# Patient Record
Sex: MALE | Race: WHITE | NOT HISPANIC OR LATINO | Employment: UNEMPLOYED | URBAN - METROPOLITAN AREA
[De-identification: names, ages, dates, MRNs, and addresses within clinical notes are randomized per-mention and may not be internally consistent; named-entity substitution may affect disease eponyms.]

---

## 2017-08-23 ENCOUNTER — HOSPITAL ENCOUNTER (EMERGENCY)
Facility: MEDICAL CENTER | Age: 42
End: 2017-08-23
Attending: EMERGENCY MEDICINE
Payer: COMMERCIAL

## 2017-08-23 ENCOUNTER — APPOINTMENT (OUTPATIENT)
Dept: RADIOLOGY | Facility: MEDICAL CENTER | Age: 42
End: 2017-08-23
Attending: EMERGENCY MEDICINE
Payer: COMMERCIAL

## 2017-08-23 VITALS
OXYGEN SATURATION: 97 % | HEIGHT: 71 IN | WEIGHT: 315 LBS | SYSTOLIC BLOOD PRESSURE: 125 MMHG | HEART RATE: 62 BPM | RESPIRATION RATE: 17 BRPM | DIASTOLIC BLOOD PRESSURE: 77 MMHG | BODY MASS INDEX: 44.1 KG/M2 | TEMPERATURE: 97.5 F

## 2017-08-23 DIAGNOSIS — R05.9 COUGH: ICD-10-CM

## 2017-08-23 DIAGNOSIS — D84.9 IMMUNOCOMPROMISED (HCC): ICD-10-CM

## 2017-08-23 DIAGNOSIS — R19.7 DIARRHEA OF PRESUMED INFECTIOUS ORIGIN: ICD-10-CM

## 2017-08-23 PROCEDURE — 71010 DX-CHEST-LIMITED (1 VIEW): CPT

## 2017-08-23 PROCEDURE — 99284 EMERGENCY DEPT VISIT MOD MDM: CPT

## 2017-08-23 PROCEDURE — 700101 HCHG RX REV CODE 250: Performed by: EMERGENCY MEDICINE

## 2017-08-23 PROCEDURE — 94640 AIRWAY INHALATION TREATMENT: CPT

## 2017-08-23 RX ORDER — ALBUTEROL SULFATE 90 UG/1
2 AEROSOL, METERED RESPIRATORY (INHALATION) EVERY 6 HOURS PRN
Qty: 1 INHALER | Refills: 0 | Status: SHIPPED | OUTPATIENT
Start: 2017-08-23 | End: 2017-11-13

## 2017-08-23 RX ORDER — ALBUTEROL SULFATE 2.5 MG/3ML
2.5 SOLUTION RESPIRATORY (INHALATION) EVERY 6 HOURS PRN
Qty: 75 ML | Refills: 1 | Status: SHIPPED | OUTPATIENT
Start: 2017-08-23 | End: 2017-11-13

## 2017-08-23 RX ADMIN — IPRATROPIUM BROMIDE 0.5 MG: 0.5 SOLUTION RESPIRATORY (INHALATION) at 09:11

## 2017-08-23 ASSESSMENT — PAIN SCALES - GENERAL: PAINLEVEL_OUTOF10: 0

## 2017-08-23 NOTE — ED PROVIDER NOTES
ED Provider Note    Scribed for Antonio Sagastume M.D. by Navin Mast. 8/23/2017  8:45 AM    Primary care provider: MARTÍN Cm  Means of arrival: Ambulance  History obtained from: Patient  History limited by: None    CHIEF COMPLAINT  Chief Complaint   Patient presents with   • Cold Symptoms     Pt bib ems. Pt reports cold-like symtoms, phelm in throat he cannot cough up, feeling sob with exertion, cough. pt reports recent exposure to sick family members. Pt arrives pink/warm/dry. Given 2 breathing tx per EMS with mild relief. Ambulatory to bathroom upon arrival with steady gait. No RT distress observed.   • Anxiety     Pt becomes very anxious/hyperventilating when he's unable to cough up phelm. Pt encouraged to slow his breathing down. Pt appears very anxious. Speaking in full sentences     HPI  Richard Hubbard is a 42 y.o. male who presents to the Emergency Department after being brought in by ambulance for cold-like symptoms including shortness of breath and diarrhea onset several days ago. The patient reports recent sick exposure to his family, who was experiencing diarrhea and other cold symptoms, and states he initially began experiencing diarrhea 3 days ago with 6 episodes daily. He describes it as watery that is occasionally large in quantity without any blood present. He states he took a several anti-diarrhea medications within the last 24 hours. The patient reports developing shortness of breath as well that exacerbates with exertion. He began developing cough and difficulty producing phlegm within the last 12 hours and has associated anxiety due to the shortness of breath. He received 2 breathing treatments by EMS with minimal relief. He denies any abdominal pain, nausea, vomiting, fever, headache,  rash, sore throat, or rhinorrhea. He states he has not taken any antibiotics within the last few weeks. The patient has past history of immunocompromiseDue to Humira and methotrexate use for  rheumatoid arthritis, but no history of diabetes. He denies history of asthma, does not smoke cigarettes, and has never used an inhaler.     REVIEW OF SYSTEMS  Pertinent positives include: shortness of breath, diarrhea, cold-like symptoms, anxiety, cough, and production of phlegm.  Pertinent negatives include: abdominal pain, nausea, vomiting, fever, headache, fever, rash, sore throat, rhinorrhea, or blood in diarrhea.  E.     PAST MEDICAL HISTORY  Past Medical History   Diagnosis Date   • Bleeding ulcer 5/21/2009   • Obesity, morbid (CMS-HCC) 5/21/2009   • Depression 5/21/2009   • Anxiety 5/21/2009   • Insomnia 5/21/2009   • Back pain 5/21/2009   • Sleep apnea 5/21/2009   • Allergic rhinitis 5/21/2009   • Migraine 5/21/2009   • Eczema 5/21/2009   • Rheumatoid arteritis    • ADD (attention deficit disorder)    • Alcohol abuse    • Bipolar disorder ([facility])        FAMILY HISTORY  Family History   Problem Relation Age of Onset   • Hypertension Mother    • Arthritis Mother    • Depression Mother    • Cancer Father      bladder   • Schizophrenia Father    • Cancer Maternal Grandmother      breast   • Heart Disease Maternal Grandmother    • Psychiatry Other    • Stroke Maternal Aunt        SOCIAL HISTORY  Social History   Substance Use Topics   • Smoking status: Former Smoker -- 0.25 packs/day for 3 years     Types: Cigarettes     Quit date: 01/01/2001   • Smokeless tobacco: Never Used      Comment: occasional cigar   • Alcohol Use: No      Comment: quit 2.5 yrs ago     History   Drug Use No       SURGICAL HISTORY  Past Surgical History   Procedure Laterality Date   • Gastric bypass laparoscopic         CURRENT MEDICATIONS  No current facility-administered medications for this encounter.     Current Outpatient Prescriptions   Medication Sig Dispense Refill   • lamotrigine (LAMICTAL) 150 MG tablet Take 1 Tab by mouth every day. 30 Tab 3   • Adalimumab (HUMIRA PEN SC) Inject  as instructed.     • Methotrexate Sodium  "(METHOTREXATE PO) Take  by mouth.     • oxycodone immediate release (ROXICODONE) 10 MG immediate release tablet Take 1 Tab by mouth 3 times a day as needed for Severe Pain. 90 Tab 0   • tramadol (ULTRAM) 50 MG Tab Take 1 Tab by mouth every 8 hours as needed. 90 Tab 1   • predniSONE (DELTASONE) 20 MG Tab 1 tab PO daily x 5 days 5 Tab 0   • predniSONE (DELTASONE) 10 MG TABS 1/2 tab PO daily 10 Tab 0   • esomeprazole magnesium (NEXIUM) 40 MG PACK Take 40 mg by mouth every morning before breakfast.     • Acetaminophen (TYLENOL PO) Take  by mouth.     • Pantoprazole Sodium (PROTONIX PO) Take  by mouth.         ALLERGIES  Allergies   Allergen Reactions   • Nsaids    • Penicillins    • Phenergan [Promethazine Hcl]        PHYSICAL EXAM  VITAL SIGNS: /54 mmHg  Pulse 71  Temp(Src) 36.4 °C (97.5 °F)  Resp 18  Ht 1.803 m (5' 10.98\")  Wt 147.419 kg (325 lb)  BMI 45.35 kg/m2  Reviewed andNormal  Constitutional: Well developed, Well nourished.  HENT: Normocephalic, atraumatic, bilateral external ears normal, oropharynx slightly dry, No exudates or erythema.   Eyes: PERRLA 3mm, conjunctiva pink, no scleral icterus.   Cardiovascular: Regular rate and rhythm. No murmurs, rubs or gallops.   Respiratory: Lungs clear to auscultation bilaterally. Slightly tachypneic. No wheezes, rales, or rhonchi.   Gastrointestinal:  Abdomen soft, non-tender, non distended.   Skin: Varicose veins.  Genitourinary: No costovertebral angle tenderness.   Neurologic: Alert & oriented x 3, cranial nerves 2-12 intact by passive exam.  No focal deficit noted.  Psychiatric: Affect normal, Judgment normal, Mood normal.     DIFFERENTIAL DIAGNOSIS:  PNA, viral syndrome, bronchospasms, Cdiff diarrhea, bacterial gastroenteritis, immunocompromise.    RADIOLOGY/PROCEDURES  DX-CHEST-LIMITED (1 VIEW)   Final Result      No evidence of acute cardiopulmonary process.        LABORATORY:  Unable to obtain stool sample.     INTERVENTIONS:  Medications "   ipratropium (ATROVENT) 0.02 % nebulizer solution 0.5 mg (0.5 mg Nebulization Given 8/23/17 0911)   Response: temporarily improved, but unable to hear improvement upon reassessment of lungs.    COURSE & MEDICAL DECISION MAKING    8:45 AM - Patient seen and examined at bedside. Patient will be treated with Atrovent nebulizer for his symptoms. Ordered DX-chest, Cdiff, and culture stool to evaluate.     8:51 AM Nursing staff informed me the patient admitted to taking 112 tablets of anti-diarrhea within the last 24 hours.     12:52 PM Reviewed the patient's imaging results, which are shown above.     1:06 PM Patient was reevaluated at bedside. Discussed radiology results with the patient and informed them of the results shown above. Patient was unable to provide stool sample here in the ED, so I recommended that he freeze one upon having a bowel movement at home and submit it for sampling in lab. The patient will be discharged with Albuterol and Atrovent and should return if symptoms worsen or if new symptoms arise. The patient understands and agrees to plan.      This patient with immunocompromise due to Humira and methotrexate and prednisone for rheumatoid arthritis presents with cough and diarrhea. He has an ill contact with similar symptoms so this is likely a viral syndrome. There is no pneumonia. Patient could not provide a sample for stool studies so these will be obtained as an outpatient for C. diff and stool culture given his immunocompromise. There is no evidence of sepsis.    PLAN:  New Prescriptions    ALBUTEROL (PROVENTIL) 2.5MG/3ML NEBU SOLN SOLUTION FOR NEBULIZATION    3 mL by Nebulization route every 6 hours as needed (shortness of breath, cough or wheeze).    IPRATROPIUM (ATROVENT HFA) 17 MCG/ACT AERO SOLN    Inhale 2 Puffs by mouth every 6 hours as needed (cough, shortness of breath, wheeze, sputum production).     Return for shortness of breath, dizziness, abdominal pain, fever, uncontrolled  vomiting, ill appearance  Outpatient stool sample  [facility]    CONDITION: Good.    FINAL IMPRESSION  1. Cough    2. Diarrhea of presumed infectious origin    3. Immunocompromised (CMS-HCC)       INavin (Scribe), am scribing for, and in the presence of, Antonio Sagastume M.D..    Electronically signed by: Navin Mast (Marcelinaibaba), 8/23/2017    IAntonio M.D. personally performed the services described in this documentation, as scribed by Navin Mast in my presence, and it is both accurate and complete.     The note accurately reflects work and decisions made by me.  Antonio Sagastume  8/23/2017  2:06 PM

## 2017-08-23 NOTE — ED NOTES
Chief Complaint   Patient presents with   • Cold Symptoms     Pt bib ems. Pt reports cold-like symtoms, phelm in throat he cannot cough up, feeling sob with exertion, cough. pt reports recent exposure to sick family members. Pt arrives pink/warm/dry. Given 2 breathing tx per EMS with mild relief. Ambulatory to bathroom upon arrival with steady gait. No RT distress observed.   • Anxiety     Pt becomes very anxious/hyperventilating when he's unable to cough up phelm. Pt encouraged to slow his breathing down. Pt appears very anxious. Speaking in full sentences

## 2017-08-23 NOTE — ED NOTES
Pt given spacer for inhalers, given instruct on usage. Pt given prescription instruct as well as discharge instruct to follow up with doctor. Pt given supplies to collect stool sample and how to follow up with pcp lab. Pt alert/orineted x4, steady gait. Ambulatory to bathroom prior to discharge. Denies questions needs at this time. No RT distress noted.

## 2017-08-23 NOTE — ED AVS SNAPSHOT
[email or web address] Access Code: Activation code not generated  Current [email or web address] Status: Active    [employer]  A secure, online tool to manage your health information     [employer]’s [email or web address]® is a secure, online tool that connects you to your personalized health information from the privacy of your home -- day or night - making it very easy for you to manage your healthcare. Once the activation process is completed, you can even access your medical information using the [email or web address] cj, which is available for free in the Apple Cj store or Google Play store.     [email or web address] provides the following levels of access (as shown below):   My Chart Features   [facility] Primary Care Doctor [facility]  Specialists [facility]  Urgent  Care Non-[facility]  Primary Care  Doctor   Email your healthcare team securely and privately 24/7 X X X X   Manage appointments: schedule your next appointment; view details of past/upcoming appointments X      Request prescription refills. X      View recent personal medical records, including lab and immunizations X X X X   View health record, including health history, allergies, medications X X X X   Read reports about your outpatient visits, procedures, consult and ER notes X X X X   See your discharge summary, which is a recap of your hospital and/or ER visit that includes your diagnosis, lab results, and care plan. X X       How to register for [email or web address]:  1. Go to  [email or web address].  2. Click on the Sign Up Now box, which takes you to the New Member Sign Up page. You will need to provide the following information:  a. Enter your [email or web address] Access Code exactly as it appears at the top of this page. (You will not need to use this code after you’ve completed the sign-up process. If you do not sign up before the expiration date, you must request a new code.)   b. Enter your date of birth.   c. Enter your home email address.   d. Click Submit, and follow the next screen’s instructions.  3. Create a [email or web address] ID. This will  be your Phone2Action login ID and cannot be changed, so think of one that is secure and easy to remember.  4. Create a [employer]2Action password. You can change your password at any time.  5. Enter your Password Reset Question and Answer. This can be used at a later time if you forget your password.   6. Enter your e-mail address. This allows you to receive e-mail notifications when new information is available in Phone2Action.  7. Click Sign Up. You can now view your health information.    For assistance activating your Phone2Action account, call [phone]

## 2017-08-23 NOTE — FLOWSHEET NOTE
08/23/17 0911   Interdisciplinary Plan of Care-Goals (Indications)   Obstructive Ventilatory Defect or Pulmonary Disease without Obvious Obstruction Strong Subjective / Objective Improvement   Interdisciplinary Plan of Care-Outcomes    Bronchodilator Outcome Patient at Stable Baseline   Education   Education Yes - Pt. / Family has been Instructed in use of Respiratory Equipment;Yes - Pt. / Family has been Instructed in use of Respiratory Medications and Adverse Reactions   RT Assessment of Delivered Medications   Evaluation of Medication Delivery Daily Yes-- Pt /Family has been Instructed in use of Respiratory Medications and Adverse Reactions   SVN Group   #SVN Performed Yes   Given By: Mouthpiece   Date SVN Last Changed 08/23/17   Date SVN Next Change Due (Q 7 Days) 08/30/17   Respiratory WDL   Respiratory (WDL) X   Chest Exam   Work Of Breathing / Effort Mild   Respiration 20   Pulse 74   Heart Rate (Monitored) 74   Breath Sounds   Pre/Post Intervention Pre Intervention Assessment   RML Breath Sounds Diminished   RLL Breath Sounds Diminished   LLL Breath Sounds Diminished   Secretions   Cough Non Productive   Oximetry   Continuous Oximetry Yes   O2 Alarms Set & Reviewed Yes   Oxygen   Pulse Oximetry 95 %   O2 (LPM) 0   O2 (FiO2) 21   O2 Daily Delivery Respiratory  Room Air with O2 Available

## 2017-08-23 NOTE — DISCHARGE INSTRUCTIONS
Take albuterol and Atrovent for cough and congestion. Minimize antidiarrheal use. Submit a stool sample to the lab. Follow-up at [facility] urgent care for stool study results in 2 days. Return for bloody diarrhea, abdominal pain, high fever, shortness of breath, dizziness or ill appearance.

## 2017-08-23 NOTE — ED NOTES
Chief Complaint   Patient presents with   • Cold Symptoms     Pt bib ems. Pt reports cold-like symtoms, phelm in throat he cannot cough up, feeling sob with exertion, cough. pt reports recent exposure to sick family members. Pt arrives pink/warm/dry. Given 2 breathing tx per EMS with mild relief. Ambulatory to bathroom upon arrival with steady gait. No RT distress observed.

## 2017-08-23 NOTE — ED AVS SNAPSHOT
8/23/2017    Richard Hubbard  [address] [place] [address]    Dear :    [facility] wants to ensure your discharge home is safe and you or your loved ones have had all of your questions answered regarding your care after you leave the hospital.    Below is a list of resources and contact information should you have any questions regarding your hospital stay, follow-up instructions, or active medical symptoms.    Questions or Concerns Regarding… Contact   Medical Questions Related to Your Discharge  (7 days a week, 8am-5pm) Contact a Nurse Care Coordinator   [phone]   Medical Questions Not Related to Your Discharge  (24 hours a day / 7 days a week)  Contact the Nurse Health Line   [phone]    Medications or Discharge Instructions Refer to your discharge packet   or contact your [facility] Primary Care Provider   [phone]   Follow-up Appointment(s) Schedule your appointment via [employer]   or contact Scheduling [phone]   Billing Review your statement via [employer]  or contact Billing [phone]   Medical Records Review your records via [employer]   or contact Medical Records [phone]     You may receive a telephone call within two days of discharge. This call is to make certain you understand your discharge instructions and have the opportunity to have any questions answered. You can also easily access your medical information, test results and upcoming appointments via the [employer] free online health management tool. You can learn more and sign up at [email or web address]. For assistance setting up your [employer] account, please call [phone].    Once again, we want to ensure your discharge home is safe and that you have a clear understanding of any next steps in your care. If you have any questions or concerns, please do not hesitate to contact us, we are here for you. Thank you for choosing [facility] for your healthcare needs.    Sincerely,    Your [facility] Healthcare Team

## 2017-08-23 NOTE — ED AVS SNAPSHOT
Home Care Instructions                                                                                                                Richard Hubbard   MRN: [id number]    Department:  [facility], Emergency Dept   Date of Visit:  8/23/2017            [facility], Emergency Dept    [address]    [place] [address]    Phone:  [phone]      You were seen by     Antonio Sagastume M.D.      Your Diagnosis Was     Cough     R05       These are the medications you received during your hospitalization from 08/23/2017 0748 to 08/23/2017 1325     Date/Time Order Dose Route Action    08/23/2017 0911 ipratropium (ATROVENT) 0.02 % nebulizer solution 0.5 mg 0.5 mg Nebulization Given      Medication Information     Review all of your home medications and newly ordered medications with your primary doctor and/or pharmacist as soon as possible. Follow medication instructions as directed by your doctor and/or pharmacist.     Please keep your complete medication list with you and share with your physician. Update the information when medications are discontinued, doses are changed, or new medications (including over-the-counter products) are added; and carry medication information at all times in the event of emergency situations.               Medication List      START taking these medications        Instructions    Morning Afternoon Evening Bedtime    * albuterol 2.5mg/3ml Nebu solution for nebulization   Commonly known as:  PROVENTIL        3 mL by Nebulization route every 6 hours as needed (shortness of breath, cough or wheeze).   Dose:  2.5 mg                        * albuterol 108 (90 Base) MCG/ACT Aers inhalation aerosol        Inhale 2 Puffs by mouth every 6 hours as needed for Shortness of Breath. You may use it up to every 4 hours but should see a physician if you need it more often than every 4 hours.   Dose:  2 Puff                        * ipratropium 17 MCG/ACT Aers   Commonly  known as:  ATROVENT HFA        Inhale 2 Puffs by mouth every 6 hours as needed (cough, shortness of breath, wheeze, sputum production).   Dose:  2 Puff                        * ipratropium 17 MCG/ACT Aers   Commonly known as:  ATROVENT HFA        Inhale 2 Puffs by mouth every 6 hours as needed (cough, shortness of breath, wheeze, sputum production).   Dose:  2 Puff                        * Notice:  This list has 4 medication(s) that are the same as other medications prescribed for you. Read the directions carefully, and ask your doctor or other care provider to review them with you.      ASK your doctor about these medications        Instructions    Morning Afternoon Evening Bedtime    HUMIRA PEN SC        Inject  as instructed.                        lamotrigine 150 MG tablet   Commonly known as:  LAMICTAL        Take 1 Tab by mouth every day.   Dose:  150 mg                        METHOTREXATE PO        Take  by mouth.                        NEXIUM 40 MG Pack   Generic drug:  esomeprazole magnesium        Take 40 mg by mouth every morning before breakfast.   Dose:  40 mg                        oxycodone immediate release 10 MG immediate release tablet   Commonly known as:  ROXICODONE        Doctor's comments:  Fill after 11/12/15   Take 1 Tab by mouth 3 times a day as needed for Severe Pain.   Dose:  10 mg                        * predniSONE 10 MG Tabs   Commonly known as:  DELTASONE        1/2 tab PO daily                        * predniSONE 20 MG Tabs   Commonly known as:  DELTASONE        1 tab PO daily x 5 days                        PROTONIX PO        Take  by mouth.                        tramadol 50 MG Tabs   Commonly known as:  ULTRAM        Take 1 Tab by mouth every 8 hours as needed.   Dose:  50 mg                        TYLENOL PO        Take  by mouth.                        * Notice:  This list has 2 medication(s) that are the same as other medications prescribed for you. Read the directions  carefully, and ask your doctor or other care provider to review them with you.         Where to Get Your Medications      These medications were sent to [id number] - [place], [place] - [address] [facility] [address]  [address], [place] [address]     Phone:  [phone]    - albuterol 2.5mg/3ml Nebu solution for nebulization  - ipratropium 17 MCG/ACT Aers      You can get these medications from any pharmacy     Bring a paper prescription for each of these medications    - albuterol 108 (90 Base) MCG/ACT Aers inhalation aerosol  - ipratropium 17 MCG/ACT Aers            Procedures and tests performed during your visit     DX-CHEST-LIMITED (1 VIEW)    SMALL VOLUME NEBULIZER        Discharge Instructions       Take albuterol and Atrovent for cough and congestion. Minimize antidiarrheal use. Submit a stool sample to the lab. Follow-up at [facility] urgent care for stool study results in 2 days. Return for bloody diarrhea, abdominal pain, high fever, shortness of breath, dizziness or ill appearance.          Patient Information     Patient Information    Following emergency treatment: all patient requiring follow-up care must return either to a private physician or a clinic if your condition worsens before you are able to obtain further medical attention, please return to the emergency room.     Billing Information    At [facility], we work to make the billing process streamlined for our patients.  Our Representatives are here to answer any questions you may have regarding your hospital bill.  If you have insurance coverage and have supplied your insurance information to us, we will submit a claim to your insurer on your behalf.  Should you have any questions regarding your bill, we can be reached online or by phone as follows:  Online: You are able pay your bills online or live chat with our representatives about any billing questions you may have. We are here to help Monday  - Friday from 8:00am to 7:30pm and 9:00am - 12:00pm on Saturdays.  Please visit [email or web address]  for more information.   Phone:  [phone] or [phone]    Please note that your emergency physician, surgeon, pathologist, radiologist, anesthesiologist, and other specialists are not employed by [facility] and will therefore bill separately for their services.  Please contact them directly for any questions concerning their bills at the numbers below:     Emergency Physician Services:  [phone]  [facility]:  [phone]  [facility]:  [phone]  [facility]:  [phone]    1. Your final bill may vary from the amount quoted upon discharge if all procedures are not complete at that time, or if your doctor has additional procedures of which we are not aware. You will receive an additional bill if you return to the Emergency Department at [facility] for suture removal regardless of the facility of which the sutures were placed.     2. Please arrange for settlement of this account at the emergency registration.    3. All self-pay accounts are due in full at the time of treatment.  If you are unable to meet this obligation then payment is expected within 4-5 days.     4. If you have had radiology studies (CT, X-ray, Ultrasound, MRI), you have received a preliminary result during your emergency department visit. Please contact the radiology department [phone] to receive a copy of your final result. Please discuss the Final result with your primary physician or with the follow up physician provided.     Crisis Hotline:  [place] Crisis Hotline:  5-167-LIAAMEP or 1-613.909.1681  [place] Crisis Hotline:    1-690.995.8656 or 518-146-4909         ED Discharge Follow Up Questions    1. In order to provide you with very good care, we would like to follow up with a phone call in the next few days.  May we have your permission  to contact you?     YES /  NO    2. What is the best phone number to call you? (       )_____-__________    3. What is the best time to call you?      Morning  /  Afternoon  /  Evening                   Patient Signature:  ____________________________________________________________    Date:  ____________________________________________________________

## 2017-08-23 NOTE — ED NOTES
PT informed this RN that he took approx 112 anti-diarrhea pills in the last 24 hours. Pt reports dry mouth and that his diarrhea has resolved.

## 2017-11-13 ENCOUNTER — OFFICE VISIT (OUTPATIENT)
Dept: MEDICAL GROUP | Facility: MEDICAL CENTER | Age: 42
End: 2017-11-13
Payer: COMMERCIAL

## 2017-11-13 VITALS
SYSTOLIC BLOOD PRESSURE: 140 MMHG | BODY MASS INDEX: 44.1 KG/M2 | HEIGHT: 71 IN | HEART RATE: 68 BPM | DIASTOLIC BLOOD PRESSURE: 90 MMHG | OXYGEN SATURATION: 97 % | WEIGHT: 315 LBS | TEMPERATURE: 97.9 F

## 2017-11-13 DIAGNOSIS — R35.89 POLYURIA: ICD-10-CM

## 2017-11-13 DIAGNOSIS — E66.01 MORBID OBESITY WITH BMI OF 40.0-44.9, ADULT (HCC): ICD-10-CM

## 2017-11-13 DIAGNOSIS — Z23 NEED FOR VACCINATION: ICD-10-CM

## 2017-11-13 DIAGNOSIS — R60.0 BILATERAL LEG EDEMA: ICD-10-CM

## 2017-11-13 DIAGNOSIS — M06.9 RHEUMATOID ARTHRITIS INVOLVING MULTIPLE SITES, UNSPECIFIED RHEUMATOID FACTOR PRESENCE: ICD-10-CM

## 2017-11-13 DIAGNOSIS — F50.89 PICA IN ADULTS: ICD-10-CM

## 2017-11-13 DIAGNOSIS — G47.33 OSA (OBSTRUCTIVE SLEEP APNEA): ICD-10-CM

## 2017-11-13 DIAGNOSIS — F32.A DEPRESSION, UNSPECIFIED DEPRESSION TYPE: ICD-10-CM

## 2017-11-13 PROCEDURE — 90686 IIV4 VACC NO PRSV 0.5 ML IM: CPT | Performed by: NURSE PRACTITIONER

## 2017-11-13 PROCEDURE — 90471 IMMUNIZATION ADMIN: CPT | Performed by: NURSE PRACTITIONER

## 2017-11-13 PROCEDURE — 99214 OFFICE O/P EST MOD 30 MIN: CPT | Mod: 25 | Performed by: NURSE PRACTITIONER

## 2017-11-13 ASSESSMENT — PAIN SCALES - GENERAL: PAINLEVEL: 4=SLIGHT-MODERATE PAIN

## 2017-11-13 ASSESSMENT — PATIENT HEALTH QUESTIONNAIRE - PHQ9: CLINICAL INTERPRETATION OF PHQ2 SCORE: 0

## 2017-11-14 NOTE — PROGRESS NOTES
CC: swelling in legs      Richard Hubbard is a 42 y.o. male here to establish care and to discuss the evaluation and management of:    1. Rheumatoid arthritis involving multiple sites, unspecified rheumatoid factor presence (CMS-HCC)  Patient suffers from rheumatoid arthritis in his knees, ankles and his hands. He sees a rheumatologist and takes Humira and occasional prednisone. Needs a another referral to rheumatology    2. MONICA (obstructive sleep apnea)  Patient states that he was seen by pulmonology and he was told that he has sleep apnea and uses CPAP machine however states that is not correctly calibrated and he needs a referral back to pulmonology.    3. Depression, unspecified depression type  Patient does suffer from depression does take his Lamictal daily. Denies any suicidal thoughts or ideations.    4. Polyuria  Patient states that he has noticed an increase in his urination, and thinks it may be related to the swelling in his legs. States his wife is concerned for diabetes however denies any polyphagia and/or polydipsia.    5. Bilateral leg edema  Patient states for approximately one month he has had some swelling in his legs, pain around his ankles, no pain in his calves. No cuts or wounds, no shortness of breath, chest pain. Does not wear compression stockings states is on his feet as he takes care of his 2-year-old daughter. Denies any numbness or tingling in his feet, states that after resting throughout the night his flank and a little bit decreased but not significantly.    6. Pica in adults  States that he could have approximate 32 ounces of ice per day. States his wife is concerned for anemia as he has had it before. Denies any acute blood loss.    7. Morbid obesity with BMI of 40.0-44.9, adult (CMS-HCC)  He states that he did have a history in 1999 of a Lucila-en-Y procedure. Does not routinely exercise.    8. Need for vaccination  Requesting flu vaccine      ROS:  Denies any Headache, Blurred  Vision, Confusion Chest pain,  Shortness of breath,  Abdominal pain, Changes of bowel or bladder, Fevers, Nights sweats, Weight Changes, Focal weakness or numbness.  All other systems are negative.      Current Outpatient Prescriptions:   •  lamotrigine (LAMICTAL) 150 MG tablet, Take 1 Tab by mouth every day., Disp: 30 Tab, Rfl: 3  •  Adalimumab (HUMIRA PEN SC), Inject  as instructed., Disp: , Rfl:   •  Methotrexate Sodium (METHOTREXATE PO), Take  by mouth., Disp: , Rfl:   •  esomeprazole magnesium (NEXIUM) 40 MG PACK, Take 40 mg by mouth every morning before breakfast., Disp: , Rfl:   •  Acetaminophen (TYLENOL PO), Take  by mouth., Disp: , Rfl:   •  predniSONE (DELTASONE) 20 MG Tab, 1 tab PO daily x 5 days, Disp: 5 Tab, Rfl: 0    Allergies   Allergen Reactions   • Nsaids    • Penicillins    • Phenergan [Promethazine Hcl]        Past Medical History:   Diagnosis Date   • ADD (attention deficit disorder)    • Alcohol abuse    • Allergic rhinitis 5/21/2009   • Anxiety 5/21/2009   • Back pain 5/21/2009   • Bipolar disorder (CMS-HCC)    • Bleeding ulcer 5/21/2009   • Depression 5/21/2009   • Eczema 5/21/2009   • History of bleeding ulcers 2/12/2015   • Insomnia 5/21/2009   • Migraine 5/21/2009   • Obesity, morbid (CMS-HCC) 5/21/2009   • Rheumatoid arteritis    • Sleep apnea 5/21/2009     Past Surgical History:   Procedure Laterality Date   • GASTRIC BYPASS LAPAROSCOPIC       Family History   Problem Relation Age of Onset   • Hypertension Mother    • Arthritis Mother    • Depression Mother    • Cancer Father      bladder   • Schizophrenia Father    • Cancer Maternal Grandmother      breast   • Heart Disease Maternal Grandmother    • Psychiatry Other    • Stroke Maternal Aunt      Social History     Social History   • Marital status:      Spouse name: N/A   • Number of children: N/A   • Years of education: N/A     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      Social History Main Topics   • Smoking status: Former  "Smoker     Packs/day: 0.25     Years: 3.00     Types: Cigarettes     Quit date: 1/1/2001   • Smokeless tobacco: Never Used      Comment: occasional cigar   • Alcohol use No      Comment: quit 2.5 yrs ago   • Drug use: No   • Sexual activity: Yes     Partners: Female      Comment: ;      Other Topics Concern   • Not on file     Social History Narrative    , lives in [place]       Objective:     Vitals: /90   Pulse 68   Temp 36.6 °C (97.9 °F)   Ht 1.803 m (5' 11\")   Wt (!) 145.9 kg (321 lb 10.4 oz)   SpO2 97%   BMI 44.86 kg/m²      General: Alert, pleasant, NAD  HEENT:  Normocephalic. No icterus or pallor.  Conjunctivae and lids normal.  External ears normal.   Neck supple.  No thyromegaly or masses palpated. No cervical or supraclavicular lymphadenopathy.  Heart:  Regular rate and rhythm.  S1 and S2 normal.  +murmur 2/6  Respiratory:  Normal respiratory effort.  Clear to auscultation bilaterally.    Skin:  Warm, dry, no rashes  Musculoskeletal:  Gait is normal.  Moves all extremities well.  Extremities:  Pedal pulses 1+ symmetric. Bilateral 3+ leg edema with approximately 2+ pitting edema, no ulcers or discoloration in LE.  Neurological: No tremors, sensation grossly intact,gait is normal  Psych:  Affect/mood is normal, judgement is good, memory is intact, grooming is appropriate.      Assessment and Plan.   42 y.o. male here to establish and discuss the following    1. Rheumatoid arthritis involving multiple sites, unspecified rheumatoid factor presence (CMS-HCC)  Chronic. Takes Humira and prednisone as needed for flareups. Managed by rheumatology.  - REFERRAL TO RHEUMATOLOGY    2. MONICA (obstructive sleep apnea  Chronic. CPAP machine not calibrated. Last pulmonary sleep Center visit was in 2016.  - REFERRAL TO PULMONOLOGY    3. Depression, unspecified depression type  Chronic. Continue to take Lamictal. Denies any suicidal ideations at this time.  - REFERRAL TO BEHAVIORAL HEALTH  - TSH WITH " REFLEX TO FT4; Future    4. Polyuria  New.Possibly related to underlying diagnosis of diabetes, or increased fluid retention. We will see if fasting glucose is elevated and move forward ruling out diabetes.  - COMP METABOLIC PANEL; Future    5. Bilateral leg edema  Stable.Discussed elevating legs, wearing compression stockings, decreasing salt intake. If not improved will recommend cardiac workup.    6. Pica in adults  Not controlled. Patient states that he has roughly has 32 ounces of ice daily. Has a history of anemia will check a CBC.  - CBC WITHOUT DIFFERENTIAL; Future    7. Morbid obesity with BMI of 40.0-44.9, adult (CMS-HCC)  Chronic. Encourage heart healthy diet and exercise.    8. Need for vaccination    - INFLUENZA VACCINE QUAD INJ >3Y(PF)      Return in about 2 weeks (around 11/27/2017).    I have placed the above orders and discussed them with an approved delegating provider.  The MA is performing the below orders under the direction of Dr. Morales RESENDIZ

## 2017-12-29 ENCOUNTER — TELEPHONE (OUTPATIENT)
Dept: MEDICAL GROUP | Facility: MEDICAL CENTER | Age: 42
End: 2017-12-29

## 2017-12-29 NOTE — TELEPHONE ENCOUNTER
1. Caller Name: Dr. Man Farah                                         Call Back Number: [phone]      Patient approves a detailed voicemail message: no    Received request for stat authorization for pt to be seen by Dr. Conway, rheumatology, on Tuesday 01/02

## 2018-02-02 ENCOUNTER — OFFICE VISIT (OUTPATIENT)
Dept: URGENT CARE | Facility: PHYSICIAN GROUP | Age: 43
End: 2018-02-02
Payer: COMMERCIAL

## 2018-02-02 VITALS
RESPIRATION RATE: 24 BRPM | WEIGHT: 299 LBS | HEART RATE: 114 BPM | HEIGHT: 71 IN | OXYGEN SATURATION: 93 % | DIASTOLIC BLOOD PRESSURE: 90 MMHG | BODY MASS INDEX: 41.86 KG/M2 | TEMPERATURE: 100.1 F | SYSTOLIC BLOOD PRESSURE: 140 MMHG

## 2018-02-02 DIAGNOSIS — J01.90 ACUTE BACTERIAL SINUSITIS: ICD-10-CM

## 2018-02-02 DIAGNOSIS — H66.91 ACUTE RIGHT OTITIS MEDIA: ICD-10-CM

## 2018-02-02 DIAGNOSIS — R50.9 FEVER, UNSPECIFIED FEVER CAUSE: ICD-10-CM

## 2018-02-02 DIAGNOSIS — B96.89 ACUTE BACTERIAL SINUSITIS: ICD-10-CM

## 2018-02-02 DIAGNOSIS — R06.02 SHORTNESS OF BREATH: ICD-10-CM

## 2018-02-02 DIAGNOSIS — H10.33 ACUTE BACTERIAL CONJUNCTIVITIS OF BOTH EYES: ICD-10-CM

## 2018-02-02 LAB
FLUAV+FLUBV AG SPEC QL IA: NORMAL
INT CON NEG: NORMAL
INT CON POS: NORMAL

## 2018-02-02 PROCEDURE — 87804 INFLUENZA ASSAY W/OPTIC: CPT | Performed by: FAMILY MEDICINE

## 2018-02-02 PROCEDURE — 99214 OFFICE O/P EST MOD 30 MIN: CPT | Mod: 25 | Performed by: FAMILY MEDICINE

## 2018-02-02 RX ORDER — BENZONATATE 200 MG/1
CAPSULE ORAL
Qty: 30 CAP | Refills: 0 | Status: SHIPPED | OUTPATIENT
Start: 2018-02-02 | End: 2018-08-09

## 2018-02-02 RX ORDER — LEVOFLOXACIN 500 MG/1
TABLET, FILM COATED ORAL
Qty: 10 TAB | Refills: 0 | Status: SHIPPED | OUTPATIENT
Start: 2018-02-02 | End: 2018-08-09

## 2018-02-02 RX ORDER — CEFTRIAXONE 1 G/1
1 INJECTION, POWDER, FOR SOLUTION INTRAMUSCULAR; INTRAVENOUS ONCE
Status: COMPLETED | OUTPATIENT
Start: 2018-02-02 | End: 2018-02-02

## 2018-02-02 RX ORDER — ALBUTEROL SULFATE 90 UG/1
AEROSOL, METERED RESPIRATORY (INHALATION)
Qty: 1 INHALER | Refills: 3 | Status: SHIPPED | OUTPATIENT
Start: 2018-02-02 | End: 2019-08-15

## 2018-02-02 RX ORDER — OFLOXACIN 3 MG/ML
SOLUTION/ DROPS OPHTHALMIC
Qty: 5 ML | Refills: 1 | Status: SHIPPED | OUTPATIENT
Start: 2018-02-02 | End: 2019-08-15

## 2018-02-02 RX ADMIN — CEFTRIAXONE 1 G: 1 INJECTION, POWDER, FOR SOLUTION INTRAMUSCULAR; INTRAVENOUS at 13:46

## 2018-02-02 NOTE — PROGRESS NOTES
Chief Complaint:    Chief Complaint   Patient presents with   • Cough     productive cough, runny nose, SOB, fever       History of Present Illness:    This is a new problem. He has been sick x 4 days. Has had fever over 100 F, purulent discharge from both eyes, right ear discomfort, nasal symptoms with purulent mucus from nose, sore throat, cough productive of purulent mucus, and occl shortness of breath. Needs refill of Albuterol MDI. Had non-life threatening reaction to PCN as child but as had PCN since without problems.      Review of Systems:    Constitutional: See HPI.  Eyes: See HPI.  ENT: See HPI.   Respiratory: See HPI.  Cardiovascular: Negative for chest pain, palpitations, orthopnea, claudication, leg swelling, and PND.   Gastrointestinal: Negative for abdominal pain, nausea, vomiting, diarrhea, constipation, blood in stool, and melena.   Genitourinary: Negative for dysuria, urinary urgency, urinary frequency, hematuria, and flank pain.   Musculoskeletal: No new symptoms.  Skin: Negative for rash and itching.   Neurological: Negative for dizziness, tingling, tremors, sensory change, speech change, focal weakness, seizures, loss of consciousness, and headaches.   Endo: Negative for polydipsia.   Heme: Does not bruise/bleed easily.   Psychiatric/Behavioral: No new symptoms.    Past Medical History:    Past Medical History:   Diagnosis Date   • ADD (attention deficit disorder)    • Alcohol abuse    • Allergic rhinitis 5/21/2009   • Anxiety 5/21/2009   • Back pain 5/21/2009   • Bipolar disorder (CMS-HCC)    • Bleeding ulcer 5/21/2009   • Depression 5/21/2009   • Eczema 5/21/2009   • History of bleeding ulcers 2/12/2015   • Insomnia 5/21/2009   • Migraine 5/21/2009   • Obesity, morbid (CMS-HCC) 5/21/2009   • Rheumatoid arteritis    • Sleep apnea 5/21/2009       Past Surgical History:    Past Surgical History:   Procedure Laterality Date   • GASTRIC BYPASS LAPAROSCOPIC         Social History:    Social History  "    Social History   • Marital status:      Spouse name: N/A   • Number of children: N/A   • Years of education: N/A     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      Social History Main Topics   • Smoking status: Former Smoker     Packs/day: 0.25     Years: 3.00     Types: Cigarettes     Quit date: 1/1/2001   • Smokeless tobacco: Never Used      Comment: occasional cigar   • Alcohol use No      Comment: quit 2.5 yrs ago   • Drug use: No   • Sexual activity: Yes     Partners: Female      Comment: ;      Other Topics Concern   • Not on file     Social History Narrative    , lives in [place]       Family History:    Family History   Problem Relation Age of Onset   • Hypertension Mother    • Arthritis Mother    • Depression Mother    • Cancer Father      bladder   • Schizophrenia Father    • Cancer Maternal Grandmother      breast   • Heart Disease Maternal Grandmother    • Psychiatry Other    • Stroke Maternal Aunt        Medications:    Current Outpatient Prescriptions on File Prior to Visit   Medication Sig Dispense Refill   • lamotrigine (LAMICTAL) 150 MG tablet Take 1 Tab by mouth every day. 30 Tab 3   • Adalimumab (HUMIRA PEN SC) Inject  as instructed.     • Methotrexate Sodium (METHOTREXATE PO) Take  by mouth.     • esomeprazole magnesium (NEXIUM) 40 MG PACK Take 40 mg by mouth every morning before breakfast.     • Acetaminophen (TYLENOL PO) Take  by mouth.       No current facility-administered medications on file prior to visit.        Allergies:    Allergies   Allergen Reactions   • Nsaids    • Penicillins    • Phenergan [Promethazine Hcl]        Vitals:    Vitals:    02/02/18 1204   BP: 140/90   Pulse: (!) 114   Resp: (!) 24   Temp: 37.8 °C (100.1 °F)   SpO2: 93%   Weight: (!) 135.6 kg (299 lb)   Height: 1.803 m (5' 11\")       Physical Exam:    Constitutional: Vital signs reviewed. Appears well-developed and well-nourished. Occl cough. Fatigued.  Eyes: Purulent discharge at bilateral " medial canthi. Conjunctivae mildly injected bilaterally. PERRLA.  ENT: Right TM is markedly erythematous. External ears normal. External auditory canals normal without discharge. Left TM translucent and non-bulging. Hearing normal. Nasal mucosa erythematous. Lips/teeth are normal. Oral mucosa pink and moist. Posterior pharynx: mild-moderately erythematous. No exudate.  Neck: Neck supple.   Cardiovascular: Regular rate and rhythm with 2/6 SE murmur. He is aware of murmur, has been evaluated, and has been told is benign.  Pulmonary/Chest: Respirations non-labored. Clear to auscultation bilaterally.  Lymph: Cervical nodes without tenderness or enlargement.  Musculoskeletal: Normal gait. Normal range of motion. No muscular atrophy or weakness.  Neurological: Alert and oriented to person, place, and time. Muscle tone normal. Coordination normal.   Skin: No rashes or lesions. Warm, dry, normal turgor.  Psychiatric: Normal mood and affect. Behavior is normal. Judgment and thought content normal.     Diagnostics:    POCT INFLUENZA A/B (Order #[id number]) on 2/2/18   Component Results     Component   Rapid Influenza A-B   NEG    Internal Control Positive   Valid    Internal Control Negative   Valid    Last Resulted Time   Fri Feb 2, 2018  1:45 PM       Assessment / Plan:    1. Fever, unspecified fever cause  - POCT Influenza A/B    2. Acute right otitis media  - cefTRIAXone (ROCEPHIN) injection 1 g; 1,000 mg by Intramuscular route Once.  - levoFLOXacin (LEVAQUIN) 500 MG tablet; 1 TAB ONCE A DAY X 10 DAYS.  Dispense: 10 Tab; Refill: 0    3. Acute bacterial conjunctivitis of both eyes  - ofloxacin (OCUFLOX) 0.3 % Solution; 1-2 DROPS TO BOTH EYES EVERY 2-4 HOURS WHILE AWAKE. USE FOR ONE EXTRA DAY AFTER BETTER.  Dispense: 5 mL; Refill: 1    4. Acute bacterial sinusitis  - benzonatate (TESSALON) 200 MG capsule; 1 CAP UP TO 3 TIMES A DAY ONLY IF NEEDED FOR COUGH.  Dispense: 30 Cap; Refill: 0    5. Shortness of breath  - albuterol  108 (90 Base) MCG/ACT Aero Soln inhalation aerosol; 2 PUFFS EVERY 4 HOURS ONLY IF NEEDED FOR COUGH, WHEEZING, OR SHORTNESS OF BREATH.  Dispense: 1 Inhaler; Refill: 3      Discussed with him DDX and management options.    Due to severity of symptoms and exam findings, offered him aggressive antibiotic treatment. He would like.    Agreeable to medications given and prescribed.     Follow-up with PCP or urgent care if getting worse or not better with above.

## 2018-07-11 ENCOUNTER — OFFICE VISIT (OUTPATIENT)
Dept: URGENT CARE | Facility: PHYSICIAN GROUP | Age: 43
End: 2018-07-11
Payer: COMMERCIAL

## 2018-07-11 VITALS
HEART RATE: 84 BPM | RESPIRATION RATE: 18 BRPM | DIASTOLIC BLOOD PRESSURE: 84 MMHG | HEIGHT: 71 IN | OXYGEN SATURATION: 97 % | WEIGHT: 288.6 LBS | BODY MASS INDEX: 40.4 KG/M2 | TEMPERATURE: 98.6 F | SYSTOLIC BLOOD PRESSURE: 128 MMHG

## 2018-07-11 DIAGNOSIS — D17.23 LIPOMA OF RIGHT LOWER EXTREMITY: ICD-10-CM

## 2018-07-11 PROCEDURE — 99214 OFFICE O/P EST MOD 30 MIN: CPT | Performed by: PHYSICIAN ASSISTANT

## 2018-07-12 NOTE — PROGRESS NOTES
Chief Complaint   Patient presents with   • Foot Problem     needs a ref       HISTORY OF PRESENT ILLNESS: Patient is a 43 y.o. male who presents today for the following:    Patient comes in for evaluation of a lipoma on his right foot. He apparently has had this for years but has started become more mobile and causing worsening pain. He has worsening pain with shoes due to the extra pressure along with the mobility of the lesion. Walking is becoming more and more uncomfortable. He has a history of lipomas but nothing in his foot    Patient Active Problem List    Diagnosis Date Noted   • Morbid obesity with BMI of 50.0-59.9, adult (Formerly [facility]) 11/13/2017   • Pagophagia 11/13/2017   • Morbid obesity with BMI of 40.0-44.9, adult (Formerly [facility]) 11/13/2017   • Rheumatoid arthritis (Formerly [facility]) 09/08/2015   • Chronic pain of right knee 09/08/2015   • Adult ADHD 09/08/2010   • MONICA (obstructive sleep apnea) 03/09/2010   • Bleeding ulcer 05/21/2009   • Depression 05/21/2009   • Eczema 05/21/2009       Allergies:Nsaids; Penicillins; and Phenergan [promethazine hcl]    Current Outpatient Prescriptions Ordered in [facility]   Medication Sig Dispense Refill   • Adalimumab (HUMIRA PEN SC) Inject  as instructed.     • Methotrexate Sodium (METHOTREXATE PO) Take  by mouth.     • levoFLOXacin (LEVAQUIN) 500 MG tablet 1 TAB ONCE A DAY X 10 DAYS. 10 Tab 0   • benzonatate (TESSALON) 200 MG capsule 1 CAP UP TO 3 TIMES A DAY ONLY IF NEEDED FOR COUGH. 30 Cap 0   • ofloxacin (OCUFLOX) 0.3 % Solution 1-2 DROPS TO BOTH EYES EVERY 2-4 HOURS WHILE AWAKE. USE FOR ONE EXTRA DAY AFTER BETTER. 5 mL 1   • albuterol 108 (90 Base) MCG/ACT Aero Soln inhalation aerosol 2 PUFFS EVERY 4 HOURS ONLY IF NEEDED FOR COUGH, WHEEZING, OR SHORTNESS OF BREATH. 1 Inhaler 3   • lamotrigine (LAMICTAL) 150 MG tablet Take 1 Tab by mouth every day. 30 Tab 3   • esomeprazole magnesium (NEXIUM) 40 MG PACK Take 40 mg by mouth every morning before breakfast.     • Acetaminophen (TYLENOL PO) Take  by  "mouth.       No current Epic-ordered facility-administered medications on file.        Past Medical History:   Diagnosis Date   • ADD (attention deficit disorder)    • Alcohol abuse    • Allergic rhinitis 2009   • Anxiety 2009   • Back pain 2009   • Bipolar disorder ([facility]) ([facility])    • Bleeding ulcer 2009   • Depression 2009   • Eczema 2009   • History of bleeding ulcers 2015   • Insomnia 2009   • Migraine 2009   • Obesity, morbid ([facility]) ([facility]) 2009   • Rheumatoid arteritis    • Sleep apnea 2009       Social History   Substance Use Topics   • Smoking status: Former Smoker     Packs/day: 0.25     Years: 3.00     Types: Cigarettes     Quit date: 2001   • Smokeless tobacco: Never Used      Comment: occasional cigar   • Alcohol use No      Comment: quit 2.5 yrs ago       Family Status   Relation Status   • Mother Alive   • Father    • Maternal Grandmother    • Other    • Maternal Aunt      Family History   Problem Relation Age of Onset   • Hypertension Mother    • Arthritis Mother    • Depression Mother    • Cancer Father      bladder   • Schizophrenia Father    • Cancer Maternal Grandmother      breast   • Heart Disease Maternal Grandmother    • Psychiatry Other    • Stroke Maternal Aunt        Review of Systems:   Constitutional ROS: No unexpected change in weight, No weakness, No fatigue  Hematologic/Lymphatic ROS: No chills, No night sweats, No weight loss  Skin/Integumentary ROS: No edema, No evidence of rash, No itching      Exam:  Blood pressure 128/84, pulse 84, temperature 37 °C (98.6 °F), resp. rate 18, height 1.803 m (5' 11\"), weight (!) 130.9 kg (288 lb 9.6 oz), SpO2 97 %.  General: Well developed, well nourished. No distress.  Pulmonary: Unlabored respiratory effort.    Cardiovascular: Brisk capillary refill, right foot.  Extremities: Mobile firm lesion noted on the medial aspect of the right foot, almost at the arch that is approximately " 5 x 3 cm in diameter. The area itself is nontender to palpation without erythema but does have tenderness when pushed into the foot.  Psych: Normal mood. Alert and oriented x3. Judgment and insight is normal.    Assessment/Plan:  Referring to podiatry for further evaluation and possible removal.  1. Lipoma of right lower extremity

## 2018-08-09 ENCOUNTER — OFFICE VISIT (OUTPATIENT)
Dept: MEDICAL GROUP | Facility: MEDICAL CENTER | Age: 43
End: 2018-08-09
Payer: COMMERCIAL

## 2018-08-09 VITALS
SYSTOLIC BLOOD PRESSURE: 124 MMHG | HEART RATE: 69 BPM | WEIGHT: 279.6 LBS | DIASTOLIC BLOOD PRESSURE: 82 MMHG | TEMPERATURE: 97.8 F | HEIGHT: 71 IN | RESPIRATION RATE: 14 BRPM | BODY MASS INDEX: 39.14 KG/M2 | OXYGEN SATURATION: 98 %

## 2018-08-09 DIAGNOSIS — R01.1 MURMUR: ICD-10-CM

## 2018-08-09 DIAGNOSIS — M25.561 CHRONIC PAIN OF RIGHT KNEE: ICD-10-CM

## 2018-08-09 DIAGNOSIS — F32.A DEPRESSION, UNSPECIFIED DEPRESSION TYPE: ICD-10-CM

## 2018-08-09 DIAGNOSIS — Z13.6 SCREENING FOR CARDIOVASCULAR CONDITION: ICD-10-CM

## 2018-08-09 DIAGNOSIS — G89.29 CHRONIC PAIN OF RIGHT KNEE: ICD-10-CM

## 2018-08-09 DIAGNOSIS — E66.9 OBESITY (BMI 35.0-39.9 WITHOUT COMORBIDITY): ICD-10-CM

## 2018-08-09 DIAGNOSIS — M06.9 RHEUMATOID ARTHRITIS INVOLVING MULTIPLE SITES, UNSPECIFIED RHEUMATOID FACTOR PRESENCE: ICD-10-CM

## 2018-08-09 DIAGNOSIS — G47.33 OSA (OBSTRUCTIVE SLEEP APNEA): ICD-10-CM

## 2018-08-09 PROBLEM — E66.01 MORBID OBESITY WITH BMI OF 40.0-44.9, ADULT (HCC): Status: RESOLVED | Noted: 2017-11-13 | Resolved: 2018-08-09

## 2018-08-09 PROBLEM — E66.01 MORBID OBESITY WITH BMI OF 50.0-59.9, ADULT (HCC): Status: RESOLVED | Noted: 2017-11-13 | Resolved: 2018-08-09

## 2018-08-09 PROCEDURE — 99213 OFFICE O/P EST LOW 20 MIN: CPT | Performed by: NURSE PRACTITIONER

## 2018-08-09 ASSESSMENT — PAIN SCALES - GENERAL: PAINLEVEL: 4=SLIGHT-MODERATE PAIN

## 2018-08-09 NOTE — PROGRESS NOTES
cc:  Knee pain, referrals needed      Subjective:     HPI:     Richard Hubbard is a 43 y.o. male here to discuss the evaluation and management of:    Pain right knee  Patient states that he has had pain in his right knee for a long time.  States he thought he tore to long time ago in him 1 of his ligaments.  Patient denies any surgery.  States that it feels unstable and lucid.  States that about 6 months ago he tripped and slipped and fell on it but started hurting prior to then but it seemed to make it worse.  States that his pain feels very deep and is on the right outer side.   Now it cracks, no full movement, every morning very stiff and painful. Going up and down stairs hurts.     Sleep apnea  Not wearing his cpap. Needs referral again to sleep studies to get established because he let referral .    Rheumatoid arthritis  Patient states that he saw his rheumatologist and he was told that his murmur sounded little bit larger and recommends a echocardiogram.  States he does have some leg swelling.       Murmur  States has murmur.      ROS:  Denies any Headache, Blurred Vision, Confusion, Chest pain,  Shortness of breath,  Abdominal pain, Changes of bowel or bladder, Lower ext edema, Fevers, Nights sweats, Weight Changes, Focal weakness or numbness.  All other systems are negative.  Right knee pain, leg swelling        Current Outpatient Prescriptions:   •  ofloxacin (OCUFLOX) 0.3 % Solution, 1-2 DROPS TO BOTH EYES EVERY 2-4 HOURS WHILE AWAKE. USE FOR ONE EXTRA DAY AFTER BETTER., Disp: 5 mL, Rfl: 1  •  albuterol 108 (90 Base) MCG/ACT Aero Soln inhalation aerosol, 2 PUFFS EVERY 4 HOURS ONLY IF NEEDED FOR COUGH, WHEEZING, OR SHORTNESS OF BREATH., Disp: 1 Inhaler, Rfl: 3  •  Adalimumab (HUMIRA PEN SC), Inject  as instructed., Disp: , Rfl:   •  Methotrexate Sodium (METHOTREXATE PO), Take  by mouth., Disp: , Rfl:   •  esomeprazole magnesium (NEXIUM) 40 MG PACK, Take 40 mg by mouth every morning before breakfast.,  "Disp: , Rfl:   •  Acetaminophen (TYLENOL PO), Take  by mouth., Disp: , Rfl:     Allergies   Allergen Reactions   • Nsaids    • Penicillins    • Phenergan [Promethazine Hcl]        Past Medical History:   Diagnosis Date   • ADD (attention deficit disorder)    • Alcohol abuse    • Allergic rhinitis 5/21/2009   • Anxiety 5/21/2009   • Back pain 5/21/2009   • Bipolar disorder (HCC)    • Bleeding ulcer 5/21/2009   • Depression 5/21/2009   • Eczema 5/21/2009   • History of bleeding ulcers 2/12/2015   • Insomnia 5/21/2009   • Migraine 5/21/2009   • Obesity, morbid (HCC) 5/21/2009   • Rheumatoid arteritis    • Sleep apnea 5/21/2009     Past Surgical History:   Procedure Laterality Date   • GASTRIC BYPASS LAPAROSCOPIC       Family History   Problem Relation Age of Onset   • Hypertension Mother    • Arthritis Mother    • Depression Mother    • Cancer Father         bladder   • Schizophrenia Father    • Cancer Maternal Grandmother         breast   • Heart Disease Maternal Grandmother    • Psychiatry Other    • Stroke Maternal Aunt      Social History     Social History   • Marital status:      Spouse name: N/A   • Number of children: N/A   • Years of education: N/A     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      Social History Main Topics   • Smoking status: Former Smoker     Packs/day: 0.25     Years: 3.00     Types: Cigarettes     Quit date: 1/1/2001   • Smokeless tobacco: Never Used      Comment: occasional cigar   • Alcohol use No      Comment: quit 2.5 yrs ago   • Drug use: No   • Sexual activity: Yes     Partners: Female      Comment: ;      Other Topics Concern   • Not on file     Social History Narrative    , lives in [place]       Objective:     Vitals: /82   Pulse 69   Temp 36.6 °C (97.8 °F)   Resp 14   Ht 1.803 m (5' 11\")   Wt (!) 126.8 kg (279 lb 9.6 oz)   SpO2 98%   BMI 39.00 kg/m²    General: Alert, pleasant, NAD  HEENT: Normocephalic.    Heart: Regular rate and rhythm.  S1 and " S2 normal. grade 3 murmur  Respiratory: Normal respiratory effort.  Clear to auscultation bilaterally.  Skin: Warm, dry, no rashes.  Musculoskeletal: Gait is normal.  Moves all extremities well.  Extremities: No leg edema.  Pain on right lateral aspect with palpation of his knee.  Neurological: No tremors, sensation grossly intact, gait is normal,   Psych:  Affect/mood is normal, judgement is good, memory is intact, grooming is appropriate.    Assessment/Plan:      was seen today for knee pain.    Diagnoses and all orders for this visit:    Chronic pain of right knee  Patient does have an old MRI from 2010 of his knee however on the imaging report it says his left knee however he states that is incorrect as he has never had troubles with his left knee.  The imaging report states there is a tear in the ACL  Will refer to orthopedics.    -     REFERRAL TO ORTHOPEDICS    MONICA (obstructive sleep apnea)  Patient has not been using his CPAP as he does not have the correct pressures.  Refer back over to sleep studies.  -     REFERRAL TO SLEEP STUDIES  -     CBC WITHOUT DIFFERENTIAL; Future    Rheumatoid arthritis involving multiple sites, unspecified rheumatoid factor presence (HCC)  Follows up with rheumatology.  -     ECHOCARDIOGRAM COMP W/O CONT; Future  -     CBC WITHOUT DIFFERENTIAL; Future    Murmur  -     ECHOCARDIOGRAM COMP W/O CONT; Future    Obesity (BMI 35.0-39.9 without comorbidity)  Patient has actually lost some weight since his last visit here.  Congratulated him on his weight loss.  -     Patient identified as having weight management issue.  Appropriate orders and counseling given.    Depression, unspecified depression type  Has not been on his Lamictal for more than a few months.  States he did not mesh well with his previous psychiatrist.  Will refer him back over to psychiatry.  -     REFERRAL TO PSYCHIATRY    Screening for cardiovascular condition    -     LIPID PROFILE; Future       Health care  Maintenance:     Return in about 6 months (around 2/9/2019).          Brit JOSHUA.

## 2018-09-07 ENCOUNTER — HOSPITAL ENCOUNTER (OUTPATIENT)
Dept: LAB | Facility: MEDICAL CENTER | Age: 43
End: 2018-09-07
Attending: NURSE PRACTITIONER
Payer: COMMERCIAL

## 2018-09-07 DIAGNOSIS — Z13.6 SCREENING FOR CARDIOVASCULAR CONDITION: ICD-10-CM

## 2018-09-07 DIAGNOSIS — G47.33 OSA (OBSTRUCTIVE SLEEP APNEA): ICD-10-CM

## 2018-09-07 DIAGNOSIS — M06.9 RHEUMATOID ARTHRITIS INVOLVING MULTIPLE SITES, UNSPECIFIED RHEUMATOID FACTOR PRESENCE: ICD-10-CM

## 2018-09-07 LAB
CHOLEST SERPL-MCNC: 108 MG/DL (ref 100–199)
ERYTHROCYTE [DISTWIDTH] IN BLOOD BY AUTOMATED COUNT: 46.2 FL (ref 35.9–50)
HCT VFR BLD AUTO: 33.1 % (ref 42–52)
HDLC SERPL-MCNC: 66 MG/DL
HGB BLD-MCNC: 10.2 G/DL (ref 14–18)
LDLC SERPL CALC-MCNC: 35 MG/DL
MCH RBC QN AUTO: 26.2 PG (ref 27–33)
MCHC RBC AUTO-ENTMCNC: 30.8 G/DL (ref 33.7–35.3)
MCV RBC AUTO: 85.1 FL (ref 81.4–97.8)
PLATELET # BLD AUTO: 235 K/UL (ref 164–446)
PMV BLD AUTO: 9.2 FL (ref 9–12.9)
RBC # BLD AUTO: 3.89 M/UL (ref 4.7–6.1)
TRIGL SERPL-MCNC: 35 MG/DL (ref 0–149)
WBC # BLD AUTO: 4.9 K/UL (ref 4.8–10.8)

## 2018-09-07 PROCEDURE — 85027 COMPLETE CBC AUTOMATED: CPT

## 2018-09-07 PROCEDURE — 80061 LIPID PANEL: CPT

## 2018-09-07 PROCEDURE — 36415 COLL VENOUS BLD VENIPUNCTURE: CPT

## 2018-09-25 ENCOUNTER — NON-PROVIDER VISIT (OUTPATIENT)
Dept: MEDICAL GROUP | Facility: PHYSICIAN GROUP | Age: 43
End: 2018-09-25
Payer: COMMERCIAL

## 2018-09-25 DIAGNOSIS — Z23 NEED FOR INFLUENZA VACCINATION: ICD-10-CM

## 2018-09-25 PROCEDURE — 90471 IMMUNIZATION ADMIN: CPT | Performed by: NURSE PRACTITIONER

## 2018-09-25 PROCEDURE — 90686 IIV4 VACC NO PRSV 0.5 ML IM: CPT | Performed by: NURSE PRACTITIONER

## 2018-09-25 NOTE — NON-PROVIDER
"Richard Hubbard is a 43 y.o. male here for a non-provider visit for:   FLU    Reason for immunization: Annual Flu Vaccine  Immunization records indicate need for vaccine: Yes, confirmed with Epic  Minimum interval has been met for this vaccine: Yes  ABN completed: Not Indicated    Order and dose verified by: Katiana ROBERTS Dated   was given to patient: No  All IAC Questionnaire questions were answered \"No.\"    Patient tolerated injection and no adverse effects were observed or reported: Yes    Pt scheduled for next dose in series: Not Indicated    "

## 2018-10-09 ENCOUNTER — OFFICE VISIT (OUTPATIENT)
Dept: MEDICAL GROUP | Facility: MEDICAL CENTER | Age: 43
End: 2018-10-09
Payer: COMMERCIAL

## 2018-10-09 VITALS
HEIGHT: 71 IN | WEIGHT: 268 LBS | BODY MASS INDEX: 37.52 KG/M2 | RESPIRATION RATE: 16 BRPM | DIASTOLIC BLOOD PRESSURE: 80 MMHG | TEMPERATURE: 97.3 F | OXYGEN SATURATION: 94 % | SYSTOLIC BLOOD PRESSURE: 122 MMHG | HEART RATE: 78 BPM

## 2018-10-09 DIAGNOSIS — M25.531 RIGHT WRIST PAIN: ICD-10-CM

## 2018-10-09 DIAGNOSIS — G47.33 OSA (OBSTRUCTIVE SLEEP APNEA): Chronic | ICD-10-CM

## 2018-10-09 DIAGNOSIS — D64.9 ANEMIA, UNSPECIFIED TYPE: ICD-10-CM

## 2018-10-09 PROCEDURE — 99213 OFFICE O/P EST LOW 20 MIN: CPT | Performed by: NURSE PRACTITIONER

## 2018-10-09 NOTE — PROGRESS NOTES
cc:  Right wrist pain      Subjective:     HPI:     Richard Hubbard is a 43 y.o. male here to discuss the evaluation and management of:    Anemia  Reviewing labs he is anemic-. He has a history of anemia since Lucila en y back in 2000.    Knee pain  States he has had cortisol injections in both of his knees and it really helps for about 2 weeks.  States that there is some other sort of injections that he can do however they are about $250 each per his insurance.  Not sure if he would like to pursue this or not.    Sleep studies  Has not been able to get them scheduled yet.     Right Wrist pain  Patient states that he has been having some right wrist pain.  States that he noticed some swelling and he thought it was his rheumatoid arthritis flare.  Denies any pain to the touch however he is having pain and clicking when he is rotating and moving around his wrist.   No trauma or injury.  No numbness or tingling in fingers. Patient states that it is red on his middle finger and his ring finger on the top of his hand and goes up his wrist.  Feels like it might be a strained ligament.  He is requesting to go to orthopedics.      ROS:  Denies any Headache, Blurred Vision, Confusion, Chest pain,  Shortness of breath,  Abdominal pain, Changes of bowel or bladder, Lower ext edema, Fevers, Nights sweats, Weight Changes, Focal weakness or numbness.  And all other systems are negative.        Current Outpatient Prescriptions:   •  albuterol 108 (90 Base) MCG/ACT Aero Soln inhalation aerosol, 2 PUFFS EVERY 4 HOURS ONLY IF NEEDED FOR COUGH, WHEEZING, OR SHORTNESS OF BREATH., Disp: 1 Inhaler, Rfl: 3  •  ofloxacin (OCUFLOX) 0.3 % Solution, 1-2 DROPS TO BOTH EYES EVERY 2-4 HOURS WHILE AWAKE. USE FOR ONE EXTRA DAY AFTER BETTER., Disp: 5 mL, Rfl: 1  •  Adalimumab (HUMIRA PEN SC), Inject  as instructed., Disp: , Rfl:   •  Methotrexate Sodium (METHOTREXATE PO), Take  by mouth., Disp: , Rfl:   •  esomeprazole magnesium (NEXIUM) 40 MG PACK,  "Take 40 mg by mouth every morning before breakfast., Disp: , Rfl:   •  Acetaminophen (TYLENOL PO), Take  by mouth., Disp: , Rfl:     Allergies   Allergen Reactions   • Nsaids    • Penicillins    • Phenergan [Promethazine Hcl]        Past Medical History:   Diagnosis Date   • ADD (attention deficit disorder)    • Alcohol abuse    • Allergic rhinitis 5/21/2009   • Anxiety 5/21/2009   • Back pain 5/21/2009   • Bipolar disorder (HCC)    • Bleeding ulcer 5/21/2009   • Depression 5/21/2009   • Eczema 5/21/2009   • History of bleeding ulcers 2/12/2015   • Insomnia 5/21/2009   • Migraine 5/21/2009   • Obesity, morbid (HCC) 5/21/2009   • Rheumatoid arteritis    • Sleep apnea 5/21/2009     Past Surgical History:   Procedure Laterality Date   • GASTRIC BYPASS LAPAROSCOPIC  2000    Lucila en y     Family History   Problem Relation Age of Onset   • Hypertension Mother    • Arthritis Mother    • Depression Mother    • Cancer Father         bladder   • Schizophrenia Father    • Cancer Maternal Grandmother         breast   • Heart Disease Maternal Grandmother    • Psychiatry Other    • Stroke Maternal Aunt      Social History     Social History   • Marital status:      Spouse name: N/A   • Number of children: N/A   • Years of education: N/A     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      Social History Main Topics   • Smoking status: Former Smoker     Packs/day: 0.25     Years: 3.00     Types: Cigarettes     Quit date: 1/1/2001   • Smokeless tobacco: Never Used      Comment: occasional cigar   • Alcohol use No      Comment: quit 2.5 yrs ago   • Drug use: No   • Sexual activity: Yes     Partners: Female      Comment: ;      Other Topics Concern   • Not on file     Social History Narrative    , lives in [place]       Objective:     Vitals: /80 (BP Location: Right arm, Patient Position: Sitting)   Pulse 78   Temp 36.3 °C (97.3 °F)   Resp 16   Ht 1.803 m (5' 11\")   Wt 121.6 kg (268 lb)   SpO2 94%   BMI " 37.38 kg/m²    General: Alert, pleasant, NAD  HEENT: Normocephalic.    Skin: Warm, dry, no rashes.  Musculoskeletal: Gait is normal.  Moves all extremities well.  Extremities: No leg edema. No discoloration  Neurological: No tremors, sensation grossly intact  Psych:  Affect/mood is normal, judgement is good, memory is intact, grooming is appropriate.    Assessment/Plan:      was seen today for wrist pain.    Diagnoses and all orders for this visit:    Right wrist pain  Right wrist without any swelling, erythema or pain to the touch.  Patient states when rotating his wrist it causes him some discomfort.  Have discussed with patient that he can try using a wrist brace, ice and rest to see if this can help him.  He is requesting to go to orthopedics.  -     REFERRAL TO ORTHOPEDICS    Anemia, unspecified type  Chronic ever since his Lucila-en-Y procedure.  Last CBC was September 2018 H&H was 10.2/33.1.  Continue to follow-up with labs.    MONICA (obstructive sleep apnea)  Has not followed up with sleep studies yet.        Return in about 3 months (around 1/9/2019).        Brit RESENDIZ

## 2018-12-21 ENCOUNTER — OFFICE VISIT (OUTPATIENT)
Dept: URGENT CARE | Facility: MEDICAL CENTER | Age: 43
End: 2018-12-21
Payer: COMMERCIAL

## 2018-12-21 ENCOUNTER — HOSPITAL ENCOUNTER (OUTPATIENT)
Dept: CARDIOLOGY | Facility: MEDICAL CENTER | Age: 43
End: 2018-12-21
Attending: NURSE PRACTITIONER
Payer: COMMERCIAL

## 2018-12-21 VITALS
WEIGHT: 252.4 LBS | TEMPERATURE: 97.6 F | HEART RATE: 62 BPM | HEIGHT: 71 IN | DIASTOLIC BLOOD PRESSURE: 90 MMHG | BODY MASS INDEX: 35.34 KG/M2 | OXYGEN SATURATION: 95 % | SYSTOLIC BLOOD PRESSURE: 132 MMHG

## 2018-12-21 DIAGNOSIS — R01.1 MURMUR: ICD-10-CM

## 2018-12-21 DIAGNOSIS — M06.9 RHEUMATOID ARTHRITIS INVOLVING MULTIPLE SITES, UNSPECIFIED RHEUMATOID FACTOR PRESENCE: ICD-10-CM

## 2018-12-21 DIAGNOSIS — H66.001 ACUTE SUPPURATIVE OTITIS MEDIA OF RIGHT EAR WITHOUT SPONTANEOUS RUPTURE OF TYMPANIC MEMBRANE, RECURRENCE NOT SPECIFIED: ICD-10-CM

## 2018-12-21 LAB
LV EJECT FRACT  99904: 60
LV EJECT FRACT MOD 2C 99903: 64.26
LV EJECT FRACT MOD 4C 99902: 64.39
LV EJECT FRACT MOD BP 99901: 64.31

## 2018-12-21 PROCEDURE — 93306 TTE W/DOPPLER COMPLETE: CPT

## 2018-12-21 PROCEDURE — 93306 TTE W/DOPPLER COMPLETE: CPT | Mod: 26 | Performed by: INTERNAL MEDICINE

## 2018-12-21 PROCEDURE — 99214 OFFICE O/P EST MOD 30 MIN: CPT | Performed by: FAMILY MEDICINE

## 2018-12-21 RX ORDER — PREDNISONE 20 MG/1
40 TABLET ORAL EVERY MORNING
Qty: 12 TAB | Refills: 0 | Status: SHIPPED | OUTPATIENT
Start: 2018-12-21 | End: 2018-12-27

## 2018-12-21 RX ORDER — CEFDINIR 300 MG/1
300 CAPSULE ORAL EVERY 12 HOURS
Qty: 14 CAP | Refills: 0 | Status: SHIPPED | OUTPATIENT
Start: 2018-12-21 | End: 2018-12-28

## 2018-12-21 ASSESSMENT — ENCOUNTER SYMPTOMS
FOCAL WEAKNESS: 0
CHILLS: 0
FEVER: 0
COUGH: 0
DIZZINESS: 0

## 2018-12-21 NOTE — PROGRESS NOTES
Subjective:      Richard Hubbard is a 43 y.o. male who presents with Ear Fullness (ear pressure/ coughing/ drainage/ chills/ bodyaches X2 days )      - This is a very pleasant, well and non-toxic appearing 43 y.o. male with complaints of Rt ear pressure x 2 days, no trauma or NVFC          ALLERGIES:  Nsaids; Penicillins; and Phenergan [promethazine hcl]     PMH:  Past Medical History:   Diagnosis Date   • ADD (attention deficit disorder)    • Alcohol abuse    • Allergic rhinitis 5/21/2009   • Anxiety 5/21/2009   • Back pain 5/21/2009   • Bipolar disorder (HCC)    • Bleeding ulcer 5/21/2009   • Depression 5/21/2009   • Eczema 5/21/2009   • History of bleeding ulcers 2/12/2015   • Insomnia 5/21/2009   • Migraine 5/21/2009   • Obesity, morbid (HCC) 5/21/2009   • Rheumatoid arteritis    • Sleep apnea 5/21/2009        MEDS:    Current Outpatient Prescriptions:   •  cefdinir (OMNICEF) 300 MG Cap, Take 1 Cap by mouth every 12 hours for 7 days., Disp: 14 Cap, Rfl: 0  •  predniSONE (DELTASONE) 20 MG Tab, Take 2 Tabs by mouth every morning for 6 days., Disp: 12 Tab, Rfl: 0  •  Adalimumab (HUMIRA PEN SC), Inject  as instructed., Disp: , Rfl:   •  Acetaminophen (TYLENOL PO), Take  by mouth., Disp: , Rfl:   •  ofloxacin (OCUFLOX) 0.3 % Solution, 1-2 DROPS TO BOTH EYES EVERY 2-4 HOURS WHILE AWAKE. USE FOR ONE EXTRA DAY AFTER BETTER. (Patient not taking: Reported on 12/21/2018), Disp: 5 mL, Rfl: 1  •  albuterol 108 (90 Base) MCG/ACT Aero Soln inhalation aerosol, 2 PUFFS EVERY 4 HOURS ONLY IF NEEDED FOR COUGH, WHEEZING, OR SHORTNESS OF BREATH., Disp: 1 Inhaler, Rfl: 3  •  Methotrexate Sodium (METHOTREXATE PO), Take  by mouth., Disp: , Rfl:   •  esomeprazole magnesium (NEXIUM) 40 MG PACK, Take 40 mg by mouth every morning before breakfast., Disp: , Rfl:     ** I have documented what I find to be significant in regards to past medical, social, family and surgical history  in my HPI or under PMH/PSH/FH review section, otherwise it  "is contributory **         HPI    Review of Systems   Constitutional: Negative for chills and fever.   HENT: Positive for congestion and ear pain.    Respiratory: Negative for cough.    Neurological: Negative for dizziness and focal weakness.          Objective:     /90   Pulse 62   Temp 36.4 °C (97.6 °F) (Temporal)   Ht 1.803 m (5' 11\")   Wt 114.5 kg (252 lb 6.4 oz)   SpO2 95%   BMI 35.20 kg/m²      Physical Exam   Constitutional: He appears well-developed. No distress.   HENT:   Head: Normocephalic and atraumatic.   Mouth/Throat: Oropharynx is clear and moist.   Cardiovascular: Regular rhythm.    No murmur heard.  Pulmonary/Chest: Effort normal and breath sounds normal. No respiratory distress.   Neurological: He is alert. He exhibits normal muscle tone.   Skin: Skin is warm and dry.   Psychiatric: He has a normal mood and affect. Judgment normal.   Nursing note and vitals reviewed.  Rt TM red/dull/full             Assessment/Plan:         1. Acute suppurative otitis media of right ear without spontaneous rupture of tympanic membrane, recurrence not specified  cefdinir (OMNICEF) 300 MG Cap    predniSONE (DELTASONE) 20 MG Tab             Dx & d/c instructions discussed w/ patient and/or family members.     ER precautions (worsening signs symptoms and when to go to ER) discussed.    Follow up w/ PCP in 2-3 days to make sure symptoms improving and no further intervention/treatment and/or work-up needed was advised, ER if feeling worse or not improving in 2 days.    Possible side effects (i.e. Rash, GI upset/constipation, sedation, elevation of BP or sugars) of any medications given discussed.     Patient left in stable condition              "

## 2018-12-27 DIAGNOSIS — I35.0 AORTIC VALVE STENOSIS, ETIOLOGY OF CARDIAC VALVE DISEASE UNSPECIFIED: ICD-10-CM

## 2019-03-08 ENCOUNTER — OFFICE VISIT (OUTPATIENT)
Dept: VASCULAR LAB | Facility: MEDICAL CENTER | Age: 44
End: 2019-03-08
Attending: FAMILY MEDICINE
Payer: COMMERCIAL

## 2019-03-08 VITALS
BODY MASS INDEX: 33.64 KG/M2 | WEIGHT: 240.3 LBS | HEART RATE: 66 BPM | SYSTOLIC BLOOD PRESSURE: 116 MMHG | DIASTOLIC BLOOD PRESSURE: 68 MMHG | HEIGHT: 71 IN

## 2019-03-08 DIAGNOSIS — I35.0 AORTIC VALVE STENOSIS, ETIOLOGY OF CARDIAC VALVE DISEASE UNSPECIFIED: ICD-10-CM

## 2019-03-08 DIAGNOSIS — M06.9 RHEUMATOID ARTHRITIS INVOLVING MULTIPLE SITES, UNSPECIFIED RHEUMATOID FACTOR PRESENCE: Chronic | ICD-10-CM

## 2019-03-08 DIAGNOSIS — I77.810 AORTIC ROOT DILATATION (HCC): ICD-10-CM

## 2019-03-08 DIAGNOSIS — G47.33 OSA (OBSTRUCTIVE SLEEP APNEA): Chronic | ICD-10-CM

## 2019-03-08 DIAGNOSIS — R00.2 INTERMITTENT PALPITATIONS: ICD-10-CM

## 2019-03-08 DIAGNOSIS — I35.1 AORTIC EJECTION MURMUR: ICD-10-CM

## 2019-03-08 PROCEDURE — 99214 OFFICE O/P EST MOD 30 MIN: CPT | Performed by: FAMILY MEDICINE

## 2019-03-08 PROCEDURE — 99212 OFFICE O/P EST SF 10 MIN: CPT | Performed by: FAMILY MEDICINE

## 2019-03-08 RX ORDER — LOSARTAN POTASSIUM 25 MG/1
25 TABLET ORAL DAILY
Qty: 90 TAB | Refills: 3 | Status: ON HOLD | OUTPATIENT
Start: 2019-03-08 | End: 2019-10-17

## 2019-03-08 ASSESSMENT — ENCOUNTER SYMPTOMS
TREMORS: 0
FEVER: 0
NERVOUS/ANXIOUS: 0
BLURRED VISION: 0
SORE THROAT: 0
BLOOD IN STOOL: 0
PALPITATIONS: 0
COUGH: 0
HEADACHES: 0
ORTHOPNEA: 0
VOMITING: 0
SEIZURES: 0
BRUISES/BLEEDS EASILY: 0
INSOMNIA: 0
MYALGIAS: 0
DOUBLE VISION: 0
ABDOMINAL PAIN: 0
WEAKNESS: 0
DIARRHEA: 0
FOCAL WEAKNESS: 0
HEMOPTYSIS: 0
WHEEZING: 0
DEPRESSION: 0
NAUSEA: 0
DIZZINESS: 0
CHILLS: 0
SHORTNESS OF BREATH: 0

## 2019-03-08 NOTE — PATIENT INSTRUCTIONS
1) keep BP log   2) check labs   3) check zio patch   4) see sleep doctor   5) repeat echocardiogram in 1 year   6) start losartan 25mg at bedtime     - consume diet that emphasizes intake of vegetables, fruits, and whole grains,  - use low fat diary products, poultry, fish, legumes, nontropical vegetable oils, nuts  - limit intake of sweets, sugar-sweetned beverages, and red meats   - reduce saturated and trans fats to <6% of your daily calories   - consume no more than 2,400mg of sodium daily (look at food labels)  - healthy diet plans include:   - DASH diet (dashdiet.org), the [employer] food pattern,    - plate method (https://www.KnowledgeVisionmyplate.gov/)  - AHA diet  (http://www.heart.org/en/healthy-living/healthy-eating/eat-smart/nutrition-basics/aha-diet-and-lifestyle-recommendations)   - Mediterranean diet (http://www.heart.org/en/healthy-living/healthy-eating/eat-smart/nutrition-basics/mediterranean-diet)     - engage in moderate to vigorous physical activity such as brisk walking, swimming, cycling, >150 minutes per week at 30-40 minutes per session, 3 to 5 times weekly

## 2019-03-08 NOTE — PROGRESS NOTES
INITIAL VASCULAR MED VISIT  Subjective:   Richard Hubbard is a 43 y.o. male who presents today 3/8/2019 for   Chief Complaint   Patient presents with   • New Patient     Aortic Valve Stenosis   referred by PCP for eval for vascular disease assessment in context of Ao stenosis.     HPI:    Ao root dilatation with moderate Ao stenosis:  Had echo 12/2018 due to worsening murmur per rheumatologist.  Had had murmur for many years since his teens.  Reports no hx of recurrent strep or rheum fever.  No hx of syphlis or connective tissue disorders.  No fhx of Aortic disease.  Also noted to have mild Ao dilatation = 3.9cm.  Notes intermittent palpitations, no CP, SOB, dizz or syncope.  No postural syncopal episodes.     RA:  Currently stable, using humira and MTX but not using MTX.  Seeing Dr. Conway.  More swelling w/o overt joint degeneration. Has nodules on feet.      HTN:  No prior hx of HTN or hx of use of antiHTN meds.     MONICA:  Supposed on to be CPAP, machine needs to be titrated.  Has not seen sleep MD for 2 years.   Reports snoring and lack of restful sleep.      Antiplatelet/anticoagulation: No    Hyperlipidemia: No, last lipid panel 9/2018 with normal findings.     Clinical evidence of ASCVD:     1) hx of MI/ACS:  No   2) coronary or other revascularization procedure: No   3) TIA/ischemic CVA: No   4) PAD (including SUSHMA <0.9): No   5) documented (CAD, Renal athero, AA due to athero, carotid plaque >50% stenosis: No    Major RFs:     1) Age (Men>44, women>54):  no   2) Fhx early CAD (<55 men, <65 women):  no   3) cigarette smoking:  No   4) high BP >139/89 or on tx: no   5) Low HDL-C (<40 men, <50 women):  no    Other ASCVD risk factors:     T1/T2D: No   CKD:  No   Sleeping disorder/MONICA: No   Hypothyroidism: No    Past Medical History:   Diagnosis Date   • ADD (attention deficit disorder)    • Alcohol abuse    • Allergic rhinitis 5/21/2009   • Anxiety 5/21/2009   • Back pain 5/21/2009   • Bipolar disorder (HCC)     • Bleeding ulcer 5/21/2009   • Depression 5/21/2009   • Eczema 5/21/2009   • History of bleeding ulcers 2/12/2015   • Insomnia 5/21/2009   • Migraine 5/21/2009   • Obesity, morbid (HCC) 5/21/2009   • Rheumatoid arteritis    • Sleep apnea 5/21/2009     Past Surgical History:   Procedure Laterality Date   • GASTRIC BYPASS LAPAROSCOPIC  2000    Lucila en y     Family History   Problem Relation Age of Onset   • Hypertension Mother    • Arthritis Mother    • Depression Mother    • Cancer Father         bladder   • Schizophrenia Father    • Cancer Maternal Grandmother         breast   • Heart Disease Maternal Grandmother    • Psychiatry Other    • Stroke Maternal Aunt    • Other Neg Hx         no connective tissue disorders or known aortic disease     History   Smoking Status   • Former Smoker   • Packs/day: 0.25   • Years: 3.00   • Types: Cigarettes, Cigars   • Quit date: 1/1/2001   Smokeless Tobacco   • Never Used     Comment: occasional cigar     Social History   Substance Use Topics   • Smoking status: Former Smoker     Packs/day: 0.25     Years: 3.00     Types: Cigarettes, Cigars     Quit date: 1/1/2001   • Smokeless tobacco: Never Used      Comment: occasional cigar   • Alcohol use No      Comment: quit 2.5 yrs ago     Outpatient Encounter Prescriptions as of 3/8/2019   Medication Sig Dispense Refill   • losartan (COZAAR) 25 MG Tab Take 1 Tab by mouth every day for 360 days. 90 Tab 3   • albuterol 108 (90 Base) MCG/ACT Aero Soln inhalation aerosol 2 PUFFS EVERY 4 HOURS ONLY IF NEEDED FOR COUGH, WHEEZING, OR SHORTNESS OF BREATH. 1 Inhaler 3   • Adalimumab (HUMIRA PEN SC) Inject  as instructed.     • esomeprazole magnesium (NEXIUM) 40 MG PACK Take 40 mg by mouth every morning before breakfast.     • Acetaminophen (TYLENOL PO) Take  by mouth.     • ofloxacin (OCUFLOX) 0.3 % Solution 1-2 DROPS TO BOTH EYES EVERY 2-4 HOURS WHILE AWAKE. USE FOR ONE EXTRA DAY AFTER BETTER. (Patient not taking: Reported on 12/21/2018) 5 mL  "1   • Methotrexate Sodium (METHOTREXATE PO) Take  by mouth.       No facility-administered encounter medications on file as of 3/8/2019.      Allergies   Allergen Reactions   • Nsaids    • Penicillins    • Phenergan [Promethazine Hcl]      DIET AND EXERCISE:  Weight Change: lost 100lb over 3 years   BMI Readings from Last 4 Encounters:   03/08/19 33.52 kg/m²   12/21/18 35.20 kg/m²   10/09/18 37.38 kg/m²   08/09/18 39.00 kg/m²      Diet: common adult with decrease portion sizes   Exercise: moderate regular exercise program     Review of Systems   Constitutional: Negative for chills, fever and malaise/fatigue.   HENT: Negative for nosebleeds, sore throat and tinnitus.    Eyes: Negative for blurred vision and double vision.   Respiratory: Negative for cough, hemoptysis, shortness of breath and wheezing.    Cardiovascular: Negative for chest pain, palpitations, orthopnea and leg swelling.   Gastrointestinal: Negative for abdominal pain, blood in stool, diarrhea, melena, nausea and vomiting.   Genitourinary: Negative for hematuria.   Musculoskeletal: Negative for joint pain and myalgias.   Skin: Negative for itching and rash.   Neurological: Negative for dizziness, tremors, focal weakness, seizures, weakness and headaches.   Endo/Heme/Allergies: Does not bruise/bleed easily.   Psychiatric/Behavioral: Negative for depression. The patient is not nervous/anxious and does not have insomnia.       Objective:     Vitals:    03/08/19 1110   BP: 116/68   BP Location: Left arm   Patient Position: Sitting   BP Cuff Size: Large adult   Pulse: 66   Weight: 109 kg (240 lb 4.8 oz)   Height: 1.803 m (5' 11\")      BP Readings from Last 4 Encounters:   03/08/19 116/68   12/21/18 132/90   10/09/18 122/80   08/09/18 124/82      Body mass index is 33.52 kg/m².     Physical Exam   Constitutional: He is oriented to person, place, and time. He appears well-developed and well-nourished. He is cooperative. No distress.   HENT:   Head: " Normocephalic and atraumatic.   Mouth/Throat: Oropharynx is clear and moist and mucous membranes are normal.   Eyes: Pupils are equal, round, and reactive to light. Conjunctivae are normal.   Neck: Trachea normal and normal range of motion. Neck supple. Normal carotid pulses and no JVD present. Carotid bruit is not present. No thyromegaly present.   Cardiovascular: Normal rate, regular rhythm and intact distal pulses.  PMI is not displaced.  Exam reveals no gallop and no friction rub.    Murmur heard.   Crescendo decrescendo systolic (harsh) murmur is present with a grade of 3/6    No diastolic murmur is present   Pulses:       Carotid pulses are 2+ on the right side, and 2+ on the left side.       Radial pulses are 2+ on the right side, and 2+ on the left side.        Dorsalis pedis pulses are 2+ on the right side, and 2+ on the left side.        Posterior tibial pulses are 2+ on the right side, and 2+ on the left side.   Edema:    RLE: none     LLE: none   Spider telangectasia:       RLE:  None      LLE: none   Varicosities:         RLE: none      LLE: none   Corona phlebectatica:      RLE:  None        LLE:  None   Cording:         RLE:  None     LLE: None    Pulmonary/Chest: Effort normal and breath sounds normal. No respiratory distress.   Abdominal: Soft. Bowel sounds are normal. He exhibits no distension and no mass. There is no hepatosplenomegaly. There is no tenderness. There is no rebound and no guarding.   Musculoskeletal: He exhibits no edema.   Lymphadenopathy:     He has no cervical adenopathy.   Neurological: He is alert and oriented to person, place, and time. No cranial nerve deficit. Coordination and gait normal.   Skin: Skin is warm and dry. He is not diaphoretic. No cyanosis. No pallor. Nails show no clubbing.   Psychiatric: He has a normal mood and affect.     Lab Results   Component Value Date    CHOLSTRLTOT 108 09/07/2018    LDL 35 09/07/2018    HDL 66 09/07/2018    TRIGLYCERIDE 35 09/07/2018                  Lab Results   Component Value Date    WBC 4.9 09/07/2018    RBC 3.89 (L) 09/07/2018    HEMOGLOBIN 10.2 (L) 09/07/2018    HEMATOCRIT 33.1 (L) 09/07/2018    MCV 85.1 09/07/2018    MCH 26.2 (L) 09/07/2018    MCHC 30.8 (L) 09/07/2018    MPV 9.2 09/07/2018      VASCULAR IMAGING:     Echo 12/21/18  No prior study is available for comparison.   Normal left ventricular systolic function.  Left ventricular ejection fraction is visually estimated to be 60%.  Normal diastolic function.  Mildly dilated right ventricle. Normal right ventricular systolic   function.  Normal inferior vena cava size without inspiratory collapse.  Aortic valve is not well visualized. probably fused right and left   cusps. Moderate aortic stenosis. Transvalvular gradients are - Peak: 54   mmHg, Mean: 38 mmHg.   Estimated right ventricular systolic pressure  is 40 mmHg.  Dilated aortic root measuring 3.9 cm    Medical Decision Making:  Today's Assessment / Status / Plan:     1. Aortic root dilatation (HCC)  RPR (SYPHILIS)    TSH   2. Aortic valve stenosis, etiology of cardiac valve disease unspecified  CBC WITH DIFFERENTIAL    Comp Metabolic Panel    RPR (SYPHILIS)    TSH    Lipid Profile    moderate, possibly fused cusps     3. MONICA (obstructive sleep apnea)  REFERRAL TO SLEEP STUDIES   4. Rheumatoid arthritis involving multiple sites, unspecified rheumatoid factor presence (HCC)  Lipid Profile   5. Adult BMI 33.0-33.9 kg/sq m  TSH   6. Aortic ejection murmur     7. Intermittent palpitations  ZIO PATCH MONITOR    Lipid Profile     Patient Type: Primary Prevention and RA     Etiology of Established CVD if Present: n/a   RA is consider risk equivalent     Lipid Management: Qualifies for Statin Therapy Based on 2013 ACC/AHA Guidelines: no  Calculated 10-Year Risk of ASCVD: <7.5%   Currently on Statin: No  NLA Risk Category:   Moderate: RA as risk equivalent   Tx threshold:  non-HDL-C >159, LDL-C >129  Tx goal:  non-HDL <130, LDL-C <100  (optional: apoB<90)   At goal:  yes  Tx plan:  - consider additional testing:  CAC>299, hsCRP>1.9, Lp(a)>49, ACR>29  - continue TLC measures, update labs     Blood Pressure Management:ACC/AHA (2017) goal <130/80  Home BP at goal:  yes  Office BP at goal:  Yes  Afterload reduction with ARB and/or BB appropriate to reduce Ao root dilatation progression.    Plan:   - continue home BP monitoring, reviewed correct technique:  Yes   - order 24h ABPM:  NO  - monitor lytes/gfr routinely   - start losartan 25mg QHS     Glycemic Status: Normal  - update labs, continue TLC     Anti-Platelet/Anti-Coagulant Tx: no  - could consider plavix in the future     Smoking:  Former, continued complete avoidance of all tobacco products      Physical Activity: continue healthy activity to improve CV fitness, see care instructions     Weight Management and Nutrition: Dietary plan was discussed with patient at this visit including DASH, low sodium as outlined in care instructions     Other:   1) Aortic root dilatation, 3.9cm, asymptomatic.  Appears to be sporadic.  No body habitus of note for marfans or EDS.  No other in family affected.    - update labs  - start losartan 25mg   - repeat echo annually in December     2) Moderate Ao Stenosis with murmur. No symptoms.   - reviewed anatomy, monitor  - consider cardiology involvement if worsening over time     3) palpitations - check Zio patch for 2 weeks     4) MONICA - ref back to sleep medicine to re-establish and start CPAP to reduce heart strain     5) RA - on biologics, defer mgmt to rheumatology.  Will consider as CV risk equivalent.     Instructed to follow-up with PCP for remainder of adult medical needs: yes  We will partner with other providers in the management of established vascular disease and cardiometabolic risk factors.    Studies to Be Obtained: zio patch now, Repeat echo 12/2019  Labs to Be Obtained:  Rpr, tsh, lipid, CMP    Follow up in: 6 weeks with me     Wojciech Silva,  M.D.     Agree with above.  Based on echocardiographic findings, my suspicion is that this may be a bicuspid aortic valve.  We will continue to follow carefully    Michael Bloch, MD  Vascular Medicine

## 2019-04-26 ENCOUNTER — APPOINTMENT (OUTPATIENT)
Dept: VASCULAR LAB | Facility: MEDICAL CENTER | Age: 44
End: 2019-04-26
Payer: COMMERCIAL

## 2019-05-01 ENCOUNTER — HOSPITAL ENCOUNTER (OUTPATIENT)
Dept: LAB | Facility: MEDICAL CENTER | Age: 44
End: 2019-05-01
Attending: FAMILY MEDICINE
Payer: COMMERCIAL

## 2019-05-01 ENCOUNTER — HOSPITAL ENCOUNTER (OUTPATIENT)
Dept: LAB | Facility: MEDICAL CENTER | Age: 44
End: 2019-05-01
Attending: INTERNAL MEDICINE
Payer: COMMERCIAL

## 2019-05-01 DIAGNOSIS — M06.9 RHEUMATOID ARTHRITIS INVOLVING MULTIPLE SITES, UNSPECIFIED RHEUMATOID FACTOR PRESENCE: Chronic | ICD-10-CM

## 2019-05-01 DIAGNOSIS — I35.0 AORTIC VALVE STENOSIS, ETIOLOGY OF CARDIAC VALVE DISEASE UNSPECIFIED: ICD-10-CM

## 2019-05-01 DIAGNOSIS — R00.2 INTERMITTENT PALPITATIONS: ICD-10-CM

## 2019-05-01 DIAGNOSIS — I77.810 AORTIC ROOT DILATATION (HCC): ICD-10-CM

## 2019-05-01 LAB
ALBUMIN SERPL BCP-MCNC: 3 G/DL (ref 3.2–4.9)
ALBUMIN SERPL BCP-MCNC: 3.1 G/DL (ref 3.2–4.9)
ALBUMIN/GLOB SERPL: 1.3 G/DL
ALP SERPL-CCNC: 105 U/L (ref 30–99)
ALT SERPL-CCNC: 16 U/L (ref 2–50)
ALT SERPL-CCNC: 18 U/L (ref 2–50)
ANION GAP SERPL CALC-SCNC: 6 MMOL/L (ref 0–11.9)
AST SERPL-CCNC: 17 U/L (ref 12–45)
AST SERPL-CCNC: 19 U/L (ref 12–45)
BASOPHILS # BLD AUTO: 0.9 % (ref 0–1.8)
BASOPHILS # BLD AUTO: 1.1 % (ref 0–1.8)
BASOPHILS # BLD: 0.06 K/UL (ref 0–0.12)
BASOPHILS # BLD: 0.07 K/UL (ref 0–0.12)
BILIRUB SERPL-MCNC: 0.3 MG/DL (ref 0.1–1.5)
BUN SERPL-MCNC: 15 MG/DL (ref 8–22)
CALCIUM SERPL-MCNC: 8 MG/DL (ref 8.5–10.5)
CHLORIDE SERPL-SCNC: 106 MMOL/L (ref 96–112)
CHOLEST SERPL-MCNC: 114 MG/DL (ref 100–199)
CO2 SERPL-SCNC: 27 MMOL/L (ref 20–33)
COMMENT 1642: NORMAL
COMMENT 1642: NORMAL
CREAT SERPL-MCNC: 1.01 MG/DL (ref 0.5–1.4)
CREAT SERPL-MCNC: 1.02 MG/DL (ref 0.5–1.4)
CRP SERPL HS-MCNC: 0.16 MG/DL (ref 0–0.75)
EOSINOPHIL # BLD AUTO: 0.04 K/UL (ref 0–0.51)
EOSINOPHIL # BLD AUTO: 0.05 K/UL (ref 0–0.51)
EOSINOPHIL NFR BLD: 0.6 % (ref 0–6.9)
EOSINOPHIL NFR BLD: 0.8 % (ref 0–6.9)
ERYTHROCYTE [DISTWIDTH] IN BLOOD BY AUTOMATED COUNT: 47.9 FL (ref 35.9–50)
ERYTHROCYTE [DISTWIDTH] IN BLOOD BY AUTOMATED COUNT: 48.2 FL (ref 35.9–50)
ERYTHROCYTE [SEDIMENTATION RATE] IN BLOOD BY WESTERGREN METHOD: 7 MM/HOUR (ref 0–15)
GLOBULIN SER CALC-MCNC: 2.3 G/DL (ref 1.9–3.5)
GLUCOSE SERPL-MCNC: 75 MG/DL (ref 65–99)
HCT VFR BLD AUTO: 30.5 % (ref 42–52)
HCT VFR BLD AUTO: 30.7 % (ref 42–52)
HDLC SERPL-MCNC: 64 MG/DL
HGB BLD-MCNC: 9 G/DL (ref 14–18)
HGB BLD-MCNC: 9.1 G/DL (ref 14–18)
IMM GRANULOCYTES # BLD AUTO: 0.01 K/UL (ref 0–0.11)
IMM GRANULOCYTES # BLD AUTO: 0.02 K/UL (ref 0–0.11)
IMM GRANULOCYTES NFR BLD AUTO: 0.2 % (ref 0–0.9)
IMM GRANULOCYTES NFR BLD AUTO: 0.3 % (ref 0–0.9)
LDLC SERPL CALC-MCNC: 37 MG/DL
LYMPHOCYTES # BLD AUTO: 2.67 K/UL (ref 1–4.8)
LYMPHOCYTES # BLD AUTO: 2.73 K/UL (ref 1–4.8)
LYMPHOCYTES NFR BLD: 41.9 % (ref 22–41)
LYMPHOCYTES NFR BLD: 42.3 % (ref 22–41)
MCH RBC QN AUTO: 24.6 PG (ref 27–33)
MCH RBC QN AUTO: 24.7 PG (ref 27–33)
MCHC RBC AUTO-ENTMCNC: 29.5 G/DL (ref 33.7–35.3)
MCHC RBC AUTO-ENTMCNC: 29.6 G/DL (ref 33.7–35.3)
MCV RBC AUTO: 83.3 FL (ref 81.4–97.8)
MCV RBC AUTO: 83.4 FL (ref 81.4–97.8)
MONOCYTES # BLD AUTO: 0.56 K/UL (ref 0–0.85)
MONOCYTES # BLD AUTO: 0.57 K/UL (ref 0–0.85)
MONOCYTES NFR BLD AUTO: 8.8 % (ref 0–13.4)
MONOCYTES NFR BLD AUTO: 8.8 % (ref 0–13.4)
MORPHOLOGY BLD-IMP: NORMAL
MORPHOLOGY BLD-IMP: NORMAL
NEUTROPHILS # BLD AUTO: 3.01 K/UL (ref 1.82–7.42)
NEUTROPHILS # BLD AUTO: 3.03 K/UL (ref 1.82–7.42)
NEUTROPHILS NFR BLD: 47 % (ref 44–72)
NEUTROPHILS NFR BLD: 47.3 % (ref 44–72)
NRBC # BLD AUTO: 0 K/UL
NRBC # BLD AUTO: 0 K/UL
NRBC BLD-RTO: 0 /100 WBC
NRBC BLD-RTO: 0 /100 WBC
OVALOCYTES BLD QL SMEAR: NORMAL
OVALOCYTES BLD QL SMEAR: NORMAL
PLATELET # BLD AUTO: 249 K/UL (ref 164–446)
PLATELET # BLD AUTO: 250 K/UL (ref 164–446)
PLATELET BLD QL SMEAR: NORMAL
PLATELET BLD QL SMEAR: NORMAL
PMV BLD AUTO: 8.9 FL (ref 9–12.9)
PMV BLD AUTO: 9 FL (ref 9–12.9)
POIKILOCYTOSIS BLD QL SMEAR: NORMAL
POIKILOCYTOSIS BLD QL SMEAR: NORMAL
POTASSIUM SERPL-SCNC: 3.9 MMOL/L (ref 3.6–5.5)
PROT SERPL-MCNC: 5.4 G/DL (ref 6–8.2)
RBC # BLD AUTO: 3.66 M/UL (ref 4.7–6.1)
RBC # BLD AUTO: 3.68 M/UL (ref 4.7–6.1)
RBC BLD AUTO: PRESENT
RBC BLD AUTO: PRESENT
SODIUM SERPL-SCNC: 139 MMOL/L (ref 135–145)
TRIGL SERPL-MCNC: 64 MG/DL (ref 0–149)
WBC # BLD AUTO: 6.4 K/UL (ref 4.8–10.8)
WBC # BLD AUTO: 6.5 K/UL (ref 4.8–10.8)

## 2019-05-01 PROCEDURE — 86780 TREPONEMA PALLIDUM: CPT

## 2019-05-01 PROCEDURE — 36415 COLL VENOUS BLD VENIPUNCTURE: CPT

## 2019-05-01 PROCEDURE — 85652 RBC SED RATE AUTOMATED: CPT

## 2019-05-01 PROCEDURE — 80061 LIPID PANEL: CPT

## 2019-05-01 PROCEDURE — 80053 COMPREHEN METABOLIC PANEL: CPT

## 2019-05-01 PROCEDURE — 82040 ASSAY OF SERUM ALBUMIN: CPT

## 2019-05-01 PROCEDURE — 85025 COMPLETE CBC W/AUTO DIFF WBC: CPT

## 2019-05-01 PROCEDURE — 82565 ASSAY OF CREATININE: CPT

## 2019-05-01 PROCEDURE — 85025 COMPLETE CBC W/AUTO DIFF WBC: CPT | Mod: 91

## 2019-05-01 PROCEDURE — 84450 TRANSFERASE (AST) (SGOT): CPT

## 2019-05-01 PROCEDURE — 84443 ASSAY THYROID STIM HORMONE: CPT

## 2019-05-01 PROCEDURE — 86140 C-REACTIVE PROTEIN: CPT

## 2019-05-01 PROCEDURE — 84460 ALANINE AMINO (ALT) (SGPT): CPT

## 2019-05-02 LAB
TREPONEMA PALLIDUM IGG+IGM AB [PRESENCE] IN SERUM OR PLASMA BY IMMUNOASSAY: NON REACTIVE
TSH SERPL DL<=0.005 MIU/L-ACNC: 3.62 UIU/ML (ref 0.38–5.33)

## 2019-07-30 ENCOUNTER — OFFICE VISIT (OUTPATIENT)
Dept: MEDICAL GROUP | Facility: MEDICAL CENTER | Age: 44
End: 2019-07-30
Payer: COMMERCIAL

## 2019-07-30 VITALS
OXYGEN SATURATION: 95 % | SYSTOLIC BLOOD PRESSURE: 120 MMHG | TEMPERATURE: 97.7 F | DIASTOLIC BLOOD PRESSURE: 70 MMHG | BODY MASS INDEX: 35.42 KG/M2 | HEART RATE: 76 BPM | RESPIRATION RATE: 16 BRPM | WEIGHT: 253 LBS | HEIGHT: 71 IN

## 2019-07-30 DIAGNOSIS — F31.9 BIPOLAR DEPRESSION (HCC): ICD-10-CM

## 2019-07-30 DIAGNOSIS — R60.0 LOCALIZED EDEMA: ICD-10-CM

## 2019-07-30 DIAGNOSIS — R21 RASH: ICD-10-CM

## 2019-07-30 DIAGNOSIS — D50.9 IRON DEFICIENCY ANEMIA, UNSPECIFIED IRON DEFICIENCY ANEMIA TYPE: ICD-10-CM

## 2019-07-30 DIAGNOSIS — F41.1 GAD (GENERALIZED ANXIETY DISORDER): ICD-10-CM

## 2019-07-30 DIAGNOSIS — B37.49 YEAST DERMATITIS OF PENIS: ICD-10-CM

## 2019-07-30 PROCEDURE — 99214 OFFICE O/P EST MOD 30 MIN: CPT | Performed by: NURSE PRACTITIONER

## 2019-07-30 RX ORDER — CLOTRIMAZOLE AND BETAMETHASONE DIPROPIONATE 10; .64 MG/G; MG/G
1 CREAM TOPICAL 2 TIMES DAILY
Qty: 1 TUBE | Refills: 0 | Status: SHIPPED | OUTPATIENT
Start: 2019-07-30 | End: 2019-09-13

## 2019-07-30 RX ORDER — FLUCONAZOLE 150 MG/1
150 TABLET ORAL DAILY
Qty: 2 TAB | Refills: 0 | Status: SHIPPED | OUTPATIENT
Start: 2019-07-30 | End: 2019-09-11

## 2019-07-30 RX ORDER — FUROSEMIDE 20 MG/1
40 TABLET ORAL 2 TIMES DAILY
Qty: 12 TAB | Refills: 0 | Status: SHIPPED | OUTPATIENT
Start: 2019-07-30 | End: 2019-08-02

## 2019-07-30 ASSESSMENT — PATIENT HEALTH QUESTIONNAIRE - PHQ9
3. TROUBLE FALLING OR STAYING ASLEEP OR SLEEPING TOO MUCH: NOT AT ALL
4. FEELING TIRED OR HAVING LITTLE ENERGY: NOT AT ALL
9. THOUGHTS THAT YOU WOULD BE BETTER OFF DEAD, OR OF HURTING YOURSELF: NOT AT ALL
SUM OF ALL RESPONSES TO PHQ QUESTIONS 1-9: 0
6. FEELING BAD ABOUT YOURSELF - OR THAT YOU ARE A FAILURE OR HAVE LET YOURSELF OR YOUR FAMILY DOWN: NOT AL ALL
8. MOVING OR SPEAKING SO SLOWLY THAT OTHER PEOPLE COULD HAVE NOTICED. OR THE OPPOSITE, BEING SO FIGETY OR RESTLESS THAT YOU HAVE BEEN MOVING AROUND A LOT MORE THAN USUAL: NOT AT ALL
SUM OF ALL RESPONSES TO PHQ9 QUESTIONS 1 AND 2: 0
7. TROUBLE CONCENTRATING ON THINGS, SUCH AS READING THE NEWSPAPER OR WATCHING TELEVISION: NOT AT ALL
1. LITTLE INTEREST OR PLEASURE IN DOING THINGS: NOT AT ALL
2. FEELING DOWN, DEPRESSED, IRRITABLE, OR HOPELESS: NOT AT ALL
5. POOR APPETITE OR OVEREATING: NOT AT ALL

## 2019-07-30 NOTE — PROGRESS NOTES
"cc: Rash, leg swelling, anxiety      Subjective:     HPI:     Richard Hubbard II is a 44 y.o. male here to discuss the evaluation and management of:    Patient is here today to talk about several issues that have been going on.  He has not been seen in the clinic since October 2018.      Anxiety/bipolar  Patient states that about 2 weeks ago he got back from vacation and he had extreme anxiety, sore throat.  Having reduced appetite.  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  It states that that is since come down however he still continues to have a decent amount of anxiety.  He feels that if it is time to have a referral to psychiatry for his bipolar.    Rash  He also states that he has had a \"rash \"all over his body.  States it is more on his feet and buttock and arms.  It is somewhat dispersed on his body without a specific pattern.  It is approximately 1 mm sized erythematous papules.      Itching/irritation on genitals  He states that he also had what he thought was a fungal infection on his pubic area/penis.  He was having itching.  States he is tried putting some nystatin powder on it.No pain, no discharge.  No dysuria.  He is been also trying to put cornstarch on it.    Leg swelling  Patient states that he is been having worsening leg swelling.  States that his legs are \"just blown up\" he also states that he had some scrotal swelling as well.  No pain, no difficulties with urination.  States when he lays down he does get some relief.    Anemia.   His last hemoglobin was 9.1 and hematocrit 30.7 on his labs from May.  He has not been taking any of his vitamins.  Feels tired.        ROS:  Denies any Headache, Blurred Vision, Confusion, Chest pain,  Shortness of breath,  Abdominal pain, Changes of bowel or bladder, Lower ext edema, Fevers, Nights sweats, Weight Changes, Focal weakness or numbness.  And all other systems are negative.  Rash, anxiety, " knee pain, weight loss, leg swelling        Current Outpatient Medications:   •  fluconazole (DIFLUCAN) 150 MG tablet, Take 1 Tab by mouth every day., Disp: 2 Tab, Rfl: 0  •  clotrimazole-betamethasone (LOTRISONE) 1-0.05 % Cream, Apply 1 g to affected area(s) 2 times a day., Disp: 1 Tube, Rfl: 0  •  losartan (COZAAR) 25 MG Tab, Take 1 Tab by mouth every day for 360 days., Disp: 90 Tab, Rfl: 3  •  Adalimumab (HUMIRA PEN SC), Inject  as instructed., Disp: , Rfl:   •  Acetaminophen (TYLENOL PO), Take  by mouth., Disp: , Rfl:   •  furosemide (LASIX) 20 MG Tab, Take 1 Tab by mouth 2 times a day., Disp: 60 Tab, Rfl: 6  •  omeprazole (PRILOSEC) 20 MG delayed-release capsule, Take 20 mg by mouth every day., Disp: , Rfl:   •  ferrous sulfate 325 (65 Fe) MG tablet, Take 1 Tab by mouth 2 Times a Day., Disp: , Rfl:     Allergies   Allergen Reactions   • Nsaids    • Penicillins    • Phenergan [Promethazine Hcl]        Past Medical History:   Diagnosis Date   • ADD (attention deficit disorder)    • Alcohol abuse    • Allergic rhinitis 5/21/2009   • Anxiety 5/21/2009   • Back pain 5/21/2009   • Bipolar disorder (HCC)    • Bleeding ulcer 5/21/2009   • Depression 5/21/2009   • Eczema 5/21/2009   • History of bleeding ulcers 2/12/2015   • Insomnia 5/21/2009   • Migraine 5/21/2009   • Obesity, morbid (HCC) 5/21/2009   • Rheumatoid arteritis    • Sleep apnea 5/21/2009     Past Surgical History:   Procedure Laterality Date   • GASTRIC BYPASS LAPAROSCOPIC  2000    Lucila en y     Family History   Problem Relation Age of Onset   • Hypertension Mother    • Arthritis Mother    • Depression Mother    • Cancer Father         bladder   • Schizophrenia Father    • Cancer Maternal Grandmother         breast   • Heart Disease Maternal Grandmother    • Psychiatric Illness Other    • Stroke Maternal Aunt    • Other Neg Hx         no connective tissue disorders or known aortic disease     Social History     Socioeconomic History   • Marital status:  "     Spouse name: Not on file   • Number of children: Not on file   • Years of education: Not on file   • Highest education level: Not on file   Occupational History   • 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   Social Needs   • Financial resource strain: Not on file   • Food insecurity:     Worry: Not on file     Inability: Not on file   • Transportation needs:     Medical: Not on file     Non-medical: Not on file   Tobacco Use   • Smoking status: Former Smoker     Packs/day: 0.25     Years: 3.00     Pack years: 0.75     Types: Cigarettes, Cigars     Last attempt to quit: 2001     Years since quittin.6   • Smokeless tobacco: Never Used   • Tobacco comment: occasional cigar   Substance and Sexual Activity   • Alcohol use: No     Alcohol/week: 0.0 oz     Comment: quit 2.5 yrs ago   • Drug use: No   • Sexual activity: Yes     Partners: Female     Comment: ;    Lifestyle   • Physical activity:     Days per week: Not on file     Minutes per session: Not on file   • Stress: Not on file   Relationships   • Social connections:     Talks on phone: Not on file     Gets together: Not on file     Attends Mandaen service: Not on file     Active member of club or organization: Not on file     Attends meetings of clubs or organizations: Not on file     Relationship status: Not on file   • Intimate partner violence:     Fear of current or ex partner: Not on file     Emotionally abused: Not on file     Physically abused: Not on file     Forced sexual activity: Not on file   Other Topics Concern   • Not on file   Social History Narrative    , lives in [place]       Objective:     Vitals: /70   Pulse 76   Temp 36.5 °C (97.7 °F)   Resp 16   Ht 1.803 m (5' 11\")   Wt 114.8 kg (253 lb)   SpO2 95%   BMI 35.29 kg/m²    General: Alert, pleasant, anxious, appears overwhelmed  HEENT: Normocephalic.    Heart: Regular rate and rhythm.  +Murmur   Respiratory: Normal respiratory effort.  Clear to auscultation " bilaterally. No wheezing  Skin: Warm, dry, generalized fine papular rash on arms back legs no specific pattern.  Extremities bilateral lower extremity edema.  Neurological: No tremors  Psych:  Affect/mood is anxious, slightly disorganized judgement is good, memory is intact, grooming is appropriate.    Assessment/Plan:      was seen today for rash and leg swelling.    Diagnoses and all orders for this visit:    Localized edema  -     furosemide (LASIX) 20 MG Tab; Take 2 Tabs by mouth 2 times a day for 3 days.    Rash  Benign-appearing, appears to be slightly resolving.  He is on Humira.  Recommend follow-up with rheumatology.    Bipolar depression (HCC)  Not well controlled.  Denies any suicidal thoughts or ideations.  Referral placed to psychiatry.  Patient states he is ready to possibly start treatment.  -     REFERRAL TO PSYCHIATRY    ELISEO (generalized anxiety disorder)  -     REFERRAL TO PSYCHIATRY    Iron deficiency anemia, unspecified iron deficiency anemia type  Recommend continue taking iron supplementation.    Yeast dermatitis of penis  -     fluconazole (DIFLUCAN) 150 MG tablet; Take 1 Tab by mouth every day.  -     clotrimazole-betamethasone (LOTRISONE) 1-0.05 % Cream; Apply 1 g to affected area(s) 2 times a day.      Return in about 4 weeks (around 8/27/2019).          Brit RESENDIZ

## 2019-08-02 ENCOUNTER — HOSPITAL ENCOUNTER (EMERGENCY)
Facility: MEDICAL CENTER | Age: 44
End: 2019-08-02
Attending: EMERGENCY MEDICINE
Payer: COMMERCIAL

## 2019-08-02 ENCOUNTER — APPOINTMENT (OUTPATIENT)
Dept: RADIOLOGY | Facility: MEDICAL CENTER | Age: 44
End: 2019-08-02
Attending: EMERGENCY MEDICINE
Payer: COMMERCIAL

## 2019-08-02 VITALS
RESPIRATION RATE: 18 BRPM | SYSTOLIC BLOOD PRESSURE: 113 MMHG | TEMPERATURE: 98.6 F | HEIGHT: 71 IN | DIASTOLIC BLOOD PRESSURE: 72 MMHG | BODY MASS INDEX: 33.46 KG/M2 | WEIGHT: 238.98 LBS | OXYGEN SATURATION: 97 % | HEART RATE: 63 BPM

## 2019-08-02 DIAGNOSIS — T88.7XXA MEDICATION SIDE EFFECT: ICD-10-CM

## 2019-08-02 DIAGNOSIS — K92.0 HEMATEMESIS WITH NAUSEA: ICD-10-CM

## 2019-08-02 DIAGNOSIS — M06.9 RHEUMATOID ARTHRITIS, INVOLVING UNSPECIFIED SITE, UNSPECIFIED RHEUMATOID FACTOR PRESENCE: ICD-10-CM

## 2019-08-02 LAB
ABO GROUP BLD: NORMAL
ALBUMIN SERPL BCP-MCNC: 2.5 G/DL (ref 3.2–4.9)
ALBUMIN/GLOB SERPL: 1 G/DL
ALP SERPL-CCNC: 114 U/L (ref 30–99)
ALT SERPL-CCNC: 19 U/L (ref 2–50)
ANION GAP SERPL CALC-SCNC: 7 MMOL/L (ref 0–11.9)
APTT PPP: 23.7 SEC (ref 24.7–36)
AST SERPL-CCNC: 17 U/L (ref 12–45)
BASOPHILS # BLD AUTO: 0.4 % (ref 0–1.8)
BASOPHILS # BLD: 0.04 K/UL (ref 0–0.12)
BILIRUB SERPL-MCNC: 0.4 MG/DL (ref 0.1–1.5)
BLD GP AB SCN SERPL QL: NORMAL
BUN SERPL-MCNC: 18 MG/DL (ref 8–22)
CALCIUM SERPL-MCNC: 7.5 MG/DL (ref 8.5–10.5)
CHLORIDE SERPL-SCNC: 102 MMOL/L (ref 96–112)
CO2 SERPL-SCNC: 29 MMOL/L (ref 20–33)
CREAT SERPL-MCNC: 1.08 MG/DL (ref 0.5–1.4)
EOSINOPHIL # BLD AUTO: 0.09 K/UL (ref 0–0.51)
EOSINOPHIL NFR BLD: 0.8 % (ref 0–6.9)
ERYTHROCYTE [DISTWIDTH] IN BLOOD BY AUTOMATED COUNT: 58.2 FL (ref 35.9–50)
GLOBULIN SER CALC-MCNC: 2.6 G/DL (ref 1.9–3.5)
GLUCOSE SERPL-MCNC: 86 MG/DL (ref 65–99)
HCT VFR BLD AUTO: 29.8 % (ref 42–52)
HGB BLD-MCNC: 9.1 G/DL (ref 14–18)
IMM GRANULOCYTES # BLD AUTO: 0.05 K/UL (ref 0–0.11)
IMM GRANULOCYTES NFR BLD AUTO: 0.5 % (ref 0–0.9)
INR PPP: 0.93 (ref 0.87–1.13)
LIPASE SERPL-CCNC: 27 U/L (ref 11–82)
LYMPHOCYTES # BLD AUTO: 3.61 K/UL (ref 1–4.8)
LYMPHOCYTES NFR BLD: 32.6 % (ref 22–41)
MCH RBC QN AUTO: 25.6 PG (ref 27–33)
MCHC RBC AUTO-ENTMCNC: 30.5 G/DL (ref 33.7–35.3)
MCV RBC AUTO: 83.9 FL (ref 81.4–97.8)
MONOCYTES # BLD AUTO: 0.9 K/UL (ref 0–0.85)
MONOCYTES NFR BLD AUTO: 8.1 % (ref 0–13.4)
NEUTROPHILS # BLD AUTO: 6.39 K/UL (ref 1.82–7.42)
NEUTROPHILS NFR BLD: 57.6 % (ref 44–72)
NRBC # BLD AUTO: 0 K/UL
NRBC BLD-RTO: 0 /100 WBC
PLATELET # BLD AUTO: 318 K/UL (ref 164–446)
PMV BLD AUTO: 8.4 FL (ref 9–12.9)
POTASSIUM SERPL-SCNC: 4.3 MMOL/L (ref 3.6–5.5)
PROT SERPL-MCNC: 5.1 G/DL (ref 6–8.2)
PROTHROMBIN TIME: 12.6 SEC (ref 12–14.6)
RBC # BLD AUTO: 3.55 M/UL (ref 4.7–6.1)
RH BLD: NORMAL
SODIUM SERPL-SCNC: 138 MMOL/L (ref 135–145)
WBC # BLD AUTO: 11.1 K/UL (ref 4.8–10.8)

## 2019-08-02 PROCEDURE — C9113 INJ PANTOPRAZOLE SODIUM, VIA: HCPCS | Performed by: EMERGENCY MEDICINE

## 2019-08-02 PROCEDURE — 700111 HCHG RX REV CODE 636 W/ 250 OVERRIDE (IP): Performed by: EMERGENCY MEDICINE

## 2019-08-02 PROCEDURE — 700102 HCHG RX REV CODE 250 W/ 637 OVERRIDE(OP): Performed by: INTERNAL MEDICINE

## 2019-08-02 PROCEDURE — 86900 BLOOD TYPING SEROLOGIC ABO: CPT

## 2019-08-02 PROCEDURE — 86850 RBC ANTIBODY SCREEN: CPT

## 2019-08-02 PROCEDURE — 83690 ASSAY OF LIPASE: CPT

## 2019-08-02 PROCEDURE — 80053 COMPREHEN METABOLIC PANEL: CPT

## 2019-08-02 PROCEDURE — 71045 X-RAY EXAM CHEST 1 VIEW: CPT

## 2019-08-02 PROCEDURE — 86901 BLOOD TYPING SEROLOGIC RH(D): CPT

## 2019-08-02 PROCEDURE — A9270 NON-COVERED ITEM OR SERVICE: HCPCS | Performed by: INTERNAL MEDICINE

## 2019-08-02 PROCEDURE — 85610 PROTHROMBIN TIME: CPT

## 2019-08-02 PROCEDURE — 85025 COMPLETE CBC W/AUTO DIFF WBC: CPT

## 2019-08-02 PROCEDURE — 96374 THER/PROPH/DIAG INJ IV PUSH: CPT

## 2019-08-02 PROCEDURE — 85730 THROMBOPLASTIN TIME PARTIAL: CPT

## 2019-08-02 PROCEDURE — 99285 EMERGENCY DEPT VISIT HI MDM: CPT

## 2019-08-02 PROCEDURE — 36415 COLL VENOUS BLD VENIPUNCTURE: CPT

## 2019-08-02 RX ORDER — PANTOPRAZOLE SODIUM 40 MG/10ML
80 INJECTION, POWDER, LYOPHILIZED, FOR SOLUTION INTRAVENOUS ONCE
Status: COMPLETED | OUTPATIENT
Start: 2019-08-02 | End: 2019-08-02

## 2019-08-02 RX ORDER — ESOMEPRAZOLE MAGNESIUM 40 MG/1
40 GRANULE, DELAYED RELEASE ORAL
Qty: 60 EACH | Refills: 0 | Status: SHIPPED | OUTPATIENT
Start: 2019-08-02 | End: 2019-08-15

## 2019-08-02 RX ADMIN — LIDOCAINE HYDROCHLORIDE 15 ML: 20 SOLUTION OROPHARYNGEAL at 11:44

## 2019-08-02 RX ADMIN — PANTOPRAZOLE SODIUM 80 MG: 40 INJECTION, POWDER, LYOPHILIZED, FOR SOLUTION INTRAVENOUS at 11:45

## 2019-08-02 ASSESSMENT — LIFESTYLE VARIABLES
EVER HAD A DRINK FIRST THING IN THE MORNING TO STEADY YOUR NERVES TO GET RID OF A HANGOVER: NO
DO YOU DRINK ALCOHOL: YES
HAVE PEOPLE ANNOYED YOU BY CRITICIZING YOUR DRINKING: NO
TOTAL SCORE: 0
TOTAL SCORE: 0
HAVE YOU EVER FELT YOU SHOULD CUT DOWN ON YOUR DRINKING: NO
EVER FELT BAD OR GUILTY ABOUT YOUR DRINKING: NO
DOES PATIENT WANT TO STOP DRINKING: NO
CONSUMPTION TOTAL: INCOMPLETE
TOTAL SCORE: 0

## 2019-08-02 NOTE — ED NOTES
Pt given dc instructions/ no blood in vomit noted while in ER , Pt instructed to follow up with GI. Verbalizes understanding

## 2019-08-02 NOTE — ED TRIAGE NOTES
Chief Complaint   Patient presents with   • Vomiting Blood     Had 2 episodes of vomiting bright red blood this morning.  History of bleeding ulcers.  Using NSAIDS due to recent knee pain.  Triage process explained to patient.  Pt back to waiting room.  Pt instructed to inform RN if any changes or questions arise.

## 2019-08-02 NOTE — CONSULTS
DATE OF SERVICE:  08/02/2019    GASTROENTEROLOGY CONSULTATION    REFERRING PHYSICIAN:  Jaron Bailey MD    REASON FOR CONSULTATION:  GI bleeding.    HISTORY OF PRESENT ILLNESS:  The patient is 44-year-old gentleman with a   history of Lucila-en-Y for weight loss over 15 years ago, and history of   complicated peptic ulcer disease years ago, treated in [place].  The patient has   also history of rheumatoid arthritis, and has been using anti-inflammatory   medications, ibuprofen up to 400-600 mg daily now for the past several weeks   to months.  He also uses Nexium -40 mg, though does not use this necessarily   on a daily basis, only when he feels as if he has heartburn type symptoms.    Earlier today, the patient had abrupt onset of feeling of nausea, and vomited   up a very small amount of fairly fresh appearing blood.  He also reports some   increased burping and heartburn.  His last bowel movement was earlier today,   which was brown and normal.  His laboratories demonstrate some anemia with a   hemoglobin of 9.1; however, this is his baseline values compared with previous   values from a month ago.  His BUN is 18 with a creatinine of 1.08.  Liver   tests were normal.  Patient's INR is 0.93.    PAST MEDICAL HISTORY:  Significant for obesity, history of rheumatoid   arthritis, history of ADD, history of anxiety, history of migraine headaches,   history of sleep apnea, history of peptic ulcer disease.    PAST SURGICAL HISTORY:  Includes Lucila-en-Y back in 2000.    MEDICATIONS:  At home include Lasix, Diflucan, Cozaar, ibuprofen, Nexium,   methotrexate.    ALLERGIES:  TO PENICILLINS AND PHENERGAN.    SOCIAL HISTORY:  The patient is a previous smoker, total of 3 pack year   history.  Patient does not use alcohol.    FAMILY HISTORY:  Negative for any GI disease.    REVIEW OF SYSTEMS:  Significant only for the recent hematemesis, burping and   heartburn.  He denies any lightheadedness, chest pain, shortness of breath,    fevers, chills, abdominal pains, dysuria or hematuria.  Denies any blood in   stools.  Denies any constipation or diarrhea.  He does have longstanding lower   extremity edema bilaterally.    LABORATORY DATA:  Hemoglobin 9.1, platelet count 318,000.  INR is 1.    Chemistry panel is normal.    ASSESSMENT AND PLAN:  1.  Hematemesis.  2.  Chronic anemia, stable.  3.  History of peptic ulcer disease, remote past.  4.  History of Lucila-en-Y for obesity,  5.  History of obesity.  6.  History of rheumatoid arthritis.  7.  History of obstructive sleep apnea.  8.  History of depression.  9.  History of anxiety.    DISCUSSION:  The patient is a 44-year-old gentleman who appears to have had   some minor upper GI bleeding.  This is associated with his recent use of   ibuprofen for his rheumatoid arthritis, and right knee pain.  It is reassuring   that the patient's hemoglobin has been unchanged, from his previous baseline.    His vital signs have been stable in the emergency room, and he has not been   having any signs of melena.  Additionally, he has not had azotemia, suggesting   that his bleeding was relatively minor.  I think he would be alright for the   patient to be discharged home from the emergency room, with increasing his   Nexium to b.i.d.  He also is very agreeable to not taking any NSAIDs, and   tells me he is able to get by without them for the time being.  We will get   him scheduled in the outpatient setting for an elective upper endoscopy, as he   does not require inpatient stay or workup.    Thank you for allowing me to participate in the care of this patient.       ____________________________________     EFREN VALIENTE MD    WP / NTS    DD:  08/02/2019 11:56:35  DT:  08/02/2019 13:19:16    D#:  [id number]  Job#:  [id number]

## 2019-08-02 NOTE — ED PROVIDER NOTES
ED Provider Note    Scribed for Jaron Bailey M.D. by Austen Cervantes. 2019  10:26 AM    Primary care provider: MARTÍN Turner  Means of arrival: walk in  History obtained from: patient  History limited by: none    CHIEF COMPLAINT  Chief Complaint   Patient presents with   • Vomiting Blood       HPI  Richard Hubbard II is a 44 y.o. male who presents to the Emergency Department complaining of sudden hematemesis starting this morning. Patient reports a history of peptic ulcer disease which was repaired with cauterization previously. He had blood work performed in May that showed normal hemoglobin. Patient reports that he has had swelling in his knee recently and has been taking NSAIDs for pain control. This morning, the patient reports that he vomited approximately 1 ounce of bright red blood, prompting him to come to the ED for evaluation. Patient denies melena, abdominal pain.      REVIEW OF SYSTEMS  Pertinent positives include hematemesis.   Pertinent negatives include no melena, abdominal pain..    All other systems reviewed and negative. See HPI for further details.     PAST MEDICAL HISTORY   has a past medical history of ADD (attention deficit disorder), Alcohol abuse, Allergic rhinitis (2009), Anxiety (2009), Back pain (2009), Bipolar disorder (HCC), Bleeding ulcer (2009), Depression (2009), Eczema (2009), History of bleeding ulcers (2015), Insomnia (2009), Migraine (2009), Obesity, morbid (HCC) (2009), Rheumatoid arteritis, and Sleep apnea (2009).    SURGICAL HISTORY   has a past surgical history that includes gastric bypass laparoscopic ().    SOCIAL HISTORY  Social History     Tobacco Use   • Smoking status: Former Smoker     Packs/day: 0.25     Years: 3.00     Pack years: 0.75     Types: Cigarettes, Cigars     Last attempt to quit: 2001     Years since quittin.5   • Smokeless tobacco: Never Used   • Tobacco  comment: occasional cigar   Substance Use Topics   • Alcohol use: No     Alcohol/week: 0.0 oz     Comment: quit 2.5 yrs ago   • Drug use: No      Social History     Substance and Sexual Activity   Drug Use No       FAMILY HISTORY  Family History   Problem Relation Age of Onset   • Hypertension Mother    • Arthritis Mother    • Depression Mother    • Cancer Father         bladder   • Schizophrenia Father    • Cancer Maternal Grandmother         breast   • Heart Disease Maternal Grandmother    • Psychiatric Illness Other    • Stroke Maternal Aunt    • Other Neg Hx         no connective tissue disorders or known aortic disease       CURRENT MEDICATIONS  Current Outpatient Medications:   •  furosemide (LASIX) 20 MG Tab, Take 2 Tabs by mouth 2 times a day for 3 days., Disp: 12 Tab, Rfl: 0  •  fluconazole (DIFLUCAN) 150 MG tablet, Take 1 Tab by mouth every day., Disp: 2 Tab, Rfl: 0  •  clotrimazole-betamethasone (LOTRISONE) 1-0.05 % Cream, Apply 1 g to affected area(s) 2 times a day., Disp: 1 Tube, Rfl: 0  •  losartan (COZAAR) 25 MG Tab, Take 1 Tab by mouth every day for 360 days., Disp: 90 Tab, Rfl: 3  •  ofloxacin (OCUFLOX) 0.3 % Solution, 1-2 DROPS TO BOTH EYES EVERY 2-4 HOURS WHILE AWAKE. USE FOR ONE EXTRA DAY AFTER BETTER., Disp: 5 mL, Rfl: 1  •  albuterol 108 (90 Base) MCG/ACT Aero Soln inhalation aerosol, 2 PUFFS EVERY 4 HOURS ONLY IF NEEDED FOR COUGH, WHEEZING, OR SHORTNESS OF BREATH., Disp: 1 Inhaler, Rfl: 3  •  Adalimumab (HUMIRA PEN SC), Inject  as instructed., Disp: , Rfl:   •  Methotrexate Sodium (METHOTREXATE PO), Take  by mouth., Disp: , Rfl:   •  esomeprazole magnesium (NEXIUM) 40 MG PACK, Take 40 mg by mouth every morning before breakfast., Disp: , Rfl:   •  Acetaminophen (TYLENOL PO), Take  by mouth., Disp: , Rfl:     ALLERGIES  Allergies   Allergen Reactions   • Nsaids    • Penicillins    • Phenergan [Promethazine Hcl]        PHYSICAL EXAM  VITAL SIGNS: /89   Pulse 91   Temp 37 °C (98.6 °F)  "(Temporal)   Resp 18   Ht 1.803 m (5' 11\")   Wt 108.4 kg (238 lb 15.7 oz)   SpO2 99%   BMI 33.33 kg/m²      Nursing note and vitals reviewed.  Constitutional: Well-developed and well-nourished.  HENT: Head is normocephalic and atraumatic. Oropharynx is clear and moist without exudate or erythema.   Eyes: Pupils are equal, round, and reactive to light. Conjunctiva are normal.   Cardiovascular: Normal rate and regular rhythm. No murmur heard. Normal radial pulses.  Pulmonary/Chest: Breath sounds normal. No wheezes or rales.   Abdominal: Soft and non-tender. No distention   Mild tenderness in the epigastrium. Well healed surgical scar.   Musculoskeletal: Extremities exhibit normal range of motion without edema or tenderness.   Neurological: Awake, alert and oriented to person, place, and time. No focal deficits noted.  Skin: Skin is warm and dry. No rash. Pale appearing.   Psychiatric: Normal mood and affect. Appropriate for clinical situation.    DIAGNOSTIC STUDIES / PROCEDURES    LABS  Results for orders placed or performed during the hospital encounter of 08/02/19   CBC WITH DIFFERENTIAL   Result Value Ref Range    WBC 11.1 (H) 4.8 - 10.8 K/uL    RBC 3.55 (L) 4.70 - 6.10 M/uL    Hemoglobin 9.1 (L) 14.0 - 18.0 g/dL    Hematocrit 29.8 (L) 42.0 - 52.0 %    MCV 83.9 81.4 - 97.8 fL    MCH 25.6 (L) 27.0 - 33.0 pg    MCHC 30.5 (L) 33.7 - 35.3 g/dL    RDW 58.2 (H) 35.9 - 50.0 fL    Platelet Count 318 164 - 446 K/uL    MPV 8.4 (L) 9.0 - 12.9 fL    Neutrophils-Polys 57.60 44.00 - 72.00 %    Lymphocytes 32.60 22.00 - 41.00 %    Monocytes 8.10 0.00 - 13.40 %    Eosinophils 0.80 0.00 - 6.90 %    Basophils 0.40 0.00 - 1.80 %    Immature Granulocytes 0.50 0.00 - 0.90 %    Nucleated RBC 0.00 /100 WBC    Neutrophils (Absolute) 6.39 1.82 - 7.42 K/uL    Lymphs (Absolute) 3.61 1.00 - 4.80 K/uL    Monos (Absolute) 0.90 (H) 0.00 - 0.85 K/uL    Eos (Absolute) 0.09 0.00 - 0.51 K/uL    Baso (Absolute) 0.04 0.00 - 0.12 K/uL    Immature " Granulocytes (abs) 0.05 0.00 - 0.11 K/uL    NRBC (Absolute) 0.00 K/uL   All labs reviewed by me.    RADIOLOGY  DX-CHEST-PORTABLE (1 VIEW)    (Results Pending)   The radiologist's interpretation of all radiological studies have been reviewed by me.    COURSE & MEDICAL DECISION MAKING  Nursing notes, VS, PMSFHx reviewed in chart.     Review of past medical records shows the patient has a history of peptic ulcer disease.      10:26 AM - Patient seen and examined at bedside. Discussed his condition and evaluation in the ED. I will consult with GI and the patient will be likely admitted for more definitive evaluation and treatment. Patient verbalizes understanding and agreement to this plan of care. Patient will be treated with Protonix 80 mg. Ordered DX chest, COD, CBC with differential, CMP, Lipase, PT/INR, APTT to evaluate his symptoms. The differential diagnoses include but are not limited to: peptic ulcer     11:01 AM - Paged GI.     11:04 AM  - I discussed the patient's case and the above findings with Dr. Mast (GI) who agrees to consult.     11:31 AM Patient was seen in the emergency department by Dr. Malhotra.  He would like to increase his Nexium to twice daily.  He told the patient to discontinue NSAID use.  Patient will do so.  Agrees to discontinue NSAIDs.  He will have the patient follow-up in clinic for upper endoscopy.  Patient's hemoglobin is stable.  BUN is not elevated.    The patient will return for new or worsening symptoms and is stable at the time of discharge.    The patient is referred to a primary physician for blood pressure management, diabetic screening, and for all other preventative health concerns.    DISPOSITION:  Patient will be discharged home in stable condition.    FOLLOW UP:  [facility], Emergency Dept  [address] [id number] [phone]    If symptoms worsen    Umair Malhotra M.D.  [place] [id number]  [phone]    Schedule an  appointment as soon as possible for a visit   GI specialist      OUTPATIENT MEDICATIONS:  Current Discharge Medication List            FINAL IMPRESSION  1. Hematemesis with nausea    2. Rheumatoid arthritis, involving unspecified site, unspecified rheumatoid factor presence (HCC)    3. Medication side effect          IAusten (Scribe), am scribing for, and in the presence of, Jaron Bailey M.D..    Electronically signed by: Austen Cervantes (Scribe), 8/2/2019    IJaron M.D. personally performed the services described in this documentation, as scribed by Austen Cervantes in my presence, and it is both accurate and complete. C    The note accurately reflects work and decisions made by me.  Jaron Bailey  8/2/2019  11:31 AM

## 2019-08-08 ENCOUNTER — HOSPITAL ENCOUNTER (OUTPATIENT)
Facility: MEDICAL CENTER | Age: 44
End: 2019-08-08
Attending: ORTHOPAEDIC SURGERY | Admitting: ORTHOPAEDIC SURGERY
Payer: COMMERCIAL

## 2019-08-15 ENCOUNTER — OFFICE VISIT (OUTPATIENT)
Dept: VASCULAR LAB | Facility: MEDICAL CENTER | Age: 44
End: 2019-08-15
Attending: FAMILY MEDICINE
Payer: COMMERCIAL

## 2019-08-15 VITALS
SYSTOLIC BLOOD PRESSURE: 108 MMHG | HEART RATE: 69 BPM | WEIGHT: 234.3 LBS | DIASTOLIC BLOOD PRESSURE: 61 MMHG | BODY MASS INDEX: 32.8 KG/M2 | HEIGHT: 71 IN

## 2019-08-15 DIAGNOSIS — I35.0 AORTIC VALVE STENOSIS, ETIOLOGY OF CARDIAC VALVE DISEASE UNSPECIFIED: ICD-10-CM

## 2019-08-15 DIAGNOSIS — G47.33 OSA (OBSTRUCTIVE SLEEP APNEA): ICD-10-CM

## 2019-08-15 DIAGNOSIS — M06.9 RHEUMATOID ARTHRITIS INVOLVING MULTIPLE SITES, UNSPECIFIED RHEUMATOID FACTOR PRESENCE: ICD-10-CM

## 2019-08-15 DIAGNOSIS — D50.9 IRON DEFICIENCY ANEMIA, UNSPECIFIED IRON DEFICIENCY ANEMIA TYPE: ICD-10-CM

## 2019-08-15 DIAGNOSIS — I77.810 AORTIC ROOT DILATION (HCC): ICD-10-CM

## 2019-08-15 DIAGNOSIS — R60.0 LOCALIZED EDEMA: ICD-10-CM

## 2019-08-15 PROCEDURE — 99214 OFFICE O/P EST MOD 30 MIN: CPT | Performed by: FAMILY MEDICINE

## 2019-08-15 PROCEDURE — 99212 OFFICE O/P EST SF 10 MIN: CPT | Performed by: FAMILY MEDICINE

## 2019-08-15 RX ORDER — TORSEMIDE 5 MG/1
5 TABLET ORAL EVERY MORNING
Qty: 30 TAB | Refills: 0 | Status: SHIPPED | OUTPATIENT
Start: 2019-08-15 | End: 2019-08-20

## 2019-08-15 RX ORDER — FERROUS SULFATE 325(65) MG
325 TABLET ORAL DAILY
Status: ON HOLD | COMMUNITY
Start: 2019-08-15 | End: 2019-10-17

## 2019-08-15 RX ORDER — OMEPRAZOLE 20 MG/1
20 CAPSULE, DELAYED RELEASE ORAL 2 TIMES DAILY
Status: ON HOLD | COMMUNITY
End: 2019-09-15 | Stop reason: SDUPTHER

## 2019-08-15 ASSESSMENT — ENCOUNTER SYMPTOMS
ORTHOPNEA: 0
BLOOD IN STOOL: 0
CHILLS: 0
WEAKNESS: 0
NERVOUS/ANXIOUS: 0
FOCAL WEAKNESS: 0
INSOMNIA: 0
DIARRHEA: 0
DEPRESSION: 0
SORE THROAT: 0
VOMITING: 0
MYALGIAS: 0
BRUISES/BLEEDS EASILY: 0
TREMORS: 0
HEMOPTYSIS: 0
BLURRED VISION: 0
DOUBLE VISION: 0
HEADACHES: 0
WHEEZING: 0
SHORTNESS OF BREATH: 0
NAUSEA: 0
DIZZINESS: 0
PALPITATIONS: 0
FEVER: 0
COUGH: 0
ABDOMINAL PAIN: 0
SEIZURES: 0

## 2019-08-15 NOTE — PROGRESS NOTES
FOLLOW-UP VASCULAR MED VISIT  Subjective:   Richard Hubbard is a 44 y.o. male who presents today 8/15/2019 for   Chief Complaint   Patient presents with   • Follow-Up     Aortic root dilatation   intially referred by PCP for eval for vascular disease assessment in context of Ao stenosis.     HPI:    Ao root dilatation with moderate Ao stenosis:  No current issues noted except for edema.   Pertinent pmhx:   Had echo 12/2018 due to worsening murmur per rheumatologist.  Had had murmur for many years since his teens.  Reports no hx of recurrent strep or rheum fever.  No hx of syphlis or connective tissue disorders.  No fhx of Aortic disease.  Also noted to have mild Ao dilatation = 3.9cm.  Notes intermittent palpitations, no CP, SOB, dizz or syncope.  No postural syncopal episodes.     RA:    Worsening arthritis, pending surgery 9/16 of right knee  Using humira, no MTX.  Pending with Dr. Conway (rheum)    Edema:   Reports new rashes and worsening swelling.  Had increased BLE swelling and scrotal swelling.   Has arthritis flare-up, finished steroids.   Only humira, pending with Dr. Conway.   Swelling better with lasix from PCP.  Noted to have low albumin.  No prior CHF on echo.  No recent BNP.  Kidney function and liver function stable except low alb and prot    Anemia:   Chronic, taking iron.  Has been stable.  Pending upper endoscopy soon.   No other labs or fecal testing to date.  Believes related to gastric surgery and absorption.  No prior infusions of iron.     HTN:    No prior hx of HTN or hx of use of antiHTN meds.     MONICA:    Supposed on to be CPAP, machine needs to be titrated.  Has not seen sleep MD for 2 years.   Reports snoring and lack of restful sleep.      Antiplatelet/anticoagulation: No    Hyperlipidemia: No, last lipid panel 9/2018 with normal findings.     Clinical evidence of ASCVD:     1) hx of MI/ACS:  No   2) coronary or other revascularization procedure: No   3) TIA/ischemic CVA: No   4) PAD  (including SUSHMA <0.9): No   5) documented (CAD, Renal athero, AA due to athero, carotid plaque >50% stenosis: No    Major RFs:     1) Age (Men>44, women>54):  no   2) Fhx early CAD (<55 men, <65 women):  no   3) cigarette smoking:  No   4) high BP >139/89 or on tx: no   5) Low HDL-C (<40 men, <50 women):  no    Other ASCVD risk factors:     T1/T2D: No   CKD:  No   Sleeping disorder/MONICA: No   Hypothyroidism: No    Social History     Tobacco Use   • Smoking status: Former Smoker     Packs/day: 0.25     Years: 3.00     Pack years: 0.75     Types: Cigarettes, Cigars     Last attempt to quit: 2001     Years since quittin.6   • Smokeless tobacco: Never Used   • Tobacco comment: occasional cigar   Substance Use Topics   • Alcohol use: No     Alcohol/week: 0.0 oz     Comment: quit 2.5 yrs ago   • Drug use: No     Outpatient Encounter Medications as of 8/15/2019   Medication Sig Dispense Refill   • omeprazole (PRILOSEC) 20 MG delayed-release capsule Take 20 mg by mouth every day.     • torsemide (DEMADEX) 5 MG Tab Take 1 Tab by mouth every morning for 30 days. 30 Tab 0   • ferrous sulfate 325 (65 Fe) MG tablet Take 1 Tab by mouth 2 Times a Day.     • fluconazole (DIFLUCAN) 150 MG tablet Take 1 Tab by mouth every day. 2 Tab 0   • clotrimazole-betamethasone (LOTRISONE) 1-0.05 % Cream Apply 1 g to affected area(s) 2 times a day. 1 Tube 0   • losartan (COZAAR) 25 MG Tab Take 1 Tab by mouth every day for 360 days. 90 Tab 3   • Adalimumab (HUMIRA PEN SC) Inject  as instructed.     • Acetaminophen (TYLENOL PO) Take  by mouth.     • [DISCONTINUED] esomeprazole magnesium (NEXIUM) 40 MG Pack Take 40 mg by mouth 2 times daily, before breakfast and dinner. (Patient not taking: Reported on 8/15/2019) 60 Each 0   • [DISCONTINUED] ofloxacin (OCUFLOX) 0.3 % Solution 1-2 DROPS TO BOTH EYES EVERY 2-4 HOURS WHILE AWAKE. USE FOR ONE EXTRA DAY AFTER BETTER. (Patient not taking: Reported on 8/15/2019) 5 mL 1   • [DISCONTINUED] albuterol  "108 (90 Base) MCG/ACT Aero Soln inhalation aerosol 2 PUFFS EVERY 4 HOURS ONLY IF NEEDED FOR COUGH, WHEEZING, OR SHORTNESS OF BREATH. (Patient not taking: Reported on 8/15/2019) 1 Inhaler 3   • [DISCONTINUED] Methotrexate Sodium (METHOTREXATE PO) Take  by mouth.       No facility-administered encounter medications on file as of 8/15/2019.      Allergies   Allergen Reactions   • Nsaids    • Penicillins    • Phenergan [Promethazine Hcl]      DIET AND EXERCISE:  Weight Change: lost 100lb over 3 years   BMI Readings from Last 4 Encounters:   08/15/19 32.68 kg/m²   08/02/19 33.33 kg/m²   07/30/19 35.29 kg/m²   03/08/19 33.52 kg/m²      Diet: common adult with decrease portion sizes   Exercise: moderate regular exercise program     Review of Systems   Constitutional: Negative for chills, fever and malaise/fatigue.   HENT: Negative for nosebleeds, sore throat and tinnitus.    Eyes: Negative for blurred vision and double vision.   Respiratory: Negative for cough, hemoptysis, shortness of breath and wheezing.    Cardiovascular: Negative for chest pain, palpitations, orthopnea and leg swelling.   Gastrointestinal: Negative for abdominal pain, blood in stool, diarrhea, melena, nausea and vomiting.   Genitourinary: Negative for hematuria.   Musculoskeletal: Negative for joint pain and myalgias.   Skin: Negative for itching and rash.   Neurological: Negative for dizziness, tremors, focal weakness, seizures, weakness and headaches.   Endo/Heme/Allergies: Does not bruise/bleed easily.   Psychiatric/Behavioral: Negative for depression. The patient is not nervous/anxious and does not have insomnia.       Objective:     Vitals:    08/15/19 1034   BP: 108/61   BP Location: Left arm   Patient Position: Sitting   BP Cuff Size: Adult   Pulse: 69   Weight: 106.3 kg (234 lb 4.8 oz)   Height: 1.803 m (5' 11\")      BP Readings from Last 4 Encounters:   08/15/19 108/61   08/02/19 113/72   07/30/19 120/70   03/08/19 116/68      Body mass index " is 32.68 kg/m².     Physical Exam   Constitutional: He is oriented to person, place, and time. He appears well-developed and well-nourished. He is cooperative. No distress.   HENT:   Head: Normocephalic and atraumatic.   Mouth/Throat: Oropharynx is clear and moist and mucous membranes are normal.   Eyes: Pupils are equal, round, and reactive to light. Conjunctivae are normal.   Neck: Trachea normal and normal range of motion. Neck supple. Normal carotid pulses and no JVD present. Carotid bruit is not present. No thyromegaly present.   Cardiovascular: Normal rate, regular rhythm and intact distal pulses. PMI is not displaced. Exam reveals no gallop and no friction rub.   Murmur heard.   Crescendo decrescendo systolic (harsh) murmur is present with a grade of 3/6.   No diastolic murmur is present.  Pulses:       Carotid pulses are 2+ on the right side, and 2+ on the left side.       Radial pulses are 2+ on the right side, and 2+ on the left side.        Dorsalis pedis pulses are 2+ on the right side, and 2+ on the left side.        Posterior tibial pulses are 2+ on the right side, and 2+ on the left side.   Edema:    RLE: none     LLE: none   Spider telangectasia:       RLE:  None      LLE: none   Varicosities:         RLE: none      LLE: none   Corona phlebectatica:      RLE:  None        LLE:  None   Cording:         RLE:  None     LLE: None    Pulmonary/Chest: Effort normal and breath sounds normal. No respiratory distress.   Abdominal: Soft. Bowel sounds are normal. He exhibits no distension and no mass. There is no hepatosplenomegaly. There is no tenderness. There is no rebound and no guarding.   Musculoskeletal: He exhibits no edema.   Lymphadenopathy:     He has no cervical adenopathy.   Neurological: He is alert and oriented to person, place, and time. No cranial nerve deficit. Coordination and gait normal.   Skin: Skin is warm and dry. He is not diaphoretic. No cyanosis. No pallor. Nails show no clubbing.    Psychiatric: He has a normal mood and affect.     Lab Results   Component Value Date    CHOLSTRLTOT 114 05/01/2019    LDL 37 05/01/2019    HDL 64 05/01/2019    TRIGLYCERIDE 64 05/01/2019      Lab Results   Component Value Date    PROTHROMBTM 12.6 08/02/2019    INR 0.93 08/02/2019         Lab Results   Component Value Date    SODIUM 138 08/02/2019    POTASSIUM 4.3 08/02/2019    CHLORIDE 102 08/02/2019    CO2 29 08/02/2019    GLUCOSE 86 08/02/2019    BUN 18 08/02/2019    CREATININE 1.08 08/02/2019    IFAFRICA >60 08/02/2019    IFNOTAFR >60 08/02/2019        Lab Results   Component Value Date    WBC 11.1 (H) 08/02/2019    RBC 3.55 (L) 08/02/2019    HEMOGLOBIN 9.1 (L) 08/02/2019    HEMATOCRIT 29.8 (L) 08/02/2019    MCV 83.9 08/02/2019    MCH 25.6 (L) 08/02/2019    MCHC 30.5 (L) 08/02/2019    MPV 8.4 (L) 08/02/2019      VASCULAR IMAGING:     Echo 12/21/18  No prior study is available for comparison.   Normal left ventricular systolic function.  Left ventricular ejection fraction is visually estimated to be 60%.  Normal diastolic function.  Mildly dilated right ventricle. Normal right ventricular systolic   function.  Normal inferior vena cava size without inspiratory collapse.  Aortic valve is not well visualized. probably fused right and left   cusps. Moderate aortic stenosis. Transvalvular gradients are - Peak: 54   mmHg, Mean: 38 mmHg.   Estimated right ventricular systolic pressure  is 40 mmHg.  Dilated aortic root measuring 3.9 cm    Medical Decision Making:  Today's Assessment / Status / Plan:     1. Aortic root dilation (HCC)  EC-ECHOCARDIOGRAM COMPLETE W/O CONT   2. Aortic valve stenosis, etiology of cardiac valve disease unspecified  EC-ECHOCARDIOGRAM COMPLETE W/O CONT   3. MONICA (obstructive sleep apnea)     4. Rheumatoid arthritis involving multiple sites, unspecified rheumatoid factor presence (HCC)     5. Localized edema  CORTISOL    proBrain Natriuretic Peptide, NT   6. Iron deficiency anemia, unspecified  iron deficiency anemia type  IRON/TOTAL IRON BIND (FEIBC)    FERRITIN    OCCULT BLOOD FECES IMMUNOASSAY    RETICULOCYTES COUNT    CBC WITHOUT DIFFERENTIAL     Patient Type: Primary Prevention and RA     Etiology of Established CVD if Present: n/a   1) RA is consider risk equivalent   2) Ao stenosis, moderate  3) Ao root dilatation    Lipid Management: Qualifies for Statin Therapy Based on 2013 ACC/AHA Guidelines: no  Calculated 10-Year Risk of ASCVD: <7.5%   Currently on Statin: No  NLA Risk Category:   Moderate: RA as risk equivalent   Tx threshold:  non-HDL-C >159, LDL-C >129  Tx goal:  non-HDL <130, LDL-C <100 (optional: apoB<90)   At goal:  yes  Tx plan:  - consider additional testing:  CAC>299, hsCRP>1.9, Lp(a)>49, ACR>29  - continue TLC measures, update labs     Blood Pressure Management:ACC/AHA (2017) goal <130/80  Home BP at goal:  yes  Office BP at goal:  Yes  Afterload reduction with ARB and/or BB appropriate to reduce Ao root dilatation progression.    Plan:   - continue home BP monitoring, reviewed correct technique:  Yes   - order 24h ABPM:  NO  - monitor lytes/gfr routinely   - start losartan 25mg QHS     Glycemic Status: Normal    Anti-Platelet/Anti-Coagulant Tx: no    Smoking: continued complete avoidance of all tobacco products     Physical Activity: continue healthy activity to improve CV fitness, see care instructions for additional details     Weight Management and Nutrition: Dietary plan was discussed with patient at this visit including DASH, low sodium and/or as outlined in care instructions       Other:   1) Aortic root dilatation, 3.9cm, asymptomatic.  Appears to be sporadic but echo findings my indicate bicuspid AoV.  Will need to repeat study.  No body habitus of note for marfans or EDS.  No other in family affected.    - continue losartan 25mg   - repeat echo now    2) Moderate Ao Stenosis with murmur. No symptoms.   - check echo  - consider cardiology involvement if worsening over time      3) palpitations - check Zio patch for 2 weeks     4) MONICA   - ref back to sleep medicine to re-establish and start CPAP to reduce heart strain     5) RA - on biologics, consider CVD risk equivalent and major risk factor. Continue humira  - defer mgmt to rheumatology.      6) BLE, left > right edema, pitting.  Unclear etiology.  Reports steroids helped to reduce swelling and not worsen.    - labs and echo    7) anemia, microcytic.  mentzer index = 23, indicating likely iron-def  - will order labs but defer mgmt to PCP for ongoing treatment and monitoring   - recommend further iron eval, consider GI blood loss eval with FIT  - consider iron transfusion due to possible malabsorption   - recommend increase ferrous sulfate 325mg BID with Vit C 500mg daily for now     8) hypoalbuminuria, low Ca2+ related to this.  Could be associated to edema development. May be absorption issues.   - increase protein intake     Instructed to follow-up with PCP for remainder of adult medical needs: yes  We will partner with other providers in the management of established vascular disease and cardiometabolic risk factors.    Studies to Be Obtained: check echo now  Labs to Be Obtained:  Iron studies, FIT, retic, CBC, AM cortisol, BNP    Follow up in: 1 mo with me, f/u with PCP for iron studies review and further testing and tx     Wojciech Silva M.D.

## 2019-08-15 NOTE — PATIENT INSTRUCTIONS
- recommend using ferrous sulfate 325mg 2 times daily with Vit C 500mg daily for now   - see PCP to discuss anemia evaluation, consider iron transfusion   - increase protein in diet, even a supplement   - complete echocardiogram and additional labs   - check labs around 8a in the morning

## 2019-08-20 ENCOUNTER — TELEPHONE (OUTPATIENT)
Dept: VASCULAR LAB | Facility: MEDICAL CENTER | Age: 44
End: 2019-08-20

## 2019-08-20 RX ORDER — FUROSEMIDE 20 MG/1
20 TABLET ORAL 2 TIMES DAILY
Qty: 60 TAB | Refills: 6 | Status: ON HOLD | OUTPATIENT
Start: 2019-08-20 | End: 2019-10-17

## 2019-08-20 NOTE — TELEPHONE ENCOUNTER
Spoke with pt on the phone.  He states that his swelling is worse on torsemide than with furosemide and would like to switch back.  Pt will stop torsemide and restart furosemide 20mg BID.  He will have his echo done as well as labwork prior to his appt in 3wks with Dr. Silva.      Shaista Vásquez, [facility]

## 2019-08-29 ENCOUNTER — HOSPITAL ENCOUNTER (OUTPATIENT)
Dept: CARDIOLOGY | Facility: MEDICAL CENTER | Age: 44
End: 2019-08-29
Attending: FAMILY MEDICINE
Payer: COMMERCIAL

## 2019-08-29 DIAGNOSIS — I35.0 AORTIC VALVE STENOSIS, ETIOLOGY OF CARDIAC VALVE DISEASE UNSPECIFIED: ICD-10-CM

## 2019-08-29 DIAGNOSIS — I77.810 AORTIC ROOT DILATION (HCC): ICD-10-CM

## 2019-08-29 LAB
LV EJECT FRACT  99904: 65
LV EJECT FRACT MOD 2C 99903: 60.59
LV EJECT FRACT MOD 4C 99902: 63.58
LV EJECT FRACT MOD BP 99901: 63.01

## 2019-08-29 PROCEDURE — 93306 TTE W/DOPPLER COMPLETE: CPT | Mod: 26 | Performed by: INTERNAL MEDICINE

## 2019-08-29 PROCEDURE — 93306 TTE W/DOPPLER COMPLETE: CPT

## 2019-09-02 ENCOUNTER — OFFICE VISIT (OUTPATIENT)
Dept: URGENT CARE | Facility: PHYSICIAN GROUP | Age: 44
End: 2019-09-02
Payer: COMMERCIAL

## 2019-09-02 VITALS
BODY MASS INDEX: 38.77 KG/M2 | DIASTOLIC BLOOD PRESSURE: 80 MMHG | RESPIRATION RATE: 18 BRPM | TEMPERATURE: 98.2 F | WEIGHT: 278 LBS | HEART RATE: 86 BPM | OXYGEN SATURATION: 100 % | SYSTOLIC BLOOD PRESSURE: 126 MMHG

## 2019-09-02 DIAGNOSIS — S76.212A GROIN STRAIN, LEFT, INITIAL ENCOUNTER: ICD-10-CM

## 2019-09-02 PROCEDURE — 99214 OFFICE O/P EST MOD 30 MIN: CPT | Mod: 25 | Performed by: PHYSICIAN ASSISTANT

## 2019-09-02 RX ORDER — METHYLPREDNISOLONE SODIUM SUCCINATE 125 MG/2ML
125 INJECTION, POWDER, LYOPHILIZED, FOR SOLUTION INTRAMUSCULAR; INTRAVENOUS ONCE
Status: COMPLETED | OUTPATIENT
Start: 2019-09-02 | End: 2019-09-02

## 2019-09-02 RX ORDER — METHOCARBAMOL 750 MG/1
750 TABLET, FILM COATED ORAL 4 TIMES DAILY PRN
Qty: 30 TAB | Refills: 0 | Status: SHIPPED | OUTPATIENT
Start: 2019-09-02 | End: 2019-09-02 | Stop reason: SDUPTHER

## 2019-09-02 RX ORDER — OXYCODONE HYDROCHLORIDE AND ACETAMINOPHEN 5; 325 MG/1; MG/1
1 TABLET ORAL EVERY 6 HOURS PRN
Qty: 20 TAB | Refills: 0 | Status: SHIPPED | OUTPATIENT
Start: 2019-09-02 | End: 2019-09-09 | Stop reason: SDUPTHER

## 2019-09-02 RX ORDER — METHOCARBAMOL 750 MG/1
750 TABLET, FILM COATED ORAL 4 TIMES DAILY PRN
Qty: 30 TAB | Refills: 0 | Status: SHIPPED | OUTPATIENT
Start: 2019-09-02 | End: 2019-09-09 | Stop reason: SDUPTHER

## 2019-09-02 RX ADMIN — METHYLPREDNISOLONE SODIUM SUCCINATE 125 MG: 125 INJECTION, POWDER, LYOPHILIZED, FOR SOLUTION INTRAMUSCULAR; INTRAVENOUS at 12:28

## 2019-09-02 NOTE — PROGRESS NOTES
Chief Complaint   Patient presents with   • Groin Pain     after falling at [employer]        HISTORY OF PRESENT ILLNESS: Patient is a 44 y.o. male who presents today for the following:    Patient comes in for evaluation of severe left groin pain.  4 days prior to arrival patient tripped on a rug at a local gas station, causing him to almost fall.  He caught himself with his left leg, his good leg, but has had worsening pain in the left groin since.  He states starting yesterday it has been excruciating, making even the simplest movements painful.  He has a bad right knee and uses a cane on a regular basis but has been using crutches recently.  He is currently on 20 mg of prednisone daily on a 3-week taper from his rheumatologist.  He states he has not noticed any difference in his pain level since being on the steroids and he started at 80 mg.  He was recently hospitalized for GI bleed.  He has a total right knee replacement scheduled in 2 weeks.  Patient is tearful in the clinic because he is in so much pain.  He has been unable to get any relief with acetaminophen and is unable to take anti-inflammatories given his recent GI bleed.  Ice, heat, muscle rubs have not been helping either.    Patient Active Problem List    Diagnosis Date Noted   • Iron deficiency anemia, unspecified iron deficiency anemia type 08/15/2019   • Localized edema 08/15/2019   • Aortic ejection murmur 03/08/2019   • Obesity (BMI 35.0-39.9 without comorbidity) ([facility]) 08/09/2018   • Pagophagia 11/13/2017   • Rheumatoid arthritis ([facility]) 09/08/2015   • Chronic pain of right knee 09/08/2015   • Adult ADHD 09/08/2010   • MONICA (obstructive sleep apnea) 03/09/2010   • Bleeding ulcer 05/21/2009   • Depression 05/21/2009   • Eczema 05/21/2009       Allergies:Nsaids; Penicillins; and Phenergan [promethazine hcl]    Current Outpatient Medications Ordered in Epic   Medication Sig Dispense Refill   • oxyCODONE-acetaminophen (PERCOCET) 5-325 MG Tab Take 1 Tab by  mouth every 6 hours as needed for Moderate Pain or Severe Pain for up to 5 days. 20 Tab 0   • methocarbamol (ROBAXIN) 750 MG Tab Take 1 Tab by mouth 4 times a day as needed (pain). 30 Tab 0   • furosemide (LASIX) 20 MG Tab Take 1 Tab by mouth 2 times a day. 60 Tab 6   • omeprazole (PRILOSEC) 20 MG delayed-release capsule Take 40 mg by mouth every day.     • ferrous sulfate 325 (65 Fe) MG tablet Take 1 Tab by mouth 2 Times a Day.     • clotrimazole-betamethasone (LOTRISONE) 1-0.05 % Cream Apply 1 g to affected area(s) 2 times a day. 1 Tube 0   • losartan (COZAAR) 25 MG Tab Take 1 Tab by mouth every day for 360 days. 90 Tab 3   • Adalimumab (HUMIRA PEN SC) Inject  as instructed.     • Acetaminophen (TYLENOL PO) Take  by mouth.     • fluconazole (DIFLUCAN) 150 MG tablet Take 1 Tab by mouth every day. (Patient not taking: Reported on 2019) 2 Tab 0     No current Epic-ordered facility-administered medications on file.        Past Medical History:   Diagnosis Date   • ADD (attention deficit disorder)    • Alcohol abuse    • Allergic rhinitis 2009   • Anxiety 2009   • Back pain 2009   • Bipolar disorder (HCC)    • Bleeding ulcer 2009   • Depression 2009   • Eczema 2009   • History of bleeding ulcers 2015   • Insomnia 2009   • Migraine 2009   • Obesity, morbid (HCC) 2009   • Rheumatoid arteritis    • Sleep apnea 2009       Social History     Tobacco Use   • Smoking status: Former Smoker     Packs/day: 0.25     Years: 3.00     Pack years: 0.75     Types: Cigarettes, Cigars     Last attempt to quit: 2001     Years since quittin.6   • Smokeless tobacco: Never Used   • Tobacco comment: occasional cigar   Substance Use Topics   • Alcohol use: No     Alcohol/week: 0.0 oz     Comment: quit 2.5 yrs ago   • Drug use: No       Family Status   Relation Name Status   • Mo  Alive   • Fa     • MGMo  (Not Specified)   • OTHER  (Not Specified)   • MAunt  (Not  Specified)   • Neg Hx  (Not Specified)     Family History   Problem Relation Age of Onset   • Hypertension Mother    • Arthritis Mother    • Depression Mother    • Cancer Father         bladder   • Schizophrenia Father    • Cancer Maternal Grandmother         breast   • Heart Disease Maternal Grandmother    • Psychiatric Illness Other    • Stroke Maternal Aunt    • Other Neg Hx         no connective tissue disorders or known aortic disease       Review of Systems:   Constitutional ROS: No unexpected change in weight, No weakness, No fatigue  Eye ROS: No recent significant change in vision, No eye pain, redness, discharge  Ear ROS: No drainage, No tinnitus or vertigo, No recent change in hearing  Mouth/Throat ROS: No teeth or gum problems, No bleeding gums, No tongue complaints  Neck ROS: No swollen glands, No significant pain in neck  Pulmonary ROS: No chronic cough, sputum, or hemoptysis, No dyspnea on exertion, No wheezing  Cardiovascular ROS: No diaphoresis, No edema, No palpitations  Gastrointestinal ROS: No change in bowel habits, No significant change in appetite, No nausea, vomiting, diarrhea, or constipation  Musculoskeletal/Extremities ROS: Severe left groin pain.  Hematologic/Lymphatic ROS: No chills, No night sweats, No weight loss  Skin/Integumentary ROS: No edema, No evidence of rash, No itching      Exam:  /80   Pulse 86   Temp 36.8 °C (98.2 °F) (Temporal)   Resp 18   Wt (!) 126.1 kg (278 lb)   SpO2 100%   General: Well developed, well nourished.  Distress due to pain.  Moderate distress due to pain.  Pulmonary: Unlabored respiratory effort.   Extremities: Left upper leg is wrapped in Ace bandage but patient denies any ecchymosis or swelling.  He is exquisitely tender to touch in the groin and on the medial aspect of the left upper leg under the Ace bandage, even with the Ace bandage in place.  Patient is using crutches and is very slow to move/ambulate.  Neurologic: Grossly nonfocal. No  facial asymmetry noted.  Skin: Warm, dry, good turgor. No rashes in visible areas.   Psych: Normal mood. Alert and oriented x3. Judgment and insight is normal.    SoluMedrol 25 mg IM given in clinic    Assessment/Plan:  Given patient's severity of pain, will prescribe Percocet in addition to the muscle relaxer.  Patient states his wife will keep his Percocet and dose to him as needed.  SoluMedrol given in clinic to help with some of the inflammation.  Continue ice, heat, and over-the-counter muscle rubs as needed for additional pain relief.  Discussed patient's care with his wife over the telephone who agrees.  Follow-up for worsening or persistent symptoms.    Prescription Monitoring Program report was reviewed. No evidence of medication abuse or misuse. Discussed the Controlled Substance Use Informed Consent which includes risks and benefits, proper use, storage and disposal, refills, minors, opioids and narcotics, and alternatives. I have assessed the patient’s risk for abuse, dependency, and addiction using the validated Opioid Risk Tool available at https://www.mdcalc.com/fshxua-iwcc-qqqc-ort-narcotic-abuse. All questions answered.   1. Groin strain, left, initial encounter  oxyCODONE-acetaminophen (PERCOCET) 5-325 MG Tab    methylPREDNISolone sod succ (SOLU-MEDROL) 125 MG injection 125 mg    methocarbamol (ROBAXIN) 750 MG Tab    DISCONTINUED: methocarbamol (ROBAXIN) 750 MG Tab

## 2019-09-03 ENCOUNTER — TELEPHONE (OUTPATIENT)
Dept: VASCULAR LAB | Facility: MEDICAL CENTER | Age: 44
End: 2019-09-03

## 2019-09-03 NOTE — TELEPHONE ENCOUNTER
Echo with stable aortic diameter (3.9 cm) and moderate AS - not noted as to whether native valve is tri or bi leaflet  Will d/w patient at f/u visit.  Repeat in one year.    Michael Bloch, MD  [facility]

## 2019-09-04 ENCOUNTER — APPOINTMENT (OUTPATIENT)
Dept: ADMISSIONS | Facility: MEDICAL CENTER | Age: 44
End: 2019-09-04
Payer: COMMERCIAL

## 2019-09-09 ENCOUNTER — OFFICE VISIT (OUTPATIENT)
Dept: URGENT CARE | Facility: PHYSICIAN GROUP | Age: 44
End: 2019-09-09
Payer: COMMERCIAL

## 2019-09-09 VITALS
WEIGHT: 236 LBS | DIASTOLIC BLOOD PRESSURE: 82 MMHG | BODY MASS INDEX: 32.92 KG/M2 | RESPIRATION RATE: 14 BRPM | TEMPERATURE: 98.1 F | OXYGEN SATURATION: 100 % | SYSTOLIC BLOOD PRESSURE: 124 MMHG | HEART RATE: 76 BPM

## 2019-09-09 DIAGNOSIS — M79.605 LEFT LEG PAIN: ICD-10-CM

## 2019-09-09 DIAGNOSIS — S76.212A GROIN STRAIN, LEFT, INITIAL ENCOUNTER: ICD-10-CM

## 2019-09-09 PROCEDURE — 99214 OFFICE O/P EST MOD 30 MIN: CPT | Performed by: PHYSICIAN ASSISTANT

## 2019-09-09 RX ORDER — METHOCARBAMOL 750 MG/1
750 TABLET, FILM COATED ORAL 4 TIMES DAILY PRN
Qty: 60 TAB | Refills: 0 | Status: SHIPPED | OUTPATIENT
Start: 2019-09-09 | End: 2019-09-19

## 2019-09-09 RX ORDER — OXYCODONE HYDROCHLORIDE AND ACETAMINOPHEN 5; 325 MG/1; MG/1
1 TABLET ORAL EVERY 6 HOURS PRN
Qty: 20 TAB | Refills: 0 | Status: ON HOLD | OUTPATIENT
Start: 2019-09-09 | End: 2019-09-15

## 2019-09-09 NOTE — PROGRESS NOTES
Chief Complaint   Patient presents with   • Groin Pain     Follow up from last visit        HISTORY OF PRESENT ILLNESS: Patient is a 44 y.o. male who presents today for the following:    Patient comes in for reevaluation of left leg pain.  He was seen 9/2/2019, a week ago, for the same.  4 days prior to his initial visit he tripped on a rug at a local gas station, causing him to almost fall.  He caught himself with his left leg, has good leg, but has had worsening pain since.  He states the muscle relaxer has helped to take the edge off but still continues to have moderate to severe pain constantly.  He has difficulty sleeping and states it woke him from sleep this morning.  The Percocet has been taking the edge off, helping him fall asleep, but the pain relief does not last very long.  He states that he is in so much pain he is not sure if the medication is helping or not.  He is scheduled for a knee replacement on 9/16/2019 and is still uncertain if this is something they are going to move forward with given his level of pain in his left leg.  The pain is primarily on the medial aspect of the left upper leg, mid leg but radiates up the posterior aspect of the left buttock and left lower back.  Over the last few years patient has been bearing most of his weight on his left leg due to his bad right knee and has had fairly consistent right low back pain.  Over the last week or so the right low back pain has improved as he has shifted his weight back to his right leg.  Patient does not have any radiating pain past the knee.  He is using a front wheeled walker.    Patient Active Problem List    Diagnosis Date Noted   • Iron deficiency anemia, unspecified iron deficiency anemia type 08/15/2019   • Localized edema 08/15/2019   • Aortic ejection murmur 03/08/2019   • Obesity (BMI 35.0-39.9 without comorbidity) ([facility]) 08/09/2018   • Pagophagia 11/13/2017   • Rheumatoid arthritis ([facility]) 09/08/2015   • Chronic pain of right  knee 09/08/2015   • Adult ADHD 09/08/2010   • MONICA (obstructive sleep apnea) 03/09/2010   • Bleeding ulcer 05/21/2009   • Depression 05/21/2009   • Eczema 05/21/2009       Allergies:Nsaids; Penicillins; and Phenergan [promethazine hcl]    Current Outpatient Medications Ordered in Epic   Medication Sig Dispense Refill   • methocarbamol (ROBAXIN) 750 MG Tab Take 1 Tab by mouth 4 times a day as needed (pain). 60 Tab 0   • oxyCODONE-acetaminophen (PERCOCET) 5-325 MG Tab Take 1 Tab by mouth every 6 hours as needed for Moderate Pain or Severe Pain for up to 5 days. 20 Tab 0   • furosemide (LASIX) 20 MG Tab Take 1 Tab by mouth 2 times a day. 60 Tab 6   • omeprazole (PRILOSEC) 20 MG delayed-release capsule Take 40 mg by mouth every day.     • ferrous sulfate 325 (65 Fe) MG tablet Take 1 Tab by mouth 2 Times a Day.     • fluconazole (DIFLUCAN) 150 MG tablet Take 1 Tab by mouth every day. (Patient not taking: Reported on 9/2/2019) 2 Tab 0   • clotrimazole-betamethasone (LOTRISONE) 1-0.05 % Cream Apply 1 g to affected area(s) 2 times a day. 1 Tube 0   • losartan (COZAAR) 25 MG Tab Take 1 Tab by mouth every day for 360 days. 90 Tab 3   • Adalimumab (HUMIRA PEN SC) Inject  as instructed.     • Acetaminophen (TYLENOL PO) Take  by mouth.       No current Epic-ordered facility-administered medications on file.        Past Medical History:   Diagnosis Date   • ADD (attention deficit disorder)    • Alcohol abuse    • Allergic rhinitis 5/21/2009   • Anxiety 5/21/2009   • Back pain 5/21/2009   • Bipolar disorder (HCC)    • Bleeding ulcer 5/21/2009   • Depression 5/21/2009   • Eczema 5/21/2009   • History of bleeding ulcers 2/12/2015   • Insomnia 5/21/2009   • Migraine 5/21/2009   • Obesity, morbid (HCC) 5/21/2009   • Rheumatoid arteritis    • Sleep apnea 5/21/2009       Social History     Tobacco Use   • Smoking status: Former Smoker     Packs/day: 0.25     Years: 3.00     Pack years: 0.75     Types: Cigarettes, Cigars     Last attempt  to quit: 2001     Years since quittin.6   • Smokeless tobacco: Never Used   • Tobacco comment: occasional cigar   Substance Use Topics   • Alcohol use: No     Alcohol/week: 0.0 oz     Comment: quit 2.5 yrs ago   • Drug use: No       Family Status   Relation Name Status   • Mo  Alive   • Fa     • MGMo  (Not Specified)   • OTHER  (Not Specified)   • MAunt  (Not Specified)   • Neg Hx  (Not Specified)     Family History   Problem Relation Age of Onset   • Hypertension Mother    • Arthritis Mother    • Depression Mother    • Cancer Father         bladder   • Schizophrenia Father    • Cancer Maternal Grandmother         breast   • Heart Disease Maternal Grandmother    • Psychiatric Illness Other    • Stroke Maternal Aunt    • Other Neg Hx         no connective tissue disorders or known aortic disease       Review of Systems:    Constitutional ROS: No unexpected change in weight, No weakness, No fatigue  Eye ROS: No recent significant change in vision, No eye pain, redness, discharge  Ear ROS: No drainage, No tinnitus or vertigo, No recent change in hearing  Mouth/Throat ROS: No teeth or gum problems, No bleeding gums, No tongue complaints  Neck ROS: No swollen glands, No significant pain in neck  Pulmonary ROS: No chronic cough, sputum, or hemoptysis, No dyspnea on exertion, No wheezing  Cardiovascular ROS: No diaphoresis, No edema, No palpitations  Gastrointestinal ROS: No change in bowel habits, No significant change in appetite, No nausea, vomiting, diarrhea, or constipation  Musculoskeletal/Extremities ROS: Left leg pain.  Hematologic/Lymphatic ROS: No chills, No night sweats, No weight loss  Skin/Integumentary ROS: No edema, No evidence of rash, No itching      Exam:  /82   Pulse 76   Temp 36.7 °C (98.1 °F) (Temporal)   Resp 14   Wt 107 kg (236 lb)   SpO2 100%   General: Well developed, well nourished.  Moderate distress due to pain.  Pulmonary: Unlabored respiratory effort.   Extremities:  Leg pain is not reproducible with palpation.  No palpable abnormalities noted.  Patient has a very slow gait with a front wheeled walker, due to pain.  Neurologic: Grossly nonfocal. No facial asymmetry noted.  Skin: Warm, dry, good turgor. No rashes in visible areas.   Psych: Normal mood. Alert and oriented x3. Judgment and insight is normal.    Assessment/Plan:  Refilling patient's muscle relaxer and Percocet as it seems to help take the edge off.  Patient's wife has been dosing his Percocet as needed.  Continue using a front wheeled walker.  Placing an urgent referral to physiatry.  1. Groin strain, left, initial encounter  methocarbamol (ROBAXIN) 750 MG Tab    oxyCODONE-acetaminophen (PERCOCET) 5-325 MG Tab    REFERRAL TO PHYSIATRY (PMR)   2. Left leg pain  REFERRAL TO PHYSIATRY (PMR)

## 2019-09-11 VITALS — WEIGHT: 240.74 LBS | BODY MASS INDEX: 33.7 KG/M2 | HEIGHT: 71 IN

## 2019-09-11 DIAGNOSIS — Z01.812 PRE-OPERATIVE LABORATORY EXAMINATION: ICD-10-CM

## 2019-09-11 DIAGNOSIS — Z01.810 PRE-OPERATIVE CARDIOVASCULAR EXAMINATION: ICD-10-CM

## 2019-09-11 LAB
ANION GAP SERPL CALC-SCNC: 5 MMOL/L (ref 0–11.9)
BUN SERPL-MCNC: 11 MG/DL (ref 8–22)
CALCIUM SERPL-MCNC: 8 MG/DL (ref 8.5–10.5)
CHLORIDE SERPL-SCNC: 106 MMOL/L (ref 96–112)
CO2 SERPL-SCNC: 25 MMOL/L (ref 20–33)
CREAT SERPL-MCNC: 0.88 MG/DL (ref 0.5–1.4)
EKG IMPRESSION: NORMAL
ERYTHROCYTE [DISTWIDTH] IN BLOOD BY AUTOMATED COUNT: 49.2 FL (ref 35.9–50)
GLUCOSE SERPL-MCNC: 71 MG/DL (ref 65–99)
HCT VFR BLD AUTO: 23.6 % (ref 42–52)
HGB BLD-MCNC: 6.9 G/DL (ref 14–18)
MCH RBC QN AUTO: 24.2 PG (ref 27–33)
MCHC RBC AUTO-ENTMCNC: 29.2 G/DL (ref 33.7–35.3)
MCV RBC AUTO: 82.8 FL (ref 81.4–97.8)
PLATELET # BLD AUTO: 304 K/UL (ref 164–446)
PMV BLD AUTO: 9.3 FL (ref 9–12.9)
POTASSIUM SERPL-SCNC: 4.3 MMOL/L (ref 3.6–5.5)
RBC # BLD AUTO: 2.85 M/UL (ref 4.7–6.1)
SODIUM SERPL-SCNC: 136 MMOL/L (ref 135–145)
WBC # BLD AUTO: 5.5 K/UL (ref 4.8–10.8)

## 2019-09-11 PROCEDURE — 80048 BASIC METABOLIC PNL TOTAL CA: CPT

## 2019-09-11 PROCEDURE — 85027 COMPLETE CBC AUTOMATED: CPT

## 2019-09-11 PROCEDURE — 36415 COLL VENOUS BLD VENIPUNCTURE: CPT

## 2019-09-11 PROCEDURE — 87641 MR-STAPH DNA AMP PROBE: CPT

## 2019-09-11 PROCEDURE — 93010 ELECTROCARDIOGRAM REPORT: CPT | Performed by: INTERNAL MEDICINE

## 2019-09-11 PROCEDURE — 93005 ELECTROCARDIOGRAM TRACING: CPT | Performed by: ORTHOPAEDIC SURGERY

## 2019-09-11 PROCEDURE — 87640 STAPH A DNA AMP PROBE: CPT

## 2019-09-11 NOTE — DISCHARGE PLANNING
DISCHARGE PLANNING NOTE - TOTAL JOINT     Procedure: Procedure(s):  ARTHROPLASTY, KNEE, TOTAL  Procedure Date: 9/16/2019  Insurance:  Payor: [facility] / Plan: [facility] / Product Type: *No Product type* /   Equipment currently available at home? front-wheel walker  Steps into the home? 2 from front and from garage  Steps within the home? none  Toilet height? Standard suggested a riser  Type of shower?Small walk-in shower with hose  Who will be with you during your recovery? Spouse-Katiana NP  Is Outpatient Physical Therapy set up after surgery? No -has script  Did you take the Total Joint Class and where? No-Not aware of, given and reviewed     Plan: Met with patient during preadmission appointment.  Reviewed Equipment Resource list and provided a copy to the patient.  Provided and reviewed Home Safety Checklist.  Quiet Hours information given and reviewed.  There are no identified discharge needs at this time.  Anticipate discharge home with family.  No Dme needed at this time.

## 2019-09-11 NOTE — OR NURSING
Pre admit apt: Pt. Instructed to continue regularly prescribed medications through day before surgery.  Instructed to take the following medications, the day of surgery, with a sip of water per anesthesia protocol:robaxin, prilosec, oxycodone.    Pt was instructed, per endocrinologist to not take Humira dose, which would have been scheduled on 9/16.

## 2019-09-12 ENCOUNTER — OFFICE VISIT (OUTPATIENT)
Dept: VASCULAR LAB | Facility: MEDICAL CENTER | Age: 44
End: 2019-09-12
Attending: FAMILY MEDICINE
Payer: COMMERCIAL

## 2019-09-12 VITALS
SYSTOLIC BLOOD PRESSURE: 112 MMHG | BODY MASS INDEX: 33.6 KG/M2 | WEIGHT: 240 LBS | HEART RATE: 62 BPM | HEIGHT: 71 IN | DIASTOLIC BLOOD PRESSURE: 59 MMHG

## 2019-09-12 DIAGNOSIS — G47.33 OSA (OBSTRUCTIVE SLEEP APNEA): ICD-10-CM

## 2019-09-12 DIAGNOSIS — I77.810 AORTIC ROOT DILATION (HCC): ICD-10-CM

## 2019-09-12 DIAGNOSIS — R60.0 LOCALIZED EDEMA: ICD-10-CM

## 2019-09-12 DIAGNOSIS — D50.9 IRON DEFICIENCY ANEMIA, UNSPECIFIED IRON DEFICIENCY ANEMIA TYPE: ICD-10-CM

## 2019-09-12 DIAGNOSIS — I35.0 AORTIC STENOSIS, MODERATE: ICD-10-CM

## 2019-09-12 LAB
SCCMEC + MECA PNL NOSE NAA+PROBE: POSITIVE
SCCMEC + MECA PNL NOSE NAA+PROBE: POSITIVE

## 2019-09-12 PROCEDURE — 99215 OFFICE O/P EST HI 40 MIN: CPT | Performed by: FAMILY MEDICINE

## 2019-09-12 PROCEDURE — 99212 OFFICE O/P EST SF 10 MIN: CPT | Performed by: FAMILY MEDICINE

## 2019-09-12 ASSESSMENT — ENCOUNTER SYMPTOMS
ORTHOPNEA: 0
SEIZURES: 0
DIAPHORESIS: 0
DIZZINESS: 0
COUGH: 0
BLURRED VISION: 0
ABDOMINAL PAIN: 0
FEVER: 0
SHORTNESS OF BREATH: 0
BLOOD IN STOOL: 0
INSOMNIA: 0
HEMOPTYSIS: 0
BRUISES/BLEEDS EASILY: 0
DEPRESSION: 0
NERVOUS/ANXIOUS: 1
TREMORS: 0
PALPITATIONS: 0
DIARRHEA: 0
HEADACHES: 0
MYALGIAS: 0
DOUBLE VISION: 0
FOCAL WEAKNESS: 0
SORE THROAT: 0
WEAKNESS: 0
CHILLS: 0
WHEEZING: 0
VOMITING: 0
NAUSEA: 0

## 2019-09-12 NOTE — PATIENT INSTRUCTIONS
1) recommend evaluation in the ED for further prompt evaluation for acute blood loss or ongoing bleeding ulcerations  2) you may need RBC transfusions   3) we will repeat echo in 1 year.

## 2019-09-12 NOTE — PROGRESS NOTES
FOLLOW-UP VASCULAR MED VISIT  Subjective:   Richard Hubbard is a 44 y.o. male who presents today 9/12/19 for   Chief Complaint   Patient presents with   • Follow-Up     Aortic root dilation/edema; labs/echo   intially referred by PCP for eval for vascular disease assessment in context of Ao stenosis.     HPI:    Ao root dilatation with moderate Ao stenosis:  No current issues noted except for edema.  Had repeat echo with stable 3.9cm dilation.   Pertinent pmhx:   Had echo 12/2018 due to worsening murmur per rheumatologist.  Had had murmur for many years since his teens.  Reports no hx of recurrent strep or rheum fever.  No hx of syphlis or connective tissue disorders.  No fhx of Aortic disease.  Also noted to have mild Ao dilatation = 3.9cm.  Notes intermittent palpitations, no CP, SOB, dizz or syncope.  No postural syncopal episodes.     RA:    Worsening arthritis, pending surgery 9/16 of right knee  Using humira, no MTX.  Seeing Dr. Conway     Edema:   After switch to torsemide, felt edema worsened and called after 5 days of tx and aprn switched back to furosemide 20mg BID. Reports improvement.     Reports new rashes and worsening swelling.  Had increased BLE swelling and scrotal swelling.   Has arthritis flare-up, finished steroids.   Only humira, pending with Dr. Conway.   Swelling better with lasix from PCP.  Noted to have low albumin.  No prior CHF on echo.  No recent BNP.  Kidney function and liver function stable except low alb and prot    Anemia:   Had preop labs yesterday for planned ortho surgery on Monday.  Hgb = 6.9.  pt has increasing pallor, denies dizziness, CP, SOB.  Denies active bleeding from upper or lower GI tract. EGD on 8/27, had bleeding ulcer that was apparently cauterized.  Did not get colonoscopy. Reports chronic anemia, taking iron and has absorption issues due to prior surgery.   Did not complete labs I ordered from 8/15 labs or fecal testing to date.  No prior transfusions or infusions  of iron.     HTN:    No prior hx of HTN or hx of use of antiHTN meds.     MONICA:    Supposed on to be CPAP, machine needs to be titrated.  Has not seen sleep MD for 2 years.   Reports snoring and lack of restful sleep.      Antiplatelet/anticoagulation:   No    Hyperlipidemia:   No, last lipid panel 9/2018 with normal findings.     Clinical evidence of ASCVD:     1) hx of MI/ACS:  No   2) coronary or other revascularization procedure: No   3) TIA/ischemic CVA: No   4) PAD (including SUSHMA <0.9): No   5) documented (CAD, Renal athero, AA due to athero, carotid plaque >50% stenosis: No    Major RFs:     1) Age (Men>44, women>54):  no   2) Fhx early CAD (<55 men, <65 women):  no   3) cigarette smoking:  No   4) high BP >139/89 or on tx: no   5) Low HDL-C (<40 men, <50 women):  no    Other ASCVD risk factors:     T1/T2D: No   CKD:  No   Sleeping disorder/MONICA: No   Hypothyroidism: No    Social History     Tobacco Use   • Smoking status: Current Every Day Smoker     Packs/day: 0.25     Years: 3.00     Pack years: 0.75     Types: Cigarettes, Cigars   • Smokeless tobacco: Never Used   • Tobacco comment: occasional cigar   Substance Use Topics   • Alcohol use: No     Alcohol/week: 0.0 oz     Comment: quit 2.5 yrs ago   • Drug use: No     Outpatient Encounter Medications as of 9/12/2019   Medication Sig Dispense Refill   • methocarbamol (ROBAXIN) 750 MG Tab Take 1 Tab by mouth 4 times a day as needed (pain). 60 Tab 0   • oxyCODONE-acetaminophen (PERCOCET) 5-325 MG Tab Take 1 Tab by mouth every 6 hours as needed for Moderate Pain or Severe Pain for up to 5 days. 20 Tab 0   • furosemide (LASIX) 20 MG Tab Take 1 Tab by mouth 2 times a day. 60 Tab 6   • omeprazole (PRILOSEC) 20 MG delayed-release capsule Take 40 mg by mouth every day.     • ferrous sulfate 325 (65 Fe) MG tablet Take 1 Tab by mouth 2 Times a Day.     • clotrimazole-betamethasone (LOTRISONE) 1-0.05 % Cream Apply 1 g to affected area(s) 2 times a day. 1 Tube 0   •  "losartan (COZAAR) 25 MG Tab Take 1 Tab by mouth every day for 360 days. 90 Tab 3   • Adalimumab (HUMIRA PEN SC) Inject 40 mg as instructed every 14 days.     • Acetaminophen (TYLENOL PO) Take 325 mg by mouth.       No facility-administered encounter medications on file as of 9/12/2019.      Allergies   Allergen Reactions   • Benadryl [Altaryl]      \"I get agitated\"   • Nsaids      Bleeding, \"I had a bleeding ulcer\"   • Phenergan [Promethazine Hcl]      \"I get very agitated\"   • Penicillins      \"Had some dizziness\"     DIET AND EXERCISE:  Weight Change: lost 100lb over 3 years   BMI Readings from Last 4 Encounters:   09/12/19 33.47 kg/m²   09/09/19 32.92 kg/m²   09/02/19 38.77 kg/m²   08/15/19 32.68 kg/m²      Diet: common adult with decrease portion sizes   Exercise: moderate regular exercise program     Review of Systems   Constitutional: Negative for chills, diaphoresis, fever and malaise/fatigue.   HENT: Negative for nosebleeds, sore throat and tinnitus.    Eyes: Negative for blurred vision and double vision.   Respiratory: Negative for cough, hemoptysis, shortness of breath and wheezing.    Cardiovascular: Negative for chest pain, palpitations, orthopnea and leg swelling.   Gastrointestinal: Negative for abdominal pain, blood in stool, diarrhea, melena, nausea and vomiting.   Genitourinary: Negative for hematuria.   Musculoskeletal: Negative for joint pain and myalgias.   Skin: Negative for itching and rash.   Neurological: Negative for dizziness, tremors, focal weakness, seizures, weakness and headaches.   Endo/Heme/Allergies: Does not bruise/bleed easily.   Psychiatric/Behavioral: Negative for depression. The patient is nervous/anxious. The patient does not have insomnia.       Objective:     Vitals:    09/12/19 1408   BP: 112/59   BP Location: Left arm   Patient Position: Sitting   BP Cuff Size: Adult   Pulse: 62   Weight: 108.9 kg (240 lb)   Height: 1.803 m (5' 11\")      BP Readings from Last 4 Encounters: "   09/12/19 112/59   09/09/19 124/82   09/02/19 126/80   08/15/19 108/61      Body mass index is 33.47 kg/m².     Physical Exam   Constitutional: He is oriented to person, place, and time. He appears well-developed and well-nourished. He is cooperative. No distress.   HENT:   Head: Normocephalic and atraumatic.   Mouth/Throat: Oropharynx is clear and moist and mucous membranes are normal.   Eyes: Pupils are equal, round, and reactive to light. Conjunctivae are normal.   Neck: Trachea normal and normal range of motion. Neck supple. Normal carotid pulses and no JVD present. Carotid bruit is not present. No thyromegaly present.   Cardiovascular: Normal rate, regular rhythm and intact distal pulses. PMI is not displaced. Exam reveals no gallop and no friction rub.   Murmur heard.   Crescendo decrescendo systolic (harsh) murmur is present with a grade of 3/6.   No diastolic murmur is present.  Pulses:       Carotid pulses are 2+ on the right side, and 2+ on the left side.       Radial pulses are 2+ on the right side, and 2+ on the left side.        Dorsalis pedis pulses are 2+ on the right side, and 2+ on the left side.        Posterior tibial pulses are 2+ on the right side, and 2+ on the left side.   Edema:    RLE: none     LLE: none   Spider telangectasia:       RLE:  None      LLE: none   Varicosities:         RLE: none      LLE: none   Corona phlebectatica:      RLE:  None        LLE:  None   Cording:         RLE:  None     LLE: None    Pulmonary/Chest: Effort normal and breath sounds normal. No respiratory distress.   Abdominal: Soft. Bowel sounds are normal. He exhibits no distension and no mass. There is no hepatosplenomegaly. There is no tenderness. There is no rebound and no guarding.   Musculoskeletal: He exhibits no edema.   Lymphadenopathy:     He has no cervical adenopathy.   Neurological: He is alert and oriented to person, place, and time. No cranial nerve deficit. Coordination and gait normal.   Skin:  Skin is warm and dry. He is not diaphoretic. No cyanosis. No pallor. Nails show no clubbing.   Psychiatric: He has a normal mood and affect.     Lab Results   Component Value Date    CHOLSTRLTOT 114 05/01/2019    LDL 37 05/01/2019    HDL 64 05/01/2019    TRIGLYCERIDE 64 05/01/2019      Lab Results   Component Value Date    PROTHROMBTM 12.6 08/02/2019    INR 0.93 08/02/2019         Lab Results   Component Value Date    SODIUM 136 09/11/2019    POTASSIUM 4.3 09/11/2019    CHLORIDE 106 09/11/2019    CO2 25 09/11/2019    GLUCOSE 71 09/11/2019    BUN 11 09/11/2019    CREATININE 0.88 09/11/2019    IFAFRICA >60 09/11/2019    IFNOTAFR >60 09/11/2019        Lab Results   Component Value Date    WBC 5.5 09/11/2019    RBC 2.85 (L) 09/11/2019    HEMOGLOBIN 6.9 (L) 09/11/2019    HEMATOCRIT 23.6 (L) 09/11/2019    MCV 82.8 09/11/2019    MCH 24.2 (L) 09/11/2019    MCHC 29.2 (L) 09/11/2019    MPV 9.3 09/11/2019      VASCULAR IMAGING:     Echo 12/21/18  No prior study is available for comparison.   Normal left ventricular systolic function.  Left ventricular ejection fraction is visually estimated to be 60%.  Normal diastolic function.  Mildly dilated right ventricle. Normal right ventricular systolic   function.  Normal inferior vena cava size without inspiratory collapse.  Aortic valve is not well visualized. probably fused right and left   cusps. Moderate aortic stenosis. Transvalvular gradients are - Peak: 54   mmHg, Mean: 38 mmHg.   Estimated right ventricular systolic pressure  is 40 mmHg.  Dilated aortic root measuring 3.9 cm    Echo 8/29/19  Prior echo 12/21/18, no significant changes are noted.  Left ventricular ejection fraction is visually estimated to be 65%.  Moderate aortic stenosis.  Unable to estimate pulmonary artery pressure due to an inadequate   tricuspid regurgitant jet.  Aorta  Ascending aorta diameter is 3.9 cm.    Medical Decision Making:  Today's Assessment / Status / Plan:     1. Aortic root dilation (HCC)       3.9cm   2. MONICA (obstructive sleep apnea)     3. Localized edema     4. Iron deficiency anemia, unspecified iron deficiency anemia type     5. Adult BMI 33.0-33.9 kg/sq m     6. Aortic stenosis, moderate       Patient Type: Primary Prevention and RA     Etiology of Established CVD if Present: n/a   1) RA is consider risk equivalent   2) Ao stenosis, moderate  3) Ao root dilatation    Lipid Management: Qualifies for Statin Therapy Based on 2013 ACC/AHA Guidelines: no  Calculated 10-Year Risk of ASCVD: <7.5%   Currently on Statin: No  NLA Risk Category:   Moderate: RA as risk equivalent   Tx threshold:  non-HDL-C >159, LDL-C >129  Tx goal:  non-HDL <130, LDL-C <100 (optional: apoB<90)   At goal:  yes  Tx plan:  - consider additional testing:  CAC>299, hsCRP>1.9, Lp(a)>49, ACR>29  - continue TLC measures, update labs     Blood Pressure Management:ACC/AHA (2017) goal <130/80  Home BP at goal:  yes  Office BP at goal:  Yes  Afterload reduction with ARB and/or BB appropriate to reduce Ao root dilatation progression.    Plan:   - continue home BP monitoring, reviewed correct technique:  Yes   - order 24h ABPM:  NO  - monitor lytes/gfr routinely   - start losartan 25mg QHS     Glycemic Status: Normal    Anti-Platelet/Anti-Coagulant Tx: no    Smoking: continued complete avoidance of all tobacco products     Physical Activity: continue healthy activity to improve CV fitness, see care instructions for additional details     Weight Management and Nutrition: Dietary plan was discussed with patient at this visit including DASH, low sodium and/or as outlined in care instructions       Other:   1) Aortic root dilatation, 3.9cm, asymptomatic.  Appears to be sporadic but echo findings my indicate bicuspid AoV.  Will need to repeat study.  No body habitus of note for marfans or EDS.  No other in family affected.    - continue losartan 25mg   - repeat echo in 1 year, then Q2yr if stable     2) Moderate Ao Stenosis with murmur. No  symptoms.   - consider cardiology involvement if worsening over time   - concern for possible Heyde's syndrome due to ongoing anemia and Ao stenosis.  May have angiodysplasias and may need further endoscopy.    3) palpitations - check Zio patch for 2 weeks     4) MONICA   - ref back to sleep medicine to re-establish and start CPAP to reduce heart strain     5) RA - on biologics, consider CVD risk equivalent and major risk factor. Continue humira  - defer mgmt to rheumatology.      6) BLE, left > right edema, pitting.  Unclear etiology.  Reports steroids helped to reduce swelling and not worsen.    - labs and echo    7) anemia, microcytic.  mentzer index = 23, indicating likely iron-def.  Has lowered to 6.9 from 9.1 over last 1 month.  Currently hemodynamically stable, but may have active bleeding that needs further assessment.   Would consider further GI assessment for angiodysplasias possible Heyde's syndrome due to recurrent anemia, Ao stensosis.  Would require vWF assessment and further labs w/u.     - recommend transfer to ED for further assessment for pRBC transfusions, consider iron infusions, pt declines and left AMA reporting that he has the only car and his family needs his assistance, he will return to ED tomorrow.  He is deemed hemodynamically stable for discharge and the chronicity of the anemia w/o signs of active, acute blood loss.   - defer ongoing mgmt to ED, GI, PCP    8) hypoalbuminuria, low Ca2+ related to this.  Could be associated to edema development. May be absorption issues.   - increase protein intake     Instructed to follow-up with PCP for remainder of adult medical needs: yes  We will partner with other providers in the management of established vascular disease and cardiometabolic risk factors.    Studies to Be Obtained: Zio patch - still pending  Labs to Be Obtained:  Cbc, retic, ferritin, iron/tibc - still pending     Follow up in:  ED then PCP,  With vasc med in 6-12mo     Wojciech ESCAMILLA  CRISTELA Silva.

## 2019-09-12 NOTE — OR NURSING
Since the patient’s cardiologist has seen him so recently, maybe he/she could accommodate usby giving us a note that will quantify the aortic stenosis and state whether he is optimized or not. Otherwise a primary care note for clearance is required given this history. Thank you.  Bonny Gonzalez M.D.  [facility]    On Sep 12, 2019, at 16:21, [email or web address] wrote:   Claudia Gonzalez, this pt is having a total knee with Dr. Torrez, on 9/16.  He has aortic stenosis, has had a recent echo,(8/15/19)  his last cardiology notes are in [facility] under chart review (correspondence). Patient told me he had a cardiac clearance, but apparently he doesn’t, pt did see Dr. Silva today, so maybe he would be willing to give him a clearance.  He does have sleep apnea, with no cpap, couldn’t tolerate it.  Anesthesia Summary Report            Patient Name: Richard Hubbard II MRN: [id number] Admission Date: Patient not admitted   Allergies: Benadryl [Altaryl], Nsaids, Phenergan [Promethazine Hcl], Penicillins

## 2019-09-13 ENCOUNTER — HOSPITAL ENCOUNTER (OUTPATIENT)
Facility: MEDICAL CENTER | Age: 44
End: 2019-09-15
Attending: EMERGENCY MEDICINE | Admitting: HOSPITALIST
Payer: COMMERCIAL

## 2019-09-13 DIAGNOSIS — D64.9 ANEMIA, UNSPECIFIED TYPE: ICD-10-CM

## 2019-09-13 PROBLEM — I10 ESSENTIAL HYPERTENSION: Status: ACTIVE | Noted: 2019-09-13

## 2019-09-13 PROBLEM — R74.8 ELEVATED ALKALINE PHOSPHATASE LEVEL: Status: ACTIVE | Noted: 2019-09-13

## 2019-09-13 PROBLEM — I77.810 AORTIC ROOT DILATATION (HCC): Status: ACTIVE | Noted: 2019-09-13

## 2019-09-13 LAB
ABO GROUP BLD: NORMAL
ALBUMIN SERPL BCP-MCNC: 2.8 G/DL (ref 3.2–4.9)
ALBUMIN/GLOB SERPL: 1.1 G/DL
ALP SERPL-CCNC: 188 U/L (ref 30–99)
ALT SERPL-CCNC: 11 U/L (ref 2–50)
ANION GAP SERPL CALC-SCNC: 10 MMOL/L (ref 0–11.9)
APTT PPP: 27 SEC (ref 24.7–36)
AST SERPL-CCNC: 14 U/L (ref 12–45)
BARCODED ABORH UBTYP: 5100
BARCODED ABORH UBTYP: 5100
BARCODED PRD CODE UBPRD: NORMAL
BARCODED PRD CODE UBPRD: NORMAL
BARCODED UNIT NUM UBUNT: NORMAL
BARCODED UNIT NUM UBUNT: NORMAL
BASOPHILS # BLD AUTO: 0.8 % (ref 0–1.8)
BASOPHILS # BLD: 0.05 K/UL (ref 0–0.12)
BILIRUB SERPL-MCNC: 0.1 MG/DL (ref 0.1–1.5)
BLD GP AB SCN SERPL QL: NORMAL
BUN SERPL-MCNC: 10 MG/DL (ref 8–22)
CALCIUM SERPL-MCNC: 7.8 MG/DL (ref 8.4–10.2)
CHLORIDE SERPL-SCNC: 107 MMOL/L (ref 96–112)
CO2 SERPL-SCNC: 23 MMOL/L (ref 20–33)
COMPONENT R 8504R: NORMAL
COMPONENT R 8504R: NORMAL
CREAT SERPL-MCNC: 0.78 MG/DL (ref 0.5–1.4)
EOSINOPHIL # BLD AUTO: 0.1 K/UL (ref 0–0.51)
EOSINOPHIL NFR BLD: 1.6 % (ref 0–6.9)
ERYTHROCYTE [DISTWIDTH] IN BLOOD BY AUTOMATED COUNT: 47.4 FL (ref 35.9–50)
ERYTHROCYTE [DISTWIDTH] IN BLOOD BY AUTOMATED COUNT: 48.5 FL (ref 35.9–50)
GLOBULIN SER CALC-MCNC: 2.6 G/DL (ref 1.9–3.5)
GLUCOSE SERPL-MCNC: 73 MG/DL (ref 65–99)
HCT VFR BLD AUTO: 24.2 % (ref 42–52)
HCT VFR BLD AUTO: 25.4 % (ref 42–52)
HGB BLD-MCNC: 7 G/DL (ref 14–18)
HGB BLD-MCNC: 7.6 G/DL (ref 14–18)
HGB RETIC QN AUTO: 24.5 PG/CELL (ref 29–35)
HYPOCHROMIA BLD QL SMEAR: NORMAL
IMM GRANULOCYTES # BLD AUTO: 0.03 K/UL (ref 0–0.11)
IMM GRANULOCYTES NFR BLD AUTO: 0.5 % (ref 0–0.9)
IMM RETICS NFR: 15.9 % (ref 9.3–17.4)
INR PPP: 1.01 (ref 0.87–1.13)
IRON SATN MFR SERPL: 4 % (ref 15–55)
IRON SERPL-MCNC: 13 UG/DL (ref 50–180)
LYMPHOCYTES # BLD AUTO: 1.46 K/UL (ref 1–4.8)
LYMPHOCYTES NFR BLD: 23.1 % (ref 22–41)
MCH RBC QN AUTO: 23.7 PG (ref 27–33)
MCH RBC QN AUTO: 24.6 PG (ref 27–33)
MCHC RBC AUTO-ENTMCNC: 28.9 G/DL (ref 33.7–35.3)
MCHC RBC AUTO-ENTMCNC: 29.9 G/DL (ref 33.7–35.3)
MCV RBC AUTO: 82 FL (ref 81.4–97.8)
MCV RBC AUTO: 82.2 FL (ref 81.4–97.8)
MICROCYTES BLD QL SMEAR: NORMAL
MONOCYTES # BLD AUTO: 0.56 K/UL (ref 0–0.85)
MONOCYTES NFR BLD AUTO: 8.8 % (ref 0–13.4)
NEUTROPHILS # BLD AUTO: 4.13 K/UL (ref 1.82–7.42)
NEUTROPHILS NFR BLD: 65.2 % (ref 44–72)
NRBC # BLD AUTO: 0 K/UL
NRBC BLD-RTO: 0 /100 WBC
OVALOCYTES BLD QL SMEAR: NORMAL
PLATELET # BLD AUTO: 270 K/UL (ref 164–446)
PLATELET # BLD AUTO: 274 K/UL (ref 164–446)
PLATELET BLD QL SMEAR: NORMAL
PMV BLD AUTO: 8.4 FL (ref 9–12.9)
PMV BLD AUTO: 8.6 FL (ref 9–12.9)
POIKILOCYTOSIS BLD QL SMEAR: NORMAL
POLYCHROMASIA BLD QL SMEAR: NORMAL
POTASSIUM SERPL-SCNC: 3.5 MMOL/L (ref 3.6–5.5)
PRODUCT TYPE UPROD: NORMAL
PRODUCT TYPE UPROD: NORMAL
PROT SERPL-MCNC: 5.4 G/DL (ref 6–8.2)
PROTHROMBIN TIME: 13.4 SEC (ref 12–14.6)
RBC # BLD AUTO: 2.95 M/UL (ref 4.7–6.1)
RBC # BLD AUTO: 3.09 M/UL (ref 4.7–6.1)
RBC BLD AUTO: PRESENT
RETICS # AUTO: 0.05 M/UL (ref 0.04–0.06)
RETICS/RBC NFR: 1.8 % (ref 0.8–2.1)
RH BLD: NORMAL
SCHISTOCYTES BLD QL SMEAR: NORMAL
SODIUM SERPL-SCNC: 140 MMOL/L (ref 135–145)
TIBC SERPL-MCNC: 358 UG/DL (ref 250–450)
UNIT STATUS USTAT: NORMAL
UNIT STATUS USTAT: NORMAL
WBC # BLD AUTO: 6.3 K/UL (ref 4.8–10.8)
WBC # BLD AUTO: 6.8 K/UL (ref 4.8–10.8)

## 2019-09-13 PROCEDURE — 85027 COMPLETE CBC AUTOMATED: CPT

## 2019-09-13 PROCEDURE — 86901 BLOOD TYPING SEROLOGIC RH(D): CPT

## 2019-09-13 PROCEDURE — A9270 NON-COVERED ITEM OR SERVICE: HCPCS

## 2019-09-13 PROCEDURE — 86850 RBC ANTIBODY SCREEN: CPT

## 2019-09-13 PROCEDURE — 85025 COMPLETE CBC W/AUTO DIFF WBC: CPT

## 2019-09-13 PROCEDURE — 700102 HCHG RX REV CODE 250 W/ 637 OVERRIDE(OP): Performed by: EMERGENCY MEDICINE

## 2019-09-13 PROCEDURE — A9270 NON-COVERED ITEM OR SERVICE: HCPCS | Performed by: HOSPITALIST

## 2019-09-13 PROCEDURE — 85610 PROTHROMBIN TIME: CPT

## 2019-09-13 PROCEDURE — P9016 RBC LEUKOCYTES REDUCED: HCPCS

## 2019-09-13 PROCEDURE — 700102 HCHG RX REV CODE 250 W/ 637 OVERRIDE(OP)

## 2019-09-13 PROCEDURE — 86900 BLOOD TYPING SEROLOGIC ABO: CPT

## 2019-09-13 PROCEDURE — 83540 ASSAY OF IRON: CPT

## 2019-09-13 PROCEDURE — 700102 HCHG RX REV CODE 250 W/ 637 OVERRIDE(OP): Performed by: HOSPITALIST

## 2019-09-13 PROCEDURE — G0378 HOSPITAL OBSERVATION PER HR: HCPCS

## 2019-09-13 PROCEDURE — 83550 IRON BINDING TEST: CPT

## 2019-09-13 PROCEDURE — 36430 TRANSFUSION BLD/BLD COMPNT: CPT

## 2019-09-13 PROCEDURE — 99220 PR INITIAL OBSERVATION CARE,LEVL III: CPT | Performed by: HOSPITALIST

## 2019-09-13 PROCEDURE — 85730 THROMBOPLASTIN TIME PARTIAL: CPT

## 2019-09-13 PROCEDURE — 80053 COMPREHEN METABOLIC PANEL: CPT

## 2019-09-13 PROCEDURE — 85046 RETICYTE/HGB CONCENTRATE: CPT

## 2019-09-13 PROCEDURE — 86923 COMPATIBILITY TEST ELECTRIC: CPT

## 2019-09-13 PROCEDURE — 99285 EMERGENCY DEPT VISIT HI MDM: CPT

## 2019-09-13 PROCEDURE — 36415 COLL VENOUS BLD VENIPUNCTURE: CPT

## 2019-09-13 PROCEDURE — A9270 NON-COVERED ITEM OR SERVICE: HCPCS | Performed by: EMERGENCY MEDICINE

## 2019-09-13 RX ORDER — FUROSEMIDE 20 MG/1
20 TABLET ORAL
Status: DISCONTINUED | OUTPATIENT
Start: 2019-09-13 | End: 2019-09-15 | Stop reason: HOSPADM

## 2019-09-13 RX ORDER — ACETAMINOPHEN 500 MG
TABLET ORAL
Status: COMPLETED
Start: 2019-09-13 | End: 2019-09-13

## 2019-09-13 RX ORDER — METHOCARBAMOL 500 MG/1
750 TABLET, FILM COATED ORAL 4 TIMES DAILY PRN
Status: DISCONTINUED | OUTPATIENT
Start: 2019-09-13 | End: 2019-09-15 | Stop reason: HOSPADM

## 2019-09-13 RX ORDER — LOSARTAN POTASSIUM 25 MG/1
25 TABLET ORAL DAILY
Status: DISCONTINUED | OUTPATIENT
Start: 2019-09-14 | End: 2019-09-15 | Stop reason: HOSPADM

## 2019-09-13 RX ORDER — OXYCODONE HYDROCHLORIDE AND ACETAMINOPHEN 5; 325 MG/1; MG/1
1 TABLET ORAL EVERY 6 HOURS PRN
Status: DISCONTINUED | OUTPATIENT
Start: 2019-09-13 | End: 2019-09-14

## 2019-09-13 RX ORDER — ACETAMINOPHEN 500 MG
1000 TABLET ORAL EVERY 6 HOURS PRN
Status: DISCONTINUED | OUTPATIENT
Start: 2019-09-13 | End: 2019-09-15 | Stop reason: HOSPADM

## 2019-09-13 RX ORDER — ACETAMINOPHEN 500 MG
1000 TABLET ORAL EVERY 6 HOURS PRN
COMMUNITY
End: 2019-09-19

## 2019-09-13 RX ORDER — METHOCARBAMOL 500 MG/1
TABLET, FILM COATED ORAL
Status: COMPLETED
Start: 2019-09-13 | End: 2019-09-13

## 2019-09-13 RX ORDER — FERROUS SULFATE 325(65) MG
325 TABLET ORAL DAILY
Status: DISCONTINUED | OUTPATIENT
Start: 2019-09-14 | End: 2019-09-15 | Stop reason: HOSPADM

## 2019-09-13 RX ORDER — OMEPRAZOLE 20 MG/1
20 CAPSULE, DELAYED RELEASE ORAL 2 TIMES DAILY
Status: DISCONTINUED | OUTPATIENT
Start: 2019-09-13 | End: 2019-09-14

## 2019-09-13 RX ORDER — OXYCODONE HYDROCHLORIDE 5 MG/1
5 TABLET ORAL ONCE
Status: COMPLETED | OUTPATIENT
Start: 2019-09-13 | End: 2019-09-13

## 2019-09-13 RX ORDER — PREDNISONE 10 MG/1
5-30 TABLET ORAL DAILY
Status: ON HOLD | COMMUNITY
Start: 2019-08-17 | End: 2019-10-17

## 2019-09-13 RX ADMIN — ACETAMINOPHEN 1000 MG: 500 TABLET, FILM COATED ORAL at 17:45

## 2019-09-13 RX ADMIN — OXYCODONE HYDROCHLORIDE 5 MG: 5 TABLET ORAL at 17:44

## 2019-09-13 RX ADMIN — OXYCODONE HYDROCHLORIDE AND ACETAMINOPHEN 1 TABLET: 5; 325 TABLET ORAL at 21:56

## 2019-09-13 RX ADMIN — METHOCARBAMOL 750 MG: 500 TABLET, FILM COATED ORAL at 17:44

## 2019-09-13 ASSESSMENT — ENCOUNTER SYMPTOMS
CONSTIPATION: 0
SORE THROAT: 0
MEMORY LOSS: 0
MYALGIAS: 0
HEADACHES: 0
DOUBLE VISION: 0
ORTHOPNEA: 0
SENSORY CHANGE: 0
EYE PAIN: 0
BLOOD IN STOOL: 0
FEVER: 0
BLURRED VISION: 0
CLAUDICATION: 0
HEMOPTYSIS: 0
PND: 0
NAUSEA: 0
STRIDOR: 0
PHOTOPHOBIA: 0
DIZZINESS: 0
COUGH: 0
SHORTNESS OF BREATH: 0
DEPRESSION: 0
TREMORS: 0
SPUTUM PRODUCTION: 0
TINGLING: 0
CHILLS: 0
HEARTBURN: 0
WEAKNESS: 0
NECK PAIN: 0
SPEECH CHANGE: 0
PALPITATIONS: 0
VOMITING: 0
BACK PAIN: 0
NERVOUS/ANXIOUS: 0

## 2019-09-13 ASSESSMENT — PATIENT HEALTH QUESTIONNAIRE - PHQ9
2. FEELING DOWN, DEPRESSED, IRRITABLE, OR HOPELESS: NOT AT ALL
1. LITTLE INTEREST OR PLEASURE IN DOING THINGS: NOT AT ALL
SUM OF ALL RESPONSES TO PHQ9 QUESTIONS 1 AND 2: 0

## 2019-09-13 NOTE — OR NURSING
Merissa from Dr. Torrez's office called and said cardiology office -Dr. Silva was not going to give cardiac clearance, and will be faxing note to Dr. Torrez's

## 2019-09-13 NOTE — ED NOTES
Med rec complete per patient's medication bottles (returned)  Allergies reviewed  No PO antibiotics in last 14 days    Patient was on a Prednisone taper  But did not complete course    Patient did have an rx for Potassium but didn't get from pharmacy

## 2019-09-13 NOTE — ED PROVIDER NOTES
ED Provider Note    CHIEF COMPLAINT  Chief Complaint   Patient presents with   • Abnormal Labs     sent here by doctor for low hemoglobin         HPI  Richard Hubbard II is a 44 y.o. male who presents to the ED secondary to anemia.  Patient has a history of aortic root dilatation and moderate aortic stenosis, he also has a history of bleeding ulcers, last scoped on August 27 by [facility] that showed a bleeding ulcer that was apparently cauterized.  The patient has a history of rheumatoid arthritis, scheduled for a total knee replacement on September 16.  Patient had blood tests 2 days ago that showed a hemoglobin of 6.9, saw his vascular doctor yesterday, who recommended the patient come to the emergency department.  Patient did not want to come at that time, he is here now.  The patient states he feels slightly short of breath and slightly dizzy.  The patient denies any abdominal pains, denies any black or bloody bowel movements.  Denies any chest pains.    REVIEW OF SYSTEMS  See HPI for further details. All other systems are negative.     PAST MEDICAL HISTORY  Past Medical History:   Diagnosis Date   • ADD (attention deficit disorder)    • Alcohol abuse    • Allergic rhinitis 5/21/2009   • Anemia 09/2019   • Anxiety 5/21/2009   • Back pain 5/21/2009   • Bipolar disorder (HCC)    • Bleeding ulcer 5/21/2009   • Depression 5/21/2009   • Eczema 5/21/2009   • History of bleeding ulcers 2/12/2015   • Hypertension    • Insomnia 5/21/2009   • Migraine 5/21/2009   • Obesity, morbid (HCC) 5/21/2009   • Pain 09/2019    Chronic knee and back pain   • Rheumatoid arteritis 09/2019    knee, feet   • Sleep apnea 5/21/2009    Did not tolerate cpap, does not use it   • Snoring        FAMILY HISTORY  Family History   Problem Relation Age of Onset   • Hypertension Mother    • Arthritis Mother    • Depression Mother    • Cancer Father         bladder   • Schizophrenia Father    • Cancer Maternal Grandmother         breast    • Heart Disease Maternal Grandmother    • Psychiatric Illness Other    • Stroke Maternal Aunt    • Other Neg Hx         no connective tissue disorders or known aortic disease       SOCIAL HISTORY  Social History     Socioeconomic History   • Marital status:      Spouse name: Not on file   • Number of children: Not on file   • Years of education: Not on file   • Highest education level: Not on file   Occupational History   • 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   Social Needs   • Financial resource strain: Not on file   • Food insecurity:     Worry: Not on file     Inability: Not on file   • Transportation needs:     Medical: Not on file     Non-medical: Not on file   Tobacco Use   • Smoking status: Current Every Day Smoker     Packs/day: 0.25     Years: 3.00     Pack years: 0.75     Types: Cigarettes, Cigars   • Smokeless tobacco: Never Used   • Tobacco comment: occasional cigar   Substance and Sexual Activity   • Alcohol use: No     Alcohol/week: 0.0 oz     Comment: quit 2.5 yrs ago   • Drug use: No   • Sexual activity: Yes     Partners: Female     Comment: ;    Lifestyle   • Physical activity:     Days per week: Not on file     Minutes per session: Not on file   • Stress: Not on file   Relationships   • Social connections:     Talks on phone: Not on file     Gets together: Not on file     Attends Alevism service: Not on file     Active member of club or organization: Not on file     Attends meetings of clubs or organizations: Not on file     Relationship status: Not on file   • Intimate partner violence:     Fear of current or ex partner: Not on file     Emotionally abused: Not on file     Physically abused: Not on file     Forced sexual activity: Not on file   Other Topics Concern   • Not on file   Social History Narrative    , lives in [place]       SURGICAL HISTORY  Past Surgical History:   Procedure Laterality Date   • GASTRIC BYPASS LAPAROSCOPIC  2000    Lucila en y   • CHOLECYSTECTOMY  2000  "      CURRENT MEDICATIONS  Home Medications     Reviewed by Gael Resendiz (Pharmacy Tech) on 09/13/19 at 1616  Med List Status: Complete   Medication Last Dose Status   acetaminophen (TYLENOL) 500 MG Tab 9/12/2019 Active   Adalimumab (HUMIRA) 40 MG/0.4ML Prefilled Syringe Kit 9/2/2019 Active   ferrous sulfate 325 (65 Fe) MG tablet 9/13/2019 Active   furosemide (LASIX) 20 MG Tab 4 DAYS Active   losartan (COZAAR) 25 MG Tab 9/13/2019 Active   methocarbamol (ROBAXIN) 750 MG Tab 9/13/2019 Active   omeprazole (PRILOSEC) 20 MG delayed-release capsule 9/13/2019 Active   oxyCODONE-acetaminophen (PERCOCET) 5-325 MG Tab 9/13/2019 Active   predniSONE (DELTASONE) 10 MG Tab 2 WEEKS Active                ALLERGIES  Allergies   Allergen Reactions   • Benadryl [Altaryl]      \"I get agitated\"   • Nsaids      Bleeding, \"I had a bleeding ulcer\"   • Phenergan [Promethazine Hcl]      \"I get very agitated\"   • Penicillins      \"Had some dizziness\"       PHYSICAL EXAM  VITAL SIGNS: /60   Pulse 78   Temp 36.9 °C (98.4 °F) (Temporal)   Resp 18   Ht 1.803 m (5' 11\")   Wt 106.2 kg (234 lb 2.1 oz)   SpO2 97%   BMI 32.65 kg/m²   Constitutional: Well developed, Well nourished, mild distress, Non-toxic appearance.   HENT: Normocephalic, Atraumatic, Bilateral external ears normal, Oropharynx there, No oral exudates, Nose normal.   Eyes: PERRLA, EOMI, Conjunctiva normal, No discharge.   Neck: Normal range of motion, No tenderness, Supple, No stridor.   Lymphatic: No lymphadenopathy noted.   Cardiovascular: Normal heart rate, Normal rhythm.   Thorax & Lungs: Normal breath sounds, No respiratory distress, No wheezing, No chest tenderness.   Abdomen: Soft, nontender, no rebound, no peritoneal signs  Skin: Pale in appearance back: No tenderness, No CVA tenderness.   Extremities: Intact distal pulses, No edema, No tenderness.   Neurologic: Alert & oriented x 3, moves all extremities spontaneously.     COURSE & MEDICAL DECISION " MAKING  Pertinent Labs & Imaging studies reviewed. (See chart for details)  Patient with anemia, likely iron deficiency versus GI source, I did not perform a rectal examination because the patient is in the hallway.  I have typed and crossed the patient, will transfuse the patient due to the patient's symptoms and anemia.  Discussed the case with the hospitalist for admission the hospital.      FINAL IMPRESSION  1. Anemia, unspecified type            This dictation was created using voice recognition software. The accuracy of the dictation is limited to the abilities of the software. I expect there may be some errors of grammar and possibly content. The nursing notes were reviewed and certain aspects of this information were incorporated into this note.    Electronically signed by: Mario Alberto Mcallister, 9/13/2019 4:51 PM

## 2019-09-14 ENCOUNTER — PATIENT OUTREACH (OUTPATIENT)
Dept: HEALTH INFORMATION MANAGEMENT | Facility: OTHER | Age: 44
End: 2019-09-14

## 2019-09-14 LAB
HCT VFR BLD AUTO: 24.3 % (ref 42–52)
HGB BLD-MCNC: 7.3 G/DL (ref 14–18)

## 2019-09-14 PROCEDURE — C9113 INJ PANTOPRAZOLE SODIUM, VIA: HCPCS | Performed by: INTERNAL MEDICINE

## 2019-09-14 PROCEDURE — A9270 NON-COVERED ITEM OR SERVICE: HCPCS | Performed by: HOSPITALIST

## 2019-09-14 PROCEDURE — 96366 THER/PROPH/DIAG IV INF ADDON: CPT

## 2019-09-14 PROCEDURE — 700105 HCHG RX REV CODE 258: Performed by: INTERNAL MEDICINE

## 2019-09-14 PROCEDURE — 86923 COMPATIBILITY TEST ELECTRIC: CPT

## 2019-09-14 PROCEDURE — 96365 THER/PROPH/DIAG IV INF INIT: CPT

## 2019-09-14 PROCEDURE — 85014 HEMATOCRIT: CPT

## 2019-09-14 PROCEDURE — A9270 NON-COVERED ITEM OR SERVICE: HCPCS | Performed by: INTERNAL MEDICINE

## 2019-09-14 PROCEDURE — 36415 COLL VENOUS BLD VENIPUNCTURE: CPT

## 2019-09-14 PROCEDURE — 96368 THER/DIAG CONCURRENT INF: CPT

## 2019-09-14 PROCEDURE — 99226 PR SUBSEQUENT OBSERVATION CARE,LEVEL III: CPT | Performed by: INTERNAL MEDICINE

## 2019-09-14 PROCEDURE — 36430 TRANSFUSION BLD/BLD COMPNT: CPT

## 2019-09-14 PROCEDURE — 700102 HCHG RX REV CODE 250 W/ 637 OVERRIDE(OP): Performed by: HOSPITALIST

## 2019-09-14 PROCEDURE — P9016 RBC LEUKOCYTES REDUCED: HCPCS

## 2019-09-14 PROCEDURE — 700111 HCHG RX REV CODE 636 W/ 250 OVERRIDE (IP): Performed by: INTERNAL MEDICINE

## 2019-09-14 PROCEDURE — 82270 OCCULT BLOOD FECES: CPT

## 2019-09-14 PROCEDURE — 700102 HCHG RX REV CODE 250 W/ 637 OVERRIDE(OP): Performed by: INTERNAL MEDICINE

## 2019-09-14 PROCEDURE — 85018 HEMOGLOBIN: CPT

## 2019-09-14 PROCEDURE — G0378 HOSPITAL OBSERVATION PER HR: HCPCS

## 2019-09-14 RX ORDER — OXYCODONE HCL 10 MG/1
10 TABLET, FILM COATED, EXTENDED RELEASE ORAL EVERY 12 HOURS
Status: DISCONTINUED | OUTPATIENT
Start: 2019-09-14 | End: 2019-09-15 | Stop reason: HOSPADM

## 2019-09-14 RX ORDER — ACETAMINOPHEN 325 MG/1
650 TABLET ORAL ONCE
Status: COMPLETED | OUTPATIENT
Start: 2019-09-14 | End: 2019-09-14

## 2019-09-14 RX ORDER — DIPHENHYDRAMINE HYDROCHLORIDE 50 MG/ML
25 INJECTION INTRAMUSCULAR; INTRAVENOUS ONCE
Status: DISCONTINUED | OUTPATIENT
Start: 2019-09-14 | End: 2019-09-15 | Stop reason: HOSPADM

## 2019-09-14 RX ORDER — POTASSIUM CHLORIDE 20 MEQ/1
40 TABLET, EXTENDED RELEASE ORAL ONCE
Status: COMPLETED | OUTPATIENT
Start: 2019-09-14 | End: 2019-09-14

## 2019-09-14 RX ORDER — DIPHENHYDRAMINE HCL 25 MG
25 TABLET ORAL ONCE
Status: DISCONTINUED | OUTPATIENT
Start: 2019-09-14 | End: 2019-09-15 | Stop reason: HOSPADM

## 2019-09-14 RX ORDER — OXYCODONE HYDROCHLORIDE 10 MG/1
10 TABLET ORAL EVERY 4 HOURS PRN
Status: DISCONTINUED | OUTPATIENT
Start: 2019-09-14 | End: 2019-09-15 | Stop reason: HOSPADM

## 2019-09-14 RX ORDER — TRAMADOL HYDROCHLORIDE 50 MG/1
100 TABLET ORAL EVERY 4 HOURS PRN
Status: DISCONTINUED | OUTPATIENT
Start: 2019-09-14 | End: 2019-09-15 | Stop reason: HOSPADM

## 2019-09-14 RX ADMIN — SODIUM CHLORIDE 25 MG: 9 INJECTION, SOLUTION INTRAVENOUS at 22:59

## 2019-09-14 RX ADMIN — FUROSEMIDE 20 MG: 20 TABLET ORAL at 18:07

## 2019-09-14 RX ADMIN — NICOTINE POLACRILEX 2 MG: 2 GUM, CHEWING BUCCAL at 16:14

## 2019-09-14 RX ADMIN — LOSARTAN POTASSIUM 25 MG: 25 TABLET, FILM COATED ORAL at 05:29

## 2019-09-14 RX ADMIN — OXYCODONE HYDROCHLORIDE AND ACETAMINOPHEN 1 TABLET: 5; 325 TABLET ORAL at 03:56

## 2019-09-14 RX ADMIN — NICOTINE POLACRILEX 2 MG: 2 GUM, CHEWING BUCCAL at 22:56

## 2019-09-14 RX ADMIN — SODIUM CHLORIDE 8 MG/HR: 9 INJECTION, SOLUTION INTRAVENOUS at 15:49

## 2019-09-14 RX ADMIN — OXYCODONE HYDROCHLORIDE 10 MG: 10 TABLET, FILM COATED, EXTENDED RELEASE ORAL at 18:07

## 2019-09-14 RX ADMIN — POTASSIUM CHLORIDE 40 MEQ: 1500 TABLET, EXTENDED RELEASE ORAL at 11:35

## 2019-09-14 RX ADMIN — OXYCODONE HYDROCHLORIDE AND ACETAMINOPHEN 1 TABLET: 5; 325 TABLET ORAL at 10:00

## 2019-09-14 RX ADMIN — METHOCARBAMOL 750 MG: 500 TABLET, FILM COATED ORAL at 10:00

## 2019-09-14 RX ADMIN — OMEPRAZOLE 20 MG: 20 CAPSULE, DELAYED RELEASE ORAL at 05:28

## 2019-09-14 RX ADMIN — OXYCODONE HYDROCHLORIDE 10 MG: 10 TABLET ORAL at 22:56

## 2019-09-14 RX ADMIN — OXYCODONE HYDROCHLORIDE 10 MG: 10 TABLET ORAL at 14:48

## 2019-09-14 RX ADMIN — FUROSEMIDE 20 MG: 20 TABLET ORAL at 05:30

## 2019-09-14 RX ADMIN — FERROUS SULFATE TAB 325 MG (65 MG ELEMENTAL FE) 325 MG: 325 (65 FE) TAB at 05:29

## 2019-09-14 RX ADMIN — ACETAMINOPHEN 1000 MG: 500 TABLET, FILM COATED ORAL at 13:47

## 2019-09-14 RX ADMIN — ACETAMINOPHEN 650 MG: 325 TABLET, FILM COATED ORAL at 21:52

## 2019-09-14 RX ADMIN — TRAMADOL HYDROCHLORIDE 100 MG: 50 TABLET, FILM COATED ORAL at 20:20

## 2019-09-14 ASSESSMENT — COGNITIVE AND FUNCTIONAL STATUS - GENERAL
HELP NEEDED FOR BATHING: A LOT
MOBILITY SCORE: 12
MOVING FROM LYING ON BACK TO SITTING ON SIDE OF FLAT BED: A LOT
PERSONAL GROOMING: A LOT
DRESSING REGULAR UPPER BODY CLOTHING: A LOT
SUGGESTED CMS G CODE MODIFIER DAILY ACTIVITY: CL
MOVING TO AND FROM BED TO CHAIR: A LOT
DAILY ACTIVITIY SCORE: 12
TOILETING: A LOT
STANDING UP FROM CHAIR USING ARMS: A LOT
WALKING IN HOSPITAL ROOM: A LOT
TURNING FROM BACK TO SIDE WHILE IN FLAT BAD: A LOT
DRESSING REGULAR LOWER BODY CLOTHING: A LOT
CLIMB 3 TO 5 STEPS WITH RAILING: A LOT
SUGGESTED CMS G CODE MODIFIER MOBILITY: CL
EATING MEALS: A LOT

## 2019-09-14 ASSESSMENT — ENCOUNTER SYMPTOMS
VOMITING: 0
NERVOUS/ANXIOUS: 1
DIZZINESS: 0
HEADACHES: 0
CHILLS: 0
SHORTNESS OF BREATH: 0
EYE DISCHARGE: 0
NAUSEA: 0
FEVER: 0
SORE THROAT: 0
EYE REDNESS: 0
ABDOMINAL PAIN: 0

## 2019-09-14 NOTE — CARE PLAN
Problem: Communication  Goal: The ability to communicate needs accurately and effectively will improve  Outcome: PROGRESSING AS EXPECTED     Problem: Safety  Goal: Will remain free from falls  Outcome: PROGRESSING AS EXPECTED     Problem: Knowledge Deficit  Goal: Knowledge of disease process/condition, treatment plan, diagnostic tests, and medications will improve  Outcome: PROGRESSING AS EXPECTED

## 2019-09-14 NOTE — H&P
Hospital Medicine History & Physical Note    Date of Service  9/13/2019    Primary Care Physician  JOSIANE Turner.    Consultants  None    Code Status  Full Code    Chief Complaint  Chief Complaint   Patient presents with   • Abnormal Labs     sent here by doctor for low hemoglobin       History of Presenting Illness  Stevie streeter a very pleasant 44 y.o. male with a past medical history of anemia, RA,  presented to the emergency room on 9/13/2019 for anemia and low hemoglobin.  Patient apparently recently underwent an EGD after having an upper GI bleed, and per wife who was in APRN said that they did not cauterize anything as the bleeding had stopped.  Patient does have a history of Lucila-en-Y bypass.  As a result of his GI bleeding he was started on a PPI.  Since then patient was in usual state of health, however his hemoglobin was noted to be in 6.9.  She is concerned that he has a right TKA scheduled for next week.  He is also been feeling weak and fatigued overall.  At this point he is here for a blood transfusion.  Otherwise denies chest pain shortness of breath or nausea vomiting.      Review of Systems  Review of Systems   Constitutional: Positive for malaise/fatigue. Negative for chills and fever.   HENT: Negative for congestion, hearing loss, sore throat and tinnitus.    Eyes: Negative for blurred vision, double vision, photophobia and pain.   Respiratory: Negative for cough, hemoptysis, sputum production, shortness of breath and stridor.    Cardiovascular: Negative for chest pain, palpitations, orthopnea, claudication and PND.   Gastrointestinal: Negative for blood in stool, constipation, heartburn, melena, nausea and vomiting.   Genitourinary: Negative for dysuria, frequency and urgency.   Musculoskeletal: Negative for back pain, myalgias and neck pain.   Neurological: Negative for dizziness, tingling, tremors, sensory change, speech change, weakness and headaches.  "  Psychiatric/Behavioral: Negative for depression, memory loss and suicidal ideas. The patient is not nervous/anxious.    All other systems reviewed and are negative.      Past Medical History  Past Medical History:   Diagnosis Date   • Rheumatoid arteritis 09/2019    knee, feet   • Anemia 09/2019   • Pain 09/2019    Chronic knee and back pain   • History of bleeding ulcers 2/12/2015   • Bleeding ulcer 5/21/2009   • Obesity, morbid (HCC) 5/21/2009   • Anxiety 5/21/2009   • Insomnia 5/21/2009   • Allergic rhinitis 5/21/2009   • Eczema 5/21/2009   • Depression 5/21/2009   • Back pain 5/21/2009   • Migraine 5/21/2009   • Sleep apnea 5/21/2009    Did not tolerate cpap, does not use it   • ADD (attention deficit disorder)    • Alcohol abuse    • Bipolar disorder (HCC)    • Hypertension    • Snoring        Surgical History   has a past surgical history that includes gastric bypass laparoscopic (2000) and cholecystectomy (2000).    Family History  family history includes Arthritis in his mother; Cancer in his father and maternal grandmother; Depression in his mother; Heart Disease in his maternal grandmother; Hypertension in his mother; Psychiatric Illness in an other family member; Schizophrenia in his father; Stroke in his maternal aunt.  Reviewed with patient and is non contributory    Social History   reports that he has been smoking cigarettes and cigars. He has a 0.75 pack-year smoking history. He has never used smokeless tobacco. He reports that he does not drink alcohol or use drugs.    Allergies  Allergies   Allergen Reactions   • Benadryl [Altaryl]      \"I get agitated\"   • Nsaids      Bleeding, \"I had a bleeding ulcer\"   • Phenergan [Promethazine Hcl]      \"I get very agitated\"   • Penicillins      \"Had some dizziness\"       Medications  Prior to Admission medications    Medication Sig Start Date End Date Taking? Authorizing Provider   predniSONE (DELTASONE) 10 MG Tab Take 5-30 mg by mouth every day. Take 30 mg " daily for 5 days, 20 mg daily for 5 days, 10 mg daily for 5 days, 5 mg daily 14 days 8/17/19  Yes Physician Outpatient   Adalimumab (HUMIRA) 40 MG/0.4ML Prefilled Syringe Kit Inject 40 mg as instructed every 14 days.   Yes Physician Outpatient   acetaminophen (TYLENOL) 500 MG Tab Take 1,000 mg by mouth every 6 hours as needed.   Yes Physician Outpatient   methocarbamol (ROBAXIN) 750 MG Tab Take 1 Tab by mouth 4 times a day as needed (pain). 9/9/19   Nani Resendiz P.A.-C.   oxyCODONE-acetaminophen (PERCOCET) 5-325 MG Tab Take 1 Tab by mouth every 6 hours as needed for Moderate Pain or Severe Pain for up to 5 days. 9/9/19 9/14/19  Nani Resendiz P.A.-C.   furosemide (LASIX) 20 MG Tab Take 1 Tab by mouth 2 times a day. 8/20/19   ARASH Garcia   omeprazole (PRILOSEC) 20 MG delayed-release capsule Take 20 mg by mouth 2 times a day.    Physician Outpatient   ferrous sulfate 325 (65 Fe) MG tablet Take 325 mg by mouth every day. 8/15/19   Wojciech Silva M.D.   losartan (COZAAR) 25 MG Tab Take 1 Tab by mouth every day for 360 days. 3/8/19 3/2/20  Wojciech Silva M.D.       Physical Exam  Temp:  [36.9 °C (98.4 °F)] 36.9 °C (98.4 °F)  Pulse:  [78] 78  Resp:  [18] 18  BP: (111)/(60) 111/60  SpO2:  [97 %] 97 %  Physical Exam   Constitutional: He is oriented to person, place, and time. He appears well-developed and well-nourished. No distress.   HENT:   Head: Normocephalic and atraumatic.   Mouth/Throat: No oropharyngeal exudate.   Eyes: Pupils are equal, round, and reactive to light. Conjunctivae are normal. Right eye exhibits no discharge. No scleral icterus.   Neck: Neck supple. No JVD present. No thyromegaly present.   Cardiovascular: Normal rate, regular rhythm and intact distal pulses.   No murmur heard.  Pulses:       Dorsalis pedis pulses are 2+ on the right side, and 2+ on the left side.   Cap refill < 3 s   Pulmonary/Chest: Effort normal and breath sounds normal. No stridor. No respiratory  distress. He has no wheezes. He has no rales.   Abdominal: Soft. Bowel sounds are normal. He exhibits no distension. There is no tenderness. There is no rebound.   Musculoskeletal: Normal range of motion. He exhibits no edema.   Neurological: He is alert and oriented to person, place, and time.   Skin: Skin is warm and dry. He is not diaphoretic. No erythema. There is pallor.   Psychiatric: He has a normal mood and affect. His behavior is normal. Thought content normal.   Nursing note and vitals reviewed.      Laboratory:  Recent Labs     09/11/19  1603 09/13/19  1600   WBC 5.5 6.3   RBC 2.85* 2.95*   HEMOGLOBIN 6.9* 7.0*   HEMATOCRIT 23.6* 24.2*   MCV 82.8 82.0   MCH 24.2* 23.7*   MCHC 29.2* 28.9*   RDW 49.2 48.5   PLATELETCT 304 274   MPV 9.3 8.4*     Recent Labs     09/11/19  1603 09/13/19  1600   SODIUM 136 140   POTASSIUM 4.3 3.5*   CHLORIDE 106 107   CO2 25 23   GLUCOSE 71 73   BUN 11 10   CREATININE 0.88 0.78   CALCIUM 8.0* 7.8*     Recent Labs     09/11/19  1603 09/13/19  1600   ALTSGPT  --  11   ASTSGOT  --  14   ALKPHOSPHAT  --  188*   TBILIRUBIN  --  0.1   GLUCOSE 71 73     Recent Labs     09/13/19  1600   APTT 27.0   INR 1.01           Urinalysis:          Imaging:  No orders to display       Assessment/Plan:  I anticipate this patient is appropriate for observation status at this time.    Elevated alkaline phosphatase level  Assessment & Plan  Unknown significance, denies abdominal pain  Repeat CMP in the am or as outpatient     Essential hypertension  Assessment & Plan  Stable,  We will resume home dose of losartan and Lasix    Aortic root dilatation (HCC)  Assessment & Plan  Hx of recent echocardiogram which showed root diameter 3.9cm, stable resume follow up.     Iron deficiency anemia, unspecified iron deficiency anemia type- (present on admission)  Assessment & Plan  Recently had EGD on 8/27 with GI constualnts, patient apparently had a bleeding ulcer which was cauterized.  Continue with ferrous  sulfate supplementation.  Currently hemoglobin is borderline 6.9-7.0  1 unit PRBC will be transfused    Obesity (BMI 35.0-39.9 without comorbidity) (HCC)- (present on admission)  Assessment & Plan  Body mass index is 32.65 kg/m².      Rheumatoid arthritis (HCC)- (present on admission)  Assessment & Plan  Worsening per patient,  Patient is on Humira, follows  Rheumatology      MONICA (obstructive sleep apnea)- (present on admission)  Assessment & Plan  Resume home nightly CPAP      VTE prophylaxis: Prophylaxis: SCDs

## 2019-09-14 NOTE — PROGRESS NOTES
Pt A&Ox4. Vss. Blood transfusion completed, no s/s of reaction. Pt c/o 6/10 pain in joints from arthritis at this time. Declines pain interventions at this time. Labs drawn awaiting results.     2115: CBC results received. Iron lab unable to be run at this time due to need to transport to [facility]. Paged MD for orders. Pt asking if he can discharge.      2130: Paged MD after no answer from first page. RN educated pt and family that discharge tonight was unlikely due to current labs and need to transport iron sample.     2200: Still no response from MD. Third page placed.     2230: MD called back. Orders are to continue monitoring. No discharge tonight.

## 2019-09-14 NOTE — ASSESSMENT & PLAN NOTE
Iron profoundly low  Iron dextran replacement ordered  He has acute blood loss anemia superimposed on chronic anemia  Hemoglobin did not come up as expected after transfusion and suspect he is continuing to bleed as he describes dark dark brown stools  He also admits to taking anti-inflammatories.  Not daily but not infrequently after his last EGD  Discussed with GI  Recommend transfuse additional unit, agree with PPI, no further NSAIDs.  No indication for acute endoscopy at this time

## 2019-09-14 NOTE — PROGRESS NOTES
Pt arrived to floor from ER. Assumed care of patient. Discussed daily plan of care, oriented patient to floor. VSS. Pt reports tolerable pain rated at 7/10, denies nausea. Hourly rounding in place. Blood currently infusing.    Pt awake and cooperative, A/Ox4, lungs clear, bowel sounds normoactive. No significant muscle weakness noted. Pt reports generalized pain r/t hx of arthritis, and reports difficulty walking. Pt able to void w/ urinal.    Pt denies any other needs at this time.

## 2019-09-15 VITALS
RESPIRATION RATE: 17 BRPM | DIASTOLIC BLOOD PRESSURE: 78 MMHG | OXYGEN SATURATION: 97 % | SYSTOLIC BLOOD PRESSURE: 123 MMHG | HEIGHT: 71 IN | BODY MASS INDEX: 32.78 KG/M2 | TEMPERATURE: 97.4 F | WEIGHT: 234.13 LBS | HEART RATE: 58 BPM

## 2019-09-15 LAB
BASOPHILS # BLD AUTO: 0.8 % (ref 0–1.8)
BASOPHILS # BLD: 0.05 K/UL (ref 0–0.12)
EOSINOPHIL # BLD AUTO: 0.11 K/UL (ref 0–0.51)
EOSINOPHIL NFR BLD: 1.7 % (ref 0–6.9)
ERYTHROCYTE [DISTWIDTH] IN BLOOD BY AUTOMATED COUNT: 45.3 FL (ref 35.9–50)
HCT VFR BLD AUTO: 27.7 % (ref 42–52)
HEMOCCULT SP1 STL QL: POSITIVE
HGB BLD-MCNC: 8.4 G/DL (ref 14–18)
IMM GRANULOCYTES # BLD AUTO: 0.02 K/UL (ref 0–0.11)
IMM GRANULOCYTES NFR BLD AUTO: 0.3 % (ref 0–0.9)
LYMPHOCYTES # BLD AUTO: 1.99 K/UL (ref 1–4.8)
LYMPHOCYTES NFR BLD: 31.2 % (ref 22–41)
MCH RBC QN AUTO: 24.5 PG (ref 27–33)
MCHC RBC AUTO-ENTMCNC: 30.3 G/DL (ref 33.7–35.3)
MCV RBC AUTO: 80.8 FL (ref 81.4–97.8)
MONOCYTES # BLD AUTO: 0.6 K/UL (ref 0–0.85)
MONOCYTES NFR BLD AUTO: 9.4 % (ref 0–13.4)
NEUTROPHILS # BLD AUTO: 3.61 K/UL (ref 1.82–7.42)
NEUTROPHILS NFR BLD: 56.6 % (ref 44–72)
NRBC # BLD AUTO: 0 K/UL
NRBC BLD-RTO: 0 /100 WBC
PLATELET # BLD AUTO: 257 K/UL (ref 164–446)
PMV BLD AUTO: 9.1 FL (ref 9–12.9)
RBC # BLD AUTO: 3.43 M/UL (ref 4.7–6.1)
WBC # BLD AUTO: 6.4 K/UL (ref 4.8–10.8)

## 2019-09-15 PROCEDURE — 36415 COLL VENOUS BLD VENIPUNCTURE: CPT

## 2019-09-15 PROCEDURE — C9113 INJ PANTOPRAZOLE SODIUM, VIA: HCPCS | Performed by: INTERNAL MEDICINE

## 2019-09-15 PROCEDURE — 700102 HCHG RX REV CODE 250 W/ 637 OVERRIDE(OP): Performed by: INTERNAL MEDICINE

## 2019-09-15 PROCEDURE — 700102 HCHG RX REV CODE 250 W/ 637 OVERRIDE(OP): Performed by: HOSPITALIST

## 2019-09-15 PROCEDURE — 96366 THER/PROPH/DIAG IV INF ADDON: CPT

## 2019-09-15 PROCEDURE — 700111 HCHG RX REV CODE 636 W/ 250 OVERRIDE (IP): Performed by: INTERNAL MEDICINE

## 2019-09-15 PROCEDURE — 700105 HCHG RX REV CODE 258: Performed by: INTERNAL MEDICINE

## 2019-09-15 PROCEDURE — A9270 NON-COVERED ITEM OR SERVICE: HCPCS | Performed by: INTERNAL MEDICINE

## 2019-09-15 PROCEDURE — 99217 PR OBSERVATION CARE DISCHARGE: CPT | Performed by: INTERNAL MEDICINE

## 2019-09-15 PROCEDURE — G0378 HOSPITAL OBSERVATION PER HR: HCPCS

## 2019-09-15 PROCEDURE — 85025 COMPLETE CBC W/AUTO DIFF WBC: CPT

## 2019-09-15 PROCEDURE — A9270 NON-COVERED ITEM OR SERVICE: HCPCS | Performed by: HOSPITALIST

## 2019-09-15 RX ORDER — SUCRALFATE 1 G/1
1 TABLET ORAL
Qty: 120 TAB | Refills: 0 | Status: ON HOLD | OUTPATIENT
Start: 2019-09-15 | End: 2019-10-17

## 2019-09-15 RX ORDER — FERROUS SULFATE 325(65) MG
325 TABLET ORAL DAILY
Qty: 30 TAB | Refills: 3 | Status: ON HOLD | OUTPATIENT
Start: 2019-09-15 | End: 2019-10-17

## 2019-09-15 RX ORDER — OMEPRAZOLE 20 MG/1
40 CAPSULE, DELAYED RELEASE ORAL 2 TIMES DAILY
Qty: 60 CAP | Refills: 3 | Status: ON HOLD | OUTPATIENT
Start: 2019-09-15 | End: 2019-10-17

## 2019-09-15 RX ADMIN — FERROUS SULFATE TAB 325 MG (65 MG ELEMENTAL FE) 325 MG: 325 (65 FE) TAB at 05:10

## 2019-09-15 RX ADMIN — NICOTINE POLACRILEX 2 MG: 2 GUM, CHEWING BUCCAL at 07:50

## 2019-09-15 RX ADMIN — OXYCODONE HYDROCHLORIDE 10 MG: 10 TABLET, FILM COATED, EXTENDED RELEASE ORAL at 05:10

## 2019-09-15 RX ADMIN — SODIUM CHLORIDE 2225 MG: 9 INJECTION, SOLUTION INTRAVENOUS at 00:25

## 2019-09-15 RX ADMIN — OXYCODONE HYDROCHLORIDE 10 MG: 10 TABLET ORAL at 07:50

## 2019-09-15 RX ADMIN — TRAMADOL HYDROCHLORIDE 100 MG: 50 TABLET, FILM COATED ORAL at 00:23

## 2019-09-15 RX ADMIN — LOSARTAN POTASSIUM 25 MG: 25 TABLET, FILM COATED ORAL at 05:10

## 2019-09-15 RX ADMIN — FUROSEMIDE 20 MG: 20 TABLET ORAL at 05:10

## 2019-09-15 RX ADMIN — OXYCODONE HYDROCHLORIDE 10 MG: 10 TABLET ORAL at 13:54

## 2019-09-15 RX ADMIN — SODIUM CHLORIDE 8 MG/HR: 9 INJECTION, SOLUTION INTRAVENOUS at 02:22

## 2019-09-15 RX ADMIN — TRAMADOL HYDROCHLORIDE 100 MG: 50 TABLET, FILM COATED ORAL at 10:50

## 2019-09-15 NOTE — PROGRESS NOTES
Pt is A&Ox4. Vss. Pt c/o 7/10 pain in knee. Medicated per MAR. Started PRBC insfusion. No issues noted at this time.     2015: Vitals remain stable. No s/s of reaction from infusion.    2155: PRBC infusion completed. VSS. No s/s of reaction.    2300: Pt is tolerating Iron dextran test dose. Pt states he feels a little jittery. VSS. q 10 min monitoring in place. Pt refused benadryl.    0030: 1 hr post test dose. No s/s of reaction. Started iron infusion.

## 2019-09-15 NOTE — PROGRESS NOTES
Hospital Medicine Daily Progress Note    Date of Service  9/14/2019    Chief Complaint  Anemia    Hospital Course    *44-year-old obese male with history of rheumatoid arthritis, chronic anemia with new superimposed acute blood loss anemia.  Previous baseline hemoglobin was 9-10, he was recently in the ER with coffee-ground emesis and melena.  Had upper endoscopy, shallow ulcers were noted with no active bleeding.  He was instructed that he could use anti-inflammatories on a very sparing and rare occasion, unfortunately he has had increased pain in his knees and has been taking it some more frequently than that although denies daily use.  He denies melena but his stools sound like they are very dark brown in color.  He is planning for knee replacement surgery on Monday and preop blood work showed hemoglobin of 6.9.  He came in for transfusion of 1 unit but after 1 unit was transfused he only came up to 7.6 which is now drifted down to 7.3.*      Interval Problem Update  Patient appears pale, discussed that hemoglobin did not come up as planned and I suspect he is continuing to have GI bleeding  He disclosed his NSAID use  Discussed with GI  Advised transfuse additional unit and monitor.  If comes up as expected no endoscopy is indicated.  Agrees with PPI.    Consultants/Specialty  Discussed with Dr. Malhotra who is familiar with the patient    Code Status  Full    Disposition  Anticipate home    Review of Systems  Review of Systems   Constitutional: Negative for chills and fever.   HENT: Negative for congestion and sore throat.    Eyes: Negative for discharge and redness.   Respiratory: Negative for shortness of breath.    Cardiovascular: Negative for chest pain.   Gastrointestinal: Negative for abdominal pain, nausea and vomiting.   Genitourinary: Negative for dysuria.   Musculoskeletal: Positive for joint pain.   Skin: Negative for itching and rash.   Neurological: Negative for dizziness and headaches.    Psychiatric/Behavioral: The patient is nervous/anxious (overly anxious to leave).         Physical Exam  Temp:  [36.4 °C (97.6 °F)-36.8 °C (98.2 °F)] 36.6 °C (97.9 °F)  Pulse:  [55-69] 55  Resp:  [16-17] 17  BP: (103-130)/(62-86) 119/70  SpO2:  [94 %-100 %] 99 %    Physical Exam   Constitutional: He is oriented to person, place, and time. He appears well-developed and well-nourished. No distress.   HENT:   Head: Normocephalic and atraumatic.   Pale oral mucosa   Eyes: Pupils are equal, round, and reactive to light. Conjunctivae and EOM are normal. Right eye exhibits no discharge. Left eye exhibits no discharge. No scleral icterus.   Neck: Neck supple.   Cardiovascular: Normal rate and regular rhythm.   Murmur (Loud, rumbling, 5 out of 6 URSB) heard.  Pulmonary/Chest: Effort normal and breath sounds normal. No respiratory distress.   Abdominal: Soft. He exhibits no distension. There is no tenderness.   Musculoskeletal: He exhibits edema and tenderness.   Neurological: He is alert and oriented to person, place, and time. No cranial nerve deficit.   Skin: Skin is warm and dry. He is not diaphoretic. There is pallor.   Psychiatric:   anxious   Nursing note and vitals reviewed.      Fluids    Intake/Output Summary (Last 24 hours) at 9/14/2019 1754  Last data filed at 9/14/2019 1649  Gross per 24 hour   Intake 1151.67 ml   Output 2150 ml   Net -998.33 ml       Laboratory  Recent Labs     09/13/19  1600 09/13/19  1929 09/14/19  1002   WBC 6.3 6.8  --    RBC 2.95* 3.09*  --    HEMOGLOBIN 7.0* 7.6* 7.3*   HEMATOCRIT 24.2* 25.4* 24.3*   MCV 82.0 82.2  --    MCH 23.7* 24.6*  --    MCHC 28.9* 29.9*  --    RDW 48.5 47.4  --    PLATELETCT 274 270  --    MPV 8.4* 8.6*  --      Recent Labs     09/13/19  1600   SODIUM 140   POTASSIUM 3.5*   CHLORIDE 107   CO2 23   GLUCOSE 73   BUN 10   CREATININE 0.78   CALCIUM 7.8*     Recent Labs     09/13/19  1600   APTT 27.0   INR 1.01               Imaging  No orders to display         Assessment/Plan  Elevated alkaline phosphatase level  Assessment & Plan  Suspect due to bone-on-bone destruction due to arthritis.    Essential hypertension  Assessment & Plan  Stable,  We will resume home dose of losartan and Lasix    Aortic root dilatation (HCC)  Assessment & Plan  Hx of recent echocardiogram which showed root diameter 3.9cm, stable resume follow up.     Iron deficiency anemia, unspecified iron deficiency anemia type- (present on admission)  Assessment & Plan  Iron profoundly low  Iron dextran replacement ordered  He has acute blood loss anemia superimposed on chronic anemia  Hemoglobin did not come up as expected after transfusion and suspect he is continuing to bleed as he describes dark dark brown stools  He also admits to taking anti-inflammatories.  Not daily but not infrequently after his last EGD  Discussed with GI  Recommend transfuse additional unit, agree with PPI, no further NSAIDs.  No indication for acute endoscopy at this time    Obesity (BMI 35.0-39.9 without comorbidity) (HCC)- (present on admission)  Assessment & Plan  Body mass index is 32.65 kg/m².      Rheumatoid arthritis (HCC)- (present on admission)  Assessment & Plan  Worsening per patient,  Patient is on Humira, follows  Rheumatology      MONICA (obstructive sleep apnea)- (present on admission)  Assessment & Plan  Resume home nightly CPAP  has machine         VTE prophylaxis: SCDs

## 2019-09-15 NOTE — PROGRESS NOTES
Pt resting in bed. Up to chair for breakfast. Both IV sites look intact. Protonix infusing. Pt a/o x 4, vss, appears to run bradycardic. Discussed POC for the day. Questions encouraged and answered.

## 2019-09-15 NOTE — PROGRESS NOTES
Pt discharged into care of mother. Discussed discharge meds, activity and follow up. Pt states understanding and asks no further questions. escorted to private transportation by staff.

## 2019-09-15 NOTE — DISCHARGE INSTRUCTIONS
Discharge Instructions per Mariaelena Tinajero M.D.    Follow up with Primary Care/ Orthopaedics  NO NOT TAKE ANY NSAIDS or ASPIRIN    DIET: regular    ACTIVITY: As tolerated    DIAGNOSIS: Anemia due to chronic GI blood loss 2/2 anastamotic ulcers 2/2 Gastric bypass + NSAID use  DJD Knees w/ Chronic pain    Return to ER if weakness, black loose stools    Discharge Instructions    Discharged to home by car with relative. Discharged via wheelchair, hospital escort: Yes.  Special equipment needed: Not Applicable    Be sure to schedule a follow-up appointment with your primary care doctor or any specialists as instructed.     Discharge Plan:   Diet Plan: Discussed  Activity Level: Discussed  Confirmed Follow up Appointment: Appointment Scheduled  Confirmed Symptoms Management: Discussed  Medication Reconciliation Updated: Yes  Influenza Vaccine Indication: Patient Refuses    I understand that a diet low in cholesterol, fat, and sodium is recommended for good health. Unless I have been given specific instructions below for another diet, I accept this instruction as my diet prescription.   Other diet: 2 gram sodium    Special Instructions:   Gastrointestinal Bleeding  Gastrointestinal (GI) bleeding is bleeding somewhere along the digestive tract, between the mouth and anus. This can be caused by various problems. The severity of these problems can range from mild to serious or even life-threatening. If you have GI bleeding, you may find blood in your stools (feces), you may have black stools, or you may vomit blood. If there is a lot of bleeding, you may need to stay in the hospital.  What are the causes?  This condition may be caused by:  · Esophagitis. This is inflammation, irritation, or swelling of the esophagus.  · Hemorrhoids. These are swollen veins in the rectum.  · Anal fissures. These are areas of painful tearing that are often caused by passing hard stool.  · Diverticulosis. These are pouches that form on  the colon over time, with age, and may bleed a lot.  · Diverticulitis. This is inflammation in areas with diverticulosis. It can cause pain, fever, and bloody stools, although bleeding may be mild.  · Polyps and cancer. Colon cancer often starts out as precancerous polyps.  · Gastritis and ulcers. With these, bleeding may come from the upper GI tract, near the stomach.  What are the signs or symptoms?  Symptoms of this condition may include:  · Bright red blood in your vomit, or vomit that looks like coffee grounds.  · Bloody, black, or tarry stools.  ¨ Bleeding from the lower GI tract will usually cause red or maroon blood in the stools.  ¨ Bleeding from the upper GI tract may cause black, tarry, often bad-smelling stools.  ¨ In certain cases, if the bleeding is fast enough, the stools may be red.  · Pain or cramping in the abdomen.  How is this diagnosed?  This condition may be diagnosed based on:  · Medical history and physical exam.  · Various tests, such as:  ¨ Blood tests.  ¨ X-rays and other imaging tests.  ¨ Esophagogastroduodenoscopy (EGD). In this test, a flexible, lighted tube is used to look at your esophagus, stomach, and small intestine.  ¨ Colonoscopy. In this test, a flexible, lighted tube is used to look at your colon.  How is this treated?  Treatment for this condition depends on the cause of the bleeding. For example:  · For bleeding from the esophagus, stomach, small intestine, or colon, the health care provider doing your EGD or colonoscopy may be able to stop the bleeding as part of the procedure.  · Inflammation or infection of the colon can be treated with medicines.  · Certain rectal problems can be treated with creams, suppositories, or warm baths.  · Surgery is sometimes needed.  · Blood transfusions are sometimes needed if a lot of blood has been lost.  If bleeding is slow, you may be allowed to go home. If there is a lot of bleeding, you will need to stay in the hospital for  observation.  Follow these instructions at home:  · Take over-the-counter and prescription medicines only as told by your health care provider.  · Eat foods that are high in fiber. This will help to keep your stools soft. These foods include whole grains, legumes, fruits, and vegetables. Eating 1-3 prunes each day works well for many people.  · Drink enough fluid to keep your urine clear or pale yellow.  · Keep all follow-up visits as told by your health care provider. This is important.  Contact a health care provider if:  · Your symptoms do not improve.  Get help right away if:  · Your bleeding increases.  · You feel light-headed or you faint.  · You feel weak.  · You have severe cramps in your back or abdomen.  · You pass large blood clots in your stool.  · Your symptoms are getting worse.  This information is not intended to replace advice given to you by your health care provider. Make sure you discuss any questions you have with your health care provider.  Document Released: 12/15/2001 Document Revised: 05/17/2017 Document Reviewed: 06/06/2016  [employer] Interactive Patient Education © 2017 [employer]      · Is patient discharged on Warfarin / Coumadin?   No     Depression / Suicide Risk    As you are discharged from this [facility] facility, it is important to learn how to keep safe from harming yourself.    Recognize the warning signs:  · Abrupt changes in personality, positive or negative- including increase in energy   · Giving away possessions  · Change in eating patterns- significant weight changes-  positive or negative  · Change in sleeping patterns- unable to sleep or sleeping all the time   · Unwillingness or inability to communicate  · Depression  · Unusual sadness, discouragement and loneliness  · Talk of wanting to die  · Neglect of personal appearance   · Rebelliousness- reckless behavior  · Withdrawal from people/activities they love  · Confusion- inability to concentrate     If you or a  loved one observes any of these behaviors or has concerns about self-harm, here's what you can do:  · Talk about it- your feelings and reasons for harming yourself  · Remove any means that you might use to hurt yourself (examples: pills, rope, extension cords, firearm)  · Get professional help from the community (Mental Health, Substance Abuse, psychological counseling)  · Do not be alone:Call your Safe Contact- someone whom you trust who will be there for you.  · Call your local CRISIS HOTLINE 036-1815 or 044-480-9354  · Call your local Children's Mobile Crisis Response Team [place] (223) 640-0528 or www.[employer]  · Call the toll free National Suicide Prevention Hotlines   · National Suicide Prevention Lifeline 326-738-ZLKB (6369)  · National Hope Line Network 800-SUICIDE (100-2152)

## 2019-09-16 ENCOUNTER — HOSPITAL ENCOUNTER (OUTPATIENT)
Dept: RADIOLOGY | Facility: MEDICAL CENTER | Age: 44
End: 2019-09-16
Attending: NURSE PRACTITIONER | Admitting: ORTHOPAEDIC SURGERY
Payer: COMMERCIAL

## 2019-09-16 ENCOUNTER — TELEPHONE (OUTPATIENT)
Dept: VASCULAR LAB | Facility: MEDICAL CENTER | Age: 44
End: 2019-09-16

## 2019-09-16 ENCOUNTER — OFFICE VISIT (OUTPATIENT)
Dept: MEDICAL GROUP | Facility: MEDICAL CENTER | Age: 44
End: 2019-09-16
Payer: COMMERCIAL

## 2019-09-16 VITALS
SYSTOLIC BLOOD PRESSURE: 100 MMHG | DIASTOLIC BLOOD PRESSURE: 68 MMHG | TEMPERATURE: 97.9 F | RESPIRATION RATE: 16 BRPM | OXYGEN SATURATION: 97 % | WEIGHT: 221 LBS | BODY MASS INDEX: 30.94 KG/M2 | HEIGHT: 71 IN | HEART RATE: 78 BPM

## 2019-09-16 DIAGNOSIS — G89.29 CHRONIC PAIN OF RIGHT KNEE: ICD-10-CM

## 2019-09-16 DIAGNOSIS — Z91.81 HISTORY OF FALL: ICD-10-CM

## 2019-09-16 DIAGNOSIS — D50.9 IRON DEFICIENCY ANEMIA, UNSPECIFIED IRON DEFICIENCY ANEMIA TYPE: ICD-10-CM

## 2019-09-16 DIAGNOSIS — M25.561 CHRONIC PAIN OF RIGHT KNEE: Chronic | ICD-10-CM

## 2019-09-16 DIAGNOSIS — M25.561 CHRONIC PAIN OF RIGHT KNEE: ICD-10-CM

## 2019-09-16 DIAGNOSIS — M54.9 UPPER BACK PAIN: ICD-10-CM

## 2019-09-16 DIAGNOSIS — G89.29 CHRONIC PAIN OF RIGHT KNEE: Chronic | ICD-10-CM

## 2019-09-16 DIAGNOSIS — Z74.09 IMPAIRED MOBILITY: ICD-10-CM

## 2019-09-16 DIAGNOSIS — M54.50 ACUTE LEFT-SIDED LOW BACK PAIN WITHOUT SCIATICA: ICD-10-CM

## 2019-09-16 DIAGNOSIS — R19.5 POSITIVE OCCULT STOOL BLOOD TEST: ICD-10-CM

## 2019-09-16 DIAGNOSIS — R10.32 LEFT GROIN PAIN: ICD-10-CM

## 2019-09-16 PROCEDURE — 72100 X-RAY EXAM L-S SPINE 2/3 VWS: CPT

## 2019-09-16 PROCEDURE — 72072 X-RAY EXAM THORAC SPINE 3VWS: CPT

## 2019-09-16 PROCEDURE — 99215 OFFICE O/P EST HI 40 MIN: CPT | Performed by: NURSE PRACTITIONER

## 2019-09-16 RX ORDER — HYDROMORPHONE HYDROCHLORIDE 2 MG/ML
0.4 INJECTION, SOLUTION INTRAMUSCULAR; INTRAVENOUS; SUBCUTANEOUS
Status: CANCELLED | OUTPATIENT
Start: 2019-09-16

## 2019-09-16 RX ORDER — OXYCODONE HCL 5 MG/5 ML
5 SOLUTION, ORAL ORAL
Status: CANCELLED | OUTPATIENT
Start: 2019-09-16

## 2019-09-16 RX ORDER — HALOPERIDOL 5 MG/ML
1 INJECTION INTRAMUSCULAR
Status: CANCELLED | OUTPATIENT
Start: 2019-09-16

## 2019-09-16 RX ORDER — HYDROMORPHONE HYDROCHLORIDE 2 MG/ML
0.1 INJECTION, SOLUTION INTRAMUSCULAR; INTRAVENOUS; SUBCUTANEOUS
Status: CANCELLED | OUTPATIENT
Start: 2019-09-16

## 2019-09-16 RX ORDER — MEPERIDINE HYDROCHLORIDE 25 MG/ML
6.25 INJECTION INTRAMUSCULAR; INTRAVENOUS; SUBCUTANEOUS
Status: CANCELLED | OUTPATIENT
Start: 2019-09-16

## 2019-09-16 RX ORDER — SODIUM CHLORIDE, SODIUM LACTATE, POTASSIUM CHLORIDE, CALCIUM CHLORIDE 600; 310; 30; 20 MG/100ML; MG/100ML; MG/100ML; MG/100ML
INJECTION, SOLUTION INTRAVENOUS CONTINUOUS
Status: CANCELLED | OUTPATIENT
Start: 2019-09-16

## 2019-09-16 RX ORDER — HYDROMORPHONE HYDROCHLORIDE 2 MG/ML
0.2 INJECTION, SOLUTION INTRAMUSCULAR; INTRAVENOUS; SUBCUTANEOUS
Status: CANCELLED | OUTPATIENT
Start: 2019-09-16

## 2019-09-16 RX ORDER — ONDANSETRON 2 MG/ML
4 INJECTION INTRAMUSCULAR; INTRAVENOUS
Status: CANCELLED | OUTPATIENT
Start: 2019-09-16

## 2019-09-16 RX ORDER — GABAPENTIN 300 MG/1
300 CAPSULE ORAL ONCE
Status: CANCELLED | OUTPATIENT
Start: 2019-09-16 | End: 2019-09-16

## 2019-09-16 RX ORDER — OXYCODONE HYDROCHLORIDE 10 MG/1
10 TABLET ORAL EVERY 4 HOURS PRN
Qty: 60 TAB | Refills: 0 | Status: SHIPPED | OUTPATIENT
Start: 2019-09-16 | End: 2019-09-19

## 2019-09-16 RX ORDER — HYDRALAZINE HYDROCHLORIDE 20 MG/ML
5 INJECTION INTRAMUSCULAR; INTRAVENOUS
Status: CANCELLED | OUTPATIENT
Start: 2019-09-16

## 2019-09-16 RX ORDER — LABETALOL HYDROCHLORIDE 5 MG/ML
5 INJECTION, SOLUTION INTRAVENOUS
Status: CANCELLED | OUTPATIENT
Start: 2019-09-16

## 2019-09-16 RX ORDER — OXYCODONE HCL 5 MG/5 ML
10 SOLUTION, ORAL ORAL
Status: CANCELLED | OUTPATIENT
Start: 2019-09-16

## 2019-09-16 RX ORDER — ACETAMINOPHEN 500 MG
1000 TABLET ORAL ONCE
Status: CANCELLED | OUTPATIENT
Start: 2019-09-16 | End: 2019-09-16

## 2019-09-16 NOTE — DISCHARGE SUMMARY
Discharge Summary    CHIEF COMPLAINT ON ADMISSION  Chief Complaint   Patient presents with   • Abnormal Labs     sent here by doctor for low hemoglobin       Reason for Admission  Abnormal Labs     Admission Date  9/13/2019    CODE STATUS  No Order    HPI & HOSPITAL COURSE  *44-year-old obese male with history of rheumatoid arthritis, chronic anemia with new superimposed acute blood loss anemia.  Previous baseline hemoglobin was 9-10, he was recently in the ER with coffee-ground emesis and melena.  Had upper endoscopy, shallow ulcers were noted with no active bleeding.  He was instructed that he could use anti-inflammatories on a very sparing and rare occasion, unfortunately he has had increased pain in his knees and has been taking it some more frequently than that although denies daily use.  He denies melena but his stools sound like they are very dark brown in color.  He is planning for knee replacement surgery on Monday and preop blood work showed hemoglobin of 6.9.  He came in for transfusion of 1 unit but after 1 unit was transfused he only came up to 7.6 which is now drifted down to 7.3.*      Patient was anxious to leave the hospital I explained that this indicated that he is still losing blood, he at this point confessed that he is using anti-inflammatories not and daily basis but he has been using them more often than before and was apparently told by gastroenterology that it was okay for occasional use despite the fact that he has had recurrent significant GI bleeding due to NSAID use.  He was placed on Protonix drip, occult blood confirmed that he has ongoing blood loss, he was given iron replacement per pharmacy, transfused additional unit of PRBCs.  At this point his hemoglobin increased as expected.  In this scenario GI did not feel he required repeat endoscopy, patient was reassured and discharged home.  I made it very clear that he is to under no circumstances resume aspirin or NSAID use at all.   Patient verbalized understanding of the above.    I am not able to transfuse him further as he is no longer actively bleeding, he may still have surgery on Monday and they may give him perioperative blood as indicated but I am unable to ordered at this point.    Given his chronic osteoarthritis pain any prescriptions for opiates will need to come from the doctors who are treating him as an outpatient for this, therefore I did not prescribe opiates at discharge.    Therefore, he is discharged in fair and stable condition to home with close outpatient follow-up.      Discharge Date  9/15/2019    FOLLOW UP ITEMS POST DISCHARGE  Ortho  PCP he was weird super weird like a guys not to do anything    DISCHARGE DIAGNOSES  Active Problems:    MONICA (obstructive sleep apnea) (Chronic) POA: Yes      Overview: AHI 22, minimum saturation 77%, on CPAP 10 cm    Rheumatoid arthritis (HCC) (Chronic) POA: Yes    Obesity (BMI 35.0-39.9 without comorbidity) (HCC) POA: Yes    Iron deficiency anemia, unspecified iron deficiency anemia type POA: Yes    Aortic root dilatation (HCC) POA: Unknown    Essential hypertension POA: Unknown    Elevated alkaline phosphatase level POA: Unknown  Resolved Problems:    * No resolved hospital problems. *      FOLLOW UP  Future Appointments   Date Time Provider Department Center   9/16/2019  2:40 PM SMITH TurnerPMaryRMaryN. [address]   9/19/2019  7:00 AM Gerardo Gutierrez M.D. [facility] None   9/19/2019 11:40 AM SMITH TurnerP.R.N. [address]   11/6/2019  4:20 PM Trudi Urbina, Ph.D. [address] BEBETO Torrez M.D.  [address]  [place] [address]  [phone]    On 9/16/2019      JESSICA Turner.P.R.N.  [address]  [facility] [address]  [place] [address]  [phone]    In 1 week        MEDICATIONS ON DISCHARGE     Medication List      START taking these medications      Instructions   sucralfate 1 GM Tabs  Commonly known as:  CARAFATE   Take 1 Tab by mouth  4 Times a Day,Before Meals and at Bedtime.  Dose:  1 g        CHANGE how you take these medications      Instructions   * ferrous sulfate 325 (65 Fe) MG tablet  What changed:  Another medication with the same name was added. Make sure you understand how and when to take each.   Take 325 mg by mouth every day.  Dose:  325 mg     * ferrous sulfate 325 (65 Fe) MG tablet  What changed:  You were already taking a medication with the same name, and this prescription was added. Make sure you understand how and when to take each.   Take 1 Tab by mouth every day.  Dose:  325 mg     omeprazole 20 MG delayed-release capsule  What changed:  how much to take  Commonly known as:  PRILOSEC   Take 2 Caps by mouth 2 times a day.  Dose:  40 mg         * This list has 2 medication(s) that are the same as other medications prescribed for you. Read the directions carefully, and ask your doctor or other care provider to review them with you.            CONTINUE taking these medications      Instructions   acetaminophen 500 MG Tabs  Commonly known as:  TYLENOL   Take 1,000 mg by mouth every 6 hours as needed.  Dose:  1,000 mg     furosemide 20 MG Tabs  Commonly known as:  LASIX   Take 1 Tab by mouth 2 times a day.  Dose:  20 mg     HUMIRA 40 MG/0.4ML Pskt  Generic drug:  Adalimumab   Inject 40 mg as instructed every 14 days.  Dose:  40 mg     losartan 25 MG Tabs  Commonly known as:  COZAAR   Take 1 Tab by mouth every day for 360 days.  Dose:  25 mg     methocarbamol 750 MG Tabs  Commonly known as:  ROBAXIN   Take 1 Tab by mouth 4 times a day as needed (pain).  Dose:  750 mg     predniSONE 10 MG Tabs  Commonly known as:  DELTASONE   Take 5-30 mg by mouth every day. Take 30 mg daily for 5 days, 20 mg daily for 5 days, 10 mg daily for 5 days, 5 mg daily 14 days  Dose:  5-30 mg        STOP taking these medications    oxyCODONE-acetaminophen 5-325 MG Tabs  Commonly known as:  PERCOCET            Allergies  Allergies   Allergen Reactions   •  "Benadryl [Altaryl]      \"I get agitated\"   • Nsaids      Bleeding, \"I had a bleeding ulcer\"   • Phenergan [Promethazine Hcl]      \"I get very agitated\"   • Penicillins      \"Had some dizziness\"       DIET  Low carbohydrate    ACTIVITY  As toelrated    CONSULTATIONS  Discussed w/ GI over phone    PROCEDURES  none    LABORATORY  Lab Results   Component Value Date    SODIUM 140 09/13/2019    POTASSIUM 3.5 (L) 09/13/2019    CHLORIDE 107 09/13/2019    CO2 23 09/13/2019    GLUCOSE 73 09/13/2019    BUN 10 09/13/2019    CREATININE 0.78 09/13/2019    CREATININE 0.95 10/19/2010    GLOMRATE >59 10/19/2010        Lab Results   Component Value Date    WBC 6.4 09/15/2019    WBC 8.4 10/19/2010    HEMOGLOBIN 8.4 (L) 09/15/2019    HEMATOCRIT 27.7 (L) 09/15/2019    PLATELETCT 257 09/15/2019          "

## 2019-09-16 NOTE — TELEPHONE ENCOUNTER
MANJU Daniels received call from patient regarding preop clearance for knee arthroplasty today.      Chart reviewed since my encounter with him on 9/12 at which time his hgb was 6.9 and was referred to ED for further eval.  Pt seen at ED on 9/13 and admitted.  Per H&P had 1 unit PRBC and hgb raised to 8.4.  Case reviewed with Dr. Torrez (ortho) directly and unable to complete operation based upon crit <30 and high risk for perioperative infection should he need post-op transfusions.  Too high risk to proceed, and procedure was canceled.  Dr. ORTA to contact pt regarding rescheduling.  Patient to f/u with PCP and GI for ongoing care of anemia prior to future surgery.  Vascular med clinic available as needed in the future, but defer to PCP and GI for ongoing anemia mgmt.  Wojciech Silva M.D.

## 2019-09-16 NOTE — LETTER
[facility]  Brit Fitch A.P.R.N.  [address] [facility] [address]  [place] [address]  Fax: [phone]   Authorization for Release/Disclosure of   Protected Health Information   Name: LESLIE HUBBARD II : 1975 SSN: [id number]   Address: [facility] [address] [facility]  [place] [address] Phone:    [phone] (home)    I authorize the entity listed below to release/disclose the PHI below to:   [employer]/Brit Fitch, A.P.R.N. and Brit Fitch A.P.R.N.   Provider or Entity Name:  Dr Malhotra   Address   City, State, [facility]   Phone:      Fax:     Reason for request: continuity of care   Information to be released:    [  ] LAST COLONOSCOPY,  including any PATH REPORT and follow-up  [  ] LAST FIT/COLOGUARD RESULT [  ] LAST DEXA  [  ] LAST MAMMOGRAM  [  ] LAST PAP  [  ] LAST LABS [  ] RETINA EXAM REPORT  [  ] IMMUNIZATION RECORDS  [ x ] Release all info      [  ] Check here and initial the line next to each item to release ALL health information INCLUDING  _____ Care and treatment for drug and / or alcohol abuse  _____ HIV testing, infection status, or AIDS  _____ Genetic Testing    DATES OF SERVICE OR TIME PERIOD TO BE DISCLOSED: _____________  I understand and acknowledge that:  * This Authorization may be revoked at any time by you in writing, except if your health information has already been used or disclosed.  * Your health information that will be used or disclosed as a result of you signing this authorization could be re-disclosed by the recipient. If this occurs, your re-disclosed health information may no longer be protected by State or Federal laws.  * You may refuse to sign this Authorization. Your refusal will not affect your ability to obtain treatment.  * This Authorization becomes effective upon signing and will  on (date) __________.      If no date is indicated, this Authorization will  one (1) year from the signature date.    Name: Leslie Hubbard  II    Signature:   Date:     9/17/2019       PLEASE FAX REQUESTED RECORDS BACK TO: [phone]

## 2019-09-16 NOTE — TELEPHONE ENCOUNTER
Patient called in this morning stating that he did have a transfusion done over the weekend due to low A1c levels. Patient would like to know if Dr. Silva will clear him for his surgery today. Message sent to both Dr. Bloch and Dr. Silva.

## 2019-09-16 NOTE — PROGRESS NOTES
cc:  Follow up recent ER admission/anemia/chronic knee pain      Subjective:     HPI:     Richard Hubbard II is a 44 y.o. male here to discuss the evaluation and management of:    Patient is here today with his wife to follow-up after his recent visit to the emergency room for anemia.  Patient was scheduled to have a total right knee replacement today however during his preoperative cardiovascular exam did reveal that his hemoglobin was trending down. His hemoglobin was 6.9-he was urged to go to the emergency room for further work-up and blood transfusion by his vascular medicine provider. While the emergency room he did receive 1 unit of packed red blood cells as well as an iron infusion.  Patient's hemoglobin on discharge was 8.4.  He also had a positive occult stool while in the emergency room.  Patient does not currently have a follow-up with a gastroenterologist at this time.  Of note: patient was seen in the emergency room on August 2 for hematemesis.  He was seen by GI and his Nexium was increased to twice a day and he was discharged and had followed up outpatient with an EGD.  He reports that the EGD did not reveal any bleeding ulcers at that time.  He does mention that he does have a history of having bleeding ulcers in the past.  He currently denies any hematemesis.    Chronic pain in right knee  Patient states that he has been suffering for quite some time with his right knee pain.  States that for several years he has been told that he would not be a candidate to have any surgery.  In the past he does report that he had a problem with narcotics.  States that he had been trying to avoid taking any narcotics for his pain so he had been taking NSAIDs. unfortunately he is unable to take them anymore due to anemia and +occult stool.     He reports that he recently was walking with a cane and had tripped on a rug and fell.  He was trying to protect his right knee however he ended up hurting his left  groin as well as his back from this fall.  He states that he has been applying heat and that has been somewhat helpful.  He has been using a walker at this time.  He is having instability.  He is concerned about his back.  His wife states that he is unable to even care for the kids at home.  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  The youngest is 17-months-old.  At this time since the patient is strongly advised to not take anymore NSAIDs he is requesting to have pain medication.  Wife states that she does have a lock box and will be controlling the pain medication.  She states that this is why she came to the appointment today-because she would like it to be known that she will be ensuring that there is no potential for abuse or addiction.  He is unable to tolerate the pain in his knee and is become very debilitating for him.  Patient would benefit from a shower chair to help reduce his risk of falls in the shower.  Of note he does have an appointment with physiatry this coming Thursday to see if there are other treatment possibilities since he is unable to have a right knee replacement at this time.      ROS:  Denies any Headache, Blurred Vision, Confusion, Chest pain,  Shortness of breath,  Abdominal pain, Changes of bowel or bladder, Lower ext edema, Fevers, Nights sweats, Weight Changes, Focal weakness or numbness.  And all other systems are negative.  Right knee pain, left groin pain, left lower back pain, fatigue, depression        Current Outpatient Medications:   •  Misc. Devices Misc, One shower chair Dx:Z74.09, Z91.81, Disp: 1 Device, Rfl: 0  •  oxyCODONE immediate-release (ROXICODONE) 10 MG immediate release tablet, Take 1 Tab by mouth every four hours as needed for Severe Pain for up to 30 days., Disp: 60 Tab, Rfl: 0  •  omeprazole (PRILOSEC) 20 MG delayed-release capsule, Take 2 Caps by mouth 2 times a day., Disp: 60 Cap, Rfl: 3  •  sucralfate (CARAFATE) 1 GM Tab, Take 1 Tab  "by mouth 4 Times a Day,Before Meals and at Bedtime., Disp: 120 Tab, Rfl: 0  •  ferrous sulfate 325 (65 Fe) MG tablet, Take 1 Tab by mouth every day., Disp: 30 Tab, Rfl: 3  •  predniSONE (DELTASONE) 10 MG Tab, Take 5-30 mg by mouth every day. Take 30 mg daily for 5 days, 20 mg daily for 5 days, 10 mg daily for 5 days, 5 mg daily 14 days, Disp: , Rfl:   •  Adalimumab (HUMIRA) 40 MG/0.4ML Prefilled Syringe Kit, Inject 40 mg as instructed every 14 days., Disp: , Rfl:   •  acetaminophen (TYLENOL) 500 MG Tab, Take 1,000 mg by mouth every 6 hours as needed., Disp: , Rfl:   •  methocarbamol (ROBAXIN) 750 MG Tab, Take 1 Tab by mouth 4 times a day as needed (pain)., Disp: 60 Tab, Rfl: 0  •  furosemide (LASIX) 20 MG Tab, Take 1 Tab by mouth 2 times a day., Disp: 60 Tab, Rfl: 6  •  ferrous sulfate 325 (65 Fe) MG tablet, Take 325 mg by mouth every day., Disp: , Rfl:   •  losartan (COZAAR) 25 MG Tab, Take 1 Tab by mouth every day for 360 days., Disp: 90 Tab, Rfl: 3    Allergies   Allergen Reactions   • Benadryl [Altaryl]      \"I get agitated\"   • Nsaids      Bleeding, \"I had a bleeding ulcer\"   • Phenergan [Promethazine Hcl]      \"I get very agitated\"   • Penicillins      \"Had some dizziness\"       Past Medical History:   Diagnosis Date   • ADD (attention deficit disorder)    • Alcohol abuse    • Allergic rhinitis 5/21/2009   • Anemia 09/2019   • Anxiety 5/21/2009   • Back pain 5/21/2009   • Bipolar disorder (HCC)    • Bleeding ulcer 5/21/2009   • Depression 5/21/2009   • Eczema 5/21/2009   • History of bleeding ulcers 2/12/2015   • Hypertension    • Insomnia 5/21/2009   • Migraine 5/21/2009   • Obesity, morbid (HCC) 5/21/2009   • Pain 09/2019    Chronic knee and back pain   • Rheumatoid arteritis 09/2019    knee, feet   • Sleep apnea 5/21/2009    Did not tolerate cpap, does not use it   • Snoring      Past Surgical History:   Procedure Laterality Date   • GASTRIC BYPASS LAPAROSCOPIC  2000    Lucila en y   • CHOLECYSTECTOMY  2000 "     Family History   Problem Relation Age of Onset   • Hypertension Mother    • Arthritis Mother    • Depression Mother    • Cancer Father         bladder   • Schizophrenia Father    • Cancer Maternal Grandmother         breast   • Heart Disease Maternal Grandmother    • Psychiatric Illness Other    • Stroke Maternal Aunt    • Other Neg Hx         no connective tissue disorders or known aortic disease     Social History     Socioeconomic History   • Marital status:      Spouse name: Not on file   • Number of children: Not on file   • Years of education: Not on file   • Highest education level: Not on file   Occupational History   • 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   Social Needs   • Financial resource strain: Not on file   • Food insecurity:     Worry: Not on file     Inability: Not on file   • Transportation needs:     Medical: Not on file     Non-medical: Not on file   Tobacco Use   • Smoking status: Current Every Day Smoker     Packs/day: 0.25     Years: 3.00     Pack years: 0.75     Types: Cigarettes, Cigars   • Smokeless tobacco: Never Used   • Tobacco comment: occasional cigar   Substance and Sexual Activity   • Alcohol use: No     Alcohol/week: 0.0 oz     Comment: quit 2.5 yrs ago   • Drug use: No   • Sexual activity: Yes     Partners: Female     Comment: ;    Lifestyle   • Physical activity:     Days per week: Not on file     Minutes per session: Not on file   • Stress: Not on file   Relationships   • Social connections:     Talks on phone: Not on file     Gets together: Not on file     Attends Yarsanism service: Not on file     Active member of club or organization: Not on file     Attends meetings of clubs or organizations: Not on file     Relationship status: Not on file   • Intimate partner violence:     Fear of current or ex partner: Not on file     Emotionally abused: Not on file     Physically abused: Not on file     Forced sexual activity: Not on file   Other Topics Concern   • Not on file  "  Social History Narrative    , lives in [place]       Objective:     Vitals: /68   Pulse 78   Temp 36.6 °C (97.9 °F)   Resp 16   Ht 1.803 m (5' 11\")   Wt 100.2 kg (221 lb)   SpO2 97%   BMI 30.82 kg/m²    General: Alert, pleasant, NAD, pale  HEENT: Normocephalic.  Neck supple.  No thyromegaly or masses palpated. No cervical or supraclavicular lymphadenopathy.  Heart: Regular rate and rhythm.  S1 and S2 normal.  No murmurs appreciated.  Respiratory: Normal respiratory effort.  Clear to auscultation bilaterally. No wheezing  Musculoskeletal: Wearing a knee brace on the right knee.  Impaired balance  Skin: Warm, dry, no rashes.pale  Extremities: No leg edema. No discoloration  Neurological: No tremors, using a front rolled walker  Psych:  Affect/mood is normal, depressed, tearful judgement is good, memory is intact, grooming is appropriate.    Assessment/Plan:     Diagnoses and all orders for this visit:    Iron deficiency anemia, unspecified iron deficiency anemia type  Worsening.  Patient is pale and fatigued.  Have placed an urgent referral over to gastroenterology.  Patient will likely need an EGD as well as a colonoscopy.  Continue with iron supplementation at this time.  Have discussed possible IV infusion as well.   -     REFERRAL TO GASTROENTEROLOGY    Positive occult stool blood test  Patient has a positive occult stool recently while in the emergency room being worked up for his anemia.  Denies any tiffanie blood or black tarry stools.  Last NSAID use was over a week ago.  Again have reminded him not to use any more NSAIDs.  -     REFERRAL TO GASTROENTEROLOGY    Chronic pain of right knee  Acute left-sided low back pain without sciatica  Have discussed with the patient and the wife regarding taking pain medications.  There is a concern as of over 8 years ago patient did have a difficult time with weaning off of pain medication.  He has been taking NSAIDs every since then for his rheumatoid " arthritis as well as his chronic right knee pain.  He has been informed multiple times not to use any NSAIDs however, his pain has become unbearable.  Had a long discussion with the patient and the wife and feel that it is appropriate to write a prescription for 60 tablets and patient's wife states that she will be monitoring the pain medications to make sure that he is taking appropriately.  He does have an appointment physiatry coming up this week to discuss other possible options for him as again he does not want to be on chronic pain medications like he was in the past.  -     DX-LUMBAR SPINE-2 OR 3 VIEWS; Future  -     DX-THORACIC SPINE-WITH SWIMMERS VIEW; Future  -     REFERRAL TO PHYSICAL THERAPY Reason for Therapy: Eval/Treat/Report  -     oxyCODONE immediate-release (ROXICODONE) 10 MG immediate release tablet; Take 1 Tab by mouth every four hours as needed for Severe Pain for up to 30 days.  May fill on September 16, 2018    Upper back pain  Have placed an order for imaging.    Left groin pain  Have discussed possible muscle strain.  Continue to use ice or heat.  -     REFERRAL TO PHYSICAL THERAPY Reason for Therapy: Eval/Treat/Report    History of fall  Continue to use the walker as well as a knee brace.  Continue to be mindful of environmental hazards..    Impaired mobility  Using a walker and a knee brace on his right knee.  Have placed an order for a shower chair for patient.    Other orders  - Misc. Devices Misc; One shower chair Dx:Z74.09, Z91.81  Dispense: 1 Device; Refill: 0      Patient was seen for 40 minutes face to face of which > 50% of appointment time was spent on counseling and coordination of care regarding the above.      Return in about 4 weeks (around 10/14/2019).          Face to Face Note  -  Durable Medical Equipment    MARTÍN Turner - NPI: [id number]  I certify that this patient is under my care and that they had a durable medical equipment(DME)face to face  encounter by myself that meets the physician DME face-to-face encounter requirements with this patient on:    Date of encounter:   Patient:                    MRN:                       YOB: 2018  Richard Hubbard II  [id number]  1975     The encounter with the patient was in whole, or in part, for the following medical condition, which is the primary reason for durable medical equipment:  Other - Chronic right knee pain, impaired mobility, recent fall.    I certify that, based on my findings, the following durable medical equipment is medically necessary:  Other DME Equipment - Portable shower chair.    HOME O2 Saturation Measurements:(Values must be present for Home Oxygen orders)      n/a   ,     ,         My Clinical findings support the need for the above equipment due to:  Abnormal Gait, Frequent Falls    Supporting Symptoms:  patient is in need of having a total knee replacement, recent fall, impaired mobility      Brit AGUDELO

## 2019-09-17 PROBLEM — R19.5 POSITIVE OCCULT STOOL BLOOD TEST: Status: ACTIVE | Noted: 2019-09-17

## 2019-09-19 ENCOUNTER — OFFICE VISIT (OUTPATIENT)
Dept: PHYSICAL MEDICINE AND REHAB | Facility: MEDICAL CENTER | Age: 44
End: 2019-09-19
Payer: COMMERCIAL

## 2019-09-19 ENCOUNTER — APPOINTMENT (OUTPATIENT)
Dept: MEDICAL GROUP | Facility: MEDICAL CENTER | Age: 44
End: 2019-09-19
Payer: COMMERCIAL

## 2019-09-19 VITALS
TEMPERATURE: 98.2 F | HEART RATE: 88 BPM | OXYGEN SATURATION: 97 % | SYSTOLIC BLOOD PRESSURE: 118 MMHG | BODY MASS INDEX: 30.94 KG/M2 | WEIGHT: 221 LBS | HEIGHT: 71 IN | DIASTOLIC BLOOD PRESSURE: 68 MMHG

## 2019-09-19 DIAGNOSIS — G89.29 CHRONIC KNEE PAIN, UNSPECIFIED LATERALITY: ICD-10-CM

## 2019-09-19 DIAGNOSIS — M25.561 CHRONIC PAIN OF RIGHT KNEE: Chronic | ICD-10-CM

## 2019-09-19 DIAGNOSIS — Z98.84 HISTORY OF GASTRIC BYPASS: ICD-10-CM

## 2019-09-19 DIAGNOSIS — M17.10 ARTHRITIS OF KNEE: ICD-10-CM

## 2019-09-19 DIAGNOSIS — F11.90 CHRONIC, CONTINUOUS USE OF OPIOIDS: ICD-10-CM

## 2019-09-19 DIAGNOSIS — Z87.11 HISTORY OF GASTRIC ULCER: ICD-10-CM

## 2019-09-19 DIAGNOSIS — E66.9 OBESITY (BMI 35.0-39.9 WITHOUT COMORBIDITY): ICD-10-CM

## 2019-09-19 DIAGNOSIS — M06.9 RHEUMATOID ARTHRITIS INVOLVING MULTIPLE SITES, UNSPECIFIED RHEUMATOID FACTOR PRESENCE: Chronic | ICD-10-CM

## 2019-09-19 DIAGNOSIS — M25.569 CHRONIC KNEE PAIN, UNSPECIFIED LATERALITY: ICD-10-CM

## 2019-09-19 DIAGNOSIS — G47.33 OSA (OBSTRUCTIVE SLEEP APNEA): Chronic | ICD-10-CM

## 2019-09-19 DIAGNOSIS — M54.50 CHRONIC MIDLINE LOW BACK PAIN WITHOUT SCIATICA: ICD-10-CM

## 2019-09-19 DIAGNOSIS — G89.29 CHRONIC PAIN OF RIGHT KNEE: Chronic | ICD-10-CM

## 2019-09-19 DIAGNOSIS — F10.10 ALCOHOL ABUSE: ICD-10-CM

## 2019-09-19 DIAGNOSIS — G89.29 CHRONIC MIDLINE LOW BACK PAIN WITHOUT SCIATICA: ICD-10-CM

## 2019-09-19 DIAGNOSIS — D50.9 IRON DEFICIENCY ANEMIA, UNSPECIFIED IRON DEFICIENCY ANEMIA TYPE: ICD-10-CM

## 2019-09-19 PROCEDURE — 99205 OFFICE O/P NEW HI 60 MIN: CPT | Performed by: PHYSICAL MEDICINE & REHABILITATION

## 2019-09-19 RX ORDER — NALOXONE HYDROCHLORIDE 4 MG/.1ML
1 SPRAY NASAL PRN
Qty: 1 PACKAGE | Refills: 1 | Status: ON HOLD | OUTPATIENT
Start: 2019-09-19 | End: 2019-10-17

## 2019-09-19 RX ORDER — PREGABALIN 25 MG/1
25 CAPSULE ORAL 3 TIMES DAILY
Qty: 90 CAP | Refills: 0 | Status: ON HOLD | OUTPATIENT
Start: 2019-09-19 | End: 2019-11-01

## 2019-09-19 RX ORDER — OXYCODONE HYDROCHLORIDE 10 MG/1
10 TABLET ORAL 3 TIMES DAILY PRN
Qty: 90 TAB | Refills: 0 | Status: ON HOLD | OUTPATIENT
Start: 2019-10-16 | End: 2019-11-01

## 2019-09-19 RX ORDER — LIDOCAINE 50 MG/G
2 PATCH TOPICAL EVERY 24 HOURS
Qty: 30 PATCH | Refills: 1 | Status: ON HOLD | OUTPATIENT
Start: 2019-09-19 | End: 2019-10-17

## 2019-09-19 RX ORDER — PREGABALIN 25 MG/1
25 CAPSULE ORAL 3 TIMES DAILY
Qty: 90 CAP | Refills: 0 | Status: ON HOLD | OUTPATIENT
Start: 2019-10-19 | End: 2019-10-17

## 2019-09-19 RX ORDER — ACETAMINOPHEN 500 MG
1000 TABLET ORAL 3 TIMES DAILY PRN
Qty: 180 TAB | Refills: 6 | Status: ON HOLD | OUTPATIENT
Start: 2019-09-19 | End: 2019-10-17

## 2019-09-19 ASSESSMENT — ENCOUNTER SYMPTOMS
BACK PAIN: 1
NUMBNESS: 0
LEG PAIN: 0

## 2019-09-19 ASSESSMENT — PATIENT HEALTH QUESTIONNAIRE - PHQ9
SUM OF ALL RESPONSES TO PHQ QUESTIONS 1-9: 11
5. POOR APPETITE OR OVEREATING: 2 - MORE THAN HALF THE DAYS
CLINICAL INTERPRETATION OF PHQ2 SCORE: 3

## 2019-09-19 NOTE — PROGRESS NOTES
New patient note    Physiatry (physical medicine and  Rehabilitation), interventional spine and sports medicine    Date of Service: 9/19/2019    Chief complaint:   Chief Complaint   Patient presents with   • New Patient     Bilater Leg pain and Low back pain        HISTORY    HPI: Richard Hubbard II 44 y.o. male who presents today with Diagnoses of Rheumatoid arthritis involving multiple sites, unspecified rheumatoid factor presence (HCC), Obesity (BMI 35.0-39.9 without comorbidity) (HCC), MONICA (obstructive sleep apnea), Iron deficiency anemia, unspecified iron deficiency anemia type, Chronic midline low back pain without sciatica, Chronic knee pain, unspecified laterality, Arthritis of knee, Chronic, continuous use of opioids, Alcohol abuse, History of gastric bypass, avoid NSAIDs, History of gastric ulcer, avoid NSAIDs, and Chronic pain of right knee were pertinent to this visit.    Back Pain   This is a new problem. Episode onset: late august fall. The problem occurs constantly. The problem is unchanged. The pain is present in the gluteal, lumbar spine and sacro-iliac (left). The quality of the pain is described as aching. The pain is at a severity of 8/10. The symptoms are aggravated by sitting, standing and position. Pertinent negatives include no leg pain or numbness. He has tried heat, ice, muscle relaxant, NSAIDs, analgesics, home exercises and walking for the symptoms. The treatment provided no relief.   Knee Pain   This is a chronic (in the process of getting a knee replacement but was postponed. ) problem. The current episode started more than 1 year ago (severe 10/10). The problem occurs constantly. The problem has been gradually worsening. Pertinent negatives include no numbness. The symptoms are aggravated by walking and twisting. He has tried oral narcotics, position changes, NSAIDs, heat, ice, acetaminophen and walking for the symptoms. The treatment provided no relief.     Reports clean and  sober for 3 and a half years.  Previously had a psychiatrist, but wants to avoid adderal so he has a new psychiatrist. Not seeing a psychologist currently.     Takes robaxin but not helping.     Reports his wife who is an NP, locks his medication and doses enough per day for him.     Tried gabapentin but he was unable to tolerate this because of psych. It did help with the pain but he had cognitive fog.      Psychological testing for pain as depression and pain commonly coexist and need to both be treated.     Opioid Risk Score: 16    Interpretation of Opioid Risk Score   Score 0-3 = Low risk of abuse. Do UDS at least once per year.  Score 4-7 = Moderate risk of abuse. Do UDS 1-4 times per year.  Score 8+ = High risk of abuse. Refer to specialist.    PHQ  Depression Screen (PHQ-2/PHQ-9) 7/30/2019 9/13/2019 9/19/2019   PHQ-2 Total Score 0 0 -   PHQ-2 Total Score - - -   PHQ-2 Total Score - - 3   PHQ-9 Total Score 0 - -   PHQ-9 Total Score - - 11       Interpretation of PHQ-9 Total Score   Score Severity   1-4 No Depression   5-9 Mild Depression   10-14 Moderate Depression   15-19 Moderately Severe Depression   20-27 Severe Depression     Medical records review:  I reviewed the notes from Mariaelena Tinajero MD from the discharge summary from 9/15/2019 from the patient's emergency visit and hospital stay where he had ulcers, melena, coffee-ground emesis, a drop in hemoglobin with a hemoglobin of 6.9 and had 1 unit transfused.  I also reviewed the notes from patient's PCP Brit Fitch.  The patient reportedly has a history of rheumatoid arthritis, has been sent to physical therapy and given oxycodone immediately release, x-rays of the lumbar and thoracic spine were done.  Patient also has significant knee pain.    I also reviewed the note from Nani Resendiz PA-C.  The patient had a diagnosis of the left groin strain, given Robaxin and Percocet as well as referred to physiatry.      ROS:   Red Flags ROS:    Fever, Chills, Sweats: Denies  Involuntary Weight Loss: Denies  Bladder Incontinence: Denies  Bowel Incontinence: denies  Saddle Anesthesia: Denies    All other systems reviewed and negative.       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 60 Cap 3   • sucralfate (CARAFATE) 1 GM Tab Take 1 Tab by mouth 4 Times a Day,Before Meals and at Bedtime. 120 Tab 0   • Adalimumab (HUMIRA) 40 MG/0.4ML Prefilled Syringe Kit Inject 40 mg as instructed every 14 days.     • furosemide (LASIX) 20 MG Tab Take 1 Tab by mouth 2 times a day. 60 Tab 6   • ferrous sulfate 325 (65 Fe) MG tablet Take 325 mg by mouth every day.     • losartan (COZAAR) 25 MG Tab Take 1 Tab by mouth every day for 360 days. 90 Tab 3   • Misc. Devices Misc One shower chair Dx:Z74.09, Z91.81 1 Device 0   • ferrous sulfate 325 (65 Fe) MG tablet Take 1 Tab by mouth every day. 30 Tab 3   • predniSONE (DELTASONE) 10 MG Tab Take 5-30 mg by mouth every day. Take 30 mg daily for 5 days, 20 mg daily for 5 days, 10 mg daily for 5 days, 5 mg daily 14 days       No current facility-administered medications on file prior to visit.         PSHx:   Past Surgical History:   Procedure Laterality Date   • GASTRIC BYPASS  LAPAROSCOPIC  2000    Lucila en y   • CHOLECYSTECTOMY  2000       Family history   Family History   Problem Relation Age of Onset   • Hypertension Mother    • Arthritis Mother    • Depression Mother    • Cancer Father         bladder   • Schizophrenia Father    • Cancer Maternal Grandmother         breast   • Heart Disease Maternal Grandmother    • Psychiatric Illness Other    • Stroke Maternal Aunt    • Other Neg Hx         no connective tissue disorders or known aortic disease         Medications: reviewed on epic.   Outpatient Medications Marked as Taking for the 9/19/19 encounter (Office Visit) with Gerardo Gutierrez M.D.   Medication Sig Dispense Refill   • acetaminophen (TYLENOL) 500 MG Tab Take 2 Tabs by mouth 3 times a day as needed (pain.  Do not exceed 3000 mg of total acetaminophen per day). 180 Tab 6   • [START ON 10/16/2019] oxyCODONE immediate release (ROXICODONE) 10 MG immediate release tablet Take 1 Tab by mouth 3 times a day as needed for Severe Pain for up to 30 days. 90 Tab 0   • pregabalin (LYRICA) 25 MG Cap Take 1 Cap by mouth 3 times a day for 30 days. 90 Cap 0   • [START ON 10/19/2019] pregabalin (LYRICA) 25 MG Cap Take 1 Cap by mouth 3 times a day for 30 days. 90 Cap 0   • Naloxone (NARCAN) 4 MG/0.1ML Liquid Spray 4 mg in nose as needed (For severe sleepiness or difficulty breathing from possible overdose. Call 911 after administration.). 1 Package 1   • omeprazole (PRILOSEC) 20 MG delayed-release capsule Take 2 Caps by mouth 2 times a day. 60 Cap 3   • sucralfate (CARAFATE) 1 GM Tab Take 1 Tab by mouth 4 Times a Day,Before Meals and at Bedtime. 120 Tab 0   • Adalimumab (HUMIRA) 40 MG/0.4ML Prefilled Syringe Kit Inject 40 mg as instructed every 14 days.     • furosemide (LASIX) 20 MG Tab Take 1 Tab by mouth 2 times a day. 60 Tab 6   • ferrous sulfate 325 (65 Fe) MG tablet Take 325 mg by mouth every day.     • losartan (COZAAR) 25 MG Tab Take 1 Tab by mouth every day for 360 days. 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   Social Needs   • Financial resource strain: Not on file   • Food insecurity:     Worry: Not on file     Inability: Not on file   • Transportation needs:     Medical: Not on file     Non-medical: Not on file   Tobacco Use   • Smoking status: Current Every Day Smoker     Packs/day: 0.25     Years: 3.00     Pack years: 0.75     Types: Cigarettes, Cigars   • Smokeless tobacco: Never Used   • Tobacco comment: occasional cigar   Substance and Sexual Activity   • Alcohol use: No     Alcohol/week: 0.0 oz     Comment: quit 2.5 yrs ago   • Drug use: No   • Sexual activity: Yes     Partners: Female     Comment: ;    Lifestyle   • Physical activity:     Days per week: Not on file     Minutes per session: Not on file   • Stress: Not on file   Relationships   • Social connections:     Talks on phone: Not on file     Gets together: Not on file     Attends Oriental orthodox service: Not on file     Active member of club or organization: Not on file     Attends meetings of clubs or organizations: Not on file     Relationship status: Not on file   • Intimate partner violence:     Fear of current or ex partner: Not on file     Emotionally abused: Not on file     Physically abused: Not on file     Forced sexual activity: Not on file   Other Topics Concern   •  Service No   • Blood Transfusions Yes   • Caffeine Concern No   • Occupational Exposure No   • Hobby Hazards No   • Sleep Concern No   • Stress " "Concern Yes   • Weight Concern Yes   • Special Diet No   • Back Care No   • Exercise No   • Bike Helmet No   • Seat Belt Yes   • Self-Exams Yes   Social History Narrative    , lives in [place]         EXAMINATION     Physical Exam:   Vitals: /68 (BP Location: Right arm, Patient Position: Sitting, BP Cuff Size: Adult long)   Pulse 88   Temp 36.8 °C (98.2 °F) (Temporal)   Ht 1.803 m (5' 11\")   Wt 100.2 kg (221 lb)   SpO2 97%     Constitutional:   Body Habitus: Body mass index is 30.82 kg/m².  Cooperation: Fully cooperates with exam  Appearance: Well-groomed, well-nourished, not disheveled     Eyes: No scleral icterus to suggest severe liver disease, no proptosis to suggest severe hyperthyroid    ENT -no obvious auditory deficits, no obvious tongue lesions, tongue midline, no facial droop     Skin -no rashes or lesions noted     Respiratory-  breathing comfortable on room air, no audible wheezing    Cardiovascular- capillary refills less than 2 seconds. No lower extremity edema is noted.     Gastrointestinal - no obvious abdominal masses, No tenderness to palpation in the abdomen    Psychiatric- alert and oriented ×3. Normal affect.       Musculoskeletal -   Deferred exam of the right knee is currently in a brace and he has an upcoming knee replacement.    Thoracic/Lumbar Spine/Sacral Spine/Hips   Inspection: No evidence of atrophy in bilateral lower extremities throughout     ROM: decreased active range of motion with flexion, lateral flexion, and rotation bilaterally.   There is decreased active range of motion with lumbar extension.    There is pain with lumbar extension.   There is pain with facet loading maneuver (extension rotation) with axial low back pain on the BILATERAL side(s)    Palpation:   No tenderness to palpation in midline at T1-T12 levels. No tenderness to palpation in the left and right of the midline T1-L5, NEGATIVE for tenderness to palpation to the para-midline region in the " lower lumbar levels.  palpation over SI joint: negative bilaterally    palpation over buttock: negative bilaterally    palpation in hip or over the greater trochanters: negative bilaterally      Lumbar spine Special tests  Neuro tension  Straight leg test negative bilaterally    Slump test negative bilaterally      HIP  FAIR test negative bilaterally    Range of motion in the hips is within normal limits in flexion, extension, abduction, internal rotation, external rotation.    SI joint tests  Observation patient sits on one buttocks: Negative  SI joint compression negative bilaterally    SI joint distraction negative bilaterally    Thigh thrust test negative left    SHANA test negative left       Neuro       Key points for the international standards for neurological classification of spinal cord injury (ISNCSCI) to light touch.     Dermatome R L                                      L2 2 2   L3 2 2   L4 2 2   L5 2 2   S1 2 2   S2 2 2     Motor Exam Lower Extremities    ? Myotome R L   Hip flexion L2 def 5   Knee extension L3 def 5   Ankle dorsiflexion L4 5 5   Toe extension L5 5 5   Ankle plantarflexion S1 5 5       Clonus of the ankle negative bilaterally           MEDICAL DECISION MAKING    Medical records review: see under HPI section.     DATA    Labs:   Lab Results   Component Value Date/Time    SODIUM 140 09/13/2019 04:00 PM    POTASSIUM 3.5 (L) 09/13/2019 04:00 PM    CHLORIDE 107 09/13/2019 04:00 PM    CO2 23 09/13/2019 04:00 PM    ANION 10.0 09/13/2019 04:00 PM    GLUCOSE 73 09/13/2019 04:00 PM    BUN 10 09/13/2019 04:00 PM    CREATININE 0.78 09/13/2019 04:00 PM    CREATININE 0.95 10/19/2010 12:05 PM    CALCIUM 7.8 (L) 09/13/2019 04:00 PM    ASTSGOT 14 09/13/2019 04:00 PM    ALTSGPT 11 09/13/2019 04:00 PM    TBILIRUBIN 0.1 09/13/2019 04:00 PM    ALBUMIN 2.8 (L) 09/13/2019 04:00 PM    TOTPROTEIN 5.4 (L) 09/13/2019 04:00 PM    GLOBULIN 2.6 09/13/2019 04:00 PM    AGRATIO 1.1 09/13/2019 04:00 PM   ]    Lab  Results   Component Value Date/Time    PROTHROMBTM 13.4 09/13/2019 04:00 PM    INR 1.01 09/13/2019 04:00 PM        Lab Results   Component Value Date/Time    WBC 6.4 09/15/2019 04:24 AM    WBC 8.4 10/19/2010 12:05 PM    RBC 3.43 (L) 09/15/2019 04:24 AM    RBC 4.96 10/19/2010 12:05 PM    HEMOGLOBIN 8.4 (L) 09/15/2019 04:24 AM    HEMATOCRIT 27.7 (L) 09/15/2019 04:24 AM    MCV 80.8 (L) 09/15/2019 04:24 AM    MCV 85 10/19/2010 12:05 PM    MCH 24.5 (L) 09/15/2019 04:24 AM    MCH 28.4 10/19/2010 12:05 PM    MCHC 30.3 (L) 09/15/2019 04:24 AM    MPV 9.1 09/15/2019 04:24 AM    NEUTSPOLYS 56.60 09/15/2019 04:24 AM    LYMPHOCYTES 31.20 09/15/2019 04:24 AM    MONOCYTES 9.40 09/15/2019 04:24 AM    EOSINOPHILS 1.70 09/15/2019 04:24 AM    BASOPHILS 0.80 09/15/2019 04:24 AM    HYPOCHROMIA 1+ 09/13/2019 07:29 PM        No results found for: HBA1C     Imaging:   I personally reviewed following images, these are my reads  X-ray lumbar spine 9/16/2019.  Lumbar scoliosis.  Degenerative changes in the lumbar spine.  Possible transitional anatomy as there is suboptimal views of the lumbosacral junction secondary to bowel gas.    IMAGING radiology reads. I reviewed the following radiology reads         Results for orders placed during the hospital encounter of 06/07/11   MR-BRAIN-WITH & W/O    Impression 1.  Normal MRI scan of the brain with and without contrast enhancement.                                     Results for orders placed during the hospital encounter of 12/30/09   MR-LUMBAR SPINE-W/O    Impression IMPRESSION:     1. MILD DISCAL DEGENERATIVE CHANGE AT THE L3-4 AND L4-5 LEVELS.    2. NO EVIDENCE OF DISC EXTRUSION AND NO EVIDENCE OF CENTRAL CANAL OR   NEURAL FORAMINAL STENOSIS.                    Results for orders placed during the hospital encounter of 12/30/09   MR-LUMBAR SPINE-W/O    Impression IMPRESSION:     1. MILD DISCAL DEGENERATIVE CHANGE AT THE L3-4 AND L4-5 LEVELS.    2. NO EVIDENCE OF DISC EXTRUSION AND NO EVIDENCE  OF CENTRAL CANAL OR   NEURAL FORAMINAL STENOSIS.                                                                     Results for orders placed during the hospital encounter of 08/07/08   DX-ANKLE 3+ VIEWS    Impression IMPRESSION:    NORMAL LEFT ANKLE SERIES.      TRB/sbh    Read By ASHLEY HUSAIN MD on Aug  7 2008  6:52PM  : SHARRI Transcription Date: Aug  8 2008 10:10AM  THIS DOCUMENT HAS BEEN ELECTRONICALLY SIGNED BY: ASHLEY HUSAIN MD on   Aug  8 2008  4:42PM               Results for orders placed during the hospital encounter of 05/07/09   DX-CHEST-2 VIEWS    Impression IMPRESSION:    NORMAL CHEST.     JHW/danie          Read By TABITHA SUERO MD on May  7 2009  3:54PM  : FRANKLIN Transcription Date: May  7 2009  4:28PM  THIS DOCUMENT HAS BEEN ELECTRONICALLY SIGNED BY: TABITHA SUERO MD on May    7 2009  4:37PM                     Results for orders placed in visit on 02/09/16   DX-FOOT-COMPLETE 3+ RIGHT    Impression 1.  Flatfoot deformity.    2.  Mild DJD.        Results for orders placed in visit on 02/09/16   DX-HAND 2- RIGHT    Impression Normal exam.                          Results for orders placed during the hospital encounter of 09/16/19   DX-LUMBAR SPINE-2 OR 3 VIEWS    Impression Dextroconvex lumbar curvature.  No acute osseous abnormality.  No malalignment.                            Results for orders placed during the hospital encounter of 09/16/19   DX-THORACIC SPINE-WITH SWIMMERS VIEW    Impression Mild chronic height loss of the mid thoracic spine from T9 to T11.                   Diagnosis   Visit Diagnoses     ICD-10-CM   1. Rheumatoid arthritis involving multiple sites, unspecified rheumatoid factor presence ([facility]) M06.9   2. Obesity (BMI 35.0-39.9 without comorbidity) ([facility]) E66.9   3. MONICA (obstructive sleep apnea) G47.33   4. Iron deficiency anemia, unspecified iron deficiency anemia type D50.9   5. Chronic midline low back pain without sciatica M54.5    G89.29   6.  Chronic knee pain, unspecified laterality M25.569    G89.29   7. Arthritis of knee M17.10   8. Chronic, continuous use of opioids F11.90   9. Alcohol abuse F10.10   10. History of gastric bypass, avoid NSAIDs Z98.84   11. History of gastric ulcer, avoid NSAIDs Z87.19   12. Chronic pain of right knee M25.561    G89.29           ASSESSMENT AND PLAN:  Richard Hubbard II 44 y.o. male       was seen today for new patient.    Diagnoses and all orders for this visit:    Rheumatoid arthritis involving multiple sites, unspecified rheumatoid factor presence (HCC)  -     REFERRAL TO BEHAVIORAL HEALTH  -     Pain Management Screen  -     oxyCODONE immediate release (ROXICODONE) 10 MG immediate release tablet; Take 1 Tab by mouth 3 times a day as needed for Severe Pain for up to 30 days.  -     pregabalin (LYRICA) 25 MG Cap; Take 1 Cap by mouth 3 times a day for 30 days.  -     pregabalin (LYRICA) 25 MG Cap; Take 1 Cap by mouth 3 times a day for 30 days.  -     Consent for Opiate Prescription  -     Controlled Substance Treatment Agreement  -     Naloxone (NARCAN) 4 MG/0.1ML Liquid; Spray 4 mg in nose as needed (For severe sleepiness or difficulty breathing from possible overdose. Call 911 after administration.).    Obesity (BMI 35.0-39.9 without comorbidity) ([facility])    MONICA (obstructive sleep apnea)    Iron deficiency anemia, unspecified iron deficiency anemia type    Chronic midline low back pain without sciatica  -     REFERRAL TO BEHAVIORAL HEALTH  -     Pain Management Screen  -     oxyCODONE immediate release (ROXICODONE) 10 MG immediate release tablet; Take 1 Tab by mouth 3 times a day as needed for Severe Pain for up to 30 days.  -     pregabalin (LYRICA) 25 MG Cap; Take 1 Cap by mouth 3 times a day for 30 days.  -     pregabalin (LYRICA) 25 MG Cap; Take 1 Cap by mouth 3 times a day for 30 days.  -     Consent for Opiate Prescription  -     Controlled Substance Treatment Agreement  -     Naloxone (NARCAN) 4  MG/0.1ML Liquid; Spray 4 mg in nose as needed (For severe sleepiness or difficulty breathing from possible overdose. Call 911 after administration.).    Chronic knee pain, unspecified laterality  -     REFERRAL TO BEHAVIORAL HEALTH  -     Pain Management Screen  -     oxyCODONE immediate release (ROXICODONE) 10 MG immediate release tablet; Take 1 Tab by mouth 3 times a day as needed for Severe Pain for up to 30 days.  -     pregabalin (LYRICA) 25 MG Cap; Take 1 Cap by mouth 3 times a day for 30 days.  -     pregabalin (LYRICA) 25 MG Cap; Take 1 Cap by mouth 3 times a day for 30 days.  -     Consent for Opiate Prescription  -     Controlled Substance Treatment Agreement  -     Naloxone (NARCAN) 4 MG/0.1ML Liquid; Spray 4 mg in nose as needed (For severe sleepiness or difficulty breathing from possible overdose. Call 911 after administration.).    Arthritis of knee  -     REFERRAL TO BEHAVIORAL HEALTH  -     Pain Management Screen  -     oxyCODONE immediate release (ROXICODONE) 10 MG immediate release tablet; Take 1 Tab by mouth 3 times a day as needed for Severe Pain for up to 30 days.  -     pregabalin (LYRICA) 25 MG Cap; Take 1 Cap by mouth 3 times a day for 30 days.  -     pregabalin (LYRICA) 25 MG Cap; Take 1 Cap by mouth 3 times a day for 30 days.  -     Consent for Opiate Prescription  -     Controlled Substance Treatment Agreement  -     Naloxone (NARCAN) 4 MG/0.1ML Liquid; Spray 4 mg in nose as needed (For severe sleepiness or difficulty breathing from possible overdose. Call 911 after administration.).    Chronic, continuous use of opioids  -     REFERRAL TO BEHAVIORAL HEALTH  -     oxyCODONE immediate release (ROXICODONE) 10 MG immediate release tablet; Take 1 Tab by mouth 3 times a day as needed for Severe Pain for up to 30 days.  -     Consent for Opiate Prescription  -     Controlled Substance Treatment Agreement  -     Naloxone (NARCAN) 4 MG/0.1ML Liquid; Spray 4 mg in nose as needed (For severe  sleepiness or difficulty breathing from possible overdose. Call 911 after administration.).    Alcohol abuse  -     REFERRAL TO BEHAVIORAL HEALTH    History of gastric bypass, avoid NSAIDs    History of gastric ulcer, avoid NSAIDs    Chronic pain of right knee  -     oxyCODONE immediate release (ROXICODONE) 10 MG immediate release tablet; Take 1 Tab by mouth 3 times a day as needed for Severe Pain for up to 30 days.  -     pregabalin (LYRICA) 25 MG Cap; Take 1 Cap by mouth 3 times a day for 30 days.  -     pregabalin (LYRICA) 25 MG Cap; Take 1 Cap by mouth 3 times a day for 30 days.  -     Consent for Opiate Prescription  -     Controlled Substance Treatment Agreement  -     Naloxone (NARCAN) 4 MG/0.1ML Liquid; Spray 4 mg in nose as needed (For severe sleepiness or difficulty breathing from possible overdose. Call 911 after administration.).    Other orders  -     acetaminophen (TYLENOL) 500 MG Tab; Take 2 Tabs by mouth 3 times a day as needed (pain.  Do not exceed 3000 mg of total acetaminophen per day).      We discussed avoiding NSAIDs given his history.  Okay to take Tylenol 1000 mg 3 times daily.  The patient does have a history of alcoholism but reports being clean and sober for 3-1/2 years.  His wife manages his medications in a locked box.  Given the severity of the patient's knee pain and upcoming surgery which is required it is reasonable to have him on pain medications with oxycodone 10 mg 3 times daily.  Robaxin was not helping so I discontinued this.  Tried gabapentin in the past but was not able to tolerate this medication.  Started on low-dose Lyrica 25 mg 3 times daily.  Given the history of alcoholism and bipolar disorder, the patient does have upcoming visits with psychiatry.  I also placed a referral to psychology.  He is amenable to these.  He also has physical therapy upcoming.      The patient's postoperative pain medications will be handled by his surgeon.    We also discussed the plan will be  to titrate off of pain medications as soon as possible.    Last opioid risk scale: 9/19/2019   Last urine drug screen: 9/19/2019, pending  Last controlled substance agreement: 9/19/2019        Opioid Risk Score: 16    Interpretation of Opioid Risk Score   Score 0-3 = Low risk of abuse. Do UDS at least once per year.  Score 4-7 = Moderate risk of abuse. Do UDS 1-4 times per year.  Score 8+ = High risk of abuse. Refer to specialist.     I reviewed the     In prescribing controlled substances to this patient, I certify that I have obtained and reviewed the medical history of Richard Hubbard II. I have also made a good gabe effort to obtain applicable records from other providers who have treated the patient and records did not demonstrate any increased risk of substance abuse that would prevent me from prescribing controlled substances.     I have conducted a physical exam and documented it. I have reviewed Mr. Hubbard’s prescription history as maintained by the [place] Prescription Monitoring Program.     I have assessed the patient’s risk for abuse, dependency, and addiction using the validated Opioid Risk Tool available at https://www.mdcalc.com/qeqryf-muye-ynbo-ort-narcotic-abuse.     Given the above, I believe the benefits of controlled substance therapy outweigh the risks. The reasons for prescribing controlled substances include non-narcotic, oral analgesic alternatives have been inadequate for pain control. Accordingly, I have discussed the risk and benefits, treatment plan, and alternative therapies with the patient.               Outside records requested:  The patient signed outside records request form for his outside records including outside images. [facility].       Follow-up: 1 month        Please note that this dictation was created using voice recognition software. I have made every reasonable attempt to correct obvious errors but there may be errors of grammar and content that I  may have overlooked prior to finalization of this note.      Gerardo Gutierrez MD  Physical Medicine and Rehabilitation  Interventional Spine and Sports Physiatry  [facility]               CC Nani Resendiz P.A.*   CC JOSIANE Turner.

## 2019-09-24 ENCOUNTER — TELEPHONE (OUTPATIENT)
Dept: VASCULAR LAB | Facility: MEDICAL CENTER | Age: 44
End: 2019-09-24

## 2019-09-24 RX ORDER — POTASSIUM CHLORIDE 20 MEQ/1
20 TABLET, EXTENDED RELEASE ORAL 2 TIMES DAILY
Qty: 60 TAB | Refills: 11 | Status: ON HOLD | OUTPATIENT
Start: 2019-09-24 | End: 2019-10-17

## 2019-09-27 ENCOUNTER — HOSPITAL ENCOUNTER (INPATIENT)
Facility: MEDICAL CENTER | Age: 44
LOS: 14 days | DRG: 515 | End: 2019-10-17
Attending: EMERGENCY MEDICINE | Admitting: HOSPITALIST
Payer: COMMERCIAL

## 2019-09-27 DIAGNOSIS — M05.741 RHEUMATOID ARTHRITIS INVOLVING BOTH HANDS WITH POSITIVE RHEUMATOID FACTOR (HCC): Chronic | ICD-10-CM

## 2019-09-27 DIAGNOSIS — S30.1XXA HEMATOMA OF GROIN, INITIAL ENCOUNTER: ICD-10-CM

## 2019-09-27 DIAGNOSIS — L02.416 ABSCESS OF LEFT THIGH: ICD-10-CM

## 2019-09-27 DIAGNOSIS — S32.512A CLOSED FRACTURE OF LEFT SUPERIOR PUBIC RAMUS, INITIAL ENCOUNTER: ICD-10-CM

## 2019-09-27 DIAGNOSIS — M86.10 OTHER ACUTE OSTEOMYELITIS, UNSPECIFIED SITE (HCC): ICD-10-CM

## 2019-09-27 DIAGNOSIS — M05.742 RHEUMATOID ARTHRITIS INVOLVING BOTH HANDS WITH POSITIVE RHEUMATOID FACTOR (HCC): Chronic | ICD-10-CM

## 2019-09-27 PROCEDURE — 99285 EMERGENCY DEPT VISIT HI MDM: CPT

## 2019-09-28 ENCOUNTER — APPOINTMENT (OUTPATIENT)
Dept: RADIOLOGY | Facility: MEDICAL CENTER | Age: 44
DRG: 515 | End: 2019-09-28
Attending: EMERGENCY MEDICINE
Payer: COMMERCIAL

## 2019-09-28 ENCOUNTER — APPOINTMENT (OUTPATIENT)
Dept: RADIOLOGY | Facility: MEDICAL CENTER | Age: 44
DRG: 515 | End: 2019-09-28
Attending: HOSPITALIST
Payer: COMMERCIAL

## 2019-09-28 PROBLEM — S32.599A PUBIC RAMUS FRACTURE (HCC): Status: ACTIVE | Noted: 2019-09-28

## 2019-09-28 LAB
ALBUMIN SERPL BCP-MCNC: 2.3 G/DL (ref 3.2–4.9)
ALBUMIN/GLOB SERPL: 0.7 G/DL
ALP SERPL-CCNC: 175 U/L (ref 30–99)
ALT SERPL-CCNC: 12 U/L (ref 2–50)
ANION GAP SERPL CALC-SCNC: 10 MMOL/L (ref 0–11.9)
ANION GAP SERPL CALC-SCNC: 9 MMOL/L (ref 0–11.9)
ANISOCYTOSIS BLD QL SMEAR: ABNORMAL
APPEARANCE UR: CLEAR
AST SERPL-CCNC: 15 U/L (ref 12–45)
BASOPHILS # BLD AUTO: 0.3 % (ref 0–1.8)
BASOPHILS # BLD: 0.03 K/UL (ref 0–0.12)
BILIRUB SERPL-MCNC: 0.4 MG/DL (ref 0.1–1.5)
BILIRUB UR QL STRIP.AUTO: NEGATIVE
BUN SERPL-MCNC: 14 MG/DL (ref 8–22)
BUN SERPL-MCNC: 16 MG/DL (ref 8–22)
CALCIUM SERPL-MCNC: 7.9 MG/DL (ref 8.4–10.2)
CALCIUM SERPL-MCNC: 8.1 MG/DL (ref 8.4–10.2)
CHLORIDE SERPL-SCNC: 104 MMOL/L (ref 96–112)
CHLORIDE SERPL-SCNC: 105 MMOL/L (ref 96–112)
CO2 SERPL-SCNC: 23 MMOL/L (ref 20–33)
CO2 SERPL-SCNC: 24 MMOL/L (ref 20–33)
COLOR UR: YELLOW
COMMENT 1642: NORMAL
CREAT SERPL-MCNC: 0.67 MG/DL (ref 0.5–1.4)
CREAT SERPL-MCNC: 0.74 MG/DL (ref 0.5–1.4)
CRP SERPL HS-MCNC: 18.9 MG/DL (ref 0–0.75)
EOSINOPHIL # BLD AUTO: 0.02 K/UL (ref 0–0.51)
EOSINOPHIL NFR BLD: 0.2 % (ref 0–6.9)
ERYTHROCYTE [DISTWIDTH] IN BLOOD BY AUTOMATED COUNT: 65.9 FL (ref 35.9–50)
GLOBULIN SER CALC-MCNC: 3.5 G/DL (ref 1.9–3.5)
GLUCOSE SERPL-MCNC: 104 MG/DL (ref 65–99)
GLUCOSE SERPL-MCNC: 81 MG/DL (ref 65–99)
GLUCOSE UR STRIP.AUTO-MCNC: NEGATIVE MG/DL
HCT VFR BLD AUTO: 35.5 % (ref 42–52)
HGB BLD-MCNC: 10.8 G/DL (ref 14–18)
IMM GRANULOCYTES # BLD AUTO: 0.07 K/UL (ref 0–0.11)
IMM GRANULOCYTES NFR BLD AUTO: 0.7 % (ref 0–0.9)
KETONES UR STRIP.AUTO-MCNC: NEGATIVE MG/DL
LEUKOCYTE ESTERASE UR QL STRIP.AUTO: NEGATIVE
LYMPHOCYTES # BLD AUTO: 1.08 K/UL (ref 1–4.8)
LYMPHOCYTES NFR BLD: 10.1 % (ref 22–41)
MACROCYTES BLD QL SMEAR: ABNORMAL
MCH RBC QN AUTO: 26.3 PG (ref 27–33)
MCHC RBC AUTO-ENTMCNC: 30.4 G/DL (ref 33.7–35.3)
MCV RBC AUTO: 86.6 FL (ref 81.4–97.8)
MICRO URNS: NORMAL
MICROCYTES BLD QL SMEAR: ABNORMAL
MONOCYTES # BLD AUTO: 0.72 K/UL (ref 0–0.85)
MONOCYTES NFR BLD AUTO: 6.8 % (ref 0–13.4)
NEUTROPHILS # BLD AUTO: 8.73 K/UL (ref 1.82–7.42)
NEUTROPHILS NFR BLD: 81.9 % (ref 44–72)
NITRITE UR QL STRIP.AUTO: NEGATIVE
NRBC # BLD AUTO: 0 K/UL
NRBC BLD-RTO: 0 /100 WBC
OVALOCYTES BLD QL SMEAR: NORMAL
PH UR STRIP.AUTO: 8 [PH] (ref 5–8)
PLATELET # BLD AUTO: 266 K/UL (ref 164–446)
PLATELET BLD QL SMEAR: NORMAL
PMV BLD AUTO: 8.7 FL (ref 9–12.9)
POIKILOCYTOSIS BLD QL SMEAR: NORMAL
POLYCHROMASIA BLD QL SMEAR: NORMAL
POTASSIUM SERPL-SCNC: 3.6 MMOL/L (ref 3.6–5.5)
POTASSIUM SERPL-SCNC: 3.8 MMOL/L (ref 3.6–5.5)
PROT SERPL-MCNC: 5.8 G/DL (ref 6–8.2)
PROT UR QL STRIP: NEGATIVE MG/DL
RBC # BLD AUTO: 4.1 M/UL (ref 4.7–6.1)
RBC BLD AUTO: PRESENT
RBC UR QL AUTO: NEGATIVE
SODIUM SERPL-SCNC: 136 MMOL/L (ref 135–145)
SODIUM SERPL-SCNC: 139 MMOL/L (ref 135–145)
SP GR UR STRIP.AUTO: 1.01
WBC # BLD AUTO: 10.7 K/UL (ref 4.8–10.8)

## 2019-09-28 PROCEDURE — 700111 HCHG RX REV CODE 636 W/ 250 OVERRIDE (IP): Performed by: HOSPITALIST

## 2019-09-28 PROCEDURE — 93971 EXTREMITY STUDY: CPT | Mod: LT

## 2019-09-28 PROCEDURE — 72193 CT PELVIS W/DYE: CPT

## 2019-09-28 PROCEDURE — 700111 HCHG RX REV CODE 636 W/ 250 OVERRIDE (IP)

## 2019-09-28 PROCEDURE — 99219 PR INITIAL OBSERVATION CARE,LEVL II: CPT | Performed by: HOSPITALIST

## 2019-09-28 PROCEDURE — 96376 TX/PRO/DX INJ SAME DRUG ADON: CPT

## 2019-09-28 PROCEDURE — A9270 NON-COVERED ITEM OR SERVICE: HCPCS | Performed by: HOSPITALIST

## 2019-09-28 PROCEDURE — 96375 TX/PRO/DX INJ NEW DRUG ADDON: CPT

## 2019-09-28 PROCEDURE — 72146 MRI CHEST SPINE W/O DYE: CPT

## 2019-09-28 PROCEDURE — 72131 CT LUMBAR SPINE W/O DYE: CPT

## 2019-09-28 PROCEDURE — G0378 HOSPITAL OBSERVATION PER HR: HCPCS

## 2019-09-28 PROCEDURE — 700102 HCHG RX REV CODE 250 W/ 637 OVERRIDE(OP): Performed by: HOSPITALIST

## 2019-09-28 PROCEDURE — 86140 C-REACTIVE PROTEIN: CPT

## 2019-09-28 PROCEDURE — 80048 BASIC METABOLIC PNL TOTAL CA: CPT

## 2019-09-28 PROCEDURE — 85025 COMPLETE CBC W/AUTO DIFF WBC: CPT

## 2019-09-28 PROCEDURE — 700111 HCHG RX REV CODE 636 W/ 250 OVERRIDE (IP): Performed by: EMERGENCY MEDICINE

## 2019-09-28 PROCEDURE — 700117 HCHG RX CONTRAST REV CODE 255: Performed by: EMERGENCY MEDICINE

## 2019-09-28 PROCEDURE — 81003 URINALYSIS AUTO W/O SCOPE: CPT

## 2019-09-28 PROCEDURE — 80053 COMPREHEN METABOLIC PANEL: CPT

## 2019-09-28 PROCEDURE — 72148 MRI LUMBAR SPINE W/O DYE: CPT

## 2019-09-28 PROCEDURE — 96374 THER/PROPH/DIAG INJ IV PUSH: CPT

## 2019-09-28 PROCEDURE — 36415 COLL VENOUS BLD VENIPUNCTURE: CPT

## 2019-09-28 RX ORDER — MORPHINE SULFATE 4 MG/ML
4 INJECTION, SOLUTION INTRAMUSCULAR; INTRAVENOUS EVERY 4 HOURS PRN
Status: DISCONTINUED | OUTPATIENT
Start: 2019-09-28 | End: 2019-10-16

## 2019-09-28 RX ORDER — POLYETHYLENE GLYCOL 3350 17 G/17G
1 POWDER, FOR SOLUTION ORAL
Status: DISCONTINUED | OUTPATIENT
Start: 2019-09-28 | End: 2019-10-17 | Stop reason: HOSPADM

## 2019-09-28 RX ORDER — PREGABALIN 25 MG/1
25 CAPSULE ORAL 3 TIMES DAILY
Status: DISCONTINUED | OUTPATIENT
Start: 2019-09-28 | End: 2019-10-17 | Stop reason: HOSPADM

## 2019-09-28 RX ORDER — AMOXICILLIN 250 MG
2 CAPSULE ORAL 2 TIMES DAILY
Status: DISCONTINUED | OUTPATIENT
Start: 2019-09-28 | End: 2019-10-17 | Stop reason: HOSPADM

## 2019-09-28 RX ORDER — ACETAMINOPHEN 325 MG/1
650 TABLET ORAL EVERY 6 HOURS PRN
Status: DISCONTINUED | OUTPATIENT
Start: 2019-09-28 | End: 2019-10-16

## 2019-09-28 RX ORDER — HYDROMORPHONE HYDROCHLORIDE 1 MG/ML
1 INJECTION, SOLUTION INTRAMUSCULAR; INTRAVENOUS; SUBCUTANEOUS ONCE
Status: DISCONTINUED | OUTPATIENT
Start: 2019-09-28 | End: 2019-09-28

## 2019-09-28 RX ORDER — HYDROMORPHONE HYDROCHLORIDE 1 MG/ML
0.5 INJECTION, SOLUTION INTRAMUSCULAR; INTRAVENOUS; SUBCUTANEOUS ONCE
Status: COMPLETED | OUTPATIENT
Start: 2019-09-28 | End: 2019-09-28

## 2019-09-28 RX ORDER — HYDROMORPHONE HYDROCHLORIDE 1 MG/ML
INJECTION, SOLUTION INTRAMUSCULAR; INTRAVENOUS; SUBCUTANEOUS
Status: DISPENSED
Start: 2019-09-28 | End: 2019-09-28

## 2019-09-28 RX ORDER — LOSARTAN POTASSIUM 25 MG/1
25 TABLET ORAL DAILY
Status: DISCONTINUED | OUTPATIENT
Start: 2019-09-28 | End: 2019-10-17 | Stop reason: HOSPADM

## 2019-09-28 RX ORDER — HYDROMORPHONE HYDROCHLORIDE 1 MG/ML
0.5 INJECTION, SOLUTION INTRAMUSCULAR; INTRAVENOUS; SUBCUTANEOUS
Status: DISCONTINUED | OUTPATIENT
Start: 2019-09-28 | End: 2019-10-03

## 2019-09-28 RX ORDER — NICOTINE 21 MG/24HR
14 PATCH, TRANSDERMAL 24 HOURS TRANSDERMAL
Status: DISCONTINUED | OUTPATIENT
Start: 2019-09-28 | End: 2019-10-17 | Stop reason: HOSPADM

## 2019-09-28 RX ORDER — BISACODYL 10 MG
10 SUPPOSITORY, RECTAL RECTAL
Status: DISCONTINUED | OUTPATIENT
Start: 2019-09-28 | End: 2019-10-17 | Stop reason: HOSPADM

## 2019-09-28 RX ORDER — MORPHINE SULFATE 15 MG/1
15 TABLET, FILM COATED, EXTENDED RELEASE ORAL EVERY 12 HOURS
Status: DISCONTINUED | OUTPATIENT
Start: 2019-09-28 | End: 2019-09-30

## 2019-09-28 RX ORDER — FERROUS SULFATE 325(65) MG
325 TABLET ORAL DAILY
Status: DISCONTINUED | OUTPATIENT
Start: 2019-09-28 | End: 2019-10-17 | Stop reason: HOSPADM

## 2019-09-28 RX ORDER — OMEPRAZOLE 20 MG/1
40 CAPSULE, DELAYED RELEASE ORAL 2 TIMES DAILY
Status: DISCONTINUED | OUTPATIENT
Start: 2019-09-28 | End: 2019-10-17 | Stop reason: HOSPADM

## 2019-09-28 RX ORDER — FUROSEMIDE 20 MG/1
20 TABLET ORAL
Status: DISCONTINUED | OUTPATIENT
Start: 2019-09-28 | End: 2019-10-17 | Stop reason: HOSPADM

## 2019-09-28 RX ADMIN — MORPHINE SULFATE 4 MG: 4 INJECTION INTRAVENOUS at 21:53

## 2019-09-28 RX ADMIN — HYDROMORPHONE HYDROCHLORIDE 0.5 MG: 1 INJECTION, SOLUTION INTRAMUSCULAR; INTRAVENOUS; SUBCUTANEOUS at 16:18

## 2019-09-28 RX ADMIN — HYDROMORPHONE HYDROCHLORIDE 0.5 MG: 1 INJECTION, SOLUTION INTRAMUSCULAR; INTRAVENOUS; SUBCUTANEOUS at 05:27

## 2019-09-28 RX ADMIN — MORPHINE SULFATE 15 MG: 15 TABLET, EXTENDED RELEASE ORAL at 10:51

## 2019-09-28 RX ADMIN — OMEPRAZOLE 40 MG: 20 CAPSULE, DELAYED RELEASE ORAL at 07:32

## 2019-09-28 RX ADMIN — SENNOSIDES, DOCUSATE SODIUM 2 TABLET: 50; 8.6 TABLET, FILM COATED ORAL at 16:18

## 2019-09-28 RX ADMIN — OMEPRAZOLE 40 MG: 20 CAPSULE, DELAYED RELEASE ORAL at 16:18

## 2019-09-28 RX ADMIN — FUROSEMIDE 20 MG: 40 TABLET ORAL at 16:18

## 2019-09-28 RX ADMIN — HYDROMORPHONE HYDROCHLORIDE 0.5 MG: 1 INJECTION, SOLUTION INTRAMUSCULAR; INTRAVENOUS; SUBCUTANEOUS at 01:47

## 2019-09-28 RX ADMIN — HYDROMORPHONE HYDROCHLORIDE 0.5 MG: 1 INJECTION, SOLUTION INTRAMUSCULAR; INTRAVENOUS; SUBCUTANEOUS at 20:47

## 2019-09-28 RX ADMIN — PREGABALIN 25 MG: 25 CAPSULE ORAL at 07:32

## 2019-09-28 RX ADMIN — FERROUS SULFATE TAB 325 MG (65 MG ELEMENTAL FE) 325 MG: 325 (65 FE) TAB at 07:32

## 2019-09-28 RX ADMIN — LOSARTAN POTASSIUM 25 MG: 25 TABLET, FILM COATED ORAL at 07:32

## 2019-09-28 RX ADMIN — FUROSEMIDE 20 MG: 40 TABLET ORAL at 07:32

## 2019-09-28 RX ADMIN — IOHEXOL 100 ML: 350 INJECTION, SOLUTION INTRAVENOUS at 03:48

## 2019-09-28 RX ADMIN — NICOTINE 14 MG: 14 PATCH, EXTENDED RELEASE TRANSDERMAL at 10:51

## 2019-09-28 RX ADMIN — PREGABALIN 25 MG: 25 CAPSULE ORAL at 16:18

## 2019-09-28 RX ADMIN — HYDROMORPHONE HYDROCHLORIDE 0.5 MG: 1 INJECTION, SOLUTION INTRAMUSCULAR; INTRAVENOUS; SUBCUTANEOUS at 04:15

## 2019-09-28 RX ADMIN — HYDROMORPHONE HYDROCHLORIDE 0.5 MG: 1 INJECTION, SOLUTION INTRAMUSCULAR; INTRAVENOUS; SUBCUTANEOUS at 10:51

## 2019-09-28 ASSESSMENT — ENCOUNTER SYMPTOMS
CHILLS: 0
HEADACHES: 0
SHORTNESS OF BREATH: 0
DEPRESSION: 0
NECK PAIN: 0
MYALGIAS: 1
BACK PAIN: 1
FEVER: 0
FOCAL WEAKNESS: 0
SEIZURES: 0
ABDOMINAL PAIN: 0
VOMITING: 0
EYE REDNESS: 0
WEAKNESS: 0
DOUBLE VISION: 0
BRUISES/BLEEDS EASILY: 0
DIZZINESS: 0
NAUSEA: 0
COUGH: 0
SORE THROAT: 0
BLURRED VISION: 0
PALPITATIONS: 0
FALLS: 1
INSOMNIA: 0

## 2019-09-28 ASSESSMENT — LIFESTYLE VARIABLES
TOTAL SCORE: 0
AVERAGE NUMBER OF DAYS PER WEEK YOU HAVE A DRINK CONTAINING ALCOHOL: 0
EVER HAD A DRINK FIRST THING IN THE MORNING TO STEADY YOUR NERVES TO GET RID OF A HANGOVER: NO
ON A TYPICAL DAY WHEN YOU DRINK ALCOHOL HOW MANY DRINKS DO YOU HAVE: 0
EVER FELT BAD OR GUILTY ABOUT YOUR DRINKING: NO
HOW MANY TIMES IN THE PAST YEAR HAVE YOU HAD 5 OR MORE DRINKS IN A DAY: 0
HAVE YOU EVER FELT YOU SHOULD CUT DOWN ON YOUR DRINKING: NO
CONSUMPTION TOTAL: NEGATIVE
TOTAL SCORE: 0
ALCOHOL_USE: NO
HAVE PEOPLE ANNOYED YOU BY CRITICIZING YOUR DRINKING: NO
DOES PATIENT WANT TO STOP DRINKING: NO
TOTAL SCORE: 0

## 2019-09-28 ASSESSMENT — COGNITIVE AND FUNCTIONAL STATUS - GENERAL
MOVING FROM LYING ON BACK TO SITTING ON SIDE OF FLAT BED: A LOT
EATING MEALS: A LITTLE
PERSONAL GROOMING: A LITTLE
TURNING FROM BACK TO SIDE WHILE IN FLAT BAD: A LOT
TOILETING: A LOT
DRESSING REGULAR LOWER BODY CLOTHING: A LOT
SUGGESTED CMS G CODE MODIFIER MOBILITY: CL
DAILY ACTIVITIY SCORE: 15
WALKING IN HOSPITAL ROOM: A LOT
MOVING TO AND FROM BED TO CHAIR: A LOT
STANDING UP FROM CHAIR USING ARMS: A LOT
SUGGESTED CMS G CODE MODIFIER DAILY ACTIVITY: CK
DRESSING REGULAR UPPER BODY CLOTHING: A LITTLE
CLIMB 3 TO 5 STEPS WITH RAILING: A LOT
HELP NEEDED FOR BATHING: A LOT
MOBILITY SCORE: 12

## 2019-09-28 NOTE — CARE PLAN
Problem: Communication  Goal: The ability to communicate needs accurately and effectively will improve  Outcome: PROGRESSING AS EXPECTED     Problem: Safety  Goal: Will remain free from falls  Outcome: PROGRESSING AS EXPECTED     Problem: Knowledge Deficit  Goal: Knowledge of the prescribed therapeutic regimen will improve  Outcome: PROGRESSING AS EXPECTED     Problem: Pain Management  Goal: Pain level will decrease to patient's comfort goal  Outcome: PROGRESSING AS EXPECTED  Intervention: Follow pain managment plan developed in collaboration with patient and Interdisciplinary Team  Note:   IV dilaudid per MAR.

## 2019-09-28 NOTE — PROGRESS NOTES
2 RN skin assessment completed.  Rough reddened areas noted to bilateral elbows.  Red rash noted throughout body and groin.  Patient reported that he was supposed to go back to the doctor when this rash reappeared.  Edema noted to bilateral lower extremities.  Edematous area noted on left inner thigh.  All other skin intact.

## 2019-09-28 NOTE — CONSULTS
Date of Service: 09/28/19    CC: Left groin pain and immobility    HPI: Richard Hubbard II is a 44 y.o. male who presents with complaints of pain to left groin and pelvis.  This started 1 month ago after ground-level fall, he did the splits and felt like his left leg went out sideways.  The pain is 9/10 and is described as sharp.  The pain is made worse by palpation of the area and made better by rest and immobilization.    He was hospitalized recently for gastric ulcer that led to anemia a month and a half ago.    After his fall 1 month ago he was seeing some improvement a few days back and the pain acutely worsened last night requiring him to present to the emergency department.    PMH:   Past Medical History:   Diagnosis Date   • ADD (attention deficit disorder)    • Alcohol abuse    • Allergic rhinitis 5/21/2009   • Anemia 09/2019   • Anxiety 5/21/2009   • Back pain 5/21/2009   • Bipolar disorder (HCC)    • Bleeding ulcer 5/21/2009   • Depression 5/21/2009   • Eczema 5/21/2009   • History of bleeding ulcers 2/12/2015   • Hypertension    • Insomnia 5/21/2009   • Migraine 5/21/2009   • Obesity, morbid (HCC) 5/21/2009   • Pain 09/2019    Chronic knee and back pain   • Rheumatoid arteritis 09/2019    knee, feet   • Sleep apnea 5/21/2009    Did not tolerate cpap, does not use it   • Snoring        PSH:   Past Surgical History:   Procedure Laterality Date   • GASTRIC BYPASS LAPAROSCOPIC  2000    Lucila en y   • CHOLECYSTECTOMY  2000       FH:   Family History   Problem Relation Age of Onset   • Hypertension Mother    • Arthritis Mother    • Depression Mother    • Cancer Father         bladder   • Schizophrenia Father    • Cancer Maternal Grandmother         breast   • Heart Disease Maternal Grandmother    • Psychiatric Illness Other    • Stroke Maternal Aunt    • Other Neg Hx         no connective tissue disorders or known aortic disease       SH:   Social History     Socioeconomic History   • Marital status:       Spouse name: Not on file   • Number of children: Not on file   • Years of education: Not on file   • Highest education level: Not on file   Occupational History   • 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   Social Needs   • Financial resource strain: Not on file   • Food insecurity:     Worry: Not on file     Inability: Not on file   • Transportation needs:     Medical: Not on file     Non-medical: Not on file   Tobacco Use   • Smoking status: Current Every Day Smoker     Packs/day: 0.25     Years: 3.00     Pack years: 0.75     Types: Cigarettes, Cigars   • Smokeless tobacco: Never Used   • Tobacco comment: occasional cigar   Substance and Sexual Activity   • Alcohol use: No     Alcohol/week: 0.0 oz     Comment: quit 2.5 yrs ago   • Drug use: No   • Sexual activity: Yes     Partners: Female     Comment: ;    Lifestyle   • Physical activity:     Days per week: Not on file     Minutes per session: Not on file   • Stress: Not on file   Relationships   • Social connections:     Talks on phone: Not on file     Gets together: Not on file     Attends Mandaen service: Not on file     Active member of club or organization: Not on file     Attends meetings of clubs or organizations: Not on file     Relationship status: Not on file   • Intimate partner violence:     Fear of current or ex partner: Not on file     Emotionally abused: Not on file     Physically abused: Not on file     Forced sexual activity: Not on file   Other Topics Concern   •  Service No   • Blood Transfusions Yes   • Caffeine Concern No   • Occupational Exposure No   • Hobby Hazards No   • Sleep Concern No   • Stress Concern Yes   • Weight Concern Yes   • Special Diet No   • Back Care No   • Exercise No   • Bike Helmet No   • Seat Belt Yes   • Self-Exams Yes   Social History Narrative    , lives in [place]       ROS: A 10 system review of systems was negative outside what was listed in the HPI    /74   Pulse 92   Temp 36.6 °C  "(97.8 °F) (Temporal)   Resp 18   Ht 1.803 m (5' 11\")   Wt 100.2 kg (221 lb)   SpO2 95%     Physical Exam:  General: AAOx3, NAD  HEENT: normocephalic, atraumatic  Psych: Normal mood and affect  Neck: supple, no pain to motion  Chest/Pulmonary: breathing unlabored, no audible wheezing  Cardio: regular heart rate and rhythm  Neuro: sensation grossly intact to BUE and BLE, moving all four extremities  Skin: intact with no full thickness abrasions or lacerations  MSK: Tenderness to extremes of abduction to left hip.  No tenderness to logrolling of said hip.  No swelling or erythema to the leg.  He has some mild groin tenderness to compression of iliac wings.  Denies any numbness or tingling to medial or anterior thigh.    Imaging and labs: CT scan of pelvis: There is likely a subacute left pubis fracture from his fall 1 month ago.  There are 2 fluid collections near his obturator foramen likely representing hematomas.    Assessment:   1. Closed fracture of left superior pubic ramus, initial encounter ([facility])     2. Hematoma of groin, initial encounter       Appears he has a subacute pelvic fracture.  He does have nearby hematomas that could have been from a strain of his abductor muscles.    We discussed the diagnosis and findings with the patient at length.  We reviewed possible non operative and operative interventions and the risks and benefits of these.  he had a chance to ask questions and all of these were answered to his satisfaction.        Plan:  Nonoperative treatment of pubis fracture  Weightbearing as tolerated left lower extremity  PT/OT  If drainage of the hematoma is needed to be accomplished this would need to be done through interventional radiology.  He can follow-up in 3 to 4 weeks to check on progress with mobility.    "

## 2019-09-28 NOTE — PROGRESS NOTES
Report received from NOC RN, assumed care of pt.   POC and medications reviewed with pt. Pt verbalized understanding.   AOx4  Denies SOB, or dizziness at this time.   Safety measures in place.  Hourly rounding in place.

## 2019-09-28 NOTE — H&P
"Hospital Medicine History & Physical Note    Date of Service  9/28/2019    Primary Care Physician  JOSIANE Turner.    Consultants  Orthopedic surgery: [facility]    Code Status  Full code    Chief Complaint  Left leg pain    History of Presenting Illness  44 y.o. male who presented to the emergency department on 9/27/2019 for evaluation of worsening left leg pain.  Patient had a fall about a month ago.  Patient has a problem with his right knee, on schedule for total knee replacement and ever since walking with a cane or walker.  He states he was walking at a store when he tripped over something and fell, on \"splits \".  Patient had severe pain at that time it ever since had somewhat difficulty walking.  His pain now, is mostly on the medial aspect of his inner thigh, close to the inguinal area.  He rates his pain as 10/10 in intensity with ambulation.  He has associated difficulty walking due to this pain.    He was hospitalized here recently, on 9/13/2019 for upper GI bleed.  Patient had blood transfusion and since his hemoglobin remain low, his total knee replacement, scheduled for 2 weeks ago was postponed.    He was having severe pain here in the ER, so far received 3 mg of IV Dilaudid.  CT scan of the pelvis showed \"subacute fracture of the left superior pubic ramus near the symphysis\", and additionally, patient has 2 subjacent hematomas, one measuring 8.6 cm and the other one 7.38 cm.      Review of Systems  Review of Systems   Constitutional: Negative for fever.   HENT: Negative for congestion and sore throat.    Eyes: Negative for blurred vision and double vision.   Respiratory: Negative for cough and shortness of breath.    Cardiovascular: Negative for chest pain and palpitations.   Gastrointestinal: Negative for nausea and vomiting.   Genitourinary: Negative for dysuria and urgency.   Musculoskeletal: Positive for falls, joint pain and myalgias. Negative for neck pain.   Skin: Negative for " "itching and rash.   Neurological: Negative for dizziness, weakness and headaches.   Endo/Heme/Allergies: Does not bruise/bleed easily.   Psychiatric/Behavioral: Negative for depression. The patient does not have insomnia.        Past Medical History   has a past medical history of ADD (attention deficit disorder), Alcohol abuse, Allergic rhinitis (5/21/2009), Anemia (09/2019), Anxiety (5/21/2009), Back pain (5/21/2009), Bipolar disorder ([facility]), Bleeding ulcer (5/21/2009), Depression (5/21/2009), Eczema (5/21/2009), History of bleeding ulcers (2/12/2015), Hypertension, Insomnia (5/21/2009), Migraine (5/21/2009), Obesity, morbid (HCC) (5/21/2009), Pain (09/2019), Rheumatoid arteritis (09/2019), Sleep apnea (5/21/2009), and Snoring. He also has no past medical history of Hyperlipidemia.    Surgical History   has a past surgical history that includes gastric bypass laparoscopic (2000) and cholecystectomy (2000).     Family History  family history includes Arthritis in his mother; Cancer in his father and maternal grandmother; Depression in his mother; Heart Disease in his maternal grandmother; Hypertension in his mother; Psychiatric Illness in an other family member; Schizophrenia in his father; Stroke in his maternal aunt.     Social History   reports that he has been smoking cigarettes and cigars. He has a 0.75 pack-year smoking history. He has never used smokeless tobacco. He reports that he does not drink alcohol or use drugs.    Allergies  Allergies   Allergen Reactions   • Benadryl [Altaryl]      \"I get agitated\"   • Nsaids      Bleeding, \"I had a bleeding ulcer\"   • Phenergan [Promethazine Hcl]      \"I get very agitated\"   • Penicillins      \"Had some dizziness\"       Medications  Prior to Admission Medications   Prescriptions Last Dose Informant Patient Reported? Taking?   Adalimumab (HUMIRA) 40 MG/0.4ML Prefilled Syringe Kit  Patient's Home Pharmacy Yes No   Sig: Inject 40 mg as instructed every 14 days.   Misc. " Devices Misc   No No   Sig: One shower chair Dx:Z74.09, Z91.81   Naloxone (NARCAN) 4 MG/0.1ML Liquid   No No   Sig: Spray 4 mg in nose as needed (For severe sleepiness or difficulty breathing from possible overdose. Call 911 after administration.).   acetaminophen (TYLENOL) 500 MG Tab   No No   Sig: Take 2 Tabs by mouth 3 times a day as needed (pain.  Do not exceed 3000 mg of total acetaminophen per day).   ferrous sulfate 325 (65 Fe) MG tablet  Patient's Home Pharmacy Yes No   Sig: Take 325 mg by mouth every day.   ferrous sulfate 325 (65 Fe) MG tablet   No No   Sig: Take 1 Tab by mouth every day.   furosemide (LASIX) 20 MG Tab  Patient's Home Pharmacy No No   Sig: Take 1 Tab by mouth 2 times a day.   lidocaine (LIDODERM) 5 % Patch   No No   Sig: Apply 2 Patches to skin as directed every 24 hours.   losartan (COZAAR) 25 MG Tab  Patient's Home Pharmacy No No   Sig: Take 1 Tab by mouth every day for 360 days.   omeprazole (PRILOSEC) 20 MG delayed-release capsule   No No   Sig: Take 2 Caps by mouth 2 times a day.   oxyCODONE immediate release (ROXICODONE) 10 MG immediate release tablet   No No   Sig: Take 1 Tab by mouth 3 times a day as needed for Severe Pain for up to 30 days.   potassium chloride SA (KDUR) 20 MEQ Tab CR   No No   Sig: Take 1 Tab by mouth 2 times a day.   predniSONE (DELTASONE) 10 MG Tab  Patient's Home Pharmacy Yes No   Sig: Take 5-30 mg by mouth every day. Take 30 mg daily for 5 days, 20 mg daily for 5 days, 10 mg daily for 5 days, 5 mg daily 14 days   pregabalin (LYRICA) 25 MG Cap   No No   Sig: Take 1 Cap by mouth 3 times a day for 30 days.   pregabalin (LYRICA) 25 MG Cap   No No   Sig: Take 1 Cap by mouth 3 times a day for 30 days.   sucralfate (CARAFATE) 1 GM Tab   No No   Sig: Take 1 Tab by mouth 4 Times a Day,Before Meals and at Bedtime.      Facility-Administered Medications: None       Physical Exam  Temp:  [36.6 °C (97.8 °F)] 36.6 °C (97.8 °F)  Pulse:  [72-90] 87  Resp:  [18] 18  BP:  (102-131)/(68-88) 131/78  SpO2:  [98 %-100 %] 98 %    Physical Exam   Constitutional: He is oriented to person, place, and time. He appears well-developed and well-nourished.   HENT:   Head: Normocephalic and atraumatic.   Eyes: Pupils are equal, round, and reactive to light. EOM are normal.   Neck: Normal range of motion. Neck supple.   Cardiovascular: Normal rate and regular rhythm.   Pulmonary/Chest: Effort normal and breath sounds normal.   Abdominal: Soft. Bowel sounds are normal.   Musculoskeletal: Normal range of motion. He exhibits no edema.   Neurological: He is alert and oriented to person, place, and time.   Skin: Skin is warm and dry.   Psychiatric: He has a normal mood and affect. His behavior is normal.       Laboratory:  Recent Labs     09/28/19  0155   WBC 10.7   RBC 4.10*   HEMOGLOBIN 10.8*   HEMATOCRIT 35.5*   MCV 86.6   MCH 26.3*   MCHC 30.4*   RDW 65.9*   PLATELETCT 266   MPV 8.7*     Recent Labs     09/28/19  0155   SODIUM 139   POTASSIUM 3.8   CHLORIDE 105   CO2 24   GLUCOSE 81   BUN 16   CREATININE 0.74   CALCIUM 8.1*     Recent Labs     09/28/19  0155   ALTSGPT 12   ASTSGOT 15   ALKPHOSPHAT 175*   TBILIRUBIN 0.4   GLUCOSE 81         No results for input(s): NTPROBNP in the last 72 hours.      No results for input(s): TROPONINT in the last 72 hours.    Urinalysis:    Recent Labs     09/28/19  0201   SPECGRAVITY 1.010   GLUCOSEUR Negative   KETONES Negative   NITRITE Negative   LEUKESTERAS Negative        Imaging:  CT-LSPINE W/O PLUS RECONS   Final Result      No acute fracture or traumatic malalignment.      CT-PELVIS WITH   Final Result      1.  Recent but probably subacute fracture of the left superior pubic ramus near the symphysis. Adjacent and just lateral to this are two complex masslike structures, the largest and most medial of which contains a fluid-filled cavity. These are    consistent with hematomata, possibly intramuscular.      US-EXTREMITY VENOUS LOWER UNILAT LEFT   Final Result       1.  No evidence of Left  lower extremity deep venous thrombosis.   2.  Two complex areas within the left groin, consistent with hematomata.            INTERPRETING LOCATION: [address], [place], [address]            Assessment/Plan:  I anticipate this patient is appropriate for observation status at this time.    * Pubic ramus fracture (HCC)  Assessment & Plan  -Status post mechanical fall a little over a month ago.  -Observation status.  -He is now requiring IV pain control.  -Appreciate orthopedic surgery recommendations  -PT/OT evaluation.  Appreciate recommendations.  -Patient also has 2 adjacent hematomas, likely extending to the muscle.    Iron deficiency anemia, unspecified iron deficiency anemia type- (present on admission)  Assessment & Plan  -Hemoglobin on admission is 10.8, this is better than prior hemoglobin when he had a GI bleed.  -We will continue to monitor morning labs.    Essential hypertension- (present on admission)  Assessment & Plan  -Continue losartan and Lasix    Chronic pain of right knee- (present on admission)  Assessment & Plan  -Patient is awaiting for total knee replacement.    Rheumatoid arthritis (HCC)- (present on admission)  Assessment & Plan  -Stable on Humira        VTE prophylaxis: SCD's

## 2019-09-28 NOTE — ED PROVIDER NOTES
ED Provider Note    CHIEF COMPLAINT  Chief Complaint   Patient presents with   • Groin Pain   • Low Back Pain       HPI  Richard Hubbard II is a 44 y.o. male with history of rheumatoid arthritis on Humira, recent GI bleed, hypertension, chronic back pain who presents with lower back and groin pain.  Per the patient he states that he had a fall approximately 1 month prior he tripped on a floor mat.  He has had intermittent worsening of his chronic back pain as well as left groin pain since that time.  He has been seen by his primary care physician in physiatry who have not done any testing and prescribed him oxycodone and Lyrica which he has been taking.  He states that the pain in the left groin changed and became much more severe 2 days prior.  It is to the point where he is unable to walk with a walker when he was ambulatory with a cane prior to his fall.  He describes a sharp shooting pain that is worse with movement.  It does not radiate down the leg.  There is no associated numbness or weakness to the lower extremities.  No associated bowel or bladder incontinence or saddle anesthesias.    REVIEW OF SYSTEMS  See HPI for further details.   Review of Systems   Constitutional: Negative for chills and fever.   HENT: Negative for sore throat.    Eyes: Negative for blurred vision and redness.   Respiratory: Negative for cough and shortness of breath.    Cardiovascular: Negative for chest pain and leg swelling.   Gastrointestinal: Negative for abdominal pain and vomiting.   Genitourinary: Negative for dysuria and urgency.   Musculoskeletal: Positive for back pain, falls and joint pain. Negative for neck pain.   Skin: Negative for rash.   Neurological: Negative for focal weakness, seizures and headaches.   Psychiatric/Behavioral: Negative for suicidal ideas.         PAST MEDICAL HISTORY   has a past medical history of ADD (attention deficit disorder), Alcohol abuse, Allergic rhinitis (5/21/2009), Anemia  "(09/2019), Anxiety (5/21/2009), Back pain (5/21/2009), Bipolar disorder (HCC), Bleeding ulcer (5/21/2009), Depression (5/21/2009), Eczema (5/21/2009), History of bleeding ulcers (2/12/2015), Hypertension, Insomnia (5/21/2009), Migraine (5/21/2009), Obesity, morbid (HCC) (5/21/2009), Pain (09/2019), Rheumatoid arteritis (09/2019), Sleep apnea (5/21/2009), and Snoring.    SOCIAL HISTORY  Social History     Tobacco Use   • Smoking status: Current Every Day Smoker     Packs/day: 0.25     Years: 3.00     Pack years: 0.75     Types: Cigarettes, Cigars   • Smokeless tobacco: Never Used   • Tobacco comment: occasional cigar   Substance and Sexual Activity   • Alcohol use: No     Alcohol/week: 0.0 oz     Comment: quit 2.5 yrs ago   • Drug use: No   • Sexual activity: Yes     Partners: Female     Comment: ;        SURGICAL HISTORY   has a past surgical history that includes gastric bypass laparoscopic (2000) and cholecystectomy (2000).    CURRENT MEDICATIONS  Home Medications    **Home medications have not yet been reviewed for this encounter**         ALLERGIES  Allergies   Allergen Reactions   • Benadryl [Altaryl]      \"I get agitated\"   • Nsaids      Bleeding, \"I had a bleeding ulcer\"   • Phenergan [Promethazine Hcl]      \"I get very agitated\"   • Penicillins      \"Had some dizziness\"       PHYSICAL EXAM   VITAL SIGNS: /72   Pulse 72   Temp 36.6 °C (97.8 °F) (Temporal)   Resp 18   Ht 1.803 m (5' 11\")   Wt 100.2 kg (221 lb)   SpO2 98%   BMI 30.82 kg/m²      Physical Exam   Constitutional: He is oriented to person, place, and time and well-developed, well-nourished, and in no distress. No distress.   Uncomfortable appearing obese male   HENT:   Head: Normocephalic and atraumatic.   Eyes: Pupils are equal, round, and reactive to light. Conjunctivae are normal.   Neck: Normal range of motion. Neck supple.   Cardiovascular: Normal rate, regular rhythm and normal heart sounds.   2+ DP pulses bilaterally "   Pulmonary/Chest: Effort normal and breath sounds normal. No respiratory distress.   Abdominal: Soft. He exhibits no distension. There is no tenderness.   Musculoskeletal: He exhibits edema and tenderness.   Significant tenderness to palpation over the left groin area with some underlying induration.  There is no overlying erythema or warmth.  Pain with range of motion of the hip although it is localized to the groin and not the hip joint itself.   Neurological: He is alert and oriented to person, place, and time.   Moving all extremities spontaneously.  Strength and sensation intact in the bilateral lower extremities.   Skin: Skin is warm and dry.   Psychiatric: Affect normal.         DIAGNOSTIC STUDIES      LABS  Personally reviewed by me  Labs Reviewed   CBC WITH DIFFERENTIAL - Abnormal; Notable for the following components:       Result Value    RBC 4.10 (*)     Hemoglobin 10.8 (*)     Hematocrit 35.5 (*)     MCH 26.3 (*)     MCHC 30.4 (*)     RDW 65.9 (*)     MPV 8.7 (*)     Neutrophils-Polys 81.90 (*)     Lymphocytes 10.10 (*)     Neutrophils (Absolute) 8.73 (*)     All other components within normal limits   COMP METABOLIC PANEL - Abnormal; Notable for the following components:    Calcium 8.1 (*)     Alkaline Phosphatase 175 (*)     Albumin 2.3 (*)     Total Protein 5.8 (*)     All other components within normal limits   CRP QUANTITIVE (NON-CARDIAC) - Abnormal; Notable for the following components:    Stat C-Reactive Protein 18.90 (*)     All other components within normal limits   URINALYSIS,CULTURE IF INDICATED   ESTIMATED GFR   PLATELET ESTIMATE   MORPHOLOGY   DIFFERENTIAL COMMENT           RADIOLOGY  Personally reviewed by me  US-EXTREMITY VENOUS LOWER UNILAT LEFT   Final Result      1.  No evidence of Left  lower extremity deep venous thrombosis.   2.  Two complex areas within the left groin, consistent with hematomata.            INTERPRETING LOCATION: [address] [facility], [address]      CT-PELVIS WITH     (Results Pending)           ED COURSE  Vitals:    09/27/19 2250 09/28/19 0147 09/28/19 0247 09/28/19 0317   BP: 126/88 123/79 102/68 114/72   Pulse: 90 72     Resp: 18      Temp: 36.6 °C (97.8 °F)      TempSrc: Temporal      SpO2: 100% 98%     Weight:       Height:             Medications administered:  Medications   HYDROmorphone pf (DILAUDID) injection 0.5 mg (0.5 mg Intravenous Given 9/28/19 0147)         Old records personally reviewed:  Patient with recent visit to physiatry however no imaging was performed.  He has been prescribed oxycodone and Lyrica    MEDICAL DECISION MAKING  Patient with history of HTN and recent GI bleed who presents with persistent left groin pain and back pain after a fall one month ago.  He has reassuring vitals on arrival however very uncomfortable.  No evidence of neurovascular compromise on exam.  No signs of septic arthritis or infectious process. US is negative for DVT however shows large hematoma in left groin therefore CT scan was completed showing a subacute pubic ramus fracture along with redemonstration of large hematoma.  CT lumbar spine without traumatic injury however patient may need MRI to further evaluate ongoing back pain.    I discussed the case with Dr. Olivares, orthopedic doctor on call who recommends weight bearing as tolerated and PT.      Patient will need to be admitted for PT and pain control.  I discussed the case with Dr. Hurtado, hospitalist on call who accepts admission of the patient.  Patient and family were updated on plan of care and agreeable.        IMPRESSION  1. Closed fracture of left superior pubic ramus, initial encounter ([facility])    2. Hematoma of groin, initial encounter        Disposition: Admit medicine, stable condition  Results, diagnoses, and treatment options were discussed with the patient and/or family. Patient verbalized understanding of plan of care.    Patient referred to primary care provider for monitoring and treatment of blood  pressure.      New Prescriptions    No medications on file           Electronically signed by: Angelita Alvarado, 9/28/2019 1:52 AM

## 2019-09-28 NOTE — ED NOTES
Presents to ED c/o worsening lower back pain and L groin pain s/p fall 1 month ago. Reports has been seen multiple times regarding complaint w/ no improvement. Denies recent falls/trauma.

## 2019-09-28 NOTE — DISCHARGE PLANNING
Care Transition Team Assessment    Patient lives at home with his spouse and current discharge plan is for this patient to return home when medically able. No current SS needs noted.  SW will continue to monitor and assist as needed.     Information Source  Orientation : Oriented x 4  Information Given By: Patient  Informant's Name:   Who is responsible for making decisions for patient? : Patient    Elopement Risk  Legal Hold: No  Ambulatory or Self Mobile in Wheelchair: No-Not an Elopement Risk  Elopement Risk: Not at Risk for Elopement    Interdisciplinary Discharge Planning  Lives with - Patient's Self Care Capacity: Spouse  Patient or legal guardian wants to designate a caregiver (see row info): No    Discharge Preparedness  What is your plan after discharge?: Home with help  What are your discharge supports?: Spouse  Prior Functional Level: Independent with Activities of Daily Living    Functional Assesment  Prior Functional Level: Independent with Activities of Daily Living    Finances  Financial Barriers to Discharge: No  Prescription Coverage: Yes     Advance Directive  Advance Directive?: None    Domestic Abuse  Have you ever been the victim of abuse or violence?: No  Physical Abuse or Sexual Abuse: No  Verbal Abuse or Emotional Abuse: No  Possible Abuse Reported to:: Not Applicable    Psychological Assessment  History of Substance Abuse: None    Discharge Risks or Barriers  Discharge risks or barriers?: No    Anticipated Discharge Information  Anticipated discharge disposition: Home

## 2019-09-28 NOTE — ED TRIAGE NOTES
"Patient BiB wife for groin and back pain. He had a fall 1 month ago and injured his groin but it has just steadily gotten worse. Patient has seen multiple people but states \"They just thrown medicine at it but they havent done a work up yet.\"  "

## 2019-09-28 NOTE — PROGRESS NOTES
No acute issues overnight, patient still having lower back pain and left leg discomfort especially when he moves, describes it as a 7 out of 10 in severity.    At this present time patient will undergo physical and occupational therapy evaluation.  I have started the patient on MS Contin 50 mg twice daily on top of his oral oxycodone

## 2019-09-28 NOTE — ASSESSMENT & PLAN NOTE
Ms contin added this hospital stay, will taper dose  Pain is worse with rheumatoid arthritis flair currently, I discussed this with rheumatology Dr. Conway  who recommends starting prednisone at 40mg daily with a 10mg taper every 3 days down to 5mg daily for 3 days then stop.   Discussed with ID again today and ok to start prednisone therapy

## 2019-09-29 LAB
ANION GAP SERPL CALC-SCNC: 8 MMOL/L (ref 0–11.9)
BASOPHILS # BLD AUTO: 0.4 % (ref 0–1.8)
BASOPHILS # BLD: 0.04 K/UL (ref 0–0.12)
BUN SERPL-MCNC: 11 MG/DL (ref 8–22)
CALCIUM SERPL-MCNC: 7.8 MG/DL (ref 8.4–10.2)
CHLORIDE SERPL-SCNC: 101 MMOL/L (ref 96–112)
CO2 SERPL-SCNC: 27 MMOL/L (ref 20–33)
CREAT SERPL-MCNC: 0.69 MG/DL (ref 0.5–1.4)
EOSINOPHIL # BLD AUTO: 0.02 K/UL (ref 0–0.51)
EOSINOPHIL NFR BLD: 0.2 % (ref 0–6.9)
ERYTHROCYTE [DISTWIDTH] IN BLOOD BY AUTOMATED COUNT: 64.1 FL (ref 35.9–50)
GLUCOSE SERPL-MCNC: 87 MG/DL (ref 65–99)
HCT VFR BLD AUTO: 30.3 % (ref 42–52)
HGB BLD-MCNC: 9.5 G/DL (ref 14–18)
IMM GRANULOCYTES # BLD AUTO: 0.13 K/UL (ref 0–0.11)
IMM GRANULOCYTES NFR BLD AUTO: 1.2 % (ref 0–0.9)
LYMPHOCYTES # BLD AUTO: 1.73 K/UL (ref 1–4.8)
LYMPHOCYTES NFR BLD: 16.6 % (ref 22–41)
MCH RBC QN AUTO: 26.5 PG (ref 27–33)
MCHC RBC AUTO-ENTMCNC: 31.4 G/DL (ref 33.7–35.3)
MCV RBC AUTO: 84.4 FL (ref 81.4–97.8)
MONOCYTES # BLD AUTO: 0.96 K/UL (ref 0–0.85)
MONOCYTES NFR BLD AUTO: 9.2 % (ref 0–13.4)
NEUTROPHILS # BLD AUTO: 7.55 K/UL (ref 1.82–7.42)
NEUTROPHILS NFR BLD: 72.4 % (ref 44–72)
NRBC # BLD AUTO: 0 K/UL
NRBC BLD-RTO: 0 /100 WBC
PLATELET # BLD AUTO: 260 K/UL (ref 164–446)
PMV BLD AUTO: 9.1 FL (ref 9–12.9)
POTASSIUM SERPL-SCNC: 3.8 MMOL/L (ref 3.6–5.5)
RBC # BLD AUTO: 3.59 M/UL (ref 4.7–6.1)
SODIUM SERPL-SCNC: 136 MMOL/L (ref 135–145)
WBC # BLD AUTO: 10.4 K/UL (ref 4.8–10.8)

## 2019-09-29 PROCEDURE — G0378 HOSPITAL OBSERVATION PER HR: HCPCS

## 2019-09-29 PROCEDURE — 700102 HCHG RX REV CODE 250 W/ 637 OVERRIDE(OP): Performed by: HOSPITALIST

## 2019-09-29 PROCEDURE — A9270 NON-COVERED ITEM OR SERVICE: HCPCS | Performed by: HOSPITALIST

## 2019-09-29 PROCEDURE — 36415 COLL VENOUS BLD VENIPUNCTURE: CPT

## 2019-09-29 PROCEDURE — 700111 HCHG RX REV CODE 636 W/ 250 OVERRIDE (IP): Performed by: HOSPITALIST

## 2019-09-29 PROCEDURE — 97162 PT EVAL MOD COMPLEX 30 MIN: CPT

## 2019-09-29 PROCEDURE — 96366 THER/PROPH/DIAG IV INF ADDON: CPT

## 2019-09-29 PROCEDURE — 85025 COMPLETE CBC W/AUTO DIFF WBC: CPT

## 2019-09-29 PROCEDURE — 99225 PR SUBSEQUENT OBSERVATION CARE,LEVEL II: CPT | Performed by: HOSPITALIST

## 2019-09-29 PROCEDURE — 80048 BASIC METABOLIC PNL TOTAL CA: CPT

## 2019-09-29 PROCEDURE — 96376 TX/PRO/DX INJ SAME DRUG ADON: CPT

## 2019-09-29 RX ADMIN — HYDROMORPHONE HYDROCHLORIDE 0.5 MG: 1 INJECTION, SOLUTION INTRAMUSCULAR; INTRAVENOUS; SUBCUTANEOUS at 09:03

## 2019-09-29 RX ADMIN — SENNOSIDES, DOCUSATE SODIUM 2 TABLET: 50; 8.6 TABLET, FILM COATED ORAL at 05:03

## 2019-09-29 RX ADMIN — PREGABALIN 25 MG: 25 CAPSULE ORAL at 11:27

## 2019-09-29 RX ADMIN — PREGABALIN 25 MG: 25 CAPSULE ORAL at 05:04

## 2019-09-29 RX ADMIN — FUROSEMIDE 20 MG: 40 TABLET ORAL at 16:58

## 2019-09-29 RX ADMIN — MORPHINE SULFATE 15 MG: 15 TABLET, EXTENDED RELEASE ORAL at 16:58

## 2019-09-29 RX ADMIN — HYDROMORPHONE HYDROCHLORIDE 0.5 MG: 1 INJECTION, SOLUTION INTRAMUSCULAR; INTRAVENOUS; SUBCUTANEOUS at 14:40

## 2019-09-29 RX ADMIN — HYDROMORPHONE HYDROCHLORIDE 0.5 MG: 1 INJECTION, SOLUTION INTRAMUSCULAR; INTRAVENOUS; SUBCUTANEOUS at 20:56

## 2019-09-29 RX ADMIN — LOSARTAN POTASSIUM 25 MG: 25 TABLET, FILM COATED ORAL at 05:04

## 2019-09-29 RX ADMIN — MORPHINE SULFATE 15 MG: 15 TABLET, EXTENDED RELEASE ORAL at 05:04

## 2019-09-29 RX ADMIN — OMEPRAZOLE 40 MG: 20 CAPSULE, DELAYED RELEASE ORAL at 05:03

## 2019-09-29 RX ADMIN — FERROUS SULFATE TAB 325 MG (65 MG ELEMENTAL FE) 325 MG: 325 (65 FE) TAB at 05:04

## 2019-09-29 RX ADMIN — SENNOSIDES, DOCUSATE SODIUM 2 TABLET: 50; 8.6 TABLET, FILM COATED ORAL at 16:59

## 2019-09-29 RX ADMIN — NICOTINE 14 MG: 14 PATCH, EXTENDED RELEASE TRANSDERMAL at 05:04

## 2019-09-29 RX ADMIN — MORPHINE SULFATE 4 MG: 4 INJECTION INTRAVENOUS at 09:56

## 2019-09-29 RX ADMIN — FUROSEMIDE 20 MG: 40 TABLET ORAL at 05:04

## 2019-09-29 RX ADMIN — OMEPRAZOLE 40 MG: 20 CAPSULE, DELAYED RELEASE ORAL at 16:58

## 2019-09-29 RX ADMIN — PREGABALIN 25 MG: 25 CAPSULE ORAL at 16:58

## 2019-09-29 RX ADMIN — HYDROMORPHONE HYDROCHLORIDE 0.5 MG: 1 INJECTION, SOLUTION INTRAMUSCULAR; INTRAVENOUS; SUBCUTANEOUS at 03:23

## 2019-09-29 ASSESSMENT — ENCOUNTER SYMPTOMS
TINGLING: 0
SENSORY CHANGE: 0
SHORTNESS OF BREATH: 0
NERVOUS/ANXIOUS: 0
NECK PAIN: 0
DEPRESSION: 0
NAUSEA: 0
CONSTIPATION: 0
CHILLS: 0
STRIDOR: 0
HEARTBURN: 0
ORTHOPNEA: 0
BLOOD IN STOOL: 0
CLAUDICATION: 0
DOUBLE VISION: 0
MYALGIAS: 1
PND: 0
FALLS: 1
MEMORY LOSS: 0
EYE PAIN: 0
SORE THROAT: 0
SPUTUM PRODUCTION: 0
TREMORS: 0
HEMOPTYSIS: 0
BLURRED VISION: 0
DIZZINESS: 0
PALPITATIONS: 0
VOMITING: 0
COUGH: 0
SPEECH CHANGE: 0
FEVER: 0
BACK PAIN: 1
WEAKNESS: 0
PHOTOPHOBIA: 0
HEADACHES: 0

## 2019-09-29 ASSESSMENT — COGNITIVE AND FUNCTIONAL STATUS - GENERAL
WALKING IN HOSPITAL ROOM: A LITTLE
TURNING FROM BACK TO SIDE WHILE IN FLAT BAD: A LITTLE
MOBILITY SCORE: 16
SUGGESTED CMS G CODE MODIFIER MOBILITY: CK
MOVING FROM LYING ON BACK TO SITTING ON SIDE OF FLAT BED: A LITTLE
MOVING TO AND FROM BED TO CHAIR: A LITTLE
CLIMB 3 TO 5 STEPS WITH RAILING: TOTAL
STANDING UP FROM CHAIR USING ARMS: A LITTLE

## 2019-09-29 ASSESSMENT — GAIT ASSESSMENTS
ASSISTIVE DEVICE: FRONT WHEEL WALKER
GAIT LEVEL OF ASSIST: SUPERVISED
DISTANCE (FEET): 20
DEVIATION: ANTALGIC;BRADYKINETIC

## 2019-09-29 ASSESSMENT — PATIENT HEALTH QUESTIONNAIRE - PHQ9
1. LITTLE INTEREST OR PLEASURE IN DOING THINGS: NOT AT ALL
2. FEELING DOWN, DEPRESSED, IRRITABLE, OR HOPELESS: NOT AT ALL
SUM OF ALL RESPONSES TO PHQ9 QUESTIONS 1 AND 2: 0

## 2019-09-29 NOTE — PROGRESS NOTES
Report received from NOC RN, assumed care of pt.   POC and medications reviewed with pt. Pt verbalized understanding.   AOx4  C/o pain in leg at this time. Will medicate per MAR when patient is ready.   Denies SOB, or dizziness at this time.   Safety measures in place.  Hourly rounding in place.

## 2019-09-29 NOTE — CARE PLAN
Problem: Safety  Goal: Will remain free from falls  Outcome: PROGRESSING AS EXPECTED  Intervention: Assess risk factors for falls  Flowsheets  Taken 9/29/2019 0900  Pt Calls for Assistance: Yes  Taken 9/29/2019 0925  History of fall: 2  Mobility Status Assessment: 1-1 Healthcare Provider Required for Assistance with Ambulation & Transfer  Note:   Pt to call before getting out of bed. Pt verbalized understanding.     Problem: Pain Management  Goal: Pain level will decrease to patient's comfort goal  Outcome: PROGRESSING AS EXPECTED  Intervention: Follow pain managment plan developed in collaboration with patient and Interdisciplinary Team  Note:   IV dilaudid as needed for pain per MAR. IV morphine available when PT/OT works with pt.

## 2019-09-29 NOTE — THERAPY
"Physical Therapy Evaluation completed.   Bed Mobility:  Supine to Sit: Minimal Assist  Transfers: Sit to Stand: Minimal Assist  Gait: Level Of Assist: Supervised with Front-Wheel Walker   X 20 feet    Plan of Care: Will benefit from Physical Therapy 7 times per week  Discharge Recommendations: Equipment: No Equipment Needed. Post-acute therapy Discharge to home with outpatient or home health for additional skilled therapy services.  44 Year old male admitted with L pubic Fx,Pt has a history of RA  and needs a R TKR.He lives at home with wife and has been ambulating with a fww x 1 month since fall.Acute PT needed to improve bed mob,transfers and ambulation   See \"Rehab Therapy-Acute\" Patient Summary Report for complete documentation.     "

## 2019-09-29 NOTE — PROGRESS NOTES
[facility] Medicine Daily Progress Note    Date of Service  9/29/2019    Chief Complaint  44 y.o. male admitted 9/27/2019 with hip pain.    Hospital Course    per HPI      Interval Problem Update  Overall patient says his hip and back pain have improved, still 5-7/10 depending on movement. Says MS mclaughlin is doing a better job controlling his pain. He is eager to get better.    Consultants/Specialty  None    Code Status  Full Code    Disposition  TBD    Review of Systems  Review of Systems   Constitutional: Positive for malaise/fatigue. Negative for chills and fever.   HENT: Negative for congestion, hearing loss, sore throat and tinnitus.    Eyes: Negative for blurred vision, double vision, photophobia and pain.   Respiratory: Negative for cough, hemoptysis, sputum production, shortness of breath and stridor.    Cardiovascular: Negative for chest pain, palpitations, orthopnea, claudication and PND.   Gastrointestinal: Negative for blood in stool, constipation, heartburn, melena, nausea and vomiting.   Genitourinary: Negative for dysuria, frequency and urgency.   Musculoskeletal: Positive for back pain, falls and myalgias. Negative for neck pain.   Neurological: Negative for dizziness, tingling, tremors, sensory change, speech change, weakness and headaches.   Psychiatric/Behavioral: Negative for depression, memory loss and suicidal ideas. The patient is not nervous/anxious.    All other systems reviewed and are negative.       Physical Exam  Temp:  [36.9 °C (98.5 °F)-37.4 °C (99.3 °F)] 37 °C (98.6 °F)  Pulse:  [73-94] 94  Resp:  [16-18] 18  BP: (103-120)/(67-82) 103/67  SpO2:  [97 %-100 %] 97 %    Physical Exam   Constitutional: He is oriented to person, place, and time. He appears well-developed and well-nourished. No distress.   HENT:   Head: Normocephalic and atraumatic.   Mouth/Throat: No oropharyngeal exudate.   Eyes: Pupils are equal, round, and reactive to light. Conjunctivae are normal. Right eye exhibits no  discharge. No scleral icterus.   Neck: Neck supple. No JVD present. No thyromegaly present.   Cardiovascular: Normal rate and intact distal pulses.   No murmur heard.  Pulses:       Dorsalis pedis pulses are 2+ on the right side, and 2+ on the left side.   Cap refill < 3 s   Pulmonary/Chest: Effort normal and breath sounds normal. No stridor. No respiratory distress. He has no wheezes. He has no rales.   Abdominal: Soft. Bowel sounds are normal. He exhibits no distension. There is no tenderness. There is no rebound.   Musculoskeletal: Normal range of motion. He exhibits edema.   Mild left thigh with ecchymosis small area.   Neurological: He is alert and oriented to person, place, and time. No cranial nerve deficit.   Skin: Skin is warm and dry. He is not diaphoretic. No erythema.   Psychiatric: He has a normal mood and affect. His behavior is normal. Thought content normal.   Nursing note and vitals reviewed.      Fluids    Intake/Output Summary (Last 24 hours) at 9/29/2019 1128  Last data filed at 9/29/2019 0830  Gross per 24 hour   Intake --   Output 4425 ml   Net -4425 ml       Laboratory  Recent Labs     09/28/19  0155 09/29/19  0314   WBC 10.7 10.4   RBC 4.10* 3.59*   HEMOGLOBIN 10.8* 9.5*   HEMATOCRIT 35.5* 30.3*   MCV 86.6 84.4   MCH 26.3* 26.5*   MCHC 30.4* 31.4*   RDW 65.9* 64.1*   PLATELETCT 266 260   MPV 8.7* 9.1     Recent Labs     09/28/19  0155 09/28/19  0846 09/29/19  0314   SODIUM 139 136 136   POTASSIUM 3.8 3.6 3.8   CHLORIDE 105 104 101   CO2 24 23 27   GLUCOSE 81 104* 87   BUN 16 14 11   CREATININE 0.74 0.67 0.69   CALCIUM 8.1* 7.9* 7.8*                   Imaging  MR-THORACIC SPINE-W/O   Final Result      1.  No acute fracture in the thoracic spine.   2.  Mild chronic compression deformity at T11.      MR-LUMBAR SPINE-W/O   Final Result      1.  Acute bilateral sacral alae fractures.   2.  There is edema in the lower posterior paraspinal soft tissue likely representing soft tissue contusion.   3.   There is no lumbar spine fractures.         CT-LSPINE W/O PLUS RECONS   Final Result      No acute fracture or traumatic malalignment.      CT-PELVIS WITH   Final Result      1.  Recent but probably subacute fracture of the left superior pubic ramus near the symphysis. Adjacent and just lateral to this are two complex masslike structures, the largest and most medial of which contains a fluid-filled cavity. These are    consistent with hematomata, possibly intramuscular.      US-EXTREMITY VENOUS LOWER UNILAT LEFT   Final Result      1.  No evidence of Left  lower extremity deep venous thrombosis.   2.  Two complex areas within the left groin, consistent with hematomata.            INTERPRETING LOCATION: [address], [place], [address]           Assessment/Plan  * Pubic ramus fracture (HCC)  Assessment & Plan  S/p mechanical GLF  PT working with patient recommending Acute PT  MS Continue 15mg BID continue with short acting for breakthrough.      Iron deficiency anemia, unspecified iron deficiency anemia type- (present on admission)  Assessment & Plan  H/H overall improved compared to prior admission.  Resume iron supplemetation      Essential hypertension- (present on admission)  Assessment & Plan  Controlled  Resume home dose of Coreg, Losartan and lasix.     Chronic pain of right knee- (present on admission)  Assessment & Plan  As above, MS contin + short acting oxycodone for breakthrough, patient is already on Lyrica.    Rheumatoid arthritis (HCC)- (present on admission)  Assessment & Plan  Continue Humira as outpatient       Patient plan of care discussed at multidisplinary team rounds and with patient and R.N at beside.    VTE prophylaxis: SCDs

## 2019-09-30 LAB
ANION GAP SERPL CALC-SCNC: 9 MMOL/L (ref 0–11.9)
BASOPHILS # BLD AUTO: 0.6 % (ref 0–1.8)
BASOPHILS # BLD: 0.07 K/UL (ref 0–0.12)
BUN SERPL-MCNC: 11 MG/DL (ref 8–22)
CALCIUM SERPL-MCNC: 8.1 MG/DL (ref 8.4–10.2)
CHLORIDE SERPL-SCNC: 102 MMOL/L (ref 96–112)
CO2 SERPL-SCNC: 26 MMOL/L (ref 20–33)
CREAT SERPL-MCNC: 0.63 MG/DL (ref 0.5–1.4)
EOSINOPHIL # BLD AUTO: 0.03 K/UL (ref 0–0.51)
EOSINOPHIL NFR BLD: 0.3 % (ref 0–6.9)
ERYTHROCYTE [DISTWIDTH] IN BLOOD BY AUTOMATED COUNT: 64.7 FL (ref 35.9–50)
GLUCOSE SERPL-MCNC: 86 MG/DL (ref 65–99)
HCT VFR BLD AUTO: 32.7 % (ref 42–52)
HGB BLD-MCNC: 9.9 G/DL (ref 14–18)
IMM GRANULOCYTES # BLD AUTO: 0.29 K/UL (ref 0–0.11)
IMM GRANULOCYTES NFR BLD AUTO: 2.6 % (ref 0–0.9)
LYMPHOCYTES # BLD AUTO: 1.78 K/UL (ref 1–4.8)
LYMPHOCYTES NFR BLD: 16 % (ref 22–41)
MCH RBC QN AUTO: 26.1 PG (ref 27–33)
MCHC RBC AUTO-ENTMCNC: 30.3 G/DL (ref 33.7–35.3)
MCV RBC AUTO: 86.1 FL (ref 81.4–97.8)
MONOCYTES # BLD AUTO: 0.98 K/UL (ref 0–0.85)
MONOCYTES NFR BLD AUTO: 8.8 % (ref 0–13.4)
NEUTROPHILS # BLD AUTO: 7.98 K/UL (ref 1.82–7.42)
NEUTROPHILS NFR BLD: 71.7 % (ref 44–72)
NRBC # BLD AUTO: 0 K/UL
NRBC BLD-RTO: 0 /100 WBC
PLATELET # BLD AUTO: 288 K/UL (ref 164–446)
PMV BLD AUTO: 9.4 FL (ref 9–12.9)
POTASSIUM SERPL-SCNC: 4.1 MMOL/L (ref 3.6–5.5)
RBC # BLD AUTO: 3.8 M/UL (ref 4.7–6.1)
SODIUM SERPL-SCNC: 137 MMOL/L (ref 135–145)
WBC # BLD AUTO: 11.1 K/UL (ref 4.8–10.8)

## 2019-09-30 PROCEDURE — G0378 HOSPITAL OBSERVATION PER HR: HCPCS

## 2019-09-30 PROCEDURE — 97116 GAIT TRAINING THERAPY: CPT

## 2019-09-30 PROCEDURE — 97165 OT EVAL LOW COMPLEX 30 MIN: CPT

## 2019-09-30 PROCEDURE — A9270 NON-COVERED ITEM OR SERVICE: HCPCS | Performed by: HOSPITALIST

## 2019-09-30 PROCEDURE — 700102 HCHG RX REV CODE 250 W/ 637 OVERRIDE(OP): Performed by: HOSPITALIST

## 2019-09-30 PROCEDURE — 700111 HCHG RX REV CODE 636 W/ 250 OVERRIDE (IP): Performed by: HOSPITALIST

## 2019-09-30 PROCEDURE — 99225 PR SUBSEQUENT OBSERVATION CARE,LEVEL II: CPT | Performed by: HOSPITALIST

## 2019-09-30 PROCEDURE — 80048 BASIC METABOLIC PNL TOTAL CA: CPT

## 2019-09-30 PROCEDURE — 97530 THERAPEUTIC ACTIVITIES: CPT

## 2019-09-30 PROCEDURE — 97535 SELF CARE MNGMENT TRAINING: CPT

## 2019-09-30 PROCEDURE — 36415 COLL VENOUS BLD VENIPUNCTURE: CPT

## 2019-09-30 PROCEDURE — 85025 COMPLETE CBC W/AUTO DIFF WBC: CPT

## 2019-09-30 PROCEDURE — 96376 TX/PRO/DX INJ SAME DRUG ADON: CPT

## 2019-09-30 RX ORDER — OXYCODONE HYDROCHLORIDE 5 MG/1
5 TABLET ORAL EVERY 4 HOURS PRN
Status: DISCONTINUED | OUTPATIENT
Start: 2019-09-30 | End: 2019-10-07

## 2019-09-30 RX ORDER — MORPHINE SULFATE 15 MG/1
15 TABLET, FILM COATED, EXTENDED RELEASE ORAL ONCE
Status: COMPLETED | OUTPATIENT
Start: 2019-09-30 | End: 2019-09-30

## 2019-09-30 RX ORDER — MORPHINE SULFATE 30 MG/1
30 TABLET, FILM COATED, EXTENDED RELEASE ORAL EVERY 12 HOURS
Status: DISCONTINUED | OUTPATIENT
Start: 2019-10-01 | End: 2019-10-13

## 2019-09-30 RX ADMIN — OXYCODONE HYDROCHLORIDE 5 MG: 5 TABLET ORAL at 23:10

## 2019-09-30 RX ADMIN — OXYCODONE HYDROCHLORIDE 5 MG: 5 TABLET ORAL at 10:50

## 2019-09-30 RX ADMIN — LOSARTAN POTASSIUM 25 MG: 25 TABLET, FILM COATED ORAL at 05:23

## 2019-09-30 RX ADMIN — SENNOSIDES, DOCUSATE SODIUM 2 TABLET: 50; 8.6 TABLET, FILM COATED ORAL at 05:22

## 2019-09-30 RX ADMIN — PREGABALIN 25 MG: 25 CAPSULE ORAL at 10:04

## 2019-09-30 RX ADMIN — OMEPRAZOLE 40 MG: 20 CAPSULE, DELAYED RELEASE ORAL at 18:12

## 2019-09-30 RX ADMIN — NICOTINE 14 MG: 14 PATCH, EXTENDED RELEASE TRANSDERMAL at 05:22

## 2019-09-30 RX ADMIN — MORPHINE SULFATE 15 MG: 15 TABLET, EXTENDED RELEASE ORAL at 05:23

## 2019-09-30 RX ADMIN — PREGABALIN 25 MG: 25 CAPSULE ORAL at 18:12

## 2019-09-30 RX ADMIN — OXYCODONE HYDROCHLORIDE 5 MG: 5 TABLET ORAL at 14:45

## 2019-09-30 RX ADMIN — MORPHINE SULFATE 15 MG: 15 TABLET, EXTENDED RELEASE ORAL at 19:23

## 2019-09-30 RX ADMIN — FUROSEMIDE 20 MG: 40 TABLET ORAL at 14:45

## 2019-09-30 RX ADMIN — OMEPRAZOLE 40 MG: 20 CAPSULE, DELAYED RELEASE ORAL at 05:22

## 2019-09-30 RX ADMIN — HYDROMORPHONE HYDROCHLORIDE 0.5 MG: 1 INJECTION, SOLUTION INTRAMUSCULAR; INTRAVENOUS; SUBCUTANEOUS at 02:58

## 2019-09-30 RX ADMIN — FERROUS SULFATE TAB 325 MG (65 MG ELEMENTAL FE) 325 MG: 325 (65 FE) TAB at 05:23

## 2019-09-30 RX ADMIN — MORPHINE SULFATE 15 MG: 15 TABLET, EXTENDED RELEASE ORAL at 18:12

## 2019-09-30 RX ADMIN — SENNOSIDES, DOCUSATE SODIUM 2 TABLET: 50; 8.6 TABLET, FILM COATED ORAL at 18:12

## 2019-09-30 RX ADMIN — PREGABALIN 25 MG: 25 CAPSULE ORAL at 12:38

## 2019-09-30 ASSESSMENT — ENCOUNTER SYMPTOMS
HEARTBURN: 0
CONSTIPATION: 0
DEPRESSION: 0
CLAUDICATION: 0
BLOOD IN STOOL: 0
FEVER: 0
PHOTOPHOBIA: 0
TREMORS: 0
BACK PAIN: 1
STRIDOR: 0
NECK PAIN: 0
VOMITING: 0
DOUBLE VISION: 0
WEAKNESS: 0
CHILLS: 0
MYALGIAS: 1
ORTHOPNEA: 0
MEMORY LOSS: 0
SPUTUM PRODUCTION: 0
FALLS: 1
NERVOUS/ANXIOUS: 0
DIZZINESS: 0
SHORTNESS OF BREATH: 0
PALPITATIONS: 0
HEMOPTYSIS: 0
PND: 0
TINGLING: 0
SPEECH CHANGE: 0
EYE PAIN: 0
NAUSEA: 0
HEADACHES: 0
COUGH: 0
BLURRED VISION: 0
SORE THROAT: 0
SENSORY CHANGE: 0

## 2019-09-30 ASSESSMENT — GAIT ASSESSMENTS
DEVIATION: ANTALGIC;STEP TO
DISTANCE (FEET): 10
GAIT LEVEL OF ASSIST: MINIMAL ASSIST
ASSISTIVE DEVICE: FRONT WHEEL WALKER

## 2019-09-30 ASSESSMENT — COGNITIVE AND FUNCTIONAL STATUS - GENERAL
HELP NEEDED FOR BATHING: A LOT
DRESSING REGULAR LOWER BODY CLOTHING: A LOT
SUGGESTED CMS G CODE MODIFIER DAILY ACTIVITY: CK
TOILETING: A LOT
PERSONAL GROOMING: A LITTLE
EATING MEALS: A LITTLE
DRESSING REGULAR UPPER BODY CLOTHING: A LITTLE
DAILY ACTIVITIY SCORE: 15

## 2019-09-30 ASSESSMENT — ACTIVITIES OF DAILY LIVING (ADL): TOILETING: INDEPENDENT

## 2019-09-30 NOTE — THERAPY
"Occupational Therapy Evaluation completed.   Functional Status:  45 yo male admit with inability to walk, found to have pelvic fx, non-operable, WBAT.  Hemaomas in groin also causing considerable pain when pt trying to move LLE.  Pt has bone on bone OA on R knee and due to have TKA but had to postpone surgery due to recent GI bleed and continuing anemia.  Pt was limited in mobility prior to admit due to pain, is considerably more painful now.  Pt required Adore for supine to sit with HOB elevated.  Adore (for close CGA) sit to stand, Adore LB dressing using reacher, LH shoehorn and sockaide.  Adore to pivot from EOB to bedside chair, requiring extra time.  Pt not able to fully clear LLE from the ground when pivoting to chair.  Currently pt is not efficient or functional with mobility.  He reports it took 20 min to get to bathroom yesterday.  Pt will need a 3 in 1 commode for home, and a wheelchair for access into the home and out into the community as managing steps into home does not appear realistic for pt at this time.  Py may also need a tub transfer bench for bathing if 3-1 commode will not fit in his shower.  Pt will also benefit from Home Health therapy followup at home to help progress his tolerance for and safety with mobility. Pt advised to purchase a Hip Kit for LB dressing and self care, and given information on [employer] for whatever large DME that insurance will not cover. OT will follow while in house.    Plan of Care: Will benefit from Occupational Therapy 3 times per week  Discharge Recommendations:  Equipment: Wheelchair, Bedside Commode and Will Continue to Assess for Equipment Needs. Recommend Discharge to home with home health for additional skilled therapy services.    See \"Rehab Therapy-Acute\" Patient Summary Report for complete documentation.    "

## 2019-09-30 NOTE — THERAPY
"Physical Therapy treatment completed.   Bed Mobility:  Supine to Sit: (NT, pt sitting up in chair)  Transfers: Sit to Stand: Minimal Assist  Gait: Level Of Assist: Minimal Assist with Front-Wheel Walker x 10ft      Plan of Care: Will benefit from Physical Therapy 7 times per week  Discharge Recommendations: Equipment: Wheelchair and Bedside Commode. Post-acute therapy Discharge to home with home health for additional skilled therapy services.    Pt with increased pain today R groin, appears to be muscular in nature and related to hematomas, pt only able to ambulate 10 ft with FWW. Pt will need better pain control in order to DC home safely so can ambualte household distances and manage 2 step entry into home.    See \"Rehab Therapy-Acute\" Patient Summary Report for complete documentation.     "

## 2019-09-30 NOTE — CARE PLAN
Problem: Safety  Goal: Will remain free from injury  Outcome: PROGRESSING AS EXPECTED     Problem: Pain Management  Goal: Pain level will decrease to patient's comfort goal  Outcome: PROGRESSING AS EXPECTED     Pt is using IV dilaudid, discussion regarding wanted to go home and not able to use IV pain medication. Has MS contin on q 12 hours. Will attempt to see if that will cover his pain.     Calls when he needs assistance.

## 2019-09-30 NOTE — PROGRESS NOTES
Report form FAITH Lovell NOC. PT awake and alert, no needs at this time.     Pt using walker to get up and ambulate, currently in bed.

## 2019-09-30 NOTE — PROGRESS NOTES
Hospital Medicine Daily Progress Note    Date of Service  9/30/2019    Chief Complaint  44 y.o. male admitted 9/27/2019 with hip pain.    Hospital Course    per HPI      Interval Problem Update  Overall patient says his hip and back pain have improved, still 5-7/10 depending on movement. Says MS mclaughlin is doing a better job controlling his pain. He is eager to get better.    9/30  Patient says that his overall pain is better controlled, although still having intermittent pain between his MS Contin, also says he has thigh pain from hematoma.  Patient is worried and wants to work with physical therapy today, make sure that his oral opioids are well controlling his symptoms and not having to rely on IV.     Consultants/Specialty  None    Code Status  Full Code    Disposition  TBD    Review of Systems  Review of Systems   Constitutional: Negative for chills, fever and malaise/fatigue.   HENT: Negative for congestion, hearing loss, sore throat and tinnitus.    Eyes: Negative for blurred vision, double vision, photophobia and pain.   Respiratory: Negative for cough, hemoptysis, sputum production, shortness of breath and stridor.    Cardiovascular: Negative for chest pain, palpitations, orthopnea, claudication and PND.   Gastrointestinal: Negative for blood in stool, constipation, heartburn, melena, nausea and vomiting.   Genitourinary: Negative for dysuria, frequency and urgency.   Musculoskeletal: Positive for back pain (improved), falls and myalgias (improved ). Negative for neck pain.   Neurological: Negative for dizziness, tingling, tremors, sensory change, speech change, weakness and headaches.   Psychiatric/Behavioral: Negative for depression, memory loss and suicidal ideas. The patient is not nervous/anxious.    All other systems reviewed and are negative.       Physical Exam  Temp:  [36.8 °C (98.2 °F)-37.5 °C (99.5 °F)] 36.8 °C (98.2 °F)  Pulse:  [80-98] 98  Resp:  [14-18] 16  BP: (104-118)/(58-82) 111/58  SpO2:   [94 %-100 %] 98 %    Physical Exam   Constitutional: He is oriented to person, place, and time. He appears well-developed and well-nourished. No distress.   HENT:   Head: Normocephalic and atraumatic.   Mouth/Throat: No oropharyngeal exudate.   Eyes: Pupils are equal, round, and reactive to light. Conjunctivae are normal. Right eye exhibits no discharge. No scleral icterus.   Neck: Neck supple. No JVD present. No thyromegaly present.   Cardiovascular: Normal rate and intact distal pulses.   No murmur heard.  Pulses:       Dorsalis pedis pulses are 2+ on the right side, and 2+ on the left side.   Cap refill < 3 s   Pulmonary/Chest: Effort normal and breath sounds normal. No stridor. No respiratory distress. He has no wheezes. He has no rales.   Abdominal: Soft. Bowel sounds are normal. He exhibits no distension. There is no tenderness. There is no rebound.   Musculoskeletal: Normal range of motion. He exhibits edema.   Mild left thigh with ecchymosis improving.    Neurological: He is alert and oriented to person, place, and time. No cranial nerve deficit.   Skin: Skin is warm and dry. He is not diaphoretic. No erythema.   Psychiatric: He has a normal mood and affect. His behavior is normal. Thought content normal.   Nursing note and vitals reviewed.      Fluids    Intake/Output Summary (Last 24 hours) at 9/30/2019 1237  Last data filed at 9/30/2019 0500  Gross per 24 hour   Intake 1240 ml   Output 3250 ml   Net -2010 ml       Laboratory  Recent Labs     09/28/19  0155 09/29/19  0314 09/30/19  0425   WBC 10.7 10.4 11.1*   RBC 4.10* 3.59* 3.80*   HEMOGLOBIN 10.8* 9.5* 9.9*   HEMATOCRIT 35.5* 30.3* 32.7*   MCV 86.6 84.4 86.1   MCH 26.3* 26.5* 26.1*   MCHC 30.4* 31.4* 30.3*   RDW 65.9* 64.1* 64.7*   PLATELETCT 266 260 288   MPV 8.7* 9.1 9.4     Recent Labs     09/28/19  0846 09/29/19  0314 09/30/19  0425   SODIUM 136 136 137   POTASSIUM 3.6 3.8 4.1   CHLORIDE 104 101 102   CO2 23 27 26   GLUCOSE 104* 87 86   BUN 14 11 11    CREATININE 0.67 0.69 0.63   CALCIUM 7.9* 7.8* 8.1*                   Imaging  MR-THORACIC SPINE-W/O   Final Result      1.  No acute fracture in the thoracic spine.   2.  Mild chronic compression deformity at T11.      MR-LUMBAR SPINE-W/O   Final Result      1.  Acute bilateral sacral alae fractures.   2.  There is edema in the lower posterior paraspinal soft tissue likely representing soft tissue contusion.   3.  There is no lumbar spine fractures.         CT-LSPINE W/O PLUS RECONS   Final Result      No acute fracture or traumatic malalignment.      CT-PELVIS WITH   Final Result      1.  Recent but probably subacute fracture of the left superior pubic ramus near the symphysis. Adjacent and just lateral to this are two complex masslike structures, the largest and most medial of which contains a fluid-filled cavity. These are    consistent with hematomata, possibly intramuscular.      US-EXTREMITY VENOUS LOWER UNILAT LEFT   Final Result      1.  No evidence of Left  lower extremity deep venous thrombosis.   2.  Two complex areas within the left groin, consistent with hematomata.            INTERPRETING LOCATION: [address] [facility], [address]           Assessment/Plan  * Pubic ramus fracture (HCC)  Assessment & Plan  S/p mechanical GLF  MS Continue 15mg BID continue with oxycodone,   Limit IV pain medications  PT today    Iron deficiency anemia, unspecified iron deficiency anemia type- (present on admission)  Assessment & Plan  H/H overall improved compared to prior admission.  Resume iron supplemetation      Essential hypertension- (present on admission)  Assessment & Plan  Well Controlled  Resume home dose of Coreg, Losartan and lasix.     Chronic pain of right knee- (present on admission)  Assessment & Plan  As above, MS contin + short acting oxycodone for breakthrough, patient is already on Lyrica.      Rheumatoid arthritis (HCC)- (present on admission)  Assessment & Plan  Continue Humira as outpatient        Patient plan of care discussed at multidisplinary team rounds and with patient and R.N at beside.    VTE prophylaxis: SCDs

## 2019-10-01 ENCOUNTER — APPOINTMENT (OUTPATIENT)
Dept: RADIOLOGY | Facility: MEDICAL CENTER | Age: 44
DRG: 515 | End: 2019-10-01
Attending: INTERNAL MEDICINE
Payer: COMMERCIAL

## 2019-10-01 PROBLEM — R60.0 BILATERAL LEG EDEMA: Status: ACTIVE | Noted: 2019-10-01

## 2019-10-01 PROBLEM — S70.12XA HEMATOMA OF LEFT THIGH: Status: ACTIVE | Noted: 2019-10-01

## 2019-10-01 LAB
ANION GAP SERPL CALC-SCNC: 9 MMOL/L (ref 0–11.9)
BASOPHILS # BLD AUTO: 0.7 % (ref 0–1.8)
BASOPHILS # BLD: 0.09 K/UL (ref 0–0.12)
BUN SERPL-MCNC: 10 MG/DL (ref 8–22)
CALCIUM SERPL-MCNC: 8 MG/DL (ref 8.4–10.2)
CHLORIDE SERPL-SCNC: 100 MMOL/L (ref 96–112)
CK SERPL-CCNC: 26 U/L (ref 0–154)
CO2 SERPL-SCNC: 27 MMOL/L (ref 20–33)
CREAT SERPL-MCNC: 0.6 MG/DL (ref 0.5–1.4)
EOSINOPHIL # BLD AUTO: 0.07 K/UL (ref 0–0.51)
EOSINOPHIL NFR BLD: 0.6 % (ref 0–6.9)
ERYTHROCYTE [DISTWIDTH] IN BLOOD BY AUTOMATED COUNT: 63.3 FL (ref 35.9–50)
GLUCOSE SERPL-MCNC: 73 MG/DL (ref 65–99)
HCT VFR BLD AUTO: 33.3 % (ref 42–52)
HGB BLD-MCNC: 10.1 G/DL (ref 14–18)
IMM GRANULOCYTES # BLD AUTO: 0.56 K/UL (ref 0–0.11)
IMM GRANULOCYTES NFR BLD AUTO: 4.5 % (ref 0–0.9)
LYMPHOCYTES # BLD AUTO: 2.42 K/UL (ref 1–4.8)
LYMPHOCYTES NFR BLD: 19.6 % (ref 22–41)
MCH RBC QN AUTO: 26.3 PG (ref 27–33)
MCHC RBC AUTO-ENTMCNC: 30.3 G/DL (ref 33.7–35.3)
MCV RBC AUTO: 86.7 FL (ref 81.4–97.8)
MONOCYTES # BLD AUTO: 0.99 K/UL (ref 0–0.85)
MONOCYTES NFR BLD AUTO: 8 % (ref 0–13.4)
NEUTROPHILS # BLD AUTO: 8.23 K/UL (ref 1.82–7.42)
NEUTROPHILS NFR BLD: 66.6 % (ref 44–72)
NRBC # BLD AUTO: 0 K/UL
NRBC BLD-RTO: 0 /100 WBC
PLATELET # BLD AUTO: 319 K/UL (ref 164–446)
PMV BLD AUTO: 8.9 FL (ref 9–12.9)
POTASSIUM SERPL-SCNC: 5 MMOL/L (ref 3.6–5.5)
RBC # BLD AUTO: 3.84 M/UL (ref 4.7–6.1)
SODIUM SERPL-SCNC: 136 MMOL/L (ref 135–145)
WBC # BLD AUTO: 12.4 K/UL (ref 4.8–10.8)

## 2019-10-01 PROCEDURE — 0Y963ZZ DRAINAGE OF LEFT INGUINAL REGION, PERCUTANEOUS APPROACH: ICD-10-PCS | Performed by: RADIOLOGY

## 2019-10-01 PROCEDURE — A9270 NON-COVERED ITEM OR SERVICE: HCPCS | Performed by: HOSPITALIST

## 2019-10-01 PROCEDURE — 99226 PR SUBSEQUENT OBSERVATION CARE,LEVEL III: CPT | Performed by: INTERNAL MEDICINE

## 2019-10-01 PROCEDURE — 97116 GAIT TRAINING THERAPY: CPT

## 2019-10-01 PROCEDURE — 700102 HCHG RX REV CODE 250 W/ 637 OVERRIDE(OP): Performed by: INTERNAL MEDICINE

## 2019-10-01 PROCEDURE — A9270 NON-COVERED ITEM OR SERVICE: HCPCS | Performed by: INTERNAL MEDICINE

## 2019-10-01 PROCEDURE — 36415 COLL VENOUS BLD VENIPUNCTURE: CPT

## 2019-10-01 PROCEDURE — G0378 HOSPITAL OBSERVATION PER HR: HCPCS

## 2019-10-01 PROCEDURE — 700111 HCHG RX REV CODE 636 W/ 250 OVERRIDE (IP): Performed by: HOSPITALIST

## 2019-10-01 PROCEDURE — 82550 ASSAY OF CK (CPK): CPT

## 2019-10-01 PROCEDURE — 80048 BASIC METABOLIC PNL TOTAL CA: CPT

## 2019-10-01 PROCEDURE — 96376 TX/PRO/DX INJ SAME DRUG ADON: CPT

## 2019-10-01 PROCEDURE — 700102 HCHG RX REV CODE 250 W/ 637 OVERRIDE(OP): Performed by: HOSPITALIST

## 2019-10-01 PROCEDURE — 85025 COMPLETE CBC W/AUTO DIFF WBC: CPT

## 2019-10-01 PROCEDURE — 97530 THERAPEUTIC ACTIVITIES: CPT

## 2019-10-01 PROCEDURE — 76705 ECHO EXAM OF ABDOMEN: CPT

## 2019-10-01 RX ORDER — IBUPROFEN 200 MG
950 CAPSULE ORAL DAILY
Status: DISCONTINUED | OUTPATIENT
Start: 2019-10-01 | End: 2019-10-17 | Stop reason: HOSPADM

## 2019-10-01 RX ADMIN — OXYCODONE HYDROCHLORIDE 5 MG: 5 TABLET ORAL at 09:03

## 2019-10-01 RX ADMIN — HYDROMORPHONE HYDROCHLORIDE 0.5 MG: 1 INJECTION, SOLUTION INTRAMUSCULAR; INTRAVENOUS; SUBCUTANEOUS at 18:40

## 2019-10-01 RX ADMIN — PREGABALIN 25 MG: 25 CAPSULE ORAL at 05:07

## 2019-10-01 RX ADMIN — NICOTINE 14 MG: 14 PATCH, EXTENDED RELEASE TRANSDERMAL at 05:07

## 2019-10-01 RX ADMIN — VITAMIN D, TAB 1000IU (100/BT) 1000 UNITS: 25 TAB at 10:19

## 2019-10-01 RX ADMIN — OMEPRAZOLE 40 MG: 20 CAPSULE, DELAYED RELEASE ORAL at 18:40

## 2019-10-01 RX ADMIN — PREGABALIN 25 MG: 25 CAPSULE ORAL at 18:41

## 2019-10-01 RX ADMIN — FERROUS SULFATE TAB 325 MG (65 MG ELEMENTAL FE) 325 MG: 325 (65 FE) TAB at 05:07

## 2019-10-01 RX ADMIN — MORPHINE SULFATE 30 MG: 30 TABLET, EXTENDED RELEASE ORAL at 05:07

## 2019-10-01 RX ADMIN — PREGABALIN 25 MG: 25 CAPSULE ORAL at 11:35

## 2019-10-01 RX ADMIN — SENNOSIDES, DOCUSATE SODIUM 2 TABLET: 50; 8.6 TABLET, FILM COATED ORAL at 18:40

## 2019-10-01 RX ADMIN — CALCIUM CITRATE 200 MG (950 MG) TABLET 950 MG: at 10:18

## 2019-10-01 RX ADMIN — MORPHINE SULFATE 30 MG: 30 TABLET, EXTENDED RELEASE ORAL at 18:41

## 2019-10-01 RX ADMIN — OXYCODONE HYDROCHLORIDE 5 MG: 5 TABLET ORAL at 02:39

## 2019-10-01 RX ADMIN — OXYCODONE HYDROCHLORIDE 5 MG: 5 TABLET ORAL at 13:37

## 2019-10-01 RX ADMIN — SENNOSIDES, DOCUSATE SODIUM 2 TABLET: 50; 8.6 TABLET, FILM COATED ORAL at 05:07

## 2019-10-01 RX ADMIN — OMEPRAZOLE 40 MG: 20 CAPSULE, DELAYED RELEASE ORAL at 05:07

## 2019-10-01 RX ADMIN — LOSARTAN POTASSIUM 25 MG: 25 TABLET, FILM COATED ORAL at 05:07

## 2019-10-01 ASSESSMENT — GAIT ASSESSMENTS
DEVIATION: ANTALGIC
GAIT LEVEL OF ASSIST: SUPERVISED
DISTANCE (FEET): 15
ASSISTIVE DEVICE: FRONT WHEEL WALKER

## 2019-10-01 ASSESSMENT — ENCOUNTER SYMPTOMS
FOCAL WEAKNESS: 0
BACK PAIN: 0
CHILLS: 0
COUGH: 0
DIAPHORESIS: 0
FEVER: 0
PALPITATIONS: 0
ABDOMINAL PAIN: 0
HEADACHES: 0
DIZZINESS: 0
WHEEZING: 0
BRUISES/BLEEDS EASILY: 0
DIARRHEA: 0
VOMITING: 0
CONSTIPATION: 0
MYALGIAS: 1
TREMORS: 0
WEAKNESS: 0
NAUSEA: 0
STRIDOR: 0
SHORTNESS OF BREATH: 0

## 2019-10-01 NOTE — THERAPY
"Physical Therapy Treatment completed.   Bed Mobility:  Supine to Sit: Minimal Assist  Transfers: Sit to Stand: Supervised   Gait: Level Of Assist: Supervised with Front-Wheel Walker    X 15 feet  Plan of Care: Will benefit from Physical Therapy 7 times per week  Discharge Recommendations: Equipment: Will Continue to Assess for Equipment Needs.   Pt still struggling with pain control when ambulating or transfering  See \"Rehab Therapy-Acute\" Patient Summary Report for complete documentation.       "

## 2019-10-01 NOTE — FACE TO FACE
Face to Face Supporting Documentation - Home Health    The encounter with this patient was in whole or in part the primary reason for home health admission.    Date of encounter:   Patient:                    MRN:                       YOB: 2018  Richard Hubbard II  [id number]  1975     Home health to see patient for:  Skilled Nursing care for assessment, interventions & education, Physical Therapy evaluation and treatment and Occupational therapy evaluation and treatment    Skilled need for:  New Onset Medical Diagnosis pubic ramus fracture    Skilled nursing interventions to include:  Comment: mobility adaptation at home and pain control    Homebound status evidenced by:  Need the aid of supportive devices such as crutches, canes, wheelchairs or walkers. Leaving home requires a considerable and taxing effort. There is a normal inability to leave the home.    Community Physician to provide follow up care: MARTÍN Turner     Optional Interventions? No      I certify the face to face encounter for this home health care referral meets the CMS requirements and the encounter/clinical assessment with the patient was, in whole, or in part, for the medical condition(s) listed above, which is the primary reason for home health care. Based on my clinical findings: the service(s) are medically necessary, support the need for home health care, and the homebound criteria are met.  I certify that this patient has had a face to face encounter by myself.  Rhina Varma M.D. - NPI: [id number]

## 2019-10-01 NOTE — CARE PLAN
Problem: Venous Thromboembolism (VTW)/Deep Vein Thrombosis (DVT) Prevention:  Goal: Patient will participate in Venous Thrombosis (VTE)/Deep Vein Thrombosis (DVT)Prevention Measures  Note:   Pt with SCD's on, skin check performed.     Problem: Pain Management  Goal: Pain level will decrease to patient's comfort goal  Note:   Pt with 7/10 throbbing, shooting pain in left upper leg. Pt given oxycodone per MAR. Pt also with headaches for the last 2 days, but refusing Tylenol at this time. Pt encouraged to use call light if additional pain interventions needed.

## 2019-10-01 NOTE — DISCHARGE PLANNING
Transitional Care coordination  [facility]F is able to accept pt 10/2 Wednesday pending Medical clearance.     Tc to Cheri @  ext [phone] informing her of acceptance; she will confirm medical clearance w/ plan of admitting pt to IRF tomorrow.

## 2019-10-01 NOTE — FACE TO FACE
Face to Face Note  -  Durable Medical Equipment    Rhina Varma M.D. - NPI: [id number]  I certify that this patient is under my care and that they had a durable medical equipment(DME)face to face encounter by myself that meets the physician DME face-to-face encounter requirements with this patient on:    Date of encounter:   Patient:                    MRN:                       YOB: 2018  Richard Hubbard II  [id number]  1975     The encounter with the patient was in whole, or in part, for the following medical condition, which is the primary reason for durable medical equipment:  Post-Op Surgery    I certify that, based on my findings, the following durable medical equipment is medically necessary:  Walkers, Wheel Chair and 3 in 1 Bedside [place].    HOME O2 Saturation Measurements:(Values must be present for Home Oxygen orders)         ,     ,         My Clinical findings support the need for the above equipment due to:  Abnormal Gait    Supporting Symptoms: the patient cannot ambulate steadily

## 2019-10-01 NOTE — DISCHARGE PLANNING
Received a call from Bee with [facility].  She will craig [facility] Acute rehab auth if Physiatry agrees with an admission.

## 2019-10-01 NOTE — DISCHARGE PLANNING
[facility] Transitional Care Coordination     Referral from:  Dr. Maria Guadalupe Varma  Facesheet indicates:   [facility]  Potential Rehab Diagnosis:  Subacute fracture of the left superior pubic ramus/    Chart review indicates patient does have on going medical management and does have therapy needs to possibly meet inpatient rehab facility criteria with the goal of returning to community.    D/C support:  Supportive spouse / mother.       Physiatry consultation forwarded per protocol.      Thank you for referral.

## 2019-10-02 ENCOUNTER — APPOINTMENT (OUTPATIENT)
Dept: PHYSICAL MEDICINE AND REHAB | Facility: MEDICAL CENTER | Age: 44
End: 2019-10-02
Payer: COMMERCIAL

## 2019-10-02 ENCOUNTER — APPOINTMENT (OUTPATIENT)
Dept: RADIOLOGY | Facility: MEDICAL CENTER | Age: 44
DRG: 515 | End: 2019-10-02
Attending: INTERNAL MEDICINE
Payer: COMMERCIAL

## 2019-10-02 PROBLEM — L02.416 ABSCESS OF LEFT THIGH: Status: ACTIVE | Noted: 2019-10-02

## 2019-10-02 PROBLEM — A41.9 SEPSIS (HCC): Status: ACTIVE | Noted: 2019-10-02

## 2019-10-02 LAB
ANION GAP SERPL CALC-SCNC: 11 MMOL/L (ref 0–11.9)
ANISOCYTOSIS BLD QL SMEAR: ABNORMAL
APPEARANCE FLD: NORMAL
BASOPHILS # BLD AUTO: 1 % (ref 0–1.8)
BASOPHILS # BLD: 0.14 K/UL (ref 0–0.12)
BODY FLD TYPE: NORMAL
BUN SERPL-MCNC: 12 MG/DL (ref 8–22)
CALCIUM SERPL-MCNC: 8 MG/DL (ref 8.4–10.2)
CHLORIDE SERPL-SCNC: 99 MMOL/L (ref 96–112)
CO2 SERPL-SCNC: 23 MMOL/L (ref 20–33)
COLOR FLD: NORMAL
CREAT SERPL-MCNC: 0.62 MG/DL (ref 0.5–1.4)
CSF COMMENTS 1658: NORMAL
EOSINOPHIL # BLD AUTO: 0.14 K/UL (ref 0–0.51)
EOSINOPHIL NFR BLD: 1 % (ref 0–6.9)
ERYTHROCYTE [DISTWIDTH] IN BLOOD BY AUTOMATED COUNT: 63.4 FL (ref 35.9–50)
GLUCOSE SERPL-MCNC: 88 MG/DL (ref 65–99)
HCT VFR BLD AUTO: 32 % (ref 42–52)
HGB BLD-MCNC: 9.8 G/DL (ref 14–18)
INR PPP: 1.03 (ref 0.87–1.13)
LACTATE BLD-SCNC: 1.4 MMOL/L (ref 0.5–2)
LACTATE BLD-SCNC: 1.6 MMOL/L (ref 0.5–2)
LYMPHOCYTES # BLD AUTO: 2.84 K/UL (ref 1–4.8)
LYMPHOCYTES NFR BLD: 20 % (ref 22–41)
MANUAL DIFF BLD: NORMAL
MCH RBC QN AUTO: 26.4 PG (ref 27–33)
MCHC RBC AUTO-ENTMCNC: 30.6 G/DL (ref 33.7–35.3)
MCV RBC AUTO: 86.3 FL (ref 81.4–97.8)
MICROCYTES BLD QL SMEAR: ABNORMAL
MONOCYTES # BLD AUTO: 1.7 K/UL (ref 0–0.85)
MONOCYTES NFR BLD AUTO: 12 % (ref 0–13.4)
NEUTROPHILS # BLD AUTO: 9.37 K/UL (ref 1.82–7.42)
NEUTROPHILS NFR BLD: 64 % (ref 44–72)
NEUTS BAND NFR BLD MANUAL: 2 % (ref 0–10)
NRBC # BLD AUTO: 0 K/UL
NRBC BLD-RTO: 0 /100 WBC
OVALOCYTES BLD QL SMEAR: NORMAL
PLATELET # BLD AUTO: 352 K/UL (ref 164–446)
PLATELET BLD QL SMEAR: NORMAL
PMV BLD AUTO: 8.8 FL (ref 9–12.9)
POIKILOCYTOSIS BLD QL SMEAR: NORMAL
POLYCHROMASIA BLD QL SMEAR: NORMAL
POTASSIUM SERPL-SCNC: 4.6 MMOL/L (ref 3.6–5.5)
PROTHROMBIN TIME: 13.6 SEC (ref 12–14.6)
RBC # BLD AUTO: 3.71 M/UL (ref 4.7–6.1)
RBC # FLD: NORMAL CELLS/UL
RBC BLD AUTO: PRESENT
SODIUM SERPL-SCNC: 133 MMOL/L (ref 135–145)
WBC # BLD AUTO: 14.2 K/UL (ref 4.8–10.8)
WBC # FLD: NORMAL CELLS/UL

## 2019-10-02 PROCEDURE — 96367 TX/PROPH/DG ADDL SEQ IV INF: CPT

## 2019-10-02 PROCEDURE — A9270 NON-COVERED ITEM OR SERVICE: HCPCS | Performed by: HOSPITALIST

## 2019-10-02 PROCEDURE — 87186 SC STD MICRODIL/AGAR DIL: CPT

## 2019-10-02 PROCEDURE — 96365 THER/PROPH/DIAG IV INF INIT: CPT

## 2019-10-02 PROCEDURE — A9270 NON-COVERED ITEM OR SERVICE: HCPCS | Performed by: INTERNAL MEDICINE

## 2019-10-02 PROCEDURE — 96376 TX/PRO/DX INJ SAME DRUG ADON: CPT

## 2019-10-02 PROCEDURE — 89051 BODY FLUID CELL COUNT: CPT

## 2019-10-02 PROCEDURE — 85610 PROTHROMBIN TIME: CPT

## 2019-10-02 PROCEDURE — 83605 ASSAY OF LACTIC ACID: CPT | Mod: 91

## 2019-10-02 PROCEDURE — G0378 HOSPITAL OBSERVATION PER HR: HCPCS

## 2019-10-02 PROCEDURE — 700102 HCHG RX REV CODE 250 W/ 637 OVERRIDE(OP): Performed by: INTERNAL MEDICINE

## 2019-10-02 PROCEDURE — 87205 SMEAR GRAM STAIN: CPT

## 2019-10-02 PROCEDURE — 99226 PR SUBSEQUENT OBSERVATION CARE,LEVEL III: CPT | Performed by: INTERNAL MEDICINE

## 2019-10-02 PROCEDURE — 85027 COMPLETE CBC AUTOMATED: CPT

## 2019-10-02 PROCEDURE — 700102 HCHG RX REV CODE 250 W/ 637 OVERRIDE(OP): Performed by: HOSPITALIST

## 2019-10-02 PROCEDURE — 87070 CULTURE OTHR SPECIMN AEROBIC: CPT

## 2019-10-02 PROCEDURE — 10030 IMG GID FLU COLL DRG SFT TIS: CPT

## 2019-10-02 PROCEDURE — 700111 HCHG RX REV CODE 636 W/ 250 OVERRIDE (IP): Performed by: INTERNAL MEDICINE

## 2019-10-02 PROCEDURE — 87075 CULTR BACTERIA EXCEPT BLOOD: CPT

## 2019-10-02 PROCEDURE — 96366 THER/PROPH/DIAG IV INF ADDON: CPT

## 2019-10-02 PROCEDURE — 36415 COLL VENOUS BLD VENIPUNCTURE: CPT

## 2019-10-02 PROCEDURE — 87040 BLOOD CULTURE FOR BACTERIA: CPT | Mod: 91

## 2019-10-02 PROCEDURE — 700105 HCHG RX REV CODE 258: Performed by: INTERNAL MEDICINE

## 2019-10-02 PROCEDURE — 85007 BL SMEAR W/DIFF WBC COUNT: CPT

## 2019-10-02 PROCEDURE — 700111 HCHG RX REV CODE 636 W/ 250 OVERRIDE (IP)

## 2019-10-02 PROCEDURE — 87077 CULTURE AEROBIC IDENTIFY: CPT

## 2019-10-02 PROCEDURE — 700111 HCHG RX REV CODE 636 W/ 250 OVERRIDE (IP): Performed by: HOSPITALIST

## 2019-10-02 PROCEDURE — 80048 BASIC METABOLIC PNL TOTAL CA: CPT

## 2019-10-02 PROCEDURE — 10140 I&D HMTMA SEROMA/FLUID COLLJ: CPT

## 2019-10-02 RX ORDER — NALOXONE HYDROCHLORIDE 1 MG/ML
INJECTION INTRAMUSCULAR; INTRAVENOUS; SUBCUTANEOUS
Status: DISPENSED
Start: 2019-10-02 | End: 2019-10-03

## 2019-10-02 RX ORDER — MICONAZOLE NITRATE 20 MG/G
CREAM TOPICAL EVERY 6 HOURS
Status: DISCONTINUED | OUTPATIENT
Start: 2019-10-02 | End: 2019-10-02

## 2019-10-02 RX ORDER — MIDAZOLAM HYDROCHLORIDE 1 MG/ML
.5-2 INJECTION INTRAMUSCULAR; INTRAVENOUS PRN
Status: ACTIVE | OUTPATIENT
Start: 2019-10-02 | End: 2019-10-02

## 2019-10-02 RX ORDER — LIDOCAINE HYDROCHLORIDE 10 MG/ML
INJECTION, SOLUTION INFILTRATION; PERINEURAL
Status: DISPENSED
Start: 2019-10-02 | End: 2019-10-03

## 2019-10-02 RX ORDER — FLUMAZENIL 0.1 MG/ML
INJECTION INTRAVENOUS
Status: DISPENSED
Start: 2019-10-02 | End: 2019-10-03

## 2019-10-02 RX ORDER — ONDANSETRON 2 MG/ML
4 INJECTION INTRAMUSCULAR; INTRAVENOUS PRN
Status: ACTIVE | OUTPATIENT
Start: 2019-10-02 | End: 2019-10-02

## 2019-10-02 RX ORDER — MIDAZOLAM HYDROCHLORIDE 1 MG/ML
INJECTION INTRAMUSCULAR; INTRAVENOUS
Status: COMPLETED
Start: 2019-10-02 | End: 2019-10-02

## 2019-10-02 RX ORDER — SODIUM CHLORIDE 9 MG/ML
500 INJECTION, SOLUTION INTRAVENOUS
Status: ACTIVE | OUTPATIENT
Start: 2019-10-02 | End: 2019-10-02

## 2019-10-02 RX ORDER — FLUCONAZOLE 200 MG/1
200 TABLET ORAL DAILY
Status: DISCONTINUED | OUTPATIENT
Start: 2019-10-02 | End: 2019-10-03

## 2019-10-02 RX ADMIN — SENNOSIDES, DOCUSATE SODIUM 2 TABLET: 50; 8.6 TABLET, FILM COATED ORAL at 06:31

## 2019-10-02 RX ADMIN — CALCIUM CITRATE 200 MG (950 MG) TABLET 950 MG: at 06:32

## 2019-10-02 RX ADMIN — HYDROMORPHONE HYDROCHLORIDE 0.5 MG: 1 INJECTION, SOLUTION INTRAMUSCULAR; INTRAVENOUS; SUBCUTANEOUS at 08:14

## 2019-10-02 RX ADMIN — OMEPRAZOLE 40 MG: 20 CAPSULE, DELAYED RELEASE ORAL at 17:48

## 2019-10-02 RX ADMIN — MORPHINE SULFATE 30 MG: 30 TABLET, EXTENDED RELEASE ORAL at 06:33

## 2019-10-02 RX ADMIN — AMPICILLIN AND SULBACTAM 3 G: 1; 2 INJECTION, POWDER, FOR SOLUTION INTRAMUSCULAR; INTRAVENOUS at 21:04

## 2019-10-02 RX ADMIN — HYDROMORPHONE HYDROCHLORIDE 0.5 MG: 1 INJECTION, SOLUTION INTRAMUSCULAR; INTRAVENOUS; SUBCUTANEOUS at 11:43

## 2019-10-02 RX ADMIN — AMPICILLIN AND SULBACTAM 3 G: 1; 2 INJECTION, POWDER, FOR SOLUTION INTRAMUSCULAR; INTRAVENOUS at 15:21

## 2019-10-02 RX ADMIN — FENTANYL CITRATE 100 MCG: 50 INJECTION, SOLUTION INTRAMUSCULAR; INTRAVENOUS at 13:03

## 2019-10-02 RX ADMIN — FERROUS SULFATE TAB 325 MG (65 MG ELEMENTAL FE) 325 MG: 325 (65 FE) TAB at 06:32

## 2019-10-02 RX ADMIN — HYDROMORPHONE HYDROCHLORIDE 0.5 MG: 1 INJECTION, SOLUTION INTRAMUSCULAR; INTRAVENOUS; SUBCUTANEOUS at 03:15

## 2019-10-02 RX ADMIN — NICOTINE 14 MG: 14 PATCH, EXTENDED RELEASE TRANSDERMAL at 06:35

## 2019-10-02 RX ADMIN — PREGABALIN 25 MG: 25 CAPSULE ORAL at 06:32

## 2019-10-02 RX ADMIN — OXYCODONE HYDROCHLORIDE 5 MG: 5 TABLET ORAL at 14:04

## 2019-10-02 RX ADMIN — OXYCODONE HYDROCHLORIDE 5 MG: 5 TABLET ORAL at 23:51

## 2019-10-02 RX ADMIN — FLUCONAZOLE 200 MG: 200 TABLET ORAL at 11:44

## 2019-10-02 RX ADMIN — OMEPRAZOLE 40 MG: 20 CAPSULE, DELAYED RELEASE ORAL at 06:30

## 2019-10-02 RX ADMIN — MIDAZOLAM 2 MG: 1 INJECTION INTRAMUSCULAR; INTRAVENOUS at 12:30

## 2019-10-02 RX ADMIN — PREGABALIN 25 MG: 25 CAPSULE ORAL at 17:47

## 2019-10-02 RX ADMIN — MORPHINE SULFATE 4 MG: 4 INJECTION INTRAVENOUS at 21:48

## 2019-10-02 RX ADMIN — MICONAZOLE NITRATE: 20 CREAM TOPICAL at 18:00

## 2019-10-02 RX ADMIN — VITAMIN D, TAB 1000IU (100/BT) 1000 UNITS: 25 TAB at 06:31

## 2019-10-02 RX ADMIN — MORPHINE SULFATE 30 MG: 30 TABLET, EXTENDED RELEASE ORAL at 17:48

## 2019-10-02 RX ADMIN — VANCOMYCIN HYDROCHLORIDE 2500 MG: 500 INJECTION, POWDER, LYOPHILIZED, FOR SOLUTION INTRAVENOUS at 16:38

## 2019-10-02 RX ADMIN — PREGABALIN 25 MG: 25 CAPSULE ORAL at 11:44

## 2019-10-02 RX ADMIN — MICONAZOLE NITRATE: 20 CREAM TOPICAL at 11:44

## 2019-10-02 RX ADMIN — HYDROMORPHONE HYDROCHLORIDE 0.5 MG: 1 INJECTION, SOLUTION INTRAMUSCULAR; INTRAVENOUS; SUBCUTANEOUS at 02:22

## 2019-10-02 RX ADMIN — AMPICILLIN AND SULBACTAM 3 G: 1; 2 INJECTION, POWDER, FOR SOLUTION INTRAMUSCULAR; INTRAVENOUS at 23:12

## 2019-10-02 ASSESSMENT — ENCOUNTER SYMPTOMS
ABDOMINAL PAIN: 0
WHEEZING: 0
STRIDOR: 0
SHORTNESS OF BREATH: 0
DIAPHORESIS: 0
CONSTIPATION: 0
DIZZINESS: 0
NAUSEA: 0
BRUISES/BLEEDS EASILY: 0
MYALGIAS: 1
FEVER: 0
BACK PAIN: 0
COUGH: 0
TREMORS: 0
WEAKNESS: 0
DIARRHEA: 0
FOCAL WEAKNESS: 0

## 2019-10-02 NOTE — PROGRESS NOTES
Called Westley from CT to inquire about pt's possible schedule for CT d/t pt being anxious of knowing his exact schedule and hesitant about being NPO. Westley couldn't give a definite sched yet at the moment. Pt was informed.

## 2019-10-02 NOTE — PROGRESS NOTES
[facility] Medicine Daily Progress Note    Date of Service  10/2/2019    Chief Complaint  44 y.o. male admitted 9/27/2019 with left thigh pain    Hospital Course    44 y.o. male who presented to the emergency department on 9/27/2019 for evaluation of worsening left leg pain.  Patient had a fall about a month ago.  Patient has a problem with his right knee, on schedule for total knee replacement and ever since walking with a cane or walker. He had increasing pain in the left thigh and CT scan showed a left pubic ramus fracture with two large hematomas. Orthopedic surgery did see the patient and recommends no surgical intervention.      Interval Problem Update  The patient has a new red rash similar to a rash in the past when his rheumatoid arthritis flaired  He continues to have severe pain trying to ambulate to the toilet with a walker  Heart rate was 105 last night and today white cell count is over 14    Consultants/Specialty  Orthopedic surgery Dr. Olivares  Radiology Dr. Olguin    Code Status  full    Disposition  rehab    Review of Systems  Review of Systems   Constitutional: Positive for malaise/fatigue. Negative for diaphoresis and fever.   HENT: Negative for congestion.    Respiratory: Negative for cough, shortness of breath, wheezing and stridor.    Cardiovascular: Negative for chest pain and leg swelling.   Gastrointestinal: Negative for abdominal pain, constipation, diarrhea and nausea.   Genitourinary: Negative for dysuria and frequency.   Musculoskeletal: Positive for myalgias. Negative for back pain.        Left thigh pain   Skin: Positive for rash. Negative for itching.        New red rash over thighs   Neurological: Negative for dizziness, tremors, focal weakness and weakness.   Endo/Heme/Allergies: Does not bruise/bleed easily.        Physical Exam  Temp:  [36.3 °C (97.4 °F)-36.7 °C (98.1 °F)] 36.7 °C (98.1 °F)  Pulse:  [] 82  Resp:  [8-20] 14  BP: ()/(61-73) 110/72  SpO2:  [97 %-100 %]  99 %    Physical Exam   Constitutional: No distress.   HENT:   Head: Normocephalic.   Mouth/Throat: Oropharynx is clear and moist.   Eyes: Pupils are equal, round, and reactive to light. No scleral icterus.   Neck: Neck supple. No tracheal deviation present.   Cardiovascular: Normal rate, regular rhythm and intact distal pulses.   No murmur heard.  Pulmonary/Chest: Effort normal and breath sounds normal. No stridor. No respiratory distress. He has no wheezes.   Abdominal: Soft. Bowel sounds are normal. There is no tenderness.   Musculoskeletal: He exhibits tenderness. He exhibits no edema.   Tender to touch over left medial thigh   Neurological: He is alert.   Skin: Skin is warm. Rash noted. He is not diaphoretic. There is erythema.   Dry skin over hands and feet  Erythema over anterior thighs   Psychiatric: His behavior is normal. Thought content normal.   Nursing note and vitals reviewed.      Fluids    Intake/Output Summary (Last 24 hours) at 10/2/2019 1344  Last data filed at 10/2/2019 1300  Gross per 24 hour   Intake --   Output 1910 ml   Net -1910 ml       Laboratory  Recent Labs     09/30/19  0425 10/01/19  0423 10/02/19  0411   WBC 11.1* 12.4* 14.2*   RBC 3.80* 3.84* 3.71*   HEMOGLOBIN 9.9* 10.1* 9.8*   HEMATOCRIT 32.7* 33.3* 32.0*   MCV 86.1 86.7 86.3   MCH 26.1* 26.3* 26.4*   MCHC 30.3* 30.3* 30.6*   RDW 64.7* 63.3* 63.4*   PLATELETCT 288 319 352   MPV 9.4 8.9* 8.8*     Recent Labs     09/30/19  0425 10/01/19  0423 10/02/19  0411   SODIUM 137 136 133*   POTASSIUM 4.1 5.0 4.6   CHLORIDE 102 100 99   CO2 26 27 23   GLUCOSE 86 73 88   BUN 11 10 12   CREATININE 0.63 0.60 0.62   CALCIUM 8.1* 8.0* 8.0*     Recent Labs     10/02/19  0859   INR 1.03               Imaging  US-RUQ   Final Result      Hepatomegaly.      Status post cholecystectomy.      Dilated portal vein.      Status post cholecystectomy.      No biliary ductal dilatation.      Limited evaluation of the pancreas which is obscured.      MR-THORACIC  SPINE-W/O   Final Result      1.  No acute fracture in the thoracic spine.   2.  Mild chronic compression deformity at T11.      MR-LUMBAR SPINE-W/O   Final Result      1.  Acute bilateral sacral alae fractures.   2.  There is edema in the lower posterior paraspinal soft tissue likely representing soft tissue contusion.   3.  There is no lumbar spine fractures.         CT-LSPINE W/O PLUS RECONS   Final Result      No acute fracture or traumatic malalignment.      CT-PELVIS WITH   Final Result      1.  Recent but probably subacute fracture of the left superior pubic ramus near the symphysis. Adjacent and just lateral to this are two complex masslike structures, the largest and most medial of which contains a fluid-filled cavity. These are    consistent with hematomata, possibly intramuscular.      US-EXTREMITY VENOUS LOWER UNILAT LEFT   Final Result      1.  No evidence of Left  lower extremity deep venous thrombosis.   2.  Two complex areas within the left groin, consistent with hematomata.            INTERPRETING LOCATION: [address], [place], [address]      CT-DRAIN-HEMATOMA - SEROMA    (Results Pending)        Assessment/Plan  * Pubic ramus fracture (HCC)  Assessment & Plan  S/p mechanical GLF  MS Continue 15mg BID continue with oxycodone,   Rehab referral placed, will monitor mobility improvement after abscess drainage and antibiotic therapy  Continue PT/OT  calcium and vit D for thin bones due to chronic prednisone use for rheumatoid arthritis    Sepsis (HCC)  Assessment & Plan  This is Sepsis Not present on admission  SIRS criteria identified on my evaluation include: Tachycardia, with heart rate greater than 90 BPM and Leukocyosis, with WBC greater than 12,000  Source is left thigh abscess  Sepsis protocol initiated  Fluid resuscitation ordered per protocol  IV antibiotics as appropriate for source of sepsis, zosyn and vancomycin, cultures of fluid sent today  While organ dysfunction may be noted elsewhere in  this problem list or in the chart, degree of organ dysfunction does not meet CMS criteria for severe sepsis          Abscess of left thigh  Assessment & Plan  Culture sent will monitor results  Will start zosyn and vancomycin as confirmed no true allergy to penicillins with patient's wife and mother  Drain in place, will monitor output  Other fluid collection is small not needing drainage per Dr. Olguin    Hematoma of left thigh  Assessment & Plan  Due to ground level fall,   Now with abscess, drain in place      Bilateral leg edema  Assessment & Plan  Intermittently, with low albumin  Patient has normal liver contour, concern for poor absorption enterally, discussed nutrition needs with wife and patient    Iron deficiency anemia, unspecified iron deficiency anemia type- (present on admission)  Assessment & Plan  Anemia is stable, iv iron given last admission  Resume iron supplemetation      Essential hypertension- (present on admission)  Assessment & Plan  Well Controlled on Coreg, Losartan and lasix.     Chronic pain of right knee- (present on admission)  Assessment & Plan   Pain is much better controlled on MS contin + short acting oxycodone for breakthrough,   Continue lyrica  Iv pain medication after drain placement as needed    Rheumatoid arthritis (HCC)- (present on admission)  Assessment & Plan  Continue Humira as outpatient      VTE prophylaxis: none due to hematoma

## 2019-10-02 NOTE — DISCHARGE PLANNING
F/U for Rehab. IV Dilaudid/IV pain medications are barrier to IRF. WBC 14.2 today. Will monitor for pain management on oral regimen and medical readiness for IRF. VM update to d/c planner [phone].

## 2019-10-02 NOTE — THERAPY
Attempted PT treatment session this afternoon. Pt had just come back from CT guided drainage of superficial pelvic abscess. Pt declined OOB mobility or bed level therapeutic exercises at this time due to pain. Pt reports he would be willing to try tomorrow. Will complete PT treatment session as able.

## 2019-10-02 NOTE — PROGRESS NOTES
"Pharmacy Kinetics 44 y.o. male on vancomycin day # 1 10/2/2019    Currently on Vancomycin 2500 mg IV x 1 (loading dose)  Provider specified end date: 10/16/19    Indication for Treatment: Abscess left thigh    Pertinent history per medical record: Admitted on 2019 for left thigh pain s/p GLF one month ago.   CT scan revealed a subacute fracture of left superior pubic ramis and two hematomas.  Today IR performed CT guided drainage of superficial pelvic abscess. Starting empiric coverage with vancomycin and Unasyn pending culture results.       Other antibiotics: Unasyn 3 g IV q6h    Allergies: Benadryl [altaryl]; Nsaids; Phenergan [promethazine hcl]; and Penicillins     List concerns for renal function: none at this time  Pertinent cultures to date:   10/2 WCx (abscess) in process    MRSA nares swab if pneumonia is a concern (ordered/positive/negative/n-a): n/a    Recent Labs     19  0425 10/01/19  0423 10/02/19  0411   WBC 11.1* 12.4* 14.2*   NEUTSPOLYS 71.70 66.60 64.00   BANDSSTABS  --   --  2.00     Recent Labs     19  0425 10/01/19  0423 10/02/19  0411   BUN 11 10 12   CREATININE 0.63 0.60 0.62     No results for input(s): VANCOTROUGH, VANCOPEAK, VANCORANDOM in the last 72 hours.    Intake/Output Summary (Last 24 hours) at 10/2/2019 1431  Last data filed at 10/2/2019 1300  Gross per 24 hour   Intake no documentation   Output 1910 ml   Net -1910 ml      Blood Pressure 106/77   Pulse 76   Temperature 36.7 °C (98 °F) (Oral)   Respiration 16   Height 1.803 m (5' 10.98\")   Weight 99.6 kg (219 lb 9.3 oz)   Oxygen Saturation 93%  Temp (24hrs), Av.6 °C (97.8 °F), Min:36.3 °C (97.4 °F), Max:36.7 °C (98.1 °F)      A/P   1. Vancomycin dose change: 1750 mg mg IV q12h (0300,1500)  2. Next vancomycin level: 10/3 at 1430  3. Goal trough: 12-16 mcg/ml  4. Comments: Will follow levels and adjust regimen as needed    Sunil Rowell, PharmD    "

## 2019-10-02 NOTE — PROGRESS NOTES
"Pt AAOx4. Pain is controlled at this time. Denies nausea, SOB, dizziness, chills. Reports rash in groin area, redness on bilateral groin seen by this RN, but pt stated \"It is not itchy\" POC discussed w/ pt including being NPO at [place] to prepare for CT. Questions and concerns answered  Safety and comfort measures in place.  No additional needs at this time.  "

## 2019-10-02 NOTE — ASSESSMENT & PLAN NOTE
Multiple abscesses found on imaging CT/MRI  Cultures from abscess and bone have grown MSSA  IV ancef through 11/19/2019 per ID

## 2019-10-02 NOTE — OR SURGEON
Immediate Post- Operative Note        PostOp Diagnosis: superficial pelvic Abscess      Procedure(s):CT guided drainage of abscess and 8 Fr drain placement      Estimated Blood Loss: Less than 5 ml        Complications: None            10/2/2019     1:13 PM     Tom Olguin

## 2019-10-02 NOTE — CARE PLAN
Problem: Safety  Goal: Will remain free from injury  Outcome: PROGRESSING AS EXPECTED   Pt educated to call when in need. Call light and personal belongings w/n reach. Room free of clutters. Bed locked and in lowest position. Answer call light immediately.     Problem: Pain Management  Goal: Pain level will decrease to patient's comfort goal  Outcome: PROGRESSING AS EXPECTED   Patient medicated per MD orders. Encouraged the use of non-pharm measures of pain relief and taught to inform RN if pain is greater than comfort level.

## 2019-10-02 NOTE — PROGRESS NOTES
[facility] Medicine Daily Progress Note    Date of Service  10/1/2019    Chief Complaint  44 y.o. male admitted 9/27/2019 with left thigh pain    Hospital Course    44 y.o. male who presented to the emergency department on 9/27/2019 for evaluation of worsening left leg pain.  Patient had a fall about a month ago.  Patient has a problem with his right knee, on schedule for total knee replacement and ever since walking with a cane or walker. He had increasing pain in the left thigh and CT scan showed a left pubic ramus fracture with two large hematomas. Orthopedic surgery did see the patient and recommends no surgical intervention.      Interval Problem Update  Pain is better controlled but the patient is not ambulating well    Consultants/Specialty  Orthopedic surgery Dr. Olivares  Radiology Dr. Olguin    Code Status  full    Disposition  rehab    Review of Systems  Review of Systems   Constitutional: Negative for chills, diaphoresis, fever and malaise/fatigue.   HENT: Negative for congestion.    Respiratory: Negative for cough, shortness of breath, wheezing and stridor.    Cardiovascular: Negative for chest pain, palpitations and leg swelling.   Gastrointestinal: Negative for abdominal pain, constipation, diarrhea, nausea and vomiting.   Genitourinary: Negative for dysuria, frequency and urgency.   Musculoskeletal: Positive for myalgias. Negative for back pain.        Left thigh pain   Skin: Negative for itching and rash.   Neurological: Negative for dizziness, tremors, focal weakness, weakness and headaches.   Endo/Heme/Allergies: Does not bruise/bleed easily.        Physical Exam  Temp:  [36.6 °C (97.9 °F)-36.9 °C (98.4 °F)] 36.6 °C (97.9 °F)  Pulse:  [75-88] 86  Resp:  [15-18] 15  BP: ()/(61-68) 99/61  SpO2:  [97 %-100 %] 98 %    Physical Exam   Constitutional: He is oriented to person, place, and time. No distress.   HENT:   Mouth/Throat: Oropharynx is clear and moist. No oropharyngeal exudate.   Eyes:  Conjunctivae are normal. No scleral icterus.   Neck: Neck supple. No tracheal deviation present.   Cardiovascular: Normal rate, regular rhythm and intact distal pulses.   No murmur heard.  Pulmonary/Chest: Effort normal and breath sounds normal. No stridor. No respiratory distress. He has no wheezes.   Abdominal: Soft. Bowel sounds are normal. He exhibits no distension. There is no tenderness.   Musculoskeletal: He exhibits tenderness. He exhibits no edema.   Tender to touch over left medial thigh   Neurological: He is alert and oriented to person, place, and time.   Skin: Skin is warm and dry. Rash noted. He is not diaphoretic. No erythema.   Dry skin over elbows    Psychiatric: His behavior is normal. Thought content normal.   Nursing note and vitals reviewed.      Fluids    Intake/Output Summary (Last 24 hours) at 10/1/2019 1822  Last data filed at 10/1/2019 1340  Gross per 24 hour   Intake 120 ml   Output 2675 ml   Net -2555 ml       Laboratory  Recent Labs     09/29/19  0314 09/30/19  0425 10/01/19  0423   WBC 10.4 11.1* 12.4*   RBC 3.59* 3.80* 3.84*   HEMOGLOBIN 9.5* 9.9* 10.1*   HEMATOCRIT 30.3* 32.7* 33.3*   MCV 84.4 86.1 86.7   MCH 26.5* 26.1* 26.3*   MCHC 31.4* 30.3* 30.3*   RDW 64.1* 64.7* 63.3*   PLATELETCT 260 288 319   MPV 9.1 9.4 8.9*     Recent Labs     09/29/19 0314 09/30/19 0425 10/01/19  0423   SODIUM 136 137 136   POTASSIUM 3.8 4.1 5.0   CHLORIDE 101 102 100   CO2 27 26 27   GLUCOSE 87 86 73   BUN 11 11 10   CREATININE 0.69 0.63 0.60   CALCIUM 7.8* 8.1* 8.0*                   Imaging  US-RUQ   Final Result      Hepatomegaly.      Status post cholecystectomy.      Dilated portal vein.      Status post cholecystectomy.      No biliary ductal dilatation.      Limited evaluation of the pancreas which is obscured.      MR-THORACIC SPINE-W/O   Final Result      1.  No acute fracture in the thoracic spine.   2.  Mild chronic compression deformity at T11.      MR-LUMBAR SPINE-W/O   Final Result      1.   Acute bilateral sacral alae fractures.   2.  There is edema in the lower posterior paraspinal soft tissue likely representing soft tissue contusion.   3.  There is no lumbar spine fractures.         CT-LSPINE W/O PLUS RECONS   Final Result      No acute fracture or traumatic malalignment.      CT-PELVIS WITH   Final Result      1.  Recent but probably subacute fracture of the left superior pubic ramus near the symphysis. Adjacent and just lateral to this are two complex masslike structures, the largest and most medial of which contains a fluid-filled cavity. These are    consistent with hematomata, possibly intramuscular.      US-EXTREMITY VENOUS LOWER UNILAT LEFT   Final Result      1.  No evidence of Left  lower extremity deep venous thrombosis.   2.  Two complex areas within the left groin, consistent with hematomata.            INTERPRETING LOCATION: [address], [place], [address]           Assessment/Plan  * Pubic ramus fracture (HCC)  Assessment & Plan  S/p mechanical GLF  MS Continue 15mg BID continue with oxycodone,   Rehab referral placed  Continue PT/OT  Start calcium and vit D for thin bones due to chronic prednisone use for rheumatoid arthritis    Hematoma of left thigh  Assessment & Plan  Due to ground level fall,   Patient has good pulses in the left leg and skin is warm with intact sensation  Leukocytosis of unclear etiology, will recheck am labs    Bilateral leg edema  Assessment & Plan  Intermittently, with low albumin and patient has history of alcohol use and obesity remotely  Will check liver ultrasound to evaluate for fatty liver disease causing his low albumin  Edema is currently controlled    Iron deficiency anemia, unspecified iron deficiency anemia type- (present on admission)  Assessment & Plan  Anemia is stable, iv iron given last admission  Resume iron supplemetation      Essential hypertension- (present on admission)  Assessment & Plan  Well Controlled on Coreg, Losartan and lasix.      Chronic pain of right knee- (present on admission)  Assessment & Plan   Pain is much better controlled on MS contin + short acting oxycodone for breakthrough,   Continue lyrica    Rheumatoid arthritis (HCC)- (present on admission)  Assessment & Plan  Continue Humira as outpatient     discussed hematoma with radiology Dr. Sánchez and will plan for CT guided drain of hematoma tomorrow, will order npo status    VTE prophylaxis: none due to hematoma

## 2019-10-03 PROBLEM — B37.89 CANDIDA RASH OF GROIN: Status: ACTIVE | Noted: 2019-10-03

## 2019-10-03 LAB
ANION GAP SERPL CALC-SCNC: 12 MMOL/L (ref 0–11.9)
ANISOCYTOSIS BLD QL SMEAR: ABNORMAL
BASOPHILS # BLD AUTO: 0 % (ref 0–1.8)
BASOPHILS # BLD: 0 K/UL (ref 0–0.12)
BUN SERPL-MCNC: 10 MG/DL (ref 8–22)
CALCIUM SERPL-MCNC: 8 MG/DL (ref 8.4–10.2)
CHLORIDE SERPL-SCNC: 102 MMOL/L (ref 96–112)
CO2 SERPL-SCNC: 23 MMOL/L (ref 20–33)
CREAT SERPL-MCNC: 0.91 MG/DL (ref 0.5–1.4)
EOSINOPHIL # BLD AUTO: 0.37 K/UL (ref 0–0.51)
EOSINOPHIL NFR BLD: 3 % (ref 0–6.9)
ERYTHROCYTE [DISTWIDTH] IN BLOOD BY AUTOMATED COUNT: 65.6 FL (ref 35.9–50)
GLUCOSE SERPL-MCNC: 122 MG/DL (ref 65–99)
HCT VFR BLD AUTO: 32.3 % (ref 42–52)
HGB BLD-MCNC: 9.7 G/DL (ref 14–18)
LACTATE BLD-SCNC: 1.1 MMOL/L (ref 0.5–2)
LACTATE BLD-SCNC: 1.4 MMOL/L (ref 0.5–2)
LYMPHOCYTES # BLD AUTO: 2.83 K/UL (ref 1–4.8)
LYMPHOCYTES NFR BLD: 23 % (ref 22–41)
MANUAL DIFF BLD: NORMAL
MCH RBC QN AUTO: 26.5 PG (ref 27–33)
MCHC RBC AUTO-ENTMCNC: 30 G/DL (ref 33.7–35.3)
MCV RBC AUTO: 88.3 FL (ref 81.4–97.8)
MICROCYTES BLD QL SMEAR: ABNORMAL
MONOCYTES # BLD AUTO: 0.86 K/UL (ref 0–0.85)
MONOCYTES NFR BLD AUTO: 7 % (ref 0–13.4)
MYELOCYTES NFR BLD MANUAL: 1 %
NEUTROPHILS # BLD AUTO: 8.12 K/UL (ref 1.82–7.42)
NEUTROPHILS NFR BLD: 64 % (ref 44–72)
NEUTS BAND NFR BLD MANUAL: 2 % (ref 0–10)
NRBC # BLD AUTO: 0 K/UL
NRBC BLD-RTO: 0 /100 WBC
PLATELET # BLD AUTO: 345 K/UL (ref 164–446)
PLATELET BLD QL SMEAR: NORMAL
PMV BLD AUTO: 8.7 FL (ref 9–12.9)
POLYCHROMASIA BLD QL SMEAR: NORMAL
POTASSIUM SERPL-SCNC: 4.1 MMOL/L (ref 3.6–5.5)
RBC # BLD AUTO: 3.66 M/UL (ref 4.7–6.1)
RBC BLD AUTO: PRESENT
SODIUM SERPL-SCNC: 137 MMOL/L (ref 135–145)
WBC # BLD AUTO: 12.3 K/UL (ref 4.8–10.8)

## 2019-10-03 PROCEDURE — A9270 NON-COVERED ITEM OR SERVICE: HCPCS | Performed by: HOSPITALIST

## 2019-10-03 PROCEDURE — 85007 BL SMEAR W/DIFF WBC COUNT: CPT

## 2019-10-03 PROCEDURE — 770001 HCHG ROOM/CARE - MED/SURG/GYN PRIV*

## 2019-10-03 PROCEDURE — 99233 SBSQ HOSP IP/OBS HIGH 50: CPT | Performed by: INTERNAL MEDICINE

## 2019-10-03 PROCEDURE — 700102 HCHG RX REV CODE 250 W/ 637 OVERRIDE(OP): Performed by: HOSPITALIST

## 2019-10-03 PROCEDURE — 97535 SELF CARE MNGMENT TRAINING: CPT

## 2019-10-03 PROCEDURE — 96366 THER/PROPH/DIAG IV INF ADDON: CPT

## 2019-10-03 PROCEDURE — 97530 THERAPEUTIC ACTIVITIES: CPT

## 2019-10-03 PROCEDURE — 700102 HCHG RX REV CODE 250 W/ 637 OVERRIDE(OP): Performed by: INTERNAL MEDICINE

## 2019-10-03 PROCEDURE — A9270 NON-COVERED ITEM OR SERVICE: HCPCS | Performed by: INTERNAL MEDICINE

## 2019-10-03 PROCEDURE — 97116 GAIT TRAINING THERAPY: CPT

## 2019-10-03 PROCEDURE — 85027 COMPLETE CBC AUTOMATED: CPT

## 2019-10-03 PROCEDURE — 83605 ASSAY OF LACTIC ACID: CPT | Mod: 91

## 2019-10-03 PROCEDURE — 80048 BASIC METABOLIC PNL TOTAL CA: CPT

## 2019-10-03 PROCEDURE — 700111 HCHG RX REV CODE 636 W/ 250 OVERRIDE (IP): Performed by: INTERNAL MEDICINE

## 2019-10-03 PROCEDURE — 700105 HCHG RX REV CODE 258: Performed by: INTERNAL MEDICINE

## 2019-10-03 PROCEDURE — 97110 THERAPEUTIC EXERCISES: CPT

## 2019-10-03 PROCEDURE — 36415 COLL VENOUS BLD VENIPUNCTURE: CPT

## 2019-10-03 PROCEDURE — 96376 TX/PRO/DX INJ SAME DRUG ADON: CPT

## 2019-10-03 PROCEDURE — 700111 HCHG RX REV CODE 636 W/ 250 OVERRIDE (IP): Performed by: HOSPITALIST

## 2019-10-03 RX ORDER — FLUCONAZOLE 200 MG/1
200 TABLET ORAL ONCE
Status: COMPLETED | OUTPATIENT
Start: 2019-10-03 | End: 2019-10-03

## 2019-10-03 RX ORDER — FLUCONAZOLE 200 MG/1
400 TABLET ORAL DAILY
Status: COMPLETED | OUTPATIENT
Start: 2019-10-04 | End: 2019-10-10

## 2019-10-03 RX ADMIN — MORPHINE SULFATE 4 MG: 4 INJECTION INTRAVENOUS at 18:22

## 2019-10-03 RX ADMIN — FUROSEMIDE 20 MG: 40 TABLET ORAL at 17:25

## 2019-10-03 RX ADMIN — NICOTINE 14 MG: 14 PATCH, EXTENDED RELEASE TRANSDERMAL at 05:42

## 2019-10-03 RX ADMIN — FUROSEMIDE 20 MG: 40 TABLET ORAL at 05:41

## 2019-10-03 RX ADMIN — MORPHINE SULFATE 4 MG: 4 INJECTION INTRAVENOUS at 04:28

## 2019-10-03 RX ADMIN — AMPICILLIN AND SULBACTAM 3 G: 1; 2 INJECTION, POWDER, FOR SOLUTION INTRAMUSCULAR; INTRAVENOUS at 23:08

## 2019-10-03 RX ADMIN — FERROUS SULFATE TAB 325 MG (65 MG ELEMENTAL FE) 325 MG: 325 (65 FE) TAB at 05:42

## 2019-10-03 RX ADMIN — FLUCONAZOLE 200 MG: 200 TABLET ORAL at 14:07

## 2019-10-03 RX ADMIN — AMPICILLIN AND SULBACTAM 3 G: 1; 2 INJECTION, POWDER, FOR SOLUTION INTRAMUSCULAR; INTRAVENOUS at 11:06

## 2019-10-03 RX ADMIN — PREGABALIN 25 MG: 25 CAPSULE ORAL at 17:12

## 2019-10-03 RX ADMIN — AMPICILLIN AND SULBACTAM 3 G: 1; 2 INJECTION, POWDER, FOR SOLUTION INTRAMUSCULAR; INTRAVENOUS at 06:23

## 2019-10-03 RX ADMIN — OMEPRAZOLE 40 MG: 20 CAPSULE, DELAYED RELEASE ORAL at 05:42

## 2019-10-03 RX ADMIN — MORPHINE SULFATE 4 MG: 4 INJECTION INTRAVENOUS at 14:07

## 2019-10-03 RX ADMIN — AMPICILLIN AND SULBACTAM 3 G: 1; 2 INJECTION, POWDER, FOR SOLUTION INTRAMUSCULAR; INTRAVENOUS at 17:11

## 2019-10-03 RX ADMIN — MORPHINE SULFATE 4 MG: 4 INJECTION INTRAVENOUS at 23:15

## 2019-10-03 RX ADMIN — OMEPRAZOLE 40 MG: 20 CAPSULE, DELAYED RELEASE ORAL at 17:12

## 2019-10-03 RX ADMIN — MICONAZOLE NITRATE: 20 CREAM TOPICAL at 17:11

## 2019-10-03 RX ADMIN — FLUCONAZOLE 200 MG: 200 TABLET ORAL at 05:41

## 2019-10-03 RX ADMIN — MORPHINE SULFATE 30 MG: 30 TABLET, EXTENDED RELEASE ORAL at 05:41

## 2019-10-03 RX ADMIN — MORPHINE SULFATE 30 MG: 30 TABLET, EXTENDED RELEASE ORAL at 17:12

## 2019-10-03 RX ADMIN — PREGABALIN 25 MG: 25 CAPSULE ORAL at 11:06

## 2019-10-03 RX ADMIN — MORPHINE SULFATE 4 MG: 4 INJECTION INTRAVENOUS at 10:05

## 2019-10-03 RX ADMIN — MICONAZOLE NITRATE: 20 CREAM TOPICAL at 05:44

## 2019-10-03 RX ADMIN — VANCOMYCIN HYDROCHLORIDE 1750 MG: 500 INJECTION, POWDER, LYOPHILIZED, FOR SOLUTION INTRAVENOUS at 03:49

## 2019-10-03 RX ADMIN — VITAMIN D, TAB 1000IU (100/BT) 1000 UNITS: 25 TAB at 05:41

## 2019-10-03 RX ADMIN — CALCIUM CITRATE 200 MG (950 MG) TABLET 950 MG: at 05:41

## 2019-10-03 RX ADMIN — PREGABALIN 25 MG: 25 CAPSULE ORAL at 05:42

## 2019-10-03 ASSESSMENT — ENCOUNTER SYMPTOMS
HEADACHES: 0
HEARTBURN: 0
DIZZINESS: 0
MYALGIAS: 1
SORE THROAT: 0
CHILLS: 0
FEVER: 0
DIARRHEA: 0
STRIDOR: 0
SHORTNESS OF BREATH: 0
WEAKNESS: 0
COUGH: 0
ORTHOPNEA: 0
NAUSEA: 0
ABDOMINAL PAIN: 0
DIAPHORESIS: 1
BRUISES/BLEEDS EASILY: 0
CONSTIPATION: 0
BACK PAIN: 0

## 2019-10-03 ASSESSMENT — COGNITIVE AND FUNCTIONAL STATUS - GENERAL
HELP NEEDED FOR BATHING: A LOT
TURNING FROM BACK TO SIDE WHILE IN FLAT BAD: A LITTLE
TOILETING: A LOT
DRESSING REGULAR LOWER BODY CLOTHING: A LOT
STANDING UP FROM CHAIR USING ARMS: A LITTLE
DRESSING REGULAR UPPER BODY CLOTHING: A LITTLE
EATING MEALS: A LITTLE
MOVING TO AND FROM BED TO CHAIR: A LITTLE
SUGGESTED CMS G CODE MODIFIER DAILY ACTIVITY: CK
MOBILITY SCORE: 16
SUGGESTED CMS G CODE MODIFIER MOBILITY: CK
WALKING IN HOSPITAL ROOM: A LITTLE
MOVING FROM LYING ON BACK TO SITTING ON SIDE OF FLAT BED: A LITTLE
CLIMB 3 TO 5 STEPS WITH RAILING: TOTAL
DAILY ACTIVITIY SCORE: 15
PERSONAL GROOMING: A LITTLE

## 2019-10-03 ASSESSMENT — PATIENT HEALTH QUESTIONNAIRE - PHQ9
2. FEELING DOWN, DEPRESSED, IRRITABLE, OR HOPELESS: NOT AT ALL
SUM OF ALL RESPONSES TO PHQ9 QUESTIONS 1 AND 2: 0
1. LITTLE INTEREST OR PLEASURE IN DOING THINGS: NOT AT ALL

## 2019-10-03 ASSESSMENT — GAIT ASSESSMENTS
ASSISTIVE DEVICE: FRONT WHEEL WALKER
DISTANCE (FEET): 18
GAIT LEVEL OF ASSIST: MINIMAL ASSIST
DEVIATION: ANTALGIC

## 2019-10-03 NOTE — THERAPY
"Physical Therapy Treatment completed.   Bed Mobility:  Supine to Sit: Supervised  Transfers: Sit to Stand: Minimal Assist  Gait: Level Of Assist: Minimal Assist with Front-Wheel Walker       Plan of Care: Will benefit from Physical Therapy 7 times per week  Discharge Recommendations: Equipment: Front-Wheel Walker. Recommend post-acute placement for continued physical therapy services prior to discharge home. Patient can tolerate post-acute therapies at a 5x/week frequency.       See \"Rehab Therapy-Acute\" Patient Summary Report for complete documentation.     Pt is progressing slower than expected and continues to demonstrate impaired gait, pain, weakness, and dec ROM in LLE. Pt provided with supine ther-ex to improve ROM and strength in LLE. Pt educated on avoiding to keep pillows under knee and promote extension of LLE. Pt was able to demonstrate Min A for all functional mobility and tolerated 18ft of ambulation within his room. Pt demonstrates heavy reliance on FWW and is unable to demonstrate functional gait mechanics at this time. Pt will continue to benefit from skilled PT while in house, with recommendation for post acute therpay prior to d/c home given current objective findings, age, IPLOF, and limtied social support.   "

## 2019-10-03 NOTE — DISCHARGE PLANNING
Anticipate an admission once medically cleared. TCC remains monitoring. IV analgesics may be a barrier to [facility].  Please utilize PO analgesics if appropriate.

## 2019-10-03 NOTE — THERAPY
"Occupational Therapy Treatment completed with focus on ADLs and ADL transfers.  Functional Status:  Pt agreeable to therapy today- had groin abscess drained yesterday with a gian drain in currently.  Pt able to actively move LLE today in bed, and able to get up to EOB sitting with supervision (with HOB up).   EOB, Miina to change gown, complete partial sponge bath seated.  Lisa sit to stand from EOB, Lisa and slow pace with FWW to walk to sink.  Pt able to stand for 6-7 min at sink for grooming, but needed to prop arms on sink for stability due to pain WB thru LLE. Walked from sink back to bedside chair with Lisa and FWW, needed several standing rest breaks due to pain.  Seated up in chair at conclusion of therapy. At this time, pt moving slightly better, but still significantly impaired in his activity tolerance due to pain.  Highly motivated, wants to be indep and safe with ADL's as soon as possible.  Would greatly benefit from further inpt post acute therapy prior to home.  Will follow while in house.  Plan of Care: Will benefit from Occupational Therapy 3 times per week  Discharge Recommendations:  Equipment Will Continue to Assess for Equipment Needs. Post-acute therapy Discharge to a transitional care facility for continued skilled therapy services.    See \"Rehab Therapy-Acute\" Patient Summary Report for complete documentation.   "

## 2019-10-03 NOTE — CARE PLAN
Problem: Communication  Goal: The ability to communicate needs accurately and effectively will improve  Outcome: PROGRESSING AS EXPECTED  Note:   Pt is a/ox4 and can express needs, concerns and pain/comfort level to staff when prompted.  Pt demonstrated proper call bell use and agrees to call for assistance when needed.      Problem: Safety  Goal: Will remain free from injury  Outcome: PROGRESSING AS EXPECTED  Note:   Frequent rounding performed to ensure safety. During rounds skin, IV sites and wounds assessed for breakdown or complications.  Call bell and personal items within reach.  Room is well lit and clutter free.    Goal: Will remain free from falls  Outcome: PROGRESSING AS EXPECTED  Note:   Fall precautions in place.     Problem: Infection  Goal: Will remain free from infection  Outcome: PROGRESSING AS EXPECTED  Note:   Pt afebrile and treating with IV anbx.  Labs progressing with treatment.  Drain site shows no s/s of infection upon exam.     Problem: Knowledge Deficit  Goal: Knowledge of disease process/condition, treatment plan, diagnostic tests, and medications will improve  Outcome: PROGRESSING AS EXPECTED  Note:   Educated pt on the importance of finishing prescribed anbx even if s/s improve, diminish or resolve.  Education is made easier with pt's spouse being a nurse practitioner.  RN answered all questions to pt's satisfaction.  RN available for any questions or further education during shift.     Problem: Pain Management  Goal: Pain level will decrease to patient's comfort goal  Outcome: PROGRESSING AS EXPECTED  Note:   Pain being managed with prescribed prn medications

## 2019-10-04 LAB
ANION GAP SERPL CALC-SCNC: 9 MMOL/L (ref 0–11.9)
ANISOCYTOSIS BLD QL SMEAR: ABNORMAL
BACTERIA WND AEROBE CULT: ABNORMAL
BACTERIA WND AEROBE CULT: ABNORMAL
BASOPHILS # BLD AUTO: 1 % (ref 0–1.8)
BASOPHILS # BLD: 0.13 K/UL (ref 0–0.12)
BUN SERPL-MCNC: 11 MG/DL (ref 8–22)
CALCIUM SERPL-MCNC: 8.5 MG/DL (ref 8.4–10.2)
CHLORIDE SERPL-SCNC: 99 MMOL/L (ref 96–112)
CO2 SERPL-SCNC: 28 MMOL/L (ref 20–33)
CREAT SERPL-MCNC: 0.79 MG/DL (ref 0.5–1.4)
EOSINOPHIL # BLD AUTO: 0 K/UL (ref 0–0.51)
EOSINOPHIL NFR BLD: 0 % (ref 0–6.9)
ERYTHROCYTE [DISTWIDTH] IN BLOOD BY AUTOMATED COUNT: 63.9 FL (ref 35.9–50)
GLUCOSE SERPL-MCNC: 90 MG/DL (ref 65–99)
GRAM STN SPEC: ABNORMAL
HCT VFR BLD AUTO: 32.8 % (ref 42–52)
HGB BLD-MCNC: 9.9 G/DL (ref 14–18)
LYMPHOCYTES # BLD AUTO: 3.35 K/UL (ref 1–4.8)
LYMPHOCYTES NFR BLD: 26 % (ref 22–41)
MANUAL DIFF BLD: NORMAL
MCH RBC QN AUTO: 26.1 PG (ref 27–33)
MCHC RBC AUTO-ENTMCNC: 30.2 G/DL (ref 33.7–35.3)
MCV RBC AUTO: 86.5 FL (ref 81.4–97.8)
METAMYELOCYTES NFR BLD MANUAL: 2 %
MICROCYTES BLD QL SMEAR: ABNORMAL
MONOCYTES # BLD AUTO: 1.03 K/UL (ref 0–0.85)
MONOCYTES NFR BLD AUTO: 8 % (ref 0–13.4)
NEUTROPHILS # BLD AUTO: 8.13 K/UL (ref 1.82–7.42)
NEUTROPHILS NFR BLD: 63 % (ref 44–72)
NRBC # BLD AUTO: 0 K/UL
NRBC BLD-RTO: 0 /100 WBC
PLATELET # BLD AUTO: 337 K/UL (ref 164–446)
PLATELET BLD QL SMEAR: NORMAL
PMV BLD AUTO: 8.8 FL (ref 9–12.9)
POLYCHROMASIA BLD QL SMEAR: NORMAL
POTASSIUM SERPL-SCNC: 4.5 MMOL/L (ref 3.6–5.5)
RBC # BLD AUTO: 3.79 M/UL (ref 4.7–6.1)
RBC BLD AUTO: PRESENT
SIGNIFICANT IND 70042: ABNORMAL
SITE SITE: ABNORMAL
SODIUM SERPL-SCNC: 136 MMOL/L (ref 135–145)
SOURCE SOURCE: ABNORMAL
WBC # BLD AUTO: 12.9 K/UL (ref 4.8–10.8)

## 2019-10-04 PROCEDURE — 36415 COLL VENOUS BLD VENIPUNCTURE: CPT

## 2019-10-04 PROCEDURE — 97110 THERAPEUTIC EXERCISES: CPT

## 2019-10-04 PROCEDURE — 80048 BASIC METABOLIC PNL TOTAL CA: CPT

## 2019-10-04 PROCEDURE — 700102 HCHG RX REV CODE 250 W/ 637 OVERRIDE(OP): Performed by: INTERNAL MEDICINE

## 2019-10-04 PROCEDURE — 770001 HCHG ROOM/CARE - MED/SURG/GYN PRIV*

## 2019-10-04 PROCEDURE — A9270 NON-COVERED ITEM OR SERVICE: HCPCS | Performed by: HOSPITALIST

## 2019-10-04 PROCEDURE — A9270 NON-COVERED ITEM OR SERVICE: HCPCS | Performed by: INTERNAL MEDICINE

## 2019-10-04 PROCEDURE — 700111 HCHG RX REV CODE 636 W/ 250 OVERRIDE (IP): Performed by: INTERNAL MEDICINE

## 2019-10-04 PROCEDURE — 85027 COMPLETE CBC AUTOMATED: CPT

## 2019-10-04 PROCEDURE — 700102 HCHG RX REV CODE 250 W/ 637 OVERRIDE(OP): Performed by: HOSPITALIST

## 2019-10-04 PROCEDURE — 700111 HCHG RX REV CODE 636 W/ 250 OVERRIDE (IP): Performed by: HOSPITALIST

## 2019-10-04 PROCEDURE — 85007 BL SMEAR W/DIFF WBC COUNT: CPT

## 2019-10-04 PROCEDURE — 700105 HCHG RX REV CODE 258: Performed by: INTERNAL MEDICINE

## 2019-10-04 PROCEDURE — 99232 SBSQ HOSP IP/OBS MODERATE 35: CPT | Performed by: INTERNAL MEDICINE

## 2019-10-04 PROCEDURE — 97116 GAIT TRAINING THERAPY: CPT

## 2019-10-04 RX ADMIN — MORPHINE SULFATE 4 MG: 4 INJECTION INTRAVENOUS at 05:28

## 2019-10-04 RX ADMIN — OMEPRAZOLE 40 MG: 20 CAPSULE, DELAYED RELEASE ORAL at 18:17

## 2019-10-04 RX ADMIN — VITAMIN D, TAB 1000IU (100/BT) 1000 UNITS: 25 TAB at 05:17

## 2019-10-04 RX ADMIN — OMEPRAZOLE 40 MG: 20 CAPSULE, DELAYED RELEASE ORAL at 05:05

## 2019-10-04 RX ADMIN — AMPICILLIN AND SULBACTAM 3 G: 1; 2 INJECTION, POWDER, FOR SOLUTION INTRAMUSCULAR; INTRAVENOUS at 05:07

## 2019-10-04 RX ADMIN — NICOTINE 14 MG: 14 PATCH, EXTENDED RELEASE TRANSDERMAL at 05:09

## 2019-10-04 RX ADMIN — MICONAZOLE NITRATE: 20 CREAM TOPICAL at 18:16

## 2019-10-04 RX ADMIN — SENNOSIDES, DOCUSATE SODIUM 2 TABLET: 50; 8.6 TABLET, FILM COATED ORAL at 18:26

## 2019-10-04 RX ADMIN — PREGABALIN 25 MG: 25 CAPSULE ORAL at 18:18

## 2019-10-04 RX ADMIN — CALCIUM CITRATE 200 MG (950 MG) TABLET 950 MG: at 05:07

## 2019-10-04 RX ADMIN — MORPHINE SULFATE 30 MG: 30 TABLET, EXTENDED RELEASE ORAL at 05:06

## 2019-10-04 RX ADMIN — LOSARTAN POTASSIUM 25 MG: 25 TABLET, FILM COATED ORAL at 05:07

## 2019-10-04 RX ADMIN — AMPICILLIN AND SULBACTAM 3 G: 1; 2 INJECTION, POWDER, FOR SOLUTION INTRAMUSCULAR; INTRAVENOUS at 18:17

## 2019-10-04 RX ADMIN — PREGABALIN 25 MG: 25 CAPSULE ORAL at 11:01

## 2019-10-04 RX ADMIN — MICONAZOLE NITRATE: 20 CREAM TOPICAL at 05:18

## 2019-10-04 RX ADMIN — FUROSEMIDE 20 MG: 40 TABLET ORAL at 05:10

## 2019-10-04 RX ADMIN — MORPHINE SULFATE 30 MG: 30 TABLET, EXTENDED RELEASE ORAL at 18:18

## 2019-10-04 RX ADMIN — FERROUS SULFATE TAB 325 MG (65 MG ELEMENTAL FE) 325 MG: 325 (65 FE) TAB at 05:07

## 2019-10-04 RX ADMIN — MORPHINE SULFATE 4 MG: 4 INJECTION INTRAVENOUS at 20:17

## 2019-10-04 RX ADMIN — SENNOSIDES, DOCUSATE SODIUM 2 TABLET: 50; 8.6 TABLET, FILM COATED ORAL at 05:17

## 2019-10-04 RX ADMIN — MORPHINE SULFATE 4 MG: 4 INJECTION INTRAVENOUS at 10:57

## 2019-10-04 RX ADMIN — AMPICILLIN AND SULBACTAM 3 G: 1; 2 INJECTION, POWDER, FOR SOLUTION INTRAMUSCULAR; INTRAVENOUS at 11:00

## 2019-10-04 RX ADMIN — ENOXAPARIN SODIUM 40 MG: 100 INJECTION SUBCUTANEOUS at 05:11

## 2019-10-04 RX ADMIN — PREGABALIN 25 MG: 25 CAPSULE ORAL at 05:06

## 2019-10-04 RX ADMIN — FLUCONAZOLE 400 MG: 200 TABLET ORAL at 05:06

## 2019-10-04 RX ADMIN — MORPHINE SULFATE 4 MG: 4 INJECTION INTRAVENOUS at 14:45

## 2019-10-04 ASSESSMENT — ENCOUNTER SYMPTOMS
SHORTNESS OF BREATH: 0
COUGH: 0
NAUSEA: 0
ORTHOPNEA: 0
BACK PAIN: 0
WEAKNESS: 0
SPUTUM PRODUCTION: 0
CONSTIPATION: 0
DIARRHEA: 0
HEARTBURN: 0
STRIDOR: 0
MYALGIAS: 1
FEVER: 0
DIZZINESS: 0
ABDOMINAL PAIN: 0
DIAPHORESIS: 0

## 2019-10-04 ASSESSMENT — COGNITIVE AND FUNCTIONAL STATUS - GENERAL
STANDING UP FROM CHAIR USING ARMS: A LITTLE
TURNING FROM BACK TO SIDE WHILE IN FLAT BAD: A LITTLE
MOBILITY SCORE: 16
CLIMB 3 TO 5 STEPS WITH RAILING: TOTAL
MOVING FROM LYING ON BACK TO SITTING ON SIDE OF FLAT BED: A LITTLE
SUGGESTED CMS G CODE MODIFIER MOBILITY: CK
WALKING IN HOSPITAL ROOM: A LITTLE
MOVING TO AND FROM BED TO CHAIR: A LITTLE

## 2019-10-04 ASSESSMENT — GAIT ASSESSMENTS
ASSISTIVE DEVICE: FRONT WHEEL WALKER
DEVIATION: ANTALGIC
DISTANCE (FEET): 25
GAIT LEVEL OF ASSIST: MINIMAL ASSIST

## 2019-10-04 NOTE — PROGRESS NOTES
[facility] Medicine Daily Progress Note    Date of Service  10/4/2019    Chief Complaint  44 y.o. male admitted 9/27/2019 with left thigh pain    Hospital Course    44 y.o. male who presented to the emergency department on 9/27/2019 for evaluation of worsening left leg pain.  Patient had a fall about a month ago.  Patient has a problem with his right knee, on schedule for total knee replacement and ever since walking with a cane or walker. He had increasing pain in the left thigh and CT scan showed a left pubic ramus fracture with two large hematomas. Orthopedic surgery did see the patient and recommends no surgical intervention.  The patient had a rising white cell count and increasing pain so hematoma drainage was done by interventional radiology and tiffanie pus was drained consistent with an abscess. Cultures grew MSSA and drain was left in place.      Interval Problem Update  The patient has slow improvement in his pain and mobility with therapy    Consultants/Specialty  Orthopedic surgery Dr. Olivares  Radiology Dr. Olguin    Code Status  full    Disposition  rehab    Review of Systems  Review of Systems   Constitutional: Positive for malaise/fatigue. Negative for diaphoresis and fever.        Night sweats   HENT: Negative for congestion.    Respiratory: Negative for cough, sputum production, shortness of breath and stridor.    Cardiovascular: Negative for chest pain, orthopnea and leg swelling.   Gastrointestinal: Negative for abdominal pain, constipation, diarrhea, heartburn and nausea.   Genitourinary: Negative for dysuria, frequency and urgency.   Musculoskeletal: Positive for myalgias. Negative for back pain.        Left thigh pain improving but slowly   Skin: Positive for rash. Negative for itching.        Rash over thighs improving today   Neurological: Negative for dizziness and weakness.        Physical Exam  Temp:  [36.4 °C (97.5 °F)-36.8 °C (98.2 °F)] 36.4 °C (97.5 °F)  Pulse:  [76-99] 99  Resp:   [16-18] 18  BP: (100-116)/(60-76) 100/60  SpO2:  [97 %-100 %] 98 %    Physical Exam   Constitutional: He is oriented to person, place, and time. No distress.   HENT:   Mouth/Throat: Oropharynx is clear and moist. No oropharyngeal exudate.   Eyes: Pupils are equal, round, and reactive to light. EOM are normal.   Neck: Neck supple. No tracheal deviation present.   Cardiovascular: Normal rate, regular rhythm and intact distal pulses.   No murmur heard.  Pulmonary/Chest: Effort normal. No stridor. No respiratory distress. He has no wheezes. He has no rales.   Abdominal: Soft. Bowel sounds are normal. He exhibits no distension. There is no tenderness.   Musculoskeletal: He exhibits tenderness. He exhibits no edema.   Tender to touch over left medial thigh  Drain in left thigh   Neurological: He is alert and oriented to person, place, and time. Coordination abnormal.   Skin: Skin is warm and dry. Rash noted. There is erythema.   Candida rash less red and smaller in size   Psychiatric: His behavior is normal. Thought content normal.   Nursing note and vitals reviewed.      Fluids    Intake/Output Summary (Last 24 hours) at 10/4/2019 1609  Last data filed at 10/4/2019 1500  Gross per 24 hour   Intake 3205 ml   Output 4715 ml   Net -1510 ml       Laboratory  Recent Labs     10/02/19  0411 10/03/19  0408 10/04/19  0417   WBC 14.2* 12.3* 12.9*   RBC 3.71* 3.66* 3.79*   HEMOGLOBIN 9.8* 9.7* 9.9*   HEMATOCRIT 32.0* 32.3* 32.8*   MCV 86.3 88.3 86.5   MCH 26.4* 26.5* 26.1*   MCHC 30.6* 30.0* 30.2*   RDW 63.4* 65.6* 63.9*   PLATELETCT 352 345 337   MPV 8.8* 8.7* 8.8*     Recent Labs     10/02/19  0411 10/03/19  0408 10/04/19  0417   SODIUM 133* 137 136   POTASSIUM 4.6 4.1 4.5   CHLORIDE 99 102 99   CO2 23 23 28   GLUCOSE 88 122* 90   BUN 12 10 11   CREATININE 0.62 0.91 0.79   CALCIUM 8.0* 8.0* 8.5     Recent Labs     10/02/19  0859   INR 1.03               Imaging  CT-DRAIN-HEMATOMA - SEROMA   Final Result      1.  Left inguinal  region muscular abscess.   2.  CT-guided drainage and catheter placement of large collection in the left obturator externus.   3.  CT-guided aspiration of smaller collection in the left pectineus muscle.      US-RUQ   Final Result      Hepatomegaly.      Status post cholecystectomy.      Dilated portal vein.      Status post cholecystectomy.      No biliary ductal dilatation.      Limited evaluation of the pancreas which is obscured.      MR-THORACIC SPINE-W/O   Final Result      1.  No acute fracture in the thoracic spine.   2.  Mild chronic compression deformity at T11.      MR-LUMBAR SPINE-W/O   Final Result      1.  Acute bilateral sacral alae fractures.   2.  There is edema in the lower posterior paraspinal soft tissue likely representing soft tissue contusion.   3.  There is no lumbar spine fractures.         CT-LSPINE W/O PLUS RECONS   Final Result      No acute fracture or traumatic malalignment.      CT-PELVIS WITH   Final Result      1.  Recent but probably subacute fracture of the left superior pubic ramus near the symphysis. Adjacent and just lateral to this are two complex masslike structures, the largest and most medial of which contains a fluid-filled cavity. These are    consistent with hematomata, possibly intramuscular.      US-EXTREMITY VENOUS LOWER UNILAT LEFT   Final Result      1.  No evidence of Left  lower extremity deep venous thrombosis.   2.  Two complex areas within the left groin, consistent with hematomata.            INTERPRETING LOCATION: [address] [facility] [address] [facility] [address] [address] [facility], [place], [address]           Assessment/Plan  * Pubic ramus fracture (HCC)  Assessment & Plan  S/p mechanical GLF  MS Continue 15mg BID continue with oxycodone, patient  Still needing iv morphine for thigh pain from infection  Rehab referral placed, will plan for transfer once pain controlled with only oral medication  Continue PT/OT  calcium and vit D for thin bones due to chronic prednisone use for rheumatoid arthritis    Candida rash of  groin  Assessment & Plan  Inproving, continue diflucan dose at 400mg daily and topical antifungal treatment    Sepsis (HCC)  Assessment & Plan  This is Sepsis Not present on admission  SIRS criteria identified on my evaluation include: Tachycardia, with heart rate greater than 90 BPM and Leukocyosis, with WBC greater than 12,000  Source is left thigh abscess  Monitor leukocytosis          Abscess of left thigh  Assessment & Plan  Culture with MSSA and gram negative growth, will stop vancomycin and continue unasyn  Drain in place will monitor output, 45mL out yesterday  Other fluid collection is small not needing drainage per Dr. Olguin, will treat with antibiotics    Hematoma of left thigh  Assessment & Plan  Due to ground level fall,   Now with abscess, drain in place      Bilateral leg edema  Assessment & Plan  Intermittently, with low albumin  Patient has normal liver contour, concern for poor absorption enterally, discussed nutrition needs with wife and patient    Iron deficiency anemia, unspecified iron deficiency anemia type- (present on admission)  Assessment & Plan  Anemia is stable, iv iron given last admission  No acute bleed      Essential hypertension- (present on admission)  Assessment & Plan  Well Controlled on Coreg, Losartan and lasix.     Chronic pain of right knee- (present on admission)  Assessment & Plan   Pain is much better controlled on MS contin + short acting oxycodone for breakthrough,   Continue lyrica  Iv pain medication needed for pain control from left leg abscess, will continue and taper as tolerated    Rheumatoid arthritis (HCC)- (present on admission)  Assessment & Plan  Continue Humira as outpatient once infection is cleared      VTE prophylaxis: lovenox

## 2019-10-04 NOTE — CARE PLAN
"RE: INFECTION  +SEPSIS R/T LEFT GROIN ABSCESS, BRAXTON X 1 PATENT AND INTACT; LABS/V/S CLOSELY MONITORED  RE: D/C PLANNING  CONSULT PLACED FOR REHAB, PT/OT  RE: PAIN MANAGEMENT  PAIN 6/10 WITH PRN MORPHINE  CONT. MONITOR AND MEDICATE AS NEEDED  RE: MOBILITY  PER PATIENT'S REPORT, \"I NEED KNEE REPLACEMENTS\" BOTH KNEES SWOLLEN WITH LIMITED MOBILITY.  PATIENT POSITIONED WITH EXTRA PILLOWS AND ASSISTED AS NEEDED WITH ADLS  "

## 2019-10-04 NOTE — CARE PLAN
Pinpoint tenderness to Lower Left to supra pubic area no distention nor hardness palpated,denies fever/chills.BRAXTON putting out scant milky drainage, abd folds and groin cleanses with anti fungal paste and interdry placed, enc socks off pt while in chair or bed to allow to aerate, anti fugal also applied to both soles of feet.

## 2019-10-04 NOTE — DISCHARGE PLANNING
Per Dr. Correa: At this time the patient is not a good candidate for an acute inpatient rehabilitation program as he continues on IV morphine for pain control and  would not be able to tolerate 3 hours of daily therapy.  Recommend discharge to skilled nursing facility for his rehabilitation.

## 2019-10-04 NOTE — THERAPY
"Physical Therapy Treatment Note    Transfers: Sit to Stand: Minimal Assist  Gait: Level Of Assist: Minimal Assist with Front-Wheel Walker x25ft      Plan of Care: Will benefit from Physical Therapy 5 times per week  Discharge Recommendations: Recommend post-acute placement for continued physical therapy services prior to discharge home. Patient can tolerate post-acute therapies at a 5x/week frequency.       pt in chair on presentation and requesting to stay there; very pleasant and agreeable to PT. Highly motivated with a good spirit considering the circumstances. He has a significant leg length descrepancy with the RLE being shorter than the L creating L knee flexion and R knee extension in standing. Pt relies heavily on his arms during gait 2/2 pain in his legs but as a result requires rest breaks due to c/o his arms becoming fatigued. He did not have any LOB and was able to negotiate obstacles, but requires extra time for a slow gait and rests. Concluded session with LAQ which was painful and pt then began to compensate. Provided assist and pt's form improved with a more efficient use of his quadricep muscle. He reports he has been compliant with the bed ex's that were shown to him yesterday.     Dede Low, PT    See \"Rehab Therapy-Acute\" Patient Summary Report for complete documentation.       "

## 2019-10-04 NOTE — CONSULTS
Medical chart review completed.     Patient is a 44 y.o. male  with a past medical history of aortic root dilation and moderate aortic stenosis, PUD, rheumatoid arthritis, admitted to [facility] on 9/13, with anemia. He was scheduled for a total knee replacement on the right prior to this admission. He had CT here which showed left pubic ramus fracture and two large hematomas. 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 MRI spine with mild chronic compression fracture of T11 and paraspinal soft tissue contusions on the lumbar spine. He is WBAT for his pelvic fracture, managed non-operatively. He has a candidal rash on his groin/trunk.     Patient with multiple co-morbidities(including but not limited to leukocytosis, anemia, rheumatoid arthritis, hypertension); with functional deficits in mobility/self-cares, and Severe de-conditioning.     Pre-morbidly, this patient lived in a single level home with One steps to enter, with spouse. The patient was evaluated by acute care Physical Therapy and Occupational Therapy.    Therapy notes indicate his tolerance to therapy is pain limited. He continues on IV Morphine for pain control.    6 clicks score 15 mobility, 16 ADLs    At this time the patient is not a good candidate for an acute inpatient rehabilitation program as he continues on IV morphine for pain control and  would not be able to tolerate 3 hours of daily therapy.    Recommend discharge to skilled nursing facility for his rehabilitation.    Thank you for allowing us to participate in him care.    Zoey Correa M.D.  Physical Medicine and Rehabilitation

## 2019-10-04 NOTE — PROGRESS NOTES
"RE: BEDSIDE REPORT  REPORT ACCEPTED AND CARE ASSUMED    PLAN OF CARE REVIEWED WITH PATIENT; PATIENT VERBALIZES UNDERSTANDING AND RATES PAIN 7/10 STATING, \"A 7 IS GOOD FOR ME, IT WA 8 OR 9.\"  "

## 2019-10-04 NOTE — PROGRESS NOTES
[facility] Medicine Daily Progress Note    Date of Service  10/3/2019    Chief Complaint  44 y.o. male admitted 9/27/2019 with left thigh pain    Hospital Course    44 y.o. male who presented to the emergency department on 9/27/2019 for evaluation of worsening left leg pain.  Patient had a fall about a month ago.  Patient has a problem with his right knee, on schedule for total knee replacement and ever since walking with a cane or walker. He had increasing pain in the left thigh and CT scan showed a left pubic ramus fracture with two large hematomas. Orthopedic surgery did see the patient and recommends no surgical intervention.      Interval Problem Update  The patient has worsening fungal rash now spreading to his trunk, he is having night sweats and skin is wet when he awakens     Consultants/Specialty  Orthopedic surgery Dr. Olivares  Radiology Dr. Olguin    Code Status  full    Disposition  rehab    Review of Systems  Review of Systems   Constitutional: Positive for diaphoresis and malaise/fatigue. Negative for chills and fever.        Night sweats   HENT: Negative for sore throat.    Respiratory: Negative for cough, shortness of breath and stridor.    Cardiovascular: Negative for chest pain, orthopnea and leg swelling.   Gastrointestinal: Negative for abdominal pain, constipation, diarrhea, heartburn and nausea.   Genitourinary: Negative for dysuria and frequency.   Musculoskeletal: Positive for myalgias. Negative for back pain.        Left thigh pain better today, he can bend his leg farther today with drain in place   Skin: Positive for itching and rash.        Rash over thighs worse and now spreading over his scrotum and lower abdomen and itching   Neurological: Negative for dizziness, weakness and headaches.   Endo/Heme/Allergies: Does not bruise/bleed easily.        Physical Exam  Temp:  [36.5 °C (97.7 °F)-36.9 °C (98.4 °F)] 36.5 °C (97.7 °F)  Pulse:  [76-97] 83  Resp:  [16-18] 18  BP: (101-110)/(61-75)  108/74  SpO2:  [90 %-96 %] 95 %    Physical Exam   Constitutional: He is oriented to person, place, and time. No distress.   HENT:   Mouth/Throat: Oropharynx is clear and moist. No oropharyngeal exudate.   Eyes: Pupils are equal, round, and reactive to light. Conjunctivae are normal.   Neck: Neck supple. No tracheal deviation present.   Cardiovascular: Normal rate, regular rhythm and intact distal pulses.   No murmur heard.  Pulmonary/Chest: Effort normal and breath sounds normal. No stridor. No respiratory distress. He has no wheezes.   Abdominal: Soft. He exhibits no distension. There is no tenderness.   Genitourinary:   Genitourinary Comments: Rash over scrotum consistent with candida   Musculoskeletal: He exhibits tenderness. He exhibits no edema.   Tender to touch over left medial thigh  Drain in left thigh   Neurological: He is alert and oriented to person, place, and time. Coordination abnormal.   Skin: Skin is warm. Rash noted. He is not diaphoretic. There is erythema.   Tenia pedis over feet improving with topical cream  Erythema over anterior thighs now spreading and worsening to lower trunk   Psychiatric: His behavior is normal. Thought content normal.   Nursing note and vitals reviewed.      Fluids    Intake/Output Summary (Last 24 hours) at 10/3/2019 1703  Last data filed at 10/3/2019 1200  Gross per 24 hour   Intake 1280 ml   Output 1925 ml   Net -645 ml       Laboratory  Recent Labs     10/01/19  0423 10/02/19  0411 10/03/19  0408   WBC 12.4* 14.2* 12.3*   RBC 3.84* 3.71* 3.66*   HEMOGLOBIN 10.1* 9.8* 9.7*   HEMATOCRIT 33.3* 32.0* 32.3*   MCV 86.7 86.3 88.3   MCH 26.3* 26.4* 26.5*   MCHC 30.3* 30.6* 30.0*   RDW 63.3* 63.4* 65.6*   PLATELETCT 319 352 345   MPV 8.9* 8.8* 8.7*     Recent Labs     10/01/19  0423 10/02/19  0411 10/03/19  0408   SODIUM 136 133* 137   POTASSIUM 5.0 4.6 4.1   CHLORIDE 100 99 102   CO2 27 23 23   GLUCOSE 73 88 122*   BUN 10 12 10   CREATININE 0.60 0.62 0.91   CALCIUM 8.0* 8.0*  8.0*     Recent Labs     10/02/19  0859   INR 1.03               Imaging  CT-DRAIN-HEMATOMA - SEROMA   Final Result      1.  Left inguinal region muscular abscess.   2.  CT-guided drainage and catheter placement of large collection in the left obturator externus.   3.  CT-guided aspiration of smaller collection in the left pectineus muscle.      US-RUQ   Final Result      Hepatomegaly.      Status post cholecystectomy.      Dilated portal vein.      Status post cholecystectomy.      No biliary ductal dilatation.      Limited evaluation of the pancreas which is obscured.      MR-THORACIC SPINE-W/O   Final Result      1.  No acute fracture in the thoracic spine.   2.  Mild chronic compression deformity at T11.      MR-LUMBAR SPINE-W/O   Final Result      1.  Acute bilateral sacral alae fractures.   2.  There is edema in the lower posterior paraspinal soft tissue likely representing soft tissue contusion.   3.  There is no lumbar spine fractures.         CT-LSPINE W/O PLUS RECONS   Final Result      No acute fracture or traumatic malalignment.      CT-PELVIS WITH   Final Result      1.  Recent but probably subacute fracture of the left superior pubic ramus near the symphysis. Adjacent and just lateral to this are two complex masslike structures, the largest and most medial of which contains a fluid-filled cavity. These are    consistent with hematomata, possibly intramuscular.      US-EXTREMITY VENOUS LOWER UNILAT LEFT   Final Result      1.  No evidence of Left  lower extremity deep venous thrombosis.   2.  Two complex areas within the left groin, consistent with hematomata.            INTERPRETING LOCATION: [address] [place] [address], [id number]           Assessment/Plan  * Pubic ramus fracture (HCC)  Assessment & Plan  S/p mechanical GLF  MS Continue 15mg BID continue with oxycodone, patient  Still needing iv morphine for thigh pain from infection  Rehab referral placed, will plan for transfer once pain controlled with only  oral medication  Continue PT/OT  calcium and vit D for thin bones due to chronic prednisone use for rheumatoid arthritis    Candida rash of groin  Assessment & Plan  Worse today with rash now over his scrotum and spreading over his lower abdomen, will increase diflucan dose to 400mg daily    Sepsis (HCC)  Assessment & Plan  This is Sepsis Not present on admission  SIRS criteria identified on my evaluation include: Tachycardia, with heart rate greater than 90 BPM and Leukocyosis, with WBC greater than 12,000  Source is left thigh abscess  Sepsis protocol initiated  Continue antibiotics, leukocytosis improving          Abscess of left thigh  Assessment & Plan  Culture with MSSA and gram negative growth, will stop vancomycin and continue unasyn  Drain in place will monitor output  Other fluid collection is small not needing drainage per Dr. Olguin, will treat with antibiotics    Hematoma of left thigh  Assessment & Plan  Due to ground level fall,   Now with abscess, drain in place      Bilateral leg edema  Assessment & Plan  Intermittently, with low albumin  Patient has normal liver contour, concern for poor absorption enterally, discussed nutrition needs with wife and patient    Iron deficiency anemia, unspecified iron deficiency anemia type- (present on admission)  Assessment & Plan  Anemia is stable, iv iron given last admission  Will monitor labs      Essential hypertension- (present on admission)  Assessment & Plan  Well Controlled on Coreg, Losartan and lasix.     Chronic pain of right knee- (present on admission)  Assessment & Plan   Pain is much better controlled on MS contin + short acting oxycodone for breakthrough,   Continue lyrica  Iv pain medication needed for pain control from left leg abscess, will continue    Rheumatoid arthritis (HCC)- (present on admission)  Assessment & Plan  Continue Humira as outpatient once infection is cleared      VTE prophylaxis: lovenox

## 2019-10-05 LAB
ANION GAP SERPL CALC-SCNC: 9 MMOL/L (ref 0–11.9)
ANISOCYTOSIS BLD QL SMEAR: ABNORMAL
BACTERIA SPEC ANAEROBE CULT: NORMAL
BASOPHILS # BLD AUTO: 1 % (ref 0–1.8)
BASOPHILS # BLD: 0.14 K/UL (ref 0–0.12)
BUN SERPL-MCNC: 12 MG/DL (ref 8–22)
CALCIUM SERPL-MCNC: 8.3 MG/DL (ref 8.4–10.2)
CHLORIDE SERPL-SCNC: 99 MMOL/L (ref 96–112)
CO2 SERPL-SCNC: 27 MMOL/L (ref 20–33)
CREAT SERPL-MCNC: 0.89 MG/DL (ref 0.5–1.4)
EOSINOPHIL # BLD AUTO: 0.14 K/UL (ref 0–0.51)
EOSINOPHIL NFR BLD: 1 % (ref 0–6.9)
ERYTHROCYTE [DISTWIDTH] IN BLOOD BY AUTOMATED COUNT: 64.8 FL (ref 35.9–50)
GLUCOSE SERPL-MCNC: 88 MG/DL (ref 65–99)
HCT VFR BLD AUTO: 32.4 % (ref 42–52)
HGB BLD-MCNC: 9.9 G/DL (ref 14–18)
LYMPHOCYTES # BLD AUTO: 2.47 K/UL (ref 1–4.8)
LYMPHOCYTES NFR BLD: 18 % (ref 22–41)
MANUAL DIFF BLD: NORMAL
MCH RBC QN AUTO: 26.8 PG (ref 27–33)
MCHC RBC AUTO-ENTMCNC: 30.6 G/DL (ref 33.7–35.3)
MCV RBC AUTO: 87.8 FL (ref 81.4–97.8)
METAMYELOCYTES NFR BLD MANUAL: 3 %
MONOCYTES # BLD AUTO: 1.23 K/UL (ref 0–0.85)
MONOCYTES NFR BLD AUTO: 9 % (ref 0–13.4)
MYELOCYTES NFR BLD MANUAL: 1 %
NEUTROPHILS # BLD AUTO: 9.18 K/UL (ref 1.82–7.42)
NEUTROPHILS NFR BLD: 67 % (ref 44–72)
NRBC # BLD AUTO: 0 K/UL
NRBC BLD-RTO: 0 /100 WBC
PLATELET # BLD AUTO: 351 K/UL (ref 164–446)
PLATELET BLD QL SMEAR: NORMAL
PMV BLD AUTO: 8.3 FL (ref 9–12.9)
POLYCHROMASIA BLD QL SMEAR: NORMAL
POTASSIUM SERPL-SCNC: 4.5 MMOL/L (ref 3.6–5.5)
RBC # BLD AUTO: 3.69 M/UL (ref 4.7–6.1)
RBC BLD AUTO: PRESENT
SIGNIFICANT IND 70042: NORMAL
SITE SITE: NORMAL
SODIUM SERPL-SCNC: 135 MMOL/L (ref 135–145)
SOURCE SOURCE: NORMAL
WBC # BLD AUTO: 13.7 K/UL (ref 4.8–10.8)

## 2019-10-05 PROCEDURE — 700111 HCHG RX REV CODE 636 W/ 250 OVERRIDE (IP): Performed by: HOSPITALIST

## 2019-10-05 PROCEDURE — A9270 NON-COVERED ITEM OR SERVICE: HCPCS | Performed by: HOSPITALIST

## 2019-10-05 PROCEDURE — 80048 BASIC METABOLIC PNL TOTAL CA: CPT

## 2019-10-05 PROCEDURE — 700105 HCHG RX REV CODE 258: Performed by: INTERNAL MEDICINE

## 2019-10-05 PROCEDURE — 97530 THERAPEUTIC ACTIVITIES: CPT

## 2019-10-05 PROCEDURE — 85027 COMPLETE CBC AUTOMATED: CPT

## 2019-10-05 PROCEDURE — 700102 HCHG RX REV CODE 250 W/ 637 OVERRIDE(OP): Performed by: HOSPITALIST

## 2019-10-05 PROCEDURE — 85007 BL SMEAR W/DIFF WBC COUNT: CPT

## 2019-10-05 PROCEDURE — 36415 COLL VENOUS BLD VENIPUNCTURE: CPT

## 2019-10-05 PROCEDURE — A9270 NON-COVERED ITEM OR SERVICE: HCPCS | Performed by: INTERNAL MEDICINE

## 2019-10-05 PROCEDURE — 97110 THERAPEUTIC EXERCISES: CPT

## 2019-10-05 PROCEDURE — 770001 HCHG ROOM/CARE - MED/SURG/GYN PRIV*

## 2019-10-05 PROCEDURE — 99255 IP/OBS CONSLTJ NEW/EST HI 80: CPT | Performed by: INTERNAL MEDICINE

## 2019-10-05 PROCEDURE — 700102 HCHG RX REV CODE 250 W/ 637 OVERRIDE(OP): Performed by: INTERNAL MEDICINE

## 2019-10-05 PROCEDURE — 99233 SBSQ HOSP IP/OBS HIGH 50: CPT | Performed by: INTERNAL MEDICINE

## 2019-10-05 PROCEDURE — 700111 HCHG RX REV CODE 636 W/ 250 OVERRIDE (IP): Performed by: INTERNAL MEDICINE

## 2019-10-05 PROCEDURE — 97116 GAIT TRAINING THERAPY: CPT

## 2019-10-05 RX ADMIN — MORPHINE SULFATE 4 MG: 4 INJECTION INTRAVENOUS at 07:58

## 2019-10-05 RX ADMIN — AMPICILLIN AND SULBACTAM 3 G: 1; 2 INJECTION, POWDER, FOR SOLUTION INTRAMUSCULAR; INTRAVENOUS at 00:15

## 2019-10-05 RX ADMIN — FERROUS SULFATE TAB 325 MG (65 MG ELEMENTAL FE) 325 MG: 325 (65 FE) TAB at 06:40

## 2019-10-05 RX ADMIN — MORPHINE SULFATE 30 MG: 30 TABLET, EXTENDED RELEASE ORAL at 17:42

## 2019-10-05 RX ADMIN — MORPHINE SULFATE 4 MG: 4 INJECTION INTRAVENOUS at 17:42

## 2019-10-05 RX ADMIN — OXYCODONE HYDROCHLORIDE 5 MG: 5 TABLET ORAL at 00:15

## 2019-10-05 RX ADMIN — AMPICILLIN AND SULBACTAM 3 G: 1; 2 INJECTION, POWDER, FOR SOLUTION INTRAMUSCULAR; INTRAVENOUS at 11:58

## 2019-10-05 RX ADMIN — ACETAMINOPHEN 650 MG: 325 TABLET, FILM COATED ORAL at 20:49

## 2019-10-05 RX ADMIN — MICONAZOLE NITRATE: 20 CREAM TOPICAL at 17:51

## 2019-10-05 RX ADMIN — PREGABALIN 25 MG: 25 CAPSULE ORAL at 11:58

## 2019-10-05 RX ADMIN — MORPHINE SULFATE 4 MG: 4 INJECTION INTRAVENOUS at 12:59

## 2019-10-05 RX ADMIN — AMPICILLIN AND SULBACTAM 3 G: 1; 2 INJECTION, POWDER, FOR SOLUTION INTRAMUSCULAR; INTRAVENOUS at 06:44

## 2019-10-05 RX ADMIN — ENOXAPARIN SODIUM 40 MG: 100 INJECTION SUBCUTANEOUS at 06:41

## 2019-10-05 RX ADMIN — SENNOSIDES, DOCUSATE SODIUM 2 TABLET: 50; 8.6 TABLET, FILM COATED ORAL at 17:41

## 2019-10-05 RX ADMIN — CALCIUM CITRATE 200 MG (950 MG) TABLET 950 MG: at 06:40

## 2019-10-05 RX ADMIN — MORPHINE SULFATE 4 MG: 4 INJECTION INTRAVENOUS at 01:27

## 2019-10-05 RX ADMIN — PREGABALIN 25 MG: 25 CAPSULE ORAL at 06:38

## 2019-10-05 RX ADMIN — OMEPRAZOLE 40 MG: 20 CAPSULE, DELAYED RELEASE ORAL at 17:41

## 2019-10-05 RX ADMIN — OXYCODONE HYDROCHLORIDE 5 MG: 5 TABLET ORAL at 16:05

## 2019-10-05 RX ADMIN — NICOTINE 14 MG: 14 PATCH, EXTENDED RELEASE TRANSDERMAL at 06:41

## 2019-10-05 RX ADMIN — AMPICILLIN AND SULBACTAM 3 G: 1; 2 INJECTION, POWDER, FOR SOLUTION INTRAMUSCULAR; INTRAVENOUS at 23:52

## 2019-10-05 RX ADMIN — SENNOSIDES, DOCUSATE SODIUM 2 TABLET: 50; 8.6 TABLET, FILM COATED ORAL at 06:38

## 2019-10-05 RX ADMIN — FLUCONAZOLE 400 MG: 200 TABLET ORAL at 06:36

## 2019-10-05 RX ADMIN — AMPICILLIN AND SULBACTAM 3 G: 1; 2 INJECTION, POWDER, FOR SOLUTION INTRAMUSCULAR; INTRAVENOUS at 17:42

## 2019-10-05 RX ADMIN — OXYCODONE HYDROCHLORIDE 5 MG: 5 TABLET ORAL at 11:58

## 2019-10-05 RX ADMIN — OMEPRAZOLE 40 MG: 20 CAPSULE, DELAYED RELEASE ORAL at 06:37

## 2019-10-05 RX ADMIN — MORPHINE SULFATE 4 MG: 4 INJECTION INTRAVENOUS at 22:36

## 2019-10-05 RX ADMIN — OXYCODONE HYDROCHLORIDE 5 MG: 5 TABLET ORAL at 20:49

## 2019-10-05 RX ADMIN — MICONAZOLE NITRATE: 20 CREAM TOPICAL at 06:44

## 2019-10-05 RX ADMIN — MORPHINE SULFATE 30 MG: 30 TABLET, EXTENDED RELEASE ORAL at 06:38

## 2019-10-05 RX ADMIN — PREGABALIN 25 MG: 25 CAPSULE ORAL at 17:41

## 2019-10-05 ASSESSMENT — ENCOUNTER SYMPTOMS
STRIDOR: 0
NAUSEA: 1
SPUTUM PRODUCTION: 0
TINGLING: 0
BACK PAIN: 0
DIAPHORESIS: 0
VOMITING: 0
FEVER: 0
SHORTNESS OF BREATH: 0
CONSTIPATION: 0
CHILLS: 1
MYALGIAS: 1
HEADACHES: 0
WEAKNESS: 0
DIARRHEA: 0
CHILLS: 0
DIZZINESS: 0
ABDOMINAL PAIN: 0
HEARTBURN: 0
NAUSEA: 0
COUGH: 0
ORTHOPNEA: 0

## 2019-10-05 ASSESSMENT — COGNITIVE AND FUNCTIONAL STATUS - GENERAL
SUGGESTED CMS G CODE MODIFIER MOBILITY: CK
MOBILITY SCORE: 16
TURNING FROM BACK TO SIDE WHILE IN FLAT BAD: A LITTLE
MOVING TO AND FROM BED TO CHAIR: A LITTLE
WALKING IN HOSPITAL ROOM: A LITTLE
CLIMB 3 TO 5 STEPS WITH RAILING: TOTAL
MOVING FROM LYING ON BACK TO SITTING ON SIDE OF FLAT BED: A LITTLE
STANDING UP FROM CHAIR USING ARMS: A LITTLE

## 2019-10-05 ASSESSMENT — GAIT ASSESSMENTS
ASSISTIVE DEVICE: FRONT WHEEL WALKER
GAIT LEVEL OF ASSIST: MINIMAL ASSIST
DISTANCE (FEET): 10

## 2019-10-05 NOTE — PROGRESS NOTES
Bedside report received from night RN. Assumed care of patient. Daily plan of care discussed. Pt awake and alert, reports 8/10 pain and requesting prn morphine. Will medicate per MAR. Hourly rounding in place.

## 2019-10-05 NOTE — CARE PLAN
Problem: Venous Thromboembolism (VTW)/Deep Vein Thrombosis (DVT) Prevention:  Goal: Patient will participate in Venous Thrombosis (VTE)/Deep Vein Thrombosis (DVT)Prevention Measures  Outcome: PROGRESSING AS EXPECTED     Problem: Knowledge Deficit  Goal: Knowledge of disease process/condition, treatment plan, diagnostic tests, and medications will improve  Outcome: PROGRESSING AS EXPECTED

## 2019-10-05 NOTE — THERAPY
"Physical Therapy Treatment completed.   Bed Mobility:  Supine to Sit: Supervised  Transfers: Sit to Stand: Minimal Assist  Gait: Level Of Assist: Minimal Assist with Front-Wheel Walker       Plan of Care: Will benefit from Physical Therapy 5 times per week  Discharge Recommendations: Equipment: Will Continue to Assess for Equipment Needs. Post-acute therapy Discharge to a transitional care facility for continued skilled therapy services. and Discharge to home with outpatient or home health for additional skilled therapy services.     Pt remains highly motivated despite not feeling well today. Pt reports increased pain in L ant hip/groin and increased swelling and pain in R knee. Pt feeling feverish today and lacking energy. Pt encouraged to do short bouts of activity to tolerance. Pt is eager to get stronger and participate in therapy. Will continue to follow pt during acute stay. Hopefully home with  vs rehab. LK     See \"Rehab Therapy-Acute\" Patient Summary Report for complete documentation.       "

## 2019-10-05 NOTE — CONSULTS
INFECTIOUS DISEASES INPATIENT CONSULT NOTE     Date of Service: 10/5/2019    Consult Requested By: Rhina Varma M.D.    Reason for Consultation: Left thigh abscess    History of Present Illness:   Richard Hubbard II is a 44 y.o. man with a history of rheumatoid arthritis on Humira, obesity, chronic right knee pain awaiting right total knee arthroplasty with recent admission for acute blood loss anemia secondary to GI bleed status post blood transfusion admitted 9/27/2019 for worsening left thigh pain.  Patient sustained a fall at a store approximately 1 month prior to admission.  He was walking with a cane and his cane got caught on the floor mat resulting in a fall.  He did not hit his head, however he landed in a position where he sustained left groin and thigh pain.  He initially presented to urgent care center in [place] and was given pain medications.  At that time no imaging was performed.  Over the next several weeks, he noted worsening left thigh and leg pain for which he had to rely more on his cane.  He was taking NSAIDs for his pain and he was scheduled to have a right total knee replacement however routine blood work revealed acute blood loss anemia.  He was hospitalized on 9/13/2019 for an upper GI bleed secondary to NSAIDs and received blood transfusions.  His surgical procedure however was then postponed.  Over the next week or so, his left thigh inguinal pain became much more severe, 10 out of 10 in severity associated with subjective fevers and chills.  His pain was so severe he was unable to get out of bed.  Pain exacerbated with any movement or exertion.  As such, he presented to the ED for further evaluation and management.  On presentation, he was afebrile without any leukocytosis.  CT of the pelvis revealed a subacute fracture of the left superior pubic ramus near the symphysis and a complex masslike structure concerning for fluid-filled cavity consistent with hematoma.  His course was  complicated by increasing leukocytosis.  Repeat imaging revealed 2 complex areas within the left groin.  He was started on antibiotics due to concerns for possible underlying abscess.  CT-guided drainage of the abscess was performed on 10/2/2019 with cultures growing MSSA.  Patient is currently on Unasyn and fluconazole for intertrigo.  At this time, he denies any cough, shortness of breath, chest pain, abdominal pain, diarrhea or dysuria.  Blood cultures on 10/2 have been negative.  Infectious disease service consulted for further antibiotic recommendations and management.      Review of Systems   Constitutional: Negative for chills and fever.   Respiratory: Negative for cough and shortness of breath.    Cardiovascular: Negative for chest pain.   Gastrointestinal: Negative for abdominal pain, diarrhea, nausea and vomiting.   Genitourinary: Negative for dysuria.   Musculoskeletal: Positive for joint pain and myalgias.        Right knee  Left thigh   Neurological: Negative for dizziness and headaches.       PMH:   Past Medical History:   Diagnosis Date   • ADD (attention deficit disorder)    • Alcohol abuse    • Allergic rhinitis 5/21/2009   • Anemia 09/2019   • Anxiety 5/21/2009   • Back pain 5/21/2009   • Bipolar disorder (HCC)    • Bleeding ulcer 5/21/2009   • Depression 5/21/2009   • Eczema 5/21/2009   • History of bleeding ulcers 2/12/2015   • Hypertension    • Insomnia 5/21/2009   • Migraine 5/21/2009   • Obesity, morbid (HCC) 5/21/2009   • Pain 09/2019    Chronic knee and back pain   • Rheumatoid arteritis 09/2019    knee, feet   • Sleep apnea 5/21/2009    Did not tolerate cpap, does not use it   • Snoring        PSH:  Past Surgical History:   Procedure Laterality Date   • GASTRIC BYPASS LAPAROSCOPIC  2000    Lucila en y   • CHOLECYSTECTOMY  2000       FAMILY HX:  Family History   Problem Relation Age of Onset   • Hypertension Mother    • Arthritis Mother    • Depression Mother    • Cancer Father         bladder    • Schizophrenia Father    • Cancer Maternal Grandmother         breast   • Heart Disease Maternal Grandmother    • Psychiatric Illness Other    • Stroke Maternal Aunt    • Other Neg Hx         no connective tissue disorders or known aortic disease       SOCIAL HX:  Social History     Socioeconomic History   • Marital status:      Spouse name: Not on file   • Number of children: Not on file   • Years of education: Not on file   • Highest education level: Not on file   Occupational History   • 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   Social Needs   • Financial resource strain: Not on file   • Food insecurity:     Worry: Not on file     Inability: Not on file   • Transportation needs:     Medical: Not on file     Non-medical: Not on file   Tobacco Use   • Smoking status: Current Every Day Smoker     Packs/day: 0.25     Years: 3.00     Pack years: 0.75     Types: Cigarettes, Cigars   • Smokeless tobacco: Never Used   • Tobacco comment: occasional cigar   Substance and Sexual Activity   • Alcohol use: No     Alcohol/week: 0.0 oz     Comment: quit 2.5 yrs ago   • Drug use: No   • Sexual activity: Yes     Partners: Female     Comment: ;    Lifestyle   • Physical activity:     Days per week: Not on file     Minutes per session: Not on file   • Stress: Not on file   Relationships   • Social connections:     Talks on phone: Not on file     Gets together: Not on file     Attends Sikhism service: Not on file     Active member of club or organization: Not on file     Attends meetings of clubs or organizations: Not on file     Relationship status: Not on file   • Intimate partner violence:     Fear of current or ex partner: Not on file     Emotionally abused: Not on file     Physically abused: Not on file     Forced sexual activity: Not on file   Other Topics Concern   •  Service No   • Blood Transfusions Yes   • Caffeine Concern No   • Occupational Exposure No   • Hobby Hazards No   • Sleep Concern No   • Stress  "Concern Yes   • Weight Concern Yes   • Special Diet No   • Back Care No   • Exercise No   • Bike Helmet No   • Seat Belt Yes   • Self-Exams Yes   Social History Narrative    , lives in [place]     Social History     Tobacco Use   Smoking Status Current Every Day Smoker   • Packs/day: 0.25   • Years: 3.00   • Pack years: 0.75   • Types: Cigarettes, Cigars   Smokeless Tobacco Never Used   Tobacco Comment    occasional cigar     Social History     Substance and Sexual Activity   Alcohol Use No   • Alcohol/week: 0.0 oz    Comment: quit 2.5 yrs ago       Allergies/Intolerances:  Allergies   Allergen Reactions   • Benadryl [Altaryl]      \"I get agitated\"   • Nsaids      Bleeding, \"I had a bleeding ulcer\"   • Phenergan [Promethazine Hcl]      \"I get very agitated\"   • Penicillins      \"Had some dizziness\"       History reviewed with the patient     Other Current Medications:    Current Facility-Administered Medications:   •  fluconazole (DIFLUCAN) tablet 400 mg, 400 mg, Oral, DAILY, Rhina Varma M.D., 400 mg at 10/05/19 0636  •  enoxaparin (LOVENOX) inj 40 mg, 40 mg, Subcutaneous, DAILY, Rhina Varma M.D., 40 mg at 10/05/19 0641  •  miconazole (MICOTIN) 2 % cream, , Topical, BID, Rhina Varma M.D.  •  ampicillin/sulbactam (UNASYN) 3 g in  mL IVPB, 3 g, Intravenous, Q6HRS, Rhina Varma M.D., Stopped at 10/05/19 0714  •  vitamin D (cholecalciferol) tablet 1,000 Units, 1,000 Units, Oral, DAILY, Rhina Varma M.D., 1,000 Units at 10/04/19 0517  •  calcium citrate (CALCITRATE) tablet 950 mg, 950 mg, Oral, DAILY, Rhina Varma M.D., 950 mg at 10/05/19 0640  •  oxyCODONE immediate-release (ROXICODONE) tablet 5 mg, 5 mg, Oral, Q4HRS PRN, Israel Pena M.D., 5 mg at 10/05/19 0015  •  morphine ER (MS CONTIN) tablet 30 mg, 30 mg, Oral, Q12HRS, Israel Pena M.D., 30 mg at 10/05/19 0638  •  ferrous sulfate tablet 325 mg, 325 mg, Oral, DAILY, Jessica Mccray M.D., 325 mg at 10/05/19 0640  • " " furosemide (LASIX) tablet 20 mg, 20 mg, Oral, BID DIURETIC, Jessica Mccray M.D., Stopped at 10/04/19 1600  •  losartan (COZAAR) tablet 25 mg, 25 mg, Oral, DAILY, Jessica Mccray M.D., Stopped at 10/05/19 0600  •  omeprazole (PRILOSEC) capsule 40 mg, 40 mg, Oral, BID, Jsesica Mccray M.D., 40 mg at 10/05/19 0637  •  pregabalin (LYRICA) capsule 25 mg, 25 mg, Oral, TID, Jessica Mccray M.D., 25 mg at 10/05/19 0638  •  senna-docusate (PERICOLACE or SENOKOT S) 8.6-50 MG per tablet 2 Tab, 2 Tab, Oral, BID, 2 Tab at 10/05/19 0638 **AND** polyethylene glycol/lytes (MIRALAX) PACKET 1 Packet, 1 Packet, Oral, QDAY PRN **AND** magnesium hydroxide (MILK OF MAGNESIA) suspension 30 mL, 30 mL, Oral, QDAY PRN **AND** bisacodyl (DULCOLAX) suppository 10 mg, 10 mg, Rectal, QDAY PRN, Jessica Mccray M.D.  •  acetaminophen (TYLENOL) tablet 650 mg, 650 mg, Oral, Q6HRS PRN, Jessica Mccray M.D.  •  nicotine (NICODERM) 14 MG/24HR 14 mg, 14 mg, Transdermal, Daily-0600, 14 mg at 10/05/19 0641 **AND** Nicotine Replacement Patient Education Materials, , , Once **AND** nicotine polacrilex (NICORETTE) 2 MG piece 2 mg, 2 mg, Oral, Q HOUR PRN, Israel Pena M.D.  •  morphine (pf) 4 mg/ml injection 4 mg, 4 mg, Intravenous, Q4HRS PRN, Israel Pena M.D., 4 mg at 10/05/19 6118  [unfilled]    Most Recent Vital Signs:  BP (!) 95/55   Pulse 68   Temp 36.6 °C (97.9 °F) (Temporal)   Resp 18   Ht 1.803 m (5' 10.98\")   Wt 99.6 kg (219 lb 9.3 oz)   SpO2 94%   BMI 30.64 kg/m²   Temp  Av.7 °C (98.1 °F)  Min: 36.3 °C (97.4 °F)  Max: 37.5 °C (99.5 °F)    Physical Exam:  General: well nourished, no diaphoresis, well-appearing, no acute distress, nontoxic  HEENT: sclera anicteric, PERRL, extraocular muscles intact, mucous membranes moist, oropharynx clear and moist, no oral lesions or exudate  Neck: supple, no lymphadenopathy  Chest: CTAB, no rales, rhonchi or wheezes, normal work of " breathing.  Cardiac: regular rate, normal S1 S2, + holosystolic murmur but no rubs or gallops  Abdomen: + bowel sounds, soft, non-tender, non-distended, no hepatosplenomegaly.  Left groin/inner thigh BRAXTON drain in place.  There is no surrounding erythema or edema  Extremities: WWP, no edema, 2+ pedal pulses  Skin: Evidence of intertrigo under pannus extending to bilateral groin and thighs  Neuro: Alert and oriented times 3, non-focal exam, speech fluent, full range of motion to bilateral upper and lower extremities  Psych: normal mood and behavior, pleasant; memory intact, normal judgement    Pertinent Lab Results:  Recent Labs     10/03/19  0408 10/04/19  0417 10/05/19  0428   WBC 12.3* 12.9* 13.7*      Recent Labs     10/03/19  0408 10/04/19  0417 10/05/19  0428   HEMOGLOBIN 9.7* 9.9* 9.9*   HEMATOCRIT 32.3* 32.8* 32.4*   MCV 88.3 86.5 87.8   MCH 26.5* 26.1* 26.8*   ANISOCYTOSIS 1+ 1+ 1+   PLATELETCT 345 337 351         Recent Labs     10/03/19  0408 10/04/19  0417 10/05/19  0428   SODIUM 137 136 135   POTASSIUM 4.1 4.5 4.5   CHLORIDE 102 99 99   CO2 23 28 27   CREATININE 0.91 0.79 0.89        No results for input(s): ALBUMIN in the last 72 hours.    Invalid input(s): AST, ALT, ALKPHOS, BILITOT, TOTALBILIRUB, BILIRUBINTOT, BILIRUBINDIR, BILIRUBININD, ALKALINEPHOS     Pertinent Micro:  Results     Procedure Component Value Units Date/Time    Anaerobic Culture [[id number]] Collected:  10/02/19 1330    Order Status:  Completed Specimen:  Wound Updated:  10/04/19 0820     Significant Indicator NEG     Source WND     Site Left Thigh Abscess     Culture Result Culture in progress.    Narrative:       Radiology specimen    CULTURE WOUND W/ GRAM STAIN [[id number]]  (Abnormal)  (Susceptibility) Collected:  10/02/19 1330    Order Status:  Completed Specimen:  Wound Updated:  10/04/19 0820     Significant Indicator POS     Source WND     Site Left Thigh Abscess     Culture Result -     Gram Stain Result Many WBCs.  Moderate Gram  "positive cocci.  Rare Gram negative rods.       Culture Result Staphylococcus aureus  Moderate growth      Narrative:       Radiology specimen    Susceptibility     Staphylococcus aureus (1)     Antibiotic Interpretation Microscan Method Status    Clindamycin Sensitive <=0.5 mcg/mL MARÍA Final    Daptomycin Sensitive <=0.5 mcg/mL MARÍA Final    Erythromycin Sensitive <=0.5 mcg/mL MARÍA Final    Ampicillin/sulbactam Sensitive <=8/4 mcg/mL MARÍA Final    Moxifloxacin Intermediate 4 mcg/mL MARÍA Final    Vancomycin Sensitive 1 mcg/mL MARÍA Final    Oxacillin Sensitive <=0.25 mcg/mL MARÍA Final    Trimeth/Sulfa Sensitive <=0.5/9.5 mcg/mL MARÍA Final    Tetracycline Sensitive <=4 mcg/mL MARÍA Final                   Blood Culture [[id number]] Collected:  10/02/19 1541    Order Status:  Completed Specimen:  Blood from Peripheral Updated:  10/03/19 0730     Significant Indicator NEG     Source BLD     Site Peripheral     Culture Result No Growth  Note: Blood cultures are incubated for 5 days and  are monitored continuously.Positive blood cultures  are called to the RN and reported as soon as  they are identified.  Blood culture testing and Gram stain, if indicated, are  performed at [facility], [address] [place] [address].  Positive blood cultures are  sent to [facility], [address] [place] [address], for organism identification and  susceptibility testing.      Narrative:       From different peripheral sites, if not done within the last  24 hours (Per Hospital Policy: Only change specimen source to  \"Line\" if specified by physician order)  Left Hand    Blood Culture [[id number]] Collected:  10/02/19 1540    Order Status:  Completed Specimen:  Blood from Peripheral Updated:  10/03/19 0730     Significant Indicator NEG     Source BLD     Site Peripheral     Culture Result No Growth  Note: Blood cultures are incubated for 5 days and  are monitored continuously.Positive blood " "cultures  are called to the RN and reported as soon as  they are identified.  Blood culture testing and Gram stain, if indicated, are  performed at [facility], [address] [place] [address].  Positive blood cultures are  sent to [facility], [address] [place] [address], for organism identification and  susceptibility testing.      Narrative:       From different peripheral sites, if not done within the last  24 hours (Per Hospital Policy: Only change specimen source to  \"Line\" if specified by physician order)  Right AC    GRAM STAIN [[id number]] Collected:  10/02/19 1330    Order Status:  Completed Specimen:  Wound Updated:  10/03/19 0107     Significant Indicator .     Source WND     Site Left Thigh Abscess     Gram Stain Result Many WBCs.  Moderate Gram positive cocci.  Rare Gram negative rods.      Narrative:       Radiology specimen    FLUID CULTURE W/GRAM STAIN [[id number]] Collected:  10/02/19 1330    Order Status:  Canceled Specimen:  Other     FLUID CULTURE [[id number]]     Order Status:  No result Specimen:  Other Body Fluid         No results found for: BLOODCULTU, BLDCULT, BCHOLD     Studies:  Ct-drain-hematoma - Seroma    Result Date: 10/2/2019  10/2/2019 12:14 PM HISTORY/REASON FOR EXAM:  Left inguinal fluid collection. Moderate sedation was administered with continuous monitoring of the patient under the direct supervision of  Total sedation time: 30 minutes The biopsy/drainage was done utilizing low dose optimization CT techniques including auto modulation for  imaging and low mA CT/fluoroscopy mode for the procedure. PROCEDURE: After the informed consent the patient was placed on the CT table on supine position. The skin over the left groin was prepped. Localization CT scan demonstrates fluid collection in the left obturator externus and pectineus muscles. Subsequently after injecting lidocaine needle was advanced into the larger " fluid collection. Pus was aspirated. Subsequently a wire was introduced. After dilating the tract an 8 English drain was introduced and placed with its loop in the fluid collection. An approximately 100 mL of fluid was aspirated. Subsequently under the needle was advanced into the superficial/pectineus muscle fluid collection. An approximately 10 mL of fluid was aspirated. No drain was placed in the superficial fluid collection. A sample of the material was sent to the microbe allergy. The patient tolerated the procedure without any immediate complication.     1.  Left inguinal region muscular abscess. 2.  CT-guided drainage and catheter placement of large collection in the left obturator externus. 3.  CT-guided aspiration of smaller collection in the left pectineus muscle.    Ct-lspine W/o Plus Recons    Result Date: 9/28/2019 9/28/2019 4:58 AM HISTORY/REASON FOR EXAM: Back pain after recent fall TECHNIQUE/EXAM DESCRIPTION: CT lumbar spine without contrast, with reconstructions. Noncontrast helical scanning was obtained from T12 through the sacrum.  Sagittal reconstructions were generated from the axial images. Low dose optimization technique was utilized for this CT exam including automated exposure control and adjustment of the mA and/or kV according to patient size. COMPARISON:  None. FINDINGS: The bones are markedly osteopenic. There is a minimal compression deformity of the superior endplate of the L2 body, which is old. There is no evidence of acute fracture. There is multilevel interspace and facet arthropathy.     No acute fracture or traumatic malalignment.    Ct-pelvis With    Result Date: 9/28/2019 9/28/2019 3:21 AM HISTORY/REASON FOR EXAM:  significant left groin pain, possible hematoma on us. TECHNIQUE/EXAM DESCRIPTION AND NUMBER OF VIEWS: CT scan of the pelvis with contrast. Contrast-enhanced helical scanning of the pelvis was obtained from the iliac crests through the pubic symphysis following the  bolus administration of 100 mL of Omnipaque 350 nonionic contrast without complication. Low dose optimization technique was utilized for this CT exam including automated exposure control and adjustment of the mA and/or kV according to patient size. COMPARISON:  None. FINDINGS: In the deep left inguinal region, there is a masslike structure containing irregular fluid-filled cavity, which measures overall about 8.6 cm in longest dimension. Within the cavity or focal areas of increased attenuation consistent with calcification. This lesion is immediately adjacent to a recent no probably subacute fracture of the left superior pubic ramus near the symphysis. Lateral to this lesion is a smaller complex mass measuring about 7.3 cm maximum dimension. There is no abnormality within the visualized pelvic cavity and there is no free intraperitoneal fluid. The bladder is intact.     1.  Recent but probably subacute fracture of the left superior pubic ramus near the symphysis. Adjacent and just lateral to this are two complex masslike structures, the largest and most medial of which contains a fluid-filled cavity. These are consistent with hematomata, possibly intramuscular.    Dx-lumbar Spine-2 Or 3 Views    Result Date: 9/16/2019 9/16/2019 4:15 PM HISTORY/REASON FOR EXAM:  Atraumatic Pain TECHNIQUE/ EXAM DESCRIPTION AND NUMBER OF VIEWS:  3 views of the lumbar spine. COMPARISON: None. FINDINGS: Dextroconvex lumbar curvature. Evaluation of the sacrum is limited due to overlying bowel content. No acute fracture. No malalignment. No compression deformity. Mild to moderate degenerative change of the lumbar spine.     Dextroconvex lumbar curvature. No acute osseous abnormality. No malalignment.    Dx-thoracic Spine-with Swimmers View    Result Date: 9/16/2019 9/16/2019 4:15 PM HISTORY/REASON FOR EXAM:  Atraumatic Pain; ?scoliotic MId back pain and low back pain after GLF x 2 weeks TECHNIQUE/EXAM DESCRIPTION AND NUMBER OF VIEWS:   Thoracic spine, 3 views. COMPARISON:  None. FINDINGS: Diffuse osseous demineralization, limiting evaluation for nondisplaced fracture. No acute fracture. No malalignment. Mild chronic height loss of the mid thoracic spine from T9 to T11. Mild degenerative change of the thoracic spine.     Mild chronic height loss of the mid thoracic spine from T9 to T11.    Mr-lumbar Spine-w/o    Result Date: 9/28/2019 9/28/2019 6:21 PM HISTORY/REASON FOR EXAM:  back pain. TECHNIQUE/EXAM DESCRIPTION: MRI of the lumbar spine without contrast. The study was performed on a [employer] 1.5 Mae MRI scanner. T1 sagittal, T2 fast spin-echo sagittal, and T2 axial images were obtained of the lumbar spine. COMPARISON:  None. FINDINGS: There are bilateral sacral alar fractures. The lumbar spine maintains normal height and alignment. At the level of L5-S1, there is no spinal or neural foraminal stenosis. At the level of L4-5, there is no spinal or neural foraminal stenosis. At the level of L3-4, there is no spinal or neural foraminal stenosis. At the level of the L2-3, there is no spinal or neural foraminal stenosis. At the level of L1-2, there is no spinal or neural foraminal stenosis. The conus terminates at the level of L1. The visualized lower thoracic spinal cord appear normal. There is edema in the posterior paraspinal soft tissue likely representing soft tissue contusion.     1.  Acute bilateral sacral alae fractures. 2.  There is edema in the lower posterior paraspinal soft tissue likely representing soft tissue contusion. 3.  There is no lumbar spine fractures.     Mr-thoracic Spine-w/o    Result Date: 9/28/2019 9/28/2019 6:21 PM HISTORY/REASON FOR EXAM:  back pain TECHNIQUE/EXAM DESCRIPTION: MRI of the thoracic spine without contrast. The study was performed on a [employer] 1.5 Mae MRI scanner. T1 sagittal, T2 fast spin-echo sagittal, and T2 axial images were obtained of the thoracic spine. COMPARISON: None. FINDINGS: There is  mild chronic compression deformity at T11. There is no fracture or dislocation. There is no pathologic marrow infiltration. There is no intradural extramedullary lesion. There is no abnormal intramedullary T2 signal intensity in the thoracic spinal cord.     1.  No acute fracture in the thoracic spine. 2.  Mild chronic compression deformity at T11.    Us-extremity Venous Lower Unilat Left    Result Date: 9/28/2019  HISTORY/REASON FOR EXAM:  Leg swelling after fall TECHNIQUE/EXAM DESCRIPTION: Left lower extremity doppler venous ultrasound was performed. Using both color and pulse-wave Doppler imaging, multiple images were obtained from the common femoral vein origins distally through the popliteal trifurcations.  9/28/2019 12:54 AM COMPARISON:  None. FINDINGS: REAL-TIME GRAY-SCALE IMAGING: Real-time gray-scale imaging reveals no evidence of focal wall thickening. COLOR AND DUPLEX DOPPLER IMAGING: Graded compression was used to demonstrate patent lumens.  There is no evidence for luminal thrombus.  There is normal response to augmentation and respiratory variation. There are two rounded complex areas in the left groin region, the largest measuring about 10.9 cm diameter and the smaller, about 8.3 cm in diameter, both of which are consistent with hematomata.     1.  No evidence of Left  lower extremity deep venous thrombosis. 2.  Two complex areas within the left groin, consistent with hematomata. INTERPRETING LOCATION: [address] [facility], [address]    Us-ruq    Result Date: 10/1/2019  10/1/2019 5:49 PM HISTORY/REASON FOR EXAM:  Edema Abdominal pain TECHNIQUE/EXAM DESCRIPTION AND NUMBER OF VIEWS:  Real-time sonography of the liver and biliary tree. COMPARISON: None FINDINGS: The liver is normal in contour. There is no evidence of solid mass lesion. The liver measures 20.15 cm. The gallbladder is surgically absent. The common duct measures 0.44 cm. The pancreas is obscured by bowel gas. The visualized aorta is normal in  caliber. Intrahepatic IVC is patent. The portal vein is patent with hepatopetal flow. The MPV measures 1.57 cm. The right kidney measures 11.06 cm. There is no hydronephrosis. There is no ascites.     Hepatomegaly. Status post cholecystectomy. Dilated portal vein. Status post cholecystectomy. No biliary ductal dilatation. Limited evaluation of the pancreas which is obscured.      IMPRESSION:   1.  Left thigh abscess    2.  MSSA infection  3.  Intertrigo  4.  Leukocytosis  5.  Rheumatoid arthritis  6.  Chronic immunosuppression      PLAN:   Richard Hubbard II is a 44 y.o. man with a history of rheumatoid arthritis on Humira, chronic right knee pain with plans for total right knee arthroplasty, and recent fall sustained 1 month prior to admission admitted for worsening left thigh/groin pain.  Patient found to have multiple fluid collections on imaging which were concerning for an abscess.  He underwent CT-guided drainage on 10/2 with cultures growing MSSA.  Patient also has evidence of intertrigo for which he is on fluconazole.  Blood cultures have been negative.  Agree with continuing Unasyn for now as it also has some gram-negative coverage since Gram stain showed GNR's.  He does have persistent leukocytosis which is stable.  If his white count continues to worsen and increase tomorrow, recommend repeat imaging to assess if there is any interval development of any new abscesses.  Further recommendations per clinical course.      Plan of care discussed with IM Rhina Varma M.D.. Will continue to follow    Jessica Goldman M.D.      Please note that this dictation was created using voice recognition software. I have worked with technical experts from [facility] to optimize the interface.  I have made every reasonable attempt to correct obvious errors, but there may be errors of grammar and possibly content that I did not discover before finalizing the note.

## 2019-10-05 NOTE — PROGRESS NOTES
[facility] Medicine Daily Progress Note    Date of Service  10/5/2019    Chief Complaint  44 y.o. male admitted 9/27/2019 with left thigh pain    Hospital Course    44 y.o. male who presented to the emergency department on 9/27/2019 for evaluation of worsening left leg pain.  Patient had a fall about a month ago.  Patient has a problem with his right knee, on schedule for total knee replacement and ever since walking with a cane or walker. He had increasing pain in the left thigh and CT scan showed a left pubic ramus fracture with two large hematomas. Orthopedic surgery did see the patient and recommends no surgical intervention.  The patient had a rising white cell count and increasing pain so hematoma drainage was done by interventional radiology and tiffanie pus was drained consistent with an abscess. Cultures grew MSSA and drain was left in place.      Interval Problem Update  The patient has increased left leg pain medially today with chills and nausea    Consultants/Specialty  Orthopedic surgery Dr. Olivares  Radiology Dr. Olguin    Code Status  full    Disposition  rehab    Review of Systems  Review of Systems   Constitutional: Positive for chills and malaise/fatigue. Negative for diaphoresis and fever.        Night sweats   HENT: Negative for congestion.    Respiratory: Negative for cough, sputum production, shortness of breath and stridor.    Cardiovascular: Negative for chest pain, orthopnea and leg swelling.   Gastrointestinal: Positive for nausea. Negative for abdominal pain, constipation, diarrhea and heartburn.   Genitourinary: Negative for dysuria, frequency and urgency.   Musculoskeletal: Positive for joint pain and myalgias. Negative for back pain.        Left thigh pain now more medially  Right knee pain   Skin: Positive for rash. Negative for itching.        Rash over thighs improving   Neurological: Negative for dizziness, tingling and weakness.        Physical Exam  Temp:  [36.6 °C (97.9 °F)-36.8  °C (98.2 °F)] 36.8 °C (98.2 °F)  Pulse:  [68-90] 90  Resp:  [18] 18  BP: ()/(55-71) 105/71  SpO2:  [94 %-99 %] 99 %    Physical Exam   Constitutional: He is oriented to person, place, and time. No distress.   HENT:   Mouth/Throat: No oropharyngeal exudate.   Eyes: Pupils are equal, round, and reactive to light. Conjunctivae and EOM are normal. No scleral icterus.   Neck: No tracheal deviation present.   Cardiovascular: Normal rate, regular rhythm and intact distal pulses.   Pulmonary/Chest: Effort normal and breath sounds normal. No stridor. No respiratory distress.   Abdominal: Soft. He exhibits no distension. There is no tenderness.   Musculoskeletal: He exhibits tenderness. He exhibits no edema.   Tender to touch over left medial thigh  Drain in left thigh   Neurological: He is alert and oriented to person, place, and time. Coordination abnormal.   Skin: Skin is warm and dry. Rash noted. He is not diaphoretic. There is erythema.   Candida rash less red and smaller in size   Psychiatric: His behavior is normal. Thought content normal.   Nursing note and vitals reviewed.      Fluids    Intake/Output Summary (Last 24 hours) at 10/5/2019 1549  Last data filed at 10/5/2019 1530  Gross per 24 hour   Intake 920 ml   Output 2700 ml   Net -1780 ml       Laboratory  Recent Labs     10/03/19  0408 10/04/19  0417 10/05/19  0428   WBC 12.3* 12.9* 13.7*   RBC 3.66* 3.79* 3.69*   HEMOGLOBIN 9.7* 9.9* 9.9*   HEMATOCRIT 32.3* 32.8* 32.4*   MCV 88.3 86.5 87.8   MCH 26.5* 26.1* 26.8*   MCHC 30.0* 30.2* 30.6*   RDW 65.6* 63.9* 64.8*   PLATELETCT 345 337 351   MPV 8.7* 8.8* 8.3*     Recent Labs     10/03/19  0408 10/04/19  0417 10/05/19  0428   SODIUM 137 136 135   POTASSIUM 4.1 4.5 4.5   CHLORIDE 102 99 99   CO2 23 28 27   GLUCOSE 122* 90 88   BUN 10 11 12   CREATININE 0.91 0.79 0.89   CALCIUM 8.0* 8.5 8.3*                   Imaging  CT-DRAIN-HEMATOMA - SEROMA   Final Result      1.  Left inguinal region muscular abscess.   2.   CT-guided drainage and catheter placement of large collection in the left obturator externus.   3.  CT-guided aspiration of smaller collection in the left pectineus muscle.      US-RUQ   Final Result      Hepatomegaly.      Status post cholecystectomy.      Dilated portal vein.      Status post cholecystectomy.      No biliary ductal dilatation.      Limited evaluation of the pancreas which is obscured.      MR-THORACIC SPINE-W/O   Final Result      1.  No acute fracture in the thoracic spine.   2.  Mild chronic compression deformity at T11.      MR-LUMBAR SPINE-W/O   Final Result      1.  Acute bilateral sacral alae fractures.   2.  There is edema in the lower posterior paraspinal soft tissue likely representing soft tissue contusion.   3.  There is no lumbar spine fractures.         CT-LSPINE W/O PLUS RECONS   Final Result      No acute fracture or traumatic malalignment.      CT-PELVIS WITH   Final Result      1.  Recent but probably subacute fracture of the left superior pubic ramus near the symphysis. Adjacent and just lateral to this are two complex masslike structures, the largest and most medial of which contains a fluid-filled cavity. These are    consistent with hematomata, possibly intramuscular.      US-EXTREMITY VENOUS LOWER UNILAT LEFT   Final Result      1.  No evidence of Left  lower extremity deep venous thrombosis.   2.  Two complex areas within the left groin, consistent with hematomata.            INTERPRETING LOCATION: [address], [place], [address]           Assessment/Plan  * Pubic ramus fracture (HCC)  Assessment & Plan  S/p mechanical GLF  MS Continue 15mg BID continue with oxycodone, patient  Still needing iv morphine for thigh pain from infection  Rehab referral placed, will plan for transfer once pain controlled with only oral medication  Continue PT/OT  calcium and vit D for thin bones due to chronic prednisone use for rheumatoid arthritis    Candida rash of groin  Assessment &  Plan  Inproving, continue diflucan dose at 400mg daily and topical antifungal treatment  Monitor clinically    Sepsis (HCC)  Assessment & Plan  This is Sepsis Not present on admission  SIRS criteria identified on my evaluation include: Tachycardia, with heart rate greater than 90 BPM and Leukocyosis, with WBC greater than 12,000  Source is left thigh abscess  Leukocytosis worse, will monitor labs and ID is consulted          Abscess of left thigh  Assessment & Plan  Culture with MSSA and gram negative growth, continue unasyn  Drain in place will monitor output, 20mL out yesterday  Other fluid collection is small not needing drainage per Dr. Olguin, will treat with antibiotics  Will consult ID today due to patient having chills, new tenderness in left thigh and worsened leukocytosis  Will order MRI of pelvis and left thigh tomorrow to evaluate    Hematoma of left thigh  Assessment & Plan  Due to ground level fall,   Now with infected with abscess, drain in place      Bilateral leg edema  Assessment & Plan  Intermittently, with low albumin  Patient has normal liver contour, concern for poor absorption enterally, discussed nutrition needs with wife and patient    Iron deficiency anemia, unspecified iron deficiency anemia type- (present on admission)  Assessment & Plan  Anemia is stable, iv iron given last admission  No acute bleed      Essential hypertension- (present on admission)  Assessment & Plan  Well Controlled on Coreg, Losartan and lasix.     Chronic pain of right knee- (present on admission)  Assessment & Plan   Pain is much better controlled on MS contin + short acting oxycodone for breakthrough,   Continue lyrica  Patient continues to require iv morphine, will monitor closely for any respiratory depression or hypotension and taper as tolerated    Rheumatoid arthritis (HCC)- (present on admission)  Assessment & Plan  Continue Humira as outpatient once infection is cleared  Patient feels worse today with  chills and white cell count is higher despite antibiotics    VTE prophylaxis: lovenox

## 2019-10-05 NOTE — CARE PLAN
Problem: Communication  Goal: The ability to communicate needs accurately and effectively will improve  Outcome: PROGRESSING AS EXPECTED  Intervention: [place] patient and significant other/support system to call light to alert staff of needs  Flowsheets (Taken 10/5/2019 1220)  Oriented to:: All of the Following : Location of Bathroom, Visiting Policy, Unit Routine, Call Light and Bedside Controls, Bedside Rail Policy, Smoking Policy, Rights and Responsibilities, Bedside Report, and Patient Education Notebook  Note:   Pt reoriented to unit. Pt instructed to utilize call light to call for assistance as needed. Pt verbalized understanding, calls appropriately. Hourly rounding in place to ensure needs are met.       Problem: Knowledge Deficit  Goal: Knowledge of the prescribed therapeutic regimen will improve  Outcome: PROGRESSING AS EXPECTED  Intervention: Discuss information regarding therpeutic regimen and document in education  Note:   Pt education given re: prescribed antibiotics and prn pain medications. Pt verbalized understanding, education documented appropriately.

## 2019-10-06 ENCOUNTER — APPOINTMENT (OUTPATIENT)
Dept: RADIOLOGY | Facility: MEDICAL CENTER | Age: 44
DRG: 515 | End: 2019-10-06
Attending: INTERNAL MEDICINE
Payer: COMMERCIAL

## 2019-10-06 LAB
ANION GAP SERPL CALC-SCNC: 12 MMOL/L (ref 0–11.9)
BASOPHILS # BLD AUTO: 0.9 % (ref 0–1.8)
BASOPHILS # BLD: 0.11 K/UL (ref 0–0.12)
BUN SERPL-MCNC: 13 MG/DL (ref 8–22)
CALCIUM SERPL-MCNC: 8.1 MG/DL (ref 8.4–10.2)
CHLORIDE SERPL-SCNC: 101 MMOL/L (ref 96–112)
CO2 SERPL-SCNC: 23 MMOL/L (ref 20–33)
COMMENT 1642: NORMAL
CREAT SERPL-MCNC: 0.92 MG/DL (ref 0.5–1.4)
EOSINOPHIL # BLD AUTO: 0.11 K/UL (ref 0–0.51)
EOSINOPHIL NFR BLD: 0.9 % (ref 0–6.9)
ERYTHROCYTE [DISTWIDTH] IN BLOOD BY AUTOMATED COUNT: 65.6 FL (ref 35.9–50)
GLUCOSE SERPL-MCNC: 82 MG/DL (ref 65–99)
HCT VFR BLD AUTO: 29.2 % (ref 42–52)
HGB BLD-MCNC: 8.8 G/DL (ref 14–18)
IMM GRANULOCYTES # BLD AUTO: 1.12 K/UL (ref 0–0.11)
IMM GRANULOCYTES NFR BLD AUTO: 8.8 % (ref 0–0.9)
LYMPHOCYTES # BLD AUTO: 2.77 K/UL (ref 1–4.8)
LYMPHOCYTES NFR BLD: 21.9 % (ref 22–41)
MCH RBC QN AUTO: 26.5 PG (ref 27–33)
MCHC RBC AUTO-ENTMCNC: 30.1 G/DL (ref 33.7–35.3)
MCV RBC AUTO: 88 FL (ref 81.4–97.8)
MONOCYTES # BLD AUTO: 1.03 K/UL (ref 0–0.85)
MONOCYTES NFR BLD AUTO: 8.1 % (ref 0–13.4)
NEUTROPHILS # BLD AUTO: 7.52 K/UL (ref 1.82–7.42)
NEUTROPHILS NFR BLD: 59.4 % (ref 44–72)
NRBC # BLD AUTO: 0 K/UL
NRBC BLD-RTO: 0 /100 WBC
PLATELET # BLD AUTO: 348 K/UL (ref 164–446)
PMV BLD AUTO: 8.6 FL (ref 9–12.9)
POTASSIUM SERPL-SCNC: 4.2 MMOL/L (ref 3.6–5.5)
RBC # BLD AUTO: 3.32 M/UL (ref 4.7–6.1)
SODIUM SERPL-SCNC: 136 MMOL/L (ref 135–145)
WBC # BLD AUTO: 12.7 K/UL (ref 4.8–10.8)

## 2019-10-06 PROCEDURE — A9270 NON-COVERED ITEM OR SERVICE: HCPCS | Performed by: INTERNAL MEDICINE

## 2019-10-06 PROCEDURE — 99232 SBSQ HOSP IP/OBS MODERATE 35: CPT | Performed by: INTERNAL MEDICINE

## 2019-10-06 PROCEDURE — 700111 HCHG RX REV CODE 636 W/ 250 OVERRIDE (IP): Performed by: INTERNAL MEDICINE

## 2019-10-06 PROCEDURE — 73718 MRI LOWER EXTREMITY W/O DYE: CPT | Mod: LT

## 2019-10-06 PROCEDURE — 36415 COLL VENOUS BLD VENIPUNCTURE: CPT

## 2019-10-06 PROCEDURE — 770001 HCHG ROOM/CARE - MED/SURG/GYN PRIV*

## 2019-10-06 PROCEDURE — 700111 HCHG RX REV CODE 636 W/ 250 OVERRIDE (IP): Performed by: HOSPITALIST

## 2019-10-06 PROCEDURE — A9270 NON-COVERED ITEM OR SERVICE: HCPCS | Performed by: HOSPITALIST

## 2019-10-06 PROCEDURE — 700102 HCHG RX REV CODE 250 W/ 637 OVERRIDE(OP): Performed by: INTERNAL MEDICINE

## 2019-10-06 PROCEDURE — 72195 MRI PELVIS W/O DYE: CPT

## 2019-10-06 PROCEDURE — 99233 SBSQ HOSP IP/OBS HIGH 50: CPT | Performed by: INTERNAL MEDICINE

## 2019-10-06 PROCEDURE — 85025 COMPLETE CBC W/AUTO DIFF WBC: CPT

## 2019-10-06 PROCEDURE — 700105 HCHG RX REV CODE 258: Performed by: INTERNAL MEDICINE

## 2019-10-06 PROCEDURE — 80048 BASIC METABOLIC PNL TOTAL CA: CPT

## 2019-10-06 PROCEDURE — 700102 HCHG RX REV CODE 250 W/ 637 OVERRIDE(OP): Performed by: HOSPITALIST

## 2019-10-06 RX ADMIN — OXYCODONE HYDROCHLORIDE 5 MG: 5 TABLET ORAL at 10:48

## 2019-10-06 RX ADMIN — SENNOSIDES, DOCUSATE SODIUM 2 TABLET: 50; 8.6 TABLET, FILM COATED ORAL at 06:11

## 2019-10-06 RX ADMIN — AMPICILLIN AND SULBACTAM 3 G: 1; 2 INJECTION, POWDER, FOR SOLUTION INTRAMUSCULAR; INTRAVENOUS at 13:16

## 2019-10-06 RX ADMIN — MORPHINE SULFATE 4 MG: 4 INJECTION INTRAVENOUS at 17:38

## 2019-10-06 RX ADMIN — MORPHINE SULFATE 30 MG: 30 TABLET, EXTENDED RELEASE ORAL at 18:47

## 2019-10-06 RX ADMIN — OXYCODONE HYDROCHLORIDE 5 MG: 5 TABLET ORAL at 06:26

## 2019-10-06 RX ADMIN — NICOTINE 14 MG: 14 PATCH, EXTENDED RELEASE TRANSDERMAL at 06:12

## 2019-10-06 RX ADMIN — AMPICILLIN AND SULBACTAM 3 G: 1; 2 INJECTION, POWDER, FOR SOLUTION INTRAMUSCULAR; INTRAVENOUS at 23:47

## 2019-10-06 RX ADMIN — OMEPRAZOLE 40 MG: 20 CAPSULE, DELAYED RELEASE ORAL at 17:29

## 2019-10-06 RX ADMIN — SENNOSIDES, DOCUSATE SODIUM 2 TABLET: 50; 8.6 TABLET, FILM COATED ORAL at 17:29

## 2019-10-06 RX ADMIN — PREGABALIN 25 MG: 25 CAPSULE ORAL at 13:12

## 2019-10-06 RX ADMIN — OMEPRAZOLE 40 MG: 20 CAPSULE, DELAYED RELEASE ORAL at 06:09

## 2019-10-06 RX ADMIN — FERROUS SULFATE TAB 325 MG (65 MG ELEMENTAL FE) 325 MG: 325 (65 FE) TAB at 06:10

## 2019-10-06 RX ADMIN — FUROSEMIDE 20 MG: 40 TABLET ORAL at 06:12

## 2019-10-06 RX ADMIN — CALCIUM CITRATE 200 MG (950 MG) TABLET 950 MG: at 06:10

## 2019-10-06 RX ADMIN — MORPHINE SULFATE 4 MG: 4 INJECTION INTRAVENOUS at 03:40

## 2019-10-06 RX ADMIN — AMPICILLIN AND SULBACTAM 3 G: 1; 2 INJECTION, POWDER, FOR SOLUTION INTRAMUSCULAR; INTRAVENOUS at 17:29

## 2019-10-06 RX ADMIN — MORPHINE SULFATE 30 MG: 30 TABLET, EXTENDED RELEASE ORAL at 06:10

## 2019-10-06 RX ADMIN — POLYETHYLENE GLYCOL 3350 1 PACKET: 17 POWDER, FOR SOLUTION ORAL at 22:52

## 2019-10-06 RX ADMIN — PREGABALIN 25 MG: 25 CAPSULE ORAL at 06:10

## 2019-10-06 RX ADMIN — ENOXAPARIN SODIUM 40 MG: 100 INJECTION SUBCUTANEOUS at 06:14

## 2019-10-06 RX ADMIN — FUROSEMIDE 20 MG: 40 TABLET ORAL at 16:03

## 2019-10-06 RX ADMIN — MICONAZOLE NITRATE: 20 CREAM TOPICAL at 19:03

## 2019-10-06 RX ADMIN — FLUCONAZOLE 400 MG: 200 TABLET ORAL at 06:09

## 2019-10-06 RX ADMIN — MORPHINE SULFATE 4 MG: 4 INJECTION INTRAVENOUS at 08:01

## 2019-10-06 RX ADMIN — OXYCODONE HYDROCHLORIDE 5 MG: 5 TABLET ORAL at 20:11

## 2019-10-06 RX ADMIN — MORPHINE SULFATE 4 MG: 4 INJECTION INTRAVENOUS at 21:47

## 2019-10-06 RX ADMIN — MORPHINE SULFATE 4 MG: 4 INJECTION INTRAVENOUS at 13:12

## 2019-10-06 RX ADMIN — LOSARTAN POTASSIUM 25 MG: 25 TABLET, FILM COATED ORAL at 06:12

## 2019-10-06 RX ADMIN — PREGABALIN 25 MG: 25 CAPSULE ORAL at 17:29

## 2019-10-06 RX ADMIN — MICONAZOLE NITRATE: 20 CREAM TOPICAL at 06:16

## 2019-10-06 RX ADMIN — OXYCODONE HYDROCHLORIDE 5 MG: 5 TABLET ORAL at 14:54

## 2019-10-06 RX ADMIN — AMPICILLIN AND SULBACTAM 3 G: 1; 2 INJECTION, POWDER, FOR SOLUTION INTRAMUSCULAR; INTRAVENOUS at 06:06

## 2019-10-06 RX ADMIN — VITAMIN D, TAB 1000IU (100/BT) 1000 UNITS: 25 TAB at 06:11

## 2019-10-06 ASSESSMENT — ENCOUNTER SYMPTOMS
BACK PAIN: 0
NAUSEA: 0
SPUTUM PRODUCTION: 0
VOMITING: 0
SHORTNESS OF BREATH: 0
HEARTBURN: 0
ORTHOPNEA: 0
HEADACHES: 0
ABDOMINAL PAIN: 0
FEVER: 0
COUGH: 0
MYALGIAS: 1
DIARRHEA: 0
STRIDOR: 0
WEAKNESS: 0
CHILLS: 0
DIZZINESS: 0
CONSTIPATION: 0

## 2019-10-06 NOTE — PROGRESS NOTES
[facility] Medicine Daily Progress Note    Date of Service  10/6/2019    Chief Complaint  44 y.o. male admitted 9/27/2019 with left thigh pain    Hospital Course    44 y.o. male who presented to the emergency department on 9/27/2019 for evaluation of worsening left leg pain.  Patient had a fall about a month ago.  Patient has a problem with his right knee, on schedule for total knee replacement and ever since walking with a cane or walker. He had increasing pain in the left thigh and CT scan showed a left pubic ramus fracture with two large hematomas. Orthopedic surgery did see the patient and recommends no surgical intervention.  The patient had a rising white cell count and increasing pain so hematoma drainage was done by interventional radiology and tiffanie pus was drained consistent with an abscess. Cultures grew MSSA and drain was left in place.      Interval Problem Update  The patient continues to have left thigh pain, chills have resolved    Consultants/Specialty  Orthopedic surgery Dr. Olivares  Radiology Dr. Olguin    Code Status  full    Disposition  rehab    Review of Systems  Review of Systems   Constitutional: Positive for malaise/fatigue. Negative for chills and fever.        Night sweats   HENT: Negative for congestion.    Respiratory: Negative for cough, sputum production and stridor.    Cardiovascular: Negative for chest pain, orthopnea and leg swelling.   Gastrointestinal: Negative for abdominal pain, constipation, heartburn and nausea.   Genitourinary: Negative for dysuria, frequency and urgency.   Musculoskeletal: Positive for joint pain and myalgias. Negative for back pain.        Left thigh pain now more medially     Skin: Positive for rash. Negative for itching.        Rash over thighs improving   Neurological: Negative for dizziness, weakness and headaches.        Physical Exam  Temp:  [36.7 °C (98 °F)-36.9 °C (98.4 °F)] 36.9 °C (98.4 °F)  Pulse:  [71-90] 71  Resp:  [14-18] 16  BP:  (104-111)/(67-75) 111/73  SpO2:  [97 %-99 %] 97 %    Physical Exam   Constitutional: He is oriented to person, place, and time. No distress.   HENT:   Mouth/Throat: Oropharynx is clear and moist. No oropharyngeal exudate.   Eyes: Pupils are equal, round, and reactive to light. EOM are normal. No scleral icterus.   Neck: Neck supple. No tracheal deviation present.   Cardiovascular: Normal rate, regular rhythm and intact distal pulses.   Pulmonary/Chest: Effort normal and breath sounds normal. No stridor. No respiratory distress.   Abdominal: Soft. Bowel sounds are normal. He exhibits no distension. There is no tenderness.   Musculoskeletal: He exhibits tenderness. He exhibits no edema.   Tender to touch over left thigh  Drain in left thigh   Neurological: He is alert and oriented to person, place, and time. Coordination abnormal.   Difficulty bearing weight on left leg due to pain   Skin: Skin is dry. Rash noted. No erythema.   Candida rash resolving in groin   Psychiatric: His behavior is normal. Thought content normal.   Nursing note and vitals reviewed.      Fluids    Intake/Output Summary (Last 24 hours) at 10/6/2019 0739  Last data filed at 10/6/2019 0636  Gross per 24 hour   Intake 1260 ml   Output 3030 ml   Net -1770 ml       Laboratory  Recent Labs     10/04/19  0417 10/05/19  0428 10/06/19  0333   WBC 12.9* 13.7* 12.7*   RBC 3.79* 3.69* 3.32*   HEMOGLOBIN 9.9* 9.9* 8.8*   HEMATOCRIT 32.8* 32.4* 29.2*   MCV 86.5 87.8 88.0   MCH 26.1* 26.8* 26.5*   MCHC 30.2* 30.6* 30.1*   RDW 63.9* 64.8* 65.6*   PLATELETCT 337 351 348   MPV 8.8* 8.3* 8.6*     Recent Labs     10/04/19  0417 10/05/19  0428 10/06/19  0333   SODIUM 136 135 136   POTASSIUM 4.5 4.5 4.2   CHLORIDE 99 99 101   CO2 28 27 23   GLUCOSE 90 88 82   BUN 11 12 13   CREATININE 0.79 0.89 0.92   CALCIUM 8.5 8.3* 8.1*                   Imaging  CT-DRAIN-HEMATOMA - SEROMA   Final Result      1.  Left inguinal region muscular abscess.   2.  CT-guided drainage and  catheter placement of large collection in the left obturator externus.   3.  CT-guided aspiration of smaller collection in the left pectineus muscle.      US-RUQ   Final Result      Hepatomegaly.      Status post cholecystectomy.      Dilated portal vein.      Status post cholecystectomy.      No biliary ductal dilatation.      Limited evaluation of the pancreas which is obscured.      MR-THORACIC SPINE-W/O   Final Result      1.  No acute fracture in the thoracic spine.   2.  Mild chronic compression deformity at T11.      MR-LUMBAR SPINE-W/O   Final Result      1.  Acute bilateral sacral alae fractures.   2.  There is edema in the lower posterior paraspinal soft tissue likely representing soft tissue contusion.   3.  There is no lumbar spine fractures.         CT-LSPINE W/O PLUS RECONS   Final Result      No acute fracture or traumatic malalignment.      CT-PELVIS WITH   Final Result      1.  Recent but probably subacute fracture of the left superior pubic ramus near the symphysis. Adjacent and just lateral to this are two complex masslike structures, the largest and most medial of which contains a fluid-filled cavity. These are    consistent with hematomata, possibly intramuscular.      US-EXTREMITY VENOUS LOWER UNILAT LEFT   Final Result      1.  No evidence of Left  lower extremity deep venous thrombosis.   2.  Two complex areas within the left groin, consistent with hematomata.            INTERPRETING LOCATION: [address] [facility], [address]      MR-PELVIS W/O    (Results Pending)   MR-FEMUR-W/O LEFT    (Results Pending)        Assessment/Plan  * Pubic ramus fracture (HCC)  Assessment & Plan  S/p mechanical GLF  MS Continue 15mg BID continue with oxycodone, patient  Still needing iv morphine for thigh pain from infection  Rehab referral placed, will plan for transfer once pain controlled with only oral medication  Continue PT/OT  calcium and vit D for thin bones due to chronic prednisone use for rheumatoid  arthritis, patient has a pelvic fracture and lumbar spine compression fracture on imaging    Candida rash of groin  Assessment & Plan  Inproving, continue diflucan dose at 400mg daily and topical antifungal    Sepsis (HCC)  Assessment & Plan  This is Sepsis Not present on admission  SIRS criteria identified on my evaluation include: Tachycardia, with heart rate greater than 90 BPM and Leukocyosis, with WBC greater than 12,000  Source is left thigh abscess  Leukocytosis is stable, pain fluctuates, will obtain MRI to evaluate, discussed with ID          Abscess of left thigh  Assessment & Plan  Culture with MSSA and gram negative growth, continue unasyn  Drain in place will monitor output, 30mL out yesterday  Pain still severe  Other fluid collection is small not needing drainage per Dr. Olguin, will treat with antibiotics  ID consulted and agrees with MRI of pelvis and left femur to evaluate infection/abscess depth    Hematoma of left thigh  Assessment & Plan  Due to ground level fall,   Now with infected with abscess, drain in place      Bilateral leg edema  Assessment & Plan  Intermittently, with low albumin  Patient has normal liver contour, concern for poor absorption enterally, discussed nutrition needs with wife and patient    Iron deficiency anemia, unspecified iron deficiency anemia type- (present on admission)  Assessment & Plan  Anemia is stable, iv iron given last admission  No acute bleed      Essential hypertension- (present on admission)  Assessment & Plan  Well Controlled on Coreg, Losartan and lasix.     Chronic pain of right knee- (present on admission)  Assessment & Plan   Pain is much better controlled on MS contin + short acting oxycodone for breakthrough,   Continue lyrica  Patient continues to require iv morphine, will monitor closely for any respiratory depression or hypotension and taper    Rheumatoid arthritis (HCC)- (present on admission)  Assessment & Plan  Continue Humira as outpatient  once infection is cleared      VTE prophylaxis: lovenox

## 2019-10-06 NOTE — PROGRESS NOTES
Received report and assumed car of patient.  He is alert and oriented.  He reported pain worsening at that time.  Patient requested his oxy.  This was given.  Later requested IV morphine as oral pain medication did not help with the pain.  IV is patent and infusing.  BRAXTON in lt groin draining serosanguinous drainage.  Reviewed plan of care, answered questions, and hourly rounding in place.

## 2019-10-06 NOTE — PROGRESS NOTES
Report from JENNIFER Norris RN.     MRI Pending. Pt medicated per mar for Pain.     Pt to MRI, linens changed. Noted from Rehab stating concerns for morphine. Will discuss with pt.

## 2019-10-06 NOTE — CARE PLAN
Problem: Safety  Goal: Will remain free from falls  Outcome: PROGRESSING AS EXPECTED   Patient call light appropriate.  He is encouraged to call before getting out of bed.    Problem: Pain Management  Goal: Pain level will decrease to patient's comfort goal  Outcome: PROGRESSING SLOWER THAN EXPECTED   Patient still requiring IV pain medications.  Alternating oral with IV pain medications for relief.

## 2019-10-06 NOTE — PROGRESS NOTES
Infectious Disease Progress Note    Author: Jessica Goldman M.D. Date & Time of service: 10/6/2019  8:22 AM    Chief Complaint:  Follow-up for left thigh abscess    Interval History:  44 y.o. man with a history of rheumatoid arthritis on Humira, chronic right knee pain with plans for total right knee arthroplasty, and recent fall sustained 1 month prior to admission admitted for worsening left thigh/groin pain.  Patient found to have multiple fluid collections on imaging which were concerning for an abscess.  He underwent CT-guided drainage on 10/2 with cultures growing MSSA.    10/6 afebrile WBC 12.7 ongoing left groin pain however the crampy pain is somewhat decreased and he is able to ambulate better.  Plan for MRI of the pelvis and femur today    Labs Reviewed.    Review of Systems:  Review of Systems   Constitutional: Negative for chills and fever.   Respiratory: Negative for cough and shortness of breath.    Gastrointestinal: Negative for abdominal pain, diarrhea, nausea and vomiting.   Musculoskeletal: Positive for myalgias.   Skin: Positive for rash.   Neurological: Negative for dizziness and headaches.   All other systems reviewed and are negative.      Hemodynamics:  Temp (24hrs), Av.8 °C (98.2 °F), Min:36.7 °C (98 °F), Max:36.9 °C (98.4 °F)  Temperature: 36.9 °C (98.4 °F)  Pulse  Av.2  Min: 50  Max: 105   Blood Pressure: 111/73       Physical Exam:  Physical Exam   Constitutional: He is oriented to person, place, and time. He appears well-developed.   HENT:   Mouth/Throat: No oropharyngeal exudate.   Eyes: Pupils are equal, round, and reactive to light. Conjunctivae and EOM are normal.   Neck: Neck supple.   Cardiovascular: Normal rate, regular rhythm and normal heart sounds.   Pulmonary/Chest: Effort normal and breath sounds normal.   Abdominal: Soft.   Intertrigo in skin folds   Musculoskeletal: He exhibits tenderness. He exhibits no edema.   Left groin/thigh drain  + Tenderness to palpation  in left groin.  No edema   Neurological: He is alert and oriented to person, place, and time. No cranial nerve deficit.   Skin: Skin is warm and dry.   Psychiatric: He has a normal mood and affect. His behavior is normal.   Pleasant       Meds:    Current Facility-Administered Medications:   •  fluconazole  •  enoxaparin  •  miconazole  •  ampicillin-sulbactam (UNASYN) IV  •  vitamin D  •  calcium citrate  •  oxyCODONE immediate-release  •  morphine ER  •  ferrous sulfate  •  furosemide  •  losartan  •  omeprazole  •  pregabalin  •  senna-docusate **AND** polyethylene glycol/lytes **AND** magnesium hydroxide **AND** bisacodyl  •  acetaminophen  •  nicotine **AND** Nicotine Replacement Patient Education Materials **AND** nicotine polacrilex  •  morphine injection    Labs:  Recent Labs     10/04/19  0417 10/05/19  0428 10/06/19  0333   WBC 12.9* 13.7* 12.7*   RBC 3.79* 3.69* 3.32*   HEMOGLOBIN 9.9* 9.9* 8.8*   HEMATOCRIT 32.8* 32.4* 29.2*   MCV 86.5 87.8 88.0   MCH 26.1* 26.8* 26.5*   RDW 63.9* 64.8* 65.6*   PLATELETCT 337 351 348   MPV 8.8* 8.3* 8.6*   NEUTSPOLYS 63.00 67.00 59.40   LYMPHOCYTES 26.00 18.00* 21.90*   MONOCYTES 8.00 9.00 8.10   EOSINOPHILS 0.00 1.00 0.90   BASOPHILS 1.00 1.00 0.90   RBCMORPHOLO Present Present  --      Recent Labs     10/04/19  0417 10/05/19  0428 10/06/19  0333   SODIUM 136 135 136   POTASSIUM 4.5 4.5 4.2   CHLORIDE 99 99 101   CO2 28 27 23   GLUCOSE 90 88 82   BUN 11 12 13     Recent Labs     10/04/19  0417 10/05/19  0428 10/06/19  0333   CREATININE 0.79 0.89 0.92       Imaging:  Ct-drain-hematoma - Seroma    Result Date: 10/2/2019  10/2/2019 12:14 PM HISTORY/REASON FOR EXAM:  Left inguinal fluid collection. Moderate sedation was administered with continuous monitoring of the patient under the direct supervision of  Total sedation time: 30 minutes The biopsy/drainage was done utilizing low dose optimization CT techniques including auto modulation for  imaging and  low mA CT/fluoroscopy mode for the procedure. PROCEDURE: After the informed consent the patient was placed on the CT table on supine position. The skin over the left groin was prepped. Localization CT scan demonstrates fluid collection in the left obturator externus and pectineus muscles. Subsequently after injecting lidocaine needle was advanced into the larger fluid collection. Pus was aspirated. Subsequently a wire was introduced. After dilating the tract an 8 [place] drain was introduced and placed with its loop in the fluid collection. An approximately 100 mL of fluid was aspirated. Subsequently under the needle was advanced into the superficial/pectineus muscle fluid collection. An approximately 10 mL of fluid was aspirated. No drain was placed in the superficial fluid collection. A sample of the material was sent to the microbe allergy. The patient tolerated the procedure without any immediate complication.     1.  Left inguinal region muscular abscess. 2.  CT-guided drainage and catheter placement of large collection in the left obturator externus. 3.  CT-guided aspiration of smaller collection in the left pectineus muscle.    Ct-lspine W/o Plus Recons    Result Date: 9/28/2019 9/28/2019 4:58 AM HISTORY/REASON FOR EXAM: Back pain after recent fall TECHNIQUE/EXAM DESCRIPTION: CT lumbar spine without contrast, with reconstructions. Noncontrast helical scanning was obtained from T12 through the sacrum.  Sagittal reconstructions were generated from the axial images. Low dose optimization technique was utilized for this CT exam including automated exposure control and adjustment of the mA and/or kV according to patient size. COMPARISON:  None. FINDINGS: The bones are markedly osteopenic. There is a minimal compression deformity of the superior endplate of the L2 body, which is old. There is no evidence of acute fracture. There is multilevel interspace and facet arthropathy.     No acute fracture or traumatic  malalignment.    Ct-pelvis With    Result Date: 9/28/2019 9/28/2019 3:21 AM HISTORY/REASON FOR EXAM:  significant left groin pain, possible hematoma on us. TECHNIQUE/EXAM DESCRIPTION AND NUMBER OF VIEWS: CT scan of the pelvis with contrast. Contrast-enhanced helical scanning of the pelvis was obtained from the iliac crests through the pubic symphysis following the bolus administration of 100 mL of Omnipaque 350 nonionic contrast without complication. Low dose optimization technique was utilized for this CT exam including automated exposure control and adjustment of the mA and/or kV according to patient size. COMPARISON:  None. FINDINGS: In the deep left inguinal region, there is a masslike structure containing irregular fluid-filled cavity, which measures overall about 8.6 cm in longest dimension. Within the cavity or focal areas of increased attenuation consistent with calcification. This lesion is immediately adjacent to a recent no probably subacute fracture of the left superior pubic ramus near the symphysis. Lateral to this lesion is a smaller complex mass measuring about 7.3 cm maximum dimension. There is no abnormality within the visualized pelvic cavity and there is no free intraperitoneal fluid. The bladder is intact.     1.  Recent but probably subacute fracture of the left superior pubic ramus near the symphysis. Adjacent and just lateral to this are two complex masslike structures, the largest and most medial of which contains a fluid-filled cavity. These are consistent with hematomata, possibly intramuscular.    Dx-lumbar Spine-2 Or 3 Views    Result Date: 9/16/2019 9/16/2019 4:15 PM HISTORY/REASON FOR EXAM:  Atraumatic Pain TECHNIQUE/ EXAM DESCRIPTION AND NUMBER OF VIEWS:  3 views of the lumbar spine. COMPARISON: None. FINDINGS: Dextroconvex lumbar curvature. Evaluation of the sacrum is limited due to overlying bowel content. No acute fracture. No malalignment. No compression deformity. Mild to  moderate degenerative change of the lumbar spine.     Dextroconvex lumbar curvature. No acute osseous abnormality. No malalignment.    Dx-thoracic Spine-with Swimmers View    Result Date: 9/16/2019 9/16/2019 4:15 PM HISTORY/REASON FOR EXAM:  Atraumatic Pain; ?scoliotic MId back pain and low back pain after GLF x 2 weeks TECHNIQUE/EXAM DESCRIPTION AND NUMBER OF VIEWS:  Thoracic spine, 3 views. COMPARISON:  None. FINDINGS: Diffuse osseous demineralization, limiting evaluation for nondisplaced fracture. No acute fracture. No malalignment. Mild chronic height loss of the mid thoracic spine from T9 to T11. Mild degenerative change of the thoracic spine.     Mild chronic height loss of the mid thoracic spine from T9 to T11.    Mr-lumbar Spine-w/o    Result Date: 9/28/2019 9/28/2019 6:21 PM HISTORY/REASON FOR EXAM:  back pain. TECHNIQUE/EXAM DESCRIPTION: MRI of the lumbar spine without contrast. The study was performed on a [employer] Signa 1.5 Mae MRI scanner. T1 sagittal, T2 fast spin-echo sagittal, and T2 axial images were obtained of the lumbar spine. COMPARISON:  None. FINDINGS: There are bilateral sacral alar fractures. The lumbar spine maintains normal height and alignment. At the level of L5-S1, there is no spinal or neural foraminal stenosis. At the level of L4-5, there is no spinal or neural foraminal stenosis. At the level of L3-4, there is no spinal or neural foraminal stenosis. At the level of the L2-3, there is no spinal or neural foraminal stenosis. At the level of L1-2, there is no spinal or neural foraminal stenosis. The conus terminates at the level of L1. The visualized lower thoracic spinal cord appear normal. There is edema in the posterior paraspinal soft tissue likely representing soft tissue contusion.     1.  Acute bilateral sacral alae fractures. 2.  There is edema in the lower posterior paraspinal soft tissue likely representing soft tissue contusion. 3.  There is no lumbar spine fractures.      Mr-thoracic Spine-w/o    Result Date: 9/28/2019 9/28/2019 6:21 PM HISTORY/REASON FOR EXAM:  back pain TECHNIQUE/EXAM DESCRIPTION: MRI of the thoracic spine without contrast. The study was performed on a [employer] Signa 1.5 Mae MRI scanner. T1 sagittal, T2 fast spin-echo sagittal, and T2 axial images were obtained of the thoracic spine. COMPARISON: None. FINDINGS: There is mild chronic compression deformity at T11. There is no fracture or dislocation. There is no pathologic marrow infiltration. There is no intradural extramedullary lesion. There is no abnormal intramedullary T2 signal intensity in the thoracic spinal cord.     1.  No acute fracture in the thoracic spine. 2.  Mild chronic compression deformity at T11.    Us-extremity Venous Lower Unilat Left    Result Date: 9/28/2019  HISTORY/REASON FOR EXAM:  Leg swelling after fall TECHNIQUE/EXAM DESCRIPTION: Left lower extremity doppler venous ultrasound was performed. Using both color and pulse-wave Doppler imaging, multiple images were obtained from the common femoral vein origins distally through the popliteal trifurcations.  9/28/2019 12:54 AM COMPARISON:  None. FINDINGS: REAL-TIME GRAY-SCALE IMAGING: Real-time gray-scale imaging reveals no evidence of focal wall thickening. COLOR AND DUPLEX DOPPLER IMAGING: Graded compression was used to demonstrate patent lumens.  There is no evidence for luminal thrombus.  There is normal response to augmentation and respiratory variation. There are two rounded complex areas in the left groin region, the largest measuring about 10.9 cm diameter and the smaller, about 8.3 cm in diameter, both of which are consistent with hematomata.     1.  No evidence of Left  lower extremity deep venous thrombosis. 2.  Two complex areas within the left groin, consistent with hematomata. INTERPRETING LOCATION: [address] [facility] [place], [address]    Us-ruq    Result Date: 10/1/2019  10/1/2019 5:49 PM HISTORY/REASON FOR EXAM:  Edema Abdominal pain  TECHNIQUE/EXAM DESCRIPTION AND NUMBER OF VIEWS:  Real-time sonography of the liver and biliary tree. COMPARISON: None FINDINGS: The liver is normal in contour. There is no evidence of solid mass lesion. The liver measures 20.15 cm. The gallbladder is surgically absent. The common duct measures 0.44 cm. The pancreas is obscured by bowel gas. The visualized aorta is normal in caliber. Intrahepatic IVC is patent. The portal vein is patent with hepatopetal flow. The MPV measures 1.57 cm. The right kidney measures 11.06 cm. There is no hydronephrosis. There is no ascites.     Hepatomegaly. Status post cholecystectomy. Dilated portal vein. Status post cholecystectomy. No biliary ductal dilatation. Limited evaluation of the pancreas which is obscured.      Micro:  Results     Procedure Component Value Units Date/Time    Anaerobic Culture [[id number]] Collected:  10/02/19 1330    Order Status:  Completed Specimen:  Wound Updated:  10/05/19 1019     Significant Indicator NEG     Source WND     Site Left Thigh Abscess     Culture Result No Anaerobes isolated.    Narrative:       Radiology specimen    CULTURE WOUND W/ GRAM STAIN [[id number]]  (Abnormal)  (Susceptibility) Collected:  10/02/19 1330    Order Status:  Completed Specimen:  Wound Updated:  10/05/19 1019     Significant Indicator POS     Source WND     Site Left Thigh Abscess     Culture Result -     Gram Stain Result Many WBCs.  Moderate Gram positive cocci.  Rare Gram negative rods.       Culture Result Staphylococcus aureus  Moderate growth      Narrative:       Radiology specimen    Susceptibility     Staphylococcus aureus (1)     Antibiotic Interpretation Microscan Method Status    Clindamycin Sensitive <=0.5 mcg/mL MARÍA Final    Daptomycin Sensitive <=0.5 mcg/mL MARÍA Final    Erythromycin Sensitive <=0.5 mcg/mL MARÍA Final    Ampicillin/sulbactam Sensitive <=8/4 mcg/mL MARÍA Final    Moxifloxacin Intermediate 4 mcg/mL MARÍA Final    Vancomycin Sensitive 1 mcg/mL MARÍA  "Final    Oxacillin Sensitive <=0.25 mcg/mL MARÍA Final    Trimeth/Sulfa Sensitive <=0.5/9.5 mcg/mL MARÍA Final    Tetracycline Sensitive <=4 mcg/mL MARÍA Final                   Blood Culture [[id number]] Collected:  10/02/19 1541    Order Status:  Completed Specimen:  Blood from Peripheral Updated:  10/03/19 0730     Significant Indicator NEG     Source BLD     Site Peripheral     Culture Result No Growth  Note: Blood cultures are incubated for 5 days and  are monitored continuously.Positive blood cultures  are called to the RN and reported as soon as  they are identified.  Blood culture testing and Gram stain, if indicated, are  performed at [facility], [address]  [address], [place].  Positive blood cultures are  sent to [facility], [address] [place] [address], for organism identification and  susceptibility testing.      Narrative:       From different peripheral sites, if not done within the last  24 hours (Per Hospital Policy: Only change specimen source to  \"Line\" if specified by physician order)  Left Hand    Blood Culture [[id number]] Collected:  10/02/19 1540    Order Status:  Completed Specimen:  Blood from Peripheral Updated:  10/03/19 0730     Significant Indicator NEG     Source BLD     Site Peripheral     Culture Result No Growth  Note: Blood cultures are incubated for 5 days and  are monitored continuously.Positive blood cultures  are called to the RN and reported as soon as  they are identified.  Blood culture testing and Gram stain, if indicated, are  performed at [facility], [address]  [address], [place].  Positive blood cultures are  sent to [facility], [address] [place] [address], for organism identification and  susceptibility testing.      Narrative:       From different peripheral sites, if not done within the last  24 hours (Per Hospital Policy: Only change specimen source to  \"Line\" if " specified by physician order)  Right AC    GRAM STAIN [[id number]] Collected:  10/02/19 1330    Order Status:  Completed Specimen:  Wound Updated:  10/03/19 0107     Significant Indicator .     Source WND     Site Left Thigh Abscess     Gram Stain Result Many WBCs.  Moderate Gram positive cocci.  Rare Gram negative rods.      Narrative:       Radiology specimen    FLUID CULTURE W/GRAM STAIN [[id number]] Collected:  10/02/19 1330    Order Status:  Canceled Specimen:  Other     FLUID CULTURE [[id number]]     Order Status:  No result Specimen:  Other Body Fluid           Assessment:  Active Hospital Problems    Diagnosis   • *Pubic ramus fracture ([facility]) [S32.599A]   • Essential hypertension [I10]   • Rheumatoid arthritis ([facility]) [M06.9]   • Chronic pain of right knee [M25.561, G89.29]   • Candida rash of groin [B37.89]   • Abscess of left thigh [L02.416]   • Sepsis ([facility]) [A41.9]   • Bilateral leg edema [R60.0]   • Hematoma of left thigh [S70.12XA]   • Iron deficiency anemia, unspecified iron deficiency anemia type [D50.9]       Plan:  Abscess of left thigh-ongoing work-up  No fevers  Persistent leukocytosis  Sustained recent fall 1 month prior to admission  Blood cultures-negative to date  Status post CT-guided drainage on 10/2  Cultures+ MSSA.  Gram stain+ GNR, GPC  Continue IV Unasyn  Plan for MRI of the pelvis and femur today given persistent leukocytosis  Duration of antibiotics will depend on results of repeat imaging and clinical improvement  However anticipate a 2-week course of antibiotics    Intertrigo  Continue p.o. fluconazole 400 mg daily    Rheumatoid arthritis  On Humira prior to admission  Chronically immunosuppressed    Chronic right knee pain  Future plan for total right knee arthroplasty    Plan of care discussed with internal medicine/Dr. Varma

## 2019-10-07 ENCOUNTER — APPOINTMENT (OUTPATIENT)
Dept: RADIOLOGY | Facility: MEDICAL CENTER | Age: 44
DRG: 515 | End: 2019-10-07
Attending: INTERNAL MEDICINE
Payer: COMMERCIAL

## 2019-10-07 PROBLEM — M86.9 OSTEOMYELITIS (HCC): Status: ACTIVE | Noted: 2019-10-07

## 2019-10-07 LAB
ANION GAP SERPL CALC-SCNC: 10 MMOL/L (ref 0–11.9)
BACTERIA BLD CULT: NORMAL
BACTERIA BLD CULT: NORMAL
BASOPHILS # BLD AUTO: 1.2 % (ref 0–1.8)
BASOPHILS # BLD: 0.14 K/UL (ref 0–0.12)
BUN SERPL-MCNC: 13 MG/DL (ref 8–22)
CALCIUM SERPL-MCNC: 8.2 MG/DL (ref 8.4–10.2)
CHLORIDE SERPL-SCNC: 97 MMOL/L (ref 96–112)
CO2 SERPL-SCNC: 25 MMOL/L (ref 20–33)
CREAT SERPL-MCNC: 0.69 MG/DL (ref 0.5–1.4)
EOSINOPHIL # BLD AUTO: 0.13 K/UL (ref 0–0.51)
EOSINOPHIL NFR BLD: 1.1 % (ref 0–6.9)
ERYTHROCYTE [DISTWIDTH] IN BLOOD BY AUTOMATED COUNT: 64.7 FL (ref 35.9–50)
GLUCOSE SERPL-MCNC: 77 MG/DL (ref 65–99)
HCT VFR BLD AUTO: 30.5 % (ref 42–52)
HGB BLD-MCNC: 9 G/DL (ref 14–18)
IMM GRANULOCYTES # BLD AUTO: 0.91 K/UL (ref 0–0.11)
IMM GRANULOCYTES NFR BLD AUTO: 8 % (ref 0–0.9)
LYMPHOCYTES # BLD AUTO: 2.9 K/UL (ref 1–4.8)
LYMPHOCYTES NFR BLD: 25.3 % (ref 22–41)
MCH RBC QN AUTO: 26 PG (ref 27–33)
MCHC RBC AUTO-ENTMCNC: 29.5 G/DL (ref 33.7–35.3)
MCV RBC AUTO: 88.2 FL (ref 81.4–97.8)
MONOCYTES # BLD AUTO: 1.06 K/UL (ref 0–0.85)
MONOCYTES NFR BLD AUTO: 9.3 % (ref 0–13.4)
NEUTROPHILS # BLD AUTO: 6.3 K/UL (ref 1.82–7.42)
NEUTROPHILS NFR BLD: 55.1 % (ref 44–72)
NRBC # BLD AUTO: 0 K/UL
NRBC BLD-RTO: 0 /100 WBC
PLATELET # BLD AUTO: 351 K/UL (ref 164–446)
PMV BLD AUTO: 8.3 FL (ref 9–12.9)
POTASSIUM SERPL-SCNC: 4.1 MMOL/L (ref 3.6–5.5)
RBC # BLD AUTO: 3.46 M/UL (ref 4.7–6.1)
SIGNIFICANT IND 70042: NORMAL
SIGNIFICANT IND 70042: NORMAL
SITE SITE: NORMAL
SITE SITE: NORMAL
SODIUM SERPL-SCNC: 132 MMOL/L (ref 135–145)
SOURCE SOURCE: NORMAL
SOURCE SOURCE: NORMAL
WBC # BLD AUTO: 11.4 K/UL (ref 4.8–10.8)

## 2019-10-07 PROCEDURE — 700105 HCHG RX REV CODE 258: Performed by: INTERNAL MEDICINE

## 2019-10-07 PROCEDURE — 99233 SBSQ HOSP IP/OBS HIGH 50: CPT | Performed by: INTERNAL MEDICINE

## 2019-10-07 PROCEDURE — 97535 SELF CARE MNGMENT TRAINING: CPT

## 2019-10-07 PROCEDURE — A9270 NON-COVERED ITEM OR SERVICE: HCPCS | Performed by: INTERNAL MEDICINE

## 2019-10-07 PROCEDURE — 700102 HCHG RX REV CODE 250 W/ 637 OVERRIDE(OP): Performed by: HOSPITALIST

## 2019-10-07 PROCEDURE — 76882 US LMTD JT/FCL EVL NVASC XTR: CPT | Mod: RT

## 2019-10-07 PROCEDURE — 700111 HCHG RX REV CODE 636 W/ 250 OVERRIDE (IP): Performed by: INTERNAL MEDICINE

## 2019-10-07 PROCEDURE — 700117 HCHG RX CONTRAST REV CODE 255: Performed by: INTERNAL MEDICINE

## 2019-10-07 PROCEDURE — 80048 BASIC METABOLIC PNL TOTAL CA: CPT

## 2019-10-07 PROCEDURE — A9270 NON-COVERED ITEM OR SERVICE: HCPCS | Performed by: HOSPITALIST

## 2019-10-07 PROCEDURE — 85025 COMPLETE CBC W/AUTO DIFF WBC: CPT

## 2019-10-07 PROCEDURE — 74177 CT ABD & PELVIS W/CONTRAST: CPT

## 2019-10-07 PROCEDURE — 770001 HCHG ROOM/CARE - MED/SURG/GYN PRIV*

## 2019-10-07 PROCEDURE — 36415 COLL VENOUS BLD VENIPUNCTURE: CPT

## 2019-10-07 PROCEDURE — 97116 GAIT TRAINING THERAPY: CPT

## 2019-10-07 PROCEDURE — 700102 HCHG RX REV CODE 250 W/ 637 OVERRIDE(OP): Performed by: INTERNAL MEDICINE

## 2019-10-07 PROCEDURE — 97530 THERAPEUTIC ACTIVITIES: CPT

## 2019-10-07 PROCEDURE — 700111 HCHG RX REV CODE 636 W/ 250 OVERRIDE (IP): Performed by: HOSPITALIST

## 2019-10-07 RX ORDER — OXYCODONE HYDROCHLORIDE 10 MG/1
10 TABLET ORAL EVERY 4 HOURS PRN
Status: DISCONTINUED | OUTPATIENT
Start: 2019-10-07 | End: 2019-10-17 | Stop reason: HOSPADM

## 2019-10-07 RX ADMIN — OMEPRAZOLE 40 MG: 20 CAPSULE, DELAYED RELEASE ORAL at 17:53

## 2019-10-07 RX ADMIN — FUROSEMIDE 20 MG: 40 TABLET ORAL at 17:54

## 2019-10-07 RX ADMIN — ENOXAPARIN SODIUM 40 MG: 100 INJECTION SUBCUTANEOUS at 05:10

## 2019-10-07 RX ADMIN — MORPHINE SULFATE 4 MG: 4 INJECTION INTRAVENOUS at 14:42

## 2019-10-07 RX ADMIN — MORPHINE SULFATE 30 MG: 30 TABLET, EXTENDED RELEASE ORAL at 05:15

## 2019-10-07 RX ADMIN — AMPICILLIN AND SULBACTAM 3 G: 1; 2 INJECTION, POWDER, FOR SOLUTION INTRAMUSCULAR; INTRAVENOUS at 17:53

## 2019-10-07 RX ADMIN — PREGABALIN 25 MG: 25 CAPSULE ORAL at 17:53

## 2019-10-07 RX ADMIN — FERROUS SULFATE TAB 325 MG (65 MG ELEMENTAL FE) 325 MG: 325 (65 FE) TAB at 05:14

## 2019-10-07 RX ADMIN — VITAMIN D, TAB 1000IU (100/BT) 1000 UNITS: 25 TAB at 05:13

## 2019-10-07 RX ADMIN — NICOTINE 14 MG: 14 PATCH, EXTENDED RELEASE TRANSDERMAL at 05:11

## 2019-10-07 RX ADMIN — SENNOSIDES, DOCUSATE SODIUM 2 TABLET: 50; 8.6 TABLET, FILM COATED ORAL at 17:53

## 2019-10-07 RX ADMIN — AMPICILLIN AND SULBACTAM 3 G: 1; 2 INJECTION, POWDER, FOR SOLUTION INTRAMUSCULAR; INTRAVENOUS at 12:18

## 2019-10-07 RX ADMIN — CALCIUM CITRATE 200 MG (950 MG) TABLET 950 MG: at 05:13

## 2019-10-07 RX ADMIN — OXYCODONE HYDROCHLORIDE 10 MG: 10 TABLET ORAL at 21:01

## 2019-10-07 RX ADMIN — AMPICILLIN AND SULBACTAM 3 G: 1; 2 INJECTION, POWDER, FOR SOLUTION INTRAMUSCULAR; INTRAVENOUS at 05:09

## 2019-10-07 RX ADMIN — OXYCODONE HYDROCHLORIDE 5 MG: 5 TABLET ORAL at 01:19

## 2019-10-07 RX ADMIN — OMEPRAZOLE 40 MG: 20 CAPSULE, DELAYED RELEASE ORAL at 05:15

## 2019-10-07 RX ADMIN — OXYCODONE HYDROCHLORIDE 10 MG: 10 TABLET ORAL at 12:18

## 2019-10-07 RX ADMIN — MORPHINE SULFATE 4 MG: 4 INJECTION INTRAVENOUS at 23:22

## 2019-10-07 RX ADMIN — MORPHINE SULFATE 4 MG: 4 INJECTION INTRAVENOUS at 02:36

## 2019-10-07 RX ADMIN — IOHEXOL 100 ML: 350 INJECTION, SOLUTION INTRAVENOUS at 17:03

## 2019-10-07 RX ADMIN — PREGABALIN 25 MG: 25 CAPSULE ORAL at 12:18

## 2019-10-07 RX ADMIN — MORPHINE SULFATE 30 MG: 30 TABLET, EXTENDED RELEASE ORAL at 17:53

## 2019-10-07 RX ADMIN — SENNOSIDES, DOCUSATE SODIUM 2 TABLET: 50; 8.6 TABLET, FILM COATED ORAL at 05:14

## 2019-10-07 RX ADMIN — PREGABALIN 25 MG: 25 CAPSULE ORAL at 05:13

## 2019-10-07 RX ADMIN — MICONAZOLE NITRATE: 20 CREAM TOPICAL at 17:54

## 2019-10-07 RX ADMIN — MORPHINE SULFATE 4 MG: 4 INJECTION INTRAVENOUS at 09:20

## 2019-10-07 RX ADMIN — MORPHINE SULFATE 4 MG: 4 INJECTION INTRAVENOUS at 19:19

## 2019-10-07 RX ADMIN — MICONAZOLE NITRATE: 20 CREAM TOPICAL at 05:18

## 2019-10-07 RX ADMIN — OXYCODONE HYDROCHLORIDE 10 MG: 10 TABLET ORAL at 08:19

## 2019-10-07 RX ADMIN — MAGNESIUM HYDROXIDE 30 ML: 400 SUSPENSION ORAL at 09:18

## 2019-10-07 RX ADMIN — FLUCONAZOLE 400 MG: 200 TABLET ORAL at 05:12

## 2019-10-07 RX ADMIN — OXYCODONE HYDROCHLORIDE 10 MG: 10 TABLET ORAL at 16:38

## 2019-10-07 ASSESSMENT — ENCOUNTER SYMPTOMS
SORE THROAT: 0
NAUSEA: 0
CHILLS: 0
ORTHOPNEA: 0
ABDOMINAL PAIN: 0
HEARTBURN: 0
COUGH: 0
DIARRHEA: 0
BACK PAIN: 0
SHORTNESS OF BREATH: 0
HEADACHES: 0
NERVOUS/ANXIOUS: 1
VOMITING: 0
SPUTUM PRODUCTION: 0
STRIDOR: 0
WEAKNESS: 0
MYALGIAS: 1
CONSTIPATION: 0
DIZZINESS: 0
FEVER: 0

## 2019-10-07 ASSESSMENT — PAIN SCALES - WONG BAKER
WONGBAKER_NUMERICALRESPONSE: HURTS A LITTLE MORE
WONGBAKER_NUMERICALRESPONSE: HURTS EVEN MORE
WONGBAKER_NUMERICALRESPONSE: HURTS A LITTLE MORE

## 2019-10-07 ASSESSMENT — GAIT ASSESSMENTS
ASSISTIVE DEVICE: FRONT WHEEL WALKER
GAIT LEVEL OF ASSIST: MINIMAL ASSIST
DEVIATION: ANTALGIC;SHUFFLED GAIT;STEP TO
DISTANCE (FEET): 30

## 2019-10-07 NOTE — THERAPY
"Occupational Therapy Treatment completed with focus on ADLs and upper extremity function.  Functional Status:  Pt UIC, c/o fatigue and 6/10 left thigh pain. Seated grooming with Sup. Gown change with Sup. Urinal use- Sup. UB strength- WFL. Tolerates UIC post session.  Plan of Care: Will benefit from Occupational Therapy 3 times per week  Discharge Recommendations:  Equipment No Equipment Needed. Post-acute therapy- TBD.  See \"Rehab Therapy-Acute\" Patient Summary Report for complete documentation.   "

## 2019-10-07 NOTE — PROGRESS NOTES
Report received from Day RN and assumed care of patient.  He is alert and oriented.  Family at the bedside.  IV morphine and oxy alternated this shift.  IV patent and infusing antibiotics when due.  BRAXTON draining serosanguinous drainage.  Patient has a low appetite.  Reviewed plan of care, questions answered, and hourly rounding in place.

## 2019-10-07 NOTE — THERAPY
"Physical Therapy  treatment completed.   Bed Mobility:  Supine to Sit: Supervised  Transfers: Sit to Stand: Minimal Assist(close guarding for safety)  Gait: Level Of Assist: Minimal Assist(close guarding for safety) with Front-Wheel Walker x 30 ft     Plan of Care: Will benefit from Physical Therapy 5 times per week  Discharge Recommendations: Equipment: Will Continue to Assess for Equipment Needs. Post-acute therapy Discharge to home with home health for additional skilled therapy services vs transitional care facility pending progress.     Pt is very motivated to recover however is experiencing slow progress, pt continues to be limited by L groin/LE pain movement and WB, highly reliant on B UE for support when ambulating. Pt reports R knee RA is worsening with increased pain.    See \"Rehab Therapy-Acute\" Patient Summary Report for complete documentation.     "

## 2019-10-07 NOTE — PROGRESS NOTES
Report from JENNIFER Norris. Pt awake and alert.     Pt with 5.5/10 pain, states he would like to try MOM today due to still feeling bloated after miralax.     Pt medicated with 10 oxy.

## 2019-10-07 NOTE — PROGRESS NOTES
[facility] Medicine Daily Progress Note    Date of Service  10/7/2019    Chief Complaint  44 y.o. male admitted 9/27/2019 with left thigh pain    Hospital Course    44 y.o. male who presented to the emergency department on 9/27/2019 for evaluation of worsening left leg pain.  Patient had a fall about a month ago.  Patient has a problem with his right knee, on schedule for total knee replacement and ever since walking with a cane or walker. He had increasing pain in the left thigh and CT scan showed a left pubic ramus fracture with two large hematomas. Orthopedic surgery did see the patient and recommends no surgical intervention.  The patient had a rising white cell count and increasing pain so hematoma drainage was done by interventional radiology and tiffanie pus was drained consistent with an abscess. Cultures grew MSSA and drain was left in place.      Interval Problem Update  The patient continues to have left thigh pain mostly in the medial aspect  He continues to require iv morphine    Consultants/Specialty  Orthopedic surgery Dr. Olivares  Radiology Dr. Olguin    Code Status  full    Disposition  rehab    Review of Systems  Review of Systems   Constitutional: Positive for malaise/fatigue. Negative for fever.   HENT: Negative for sore throat.    Respiratory: Negative for cough, sputum production and stridor.    Cardiovascular: Negative for chest pain, orthopnea and leg swelling.   Gastrointestinal: Negative for abdominal pain, constipation and heartburn.   Genitourinary: Negative for dysuria, frequency and urgency.   Musculoskeletal: Positive for joint pain and myalgias. Negative for back pain.        Left thigh pain medially over adductor muscle  Chronic right knee pain   Skin: Positive for rash. Negative for itching.        Rash over thighs improving, skin is pealing   Neurological: Negative for dizziness, weakness and headaches.        Physical Exam  Temp:  [36.6 °C (97.8 °F)-36.8 °C (98.3 °F)] 36.6 °C (97.8  °F)  Pulse:  [74-91] 87  Resp:  [14-18] 14  BP: ()/(55-74) 99/55  SpO2:  [96 %-100 %] 97 %    Physical Exam   Constitutional: He is oriented to person, place, and time. No distress.   HENT:   Mouth/Throat: No oropharyngeal exudate.   Eyes: Pupils are equal, round, and reactive to light. EOM are normal. No scleral icterus.   Neck: Neck supple. No tracheal deviation present.   Cardiovascular: Normal rate, regular rhythm and intact distal pulses.   Pulmonary/Chest: Effort normal. No stridor. No respiratory distress. He has no wheezes.   Abdominal: Soft. Bowel sounds are normal. There is no tenderness.   Musculoskeletal: He exhibits tenderness. He exhibits no edema.   Tender to touch over left thigh  Drain in left thigh   Neurological: He is alert and oriented to person, place, and time. Coordination abnormal.   Difficulty bearing weight on left leg due to pain   Skin: Skin is warm and dry. Rash noted. He is not diaphoretic. There is pallor.   Candida rash resolving in groin   Psychiatric: His behavior is normal. Thought content normal.   Patient is tearful about his infection   Nursing note and vitals reviewed.      Fluids    Intake/Output Summary (Last 24 hours) at 10/7/2019 0750  Last data filed at 10/7/2019 0539  Gross per 24 hour   Intake 2280 ml   Output 4710 ml   Net -2430 ml       Laboratory  Recent Labs     10/05/19  0428 10/06/19  0333 10/07/19  0331   WBC 13.7* 12.7* 11.4*   RBC 3.69* 3.32* 3.46*   HEMOGLOBIN 9.9* 8.8* 9.0*   HEMATOCRIT 32.4* 29.2* 30.5*   MCV 87.8 88.0 88.2   MCH 26.8* 26.5* 26.0*   MCHC 30.6* 30.1* 29.5*   RDW 64.8* 65.6* 64.7*   PLATELETCT 351 348 351   MPV 8.3* 8.6* 8.3*     Recent Labs     10/05/19  0428 10/06/19  0333 10/07/19  0331   SODIUM 135 136 132*   POTASSIUM 4.5 4.2 4.1   CHLORIDE 99 101 97   CO2 27 23 25   GLUCOSE 88 82 77   BUN 12 13 13   CREATININE 0.89 0.92 0.69   CALCIUM 8.3* 8.1* 8.2*                   Imaging  MR-FEMUR-W/O LEFT   Final Result      1.  Limited exam  secondary to significant patient motion.   2.  Subacute fracture involving the left pubic bone with osteomyelitis and probable intraosseous abscess.   3.  Extensive edema and enlargement of the muscles of the medial compartment, pectineus muscle, and pubococcygeus muscle is consistent with known intramuscular abscesses. Fluid collection is most extensive in the adductor longus muscle.   4.  Subacute fractures of the sacrum               Comment: Results given to Dr. Varma through [place] text at 3:56 PM      MR-PELVIS W/O   Final Result      1.  Limited exam secondary to significant patient motion.   2.  Subacute fracture involving the left pubic bone with osteomyelitis and probable intraosseous abscess.   3.  Extensive edema and enlargement of the muscles of the medial compartment, pectineus muscle, and pubococcygeus muscle is consistent with known intramuscular abscesses. Fluid collection is most extensive in the adductor longus muscle.   4.  Subacute fractures of the sacrum               Comment: Results given to Dr. Varma through [place] text at 3:56 PM      CT-DRAIN-HEMATOMA - SEROMA   Final Result      1.  Left inguinal region muscular abscess.   2.  CT-guided drainage and catheter placement of large collection in the left obturator externus.   3.  CT-guided aspiration of smaller collection in the left pectineus muscle.      US-RUQ   Final Result      Hepatomegaly.      Status post cholecystectomy.      Dilated portal vein.      Status post cholecystectomy.      No biliary ductal dilatation.      Limited evaluation of the pancreas which is obscured.      MR-THORACIC SPINE-W/O   Final Result      1.  No acute fracture in the thoracic spine.   2.  Mild chronic compression deformity at T11.      MR-LUMBAR SPINE-W/O   Final Result      1.  Acute bilateral sacral alae fractures.   2.  There is edema in the lower posterior paraspinal soft tissue likely representing soft tissue contusion.   3.  There is no lumbar spine  fractures.         CT-LSPINE W/O PLUS RECONS   Final Result      No acute fracture or traumatic malalignment.      CT-PELVIS WITH   Final Result      1.  Recent but probably subacute fracture of the left superior pubic ramus near the symphysis. Adjacent and just lateral to this are two complex masslike structures, the largest and most medial of which contains a fluid-filled cavity. These are    consistent with hematomata, possibly intramuscular.      US-EXTREMITY VENOUS LOWER UNILAT LEFT   Final Result      1.  No evidence of Left  lower extremity deep venous thrombosis.   2.  Two complex areas within the left groin, consistent with hematomata.            INTERPRETING LOCATION: [address], [place], [address]           Assessment/Plan  * Pubic ramus fracture (HCC)  Assessment & Plan  S/p mechanical GLF  MS Continue 15mg BID continue with oxycodone, patient  Still needing iv morphine for thigh pain from infection  Rehab referral placed, will plan for transfer once pain controlled with only oral medication  Continue PT/OT  calcium and vit D for thin bones due to chronic prednisone use for rheumatoid arthritis, patient has a pelvic fracture and lumbar spine compression fracture on imaging    Osteomyelitis (HCC)  Assessment & Plan  Of the left pubic bone with abscess in the adductor muscle and possible intraosseous abscess  Discussed with ID, Dr. Goldman is aware  I called orthopedic  Surgery Dr. Olivares is reviewing the imaging and will make recommendations  Patient with severe pain needing iv morphine for breakthrough, will continue and mobilize as tolerated    Candida rash of groin  Assessment & Plan  Inproving, continue diflucan dose at 400mg daily and topical antifungal    Sepsis (HCC)  Assessment & Plan  This is Sepsis Not present on admission  SIRS criteria identified on my evaluation include: Tachycardia, with heart rate greater than 90 BPM and Leukocyosis, with WBC greater than 12,000  Source is left thigh  abscess  Leukocytosis is improving, will monitor  On antibiotics          Abscess of left thigh  Assessment & Plan  Multiple abscesses found on imaging  Culture with MSSA and gram negative growth, continue unasyn  Drain in place with output much less, 10mL out yesterday  Other fluid collection was small not needing drainage per Dr. Olguin, continue antibiotics  ID consulted and due to depth of infection on MRI will need several weeks of antibiotics    Hematoma of left thigh  Assessment & Plan  Due to ground level fall,   Now with infected with abscess, drain in place, continue antibiotics for MSSA in culture  Continue pain control      Bilateral leg edema  Assessment & Plan  Intermittently, with low albumin  Patient has normal liver contour, concern for poor absorption enterally, discussed nutrition needs with wife and patient    Iron deficiency anemia, unspecified iron deficiency anemia type- (present on admission)  Assessment & Plan  Anemia is stable, iv iron given last admission  No acute bleed      Essential hypertension- (present on admission)  Assessment & Plan  Well Controlled on Coreg, Losartan and lasix.     Chronic pain of right knee- (present on admission)  Assessment & Plan  Chronic pain is controlled on MS contin + short acting oxycodone for breakthrough,   Continue lyrica  Patient continues to require iv morphine for new pain due to osteomyelitis, just detected, and abscesses,  will monitor closely for any respiratory depression or hypotension and taper as tolerated    Rheumatoid arthritis (HCC)- (present on admission)  Assessment & Plan  Continue Humira as outpatient once infection is cleared  plan and imaging results were discussed with the patient's wife in detail    Oxy 5mg not holding the patient's pain as he is using morphine after an hour of taking oxy 5mg, will increase to oxy 10mg and see if patient's pain is improved to decrease morphine    VTE prophylaxis: lovenox

## 2019-10-07 NOTE — PROGRESS NOTES
Infectious Disease Progress Note    Author: Jessica Goldman M.D. Date & Time of service: 10/7/2019  9:07 AM    Chief Complaint:  Follow-up for left thigh abscess    Interval History:  44 y.o. man with a history of rheumatoid arthritis on Humira, chronic right knee pain with plans for total right knee arthroplasty, and recent fall sustained 1 month prior to admission admitted for worsening left thigh/groin pain.  Patient found to have multiple fluid collections on imaging which were concerning for an abscess.  He underwent CT-guided drainage on 10/2 with cultures growing MSSA.    10/6 afebrile WBC 12.7 ongoing left groin pain however the crampy pain is somewhat decreased and he is able to ambulate better.  Plan for MRI of the pelvis and femur today  10/7 afebrile WBC 11.4 patient is very anxious after hearing about results of MRI.  MRI of the pelvis reveals osteomyelitis.  Awaiting surgical evaluation for possible surgical debridement.  Patient states left thigh pain remains unchanged and requiring multiple pain medications.  Pain worse with standing    Labs Reviewed.    Review of Systems:  Review of Systems   Constitutional: Negative for chills and fever.   Respiratory: Negative for cough and shortness of breath.    Gastrointestinal: Negative for abdominal pain, diarrhea, nausea and vomiting.   Musculoskeletal: Positive for myalgias.   Skin: Positive for rash.   Neurological: Negative for dizziness and headaches.   Psychiatric/Behavioral: The patient is nervous/anxious.    All other systems reviewed and are negative.      Hemodynamics:  Temp (24hrs), Av.7 °C (98 °F), Min:36.6 °C (97.8 °F), Max:36.8 °C (98.3 °F)  Temperature: 36.6 °C (97.8 °F)  Pulse  Av.7  Min: 50  Max: 105   Blood Pressure: (!) 99/55       Physical Exam:  Physical Exam   Constitutional: He is oriented to person, place, and time. He appears well-developed.   Pallor   HENT:   Mouth/Throat: No oropharyngeal exudate.   Eyes: Pupils are  equal, round, and reactive to light. Conjunctivae and EOM are normal.   Neck: Neck supple.   Cardiovascular: Normal rate, regular rhythm and normal heart sounds.   Pulmonary/Chest: Effort normal and breath sounds normal.   Abdominal: Soft.   Intertrigo in skin folds   Musculoskeletal: He exhibits tenderness. He exhibits no edema.   Left groin/thigh drain  + Tenderness to palpation in left groin.  No edema    Candidiasis on plantar aspect of both feet   Neurological: He is alert and oriented to person, place, and time. No cranial nerve deficit.   Skin: Skin is warm and dry.   Psychiatric:   Pleasant  Anxious       Meds:    Current Facility-Administered Medications:   •  oxyCODONE immediate-release  •  fluconazole  •  enoxaparin  •  miconazole  •  ampicillin-sulbactam (UNASYN) IV  •  vitamin D  •  calcium citrate  •  morphine ER  •  ferrous sulfate  •  furosemide  •  losartan  •  omeprazole  •  pregabalin  •  senna-docusate **AND** polyethylene glycol/lytes **AND** magnesium hydroxide **AND** bisacodyl  •  acetaminophen  •  nicotine **AND** Nicotine Replacement Patient Education Materials **AND** nicotine polacrilex  •  morphine injection    Labs:  Recent Labs     10/05/19  0428 10/06/19  0333 10/07/19  0331   WBC 13.7* 12.7* 11.4*   RBC 3.69* 3.32* 3.46*   HEMOGLOBIN 9.9* 8.8* 9.0*   HEMATOCRIT 32.4* 29.2* 30.5*   MCV 87.8 88.0 88.2   MCH 26.8* 26.5* 26.0*   RDW 64.8* 65.6* 64.7*   PLATELETCT 351 348 351   MPV 8.3* 8.6* 8.3*   NEUTSPOLYS 67.00 59.40 55.10   LYMPHOCYTES 18.00* 21.90* 25.30   MONOCYTES 9.00 8.10 9.30   EOSINOPHILS 1.00 0.90 1.10   BASOPHILS 1.00 0.90 1.20   RBCMORPHOLO Present  --   --      Recent Labs     10/05/19  0428 10/06/19  0333 10/07/19  0331   SODIUM 135 136 132*   POTASSIUM 4.5 4.2 4.1   CHLORIDE 99 101 97   CO2 27 23 25   GLUCOSE 88 82 77   BUN 12 13 13     Recent Labs     10/05/19  0428 10/06/19  0333 10/07/19  0331   CREATININE 0.89 0.92 0.69       Imaging:  Ct-drain-hematoma -  Seroma    Result Date: 10/2/2019  10/2/2019 12:14 PM HISTORY/REASON FOR EXAM:  Left inguinal fluid collection. Moderate sedation was administered with continuous monitoring of the patient under the direct supervision of  Total sedation time: 30 minutes The biopsy/drainage was done utilizing low dose optimization CT techniques including auto modulation for  imaging and low mA CT/fluoroscopy mode for the procedure. PROCEDURE: After the informed consent the patient was placed on the CT table on supine position. The skin over the left groin was prepped. Localization CT scan demonstrates fluid collection in the left obturator externus and pectineus muscles. Subsequently after injecting lidocaine needle was advanced into the larger fluid collection. Pus was aspirated. Subsequently a wire was introduced. After dilating the tract an 8 Turkmen drain was introduced and placed with its loop in the fluid collection. An approximately 100 mL of fluid was aspirated. Subsequently under the needle was advanced into the superficial/pectineus muscle fluid collection. An approximately 10 mL of fluid was aspirated. No drain was placed in the superficial fluid collection. A sample of the material was sent to the microbe allergy. The patient tolerated the procedure without any immediate complication.     1.  Left inguinal region muscular abscess. 2.  CT-guided drainage and catheter placement of large collection in the left obturator externus. 3.  CT-guided aspiration of smaller collection in the left pectineus muscle.    Ct-lspine W/o Plus Recons    Result Date: 9/28/2019 9/28/2019 4:58 AM HISTORY/REASON FOR EXAM: Back pain after recent fall TECHNIQUE/EXAM DESCRIPTION: CT lumbar spine without contrast, with reconstructions. Noncontrast helical scanning was obtained from T12 through the sacrum.  Sagittal reconstructions were generated from the axial images. Low dose optimization technique was utilized for this CT exam  including automated exposure control and adjustment of the mA and/or kV according to patient size. COMPARISON:  None. FINDINGS: The bones are markedly osteopenic. There is a minimal compression deformity of the superior endplate of the L2 body, which is old. There is no evidence of acute fracture. There is multilevel interspace and facet arthropathy.     No acute fracture or traumatic malalignment.    Ct-pelvis With    Result Date: 9/28/2019 9/28/2019 3:21 AM HISTORY/REASON FOR EXAM:  significant left groin pain, possible hematoma on us. TECHNIQUE/EXAM DESCRIPTION AND NUMBER OF VIEWS: CT scan of the pelvis with contrast. Contrast-enhanced helical scanning of the pelvis was obtained from the iliac crests through the pubic symphysis following the bolus administration of 100 mL of Omnipaque 350 nonionic contrast without complication. Low dose optimization technique was utilized for this CT exam including automated exposure control and adjustment of the mA and/or kV according to patient size. COMPARISON:  None. FINDINGS: In the deep left inguinal region, there is a masslike structure containing irregular fluid-filled cavity, which measures overall about 8.6 cm in longest dimension. Within the cavity or focal areas of increased attenuation consistent with calcification. This lesion is immediately adjacent to a recent no probably subacute fracture of the left superior pubic ramus near the symphysis. Lateral to this lesion is a smaller complex mass measuring about 7.3 cm maximum dimension. There is no abnormality within the visualized pelvic cavity and there is no free intraperitoneal fluid. The bladder is intact.     1.  Recent but probably subacute fracture of the left superior pubic ramus near the symphysis. Adjacent and just lateral to this are two complex masslike structures, the largest and most medial of which contains a fluid-filled cavity. These are consistent with hematomata, possibly intramuscular.    Dx-lumbar  Spine-2 Or 3 Views    Result Date: 9/16/2019 9/16/2019 4:15 PM HISTORY/REASON FOR EXAM:  Atraumatic Pain TECHNIQUE/ EXAM DESCRIPTION AND NUMBER OF VIEWS:  3 views of the lumbar spine. COMPARISON: None. FINDINGS: Dextroconvex lumbar curvature. Evaluation of the sacrum is limited due to overlying bowel content. No acute fracture. No malalignment. No compression deformity. Mild to moderate degenerative change of the lumbar spine.     Dextroconvex lumbar curvature. No acute osseous abnormality. No malalignment.    Dx-thoracic Spine-with Swimmers View    Result Date: 9/16/2019 9/16/2019 4:15 PM HISTORY/REASON FOR EXAM:  Atraumatic Pain; ?scoliotic MId back pain and low back pain after GLF x 2 weeks TECHNIQUE/EXAM DESCRIPTION AND NUMBER OF VIEWS:  Thoracic spine, 3 views. COMPARISON:  None. FINDINGS: Diffuse osseous demineralization, limiting evaluation for nondisplaced fracture. No acute fracture. No malalignment. Mild chronic height loss of the mid thoracic spine from T9 to T11. Mild degenerative change of the thoracic spine.     Mild chronic height loss of the mid thoracic spine from T9 to T11.    Mr-lumbar Spine-w/o    Result Date: 9/28/2019 9/28/2019 6:21 PM HISTORY/REASON FOR EXAM:  back pain. TECHNIQUE/EXAM DESCRIPTION: MRI of the lumbar spine without contrast. The study was performed on a [employer] 1.5 Mae MRI scanner. T1 sagittal, T2 fast spin-echo sagittal, and T2 axial images were obtained of the lumbar spine. COMPARISON:  None. FINDINGS: There are bilateral sacral alar fractures. The lumbar spine maintains normal height and alignment. At the level of L5-S1, there is no spinal or neural foraminal stenosis. At the level of L4-5, there is no spinal or neural foraminal stenosis. At the level of L3-4, there is no spinal or neural foraminal stenosis. At the level of the L2-3, there is no spinal or neural foraminal stenosis. At the level of L1-2, there is no spinal or neural foraminal stenosis. The conus  terminates at the level of L1. The visualized lower thoracic spinal cord appear normal. There is edema in the posterior paraspinal soft tissue likely representing soft tissue contusion.     1.  Acute bilateral sacral alae fractures. 2.  There is edema in the lower posterior paraspinal soft tissue likely representing soft tissue contusion. 3.  There is no lumbar spine fractures.     Mr-thoracic Spine-w/o    Result Date: 9/28/2019 9/28/2019 6:21 PM HISTORY/REASON FOR EXAM:  back pain TECHNIQUE/EXAM DESCRIPTION: MRI of the thoracic spine without contrast. The study was performed on a [employer] 1.5 Mae MRI scanner. T1 sagittal, T2 fast spin-echo sagittal, and T2 axial images were obtained of the thoracic spine. COMPARISON: None. FINDINGS: There is mild chronic compression deformity at T11. There is no fracture or dislocation. There is no pathologic marrow infiltration. There is no intradural extramedullary lesion. There is no abnormal intramedullary T2 signal intensity in the thoracic spinal cord.     1.  No acute fracture in the thoracic spine. 2.  Mild chronic compression deformity at T11.    Us-extremity Venous Lower Unilat Left    Result Date: 9/28/2019  HISTORY/REASON FOR EXAM:  Leg swelling after fall TECHNIQUE/EXAM DESCRIPTION: Left lower extremity doppler venous ultrasound was performed. Using both color and pulse-wave Doppler imaging, multiple images were obtained from the common femoral vein origins distally through the popliteal trifurcations.  9/28/2019 12:54 AM COMPARISON:  None. FINDINGS: REAL-TIME GRAY-SCALE IMAGING: Real-time gray-scale imaging reveals no evidence of focal wall thickening. COLOR AND DUPLEX DOPPLER IMAGING: Graded compression was used to demonstrate patent lumens.  There is no evidence for luminal thrombus.  There is normal response to augmentation and respiratory variation. There are two rounded complex areas in the left groin region, the largest measuring about 10.9 cm diameter and  the smaller, about 8.3 cm in diameter, both of which are consistent with hematomata.     1.  No evidence of Left  lower extremity deep venous thrombosis. 2.  Two complex areas within the left groin, consistent with hematomata. INTERPRETING LOCATION: [address], [place], [address]    Us-ruq    Result Date: 10/1/2019  10/1/2019 5:49 PM HISTORY/REASON FOR EXAM:  Edema Abdominal pain TECHNIQUE/EXAM DESCRIPTION AND NUMBER OF VIEWS:  Real-time sonography of the liver and biliary tree. COMPARISON: None FINDINGS: The liver is normal in contour. There is no evidence of solid mass lesion. The liver measures 20.15 cm. The gallbladder is surgically absent. The common duct measures 0.44 cm. The pancreas is obscured by bowel gas. The visualized aorta is normal in caliber. Intrahepatic IVC is patent. The portal vein is patent with hepatopetal flow. The MPV measures 1.57 cm. The right kidney measures 11.06 cm. There is no hydronephrosis. There is no ascites.     Hepatomegaly. Status post cholecystectomy. Dilated portal vein. Status post cholecystectomy. No biliary ductal dilatation. Limited evaluation of the pancreas which is obscured.      Micro:  Results     Procedure Component Value Units Date/Time    Anaerobic Culture [[id number]] Collected:  10/02/19 1330    Order Status:  Completed Specimen:  Wound Updated:  10/05/19 1019     Significant Indicator NEG     Source WND     Site Left Thigh Abscess     Culture Result No Anaerobes isolated.    Narrative:       Radiology specimen    CULTURE WOUND W/ GRAM STAIN [[id number]]  (Abnormal)  (Susceptibility) Collected:  10/02/19 1330    Order Status:  Completed Specimen:  Wound Updated:  10/05/19 1019     Significant Indicator POS     Source WND     Site Left Thigh Abscess     Culture Result -     Gram Stain Result Many WBCs.  Moderate Gram positive cocci.  Rare Gram negative rods.       Culture Result Staphylococcus aureus  Moderate growth      Narrative:       Radiology specimen     "Susceptibility     Staphylococcus aureus (1)     Antibiotic Interpretation Microscan Method Status    Clindamycin Sensitive <=0.5 mcg/mL MARÍA Final    Daptomycin Sensitive <=0.5 mcg/mL MARÍA Final    Erythromycin Sensitive <=0.5 mcg/mL MARÍA Final    Ampicillin/sulbactam Sensitive <=8/4 mcg/mL MARÍA Final    Moxifloxacin Intermediate 4 mcg/mL MARÍA Final    Vancomycin Sensitive 1 mcg/mL MARÍA Final    Oxacillin Sensitive <=0.25 mcg/mL MARÍA Final    Trimeth/Sulfa Sensitive <=0.5/9.5 mcg/mL MARÍA Final    Tetracycline Sensitive <=4 mcg/mL MARÍA Final                   Blood Culture [[id number]] Collected:  10/02/19 1541    Order Status:  Completed Specimen:  Blood from Peripheral Updated:  10/03/19 0730     Significant Indicator NEG     Source BLD     Site Peripheral     Culture Result No Growth  Note: Blood cultures are incubated for 5 days and  are monitored continuously.Positive blood cultures  are called to the RN and reported as soon as  they are identified.  Blood culture testing and Gram stain, if indicated, are  performed at [address].  Positive blood cultures are  sent to [facility], [address] [place] [address], for organism identification and  susceptibility testing.      Narrative:       From different peripheral sites, if not done within the last  24 hours (Per Hospital Policy: Only change specimen source to  \"Line\" if specified by physician order)  Left Hand    Blood Culture [[id number]] Collected:  10/02/19 1540    Order Status:  Completed Specimen:  Blood from Peripheral Updated:  10/03/19 0730     Significant Indicator NEG     Source BLD     Site Peripheral     Culture Result No Growth  Note: Blood cultures are incubated for 5 days and  are monitored continuously.Positive blood cultures  are called to the RN and reported as soon as  they are identified.  Blood culture testing and Gram stain, if indicated, are  performed at [facility]" "[facility] [place] [facility], [address]  [facility], [place], [place].  Positive blood cultures are  sent to [facility], [address] [place] [address], for organism identification and  susceptibility testing.      Narrative:       From different peripheral sites, if not done within the last  24 hours (Per Hospital Policy: Only change specimen source to  \"Line\" if specified by physician order)  Right AC    GRAM STAIN [[id number]] Collected:  10/02/19 1330    Order Status:  Completed Specimen:  Wound Updated:  10/03/19 0107     Significant Indicator .     Source WND     Site Left Thigh Abscess     Gram Stain Result Many WBCs.  Moderate Gram positive cocci.  Rare Gram negative rods.      Narrative:       Radiology specimen    FLUID CULTURE W/GRAM STAIN [[id number]] Collected:  10/02/19 1330    Order Status:  Canceled Specimen:  Other     FLUID CULTURE [[id number]]     Order Status:  No result Specimen:  Other Body Fluid           Assessment:  Active Hospital Problems    Diagnosis   • *Pubic ramus fracture ([facility]) [S32.599A]   • Essential hypertension [I10]   • Rheumatoid arthritis ([facility]) [M06.9]   • Chronic pain of right knee [M25.561, G89.29]   • Candida rash of groin [B37.89]   • Abscess of left thigh [L02.416]   • Sepsis ([facility]) [A41.9]   • Hematoma of left thigh [S70.12XA]       Plan:  Osteomyelitis of the left pubic bone, new (noted on MRI)  Status post recent fall found to have subacute fracture on presentation  Awaiting orthopedic surgery evaluation  Check ESR-ordered  Anticipate a PICC line and 6 weeks of IV antibiotics  Continue antibiotics per below    Abscess of left thigh  No fevers  Persistent leukocytosis  Sustained recent fall 1 month prior to admission  Blood cultures-negative to date  Status post CT-guided drainage on 10/2  Cultures+ MSSA.  Gram stain+ GNR, GPC  Continue IV Unasyn    Leukocytosis, persistent  Secondary to above  On antibiotics above  Improved " overall  Monitor    Candidiasis  Continue p.o. fluconazole 400 mg daily    Rheumatoid arthritis  On Humira prior to admission  Chronically immunosuppressed    Chronic right knee pain  Plan for future total right knee arthroplasty    Recent fall  Approximately 1 month prior to admission  Imaging on presentation revealed subacute fracture of the left pubic bone    I have performed a physical exam and reviewed and updated ROS and plan today 10/7/2019.  In review of yesterday's note 10/6/2019, there are no changes except as documented above.    Disposition: Hospitalist recommending SNF    Plan of care discussed with internal medicine/Dr. Varma and FAITH Harman

## 2019-10-07 NOTE — CARE PLAN
Problem: Safety  Goal: Will remain free from falls  Outcome: PROGRESSING AS EXPECTED   Patient encouraged to call before getting out of bed or chair.  Patient is call light appropriate.     Problem: Mobility  Goal: Risk for activity intolerance will decrease  Outcome: PROGRESSING SLOWER THAN EXPECTED   Patient encouraged to get up and walk to prevent blood clots.

## 2019-10-07 NOTE — ASSESSMENT & PLAN NOTE
MRI femus shows Subacute fracture involving the left pubic bone with osteomyelitis surgical debridement was done 10/9  Ancef to continue for MSSA

## 2019-10-08 ENCOUNTER — APPOINTMENT (OUTPATIENT)
Dept: RADIOLOGY | Facility: MEDICAL CENTER | Age: 44
DRG: 515 | End: 2019-10-08
Attending: INTERNAL MEDICINE
Payer: COMMERCIAL

## 2019-10-08 LAB
ANION GAP SERPL CALC-SCNC: 11 MMOL/L (ref 0–11.9)
BASOPHILS # BLD AUTO: 1.1 % (ref 0–1.8)
BASOPHILS # BLD: 0.09 K/UL (ref 0–0.12)
BUN SERPL-MCNC: 12 MG/DL (ref 8–22)
CALCIUM SERPL-MCNC: 8.8 MG/DL (ref 8.4–10.2)
CHLORIDE SERPL-SCNC: 99 MMOL/L (ref 96–112)
CO2 SERPL-SCNC: 28 MMOL/L (ref 20–33)
CREAT SERPL-MCNC: 0.68 MG/DL (ref 0.5–1.4)
EOSINOPHIL # BLD AUTO: 0.06 K/UL (ref 0–0.51)
EOSINOPHIL NFR BLD: 0.8 % (ref 0–6.9)
ERYTHROCYTE [DISTWIDTH] IN BLOOD BY AUTOMATED COUNT: 65.7 FL (ref 35.9–50)
ERYTHROCYTE [SEDIMENTATION RATE] IN BLOOD BY WESTERGREN METHOD: 55 MM/HOUR (ref 0–15)
GLUCOSE SERPL-MCNC: 91 MG/DL (ref 65–99)
HCT VFR BLD AUTO: 30.3 % (ref 42–52)
HGB BLD-MCNC: 9 G/DL (ref 14–18)
IMM GRANULOCYTES # BLD AUTO: 0.41 K/UL (ref 0–0.11)
IMM GRANULOCYTES NFR BLD AUTO: 5.2 % (ref 0–0.9)
LYMPHOCYTES # BLD AUTO: 2.34 K/UL (ref 1–4.8)
LYMPHOCYTES NFR BLD: 29.4 % (ref 22–41)
MCH RBC QN AUTO: 26.1 PG (ref 27–33)
MCHC RBC AUTO-ENTMCNC: 29.7 G/DL (ref 33.7–35.3)
MCV RBC AUTO: 87.8 FL (ref 81.4–97.8)
MONOCYTES # BLD AUTO: 0.75 K/UL (ref 0–0.85)
MONOCYTES NFR BLD AUTO: 9.4 % (ref 0–13.4)
NEUTROPHILS # BLD AUTO: 4.3 K/UL (ref 1.82–7.42)
NEUTROPHILS NFR BLD: 54.1 % (ref 44–72)
NRBC # BLD AUTO: 0 K/UL
NRBC BLD-RTO: 0 /100 WBC
PLATELET # BLD AUTO: 352 K/UL (ref 164–446)
PMV BLD AUTO: 8.4 FL (ref 9–12.9)
POTASSIUM SERPL-SCNC: 4.3 MMOL/L (ref 3.6–5.5)
RBC # BLD AUTO: 3.45 M/UL (ref 4.7–6.1)
SODIUM SERPL-SCNC: 138 MMOL/L (ref 135–145)
WBC # BLD AUTO: 8 K/UL (ref 4.8–10.8)

## 2019-10-08 PROCEDURE — C1751 CATH, INF, PER/CENT/MIDLINE: HCPCS

## 2019-10-08 PROCEDURE — 700111 HCHG RX REV CODE 636 W/ 250 OVERRIDE (IP): Performed by: INTERNAL MEDICINE

## 2019-10-08 PROCEDURE — 700102 HCHG RX REV CODE 250 W/ 637 OVERRIDE(OP): Performed by: HOSPITALIST

## 2019-10-08 PROCEDURE — 85652 RBC SED RATE AUTOMATED: CPT

## 2019-10-08 PROCEDURE — 05HY33Z INSERTION OF INFUSION DEVICE INTO UPPER VEIN, PERCUTANEOUS APPROACH: ICD-10-PCS | Performed by: INTERNAL MEDICINE

## 2019-10-08 PROCEDURE — A6250 SKIN SEAL PROTECT MOISTURIZR: HCPCS | Performed by: HOSPITALIST

## 2019-10-08 PROCEDURE — 700111 HCHG RX REV CODE 636 W/ 250 OVERRIDE (IP): Performed by: HOSPITALIST

## 2019-10-08 PROCEDURE — A9270 NON-COVERED ITEM OR SERVICE: HCPCS | Performed by: HOSPITALIST

## 2019-10-08 PROCEDURE — 99233 SBSQ HOSP IP/OBS HIGH 50: CPT | Performed by: HOSPITALIST

## 2019-10-08 PROCEDURE — 85025 COMPLETE CBC W/AUTO DIFF WBC: CPT

## 2019-10-08 PROCEDURE — A9270 NON-COVERED ITEM OR SERVICE: HCPCS | Performed by: INTERNAL MEDICINE

## 2019-10-08 PROCEDURE — 99233 SBSQ HOSP IP/OBS HIGH 50: CPT | Performed by: INTERNAL MEDICINE

## 2019-10-08 PROCEDURE — 700102 HCHG RX REV CODE 250 W/ 637 OVERRIDE(OP): Performed by: INTERNAL MEDICINE

## 2019-10-08 PROCEDURE — 770001 HCHG ROOM/CARE - MED/SURG/GYN PRIV*

## 2019-10-08 PROCEDURE — 80048 BASIC METABOLIC PNL TOTAL CA: CPT

## 2019-10-08 PROCEDURE — 36415 COLL VENOUS BLD VENIPUNCTURE: CPT

## 2019-10-08 PROCEDURE — 700105 HCHG RX REV CODE 258: Performed by: INTERNAL MEDICINE

## 2019-10-08 RX ORDER — LORATADINE 10 MG/1
10 TABLET ORAL ONCE
Status: COMPLETED | OUTPATIENT
Start: 2019-10-08 | End: 2019-10-08

## 2019-10-08 RX ADMIN — PREGABALIN 25 MG: 25 CAPSULE ORAL at 04:37

## 2019-10-08 RX ADMIN — PREGABALIN 25 MG: 25 CAPSULE ORAL at 17:40

## 2019-10-08 RX ADMIN — MORPHINE SULFATE 30 MG: 30 TABLET, EXTENDED RELEASE ORAL at 04:37

## 2019-10-08 RX ADMIN — OMEPRAZOLE 40 MG: 20 CAPSULE, DELAYED RELEASE ORAL at 17:40

## 2019-10-08 RX ADMIN — MORPHINE SULFATE 4 MG: 4 INJECTION INTRAVENOUS at 10:13

## 2019-10-08 RX ADMIN — NICOTINE 14 MG: 14 PATCH, EXTENDED RELEASE TRANSDERMAL at 04:36

## 2019-10-08 RX ADMIN — AMPICILLIN AND SULBACTAM 3 G: 1; 2 INJECTION, POWDER, FOR SOLUTION INTRAMUSCULAR; INTRAVENOUS at 12:41

## 2019-10-08 RX ADMIN — AMPICILLIN AND SULBACTAM 3 G: 1; 2 INJECTION, POWDER, FOR SOLUTION INTRAMUSCULAR; INTRAVENOUS at 17:37

## 2019-10-08 RX ADMIN — LORATADINE 10 MG: 10 TABLET ORAL at 15:02

## 2019-10-08 RX ADMIN — PREGABALIN 25 MG: 25 CAPSULE ORAL at 12:41

## 2019-10-08 RX ADMIN — FERROUS SULFATE TAB 325 MG (65 MG ELEMENTAL FE) 325 MG: 325 (65 FE) TAB at 04:37

## 2019-10-08 RX ADMIN — OMEPRAZOLE 40 MG: 20 CAPSULE, DELAYED RELEASE ORAL at 04:37

## 2019-10-08 RX ADMIN — MORPHINE SULFATE 30 MG: 30 TABLET, EXTENDED RELEASE ORAL at 17:39

## 2019-10-08 RX ADMIN — OXYCODONE HYDROCHLORIDE 10 MG: 10 TABLET ORAL at 21:46

## 2019-10-08 RX ADMIN — MICONAZOLE NITRATE: 20 CREAM TOPICAL at 17:45

## 2019-10-08 RX ADMIN — VITAMIN D, TAB 1000IU (100/BT) 1000 UNITS: 25 TAB at 04:37

## 2019-10-08 RX ADMIN — FUROSEMIDE 20 MG: 40 TABLET ORAL at 17:40

## 2019-10-08 RX ADMIN — MORPHINE SULFATE 4 MG: 4 INJECTION INTRAVENOUS at 04:44

## 2019-10-08 RX ADMIN — ENOXAPARIN SODIUM 40 MG: 100 INJECTION SUBCUTANEOUS at 04:36

## 2019-10-08 RX ADMIN — SENNOSIDES, DOCUSATE SODIUM 2 TABLET: 50; 8.6 TABLET, FILM COATED ORAL at 04:37

## 2019-10-08 RX ADMIN — FLUCONAZOLE 400 MG: 200 TABLET ORAL at 04:37

## 2019-10-08 RX ADMIN — AMPICILLIN AND SULBACTAM 3 G: 1; 2 INJECTION, POWDER, FOR SOLUTION INTRAMUSCULAR; INTRAVENOUS at 00:00

## 2019-10-08 RX ADMIN — MICONAZOLE NITRATE: 20 CREAM TOPICAL at 06:00

## 2019-10-08 RX ADMIN — OXYCODONE HYDROCHLORIDE 10 MG: 10 TABLET ORAL at 08:13

## 2019-10-08 RX ADMIN — AMPICILLIN AND SULBACTAM 3 G: 1; 2 INJECTION, POWDER, FOR SOLUTION INTRAMUSCULAR; INTRAVENOUS at 04:36

## 2019-10-08 RX ADMIN — MORPHINE SULFATE 4 MG: 4 INJECTION INTRAVENOUS at 15:06

## 2019-10-08 RX ADMIN — LOSARTAN POTASSIUM 25 MG: 25 TABLET, FILM COATED ORAL at 04:37

## 2019-10-08 RX ADMIN — MORPHINE SULFATE 4 MG: 4 INJECTION INTRAVENOUS at 23:52

## 2019-10-08 RX ADMIN — MORPHINE SULFATE 4 MG: 4 INJECTION INTRAVENOUS at 19:22

## 2019-10-08 RX ADMIN — CALCIUM CITRATE 200 MG (950 MG) TABLET 950 MG: at 04:37

## 2019-10-08 RX ADMIN — SENNOSIDES, DOCUSATE SODIUM 2 TABLET: 50; 8.6 TABLET, FILM COATED ORAL at 17:40

## 2019-10-08 RX ADMIN — FUROSEMIDE 20 MG: 40 TABLET ORAL at 04:36

## 2019-10-08 RX ADMIN — OXYCODONE HYDROCHLORIDE 10 MG: 10 TABLET ORAL at 12:41

## 2019-10-08 ASSESSMENT — ENCOUNTER SYMPTOMS
PALPITATIONS: 0
WEAKNESS: 0
DIZZINESS: 0
BACK PAIN: 0
CONSTIPATION: 0
NERVOUS/ANXIOUS: 1
ORTHOPNEA: 0
BLURRED VISION: 0
MYALGIAS: 1
HEARTBURN: 0
CLAUDICATION: 0
FEVER: 0
PHOTOPHOBIA: 0
HEADACHES: 0
ABDOMINAL PAIN: 0
CHILLS: 0
BLOOD IN STOOL: 0
VOMITING: 0
PND: 0
EYE PAIN: 0
DEPRESSION: 0
SPEECH CHANGE: 0
MEMORY LOSS: 0
NAUSEA: 0
STRIDOR: 0
COUGH: 0
DIARRHEA: 0
DOUBLE VISION: 0
TREMORS: 0
SPUTUM PRODUCTION: 0
NECK PAIN: 0
TINGLING: 0
SORE THROAT: 0
SHORTNESS OF BREATH: 0
HEMOPTYSIS: 0
SENSORY CHANGE: 0

## 2019-10-08 NOTE — PROGRESS NOTES
Pt slept aprox 6 hours intermittently. Medicated numerous times with morphine. And po pain meds. Tolerated all am meds. Antibiotic infusing.

## 2019-10-08 NOTE — PROGRESS NOTES
[facility] Medicine Daily Progress Note    Date of Service  10/8/2019    Chief Complaint  44 y.o. male admitted 9/27/2019 with left thigh pain    Hospital Course    44 y.o. male who presented to the emergency department on 9/27/2019 for evaluation of worsening left leg pain.  Patient had a fall about a month ago.  Patient has a problem with his right knee, on schedule for total knee replacement and ever since walking with a cane or walker. He had increasing pain in the left thigh and CT scan showed a left pubic ramus fracture with two large hematomas. Orthopedic surgery did see the patient and recommends no surgical intervention.  The patient had a rising white cell count and increasing pain so hematoma drainage was done by interventional radiology and tiffanie pus was drained consistent with an abscess. Cultures grew MSSA and drain was left in place.      Interval Problem Update  No acute issues overnight, patient status he still has 5 out of 10 pain in his left thigh area, however pain is well controlled with his current regimen.  Patient is followed by ID, continue IV antibiotics  Waiting for surgical intervention by orthopedics    Consultants/Specialty  Orthopedic surgery Dr. Olivares  Radiology Dr. Olguin    Code Status  Full Code    Disposition  TBD    Review of Systems  Review of Systems   Constitutional: Positive for malaise/fatigue. Negative for chills and fever.   HENT: Negative for congestion, hearing loss, sore throat and tinnitus.    Eyes: Negative for blurred vision, double vision, photophobia and pain.   Respiratory: Negative for cough, hemoptysis, sputum production, shortness of breath and stridor.    Cardiovascular: Negative for chest pain, palpitations, orthopnea, claudication, leg swelling and PND.   Gastrointestinal: Negative for abdominal pain, blood in stool, constipation, heartburn, melena, nausea and vomiting.   Genitourinary: Negative for dysuria, frequency and urgency.   Musculoskeletal:  Positive for joint pain and myalgias. Negative for back pain and neck pain.        Left thigh pain   Skin: Positive for rash. Negative for itching.        Mild skin peeling over his left thigh area   Neurological: Negative for dizziness, tingling, tremors, sensory change, speech change, weakness and headaches.   Psychiatric/Behavioral: Negative for depression, memory loss and suicidal ideas. The patient is nervous/anxious.    All other systems reviewed and are negative.       Physical Exam  Temp:  [36.7 °C (98 °F)-36.8 °C (98.3 °F)] 36.7 °C (98 °F)  Pulse:  [] 99  Resp:  [18-20] 18  BP: (107-115)/(61-81) 115/81  SpO2:  [96 %-99 %] 96 %    Physical Exam   Constitutional: He is oriented to person, place, and time. He appears well-developed and well-nourished. No distress.   HENT:   Head: Normocephalic and atraumatic.   Mouth/Throat: No oropharyngeal exudate.   Eyes: Pupils are equal, round, and reactive to light. Conjunctivae and EOM are normal. Right eye exhibits no discharge. No scleral icterus.   Neck: Neck supple. No JVD present. No tracheal deviation present. No thyromegaly present.   Cardiovascular: Normal rate, regular rhythm and intact distal pulses.   No murmur heard.  Pulses:       Dorsalis pedis pulses are 2+ on the right side, and 2+ on the left side.   Cap refill < 3 s   Pulmonary/Chest: Effort normal and breath sounds normal. No stridor. No respiratory distress. He has no wheezes. He has no rales.   Abdominal: Soft. Bowel sounds are normal. He exhibits no distension. There is no tenderness. There is no rebound.   Musculoskeletal: Normal range of motion. He exhibits tenderness (Left diarrhea). He exhibits no edema.   Left thigh tenderness, with noted drain in left thigh, serosanguineouss   Neurological: He is alert and oriented to person, place, and time. Coordination abnormal.   Difficulty bearing weight on left leg due to pain   Skin: Skin is warm and dry. Rash noted. He is not diaphoretic. No  erythema. There is pallor.   Macular rash over trunk.      Psychiatric: He has a normal mood and affect. His behavior is normal. Thought content normal.   Nursing note and vitals reviewed.      Fluids    Intake/Output Summary (Last 24 hours) at 10/8/2019 1506  Last data filed at 10/8/2019 1400  Gross per 24 hour   Intake 840 ml   Output 5605 ml   Net -4765 ml       Laboratory  Recent Labs     10/06/19  0333 10/07/19  0331 10/08/19  0421   WBC 12.7* 11.4* 8.0   RBC 3.32* 3.46* 3.45*   HEMOGLOBIN 8.8* 9.0* 9.0*   HEMATOCRIT 29.2* 30.5* 30.3*   MCV 88.0 88.2 87.8   MCH 26.5* 26.0* 26.1*   MCHC 30.1* 29.5* 29.7*   RDW 65.6* 64.7* 65.7*   PLATELETCT 348 351 352   MPV 8.6* 8.3* 8.4*     Recent Labs     10/06/19  0333 10/07/19  0331 10/08/19  0421   SODIUM 136 132* 138   POTASSIUM 4.2 4.1 4.3   CHLORIDE 101 97 99   CO2 23 25 28   GLUCOSE 82 77 91   BUN 13 13 12   CREATININE 0.92 0.69 0.68   CALCIUM 8.1* 8.2* 8.8                   Imaging  IR-PICC LINE PLACEMENT W/ GUIDANCE > AGE 5   Final Result                  Ultrasound-guided PICC placement performed by qualified nursing staff as    above.          CT-ABDOMEN-PELVIS WITH   Final Result      1.  Interval decrease in the intramuscular abscesses involving the left pelvis and thigh following drain placement. Collection in the pubococcygeus muscle is similar in size to the prior exam.      2.  Ventral hernia containing anterior bowel wall and fat. No secondary obstruction.      US-EXTREMITY NON VASCULAR UNILATERAL RIGHT   Final Result      Loculated collection in the posteromedial left knee likely represents a Baker's cyst. No significant knee effusion is identified in the upper knee joint on recent MRI.      MR-FEMUR-W/O LEFT   Final Result      1.  Limited exam secondary to significant patient motion.   2.  Subacute fracture involving the left pubic bone with osteomyelitis and probable intraosseous abscess.   3.  Extensive edema and enlargement of the muscles of the  medial compartment, pectineus muscle, and pubococcygeus muscle is consistent with known intramuscular abscesses. Fluid collection is most extensive in the adductor longus muscle.   4.  Subacute fractures of the sacrum               Comment: Results given to Dr. Varma through [place] text at 3:56 PM      MR-PELVIS W/O   Final Result      1.  Limited exam secondary to significant patient motion.   2.  Subacute fracture involving the left pubic bone with osteomyelitis and probable intraosseous abscess.   3.  Extensive edema and enlargement of the muscles of the medial compartment, pectineus muscle, and pubococcygeus muscle is consistent with known intramuscular abscesses. Fluid collection is most extensive in the adductor longus muscle.   4.  Subacute fractures of the sacrum               Comment: Results given to Dr. Varma through [place] text at 3:56 PM      CT-DRAIN-HEMATOMA - SEROMA   Final Result      1.  Left inguinal region muscular abscess.   2.  CT-guided drainage and catheter placement of large collection in the left obturator externus.   3.  CT-guided aspiration of smaller collection in the left pectineus muscle.      US-RUQ   Final Result      Hepatomegaly.      Status post cholecystectomy.      Dilated portal vein.      Status post cholecystectomy.      No biliary ductal dilatation.      Limited evaluation of the pancreas which is obscured.      MR-THORACIC SPINE-W/O   Final Result      1.  No acute fracture in the thoracic spine.   2.  Mild chronic compression deformity at T11.      MR-LUMBAR SPINE-W/O   Final Result      1.  Acute bilateral sacral alae fractures.   2.  There is edema in the lower posterior paraspinal soft tissue likely representing soft tissue contusion.   3.  There is no lumbar spine fractures.         CT-LSPINE W/O PLUS RECONS   Final Result      No acute fracture or traumatic malalignment.      CT-PELVIS WITH   Final Result      1.  Recent but probably subacute fracture of the left  superior pubic ramus near the symphysis. Adjacent and just lateral to this are two complex masslike structures, the largest and most medial of which contains a fluid-filled cavity. These are    consistent with hematomata, possibly intramuscular.      US-EXTREMITY VENOUS LOWER UNILAT LEFT   Final Result      1.  No evidence of Left  lower extremity deep venous thrombosis.   2.  Two complex areas within the left groin, consistent with hematomata.            INTERPRETING LOCATION: [address], [place], [address]           Assessment/Plan  * Pubic ramus fracture (HCC)  Assessment & Plan  S/p mechanical GLF  MS Continue 15mg BID continue with oxycodone,  PRN IV morphine   PT/OT when able    Osteomyelitis ([facility])  Assessment & Plan  MRI femus shows Subacute fracture involving the left pubic bone with osteomyelitis and probable intraosseous abscess. Extensive edema and enlargement of the muscles of the medial compartment, pectineus muscle, and pubococcygeus muscle is consistent with known intramuscular abscesses. Fluid collection is most extensive in the adductor longus muscle.  ID following, resume IV antibiotics  Ortho pending surgery.      Candida rash of groin  Assessment & Plan  Improved.  Resume Diflucan    Sepsis (HCC)  Assessment & Plan  This is Sepsis Not present on admission  SIRS criteria identified on my evaluation include: Tachycardia, with heart rate greater than 90 BPM and Leukocyosis, with WBC greater than 12,000  Source is left thigh abscess  Leukocytosis resolved  CTM    Abscess of left thigh  Assessment & Plan  Multiple abscesses found on imaging CT/MRI  Cultures +MSSA  Resume IV unasyn  ID following and surgery    Hematoma of left thigh  Assessment & Plan  2/2 to GLF  Now has infected hematoma  Resume IV abx as above      Bilateral leg edema  Assessment & Plan  Stable,   Check serum albumin    Iron deficiency anemia, unspecified iron deficiency anemia type- (present on admission)  Assessment & Plan  Stable,  last admit he received IV iron.  Resume nutritional support       Essential hypertension- (present on admission)  Assessment & Plan  Well Controlled   Resume  Coreg, Losartan and lasix.     Chronic pain of right knee- (present on admission)  Assessment & Plan  Chronically on MS contin plus short acting oxycodone for breakthrough pain  Resume IV prn ,     Rheumatoid arthritis (HCC)- (present on admission)  Assessment & Plan  Continue Humira as outpatient       The total time spent face to face with this patient was about 34 mins of which 60% of time was spent on counseling, review of records including pertinent lab data and studies, as well as discussing diagnostic evaluation and work up, planned therapeutic interventions and future disposition of care. Where indicated, the assessment and plan reflect discussion of patient with consultants, other healthcare providers, family members, and additional research needed to obtain further information in formulating the plan of care of this patient.       VTE prophylaxis: lovenox

## 2019-10-08 NOTE — PROGRESS NOTES
Medicated numerous times with pain meds. respers 18, sitting up in bed watching tv. Cream applied to abdominal folds and cloth placed in between.

## 2019-10-08 NOTE — PROCEDURES
PICC Insertion Final 3CG    Consents confirmed, vessel patency confirmed with ultrasound. Risks and benefits of procedure explained to patient/family and education regarding central line associated bloodstream infections provided. Questions answered.     PICC placed in LUE per MD order with ultrasound guidance. 4 Fr, single lumen PICC placed in basilic vein after one attempt(s). 4 cc's of 1% lidocaine injected intradermally, 21 gauge microintroducer needle and modified Seldinger technique used. 55 cm catheter inserted with good blood return. Secured at 2 cm marker. Each lumen flushed without resistance with 10 mL 0.9% normal saline. PICC line secured with Biopatch and Tegaderm.    PICC placement is confirmed by nurse using 3CG technology. PICC line is appropriate for use at this time. Pt tolerated procedure well.  Patient condition relayed to unit RN or ordering physician via this post procedure note in the EMR.     [employer] Power PICC ref # [id number], Lot # [id number]

## 2019-10-08 NOTE — CARE PLAN
Problem: Communication  Goal: The ability to communicate needs accurately and effectively will improve  Outcome: PROGRESSING AS EXPECTED     Problem: Safety  Goal: Will remain free from injury  Outcome: PROGRESSING AS EXPECTED  Goal: Will remain free from falls  Outcome: PROGRESSING AS EXPECTED    Pt uses call light for assistance. Pt able to communicate needs and questions effectively and states understanding of plans.

## 2019-10-08 NOTE — PROGRESS NOTES
Infectious Disease Progress Note    Author: Jessica Goldman M.D. Date & Time of service: 10/8/2019  10:26 AM    Chief Complaint:  Follow-up for left thigh abscess    Interval History:  44 y.o. man with a history of rheumatoid arthritis on Humira, chronic right knee pain with plans for total right knee arthroplasty, and recent fall sustained 1 month prior to admission admitted for worsening left thigh/groin pain.  Patient found to have multiple fluid collections on imaging which were concerning for an abscess.  He underwent CT-guided drainage on 10/2 with cultures growing MSSA.    10/6 afebrile WBC 12.7 ongoing left groin pain however the crampy pain is somewhat decreased and he is able to ambulate better.  Plan for MRI of the pelvis and femur today  10/7 afebrile WBC 11.4 patient is very anxious after hearing about results of MRI.  MRI of the pelvis reveals osteomyelitis.  Awaiting surgical evaluation for possible surgical debridement.  Patient states left thigh pain remains unchanged and requiring multiple pain medications.  Pain worse with standing  10/8 afebrile WBC 8 patient is agreeable to surgery.  Repeat CT scan shows interval decrease and left thigh abscess.  Minimal output in drain.  Waiting for PICC line    Labs Reviewed.    Review of Systems:  Review of Systems   Constitutional: Negative for chills and fever.   Respiratory: Negative for cough and shortness of breath.    Gastrointestinal: Negative for abdominal pain, diarrhea, nausea and vomiting.   Musculoskeletal: Positive for myalgias.   Skin: Positive for rash.   Neurological: Negative for dizziness and headaches.   All other systems reviewed and are negative.      Hemodynamics:  Temp (24hrs), Av.7 °C (98.1 °F), Min:36.6 °C (97.9 °F), Max:36.8 °C (98.3 °F)  Temperature: 36.7 °C (98 °F)  Pulse  Av.5  Min: 50  Max: 105   Blood Pressure: 115/71       Physical Exam:  Physical Exam   Constitutional: He is oriented to person, place, and time. He  appears well-developed.   Pallor   HENT:   Mouth/Throat: No oropharyngeal exudate.   Eyes: Pupils are equal, round, and reactive to light. Conjunctivae and EOM are normal.   Neck: Neck supple.   Cardiovascular: Normal rate, regular rhythm and normal heart sounds.   Pulmonary/Chest: Effort normal and breath sounds normal.   Abdominal: Soft.   Intertrigo in skin folds   Musculoskeletal: He exhibits tenderness. He exhibits no edema.   Left groin/thigh drain  + Tenderness to palpation in left groin.  No edema    Candidiasis on plantar aspect of both feet   Neurological: He is alert and oriented to person, place, and time. No cranial nerve deficit.   Skin: Skin is warm and dry.   Psychiatric:   Pleasant  Anxious       Meds:    Current Facility-Administered Medications:   •  oxyCODONE immediate-release  •  fluconazole  •  enoxaparin  •  miconazole  •  ampicillin-sulbactam (UNASYN) IV  •  vitamin D  •  calcium citrate  •  morphine ER  •  ferrous sulfate  •  furosemide  •  losartan  •  omeprazole  •  pregabalin  •  senna-docusate **AND** polyethylene glycol/lytes **AND** magnesium hydroxide **AND** bisacodyl  •  acetaminophen  •  nicotine **AND** Nicotine Replacement Patient Education Materials **AND** nicotine polacrilex  •  morphine injection    Labs:  Recent Labs     10/06/19  0333 10/07/19  0331 10/08/19  0421   WBC 12.7* 11.4* 8.0   RBC 3.32* 3.46* 3.45*   HEMOGLOBIN 8.8* 9.0* 9.0*   HEMATOCRIT 29.2* 30.5* 30.3*   MCV 88.0 88.2 87.8   MCH 26.5* 26.0* 26.1*   RDW 65.6* 64.7* 65.7*   PLATELETCT 348 351 352   MPV 8.6* 8.3* 8.4*   NEUTSPOLYS 59.40 55.10 54.10   LYMPHOCYTES 21.90* 25.30 29.40   MONOCYTES 8.10 9.30 9.40   EOSINOPHILS 0.90 1.10 0.80   BASOPHILS 0.90 1.20 1.10     Recent Labs     10/06/19  0333 10/07/19  0331 10/08/19  0421   SODIUM 136 132* 138   POTASSIUM 4.2 4.1 4.3   CHLORIDE 101 97 99   CO2 23 25 28   GLUCOSE 82 77 91   BUN 13 13 12     Recent Labs     10/06/19  0333 10/07/19  0331 10/08/19  0426    CREATININE 0.92 0.69 0.68       Imaging:  Ct-drain-hematoma - Seroma    Result Date: 10/2/2019  10/2/2019 12:14 PM HISTORY/REASON FOR EXAM:  Left inguinal fluid collection. Moderate sedation was administered with continuous monitoring of the patient under the direct supervision of  Total sedation time: 30 minutes The biopsy/drainage was done utilizing low dose optimization CT techniques including auto modulation for  imaging and low mA CT/fluoroscopy mode for the procedure. PROCEDURE: After the informed consent the patient was placed on the CT table on supine position. The skin over the left groin was prepped. Localization CT scan demonstrates fluid collection in the left obturator externus and pectineus muscles. Subsequently after injecting lidocaine needle was advanced into the larger fluid collection. Pus was aspirated. Subsequently a wire was introduced. After dilating the tract an 8 Arabic drain was introduced and placed with its loop in the fluid collection. An approximately 100 mL of fluid was aspirated. Subsequently under the needle was advanced into the superficial/pectineus muscle fluid collection. An approximately 10 mL of fluid was aspirated. No drain was placed in the superficial fluid collection. A sample of the material was sent to the microbe allergy. The patient tolerated the procedure without any immediate complication.     1.  Left inguinal region muscular abscess. 2.  CT-guided drainage and catheter placement of large collection in the left obturator externus. 3.  CT-guided aspiration of smaller collection in the left pectineus muscle.    Ct-lspine W/o Plus Recons    Result Date: 9/28/2019 9/28/2019 4:58 AM HISTORY/REASON FOR EXAM: Back pain after recent fall TECHNIQUE/EXAM DESCRIPTION: CT lumbar spine without contrast, with reconstructions. Noncontrast helical scanning was obtained from T12 through the sacrum.  Sagittal reconstructions were generated from the axial images.  Low dose optimization technique was utilized for this CT exam including automated exposure control and adjustment of the mA and/or kV according to patient size. COMPARISON:  None. FINDINGS: The bones are markedly osteopenic. There is a minimal compression deformity of the superior endplate of the L2 body, which is old. There is no evidence of acute fracture. There is multilevel interspace and facet arthropathy.     No acute fracture or traumatic malalignment.    Ct-pelvis With    Result Date: 9/28/2019 9/28/2019 3:21 AM HISTORY/REASON FOR EXAM:  significant left groin pain, possible hematoma on us. TECHNIQUE/EXAM DESCRIPTION AND NUMBER OF VIEWS: CT scan of the pelvis with contrast. Contrast-enhanced helical scanning of the pelvis was obtained from the iliac crests through the pubic symphysis following the bolus administration of 100 mL of Omnipaque 350 nonionic contrast without complication. Low dose optimization technique was utilized for this CT exam including automated exposure control and adjustment of the mA and/or kV according to patient size. COMPARISON:  None. FINDINGS: In the deep left inguinal region, there is a masslike structure containing irregular fluid-filled cavity, which measures overall about 8.6 cm in longest dimension. Within the cavity or focal areas of increased attenuation consistent with calcification. This lesion is immediately adjacent to a recent no probably subacute fracture of the left superior pubic ramus near the symphysis. Lateral to this lesion is a smaller complex mass measuring about 7.3 cm maximum dimension. There is no abnormality within the visualized pelvic cavity and there is no free intraperitoneal fluid. The bladder is intact.     1.  Recent but probably subacute fracture of the left superior pubic ramus near the symphysis. Adjacent and just lateral to this are two complex masslike structures, the largest and most medial of which contains a fluid-filled cavity. These are  consistent with hematomata, possibly intramuscular.    Dx-lumbar Spine-2 Or 3 Views    Result Date: 9/16/2019 9/16/2019 4:15 PM HISTORY/REASON FOR EXAM:  Atraumatic Pain TECHNIQUE/ EXAM DESCRIPTION AND NUMBER OF VIEWS:  3 views of the lumbar spine. COMPARISON: None. FINDINGS: Dextroconvex lumbar curvature. Evaluation of the sacrum is limited due to overlying bowel content. No acute fracture. No malalignment. No compression deformity. Mild to moderate degenerative change of the lumbar spine.     Dextroconvex lumbar curvature. No acute osseous abnormality. No malalignment.    Dx-thoracic Spine-with Swimmers View    Result Date: 9/16/2019 9/16/2019 4:15 PM HISTORY/REASON FOR EXAM:  Atraumatic Pain; ?scoliotic MId back pain and low back pain after GLF x 2 weeks TECHNIQUE/EXAM DESCRIPTION AND NUMBER OF VIEWS:  Thoracic spine, 3 views. COMPARISON:  None. FINDINGS: Diffuse osseous demineralization, limiting evaluation for nondisplaced fracture. No acute fracture. No malalignment. Mild chronic height loss of the mid thoracic spine from T9 to T11. Mild degenerative change of the thoracic spine.     Mild chronic height loss of the mid thoracic spine from T9 to T11.    Mr-lumbar Spine-w/o    Result Date: 9/28/2019 9/28/2019 6:21 PM HISTORY/REASON FOR EXAM:  back pain. TECHNIQUE/EXAM DESCRIPTION: MRI of the lumbar spine without contrast. The study was performed on a [employer] Signa 1.5 Mae MRI scanner. T1 sagittal, T2 fast spin-echo sagittal, and T2 axial images were obtained of the lumbar spine. COMPARISON:  None. FINDINGS: There are bilateral sacral alar fractures. The lumbar spine maintains normal height and alignment. At the level of L5-S1, there is no spinal or neural foraminal stenosis. At the level of L4-5, there is no spinal or neural foraminal stenosis. At the level of L3-4, there is no spinal or neural foraminal stenosis. At the level of the L2-3, there is no spinal or neural foraminal stenosis. At the level of L1-2,  there is no spinal or neural foraminal stenosis. The conus terminates at the level of L1. The visualized lower thoracic spinal cord appear normal. There is edema in the posterior paraspinal soft tissue likely representing soft tissue contusion.     1.  Acute bilateral sacral alae fractures. 2.  There is edema in the lower posterior paraspinal soft tissue likely representing soft tissue contusion. 3.  There is no lumbar spine fractures.     Mr-thoracic Spine-w/o    Result Date: 9/28/2019 9/28/2019 6:21 PM HISTORY/REASON FOR EXAM:  back pain TECHNIQUE/EXAM DESCRIPTION: MRI of the thoracic spine without contrast. The study was performed on a [employer] Sign[employer] 1.5 Mae MRI scanner. T1 sagittal, T2 fast spin-echo sagittal, and T2 axial images were obtained of the thoracic spine. COMPARISON: None. FINDINGS: There is mild chronic compression deformity at T11. There is no fracture or dislocation. There is no pathologic marrow infiltration. There is no intradural extramedullary lesion. There is no abnormal intramedullary T2 signal intensity in the thoracic spinal cord.     1.  No acute fracture in the thoracic spine. 2.  Mild chronic compression deformity at T11.    Us-extremity Venous Lower Unilat Left    Result Date: 9/28/2019  HISTORY/REASON FOR EXAM:  Leg swelling after fall TECHNIQUE/EXAM DESCRIPTION: Left lower extremity doppler venous ultrasound was performed. Using both color and pulse-wave Doppler imaging, multiple images were obtained from the common femoral vein origins distally through the popliteal trifurcations.  9/28/2019 12:54 AM COMPARISON:  None. FINDINGS: REAL-TIME GRAY-SCALE IMAGING: Real-time gray-scale imaging reveals no evidence of focal wall thickening. COLOR AND DUPLEX DOPPLER IMAGING: Graded compression was used to demonstrate patent lumens.  There is no evidence for luminal thrombus.  There is normal response to augmentation and respiratory variation. There are two rounded complex areas in the left  groin region, the largest measuring about 10.9 cm diameter and the smaller, about 8.3 cm in diameter, both of which are consistent with hematomata.     1.  No evidence of Left  lower extremity deep venous thrombosis. 2.  Two complex areas within the left groin, consistent with hematomata. INTERPRETING LOCATION: [address] [place] [address], [id number]    Us-ruq    Result Date: 10/1/2019  10/1/2019 5:49 PM HISTORY/REASON FOR EXAM:  Edema Abdominal pain TECHNIQUE/EXAM DESCRIPTION AND NUMBER OF VIEWS:  Real-time sonography of the liver and biliary tree. COMPARISON: None FINDINGS: The liver is normal in contour. There is no evidence of solid mass lesion. The liver measures 20.15 cm. The gallbladder is surgically absent. The common duct measures 0.44 cm. The pancreas is obscured by bowel gas. The visualized aorta is normal in caliber. Intrahepatic IVC is patent. The portal vein is patent with hepatopetal flow. The MPV measures 1.57 cm. The right kidney measures 11.06 cm. There is no hydronephrosis. There is no ascites.     Hepatomegaly. Status post cholecystectomy. Dilated portal vein. Status post cholecystectomy. No biliary ductal dilatation. Limited evaluation of the pancreas which is obscured.      Micro:  Results     Procedure Component Value Units Date/Time    Blood Culture [[id number]] Collected:  10/02/19 1540    Order Status:  Completed Specimen:  Blood from Peripheral Updated:  10/07/19 1817     Significant Indicator NEG     Source BLD     Site Peripheral     Culture Result No growth after 5 days of incubation.  Blood culture testing and Gram stain, if indicated, are  performed at [facility], [address] [place] [address].  Positive blood cultures are  sent to [facility], [address] [place] [address], for organism identification and  susceptibility testing.      Narrative:       From different peripheral sites, if not done within the last  24 hours (Per Hospital Policy:  "Only change specimen source to  \"Line\" if specified by physician order)  Right AC    Blood Culture [[id number]] Collected:  10/02/19 1541    Order Status:  Completed Specimen:  Blood from Peripheral Updated:  10/07/19 1817     Significant Indicator NEG     Source BLD     Site Peripheral     Culture Result No growth after 5 days of incubation.  Blood culture testing and Gram stain, if indicated, are  performed at [facility], [address] [place] [address].  Positive blood cultures are  sent to [facility], [address] [place] [address], for organism identification and  susceptibility testing.      Narrative:       From different peripheral sites, if not done within the last  24 hours (Per Hospital Policy: Only change specimen source to  \"Line\" if specified by physician order)  Left Hand    Anaerobic Culture [[id number]] Collected:  10/02/19 1330    Order Status:  Completed Specimen:  Wound Updated:  10/05/19 1019     Significant Indicator NEG     Source WND     Site Left Thigh Abscess     Culture Result No Anaerobes isolated.    Narrative:       Radiology specimen    CULTURE WOUND W/ GRAM STAIN [[id number]]  (Abnormal)  (Susceptibility) Collected:  10/02/19 1330    Order Status:  Completed Specimen:  Wound Updated:  10/05/19 1019     Significant Indicator POS     Source WND     Site Left Thigh Abscess     Culture Result -     Gram Stain Result Many WBCs.  Moderate Gram positive cocci.  Rare Gram negative rods.       Culture Result Staphylococcus aureus  Moderate growth      Narrative:       Radiology specimen    Susceptibility     Staphylococcus aureus (1)     Antibiotic Interpretation Microscan Method Status    Clindamycin Sensitive <=0.5 mcg/mL MARÍA Final    Daptomycin Sensitive <=0.5 mcg/mL MARÍA Final    Erythromycin Sensitive <=0.5 mcg/mL MARÍA Final    Ampicillin/sulbactam Sensitive <=8/4 mcg/mL MARÍA Final    Moxifloxacin Intermediate 4 mcg/mL MARÍA Final    Vancomycin " Sensitive 1 mcg/mL MARÍA Final    Oxacillin Sensitive <=0.25 mcg/mL MARÍA Final    Trimeth/Sulfa Sensitive <=0.5/9.5 mcg/mL MARÍA Final    Tetracycline Sensitive <=4 mcg/mL MARÍA Final                   GRAM STAIN [[id number]] Collected:  10/02/19 1330    Order Status:  Completed Specimen:  Wound Updated:  10/03/19 0107     Significant Indicator .     Source WND     Site Left Thigh Abscess     Gram Stain Result Many WBCs.  Moderate Gram positive cocci.  Rare Gram negative rods.      Narrative:       Radiology specimen    FLUID CULTURE W/GRAM STAIN [[id number]] Collected:  10/02/19 1330    Order Status:  Canceled Specimen:  Other     FLUID CULTURE [[id number]]     Order Status:  No result Specimen:  Other Body Fluid           Assessment:  Active Hospital Problems    Diagnosis   • *Pubic ramus fracture ([facility]) [S32.599A]   • Essential hypertension [I10]   • Rheumatoid arthritis ([facility]) [M06.9]   • Chronic pain of right knee [M25.561, G89.29]   • Candida rash of groin [B37.89]   • Abscess of left thigh [L02.416]   • Sepsis ([facility]) [A41.9]   • Hematoma of left thigh [S70.12XA]       Plan:  Osteomyelitis of the left pubic bone, new (noted on MRI)  Status post recent fall found to have subacute fracture on presentation  Appreciate Ortho evaluation  ESR - 55  Anticipate a PICC line and 6 weeks of IV antibiotics  Continue antibiotics per below  Pt agreeable to surgery  Please obtain bone biopsy and send for pathology    Abscess of left thigh, improved  No fevers  Persistent leukocytosis, now resolved  Sustained recent fall 1 month prior to admission  Blood cultures-negative to date  Status post CT-guided drainage on 10/2  Cultures+ MSSA.  Gram stain+ GNR, GPC  Continue IV Unasyn  Repeat CT on 10/7-interval decrease in the left pelvis and thigh intramuscular abscess    Leukocytosis, persistent.  Resolved    Candidiasis.  On treatment  Continue p.o. fluconazole 400 mg daily  Plan 7-day course total.  Stop date 10/11/2019    Rheumatoid  arthritis  On Humira prior to admission  Chronically immunosuppressed    Chronic right knee pain  Plan for future total right knee arthroplasty    Recent fall  Approximately 1 month prior to admission  Imaging on presentation revealed subacute fracture of the left pubic bone    I have performed a physical exam and reviewed and updated ROS and plan today 10/8/2019.  In review of yesterday's note 10/7/2019, there are no changes except as documented above.    Disposition: Hospitalist recommending SNF    Plan of care discussed with internal medicine/Dr. Perry and FAITH Haramn

## 2019-10-08 NOTE — PROGRESS NOTES
Report from NOC RN. Pt awake and alert. No needs, states pain will medicate.    PICC RN called pt to get PICC at 1115: Pt up to take shower. ID rounded, pending hospitalist to round. Pending possible bone biopsy from pubic bone for osteo.

## 2019-10-09 ENCOUNTER — ANESTHESIA EVENT (OUTPATIENT)
Dept: SURGERY | Facility: MEDICAL CENTER | Age: 44
DRG: 515 | End: 2019-10-09
Payer: COMMERCIAL

## 2019-10-09 ENCOUNTER — ANESTHESIA (OUTPATIENT)
Dept: SURGERY | Facility: MEDICAL CENTER | Age: 44
DRG: 515 | End: 2019-10-09
Payer: COMMERCIAL

## 2019-10-09 LAB
ANION GAP SERPL CALC-SCNC: 11 MMOL/L (ref 0–11.9)
BASOPHILS # BLD AUTO: 1.1 % (ref 0–1.8)
BASOPHILS # BLD: 0.07 K/UL (ref 0–0.12)
BUN SERPL-MCNC: 11 MG/DL (ref 8–22)
CALCIUM SERPL-MCNC: 8.5 MG/DL (ref 8.4–10.2)
CHLORIDE SERPL-SCNC: 97 MMOL/L (ref 96–112)
CO2 SERPL-SCNC: 27 MMOL/L (ref 20–33)
CREAT SERPL-MCNC: 0.6 MG/DL (ref 0.5–1.4)
EOSINOPHIL # BLD AUTO: 0.09 K/UL (ref 0–0.51)
EOSINOPHIL NFR BLD: 1.4 % (ref 0–6.9)
ERYTHROCYTE [DISTWIDTH] IN BLOOD BY AUTOMATED COUNT: 66 FL (ref 35.9–50)
GLUCOSE SERPL-MCNC: 100 MG/DL (ref 65–99)
GRAM STN SPEC: NORMAL
HCT VFR BLD AUTO: 29.3 % (ref 42–52)
HGB BLD-MCNC: 8.8 G/DL (ref 14–18)
IMM GRANULOCYTES # BLD AUTO: 0.32 K/UL (ref 0–0.11)
IMM GRANULOCYTES NFR BLD AUTO: 4.8 % (ref 0–0.9)
LYMPHOCYTES # BLD AUTO: 1.99 K/UL (ref 1–4.8)
LYMPHOCYTES NFR BLD: 30.2 % (ref 22–41)
MCH RBC QN AUTO: 26.3 PG (ref 27–33)
MCHC RBC AUTO-ENTMCNC: 30 G/DL (ref 33.7–35.3)
MCV RBC AUTO: 87.7 FL (ref 81.4–97.8)
MONOCYTES # BLD AUTO: 0.71 K/UL (ref 0–0.85)
MONOCYTES NFR BLD AUTO: 10.8 % (ref 0–13.4)
NEUTROPHILS # BLD AUTO: 3.42 K/UL (ref 1.82–7.42)
NEUTROPHILS NFR BLD: 51.7 % (ref 44–72)
NRBC # BLD AUTO: 0 K/UL
NRBC BLD-RTO: 0 /100 WBC
PLATELET # BLD AUTO: 344 K/UL (ref 164–446)
PMV BLD AUTO: 8.4 FL (ref 9–12.9)
POTASSIUM SERPL-SCNC: 4.4 MMOL/L (ref 3.6–5.5)
RBC # BLD AUTO: 3.34 M/UL (ref 4.7–6.1)
SIGNIFICANT IND 70042: NORMAL
SITE SITE: NORMAL
SODIUM SERPL-SCNC: 135 MMOL/L (ref 135–145)
SOURCE SOURCE: NORMAL
WBC # BLD AUTO: 6.6 K/UL (ref 4.8–10.8)

## 2019-10-09 PROCEDURE — A9270 NON-COVERED ITEM OR SERVICE: HCPCS | Performed by: HOSPITALIST

## 2019-10-09 PROCEDURE — A9270 NON-COVERED ITEM OR SERVICE: HCPCS | Performed by: ANESTHESIOLOGY

## 2019-10-09 PROCEDURE — 87077 CULTURE AEROBIC IDENTIFY: CPT

## 2019-10-09 PROCEDURE — 700111 HCHG RX REV CODE 636 W/ 250 OVERRIDE (IP)

## 2019-10-09 PROCEDURE — 99233 SBSQ HOSP IP/OBS HIGH 50: CPT | Performed by: INTERNAL MEDICINE

## 2019-10-09 PROCEDURE — 99233 SBSQ HOSP IP/OBS HIGH 50: CPT | Performed by: HOSPITALIST

## 2019-10-09 PROCEDURE — 160009 HCHG ANES TIME/MIN: Performed by: ORTHOPAEDIC SURGERY

## 2019-10-09 PROCEDURE — 770001 HCHG ROOM/CARE - MED/SURG/GYN PRIV*

## 2019-10-09 PROCEDURE — 87015 SPECIMEN INFECT AGNT CONCNTJ: CPT

## 2019-10-09 PROCEDURE — 160036 HCHG PACU - EA ADDL 30 MINS PHASE I: Performed by: ORTHOPAEDIC SURGERY

## 2019-10-09 PROCEDURE — 700102 HCHG RX REV CODE 250 W/ 637 OVERRIDE(OP): Performed by: ANESTHESIOLOGY

## 2019-10-09 PROCEDURE — 88304 TISSUE EXAM BY PATHOLOGIST: CPT

## 2019-10-09 PROCEDURE — 700105 HCHG RX REV CODE 258: Performed by: INTERNAL MEDICINE

## 2019-10-09 PROCEDURE — 85025 COMPLETE CBC W/AUTO DIFF WBC: CPT

## 2019-10-09 PROCEDURE — A6402 STERILE GAUZE <= 16 SQ IN: HCPCS | Performed by: ORTHOPAEDIC SURGERY

## 2019-10-09 PROCEDURE — 501838 HCHG SUTURE GENERAL: Performed by: ORTHOPAEDIC SURGERY

## 2019-10-09 PROCEDURE — 88311 DECALCIFY TISSUE: CPT

## 2019-10-09 PROCEDURE — 700111 HCHG RX REV CODE 636 W/ 250 OVERRIDE (IP): Performed by: ANESTHESIOLOGY

## 2019-10-09 PROCEDURE — 160035 HCHG PACU - 1ST 60 MINS PHASE I: Performed by: ORTHOPAEDIC SURGERY

## 2019-10-09 PROCEDURE — 700102 HCHG RX REV CODE 250 W/ 637 OVERRIDE(OP): Performed by: HOSPITALIST

## 2019-10-09 PROCEDURE — 700111 HCHG RX REV CODE 636 W/ 250 OVERRIDE (IP): Performed by: HOSPITALIST

## 2019-10-09 PROCEDURE — 160048 HCHG OR STATISTICAL LEVEL 1-5: Performed by: ORTHOPAEDIC SURGERY

## 2019-10-09 PROCEDURE — 700105 HCHG RX REV CODE 258: Performed by: ORTHOPAEDIC SURGERY

## 2019-10-09 PROCEDURE — 87186 SC STD MICRODIL/AGAR DIL: CPT

## 2019-10-09 PROCEDURE — 160038 HCHG SURGERY MINUTES - EA ADDL 1 MIN LEVEL 2: Performed by: ORTHOPAEDIC SURGERY

## 2019-10-09 PROCEDURE — A9270 NON-COVERED ITEM OR SERVICE: HCPCS | Performed by: INTERNAL MEDICINE

## 2019-10-09 PROCEDURE — 500881 HCHG PACK, EXTREMITY: Performed by: ORTHOPAEDIC SURGERY

## 2019-10-09 PROCEDURE — 700101 HCHG RX REV CODE 250: Performed by: ANESTHESIOLOGY

## 2019-10-09 PROCEDURE — 0QB30ZZ EXCISION OF LEFT PELVIC BONE, OPEN APPROACH: ICD-10-PCS | Performed by: ORTHOPAEDIC SURGERY

## 2019-10-09 PROCEDURE — 80048 BASIC METABOLIC PNL TOTAL CA: CPT

## 2019-10-09 PROCEDURE — 87070 CULTURE OTHR SPECIMN AEROBIC: CPT

## 2019-10-09 PROCEDURE — 700105 HCHG RX REV CODE 258: Performed by: ANESTHESIOLOGY

## 2019-10-09 PROCEDURE — 160027 HCHG SURGERY MINUTES - 1ST 30 MINS LEVEL 2: Performed by: ORTHOPAEDIC SURGERY

## 2019-10-09 PROCEDURE — 87205 SMEAR GRAM STAIN: CPT

## 2019-10-09 PROCEDURE — A6222 GAUZE <=16 IN NO W/SAL W/O B: HCPCS | Performed by: ORTHOPAEDIC SURGERY

## 2019-10-09 PROCEDURE — 87075 CULTR BACTERIA EXCEPT BLOOD: CPT

## 2019-10-09 PROCEDURE — 160002 HCHG RECOVERY MINUTES (STAT): Performed by: ORTHOPAEDIC SURGERY

## 2019-10-09 PROCEDURE — 700111 HCHG RX REV CODE 636 W/ 250 OVERRIDE (IP): Performed by: ORTHOPAEDIC SURGERY

## 2019-10-09 PROCEDURE — 700111 HCHG RX REV CODE 636 W/ 250 OVERRIDE (IP): Performed by: INTERNAL MEDICINE

## 2019-10-09 PROCEDURE — 700102 HCHG RX REV CODE 250 W/ 637 OVERRIDE(OP): Performed by: INTERNAL MEDICINE

## 2019-10-09 RX ORDER — HYDROMORPHONE HYDROCHLORIDE 2 MG/ML
0.1 INJECTION, SOLUTION INTRAMUSCULAR; INTRAVENOUS; SUBCUTANEOUS
Status: DISCONTINUED | OUTPATIENT
Start: 2019-10-09 | End: 2019-10-10 | Stop reason: HOSPADM

## 2019-10-09 RX ORDER — LABETALOL HYDROCHLORIDE 5 MG/ML
5 INJECTION, SOLUTION INTRAVENOUS
Status: DISCONTINUED | OUTPATIENT
Start: 2019-10-09 | End: 2019-10-10 | Stop reason: HOSPADM

## 2019-10-09 RX ORDER — SODIUM CHLORIDE, SODIUM LACTATE, POTASSIUM CHLORIDE, CALCIUM CHLORIDE 600; 310; 30; 20 MG/100ML; MG/100ML; MG/100ML; MG/100ML
INJECTION, SOLUTION INTRAVENOUS CONTINUOUS
Status: DISCONTINUED | OUTPATIENT
Start: 2019-10-09 | End: 2019-10-10 | Stop reason: HOSPADM

## 2019-10-09 RX ORDER — OXYCODONE HCL 5 MG/5 ML
10 SOLUTION, ORAL ORAL
Status: DISCONTINUED | OUTPATIENT
Start: 2019-10-09 | End: 2019-10-10 | Stop reason: HOSPADM

## 2019-10-09 RX ORDER — OXYCODONE HCL 5 MG/5 ML
5 SOLUTION, ORAL ORAL EVERY 4 HOURS PRN
Status: DISCONTINUED | OUTPATIENT
Start: 2019-10-09 | End: 2019-10-09 | Stop reason: HOSPADM

## 2019-10-09 RX ORDER — MEPERIDINE HYDROCHLORIDE 25 MG/ML
6.25 INJECTION INTRAMUSCULAR; INTRAVENOUS; SUBCUTANEOUS
Status: DISCONTINUED | OUTPATIENT
Start: 2019-10-09 | End: 2019-10-10 | Stop reason: HOSPADM

## 2019-10-09 RX ORDER — OXYCODONE HCL 5 MG/5 ML
10 SOLUTION, ORAL ORAL
Status: DISCONTINUED | OUTPATIENT
Start: 2019-10-09 | End: 2019-10-09 | Stop reason: HOSPADM

## 2019-10-09 RX ORDER — HALOPERIDOL 5 MG/ML
1 INJECTION INTRAMUSCULAR
Status: DISCONTINUED | OUTPATIENT
Start: 2019-10-09 | End: 2019-10-09 | Stop reason: HOSPADM

## 2019-10-09 RX ORDER — HYDRALAZINE HYDROCHLORIDE 20 MG/ML
5 INJECTION INTRAMUSCULAR; INTRAVENOUS
Status: DISCONTINUED | OUTPATIENT
Start: 2019-10-09 | End: 2019-10-10 | Stop reason: HOSPADM

## 2019-10-09 RX ORDER — HYDROMORPHONE HYDROCHLORIDE 2 MG/ML
0.4 INJECTION, SOLUTION INTRAMUSCULAR; INTRAVENOUS; SUBCUTANEOUS
Status: DISCONTINUED | OUTPATIENT
Start: 2019-10-09 | End: 2019-10-10 | Stop reason: HOSPADM

## 2019-10-09 RX ORDER — HYDROMORPHONE HYDROCHLORIDE 2 MG/ML
0.2 INJECTION, SOLUTION INTRAMUSCULAR; INTRAVENOUS; SUBCUTANEOUS
Status: DISCONTINUED | OUTPATIENT
Start: 2019-10-09 | End: 2019-10-10 | Stop reason: HOSPADM

## 2019-10-09 RX ORDER — DIAZEPAM 5 MG/ML
5 INJECTION, SOLUTION INTRAMUSCULAR; INTRAVENOUS PRN
Status: COMPLETED | OUTPATIENT
Start: 2019-10-09 | End: 2019-10-09

## 2019-10-09 RX ORDER — OXYCODONE HCL 5 MG/5 ML
5 SOLUTION, ORAL ORAL
Status: DISCONTINUED | OUTPATIENT
Start: 2019-10-09 | End: 2019-10-10 | Stop reason: HOSPADM

## 2019-10-09 RX ORDER — LIDOCAINE HYDROCHLORIDE 20 MG/ML
INJECTION, SOLUTION EPIDURAL; INFILTRATION; INTRACAUDAL; PERINEURAL PRN
Status: DISCONTINUED | OUTPATIENT
Start: 2019-10-09 | End: 2019-10-09 | Stop reason: SURG

## 2019-10-09 RX ORDER — LABETALOL HYDROCHLORIDE 5 MG/ML
5 INJECTION, SOLUTION INTRAVENOUS
Status: DISCONTINUED | OUTPATIENT
Start: 2019-10-09 | End: 2019-10-09 | Stop reason: HOSPADM

## 2019-10-09 RX ORDER — SODIUM CHLORIDE, SODIUM LACTATE, POTASSIUM CHLORIDE, AND CALCIUM CHLORIDE .6; .31; .03; .02 G/100ML; G/100ML; G/100ML; G/100ML
500 INJECTION, SOLUTION INTRAVENOUS
Status: DISCONTINUED | OUTPATIENT
Start: 2019-10-09 | End: 2019-10-09 | Stop reason: HOSPADM

## 2019-10-09 RX ORDER — MEPERIDINE HYDROCHLORIDE 25 MG/ML
6.25 INJECTION INTRAMUSCULAR; INTRAVENOUS; SUBCUTANEOUS
Status: DISCONTINUED | OUTPATIENT
Start: 2019-10-09 | End: 2019-10-09 | Stop reason: HOSPADM

## 2019-10-09 RX ORDER — HALOPERIDOL 5 MG/ML
1 INJECTION INTRAMUSCULAR
Status: DISCONTINUED | OUTPATIENT
Start: 2019-10-09 | End: 2019-10-10 | Stop reason: HOSPADM

## 2019-10-09 RX ORDER — ONDANSETRON 2 MG/ML
4 INJECTION INTRAMUSCULAR; INTRAVENOUS
Status: DISCONTINUED | OUTPATIENT
Start: 2019-10-09 | End: 2019-10-10 | Stop reason: HOSPADM

## 2019-10-09 RX ORDER — HYDROMORPHONE HYDROCHLORIDE 1 MG/ML
0.4 INJECTION, SOLUTION INTRAMUSCULAR; INTRAVENOUS; SUBCUTANEOUS
Status: DISCONTINUED | OUTPATIENT
Start: 2019-10-09 | End: 2019-10-09 | Stop reason: HOSPADM

## 2019-10-09 RX ORDER — HYDROMORPHONE HYDROCHLORIDE 1 MG/ML
0.2 INJECTION, SOLUTION INTRAMUSCULAR; INTRAVENOUS; SUBCUTANEOUS
Status: DISCONTINUED | OUTPATIENT
Start: 2019-10-09 | End: 2019-10-09 | Stop reason: HOSPADM

## 2019-10-09 RX ORDER — HYDROMORPHONE HYDROCHLORIDE 1 MG/ML
0.1 INJECTION, SOLUTION INTRAMUSCULAR; INTRAVENOUS; SUBCUTANEOUS
Status: DISCONTINUED | OUTPATIENT
Start: 2019-10-09 | End: 2019-10-09 | Stop reason: HOSPADM

## 2019-10-09 RX ORDER — ONDANSETRON 2 MG/ML
4 INJECTION INTRAMUSCULAR; INTRAVENOUS
Status: DISCONTINUED | OUTPATIENT
Start: 2019-10-09 | End: 2019-10-09 | Stop reason: HOSPADM

## 2019-10-09 RX ORDER — ACETAMINOPHEN 500 MG
1000 TABLET ORAL ONCE
Status: COMPLETED | OUTPATIENT
Start: 2019-10-09 | End: 2019-10-09

## 2019-10-09 RX ORDER — SODIUM CHLORIDE, SODIUM LACTATE, POTASSIUM CHLORIDE, CALCIUM CHLORIDE 600; 310; 30; 20 MG/100ML; MG/100ML; MG/100ML; MG/100ML
INJECTION, SOLUTION INTRAVENOUS CONTINUOUS
Status: DISCONTINUED | OUTPATIENT
Start: 2019-10-09 | End: 2019-10-09 | Stop reason: HOSPADM

## 2019-10-09 RX ORDER — ONDANSETRON 2 MG/ML
INJECTION INTRAMUSCULAR; INTRAVENOUS PRN
Status: DISCONTINUED | OUTPATIENT
Start: 2019-10-09 | End: 2019-10-09 | Stop reason: SURG

## 2019-10-09 RX ORDER — DIPHENHYDRAMINE HYDROCHLORIDE 50 MG/ML
12.5 INJECTION INTRAMUSCULAR; INTRAVENOUS
Status: DISCONTINUED | OUTPATIENT
Start: 2019-10-09 | End: 2019-10-09 | Stop reason: HOSPADM

## 2019-10-09 RX ORDER — DIAZEPAM 5 MG/ML
INJECTION, SOLUTION INTRAMUSCULAR; INTRAVENOUS
Status: COMPLETED
Start: 2019-10-09 | End: 2019-10-09

## 2019-10-09 RX ORDER — GABAPENTIN 300 MG/1
300 CAPSULE ORAL ONCE
Status: COMPLETED | OUTPATIENT
Start: 2019-10-09 | End: 2019-10-09

## 2019-10-09 RX ADMIN — DIAZEPAM 5 MG: 5 INJECTION, SOLUTION INTRAMUSCULAR; INTRAVENOUS at 16:46

## 2019-10-09 RX ADMIN — AMPICILLIN AND SULBACTAM 3 G: 1; 2 INJECTION, POWDER, FOR SOLUTION INTRAMUSCULAR; INTRAVENOUS at 05:53

## 2019-10-09 RX ADMIN — HYDROMORPHONE HYDROCHLORIDE 0.4 MG: 1 INJECTION, SOLUTION INTRAMUSCULAR; INTRAVENOUS; SUBCUTANEOUS at 15:34

## 2019-10-09 RX ADMIN — SODIUM CHLORIDE, POTASSIUM CHLORIDE, SODIUM LACTATE AND CALCIUM CHLORIDE: 600; 310; 30; 20 INJECTION, SOLUTION INTRAVENOUS at 13:39

## 2019-10-09 RX ADMIN — AMPICILLIN AND SULBACTAM 3 G: 1; 2 INJECTION, POWDER, FOR SOLUTION INTRAMUSCULAR; INTRAVENOUS at 00:00

## 2019-10-09 RX ADMIN — SENNOSIDES, DOCUSATE SODIUM 2 TABLET: 50; 8.6 TABLET, FILM COATED ORAL at 18:28

## 2019-10-09 RX ADMIN — FENTANYL CITRATE 50 MCG: 0.05 INJECTION, SOLUTION INTRAMUSCULAR; INTRAVENOUS at 15:15

## 2019-10-09 RX ADMIN — FENTANYL CITRATE 50 MCG: 50 INJECTION, SOLUTION INTRAMUSCULAR; INTRAVENOUS at 14:29

## 2019-10-09 RX ADMIN — ACETAMINOPHEN 1000 MG: 500 TABLET, FILM COATED ORAL at 18:24

## 2019-10-09 RX ADMIN — NICOTINE 14 MG: 14 PATCH, EXTENDED RELEASE TRANSDERMAL at 05:53

## 2019-10-09 RX ADMIN — FENTANYL CITRATE 50 MCG: 0.05 INJECTION, SOLUTION INTRAMUSCULAR; INTRAVENOUS at 15:05

## 2019-10-09 RX ADMIN — SODIUM CHLORIDE, POTASSIUM CHLORIDE, SODIUM LACTATE AND CALCIUM CHLORIDE 1000 ML: 600; 310; 30; 20 INJECTION, SOLUTION INTRAVENOUS at 15:23

## 2019-10-09 RX ADMIN — OMEPRAZOLE 40 MG: 20 CAPSULE, DELAYED RELEASE ORAL at 17:40

## 2019-10-09 RX ADMIN — OXYCODONE HYDROCHLORIDE 10 MG: 5 SOLUTION ORAL at 15:16

## 2019-10-09 RX ADMIN — GABAPENTIN 300 MG: 300 CAPSULE ORAL at 18:24

## 2019-10-09 RX ADMIN — MORPHINE SULFATE 30 MG: 30 TABLET, EXTENDED RELEASE ORAL at 05:53

## 2019-10-09 RX ADMIN — AMPICILLIN AND SULBACTAM 3 G: 1; 2 INJECTION, POWDER, FOR SOLUTION INTRAMUSCULAR; INTRAVENOUS at 17:40

## 2019-10-09 RX ADMIN — FENTANYL CITRATE 50 MCG: 50 INJECTION, SOLUTION INTRAMUSCULAR; INTRAVENOUS at 14:18

## 2019-10-09 RX ADMIN — CEFAZOLIN 2 G: 10 INJECTION, POWDER, FOR SOLUTION INTRAVENOUS at 15:30

## 2019-10-09 RX ADMIN — LIDOCAINE HYDROCHLORIDE 100 MG: 20 INJECTION, SOLUTION EPIDURAL; INFILTRATION; INTRACAUDAL; PERINEURAL at 13:53

## 2019-10-09 RX ADMIN — AMPICILLIN AND SULBACTAM 3 G: 1; 2 INJECTION, POWDER, FOR SOLUTION INTRAMUSCULAR; INTRAVENOUS at 23:10

## 2019-10-09 RX ADMIN — AMPICILLIN AND SULBACTAM 3 G: 1; 2 INJECTION, POWDER, FOR SOLUTION INTRAMUSCULAR; INTRAVENOUS at 11:49

## 2019-10-09 RX ADMIN — MICONAZOLE NITRATE: 20 CREAM TOPICAL at 18:29

## 2019-10-09 RX ADMIN — FERROUS SULFATE TAB 325 MG (65 MG ELEMENTAL FE) 325 MG: 325 (65 FE) TAB at 05:49

## 2019-10-09 RX ADMIN — HYDROMORPHONE HYDROCHLORIDE 0.2 MG: 1 INJECTION, SOLUTION INTRAMUSCULAR; INTRAVENOUS; SUBCUTANEOUS at 16:00

## 2019-10-09 RX ADMIN — MORPHINE SULFATE 4 MG: 4 INJECTION INTRAVENOUS at 17:34

## 2019-10-09 RX ADMIN — VANCOMYCIN HYDROCHLORIDE 1500 MG: 1 INJECTION, POWDER, LYOPHILIZED, FOR SOLUTION INTRAVENOUS at 15:31

## 2019-10-09 RX ADMIN — HYDROMORPHONE HYDROCHLORIDE 0.4 MG: 1 INJECTION, SOLUTION INTRAMUSCULAR; INTRAVENOUS; SUBCUTANEOUS at 15:43

## 2019-10-09 RX ADMIN — OMEPRAZOLE 40 MG: 20 CAPSULE, DELAYED RELEASE ORAL at 05:47

## 2019-10-09 RX ADMIN — MORPHINE SULFATE 4 MG: 4 INJECTION INTRAVENOUS at 04:22

## 2019-10-09 RX ADMIN — PREGABALIN 25 MG: 25 CAPSULE ORAL at 05:47

## 2019-10-09 RX ADMIN — MORPHINE SULFATE 4 MG: 4 INJECTION INTRAVENOUS at 10:58

## 2019-10-09 RX ADMIN — MORPHINE SULFATE 4 MG: 4 INJECTION INTRAVENOUS at 23:11

## 2019-10-09 RX ADMIN — FLUCONAZOLE 400 MG: 200 TABLET ORAL at 05:47

## 2019-10-09 RX ADMIN — VITAMIN D, TAB 1000IU (100/BT) 1000 UNITS: 25 TAB at 05:49

## 2019-10-09 RX ADMIN — PREGABALIN 25 MG: 25 CAPSULE ORAL at 11:49

## 2019-10-09 RX ADMIN — MORPHINE SULFATE 30 MG: 30 TABLET, EXTENDED RELEASE ORAL at 17:35

## 2019-10-09 RX ADMIN — SENNOSIDES, DOCUSATE SODIUM 2 TABLET: 50; 8.6 TABLET, FILM COATED ORAL at 05:48

## 2019-10-09 RX ADMIN — MIDAZOLAM HYDROCHLORIDE 4 MG: 1 INJECTION, SOLUTION INTRAMUSCULAR; INTRAVENOUS at 13:50

## 2019-10-09 RX ADMIN — OXYCODONE HYDROCHLORIDE 10 MG: 10 TABLET ORAL at 19:07

## 2019-10-09 RX ADMIN — ONDANSETRON 4 MG: 2 INJECTION INTRAMUSCULAR; INTRAVENOUS at 14:37

## 2019-10-09 RX ADMIN — PREGABALIN 25 MG: 25 CAPSULE ORAL at 17:40

## 2019-10-09 RX ADMIN — CALCIUM CITRATE 200 MG (950 MG) TABLET 950 MG: at 05:47

## 2019-10-09 RX ADMIN — OXYCODONE HYDROCHLORIDE 10 MG: 10 TABLET ORAL at 08:20

## 2019-10-09 RX ADMIN — FENTANYL CITRATE 50 MCG: 50 INJECTION, SOLUTION INTRAMUSCULAR; INTRAVENOUS at 13:53

## 2019-10-09 RX ADMIN — PROPOFOL 250 MG: 10 INJECTION, EMULSION INTRAVENOUS at 13:53

## 2019-10-09 ASSESSMENT — ENCOUNTER SYMPTOMS
MEMORY LOSS: 0
DIARRHEA: 0
DIZZINESS: 0
DEPRESSION: 0
BLOOD IN STOOL: 0
ABDOMINAL PAIN: 0
NECK PAIN: 0
BLURRED VISION: 0
WEAKNESS: 0
TREMORS: 0
BACK PAIN: 0
COUGH: 0
MYALGIAS: 1
CLAUDICATION: 0
HEARTBURN: 0
ORTHOPNEA: 0
CONSTIPATION: 0
NAUSEA: 0
NERVOUS/ANXIOUS: 0
STRIDOR: 0
VOMITING: 0
CHILLS: 0
PHOTOPHOBIA: 0
PALPITATIONS: 0
SENSORY CHANGE: 0
DOUBLE VISION: 0
PND: 0
EYE PAIN: 0
SPUTUM PRODUCTION: 0
SHORTNESS OF BREATH: 0
SPEECH CHANGE: 0
TINGLING: 0
SORE THROAT: 0
FEVER: 0
HEADACHES: 0
HEMOPTYSIS: 0

## 2019-10-09 ASSESSMENT — PAIN SCALES - GENERAL: PAIN_LEVEL: 4

## 2019-10-09 NOTE — PROGRESS NOTES
MRI it shows pelvic osteomyelitis to the left pubis.  Had a thorough discussion with him regarding recommended treatment of debridement of osteomyelitis.  Reviewed the risks and benefits of surgery.    Plan:  To the OR today for debridement of pelvic osteomyelitis.  Mobilize and weight-bear as tolerated postoperatively  Follow cultures and pathology

## 2019-10-09 NOTE — CARE PLAN
Problem: Infection  Goal: Will remain free from infection  Outcome: PROGRESSING AS EXPECTED  Note:   Vital signs monitored every 6 hours. Antibiotics and antifungal administered per MAR. PICC line in place - dressing clean, dry, and in tact. Educated pt on s/s to monitor for. Pt believes he is allergic to chlorhexidine - refusing daily CHG bath.      Problem: Pain Management  Goal: Pain level will decrease to patient's comfort goal  Outcome: PROGRESSING AS EXPECTED  Intervention: Follow pain managment plan developed in collaboration with patient and Interdisciplinary Team  Note:   Pt medicated with Oxycodone and Morphine per MAR with improvement in pain level.   Intervention: Educate and implement non-pharmacologic comfort measures. Examples: relaxation, distration, play therapy, activity therapy, massage, etc.  Note:   Heating pad in place

## 2019-10-09 NOTE — ANESTHESIA TIME REPORT
Anesthesia Start and Stop Event Times     Date Time Event    10/9/2019 1339 Ready for Procedure     1350 Anesthesia Start     1456 Anesthesia Stop        Responsible Staff  10/09/19    Name Role Begin End    You Chun M.D. Anesth 1350 1456        Preop Diagnosis (Free Text):  Pre-op Diagnosis     pelvic osteomyelitis to the left pubis        Preop Diagnosis (Codes):    Post op Diagnosis  Osteomyelitis      Premium Reason  Non-Premium    Comments:

## 2019-10-09 NOTE — PROGRESS NOTES
Bedside report received from Ghazal CUEVAS, care of pt assumed. Pt sitting up in cardiac chair, BRAXTON drain in place with 20 cc serous drainage out throughout day shift. PICC in place L upper arm. Slight rash distal to the PICC which pt states he believes is from the chlorhexidine. Pt also has a large rash noted to the trunk, groin, and pannus. Interdry in place. Discussed plan of care, including that he is on the surgery schedule for 1:30 PM tomorrow and plan on NPO at midnight. Pt agrees with the plan and denies further needs at this time.

## 2019-10-09 NOTE — ANESTHESIA POSTPROCEDURE EVALUATION
Patient: Richard Hubbard II    Procedure Summary     Date:  10/09/19 Room / Location:   OR 03 / SURGERY [facility]    Anesthesia Start:  1350 Anesthesia Stop:  1456    Procedure:  IRRIGATION AND DEBRIDEMENT, WOUND - PELVIC OSTEOMYELITIS (Left Pelvis) Diagnosis:  (pelvic osteomyelitis to the left pubis)    Surgeon:  You Olivares M.D. Responsible Provider:  You Chun M.D.    Anesthesia Type:  general ASA Status:  2          Final Anesthesia Type: general  Last vitals  BP   Blood Pressure: (!) 96/64    Temp   36.6 °C (97.9 °F)    Pulse   Pulse: 87, Heart Rate (Monitored): 98   Resp   16    SpO2   92 %      Anesthesia Post Evaluation    Patient location during evaluation: PACU  Patient participation: complete - patient participated  Level of consciousness: awake and alert and sleepy but conscious  Pain score: 4    Airway patency: patent  Anesthetic complications: no  Cardiovascular status: hemodynamically stable  Respiratory status: acceptable  Hydration status: euvolemic    PONV: none           Nurse Pain Score: 6 (NPRS)

## 2019-10-09 NOTE — OR SURGEON
Immediate Post OP Note    PreOp Diagnosis: left pubic fracture, left pubic osteomyelitis    PostOp Diagnosis: same    Procedure(s):  IRRIGATION AND DEBRIDEMENT, WOUND - PELVIC OSTEOMYELITIS - Wound Class: Dirty or Infected    Surgeon(s):  You Olivares M.D.    Anesthesiologist/Type of Anesthesia:  Anesthesiologist: You Chun M.D./General    Surgical Staff:  Circulator: Washington Munoz R.N.  Scrub Person: Néstor Michael    Specimens removed if any:  ID Type Source Tests Collected by Time Destination   1 : left pubis Other Other AEROBIC/ANAEROBIC CULTURE (SURGERY) You Olivares M.D. 10/9/2019  2:27 PM    A : left pubis Other Other PATHOLOGY SPECIMEN You Olivares M.D. 10/9/2019  2:27 PM        Estimated Blood Loss: 25cc    Findings: same as dx     Complications: none      PLAN  WBAT  Mobilize  Follow path and cultures  PT/OT  Abx        10/9/2019 2:52 PM You Olivares M.D.

## 2019-10-09 NOTE — OR NURSING
1243: Transopt brought pt to pre-op holding via bed. Wife, mother and children at bedside. Pt tolerated trip well. Pain is tolerable, no nausea, awake and alert, on room air.  1348: Patient allergies and NPO status verified, home medication reconciliation completed and belongings secured. Patient verbalizes understanding of pain scale, expected course of stay and plan of care. Surgical site verified with patient. IV access established. Sequentials placed on legs.

## 2019-10-09 NOTE — PROGRESS NOTES
Infectious Disease Progress Note    Author: Jessica Goldman M.D. Date & Time of service: 10/9/2019  10:40 AM    Chief Complaint:  Follow-up for left thigh abscess/pelvic OM    Interval History:  44 y.o. man with a history of rheumatoid arthritis on Humira, chronic right knee pain with plans for total right knee arthroplasty, and recent fall sustained 1 month prior to admission admitted for worsening left thigh/groin pain.  Patient found to have multiple fluid collections on imaging which were concerning for an abscess.  He underwent CT-guided drainage on 10/2 with cultures growing MSSA.    10/6 afebrile WBC 12.7 ongoing left groin pain however the crampy pain is somewhat decreased and he is able to ambulate better.  Plan for MRI of the pelvis and femur today  10/7 afebrile WBC 11.4 patient is very anxious after hearing about results of MRI.  MRI of the pelvis reveals osteomyelitis.  Awaiting surgical evaluation for possible surgical debridement.  Patient states left thigh pain remains unchanged and requiring multiple pain medications.  Pain worse with standing  10/8 afebrile WBC 8 patient is agreeable to surgery.  Repeat CT scan shows interval decrease and left thigh abscess.  Minimal output in drain.  Waiting for PICC line  10/9 AF WBC 6.6 plan for surgery today, also has flare up of rash on chest associated with itching    Labs Reviewed.    Review of Systems:  Review of Systems   Constitutional: Negative for chills and fever.   Respiratory: Negative for cough and shortness of breath.    Gastrointestinal: Negative for abdominal pain, diarrhea, nausea and vomiting.   Musculoskeletal: Positive for myalgias.   Skin: Positive for itching and rash.   Neurological: Negative for dizziness and headaches.   All other systems reviewed and are negative.      Hemodynamics:  Temp (24hrs), Av.8 °C (98.3 °F), Min:36.7 °C (98.1 °F), Max:37 °C (98.6 °F)  Temperature: 36.7 °C (98.1 °F)  Pulse  Av  Min: 50  Max: 105    Blood Pressure: (!) 99/72       Physical Exam:  Physical Exam   Constitutional: He is oriented to person, place, and time. He appears well-developed.   Pallor   HENT:   Mouth/Throat: No oropharyngeal exudate.   Eyes: Pupils are equal, round, and reactive to light. Conjunctivae and EOM are normal.   Neck: Neck supple.   Cardiovascular: Normal rate, regular rhythm and normal heart sounds.   Pulmonary/Chest: Effort normal and breath sounds normal.   Abdominal: Soft.   Intertrigo in skin folds   Musculoskeletal: He exhibits tenderness. He exhibits no edema.   Left groin/thigh drain  + Tenderness to palpation in left groin.  No edema    Candidiasis on plantar aspect of both feet    LUE PICC line - nontender, no surrounding erythema   Neurological: He is alert and oriented to person, place, and time. No cranial nerve deficit.   Skin: Skin is warm and dry. Rash noted.   Spotty macular rash on chest associated with overlying dry scales   Psychiatric:   Pleasant  Anxious       Meds:    Current Facility-Administered Medications:   •  oxyCODONE immediate-release  •  fluconazole  •  enoxaparin  •  miconazole  •  ampicillin-sulbactam (UNASYN) IV  •  vitamin D  •  calcium citrate  •  morphine ER  •  ferrous sulfate  •  furosemide  •  losartan  •  omeprazole  •  pregabalin  •  senna-docusate **AND** polyethylene glycol/lytes **AND** magnesium hydroxide **AND** bisacodyl  •  acetaminophen  •  nicotine **AND** Nicotine Replacement Patient Education Materials **AND** nicotine polacrilex  •  morphine injection    Labs:  Recent Labs     10/07/19  0331 10/08/19  0421 10/09/19  0610   WBC 11.4* 8.0 6.6   RBC 3.46* 3.45* 3.34*   HEMOGLOBIN 9.0* 9.0* 8.8*   HEMATOCRIT 30.5* 30.3* 29.3*   MCV 88.2 87.8 87.7   MCH 26.0* 26.1* 26.3*   RDW 64.7* 65.7* 66.0*   PLATELETCT 351 352 344   MPV 8.3* 8.4* 8.4*   NEUTSPOLYS 55.10 54.10 51.70   LYMPHOCYTES 25.30 29.40 30.20   MONOCYTES 9.30 9.40 10.80   EOSINOPHILS 1.10 0.80 1.40   BASOPHILS 1.20 1.10  1.10     Recent Labs     10/07/19  0331 10/08/19  0421 10/09/19  0427   SODIUM 132* 138 135   POTASSIUM 4.1 4.3 4.4   CHLORIDE 97 99 97   CO2 25 28 27   GLUCOSE 77 91 100*   BUN 13 12 11     Recent Labs     10/07/19  0331 10/08/19  0421 10/09/19  0427   CREATININE 0.69 0.68 0.60       Imaging:  Ct-drain-hematoma - Seroma    Result Date: 10/2/2019  10/2/2019 12:14 PM HISTORY/REASON FOR EXAM:  Left inguinal fluid collection. Moderate sedation was administered with continuous monitoring of the patient under the direct supervision of  Total sedation time: 30 minutes The biopsy/drainage was done utilizing low dose optimization CT techniques including auto modulation for  imaging and low mA CT/fluoroscopy mode for the procedure. PROCEDURE: After the informed consent the patient was placed on the CT table on supine position. The skin over the left groin was prepped. Localization CT scan demonstrates fluid collection in the left obturator externus and pectineus muscles. Subsequently after injecting lidocaine needle was advanced into the larger fluid collection. Pus was aspirated. Subsequently a wire was introduced. After dilating the tract an 8 [place] Islander drain was introduced and placed with its loop in the fluid collection. An approximately 100 mL of fluid was aspirated. Subsequently under the needle was advanced into the superficial/pectineus muscle fluid collection. An approximately 10 mL of fluid was aspirated. No drain was placed in the superficial fluid collection. A sample of the material was sent to the microbe allergy. The patient tolerated the procedure without any immediate complication.     1.  Left inguinal region muscular abscess. 2.  CT-guided drainage and catheter placement of large collection in the left obturator externus. 3.  CT-guided aspiration of smaller collection in the left pectineus muscle.    Ct-lspine W/o Plus Recons    Result Date: 9/28/2019 9/28/2019 4:58 AM HISTORY/REASON FOR  EXAM: Back pain after recent fall TECHNIQUE/EXAM DESCRIPTION: CT lumbar spine without contrast, with reconstructions. Noncontrast helical scanning was obtained from T12 through the sacrum.  Sagittal reconstructions were generated from the axial images. Low dose optimization technique was utilized for this CT exam including automated exposure control and adjustment of the mA and/or kV according to patient size. COMPARISON:  None. FINDINGS: The bones are markedly osteopenic. There is a minimal compression deformity of the superior endplate of the L2 body, which is old. There is no evidence of acute fracture. There is multilevel interspace and facet arthropathy.     No acute fracture or traumatic malalignment.    Ct-pelvis With    Result Date: 9/28/2019 9/28/2019 3:21 AM HISTORY/REASON FOR EXAM:  significant left groin pain, possible hematoma on us. TECHNIQUE/EXAM DESCRIPTION AND NUMBER OF VIEWS: CT scan of the pelvis with contrast. Contrast-enhanced helical scanning of the pelvis was obtained from the iliac crests through the pubic symphysis following the bolus administration of 100 mL of Omnipaque 350 nonionic contrast without complication. Low dose optimization technique was utilized for this CT exam including automated exposure control and adjustment of the mA and/or kV according to patient size. COMPARISON:  None. FINDINGS: In the deep left inguinal region, there is a masslike structure containing irregular fluid-filled cavity, which measures overall about 8.6 cm in longest dimension. Within the cavity or focal areas of increased attenuation consistent with calcification. This lesion is immediately adjacent to a recent no probably subacute fracture of the left superior pubic ramus near the symphysis. Lateral to this lesion is a smaller complex mass measuring about 7.3 cm maximum dimension. There is no abnormality within the visualized pelvic cavity and there is no free intraperitoneal fluid. The bladder is  intact.     1.  Recent but probably subacute fracture of the left superior pubic ramus near the symphysis. Adjacent and just lateral to this are two complex masslike structures, the largest and most medial of which contains a fluid-filled cavity. These are consistent with hematomata, possibly intramuscular.    Dx-lumbar Spine-2 Or 3 Views    Result Date: 9/16/2019 9/16/2019 4:15 PM HISTORY/REASON FOR EXAM:  Atraumatic Pain TECHNIQUE/ EXAM DESCRIPTION AND NUMBER OF VIEWS:  3 views of the lumbar spine. COMPARISON: None. FINDINGS: Dextroconvex lumbar curvature. Evaluation of the sacrum is limited due to overlying bowel content. No acute fracture. No malalignment. No compression deformity. Mild to moderate degenerative change of the lumbar spine.     Dextroconvex lumbar curvature. No acute osseous abnormality. No malalignment.    Dx-thoracic Spine-with Swimmers View    Result Date: 9/16/2019 9/16/2019 4:15 PM HISTORY/REASON FOR EXAM:  Atraumatic Pain; ?scoliotic MId back pain and low back pain after GLF x 2 weeks TECHNIQUE/EXAM DESCRIPTION AND NUMBER OF VIEWS:  Thoracic spine, 3 views. COMPARISON:  None. FINDINGS: Diffuse osseous demineralization, limiting evaluation for nondisplaced fracture. No acute fracture. No malalignment. Mild chronic height loss of the mid thoracic spine from T9 to T11. Mild degenerative change of the thoracic spine.     Mild chronic height loss of the mid thoracic spine from T9 to T11.    Mr-lumbar Spine-w/o    Result Date: 9/28/2019 9/28/2019 6:21 PM HISTORY/REASON FOR EXAM:  back pain. TECHNIQUE/EXAM DESCRIPTION: MRI of the lumbar spine without contrast. The study was performed on a [employer] 1.5 Mae MRI scanner. T1 sagittal, T2 fast spin-echo sagittal, and T2 axial images were obtained of the lumbar spine. COMPARISON:  None. FINDINGS: There are bilateral sacral alar fractures. The lumbar spine maintains normal height and alignment. At the level of L5-S1, there is no spinal or neural  foraminal stenosis. At the level of L4-5, there is no spinal or neural foraminal stenosis. At the level of L3-4, there is no spinal or neural foraminal stenosis. At the level of the L2-3, there is no spinal or neural foraminal stenosis. At the level of L1-2, there is no spinal or neural foraminal stenosis. The conus terminates at the level of L1. The visualized lower thoracic spinal cord appear normal. There is edema in the posterior paraspinal soft tissue likely representing soft tissue contusion.     1.  Acute bilateral sacral alae fractures. 2.  There is edema in the lower posterior paraspinal soft tissue likely representing soft tissue contusion. 3.  There is no lumbar spine fractures.     Mr-thoracic Spine-w/o    Result Date: 9/28/2019 9/28/2019 6:21 PM HISTORY/REASON FOR EXAM:  back pain TECHNIQUE/EXAM DESCRIPTION: MRI of the thoracic spine without contrast. The study was performed on a [employer] 1.5 Mae MRI scanner. T1 sagittal, T2 fast spin-echo sagittal, and T2 axial images were obtained of the thoracic spine. COMPARISON: None. FINDINGS: There is mild chronic compression deformity at T11. There is no fracture or dislocation. There is no pathologic marrow infiltration. There is no intradural extramedullary lesion. There is no abnormal intramedullary T2 signal intensity in the thoracic spinal cord.     1.  No acute fracture in the thoracic spine. 2.  Mild chronic compression deformity at T11.    Us-extremity Venous Lower Unilat Left    Result Date: 9/28/2019  HISTORY/REASON FOR EXAM:  Leg swelling after fall TECHNIQUE/EXAM DESCRIPTION: Left lower extremity doppler venous ultrasound was performed. Using both color and pulse-wave Doppler imaging, multiple images were obtained from the common femoral vein origins distally through the popliteal trifurcations.  9/28/2019 12:54 AM COMPARISON:  None. FINDINGS: REAL-TIME GRAY-SCALE IMAGING: Real-time gray-scale imaging reveals no evidence of focal wall  thickening. COLOR AND DUPLEX DOPPLER IMAGING: Graded compression was used to demonstrate patent lumens.  There is no evidence for luminal thrombus.  There is normal response to augmentation and respiratory variation. There are two rounded complex areas in the left groin region, the largest measuring about 10.9 cm diameter and the smaller, about 8.3 cm in diameter, both of which are consistent with hematomata.     1.  No evidence of Left  lower extremity deep venous thrombosis. 2.  Two complex areas within the left groin, consistent with hematomata. INTERPRETING LOCATION: [address] [facility], [place], [address]    Us-ruq    Result Date: 10/1/2019  10/1/2019 5:49 PM HISTORY/REASON FOR EXAM:  Edema Abdominal pain TECHNIQUE/EXAM DESCRIPTION AND NUMBER OF VIEWS:  Real-time sonography of the liver and biliary tree. COMPARISON: None FINDINGS: The liver is normal in contour. There is no evidence of solid mass lesion. The liver measures 20.15 cm. The gallbladder is surgically absent. The common duct measures 0.44 cm. The pancreas is obscured by bowel gas. The visualized aorta is normal in caliber. Intrahepatic IVC is patent. The portal vein is patent with hepatopetal flow. The MPV measures 1.57 cm. The right kidney measures 11.06 cm. There is no hydronephrosis. There is no ascites.     Hepatomegaly. Status post cholecystectomy. Dilated portal vein. Status post cholecystectomy. No biliary ductal dilatation. Limited evaluation of the pancreas which is obscured.      Micro:  Results     Procedure Component Value Units Date/Time    Blood Culture [[id number]] Collected:  10/02/19 1540    Order Status:  Completed Specimen:  Blood from Peripheral Updated:  10/07/19 1817     Significant Indicator NEG     Source BLD     Site Peripheral     Culture Result No growth after 5 days of incubation.  Blood culture testing and Gram stain, if indicated, are  performed at [facility], [address]  [place], [place].   "Positive blood cultures are  sent to [facility], [address] [place] [address], for organism identification and  susceptibility testing.      Narrative:       From different peripheral sites, if not done within the last  24 hours (Per Hospital Policy: Only change specimen source to  \"Line\" if specified by physician order)  Right AC    Blood Culture [[id number]] Collected:  10/02/19 1541    Order Status:  Completed Specimen:  Blood from Peripheral Updated:  10/07/19 1817     Significant Indicator NEG     Source BLD     Site Peripheral     Culture Result No growth after 5 days of incubation.  Blood culture testing and Gram stain, if indicated, are  performed at [facility], [address] [place] [address].  Positive blood cultures are  sent to [facility], [address] [place] [address], for organism identification and  susceptibility testing.      Narrative:       From different peripheral sites, if not done within the last  24 hours (Per Hospital Policy: Only change specimen source to  \"Line\" if specified by physician order)  Left Hand    Anaerobic Culture [[id number]] Collected:  10/02/19 1330    Order Status:  Completed Specimen:  Wound Updated:  10/05/19 1019     Significant Indicator NEG     Source WND     Site Left Thigh Abscess     Culture Result No Anaerobes isolated.    Narrative:       Radiology specimen    CULTURE WOUND W/ GRAM STAIN [[id number]]  (Abnormal)  (Susceptibility) Collected:  10/02/19 1330    Order Status:  Completed Specimen:  Wound Updated:  10/05/19 1019     Significant Indicator POS     Source WND     Site Left Thigh Abscess     Culture Result -     Gram Stain Result Many WBCs.  Moderate Gram positive cocci.  Rare Gram negative rods.       Culture Result Staphylococcus aureus  Moderate growth      Narrative:       Radiology specimen    Susceptibility     Staphylococcus aureus (1)     Antibiotic Interpretation Microscan Method " Status    Clindamycin Sensitive <=0.5 mcg/mL MARÍA Final    Daptomycin Sensitive <=0.5 mcg/mL MARÍA Final    Erythromycin Sensitive <=0.5 mcg/mL MARÍA Final    Ampicillin/sulbactam Sensitive <=8/4 mcg/mL MARÍA Final    Moxifloxacin Intermediate 4 mcg/mL MARÍA Final    Vancomycin Sensitive 1 mcg/mL MARÍA Final    Oxacillin Sensitive <=0.25 mcg/mL MARÍA Final    Trimeth/Sulfa Sensitive <=0.5/9.5 mcg/mL MARÍA Final    Tetracycline Sensitive <=4 mcg/mL MARÍA Final                   GRAM STAIN [[id number]] Collected:  10/02/19 1330    Order Status:  Completed Specimen:  Wound Updated:  10/03/19 0107     Significant Indicator .     Source WND     Site Left Thigh Abscess     Gram Stain Result Many WBCs.  Moderate Gram positive cocci.  Rare Gram negative rods.      Narrative:       Radiology specimen    FLUID CULTURE W/GRAM STAIN [[id number]] Collected:  10/02/19 1330    Order Status:  Canceled Specimen:  Other           Assessment:  Active Hospital Problems    Diagnosis   • *Pubic ramus fracture ([facility]) [S32.599A]   • Essential hypertension [I10]   • Rheumatoid arthritis ([facility]) [M06.9]   • Chronic pain of right knee [M25.561, G89.29]   • Candida rash of groin [B37.89]   • Abscess of left thigh [L02.416]   • Sepsis ([facility]) [A41.9]   • Hematoma of left thigh [S70.12XA]       Plan:  Osteomyelitis of the left pubic bone(noted on MRI)  Status post recent fall found to have subacute fracture on presentation  Appreciate Ortho evaluation  ESR - 55  Anticipate a PICC line and 6 weeks of IV antibiotics  Continue antibiotics per below  Pt agreeable to surgery - planned for today  Please obtain bone biopsy and send for pathology    Abscess of left thigh, improved  No fevers  Persistent leukocytosis, now resolved  Sustained recent fall 1 month prior to admission  Blood cultures-negative to date  Status post CT-guided drainage on 10/2  Cultures+ MSSA.  Gram stain+ GNR, GPC  Continue IV Unasyn  Repeat CT on 10/7-interval decrease in the left pelvis and thigh  intramuscular abscess    Chest rash, new  Does not appear to be consistent with drug rash 2/2 abx as pt had flare prior to admission  May be 2/2 below   Monitor    Candidiasis.  On treatment  Continue p.o. fluconazole 400 mg daily  Plan 7-day course total.  Stop date 10/11/2019    Rheumatoid arthritis  On Humira prior to admission  Chronically immunosuppressed    Chronic right knee pain  Plan for future total right knee arthroplasty    Recent fall  Approximately 1 month prior to admission  Imaging on presentation revealed subacute fracture of the left pubic bone    I have performed a physical exam and reviewed and updated ROS and plan today 10/9/2019.  In review of yesterday's note 10/8/2019, there are no changes except as documented above.    Disposition: TBD    Plan of care discussed with internal medicine/Dr. Pena

## 2019-10-09 NOTE — PROGRESS NOTES
[facility] Medicine Daily Progress Note    Date of Service  10/9/2019    Chief Complaint  44 y.o. male admitted 9/27/2019 with left thigh pain    Hospital Course    44 y.o. male who presented to the emergency department on 9/27/2019 for evaluation of worsening left leg pain.  Patient had a fall about a month ago.  Patient has a problem with his right knee, on schedule for total knee replacement and ever since walking with a cane or walker. He had increasing pain in the left thigh and CT scan showed a left pubic ramus fracture with two large hematomas. Orthopedic surgery did see the patient and recommends no surgical intervention.  The patient had a rising white cell count and increasing pain so hematoma drainage was done by interventional radiology and tiffanie pus was drained consistent with an abscess. Cultures grew MSSA and drain was left in place.      Interval Problem Update  No acute issues overnight, patient status he still has 5 out of 10 pain in his left thigh area, however pain is well controlled with his current regimen.  Patient is followed by ID, continue IV antibiotics  Waiting for surgical intervention by orthopedics    10/9  Patient says he feels the same, pain is better controlled but still has bouts of pain 5-7/10 left thigh/groin area.   Awaiting for surgery today. Denies itchiness around skin. Just also complaining of rheumatoid arthritis joint discomfort in his right wrist    Consultants/Specialty  Orthopedic surgery Dr. Olivares  Radiology Dr. Olguin    Code Status  Full Code    Disposition  TBD    Review of Systems  Review of Systems   Constitutional: Positive for malaise/fatigue. Negative for chills and fever.   HENT: Negative for congestion, hearing loss, sore throat and tinnitus.    Eyes: Negative for blurred vision, double vision, photophobia and pain.   Respiratory: Negative for cough, hemoptysis, sputum production, shortness of breath and stridor.    Cardiovascular: Negative for chest  pain, palpitations, orthopnea, claudication, leg swelling and PND.   Gastrointestinal: Negative for abdominal pain, blood in stool, constipation, heartburn, melena, nausea and vomiting.   Genitourinary: Negative for dysuria, frequency and urgency.   Musculoskeletal: Positive for joint pain (hands, leg) and myalgias. Negative for back pain and neck pain.        Left thigh pain   Skin: Positive for rash (improving). Negative for itching.        Mild skin peeling over his left thigh area   Neurological: Negative for dizziness, tingling, tremors, sensory change, speech change, weakness and headaches.   Psychiatric/Behavioral: Negative for depression, memory loss and suicidal ideas. The patient is not nervous/anxious.    All other systems reviewed and are negative.       Physical Exam  Temp:  [36.7 °C (98.1 °F)-37 °C (98.6 °F)] 36.7 °C (98.1 °F)  Pulse:  [79-99] 79  Resp:  [18] 18  BP: ()/(67-81) 99/72  SpO2:  [96 %-100 %] 96 %    Physical Exam   Constitutional: He is oriented to person, place, and time. He appears well-developed and well-nourished. No distress.   HENT:   Head: Normocephalic and atraumatic.   Mouth/Throat: No oropharyngeal exudate.   Eyes: Pupils are equal, round, and reactive to light. Conjunctivae and EOM are normal. Right eye exhibits no discharge. No scleral icterus.   Neck: Neck supple. No JVD present. No tracheal deviation present. No thyromegaly present.   Cardiovascular: Normal rate, regular rhythm and intact distal pulses.   No murmur heard.  Pulses:       Dorsalis pedis pulses are 2+ on the right side, and 2+ on the left side.   Cap refill < 3 s   Pulmonary/Chest: Effort normal and breath sounds normal. No stridor. No respiratory distress. He has no wheezes. He has no rales.   Abdominal: Soft. Bowel sounds are normal. He exhibits no distension. There is no tenderness. There is no rebound.   Musculoskeletal: Normal range of motion. He exhibits tenderness (Left diarrhea). He exhibits no  edema.   Left thigh tenderness, with noted drain in left thigh, serosanguineous discharge,    Neurological: He is alert and oriented to person, place, and time. Coordination normal.   Difficulty bearing weight on left leg due to pain   Skin: Skin is warm and dry. Rash (improving.) noted. He is not diaphoretic. No erythema. No pallor.   Macular rash over trunk. improving     Psychiatric: He has a normal mood and affect. His behavior is normal. Thought content normal.   Nursing note and vitals reviewed.      Fluids    Intake/Output Summary (Last 24 hours) at 10/9/2019 0706  Last data filed at 10/9/2019 0400  Gross per 24 hour   Intake 1960 ml   Output 4805 ml   Net -2845 ml       Laboratory  Recent Labs     10/07/19  0331 10/08/19  0421 10/09/19  0610   WBC 11.4* 8.0 6.6   RBC 3.46* 3.45* 3.34*   HEMOGLOBIN 9.0* 9.0* 8.8*   HEMATOCRIT 30.5* 30.3* 29.3*   MCV 88.2 87.8 87.7   MCH 26.0* 26.1* 26.3*   MCHC 29.5* 29.7* 30.0*   RDW 64.7* 65.7* 66.0*   PLATELETCT 351 352 344   MPV 8.3* 8.4* 8.4*     Recent Labs     10/07/19  0331 10/08/19  0421 10/09/19  0427   SODIUM 132* 138 135   POTASSIUM 4.1 4.3 4.4   CHLORIDE 97 99 97   CO2 25 28 27   GLUCOSE 77 91 100*   BUN 13 12 11   CREATININE 0.69 0.68 0.60   CALCIUM 8.2* 8.8 8.5                   Imaging  IR-PICC LINE PLACEMENT W/ GUIDANCE > AGE 5   Final Result                  Ultrasound-guided PICC placement performed by qualified nursing staff as    above.          CT-ABDOMEN-PELVIS WITH   Final Result      1.  Interval decrease in the intramuscular abscesses involving the left pelvis and thigh following drain placement. Collection in the pubococcygeus muscle is similar in size to the prior exam.      2.  Ventral hernia containing anterior bowel wall and fat. No secondary obstruction.      US-EXTREMITY NON VASCULAR UNILATERAL RIGHT   Final Result      Loculated collection in the posteromedial left knee likely represents a Baker's cyst. No significant knee effusion is  identified in the upper knee joint on recent MRI.      MR-FEMUR-W/O LEFT   Final Result      1.  Limited exam secondary to significant patient motion.   2.  Subacute fracture involving the left pubic bone with osteomyelitis and probable intraosseous abscess.   3.  Extensive edema and enlargement of the muscles of the medial compartment, pectineus muscle, and pubococcygeus muscle is consistent with known intramuscular abscesses. Fluid collection is most extensive in the adductor longus muscle.   4.  Subacute fractures of the sacrum               Comment: Results given to Dr. Varma through [place] text at 3:56 PM      MR-PELVIS W/O   Final Result      1.  Limited exam secondary to significant patient motion.   2.  Subacute fracture involving the left pubic bone with osteomyelitis and probable intraosseous abscess.   3.  Extensive edema and enlargement of the muscles of the medial compartment, pectineus muscle, and pubococcygeus muscle is consistent with known intramuscular abscesses. Fluid collection is most extensive in the adductor longus muscle.   4.  Subacute fractures of the sacrum               Comment: Results given to Dr. Varma through [place] text at 3:56 PM      CT-DRAIN-HEMATOMA - SEROMA   Final Result      1.  Left inguinal region muscular abscess.   2.  CT-guided drainage and catheter placement of large collection in the left obturator externus.   3.  CT-guided aspiration of smaller collection in the left pectineus muscle.      US-RUQ   Final Result      Hepatomegaly.      Status post cholecystectomy.      Dilated portal vein.      Status post cholecystectomy.      No biliary ductal dilatation.      Limited evaluation of the pancreas which is obscured.      MR-THORACIC SPINE-W/O   Final Result      1.  No acute fracture in the thoracic spine.   2.  Mild chronic compression deformity at T11.      MR-LUMBAR SPINE-W/O   Final Result      1.  Acute bilateral sacral alae fractures.   2.  There is edema in the  lower posterior paraspinal soft tissue likely representing soft tissue contusion.   3.  There is no lumbar spine fractures.         CT-LSPINE W/O PLUS RECONS   Final Result      No acute fracture or traumatic malalignment.      CT-PELVIS WITH   Final Result      1.  Recent but probably subacute fracture of the left superior pubic ramus near the symphysis. Adjacent and just lateral to this are two complex masslike structures, the largest and most medial of which contains a fluid-filled cavity. These are    consistent with hematomata, possibly intramuscular.      US-EXTREMITY VENOUS LOWER UNILAT LEFT   Final Result      1.  No evidence of Left  lower extremity deep venous thrombosis.   2.  Two complex areas within the left groin, consistent with hematomata.            INTERPRETING LOCATION: [address] [facility], [place], [address]           Assessment/Plan  * Pubic ramus fracture (HCC)  Assessment & Plan  S/p mechanical GLF  MS Continue 15mg BID continue with oxycodone for breakthrough  PRN IV morphine   PT/OT when able after surgery    Osteomyelitis ([facility])  Assessment & Plan  MRI femus shows Subacute fracture involving the left pubic bone with osteomyelitis and probable intraosseous abscess. Extensive edema and enlargement of the muscles of the medial compartment, pectineus muscle, and pubococcygeus muscle is consistent with known intramuscular abscesses. Fluid collection is most extensive in the adductor longus muscle.  ID following  Resume IV unasyn  Pending surgery today around 1:30pm.      Candida rash of groin  Assessment & Plan  Improved overall.   Resume Diflucan    Sepsis (HCC)  Assessment & Plan  This is Sepsis Not present on admission  SIRS criteria identified on my evaluation include: Tachycardia, with heart rate greater than 90 BPM and Leukocyosis, with WBC greater than 12,000  Source is left thigh abscess  Leukocytosis resolved  CTM    Abscess of left thigh  Assessment & Plan  Multiple abscesses found on imaging  CT/MRI  Cultures +MSSA  Resume IV unasyn  ID following and surgery    Hematoma of left thigh  Assessment & Plan  2/2 to GLF  Now has infected hematoma  Resume IV abx as above      Bilateral leg edema  Assessment & Plan  Stable,   Check serum albumin    Iron deficiency anemia, unspecified iron deficiency anemia type- (present on admission)  Assessment & Plan  Stable, last admit he received IV iron.  Hgb 8.8<9.0  Resume nutritional support       Essential hypertension- (present on admission)  Assessment & Plan  Well Controlled   Resume  Coreg, Losartan and lasix.     Chronic pain of right knee- (present on admission)  Assessment & Plan  Chronically on MS contin plus short acting oxycodone for breakthrough pain  Resume IV prn ,     Rheumatoid arthritis (HCC)- (present on admission)  Assessment & Plan  Continue Humira as outpatient       The total time spent face to face with this patient was about 33 mins of which 60% of time was spent on counseling, review of records including pertinent lab data and studies, as well as discussing diagnostic evaluation and work up, planned therapeutic interventions and future disposition of care. Where indicated, the assessment and plan reflect discussion of patient with consultants, other healthcare providers, family members, and additional research needed to obtain further information in formulating the plan of care of this patient.       VTE prophylaxis: lovenox

## 2019-10-09 NOTE — ANESTHESIA PROCEDURE NOTES
Airway  Date/Time: 10/9/2019 1:53 PM  Performed by: You Chun M.D.  Authorized by: You Chun M.D.     Location:  OR  Urgency:  Elective  Difficult Airway: No    Indications for Airway Management:  Anesthesia  Spontaneous Ventilation: absent    Sedation Level:  Deep  Preoxygenated: Yes    Patient Position:  Sniffing  Mask Difficulty Assessment:  1 - vent by mask  Final Airway Type:  Supraglottic airway  Final Supraglottic Airway:  Standard LMA  SGA Size:  4  Number of Attempts at Approach:  1

## 2019-10-09 NOTE — OR NURSING
1450- To PACU from OR, Report Received, Pt sleeping, respirations spontaneous and non-labored via LMA.  Monitors connected VSS.  Abdomen soft, incision with opsite and gauze dressing CD/I.  Previous BRAXTON drain inplace with no drainage.     1500- C/O severe burning and cramping pain, will medicate.    1515- Call to Dr. Cuevas for update, con't to medicate, Vanco started.    1530- Ice packi given, con't to medicate. VSS Taking sips of water.  Skin prep washed away with warm cloth.    1545- Pt appears more calm and resting.  Occasional spasms jolt him awake.  PICC line single lumen in left  infusing well.    1600- Apple juice given, pt resting, VSS.    1620- Meets D/C criteria.    1635- Pt having spasms in pelvic area, call placed to Dr. Olivares.    1645- Valium given as ordered.  Pt resting.    1655- Report to Anthony CUEVAS

## 2019-10-09 NOTE — ANESTHESIA PREPROCEDURE EVALUATION
Relevant Problems   ANESTHESIA   (+) MONICA (obstructive sleep apnea)      CARDIAC   (+) Aortic ejection murmur   (+) Aortic root dilatation (HCC)   (+) Aortic stenosis, moderate   (+) Essential hypertension      GI   (+) Bleeding ulcer      Other   (+) Osteomyelitis (HCC)   (+) Rheumatoid arthritis (HCC)       Physical Exam    Airway   Mallampati: II  TM distance: >3 FB  Neck ROM: full       Cardiovascular - normal exam  Rhythm: regular  Rate: normal  (-) murmur     Dental - normal exam         Pulmonary - normal exam  Breath sounds clear to auscultation     Abdominal    Neurological - normal exam                 Anesthesia Plan    ASA 2       Plan - general       Airway plan will be LMA        Induction: intravenous    Postoperative Plan: Postoperative administration of opioids is intended.    Pertinent diagnostic labs and testing reviewed    Informed Consent:    Anesthetic plan and risks discussed with patient.    Use of blood products discussed with: patient whom consented to blood products.

## 2019-10-09 NOTE — ANESTHESIA QCDR
2019 [facility] Clinical Data Registry (for Quality Improvement)     Postoperative nausea/vomiting risk protocol (Adult = 18 yrs and Pediatric 3-17 yrs)- (430 and 463)  General inhalation anesthetic (NOT TIVA) with PONV risk factors: Yes  Provision of anti-emetic therapy with at least 2 different classes of agents: Yes   Patient DID NOT receive anti-emetic therapy and reason is documented in Medical Record:  N/A    Multimodal Pain Management- (AQI59)  Patient undergoing Elective Surgery (i.e. Outpatient, or ASC, or Prescheduled Surgery prior to Hospital Admission): Yes  Use of Multimodal Pain Management, two or more drugs and/or interventions, NOT including systemic opioids: Yes   Exception: Documented allergy to multiple classes of analgesics:  N/A    PACU assessment of acute postoperative pain prior to Anesthesia Care End- Applies to Patients Age = 18- (ABG7)  Initial PACU pain score is which of the following: < 7/10  Patient unable to report pain score: N/A    Post-anesthetic transfer of care checklist/protocol to PACU/ICU- (426 and 427)  Upon conclusion of case, patient transferred to which of the following locations: PACU/Non-ICU  Use of transfer checklist/protocol: Yes  Exclusion: Service Performed in Patient Hospital Room (and thus did not require transfer): N/A    PACU Reintubation- (AQI31)  General anesthesia requiring endotracheal intubation (ETT) along with subsequent extubation in OR or PACU: No  Required reintubation in the PACU: N/A  Extubation was a planned trial documented in the medical record prior to removal of the original airway device: N/A    Unplanned admission to ICU related to anesthesia service up through end of PACU care- (MD51)  Unplanned admission to ICU (not initially anticipated at anesthesia start time): No

## 2019-10-10 LAB
ALBUMIN SERPL BCP-MCNC: 2.5 G/DL (ref 3.2–4.9)
ALBUMIN/GLOB SERPL: 0.7 G/DL
ALP SERPL-CCNC: 233 U/L (ref 30–99)
ALT SERPL-CCNC: 69 U/L (ref 2–50)
ANION GAP SERPL CALC-SCNC: 11 MMOL/L (ref 0–11.9)
AST SERPL-CCNC: 70 U/L (ref 12–45)
BASOPHILS # BLD AUTO: 0.8 % (ref 0–1.8)
BASOPHILS # BLD: 0.05 K/UL (ref 0–0.12)
BILIRUB SERPL-MCNC: 0.2 MG/DL (ref 0.1–1.5)
BUN SERPL-MCNC: 10 MG/DL (ref 8–22)
CALCIUM SERPL-MCNC: 8.3 MG/DL (ref 8.4–10.2)
CHLORIDE SERPL-SCNC: 101 MMOL/L (ref 96–112)
CO2 SERPL-SCNC: 27 MMOL/L (ref 20–33)
CREAT SERPL-MCNC: 0.67 MG/DL (ref 0.5–1.4)
EOSINOPHIL # BLD AUTO: 0.09 K/UL (ref 0–0.51)
EOSINOPHIL NFR BLD: 1.4 % (ref 0–6.9)
ERYTHROCYTE [DISTWIDTH] IN BLOOD BY AUTOMATED COUNT: 66.9 FL (ref 35.9–50)
GLOBULIN SER CALC-MCNC: 3.7 G/DL (ref 1.9–3.5)
GLUCOSE SERPL-MCNC: 109 MG/DL (ref 65–99)
HCT VFR BLD AUTO: 28.6 % (ref 42–52)
HGB BLD-MCNC: 8.6 G/DL (ref 14–18)
IMM GRANULOCYTES # BLD AUTO: 0.15 K/UL (ref 0–0.11)
IMM GRANULOCYTES NFR BLD AUTO: 2.4 % (ref 0–0.9)
LYMPHOCYTES # BLD AUTO: 1.35 K/UL (ref 1–4.8)
LYMPHOCYTES NFR BLD: 21.2 % (ref 22–41)
MCH RBC QN AUTO: 26.5 PG (ref 27–33)
MCHC RBC AUTO-ENTMCNC: 30.1 G/DL (ref 33.7–35.3)
MCV RBC AUTO: 88 FL (ref 81.4–97.8)
MONOCYTES # BLD AUTO: 0.61 K/UL (ref 0–0.85)
MONOCYTES NFR BLD AUTO: 9.6 % (ref 0–13.4)
NEUTROPHILS # BLD AUTO: 4.12 K/UL (ref 1.82–7.42)
NEUTROPHILS NFR BLD: 64.6 % (ref 44–72)
NRBC # BLD AUTO: 0 K/UL
NRBC BLD-RTO: 0 /100 WBC
PATHOLOGY CONSULT NOTE: NORMAL
PLATELET # BLD AUTO: 336 K/UL (ref 164–446)
PMV BLD AUTO: 8.5 FL (ref 9–12.9)
POTASSIUM SERPL-SCNC: 4.3 MMOL/L (ref 3.6–5.5)
PROT SERPL-MCNC: 6.2 G/DL (ref 6–8.2)
RBC # BLD AUTO: 3.25 M/UL (ref 4.7–6.1)
SODIUM SERPL-SCNC: 139 MMOL/L (ref 135–145)
WBC # BLD AUTO: 6.4 K/UL (ref 4.8–10.8)

## 2019-10-10 PROCEDURE — A9270 NON-COVERED ITEM OR SERVICE: HCPCS | Performed by: HOSPITALIST

## 2019-10-10 PROCEDURE — 97530 THERAPEUTIC ACTIVITIES: CPT

## 2019-10-10 PROCEDURE — 85025 COMPLETE CBC W/AUTO DIFF WBC: CPT

## 2019-10-10 PROCEDURE — 700102 HCHG RX REV CODE 250 W/ 637 OVERRIDE(OP): Performed by: HOSPITALIST

## 2019-10-10 PROCEDURE — 97535 SELF CARE MNGMENT TRAINING: CPT

## 2019-10-10 PROCEDURE — 97116 GAIT TRAINING THERAPY: CPT

## 2019-10-10 PROCEDURE — 700102 HCHG RX REV CODE 250 W/ 637 OVERRIDE(OP): Performed by: INTERNAL MEDICINE

## 2019-10-10 PROCEDURE — 700105 HCHG RX REV CODE 258: Performed by: INTERNAL MEDICINE

## 2019-10-10 PROCEDURE — 770001 HCHG ROOM/CARE - MED/SURG/GYN PRIV*

## 2019-10-10 PROCEDURE — 99233 SBSQ HOSP IP/OBS HIGH 50: CPT | Performed by: HOSPITALIST

## 2019-10-10 PROCEDURE — 700111 HCHG RX REV CODE 636 W/ 250 OVERRIDE (IP): Performed by: HOSPITALIST

## 2019-10-10 PROCEDURE — A9270 NON-COVERED ITEM OR SERVICE: HCPCS | Performed by: INTERNAL MEDICINE

## 2019-10-10 PROCEDURE — 99233 SBSQ HOSP IP/OBS HIGH 50: CPT | Performed by: INTERNAL MEDICINE

## 2019-10-10 PROCEDURE — 80053 COMPREHEN METABOLIC PANEL: CPT

## 2019-10-10 PROCEDURE — 700111 HCHG RX REV CODE 636 W/ 250 OVERRIDE (IP): Performed by: INTERNAL MEDICINE

## 2019-10-10 RX ORDER — MORPHINE SULFATE 15 MG/1
15 TABLET, FILM COATED, EXTENDED RELEASE ORAL DAILY
Status: DISCONTINUED | OUTPATIENT
Start: 2019-10-10 | End: 2019-10-13

## 2019-10-10 RX ADMIN — FUROSEMIDE 20 MG: 40 TABLET ORAL at 15:27

## 2019-10-10 RX ADMIN — ACETAMINOPHEN 650 MG: 325 TABLET, FILM COATED ORAL at 17:43

## 2019-10-10 RX ADMIN — MORPHINE SULFATE 4 MG: 4 INJECTION INTRAVENOUS at 17:36

## 2019-10-10 RX ADMIN — MICONAZOLE NITRATE: 20 CREAM TOPICAL at 06:18

## 2019-10-10 RX ADMIN — FLUCONAZOLE 400 MG: 200 TABLET ORAL at 06:14

## 2019-10-10 RX ADMIN — PREGABALIN 25 MG: 25 CAPSULE ORAL at 17:36

## 2019-10-10 RX ADMIN — SENNOSIDES, DOCUSATE SODIUM 2 TABLET: 50; 8.6 TABLET, FILM COATED ORAL at 17:36

## 2019-10-10 RX ADMIN — OXYCODONE HYDROCHLORIDE 10 MG: 10 TABLET ORAL at 22:42

## 2019-10-10 RX ADMIN — OXYCODONE HYDROCHLORIDE 10 MG: 10 TABLET ORAL at 00:47

## 2019-10-10 RX ADMIN — OXYCODONE HYDROCHLORIDE 10 MG: 10 TABLET ORAL at 10:34

## 2019-10-10 RX ADMIN — VITAMIN D, TAB 1000IU (100/BT) 1000 UNITS: 25 TAB at 06:30

## 2019-10-10 RX ADMIN — MORPHINE SULFATE 15 MG: 15 TABLET, EXTENDED RELEASE ORAL at 12:47

## 2019-10-10 RX ADMIN — ENOXAPARIN SODIUM 40 MG: 100 INJECTION SUBCUTANEOUS at 06:15

## 2019-10-10 RX ADMIN — OMEPRAZOLE 40 MG: 20 CAPSULE, DELAYED RELEASE ORAL at 06:12

## 2019-10-10 RX ADMIN — MORPHINE SULFATE 4 MG: 4 INJECTION INTRAVENOUS at 04:05

## 2019-10-10 RX ADMIN — PREGABALIN 25 MG: 25 CAPSULE ORAL at 06:12

## 2019-10-10 RX ADMIN — MICONAZOLE NITRATE: 20 CREAM TOPICAL at 17:44

## 2019-10-10 RX ADMIN — OXYCODONE HYDROCHLORIDE 10 MG: 10 TABLET ORAL at 06:23

## 2019-10-10 RX ADMIN — AMPICILLIN AND SULBACTAM 3 G: 1; 2 INJECTION, POWDER, FOR SOLUTION INTRAMUSCULAR; INTRAVENOUS at 17:37

## 2019-10-10 RX ADMIN — MORPHINE SULFATE 4 MG: 4 INJECTION INTRAVENOUS at 13:19

## 2019-10-10 RX ADMIN — FERROUS SULFATE TAB 325 MG (65 MG ELEMENTAL FE) 325 MG: 325 (65 FE) TAB at 06:13

## 2019-10-10 RX ADMIN — MORPHINE SULFATE 30 MG: 30 TABLET, EXTENDED RELEASE ORAL at 06:12

## 2019-10-10 RX ADMIN — MORPHINE SULFATE 4 MG: 4 INJECTION INTRAVENOUS at 08:31

## 2019-10-10 RX ADMIN — AMPICILLIN AND SULBACTAM 3 G: 1; 2 INJECTION, POWDER, FOR SOLUTION INTRAMUSCULAR; INTRAVENOUS at 06:10

## 2019-10-10 RX ADMIN — MORPHINE SULFATE 30 MG: 30 TABLET, EXTENDED RELEASE ORAL at 17:35

## 2019-10-10 RX ADMIN — PREGABALIN 25 MG: 25 CAPSULE ORAL at 12:47

## 2019-10-10 RX ADMIN — OMEPRAZOLE 40 MG: 20 CAPSULE, DELAYED RELEASE ORAL at 17:43

## 2019-10-10 RX ADMIN — AMPICILLIN AND SULBACTAM 3 G: 1; 2 INJECTION, POWDER, FOR SOLUTION INTRAMUSCULAR; INTRAVENOUS at 12:47

## 2019-10-10 RX ADMIN — CALCIUM CITRATE 200 MG (950 MG) TABLET 950 MG: at 06:13

## 2019-10-10 RX ADMIN — MORPHINE SULFATE 4 MG: 4 INJECTION INTRAVENOUS at 21:47

## 2019-10-10 RX ADMIN — NICOTINE 14 MG: 14 PATCH, EXTENDED RELEASE TRANSDERMAL at 06:15

## 2019-10-10 RX ADMIN — MAGNESIUM HYDROXIDE 30 ML: 400 SUSPENSION ORAL at 09:07

## 2019-10-10 RX ADMIN — OXYCODONE HYDROCHLORIDE 10 MG: 10 TABLET ORAL at 15:27

## 2019-10-10 RX ADMIN — SENNOSIDES, DOCUSATE SODIUM 2 TABLET: 50; 8.6 TABLET, FILM COATED ORAL at 06:14

## 2019-10-10 ASSESSMENT — COGNITIVE AND FUNCTIONAL STATUS - GENERAL
STANDING UP FROM CHAIR USING ARMS: A LITTLE
TOILETING: A LOT
MOBILITY SCORE: 16
PERSONAL GROOMING: A LITTLE
DRESSING REGULAR UPPER BODY CLOTHING: A LOT
DRESSING REGULAR LOWER BODY CLOTHING: A LOT
MOVING TO AND FROM BED TO CHAIR: A LITTLE
WALKING IN HOSPITAL ROOM: A LITTLE
SUGGESTED CMS G CODE MODIFIER DAILY ACTIVITY: CK
TURNING FROM BACK TO SIDE WHILE IN FLAT BAD: A LITTLE
EATING MEALS: A LITTLE
HELP NEEDED FOR BATHING: A LOT
CLIMB 3 TO 5 STEPS WITH RAILING: TOTAL
DAILY ACTIVITIY SCORE: 14
MOVING FROM LYING ON BACK TO SITTING ON SIDE OF FLAT BED: A LITTLE
SUGGESTED CMS G CODE MODIFIER MOBILITY: CK

## 2019-10-10 ASSESSMENT — ENCOUNTER SYMPTOMS
DIZZINESS: 0
ROS SKIN COMMENTS: STABLE
BACK PAIN: 0
COUGH: 0
CHILLS: 0
STRIDOR: 0
TINGLING: 0
MYALGIAS: 1
ABDOMINAL PAIN: 0
VOMITING: 0
CONSTIPATION: 1
TREMORS: 0
DIARRHEA: 0
PHOTOPHOBIA: 0
NERVOUS/ANXIOUS: 0
SPUTUM PRODUCTION: 0
SORE THROAT: 0
HEARTBURN: 0
SPEECH CHANGE: 0
HEMOPTYSIS: 0
NAUSEA: 0
NECK PAIN: 0
PALPITATIONS: 0
HEADACHES: 0
ORTHOPNEA: 0
BLURRED VISION: 0
MEMORY LOSS: 0
DOUBLE VISION: 0
CONSTIPATION: 0
BLOOD IN STOOL: 0
EYE PAIN: 0
FEVER: 0
SENSORY CHANGE: 0
CLAUDICATION: 0
PND: 0
SHORTNESS OF BREATH: 0
WEAKNESS: 0
DEPRESSION: 0

## 2019-10-10 ASSESSMENT — GAIT ASSESSMENTS
ASSISTIVE DEVICE: FRONT WHEEL WALKER
DEVIATION: ANTALGIC;SHUFFLED GAIT;STEP TO
GAIT LEVEL OF ASSIST: MINIMAL ASSIST
DISTANCE (FEET): 8

## 2019-10-10 NOTE — CARE PLAN
Problem: Knowledge Deficit  Goal: Knowledge of disease process/condition, treatment plan, diagnostic tests, and medications will improve  Outcome: PROGRESSING AS EXPECTED     Problem: Pain Management  Goal: Pain level will decrease to patient's comfort goal  Outcome: PROGRESSING AS EXPECTED

## 2019-10-10 NOTE — THERAPY
"Physical Therapy Treatment completed.   Bed Mobility:  Supine to Sit: Minimal Assist  Transfers: Sit to Stand: Minimal Assist  Gait: Level Of Assist: Minimal Assist with Front-Wheel Walker       Plan of Care: Will benefit from Physical Therapy 5 times per week  Discharge Recommendations: Equipment: Will Continue to Assess for Equipment Needs. Recommend post-acute placement for continued physical therapy services prior to discharge home. Patient can tolerate post-acute therapies at a 5x/week frequency.       See \"Rehab Therapy-Acute\" Patient Summary Report for complete documentation.     Pt is now s/p I&D of pelvic wound and is WBAT for BLE. Pt is progressing slower than expected and continues to be limited by pain. Pt was able to tolerate standing staticly on BLE for about 5 mins and participate in ambulation for about 8 ft. Pt required Min to Mod A for all functional mobility. Pt educated on continuing to perform supine ther-ex and avoid keeping knees in bent postion with pillows underneath for long periods of time. Pt receptive to edcuation. Pt will continue to benefit from skilled PT while in house with recommendation for post acute therapy prior to d/c home given current objective findings.     "

## 2019-10-10 NOTE — PROGRESS NOTES
Hospital Medicine Daily Progress Note    Date of Service  10/10/2019    Chief Complaint  44 y.o. male admitted 9/27/2019 with left thigh pain    Hospital Course    44 y.o. male who presented to the emergency department on 9/27/2019 for evaluation of worsening left leg pain.  Patient had a fall about a month ago.  Patient has a problem with his right knee, on schedule for total knee replacement and ever since walking with a cane or walker. He had increasing pain in the left thigh and CT scan showed a left pubic ramus fracture with two large hematomas. Orthopedic surgery did see the patient and recommends no surgical intervention.  The patient had a rising white cell count and increasing pain so hematoma drainage was done by interventional radiology and tiffanie pus was drained consistent with an abscess. Cultures grew MSSA and drain was left in place.      Interval Problem Update  No acute issues overnight, patient status he still has 5 out of 10 pain in his left thigh area, however pain is well controlled with his current regimen.  Patient is followed by ID, continue IV antibiotics  Waiting for surgical intervention by orthopedics    10/9  Patient says he feels the same, pain is better controlled but still has bouts of pain 5-7/10 left thigh/groin area.   Awaiting for surgery today. Denies itchiness around skin. Just also complaining of rheumatoid arthritis joint discomfort in his right wrist    10/10  Patient is complaining of 8 out of 10 pain located in his left hip and groin area.  He says that the current pain medications that we have intermittently are controlling his symptoms.  Patient underwent a irrigation of debridement of his pelvic wound area, as well as a bone biopsy to rule out osteomyelitis.    Consultants/Specialty  Orthopedic surgery Dr. Olivares  Radiology Dr. Olguin    Code Status  Full Code    Disposition  TBD    Review of Systems  Review of Systems   Constitutional: Positive for malaise/fatigue.  Negative for chills and fever.   HENT: Negative for congestion, hearing loss, sore throat and tinnitus.    Eyes: Negative for blurred vision, double vision, photophobia and pain.   Respiratory: Negative for cough, hemoptysis, sputum production, shortness of breath and stridor.    Cardiovascular: Negative for chest pain, palpitations, orthopnea, claudication, leg swelling and PND.   Gastrointestinal: Negative for abdominal pain, blood in stool, constipation, heartburn, melena, nausea and vomiting.   Genitourinary: Negative for dysuria, frequency and urgency.   Musculoskeletal: Positive for joint pain (hands, and fingers) and myalgias (thigh pain). Negative for back pain and neck pain.        Left thigh pain   Skin: Positive for rash (improving overall). Negative for itching.        Mild skin peeling over his left thigh area   Neurological: Negative for dizziness, tingling, tremors, sensory change, speech change, weakness and headaches.   Psychiatric/Behavioral: Negative for depression, memory loss and suicidal ideas. The patient is not nervous/anxious.    All other systems reviewed and are negative.       Physical Exam  Temp:  [36.6 °C (97.9 °F)-37.2 °C (99 °F)] 37.1 °C (98.7 °F)  Pulse:  [67-93] 93  Resp:  [14-18] 14  BP: ()/(48-80) 102/48  SpO2:  [92 %-100 %] 96 %    Physical Exam   Constitutional: He is oriented to person, place, and time. He appears well-developed and well-nourished. No distress.   HENT:   Head: Normocephalic and atraumatic.   Mouth/Throat: No oropharyngeal exudate.   Eyes: Pupils are equal, round, and reactive to light. Conjunctivae and EOM are normal. Right eye exhibits no discharge. No scleral icterus.   Neck: Neck supple. No JVD present. No tracheal deviation present. No thyromegaly present.   Cardiovascular: Normal rate, regular rhythm and intact distal pulses.   No murmur heard.  Pulses:       Dorsalis pedis pulses are 2+ on the right side, and 2+ on the left side.   Cap refill < 3 s    Pulmonary/Chest: Effort normal and breath sounds normal. No stridor. No respiratory distress. He has no wheezes. He has no rales.   Abdominal: Soft. Bowel sounds are normal. He exhibits no distension. There is no tenderness. There is no rebound.   Musculoskeletal: Normal range of motion. He exhibits tenderness. He exhibits no edema.   Left thigh tenderness, drain in place, no drainage otherwise, no surrounding erythema     Neurological: He is alert and oriented to person, place, and time. Coordination normal.   Difficulty bearing weight on left leg due to pain   Skin: Skin is warm and dry. Rash (improving.) noted. He is not diaphoretic. No erythema. No pallor.   Macular rash over trunk fading, improving compared to yesterday     Psychiatric: He has a normal mood and affect. His behavior is normal. Thought content normal.   Nursing note and vitals reviewed.      Fluids    Intake/Output Summary (Last 24 hours) at 10/10/2019 0711  Last data filed at 10/10/2019 0623  Gross per 24 hour   Intake 1000 ml   Output 2935 ml   Net -1935 ml       Laboratory  Recent Labs     10/08/19  0421 10/09/19  0610 10/10/19  0405   WBC 8.0 6.6 6.4   RBC 3.45* 3.34* 3.25*   HEMOGLOBIN 9.0* 8.8* 8.6*   HEMATOCRIT 30.3* 29.3* 28.6*   MCV 87.8 87.7 88.0   MCH 26.1* 26.3* 26.5*   MCHC 29.7* 30.0* 30.1*   RDW 65.7* 66.0* 66.9*   PLATELETCT 352 344 336   MPV 8.4* 8.4* 8.5*     Recent Labs     10/08/19  0421 10/09/19  0427 10/10/19  0405   SODIUM 138 135 139   POTASSIUM 4.3 4.4 4.3   CHLORIDE 99 97 101   CO2 28 27 27   GLUCOSE 91 100* 109*   BUN 12 11 10   CREATININE 0.68 0.60 0.67   CALCIUM 8.8 8.5 8.3*                   Imaging  IR-PICC LINE PLACEMENT W/ GUIDANCE > AGE 5   Final Result                  Ultrasound-guided PICC placement performed by qualified nursing staff as    above.          CT-ABDOMEN-PELVIS WITH   Final Result      1.  Interval decrease in the intramuscular abscesses involving the left pelvis and thigh following drain  placement. Collection in the pubococcygeus muscle is similar in size to the prior exam.      2.  Ventral hernia containing anterior bowel wall and fat. No secondary obstruction.      US-EXTREMITY NON VASCULAR UNILATERAL RIGHT   Final Result      Loculated collection in the posteromedial left knee likely represents a Baker's cyst. No significant knee effusion is identified in the upper knee joint on recent MRI.      MR-FEMUR-W/O LEFT   Final Result      1.  Limited exam secondary to significant patient motion.   2.  Subacute fracture involving the left pubic bone with osteomyelitis and probable intraosseous abscess.   3.  Extensive edema and enlargement of the muscles of the medial compartment, pectineus muscle, and pubococcygeus muscle is consistent with known intramuscular abscesses. Fluid collection is most extensive in the adductor longus muscle.   4.  Subacute fractures of the sacrum               Comment: Results given to Dr. Varma through [place] text at 3:56 PM      MR-PELVIS W/O   Final Result      1.  Limited exam secondary to significant patient motion.   2.  Subacute fracture involving the left pubic bone with osteomyelitis and probable intraosseous abscess.   3.  Extensive edema and enlargement of the muscles of the medial compartment, pectineus muscle, and pubococcygeus muscle is consistent with known intramuscular abscesses. Fluid collection is most extensive in the adductor longus muscle.   4.  Subacute fractures of the sacrum               Comment: Results given to Dr. Varma through [place] text at 3:56 PM      CT-DRAIN-HEMATOMA - SEROMA   Final Result      1.  Left inguinal region muscular abscess.   2.  CT-guided drainage and catheter placement of large collection in the left obturator externus.   3.  CT-guided aspiration of smaller collection in the left pectineus muscle.      US-RUQ   Final Result      Hepatomegaly.      Status post cholecystectomy.      Dilated portal vein.      Status post  cholecystectomy.      No biliary ductal dilatation.      Limited evaluation of the pancreas which is obscured.      MR-THORACIC SPINE-W/O   Final Result      1.  No acute fracture in the thoracic spine.   2.  Mild chronic compression deformity at T11.      MR-LUMBAR SPINE-W/O   Final Result      1.  Acute bilateral sacral alae fractures.   2.  There is edema in the lower posterior paraspinal soft tissue likely representing soft tissue contusion.   3.  There is no lumbar spine fractures.         CT-LSPINE W/O PLUS RECONS   Final Result      No acute fracture or traumatic malalignment.      CT-PELVIS WITH   Final Result      1.  Recent but probably subacute fracture of the left superior pubic ramus near the symphysis. Adjacent and just lateral to this are two complex masslike structures, the largest and most medial of which contains a fluid-filled cavity. These are    consistent with hematomata, possibly intramuscular.      US-EXTREMITY VENOUS LOWER UNILAT LEFT   Final Result      1.  No evidence of Left  lower extremity deep venous thrombosis.   2.  Two complex areas within the left groin, consistent with hematomata.            INTERPRETING LOCATION: [address] [facility], [facility], [facility]           Assessment/Plan  * Pubic ramus fracture (HCC)  Assessment & Plan  S/p mechanical GLF  MS Continue 30mg BID continue with oxycodone for breakthrough  Added 15mg of MS contin in between his 30mg BID  PRN IV morphine  PT/OT today or carol, recommend giving pain medications before PT for pain reduction and increased participation.     Osteomyelitis (HCC)  Assessment & Plan  MRI femus shows Subacute fracture involving the left pubic bone with osteomyelitis and probable intraosseous abscess. Extensive edema and enlargement of the muscles of the medial compartment, pectineus muscle, and pubococcygeus muscle is consistent with known intramuscular abscesses. Fluid collection is most extensive in the adductor longus muscle.  ID  following  Resume IV unasyn for now   S/p I&D and bone biopsy 10/9      Candida rash of groin  Assessment & Plan  Slowly improving.   Resume Diflucan or nyastatin powder.    Sepsis (HCC)  Assessment & Plan  This is Sepsis Not present on admission  SIRS criteria identified on my evaluation include: Tachycardia, with heart rate greater than 90 BPM and Leukocyosis, with WBC greater than 12,000  Source is left thigh abscess  Leukocytosis resolved  CTM    Abscess of left thigh  Assessment & Plan  Multiple abscesses found on imaging CT/MRI  Cultures +MSSA  Resume IV unasyn  ID following and surgery, as above, I&D 10/9, awaiting on cultures.     Hematoma of left thigh  Assessment & Plan  2/2 to GLF  Now has infected hematoma  Resume IV abx as above      Bilateral leg edema  Assessment & Plan  Stable,   Check serum albumin    Iron deficiency anemia, unspecified iron deficiency anemia type- (present on admission)  Assessment & Plan  Stable, last admit he received IV iron.  No signs of gross bleeding.   Hgb 8.6<8.8<9.0, monitor closely.   Resume nutritional support       Essential hypertension- (present on admission)  Assessment & Plan  Well Controlled   Resume  Coreg, Losartan and lasix.     Chronic pain of right knee- (present on admission)  Assessment & Plan  Chronically on MS contin plus short acting oxycodone for breakthrough pain  Resume IV prn ,     Rheumatoid arthritis (HCC)- (present on admission)  Assessment & Plan  Continue Humira as outpatient       The total time spent face to face with this patient was about 34 mins of which 60% of time was spent on counseling, review of records including pertinent lab data and studies, as well as discussing diagnostic evaluation and work up, planned therapeutic interventions and future disposition of care. Where indicated, the assessment and plan reflect discussion of patient with consultants, other healthcare providers, family members, and additional research needed to obtain  further information in formulating the plan of care of this patient.       VTE prophylaxis: lovenox

## 2019-10-10 NOTE — PROGRESS NOTES
Received report from Day RN and assumed care of patient.   He was fatigued and in pain but alert and oriented.  Pain medications given by previous nurse at change of shift.  IV is patent and infusing.  BRAXTON in place and bandages CD&I.  Bowel sounds hypoactive.  Plan of care reviewed and questions answered.  Hourly rounding in place.

## 2019-10-10 NOTE — PROGRESS NOTES
Infectious Disease Progress Note    Author: Jessica Goldman M.D. Date & Time of service: 10/10/2019  10:45 AM    Chief Complaint:  Follow-up for left thigh abscess/pelvic OM    Interval History:  44 y.o. man with a history of rheumatoid arthritis on Humira, chronic right knee pain with plans for total right knee arthroplasty, and recent fall sustained 1 month prior to admission admitted for worsening left thigh/groin pain.  Patient found to have multiple fluid collections on imaging which were concerning for an abscess.  He underwent CT-guided drainage on 10/2 with cultures growing MSSA.    10/6 afebrile WBC 12.7 ongoing left groin pain however the crampy pain is somewhat decreased and he is able to ambulate better.  Plan for MRI of the pelvis and femur today  10/7 afebrile WBC 11.4 patient is very anxious after hearing about results of MRI.  MRI of the pelvis reveals osteomyelitis.  Awaiting surgical evaluation for possible surgical debridement.  Patient states left thigh pain remains unchanged and requiring multiple pain medications.  Pain worse with standing  10/8 afebrile WBC 8 patient is agreeable to surgery.  Repeat CT scan shows interval decrease and left thigh abscess.  Minimal output in drain.  Waiting for PICC line  10/9 AF WBC 6.6 plan for surgery today, also has flare up of rash on chest associated with itching  10/10 afebrile WBC 6.4 patient is complaining of significant postop pain.  He underwent debridement yesterday.  He is also complaining of constipation and has not had any bowel movement since Monday.  Wife at the bedside states that surgeon mentioned bone looked infected    Labs Reviewed.    Review of Systems:  Review of Systems   Constitutional: Negative for chills and fever.   Respiratory: Negative for cough and shortness of breath.    Gastrointestinal: Positive for constipation. Negative for abdominal pain, diarrhea, nausea and vomiting.   Musculoskeletal: Positive for myalgias.   Skin:  Positive for itching and rash.        Stable   Neurological: Negative for dizziness and headaches.   All other systems reviewed and are negative.      Hemodynamics:  Temp (24hrs), Av.7 °C (98.1 °F), Min:36.6 °C (97.9 °F), Max:37.2 °C (99 °F)  Temperature: 37.1 °C (98.7 °F)  Pulse  Av.6  Min: 50  Max: 105Heart Rate (Monitored): 79  Blood Pressure: 110/73       Physical Exam:  Physical Exam   Constitutional: He is oriented to person, place, and time. He appears well-developed.   Pallor  Looks tired   HENT:   Mouth/Throat: No oropharyngeal exudate.   Eyes: Pupils are equal, round, and reactive to light. Conjunctivae and EOM are normal.   Neck: Neck supple.   Cardiovascular: Normal rate, regular rhythm and normal heart sounds.   Pulmonary/Chest: Effort normal and breath sounds normal.   Abdominal: Soft.   Intertrigo in skin folds   Musculoskeletal: He exhibits tenderness. He exhibits no edema.   Left groin/thigh drain  + Tenderness to palpation in left groin.  No edema    Candidiasis on plantar aspect of both feet    LUE PICC line - nontender, no surrounding erythema   Neurological: He is alert and oriented to person, place, and time. No cranial nerve deficit.   Skin: Skin is warm and dry. Rash noted.   Faint spot macular rash on chest associated with overlying dry patches   Psychiatric:   Pleasant         Meds:    Current Facility-Administered Medications:   •  morphine ER  •  oxyCODONE immediate-release  •  enoxaparin  •  miconazole  •  ampicillin-sulbactam (UNASYN) IV  •  vitamin D  •  calcium citrate  •  morphine ER  •  ferrous sulfate  •  furosemide  •  losartan  •  omeprazole  •  pregabalin  •  senna-docusate **AND** polyethylene glycol/lytes **AND** magnesium hydroxide **AND** bisacodyl  •  acetaminophen  •  nicotine **AND** Nicotine Replacement Patient Education Materials **AND** nicotine polacrilex  •  morphine injection    Labs:  Recent Labs     10/08/19  0421 10/09/19  0610 10/10/19  0405   WBC 8.0  6.6 6.4   RBC 3.45* 3.34* 3.25*   HEMOGLOBIN 9.0* 8.8* 8.6*   HEMATOCRIT 30.3* 29.3* 28.6*   MCV 87.8 87.7 88.0   MCH 26.1* 26.3* 26.5*   RDW 65.7* 66.0* 66.9*   PLATELETCT 352 344 336   MPV 8.4* 8.4* 8.5*   NEUTSPOLYS 54.10 51.70 64.60   LYMPHOCYTES 29.40 30.20 21.20*   MONOCYTES 9.40 10.80 9.60   EOSINOPHILS 0.80 1.40 1.40   BASOPHILS 1.10 1.10 0.80     Recent Labs     10/08/19  0421 10/09/19  0427 10/10/19  0405   SODIUM 138 135 139   POTASSIUM 4.3 4.4 4.3   CHLORIDE 99 97 101   CO2 28 27 27   GLUCOSE 91 100* 109*   BUN 12 11 10     Recent Labs     10/08/19  0421 10/09/19  0427 10/10/19  0405   ALBUMIN  --   --  2.5*   TBILIRUBIN  --   --  0.2   ALKPHOSPHAT  --   --  233*   TOTPROTEIN  --   --  6.2   ALTSGPT  --   --  69*   ASTSGOT  --   --  70*   CREATININE 0.68 0.60 0.67       Imaging:  Ct-drain-hematoma - Seroma    Result Date: 10/2/2019  10/2/2019 12:14 PM HISTORY/REASON FOR EXAM:  Left inguinal fluid collection. Moderate sedation was administered with continuous monitoring of the patient under the direct supervision of  Total sedation time: 30 minutes The biopsy/drainage was done utilizing low dose optimization CT techniques including auto modulation for  imaging and low mA CT/fluoroscopy mode for the procedure. PROCEDURE: After the informed consent the patient was placed on the CT table on supine position. The skin over the left groin was prepped. Localization CT scan demonstrates fluid collection in the left obturator externus and pectineus muscles. Subsequently after injecting lidocaine needle was advanced into the larger fluid collection. Pus was aspirated. Subsequently a wire was introduced. After dilating the tract an 8 Yoruba drain was introduced and placed with its loop in the fluid collection. An approximately 100 mL of fluid was aspirated. Subsequently under the needle was advanced into the superficial/pectineus muscle fluid collection. An approximately 10 mL of fluid was  aspirated. No drain was placed in the superficial fluid collection. A sample of the material was sent to the microbe allergy. The patient tolerated the procedure without any immediate complication.     1.  Left inguinal region muscular abscess. 2.  CT-guided drainage and catheter placement of large collection in the left obturator externus. 3.  CT-guided aspiration of smaller collection in the left pectineus muscle.    Ct-lspine W/o Plus Recons    Result Date: 9/28/2019 9/28/2019 4:58 AM HISTORY/REASON FOR EXAM: Back pain after recent fall TECHNIQUE/EXAM DESCRIPTION: CT lumbar spine without contrast, with reconstructions. Noncontrast helical scanning was obtained from T12 through the sacrum.  Sagittal reconstructions were generated from the axial images. Low dose optimization technique was utilized for this CT exam including automated exposure control and adjustment of the mA and/or kV according to patient size. COMPARISON:  None. FINDINGS: The bones are markedly osteopenic. There is a minimal compression deformity of the superior endplate of the L2 body, which is old. There is no evidence of acute fracture. There is multilevel interspace and facet arthropathy.     No acute fracture or traumatic malalignment.    Ct-pelvis With    Result Date: 9/28/2019 9/28/2019 3:21 AM HISTORY/REASON FOR EXAM:  significant left groin pain, possible hematoma on us. TECHNIQUE/EXAM DESCRIPTION AND NUMBER OF VIEWS: CT scan of the pelvis with contrast. Contrast-enhanced helical scanning of the pelvis was obtained from the iliac crests through the pubic symphysis following the bolus administration of 100 mL of Omnipaque 350 nonionic contrast without complication. Low dose optimization technique was utilized for this CT exam including automated exposure control and adjustment of the mA and/or kV according to patient size. COMPARISON:  None. FINDINGS: In the deep left inguinal region, there is a masslike structure containing irregular  fluid-filled cavity, which measures overall about 8.6 cm in longest dimension. Within the cavity or focal areas of increased attenuation consistent with calcification. This lesion is immediately adjacent to a recent no probably subacute fracture of the left superior pubic ramus near the symphysis. Lateral to this lesion is a smaller complex mass measuring about 7.3 cm maximum dimension. There is no abnormality within the visualized pelvic cavity and there is no free intraperitoneal fluid. The bladder is intact.     1.  Recent but probably subacute fracture of the left superior pubic ramus near the symphysis. Adjacent and just lateral to this are two complex masslike structures, the largest and most medial of which contains a fluid-filled cavity. These are consistent with hematomata, possibly intramuscular.    Dx-lumbar Spine-2 Or 3 Views    Result Date: 9/16/2019 9/16/2019 4:15 PM HISTORY/REASON FOR EXAM:  Atraumatic Pain TECHNIQUE/ EXAM DESCRIPTION AND NUMBER OF VIEWS:  3 views of the lumbar spine. COMPARISON: None. FINDINGS: Dextroconvex lumbar curvature. Evaluation of the sacrum is limited due to overlying bowel content. No acute fracture. No malalignment. No compression deformity. Mild to moderate degenerative change of the lumbar spine.     Dextroconvex lumbar curvature. No acute osseous abnormality. No malalignment.    Dx-thoracic Spine-with Swimmers View    Result Date: 9/16/2019 9/16/2019 4:15 PM HISTORY/REASON FOR EXAM:  Atraumatic Pain; ?scoliotic MId back pain and low back pain after GLF x 2 weeks TECHNIQUE/EXAM DESCRIPTION AND NUMBER OF VIEWS:  Thoracic spine, 3 views. COMPARISON:  None. FINDINGS: Diffuse osseous demineralization, limiting evaluation for nondisplaced fracture. No acute fracture. No malalignment. Mild chronic height loss of the mid thoracic spine from T9 to T11. Mild degenerative change of the thoracic spine.     Mild chronic height loss of the mid thoracic spine from T9 to  T11.    Mr-lumbar Spine-w/o    Result Date: 9/28/2019 9/28/2019 6:21 PM HISTORY/REASON FOR EXAM:  back pain. TECHNIQUE/EXAM DESCRIPTION: MRI of the lumbar spine without contrast. The study was performed on a [employer] Signa 1.5 Mae MRI scanner. T1 sagittal, T2 fast spin-echo sagittal, and T2 axial images were obtained of the lumbar spine. COMPARISON:  None. FINDINGS: There are bilateral sacral alar fractures. The lumbar spine maintains normal height and alignment. At the level of L5-S1, there is no spinal or neural foraminal stenosis. At the level of L4-5, there is no spinal or neural foraminal stenosis. At the level of L3-4, there is no spinal or neural foraminal stenosis. At the level of the L2-3, there is no spinal or neural foraminal stenosis. At the level of L1-2, there is no spinal or neural foraminal stenosis. The conus terminates at the level of L1. The visualized lower thoracic spinal cord appear normal. There is edema in the posterior paraspinal soft tissue likely representing soft tissue contusion.     1.  Acute bilateral sacral alae fractures. 2.  There is edema in the lower posterior paraspinal soft tissue likely representing soft tissue contusion. 3.  There is no lumbar spine fractures.     Mr-thoracic Spine-w/o    Result Date: 9/28/2019 9/28/2019 6:21 PM HISTORY/REASON FOR EXAM:  back pain TECHNIQUE/EXAM DESCRIPTION: MRI of the thoracic spine without contrast. The study was performed on a [employer] Signa 1.5 Mae MRI scanner. T1 sagittal, T2 fast spin-echo sagittal, and T2 axial images were obtained of the thoracic spine. COMPARISON: None. FINDINGS: There is mild chronic compression deformity at T11. There is no fracture or dislocation. There is no pathologic marrow infiltration. There is no intradural extramedullary lesion. There is no abnormal intramedullary T2 signal intensity in the thoracic spinal cord.     1.  No acute fracture in the thoracic spine. 2.  Mild chronic compression deformity at  T11.    Us-extremity Venous Lower Unilat Left    Result Date: 9/28/2019  HISTORY/REASON FOR EXAM:  Leg swelling after fall TECHNIQUE/EXAM DESCRIPTION: Left lower extremity doppler venous ultrasound was performed. Using both color and pulse-wave Doppler imaging, multiple images were obtained from the common femoral vein origins distally through the popliteal trifurcations.  9/28/2019 12:54 AM COMPARISON:  None. FINDINGS: REAL-TIME GRAY-SCALE IMAGING: Real-time gray-scale imaging reveals no evidence of focal wall thickening. COLOR AND DUPLEX DOPPLER IMAGING: Graded compression was used to demonstrate patent lumens.  There is no evidence for luminal thrombus.  There is normal response to augmentation and respiratory variation. There are two rounded complex areas in the left groin region, the largest measuring about 10.9 cm diameter and the smaller, about 8.3 cm in diameter, both of which are consistent with hematomata.     1.  No evidence of Left  lower extremity deep venous thrombosis. 2.  Two complex areas within the left groin, consistent with hematomata. INTERPRETING LOCATION: [facility], [place], [address]    Us-ruq    Result Date: 10/1/2019  10/1/2019 5:49 PM HISTORY/REASON FOR EXAM:  Edema Abdominal pain TECHNIQUE/EXAM DESCRIPTION AND NUMBER OF VIEWS:  Real-time sonography of the liver and biliary tree. COMPARISON: None FINDINGS: The liver is normal in contour. There is no evidence of solid mass lesion. The liver measures 20.15 cm. The gallbladder is surgically absent. The common duct measures 0.44 cm. The pancreas is obscured by bowel gas. The visualized aorta is normal in caliber. Intrahepatic IVC is patent. The portal vein is patent with hepatopetal flow. The MPV measures 1.57 cm. The right kidney measures 11.06 cm. There is no hydronephrosis. There is no ascites.     Hepatomegaly. Status post cholecystectomy. Dilated portal vein. Status post cholecystectomy. No biliary ductal dilatation. Limited evaluation of  "the pancreas which is obscured.      Micro:  Results     Procedure Component Value Units Date/Time    Anaerobic Culture [[id number]] Collected:  10/09/19 1427    Order Status:  Completed Specimen:  Tissue Updated:  10/10/19 1017     Significant Indicator NEG     Source TISS     Site Left Pubis     Culture Result Culture in progress.    Narrative:       IRRIGATION AND DEBRIDEMENT  WOUND - PELVIC OSTEOMYELITIS  Culture Aerobic/ Anaerobic  Surgery Specimen    GRAM STAIN [[id number]] Collected:  10/09/19 1427    Order Status:  Completed Specimen:  Tissue Updated:  10/09/19 2314     Significant Indicator .     Source TISS     Site Left Pubis     Gram Stain Result Few WBCs.  No organisms seen.      Narrative:       IRRIGATION AND DEBRIDEMENT  WOUND - PELVIC OSTEOMYELITIS  Culture Aerobic/ Anaerobic  Surgery Specimen    CULTURE TISSUE W/ GRM STAIN [[id number]] Collected:  10/09/19 1427    Order Status:  Completed Specimen:  Other Updated:  10/09/19 1828    Narrative:       IRRIGATION AND DEBRIDEMENT  WOUND - PELVIC OSTEOMYELITIS  Culture Aerobic/ Anaerobic    CULTURE WOUND W/ GRAM STAIN [[id number]] Collected:  10/09/19 1427    Order Status:  Canceled Specimen:  Other     Blood Culture [[id number]] Collected:  10/02/19 1540    Order Status:  Completed Specimen:  Blood from Peripheral Updated:  10/07/19 1817     Significant Indicator NEG     Source BLD     Site Peripheral     Culture Result No growth after 5 days of incubation.  Blood culture testing and Gram stain, if indicated, are  performed at [facility], [address] [place] [address].  Positive blood cultures are  sent to [facility], [address] [place] [address], for organism identification and  susceptibility testing.      Narrative:       From different peripheral sites, if not done within the last  24 hours (Per Hospital Policy: Only change specimen source to  \"Line\" if specified by physician order)  Right AC    " "Blood Culture [[id number]] Collected:  10/02/19 1541    Order Status:  Completed Specimen:  Blood from Peripheral Updated:  10/07/19 1817     Significant Indicator NEG     Source BLD     Site Peripheral     Culture Result No growth after 5 days of incubation.  Blood culture testing and Gram stain, if indicated, are  performed at [facility], [address] [place] [address].  Positive blood cultures are  sent to [facility], [address] [place] [address], for organism identification and  susceptibility testing.      Narrative:       From different peripheral sites, if not done within the last  24 hours (Per Hospital Policy: Only change specimen source to  \"Line\" if specified by physician order)  Left Hand    Anaerobic Culture [[id number]] Collected:  10/02/19 1330    Order Status:  Completed Specimen:  Wound Updated:  10/05/19 1019     Significant Indicator NEG     Source WND     Site Left Thigh Abscess     Culture Result No Anaerobes isolated.    Narrative:       Radiology specimen    CULTURE WOUND W/ GRAM STAIN [[id number]]  (Abnormal)  (Susceptibility) Collected:  10/02/19 1330    Order Status:  Completed Specimen:  Wound Updated:  10/05/19 1019     Significant Indicator POS     Source WND     Site Left Thigh Abscess     Culture Result -     Gram Stain Result Many WBCs.  Moderate Gram positive cocci.  Rare Gram negative rods.       Culture Result Staphylococcus aureus  Moderate growth      Narrative:       Radiology specimen    Susceptibility     Staphylococcus aureus (1)     Antibiotic Interpretation Microscan Method Status    Clindamycin Sensitive <=0.5 mcg/mL MARÍA Final    Daptomycin Sensitive <=0.5 mcg/mL MARÍA Final    Erythromycin Sensitive <=0.5 mcg/mL MARÍA Final    Ampicillin/sulbactam Sensitive <=8/4 mcg/mL MARÍA Final    Moxifloxacin Intermediate 4 mcg/mL MAÍRA Final    Vancomycin Sensitive 1 mcg/mL MARÍA Final    Oxacillin Sensitive <=0.25 mcg/mL MARÍA Final    " Trimeth/Sulfa Sensitive <=0.5/9.5 mcg/mL MARÍA Final    Tetracycline Sensitive <=4 mcg/mL MARÍA Final                         Assessment:  Active Hospital Problems    Diagnosis   • *Pubic ramus fracture ([facility]) [S32.599A]   • Essential hypertension [I10]   • Rheumatoid arthritis ([facility]) [M06.9]   • Chronic pain of right knee [M25.561, G89.29]   • Candida rash of groin [B37.89]   • Abscess of left thigh [L02.416]   • Sepsis ([facility]) [A41.9]   • Hematoma of left thigh [S70.12XA]       Plan:  Osteomyelitis of the left pubic bone(noted on MRI)  Status post recent fall found to have subacute fracture on presentation  ESR - 55  Anticipate a PICC line and 6 weeks of IV antibiotics  Continue antibiotics per below  Status post I&D on 10/9/2019 by Dr. Olivares.  Follow OR cultures and pathology    Abscess of left thigh, improved  No fevers  Persistent leukocytosis, now resolved  Sustained recent fall 1 month prior to admission  Blood cultures-negative to date  Status post CT-guided drainage on 10/2  Cultures+ MSSA.  Gram stain+ GNR, GPC  Continue IV Unasyn  Repeat CT on 10/7-interval decrease in the left pelvis and thigh intramuscular abscess  DC BRAXTON drain    Chest rash  Does not appear to be consistent with drug rash 2/2 abx as pt had flare prior to admission  May be 2/2 below   On fluconazole  Monitor    Candidiasis.  On treatment  Continue p.o. fluconazole 400 mg daily  Plan 7-day course total.  Stop date 10/11/2019    Rheumatoid arthritis  On Humira prior to admission  Chronically immunosuppressed    Chronic right knee pain  Plan for future total right knee arthroplasty    Recent fall  Approximately 1 month prior to admission  Imaging on presentation revealed subacute fracture of the left pubic bone    I have performed a physical exam and reviewed and updated ROS and plan today 10/10/2019.  In review of yesterday's note 10/9/2019, there are no changes except as documented above.    Disposition: TBD    Plan of care discussed with  internal medicine/Dr. Pena and wife at bedside who is a nurse practitioner

## 2019-10-10 NOTE — THERAPY
"Occupational Therapy Treatment completed with focus on ADLs, ADL transfers, patient education and upper extremity function.  Functional Status: S/p I&D pelvic wound. Pt lethargic, A&Ox4. Spouse in room. Pt agreeable to activity. Sup to sit with ModA. Grooming EOB with Sup, set-up. STS with Lisa, FWW. 4 side-steps with Lisa, FWW. Limited activity tolerance due to abd/groin pain. BTB with Maxa.        Plan of Care: Will benefit from Occupational Therapy 3 times per week  Discharge Recommendations:  Equipment Will Continue to Assess for Equipment Needs. Post-acute therapy Discharge to a transitional care facility for continued skilled therapy services.   See \"Rehab Therapy-Acute\" Patient Summary Report for complete documentation.   "

## 2019-10-10 NOTE — CARE PLAN
Problem: Venous Thromboembolism (VTW)/Deep Vein Thrombosis (DVT) Prevention:  Goal: Patient will participate in Venous Thrombosis (VTE)/Deep Vein Thrombosis (DVT)Prevention Measures  Outcome: PROGRESSING AS EXPECTED   Patient wearing his SCD's and on the lovenox inj starting again in the morning.      Problem: Pain Management  Goal: Pain level will decrease to patient's comfort goal  Outcome: PROGRESSING SLOWER THAN EXPECTED   Patient requiring IV and oral pain medication for pain relief.

## 2019-10-10 NOTE — PROGRESS NOTES
"Quite sore near her incision and to abdominal muscles.  No new complaints other than surgical site soreness    /73   Pulse (!) 101   Temp 37.1 °C (98.7 °F) (Oral)   Resp 18   Ht 1.803 m (5' 11\")   Wt 99.6 kg (219 lb 9.3 oz)   SpO2 99%     Abdominal incision is clean dry and intact.  Stable neurovascular exam to the feet.    Plan:  Weightbearing as tolerated bilateral lower extremities.  Follow-up pathology and cultures  DVT prophylaxis with Lovenox until mobilizing independently  Follow-up in 2 weeks for wound check and suture removal  Discharge planning likely to rehab  "

## 2019-10-10 NOTE — OP REPORT
DATE OF SERVICE:  10/09/2019    PREOPERATIVE DIAGNOSIS:  Left pubic osteomyelitis.    POSTOPERATIVE DIAGNOSIS:  Left pubic osteomyelitis.    PROCEDURE PERFORMED:  Incision and drainage of pelvic osteomyelitis and   abscess, left pubis.    SURGEON:  You Olivares MD    ASSISTANT:  Gerard Banegas MD    ANESTHESIA:  General.    ESTIMATED BLOOD LOSS:  25 mL    COMPLICATIONS:  None.    OPERATIVE INDICATIONS:  The patient is a 44-year-old gentleman who has a   history of a ground level fall resulting in pelvic fractures involving the   left-sided rami and left sacral ala.  A month into these, he was developing   increasing pain and difficulty mobilizing.  His CT scan showed him to have   hematomas near the fracture site.  As he started to develop additional signs   including elevated white blood cell count, these were aspirated and were shown   to be purulent.  An MRI was done also showed a possible intraosseous abscess   to the left pubis.  We discussed diagnoses and talked about treatment options.    Treatment options include continued antibiotic therapy and serial x-rays to   observe for resolution of symptoms.  Other options would be operative   debridement to decompress the intraosseous abscess and allow better antibiotic   penetrance and remove osteomyelitic bone.  Risks of surgery to include wound   healing complications, infection, injury to nearby structures such as bladder   or neurovascular structures.  Also, risk of continued infection, possible need   for additional procedures.  He understood these risks and agreed; he wished   to proceed.    PROCEDURE IN DETAIL:  The patient was seen in the preoperative holding area on   date of surgery.  The surgical plan was discussed with the patient.  All   questions were answered.  He was taken to the operating room and placed supine   on operative table.  Anesthesia was induced and then the abdomen was prepped   and draped in normal sterile fashion.  Operative  pause was conducted.  Correct   patient, site, side, and procedure were identified as well as surgeon's   initial on operative extremity.  The patient was on scheduled antibiotics at   this point.  A Pfannenstiel incision was made across the lower abdomen just   above the pubis.  This was dissected down with Bovie cautery to reduce   bleeding from the subcutaneous tissues.  Once we reached the deep fascia, this   was split vertically in line with the linea alba.  The 2 rectus muscles were   retracted bluntly.  The bladder underneath was swept free of any adhesions to   this area.  This was also swept off the anterior aspect of the pubic bones.    Once the soft tissues were mobilized, we placed a Hohmann retractor to the   left pubis to retract the rectus muscle.  A malleable retractor with a   moistened sponge was used to retract the bladder.  There was thickened   periosteum over the left pubis.  This was consistent with his previous history   of fracture as well as an inflammatory response from infection.  We incised   this with Bovie cautery.  The periosteum was then retracted and elevated with   a Stokes elevator.  The dorsal bone was soft.  This was easily removed with a   rongeur.  We used a rongeur and removed the dorsal cortex to the point that   the bone felt more normal and showed no signs of infection.  Samples of this   cortical bone as well as the cancellous bone were sent for both cultures and   pathology.  Using a rongeur, we continued to debride any osteomyelitic   appearing bone.  By decorticating the top of the pubis and removing this   cancellous bone allowed for further drainage of any intraosseous infection.    There was some mucopurulent tissue located within the inner cavity of the   bone.  After debriding this, we then thoroughly irrigated this with normal   saline.  Once we thoroughly irrigated this, no further purulent tissue was   noted.  At this point, we turned our attention to the wound  closure.  We   ensured that all deep tissues were coagulated prior to closure.  We then   reapproximated the rectus muscles and their fascia with a #1 PDS suture.  The   adipose tissue, the Pfannenstiel incision was closed with 0 PDS to reduce dead   space and spacer for fluid collection.  We then closed the subcutaneous   tissues with a running 2-0 PDS and then reapproximated the skin with staples.    Sterile dressings were placed.  The patient was allowed to awaken in the   operating room and taken to PACU in stable condition.    POSTOPERATIVE PLAN:  He will follow cultures and pathology results.  Continue   antibiotics.  He will be weightbearing as tolerated to bilateral lower   extremities.  Work with physical therapy and mobilization.       ____________________________________     MD FILIBERTO Hewitt / ZACKERY    DD:  10/10/2019 08:12:22  DT:  10/10/2019 11:17:43    D#:  [id number]  Job#:  [id number]

## 2019-10-10 NOTE — CARE PLAN
Problem: Pain Management  Goal: Pain level will decrease to patient's comfort goal  Outcome: PROGRESSING SLOWER THAN EXPECTED     Problem: Fluid Volume:  Goal: Will maintain balanced intake and output  Outcome: PROGRESSING AS EXPECTED     Problem: Mobility  Goal: Risk for activity intolerance will decrease  Outcome: PROGRESSING AS EXPECTED     Pt POD #1 after I and D. Pt resting in bed due to pain, hospitalist increased MS contin to 15 mg BID at 1200 and 0000. Pt offered Ice to help with pain and OT worked with pt. Pt states pain has not decreased below a 5 for a long time. Even with medication increases. Family at bedside.

## 2019-10-10 NOTE — PROGRESS NOTES
Report form JENNIFER Norris RN.  Pt awake and alert. PICC is patent and infusing.  BRAXTON in place and bandages CD&I.    Plan of care reviewed and questions answered.  Hourly rounding in place

## 2019-10-11 LAB
ANION GAP SERPL CALC-SCNC: 10 MMOL/L (ref 0–11.9)
ANISOCYTOSIS BLD QL SMEAR: ABNORMAL
BASOPHILS # BLD AUTO: 0.7 % (ref 0–1.8)
BASOPHILS # BLD: 0.05 K/UL (ref 0–0.12)
BUN SERPL-MCNC: 11 MG/DL (ref 8–22)
CALCIUM SERPL-MCNC: 8.4 MG/DL (ref 8.4–10.2)
CHLORIDE SERPL-SCNC: 100 MMOL/L (ref 96–112)
CO2 SERPL-SCNC: 27 MMOL/L (ref 20–33)
COMMENT 1642: NORMAL
CREAT SERPL-MCNC: 0.55 MG/DL (ref 0.5–1.4)
EOSINOPHIL # BLD AUTO: 0.1 K/UL (ref 0–0.51)
EOSINOPHIL NFR BLD: 1.5 % (ref 0–6.9)
ERYTHROCYTE [DISTWIDTH] IN BLOOD BY AUTOMATED COUNT: 65.2 FL (ref 35.9–50)
GLUCOSE SERPL-MCNC: 100 MG/DL (ref 65–99)
GRAM STN SPEC: NORMAL
HCT VFR BLD AUTO: 28.5 % (ref 42–52)
HGB BLD-MCNC: 8.6 G/DL (ref 14–18)
IMM GRANULOCYTES # BLD AUTO: 0.1 K/UL (ref 0–0.11)
IMM GRANULOCYTES NFR BLD AUTO: 1.5 % (ref 0–0.9)
LYMPHOCYTES # BLD AUTO: 1.68 K/UL (ref 1–4.8)
LYMPHOCYTES NFR BLD: 25.1 % (ref 22–41)
MACROCYTES BLD QL SMEAR: ABNORMAL
MCH RBC QN AUTO: 26.3 PG (ref 27–33)
MCHC RBC AUTO-ENTMCNC: 30.2 G/DL (ref 33.7–35.3)
MCV RBC AUTO: 87.2 FL (ref 81.4–97.8)
MICROCYTES BLD QL SMEAR: ABNORMAL
MONOCYTES # BLD AUTO: 0.83 K/UL (ref 0–0.85)
MONOCYTES NFR BLD AUTO: 12.4 % (ref 0–13.4)
NEUTROPHILS # BLD AUTO: 3.93 K/UL (ref 1.82–7.42)
NEUTROPHILS NFR BLD: 58.8 % (ref 44–72)
NRBC # BLD AUTO: 0 K/UL
NRBC BLD-RTO: 0 /100 WBC
OVALOCYTES BLD QL SMEAR: NORMAL
PLATELET # BLD AUTO: 342 K/UL (ref 164–446)
PLATELET BLD QL SMEAR: NORMAL
PMV BLD AUTO: 8.1 FL (ref 9–12.9)
POIKILOCYTOSIS BLD QL SMEAR: NORMAL
POLYCHROMASIA BLD QL SMEAR: NORMAL
POTASSIUM SERPL-SCNC: 4.1 MMOL/L (ref 3.6–5.5)
RBC # BLD AUTO: 3.27 M/UL (ref 4.7–6.1)
RBC BLD AUTO: PRESENT
SIGNIFICANT IND 70042: NORMAL
SITE SITE: NORMAL
SODIUM SERPL-SCNC: 137 MMOL/L (ref 135–145)
SOURCE SOURCE: NORMAL
WBC # BLD AUTO: 6.7 K/UL (ref 4.8–10.8)

## 2019-10-11 PROCEDURE — 700111 HCHG RX REV CODE 636 W/ 250 OVERRIDE (IP): Performed by: INTERNAL MEDICINE

## 2019-10-11 PROCEDURE — 99233 SBSQ HOSP IP/OBS HIGH 50: CPT | Performed by: HOSPITALIST

## 2019-10-11 PROCEDURE — A9270 NON-COVERED ITEM OR SERVICE: HCPCS | Performed by: INTERNAL MEDICINE

## 2019-10-11 PROCEDURE — 700102 HCHG RX REV CODE 250 W/ 637 OVERRIDE(OP): Performed by: HOSPITALIST

## 2019-10-11 PROCEDURE — 85025 COMPLETE CBC W/AUTO DIFF WBC: CPT

## 2019-10-11 PROCEDURE — 99232 SBSQ HOSP IP/OBS MODERATE 35: CPT | Performed by: INTERNAL MEDICINE

## 2019-10-11 PROCEDURE — A9270 NON-COVERED ITEM OR SERVICE: HCPCS | Performed by: HOSPITALIST

## 2019-10-11 PROCEDURE — 700102 HCHG RX REV CODE 250 W/ 637 OVERRIDE(OP): Performed by: INTERNAL MEDICINE

## 2019-10-11 PROCEDURE — 770001 HCHG ROOM/CARE - MED/SURG/GYN PRIV*

## 2019-10-11 PROCEDURE — 700111 HCHG RX REV CODE 636 W/ 250 OVERRIDE (IP): Performed by: HOSPITALIST

## 2019-10-11 PROCEDURE — 700105 HCHG RX REV CODE 258: Performed by: INTERNAL MEDICINE

## 2019-10-11 PROCEDURE — 80048 BASIC METABOLIC PNL TOTAL CA: CPT

## 2019-10-11 RX ADMIN — MORPHINE SULFATE 30 MG: 30 TABLET, EXTENDED RELEASE ORAL at 17:56

## 2019-10-11 RX ADMIN — CALCIUM CITRATE 200 MG (950 MG) TABLET 950 MG: at 05:41

## 2019-10-11 RX ADMIN — AMPICILLIN AND SULBACTAM 3 G: 1; 2 INJECTION, POWDER, FOR SOLUTION INTRAMUSCULAR; INTRAVENOUS at 05:44

## 2019-10-11 RX ADMIN — SENNOSIDES, DOCUSATE SODIUM 2 TABLET: 50; 8.6 TABLET, FILM COATED ORAL at 05:41

## 2019-10-11 RX ADMIN — MORPHINE SULFATE 4 MG: 4 INJECTION INTRAVENOUS at 18:45

## 2019-10-11 RX ADMIN — ACETAMINOPHEN 650 MG: 325 TABLET, FILM COATED ORAL at 18:45

## 2019-10-11 RX ADMIN — OMEPRAZOLE 40 MG: 20 CAPSULE, DELAYED RELEASE ORAL at 05:40

## 2019-10-11 RX ADMIN — ENOXAPARIN SODIUM 40 MG: 100 INJECTION SUBCUTANEOUS at 05:44

## 2019-10-11 RX ADMIN — AMPICILLIN AND SULBACTAM 3 G: 1; 2 INJECTION, POWDER, FOR SOLUTION INTRAMUSCULAR; INTRAVENOUS at 00:46

## 2019-10-11 RX ADMIN — MORPHINE SULFATE 30 MG: 30 TABLET, EXTENDED RELEASE ORAL at 05:41

## 2019-10-11 RX ADMIN — OMEPRAZOLE 40 MG: 20 CAPSULE, DELAYED RELEASE ORAL at 17:57

## 2019-10-11 RX ADMIN — MORPHINE SULFATE 4 MG: 4 INJECTION INTRAVENOUS at 08:43

## 2019-10-11 RX ADMIN — MAGNESIUM HYDROXIDE 30 ML: 400 SUSPENSION ORAL at 05:48

## 2019-10-11 RX ADMIN — VITAMIN D, TAB 1000IU (100/BT) 1000 UNITS: 25 TAB at 05:40

## 2019-10-11 RX ADMIN — OXYCODONE HYDROCHLORIDE 10 MG: 10 TABLET ORAL at 11:10

## 2019-10-11 RX ADMIN — OXYCODONE HYDROCHLORIDE 10 MG: 10 TABLET ORAL at 05:41

## 2019-10-11 RX ADMIN — BISACODYL 10 MG: 10 SUPPOSITORY RECTAL at 14:36

## 2019-10-11 RX ADMIN — MORPHINE SULFATE 15 MG: 15 TABLET, EXTENDED RELEASE ORAL at 12:16

## 2019-10-11 RX ADMIN — PREGABALIN 25 MG: 25 CAPSULE ORAL at 11:10

## 2019-10-11 RX ADMIN — MICONAZOLE NITRATE: 20 CREAM TOPICAL at 18:45

## 2019-10-11 RX ADMIN — MICONAZOLE NITRATE: 20 CREAM TOPICAL at 08:50

## 2019-10-11 RX ADMIN — PREGABALIN 25 MG: 25 CAPSULE ORAL at 05:40

## 2019-10-11 RX ADMIN — NICOTINE 14 MG: 14 PATCH, EXTENDED RELEASE TRANSDERMAL at 05:41

## 2019-10-11 RX ADMIN — AMPICILLIN AND SULBACTAM 3 G: 1; 2 INJECTION, POWDER, FOR SOLUTION INTRAMUSCULAR; INTRAVENOUS at 11:12

## 2019-10-11 RX ADMIN — FUROSEMIDE 20 MG: 40 TABLET ORAL at 16:08

## 2019-10-11 RX ADMIN — OXYCODONE HYDROCHLORIDE 10 MG: 10 TABLET ORAL at 16:08

## 2019-10-11 RX ADMIN — MORPHINE SULFATE 4 MG: 4 INJECTION INTRAVENOUS at 03:05

## 2019-10-11 RX ADMIN — OXYCODONE HYDROCHLORIDE 10 MG: 10 TABLET ORAL at 21:54

## 2019-10-11 RX ADMIN — LOSARTAN POTASSIUM 25 MG: 25 TABLET, FILM COATED ORAL at 05:41

## 2019-10-11 RX ADMIN — AMPICILLIN AND SULBACTAM 3 G: 1; 2 INJECTION, POWDER, FOR SOLUTION INTRAMUSCULAR; INTRAVENOUS at 17:56

## 2019-10-11 RX ADMIN — SENNOSIDES, DOCUSATE SODIUM 2 TABLET: 50; 8.6 TABLET, FILM COATED ORAL at 17:57

## 2019-10-11 RX ADMIN — FERROUS SULFATE TAB 325 MG (65 MG ELEMENTAL FE) 325 MG: 325 (65 FE) TAB at 05:41

## 2019-10-11 RX ADMIN — PREGABALIN 25 MG: 25 CAPSULE ORAL at 17:57

## 2019-10-11 RX ADMIN — FUROSEMIDE 20 MG: 40 TABLET ORAL at 05:40

## 2019-10-11 RX ADMIN — MORPHINE SULFATE 4 MG: 4 INJECTION INTRAVENOUS at 14:21

## 2019-10-11 ASSESSMENT — ENCOUNTER SYMPTOMS
NERVOUS/ANXIOUS: 0
SPEECH CHANGE: 0
PHOTOPHOBIA: 0
STRIDOR: 0
SPUTUM PRODUCTION: 0
ROS SKIN COMMENTS: STABLE
CHILLS: 0
CONSTIPATION: 1
FEVER: 0
HEADACHES: 0
SORE THROAT: 0
PALPITATIONS: 0
CLAUDICATION: 0
DIZZINESS: 0
EYE PAIN: 0
TREMORS: 0
ORTHOPNEA: 0
VOMITING: 0
SHORTNESS OF BREATH: 0
SENSORY CHANGE: 0
MEMORY LOSS: 0
DIARRHEA: 0
DEPRESSION: 0
MYALGIAS: 1
BLURRED VISION: 0
ABDOMINAL PAIN: 0
WEAKNESS: 0
ABDOMINAL PAIN: 1
CONSTIPATION: 0
NAUSEA: 0
PND: 0
BACK PAIN: 0
HEMOPTYSIS: 0
COUGH: 0
BLOOD IN STOOL: 0
NECK PAIN: 0
DOUBLE VISION: 0
HEARTBURN: 0
TINGLING: 0

## 2019-10-11 NOTE — PROGRESS NOTES
Infectious Disease Progress Note    Author: Romie Galdamez M.D. Date & Time of service: 10/11/2019  7:42 AM    Chief Complaint:  Follow-up for left thigh abscess/pelvic OM    Interval History:  44 y.o. man with a history of rheumatoid arthritis on Humira, chronic right knee pain with plans for total right knee arthroplasty, and recent fall sustained 1 month prior to admission admitted for worsening left thigh/groin pain.  Patient found to have multiple fluid collections on imaging which were concerning for an abscess.  He underwent CT-guided drainage on 10/2 with cultures growing MSSA.    10/6 afebrile WBC 12.7 ongoing left groin pain however the crampy pain is somewhat decreased and he is able to ambulate better.  Plan for MRI of the pelvis and femur today  10/7 afebrile WBC 11.4 patient is very anxious after hearing about results of MRI.  MRI of the pelvis reveals osteomyelitis.  Awaiting surgical evaluation for possible surgical debridement.  Patient states left thigh pain remains unchanged and requiring multiple pain medications.  Pain worse with standing  10/8 afebrile WBC 8 patient is agreeable to surgery.  Repeat CT scan shows interval decrease and left thigh abscess.  Minimal output in drain.  Waiting for PICC line  10/9 AF WBC 6.6 plan for surgery today, also has flare up of rash on chest associated with itching  10/10 afebrile WBC 6.4 patient is complaining of significant postop pain.  He underwent debridement yesterday.  He is also complaining of constipation and has not had any bowel movement since Monday.  Wife at the bedside states that surgeon mentioned bone looked infected  10/11 T max 99.1. White count 6.7. Tolerating antibiotics. Pain at surgical site    Labs Reviewed.    Review of Systems:  Review of Systems   Constitutional: Negative for chills and fever.   Respiratory: Negative for cough and shortness of breath.    Gastrointestinal: Positive for abdominal pain and constipation. Negative for  diarrhea, nausea and vomiting.   Musculoskeletal: Positive for myalgias.   Skin: Positive for rash. Negative for itching.        Stable   Neurological: Negative for dizziness and headaches.   All other systems reviewed and are negative.      Hemodynamics:  Temp (24hrs), Av.9 °C (98.5 °F), Min:36.8 °C (98.2 °F), Max:37.3 °C (99.1 °F)  Temperature: 36.8 °C (98.2 °F)  Pulse  Av.9  Min: 50  Max: 105   Blood Pressure: (!) 99/65       Physical Exam:  Physical Exam   Constitutional: He is oriented to person, place, and time. He appears well-developed and well-nourished. No distress.   Pallor  Looks tired   HENT:   Mouth/Throat: No oropharyngeal exudate.   Eyes: Pupils are equal, round, and reactive to light. Conjunctivae and EOM are normal.   Neck: Neck supple.   Cardiovascular: Normal rate, regular rhythm and normal heart sounds.   Pulmonary/Chest: Effort normal and breath sounds normal.   Abdominal: Soft. He exhibits no distension.   Intertrigo in groin skin folds  Lower midline abdomen horizontal incision dressing is clean and dry   Musculoskeletal: He exhibits tenderness. He exhibits no edema.   Left groin/thigh drain  + Tenderness to palpation in left groin.  No edema    Candidiasis on plantar aspect of both feet    LUE PICC line - nontender, no surrounding erythema   Neurological: He is alert and oriented to person, place, and time. No cranial nerve deficit.   Skin: Skin is warm and dry. Rash noted. He is not diaphoretic.   Light brown macular rash on chest with some scaling with pressure, some coalescing into patches, non-pruritic  B/L soles with thick flaky skin  Scaling of skin over knuckles B/L hands   Psychiatric: He has a normal mood and affect. His behavior is normal.   Pleasant         Meds:    Current Facility-Administered Medications:   •  morphine ER  •  oxyCODONE immediate-release  •  enoxaparin  •  miconazole  •  ampicillin-sulbactam (UNASYN) IV  •  vitamin D  •  calcium citrate  •  morphine  ER  •  ferrous sulfate  •  furosemide  •  losartan  •  omeprazole  •  pregabalin  •  senna-docusate **AND** polyethylene glycol/lytes **AND** magnesium hydroxide **AND** bisacodyl  •  acetaminophen  •  nicotine **AND** Nicotine Replacement Patient Education Materials **AND** nicotine polacrilex  •  morphine injection    Labs:  Recent Labs     10/09/19  0610 10/10/19  0405 10/11/19  0308   WBC 6.6 6.4 6.7   RBC 3.34* 3.25* 3.27*   HEMOGLOBIN 8.8* 8.6* 8.6*   HEMATOCRIT 29.3* 28.6* 28.5*   MCV 87.7 88.0 87.2   MCH 26.3* 26.5* 26.3*   RDW 66.0* 66.9* 65.2*   PLATELETCT 344 336 342   MPV 8.4* 8.5* 8.1*   NEUTSPOLYS 51.70 64.60 58.80   LYMPHOCYTES 30.20 21.20* 25.10   MONOCYTES 10.80 9.60 12.40   EOSINOPHILS 1.40 1.40 1.50   BASOPHILS 1.10 0.80 0.70   RBCMORPHOLO  --   --  Present     Recent Labs     10/09/19  0427 10/10/19  0405 10/11/19  0528   SODIUM 135 139 137   POTASSIUM 4.4 4.3 4.1   CHLORIDE 97 101 100   CO2 27 27 27   GLUCOSE 100* 109* 100*   BUN 11 10 11     Recent Labs     10/09/19  0427 10/10/19  0405 10/11/19  0528   ALBUMIN  --  2.5*  --    TBILIRUBIN  --  0.2  --    ALKPHOSPHAT  --  233*  --    TOTPROTEIN  --  6.2  --    ALTSGPT  --  69*  --    ASTSGOT  --  70*  --    CREATININE 0.60 0.67 0.55       Imaging:  Ct-drain-hematoma - Seroma    Result Date: 10/2/2019  10/2/2019 12:14 PM HISTORY/REASON FOR EXAM:  Left inguinal fluid collection. Moderate sedation was administered with continuous monitoring of the patient under the direct supervision of  Total sedation time: 30 minutes The biopsy/drainage was done utilizing low dose optimization CT techniques including auto modulation for  imaging and low mA CT/fluoroscopy mode for the procedure. PROCEDURE: After the informed consent the patient was placed on the CT table on supine position. The skin over the left groin was prepped. Localization CT scan demonstrates fluid collection in the left obturator externus and pectineus muscles.  Subsequently after injecting lidocaine needle was advanced into the larger fluid collection. Pus was aspirated. Subsequently a wire was introduced. After dilating the tract an 8 [place]ian drain was introduced and placed with its loop in the fluid collection. An approximately 100 mL of fluid was aspirated. Subsequently under the needle was advanced into the superficial/pectineus muscle fluid collection. An approximately 10 mL of fluid was aspirated. No drain was placed in the superficial fluid collection. A sample of the material was sent to the microbe allergy. The patient tolerated the procedure without any immediate complication.     1.  Left inguinal region muscular abscess. 2.  CT-guided drainage and catheter placement of large collection in the left obturator externus. 3.  CT-guided aspiration of smaller collection in the left pectineus muscle.    Ct-lspine W/o Plus Recons    Result Date: 9/28/2019 9/28/2019 4:58 AM HISTORY/REASON FOR EXAM: Back pain after recent fall TECHNIQUE/EXAM DESCRIPTION: CT lumbar spine without contrast, with reconstructions. Noncontrast helical scanning was obtained from T12 through the sacrum.  Sagittal reconstructions were generated from the axial images. Low dose optimization technique was utilized for this CT exam including automated exposure control and adjustment of the mA and/or kV according to patient size. COMPARISON:  None. FINDINGS: The bones are markedly osteopenic. There is a minimal compression deformity of the superior endplate of the L2 body, which is old. There is no evidence of acute fracture. There is multilevel interspace and facet arthropathy.     No acute fracture or traumatic malalignment.    Ct-pelvis With    Result Date: 9/28/2019 9/28/2019 3:21 AM HISTORY/REASON FOR EXAM:  significant left groin pain, possible hematoma on us. TECHNIQUE/EXAM DESCRIPTION AND NUMBER OF VIEWS: CT scan of the pelvis with contrast. Contrast-enhanced helical scanning of the pelvis was  obtained from the iliac crests through the pubic symphysis following the bolus administration of 100 mL of Omnipaque 350 nonionic contrast without complication. Low dose optimization technique was utilized for this CT exam including automated exposure control and adjustment of the mA and/or kV according to patient size. COMPARISON:  None. FINDINGS: In the deep left inguinal region, there is a masslike structure containing irregular fluid-filled cavity, which measures overall about 8.6 cm in longest dimension. Within the cavity or focal areas of increased attenuation consistent with calcification. This lesion is immediately adjacent to a recent no probably subacute fracture of the left superior pubic ramus near the symphysis. Lateral to this lesion is a smaller complex mass measuring about 7.3 cm maximum dimension. There is no abnormality within the visualized pelvic cavity and there is no free intraperitoneal fluid. The bladder is intact.     1.  Recent but probably subacute fracture of the left superior pubic ramus near the symphysis. Adjacent and just lateral to this are two complex masslike structures, the largest and most medial of which contains a fluid-filled cavity. These are consistent with hematomata, possibly intramuscular.    Dx-lumbar Spine-2 Or 3 Views    Result Date: 9/16/2019 9/16/2019 4:15 PM HISTORY/REASON FOR EXAM:  Atraumatic Pain TECHNIQUE/ EXAM DESCRIPTION AND NUMBER OF VIEWS:  3 views of the lumbar spine. COMPARISON: None. FINDINGS: Dextroconvex lumbar curvature. Evaluation of the sacrum is limited due to overlying bowel content. No acute fracture. No malalignment. No compression deformity. Mild to moderate degenerative change of the lumbar spine.     Dextroconvex lumbar curvature. No acute osseous abnormality. No malalignment.    Dx-thoracic Spine-with Swimmers View    Result Date: 9/16/2019 9/16/2019 4:15 PM HISTORY/REASON FOR EXAM:  Atraumatic Pain; ?scoliotic MId back pain and low back  pain after GLF x 2 weeks TECHNIQUE/EXAM DESCRIPTION AND NUMBER OF VIEWS:  Thoracic spine, 3 views. COMPARISON:  None. FINDINGS: Diffuse osseous demineralization, limiting evaluation for nondisplaced fracture. No acute fracture. No malalignment. Mild chronic height loss of the mid thoracic spine from T9 to T11. Mild degenerative change of the thoracic spine.     Mild chronic height loss of the mid thoracic spine from T9 to T11.    Mr-lumbar Spine-w/o    Result Date: 9/28/2019 9/28/2019 6:21 PM HISTORY/REASON FOR EXAM:  back pain. TECHNIQUE/EXAM DESCRIPTION: MRI of the lumbar spine without contrast. The study was performed on a [employer] 1.5 Mae MRI scanner. T1 sagittal, T2 fast spin-echo sagittal, and T2 axial images were obtained of the lumbar spine. COMPARISON:  None. FINDINGS: There are bilateral sacral alar fractures. The lumbar spine maintains normal height and alignment. At the level of L5-S1, there is no spinal or neural foraminal stenosis. At the level of L4-5, there is no spinal or neural foraminal stenosis. At the level of L3-4, there is no spinal or neural foraminal stenosis. At the level of the L2-3, there is no spinal or neural foraminal stenosis. At the level of L1-2, there is no spinal or neural foraminal stenosis. The conus terminates at the level of L1. The visualized lower thoracic spinal cord appear normal. There is edema in the posterior paraspinal soft tissue likely representing soft tissue contusion.     1.  Acute bilateral sacral alae fractures. 2.  There is edema in the lower posterior paraspinal soft tissue likely representing soft tissue contusion. 3.  There is no lumbar spine fractures.     Mr-thoracic Spine-w/o    Result Date: 9/28/2019 9/28/2019 6:21 PM HISTORY/REASON FOR EXAM:  back pain TECHNIQUE/EXAM DESCRIPTION: MRI of the thoracic spine without contrast. The study was performed on a [employer] 1.5 Mae MRI scanner. T1 sagittal, T2 fast spin-echo sagittal, and T2 axial images  were obtained of the thoracic spine. COMPARISON: None. FINDINGS: There is mild chronic compression deformity at T11. There is no fracture or dislocation. There is no pathologic marrow infiltration. There is no intradural extramedullary lesion. There is no abnormal intramedullary T2 signal intensity in the thoracic spinal cord.     1.  No acute fracture in the thoracic spine. 2.  Mild chronic compression deformity at T11.    Us-extremity Venous Lower Unilat Left    Result Date: 9/28/2019  HISTORY/REASON FOR EXAM:  Leg swelling after fall TECHNIQUE/EXAM DESCRIPTION: Left lower extremity doppler venous ultrasound was performed. Using both color and pulse-wave Doppler imaging, multiple images were obtained from the common femoral vein origins distally through the popliteal trifurcations.  9/28/2019 12:54 AM COMPARISON:  None. FINDINGS: REAL-TIME GRAY-SCALE IMAGING: Real-time gray-scale imaging reveals no evidence of focal wall thickening. COLOR AND DUPLEX DOPPLER IMAGING: Graded compression was used to demonstrate patent lumens.  There is no evidence for luminal thrombus.  There is normal response to augmentation and respiratory variation. There are two rounded complex areas in the left groin region, the largest measuring about 10.9 cm diameter and the smaller, about 8.3 cm in diameter, both of which are consistent with hematomata.     1.  No evidence of Left  lower extremity deep venous thrombosis. 2.  Two complex areas within the left groin, consistent with hematomata. INTERPRETING LOCATION: [facility], [address]    Us-ruq    Result Date: 10/1/2019  10/1/2019 5:49 PM HISTORY/REASON FOR EXAM:  Edema Abdominal pain TECHNIQUE/EXAM DESCRIPTION AND NUMBER OF VIEWS:  Real-time sonography of the liver and biliary tree. COMPARISON: None FINDINGS: The liver is normal in contour. There is no evidence of solid mass lesion. The liver measures 20.15 cm. The gallbladder is surgically absent. The common duct measures 0.44 cm.  The pancreas is obscured by bowel gas. The visualized aorta is normal in caliber. Intrahepatic IVC is patent. The portal vein is patent with hepatopetal flow. The MPV measures 1.57 cm. The right kidney measures 11.06 cm. There is no hydronephrosis. There is no ascites.     Hepatomegaly. Status post cholecystectomy. Dilated portal vein. Status post cholecystectomy. No biliary ductal dilatation. Limited evaluation of the pancreas which is obscured.      Micro:  Results     Procedure Component Value Units Date/Time    Anaerobic Culture [[id number]] Collected:  10/09/19 1427    Order Status:  Completed Specimen:  Tissue Updated:  10/10/19 1143     Significant Indicator NEG     Source TISS     Site Left Pubis     Culture Result Culture in progress.    Narrative:       IRRIGATION AND DEBRIDEMENT  WOUND - PELVIC OSTEOMYELITIS  Culture Aerobic/ Anaerobic  Surgery Specimen    CULTURE TISSUE W/ GRM STAIN [[id number]] Collected:  10/09/19 1427    Order Status:  Completed Specimen:  Tissue Updated:  10/10/19 1143     Significant Indicator NEG     Source TISS     Site Left Pubis     Culture Result No growth at 24 hours.     Gram Stain Result Few WBCs.  No organisms seen.      Narrative:       IRRIGATION AND DEBRIDEMENT  WOUND - PELVIC OSTEOMYELITIS  Culture Aerobic/ Anaerobic  Surgery Specimen    GRAM STAIN [[id number]] Collected:  10/09/19 1427    Order Status:  Completed Specimen:  Tissue Updated:  10/09/19 2314     Significant Indicator .     Source TISS     Site Left Pubis     Gram Stain Result Few WBCs.  No organisms seen.      Narrative:       IRRIGATION AND DEBRIDEMENT  WOUND - PELVIC OSTEOMYELITIS  Culture Aerobic/ Anaerobic  Surgery Specimen    CULTURE WOUND W/ GRAM STAIN [[id number]] Collected:  10/09/19 1427    Order Status:  Canceled Specimen:  Other     Blood Culture [[id number]] Collected:  10/02/19 1540    Order Status:  Completed Specimen:  Blood from Peripheral Updated:  10/07/19 1817     Significant Indicator NEG     " Source BLD     Site Peripheral     Culture Result No growth after 5 days of incubation.  Blood culture testing and Gram stain, if indicated, are  performed at [facility], [facility]  [address] [facility].[place], [place].  Positive blood cultures are  sent to [facility], [address] [place] [address], for organism identification and  susceptibility testing.      Narrative:       From different peripheral sites, if not done within the last  24 hours (Per Hospital Policy: Only change specimen source to  \"Line\" if specified by physician order)  Right AC    Blood Culture [[id number]] Collected:  10/02/19 1541    Order Status:  Completed Specimen:  Blood from Peripheral Updated:  10/07/19 1817     Significant Indicator NEG     Source BLD     Site Peripheral     Culture Result No growth after 5 days of incubation.  Blood culture testing and Gram stain, if indicated, are  performed at [facility], [address] [place] [address].[place], [place].  Positive blood cultures are  sent to [facility], [address] [place] [address], for organism identification and  susceptibility testing.      Narrative:       From different peripheral sites, if not done within the last  24 hours (Per Hospital Policy: Only change specimen source to  \"Line\" if specified by physician order)  Left Hand    Anaerobic Culture [[id number]] Collected:  10/02/19 1330    Order Status:  Completed Specimen:  Wound Updated:  10/05/19 1019     Significant Indicator NEG     Source WND     Site Left Thigh Abscess     Culture Result No Anaerobes isolated.    Narrative:       Radiology specimen    CULTURE WOUND W/ GRAM STAIN [[id number]]  (Abnormal)  (Susceptibility) Collected:  10/02/19 1330    Order Status:  Completed Specimen:  Wound Updated:  10/05/19 1019     Significant Indicator POS     Source WND     Site Left Thigh Abscess     Culture Result -     Gram Stain Result Many WBCs.  Moderate Gram " positive cocci.  Rare Gram negative rods.       Culture Result Staphylococcus aureus  Moderate growth      Narrative:       Radiology specimen    Susceptibility     Staphylococcus aureus (1)     Antibiotic Interpretation Microscan Method Status    Clindamycin Sensitive <=0.5 mcg/mL MARÍA Final    Daptomycin Sensitive <=0.5 mcg/mL MARÍA Final    Erythromycin Sensitive <=0.5 mcg/mL MARÍA Final    Ampicillin/sulbactam Sensitive <=8/4 mcg/mL MARÍA Final    Moxifloxacin Intermediate 4 mcg/mL MARÍA Final    Vancomycin Sensitive 1 mcg/mL MARÍA Final    Oxacillin Sensitive <=0.25 mcg/mL MARÍA Final    Trimeth/Sulfa Sensitive <=0.5/9.5 mcg/mL MARÍA Final    Tetracycline Sensitive <=4 mcg/mL MARÍA Final                         Assessment:  Active Hospital Problems    Diagnosis       •    • Rheumatoid arthritis (HCC) [M06.9]   • Immunosuppressed state    Pityriasis versicolor   • Abscess of left thigh [L02.416]    Pelvic osteomyelitis    MSSA infection       Plan:  Osteomyelitis of the left pubic bone(noted on MRI)  Status post recent fall found to have subacute fracture on presentation  ESR - 55  Anticipate 6 weeks of IV antibiotics  Continue antibiotics per below  Status post I&D on 10/9/2019 by Dr. Olivares. Thickened periosteum and soft bone was encountered intra-op  Follow OR cultures and pathology - pending    Abscess of left thigh, improved  No fevers  Persistent leukocytosis, now resolved  Sustained recent fall 1 month prior to admission  Blood cultures-negative to date  Images reviewed. Initial CT with 2 complex abscesses measuring 8.6cm and 7.2cm respectively in the longest dimension  Status post CT-guided drainage on 10/2  Cultures+ MSSA.  Gram stain+ GNR, GPC - requested Micro lab to review this gram stain  Continue IV Unasyn 3g q6h for now while gram stain is being reviewed  Repeat CT on 10/7-interval decrease in the left pelvis and thigh intramuscular abscess - difficult to measure given complex nature    Pityriasis versicolor,  intertrigo  Light brown macular rash on chest and trunk with some scaling with pressure, some coalescing into patches, non-pruritic  Has been ongoing for about 2 weeks, similar presentation a month ago  Received 10 days of fluconazole  Patient is on miconazole cream for intertrigo. Recommend application to chest as well. Will require 2-4 weeks of treatment    Rheumatoid arthritis  On Humira prior to admission  Chronically immunosuppressed    Chronic right knee pain  Plan for future total right knee arthroplasty    Recent fall  Approximately 1 month prior to admission  Imaging on presentation revealed subacute fracture of the left pubic bone    Disposition: TBD, likely rehab    Plan of care discussed with internal medicine/Dr. Pena

## 2019-10-11 NOTE — CARE PLAN
Problem: Knowledge Deficit  Goal: Knowledge of disease process/condition, treatment plan, diagnostic tests, and medications will improve  Outcome: PROGRESSING AS EXPECTED     Problem: Discharge Barriers/Planning  Goal: Patient's continuum of care needs will be met  Outcome: PROGRESSING AS EXPECTED     Problem: Fluid Volume:  Goal: Will maintain balanced intake and output  Outcome: PROGRESSING AS EXPECTED

## 2019-10-11 NOTE — PROGRESS NOTES
"Has noticed some improvement pain since yesterday.  Notes constipation.  Still sore near incision site and to left groin    /81   Pulse 96   Temp 36.9 °C (98.4 °F) (Oral)   Resp 18   Ht 1.803 m (5' 11\")   Wt 99.6 kg (219 lb 9.3 oz)   SpO2 98%       Dressing is clean dry and intact.    Pathology consistent with osteomyelitis and osteonecrosis    Cultures from last surgery are still without growth    Plan:  Weight-bear as tolerated  Mobilize with PT  Antibiotics per previous cultures  Follow-up with Ortho in 2 weeks for repeat x-rays and suture removal  "

## 2019-10-11 NOTE — PROGRESS NOTES
Pt is A&Ox4. Vss. Pt c/o 7/10 pain at this time. Medicated per MAR. Dressing is clean, dry and intact. Fall precautions in place.

## 2019-10-11 NOTE — PROGRESS NOTES
[facility] Medicine Daily Progress Note    Date of Service  10/11/2019    Chief Complaint  44 y.o. male admitted 9/27/2019 with left thigh pain    Hospital Course    44 y.o. male who presented to the emergency department on 9/27/2019 for evaluation of worsening left leg pain.  Patient had a fall about a month ago.  Patient has a problem with his right knee, on schedule for total knee replacement and ever since walking with a cane or walker. He had increasing pain in the left thigh and CT scan showed a left pubic ramus fracture with two large hematomas. Orthopedic surgery did see the patient and recommends no surgical intervention.  The patient had a rising white cell count and increasing pain so hematoma drainage was done by interventional radiology and tiffanie pus was drained consistent with an abscess. Cultures grew MSSA and drain was left in place.      Interval Problem Update  No acute issues overnight, patient status he still has 5 out of 10 pain in his left thigh area, however pain is well controlled with his current regimen.  Patient is followed by ID, continue IV antibiotics  Waiting for surgical intervention by orthopedics    10/9  Patient says he feels the same, pain is better controlled but still has bouts of pain 5-7/10 left thigh/groin area.   Awaiting for surgery today. Denies itchiness around skin. Just also complaining of rheumatoid arthritis joint discomfort in his right wrist    10/10  Patient is complaining of 8 out of 10 pain located in his left hip and groin area.  He says that the current pain medications that we have intermittently are controlling his symptoms.  Patient underwent a irrigation of debridement of his pelvic wound area, as well as a bone biopsy to rule out osteomyelitis.    10/11  No acute issues overnight, patient says overall his pain has improved, now currently a 5 out of 10 at its peak, and overall in good spirits.    Consultants/Specialty  Orthopedic surgery   [place]  Radiology Dr. Olguin    Code Status  Full Code    Disposition  TBD    Review of Systems  Review of Systems   Constitutional: Positive for malaise/fatigue. Negative for chills and fever.   HENT: Negative for congestion, hearing loss, sore throat and tinnitus.    Eyes: Negative for blurred vision, double vision, photophobia and pain.   Respiratory: Negative for cough, hemoptysis, sputum production, shortness of breath and stridor.    Cardiovascular: Negative for chest pain, palpitations, orthopnea, claudication, leg swelling and PND.   Gastrointestinal: Negative for abdominal pain, blood in stool, constipation, heartburn, melena, nausea and vomiting.   Genitourinary: Negative for dysuria, frequency and urgency.   Musculoskeletal: Positive for joint pain (hands, and fingers unchanged) and myalgias (thigh pain, mild improving). Negative for back pain and neck pain.        Left thigh pain   Skin: Positive for rash (improving overall ). Negative for itching.        Mild skin peeling over his left thigh area   Neurological: Negative for dizziness, tingling, tremors, sensory change, speech change, weakness and headaches.   Psychiatric/Behavioral: Negative for depression, memory loss and suicidal ideas. The patient is not nervous/anxious.    All other systems reviewed and are negative.       Physical Exam  Temp:  [36.8 °C (98.2 °F)-37.3 °C (99.1 °F)] 36.8 °C (98.2 °F)  Pulse:  [] 75  Resp:  [16-18] 16  BP: ()/(65-91) 105/91  SpO2:  [97 %-100 %] 97 %    Physical Exam   Constitutional: He is oriented to person, place, and time. He appears well-developed and well-nourished. No distress.   HENT:   Head: Normocephalic and atraumatic.   Mouth/Throat: No oropharyngeal exudate.   Eyes: Pupils are equal, round, and reactive to light. Conjunctivae and EOM are normal. Right eye exhibits no discharge. No scleral icterus.   Neck: Neck supple. No JVD present. No tracheal deviation present. No thyromegaly present.    Cardiovascular: Normal rate, regular rhythm and intact distal pulses.   No murmur heard.  Pulses:       Dorsalis pedis pulses are 2+ on the right side, and 2+ on the left side.   Cap refill < 3 s   Pulmonary/Chest: Effort normal and breath sounds normal. No stridor. No respiratory distress. He has no wheezes. He has no rales.   Abdominal: Soft. Bowel sounds are normal. He exhibits no distension. There is no tenderness. There is no rebound.   Musculoskeletal: Normal range of motion. He exhibits no edema or tenderness.   Left thigh tenderness has improved, still patient has mildly flaky skin around that area however no erythema or swelling noted     Neurological: He is alert and oriented to person, place, and time. Coordination normal.   Difficulty bearing weight on left leg due to pain   Skin: Skin is warm and dry. Rash (improving.) noted. He is not diaphoretic. No erythema. No pallor.   Macular rash over trunk fading, improved from yesterday   Psychiatric: He has a normal mood and affect. His behavior is normal. Thought content normal.   Nursing note and vitals reviewed.      Fluids    Intake/Output Summary (Last 24 hours) at 10/11/2019 1027  Last data filed at 10/11/2019 0400  Gross per 24 hour   Intake 480 ml   Output 2975 ml   Net -2495 ml       Laboratory  Recent Labs     10/09/19  0610 10/10/19  0405 10/11/19  0308   WBC 6.6 6.4 6.7   RBC 3.34* 3.25* 3.27*   HEMOGLOBIN 8.8* 8.6* 8.6*   HEMATOCRIT 29.3* 28.6* 28.5*   MCV 87.7 88.0 87.2   MCH 26.3* 26.5* 26.3*   MCHC 30.0* 30.1* 30.2*   RDW 66.0* 66.9* 65.2*   PLATELETCT 344 336 342   MPV 8.4* 8.5* 8.1*     Recent Labs     10/09/19  0427 10/10/19  0405 10/11/19  0528   SODIUM 135 139 137   POTASSIUM 4.4 4.3 4.1   CHLORIDE 97 101 100   CO2 27 27 27   GLUCOSE 100* 109* 100*   BUN 11 10 11   CREATININE 0.60 0.67 0.55   CALCIUM 8.5 8.3* 8.4                   Imaging  IR-PICC LINE PLACEMENT W/ GUIDANCE > AGE 5   Final Result                  Ultrasound-guided PICC  placement performed by qualified nursing staff as    above.          CT-ABDOMEN-PELVIS WITH   Final Result      1.  Interval decrease in the intramuscular abscesses involving the left pelvis and thigh following drain placement. Collection in the pubococcygeus muscle is similar in size to the prior exam.      2.  Ventral hernia containing anterior bowel wall and fat. No secondary obstruction.      US-EXTREMITY NON VASCULAR UNILATERAL RIGHT   Final Result      Loculated collection in the posteromedial left knee likely represents a Baker's cyst. No significant knee effusion is identified in the upper knee joint on recent MRI.      MR-FEMUR-W/O LEFT   Final Result      1.  Limited exam secondary to significant patient motion.   2.  Subacute fracture involving the left pubic bone with osteomyelitis and probable intraosseous abscess.   3.  Extensive edema and enlargement of the muscles of the medial compartment, pectineus muscle, and pubococcygeus muscle is consistent with known intramuscular abscesses. Fluid collection is most extensive in the adductor longus muscle.   4.  Subacute fractures of the sacrum               Comment: Results given to Dr. Varma through [place] text at 3:56 PM      MR-PELVIS W/O   Final Result      1.  Limited exam secondary to significant patient motion.   2.  Subacute fracture involving the left pubic bone with osteomyelitis and probable intraosseous abscess.   3.  Extensive edema and enlargement of the muscles of the medial compartment, pectineus muscle, and pubococcygeus muscle is consistent with known intramuscular abscesses. Fluid collection is most extensive in the adductor longus muscle.   4.  Subacute fractures of the sacrum               Comment: Results given to Dr. Varma through [place] text at 3:56 PM      CT-DRAIN-HEMATOMA - SEROMA   Final Result      1.  Left inguinal region muscular abscess.   2.  CT-guided drainage and catheter placement of large collection in the left obturator  externus.   3.  CT-guided aspiration of smaller collection in the left pectineus muscle.      US-RUQ   Final Result      Hepatomegaly.      Status post cholecystectomy.      Dilated portal vein.      Status post cholecystectomy.      No biliary ductal dilatation.      Limited evaluation of the pancreas which is obscured.      MR-THORACIC SPINE-W/O   Final Result      1.  No acute fracture in the thoracic spine.   2.  Mild chronic compression deformity at T11.      MR-LUMBAR SPINE-W/O   Final Result      1.  Acute bilateral sacral alae fractures.   2.  There is edema in the lower posterior paraspinal soft tissue likely representing soft tissue contusion.   3.  There is no lumbar spine fractures.         CT-LSPINE W/O PLUS RECONS   Final Result      No acute fracture or traumatic malalignment.      CT-PELVIS WITH   Final Result      1.  Recent but probably subacute fracture of the left superior pubic ramus near the symphysis. Adjacent and just lateral to this are two complex masslike structures, the largest and most medial of which contains a fluid-filled cavity. These are    consistent with hematomata, possibly intramuscular.      US-EXTREMITY VENOUS LOWER UNILAT LEFT   Final Result      1.  No evidence of Left  lower extremity deep venous thrombosis.   2.  Two complex areas within the left groin, consistent with hematomata.            INTERPRETING LOCATION: [address] [facility], [place], [address]           Assessment/Plan  * Pubic ramus fracture (HCC)  Assessment & Plan  S/p mechanical GLF  MS Continue 30mg BID + 15mg in between continue with oxycodone for breakthrough  PRN IV morphine, overall doing well on just orals.  PT/OT when able    Osteomyelitis (HCC)  Assessment & Plan  MRI femus shows Subacute fracture involving the left pubic bone with osteomyelitis and probable intraosseous abscess. Extensive edema and enlargement of the muscles of the medial compartment, pectineus muscle, and pubococcygeus muscle is consistent  with known intramuscular abscesses. Fluid collection is most extensive in the adductor longus muscle.  ID following  Resume IV unasyn for now   S/p I&D and bone biopsy 10/9      Candida rash of groin  Assessment & Plan  Slowly improving.   Resume Diflucan     Sepsis (HCC)  Assessment & Plan  This is Sepsis Not present on admission  SIRS criteria identified on my evaluation include: Tachycardia, with heart rate greater than 90 BPM and Leukocyosis, with WBC greater than 12,000  Source is left thigh abscess  Leukocytosis resolved  CTM    Abscess of left thigh  Assessment & Plan  Multiple abscesses found on imaging CT/MRI  Cultures +MSSA showing GNR, GPC  Resume IV unasyn for now per ID    Hematoma of left thigh  Assessment & Plan  2/2 to GLF  S/o I&D  Resume IV abx as above  Improving overall clinically    Bilateral leg edema  Assessment & Plan  Stable,   Albumin ~ 2.5  Encouraged boot TID    Iron deficiency anemia, unspecified iron deficiency anemia type- (present on admission)  Assessment & Plan  Stable, last admit he received IV iron.  No signs of gross bleeding.   Resume nutritional support, oral iron.       Essential hypertension- (present on admission)  Assessment & Plan  Well Controlled   Resume Coreg, Losartan and lasix.     Chronic pain of right knee- (present on admission)  Assessment & Plan  Chronically on MS contin plus short acting oxycodone for breakthrough pain  Resume IV prn ,   Current combination appears to be working per patient     Rheumatoid arthritis (HCC)- (present on admission)  Assessment & Plan  Continue Humira as outpatient once infection has resolved.  Unfortunately at the time of acute infection, further immunosuppression would be contraindicated.      The total time spent face to face with this patient was about 33 mins of which 60% of time was spent on counseling, review of records including pertinent lab data and studies, as well as discussing diagnostic evaluation and work up, planned  therapeutic interventions and future disposition of care. Where indicated, the assessment and plan reflect discussion of patient with consultants, other healthcare providers, family members, and additional research needed to obtain further information in formulating the plan of care of this patient.       VTE prophylaxis: lovenox

## 2019-10-11 NOTE — PROGRESS NOTES
Assumed patient care at approximately 0700 Pt. Is A+Ox4, states his pain is a 6 out of 10 and states he has numbness and tingling in his right hand. Surgical dressing CDI with scant drainage observed in BRAXTON drain. CMS is intact. Pt has redness and flaking rash covering his chest, abdomen, sacrum, bilateral thighs, and bilateral feet. Pt is lying in bed with call light in reach, bed is locked and in the lowest position, fall precautions are in place. Will continue to monitor this patient.

## 2019-10-11 NOTE — CARE PLAN
Problem: Safety  Goal: Will remain free from injury  Outcome: PROGRESSING AS EXPECTED  Note:   The patient is lying in bed comfortably, the bed is locked and in the lowest position. The call light is within reach and the patient has been educated on using the call light when he needs assistance.      Problem: Infection  Goal: Will remain free from infection  Outcome: PROGRESSING AS EXPECTED  Note:   The patient is continuing with his prescribed regimen of antibiotics. The dressing on the patient's surgical incision is clean dry and intact and there is scant drainage coming from the BRAXTON drain. Will continue to monitor this patient.

## 2019-10-12 LAB
ALBUMIN SERPL BCP-MCNC: 2.7 G/DL (ref 3.2–4.9)
ALBUMIN/GLOB SERPL: 0.7 G/DL
ALP SERPL-CCNC: 153 U/L (ref 30–99)
ALT SERPL-CCNC: 26 U/L (ref 2–50)
ANION GAP SERPL CALC-SCNC: 10 MMOL/L (ref 0–11.9)
AST SERPL-CCNC: 14 U/L (ref 12–45)
BASOPHILS # BLD AUTO: 0.6 % (ref 0–1.8)
BASOPHILS # BLD: 0.04 K/UL (ref 0–0.12)
BILIRUB SERPL-MCNC: 0.2 MG/DL (ref 0.1–1.5)
BUN SERPL-MCNC: 13 MG/DL (ref 8–22)
CALCIUM SERPL-MCNC: 8.5 MG/DL (ref 8.4–10.2)
CHLORIDE SERPL-SCNC: 97 MMOL/L (ref 96–112)
CO2 SERPL-SCNC: 27 MMOL/L (ref 20–33)
CREAT SERPL-MCNC: 0.58 MG/DL (ref 0.5–1.4)
EOSINOPHIL # BLD AUTO: 0.15 K/UL (ref 0–0.51)
EOSINOPHIL NFR BLD: 2.4 % (ref 0–6.9)
ERYTHROCYTE [DISTWIDTH] IN BLOOD BY AUTOMATED COUNT: 65.3 FL (ref 35.9–50)
GLOBULIN SER CALC-MCNC: 3.8 G/DL (ref 1.9–3.5)
GLUCOSE SERPL-MCNC: 99 MG/DL (ref 65–99)
HCT VFR BLD AUTO: 27.1 % (ref 42–52)
HGB BLD-MCNC: 8.3 G/DL (ref 14–18)
IMM GRANULOCYTES # BLD AUTO: 0.12 K/UL (ref 0–0.11)
IMM GRANULOCYTES NFR BLD AUTO: 1.9 % (ref 0–0.9)
LACTATE BLD-SCNC: 1.4 MMOL/L (ref 0.5–2)
LYMPHOCYTES # BLD AUTO: 1.59 K/UL (ref 1–4.8)
LYMPHOCYTES NFR BLD: 25.8 % (ref 22–41)
MCH RBC QN AUTO: 27.1 PG (ref 27–33)
MCHC RBC AUTO-ENTMCNC: 30.6 G/DL (ref 33.7–35.3)
MCV RBC AUTO: 88.6 FL (ref 81.4–97.8)
MONOCYTES # BLD AUTO: 0.8 K/UL (ref 0–0.85)
MONOCYTES NFR BLD AUTO: 13 % (ref 0–13.4)
NEUTROPHILS # BLD AUTO: 3.46 K/UL (ref 1.82–7.42)
NEUTROPHILS NFR BLD: 56.3 % (ref 44–72)
NRBC # BLD AUTO: 0 K/UL
NRBC BLD-RTO: 0 /100 WBC
PLATELET # BLD AUTO: 338 K/UL (ref 164–446)
PMV BLD AUTO: 8.6 FL (ref 9–12.9)
POTASSIUM SERPL-SCNC: 4.1 MMOL/L (ref 3.6–5.5)
PROT SERPL-MCNC: 6.5 G/DL (ref 6–8.2)
RBC # BLD AUTO: 3.06 M/UL (ref 4.7–6.1)
SODIUM SERPL-SCNC: 134 MMOL/L (ref 135–145)
WBC # BLD AUTO: 6.2 K/UL (ref 4.8–10.8)

## 2019-10-12 PROCEDURE — 99233 SBSQ HOSP IP/OBS HIGH 50: CPT | Performed by: HOSPITALIST

## 2019-10-12 PROCEDURE — 700102 HCHG RX REV CODE 250 W/ 637 OVERRIDE(OP): Performed by: HOSPITALIST

## 2019-10-12 PROCEDURE — 700105 HCHG RX REV CODE 258: Performed by: INTERNAL MEDICINE

## 2019-10-12 PROCEDURE — A9270 NON-COVERED ITEM OR SERVICE: HCPCS | Performed by: HOSPITALIST

## 2019-10-12 PROCEDURE — 80053 COMPREHEN METABOLIC PANEL: CPT

## 2019-10-12 PROCEDURE — A9270 NON-COVERED ITEM OR SERVICE: HCPCS | Performed by: INTERNAL MEDICINE

## 2019-10-12 PROCEDURE — 700111 HCHG RX REV CODE 636 W/ 250 OVERRIDE (IP): Performed by: INTERNAL MEDICINE

## 2019-10-12 PROCEDURE — 700111 HCHG RX REV CODE 636 W/ 250 OVERRIDE (IP): Performed by: HOSPITALIST

## 2019-10-12 PROCEDURE — 83605 ASSAY OF LACTIC ACID: CPT

## 2019-10-12 PROCEDURE — 770001 HCHG ROOM/CARE - MED/SURG/GYN PRIV*

## 2019-10-12 PROCEDURE — 700102 HCHG RX REV CODE 250 W/ 637 OVERRIDE(OP): Performed by: INTERNAL MEDICINE

## 2019-10-12 PROCEDURE — 99232 SBSQ HOSP IP/OBS MODERATE 35: CPT | Performed by: INTERNAL MEDICINE

## 2019-10-12 PROCEDURE — 85025 COMPLETE CBC W/AUTO DIFF WBC: CPT

## 2019-10-12 RX ORDER — SIMETHICONE 80 MG
80 TABLET,CHEWABLE ORAL 3 TIMES DAILY PRN
Status: DISCONTINUED | OUTPATIENT
Start: 2019-10-12 | End: 2019-10-17 | Stop reason: HOSPADM

## 2019-10-12 RX ADMIN — VITAMIN D, TAB 1000IU (100/BT) 1000 UNITS: 25 TAB at 05:36

## 2019-10-12 RX ADMIN — LOSARTAN POTASSIUM 25 MG: 25 TABLET, FILM COATED ORAL at 05:37

## 2019-10-12 RX ADMIN — MORPHINE SULFATE 4 MG: 4 INJECTION INTRAVENOUS at 00:12

## 2019-10-12 RX ADMIN — CALCIUM CITRATE 200 MG (950 MG) TABLET 950 MG: at 05:36

## 2019-10-12 RX ADMIN — MICONAZOLE NITRATE: 20 CREAM TOPICAL at 05:40

## 2019-10-12 RX ADMIN — OXYCODONE HYDROCHLORIDE 10 MG: 10 TABLET ORAL at 03:03

## 2019-10-12 RX ADMIN — OXYCODONE HYDROCHLORIDE 10 MG: 10 TABLET ORAL at 13:39

## 2019-10-12 RX ADMIN — MAGNESIUM HYDROXIDE 30 ML: 400 SUSPENSION ORAL at 08:37

## 2019-10-12 RX ADMIN — ENOXAPARIN SODIUM 40 MG: 100 INJECTION SUBCUTANEOUS at 05:38

## 2019-10-12 RX ADMIN — NICOTINE 14 MG: 14 PATCH, EXTENDED RELEASE TRANSDERMAL at 05:39

## 2019-10-12 RX ADMIN — OMEPRAZOLE 40 MG: 20 CAPSULE, DELAYED RELEASE ORAL at 17:55

## 2019-10-12 RX ADMIN — OXYCODONE HYDROCHLORIDE 10 MG: 10 TABLET ORAL at 17:55

## 2019-10-12 RX ADMIN — CEFAZOLIN 2 G: 10 INJECTION, POWDER, FOR SOLUTION INTRAVENOUS at 15:29

## 2019-10-12 RX ADMIN — FERROUS SULFATE TAB 325 MG (65 MG ELEMENTAL FE) 325 MG: 325 (65 FE) TAB at 05:36

## 2019-10-12 RX ADMIN — FUROSEMIDE 20 MG: 40 TABLET ORAL at 05:36

## 2019-10-12 RX ADMIN — MORPHINE SULFATE 4 MG: 4 INJECTION INTRAVENOUS at 11:19

## 2019-10-12 RX ADMIN — OXYCODONE HYDROCHLORIDE 10 MG: 10 TABLET ORAL at 08:33

## 2019-10-12 RX ADMIN — AMPICILLIN AND SULBACTAM 3 G: 1; 2 INJECTION, POWDER, FOR SOLUTION INTRAMUSCULAR; INTRAVENOUS at 05:37

## 2019-10-12 RX ADMIN — SENNOSIDES, DOCUSATE SODIUM 2 TABLET: 50; 8.6 TABLET, FILM COATED ORAL at 17:55

## 2019-10-12 RX ADMIN — OMEPRAZOLE 40 MG: 20 CAPSULE, DELAYED RELEASE ORAL at 05:37

## 2019-10-12 RX ADMIN — AMPICILLIN AND SULBACTAM 3 G: 1; 2 INJECTION, POWDER, FOR SOLUTION INTRAMUSCULAR; INTRAVENOUS at 00:04

## 2019-10-12 RX ADMIN — PREGABALIN 25 MG: 25 CAPSULE ORAL at 05:36

## 2019-10-12 RX ADMIN — CEFAZOLIN 2 G: 10 INJECTION, POWDER, FOR SOLUTION INTRAVENOUS at 22:00

## 2019-10-12 RX ADMIN — CEFAZOLIN 2 G: 10 INJECTION, POWDER, FOR SOLUTION INTRAVENOUS at 08:54

## 2019-10-12 RX ADMIN — MICONAZOLE NITRATE: 20 CREAM TOPICAL at 17:56

## 2019-10-12 RX ADMIN — MORPHINE SULFATE 4 MG: 4 INJECTION INTRAVENOUS at 15:16

## 2019-10-12 RX ADMIN — OXYCODONE HYDROCHLORIDE 10 MG: 10 TABLET ORAL at 22:24

## 2019-10-12 RX ADMIN — PREGABALIN 25 MG: 25 CAPSULE ORAL at 11:19

## 2019-10-12 RX ADMIN — SENNOSIDES, DOCUSATE SODIUM 2 TABLET: 50; 8.6 TABLET, FILM COATED ORAL at 05:36

## 2019-10-12 RX ADMIN — BISACODYL 10 MG: 10 SUPPOSITORY RECTAL at 15:29

## 2019-10-12 RX ADMIN — MORPHINE SULFATE 30 MG: 30 TABLET, EXTENDED RELEASE ORAL at 05:37

## 2019-10-12 RX ADMIN — PREGABALIN 25 MG: 25 CAPSULE ORAL at 17:56

## 2019-10-12 RX ADMIN — MORPHINE SULFATE 15 MG: 15 TABLET, EXTENDED RELEASE ORAL at 11:20

## 2019-10-12 RX ADMIN — ACETAMINOPHEN 650 MG: 325 TABLET, FILM COATED ORAL at 05:48

## 2019-10-12 RX ADMIN — MORPHINE SULFATE 30 MG: 30 TABLET, EXTENDED RELEASE ORAL at 17:55

## 2019-10-12 ASSESSMENT — ENCOUNTER SYMPTOMS
CONSTIPATION: 0
NERVOUS/ANXIOUS: 0
SORE THROAT: 0
MEMORY LOSS: 0
SHORTNESS OF BREATH: 0
TREMORS: 0
DIZZINESS: 0
SENSORY CHANGE: 0
VOMITING: 0
CLAUDICATION: 0
CHILLS: 0
DEPRESSION: 0
BACK PAIN: 0
ABDOMINAL PAIN: 1
NAUSEA: 0
DOUBLE VISION: 0
SPUTUM PRODUCTION: 0
PHOTOPHOBIA: 0
FEVER: 0
WEAKNESS: 0
SPEECH CHANGE: 0
ABDOMINAL PAIN: 0
MYALGIAS: 1
NECK PAIN: 0
ORTHOPNEA: 0
EYE PAIN: 0
STRIDOR: 0
COUGH: 0
TINGLING: 0
HEARTBURN: 0
CONSTIPATION: 1
HEADACHES: 0
BLURRED VISION: 0
HEMOPTYSIS: 0
DIARRHEA: 0
PALPITATIONS: 0
BLOOD IN STOOL: 0
PND: 0
ROS SKIN COMMENTS: STABLE

## 2019-10-12 ASSESSMENT — PAIN SCALES - WONG BAKER: WONGBAKER_NUMERICALRESPONSE: HURTS JUST A LITTLE BIT

## 2019-10-12 NOTE — CARE PLAN
Problem: Safety  Goal: Will remain free from injury  Outcome: PROGRESSING SLOWER THAN EXPECTED     Problem: Pain Management  Goal: Pain level will decrease to patient's comfort goal  Outcome: PROGRESSING SLOWER THAN EXPECTED

## 2019-10-12 NOTE — PROGRESS NOTES
-[phone]- Paged Dr. Mims office     [phone]- Repaged Dr. Mims office, no response, notified nightshift RN to follow-up about BRAXTON drain placement.

## 2019-10-12 NOTE — PROGRESS NOTES
Infectious Disease Progress Note    Author: Romie Galdamez M.D. Date & Time of service: 10/12/2019  8:23 AM    Chief Complaint:  Follow-up for left thigh abscess/pelvic OM    Interval History:  44 y.o. man with a history of rheumatoid arthritis on Humira, chronic right knee pain with plans for total right knee arthroplasty, and recent fall sustained 1 month prior to admission admitted for worsening left thigh/groin pain.  Patient found to have multiple fluid collections on imaging which were concerning for an abscess.  He underwent CT-guided drainage on 10/2 with cultures growing MSSA.    10/6 afebrile WBC 12.7 ongoing left groin pain however the crampy pain is somewhat decreased and he is able to ambulate better.  Plan for MRI of the pelvis and femur today  10/7 afebrile WBC 11.4 patient is very anxious after hearing about results of MRI.  MRI of the pelvis reveals osteomyelitis.  Awaiting surgical evaluation for possible surgical debridement.  Patient states left thigh pain remains unchanged and requiring multiple pain medications.  Pain worse with standing  10/8 afebrile WBC 8 patient is agreeable to surgery.  Repeat CT scan shows interval decrease and left thigh abscess.  Minimal output in drain.  Waiting for PICC line  10/9 AF WBC 6.6 plan for surgery today, also has flare up of rash on chest associated with itching  10/10 afebrile WBC 6.4 patient is complaining of significant postop pain.  He underwent debridement yesterday.  He is also complaining of constipation and has not had any bowel movement since Monday.  Wife at the bedside states that surgeon mentioned bone looked infected  10/11 T max 99.1. White count 6.7. Tolerating antibiotics. Pain at surgical site   10/12 afebrile, white count 6.2.  Tolerating antibiotics.  Pathology consistent with osteomyelitis.  OR cultures no growth till date.  Trying to wean off IV pain meds so he can go to rehab.    Labs Reviewed.    Review of Systems:  Review of  Systems   Constitutional: Negative for chills and fever.   Respiratory: Negative for cough and shortness of breath.    Gastrointestinal: Positive for abdominal pain and constipation. Negative for diarrhea, nausea and vomiting.   Musculoskeletal: Positive for myalgias.   Skin: Positive for rash. Negative for itching.        Stable   Neurological: Negative for dizziness and headaches.   All other systems reviewed and are negative.      Hemodynamics:  Temp (24hrs), Av °C (98.6 °F), Min:36.9 °C (98.4 °F), Max:37 °C (98.6 °F)  Temperature: 37 °C (98.6 °F)  Pulse  Av.2  Min: 50  Max: 105   Blood Pressure: 110/66       Physical Exam:  Physical Exam   Constitutional: He is oriented to person, place, and time. He appears well-developed and well-nourished. No distress.   Pallor  Looks tired   HENT:   Mouth/Throat: No oropharyngeal exudate.   Eyes: Pupils are equal, round, and reactive to light. Conjunctivae and EOM are normal.   Neck: Neck supple.   Cardiovascular: Normal rate, regular rhythm and normal heart sounds.   Pulmonary/Chest: Effort normal and breath sounds normal.   Abdominal: Soft. He exhibits no distension.   Intertrigo in groin skin folds  Lower midline abdomen horizontal incision dressing is clean and dry   Musculoskeletal: He exhibits tenderness. He exhibits no edema.   Left groin/thigh drain  + Tenderness to palpation in left groin.  No edema  LUE PICC line - nontender, no surrounding erythema   Neurological: He is alert and oriented to person, place, and time. No cranial nerve deficit.   Skin: Skin is warm and dry. Rash noted. He is not diaphoretic.   Light brown macular rash on chest with some scaling with pressure, some coalescing into patches, non-pruritic  B/L soles with thick flaky skin  Scaling of skin over knuckles B/L hands   Psychiatric: He has a normal mood and affect. His behavior is normal.   Pleasant     Nursing note and vitals reviewed.      Meds:    Current Facility-Administered  Medications:   •  ceFAZolin  •  morphine ER  •  oxyCODONE immediate-release  •  enoxaparin  •  miconazole  •  vitamin D  •  calcium citrate  •  morphine ER  •  ferrous sulfate  •  furosemide  •  losartan  •  omeprazole  •  pregabalin  •  senna-docusate **AND** polyethylene glycol/lytes **AND** magnesium hydroxide **AND** bisacodyl  •  acetaminophen  •  nicotine **AND** Nicotine Replacement Patient Education Materials **AND** nicotine polacrilex  •  morphine injection    Labs:  Recent Labs     10/10/19  0405 10/11/19  0308 10/12/19  0545   WBC 6.4 6.7 6.2   RBC 3.25* 3.27* 3.06*   HEMOGLOBIN 8.6* 8.6* 8.3*   HEMATOCRIT 28.6* 28.5* 27.1*   MCV 88.0 87.2 88.6   MCH 26.5* 26.3* 27.1   RDW 66.9* 65.2* 65.3*   PLATELETCT 336 342 338   MPV 8.5* 8.1* 8.6*   NEUTSPOLYS 64.60 58.80 56.30   LYMPHOCYTES 21.20* 25.10 25.80   MONOCYTES 9.60 12.40 13.00   EOSINOPHILS 1.40 1.50 2.40   BASOPHILS 0.80 0.70 0.60   RBCMORPHOLO  --  Present  --      Recent Labs     10/10/19  0405 10/11/19  0528 10/12/19  0545   SODIUM 139 137 134*   POTASSIUM 4.3 4.1 4.1   CHLORIDE 101 100 97   CO2 27 27 27   GLUCOSE 109* 100* 99   BUN 10 11 13     Recent Labs     10/10/19  0405 10/11/19  0528 10/12/19  0545   ALBUMIN 2.5*  --  2.7*   TBILIRUBIN 0.2  --  0.2   ALKPHOSPHAT 233*  --  153*   TOTPROTEIN 6.2  --  6.5   ALTSGPT 69*  --  26   ASTSGOT 70*  --  14   CREATININE 0.67 0.55 0.58       Imaging:  Ct-drain-hematoma - Seroma    Result Date: 10/2/2019  10/2/2019 12:14 PM HISTORY/REASON FOR EXAM:  Left inguinal fluid collection. Moderate sedation was administered with continuous monitoring of the patient under the direct supervision of  Total sedation time: 30 minutes The biopsy/drainage was done utilizing low dose optimization CT techniques including auto modulation for  imaging and low mA CT/fluoroscopy mode for the procedure. PROCEDURE: After the informed consent the patient was placed on the CT table on supine position. The skin over  the left groin was prepped. Localization CT scan demonstrates fluid collection in the left obturator externus and pectineus muscles. Subsequently after injecting lidocaine needle was advanced into the larger fluid collection. Pus was aspirated. Subsequently a wire was introduced. After dilating the tract an 8 Bulgarian drain was introduced and placed with its loop in the fluid collection. An approximately 100 mL of fluid was aspirated. Subsequently under the needle was advanced into the superficial/pectineus muscle fluid collection. An approximately 10 mL of fluid was aspirated. No drain was placed in the superficial fluid collection. A sample of the material was sent to the microbe allergy. The patient tolerated the procedure without any immediate complication.     1.  Left inguinal region muscular abscess. 2.  CT-guided drainage and catheter placement of large collection in the left obturator externus. 3.  CT-guided aspiration of smaller collection in the left pectineus muscle.    Ct-lspine W/o Plus Recons    Result Date: 9/28/2019 9/28/2019 4:58 AM HISTORY/REASON FOR EXAM: Back pain after recent fall TECHNIQUE/EXAM DESCRIPTION: CT lumbar spine without contrast, with reconstructions. Noncontrast helical scanning was obtained from T12 through the sacrum.  Sagittal reconstructions were generated from the axial images. Low dose optimization technique was utilized for this CT exam including automated exposure control and adjustment of the mA and/or kV according to patient size. COMPARISON:  None. FINDINGS: The bones are markedly osteopenic. There is a minimal compression deformity of the superior endplate of the L2 body, which is old. There is no evidence of acute fracture. There is multilevel interspace and facet arthropathy.     No acute fracture or traumatic malalignment.    Ct-pelvis With    Result Date: 9/28/2019 9/28/2019 3:21 AM HISTORY/REASON FOR EXAM:  significant left groin pain, possible hematoma on us.  TECHNIQUE/EXAM DESCRIPTION AND NUMBER OF VIEWS: CT scan of the pelvis with contrast. Contrast-enhanced helical scanning of the pelvis was obtained from the iliac crests through the pubic symphysis following the bolus administration of 100 mL of Omnipaque 350 nonionic contrast without complication. Low dose optimization technique was utilized for this CT exam including automated exposure control and adjustment of the mA and/or kV according to patient size. COMPARISON:  None. FINDINGS: In the deep left inguinal region, there is a masslike structure containing irregular fluid-filled cavity, which measures overall about 8.6 cm in longest dimension. Within the cavity or focal areas of increased attenuation consistent with calcification. This lesion is immediately adjacent to a recent no probably subacute fracture of the left superior pubic ramus near the symphysis. Lateral to this lesion is a smaller complex mass measuring about 7.3 cm maximum dimension. There is no abnormality within the visualized pelvic cavity and there is no free intraperitoneal fluid. The bladder is intact.     1.  Recent but probably subacute fracture of the left superior pubic ramus near the symphysis. Adjacent and just lateral to this are two complex masslike structures, the largest and most medial of which contains a fluid-filled cavity. These are consistent with hematomata, possibly intramuscular.    Dx-lumbar Spine-2 Or 3 Views    Result Date: 9/16/2019 9/16/2019 4:15 PM HISTORY/REASON FOR EXAM:  Atraumatic Pain TECHNIQUE/ EXAM DESCRIPTION AND NUMBER OF VIEWS:  3 views of the lumbar spine. COMPARISON: None. FINDINGS: Dextroconvex lumbar curvature. Evaluation of the sacrum is limited due to overlying bowel content. No acute fracture. No malalignment. No compression deformity. Mild to moderate degenerative change of the lumbar spine.     Dextroconvex lumbar curvature. No acute osseous abnormality. No malalignment.    Dx-thoracic Spine-with  Swimmers View    Result Date: 9/16/2019 9/16/2019 4:15 PM HISTORY/REASON FOR EXAM:  Atraumatic Pain; ?scoliotic MId back pain and low back pain after GLF x 2 weeks TECHNIQUE/EXAM DESCRIPTION AND NUMBER OF VIEWS:  Thoracic spine, 3 views. COMPARISON:  None. FINDINGS: Diffuse osseous demineralization, limiting evaluation for nondisplaced fracture. No acute fracture. No malalignment. Mild chronic height loss of the mid thoracic spine from T9 to T11. Mild degenerative change of the thoracic spine.     Mild chronic height loss of the mid thoracic spine from T9 to T11.    Mr-lumbar Spine-w/o    Result Date: 9/28/2019 9/28/2019 6:21 PM HISTORY/REASON FOR EXAM:  back pain. TECHNIQUE/EXAM DESCRIPTION: MRI of the lumbar spine without contrast. The study was performed on a [employer] 1.5 Mae MRI scanner. T1 sagittal, T2 fast spin-echo sagittal, and T2 axial images were obtained of the lumbar spine. COMPARISON:  None. FINDINGS: There are bilateral sacral alar fractures. The lumbar spine maintains normal height and alignment. At the level of L5-S1, there is no spinal or neural foraminal stenosis. At the level of L4-5, there is no spinal or neural foraminal stenosis. At the level of L3-4, there is no spinal or neural foraminal stenosis. At the level of the L2-3, there is no spinal or neural foraminal stenosis. At the level of L1-2, there is no spinal or neural foraminal stenosis. The conus terminates at the level of L1. The visualized lower thoracic spinal cord appear normal. There is edema in the posterior paraspinal soft tissue likely representing soft tissue contusion.     1.  Acute bilateral sacral alae fractures. 2.  There is edema in the lower posterior paraspinal soft tissue likely representing soft tissue contusion. 3.  There is no lumbar spine fractures.     Mr-thoracic Spine-w/o    Result Date: 9/28/2019 9/28/2019 6:21 PM HISTORY/REASON FOR EXAM:  back pain TECHNIQUE/EXAM DESCRIPTION: MRI of the thoracic spine  without contrast. The study was performed on a [employer] 1.5 Mae MRI scanner. T1 sagittal, T2 fast spin-echo sagittal, and T2 axial images were obtained of the thoracic spine. COMPARISON: None. FINDINGS: There is mild chronic compression deformity at T11. There is no fracture or dislocation. There is no pathologic marrow infiltration. There is no intradural extramedullary lesion. There is no abnormal intramedullary T2 signal intensity in the thoracic spinal cord.     1.  No acute fracture in the thoracic spine. 2.  Mild chronic compression deformity at T11.    Us-extremity Venous Lower Unilat Left    Result Date: 9/28/2019  HISTORY/REASON FOR EXAM:  Leg swelling after fall TECHNIQUE/EXAM DESCRIPTION: Left lower extremity doppler venous ultrasound was performed. Using both color and pulse-wave Doppler imaging, multiple images were obtained from the common femoral vein origins distally through the popliteal trifurcations.  9/28/2019 12:54 AM COMPARISON:  None. FINDINGS: REAL-TIME GRAY-SCALE IMAGING: Real-time gray-scale imaging reveals no evidence of focal wall thickening. COLOR AND DUPLEX DOPPLER IMAGING: Graded compression was used to demonstrate patent lumens.  There is no evidence for luminal thrombus.  There is normal response to augmentation and respiratory variation. There are two rounded complex areas in the left groin region, the largest measuring about 10.9 cm diameter and the smaller, about 8.3 cm in diameter, both of which are consistent with hematomata.     1.  No evidence of Left  lower extremity deep venous thrombosis. 2.  Two complex areas within the left groin, consistent with hematomata. INTERPRETING LOCATION: [address] [place] [address], [id number]    Us-ruq    Result Date: 10/1/2019  10/1/2019 5:49 PM HISTORY/REASON FOR EXAM:  Edema Abdominal pain TECHNIQUE/EXAM DESCRIPTION AND NUMBER OF VIEWS:  Real-time sonography of the liver and biliary tree. COMPARISON: None FINDINGS: The liver is normal in contour.  There is no evidence of solid mass lesion. The liver measures 20.15 cm. The gallbladder is surgically absent. The common duct measures 0.44 cm. The pancreas is obscured by bowel gas. The visualized aorta is normal in caliber. Intrahepatic IVC is patent. The portal vein is patent with hepatopetal flow. The MPV measures 1.57 cm. The right kidney measures 11.06 cm. There is no hydronephrosis. There is no ascites.     Hepatomegaly. Status post cholecystectomy. Dilated portal vein. Status post cholecystectomy. No biliary ductal dilatation. Limited evaluation of the pancreas which is obscured.      Micro:  See below    Assessment:  Active Hospital Problems    Diagnosis       •    • Rheumatoid arthritis (HCC) [M06.9]   • Immunosuppressed state    Pityriasis versicolor   • Abscess of left thigh [L02.416]    Pelvic osteomyelitis    MSSA infection       Plan:  Osteomyelitis of the left pubic bone(noted on MRI)  Status post recent fall found to have subacute fracture on presentation  ESR - 55  Anticipate 6 weeks of IV antibiotics with a stop date of 11/19/2019  Continue antibiotics per below  Status post I&D on 10/9/2019 by Dr. Olivares. Thickened periosteum and soft bone was encountered intra-op  OR pathology consistent with osteomyelitis, cultures no growth to date    Abscess of left thigh, improved  No fevers  Persistent leukocytosis, now resolved  Sustained recent fall 1 month prior to admission  Blood cultures-negative to date  Images reviewed. Initial CT with 2 complex abscesses measuring 8.6cm and 7.2cm respectively in the longest dimension  Status post CT-guided drainage on 10/2  Repeat CT on 10/7-interval decrease in the left pelvis and thigh intramuscular abscess - difficult to measure given complex nature  Cultures+ MSSA.  Gram stain after discussion with micro lab was corrected to GPCs alone  Will change IV Unasyn to IV Ancef 2 g every 8 hours  Stop date 11/19/2019 as above  Weekly CBC with differential, CMP while  on IV antibiotics  Further surgical management performed on 10/9 as above    Pityriasis versicolor, intertrigo  Light brown macular rash on chest and trunk with some scaling with pressure, some coalescing into patches, non-pruritic  Has been ongoing for about 2 weeks, similar presentation a month ago  Received 10 days of fluconazole  Patient is on miconazole cream for intertrigo. Recommend application to chest as well -  started 10/11. Will require 2-4 weeks of treatment    Rheumatoid arthritis  On Humira prior to admission  Chronically immunosuppressed    Chronic right knee pain  Plan for future total right knee arthroplasty    Recent fall  Approximately 1 month prior to admission  Imaging on presentation revealed subacute fracture of the left pubic bone    Disposition: TBD, likely rehab    Plan of care discussed with internal medicine/Dr. Pena     ID will follow.  Please call with questions.

## 2019-10-12 NOTE — PROGRESS NOTES
[facility] Medicine Daily Progress Note    Date of Service  10/12/2019    Chief Complaint  44 y.o. male admitted 9/27/2019 with left thigh pain    Hospital Course    44 y.o. male who presented to the emergency department on 9/27/2019 for evaluation of worsening left leg pain.  Patient had a fall about a month ago.  Patient has a problem with his right knee, on schedule for total knee replacement and ever since walking with a cane or walker. He had increasing pain in the left thigh and CT scan showed a left pubic ramus fracture with two large hematomas. Orthopedic surgery did see the patient and recommends no surgical intervention.  The patient had a rising white cell count and increasing pain so hematoma drainage was done by interventional radiology and tiffanie pus was drained consistent with an abscess. Cultures grew MSSA and drain was left in place.      Interval Problem Update  No acute issues overnight, patient status he still has 5 out of 10 pain in his left thigh area, however pain is well controlled with his current regimen.  Patient is followed by ID, continue IV antibiotics  Waiting for surgical intervention by orthopedics    10/9  Patient says he feels the same, pain is better controlled but still has bouts of pain 5-7/10 left thigh/groin area.   Awaiting for surgery today. Denies itchiness around skin. Just also complaining of rheumatoid arthritis joint discomfort in his right wrist    10/10  Patient is complaining of 8 out of 10 pain located in his left hip and groin area.  He says that the current pain medications that we have intermittently are controlling his symptoms.  Patient underwent a irrigation of debridement of his pelvic wound area, as well as a bone biopsy to rule out osteomyelitis.    10/11  No acute issues overnight, patient says overall his pain has improved, now currently a 5 out of 10 at its peak, and overall in good spirits.    10/12  Overnight patient was having BRAXTON drainage leakage, no  acute issues currently,  Still having 5/10 pain in left thigh area especially during movement, but overall says his pain is better controlled than prior days. Denies fevers/chills   PT/OT today.    Consultants/Specialty  Orthopedic surgery Dr. Olivares  Radiology Dr. Olguin    Code Status  Full Code    Disposition  TBD    Review of Systems  Review of Systems   Constitutional: Positive for malaise/fatigue. Negative for chills and fever.   HENT: Negative for congestion, hearing loss, sore throat and tinnitus.    Eyes: Negative for blurred vision, double vision, photophobia and pain.   Respiratory: Negative for cough, hemoptysis, sputum production, shortness of breath and stridor.    Cardiovascular: Negative for chest pain, palpitations, orthopnea, claudication, leg swelling and PND.   Gastrointestinal: Negative for abdominal pain, blood in stool, constipation, heartburn, melena, nausea and vomiting.   Genitourinary: Negative for dysuria, frequency and urgency.   Musculoskeletal: Positive for joint pain (all his joints from RA) and myalgias (thigh pain, mild improving). Negative for back pain and neck pain.        Left thigh pain   Skin: Positive for rash (near resolved). Negative for itching.        Mild skin peeling over his left thigh area   Neurological: Negative for dizziness, tingling, tremors, sensory change, speech change, weakness and headaches.   Psychiatric/Behavioral: Negative for depression, memory loss and suicidal ideas. The patient is not nervous/anxious.    All other systems reviewed and are negative.       Physical Exam  Temp:  [36.9 °C (98.4 °F)-37 °C (98.6 °F)] 37 °C (98.6 °F)  Pulse:  [] 93  Resp:  [16-18] 18  BP: (105-116)/(66-91) 110/66  SpO2:  [95 %-100 %] 95 %    Physical Exam   Constitutional: He is oriented to person, place, and time. He appears well-developed and well-nourished. No distress.   HENT:   Head: Normocephalic and atraumatic.   Mouth/Throat: No oropharyngeal exudate.    Eyes: Pupils are equal, round, and reactive to light. Conjunctivae and EOM are normal. Right eye exhibits no discharge. No scleral icterus.   Neck: Neck supple. No JVD present. No tracheal deviation present. No thyromegaly present.   Cardiovascular: Normal rate, regular rhythm and intact distal pulses.   No murmur heard.  Pulses:       Dorsalis pedis pulses are 2+ on the right side, and 2+ on the left side.   Cap refill < 3 s   Pulmonary/Chest: Effort normal and breath sounds normal. No stridor. No respiratory distress. He has no wheezes. He has no rales.   Abdominal: Soft. Bowel sounds are normal. He exhibits no distension. There is no tenderness. There is no rebound.   Musculoskeletal: Normal range of motion. He exhibits no edema or tenderness.   Left thigh tenderness has improved, still patient has mildly flaky skin around that area however no erythema or swelling noted, BRAXTON drain loose, minimal output.      Neurological: He is alert and oriented to person, place, and time. No cranial nerve deficit. Coordination normal.   Difficulty bearing weight on left leg due to pain   Skin: Skin is warm and dry. Rash (regressing) noted. He is not diaphoretic. No erythema. No pallor.   Macular rash over trunk fading, improved from yesterday   Psychiatric: He has a normal mood and affect. His behavior is normal. Thought content normal.   Nursing note and vitals reviewed.      Fluids    Intake/Output Summary (Last 24 hours) at 10/12/2019 0643  Last data filed at 10/12/2019 0500  Gross per 24 hour   Intake --   Output 1350 ml   Net -1350 ml       Laboratory  Recent Labs     10/10/19  0405 10/11/19  0308 10/12/19  0545   WBC 6.4 6.7 6.2   RBC 3.25* 3.27* 3.06*   HEMOGLOBIN 8.6* 8.6* 8.3*   HEMATOCRIT 28.6* 28.5* 27.1*   MCV 88.0 87.2 88.6   MCH 26.5* 26.3* 27.1   MCHC 30.1* 30.2* 30.6*   RDW 66.9* 65.2* 65.3*   PLATELETCT 336 342 338   MPV 8.5* 8.1* 8.6*     Recent Labs     10/10/19  0405 10/11/19  0528 10/12/19  0545   SODIUM  139 137 134*   POTASSIUM 4.3 4.1 4.1   CHLORIDE 101 100 97   CO2 27 27 27   GLUCOSE 109* 100* 99   BUN 10 11 13   CREATININE 0.67 0.55 0.58   CALCIUM 8.3* 8.4 8.5                   Imaging  IR-PICC LINE PLACEMENT W/ GUIDANCE > AGE 5   Final Result                  Ultrasound-guided PICC placement performed by qualified nursing staff as    above.          CT-ABDOMEN-PELVIS WITH   Final Result      1.  Interval decrease in the intramuscular abscesses involving the left pelvis and thigh following drain placement. Collection in the pubococcygeus muscle is similar in size to the prior exam.      2.  Ventral hernia containing anterior bowel wall and fat. No secondary obstruction.      US-EXTREMITY NON VASCULAR UNILATERAL RIGHT   Final Result      Loculated collection in the posteromedial left knee likely represents a Baker's cyst. No significant knee effusion is identified in the upper knee joint on recent MRI.      MR-FEMUR-W/O LEFT   Final Result      1.  Limited exam secondary to significant patient motion.   2.  Subacute fracture involving the left pubic bone with osteomyelitis and probable intraosseous abscess.   3.  Extensive edema and enlargement of the muscles of the medial compartment, pectineus muscle, and pubococcygeus muscle is consistent with known intramuscular abscesses. Fluid collection is most extensive in the adductor longus muscle.   4.  Subacute fractures of the sacrum               Comment: Results given to Dr. Varma through [place] text at 3:56 PM      MR-PELVIS W/O   Final Result      1.  Limited exam secondary to significant patient motion.   2.  Subacute fracture involving the left pubic bone with osteomyelitis and probable intraosseous abscess.   3.  Extensive edema and enlargement of the muscles of the medial compartment, pectineus muscle, and pubococcygeus muscle is consistent with known intramuscular abscesses. Fluid collection is most extensive in the adductor longus muscle.   4.  Subacute  fractures of the sacrum               Comment: Results given to Dr. Varma through [place] text at 3:56 PM      CT-DRAIN-HEMATOMA - SEROMA   Final Result      1.  Left inguinal region muscular abscess.   2.  CT-guided drainage and catheter placement of large collection in the left obturator externus.   3.  CT-guided aspiration of smaller collection in the left pectineus muscle.      US-RUQ   Final Result      Hepatomegaly.      Status post cholecystectomy.      Dilated portal vein.      Status post cholecystectomy.      No biliary ductal dilatation.      Limited evaluation of the pancreas which is obscured.      MR-THORACIC SPINE-W/O   Final Result      1.  No acute fracture in the thoracic spine.   2.  Mild chronic compression deformity at T11.      MR-LUMBAR SPINE-W/O   Final Result      1.  Acute bilateral sacral alae fractures.   2.  There is edema in the lower posterior paraspinal soft tissue likely representing soft tissue contusion.   3.  There is no lumbar spine fractures.         CT-LSPINE W/O PLUS RECONS   Final Result      No acute fracture or traumatic malalignment.      CT-PELVIS WITH   Final Result      1.  Recent but probably subacute fracture of the left superior pubic ramus near the symphysis. Adjacent and just lateral to this are two complex masslike structures, the largest and most medial of which contains a fluid-filled cavity. These are    consistent with hematomata, possibly intramuscular.      US-EXTREMITY VENOUS LOWER UNILAT LEFT   Final Result      1.  No evidence of Left  lower extremity deep venous thrombosis.   2.  Two complex areas within the left groin, consistent with hematomata.            INTERPRETING LOCATION: [address] [facility], [place], [address]           Assessment/Plan  * Pubic ramus fracture (HCC)  Assessment & Plan  S/p mechanical GLF  MS Continue 30mg BID + 15mg in between continue with oxycodone for breakthrough, this regimen appears to be working better.   PRN IV morphine, would  recommended weaning IV though.  PT/OT, consideration for rehab pending.    Osteomyelitis ([facility])  Assessment & Plan  MRI femus shows Subacute fracture involving the left pubic bone with osteomyelitis and probable intraosseous abscess. Extensive edema and enlargement of the muscles of the medial compartment, pectineus muscle, and pubococcygeus muscle is consistent with known intramuscular abscesses. Fluid collection is most extensive in the adductor longus muscle.  ID following  Continue IV Unasyn   S/p I&D and bone biopsy 10/9, cultures neg, awaiting for final.       Candida rash of groin  Assessment & Plan  Improving overall,   Resume Diflucan cream.    Sepsis ([facility])  Assessment & Plan  This is Sepsis Not present on admission  SIRS criteria identified on my evaluation include: Tachycardia, with heart rate greater than 90 BPM and Leukocyosis, with WBC greater than 12,000  Source is left thigh abscess  Leukocytosis resolved  CTM    Abscess of left thigh  Assessment & Plan  Multiple abscesses found on imaging CT/MRI  Cultures +MSSA showing GNR, GPC  Continue with IV unasyn  As above ID following.     Hematoma of left thigh  Assessment & Plan  2/2 to GLF  S/o I&D  Resume IV abx as above  Improving overall clinically    Bilateral leg edema  Assessment & Plan  Stable,   Albumin ~ 2.7  Encouraged boot TID    Iron deficiency anemia, unspecified iron deficiency anemia type- (present on admission)  Assessment & Plan  Stable, last admit he received IV iron.  No signs of gross bleeding.   Resume nutritional support, oral iron.     Essential hypertension- (present on admission)  Assessment & Plan  Well Controlled overall.   Resume Coreg, Losartan and lasix.     Chronic pain of right knee- (present on admission)  Assessment & Plan  Chronically on MS contin plus short acting oxycodone for breakthrough pain  As above.   Current combination appears to be working per patient     Rheumatoid arthritis (HCC)- (present on  admission)  Assessment & Plan        Continue Humira as outpatient once infection has resolved.  Unfortunately at the       time of acute infection, further immunosuppression would be contraindicated.      The total time spent face to face with this patient was about 33 mins of which 60% of time was spent on counseling, review of records including pertinent lab data and studies, as well as discussing diagnostic evaluation and work up, planned therapeutic interventions and future disposition of care. Where indicated, the assessment and plan reflect discussion of patient with consultants, other healthcare providers, family members, and additional research needed to obtain further information in formulating the plan of care of this patient.       VTE prophylaxis: lovenox

## 2019-10-12 NOTE — PROGRESS NOTES
Pt A&Ox4. C/o pain in the groin and abd area 7/10. Pain med given per MAR. Surgical dressing clean, dry, intact. CMS intact. BRAXTON drain x1 observed w/ scant drainage. No return call from Dr. Olivares's office yet. Will try to follow up. Pt's has redness to bilat groin. Skin to bilat feet, thighs, flaky and fragile. Safety precautions in place. Pt voided in the urinal. Fresh water and orange juice given per request. No additional needs at this time.

## 2019-10-12 NOTE — PROGRESS NOTES
Assumed patient care at this time. Patient alert and oriented, got a little aggressive with staff about his pain medication order which has a note to be given as needed before PT/OT. The chart reflects patient has been getting this as needed.   Patient reports 6/10 pain. Will medicate per MAR.

## 2019-10-12 NOTE — PROGRESS NOTES
Pt up to restroom, when he came back to bed, he told RN that he felt a pull on his BRAXTON drain. RN to assess site, looks like BRAXTON drain had been pulled out slightly, paged ortho, unable to reach . Per I/O very minimal output coming from BRAXTON drain, pt is not distended. Will notify nightshift RN, charge RN notified.

## 2019-10-13 LAB
ANION GAP SERPL CALC-SCNC: 11 MMOL/L (ref 0–11.9)
BACTERIA TISS AEROBE CULT: ABNORMAL
BACTERIA TISS AEROBE CULT: ABNORMAL
BASOPHILS # BLD AUTO: 1.4 % (ref 0–1.8)
BASOPHILS # BLD: 0.07 K/UL (ref 0–0.12)
BUN SERPL-MCNC: 8 MG/DL (ref 8–22)
CALCIUM SERPL-MCNC: 8.7 MG/DL (ref 8.4–10.2)
CHLORIDE SERPL-SCNC: 101 MMOL/L (ref 96–112)
CO2 SERPL-SCNC: 27 MMOL/L (ref 20–33)
CREAT SERPL-MCNC: 0.56 MG/DL (ref 0.5–1.4)
EOSINOPHIL # BLD AUTO: 0.14 K/UL (ref 0–0.51)
EOSINOPHIL NFR BLD: 2.8 % (ref 0–6.9)
ERYTHROCYTE [DISTWIDTH] IN BLOOD BY AUTOMATED COUNT: 63.7 FL (ref 35.9–50)
GLUCOSE SERPL-MCNC: 92 MG/DL (ref 65–99)
GRAM STN SPEC: ABNORMAL
HCT VFR BLD AUTO: 28 % (ref 42–52)
HGB BLD-MCNC: 8.3 G/DL (ref 14–18)
IMM GRANULOCYTES # BLD AUTO: 0.08 K/UL (ref 0–0.11)
IMM GRANULOCYTES NFR BLD AUTO: 1.6 % (ref 0–0.9)
LYMPHOCYTES # BLD AUTO: 1.6 K/UL (ref 1–4.8)
LYMPHOCYTES NFR BLD: 31.6 % (ref 22–41)
MCH RBC QN AUTO: 26.1 PG (ref 27–33)
MCHC RBC AUTO-ENTMCNC: 29.6 G/DL (ref 33.7–35.3)
MCV RBC AUTO: 88.1 FL (ref 81.4–97.8)
MONOCYTES # BLD AUTO: 0.68 K/UL (ref 0–0.85)
MONOCYTES NFR BLD AUTO: 13.4 % (ref 0–13.4)
NEUTROPHILS # BLD AUTO: 2.49 K/UL (ref 1.82–7.42)
NEUTROPHILS NFR BLD: 49.2 % (ref 44–72)
NRBC # BLD AUTO: 0 K/UL
NRBC BLD-RTO: 0 /100 WBC
PLATELET # BLD AUTO: 356 K/UL (ref 164–446)
PMV BLD AUTO: 8.6 FL (ref 9–12.9)
POTASSIUM SERPL-SCNC: 4.3 MMOL/L (ref 3.6–5.5)
RBC # BLD AUTO: 3.18 M/UL (ref 4.7–6.1)
SIGNIFICANT IND 70042: ABNORMAL
SITE SITE: ABNORMAL
SODIUM SERPL-SCNC: 139 MMOL/L (ref 135–145)
SOURCE SOURCE: ABNORMAL
WBC # BLD AUTO: 5.1 K/UL (ref 4.8–10.8)

## 2019-10-13 PROCEDURE — 700102 HCHG RX REV CODE 250 W/ 637 OVERRIDE(OP): Performed by: HOSPITALIST

## 2019-10-13 PROCEDURE — 770001 HCHG ROOM/CARE - MED/SURG/GYN PRIV*

## 2019-10-13 PROCEDURE — 700111 HCHG RX REV CODE 636 W/ 250 OVERRIDE (IP): Performed by: INTERNAL MEDICINE

## 2019-10-13 PROCEDURE — 700105 HCHG RX REV CODE 258: Performed by: INTERNAL MEDICINE

## 2019-10-13 PROCEDURE — 99233 SBSQ HOSP IP/OBS HIGH 50: CPT | Performed by: HOSPITALIST

## 2019-10-13 PROCEDURE — 700102 HCHG RX REV CODE 250 W/ 637 OVERRIDE(OP): Performed by: INTERNAL MEDICINE

## 2019-10-13 PROCEDURE — A9270 NON-COVERED ITEM OR SERVICE: HCPCS | Performed by: INTERNAL MEDICINE

## 2019-10-13 PROCEDURE — 85025 COMPLETE CBC W/AUTO DIFF WBC: CPT

## 2019-10-13 PROCEDURE — 99232 SBSQ HOSP IP/OBS MODERATE 35: CPT | Performed by: INTERNAL MEDICINE

## 2019-10-13 PROCEDURE — 700111 HCHG RX REV CODE 636 W/ 250 OVERRIDE (IP): Performed by: HOSPITALIST

## 2019-10-13 PROCEDURE — A9270 NON-COVERED ITEM OR SERVICE: HCPCS | Performed by: HOSPITALIST

## 2019-10-13 PROCEDURE — 80048 BASIC METABOLIC PNL TOTAL CA: CPT

## 2019-10-13 RX ORDER — MORPHINE SULFATE 30 MG/1
30 TABLET, FILM COATED, EXTENDED RELEASE ORAL 3 TIMES DAILY
Status: DISCONTINUED | OUTPATIENT
Start: 2019-10-13 | End: 2019-10-16

## 2019-10-13 RX ADMIN — FERROUS SULFATE TAB 325 MG (65 MG ELEMENTAL FE) 325 MG: 325 (65 FE) TAB at 05:12

## 2019-10-13 RX ADMIN — MORPHINE SULFATE 30 MG: 30 TABLET, EXTENDED RELEASE ORAL at 05:12

## 2019-10-13 RX ADMIN — OMEPRAZOLE 40 MG: 20 CAPSULE, DELAYED RELEASE ORAL at 17:32

## 2019-10-13 RX ADMIN — PREGABALIN 25 MG: 25 CAPSULE ORAL at 05:12

## 2019-10-13 RX ADMIN — CEFAZOLIN 2 G: 10 INJECTION, POWDER, FOR SOLUTION INTRAVENOUS at 13:21

## 2019-10-13 RX ADMIN — PREGABALIN 25 MG: 25 CAPSULE ORAL at 12:10

## 2019-10-13 RX ADMIN — CALCIUM CITRATE 200 MG (950 MG) TABLET 950 MG: at 05:13

## 2019-10-13 RX ADMIN — ENOXAPARIN SODIUM 40 MG: 100 INJECTION SUBCUTANEOUS at 05:13

## 2019-10-13 RX ADMIN — FUROSEMIDE 20 MG: 40 TABLET ORAL at 05:12

## 2019-10-13 RX ADMIN — LOSARTAN POTASSIUM 25 MG: 25 TABLET, FILM COATED ORAL at 05:12

## 2019-10-13 RX ADMIN — MORPHINE SULFATE 30 MG: 30 TABLET, EXTENDED RELEASE ORAL at 17:32

## 2019-10-13 RX ADMIN — OXYCODONE HYDROCHLORIDE 10 MG: 10 TABLET ORAL at 20:30

## 2019-10-13 RX ADMIN — FUROSEMIDE 20 MG: 40 TABLET ORAL at 17:32

## 2019-10-13 RX ADMIN — MICONAZOLE NITRATE: 20 CREAM TOPICAL at 17:34

## 2019-10-13 RX ADMIN — CEFAZOLIN 2 G: 10 INJECTION, POWDER, FOR SOLUTION INTRAVENOUS at 06:00

## 2019-10-13 RX ADMIN — OXYCODONE HYDROCHLORIDE 10 MG: 10 TABLET ORAL at 02:24

## 2019-10-13 RX ADMIN — SENNOSIDES, DOCUSATE SODIUM 2 TABLET: 50; 8.6 TABLET, FILM COATED ORAL at 05:12

## 2019-10-13 RX ADMIN — OMEPRAZOLE 40 MG: 20 CAPSULE, DELAYED RELEASE ORAL at 05:12

## 2019-10-13 RX ADMIN — PREGABALIN 25 MG: 25 CAPSULE ORAL at 17:31

## 2019-10-13 RX ADMIN — NICOTINE 14 MG: 14 PATCH, EXTENDED RELEASE TRANSDERMAL at 05:12

## 2019-10-13 RX ADMIN — VITAMIN D, TAB 1000IU (100/BT) 1000 UNITS: 25 TAB at 05:12

## 2019-10-13 RX ADMIN — OXYCODONE HYDROCHLORIDE 10 MG: 10 TABLET ORAL at 08:14

## 2019-10-13 RX ADMIN — MORPHINE SULFATE 30 MG: 30 TABLET, EXTENDED RELEASE ORAL at 12:10

## 2019-10-13 RX ADMIN — CEFAZOLIN 2 G: 10 INJECTION, POWDER, FOR SOLUTION INTRAVENOUS at 21:55

## 2019-10-13 RX ADMIN — OXYCODONE HYDROCHLORIDE 10 MG: 10 TABLET ORAL at 13:21

## 2019-10-13 RX ADMIN — MORPHINE SULFATE 4 MG: 4 INJECTION INTRAVENOUS at 16:50

## 2019-10-13 RX ADMIN — SENNOSIDES, DOCUSATE SODIUM 2 TABLET: 50; 8.6 TABLET, FILM COATED ORAL at 17:31

## 2019-10-13 ASSESSMENT — ENCOUNTER SYMPTOMS
PHOTOPHOBIA: 0
DIZZINESS: 0
EYE PAIN: 0
NERVOUS/ANXIOUS: 0
BLURRED VISION: 0
ROS SKIN COMMENTS: STABLE
PALPITATIONS: 0
WEAKNESS: 0
BLOOD IN STOOL: 0
DOUBLE VISION: 0
MYALGIAS: 1
HEARTBURN: 0
FEVER: 0
DEPRESSION: 0
SHORTNESS OF BREATH: 0
STRIDOR: 0
TREMORS: 0
BACK PAIN: 0
PND: 0
TINGLING: 0
CONSTIPATION: 0
NAUSEA: 0
CHILLS: 0
CLAUDICATION: 0
SPUTUM PRODUCTION: 0
MEMORY LOSS: 0
ORTHOPNEA: 0
HEMOPTYSIS: 0
ABDOMINAL PAIN: 0
SENSORY CHANGE: 0
SORE THROAT: 0
DIARRHEA: 0
CONSTIPATION: 1
HEADACHES: 0
COUGH: 0
VOMITING: 0
SPEECH CHANGE: 0
ABDOMINAL PAIN: 1
NECK PAIN: 0

## 2019-10-13 NOTE — CARE PLAN
Problem: Infection  Goal: Will remain free from infection  Outcome: PROGRESSING AS EXPECTED    Pt is on IV abt, no adverse reaction noted. Pt is afebrile and VSS.      Problem: Pain Management  Goal: Pain level will decrease to patient's comfort goal  Outcome: PROGRESSING AS EXPECTED    Pt is on MS contin 30 mg TID and prn oxycodone as needed for pain.      Problem: Skin Integrity  Goal: Risk for impaired skin integrity will decrease  Outcome: PROGRESSING AS EXPECTED     Encouraged pt to turn and reposition in bed for skin integrity.

## 2019-10-13 NOTE — PROGRESS NOTES
[facility] Medicine Daily Progress Note    Date of Service  10/13/2019    Chief Complaint  44 y.o. male admitted 9/27/2019 with left thigh pain    Hospital Course    44 y.o. male who presented to the emergency department on 9/27/2019 for evaluation of worsening left leg pain.  Patient had a fall about a month ago.  Patient has a problem with his right knee, on schedule for total knee replacement and ever since walking with a cane or walker. He had increasing pain in the left thigh and CT scan showed a left pubic ramus fracture with two large hematomas. Orthopedic surgery did see the patient and recommends no surgical intervention.  The patient had a rising white cell count and increasing pain so hematoma drainage was done by interventional radiology and tiffanie pus was drained consistent with an abscess. Cultures grew MSSA and drain was left in place.      Interval Problem Update    10/12  Overnight patient was having BRAXTON drainage leakage, no acute issues currently,  Still having 5/10 pain in left thigh area especially during movement, but overall says his pain is better controlled than prior days. Denies fevers/chills   PT/OT today.    10/13  Patient overall says pain is manageable, usually 4-6/10 at times, now what bothers him more are his joint due to RA. But says his joint pains are restricting his mobility. Otherwise in good spirits. Patient denies fevers/chills, chest pain, shortness of breath or nausea/vommiting.     Consultants/Specialty  Orthopedic surgery Dr. Olivares  Radiology Dr. Olguin    Code Status  Full Code    Disposition  TBD    Review of Systems  Review of Systems   Constitutional: Negative for chills, fever and malaise/fatigue.   HENT: Negative for congestion, hearing loss, sore throat and tinnitus.    Eyes: Negative for blurred vision, double vision, photophobia and pain.   Respiratory: Negative for cough, hemoptysis, sputum production, shortness of breath and stridor.    Cardiovascular: Negative  for chest pain, palpitations, orthopnea, claudication, leg swelling and PND.   Gastrointestinal: Negative for abdominal pain, blood in stool, constipation, heartburn, melena, nausea and vomiting.   Genitourinary: Negative for dysuria, frequency and urgency.   Musculoskeletal: Positive for joint pain (all his joints from RA, unchanged) and myalgias (generzlied). Negative for back pain and neck pain.        Left thigh pain   Skin: Positive for rash (minima). Negative for itching.        Mild skin peeling over his left thigh area   Neurological: Negative for dizziness, tingling, tremors, sensory change, speech change, weakness and headaches.   Psychiatric/Behavioral: Negative for depression, memory loss and suicidal ideas. The patient is not nervous/anxious.    All other systems reviewed and are negative.       Physical Exam  Temp:  [36.4 °C (97.5 °F)-36.8 °C (98.2 °F)] 36.4 °C (97.5 °F)  Pulse:  [90-95] 95  Resp:  [18] 18  BP: (103-124)/(60-79) 124/79  SpO2:  [96 %-100 %] 97 %    Physical Exam   Constitutional: He is oriented to person, place, and time. He appears well-developed and well-nourished. No distress.   Tired appearing   HENT:   Head: Normocephalic and atraumatic.   Mouth/Throat: No oropharyngeal exudate.   Eyes: Pupils are equal, round, and reactive to light. Conjunctivae and EOM are normal. Right eye exhibits no discharge. No scleral icterus.   Neck: Neck supple. No JVD present. No tracheal deviation present. No thyromegaly present.   Cardiovascular: Normal rate, regular rhythm and intact distal pulses.   No murmur heard.  Pulses:       Dorsalis pedis pulses are 2+ on the right side, and 2+ on the left side.   Cap refill < 3 s   Pulmonary/Chest: Effort normal and breath sounds normal. No stridor. No respiratory distress. He has no wheezes. He has no rales.   Abdominal: Soft. Bowel sounds are normal. He exhibits no distension. There is no tenderness. There is no rebound.   Musculoskeletal: Normal range of  motion. He exhibits tenderness (PIP/DIP joints.). He exhibits no edema.   Left thigh tenderness unchanged. No erythema noted, there is still serous drainage from BRAXTON drain.       Neurological: He is alert and oriented to person, place, and time. No cranial nerve deficit. Coordination normal.   Difficulty bearing weight on left leg due to pain   Skin: Skin is warm and dry. Rash noted. He is not diaphoretic. No erythema. No pallor.   Macular rash over trunk near resolved     Psychiatric: He has a normal mood and affect. His behavior is normal. Thought content normal.   Nursing note and vitals reviewed.      Fluids    Intake/Output Summary (Last 24 hours) at 10/13/2019 0701  Last data filed at 10/13/2019 0500  Gross per 24 hour   Intake 1120 ml   Output 2475 ml   Net -1355 ml       Laboratory  Recent Labs     10/11/19  0308 10/12/19  0545 10/13/19  0400   WBC 6.7 6.2 5.1   RBC 3.27* 3.06* 3.18*   HEMOGLOBIN 8.6* 8.3* 8.3*   HEMATOCRIT 28.5* 27.1* 28.0*   MCV 87.2 88.6 88.1   MCH 26.3* 27.1 26.1*   MCHC 30.2* 30.6* 29.6*   RDW 65.2* 65.3* 63.7*   PLATELETCT 342 338 356   MPV 8.1* 8.6* 8.6*     Recent Labs     10/11/19  0528 10/12/19  0545 10/13/19  0400   SODIUM 137 134* 139   POTASSIUM 4.1 4.1 4.3   CHLORIDE 100 97 101   CO2 27 27 27   GLUCOSE 100* 99 92   BUN 11 13 8   CREATININE 0.55 0.58 0.56   CALCIUM 8.4 8.5 8.7                   Imaging  IR-PICC LINE PLACEMENT W/ GUIDANCE > AGE 5   Final Result                  Ultrasound-guided PICC placement performed by qualified nursing staff as    above.          CT-ABDOMEN-PELVIS WITH   Final Result      1.  Interval decrease in the intramuscular abscesses involving the left pelvis and thigh following drain placement. Collection in the pubococcygeus muscle is similar in size to the prior exam.      2.  Ventral hernia containing anterior bowel wall and fat. No secondary obstruction.      US-EXTREMITY NON VASCULAR UNILATERAL RIGHT   Final Result      Loculated collection in  the posteromedial left knee likely represents a Baker's cyst. No significant knee effusion is identified in the upper knee joint on recent MRI.      MR-FEMUR-W/O LEFT   Final Result      1.  Limited exam secondary to significant patient motion.   2.  Subacute fracture involving the left pubic bone with osteomyelitis and probable intraosseous abscess.   3.  Extensive edema and enlargement of the muscles of the medial compartment, pectineus muscle, and pubococcygeus muscle is consistent with known intramuscular abscesses. Fluid collection is most extensive in the adductor longus muscle.   4.  Subacute fractures of the sacrum               Comment: Results given to Dr. Varma through [place] text at 3:56 PM      MR-PELVIS W/O   Final Result      1.  Limited exam secondary to significant patient motion.   2.  Subacute fracture involving the left pubic bone with osteomyelitis and probable intraosseous abscess.   3.  Extensive edema and enlargement of the muscles of the medial compartment, pectineus muscle, and pubococcygeus muscle is consistent with known intramuscular abscesses. Fluid collection is most extensive in the adductor longus muscle.   4.  Subacute fractures of the sacrum               Comment: Results given to Dr. Varma through [place] text at 3:56 PM      CT-DRAIN-HEMATOMA - SEROMA   Final Result      1.  Left inguinal region muscular abscess.   2.  CT-guided drainage and catheter placement of large collection in the left obturator externus.   3.  CT-guided aspiration of smaller collection in the left pectineus muscle.      US-RUQ   Final Result      Hepatomegaly.      Status post cholecystectomy.      Dilated portal vein.      Status post cholecystectomy.      No biliary ductal dilatation.      Limited evaluation of the pancreas which is obscured.      MR-THORACIC SPINE-W/O   Final Result      1.  No acute fracture in the thoracic spine.   2.  Mild chronic compression deformity at T11.      MR-LUMBAR SPINE-W/O    Final Result      1.  Acute bilateral sacral alae fractures.   2.  There is edema in the lower posterior paraspinal soft tissue likely representing soft tissue contusion.   3.  There is no lumbar spine fractures.         CT-LSPINE W/O PLUS RECONS   Final Result      No acute fracture or traumatic malalignment.      CT-PELVIS WITH   Final Result      1.  Recent but probably subacute fracture of the left superior pubic ramus near the symphysis. Adjacent and just lateral to this are two complex masslike structures, the largest and most medial of which contains a fluid-filled cavity. These are    consistent with hematomata, possibly intramuscular.      US-EXTREMITY VENOUS LOWER UNILAT LEFT   Final Result      1.  No evidence of Left  lower extremity deep venous thrombosis.   2.  Two complex areas within the left groin, consistent with hematomata.            INTERPRETING LOCATION: [address] [facility], [place], [address]           Assessment/Plan  * Pubic ramus fracture (HCC)  Assessment & Plan  S/p mechanical GLF  MS Continue 30mg BID + 15mg in between continue with oxycodone for breakthrough, this regimen appears to be working better.   Trying to not use IV in prep for discharge/jakob.   Resume in house PT    Osteomyelitis (HCC)  Assessment & Plan  MRI femus shows Subacute fracture involving the left pubic bone with osteomyelitis and probable intraosseous abscess. Extensive edema and enlargement of the muscles of the medial compartment, pectineus muscle, and pubococcygeus muscle is consistent with known intramuscular abscesses. Fluid collection is most extensive in the adductor longus muscle.  ID following  Continue IV Ancef 11/19/19 as stop date.   S/p I&D and bone biopsy 10/9, cultures neg, awaiting for final.       Candida rash of groin  Assessment & Plan  Improving overall,   Resume Diflucan cream.    Sepsis (HCC)  Assessment & Plan  This is Sepsis Not present on admission  SIRS criteria identified on my evaluation include:  Tachycardia, with heart rate greater than 90 BPM and Leukocyosis, with WBC greater than 12,000  Source is left thigh abscess  Leukocytosis resolved  Sepsis has resolved.    Abscess of left thigh  Assessment & Plan  Multiple abscesses found on imaging CT/MRI  Cultures +MSSA showing GPC  Continue with IV Ancef Stop Date 11/19/2019  As above ID following.     Hematoma of left thigh  Assessment & Plan  2/2 to GLF  S/o I&D  Resume IV abx as above  Improving overall clinically    Bilateral leg edema  Assessment & Plan  Stable,   Albumin ~ 2.7  Encouraged boot TID    Iron deficiency anemia, unspecified iron deficiency anemia type- (present on admission)  Assessment & Plan  Stable, last admit he received IV iron.  No signs of gross bleeding.   Resume nutritional support      Essential hypertension- (present on admission)  Assessment & Plan  Well Controlled overall.   Resume Coreg, Losartan and lasix.     Chronic pain of right knee- (present on admission)  Assessment & Plan  Chronically on MS contin plus short acting oxycodone for breakthrough pain  As above.   Current combination appears to be working per patient     Rheumatoid arthritis (HCC)- (present on admission)  Assessment & Plan        Continue Humira as outpatient once infection has resolved.  Unfortunately at the time of acute infection, further immunosuppression would be contraindicated. Patient also reached out to  and response was the same.      The total time spent face to face with this patient was about 33 mins of which 60% of time was spent on counseling, review of records including pertinent lab data and studies, as well as discussing diagnostic evaluation and work up, planned therapeutic interventions and future disposition of care. Where indicated, the assessment and plan reflect discussion of patient with consultants, other healthcare providers, family members, and additional research needed to obtain further information in formulating the plan  of care of this patient.       VTE prophylaxis: lovenox

## 2019-10-13 NOTE — CARE PLAN
Problem: Mobility  Goal: Risk for activity intolerance will decrease  Outcome: PROGRESSING AS EXPECTED     Problem: Pain Management  Goal: Pain level will decrease to patient's comfort goal  Outcome: PROGRESSING SLOWER THAN EXPECTED

## 2019-10-13 NOTE — DISCHARGE PLANNING
Care Transition Team Discharge Planning    Anticipated Discharge Disposition: rehab    Action: Lsw spoke to MD and pt will have IV morphine d/shae today. Possibly can d/c to Rehab tomorrow or later.    Lsw contacted [facility] to update admissions as above.    Lsw spoke to RN station at Rehab. Lsw was directed to leave VM w/ admissions LINSEY Anthony.     LSw left VM explaining as above, and further reporting request for updated PT/OT assessment was placed as well.      Barriers to Discharge: rehab acceptance, open bed    Plan: f/u w/ medical team, rehab admissions

## 2019-10-13 NOTE — PROGRESS NOTES
Infectious Disease Progress Note    Author: Romie Galdamez M.D. Date & Time of service: 10/13/2019  9:36 AM    Chief Complaint:  Follow-up for left thigh abscess/pelvic OM    Interval History:  44 y.o. man with a history of rheumatoid arthritis on Humira, chronic right knee pain with plans for total right knee arthroplasty, and recent fall sustained 1 month prior to admission admitted for worsening left thigh/groin pain.  Patient found to have multiple fluid collections on imaging which were concerning for an abscess.  He underwent CT-guided drainage on 10/2 with cultures growing MSSA.    10/6 afebrile WBC 12.7 ongoing left groin pain however the crampy pain is somewhat decreased and he is able to ambulate better.  Plan for MRI of the pelvis and femur today  10/7 afebrile WBC 11.4 patient is very anxious after hearing about results of MRI.  MRI of the pelvis reveals osteomyelitis.  Awaiting surgical evaluation for possible surgical debridement.  Patient states left thigh pain remains unchanged and requiring multiple pain medications.  Pain worse with standing  10/8 afebrile WBC 8 patient is agreeable to surgery.  Repeat CT scan shows interval decrease and left thigh abscess.  Minimal output in drain.  Waiting for PICC line  10/9 AF WBC 6.6 plan for surgery today, also has flare up of rash on chest associated with itching  10/10 afebrile WBC 6.4 patient is complaining of significant postop pain.  He underwent debridement yesterday.  He is also complaining of constipation and has not had any bowel movement since Monday.  Wife at the bedside states that surgeon mentioned bone looked infected  10/11 T max 99.1. White count 6.7. Tolerating antibiotics. Pain at surgical site   10/12 afebrile, white count 6.2.  Tolerating antibiotics.  Pathology consistent with osteomyelitis.  OR cultures no growth till date.  Trying to wean off IV pain meds so he can go to rehab.  10/13 afebrile, white count 5.1.  Notes that there is  leakage around his IR drain site, feels that it may have been pulled out some, and the dressing is wet.  Continues to have pain.    Labs Reviewed.    Review of Systems:  Review of Systems   Constitutional: Negative for chills and fever.   Respiratory: Negative for cough and shortness of breath.    Gastrointestinal: Positive for abdominal pain and constipation. Negative for diarrhea, nausea and vomiting.   Musculoskeletal: Positive for myalgias.   Skin: Positive for rash. Negative for itching.        Stable   Neurological: Negative for dizziness and headaches.   All other systems reviewed and are negative.      Hemodynamics:  Temp (24hrs), Av.7 °C (98 °F), Min:36.4 °C (97.5 °F), Max:36.8 °C (98.2 °F)  Temperature: 36.4 °C (97.5 °F)  Pulse  Av.7  Min: 50  Max: 105   Blood Pressure: 124/79       Physical Exam:  Physical Exam   Constitutional: He is oriented to person, place, and time. He appears well-developed and well-nourished. No distress.   Pallor  Looks tired   HENT:   Mouth/Throat: No oropharyngeal exudate.   Eyes: Pupils are equal, round, and reactive to light. Conjunctivae and EOM are normal.   Neck: Neck supple.   Cardiovascular: Normal rate, regular rhythm and normal heart sounds.   Pulmonary/Chest: Effort normal and breath sounds normal.   Abdominal: Soft. He exhibits no distension.   Intertrigo in groin skin folds  Lower midline abdomen horizontal incision dressing is clean and dry   Musculoskeletal: He exhibits tenderness. He exhibits no edema.   Left groin/thigh drain -damp dressing, no active drainage around the drain site noted at this time  + Tenderness to palpation in left groin.  No edema  LUE PICC line - nontender, no surrounding erythema   Neurological: He is alert and oriented to person, place, and time. No cranial nerve deficit.   Skin: Skin is warm and dry. Rash noted. He is not diaphoretic.   Light brown macular rash on chest with some scaling with pressure, some coalescing into  patches, non-pruritic  B/L soles with thick flaky skin  Scaling of skin over knuckles B/L hands   Psychiatric: His behavior is normal.   Somewhat flat affect   Nursing note and vitals reviewed.      Meds:    Current Facility-Administered Medications:   •  ceFAZolin  •  simethicone  •  morphine ER  •  oxyCODONE immediate-release  •  enoxaparin  •  miconazole  •  vitamin D  •  calcium citrate  •  morphine ER  •  ferrous sulfate  •  furosemide  •  losartan  •  omeprazole  •  pregabalin  •  senna-docusate **AND** polyethylene glycol/lytes **AND** magnesium hydroxide **AND** bisacodyl  •  acetaminophen  •  nicotine **AND** Nicotine Replacement Patient Education Materials **AND** nicotine polacrilex  •  morphine injection    Labs:  Recent Labs     10/11/19  0308 10/12/19  0545 10/13/19  0400   WBC 6.7 6.2 5.1   RBC 3.27* 3.06* 3.18*   HEMOGLOBIN 8.6* 8.3* 8.3*   HEMATOCRIT 28.5* 27.1* 28.0*   MCV 87.2 88.6 88.1   MCH 26.3* 27.1 26.1*   RDW 65.2* 65.3* 63.7*   PLATELETCT 342 338 356   MPV 8.1* 8.6* 8.6*   NEUTSPOLYS 58.80 56.30 49.20   LYMPHOCYTES 25.10 25.80 31.60   MONOCYTES 12.40 13.00 13.40   EOSINOPHILS 1.50 2.40 2.80   BASOPHILS 0.70 0.60 1.40   RBCMORPHOLO Present  --   --      Recent Labs     10/11/19  0528 10/12/19  0545 10/13/19  0400   SODIUM 137 134* 139   POTASSIUM 4.1 4.1 4.3   CHLORIDE 100 97 101   CO2 27 27 27   GLUCOSE 100* 99 92   BUN 11 13 8     Recent Labs     10/11/19  0528 10/12/19  0545 10/13/19  0400   ALBUMIN  --  2.7*  --    TBILIRUBIN  --  0.2  --    ALKPHOSPHAT  --  153*  --    TOTPROTEIN  --  6.5  --    ALTSGPT  --  26  --    ASTSGOT  --  14  --    CREATININE 0.55 0.58 0.56       Imaging:  Ct-drain-hematoma - Seroma    Result Date: 10/2/2019  10/2/2019 12:14 PM HISTORY/REASON FOR EXAM:  Left inguinal fluid collection. Moderate sedation was administered with continuous monitoring of the patient under the direct supervision of  Total sedation time: 30 minutes The biopsy/drainage was  done utilizing low dose optimization CT techniques including auto modulation for  imaging and low mA CT/fluoroscopy mode for the procedure. PROCEDURE: After the informed consent the patient was placed on the CT table on supine position. The skin over the left groin was prepped. Localization CT scan demonstrates fluid collection in the left obturator externus and pectineus muscles. Subsequently after injecting lidocaine needle was advanced into the larger fluid collection. Pus was aspirated. Subsequently a wire was introduced. After dilating the tract an 8 Occitan drain was introduced and placed with its loop in the fluid collection. An approximately 100 mL of fluid was aspirated. Subsequently under the needle was advanced into the superficial/pectineus muscle fluid collection. An approximately 10 mL of fluid was aspirated. No drain was placed in the superficial fluid collection. A sample of the material was sent to the microbe allergy. The patient tolerated the procedure without any immediate complication.     1.  Left inguinal region muscular abscess. 2.  CT-guided drainage and catheter placement of large collection in the left obturator externus. 3.  CT-guided aspiration of smaller collection in the left pectineus muscle.    Ct-lspine W/o Plus Recons    Result Date: 9/28/2019 9/28/2019 4:58 AM HISTORY/REASON FOR EXAM: Back pain after recent fall TECHNIQUE/EXAM DESCRIPTION: CT lumbar spine without contrast, with reconstructions. Noncontrast helical scanning was obtained from T12 through the sacrum.  Sagittal reconstructions were generated from the axial images. Low dose optimization technique was utilized for this CT exam including automated exposure control and adjustment of the mA and/or kV according to patient size. COMPARISON:  None. FINDINGS: The bones are markedly osteopenic. There is a minimal compression deformity of the superior endplate of the L2 body, which is old. There is no evidence of acute  fracture. There is multilevel interspace and facet arthropathy.     No acute fracture or traumatic malalignment.    Ct-pelvis With    Result Date: 9/28/2019 9/28/2019 3:21 AM HISTORY/REASON FOR EXAM:  significant left groin pain, possible hematoma on us. TECHNIQUE/EXAM DESCRIPTION AND NUMBER OF VIEWS: CT scan of the pelvis with contrast. Contrast-enhanced helical scanning of the pelvis was obtained from the iliac crests through the pubic symphysis following the bolus administration of 100 mL of Omnipaque 350 nonionic contrast without complication. Low dose optimization technique was utilized for this CT exam including automated exposure control and adjustment of the mA and/or kV according to patient size. COMPARISON:  None. FINDINGS: In the deep left inguinal region, there is a masslike structure containing irregular fluid-filled cavity, which measures overall about 8.6 cm in longest dimension. Within the cavity or focal areas of increased attenuation consistent with calcification. This lesion is immediately adjacent to a recent no probably subacute fracture of the left superior pubic ramus near the symphysis. Lateral to this lesion is a smaller complex mass measuring about 7.3 cm maximum dimension. There is no abnormality within the visualized pelvic cavity and there is no free intraperitoneal fluid. The bladder is intact.     1.  Recent but probably subacute fracture of the left superior pubic ramus near the symphysis. Adjacent and just lateral to this are two complex masslike structures, the largest and most medial of which contains a fluid-filled cavity. These are consistent with hematomata, possibly intramuscular.    Dx-lumbar Spine-2 Or 3 Views    Result Date: 9/16/2019 9/16/2019 4:15 PM HISTORY/REASON FOR EXAM:  Atraumatic Pain TECHNIQUE/ EXAM DESCRIPTION AND NUMBER OF VIEWS:  3 views of the lumbar spine. COMPARISON: None. FINDINGS: Dextroconvex lumbar curvature. Evaluation of the sacrum is limited due to  overlying bowel content. No acute fracture. No malalignment. No compression deformity. Mild to moderate degenerative change of the lumbar spine.     Dextroconvex lumbar curvature. No acute osseous abnormality. No malalignment.    Dx-thoracic Spine-with Swimmers View    Result Date: 9/16/2019 9/16/2019 4:15 PM HISTORY/REASON FOR EXAM:  Atraumatic Pain; ?scoliotic MId back pain and low back pain after GLF x 2 weeks TECHNIQUE/EXAM DESCRIPTION AND NUMBER OF VIEWS:  Thoracic spine, 3 views. COMPARISON:  None. FINDINGS: Diffuse osseous demineralization, limiting evaluation for nondisplaced fracture. No acute fracture. No malalignment. Mild chronic height loss of the mid thoracic spine from T9 to T11. Mild degenerative change of the thoracic spine.     Mild chronic height loss of the mid thoracic spine from T9 to T11.    Mr-lumbar Spine-w/o    Result Date: 9/28/2019 9/28/2019 6:21 PM HISTORY/REASON FOR EXAM:  back pain. TECHNIQUE/EXAM DESCRIPTION: MRI of the lumbar spine without contrast. The study was performed on a [employer] Signa 1.5 Mae MRI scanner. T1 sagittal, T2 fast spin-echo sagittal, and T2 axial images were obtained of the lumbar spine. COMPARISON:  None. FINDINGS: There are bilateral sacral alar fractures. The lumbar spine maintains normal height and alignment. At the level of L5-S1, there is no spinal or neural foraminal stenosis. At the level of L4-5, there is no spinal or neural foraminal stenosis. At the level of L3-4, there is no spinal or neural foraminal stenosis. At the level of the L2-3, there is no spinal or neural foraminal stenosis. At the level of L1-2, there is no spinal or neural foraminal stenosis. The conus terminates at the level of L1. The visualized lower thoracic spinal cord appear normal. There is edema in the posterior paraspinal soft tissue likely representing soft tissue contusion.     1.  Acute bilateral sacral alae fractures. 2.  There is edema in the lower posterior paraspinal soft  tissue likely representing soft tissue contusion. 3.  There is no lumbar spine fractures.     Mr-thoracic Spine-w/o    Result Date: 9/28/2019 9/28/2019 6:21 PM HISTORY/REASON FOR EXAM:  back pain TECHNIQUE/EXAM DESCRIPTION: MRI of the thoracic spine without contrast. The study was performed on a [employer] Signa 1.5 Mae MRI scanner. T1 sagittal, T2 fast spin-echo sagittal, and T2 axial images were obtained of the thoracic spine. COMPARISON: None. FINDINGS: There is mild chronic compression deformity at T11. There is no fracture or dislocation. There is no pathologic marrow infiltration. There is no intradural extramedullary lesion. There is no abnormal intramedullary T2 signal intensity in the thoracic spinal cord.     1.  No acute fracture in the thoracic spine. 2.  Mild chronic compression deformity at T11.    Us-extremity Venous Lower Unilat Left    Result Date: 9/28/2019  HISTORY/REASON FOR EXAM:  Leg swelling after fall TECHNIQUE/EXAM DESCRIPTION: Left lower extremity doppler venous ultrasound was performed. Using both color and pulse-wave Doppler imaging, multiple images were obtained from the common femoral vein origins distally through the popliteal trifurcations.  9/28/2019 12:54 AM COMPARISON:  None. FINDINGS: REAL-TIME GRAY-SCALE IMAGING: Real-time gray-scale imaging reveals no evidence of focal wall thickening. COLOR AND DUPLEX DOPPLER IMAGING: Graded compression was used to demonstrate patent lumens.  There is no evidence for luminal thrombus.  There is normal response to augmentation and respiratory variation. There are two rounded complex areas in the left groin region, the largest measuring about 10.9 cm diameter and the smaller, about 8.3 cm in diameter, both of which are consistent with hematomata.     1.  No evidence of Left  lower extremity deep venous thrombosis. 2.  Two complex areas within the left groin, consistent with hematomata. INTERPRETING LOCATION: [address] [place] [address],  [id number]    Us-ruq    Result Date: 10/1/2019  10/1/2019 5:49 PM HISTORY/REASON FOR EXAM:  Edema Abdominal pain TECHNIQUE/EXAM DESCRIPTION AND NUMBER OF VIEWS:  Real-time sonography of the liver and biliary tree. COMPARISON: None FINDINGS: The liver is normal in contour. There is no evidence of solid mass lesion. The liver measures 20.15 cm. The gallbladder is surgically absent. The common duct measures 0.44 cm. The pancreas is obscured by bowel gas. The visualized aorta is normal in caliber. Intrahepatic IVC is patent. The portal vein is patent with hepatopetal flow. The MPV measures 1.57 cm. The right kidney measures 11.06 cm. There is no hydronephrosis. There is no ascites.     Hepatomegaly. Status post cholecystectomy. Dilated portal vein. Status post cholecystectomy. No biliary ductal dilatation. Limited evaluation of the pancreas which is obscured.      Micro:  See below    Assessment:  Active Hospital Problems    Diagnosis       •    • Rheumatoid arthritis (HCC) [M06.9]   • Immunosuppressed state    Pityriasis versicolor   • Abscess of left thigh [L02.416]    Pelvic osteomyelitis    MSSA infection       Plan:  Osteomyelitis of the left pubic bone(noted on MRI)  Status post recent fall found to have subacute fracture on presentation  ESR - 55  Anticipate 6 weeks of IV antibiotics with a stop date of 11/19/2019  Continue antibiotics per below  Status post I&D on 10/9/2019 by Dr. Olivares. Thickened periosteum and soft bone was encountered intra-op  Some leakage around the IR drain site per patient and dressing is damp. Recommend IR re-evaluation of the drain  OR pathology consistent with osteomyelitis, cultures no growth to date    Abscess of left thigh, improved  No fevers  Persistent leukocytosis, now resolved  Sustained recent fall 1 month prior to admission  Blood cultures-negative to date  Images reviewed. Initial CT with 2 complex abscesses measuring 8.6cm and 7.2cm respectively in the longest  dimension  Status post CT-guided drainage on 10/2  Repeat CT on 10/7-interval decrease in the left pelvis and thigh intramuscular abscess - difficult to measure given complex nature  Cultures+ MSSA.  Gram stain after discussion with micro lab was corrected to GPCs alone  Chenged IV Unasyn to IV Ancef 2 g every 8 hours on 10/12  Stop date 11/19/2019 as above  Weekly CBC with differential, CMP while on IV antibiotics  Further surgical management performed on 10/9 as above    Pityriasis versicolor, intertrigo  Light brown macular rash on chest and trunk with some scaling with pressure, some coalescing into patches, non-pruritic  Has been ongoing for about 2 weeks, similar presentation a month ago  Received 10 days of fluconazole  Patient is on miconazole cream for intertrigo. Recommend application to chest as well -  started 10/11. Will require 2-4 weeks of treatment    Rheumatoid arthritis  On Humira prior to admission  Chronically immunosuppressed    Chronic right knee pain  Plan for future total right knee arthroplasty    Recent fall  Approximately 1 month prior to admission  Imaging on presentation revealed subacute fracture of the left pubic bone    Disposition: TBD, likely rehab    Plan of care discussed with internal medicine/Dr. Pena     ID will follow.  Please call with questions.

## 2019-10-13 NOTE — PROGRESS NOTES
Received bedside shift report regarding patient and assumed care. Patient is sleeping, calm and stable, currently positioned in bed for comfort and safety; call light within reach. No s/s of pain or discomfort at this time.  Will continue to monitor.

## 2019-10-13 NOTE — PROGRESS NOTES
Assumed care. A&o x4. Pt resting quietly in bed. Iv infusing in left upper arm picc. Safety maintained. Bed in low position.

## 2019-10-14 LAB
ANION GAP SERPL CALC-SCNC: 11 MMOL/L (ref 0–11.9)
BACTERIA SPEC ANAEROBE CULT: NORMAL
BASOPHILS # BLD AUTO: 1.1 % (ref 0–1.8)
BASOPHILS # BLD: 0.06 K/UL (ref 0–0.12)
BUN SERPL-MCNC: 10 MG/DL (ref 8–22)
CALCIUM SERPL-MCNC: 8.8 MG/DL (ref 8.4–10.2)
CHLORIDE SERPL-SCNC: 103 MMOL/L (ref 96–112)
CO2 SERPL-SCNC: 25 MMOL/L (ref 20–33)
CREAT SERPL-MCNC: 0.78 MG/DL (ref 0.5–1.4)
EOSINOPHIL # BLD AUTO: 0.14 K/UL (ref 0–0.51)
EOSINOPHIL NFR BLD: 2.6 % (ref 0–6.9)
ERYTHROCYTE [DISTWIDTH] IN BLOOD BY AUTOMATED COUNT: 64.4 FL (ref 35.9–50)
GLUCOSE SERPL-MCNC: 102 MG/DL (ref 65–99)
HCT VFR BLD AUTO: 29.4 % (ref 42–52)
HGB BLD-MCNC: 8.9 G/DL (ref 14–18)
IMM GRANULOCYTES # BLD AUTO: 0.1 K/UL (ref 0–0.11)
IMM GRANULOCYTES NFR BLD AUTO: 1.8 % (ref 0–0.9)
LYMPHOCYTES # BLD AUTO: 1.95 K/UL (ref 1–4.8)
LYMPHOCYTES NFR BLD: 35.8 % (ref 22–41)
MCH RBC QN AUTO: 26.9 PG (ref 27–33)
MCHC RBC AUTO-ENTMCNC: 30.3 G/DL (ref 33.7–35.3)
MCV RBC AUTO: 88.8 FL (ref 81.4–97.8)
MONOCYTES # BLD AUTO: 0.62 K/UL (ref 0–0.85)
MONOCYTES NFR BLD AUTO: 11.4 % (ref 0–13.4)
NEUTROPHILS # BLD AUTO: 2.57 K/UL (ref 1.82–7.42)
NEUTROPHILS NFR BLD: 47.3 % (ref 44–72)
NRBC # BLD AUTO: 0 K/UL
NRBC BLD-RTO: 0 /100 WBC
PLATELET # BLD AUTO: 377 K/UL (ref 164–446)
PMV BLD AUTO: 8.7 FL (ref 9–12.9)
POTASSIUM SERPL-SCNC: 4.3 MMOL/L (ref 3.6–5.5)
RBC # BLD AUTO: 3.31 M/UL (ref 4.7–6.1)
SIGNIFICANT IND 70042: NORMAL
SITE SITE: NORMAL
SODIUM SERPL-SCNC: 139 MMOL/L (ref 135–145)
SOURCE SOURCE: NORMAL
WBC # BLD AUTO: 5.4 K/UL (ref 4.8–10.8)

## 2019-10-14 PROCEDURE — 700111 HCHG RX REV CODE 636 W/ 250 OVERRIDE (IP): Performed by: INTERNAL MEDICINE

## 2019-10-14 PROCEDURE — 700105 HCHG RX REV CODE 258: Performed by: INTERNAL MEDICINE

## 2019-10-14 PROCEDURE — 99232 SBSQ HOSP IP/OBS MODERATE 35: CPT | Performed by: INTERNAL MEDICINE

## 2019-10-14 PROCEDURE — A9270 NON-COVERED ITEM OR SERVICE: HCPCS | Performed by: HOSPITALIST

## 2019-10-14 PROCEDURE — 700102 HCHG RX REV CODE 250 W/ 637 OVERRIDE(OP): Performed by: HOSPITALIST

## 2019-10-14 PROCEDURE — 85025 COMPLETE CBC W/AUTO DIFF WBC: CPT

## 2019-10-14 PROCEDURE — 97116 GAIT TRAINING THERAPY: CPT

## 2019-10-14 PROCEDURE — A9270 NON-COVERED ITEM OR SERVICE: HCPCS | Performed by: INTERNAL MEDICINE

## 2019-10-14 PROCEDURE — 700102 HCHG RX REV CODE 250 W/ 637 OVERRIDE(OP): Performed by: INTERNAL MEDICINE

## 2019-10-14 PROCEDURE — 97535 SELF CARE MNGMENT TRAINING: CPT

## 2019-10-14 PROCEDURE — 80048 BASIC METABOLIC PNL TOTAL CA: CPT

## 2019-10-14 PROCEDURE — 99232 SBSQ HOSP IP/OBS MODERATE 35: CPT | Performed by: HOSPITALIST

## 2019-10-14 PROCEDURE — 770001 HCHG ROOM/CARE - MED/SURG/GYN PRIV*

## 2019-10-14 RX ADMIN — CEFAZOLIN 2 G: 10 INJECTION, POWDER, FOR SOLUTION INTRAVENOUS at 05:07

## 2019-10-14 RX ADMIN — OXYCODONE HYDROCHLORIDE 10 MG: 10 TABLET ORAL at 04:03

## 2019-10-14 RX ADMIN — OMEPRAZOLE 40 MG: 20 CAPSULE, DELAYED RELEASE ORAL at 05:09

## 2019-10-14 RX ADMIN — OXYCODONE HYDROCHLORIDE 10 MG: 10 TABLET ORAL at 09:14

## 2019-10-14 RX ADMIN — SENNOSIDES, DOCUSATE SODIUM 2 TABLET: 50; 8.6 TABLET, FILM COATED ORAL at 05:09

## 2019-10-14 RX ADMIN — OXYCODONE HYDROCHLORIDE 10 MG: 10 TABLET ORAL at 22:36

## 2019-10-14 RX ADMIN — PREGABALIN 25 MG: 25 CAPSULE ORAL at 17:20

## 2019-10-14 RX ADMIN — OXYCODONE HYDROCHLORIDE 10 MG: 10 TABLET ORAL at 13:43

## 2019-10-14 RX ADMIN — NICOTINE 14 MG: 14 PATCH, EXTENDED RELEASE TRANSDERMAL at 05:07

## 2019-10-14 RX ADMIN — MICONAZOLE NITRATE: 20 CREAM TOPICAL at 17:22

## 2019-10-14 RX ADMIN — MORPHINE SULFATE 30 MG: 30 TABLET, EXTENDED RELEASE ORAL at 17:20

## 2019-10-14 RX ADMIN — CEFAZOLIN 2 G: 10 INJECTION, POWDER, FOR SOLUTION INTRAVENOUS at 21:20

## 2019-10-14 RX ADMIN — PREGABALIN 25 MG: 25 CAPSULE ORAL at 12:31

## 2019-10-14 RX ADMIN — MORPHINE SULFATE 30 MG: 30 TABLET, EXTENDED RELEASE ORAL at 05:08

## 2019-10-14 RX ADMIN — ENOXAPARIN SODIUM 40 MG: 100 INJECTION SUBCUTANEOUS at 05:07

## 2019-10-14 RX ADMIN — PREGABALIN 25 MG: 25 CAPSULE ORAL at 05:09

## 2019-10-14 RX ADMIN — SENNOSIDES, DOCUSATE SODIUM 2 TABLET: 50; 8.6 TABLET, FILM COATED ORAL at 17:20

## 2019-10-14 RX ADMIN — MORPHINE SULFATE 30 MG: 30 TABLET, EXTENDED RELEASE ORAL at 12:31

## 2019-10-14 RX ADMIN — CEFAZOLIN 2 G: 10 INJECTION, POWDER, FOR SOLUTION INTRAVENOUS at 14:06

## 2019-10-14 RX ADMIN — OXYCODONE HYDROCHLORIDE 10 MG: 10 TABLET ORAL at 18:13

## 2019-10-14 RX ADMIN — VITAMIN D, TAB 1000IU (100/BT) 1000 UNITS: 25 TAB at 05:07

## 2019-10-14 RX ADMIN — FERROUS SULFATE TAB 325 MG (65 MG ELEMENTAL FE) 325 MG: 325 (65 FE) TAB at 05:09

## 2019-10-14 RX ADMIN — CALCIUM CITRATE 200 MG (950 MG) TABLET 950 MG: at 05:08

## 2019-10-14 RX ADMIN — OMEPRAZOLE 40 MG: 20 CAPSULE, DELAYED RELEASE ORAL at 17:20

## 2019-10-14 RX ADMIN — MICONAZOLE NITRATE: 20 CREAM TOPICAL at 05:10

## 2019-10-14 ASSESSMENT — ENCOUNTER SYMPTOMS
PND: 0
SPUTUM PRODUCTION: 0
HEARTBURN: 0
PHOTOPHOBIA: 0
DIARRHEA: 0
CONSTIPATION: 0
STRIDOR: 0
SENSORY CHANGE: 0
HEADACHES: 0
SPEECH CHANGE: 0
CHILLS: 0
ORTHOPNEA: 0
BACK PAIN: 0
BLURRED VISION: 0
MYALGIAS: 1
SORE THROAT: 0
ABDOMINAL PAIN: 0
PALPITATIONS: 0
CLAUDICATION: 0
DOUBLE VISION: 0
WEAKNESS: 0
NAUSEA: 0
COUGH: 0
MEMORY LOSS: 0
HEMOPTYSIS: 0
VOMITING: 0
BLOOD IN STOOL: 0
NERVOUS/ANXIOUS: 0
DIZZINESS: 0
CONSTIPATION: 1
FEVER: 0
SHORTNESS OF BREATH: 0
DEPRESSION: 0
TREMORS: 0
EYE PAIN: 0
NECK PAIN: 0
TINGLING: 0
ABDOMINAL PAIN: 1

## 2019-10-14 ASSESSMENT — COGNITIVE AND FUNCTIONAL STATUS - GENERAL
TURNING FROM BACK TO SIDE WHILE IN FLAT BAD: A LITTLE
STANDING UP FROM CHAIR USING ARMS: A LITTLE
MOBILITY SCORE: 15
TOILETING: A LOT
DRESSING REGULAR UPPER BODY CLOTHING: A LITTLE
DRESSING REGULAR LOWER BODY CLOTHING: A LOT
DAILY ACTIVITIY SCORE: 15
WALKING IN HOSPITAL ROOM: A LOT
SUGGESTED CMS G CODE MODIFIER DAILY ACTIVITY: CK
EATING MEALS: A LITTLE
PERSONAL GROOMING: A LITTLE
SUGGESTED CMS G CODE MODIFIER MOBILITY: CK
CLIMB 3 TO 5 STEPS WITH RAILING: TOTAL
MOVING FROM LYING ON BACK TO SITTING ON SIDE OF FLAT BED: A LITTLE
HELP NEEDED FOR BATHING: A LOT
MOVING TO AND FROM BED TO CHAIR: A LITTLE

## 2019-10-14 ASSESSMENT — GAIT ASSESSMENTS
GAIT LEVEL OF ASSIST: MINIMAL ASSIST
DEVIATION: ANTALGIC;STEP TO;SHUFFLED GAIT
DISTANCE (FEET): 20
ASSISTIVE DEVICE: FRONT WHEEL WALKER

## 2019-10-14 NOTE — PROGRESS NOTES
Infectious Disease Progress Note    Author: Aury Silva M.D. Date & Time of service: 10/14/2019  8:28 AM    Chief Complaint:  Follow-up for left thigh abscess/pelvic OM     Interval History:  44 y.o. man with a history of rheumatoid arthritis on Humira, chronic right knee pain with plans for total right knee arthroplasty, and recent fall sustained 1 month prior to admission admitted for worsening left thigh/groin pain.  Patient found to have multiple fluid collections on imaging which were concerning for an abscess.  He underwent CT-guided drainage on 10/2 with cultures growing MSSA.     10/6 afebrile WBC 12.7 ongoing left groin pain however the crampy pain is somewhat decreased and he is able to ambulate better.  Plan for MRI of the pelvis and femur today  10/7 afebrile WBC 11.4 patient is very anxious after hearing about results of MRI.  MRI of the pelvis reveals osteomyelitis.  Awaiting surgical evaluation for possible surgical debridement.  Patient states left thigh pain remains unchanged and requiring multiple pain medications.  Pain worse with standing  10/8 afebrile WBC 8 patient is agreeable to surgery.  Repeat CT scan shows interval decrease and left thigh abscess.  Minimal output in drain.  Waiting for PICC line  10/9 AF WBC 6.6 plan for surgery today, also has flare up of rash on chest associated with itching  10/10 afebrile WBC 6.4 patient is complaining of significant postop pain.  He underwent debridement yesterday.  He is also complaining of constipation and has not had any bowel movement since Monday.  Wife at the bedside states that surgeon mentioned bone looked infected  10/11 T max 99.1. White count 6.7. Tolerating antibiotics. Pain at surgical site   10/12 afebrile, white count 6.2.  Tolerating antibiotics.  Pathology consistent with osteomyelitis.  OR cultures no growth till date.  Trying to wean off IV pain meds so he can go to rehab.  10/13 afebrile, white count 5.1.  Notes that there  is leakage around his IR drain site, feels that it may have been pulled out some, and the dressing is wet.  Continues to have pain.   10/14     Review of Systems:  Review of Systems   Constitutional: Negative for fever and malaise/fatigue.   Gastrointestinal: Positive for abdominal pain and constipation. Negative for diarrhea, nausea and vomiting.   Musculoskeletal: Positive for joint pain and myalgias.       Hemodynamics:  Temp (24hrs), Av.7 °C (98.1 °F), Min:36.6 °C (97.9 °F), Max:37 °C (98.6 °F)  Temperature: 36.6 °C (97.9 °F)  Pulse  Av.2  Min: 50  Max: 105   Blood Pressure: 103/75       Physical Exam:  Physical Exam   Constitutional: He is oriented to person, place, and time. He appears well-developed and well-nourished.   HENT:   Head: Normocephalic and atraumatic.   Eyes: Pupils are equal, round, and reactive to light. Conjunctivae and EOM are normal.   Cardiovascular: Normal rate, regular rhythm and normal heart sounds.   Pulmonary/Chest: Effort normal and breath sounds normal.   Abdominal: Soft. Bowel sounds are normal. He exhibits no distension. There is no tenderness. There is no rebound and no guarding.   Musculoskeletal: He exhibits no edema.   Right knee edema.     Neurological: He is alert and oriented to person, place, and time.   Skin: Rash noted.   Mild erythematous rash in groin, patchy, scaling macular rash on abdomen chest   Psychiatric: He has a normal mood and affect. His behavior is normal.       Meds:    Current Facility-Administered Medications:   •  morphine ER  •  ceFAZolin  •  simethicone  •  oxyCODONE immediate-release  •  enoxaparin  •  miconazole  •  vitamin D  •  calcium citrate  •  ferrous sulfate  •  furosemide  •  losartan  •  omeprazole  •  pregabalin  •  senna-docusate **AND** polyethylene glycol/lytes **AND** magnesium hydroxide **AND** bisacodyl  •  acetaminophen  •  nicotine **AND** Nicotine Replacement Patient Education Materials **AND** nicotine polacrilex  •   morphine injection    Labs:  Recent Labs     10/12/19  0545 10/13/19  0400 10/14/19  0411   WBC 6.2 5.1 5.4   RBC 3.06* 3.18* 3.31*   HEMOGLOBIN 8.3* 8.3* 8.9*   HEMATOCRIT 27.1* 28.0* 29.4*   MCV 88.6 88.1 88.8   MCH 27.1 26.1* 26.9*   RDW 65.3* 63.7* 64.4*   PLATELETCT 338 356 377   MPV 8.6* 8.6* 8.7*   NEUTSPOLYS 56.30 49.20 47.30   LYMPHOCYTES 25.80 31.60 35.80   MONOCYTES 13.00 13.40 11.40   EOSINOPHILS 2.40 2.80 2.60   BASOPHILS 0.60 1.40 1.10     Recent Labs     10/12/19  0545 10/13/19  0400 10/14/19  0411   SODIUM 134* 139 139   POTASSIUM 4.1 4.3 4.3   CHLORIDE 97 101 103   CO2 27 27 25   GLUCOSE 99 92 102*   BUN 13 8 10     Recent Labs     10/12/19  0545 10/13/19  0400 10/14/19  0411   ALBUMIN 2.7*  --   --    TBILIRUBIN 0.2  --   --    ALKPHOSPHAT 153*  --   --    TOTPROTEIN 6.5  --   --    ALTSGPT 26  --   --    ASTSGOT 14  --   --    CREATININE 0.58 0.56 0.78       Imaging:  Ct-abdomen-pelvis With    Result Date: 10/7/2019  10/7/2019 4:57 PM HISTORY/REASON FOR EXAM:  Left groin pain. History of CT drain placement on 10/2/2018. TECHNIQUE/EXAM DESCRIPTION:   CT scan of the abdomen and pelvis with contrast. Contrast-enhanced helical scanning was obtained from the diaphragmatic domes through the pubic symphysis following the bolus administration of nonionic contrast without complication. 100 mL of Omnipaque 350 nonionic contrast was administered without complication. Low dose optimization technique was utilized for this CT exam including automated exposure control and adjustment of the mA and/or kV according to patient size. COMPARISON: 9/28/2019 FINDINGS: The visualized lung bases are unremarkable. CT Abdomen: The liver, spleen, adrenal glands, pancreas, and kidneys are unremarkable. The gallbladder has been removed. There are postoperative changes in the bowel scattered air-fluid levels. A small ventral hernia superior to the umbilicus contains the anterior wall of the colon without secondary obstruction.  No transition point to suggest obstruction appreciated. There  is a moderate amount of stool in the rectosigmoid colon. CT Pelvis: The bladder is unremarkable. A drain has been placed in the fluid collection within the left abductor muscles. The collection has decreased in size from the prior exam and is difficult to measure. Fluid collection in the anterior aspect of the adductor longus muscle measures approximately 5.3 x 2.8 x 2 cm, previously approximately 6.5 x 3.5 x 1.7 cm. Fluid collection within the left pubococcygeus muscle measures approximately 2.5 x 1.6 x 2.1 cm, previously 2.4 x 2.3 x 1.1 cm. There is edema in the anterior subcutaneous tissues. Fracture of the left pubic bone is again noted.     1.  Interval decrease in the intramuscular abscesses involving the left pelvis and thigh following drain placement. Collection in the pubococcygeus muscle is similar in size to the prior exam. 2.  Ventral hernia containing anterior bowel wall and fat. No secondary obstruction.    Ct-drain-hematoma - Seroma    Result Date: 10/2/2019  10/2/2019 12:14 PM HISTORY/REASON FOR EXAM:  Left inguinal fluid collection. Moderate sedation was administered with continuous monitoring of the patient under the direct supervision of  Total sedation time: 30 minutes The biopsy/drainage was done utilizing low dose optimization CT techniques including auto modulation for  imaging and low mA CT/fluoroscopy mode for the procedure. PROCEDURE: After the informed consent the patient was placed on the CT table on supine position. The skin over the left groin was prepped. Localization CT scan demonstrates fluid collection in the left obturator externus and pectineus muscles. Subsequently after injecting lidocaine needle was advanced into the larger fluid collection. Pus was aspirated. Subsequently a wire was introduced. After dilating the tract an 8 Bengali drain was introduced and placed with its loop in the fluid  collection. An approximately 100 mL of fluid was aspirated. Subsequently under the needle was advanced into the superficial/pectineus muscle fluid collection. An approximately 10 mL of fluid was aspirated. No drain was placed in the superficial fluid collection. A sample of the material was sent to the microbe allergy. The patient tolerated the procedure without any immediate complication.     1.  Left inguinal region muscular abscess. 2.  CT-guided drainage and catheter placement of large collection in the left obturator externus. 3.  CT-guided aspiration of smaller collection in the left pectineus muscle.    Ct-lspine W/o Plus Recons    Result Date: 9/28/2019 9/28/2019 4:58 AM HISTORY/REASON FOR EXAM: Back pain after recent fall TECHNIQUE/EXAM DESCRIPTION: CT lumbar spine without contrast, with reconstructions. Noncontrast helical scanning was obtained from T12 through the sacrum.  Sagittal reconstructions were generated from the axial images. Low dose optimization technique was utilized for this CT exam including automated exposure control and adjustment of the mA and/or kV according to patient size. COMPARISON:  None. FINDINGS: The bones are markedly osteopenic. There is a minimal compression deformity of the superior endplate of the L2 body, which is old. There is no evidence of acute fracture. There is multilevel interspace and facet arthropathy.     No acute fracture or traumatic malalignment.    Ct-pelvis With    Result Date: 9/28/2019 9/28/2019 3:21 AM HISTORY/REASON FOR EXAM:  significant left groin pain, possible hematoma on us. TECHNIQUE/EXAM DESCRIPTION AND NUMBER OF VIEWS: CT scan of the pelvis with contrast. Contrast-enhanced helical scanning of the pelvis was obtained from the iliac crests through the pubic symphysis following the bolus administration of 100 mL of Omnipaque 350 nonionic contrast without complication. Low dose optimization technique was utilized for this CT exam including automated  exposure control and adjustment of the mA and/or kV according to patient size. COMPARISON:  None. FINDINGS: In the deep left inguinal region, there is a masslike structure containing irregular fluid-filled cavity, which measures overall about 8.6 cm in longest dimension. Within the cavity or focal areas of increased attenuation consistent with calcification. This lesion is immediately adjacent to a recent no probably subacute fracture of the left superior pubic ramus near the symphysis. Lateral to this lesion is a smaller complex mass measuring about 7.3 cm maximum dimension. There is no abnormality within the visualized pelvic cavity and there is no free intraperitoneal fluid. The bladder is intact.     1.  Recent but probably subacute fracture of the left superior pubic ramus near the symphysis. Adjacent and just lateral to this are two complex masslike structures, the largest and most medial of which contains a fluid-filled cavity. These are consistent with hematomata, possibly intramuscular.    Dx-lumbar Spine-2 Or 3 Views    Result Date: 9/16/2019 9/16/2019 4:15 PM HISTORY/REASON FOR EXAM:  Atraumatic Pain TECHNIQUE/ EXAM DESCRIPTION AND NUMBER OF VIEWS:  3 views of the lumbar spine. COMPARISON: None. FINDINGS: Dextroconvex lumbar curvature. Evaluation of the sacrum is limited due to overlying bowel content. No acute fracture. No malalignment. No compression deformity. Mild to moderate degenerative change of the lumbar spine.     Dextroconvex lumbar curvature. No acute osseous abnormality. No malalignment.    Dx-thoracic Spine-with Swimmers View    Result Date: 9/16/2019 9/16/2019 4:15 PM HISTORY/REASON FOR EXAM:  Atraumatic Pain; ?scoliotic MId back pain and low back pain after GLF x 2 weeks TECHNIQUE/EXAM DESCRIPTION AND NUMBER OF VIEWS:  Thoracic spine, 3 views. COMPARISON:  None. FINDINGS: Diffuse osseous demineralization, limiting evaluation for nondisplaced fracture. No acute fracture. No malalignment.  Mild chronic height loss of the mid thoracic spine from T9 to T11. Mild degenerative change of the thoracic spine.     Mild chronic height loss of the mid thoracic spine from T9 to T11.    Mr-femur-w/o Left    Result Date: 10/6/2019  10/6/2019 11:08 AM HISTORY/REASON FOR EXAM:  Left upper leg edema and redness TECHNIQUE/EXAM DESCRIPTION: MRI of the LEFT femur and pelvis without. The study was performed on a [employer] Signa 1.5 Mae MRI scanner. T1 sagittal, T1 coronal, T1 axial, T2 fat-suppressed axial, T1 postcontrast fat-suppressed axial, and fast spin-echo inversion recovery sagittal images were obtained of the femur. Additional axial and coronal T1 and T2 fat-sat or STIR images obtained of the pelvis. COMPARISON: CT pelvis 9/28/2018 FINDINGS: Exam is limited secondary to significant patient motion. There is edema in the sacrum bilaterally, likely related to subacute stress fracture. There is subtle sclerosis visualized on CT. Additionally, there is a comminuted fracture of the left pubic bone which appears subacute to chronic. There is associated increased T2 signal and confluent low T1 signal. Low T1 signal extends towards the acetabulum. The fluid within the abductor minimus muscle appears to communicate with high T2 signal extending into the left inferior pubic ramus (image 28, series 7 of the  pelvic MRI) and superior pubic ramus (image 21, series 4 of the pelvic MRI). There is osseous expansion and probable cortical destruction, best seen on the T1 coronal sequence of the pelvis. No pubic symphysis or SI joint widening is identified. No abnormal marrow signal is appreciated in the visualized femur. There is edema and enlargement of the muscles of the medial compartment and pectineus muscle. Low signal within the abductor muscles is consistent with calcification seen on recent CT. There is fluid within the fibers of the adductor longus muscle. There  is significant edema in the subcutaneous tissues. There is an  additional fluid collection within the pubococcygeus muscle on the left. No significant hip effusion is identified. Limited visualization of the visceral pelvis is grossly unremarkable.     1.  Limited exam secondary to significant patient motion. 2.  Subacute fracture involving the left pubic bone with osteomyelitis and probable intraosseous abscess. 3.  Extensive edema and enlargement of the muscles of the medial compartment, pectineus muscle, and pubococcygeus muscle is consistent with known intramuscular abscesses. Fluid collection is most extensive in the adductor longus muscle. 4.  Subacute fractures of the sacrum Comment: Results given to Dr. Varma through [place] text at 3:56 PM    Mr-lumbar Spine-w/o    Result Date: 9/28/2019 9/28/2019 6:21 PM HISTORY/REASON FOR EXAM:  back pain. TECHNIQUE/EXAM DESCRIPTION: MRI of the lumbar spine without contrast. The study was performed on a [employer] Signa 1.5 Mae MRI scanner. T1 sagittal, T2 fast spin-echo sagittal, and T2 axial images were obtained of the lumbar spine. COMPARISON:  None. FINDINGS: There are bilateral sacral alar fractures. The lumbar spine maintains normal height and alignment. At the level of L5-S1, there is no spinal or neural foraminal stenosis. At the level of L4-5, there is no spinal or neural foraminal stenosis. At the level of L3-4, there is no spinal or neural foraminal stenosis. At the level of the L2-3, there is no spinal or neural foraminal stenosis. At the level of L1-2, there is no spinal or neural foraminal stenosis. The conus terminates at the level of L1. The visualized lower thoracic spinal cord appear normal. There is edema in the posterior paraspinal soft tissue likely representing soft tissue contusion.     1.  Acute bilateral sacral alae fractures. 2.  There is edema in the lower posterior paraspinal soft tissue likely representing soft tissue contusion. 3.  There is no lumbar spine fractures.     Mr-thoracic Spine-w/o    Result Date:  9/28/2019 9/28/2019 6:21 PM HISTORY/REASON FOR EXAM:  back pain TECHNIQUE/EXAM DESCRIPTION: MRI of the thoracic spine without contrast. The study was performed on a [employer] Signa 1.5 Mae MRI scanner. T1 sagittal, T2 fast spin-echo sagittal, and T2 axial images were obtained of the thoracic spine. COMPARISON: None. FINDINGS: There is mild chronic compression deformity at T11. There is no fracture or dislocation. There is no pathologic marrow infiltration. There is no intradural extramedullary lesion. There is no abnormal intramedullary T2 signal intensity in the thoracic spinal cord.     1.  No acute fracture in the thoracic spine. 2.  Mild chronic compression deformity at T11.    Us-extremity Venous Lower Unilat Left    Result Date: 9/28/2019  HISTORY/REASON FOR EXAM:  Leg swelling after fall TECHNIQUE/EXAM DESCRIPTION: Left lower extremity doppler venous ultrasound was performed. Using both color and pulse-wave Doppler imaging, multiple images were obtained from the common femoral vein origins distally through the popliteal trifurcations.  9/28/2019 12:54 AM COMPARISON:  None. FINDINGS: REAL-TIME GRAY-SCALE IMAGING: Real-time gray-scale imaging reveals no evidence of focal wall thickening. COLOR AND DUPLEX DOPPLER IMAGING: Graded compression was used to demonstrate patent lumens.  There is no evidence for luminal thrombus.  There is normal response to augmentation and respiratory variation. There are two rounded complex areas in the left groin region, the largest measuring about 10.9 cm diameter and the smaller, about 8.3 cm in diameter, both of which are consistent with hematomata.     1.  No evidence of Left  lower extremity deep venous thrombosis. 2.  Two complex areas within the left groin, consistent with hematomata. [facility] [address] [facility] [address]    Us-ruq    Result Date: 10/1/2019  10/1/2019 5:49 PM HISTORY/REASON FOR EXAM:  Edema Abdominal pain TECHNIQUE/EXAM DESCRIPTION AND NUMBER OF  VIEWS:  Real-time sonography of the liver and biliary tree. COMPARISON: None FINDINGS: The liver is normal in contour. There is no evidence of solid mass lesion. The liver measures 20.15 cm. The gallbladder is surgically absent. The common duct measures 0.44 cm. The pancreas is obscured by bowel gas. The visualized aorta is normal in caliber. Intrahepatic IVC is patent. The portal vein is patent with hepatopetal flow. The MPV measures 1.57 cm. The right kidney measures 11.06 cm. There is no hydronephrosis. There is no ascites.     Hepatomegaly. Status post cholecystectomy. Dilated portal vein. Status post cholecystectomy. No biliary ductal dilatation. Limited evaluation of the pancreas which is obscured.    Mr-pelvis W/o    Result Date: 10/6/2019  10/6/2019 11:08 AM HISTORY/REASON FOR EXAM:  Left upper leg edema and redness TECHNIQUE/EXAM DESCRIPTION: MRI of the LEFT femur and pelvis without. The study was performed on a [employer] Signa 1.5 Mae MRI scanner. T1 sagittal, T1 coronal, T1 axial, T2 fat-suppressed axial, T1 postcontrast fat-suppressed axial, and fast spin-echo inversion recovery sagittal images were obtained of the femur. Additional axial and coronal T1 and T2 fat-sat or STIR images obtained of the pelvis. COMPARISON: CT pelvis 9/28/2018 FINDINGS: Exam is limited secondary to significant patient motion. There is edema in the sacrum bilaterally, likely related to subacute stress fracture. There is subtle sclerosis visualized on CT. Additionally, there is a comminuted fracture of the left pubic bone which appears subacute to chronic. There is associated increased T2 signal and confluent low T1 signal. Low T1 signal extends towards the acetabulum. The fluid within the abductor minimus muscle appears to communicate with high T2 signal extending into the left inferior pubic ramus (image 28, series 7 of the  pelvic MRI) and superior pubic ramus (image 21, series 4 of the pelvic MRI). There is osseous expansion  and probable cortical destruction, best seen on the T1 coronal sequence of the pelvis. No pubic symphysis or SI joint widening is identified. No abnormal marrow signal is appreciated in the visualized femur. There is edema and enlargement of the muscles of the medial compartment and pectineus muscle. Low signal within the abductor muscles is consistent with calcification seen on recent CT. There is fluid within the fibers of the adductor longus muscle. There  is significant edema in the subcutaneous tissues. There is an additional fluid collection within the pubococcygeus muscle on the left. No significant hip effusion is identified. Limited visualization of the visceral pelvis is grossly unremarkable.     1.  Limited exam secondary to significant patient motion. 2.  Subacute fracture involving the left pubic bone with osteomyelitis and probable intraosseous abscess. 3.  Extensive edema and enlargement of the muscles of the medial compartment, pectineus muscle, and pubococcygeus muscle is consistent with known intramuscular abscesses. Fluid collection is most extensive in the adductor longus muscle. 4.  Subacute fractures of the sacrum Comment: Results given to Dr. Varma through [place] text at 3:56 PM    Us-extremity Non Vascular Unilateral Right    Result Date: 10/7/2019  10/7/2019 2:30 PM HISTORY/REASON FOR EXAM:  Evaluate right knee for effusion. Intramuscular abscesses TECHNIQUE/EXAM DESCRIPTION AND NUMBER OF VIEWS: Sonographic images of the left knee. COMPARISON: MRI femur 10/6/2019 FINDINGS: There is a complex hypoechoic collection in the medial posterior left knee measuring approximately 3.6 x 3.1 x 2 cm.     Loculated collection in the posteromedial left knee likely represents a Baker's cyst. No significant knee effusion is identified in the upper knee joint on recent MRI.    Ir-picc Line Placement W/ Guidance > Age 5    Result Date: 10/8/2019  HISTORY/REASON FOR EXAM:   PICC placement. TECHNIQUE/EXAM  DESCRIPTION AND NUMBER OF VIEWS:   PICC line insertion with ultrasound guidance.  The procedure was performed using maximal sterile barrier technique including sterile gown, mask, cap, and donning of sterile gloves following appropriate hand hygiene and/or sterile scrub. Patient skin site was prepped with 2% Chlorhexidine solution. FINDINGS:  PICC line insertion with Ultrasound Guidance was performed by qualified nursing staff without the assistance of a Radiologist. PICC positioning appropriateness confirmed by 3CG technology; chest xray only needed in the instance 3CG unable to confirm placement.              Ultrasound-guided PICC placement performed by qualified nursing staff as above.       Micro:      Assessment:  Active Hospital Problems    Diagnosis   • *Pubic ramus fracture ([facility]) [S32.599A]   • Essential hypertension [I10]   • Rheumatoid arthritis ([facility]) [M06.9]   • Chronic pain of right knee [M25.561, G89.29]   • Osteomyelitis ([facility]) [M86.9]   • Candida rash of groin [B37.89]   • Abscess of left thigh [L02.416]   • Sepsis ([facility]) [A41.9]   • Bilateral leg edema [R60.0]   • Hematoma of left thigh [S70.12XA]   • Iron deficiency anemia, unspecified iron deficiency anemia type [D50.9]     Interval 24 hour assessment:      AF  Labs reviewed  Micro reviewed    Pt continued on cefazolin 2 g every 8  Hours.  He has some ongoing left abdominal and hip pain.  Patient with constipation as well.  Reports rash in groin and on chest is markedly improved.    Plan:  Osteomyelitis of the left pubic bone(noted on MRI)  Status post recent fall found to have subacute fracture on presentation  ESR - 55  Anticipate 6 weeks of IV antibiotics with a stop date of 11/19/2019  Continue antibiotics per below  Status post I&D on 10/9/2019 by Dr. Olivares. Thickened periosteum and soft bone was encountered intra-op  Some leakage around the IR drain site per patient and dressing is damp. Recommend IR re-evaluation of the drain  OR pathology  consistent with osteomyelitis, left pubis tissue culture from 10/9 with staph aureus     Abscess of left thigh, improved  No fevers  Persistent leukocytosis, now resolved  Sustained recent fall 1 month prior to admission  Blood cultures-negative to date  Images reviewed. Initial CT with 2 complex abscesses measuring 8.6cm and 7.2cm respectively in the longest dimension  Status post CT-guided drainage on 10/2  Repeat CT on 10/7-interval decrease in the left pelvis and thigh intramuscular abscess - difficult to measure given complex nature  Cultures+ MSSA.  Gram stain after discussion with micro lab was corrected to GPCs alone  Changed IV Unasyn to IV Ancef 2 g every 8 hours on 10/12  Stop date 11/19/2019 as above  Weekly CBC with differential, CMP while on IV antibiotics  Further surgical management performed on 10/9 as above     Pityriasis versicolor, intertrigo  Light brown macular rash on chest and trunk with some scaling with pressure, some coalescing into patches, non-pruritic  Has been ongoing for about 2 weeks, similar presentation a month ago  Received 10 days of fluconazole  Patient is on miconazole cream for intertrigo. Recommend application to chest as well -  started 10/11. Will require 2-4 weeks of treatment-improving with topical therapy     Rheumatoid arthritis  On Humira prior to admission  Chronically immunosuppressed     Chronic right knee pain  Plan for future total right knee arthroplasty     Recent fall  Approximately 1 month prior to admission  Imaging on presentation revealed subacute fracture of the left pubic bone     Disposition: TBD, likely rehab soon        ID will follow.  Please call with questions.

## 2019-10-14 NOTE — PROGRESS NOTES
Hospital Medicine Daily Progress Note    Date of Service  10/14/2019    Chief Complaint  44 y.o. male admitted 9/27/2019 with left thigh pain    Hospital Course    44 y.o. male who presented to the emergency department on 9/27/2019 for evaluation of worsening left leg pain.  Patient had a fall about a month ago.  Patient has a problem with his right knee, on schedule for total knee replacement and ever since walking with a cane or walker. He had increasing pain in the left thigh and CT scan showed a left pubic ramus fracture with two large hematomas. Orthopedic surgery did see the patient and recommends no surgical intervention.  The patient had a rising white cell count and increasing pain so hematoma drainage was done by interventional radiology and tiffanie pus was drained consistent with an abscess. Cultures grew MSSA and drain was left in place.      Interval Problem Update    10/12  Overnight patient was having BRAXTON drainage leakage, no acute issues currently,  Still having 5/10 pain in left thigh area especially during movement, but overall says his pain is better controlled than prior days. Denies fevers/chills   PT/OT today.    10/13  Patient overall says pain is manageable, usually 4-6/10 at times, now what bothers him more are his joint due to RA. But says his joint pains are restricting his mobility. Otherwise in good spirits. Patient denies fevers/chills, chest pain, shortness of breath or nausea/vommiting.     10/14  Patient says overall his pain is well controlled on MS contin and oxycodone for breakthrough, we are limiting IV morphine as patient is being prepared for discharged. His BRAXTON drain also came out accidentally, having small yellow drainage. Denies fevers/chills.    Consultants/Specialty  Orthopedic surgery Dr. Olivares  Radiology Dr. Olguin    Code Status  Full Code    Disposition  TBD    Review of Systems  Review of Systems   Constitutional: Negative for chills, fever and malaise/fatigue.    HENT: Negative for congestion, hearing loss, sore throat and tinnitus.    Eyes: Negative for blurred vision, double vision, photophobia and pain.   Respiratory: Negative for cough, hemoptysis, sputum production, shortness of breath and stridor.    Cardiovascular: Negative for chest pain, palpitations, orthopnea, claudication, leg swelling and PND.   Gastrointestinal: Negative for abdominal pain, blood in stool, constipation, heartburn, melena, nausea and vomiting.   Genitourinary: Negative for dysuria, frequency and urgency.   Musculoskeletal: Positive for joint pain (mildly improved) and myalgias (less overall). Negative for back pain and neck pain.        Left thigh pain   Skin: Negative for itching and rash (minima).        Mild skin peeling over his left thigh area   Neurological: Negative for dizziness, tingling, tremors, sensory change, speech change, weakness and headaches.   Psychiatric/Behavioral: Negative for depression, memory loss and suicidal ideas. The patient is not nervous/anxious.    All other systems reviewed and are negative.       Physical Exam  Temp:  [36.6 °C (97.9 °F)-37 °C (98.6 °F)] 36.6 °C (97.9 °F)  Pulse:  [] 100  Resp:  [16-18] 18  BP: (103-118)/(73-79) 103/75  SpO2:  [95 %-100 %] 99 %    Physical Exam   Constitutional: He is oriented to person, place, and time. He appears well-developed and well-nourished. No distress.   Tired appearing   HENT:   Head: Normocephalic and atraumatic.   Mouth/Throat: No oropharyngeal exudate.   Eyes: Pupils are equal, round, and reactive to light. Conjunctivae and EOM are normal. Right eye exhibits no discharge. No scleral icterus.   Neck: Neck supple. No JVD present. No tracheal deviation present. No thyromegaly present.   Cardiovascular: Normal rate, regular rhythm and intact distal pulses.   No murmur heard.  Pulses:       Dorsalis pedis pulses are 2+ on the right side, and 2+ on the left side.   Cap refill < 3 s   Pulmonary/Chest: Effort normal  and breath sounds normal. No stridor. No respiratory distress. He has no wheezes. He has no rales.   Abdominal: Soft. Bowel sounds are normal. He exhibits no distension. There is no tenderness. There is no rebound.   Musculoskeletal: Normal range of motion. He exhibits tenderness. He exhibits no edema.   Left thigh tenderness unchanged. No erythema noted, there is still serous drainage from BRAXTON drain.       Neurological: He is alert and oriented to person, place, and time. No cranial nerve deficit. Coordination normal.   Difficulty bearing weight on left leg due to pain   Skin: Skin is warm and dry. No rash noted. He is not diaphoretic. No erythema. No pallor.   Macular rash over trunk near resolved     Psychiatric: He has a normal mood and affect. His behavior is normal. Thought content normal.   Nursing note and vitals reviewed.      Fluids    Intake/Output Summary (Last 24 hours) at 10/14/2019 0709  Last data filed at 10/14/2019 0500  Gross per 24 hour   Intake 1680 ml   Output 2800 ml   Net -1120 ml       Laboratory  Recent Labs     10/12/19  0545 10/13/19  0400 10/14/19  0411   WBC 6.2 5.1 5.4   RBC 3.06* 3.18* 3.31*   HEMOGLOBIN 8.3* 8.3* 8.9*   HEMATOCRIT 27.1* 28.0* 29.4*   MCV 88.6 88.1 88.8   MCH 27.1 26.1* 26.9*   MCHC 30.6* 29.6* 30.3*   RDW 65.3* 63.7* 64.4*   PLATELETCT 338 356 377   MPV 8.6* 8.6* 8.7*     Recent Labs     10/12/19  0545 10/13/19  0400 10/14/19  0411   SODIUM 134* 139 139   POTASSIUM 4.1 4.3 4.3   CHLORIDE 97 101 103   CO2 27 27 25   GLUCOSE 99 92 102*   BUN 13 8 10   CREATININE 0.58 0.56 0.78   CALCIUM 8.5 8.7 8.8                   Imaging  IR-PICC LINE PLACEMENT W/ GUIDANCE > AGE 5   Final Result                  Ultrasound-guided PICC placement performed by qualified nursing staff as    above.          CT-ABDOMEN-PELVIS WITH   Final Result      1.  Interval decrease in the intramuscular abscesses involving the left pelvis and thigh following drain placement. Collection in the  pubococcygeus muscle is similar in size to the prior exam.      2.  Ventral hernia containing anterior bowel wall and fat. No secondary obstruction.      US-EXTREMITY NON VASCULAR UNILATERAL RIGHT   Final Result      Loculated collection in the posteromedial left knee likely represents a Baker's cyst. No significant knee effusion is identified in the upper knee joint on recent MRI.      MR-FEMUR-W/O LEFT   Final Result      1.  Limited exam secondary to significant patient motion.   2.  Subacute fracture involving the left pubic bone with osteomyelitis and probable intraosseous abscess.   3.  Extensive edema and enlargement of the muscles of the medial compartment, pectineus muscle, and pubococcygeus muscle is consistent with known intramuscular abscesses. Fluid collection is most extensive in the adductor longus muscle.   4.  Subacute fractures of the sacrum               Comment: Results given to Dr. Varma through [place] text at 3:56 PM      MR-PELVIS W/O   Final Result      1.  Limited exam secondary to significant patient motion.   2.  Subacute fracture involving the left pubic bone with osteomyelitis and probable intraosseous abscess.   3.  Extensive edema and enlargement of the muscles of the medial compartment, pectineus muscle, and pubococcygeus muscle is consistent with known intramuscular abscesses. Fluid collection is most extensive in the adductor longus muscle.   4.  Subacute fractures of the sacrum               Comment: Results given to Dr. Varma through [place] text at 3:56 PM      CT-DRAIN-HEMATOMA - SEROMA   Final Result      1.  Left inguinal region muscular abscess.   2.  CT-guided drainage and catheter placement of large collection in the left obturator externus.   3.  CT-guided aspiration of smaller collection in the left pectineus muscle.      US-RUQ   Final Result      Hepatomegaly.      Status post cholecystectomy.      Dilated portal vein.      Status post cholecystectomy.      No biliary  ductal dilatation.      Limited evaluation of the pancreas which is obscured.      MR-THORACIC SPINE-W/O   Final Result      1.  No acute fracture in the thoracic spine.   2.  Mild chronic compression deformity at T11.      MR-LUMBAR SPINE-W/O   Final Result      1.  Acute bilateral sacral alae fractures.   2.  There is edema in the lower posterior paraspinal soft tissue likely representing soft tissue contusion.   3.  There is no lumbar spine fractures.         CT-LSPINE W/O PLUS RECONS   Final Result      No acute fracture or traumatic malalignment.      CT-PELVIS WITH   Final Result      1.  Recent but probably subacute fracture of the left superior pubic ramus near the symphysis. Adjacent and just lateral to this are two complex masslike structures, the largest and most medial of which contains a fluid-filled cavity. These are    consistent with hematomata, possibly intramuscular.      US-EXTREMITY VENOUS LOWER UNILAT LEFT   Final Result      1.  No evidence of Left  lower extremity deep venous thrombosis.   2.  Two complex areas within the left groin, consistent with hematomata.            INTERPRETING LOCATION: [address] [facility], [place], [facility]           Assessment/Plan  * Pubic ramus fracture (HCC)  Assessment & Plan  S/p mechanical GLF  MS Continue 30mg TID now, with oxycodone for breakthrough  D/c IV pain meds  PT/OT    Osteomyelitis (HCC)  Assessment & Plan  MRI femus shows Subacute fracture involving the left pubic bone with osteomyelitis and probable intraosseous abscess. Extensive edema and enlargement of the muscles of the medial compartment, pectineus muscle, and pubococcygeus muscle is consistent with known intramuscular abscesses. Fluid collection is most extensive in the adductor longus muscle.  ID following  Continue IV Ancef 11/19/19 as stop date.   S/p I&D and bone biopsy 10/9, cultures show MSSA      Candida rash of groin  Assessment & Plan  Resolved.  Resume Diflucan cream.    Sepsis  (HCC)  Assessment & Plan  This is Sepsis Not present on admission  SIRS criteria identified on my evaluation include: Tachycardia, with heart rate greater than 90 BPM and Leukocyosis, with WBC greater than 12,000  Source is left thigh abscess  Leukocytosis resolved  Sepsis has resolved.      Abscess of left thigh  Assessment & Plan  Multiple abscesses found on imaging CT/MRI  Cultures +MSSA ,GPC.  Continue with IV Ancef Stop Date 11/19/2019  As above ID following.     Hematoma of left thigh  Assessment & Plan  2/2 to GLF  S/o I&D  Resolving.       Bilateral leg edema  Assessment & Plan  Stable overall    Iron deficiency anemia, unspecified iron deficiency anemia type- (present on admission)  Assessment & Plan  Stable, last admit he received IV iron.  No signs of gross bleeding.   Resume nutritional support        Essential hypertension- (present on admission)  Assessment & Plan  Well Controlled overall.   Resume Coreg, Losartan and lasix.     Chronic pain of right knee- (present on admission)  Assessment & Plan  Chronically on MS contin plus short acting oxycodone for breakthrough pain  As above.   Current combination appears to be working per patient       Rheumatoid arthritis (HCC)- (present on admission)  Assessment & Plan  Continue Humira as outpatient once infection has resolved.  Unfortunately at the time of acute infection, further immunosuppression would be contraindicated. Patient also reached out to  and response was the same.      Patient plan of care discussed at multidisplinary team rounds and with patient and R.N at beside.      VTE prophylaxis: lovenox

## 2019-10-14 NOTE — DISCHARGE PLANNING
Anticipated Discharge Disposition: Acute Rehab      Action: LSW received Rehab choice from the Pt 1. [facility]     Barriers to Discharge: Rehab acceptance and medical clearance     Plan: Follow up with medical team and Rehab referral

## 2019-10-14 NOTE — PROGRESS NOTES
Report from JENNIFER Mcgrath RN. Pt states did not want to be bothered during report.    PT/OT in to see pt. Pt up and ambulating in room with walker. Pt medicated for pain- see MAR for administration.     Pending acceptance to Rehab. Last dose morphine IV was 10/13 0764

## 2019-10-14 NOTE — DISCHARGE PLANNING
Follow up for rehab continues to require IV morphine for pain control. Will follow for tolerance to therapy intervention and pain management.

## 2019-10-14 NOTE — DISCHARGE PLANNING
Received Choice form at 1235  Agency/Facility Name: [facility]   Referral sent per Choice form at 1247

## 2019-10-14 NOTE — CARE PLAN
Problem: Pain Management  Goal: Pain level will decrease to patient's comfort goal  Outcome: PROGRESSING AS EXPECTED     Problem: Mobility  Goal: Risk for activity intolerance will decrease  Outcome: PROGRESSING AS EXPECTED     Pt continues to work with PT/OT. Sitting up in chair most of the day. Pt no longer using morphine IV.

## 2019-10-14 NOTE — THERAPY
"Physical Therapy treatment completed.   Bed Mobility:  Supine to Sit: (NT, pt standing upon entry)  Transfers: Sit to Stand: Minimal Assist(close guarding for safety)  Gait: Level Of Assist: Minimal Assist with Front-Wheel Walker x 20 ft, increased use of B UE for support due to R knee pain > L groin pain      Plan of Care: Will benefit from Physical Therapy 5 times per week  Discharge Recommendations: Equipment: Will Continue to Assess for Equipment Needs. Post-acute therapy: Recommend post-acute placement for continued physical therapy services prior to discharge home. Patient can tolerate post-acute therapies at a 5x/week frequency.       Pt motivated to participate and states feels \"100 times\" better than when he was admitted.  Given prolonged hospital stay pt has overall gotten weaker with increased effort to ambulate short distances, now also limited by R knee RA pain (unable to take meds to control RA) and is awaiting a R TKA with Dr. Simmons.  Pt reports R knee pain is more limiting than L groin pain at this time. Will continue with acute PT to improve strength and mobility. Pt is young and determined to go home to supportive family and therefore feel will be a good rehab candidate.    See \"Rehab Therapy-Acute\" Patient Summary Report for complete documentation.     "

## 2019-10-14 NOTE — CARE PLAN
Problem: Communication  Goal: The ability to communicate needs accurately and effectively will improve  Outcome: PROGRESSING AS EXPECTED  Intervention: [place] patient and significant other/support system to call light to alert staff of needs  Note:   Pt calls appropriately, makes needs known     Problem: Safety  Goal: Will remain free from injury  Outcome: PROGRESSING AS EXPECTED  Goal: Will remain free from falls  Outcome: PROGRESSING AS EXPECTED     Problem: Infection  Goal: Will remain free from infection  Outcome: PROGRESSING AS EXPECTED  Intervention: Assess signs and symptoms of infection  Note:   Abx therapy continued     Problem: Venous Thromboembolism (VTW)/Deep Vein Thrombosis (DVT) Prevention:  Goal: Patient will participate in Venous Thrombosis (VTE)/Deep Vein Thrombosis (DVT)Prevention Measures  Outcome: PROGRESSING AS EXPECTED  Intervention: Ensure patient wears graduated elastic stockings (MIMI hose) and/or SCDs, if ordered, when in bed or chair (Remove at least once per shift for skin check)  Flowsheets (Taken 10/14/2019 0307)  Mechanical Prophylaxis : SCDs, Sequential Compression Device  SCDs, Sequential Compression Device: On     Problem: Discharge Barriers/Planning  Goal: Patient's continuum of care needs will be met  Outcome: PROGRESSING AS EXPECTED  Intervention: Assess potential discharge barriers on admission and throughout hospital stay  Note:   Poss d/c today to [facility]     Problem: Pain Management  Goal: Pain level will decrease to patient's comfort goal  Outcome: PROGRESSING AS EXPECTED  Intervention: Follow pain managment plan developed in collaboration with patient and Interdisciplinary Team  Note:   Pain managed with current scheduled and PRN options

## 2019-10-14 NOTE — PROGRESS NOTES
Pt reports BRAXTON drain accidentally d/shae at this time. Dressing remains intact with small amount yellow serous drainage.

## 2019-10-14 NOTE — PROGRESS NOTES
"Report received from Lori CUEVAS. Pt awake, alert, appropriate and pleasant.  No drainage to BRAXTON drain. Dressing to I+D site CDI. Dressing to BRAXTON drain has small amount yellow drainage. Pt denies needs at this time. VSS. /79   Pulse 100   Temp 36.6 °C (97.9 °F) (Oral)   Resp 16   Ht 1.803 m (5' 11\")   Wt 99.6 kg (219 lb 9.3 oz)   SpO2 100%   BMI 30.62 kg/m²  Pain controlled. Call light in reach. Will continue to monitor.   "

## 2019-10-14 NOTE — THERAPY
"Occupational Therapy Treatment completed with focus on ADLs, ADL transfers, patient education and upper extremity function.  Functional Status:  Pt agreeable to therapy.  MaxA socks in bed (does better with a sockaide).  Supine to sit supervised with HOB elevated to approx 60 degrees.  Pt needed to use BUE support to move LLE out of bed.  Seated EOB., Lisa gown change.  Sit to stand from EOB with Lisa (close CGA).  Lisa to walk to sink with FWW, slow magdalena, difficulty clearing feet 2/2 pain in LLE (groin) and R knee (needing knee replacement).  Able to tolerate standing supervised 5 min at sink for grooming, leaning over sink with arms propped on countertop.  Pt heavily reliant on BUE for support with mobility.  Pt able to walk 4-5 feet beyond the sink before needing to turn around and head back towards bedside chair.  Overall, pt tolerating activity better than he has in the past, still remains limited by pain and generalized weakness.  NOT safe to return home at this time- does not have adequate social support as wife works full time and there are 2 small children in the home. Pt is highly motivated, and will greatly benefit from further inpt intensive therapy intervention (Rehab level) prior to home. OT will continue while in house.      Plan of Care: Will benefit from Occupational Therapy 3 times per week  Discharge Recommendations:  Equipment Will Continue to Assess for Equipment Needs and LB dressing equipment. Post-acute therapy Discharge to a transitional care facility for continued skilled therapy services.    See \"Rehab Therapy-Acute\" Patient Summary Report for complete documentation.   "

## 2019-10-15 LAB
ANION GAP SERPL CALC-SCNC: 11 MMOL/L (ref 0–11.9)
BASOPHILS # BLD AUTO: 1.3 % (ref 0–1.8)
BASOPHILS # BLD: 0.07 K/UL (ref 0–0.12)
BUN SERPL-MCNC: 9 MG/DL (ref 8–22)
CALCIUM SERPL-MCNC: 8.9 MG/DL (ref 8.4–10.2)
CHLORIDE SERPL-SCNC: 102 MMOL/L (ref 96–112)
CO2 SERPL-SCNC: 26 MMOL/L (ref 20–33)
CREAT SERPL-MCNC: 0.55 MG/DL (ref 0.5–1.4)
EOSINOPHIL # BLD AUTO: 0.1 K/UL (ref 0–0.51)
EOSINOPHIL NFR BLD: 1.9 % (ref 0–6.9)
ERYTHROCYTE [DISTWIDTH] IN BLOOD BY AUTOMATED COUNT: 62.2 FL (ref 35.9–50)
GLUCOSE SERPL-MCNC: 89 MG/DL (ref 65–99)
HCT VFR BLD AUTO: 29 % (ref 42–52)
HGB BLD-MCNC: 8.8 G/DL (ref 14–18)
IMM GRANULOCYTES # BLD AUTO: 0.05 K/UL (ref 0–0.11)
IMM GRANULOCYTES NFR BLD AUTO: 0.9 % (ref 0–0.9)
LYMPHOCYTES # BLD AUTO: 1.54 K/UL (ref 1–4.8)
LYMPHOCYTES NFR BLD: 29.1 % (ref 22–41)
MCH RBC QN AUTO: 26.6 PG (ref 27–33)
MCHC RBC AUTO-ENTMCNC: 30.3 G/DL (ref 33.7–35.3)
MCV RBC AUTO: 87.6 FL (ref 81.4–97.8)
MONOCYTES # BLD AUTO: 0.49 K/UL (ref 0–0.85)
MONOCYTES NFR BLD AUTO: 9.3 % (ref 0–13.4)
NEUTROPHILS # BLD AUTO: 3.04 K/UL (ref 1.82–7.42)
NEUTROPHILS NFR BLD: 57.5 % (ref 44–72)
NRBC # BLD AUTO: 0 K/UL
NRBC BLD-RTO: 0 /100 WBC
PLATELET # BLD AUTO: 339 K/UL (ref 164–446)
PMV BLD AUTO: 8.6 FL (ref 9–12.9)
POTASSIUM SERPL-SCNC: 4.4 MMOL/L (ref 3.6–5.5)
RBC # BLD AUTO: 3.31 M/UL (ref 4.7–6.1)
SODIUM SERPL-SCNC: 139 MMOL/L (ref 135–145)
WBC # BLD AUTO: 5.3 K/UL (ref 4.8–10.8)

## 2019-10-15 PROCEDURE — 36415 COLL VENOUS BLD VENIPUNCTURE: CPT

## 2019-10-15 PROCEDURE — 700102 HCHG RX REV CODE 250 W/ 637 OVERRIDE(OP): Performed by: INTERNAL MEDICINE

## 2019-10-15 PROCEDURE — 99254 IP/OBS CNSLTJ NEW/EST MOD 60: CPT | Performed by: PHYSICAL MEDICINE & REHABILITATION

## 2019-10-15 PROCEDURE — A9270 NON-COVERED ITEM OR SERVICE: HCPCS | Performed by: HOSPITALIST

## 2019-10-15 PROCEDURE — A9270 NON-COVERED ITEM OR SERVICE: HCPCS | Performed by: INTERNAL MEDICINE

## 2019-10-15 PROCEDURE — 700102 HCHG RX REV CODE 250 W/ 637 OVERRIDE(OP): Performed by: HOSPITALIST

## 2019-10-15 PROCEDURE — 97110 THERAPEUTIC EXERCISES: CPT

## 2019-10-15 PROCEDURE — 770001 HCHG ROOM/CARE - MED/SURG/GYN PRIV*

## 2019-10-15 PROCEDURE — 700105 HCHG RX REV CODE 258: Performed by: INTERNAL MEDICINE

## 2019-10-15 PROCEDURE — 97530 THERAPEUTIC ACTIVITIES: CPT

## 2019-10-15 PROCEDURE — 700111 HCHG RX REV CODE 636 W/ 250 OVERRIDE (IP): Performed by: INTERNAL MEDICINE

## 2019-10-15 PROCEDURE — 99232 SBSQ HOSP IP/OBS MODERATE 35: CPT | Performed by: INTERNAL MEDICINE

## 2019-10-15 PROCEDURE — 85025 COMPLETE CBC W/AUTO DIFF WBC: CPT

## 2019-10-15 PROCEDURE — 700111 HCHG RX REV CODE 636 W/ 250 OVERRIDE (IP): Performed by: HOSPITALIST

## 2019-10-15 PROCEDURE — 80048 BASIC METABOLIC PNL TOTAL CA: CPT

## 2019-10-15 RX ORDER — ONDANSETRON 2 MG/ML
4 INJECTION INTRAMUSCULAR; INTRAVENOUS EVERY 6 HOURS PRN
Status: DISCONTINUED | OUTPATIENT
Start: 2019-10-15 | End: 2019-10-17 | Stop reason: HOSPADM

## 2019-10-15 RX ADMIN — MORPHINE SULFATE 30 MG: 30 TABLET, EXTENDED RELEASE ORAL at 12:09

## 2019-10-15 RX ADMIN — OMEPRAZOLE 40 MG: 20 CAPSULE, DELAYED RELEASE ORAL at 05:34

## 2019-10-15 RX ADMIN — OXYCODONE HYDROCHLORIDE 10 MG: 10 TABLET ORAL at 07:50

## 2019-10-15 RX ADMIN — OXYCODONE HYDROCHLORIDE 10 MG: 10 TABLET ORAL at 18:16

## 2019-10-15 RX ADMIN — VITAMIN D, TAB 1000IU (100/BT) 1000 UNITS: 25 TAB at 05:35

## 2019-10-15 RX ADMIN — FERROUS SULFATE TAB 325 MG (65 MG ELEMENTAL FE) 325 MG: 325 (65 FE) TAB at 05:35

## 2019-10-15 RX ADMIN — CALCIUM CITRATE 200 MG (950 MG) TABLET 950 MG: at 05:35

## 2019-10-15 RX ADMIN — PREGABALIN 25 MG: 25 CAPSULE ORAL at 12:09

## 2019-10-15 RX ADMIN — ONDANSETRON 4 MG: 2 INJECTION INTRAMUSCULAR; INTRAVENOUS at 22:51

## 2019-10-15 RX ADMIN — SENNOSIDES, DOCUSATE SODIUM 2 TABLET: 50; 8.6 TABLET, FILM COATED ORAL at 18:08

## 2019-10-15 RX ADMIN — OMEPRAZOLE 40 MG: 20 CAPSULE, DELAYED RELEASE ORAL at 18:08

## 2019-10-15 RX ADMIN — NICOTINE 14 MG: 14 PATCH, EXTENDED RELEASE TRANSDERMAL at 05:34

## 2019-10-15 RX ADMIN — CEFAZOLIN 2 G: 10 INJECTION, POWDER, FOR SOLUTION INTRAVENOUS at 22:06

## 2019-10-15 RX ADMIN — MORPHINE SULFATE 30 MG: 30 TABLET, EXTENDED RELEASE ORAL at 18:09

## 2019-10-15 RX ADMIN — PREGABALIN 25 MG: 25 CAPSULE ORAL at 05:34

## 2019-10-15 RX ADMIN — MORPHINE SULFATE 30 MG: 30 TABLET, EXTENDED RELEASE ORAL at 05:34

## 2019-10-15 RX ADMIN — OXYCODONE HYDROCHLORIDE 10 MG: 10 TABLET ORAL at 22:51

## 2019-10-15 RX ADMIN — OXYCODONE HYDROCHLORIDE 10 MG: 10 TABLET ORAL at 13:25

## 2019-10-15 RX ADMIN — CEFAZOLIN 2 G: 10 INJECTION, POWDER, FOR SOLUTION INTRAVENOUS at 16:14

## 2019-10-15 RX ADMIN — PREGABALIN 25 MG: 25 CAPSULE ORAL at 18:09

## 2019-10-15 RX ADMIN — ENOXAPARIN SODIUM 40 MG: 100 INJECTION SUBCUTANEOUS at 05:35

## 2019-10-15 RX ADMIN — LOSARTAN POTASSIUM 25 MG: 25 TABLET, FILM COATED ORAL at 05:35

## 2019-10-15 RX ADMIN — CEFAZOLIN 2 G: 10 INJECTION, POWDER, FOR SOLUTION INTRAVENOUS at 07:50

## 2019-10-15 RX ADMIN — MICONAZOLE NITRATE: 20 CREAM TOPICAL at 05:35

## 2019-10-15 RX ADMIN — MICONAZOLE NITRATE: 20 CREAM TOPICAL at 18:08

## 2019-10-15 RX ADMIN — SENNOSIDES, DOCUSATE SODIUM 2 TABLET: 50; 8.6 TABLET, FILM COATED ORAL at 05:35

## 2019-10-15 ASSESSMENT — ENCOUNTER SYMPTOMS
WHEEZING: 0
CONSTIPATION: 1
SORE THROAT: 0
ABDOMINAL PAIN: 1
DIZZINESS: 0
SINUS PAIN: 0
DIARRHEA: 0
FEVER: 0
MYALGIAS: 1
ABDOMINAL PAIN: 0
DIAPHORESIS: 0
CONSTIPATION: 0
CHILLS: 0
HEADACHES: 0
BACK PAIN: 0
NAUSEA: 0
SHORTNESS OF BREATH: 0
HEADACHES: 1
TREMORS: 0
COUGH: 0
VOMITING: 0
PALPITATIONS: 0

## 2019-10-15 ASSESSMENT — GAIT ASSESSMENTS: GAIT LEVEL OF ASSIST: REFUSED

## 2019-10-15 NOTE — THERAPY
"Physical Therapy treatment completed.   Bed Mobility:  Supine to Sit: Minimal Assist  Transfers: Sit to Stand: Minimal Assist(close guarding for safety)  Gait: Level Of Assist: Refused(just returned from taking a shower and is too fatigued) with Front-Wheel Walker       Plan of Care: Will benefit from Physical Therapy 5 times per week  Discharge Recommendations: Equipment: Will Continue to Assess for Equipment Needs. Post-acute therapy Discharge to a transitional care facility for continued skilled therapy services.    Pt fatigued after shower and walk to/from bathroom ~30 min ago but willing to try working with PT. Pt fatigued quickly with standing, not up for sitting up in chair or further ambulation. R knee pain limiting standing tolerance today as well.    See \"Rehab Therapy-Acute\" Patient Summary Report for complete documentation.     "

## 2019-10-15 NOTE — DISCHARGE PLANNING
"LS[facility]/ [facility] who said that IV ABX were a barrier to pt's acceptance and Malachi asked if he could be switched to oral.  LSW followed up w/ ID Dr. Goldman who responded via tiger text that \"oral abx are an option\".  LSW left a msg with Malachi with the response.    LSW to follow [facility] and ID Dr. Goldman.  "

## 2019-10-15 NOTE — DISCHARGE PLANNING
Spoke with CM about potential transition to IRF level of care discussed potential barriers Discussed with admission team Dr. Khan will try to do a face to face with the patient today. CM notified.

## 2019-10-15 NOTE — PROGRESS NOTES
Infectious Disease Progress Note    Author: Jessica Goldman M.D. Date & Time of service: 10/15/2019  8:17 AM    Chief Complaint:  Follow-up for left thigh abscess/pelvic OM     Interval History:  44 y.o. man with a history of rheumatoid arthritis on Humira, chronic right knee pain with plans for total right knee arthroplasty, and recent fall sustained 1 month prior to admission admitted for worsening left thigh/groin pain.  Patient found to have multiple fluid collections on imaging which were concerning for an abscess.  He underwent CT-guided drainage on 10/2 with cultures growing MSSA.     10/6 afebrile WBC 12.7 ongoing left groin pain however the crampy pain is somewhat decreased and he is able to ambulate better.  Plan for MRI of the pelvis and femur today  10/7 afebrile WBC 11.4 patient is very anxious after hearing about results of MRI.  MRI of the pelvis reveals osteomyelitis.  Awaiting surgical evaluation for possible surgical debridement.  Patient states left thigh pain remains unchanged and requiring multiple pain medications.  Pain worse with standing  10/8 afebrile WBC 8 patient is agreeable to surgery.  Repeat CT scan shows interval decrease and left thigh abscess.  Minimal output in drain.  Waiting for PICC line  10/9 AF WBC 6.6 plan for surgery today, also has flare up of rash on chest associated with itching  10/10 afebrile WBC 6.4 patient is complaining of significant postop pain.  He underwent debridement yesterday.  He is also complaining of constipation and has not had any bowel movement since Monday.  Wife at the bedside states that surgeon mentioned bone looked infected  10/11 T max 99.1. White count 6.7. Tolerating antibiotics. Pain at surgical site   10/12 afebrile, white count 6.2.  Tolerating antibiotics.  Pathology consistent with osteomyelitis.  OR cultures no growth till date.  Trying to wean off IV pain meds so he can go to rehab.  10/13 afebrile, white count 5.1.  Notes that there  is leakage around his IR drain site, feels that it may have been pulled out some, and the dressing is wet.  Continues to have pain.   10/14   10/15 afebrile.  Patient feels poorly but overall improved since admission.  He also notes a tender area along his incision    Review of Systems:  Review of Systems   Constitutional: Negative for chills, fever and malaise/fatigue.   Gastrointestinal: Positive for abdominal pain and constipation. Negative for diarrhea, nausea and vomiting.   Musculoskeletal: Positive for joint pain and myalgias.   Neurological: Negative for dizziness and headaches.   All other systems reviewed and are negative.      Hemodynamics:  Temp (24hrs), Av.6 °C (97.8 °F), Min:36.3 °C (97.4 °F), Max:36.8 °C (98.2 °F)  Temperature: 36.8 °C (98.2 °F)  Pulse  Av.5  Min: 50  Max: 105   Blood Pressure: 109/67       Physical Exam:  Physical Exam   Constitutional: He is oriented to person, place, and time. He appears well-developed and well-nourished.   HENT:   Head: Normocephalic and atraumatic.   Eyes: Pupils are equal, round, and reactive to light. Conjunctivae and EOM are normal.   Neck: Neck supple.   Cardiovascular: Normal rate, regular rhythm and normal heart sounds.   Pulmonary/Chest: Effort normal and breath sounds normal.   Abdominal: Soft. Bowel sounds are normal. He exhibits no distension. There is no tenderness. There is no rebound and no guarding.   Prior drain site without any drainage.    Lower pelvic surgical site with area of induration. +TTP   Musculoskeletal: He exhibits edema.   Right knee.      Left upper extremity PICC line-nontender, no surrounding erythema   Neurological: He is alert and oriented to person, place, and time.   Skin: Rash noted.   Mild erythematous rash in groin, patchy, scaling macular rash on abdomen chest   Psychiatric: He has a normal mood and affect. His behavior is normal.       Meds:    Current Facility-Administered Medications:   •  morphine ER  •   ceFAZolin  •  simethicone  •  oxyCODONE immediate-release  •  enoxaparin  •  miconazole  •  vitamin D  •  calcium citrate  •  ferrous sulfate  •  furosemide  •  losartan  •  omeprazole  •  pregabalin  •  senna-docusate **AND** polyethylene glycol/lytes **AND** magnesium hydroxide **AND** bisacodyl  •  acetaminophen  •  nicotine **AND** Nicotine Replacement Patient Education Materials **AND** nicotine polacrilex  •  morphine injection    Labs:  Recent Labs     10/13/19  0400 10/14/19  0411   WBC 5.1 5.4   RBC 3.18* 3.31*   HEMOGLOBIN 8.3* 8.9*   HEMATOCRIT 28.0* 29.4*   MCV 88.1 88.8   MCH 26.1* 26.9*   RDW 63.7* 64.4*   PLATELETCT 356 377   MPV 8.6* 8.7*   NEUTSPOLYS 49.20 47.30   LYMPHOCYTES 31.60 35.80   MONOCYTES 13.40 11.40   EOSINOPHILS 2.80 2.60   BASOPHILS 1.40 1.10     Recent Labs     10/13/19  0400 10/14/19  0411   SODIUM 139 139   POTASSIUM 4.3 4.3   CHLORIDE 101 103   CO2 27 25   GLUCOSE 92 102*   BUN 8 10     Recent Labs     10/13/19  0400 10/14/19  0411   CREATININE 0.56 0.78       Imaging:  Ct-abdomen-pelvis With    Result Date: 10/7/2019  10/7/2019 4:57 PM HISTORY/REASON FOR EXAM:  Left groin pain. History of CT drain placement on 10/2/2018. TECHNIQUE/EXAM DESCRIPTION:   CT scan of the abdomen and pelvis with contrast. Contrast-enhanced helical scanning was obtained from the diaphragmatic domes through the pubic symphysis following the bolus administration of nonionic contrast without complication. 100 mL of Omnipaque 350 nonionic contrast was administered without complication. Low dose optimization technique was utilized for this CT exam including automated exposure control and adjustment of the mA and/or kV according to patient size. COMPARISON: 9/28/2019 FINDINGS: The visualized lung bases are unremarkable. CT Abdomen: The liver, spleen, adrenal glands, pancreas, and kidneys are unremarkable. The gallbladder has been removed. There are postoperative changes in the bowel scattered air-fluid levels.  A small ventral hernia superior to the umbilicus contains the anterior wall of the colon without secondary obstruction. No transition point to suggest obstruction appreciated. There  is a moderate amount of stool in the rectosigmoid colon. CT Pelvis: The bladder is unremarkable. A drain has been placed in the fluid collection within the left abductor muscles. The collection has decreased in size from the prior exam and is difficult to measure. Fluid collection in the anterior aspect of the adductor longus muscle measures approximately 5.3 x 2.8 x 2 cm, previously approximately 6.5 x 3.5 x 1.7 cm. Fluid collection within the left pubococcygeus muscle measures approximately 2.5 x 1.6 x 2.1 cm, previously 2.4 x 2.3 x 1.1 cm. There is edema in the anterior subcutaneous tissues. Fracture of the left pubic bone is again noted.     1.  Interval decrease in the intramuscular abscesses involving the left pelvis and thigh following drain placement. Collection in the pubococcygeus muscle is similar in size to the prior exam. 2.  Ventral hernia containing anterior bowel wall and fat. No secondary obstruction.    Ct-drain-hematoma - Seroma    Result Date: 10/2/2019  10/2/2019 12:14 PM HISTORY/REASON FOR EXAM:  Left inguinal fluid collection. Moderate sedation was administered with continuous monitoring of the patient under the direct supervision of  Total sedation time: 30 minutes The biopsy/drainage was done utilizing low dose optimization CT techniques including auto modulation for  imaging and low mA CT/fluoroscopy mode for the procedure. PROCEDURE: After the informed consent the patient was placed on the CT table on supine position. The skin over the left groin was prepped. Localization CT scan demonstrates fluid collection in the left obturator externus and pectineus muscles. Subsequently after injecting lidocaine needle was advanced into the larger fluid collection. Pus was aspirated. Subsequently a wire  was introduced. After dilating the tract an 8 [place] drain was introduced and placed with its loop in the fluid collection. An approximately 100 mL of fluid was aspirated. Subsequently under the needle was advanced into the superficial/pectineus muscle fluid collection. An approximately 10 mL of fluid was aspirated. No drain was placed in the superficial fluid collection. A sample of the material was sent to the microbe allergy. The patient tolerated the procedure without any immediate complication.     1.  Left inguinal region muscular abscess. 2.  CT-guided drainage and catheter placement of large collection in the left obturator externus. 3.  CT-guided aspiration of smaller collection in the left pectineus muscle.    Ct-lspine W/o Plus Recons    Result Date: 9/28/2019 9/28/2019 4:58 AM HISTORY/REASON FOR EXAM: Back pain after recent fall TECHNIQUE/EXAM DESCRIPTION: CT lumbar spine without contrast, with reconstructions. Noncontrast helical scanning was obtained from T12 through the sacrum.  Sagittal reconstructions were generated from the axial images. Low dose optimization technique was utilized for this CT exam including automated exposure control and adjustment of the mA and/or kV according to patient size. COMPARISON:  None. FINDINGS: The bones are markedly osteopenic. There is a minimal compression deformity of the superior endplate of the L2 body, which is old. There is no evidence of acute fracture. There is multilevel interspace and facet arthropathy.     No acute fracture or traumatic malalignment.    Ct-pelvis With    Result Date: 9/28/2019 9/28/2019 3:21 AM HISTORY/REASON FOR EXAM:  significant left groin pain, possible hematoma on us. TECHNIQUE/EXAM DESCRIPTION AND NUMBER OF VIEWS: CT scan of the pelvis with contrast. Contrast-enhanced helical scanning of the pelvis was obtained from the iliac crests through the pubic symphysis following the bolus administration of 100 mL of Omnipaque 350 nonionic  contrast without complication. Low dose optimization technique was utilized for this CT exam including automated exposure control and adjustment of the mA and/or kV according to patient size. COMPARISON:  None. FINDINGS: In the deep left inguinal region, there is a masslike structure containing irregular fluid-filled cavity, which measures overall about 8.6 cm in longest dimension. Within the cavity or focal areas of increased attenuation consistent with calcification. This lesion is immediately adjacent to a recent no probably subacute fracture of the left superior pubic ramus near the symphysis. Lateral to this lesion is a smaller complex mass measuring about 7.3 cm maximum dimension. There is no abnormality within the visualized pelvic cavity and there is no free intraperitoneal fluid. The bladder is intact.     1.  Recent but probably subacute fracture of the left superior pubic ramus near the symphysis. Adjacent and just lateral to this are two complex masslike structures, the largest and most medial of which contains a fluid-filled cavity. These are consistent with hematomata, possibly intramuscular.    Dx-lumbar Spine-2 Or 3 Views    Result Date: 9/16/2019 9/16/2019 4:15 PM HISTORY/REASON FOR EXAM:  Atraumatic Pain TECHNIQUE/ EXAM DESCRIPTION AND NUMBER OF VIEWS:  3 views of the lumbar spine. COMPARISON: None. FINDINGS: Dextroconvex lumbar curvature. Evaluation of the sacrum is limited due to overlying bowel content. No acute fracture. No malalignment. No compression deformity. Mild to moderate degenerative change of the lumbar spine.     Dextroconvex lumbar curvature. No acute osseous abnormality. No malalignment.    Dx-thoracic Spine-with Swimmers View    Result Date: 9/16/2019 9/16/2019 4:15 PM HISTORY/REASON FOR EXAM:  Atraumatic Pain; ?scoliotic MId back pain and low back pain after GLF x 2 weeks TECHNIQUE/EXAM DESCRIPTION AND NUMBER OF VIEWS:  Thoracic spine, 3 views. COMPARISON:  None. FINDINGS:  Diffuse osseous demineralization, limiting evaluation for nondisplaced fracture. No acute fracture. No malalignment. Mild chronic height loss of the mid thoracic spine from T9 to T11. Mild degenerative change of the thoracic spine.     Mild chronic height loss of the mid thoracic spine from T9 to T11.    Mr-femur-w/o Left    Result Date: 10/6/2019  10/6/2019 11:08 AM HISTORY/REASON FOR EXAM:  Left upper leg edema and redness TECHNIQUE/EXAM DESCRIPTION: MRI of the LEFT femur and pelvis without. The study was performed on a [employer] Signa 1.5 Mae MRI scanner. T1 sagittal, T1 coronal, T1 axial, T2 fat-suppressed axial, T1 postcontrast fat-suppressed axial, and fast spin-echo inversion recovery sagittal images were obtained of the femur. Additional axial and coronal T1 and T2 fat-sat or STIR images obtained of the pelvis. COMPARISON: CT pelvis 9/28/2018 FINDINGS: Exam is limited secondary to significant patient motion. There is edema in the sacrum bilaterally, likely related to subacute stress fracture. There is subtle sclerosis visualized on CT. Additionally, there is a comminuted fracture of the left pubic bone which appears subacute to chronic. There is associated increased T2 signal and confluent low T1 signal. Low T1 signal extends towards the acetabulum. The fluid within the abductor minimus muscle appears to communicate with high T2 signal extending into the left inferior pubic ramus (image 28, series 7 of the  pelvic MRI) and superior pubic ramus (image 21, series 4 of the pelvic MRI). There is osseous expansion and probable cortical destruction, best seen on the T1 coronal sequence of the pelvis. No pubic symphysis or SI joint widening is identified. No abnormal marrow signal is appreciated in the visualized femur. There is edema and enlargement of the muscles of the medial compartment and pectineus muscle. Low signal within the abductor muscles is consistent with calcification seen on recent CT. There is fluid  within the fibers of the adductor longus muscle. There  is significant edema in the subcutaneous tissues. There is an additional fluid collection within the pubococcygeus muscle on the left. No significant hip effusion is identified. Limited visualization of the visceral pelvis is grossly unremarkable.     1.  Limited exam secondary to significant patient motion. 2.  Subacute fracture involving the left pubic bone with osteomyelitis and probable intraosseous abscess. 3.  Extensive edema and enlargement of the muscles of the medial compartment, pectineus muscle, and pubococcygeus muscle is consistent with known intramuscular abscesses. Fluid collection is most extensive in the adductor longus muscle. 4.  Subacute fractures of the sacrum Comment: Results given to Dr. Varma through [employer] text at 3:56 PM    Mr-lumbar Spine-w/o    Result Date: 9/28/2019 9/28/2019 6:21 PM HISTORY/REASON FOR EXAM:  back pain. TECHNIQUE/EXAM DESCRIPTION: MRI of the lumbar spine without contrast. The study was performed on a [employer] Signa 1.5 Mae MRI scanner. T1 sagittal, T2 fast spin-echo sagittal, and T2 axial images were obtained of the lumbar spine. COMPARISON:  None. FINDINGS: There are bilateral sacral alar fractures. The lumbar spine maintains normal height and alignment. At the level of L5-S1, there is no spinal or neural foraminal stenosis. At the level of L4-5, there is no spinal or neural foraminal stenosis. At the level of L3-4, there is no spinal or neural foraminal stenosis. At the level of the L2-3, there is no spinal or neural foraminal stenosis. At the level of L1-2, there is no spinal or neural foraminal stenosis. The conus terminates at the level of L1. The visualized lower thoracic spinal cord appear normal. There is edema in the posterior paraspinal soft tissue likely representing soft tissue contusion.     1.  Acute bilateral sacral alae fractures. 2.  There is edema in the lower posterior paraspinal soft tissue likely  representing soft tissue contusion. 3.  There is no lumbar spine fractures.     Mr-thoracic Spine-w/o    Result Date: 9/28/2019 9/28/2019 6:21 PM HISTORY/REASON FOR EXAM:  back pain TECHNIQUE/EXAM DESCRIPTION: MRI of the thoracic spine without contrast. The study was performed on a [employer] 1.5 Mae MRI scanner. T1 sagittal, T2 fast spin-echo sagittal, and T2 axial images were obtained of the thoracic spine. COMPARISON: None. FINDINGS: There is mild chronic compression deformity at T11. There is no fracture or dislocation. There is no pathologic marrow infiltration. There is no intradural extramedullary lesion. There is no abnormal intramedullary T2 signal intensity in the thoracic spinal cord.     1.  No acute fracture in the thoracic spine. 2.  Mild chronic compression deformity at T11.    Us-extremity Venous Lower Unilat Left    Result Date: 9/28/2019  HISTORY/REASON FOR EXAM:  Leg swelling after fall TECHNIQUE/EXAM DESCRIPTION: Left lower extremity doppler venous ultrasound was performed. Using both color and pulse-wave Doppler imaging, multiple images were obtained from the common femoral vein origins distally through the popliteal trifurcations.  9/28/2019 12:54 AM COMPARISON:  None. FINDINGS: REAL-TIME GRAY-SCALE IMAGING: Real-time gray-scale imaging reveals no evidence of focal wall thickening. COLOR AND DUPLEX DOPPLER IMAGING: Graded compression was used to demonstrate patent lumens.  There is no evidence for luminal thrombus.  There is normal response to augmentation and respiratory variation. There are two rounded complex areas in the left groin region, the largest measuring about 10.9 cm diameter and the smaller, about 8.3 cm in diameter, both of which are consistent with hematomata.     1.  No evidence of Left  lower extremity deep venous thrombosis. 2.  Two complex areas within the left groin, consistent with hematomata. INTERPRETING LOCATION: [address] [facility], [id number]    Us-ruq    Result Date:  10/1/2019  10/1/2019 5:49 PM HISTORY/REASON FOR EXAM:  Edema Abdominal pain TECHNIQUE/EXAM DESCRIPTION AND NUMBER OF VIEWS:  Real-time sonography of the liver and biliary tree. COMPARISON: None FINDINGS: The liver is normal in contour. There is no evidence of solid mass lesion. The liver measures 20.15 cm. The gallbladder is surgically absent. The common duct measures 0.44 cm. The pancreas is obscured by bowel gas. The visualized aorta is normal in caliber. Intrahepatic IVC is patent. The portal vein is patent with hepatopetal flow. The MPV measures 1.57 cm. The right kidney measures 11.06 cm. There is no hydronephrosis. There is no ascites.     Hepatomegaly. Status post cholecystectomy. Dilated portal vein. Status post cholecystectomy. No biliary ductal dilatation. Limited evaluation of the pancreas which is obscured.    Mr-pelvis W/o    Result Date: 10/6/2019  10/6/2019 11:08 AM HISTORY/REASON FOR EXAM:  Left upper leg edema and redness TECHNIQUE/EXAM DESCRIPTION: MRI of the LEFT femur and pelvis without. The study was performed on a [employer] 1.5 Mae MRI scanner. T1 sagittal, T1 coronal, T1 axial, T2 fat-suppressed axial, T1 postcontrast fat-suppressed axial, and fast spin-echo inversion recovery sagittal images were obtained of the femur. Additional axial and coronal T1 and T2 fat-sat or STIR images obtained of the pelvis. COMPARISON: CT pelvis 9/28/2018 FINDINGS: Exam is limited secondary to significant patient motion. There is edema in the sacrum bilaterally, likely related to subacute stress fracture. There is subtle sclerosis visualized on CT. Additionally, there is a comminuted fracture of the left pubic bone which appears subacute to chronic. There is associated increased T2 signal and confluent low T1 signal. Low T1 signal extends towards the acetabulum. The fluid within the abductor minimus muscle appears to communicate with high T2 signal extending into the left inferior pubic ramus (image 28, series  7 of the  pelvic MRI) and superior pubic ramus (image 21, series 4 of the pelvic MRI). There is osseous expansion and probable cortical destruction, best seen on the T1 coronal sequence of the pelvis. No pubic symphysis or SI joint widening is identified. No abnormal marrow signal is appreciated in the visualized femur. There is edema and enlargement of the muscles of the medial compartment and pectineus muscle. Low signal within the abductor muscles is consistent with calcification seen on recent CT. There is fluid within the fibers of the adductor longus muscle. There  is significant edema in the subcutaneous tissues. There is an additional fluid collection within the pubococcygeus muscle on the left. No significant hip effusion is identified. Limited visualization of the visceral pelvis is grossly unremarkable.     1.  Limited exam secondary to significant patient motion. 2.  Subacute fracture involving the left pubic bone with osteomyelitis and probable intraosseous abscess. 3.  Extensive edema and enlargement of the muscles of the medial compartment, pectineus muscle, and pubococcygeus muscle is consistent with known intramuscular abscesses. Fluid collection is most extensive in the adductor longus muscle. 4.  Subacute fractures of the sacrum Comment: Results given to Dr. Varma through [place] text at 3:56 PM    Us-extremity Non Vascular Unilateral Right    Result Date: 10/7/2019  10/7/2019 2:30 PM HISTORY/REASON FOR EXAM:  Evaluate right knee for effusion. Intramuscular abscesses TECHNIQUE/EXAM DESCRIPTION AND NUMBER OF VIEWS: Sonographic images of the left knee. COMPARISON: MRI femur 10/6/2019 FINDINGS: There is a complex hypoechoic collection in the medial posterior left knee measuring approximately 3.6 x 3.1 x 2 cm.     Loculated collection in the posteromedial left knee likely represents a Baker's cyst. No significant knee effusion is identified in the upper knee joint on recent MRI.    Ir-picc Line  Placement W/ Guidance > Age 5    Result Date: 10/8/2019  HISTORY/REASON FOR EXAM:   PICC placement. TECHNIQUE/EXAM DESCRIPTION AND NUMBER OF VIEWS:   PICC line insertion with ultrasound guidance.  The procedure was performed using maximal sterile barrier technique including sterile gown, mask, cap, and donning of sterile gloves following appropriate hand hygiene and/or sterile scrub. Patient skin site was prepped with 2% Chlorhexidine solution. FINDINGS:  PICC line insertion with Ultrasound Guidance was performed by qualified nursing staff without the assistance of a Radiologist. PICC positioning appropriateness confirmed by 3CG technology; chest xray only needed in the instance 3[employer] unable to confirm placement.              Ultrasound-guided PICC placement performed by qualified nursing staff as above.       Micro:  No results found for: BLOODCULTU, BLDCULT, BCHOLD       Assessment:  Active Hospital Problems    Diagnosis   • *Pubic ramus fracture ([facility]) [S32.599A]   • Rheumatoid arthritis ([facility]) [M06.9]   • Chronic pain of right knee [M25.561, G89.29]   • Osteomyelitis ([facility]) [M86.9]   • Candida rash of groin [B37.89]   • Abscess of left thigh [L02.416]   • Sepsis ([facility]) [A41.9]   • Bilateral leg edema [R60.0]   • Hematoma of left thigh [S70.12XA]     Plan:  Osteomyelitis of the left pubic bone (noted on MRI)  Status post recent fall found to have subacute fracture on presentation  ESR - 55  Anticipate 6 weeks of IV antibiotics with a stop date of 11/19/2019  Continue antibiotics per below  Status post I&D on 10/9/2019 by Dr. Olivares. Thickened periosteum and soft bone was encountered intra-op  Some leakage around the IR drain site per patient and dressing is damp. Recommend IR re-evaluation of the drain  OR pathology consistent with osteomyelitis, left pubis tissue culture from 10/9 with MSSA     Abscess of left thigh, improved  No fevers  Leukocytosis resolved  Sustained recent fall 1 month prior to admission  Blood  cultures-negative to date  Images reviewed. Initial CT with 2 complex abscesses measuring 8.6cm and 7.2cm respectively in the longest dimension  Status post CT-guided drainage on 10/2  Repeat CT on 10/7-interval decrease in the left pelvis and thigh intramuscular abscess - difficult to measure given complex nature  Cultures+ MSSA.  Gram stain after discussion with micro lab was corrected to GPCs alone  Changed IV Unasyn to IV Ancef 2 g every 8 hours on 10/12  Stop date 11/19/2019 as above  Weekly CBC with differential, CMP while on IV antibiotics  Further surgical management performed on 10/9 as above     Pityriasis versicolor, intertrigo.  On treatment, improved  Light brown macular rash on chest and trunk with some scaling with pressure, some coalescing into patches, non-pruritic  Has been ongoing for about 2 weeks, similar presentation a month ago  Received 10 days of fluconazole  Patient is on miconazole cream for intertrigo. Recommend application to chest as well -  started 10/11. Will require 2-4 weeks of treatment-improving with topical therapy     Rheumatoid arthritis  On Humira prior to admission  Chronically immunosuppressed     Chronic right knee pain  Plan for future total right knee arthroplasty     Recent fall  Approximately 1 month prior to admission  Imaging on presentation revealed subacute fracture of the left pubic bone     Disposition: TBD, likely [facility] soon     Plan of care discussed with bedside RN

## 2019-10-15 NOTE — PROGRESS NOTES
Report from Nas RODRIGUEZ RN. Pt awake and alert, no needs.     Pt requesting pain medication.    Pt up for shower with CNA assist.     Pending placement

## 2019-10-16 ENCOUNTER — APPOINTMENT (OUTPATIENT)
Dept: PHYSICAL MEDICINE AND REHAB | Facility: MEDICAL CENTER | Age: 44
End: 2019-10-16
Payer: COMMERCIAL

## 2019-10-16 PROBLEM — R11.2 NAUSEA AND VOMITING: Status: ACTIVE | Noted: 2019-10-16

## 2019-10-16 PROCEDURE — 700111 HCHG RX REV CODE 636 W/ 250 OVERRIDE (IP): Performed by: HOSPITALIST

## 2019-10-16 PROCEDURE — 700102 HCHG RX REV CODE 250 W/ 637 OVERRIDE(OP): Performed by: HOSPITALIST

## 2019-10-16 PROCEDURE — A9270 NON-COVERED ITEM OR SERVICE: HCPCS | Performed by: HOSPITALIST

## 2019-10-16 PROCEDURE — 770001 HCHG ROOM/CARE - MED/SURG/GYN PRIV*

## 2019-10-16 PROCEDURE — 700105 HCHG RX REV CODE 258: Performed by: INTERNAL MEDICINE

## 2019-10-16 PROCEDURE — 700111 HCHG RX REV CODE 636 W/ 250 OVERRIDE (IP): Performed by: INTERNAL MEDICINE

## 2019-10-16 PROCEDURE — 700102 HCHG RX REV CODE 250 W/ 637 OVERRIDE(OP): Performed by: INTERNAL MEDICINE

## 2019-10-16 PROCEDURE — A9270 NON-COVERED ITEM OR SERVICE: HCPCS | Performed by: INTERNAL MEDICINE

## 2019-10-16 PROCEDURE — 99232 SBSQ HOSP IP/OBS MODERATE 35: CPT | Performed by: INTERNAL MEDICINE

## 2019-10-16 RX ORDER — PREDNISONE 20 MG/1
40 TABLET ORAL DAILY
Status: DISCONTINUED | OUTPATIENT
Start: 2019-10-16 | End: 2019-10-17 | Stop reason: HOSPADM

## 2019-10-16 RX ORDER — PREDNISONE 20 MG/1
40 TABLET ORAL ONCE
Status: COMPLETED | OUTPATIENT
Start: 2019-10-16 | End: 2019-10-16

## 2019-10-16 RX ORDER — ONDANSETRON 4 MG/1
4 TABLET, ORALLY DISINTEGRATING ORAL
Status: DISCONTINUED | OUTPATIENT
Start: 2019-10-16 | End: 2019-10-17 | Stop reason: HOSPADM

## 2019-10-16 RX ORDER — MORPHINE SULFATE 15 MG/1
15 TABLET, FILM COATED, EXTENDED RELEASE ORAL 3 TIMES DAILY
Status: DISCONTINUED | OUTPATIENT
Start: 2019-10-16 | End: 2019-10-17 | Stop reason: HOSPADM

## 2019-10-16 RX ORDER — ACETAMINOPHEN 500 MG
1000 TABLET ORAL 3 TIMES DAILY
Status: DISCONTINUED | OUTPATIENT
Start: 2019-10-16 | End: 2019-10-17 | Stop reason: HOSPADM

## 2019-10-16 RX ADMIN — CEFAZOLIN 2 G: 10 INJECTION, POWDER, FOR SOLUTION INTRAVENOUS at 05:28

## 2019-10-16 RX ADMIN — MORPHINE SULFATE 30 MG: 30 TABLET, EXTENDED RELEASE ORAL at 05:31

## 2019-10-16 RX ADMIN — ONDANSETRON 4 MG: 4 TABLET, ORALLY DISINTEGRATING ORAL at 16:59

## 2019-10-16 RX ADMIN — MORPHINE SULFATE 15 MG: 15 TABLET, EXTENDED RELEASE ORAL at 11:50

## 2019-10-16 RX ADMIN — PREGABALIN 25 MG: 25 CAPSULE ORAL at 05:30

## 2019-10-16 RX ADMIN — NICOTINE 14 MG: 14 PATCH, EXTENDED RELEASE TRANSDERMAL at 05:29

## 2019-10-16 RX ADMIN — ENOXAPARIN SODIUM 40 MG: 100 INJECTION SUBCUTANEOUS at 05:32

## 2019-10-16 RX ADMIN — OMEPRAZOLE 40 MG: 20 CAPSULE, DELAYED RELEASE ORAL at 17:03

## 2019-10-16 RX ADMIN — PREGABALIN 25 MG: 25 CAPSULE ORAL at 11:50

## 2019-10-16 RX ADMIN — ACETAMINOPHEN 1000 MG: 500 TABLET, FILM COATED ORAL at 11:50

## 2019-10-16 RX ADMIN — ONDANSETRON 4 MG: 2 INJECTION INTRAMUSCULAR; INTRAVENOUS at 22:10

## 2019-10-16 RX ADMIN — OXYCODONE HYDROCHLORIDE 10 MG: 10 TABLET ORAL at 19:47

## 2019-10-16 RX ADMIN — ONDANSETRON 4 MG: 2 INJECTION INTRAMUSCULAR; INTRAVENOUS at 10:16

## 2019-10-16 RX ADMIN — MICONAZOLE NITRATE: 20 CREAM TOPICAL at 17:00

## 2019-10-16 RX ADMIN — OXYCODONE HYDROCHLORIDE 10 MG: 10 TABLET ORAL at 15:03

## 2019-10-16 RX ADMIN — OXYCODONE HYDROCHLORIDE 10 MG: 10 TABLET ORAL at 10:38

## 2019-10-16 RX ADMIN — SENNOSIDES, DOCUSATE SODIUM 2 TABLET: 50; 8.6 TABLET, FILM COATED ORAL at 17:03

## 2019-10-16 RX ADMIN — OMEPRAZOLE 40 MG: 20 CAPSULE, DELAYED RELEASE ORAL at 05:31

## 2019-10-16 RX ADMIN — LOSARTAN POTASSIUM 25 MG: 25 TABLET, FILM COATED ORAL at 05:41

## 2019-10-16 RX ADMIN — CEFAZOLIN 2 G: 10 INJECTION, POWDER, FOR SOLUTION INTRAVENOUS at 13:24

## 2019-10-16 RX ADMIN — ACETAMINOPHEN 1000 MG: 500 TABLET, FILM COATED ORAL at 17:03

## 2019-10-16 RX ADMIN — CEFAZOLIN 2 G: 10 INJECTION, POWDER, FOR SOLUTION INTRAVENOUS at 22:16

## 2019-10-16 RX ADMIN — PREDNISONE 40 MG: 20 TABLET ORAL at 10:38

## 2019-10-16 RX ADMIN — VITAMIN D, TAB 1000IU (100/BT) 1000 UNITS: 25 TAB at 05:42

## 2019-10-16 RX ADMIN — PREGABALIN 25 MG: 25 CAPSULE ORAL at 17:03

## 2019-10-16 RX ADMIN — FERROUS SULFATE TAB 325 MG (65 MG ELEMENTAL FE) 325 MG: 325 (65 FE) TAB at 05:31

## 2019-10-16 RX ADMIN — MORPHINE SULFATE 15 MG: 15 TABLET, EXTENDED RELEASE ORAL at 17:03

## 2019-10-16 RX ADMIN — CALCIUM CITRATE 200 MG (950 MG) TABLET 950 MG: at 05:31

## 2019-10-16 RX ADMIN — SENNOSIDES, DOCUSATE SODIUM 2 TABLET: 50; 8.6 TABLET, FILM COATED ORAL at 05:32

## 2019-10-16 RX ADMIN — MICONAZOLE NITRATE: 20 CREAM TOPICAL at 05:42

## 2019-10-16 RX ADMIN — OXYCODONE HYDROCHLORIDE 10 MG: 10 TABLET ORAL at 06:28

## 2019-10-16 ASSESSMENT — ENCOUNTER SYMPTOMS
ABDOMINAL PAIN: 0
NAUSEA: 0
TREMORS: 0
HEADACHES: 0
SHORTNESS OF BREATH: 0
CONSTIPATION: 0
VOMITING: 1
ABDOMINAL PAIN: 1
BACK PAIN: 0
COUGH: 0
STRIDOR: 0
SORE THROAT: 0
MYALGIAS: 1
VOMITING: 0
CONSTIPATION: 1
NAUSEA: 1
DIARRHEA: 0
FEVER: 0
DIZZINESS: 0
CHILLS: 0

## 2019-10-16 NOTE — CARE PLAN
Problem: Communication  Goal: The ability to communicate needs accurately and effectively will improve  Outcome: PROGRESSING AS EXPECTED  Note:   Plan of care discussed, pt verbalizes understanding, Dr. Varma notified pt would like to speak with her before taking new prescribed prednisone     Problem: Venous Thromboembolism (VTW)/Deep Vein Thrombosis (DVT) Prevention:  Goal: Patient will participate in Venous Thrombosis (VTE)/Deep Vein Thrombosis (DVT)Prevention Measures  Outcome: PROGRESSING AS EXPECTED  Flowsheets  Taken 10/16/2019 0838  Mechanical Prophylaxis : SCDs, Sequential Compression Device  Taken 10/16/2019 0956  SCDs, Sequential Compression Device: On     Problem: Safety  Goal: Will remain free from injury  Outcome: PROGRESSING AS EXPECTED  Note:   Call light within reach, hourly rounding, treaded socks on, bed locked in low position

## 2019-10-16 NOTE — CARE PLAN
Problem: Pain Management  Goal: Pain level will decrease to patient's comfort goal  Outcome: PROGRESSING SLOWER THAN EXPECTED   Patient still requiring both long acting and short acting pain medications.   Patient encouraged to use other methods of pain relief .    Problem: Mobility  Goal: Risk for activity intolerance will decrease  Outcome: PROGRESSING SLOWER THAN EXPECTED   Patient is not up ambulating due to pain.  He is encouraged to call for pain medications before ambulating to allow for increased mobility.

## 2019-10-16 NOTE — DISCHARGE PLANNING
Rehab follow up:   [facility] is able to accept pt tomorrow  Tc to spouse/ Katiana Hubbard @ [phone] and she is in agreement w/ transfers.   TC to  Ann Ramirez advising her of acceptance to rehab.    Plan:   Arrange transport from Thurs 10/17  Contact Jo SPEAR @  Ext [phone] advising of  time.

## 2019-10-16 NOTE — PROGRESS NOTES
Infectious Disease Progress Note    Author: Jessica Goldman M.D. Date & Time of service: 10/16/2019  8:40 AM    Chief Complaint:  Follow-up for left thigh abscess/pelvic OM     Interval History:  44 y.o. man with a history of rheumatoid arthritis on Humira, chronic right knee pain with plans for total right knee arthroplasty, and recent fall sustained 1 month prior to admission admitted for worsening left thigh/groin pain.  Patient found to have multiple fluid collections on imaging which were concerning for an abscess.  He underwent CT-guided drainage on 10/2 with cultures growing MSSA.     10/6 afebrile WBC 12.7 ongoing left groin pain however the crampy pain is somewhat decreased and he is able to ambulate better.  Plan for MRI of the pelvis and femur today  10/7 afebrile WBC 11.4 patient is very anxious after hearing about results of MRI.  MRI of the pelvis reveals osteomyelitis.  Awaiting surgical evaluation for possible surgical debridement.  Patient states left thigh pain remains unchanged and requiring multiple pain medications.  Pain worse with standing  10/8 afebrile WBC 8 patient is agreeable to surgery.  Repeat CT scan shows interval decrease and left thigh abscess.  Minimal output in drain.  Waiting for PICC line  10/9 AF WBC 6.6 plan for surgery today, also has flare up of rash on chest associated with itching  10/10 afebrile WBC 6.4 patient is complaining of significant postop pain.  He underwent debridement yesterday.  He is also complaining of constipation and has not had any bowel movement since Monday.  Wife at the bedside states that surgeon mentioned bone looked infected  10/11 T max 99.1. White count 6.7. Tolerating antibiotics. Pain at surgical site   10/12 afebrile, white count 6.2.  Tolerating antibiotics.  Pathology consistent with osteomyelitis.  OR cultures no growth till date.  Trying to wean off IV pain meds so he can go to rehab.  10/13 afebrile, white count 5.1.  Notes that there  is leakage around his IR drain site, feels that it may have been pulled out some, and the dressing is wet.  Continues to have pain.   10/14   10/15 afebrile.  Patient feels poorly but overall improved since admission.  He also notes a tender area along his incision  10/16 AF no CBC done today. Pt concerned about IV abx being a barrier to transfer to rehab and is reluctant to transition to PO abx    Review of Systems:  Review of Systems   Constitutional: Negative for chills, fever and malaise/fatigue.   Gastrointestinal: Positive for abdominal pain and constipation. Negative for diarrhea, nausea and vomiting.   Musculoskeletal: Positive for joint pain and myalgias.   Neurological: Negative for dizziness and headaches.   All other systems reviewed and are negative.      Hemodynamics:  Temp (24hrs), Av.7 °C (98.1 °F), Min:36.5 °C (97.7 °F), Max:36.9 °C (98.4 °F)  Temperature: 36.5 °C (97.7 °F)  Pulse  Av.1  Min: 50  Max: 109   Blood Pressure: 117/72       Physical Exam:  Physical Exam   Constitutional: He is oriented to person, place, and time. He appears well-developed and well-nourished.   HENT:   Head: Normocephalic and atraumatic.   Eyes: Pupils are equal, round, and reactive to light. Conjunctivae and EOM are normal.   Neck: Neck supple.   Cardiovascular: Normal rate, regular rhythm and normal heart sounds.   Pulmonary/Chest: Effort normal and breath sounds normal.   Abdominal: Soft. Bowel sounds are normal. He exhibits no distension. There is no tenderness. There is no rebound and no guarding.   Prior drain site without any drainage.    Lower pelvic surgical site with area of induration. +TTP   Musculoskeletal: He exhibits edema.   Right knee.      Left upper extremity PICC line-nontender, no surrounding erythema   Neurological: He is alert and oriented to person, place, and time.   Skin: Rash noted.   Mild erythematous rash in groin, patchy, scaling macular rash on abdomen chest, improving    Psychiatric: He has a normal mood and affect. His behavior is normal.       Meds:    Current Facility-Administered Medications:   •  ondansetron  •  morphine ER  •  ceFAZolin  •  simethicone  •  oxyCODONE immediate-release  •  enoxaparin  •  miconazole  •  vitamin D  •  calcium citrate  •  ferrous sulfate  •  furosemide  •  losartan  •  omeprazole  •  pregabalin  •  senna-docusate **AND** polyethylene glycol/lytes **AND** magnesium hydroxide **AND** bisacodyl  •  acetaminophen  •  nicotine **AND** Nicotine Replacement Patient Education Materials **AND** nicotine polacrilex  •  morphine injection    Labs:  Recent Labs     10/14/19  0411 10/15/19  0958   WBC 5.4 5.3   RBC 3.31* 3.31*   HEMOGLOBIN 8.9* 8.8*   HEMATOCRIT 29.4* 29.0*   MCV 88.8 87.6   MCH 26.9* 26.6*   RDW 64.4* 62.2*   PLATELETCT 377 339   MPV 8.7* 8.6*   NEUTSPOLYS 47.30 57.50   LYMPHOCYTES 35.80 29.10   MONOCYTES 11.40 9.30   EOSINOPHILS 2.60 1.90   BASOPHILS 1.10 1.30     Recent Labs     10/14/19  0411 10/15/19  0927   SODIUM 139 139   POTASSIUM 4.3 4.4   CHLORIDE 103 102   CO2 25 26   GLUCOSE 102* 89   BUN 10 9     Recent Labs     10/14/19  0411 10/15/19  0927   CREATININE 0.78 0.55       Imaging:  Ct-abdomen-pelvis With    Result Date: 10/7/2019  10/7/2019 4:57 PM HISTORY/REASON FOR EXAM:  Left groin pain. History of CT drain placement on 10/2/2018. TECHNIQUE/EXAM DESCRIPTION:   CT scan of the abdomen and pelvis with contrast. Contrast-enhanced helical scanning was obtained from the diaphragmatic domes through the pubic symphysis following the bolus administration of nonionic contrast without complication. 100 mL of Omnipaque 350 nonionic contrast was administered without complication. Low dose optimization technique was utilized for this CT exam including automated exposure control and adjustment of the mA and/or kV according to patient size. COMPARISON: 9/28/2019 FINDINGS: The visualized lung bases are unremarkable. CT Abdomen: The liver,  spleen, adrenal glands, pancreas, and kidneys are unremarkable. The gallbladder has been removed. There are postoperative changes in the bowel scattered air-fluid levels. A small ventral hernia superior to the umbilicus contains the anterior wall of the colon without secondary obstruction. No transition point to suggest obstruction appreciated. There  is a moderate amount of stool in the rectosigmoid colon. CT Pelvis: The bladder is unremarkable. A drain has been placed in the fluid collection within the left abductor muscles. The collection has decreased in size from the prior exam and is difficult to measure. Fluid collection in the anterior aspect of the adductor longus muscle measures approximately 5.3 x 2.8 x 2 cm, previously approximately 6.5 x 3.5 x 1.7 cm. Fluid collection within the left pubococcygeus muscle measures approximately 2.5 x 1.6 x 2.1 cm, previously 2.4 x 2.3 x 1.1 cm. There is edema in the anterior subcutaneous tissues. Fracture of the left pubic bone is again noted.     1.  Interval decrease in the intramuscular abscesses involving the left pelvis and thigh following drain placement. Collection in the pubococcygeus muscle is similar in size to the prior exam. 2.  Ventral hernia containing anterior bowel wall and fat. No secondary obstruction.    Ct-drain-hematoma - Seroma    Result Date: 10/2/2019  10/2/2019 12:14 PM HISTORY/REASON FOR EXAM:  Left inguinal fluid collection. Moderate sedation was administered with continuous monitoring of the patient under the direct supervision of  Total sedation time: 30 minutes The biopsy/drainage was done utilizing low dose optimization CT techniques including auto modulation for  imaging and low mA CT/fluoroscopy mode for the procedure. PROCEDURE: After the informed consent the patient was placed on the CT table on supine position. The skin over the left groin was prepped. Localization CT scan demonstrates fluid collection in the left  obturator externus and pectineus muscles. Subsequently after injecting lidocaine needle was advanced into the larger fluid collection. Pus was aspirated. Subsequently a wire was introduced. After dilating the tract an 8 Maori drain was introduced and placed with its loop in the fluid collection. An approximately 100 mL of fluid was aspirated. Subsequently under the needle was advanced into the superficial/pectineus muscle fluid collection. An approximately 10 mL of fluid was aspirated. No drain was placed in the superficial fluid collection. A sample of the material was sent to the microbe allergy. The patient tolerated the procedure without any immediate complication.     1.  Left inguinal region muscular abscess. 2.  CT-guided drainage and catheter placement of large collection in the left obturator externus. 3.  CT-guided aspiration of smaller collection in the left pectineus muscle.    Ct-lspine W/o Plus Recons    Result Date: 9/28/2019 9/28/2019 4:58 AM HISTORY/REASON FOR EXAM: Back pain after recent fall TECHNIQUE/EXAM DESCRIPTION: CT lumbar spine without contrast, with reconstructions. Noncontrast helical scanning was obtained from T12 through the sacrum.  Sagittal reconstructions were generated from the axial images. Low dose optimization technique was utilized for this CT exam including automated exposure control and adjustment of the mA and/or kV according to patient size. COMPARISON:  None. FINDINGS: The bones are markedly osteopenic. There is a minimal compression deformity of the superior endplate of the L2 body, which is old. There is no evidence of acute fracture. There is multilevel interspace and facet arthropathy.     No acute fracture or traumatic malalignment.    Ct-pelvis With    Result Date: 9/28/2019 9/28/2019 3:21 AM HISTORY/REASON FOR EXAM:  significant left groin pain, possible hematoma on us. TECHNIQUE/EXAM DESCRIPTION AND NUMBER OF VIEWS: CT scan of the pelvis with contrast.  Contrast-enhanced helical scanning of the pelvis was obtained from the iliac crests through the pubic symphysis following the bolus administration of 100 mL of Omnipaque 350 nonionic contrast without complication. Low dose optimization technique was utilized for this CT exam including automated exposure control and adjustment of the mA and/or kV according to patient size. COMPARISON:  None. FINDINGS: In the deep left inguinal region, there is a masslike structure containing irregular fluid-filled cavity, which measures overall about 8.6 cm in longest dimension. Within the cavity or focal areas of increased attenuation consistent with calcification. This lesion is immediately adjacent to a recent no probably subacute fracture of the left superior pubic ramus near the symphysis. Lateral to this lesion is a smaller complex mass measuring about 7.3 cm maximum dimension. There is no abnormality within the visualized pelvic cavity and there is no free intraperitoneal fluid. The bladder is intact.     1.  Recent but probably subacute fracture of the left superior pubic ramus near the symphysis. Adjacent and just lateral to this are two complex masslike structures, the largest and most medial of which contains a fluid-filled cavity. These are consistent with hematomata, possibly intramuscular.    Dx-lumbar Spine-2 Or 3 Views    Result Date: 9/16/2019 9/16/2019 4:15 PM HISTORY/REASON FOR EXAM:  Atraumatic Pain TECHNIQUE/ EXAM DESCRIPTION AND NUMBER OF VIEWS:  3 views of the lumbar spine. COMPARISON: None. FINDINGS: Dextroconvex lumbar curvature. Evaluation of the sacrum is limited due to overlying bowel content. No acute fracture. No malalignment. No compression deformity. Mild to moderate degenerative change of the lumbar spine.     Dextroconvex lumbar curvature. No acute osseous abnormality. No malalignment.    Dx-thoracic Spine-with Swimmers View    Result Date: 9/16/2019 9/16/2019 4:15 PM HISTORY/REASON FOR EXAM:   Atraumatic Pain; ?scoliotic MId back pain and low back pain after GLF x 2 weeks TECHNIQUE/EXAM DESCRIPTION AND NUMBER OF VIEWS:  Thoracic spine, 3 views. COMPARISON:  None. FINDINGS: Diffuse osseous demineralization, limiting evaluation for nondisplaced fracture. No acute fracture. No malalignment. Mild chronic height loss of the mid thoracic spine from T9 to T11. Mild degenerative change of the thoracic spine.     Mild chronic height loss of the mid thoracic spine from T9 to T11.    Mr-femur-w/o Left    Result Date: 10/6/2019  10/6/2019 11:08 AM HISTORY/REASON FOR EXAM:  Left upper leg edema and redness TECHNIQUE/EXAM DESCRIPTION: MRI of the LEFT femur and pelvis without. The study was performed on a [employer] 1.5 Mae MRI scanner. T1 sagittal, T1 coronal, T1 axial, T2 fat-suppressed axial, T1 postcontrast fat-suppressed axial, and fast spin-echo inversion recovery sagittal images were obtained of the femur. Additional axial and coronal T1 and T2 fat-sat or STIR images obtained of the pelvis. COMPARISON: CT pelvis 9/28/2018 FINDINGS: Exam is limited secondary to significant patient motion. There is edema in the sacrum bilaterally, likely related to subacute stress fracture. There is subtle sclerosis visualized on CT. Additionally, there is a comminuted fracture of the left pubic bone which appears subacute to chronic. There is associated increased T2 signal and confluent low T1 signal. Low T1 signal extends towards the acetabulum. The fluid within the abductor minimus muscle appears to communicate with high T2 signal extending into the left inferior pubic ramus (image 28, series 7 of the  pelvic MRI) and superior pubic ramus (image 21, series 4 of the pelvic MRI). There is osseous expansion and probable cortical destruction, best seen on the T1 coronal sequence of the pelvis. No pubic symphysis or SI joint widening is identified. No abnormal marrow signal is appreciated in the visualized femur. There is edema and  enlargement of the muscles of the medial compartment and pectineus muscle. Low signal within the abductor muscles is consistent with calcification seen on recent CT. There is fluid within the fibers of the adductor longus muscle. There  is significant edema in the subcutaneous tissues. There is an additional fluid collection within the pubococcygeus muscle on the left. No significant hip effusion is identified. Limited visualization of the visceral pelvis is grossly unremarkable.     1.  Limited exam secondary to significant patient motion. 2.  Subacute fracture involving the left pubic bone with osteomyelitis and probable intraosseous abscess. 3.  Extensive edema and enlargement of the muscles of the medial compartment, pectineus muscle, and pubococcygeus muscle is consistent with known intramuscular abscesses. Fluid collection is most extensive in the adductor longus muscle. 4.  Subacute fractures of the sacrum Comment: Results given to Dr. Varma through [place] text at 3:56 PM    Mr-lumbar Spine-w/o    Result Date: 9/28/2019 9/28/2019 6:21 PM HISTORY/REASON FOR EXAM:  back pain. TECHNIQUE/EXAM DESCRIPTION: MRI of the lumbar spine without contrast. The study was performed on a [employer] Signa 1.5 Mae MRI scanner. T1 sagittal, T2 fast spin-echo sagittal, and T2 axial images were obtained of the lumbar spine. COMPARISON:  None. FINDINGS: There are bilateral sacral alar fractures. The lumbar spine maintains normal height and alignment. At the level of L5-S1, there is no spinal or neural foraminal stenosis. At the level of L4-5, there is no spinal or neural foraminal stenosis. At the level of L3-4, there is no spinal or neural foraminal stenosis. At the level of the L2-3, there is no spinal or neural foraminal stenosis. At the level of L1-2, there is no spinal or neural foraminal stenosis. The conus terminates at the level of L1. The visualized lower thoracic spinal cord appear normal. There is edema in the posterior  paraspinal soft tissue likely representing soft tissue contusion.     1.  Acute bilateral sacral alae fractures. 2.  There is edema in the lower posterior paraspinal soft tissue likely representing soft tissue contusion. 3.  There is no lumbar spine fractures.     Mr-thoracic Spine-w/o    Result Date: 9/28/2019 9/28/2019 6:21 PM HISTORY/REASON FOR EXAM:  back pain TECHNIQUE/EXAM DESCRIPTION: MRI of the thoracic spine without contrast. The study was performed on a [employer] Signa 1.5 Mae MRI scanner. T1 sagittal, T2 fast spin-echo sagittal, and T2 axial images were obtained of the thoracic spine. COMPARISON: None. FINDINGS: There is mild chronic compression deformity at T11. There is no fracture or dislocation. There is no pathologic marrow infiltration. There is no intradural extramedullary lesion. There is no abnormal intramedullary T2 signal intensity in the thoracic spinal cord.     1.  No acute fracture in the thoracic spine. 2.  Mild chronic compression deformity at T11.    Us-extremity Venous Lower Unilat Left    Result Date: 9/28/2019  HISTORY/REASON FOR EXAM:  Leg swelling after fall TECHNIQUE/EXAM DESCRIPTION: Left lower extremity doppler venous ultrasound was performed. Using both color and pulse-wave Doppler imaging, multiple images were obtained from the common femoral vein origins distally through the popliteal trifurcations.  9/28/2019 12:54 AM COMPARISON:  None. FINDINGS: REAL-TIME GRAY-SCALE IMAGING: Real-time gray-scale imaging reveals no evidence of focal wall thickening. COLOR AND DUPLEX DOPPLER IMAGING: Graded compression was used to demonstrate patent lumens.  There is no evidence for luminal thrombus.  There is normal response to augmentation and respiratory variation. There are two rounded complex areas in the left groin region, the largest measuring about 10.9 cm diameter and the smaller, about 8.3 cm in diameter, both of which are consistent with hematomata.     1.  No evidence of Left  lower  extremity deep venous thrombosis. 2.  Two complex areas within the left groin, consistent with hematomata. INTERPRETING LOCATION: [address] [facility] , [place], [address]    Us-ruq    Result Date: 10/1/2019  10/1/2019 5:49 PM HISTORY/REASON FOR EXAM:  Edema Abdominal pain TECHNIQUE/EXAM DESCRIPTION AND NUMBER OF VIEWS:  Real-time sonography of the liver and biliary tree. COMPARISON: None FINDINGS: The liver is normal in contour. There is no evidence of solid mass lesion. The liver measures 20.15 cm. The gallbladder is surgically absent. The common duct measures 0.44 cm. The pancreas is obscured by bowel gas. The visualized aorta is normal in caliber. Intrahepatic IVC is patent. The portal vein is patent with hepatopetal flow. The MPV measures 1.57 cm. The right kidney measures 11.06 cm. There is no hydronephrosis. There is no ascites.     Hepatomegaly. Status post cholecystectomy. Dilated portal vein. Status post cholecystectomy. No biliary ductal dilatation. Limited evaluation of the pancreas which is obscured.    Mr-pelvis W/o    Result Date: 10/6/2019  10/6/2019 11:08 AM HISTORY/REASON FOR EXAM:  Left upper leg edema and redness TECHNIQUE/EXAM DESCRIPTION: MRI of the LEFT femur and pelvis without. The study was performed on a [employer] Signa 1.5 Mae MRI scanner. T1 sagittal, T1 coronal, T1 axial, T2 fat-suppressed axial, T1 postcontrast fat-suppressed axial, and fast spin-echo inversion recovery sagittal images were obtained of the femur. Additional axial and coronal T1 and T2 fat-sat or STIR images obtained of the pelvis. COMPARISON: CT pelvis 9/28/2018 FINDINGS: Exam is limited secondary to significant patient motion. There is edema in the sacrum bilaterally, likely related to subacute stress fracture. There is subtle sclerosis visualized on CT. Additionally, there is a comminuted fracture of the left pubic bone which appears subacute to chronic. There is associated increased T2 signal and confluent low T1 signal. Low T1  signal extends towards the acetabulum. The fluid within the abductor minimus muscle appears to communicate with high T2 signal extending into the left inferior pubic ramus (image 28, series 7 of the  pelvic MRI) and superior pubic ramus (image 21, series 4 of the pelvic MRI). There is osseous expansion and probable cortical destruction, best seen on the T1 coronal sequence of the pelvis. No pubic symphysis or SI joint widening is identified. No abnormal marrow signal is appreciated in the visualized femur. There is edema and enlargement of the muscles of the medial compartment and pectineus muscle. Low signal within the abductor muscles is consistent with calcification seen on recent CT. There is fluid within the fibers of the adductor longus muscle. There  is significant edema in the subcutaneous tissues. There is an additional fluid collection within the pubococcygeus muscle on the left. No significant hip effusion is identified. Limited visualization of the visceral pelvis is grossly unremarkable.     1.  Limited exam secondary to significant patient motion. 2.  Subacute fracture involving the left pubic bone with osteomyelitis and probable intraosseous abscess. 3.  Extensive edema and enlargement of the muscles of the medial compartment, pectineus muscle, and pubococcygeus muscle is consistent with known intramuscular abscesses. Fluid collection is most extensive in the adductor longus muscle. 4.  Subacute fractures of the sacrum Comment: Results given to Dr. Varma through [place] text at 3:56 PM    Us-extremity Non Vascular Unilateral Right    Result Date: 10/7/2019  10/7/2019 2:30 PM HISTORY/REASON FOR EXAM:  Evaluate right knee for effusion. Intramuscular abscesses TECHNIQUE/EXAM DESCRIPTION AND NUMBER OF VIEWS: Sonographic images of the left knee. COMPARISON: MRI femur 10/6/2019 FINDINGS: There is a complex hypoechoic collection in the medial posterior left knee measuring approximately 3.6 x 3.1 x 2 cm.      Loculated collection in the posteromedial left knee likely represents a Baker's cyst. No significant knee effusion is identified in the upper knee joint on recent MRI.    Ir-picc Line Placement W/ Guidance > Age 5    Result Date: 10/8/2019  HISTORY/REASON FOR EXAM:   PICC placement. TECHNIQUE/EXAM DESCRIPTION AND NUMBER OF VIEWS:   PICC line insertion with ultrasound guidance.  The procedure was performed using maximal sterile barrier technique including sterile gown, mask, cap, and donning of sterile gloves following appropriate hand hygiene and/or sterile scrub. Patient skin site was prepped with 2% Chlorhexidine solution. FINDINGS:  PICC line insertion with Ultrasound Guidance was performed by qualified nursing staff without the assistance of a Radiologist. PICC positioning appropriateness confirmed by 3CG technology; chest xray only needed in the instance 3CG unable to confirm placement.              Ultrasound-guided PICC placement performed by qualified nursing staff as above.       Micro:  No results found for: BLOODCULTU, BLDCULT, BCHOLD       Assessment:  Active Hospital Problems    Diagnosis   • *Pubic ramus fracture ([facility]) [S32.599A]   • Rheumatoid arthritis (HCC) [M06.9]   • Chronic pain of right knee [M25.561, G89.29]   • Osteomyelitis (HCC) [M86.9]   • Candida rash of groin [B37.89]   • Abscess of left thigh [L02.416]   • Sepsis (HCC) [A41.9]   • Bilateral leg edema [R60.0]   • Hematoma of left thigh [S70.12XA]     Plan:  Osteomyelitis of the left pubic bone (noted on MRI), on treatment  Status post recent fall found to have subacute fracture on presentation  ESR - 55  Anticipate 6 weeks of IV antibiotics with a stop date of 11/19/2019  Continue antibiotics per below  Status post I&D on 10/9/2019 by Dr. Olivares. Thickened periosteum and soft bone was encountered intra-op  Some leakage around the IR drain site per patient and dressing is damp. Recommend IR re-evaluation of the drain  OR pathology  consistent with osteomyelitis, left pubis tissue culture from 10/9 with MSSA     Abscess of left thigh, improved  No fevers  Leukocytosis resolved  Sustained recent fall 1 month prior to admission  Blood cultures-negative to date  Images reviewed. Initial CT with 2 complex abscesses measuring 8.6cm and 7.2cm respectively in the longest dimension  Status post CT-guided drainage on 10/2  Repeat CT on 10/7-interval decrease in the left pelvis and thigh intramuscular abscess - difficult to measure given complex nature  Cultures+ MSSA.  Gram stain after discussion with micro lab was corrected to GPCs alone  Changed IV Unasyn to IV Ancef 2 g every 8 hours on 10/12  Stop date 11/19/2019 as above  Weekly CBC with differential, CMP while on IV antibiotics  Further surgical management performed on 10/9 as above     Pityriasis versicolor, intertrigo.  On treatment, improved  Light brown macular rash on chest and trunk with some scaling with pressure, some coalescing into patches, non-pruritic  Has been ongoing for about 2 weeks, similar presentation a month ago  Received 10 days of fluconazole  Patient is on miconazole cream for intertrigo. Recommend application to chest as well -  started 10/11. Will require 2-4 weeks of treatment-improving with topical therapy     Rheumatoid arthritis  On Humira prior to admission  Chronically immunosuppressed     Chronic right knee pain  Plan for future total right knee arthroplasty     Recent fall  Approximately 1 month prior to admission  Imaging on presentation revealed subacute fracture of the left pubic bone     Disposition: [facility]. Recommend pt stay on IV abx for treatment of osteomyelitis. If frequency of abx is a barrier, can switch to IV daptomycin 8mg/kg daily. Will need to monitor CPK weekly. Pt also reluctant to switch to PO abx.    I have performed a physical exam and reviewed and updated ROS and plan today 10/16/2019.  In review of yesterday's note 10/15/2019, there are  no changes except as documented above.      Plan of care discussed with  and BEATRIS Juarez

## 2019-10-16 NOTE — PROGRESS NOTES
Patient alert and oriented. C/o 5/10 pain to L groin and R knee, denies intervention at this time. Rash to chest, groin, feet, legs pale, dry and flaky. Dressing to lower abdomen with small firm spot beneath, per pt md aware. Abscess to L inner groin palpable, soft. Single lumen PICC in place to LUE. Pt denies up to chair at this time, states he will later. Voiding in urinal without difficulty. Safety precautions in place, hourly rounding.

## 2019-10-16 NOTE — DISCHARGE PLANNING
Transitional Care Coordination-Rehab follow up.   [facility] is able to administer IVAB q 8 hrs.    Confirmed follow up / dc plan after potential admission to rehab:   Tc to [facility] [phone]; spoke with Swati who confirms they are able to provide 2gm IV Ancef q 8 hours (IV push)  at home.      Tc to pt's spouse, Katiana Hubbard [phone].  She is in agreement transfer to Acute Rehab.   She is able/ willing to assist w/ IVAB @ home.   PT's mother is currently staying w/ pt's spouse and two young children ages  4 years old and 17 months old.   Single story home w/ 2 small steps in [place]  Walk in shower.     PT was seen by Dr. Gutierrez 1 week before hospitalization.     Plan: Follow up regarding acceptance to Rehab pending bed availability and insurance authorization.

## 2019-10-16 NOTE — PROGRESS NOTES
Spiritual Care Note    Patient Information     Patient's Name: Richard Hubbard II   MRN: [id number]    YOB: 1975   Age and Gender: 44 y.o. male   Service Area: GEN SURGERY [facility]   Room (and Bed): 2207/   Ethnicity or Nationality:     Primary Language: English   Baptist/Spiritual preference: Lutheran   Place of Residence: [place], [place]   Family/Friends/Others Present: no   Clinical Team Present: no   Medical Diagnosis(-es)/Procedure(s): Pelvic fracture   Code Status: Full Code    Date of Admission: 9/27/2019   Length of Stay: 12 days        Spiritual Care Provider Information:  Name of Spiritual Care Provider: Aleena Tavares  Title of Spiritual Care Provider: Associate   Phone Number: [phone]  E-mail: [email or web address]  Total time : 25 minutes    Spiritual Screen Results:    Gen Nursing  Spiritual Screen  Is your spiritual health or inner well-being important to you as you cope with your medical condition?: Yes  Would you like to receive a visit from our Spiritual Care team or your own Mandaeism or spiritual leader?: No  Was spiritual care education provided to the patient?: Declined     Palliative Care  PC Baptist/Spiritual Screening  Was spiritual care education provided to the patient?: Declined      Encounter/Request Information  Encounter/Request Type   Visited With: Patient  Nature of the Visit: Initial, On shift  Continue Visiting: Yes  Next Follow-up Date: 10/16/19  General Visit: Yes  Referral From/ Origin of Request: Epic nursing    Religous Needs/Values       Spiritual Assessment   Spiritual Care Encounters    Interacton/Conversation:  first went to visit pt, but PT was also waiting for pt.  came back at 5:30, then again at 5:50. Pt was on phone both times communicating with wife re:pt's placement after d/c. Pt stated wife had talked with ESTEFANY but pt had not so pt was distressed over plans that seemed to have changed for him.   offered to visit tomorrow. Pt was grateful and stated afternoon would be great.    Assessment: Distress  Distress: Anxiety about the Future    Interventions: Reflective Listening; Rescheduled visit    Outcomes: Value/Dignity/Respect, Ability to Communicate with Truth and Honesty    Plan: Other (Comment)(will f/u tomorrow)    Notes:

## 2019-10-16 NOTE — DISCHARGE PLANNING
Dayan from [facility] called to confirm that they will accept the pt tomorrow, she will call back in the morning with a transport time.  She said that she will be contacting the wife to notify her of the transfer.    The accepting MD will be Loi Pisano and the report line is [phone].

## 2019-10-16 NOTE — CONSULTS
Physical Medicine and Rehabilitation Consultation         Initial Consult      Date of Consultation: 10/15/2019  Consulting provider: Alexx Khan D.O.  Reason for consultation: pain control, assess for acute inpatient rehab appropriateness  Consulting physician: Dr Pena    Chief complaint: pain in the pelvis and right knee    HPI: Patient is a 44 y.o. male  with a past medical history of aortic root dilation and moderate aortic stenosis, PUD, rheumatoid arthritis on Humira and chronically immunosuppressed, admitted to [facility] [place] 2[place] on 9/13, with anemia and pelvic pain. He was scheduled for a total knee replacement on the right prior to this admission. He had CT here which showed left pubic ramus fracture and two large hematomas. 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 MRI spine with mild chronic compression fracture of T11 and paraspinal soft tissue contusions on the lumbar spine. He is WBAT for his pelvic fracture, managed non-operatively.  Patient found to have multiple fluid collections on imaging which were concerning for an abscess, and underwent CT-guided drainage on 10/2 with cultures growing MSSA.  Patient underwent surgical debridement on 10/9, pathology consistent with osteomyelitis, on IV antibiotics with stop date of 11/19/2019     He also reports systemic fungal infection over his chest as well as his groin.    He reports pain in the pelvis as aching progressing to sharp with increased sensitivity, 5-6/10, constant, worse with motion, no radiation, no lower extremity weakness, no other associated symptoms.  He was started on MS Contin and increase to 30 mg 3 times a day, with improvement in pain.  He reports pain previously was at a 9/10.  Pain is also helped by oxycodone as needed    He also reports pain from the rheumatoid arthritis in bilateral feet as well as right knee.  He describes this pain as dull and achy, increased with  weightbearing.    ROS:  Gen: No fatigue, confusion, significant weight loss  Eyes: no blurry vision, double vision or pain in eyes  ENT: no changes in hearing, runny nose, nose bleeds, sinus pain  Endo: no episodes of low blood sugar  CV: No CP, palpitations,   Lungs: no shortness of breath, changes in secretions, changes in cough, pain with coughing  Abd: No abd pain, nausea, vomiting, pain with eating  : no blood in urine, suprapubic pain  Ext/MSK: No swelling in legs, asymmetric atrophy  Neuro: no changes in strength or sensation  Skin: no new ulcers/skin breakdown appreciated by patient  Mood: No changes in mood today, increase in depression or anxiety  Heme: no bruising, or bleeding  Allergy: No recent allergies  Rest of 14 point review of systems is negative    PMH:  Past Medical History:   Diagnosis Date   • ADD (attention deficit disorder)    • Alcohol abuse    • Allergic rhinitis 5/21/2009   • Anemia 09/2019   • Anxiety 5/21/2009   • Back pain 5/21/2009   • Bipolar disorder (HCC)    • Bleeding ulcer 5/21/2009   • Depression 5/21/2009   • Eczema 5/21/2009   • History of bleeding ulcers 2/12/2015   • Hypertension    • Insomnia 5/21/2009   • Migraine 5/21/2009   • Obesity, morbid ([facility]) 5/21/2009   • Pain 09/2019    Chronic knee and back pain   • Rheumatoid arteritis (HCC) 09/2019    knee, feet   • Sleep apnea 5/21/2009    Did not tolerate cpap, does not use it   • Snoring        PSH:  Past Surgical History:   Procedure Laterality Date   • IRRIGATION & DEBRIDEMENT ORTHO Left 10/9/2019    Procedure: IRRIGATION AND DEBRIDEMENT, WOUND - PELVIC OSTEOMYELITIS;  Surgeon: You Olivares M.D.;  Location: SURGERY [facility];  Service: Orthopedics   • GASTRIC BYPASS LAPAROSCOPIC  2000    Lucila en y   • CHOLECYSTECTOMY  2000       FHX:  Family History   Problem Relation Age of Onset   • Hypertension Mother    • Arthritis Mother    • Depression Mother    • Cancer Father         bladder   • Schizophrenia Father   "  • Cancer Maternal Grandmother         breast   • Heart Disease Maternal Grandmother    • Psychiatric Illness Other    • Stroke Maternal Aunt    • Other Neg Hx         no connective tissue disorders or known aortic disease       Medications:  Current Facility-Administered Medications   Medication Dose   • morphine ER (MS CONTIN) tablet 30 mg  30 mg   • ceFAZolin (ANCEF) 2 g in  mL IVPB  2 g   • simethicone (MYLICON) chewable tab 80 mg  80 mg   • oxyCODONE immediate release (ROXICODONE) tablet 10 mg  10 mg   • enoxaparin (LOVENOX) inj 40 mg  40 mg   • miconazole (MICOTIN) 2 % cream     • vitamin D (cholecalciferol) tablet 1,000 Units  1,000 Units   • calcium citrate (CALCITRATE) tablet 950 mg  950 mg   • ferrous sulfate tablet 325 mg  325 mg   • furosemide (LASIX) tablet 20 mg  20 mg   • losartan (COZAAR) tablet 25 mg  25 mg   • omeprazole (PRILOSEC) capsule 40 mg  40 mg   • pregabalin (LYRICA) capsule 25 mg  25 mg   • senna-docusate (PERICOLACE or SENOKOT S) 8.6-50 MG per tablet 2 Tab  2 Tab    And   • polyethylene glycol/lytes (MIRALAX) PACKET 1 Packet  1 Packet    And   • magnesium hydroxide (MILK OF MAGNESIA) suspension 30 mL  30 mL    And   • bisacodyl (DULCOLAX) suppository 10 mg  10 mg   • acetaminophen (TYLENOL) tablet 650 mg  650 mg   • nicotine (NICODERM) 14 MG/24HR 14 mg  14 mg    And   • nicotine polacrilex (NICORETTE) 2 MG piece 2 mg  2 mg   • morphine (pf) 4 mg/ml injection 4 mg  4 mg       Allergies:  Allergies   Allergen Reactions   • Benadryl [Altaryl]      \"I get agitated\"   • Nsaids      Bleeding, \"I had a bleeding ulcer\"   • Phenergan [Promethazine Hcl]      \"I get very agitated\"   • Penicillins      \"Had some dizziness\"  Tolerated Unasyn 10/2019       Social Hx:  Pre-morbidly, this patient lived in a single level home with One steps to enter, with spouse.   Tobacco: +  Alcohol:none  Drugs: none    Prior level of function:   Independent    Current level of function:  Occupational Therapy " "Treatment completed with focus on ADLs, ADL transfers, patient education and upper extremity function.  Functional Status:  Pt agreeable to therapy.  MaxA socks in bed (does better with a sockaide).  Supine to sit supervised with HOB elevated to approx 60 degrees.  Pt needed to use BUE support to move LLE out of bed.  Seated EOB., Lisa gown change.  Sit to stand from EOB with Lisa (close CGA).  Lisa to walk to sink with FWW, slow magdalena, difficulty clearing feet 2/2 pain in LLE (groin) and R knee (needing knee replacement).  Able to tolerate standing supervised 5 min at sink for grooming, leaning over sink with arms propped on countertop.  Pt heavily reliant on BUE for support with mobility.  Pt able to walk 4-5 feet beyond the sink before needing to turn around and head back towards bedside chair.  Overall, pt tolerating activity better than he has in the past, still remains limited by pain and generalized weakness.     Physical Therapy treatment completed.   Bed Mobility:  Supine to Sit: Minimal Assist  Transfers: Sit to Stand: Minimal Assist(close guarding for safety)  Gait: Level Of Assist: Refused(just returned from taking a shower and is too fatigued) with Front-Wheel Walker         Physical Exam:  Vitals: /78   Pulse (!) 102   Temp 36.9 °C (98.4 °F) (Oral)   Resp 18   Ht 1.803 m (5' 11\")   Wt 99.6 kg (219 lb 9.3 oz)   SpO2 99%   Gen: NAD  HEENT:  NC/AT, PERRLA, moist mucous membranes  Cardio: RRR, no mumurs  Pulm: CTAB, with normal respiratory effort  Abd: Soft NTND, active bowel sounds,   Ext: No peripheral edema. No calf tenderness. No clubbing/cyanosis. +dorsal pedalis pulses bilaterally.  Tender to palpation over the left groin, anticipatory pain    Skin: Multiple areas of drying satellite lesions over the chest and bilateral groin, fungal infection    Mental status:  A&Ox4 (person, place, date, situation) answers questions appropriately follows commands  Speech: fluent, no aphasia or " dysarthria    CRANIAL NERVES:  2,3: visual acuity grossly intact, PERRL  3,4,6: EOMI bilaterally, no nystagmus or diplopia  5: sensation intact to light touch bilaterally and symmetric  7: no facial asymmetry  8: hearing grossly intact  9,10: symmetric palate elevation  11: SCM/Trapezius strength 5/5 bilaterally  12: tongue protrudes midline    Motor:  Upper extremities 5 out of 5  Lower extremities is limited secondary to pain    Sensory:   intact to light touch through out    DTRs: 2+ in bilateral biceps, triceps, brachioradialis, 2+ in bilateral patellar and achilles tendons  No clonus at bilateral ankles  Negative babinski b/l  Negative Vaz b/l       Labs:  Recent Labs     10/13/19  0400 10/14/19  0411 10/15/19  0958   RBC 3.18* 3.31* 3.31*   HEMOGLOBIN 8.3* 8.9* 8.8*   HEMATOCRIT 28.0* 29.4* 29.0*   PLATELETCT 356 377 339     Recent Labs     10/13/19  0400 10/14/19  0411 10/15/19  0927   SODIUM 139 139 139   POTASSIUM 4.3 4.3 4.4   CHLORIDE 101 103 102   CO2 27 25 26   GLUCOSE 92 102* 89   BUN 8 10 9   CREATININE 0.56 0.78 0.55   CALCIUM 8.7 8.8 8.9     Recent Results (from the past 24 hour(s))   Basic Metabolic Panel (BMP)    Collection Time: 10/15/19  9:27 AM   Result Value Ref Range    Sodium 139 135 - 145 mmol/L    Potassium 4.4 3.6 - 5.5 mmol/L    Chloride 102 96 - 112 mmol/L    Co2 26 20 - 33 mmol/L    Glucose 89 65 - 99 mg/dL    Bun 9 8 - 22 mg/dL    Creatinine 0.55 0.50 - 1.40 mg/dL    Calcium 8.9 8.4 - 10.2 mg/dL    Anion Gap 11.0 0.0 - 11.9   ESTIMATED GFR    Collection Time: 10/15/19  9:27 AM   Result Value Ref Range    GFR If African American >60 >60 mL/min/1.73 m 2    GFR If Non African American >60 >60 mL/min/1.73 m 2   CBC with Differential    Collection Time: 10/15/19  9:58 AM   Result Value Ref Range    WBC 5.3 4.8 - 10.8 K/uL    RBC 3.31 (L) 4.70 - 6.10 M/uL    Hemoglobin 8.8 (L) 14.0 - 18.0 g/dL    Hematocrit 29.0 (L) 42.0 - 52.0 %    MCV 87.6 81.4 - 97.8 fL    MCH 26.6 (L) 27.0 - 33.0 pg     MCHC 30.3 (L) 33.7 - 35.3 g/dL    RDW 62.2 (H) 35.9 - 50.0 fL    Platelet Count 339 164 - 446 K/uL    MPV 8.6 (L) 9.0 - 12.9 fL    Neutrophils-Polys 57.50 44.00 - 72.00 %    Lymphocytes 29.10 22.00 - 41.00 %    Monocytes 9.30 0.00 - 13.40 %    Eosinophils 1.90 0.00 - 6.90 %    Basophils 1.30 0.00 - 1.80 %    Immature Granulocytes 0.90 0.00 - 0.90 %    Nucleated RBC 0.00 /100 WBC    Neutrophils (Absolute) 3.04 1.82 - 7.42 K/uL    Lymphs (Absolute) 1.54 1.00 - 4.80 K/uL    Monos (Absolute) 0.49 0.00 - 0.85 K/uL    Eos (Absolute) 0.10 0.00 - 0.51 K/uL    Baso (Absolute) 0.07 0.00 - 0.12 K/uL    Immature Granulocytes (abs) 0.05 0.00 - 0.11 K/uL    NRBC (Absolute) 0.00 K/uL       ASSESSMENT:    Pelvic osteomyelitis: With pelvic fracture, managed nonoperatively, MSSA infection, patient underwent surgical debridement on 10/9  -Weightbearing as tolerated  - Ancef 2 g every 8 hours  -Would recommend discussing with infectious disease on transitioning to IV antibiotics which can be administered at home, daily/every 12 hour dosing  -Stop date for antibiotics of 11/19    Rheumatoid arthritis: Mainly affecting right knee bilateral feet and left wrist  -Unable to continue with Humira given above osteomyelitis  -Unable to start steroid as per infectious disease    Fungal infection:  - Miconazole 2% cream twice daily    Pain: Combination of osteomyelitis and pelvis right knee and rheumatoid arthritis pain present and bilateral feet and hands.  -Patient is on too high of a dose of opioids for discharge home, greater than 90 morphine Eq/day  -Currently on MS Contin 30 mg 3 times a day, oxycodone 10 mg every 4 hours as needed pain, only required 2 tablets today  -Recommend decreasing dose of MS Contin to 15 mg 3 times a day,   -Recommend starting Tylenol 1000 mg 3 times daily scheduled, LFTs within normal limits on 10/12   -Discontinue IV morphine  -Procedures limited by presence of osteomyelitis    -Consider increasing dose of  Lyrica to 50 mg 3 times a day    History of upper GI bleed: Limits ability to utilize anti-inflammatory medication, pending full work up by GI, HBG 8.8  - Omeprazole 40 mg twice daily    Rehabilitation: Impaired ADLs and mobility  -I do think this patient would benefit from acute rehabilitation, this would be the quickest way to transition this patient from the acute hospitalization to home.  - Barriers to acute rehab include alteration of IV antibiotics, vs recommendation to be done at time of discharge from acute rehabilitation, and ability to tolerate therapy with lower dose of opioids as above.  -We will closely monitor patient over next 24 hours    Discussed with pt, summarized hospitalization and care, options for next step of care    Discussed with team about recommendations      Discussed with patient about recommendations for and plan for rehabilitation as follows. Patient with multiple co-morbidities(including but not limited to pelvic osteomyelitis, abscess of left thigh, rheumatoid arthritis, systemic fungal infection, anemia secondary to blood loss, peptic ulcer disease, hypertension, lower extremity edema, chronic right knee pain); with functional deficits in mobility/self-care, and Severe de-conditioning.     Pre-morbidly, this patient lived in a single level home with One steps to enter, with spouse. The patient was evaluated by acute care Physical Therapy and Occupational Therapy; currently requiring moderate assistance for mobility and minimal assistance for ADLs deficits.     The patient is a(n) Very Good candidate for an acute inpatient rehabilitation program with a coordinated program of care at an intensity and frequency not available at a lower level of care.     Note: if patient continues to progress while waiting for medical clearance, and no longer requires 2 of of 3 therapy services (PT/OT/SLP) at a CGA/Minimal assistance level, patient will no longer need acute inpatient  rehabilitation.    This recommendation is substantiated by the patient's current medical condition with intervention and assessment of medical issues requiring an acute level of care for patient's safety and maximum outcome. A coordinated program of care will be provided by an interdisciplinary team including physical therapy, occupational therapy, hospitalist, physiatry, rehab nursing and rehab psychology. Rehab goals include improved , mobility, self-care management, strength and conditioning/endurance, pain management, bowel and bladder management, mood and affect, and safety with independent home management including caregiver training.     Estimated length of stay is approximately 10-14 days. Rehab potential: Very Good. Disposition: to pre-morbid independent living setting with supportive care of patient's spouse. We will continue to follow with you in anticipation of discharge to acute inpatient rehabilitation when medically stable to do so at the discretion of his  attending physician. Thank you for allowing us to participate in his care. Please call with any questions regarding this recommendation.    Patient was seen for 80 minutes on unit/floor of which > 50% of time was spent on counseling and coordination of care regarding the above, including prognosis, risk reduction, benefits of treatment, and options for next stage of care.      Alexx Khan D.O.  Physical Medicine and Rehabilitation

## 2019-10-16 NOTE — PROGRESS NOTES
[facility] Medicine Daily Progress Note    Date of Service  10/15/2019    Chief Complaint  44 y.o. male admitted 9/27/2019 with left thigh pain    Hospital Course    44 y.o. male who presented to the emergency department on 9/27/2019 for evaluation of worsening left leg pain.  Patient had a fall about a month ago.  Patient has a problem with his right knee, on schedule for total knee replacement and ever since walking with a cane or walker. He had increasing pain in the left thigh and CT scan showed a left pubic ramus fracture with two large hematomas. Orthopedic surgery did see the patient and recommended no surgical intervention.  The patient had a rising white cell count and increasing pain so hematoma drainage was done by interventional radiology and tiffanie pus was drained consistent with an abscess. MRI of the pelvis showed osteomyelitis and surgical debridement of the pelvic bone was done 10/9 with abscess culture and bone culture both growing MSSA. The patient had a flair of his rheumatoid arthritis but due to the infection and needing antibiotic therapy, immunosuppressive therapy was contraindicated.        Interval Problem Update  The patient has joint pain diffusely consistent with RA flair    Consultants/Specialty  Orthopedic surgery  ID    Code Status  full    Disposition  rehab    Review of Systems  Review of Systems   Constitutional: Negative for chills, diaphoresis, fever and malaise/fatigue.   HENT: Negative for congestion, sinus pain and sore throat.    Respiratory: Negative for cough, shortness of breath and wheezing.    Cardiovascular: Negative for chest pain, palpitations and leg swelling.   Gastrointestinal: Negative for abdominal pain, constipation, diarrhea and nausea.   Genitourinary: Negative for dysuria and urgency.   Musculoskeletal: Positive for joint pain and myalgias. Negative for back pain.   Skin: Negative for itching and rash.   Neurological: Positive for headaches. Negative for  dizziness and tremors.        Physical Exam  Temp:  [36.3 °C (97.4 °F)-36.9 °C (98.4 °F)] 36.9 °C (98.4 °F)  Pulse:  [] 102  Resp:  [18] 18  BP: (109-115)/(67-81) 115/78  SpO2:  [96 %-99 %] 99 %    Physical Exam   Constitutional: He is oriented to person, place, and time. No distress.   HENT:   Head: Normocephalic and atraumatic.   Mouth/Throat: Oropharynx is clear and moist.   Eyes: Conjunctivae are normal.   Neck: No JVD present.   Cardiovascular: Normal rate, regular rhythm and intact distal pulses.   No murmur heard.  Pulmonary/Chest: Effort normal and breath sounds normal. No stridor.   Abdominal: Soft. He exhibits no distension. There is no tenderness.   Abdominal incision dressing is clean, mild induration just under incision site   Musculoskeletal: He exhibits no edema.   Neurological: He is alert and oriented to person, place, and time.   Skin: Skin is dry. No rash noted. He is not diaphoretic. No erythema.   Psychiatric: Thought content normal.   Nursing note and vitals reviewed.      Fluids    Intake/Output Summary (Last 24 hours) at 10/15/2019 1846  Last data filed at 10/15/2019 1614  Gross per 24 hour   Intake 1040 ml   Output 2700 ml   Net -1660 ml       Laboratory  Recent Labs     10/13/19  0400 10/14/19  0411 10/15/19  0958   WBC 5.1 5.4 5.3   RBC 3.18* 3.31* 3.31*   HEMOGLOBIN 8.3* 8.9* 8.8*   HEMATOCRIT 28.0* 29.4* 29.0*   MCV 88.1 88.8 87.6   MCH 26.1* 26.9* 26.6*   MCHC 29.6* 30.3* 30.3*   RDW 63.7* 64.4* 62.2*   PLATELETCT 356 377 339   MPV 8.6* 8.7* 8.6*     Recent Labs     10/13/19  0400 10/14/19  0411 10/15/19  0927   SODIUM 139 139 139   POTASSIUM 4.3 4.3 4.4   CHLORIDE 101 103 102   CO2 27 25 26   GLUCOSE 92 102* 89   BUN 8 10 9   CREATININE 0.56 0.78 0.55   CALCIUM 8.7 8.8 8.9                   Imaging  IR-PICC LINE PLACEMENT W/ GUIDANCE > AGE 5   Final Result                  Ultrasound-guided PICC placement performed by qualified nursing staff as    above.           CT-ABDOMEN-PELVIS WITH   Final Result      1.  Interval decrease in the intramuscular abscesses involving the left pelvis and thigh following drain placement. Collection in the pubococcygeus muscle is similar in size to the prior exam.      2.  Ventral hernia containing anterior bowel wall and fat. No secondary obstruction.      US-EXTREMITY NON VASCULAR UNILATERAL RIGHT   Final Result      Loculated collection in the posteromedial left knee likely represents a Baker's cyst. No significant knee effusion is identified in the upper knee joint on recent MRI.      MR-FEMUR-W/O LEFT   Final Result      1.  Limited exam secondary to significant patient motion.   2.  Subacute fracture involving the left pubic bone with osteomyelitis and probable intraosseous abscess.   3.  Extensive edema and enlargement of the muscles of the medial compartment, pectineus muscle, and pubococcygeus muscle is consistent with known intramuscular abscesses. Fluid collection is most extensive in the adductor longus muscle.   4.  Subacute fractures of the sacrum               Comment: Results given to Dr. Varma through [place] text at 3:56 PM      MR-PELVIS W/O   Final Result      1.  Limited exam secondary to significant patient motion.   2.  Subacute fracture involving the left pubic bone with osteomyelitis and probable intraosseous abscess.   3.  Extensive edema and enlargement of the muscles of the medial compartment, pectineus muscle, and pubococcygeus muscle is consistent with known intramuscular abscesses. Fluid collection is most extensive in the adductor longus muscle.   4.  Subacute fractures of the sacrum               Comment: Results given to Dr. Varma through [place] text at 3:56 PM      CT-DRAIN-HEMATOMA - SEROMA   Final Result      1.  Left inguinal region muscular abscess.   2.  CT-guided drainage and catheter placement of large collection in the left obturator externus.   3.  CT-guided aspiration of smaller collection in the left  pectineus muscle.      US-RUQ   Final Result      Hepatomegaly.      Status post cholecystectomy.      Dilated portal vein.      Status post cholecystectomy.      No biliary ductal dilatation.      Limited evaluation of the pancreas which is obscured.      MR-THORACIC SPINE-W/O   Final Result      1.  No acute fracture in the thoracic spine.   2.  Mild chronic compression deformity at T11.      MR-LUMBAR SPINE-W/O   Final Result      1.  Acute bilateral sacral alae fractures.   2.  There is edema in the lower posterior paraspinal soft tissue likely representing soft tissue contusion.   3.  There is no lumbar spine fractures.         CT-LSPINE W/O PLUS RECONS   Final Result      No acute fracture or traumatic malalignment.      CT-PELVIS WITH   Final Result      1.  Recent but probably subacute fracture of the left superior pubic ramus near the symphysis. Adjacent and just lateral to this are two complex masslike structures, the largest and most medial of which contains a fluid-filled cavity. These are    consistent with hematomata, possibly intramuscular.      US-EXTREMITY VENOUS LOWER UNILAT LEFT   Final Result      1.  No evidence of Left  lower extremity deep venous thrombosis.   2.  Two complex areas within the left groin, consistent with hematomata.            INTERPRETING LOCATION: [address] [facility], [facility], [address]           Assessment/Plan  * Pubic ramus fracture (HCC)  Assessment & Plan  S/p mechanical GLF  MS Continue 30mg TID now, with oxycodone for breakthrough  Continue PT/OT rehab placement pending    Osteomyelitis (HCC)  Assessment & Plan  MRI femus shows Subacute fracture involving the left pubic bone with osteomyelitis surgical debridement was done 10/9  Ancef to continue for MSSA inculture        Candida rash of groin  Assessment & Plan  Resolved.      Sepsis (HCC)  Assessment & Plan  Resolved, not present on admission      Abscess of left thigh  Assessment & Plan  Multiple abscesses found on imaging  CT/MRI  Cultures from abscess and bone have grown MSSA  Continue with IV Ancef Stop Date 11/19/2019  ID is consulting    Hematoma of left thigh  Assessment & Plan        Bilateral leg edema  Assessment & Plan  Improved currently, likely recurrences related to low albumin    Iron deficiency anemia, unspecified iron deficiency anemia type- (present on admission)  Assessment & Plan  Stable s/p iv iron        Essential hypertension- (present on admission)  Assessment & Plan  Controlled on Coreg, Losartan and lasix.     Chronic pain of right knee- (present on admission)  Assessment & Plan  Chronically on MS contin plus short acting oxycodone for breakthrough pain  Pain is worse with rheumatoid arthritis flair currently, I discussed this with rheumatology Dr. Conway today who recommends starting prednisone at 40mg daily with a 10mg taper every 3 days down to 5mg daily for 3 days then stop.   Due to acute infection, after discussion with ID, will hold on prednisone for now due to active infection      Rheumatoid arthritis (HCC)- (present on admission)  Assessment & Plan  Will use prednisone as suggested by Dr. Conway once cleared by ID         VTE prophylaxis: lovenox

## 2019-10-17 ENCOUNTER — HOSPITAL ENCOUNTER (INPATIENT)
Facility: REHABILITATION | Age: 44
LOS: 16 days | DRG: 560 | End: 2019-11-02
Attending: PHYSICAL MEDICINE & REHABILITATION | Admitting: PHYSICAL MEDICINE & REHABILITATION
Payer: COMMERCIAL

## 2019-10-17 VITALS
OXYGEN SATURATION: 98 % | WEIGHT: 219.58 LBS | BODY MASS INDEX: 30.74 KG/M2 | HEART RATE: 75 BPM | HEIGHT: 71 IN | TEMPERATURE: 97.9 F | SYSTOLIC BLOOD PRESSURE: 117 MMHG | RESPIRATION RATE: 16 BRPM | DIASTOLIC BLOOD PRESSURE: 86 MMHG

## 2019-10-17 DIAGNOSIS — G89.29 CHRONIC KNEE PAIN, UNSPECIFIED LATERALITY: ICD-10-CM

## 2019-10-17 DIAGNOSIS — L02.416 ABSCESS OF LEFT THIGH: ICD-10-CM

## 2019-10-17 DIAGNOSIS — G89.29 CHRONIC PAIN OF RIGHT KNEE: Chronic | ICD-10-CM

## 2019-10-17 DIAGNOSIS — M05.741 RHEUMATOID ARTHRITIS INVOLVING BOTH HANDS WITH POSITIVE RHEUMATOID FACTOR (HCC): Chronic | ICD-10-CM

## 2019-10-17 DIAGNOSIS — M05.742 RHEUMATOID ARTHRITIS INVOLVING BOTH HANDS WITH POSITIVE RHEUMATOID FACTOR (HCC): Chronic | ICD-10-CM

## 2019-10-17 DIAGNOSIS — G89.29 CHRONIC MIDLINE LOW BACK PAIN WITHOUT SCIATICA: ICD-10-CM

## 2019-10-17 DIAGNOSIS — M06.9 RHEUMATOID ARTHRITIS INVOLVING MULTIPLE SITES, UNSPECIFIED RHEUMATOID FACTOR PRESENCE: Chronic | ICD-10-CM

## 2019-10-17 DIAGNOSIS — S32.512A CLOSED FRACTURE OF LEFT SUPERIOR PUBIC RAMUS, INITIAL ENCOUNTER: ICD-10-CM

## 2019-10-17 DIAGNOSIS — M17.10 ARTHRITIS OF KNEE: ICD-10-CM

## 2019-10-17 DIAGNOSIS — M86.10 OTHER ACUTE OSTEOMYELITIS, UNSPECIFIED SITE (HCC): ICD-10-CM

## 2019-10-17 DIAGNOSIS — S32.592A CLOSED FRACTURE OF RAMUS OF LEFT PUBIS, INITIAL ENCOUNTER (HCC): ICD-10-CM

## 2019-10-17 DIAGNOSIS — M54.50 CHRONIC MIDLINE LOW BACK PAIN WITHOUT SCIATICA: ICD-10-CM

## 2019-10-17 DIAGNOSIS — M25.569 CHRONIC KNEE PAIN, UNSPECIFIED LATERALITY: ICD-10-CM

## 2019-10-17 DIAGNOSIS — M25.561 CHRONIC PAIN OF RIGHT KNEE: Chronic | ICD-10-CM

## 2019-10-17 PROBLEM — S70.12XA HEMATOMA OF LEFT THIGH: Status: RESOLVED | Noted: 2019-10-01 | Resolved: 2019-10-17

## 2019-10-17 PROBLEM — R11.2 NAUSEA AND VOMITING: Status: RESOLVED | Noted: 2019-10-16 | Resolved: 2019-10-17

## 2019-10-17 PROBLEM — B37.89 CANDIDA RASH OF GROIN: Status: RESOLVED | Noted: 2019-10-03 | Resolved: 2019-10-17

## 2019-10-17 PROBLEM — A41.9 SEPSIS (HCC): Status: RESOLVED | Noted: 2019-10-02 | Resolved: 2019-10-17

## 2019-10-17 PROBLEM — R60.0 BILATERAL LEG EDEMA: Status: RESOLVED | Noted: 2019-10-01 | Resolved: 2019-10-17

## 2019-10-17 PROCEDURE — 700105 HCHG RX REV CODE 258: Performed by: INTERNAL MEDICINE

## 2019-10-17 PROCEDURE — 700102 HCHG RX REV CODE 250 W/ 637 OVERRIDE(OP): Performed by: INTERNAL MEDICINE

## 2019-10-17 PROCEDURE — 770010 HCHG ROOM/CARE - REHAB SEMI PRIVAT*

## 2019-10-17 PROCEDURE — 3E02340 INTRODUCTION OF INFLUENZA VACCINE INTO MUSCLE, PERCUTANEOUS APPROACH: ICD-10-PCS | Performed by: PHYSICAL MEDICINE & REHABILITATION

## 2019-10-17 PROCEDURE — 700102 HCHG RX REV CODE 250 W/ 637 OVERRIDE(OP): Performed by: PHYSICAL MEDICINE & REHABILITATION

## 2019-10-17 PROCEDURE — A9270 NON-COVERED ITEM OR SERVICE: HCPCS | Performed by: PHYSICAL MEDICINE & REHABILITATION

## 2019-10-17 PROCEDURE — 99239 HOSP IP/OBS DSCHRG MGMT >30: CPT | Performed by: INTERNAL MEDICINE

## 2019-10-17 PROCEDURE — 700111 HCHG RX REV CODE 636 W/ 250 OVERRIDE (IP): Performed by: INTERNAL MEDICINE

## 2019-10-17 PROCEDURE — 99223 1ST HOSP IP/OBS HIGH 75: CPT | Performed by: PHYSICAL MEDICINE & REHABILITATION

## 2019-10-17 PROCEDURE — 700102 HCHG RX REV CODE 250 W/ 637 OVERRIDE(OP): Performed by: HOSPITALIST

## 2019-10-17 PROCEDURE — 94760 N-INVAS EAR/PLS OXIMETRY 1: CPT

## 2019-10-17 PROCEDURE — A9270 NON-COVERED ITEM OR SERVICE: HCPCS | Performed by: HOSPITALIST

## 2019-10-17 PROCEDURE — 90686 IIV4 VACC NO PRSV 0.5 ML IM: CPT | Performed by: PHYSICAL MEDICINE & REHABILITATION

## 2019-10-17 PROCEDURE — A9270 NON-COVERED ITEM OR SERVICE: HCPCS | Performed by: INTERNAL MEDICINE

## 2019-10-17 PROCEDURE — 700111 HCHG RX REV CODE 636 W/ 250 OVERRIDE (IP): Performed by: PHYSICAL MEDICINE & REHABILITATION

## 2019-10-17 PROCEDURE — 99232 SBSQ HOSP IP/OBS MODERATE 35: CPT | Performed by: INTERNAL MEDICINE

## 2019-10-17 PROCEDURE — 700105 HCHG RX REV CODE 258: Performed by: PHYSICAL MEDICINE & REHABILITATION

## 2019-10-17 PROCEDURE — 306589 SLEEVE,VASO CALF LARGE: Performed by: PHYSICAL MEDICINE & REHABILITATION

## 2019-10-17 RX ORDER — IBUPROFEN 200 MG
950 CAPSULE ORAL DAILY
Status: DISCONTINUED | OUTPATIENT
Start: 2019-10-18 | End: 2019-11-02 | Stop reason: HOSPADM

## 2019-10-17 RX ORDER — NICOTINE 21 MG/24HR
14 PATCH, TRANSDERMAL 24 HOURS TRANSDERMAL
Status: CANCELLED | OUTPATIENT
Start: 2019-10-18

## 2019-10-17 RX ORDER — ALUMINA, MAGNESIA, AND SIMETHICONE 2400; 2400; 240 MG/30ML; MG/30ML; MG/30ML
20 SUSPENSION ORAL
Status: DISCONTINUED | OUTPATIENT
Start: 2019-10-17 | End: 2019-11-02 | Stop reason: HOSPADM

## 2019-10-17 RX ORDER — ONDANSETRON 2 MG/ML
4 INJECTION INTRAMUSCULAR; INTRAVENOUS 4 TIMES DAILY PRN
Status: DISCONTINUED | OUTPATIENT
Start: 2019-10-17 | End: 2019-11-02 | Stop reason: HOSPADM

## 2019-10-17 RX ORDER — IBUPROFEN 200 MG
950 CAPSULE ORAL DAILY
Qty: 30 TAB | Status: ON HOLD
Start: 2019-10-18 | End: 2019-11-01

## 2019-10-17 RX ORDER — OMEPRAZOLE 20 MG/1
40 CAPSULE, DELAYED RELEASE ORAL 2 TIMES DAILY
Status: DISCONTINUED | OUTPATIENT
Start: 2019-10-17 | End: 2019-11-02 | Stop reason: HOSPADM

## 2019-10-17 RX ORDER — OXYCODONE HYDROCHLORIDE 10 MG/1
10 TABLET ORAL
Status: DISCONTINUED | OUTPATIENT
Start: 2019-10-17 | End: 2019-10-21

## 2019-10-17 RX ORDER — MORPHINE SULFATE 15 MG/1
15 TABLET, FILM COATED, EXTENDED RELEASE ORAL 3 TIMES DAILY
Qty: 60 TAB | Status: ON HOLD
Start: 2019-10-17 | End: 2019-11-01

## 2019-10-17 RX ORDER — OMEPRAZOLE 20 MG/1
40 CAPSULE, DELAYED RELEASE ORAL 2 TIMES DAILY
Status: CANCELLED | OUTPATIENT
Start: 2019-10-17

## 2019-10-17 RX ORDER — OMEPRAZOLE 40 MG/1
40 CAPSULE, DELAYED RELEASE ORAL 2 TIMES DAILY
Qty: 30 CAP | Status: ON HOLD
Start: 2019-10-17 | End: 2019-11-01 | Stop reason: SDUPTHER

## 2019-10-17 RX ORDER — LOSARTAN POTASSIUM 25 MG/1
25 TABLET ORAL DAILY
Status: DISCONTINUED | OUTPATIENT
Start: 2019-10-18 | End: 2019-11-02 | Stop reason: HOSPADM

## 2019-10-17 RX ORDER — POLYVINYL ALCOHOL 14 MG/ML
1 SOLUTION/ DROPS OPHTHALMIC PRN
Status: DISCONTINUED | OUTPATIENT
Start: 2019-10-17 | End: 2019-11-02 | Stop reason: HOSPADM

## 2019-10-17 RX ORDER — FERROUS SULFATE 325(65) MG
325 TABLET ORAL DAILY
Status: CANCELLED | OUTPATIENT
Start: 2019-10-18

## 2019-10-17 RX ORDER — PREGABALIN 25 MG/1
25 CAPSULE ORAL 3 TIMES DAILY
Status: CANCELLED | OUTPATIENT
Start: 2019-10-17

## 2019-10-17 RX ORDER — FUROSEMIDE 20 MG/1
20 TABLET ORAL
Status: CANCELLED | OUTPATIENT
Start: 2019-10-17

## 2019-10-17 RX ORDER — PREGABALIN 25 MG/1
25 CAPSULE ORAL 3 TIMES DAILY
Status: DISCONTINUED | OUTPATIENT
Start: 2019-10-17 | End: 2019-10-18

## 2019-10-17 RX ORDER — TRAMADOL HYDROCHLORIDE 50 MG/1
50 TABLET ORAL EVERY 4 HOURS PRN
Status: DISCONTINUED | OUTPATIENT
Start: 2019-10-17 | End: 2019-11-02 | Stop reason: HOSPADM

## 2019-10-17 RX ORDER — POLYETHYLENE GLYCOL 3350 17 G/17G
1 POWDER, FOR SOLUTION ORAL
Status: DISCONTINUED | OUTPATIENT
Start: 2019-10-17 | End: 2019-10-29

## 2019-10-17 RX ORDER — ACETAMINOPHEN 500 MG
1000 TABLET ORAL 3 TIMES DAILY
Status: CANCELLED | OUTPATIENT
Start: 2019-10-17

## 2019-10-17 RX ORDER — SIMETHICONE 80 MG
80 TABLET,CHEWABLE ORAL 3 TIMES DAILY PRN
Status: CANCELLED | OUTPATIENT
Start: 2019-10-17

## 2019-10-17 RX ORDER — LOSARTAN POTASSIUM 25 MG/1
25 TABLET ORAL DAILY
Qty: 30 TAB | Status: ON HOLD
Start: 2019-10-18 | End: 2019-11-01 | Stop reason: SDUPTHER

## 2019-10-17 RX ORDER — ONDANSETRON 4 MG/1
4 TABLET, ORALLY DISINTEGRATING ORAL EVERY 8 HOURS PRN
Status: ON HOLD
Start: 2019-10-17 | End: 2019-11-01

## 2019-10-17 RX ORDER — NICOTINE 21 MG/24HR
1 PATCH, TRANSDERMAL 24 HOURS TRANSDERMAL EVERY 24 HOURS
Qty: 30 PATCH | Status: ON HOLD
Start: 2019-10-17 | End: 2019-11-01

## 2019-10-17 RX ORDER — ACETAMINOPHEN 325 MG/1
650 TABLET ORAL EVERY 4 HOURS PRN
Status: DISCONTINUED | OUTPATIENT
Start: 2019-10-17 | End: 2019-11-02 | Stop reason: HOSPADM

## 2019-10-17 RX ORDER — AMOXICILLIN 250 MG
2 CAPSULE ORAL 2 TIMES DAILY
Qty: 30 TAB | Refills: 0 | Status: ON HOLD
Start: 2019-10-17 | End: 2019-11-01

## 2019-10-17 RX ORDER — FUROSEMIDE 20 MG/1
20 TABLET ORAL 2 TIMES DAILY
Qty: 60 TAB | Status: ON HOLD
Start: 2019-10-17 | End: 2019-11-01

## 2019-10-17 RX ORDER — SIMETHICONE 80 MG
80 TABLET,CHEWABLE ORAL 3 TIMES DAILY PRN
Status: DISCONTINUED | OUTPATIENT
Start: 2019-10-17 | End: 2019-11-02 | Stop reason: HOSPADM

## 2019-10-17 RX ORDER — FUROSEMIDE 20 MG/1
20 TABLET ORAL
Status: DISCONTINUED | OUTPATIENT
Start: 2019-10-17 | End: 2019-11-02 | Stop reason: HOSPADM

## 2019-10-17 RX ORDER — MORPHINE SULFATE 15 MG/1
15 TABLET, FILM COATED, EXTENDED RELEASE ORAL 3 TIMES DAILY
Status: DISCONTINUED | OUTPATIENT
Start: 2019-10-17 | End: 2019-11-02 | Stop reason: HOSPADM

## 2019-10-17 RX ORDER — TRAZODONE HYDROCHLORIDE 50 MG/1
50 TABLET ORAL
Status: DISCONTINUED | OUTPATIENT
Start: 2019-10-17 | End: 2019-11-02 | Stop reason: HOSPADM

## 2019-10-17 RX ORDER — PREDNISONE 20 MG/1
40 TABLET ORAL DAILY
Status: CANCELLED | OUTPATIENT
Start: 2019-10-18

## 2019-10-17 RX ORDER — BISACODYL 10 MG
10 SUPPOSITORY, RECTAL RECTAL
Status: DISCONTINUED | OUTPATIENT
Start: 2019-10-17 | End: 2019-10-29

## 2019-10-17 RX ORDER — MORPHINE SULFATE 15 MG/1
15 TABLET, FILM COATED, EXTENDED RELEASE ORAL 3 TIMES DAILY
Status: CANCELLED | OUTPATIENT
Start: 2019-10-17

## 2019-10-17 RX ORDER — HYDRALAZINE HYDROCHLORIDE 25 MG/1
25 TABLET, FILM COATED ORAL EVERY 8 HOURS PRN
Status: DISCONTINUED | OUTPATIENT
Start: 2019-10-17 | End: 2019-11-02 | Stop reason: HOSPADM

## 2019-10-17 RX ORDER — PREDNISONE 20 MG/1
40 TABLET ORAL DAILY
Status: DISCONTINUED | OUTPATIENT
Start: 2019-10-18 | End: 2019-10-25

## 2019-10-17 RX ORDER — OXYCODONE HYDROCHLORIDE 5 MG/1
5 TABLET ORAL
Status: DISCONTINUED | OUTPATIENT
Start: 2019-10-17 | End: 2019-10-21

## 2019-10-17 RX ORDER — ECHINACEA PURPUREA EXTRACT 125 MG
2 TABLET ORAL PRN
Status: DISCONTINUED | OUTPATIENT
Start: 2019-10-17 | End: 2019-11-02 | Stop reason: HOSPADM

## 2019-10-17 RX ORDER — ACETAMINOPHEN 500 MG
1000 TABLET ORAL 3 TIMES DAILY
Qty: 30 TAB | Refills: 0 | Status: ON HOLD
Start: 2019-10-17 | End: 2020-01-22

## 2019-10-17 RX ORDER — CLOTRIMAZOLE AND BETAMETHASONE DIPROPIONATE 10; .64 MG/G; MG/G
CREAM TOPICAL 2 TIMES DAILY
Status: DISCONTINUED | OUTPATIENT
Start: 2019-10-17 | End: 2019-10-22

## 2019-10-17 RX ORDER — SIMETHICONE 80 MG
80 TABLET,CHEWABLE ORAL 3 TIMES DAILY PRN
Qty: 30 TAB | Refills: 3 | Status: ON HOLD
Start: 2019-10-17 | End: 2019-11-01

## 2019-10-17 RX ORDER — ACETAMINOPHEN 500 MG
1000 TABLET ORAL 3 TIMES DAILY
Status: DISCONTINUED | OUTPATIENT
Start: 2019-10-17 | End: 2019-10-21

## 2019-10-17 RX ORDER — AMOXICILLIN 250 MG
2 CAPSULE ORAL 2 TIMES DAILY
Status: DISCONTINUED | OUTPATIENT
Start: 2019-10-17 | End: 2019-10-29

## 2019-10-17 RX ORDER — FERROUS SULFATE 325(65) MG
325 TABLET ORAL DAILY
Status: DISCONTINUED | OUTPATIENT
Start: 2019-10-18 | End: 2019-11-02 | Stop reason: HOSPADM

## 2019-10-17 RX ORDER — ONDANSETRON 4 MG/1
4 TABLET, ORALLY DISINTEGRATING ORAL 4 TIMES DAILY PRN
Status: DISCONTINUED | OUTPATIENT
Start: 2019-10-17 | End: 2019-11-02 | Stop reason: HOSPADM

## 2019-10-17 RX ORDER — PREDNISONE 20 MG/1
TABLET ORAL
Qty: 20 TAB | Refills: 0 | Status: ON HOLD
Start: 2019-10-18 | End: 2019-11-01 | Stop reason: SDUPTHER

## 2019-10-17 RX ORDER — LOSARTAN POTASSIUM 25 MG/1
25 TABLET ORAL DAILY
Status: CANCELLED | OUTPATIENT
Start: 2019-10-18

## 2019-10-17 RX ORDER — NICOTINE 21 MG/24HR
14 PATCH, TRANSDERMAL 24 HOURS TRANSDERMAL
Status: DISCONTINUED | OUTPATIENT
Start: 2019-10-18 | End: 2019-10-25

## 2019-10-17 RX ORDER — IBUPROFEN 200 MG
950 CAPSULE ORAL DAILY
Status: CANCELLED | OUTPATIENT
Start: 2019-10-18

## 2019-10-17 RX ADMIN — ONDANSETRON 4 MG: 4 TABLET, ORALLY DISINTEGRATING ORAL at 08:45

## 2019-10-17 RX ADMIN — OMEPRAZOLE 40 MG: 20 CAPSULE, DELAYED RELEASE ORAL at 05:13

## 2019-10-17 RX ADMIN — OXYCODONE HYDROCHLORIDE 10 MG: 10 TABLET ORAL at 10:52

## 2019-10-17 RX ADMIN — PREGABALIN 25 MG: 25 CAPSULE ORAL at 21:07

## 2019-10-17 RX ADMIN — OMEPRAZOLE 40 MG: 20 CAPSULE, DELAYED RELEASE ORAL at 21:06

## 2019-10-17 RX ADMIN — SENNOSIDES AND DOCUSATE SODIUM 2 TABLET: 8.6; 5 TABLET ORAL at 21:07

## 2019-10-17 RX ADMIN — MORPHINE SULFATE 15 MG: 15 TABLET, FILM COATED, EXTENDED RELEASE ORAL at 21:07

## 2019-10-17 RX ADMIN — MICONAZOLE NITRATE: 20 CREAM TOPICAL at 05:14

## 2019-10-17 RX ADMIN — CEFAZOLIN 2 G: 10 INJECTION, POWDER, FOR SOLUTION INTRAVENOUS at 05:15

## 2019-10-17 RX ADMIN — INFLUENZA A VIRUS A/BRISBANE/02/2018 IVR-190 (H1N1) ANTIGEN (FORMALDEHYDE INACTIVATED), INFLUENZA A VIRUS A/KANSAS/14/2017 X-327 (H3N2) ANTIGEN (FORMALDEHYDE INACTIVATED), INFLUENZA B VIRUS B/PHUKET/3073/2013 ANTIGEN (FORMALDEHYDE INACTIVATED), AND INFLUENZA B VIRUS B/MARYLAND/15/2016 BX-69A ANTIGEN (FORMALDEHYDE INACTIVATED) 0.5 ML: 15; 15; 15; 15 INJECTION, SUSPENSION INTRAMUSCULAR at 17:53

## 2019-10-17 RX ADMIN — CALCIUM CITRATE 200 MG (950 MG) TABLET 950 MG: at 05:13

## 2019-10-17 RX ADMIN — FERROUS SULFATE TAB 325 MG (65 MG ELEMENTAL FE) 325 MG: 325 (65 FE) TAB at 05:14

## 2019-10-17 RX ADMIN — VITAMIN D, TAB 1000IU (100/BT) 1000 UNITS: 25 TAB at 05:13

## 2019-10-17 RX ADMIN — PREGABALIN 25 MG: 25 CAPSULE ORAL at 05:13

## 2019-10-17 RX ADMIN — CEFAZOLIN 2 G: 10 INJECTION, POWDER, FOR SOLUTION INTRAVENOUS at 22:25

## 2019-10-17 RX ADMIN — NICOTINE 14 MG: 14 PATCH, EXTENDED RELEASE TRANSDERMAL at 05:14

## 2019-10-17 RX ADMIN — MAGNESIUM HYDROXIDE 30 ML: 400 SUSPENSION ORAL at 08:58

## 2019-10-17 RX ADMIN — OXYCODONE HYDROCHLORIDE 10 MG: 10 TABLET ORAL at 00:34

## 2019-10-17 RX ADMIN — PREGABALIN 25 MG: 25 CAPSULE ORAL at 14:22

## 2019-10-17 RX ADMIN — ENOXAPARIN SODIUM 40 MG: 100 INJECTION SUBCUTANEOUS at 05:15

## 2019-10-17 RX ADMIN — CEFAZOLIN 2 G: 10 INJECTION, POWDER, FOR SOLUTION INTRAVENOUS at 14:05

## 2019-10-17 RX ADMIN — ACETAMINOPHEN 1000 MG: 500 TABLET, FILM COATED ORAL at 05:14

## 2019-10-17 RX ADMIN — OXYCODONE HYDROCHLORIDE 10 MG: 10 TABLET ORAL at 20:55

## 2019-10-17 RX ADMIN — MORPHINE SULFATE 15 MG: 15 TABLET, FILM COATED, EXTENDED RELEASE ORAL at 14:23

## 2019-10-17 RX ADMIN — MORPHINE SULFATE 15 MG: 15 TABLET, EXTENDED RELEASE ORAL at 05:13

## 2019-10-17 RX ADMIN — ACETAMINOPHEN 1000 MG: 500 TABLET, FILM COATED ORAL at 14:22

## 2019-10-17 RX ADMIN — CLOTRIMAZOLE AND BETAMETHASONE DIPROPIONATE: 10; .5 CREAM TOPICAL at 22:16

## 2019-10-17 RX ADMIN — OXYCODONE HYDROCHLORIDE 10 MG: 10 TABLET ORAL at 06:46

## 2019-10-17 RX ADMIN — LOSARTAN POTASSIUM 25 MG: 25 TABLET, FILM COATED ORAL at 05:14

## 2019-10-17 RX ADMIN — ACETAMINOPHEN 1000 MG: 500 TABLET, FILM COATED ORAL at 21:05

## 2019-10-17 RX ADMIN — ONDANSETRON 4 MG: 4 TABLET, ORALLY DISINTEGRATING ORAL at 22:15

## 2019-10-17 RX ADMIN — SENNOSIDES, DOCUSATE SODIUM 2 TABLET: 50; 8.6 TABLET, FILM COATED ORAL at 05:13

## 2019-10-17 RX ADMIN — ONDANSETRON 4 MG: 4 TABLET, ORALLY DISINTEGRATING ORAL at 15:30

## 2019-10-17 RX ADMIN — OXYCODONE HYDROCHLORIDE 10 MG: 10 TABLET ORAL at 15:22

## 2019-10-17 RX ADMIN — PREDNISONE 40 MG: 20 TABLET ORAL at 05:13

## 2019-10-17 ASSESSMENT — LIFESTYLE VARIABLES
TOTAL SCORE: 0
ALCOHOL_USE: NO
EVER_SMOKED: YES
DOES PATIENT WANT TO STOP DRINKING: NO
EVER FELT BAD OR GUILTY ABOUT YOUR DRINKING: NO
AVERAGE NUMBER OF DAYS PER WEEK YOU HAVE A DRINK CONTAINING ALCOHOL: 0
ON A TYPICAL DAY WHEN YOU DRINK ALCOHOL HOW MANY DRINKS DO YOU HAVE: 0
HAVE YOU EVER FELT YOU SHOULD CUT DOWN ON YOUR DRINKING: NO
HOW MANY TIMES IN THE PAST YEAR HAVE YOU HAD 5 OR MORE DRINKS IN A DAY: 0
EVER HAD A DRINK FIRST THING IN THE MORNING TO STEADY YOUR NERVES TO GET RID OF A HANGOVER: NO
CONSUMPTION TOTAL: NEGATIVE
HAVE PEOPLE ANNOYED YOU BY CRITICIZING YOUR DRINKING: NO
TOTAL SCORE: 0
TOTAL SCORE: 0

## 2019-10-17 ASSESSMENT — ENCOUNTER SYMPTOMS
DIZZINESS: 0
HEADACHES: 0
FEVER: 0
ABDOMINAL PAIN: 1
DIARRHEA: 0
NAUSEA: 0
VOMITING: 0
CHILLS: 0
ROS GI COMMENTS: IMPROVED
CONSTIPATION: 1
MYALGIAS: 1

## 2019-10-17 ASSESSMENT — PATIENT HEALTH QUESTIONNAIRE - PHQ9
1. LITTLE INTEREST OR PLEASURE IN DOING THINGS: NOT AT ALL
2. FEELING DOWN, DEPRESSED, IRRITABLE, OR HOPELESS: NOT AT ALL
SUM OF ALL RESPONSES TO PHQ9 QUESTIONS 1 AND 2: 0
SUM OF ALL RESPONSES TO PHQ9 QUESTIONS 1 AND 2: 0
2. FEELING DOWN, DEPRESSED, IRRITABLE, OR HOPELESS: NOT AT ALL

## 2019-10-17 ASSESSMENT — COPD QUESTIONNAIRES
COPD SCREENING SCORE: 2
HAVE YOU SMOKED AT LEAST 100 CIGARETTES IN YOUR ENTIRE LIFE: YES
DURING THE PAST 4 WEEKS HOW MUCH DID YOU FEEL SHORT OF BREATH: NONE/LITTLE OF THE TIME
DO YOU EVER COUGH UP ANY MUCUS OR PHLEGM?: NO/ONLY WITH OCCASIONAL COLDS OR INFECTIONS

## 2019-10-17 NOTE — ASSESSMENT & PLAN NOTE
After eating meals, started last night after dinner   Will order scheduled zofran, continue prilosec, taper ms contin  Last BM was 10/14, continue bowel regimen

## 2019-10-17 NOTE — PREADMISSION SCREENING NOTE
"  Pre-Admission Screening Form    Patient Information:   Name: Richard Hubbard II     MRN: [id number]       : 1975      Age: 44 y.o.   Gender: male      Race: White [7]       Marital Status:  [2]  Family Contact: Katiana Hubbard Linda        Relationship: Spouse [17]  Mother [8]  Home Phone: [phone]           Cell Phone: [phone]    Advanced Directives: None  Code Status:  FULL  Current Attending Provider: Rhina Varma M.D.  Referring Physician:       Physiatrist Consult: Dr. Correa/Bill        Referral Date: 10/04/2019  Primary Payor Source:  [facility]  Secondary Payor Source:      Medical Information:   Date of Admission to Acute Care [place]2019  Room Number: 2207/00  Rehabilitation Diagnosis: 08.3 Pelvic Fracture  Immunization History   Administered Date(s) Administered   • Influenza Vaccine Quad Inj (Pf) 2017, 2018   • Influenza Vaccine Quad Inj (Preserved) 2015, 2016     Allergies   Allergen Reactions   • Benadryl [Altaryl]      \"I get agitated\"   • Nsaids      Bleeding, \"I had a bleeding ulcer\"   • Phenergan [Promethazine Hcl]      \"I get very agitated\"   • Penicillins      \"Had some dizziness\"  Tolerated Unasyn 10/2019     Past Medical History:   Diagnosis Date   • ADD (attention deficit disorder)    • Alcohol abuse    • Allergic rhinitis 2009   • Anemia 2019   • Anxiety 2009   • Back pain 2009   • Bipolar disorder (HCC)    • Bleeding ulcer 2009   • Depression 2009   • Eczema 2009   • History of bleeding ulcers 2015   • Hypertension    • Insomnia 2009   • Migraine 2009   • Obesity, morbid (HCC) 2009   • Pain 2019    Chronic knee and back pain   • Rheumatoid arteritis (HCC) 2019    knee, feet   • Sleep apnea 2009    Did not tolerate cpap, does not use it   • Snoring      Past Surgical History:   Procedure Laterality Date   • IRRIGATION & DEBRIDEMENT " ORTHO Left 10/9/2019    Procedure: IRRIGATION AND DEBRIDEMENT, WOUND - PELVIC OSTEOMYELITIS;  Surgeon: You Olivares M.D.;  Location: SURGERY [facility];  Service: Orthopedics   • GASTRIC BYPASS LAPAROSCOPIC  2000    Lucila en y   • CHOLECYSTECTOMY  2000       History Leading to Admission, Conditions that Caused the Need for Rehab (CMS):     Israel Pena M.D.   Physician   [facility] Medicine   H&P   Signed   Date of Service:  9/13/2019  5:06 PM          Related encounter: ED to Hosp-Admission (Discharged) from 9/13/2019 in GEN SURGERY [facility] Medicine History & Physical Note     Date of Service  9/13/2019     Primary Care Physician  MARTÍN Turner     Consultants  None     Code Status  Full Code     Chief Complaint       Chief Complaint   Patient presents with   • Abnormal Labs       sent here by doctor for low hemoglobin         History of Presenting Illness  Stevie is a very pleasant 44 y.o. male with a past medical history of anemia, RA,  presented to the emergency room on 9/13/2019 for anemia and low hemoglobin.  Patient apparently recently underwent an EGD after having an upper GI bleed, and per wife who was in APRN said that they did not cauterize anything as the bleeding had stopped.  Patient does have a history of Lucila-en-Y bypass.  As a result of his GI bleeding he was started on a PPI.  Since then patient was in usual state of health, however his hemoglobin was noted to be in 6.9.  She is concerned that he has a right TKA scheduled for next week.  He is also been feeling weak and fatigued overall.  At this point he is here for a blood transfusion.  Otherwise denies chest pain shortness of breath or nausea vomiting.           Assessment/Plan:  I anticipate this patient is appropriate for observation status at this time.     Elevated alkaline phosphatase level  Assessment & Plan  Unknown significance, denies abdominal  pain  Repeat CMP in the am or as outpatient      Essential hypertension  Assessment & Plan  Stable,  We will resume home dose of losartan and Lasix     Aortic root dilatation (HCC)  Assessment & Plan  Hx of recent echocardiogram which showed root diameter 3.9cm, stable resume follow up.      Iron deficiency anemia, unspecified iron deficiency anemia type- (present on admission)  Assessment & Plan  Recently had EGD on 8/27 with GI constualnts, patient apparently had a bleeding ulcer which was cauterized.  Continue with ferrous sulfate supplementation.  Currently hemoglobin is borderline 6.9-7.0  1 unit PRBC will be transfused     Obesity (BMI 35.0-39.9 without comorbidity) ([facility])- (present on admission)  Assessment & Plan  Body mass index is 32.65 kg/m².        Rheumatoid arthritis ([facility])- (present on admission)  Assessment & Plan  Worsening per patient,  Patient is on Humira, follows  Rheumatology        MONICA (obstructive sleep apnea)- (present on admission)  Assessment & Plan  Resume home nightly CPAP        VTE prophylaxis: Prophylaxis: SCDs              You Olivares M.D.   Physician   Surgery Orthopedic   Consults   Signed   Date of Service:  9/28/2019  6:54 AM               Expand All Collapse All            Date of Service: 09/28/19     CC: Left groin pain and immobility        Assessment:   1. Closed fracture of left superior pubic ramus, initial encounter ([facility])      2. Hematoma of groin, initial encounter         Appears he has a subacute pelvic fracture.  He does have nearby hematomas that could have been from a strain of his abductor muscles.     We discussed the diagnosis and findings with the patient at length.  We reviewed possible non operative and operative interventions and the risks and benefits of these.  he had a chance to ask questions and all of these were answered to his satisfaction.           Plan:  Nonoperative treatment of pubis fracture  Weightbearing as tolerated left lower  extremity  PT/OT  If drainage of the hematoma is needed to be accomplished this would need to be done through interventional radiology.  He can follow-up in 3 to 4 weeks to check on progress with mobility.     Tom Olguin M.D.   Physician   Radiology   OR Surgeon   Signed   Date of Service:  10/2/2019  1:13 PM          You Olivares M.D.   Physician   Surgery Orthopedic   OP Report   Signed   Date of Service:  10/9/2019 12:00 AM                          DATE OF SERVICE:  10/09/2019     PREOPERATIVE DIAGNOSIS:  Left pubic osteomyelitis.     POSTOPERATIVE DIAGNOSIS:  Left pubic osteomyelitis.     PROCEDURE PERFORMED:  Incision and drainage of pelvic osteomyelitis and   abscess, left pubis.        Jessica Goldman M.D.   Physician   Infectious Disease   Consults   Signed   Date of Service:  10/5/2019  8:14 AM               Expand All Collapse All          INFECTIOUS DISEASES INPATIENT CONSULT NOTE      Date of Service: 10/5/2019     Consult Requested By: Rhina Varma M.D.     Reason for Consultation: Left thigh abscess           IMPRESSION:   1.  Left thigh abscess                2.  MSSA infection  3.  Intertrigo  4.  Leukocytosis  5.  Rheumatoid arthritis  6.  Chronic immunosuppression        PLAN:   Richard Hubbard II is a 44 y.o. man with a history of rheumatoid arthritis on Humira, chronic right knee pain with plans for total right knee arthroplasty, and recent fall sustained 1 month prior to admission admitted for worsening left thigh/groin pain.  Patient found to have multiple fluid collections on imaging which were concerning for an abscess.  He underwent CT-guided drainage on 10/2 with cultures growing MSSA.  Patient also has evidence of intertrigo for which he is on fluconazole.  Blood cultures have been negative.  Agree with continuing Unasyn for now as it also has some gram-negative coverage since Gram stain showed GNR's.  He does have persistent leukocytosis which is stable.  If his  white count continues to worsen and increase tomorrow, recommend repeat imaging to assess if there is any interval development of any new abscesses.  Further recommendations per clinical course.        Plan of care discussed with IM Rhina Varma M.D.. Will continue to follow     Jessica Goldman M.D.        Please note that this dictation was created using voice recognition software. I have worked with technical experts from [facility] to optimize the interface.  I have made every reasonable attempt to correct obvious errors, but there may be errors of grammar and possibly content that I did not discover before finalizing the note.     Alexx Khan D.O.   Physician   Physical Medicine & Rehab   Consults   Signed   Date of Service:  10/15/2019  5:25 PM                    []Hide copied text    []Jose Mver for details                                                  Physical Medicine and Rehabilitation Consultation                                                                                      Initial Consult        Date of Consultation: 10/15/2019  Consulting provider: Alexx Khan D.O.  Reason for consultation: pain control, assess for acute inpatient rehab appropriateness  Consulting physician: Dr Pena     Chief complaint: pain in the pelvis and right knee     HPI: Patient is a 44 y.o. male  with a past medical history of aortic root dilation and moderate aortic stenosis, PUD, rheumatoid arthritis on Humira and chronically immunosuppressed, admitted to [facility] on 9/13, with anemia and pelvic pain. He was scheduled for a total knee replacement on the right prior to this admission. He had CT here which showed left pubic ramus fracture and two large hematomas. 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 MRI spine with mild chronic compression fracture of T11 and paraspinal soft tissue contusions on the lumbar spine. He is WBAT for his  "pelvic fracture, managed non-operatively.  Patient found to have multiple fluid collections on imaging which were concerning for an abscess, and underwent CT-guided drainage on 10/2 with cultures growing MSSA.  Patient underwent surgical debridement on 10/9, pathology consistent with osteomyelitis, on IV antibiotics with stop date of 11/19/2019     He also reports systemic fungal infection over his chest as well as his groin.     He reports pain in the pelvis as aching progressing to sharp with increased sensitivity, 5-6/10, constant, worse with motion, no radiation, no lower extremity weakness, no other associated symptoms.  He was started on MS Contin and increase to 30 mg 3 times a day, with improvement in pain.  He reports pain previously was at a 9/10.  Pain is also helped by oxycodone as needed     He also reports pain from the rheumatoid arthritis in bilateral feet as well as right knee.  He describes this pain as dull and achy, increased with weightbearing.     ROS:  Gen: No fatigue, confusion, significant weight loss  Eyes: no blurry vision, double vision or pain in eyes  ENT: no changes in hearing, runny nose, nose bleeds, sinus pain  Endo: no episodes of low blood sugar  CV: No CP, palpitations,   Lungs: no shortness of breath, changes in secretions, changes in cough, pain with coughing  Abd: No abd pain, nausea, vomiting, pain with eating  : no blood in urine, suprapubic pain  Ext/MSK: No swelling in legs, asymmetric atrophy  Neuro: no changes in strength or sensation  Skin: no new ulcers/skin breakdown appreciated by patient  Mood: No changes in mood today, increase in depression or anxiety  Heme: no bruising, or bleeding  Allergy: No recent allergies  Rest of 14 point review of systems is negative              Physical Exam:  Vitals: /78   Pulse (!) 102   Temp 36.9 °C (98.4 °F) (Oral)   Resp 18   Ht 1.803 m (5' 11\")   Wt 99.6 kg (219 lb 9.3 oz)   SpO2 99%   Gen: NAD  HEENT:  NC/AT, " PERRLA, moist mucous membranes  Cardio: RRR, no mumurs  Pulm: CTAB, with normal respiratory effort  Abd: Soft NTND, active bowel sounds,   Ext: No peripheral edema. No calf tenderness. No clubbing/cyanosis. +dorsal pedalis pulses bilaterally.  Tender to palpation over the left groin, anticipatory pain     Skin: Multiple areas of drying satellite lesions over the chest and bilateral groin, fungal infection     Mental status:  A&Ox4 (person, place, date, situation) answers questions appropriately follows commands  Speech: fluent, no aphasia or dysarthria     CRANIAL NERVES:  2,3: visual acuity grossly intact, PERRL  3,4,6: EOMI bilaterally, no nystagmus or diplopia  5: sensation intact to light touch bilaterally and symmetric  7: no facial asymmetry  8: hearing grossly intact  9,10: symmetric palate elevation  11: SCM/Trapezius strength 5/5 bilaterally  12: tongue protrudes midline     Motor:  Upper extremities 5 out of 5  Lower extremities is limited secondary to pain     Sensory:   intact to light touch through out     DTRs: 2+ in bilateral biceps, triceps, brachioradialis, 2+ in bilateral patellar and achilles tendons  No clonus at bilateral ankles  Negative babinski b/l  Negative Vaz b/l         ASSESSMENT:     Pelvic osteomyelitis: With pelvic fracture, managed nonoperatively, MSSA infection, patient underwent surgical debridement on 10/9  -Weightbearing as tolerated  - Ancef 2 g every 8 hours  -Would recommend discussing with infectious disease on transitioning to IV antibiotics which can be administered at home, daily/every 12 hour dosing  -Stop date for antibiotics of 11/19     Rheumatoid arthritis: Mainly affecting right knee bilateral feet and left wrist  -Unable to continue with Humira given above osteomyelitis  -Unable to start steroid as per infectious disease     Fungal infection:  - Miconazole 2% cream twice daily     Pain: Combination of osteomyelitis and pelvis right knee and rheumatoid arthritis  pain present and bilateral feet and hands.  -Patient is on too high of a dose of opioids for discharge home, greater than 90 morphine Eq/day  -Currently on MS Contin 30 mg 3 times a day, oxycodone 10 mg every 4 hours as needed pain, only required 2 tablets today  -Recommend decreasing dose of MS Contin to 15 mg 3 times a day,   -Recommend starting Tylenol 1000 mg 3 times daily scheduled, LFTs within normal limits on 10/12   -Discontinue IV morphine  -Procedures limited by presence of osteomyelitis     -Consider increasing dose of Lyrica to 50 mg 3 times a day     History of upper GI bleed: Limits ability to utilize anti-inflammatory medication, pending full work up by GI, HBG 8.8  - Omeprazole 40 mg twice daily     Rehabilitation: Impaired ADLs and mobility  -I do think this patient would benefit from acute rehabilitation, this would be the quickest way to transition this patient from the acute hospitalization to home.  - Barriers to acute rehab include alteration of IV antibiotics, vs recommendation to be done at time of discharge from acute rehabilitation, and ability to tolerate therapy with lower dose of opioids as above.  -We will closely monitor patient over next 24 hours     Discussed with pt, summarized hospitalization and care, options for next step of care     Discussed with team about recommendations        Discussed with patient about recommendations for and plan for rehabilitation as follows. Patient with multiple co-morbidities(including but not limited to pelvic osteomyelitis, abscess of left thigh, rheumatoid arthritis, systemic fungal infection, anemia secondary to blood loss, peptic ulcer disease, hypertension, lower extremity edema, chronic right knee pain); with functional deficits in mobility/self-care, and Severe de-conditioning.      Pre-morbidly, this patient lived in a single level home with One steps to enter, with spouse. The patient was evaluated by acute care Physical Therapy and  Occupational Therapy; currently requiring moderate assistance for mobility and minimal assistance for ADLs deficits.      The patient is a(n) Very Good candidate for an acute inpatient rehabilitation program with a coordinated program of care at an intensity and frequency not available at a lower level of care.      Note: if patient continues to progress while waiting for medical clearance, and no longer requires 2 of of 3 therapy services (PT/OT/SLP) at a CGA/Minimal assistance level, patient will no longer need acute inpatient rehabilitation.     This recommendation is substantiated by the patient's current medical condition with intervention and assessment of medical issues requiring an acute level of care for patient's safety and maximum outcome. A coordinated program of care will be provided by an interdisciplinary team including physical therapy, occupational therapy, hospitalist, physiatry, rehab nursing and rehab psychology. Rehab goals include improved , mobility, self-care management, strength and conditioning/endurance, pain management, bowel and bladder management, mood and affect, and safety with independent home management including caregiver training.      Estimated length of stay is approximately 10-14 days. Rehab potential: Very Good. Disposition: to pre-morbid independent living setting with supportive care of patient's spouse. We will continue to follow with you in anticipation of discharge to acute inpatient rehabilitation when medically stable to do so at the discretion of his  attending physician. Thank you for allowing us to participate in his care. Please call with any questions regarding this recommendation.     Patient was seen for 80 minutes on unit/floor of which > 50% of time was spent on counseling and coordination of care regarding the above, including prognosis, risk reduction, benefits of treatment, and options for next stage of care.        Alexx Khan D.O.  Physical Medicine  "and Rehabilitation           Co-morbidities: as listed above and below   Potential Risk - Complications: Contractures, Deep Vein Thrombosis, Malnutrition, Pain, Perceptual Impairment and Pressure Ulcer  Level of Risk: High    Ongoing Medical Management Needed (Medical/Nursing Needs):   Patient Active Problem List    Diagnosis Date Noted   • Pubic ramus fracture ([facility]) 09/28/2019     Priority: Low   • Essential hypertension 09/13/2019     Priority: Low   • Rheumatoid arthritis ([facility]) 09/08/2015     Priority: Low   • Chronic pain of right knee 09/08/2015     Priority: Low   • Nausea and vomiting 10/16/2019   • Osteomyelitis ([facility]) 10/07/2019   • Candida rash of groin 10/03/2019   • Abscess of left thigh 10/02/2019   • Sepsis ([facility]) 10/02/2019   • Bilateral leg edema 10/01/2019   • Hematoma of left thigh 10/01/2019   • Positive occult stool blood test 09/17/2019   • Aortic root dilatation ([facility]) 09/13/2019   • Elevated alkaline phosphatase level 09/13/2019   • Aortic stenosis, moderate 09/12/2019   • Iron deficiency anemia, unspecified iron deficiency anemia type 08/15/2019   • Localized edema 08/15/2019   • Aortic ejection murmur 03/08/2019   • Obesity (BMI 35.0-39.9 without comorbidity) ([facility]) 08/09/2018   • Pagophagia 11/13/2017   • Adult ADHD 09/08/2010   • MONICA (obstructive sleep apnea) 03/09/2010   • Bleeding ulcer 05/21/2009   • Depression 05/21/2009   • Eczema 05/21/2009     Ghazal Henry R.N.   Registered Nurse      Progress Notes   Signed   Date of Service:  10/17/2019  8:59 AM                        Milk of magnesia given over miralax/out of order pt pt request           Current Vital Signs:   Temperature: 36.6 °C (97.9 °F) Pulse: 75 Respiration: 16 Blood Pressure: 117/86  Weight: 99.6 kg (219 lb 9.3 oz) Height: 180.3 cm (5' 11\")  Pulse Oximetry: 98 % O2 (LPM): 0      Completed Laboratory Reports:  Recent Labs     10/15/19  0927 10/15/19  0958   WBC  --  5.3   HEMOGLOBIN  --  8.8*   HEMATOCRIT  --  29.0* "   PLATELETCT  --  339   SODIUM 139  --    POTASSIUM 4.4  --    BUN 9  --    CREATININE 0.55  --    GLUCOSE 89  --        Results for LESLIE BADILLO II (MRN [id number]) as of 10/17/2019 09:04   Ref. Range 10/2/2019 15:40 10/2/2019 15:41 10/9/2019 14:27 10/9/2019 14:27 10/9/2019 14:27   Significant Indicator Unknown NEG NEG . POS (POS) NEG   Site Unknown Peripheral Peripheral Left Pubis Left Pubis Left Pubis   Source Unknown BLD BLD TISS TISS TISS       Additional Labs: Not Applicable    Prior Living Situation:   Housing / Facility: 1 Story House  Steps Into Home: 2  Steps In Home: 0  Lives with - Patient's Self Care Capacity: Spouse  Equipment Owned: Front-Wheel Walker, Hand Held Shower    Prior Level of Function / Living Situation:   Physical Therapy: Prior Services: None  Housing / Facility: 1 Story House  Steps Into Home: 2  Steps In Home: 0  Rail: None  Bathroom Set up: Walk In Shower(has access to tub shower combo with HHS if needed)  Equipment Owned: Front-Wheel Walker, Hand Held Shower  Lives with - Patient's Self Care Capacity: Spouse  Bed Mobility: Independent  Transfer Status: Independent  Ambulation: Independent  Distance Ambulation (Feet): (limited community)  Assistive Devices Used: Front-Wheel Walker  Current Level of Function:   Level Of Assist: Refused(just returned from taking a shower and is too fatigued)  Assistive Device: Front Wheel Walker  Distance (Feet): 20  Deviation: Antalgic, Step To, Shuffled Gait  # of Stairs Climbed: 0  Weight Bearing Status: WBAT B LE  Skilled Intervention: Verbal Cuing, Postural Facilitation, Facilitation  Comments: shuffling steps, limitedb y pain R knee> L hip/groin  Supine to Sit: Minimal Assist  Sit to Supine: Moderate Assist  Scooting: Supervised  Skilled Intervention: Verbal Cuing  Comments: HOB up to approx 60 degrees prior to supine to sit  Sit to Stand: Minimal Assist(close guarding for safety)  Bed, Chair, Wheelchair Transfer: Refused  Transfer Method:  Stand Pivot  Skilled Intervention: Verbal Cuing, Postural Facilitation  Comments: Fatigued today, overall appears to have declined endurance  Sitting in Chair: up in chair at end of session  Sitting Edge of Bed: 8 min  Standing: mod SOB with activity, fatgiued due to effort to ambulate related to pain  Occupational Therapy:   Self Feeding: Independent  Grooming / Hygiene: Independent  Bathing: Independent  Dressing: Independent  Toileting: Independent  Medication Management: Independent  Laundry: Requires Assist  Kitchen Mobility: Requires Assist  Finances: Requires Assist  Home Management: Requires Assist  Shopping: Requires Assist  Prior Level Of Mobility: Independent With Device in Home(minimal- to BR only, used borrowed w/c to exit home)  Driving / Transportation: Relatives / Others Provide Transportation  Prior Services: None  Housing / Facility: [address]  Occupation (Pre-Hospital Vocational): Other (Comments)(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)  Leisure Interests: Unable To Determine At This Time  Current Level of Function:   Eating: Not Tested  Bathing: Not Tested  Upper Body Dressing: Minimal Assist  Lower Body Dressing: Maximal Assist  Toileting: Not Tested  Skilled Intervention: Verbal Cuing, Compensatory Strategies  Comments: pt leaning on BUE at sink during grooming for support  Speech Language Pathology:      Rehabilitation Prognosis/Potential: Good  Estimated Length of Stay: 14- days    Nursing:   Orientation : Oriented x 4  Continent    Scope/Intensity of Services Recommended:  Physical Therapy: 1.5 hr / day  5 days / week. Therapeutic Interventions Required: Maximize Endurance, Mobility, Strength and Safety  Occupational Therapy: 1.5 hr / day 5 days / week. Therapeutic Interventions Required: Maximize Self Care, ADLs, IADLs and Energy Conservation  Rehabilitation Nursin/7. Therapeutic Interventions Required: Monitor Pain, Skin, Wound(s), Vital Signs, Intake and Output,  Labs, Safety, Family Training and antibiotic management   Rehabilitation Physician: 3 - 5 days / week. Therapeutic Interventions Required: Medical Management  Respiratory Care: evaluate . Therapeutic Interventions Required: Pulmonary Toileting  Dietician: consult . Therapeutic Interventions Required: nutritional need     Rehabilitation Goals and Plan (Expected frequency & duration of treatment in the IRF):   Return to the Community and Modified Independent Level of Care  Anticipated Date of Rehabilitation Admission: 10/17/2019  Patient/Family oriented IRF level of care/facility/plan: Yes  Patient/Family willing to participate in IRF care/facility/plan: Yes  Patient able to tolerate IRF level of care proposed: Yes  Patient has potential to benefit IRF level of care proposed: Yes  Comments: Not Applicable    Special Needs or Precautions - Medical Necessity:  Safety Concerns/Precautions:  Fall Risk / High Risk for Falls and Balance  Complex Wound Care: post surgical   Pain Management  IV Site: PICC  Current Medications:    Current Facility-Administered Medications Ordered in Epic   Medication Dose Route Frequency Provider Last Rate Last Dose   • predniSONE (DELTASONE) tablet 40 mg  40 mg Oral DAILY Rhina Varma M.D.   40 mg at 10/17/19 0513   • morphine ER (MS CONTIN) tablet 15 mg  15 mg Oral TID Rhina Varma M.D.   15 mg at 10/17/19 0513   • ondansetron (ZOFRAN ODT) dispertab 4 mg  4 mg Oral TID AC Rhina Varma M.D.   4 mg at 10/17/19 0845   • acetaminophen (TYLENOL) tablet 1,000 mg  1,000 mg Oral TID Rhina Varma M.D.   1,000 mg at 10/17/19 0514   • ondansetron (ZOFRAN) syringe/vial injection 4 mg  4 mg Intravenous Q6HRS PRN Rachel Mcclendon M.D.   4 mg at 10/16/19 2210   • ceFAZolin (ANCEF) 2 g in  mL IVPB  2 g Intravenous Q8HRS Romie Galdamez M.D.   Stopped at 10/17/19 0545   • simethicone (MYLICON) chewable tab 80 mg  80 mg Oral TID PRN Israel Pena M.D.       • oxyCODONE immediate release  (ROXICODONE) tablet 10 mg  10 mg Oral Q4HRS PRN Rhina Varma M.D.   10 mg at 10/17/19 0646   • enoxaparin (LOVENOX) inj 40 mg  40 mg Subcutaneous DAILY Rhina Varma M.D.   40 mg at 10/17/19 0515   • miconazole (MICOTIN) 2 % cream   Topical BID Romie Galdamez M.D.       • vitamin D (cholecalciferol) tablet 1,000 Units  1,000 Units Oral DAILY Rhina Varma M.D.   1,000 Units at 10/17/19 0513   • calcium citrate (CALCITRATE) tablet 950 mg  950 mg Oral DAILY Rhina Varma M.D.   950 mg at 10/17/19 0513   • ferrous sulfate tablet 325 mg  325 mg Oral DAILY Jessica Mccray M.D.   325 mg at 10/17/19 0514   • furosemide (LASIX) tablet 20 mg  20 mg Oral BID DIURETIC Jessica Mccray M.D.   20 mg at 10/13/19 1732   • losartan (COZAAR) tablet 25 mg  25 mg Oral DAILY Jessica Mccray M.D.   25 mg at 10/17/19 0514   • omeprazole (PRILOSEC) capsule 40 mg  40 mg Oral BID Jessica Mccray M.D.   40 mg at 10/17/19 0513   • pregabalin (LYRICA) capsule 25 mg  25 mg Oral TID Jessica Mccray M.D.   25 mg at 10/17/19 0513   • senna-docusate (PERICOLACE or SENOKOT S) 8.6-50 MG per tablet 2 Tab  2 Tab Oral BID Jessica Mccray M.D.   2 Tab at 10/17/19 0513    And   • polyethylene glycol/lytes (MIRALAX) PACKET 1 Packet  1 Packet Oral QDAY PRN Jessica Mccray M.D.   1 Packet at 10/06/19 2252    And   • magnesium hydroxide (MILK OF MAGNESIA) suspension 30 mL  30 mL Oral QDAY PRN Jessica Mccray M.D.   30 mL at 10/17/19 0858    And   • bisacodyl (DULCOLAX) suppository 10 mg  10 mg Rectal QDAY PRN Jessica Mccray M.D.   10 mg at 10/12/19 1529   • nicotine (NICODERM) 14 MG/24HR 14 mg  14 mg Transdermal Daily-0600 Israel Pena M.D.   14 mg at 10/17/19 0514    And   • nicotine polacrilex (NICORETTE) 2 MG piece 2 mg  2 mg Oral Q HOUR PRN Israel Pena M.D.         No current [facility]-ordered outpatient medications on file.     Diet:   DIET ORDERS (From admission  to next 24h)     Start     Ordered    10/09/19 1755  Diet Order Regular  ALL MEALS     Question:  Diet:  Answer:  Regular    10/09/19 1754                Anticipated Discharge Destination / Patient/Family Goal:  Destination: Home with Assistance Support System: Spouse and Family   Anticipated home health services: OT, PT, Nursing and Aide  Previously used HH service/ provider: Not Applicable  Anticipated DME Needs: Walker  Outpatient Services: PT  Alternative resources to address additional identified needs: Initial appointment with Dr. Gerardo Gutierrez to establish pain management follow up     Pre-Screen Completed: 10/17/2019 8:52 AM Malachi Pantoja

## 2019-10-17 NOTE — PROGRESS NOTES
Patient notified at 0915 of transfer time at 1100 to rehab today. Very concerned that this will not be enough time to eat and shower. RN reassured patient that CNA and RN can assist patient in getting things done on time. SW attempted to reschedule transfer time but unable to. Pt made aware, continues to be hesitant despite encouragement.

## 2019-10-17 NOTE — DISCHARGE PLANNING
LSW informed pt that transport is set for 11:00 today. Pt upset that he does not have enough time to shower and eat. Pt requested later transport.     LSW spoke with Malachi who stated that is their only transport time today.    LSW informed pt no other time is available. Pt upset but agreeable to going.

## 2019-10-17 NOTE — CARE PLAN
Problem: Infection  Goal: Will remain free from infection  Outcome: PROGRESSING AS EXPECTED  Intervention: Implement standard precautions and perform hand washing before and after patient contact  Note:   Patient does not exhibit signs or symptoms of infection. Hand hygiene performed before and after patient contact.     Problem: Pain Management  Goal: Pain level will decrease to patient's comfort goal  Outcome: PROGRESSING AS EXPECTED  Intervention: Follow pain managment plan developed in collaboration with patient and Interdisciplinary Team  Note:   Administered analgesics per MAR.

## 2019-10-17 NOTE — DISCHARGE PLANNING
MARISELAW received call that acute rehab will be picking up pt at 11:00am today. MARISELAW updated MD and RN.

## 2019-10-17 NOTE — PROGRESS NOTES
Pt alert and oriented. C/o 5/10 L groin and R knee pain, denies intervention at this time. Medicated per mar for nausea before breakfast. Single lumen PICC to [facility] [place], saline locked. Pale skin with flaky feet, rash to chest and groin. Dressing to lower abdomen CDI, small firm area underneath. SCDs in place. Hourly rounding. Call light within reach.

## 2019-10-17 NOTE — PROGRESS NOTES
Infectious Disease Progress Note    Author: Jessica Goldman M.D. Date & Time of service: 10/17/2019  9:28 AM    Chief Complaint:  Follow-up for left thigh abscess/pelvic OM     Interval History:  44 y.o. man with a history of rheumatoid arthritis on Humira, chronic right knee pain with plans for total right knee arthroplasty, and recent fall sustained 1 month prior to admission admitted for worsening left thigh/groin pain.  Patient found to have multiple fluid collections on imaging which were concerning for an abscess.  He underwent CT-guided drainage on 10/2 with cultures growing MSSA.     10/6 afebrile WBC 12.7 ongoing left groin pain however the crampy pain is somewhat decreased and he is able to ambulate better.  Plan for MRI of the pelvis and femur today  10/7 afebrile WBC 11.4 patient is very anxious after hearing about results of MRI.  MRI of the pelvis reveals osteomyelitis.  Awaiting surgical evaluation for possible surgical debridement.  Patient states left thigh pain remains unchanged and requiring multiple pain medications.  Pain worse with standing  10/8 afebrile WBC 8 patient is agreeable to surgery.  Repeat CT scan shows interval decrease and left thigh abscess.  Minimal output in drain.  Waiting for PICC line  10/9 AF WBC 6.6 plan for surgery today, also has flare up of rash on chest associated with itching  10/10 afebrile WBC 6.4 patient is complaining of significant postop pain.  He underwent debridement yesterday.  He is also complaining of constipation and has not had any bowel movement since Monday.  Wife at the bedside states that surgeon mentioned bone looked infected  10/11 T max 99.1. White count 6.7. Tolerating antibiotics. Pain at surgical site   10/12 afebrile, white count 6.2.  Tolerating antibiotics.  Pathology consistent with osteomyelitis.  OR cultures no growth till date.  Trying to wean off IV pain meds so he can go to rehab.  10/13 afebrile, white count 5.1.  Notes that there  is leakage around his IR drain site, feels that it may have been pulled out some, and the dressing is wet.  Continues to have pain.   10/14   10/15 afebrile.  Patient feels poorly but overall improved since admission.  He also notes a tender area along his incision  10/16 AF no CBC done today. Pt concerned about IV abx being a barrier to transfer to rehab and is reluctant to transition to PO abx  10/17 AF plan to transfer to rehab today. No new complaints other than wanting to shower before transfer  Review of Systems:  Review of Systems   Constitutional: Negative for chills, fever and malaise/fatigue.   Gastrointestinal: Positive for abdominal pain and constipation. Negative for diarrhea, nausea and vomiting.        Improved   Musculoskeletal: Positive for joint pain and myalgias.   Skin: Positive for rash.   Neurological: Negative for dizziness and headaches.   All other systems reviewed and are negative.      Hemodynamics:  Temp (24hrs), Av.7 °C (98 °F), Min:36.6 °C (97.8 °F), Max:36.8 °C (98.2 °F)  Temperature: 36.6 °C (97.9 °F)  Pulse  Av.2  Min: 50  Max: 109   Blood Pressure: 117/86       Physical Exam:  Physical Exam   Constitutional: He is oriented to person, place, and time. He appears well-developed and well-nourished.   HENT:   Head: Normocephalic and atraumatic.   Eyes: Pupils are equal, round, and reactive to light. Conjunctivae and EOM are normal.   Neck: Neck supple.   Cardiovascular: Normal rate, regular rhythm and normal heart sounds.   Pulmonary/Chest: Effort normal and breath sounds normal.   Abdominal: Soft. Bowel sounds are normal. He exhibits no distension. There is no tenderness. There is no rebound and no guarding.   Prior drain site without any drainage.    Lower pelvic surgical site with area of induration. +TTP   Musculoskeletal: He exhibits edema.   Right knee.      Left upper extremity PICC line-nontender, no surrounding erythema   Neurological: He is alert and oriented to  person, place, and time.   Skin: Rash noted.   Mild erythematous rash in groin, patchy, scaling macular rash on abdomen chest, improving   Psychiatric: He has a normal mood and affect. His behavior is normal.       Meds:    Current Facility-Administered Medications:   •  predniSONE  •  morphine ER  •  ondansetron  •  acetaminophen  •  ondansetron  •  ceFAZolin  •  simethicone  •  oxyCODONE immediate-release  •  enoxaparin  •  miconazole  •  vitamin D  •  calcium citrate  •  ferrous sulfate  •  furosemide  •  losartan  •  omeprazole  •  pregabalin  •  senna-docusate **AND** polyethylene glycol/lytes **AND** magnesium hydroxide **AND** bisacodyl  •  nicotine **AND** Nicotine Replacement Patient Education Materials **AND** nicotine polacrilex    Labs:  Recent Labs     10/15/19  0958   WBC 5.3   RBC 3.31*   HEMOGLOBIN 8.8*   HEMATOCRIT 29.0*   MCV 87.6   MCH 26.6*   RDW 62.2*   PLATELETCT 339   MPV 8.6*   NEUTSPOLYS 57.50   LYMPHOCYTES 29.10   MONOCYTES 9.30   EOSINOPHILS 1.90   BASOPHILS 1.30     Recent Labs     10/15/19  0927   SODIUM 139   POTASSIUM 4.4   CHLORIDE 102   CO2 26   GLUCOSE 89   BUN 9     Recent Labs     10/15/19  0927   CREATININE 0.55       Imaging:  Ct-abdomen-pelvis With    Result Date: 10/7/2019  10/7/2019 4:57 PM HISTORY/REASON FOR EXAM:  Left groin pain. History of CT drain placement on 10/2/2018. TECHNIQUE/EXAM DESCRIPTION:   CT scan of the abdomen and pelvis with contrast. Contrast-enhanced helical scanning was obtained from the diaphragmatic domes through the pubic symphysis following the bolus administration of nonionic contrast without complication. 100 mL of Omnipaque 350 nonionic contrast was administered without complication. Low dose optimization technique was utilized for this CT exam including automated exposure control and adjustment of the mA and/or kV according to patient size. COMPARISON: 9/28/2019 FINDINGS: The visualized lung bases are unremarkable. CT Abdomen: The liver, spleen,  adrenal glands, pancreas, and kidneys are unremarkable. The gallbladder has been removed. There are postoperative changes in the bowel scattered air-fluid levels. A small ventral hernia superior to the umbilicus contains the anterior wall of the colon without secondary obstruction. No transition point to suggest obstruction appreciated. There  is a moderate amount of stool in the rectosigmoid colon. CT Pelvis: The bladder is unremarkable. A drain has been placed in the fluid collection within the left abductor muscles. The collection has decreased in size from the prior exam and is difficult to measure. Fluid collection in the anterior aspect of the adductor longus muscle measures approximately 5.3 x 2.8 x 2 cm, previously approximately 6.5 x 3.5 x 1.7 cm. Fluid collection within the left pubococcygeus muscle measures approximately 2.5 x 1.6 x 2.1 cm, previously 2.4 x 2.3 x 1.1 cm. There is edema in the anterior subcutaneous tissues. Fracture of the left pubic bone is again noted.     1.  Interval decrease in the intramuscular abscesses involving the left pelvis and thigh following drain placement. Collection in the pubococcygeus muscle is similar in size to the prior exam. 2.  Ventral hernia containing anterior bowel wall and fat. No secondary obstruction.    Ct-drain-hematoma - Seroma    Result Date: 10/2/2019  10/2/2019 12:14 PM HISTORY/REASON FOR EXAM:  Left inguinal fluid collection. Moderate sedation was administered with continuous monitoring of the patient under the direct supervision of  Total sedation time: 30 minutes The biopsy/drainage was done utilizing low dose optimization CT techniques including auto modulation for  imaging and low mA CT/fluoroscopy mode for the procedure. PROCEDURE: After the informed consent the patient was placed on the CT table on supine position. The skin over the left groin was prepped. Localization CT scan demonstrates fluid collection in the left obturator  externus and pectineus muscles. Subsequently after injecting lidocaine needle was advanced into the larger fluid collection. Pus was aspirated. Subsequently a wire was introduced. After dilating the tract an 8 Wallisian drain was introduced and placed with its loop in the fluid collection. An approximately 100 mL of fluid was aspirated. Subsequently under the needle was advanced into the superficial/pectineus muscle fluid collection. An approximately 10 mL of fluid was aspirated. No drain was placed in the superficial fluid collection. A sample of the material was sent to the microbe allergy. The patient tolerated the procedure without any immediate complication.     1.  Left inguinal region muscular abscess. 2.  CT-guided drainage and catheter placement of large collection in the left obturator externus. 3.  CT-guided aspiration of smaller collection in the left pectineus muscle.    Ct-lspine W/o Plus Recons    Result Date: 9/28/2019 9/28/2019 4:58 AM HISTORY/REASON FOR EXAM: Back pain after recent fall TECHNIQUE/EXAM DESCRIPTION: CT lumbar spine without contrast, with reconstructions. Noncontrast helical scanning was obtained from T12 through the sacrum.  Sagittal reconstructions were generated from the axial images. Low dose optimization technique was utilized for this CT exam including automated exposure control and adjustment of the mA and/or kV according to patient size. COMPARISON:  None. FINDINGS: The bones are markedly osteopenic. There is a minimal compression deformity of the superior endplate of the L2 body, which is old. There is no evidence of acute fracture. There is multilevel interspace and facet arthropathy.     No acute fracture or traumatic malalignment.    Ct-pelvis With    Result Date: 9/28/2019 9/28/2019 3:21 AM HISTORY/REASON FOR EXAM:  significant left groin pain, possible hematoma on us. TECHNIQUE/EXAM DESCRIPTION AND NUMBER OF VIEWS: CT scan of the pelvis with contrast. Contrast-enhanced  helical scanning of the pelvis was obtained from the iliac crests through the pubic symphysis following the bolus administration of 100 mL of Omnipaque 350 nonionic contrast without complication. Low dose optimization technique was utilized for this CT exam including automated exposure control and adjustment of the mA and/or kV according to patient size. COMPARISON:  None. FINDINGS: In the deep left inguinal region, there is a masslike structure containing irregular fluid-filled cavity, which measures overall about 8.6 cm in longest dimension. Within the cavity or focal areas of increased attenuation consistent with calcification. This lesion is immediately adjacent to a recent no probably subacute fracture of the left superior pubic ramus near the symphysis. Lateral to this lesion is a smaller complex mass measuring about 7.3 cm maximum dimension. There is no abnormality within the visualized pelvic cavity and there is no free intraperitoneal fluid. The bladder is intact.     1.  Recent but probably subacute fracture of the left superior pubic ramus near the symphysis. Adjacent and just lateral to this are two complex masslike structures, the largest and most medial of which contains a fluid-filled cavity. These are consistent with hematomata, possibly intramuscular.    Dx-lumbar Spine-2 Or 3 Views    Result Date: 9/16/2019 9/16/2019 4:15 PM HISTORY/REASON FOR EXAM:  Atraumatic Pain TECHNIQUE/ EXAM DESCRIPTION AND NUMBER OF VIEWS:  3 views of the lumbar spine. COMPARISON: None. FINDINGS: Dextroconvex lumbar curvature. Evaluation of the sacrum is limited due to overlying bowel content. No acute fracture. No malalignment. No compression deformity. Mild to moderate degenerative change of the lumbar spine.     Dextroconvex lumbar curvature. No acute osseous abnormality. No malalignment.    Dx-thoracic Spine-with Swimmers View    Result Date: 9/16/2019 9/16/2019 4:15 PM HISTORY/REASON FOR EXAM:  Atraumatic Pain;  ?scoliotic MId back pain and low back pain after GLF x 2 weeks TECHNIQUE/EXAM DESCRIPTION AND NUMBER OF VIEWS:  Thoracic spine, 3 views. COMPARISON:  None. FINDINGS: Diffuse osseous demineralization, limiting evaluation for nondisplaced fracture. No acute fracture. No malalignment. Mild chronic height loss of the mid thoracic spine from T9 to T11. Mild degenerative change of the thoracic spine.     Mild chronic height loss of the mid thoracic spine from T9 to T11.    Mr-femur-w/o Left    Result Date: 10/6/2019  10/6/2019 11:08 AM HISTORY/REASON FOR EXAM:  Left upper leg edema and redness TECHNIQUE/EXAM DESCRIPTION: MRI of the LEFT femur and pelvis without. The study was performed on a [employer] Sign[employer] 1.5 Mae MRI scanner. T1 sagittal, T1 coronal, T1 axial, T2 fat-suppressed axial, T1 postcontrast fat-suppressed axial, and fast spin-echo inversion recovery sagittal images were obtained of the femur. Additional axial and coronal T1 and T2 fat-sat or STIR images obtained of the pelvis. COMPARISON: CT pelvis 9/28/2018 FINDINGS: Exam is limited secondary to significant patient motion. There is edema in the sacrum bilaterally, likely related to subacute stress fracture. There is subtle sclerosis visualized on CT. Additionally, there is a comminuted fracture of the left pubic bone which appears subacute to chronic. There is associated increased T2 signal and confluent low T1 signal. Low T1 signal extends towards the acetabulum. The fluid within the abductor minimus muscle appears to communicate with high T2 signal extending into the left inferior pubic ramus (image 28, series 7 of the  pelvic MRI) and superior pubic ramus (image 21, series 4 of the pelvic MRI). There is osseous expansion and probable cortical destruction, best seen on the T1 coronal sequence of the pelvis. No pubic symphysis or SI joint widening is identified. No abnormal marrow signal is appreciated in the visualized femur. There is edema and enlargement of  the muscles of the medial compartment and pectineus muscle. Low signal within the abductor muscles is consistent with calcification seen on recent CT. There is fluid within the fibers of the adductor longus muscle. There  is significant edema in the subcutaneous tissues. There is an additional fluid collection within the pubococcygeus muscle on the left. No significant hip effusion is identified. Limited visualization of the visceral pelvis is grossly unremarkable.     1.  Limited exam secondary to significant patient motion. 2.  Subacute fracture involving the left pubic bone with osteomyelitis and probable intraosseous abscess. 3.  Extensive edema and enlargement of the muscles of the medial compartment, pectineus muscle, and pubococcygeus muscle is consistent with known intramuscular abscesses. Fluid collection is most extensive in the adductor longus muscle. 4.  Subacute fractures of the sacrum Comment: Results given to Dr. Varma through [facility] text at 3:56 PM    Mr-lumbar Spine-w/o    Result Date: 9/28/2019 9/28/2019 6:21 PM HISTORY/REASON FOR EXAM:  back pain. TECHNIQUE/EXAM DESCRIPTION: MRI of the lumbar spine without contrast. The study was performed on a [employer] Signa 1.5 Mae MRI scanner. T1 sagittal, T2 fast spin-echo sagittal, and T2 axial images were obtained of the lumbar spine. COMPARISON:  None. FINDINGS: There are bilateral sacral alar fractures. The lumbar spine maintains normal height and alignment. At the level of L5-S1, there is no spinal or neural foraminal stenosis. At the level of L4-5, there is no spinal or neural foraminal stenosis. At the level of L3-4, there is no spinal or neural foraminal stenosis. At the level of the L2-3, there is no spinal or neural foraminal stenosis. At the level of L1-2, there is no spinal or neural foraminal stenosis. The conus terminates at the level of L1. The visualized lower thoracic spinal cord appear normal. There is edema in the posterior paraspinal soft  tissue likely representing soft tissue contusion.     1.  Acute bilateral sacral alae fractures. 2.  There is edema in the lower posterior paraspinal soft tissue likely representing soft tissue contusion. 3.  There is no lumbar spine fractures.     Mr-thoracic Spine-w/o    Result Date: 9/28/2019 9/28/2019 6:21 PM HISTORY/REASON FOR EXAM:  back pain TECHNIQUE/EXAM DESCRIPTION: MRI of the thoracic spine without contrast. The study was performed on a [employer] Signa 1.5 Mae MRI scanner. T1 sagittal, T2 fast spin-echo sagittal, and T2 axial images were obtained of the thoracic spine. COMPARISON: None. FINDINGS: There is mild chronic compression deformity at T11. There is no fracture or dislocation. There is no pathologic marrow infiltration. There is no intradural extramedullary lesion. There is no abnormal intramedullary T2 signal intensity in the thoracic spinal cord.     1.  No acute fracture in the thoracic spine. 2.  Mild chronic compression deformity at T11.    Us-extremity Venous Lower Unilat Left    Result Date: 9/28/2019  HISTORY/REASON FOR EXAM:  Leg swelling after fall TECHNIQUE/EXAM DESCRIPTION: Left lower extremity doppler venous ultrasound was performed. Using both color and pulse-wave Doppler imaging, multiple images were obtained from the common femoral vein origins distally through the popliteal trifurcations.  9/28/2019 12:54 AM COMPARISON:  None. FINDINGS: REAL-TIME GRAY-SCALE IMAGING: Real-time gray-scale imaging reveals no evidence of focal wall thickening. COLOR AND DUPLEX DOPPLER IMAGING: Graded compression was used to demonstrate patent lumens.  There is no evidence for luminal thrombus.  There is normal response to augmentation and respiratory variation. There are two rounded complex areas in the left groin region, the largest measuring about 10.9 cm diameter and the smaller, about 8.3 cm in diameter, both of which are consistent with hematomata.     1.  No evidence of Left  lower extremity deep  venous thrombosis. 2.  Two complex areas within the left groin, consistent with hematomata. INTERPRETING LOCATION: [address], [place], [id number]    Us-ruq    Result Date: 10/1/2019  10/1/2019 5:49 PM HISTORY/REASON FOR EXAM:  Edema Abdominal pain TECHNIQUE/EXAM DESCRIPTION AND NUMBER OF VIEWS:  Real-time sonography of the liver and biliary tree. COMPARISON: None FINDINGS: The liver is normal in contour. There is no evidence of solid mass lesion. The liver measures 20.15 cm. The gallbladder is surgically absent. The common duct measures 0.44 cm. The pancreas is obscured by bowel gas. The visualized aorta is normal in caliber. Intrahepatic IVC is patent. The portal vein is patent with hepatopetal flow. The MPV measures 1.57 cm. The right kidney measures 11.06 cm. There is no hydronephrosis. There is no ascites.     Hepatomegaly. Status post cholecystectomy. Dilated portal vein. Status post cholecystectomy. No biliary ductal dilatation. Limited evaluation of the pancreas which is obscured.    Mr-pelvis W/o    Result Date: 10/6/2019  10/6/2019 11:08 AM HISTORY/REASON FOR EXAM:  Left upper leg edema and redness TECHNIQUE/EXAM DESCRIPTION: MRI of the LEFT femur and pelvis without. The study was performed on a [employer] Sign[employer] 1.5 Mae MRI scanner. T1 sagittal, T1 coronal, T1 axial, T2 fat-suppressed axial, T1 postcontrast fat-suppressed axial, and fast spin-echo inversion recovery sagittal images were obtained of the femur. Additional axial and coronal T1 and T2 fat-sat or STIR images obtained of the pelvis. COMPARISON: CT pelvis 9/28/2018 FINDINGS: Exam is limited secondary to significant patient motion. There is edema in the sacrum bilaterally, likely related to subacute stress fracture. There is subtle sclerosis visualized on CT. Additionally, there is a comminuted fracture of the left pubic bone which appears subacute to chronic. There is associated increased T2 signal and confluent low T1 signal. Low T1 signal extends  towards the acetabulum. The fluid within the abductor minimus muscle appears to communicate with high T2 signal extending into the left inferior pubic ramus (image 28, series 7 of the  pelvic MRI) and superior pubic ramus (image 21, series 4 of the pelvic MRI). There is osseous expansion and probable cortical destruction, best seen on the T1 coronal sequence of the pelvis. No pubic symphysis or SI joint widening is identified. No abnormal marrow signal is appreciated in the visualized femur. There is edema and enlargement of the muscles of the medial compartment and pectineus muscle. Low signal within the abductor muscles is consistent with calcification seen on recent CT. There is fluid within the fibers of the adductor longus muscle. There  is significant edema in the subcutaneous tissues. There is an additional fluid collection within the pubococcygeus muscle on the left. No significant hip effusion is identified. Limited visualization of the visceral pelvis is grossly unremarkable.     1.  Limited exam secondary to significant patient motion. 2.  Subacute fracture involving the left pubic bone with osteomyelitis and probable intraosseous abscess. 3.  Extensive edema and enlargement of the muscles of the medial compartment, pectineus muscle, and pubococcygeus muscle is consistent with known intramuscular abscesses. Fluid collection is most extensive in the adductor longus muscle. 4.  Subacute fractures of the sacrum Comment: Results given to Dr. Varma through [facility] text at 3:56 PM    Us-extremity Non Vascular Unilateral Right    Result Date: 10/7/2019  10/7/2019 2:30 PM HISTORY/REASON FOR EXAM:  Evaluate right knee for effusion. Intramuscular abscesses TECHNIQUE/EXAM DESCRIPTION AND NUMBER OF VIEWS: Sonographic images of the left knee. COMPARISON: MRI femur 10/6/2019 FINDINGS: There is a complex hypoechoic collection in the medial posterior left knee measuring approximately 3.6 x 3.1 x 2 cm.     Loculated  collection in the posteromedial left knee likely represents a Baker's cyst. No significant knee effusion is identified in the upper knee joint on recent MRI.    Ir-picc Line Placement W/ Guidance > Age 5    Result Date: 10/8/2019  HISTORY/REASON FOR EXAM:   PICC placement. TECHNIQUE/EXAM DESCRIPTION AND NUMBER OF VIEWS:   PICC line insertion with ultrasound guidance.  The procedure was performed using maximal sterile barrier technique including sterile gown, mask, cap, and donning of sterile gloves following appropriate hand hygiene and/or sterile scrub. Patient skin site was prepped with 2% Chlorhexidine solution. FINDINGS:  PICC line insertion with Ultrasound Guidance was performed by qualified nursing staff without the assistance of a Radiologist. PICC positioning appropriateness confirmed by 3CG technology; chest xray only needed in the instance 3CG unable to confirm placement.              Ultrasound-guided PICC placement performed by qualified nursing staff as above.       Micro:  No results found for: BLOODCULTU, BLDCULT, BCHOLD       Assessment:  Active Hospital Problems    Diagnosis   • *Pubic ramus fracture ([facility]) [S32.599A]   • Rheumatoid arthritis (HCC) [M06.9]   • Chronic pain of right knee [M25.561, G89.29]   • Osteomyelitis (HCC) [M86.9]   • Candida rash of groin [B37.89]   • Abscess of left thigh [L02.416]   • Sepsis (HCC) [A41.9]   • Bilateral leg edema [R60.0]   • Hematoma of left thigh [S70.12XA]     Plan:  Osteomyelitis of the left pubic bone (noted on MRI), on treatment  Status post recent fall found to have subacute fracture on presentation  ESR - 55  Anticipate 6 weeks of IV antibiotics with a stop date of 11/19/2019  Continue antibiotics per below  Status post I&D on 10/9/2019 by Dr. Olivares. Thickened periosteum and soft bone was encountered intra-op  Some leakage around the IR drain site per patient and dressing is damp. Recommend IR re-evaluation of the drain  OR pathology consistent with  osteomyelitis, left pubis tissue culture from 10/9 with MSSA     Abscess of left thigh, improved  No fevers  Leukocytosis resolved  Sustained recent fall 1 month prior to admission  Blood cultures-negative to date  Images reviewed. Initial CT with 2 complex abscesses measuring 8.6cm and 7.2cm respectively in the longest dimension  Status post CT-guided drainage on 10/2  Repeat CT on 10/7-interval decrease in the left pelvis and thigh intramuscular abscess - difficult to measure given complex nature  Cultures+ MSSA.  Gram stain after discussion with micro lab was corrected to GPCs alone  Changed IV Unasyn to IV Ancef 2 g every 8 hours on 10/12  Stop date 11/19/2019 as above  Weekly CBC with differential, CMP while on IV antibiotics  Further surgical management performed on 10/9 as above     Pityriasis versicolor, intertrigo.  On treatment, improved  Light brown macular rash on chest and trunk with some scaling with pressure, some coalescing into patches, non-pruritic  Has been ongoing for about 2 weeks, similar presentation a month ago  Received 10 days of fluconazole  Patient is on miconazole cream for intertrigo. Recommend application to chest as well -  started 10/11. Will require 2-4 weeks of treatment-improving with topical therapy     Rheumatoid arthritis  On Humira prior to admission  Chronically immunosuppressed  On steroids     Chronic right knee pain  Plan for future total right knee arthroplasty     Recent fall  Approximately 1 month prior to admission  Imaging on presentation revealed subacute fracture of the left pubic bone     Disposition: [facility] today    I have performed a physical exam and reviewed and updated ROS and plan today 10/17/2019.  In review of yesterday's note 10/16/2019, there are no changes except as documented above.      Plan of care discussed with  and bedside RN. Will sign off

## 2019-10-17 NOTE — CARE PLAN
Problem: Communication  Goal: The ability to communicate needs accurately and effectively will improve  Outcome: PROGRESSING AS EXPECTED  Very able to communicate needs wants etc. Conversational. Speech is clear. Pt is AAOx4. Understands and asks appropriate questions.     Problem: Safety  Goal: Will remain free from injury  Outcome: PROGRESSING AS EXPECTED  Goal: Will remain free from falls  Outcome: PROGRESSING AS EXPECTED  No falls no injury. Low risk. Walker at bedside therapies to see tomorrow. Call light at bedside pt uses it and waits for assistance. Oriented to call light, routine. Continue same.     Problem: Infection  Goal: Will remain free from infection  Outcome: PROGRESSING AS EXPECTED  Pt on Ancef IV long term for osteo and wound abcess.  MEWS is 0. Assess for infections q shift and prn encourage food and fluid and oob activities as pt tolerates and therapy schedules     Problem: Bowel/Gastric:  Goal: Normal bowel function is maintained or improved  Outcome: PROGRESSING AS EXPECTED  With recent hospitalization/surgery and immobility has been constipated last recorded BM 10/14 MOM given this am prior to transfer. Is on scheduled senakot.     Problem: Knowledge Deficit  Goal: Knowledge of disease process/condition, treatment plan, diagnostic tests, and medications will improve  Outcome: PROGRESSING AS EXPECTED  Pt knows what is going on and why he is here but not sure if he understands the reasoning why meds ordered  refused lasix this afternoon.

## 2019-10-17 NOTE — DISCHARGE SUMMARY
Discharge Summary    CHIEF COMPLAINT ON ADMISSION  Chief Complaint   Patient presents with   • Groin Pain   • Low Back Pain       Reason for Admission  Groin Pain, Back Pain      Admission Date  9/27/2019    CODE STATUS  Full Code    HPI & HOSPITAL COURSE    44 y.o. male who presented to the emergency department on 9/27/2019 for evaluation of worsening left leg pain.  Patient had a fall about a month ago.  Patient has a problem with his right knee, on schedule for total knee replacement and ever since walking with a cane or walker. He had increasing pain in the left thigh and CT scan showed a left pubic ramus fracture with two large hematomas. Orthopedic surgery did see the patient and recommended no surgical intervention.  The patient had a rising white cell count and increasing pain so hematoma drainage was done by interventional radiology and tiffanie pus was drained consistent with an abscess. MRI of the pelvis showed osteomyelitis and surgical debridement of the pelvic bone was done 10/9 with abscess culture and bone culture both growing MSSA. The patient had a flair of his rheumatoid arthritis but due to the infection and needing antibiotic therapy, immunosuppressive therapy was contraindicated.   After several days/weeks of antibiotics, infectious disease did ok the patient to start steroid therapy. Dr. Conway is his rheumatologist and recommended prednisone to start at 40mg and taper by 10mg every 3 days. The patient did have transient nausea and vomiting that improved with decreasing his MS contin and starting zofran.    Therefore, he is discharged in fair and stable condition to an inpatient rehabilitation hospital.    The patient met 2-midnight criteria for an inpatient stay at the time of discharge.    Discharge Date  10/17/2019    FOLLOW UP ITEMS POST DISCHARGE  Follow up with orthopedic surgery Dr. Olivares to monitor healing of pelvic fracture  Follow up with ID in 4 weeks to repeat MRI to evaluate  osteomyelitis  Monitor cbc with diff and cmp every week while on iv antibiotic therapy    DISCHARGE DIAGNOSES  Principal Problem:    Pubic ramus fracture (HCC) POA: Yes  Active Problems:    Iron deficiency anemia, unspecified iron deficiency anemia type POA: Yes    Osteomyelitis (HCC) POA: Yes    Rheumatoid arthritis (HCC) (Chronic) POA: Yes    Chronic pain of right knee (Chronic) POA: Yes    Essential hypertension POA: Yes  Resolved Problems:    Bilateral leg edema POA: Yes    Hematoma of left thigh POA: Yes    Abscess of left thigh POA: Yes    Sepsis (HCC) POA: No    Candida rash of groin POA: No    Nausea and vomiting POA: No      FOLLOW UP  Future Appointments   Date Time Provider Department Center   10/22/2019  8:30 AM Gerardo Gutierrez M.D. [facility] None   12/10/2019  1:00 PM Pat Valdez M.D. [facility]     Brit Fitch, A.P.R.N.  [address] [address]  [phone]            MEDICATIONS ON DISCHARGE     Medication List      START taking these medications      Instructions   calcium citrate 950 MG Tabs  Start taking on:  10/18/2019  Commonly known as:  CALCITRATE   Take 1 Tab by mouth every day.  Dose:  950 mg     Cholecalciferol 1000 UNIT Tabs  Start taking on:  10/18/2019  Commonly known as:  VITAMIND D3   Take 1 Tab by mouth every day.  Dose:  1,000 Units     enoxaparin 40 MG/0.4ML Soln inj  Start taking on:  10/18/2019  Commonly known as:  LOVENOX   Inject 40 mg as instructed every day.  Dose:  40 mg     miconazole 2 % Crea  Commonly known as:  MICOTIN   Apply 1 g to affected area(s) 2 Times a Day.  Dose:  1 g     morphine ER 15 MG Tbcr tablet  Commonly known as:  MS CONTIN   Take 1 Tab by mouth 3 times a day for 2 days.  Dose:  15 mg     nicotine 14 MG/24HR Pt24  Commonly known as:  NICODERM   Apply 1 Patch to skin as directed every 24 hours.  Dose:  1 Patch     nicotine polacrilex 2 MG Gum  Commonly known as:  NICORETTE   Take 1 Each by mouth every 1 hour as needed for  Smoking Cessation (For nicotine urge).  Dose:  2 mg     NS SOLN 100 mL with ceFAZolin 10 GM SOLR 2 g   2 g by Intravenous route every 8 hours for 33 days.  Dose:  2 g     ondansetron 4 MG Tbdp  Commonly known as:  ZOFRAN ODT   Take 1 Tab by mouth every 8 hours as needed for Nausea.  Dose:  4 mg     senna-docusate 8.6-50 MG Tabs  Commonly known as:  PERICOLACE or SENOKOT S   Take 2 Tabs by mouth 2 Times a Day.  Dose:  2 Tab     simethicone 80 MG Chew  Commonly known as:  MYLICON   Take 1 Tab by mouth 3 times a day as needed (bloating).  Dose:  80 mg        CHANGE how you take these medications      Instructions   acetaminophen 500 MG Tabs  What changed:    · when to take this  · reasons to take this  Commonly known as:  TYLENOL   Take 2 Tabs by mouth 3 times a day.  Dose:  1,000 mg     furosemide 20 MG Tabs  What changed:  when to take this  Commonly known as:  LASIX   Take 1 Tab by mouth 2 Times a Day.  Dose:  20 mg     omeprazole 40 MG delayed-release capsule  What changed:    · medication strength  · when to take this  Commonly known as:  PRILOSEC   Take 1 Cap by mouth 2 Times a Day.  Dose:  40 mg     predniSONE 20 MG Tabs  Start taking on:  10/18/2019  What changed:    · medication strength  · how much to take  · how to take this  · when to take this  · additional instructions  Commonly known as:  DELTASONE   Take 40mg daily for 3 days, then 30mg daily for 3 days, then 20mg daily for 3 days, then 10mg daily for 3 days, then 5mg daily for 3 days. Then stop.     pregabalin 25 MG Caps  What changed:  Another medication with the same name was removed. Continue taking this medication, and follow the directions you see here.  Commonly known as:  LYRICA   Take 1 Cap by mouth 3 times a day for 30 days.  Dose:  25 mg        CONTINUE taking these medications      Instructions   losartan 25 MG Tabs  Start taking on:  10/18/2019  Commonly known as:  COZAAR   Take 1 Tab by mouth every day.  Dose:  25 mg     Misc. Devices  "Misc   One shower chair Dx:Z74.09, Z91.81     oxyCODONE immediate release 10 MG immediate release tablet  Commonly known as:  ROXICODONE   Take 1 Tab by mouth 3 times a day as needed for Severe Pain for up to 30 days.  Dose:  10 mg        STOP taking these medications    ferrous sulfate 325 (65 Fe) MG tablet     HUMIRA 40 MG/0.4ML Pskt  Generic drug:  Adalimumab     lidocaine 5 % Ptch  Commonly known as:  LIDODERM     Naloxone 4 MG/0.1ML Liqd  Commonly known as:  NARCAN     potassium chloride SA 20 MEQ Tbcr  Commonly known as:  Kdur     sucralfate 1 GM Tabs  Commonly known as:  CARAFATE            Allergies  Allergies   Allergen Reactions   • Benadryl [Altaryl]      \"I get agitated\"   • Nsaids      Bleeding, \"I had a bleeding ulcer\"   • Phenergan [Promethazine Hcl]      \"I get very agitated\"   • Penicillins      \"Had some dizziness\"  Tolerated Unasyn 10/2019       DIET  Orders Placed This Encounter   Procedures   • Diet Order Regular     Standing Status:   Standing     Number of Occurrences:   1     Order Specific Question:   Diet:     Answer:   Regular [1]       ACTIVITY  As tolerated and directed by rehab.  Weight bearing as tolerated    CONSULTATIONS  Orthopedic surgery Dr. Olivares  ID Dr. Goldman  Radiology Dr. Olguin    PROCEDURES  Left thigh abscess drain placement 10/2, removed  Left anterior pelvic bone debridement 10/9    LABORATORY  Lab Results   Component Value Date    SODIUM 139 10/15/2019    POTASSIUM 4.4 10/15/2019    CHLORIDE 102 10/15/2019    CO2 26 10/15/2019    GLUCOSE 89 10/15/2019    BUN 9 10/15/2019    CREATININE 0.55 10/15/2019    CREATININE 0.95 10/19/2010    GLOMRATE >59 10/19/2010        Lab Results   Component Value Date    WBC 5.3 10/15/2019    WBC 8.4 10/19/2010    HEMOGLOBIN 8.8 (L) 10/15/2019    HEMATOCRIT 29.0 (L) 10/15/2019    PLATELETCT 339 10/15/2019        Total time of the discharge process exceeds 67 minutes.  "

## 2019-10-17 NOTE — PROGRESS NOTES
Report called to Ade at [facility]. Pt discharging with transport via WC with PICC, all belongings and COBRA.

## 2019-10-17 NOTE — DISCHARGE INSTRUCTIONS
Discharge Instructions per Rhina Varma M.D.    Follow up with primary care provider upon discharge from rehab    DIET: regular    ACTIVITY: as tolerated with therapy    DIAGNOSIS: left thigh abscess and pelvic osteomyelitis with MSSA    Return to ER if fever or new infection occur          Discharge Instructions    Discharged to [facility] by medical transportation with escort. Discharged via wheelchair, hospital escort: Yes.  Special equipment needed: PICC line in place    Be sure to schedule a follow-up appointment with your primary care doctor or any specialists as instructed.     Discharge Plan:   Diet Plan: Discussed  Activity Level: Discussed  Confirmed Follow up Appointment: No (Comments)(transfer to [facility])  Confirmed Symptoms Management: Discussed  Medication Reconciliation Updated: Yes  Influenza Vaccine Indication: Patient Refuses    I understand that a diet low in cholesterol, fat, and sodium is recommended for good health. Unless I have been given specific instructions below for another diet, I accept this instruction as my diet prescription.   Other diet: Regular    Special Instructions: None    · Is patient discharged on Warfarin / Coumadin?   No     Depression / Suicide Risk    As you are discharged from this [facility], it is important to learn how to keep safe from harming yourself.    Recognize the warning signs:  · Abrupt changes in personality, positive or negative- including increase in energy   · Giving away possessions  · Change in eating patterns- significant weight changes-  positive or negative  · Change in sleeping patterns- unable to sleep or sleeping all the time   · Unwillingness or inability to communicate  · Depression  · Unusual sadness, discouragement and loneliness  · Talk of wanting to die  · Neglect of personal appearance   · Rebelliousness- reckless behavior  · Withdrawal from people/activities they love  · Confusion- inability to concentrate     If  you or a loved one observes any of these behaviors or has concerns about self-harm, here's what you can do:  · Talk about it- your feelings and reasons for harming yourself  · Remove any means that you might use to hurt yourself (examples: pills, rope, extension cords, firearm)  · Get professional help from the community (Mental Health, Substance Abuse, psychological counseling)  · Do not be alone:Call your Safe Contact- someone whom you trust who will be there for you.  · Call your local CRISIS HOTLINE 938-9327 or 385-760-4765  · Call your local Children's Mobile Crisis Response Team [place] (700) 405-0171 or www.Orate  · Call the toll free National Suicide Prevention Hotlines   · National Suicide Prevention Lifeline 643-208-BHXK (9434)  · National Hope Line Network 800-SUICIDE (147-0205)

## 2019-10-17 NOTE — PROGRESS NOTES
[facility] Medicine Daily Progress Note    Date of Service  10/16/2019    Chief Complaint  44 y.o. male admitted 9/27/2019 with left thigh pain    Hospital Course    44 y.o. male who presented to the emergency department on 9/27/2019 for evaluation of worsening left leg pain.  Patient had a fall about a month ago.  Patient has a problem with his right knee, on schedule for total knee replacement and ever since walking with a cane or walker. He had increasing pain in the left thigh and CT scan showed a left pubic ramus fracture with two large hematomas. Orthopedic surgery did see the patient and recommended no surgical intervention.  The patient had a rising white cell count and increasing pain so hematoma drainage was done by interventional radiology and tiffanie pus was drained consistent with an abscess. MRI of the pelvis showed osteomyelitis and surgical debridement of the pelvic bone was done 10/9 with abscess culture and bone culture both growing MSSA. The patient had a flair of his rheumatoid arthritis but due to the infection and needing antibiotic therapy, immunosuppressive therapy was contraindicated.        Interval Problem Update  Today the patient is vomiting after he eats with nausea     Consultants/Specialty  Orthopedic surgery  ID    Code Status  full    Disposition  rehab    Review of Systems  Review of Systems   Constitutional: Negative for fever and malaise/fatigue.   HENT: Negative for congestion and sore throat.    Respiratory: Negative for cough, shortness of breath and stridor.    Cardiovascular: Negative for chest pain and leg swelling.   Gastrointestinal: Positive for nausea and vomiting. Negative for abdominal pain, constipation and diarrhea.   Genitourinary: Negative for dysuria and urgency.   Musculoskeletal: Positive for joint pain and myalgias. Negative for back pain.   Skin: Negative for rash.   Neurological: Negative for dizziness, tremors and headaches.        Physical Exam  Temp:  [36.5  °C (97.7 °F)-36.8 °C (98.2 °F)] 36.8 °C (98.2 °F)  Pulse:  [84-99] 89  Resp:  [14-18] 16  BP: ()/(59-75) 118/75  SpO2:  [95 %-96 %] 96 %    Physical Exam   Constitutional: He is oriented to person, place, and time. No distress.   HENT:   Mouth/Throat: Oropharynx is clear and moist. No oropharyngeal exudate.   Eyes: Conjunctivae and EOM are normal.   Neck: Neck supple. No tracheal deviation present.   Cardiovascular: Normal rate and regular rhythm.   No murmur heard.  Pulmonary/Chest: Effort normal and breath sounds normal. No stridor. No respiratory distress. He has no wheezes.   Abdominal: Soft. Bowel sounds are normal. He exhibits no distension.   Musculoskeletal: He exhibits no edema.   Lymphadenopathy:     He has no cervical adenopathy.   Neurological: He is alert and oriented to person, place, and time. Coordination abnormal.   Skin: No rash noted. He is not diaphoretic. No erythema. There is pallor.   Psychiatric: His behavior is normal. Thought content normal.   Nursing note and vitals reviewed.      Fluids    Intake/Output Summary (Last 24 hours) at 10/16/2019 1706  Last data filed at 10/16/2019 1354  Gross per 24 hour   Intake 2460 ml   Output 4250 ml   Net -1790 ml       Laboratory  Recent Labs     10/14/19  0411 10/15/19  0958   WBC 5.4 5.3   RBC 3.31* 3.31*   HEMOGLOBIN 8.9* 8.8*   HEMATOCRIT 29.4* 29.0*   MCV 88.8 87.6   MCH 26.9* 26.6*   MCHC 30.3* 30.3*   RDW 64.4* 62.2*   PLATELETCT 377 339   MPV 8.7* 8.6*     Recent Labs     10/14/19  0411 10/15/19  0927   SODIUM 139 139   POTASSIUM 4.3 4.4   CHLORIDE 103 102   CO2 25 26   GLUCOSE 102* 89   BUN 10 9   CREATININE 0.78 0.55   CALCIUM 8.8 8.9                   Imaging  IR-PICC LINE PLACEMENT W/ GUIDANCE > AGE 5   Final Result                  Ultrasound-guided PICC placement performed by qualified nursing staff as    above.          CT-ABDOMEN-PELVIS WITH   Final Result      1.  Interval decrease in the intramuscular abscesses involving the left  pelvis and thigh following drain placement. Collection in the pubococcygeus muscle is similar in size to the prior exam.      2.  Ventral hernia containing anterior bowel wall and fat. No secondary obstruction.      US-EXTREMITY NON VASCULAR UNILATERAL RIGHT   Final Result      Loculated collection in the posteromedial left knee likely represents a Baker's cyst. No significant knee effusion is identified in the upper knee joint on recent MRI.      MR-FEMUR-W/O LEFT   Final Result      1.  Limited exam secondary to significant patient motion.   2.  Subacute fracture involving the left pubic bone with osteomyelitis and probable intraosseous abscess.   3.  Extensive edema and enlargement of the muscles of the medial compartment, pectineus muscle, and pubococcygeus muscle is consistent with known intramuscular abscesses. Fluid collection is most extensive in the adductor longus muscle.   4.  Subacute fractures of the sacrum               Comment: Results given to Dr. Varma through [facility] text at 3:56 PM      MR-PELVIS W/O   Final Result      1.  Limited exam secondary to significant patient motion.   2.  Subacute fracture involving the left pubic bone with osteomyelitis and probable intraosseous abscess.   3.  Extensive edema and enlargement of the muscles of the medial compartment, pectineus muscle, and pubococcygeus muscle is consistent with known intramuscular abscesses. Fluid collection is most extensive in the adductor longus muscle.   4.  Subacute fractures of the sacrum               Comment: Results given to Dr. Varma through [facility] text at 3:56 PM      CT-DRAIN-HEMATOMA - SEROMA   Final Result      1.  Left inguinal region muscular abscess.   2.  CT-guided drainage and catheter placement of large collection in the left obturator externus.   3.  CT-guided aspiration of smaller collection in the left pectineus muscle.      US-RUQ   Final Result      Hepatomegaly.      Status post cholecystectomy.      Dilated  portal vein.      Status post cholecystectomy.      No biliary ductal dilatation.      Limited evaluation of the pancreas which is obscured.      MR-THORACIC SPINE-W/O   Final Result      1.  No acute fracture in the thoracic spine.   2.  Mild chronic compression deformity at T11.      MR-LUMBAR SPINE-W/O   Final Result      1.  Acute bilateral sacral alae fractures.   2.  There is edema in the lower posterior paraspinal soft tissue likely representing soft tissue contusion.   3.  There is no lumbar spine fractures.         CT-LSPINE W/O PLUS RECONS   Final Result      No acute fracture or traumatic malalignment.      CT-PELVIS WITH   Final Result      1.  Recent but probably subacute fracture of the left superior pubic ramus near the symphysis. Adjacent and just lateral to this are two complex masslike structures, the largest and most medial of which contains a fluid-filled cavity. These are    consistent with hematomata, possibly intramuscular.      US-EXTREMITY VENOUS LOWER UNILAT LEFT   Final Result      1.  No evidence of Left  lower extremity deep venous thrombosis.   2.  Two complex areas within the left groin, consistent with hematomata.            INTERPRETING LOCATION: [address] [facility], [facility], [address]           Assessment/Plan  * Pubic ramus fracture (HCC)  Assessment & Plan  S/p mechanical GLF  Taper ms contin, start scheduled tylenol  Rehab transfer tomorrow    Nausea and vomiting  Assessment & Plan  After eating meals, started last night after dinner   Will order scheduled zofran, continue prilosec, taper ms contin  Last BM was 10/14, continue bowel regimen    Osteomyelitis (HCC)  Assessment & Plan  MRI femus shows Subacute fracture involving the left pubic bone with osteomyelitis surgical debridement was done 10/9  Ancef to continue for MSSA         Candida rash of groin  Assessment & Plan  Resolved.      Sepsis (HCC)  Assessment & Plan  Resolved, not present on admission      Abscess of left  thigh  Assessment & Plan  Multiple abscesses found on imaging CT/MRI  Cultures from abscess and bone have grown MSSA  IV ancef through 11/19/2019 per ID      Hematoma of left thigh  Assessment & Plan        Bilateral leg edema  Assessment & Plan  Improved currently, likely recurrences related to low albumin    Iron deficiency anemia, unspecified iron deficiency anemia type- (present on admission)  Assessment & Plan  Stable s/p iv iron        Essential hypertension- (present on admission)  Assessment & Plan  Controlled on Coreg, Losartan and lasix.     Chronic pain of right knee- (present on admission)  Assessment & Plan  Ms contin added this hospital stay, will taper dose  Pain is worse with rheumatoid arthritis flair currently, I discussed this with rheumatology Dr. Conway  who recommends starting prednisone at 40mg daily with a 10mg taper every 3 days down to 5mg daily for 3 days then stop.   Discussed with ID again today and ok to start prednisone therapy      Rheumatoid arthritis (HCC)- (present on admission)  Assessment & Plan  Will use prednisone as suggested by Dr. Conway   Ok to use per ID Dr. Goldman       VTE prophylaxis: lovenox

## 2019-10-17 NOTE — CARE PLAN
Problem: Communication  Goal: The ability to communicate needs accurately and effectively will improve  Outcome: PROGRESSING AS EXPECTED  Note:   Plan of care discussed, pt verbalizes understanding      Problem: Safety  Goal: Will remain free from injury  Outcome: PROGRESSING AS EXPECTED  Note:   Call light within reach, hourly rounding      Problem: Venous Thromboembolism (VTW)/Deep Vein Thrombosis (DVT) Prevention:  Goal: Patient will participate in Venous Thrombosis (VTE)/Deep Vein Thrombosis (DVT)Prevention Measures  Outcome: PROGRESSING AS EXPECTED  Flowsheets (Taken 10/17/2019 0911)  SCDs, Sequential Compression Device: On  Pharmacologic Prophylaxis Used: LMWH: Enoxaparin(Lovenox)     Problem: Bowel/Gastric:  Goal: Will not experience complications related to bowel motility  Outcome: PROGRESSING SLOWER THAN EXPECTED  Note:   Milk of magnesia given for constipation

## 2019-10-17 NOTE — PROGRESS NOTES
Report received from day shift RN, POC and meds reviewed with pt, pt verbalized understanding, denies SOB, pain tolerable at 5/10, abdominal dressing C/D/I. Safety measures and hourly rounding in place.

## 2019-10-18 LAB
25(OH)D3 SERPL-MCNC: 20 NG/ML (ref 30–100)
ALBUMIN SERPL BCP-MCNC: 2.7 G/DL (ref 3.2–4.9)
ALBUMIN/GLOB SERPL: 0.8 G/DL
ALP SERPL-CCNC: 80 U/L (ref 30–99)
ALT SERPL-CCNC: <5 U/L (ref 2–50)
ANION GAP SERPL CALC-SCNC: 5 MMOL/L (ref 0–11.9)
ANISOCYTOSIS BLD QL SMEAR: ABNORMAL
AST SERPL-CCNC: 10 U/L (ref 12–45)
BASOPHILS # BLD AUTO: 1 % (ref 0–1.8)
BASOPHILS # BLD: 0.08 K/UL (ref 0–0.12)
BILIRUB SERPL-MCNC: 0.2 MG/DL (ref 0.1–1.5)
BUN SERPL-MCNC: 13 MG/DL (ref 8–22)
CALCIUM SERPL-MCNC: 8.6 MG/DL (ref 8.5–10.5)
CHLORIDE SERPL-SCNC: 106 MMOL/L (ref 96–112)
CO2 SERPL-SCNC: 27 MMOL/L (ref 20–33)
COMMENT 1642: NORMAL
CREAT SERPL-MCNC: 0.51 MG/DL (ref 0.5–1.4)
EOSINOPHIL # BLD AUTO: 0.04 K/UL (ref 0–0.51)
EOSINOPHIL NFR BLD: 0.5 % (ref 0–6.9)
ERYTHROCYTE [DISTWIDTH] IN BLOOD BY AUTOMATED COUNT: 68 FL (ref 35.9–50)
EST. AVERAGE GLUCOSE BLD GHB EST-MCNC: 91 MG/DL
GLOBULIN SER CALC-MCNC: 3.4 G/DL (ref 1.9–3.5)
GLUCOSE SERPL-MCNC: 78 MG/DL (ref 65–99)
HBA1C MFR BLD: 4.8 % (ref 0–5.6)
HCT VFR BLD AUTO: 27.7 % (ref 42–52)
HGB BLD-MCNC: 8.3 G/DL (ref 14–18)
IMM GRANULOCYTES # BLD AUTO: 0.11 K/UL (ref 0–0.11)
IMM GRANULOCYTES NFR BLD AUTO: 1.4 % (ref 0–0.9)
LYMPHOCYTES # BLD AUTO: 2.89 K/UL (ref 1–4.8)
LYMPHOCYTES NFR BLD: 35.9 % (ref 22–41)
MACROCYTES BLD QL SMEAR: ABNORMAL
MCH RBC QN AUTO: 27.5 PG (ref 27–33)
MCHC RBC AUTO-ENTMCNC: 30 G/DL (ref 33.7–35.3)
MCV RBC AUTO: 91.7 FL (ref 81.4–97.8)
MICROCYTES BLD QL SMEAR: ABNORMAL
MONOCYTES # BLD AUTO: 0.63 K/UL (ref 0–0.85)
MONOCYTES NFR BLD AUTO: 7.8 % (ref 0–13.4)
MORPHOLOGY BLD-IMP: NORMAL
NEUTROPHILS # BLD AUTO: 4.31 K/UL (ref 1.82–7.42)
NEUTROPHILS NFR BLD: 53.4 % (ref 44–72)
NRBC # BLD AUTO: 0 K/UL
NRBC BLD-RTO: 0 /100 WBC
PLATELET # BLD AUTO: 336 K/UL (ref 164–446)
PLATELET BLD QL SMEAR: NORMAL
PMV BLD AUTO: 8.7 FL (ref 9–12.9)
POTASSIUM SERPL-SCNC: 3.8 MMOL/L (ref 3.6–5.5)
PROT SERPL-MCNC: 6.1 G/DL (ref 6–8.2)
RBC # BLD AUTO: 3.02 M/UL (ref 4.7–6.1)
RBC BLD AUTO: PRESENT
SODIUM SERPL-SCNC: 138 MMOL/L (ref 135–145)
TSH SERPL DL<=0.005 MIU/L-ACNC: 4.77 UIU/ML (ref 0.38–5.33)
WBC # BLD AUTO: 8.1 K/UL (ref 4.8–10.8)

## 2019-10-18 PROCEDURE — A9270 NON-COVERED ITEM OR SERVICE: HCPCS | Performed by: PHYSICAL MEDICINE & REHABILITATION

## 2019-10-18 PROCEDURE — 97530 THERAPEUTIC ACTIVITIES: CPT

## 2019-10-18 PROCEDURE — 97116 GAIT TRAINING THERAPY: CPT

## 2019-10-18 PROCEDURE — 97162 PT EVAL MOD COMPLEX 30 MIN: CPT

## 2019-10-18 PROCEDURE — 770010 HCHG ROOM/CARE - REHAB SEMI PRIVAT*

## 2019-10-18 PROCEDURE — 97166 OT EVAL MOD COMPLEX 45 MIN: CPT

## 2019-10-18 PROCEDURE — 85025 COMPLETE CBC W/AUTO DIFF WBC: CPT

## 2019-10-18 PROCEDURE — 83036 HEMOGLOBIN GLYCOSYLATED A1C: CPT

## 2019-10-18 PROCEDURE — 80053 COMPREHEN METABOLIC PANEL: CPT

## 2019-10-18 PROCEDURE — 82306 VITAMIN D 25 HYDROXY: CPT

## 2019-10-18 PROCEDURE — 97535 SELF CARE MNGMENT TRAINING: CPT

## 2019-10-18 PROCEDURE — 700111 HCHG RX REV CODE 636 W/ 250 OVERRIDE (IP): Performed by: PHYSICAL MEDICINE & REHABILITATION

## 2019-10-18 PROCEDURE — 99233 SBSQ HOSP IP/OBS HIGH 50: CPT | Performed by: PHYSICAL MEDICINE & REHABILITATION

## 2019-10-18 PROCEDURE — 700105 HCHG RX REV CODE 258: Performed by: PHYSICAL MEDICINE & REHABILITATION

## 2019-10-18 PROCEDURE — 84443 ASSAY THYROID STIM HORMONE: CPT

## 2019-10-18 PROCEDURE — 700101 HCHG RX REV CODE 250: Performed by: PHYSICAL MEDICINE & REHABILITATION

## 2019-10-18 PROCEDURE — 700102 HCHG RX REV CODE 250 W/ 637 OVERRIDE(OP): Performed by: PHYSICAL MEDICINE & REHABILITATION

## 2019-10-18 RX ORDER — FAMCICLOVIR 500 MG/1
1500 TABLET ORAL
Status: COMPLETED | OUTPATIENT
Start: 2019-10-18 | End: 2019-10-18

## 2019-10-18 RX ORDER — PREGABALIN 75 MG/1
75 CAPSULE ORAL 3 TIMES DAILY
Status: DISCONTINUED | OUTPATIENT
Start: 2019-10-18 | End: 2019-10-28

## 2019-10-18 RX ORDER — ACYCLOVIR 50 MG/G
CREAM TOPICAL
Status: DISCONTINUED | OUTPATIENT
Start: 2019-10-18 | End: 2019-10-18

## 2019-10-18 RX ADMIN — ACETAMINOPHEN 1000 MG: 500 TABLET, FILM COATED ORAL at 14:18

## 2019-10-18 RX ADMIN — OMEPRAZOLE 40 MG: 20 CAPSULE, DELAYED RELEASE ORAL at 21:14

## 2019-10-18 RX ADMIN — OXYCODONE HYDROCHLORIDE 10 MG: 10 TABLET ORAL at 01:44

## 2019-10-18 RX ADMIN — ENOXAPARIN SODIUM 40 MG: 100 INJECTION SUBCUTANEOUS at 08:15

## 2019-10-18 RX ADMIN — CALCIUM CITRATE 200 MG (950 MG) TABLET 950 MG: at 08:21

## 2019-10-18 RX ADMIN — PREGABALIN 75 MG: 75 CAPSULE ORAL at 21:14

## 2019-10-18 RX ADMIN — PREDNISONE 40 MG: 20 TABLET ORAL at 08:14

## 2019-10-18 RX ADMIN — TRAZODONE HYDROCHLORIDE 50 MG: 50 TABLET ORAL at 01:50

## 2019-10-18 RX ADMIN — OXYCODONE HYDROCHLORIDE 10 MG: 10 TABLET ORAL at 06:23

## 2019-10-18 RX ADMIN — SENNOSIDES AND DOCUSATE SODIUM 2 TABLET: 8.6; 5 TABLET ORAL at 21:14

## 2019-10-18 RX ADMIN — PREGABALIN 75 MG: 75 CAPSULE ORAL at 14:19

## 2019-10-18 RX ADMIN — FERROUS SULFATE TAB 325 MG (65 MG ELEMENTAL FE) 325 MG: 325 (65 FE) TAB at 08:14

## 2019-10-18 RX ADMIN — ACETAMINOPHEN 1000 MG: 500 TABLET, FILM COATED ORAL at 08:14

## 2019-10-18 RX ADMIN — MORPHINE SULFATE 15 MG: 15 TABLET, FILM COATED, EXTENDED RELEASE ORAL at 08:14

## 2019-10-18 RX ADMIN — LOSARTAN POTASSIUM 25 MG: 25 TABLET ORAL at 08:13

## 2019-10-18 RX ADMIN — CEFAZOLIN 2 G: 10 INJECTION, POWDER, FOR SOLUTION INTRAVENOUS at 05:11

## 2019-10-18 RX ADMIN — PREGABALIN 25 MG: 25 CAPSULE ORAL at 08:21

## 2019-10-18 RX ADMIN — Medication 20 ML: at 22:17

## 2019-10-18 RX ADMIN — ONDANSETRON 4 MG: 4 TABLET, ORALLY DISINTEGRATING ORAL at 22:07

## 2019-10-18 RX ADMIN — NICOTINE 14 MG: 14 PATCH, EXTENDED RELEASE TRANSDERMAL at 05:11

## 2019-10-18 RX ADMIN — OXYCODONE HYDROCHLORIDE 10 MG: 10 TABLET ORAL at 14:18

## 2019-10-18 RX ADMIN — ACETAMINOPHEN 1000 MG: 500 TABLET, FILM COATED ORAL at 21:12

## 2019-10-18 RX ADMIN — CEFAZOLIN 2 G: 10 INJECTION, POWDER, FOR SOLUTION INTRAVENOUS at 15:01

## 2019-10-18 RX ADMIN — VITAMIN D, TAB 1000IU (100/BT) 1000 UNITS: 25 TAB at 08:13

## 2019-10-18 RX ADMIN — CLOTRIMAZOLE AND BETAMETHASONE DIPROPIONATE: 10; .5 CREAM TOPICAL at 22:09

## 2019-10-18 RX ADMIN — CEFAZOLIN 2 G: 10 INJECTION, POWDER, FOR SOLUTION INTRAVENOUS at 21:23

## 2019-10-18 RX ADMIN — SENNOSIDES AND DOCUSATE SODIUM 2 TABLET: 8.6; 5 TABLET ORAL at 08:15

## 2019-10-18 RX ADMIN — OMEPRAZOLE 40 MG: 20 CAPSULE, DELAYED RELEASE ORAL at 08:13

## 2019-10-18 RX ADMIN — CLOTRIMAZOLE AND BETAMETHASONE DIPROPIONATE: 10; .5 CREAM TOPICAL at 08:21

## 2019-10-18 RX ADMIN — FAMCICLOVIR 1500 MG: 500 TABLET, FILM COATED ORAL at 18:46

## 2019-10-18 RX ADMIN — OXYCODONE HYDROCHLORIDE 10 MG: 10 TABLET ORAL at 18:46

## 2019-10-18 RX ADMIN — OXYCODONE HYDROCHLORIDE 10 MG: 10 TABLET ORAL at 10:19

## 2019-10-18 RX ADMIN — MORPHINE SULFATE 15 MG: 15 TABLET, FILM COATED, EXTENDED RELEASE ORAL at 21:12

## 2019-10-18 RX ADMIN — MORPHINE SULFATE 15 MG: 15 TABLET, FILM COATED, EXTENDED RELEASE ORAL at 14:19

## 2019-10-18 RX ADMIN — OXYCODONE HYDROCHLORIDE 10 MG: 10 TABLET ORAL at 22:08

## 2019-10-18 ASSESSMENT — BRIEF INTERVIEW FOR MENTAL STATUS (BIMS)
BIMS SUMMARY SCORE: 12
INITIAL REPETITION OF BED BLUE SOCK - FIRST ATTEMPT: 3
ASKED TO RECALL SOCK: YES, AFTER CUEING (SOMETHING TO WEAR")"
WHAT DAY OF THE WEEK IS IT: CORRECT
ASKED TO RECALL BED: NO, COULD NOT RECALL
WHAT MONTH IS IT: ACCURATE WITHIN 5 DAYS
WHAT YEAR IS IT: CORRECT
ASKED TO RECALL BLUE: YES, NO CUE REQUIRED

## 2019-10-18 ASSESSMENT — PAIN SCALES - WONG BAKER
WONGBAKER_NUMERICALRESPONSE: HURTS A LITTLE MORE
WONGBAKER_NUMERICALRESPONSE: HURTS A LITTLE MORE

## 2019-10-18 ASSESSMENT — ACTIVITIES OF DAILY LIVING (ADL): TOILETING: INDEPENDENT

## 2019-10-18 NOTE — THERAPY
Physical Therapy   Daily Treatment     Patient Name: Richard Hubbard II  Age:  44 y.o., Sex:  male  Medical Record #: [id number]  Today's Date: 10/18/2019     Precautions  Precautions: Weight Bearing As Tolerated Right Lower Extremity, Weight Bearing As Tolerated Left Lower Extremity, Fall Risk  Comments: R knee brace    Subjective    Pt resting in bed, reports being sore from his am therapies but willing to participate     Objective       10/18/19 1431   PT Total Time Spent   PT Individual Total Time Spent (Mins) 30   PT Charge Group   PT Gait Training 1   PT Therapeutic Activities 1       Gait endurance with // bars and SBA/ CGA 15 ft x 3. Pt attempted to increase LE WB with gait but experienced increased R knee pain and L groin pain.  Transfer training wc<>bed with FWW.  Orthotist consult / collaboration for arch support/ orthotics for foot/joint protection    Assessment    Pt pleasant and cooperative but with significant debility/ pain and debilitating RA to limit activity and mobility at this time    Plan    OOB activity tolerance/ gait with // bars vs FWW, joint protection education

## 2019-10-18 NOTE — PROGRESS NOTES
"Rehab Progress Note     Encounter Date: 10/18/2019    CC: left pelvic fracture, pain, RA    Interval Events (Subjective)  Patient sitting up in bed. He reports he is doing well. Denies NVD. Reviewed admission labs including stable anemia and low vitamin D. He has questions about why he is not on prednisone. Verified in the computer that he is on prednisone and that he received this AM.  Otherwise discussed with patient about pain control. Discussed will increase Lyrica.     Objective:  VITAL SIGNS: /81   Pulse 72   Temp 36.7 °C (98.1 °F) (Temporal)   Resp 18   Ht 1.803 m (5' 11\") Comment: per patient  Wt 91.6 kg (201 lb 15.1 oz)   SpO2 96%   BMI 28.17 kg/m²   Gen: NAD  Psych: Mood and affect appropriate  CV: RRR, no edema  Resp: CTAB, no upper airway sounds  Abd: NTND  Neuro: AOx4, 5/5 BUE    Recent Results (from the past 72 hour(s))   CBC with Differential    Collection Time: 10/18/19  5:18 AM   Result Value Ref Range    WBC 8.1 4.8 - 10.8 K/uL    RBC 3.02 (L) 4.70 - 6.10 M/uL    Hemoglobin 8.3 (L) 14.0 - 18.0 g/dL    Hematocrit 27.7 (L) 42.0 - 52.0 %    MCV 91.7 81.4 - 97.8 fL    MCH 27.5 27.0 - 33.0 pg    MCHC 30.0 (L) 33.7 - 35.3 g/dL    RDW 68.0 (H) 35.9 - 50.0 fL    Platelet Count 336 164 - 446 K/uL    MPV 8.7 (L) 9.0 - 12.9 fL    Neutrophils-Polys 53.40 44.00 - 72.00 %    Lymphocytes 35.90 22.00 - 41.00 %    Monocytes 7.80 0.00 - 13.40 %    Eosinophils 0.50 0.00 - 6.90 %    Basophils 1.00 0.00 - 1.80 %    Immature Granulocytes 1.40 (H) 0.00 - 0.90 %    Nucleated RBC 0.00 /100 WBC    Neutrophils (Absolute) 4.31 1.82 - 7.42 K/uL    Lymphs (Absolute) 2.89 1.00 - 4.80 K/uL    Monos (Absolute) 0.63 0.00 - 0.85 K/uL    Eos (Absolute) 0.04 0.00 - 0.51 K/uL    Baso (Absolute) 0.08 0.00 - 0.12 K/uL    Immature Granulocytes (abs) 0.11 0.00 - 0.11 K/uL    NRBC (Absolute) 0.00 K/uL    Anisocytosis 2+     Macrocytosis 1+     Microcytosis 1+    Comp Metabolic Panel (CMP)    Collection Time: 10/18/19  5:18 AM "   Result Value Ref Range    Sodium 138 135 - 145 mmol/L    Potassium 3.8 3.6 - 5.5 mmol/L    Chloride 106 96 - 112 mmol/L    Co2 27 20 - 33 mmol/L    Anion Gap 5.0 0.0 - 11.9    Glucose 78 65 - 99 mg/dL    Bun 13 8 - 22 mg/dL    Creatinine 0.51 0.50 - 1.40 mg/dL    Calcium 8.6 8.5 - 10.5 mg/dL    AST(SGOT) 10 (L) 12 - 45 U/L    ALT(SGPT) <5 2 - 50 U/L    Alkaline Phosphatase 80 30 - 99 U/L    Total Bilirubin 0.2 0.1 - 1.5 mg/dL    Albumin 2.7 (L) 3.2 - 4.9 g/dL    Total Protein 6.1 6.0 - 8.2 g/dL    Globulin 3.4 1.9 - 3.5 g/dL    A-G Ratio 0.8 g/dL   TSH with Reflex to FT4    Collection Time: 10/18/19  5:18 AM   Result Value Ref Range    TSH 4.770 0.380 - 5.330 uIU/mL   Vitamin D, 25-hydroxy (blood)    Collection Time: 10/18/19  5:18 AM   Result Value Ref Range    25-Hydroxy   Vitamin D 25 20 (L) 30 - 100 ng/mL   ESTIMATED GFR    Collection Time: 10/18/19  5:18 AM   Result Value Ref Range    GFR If African American >60 >60 mL/min/1.73 m 2    GFR If Non African American >60 >60 mL/min/1.73 m 2   PERIPHERAL SMEAR REVIEW    Collection Time: 10/18/19  5:18 AM   Result Value Ref Range    Peripheral Smear Review see below    PLATELET ESTIMATE    Collection Time: 10/18/19  5:18 AM   Result Value Ref Range    Plt Estimation Normal    MORPHOLOGY    Collection Time: 10/18/19  5:18 AM   Result Value Ref Range    RBC Morphology Present    DIFFERENTIAL COMMENT    Collection Time: 10/18/19  5:18 AM   Result Value Ref Range    Comments-Diff see below        Current Facility-Administered Medications   Medication Frequency   • pregabalin (LYRICA) capsule 75 mg TID   • Respiratory Care per Protocol Continuous RT   • Pharmacy Consult Request ...Pain Management Review 1 Each PHARMACY TO DOSE   • oxyCODONE immediate-release (ROXICODONE) tablet 5 mg Q3HRS PRN   • oxyCODONE immediate release (ROXICODONE) tablet 10 mg Q3HRS PRN   • tramadol (ULTRAM) 50 MG tablet 50 mg Q4HRS PRN   • hydrALAZINE (APRESOLINE) tablet 25 mg Q8HRS PRN   •  acetaminophen (TYLENOL) tablet 650 mg Q4HRS PRN   • senna-docusate (PERICOLACE or SENOKOT S) 8.6-50 MG per tablet 2 Tab BID    And   • polyethylene glycol/lytes (MIRALAX) PACKET 1 Packet QDAY PRN    And   • magnesium hydroxide (MILK OF MAGNESIA) suspension 30 mL QDAY PRN    And   • bisacodyl (DULCOLAX) suppository 10 mg QDAY PRN   • artificial tears ophthalmic solution 1 Drop PRN   • benzocaine-menthol (CEPACOL) lozenge 1 Lozenge Q2HRS PRN   • mag hydrox-al hydrox-simeth (MAALOX PLUS ES or MYLANTA DS) suspension 20 mL Q2HRS PRN   • ondansetron (ZOFRAN ODT) dispertab 4 mg 4X/DAY PRN    Or   • ondansetron (ZOFRAN) syringe/vial injection 4 mg 4X/DAY PRN   • traZODone (DESYREL) tablet 50 mg QHS PRN   • sodium chloride (OCEAN) 0.65 % nasal spray 2 Spray PRN   • nicotine (NICODERM) 14 MG/24HR 14 mg Daily-0600    And   • nicotine polacrilex (NICORETTE) 2 MG piece 2 mg Q HOUR PRN   • acetaminophen (TYLENOL) tablet 1,000 mg TID   • calcium citrate (CALCITRATE) tablet 950 mg DAILY   • ceFAZolin (ANCEF) 2 g in  mL IVPB Q8HRS   • enoxaparin (LOVENOX) inj 40 mg DAILY   • ferrous sulfate tablet 325 mg DAILY   • furosemide (LASIX) tablet 20 mg BID DIURETIC   • losartan (COZAAR) tablet 25 mg DAILY   • morphine ER (MS CONTIN) tablet 15 mg TID   • omeprazole (PRILOSEC) capsule 40 mg BID   • predniSONE (DELTASONE) tablet 40 mg DAILY   • simethicone (MYLICON) chewable tab 80 mg TID PRN   • vitamin D (cholecalciferol) tablet 1,000 Units DAILY   • clotrimazole-betamethasone (LOTRISONE) 1-0.05 % cream BID       Orders Placed This Encounter   Procedures   • Diet Order Regular     Standing Status:   Standing     Number of Occurrences:   1     Order Specific Question:   Diet:     Answer:   Regular [1]       Assessment:  Active Hospital Problems    Diagnosis   • *Pubic ramus fracture (HCC)   • Rheumatoid arthritis (HCC)   • Iron deficiency anemia, unspecified iron deficiency anemia type   • MONICA (obstructive sleep apnea)   • Depression        Medical Decision Making and Plan:  Left pelvic fracture - managed conservatively, found to have abscess in thigh status post drainage.   -PT and OT for mobility and ADLs  -Follow-up with Orthopedics     Pain - Patient with left hip and right knee. Was supposed to get right knee replacement prior to falls and hospitalization.  -On Morphine 15 mg BID, Lyrica 25 mg TID, and scheduled Tylenol. With PRN Oxycodone.   -Increase Lyrica to 75 mg TID     Left thigh mass/osteomyelitis - s/p drainage with MSSA growing from cultures.    -Continue Ancef 2 g q8h until 11/22/19.  Recommending weekly CBC and CMP     Fungal rash - Patient with diffuse fungal rash. Patient s/p fluconazole for 10 days. Per pharmacy no miconazole cream currently. Will switch to clotrimazole cream BID     HTN/AS - Patient on Losartan 25 mg daily, Lasix 20 mg BID      Anemia - Patient on Fe supplement on transfer. Check AM CBC - 8.3, stable. Consider Iron panel check      RA - Patient previously on DMARD, now on hold while has active osteomyelitis. On Predinsone 40 mg daily     Nicotine dependence - On patch on transfer. Will need teaching.      GERD - History of PUD. Continue Omeprazole.     Vitamin D deficiency - 1000 U on transfer. Check level - 20.  Increase to 2000 U daily     DVT ppx - Patient on Lovenox on transfer.      Total time:  35 minutes.  I spent greater than 50% of the time for patient care and coordination on this date, including unit/floor time, and face-to-face time with the patient as per assessment and plan above.  Discussed pain management, increase Lyrica, admission labs and discussed wound care.     Keaton Pisano M.D.

## 2019-10-18 NOTE — FLOWSHEET NOTE
10/17/19 1927   Type of Assessment   Assessment Yes   Patient History   Pulmonary Diagnosis None   Surgical Procedures CT guided drainage of lower abdominal abscesses   Home O2 No   Home Treatments/Frequency No   COPD Risk Screening   Do you have a history of COPD? No   COPD Population Screener   During the past 4 weeks, how much did you feel short of breath? 0   Do you ever cough up any mucus or phlegm? 0   In the past 12 months, you do less than you used to because of your breathing problems 0   Have you smoked at least 100 cigarettes in your entire life? 2   How old are you? 0   COPD Screening Score 2   Smoking History   Have you ever smoked Yes   Have you smoked in the last 12 months Yes   Have you quit smoking No   Smoking Cessation Offered Patient Counseled   Level Of Consciousness   Level of Consciousness Alert   Respiratory WDL   Respiratory (WDL) WDL   Chest Exam   Respiration 16   Pulse 96   Oximetry   #Pulse Oximetry (Single Determination) Yes   Oxygen   Home O2 Use Prior To Admission? No   Pulse Oximetry 96 %   O2 Daily Delivery Respiratory  Room Air with O2 Available

## 2019-10-18 NOTE — THERAPY
Physical Therapy   Initial Evaluation     Patient Name: Richard Hubbard II  Age:  44 y.o., Sex:  male  Medical Record #: [id number]  Today's Date: 10/18/2019     Subjective    Pt resting in bed, willing to participate but reports pain and fatigue     Objective       10/18/19 0831   Prior Living Situation   Prior Services Home-Independent   Housing / Facility 1 Story House   Steps Into Home 2   Steps In Home 0   Rail None   Equipment Owned Front-Wheel Walker;Single Point Cane   Lives with - Patient's Self Care Capacity Spouse;Child Less than 18 Years of Age   Prior Level of Functional Mobility   Bed Mobility Independent   Transfer Status Independent   Ambulation Independent   Distance Ambulation (Feet)   (household/ minimal community)   Assistive Devices Used Front-Wheel Walker   Stairs Independent   Comments pt reports using SPC unitl sustaining a fall, then using FWW for the past month+   IRF-ARTURO:  Prior Functioning: Everyday Activities   Self Care Independent   Indoor Mobility (Ambulation) Independent   Stairs Independent   Functional Cognition Independent   Prior Device Use Walker   Pain 0 - 10 Group   Location Hip;Pelvis   Location Orientation Left   Pain Rating Scale (NPRS) 4   Therapist Pain Assessment Prior to Activity;During Activity;Post Activity  (Pt with R knee pain with mobility)   Passive ROM Lower Body   Passive ROM Lower Body X   Gross Passive ROM Gross Passive Range of Motion Impaired,  but Appears Adequate for Functional Mobility.   Comments limited by pain and RA instability   Active ROM Lower Body    Active ROM Lower Body  X   Gross Active ROM Gross Active Range of Motion Impaired,  but Appears Adequate for Functional Mobility.   Comments limited by pain and RA instability   Strength Lower Body   Lower Body Strength  X   Gross Strength Generalized Weakness, Equal Bilaterally   Comments limited tolerance for MMT due to pain/ RA but at least 3 to 3+ throughout   Bed Mobility    Supine to Sit  Stand by Assist   Sit to Supine Moderate Assist   Sit to Stand Minimal Assist   Scooting Stand by Assist   Rolling Supervised   Neurological Concerns   Neurological Concerns No   IRF-ARTURO:  Roll Left and Right   Assistance Needed Adaptive equipment;Supervision   CARE Score 4   Discharge Goal:  Assistance Needed Independent   Discharge Goal:  Score 6   IRF-ARTURO:  Sit to Lying   Assistance Needed Adaptive equipment;Physical assistance   Physical Assistance Level 50%-74%   CARE Score 2   Discharge Goal:  Assistance Needed Independent   Discharge Goal:  Score 6   IRF-ARTURO:  Lying to Sitting on Side of Bed   Assistance Needed Adaptive equipment;Supervision   CARE Score 4   Discharge Goal:  Assistance Needed Independent   Discharge Goal:  Score 6   IRF-ARTURO:  Sit to Stand   Assistance Needed Adaptive equipment;Physical assistance   Physical Assistance Level 25%-49%   CARE Score 3   Discharge Goal:  Assistance Needed Independent;Adaptive equipment   Discahrge Goal:  Score 6   IRF-ARTURO:  Chair/Bed-to-Chair Transfer   Assistance Needed Physical assistance;Adaptive equipment   Physical Assistance Level Less than 25%   CARE Score 3   Discharge Goal:  Assistance Needed Independent;Adaptive equipment   Discharge Goal:  Score 6   IRF-ARTURO:  Car Transfer   Reason if not Attempted Safety concerns   CARE Score 88   Discharge Goal:  Assistance Needed Independent;Adaptive equipment   Discharge Goal:  Score 6   IRF ARTURO:  Walking   Does the Patient Walk? Yes   IRF ARTURO:  Walk 10 Feet   Assistance Needed Adaptive equipment;Physical assistance   Physical Assistance Level Less than 25%   CARE Score 3   Discharge Goal:  Assistance Needed Independent;Adaptive equipment   Discharge Goal:  Score 6   IRF-ARTURO:  Walk 50 Feet with Two Turns   Reason if not Attempted Medical concerns  (RA instability /weakness/ pain)   CARE Score 88   Discharge Goal:  Assistance Needed Independent;Adaptive equipment   Discharge Goal:  Score 6   IRF-ARTURO:  Walk 150 Feet    Reason if not Attempted Medical concerns  (RA instability /weakness/ pain)   CARE Score 88   Discharge Goal:  Assistance Needed Adaptive equipment;Supervision   Discharge Goal:  Score 4   IRF ARTURO:  Walking 10 Feet on Uneven Surfaces   Reason if not Attempted Medical concerns  (RA instability /weakness/ pain)   CARE Score 88   Discharge Goal:  Assistance Needed Adaptive equipment;Supervision   Discharge Goal:  Score 4   IRF ARTURO:  1 Step (Curb)   Reason if not Attempted Medical concerns  (RA instability /weakness/ pain)   CARE Score 88   Discharge Goal:  Assistance Needed Adaptive equipment;Supervision   Discharge Goal:  Score 4   IRF-ARTURO:  4 Steps   Reason if not Attempted Medical concerns  (RA instability /weakness/ pain)   CARE Score 88   Discharge Goal:  Assistance Needed Adaptive equipment;Supervision   Discharge Goal:  Score 4   IRF ARTURO:  12 Steps   Reason if not Attempted Activity not applicable  (RA instability /weakness/ pain/ 2 step access only to home)   CARE Score 9   Discharge Goal:  Assistance Needed Physical assistance   Discharge Goal:  Physical Assistance Level Total assistance   Discharge Goal:  Score 1   IRF ARTURO:  Picking Up Object   Reason if not Attempted Safety concerns   CARE Score 88   Discharge Goal:  Assistance Needed Adaptive equipment;Supervision   Discharge Goal:  Score 4   IRF-ARTURO:  Wheel 50 Feet with Two Turns   Indicate the Type of Wheelchair or Scooter Used Manual   Reason if not Attempted Medical concerns  (RA instability /weakness/ pain)   CARE Score 88   Discharge Goal:  Assistance Needed Independent   Discharge Goal:  Score 6   IRF-ARTURO:  Wheel 150 Feet   Indicate the Type of Wheelchair or Scooter Used Manual   Reason if not Attempted Medical concerns  (RA instability /weakness/ pain)   CARE Score 88   Discharge Goal:  Assistance Needed Incidental touching   Discharge Goal:  Score 4   Problem List    Problems Pain;Impaired Bed Mobility;Impaired Transfers;Impaired  Ambulation;Functional ROM Deficit;Functional Strength Deficit;Impaired Balance;Decreased Activity Tolerance   Precautions   Precautions Weight Bearing As Tolerated Right Lower Extremity;Weight Bearing As Tolerated Left Lower Extremity;Fall Risk   Comments R knee brace   Current Discharge Plan   Current Discharge Plan Return to Prior Living Situation   Interdisciplinary Plan of Care Collaboration   IDT Collaboration with  Certified O.T. Assistant  (DUBON);Occupational Therapist   Patient Position at End of Therapy In Bed;Call Light within Reach;Tray Table within Reach;Phone within Reach   Benefit   Therapy Benefit Patient Would Benefit from Inpatient Rehabilitation Physical Therapy to Maximize Functional [place] with ADLs, IADLs and Mobility.   PT Total Time Spent   PT Individual Total Time Spent (Mins) 60   PT Charge Group   Charges Yes   PT Therapeutic Activities 1   PT Evaluation PT Evaluation Mod       FIM Bed/Chair/Wheelchair Transfers Score: 3 - Moderate Assistance  Bed/Chair/Wheelchair Transfers Description:  Adaptive equipment, Increased time, Assist with two limbs(min A sit<>stand/ mod A sit>supine)    FIM Walking Score:  1 - Total Assistance  Walking Description:  (min A with R knee brace/ FWW x 25 ft)    FIM Wheelchair Score:  1 - Total Assistance  Wheelchair Description:  (min A to initiate forward momentum/ maintain steering x 15 ft. )    FIM Stairs Score:  0 - Not tested,unsafe activity  Stairs Description:       Assessment  Patient is 44 y.o. male with a diagnosis of L pubic rami fx/ pelvic hematoma/ abscess and osteomyelitis s/p surgical debridement.  Additional factors influencing patient status / progress : include GI bleed/ anemia, aortic root dilitation 2* stenosis, RA and severe degenerative R knee RA.      Plan  Recommend Physical Therapy  minutes per day 5-6 days per week for 2-3 weeks for the following treatments:  PT Group Therapy, PT Orthotics Training, PT Gait Training, PT Self  Care/Home Eval, PT Therapeutic Exercises, PT TENS Application, PT Neuro Re-Ed/Balance, PT Aquatic Therapy, PT Therapeutic Activity, PT Manual Therapy and PT Evaluation.    Goals:  Long term and short term goals have been discussed with patient and they are in agreement.    Physical Therapy Problems     Problem: Mobility     Dates: Start: 10/18/19       Description:     Goal: STG-Within one week, patient will propel wheelchair household distances     Dates: Start: 10/18/19       Description: 1) Individualized goal:  CGA to maintain forward momentum x 50 ft  2) Interventions:  PT Group Therapy, PT E Stim Attended, PT Gait Training, PT Self Care/Home Eval, PT Therapeutic Exercises, PT TENS Application, PT Neuro Re-Ed/Balance, PT Aquatic Therapy, PT Therapeutic Activity, PT Manual Therapy and PT Evaluation             Goal: STG-Within one week, patient will ambulate household distance     Dates: Start: 10/18/19       Description: 1) Individualized goal:  CGA with FWW and R knee brace x 50 ft  2) Interventions:  PT Group Therapy, PT E Stim Attended, PT Gait Training, PT Self Care/Home Eval, PT Therapeutic Exercises, PT TENS Application, PT Neuro Re-Ed/Balance, PT Aquatic Therapy, PT Therapeutic Activity, PT Manual Therapy and PT Evaluation                   Problem: Mobility Transfers     Dates: Start: 10/18/19       Description:     Goal: STG-Within one week, patient will perform bed mobility     Dates: Start: 10/18/19       Description: 1) Individualized goal:  Min A for body mechanics/ pain control   2) Interventions:  PT Group Therapy, PT E Stim Attended, PT Gait Training, PT Self Care/Home Eval, PT Therapeutic Exercises, PT TENS Application, PT Neuro Re-Ed/Balance, PT Aquatic Therapy, PT Therapeutic Activity, PT Manual Therapy and PT Evaluation           Goal: STG-Within one week, patient will sit to stand     Dates: Start: 10/18/19       Description: 1) Individualized goal:  SPV/ set-up with FWW  2) Interventions:   PT Group Therapy, PT E Stim Attended, PT Gait Training, PT Self Care/Home Eval, PT Therapeutic Exercises, PT TENS Application, PT Neuro Re-Ed/Balance, PT Aquatic Therapy, PT Therapeutic Activity, PT Manual Therapy and PT Evaluation                   Problem: PT-Long Term Goals     Dates: Start: 10/18/19       Description:     Goal: LTG-By discharge, patient will propel wheelchair     Dates: Start: 10/18/19       Description: 1) Individualized goal:  Modified independent for household mobility 50 ft x 2 to/from therapies  2) Interventions:  PT Group Therapy, PT E Stim Attended, PT Gait Training, PT Self Care/Home Eval, PT Therapeutic Exercises, PT TENS Application, PT Neuro Re-Ed/Balance, PT Aquatic Therapy, PT Therapeutic Activity, PT Manual Therapy and PT Evaluation             Goal: LTG-By discharge, patient will ambulate     Dates: Start: 10/18/19       Description: 1) Individualized goal:  Modified independent with FWW for household mobility 50 ft x 3  2) Interventions:  PT Group Therapy, PT E Stim Attended, PT Gait Training, PT Self Care/Home Eval, PT Therapeutic Exercises, PT TENS Application, PT Neuro Re-Ed/Balance, PT Aquatic Therapy, PT Therapeutic Activity, PT Manual Therapy and PT Evaluation             Goal: LTG-By discharge, patient will transfer one surface to another     Dates: Start: 10/18/19       Description: 1) Individualized goal:  Modified independent with FWW  2) Interventions:  PT Group Therapy, PT E Stim Attended, PT Gait Training, PT Self Care/Home Eval, PT Therapeutic Exercises, PT TENS Application, PT Neuro Re-Ed/Balance, PT Aquatic Therapy, PT Therapeutic Activity, PT Manual Therapy and PT Evaluation             Goal: LTG-By discharge, patient will ambulate up/down 4-6 stairs     Dates: Start: 10/18/19       Description: 1) Individualized goal:  SBA with hand rail vs SPC  2) Interventions:  PT Group Therapy, PT E Stim Attended, PT Gait Training, PT Self Care/Home Eval, PT Therapeutic  Exercises, PT TENS Application, PT Neuro Re-Ed/Balance, PT Aquatic Therapy, PT Therapeutic Activity, PT Manual Therapy and PT Evaluation             Goal: LTG-By discharge, patient will transfer in/out of a car     Dates: Start: 10/18/19       Description: 1) Individualized goal:  Modified independent with FWW  2) Interventions:  PT Group Therapy, PT E Stim Attended, PT Gait Training, PT Self Care/Home Eval, PT Therapeutic Exercises, PT TENS Application, PT Neuro Re-Ed/Balance, PT Aquatic Therapy, PT Therapeutic Activity, PT Manual Therapy and PT Evaluation

## 2019-10-18 NOTE — H&P
REHABILITATION HISTORY AND PHYSICAL/POST ADMISSION PHYSICAL EVALUATION    Date of Admission: 10/17/2019  5:15 PM  Richard Hubbard II  [id number]    [facility] Code / Diagnosis to Support: 08.3 Pelvic Fracture  Etiologic Diagnosis: Pubic ramus fracture (HCC)    CC: hip pain, thigh abscess    HPI:  Adapted from Dr. Khan's consult note on 10/15/19:  Patient is a 44 y.o. male  with a past medical history of aortic root dilation and moderate aortic stenosis, PUD, rheumatoid arthritis on Humira and chronically immunosuppressed, admitted to [facility] on 9/13, with anemia and pelvic pain. He was scheduled for a total knee replacement on the right prior to this admission. He had CT here which showed left pubic ramus fracture and two large hematomas. 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 MRI spine with mild chronic compression fracture of T11 and paraspinal soft tissue contusions on the lumbar spine. He is WBAT for his pelvic fracture, managed non-operatively.  Patient found to have multiple fluid collections on imaging which were concerning for an abscess, and underwent CT-guided drainage on 10/2 with cultures growing MSSA.  Patient underwent surgical debridement on 10/9, pathology consistent with osteomyelitis, on IV antibiotics with stop date of 11/19/2019. Patient has since been switched to IV Ancef q8h on 10/12/19. He also reports systemic fungal infection over his chest as well as his groin.  Per ID recommending weekly CBC and CMPs while on antibiotics.     Patient tolerated transfer over. He reports it was a rough day as they decreased his long acting morphine and some of the timings of the PRN medications have been off. He reports his left hip and right knee are his primary complaints. He also complaints of fungal rash around groin up to bottom of chest and also on bilateral feet. He reports it was initially very erythematous but has improved and now has lots of  flaking dead skin.  Otherwise denies SOB. Denies NVD. Reports occasional constipation.      REVIEW OF SYSTEMS:     Comprehensive 14 point ROS was reviewed and all were negative except as noted elsewhere in this document.     PMH:  Past Medical History:   Diagnosis Date   • ADD (attention deficit disorder)    • Alcohol abuse    • Allergic rhinitis 5/21/2009   • Anemia 09/2019   • Anxiety 5/21/2009   • Back pain 5/21/2009   • Bipolar disorder (HCC)    • Bleeding ulcer 5/21/2009   • Depression 5/21/2009   • Eczema 5/21/2009   • History of bleeding ulcers 2/12/2015   • Hypertension    • Insomnia 5/21/2009   • Migraine 5/21/2009   • Obesity, morbid (HCC) 5/21/2009   • Pain 09/2019    Chronic knee and back pain   • Rheumatoid arteritis (HCC) 09/2019    knee, feet   • Sleep apnea 5/21/2009    Did not tolerate cpap, does not use it   • Snoring        PSH:  Past Surgical History:   Procedure Laterality Date   • IRRIGATION & DEBRIDEMENT ORTHO Left 10/9/2019    Procedure: IRRIGATION AND DEBRIDEMENT, WOUND - PELVIC OSTEOMYELITIS;  Surgeon: You Olivares M.D.;  Location: SURGERY [facility];  Service: Orthopedics   • GASTRIC BYPASS LAPAROSCOPIC  2000    Lucila en y   • CHOLECYSTECTOMY  2000       FAMILY HISTORY:  Family History   Problem Relation Age of Onset   • Hypertension Mother    • Arthritis Mother    • Depression Mother    • Cancer Father         bladder   • Schizophrenia Father    • Cancer Maternal Grandmother         breast   • Heart Disease Maternal Grandmother    • Psychiatric Illness Other    • Stroke Maternal Aunt    • Other Neg Hx         no connective tissue disorders or known aortic disease       MEDICATIONS:  Current Facility-Administered Medications   Medication Dose   • Respiratory Care per Protocol     • Pharmacy Consult Request ...Pain Management Review 1 Each  1 Each   • oxyCODONE immediate-release (ROXICODONE) tablet 5 mg  5 mg   • oxyCODONE immediate release (ROXICODONE) tablet 10 mg  10 mg   •  tramadol (ULTRAM) 50 MG tablet 50 mg  50 mg   • hydrALAZINE (APRESOLINE) tablet 25 mg  25 mg   • acetaminophen (TYLENOL) tablet 650 mg  650 mg   • senna-docusate (PERICOLACE or SENOKOT S) 8.6-50 MG per tablet 2 Tab  2 Tab    And   • polyethylene glycol/lytes (MIRALAX) PACKET 1 Packet  1 Packet    And   • magnesium hydroxide (MILK OF MAGNESIA) suspension 30 mL  30 mL    And   • bisacodyl (DULCOLAX) suppository 10 mg  10 mg   • artificial tears ophthalmic solution 1 Drop  1 Drop   • benzocaine-menthol (CEPACOL) lozenge 1 Lozenge  1 Lozenge   • mag hydrox-al hydrox-simeth (MAALOX PLUS ES or MYLANTA DS) suspension 20 mL  20 mL   • ondansetron (ZOFRAN ODT) dispertab 4 mg  4 mg    Or   • ondansetron (ZOFRAN) syringe/vial injection 4 mg  4 mg   • traZODone (DESYREL) tablet 50 mg  50 mg   • sodium chloride (OCEAN) 0.65 % nasal spray 2 Spray  2 Spray   • [START ON 10/18/2019] nicotine (NICODERM) 14 MG/24HR 14 mg  14 mg    And   • nicotine polacrilex (NICORETTE) 2 MG piece 2 mg  2 mg   • acetaminophen (TYLENOL) tablet 1,000 mg  1,000 mg   • [START ON 10/18/2019] calcium citrate (CALCITRATE) tablet 950 mg  950 mg   • ceFAZolin (ANCEF) 2 g in  mL IVPB  2 g   • [START ON 10/18/2019] enoxaparin (LOVENOX) inj 40 mg  40 mg   • [START ON 10/18/2019] ferrous sulfate tablet 325 mg  325 mg   • furosemide (LASIX) tablet 20 mg  20 mg   • [START ON 10/18/2019] losartan (COZAAR) tablet 25 mg  25 mg   • morphine ER (MS CONTIN) tablet 15 mg  15 mg   • omeprazole (PRILOSEC) capsule 40 mg  40 mg   • [START ON 10/18/2019] predniSONE (DELTASONE) tablet 40 mg  40 mg   • pregabalin (LYRICA) capsule 25 mg  25 mg   • simethicone (MYLICON) chewable tab 80 mg  80 mg   • [START ON 10/18/2019] vitamin D (cholecalciferol) tablet 1,000 Units  1,000 Units   • clotrimazole-betamethasone (LOTRISONE) 1-0.05 % cream     • Influenza Vaccine Quad pf injection 0.5 mL  0.5 mL       ALLERGIES:  Benadryl [altaryl]; Nsaids; Phenergan [promethazine hcl]; and  Penicillins    PSYCHOSOCIAL HISTORY:  Housing / Facility: 1 Story House  Steps Into Home: 2  Steps In Home: 0  Lives with - Patient's Self Care Capacity: Spouse  Equipment Owned: Front-Wheel Walker, Hand Held Shower     Prior Level of Function / Living Situation:   Physical Therapy: Prior Services: None  Housing / Facility: 1 Story House  Steps Into Home: 2  Steps In Home: 0  Rail: None  Bathroom Set up: Walk In Shower(has access to tub shower combo with HHS if needed)  Equipment Owned: Front-Wheel Walker, Hand Held Shower  Lives with - Patient's Self Care Capacity: Spouse  Bed Mobility: Independent  Transfer Status: Independent  Ambulation: Independent  Distance Ambulation (Feet): (limited community)  Assistive Devices Used: Front-Wheel Walker  Current Level of Function:   Level Of Assist: Refused(just returned from taking a shower and is too fatigued)  Assistive Device: Front Wheel Walker  Distance (Feet): 20  Deviation: Antalgic, Step To, Shuffled Gait  # of Stairs Climbed: 0  Weight Bearing Status: WBAT B LE  Skilled Intervention: Verbal Cuing, Postural Facilitation, Facilitation  Comments: shuffling steps, limitedb y pain R knee> L hip/groin  Supine to Sit: Minimal Assist  Sit to Supine: Moderate Assist  Scooting: Supervised  Skilled Intervention: Verbal Cuing  Comments: HOB up to approx 60 degrees prior to supine to sit  Sit to Stand: Minimal Assist(close guarding for safety)  Bed, Chair, Wheelchair Transfer: Refused  Transfer Method: Stand Pivot  Skilled Intervention: Verbal Cuing, Postural Facilitation  Comments: Fatigued today, overall appears to have declined endurance  Sitting in Chair: up in chair at end of session  Sitting Edge of Bed: 8 min  Standing: mod SOB with activity, fatgiued due to effort to ambulate related to pain  Occupational Therapy:   Self Feeding: Independent  Grooming / Hygiene: Independent  Bathing: Independent  Dressing: Independent  Toileting: Independent  Medication Management:  "Independent  Laundry: Requires Assist  Kitchen Mobility: Requires Assist  Finances: Requires Assist  Home Management: Requires Assist  Shopping: Requires Assist  Prior Level Of Mobility: Independent With Device in Home(minimal- to BR only, used borrowed w/c to exit home)  Driving / Transportation: Relatives / Others Provide Transportation  Prior Services: None  Housing / Facility: [address]  Occupation (Pre-Hospital Vocational): Other (Comments)(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)  Leisure Interests: Unable To Determine At This Time  Current Level of Function:   Eating: Not Tested  Bathing: Not Tested  Upper Body Dressing: Minimal Assist  Lower Body Dressing: Maximal Assist  Toileting: Not Tested  Skilled Intervention: Verbal Cuing, Compensatory Strategies  Comments: pt leaning on BUE at sink during grooming for support  Speech Language Pathology:      Rehabilitation Prognosis/Potential: Good  Estimated Length of Stay: 14- 17 days     CURRENT LEVEL OF FUNCTION:   Same as level of function prior to admission to [facility]    PHYSICAL EXAM:     VITAL SIGNS:   height is 1.803 m (5' 11\") and weight is 91.6 kg (201 lb 15.1 oz). His temporal temperature is 36.7 °C (98.1 °F). His blood pressure is 104/74 and his pulse is 87. His respiration is 17 and oxygen saturation is 91%.     GENERAL: No apparent distress  HEENT: Normocephalic/atraumatic, EOMI and PERRL  CARDIAC: Regular rate and rhythm, normal S1, S2   LUNGS: Clear to auscultation   ABDOMINAL: bowel sounds present, soft and nontender    EXTREMITIES: no contractures, spasticity, or edema.  Scaly papular rash with flaking dry skin from groin up to upper abdomen as well as on bilateral feet.     NEURO:  Mental status:  A&Ox4 (person, place, date, situation) answers questions appropriately  Speech: fluent, no aphasia or dysarthria  Motor:   5/5 BUE  Limited by r knee and left pelvis on transfer  Moving bilateral LE at least " antigravity at bed level   Sensory: intact to light touch through out  DTRs: 2+ in bilateral biceps    RADIOLOGY:                                                                Results for orders placed during the hospital encounter of 09/27/19   CT-ABDOMEN-PELVIS WITH    Impression 1.  Interval decrease in the intramuscular abscesses involving the left pelvis and thigh following drain placement. Collection in the pubococcygeus muscle is similar in size to the prior exam.    2.  Ventral hernia containing anterior bowel wall and fat. No secondary obstruction.                 Results for orders placed during the hospital encounter of 09/27/19   CT-PELVIS WITH    Impression 1.  Recent but probably subacute fracture of the left superior pubic ramus near the symphysis. Adjacent and just lateral to this are two complex masslike structures, the largest and most medial of which contains a fluid-filled cavity. These are   consistent with hematomata, possibly intramuscular.                LABS:  Recent Labs     10/15/19  0927   SODIUM 139   POTASSIUM 4.4   CHLORIDE 102   CO2 26   GLUCOSE 89   BUN 9   CREATININE 0.55   CALCIUM 8.9     Recent Labs     10/15/19  0958   WBC 5.3   RBC 3.31*   HEMOGLOBIN 8.8*   HEMATOCRIT 29.0*   MCV 87.6   MCH 26.6*   MCHC 30.3*   RDW 62.2*   PLATELETCT 339   MPV 8.6*             PRIMARY REHAB DIAGNOSIS:    This patient is a 44 y.o. male admitted for acute inpatient rehabilitation with Pubic ramus fracture (HCC).    IMPAIRMENTS:   ADLs/IADLs  Mobility    SECONDARY DIAGNOSIS/MEDICAL CO-MORBIDITIES AFFECTING FUNCTION:  RA  MONICA  Fungal infection  Left thigh abscess  HTN    RELEVANT CHANGES SINCE PREADMISSION EVALUATION:    Status unchanged    The patient's rehabilitation potential is Very Good  The patient's medical prognosis is good    PLAN:   Discussion and Recommendations:   1. The patient requires an acute inpatient rehabilitation program with a coordinated program of care at an intensity and  frequency not available at a lower level of care. This recommendation is substantiated by the patient's medical physicians who recommend that the patient's intervention and assessment of medical issues needs to be done at an acute level of care for patient's safety and maximum outcome.   2. A coordinated program of care will be supplied by an interdisciplinary team of physical therapy, occupational therapy, rehab physician, rehab nursing, and, if needed, speech therapy and rehab psychology. Rehab team presents a patient-specific rehabilitation and education program concentrating on prevention of future problems related to accessibility, mobility, skin, bowel, bladder, sexuality, and psychosocial and medical/surgical problems.   3. Need for Rehabilitation Physician: The rehab physician will be evaluating the patient on a multi-weekly basis to help coordinate the program of care. The rehab physician communicates between medical physicians, therapists, and nurses to maximize the patient's potential outcome. Specific areas in which the rehab physician will be providing daily assessment include the following:   A. Assessing the patient's heart rate and blood pressure response (vitals monitoring) to activity and making adjustments in medications or conservative measures as needed.   B. The rehab physician will be assessing the frequency at which the program can be increased to allow the patient to reach optimal functional outcome.   C. The rehab physician will also provide assessments in daily skin care, especially in light of patient's impairments in mobility.   D. The rehab physician will provide special expertise in understanding how to work with functional impairment and recommend appropriate interventions, compensatory techniques, and education that will facilitate the patient's outcome.   4. Rehab R.N.   The rehab RN will be working with patient to carry over in room mobility and activities of daily living when the  patient is not in 3 hours of skilled therapy. Rehab nursing will be working in conjunction with rehab physician to address all the medical issues above and continue to assess laboratory work and discuss abnormalities with the treating physicians, assess vitals, and response to activity, and discuss and report abnormalities with the rehab physician. Rehab RN will also continue daily skin care, supervise bladder/bowel program, instruct in medication administration, and ensure patient safety.   5. Rehab Therapy: Therapies to treat at intensity and frequency of (may change after completion of evaluation by all therapeutic disciplines):       PT:  Physical therapy to address mobility, transfer, gait training and evaluation for adaptive equipment needs 1 and 1/2 hour/day at least 5 days/week for the duration of the ELOS (see below)       OT:  Occupational therapy to address ADLs, self-care, home management training, functional mobility/transfers and assistive device evaluation, and community re-integration 1 and 1/2 hour/day at least 5 days/week for the duration of the ELOS (see below).   Medical management / Rehabilitation Issues/ Adverse Potential as part of rehabilitation plan     REHABILITATION ISSUES/ADVERSE POTENTIAL:  1. Left pelvic fracture - managed conservatively, found to have abscess in thigh status post drainage.  Patient demonstrates functional deficits in strength, balance, coordination, and ADL's. Patient is admitted to [facility] for comprehensive rehabilitation therapy as described below.   Rehabilitation nursing monitors bowel and bladder control, educates on medication administration, co-morbidities and monitors patient safety.    2.  Neurostimulants: None at this time but continue to assess daily for need to initiate should status change.    3.  DVT prophylaxis:  Patient is on Lovenox for anticoagulation upon transfer. Encourage OOB. Monitor daily for signs and symptoms of DVT  including but not limited to swelling and pain to prevent the development of DVT that may interfere with therapies.    4.  GI prophylaxis:  On prilosec to prevent gastritis/dyspepsia which may interfere with therapies.    5.  Pain: No issues with pain currently / Controlled with APAP/Oxycodone/Morphine    6.  Nutrition/Dysphagia: Dietician monitors nutrient intake, recommend supplements prn and provide nutrition education to pt/family to promote optimal nutrition for wound healing/recovery.     7.  Bladder/bowel:  Start bowel and bladder program as described below, to prevent constipation, urinary retention (which may lead to UTI), and urinary incontinence (which will impact upon pt's functional independence).   - Post void bladder scans, I&O cath for PVRs >400  - up to commode after meal     8.  Skin/dermal ulcer prophylaxis: Monitor for new skin conditions with q.2 h. turns as required to prevent the development of skin breakdown.     9.  Cognition/Behavior:  Psychologist Dr. Rich provides adjustment counseling to illness and psychosocial barriers that may be potential barriers to rehabilitation.     10. Respiratory therapy: RT performs O2 management prn, breathing retraining, pulmonary hygiene and bronchospasm management prn to optimize participation in therapies.     MEDICAL CO-MORBIDITIES/ADVERSE POTENTIAL AFFECTING FUNCTION:  Left pelvic fracture - managed conservatively, found to have abscess in thigh status post drainage.   -PT and OT for mobility and ADLs  -Follow-up with Orthopedics    Pain - Patient with left hip and right knee. Was supposed to get right knee replacement prior to falls and hospitalization.  -On Morphine 15 mg BID, Lyrica 25 mg TID, and scheduled Tylenol. With PRN Oxycodone.     Left thigh mass/osteomyelitis - s/p drainage with MSSA growing from cultures.    -Continue Ancef 2 g q8h until 11/22/19.  Recommending weekly CBC and CMP    Fungal rash - Patient with diffuse fungal rash.  Patient s/p fluconazole for 10 days. Per pharmacy no miconazole cream currently. Will switch to clotrimazole cream BID    HTN/AS - Patient on Losartan 25 mg daily, Lasix 20 mg BID     Anemia - Patient on Fe supplement on transfer. Check AM CBC    RA - Patient previously on DMARD, now on hold while has active osteomyelitis. On Predinsone 40 mg daily    Nicotine dependence - On patch on transfer. Will need teaching.     GERD - History of PUD. Continue Omeprazole.    Vitamin D deficiency - 1000 U on transfer. Check level    DVT ppx - Patient on Lovenox on transfer.     I personally performed a complete drug regimen review and no potential clinically significant medication issues were identified.     Pt was seen today for 80 min, and entire time spent in face-to-face contact was >50% in counseling and coordination of care as detailed in A/P above.        GOALS/EXPECTED LEVEL OF FUNCTION BASED ON CURRENT MEDICAL AND FUNCTIONAL STATUS (may change based on patient's medical status and rate of impairment recovery):  Transfers:   Modified Independent  Mobility/Gait:   Modified Independent  ADL's:   Modified Independent    DISPOSITION: Discharge to pre-morbid independent living setting with the supportive care of patient's spouse.    ELOS: 14 - 17 days

## 2019-10-18 NOTE — THERAPY
Occupational Therapy   Initial Evaluation     Patient Name: Richard Hubbard II  Age:  44 y.o., Sex:  male  Medical Record #: [id number]  Today's Date: 10/18/2019     Subjective    Patient in bed with lights off upon OT arrival to room.  Patient anxious, but agreeable to OT evaluation.     Objective       10/18/19 0701   Prior Living Situation   Prior Services Home-Independent   Housing / Facility 1 Story House   Steps Into Home 2   Steps In Home 0   Rail None   Elevator No   Bathroom Set up Walk In Shower   Equipment Owned Front-Wheel Walker;Single Point Cane   Lives with - Patient's Self Care Capacity Spouse;Child Less than 18 Years of Age  (17 month old and 4 year old)   Comments Patient stays home and takes care of the kids; wife works full time as a nurse practitioner   Prior Level of ADL Function   Self Feeding Independent   Grooming / Hygiene Independent   Bathing Independent   Dressing Independent   Toileting Independent   Prior Level of IADL Function   Medication Management Independent   Laundry Independent   Kitchen Mobility Independent   Finances Independent   Home Management Independent   Shopping Independent   Prior Level Of Mobility Independent With Device in Community   Driving / Transportation Driving Independent   Occupation (Pre-Hospital Vocational) Homemaker   Leisure Interests Hobbies;Movies / Theatre  (enjoys woodworking, iron/metal working, going to movies)   Comments doesn't have much time for leisure interests due to taking care of kids most of the time   IRF-ARTURO:  Prior Functioning: Everyday Activities   Self Care Independent   Indoor Mobility (Ambulation) Independent   Stairs Independent   Functional Cognition Independent   Prior Device Use Walker   Vitals   O2 Delivery None (Room Air)   Pain 0 - 10 Group   Location Pelvis   Location Orientation Left   Therapist Pain Assessment 6;Prior to Activity   Cognition    Cognition / Consciousness WDL   Orientation Level Oriented x 4   Level of  "Consciousness Alert   Comments pt self reports impaired memory due to ADHD   IRF-ARTURO:  Cognitive Pattern Assessment   Cognitive Pattern Assessment Used BIMS   IRF-ARTURO:  Brief Interview for Mental Status (BIMS)   Repetition of Three Words (First Attempt) 3   Temporal Orientation: Able to Report the Correct Year Correct   Temporal Orientation: Able to Report the Correct Month Accurate within 5 days   Temporal Orientation: Able to Report the Correct Day of the Week Correct   Able to Recall \"Sock\" Yes, after cueing (\"something to wear\")   Able to Recall \"Blue\" Yes, no cue required   Able to Recall \"Bed\" No, could not recall   BIMS Summary Score 12   Vision Screen   Vision Not tested   Passive ROM Upper Body   Passive ROM Upper Body WDL   Active ROM Upper Body   Active ROM Upper Body  WDL   Strength Upper Body   Upper Body Strength  X   Comments Generally 4-/5 throughout B UE muscle groups   Sensation Upper Body   Upper Extremity Sensation  Not Tested   Comments though pt self reports numbness/tingling in left hand due to R.A. impacting nerves   Upper Body Muscle Tone   Upper Body Muscle Tone  Not Tested   Balance Assessment   Sitting Balance (Static) Fair +   Sitting Balance (Dynamic) Fair +   Standing Balance (Static) Poor +  (with no UE support)   Standing Balance (Dynamic) Trace +  (with no UE support)   Weight Shift Sitting Fair   Weight Shift Standing Fair   Bed Mobility    Supine to Sit Stand by Assist   Sit to Supine Moderate Assist  (LEs)   Sit to Stand Minimal Assist  (from elevated bed)   Scooting Stand by Assist   Coordination Upper Body   Coordination X   Fine Motor Coordination mildly impaired left UE due to RA    IRF-ARTURO:  Eating   Assistance Needed Independent   CARE Score 6   Discharge Goal:  Assistance Needed Independent   Discharge Goal:  Score 6   IRF-ARTURO:  Oral Hygiene   Assistance Needed Set-up / clean-up   Physical Assistance Level No physical assistance   CARE Score 5   Discharge Goal:  Assistance " Needed Independent   Discharge Goal:  Physical Assistance Level No physical assistance or only touching/steadying assist   Discharge Goal:  Score 6   IRF-ARTURO:  Shower/Bathe Self   Reason if not Attempted Refused to perform  (showered yesterday)   CARE Score 7   Discharge Goal:  Assistance Needed Independent;Adaptive equipment   Discharge Goal:  Physical Assistance Level No physical assistance or only touching/steadying assist   Discharge Goal Score 6   IRF-ARTURO:  Upper Body Dressing   Assistance Needed Set-up / clean-up   Physical Assistance Level No physical assistance or only touching/steadying assist   CARE Score 5   Discharge Goal:  Assistance Needed Independent   Discharge Goal:  Physical Assistance Level No physical assistance or only touching/steadying assist   Dischage Goal:  Score 6   IRF-ARTURO:  Lower Body Dressing   Assistance Needed Physical assistance;Verbal cues;Supervision;Set-up / clean-up   Physical Assistance Level 50%-74%   CARE Score 2   Discharge Goal:  Assistance Needed Independent;Adaptive equipment   Discharge Goal:  Physical Assistance Level No physical assistance or only touching/steadying assist   Discharge Goal:  Score 6   IRF ARTURO:  Putting On/Taking Off Footwear   Assistance Needed Physical assistance   Physical Assistance Level Total assistance   CARE Score 1   Discharge Goal:  Assistance Needed Independent;Adaptive equipment   Discharge Goal:  Physical Assistance Level No physical assistance or only touching/steadying assist   Discharge Goal:  Score 6   IRF-ARTURO:  Toileting Hygiene   Assistance Needed Physical assistance;Supervision;Set-up / clean-up;Verbal cues   Physical Assistance Level 75% or more   CARE Score 2   Discharge Goal:  Assistance Needed Independent;Adaptive equipment   Discharge Goal:  Physical Assistance Level No physical assistance or only touching/steadying assist   Discharge Goal:  Score 6   IRF-ARTURO:  Toilet Transfer   Assistance Needed Physical assistance;Verbal  cues;Supervision;Set-up / clean-up   Physical Assistance Level 75% or more   CARE Score 2   Discharge Goal:  Assistance Needed Independent;Adaptive equipment   Discharge Goal:  Physical Assistance Level No physical assistance or only touching/steadying assist   Discahrge Goal:  Score 6   IRF-ARTURO:  Hearing, Speech, and Vision   Expression of Ideas and Wants 4   Understanding Verbal and Non-Verbal Content 4   Problem List   Problem List Decreased Active Daily Living Skills;Decreased Homemaking Skills;Decreased Upper Extremity Strength Right;Decreased Upper Extremity Strength Left;Decreased Functional Mobility;Decreased Activity Tolerance;Impaired Coordination Left Upper Extremity;Impaired Sensation Left Upper Extremity;Impaired Postural Control / Balance   Precautions   Precautions Weight Bearing As Tolerated Left Lower Extremity;Fall Risk   Comments has a L knee brace to walk with   Current Discharge Plan   Current Discharge Plan Return to Prior Living Situation   Benefit    Therapy Benefit Patient Would Benefit from Inpatient Rehab Occupational Therapy to Maximize [place] with ADLs, IADLs and Functional Mobility.   Interdisciplinary Plan of Care Collaboration   IDT Collaboration with  Physical Therapist   Patient Position at End of Therapy In Bed;Call Light within Reach;Tray Table within Reach;Phone within Reach   Collaboration Comments Patient in bed and eating breakfast prior to PT eval   Equipment Needs   Assistive Device / DME Parallel Bars;Front-Wheel Walker;Wheelchair;Shower Chair;Raised Toilet Seat;Grab Bars In Shower / Tub;Grab Bars By Toilet   Adaptive Equipment Reacher;Sock Aide;Dressing Stick;Long Handled Sponge;Elastic Shoe Laces   OT Total Time Spent   OT Individual Total Time Spent (Mins) 60   OT Charge Group   Charges Yes   OT Self Care / ADL 1   OT Evaluation OT Evaluation Mod       FIM Eating Score:  7 - Independent  Eating Description:       FIM Grooming Score:  5 - Standby  Prompting/Supervision or Set-up  Grooming Description:  Set-up of equipment(setup seated at sink to brush teeth, wash/dry hands and face)    FIM Bathing Score:  0 - Not tested, patient refused  Bathing Description:       FIM Upper Body Dressin - Standby Prompting/Supervision or Set-up  Upper Body Dressing Description:  Set-up of equipment(setup to don two pullover shirts)    FIM Lower Body Dressing Score:  2 - Max Assistance  Lower Body Dressing Description:  Supervision for safety, Verbal cueing, Initial preparation for task(assist w/ 8/12 tasks to don underwear and pants, socks and shoes)    FIM Toileting Body Dressin - Max Assistance  Toileting Description:  Supervision for safety, Verbal cueing, Set-up of equipment, Initial preparation for task, Grab bar(assist w/ clothing mgmt)    FIM Bed/Chair/Wheelchair Transfers Score:    Bed/Chair/Wheelchair Transfers Description:       FIM Toilet Transfer Score:  2 - Max Assistance  Toilet Transfer Description:  Supervision for safety, Set-up of equipment, Initial preparation for task, Requires lift(lift and lowering assist on/off regular toilet)    FIM Tub/Shower Transfers Score:  0 - Not tested, patient refused  Tub/Shower Transfers Description:       FIM Comprehension Score:  7 - Independent  Comprehension Description:       FIM Expression Score:  7 - Independent  Expression Description:       FIM Social Interaction Score:  7 - Independent  Social Interaction Description:       FIM Problem Solving Score:  7 - Independent  Problem Solving Description:       Assessment  Patient is 44 y.o. male with a diagnosis of L pubic ramus fx (nonsurgical), two large hematomas w/ abscess s/p drainage, which grew MSSA with osteomyelitis.  Additional factors influencing patient status / progress (ie: cognitive factors, co-morbidities, social support, etc): hx of R.A., lives with spouse and two young children.      Plan  Recommend Occupational Therapy  minutes per day 5-7  days per week for 14-17 days for the following treatments:  OT Group Therapy, OT Self Care/ADL, OT Community Reintegration, OT Manual Ther Technique, OT Neuro Re-Ed/Balance, OT Sensory Int Techniques, OT Therapeutic Activity, OT Evaluation and OT Therapeutic Exercise.    Goals:  Long term and short term goals have been discussed with patient and they are in agreement.    Occupational Therapy Goals     Problem: Bathing     Dates: Start: 10/18/19       Description:     Goal: STG-Within one week, patient will bathe     Dates: Start: 10/18/19       Description: 1) Individualized Goal:  Body with min A using AE/AD/techniques as needed  2) Interventions:  OT Group Therapy, OT Self Care/ADL, OT Community Reintegration, OT Manual Ther Technique, OT Neuro Re-Ed/Balance, OT Sensory Int Techniques, OT Therapeutic Activity, OT Evaluation and OT Therapeutic Exercise                   Problem: Dressing     Dates: Start: 10/18/19       Description:     Goal: STG-Within one week, patient will dress LB     Dates: Start: 10/18/19       Description: 1) Individualized Goal:  With min A using AE/AD/techniques as needed  2) Interventions:  OT Group Therapy, OT Self Care/ADL, OT Community Reintegration, OT Manual Ther Technique, OT Neuro Re-Ed/Balance, OT Sensory Int Techniques, OT Therapeutic Activity, OT Evaluation and OT Therapeutic Exercise                   Problem: Functional Transfers     Dates: Start: 10/18/19       Description:     Goal: STG-Within one week, patient will transfer to toilet     Dates: Start: 10/18/19       Description: 1) Individualized Goal:  With CGA using commode over toilet, grab bar and FWW as needed  2) Interventions:  OT Group Therapy, OT Self Care/ADL, OT Community Reintegration, OT Manual Ther Technique, OT Neuro Re-Ed/Balance, OT Sensory Int Techniques, OT Therapeutic Activity, OT Evaluation and OT Therapeutic Exercise                   Problem: OT Long Term Goals     Dates: Start: 10/18/19        Description:     Goal: LTG-By discharge, patient will complete basic self care tasks     Dates: Start: 10/18/19       Description: 1) Individualized Goal:  With mod I using AE/AD/techniques as needed.  2) Interventions:  OT Group Therapy, OT Self Care/ADL, OT Community Reintegration, OT Manual Ther Technique, OT Neuro Re-Ed/Balance, OT Sensory Int Techniques, OT Therapeutic Activity, OT Evaluation and OT Therapeutic Exercise             Goal: LTG-By discharge, patient will perform bathroom transfers     Dates: Start: 10/18/19       Description: 1) Individualized Goal:  Mod I using AE/AD/techniques as needed  2) Interventions:  OT Group Therapy, OT Self Care/ADL, OT Community Reintegration, OT Manual Ther Technique, OT Neuro Re-Ed/Balance, OT Sensory Int Techniques, OT Therapeutic Activity, OT Evaluation and OT Therapeutic Exercise             Goal: LTG-By discharge, patient will complete basic home management     Dates: Start: 10/18/19       Description: 1) Individualized Goal:  (meal prep, laundry, kitchen cleanup) mod I using AE/AD/techniques as needed.  2) Interventions:  OT Group Therapy, OT Self Care/ADL, OT Community Reintegration, OT Manual Ther Technique, OT Neuro Re-Ed/Balance, OT Sensory Int Techniques, OT Therapeutic Activity, OT Evaluation and OT Therapeutic Exercise                   Problem: Toileting     Dates: Start: 10/18/19       Description:     Goal: STG-Within one week, patient will complete toileting tasks     Dates: Start: 10/18/19       Description: 1) Individualized Goal:  With mod A using grab bar, commode over toilet and FWW as needed.  2) Interventions:  OT Group Therapy, OT Self Care/ADL, OT Community Reintegration, OT Manual Ther Technique, OT Neuro Re-Ed/Balance, OT Sensory Int Techniques, OT Therapeutic Activity, OT Evaluation and OT Therapeutic Exercise

## 2019-10-18 NOTE — CARE PLAN
Medicated with Oxycodone 10 per prn order and patient request.  Patient complains of pelvic pain and left groin pain.

## 2019-10-18 NOTE — THERAPY
"Occupational Therapy  Daily Treatment     Patient Name: Richard Hbubard II  Age:  44 y.o., Sex:  male  Medical Record #: [id number]  Today's Date: 10/18/2019     Precautions  Precautions: Weight Bearing As Tolerated Right Lower Extremity, Weight Bearing As Tolerated Left Lower Extremity, Fall Risk  Comments: R knee brace         Subjective    \" The therapist at [facility] went over some of these with me .\"     Objective     10/18/19 1331   Precautions   Precautions Weight Bearing As Tolerated Right Lower Extremity;Weight Bearing As Tolerated Left Lower Extremity;Fall Risk   Comments R knee brace   Interdisciplinary Plan of Care Collaboration   Patient Position at End of Therapy In Bed;Call Light within Reach;Tray Table within Reach   OT Total Time Spent   OT Individual Total Time Spent (Mins) 30   OT Charge Group   OT Self Care / ADL 2      supine to sit  Supervision   Sit to supine  Mod assist ( both LEs back up into bed )   issued  Loaner  Reacher  , sock aid ( with built up handles)  Dressing stick/ shoe horn combo and long sponge.     Patient doffed/ donned pants over feet and up to knees using dressing stick  Rather than reacher to reduce joint stress in fingers.   Doff /don socks with sock aid and dressing stick with supervision .          Assessment     participates to best of his ability  Motivated to maximize  functional  independence.     Plan     ADL , IADL , related mobility strength/endurance building  AE  Training/ use  ,  Joint protection and energy conservation education       "

## 2019-10-19 PROCEDURE — 700111 HCHG RX REV CODE 636 W/ 250 OVERRIDE (IP): Performed by: PHYSICAL MEDICINE & REHABILITATION

## 2019-10-19 PROCEDURE — 97535 SELF CARE MNGMENT TRAINING: CPT

## 2019-10-19 PROCEDURE — 700101 HCHG RX REV CODE 250: Performed by: PHYSICAL MEDICINE & REHABILITATION

## 2019-10-19 PROCEDURE — 700105 HCHG RX REV CODE 258: Performed by: PHYSICAL MEDICINE & REHABILITATION

## 2019-10-19 PROCEDURE — 700102 HCHG RX REV CODE 250 W/ 637 OVERRIDE(OP): Performed by: PHYSICAL MEDICINE & REHABILITATION

## 2019-10-19 PROCEDURE — 770010 HCHG ROOM/CARE - REHAB SEMI PRIVAT*

## 2019-10-19 PROCEDURE — A9270 NON-COVERED ITEM OR SERVICE: HCPCS | Performed by: PHYSICAL MEDICINE & REHABILITATION

## 2019-10-19 RX ADMIN — OXYCODONE HYDROCHLORIDE 10 MG: 10 TABLET ORAL at 21:50

## 2019-10-19 RX ADMIN — CALCIUM CITRATE 200 MG (950 MG) TABLET 950 MG: at 09:43

## 2019-10-19 RX ADMIN — OXYCODONE HYDROCHLORIDE 10 MG: 10 TABLET ORAL at 09:47

## 2019-10-19 RX ADMIN — CEFAZOLIN 2 G: 10 INJECTION, POWDER, FOR SOLUTION INTRAVENOUS at 14:28

## 2019-10-19 RX ADMIN — CLOTRIMAZOLE AND BETAMETHASONE DIPROPIONATE 1 DOSE: 10; .5 CREAM TOPICAL at 22:16

## 2019-10-19 RX ADMIN — MORPHINE SULFATE 15 MG: 15 TABLET, FILM COATED, EXTENDED RELEASE ORAL at 21:47

## 2019-10-19 RX ADMIN — Medication 20 ML: at 22:18

## 2019-10-19 RX ADMIN — PREGABALIN 75 MG: 75 CAPSULE ORAL at 09:43

## 2019-10-19 RX ADMIN — ACETAMINOPHEN 1000 MG: 500 TABLET, FILM COATED ORAL at 21:46

## 2019-10-19 RX ADMIN — PREGABALIN 75 MG: 75 CAPSULE ORAL at 15:37

## 2019-10-19 RX ADMIN — OMEPRAZOLE 40 MG: 20 CAPSULE, DELAYED RELEASE ORAL at 21:47

## 2019-10-19 RX ADMIN — ACETAMINOPHEN 1000 MG: 500 TABLET, FILM COATED ORAL at 09:42

## 2019-10-19 RX ADMIN — SENNOSIDES AND DOCUSATE SODIUM 2 TABLET: 8.6; 5 TABLET ORAL at 09:43

## 2019-10-19 RX ADMIN — NICOTINE 14 MG: 14 PATCH, EXTENDED RELEASE TRANSDERMAL at 05:07

## 2019-10-19 RX ADMIN — SENNOSIDES AND DOCUSATE SODIUM 2 TABLET: 8.6; 5 TABLET ORAL at 21:47

## 2019-10-19 RX ADMIN — CEFAZOLIN 2 G: 10 INJECTION, POWDER, FOR SOLUTION INTRAVENOUS at 05:31

## 2019-10-19 RX ADMIN — PREGABALIN 75 MG: 75 CAPSULE ORAL at 21:47

## 2019-10-19 RX ADMIN — OMEPRAZOLE 40 MG: 20 CAPSULE, DELAYED RELEASE ORAL at 09:42

## 2019-10-19 RX ADMIN — OXYCODONE HYDROCHLORIDE 10 MG: 10 TABLET ORAL at 13:33

## 2019-10-19 RX ADMIN — OXYCODONE HYDROCHLORIDE 10 MG: 10 TABLET ORAL at 05:07

## 2019-10-19 RX ADMIN — MORPHINE SULFATE 15 MG: 15 TABLET, FILM COATED, EXTENDED RELEASE ORAL at 09:43

## 2019-10-19 RX ADMIN — ENOXAPARIN SODIUM 40 MG: 100 INJECTION SUBCUTANEOUS at 09:43

## 2019-10-19 RX ADMIN — MORPHINE SULFATE 15 MG: 15 TABLET, FILM COATED, EXTENDED RELEASE ORAL at 15:38

## 2019-10-19 RX ADMIN — PREDNISONE 40 MG: 20 TABLET ORAL at 09:42

## 2019-10-19 RX ADMIN — ACETAMINOPHEN 1000 MG: 500 TABLET, FILM COATED ORAL at 15:38

## 2019-10-19 RX ADMIN — OXYCODONE HYDROCHLORIDE 10 MG: 10 TABLET ORAL at 17:50

## 2019-10-19 RX ADMIN — VITAMIN D, TAB 1000IU (100/BT) 2000 UNITS: 25 TAB at 09:42

## 2019-10-19 RX ADMIN — LOSARTAN POTASSIUM 25 MG: 25 TABLET ORAL at 09:42

## 2019-10-19 RX ADMIN — CEFAZOLIN 2 G: 10 INJECTION, POWDER, FOR SOLUTION INTRAVENOUS at 22:22

## 2019-10-19 RX ADMIN — Medication 20 ML: at 09:47

## 2019-10-19 RX ADMIN — FERROUS SULFATE TAB 325 MG (65 MG ELEMENTAL FE) 325 MG: 325 (65 FE) TAB at 09:43

## 2019-10-19 NOTE — DISCHARGE PLANNING
CASE MANAGEMENT INITIAL ASSESSMENT    Admit Date:  10/17/2019     I met with patient to discuss role of case management / discharge planning / team conference.   Patient is a  44 y.o. male transferred from [facility] 10.17.19 s/p I&D pelvic osteomyelitis & abscess left pubis, cult MSSA. DX: pelvic osteomyelitis, left superior pubic ramus fracture 2ndry GLF, WBAT.  PMHx: HTN, RA on humira, migraines, obesity, hx: Lucila-en-Y gastric bypass, +smoker, MONICA does not use CPAP, bipolar, aortic root dilatation, mod aortic stenosis, hx: ulcers, chronic pain syndrome.  PLOF: mod-independent w/ FWW or SPC (recent status), lives w/ wife & children in [facility] w/ 2 HASEEB, Triage. Wife works as APRN w/ [employer].    PCP: Brit AGUDELO BRIE  Rheumatology:  Dr. Madison Conway   Ortho: Dr. You Olivares  ID: Dr. Jessica Goldman  Physiatry: Dr. Gerardo Gutierrez  [facility]  Ortho: Dr. Cliff Torrez    Admitting MD: Dr. Loi Pisano    Diagnosis: 08.3 pelvic fracture  Closed pelvic fracture (HCC)  Pelvic abscess in male (HCC)    Co-morbidities:   Patient Active Problem List    Diagnosis Date Noted   • Pubic ramus fracture (HCC) 09/28/2019     Priority: Low   • Essential hypertension 09/13/2019     Priority: Low   • Rheumatoid arthritis (HCC) 09/08/2015     Priority: Low   • Chronic pain of right knee 09/08/2015     Priority: Low   • Osteomyelitis ([facility]) 10/07/2019   • Positive occult stool blood test 09/17/2019   • Aortic root dilatation ([facility]) 09/13/2019   • Elevated alkaline phosphatase level 09/13/2019   • Aortic stenosis, moderate 09/12/2019   • Iron deficiency anemia, unspecified iron deficiency anemia type 08/15/2019   • Localized edema 08/15/2019   • Aortic ejection murmur 03/08/2019   • Obesity (BMI 35.0-39.9 without comorbidity) ([facility]) 08/09/2018   • Pagophagia 11/13/2017   • Adult ADHD 09/08/2010   • MONICA (obstructive sleep apnea) 03/09/2010   • Bleeding ulcer 05/21/2009   • Depression 05/21/2009   • Eczema 05/21/2009     Prior  Living Situation:  Housing / Facility: 1 Story House  Lives with - Patient's Self Care Capacity: Spouse, Child Less than 18 Years of Age    Prior Level of Function:  Medication Management: Independent  Finances: Independent  Home Management: Independent  Shopping: Independent  Prior Level Of Mobility: Independent With Device in Community  Driving / Transportation: Driving Independent    Support Systems:  Primary : Katiana Hubbard wife [phone]  Other support systems: not reviewed  Advance Directives: No  Power of  (Name & Phone): none    Previous Services Utilized:   Equipment Owned: Front-Wheel Walker, Single Point Cane  Prior Services: Home-Independent    Other Information:  Occupation (Pre-Hospital Vocational): Homemaker     Primary Payor Source: [employer]  Primary Care Practitioner : Brit GERONIMO  Other MDs: Dr. You grimes, Dr. Jessica Goldman ID    Patient / Family Goal:  Patient / Family Goal: To get better, To be able to walk independently    Additional Case Management Questions:  Have you ever received case management services for yourself or a family member? Yes. I have previously done  w/ kids.    Do you feel you have and an understanding of what services  provide? Yes.    Do you have any additional questions regarding case management?  Not at this time.        CASE MANAGEMENT PLAN OF CARE   Individualized Goals:   1. To get better  2. To be able to walk independently  3. Arrange post acute services: IV abx, DME, therapy    Barriers:   1. Functional mobility deficits  2. Medical co-morbidities  [place]    Plan:  1. Continue to follow patient through hospitalization and provide discharge planning in collaboration with patient, family, physicians and ancillary services.     2. Utilize community resources to ensure a safe discharge.

## 2019-10-19 NOTE — THERAPY
"Occupational Therapy  Daily Treatment     Patient Name: Richard Hubbard II  Age:  44 y.o., Sex:  male  Medical Record #: [id number]  Today's Date: 10/19/2019     Precautions  Precautions: (P) Weight Bearing As Tolerated Right Lower Extremity, Weight Bearing As Tolerated Left Lower Extremity, Fall Risk  Comments: (P) R knee brace    Safety   ADL Safety : (P) Requires Supervision for Safety, Requires Physical Assist for Safety  Bathroom Safety: (P) Requires Supervision for Safety, Requires Physical Assist for Safety  Comments: (P) see FIMs for ADL performance details.    Subjective    \"I definitely need a shower today, I've been sweating\"     Objective       10/19/19 0831   Precautions   Precautions Weight Bearing As Tolerated Right Lower Extremity;Weight Bearing As Tolerated Left Lower Extremity;Fall Risk   Comments R knee brace   Safety    ADL Safety  Requires Supervision for Safety;Requires Physical Assist for Safety   Bathroom Safety Requires Supervision for Safety;Requires Physical Assist for Safety   Comments see FIMs for ADL performance details.   Balance   Sitting Balance (Static) Fair +   Sitting Balance (Dynamic) Fair +   Standing Balance (Static) Fair -  (with GB/FWW)   Standing Balance (Dynamic) Poor +  (with GB/FWW)   Weight Shift Sitting Fair   Weight Shift Standing Fair   Skilled Intervention Verbal Cuing   Comments during ADLs and bathroom txfrs.   Bed Mobility    Supine to Sit Stand by Assist   Sit to Supine Moderate Assist  (assist to lift BLEs into bed.)   Scooting Stand by Assist   Rolling Supervised   Skilled Intervention Verbal Cuing   Interdisciplinary Plan of Care Collaboration   IDT Collaboration with  Nursing   Patient Position at End of Therapy In Bed;Call Light within Reach;Tray Table within Reach;Phone within Reach   Collaboration Comments RN re: pt requesting pain medications at end of session.   OT Total Time Spent   OT Individual Total Time Spent (Mins) 60   OT Charge Group   OT Self " Care / ADL 4       FIM Grooming Score:  5 - Standby Prompting/Supervision or Set-up  Grooming Description:  Supervision for safety, Set-up of equipment(set up for combing hair and oral care seated at sink.)    FIM Bathing Score:  4 - Minimal Assistance  Bathing Description:   Assist to wash buttocks while standing at GB; use of LH sponge to wash distal BLEs/feet.    FIM Upper Body Dressin - Standby Prompting/Supervision or Set-up  Upper Body Dressing Description:  Supervision for safety, Set-up of equipment(SBA/set up to don/doff pullover shirt.)    FIM Lower Body Dressing Score:  3 - Moderate Assistance  Lower Body Dressing Description:  Increased time, Supervision for safety, Verbal cueing, Set-up of equipment(assist to pull up shorts over B hips while standing at FWW. Pt able to thread BLEs with increased time without use of AE.)    FIM Toileting Body Dressin - Minimal Assistance  Toileting Description:  Grab bar, Increased time, Supervision for safety, Verbal cueing, Set-up of equipment    FIM Toilet Transfer Score:  3 - Moderate Assistance  Toilet Transfer Description:  Adaptive equipment, Grab bar, Increased time, Supervision for safety, Verbal cueing, Set-up of equipment(w/c<>toilet SPT with GB and lowering assist. Commode over toilet.)    FIM Tub/Shower Transfers Score:  3 - Moderate Assistance  Tub/Shower Transfers Description:  Grab bar, Supervision for safety, Verbal cueing, Set-up of equipment, Increased time, Adaptive equipment(FWW<>tub bench with lowering assist, GB and increased time.)      Assessment    Pt tolerated session well with focus on progression of ADLs and bathroom txfrs. Pt with reports of some pelvic pain, but did not limit participation in therapy session. He completed ADLs at mod A to supervision level and bathroom transfers with FWW and GB with mod A. He did not require use of AE to don shorts, but will benefit from ongoing training/education with AE as needed.      Plan    ADLs, IADLs, related functional mobility, strength/endurance building, AE training, joint protection and energy conservation education.

## 2019-10-19 NOTE — CARE PLAN
Oxycodone 10 mg given per prn order.  Patient complains of pelvic pain.    2330 states pain medication is effective.

## 2019-10-19 NOTE — CARE PLAN
Problem: Pain Management  Goal: Pain level will decrease to patient's comfort goal  Outcome: PROGRESSING AS EXPECTED     Problem: Skin Integrity  Goal: Risk for impaired skin integrity will decrease  Outcome: PROGRESSING AS EXPECTED  Incision with skin edges approximated and staples intact. Dressing changed more for comfort because of pants rubbing on the incision.     Problem: Psychosocial Needs:  Goal: Level of anxiety will decrease  Outcome: PROGRESSING AS EXPECTED

## 2019-10-20 PROCEDURE — 700111 HCHG RX REV CODE 636 W/ 250 OVERRIDE (IP): Performed by: PHYSICAL MEDICINE & REHABILITATION

## 2019-10-20 PROCEDURE — 97530 THERAPEUTIC ACTIVITIES: CPT

## 2019-10-20 PROCEDURE — 97110 THERAPEUTIC EXERCISES: CPT

## 2019-10-20 PROCEDURE — 97535 SELF CARE MNGMENT TRAINING: CPT

## 2019-10-20 PROCEDURE — 700102 HCHG RX REV CODE 250 W/ 637 OVERRIDE(OP): Performed by: PHYSICAL MEDICINE & REHABILITATION

## 2019-10-20 PROCEDURE — 700105 HCHG RX REV CODE 258: Performed by: PHYSICAL MEDICINE & REHABILITATION

## 2019-10-20 PROCEDURE — 700101 HCHG RX REV CODE 250: Performed by: PHYSICAL MEDICINE & REHABILITATION

## 2019-10-20 PROCEDURE — A9270 NON-COVERED ITEM OR SERVICE: HCPCS | Performed by: PHYSICAL MEDICINE & REHABILITATION

## 2019-10-20 PROCEDURE — 770010 HCHG ROOM/CARE - REHAB SEMI PRIVAT*

## 2019-10-20 RX ADMIN — FERROUS SULFATE TAB 325 MG (65 MG ELEMENTAL FE) 325 MG: 325 (65 FE) TAB at 08:28

## 2019-10-20 RX ADMIN — MORPHINE SULFATE 15 MG: 15 TABLET, FILM COATED, EXTENDED RELEASE ORAL at 21:56

## 2019-10-20 RX ADMIN — MORPHINE SULFATE 15 MG: 15 TABLET, FILM COATED, EXTENDED RELEASE ORAL at 14:59

## 2019-10-20 RX ADMIN — PREGABALIN 75 MG: 75 CAPSULE ORAL at 21:55

## 2019-10-20 RX ADMIN — FUROSEMIDE 20 MG: 20 TABLET ORAL at 15:50

## 2019-10-20 RX ADMIN — ACETAMINOPHEN 1000 MG: 500 TABLET, FILM COATED ORAL at 08:27

## 2019-10-20 RX ADMIN — PREDNISONE 40 MG: 20 TABLET ORAL at 08:27

## 2019-10-20 RX ADMIN — OXYCODONE HYDROCHLORIDE 10 MG: 10 TABLET ORAL at 08:28

## 2019-10-20 RX ADMIN — PREGABALIN 75 MG: 75 CAPSULE ORAL at 08:28

## 2019-10-20 RX ADMIN — CLOTRIMAZOLE AND BETAMETHASONE DIPROPIONATE: 10; .5 CREAM TOPICAL at 08:31

## 2019-10-20 RX ADMIN — OXYCODONE HYDROCHLORIDE 10 MG: 10 TABLET ORAL at 11:51

## 2019-10-20 RX ADMIN — SENNOSIDES AND DOCUSATE SODIUM 2 TABLET: 8.6; 5 TABLET ORAL at 21:55

## 2019-10-20 RX ADMIN — FUROSEMIDE 20 MG: 20 TABLET ORAL at 08:27

## 2019-10-20 RX ADMIN — OXYCODONE HYDROCHLORIDE 10 MG: 10 TABLET ORAL at 22:42

## 2019-10-20 RX ADMIN — CEFAZOLIN 2 G: 10 INJECTION, POWDER, FOR SOLUTION INTRAVENOUS at 21:59

## 2019-10-20 RX ADMIN — OMEPRAZOLE 40 MG: 20 CAPSULE, DELAYED RELEASE ORAL at 21:55

## 2019-10-20 RX ADMIN — Medication 20 ML: at 08:34

## 2019-10-20 RX ADMIN — CALCIUM CITRATE 200 MG (950 MG) TABLET 950 MG: at 08:26

## 2019-10-20 RX ADMIN — ACETAMINOPHEN 1000 MG: 500 TABLET, FILM COATED ORAL at 21:54

## 2019-10-20 RX ADMIN — LOSARTAN POTASSIUM 25 MG: 25 TABLET ORAL at 08:27

## 2019-10-20 RX ADMIN — Medication 20 ML: at 22:38

## 2019-10-20 RX ADMIN — VITAMIN D, TAB 1000IU (100/BT) 2000 UNITS: 25 TAB at 08:26

## 2019-10-20 RX ADMIN — PREGABALIN 75 MG: 75 CAPSULE ORAL at 14:59

## 2019-10-20 RX ADMIN — MORPHINE SULFATE 15 MG: 15 TABLET, FILM COATED, EXTENDED RELEASE ORAL at 08:28

## 2019-10-20 RX ADMIN — SENNOSIDES AND DOCUSATE SODIUM 2 TABLET: 8.6; 5 TABLET ORAL at 08:28

## 2019-10-20 RX ADMIN — OMEPRAZOLE 40 MG: 20 CAPSULE, DELAYED RELEASE ORAL at 08:27

## 2019-10-20 RX ADMIN — CEFAZOLIN 2 G: 10 INJECTION, POWDER, FOR SOLUTION INTRAVENOUS at 15:09

## 2019-10-20 RX ADMIN — ACETAMINOPHEN 1000 MG: 500 TABLET, FILM COATED ORAL at 14:59

## 2019-10-20 RX ADMIN — CEFAZOLIN 2 G: 10 INJECTION, POWDER, FOR SOLUTION INTRAVENOUS at 05:31

## 2019-10-20 RX ADMIN — ENOXAPARIN SODIUM 40 MG: 100 INJECTION SUBCUTANEOUS at 08:33

## 2019-10-20 RX ADMIN — OXYCODONE HYDROCHLORIDE 10 MG: 10 TABLET ORAL at 04:26

## 2019-10-20 RX ADMIN — NICOTINE 14 MG: 14 PATCH, EXTENDED RELEASE TRANSDERMAL at 05:24

## 2019-10-20 RX ADMIN — OXYCODONE HYDROCHLORIDE 10 MG: 10 TABLET ORAL at 19:29

## 2019-10-20 RX ADMIN — OXYCODONE HYDROCHLORIDE 10 MG: 10 TABLET ORAL at 15:50

## 2019-10-20 ASSESSMENT — PATIENT HEALTH QUESTIONNAIRE - PHQ9
SUM OF ALL RESPONSES TO PHQ9 QUESTIONS 1 AND 2: 0
1. LITTLE INTEREST OR PLEASURE IN DOING THINGS: NOT AT ALL
2. FEELING DOWN, DEPRESSED, IRRITABLE, OR HOPELESS: NOT AT ALL

## 2019-10-20 ASSESSMENT — PAIN SCALES - WONG BAKER: WONGBAKER_NUMERICALRESPONSE: HURTS A LITTLE MORE

## 2019-10-20 NOTE — THERAPY
"Occupational Therapy  Daily Treatment     Patient Name: Richard Hubbard II  Age:  44 y.o., Sex:  male  Medical Record #: [id number]  Today's Date: 10/20/2019     Precautions  Precautions: (P) Weight Bearing As Tolerated Right Lower Extremity, Weight Bearing As Tolerated Left Lower Extremity, Fall Risk  Comments: (P) R knee brace    Safety   ADL Safety : (P) Requires Supervision for Safety, Requires Physical Assist for Safety, Requires Cueing for Safety  Bathroom Safety: (P) Requires Supervision for Safety, Requires Physical Assist for Safety, Requires Cuing for Safety  Comments: (P) see FIMs for LB dressing, toileting and toilet txfr.    Subjective    \"I'm really frustrated today. My pain level has been horrible and I feel like I can't do anything compared to yesterday\"     Objective       10/20/19 1301   Precautions   Precautions Weight Bearing As Tolerated Right Lower Extremity;Weight Bearing As Tolerated Left Lower Extremity;Fall Risk   Comments R knee brace   Safety    ADL Safety  Requires Supervision for Safety;Requires Physical Assist for Safety;Requires Cueing for Safety   Bathroom Safety Requires Supervision for Safety;Requires Physical Assist for Safety;Requires Cuing for Safety   Comments see FIMs for LB dressing, toileting and toilet txfr.   Sitting Lower Body Exercises   Other Exercises BLE [employer] for BLE ROM/strengthening; pt only tolerated 2.5 min x2 with complaints of sharp pelvic pain at 5 min, so activity terminated.   Bed Mobility    Supine to Sit Stand by Assist   Sit to Supine Moderate Assist  (assist to lift BLEs into bed.)   Scooting Stand by Assist   Rolling Supervised   Skilled Intervention Verbal Cuing   Interdisciplinary Plan of Care Collaboration   Patient Position at End of Therapy In Bed;Call Light within Reach;Tray Table within Reach;Phone within Reach   OT Total Time Spent   OT Individual Total Time Spent (Mins) 30   OT Charge Group   OT Self Care / ADL 1   OT Therapeutic Exercise  " 1       FIM Lower Body Dressing Score:  5 - Standby Prompting/Supervsion or Set-up  Lower Body Dressing Description:  Sock aid, Increased time, Supervision for safety, Set-up of equipment(SBA/set up to don tread socks with use of sock aid seated EOB.)    FIM Toileting:  3 - Moderate Assistance  Toileting Description:  Grab bar, Increased time, Supervision for safety, Verbal cueing, Set-up of equipment    FIM Toilet Transfer Score:  3 - Moderate Assistance  Toilet Transfer Description:  Grab bar, Increased time, Supervision for safety, Verbal cueing, Set-up of equipment, Requires lift(w/c<>commode over toilet with GB and mod A for lift.)      Assessment    Pt with c/o sharp pelvic pain and required increased assistance with transfers and toileting in comparison to yesterday's OT session with this therapist. Pt remains motivated and willing to attempt to participate in therapy, but verbalizes frustration with pain level and impact on his ability to function.     Plan    Cont' strengthening, balance building, endurance building tasks for increased safety and independence with ADL/IADL pursuits

## 2019-10-20 NOTE — CARE PLAN
Problem: Safety  Goal: Will remain free from injury  Intervention: Educate patient and significant other/support system about adaptive mobility strategies and safe transfers  Note:   Using call light for assist as needed. Assisted to transfer to w/c & toilet, safe transfer observed using walker for stand and pivot. Bed in low position, call light within reach.     Problem: Pain Management  Goal: Pain level will decrease to patient's comfort goal  Intervention: Follow pain managment plan developed in collaboration with patient and Interdisciplinary Team  Note:   PRN pain med administered at hs, good effect. Resting in bed, eyes closed, resp even, unlabored.

## 2019-10-20 NOTE — THERAPY
Physical Therapy   Daily Treatment     Patient Name: Richard Hubbard II  Age:  44 y.o., Sex:  male  Medical Record #: [id number]  Today's Date: 10/20/2019     Precautions  Precautions: Weight Bearing As Tolerated Right Lower Extremity  Comments: R knee brace    Subjective    Sharp pelvic pain this afternoon     Objective       10/20/19 1401   Precautions   Precautions Weight Bearing As Tolerated Right Lower Extremity   Comments R knee brace   Supine Lower Body Exercise   Short Arc Quad 1 set of 10;Bilateral   Heel Slide 1 set of 10;Bilateral   Ankle Pumps 1 set of 10;Bilateral   PT Total Time Spent   PT Individual Total Time Spent (Mins) 30   PT Charge Group   PT Therapeutic Exercise 2       Assessment    Exercise limited by sharp groin pain    Plan    Exercise as tolerated, progress gait, continue transfers, w/c skills

## 2019-10-20 NOTE — THERAPY
Physical Therapy   Daily Treatment     Patient Name: Richard Hubbard II  Age:  44 y.o., Sex:  male  Medical Record #: [id number]  Today's Date: 10/20/2019     Precautions  Precautions: Weight Bearing As Tolerated Right Lower Extremity, Weight Bearing As Tolerated Left Lower Extremity, Fall Risk  Comments: R knee brace    Subjective    Patient reports soreness from 3 days of intense PT?     Objective         10/20/19 0901   Precautions   Precautions Weight Bearing As Tolerated Right Lower Extremity;Weight Bearing As Tolerated Left Lower Extremity   Comments R knee brace   Supine Lower Body Exercise   Supine Lower Body Exercises   (hand-out given)   Short Arc Quad 1 set of 10;Bilateral   Heel Slide Bilateral;1 set of 10   Ankle Pumps 1 set of 10;Bilateral   Sitting Lower Body Exercises   Sitting Lower Body Exercises   (hand-out given)   Ankle Pumps 1 set of 10;Bilateral   Long Arc Quad 1 set of 10;Bilateral   Marching 1 set of 10;Reciprocal   PT Total Time Spent   PT Individual Total Time Spent (Mins) 60   PT Charge Group   Charges Yes   PT Therapeutic Exercise 2   PT Therapeutic Activities 2       FIM Bed/Chair/Wheelchair Transfers Score: 3 - Moderate Assistance  Bed/Chair/Wheelchair Transfers Description:  (SBA SPT; requires mod assist for bilateral LE management into bed)      Assessment    Patient has sharp left groin pain with all AAROM bed exercises, right knee soreness; encouraged to discontinue left AAROM and progress right AAROM    Plan    Progress ambulation as tolerated; continue LE and core strengthening; practice w/c skills; review there-ex- supine and seated

## 2019-10-20 NOTE — THERAPY
"Occupational Therapy  Daily Treatment     Patient Name: Richard Hubbard II  Age:  44 y.o., Sex:  male  Medical Record #: [id number]  Today's Date: 10/20/2019     Precautions  Precautions: Weight Bearing As Tolerated Right Lower Extremity, Weight Bearing As Tolerated Left Lower Extremity, Fall Risk  Comments: R knee brace    Safety   ADL Safety : (P) Requires Supervision for Safety, Requires Physical Assist for Safety  Bathroom Safety: Requires Supervision for Safety, Requires Physical Assist for Safety  Comments: (P) See FIMs for ADL performance     Subjective    \"I've lost 100 pounds in three months.  Something is not right.\"      Objective       10/20/19 1031   Precautions   Precautions Weight Bearing As Tolerated Right Lower Extremity;Weight Bearing As Tolerated Left Lower Extremity;Fall Risk   Comments R knee brace   Safety    ADL Safety  Requires Supervision for Safety;Requires Physical Assist for Safety   Comments See FIMs for ADL performance    Pain 0 - 10 Group   Therapist Pain Assessment Post Activity Pain Same as Prior to Activity  (Right knee pain indicated during functional transfers)   Non Verbal Descriptors   Non Verbal Scale  Calm   Cognition    Orientation Level Oriented x 4   Passive ROM Upper Body   Passive ROM Upper Body WDL   Active ROM Upper Body   Active ROM Upper Body  WDL   Balance   Sitting Balance (Static) Fair +   Sitting Balance (Dynamic) Fair +   Standing Balance (Static) Fair -   Standing Balance (Dynamic) Poor +   Weight Shift Sitting Fair   Weight Shift Standing Fair   Skilled Intervention Tactile Cuing   Comments Observed during EOB dressing pursuits, FWW transfers   Bed Mobility    Supine to Sit Stand by Assist   Sit to Supine Moderate Assist  (Assist with BLE )   Scooting Stand by Assist   Rolling Supervised   Interdisciplinary Plan of Care Collaboration   IDT Collaboration with  Nursing   Patient Position at End of Therapy In Bed;Tray Table within Reach;Call Light within " Reach;Bed Alarm On;Phone within Reach   Collaboration Comments Pain medication requested end of session   OT Total Time Spent   OT Individual Total Time Spent (Mins) 60   OT Charge Group   Charges Yes   OT Self Care / ADL 3   OT Therapy Activity 1       FIM Eating Score:     Eating Description:       FIM Grooming Score:  6 - Modified Independent  Grooming Description:  Increased time(Seated shaving, hair care, oral care at sinkside)    FIM Bathing Score:     Bathing Description:       FIM Upper Body Dressin - Standby Prompting/Supervision or Set-up  Upper Body Dressing Description:  Increased time, Supervision for safety(Mod I doff bed gown EOB, S/U don pull-over t-shirt secondary to difficulty reaching item from EOB )    FIM Lower Body Dressing Score:  4 - Minimal Assistance  Lower Body Dressing Description:  Supervision for safety, Increased time, Requires minimal contact, Assist device/equipment(Donned elastic band pants, socks, shoes EOB with use of sock aid, dressing stick.  Elastic laces applied to shoes to increase ability to don/doff without external assist.  )    FIM Toileting Body Dressing:     Toileting Description:       FIM Bed/Chair/Wheelchair Transfers Score: 4 - Minimal Assistance  Bed/Chair/Wheelchair Transfers Description:  Supervision for safety, Increased time, Verbal cueing(SBA EOB to w/c with bed elevated, CGA w/c to EOB secondary to increased RLE knee pain with transitional movement. )    FIM Toilet Transfer Score:     Toilet Transfer Description:       FIM Tub/Shower Transfers Score:     Tub/Shower Transfers Description:         Assessment    Pt agreeable to ADL/functional mobility pursuits with increased time required to right knee pain/inflammation, left hip pain, and limited hip flexion capability secondary to pain. Pt utilized AE to don/doff LB clothing items with increased independence donning larger pants over hips in standing without external assist or LOB.  Alternative pain  relief routes discussed with patient to address chronic knee/RA pain.      Plan    Cont' strengthening, balance building, endurance building tasks for increased safety and independence with ADL/IADL pursuits.  TENS possibility to address chronic pain if appropriate.

## 2019-10-21 PROCEDURE — 97116 GAIT TRAINING THERAPY: CPT

## 2019-10-21 PROCEDURE — 700105 HCHG RX REV CODE 258: Performed by: PHYSICAL MEDICINE & REHABILITATION

## 2019-10-21 PROCEDURE — 97530 THERAPEUTIC ACTIVITIES: CPT

## 2019-10-21 PROCEDURE — 700102 HCHG RX REV CODE 250 W/ 637 OVERRIDE(OP)

## 2019-10-21 PROCEDURE — 97110 THERAPEUTIC EXERCISES: CPT

## 2019-10-21 PROCEDURE — 770010 HCHG ROOM/CARE - REHAB SEMI PRIVAT*

## 2019-10-21 PROCEDURE — A9270 NON-COVERED ITEM OR SERVICE: HCPCS

## 2019-10-21 PROCEDURE — 99232 SBSQ HOSP IP/OBS MODERATE 35: CPT | Performed by: PHYSICAL MEDICINE & REHABILITATION

## 2019-10-21 PROCEDURE — 700101 HCHG RX REV CODE 250: Performed by: PHYSICAL MEDICINE & REHABILITATION

## 2019-10-21 PROCEDURE — A9270 NON-COVERED ITEM OR SERVICE: HCPCS | Performed by: PHYSICAL MEDICINE & REHABILITATION

## 2019-10-21 PROCEDURE — 700111 HCHG RX REV CODE 636 W/ 250 OVERRIDE (IP): Performed by: PHYSICAL MEDICINE & REHABILITATION

## 2019-10-21 PROCEDURE — 700102 HCHG RX REV CODE 250 W/ 637 OVERRIDE(OP): Performed by: PHYSICAL MEDICINE & REHABILITATION

## 2019-10-21 PROCEDURE — 97535 SELF CARE MNGMENT TRAINING: CPT

## 2019-10-21 RX ORDER — ACETAMINOPHEN 500 MG
500 TABLET ORAL 3 TIMES DAILY
Status: DISCONTINUED | OUTPATIENT
Start: 2019-10-21 | End: 2019-11-02 | Stop reason: HOSPADM

## 2019-10-21 RX ORDER — HYDROCODONE BITARTRATE AND ACETAMINOPHEN 5; 325 MG/1; MG/1
1 TABLET ORAL
Status: DISCONTINUED | OUTPATIENT
Start: 2019-10-21 | End: 2019-11-02 | Stop reason: HOSPADM

## 2019-10-21 RX ORDER — HYDROCODONE BITARTRATE AND ACETAMINOPHEN 10; 325 MG/1; MG/1
1 TABLET ORAL
Status: DISCONTINUED | OUTPATIENT
Start: 2019-10-21 | End: 2019-11-02 | Stop reason: HOSPADM

## 2019-10-21 RX ORDER — ACETAMINOPHEN 500 MG
TABLET ORAL
Status: ACTIVE
Start: 2019-10-21 | End: 2019-10-22

## 2019-10-21 RX ORDER — HYDROCODONE BITARTRATE AND ACETAMINOPHEN 10; 325 MG/1; MG/1
TABLET ORAL
Status: COMPLETED
Start: 2019-10-21 | End: 2019-10-21

## 2019-10-21 RX ADMIN — OMEPRAZOLE 40 MG: 20 CAPSULE, DELAYED RELEASE ORAL at 08:44

## 2019-10-21 RX ADMIN — Medication 20 ML: at 08:51

## 2019-10-21 RX ADMIN — CEFAZOLIN 2 G: 10 INJECTION, POWDER, FOR SOLUTION INTRAVENOUS at 22:02

## 2019-10-21 RX ADMIN — Medication 20 ML: at 21:58

## 2019-10-21 RX ADMIN — CALCIUM CITRATE 200 MG (950 MG) TABLET 950 MG: at 08:44

## 2019-10-21 RX ADMIN — SENNOSIDES AND DOCUSATE SODIUM 2 TABLET: 8.6; 5 TABLET ORAL at 08:44

## 2019-10-21 RX ADMIN — LOSARTAN POTASSIUM 25 MG: 25 TABLET ORAL at 08:45

## 2019-10-21 RX ADMIN — MORPHINE SULFATE 15 MG: 15 TABLET, FILM COATED, EXTENDED RELEASE ORAL at 21:50

## 2019-10-21 RX ADMIN — CEFAZOLIN 2 G: 10 INJECTION, POWDER, FOR SOLUTION INTRAVENOUS at 15:03

## 2019-10-21 RX ADMIN — FUROSEMIDE 20 MG: 20 TABLET ORAL at 08:44

## 2019-10-21 RX ADMIN — FERROUS SULFATE TAB 325 MG (65 MG ELEMENTAL FE) 325 MG: 325 (65 FE) TAB at 08:44

## 2019-10-21 RX ADMIN — PREGABALIN 75 MG: 75 CAPSULE ORAL at 08:44

## 2019-10-21 RX ADMIN — OXYCODONE HYDROCHLORIDE 10 MG: 10 TABLET ORAL at 08:45

## 2019-10-21 RX ADMIN — PREGABALIN 75 MG: 75 CAPSULE ORAL at 15:06

## 2019-10-21 RX ADMIN — PREDNISONE 40 MG: 20 TABLET ORAL at 08:45

## 2019-10-21 RX ADMIN — OXYCODONE HYDROCHLORIDE 10 MG: 10 TABLET ORAL at 15:07

## 2019-10-21 RX ADMIN — ENOXAPARIN SODIUM 40 MG: 100 INJECTION SUBCUTANEOUS at 08:48

## 2019-10-21 RX ADMIN — VITAMIN D, TAB 1000IU (100/BT) 2000 UNITS: 25 TAB at 08:44

## 2019-10-21 RX ADMIN — MORPHINE SULFATE 15 MG: 15 TABLET, FILM COATED, EXTENDED RELEASE ORAL at 15:07

## 2019-10-21 RX ADMIN — HYDROCODONE BITARTRATE AND ACETAMINOPHEN 1 TABLET: 10; 325 TABLET ORAL at 21:50

## 2019-10-21 RX ADMIN — CLOTRIMAZOLE AND BETAMETHASONE DIPROPIONATE: 10; .5 CREAM TOPICAL at 21:58

## 2019-10-21 RX ADMIN — NICOTINE 14 MG: 14 PATCH, EXTENDED RELEASE TRANSDERMAL at 05:43

## 2019-10-21 RX ADMIN — OXYCODONE HYDROCHLORIDE 10 MG: 10 TABLET ORAL at 03:47

## 2019-10-21 RX ADMIN — OXYCODONE HYDROCHLORIDE 10 MG: 10 TABLET ORAL at 12:04

## 2019-10-21 RX ADMIN — ACETAMINOPHEN 1000 MG: 500 TABLET, FILM COATED ORAL at 08:44

## 2019-10-21 RX ADMIN — OMEPRAZOLE 40 MG: 20 CAPSULE, DELAYED RELEASE ORAL at 21:49

## 2019-10-21 RX ADMIN — CEFAZOLIN 2 G: 10 INJECTION, POWDER, FOR SOLUTION INTRAVENOUS at 05:45

## 2019-10-21 RX ADMIN — PREGABALIN 75 MG: 75 CAPSULE ORAL at 21:50

## 2019-10-21 RX ADMIN — MORPHINE SULFATE 15 MG: 15 TABLET, FILM COATED, EXTENDED RELEASE ORAL at 08:45

## 2019-10-21 RX ADMIN — ACETAMINOPHEN 500 MG: 500 TABLET, FILM COATED ORAL at 21:49

## 2019-10-21 RX ADMIN — ACETAMINOPHEN 1000 MG: 500 TABLET, FILM COATED ORAL at 15:07

## 2019-10-21 RX ADMIN — SENNOSIDES AND DOCUSATE SODIUM 2 TABLET: 8.6; 5 TABLET ORAL at 21:50

## 2019-10-21 RX ADMIN — CLOTRIMAZOLE AND BETAMETHASONE DIPROPIONATE: 10; .5 CREAM TOPICAL at 08:49

## 2019-10-21 ASSESSMENT — PATIENT HEALTH QUESTIONNAIRE - PHQ9
1. LITTLE INTEREST OR PLEASURE IN DOING THINGS: NOT AT ALL
SUM OF ALL RESPONSES TO PHQ9 QUESTIONS 1 AND 2: 0
2. FEELING DOWN, DEPRESSED, IRRITABLE, OR HOPELESS: NOT AT ALL

## 2019-10-21 ASSESSMENT — PAIN SCALES - WONG BAKER: WONGBAKER_NUMERICALRESPONSE: HURTS A LITTLE MORE

## 2019-10-21 NOTE — PROGRESS NOTES
Received patient during shift change, report rec'd from day shift RN. Resting in bed, VS stable on room air. Continent of B&B, min assist for transfers. A&O x 4, able to make needs known. Bed in low position, call light within reach.

## 2019-10-21 NOTE — CARE PLAN
Problem: Safety  Goal: Will remain free from injury  Outcome: PROGRESSING AS EXPECTED   Pt uses call light consistently and appropriately. Waits for assistance does not attempt self transfer this shift. Able to verbalize needs.   Problem: Infection  Goal: Will remain free from infection  Outcome: PROGRESSING AS EXPECTED   Patient in IV  Problem: Venous Thromboembolism (VTW)/Deep Vein Thrombosis (DVT) Prevention:  Goal: Patient will participate in Venous Thrombosis (VTE)/Deep Vein Thrombosis (DVT)Prevention Measures  Outcome: PROGRESSING AS EXPECTED   Patient on Lovenox therapy for DVT prophylaxis.   Problem: Pain Management  Goal: Pain level will decrease to patient's comfort goal  Outcome: PROGRESSING AS EXPECTED   Patient reports satisfactory pain control and decrease intensity after pharmacological pain management.  ABT therapy, no s/s of any adverse reaction noted. Will continue to monitor.

## 2019-10-21 NOTE — CARE PLAN
Problem: Safety  Goal: Will remain free from injury  Outcome: PROGRESSING AS EXPECTED   Pt uses call light consistently and appropriately. Waits for assistance does not attempt self transfer this shift. Able to verbalize needs.   Problem: Infection  Goal: Will remain free from infection  Outcome: PROGRESSING AS EXPECTED   Patient in ABT therapy, no s/s of any adverse reaction noted. Will continue to monitor.   Problem: Pain Management  Goal: Pain level will decrease to patient's comfort goal  Outcome: PROGRESSING AS EXPECTED   Patient reports satisfactory pain control and decrease intensity after pharmacological pain management.

## 2019-10-21 NOTE — THERAPY
"Occupational Therapy  Daily Treatment     Patient Name: Richard Hubbard II  Age:  44 y.o., Sex:  male  Medical Record #: [id number]  Today's Date: 10/21/2019     Precautions  Precautions: (P) Weight Bearing As Tolerated Right Lower Extremity, Weight Bearing As Tolerated Left Lower Extremity, Fall Risk  Comments: (P) R knee brace    Safety   ADL Safety : (P) Requires Physical Assist for Safety  Bathroom Safety: (P) Requires Physical Assist for Safety  Comments: (P) see FIMs for toileting and toilet txfr.    Subjective    \"I'm going to say I'm better than yesterday, but I'm still in so much pain\"     Objective       10/21/19 1101   Precautions   Precautions Weight Bearing As Tolerated Right Lower Extremity;Weight Bearing As Tolerated Left Lower Extremity;Fall Risk   Comments R knee brace   Safety    ADL Safety  Requires Physical Assist for Safety   Bathroom Safety Requires Physical Assist for Safety   Comments see FIMs for toileting and toilet txfr.   Pain   Intervention Repositioned;Rest;Distraction   Pain 0 - 10 Group   Location Pelvis   Location Orientation Left;Mid   Description Sharp   Therapist Pain Assessment Prior to Activity;During Activity;Post Activity   Cognition    Level of Consciousness Alert   Sitting Upper Body Exercises   Tricep Press 3 sets of 10;Bilateral;Weight (See Comments for lbs)  (Rickshaw facing fwd 3x10 with 30#, facing bwd 3x10 with 25#)   Bed Mobility    Supine to Sit Stand by Assist   Sit to Supine Moderate Assist  (assist to lift BLEs into bed.)   Scooting Stand by Assist   Rolling Supervised   Skilled Intervention Verbal Cuing   Interdisciplinary Plan of Care Collaboration   Patient Position at End of Therapy In Bed;Call Light within Reach;Tray Table within Reach;Phone within Reach   OT Total Time Spent   OT Individual Total Time Spent (Mins) 30   OT Charge Group   OT Self Care / ADL 1   OT Therapeutic Exercise  1       FIM Grooming Score:  6 - Modified Independent  Grooming " Description:  Increased time(seated at sink for oral care.)    FIM Toileting:  3 - Moderate Assistance  Toileting Description:  Grab bar, Increased time, Supervision for safety, Verbal cueing, Set-up of equipment    FIM Toilet Transfer Score:  3 - Moderate Assistance  Toilet Transfer Description:  Adaptive equipment, Grab bar, Increased time, Supervision for safety, Verbal cueing, Set-up of equipment, Requires lift(w/c<>commode over toilet with GB and mod A for lift.)      Assessment    Pt continues to report sharp pelvic pain during bed mob and transfers, but appears to have improved in comparison to yesterday's session with this therapist. He con't to require mod A for transfers with assist for lift and use of GB. Tolerated BUE therex well with goal of improving BUE strength for functional transfers.     Plan    Shower this PM. Cont' strengthening, balance building, endurance building tasks for increased safety and independence with ADL/IADL pursuits.

## 2019-10-21 NOTE — THERAPY
Physical Therapy   Daily Treatment     Patient Name: Richard Hubbard II  Age:  44 y.o., Sex:  male  Medical Record #: [id number]  Today's Date: 10/21/2019     Precautions  Precautions: Weight Bearing As Tolerated Right Lower Extremity, Weight Bearing As Tolerated Left Lower Extremity  Comments: R knee brace    Subjective    Pt resting in bed, reports on-going L groin pain but willing to participate as tolerated     Objective       10/21/19 0931   Precautions   Precautions Weight Bearing As Tolerated Right Lower Extremity;Weight Bearing As Tolerated Left Lower Extremity   Comments R knee brace   Sitting Lower Body Exercises   Sitting Lower Body Exercises Yes   Ankle Pumps 3 sets of 10   Long Arc Quad 3 sets of 10   Bed Mobility    Supine to Sit Stand by Assist   Sit to Supine Moderate Assist   Sit to Stand Stand by Assist   PT Total Time Spent   PT Individual Total Time Spent (Mins) 60   PT Charge Group   PT Gait Training 1   PT Therapeutic Exercise 2   PT Therapeutic Activities 1       FIM Bed/Chair/Wheelchair Transfers Score: 3 - Moderate Assistance  Bed/Chair/Wheelchair Transfers Description:  Adaptive equipment, Increased time, Set-up of equipment, Assist with two limbs(mod A for BLE's onto bed with sit>supine for pain control, otherwise min A with FWW)    FIM Walking Score:  1 - Total Assistance  Walking Description:  Assist device/equipment, Walker, Extra time, Requires incidental assist(CGA with // bars and R knee brace 15 ft x 3)      Assessment    Pt requires frequent rests between exercises and activity due to pain / fatigue and overall debility. Limited WB tolerance LLE due to grion pain and RLE due to significant RA/ knee instability    Plan    Progress ambulation as tolerated; continue LE and core strengthening, modalities for pain control

## 2019-10-21 NOTE — THERAPY
"Recreational Therapy   Initial Evaluation     Patient Name: Richard Hubbard II  Age:  44 y.o., Sex:  male  Medical Record #: [id number]  Today's Date: 10/21/2019     Subjective    \"I can do anything. I'm can work through the pain.\"    Objective       10/21/19 0831   Functional Ability Status - Cognitive   Attention Span Remains on Task   Comprehension Follows Three Step Commands   Judgment Able to Make Independent Decisions   Functional Ability Status - Emotional    Affect Appropriate   Mood Appropriate;Depressed   Behavior Appropriate   Emotional Comments Reporting being tearful often do to being away from his family.    Leisure Competence Measure   Leisure Awareness Independent   Leisure Attitude Independent   Leisure Skills Modified Independent  (will require adaptive equipment for most recreation )   Cultural / Social Behaviors Independent   Interpersonal Skills Independent   Community Integration Skills Independent   Social Contact Minimal Assist   Community Participation   (mostly socail with family)   Interdisciplinary Plan of Care Collaboration   IDT Collaboration with  Nursing   Patient Position at End of Therapy In Bed;Call Light within Reach;Tray Table within Reach   Collaboration Comments Nursing giving medications.    Treatment Time   Total Time Spent (mins) 30   Procedural Tracking   Procedural Tracking Community Re-Integration;Leisure Skills Awareness       Assessment  Patient is 44 y.o. male with a diagnosis of L pubic rami fx/ pelvic hematoma/ abscess and osteomyelitis s/p surgical debridement.  Additional factors influencing patient status / progress (ie: cognitive factors, co-morbidities, social support, etc): mild chronic compression fracture of T11 and paraspinal soft tissue contusions on the lumbar spine. Limited leisure interests and participation outside of family time.       Plan  Recommend Recreational Therapy 30-60 minutes per day 3-4 days per week for 3 weeks for the following " treatments:  Community Re-Integration, Community Skills Development, Leisure Skills Awareness, Leisure Skills Development, Gross Motor Functional Leisure Skills and Group Treatment    Goals:  Long term and short term goals have been discussed with patient and they are in agreement.    Recreation Therapy Problems     Problem: Recreation Therapy     Dates: Start: 10/21/19       Description:     Goal: STG-Within one week, patient will demonstrate a standing tolerance     Dates: Start: 10/21/19       Description: By remaining standing 2 minutes X4 while at the standing mat duel tasking.            Goal: STG-Within one week, patient will actively engage in planning of community re-integration outing     Dates: Start: 10/21/19       Description:           Goal: LTG-By discharge, patient will demonstrate a standing tolerance     Dates: Start: 10/21/19       Description: By remaining standing 4 minutes X4 while at the standing mat duel tasking.            Goal: LTG-By discharge, patient will     Dates: Start: 10/21/19       Description: Will share on two new leisure activities he would like to participate in following discharge and any perceived barriers to his participation.

## 2019-10-21 NOTE — THERAPY
Occupational Therapy  Daily Treatment     Patient Name: Richard Hubbard II  Age:  44 y.o., Sex:  male  Medical Record #: [id number]  Today's Date: 10/21/2019     Precautions  Precautions: Weight Bearing As Tolerated Right Lower Extremity, Weight Bearing As Tolerated Left Lower Extremity  Comments: R knee brace    Safety   ADL Safety : Requires Physical Assist for Safety  Bathroom Safety: Requires Physical Assist for Safety  Comments: see FIMs for toileting and toilet txfr.    Subjective    Patient agreeable to participate in OT.      Objective       10/21/19 1401   Precautions   Precautions Weight Bearing As Tolerated Right Lower Extremity;Weight Bearing As Tolerated Left Lower Extremity   Comments R knee brace   Safety    ADL Safety  Requires Physical Assist for Safety   Bathroom Safety Requires Physical Assist for Safety   Cognition    Cognition / Consciousness WDL   Orientation Level Oriented x 4   Level of Consciousness Alert   Interdisciplinary Plan of Care Collaboration   Patient Position at End of Therapy In Bed;Call Light within Reach   OT Total Time Spent   OT Individual Total Time Spent (Mins) 60   OT Charge Group   OT Self Care / ADL 4       FIM Grooming Score:  6 - Modified Independent  Grooming Description:  (Patient combed hair at Mod I level in w/c )    FIM Bathing Score:  4 - Minimal Assistance  Bathing Description:   FIM Bathing Score:  4 - Minimal Assistance  Bathing Description:  Increased time, Supervision for safety, Set-up of equipment, Requires minimal contact, Grab bar, Hand held shower, Tub bench(pt used LH sponge to wash distal BLEs and feet; assist required to wash buttocks while standing at GB)    FIM Upper Body Dressin - Standby Prompting/Supervision or Set-up  Upper Body Dressing Description:  (Patient doffed t-shirt and donned hospital gown with set-up while seated)    FIM Lower Body Dressing Score:  5 - Standby Prompting/Supervsion or Set-up  Lower Body Dressing Description:   (Patient donned/doffed underwear and non skid socks with set-up and SBA with use of grab bar for safety and standing balance)    FIM Tub/Shower Transfers Score:  4 - Minimal Assistance  Tub/Shower Transfers Description:  Grab bar, Increased time, Supervision for safety, Set-up of equipment(Patient performed bathing txfr with min assist and use of FWW/grab bar for safety and balance. Required min assist for STS following shower. )      Assessment    Patient tolerated OT session well with focus on ADLs. Required increased assist for shower bench > w/c txfr (min assist) vs w/c > shower bench (CGA with FWW) due to decreased strength and endurance. Presents highly motivated to participate in therapy and improve overall function and independence.     Plan    Shower this PM. Cont' strengthening, balance building, endurance building tasks for increased safety and independence with ADL/IADL pursuits.

## 2019-10-21 NOTE — REHAB-PHARMACY IDT TEAM NOTE
Pharmacy   Pharmacy  Antibiotics/Antifungals/Antivirals:  Medication:      Active Orders (From admission, onward)    Ordered     Ordering Provider       Thu Oct 17, 2019 11:48 AM    10/17/19 1148  ceFAZolin (ANCEF) 2 g in  mL IVPB  EVERY 8 HOURS      Keaton Pisano M.D.        Route:         IV  Stop Date:  11/22/2019  Reason: thigh abscess  Antihypertensives/Cardiac:  Medication:    Orders (72h ago, onward)     Start     Ordered    10/18/19 0900  losartan (COZAAR) tablet 25 mg  DAILY      10/17/19 1148    10/17/19 1600  furosemide (LASIX) tablet 20 mg  TWICE DAILY DIURETIC      10/17/19 1148    10/17/19 1148  hydrALAZINE (APRESOLINE) tablet 25 mg  EVERY 8 HOURS PRN      10/17/19 1148              Patient Vitals for the past 24 hrs:   BP Pulse   10/21/19 1400 117/82 95   10/21/19 0700 122/81 72   10/21/19 0538 108/67 67   10/20/19 1830 111/73 100     Anticoagulation:  Medication: Lovenox                               Other key medications: A review of the medication list reveals no issues at this time. Patient is currently on antihypertensive(s). Recommend home blood pressure monitoring/recording if antihypertensive(s) regimen(s) continue.    Section completed by: Aguilar Moe [facility]

## 2019-10-21 NOTE — THERAPY
Physical Therapy   Daily Treatment     Patient Name: Richard Hubbard II  Age:  44 y.o., Sex:  male  Medical Record #: [id number]  Today's Date: 10/21/2019     Precautions  Precautions: Weight Bearing As Tolerated Right Lower Extremity, Weight Bearing As Tolerated Left Lower Extremity  Comments: R knee brace    Subjective    Pt resting in bed, willing to participate     Objective       10/21/19 1231   Precautions   Precautions Weight Bearing As Tolerated Right Lower Extremity;Weight Bearing As Tolerated Left Lower Extremity   Comments R knee brace   Supine Lower Body Exercise   Short Arc Quad 3 sets of 10  (AAROM for pain control)   PT Total Time Spent   PT Individual Total Time Spent (Mins) 30   PT Charge Group   PT Gait Training 1   PT Therapeutic Exercise 1       Gait / endurance training with FWW and CGA 30 ft x 1, 15 ft x 1    Assessment    Pt remains with limited activity tolerance due to joint instability/ pain but able to ambulate with FWW x 2 trials although short distance.    Plan    OOB activity tolerance/ progressive gait training with // bars vs FWW, joint protection education, LE strength/ core strength training as tolerated

## 2019-10-21 NOTE — CARE PLAN
Problem: Safety  Goal: Will remain free from injury  Intervention: Educate patient and significant other/support system about adaptive mobility strategies and safe transfers  Note:   Using call light for assist as needed. Bed in low position, call light within reach.     Problem: Pain Management  Goal: Pain level will decrease to patient's comfort goal  Intervention: Follow pain managment plan developed in collaboration with patient and Interdisciplinary Team  Note:   PRN pain meds administered per request. Slight relief reported, documented. No s/s of distress.

## 2019-10-21 NOTE — REHAB-CM IDT TEAM NOTE
Case Management      DC Planning    DC destination/dispostion:  Home w/ wife to [place]. Has transportation.    Referrals: OP IV abx. DME & HH possible.    DC Needs: DME for patient safety & mobility. OP IV abx through 11.19.19. HH vs OP therapy. MD f/u appts.    Barriers to discharge: Functional mobility deficits[place]       Strengths: Age. PLOF independent. Supportive wife & family (wife APRN w/ [employer]). Motivation. Has transportation.    Section completed by:  Leonor Villagomez R.N.

## 2019-10-22 PROCEDURE — 700101 HCHG RX REV CODE 250: Performed by: PHYSICAL MEDICINE & REHABILITATION

## 2019-10-22 PROCEDURE — 700102 HCHG RX REV CODE 250 W/ 637 OVERRIDE(OP)

## 2019-10-22 PROCEDURE — 97116 GAIT TRAINING THERAPY: CPT

## 2019-10-22 PROCEDURE — 97110 THERAPEUTIC EXERCISES: CPT

## 2019-10-22 PROCEDURE — 97530 THERAPEUTIC ACTIVITIES: CPT

## 2019-10-22 PROCEDURE — A9270 NON-COVERED ITEM OR SERVICE: HCPCS | Performed by: PHYSICAL MEDICINE & REHABILITATION

## 2019-10-22 PROCEDURE — 770010 HCHG ROOM/CARE - REHAB SEMI PRIVAT*

## 2019-10-22 PROCEDURE — 700105 HCHG RX REV CODE 258: Performed by: PHYSICAL MEDICINE & REHABILITATION

## 2019-10-22 PROCEDURE — 97535 SELF CARE MNGMENT TRAINING: CPT

## 2019-10-22 PROCEDURE — 99233 SBSQ HOSP IP/OBS HIGH 50: CPT | Performed by: PHYSICAL MEDICINE & REHABILITATION

## 2019-10-22 PROCEDURE — 700111 HCHG RX REV CODE 636 W/ 250 OVERRIDE (IP): Performed by: PHYSICAL MEDICINE & REHABILITATION

## 2019-10-22 PROCEDURE — A9270 NON-COVERED ITEM OR SERVICE: HCPCS

## 2019-10-22 PROCEDURE — 700102 HCHG RX REV CODE 250 W/ 637 OVERRIDE(OP): Performed by: PHYSICAL MEDICINE & REHABILITATION

## 2019-10-22 PROCEDURE — 97112 NEUROMUSCULAR REEDUCATION: CPT

## 2019-10-22 RX ORDER — HYDROCODONE BITARTRATE AND ACETAMINOPHEN 10; 325 MG/1; MG/1
TABLET ORAL
Status: COMPLETED
Start: 2019-10-22 | End: 2019-10-22

## 2019-10-22 RX ADMIN — ACETAMINOPHEN 650 MG: 325 TABLET, FILM COATED ORAL at 17:38

## 2019-10-22 RX ADMIN — FUROSEMIDE 20 MG: 20 TABLET ORAL at 06:01

## 2019-10-22 RX ADMIN — HYDROCODONE BITARTRATE AND ACETAMINOPHEN 1 TABLET: 10; 325 TABLET ORAL at 18:18

## 2019-10-22 RX ADMIN — HYDROCODONE BITARTRATE AND ACETAMINOPHEN 1 TABLET: 10; 325 TABLET ORAL at 06:11

## 2019-10-22 RX ADMIN — SENNOSIDES AND DOCUSATE SODIUM 2 TABLET: 8.6; 5 TABLET ORAL at 20:03

## 2019-10-22 RX ADMIN — FUROSEMIDE 20 MG: 20 TABLET ORAL at 15:24

## 2019-10-22 RX ADMIN — CALCIUM CITRATE 200 MG (950 MG) TABLET 950 MG: at 07:57

## 2019-10-22 RX ADMIN — MORPHINE SULFATE 15 MG: 15 TABLET, FILM COATED, EXTENDED RELEASE ORAL at 07:57

## 2019-10-22 RX ADMIN — CLOTRIMAZOLE AND BETAMETHASONE DIPROPIONATE: 10; .5 CREAM TOPICAL at 08:07

## 2019-10-22 RX ADMIN — ENOXAPARIN SODIUM 40 MG: 100 INJECTION SUBCUTANEOUS at 08:07

## 2019-10-22 RX ADMIN — CEFAZOLIN 2 G: 10 INJECTION, POWDER, FOR SOLUTION INTRAVENOUS at 15:26

## 2019-10-22 RX ADMIN — NICOTINE 14 MG: 14 PATCH, EXTENDED RELEASE TRANSDERMAL at 06:01

## 2019-10-22 RX ADMIN — OMEPRAZOLE 40 MG: 20 CAPSULE, DELAYED RELEASE ORAL at 07:58

## 2019-10-22 RX ADMIN — PREGABALIN 75 MG: 75 CAPSULE ORAL at 07:57

## 2019-10-22 RX ADMIN — Medication 20 ML: at 09:53

## 2019-10-22 RX ADMIN — CEFAZOLIN 2 G: 10 INJECTION, POWDER, FOR SOLUTION INTRAVENOUS at 06:03

## 2019-10-22 RX ADMIN — OMEPRAZOLE 40 MG: 20 CAPSULE, DELAYED RELEASE ORAL at 20:03

## 2019-10-22 RX ADMIN — TRAZODONE HYDROCHLORIDE 50 MG: 50 TABLET ORAL at 00:16

## 2019-10-22 RX ADMIN — HYDROCODONE BITARTRATE AND ACETAMINOPHEN 1 TABLET: 10; 325 TABLET ORAL at 02:47

## 2019-10-22 RX ADMIN — LOSARTAN POTASSIUM 25 MG: 25 TABLET ORAL at 07:58

## 2019-10-22 RX ADMIN — Medication 20 ML: at 22:00

## 2019-10-22 RX ADMIN — MORPHINE SULFATE 15 MG: 15 TABLET, FILM COATED, EXTENDED RELEASE ORAL at 20:03

## 2019-10-22 RX ADMIN — HYDROCODONE BITARTRATE AND ACETAMINOPHEN 1 TABLET: 10; 325 TABLET ORAL at 09:45

## 2019-10-22 RX ADMIN — PREGABALIN 75 MG: 75 CAPSULE ORAL at 15:24

## 2019-10-22 RX ADMIN — Medication 20 ML: at 06:03

## 2019-10-22 RX ADMIN — PREGABALIN 75 MG: 75 CAPSULE ORAL at 20:03

## 2019-10-22 RX ADMIN — FERROUS SULFATE TAB 325 MG (65 MG ELEMENTAL FE) 325 MG: 325 (65 FE) TAB at 07:58

## 2019-10-22 RX ADMIN — CEFAZOLIN 2 G: 10 INJECTION, POWDER, FOR SOLUTION INTRAVENOUS at 22:01

## 2019-10-22 RX ADMIN — PREDNISONE 40 MG: 20 TABLET ORAL at 07:58

## 2019-10-22 RX ADMIN — HYDROCODONE BITARTRATE AND ACETAMINOPHEN 1 TABLET: 10; 325 TABLET ORAL at 22:04

## 2019-10-22 RX ADMIN — SENNOSIDES AND DOCUSATE SODIUM 2 TABLET: 8.6; 5 TABLET ORAL at 07:57

## 2019-10-22 RX ADMIN — HYDROCODONE BITARTRATE AND ACETAMINOPHEN 1 TABLET: 5; 325 TABLET ORAL at 15:24

## 2019-10-22 RX ADMIN — VITAMIN D, TAB 1000IU (100/BT) 2000 UNITS: 25 TAB at 07:58

## 2019-10-22 RX ADMIN — MORPHINE SULFATE 15 MG: 15 TABLET, FILM COATED, EXTENDED RELEASE ORAL at 15:24

## 2019-10-22 ASSESSMENT — PATIENT HEALTH QUESTIONNAIRE - PHQ9
SUM OF ALL RESPONSES TO PHQ9 QUESTIONS 1 AND 2: 0
2. FEELING DOWN, DEPRESSED, IRRITABLE, OR HOPELESS: NOT AT ALL
1. LITTLE INTEREST OR PLEASURE IN DOING THINGS: NOT AT ALL

## 2019-10-22 NOTE — THERAPY
Occupational Therapy  Daily Treatment     Patient Name: Richard Hubbard II  Age:  44 y.o., Sex:  male  Medical Record #: [id number]  Today's Date: 10/22/2019     Precautions  Precautions: (P) Weight Bearing As Tolerated Right Lower Extremity, Weight Bearing As Tolerated Left Lower Extremity  Comments: (P) R knee brace, R knee instability    Safety   ADL Safety : (P) Requires Physical Assist for Safety  Bathroom Safety: Requires Physical Assist for Safety  Comments: (P) see FIM for dressing and grooming. Able to perform at a largely SBA to mod I level. However, cont to require assistance for shoes due to swelling of feet, impaired motor control, and pain with bending.     Subjective    Pt reports initial plan for R TKA prior to recent hospitalization     Objective       10/22/19 0831   Precautions   Precautions Weight Bearing As Tolerated Right Lower Extremity;Weight Bearing As Tolerated Left Lower Extremity   Comments R knee brace, R knee instability   Safety    ADL Safety  Requires Physical Assist for Safety   Comments see FIM for dressing and grooming. Able to perform at a largely SBA to mod I level. However, cont to require assistance for shoes due to swelling of feet, impaired motor control, and pain with bending.    Sitting Upper Body Exercises   Tricep Press Weight (See Comments for lbs)  (5 sets of 10 on mChronshBlueroof 360 35lb)   Bed Mobility    Supine to Sit Stand by Assist   Sit to Supine Moderate Assist  (for BLE management)   Scooting Stand by Assist   Rolling Supervised   Skilled Intervention Verbal Cuing   Interdisciplinary Plan of Care Collaboration   Patient Position at End of Therapy In Bed;Call Light within Reach;Tray Table within Reach   OT Total Time Spent   OT Individual Total Time Spent (Mins) 60   OT Charge Group   OT Self Care / ADL 3   OT Therapeutic Exercise  1       FIM Eating Score:  7 - Independent  Eating Description:       FIM Grooming Score:  6 - Modified Independent  Grooming Description:   (seated and inc time for oral and hair care)    FIM Upper Body Dressin - Modified Independent  Upper Body Dressing Description:  (indep to retrieve doff and don pull over shirt seated)    FIM Lower Body Dressing Score:  4 - Minimal Assistance  Lower Body Dressing Description:  Sock aid, Reacher, Dressing stick, Increased time, Set-up of equipment(assistance only for donning and tying shoes. SBA and set up for remaining tasks to don underwear, pants, and socks with AE)    Collaborative DC planning: pt expresses desire to know goals to monitor progress. Discussed collaborative planning process. Expresses concern with ability to perform lat scoot in shower for washing, knee stability without brace and ability to lift legs for shower transfer.       Assessment    Pt cont to demonstrate strong motivation toward rehab, expressing desire to know goals to be able to monitor progress. Provided education on collaborate DC planning process. Demonstrates ability to perform dressing routine at a largely SBA to mod I level but cont to require assistance for donning shoes due to impaired motor control, swelling in feet, and pain with bending.     Plan    UB/LB/core strengthening. UB HEP to perform outside of therapy. Endurance building and functional transfer training. Balance and endurance.

## 2019-10-22 NOTE — REHAB-OT IDT TEAM NOTE
Occupational Therapy   Activities of Daily Living  Eating Initial:  7 - Independent  Eating Current:  7 - Independent   Eating Description:     Grooming Initial:  5 - Standby Prompting/Supervision or Set-up  Grooming Current:  6 - Modified Independent   Grooming Description:  (seated and inc time for oral and hair care)  Bathing Initial:  0 - Not tested, patient refused  Bathing Current:  4 - Minimal Assistance   Bathing Description:  Increased time, Supervision for safety, Set-up of equipment, Requires minimal contact, Grab bar, Hand held shower, Tub bench(pt used LH sponge to wash distal BLEs and feet; assist required to wash buttocks while standing at GB)  Upper Body Dressing Initial:  5 - Standby Prompting/Supervision or Set-up  Upper Body Dressing Current:  6 - Modified Independent   Upper Body Dressing Description:  (indep to retrieve doff and don pull over shirt seated)  Lower Body Dressing Initial:  2 - Max Assistance  Lower Body Dressing Current:  4 - Minimal Assistance   Lower Body Dressing Description:  4 - Minimal Assistance  Toileting Initial:  2 - Max Assistance  Toileting Current:  3 - Moderate Assistance   Toileting Description:  Grab bar, Increased time, Supervision for safety, Verbal cueing, Set-up of equipment  Toilet Transfer Initial:  3 - Moderate Assistance  Toilet Transfer Current:  3 - Moderate Assistance   Toilet Transfer Description:  3 - Moderate Assistance  Tub / Shower Transfer Initial:  0 - Not tested, patient refused  Tub / Shower Transfer Current:  4 - Minimal Assistance   Tub / Shower Transfer Description:  Grab bar, Increased time, Supervision for safety, Set-up of equipment(Patient performed bathing txfr with min assist and use of FWW/grab bar for safety and balance. Required min assist for STS following shower. )  IADL:  Not yet addressed  Family Training/Education:  Not yet addressed  DME/DC Recommendations:  TBD     Strengths:  Motivated, strong social support, indep  PLOF  Barriers:  RA, R knee weakness, pain  # of short term goals set= 4  # of short term goals met= 3  Occupational Therapy Goals     Problem: Bathing     Dates: Start: 10/22/19       Description:     Goal: STG-Within one week, patient will bathe     Dates: Start: 10/22/19       Description: 1) Individualized Goal:  At a SBA level using DME and AE as needed.   2) Interventions:  OT Orthotics Training, OT E Stim Attended, OT Self Care/ADL, OT Cognitive Skill Dev, OT Manual Ther Technique, OT Neuro Re-Ed/Balance, OT Therapeutic Activity, OT Evaluation and OT Therapeutic Exercise                   Problem: Functional Transfers     Dates: Start: 10/18/19       Description:     Goal: STG-Within one week, patient will transfer to toilet     Dates: Start: 10/18/19       Description: 1) Individualized Goal:  With CGA using commode over toilet, grab bar and FWW as needed  2) Interventions:  OT Group Therapy, OT Self Care/ADL, OT Community Reintegration, OT Manual Ther Technique, OT Neuro Re-Ed/Balance, OT Sensory Int Techniques, OT Therapeutic Activity, OT Evaluation and OT Therapeutic Exercise             Goal: STG-Within one week, patient will transfer to tub/shower     Dates: Start: 10/22/19       Description: 1) Individualized Goal:  At a SBA level using DME as needed.   2) Interventions:  OT Orthotics Training, OT E Stim Attended, OT Self Care/ADL, OT Cognitive Skill Dev, OT Manual Ther Technique, OT Neuro Re-Ed/Balance, OT Therapeutic Activity, OT Evaluation and OT Therapeutic Exercise                   Problem: OT Long Term Goals     Dates: Start: 10/18/19       Description:     Goal: LTG-By discharge, patient will complete basic self care tasks     Dates: Start: 10/18/19       Description: 1) Individualized Goal:  With mod I using AE/AD/techniques as needed.  2) Interventions:  OT Group Therapy, OT Self Care/ADL, OT Community Reintegration, OT Manual Ther Technique, OT Neuro Re-Ed/Balance, OT Sensory Int Techniques,  OT Therapeutic Activity, OT Evaluation and OT Therapeutic Exercise             Goal: LTG-By discharge, patient will perform bathroom transfers     Dates: Start: 10/18/19       Description: 1) Individualized Goal:  Mod I using AE/AD/techniques as needed  2) Interventions:  OT Group Therapy, OT Self Care/ADL, OT Community Reintegration, OT Manual Ther Technique, OT Neuro Re-Ed/Balance, OT Sensory Int Techniques, OT Therapeutic Activity, OT Evaluation and OT Therapeutic Exercise             Goal: LTG-By discharge, patient will complete basic home management     Dates: Start: 10/18/19       Description: 1) Individualized Goal:  (meal prep, laundry, kitchen cleanup) mod I using AE/AD/techniques as needed.  2) Interventions:  OT Group Therapy, OT Self Care/ADL, OT Community Reintegration, OT Manual Ther Technique, OT Neuro Re-Ed/Balance, OT Sensory Int Techniques, OT Therapeutic Activity, OT Evaluation and OT Therapeutic Exercise                   Problem: Toileting     Dates: Start: 10/22/19       Description:     Goal: STG-Within one week, patient will complete toileting tasks     Dates: Start: 10/22/19       Description: 1) Individualized Goal:  At a min A level using DME as needed.   2) Interventions:  OT Orthotics Training, OT E Stim Attended, OT Self Care/ADL, OT Cognitive Skill Dev, OT Manual Ther Technique, OT Neuro Re-Ed/Balance, OT Therapeutic Activity, OT Evaluation and OT Therapeutic Exercise                         Section completed by:  Gerardo Tabares, OT

## 2019-10-22 NOTE — THERAPY
Physical Therapy   Daily Treatment     Patient Name: Richard Hubbard II  Age:  44 y.o., Sex:  male  Medical Record #: [id number]  Today's Date: 10/22/2019     Precautions  Precautions: (P) Weight Bearing As Tolerated Right Lower Extremity, Weight Bearing As Tolerated Left Lower Extremity  Comments: (P) R knee brace/ R knee instability    Subjective    Pt resting in bed, reports fatigue but willing to participate as able     Objective       10/22/19 1531   Precautions   Precautions Weight Bearing As Tolerated Right Lower Extremity;Weight Bearing As Tolerated Left Lower Extremity   Comments R knee brace/ R knee instability   Bed Mobility    Supine to Sit Stand by Assist   Sit to Supine Moderate Assist   Sit to Stand Stand by Assist   Scooting Stand by Assist   Rolling Modified Independent   PT Total Time Spent   PT Individual Total Time Spent (Mins) 30   PT Charge Group   PT Gait Training 2       Gait endurance with Arjo walker to limit joint pressure through B wrists, min A for walker steering/management 20 ft x 3    Assessment    Pt remains with limited WB stability/support through R knee and poor activity tolerance but reports increased upper body support and less L groin pain with platform WB    Plan    OOB activity tolerance/ progressive gait training with // bars vs FWW, joint protection education, LE strength/ core strength training as tolerated

## 2019-10-22 NOTE — THERAPY
Occupational Therapy  Daily Treatment     Patient Name: Richard Hubbard II  Age:  44 y.o., Sex:  male  Medical Record #: [id number]  Today's Date: 10/22/2019     Precautions  Precautions: Weight Bearing As Tolerated Right Lower Extremity, Weight Bearing As Tolerated Left Lower Extremity  Comments: R knee brace/ R knee instability    Safety   ADL Safety : Requires Physical Assist for Safety  Bathroom Safety: Requires Physical Assist for Safety  Comments: see FIM for dressing and grooming. Able to perform at a largely SBA to mod I level. However, cont to require assistance for shoes due to swelling of feet, impaired motor control, and pain with bending.     Subjective    Pt reports prolonged sitting as a large pain trigger but requests tx outside.      Objective       10/22/19 1431   Precautions   Precautions Weight Bearing As Tolerated Right Lower Extremity;Weight Bearing As Tolerated Left Lower Extremity   Comments R knee brace/ R knee instability   Sitting Upper Body Exercises   Other Exercise Education on and practice with BUE HEP to complete outside tx with free wts: chair press (body wt 1 set of 50), ticep press L and R 4lb wt 1 set of 10, overhead elbow flex/ext 1 set of 10 L and R 4lb, diagonals 1 set of 15 L and R 2lb, ER L and R 1 set of 15 4lb. Provided pt with packet and free wts to use in rm.   Bed Mobility    Supine to Sit Stand by Assist   Sit to Supine Moderate Assist  (for BLE management)   Scooting Stand by Assist   Rolling Supervised   Skilled Intervention Verbal Cuing   Interdisciplinary Plan of Care Collaboration   IDT Collaboration with  Nursing   Patient Position at End of Therapy In Bed;Call Light within Reach;Tray Table within Reach   Collaboration Comments attempted to administer meds, pt requested to wait after tx for morphine drip   OT Total Time Spent   OT Individual Total Time Spent (Mins) 45   OT Charge Group   OT Therapy Activity 1   OT Therapeutic Exercise  2       Outdoor wc mob:  demonstrates ability to wheel over sidewalk and slight incline/declines but reports inc pain and easily fatigued.     Assessment    Pt motivated to learn and perform UB HEP. Demonstrates an understanding and ability to perform UB HEP provided at an indep level. Cont to require mod A for sit to supine. Prolonged sitting exacerbates pain.     Plan    UB/LB/core strengthening. Endurance building and functional transfer training. Balance and endurance.

## 2019-10-22 NOTE — CARE PLAN
Problem: Mobility  Goal: STG-Within one week, patient will ambulate household distance  Description  1) Individualized goal:  CGA with FWW and R knee brace x 50 ft  2) Interventions:  PT Group Therapy, PT E Stim Attended, PT Gait Training, PT Self Care/Home Eval, PT Therapeutic Exercises, PT TENS Application, PT Neuro Re-Ed/Balance, PT Aquatic Therapy, PT Therapeutic Activity, PT Manual Therapy and PT Evaluation     Outcome: NOT MET     Problem: Mobility Transfers  Goal: STG-Within one week, patient will perform bed mobility  Description  1) Individualized goal:  Min A for body mechanics/ pain control   2) Interventions:  PT Group Therapy, PT E Stim Attended, PT Gait Training, PT Self Care/Home Eval, PT Therapeutic Exercises, PT TENS Application, PT Neuro Re-Ed/Balance, PT Aquatic Therapy, PT Therapeutic Activity, PT Manual Therapy and PT Evaluation   Outcome: NOT MET  Goal: STG-Within one week, patient will sit to stand  Description  1) Individualized goal:  SPV/ set-up with FWW  2) Interventions:  PT Group Therapy, PT E Stim Attended, PT Gait Training, PT Self Care/Home Eval, PT Therapeutic Exercises, PT TENS Application, PT Neuro Re-Ed/Balance, PT Aquatic Therapy, PT Therapeutic Activity, PT Manual Therapy and PT Evaluation     Outcome: NOT MET     Problem: Mobility  Goal: STG-Within one week, patient will propel wheelchair household distances  Description  1) Individualized goal:  CGA to maintain forward momentum x 50 ft  2) Interventions:  PT Group Therapy, PT E Stim Attended, PT Gait Training, PT Self Care/Home Eval, PT Therapeutic Exercises, PT TENS Application, PT Neuro Re-Ed/Balance, PT Aquatic Therapy, PT Therapeutic Activity, PT Manual Therapy and PT Evaluation     Outcome: MET

## 2019-10-22 NOTE — REHAB-ACTIVITY IDT TEAM NOTE
"ACT   Recreation/Community     Leisure Competence Measure  Leisure Awareness: Independent  Leisure Attitude: Independent  Leisure Skills: Modified Independent(will require adaptive equipment for most recreation )  Cultural / Social Behaviors: Independent  Interpersonal Skills: Independent  Community Integration Skills: Independent  Social Contact: Minimal Assist  Community Participation: (mostly socail with family)    Strengths:  Able to follow instructions, Alert and oriented, Effective communication skills, Motivated for self care and independence, Pleasant and cooperative, Supportive family and Willingly participates in therapeutic activities  Barriers:  Decreased endurance, Generalized weakness and Poor activity tolerance  # of short term goals set=2  # of short term goals met=0    Pt has been seen by this writer once for an evaluation 10/21/19. Will work on the following goals. Pt shares motivation and that he knows how to \"work through the pain.\" Will focus on individual leisure with Pt in the coming sessions.     Recreation Therapy Problems     Problem: Recreation Therapy     Dates: Start: 10/21/19       Description:     Goal: STG-Within one week, patient will demonstrate a standing tolerance     Dates: Start: 10/21/19       Description: By remaining standing 2 minutes X4 while at the standing mat duel tasking.            Goal: STG-Within one week, patient will actively engage in planning of community re-integration outing     Dates: Start: 10/21/19       Description:           Goal: LTG-By discharge, patient will demonstrate a standing tolerance     Dates: Start: 10/21/19       Description: By remaining standing 4 minutes X4 while at the standing mat duel tasking.            Goal: LTG-By discharge, patient will     Dates: Start: 10/21/19       Description: Will share on two new leisure activities he would like to participate in following discharge and any perceived barriers to his participation.            "             Section completed by:  Kvng Vázquez, CTRS

## 2019-10-22 NOTE — PROGRESS NOTES
"Rehab Progress Note     Encounter Date: 10/22/2019    CC: left pelvic fracture, pain, RA    Interval Events (Subjective)  Patient sitting up in room. He reports he was now told that he was getting prednisone. He thinks it must be a different pill than he has gotten in the past and usually has a decent improvement in right knee swelling within the first few days so he was surprised. Otherwise he reports he will give the Hydrocodone a trial today before change Lyrica. Discussed risks and benefits. Discussed we will also have an IDT about his this afternoon to discuss discharge planning. Denies NVD. Denies constipation. No SOB.     IDT Team Meeting 10/22/2019    IKeaton M.D., was present and led the interdisciplinary team conference on 10/22/2019.  I led the IDT conference and agree with the IDT conference documentation and plan of care as noted below.     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      DC/Disposition:  11/1/19    Objective:  VITAL SIGNS: /85   Pulse 67   Temp 36.4 °C (97.5 °F) (Oral)   Resp 20   Ht 1.803 m (5' 11\") Comment: per patient  Wt 91.6 kg (201 lb 15.1 oz)   SpO2 93%   BMI 28.17 kg/m²   Gen: NAD  Psych: Mood and affect appropriate  CV: RRR, no edema  Resp: CTAB, no upper airway sounds  Abd: NTND  Neuro: AOx4, following simple commands  Unchanged from 10/21/19    No results found for this or any previous visit (from the past 72 " hour(s)).    Current Facility-Administered Medications   Medication Frequency   • HYDROcodone-acetaminophen (NORCO) 5-325 MG per tablet 1 Tab Q3HRS PRN   • HYDROcodone/acetaminophen (NORCO)  MG per tablet 1 Tab Q3HRS PRN   • acetaminophen (TYLENOL) tablet 500 mg TID   • pregabalin (LYRICA) capsule 75 mg TID   • vitamin D (cholecalciferol) tablet 2,000 Units DAILY   • normal saline PF 20 mL Q12HRS   • Respiratory Care per Protocol Continuous RT   • tramadol (ULTRAM) 50 MG tablet 50 mg Q4HRS PRN   • hydrALAZINE (APRESOLINE) tablet 25 mg Q8HRS PRN   • acetaminophen (TYLENOL) tablet 650 mg Q4HRS PRN   • senna-docusate (PERICOLACE or SENOKOT S) 8.6-50 MG per tablet 2 Tab BID    And   • polyethylene glycol/lytes (MIRALAX) PACKET 1 Packet QDAY PRN    And   • magnesium hydroxide (MILK OF MAGNESIA) suspension 30 mL QDAY PRN    And   • bisacodyl (DULCOLAX) suppository 10 mg QDAY PRN   • artificial tears ophthalmic solution 1 Drop PRN   • benzocaine-menthol (CEPACOL) lozenge 1 Lozenge Q2HRS PRN   • mag hydrox-al hydrox-simeth (MAALOX PLUS ES or MYLANTA DS) suspension 20 mL Q2HRS PRN   • ondansetron (ZOFRAN ODT) dispertab 4 mg 4X/DAY PRN    Or   • ondansetron (ZOFRAN) syringe/vial injection 4 mg 4X/DAY PRN   • traZODone (DESYREL) tablet 50 mg QHS PRN   • sodium chloride (OCEAN) 0.65 % nasal spray 2 Spray PRN   • nicotine (NICODERM) 14 MG/24HR 14 mg Daily-0600    And   • nicotine polacrilex (NICORETTE) 2 MG piece 2 mg Q HOUR PRN   • calcium citrate (CALCITRATE) tablet 950 mg DAILY   • ceFAZolin (ANCEF) 2 g in  mL IVPB Q8HRS   • enoxaparin (LOVENOX) inj 40 mg DAILY   • ferrous sulfate tablet 325 mg DAILY   • furosemide (LASIX) tablet 20 mg BID DIURETIC   • losartan (COZAAR) tablet 25 mg DAILY   • morphine ER (MS CONTIN) tablet 15 mg TID   • omeprazole (PRILOSEC) capsule 40 mg BID   • predniSONE (DELTASONE) tablet 40 mg DAILY   • simethicone (MYLICON) chewable tab 80 mg TID PRN   • clotrimazole-betamethasone  (LOTRISONE) 1-0.05 % cream BID       Orders Placed This Encounter   Procedures   • Diet Order Regular     Standing Status:   Standing     Number of Occurrences:   1     Order Specific Question:   Diet:     Answer:   Regular [1]       Assessment:  Active Hospital Problems    Diagnosis   • *Pubic ramus fracture (HCC)   • Rheumatoid arthritis (HCC)   • Iron deficiency anemia, unspecified iron deficiency anemia type   • MONICA (obstructive sleep apnea)   • Depression       Medical Decision Making and Plan:  Left pelvic fracture - managed conservatively, found to have abscess in thigh status post drainage.   -PT and OT for mobility and ADLs  -Follow-up with Orthopedics     Pain - Patient with left hip and right knee. Was supposed to get right knee replacement prior to falls and hospitalization.  -On Morphine 15 mg BID, Lyrica 25 mg TID, and scheduled Tylenol. With Norco PRN 5 to 10 mg - trial instead of Oxycodone. Discussed risks and benefits.   -Increase Lyrica to 75 mg TID     Left thigh mass/osteomyelitis - s/p drainage with MSSA growing from cultures.    -Continue Ancef 2 g q8h until 11/22/19.  Recommending weekly CBC and CMP     Fungal rash - Patient with diffuse fungal rash. Patient s/p fluconazole for 10 days. Per pharmacy no miconazole cream currently. Will switch to clotrimazole cream BID     HTN/AS - Patient on Losartan 25 mg daily, Lasix 20 mg BID      Anemia - Patient on Fe supplement on transfer. Check AM CBC - 8.3, stable. Consider Iron panel check      RA - Patient previously on DMARD, now on hold while has active osteomyelitis. On Predinsone 40 mg daily     Nicotine dependence - On patch on transfer. Will need teaching.      GERD - History of PUD. Continue Omeprazole.     Vitamin D deficiency - 1000 U on transfer. Check level - 20.  Increase to 2000 U daily     DVT ppx - Patient on Lovenox on transfer.      Total time:  35 minutes.  I spent greater than 50% of the time for patient care, counseling, and  coordination on this date, including unit/floor time, and face-to-face time with the patient as per interval events and assessment and plan above. Topics discussed included discharge planning, discussion about steroids, right knee pain and consider taper steroids. Patient was discussed separately in IDT today; please see details above.      Keaton Pisano M.D.

## 2019-10-22 NOTE — PROGRESS NOTES
"Rehab Progress Note     Encounter Date: 10/21/2019    CC: left pelvic fracture, pain, RA    Interval Events (Subjective)  Patient sitting up in room. He reports he does not feel like the Oxycodone are doing enough for his back and hip pain. Discussed trial of alternative including Hydrocodone. He would like to try the hydrocodone and will consider increasing the Lyrica if that is insufficient. Denies NVD or constipation. Denies SOB.    Objective:  VITAL SIGNS: /82   Pulse 95   Temp 36.3 °C (97.4 °F) (Oral)   Resp 18   Ht 1.803 m (5' 11\") Comment: per patient  Wt 91.6 kg (201 lb 15.1 oz)   SpO2 95%   BMI 28.17 kg/m²   Gen: NAD  Psych: Mood and affect appropriate  CV: RRR, no edema  Resp: CTAB, no upper airway sounds  Abd: NTND  Neuro: AOx4, following simple commands    No results found for this or any previous visit (from the past 72 hour(s)).    Current Facility-Administered Medications   Medication Frequency   • HYDROcodone-acetaminophen (NORCO) 5-325 MG per tablet 1 Tab Q3HRS PRN   • HYDROcodone/acetaminophen (NORCO)  MG per tablet 1 Tab Q3HRS PRN   • acetaminophen (TYLENOL) tablet 500 mg TID   • pregabalin (LYRICA) capsule 75 mg TID   • vitamin D (cholecalciferol) tablet 2,000 Units DAILY   • normal saline PF 20 mL Q12HRS   • Respiratory Care per Protocol Continuous RT   • tramadol (ULTRAM) 50 MG tablet 50 mg Q4HRS PRN   • hydrALAZINE (APRESOLINE) tablet 25 mg Q8HRS PRN   • acetaminophen (TYLENOL) tablet 650 mg Q4HRS PRN   • senna-docusate (PERICOLACE or SENOKOT S) 8.6-50 MG per tablet 2 Tab BID    And   • polyethylene glycol/lytes (MIRALAX) PACKET 1 Packet QDAY PRN    And   • magnesium hydroxide (MILK OF MAGNESIA) suspension 30 mL QDAY PRN    And   • bisacodyl (DULCOLAX) suppository 10 mg QDAY PRN   • artificial tears ophthalmic solution 1 Drop PRN   • benzocaine-menthol (CEPACOL) lozenge 1 Lozenge Q2HRS PRN   • mag hydrox-al hydrox-simeth (MAALOX PLUS ES or MYLANTA DS) suspension 20 mL Q2HRS " PRN   • ondansetron (ZOFRAN ODT) dispertab 4 mg 4X/DAY PRN    Or   • ondansetron (ZOFRAN) syringe/vial injection 4 mg 4X/DAY PRN   • traZODone (DESYREL) tablet 50 mg QHS PRN   • sodium chloride (OCEAN) 0.65 % nasal spray 2 Spray PRN   • nicotine (NICODERM) 14 MG/24HR 14 mg Daily-0600    And   • nicotine polacrilex (NICORETTE) 2 MG piece 2 mg Q HOUR PRN   • calcium citrate (CALCITRATE) tablet 950 mg DAILY   • ceFAZolin (ANCEF) 2 g in  mL IVPB Q8HRS   • enoxaparin (LOVENOX) inj 40 mg DAILY   • ferrous sulfate tablet 325 mg DAILY   • furosemide (LASIX) tablet 20 mg BID DIURETIC   • losartan (COZAAR) tablet 25 mg DAILY   • morphine ER (MS CONTIN) tablet 15 mg TID   • omeprazole (PRILOSEC) capsule 40 mg BID   • predniSONE (DELTASONE) tablet 40 mg DAILY   • simethicone (MYLICON) chewable tab 80 mg TID PRN   • clotrimazole-betamethasone (LOTRISONE) 1-0.05 % cream BID       Orders Placed This Encounter   Procedures   • Diet Order Regular     Standing Status:   Standing     Number of Occurrences:   1     Order Specific Question:   Diet:     Answer:   Regular [1]       Assessment:  Active Hospital Problems    Diagnosis   • *Pubic ramus fracture (HCC)   • Rheumatoid arthritis (HCC)   • Iron deficiency anemia, unspecified iron deficiency anemia type   • MONICA (obstructive sleep apnea)   • Depression       Medical Decision Making and Plan:  Left pelvic fracture - managed conservatively, found to have abscess in thigh status post drainage.   -PT and OT for mobility and ADLs  -Follow-up with Orthopedics     Pain - Patient with left hip and right knee. Was supposed to get right knee replacement prior to falls and hospitalization.  -On Morphine 15 mg BID, Lyrica 25 mg TID, and scheduled Tylenol. With Norco PRN 5 to 10 mg  -Increase Lyrica to 75 mg TID     Left thigh mass/osteomyelitis - s/p drainage with MSSA growing from cultures.    -Continue Ancef 2 g q8h until 11/22/19.  Recommending weekly CBC and CMP     Fungal rash -  Patient with diffuse fungal rash. Patient s/p fluconazole for 10 days. Per pharmacy no miconazole cream currently. Will switch to clotrimazole cream BID     HTN/AS - Patient on Losartan 25 mg daily, Lasix 20 mg BID      Anemia - Patient on Fe supplement on transfer. Check AM CBC - 8.3, stable. Consider Iron panel check      RA - Patient previously on DMARD, now on hold while has active osteomyelitis. On Predinsone 40 mg daily     Nicotine dependence - On patch on transfer. Will need teaching.      GERD - History of PUD. Continue Omeprazole.     Vitamin D deficiency - 1000 U on transfer. Check level - 20.  Increase to 2000 U daily     DVT ppx - Patient on Lovenox on transfer.      Total time:  25 minutes.  I spent greater than 50% of the time for patient care, counseling, and coordination on this date, including unit/floor time, and face-to-face time with the patient as per interval events and assessment and plan above. Topics discussed included pain control, change to hydrocodone and monitor for side effects.       Keaton Pisano M.D.

## 2019-10-22 NOTE — CARE PLAN
Problem: Communication  Goal: The ability to communicate needs accurately and effectively will improve  Outcome: MET  Note:   Pt is able to communicate his needs effectively to staff.      Problem: Safety  Goal: Will remain free from injury  Outcome: PROGRESSING AS EXPECTED  Note:   Pt uses call light consistently for staff assistance. Pt has good safety awareness, no impulsivity observed.      Problem: Bowel/Gastric:  Goal: Normal bowel function is maintained or improved  Outcome: PROGRESSING AS EXPECTED  Note:   Pt is continent of bowel and reports having BMs at least every other day.     Problem: Pain Management  Goal: Pain level will decrease to patient's comfort goal  Outcome: PROGRESSING SLOWER THAN EXPECTED  Note:   PRN pain medication changed from Roxicodone to Norco. Pt takes this medication about every 3-4 hours. Pain rating is usually 6-7/10.

## 2019-10-22 NOTE — CARE PLAN
Problem: Functional Transfers  Goal: STG-Within one week, patient will transfer to toilet  Description  1) Individualized Goal:  With CGA using commode over toilet, grab bar and FWW as needed  2) Interventions:  OT Group Therapy, OT Self Care/ADL, OT Community Reintegration, OT Manual Ther Technique, OT Neuro Re-Ed/Balance, OT Sensory Int Techniques, OT Therapeutic Activity, OT Evaluation and OT Therapeutic Exercise     Outcome: NOT MET     Problem: Bathing  Goal: STG-Within one week, patient will bathe  Description  1) Individualized Goal:  Body with min A using AE/AD/techniques as needed  2) Interventions:  OT Group Therapy, OT Self Care/ADL, OT Community Reintegration, OT Manual Ther Technique, OT Neuro Re-Ed/Balance, OT Sensory Int Techniques, OT Therapeutic Activity, OT Evaluation and OT Therapeutic Exercise     Outcome: MET     Problem: Dressing  Goal: STG-Within one week, patient will dress LB  Description  1) Individualized Goal:  With min A using AE/AD/techniques as needed  2) Interventions:  OT Group Therapy, OT Self Care/ADL, OT Community Reintegration, OT Manual Ther Technique, OT Neuro Re-Ed/Balance, OT Sensory Int Techniques, OT Therapeutic Activity, OT Evaluation and OT Therapeutic Exercise     Outcome: MET     Problem: Toileting  Goal: STG-Within one week, patient will complete toileting tasks  Description  1) Individualized Goal:  With mod A using grab bar, commode over toilet and FWW as needed.  2) Interventions:  OT Group Therapy, OT Self Care/ADL, OT Community Reintegration, OT Manual Ther Technique, OT Neuro Re-Ed/Balance, OT Sensory Int Techniques, OT Therapeutic Activity, OT Evaluation and OT Therapeutic Exercise     Outcome: MET

## 2019-10-22 NOTE — DISCHARGE PLANNING
Met briefly w/ patient to review IDT recommendations & targeted DC date 11.1.19. Therapy recommending w/ chair at this point in time. Ongoing IV abx through 11.19.19. Will verify preferred provider for IV abx. Per patient, wife will be administering IV abx.

## 2019-10-22 NOTE — REHAB-PT IDT TEAM NOTE
"Physical Therapy   Mobility  Bed mobility:   SBA to sit edge of bed, mod A to supine for BLE's lifting for pain control  Bed /Chair/Wheelchair Transfer Initial:  3 - Moderate Assistance  Bed /Chair/Wheelchair Transfer Current:  3 - Moderate Assistance   Bed/Chair/Wheelchair Transfer Description:  Adaptive equipment, Increased time, Set-up of equipment, Assist with two limbs(pt requires lifting assist for BLE's sit>supine/ otherwise SBA/ CGA)  Walk Initial:  1 - Total Assistance  Walk Current:  1 - Total Assistance   Walk Description:  (CGA with FWW 20 ft x 3)  Wheelchair Initial:  1 - Total Assistance  Wheelchair Current:  2 - Max Assistance   Wheelchair Description:  (SPV/ set-up x 75 ft with BUE's)  Stairs Initial:  0 - Not tested,unsafe activity  Stairs Current: 0 - Not tested,unsafe activity   Stairs Description:    Patient/Family Training/Education:  On-going strength/ endurance/ gait and modalities for pain management  DME/DC Recommendations:  FWW, 18\" lightweight wc with cushion?, home health PT, TENS unit?   Strengths:  Able to follow instructions, Independent PLOF, Making steady progress towards goals, Motivated for self care and independence, Pleasant and cooperative, Supportive family and Willingly participates in therapeutic activities  Barriers:   Generalized weakness, Home accessibility, Limited mobility, Pain poorly managed and Other: RA, R knee instability, fall risk  # of short term goals set= 4  # of short term goals met=1  Physical Therapy Problems     Problem: Mobility     Dates: Start: 10/18/19       Description:     Goal: STG-Within one week, patient will ambulate household distance     Dates: Start: 10/18/19       Description: 1) Individualized goal:  CGA with FWW and R knee brace x 50 ft  2) Interventions:  PT Group Therapy, PT E Stim Attended, PT Gait Training, PT Self Care/Home Eval, PT Therapeutic Exercises, PT TENS Application, PT Neuro Re-Ed/Balance, PT Aquatic Therapy, PT Therapeutic " Activity, PT Manual Therapy and PT Evaluation                   Problem: Mobility Transfers     Dates: Start: 10/18/19       Description:     Goal: STG-Within one week, patient will perform bed mobility     Dates: Start: 10/18/19       Description: 1) Individualized goal:  Min A for body mechanics/ pain control   2) Interventions:  PT Group Therapy, PT E Stim Attended, PT Gait Training, PT Self Care/Home Eval, PT Therapeutic Exercises, PT TENS Application, PT Neuro Re-Ed/Balance, PT Aquatic Therapy, PT Therapeutic Activity, PT Manual Therapy and PT Evaluation           Goal: STG-Within one week, patient will sit to stand     Dates: Start: 10/18/19       Description: 1) Individualized goal:  SPV/ set-up with FWW  2) Interventions:  PT Group Therapy, PT E Stim Attended, PT Gait Training, PT Self Care/Home Eval, PT Therapeutic Exercises, PT TENS Application, PT Neuro Re-Ed/Balance, PT Aquatic Therapy, PT Therapeutic Activity, PT Manual Therapy and PT Evaluation                   Problem: PT-Long Term Goals     Dates: Start: 10/18/19       Description:     Goal: LTG-By discharge, patient will propel wheelchair     Dates: Start: 10/18/19       Description: 1) Individualized goal:  Modified independent for household mobility 50 ft x 2 to/from therapies  2) Interventions:  PT Group Therapy, PT E Stim Attended, PT Gait Training, PT Self Care/Home Eval, PT Therapeutic Exercises, PT TENS Application, PT Neuro Re-Ed/Balance, PT Aquatic Therapy, PT Therapeutic Activity, PT Manual Therapy and PT Evaluation             Goal: LTG-By discharge, patient will ambulate     Dates: Start: 10/18/19       Description: 1) Individualized goal:  Modified independent with FWW for household mobility 50 ft x 3  2) Interventions:  PT Group Therapy, PT E Stim Attended, PT Gait Training, PT Self Care/Home Eval, PT Therapeutic Exercises, PT TENS Application, PT Neuro Re-Ed/Balance, PT Aquatic Therapy, PT Therapeutic Activity, PT Manual Therapy and  PT Evaluation             Goal: LTG-By discharge, patient will transfer one surface to another     Dates: Start: 10/18/19       Description: 1) Individualized goal:  Modified independent with FWW  2) Interventions:  PT Group Therapy, PT E Stim Attended, PT Gait Training, PT Self Care/Home Eval, PT Therapeutic Exercises, PT TENS Application, PT Neuro Re-Ed/Balance, PT Aquatic Therapy, PT Therapeutic Activity, PT Manual Therapy and PT Evaluation             Goal: LTG-By discharge, patient will ambulate up/down 4-6 stairs     Dates: Start: 10/18/19       Description: 1) Individualized goal:  SBA with hand rail vs SPC  2) Interventions:  PT Group Therapy, PT E Stim Attended, PT Gait Training, PT Self Care/Home Eval, PT Therapeutic Exercises, PT TENS Application, PT Neuro Re-Ed/Balance, PT Aquatic Therapy, PT Therapeutic Activity, PT Manual Therapy and PT Evaluation             Goal: LTG-By discharge, patient will transfer in/out of a car     Dates: Start: 10/18/19       Description: 1) Individualized goal:  Modified independent with FWW  2) Interventions:  PT Group Therapy, PT E Stim Attended, PT Gait Training, PT Self Care/Home Eval, PT Therapeutic Exercises, PT TENS Application, PT Neuro Re-Ed/Balance, PT Aquatic Therapy, PT Therapeutic Activity, PT Manual Therapy and PT Evaluation                           Section completed by:  CARLA BrookeT, ATP

## 2019-10-22 NOTE — REHAB-COLLABORATIVE ONGOING IDT TEAM NOTE
Weekly Interdisciplinary Team Conference Note    Weekly Interdisciplinary Team Conference # 1  Date:  10/22/2019    Clinicians present and reporting at team conference include the following:   MD: STEPHON Pisano MD    RN:  Karli Chiu RN    PT:   Virgen Bryant PT, BSPT  OT:  Gerardo Tabares OTR/L    ST:  Not Applicable.   CM:  Leonor Villagomez RN [facility]  REC:  Kvng Vázquez, CTRS  RT:  Not Applicable  RPh:  Trudy Laboy, [facility]  All reporting clinicians have a working knowledge of this patient's plan of care.    Targeted DC Date:  11.1.19     Medical    Patient Active Problem List    Diagnosis Date Noted   • Pubic ramus fracture ([facility]) 09/28/2019     Priority: Low   • Essential hypertension 09/13/2019     Priority: Low   • Rheumatoid arthritis ([facility]) 09/08/2015     Priority: Low   • Chronic pain of right knee 09/08/2015     Priority: Low   • Osteomyelitis ([facility]) 10/07/2019   • Positive occult stool blood test 09/17/2019   • Aortic root dilatation ([facility]) 09/13/2019   • Elevated alkaline phosphatase level 09/13/2019   • Aortic stenosis, moderate 09/12/2019   • Iron deficiency anemia, unspecified iron deficiency anemia type 08/15/2019   • Localized edema 08/15/2019   • Aortic ejection murmur 03/08/2019   • Obesity (BMI 35.0-39.9 without comorbidity) ([facility]) 08/09/2018   • Pagophagia 11/13/2017   • Adult ADHD 09/08/2010   • MONICA (obstructive sleep apnea) 03/09/2010   • Bleeding ulcer 05/21/2009   • Depression 05/21/2009   • Eczema 05/21/2009     Results     ** No results found for the last 24 hours. **        Nursing  Diet/Nutrition:  Regular and Thin Liquids  Medication Administration:  Whole with Liquid Wash  % consumed at meals in last 24 hours:  Consumed % of meals per documentation.  Meal/Snack Consumption for the past 24 hrs:   Oral Nutrition   10/21/19 1400 Between 50-75% Consumed   10/21/19 0900 Between % Consumed     Snack schedule:  None  Appetite:  Good  Admit Weight:  Weight: 91.6 kg (201 lb 15.1  oz)  Weight Last 7 Days   [91.6 kg (201 lb 15.1 oz)] 91.6 kg (201 lb 15.1 oz) (10/17 1130)    Pain Issues:    Location:  Pelvis (10/22 0602)  Left;Mid (10/22 0602)         Severity:  Moderate   Scheduled pain medications:  morphine SR (MS CONTIN)  + Tylenol + Lyrica    PRN pain medications used in last 24 hours:  hydrocodone/acetaminophen (NORCO)  and oxycodone immediate release (ROXICODONE)    Non Pharmacologic Interventions:  distraction, emotional support, relaxation, repositioned and rest  Effectiveness of pain management plan:  fair=improved comfort with interventions but does not always meet goal    Bowel:    Bowel Assist Initial Score:  3 - Moderate Assistance  Bowel Assist Current Score:  3 - Moderate Assistance  Bowl Accidents in last 7 days:     Last bowel movement: 10/21/19(per pt)  Stool Description: Large, Brown, Formed     Usual bowel pattern:  daily  Scheduled bowel medications:  senna-docusate (PERICOLACE or SENOKOT S)   PRN bowel medications used in last 24 hours:  None  Nursing Interventions:  Increased time, PRN medication, Scheduled medication, Supervision, Verbal cueing, Positioning on commode/toilet  Effectiveness of bowel program:   good=regular, predictable, controlled emptying of bowel     Bladder:    Bladder Assist Initial Score:  4 - Minimal Assistance  Bladder Assist Current Score:  5 - Standby Prompting/Supervision or Set-up  Bladder Accidents in last 7 days:  0  Medications affecting bladder:  None    Time void schedule/voiding pattern:  Voiding every 2-4 hours  Interventions:  Increased time, Emptying of device, Urinal, Time void patient initiated  Effectiveness of bladder training:  Good=regular, predictable, emptying of bladder, patient initiates time voiding    Wound:       Abdominal Incision - approximated with staples and dressed with dry gauze and tegaderm.         Interventions:  Changing dressing as needed and monitoring for s/s of infection.  Effectiveness of intervention:   wound is unchanged     Sleep/wake cycle:   Average hours slept:  Sleeps 3-4 hours without waking  Sleep medication usage:  Other PRN Trazodone 50mg    Patient/Family Training/Education:  Fall Prevention, General Self Care, Medication Management, Pain Management, Safe Transfers, Safety, Skin Care and Wound Care  Discharge Supply Recommendations:  Blood Pressure Monitor and Wound Care Supplies  Strengths: Alert and oriented, Willingly participates in therapeutic activities, Able to follow instructions, Supportive family, Pleasant and cooperative, Effective communication skills, Good carryover of learning and Motivated for self care and independence   Barriers:   Limited mobility       Nursing Problems     Problem: Bowel/Gastric:     Description:     Goal: Normal bowel function is maintained or improved     Description:           Goal: Will not experience complications related to bowel motility     Description:                 Problem: Discharge Barriers/Planning     Description:     Goal: Patient's continuum of care needs will be met     Description:                 Problem: Infection     Description:     Goal: Will remain free from infection     Description:                 Problem: Knowledge Deficit     Description:     Goal: Knowledge of disease process/condition, treatment plan, diagnostic tests, and medications will improve     Description:           Goal: Knowledge of the prescribed therapeutic regimen will improve     Description:                 Problem: Pain Management     Description:     Goal: Pain level will decrease to patient's comfort goal     Description:                 Problem: Psychosocial Needs:     Description:     Goal: Level of anxiety will decrease     Description:                 Problem: Respiratory:     Description:     Goal: Respiratory status will improve     Description:                 Problem: Safety     Description:     Goal: Will remain free from injury     Description:            Goal: Will remain free from falls     Description:                 Problem: Skin Integrity     Description:     Goal: Risk for impaired skin integrity will decrease     Description:                 Problem: Venous Thromboembolism (VTW)/Deep Vein Thrombosis (DVT) Prevention:     Description:     Goal: Patient will participate in Venous Thrombosis (VTE)/Deep Vein Thrombosis (DVT)Prevention Measures     Description:                        Long Term Goals:   At discharge patient will be able to function safely at home and in the community with support.    Section completed by:  Torsten Martinez R.N.              Activities of Daily Living  Eating Initial:  7 - Independent  Eating Current:  7 - Independent   Eating Description:     Grooming Initial:  5 - Standby Prompting/Supervision or Set-up  Grooming Current:  6 - Modified Independent   Grooming Description:  (seated and inc time for oral and hair care)  Bathing Initial:  0 - Not tested, patient refused  Bathing Current:  4 - Minimal Assistance   Bathing Description:  Increased time, Supervision for safety, Set-up of equipment, Requires minimal contact, Grab bar, Hand held shower, Tub bench(pt used LH sponge to wash distal BLEs and feet; assist required to wash buttocks while standing at GB)  Upper Body Dressing Initial:  5 - Standby Prompting/Supervision or Set-up  Upper Body Dressing Current:  6 - Modified Independent   Upper Body Dressing Description:  (indep to retrieve doff and don pull over shirt seated)  Lower Body Dressing Initial:  2 - Max Assistance  Lower Body Dressing Current:  4 - Minimal Assistance   Lower Body Dressing Description:  4 - Minimal Assistance  Toileting Initial:  2 - Max Assistance  Toileting Current:  3 - Moderate Assistance   Toileting Description:  Grab bar, Increased time, Supervision for safety, Verbal cueing, Set-up of equipment  Toilet Transfer Initial:  3 - Moderate Assistance  Toilet Transfer Current:  3 - Moderate  Assistance   Toilet Transfer Description:  3 - Moderate Assistance  Tub / Shower Transfer Initial:  0 - Not tested, patient refused  Tub / Shower Transfer Current:  4 - Minimal Assistance   Tub / Shower Transfer Description:  Grab bar, Increased time, Supervision for safety, Set-up of equipment(Patient performed bathing txfr with min assist and use of FWW/grab bar for safety and balance. Required min assist for STS following shower. )  IADL:  Not yet addressed  Family Training/Education:  Not yet addressed  DME/DC Recommendations:  TBD     Strengths:  Motivated, strong social support, indep PLOF  Barriers:  RA, R knee weakness, pain  # of short term goals set= 4  # of short term goals met= 3  Occupational Therapy Goals     Problem: Bathing     Dates: Start: 10/22/19       Description:     Goal: STG-Within one week, patient will bathe     Dates: Start: 10/22/19       Description: 1) Individualized Goal:  At a SBA level using DME and AE as needed.   2) Interventions:  OT Orthotics Training, OT E Stim Attended, OT Self Care/ADL, OT Cognitive Skill Dev, OT Manual Ther Technique, OT Neuro Re-Ed/Balance, OT Therapeutic Activity, OT Evaluation and OT Therapeutic Exercise                   Problem: Functional Transfers     Dates: Start: 10/18/19       Description:     Goal: STG-Within one week, patient will transfer to toilet     Dates: Start: 10/18/19       Description: 1) Individualized Goal:  With CGA using commode over toilet, grab bar and FWW as needed  2) Interventions:  OT Group Therapy, OT Self Care/ADL, OT Community Reintegration, OT Manual Ther Technique, OT Neuro Re-Ed/Balance, OT Sensory Int Techniques, OT Therapeutic Activity, OT Evaluation and OT Therapeutic Exercise             Goal: STG-Within one week, patient will transfer to tub/shower     Dates: Start: 10/22/19       Description: 1) Individualized Goal:  At a SBA level using DME as needed.   2) Interventions:  OT Orthotics Training, OT E Stim Attended,  OT Self Care/ADL, OT Cognitive Skill Dev, OT Manual Ther Technique, OT Neuro Re-Ed/Balance, OT Therapeutic Activity, OT Evaluation and OT Therapeutic Exercise                   Problem: OT Long Term Goals     Dates: Start: 10/18/19       Description:     Goal: LTG-By discharge, patient will complete basic self care tasks     Dates: Start: 10/18/19       Description: 1) Individualized Goal:  With mod I using AE/AD/techniques as needed.  2) Interventions:  OT Group Therapy, OT Self Care/ADL, OT Community Reintegration, OT Manual Ther Technique, OT Neuro Re-Ed/Balance, OT Sensory Int Techniques, OT Therapeutic Activity, OT Evaluation and OT Therapeutic Exercise             Goal: LTG-By discharge, patient will perform bathroom transfers     Dates: Start: 10/18/19       Description: 1) Individualized Goal:  Mod I using AE/AD/techniques as needed  2) Interventions:  OT Group Therapy, OT Self Care/ADL, OT Community Reintegration, OT Manual Ther Technique, OT Neuro Re-Ed/Balance, OT Sensory Int Techniques, OT Therapeutic Activity, OT Evaluation and OT Therapeutic Exercise             Goal: LTG-By discharge, patient will complete basic home management     Dates: Start: 10/18/19       Description: 1) Individualized Goal:  (meal prep, laundry, kitchen cleanup) mod I using AE/AD/techniques as needed.  2) Interventions:  OT Group Therapy, OT Self Care/ADL, OT Community Reintegration, OT Manual Ther Technique, OT Neuro Re-Ed/Balance, OT Sensory Int Techniques, OT Therapeutic Activity, OT Evaluation and OT Therapeutic Exercise                   Problem: Toileting     Dates: Start: 10/22/19       Description:     Goal: STG-Within one week, patient will complete toileting tasks     Dates: Start: 10/22/19       Description: 1) Individualized Goal:  At a min A level using DME as needed.   2) Interventions:  OT Orthotics Training, OT E Stim Attended, OT Self Care/ADL, OT Cognitive Skill Dev, OT Manual Ther Technique, OT Neuro  "Re-Ed/Balance, OT Therapeutic Activity, OT Evaluation and OT Therapeutic Exercise                         Section completed by:  Gerardo Tabares OT       Recreation/Community     Leisure Competence Measure  Leisure Awareness: Independent  Leisure Attitude: Independent  Leisure Skills: Modified Independent(will require adaptive equipment for most recreation )  Cultural / Social Behaviors: Independent  Interpersonal Skills: Independent  Community Integration Skills: Independent  Social Contact: Minimal Assist  Community Participation: (mostly socail with family)    Strengths:  Able to follow instructions, Alert and oriented, Effective communication skills, Motivated for self care and independence, Pleasant and cooperative, Supportive family and Willingly participates in therapeutic activities  Barriers:  Decreased endurance, Generalized weakness and Poor activity tolerance  # of short term goals set=2  # of short term goals met=0    Pt has been seen by this writer once for an evaluation 10/21/19. Will work on the following goals. Pt shares motivation and that he knows how to \"work through the pain.\" Will focus on individual leisure with Pt in the coming sessions.     Recreation Therapy Problems     Problem: Recreation Therapy     Dates: Start: 10/21/19       Description:     Goal: STG-Within one week, patient will demonstrate a standing tolerance     Dates: Start: 10/21/19       Description: By remaining standing 2 minutes X4 while at the standing mat duel tasking.            Goal: STG-Within one week, patient will actively engage in planning of community re-integration outing     Dates: Start: 10/21/19       Description:           Goal: LTG-By discharge, patient will demonstrate a standing tolerance     Dates: Start: 10/21/19       Description: By remaining standing 4 minutes X4 while at the standing mat duel tasking.            Goal: LTG-By discharge, patient will     Dates: Start: 10/21/19       Description: " Will share on two new leisure activities he would like to participate in following discharge and any perceived barriers to his participation.                        Section completed by:  Kvng Vázquez, CTRS       REHAB-Pharmacy IDT Team Note by Aguilar Moe RPH at 10/21/2019  2:57 PM  Version 1 of 1    Author:  Aguilar Moe RPH Service:  -- Author Type:  Pharmacist    Filed:  10/21/2019  3:33 PM Date of Service:  10/21/2019  2:57 PM Status:  Signed    :  Aguilar Moe RPH (Pharmacist)         Pharmacy[AW.1]   Pharmacy  Antibiotics/Antifungals/Antivirals:  Medication:      Active Orders (From admission, onward)    Ordered     Ordering Provider       Thu Oct 17, 2019 11:48 AM    10/17/19 1148  ceFAZolin (ANCEF) 2 g in  mL IVPB  EVERY 8 HOURS      Keaton Pisano M.D.        Route:         IV  Stop Date:  11/22/2019  Reason:[AW.2] thigh abscess[AW.3]  Antihypertensives/Cardiac:  Medication:    Orders (72h ago, onward)     Start     Ordered    10/18/19 0900  losartan (COZAAR) tablet 25 mg  DAILY      10/17/19 1148    10/17/19 1600  furosemide (LASIX) tablet 20 mg  TWICE DAILY DIURETIC      10/17/19 1148    10/17/19 1148  hydrALAZINE (APRESOLINE) tablet 25 mg  EVERY 8 HOURS PRN      10/17/19 1148              Patient Vitals for the past 24 hrs:   BP Pulse   10/21/19 1400 117/82 95   10/21/19 0700 122/81 72   10/21/19 0538 108/67 67   10/20/19 1830 111/73 100     Anticoagulation:[AW.2]  Medication: Lovenox                               Other key medications: A review of the medication list reveals no issues at this time. Patient is currently on antihypertensive(s). Recommend home blood pressure monitoring/recording if antihypertensive(s) regimen(s) continue.    Section completed by: Aguilar Moe RPh[AW.3]     Attribution Jones     AW.1 - Aguilar Moe RPH on 10/21/2019  2:58 PM   AW.2 - Aguilar Moe RPH on 10/21/2019  2:57 PM   AW.3 - Aguilar Moe RPH on 10/21/2019  3:26  PM                    DC Planning    DC destination/dispostion:  Home w/ wife to [place]. Has transportation.    Referrals: OP IV abx. DME & HH possible.    DC Needs: DME for patient safety & mobility. OP IV abx through 11.19.19. HH vs OP therapy. MD f/u appts.    Barriers to discharge: Functional mobility deficits[place]       Strengths: Age. PLOF independent. Supportive wife & family (wife APRN w/ [employer]). Motivation. Has transportation.    Section completed by:  Leonor Villagomez R.N.    Summary: ongoing pain mgmt issues, CGA most functional mobility except lifting legs into bed, gait 25-30ft w/ FWW, pain limiting activity, IV abx through 11.19.19.    Physician Summary  STEPHON Pisano MD  participated and led team conference discussion.

## 2019-10-23 PROCEDURE — 97530 THERAPEUTIC ACTIVITIES: CPT

## 2019-10-23 PROCEDURE — A9270 NON-COVERED ITEM OR SERVICE: HCPCS | Performed by: PHYSICAL MEDICINE & REHABILITATION

## 2019-10-23 PROCEDURE — 700111 HCHG RX REV CODE 636 W/ 250 OVERRIDE (IP): Performed by: PHYSICAL MEDICINE & REHABILITATION

## 2019-10-23 PROCEDURE — 99232 SBSQ HOSP IP/OBS MODERATE 35: CPT | Performed by: PHYSICAL MEDICINE & REHABILITATION

## 2019-10-23 PROCEDURE — 700102 HCHG RX REV CODE 250 W/ 637 OVERRIDE(OP): Performed by: PHYSICAL MEDICINE & REHABILITATION

## 2019-10-23 PROCEDURE — 97535 SELF CARE MNGMENT TRAINING: CPT

## 2019-10-23 PROCEDURE — 700105 HCHG RX REV CODE 258: Performed by: PHYSICAL MEDICINE & REHABILITATION

## 2019-10-23 PROCEDURE — 770010 HCHG ROOM/CARE - REHAB SEMI PRIVAT*

## 2019-10-23 PROCEDURE — 700101 HCHG RX REV CODE 250: Performed by: PHYSICAL MEDICINE & REHABILITATION

## 2019-10-23 PROCEDURE — 97116 GAIT TRAINING THERAPY: CPT

## 2019-10-23 RX ADMIN — HYDROCODONE BITARTRATE AND ACETAMINOPHEN 1 TABLET: 10; 325 TABLET ORAL at 12:28

## 2019-10-23 RX ADMIN — PREDNISONE 40 MG: 20 TABLET ORAL at 09:08

## 2019-10-23 RX ADMIN — OMEPRAZOLE 40 MG: 20 CAPSULE, DELAYED RELEASE ORAL at 21:48

## 2019-10-23 RX ADMIN — CALCIUM CITRATE 200 MG (950 MG) TABLET 950 MG: at 09:08

## 2019-10-23 RX ADMIN — HYDROCODONE BITARTRATE AND ACETAMINOPHEN 1 TABLET: 10; 325 TABLET ORAL at 01:50

## 2019-10-23 RX ADMIN — PREGABALIN 75 MG: 75 CAPSULE ORAL at 21:48

## 2019-10-23 RX ADMIN — MORPHINE SULFATE 15 MG: 15 TABLET, FILM COATED, EXTENDED RELEASE ORAL at 15:15

## 2019-10-23 RX ADMIN — CEFAZOLIN 2 G: 10 INJECTION, POWDER, FOR SOLUTION INTRAVENOUS at 14:40

## 2019-10-23 RX ADMIN — SENNOSIDES AND DOCUSATE SODIUM 2 TABLET: 8.6; 5 TABLET ORAL at 09:08

## 2019-10-23 RX ADMIN — PREGABALIN 75 MG: 75 CAPSULE ORAL at 15:15

## 2019-10-23 RX ADMIN — HYDROCODONE BITARTRATE AND ACETAMINOPHEN 1 TABLET: 10; 325 TABLET ORAL at 09:12

## 2019-10-23 RX ADMIN — CEFAZOLIN 2 G: 10 INJECTION, POWDER, FOR SOLUTION INTRAVENOUS at 06:00

## 2019-10-23 RX ADMIN — HYDROCODONE BITARTRATE AND ACETAMINOPHEN 1 TABLET: 10; 325 TABLET ORAL at 18:33

## 2019-10-23 RX ADMIN — Medication 20 ML: at 21:54

## 2019-10-23 RX ADMIN — HYDROCODONE BITARTRATE AND ACETAMINOPHEN 1 TABLET: 10; 325 TABLET ORAL at 21:48

## 2019-10-23 RX ADMIN — ACETAMINOPHEN 500 MG: 500 TABLET, FILM COATED ORAL at 15:14

## 2019-10-23 RX ADMIN — TRAZODONE HYDROCHLORIDE 50 MG: 50 TABLET ORAL at 00:04

## 2019-10-23 RX ADMIN — FERROUS SULFATE TAB 325 MG (65 MG ELEMENTAL FE) 325 MG: 325 (65 FE) TAB at 09:08

## 2019-10-23 RX ADMIN — CEFAZOLIN 2 G: 10 INJECTION, POWDER, FOR SOLUTION INTRAVENOUS at 21:54

## 2019-10-23 RX ADMIN — ACETAMINOPHEN 500 MG: 500 TABLET, FILM COATED ORAL at 09:08

## 2019-10-23 RX ADMIN — HYDROCODONE BITARTRATE AND ACETAMINOPHEN 1 TABLET: 10; 325 TABLET ORAL at 06:01

## 2019-10-23 RX ADMIN — LOSARTAN POTASSIUM 25 MG: 25 TABLET ORAL at 09:08

## 2019-10-23 RX ADMIN — SENNOSIDES AND DOCUSATE SODIUM 2 TABLET: 8.6; 5 TABLET ORAL at 21:48

## 2019-10-23 RX ADMIN — ENOXAPARIN SODIUM 40 MG: 100 INJECTION SUBCUTANEOUS at 09:05

## 2019-10-23 RX ADMIN — MORPHINE SULFATE 15 MG: 15 TABLET, FILM COATED, EXTENDED RELEASE ORAL at 21:48

## 2019-10-23 RX ADMIN — Medication 20 ML: at 09:09

## 2019-10-23 RX ADMIN — VITAMIN D, TAB 1000IU (100/BT) 2000 UNITS: 25 TAB at 09:08

## 2019-10-23 RX ADMIN — PREGABALIN 75 MG: 75 CAPSULE ORAL at 09:08

## 2019-10-23 RX ADMIN — FUROSEMIDE 20 MG: 20 TABLET ORAL at 15:15

## 2019-10-23 RX ADMIN — NICOTINE 14 MG: 14 PATCH, EXTENDED RELEASE TRANSDERMAL at 05:58

## 2019-10-23 RX ADMIN — OMEPRAZOLE 40 MG: 20 CAPSULE, DELAYED RELEASE ORAL at 09:08

## 2019-10-23 RX ADMIN — MORPHINE SULFATE 15 MG: 15 TABLET, FILM COATED, EXTENDED RELEASE ORAL at 09:08

## 2019-10-23 NOTE — THERAPY
Occupational Therapy  Daily Treatment     Patient Name: Richard Hubbard II  Age:  44 y.o., Sex:  male  Medical Record #: [id number]  Today's Date: 10/23/2019     Precautions  Precautions: Weight Bearing As Tolerated Right Lower Extremity, Weight Bearing As Tolerated Left Lower Extremity  Comments: R knee brace/ R knee instability    Safety   ADL Safety : Requires Supervision for Safety  Bathroom Safety: Requires Supervision for Safety  Comments: see FIM for dressing and grooming. Able to perform at a largely SBA to mod I level. However, cont to require assistance for shoes due to swelling of feet, impaired motor control, and pain with bending.     Subjective       Objective       10/23/19 0831   Precautions   Precautions Weight Bearing As Tolerated Right Lower Extremity;Weight Bearing As Tolerated Left Lower Extremity   Comments R knee brace/ R knee instability   Vitals   O2 Delivery None (Room Air)   Pain   Intervention Education;Emotional Support   Cognition    Level of Consciousness Alert   Bed Mobility    Supine to Sit Stand by Assist   Scooting Stand by Assist   Interdisciplinary Plan of Care Collaboration   IDT Collaboration with  Certified Nursing Assistant;Nursing;Certified O.T. Assistant  (DUBON)   Patient Position at End of Therapy Seated;Self Releasing Lap Belt Applied;Tray Table within Reach;Phone within Reach;Call Light within Reach   Collaboration Comments Transition of care to CNA   OT Total Time Spent   OT Individual Total Time Spent (Mins) 30   OT Charge Group   OT Self Care / ADL 1   OT Therapy Activity 1       FIM Grooming Score:     Grooming Description:       FIM Toiletin - Minimal Assistance  Toileting Description:  Grab bar, Increased time, Supervision for safety    FIM Toilet Transfer Score:  5 - Standby Prompting/Supervision or Set-up  Toilet Transfer Description:  Grab bar, Increased time, Supervision for safety, Set-up of equipment      Assessment    Pt was alert and cooperative w/  tx.  Pt concerned of schedulingand shower task - therapist oriented pt to therapy, OT and CNA - CNA to shower M/W/F and OT every 3-4 days.  Pt requested therpy no ealier than 9am - therapist submitted request.  Limiters include BLE WB status, generalized weakness, impaired endurance and standing balance.    Plan   ADL  IADL  Related mobility ,strength/endurance building incorporating joint protection and energy conservation

## 2019-10-23 NOTE — THERAPY
"Recreational Therapy  Daily Treatment     Patient Name: Richard Hubbard II  AGE:  44 y.o., SEX:  male  Medical Record #: [id number]  Today's Date: 10/23/2019       Subjective    \"I didn't think about my pain at all.\"     Objective       10/23/19 1501   Functional Ability Status - Cognitive   Attention Span Remains on Task   Comprehension Follows Three Step Commands   Judgment Able to Make Independent Decisions   Functional Ability Status - Emotional    Affect Appropriate   Mood Appropriate   Behavior Appropriate   Skilled Intervention    Skilled Intervention Cognitive Leisure;Relaxation / Coping Skills   Skilled Intervention Comments Cognitive activity with intention of using distract as a pain management   Interdisciplinary Plan of Care Collaboration   IDT Collaboration with  Physical Therapist;Nursing   Patient Position at End of Therapy In Bed;Tray Table within Reach;Call Light within Reach   Collaboration Comments Transition of care from PT and nursing removed IV antibiotic.    Treatment Time   Total Time Spent (mins) 30   Procedural Tracking   Procedural Tracking Cognitive Skills Training       Assessment    Was able to perform the cognitive activity without issue and shared he was distracted from his pain. He was asked about leisure and distraction as a pain management and he said it was effective. He had been given a puzzle to do while in his room. He said that he had plans to do this tonight with his family.     Plan    Continue to provide varied leisure activities for use as a distraction.   "

## 2019-10-23 NOTE — THERAPY
"Physical Therapy   Daily Treatment     Patient Name: Richard Hubbard II  Age:  44 y.o., Sex:  male  Medical Record #: [id number]  Today's Date: 10/23/2019     Precautions  Precautions: Weight Bearing As Tolerated Right Lower Extremity, Weight Bearing As Tolerated Left Lower Extremity  Comments: R knee brace/ R knee instability    Subjective    Pt was seated on toilet upon arrival and agreeable to treatment.  Pt reported increased pain today.  \"I think we over did yesterday.\"     Objective       10/23/19 0931   Bed Mobility    Supine to Sit Stand by Assist   Sit to Supine Moderate Assist   Sit to Stand Stand by Assist   Scooting Stand by Assist   Interdisciplinary Plan of Care Collaboration   IDT Collaboration with  Nursing;Occupational Therapist;Physical Therapist   Patient Position at End of Therapy In Bed;Call Light within Reach;Tray Table within Reach;Phone within Reach   Collaboration Comments W/C information   PT Total Time Spent   PT Individual Total Time Spent (Mins) 30   PT Charge Group   PT Therapeutic Activities 2       FIM Bed/Chair/Wheelchair Transfers Score: 3 - Moderate Assistance  Bed/Chair/Wheelchair Transfers Description:  Adaptive equipment, Increased time, Supervision for safety, Verbal cueing, Set-up of equipment(Supine to sit with SBA, Sit to supine with mod A; SPT with FWW and SBA)    FIM Toilet Transfer Score:  5 - Standby Prompting/Supervision or Set-up  Toilet Transfer Description:  Adaptive equipment, Increased time, Supervision for safety, Verbal cueing, Set-up of equipment    Prior to session, RN discussed with this therapist that pt was having difficulty with sitting tolerance outside of therapy sessions.  Completed discussion with pt on current pain and sitting tolerance.  Trialed pt in 18W tilt in space w/c in order to attempt to take pressure off anterior pelvis.  Pt report decreased pain but continued pressure.     Assessment    Pt demonstrated good sequencing and safety with " transfers, but continues to be pain limited.  Pt may benefit from trial of 22W tilt in space w/c as it tilts slightly farther back than the chair trialed and also can include a head rest of increased comfort in tilted position.     Plan    OOB activity tolerance/ progressive gait training with // bars vs FWW, joint protection education, LE strength/ core strength training as tolerated.  Consider trial of leg .

## 2019-10-23 NOTE — CARE PLAN
Problem: Safety  Goal: Will remain free from injury  Outcome: PROGRESSING AS EXPECTED  Note:   Pt uses call light consistently for staff assistance. Pt has good safety awareness, no impulsivity observed.      Problem: Venous Thromboembolism (VTW)/Deep Vein Thrombosis (DVT) Prevention:  Goal: Patient will participate in Venous Thrombosis (VTE)/Deep Vein Thrombosis (DVT)Prevention Measures  Outcome: PROGRESSING AS EXPECTED  Note:   Pt receives Lovenox SC injections for DVT prophylaxis      Problem: Bowel/Gastric:  Goal: Normal bowel function is maintained or improved  Outcome: PROGRESSING AS EXPECTED  Note:   Pt is continent of bowel and reports having BMs at least every other day.     Problem: Respiratory:  Goal: Respiratory status will improve  Outcome: PROGRESSING AS EXPECTED  Note:   Pt is on room air and respirations are WNL.

## 2019-10-23 NOTE — THERAPY
"Physical Therapy   Daily Treatment     Patient Name: Richard Hubbard II  Age:  44 y.o., Sex:  male  Medical Record #: [id number]  Today's Date: 10/23/2019     Precautions  Precautions: Weight Bearing As Tolerated Left Lower Extremity, Weight Bearing As Tolerated Right Lower Extremity, Fall Risk  Comments: R knee brace/ R knee instability    Subjective    Pt in bed, reports frustration from being in constant pain.      Objective       10/23/19 1401   Precautions   Precautions Weight Bearing As Tolerated Left Lower Extremity;Weight Bearing As Tolerated Right Lower Extremity;Fall Risk   Comments R knee brace/ R knee instability   Interdisciplinary Plan of Care Collaboration   IDT Collaboration with  Recreation Therapist;Nursing   Patient Position at End of Therapy Seated  (with rec therapist in room)   Collaboration Comments Pt care passed to rec therapist. RN started IV antibiotics during PT session    PT Total Time Spent   PT Individual Total Time Spent (Mins) 60   PT Charge Group   PT Gait Training 1   PT Therapeutic Activities 3     Pt issued roho cushion and 20\" wheelchair for better fit and comfort. Discussed purpose of roho cushion vs standard foam cushion for pain control and pressure relief.     Pt donned socks seated EOB with use of sock aid, mod I. Required assist to don shoes with use of shoe horn and elastic laces.     Pt education/ discussion of pain management and participation with therapy. Emotional support provided regarding pain and frustration.     FIM Walking Score:  1 - Total Assistance  Walking Description:  Extra time, Requires incidental assist, Requires wheelchair follow(30 ft x2 with FWW and CGA, 2nd person for WC follow and IV pole management)      Assessment    Pt limited by pain/ emotion this session. Willing to participate despite. Significant R knee valgus and R foot supination during gait.     Plan    OOB activity tolerance/ progressive gait training with // bars vs FWW, joint " protection education, LE strength/ core strength training as tolerated.  Consider trial of leg . Consider wider tilt in space WC trial (needs back rest).

## 2019-10-23 NOTE — THERAPY
"Occupational Therapy  Daily Treatment     Patient Name: Richard Hubbard II  Age:  44 y.o., Sex:  male  Medical Record #: [id number]  Today's Date: 10/23/2019     Precautions  Precautions: (P) Weight Bearing As Tolerated Right Lower Extremity, Weight Bearing As Tolerated Left Lower Extremity  Comments: (P) R knee brace/ R knee instability    Safety   ADL Safety : (P) Requires Supervision for Safety  Bathroom Safety: (P) Requires Supervision for Safety  Comments: see FIM for dressing and grooming. Able to perform at a largely SBA to mod I level. However, cont to require assistance for shoes due to swelling of feet, impaired motor control, and pain with bending.     Subjective    \"  A shower would be great .\"     Objective       10/23/19 1031   Precautions   Precautions Weight Bearing As Tolerated Right Lower Extremity;Weight Bearing As Tolerated Left Lower Extremity   Comments R knee brace/ R knee instability   Safety    ADL Safety  Requires Supervision for Safety   Bathroom Safety Requires Supervision for Safety   Interdisciplinary Plan of Care Collaboration   Patient Position at End of Therapy In Bed;Call Light within Reach;Tray Table within Reach   OT Total Time Spent   OT Individual Total Time Spent (Mins) 60   OT Charge Group   OT Self Care / ADL 4     FIM Bathing Score:  5 - Standby Prompting/Supervision or Set-up  Bathing Description:       FIM Upper Body Dressin - Standby Prompting/Supervision or Set-up  Upper Body Dressing Description:  (setup/ supervision doff/ don pull over shirt )    FIM Lower Body Dressing Score:  5 - Standby Prompting/Supervsion or Set-up  Lower Body Dressing Description:  Sock aid, Reacher( doff don pants underwear and socks )    FIM Bed/Chair/Wheelchair Transfers Score: 3 - Moderate Assistance  Bed/Chair/Wheelchair Transfers Description:  ( bed to w/c supervision   w/c to bed  mod assist  ( both feet up into bed ) )    FIM Tub/Shower Transfers Score:  5 - Standby " Prompting/Supervision or Set-up  Tub/Shower Transfers Description:  Grab bar( SBA cues for technique  sit to stand )    FIM Comprehension Score:  6 - Modified Independent  Comprehension Description:  Glasses    FIM Expression Score:  7 - Independent  Expression Description:       FIM Social Interaction Score:  7 - Independent  Social Interaction Description:       FIM Problem Solving Score:  7 - Independent  Problem Solving Description:       FIM Memory Score:  7 - Independent  Memory Description:         Assessment    Improved independence with bathing tasks and shower transfer     Plan     ADL  IADL  Related mobility ,strength/endurance building incorporating joint protection and energy conservation      [Dear  ___] : Dear  [unfilled], [Consult Letter:] : I had the pleasure of evaluating your patient, [unfilled]. [Consult Closing:] : Thank you very much for allowing me to participate in the care of this patient.  If you have any questions, please do not hesitate to contact me. [FreeTextEntry3] : \par Natali HEBERTP, TARA\par Associate Chief Division of Infectious Diseases\par Associate Professor of Medicine\par [employer] [employer]  at [facility]\par [phone] (Phone)\par [phone] (Fax)\par email : [email or web address]\par  [Sincerely,] : Sincerely, [DrJoi  ___] : Dr. PONCE

## 2019-10-24 PROCEDURE — 99406 BEHAV CHNG SMOKING 3-10 MIN: CPT | Performed by: PHYSICAL MEDICINE & REHABILITATION

## 2019-10-24 PROCEDURE — 97110 THERAPEUTIC EXERCISES: CPT

## 2019-10-24 PROCEDURE — 99232 SBSQ HOSP IP/OBS MODERATE 35: CPT | Mod: 25 | Performed by: PHYSICAL MEDICINE & REHABILITATION

## 2019-10-24 PROCEDURE — 700111 HCHG RX REV CODE 636 W/ 250 OVERRIDE (IP): Performed by: PHYSICAL MEDICINE & REHABILITATION

## 2019-10-24 PROCEDURE — 700102 HCHG RX REV CODE 250 W/ 637 OVERRIDE(OP): Performed by: PHYSICAL MEDICINE & REHABILITATION

## 2019-10-24 PROCEDURE — 770010 HCHG ROOM/CARE - REHAB SEMI PRIVAT*

## 2019-10-24 PROCEDURE — 97535 SELF CARE MNGMENT TRAINING: CPT

## 2019-10-24 PROCEDURE — 97530 THERAPEUTIC ACTIVITIES: CPT

## 2019-10-24 PROCEDURE — 700105 HCHG RX REV CODE 258: Performed by: PHYSICAL MEDICINE & REHABILITATION

## 2019-10-24 PROCEDURE — 97116 GAIT TRAINING THERAPY: CPT

## 2019-10-24 PROCEDURE — 700101 HCHG RX REV CODE 250: Performed by: PHYSICAL MEDICINE & REHABILITATION

## 2019-10-24 PROCEDURE — A9270 NON-COVERED ITEM OR SERVICE: HCPCS | Performed by: PHYSICAL MEDICINE & REHABILITATION

## 2019-10-24 RX ADMIN — Medication 20 ML: at 21:12

## 2019-10-24 RX ADMIN — PREGABALIN 75 MG: 75 CAPSULE ORAL at 21:11

## 2019-10-24 RX ADMIN — CEFAZOLIN 2 G: 10 INJECTION, POWDER, FOR SOLUTION INTRAVENOUS at 22:03

## 2019-10-24 RX ADMIN — VITAMIN D, TAB 1000IU (100/BT) 2000 UNITS: 25 TAB at 08:37

## 2019-10-24 RX ADMIN — SENNOSIDES AND DOCUSATE SODIUM 2 TABLET: 8.6; 5 TABLET ORAL at 21:10

## 2019-10-24 RX ADMIN — ACETAMINOPHEN 500 MG: 500 TABLET, FILM COATED ORAL at 21:09

## 2019-10-24 RX ADMIN — NICOTINE 14 MG: 14 PATCH, EXTENDED RELEASE TRANSDERMAL at 06:00

## 2019-10-24 RX ADMIN — HYDROCODONE BITARTRATE AND ACETAMINOPHEN 1 TABLET: 10; 325 TABLET ORAL at 21:09

## 2019-10-24 RX ADMIN — MORPHINE SULFATE 15 MG: 15 TABLET, FILM COATED, EXTENDED RELEASE ORAL at 21:10

## 2019-10-24 RX ADMIN — SENNOSIDES AND DOCUSATE SODIUM 2 TABLET: 8.6; 5 TABLET ORAL at 08:38

## 2019-10-24 RX ADMIN — HYDROCODONE BITARTRATE AND ACETAMINOPHEN 1 TABLET: 10; 325 TABLET ORAL at 15:11

## 2019-10-24 RX ADMIN — OMEPRAZOLE 40 MG: 20 CAPSULE, DELAYED RELEASE ORAL at 21:10

## 2019-10-24 RX ADMIN — HYDROCODONE BITARTRATE AND ACETAMINOPHEN 1 TABLET: 10; 325 TABLET ORAL at 10:43

## 2019-10-24 RX ADMIN — HYDROCODONE BITARTRATE AND ACETAMINOPHEN 1 TABLET: 5; 325 TABLET ORAL at 21:08

## 2019-10-24 RX ADMIN — HYDROCODONE BITARTRATE AND ACETAMINOPHEN 1 TABLET: 10; 325 TABLET ORAL at 02:31

## 2019-10-24 RX ADMIN — HYDROCODONE BITARTRATE AND ACETAMINOPHEN 1 TABLET: 10; 325 TABLET ORAL at 06:17

## 2019-10-24 RX ADMIN — ENOXAPARIN SODIUM 40 MG: 100 INJECTION SUBCUTANEOUS at 08:37

## 2019-10-24 RX ADMIN — PREGABALIN 75 MG: 75 CAPSULE ORAL at 08:38

## 2019-10-24 RX ADMIN — PREDNISONE 40 MG: 20 TABLET ORAL at 08:38

## 2019-10-24 RX ADMIN — FUROSEMIDE 20 MG: 20 TABLET ORAL at 15:11

## 2019-10-24 RX ADMIN — CEFAZOLIN 2 G: 10 INJECTION, POWDER, FOR SOLUTION INTRAVENOUS at 15:13

## 2019-10-24 RX ADMIN — PREGABALIN 75 MG: 75 CAPSULE ORAL at 15:13

## 2019-10-24 RX ADMIN — MORPHINE SULFATE 15 MG: 15 TABLET, FILM COATED, EXTENDED RELEASE ORAL at 15:11

## 2019-10-24 RX ADMIN — LOSARTAN POTASSIUM 25 MG: 25 TABLET ORAL at 08:38

## 2019-10-24 RX ADMIN — OMEPRAZOLE 40 MG: 20 CAPSULE, DELAYED RELEASE ORAL at 08:37

## 2019-10-24 RX ADMIN — CEFAZOLIN 2 G: 10 INJECTION, POWDER, FOR SOLUTION INTRAVENOUS at 06:02

## 2019-10-24 RX ADMIN — ACETAMINOPHEN 500 MG: 500 TABLET, FILM COATED ORAL at 08:38

## 2019-10-24 RX ADMIN — FERROUS SULFATE TAB 325 MG (65 MG ELEMENTAL FE) 325 MG: 325 (65 FE) TAB at 08:38

## 2019-10-24 RX ADMIN — CALCIUM CITRATE 200 MG (950 MG) TABLET 950 MG: at 08:38

## 2019-10-24 RX ADMIN — ACETAMINOPHEN 500 MG: 500 TABLET, FILM COATED ORAL at 15:11

## 2019-10-24 RX ADMIN — MORPHINE SULFATE 15 MG: 15 TABLET, FILM COATED, EXTENDED RELEASE ORAL at 08:38

## 2019-10-24 RX ADMIN — Medication 20 ML: at 08:39

## 2019-10-24 ASSESSMENT — PAIN SCALES - WONG BAKER: WONGBAKER_NUMERICALRESPONSE: HURTS A LITTLE MORE

## 2019-10-24 NOTE — PROGRESS NOTES
"Rehab Progress Note     Encounter Date: 10/24/2019    CC: left pelvic fracture, pain, RA    Interval Events (Subjective)  Patient sitting up in bathroom shaving. He reports he is doing very well. He reports he has come to terms with the fact that he will always have pain and so is trying to just deal with the pain. Discussed about steroids taper and he would like to wait another day.  Patient continues to have borderline low SBP.  Denies dizziness. Denies SOB.     IDT Team Meeting 10/22/2019  DC/Disposition:  11/1/19    Objective:  VITAL SIGNS: /70   Pulse 88   Temp 36.6 °C (97.9 °F) (Oral)   Resp 18   Ht 1.803 m (5' 11\") Comment: per patient  Wt 91.6 kg (201 lb 15.1 oz)   SpO2 98%   BMI 28.17 kg/m²   Gen: NAD  Psych: Mood and affect appropriate  CV: RRR, no edema  Resp: CTAB, no upper airway sounds  Abd: NTND  Neuro: AOx4, pushing wheelchair BUE  Skin: Rash resolved around groin and feet  Unchanged from 10/23/19    No results found for this or any previous visit (from the past 72 hour(s)).    Current Facility-Administered Medications   Medication Frequency   • HYDROcodone-acetaminophen (NORCO) 5-325 MG per tablet 1 Tab Q3HRS PRN   • HYDROcodone/acetaminophen (NORCO)  MG per tablet 1 Tab Q3HRS PRN   • acetaminophen (TYLENOL) tablet 500 mg TID   • pregabalin (LYRICA) capsule 75 mg TID   • vitamin D (cholecalciferol) tablet 2,000 Units DAILY   • normal saline PF 20 mL Q12HRS   • Respiratory Care per Protocol Continuous RT   • tramadol (ULTRAM) 50 MG tablet 50 mg Q4HRS PRN   • hydrALAZINE (APRESOLINE) tablet 25 mg Q8HRS PRN   • acetaminophen (TYLENOL) tablet 650 mg Q4HRS PRN   • senna-docusate (PERICOLACE or SENOKOT S) 8.6-50 MG per tablet 2 Tab BID    And   • polyethylene glycol/lytes (MIRALAX) PACKET 1 Packet QDAY PRN    And   • magnesium hydroxide (MILK OF MAGNESIA) suspension 30 mL QDAY PRN    And   • bisacodyl (DULCOLAX) suppository 10 mg QDAY PRN   • artificial tears ophthalmic solution 1 Drop " PRN   • benzocaine-menthol (CEPACOL) lozenge 1 Lozenge Q2HRS PRN   • mag hydrox-al hydrox-simeth (MAALOX PLUS ES or MYLANTA DS) suspension 20 mL Q2HRS PRN   • ondansetron (ZOFRAN ODT) dispertab 4 mg 4X/DAY PRN    Or   • ondansetron (ZOFRAN) syringe/vial injection 4 mg 4X/DAY PRN   • traZODone (DESYREL) tablet 50 mg QHS PRN   • sodium chloride (OCEAN) 0.65 % nasal spray 2 Spray PRN   • nicotine (NICODERM) 14 MG/24HR 14 mg Daily-0600    And   • nicotine polacrilex (NICORETTE) 2 MG piece 2 mg Q HOUR PRN   • calcium citrate (CALCITRATE) tablet 950 mg DAILY   • ceFAZolin (ANCEF) 2 g in  mL IVPB Q8HRS   • enoxaparin (LOVENOX) inj 40 mg DAILY   • ferrous sulfate tablet 325 mg DAILY   • furosemide (LASIX) tablet 20 mg BID DIURETIC   • losartan (COZAAR) tablet 25 mg DAILY   • morphine ER (MS CONTIN) tablet 15 mg TID   • omeprazole (PRILOSEC) capsule 40 mg BID   • predniSONE (DELTASONE) tablet 40 mg DAILY   • simethicone (MYLICON) chewable tab 80 mg TID PRN       Orders Placed This Encounter   Procedures   • Diet Order Regular     Standing Status:   Standing     Number of Occurrences:   1     Order Specific Question:   Diet:     Answer:   Regular [1]       Assessment:  Active Hospital Problems    Diagnosis   • *Pubic ramus fracture (HCC)   • Rheumatoid arthritis (HCC)   • Iron deficiency anemia, unspecified iron deficiency anemia type   • MONICA (obstructive sleep apnea)   • Depression       Medical Decision Making and Plan:  Left pelvic fracture - managed conservatively, found to have abscess in thigh status post drainage.   -PT and OT for mobility and ADLs  -Follow-up with Orthopedics     Pain - Patient with left hip and right knee. Was supposed to get right knee replacement prior to falls and hospitalization.  -On Morphine 15 mg BID, Lyrica 25 mg TID, and scheduled Tylenol. With Norco PRN 5 to 10 mg - trial instead of Oxycodone. Discussed risks and benefits.   -Increase Lyrica to 75 mg TID     Left thigh  mass/osteomyelitis - s/p drainage with MSSA growing from cultures.    -Continue Ancef 2 g q8h until 11/22/19.  Recommending weekly CBC and CMP - schedule for Fridays.     Fungal rash - Patient with diffuse fungal rash. Patient s/p fluconazole for 10 days. Per pharmacy no miconazole cream currently. Will switch to clotrimazole cream BID  -Discontinue cream as resolved.      HTN/AS - Patient on Losartan 25 mg daily, Lasix 20 mg BID   -Held due to low SBP, adjust parameters.      Anemia - Patient on Fe supplement on transfer. Check AM CBC - 8.3, stable. Check Iron panel in AM     RA - Patient previously on DMARD, now on hold while has active osteomyelitis. On Predinsone 40 mg daily, continue to discuss reduce     Nicotine dependence - On patch on transfer. Will need teaching.   -Reduce Nicotine patch. Counseling performed on 10/24/19      GERD - History of PUD. Continue Omeprazole.     Vitamin D deficiency - 1000 U on transfer. Check level - 20.  Increase to 2000 U daily     DVT ppx - Patient on Lovenox on transfer. Walking 50 feet     Total time:  25 minutes.  I spent greater than 50% of the time for patient care, counseling, and coordination on this date, including unit/floor time, and face-to-face time with the patient as per interval events and assessment and plan above. Topics discussed included decreasing prednisone discussion, check iron panel, and lab check in AM.    Keaton Pisano M.D.    I had a 5 minute discussion with patient on 10/25/2019 about smoking cessation.  Discussed that smoking is associated with respiratory, cardiovascular, oncologic, and neurologic illnesses.  Patient was provided advice and counseling on different methods of smoking cessation as well as provided supportive listening for psychologic aspect of addiction.

## 2019-10-24 NOTE — DISCHARGE PLANNING
Met w/ patient to f/u IDT team recommendations from conference. Recommending w/ chair for discharge. Updated regarding MD f/u post up appt w/ ROWAN Dr. Olivares 10.29.19, we will transport to/from appointment. Patient requests I contact wife to discuss MD f/u appt & questions or discussion regarding DCP. Message left on wife's phone today requesting call back. PPO for IV abx is [employer].

## 2019-10-24 NOTE — CARE PLAN
Problem: Safety  Goal: Will remain free from injury  Outcome: PROGRESSING AS EXPECTED  Note:   Pt uses call light consistently for staff assistance. Pt has good safety awareness, no impulsivity observed.      Problem: Bowel/Gastric:  Goal: Normal bowel function is maintained or improved  Outcome: PROGRESSING AS EXPECTED  Note:   Pt is continent of bowel and reports having daily BMs     Problem: Respiratory:  Goal: Respiratory status will improve  Outcome: PROGRESSING AS EXPECTED  Note:   Pt is on room air and respirations are WNL.

## 2019-10-24 NOTE — THERAPY
"Occupational Therapy  Daily Treatment     Patient Name: Richard Hubbard II  Age:  44 y.o., Sex:  male  Medical Record #: [id number]  Today's Date: 10/24/2019     Precautions  Precautions: (P) Weight Bearing As Tolerated Right Lower Extremity, Weight Bearing As Tolerated Left Lower Extremity  Comments: (P) R knee brace/ R knee instability    Safety   ADL Safety : Requires Supervision for Safety  Bathroom Safety: Requires Supervision for Safety  Comments: see FIM for dressing and grooming. Able to perform at a largely SBA to mod I level. However, cont to require assistance for shoes due to swelling of feet, impaired motor control, and pain with bending.     Subjective    \"  I felt that !\" referring to pelvic pain using leg  for sit to supine.      Objective       10/24/19 1101   Precautions   Precautions Weight Bearing As Tolerated Right Lower Extremity;Weight Bearing As Tolerated Left Lower Extremity   Comments R knee brace/ R knee instability   Interdisciplinary Plan of Care Collaboration   Patient Position at End of Therapy In Bed;Call Light within Reach;Tray Table within Reach   OT Total Time Spent   OT Individual Total Time Spent (Mins) 30   OT Charge Group   OT Self Care / ADL 3       FIM Grooming Score:  7 - Independent  Grooming Description:  ( seated at sink oral care  comb hair and shave )    FIM Bed/Chair/Wheelchair Transfers Score: 4 - Minimal Assistance  Bed/Chair/Wheelchair Transfers Description:  ( supine to sit using leg liftor.  edge of bed to w/c supervision with FWW.   supervision w/c to edge of bed with FWW   Min asisst using leg    sit to supine )      Assessment    Demonstrates improving bed mobility  With use of leg      Plan     ADL  IADL  Related mobility strength/endurance buliding    Joint protection  Energy conservation     "

## 2019-10-24 NOTE — PROGRESS NOTES
"Rehab Progress Note     Encounter Date: 10/23/2019    CC: left pelvic fracture, pain, RA    Interval Events (Subjective)  Patient sitting up in room. He reports the hydrocodone seems to be working better. Denies NVD. Denies SOB. Patient had low SBP and lasix was held.      IDT Team Meeting 10/22/2019  DC/Disposition:  11/1/19    Objective:  VITAL SIGNS: /70   Pulse 88   Temp 36.6 °C (97.9 °F) (Oral)   Resp 18   Ht 1.803 m (5' 11\") Comment: per patient  Wt 91.6 kg (201 lb 15.1 oz)   SpO2 98%   BMI 28.17 kg/m²   Gen: NAD  Psych: Mood and affect appropriate  CV: RRR, no edema  Resp: CTAB, no upper airway sounds  Abd: NTND  Neuro: AOx4, pushing wheelchair BUE  Skin: Rash resolved around groin and feet    No results found for this or any previous visit (from the past 72 hour(s)).    Current Facility-Administered Medications   Medication Frequency   • HYDROcodone-acetaminophen (NORCO) 5-325 MG per tablet 1 Tab Q3HRS PRN   • HYDROcodone/acetaminophen (NORCO)  MG per tablet 1 Tab Q3HRS PRN   • acetaminophen (TYLENOL) tablet 500 mg TID   • pregabalin (LYRICA) capsule 75 mg TID   • vitamin D (cholecalciferol) tablet 2,000 Units DAILY   • normal saline PF 20 mL Q12HRS   • Respiratory Care per Protocol Continuous RT   • tramadol (ULTRAM) 50 MG tablet 50 mg Q4HRS PRN   • hydrALAZINE (APRESOLINE) tablet 25 mg Q8HRS PRN   • acetaminophen (TYLENOL) tablet 650 mg Q4HRS PRN   • senna-docusate (PERICOLACE or SENOKOT S) 8.6-50 MG per tablet 2 Tab BID    And   • polyethylene glycol/lytes (MIRALAX) PACKET 1 Packet QDAY PRN    And   • magnesium hydroxide (MILK OF MAGNESIA) suspension 30 mL QDAY PRN    And   • bisacodyl (DULCOLAX) suppository 10 mg QDAY PRN   • artificial tears ophthalmic solution 1 Drop PRN   • benzocaine-menthol (CEPACOL) lozenge 1 Lozenge Q2HRS PRN   • mag hydrox-al hydrox-simeth (MAALOX PLUS ES or MYLANTA DS) suspension 20 mL Q2HRS PRN   • ondansetron (ZOFRAN ODT) dispertab 4 mg 4X/DAY PRN    Or   • " ondansetron (ZOFRAN) syringe/vial injection 4 mg 4X/DAY PRN   • traZODone (DESYREL) tablet 50 mg QHS PRN   • sodium chloride (OCEAN) 0.65 % nasal spray 2 Spray PRN   • nicotine (NICODERM) 14 MG/24HR 14 mg Daily-0600    And   • nicotine polacrilex (NICORETTE) 2 MG piece 2 mg Q HOUR PRN   • calcium citrate (CALCITRATE) tablet 950 mg DAILY   • ceFAZolin (ANCEF) 2 g in  mL IVPB Q8HRS   • enoxaparin (LOVENOX) inj 40 mg DAILY   • ferrous sulfate tablet 325 mg DAILY   • furosemide (LASIX) tablet 20 mg BID DIURETIC   • losartan (COZAAR) tablet 25 mg DAILY   • morphine ER (MS CONTIN) tablet 15 mg TID   • omeprazole (PRILOSEC) capsule 40 mg BID   • predniSONE (DELTASONE) tablet 40 mg DAILY   • simethicone (MYLICON) chewable tab 80 mg TID PRN       Orders Placed This Encounter   Procedures   • Diet Order Regular     Standing Status:   Standing     Number of Occurrences:   1     Order Specific Question:   Diet:     Answer:   Regular [1]       Assessment:  Active Hospital Problems    Diagnosis   • *Pubic ramus fracture (HCC)   • Rheumatoid arthritis (HCC)   • Iron deficiency anemia, unspecified iron deficiency anemia type   • MONICA (obstructive sleep apnea)   • Depression       Medical Decision Making and Plan:  Left pelvic fracture - managed conservatively, found to have abscess in thigh status post drainage.   -PT and OT for mobility and ADLs  -Follow-up with Orthopedics     Pain - Patient with left hip and right knee. Was supposed to get right knee replacement prior to falls and hospitalization.  -On Morphine 15 mg BID, Lyrica 25 mg TID, and scheduled Tylenol. With Norco PRN 5 to 10 mg - trial instead of Oxycodone. Discussed risks and benefits.   -Increase Lyrica to 75 mg TID     Left thigh mass/osteomyelitis - s/p drainage with MSSA growing from cultures.    -Continue Ancef 2 g q8h until 11/22/19.  Recommending weekly CBC and CMP     Fungal rash - Patient with diffuse fungal rash. Patient s/p fluconazole for 10 days. Per  pharmacy no miconazole cream currently. Will switch to clotrimazole cream BID  -Discontinue cream as resolved.      HTN/AS - Patient on Losartan 25 mg daily, Lasix 20 mg BID   -Held due to low SBP, adjust parameters.      Anemia - Patient on Fe supplement on transfer. Check AM CBC - 8.3, stable. Consider Iron panel check      RA - Patient previously on DMARD, now on hold while has active osteomyelitis. On Predinsone 40 mg daily     Nicotine dependence - On patch on transfer. Will need teaching.      GERD - History of PUD. Continue Omeprazole.     Vitamin D deficiency - 1000 U on transfer. Check level - 20.  Increase to 2000 U daily     DVT ppx - Patient on Lovenox on transfer.      Total time:  25 minutes.  I spent greater than 50% of the time for patient care, counseling, and coordination on this date, including unit/floor time, and face-to-face time with the patient as per interval events and assessment and plan above. Topics discussed included pain control, adjust SBP medication parameters, and discussed wean of prednisone.     Keaton Pisano M.D.

## 2019-10-24 NOTE — THERAPY
"Occupational Therapy  Daily Treatment     Patient Name: Richard Hubbard II  Age:  44 y.o., Sex:  male  Medical Record #: [id number]  Today's Date: 10/24/2019     Precautions  Precautions: (P) Weight Bearing As Tolerated Right Lower Extremity, Weight Bearing As Tolerated Left Lower Extremity  Comments: (P) R knee brace/ R knee instability    Safety   ADL Safety : Requires Supervision for Safety  Bathroom Safety: Requires Supervision for Safety  Comments: see FIM for dressing and grooming. Able to perform at a largely SBA to mod I level. However, cont to require assistance for shoes due to swelling of feet, impaired motor control, and pain with bending.     Subjective    \" That gets your heart rate up .\"  Referring to lat pulls      Objective       10/24/19 1231   Precautions   Precautions Weight Bearing As Tolerated Right Lower Extremity;Weight Bearing As Tolerated Left Lower Extremity   Comments R knee brace/ R knee instability   Sitting Upper Body Exercises   Lat Pull 1 set of 10;3 sets of 10;Bilateral  (weighted pulley 20lbs )   Upper Extremity Bike Level 3 Resistance  (x 5 minutes x  2)   Other Exercise biceps/ triceps strengthening   Tripp   x 10 reps x 3  facing and backed in with 35lbs  backed in x 5 reps  30lbs    Interdisciplinary Plan of Care Collaboration   Patient Position at End of Therapy In Bed;Call Light within Reach;Tray Table within Reach   OT Total Time Spent   OT Individual Total Time Spent (Mins) 60   OT Charge Group   OT Self Care / ADL 1   OT Therapy Activity 1   OT Therapeutic Exercise  2       FIM Lower Body Dressing Score:  5 - Standby Prompting/Supervsion or Set-up  Lower Body Dressing Description:  Shoe horn( doff / don shoes )    FIM Bed/Chair/Wheelchair Transfers Score: 5 - Standby Prompting/Supervision or Set-up  Bed/Chair/Wheelchair Transfers Description:  ( cues for technique using leg  )     self propel. W/c room to gym  Supervision  Approximately 75 feet.     "     Assessment     continues with decreased endurance / strength motivated for functional independence.     Plan  ADL  IADL  Related mobility strength/endurance buliding    Joint protection  Energy conservation

## 2019-10-24 NOTE — PROGRESS NOTES
"      Spiritual Care Note    Patient Information     Patient's Name: Richard Hubbard II   MRN: [id number]    YOB: 1975   Age and Gender: 44 y.o. male   Service Area: Rehab hospital   Room (and Bed): Room/bed info not found   Ethnicity or Nationality:     Primary Language: English   Yazidism/Spiritual preference: Restorationist   Place of Residence: [place], [place]   Family/Friends/Others Present: No   Clinical Team Present: No   Medical Diagnosis(-es)/Procedure(s): Hip injury   Code Status: Full Code    Date of Admission: (Not on file)   Length of Stay: 0 days        Spiritual Care Provider Information:  Name of Spiritual Care Provider: Ivette Go  Title of Spiritual Care Provider: Associate   Phone Number: [phone]  E-mail: [email or web address]  Total time : 15 minutes    Spiritual Screen Results:    Gen Nursing  Spiritual Screen  Would you like to receive a visit from our Spiritual Care team or your own Protestant or spiritual leader?: No     Palliative Care         Encounter/Request Information  Encounter/Request Type   Visited With: Patient  Nature of the Visit: Follow-up  Continue Visiting: Yes  Next Follow-up Date: 10/24/19  General Visit: Yes  Referral From/ Origin of Request: Verbal patient    Religous Needs/Values  Yazidism Needs Visit  Yazidism Needs: Prayer  Ritual Needs Visit  Ritual Needs: [place]    Spiritual Assessment     Spiritual Care Encounters    Observations/Symptoms: Accepting, Thankfulness    Interaction/Conversation: Pt was very happy to see  again.  \"It's really nice of you to come by.\"  Pt hopes to go home on 11/1.  He requested prayer and blessing and thanked the .    Assessment: Need    Need: Seeking Spiritual Assistance and Support    Distress: Coping, Loneliness    Interventions: Prayer, blessing, conversation.    Outcomes: Connectedness with the Holy/with God    Plan: Visit Upon Request    Notes:            "

## 2019-10-24 NOTE — DOCUMENTATION QUERY
[facility]                                                                       Query Response Note      PATIENT:               LESLIE BADILLO  ACCT #:                  [id number]  MRN:                     [id number]  :                      1975  ADMIT DATE:       2019 11:55 PM  DISCH DATE:        10/17/2019 11:00 AM  RESPONDING  PROVIDER #:        [id number]           QUERY TEXT:    When both osteopenia and a trauma occur with a resulting fracture, coding clinic directs the  to query the physician for clarification of the type of fracture. Based on clinical findings, risk factors and treatment, can this fracture be further specified as:    NOTE:  If an appropriate response is not listed below, please respond with a new note.      The patient's Clinical Indicators include:  per H&P:  Pubic ramus fracture (HCC)  Assessment & Plan  -Status post mechanical fall a little over a month ago.  -Observation status.  -He is now requiring IV pain control.    19 CT lumbar spine:  FINDINGS:  The bones are markedly osteopenic. There is a minimal compression deformity of the superior endplate of the L2 body, which is old. There is no evidence of acute fracture. There is multilevel interspace and facet arthropathy.    10/01/19 progress note from Dr Varma:  Pubic ramus fracture (HCC)  Assessment & Plan  S/p mechanical GLF  MS Continue 15mg BID continue with oxycodone,   Rehab referral placed  Continue PT/OT  Start calcium and vit D for thin bones due to chronic prednisone use for rheumatoid arthritis  Options provided:   -- Pubic ramus fracture secondary to osteopenia   -- Traumatic Pubic ramus fracture   -- Unable to determine      Query created by: Natalie Vaughn on 10/23/2019 4:48 PM    RESPONSE TEXT:    Traumatic Pubic ramus fracture          Electronically signed by:  ANA EPPS 10/24/2019 3:35 PM

## 2019-10-24 NOTE — THERAPY
"Physical Therapy   Daily Treatment     Patient Name: Richard Hubbard II  Age:  44 y.o., Sex:  male  Medical Record #: [id number]  Today's Date: 10/24/2019     Precautions  Precautions: (P) Weight Bearing As Tolerated Right Lower Extremity, Weight Bearing As Tolerated Left Lower Extremity  Comments: (P) R knee brace/ R knee instability    Subjective    Pt resting in bed, willing to participate.     Objective       10/24/19 1431   Precautions   Precautions Weight Bearing As Tolerated Right Lower Extremity;Weight Bearing As Tolerated Left Lower Extremity   Comments R knee brace/ R knee instability   PT Total Time Spent   PT Individual Total Time Spent (Mins) 30   PT Charge Group   PT Gait Training 1   PT Therapeutic Activities 1     Pt able to self propel wc to/from therapy modified independent.  Initiation of stair training with // bars and min A for 4\", 6\" and 8\" step stool.     Assessment    Pt reports increased L groin pain with higher rise step training, request that pt provide a picture of entry to home with step height measurements.     Plan    OOB activity tolerance/ progressive gait training with Arjo platform walker vs FWW, joint protection education, LE strength/ core strength training as tolerated    "

## 2019-10-24 NOTE — THERAPY
Physical Therapy   Daily Treatment     Patient Name: Richard Hubbard II  Age:  44 y.o., Sex:  male  Medical Record #: [id number]  Today's Date: 10/24/2019     Precautions  Precautions: Weight Bearing As Tolerated Right Lower Extremity, Weight Bearing As Tolerated Left Lower Extremity  Comments: R knee brace/ R knee instability    Subjective    Pt sitting edge of bed, willing to participate     Objective       10/24/19 0931   Precautions   Precautions Weight Bearing As Tolerated Right Lower Extremity;Weight Bearing As Tolerated Left Lower Extremity   Comments R knee brace/ R knee instability   Sitting Lower Body Exercises   Ankle Pumps 2 sets of 15   Long Arc Quad 2 sets of 15   Other Exercises AAROM for LAQ's for pain control/ 3 sec JONATHAN holds   Bed Mobility    Supine to Sit Modified Independent   Sit to Supine Moderate Assist   Sit to Stand Stand by Assist   Neuro-Muscular Treatments   Comments core strength/ posture and standing tolerance with upper body support at Arjo platform x 5 minutes.    PT Total Time Spent   PT Individual Total Time Spent (Mins) 60   PT Charge Group   PT Gait Training 1   PT Therapeutic Exercise 2   PT Therapeutic Activities 1       FIM Bed/Chair/Wheelchair Transfers Score: 3 - Moderate Assistance  Bed/Chair/Wheelchair Transfers Description:  Adaptive equipment, Increased time, Assist with two limbs, Requires lift(supine>sit modified independent/ sit<>stand with SBA and set-up with FWW, mod A for sit>supine to lift BLE's onto bed)    FIM Walking Score:  1 - Total Assistance  Walking Description:  (CGA with Arjo platform walker for upper body support x 50 ft but requires 2 short standing rests)    FIM Wheelchair Score:  2 - Max Assistance  Wheelchair Description:  (SPV/ set-up x 75 ft with increased time)    FIM Stairs Score:  0 - Not tested,unsafe activity        Assessment    Pt remains limited by L groin pain, R knee pain and overall debility/ RA    Plan    OOB activity  tolerance/ progressive gait training with Arjo platform walker vs FWW, joint protection education, LE strength/ core strength training as tolerated.  Consider trial of leg .

## 2019-10-24 NOTE — CARE PLAN
Problem: Safety  Goal: Will remain free from injury  Outcome: PROGRESSING AS EXPECTED  Intervention: Educate patient and significant other/support system about adaptive mobility strategies and safe transfers  Note:   Patient uses call light consistently and appropriately this shift.  Waits for assistance when needed and does not attempt self transfer.  Able to verbalize needs.  Will continue to monitor.       Problem: Pain Management  Goal: Pain level will decrease to patient's comfort goal  Outcome: PROGRESSING AS EXPECTED  Note:   Pt requesting pain medication PRN despite schedule medications. Pt not tolerating wc and will only get oob for therapies and as soon as therapies are over he requests to get back in bed and refuses to stay up for meals. Will continue to work with therapies on work arounds to provide more comfort when he is sitting up.

## 2019-10-25 LAB
ALBUMIN SERPL BCP-MCNC: 3 G/DL (ref 3.2–4.9)
ALBUMIN/GLOB SERPL: 1.1 G/DL
ALP SERPL-CCNC: 89 U/L (ref 30–99)
ALT SERPL-CCNC: 10 U/L (ref 2–50)
ANION GAP SERPL CALC-SCNC: 7 MMOL/L (ref 0–11.9)
ANISOCYTOSIS BLD QL SMEAR: ABNORMAL
AST SERPL-CCNC: 13 U/L (ref 12–45)
BASOPHILS # BLD AUTO: 0.9 % (ref 0–1.8)
BASOPHILS # BLD: 0.1 K/UL (ref 0–0.12)
BILIRUB SERPL-MCNC: 0.2 MG/DL (ref 0.1–1.5)
BUN SERPL-MCNC: 15 MG/DL (ref 8–22)
CALCIUM SERPL-MCNC: 8.3 MG/DL (ref 8.5–10.5)
CHLORIDE SERPL-SCNC: 108 MMOL/L (ref 96–112)
CO2 SERPL-SCNC: 26 MMOL/L (ref 20–33)
CREAT SERPL-MCNC: 0.61 MG/DL (ref 0.5–1.4)
EOSINOPHIL # BLD AUTO: 0.09 K/UL (ref 0–0.51)
EOSINOPHIL NFR BLD: 0.8 % (ref 0–6.9)
ERYTHROCYTE [DISTWIDTH] IN BLOOD BY AUTOMATED COUNT: 73 FL (ref 35.9–50)
GLOBULIN SER CALC-MCNC: 2.8 G/DL (ref 1.9–3.5)
GLUCOSE SERPL-MCNC: 86 MG/DL (ref 65–99)
HCT VFR BLD AUTO: 31.1 % (ref 42–52)
HGB BLD-MCNC: 9.3 G/DL (ref 14–18)
HGB RETIC QN AUTO: 32.6 PG/CELL (ref 29–35)
HYPOCHROMIA BLD QL SMEAR: ABNORMAL
IMM RETICS NFR: 26.8 % (ref 9.3–17.4)
IRON SATN MFR SERPL: 17 % (ref 15–55)
IRON SERPL-MCNC: 52 UG/DL (ref 50–180)
LYMPHOCYTES # BLD AUTO: 3.05 K/UL (ref 1–4.8)
LYMPHOCYTES NFR BLD: 26.5 % (ref 22–41)
MACROCYTES BLD QL SMEAR: ABNORMAL
MANUAL DIFF BLD: NORMAL
MCH RBC QN AUTO: 28.3 PG (ref 27–33)
MCHC RBC AUTO-ENTMCNC: 29.9 G/DL (ref 33.7–35.3)
MCV RBC AUTO: 94.5 FL (ref 81.4–97.8)
MICROCYTES BLD QL SMEAR: ABNORMAL
MONOCYTES # BLD AUTO: 0.39 K/UL (ref 0–0.85)
MONOCYTES NFR BLD AUTO: 3.4 % (ref 0–13.4)
MORPHOLOGY BLD-IMP: NORMAL
NEUTROPHILS # BLD AUTO: 7.87 K/UL (ref 1.82–7.42)
NEUTROPHILS NFR BLD: 68.4 % (ref 44–72)
NRBC # BLD AUTO: 0 K/UL
NRBC BLD-RTO: 0 /100 WBC
PLATELET # BLD AUTO: 276 K/UL (ref 164–446)
PLATELET BLD QL SMEAR: NORMAL
PMV BLD AUTO: 9.2 FL (ref 9–12.9)
POLYCHROMASIA BLD QL SMEAR: NORMAL
POTASSIUM SERPL-SCNC: 3.7 MMOL/L (ref 3.6–5.5)
PROT SERPL-MCNC: 5.8 G/DL (ref 6–8.2)
RBC # BLD AUTO: 3.29 M/UL (ref 4.7–6.1)
RBC BLD AUTO: PRESENT
RETICS # AUTO: 0.15 M/UL (ref 0.04–0.06)
RETICS/RBC NFR: 4.5 % (ref 0.8–2.1)
SODIUM SERPL-SCNC: 141 MMOL/L (ref 135–145)
TIBC SERPL-MCNC: 309 UG/DL (ref 250–450)
WBC # BLD AUTO: 11.5 K/UL (ref 4.8–10.8)

## 2019-10-25 PROCEDURE — 770010 HCHG ROOM/CARE - REHAB SEMI PRIVAT*

## 2019-10-25 PROCEDURE — 97116 GAIT TRAINING THERAPY: CPT

## 2019-10-25 PROCEDURE — A9270 NON-COVERED ITEM OR SERVICE: HCPCS | Performed by: PHYSICAL MEDICINE & REHABILITATION

## 2019-10-25 PROCEDURE — 80053 COMPREHEN METABOLIC PANEL: CPT

## 2019-10-25 PROCEDURE — 85046 RETICYTE/HGB CONCENTRATE: CPT

## 2019-10-25 PROCEDURE — 700111 HCHG RX REV CODE 636 W/ 250 OVERRIDE (IP): Performed by: PHYSICAL MEDICINE & REHABILITATION

## 2019-10-25 PROCEDURE — 99232 SBSQ HOSP IP/OBS MODERATE 35: CPT | Performed by: PHYSICAL MEDICINE & REHABILITATION

## 2019-10-25 PROCEDURE — 83540 ASSAY OF IRON: CPT

## 2019-10-25 PROCEDURE — 85027 COMPLETE CBC AUTOMATED: CPT

## 2019-10-25 PROCEDURE — 700105 HCHG RX REV CODE 258: Performed by: PHYSICAL MEDICINE & REHABILITATION

## 2019-10-25 PROCEDURE — 97110 THERAPEUTIC EXERCISES: CPT

## 2019-10-25 PROCEDURE — 97530 THERAPEUTIC ACTIVITIES: CPT

## 2019-10-25 PROCEDURE — 97535 SELF CARE MNGMENT TRAINING: CPT

## 2019-10-25 PROCEDURE — 700102 HCHG RX REV CODE 250 W/ 637 OVERRIDE(OP): Performed by: PHYSICAL MEDICINE & REHABILITATION

## 2019-10-25 PROCEDURE — 85007 BL SMEAR W/DIFF WBC COUNT: CPT

## 2019-10-25 PROCEDURE — 700101 HCHG RX REV CODE 250: Performed by: PHYSICAL MEDICINE & REHABILITATION

## 2019-10-25 PROCEDURE — 83550 IRON BINDING TEST: CPT

## 2019-10-25 RX ORDER — PREDNISONE 20 MG/1
30 TABLET ORAL DAILY
Status: DISCONTINUED | OUTPATIENT
Start: 2019-10-26 | End: 2019-10-28

## 2019-10-25 RX ADMIN — HYDROCODONE BITARTRATE AND ACETAMINOPHEN 1 TABLET: 5; 325 TABLET ORAL at 04:11

## 2019-10-25 RX ADMIN — PREGABALIN 75 MG: 75 CAPSULE ORAL at 08:46

## 2019-10-25 RX ADMIN — ACETAMINOPHEN 500 MG: 500 TABLET, FILM COATED ORAL at 15:19

## 2019-10-25 RX ADMIN — MORPHINE SULFATE 15 MG: 15 TABLET, FILM COATED, EXTENDED RELEASE ORAL at 20:09

## 2019-10-25 RX ADMIN — SENNOSIDES AND DOCUSATE SODIUM 2 TABLET: 8.6; 5 TABLET ORAL at 20:09

## 2019-10-25 RX ADMIN — PREGABALIN 75 MG: 75 CAPSULE ORAL at 15:18

## 2019-10-25 RX ADMIN — FUROSEMIDE 20 MG: 20 TABLET ORAL at 05:38

## 2019-10-25 RX ADMIN — HYDROCODONE BITARTRATE AND ACETAMINOPHEN 1 TABLET: 10; 325 TABLET ORAL at 02:52

## 2019-10-25 RX ADMIN — HYDROCODONE BITARTRATE AND ACETAMINOPHEN 1 TABLET: 10; 325 TABLET ORAL at 08:53

## 2019-10-25 RX ADMIN — Medication 20 ML: at 08:51

## 2019-10-25 RX ADMIN — MORPHINE SULFATE 15 MG: 15 TABLET, FILM COATED, EXTENDED RELEASE ORAL at 08:47

## 2019-10-25 RX ADMIN — Medication 20 ML: at 21:00

## 2019-10-25 RX ADMIN — MORPHINE SULFATE 15 MG: 15 TABLET, FILM COATED, EXTENDED RELEASE ORAL at 15:18

## 2019-10-25 RX ADMIN — CEFAZOLIN 2 G: 10 INJECTION, POWDER, FOR SOLUTION INTRAVENOUS at 21:54

## 2019-10-25 RX ADMIN — ACETAMINOPHEN 500 MG: 500 TABLET, FILM COATED ORAL at 20:09

## 2019-10-25 RX ADMIN — FUROSEMIDE 20 MG: 20 TABLET ORAL at 17:17

## 2019-10-25 RX ADMIN — NICOTINE 14 MG: 14 PATCH, EXTENDED RELEASE TRANSDERMAL at 05:39

## 2019-10-25 RX ADMIN — LOSARTAN POTASSIUM 25 MG: 25 TABLET ORAL at 08:46

## 2019-10-25 RX ADMIN — SENNOSIDES AND DOCUSATE SODIUM 2 TABLET: 8.6; 5 TABLET ORAL at 08:46

## 2019-10-25 RX ADMIN — HYDROCODONE BITARTRATE AND ACETAMINOPHEN 1 TABLET: 10; 325 TABLET ORAL at 20:35

## 2019-10-25 RX ADMIN — HYDROCODONE BITARTRATE AND ACETAMINOPHEN 1 TABLET: 5; 325 TABLET ORAL at 20:35

## 2019-10-25 RX ADMIN — CEFAZOLIN 2 G: 10 INJECTION, POWDER, FOR SOLUTION INTRAVENOUS at 14:14

## 2019-10-25 RX ADMIN — ACETAMINOPHEN 500 MG: 500 TABLET, FILM COATED ORAL at 08:46

## 2019-10-25 RX ADMIN — FERROUS SULFATE TAB 325 MG (65 MG ELEMENTAL FE) 325 MG: 325 (65 FE) TAB at 08:47

## 2019-10-25 RX ADMIN — OMEPRAZOLE 40 MG: 20 CAPSULE, DELAYED RELEASE ORAL at 08:46

## 2019-10-25 RX ADMIN — ENOXAPARIN SODIUM 40 MG: 100 INJECTION SUBCUTANEOUS at 08:48

## 2019-10-25 RX ADMIN — HYDROCODONE BITARTRATE AND ACETAMINOPHEN 1 TABLET: 10; 325 TABLET ORAL at 13:31

## 2019-10-25 RX ADMIN — CALCIUM CITRATE 200 MG (950 MG) TABLET 950 MG: at 08:53

## 2019-10-25 RX ADMIN — PREGABALIN 75 MG: 75 CAPSULE ORAL at 20:09

## 2019-10-25 RX ADMIN — CEFAZOLIN 2 G: 10 INJECTION, POWDER, FOR SOLUTION INTRAVENOUS at 05:30

## 2019-10-25 RX ADMIN — VITAMIN D, TAB 1000IU (100/BT) 2000 UNITS: 25 TAB at 08:46

## 2019-10-25 RX ADMIN — PREDNISONE 40 MG: 20 TABLET ORAL at 08:46

## 2019-10-25 RX ADMIN — HYDROCODONE BITARTRATE AND ACETAMINOPHEN 1 TABLET: 10; 325 TABLET ORAL at 17:17

## 2019-10-25 RX ADMIN — OMEPRAZOLE 40 MG: 20 CAPSULE, DELAYED RELEASE ORAL at 20:09

## 2019-10-25 NOTE — THERAPY
"Recreational Therapy  Daily Treatment     Patient Name: Richard Hubbard II  AGE:  44 y.o., SEX:  male  Medical Record #: [id number]  Today's Date: 10/25/2019       Subjective    \"Can we stay in here.\" He shared that he preferred to remain in bed due to time constraints.      Objective       10/24/19 1531   Functional Ability Status - Cognitive   Attention Span Remains on Task   Comprehension Follows Three Step Commands   Judgment Able to Make Independent Decisions   Functional Ability Status - Emotional    Affect Appropriate   Mood Appropriate   Behavior Appropriate   Skilled Intervention    Skilled Intervention Cognitive Leisure   Skilled Intervention Comments Cognitive activity and card activity   Interdisciplinary Plan of Care Collaboration   IDT Collaboration with  Nursing;Certified Nursing Assistant   Patient Position at End of Therapy In Bed;Tray Table within Reach;Call Light within Reach   Collaboration Comments Took Pt off of IV.    Treatment Time   Total Time Spent (mins) 30   Procedural Tracking   Procedural Tracking Cognitive Skills Training       Assessment    Pt often sharing that he is not good at cognitive activities. He was provided a cognitive activity in which he had to manipulate mirrors to reflect a light from point a to b. Min verbal cues to solve the problem. 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 He was struggling to remember the rules. Showing some frustration and then was taught a new card game and was able to  the rules quickly.     Plan    Continue to teach new leisure activities.   "

## 2019-10-25 NOTE — THERAPY
"Physical Therapy   Daily Treatment     Patient Name: Richard Hubbard II  Age:  44 y.o., Sex:  male  Medical Record #: [id number]  Today's Date: 10/25/2019     Precautions  Precautions: (P) Weight Bearing As Tolerated Right Lower Extremity, Weight Bearing As Tolerated Left Lower Extremity  Comments: (P) R knee brace/ R knee instability    Subjective    Pt resting in bed, willing to participate     Objective       10/25/19 1431   Precautions   Precautions Weight Bearing As Tolerated Right Lower Extremity;Weight Bearing As Tolerated Left Lower Extremity   Comments R knee brace/ R knee instability   Supine Lower Body Exercise   Ankle Pumps Reciprocal;3 sets of 15   Other Exercises reciprocal quad sets 3 x 15   PT Total Time Spent   PT Individual Total Time Spent (Mins) 60   PT Charge Group   PT Gait Training 2   PT Therapeutic Exercise 1   PT Therapeutic Activities 1       FIM Bed/Chair/Wheelchair Transfers Score: 5 - Standby Prompting/Supervision or Set-up  Bed/Chair/Wheelchair Transfers Description:  Adaptive equipment, Increased time, Verbal cueing, Set-up of equipment    FIM Walking Score:  2 - Max Assistance  Walking Description:  (min A with Arjo walker for upper body support x 70 feet consecutive walking/ no standing rests.)    FIM Wheelchair Score:  6 - Modified Independent  Wheelchair Description:  Extra time(modified independent on unit)    FIM Stairs Score:  1 - Total Assistance  Stairs Description:  (min A with FWW for 4\" platform step x 2 trials. )      Assessment    Pt reports fatigue this pm but able to complete gait and stair training as noted, pt need to navigate 7\" rise + 5.5\" threshold to enter home    Plan    OOB activity tolerance/ progressive gait training with Arjo platform walker vs FWW, joint protection education, LE strength/ core strength training as tolerated, curb step training with // bars vs FWW    "

## 2019-10-25 NOTE — CARE PLAN
Problem: Safety  Goal: Will remain free from injury  Outcome: PROGRESSING AS EXPECTED  Note:   Patient demonstrates good safety technique this shift.  Asks for assistance when needed and does not attempt self transfer.  Able to verbalize needs.  Will continue to monitor.      Problem: Bowel/Gastric:  Goal: Normal bowel function is maintained or improved  Outcome: PROGRESSING AS EXPECTED  Note:   Patient having regular bowel movements; last BM 10/23.  Denies s/s constipation; bowel meds available if needed.  Will continue to monitor.      Problem: Pain Management  Goal: Pain level will decrease to patient's comfort goal  Outcome: PROGRESSING AS EXPECTED  Note:   Patient able to verbalize pain level and verbalize an acceptable level of pain.

## 2019-10-25 NOTE — THERAPY
Physical Therapy   Daily Treatment     Patient Name: Richard Hubbard II  Age:  44 y.o., Sex:  male  Medical Record #: [id number]  Today's Date: 10/25/2019     Precautions  Precautions: (P) Weight Bearing As Tolerated Right Lower Extremity, Weight Bearing As Tolerated Left Lower Extremity  Comments: (P) R knee brace/ R knee instability    Subjective    Pt up in wc, ready for PT     Objective       10/25/19 1101   Precautions   Precautions Weight Bearing As Tolerated Right Lower Extremity;Weight Bearing As Tolerated Left Lower Extremity   Comments R knee brace/ R knee instability   Bed Mobility    Supine to Sit Modified Independent   Sit to Supine Supervised   Sit to Stand Supervised   PT Total Time Spent   PT Individual Total Time Spent (Mins) 30   PT Charge Group   PT Gait Training 1   PT Therapeutic Activities 1       FIM Bed/Chair/Wheelchair Transfers Score: 5 - Standby Prompting/Supervision or Set-up  Bed/Chair/Wheelchair Transfers Description:  Adaptive equipment, Increased time, Set-up of equipment(pt able to compelte sit<>stand/ transfer with FWW and sit>supine with leg  and set-up only)    FIM Walking Score:  1 - Total Assistance  Walking Description:  (SBA with FWW x 60 ft with 2-3 standing rests)    FIM Wheelchair Score:  6 - Modified Independent  Wheelchair Description:  Extra time(modified independent on unit)      Assessment    Pt improving overall mobility tasks with DME/ adaptive equipment and set-up    Plan    OOB activity tolerance/ progressive gait training with Arjo platform walker vs FWW, joint protection education, LE strength/ core strength training as tolerated, curb step training with // bars vs FWW

## 2019-10-25 NOTE — THERAPY
"Occupational Therapy  Daily Treatment     Patient Name: Richard Hubbard II  Age:  44 y.o., Sex:  male  Medical Record #: [id number]  Today's Date: 10/25/2019     Precautions  Precautions: Weight Bearing As Tolerated Right Lower Extremity, Weight Bearing As Tolerated Left Lower Extremity  Comments: R knee brace/ R knee instability    Safety   ADL Safety : Requires Supervision for Safety  Bathroom Safety: Requires Supervision for Safety  Comments: see FIM for dressing and grooming. Able to perform at a largely SBA to mod I level. However, cont to require assistance for shoes due to swelling of feet, impaired motor control, and pain with bending.     Subjective    \"  Thank you \" stated at end of session      Objective       10/25/19 0901   Interdisciplinary Plan of Care Collaboration   IDT Collaboration with  Certified Nursing Assistant   Patient Position at End of Therapy Seated  (at sink completing grooming tasks )   Collaboration Comments patient will call for assist back to bed    OT Total Time Spent   OT Individual Total Time Spent (Mins) 60   OT Charge Group   OT Self Care / ADL 4       FIM Grooming Score:  7 - Independent  Grooming Description:  (seated at sink )    FIM Bathing Score:  5 - Standby Prompting/Supervision or Set-up  Bathing Description:       FIM Upper Body Dressin - Standby Prompting/Supervision or Set-up  Upper Body Dressing Description:  (indirect supervision )    FIM Lower Body Dressing Score:  5 - Standby Prompting/Supervsion or Set-up  Lower Body Dressing Description:  Reacher, Sock aid, Shoe horn    FIM Tub/Shower Transfers Score:  5 - Standby Prompting/Supervision or Set-up  Tub/Shower Transfers Description:  Grab bar     problem solving bathing at home    Walk in shower vs  Tub shower combo.  Will practice both with patient   Assessment     improving independence with ADL      Cues for taking rest breaks as needed   Plan     ADL  , IADL , related mobility strength/endurance " building incorporating joint protection and energy conservation

## 2019-10-25 NOTE — THERAPY
"Occupational Therapy  Daily Treatment     Patient Name: Richard Hubbard II  Age:  44 y.o., Sex:  male  Medical Record #: [id number]  Today's Date: 10/25/2019     Precautions  Precautions: (P) Weight Bearing As Tolerated Right Lower Extremity, Weight Bearing As Tolerated Left Lower Extremity  Comments: (P) R knee brace/ R knee instability    Safety   ADL Safety : Requires Supervision for Safety  Bathroom Safety: Requires Supervision for Safety  Comments: see FIM for dressing and grooming. Able to perform at a largely SBA to mod I level. However, cont to require assistance for shoes due to swelling of feet, impaired motor control, and pain with bending.     Subjective    \" I'm ready.\"     Objective       10/25/19 1331   Precautions   Precautions Weight Bearing As Tolerated Right Lower Extremity;Weight Bearing As Tolerated Left Lower Extremity   Comments R knee brace/ R knee instability   Sitting Upper Body Exercises   Lat Pull 3 sets of 10;Bilateral  (weighted pulley  20 lbs )   Upper Extremity Bike Level 4 Resistance  (x 5 minutes x 2 )   Interdisciplinary Plan of Care Collaboration   Patient Position at End of Therapy In Bed;Call Light within Reach;Tray Table within Reach   OT Total Time Spent   OT Individual Total Time Spent (Mins) 30   OT Charge Group   OT Therapeutic Exercise  2      w/c mobility  Room to gym  Mod I    doff shoes mod I    transfer w/c to bed  Mod I  Using leg    Assessment    Continue with improving endurance and strength.     Plan   ADL  , IADL , related mobility strength/endurance building incorporating joint protection and energy conservation         " No

## 2019-10-25 NOTE — THERAPY
"Recreational Therapy  Daily Treatment     Patient Name: Richard Hubbard II  AGE:  44 y.o., SEX:  male  Medical Record #: [id number]  Today's Date: 10/25/2019       Subjective    \"I'd like to get out of bed.\"     Objective       10/25/19 1231   Functional Ability Status - Cognitive   Attention Span Remains on Task   Comprehension Follows Three Step Commands   Judgment Able to Make Independent Decisions   Functional Ability Status - Emotional    Affect Appropriate   Mood Appropriate   Behavior Appropriate   Skilled Intervention    Skilled Intervention Cognitive Leisure;Relaxation / Coping Skills   Skilled Intervention Comments Taught this writer yarelis   Interdisciplinary Plan of Care Collaboration   Patient Position at End of Therapy Seated;Tray Table within Reach;Call Light within Reach   Treatment Time   Total Time Spent (mins) 30   Procedural Tracking   Procedural Tracking Leisure Skills Development       Assessment    He was taken to the family lounge and requested to play [id number]. He was able to recall and teach the rules to this writer without issue.      Plan    Leisure skills and recreation activities.   "

## 2019-10-25 NOTE — PROGRESS NOTES
"Rehab Progress Note     Encounter Date: 10/25/2019    CC: left pelvic fracture, pain, RA    Interval Events (Subjective)  Patient sitting up in room. He reports his pain is controlled today. He has questions about labs. Discussed mild leukocytosis and most likely from steroids. Patient concerned that it could be from his thigh abscess, discussed that he is still on antibiotics and more likely if no new symptoms that it is from steroids. He is in agreement to start reducing the steroids. Also reviewed that he has anemia and that his iron panel is normal.  Denies SOB.    IDT Team Meeting 10/22/2019  DC/Disposition:  11/1/19    Objective:  VITAL SIGNS: /76   Pulse 76   Temp 36.2 °C (97.1 °F) (Temporal)   Resp 18   Ht 1.803 m (5' 11\") Comment: per patient  Wt 91.6 kg (201 lb 15.1 oz)   SpO2 96%   BMI 28.17 kg/m²   Gen: NAD  Psych: Mood and affect appropriate  CV: RRR, no edema  Resp: CTAB, no upper airway sounds  Abd: NTND  Neuro: AOx4, 5/5 BUE    Recent Results (from the past 72 hour(s))   CBC WITH DIFFERENTIAL    Collection Time: 10/25/19  4:00 AM   Result Value Ref Range    WBC 11.5 (H) 4.8 - 10.8 K/uL    RBC 3.29 (L) 4.70 - 6.10 M/uL    Hemoglobin 9.3 (L) 14.0 - 18.0 g/dL    Hematocrit 31.1 (L) 42.0 - 52.0 %    MCV 94.5 81.4 - 97.8 fL    MCH 28.3 27.0 - 33.0 pg    MCHC 29.9 (L) 33.7 - 35.3 g/dL    RDW 73.0 (H) 35.9 - 50.0 fL    Platelet Count 276 164 - 446 K/uL    MPV 9.2 9.0 - 12.9 fL    Neutrophils-Polys 68.40 44.00 - 72.00 %    Lymphocytes 26.50 22.00 - 41.00 %    Monocytes 3.40 0.00 - 13.40 %    Eosinophils 0.80 0.00 - 6.90 %    Basophils 0.90 0.00 - 1.80 %    Nucleated RBC 0.00 /100 WBC    Neutrophils (Absolute) 7.87 (H) 1.82 - 7.42 K/uL    Lymphs (Absolute) 3.05 1.00 - 4.80 K/uL    Monos (Absolute) 0.39 0.00 - 0.85 K/uL    Eos (Absolute) 0.09 0.00 - 0.51 K/uL    Baso (Absolute) 0.10 0.00 - 0.12 K/uL    NRBC (Absolute) 0.00 K/uL    Hypochromia 1+     Anisocytosis 1+     Macrocytosis 1+     " Microcytosis 1+    Comp Metabolic Panel    Collection Time: 10/25/19  4:00 AM   Result Value Ref Range    Sodium 141 135 - 145 mmol/L    Potassium 3.7 3.6 - 5.5 mmol/L    Chloride 108 96 - 112 mmol/L    Co2 26 20 - 33 mmol/L    Anion Gap 7.0 0.0 - 11.9    Glucose 86 65 - 99 mg/dL    Bun 15 8 - 22 mg/dL    Creatinine 0.61 0.50 - 1.40 mg/dL    Calcium 8.3 (L) 8.5 - 10.5 mg/dL    AST(SGOT) 13 12 - 45 U/L    ALT(SGPT) 10 2 - 50 U/L    Alkaline Phosphatase 89 30 - 99 U/L    Total Bilirubin 0.2 0.1 - 1.5 mg/dL    Albumin 3.0 (L) 3.2 - 4.9 g/dL    Total Protein 5.8 (L) 6.0 - 8.2 g/dL    Globulin 2.8 1.9 - 3.5 g/dL    A-G Ratio 1.1 g/dL   RETICULOCYTES COUNT    Collection Time: 10/25/19  4:00 AM   Result Value Ref Range    Reticulocyte Count 4.5 (H) 0.8 - 2.1 %    Retic, Absolute 0.15 (H) 0.04 - 0.06 M/uL    Imm. Reticulocyte Fraction 26.8 (H) 9.3 - 17.4 %    Retic Hgb Equivalent 32.6 29.0 - 35.0 pg/cell   IRON/TOTAL IRON BIND    Collection Time: 10/25/19  4:00 AM   Result Value Ref Range    Iron 52 50 - 180 ug/dL    Total Iron Binding 309 250 - 450 ug/dL    % Saturation 17 15 - 55 %   DIFFERENTIAL MANUAL    Collection Time: 10/25/19  4:00 AM   Result Value Ref Range    Manual Diff Status PERFORMED    PERIPHERAL SMEAR REVIEW    Collection Time: 10/25/19  4:00 AM   Result Value Ref Range    Peripheral Smear Review see below    PLATELET ESTIMATE    Collection Time: 10/25/19  4:00 AM   Result Value Ref Range    Plt Estimation Normal    MORPHOLOGY    Collection Time: 10/25/19  4:00 AM   Result Value Ref Range    RBC Morphology Present     Polychromia 1+    ESTIMATED GFR    Collection Time: 10/25/19  4:00 AM   Result Value Ref Range    GFR If African American >60 >60 mL/min/1.73 m 2    GFR If Non African American >60 >60 mL/min/1.73 m 2       Current Facility-Administered Medications   Medication Frequency   • [START ON 10/26/2019] predniSONE (DELTASONE) tablet 30 mg DAILY   • [START ON 10/26/2019] nicotine (NICODERM) 7 MG/24HR  7 mg Daily-0600    And   • nicotine polacrilex (NICORETTE) 2 MG piece 2 mg Q HOUR PRN   • HYDROcodone-acetaminophen (NORCO) 5-325 MG per tablet 1 Tab Q3HRS PRN   • HYDROcodone/acetaminophen (NORCO)  MG per tablet 1 Tab Q3HRS PRN   • acetaminophen (TYLENOL) tablet 500 mg TID   • pregabalin (LYRICA) capsule 75 mg TID   • vitamin D (cholecalciferol) tablet 2,000 Units DAILY   • normal saline PF 20 mL Q12HRS   • Respiratory Care per Protocol Continuous RT   • tramadol (ULTRAM) 50 MG tablet 50 mg Q4HRS PRN   • hydrALAZINE (APRESOLINE) tablet 25 mg Q8HRS PRN   • acetaminophen (TYLENOL) tablet 650 mg Q4HRS PRN   • senna-docusate (PERICOLACE or SENOKOT S) 8.6-50 MG per tablet 2 Tab BID    And   • polyethylene glycol/lytes (MIRALAX) PACKET 1 Packet QDAY PRN    And   • magnesium hydroxide (MILK OF MAGNESIA) suspension 30 mL QDAY PRN    And   • bisacodyl (DULCOLAX) suppository 10 mg QDAY PRN   • artificial tears ophthalmic solution 1 Drop PRN   • benzocaine-menthol (CEPACOL) lozenge 1 Lozenge Q2HRS PRN   • mag hydrox-al hydrox-simeth (MAALOX PLUS ES or MYLANTA DS) suspension 20 mL Q2HRS PRN   • ondansetron (ZOFRAN ODT) dispertab 4 mg 4X/DAY PRN    Or   • ondansetron (ZOFRAN) syringe/vial injection 4 mg 4X/DAY PRN   • traZODone (DESYREL) tablet 50 mg QHS PRN   • sodium chloride (OCEAN) 0.65 % nasal spray 2 Spray PRN   • calcium citrate (CALCITRATE) tablet 950 mg DAILY   • ceFAZolin (ANCEF) 2 g in  mL IVPB Q8HRS   • enoxaparin (LOVENOX) inj 40 mg DAILY   • ferrous sulfate tablet 325 mg DAILY   • furosemide (LASIX) tablet 20 mg BID DIURETIC   • losartan (COZAAR) tablet 25 mg DAILY   • morphine ER (MS CONTIN) tablet 15 mg TID   • omeprazole (PRILOSEC) capsule 40 mg BID   • simethicone (MYLICON) chewable tab 80 mg TID PRN       Orders Placed This Encounter   Procedures   • Diet Order Regular     Standing Status:   Standing     Number of Occurrences:   1     Order Specific Question:   Diet:     Answer:   Regular [1]        Assessment:  Active Hospital Problems    Diagnosis   • *Pubic ramus fracture (HCC)   • Rheumatoid arthritis (HCC)   • Iron deficiency anemia, unspecified iron deficiency anemia type   • MONICA (obstructive sleep apnea)   • Depression       Medical Decision Making and Plan:  Left pelvic fracture - managed conservatively, found to have abscess in thigh status post drainage.   -PT and OT for mobility and ADLs  -Follow-up with Orthopedics     Pain - Patient with left hip and right knee. Was supposed to get right knee replacement prior to falls and hospitalization.  -On Morphine 15 mg BID, Lyrica 25 mg TID, and scheduled Tylenol. With Norco PRN 5 to 10 mg - trial instead of Oxycodone. Discussed risks and benefits.   -Increase Lyrica to 75 mg TID     Left thigh mass/osteomyelitis - s/p drainage with MSSA growing from cultures.    -Continue Ancef 2 g q8h until 11/22/19.  Recommending weekly CBC and CMP - schedule for Fridays.   -WBC 11.5 on 10/25/19, most likely steroids. Continue to monitor, afebrile      Fungal rash - Patient with diffuse fungal rash. Patient s/p fluconazole for 10 days. Per pharmacy no miconazole cream currently. Will switch to clotrimazole cream BID  -Discontinue cream as resolved.      HTN/AS - Patient on Losartan 25 mg daily, Lasix 20 mg BID   -Held due to low SBP, adjust parameters.      Anemia - Patient on Fe supplement on transfer. Check AM CBC - 8.3, stable. Check Iron panel in AM  -9.3, iron panel normal.      RA - Patient previously on DMARD, now on hold while has active osteomyelitis. On Predinsone 40 mg daily  -Titrate down to 30 mg      Nicotine dependence - On patch on transfer. Will need teaching.   -Reduce Nicotine patch. Counseling performed on 10/24/19      GERD - History of PUD. Continue Omeprazole.     Vitamin D deficiency - 1000 U on transfer. Check level - 20.  Increase to 2000 U daily     DVT ppx - Patient on Lovenox on transfer. Walking 60 feet     Total time:  27 minutes.  I  spent greater than 50% of the time for patient care, counseling, and coordination on this date, including unit/floor time, and face-to-face time with the patient as per interval events and assessment and plan above. Topics discussed included morning labs, anemia, and reduce steroids.     Keaton Pisano M.D.

## 2019-10-26 PROCEDURE — 770010 HCHG ROOM/CARE - REHAB SEMI PRIVAT*

## 2019-10-26 PROCEDURE — 700105 HCHG RX REV CODE 258: Performed by: PHYSICAL MEDICINE & REHABILITATION

## 2019-10-26 PROCEDURE — 700111 HCHG RX REV CODE 636 W/ 250 OVERRIDE (IP): Performed by: PHYSICAL MEDICINE & REHABILITATION

## 2019-10-26 PROCEDURE — 700102 HCHG RX REV CODE 250 W/ 637 OVERRIDE(OP): Performed by: PHYSICAL MEDICINE & REHABILITATION

## 2019-10-26 PROCEDURE — 700101 HCHG RX REV CODE 250: Performed by: PHYSICAL MEDICINE & REHABILITATION

## 2019-10-26 PROCEDURE — A9270 NON-COVERED ITEM OR SERVICE: HCPCS | Performed by: PHYSICAL MEDICINE & REHABILITATION

## 2019-10-26 RX ADMIN — SENNOSIDES AND DOCUSATE SODIUM 2 TABLET: 8.6; 5 TABLET ORAL at 21:46

## 2019-10-26 RX ADMIN — HYDROCODONE BITARTRATE AND ACETAMINOPHEN 1 TABLET: 10; 325 TABLET ORAL at 04:24

## 2019-10-26 RX ADMIN — ENOXAPARIN SODIUM 40 MG: 100 INJECTION SUBCUTANEOUS at 08:41

## 2019-10-26 RX ADMIN — PREGABALIN 75 MG: 75 CAPSULE ORAL at 08:42

## 2019-10-26 RX ADMIN — VITAMIN D, TAB 1000IU (100/BT) 2000 UNITS: 25 TAB at 08:43

## 2019-10-26 RX ADMIN — CEFAZOLIN 2 G: 10 INJECTION, POWDER, FOR SOLUTION INTRAVENOUS at 21:53

## 2019-10-26 RX ADMIN — OMEPRAZOLE 40 MG: 20 CAPSULE, DELAYED RELEASE ORAL at 21:47

## 2019-10-26 RX ADMIN — CEFAZOLIN 2 G: 10 INJECTION, POWDER, FOR SOLUTION INTRAVENOUS at 06:14

## 2019-10-26 RX ADMIN — OMEPRAZOLE 40 MG: 20 CAPSULE, DELAYED RELEASE ORAL at 08:42

## 2019-10-26 RX ADMIN — FUROSEMIDE 20 MG: 20 TABLET ORAL at 06:13

## 2019-10-26 RX ADMIN — MORPHINE SULFATE 15 MG: 15 TABLET, FILM COATED, EXTENDED RELEASE ORAL at 14:09

## 2019-10-26 RX ADMIN — LOSARTAN POTASSIUM 25 MG: 25 TABLET ORAL at 08:41

## 2019-10-26 RX ADMIN — PREGABALIN 75 MG: 75 CAPSULE ORAL at 21:47

## 2019-10-26 RX ADMIN — Medication 20 ML: at 09:00

## 2019-10-26 RX ADMIN — ACETAMINOPHEN 500 MG: 500 TABLET, FILM COATED ORAL at 21:45

## 2019-10-26 RX ADMIN — FUROSEMIDE 20 MG: 20 TABLET ORAL at 16:11

## 2019-10-26 RX ADMIN — HYDROCODONE BITARTRATE AND ACETAMINOPHEN 1 TABLET: 10; 325 TABLET ORAL at 16:11

## 2019-10-26 RX ADMIN — ACETAMINOPHEN 500 MG: 500 TABLET, FILM COATED ORAL at 08:42

## 2019-10-26 RX ADMIN — SENNOSIDES AND DOCUSATE SODIUM 2 TABLET: 8.6; 5 TABLET ORAL at 08:44

## 2019-10-26 RX ADMIN — NICOTINE 7 MG: 7 PATCH, EXTENDED RELEASE TRANSDERMAL at 06:13

## 2019-10-26 RX ADMIN — PREDNISONE 30 MG: 20 TABLET ORAL at 08:44

## 2019-10-26 RX ADMIN — HYDROCODONE BITARTRATE AND ACETAMINOPHEN 1 TABLET: 10; 325 TABLET ORAL at 12:34

## 2019-10-26 RX ADMIN — MORPHINE SULFATE 15 MG: 15 TABLET, FILM COATED, EXTENDED RELEASE ORAL at 21:46

## 2019-10-26 RX ADMIN — CALCIUM CITRATE 200 MG (950 MG) TABLET 950 MG: at 08:44

## 2019-10-26 RX ADMIN — PREGABALIN 75 MG: 75 CAPSULE ORAL at 14:09

## 2019-10-26 RX ADMIN — MORPHINE SULFATE 15 MG: 15 TABLET, FILM COATED, EXTENDED RELEASE ORAL at 08:42

## 2019-10-26 RX ADMIN — CEFAZOLIN 2 G: 10 INJECTION, POWDER, FOR SOLUTION INTRAVENOUS at 14:10

## 2019-10-26 RX ADMIN — FERROUS SULFATE TAB 325 MG (65 MG ELEMENTAL FE) 325 MG: 325 (65 FE) TAB at 08:44

## 2019-10-26 RX ADMIN — HYDROCODONE BITARTRATE AND ACETAMINOPHEN 1 TABLET: 10; 325 TABLET ORAL at 08:43

## 2019-10-26 RX ADMIN — HYDROCODONE BITARTRATE AND ACETAMINOPHEN 1 TABLET: 5; 325 TABLET ORAL at 21:47

## 2019-10-26 RX ADMIN — Medication 20 ML: at 22:49

## 2019-10-26 NOTE — CARE PLAN
"  Problem: Safety  Goal: Will remain free from injury  Outcome: PROGRESSING AS EXPECTED  Note:   Patient demonstrates good safety technique this shift.  Asks for assistance when needed and does not attempt self transfer.  Able to verbalize needs.  Will continue to monitor.      Problem: Bowel/Gastric:  Goal: Normal bowel function is maintained or improved  Outcome: PROGRESSING AS EXPECTED  Note:   Patient having regular bowel movements; last BM 10/25.  Denies s/s constipation; bowel meds available if needed.  Will continue to monitor.      Problem: Pain Management  Goal: Pain level will decrease to patient's comfort goal  Outcome: PROGRESSING SLOWER THAN EXPECTED  Note:   Pt stated pain is \"unmanaged with current PRN and scheduled pain medication\". Pt offered tramadol but refused. Will continue to monitor.      "

## 2019-10-26 NOTE — CARE PLAN
Problem: Safety  Goal: Will remain free from injury  Intervention: Educate patient and significant other/support system about adaptive mobility strategies and safe transfers  Note:   Pt uses call light consistently and appropriately. Waits for assistance does not attempt self transfer this shift. Able to verbalize needs.      Problem: Pain Management  Goal: Pain level will decrease to patient's comfort goal  Intervention: Follow pain managment plan developed in collaboration with patient and Interdisciplinary Team  Note:   Patient states his pain is not well managed with scheduled and prn pain management. Patient offered heat/ice therapy and declined. Patient assisted with home Tens unit placement to lower back which he was thankful for.will continue to monitor.

## 2019-10-26 NOTE — PROGRESS NOTES
Patient care assumed. Report received from Noc Anne Marie.  Patient is alert and calm, resting in bed. Call light and bedside table within reach. Will continue to monitor.

## 2019-10-26 NOTE — PROGRESS NOTES
Pt reached maximum dose of 4000 mg of acetaminophen within a 24 hour period at 0000 10/26 between scheduled tylenol and PRN NORCO. Pt offered Tramadol to relieve pain, but refused. Pt offered pain medication at 0330 but was asleep. Charge nurse notified. Will continue to monitor.

## 2019-10-27 PROCEDURE — 700111 HCHG RX REV CODE 636 W/ 250 OVERRIDE (IP): Performed by: PHYSICAL MEDICINE & REHABILITATION

## 2019-10-27 PROCEDURE — 770010 HCHG ROOM/CARE - REHAB SEMI PRIVAT*

## 2019-10-27 PROCEDURE — 700102 HCHG RX REV CODE 250 W/ 637 OVERRIDE(OP): Performed by: PHYSICAL MEDICINE & REHABILITATION

## 2019-10-27 PROCEDURE — 700105 HCHG RX REV CODE 258: Performed by: PHYSICAL MEDICINE & REHABILITATION

## 2019-10-27 PROCEDURE — 700101 HCHG RX REV CODE 250: Performed by: PHYSICAL MEDICINE & REHABILITATION

## 2019-10-27 PROCEDURE — A9270 NON-COVERED ITEM OR SERVICE: HCPCS | Performed by: PHYSICAL MEDICINE & REHABILITATION

## 2019-10-27 RX ADMIN — FERROUS SULFATE TAB 325 MG (65 MG ELEMENTAL FE) 325 MG: 325 (65 FE) TAB at 08:51

## 2019-10-27 RX ADMIN — CEFAZOLIN 2 G: 10 INJECTION, POWDER, FOR SOLUTION INTRAVENOUS at 13:57

## 2019-10-27 RX ADMIN — HYDROCODONE BITARTRATE AND ACETAMINOPHEN 1 TABLET: 10; 325 TABLET ORAL at 12:39

## 2019-10-27 RX ADMIN — Medication 20 ML: at 21:00

## 2019-10-27 RX ADMIN — HYDROCODONE BITARTRATE AND ACETAMINOPHEN 1 TABLET: 10; 325 TABLET ORAL at 20:11

## 2019-10-27 RX ADMIN — PREGABALIN 75 MG: 75 CAPSULE ORAL at 20:12

## 2019-10-27 RX ADMIN — CEFAZOLIN 2 G: 10 INJECTION, POWDER, FOR SOLUTION INTRAVENOUS at 06:01

## 2019-10-27 RX ADMIN — SENNOSIDES AND DOCUSATE SODIUM 2 TABLET: 8.6; 5 TABLET ORAL at 20:11

## 2019-10-27 RX ADMIN — LOSARTAN POTASSIUM 25 MG: 25 TABLET ORAL at 08:51

## 2019-10-27 RX ADMIN — Medication 20 ML: at 09:00

## 2019-10-27 RX ADMIN — PREGABALIN 75 MG: 75 CAPSULE ORAL at 13:57

## 2019-10-27 RX ADMIN — SENNOSIDES AND DOCUSATE SODIUM 2 TABLET: 8.6; 5 TABLET ORAL at 08:51

## 2019-10-27 RX ADMIN — VITAMIN D, TAB 1000IU (100/BT) 2000 UNITS: 25 TAB at 08:51

## 2019-10-27 RX ADMIN — MORPHINE SULFATE 15 MG: 15 TABLET, FILM COATED, EXTENDED RELEASE ORAL at 08:52

## 2019-10-27 RX ADMIN — PREDNISONE 30 MG: 20 TABLET ORAL at 08:51

## 2019-10-27 RX ADMIN — HYDROCODONE BITARTRATE AND ACETAMINOPHEN 1 TABLET: 10; 325 TABLET ORAL at 08:52

## 2019-10-27 RX ADMIN — ENOXAPARIN SODIUM 40 MG: 100 INJECTION SUBCUTANEOUS at 08:50

## 2019-10-27 RX ADMIN — MORPHINE SULFATE 15 MG: 15 TABLET, FILM COATED, EXTENDED RELEASE ORAL at 20:11

## 2019-10-27 RX ADMIN — ACETAMINOPHEN 500 MG: 500 TABLET, FILM COATED ORAL at 08:52

## 2019-10-27 RX ADMIN — OMEPRAZOLE 40 MG: 20 CAPSULE, DELAYED RELEASE ORAL at 08:51

## 2019-10-27 RX ADMIN — HYDROCODONE BITARTRATE AND ACETAMINOPHEN 1 TABLET: 5; 325 TABLET ORAL at 04:30

## 2019-10-27 RX ADMIN — MORPHINE SULFATE 15 MG: 15 TABLET, FILM COATED, EXTENDED RELEASE ORAL at 13:57

## 2019-10-27 RX ADMIN — FUROSEMIDE 20 MG: 20 TABLET ORAL at 17:16

## 2019-10-27 RX ADMIN — CALCIUM CITRATE 200 MG (950 MG) TABLET 950 MG: at 08:52

## 2019-10-27 RX ADMIN — FUROSEMIDE 20 MG: 20 TABLET ORAL at 06:01

## 2019-10-27 RX ADMIN — PREGABALIN 75 MG: 75 CAPSULE ORAL at 08:52

## 2019-10-27 RX ADMIN — NICOTINE 7 MG: 7 PATCH, EXTENDED RELEASE TRANSDERMAL at 06:06

## 2019-10-27 RX ADMIN — CEFAZOLIN 2 G: 10 INJECTION, POWDER, FOR SOLUTION INTRAVENOUS at 21:48

## 2019-10-27 RX ADMIN — OMEPRAZOLE 40 MG: 20 CAPSULE, DELAYED RELEASE ORAL at 20:12

## 2019-10-27 NOTE — CARE PLAN
Problem: Pain Management  Goal: Pain level will decrease to patient's comfort goal  Intervention: Follow pain managment plan developed in collaboration with patient and Interdisciplinary Team  Note:   Patient states his pain is not well-controlled despite scheduled and prn pain management. Patient offered heat/cold therapy, refused at this time.     Problem: Skin Integrity  Goal: Risk for impaired skin integrity will decrease  Intervention: Assess risk factors for impaired skin integrity and/or pressure ulcers  Note:   Patient's skin remains intact and free from new or accidental injury this shift.  Will continue to monitor.

## 2019-10-27 NOTE — PROGRESS NOTES
Patient care assumed. Report received from Noc RN. Mary Beth Patient is alert and calm, resting in bed. Call light and bedside table within reach. Will continue to monitor.

## 2019-10-27 NOTE — CARE PLAN
Problem: Safety  Goal: Will remain free from falls  Note:   Safety measure reinforced, bed at lowest level, call light and bedside table within easy reach. Pt free from fall and injury.     Problem: Pain Management  Goal: Pain level will decrease to patient's comfort goal  Intervention: Educate and implement non-pharmacologic comfort measures. Examples: relaxation, distration, play therapy, activity therapy, massage, etc.  Note:   Pt requested for oxycodone for left hip pain with relief .

## 2019-10-28 PROCEDURE — 700105 HCHG RX REV CODE 258: Performed by: PHYSICAL MEDICINE & REHABILITATION

## 2019-10-28 PROCEDURE — 700102 HCHG RX REV CODE 250 W/ 637 OVERRIDE(OP): Performed by: PHYSICAL MEDICINE & REHABILITATION

## 2019-10-28 PROCEDURE — 97535 SELF CARE MNGMENT TRAINING: CPT

## 2019-10-28 PROCEDURE — 700111 HCHG RX REV CODE 636 W/ 250 OVERRIDE (IP): Performed by: PHYSICAL MEDICINE & REHABILITATION

## 2019-10-28 PROCEDURE — 97116 GAIT TRAINING THERAPY: CPT

## 2019-10-28 PROCEDURE — 99233 SBSQ HOSP IP/OBS HIGH 50: CPT | Performed by: PHYSICAL MEDICINE & REHABILITATION

## 2019-10-28 PROCEDURE — A9270 NON-COVERED ITEM OR SERVICE: HCPCS | Performed by: PHYSICAL MEDICINE & REHABILITATION

## 2019-10-28 PROCEDURE — 97110 THERAPEUTIC EXERCISES: CPT

## 2019-10-28 PROCEDURE — 700101 HCHG RX REV CODE 250: Performed by: PHYSICAL MEDICINE & REHABILITATION

## 2019-10-28 PROCEDURE — 770010 HCHG ROOM/CARE - REHAB SEMI PRIVAT*

## 2019-10-28 PROCEDURE — 97530 THERAPEUTIC ACTIVITIES: CPT

## 2019-10-28 RX ORDER — PREGABALIN 75 MG/1
75 CAPSULE ORAL ONCE
Status: COMPLETED | OUTPATIENT
Start: 2019-10-28 | End: 2019-10-28

## 2019-10-28 RX ORDER — PREGABALIN 150 MG/1
150 CAPSULE ORAL 3 TIMES DAILY
Status: DISCONTINUED | OUTPATIENT
Start: 2019-10-28 | End: 2019-11-02 | Stop reason: HOSPADM

## 2019-10-28 RX ORDER — PREDNISONE 20 MG/1
20 TABLET ORAL DAILY
Status: DISCONTINUED | OUTPATIENT
Start: 2019-10-29 | End: 2019-10-31

## 2019-10-28 RX ADMIN — VITAMIN D, TAB 1000IU (100/BT) 2000 UNITS: 25 TAB at 09:22

## 2019-10-28 RX ADMIN — HYDROCODONE BITARTRATE AND ACETAMINOPHEN 1 TABLET: 10; 325 TABLET ORAL at 15:02

## 2019-10-28 RX ADMIN — ENOXAPARIN SODIUM 40 MG: 100 INJECTION SUBCUTANEOUS at 09:26

## 2019-10-28 RX ADMIN — Medication 20 ML: at 09:28

## 2019-10-28 RX ADMIN — ACETAMINOPHEN 500 MG: 500 TABLET, FILM COATED ORAL at 21:28

## 2019-10-28 RX ADMIN — MORPHINE SULFATE 15 MG: 15 TABLET, FILM COATED, EXTENDED RELEASE ORAL at 15:02

## 2019-10-28 RX ADMIN — HYDROCODONE BITARTRATE AND ACETAMINOPHEN 1 TABLET: 10; 325 TABLET ORAL at 21:38

## 2019-10-28 RX ADMIN — PREGABALIN 75 MG: 75 CAPSULE ORAL at 09:23

## 2019-10-28 RX ADMIN — HYDROCODONE BITARTRATE AND ACETAMINOPHEN 1 TABLET: 10; 325 TABLET ORAL at 18:36

## 2019-10-28 RX ADMIN — LOSARTAN POTASSIUM 25 MG: 25 TABLET ORAL at 09:23

## 2019-10-28 RX ADMIN — CALCIUM CITRATE 200 MG (950 MG) TABLET 950 MG: at 09:23

## 2019-10-28 RX ADMIN — PREGABALIN 150 MG: 150 CAPSULE ORAL at 21:27

## 2019-10-28 RX ADMIN — HYDROCODONE BITARTRATE AND ACETAMINOPHEN 1 TABLET: 10; 325 TABLET ORAL at 09:22

## 2019-10-28 RX ADMIN — SENNOSIDES AND DOCUSATE SODIUM 2 TABLET: 8.6; 5 TABLET ORAL at 09:22

## 2019-10-28 RX ADMIN — CEFAZOLIN 2 G: 10 INJECTION, POWDER, FOR SOLUTION INTRAVENOUS at 21:27

## 2019-10-28 RX ADMIN — MORPHINE SULFATE 15 MG: 15 TABLET, FILM COATED, EXTENDED RELEASE ORAL at 09:22

## 2019-10-28 RX ADMIN — Medication 20 ML: at 21:29

## 2019-10-28 RX ADMIN — FERROUS SULFATE TAB 325 MG (65 MG ELEMENTAL FE) 325 MG: 325 (65 FE) TAB at 09:22

## 2019-10-28 RX ADMIN — CEFAZOLIN 2 G: 10 INJECTION, POWDER, FOR SOLUTION INTRAVENOUS at 15:03

## 2019-10-28 RX ADMIN — FUROSEMIDE 20 MG: 20 TABLET ORAL at 05:25

## 2019-10-28 RX ADMIN — ACETAMINOPHEN 500 MG: 500 TABLET, FILM COATED ORAL at 09:22

## 2019-10-28 RX ADMIN — PREDNISONE 30 MG: 20 TABLET ORAL at 09:24

## 2019-10-28 RX ADMIN — PREGABALIN 75 MG: 75 CAPSULE ORAL at 17:39

## 2019-10-28 RX ADMIN — CEFAZOLIN 2 G: 10 INJECTION, POWDER, FOR SOLUTION INTRAVENOUS at 05:26

## 2019-10-28 RX ADMIN — NICOTINE 7 MG: 7 PATCH, EXTENDED RELEASE TRANSDERMAL at 05:25

## 2019-10-28 RX ADMIN — OMEPRAZOLE 40 MG: 20 CAPSULE, DELAYED RELEASE ORAL at 21:27

## 2019-10-28 RX ADMIN — MORPHINE SULFATE 15 MG: 15 TABLET, FILM COATED, EXTENDED RELEASE ORAL at 21:27

## 2019-10-28 RX ADMIN — OMEPRAZOLE 40 MG: 20 CAPSULE, DELAYED RELEASE ORAL at 09:23

## 2019-10-28 RX ADMIN — FUROSEMIDE 20 MG: 20 TABLET ORAL at 15:01

## 2019-10-28 RX ADMIN — SENNOSIDES AND DOCUSATE SODIUM 2 TABLET: 8.6; 5 TABLET ORAL at 21:27

## 2019-10-28 RX ADMIN — HYDROCODONE BITARTRATE AND ACETAMINOPHEN 1 TABLET: 10; 325 TABLET ORAL at 01:17

## 2019-10-28 RX ADMIN — HYDROCODONE BITARTRATE AND ACETAMINOPHEN 1 TABLET: 10; 325 TABLET ORAL at 05:25

## 2019-10-28 RX ADMIN — PREGABALIN 75 MG: 75 CAPSULE ORAL at 15:02

## 2019-10-28 NOTE — THERAPY
"Occupational Therapy  Daily Treatment     Patient Name: Richard Hubbard II  Age:  44 y.o., Sex:  male  Medical Record #: [id number]  Today's Date: 10/28/2019     Precautions  Precautions: Weight Bearing As Tolerated Right Lower Extremity, Weight Bearing As Tolerated Left Lower Extremity  Comments: R knee brace/ R knee instability    Safety   ADL Safety : Requires Supervision for Safety  Bathroom Safety: Requires Supervision for Safety  Comments: see FIM for dressing and grooming. Able to perform at a largely SBA to mod I level. However, cont to require assistance for shoes due to swelling of feet, impaired motor control, and pain with bending.     Subjective    \"I have to do what I have to do\"      Objective       10/28/19 1301   Sitting Upper Body Exercises   Lat Press 3 sets of 15;Bilateral;Weight (See Comments for lbs)  (30# rickshaw)   Bilateral Row 3 sets of 10;Bilateral;Weight (See Comments for lbs)  (30# equalizer)   Tricep Press 3 sets of 15;Bilateral;Weight (See Comments for lbs)  (25# rickshaw)   Interdisciplinary Plan of Care Collaboration   Patient Position at End of Therapy Seated;Call Light within Reach;Tray Table within Reach   OT Total Time Spent   OT Individual Total Time Spent (Mins) 30   OT Charge Group   OT Therapeutic Exercise  2       Assessment    Pt completed UB therex to the best of their abilities with no complaints of pain    Plan    Cont overall strength/endurance and standing balance to improve ADL's and functional mobility    "

## 2019-10-28 NOTE — PROGRESS NOTES
"Rehab Progress Note     Encounter Date: 10/28/2019    CC: left pelvic fracture, pain, RA    Interval Events (Subjective)  Patient sitting up in room. He reports his weekend was \"boring.\" He reports he got no therapy as he had already had his 15 hours during the week. He reports he is just going to live with his pain. Discussed increasing Lyrica. He is agreeable to try. He reports he is walking slowly longer distances.  Reviewed and today walking > 100 feet. Denies NVD. Denies SOB.    IDT Team Meeting 10/22/2019  DC/Disposition:  11/1/19    Objective:  VITAL SIGNS: /82   Pulse (!) 104   Temp 36.7 °C (98.1 °F) (Oral)   Resp 16   Ht 1.803 m (5' 11\") Comment: per patient  Wt 99.1 kg (218 lb 7.6 oz)   SpO2 96%   BMI 30.47 kg/m²   Gen: NAD  Psych: Mood and affect appropriate  CV: RRR, no edema  Resp: CTAB, no upper airway sounds  Abd: NTND  Neuro: AOx4, following simple commands    No results found for this or any previous visit (from the past 72 hour(s)).    Current Facility-Administered Medications   Medication Frequency   • predniSONE (DELTASONE) tablet 30 mg DAILY   • nicotine (NICODERM) 7 MG/24HR 7 mg Daily-0600    And   • nicotine polacrilex (NICORETTE) 2 MG piece 2 mg Q HOUR PRN   • HYDROcodone-acetaminophen (NORCO) 5-325 MG per tablet 1 Tab Q3HRS PRN   • HYDROcodone/acetaminophen (NORCO)  MG per tablet 1 Tab Q3HRS PRN   • acetaminophen (TYLENOL) tablet 500 mg TID   • pregabalin (LYRICA) capsule 75 mg TID   • vitamin D (cholecalciferol) tablet 2,000 Units DAILY   • normal saline PF 20 mL Q12HRS   • Respiratory Care per Protocol Continuous RT   • tramadol (ULTRAM) 50 MG tablet 50 mg Q4HRS PRN   • hydrALAZINE (APRESOLINE) tablet 25 mg Q8HRS PRN   • acetaminophen (TYLENOL) tablet 650 mg Q4HRS PRN   • senna-docusate (PERICOLACE or SENOKOT S) 8.6-50 MG per tablet 2 Tab BID    And   • polyethylene glycol/lytes (MIRALAX) PACKET 1 Packet QDAY PRN    And   • magnesium hydroxide (MILK OF MAGNESIA) " suspension 30 mL QDAY PRN    And   • bisacodyl (DULCOLAX) suppository 10 mg QDAY PRN   • artificial tears ophthalmic solution 1 Drop PRN   • benzocaine-menthol (CEPACOL) lozenge 1 Lozenge Q2HRS PRN   • mag hydrox-al hydrox-simeth (MAALOX PLUS ES or MYLANTA DS) suspension 20 mL Q2HRS PRN   • ondansetron (ZOFRAN ODT) dispertab 4 mg 4X/DAY PRN    Or   • ondansetron (ZOFRAN) syringe/vial injection 4 mg 4X/DAY PRN   • traZODone (DESYREL) tablet 50 mg QHS PRN   • sodium chloride (OCEAN) 0.65 % nasal spray 2 Spray PRN   • calcium citrate (CALCITRATE) tablet 950 mg DAILY   • ceFAZolin (ANCEF) 2 g in  mL IVPB Q8HRS   • enoxaparin (LOVENOX) inj 40 mg DAILY   • ferrous sulfate tablet 325 mg DAILY   • furosemide (LASIX) tablet 20 mg BID DIURETIC   • losartan (COZAAR) tablet 25 mg DAILY   • morphine ER (MS CONTIN) tablet 15 mg TID   • omeprazole (PRILOSEC) capsule 40 mg BID   • simethicone (MYLICON) chewable tab 80 mg TID PRN       Orders Placed This Encounter   Procedures   • Diet Order Regular     Standing Status:   Standing     Number of Occurrences:   1     Order Specific Question:   Diet:     Answer:   Regular [1]       Assessment:  Active Hospital Problems    Diagnosis   • *Pubic ramus fracture (HCC)   • Rheumatoid arthritis (HCC)   • Iron deficiency anemia, unspecified iron deficiency anemia type   • MONICA (obstructive sleep apnea)   • Depression       Medical Decision Making and Plan:  Left pelvic fracture - managed conservatively, found to have abscess in thigh status post drainage.   -PT and OT for mobility and ADLs  -Follow-up with Orthopedics     Pain - Patient with left hip and right knee. Was supposed to get right knee replacement prior to falls and hospitalization.  -On Morphine 15 mg BID, Lyrica 25 mg TID, and scheduled Tylenol. With Norco PRN 5 to 10 mg - trial instead of Oxycodone. Discussed risks and benefits.   -Increase Lyrica to 150 mg TID.      Left thigh mass/osteomyelitis - s/p drainage with MSSA  growing from cultures.    -Continue Ancef 2 g q8h until 11/22/19.  Recommending weekly CBC and CMP - schedule for Fridays.   -WBC 11.5 on 10/25/19, most likely steroids. Continue to monitor, afebrile      Fungal rash - Patient with diffuse fungal rash. Patient s/p fluconazole for 10 days. Per pharmacy no miconazole cream currently. Will switch to clotrimazole cream BID  -Discontinue cream as resolved.      HTN/AS - Patient on Losartan 25 mg daily, Lasix 20 mg BID   -Held due to low SBP, adjust parameters.      Anemia - Patient on Fe supplement on transfer. Check AM CBC - 8.3, stable. Check Iron panel in AM  -9.3, iron panel normal.      RA - Patient previously on DMARD, now on hold while has active osteomyelitis. On Predinsone 40 mg daily  -Titrate down to 30 mg      Nicotine dependence - On patch on transfer. Will need teaching.   -Reduce Nicotine patch. Counseling performed on 10/24/19      GERD - History of PUD. Continue Omeprazole.     Vitamin D deficiency - 1000 U on transfer. Check level - 20.  Increase to 2000 U daily     DVT ppx - Patient on Lovenox on transfer. Walking > 100 feet, discontinue Lovenox     Total time:  35 minutes.  I spent greater than 50% of the time for patient care, counseling, and coordination on this date, including unit/floor time, and face-to-face time with the patient as per interval events and assessment and plan above. Topics discussed included discharge planning, pain control, increase Lyrica, and discontinue Lovenox as ambulating.     Keaton Pisano M.D.

## 2019-10-28 NOTE — THERAPY
"Physical Therapy   Daily Treatment     Patient Name: Richard Hubbard II  Age:  44 y.o., Sex:  male  Medical Record #: [id number]  Today's Date: 10/28/2019     Precautions  Precautions: Weight Bearing As Tolerated Right Lower Extremity, Weight Bearing As Tolerated Left Lower Extremity  Comments: R knee brace/ R knee instability    Subjective    \"I'm sorry I didn't think I had anything right now\" Pt found in family lounge, agreeable to PT.      Objective       10/28/19 1101   Precautions   Precautions Weight Bearing As Tolerated Left Lower Extremity;Weight Bearing As Tolerated Right Lower Extremity;Fall Risk   Comments R knee brace/ R knee instability   Sitting Lower Body Exercises   Nustep Resistance Level 1  (11.5 min, 0.16 miles, R abduction support device )   Interdisciplinary Plan of Care Collaboration   Patient Position at End of Therapy Seated;Other (Comments)   Collaboration Comments pt propelled self to room after PT session    PT Total Time Spent   PT Individual Total Time Spent (Mins) 30   PT Charge Group   PT Therapeutic Exercise 1   PT Therapeutic Activities 1       FIM Wheelchair Score:  6 - Modified Independent  Wheelchair Description:  Extra time(Room > gym, mod I )      Assessment    Pt with good tolerance for Nustep machine, reported mild discomfort in L hip/ groin, but overall reported he felt use of machine was useful.     Plan    OOB activity tolerance/ progressive gait training with Arjo platform walker vs FWW, joint protection education, LE strength/ core strength training as tolerated, curb step training with // bars vs FWW       "

## 2019-10-28 NOTE — CARE PLAN
Problem: Safety  Goal: Will remain free from injury  Outcome: PROGRESSING AS EXPECTED  Intervention: Provide assistance with mobility  Note:   Pt uses call light  appropriately. Waits for assistance does not attempt self transfer this shift. Able to verbalize needs.      Problem: Pain Management  Goal: Pain level will decrease to patient's comfort goal  Outcome: PROGRESSING AS EXPECTED  Intervention: Follow pain managment plan developed in collaboration with patient and Interdisciplinary Team  Note:   Nebo 1 tab given PRN per MAR for c/o left hip/leg pain with moderate relief. Pt sleeps good. Will continue to monitor.

## 2019-10-28 NOTE — THERAPY
Occupational Therapy  Daily Treatment     Patient Name: Richard Hubbard II  Age:  44 y.o., Sex:  male  Medical Record #: [id number]  Today's Date: 10/28/2019     Precautions  Precautions: (P) Weight Bearing As Tolerated Right Lower Extremity, Weight Bearing As Tolerated Left Lower Extremity  Comments: (P) R knee brace/ R knee instability    Safety   ADL Safety : Requires Supervision for Safety  Bathroom Safety: (P) Requires Physical Assist for Safety(Minimal assist, see bathing FIMs for additional information)  Comments: see FIM for dressing and grooming. Able to perform at a largely SBA to mod I level. However, cont to require assistance for shoes due to swelling of feet, impaired motor control, and pain with bending.     Subjective    Patient agreeable to participate in OT. Reported ~6 to 7/10 pain in hip/R knee; prefers to take medication once he returns to bed.      Objective     10/28/19 1401   Precautions   Precautions Weight Bearing As Tolerated Right Lower Extremity;Weight Bearing As Tolerated Left Lower Extremity   Comments R knee brace/ R knee instability   Safety    Bathroom Safety Requires Physical Assist for Safety  (Minimal assist, see bathing FIMs for additional information)   IADL Treatments   Home Management Patient completed laundry task with CGA to SBA while standing and using washer for UE support.    Interdisciplinary Plan of Care Collaboration   Patient Position at End of Therapy Seated   OT Total Time Spent   OT Individual Total Time Spent (Mins) 60   OT Charge Group   OT Self Care / ADL 4       FIM Bathing Score:  4 - Minimal Assistance  Bathing Description:   FIM Bathing Score:  4 - Minimal Assistance  Bathing Description:  Adaptive equipment, Grab bar, Tub bench, Long handled bath tool, Hand held shower, Increased time, Supervision for safety, Set-up of equipment, Initial preparation for task(Patient able to wash, rinse and dry all body parts except requires assist with rinsing/drying  buttocks and drying feet. Incorporates sitting and standing with use of grab bar into bathing tasks. )    FIM Upper Body Dressin - Standby Prompting/Supervision or Set-up  Upper Body Dressing Description:  Increased time, Set-up of equipment(Patient donned hospital gown with set-up while seated in w/c )    FIM Lower Body Dressing Score:  5 - Standby Prompting/Supervsion or Set-up  Lower Body Dressing Description:  Sock aid, Increased time, Reacher(Patient donned socks with set-up and use of reacher/sock aid)    FIM Tub/Shower Transfers Score:  5 - Standby Prompting/Supervision or Set-up  Tub/Shower Transfers Description:  Grab bar, Increased time, Supervision for safety, Set-up of equipment(Patient performs wc <>shower bench txfrs with SBA for safety)    Mod I for w/c mobility.     Assessment    Patient tolerated OT session well with focus on ADLs and IADLs. Patient demonstrated ability to perform bathing tasks at min assist level and required min assist with STS when performing drying tasks (due to decreased endurance and hip pain). Highly motivated to participate in therapy and improve overall function.     Plan    Cont overall strength/endurance and standing balance to improve ADL's and functional mobility

## 2019-10-28 NOTE — THERAPY
Physical Therapy   Daily Treatment     Patient Name: Richard Hubbard II  Age:  44 y.o., Sex:  male  Medical Record #: [id number]  Today's Date: 10/28/2019     Precautions  Precautions: (P) Weight Bearing As Tolerated Right Lower Extremity, Weight Bearing As Tolerated Left Lower Extremity  Comments: (P) R knee brace/ R knee instability    Subjective    Pt was sitting EOB upon arrival; agreeable to tx.      Objective       10/28/19 0901   Precautions   Precautions Weight Bearing As Tolerated Right Lower Extremity;Weight Bearing As Tolerated Left Lower Extremity   Comments R knee brace/ R knee instability   Interdisciplinary Plan of Care Collaboration   Patient Position at End of Therapy Seated;Call Light within Reach;Tray Table within Reach;Phone within Reach   PT Total Time Spent   PT Individual Total Time Spent (Mins) 60   PT Charge Group   PT Gait Training 1   PT Therapeutic Activities 3       FIM Bed/Chair/Wheelchair Transfers Score: 5 - Standby Prompting/Supervision or Set-up  Bed/Chair/Wheelchair Transfers Description:  (bed mob: mod I; transfer : SPV SPT with FWW)    FIM Walking Score:  2 - Max Assistance  Walking Description:  (125ft x 1, arjo, SBA, level terrain)    W/c mobility 200ft  X2, mod I    Education on wrist protection during functional and repetitive activities specific to patient, especially in relation to care of his children and step into the home    Discussion of performance of step into home/ laundry room navigation. Mimic of the potential set up with vertical grab bar and strap (to protect wrist) as trial.    Review of supine therex program for B LE verbally.    B LE dressing with pants, socks and shoes, mod I with adaptive equipment    Assessment    Pt with significant R knee valgus and R inversion as well as forward flexed posture with AMB with arjo    Plan    OOB activity tolerance/ progressive gait training with Arjo platform walker vs FWW, joint protection education, LE strength/ core  strength training as tolerated, curb step training with // bars vs FWW

## 2019-10-28 NOTE — THERAPY
Recreational Therapy  Daily Treatment     Patient Name: Richard Hubbard II  AGE:  44 y.o., SEX:  male  Medical Record #: [id number]  Today's Date: 10/28/2019       Subjective    Spoke about having a visit with his children this weekend and playing games with them.      Objective       10/28/19 1031   Functional Ability Status - Cognitive   Attention Span Remains on Task   Comprehension Follows Three Step Commands   Judgment Able to Make Independent Decisions   Functional Ability Status - Emotional    Affect Appropriate   Mood Appropriate   Behavior Appropriate   Skilled Intervention    Skilled Intervention Cognitive Leisure;Relaxation / Coping Skills   Skilled Intervention Comments Robel   Interdisciplinary Plan of Care Collaboration   Patient Position at End of Therapy Seated  (Remained in the family lounge following session. )   Treatment Time   Total Time Spent (mins) 30   Procedural Tracking   Procedural Tracking Cognitive Skills Training       Assessment    Was taken to the family unge to play a game of Aeromot. He was able to correctly remember the rules and successfully play the game.     Plan    Continue to have Pt participate in leisure activities he could participate in following discharge.

## 2019-10-29 DIAGNOSIS — M86.10 OTHER ACUTE OSTEOMYELITIS, UNSPECIFIED SITE (HCC): ICD-10-CM

## 2019-10-29 PROCEDURE — 97530 THERAPEUTIC ACTIVITIES: CPT

## 2019-10-29 PROCEDURE — 700102 HCHG RX REV CODE 250 W/ 637 OVERRIDE(OP): Performed by: PHYSICAL MEDICINE & REHABILITATION

## 2019-10-29 PROCEDURE — 700105 HCHG RX REV CODE 258: Performed by: PHYSICAL MEDICINE & REHABILITATION

## 2019-10-29 PROCEDURE — A9270 NON-COVERED ITEM OR SERVICE: HCPCS | Performed by: PHYSICAL MEDICINE & REHABILITATION

## 2019-10-29 PROCEDURE — 97535 SELF CARE MNGMENT TRAINING: CPT

## 2019-10-29 PROCEDURE — 700101 HCHG RX REV CODE 250: Performed by: PHYSICAL MEDICINE & REHABILITATION

## 2019-10-29 PROCEDURE — 99233 SBSQ HOSP IP/OBS HIGH 50: CPT | Performed by: PHYSICAL MEDICINE & REHABILITATION

## 2019-10-29 PROCEDURE — 770010 HCHG ROOM/CARE - REHAB SEMI PRIVAT*

## 2019-10-29 PROCEDURE — 97110 THERAPEUTIC EXERCISES: CPT

## 2019-10-29 PROCEDURE — 97116 GAIT TRAINING THERAPY: CPT

## 2019-10-29 PROCEDURE — 700111 HCHG RX REV CODE 636 W/ 250 OVERRIDE (IP): Performed by: PHYSICAL MEDICINE & REHABILITATION

## 2019-10-29 RX ORDER — POLYETHYLENE GLYCOL 3350 17 G/17G
1 POWDER, FOR SOLUTION ORAL
Status: DISCONTINUED | OUTPATIENT
Start: 2019-10-29 | End: 2019-11-02 | Stop reason: HOSPADM

## 2019-10-29 RX ORDER — BISACODYL 10 MG
10 SUPPOSITORY, RECTAL RECTAL
Status: DISCONTINUED | OUTPATIENT
Start: 2019-10-29 | End: 2019-11-02 | Stop reason: HOSPADM

## 2019-10-29 RX ORDER — AMOXICILLIN 250 MG
2 CAPSULE ORAL 2 TIMES DAILY PRN
Status: DISCONTINUED | OUTPATIENT
Start: 2019-10-29 | End: 2019-11-02 | Stop reason: HOSPADM

## 2019-10-29 RX ADMIN — FUROSEMIDE 20 MG: 20 TABLET ORAL at 17:34

## 2019-10-29 RX ADMIN — ACETAMINOPHEN 500 MG: 500 TABLET, FILM COATED ORAL at 21:09

## 2019-10-29 RX ADMIN — NICOTINE 7 MG: 7 PATCH, EXTENDED RELEASE TRANSDERMAL at 05:37

## 2019-10-29 RX ADMIN — OMEPRAZOLE 40 MG: 20 CAPSULE, DELAYED RELEASE ORAL at 21:09

## 2019-10-29 RX ADMIN — FERROUS SULFATE TAB 325 MG (65 MG ELEMENTAL FE) 325 MG: 325 (65 FE) TAB at 08:51

## 2019-10-29 RX ADMIN — HYDROCODONE BITARTRATE AND ACETAMINOPHEN 1 TABLET: 10; 325 TABLET ORAL at 08:55

## 2019-10-29 RX ADMIN — MORPHINE SULFATE 15 MG: 15 TABLET, FILM COATED, EXTENDED RELEASE ORAL at 16:55

## 2019-10-29 RX ADMIN — PREGABALIN 150 MG: 150 CAPSULE ORAL at 16:55

## 2019-10-29 RX ADMIN — LOSARTAN POTASSIUM 25 MG: 25 TABLET ORAL at 08:51

## 2019-10-29 RX ADMIN — Medication 20 ML: at 08:59

## 2019-10-29 RX ADMIN — PREDNISONE 20 MG: 20 TABLET ORAL at 08:51

## 2019-10-29 RX ADMIN — CALCIUM CITRATE 200 MG (950 MG) TABLET 950 MG: at 08:50

## 2019-10-29 RX ADMIN — FUROSEMIDE 20 MG: 20 TABLET ORAL at 05:37

## 2019-10-29 RX ADMIN — SENNOSIDES AND DOCUSATE SODIUM 2 TABLET: 8.6; 5 TABLET ORAL at 08:50

## 2019-10-29 RX ADMIN — OMEPRAZOLE 40 MG: 20 CAPSULE, DELAYED RELEASE ORAL at 08:51

## 2019-10-29 RX ADMIN — MORPHINE SULFATE 15 MG: 15 TABLET, FILM COATED, EXTENDED RELEASE ORAL at 21:08

## 2019-10-29 RX ADMIN — Medication 20 ML: at 21:13

## 2019-10-29 RX ADMIN — HYDROCODONE BITARTRATE AND ACETAMINOPHEN 1 TABLET: 10; 325 TABLET ORAL at 21:13

## 2019-10-29 RX ADMIN — HYDROCODONE BITARTRATE AND ACETAMINOPHEN 1 TABLET: 10; 325 TABLET ORAL at 16:55

## 2019-10-29 RX ADMIN — CEFAZOLIN 2 G: 10 INJECTION, POWDER, FOR SOLUTION INTRAVENOUS at 16:57

## 2019-10-29 RX ADMIN — ACETAMINOPHEN 500 MG: 500 TABLET, FILM COATED ORAL at 16:55

## 2019-10-29 RX ADMIN — VITAMIN D, TAB 1000IU (100/BT) 2000 UNITS: 25 TAB at 08:50

## 2019-10-29 RX ADMIN — PREGABALIN 150 MG: 150 CAPSULE ORAL at 21:09

## 2019-10-29 RX ADMIN — MORPHINE SULFATE 15 MG: 15 TABLET, FILM COATED, EXTENDED RELEASE ORAL at 08:51

## 2019-10-29 RX ADMIN — PREGABALIN 150 MG: 150 CAPSULE ORAL at 08:50

## 2019-10-29 RX ADMIN — HYDROCODONE BITARTRATE AND ACETAMINOPHEN 1 TABLET: 10; 325 TABLET ORAL at 05:37

## 2019-10-29 RX ADMIN — CEFAZOLIN 2 G: 10 INJECTION, POWDER, FOR SOLUTION INTRAVENOUS at 05:36

## 2019-10-29 RX ADMIN — CEFAZOLIN 2 G: 10 INJECTION, POWDER, FOR SOLUTION INTRAVENOUS at 21:18

## 2019-10-29 ASSESSMENT — PATIENT HEALTH QUESTIONNAIRE - PHQ9
2. FEELING DOWN, DEPRESSED, IRRITABLE, OR HOPELESS: NOT AT ALL
2. FEELING DOWN, DEPRESSED, IRRITABLE, OR HOPELESS: NOT AT ALL
SUM OF ALL RESPONSES TO PHQ9 QUESTIONS 1 AND 2: 0
1. LITTLE INTEREST OR PLEASURE IN DOING THINGS: NOT AT ALL
1. LITTLE INTEREST OR PLEASURE IN DOING THINGS: NOT AT ALL
SUM OF ALL RESPONSES TO PHQ9 QUESTIONS 1 AND 2: 0

## 2019-10-29 NOTE — CARE PLAN
Problem: Mobility  Goal: STG-Within one week, patient will ambulate household distance  Description  1) Individualized goal:  CGA with FWW and R knee brace x 50 ft  2) Interventions:  PT Group Therapy, PT E Stim Attended, PT Gait Training, PT Self Care/Home Eval, PT Therapeutic Exercises, PT TENS Application, PT Neuro Re-Ed/Balance, PT Aquatic Therapy, PT Therapeutic Activity, PT Manual Therapy and PT Evaluation     Outcome: MET     Problem: Mobility Transfers  Goal: STG-Within one week, patient will perform bed mobility  Description  1) Individualized goal:  Min A for body mechanics/ pain control   2) Interventions:  PT Group Therapy, PT E Stim Attended, PT Gait Training, PT Self Care/Home Eval, PT Therapeutic Exercises, PT TENS Application, PT Neuro Re-Ed/Balance, PT Aquatic Therapy, PT Therapeutic Activity, PT Manual Therapy and PT Evaluation   Outcome: MET  Goal: STG-Within one week, patient will sit to stand  Description  1) Individualized goal:  SPV/ set-up with FWW  2) Interventions:  PT Group Therapy, PT E Stim Attended, PT Gait Training, PT Self Care/Home Eval, PT Therapeutic Exercises, PT TENS Application, PT Neuro Re-Ed/Balance, PT Aquatic Therapy, PT Therapeutic Activity, PT Manual Therapy and PT Evaluation     Outcome: MET

## 2019-10-29 NOTE — REHAB-NURSING IDT TEAM NOTE
Nursing   Nursing  Diet/Nutrition:  Regular  Medication Administration:  Whole with Liquid Wash  % consumed at meals in last 24 hours:  Consumed % of meals per documentation. FAMILY BRINGS FAST FOOD IN FREQUENTLY.  Meal/Snack Consumption for the past 24 hrs:   Oral Nutrition   10/28/19 1300 Lunch;Between % Consumed   10/28/19 0900 Breakfast;Between % Consumed     Snack schedule:  FREQUENT FROM FAMILY  Supplement:  N/A  Appetite:  Excellent  Fluid Intake/Output in past 24 hours: In: 1080 [P.O.:1080]  Out: 2425   Admit Weight:  Weight: 91.6 kg (201 lb 15.1 oz)  Weight Last 7 Days   [99.1 kg (218 lb 7.6 oz)] 99.1 kg (218 lb 7.6 oz) (10/27 1100)    Pain Issues:    Location:  Generalized (10/28 2138)  Left (10/28 0740)         Severity:  Moderate REPORTS PAIN CONSISTENTLY -   Scheduled pain medications: MORPHINE - LYRICA     PRN pain medications used in last 24 hours:  oxycodone immediate release (ROXICODONE)    Non Pharmacologic Interventions:  relaxation, repositioned and rest  Effectiveness of pain management plan:  PATIENT DOES NOT APPEAR TO BE IN ANY DISTRESS -     Bowel:    Bowel Assist Initial Score:  3 - Moderate Assistance  Bowel Assist Current Score:  5 - Standby Prompting/Supervision or Set-up  Bowl Accidents in last 7 days:     Last bowel movement: 10/27/19(per pt)  Stool Description: Large, Soft     Usual bowel pattern:  every 2-3 days  Scheduled bowel medications:  senna-docusate (PERICOLACE or SENOKOT S)   PRN bowel medications used in last 24 hours:  polyethylene glycol/lytes (MIRALAX)   Nursing Interventions:  Increased time, PRN medication, Scheduled medication, Supervision, Positioning on commode/toilet, Brief  Effectiveness of bowel program:   NO BOWEL PROGRAM  Bladder:    Bladder Assist Initial Score:  4 - Minimal Assistance  Bladder Assist Current Score:  6 - Modified Independent  Bladder Accidents in last 7 days:  0  PVR range for past 24-48 hours: -- ()  Intermittent  Catheterization:  N/A  Medications affecting bladder:  None    Time void schedule/voiding pattern:  Voiding every 2-4 hours  Interventions:  Increased time, Urinal  Effectiveness of bladder training:  Good=regular, predictable, emptying of bladder, patient initiates time voiding        Wound:    NONE                   Sleep/wake cycle:   Average hours slept:  Sleeps 4-6 hours without waking  Sleep medication usage:  None    Patient/Family Training/Education:  Diet/Nutrition PAIN REGIMEN  Discharge Supply Recommendations:  NONE AT THIS TIME  Strengths: Alert and oriented, Able to follow instructions, Supportive family, Pleasant and cooperative and Effective communication skills   Barriers:   Pain poorly managed           Long Term Goals:   At discharge patient will be able to fun[place]on safely and independently at home and in the community.    Section completed by:  Nannette Valera R.N.

## 2019-10-29 NOTE — REHAB-PT IDT TEAM NOTE
"Physical Therapy   Mobility  Bed mobility:   SPV / modified independent with leg lifting/ increased time  Bed /Chair/Wheelchair Transfer Initial:  3 - Moderate Assistance  Bed /Chair/Wheelchair Transfer Current:  5 - Standby Prompting/Supervision or Set-up   Bed/Chair/Wheelchair Transfer Description:  (bed mob: mod I; transfer : SPV SPT with FWW)  Walk Initial:  1 - Total Assistance  Walk Current:  2 - Max Assistance   Walk Description:  (125ft x 1, arjo, SBA, level terrain)  Wheelchair Initial:  1 - Total Assistance  Wheelchair Current:  6 - Modified Independent   Wheelchair Description:  Extra time(Room > gym, mod I )  Stairs Initial:  0 - Not tested,unsafe activity  Stairs Current: 1 - Total Assistance   Stairs Description: (min A with FWW for 4\" platform step x 2 trials. )  Patient/Family Training/Education:  On-going strength/ endurance/ mobility training and joint protection education  DME/DC Recommendations:  20\" lightweight wc/ cushion, FWW with B platform attachments, home health PT, TENS unit  Strengths:  Able to follow instructions, Independent PLOF, Making steady progress towards goals, Manages pain appropriately, Motivated for self care and independence, Pleasant and cooperative, Supportive family and Willingly participates in therapeutic activities  Barriers:   Decreased endurance, Limited mobility and Other: RA/ R knee instability  # of short term goals set= 4  # of short term goals met=4  Physical Therapy Problems     Problem: PT-Long Term Goals     Dates: Start: 10/18/19       Description:     Goal: LTG-By discharge, patient will propel wheelchair     Dates: Start: 10/18/19       Description: 1) Individualized goal:  Modified independent for household mobility 50 ft x 2 to/from therapies  2) Interventions:  PT Group Therapy, PT E Stim Attended, PT Gait Training, PT Self Care/Home Eval, PT Therapeutic Exercises, PT TENS Application, PT Neuro Re-Ed/Balance, PT Aquatic Therapy, PT Therapeutic Activity, " PT Manual Therapy and PT Evaluation             Goal: LTG-By discharge, patient will ambulate     Dates: Start: 10/18/19       Description: 1) Individualized goal:  Modified independent with FWW for household mobility 50 ft x 3  2) Interventions:  PT Group Therapy, PT E Stim Attended, PT Gait Training, PT Self Care/Home Eval, PT Therapeutic Exercises, PT TENS Application, PT Neuro Re-Ed/Balance, PT Aquatic Therapy, PT Therapeutic Activity, PT Manual Therapy and PT Evaluation             Goal: LTG-By discharge, patient will transfer one surface to another     Dates: Start: 10/18/19       Description: 1) Individualized goal:  Modified independent with FWW  2) Interventions:  PT Group Therapy, PT E Stim Attended, PT Gait Training, PT Self Care/Home Eval, PT Therapeutic Exercises, PT TENS Application, PT Neuro Re-Ed/Balance, PT Aquatic Therapy, PT Therapeutic Activity, PT Manual Therapy and PT Evaluation             Goal: LTG-By discharge, patient will ambulate up/down 4-6 stairs     Dates: Start: 10/18/19       Description: 1) Individualized goal:  SBA with hand rail vs SPC  2) Interventions:  PT Group Therapy, PT E Stim Attended, PT Gait Training, PT Self Care/Home Eval, PT Therapeutic Exercises, PT TENS Application, PT Neuro Re-Ed/Balance, PT Aquatic Therapy, PT Therapeutic Activity, PT Manual Therapy and PT Evaluation             Goal: LTG-By discharge, patient will transfer in/out of a car     Dates: Start: 10/18/19       Description: 1) Individualized goal:  Modified independent with FWW  2) Interventions:  PT Group Therapy, PT E Stim Attended, PT Gait Training, PT Self Care/Home Eval, PT Therapeutic Exercises, PT TENS Application, PT Neuro Re-Ed/Balance, PT Aquatic Therapy, PT Therapeutic Activity, PT Manual Therapy and PT Evaluation                           Section completed by:  Kate Bryant, BSPT, ATP

## 2019-10-29 NOTE — PROGRESS NOTES
"Rehab Progress Note     Encounter Date: 10/29/2019    CC: left pelvic fracture, pain, RA    Interval Events (Subjective)  Patient sitting up in room. He reports he has not noticed an improvement with the higher dose of Lyrica. He reports he thinks he may just get used to the pain. He reports his biggest complaint now is his over use of his right wrist. He reports he was putting more weight through his arm to reduce pain in his right knee and now it is worse.  He reports otherwise he is happy with the therapy. He reports his biggest concern is that his wife has to work out of town on Friday. He reports it would be better if he could get more therapy to work on the wrist and then discharge on Saturday. Discussed will have IDT later today to discuss possible change in discharge.     IDT Team Meeting 10/29/2019    IKeaton M.D., was present and led the interdisciplinary team conference on 10/29/2019.  I led the IDT conference and agree with the IDT conference documentation and plan of care as noted below.     RN:  Diet Regular   % Meal     Pain Limited by pain; every 3 hours   Sleep    Bowel Continent   Bladder Continent   In's & Out's      PT:  Bed Mobility Alfonzo leg    Transfers Alfonzo leg    Mobility Adore 50-75 feet due to pain/endurance   Stairs Adore for 2 6\"   Now with right wrist pain due to putting more weight through wrists  Limited by RA, right knee and home     OT:  Eating    Grooming    Bathing    UB Dressing ind   LB Dressing supervs   Toileting Alfonzo   Shower & Transfer supervs   Needs cues for adaptive equipment   Working on IADLs    Rec:  Min-mod verbal cues for cognition  Francisco santoyo     CM:  Continues to work on disposition and DME needs.      DC/Disposition:  11/2/19    Objective:  VITAL SIGNS: /76   Pulse 98   Temp 36.3 °C (97.4 °F) (Oral)   Resp 18   Ht 1.803 m (5' 11\") Comment: per patient  Wt 99.1 kg (218 lb 7.6 oz)   SpO2 95%   BMI 30.47 kg/m²   Gen: " NAD  Psych: Mood and affect appropriate  CV: RRR, no edema  Resp: CTAB, no upper airway sounds  Abd: NTND  Neuro: AOx4, following simple commands  Unchanged from 10/28/19    No results found for this or any previous visit (from the past 72 hour(s)).    Current Facility-Administered Medications   Medication Frequency   • pregabalin (LYRICA) capsule 150 mg TID   • predniSONE (DELTASONE) tablet 20 mg DAILY   • nicotine (NICODERM) 7 MG/24HR 7 mg Daily-0600    And   • nicotine polacrilex (NICORETTE) 2 MG piece 2 mg Q HOUR PRN   • HYDROcodone-acetaminophen (NORCO) 5-325 MG per tablet 1 Tab Q3HRS PRN   • HYDROcodone/acetaminophen (NORCO)  MG per tablet 1 Tab Q3HRS PRN   • acetaminophen (TYLENOL) tablet 500 mg TID   • vitamin D (cholecalciferol) tablet 2,000 Units DAILY   • normal saline PF 20 mL Q12HRS   • Respiratory Care per Protocol Continuous RT   • tramadol (ULTRAM) 50 MG tablet 50 mg Q4HRS PRN   • hydrALAZINE (APRESOLINE) tablet 25 mg Q8HRS PRN   • acetaminophen (TYLENOL) tablet 650 mg Q4HRS PRN   • senna-docusate (PERICOLACE or SENOKOT S) 8.6-50 MG per tablet 2 Tab BID    And   • polyethylene glycol/lytes (MIRALAX) PACKET 1 Packet QDAY PRN    And   • magnesium hydroxide (MILK OF MAGNESIA) suspension 30 mL QDAY PRN    And   • bisacodyl (DULCOLAX) suppository 10 mg QDAY PRN   • artificial tears ophthalmic solution 1 Drop PRN   • benzocaine-menthol (CEPACOL) lozenge 1 Lozenge Q2HRS PRN   • mag hydrox-al hydrox-simeth (MAALOX PLUS ES or MYLANTA DS) suspension 20 mL Q2HRS PRN   • ondansetron (ZOFRAN ODT) dispertab 4 mg 4X/DAY PRN    Or   • ondansetron (ZOFRAN) syringe/vial injection 4 mg 4X/DAY PRN   • traZODone (DESYREL) tablet 50 mg QHS PRN   • sodium chloride (OCEAN) 0.65 % nasal spray 2 Spray PRN   • calcium citrate (CALCITRATE) tablet 950 mg DAILY   • ceFAZolin (ANCEF) 2 g in  mL IVPB Q8HRS   • ferrous sulfate tablet 325 mg DAILY   • furosemide (LASIX) tablet 20 mg BID DIURETIC   • losartan (COZAAR)  tablet 25 mg DAILY   • morphine ER (MS CONTIN) tablet 15 mg TID   • omeprazole (PRILOSEC) capsule 40 mg BID   • simethicone (MYLICON) chewable tab 80 mg TID PRN       Orders Placed This Encounter   Procedures   • Diet Order Regular     Standing Status:   Standing     Number of Occurrences:   1     Order Specific Question:   Diet:     Answer:   Regular [1]       Assessment:  Active Hospital Problems    Diagnosis   • *Pubic ramus fracture (HCC)   • Rheumatoid arthritis (HCC)   • Iron deficiency anemia, unspecified iron deficiency anemia type   • MONICA (obstructive sleep apnea)   • Depression       Medical Decision Making and Plan:  Left pelvic fracture - managed conservatively, found to have abscess in thigh status post drainage.   -PT and OT for mobility and ADLs  -Follow-up with Orthopedics     Pain - Patient with left hip and right knee. Was supposed to get right knee replacement prior to falls and hospitalization.  -On Morphine 15 mg BID, Lyrica 25 mg TID, and scheduled Tylenol. With Norco PRN 5 to 10 mg - trial instead of Oxycodone. Discussed risks and benefits.   -Increase Lyrica to 150 mg TID, no change in symptoms. Continue to monitor, he reports confusion on Gabapentin     Left thigh mass/osteomyelitis - s/p drainage with MSSA growing from cultures.    -Continue Ancef 2 g q8h until 11/22/19.  Recommending weekly CBC and CMP - schedule for Fridays.   -WBC 11.5 on 10/25/19, most likely steroids. Continue to monitor, afebrile      Fungal rash - Patient with diffuse fungal rash. Patient s/p fluconazole for 10 days. Per pharmacy no miconazole cream currently. Will switch to clotrimazole cream BID  -Discontinue cream as resolved.      HTN/AS - Patient on Losartan 25 mg daily, Lasix 20 mg BID   -Held due to low SBP, adjust parameters.      Anemia - Patient on Fe supplement on transfer. Check AM CBC - 8.3, stable. Check Iron panel in AM  -9.3, iron panel normal.      RA - Patient previously on DMARD, now on hold while  has active osteomyelitis. On Predinsone 40 mg daily  -Titrate down to 20 mg      Nicotine dependence - On patch on transfer. Will need teaching.   -Reduce Nicotine patch. Counseling performed on 10/24/19      GERD - History of PUD. Continue Omeprazole.     Vitamin D deficiency - 1000 U on transfer. Check level - 20.  Increase to 2000 U daily     DVT ppx - Patient on Lovenox on transfer. Walking > 100 feet, discontinue Lovenox    Dispo - Patient with worsening right wrist pain due to overuse. Would benefit from additional day of therapy to work on technique. Plan for discharge on Saturday.     Total time:  35 minutes.  I spent greater than 50% of the time for patient care, counseling, and coordination on this date, including unit/floor time, and face-to-face time with the patient as per interval events and assessment and plan above. Topics discussed included discharge planning, right wrist pain from overuse and decrease prednisone. Patient was discussed separately in IDT today; please see details above.    Keaton Pisano M.D.

## 2019-10-29 NOTE — REHAB-CM IDT TEAM NOTE
Case Management      DC Planning    DC destination/dispostion: Home w/ supportive wife to [place].     Referrals: IV abx: [facility]. Rehab w/o [facility] eval. DME.    DC Needs: DME for patient safety & mobility. HH vs Rehab w/o [facility]. IV abx through 11.19.19. MD f/u appts. Family training.    Bar[place] to [place] Anxiety. Functional mobility deficits. Medical co-morbidities. Home status.     Strengths: Supportive wife & family. Motivation. Age.    Section completed by:  Leonor Villagomez R.N.

## 2019-10-29 NOTE — CARE PLAN
Problem: Safety  Goal: Will remain free from injury  Note:   Patient educated on importance of utilizing call light to minimize the potential of injury from falls. Patient verbalizes understanding.       Problem: Infection  Goal: Will remain free from infection  Note:   Education reinforced to wash hands thoroughly after using the restroom, prior to eating and throughout the day to minimize the potential of acquiring germs while in a facility setting. Patient engaged in conversation and verbalizes understanding.

## 2019-10-29 NOTE — REHAB-COLLABORATIVE ONGOING IDT TEAM NOTE
Weekly Interdisciplinary Team Conference Note    Weekly Interdisciplinary Team Conference # 2  Date:  10/29/2019    Clinicians present and reporting at team conference include the following:   MD: STEPHON Pisano MD    RN:  Karli Chiu RN    PT:   Virgen Bryant PT, BSPT  OT:  Bruno Lee MS, OTR/L  and Wojciech Clements DUBON/L   ST:  Not Applicable.   CM:  Leonor Villagomez RN [facility]  REC:  Kvng Vázquez, CTRS  RT:  Not Applicable  RPh:  Trudy Laboy [facility]  All reporting clinicians have a working knowledge of this patient's plan of care.    Targeted DC Date:  11.2.19     Medical    Patient Active Problem List    Diagnosis Date Noted   • Pubic ramus fracture ([facility]) 09/28/2019     Priority: Low   • Essential hypertension 09/13/2019     Priority: Low   • Rheumatoid arthritis ([facility]) 09/08/2015     Priority: Low   • Chronic pain of right knee 09/08/2015     Priority: Low   • Osteomyelitis ([facility]) 10/07/2019   • Positive occult stool blood test 09/17/2019   • Aortic root dilatation ([facility]) 09/13/2019   • Elevated alkaline phosphatase level 09/13/2019   • Aortic stenosis, moderate 09/12/2019   • Iron deficiency anemia, unspecified iron deficiency anemia type 08/15/2019   • Localized edema 08/15/2019   • Aortic ejection murmur 03/08/2019   • Obesity (BMI 35.0-39.9 without comorbidity) ([facility]) 08/09/2018   • Pagophagia 11/13/2017   • Adult ADHD 09/08/2010   • MONICA (obstructive sleep apnea) 03/09/2010   • Bleeding ulcer 05/21/2009   • Depression 05/21/2009   • Eczema 05/21/2009     Results     ** No results found for the last 24 hours. **        Nursing  Diet/Nutrition:  Regular  Medication Administration:  Whole with Liquid Wash  % consumed at meals in last 24 hours:  Consumed % of meals per documentation. FAMILY BRINGS FAST FOOD IN FREQUENTLY.  Meal/Snack Consumption for the past 24 hrs:   Oral Nutrition   10/28/19 1300 Lunch;Between % Consumed   10/28/19 0900 Breakfast;Between % Consumed     Snack schedule:  FREQUENT  FROM FAMILY  Supplement:  N/A  Appetite:  Excellent  Fluid Intake/Output in past 24 hours: In: 1080 [P.O.:1080]  Out: 2425   Admit Weight:  Weight: 91.6 kg (201 lb 15.1 oz)  Weight Last 7 Days   [99.1 kg (218 lb 7.6 oz)] 99.1 kg (218 lb 7.6 oz) (10/27 1100)    Pain Issues:    Location:  Generalized (10/28 9988)  Left (10/28 9162)         Severity:  Moderate REPORTS PAIN CONSISTENTLY -   Scheduled pain medications: MORPHINE - LYRICA     PRN pain medications used in last 24 hours:  oxycodone immediate release (ROXICODONE)    Non Pharmacologic Interventions:  relaxation, repositioned and rest  Effectiveness of pain management plan:  PATIENT DOES NOT APPEAR TO BE IN ANY DISTRESS -     Bowel:    Bowel Assist Initial Score:  3 - Moderate Assistance  Bowel Assist Current Score:  5 - Standby Prompting/Supervision or Set-up  Bowl Accidents in last 7 days:     Last bowel movement: 10/27/19(per pt)  Stool Description: Large, Soft     Usual bowel pattern:  every 2-3 days  Scheduled bowel medications:  senna-docusate (PERICOLACE or SENOKOT S)   PRN bowel medications used in last 24 hours:  polyethylene glycol/lytes (MIRALAX)   Nursing Interventions:  Increased time, PRN medication, Scheduled medication, Supervision, Positioning on commode/toilet, Brief  Effectiveness of bowel program:   NO BOWEL PROGRAM  Bladder:    Bladder Assist Initial Score:  4 - Minimal Assistance  Bladder Assist Current Score:  6 - Modified Independent  Bladder Accidents in last 7 days:  0  PVR range for past 24-48 hours: -- ()  Intermittent Catheterization:  N/A  Medications affecting bladder:  None    Time void schedule/voiding pattern:  Voiding every 2-4 hours  Interventions:  Increased time, Urinal  Effectiveness of bladder training:  Good=regular, predictable, emptying of bladder, patient initiates time voiding        Wound:    NONE                   Sleep/wake cycle:   Average hours slept:  Sleeps 4-6 hours without waking  Sleep medication usage:   "None    Patient/Family Training/Education:  Diet/Nutrition PAIN REGIMEN  Discharge Supply Recommendations:  NONE AT THIS TIME  Strengths: Alert and oriented, Able to follow instructions, Supportive family, Pleasant and cooperative and Effective communication skills   Barriers:   Pain poorly managed           Long Term Goals:   At discharge patient will be able to [place] safely and independently at home and in the community.    Section completed by:  Nannette Valera R.N.           Mobility  Bed mobility:   SPV / modified independent with leg lifting/ increased time  Bed /Chair/Wheelchair Transfer Initial:  3 - Moderate Assistance  Bed /Chair/Wheelchair Transfer Current:  5 - Standby Prompting/Supervision or Set-up   Bed/Chair/Wheelchair Transfer Description:  (bed mob: mod I; transfer : SPV SPT with FWW)  Walk Initial:  1 - Total Assistance  Walk Current:  2 - Max Assistance   Walk Description:  (125ft x 1, arjo, SBA, level terrain)  Wheelchair Initial:  1 - Total Assistance  Wheelchair Current:  6 - Modified Independent   Wheelchair Description:  Extra time(Room > gym, mod I )  Stairs Initial:  0 - Not tested,unsafe activity  Stairs Current: 1 - Total Assistance   Stairs Description: (min A with FWW for 4\" platform step x 2 trials. )  Patient/Family Training/Education:  On-going strength/ endurance/ mobility training and joint protection education  DME/DC Recommendations:  20\" lightweight wc/ cushion, FWW with B platform attachments, home health PT, TENS unit  Strengths:  Able to follow instructions, Independent PLOF, Making steady progress towards goals, Manages pain appropriately, Motivated for self care and independence, Pleasant and cooperative, Supportive family and Willingly participates in therapeutic activities  Barriers:   Decreased endurance, Limited mobility and Other: RA/ R knee instability  # of short term goals set= 4  # of short term goals met=4  Physical Therapy Problems     Problem: PT-Long Term " Goals     Dates: Start: 10/18/19       Description:     Goal: LTG-By discharge, patient will propel wheelchair     Dates: Start: 10/18/19       Description: 1) Individualized goal:  Modified independent for household mobility 50 ft x 2 to/from therapies  2) Interventions:  PT Group Therapy, PT E Stim Attended, PT Gait Training, PT Self Care/Home Eval, PT Therapeutic Exercises, PT TENS Application, PT Neuro Re-Ed/Balance, PT Aquatic Therapy, PT Therapeutic Activity, PT Manual Therapy and PT Evaluation             Goal: LTG-By discharge, patient will ambulate     Dates: Start: 10/18/19       Description: 1) Individualized goal:  Modified independent with FWW for household mobility 50 ft x 3  2) Interventions:  PT Group Therapy, PT E Stim Attended, PT Gait Training, PT Self Care/Home Eval, PT Therapeutic Exercises, PT TENS Application, PT Neuro Re-Ed/Balance, PT Aquatic Therapy, PT Therapeutic Activity, PT Manual Therapy and PT Evaluation             Goal: LTG-By discharge, patient will transfer one surface to another     Dates: Start: 10/18/19       Description: 1) Individualized goal:  Modified independent with FWW  2) Interventions:  PT Group Therapy, PT E Stim Attended, PT Gait Training, PT Self Care/Home Eval, PT Therapeutic Exercises, PT TENS Application, PT Neuro Re-Ed/Balance, PT Aquatic Therapy, PT Therapeutic Activity, PT Manual Therapy and PT Evaluation             Goal: LTG-By discharge, patient will ambulate up/down 4-6 stairs     Dates: Start: 10/18/19       Description: 1) Individualized goal:  SBA with hand rail vs SPC  2) Interventions:  PT Group Therapy, PT E Stim Attended, PT Gait Training, PT Self Care/Home Eval, PT Therapeutic Exercises, PT TENS Application, PT Neuro Re-Ed/Balance, PT Aquatic Therapy, PT Therapeutic Activity, PT Manual Therapy and PT Evaluation             Goal: LTG-By discharge, patient will transfer in/out of a car     Dates: Start: 10/18/19       Description: 1) Individualized  goal:  Modified independent with FWW  2) Interventions:  PT Group Therapy, PT E Stim Attended, PT Gait Training, PT Self Care/Home Eval, PT Therapeutic Exercises, PT TENS Application, PT Neuro Re-Ed/Balance, PT Aquatic Therapy, PT Therapeutic Activity, PT Manual Therapy and PT Evaluation                           Section completed by:  Kate Bryant, BSPT, ATP    Activities of Daily Living  Eating Initial:  7 - Independent  Eating Current:  7 - Independent   Eating Description:     Grooming Initial:  5 - Standby Prompting/Supervision or Set-up  Grooming Current:  7 - Independent   Grooming Description:  (seated at sink )  Bathing Initial:  0 - Not tested, patient refused  Bathing Current:  4 - Minimal Assistance   Bathing Description:  Adaptive equipment, Grab bar, Tub bench, Long handled bath tool, Hand held shower, Increased time, Supervision for safety, Set-up of equipment, Initial preparation for task(Patient able to wash, rinse and dry all body parts except requires assist with rinsing/drying buttocks and drying feet. Incorporates sitting and standing with use of grab bar into bathing tasks. )  Upper Body Dressing Initial:  5 - Standby Prompting/Supervision or Set-up  Upper Body Dressing Current:  7 - Independent   Upper Body Dressing Description:  Increased time, Set-up of equipment(Patient donned hospital gown with set-up while seated in w/c )  Lower Body Dressing Initial:  2 - Max Assistance  Lower Body Dressing Current:  5 - Standby Prompting/Supervsion or Set-up   Lower Body Dressing Description:  5 - Standby Prompting/Supervsion or Set-up  Toileting Initial:  2 - Max Assistance  Toileting Current:  7 - Independent   Toileting Description:  Grab bar, Increased time, Supervision for safety  Toilet Transfer Initial:  3 - Moderate Assistance  Toilet Transfer Current:  6 - Modified Independent   Toilet Transfer Description:  6 - Modified Independent  Tub / Shower Transfer Initial:  0 - Not tested, patient  refused  Tub / Shower Transfer Current:  5 - Standby Prompting/Supervision or Set-up   Tub / Shower Transfer Description:  Grab bar, Increased time, Supervision for safety, Set-up of equipment(Patient performs wc <>shower bench txfrs with SBA for safety)  IADL:   Supervision with kitchen mobility   Family Training/Education:  Not yet addressed   DME/DC Recommendations:  Hip kit and leg  ( patient will purchase)      Tub bench vs/ shower seat       Strengths:  Able to follow instructions, Effective communication skills, Good insight into deficits/needs, Independent PLOF, Making steady progress towards goals, Motivated for self care and independence, Pleasant and cooperative, Supportive family and Willingly participates in therapeutic activities  Barriers:  Decreased endurance, Generalized weakness, Poor activity tolerance and Other: increasing anxiety with coming up d/c to home      # of short term goals set= 4    # of short term goals met=3    Occupational Therapy Goals     Problem: Bathing     Dates: Start: 10/22/19       Description:     Goal: STG-Within one week, patient will bathe     Dates: Start: 10/22/19       Description: 1) Individualized Goal:  At a SBA level using DME and AE as needed.   2) Interventions:  OT Orthotics Training, OT E Stim Attended, OT Self Care/ADL, OT Cognitive Skill Dev, OT Manual Ther Technique, OT Neuro Re-Ed/Balance, OT Therapeutic Activity, OT Evaluation and OT Therapeutic Exercise                   Problem: Functional Transfers     Dates: Start: 10/18/19       Description:     Goal: STG-Within one week, patient will transfer to toilet     Dates: Start: 10/18/19       Description: 1) Individualized Goal:  With CGA using commode over toilet, grab bar and FWW as needed  2) Interventions:  OT Group Therapy, OT Self Care/ADL, OT Community Reintegration, OT Manual Ther Technique, OT Neuro Re-Ed/Balance, OT Sensory Int Techniques, OT Therapeutic Activity, OT Evaluation and OT  Therapeutic Exercise             Goal: STG-Within one week, patient will transfer to tub/shower     Dates: Start: 10/22/19       Description: 1) Individualized Goal:  At a SBA level using DME as needed.   2) Interventions:  OT Orthotics Training, OT E Stim Attended, OT Self Care/ADL, OT Cognitive Skill Dev, OT Manual Ther Technique, OT Neuro Re-Ed/Balance, OT Therapeutic Activity, OT Evaluation and OT Therapeutic Exercise                   Problem: OT Long Term Goals     Dates: Start: 10/18/19       Description:     Goal: LTG-By discharge, patient will complete basic self care tasks     Dates: Start: 10/18/19       Description: 1) Individualized Goal:  With mod I using AE/AD/techniques as needed.  2) Interventions:  OT Group Therapy, OT Self Care/ADL, OT Community Reintegration, OT Manual Ther Technique, OT Neuro Re-Ed/Balance, OT Sensory Int Techniques, OT Therapeutic Activity, OT Evaluation and OT Therapeutic Exercise             Goal: LTG-By discharge, patient will perform bathroom transfers     Dates: Start: 10/18/19       Description: 1) Individualized Goal:  Mod I using AE/AD/techniques as needed  2) Interventions:  OT Group Therapy, OT Self Care/ADL, OT Community Reintegration, OT Manual Ther Technique, OT Neuro Re-Ed/Balance, OT Sensory Int Techniques, OT Therapeutic Activity, OT Evaluation and OT Therapeutic Exercise             Goal: LTG-By discharge, patient will complete basic home management     Dates: Start: 10/18/19       Description: 1) Individualized Goal:  (meal prep, laundry, kitchen cleanup) mod I using AE/AD/techniques as needed.  2) Interventions:  OT Group Therapy, OT Self Care/ADL, OT Community Reintegration, OT Manual Ther Technique, OT Neuro Re-Ed/Balance, OT Sensory Int Techniques, OT Therapeutic Activity, OT Evaluation and OT Therapeutic Exercise                   Problem: Toileting     Dates: Start: 10/22/19       Description:     Goal: STG-Within one week, patient will complete  toileting tasks     Dates: Start: 10/22/19       Description: 1) Individualized Goal:  At a min A level using DME as needed.   2) Interventions:  OT Orthotics Training, OT E Stim Attended, OT Self Care/ADL, OT Cognitive Skill Dev, OT Manual Ther Technique, OT Neuro Re-Ed/Balance, OT Therapeutic Activity, OT Evaluation and OT Therapeutic Exercise                         Section completed by:  RACHELE Lenz       Recreation/Community     Leisure Competence Measure  Leisure Awareness: Independent  Leisure Attitude: Independent  Leisure Skills: Modified Independent(will require adaptive equipment for most recreation )  Cultural / Social Behaviors: Independent  Interpersonal Skills: Independent  Community Integration Skills: Independent  Social Contact: Minimal Assist  Community Participation: (mostly socail with family)    Strengths:  Able to follow instructions, Motivated for self care and independence, Pleasant and cooperative, Supportive family and Willingly participates in therapeutic activities  Barriers:  Decreased endurance, Generalized weakness, Limited mobility and Lack of motivation  # of short term goals set=2  # of short term goals met=1    At times requires encouragement to participate out of bed. He will often discount his cognition and require encouragement for participation in cognitive activities. 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 Learning new activities does require min verbal cues. Continue to provide new leisure activities for Pt to participate in following discharge.     Recreation Therapy Problems     Problem: Recreation Therapy     Dates: Start: 10/21/19       Description:     Goal: STG-Within one week, patient will demonstrate a standing tolerance     Dates: Start: 10/21/19       Description: By remaining standing 2 minutes X4 while at the standing mat duel tasking.            Goal: LTG-By discharge, patient will demonstrate a standing tolerance     Dates: Start:  10/21/19       Description: By remaining standing 4 minutes X4 while at the standing mat duel tasking.                        Section completed by:  Kvng Vázquez, CTRS       REHAB-Pharmacy IDT Team Note by Aguilar Moe RPH at 10/28/2019  5:21 PM  Version 1 of 1    Author:  Aguilar Moe RPH Service:  -- Author Type:  Pharmacist    Filed:  10/28/2019  5:22 PM Date of Service:  10/28/2019  5:21 PM Status:  Signed    :  Aguilar Moe RPH (Pharmacist)         Pharmacy   Pharmacy  Antibiotics/Antifungals/Antivirals:  Medication:      Active Orders (From admission, onward)    Ordered     Ordering Provider       Thu Oct 17, 2019 11:48 AM    10/17/19 1148  ceFAZolin (ANCEF) 2 g in  mL IVPB  EVERY 8 HOURS      Keaton Pisano M.D.        Route:         IV  Stop Date:  11/22/2019  Reason: thigh abscess  Antihypertensives/Cardiac:  Medication:    Orders (72h ago, onward)     Start     Ordered    10/18/19 0900  losartan (COZAAR) tablet 25 mg  DAILY      10/17/19 1148    10/17/19 1600  furosemide (LASIX) tablet 20 mg  TWICE DAILY DIURETIC      10/17/19 1148    10/17/19 1148  hydrALAZINE (APRESOLINE) tablet 25 mg  EVERY 8 HOURS PRN      10/17/19 1148              Patient Vitals for the past 24 hrs:   BP Pulse   10/28/19 1348 124/82 (!) 104   10/28/19 0700 104/64 74   10/28/19 0524 102/61 66   10/27/19 1835 113/84 (!) 103     Anticoagulation:  Medication: None                                     Other key medications: A review of the medication list reveals no issues at this time. Patient is currently on antihypertensive(s). Recommend home blood pressure monitoring/recording if antihypertensive(s) regimen(s) continue.    Section completed by: Aguilar Moe RP[[email or web address]]     Attribution Key     AW.1 - Aguilar Moe RPH on 10/28/2019  5:21 PM                    DC Planning    DC destination/dispostion: Home w/ supportive wife to [place].     Referrals: IV abx: [facility]. Rehab w/o [facility] eval.  DME.    DC Needs: DME for patient safety & mobility. HH vs Rehab w/o Walls. IV abx through 11.19.19. MD f/u appts. Family training.    [place] to [place] Anxiety. Functional mobility deficits. Medical co-morbidities. Home status.     Strengths: Supportive wife & family. Motivation. Age.    Section completed by:  Leonor Villagomez R.N.    Summary: gait 50-75ft w/ platform walker, min assist stairs, SPV-mod independent for bed mobility & transfers, SPV-mod independent for LBD, VC & reinforcement for energy conservation & anxiety mgmt.    Physician Summary  STEPHON Pisano MD  participated and led team conference discussion.

## 2019-10-29 NOTE — CARE PLAN
Problem: Safety  Goal: Will remain free from injury  Outcome: PROGRESSING AS EXPECTED   Pt uses call light consistently and appropriately. Waits for assistance does not attempt self transfer this shift. Able to verbalize needs.   Problem: Infection  Goal: Will remain free from infection  Outcome: PROGRESSING AS EXPECTED   Patient in IV   Problem: Pain Management  Goal: Pain level will decrease to patient's comfort goal  Outcome: PROGRESSING AS EXPECTED   Patient reports satisfactory pain control and decrease intensity after pharmacological pain management. ABT therapy, no s/s of any adverse reaction noted. Will continue to monitor.

## 2019-10-29 NOTE — THERAPY
Physical Therapy   Daily Treatment     Patient Name: Richard Hubbard II  Age:  44 y.o., Sex:  male  Medical Record #: [id number]  Today's Date: 10/29/2019     Precautions  Precautions: (P) Weight Bearing As Tolerated Right Lower Extremity, Weight Bearing As Tolerated Left Lower Extremity  Comments: (P) R knee brace/ R knee instability    Subjective    Pt was sitting in w/c upon arrival and agreeable to tx     Objective       10/29/19 1001   Precautions   Precautions Weight Bearing As Tolerated Right Lower Extremity;Weight Bearing As Tolerated Left Lower Extremity   Comments R knee brace/ R knee instability   Interdisciplinary Plan of Care Collaboration   IDT Collaboration with  ;Physical Therapist   Patient Position at End of Therapy Seated;Other (Comments)  (hand off to PT)   Collaboration Comments re: d/c planning, transition of care; PT Virgen regarding conversations/ family training/ equipment (P)    PT Total Time Spent   PT Individual Total Time Spent (Mins) 30   PT Charge Group   PT Therapeutic Activities 2       Extensive discussion with case management regarding the level of assist required at home/ level of assist pt will have from wife and mother in law, rehab without walls vs home health, bath aide assist, shower chair, PFWW, manual w/c with cushion, scheduling OT/PT family training with spouse, community resources     Assessment    Case management to follow up with rehab without walls for a consult, family training to be scheduled tomorrow    Plan    OOB activity tolerance/ progressive gait training with Arjo platform walker vs FWW, joint protection education, LE strength/ core strength training as tolerated, curb step training with // bars vs FWW

## 2019-10-29 NOTE — REHAB-OT IDT TEAM NOTE
Occupational Therapy   Activities of Daily Living  Eating Initial:  7 - Independent  Eating Current:  7 - Independent   Eating Description:     Grooming Initial:  5 - Standby Prompting/Supervision or Set-up  Grooming Current:  7 - Independent   Grooming Description:  (seated at sink )  Bathing Initial:  0 - Not tested, patient refused  Bathing Current:  4 - Minimal Assistance   Bathing Description:  Adaptive equipment, Grab bar, Tub bench, Long handled bath tool, Hand held shower, Increased time, Supervision for safety, Set-up of equipment, Initial preparation for task(Patient able to wash, rinse and dry all body parts except requires assist with rinsing/drying buttocks and drying feet. Incorporates sitting and standing with use of grab bar into bathing tasks. )  Upper Body Dressing Initial:  5 - Standby Prompting/Supervision or Set-up  Upper Body Dressing Current:  7 - Independent   Upper Body Dressing Description:  Increased time, Set-up of equipment(Patient donned hospital gown with set-up while seated in w/c )  Lower Body Dressing Initial:  2 - Max Assistance  Lower Body Dressing Current:  5 - Standby Prompting/Supervsion or Set-up   Lower Body Dressing Description:  5 - Standby Prompting/Supervsion or Set-up  Toileting Initial:  2 - Max Assistance  Toileting Current:  7 - Independent   Toileting Description:  Grab bar, Increased time, Supervision for safety  Toilet Transfer Initial:  3 - Moderate Assistance  Toilet Transfer Current:  6 - Modified Independent   Toilet Transfer Description:  6 - Modified Independent  Tub / Shower Transfer Initial:  0 - Not tested, patient refused  Tub / Shower Transfer Current:  5 - Standby Prompting/Supervision or Set-up   Tub / Shower Transfer Description:  Grab bar, Increased time, Supervision for safety, Set-up of equipment(Patient performs wc <>shower bench txfrs with SBA for safety)  IADL:   Supervision with kitchen mobility   Family Training/Education:  Not yet addressed    DME/DC Recommendations:  Hip kit and leg  ( patient will purchase)      Tub bench vs/ shower seat       Strengths:  Able to follow instructions, Effective communication skills, Good insight into deficits/needs, Independent PLOF, Making steady progress towards goals, Motivated for self care and independence, Pleasant and cooperative, Supportive family and Willingly participates in therapeutic activities  Barriers:  Decreased endurance, Generalized weakness, Poor activity tolerance and Other: increasing anxiety with coming up d/c to home      # of short term goals set= 4    # of short term goals met=3    Occupational Therapy Goals     Problem: Bathing     Dates: Start: 10/22/19       Description:     Goal: STG-Within one week, patient will bathe     Dates: Start: 10/22/19       Description: 1) Individualized Goal:  At a SBA level using DME and AE as needed.   2) Interventions:  OT Orthotics Training, OT E Stim Attended, OT Self Care/ADL, OT Cognitive Skill Dev, OT Manual Ther Technique, OT Neuro Re-Ed/Balance, OT Therapeutic Activity, OT Evaluation and OT Therapeutic Exercise                   Problem: Functional Transfers     Dates: Start: 10/18/19       Description:     Goal: STG-Within one week, patient will transfer to toilet     Dates: Start: 10/18/19       Description: 1) Individualized Goal:  With CGA using commode over toilet, grab bar and FWW as needed  2) Interventions:  OT Group Therapy, OT Self Care/ADL, OT Community Reintegration, OT Manual Ther Technique, OT Neuro Re-Ed/Balance, OT Sensory Int Techniques, OT Therapeutic Activity, OT Evaluation and OT Therapeutic Exercise             Goal: STG-Within one week, patient will transfer to tub/shower     Dates: Start: 10/22/19       Description: 1) Individualized Goal:  At a SBA level using DME as needed.   2) Interventions:  OT Orthotics Training, OT E Stim Attended, OT Self Care/ADL, OT Cognitive Skill Dev, OT Manual Ther Technique, OT Neuro  Re-Ed/Balance, OT Therapeutic Activity, OT Evaluation and OT Therapeutic Exercise                   Problem: OT Long Term Goals     Dates: Start: 10/18/19       Description:     Goal: LTG-By discharge, patient will complete basic self care tasks     Dates: Start: 10/18/19       Description: 1) Individualized Goal:  With mod I using AE/AD/techniques as needed.  2) Interventions:  OT Group Therapy, OT Self Care/ADL, OT Community Reintegration, OT Manual Ther Technique, OT Neuro Re-Ed/Balance, OT Sensory Int Techniques, OT Therapeutic Activity, OT Evaluation and OT Therapeutic Exercise             Goal: LTG-By discharge, patient will perform bathroom transfers     Dates: Start: 10/18/19       Description: 1) Individualized Goal:  Mod I using AE/AD/techniques as needed  2) Interventions:  OT Group Therapy, OT Self Care/ADL, OT Community Reintegration, OT Manual Ther Technique, OT Neuro Re-Ed/Balance, OT Sensory Int Techniques, OT Therapeutic Activity, OT Evaluation and OT Therapeutic Exercise             Goal: LTG-By discharge, patient will complete basic home management     Dates: Start: 10/18/19       Description: 1) Individualized Goal:  (meal prep, laundry, kitchen cleanup) mod I using AE/AD/techniques as needed.  2) Interventions:  OT Group Therapy, OT Self Care/ADL, OT Community Reintegration, OT Manual Ther Technique, OT Neuro Re-Ed/Balance, OT Sensory Int Techniques, OT Therapeutic Activity, OT Evaluation and OT Therapeutic Exercise                   Problem: Toileting     Dates: Start: 10/22/19       Description:     Goal: STG-Within one week, patient will complete toileting tasks     Dates: Start: 10/22/19       Description: 1) Individualized Goal:  At a min A level using DME as needed.   2) Interventions:  OT Orthotics Training, OT E Stim Attended, OT Self Care/ADL, OT Cognitive Skill Dev, OT Manual Ther Technique, OT Neuro Re-Ed/Balance, OT Therapeutic Activity, OT Evaluation and OT Therapeutic Exercise                          Section completed by:  DAYANNA LenzTEMILY, Camelia Mckeon, OT/L

## 2019-10-29 NOTE — THERAPY
"Physical Therapy   Daily Treatment     Patient Name: Richard Hubbard II  Age:  44 y.o., Sex:  male  Medical Record #: [id number]  Today's Date: 10/29/2019     Precautions  Precautions: (P) Weight Bearing As Tolerated Right Lower Extremity, Weight Bearing As Tolerated Left Lower Extremity  Comments: R knee brace/ R knee instability    Subjective    Pt up in wc, willing to participate     Objective       10/29/19 1031   Precautions   Precautions Weight Bearing As Tolerated Right Lower Extremity;Weight Bearing As Tolerated Left Lower Extremity   Bed Mobility    Sit to Stand Supervised   PT Total Time Spent   PT Individual Total Time Spent (Mins) 60   PT Charge Group   PT Gait Training 2   PT Therapeutic Activities 2       FIM Bed/Chair/Wheelchair Transfers Score: 5 - Standby Prompting/Supervision or Set-up  Bed/Chair/Wheelchair Transfers Description:  Adaptive equipment, Increased time, Set-up of equipment    FIM Walking Score:  2 - Max Assistance  Walking Description:  (SBA with Arjo walker for UE joint protection 75 ft x 2)    FIM Wheelchair Score:  6 - Modified Independent  Wheelchair Description:  (modified independent on unit)    FIM Stairs Score:  2 - Max Assistance  Stairs Description:  (min A with FWW for 2 consecutive 6\" rise steps x 2 trials, min A for 8\" curb step with FWW)    Reviewed sequencing with FWW and step-to pattern for steps / curbs as noted. Discussed barriers for home to include narrow doorways, young children and obstacle navigation.  Reviewed joint protection options for platform FWW to limit WB stresses at wrists.    Assessment    Pt with improved gait endurance with Arjo walker, improved step height navigation today    Plan    OOB activity tolerance/ progressive gait training with Arjo platform walker vs FWW, joint protection education, LE strength/ core strength training as tolerated, curb step training with  FWW    "

## 2019-10-29 NOTE — THERAPY
"Occupational Therapy  Daily Treatment     Patient Name: Richard Hubbard II  Age:  44 y.o., Sex:  male  Medical Record #: [id number]  Today's Date: 10/29/2019     Precautions  Precautions: (P) Weight Bearing As Tolerated Right Lower Extremity, Weight Bearing As Tolerated Left Lower Extremity  Comments: (P) R knee brace/ R knee instability    Safety   ADL Safety : Requires Supervision for Safety  Bathroom Safety: Modified Independent  Comments: see FIM for dressing and grooming. Able to perform at a largely SBA to mod I level. However, cont to require assistance for shoes due to swelling of feet, impaired motor control, and pain with bending.     Subjective    \"  I really don't need to put in on \" referring to  Right knee brace. Therapist suggested to wear it  During kitchen mobility    reports he spoke to CM  Regarding d/c issues and felt better than he did in the AM   Objective       10/29/19 1231   Precautions   Precautions Weight Bearing As Tolerated Right Lower Extremity;Weight Bearing As Tolerated Left Lower Extremity   Comments R knee brace/ R knee instability   Sitting Upper Body Exercises   Upper Extremity Bike Minutes / Rest Breaks (See Comments)  (hydro cycle  level 4 x 5 minutes   level 7  x 5 with 3 rests)   Interdisciplinary Plan of Care Collaboration   Patient Position at End of Therapy Seated;Call Light within Reach;Tray Table within Reach      dry transfer in / out of step in shower  With shower chair.  Mod I using grab bar   Dry transfer in / out of tub/shower  With tub bench   Mod I .        Assessment     patient demonstrating difficulty with carry over of application of energy conservation  AE    Use of knee brace  For  Use in the longer term to  Maintain ability to be independent with ADL  IADL tasks.    Reports he will mostly likely use FWW for mobility in the home due to  Small children having toys etc scattered around the house.     Plan  ADL  IADL , related mobility strength/endurance " building   Energy conservation   Explore possible relaxation techniques to address stress.

## 2019-10-29 NOTE — THERAPY
"Occupational Therapy  Daily Treatment     Patient Name: Richard Hubbard II  Age:  44 y.o., Sex:  male  Medical Record #: [id number]  Today's Date: 10/29/2019     Precautions  Precautions: (P) Weight Bearing As Tolerated Right Lower Extremity, Weight Bearing As Tolerated Left Lower Extremity  Comments: (P) R knee brace/ R knee instability    Safety   ADL Safety : Requires Supervision for Safety  Bathroom Safety: (P) Modified Independent  Comments: see FIM for dressing and grooming. Able to perform at a largely SBA to mod I level. However, cont to require assistance for shoes due to swelling of feet, impaired motor control, and pain with bending.     Subjective    \" I just have everything going through my head .\"    Patient verbalized anxiety and feeling overwhelmed prepping for d/c to home.    worried that he will need help and due to where he lives people don't want to drive the distance to help out and he also is struggling asking for and accepting help.      Objective       10/29/19 0901   Precautions   Precautions Weight Bearing As Tolerated Right Lower Extremity;Weight Bearing As Tolerated Left Lower Extremity   Comments R knee brace/ R knee instability   Safety    Bathroom Safety Modified Independent   Interdisciplinary Plan of Care Collaboration   Patient Position at End of Therapy Seated;Edge of Bed   OT Total Time Spent   OT Individual Total Time Spent (Mins) 30   OT Charge Group   OT Self Care / ADL 2       FIM Eating Score:  7 - Independent  Eating Description:       FIM Upper Body Dressin - Independent  Upper Body Dressing Description:       FIM Lower Body Dressing Score:  5 - Standby Prompting/Supervsion or Set-up  Lower Body Dressing Description:  Reacher, Sock aid, Shoe horn, Dressing stick(cues for recall of AE use to don shoes.   don underewear pants and socks  mod I )    FIM Toiletin - Independent  Toileting Description:       FIM Bed/Chair/Wheelchair Transfers Score 6 mod I  FWW and " leg      FIM Toilet Transfer Score:  6 - Modified Independent  Toilet Transfer Description:  Grab bar      Assessment     anxious and overwhelmed with upcoming d/c to home. Verbalizes little support     children to care for and spouse who works.     Plan     ADL  IADL , related mobility strength/endurance building   Energy conservation   Explore possible relaxation techniques to address stress.

## 2019-10-29 NOTE — DISCHARGE PLANNING
Future Appointments   Date Time Provider Department Center   11/12/2019  1:30 PM Gerardo Gutierrez M.D. [facility] None   11/12/2019  5:20 PM MARTÍN Turner [address]   12/10/2019  1:00 PM Pat Valdez M.D. [facility] [address] [facility] [address]

## 2019-10-29 NOTE — DISCHARGE PLANNING
IV abx referral sent to [facility] per choice form.  Referral sent to [facility] per order.  Awaiting responses.

## 2019-10-29 NOTE — REHAB-PHARMACY IDT TEAM NOTE
Pharmacy   Pharmacy  Antibiotics/Antifungals/Antivirals:  Medication:      Active Orders (From admission, onward)    Ordered     Ordering Provider       Thu Oct 17, 2019 11:48 AM    10/17/19 1148  ceFAZolin (ANCEF) 2 g in  mL IVPB  EVERY 8 HOURS      Keaton Pisano M.D.        Route:         IV  Stop Date:  11/22/2019  Reason: thigh abscess  Antihypertensives/Cardiac:  Medication:    Orders (72h ago, onward)     Start     Ordered    10/18/19 0900  losartan (COZAAR) tablet 25 mg  DAILY      10/17/19 1148    10/17/19 1600  furosemide (LASIX) tablet 20 mg  TWICE DAILY DIURETIC      10/17/19 1148    10/17/19 1148  hydrALAZINE (APRESOLINE) tablet 25 mg  EVERY 8 HOURS PRN      10/17/19 1148              Patient Vitals for the past 24 hrs:   BP Pulse   10/28/19 1348 124/82 (!) 104   10/28/19 0700 104/64 74   10/28/19 0524 102/61 66   10/27/19 1835 113/84 (!) 103     Anticoagulation:  Medication: None                                     Other key medications: A review of the medication list reveals no issues at this time. Patient is currently on antihypertensive(s). Recommend home blood pressure monitoring/recording if antihypertensive(s) regimen(s) continue.    Section completed by: Aguilar Moe [facility]

## 2019-10-29 NOTE — REHAB-ACTIVITY IDT TEAM NOTE
ACT   Recreation/Community     Leisure Competence Measure  Leisure Awareness: Independent  Leisure Attitude: Independent  Leisure Skills: Modified Independent(will require adaptive equipment for most recreation )  Cultural / Social Behaviors: Independent  Interpersonal Skills: Independent  Community Integration Skills: Independent  Social Contact: Minimal Assist  Community Participation: (mostly socail with family)    Strengths:  Able to follow instructions, Motivated for self care and independence, Pleasant and cooperative, Supportive family and Willingly participates in therapeutic activities  Barriers:  Decreased endurance, Generalized weakness, Limited mobility and Lack of motivation  # of short term goals set=2  # of short term goals met=1    At times requires encouragement to participate out of bed. He will often discount his cognition and require encouragement for participation in cognitive activities. 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 Learning new activities does require min verbal cues. Continue to provide new leisure activities for Pt to participate in following discharge.     Recreation Therapy Problems     Problem: Recreation Therapy     Dates: Start: 10/21/19       Description:     Goal: STG-Within one week, patient will demonstrate a standing tolerance     Dates: Start: 10/21/19       Description: By remaining standing 2 minutes X4 while at the standing mat duel tasking.            Goal: LTG-By discharge, patient will demonstrate a standing tolerance     Dates: Start: 10/21/19       Description: By remaining standing 4 minutes X4 while at the standing mat duel tasking.                        Section completed by:  VIJAY RuggieroS

## 2019-10-30 PROCEDURE — 700111 HCHG RX REV CODE 636 W/ 250 OVERRIDE (IP): Performed by: PHYSICAL MEDICINE & REHABILITATION

## 2019-10-30 PROCEDURE — 700102 HCHG RX REV CODE 250 W/ 637 OVERRIDE(OP): Performed by: PHYSICAL MEDICINE & REHABILITATION

## 2019-10-30 PROCEDURE — 97535 SELF CARE MNGMENT TRAINING: CPT

## 2019-10-30 PROCEDURE — 97110 THERAPEUTIC EXERCISES: CPT

## 2019-10-30 PROCEDURE — 99232 SBSQ HOSP IP/OBS MODERATE 35: CPT | Performed by: PHYSICAL MEDICINE & REHABILITATION

## 2019-10-30 PROCEDURE — A9270 NON-COVERED ITEM OR SERVICE: HCPCS | Performed by: PHYSICAL MEDICINE & REHABILITATION

## 2019-10-30 PROCEDURE — 97530 THERAPEUTIC ACTIVITIES: CPT

## 2019-10-30 PROCEDURE — 97116 GAIT TRAINING THERAPY: CPT

## 2019-10-30 PROCEDURE — 700101 HCHG RX REV CODE 250: Performed by: PHYSICAL MEDICINE & REHABILITATION

## 2019-10-30 PROCEDURE — 97112 NEUROMUSCULAR REEDUCATION: CPT

## 2019-10-30 PROCEDURE — 770010 HCHG ROOM/CARE - REHAB SEMI PRIVAT*

## 2019-10-30 PROCEDURE — 700105 HCHG RX REV CODE 258: Performed by: PHYSICAL MEDICINE & REHABILITATION

## 2019-10-30 RX ADMIN — Medication 20 ML: at 20:48

## 2019-10-30 RX ADMIN — PREDNISONE 20 MG: 20 TABLET ORAL at 08:38

## 2019-10-30 RX ADMIN — CEFAZOLIN 2 G: 10 INJECTION, POWDER, FOR SOLUTION INTRAVENOUS at 20:50

## 2019-10-30 RX ADMIN — LOSARTAN POTASSIUM 25 MG: 25 TABLET ORAL at 08:39

## 2019-10-30 RX ADMIN — HYDROCODONE BITARTRATE AND ACETAMINOPHEN 1 TABLET: 10; 325 TABLET ORAL at 15:09

## 2019-10-30 RX ADMIN — HYDROCODONE BITARTRATE AND ACETAMINOPHEN 1 TABLET: 10; 325 TABLET ORAL at 08:38

## 2019-10-30 RX ADMIN — ACETAMINOPHEN 500 MG: 500 TABLET, FILM COATED ORAL at 20:48

## 2019-10-30 RX ADMIN — FUROSEMIDE 20 MG: 20 TABLET ORAL at 15:09

## 2019-10-30 RX ADMIN — SALINE NASAL SPRAY 2 SPRAY: 1.5 SOLUTION NASAL at 06:12

## 2019-10-30 RX ADMIN — FERROUS SULFATE TAB 325 MG (65 MG ELEMENTAL FE) 325 MG: 325 (65 FE) TAB at 08:39

## 2019-10-30 RX ADMIN — Medication 20 ML: at 08:41

## 2019-10-30 RX ADMIN — HYDROCODONE BITARTRATE AND ACETAMINOPHEN 1 TABLET: 10; 325 TABLET ORAL at 04:03

## 2019-10-30 RX ADMIN — PREGABALIN 150 MG: 150 CAPSULE ORAL at 15:09

## 2019-10-30 RX ADMIN — CEFAZOLIN 2 G: 10 INJECTION, POWDER, FOR SOLUTION INTRAVENOUS at 06:00

## 2019-10-30 RX ADMIN — OMEPRAZOLE 40 MG: 20 CAPSULE, DELAYED RELEASE ORAL at 08:39

## 2019-10-30 RX ADMIN — OMEPRAZOLE 40 MG: 20 CAPSULE, DELAYED RELEASE ORAL at 20:48

## 2019-10-30 RX ADMIN — PREGABALIN 150 MG: 150 CAPSULE ORAL at 08:39

## 2019-10-30 RX ADMIN — MORPHINE SULFATE 15 MG: 15 TABLET, FILM COATED, EXTENDED RELEASE ORAL at 20:48

## 2019-10-30 RX ADMIN — CEFAZOLIN 2 G: 10 INJECTION, POWDER, FOR SOLUTION INTRAVENOUS at 15:10

## 2019-10-30 RX ADMIN — CALCIUM CITRATE 200 MG (950 MG) TABLET 950 MG: at 08:38

## 2019-10-30 RX ADMIN — VITAMIN D, TAB 1000IU (100/BT) 2000 UNITS: 25 TAB at 08:39

## 2019-10-30 RX ADMIN — HYDROCODONE BITARTRATE AND ACETAMINOPHEN 1 TABLET: 10; 325 TABLET ORAL at 18:29

## 2019-10-30 RX ADMIN — FUROSEMIDE 20 MG: 20 TABLET ORAL at 05:59

## 2019-10-30 RX ADMIN — HYDROCODONE BITARTRATE AND ACETAMINOPHEN 1 TABLET: 10; 325 TABLET ORAL at 21:39

## 2019-10-30 RX ADMIN — NICOTINE 7 MG: 7 PATCH, EXTENDED RELEASE TRANSDERMAL at 05:57

## 2019-10-30 RX ADMIN — PREGABALIN 150 MG: 150 CAPSULE ORAL at 20:48

## 2019-10-30 RX ADMIN — ACETAMINOPHEN 500 MG: 500 TABLET, FILM COATED ORAL at 15:09

## 2019-10-30 RX ADMIN — MORPHINE SULFATE 15 MG: 15 TABLET, FILM COATED, EXTENDED RELEASE ORAL at 15:09

## 2019-10-30 RX ADMIN — MORPHINE SULFATE 15 MG: 15 TABLET, FILM COATED, EXTENDED RELEASE ORAL at 08:38

## 2019-10-30 ASSESSMENT — PAIN SCALES - WONG BAKER: WONGBAKER_NUMERICALRESPONSE: HURTS EVEN MORE

## 2019-10-30 NOTE — CARE PLAN
Problem: Pain Management  Goal: Pain level will decrease to patient's comfort goal  Outcome: PROGRESSING AS EXPECTED  Note:   Pt requested PRN norco this morning for generalized pain. Pt verbalized relief upon reassessment.      Problem: Infection  Goal: Will remain free from infection  Note:   Pt continues on IV Ancef Q8 for osteomyelitis. No acute signs and symptoms of infection noted to pt.

## 2019-10-30 NOTE — DISCHARGE PLANNING
Met w/ patient this morning to review DC plans in progress: HH & DME referrals, VANE strange MD f/u appts. Requesting referral to [facility] for evaluation. Will provide detailed list of DC planning in process for patient to review (visual learning). Reviewed DC date updated to 11.2.19. Wife will be coming in Wednesday for family training. Questions answered. Emotional support provided.

## 2019-10-30 NOTE — THERAPY
"Occupational Therapy  Daily Treatment     Patient Name: Richard Hubbard II  Age:  44 y.o., Sex:  male  Medical Record #: [id number]  Today's Date: 10/30/2019     Precautions  Precautions: (P) Weight Bearing As Tolerated Left Lower Extremity, Weight Bearing As Tolerated Right Lower Extremity  Comments: (P) R knee brace/ R knee instability    Safety   ADL Safety : Modified Independent  Bathroom Safety: Modified Independent  Comments: see FIM for dressing and grooming. Able to perform at a largely SBA to mod I level. However, cont to require assistance for shoes due to swelling of feet, impaired motor control, and pain with bending.     Subjective    \" Thank you \" stated at end of session .      Objective       10/30/19 1401   Precautions   Precautions Weight Bearing As Tolerated Left Lower Extremity;Weight Bearing As Tolerated Right Lower Extremity   Comments R knee brace/ R knee instability   Interdisciplinary Plan of Care Collaboration   IDT Collaboration with  Family / Caregiver   Patient Position at End of Therapy Seated;Family / Friend in Room   Collaboration Comments  spouse Gerardo present for family training  provided verbal instruct on patients current functional status for self  ( mod I dressing bathing toileting and toilet transfer supervision for  shower transfer )    brought up to both   tub shower and tub bench vs  walk in shower with seat and  need for grab bars in which area  patient will chose to bathe.   both were relatively indecisive regarding  which area will be used for bathing  due to  other family members using  one of the bathrooms .   reiterated to both they needed to  communicate and problem solve and make choice.  pointed out  tub bench in tub was safest and easiest transfer for patient.     issued and reviewed energy conservation technique hand out for ADL and IADL tasks.    educated regarding the importance of  applying the techniques  especially planning  daily activity and taking " restbreaks  as to assure ability to get through day  with expending all his energy in AM and not being able to attend to ADL and IADL needs in pm .  both verbalized understanding.    OT Total Time Spent   OT Individual Total Time Spent (Mins) 30   OT Charge Group   OT Therapy Activity 2      Firm redirection that  Supervision is recommended with  Shower transfer       Assessment    Spouse amenable to verbal instructions given .  Both needed redirection to  Address one issue at a time as  During discussion regarding DME for bathing needs  Topic switched to toileting and  Raised toilet seat vs.  BSC over toilet.     Plan    ADL  IADL , related mobility strength/endurance building   Energy conservation   Explore possible relaxation techniques to address stress.

## 2019-10-30 NOTE — DISCHARGE PLANNING
Per Annika at [facility], DME referral accepted and will be delivered on Friday, 11/1/19.  Per Dannielle at [facility], IB abx referral accepted and will be delivered on 11/1/19.  Ivette will do patient/family teaching on Friday, 11/1 at 1000.  Per Mary at [facility], patient will have large out-of-pocket copay.  She will contact patient's wife to discuss.  Home health referral sent to [facility] per choice form.  Awaiting response.

## 2019-10-30 NOTE — CARE PLAN
Problem: Pain Management  Goal: Pain level will decrease to patient's comfort goal  Outcome: PROGRESSING AS EXPECTED  Note:   Pt requested PRN norco this morning for generalized pain. Pt verbalized relief upon reassessment.

## 2019-10-30 NOTE — THERAPY
"Occupational Therapy  Daily Treatment     Patient Name: Richard Hubbard II  Age:  44 y.o., Sex:  male  Medical Record #: [id number]  Today's Date: 10/30/2019     Precautions  Precautions: (P) Weight Bearing As Tolerated Right Lower Extremity, Weight Bearing As Tolerated Left Lower Extremity  Comments: (P) R knee brace/ R knee instability    Safety   ADL Safety : (P) Modified Independent  Bathroom Safety: (P) Modified Independent  Comments: see FIM for dressing and grooming. Able to perform at a largely SBA to mod I level. However, cont to require assistance for shoes due to swelling of feet, impaired motor control, and pain with bending.     Subjective    \" It just locked up  I can't open it .\" referring to right hand       Therapist performed  Right wrist extension stretch  After which patient reported improved movement.      Objective       10/30/19 0933   Precautions   Precautions Weight Bearing As Tolerated Right Lower Extremity;Weight Bearing As Tolerated Left Lower Extremity   Comments R knee brace/ R knee instability   Safety    ADL Safety  Modified Independent   Bathroom Safety Modified Independent   Non Verbal Descriptors   Non Verbal Scale  Calm   Interdisciplinary Plan of Care Collaboration   IDT Collaboration with  Physical Therapist Assistant (PTA)   Patient Position at End of Therapy Seated;Call Light within Reach;Tray Table within Reach   Collaboration Comments  please apply left wrist brace for trial use during next tx    OT Total Time Spent   OT Individual Total Time Spent (Mins) 60   OT Charge Group   OT Self Care / ADL 4       FIM Grooming Score:  7 - Independent  Grooming Description:  ( seated oral care and shave )    FIM Bathing Score:  6 - Modified Independent  Bathing Description:       FIM Upper Body Dressin - Independent  Upper Body Dressing Description:       FIM Lower Body Dressing Score:  6 - Modified Independent  Lower Body Dressing Description:  Reacher, Sock aid, Shoe horn, " "Dressing stick    FIM Tub/Shower Transfers Score:  5 - Standby Prompting/Supervision or Set-up  Tub/Shower Transfers Description:  Grab bar(indirect supervision )      Assessment    Improved independence    Having difficulty with right hand.\" locking up.\"  Issued ice pack x 15 minutes followed by use of wrist brace at 1100 PT tx     Plan    Family training in PM  "

## 2019-10-30 NOTE — THERAPY
Physical Therapy   Daily Treatment     Patient Name: Richard Hubbard II  Age:  44 y.o., Sex:  male  Medical Record #: [id number]  Today's Date: 10/30/2019     Precautions  Precautions: (P) Weight Bearing As Tolerated Left Lower Extremity, Weight Bearing As Tolerated Right Lower Extremity  Comments: (P) R knee brace/ R knee instability    Subjective    Pt seated EOB upon arrival, Ice on R wrist. Dr Pisano entered room to speak with the patient  Pt mentioned to the  about his R wrist/nn pain     Objective       10/30/19 1101   Precautions   Precautions Weight Bearing As Tolerated Left Lower Extremity;Weight Bearing As Tolerated Right Lower Extremity   Comments R knee brace/ R knee instability   Sitting Lower Body Exercises   Ankle Pumps 3 sets of 10;Bilateral   Long Arc Quad 3 sets of 10;Bilateral   Marching 3 sets of 10   Bed Mobility    Scooting Modified Independent   Interdisciplinary Plan of Care Collaboration   IDT Collaboration with  Physician;Certified O.T. Assistant  (DUBON)   Patient Position at End of Therapy Seated;Self Releasing Lap Belt Applied;Other (Comments)  (pt self propelled wc to room for lunch)   Collaboration Comments MD: susan garrison, DUBON: R Wrist brace   PT Total Time Spent   PT Individual Total Time Spent (Mins) 30   PT Charge Group   PT Therapeutic Exercise 1   PT Therapeutic Activities 1   Supervising Physical Therapist Virgen Bryant       FIM Wheelchair Score:  6 - Modified Independent  Wheelchair Description:  Extra time    Assessment    Pt tolerated seated LE exercises as indicated above. Pt indicated that he has been thinking about the entry to his home and thinks he want to try to enter through front door vs garage as has been previously been discussed with his primary PT.    Plan    OOB activity tolerance/ progressive gait training with Arjo platform walker vs FWW, joint protection education, LE strength/ core strength training as tolerated, curb step training with  FWW

## 2019-10-30 NOTE — CARE PLAN
Problem: Safety  Goal: Will remain free from falls  Intervention: Implement fall precautions  Note:   Pt appropriately uses call light to make needs known.Bed in low position, non skid socks/shoes on when out of bed.Call light within reach.Will continue to monitor and assess needs and safety.     Problem: Infection  Goal: Will remain free from infection  Intervention: Assess for removal of potential routes of infection, such as IV, central line, intra-arterial or urinary catheters  Note:   PICC to right upper arm is intact and patent.Scheduled Ancef given with no adverse reaction noted.Will continue to monitor.     Problem: Bowel/Gastric:  Goal: Normal bowel function is maintained or improved  Intervention: Educate patient and significant other/support system about signs and symptoms of constipation and interventions to implement  Note:   Pt is continent of bowel.LBM 10/29.Will continue to monitor and assess for s/sx of constipation.     Problem: Pain Management  Goal: Pain level will decrease to patient's comfort goal  Intervention: Follow pain managment plan developed in collaboration with patient and Interdisciplinary Team  Note:   Medicated per request for pain with good effect.Repositioned with pillows for comfort.Will continue to monitor and assess pain level and medicate as needed.

## 2019-10-30 NOTE — PROGRESS NOTES
"Rehab Progress Note     Encounter Date: 10/30/2019    CC: left pelvic fracture, pain, RA    Interval Events (Subjective)  Patient sitting up in room. He reports therapy is going well. He reports that he appreciates that we changed bowel medications to PRN. He would like [facility]. Discussed that [facility] does not contract always with them but can have CM look into it. Denies NVD. Denies SOB    IDT Team Meeting 10/29/2019  DC/Disposition:  11/2/19    Objective:  VITAL SIGNS: /75   Pulse (!) 102   Temp 36.7 °C (98 °F) (Oral)   Resp 18   Ht 1.803 m (5' 11\") Comment: per patient  Wt 99.1 kg (218 lb 7.6 oz)   SpO2 98%   BMI 30.47 kg/m²   Gen: NAD  Psych: Mood and affect appropriate  CV: RRR, no edema  Resp: CTAB, no upper airway sounds  Abd: NTND  Neuro: AOx4, 5/5 BUE    No results found for this or any previous visit (from the past 72 hour(s)).    Current Facility-Administered Medications   Medication Frequency   • senna-docusate (PERICOLACE or SENOKOT S) 8.6-50 MG per tablet 2 Tab BID PRN    And   • polyethylene glycol/lytes (MIRALAX) PACKET 1 Packet QDAY PRN    And   • magnesium hydroxide (MILK OF MAGNESIA) suspension 30 mL QDAY PRN    And   • bisacodyl (DULCOLAX) suppository 10 mg QDAY PRN   • pregabalin (LYRICA) capsule 150 mg TID   • predniSONE (DELTASONE) tablet 20 mg DAILY   • nicotine (NICODERM) 7 MG/24HR 7 mg Daily-0600    And   • nicotine polacrilex (NICORETTE) 2 MG piece 2 mg Q HOUR PRN   • HYDROcodone-acetaminophen (NORCO) 5-325 MG per tablet 1 Tab Q3HRS PRN   • HYDROcodone/acetaminophen (NORCO)  MG per tablet 1 Tab Q3HRS PRN   • acetaminophen (TYLENOL) tablet 500 mg TID   • vitamin D (cholecalciferol) tablet 2,000 Units DAILY   • normal saline PF 20 mL Q12HRS   • Respiratory Care per Protocol Continuous RT   • tramadol (ULTRAM) 50 MG tablet 50 mg Q4HRS PRN   • hydrALAZINE (APRESOLINE) tablet 25 mg Q8HRS PRN   • acetaminophen (TYLENOL) tablet 650 mg Q4HRS PRN   • artificial tears " ophthalmic solution 1 Drop PRN   • benzocaine-menthol (CEPACOL) lozenge 1 Lozenge Q2HRS PRN   • mag hydrox-al hydrox-simeth (MAALOX PLUS ES or MYLANTA DS) suspension 20 mL Q2HRS PRN   • ondansetron (ZOFRAN ODT) dispertab 4 mg 4X/DAY PRN    Or   • ondansetron (ZOFRAN) syringe/vial injection 4 mg 4X/DAY PRN   • traZODone (DESYREL) tablet 50 mg QHS PRN   • sodium chloride (OCEAN) 0.65 % nasal spray 2 Spray PRN   • calcium citrate (CALCITRATE) tablet 950 mg DAILY   • ceFAZolin (ANCEF) 2 g in  mL IVPB Q8HRS   • ferrous sulfate tablet 325 mg DAILY   • furosemide (LASIX) tablet 20 mg BID DIURETIC   • losartan (COZAAR) tablet 25 mg DAILY   • morphine ER (MS CONTIN) tablet 15 mg TID   • omeprazole (PRILOSEC) capsule 40 mg BID   • simethicone (MYLICON) chewable tab 80 mg TID PRN       Orders Placed This Encounter   Procedures   • Diet Order Regular     Standing Status:   Standing     Number of Occurrences:   1     Order Specific Question:   Diet:     Answer:   Regular [1]       Assessment:  Active Hospital Problems    Diagnosis   • *Pubic ramus fracture (HCC)   • Rheumatoid arthritis (HCC)   • Iron deficiency anemia, unspecified iron deficiency anemia type   • MONICA (obstructive sleep apnea)   • Depression       Medical Decision Making and Plan:  Left pelvic fracture - managed conservatively, found to have abscess in thigh status post drainage.   -PT and OT for mobility and ADLs  -Follow-up with Orthopedics     Pain - Patient with left hip and right knee. Was supposed to get right knee replacement prior to falls and hospitalization.  -On Morphine 15 mg BID, Lyrica 25 mg TID, and scheduled Tylenol. With Norco PRN 5 to 10 mg - trial instead of Oxycodone. Discussed risks and benefits.   -Increase Lyrica to 150 mg TID, no change in symptoms. Continue to monitor, he reports confusion on Gabapentin     Left thigh mass/osteomyelitis - s/p drainage with MSSA growing from cultures.    -Continue Ancef 2 g q8h until 11/22/19.   Recommending weekly CBC and CMP - schedule for Fridays.   -WBC 11.5 on 10/25/19, most likely steroids. Continue to monitor, afebrile      Fungal rash - Patient with diffuse fungal rash. Patient s/p fluconazole for 10 days. Per pharmacy no miconazole cream currently. Will switch to clotrimazole cream BID  -Discontinue cream as resolved.      HTN/AS - Patient on Losartan 25 mg daily, Lasix 20 mg BID   -Held due to low SBP, adjust parameters.      Anemia - Patient on Fe supplement on transfer. Check AM CBC - 8.3, stable. Check Iron panel in AM  -9.3, iron panel normal.      RA - Patient previously on DMARD, now on hold while has active osteomyelitis. On Predinsone 40 mg daily  -Titrate down to 20 mg      Nicotine dependence - On patch on transfer. Will need teaching.   -Reduce Nicotine patch. Counseling performed on 10/24/19      GERD - History of PUD. Continue Omeprazole.     Vitamin D deficiency - 1000 U on transfer. Check level - 20.  Increase to 2000 U daily     DVT ppx - Patient on Lovenox on transfer. Walking > 100 feet, discontinue Lovenox    Dispo - Patient with worsening right wrist pain due to overuse. Would benefit from additional day of therapy to work on technique. Plan for discharge on Saturday.     Total time:  25 minutes.  I spent greater than 50% of the time for patient care, counseling, and coordination on this date, including unit/floor time, and face-to-face time with the patient as per interval events and assessment and plan above. Topics discussed included wrist pain improving, will have follow-up with PM&R, and improved pain control.  Family to come in for training tomorrow. Looking into Rehab without [facility].     Keaton Pisano M.D.

## 2019-10-31 DIAGNOSIS — M79.641 RIGHT HAND PAIN: ICD-10-CM

## 2019-10-31 PROCEDURE — 97110 THERAPEUTIC EXERCISES: CPT

## 2019-10-31 PROCEDURE — 97530 THERAPEUTIC ACTIVITIES: CPT

## 2019-10-31 PROCEDURE — 700101 HCHG RX REV CODE 250: Performed by: PHYSICAL MEDICINE & REHABILITATION

## 2019-10-31 PROCEDURE — 700105 HCHG RX REV CODE 258: Performed by: PHYSICAL MEDICINE & REHABILITATION

## 2019-10-31 PROCEDURE — 770010 HCHG ROOM/CARE - REHAB SEMI PRIVAT*

## 2019-10-31 PROCEDURE — 99232 SBSQ HOSP IP/OBS MODERATE 35: CPT | Performed by: PHYSICAL MEDICINE & REHABILITATION

## 2019-10-31 PROCEDURE — 700111 HCHG RX REV CODE 636 W/ 250 OVERRIDE (IP): Performed by: PHYSICAL MEDICINE & REHABILITATION

## 2019-10-31 PROCEDURE — A9270 NON-COVERED ITEM OR SERVICE: HCPCS | Performed by: PHYSICAL MEDICINE & REHABILITATION

## 2019-10-31 PROCEDURE — 97116 GAIT TRAINING THERAPY: CPT

## 2019-10-31 PROCEDURE — 700102 HCHG RX REV CODE 250 W/ 637 OVERRIDE(OP): Performed by: PHYSICAL MEDICINE & REHABILITATION

## 2019-10-31 PROCEDURE — 97535 SELF CARE MNGMENT TRAINING: CPT

## 2019-10-31 RX ORDER — PREDNISONE 5 MG/1
10 TABLET ORAL DAILY
Status: DISCONTINUED | OUTPATIENT
Start: 2019-11-01 | End: 2019-11-02 | Stop reason: HOSPADM

## 2019-10-31 RX ADMIN — OMEPRAZOLE 40 MG: 20 CAPSULE, DELAYED RELEASE ORAL at 08:42

## 2019-10-31 RX ADMIN — PREGABALIN 150 MG: 150 CAPSULE ORAL at 19:59

## 2019-10-31 RX ADMIN — FERROUS SULFATE TAB 325 MG (65 MG ELEMENTAL FE) 325 MG: 325 (65 FE) TAB at 08:43

## 2019-10-31 RX ADMIN — MORPHINE SULFATE 15 MG: 15 TABLET, FILM COATED, EXTENDED RELEASE ORAL at 08:43

## 2019-10-31 RX ADMIN — CEFAZOLIN 2 G: 10 INJECTION, POWDER, FOR SOLUTION INTRAVENOUS at 21:59

## 2019-10-31 RX ADMIN — HYDROCODONE BITARTRATE AND ACETAMINOPHEN 1 TABLET: 10; 325 TABLET ORAL at 14:15

## 2019-10-31 RX ADMIN — HYDROCODONE BITARTRATE AND ACETAMINOPHEN 1 TABLET: 10; 325 TABLET ORAL at 08:42

## 2019-10-31 RX ADMIN — Medication 20 ML: at 22:03

## 2019-10-31 RX ADMIN — FUROSEMIDE 20 MG: 20 TABLET ORAL at 14:12

## 2019-10-31 RX ADMIN — FUROSEMIDE 20 MG: 20 TABLET ORAL at 05:28

## 2019-10-31 RX ADMIN — HYDROCODONE BITARTRATE AND ACETAMINOPHEN 1 TABLET: 10; 325 TABLET ORAL at 21:40

## 2019-10-31 RX ADMIN — PREDNISONE 20 MG: 20 TABLET ORAL at 08:43

## 2019-10-31 RX ADMIN — OMEPRAZOLE 40 MG: 20 CAPSULE, DELAYED RELEASE ORAL at 19:58

## 2019-10-31 RX ADMIN — CALCIUM CITRATE 200 MG (950 MG) TABLET 950 MG: at 08:43

## 2019-10-31 RX ADMIN — CEFAZOLIN 2 G: 10 INJECTION, POWDER, FOR SOLUTION INTRAVENOUS at 05:29

## 2019-10-31 RX ADMIN — CEFAZOLIN 2 G: 10 INJECTION, POWDER, FOR SOLUTION INTRAVENOUS at 14:27

## 2019-10-31 RX ADMIN — LOSARTAN POTASSIUM 25 MG: 25 TABLET ORAL at 08:42

## 2019-10-31 RX ADMIN — NICOTINE 7 MG: 7 PATCH, EXTENDED RELEASE TRANSDERMAL at 05:29

## 2019-10-31 RX ADMIN — Medication 20 ML: at 08:45

## 2019-10-31 RX ADMIN — PREGABALIN 150 MG: 150 CAPSULE ORAL at 14:12

## 2019-10-31 RX ADMIN — PREGABALIN 150 MG: 150 CAPSULE ORAL at 08:43

## 2019-10-31 RX ADMIN — ACETAMINOPHEN 500 MG: 500 TABLET, FILM COATED ORAL at 14:12

## 2019-10-31 RX ADMIN — MORPHINE SULFATE 15 MG: 15 TABLET, FILM COATED, EXTENDED RELEASE ORAL at 19:58

## 2019-10-31 RX ADMIN — MORPHINE SULFATE 15 MG: 15 TABLET, FILM COATED, EXTENDED RELEASE ORAL at 14:12

## 2019-10-31 RX ADMIN — ACETAMINOPHEN 500 MG: 500 TABLET, FILM COATED ORAL at 19:58

## 2019-10-31 RX ADMIN — HYDROCODONE BITARTRATE AND ACETAMINOPHEN 1 TABLET: 10; 325 TABLET ORAL at 18:04

## 2019-10-31 RX ADMIN — HYDROCODONE BITARTRATE AND ACETAMINOPHEN 1 TABLET: 10; 325 TABLET ORAL at 05:29

## 2019-10-31 RX ADMIN — VITAMIN D, TAB 1000IU (100/BT) 2000 UNITS: 25 TAB at 08:42

## 2019-10-31 ASSESSMENT — PAIN SCALES - WONG BAKER: WONGBAKER_NUMERICALRESPONSE: HURTS EVEN MORE

## 2019-10-31 NOTE — THERAPY
"Physical Therapy   Daily Treatment     Patient Name: Richard Hubbard II  Age:  44 y.o., Sex:  male  Medical Record #: [id number]  Today's Date: 10/31/2019     Precautions  Precautions: Weight Bearing As Tolerated Right Lower Extremity, Weight Bearing As Tolerated Left Lower Extremity  Comments: R knee brace/ R knee instability    Subjective    Pt resting in bed, willing to participate     Objective       10/31/19 1600   Supine Lower Body Exercise   Pelvic Tilt 3 sets of 10   Hip Adduction  3 sets of 10  (JONATHAN squeeze)   Short Arc Quad 3 sets of 10   PT Total Time Spent   PT Individual Total Time Spent (Mins) 60   PT Charge Group   PT Gait Training 2   PT Therapeutic Exercise 1   PT Therapeutic Activities 1       FIM Bed/Chair/Wheelchair Transfers Score: 6 - Modified Independent  Bed/Chair/Wheelchair Transfers Description:       FIM Walking Score:  2 - Max Assistance  Walking Description:  (SBA with Arjo platform walker 60 ft x 2 + 120 ft x 1)    FIM Wheelchair Score:  6 - Modified Independent  Wheelchair Description:       FIM Stairs Score:  2 - Max Assistance  Stairs Description:  (min A with FWW for 6\" curb steps x 4)    Pt completed transfer from <>car with FWW and SPV/ set-up    Assessment    Pt remains with limited endurance/ activity tolerance with gait due to pain/ weakness and joint instability/ RA but demonstrates modified independence at  level    Plan    OOB activity tolerance/ progressive gait training with Arjo platform walker vs FWW, joint protection education, LE strength/ core strength training as tolerated, curb step training with  FWW    "

## 2019-10-31 NOTE — THERAPY
Occupational Therapy  Daily Treatment     Patient Name: Richard Hubbard II  Age:  44 y.o., Sex:  male  Medical Record #: [id number]  Today's Date: 10/31/2019     Precautions  Precautions: Weight Bearing As Tolerated Left Lower Extremity, Weight Bearing As Tolerated Right Lower Extremity  Comments: R knee brace/ R knee instability    Safety   ADL Safety : Modified Independent  Bathroom Safety: Modified Independent  Comments: see FIM for dressing and grooming. Able to perform at a largely SBA to mod I level. However, cont to require assistance for shoes due to swelling of feet, impaired motor control, and pain with bending.     Subjective         Objective       10/29/19 1231   Precautions   Precautions Weight Bearing As Tolerated Right Lower Extremity;Weight Bearing As Tolerated Left Lower Extremity   Comments R knee brace/ R knee instability   Sitting Upper Body Exercises   Upper Extremity Bike Minutes / Rest Breaks (See Comments)  (hydro cycle  level 4 x 5 minutes   level 7  x 5 with 3 rests)   IADL Treatments   Kitchen Mobility Education Mod I with FWW     Interdisciplinary Plan of Care Collaboration   Patient Position at End of Therapy Seated;Call Light within Reach;Tray Table within Reach   OT Total Time Spent   OT Individual Total Time Spent (Mins) 90   OT Charge Group   OT Self Care / ADL 1   OT Therapy Activity 2   OT Therapeutic Exercise  1       FIM Lower Body Dressing Score:  6 - Modified Independent  Lower Body Dressing Description:   Doff/ don shoes       Dry transfer  In / out of  Tub /shower using tub bench  Supervision   Dry transfer in / out of walk in shower   Grab bars FWW and shower seat  Supervision     Assessment    Patient unsure  Which AE he will use for bathing at home .    Plan     ADL  IADL related mobility, strength/endurance building

## 2019-10-31 NOTE — THERAPY
"Occupational Therapy  Daily Treatment     Patient Name: Richard Hubbard II  Age:  44 y.o., Sex:  male  Medical Record #: [id number]  Today's Date: 10/31/2019     Precautions  Precautions: Weight Bearing As Tolerated Right Lower Extremity, Weight Bearing As Tolerated Left Lower Extremity  Comments: R knee brace/ R knee instability    Safety   ADL Safety : Modified Independent  Bathroom Safety: Modified Independent  Comments: see FIM for dressing and grooming. Able to perform at a largely SBA to mod I level. However, cont to require assistance for shoes due to swelling of feet, impaired motor control, and pain with bending.     Subjective    \" I can't hold it that way it hurts too much .\" referring to  Full hand grasp on bar for weighted pulley exs.  Adapted grasp utilized by patient      Objective       10/31/19 1301   Precautions   Precautions Weight Bearing As Tolerated Right Lower Extremity;Weight Bearing As Tolerated Left Lower Extremity   Comments R knee brace/ R knee instability   Sitting Upper Body Exercises   Upper Extremity Bike Level 4 Resistance  (x 7 minutes  hydrocycle )   Other Exercise weighted pulley exs.  pulldowns with adapted right grasp ( index , middle finger and thumb, ) due to pain if using full grasp   x 10 reps x 3 .    pull backs  ( full grasp  x 10 reps x 3     20lbs         FIM Lower Body Dressing Score:  6 - Modified Independent  Lower Body Dressing Description:  (don shoes )    FIM Bed/Chair/Wheelchair Transfers Score: 6 - Modified Independent  Bed/Chair/Wheelchair Transfers Description:  (provided cue to push up with left hand and right on FWW  due to  increased right wrist pain today. )    FIM Wheelchair Score:  6 - Modified Independent  Wheelchair Description:  (room to gym )      Assessment     continues with increased right wrist pain this PM    Patient reports he will apply ice pack  Post tx session     Plan     ADL routine in AM     "

## 2019-10-31 NOTE — THERAPY
"Physical Therapy   Daily Treatment     Patient Name: Richard Hubbard II  Age:  44 y.o., Sex:  male  Medical Record #: [id number]  Today's Date: 10/31/2019     Precautions  Precautions: Weight Bearing As Tolerated Left Lower Extremity, Weight Bearing As Tolerated Right Lower Extremity  Comments: R knee brace/ R knee instability    Subjective    Pt ready for therapy. \" I think im going to just walk the extra twenty feet and go in through the front door\"     Objective       10/30/19 1301   Precautions   Precautions Weight Bearing As Tolerated Right Lower Extremity;Weight Bearing As Tolerated Left Lower Extremity   Comments R knee brace/ R knee instability   Neuro-Muscular Treatments   Neuro-Muscular Treatments Sequencing   Comments VC for sequencing for step/stair training   Interdisciplinary Plan of Care Collaboration   IDT Collaboration with  Family / Caregiver;Physical Therapist   Patient Position at End of Therapy Seated;Self Releasing Lap Belt Applied;Family / Friend in Room   Collaboration Comments Spouse Gerardo present observed pt completing step/curb transfer using FWW    PT Total Time Spent   PT Individual Total Time Spent (Mins) 60   PT Charge Group   PT Gait Training 1   PT Neuromuscular Re-Education / Balance 1   PT Therapeutic Activities 2   Supervising Physical Therapist Kate Bryant       Step training with FWW as follows:   - ascended/descended 6\" step + 4\" threshold step with FWW wedged between step(mimic entry through garage) CGA/Adore for balance and safety  - ascended/descended 8\" step(mimic entry through front door) with FWW CGA/Adore for balance and safety    Pt completed each set up x 3 trials, 2 with therapist and 1 with therapist and spouse observing set up    Assisted with picking correct platforms for pts FWW    Assessment    Pt required CGA/Adore for steadying and verbal cues approx 10% of the time for correct sequencing on the step/stairs. Pt's spouse did not participate in hands-on " "training, she only observed the session and assisted with some problem solving. She received education and returned verbal comprehension on step/stair sequencing \"up with good, down with bad\" Therapist continues to recommend the installation of grab bars for entry through garage or entry through front door for safety. Pt and spouse agree and plan to have grab bars installed in the near future. Pt will be ordering B plat forms for his FWW due to limited ability to weight bear through wrists for extended time    Plan    OOB activity tolerance/ progressive gait training with Arjo platform walker vs FWW, joint protection education, LE strength/ core strength training as tolerated, curb step training with  FWW        "

## 2019-10-31 NOTE — DISCHARGE PLANNING
"Met w/ patient & wife, Katiana, to review current DC planning in process. States \"the rehab [facility] person said there were concerns about insurance coverage & cancelled the eval for today.\" Explained Rehab w/o [facility] is OON for insurance, [facility] would need to be obtained for service, rehab w/o [facility] quoted 50% co-payment w/o [facility] for in-network status, 25% co-payment w/ VINCENZO. Reviewed therapy team recommendation for HH referral: home mgmt. Patient & wife request referral to [facility]. [facility] will be here for patient training on Friday at 10am. Updated regarding DME referral & coverage. Wife states \"I've taken every Thursday & Friday off in November.\" Requesting change to MD appts to either Wednesday, Thursday or Fridays in November if available. Discussed DMV handicap placard. Paperwork completed. Questions answered. Emotional support provided.  "

## 2019-10-31 NOTE — CARE PLAN
Problem: Safety  Goal: Will remain free from injury  Note:   Pt remains free from accidental injury.Appropriately uses call light to make needs known.MOD I in the room.Fall precautions in place.Call light within reach.Will continue to monitor and assess needs and safety.     Problem: Pain Management  Goal: Pain level will decrease to patient's comfort goal  Intervention: Follow pain managment plan developed in collaboration with patient and Interdisciplinary Team  Note:   Medicated per request for pain with good effect.Repositions self frequently while in bed for comfort.Will continue to monitor and assess pain level and medicate as needed.

## 2019-10-31 NOTE — THERAPY
Physical Therapy   Daily Treatment     Patient Name: Richard Hubbard II  Age:  44 y.o., Sex:  male  Medical Record #: [id number]  Today's Date: 10/31/2019     Precautions  Precautions: (P) Weight Bearing As Tolerated Right Lower Extremity, Weight Bearing As Tolerated Left Lower Extremity  Comments: (P) R knee brace/ R knee instability    Subjective    Pt presented to main gym ready for PT session      Objective       10/31/19 1101   Precautions   Precautions Weight Bearing As Tolerated Right Lower Extremity;Weight Bearing As Tolerated Left Lower Extremity   Comments R knee brace/ R knee instability   Interdisciplinary Plan of Care Collaboration   Patient Position at End of Therapy Seated;Self Releasing Lap Belt Applied   PT Total Time Spent   PT Individual Total Time Spent (Mins) 30   PT Charge Group   PT Therapeutic Exercise 2   Supervising Physical Therapist Virgen Bryant       Pt completed UE exercises in seated with #4lb and #6lb weighted ball on re bounder for 2 sets 50 repetitions of each     Pt completed B UE bicep curl and horz shoulder abd with #2lb and #4lb 3 sets x 10 reps each    Assessment    The patient tolerated rebounder and additional UE exercises with extended rest breaks between sets    Plan    OOB activity tolerance/ progressive gait training with [employer] platform walker vs FWW, joint protection education, LE strength/ core strength training as tolerated, curb step training with  FWW

## 2019-10-31 NOTE — THERAPY
"Occupational Therapy  Daily Treatment     Patient Name: Richard Hubbard II  Age:  44 y.o., Sex:  male  Medical Record #: [id number]  Today's Date: 10/31/2019     Precautions  Precautions: (P) Weight Bearing As Tolerated Left Lower Extremity, Weight Bearing As Tolerated Right Lower Extremity  Comments: (P) R knee brace/ R knee instability    Safety   ADL Safety : Modified Independent  Bathroom Safety: Modified Independent  Comments: see FIM for dressing and grooming. Able to perform at a largely SBA to mod I level. However, cont to require assistance for shoes due to swelling of feet, impaired motor control, and pain with bending.     Subjective    \" I guess I just have to have the surgery.\" referring to left hand fucntion     Objective       10/31/19 0931   Precautions   Precautions Weight Bearing As Tolerated Left Lower Extremity;Weight Bearing As Tolerated Right Lower Extremity   Comments R knee brace/ R knee instability   Interdisciplinary Plan of Care Collaboration   Patient Position at End of Therapy Seated  (with Dr Pisano )   OT Total Time Spent   OT Individual Total Time Spent (Mins) 60   OT Charge Group   OT Self Care / ADL 4       FIM Grooming Score:  7 - Independent  Grooming Description:       FIM Upper Body Dressin - Modified Independent  Upper Body Dressing Description:       FIM Lower Body Dressing Score:  6 - Modified Independent  Lower Body Dressing Description:       FIM Toiletin - Modified Independent  Toileting Description:       FIM Toilet Transfer Score:  6 - Modified Independent  Toilet Transfer Description:  Grab bar(FWW)         Right wrist joint mobilization and stretch      Assessment     increased limitation of right hand use  .   Cues for use of reacher with left hand to decrease joint stress on right     Plan     cont. ADL  IADL , related mobility strength/endurance building     "

## 2019-11-01 DIAGNOSIS — G89.29 CHRONIC PAIN OF RIGHT KNEE: ICD-10-CM

## 2019-11-01 DIAGNOSIS — M25.561 CHRONIC PAIN OF RIGHT KNEE: ICD-10-CM

## 2019-11-01 LAB
ALBUMIN SERPL BCP-MCNC: 3 G/DL (ref 3.2–4.9)
ALBUMIN/GLOB SERPL: 1.3 G/DL
ALP SERPL-CCNC: 64 U/L (ref 30–99)
ALT SERPL-CCNC: 9 U/L (ref 2–50)
ANION GAP SERPL CALC-SCNC: 8 MMOL/L (ref 0–11.9)
AST SERPL-CCNC: 15 U/L (ref 12–45)
BASOPHILS # BLD AUTO: 1 % (ref 0–1.8)
BASOPHILS # BLD: 0.07 K/UL (ref 0–0.12)
BILIRUB SERPL-MCNC: 0.2 MG/DL (ref 0.1–1.5)
BUN SERPL-MCNC: 12 MG/DL (ref 8–22)
CALCIUM SERPL-MCNC: 8.1 MG/DL (ref 8.5–10.5)
CHLORIDE SERPL-SCNC: 106 MMOL/L (ref 96–112)
CO2 SERPL-SCNC: 27 MMOL/L (ref 20–33)
COMMENT 1642: NORMAL
CREAT SERPL-MCNC: 0.68 MG/DL (ref 0.5–1.4)
EOSINOPHIL # BLD AUTO: 0.1 K/UL (ref 0–0.51)
EOSINOPHIL NFR BLD: 1.4 % (ref 0–6.9)
ERYTHROCYTE [DISTWIDTH] IN BLOOD BY AUTOMATED COUNT: 73.9 FL (ref 35.9–50)
GLOBULIN SER CALC-MCNC: 2.4 G/DL (ref 1.9–3.5)
GLUCOSE SERPL-MCNC: 49 MG/DL (ref 65–99)
HCT VFR BLD AUTO: 33 % (ref 42–52)
HGB BLD-MCNC: 9.9 G/DL (ref 14–18)
IMM GRANULOCYTES # BLD AUTO: 0.16 K/UL (ref 0–0.11)
IMM GRANULOCYTES NFR BLD AUTO: 2.2 % (ref 0–0.9)
LYMPHOCYTES # BLD AUTO: 2.75 K/UL (ref 1–4.8)
LYMPHOCYTES NFR BLD: 38.1 % (ref 22–41)
MCH RBC QN AUTO: 29.1 PG (ref 27–33)
MCHC RBC AUTO-ENTMCNC: 30 G/DL (ref 33.7–35.3)
MCV RBC AUTO: 97.1 FL (ref 81.4–97.8)
MONOCYTES # BLD AUTO: 0.76 K/UL (ref 0–0.85)
MONOCYTES NFR BLD AUTO: 10.5 % (ref 0–13.4)
MORPHOLOGY BLD-IMP: NORMAL
NEUTROPHILS # BLD AUTO: 3.38 K/UL (ref 1.82–7.42)
NEUTROPHILS NFR BLD: 46.8 % (ref 44–72)
NRBC # BLD AUTO: 0 K/UL
NRBC BLD-RTO: 0 /100 WBC
PLATELET # BLD AUTO: 197 K/UL (ref 164–446)
PMV BLD AUTO: 9.7 FL (ref 9–12.9)
POTASSIUM SERPL-SCNC: 4 MMOL/L (ref 3.6–5.5)
PROT SERPL-MCNC: 5.4 G/DL (ref 6–8.2)
RBC # BLD AUTO: 3.4 M/UL (ref 4.7–6.1)
SODIUM SERPL-SCNC: 141 MMOL/L (ref 135–145)
WBC # BLD AUTO: 7.2 K/UL (ref 4.8–10.8)

## 2019-11-01 PROCEDURE — 770010 HCHG ROOM/CARE - REHAB SEMI PRIVAT*

## 2019-11-01 PROCEDURE — 700111 HCHG RX REV CODE 636 W/ 250 OVERRIDE (IP): Performed by: PHYSICAL MEDICINE & REHABILITATION

## 2019-11-01 PROCEDURE — 700102 HCHG RX REV CODE 250 W/ 637 OVERRIDE(OP): Performed by: PHYSICAL MEDICINE & REHABILITATION

## 2019-11-01 PROCEDURE — 97116 GAIT TRAINING THERAPY: CPT

## 2019-11-01 PROCEDURE — 97530 THERAPEUTIC ACTIVITIES: CPT

## 2019-11-01 PROCEDURE — 700101 HCHG RX REV CODE 250: Performed by: PHYSICAL MEDICINE & REHABILITATION

## 2019-11-01 PROCEDURE — 99233 SBSQ HOSP IP/OBS HIGH 50: CPT | Performed by: PHYSICAL MEDICINE & REHABILITATION

## 2019-11-01 PROCEDURE — A9270 NON-COVERED ITEM OR SERVICE: HCPCS | Performed by: PHYSICAL MEDICINE & REHABILITATION

## 2019-11-01 PROCEDURE — 97110 THERAPEUTIC EXERCISES: CPT

## 2019-11-01 PROCEDURE — 85025 COMPLETE CBC W/AUTO DIFF WBC: CPT

## 2019-11-01 PROCEDURE — 700105 HCHG RX REV CODE 258: Performed by: PHYSICAL MEDICINE & REHABILITATION

## 2019-11-01 PROCEDURE — 97535 SELF CARE MNGMENT TRAINING: CPT

## 2019-11-01 PROCEDURE — 80053 COMPREHEN METABOLIC PANEL: CPT

## 2019-11-01 RX ORDER — PREDNISONE 10 MG/1
TABLET ORAL
Qty: 7 TAB | Refills: 0 | Status: SHIPPED | OUTPATIENT
Start: 2019-11-01 | End: 2019-11-05

## 2019-11-01 RX ORDER — PREGABALIN 150 MG/1
150 CAPSULE ORAL 3 TIMES DAILY
Qty: 90 CAP | Refills: 2 | Status: SHIPPED | OUTPATIENT
Start: 2019-11-01 | End: 2019-12-11

## 2019-11-01 RX ORDER — OMEPRAZOLE 40 MG/1
40 CAPSULE, DELAYED RELEASE ORAL DAILY
Qty: 30 CAP | Refills: 2 | Status: SHIPPED | OUTPATIENT
Start: 2019-11-01 | End: 2020-10-12 | Stop reason: SDUPTHER

## 2019-11-01 RX ORDER — LOSARTAN POTASSIUM 25 MG/1
25 TABLET ORAL DAILY
Qty: 30 TAB | Refills: 2 | Status: SHIPPED | OUTPATIENT
Start: 2019-11-01 | End: 2020-10-20

## 2019-11-01 RX ORDER — MORPHINE SULFATE 15 MG/1
15 TABLET, FILM COATED, EXTENDED RELEASE ORAL 3 TIMES DAILY
Qty: 42 TAB | Refills: 0 | Status: SHIPPED | OUTPATIENT
Start: 2019-11-01 | End: 2019-11-15

## 2019-11-01 RX ORDER — FUROSEMIDE 20 MG/1
20 TABLET ORAL 2 TIMES DAILY
Qty: 60 TAB | Refills: 1 | Status: SHIPPED | OUTPATIENT
Start: 2019-11-01 | End: 2020-03-10

## 2019-11-01 RX ORDER — HYDROCODONE BITARTRATE AND ACETAMINOPHEN 10; 325 MG/1; MG/1
1 TABLET ORAL EVERY 6 HOURS PRN
Qty: 42 TAB | Refills: 0 | Status: SHIPPED | OUTPATIENT
Start: 2019-11-01 | End: 2019-11-15

## 2019-11-01 RX ADMIN — FERROUS SULFATE TAB 325 MG (65 MG ELEMENTAL FE) 325 MG: 325 (65 FE) TAB at 08:17

## 2019-11-01 RX ADMIN — PREDNISONE 10 MG: 5 TABLET ORAL at 08:16

## 2019-11-01 RX ADMIN — MORPHINE SULFATE 15 MG: 15 TABLET, FILM COATED, EXTENDED RELEASE ORAL at 08:17

## 2019-11-01 RX ADMIN — PREGABALIN 150 MG: 150 CAPSULE ORAL at 08:16

## 2019-11-01 RX ADMIN — CEFAZOLIN 2 G: 10 INJECTION, POWDER, FOR SOLUTION INTRAVENOUS at 21:28

## 2019-11-01 RX ADMIN — Medication 20 ML: at 08:20

## 2019-11-01 RX ADMIN — ACETAMINOPHEN 500 MG: 500 TABLET, FILM COATED ORAL at 21:27

## 2019-11-01 RX ADMIN — OMEPRAZOLE 40 MG: 20 CAPSULE, DELAYED RELEASE ORAL at 08:17

## 2019-11-01 RX ADMIN — HYDROCODONE BITARTRATE AND ACETAMINOPHEN 1 TABLET: 10; 325 TABLET ORAL at 19:53

## 2019-11-01 RX ADMIN — MORPHINE SULFATE 15 MG: 15 TABLET, FILM COATED, EXTENDED RELEASE ORAL at 21:26

## 2019-11-01 RX ADMIN — HYDROCODONE BITARTRATE AND ACETAMINOPHEN 1 TABLET: 10; 325 TABLET ORAL at 12:40

## 2019-11-01 RX ADMIN — HYDROCODONE BITARTRATE AND ACETAMINOPHEN 1 TABLET: 10; 325 TABLET ORAL at 15:47

## 2019-11-01 RX ADMIN — CEFAZOLIN 2 G: 10 INJECTION, POWDER, FOR SOLUTION INTRAVENOUS at 14:34

## 2019-11-01 RX ADMIN — FUROSEMIDE 20 MG: 20 TABLET ORAL at 14:35

## 2019-11-01 RX ADMIN — HYDROCODONE BITARTRATE AND ACETAMINOPHEN 1 TABLET: 10; 325 TABLET ORAL at 04:55

## 2019-11-01 RX ADMIN — ACETAMINOPHEN 500 MG: 500 TABLET, FILM COATED ORAL at 14:35

## 2019-11-01 RX ADMIN — LOSARTAN POTASSIUM 25 MG: 25 TABLET ORAL at 08:17

## 2019-11-01 RX ADMIN — CEFAZOLIN 2 G: 10 INJECTION, POWDER, FOR SOLUTION INTRAVENOUS at 05:46

## 2019-11-01 RX ADMIN — MORPHINE SULFATE 15 MG: 15 TABLET, FILM COATED, EXTENDED RELEASE ORAL at 14:35

## 2019-11-01 RX ADMIN — SALINE NASAL SPRAY 2 SPRAY: 1.5 SOLUTION NASAL at 08:15

## 2019-11-01 RX ADMIN — PREGABALIN 150 MG: 150 CAPSULE ORAL at 21:26

## 2019-11-01 RX ADMIN — FUROSEMIDE 20 MG: 20 TABLET ORAL at 05:46

## 2019-11-01 RX ADMIN — Medication 20 ML: at 21:28

## 2019-11-01 RX ADMIN — PREGABALIN 150 MG: 150 CAPSULE ORAL at 14:35

## 2019-11-01 RX ADMIN — OMEPRAZOLE 40 MG: 20 CAPSULE, DELAYED RELEASE ORAL at 21:27

## 2019-11-01 RX ADMIN — HYDROCODONE BITARTRATE AND ACETAMINOPHEN 1 TABLET: 10; 325 TABLET ORAL at 08:16

## 2019-11-01 RX ADMIN — VITAMIN D, TAB 1000IU (100/BT) 2000 UNITS: 25 TAB at 08:17

## 2019-11-01 RX ADMIN — NICOTINE 7 MG: 7 PATCH, EXTENDED RELEASE TRANSDERMAL at 05:46

## 2019-11-01 RX ADMIN — CALCIUM CITRATE 200 MG (950 MG) TABLET 950 MG: at 08:16

## 2019-11-01 ASSESSMENT — ACTIVITIES OF DAILY LIVING (ADL)
SHOWER_TRANSFER_LEVEL_OF_ASSIST: REQUIRES SUPERVISION WITH SHOWER TRANSFER
TOILETING_LEVEL_OF_ASSIST: ABLE TO COMPLETE TOILETING WITHOUT ASSIST
TOILET_TRANSFER_LEVEL_OF_ASSIST: ABLE TO COMPLETE TOILET TRANSFER WITHOUT ASSIST

## 2019-11-01 NOTE — DISCHARGE SUMMARY
Rehab Discharge Summary    Admission Date: 10/17/2019    Discharge Date: 11/2/2019    Attending Provider: Keaton Pisano MD/PhD    Admission Diagnosis:   Active Hospital Problems    Diagnosis   • *Pubic ramus fracture (HCC)   • Rheumatoid arthritis (HCC)   • Iron deficiency anemia, unspecified iron deficiency anemia type   • MONICA (obstructive sleep apnea)   • Depression       Discharge Diagnosis:  Active Hospital Problems    Diagnosis   • *Pubic ramus fracture (HCC)   • Rheumatoid arthritis (HCC)   • Iron deficiency anemia, unspecified iron deficiency anemia type   • MONICA (obstructive sleep apnea)   • Depression       HPI per H&P:  Patient is a 44 y.o. male  with a past medical history of aortic root dilation and moderate aortic stenosis, PUD, rheumatoid arthritis on Humira and chronically immunosuppressed, admitted to [facility] on 9/13, with anemia and pelvic pain. He was scheduled for a total knee replacement on the right prior to this admission. He had CT here which showed left pubic ramus fracture and two large hematomas. 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 MRI spine with mild chronic compression fracture of T11 and paraspinal soft tissue contusions on the lumbar spine. He is WBAT for his pelvic fracture, managed non-operatively.  Patient found to have multiple fluid collections on imaging which were concerning for an abscess, and underwent CT-guided drainage on 10/2 with cultures growing MSSA.  Patient underwent surgical debridement on 10/9, pathology consistent with osteomyelitis, on IV antibiotics with stop date of 11/19/2019. Patient has since been switched to IV Ancef q8h on 10/12/19. He also reports systemic fungal infection over his chest as well as his groin.  Per ID recommending weekly CBC and CMPs while on antibiotics.     Patient was admitted to [facility] on 10/17/2019.     Hospital Course by Problem List:  Left  pelvic fracture - managed conservatively, found to have abscess in thigh status post drainage. Patient underwent acute inpatient rehabilitation from 10/17/19 to 11/2/19 with good improvement in mobility and ADLs.  -Follow-up with Orthopedics     Pain - Patient with left hip and right knee. Was supposed to get right knee replacement prior to falls and hospitalization.  -On Morphine 15 mg BID, Lyrica 25 mg TID, and scheduled Tylenol. With Norco PRN 5 to 10 mg - trial instead of Oxycodone. Discussed risks and benefits.   -Increase Lyrica to 150 mg TID, no change in symptoms. Continue to monitor, he reports confusion on Gabapentin  I discussed the risks and benefits of using opiate medications for pain control.  I discussed the risk of addiction, potential for overdose, and respiratory depression (and the potential need for opiate antagonist therapy if this occurs).  I encouraged the patient to take this medication sparingly with the expressed goal of weaning off the medication as soon as is clinically appropriate.  I informed the patient that we are only able to provide a 14 day supply of these medications at discharge and that they will be responsible for requesting any refills needed from their primary care provider or their surgeon.  We discussed the need to safely secure these medications to prevent theft, inadvertent ingestion, or misuse.  Any unused medication should be immediately disposed of through a sanctioned medication disposal program.  We discussed adjunctive pain medications and conservative therapies at length.    I answered the patient's questions regarding this treatment, and the patient indicated understanding and willingness to proceed.     Left thigh mass/osteomyelitis - s/p drainage with MSSA growing from cultures.  WBC 11.5 on 10/25/19, most likely steroids. Continue to monitor, afebrile. WBC 7.2 on 11/1/19  -Continue Ancef 2 g q8h until 11/19/19 (per last ID note on 10/17/1).  Follow-up ID       Fungal rash - Patient with diffuse fungal rash. Patient s/p fluconazole for 10 days. Per pharmacy no miconazole cream currently. Will switch to clotrimazole cream BID. Discontinue cream as resolved.      HTN/AS - Patient on Losartan 25 mg daily, Lasix 20 mg BID      Anemia - Patient on Fe supplement on transfer. Check AM CBC - 8.3, stable. Check Iron panel in AM. 9.3, iron panel normal. 9.9 on repeat on 19     RA - Patient previously on DMARD, now on hold while has active osteomyelitis. On Predinsone 40 mg daily  -Titrate down to 10 mg for 7 days at discharge     Nicotine dependence - On patch on transfer. Will need teaching. Reduce Nicotine patch. Counseling performed on 10/24/19      GERD - History of PUD. Continue Omeprazole.     Vitamin D deficiency - 1000 U on transfer. Check level - 20.  Increase to 2000 U daily     DVT ppx - Patient on Lovenox on transfer. Walking > 100 feet, discontinue Lovenox     Functional Status at Discharge  Eatin - Independent  Eating Description:     Groomin - Independent  Grooming Description:  ( seated oral care and shave )  Bathin - Modified Independent  Bathing Description:  Grab bar, Long handled bath tool, Hand held shower, Tub bench  Upper Body Dressin - Modified Independent  Upper Body Dressing Description:  (at w/c level )  Lower Body Dressin - Modified Independent  Lower Body Dressing Description:  6 - Modified Independent  Discharge Location : Home  Patient Discharging with Assist of: Family   Level of Supervision Required: No Supervision  Recommended Equipment for Discharge: Platform Walker;Tub Transfer Bench;Shower Chair;Grab Bars in Tub / Shower;Hand Held Shower Head;Sock Aid;Reacher;Long Handled Shoe Horn;Dressing Stick  Recommended Services Upon Discharge: Home Health Occupational Therapy  Long Term Goals Met: Mod I  basic self care  , related transfer  and home management tasks   Criteria for Termination of Services: Maximum Function  "Achieved for Inpatient Rehabilitation  Walk:  2 - Max Assistance  Distance Walked:  Walks  feet  Walk Description:  (CGA with platform walker (Arjo walker) x 60 ft)  Wheelchair:  6 - Modified Independent  Distance Propelled:  Propels a minimum of 150 feet   Wheelchair Description:  Extra time  Stairs 2 - Max Assistance  Stairs Description(min A for two 6\" consecutive steps with FWW + two 6\" platform steps with FWW. )     Comprehension Mode:  Both  Comprehension:  6 - Modified Independent  Comprehension Description:  Glasses  Expression Mode:  Vocal  Expression:  7 - Independent  Expression Description:     Social Interaction:  7 - Independent  Social Interaction Description:     Problem Solvin - Independent  Problem Solving Description:     Memory:  7 - Independent  Memory Description:  Verbal cueing, Supervision, Therapy schedule       I, Keaton Pisano M.D., personally performed a complete drug regimen review and no potential clinically significant medication issues were identified.   Discharge Medication:     Medication List      START taking these medications      Instructions   HYDROcodone/acetaminophen  MG Tabs  Commonly known as:  NORCO   Take 1 Tab by mouth every 6 hours as needed for up to 14 days.  Dose:  1 Tab        CHANGE how you take these medications      Instructions   furosemide 20 MG Tabs  What changed:  when to take this  Commonly known as:  LASIX   Doctor's comments:  0800 and 1400  Take 1 Tab by mouth 2 times a day.  Dose:  20 mg     omeprazole 40 MG delayed-release capsule  What changed:  when to take this  Commonly known as:  PRILOSEC   Take 1 Cap by mouth every day.  Dose:  40 mg     predniSONE 10 MG Tabs  What changed:    · medication strength  · additional instructions  Commonly known as:  DELTASONE   Take daily for 7 days     pregabalin 150 MG Caps  What changed:    · medication strength  · how much to take  Commonly known as:  LYRICA   Take 1 Cap by mouth 3 " times a day for 30 days.  Dose:  150 mg        CONTINUE taking these medications      Instructions   acetaminophen 500 MG Tabs  Commonly known as:  TYLENOL   Take 2 Tabs by mouth 3 times a day.  Dose:  1,000 mg     Cholecalciferol 1000 UNIT Tabs  Commonly known as:  VITAMIND D3   Take 1 Tab by mouth every day.  Dose:  1,000 Units     losartan 25 MG Tabs  Commonly known as:  COZAAR   Take 1 Tab by mouth every day.  Dose:  25 mg     Misc. Devices Misc   One shower chair Dx:Z74.09, Z91.81     morphine ER 15 MG Tbcr tablet  Commonly known as:  MS CONTIN   Take 1 Tab by mouth 3 times a day for 14 days.  Dose:  15 mg     NS SOLN 100 mL with ceFAZolin 10 GM SOLR 2 g   2 g by Intravenous route every 8 hours for 33 days.  Dose:  2 g        STOP taking these medications    calcium citrate 950 MG Tabs  Commonly known as:  CALCITRATE     enoxaparin 40 MG/0.4ML Soln inj  Commonly known as:  LOVENOX     miconazole 2 % Crea  Commonly known as:  MICOTIN     nicotine 14 MG/24HR Pt24  Commonly known as:  NICODERM     nicotine polacrilex 2 MG Gum  Commonly known as:  NICORETTE     ondansetron 4 MG Tbdp  Commonly known as:  ZOFRAN ODT     oxyCODONE immediate release 10 MG immediate release tablet  Commonly known as:  ROXICODONE     senna-docusate 8.6-50 MG Tabs  Commonly known as:  PERICOLACE or SENOKOT S     simethicone 80 MG Chew  Commonly known as:  MYLICON            Discharge Diet:  Regular    Discharge Activity:  As tolerated     Disposition:  Patient to discharge home with family support and community resources.     Equipment:  FWW, WC    Follow-up & Discharge Instructions:  Follow up with your primary care provider (PCP) within 7-10 days of discharge to review your medications and take over your care.     If you develop chest pain, fever, chills, change in neurologic function (weakness, sensation changes, vision changes), or other concerning sxs, seek immediate medical attention or call 911.      Condition on  Discharge:  Deena Pisano M.D.    This note is for documentation purposes only.

## 2019-11-01 NOTE — THERAPY
"Occupational Therapy  Daily Treatment     Patient Name: Richard Hubbard II  Age:  44 y.o., Sex:  male  Medical Record #: [id number]  Today's Date: 2019     Precautions  Precautions: (P) Weight Bearing As Tolerated Right Lower Extremity, Weight Bearing As Tolerated Left Lower Extremity  Comments: (P) R knee brace/ R knee instability    Safety   ADL Safety : Modified Independent  Bathroom Safety: Modified Independent  Comments: see FIM for dressing and grooming. Able to perform at a largely SBA to mod I level. However, cont to require assistance for shoes due to swelling of feet, impaired motor control, and pain with bending.     Subjective    \"  Than you .\"  Stated at end of session      Objective       19 0701   Precautions   Precautions Weight Bearing As Tolerated Right Lower Extremity;Weight Bearing As Tolerated Left Lower Extremity   Comments R knee brace/ R knee instability   Interdisciplinary Plan of Care Collaboration   Patient Position at End of Therapy In Bed;Call Light within Reach;Tray Table within Reach   OT Total Time Spent   OT Individual Total Time Spent (Mins) 60   OT Charge Group   OT Self Care / ADL 4       FIM Eating Score:  7 - Independent  Eating Description:       FIM Grooming Score:  7 - Independent  Grooming Description:       FIM Bathing Score:  6 - Modified Independent  Bathing Description:       FIM Upper Body Dressin - Modified Independent  Upper Body Dressing Description:  (at w/c level )    FIM Lower Body Dressing Score:  6 - Modified Independent  Lower Body Dressing Description:  Reacher, Sock aid, Dressing stick, Shoe horn(at w/c level )    FIM Toiletin - Modified Independent  Toileting Description:  Grab bar    FIM Bed/Chair/Wheelchair Transfers Score: 6 - Modified Independent  Bed/Chair/Wheelchair Transfers Description:       FIM Toilet Transfer Score:  6 - Modified Independent  Toilet Transfer Description:  Grab bar    FIM Tub/Shower Transfers Score:  6 - " Modified Independent  Tub/Shower Transfers Description:  Grab bar      Assessment     mod I at w/c level in room  Using AE for LB dressing     Plan  Kitchen Activity in PM using FWW or w/c  Per patient choice   D/c home with  OT and PT services to follow 11-2-19

## 2019-11-01 NOTE — PROGRESS NOTES
"Rehab Progress Note     Encounter Date: 10/31/2019    CC: left pelvic fracture, pain, RA    Interval Events (Subjective)  Patient sitting up in room. He reports therapy is going well except for his right wrist. He reports still limited extension of fingers. Denies NVD. Denies SOB.     IDT Team Meeting 10/29/2019  DC/Disposition:  11/2/19    Objective:  VITAL SIGNS: /71   Pulse 80   Temp 36.5 °C (97.7 °F) (Oral)   Resp 18   Ht 1.803 m (5' 11\") Comment: per patient  Wt 99.1 kg (218 lb 7.6 oz)   SpO2 97%   BMI 30.47 kg/m²   Gen: NAD  Psych: Mood and affect appropriate  CV: RRR, no edema  Resp: CTAB, no upper airway sounds  Abd: NTND  Neuro: AOx4, limited finger extension on right, 3/5 wrist extension    No results found for this or any previous visit (from the past 72 hour(s)).    Current Facility-Administered Medications   Medication Frequency   • senna-docusate (PERICOLACE or SENOKOT S) 8.6-50 MG per tablet 2 Tab BID PRN    And   • polyethylene glycol/lytes (MIRALAX) PACKET 1 Packet QDAY PRN    And   • magnesium hydroxide (MILK OF MAGNESIA) suspension 30 mL QDAY PRN    And   • bisacodyl (DULCOLAX) suppository 10 mg QDAY PRN   • pregabalin (LYRICA) capsule 150 mg TID   • predniSONE (DELTASONE) tablet 20 mg DAILY   • nicotine (NICODERM) 7 MG/24HR 7 mg Daily-0600    And   • nicotine polacrilex (NICORETTE) 2 MG piece 2 mg Q HOUR PRN   • HYDROcodone-acetaminophen (NORCO) 5-325 MG per tablet 1 Tab Q3HRS PRN   • HYDROcodone/acetaminophen (NORCO)  MG per tablet 1 Tab Q3HRS PRN   • acetaminophen (TYLENOL) tablet 500 mg TID   • vitamin D (cholecalciferol) tablet 2,000 Units DAILY   • normal saline PF 20 mL Q12HRS   • Respiratory Care per Protocol Continuous RT   • tramadol (ULTRAM) 50 MG tablet 50 mg Q4HRS PRN   • hydrALAZINE (APRESOLINE) tablet 25 mg Q8HRS PRN   • acetaminophen (TYLENOL) tablet 650 mg Q4HRS PRN   • artificial tears ophthalmic solution 1 Drop PRN   • benzocaine-menthol (CEPACOL) lozenge 1 " Lozenge Q2HRS PRN   • mag hydrox-al hydrox-simeth (MAALOX PLUS ES or MYLANTA DS) suspension 20 mL Q2HRS PRN   • ondansetron (ZOFRAN ODT) dispertab 4 mg 4X/DAY PRN    Or   • ondansetron (ZOFRAN) syringe/vial injection 4 mg 4X/DAY PRN   • traZODone (DESYREL) tablet 50 mg QHS PRN   • sodium chloride (OCEAN) 0.65 % nasal spray 2 Spray PRN   • calcium citrate (CALCITRATE) tablet 950 mg DAILY   • ceFAZolin (ANCEF) 2 g in  mL IVPB Q8HRS   • ferrous sulfate tablet 325 mg DAILY   • furosemide (LASIX) tablet 20 mg BID DIURETIC   • losartan (COZAAR) tablet 25 mg DAILY   • morphine ER (MS CONTIN) tablet 15 mg TID   • omeprazole (PRILOSEC) capsule 40 mg BID   • simethicone (MYLICON) chewable tab 80 mg TID PRN       Orders Placed This Encounter   Procedures   • Diet Order Regular     Standing Status:   Standing     Number of Occurrences:   1     Order Specific Question:   Diet:     Answer:   Regular [1]       Assessment:  Active Hospital Problems    Diagnosis   • *Pubic ramus fracture (HCC)   • Rheumatoid arthritis (HCC)   • Iron deficiency anemia, unspecified iron deficiency anemia type   • MONICA (obstructive sleep apnea)   • Depression       Medical Decision Making and Plan:  Left pelvic fracture - managed conservatively, found to have abscess in thigh status post drainage.   -PT and OT for mobility and ADLs  -Follow-up with Orthopedics     Pain - Patient with left hip and right knee. Was supposed to get right knee replacement prior to falls and hospitalization.  -On Morphine 15 mg BID, Lyrica 25 mg TID, and scheduled Tylenol. With Norco PRN 5 to 10 mg - trial instead of Oxycodone. Discussed risks and benefits.   -Increase Lyrica to 150 mg TID, no change in symptoms. Continue to monitor, he reports confusion on Gabapentin     Left thigh mass/osteomyelitis - s/p drainage with MSSA growing from cultures.    -Continue Ancef 2 g q8h until 11/22/19.  Recommending weekly CBC and CMP - schedule for Fridays.   -WBC 11.5 on  10/25/19, most likely steroids. Continue to monitor, afebrile      Fungal rash - Patient with diffuse fungal rash. Patient s/p fluconazole for 10 days. Per pharmacy no miconazole cream currently. Will switch to clotrimazole cream BID  -Discontinue cream as resolved.      HTN/AS - Patient on Losartan 25 mg daily, Lasix 20 mg BID   -Held due to low SBP, adjust parameters.      Anemia - Patient on Fe supplement on transfer. Check AM CBC - 8.3, stable. Check Iron panel in AM  -9.3, iron panel normal.      RA - Patient previously on DMARD, now on hold while has active osteomyelitis. On Predinsone 40 mg daily  -Titrate down to 10 mg      Nicotine dependence - On patch on transfer. Will need teaching.   -Reduce Nicotine patch. Counseling performed on 10/24/19      GERD - History of PUD. Continue Omeprazole.     Vitamin D deficiency - 1000 U on transfer. Check level - 20.  Increase to 2000 U daily     DVT ppx - Patient on Lovenox on transfer. Walking > 100 feet, discontinue Lovenox    Dispo - Patient with worsening right wrist pain due to overuse. Would benefit from additional day of therapy to work on technique. Plan for discharge on Saturday.     Total time:  25 minutes.  I spent greater than 50% of the time for patient care, counseling, and coordination on this date, including unit/floor time, and face-to-face time with the patient as per interval events and assessment and plan above. Topics discussed included discharge planning, reduce steroids, and check CBC/CMP    Keaton Pisano M.D.

## 2019-11-01 NOTE — THERAPY
Physical Therapy   Daily Treatment     Patient Name: Richard Hubbard II  Age:  44 y.o., Sex:  male  Medical Record #: [id number]  Today's Date: 11/1/2019     Precautions  Precautions: Weight Bearing As Tolerated Right Lower Extremity, Weight Bearing As Tolerated Left Lower Extremity  Comments: R knee brace/ R knee instability    Subjective    Pt up in wc, willing to participate. Would like to do Nustep cross  again this pm     Objective       11/01/19 1401   Sitting Lower Body Exercises   Nustep Resistance Level 1  (15 minutes for BLE ROM/ endurance training)   PT Total Time Spent   PT Individual Total Time Spent (Mins) 30   PT Charge Group   PT Therapeutic Exercise 1   PT Therapeutic Activities 1       Reviewed home safety/ DME needs/ and joint protection education with hand outs provided.  Discussed on-going therapy to maintain strength/ endurance and ROM, as well as joint protection and energy conservation. Hand-outs provided    Assessment    Pt remains with limited endurance and pain, but able to function at modified independent level with DME for household activity    Plan    D/c tomorrow with home health PT

## 2019-11-01 NOTE — CARE PLAN
Problem: Safety  Goal: Will remain free from injury  Outcome: PROGRESSING AS EXPECTED  Note:   Pt uses call light consistently and appropriately. Waits for assistance does not attempt self transfer this shift. Able to verbalize needs.      Problem: Bowel/Gastric:  Goal: Normal bowel function is maintained or improved  Outcome: PROGRESSING AS EXPECTED  Note:   Patient having regular bowel movements; last BM 10/30.  Denies s/s constipation; bowel meds available if needed.  Will continue to monitor.      Problem: Pain Management  Goal: Pain level will decrease to patient's comfort goal  Outcome: PROGRESSING AS EXPECTED  Note:   Patient able to verbalize pain level and verbalize an acceptable level of pain.

## 2019-11-01 NOTE — THERAPY
"Occupational Therapy  Daily Treatment     Patient Name: Richard Hubbard II  Age:  44 y.o., Sex:  male  Medical Record #: [id number]  Today's Date: 2019     Precautions  Precautions: Weight Bearing As Tolerated Right Lower Extremity, Weight Bearing As Tolerated Left Lower Extremity  Comments: R knee brace/ R knee instability    Safety   ADL Safety : Modified Independent  Bathroom Safety: Modified Independent  Comments: see FIM for dressing and grooming. Able to perform at a largely SBA to mod I level. However, cont to require assistance for shoes due to swelling of feet, impaired motor control, and pain with bending.     Subjective    \"No thanks, I'd rather not do the kitchen\"     Objective       19 1331   Precautions   Precautions Weight Bearing As Tolerated Right Lower Extremity;Weight Bearing As Tolerated Left Lower Extremity   Comments R knee brace/ R knee instability   Vitals   O2 Delivery None (Room Air)   Pain   Intervention Declines   Pain 0 - 10 Group   Therapist Pain Assessment 0   Non Verbal Descriptors   Non Verbal Scale  Calm   Cognition    Level of Consciousness Alert   Interdisciplinary Plan of Care Collaboration   Patient Position at End of Therapy Seated;Call Light within Reach;Tray Table within Reach;Phone within Reach   OT Total Time Spent   OT Individual Total Time Spent (Mins) 30   OT Charge Group   OT Self Care / ADL 1   OT Therapy Activity 1       FIM Upper Body Dressin - Modified Independent  Upper Body Dressing Description:  Increased time    FIM Toiletin - Modified Independent  Toileting Description:  Grab bar, Increased time    FIM Toilet Transfer Score:  6 - Modified Independent  Toilet Transfer Description:  Grab bar      Assessment    Therapist offered kitchen access/simple meal prep activity - pt declined.  Reviewed pending discharge home for bathroom transfers (commode, shower stall), use of AE/DME for LB dressing - demonstrated - long handled shoe horn reacher, " use of FWW and manual wc for bathroom navigation.  Home health therapy will assess environment/therapy.    Plan    DC home with HH OT and PT services to follow 11-2-19

## 2019-11-01 NOTE — DISCHARGE PLANNING
Case Management Discharge Instructions        Discharge Location: Home with Home Health   Agency Name / Address / Phone: [facility]  [phone]       [address]  [place]  [address]      Home Health: Registered Nurse, Occupational & Physical Therapist, Home Health Aide     Medical Equipment Provider / Phone: [facility]  [phone]        [address] [address]     [facility]: IV antibiotics [phone]     Follow-up Information:   [facility]: MRI    [place] [address]    [phone]   Thursday 11.14.2019, check in at 12:45pm for 1:00pm appointment      Gerardo Gutierrez M.D. Physiatry    [address]    [facility] [address] [phone]    Thursday 11.14.2019, check in at 4:30pm for 5:00pm appointment     Jessica Goldman M.D. Infectious Disease / Community Care Services    [address]    [facility] [address]    [phone]   Friday 11.15.2019, check in at 12:45pm for 1:00pm appointment ciera/ MARTÍN Quevedo Primary Care Provider    [address]    [facility] [address] [phone]    Wednesday 11.20.2019, check in at 11:00am for 11:20am appointment      You Olivares M.D. Orthopaedics    [address] [address]    [phone]    Tuesday 11.26.2019, appointment at 3:00pm      [facility]    [phone]     requesting cardiac clearance prior to scheduling EGD & colonoscopy

## 2019-11-01 NOTE — DISCHARGE PLANNING
Met w/ patient to review ongoing progress toward DC planning. MD f/u appts updated as much as possible for [facility], not able to reschedule ortho f/u. [facility] will be onsite Friday at 9am for training. Reviewed [facility] for possible FWW w/ bilat platform attachments. Contacted [facility] to check availability of FWW & bilat platform attachments. [facility] requests I call back tomorrow. Questions answered. Emotional support provided.

## 2019-11-01 NOTE — CARE PLAN
Problem: OT Long Term Goals  Goal: LTG-By discharge, patient will complete basic self care tasks  Description  1) Individualized Goal:  With mod I using AE/AD/techniques as needed.  2) Interventions:  OT Group Therapy, OT Self Care/ADL, OT Community Reintegration, OT Manual Ther Technique, OT Neuro Re-Ed/Balance, OT Sensory Int Techniques, OT Therapeutic Activity, OT Evaluation and OT Therapeutic Exercise     Outcome: MET  Goal: LTG-By discharge, patient will perform bathroom transfers  Description  1) Individualized Goal:  Mod I using AE/AD/techniques as needed  2) Interventions:  OT Group Therapy, OT Self Care/ADL, OT Community Reintegration, OT Manual Ther Technique, OT Neuro Re-Ed/Balance, OT Sensory Int Techniques, OT Therapeutic Activity, OT Evaluation and OT Therapeutic Exercise     Outcome: MET  Goal: LTG-By discharge, patient will complete basic home management  Description  1) Individualized Goal:  (meal prep, laundry, kitchen cleanup) mod I using AE/AD/techniques as needed.  2) Interventions:  OT Group Therapy, OT Self Care/ADL, OT Community Reintegration, OT Manual Ther Technique, OT Neuro Re-Ed/Balance, OT Sensory Int Techniques, OT Therapeutic Activity, OT Evaluation and OT Therapeutic Exercise     Outcome: MET

## 2019-11-01 NOTE — THERAPY
"Physical Therapy   Daily Treatment     Patient Name: Richard Hubbard II  Age:  44 y.o., Sex:  male  Medical Record #: [id number]  Today's Date: 11/1/2019     Precautions  Precautions: Weight Bearing As Tolerated Right Lower Extremity, Weight Bearing As Tolerated Left Lower Extremity  Comments: R knee brace/ R knee instability    Subjective    Pt sitting edge of bed, getting ready for PT     Objective       11/01/19 1031   Sitting Lower Body Exercises   Nustep Resistance Level 1  (15 minutes with BLE's only for general cardio-training)   Bed Mobility    Supine to Sit Modified Independent   Sit to Supine Modified Independent   Sit to Stand Modified Independent   Scooting Modified Independent   Rolling Modified Independent   PT Total Time Spent   PT Individual Total Time Spent (Mins) 60   PT Charge Group   PT Gait Training 2   PT Therapeutic Exercise 1   PT Therapeutic Activities 1       FIM Bed/Chair/Wheelchair Transfers Score: 6 - Modified Independent  Bed/Chair/Wheelchair Transfers Description:  Adaptive equipment, Increased time    FIM Walking Score:  2 - Max Assistance  Walking Description:  (CGA with platform walker (Arjo walker) x 60 ft)    FIM Wheelchair Score:  6 - Modified Independent  Wheelchair Description:  Extra time    FIM Stairs Score:  2 - Max Assistance  Stairs Description:  (min A for two 6\" consecutive steps with FWW + two 6\" platform steps with FWW. )      Assessment    Pt completed gait/ stair review and exercise as noted, remains limited by R knee instability and multi-joint RA    Plan    Review home safety, DME use and joint protection education. D/c tomorrow with follow up home health PT    "

## 2019-11-01 NOTE — PROGRESS NOTES
"Rehab Progress Note     Encounter Date: 11/1/2019    CC: left pelvic fracture, pain, RA    Interval Events (Subjective)  Patient sitting up in his room. He reports wrist splint is helping somewhat with his pain. Discussed opiate counseling as well as Lyrica. He reports he will have follow-up with Dr. Gutierrez. Discussed that most likely would work on weaning patient off of opiates. He reports he understands risk of dependence, tolerance and overdose.  Discussed would provide 2 week prescription. He hopes he will be cleared for his knee surgery after antibiotics finish. Denies NVD. Denies SOB.     IDT Team Meeting 10/29/2019  DC/Disposition:  11/2/19    Objective:  VITAL SIGNS: /63   Pulse 72   Temp 36.8 °C (98.3 °F) (Temporal)   Resp 18   Ht 1.803 m (5' 11\") Comment: per patient  Wt 99.1 kg (218 lb 7.6 oz)   SpO2 100%   BMI 30.47 kg/m²   Gen: NAD  Psych: Mood and affect appropriate  CV: RRR, no edema  Resp: CTAB, no upper airway sounds  Abd: NTND  Neuro: AOx4, limited finger extension on right, 3/5 wrist extension  Unchanged from 10/31/19    Recent Results (from the past 72 hour(s))   CBC WITH DIFFERENTIAL    Collection Time: 11/01/19 12:00 PM   Result Value Ref Range    WBC 7.2 4.8 - 10.8 K/uL    RBC 3.40 (L) 4.70 - 6.10 M/uL    Hemoglobin 9.9 (L) 14.0 - 18.0 g/dL    Hematocrit 33.0 (L) 42.0 - 52.0 %    MCV 97.1 81.4 - 97.8 fL    MCH 29.1 27.0 - 33.0 pg    MCHC 30.0 (L) 33.7 - 35.3 g/dL    RDW 73.9 (H) 35.9 - 50.0 fL    Platelet Count 197 164 - 446 K/uL    MPV 9.7 9.0 - 12.9 fL    Neutrophils-Polys 46.80 44.00 - 72.00 %    Lymphocytes 38.10 22.00 - 41.00 %    Monocytes 10.50 0.00 - 13.40 %    Eosinophils 1.40 0.00 - 6.90 %    Basophils 1.00 0.00 - 1.80 %    Immature Granulocytes 2.20 (H) 0.00 - 0.90 %    Nucleated RBC 0.00 /100 WBC    Neutrophils (Absolute) 3.38 1.82 - 7.42 K/uL    Lymphs (Absolute) 2.75 1.00 - 4.80 K/uL    Monos (Absolute) 0.76 0.00 - 0.85 K/uL    Eos (Absolute) 0.10 0.00 - 0.51 K/uL "    Baso (Absolute) 0.07 0.00 - 0.12 K/uL    Immature Granulocytes (abs) 0.16 (H) 0.00 - 0.11 K/uL    NRBC (Absolute) 0.00 K/uL   PERIPHERAL SMEAR REVIEW    Collection Time: 11/01/19 12:00 PM   Result Value Ref Range    Peripheral Smear Review see below    DIFFERENTIAL COMMENT    Collection Time: 11/01/19 12:00 PM   Result Value Ref Range    Comments-Diff see below        Current Facility-Administered Medications   Medication Frequency   • predniSONE (DELTASONE) tablet 10 mg DAILY   • senna-docusate (PERICOLACE or SENOKOT S) 8.6-50 MG per tablet 2 Tab BID PRN    And   • polyethylene glycol/lytes (MIRALAX) PACKET 1 Packet QDAY PRN    And   • magnesium hydroxide (MILK OF MAGNESIA) suspension 30 mL QDAY PRN    And   • bisacodyl (DULCOLAX) suppository 10 mg QDAY PRN   • pregabalin (LYRICA) capsule 150 mg TID   • nicotine (NICODERM) 7 MG/24HR 7 mg Daily-0600    And   • nicotine polacrilex (NICORETTE) 2 MG piece 2 mg Q HOUR PRN   • HYDROcodone-acetaminophen (NORCO) 5-325 MG per tablet 1 Tab Q3HRS PRN   • HYDROcodone/acetaminophen (NORCO)  MG per tablet 1 Tab Q3HRS PRN   • acetaminophen (TYLENOL) tablet 500 mg TID   • vitamin D (cholecalciferol) tablet 2,000 Units DAILY   • normal saline PF 20 mL Q12HRS   • Respiratory Care per Protocol Continuous RT   • tramadol (ULTRAM) 50 MG tablet 50 mg Q4HRS PRN   • hydrALAZINE (APRESOLINE) tablet 25 mg Q8HRS PRN   • acetaminophen (TYLENOL) tablet 650 mg Q4HRS PRN   • artificial tears ophthalmic solution 1 Drop PRN   • benzocaine-menthol (CEPACOL) lozenge 1 Lozenge Q2HRS PRN   • mag hydrox-al hydrox-simeth (MAALOX PLUS ES or MYLANTA DS) suspension 20 mL Q2HRS PRN   • ondansetron (ZOFRAN ODT) dispertab 4 mg 4X/DAY PRN    Or   • ondansetron (ZOFRAN) syringe/vial injection 4 mg 4X/DAY PRN   • traZODone (DESYREL) tablet 50 mg QHS PRN   • sodium chloride (OCEAN) 0.65 % nasal spray 2 Spray PRN   • calcium citrate (CALCITRATE) tablet 950 mg DAILY   • ceFAZolin (ANCEF) 2 g in  mL  IVPB Q8HRS   • ferrous sulfate tablet 325 mg DAILY   • furosemide (LASIX) tablet 20 mg BID DIURETIC   • losartan (COZAAR) tablet 25 mg DAILY   • morphine ER (MS CONTIN) tablet 15 mg TID   • omeprazole (PRILOSEC) capsule 40 mg BID   • simethicone (MYLICON) chewable tab 80 mg TID PRN       Orders Placed This Encounter   Procedures   • Diet Order Regular     Standing Status:   Standing     Number of Occurrences:   1     Order Specific Question:   Diet:     Answer:   Regular [1]       Assessment:  Active Hospital Problems    Diagnosis   • *Pubic ramus fracture (HCC)   • Rheumatoid arthritis (HCC)   • Iron deficiency anemia, unspecified iron deficiency anemia type   • MONICA (obstructive sleep apnea)   • Depression       Medical Decision Making and Plan:  Left pelvic fracture - managed conservatively, found to have abscess in thigh status post drainage.   -PT and OT for mobility and ADLs  -Follow-up with Orthopedics     Pain - Patient with left hip and right knee. Was supposed to get right knee replacement prior to falls and hospitalization.  -On Morphine 15 mg BID, Lyrica 25 mg TID, and scheduled Tylenol. With Norco PRN 5 to 10 mg - trial instead of Oxycodone. Discussed risks and benefits.   -Increase Lyrica to 150 mg TID, no change in symptoms. Continue to monitor, he reports confusion on Gabapentin  I discussed the risks and benefits of using opiate medications for pain control.  I discussed the risk of addiction, potential for overdose, and respiratory depression (and the potential need for opiate antagonist therapy if this occurs).  I encouraged the patient to take this medication sparingly with the expressed goal of weaning off the medication as soon as is clinically appropriate.  I informed the patient that we are only able to provide a 14 day supply of these medications at discharge and that they will be responsible for requesting any refills needed from their primary care provider or their surgeon.  We discussed  the need to safely secure these medications to prevent theft, inadvertent ingestion, or misuse.  Any unused medication should be immediately disposed of through a sanctioned medication disposal program.  We discussed adjunctive pain medications and conservative therapies at length.    I answered the patient's questions regarding this treatment, and the patient indicated understanding and willingness to proceed.     Left thigh mass/osteomyelitis - s/p drainage with MSSA growing from cultures.    -Continue Ancef 2 g q8h until 11/22/19.  Recommending weekly CBC and CMP  -WBC 11.5 on 10/25/19, most likely steroids. Continue to monitor, afebrile. WBC 7.2 on 11/1/19     Fungal rash - Patient with diffuse fungal rash. Patient s/p fluconazole for 10 days. Per pharmacy no miconazole cream currently. Will switch to clotrimazole cream BID  -Discontinue cream as resolved.      HTN/AS - Patient on Losartan 25 mg daily, Lasix 20 mg BID      Anemia - Patient on Fe supplement on transfer. Check AM CBC - 8.3, stable. Check Iron panel in AM  -9.3, iron panel normal. 9.9 on repeat on 11/1/19     RA - Patient previously on DMARD, now on hold while has active osteomyelitis. On Predinsone 40 mg daily  -Titrate down to 10 mg for 7 days at discharge     Nicotine dependence - On patch on transfer. Will need teaching.   -Reduce Nicotine patch. Counseling performed on 10/24/19      GERD - History of PUD. Continue Omeprazole.     Vitamin D deficiency - 1000 U on transfer. Check level - 20.  Increase to 2000 U daily     DVT ppx - Patient on Lovenox on transfer. Walking > 100 feet, discontinue Lovenox    Dispo - Patient with worsening right wrist pain due to overuse. Would benefit from additional day of therapy to work on technique. Plan for discharge on Saturday.     Total time:  38 minutes.  I spent greater than 50% of the time for patient care, counseling, and coordination on this date, including unit/floor time, and face-to-face time with  the patient as per interval events and assessment and plan above. Topics discussed included discharge planning, discharge medications and opiate counseling.     Keaton Pisano M.D.

## 2019-11-01 NOTE — CARE PLAN
Problem: Infection  Goal: Will remain free from infection  Outcome: PROGRESSING AS EXPECTED  Note:   Pt continues on IV Ancef for osteomyelitis/left thigh mass.     Problem: Pain Management  Goal: Pain level will decrease to patient's comfort goal  Outcome: PROGRESSING AS EXPECTED  Note:   Pt complains of pain to L pelvis and back. Pt is receiving scheduled morphine tablet, lyrica and PRN norco. Refused scheduled tylenol this morning.

## 2019-11-02 VITALS
BODY MASS INDEX: 30.59 KG/M2 | SYSTOLIC BLOOD PRESSURE: 106 MMHG | HEART RATE: 78 BPM | OXYGEN SATURATION: 96 % | HEIGHT: 71 IN | RESPIRATION RATE: 18 BRPM | DIASTOLIC BLOOD PRESSURE: 68 MMHG | TEMPERATURE: 98.2 F | WEIGHT: 218.48 LBS

## 2019-11-02 PROCEDURE — 700105 HCHG RX REV CODE 258: Performed by: PHYSICAL MEDICINE & REHABILITATION

## 2019-11-02 PROCEDURE — A9270 NON-COVERED ITEM OR SERVICE: HCPCS | Performed by: PHYSICAL MEDICINE & REHABILITATION

## 2019-11-02 PROCEDURE — 700111 HCHG RX REV CODE 636 W/ 250 OVERRIDE (IP)

## 2019-11-02 PROCEDURE — 700111 HCHG RX REV CODE 636 W/ 250 OVERRIDE (IP): Performed by: PHYSICAL MEDICINE & REHABILITATION

## 2019-11-02 PROCEDURE — 700101 HCHG RX REV CODE 250: Performed by: PHYSICAL MEDICINE & REHABILITATION

## 2019-11-02 PROCEDURE — 700102 HCHG RX REV CODE 250 W/ 637 OVERRIDE(OP): Performed by: PHYSICAL MEDICINE & REHABILITATION

## 2019-11-02 RX ADMIN — PREDNISONE 10 MG: 5 TABLET ORAL at 08:51

## 2019-11-02 RX ADMIN — FUROSEMIDE 20 MG: 20 TABLET ORAL at 05:14

## 2019-11-02 RX ADMIN — PREGABALIN 150 MG: 150 CAPSULE ORAL at 08:52

## 2019-11-02 RX ADMIN — HEPARIN 500 UNITS: 100 SYRINGE at 16:10

## 2019-11-02 RX ADMIN — FUROSEMIDE 20 MG: 20 TABLET ORAL at 16:01

## 2019-11-02 RX ADMIN — HYDROCODONE BITARTRATE AND ACETAMINOPHEN 1 TABLET: 10; 325 TABLET ORAL at 15:56

## 2019-11-02 RX ADMIN — LOSARTAN POTASSIUM 25 MG: 25 TABLET ORAL at 08:51

## 2019-11-02 RX ADMIN — MORPHINE SULFATE 15 MG: 15 TABLET, FILM COATED, EXTENDED RELEASE ORAL at 08:51

## 2019-11-02 RX ADMIN — HYDROCODONE BITARTRATE AND ACETAMINOPHEN 1 TABLET: 10; 325 TABLET ORAL at 08:51

## 2019-11-02 RX ADMIN — NICOTINE 7 MG: 7 PATCH, EXTENDED RELEASE TRANSDERMAL at 05:14

## 2019-11-02 RX ADMIN — PREGABALIN 150 MG: 150 CAPSULE ORAL at 14:08

## 2019-11-02 RX ADMIN — CEFAZOLIN 2 G: 10 INJECTION, POWDER, FOR SOLUTION INTRAVENOUS at 14:04

## 2019-11-02 RX ADMIN — FERROUS SULFATE TAB 325 MG (65 MG ELEMENTAL FE) 325 MG: 325 (65 FE) TAB at 08:51

## 2019-11-02 RX ADMIN — CEFAZOLIN 2 G: 10 INJECTION, POWDER, FOR SOLUTION INTRAVENOUS at 05:20

## 2019-11-02 RX ADMIN — HYDROCODONE BITARTRATE AND ACETAMINOPHEN 1 TABLET: 10; 325 TABLET ORAL at 11:58

## 2019-11-02 RX ADMIN — Medication 20 ML: at 08:51

## 2019-11-02 RX ADMIN — CALCIUM CITRATE 200 MG (950 MG) TABLET 950 MG: at 08:51

## 2019-11-02 RX ADMIN — VITAMIN D, TAB 1000IU (100/BT) 2000 UNITS: 25 TAB at 08:52

## 2019-11-02 RX ADMIN — MORPHINE SULFATE 15 MG: 15 TABLET, FILM COATED, EXTENDED RELEASE ORAL at 14:08

## 2019-11-02 RX ADMIN — HYDROCODONE BITARTRATE AND ACETAMINOPHEN 1 TABLET: 10; 325 TABLET ORAL at 05:14

## 2019-11-02 RX ADMIN — OMEPRAZOLE 40 MG: 20 CAPSULE, DELAYED RELEASE ORAL at 08:51

## 2019-11-02 NOTE — CARE PLAN
Problem: PT-Long Term Goals  Goal: LTG-By discharge, patient will propel wheelchair  Description  1) Individualized goal:  Modified independent for household mobility 50 ft x 2 to/from therapies  2) Interventions:  PT Group Therapy, PT E Stim Attended, PT Gait Training, PT Self Care/Home Eval, PT Therapeutic Exercises, PT TENS Application, PT Neuro Re-Ed/Balance, PT Aquatic Therapy, PT Therapeutic Activity, PT Manual Therapy and PT Evaluation     Outcome: MET  Goal: LTG-By discharge, patient will transfer one surface to another  Description  1) Individualized goal:  Modified independent with FWW  2) Interventions:  PT Group Therapy, PT E Stim Attended, PT Gait Training, PT Self Care/Home Eval, PT Therapeutic Exercises, PT TENS Application, PT Neuro Re-Ed/Balance, PT Aquatic Therapy, PT Therapeutic Activity, PT Manual Therapy and PT Evaluation     Outcome: MET     Problem: PT-Long Term Goals  Goal: LTG-By discharge, patient will ambulate  Description  1) Individualized goal:  Modified independent with FWW for household mobility 50 ft x 3  2) Interventions:  PT Group Therapy, PT E Stim Attended, PT Gait Training, PT Self Care/Home Eval, PT Therapeutic Exercises, PT TENS Application, PT Neuro Re-Ed/Balance, PT Aquatic Therapy, PT Therapeutic Activity, PT Manual Therapy and PT Evaluation     Outcome: DISCHARGED-GOAL NOT MET  Goal: LTG-By discharge, patient will ambulate up/down 4-6 stairs  Description  1) Individualized goal:  SBA with hand rail vs SPC  2) Interventions:  PT Group Therapy, PT E Stim Attended, PT Gait Training, PT Self Care/Home Eval, PT Therapeutic Exercises, PT TENS Application, PT Neuro Re-Ed/Balance, PT Aquatic Therapy, PT Therapeutic Activity, PT Manual Therapy and PT Evaluation     Outcome: DISCHARGED-GOAL NOT MET  Goal: LTG-By discharge, patient will transfer in/out of a car  Description  1) Individualized goal:  Modified independent with FWW  2) Interventions:  PT Group Therapy, PT E Stim  Attended, PT Gait Training, PT Self Care/Home Eval, PT Therapeutic Exercises, PT TENS Application, PT Neuro Re-Ed/Balance, PT Aquatic Therapy, PT Therapeutic Activity, PT Manual Therapy and PT Evaluation     Outcome: DISCHARGED-GOAL NOT MET

## 2019-11-02 NOTE — PROGRESS NOTES
Patient discharged to home per order.  Discharge instructions reviewed with patient and spouse; they verbalize understanding and signed copies placed in chart.  Patient has all belongings; signed copy of form in chart.  Patient left facility at 1620 via wheelchair in stable condition

## 2019-11-02 NOTE — CARE PLAN
Problem: Safety  Goal: Will remain free from injury  Outcome: PROGRESSING AS EXPECTED  Note:   Call light with in reach. Redirection to use call light for assistance. Non skid socks. Upper siderails up x2 while in bed.      Problem: Infection  Goal: Will remain free from infection  Outcome: PROGRESSING AS EXPECTED  Note:   Continues IV ABT for Osteomyelitis, no adverse reaction. Afebrile, encouraged increase fluids intake. Will continue to monitor.     Problem: Discharge Barriers/Planning  Goal: Patient's continuum of care needs will be met  Outcome: PROGRESSING AS EXPECTED  Note:   Patient on ABT therapy for Osteomyelitis. Hx of pelvic fracture and pelvic abscess. BLE WBAT. Continent of bladder and bowel. Regular thin liquids diet. Able to make needs known. Assisted as needed. No respiratory distress. For discharge home 11/2/19. Will monitor.     Problem: Pain Management  Goal: Pain level will decrease to patient's comfort goal  Outcome: PROGRESSING AS EXPECTED  Note:   Educate patient of non-pharmacological comfort measures: repositioning, relaxation/breathing technique, cold compress and activities. Complains of pelvic pain, as needed medication given with relief.

## 2019-11-02 NOTE — DISCHARGE PLANNING
Case management Summary:   Met with patient prior to discharge.   Reviewed all follow up appointments: MRI f/u, Physiatry, PCP, Inf Disease, Ortho.   Referral made to [facility] and they are have accepted referral and are ready to follow.    [facility] has delivered 20in wheelchair & shower chair to patient. Verified FWW w/ bilat platform attachments will be delivered Saturday morning prior to discharge.  [facility] has provided set up training today w/ patient & will be providing IV abx OP.  Faxed [employer] handicap placard request.  Wife will be providing transportation home.     During hospitalization, I have provided support and education and have been available for questions and information during hours of operation, communicated with therapy team and MD along with providing links/resources  to outside services.    Patient verbalizes agreement with all plans and has an understanding of the next steps within the post acute services.     Individualized Goals:   1. To get better  2. To be able to walk independently  3. Arrange post acute services: IV abx, DME, therapy    Outcome:   1. Goal partially met: patient has made progress w/ therapy & medical regime since admit. It is anticipated he will continue to improve w/ OP mgmt: HH & IV abx in place.  2. Goal not met: patient mod independent wheelchair level & FWW w/ bilat platform attachments. HH referral in place: RN, PT/OT. It is anticipated he will continue to improve w/ OP mgmt.  3. Goal met & completed.

## 2019-11-02 NOTE — DISCHARGE INSTRUCTIONS
[facility] NURSING DISCHARGE INSTRUCTIONS    Blood Pressure: 124/85  Weight: 99.1 kg (218 lb 7.6 oz)  Nursing recommendations for Richard Salinas Stevie AMATO at time of discharge are as follows:  Client verbalized understanding of all discharge instructions and prescriptions.     Review all your home medications and newly ordered medications with your doctor and/or pharmacist. Follow medication instructions as directed by your doctor and/or pharmacist.    Pain Management:   Discharge Pain Medication Instructions:  Comfort Goal: Comfort with Movement, Comfort at Rest, Sleep Comfortably  Notify your primary care provider if pain is unrelieved with these measures, if the pain is new, or increased in intensity.    Discharge Skin Characteristics:    Discharge Skin Exam:       Skin / Wound Care Instructions: Please contact your primary care physician for any change in skin integrity. Abdomen.    If You Have Surgical Incisions / Wounds:  Monitor surgical site(s) for signs of increased swelling, redness or symptoms of drainage from the site or fever as this could indicate signs and symptoms of infection. If these symptoms are noted, notifiy your primary care provider.      Discharge Safety Instructions: Should Not Be Left Alone In The House     Discharge Safety Concerns: Weakness  The interdisciplinary team has made recommendation that you should not be left alone  in the house due to weakness  Anti-embolic stockings are required during the day and off at night to increase circulation to the lower extremities.    Discharge Diet: Regular     Discharge Liquids: Thin  Discharge Bowel Function: Continent  Please contact your primary care physician for any changes in bowel habits.  Discharge Bowel Program:    Discharge Bladder Function: Continent  Discharge Urinary Devices:        Nursing Discharge Plan:   Smoking Cessation Offered: Patient Counseled  Influenza Vaccine Indication: Indicated: 9 to 64 years of  age  Influenza Vaccine Given - only chart on this line when given: Influenza Vaccine Given (See MAR)    Case Management Discharge Instructions:   Discharge Location: Home with Home Health  Agency Name/Address/Phone: [facility]  [address]  [place]  [address] [phone]  Home Health: Registered Nurse, Occupational Therapist, Physical Therapist, Home Health Aide  Outpatient Services:    DME Provider/Phone: [facility]  [address] [place] [address] [phone]  Medical Equipment Ordered: Wheelchair  Prescription Faxed to:        Discharge Medication Instructions:  Below are the medications your physician expects you to take upon discharge:    Fall Prevention in the Home  Falls can cause injuries and can affect people from all age groups. There are many simple things that you can do to make your home safe and to help prevent falls.  What can I do on the outside of my home?  · Regularly repair the edges of walkways and driveways and fix any cracks.  · Remove high doorway thresholds.  · Trim any shrubbery on the main path into your home.  · Use bright outdoor lighting.  · Clear walkways of debris and clutter, including tools and rocks.  · Regularly check that handrails are securely fastened and in good repair. Both sides of any steps should have handrails.  · Install guardrails along the edges of any raised decks or porches.  · Have leaves, snow, and ice cleared regularly.  · Use sand or salt on walkways during winter months.  · In the garage, clean up any spills right away, including grease or oil spills.  What can I do in the bathroom?  · Use night lights.  · Install grab bars by the toilet and in the tub and shower. Do not use towel bars as grab bars.  · Use non-skid mats or decals on the floor of the tub or shower.  · If you need to sit down while you are in the shower, use a plastic, non-slip stool.  · Keep the floor dry. Immediately clean up any water that spills on the  floor.  · Remove soap buildup in the tub or shower on a regular basis.  · Attach bath mats securely with double-sided non-slip rug tape.  · Remove throw rugs and other tripping hazards from the floor.  What can I do in the bedroom?  · Use night lights.  · Make sure that a bedside light is easy to reach.  · Do not use oversized bedding that drapes onto the floor.  · Have a firm chair that has side arms to use for getting dressed.  · Remove throw rugs and other tripping hazards from the floor.  What can I do in the kitchen?  · Clean up any spills right away.  · Avoid walking on wet floors.  · Place frequently used items in easy-to-reach places.  · If you need to reach for something above you, use a sturdy step stool that has a grab bar.  · Keep electrical cables out of the way.  · Do not use floor polish or wax that makes floors slippery. If you have to use wax, make sure that it is non-skid floor wax.  · Remove throw rugs and other tripping hazards from the floor.  What can I do in the stairways?  · Do not leave any items on the stairs.  · Make sure that there are handrails on both sides of the stairs. Fix handrails that are broken or loose. Make sure that handrails are as long as the stairways.  · Check any carpeting to make sure that it is firmly attached to the stairs. Fix any carpet that is loose or worn.  · Avoid having throw rugs at the top or bottom of stairways, or secure the rugs with carpet tape to prevent them from moving.  · Make sure that you have a light switch at the top of the stairs and the bottom of the stairs. If you do not have them, have them installed.  What are some other fall prevention tips?  · Wear closed-toe shoes that fit well and support your feet. Wear shoes that have rubber soles or low heels.  · When you use a stepladder, make sure that it is completely opened and that the sides are firmly locked. Have someone hold the ladder while you are using it. Do not climb a closed  stepladder.  · Add color or contrast paint or tape to grab bars and handrails in your home. Place contrasting color strips on the first and last steps.  · Use mobility aids as needed, such as canes, walkers, scooters, and crutches.  · Turn on lights if it is dark. Replace any light bulbs that burn out.  · Set up furniture so that there are clear paths. Keep the furniture in the same spot.  · Fix any uneven floor surfaces.  · Choose a carpet design that does not hide the edge of steps of a stairway.  · Be aware of any and all pets.  · Review your medicines with your healthcare provider. Some medicines can cause dizziness or changes in blood pressure, which increase your risk of falling.  Talk with your health care provider about other ways that you can decrease your risk of falls. This may include working with a physical therapist or  to improve your strength, balance, and endurance.  This information is not intended to replace advice given to you by your health care provider. Make sure you discuss any questions you have with your health care provider.  Document Released: 12/08/2003 Document Revised: 05/16/2017 Document Reviewed: 01/22/2016  [employer] Interactive Patient Education © 2017 [employer]        Depression / Suicide Risk    As you are discharged from this [facility] facility, it is important to learn how to keep safe from harming yourself.    Recognize the warning signs:  · Abrupt changes in personality, positive or negative- including increase in energy   · Giving away possessions  · Change in eating patterns- significant weight changes-  positive or negative  · Change in sleeping patterns- unable to sleep or sleeping all the time   · Unwillingness or inability to communicate  · Depression  · Unusual sadness, discouragement and loneliness  · Talk of wanting to die  · Neglect of personal appearance   · Rebelliousness- reckless behavior  · Withdrawal from people/activities they love  · Confusion-  inability to concentrate     If you or a loved one observes any of these behaviors or has concerns about self-harm, here's what you can do:  · Talk about it- your feelings and reasons for harming yourself  · Remove any means that you might use to hurt yourself (examples: pills, rope, extension cords, firearm)  · Get professional help from the community (Mental Health, Substance Abuse, psychological counseling)  · Do not be alone:Call your Safe Contact- someone whom you trust who will be there for you.  · Call your local CRISIS HOTLINE 060-8009 or 750-890-1285  · Call your local Children's Mobile Crisis Response Team [place] (549) 994-0129 or www.MedAvail  · Call the toll free National Suicide Prevention Hotlines   · National Suicide Prevention Lifeline 173-753-XVGG (8200)  · [employer] Line Network 800-SUICIDE (503-6471)                      Occupational Therapy Discharge Instructions for Richard Hubbard II    11/1/2019    Level of Assist Required for Eating: Able to Complete Eating without Assist  Level of Assist Required for Grooming: Able to Complete Grooming without Assist  Level of Assist Required for Dressing: Able to Complete Dressing without Assist  Equipment for Dressing: Sock Aid, Reacher, Long Handled Shoe Horn, Dressing Stick, Elastic Shoe Laces  Level of Assist Required for Toileting: Able to Complete Toileting without Assist  Level of Assist Required for Toilet Transfer: Able to Complete Toilet Transfer without Assist  Equipment for Toilet Transfer: Raised Toilet Seat without Arms, Commode over Toilet  Level of Assist Required for Bathing: Able to Complete Bathing without Assist  Equipment for Bathing: Tub Transfer Bench, Shower Chair, Grab Bars in Tub / Shower, Hand Held Shower Head, Long Handled Sponge  Level of Assist Required for Shower Transfer: Requires Supervision with Shower Transfer  Have someone with you the first  Time getting in /out of shower    Equipment for Shower  Transfer: Grab Bars in Tub / Shower, Tub Transfer Bench, Shower Chair  Level of Assist Required for Home Mgmt: Requires Supervision with Home Management  Level of Assist Required for Meal Prep: Requires Supervision with Meal Preparation  Driving: Please Contact Physician Prior to Driving  Remember to apply the energy conservation tips from the hand out I gave you.       Best of luck, Keep up the hard work  Come back soon and show us how well you are doing. I enjoyed working with you! Wojciech GIFFORD     Physical Therapy Discharge Instructions for Richard Hubbard II    11/2/2019    Weight Bearing Status - Patient Should: Bear Weight as Tolerated Right Leg, Bear Weight as Tolerated Left Leg  Level of Assist Required for Ambulation: Assist for Balance on Flat Surfaces, Assist for Balance on Curbs, Assist for Balance on Stairs  Distance Patient May Ambulate: as tolerated x 25+ ft  Device Recommended for Ambulation: Platform Walker  Level of Assist Required to Propel Wheelchair: Requires No Assist  Level of Assist Required for Transfers: Requires No Assist  Device Recommended for Transfers: Platform Walker      Make sure you have the platform walker to support your body and protect your hands/ wrists. Sontinue to use the wheelchair as your primary mode of locomotion until your RA treatment is resumed to prevent further joint damage. Good luck with your transition home and BE SAFE!!  [facility]

## 2019-11-05 ENCOUNTER — HOSPITAL ENCOUNTER (INPATIENT)
Facility: MEDICAL CENTER | Age: 44
LOS: 5 days | DRG: 309 | End: 2019-11-10
Attending: EMERGENCY MEDICINE | Admitting: HOSPITALIST
Payer: COMMERCIAL

## 2019-11-05 ENCOUNTER — APPOINTMENT (OUTPATIENT)
Dept: RADIOLOGY | Facility: MEDICAL CENTER | Age: 44
DRG: 309 | End: 2019-11-05
Attending: EMERGENCY MEDICINE
Payer: COMMERCIAL

## 2019-11-05 DIAGNOSIS — I48.91 ATRIAL FIBRILLATION WITH RAPID VENTRICULAR RESPONSE (HCC): ICD-10-CM

## 2019-11-05 DIAGNOSIS — M86.10 OTHER ACUTE OSTEOMYELITIS, UNSPECIFIED SITE (HCC): ICD-10-CM

## 2019-11-05 DIAGNOSIS — I48.91 ATRIAL FIBRILLATION, UNSPECIFIED TYPE (HCC): ICD-10-CM

## 2019-11-05 LAB
ALBUMIN SERPL BCP-MCNC: 3.6 G/DL (ref 3.2–4.9)
ALBUMIN/GLOB SERPL: 1.2 G/DL
ALP SERPL-CCNC: 66 U/L (ref 30–99)
ALT SERPL-CCNC: 14 U/L (ref 2–50)
AMPHET UR QL SCN: NEGATIVE
ANION GAP SERPL CALC-SCNC: 9 MMOL/L (ref 0–11.9)
APPEARANCE UR: CLEAR
AST SERPL-CCNC: 29 U/L (ref 12–45)
BARBITURATES UR QL SCN: NEGATIVE
BASOPHILS # BLD AUTO: 0.8 % (ref 0–1.8)
BASOPHILS # BLD: 0.05 K/UL (ref 0–0.12)
BENZODIAZ UR QL SCN: NEGATIVE
BILIRUB SERPL-MCNC: 0.4 MG/DL (ref 0.1–1.5)
BILIRUB UR QL STRIP.AUTO: NEGATIVE
BUN SERPL-MCNC: 8 MG/DL (ref 8–22)
BZE UR QL SCN: NEGATIVE
CALCIUM SERPL-MCNC: 9.1 MG/DL (ref 8.5–10.5)
CANNABINOIDS UR QL SCN: NEGATIVE
CHLORIDE SERPL-SCNC: 111 MMOL/L (ref 96–112)
CO2 SERPL-SCNC: 24 MMOL/L (ref 20–33)
COLOR UR: YELLOW
COMMENT 1642: NORMAL
CREAT SERPL-MCNC: 0.51 MG/DL (ref 0.5–1.4)
DACRYOCYTES BLD QL SMEAR: NORMAL
EKG IMPRESSION: NORMAL
EKG IMPRESSION: NORMAL
EOSINOPHIL # BLD AUTO: 0.04 K/UL (ref 0–0.51)
EOSINOPHIL NFR BLD: 0.7 % (ref 0–6.9)
ERYTHROCYTE [DISTWIDTH] IN BLOOD BY AUTOMATED COUNT: 68.3 FL (ref 35.9–50)
GLOBULIN SER CALC-MCNC: 2.9 G/DL (ref 1.9–3.5)
GLUCOSE SERPL-MCNC: 98 MG/DL (ref 65–99)
GLUCOSE UR STRIP.AUTO-MCNC: NEGATIVE MG/DL
HCT VFR BLD AUTO: 35.6 % (ref 42–52)
HGB BLD-MCNC: 11.1 G/DL (ref 14–18)
IMM GRANULOCYTES # BLD AUTO: 0.05 K/UL (ref 0–0.11)
IMM GRANULOCYTES NFR BLD AUTO: 0.8 % (ref 0–0.9)
KETONES UR STRIP.AUTO-MCNC: NEGATIVE MG/DL
LEUKOCYTE ESTERASE UR QL STRIP.AUTO: NEGATIVE
LYMPHOCYTES # BLD AUTO: 1.25 K/UL (ref 1–4.8)
LYMPHOCYTES NFR BLD: 20.7 % (ref 22–41)
MCH RBC QN AUTO: 29.4 PG (ref 27–33)
MCHC RBC AUTO-ENTMCNC: 31.2 G/DL (ref 33.7–35.3)
MCV RBC AUTO: 94.2 FL (ref 81.4–97.8)
METHADONE UR QL SCN: NEGATIVE
MICRO URNS: NORMAL
MONOCYTES # BLD AUTO: 0.39 K/UL (ref 0–0.85)
MONOCYTES NFR BLD AUTO: 6.4 % (ref 0–13.4)
MORPHOLOGY BLD-IMP: NORMAL
NEUTROPHILS # BLD AUTO: 4.27 K/UL (ref 1.82–7.42)
NEUTROPHILS NFR BLD: 70.6 % (ref 44–72)
NITRITE UR QL STRIP.AUTO: NEGATIVE
NRBC # BLD AUTO: 0 K/UL
NRBC BLD-RTO: 0 /100 WBC
OPIATES UR QL SCN: POSITIVE
OVALOCYTES BLD QL SMEAR: NORMAL
OXYCODONE UR QL SCN: NEGATIVE
PCP UR QL SCN: NEGATIVE
PH UR STRIP.AUTO: 7 [PH] (ref 5–8)
PLATELET # BLD AUTO: 170 K/UL (ref 164–446)
PLATELET BLD QL SMEAR: NORMAL
PMV BLD AUTO: 8.9 FL (ref 9–12.9)
POIKILOCYTOSIS BLD QL SMEAR: NORMAL
POTASSIUM SERPL-SCNC: 4.2 MMOL/L (ref 3.6–5.5)
PROPOXYPH UR QL SCN: NEGATIVE
PROT SERPL-MCNC: 6.5 G/DL (ref 6–8.2)
PROT UR QL STRIP: NEGATIVE MG/DL
RBC # BLD AUTO: 3.78 M/UL (ref 4.7–6.1)
RBC BLD AUTO: PRESENT
RBC UR QL AUTO: NEGATIVE
SODIUM SERPL-SCNC: 144 MMOL/L (ref 135–145)
SP GR UR STRIP.AUTO: 1
T4 FREE SERPL-MCNC: 0.97 NG/DL (ref 0.53–1.43)
TROPONIN T SERPL-MCNC: 7 NG/L (ref 6–19)
TSH SERPL DL<=0.005 MIU/L-ACNC: 0.93 UIU/ML (ref 0.38–5.33)
UROBILINOGEN UR STRIP.AUTO-MCNC: 0.2 MG/DL
WBC # BLD AUTO: 6.1 K/UL (ref 4.8–10.8)

## 2019-11-05 PROCEDURE — 700102 HCHG RX REV CODE 250 W/ 637 OVERRIDE(OP): Performed by: STUDENT IN AN ORGANIZED HEALTH CARE EDUCATION/TRAINING PROGRAM

## 2019-11-05 PROCEDURE — 85025 COMPLETE CBC W/AUTO DIFF WBC: CPT

## 2019-11-05 PROCEDURE — 80307 DRUG TEST PRSMV CHEM ANLYZR: CPT

## 2019-11-05 PROCEDURE — A9270 NON-COVERED ITEM OR SERVICE: HCPCS | Performed by: STUDENT IN AN ORGANIZED HEALTH CARE EDUCATION/TRAINING PROGRAM

## 2019-11-05 PROCEDURE — 80053 COMPREHEN METABOLIC PANEL: CPT

## 2019-11-05 PROCEDURE — 84439 ASSAY OF FREE THYROXINE: CPT

## 2019-11-05 PROCEDURE — 84484 ASSAY OF TROPONIN QUANT: CPT

## 2019-11-05 PROCEDURE — 71275 CT ANGIOGRAPHY CHEST: CPT

## 2019-11-05 PROCEDURE — 99285 EMERGENCY DEPT VISIT HI MDM: CPT

## 2019-11-05 PROCEDURE — 700111 HCHG RX REV CODE 636 W/ 250 OVERRIDE (IP): Performed by: EMERGENCY MEDICINE

## 2019-11-05 PROCEDURE — 96375 TX/PRO/DX INJ NEW DRUG ADDON: CPT

## 2019-11-05 PROCEDURE — 700111 HCHG RX REV CODE 636 W/ 250 OVERRIDE (IP): Performed by: STUDENT IN AN ORGANIZED HEALTH CARE EDUCATION/TRAINING PROGRAM

## 2019-11-05 PROCEDURE — 770020 HCHG ROOM/CARE - TELE (206)

## 2019-11-05 PROCEDURE — 96365 THER/PROPH/DIAG IV INF INIT: CPT

## 2019-11-05 PROCEDURE — 36415 COLL VENOUS BLD VENIPUNCTURE: CPT

## 2019-11-05 PROCEDURE — 700117 HCHG RX CONTRAST REV CODE 255: Performed by: EMERGENCY MEDICINE

## 2019-11-05 PROCEDURE — 93005 ELECTROCARDIOGRAM TRACING: CPT | Performed by: STUDENT IN AN ORGANIZED HEALTH CARE EDUCATION/TRAINING PROGRAM

## 2019-11-05 PROCEDURE — 84443 ASSAY THYROID STIM HORMONE: CPT

## 2019-11-05 PROCEDURE — 99223 1ST HOSP IP/OBS HIGH 75: CPT | Mod: GC | Performed by: HOSPITALIST

## 2019-11-05 PROCEDURE — 71045 X-RAY EXAM CHEST 1 VIEW: CPT

## 2019-11-05 PROCEDURE — 81003 URINALYSIS AUTO W/O SCOPE: CPT

## 2019-11-05 PROCEDURE — 93005 ELECTROCARDIOGRAM TRACING: CPT | Performed by: EMERGENCY MEDICINE

## 2019-11-05 RX ORDER — LOSARTAN POTASSIUM 25 MG/1
25 TABLET ORAL DAILY
Status: DISCONTINUED | OUTPATIENT
Start: 2019-11-06 | End: 2019-11-07

## 2019-11-05 RX ORDER — PREDNISONE 10 MG/1
10 TABLET ORAL DAILY
Status: DISCONTINUED | OUTPATIENT
Start: 2019-11-06 | End: 2019-11-10 | Stop reason: HOSPADM

## 2019-11-05 RX ORDER — DILTIAZEM HYDROCHLORIDE 5 MG/ML
20 INJECTION INTRAVENOUS ONCE
Status: COMPLETED | OUTPATIENT
Start: 2019-11-05 | End: 2019-11-05

## 2019-11-05 RX ORDER — PREGABALIN 150 MG/1
150 CAPSULE ORAL 3 TIMES DAILY
Status: DISCONTINUED | OUTPATIENT
Start: 2019-11-06 | End: 2019-11-10 | Stop reason: HOSPADM

## 2019-11-05 RX ORDER — ACETAMINOPHEN 500 MG
1000 TABLET ORAL 3 TIMES DAILY
Status: DISCONTINUED | OUTPATIENT
Start: 2019-11-06 | End: 2019-11-10 | Stop reason: HOSPADM

## 2019-11-05 RX ORDER — MORPHINE SULFATE 15 MG/1
15 TABLET, FILM COATED, EXTENDED RELEASE ORAL 3 TIMES DAILY
Status: DISCONTINUED | OUTPATIENT
Start: 2019-11-05 | End: 2019-11-06

## 2019-11-05 RX ORDER — POLYETHYLENE GLYCOL 3350 17 G/17G
1 POWDER, FOR SOLUTION ORAL
Status: DISCONTINUED | OUTPATIENT
Start: 2019-11-05 | End: 2019-11-10 | Stop reason: HOSPADM

## 2019-11-05 RX ORDER — NICOTINE 21 MG/24HR
14 PATCH, TRANSDERMAL 24 HOURS TRANSDERMAL
Status: DISCONTINUED | OUTPATIENT
Start: 2019-11-06 | End: 2019-11-05

## 2019-11-05 RX ORDER — DILTIAZEM HYDROCHLORIDE 5 MG/ML
25 INJECTION INTRAVENOUS ONCE
Status: COMPLETED | OUTPATIENT
Start: 2019-11-05 | End: 2019-11-05

## 2019-11-05 RX ORDER — BISACODYL 10 MG
10 SUPPOSITORY, RECTAL RECTAL
Status: DISCONTINUED | OUTPATIENT
Start: 2019-11-05 | End: 2019-11-10 | Stop reason: HOSPADM

## 2019-11-05 RX ORDER — HYDROCODONE BITARTRATE AND ACETAMINOPHEN 10; 325 MG/1; MG/1
1 TABLET ORAL EVERY 6 HOURS PRN
Status: DISCONTINUED | OUTPATIENT
Start: 2019-11-05 | End: 2019-11-05

## 2019-11-05 RX ORDER — DILTIAZEM HYDROCHLORIDE 5 MG/ML
20 INJECTION INTRAVENOUS ONCE
Status: DISCONTINUED | OUTPATIENT
Start: 2019-11-05 | End: 2019-11-05

## 2019-11-05 RX ORDER — FUROSEMIDE 20 MG/1
20 TABLET ORAL 2 TIMES DAILY
Status: DISCONTINUED | OUTPATIENT
Start: 2019-11-05 | End: 2019-11-10 | Stop reason: HOSPADM

## 2019-11-05 RX ORDER — PREDNISONE 10 MG/1
10 TABLET ORAL DAILY
Status: ON HOLD | COMMUNITY
End: 2020-01-22

## 2019-11-05 RX ORDER — OMEPRAZOLE 20 MG/1
40 CAPSULE, DELAYED RELEASE ORAL DAILY
Status: DISCONTINUED | OUTPATIENT
Start: 2019-11-06 | End: 2019-11-10 | Stop reason: HOSPADM

## 2019-11-05 RX ORDER — ACETAMINOPHEN 325 MG/1
650 TABLET ORAL EVERY 6 HOURS PRN
Status: DISCONTINUED | OUTPATIENT
Start: 2019-11-05 | End: 2019-11-05

## 2019-11-05 RX ORDER — HYDROCODONE BITARTRATE AND ACETAMINOPHEN 10; 325 MG/1; MG/1
1 TABLET ORAL EVERY 6 HOURS PRN
Status: DISCONTINUED | OUTPATIENT
Start: 2019-11-05 | End: 2019-11-09

## 2019-11-05 RX ORDER — AMOXICILLIN 250 MG
2 CAPSULE ORAL 2 TIMES DAILY
Status: DISCONTINUED | OUTPATIENT
Start: 2019-11-05 | End: 2019-11-10 | Stop reason: HOSPADM

## 2019-11-05 RX ORDER — CEFAZOLIN SODIUM 2 G/100ML
2 INJECTION, SOLUTION INTRAVENOUS ONCE
Status: COMPLETED | OUTPATIENT
Start: 2019-11-05 | End: 2019-11-05

## 2019-11-05 RX ADMIN — MORPHINE SULFATE 15 MG: 15 TABLET, EXTENDED RELEASE ORAL at 16:51

## 2019-11-05 RX ADMIN — HYDROCODONE BITARTRATE AND ACETAMINOPHEN 1 TABLET: 10; 325 TABLET ORAL at 16:52

## 2019-11-05 RX ADMIN — HYDROCODONE BITARTRATE AND ACETAMINOPHEN 1 TABLET: 10; 325 TABLET ORAL at 22:30

## 2019-11-05 RX ADMIN — IOHEXOL 45 ML: 350 INJECTION, SOLUTION INTRAVENOUS at 17:34

## 2019-11-05 RX ADMIN — CEFAZOLIN SODIUM 2 G: 2 INJECTION, SOLUTION INTRAVENOUS at 16:01

## 2019-11-05 RX ADMIN — DILTIAZEM HYDROCHLORIDE 25 MG: 5 INJECTION INTRAVENOUS at 22:29

## 2019-11-05 RX ADMIN — FUROSEMIDE 20 MG: 20 TABLET ORAL at 22:29

## 2019-11-05 RX ADMIN — SENNOSIDES AND DOCUSATE SODIUM 2 TABLET: 8.6; 5 TABLET ORAL at 22:29

## 2019-11-05 RX ADMIN — DILTIAZEM HYDROCHLORIDE 20 MG: 5 INJECTION INTRAVENOUS at 15:10

## 2019-11-05 ASSESSMENT — ENCOUNTER SYMPTOMS
CLAUDICATION: 0
CONSTIPATION: 0
COUGH: 0
BLURRED VISION: 0
ORTHOPNEA: 0
VOMITING: 0
PHOTOPHOBIA: 0
DIARRHEA: 0
DOUBLE VISION: 0
WEIGHT LOSS: 0
NAUSEA: 0
FEVER: 0
HEMOPTYSIS: 0
PALPITATIONS: 1
CHILLS: 0
ABDOMINAL PAIN: 0
TINGLING: 0
HEARTBURN: 0
DIZZINESS: 0
SPUTUM PRODUCTION: 0
MYALGIAS: 0
HEADACHES: 0

## 2019-11-05 NOTE — ED TRIAGE NOTES
"Chief Complaint   Patient presents with   • Palpitations     with dizziness and SOB since 1245     Pt reports new onset of palpitations with dizziness and SOB since 1245, denies CP or LOC. No hx of afib or same s/s.     Recently discharged about a week ago for osteomyelitis, PICC to LUE in place for antibiotics. Stopped IM thinners x4days.     /106   Pulse (!) 156   Temp 36.8 °C (98.3 °F) (Temporal)   Resp 16   Ht 1.803 m (5' 11\")   Wt 97.5 kg (215 lb)   SpO2 97%   BMI 29.99 kg/m²     "

## 2019-11-05 NOTE — ED NOTES
Med rec complete per pt at bedside  Per pt he was on Lovenox 4 days ago while in hospital and then again in Rehab.  Allergies reviewed and updated.

## 2019-11-05 NOTE — ED PROVIDER NOTES
ED Provider Note    Scribed for Dr. Noe Simmons M.D. by Angela Mcneill. 11/5/2019  2:41 PM    Primary care provider: MARTÍN Turner  Means of arrival: EMS  History obtained from: Patient  History limited by: None    CHIEF COMPLAINT  Chief Complaint   Patient presents with   • Palpitations     with dizziness and SOB since 1245       HPI  Richard Hubbard II is a 44 y.o. male who presents to the Emergency Department for palpitations, dizziness, and shortness of breath onset 12:45 PM today. Patient notes he was getting up to walk around the house and took 3-5 steps when he started to feel dizzy, lightheaded, and felt as if he was going to have a syncopal episode. He additionally noticed an irregular heart beat for 5-10 minutes. He reports he has never had a similar episode in the past. Denies chest pain, fevers, hematuria, bloody stools, or chills. He reports a maternal history of atrial fibrillation and notes his mom recently had an ablation. He denies any history of diabetes or hypertension. He notes he is a former smoker.     Chart review indicated patient has a history of root dilation with moderate aortic stenosis. echocardiogram taken on in 12/2018 indicated mildly dilated right ventricle and dilated aortic root. He was admitted on 9/27/19 to this facility for worsening left leg pain secondary to a ground level fall. CT-pelvis taken at that time showed subacute fracture of the left superior pubic ramus near the symphysis. He underwent surgical debridement 10/9/19 and pathology was consistent with osteomyelitis. Systemic fungal infection over his chest and groin was additionally reported at that time. He was discharged on 11/2/19.      REVIEW OF SYSTEMS  Pertinent positives include palpitations, dizziness, shortness of breath, near syncope, and irregular heart beat. Pertinent negatives include no chest pain, fevers, hematuria, bloody stools, or chills. As above, all other systems  reviewed and are negative.   See HPI for further details.     PAST MEDICAL HISTORY   has a past medical history of ADD (attention deficit disorder), Alcohol abuse, Allergic rhinitis (5/21/2009), Anemia (09/2019), Anxiety (5/21/2009), Back pain (5/21/2009), Bipolar disorder (HCC), Bleeding ulcer (5/21/2009), Depression (5/21/2009), Eczema (5/21/2009), History of bleeding ulcers (2/12/2015), Hypertension, Insomnia (5/21/2009), Migraine (5/21/2009), Obesity, morbid (HCC) (5/21/2009), Pain (09/2019), Rheumatoid arteritis (HCC) (09/2019), Sleep apnea (5/21/2009), and Snoring.    SURGICAL HISTORY   has a past surgical history that includes gastric bypass laparoscopic (2000); cholecystectomy (2000); and irrigation & debridement ortho (Left, 10/9/2019).    SOCIAL HISTORY  Social History     Tobacco Use   • Smoking status: Current Every Day Smoker     Packs/day: 0.25     Years: 3.00     Pack years: 0.75     Types: Cigarettes, Cigars   • Smokeless tobacco: Never Used   • Tobacco comment: occasional cigar   Substance Use Topics   • Alcohol use: No     Alcohol/week: 0.0 oz     Comment: quit 2.5 yrs ago   • Drug use: No      Social History     Substance and Sexual Activity   Drug Use No       FAMILY HISTORY  Family History   Problem Relation Age of Onset   • Hypertension Mother    • Arthritis Mother    • Depression Mother    • Cancer Father         bladder   • Schizophrenia Father    • Cancer Maternal Grandmother         breast   • Heart Disease Maternal Grandmother    • Psychiatric Illness Other    • Stroke Maternal Aunt    • Other Neg Hx         no connective tissue disorders or known aortic disease       CURRENT MEDICATIONS  Current Outpatient Medications:   •  furosemide (LASIX) 20 MG Tab, Take 1 Tab by mouth 2 times a day., Disp: 60 Tab, Rfl: 1  •  losartan (COZAAR) 25 MG Tab, Take 1 Tab by mouth every day., Disp: 30 Tab, Rfl: 2  •  morphine ER (MS CONTIN) 15 MG Tab CR tablet, Take 1 Tab by mouth 3 times a day for 14 days.,  "Disp: 42 Tab, Rfl: 0  •  omeprazole (PRILOSEC) 40 MG delayed-release capsule, Take 1 Cap by mouth every day., Disp: 30 Cap, Rfl: 2  •  predniSONE (DELTASONE) 10 MG Tab, Take daily for 7 days, Disp: 7 Tab, Rfl: 0  •  pregabalin (LYRICA) 150 MG Cap, Take 1 Cap by mouth 3 times a day for 30 days., Disp: 90 Cap, Rfl: 2  •  HYDROcodone/acetaminophen (NORCO)  MG Tab, Take 1 Tab by mouth every 6 hours as needed for up to 14 days., Disp: 42 Tab, Rfl: 0  •  acetaminophen (TYLENOL) 500 MG Tab, Take 2 Tabs by mouth 3 times a day., Disp: 30 Tab, Rfl: 0  •  NS SOLN 100 mL with ceFAZolin 10 GM SOLR 2 g, 2 g by Intravenous route every 8 hours for 33 days., Disp: , Rfl:   •  vitamin D (VITAMIND D3) 1000 UNIT Tab, Take 1 Tab by mouth every day., Disp: 60 Tab, Rfl:       ALLERGIES  Allergies   Allergen Reactions   • Benadryl [Altaryl]      \"I get agitated\"   • Nsaids      Bleeding, \"I had a bleeding ulcer\"   • Phenergan [Promethazine Hcl]      \"I get very agitated\"   • Penicillins      \"Had some dizziness\"  Tolerated Unasyn 10/2019       PHYSICAL EXAM  VITAL SIGNS: /106   Pulse (!) 156   Temp 36.8 °C (98.3 °F) (Temporal)   Resp 16   Ht 1.803 m (5' 11\")   Wt 97.5 kg (215 lb)   SpO2 97%   BMI 29.99 kg/m²     Constitutional: Obese, Well developed, Well nourished, No distress, Non-toxic appearance.   HENT: Normocephalic, Atraumatic, Bilateral external ears normal, Oropharynx moist, No oral exudates.   Eyes: PERRLA, EOMI, Conjunctiva normal, No discharge.   Neck: No tenderness, Supple, No stridor.   Lymphatic: No lymphadenopathy noted.   Cardiovascular: irregular rhythm, tachycardic.  Thorax & Lungs: Clear to auscultation bilaterally, No respiratory distress, No wheezing, No crackles.   Abdomen: Well healing scar over abdomen, Soft, No tenderness, No masses, No pulsatile masses.   Skin: Midline healed scar on abdomen, Warm, Dry, No erythema, No rash.   Extremities:, Lower extremity bilateral trace pittine edema, Picc " line present on left upper extremity, covered, no surrounding erythema.  Musculoskeletal: No tenderness to palpation or major deformities noted.  Intact distal pulses  Neurologic: Awake, alert. Moves all extremities spontaneously.  Psychiatric: Affect normal, Judgment normal, Mood normal.       LABS  Results for orders placed or performed during the hospital encounter of 11/05/19   CBC WITH DIFFERENTIAL   Result Value Ref Range    WBC 6.1 4.8 - 10.8 K/uL    RBC 3.78 (L) 4.70 - 6.10 M/uL    Hemoglobin 11.1 (L) 14.0 - 18.0 g/dL    Hematocrit 35.6 (L) 42.0 - 52.0 %    MCV 94.2 81.4 - 97.8 fL    MCH 29.4 27.0 - 33.0 pg    MCHC 31.2 (L) 33.7 - 35.3 g/dL    RDW 68.3 (H) 35.9 - 50.0 fL    Platelet Count 170 164 - 446 K/uL    MPV 8.9 (L) 9.0 - 12.9 fL    Neutrophils-Polys 70.60 44.00 - 72.00 %    Lymphocytes 20.70 (L) 22.00 - 41.00 %    Monocytes 6.40 0.00 - 13.40 %    Eosinophils 0.70 0.00 - 6.90 %    Basophils 0.80 0.00 - 1.80 %    Immature Granulocytes 0.80 0.00 - 0.90 %    Nucleated RBC 0.00 /100 WBC    Neutrophils (Absolute) 4.27 1.82 - 7.42 K/uL    Lymphs (Absolute) 1.25 1.00 - 4.80 K/uL    Monos (Absolute) 0.39 0.00 - 0.85 K/uL    Eos (Absolute) 0.04 0.00 - 0.51 K/uL    Baso (Absolute) 0.05 0.00 - 0.12 K/uL    Immature Granulocytes (abs) 0.05 0.00 - 0.11 K/uL    NRBC (Absolute) 0.00 K/uL   COMP METABOLIC PANEL   Result Value Ref Range    Sodium 144 135 - 145 mmol/L    Potassium 4.2 3.6 - 5.5 mmol/L    Chloride 111 96 - 112 mmol/L    Co2 24 20 - 33 mmol/L    Anion Gap 9.0 0.0 - 11.9    Glucose 98 65 - 99 mg/dL    Bun 8 8 - 22 mg/dL    Creatinine 0.51 0.50 - 1.40 mg/dL    Calcium 9.1 8.5 - 10.5 mg/dL    AST(SGOT) 29 12 - 45 U/L    ALT(SGPT) 14 2 - 50 U/L    Alkaline Phosphatase 66 30 - 99 U/L    Total Bilirubin 0.4 0.1 - 1.5 mg/dL    Albumin 3.6 3.2 - 4.9 g/dL    Total Protein 6.5 6.0 - 8.2 g/dL    Globulin 2.9 1.9 - 3.5 g/dL    A-G Ratio 1.2 g/dL   TROPONIN   Result Value Ref Range    Troponin T 7 6 - 19 ng/L    URINALYSIS CULTURE, IF INDICATED   Result Value Ref Range    Color Yellow     Character Clear     Specific Gravity 1.004 <1.035    Ph 7.0 5.0 - 8.0    Glucose Negative Negative mg/dL    Ketones Negative Negative mg/dL    Protein Negative Negative mg/dL    Bilirubin Negative Negative    Urobilinogen, Urine 0.2 Negative    Nitrite Negative Negative    Leukocyte Esterase Negative Negative    Occult Blood Negative Negative    Micro Urine Req see below    URINE DRUG SCREEN (TRIAGE)   Result Value Ref Range    Amphetamines Urine Negative Negative    Barbiturates Negative Negative    Benzodiazepines Negative Negative    Cocaine Metabolite Negative Negative    Methadone Negative Negative    Opiates Positive (A) Negative    Oxycodone Negative Negative    Phencyclidine -Pcp Negative Negative    Propoxyphene Negative Negative    Cannabinoid Metab Negative Negative   PERIPHERAL SMEAR REVIEW   Result Value Ref Range    Peripheral Smear Review see below    PLATELET ESTIMATE   Result Value Ref Range    Plt Estimation Normal    MORPHOLOGY   Result Value Ref Range    RBC Morphology Present     Poikilocytosis 1+     Ovalocytes 1+     Tear Drop Cells 1+    DIFFERENTIAL COMMENT   Result Value Ref Range    Comments-Diff see below    ESTIMATED GFR   Result Value Ref Range    GFR If African American >60 >60 mL/min/1.73 m 2    GFR If Non African American >60 >60 mL/min/1.73 m 2   FREE THYROXINE   Result Value Ref Range    Free T-4 0.97 0.53 - 1.43 ng/dL   TSH   Result Value Ref Range    TSH 0.930 0.380 - 5.330 uIU/mL   EKG   Result Value Ref Range    Report       [facility] Emergency Dept.    Test Date:  2019  Pt Name:    LESLIE BADILLO             Department: ER  MRN:        [id number]                      Room:        04  Gender:     Male                         Technician: [id number]  :        [id number]                   Requested By:ER TRIAGE PROTOCOL  Order #:    [id number]                    Reading  MD:    Measurements  Intervals                                Axis  Rate:       145                          P:  AZ:                                      QRS:        10  QRSD:       78                           T:          36  QT:         296  QTc:        460    Interpretive Statements  ATRIAL FIBRILLATION  PROBABLE POSTERIOR INFARCT  Compared to ECG 09/11/2019 15:42:00  Myocardial infarct finding now present  Sinus bradycardia no longer present  Intraventricular conduction delay no longer present         All labs reviewed by me.    EKG  Interpreted by me as seen above    RADIOLOGY  CT-CTA CHEST PULMONARY ARTERY W/ RECONS   Final Result      1.  No CT evidence of pulmonary embolism.      2.  Minimal faint pulmonary opacifications right upper pulmonary lobe are likely due to infectious or inflammatory process.            DX-CHEST-LIMITED (1 VIEW)   Final Result      1.  No acute cardiac or pulmonary abnormalities are identified.        The radiologist's interpretation of all radiological studies have been reviewed by me.    COURSE & MEDICAL DECISION MAKING  Pertinent Labs & Imaging studies reviewed. (See chart for details)    2:41 PM - Patient seen and examined at bedside. I informed the patient the need for labs and radiology to rule out any emergent processes. Currently awaiting results before deciding if intervention is necessary. Patient  verbalizes understanding and agreement to this plan of care. Patient will be treated with Cardizem injection 20 mg. Ordered DX-chest, urine drug screen, CBC with diff, CMP, troponin,  to evaluate his symptoms. The differential diagnoses include but are not limited to: A. Fib, sinus tachycardia, or SVT    3:46 PM - Patient was reevaluated at bedside. He is sitting up in bed. He notes his symptoms have improved since arriving.     4:07 PM - Paged Cardiology.    4:10 PM - I discussed the patient's case and the above findings with Cardiologist      6:10 PM - Reevaluated the patient  at bedside and informed him that his heart is still in atrial fibrillation. Discussed admitting patient due to past medical history. Patient verbalizes understanding and agreement to this plan of care.     6:15 PM Paged R IM.     6:18 PM - I discussed the patient's case and the above findings with [facility] who agreed to admit the patient.       Decision Making:  Patient with apparent new onset of atrial fibrillation with a rapid ventricular response.  Rate is somewhat controlled with administration of diltiazem remains in atrial fibrillation.cardioversion could be considerd after rate controlled and echo ok     DISPOSITION:  Patient will be admitted to [facility] in guarded condition.      FINAL IMPRESSION  1. Atrial fibrillation, unspecified type (HCC)    2. Atrial fibrillation with rapid ventricular response (HCC)          IAngela (Dottie), am scribing for, and in the presence of, Noe Simmons M.D..    Electronically signed by: Angela Mcneill (Dottie), 11/5/2019    INoe M.D. personally performed the services described in this documentation, as scribed by Angela Mcneill in my presence, and it is both accurate and complete. C    The note accurately reflects work and decisions made by me.  Noe Simmons  11/5/2019  7:17 PM

## 2019-11-06 ENCOUNTER — APPOINTMENT (OUTPATIENT)
Dept: CARDIOLOGY | Facility: MEDICAL CENTER | Age: 44
DRG: 309 | End: 2019-11-06
Attending: STUDENT IN AN ORGANIZED HEALTH CARE EDUCATION/TRAINING PROGRAM
Payer: COMMERCIAL

## 2019-11-06 LAB
ALBUMIN SERPL BCP-MCNC: 3.4 G/DL (ref 3.2–4.9)
ALBUMIN/GLOB SERPL: 1.3 G/DL
ALP SERPL-CCNC: 89 U/L (ref 30–99)
ALT SERPL-CCNC: 24 U/L (ref 2–50)
ANION GAP SERPL CALC-SCNC: 7 MMOL/L (ref 0–11.9)
AST SERPL-CCNC: 72 U/L (ref 12–45)
BASOPHILS # BLD AUTO: 0.6 % (ref 0–1.8)
BASOPHILS # BLD: 0.04 K/UL (ref 0–0.12)
BILIRUB SERPL-MCNC: 0.3 MG/DL (ref 0.1–1.5)
BUN SERPL-MCNC: 8 MG/DL (ref 8–22)
CALCIUM SERPL-MCNC: 8.8 MG/DL (ref 8.5–10.5)
CHLORIDE SERPL-SCNC: 108 MMOL/L (ref 96–112)
CO2 SERPL-SCNC: 28 MMOL/L (ref 20–33)
CREAT SERPL-MCNC: 0.56 MG/DL (ref 0.5–1.4)
EOSINOPHIL # BLD AUTO: 0.09 K/UL (ref 0–0.51)
EOSINOPHIL NFR BLD: 1.3 % (ref 0–6.9)
ERYTHROCYTE [DISTWIDTH] IN BLOOD BY AUTOMATED COUNT: 68.8 FL (ref 35.9–50)
GLOBULIN SER CALC-MCNC: 2.7 G/DL (ref 1.9–3.5)
GLUCOSE SERPL-MCNC: 87 MG/DL (ref 65–99)
HCT VFR BLD AUTO: 34.8 % (ref 42–52)
HGB BLD-MCNC: 11 G/DL (ref 14–18)
IMM GRANULOCYTES # BLD AUTO: 0.04 K/UL (ref 0–0.11)
IMM GRANULOCYTES NFR BLD AUTO: 0.6 % (ref 0–0.9)
LV EJECT FRACT  99904: 55
LV EJECT FRACT MOD 2C 99903: 45.11
LV EJECT FRACT MOD 4C 99902: 45.13
LV EJECT FRACT MOD BP 99901: 44.04
LYMPHOCYTES # BLD AUTO: 1.9 K/UL (ref 1–4.8)
LYMPHOCYTES NFR BLD: 27.9 % (ref 22–41)
MAGNESIUM SERPL-MCNC: 1.8 MG/DL (ref 1.5–2.5)
MCH RBC QN AUTO: 29.8 PG (ref 27–33)
MCHC RBC AUTO-ENTMCNC: 31.6 G/DL (ref 33.7–35.3)
MCV RBC AUTO: 94.3 FL (ref 81.4–97.8)
MONOCYTES # BLD AUTO: 0.62 K/UL (ref 0–0.85)
MONOCYTES NFR BLD AUTO: 9.1 % (ref 0–13.4)
NEUTROPHILS # BLD AUTO: 4.13 K/UL (ref 1.82–7.42)
NEUTROPHILS NFR BLD: 60.5 % (ref 44–72)
NRBC # BLD AUTO: 0 K/UL
NRBC BLD-RTO: 0 /100 WBC
PLATELET # BLD AUTO: 159 K/UL (ref 164–446)
PMV BLD AUTO: 8.9 FL (ref 9–12.9)
POTASSIUM SERPL-SCNC: 3.7 MMOL/L (ref 3.6–5.5)
PROT SERPL-MCNC: 6.1 G/DL (ref 6–8.2)
RBC # BLD AUTO: 3.69 M/UL (ref 4.7–6.1)
SODIUM SERPL-SCNC: 143 MMOL/L (ref 135–145)
TROPONIN T SERPL-MCNC: 13 NG/L (ref 6–19)
WBC # BLD AUTO: 6.8 K/UL (ref 4.8–10.8)

## 2019-11-06 PROCEDURE — A9270 NON-COVERED ITEM OR SERVICE: HCPCS | Performed by: STUDENT IN AN ORGANIZED HEALTH CARE EDUCATION/TRAINING PROGRAM

## 2019-11-06 PROCEDURE — 770020 HCHG ROOM/CARE - TELE (206)

## 2019-11-06 PROCEDURE — 700102 HCHG RX REV CODE 250 W/ 637 OVERRIDE(OP): Performed by: STUDENT IN AN ORGANIZED HEALTH CARE EDUCATION/TRAINING PROGRAM

## 2019-11-06 PROCEDURE — A9270 NON-COVERED ITEM OR SERVICE: HCPCS | Performed by: INTERNAL MEDICINE

## 2019-11-06 PROCEDURE — 93306 TTE W/DOPPLER COMPLETE: CPT

## 2019-11-06 PROCEDURE — A9270 NON-COVERED ITEM OR SERVICE: HCPCS | Performed by: HOSPITALIST

## 2019-11-06 PROCEDURE — 83735 ASSAY OF MAGNESIUM: CPT

## 2019-11-06 PROCEDURE — 99255 IP/OBS CONSLTJ NEW/EST HI 80: CPT | Performed by: INTERNAL MEDICINE

## 2019-11-06 PROCEDURE — 700105 HCHG RX REV CODE 258

## 2019-11-06 PROCEDURE — 80053 COMPREHEN METABOLIC PANEL: CPT

## 2019-11-06 PROCEDURE — 700105 HCHG RX REV CODE 258: Performed by: STUDENT IN AN ORGANIZED HEALTH CARE EDUCATION/TRAINING PROGRAM

## 2019-11-06 PROCEDURE — 700111 HCHG RX REV CODE 636 W/ 250 OVERRIDE (IP): Performed by: STUDENT IN AN ORGANIZED HEALTH CARE EDUCATION/TRAINING PROGRAM

## 2019-11-06 PROCEDURE — 700102 HCHG RX REV CODE 250 W/ 637 OVERRIDE(OP): Performed by: INTERNAL MEDICINE

## 2019-11-06 PROCEDURE — 700111 HCHG RX REV CODE 636 W/ 250 OVERRIDE (IP): Performed by: INTERNAL MEDICINE

## 2019-11-06 PROCEDURE — 85025 COMPLETE CBC W/AUTO DIFF WBC: CPT

## 2019-11-06 PROCEDURE — 93306 TTE W/DOPPLER COMPLETE: CPT | Mod: 26 | Performed by: INTERNAL MEDICINE

## 2019-11-06 PROCEDURE — 99233 SBSQ HOSP IP/OBS HIGH 50: CPT | Mod: GC | Performed by: HOSPITALIST

## 2019-11-06 PROCEDURE — 700102 HCHG RX REV CODE 250 W/ 637 OVERRIDE(OP): Performed by: HOSPITALIST

## 2019-11-06 PROCEDURE — 36415 COLL VENOUS BLD VENIPUNCTURE: CPT

## 2019-11-06 PROCEDURE — 99358 PROLONG SERVICE W/O CONTACT: CPT | Performed by: INTERNAL MEDICINE

## 2019-11-06 RX ORDER — DIGOXIN 0.25 MG/ML
500 INJECTION INTRAMUSCULAR; INTRAVENOUS ONCE
Status: COMPLETED | OUTPATIENT
Start: 2019-11-06 | End: 2019-11-06

## 2019-11-06 RX ORDER — DIGOXIN 0.25 MG/ML
250 INJECTION INTRAMUSCULAR; INTRAVENOUS EVERY 6 HOURS
Status: COMPLETED | OUTPATIENT
Start: 2019-11-06 | End: 2019-11-07

## 2019-11-06 RX ORDER — DIGOXIN 125 MCG
125 TABLET ORAL DAILY
Status: DISCONTINUED | OUTPATIENT
Start: 2019-11-07 | End: 2019-11-10 | Stop reason: HOSPADM

## 2019-11-06 RX ORDER — CEFAZOLIN SODIUM 2 G/100ML
2 INJECTION, SOLUTION INTRAVENOUS EVERY 8 HOURS
Status: DISCONTINUED | OUTPATIENT
Start: 2019-11-06 | End: 2019-11-07

## 2019-11-06 RX ORDER — SODIUM CHLORIDE 9 MG/ML
INJECTION, SOLUTION INTRAVENOUS
Status: COMPLETED
Start: 2019-11-06 | End: 2019-11-06

## 2019-11-06 RX ORDER — MORPHINE SULFATE 15 MG/1
15 TABLET, FILM COATED, EXTENDED RELEASE ORAL EVERY 8 HOURS
Status: DISCONTINUED | OUTPATIENT
Start: 2019-11-06 | End: 2019-11-10 | Stop reason: HOSPADM

## 2019-11-06 RX ADMIN — SODIUM CHLORIDE 250 ML: 9 INJECTION, SOLUTION INTRAVENOUS at 01:15

## 2019-11-06 RX ADMIN — NICOTINE 7 MG: 7 PATCH, EXTENDED RELEASE TRANSDERMAL at 09:32

## 2019-11-06 RX ADMIN — ACETAMINOPHEN 1000 MG: 500 TABLET ORAL at 05:23

## 2019-11-06 RX ADMIN — HYDROCODONE BITARTRATE AND ACETAMINOPHEN 1 TABLET: 10; 325 TABLET ORAL at 18:20

## 2019-11-06 RX ADMIN — DIGOXIN 250 MCG: 0.25 INJECTION INTRAMUSCULAR; INTRAVENOUS at 20:22

## 2019-11-06 RX ADMIN — MORPHINE SULFATE 15 MG: 15 TABLET, EXTENDED RELEASE ORAL at 05:23

## 2019-11-06 RX ADMIN — DILTIAZEM HYDROCHLORIDE 30 MG: 30 TABLET, FILM COATED ORAL at 05:23

## 2019-11-06 RX ADMIN — MORPHINE SULFATE 15 MG: 15 TABLET, EXTENDED RELEASE ORAL at 21:52

## 2019-11-06 RX ADMIN — CEFAZOLIN SODIUM 2 G: 2 INJECTION, SOLUTION INTRAVENOUS at 13:33

## 2019-11-06 RX ADMIN — SODIUM CHLORIDE 2 G: 9 INJECTION, SOLUTION INTRAVENOUS at 01:15

## 2019-11-06 RX ADMIN — CEFAZOLIN SODIUM 2 G: 2 INJECTION, SOLUTION INTRAVENOUS at 21:49

## 2019-11-06 RX ADMIN — DIGOXIN 500 MCG: 0.25 INJECTION INTRAMUSCULAR; INTRAVENOUS at 14:40

## 2019-11-06 RX ADMIN — OMEPRAZOLE 40 MG: 20 CAPSULE, DELAYED RELEASE ORAL at 05:23

## 2019-11-06 RX ADMIN — PREGABALIN 150 MG: 150 CAPSULE ORAL at 18:21

## 2019-11-06 RX ADMIN — PREGABALIN 150 MG: 150 CAPSULE ORAL at 05:23

## 2019-11-06 RX ADMIN — FUROSEMIDE 20 MG: 20 TABLET ORAL at 21:51

## 2019-11-06 RX ADMIN — SENNOSIDES AND DOCUSATE SODIUM 2 TABLET: 8.6; 5 TABLET ORAL at 05:23

## 2019-11-06 RX ADMIN — SODIUM CHLORIDE 2 G: 9 INJECTION, SOLUTION INTRAVENOUS at 05:22

## 2019-11-06 RX ADMIN — HYDROCODONE BITARTRATE AND ACETAMINOPHEN 1 TABLET: 10; 325 TABLET ORAL at 12:13

## 2019-11-06 RX ADMIN — DILTIAZEM HYDROCHLORIDE 60 MG: 30 TABLET, FILM COATED ORAL at 18:21

## 2019-11-06 RX ADMIN — DILTIAZEM HYDROCHLORIDE 30 MG: 30 TABLET, FILM COATED ORAL at 12:14

## 2019-11-06 RX ADMIN — MORPHINE SULFATE 15 MG: 15 TABLET, EXTENDED RELEASE ORAL at 00:59

## 2019-11-06 RX ADMIN — MORPHINE SULFATE 15 MG: 15 TABLET, EXTENDED RELEASE ORAL at 13:33

## 2019-11-06 RX ADMIN — DILTIAZEM HYDROCHLORIDE 30 MG: 30 TABLET, FILM COATED ORAL at 00:58

## 2019-11-06 RX ADMIN — DILTIAZEM HYDROCHLORIDE 60 MG: 30 TABLET, FILM COATED ORAL at 23:29

## 2019-11-06 RX ADMIN — SENNOSIDES AND DOCUSATE SODIUM 2 TABLET: 8.6; 5 TABLET ORAL at 18:21

## 2019-11-06 RX ADMIN — PREDNISONE 10 MG: 10 TABLET ORAL at 05:23

## 2019-11-06 RX ADMIN — PREGABALIN 150 MG: 150 CAPSULE ORAL at 12:13

## 2019-11-06 RX ADMIN — ACETAMINOPHEN 1000 MG: 500 TABLET ORAL at 12:14

## 2019-11-06 RX ADMIN — ENOXAPARIN SODIUM 40 MG: 100 INJECTION SUBCUTANEOUS at 05:23

## 2019-11-06 RX ADMIN — LOSARTAN POTASSIUM 25 MG: 25 TABLET ORAL at 05:23

## 2019-11-06 RX ADMIN — HYDROCODONE BITARTRATE AND ACETAMINOPHEN 1 TABLET: 10; 325 TABLET ORAL at 05:23

## 2019-11-06 RX ADMIN — FUROSEMIDE 20 MG: 20 TABLET ORAL at 09:37

## 2019-11-06 ASSESSMENT — COGNITIVE AND FUNCTIONAL STATUS - GENERAL
SUGGESTED CMS G CODE MODIFIER MOBILITY: CL
MOBILITY SCORE: 14
CLIMB 3 TO 5 STEPS WITH RAILING: TOTAL
WALKING IN HOSPITAL ROOM: TOTAL
DAILY ACTIVITIY SCORE: 22
SUGGESTED CMS G CODE MODIFIER DAILY ACTIVITY: CJ
STANDING UP FROM CHAIR USING ARMS: TOTAL
HELP NEEDED FOR BATHING: A LITTLE
DRESSING REGULAR LOWER BODY CLOTHING: A LITTLE
MOVING FROM LYING ON BACK TO SITTING ON SIDE OF FLAT BED: A LITTLE

## 2019-11-06 ASSESSMENT — ENCOUNTER SYMPTOMS
COUGH: 0
DOUBLE VISION: 0
SENSORY CHANGE: 0
BRUISES/BLEEDS EASILY: 0
CHILLS: 0
SPEECH CHANGE: 0
ABDOMINAL PAIN: 0
DEPRESSION: 0
VOMITING: 0
BLURRED VISION: 0
MYALGIAS: 0
PHOTOPHOBIA: 0
HEARTBURN: 0
ORTHOPNEA: 0
LOSS OF CONSCIOUSNESS: 0
PALPITATIONS: 1
HEADACHES: 1
BACK PAIN: 0
DIZZINESS: 1
SHORTNESS OF BREATH: 0
FALLS: 0
HEMOPTYSIS: 0
NAUSEA: 0
FOCAL WEAKNESS: 0
FEVER: 0
NECK PAIN: 0
PND: 0
CLAUDICATION: 0

## 2019-11-06 ASSESSMENT — LIFESTYLE VARIABLES
EVER HAD A DRINK FIRST THING IN THE MORNING TO STEADY YOUR NERVES TO GET RID OF A HANGOVER: NO
HAVE YOU EVER FELT YOU SHOULD CUT DOWN ON YOUR DRINKING: NO
AVERAGE NUMBER OF DAYS PER WEEK YOU HAVE A DRINK CONTAINING ALCOHOL: 0
TOTAL SCORE: 0
HAVE PEOPLE ANNOYED YOU BY CRITICIZING YOUR DRINKING: NO
HOW MANY TIMES IN THE PAST YEAR HAVE YOU HAD 5 OR MORE DRINKS IN A DAY: 0
TOTAL SCORE: 0
DOES PATIENT WANT TO STOP DRINKING: CANNOT ASSESS
CONSUMPTION TOTAL: NEGATIVE
EVER FELT BAD OR GUILTY ABOUT YOUR DRINKING: NO
ON A TYPICAL DAY WHEN YOU DRINK ALCOHOL HOW MANY DRINKS DO YOU HAVE: 0
ALCOHOL_USE: NO
TOTAL SCORE: 0

## 2019-11-06 ASSESSMENT — COPD QUESTIONNAIRES
IN THE PAST 12 MONTHS DO YOU DO LESS THAN YOU USED TO BECAUSE OF YOUR BREATHING PROBLEMS: DISAGREE/UNSURE
DO YOU EVER COUGH UP ANY MUCUS OR PHLEGM?: NO/ONLY WITH OCCASIONAL COLDS OR INFECTIONS
DURING THE PAST 4 WEEKS HOW MUCH DID YOU FEEL SHORT OF BREATH: SOME OF THE TIME
HAVE YOU SMOKED AT LEAST 100 CIGARETTES IN YOUR ENTIRE LIFE: YES
COPD SCREENING SCORE: 3

## 2019-11-06 NOTE — ED NOTES
"Pt also now reports he had 2x sensation of \"what felt and sounded like liquid going up around my sinuses, it's new to me so I figured I'd tell you.\" ERP made aware.   "

## 2019-11-06 NOTE — ASSESSMENT & PLAN NOTE
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                Plan:  -continue IV cefazolin   -continue    -Home pain management

## 2019-11-06 NOTE — SENIOR ADMIT NOTE
"SENIOR ADMIT NOTE:    Georgina Abdul M.D.  Date & Time note created:    11/6/2019   2:24 AM       Chief Complaint:  Palpitations    History of Present Illness:    The patient is a 44 year old male with a history of aortic root dilatation, moderate AS, PUD, lorin en y bypass, RA on chronic prednisone, and MONICA. The patient states that earlier this morning he felt some palpitations as well as some lightheadedness and dizziness. He presented to the ED and was found to be in Afib with RVR with HR in the 160s-170s. Cardiology was consulted in the ED and did not feel strongly about cardioverting the patient in the ED and will consider it in the AM. The patient's previous echo done in August showed an EF of 65%. The patient was given a push of diltiazem and it brought down the HR just about 20%. However, HR was still sustained in the 120s-140s. I gave the patient another push of diltiazem, 25mg. This brought his HR down to the 90s. We then started him on PO diltiazem and the patient has been doing well since. Cards to see the patient in the AM.     In the ED there was concern of PE. The patient had a CTA which was negative.     Physical Exam:  Weight/BMI: Body mass index is 32.9 kg/m².  /91   Pulse 93   Temp 36.8 °C (98.3 °F) (Temporal)   Resp 17   Ht 1.803 m (5' 11\")   Wt 107 kg (235 lb 14.3 oz)   SpO2 96%   Vitals:    11/05/19 1932 11/05/19 2032 11/05/19 2114 11/05/19 2355   BP: 137/89 104/66 135/98 150/91   Pulse: (!) 124 (!) 157 (!) 142 93   Resp:   18 17   Temp:   36.8 °C (98.2 °F) 36.8 °C (98.3 °F)   TempSrc:   Temporal Temporal   SpO2: 95% 94% 97% 96%   Weight:   107 kg (235 lb 14.3 oz)    Height:         Oxygen Therapy:  Pulse Oximetry: 96 %, O2 (LPM): 0, O2 Delivery: None (Room Air)    Physical exam:  Constitutional:  No acute distress. A&O x3  HENMT:  Normocephalic, Atraumatic  Eyes:  PERRLA, EOMI, Conjunctiva normal  Neck:  Supple, Full range of motion, No stridor  Cardiovascular:  Tachycardic with " irregular rhythm, No murmurs, No rubs, No gallops.   Lungs: Respiratory effort is normal, no crackles, no wheezing.  Extremities: Good pedal pulses b/l, no edema, 5/5 strength.  Abdomen: Bowel sounds x4, Soft, Non-tender, Non-distended, No guarding, No rebound, No masses.  Neurologic: Good sensations, Cranial nerves II through XII grossly intact. No focal deficits noted.    Imaging  CT-CTA CHEST PULMONARY ARTERY W/ RECONS   Final Result      1.  No CT evidence of pulmonary embolism.      2.  Minimal faint pulmonary opacifications right upper pulmonary lobe are likely due to infectious or inflammatory process.            DX-CHEST-LIMITED (1 VIEW)   Final Result      1.  No acute cardiac or pulmonary abnormalities are identified.      EC-ECHOCARDIOGRAM COMPLETE W/O CONT    (Results Pending)       ASSESSMENT/PLAN:  # Atrial fibrillation with RVR  - May be secondary to right heart strain from MONICA  - Patient is now rate controlled with 2 pushes of Diltiazem and is now on PO diltiazem 30mg QID with blood pressure parameters (so far the patient's pressures have been well).   - Admit to telemetry with cardiac monitoring  - TTE for now. Cardiology to see patient in AM and can consider cardioversion and MIA.   - NPO at midnight in case of cardioversion  - Can consider a drip if PO diltiazem is not working.  - Patient is refusing a cardiac diet. We will put him on a regular one once cardiology clears him with no caffeine.     For full details please refer to H&P done by Dr. Tae Abdul M.D.

## 2019-11-06 NOTE — H&P
Internal Medicine Admitting History and Physical    Note Author: Dorcas Strong M.D.       Name Richard Hubbard     1975   Age/Sex 44 y.o. male   MRN [id number]   Code Status full     After 5PM or if no immediate response to page, please call for cross-coverage  Attending/Team:Dr almanza/aliya  See Patient List for primary contact information  Call [phone] to page    1st Call - Day Intern (R1):   Dr omer 2nd Call - Day Sr. Resident (R2/R3):   Dr Molina        Chief Complaint:   Palpitations with dizziness and shortness of breath.    HPI:  Mr. Ramos is a 44-year-old male with a past medical history of aortic root dilation and moderate aortic stenosis, PUD,Lucila-en-Y bypass , rheumatoid arthritis (on prednisone) ,, obstructive sleep apnea presented to the emergency department presented to the ED  today due to new onset atrial fibrillation with rapid ventricular response. Mr. 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 Patient states that he was feeling fine since discharge but today when he got up to walk around the house he felt dizzy, lightheadedness and palpitations after taking few steps, patient states that he did not lose consciousness and went to bed to take some rest.  He denies any chest pain, nausea vomiting, diaphoresis.  He has never experienced such episodes in the past.Denies alcohol intake.  Patient has history of aortic root dilation with moderate aortic stenosis, he underwent echo on 2019 due to edema in bilateral feet, echo showed ejection fraction of 65%, he was started on furosemide for swelling in his legs.  He denies any history of heart attack in the past.    In the ED his  heart rate was 190 at the time of admission, he received diltiazem which decreased his heart 130-140 but remained in atrial fibrillation.  His blood pressure was 137 /89 he is saturating in 90s on room air.  His electrolytes are within normal limits, UDS negative, troponin VII, TSH 0.90, CTA showed no PE, chest x-ray negative.    Review of Systems   Constitutional: Negative for chills, fever, malaise/fatigue and weight loss.   HENT: Negative for hearing loss and tinnitus.    Eyes: Negative for blurred vision, double vision and photophobia.   Respiratory: Negative for cough, hemoptysis and sputum production.    Cardiovascular: Positive for palpitations and leg swelling. Negative for chest pain, orthopnea and claudication.   Gastrointestinal: Negative for abdominal pain, constipation, diarrhea, heartburn, nausea and vomiting.   Genitourinary: Negative for dysuria, frequency and urgency.   Musculoskeletal: Negative for myalgias.   Neurological: Negative for dizziness, tingling and headaches.             Past Medical History (Chronic medical problem, known complications and current treatment)    Past Medical History:   Diagnosis Date   • ADD (attention deficit disorder)    • Alcohol abuse    • Allergic rhinitis 5/21/2009   • Anemia 09/2019   • Anxiety 5/21/2009   • Back pain 5/21/2009   • Bipolar disorder (HCC)    • Bleeding ulcer 5/21/2009   • Depression 5/21/2009   • Eczema 5/21/2009   • History of bleeding ulcers 2/12/2015   • Hypertension    • Insomnia 5/21/2009   • Migraine 5/21/2009   • Obesity, morbid (HCC) 5/21/2009   • Pain 09/2019    Chronic knee and back pain   • Rheumatoid arteritis (HCC) 09/2019    knee, feet   • Sleep apnea 5/21/2009    Did not tolerate cpap, does not use it   • Snoring         Past Surgical History:  Past Surgical History:   Procedure Laterality Date   • IRRIGATION & DEBRIDEMENT ORTHO Left 10/9/2019    Procedure: IRRIGATION AND DEBRIDEMENT, WOUND - PELVIC OSTEOMYELITIS;  Surgeon: You ANTONIO  "MARCUS Olivares;  Location: SURGERY [facility];  Service: Orthopedics   • GASTRIC BYPASS LAPAROSCOPIC  2000    Lucila en y   • CHOLECYSTECTOMY  2000       Current Outpatient Medications:  Home Medications     Reviewed by Gael Rea (Pharmacy Tech) on 11/05/19 at 1514  Med List Status: Complete   Medication Last Dose Status   acetaminophen (TYLENOL) 500 MG Tab 11/5/2019 Active   furosemide (LASIX) 20 MG Tab 11/5/2019 Active   HYDROcodone/acetaminophen (NORCO)  MG Tab 11/5/2019 Active   losartan (COZAAR) 25 MG Tab 11/5/2019 Active   morphine ER (MS CONTIN) 15 MG Tab CR tablet 11/5/2019 Active   nicotine (NICODERM) 7 MG/24HR PATCH 24 HR 11/5/2019 Active   NS SOLN 100 mL with ceFAZolin 10 GM SOLR 2 g 11/5/2019 Active   omeprazole (PRILOSEC) 40 MG delayed-release capsule 11/5/2019 Active   predniSONE (DELTASONE) 10 MG Tab 11/5/2019 Active   pregabalin (LYRICA) 150 MG Cap 11/5/2019 Active   vitamin D (VITAMIND D3) 1000 UNIT Tab 11/5/2019 Active                Medication Allergy/Sensitivities:  Allergies   Allergen Reactions   • Benadryl [Altaryl]      \"I get agitated\"   • Nsaids      Bleeding, \"I had a bleeding ulcer\"   • Phenergan [Promethazine Hcl]      \"I get very agitated\"   • Penicillins      \"Had some dizziness\"  Tolerated Unasyn 10/2019         Family History (mandatory)   Family History   Problem Relation Age of Onset   • Hypertension Mother    • Arthritis Mother    • Depression Mother    • Cancer Father         bladder   • Schizophrenia Father    • Cancer Maternal Grandmother         breast   • Heart Disease Maternal Grandmother    • Psychiatric Illness Other    • Stroke Maternal Aunt    • Other Neg Hx         no connective tissue disorders or known aortic disease       Social History (mandatory)   No alcohol   1 pack a week, quit 1 week ago   No illicit drug use    Social History     Socioeconomic History   • Marital status:      Spouse name: Not on file   • Number of children: Not on " file   • Years of education: Not on file   • Highest education level: Not on file   Occupational History   • 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   Social Needs   • Financial resource strain: Not on file   • Food insecurity:     Worry: Not on file     Inability: Not on file   • Transportation needs:     Medical: Not on file     Non-medical: Not on file   Tobacco Use   • Smoking status: Current Every Day Smoker     Packs/day: 0.25     Years: 3.00     Pack years: 0.75     Types: Cigarettes, Cigars   • Smokeless tobacco: Never Used   • Tobacco comment: occasional cigar   Substance and Sexual Activity   • Alcohol use: No     Alcohol/week: 0.0 oz     Comment: quit 2.5 yrs ago   • Drug use: No   • Sexual activity: Yes     Partners: Female     Comment: ;    Lifestyle   • Physical activity:     Days per week: Not on file     Minutes per session: Not on file   • Stress: Not on file   Relationships   • Social connections:     Talks on phone: Not on file     Gets together: Not on file     Attends Worship service: Not on file     Active member of club or organization: Not on file     Attends meetings of clubs or organizations: Not on file     Relationship status: Not on file   • Intimate partner violence:     Fear of current or ex partner: Not on file     Emotionally abused: Not on file     Physically abused: Not on file     Forced sexual activity: Not on file   Other Topics Concern   •  Service No   • Blood Transfusions Yes   • Caffeine Concern No   • Occupational Exposure No   • Hobby Hazards No   • Sleep Concern No   • Stress Concern Yes   • Weight Concern Yes   • Special Diet No   • Back Care No   • Exercise No   • Bike Helmet No   • Seat Belt Yes   • Self-Exams Yes   Social History Narrative    , lives in [place]     Living situation:lives with wife  PCP : MARTÍN Turner    Physical Exam     Vitals:    11/05/19 1832 11/05/19 1932 11/05/19 2032 11/05/19 2114   BP: 128/96 137/89 104/66 135/98    Pulse: (!) 133 (!) 124 (!) 157 (!) 142   Resp: 20   18   Temp:    36.8 °C (98.2 °F)   TempSrc:    Temporal   SpO2: 94% 95% 94% 97%   Weight:    107 kg (235 lb 14.3 oz)   Height:         Body mass index is 32.9 kg/m².  O2 therapy: Pulse Oximetry: 97 %, O2 (LPM): 0, O2 Delivery: None (Room Air)    Physical Exam   Constitutional: He is oriented to person, place, and time and well-developed, well-nourished, and in no distress.   HENT:   Head: Normocephalic and atraumatic.   Eyes: Pupils are equal, round, and reactive to light. EOM are normal. Right eye exhibits no discharge.   Neck: Normal range of motion. Neck supple. No thyromegaly present.   Cardiovascular: Regular rhythm and intact distal pulses. Exam reveals no gallop and no friction rub.   Murmur heard.  +1 edema right lower extremity    Pulmonary/Chest: Effort normal and breath sounds normal. No respiratory distress. He has no wheezes.   Abdominal: Soft. Bowel sounds are normal. He exhibits no distension. There is no tenderness. There is no rebound.   Musculoskeletal: Normal range of motion.   Neurological: He is alert and oriented to person, place, and time. No cranial nerve deficit. Gait normal. Coordination normal.   Skin: Skin is warm and dry. No erythema.   Psychiatric: Affect normal.         Data Review       Old Records Request:   Completed  Current Records review/summary: Completed    Lab Data Review:  Recent Results (from the past 24 hour(s))   CBC WITH DIFFERENTIAL    Collection Time: 11/05/19  2:42 PM   Result Value Ref Range    WBC 6.1 4.8 - 10.8 K/uL    RBC 3.78 (L) 4.70 - 6.10 M/uL    Hemoglobin 11.1 (L) 14.0 - 18.0 g/dL    Hematocrit 35.6 (L) 42.0 - 52.0 %    MCV 94.2 81.4 - 97.8 fL    MCH 29.4 27.0 - 33.0 pg    MCHC 31.2 (L) 33.7 - 35.3 g/dL    RDW 68.3 (H) 35.9 - 50.0 fL    Platelet Count 170 164 - 446 K/uL    MPV 8.9 (L) 9.0 - 12.9 fL    Neutrophils-Polys 70.60 44.00 - 72.00 %    Lymphocytes 20.70 (L) 22.00 - 41.00 %    Monocytes 6.40 0.00 -  13.40 %    Eosinophils 0.70 0.00 - 6.90 %    Basophils 0.80 0.00 - 1.80 %    Immature Granulocytes 0.80 0.00 - 0.90 %    Nucleated RBC 0.00 /100 WBC    Neutrophils (Absolute) 4.27 1.82 - 7.42 K/uL    Lymphs (Absolute) 1.25 1.00 - 4.80 K/uL    Monos (Absolute) 0.39 0.00 - 0.85 K/uL    Eos (Absolute) 0.04 0.00 - 0.51 K/uL    Baso (Absolute) 0.05 0.00 - 0.12 K/uL    Immature Granulocytes (abs) 0.05 0.00 - 0.11 K/uL    NRBC (Absolute) 0.00 K/uL   COMP METABOLIC PANEL    Collection Time: 11/05/19  2:42 PM   Result Value Ref Range    Sodium 144 135 - 145 mmol/L    Potassium 4.2 3.6 - 5.5 mmol/L    Chloride 111 96 - 112 mmol/L    Co2 24 20 - 33 mmol/L    Anion Gap 9.0 0.0 - 11.9    Glucose 98 65 - 99 mg/dL    Bun 8 8 - 22 mg/dL    Creatinine 0.51 0.50 - 1.40 mg/dL    Calcium 9.1 8.5 - 10.5 mg/dL    AST(SGOT) 29 12 - 45 U/L    ALT(SGPT) 14 2 - 50 U/L    Alkaline Phosphatase 66 30 - 99 U/L    Total Bilirubin 0.4 0.1 - 1.5 mg/dL    Albumin 3.6 3.2 - 4.9 g/dL    Total Protein 6.5 6.0 - 8.2 g/dL    Globulin 2.9 1.9 - 3.5 g/dL    A-G Ratio 1.2 g/dL   TROPONIN    Collection Time: 11/05/19  2:42 PM   Result Value Ref Range    Troponin T 7 6 - 19 ng/L   PERIPHERAL SMEAR REVIEW    Collection Time: 11/05/19  2:42 PM   Result Value Ref Range    Peripheral Smear Review see below    PLATELET ESTIMATE    Collection Time: 11/05/19  2:42 PM   Result Value Ref Range    Plt Estimation Normal    MORPHOLOGY    Collection Time: 11/05/19  2:42 PM   Result Value Ref Range    RBC Morphology Present     Poikilocytosis 1+     Ovalocytes 1+     Tear Drop Cells 1+    DIFFERENTIAL COMMENT    Collection Time: 11/05/19  2:42 PM   Result Value Ref Range    Comments-Diff see below    ESTIMATED GFR    Collection Time: 11/05/19  2:42 PM   Result Value Ref Range    GFR If African American >60 >60 mL/min/1.73 m 2    GFR If Non African American >60 >60 mL/min/1.73 m 2   FREE THYROXINE    Collection Time: 11/05/19  2:42 PM   Result Value Ref Range    Free T-4  0.97 0.53 - 1.43 ng/dL   TSH    Collection Time: 19  2:42 PM   Result Value Ref Range    TSH 0.930 0.380 - 5.330 uIU/mL   EKG    Collection Time: 19  2:45 PM   Result Value Ref Range    Report       [facility] Emergency Dept.    Test Date:  2019  Pt Name:    LESLIE BADILLO             Department: 171  MRN:        [id number]                      Room:       T724  Gender:     Male                         Technician: [id number]  :        1975                   Requested By:NOEMI SHOEMAKER  Order #:    [id number]                    Reading MD:    Measurements  Intervals                                Axis  Rate:       150                          P:  MO:                                      QRS:        0  QRSD:       90                           T:          55  QT:         304  QTc:        481    Interpretive Statements  PACEMAKER SPIKES OR ARTIFACTS  ATRIAL FIBRILLATION  EARLY PRECORDIAL R/S TRANSITION  BORDERLINE PROLONGED QT INTERVAL  ARTIFACT IN LEAD(S) II,III,aVR,aVL,aVF,V5,V6  Compared to ECG 2019 14:44:02  Ventricular premature complex(es) no longer present     EKG    Collection Time: 19  2:46 PM   Result Value Ref Range    Report       [facility] Emergency Dept.    Test Date:  2019  Pt Name:    LESLIE BADILLO             Department: ER  MRN:        [id number]                      Room:       RD 04  Gender:     Male                         Technician: [id number]  :        1975                   Requested By:ER TRIAGE PROTOCOL  Order #:    [id number]                    Reading MD:    Measurements  Intervals                                Axis  Rate:       145                          P:  MO:                                      QRS:        10  QRSD:       78                           T:          36  QT:         296  QTc:        460    Interpretive Statements  ATRIAL FIBRILLATION  PROBABLE POSTERIOR INFARCT  Compared to ECG 2019  15:42:00  Myocardial infarct finding now present  Sinus bradycardia no longer present  Intraventricular conduction delay no longer present     URINALYSIS CULTURE, IF INDICATED    Collection Time: 11/05/19  3:12 PM   Result Value Ref Range    Color Yellow     Character Clear     Specific Gravity 1.004 <1.035    Ph 7.0 5.0 - 8.0    Glucose Negative Negative mg/dL    Ketones Negative Negative mg/dL    Protein Negative Negative mg/dL    Bilirubin Negative Negative    Urobilinogen, Urine 0.2 Negative    Nitrite Negative Negative    Leukocyte Esterase Negative Negative    Occult Blood Negative Negative    Micro Urine Req see below    URINE DRUG SCREEN (TRIAGE)    Collection Time: 11/05/19  3:12 PM   Result Value Ref Range    Amphetamines Urine Negative Negative    Barbiturates Negative Negative    Benzodiazepines Negative Negative    Cocaine Metabolite Negative Negative    Methadone Negative Negative    Opiates Positive (A) Negative    Oxycodone Negative Negative    Phencyclidine -Pcp Negative Negative    Propoxyphene Negative Negative    Cannabinoid Metab Negative Negative       Imaging/Procedures Review:    Independant Imaging Review: Completed  CT-CTA CHEST PULMONARY ARTERY W/ RECONS   Final Result      1.  No CT evidence of pulmonary embolism.      2.  Minimal faint pulmonary opacifications right upper pulmonary lobe are likely due to infectious or inflammatory process.            DX-CHEST-LIMITED (1 VIEW)   Final Result      1.  No acute cardiac or pulmonary abnormalities are identified.      EC-ECHOCARDIOGRAM COMPLETE W/O CONT    (Results Pending)            EKG:   EKG Independent Review: Completed  QTc:460, HR: 130, atrial fibtillation , no ST/T changes     Records reviewed and summarized in current documentation :  Yes  [employer] teaching service handout given to patient:  Yes         Assessment/Plan     Atrial fibrillation with rapid ventricular response (HCC)  Assessment & Plan  -Patient presented with new onset  atrial fibrillation with rapid ventricular response  -he heart rate on the time of admission was not 190  -He was given 1 dose of diltiazem in the ED  -After first dose  his heart rate decreased to 130- 140  -Also complained of intermittent chest pain.  Plan  -Admit to medical floor/ telemetry  -Give second dose of diltiazem push with the goal heart rate of less than 110  -Ordered echo for tomorrow  -Cardiology consultation  -Repeat EKG for chest pain  -Consider cardioversion  -N.p.o. 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  -continue IV cefazolin   -continue  Home pain management     Essential hypertension- (present on admission)  Assessment & Plan  -/89 on admission  -history of leg edema   -continue losartan and lasix     Rheumatoid arthritis (HCC)- (present on admission)  Assessment & Plan  -History of rheumatoid arthritis on chronic immunosuppression  -History of peptic ulcer disease with GI bleed in the past  -On prednisone at home  -Continue prednisone  -Avoid NSAIDs      Anticipated Hospital stay:  >2 midnights        Quality Measures  Quality-Core Measures   Reviewed items::  EKG reviewed, Labs reviewed and Medications reviewed  Rivera catheter::  No Rivera  DVT prophylaxis pharmacological::  Enoxaparin (Lovenox)    PCP: MARTÍN Turner

## 2019-11-06 NOTE — DIETARY
"Nutrition services: Day 1 of admit.  Richard Hubbard II is a 44 y.o. male with admitting DX of Atrial Fibrillation   Consult received for MST 4 (Wt loss, no poor PO indicated)    RD unable to interview pt at bedside as pt with provider during 2nd attempt.     Assessment:  Height: 180.3 cm (5' 11\")  Weight: 107 kg (235 lb 14.3 oz)- Via Bed Scale  Body mass index is 32.9 kg/m²., BMI classification: Obesity Class I   Diet/Intake: Regular diet     Evaluation:   1. Hx of osteomyelitis, rheumatoid arthritis, noted hx of Lucila en y bypass.   2. Pt upgraded to regular diet this afternoon, no meals charted per ADL as has been NPO since admission. Per MST at admission, pt did not report poor appetite.   3. Per MST score, pt reported 34 lb or more wt loss within 3 months. Per chart review, pt weighed 253 lbs on 7/3/19, indicating potential 18 lb (7.1%) loss within ~3 months, which is not significsnt though worth noting.   4. Labs: AST 72  5. Meds: prednisone, lyrica, senokot  6. Last BM: PTA     Malnutrition Risk: Does not meet criteria at this time per ASPEN/ AND guidelines, to reevaluate as indicated.     Recommendations/Plan:  1. Encourage intake of meals  2. Document intake of all meals  as % taken in ADL's to provide interdisciplinary communication across all shifts.   3. Monitor weight.  4. Nutrition rep will continue to see patient for ongoing meal and snack preferences.     RD following       "

## 2019-11-06 NOTE — ASSESSMENT & PLAN NOTE
-History of rheumatoid arthritis on chronic immunosuppression  -History of peptic ulcer disease with GI bleed in the past  -On prednisone at home     Plan:  -Continue prednisone  -Avoid NSAIDs

## 2019-11-06 NOTE — ASSESSMENT & PLAN NOTE
-Patient presented with new onset atrial fibrillation with rapid ventricular response  -Received 2 IV pushes Diltiazem in the ED  -Cardiology was Consulted  -Overnight A fib Rate   - After getting up HR went to 180's  - Cardio on board  - Echo: 55% EF, Moderate Aortic dilation 4.3 cm diameter   -MIA No atrial Thrombi seen  - Cardioverted, continues in Sinus Rhythm  - Stress test:   Artifact was noted in the inferior segment secondary to subdiaphragmatic attenuation (more artifact present in the stress images as significant gastric uptake in those images).  EF reported as mildly abnormal however on echocardiogram his EF is completely preserved.  No evidence of reversible defects noted.                   Plan:  -Xeralto 20mg  - Diltiazem   - Digoxin

## 2019-11-06 NOTE — ED NOTES
Pt reports he is due for his chronic pain medication for hip, 10mg norco and 15mg morphine, also due for 10mg lyrica and 20mg lasix. ERP made aware.

## 2019-11-06 NOTE — PROGRESS NOTES
2 RN Skin Check    2 RN skin check complete.   Devices in place: N/A.  Skin assessed under devices: yes.  Confirmed pressure ulcers found on: n/a.  New potential pressure ulcers noted on n/a. Wound consult placed N/A.  The following interventions in place Pillows.   Blanchable Redness on heels. Approximated incision from previous procedure on mid lower abd CDI.

## 2019-11-06 NOTE — ASSESSMENT & PLAN NOTE
-/89 on admission  -history of lower ext edema   - Soft Morning BP, patient asymptomatic       Plan:  -Lasix  -Losartan  -Low salt diet

## 2019-11-06 NOTE — PROGRESS NOTES
Spiritual Care Note    Patient Information     Patient's Name: Richard Hubbard II   MRN: [id number]    YOB: 1975   Age and Gender: 44 y.o. male   Service Area: ED [facility]   Room (and Bed):  04/04 RED   Ethnicity or Nationality:     Primary Language: English   Temple/Spiritual preference: Bahai   Place of Residence: [place], [place]   Family/Friends/Others Present: Yes, wife   Clinical Team Present: No   Medical Diagnosis(-es)/Procedure(s): Palpitations   Code Status: Prior    Date of Admission: 11/5/2019   Length of Stay: 0 days        Spiritual Care Provider Information:  Name of Spiritual Care Provider: Ivette Go  Title of Spiritual Care Provider: Associate   Phone Number: [phone]  E-mail: [email or web address]  Total time : 15 minutes    Spiritual Screen Results:    Gen Nursing        Palliative Care         Encounter/Request Information  Encounter/Request Type   Visited With: Patient and family together  Nature of the Visit: Initial  Continue Visiting: Yes  Next Follow-up Date: (Upon request)  General Visit: Yes  Referral From/ Origin of Request: [place] rounds    Religous Needs/Values  Temple Needs Visit  Temple Needs: Prayer  Ritual Needs Visit  Ritual Needs: [place]    Spiritual Assessment     Spiritual Care Encounters    Observations/Symptoms: Discouraged, Thankfulness    Interaction/Conversation: The pt recognized this , who had visited him several times when in was in rehab after pelvis surgery.  Pt reports that he was home for three days before going into afib; his wife was at the bedside.  Both were grateful for prayer and blessing from the .    Assessment: Need, Distress    Need: Seeking Spiritual Assistance and Support    Distress: Coping    Interventions: Prayer, blessing, conversation    Outcomes: Connectedness with the Holy/with God, Coping    Plan: Visit Upon Request    Notes:

## 2019-11-06 NOTE — CARE PLAN
Problem: Pain Management  Goal: Pain level will decrease to patient's comfort goal  Outcome: PROGRESSING AS EXPECTED     Problem: Safety  Goal: Will remain free from injury  Outcome: PROGRESSING AS EXPECTED  Goal: Will remain free from falls  Outcome: PROGRESSING AS EXPECTED  Intervention: Implement fall precautions  Flowsheets (Taken 11/5/2019 2200)  Environmental Precautions: Treaded Slipper Socks on Patient;Personal Belongings, Wastebasket, Call Bell etc. in Easy Reach;Bed in Low Position  Note:   Pt remains free from falls at this time. Safety precautions in place. Pt educated on calling for assistance when needed.        Problem: Knowledge Deficit  Goal: Knowledge of disease process/condition, treatment plan, diagnostic tests, and medications will improve  Outcome: PROGRESSING AS EXPECTED  Note:   Pt updated on POC, tests, and medications. Pt verbalizes understanding and has no further questions at this time. Pt educated on calling for any more questions.

## 2019-11-06 NOTE — CONSULTS
Cardiology Initial Consultation    Date of Service  11/6/2019    Referring Physician  Joaquin Metz M.D.    Reason for Consultation  Atrial fibrillation    History of Presenting Illness  Richard Hubbard II is a 44 y.o. male with no prior cardiac history who presented 11/5/2019 with palpitations.  Patient reports being in his usual state of health till yesterday around 1 PM when he had a sudden onset of palpitations associated with dyspnea and dizziness.  He denies having any history of similar symptoms in the past.  Due to severe symptoms he called EMS.  On EMS arrival he was found to be in A. fib with RVR and was transferred to our hospital for further management.  Since being in the hospital, he continues to have ongoing palpitations.  He tried to get up to go to the bathroom today and felt significantly worse along with dizziness.    Patient has known rheumatoid arthritis and used to be on Humira for this.  Patient has had multiple hospitalizations in the recent past.  In August patient presented with coffee-ground emesis and an endoscopy showed shallow ulcers without any evidence of active bleeding.  Patient was instructed to reduce his NSAID use however continued using it due to chronic knee pain and presented in September with melena.  His hemoglobin was found to be around 7 for which he received blood transfusions.  As the patient responded well to transfusion, repeat endoscopy was not performed.  Of note, patient has previously had peptic ulcer disease for which he has undergone cauterization.    In October patient was again admitted this time for left hip pain and he was found to have a left pubic ramus fracture.  He was thought to have a hematoma which on drainage was found to be an abscess following which he was diagnosed with osteomyelitis and underwent surgical debridement.  Patient received antibiotics and following this received steroid taper for a rheumatoid arthritis flare.  Patient is  still receiving antibiotics for his osteomyelitis.    He has a history of alcoholism which she quit about 3 or 4 years ago.  Denies any recreational drug use.  Drinks about 5 sodas a day if not more.    Review of Systems  Review of Systems   Constitutional: Negative for malaise/fatigue.   Respiratory: Negative for shortness of breath.    Cardiovascular: Positive for palpitations. Negative for chest pain, orthopnea, leg swelling and PND.   Gastrointestinal: Negative for abdominal pain.   Musculoskeletal: Negative for falls.   Neurological: Positive for dizziness. Negative for loss of consciousness.   Psychiatric/Behavioral: Negative for depression.   All other systems reviewed and are negative.      Past Medical History   has a past medical history of ADD (attention deficit disorder), Alcohol abuse, Allergic rhinitis (5/21/2009), Anemia (09/2019), Anxiety (5/21/2009), Back pain (5/21/2009), Bipolar disorder ([facility]), Bleeding ulcer (5/21/2009), Depression (5/21/2009), Eczema (5/21/2009), History of bleeding ulcers (2/12/2015), Hypertension, Insomnia (5/21/2009), Migraine (5/21/2009), Obesity, morbid (HCC) (5/21/2009), Pain (09/2019), Rheumatoid arteritis (HCC) (09/2019), Sleep apnea (5/21/2009), and Snoring. He also has no past medical history of Hyperlipidemia.    Surgical History   has a past surgical history that includes gastric bypass laparoscopic (2000); cholecystectomy (2000); and irrigation & debridement ortho (Left, 10/9/2019).    Family History  family history includes Arthritis in his mother; Cancer in his father and maternal grandmother; Depression in his mother; Heart Disease in his maternal grandmother; Hypertension in his mother; Psychiatric Illness in an other family member; Schizophrenia in his father; Stroke in his maternal aunt.    Social History   reports that he has been smoking cigarettes and cigars. He has a 0.75 pack-year smoking history. He has never used smokeless tobacco. He reports that he  does not drink alcohol or use drugs.    Home Medications   Prior to Admission Medications   Prescriptions Last Dose Informant Patient Reported? Taking?   HYDROcodone/acetaminophen (NORCO)  MG Tab 2019 at 0730  No No   Sig: Take 1 Tab by mouth every 6 hours as needed for up to 14 days.   NS SOLN 100 mL with ceFAZolin 10 GM SOLR 2 g 2019 at 0800  No No   Si g by Intravenous route every 8 hours for 33 days.   acetaminophen (TYLENOL) 500 MG Tab 2019 at 0730  No No   Sig: Take 2 Tabs by mouth 3 times a day.   furosemide (LASIX) 20 MG Tab 2019 at 0730  No No   Sig: Take 1 Tab by mouth 2 times a day.   losartan (COZAAR) 25 MG Tab 2019 at 0730  No No   Sig: Take 1 Tab by mouth every day.   morphine ER (MS CONTIN) 15 MG Tab CR tablet 2019 at 0730  No No   Sig: Take 1 Tab by mouth 3 times a day for 14 days.   nicotine (NICODERM) 7 MG/24HR PATCH 24 HR 2019 at 0800 on  Yes Yes   Sig: Apply 1 Patch to skin as directed every 24 hours.   omeprazole (PRILOSEC) 40 MG delayed-release capsule 2019 at 0730  No No   Sig: Take 1 Cap by mouth every day.   predniSONE (DELTASONE) 10 MG Tab 2019 at 0730  Yes Yes   Sig: Take 10 mg by mouth every day.   pregabalin (LYRICA) 150 MG Cap 2019 at 0730  No No   Sig: Take 1 Cap by mouth 3 times a day for 30 days.   vitamin D (VITAMIND D3) 1000 UNIT Tab 2019 at 0730  No No   Sig: Take 1 Tab by mouth every day.      Facility-Administered Medications: None       Inpatient Medications  • DILTIAZem  30 mg Q6HRS   • morphine ER  15 mg Q8HRS   • nicotine  7 mg Daily-0600   • ceFAZolin  2 g Q8HRS   • HYDROcodone/acetaminophen  1 Tab Q6HRS PRN   • senna-docusate  2 Tab BID    And   • polyethylene glycol/lytes  1 Packet QDAY PRN    And   • bisacodyl  10 mg QDAY PRN   • enoxaparin  40 mg DAILY   • acetaminophen  1,000 mg TID   • furosemide  20 mg BID   • losartan  25 mg DAILY   • omeprazole  40 mg DAILY   • predniSONE  10 mg DAILY   • pregabalin   "150 mg TID       Allergies  Allergies   Allergen Reactions   • Benadryl [Altaryl]      \"I get agitated\"   • Nsaids      Bleeding, \"I had a bleeding ulcer\"   • Phenergan [Promethazine Hcl]      \"I get very agitated\"   • Penicillins      \"Had some dizziness\"  Tolerated Unasyn 10/2019       Vital signs in last 24 hours  Temp:  [36.2 °C (97.1 °F)-36.8 °C (98.3 °F)] 36.3 °C (97.4 °F)  Pulse:  [] 76  Resp:  [16-20] 18  BP: ()/() 112/81  SpO2:  [94 %-99 %] 95 %    Physical Exam  Physical Exam   Constitutional: He is oriented to person, place, and time and well-developed, well-nourished, and in no distress. No distress.   HENT:   Head: Normocephalic and atraumatic.   Eyes: Conjunctivae are normal. No scleral icterus.   Neck: Normal range of motion. Neck supple. No JVD present.   Cardiovascular: Intact distal pulses. An irregular rhythm present. Tachycardia present. Exam reveals no gallop and no friction rub.   Murmur heard.   Crescendo decrescendo systolic murmur is present.  Pulmonary/Chest: Effort normal and breath sounds normal. No respiratory distress. He has no wheezes. He has no rales. He exhibits no tenderness.   Abdominal: Soft. Bowel sounds are normal. He exhibits no distension. There is no tenderness.   Musculoskeletal: Normal range of motion.         General: No edema.   Neurological: He is alert and oriented to person, place, and time.   Skin: Skin is warm and dry. No rash noted. He is not diaphoretic.   Psychiatric: Mood, affect and judgment normal.   Nursing note and vitals reviewed.      Lab Review  Recent Labs     11/05/19  1442 11/06/19  0315   WBC 6.1 6.8   RBC 3.78* 3.69*   HEMOGLOBIN 11.1* 11.0*   HEMATOCRIT 35.6* 34.8*   PLATELETCT 170 159*   MCV 94.2 94.3   MPV 8.9* 8.9*     Recent Labs     11/05/19  1442 11/06/19  0315   SODIUM 144 143   POTASSIUM 4.2 3.7   CHLORIDE 111 108   CO2 24 28   GLUCOSE 98 87   BUN 8 8   CREATININE 0.51 0.56      Lab Results   Component Value Date/Time    " TROPONINT 13 11/05/2019 11:17 PM       Lab Results   Component Value Date/Time    ASTSGOT 72 (H) 11/06/2019 03:15 AM    ALTSGPT 24 11/06/2019 03:15 AM     Lab Results   Component Value Date/Time    CHOLSTRLTOT 114 05/01/2019 03:11 PM    LDL 37 05/01/2019 03:11 PM    HDL 64 05/01/2019 03:11 PM    TRIGLYCERIDE 64 05/01/2019 03:11 PM         Labs reviewed as noted above.  Mild anemia.  Normal creatinine.    Cardiac Imaging and Procedures Review  EKG performed yesterday at 1445 hrs. was personally reviewed and per my interpretation shows atrial fibrillation with RVR in the 150s beats per minute.  Nonspecific T wave changes.    Echocardiogram performed today was personally reviewed and per my interpretation shows preserved LV systolic function.  Moderate aortic stenosis with a mean gradient of 23 mmHg.  Likely bicuspid aortic valve.  Ascending aorta is borderline dilated at about 4.0 cm.    Assessment/Plan    Active Problems:    Osteomyelitis (HCC) POA: Yes    Atrial fibrillation with rapid ventricular response (HCC) POA: Unknown    Rheumatoid arthritis (HCC) (Chronic) POA: Yes    Essential hypertension POA: Yes  Resolved Problems:    * No resolved hospital problems. *    Atrial fibrillation: New diagnosis for the patient.  Anemia:  Recent GI bleed:  Osteomyelitis:    Patient continues to be in RVR and continues to report ongoing symptoms.  The likely etiology of his atrial fibrillation is a combination of his anemia and active infection with underlying rheumatoid arthritis.  I advised the patient to minimize his caffeine intake.    He is only on a low-dose of diltiazem at this time. Increase diltiazem to 60 mg every 6 hours and continue to uptitrate as tolerated by blood pressure..  Start digoxin load.  Daily dosing of digoxin to start from tomorrow.    Ideally would like to consider rhythm control strategies like antiarrhythmics or cardioversion.  Unfortunately the patient was recently admitted with a GI bleed and was  diagnosed with peptic ulcer disease requiring transfusions.  Due to this history, he is not a candidate for anticoagulation at this time or antiplatelet therapy for that matter.  I have requested the primary team to get GI input on the safety of anticoagulation or antiplatelet therapy for this patient.  If he is considered a candidate for anticoagulation, we can consider proceeding with cardioversion.  However should we decide to proceed with cardioversion, he will need 30 days of uninterrupted anticoagulation post cardioversion.  I have explained the above to the patient in detail.    40 minutes of non-face-to-face time were spent reviewing the patient's extensive chart and recent admissions, prior notes, labs and personally reviewing the imaging studies and EKG and echocardiogram and discussing case with [facility] resident, Dr. Mcneill.  Start and stop time: 1:45-2:05 pm, 4:20 PM to 4:40 PM      Thank you for allowing me to participate in the care of this patient. Please do not hesitate to contact me with any questions.    Lourdes Alarcon MD, [facility]  Cardiologist  [facility]

## 2019-11-06 NOTE — PROGRESS NOTES
Pt stated he was having CP of 5/10 and SOB at 2130. HR sustaining in the 150s-160s. Ordered stat EKG. And paged MD for parameters for HR and pain. Pt stated 10 min later that the pain had subsided. MD gave orders for another 25mg dose of diltiazem IVP. HR now sustaining around 105. Pending possible diltiazem drip if HR elevates and sustains over 110 or jumps up to >140. Will continue to monitor. Pt c/o no pain at this time.

## 2019-11-06 NOTE — PROGRESS NOTES
Patient transported to floor from ed. Patient A&Ox4. Plan of care discussed with patient.  Patient oriented to floor and surroundings. Patient currently on room air. VSS. Patient is expressing any complaints of pain 6/10 at this time . Tele box placed. Monitor room notified. 2 rn skin check performed. Patient educated to call for assistance before ambulating. Patient education regarding fall risk. Call light within reach. Fall precautions in place.

## 2019-11-06 NOTE — ED NOTES
Pt informed RN that he has some CP that resolved while RN was at bedside, ERP made aware. Lab called regarding add ons.

## 2019-11-07 LAB
ANION GAP SERPL CALC-SCNC: 5 MMOL/L (ref 0–11.9)
APTT PPP: 25.8 SEC (ref 24.7–36)
APTT PPP: 57.7 SEC (ref 24.7–36)
BASOPHILS # BLD AUTO: 0.8 % (ref 0–1.8)
BASOPHILS # BLD: 0.05 K/UL (ref 0–0.12)
BUN SERPL-MCNC: 15 MG/DL (ref 8–22)
CALCIUM SERPL-MCNC: 8.9 MG/DL (ref 8.5–10.5)
CHLORIDE SERPL-SCNC: 104 MMOL/L (ref 96–112)
CO2 SERPL-SCNC: 29 MMOL/L (ref 20–33)
CREAT SERPL-MCNC: 0.73 MG/DL (ref 0.5–1.4)
EOSINOPHIL # BLD AUTO: 0.15 K/UL (ref 0–0.51)
EOSINOPHIL NFR BLD: 2.4 % (ref 0–6.9)
ERYTHROCYTE [DISTWIDTH] IN BLOOD BY AUTOMATED COUNT: 68.6 FL (ref 35.9–50)
GLUCOSE SERPL-MCNC: 93 MG/DL (ref 65–99)
HCT VFR BLD AUTO: 38.9 % (ref 42–52)
HGB BLD-MCNC: 11.8 G/DL (ref 14–18)
IMM GRANULOCYTES # BLD AUTO: 0.04 K/UL (ref 0–0.11)
IMM GRANULOCYTES NFR BLD AUTO: 0.6 % (ref 0–0.9)
LYMPHOCYTES # BLD AUTO: 1.93 K/UL (ref 1–4.8)
LYMPHOCYTES NFR BLD: 31.1 % (ref 22–41)
MCH RBC QN AUTO: 29.2 PG (ref 27–33)
MCHC RBC AUTO-ENTMCNC: 30.3 G/DL (ref 33.7–35.3)
MCV RBC AUTO: 96.3 FL (ref 81.4–97.8)
MONOCYTES # BLD AUTO: 0.54 K/UL (ref 0–0.85)
MONOCYTES NFR BLD AUTO: 8.7 % (ref 0–13.4)
NEUTROPHILS # BLD AUTO: 3.49 K/UL (ref 1.82–7.42)
NEUTROPHILS NFR BLD: 56.4 % (ref 44–72)
NRBC # BLD AUTO: 0 K/UL
NRBC BLD-RTO: 0 /100 WBC
PLATELET # BLD AUTO: 177 K/UL (ref 164–446)
PMV BLD AUTO: 9 FL (ref 9–12.9)
POTASSIUM SERPL-SCNC: 4.2 MMOL/L (ref 3.6–5.5)
RBC # BLD AUTO: 4.04 M/UL (ref 4.7–6.1)
SODIUM SERPL-SCNC: 138 MMOL/L (ref 135–145)
WBC # BLD AUTO: 6.2 K/UL (ref 4.8–10.8)

## 2019-11-07 PROCEDURE — 99233 SBSQ HOSP IP/OBS HIGH 50: CPT | Mod: GC | Performed by: HOSPITALIST

## 2019-11-07 PROCEDURE — 700102 HCHG RX REV CODE 250 W/ 637 OVERRIDE(OP): Performed by: STUDENT IN AN ORGANIZED HEALTH CARE EDUCATION/TRAINING PROGRAM

## 2019-11-07 PROCEDURE — 36415 COLL VENOUS BLD VENIPUNCTURE: CPT

## 2019-11-07 PROCEDURE — 700111 HCHG RX REV CODE 636 W/ 250 OVERRIDE (IP): Performed by: NURSE PRACTITIONER

## 2019-11-07 PROCEDURE — A9270 NON-COVERED ITEM OR SERVICE: HCPCS | Performed by: INTERNAL MEDICINE

## 2019-11-07 PROCEDURE — A9270 NON-COVERED ITEM OR SERVICE: HCPCS | Performed by: HOSPITALIST

## 2019-11-07 PROCEDURE — A9270 NON-COVERED ITEM OR SERVICE: HCPCS | Performed by: STUDENT IN AN ORGANIZED HEALTH CARE EDUCATION/TRAINING PROGRAM

## 2019-11-07 PROCEDURE — 700111 HCHG RX REV CODE 636 W/ 250 OVERRIDE (IP): Performed by: STUDENT IN AN ORGANIZED HEALTH CARE EDUCATION/TRAINING PROGRAM

## 2019-11-07 PROCEDURE — 770020 HCHG ROOM/CARE - TELE (206)

## 2019-11-07 PROCEDURE — 99232 SBSQ HOSP IP/OBS MODERATE 35: CPT | Performed by: INTERNAL MEDICINE

## 2019-11-07 PROCEDURE — 700102 HCHG RX REV CODE 250 W/ 637 OVERRIDE(OP): Performed by: INTERNAL MEDICINE

## 2019-11-07 PROCEDURE — 80048 BASIC METABOLIC PNL TOTAL CA: CPT

## 2019-11-07 PROCEDURE — 700102 HCHG RX REV CODE 250 W/ 637 OVERRIDE(OP): Performed by: HOSPITALIST

## 2019-11-07 PROCEDURE — 700111 HCHG RX REV CODE 636 W/ 250 OVERRIDE (IP): Performed by: INTERNAL MEDICINE

## 2019-11-07 PROCEDURE — 85025 COMPLETE CBC W/AUTO DIFF WBC: CPT

## 2019-11-07 PROCEDURE — 85730 THROMBOPLASTIN TIME PARTIAL: CPT

## 2019-11-07 RX ORDER — LOSARTAN POTASSIUM 25 MG/1
25 TABLET ORAL DAILY
Status: DISCONTINUED | OUTPATIENT
Start: 2019-11-08 | End: 2019-11-10 | Stop reason: HOSPADM

## 2019-11-07 RX ORDER — CEFAZOLIN SODIUM 2 G/100ML
2 INJECTION, SOLUTION INTRAVENOUS EVERY 8 HOURS
Status: DISCONTINUED | OUTPATIENT
Start: 2019-11-07 | End: 2019-11-10 | Stop reason: HOSPADM

## 2019-11-07 RX ORDER — HEPARIN SODIUM 1000 [USP'U]/ML
3800 INJECTION, SOLUTION INTRAVENOUS; SUBCUTANEOUS PRN
Status: DISCONTINUED | OUTPATIENT
Start: 2019-11-07 | End: 2019-11-09

## 2019-11-07 RX ORDER — HEPARIN SODIUM 5000 [USP'U]/100ML
INJECTION, SOLUTION INTRAVENOUS CONTINUOUS
Status: DISCONTINUED | OUTPATIENT
Start: 2019-11-07 | End: 2019-11-09

## 2019-11-07 RX ADMIN — DIGOXIN 250 MCG: 0.25 INJECTION INTRAMUSCULAR; INTRAVENOUS at 01:51

## 2019-11-07 RX ADMIN — PREGABALIN 150 MG: 150 CAPSULE ORAL at 06:28

## 2019-11-07 RX ADMIN — FUROSEMIDE 20 MG: 20 TABLET ORAL at 20:27

## 2019-11-07 RX ADMIN — LOSARTAN POTASSIUM 25 MG: 25 TABLET ORAL at 06:29

## 2019-11-07 RX ADMIN — DILTIAZEM HYDROCHLORIDE 90 MG: 30 TABLET, FILM COATED ORAL at 11:13

## 2019-11-07 RX ADMIN — CEFAZOLIN SODIUM 2 G: 2 INJECTION, SOLUTION INTRAVENOUS at 20:26

## 2019-11-07 RX ADMIN — PREDNISONE 10 MG: 10 TABLET ORAL at 06:28

## 2019-11-07 RX ADMIN — HYDROCODONE BITARTRATE AND ACETAMINOPHEN 1 TABLET: 10; 325 TABLET ORAL at 21:31

## 2019-11-07 RX ADMIN — HEPARIN SODIUM 1450 UNITS/HR: 5000 INJECTION, SOLUTION INTRAVENOUS at 14:50

## 2019-11-07 RX ADMIN — ENOXAPARIN SODIUM 40 MG: 100 INJECTION SUBCUTANEOUS at 06:30

## 2019-11-07 RX ADMIN — CEFAZOLIN SODIUM 2 G: 2 INJECTION, SOLUTION INTRAVENOUS at 06:32

## 2019-11-07 RX ADMIN — PREGABALIN 150 MG: 150 CAPSULE ORAL at 16:38

## 2019-11-07 RX ADMIN — HYDROCODONE BITARTRATE AND ACETAMINOPHEN 1 TABLET: 10; 325 TABLET ORAL at 01:13

## 2019-11-07 RX ADMIN — ACETAMINOPHEN 1000 MG: 500 TABLET ORAL at 11:13

## 2019-11-07 RX ADMIN — DIGOXIN 125 MCG: 125 TABLET ORAL at 16:38

## 2019-11-07 RX ADMIN — DILTIAZEM HYDROCHLORIDE 90 MG: 30 TABLET, FILM COATED ORAL at 16:37

## 2019-11-07 RX ADMIN — ACETAMINOPHEN 1000 MG: 500 TABLET ORAL at 16:38

## 2019-11-07 RX ADMIN — MORPHINE SULFATE 15 MG: 15 TABLET, EXTENDED RELEASE ORAL at 06:28

## 2019-11-07 RX ADMIN — MORPHINE SULFATE 15 MG: 15 TABLET, EXTENDED RELEASE ORAL at 14:32

## 2019-11-07 RX ADMIN — CEFAZOLIN SODIUM 2 G: 2 INJECTION, SOLUTION INTRAVENOUS at 14:34

## 2019-11-07 RX ADMIN — HYDROCODONE BITARTRATE AND ACETAMINOPHEN 1 TABLET: 10; 325 TABLET ORAL at 15:31

## 2019-11-07 RX ADMIN — OMEPRAZOLE 40 MG: 20 CAPSULE, DELAYED RELEASE ORAL at 06:29

## 2019-11-07 RX ADMIN — SENNOSIDES AND DOCUSATE SODIUM 2 TABLET: 8.6; 5 TABLET ORAL at 06:29

## 2019-11-07 RX ADMIN — MORPHINE SULFATE 15 MG: 15 TABLET, EXTENDED RELEASE ORAL at 20:26

## 2019-11-07 RX ADMIN — HYDROCODONE BITARTRATE AND ACETAMINOPHEN 1 TABLET: 10; 325 TABLET ORAL at 07:18

## 2019-11-07 RX ADMIN — NICOTINE 7 MG: 7 PATCH, EXTENDED RELEASE TRANSDERMAL at 06:25

## 2019-11-07 RX ADMIN — DILTIAZEM HYDROCHLORIDE 60 MG: 30 TABLET, FILM COATED ORAL at 06:30

## 2019-11-07 RX ADMIN — PREGABALIN 150 MG: 150 CAPSULE ORAL at 11:13

## 2019-11-07 RX ADMIN — SENNOSIDES AND DOCUSATE SODIUM 2 TABLET: 8.6; 5 TABLET ORAL at 16:38

## 2019-11-07 RX ADMIN — FUROSEMIDE 20 MG: 20 TABLET ORAL at 09:39

## 2019-11-07 ASSESSMENT — ENCOUNTER SYMPTOMS
HEADACHES: 0
DIZZINESS: 0
BRUISES/BLEEDS EASILY: 0
SHORTNESS OF BREATH: 0
ORTHOPNEA: 0
DEPRESSION: 0
APNEA: 0
PHOTOPHOBIA: 0
ABDOMINAL PAIN: 0
DOUBLE VISION: 0
COUGH: 0
BLURRED VISION: 0
PALPITATIONS: 0
HEARTBURN: 0
MYALGIAS: 0
FEVER: 0
SPUTUM PRODUCTION: 0
NAUSEA: 0
CHEST TIGHTNESS: 0
STRIDOR: 0
CHILLS: 0
WHEEZING: 0
CHOKING: 0

## 2019-11-07 NOTE — PROGRESS NOTES
Bedside report received from day shift RN, assumed pt care. Pt assessment complete. Pt alert and oriented, no signs of distress. Reviewed plan of care with pt. Tele box on and rhythm verified.  Chart and labs reviewed.  Bed in lowest position, call light within reach. Hourly rounding in place. Educated about calling for assistance.

## 2019-11-07 NOTE — PROGRESS NOTES
Faxed medical records request forms to [facility], and [facility].

## 2019-11-07 NOTE — DISCHARGE PLANNING
BEATRIS received call from Ivette at [facility]. Requesting order to resume IV antibiotics at home once D/C.

## 2019-11-07 NOTE — PROGRESS NOTES
Cardiology Follow Up Progress Note    Date of Service  11/7/2019    Attending Physician  Joaquin Metz M.D.      Admitted for sudden & acute onset of palpitations associated with dyspnea and dizziness, found to be in A. fib RVR, rate 150      Cardiology consultation for new onset A. fib RVR      History of: No prior cardiac history, known rheumatoid arthritis on Humira, hospitalized for coffee-ground emesis August 2019 and again melena in September, 2019 (hemoglobin was found to be 7 s/p blood transfusion), history of peptic ulcer disease status post cauterization, October 2019 found to have left pubic ramus fracture after a mechanical fall of [place] ( 2019 ) was diagnosed with osteomyelitis and underwent surgical debridement with antibiotics, quit alcohol intake 3 to 4 years ago    Interim Events  11/7/19 remains in afib RVR with exertion, HR at rest 80's, D/W Dr Agnes romerotitcandete Dilt for better rate control, frustrated with being in the hospital, needs R knee surgery at some point    Review of Systems  Review of Systems   Respiratory: Negative for apnea, cough, choking, chest tightness, shortness of breath, wheezing and stridor.    Cardiovascular: Negative for chest pain and leg swelling.       Vital signs in last 24 hours  Temp:  [36.3 °C (97.3 °F)-36.7 °C (98 °F)] 36.3 °C (97.3 °F)  Pulse:  [] 105  Resp:  [18] 18  BP: (108-122)/(58-84) 108/58  SpO2:  [95 %-97 %] 97 %    Physical Exam  Physical Exam  HENT:      Head: Normocephalic.      Mouth/Throat:      Mouth: Mucous membranes are moist.   Cardiovascular:      Rate and Rhythm: Tachycardia present. Rhythm irregular.      Heart sounds: Murmur present.   Pulmonary:      Effort: Pulmonary effort is normal.   Abdominal:      General: Abdomen is flat.   Skin:     General: Skin is warm.   Neurological:      Mental Status: He is alert and oriented to person, place, and time.         Lab Review  Lab Results   Component Value Date/Time    WBC 6.2 11/07/2019  03:45 AM    WBC 8.4 10/19/2010 12:05 PM    RBC 4.04 (L) 2019 03:45 AM    RBC 4.96 10/19/2010 12:05 PM    HEMOGLOBIN 11.8 (L) 2019 03:45 AM    HEMATOCRIT 38.9 (L) 2019 03:45 AM    MCV 96.3 2019 03:45 AM    MCV 85 10/19/2010 12:05 PM    MCH 29.2 2019 03:45 AM    MCH 28.4 10/19/2010 12:05 PM    MCHC 30.3 (L) 2019 03:45 AM    MPV 9.0 2019 03:45 AM      Lab Results   Component Value Date/Time    SODIUM 138 2019 03:45 AM    POTASSIUM 4.2 2019 03:45 AM    CHLORIDE 104 2019 03:45 AM    CO2 29 2019 03:45 AM    GLUCOSE 93 2019 03:45 AM    BUN 15 2019 03:45 AM    CREATININE 0.73 2019 03:45 AM    CREATININE 0.95 10/19/2010 12:05 PM    BUNCREATRAT 9 10/19/2010 12:05 PM    GLOMRATE >59 10/19/2010 12:05 PM      Lab Results   Component Value Date/Time    ASTSGOT 72 (H) 2019 03:15 AM    ALTSGPT 24 2019 03:15 AM     Lab Results   Component Value Date/Time    CHOLSTRLTOT 114 2019 03:11 PM    LDL 37 2019 03:11 PM    HDL 64 2019 03:11 PM    TRIGLYCERIDE 64 2019 03:11 PM    TROPONINT 13 2019 11:17 PM       No results for input(s): NTPROBNP in the last 72 hours.    Cardiac Imaging and Procedures Review      EK19, A. fib, rate 145    Echocardiogram: 19, normal LV systolic function, moderate aortic stenosis, peak 40 mmHg, mean 23 mmHg, suspect bicuspid aortic valve, ascending aorta is dilated with diameter 4.3 cm, EF 55%      Assessment/Plan    New onset atrial fibrillation RVR, rate controlled at rest, RVR with exertion  -Digoxin 125 mcg  -Plan to increase Diltiazem 90 mg Q6   -Asymptomatic  -Will proceed with MIA/DCCV in am if can tolerate OAC in the light of recent GIB    Recent admission for GI bleed ( requiring transfusion ) with known history of peptic ulcer disease, holding oral anticoagulation for now, plan to discuss with GI prior to DCCV & starting AC  -PPI  -awaiting GI recommendation for  OAC      Osteomyelitis (diagnosed 9/13/19, FX left pelvis after he tripped and fall)  -On Acef    History of rheumatoid arthritis  -Prednisone      Hypertension  -well controlled   -Losartan, Dilt    Bicuspid aortic valve   -will follow as out patient    Consider PT  Will follow        Please contact me with any questions.    MAURICIO Marsh.   Cardiologist, [facility]  [phone]

## 2019-11-07 NOTE — PROGRESS NOTES
Internal Medicine Interval Note  Note Author: Bernard Mcneill M.D.     Name Richard Hubbard     1975   Age/Sex 44 y.o. male   MRN [id number]   Code Status Full Code     After 5PM or if no immediate response to page, please call for cross-coverage  Attending/Team: Dr Metz/Radha See Patient List for primary contact information  Call [phone] to page    1st Call - Day Intern (R1):   Dr Mcneill 2nd Call - Day Sr. Resident (R2/R3):   Dr Molina         Reason for interval visit  (Principal Problem)   Atrial Fibrilation      Interval Problem Daily Status Update  (24 hours, problem oriented, brief subjective history, new lab/imaging data pertinent to that problem)   Mr Hubbard is a 45 y/o male presenting to the ED for palpitations.  Patient has a Past medical history of RA, and a recent pub ramus fracture.  Patient presented to the ED with palpitations, lightheadedness, and dizziness. He states that symptoms started early morning on , but did not seek medical attention till the afternoon.  Patient denies any history of A fib or similar symptoms in the past, but has a recorded Aortic root dilation.  In the ED patient was found on A Fib with a rate 160-170's and above symptoms.  He was seen by Cards in the ED, He received 2 pushes of IV Diltiazem and given oral Diltiazem, hs HR after intervention was in the 90's and was admitted to Telemetry Unit.  In the ED Trops were normal times 2;   CXR: No acute cardiopulmonary disease.  EKG: A FiB w/RVR rate 145  CTA was negative for PE  Echo from 2019 65% EF  This morning () when patient stood up his HR went in the 180's  Cardiology was Consulted, recommendations appreciated. No cardioversion for now recommended, Cards requested GI records for evaluation of bleed risk.  Echo today 116th:  EF 55%,   Moderate Aortic stenosis  Ascending aorta dilated 4.3 cm diameter     Review of Systems   Constitutional: Negative for chills and fever.   HENT:  Negative for congestion, nosebleeds and tinnitus.    Eyes: Negative for blurred vision, double vision and photophobia.   Respiratory: Negative for cough, hemoptysis and shortness of breath.    Cardiovascular: Positive for palpitations. Negative for chest pain, orthopnea, claudication and leg swelling.   Gastrointestinal: Negative for abdominal pain, heartburn, nausea and vomiting.   Genitourinary: Negative for dysuria and urgency.   Musculoskeletal: Negative for back pain, joint pain, myalgias and neck pain.   Skin: Negative for rash.   Neurological: Positive for dizziness and headaches. Negative for sensory change, speech change and focal weakness.   Endo/Heme/Allergies: Does not bruise/bleed easily.   Psychiatric/Behavioral: Negative for depression and suicidal ideas.       Disposition/Barriers to discharge:   None    Consultants/Specialty  Cardiology     PCP: MARTÍN Turner      Quality Measures  Quality-Core Measures   Reviewed items::  Labs reviewed, Medications reviewed, Radiology images reviewed and EKG reviewed  Rivera catheter::  No Rivera  DVT prophylaxis - mechanical:  SCDs  Ulcer Prophylaxis::  Yes          Physical Exam       Vitals:    11/06/19 0800 11/06/19 1156 11/06/19 1430 11/06/19 1600   BP: 104/74 112/81 119/84 122/83   Pulse: 95 76 (!) 134 100   Resp: 18 18  18   Temp: 36.2 °C (97.1 °F) 36.3 °C (97.4 °F)  36.4 °C (97.5 °F)   TempSrc: Temporal Temporal  Temporal   SpO2: 95% 95%  97%   Weight:       Height:         Body mass index is 32.9 kg/m². Weight: 107 kg (235 lb 14.3 oz)  Oxygen Therapy:  Pulse Oximetry: 97 %, O2 (LPM): 0, O2 Delivery: None (Room Air)    Physical Exam   Constitutional: He is oriented to person, place, and time and well-developed, well-nourished, and in no distress. No distress.   HENT:   Head: Normocephalic and atraumatic.   Mouth/Throat: Oropharynx is clear and moist. No oropharyngeal exudate.   Eyes: Pupils are equal, round, and reactive to light.  Conjunctivae and EOM are normal. No scleral icterus.   Neck: Normal range of motion. Neck supple. No JVD present. No tracheal deviation present.   Cardiovascular:   Murmur heard.  Irregularly irregular HR in the 120-140 during rounds  Holosystolic murmur    Pulmonary/Chest: Effort normal and breath sounds normal. No respiratory distress. He has no wheezes. He has no rales. He exhibits no tenderness.   Abdominal: Soft. Bowel sounds are normal. He exhibits no distension and no mass. There is no tenderness. There is no rebound and no guarding.   Musculoskeletal: Normal range of motion.         General: No edema.   Neurological: He is alert and oriented to person, place, and time. No cranial nerve deficit. GCS score is 15.   Skin: Skin is warm. He is not diaphoretic. No erythema.   Psychiatric: Mood, memory, affect and judgment normal.             Assessment/Plan     Atrial fibrillation with rapid ventricular response (HCC)  Assessment & Plan  -Patient presented with new onset atrial fibrillation with rapid ventricular response  -Received 2 IV pushes Diltiazem in the ED  -Cardiology was Consulted  -Overnight A fib Rate   - After getting up HR went to 180's  - Cardio Consulted this morning (11/6) again.  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                Plan:  -continue IV cefazolin   -continue    -Home pain management     Aortic root dilatation (HCC)- (present on admission)  Assessment & Plan  History of Aortic Root Dilation  ECHO today 11/6: Aorta  4.3 cm diameter       Plan:  Follow up outpatient  Monitor with Cards/PCP    Bleeding ulcer- (present on admission)  Assessment & Plan  History of PUD  Patient on Omeprazole            Plan:  - Continue Omeprazole  - Requesting GI records     Essential hypertension- (present on admission)  Assessment & Plan  -/89 on admission  -history of lower ext edema        Plan:  -Lasix  -Losartan    Rheumatoid arthritis (HCC)- (present on admission)  Assessment & Plan  -History of rheumatoid arthritis on chronic immunosuppression  -History of peptic ulcer disease with GI bleed in the past  -On prednisone at home     Plan:  -Continue prednisone  -Avoid NSAIDs

## 2019-11-07 NOTE — CARE PLAN
Problem: Pain Management  Goal: Pain level will decrease to patient's comfort goal  Outcome: PROGRESSING AS EXPECTED     Problem: Respiratory:  Goal: Respiratory status will improve  Outcome: PROGRESSING AS EXPECTED

## 2019-11-07 NOTE — DISCHARGE PLANNING
"SW met with patient for assessment. No visitors at this time.    Patient is a 44 year old  father of two children, \"Derby\" ages-4, and \"Philipp\" 18-months. Patient's spouse, \"Katiana\" is a APRN with [employer]. 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 Patient's mother is currently helping out with his children.    Patient has many doctors he see's. PCP is Dr. Fitch. Patient has Vascular physician, GI physician, and two physicians at [facility], pending a full knee replacement on R knee. Patient is independent, uses wheelchair primarily at home. Does have 2 walkers for more mobility if needed. Patient does not use O2. No other DME.     Patient denies any issues with AOD, or MH. No AD on file, patient is full code.     Patient reports he was in hospital in [facility], then went to [facility], then went home for 2-days, now back in hospital for palpitations. Patient reports he is tired, has not gotten good sleep in many weeks. Patient is hopeful he can go home soon. Patient talks fondly of his family, sharing stories of his children as a stay at home dad.     Patient reports no needs at this time. SW to continue to follow.     Care Transition Team Assessment    Information Source  Orientation : Oriented x 4  Information Given By: Patient  Informant's Name: Richard Hubbard  Who is responsible for making decisions for patient? : Patient    Readmission Evaluation  Is this a readmission?: Yes - unplanned readmission  Why do you think you were readmitted?: Palpatations    Elopement Risk  Legal Hold: No  Ambulatory or Self Mobile in Wheelchair: Yes  Disoriented: No  Psychiatric Symptoms: None  History of Wandering: No  Elopement this Admit: No  Vocalizing Wanting to Leave: No  Displays Behaviors, Body Language Wanting to Leave: No-Not at Risk for Elopement  Elopement Risk: Not at Risk for Elopement    Interdisciplinary Discharge Planning  Patient or legal guardian wants to designate a " caregiver (see row info): No    Discharge Preparedness  What is your plan after discharge?: Home with help  What are your discharge supports?: Child, Parent, Spouse  Prior Functional Level: Independent with Activities of Daily Living, Independent with Medication Management, Uses Cane, Uses Walker, Uses Wheelchair  Difficulity with ADLs: Walking  Difficulty with ADLs Comment: Uses walker/wheelchair  Difficulity with IADLs: None    Functional Assesment  Prior Functional Level: Independent with Activities of Daily Living, Independent with Medication Management, Uses Cane, Uses Walker, Uses Wheelchair    Finances  Financial Barriers to Discharge: No  Prescription Coverage: Yes    Vision / Hearing Impairment  Right Eye Vision: Impaired, Wears Glasses  Left Eye Vision: Impaired, Wears Glasses  Hearing Impairment : No         Advance Directive  Advance Directive?: None    Domestic Abuse  Have you ever been the victim of abuse or violence?: No  Physical Abuse or Sexual Abuse: No  Verbal Abuse or Emotional Abuse: No  Possible Abuse Reported to:: Not Applicable    Psychological Assessment  History of Substance Abuse: None  History of Psychiatric Problems: No  Non-compliant with Treatment: No  Newly Diagnosed Illness: No    Discharge Risks or Barriers  Discharge risks or barriers?: No  Patient risk factors: Readmission    Anticipated Discharge Information  Anticipated discharge disposition: DME, Home  Discharge Address: ([facility] [place] [address])  Discharge Contact Phone Number: ([phone])

## 2019-11-07 NOTE — ASSESSMENT & PLAN NOTE
History of PUD  Patient on Omeprazole  NO active bleeding or recent bleed  Stable H&H            Plan:  - Continue Omeprazole  - GI consulted, minimal GI risk bleed

## 2019-11-07 NOTE — PROGRESS NOTES
Internal Medicine Interval Note  Note Author: Bernard Mcneill M.D.     Name Richard Hubbard     1975   Age/Sex 44 y.o. male   MRN [id number]   Code Status Full Code     After 5PM or if no immediate response to page, please call for cross-coverage  Attending/Team: Dr Metz/Radha See Patient List for primary contact information  Call [phone] to page    1st Call - Day Intern (R1):   Dr Mcneill 2nd Call - Day Sr. Resident (R2/R3):   Dr Molina         Reason for interval visit  (Principal Problem)   New onset Atrial Fibrillation       Interval Problem Daily Status Update  (24 hours, problem oriented, brief subjective history, new lab/imaging data pertinent to that problem)   No acute events overnight reported by RN.  Patient states feeling better than at presentation.  Denies SOB, Chest Pain or Palpitations.  Patient alert and oriented times 4. He has been able to tolerate well oral intake.  GI was reached over the phone, Anticoagulation is not contraindicated at the moment since patient has not shown any signs of active bleeding and the risk of bleeding is minimal. He will be available for consultation if needed in the future.  Telemetry: AF rate 112-132, rare PVC's.  Anticoagulation and Cardioversion to be determined by Cardiology.      Review of Systems   Constitutional: Negative for chills and fever.   HENT: Negative for congestion, ear discharge, hearing loss, nosebleeds and tinnitus.    Eyes: Negative for blurred vision, double vision and photophobia.   Respiratory: Negative for cough, sputum production and shortness of breath.    Cardiovascular: Negative for chest pain, palpitations and orthopnea.   Gastrointestinal: Negative for abdominal pain, heartburn and nausea.   Genitourinary: Negative for dysuria, frequency, hematuria and urgency.   Musculoskeletal: Negative for joint pain and myalgias.   Skin: Negative for rash.   Neurological: Negative for dizziness and headaches.    Endo/Heme/Allergies: Does not bruise/bleed easily.   Psychiatric/Behavioral: Negative for depression and suicidal ideas.       Disposition/Barriers to discharge:   None    Consultants/Specialty  Cardiology  Gastroenterology    PCP: MARTÍN Turner      Quality Measures  Quality-Core Measures   Reviewed items::  Medications reviewed and Labs reviewed  Rivera catheter::  No Rivera  DVT prophylaxis - mechanical:  SCDs  Ulcer Prophylaxis::  Yes          Physical Exam       Vitals:    11/07/19 0002 11/07/19 0436 11/07/19 0800 11/07/19 1200   BP: 110/84 113/74 108/58    Pulse: (!) 110 (!) 105 89 94   Resp: 18 18 20 16   Temp: 36.6 °C (97.8 °F) 36.3 °C (97.3 °F) 36.7 °C (98.1 °F) 36.7 °C (98 °F)   TempSrc: Temporal Temporal Temporal Temporal   SpO2: 96% 97% 100% 96%   Weight:       Height:         Body mass index is 32.41 kg/m². Weight: 105.4 kg (232 lb 5.8 oz)  Oxygen Therapy:  Pulse Oximetry: 96 %, O2 (LPM): 0, O2 Delivery: None (Room Air)    Physical Exam   Constitutional: He is oriented to person, place, and time and well-developed, well-nourished, and in no distress. No distress.   HENT:   Head: Normocephalic and atraumatic.   Mouth/Throat: Oropharynx is clear and moist. No oropharyngeal exudate.   Eyes: Pupils are equal, round, and reactive to light. Conjunctivae and EOM are normal.   Neck: Normal range of motion. Neck supple. No JVD present.   Cardiovascular: Intact distal pulses.   Irregularly irregular heart rate   Pulmonary/Chest: Effort normal and breath sounds normal. No respiratory distress. He has no wheezes. He has no rales. He exhibits no tenderness.   Abdominal: Soft. Bowel sounds are normal. He exhibits no distension and no mass. There is no tenderness. There is no rebound and no guarding.   Musculoskeletal: Normal range of motion.         General: No edema.   Neurological: He is alert and oriented to person, place, and time. GCS score is 15.   Skin: Skin is warm. He is not diaphoretic.  No erythema.   Psychiatric: Mood, memory, affect and judgment normal.             Assessment/Plan     Atrial fibrillation with rapid ventricular response (HCC)  Assessment & Plan  -Patient presented with new onset atrial fibrillation with rapid ventricular response  -Received 2 IV pushes Diltiazem in the ED  -Cardiology was Consulted  -Overnight A fib Rate   - After getting up HR went to 180's  - Cardio on board  - Echo: 55% EF, Moderate Aortic dilation 4.3 cm diameter                 Plan:  - Anticoagulation and Cardioversion likely tomorrow 11/8  - Diltiazem   -Digoxin  -Lovenox    Osteomyelitis (HCC)- (present on admission)  Assessment & Plan  - Recently hospitalized at [facility] on 9/13 with pelvic pain , he underwent CT-guided drainage for 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                Plan:  -continue IV cefazolin   -continue    -Home pain management     Aortic root dilatation (HCC)- (present on admission)  Assessment & Plan  History of Aortic Root Dilation  ECHO today 11/6: Aorta 4.3 cm diameter       Plan:  Follow up outpatient  Monitor with Cards/PCP    Bleeding ulcer- (present on admission)  Assessment & Plan  History of PUD  Patient on Omeprazole  NO active bleeding or recent bleed  Stable H&H            Plan:  - Continue Omeprazole  - Requesting GI records     Essential hypertension- (present on admission)  Assessment & Plan  -/89 on admission  -history of lower ext edema   - Stable BP inpatient       Plan:  -Lasix  -Losartan    Rheumatoid arthritis (HCC)- (present on admission)  Assessment & Plan  -History of rheumatoid arthritis on chronic immunosuppression  -History of peptic ulcer disease with GI bleed in the past  -On prednisone at home     Plan:  -Continue prednisone  -Avoid NSAIDs

## 2019-11-07 NOTE — PROGRESS NOTES
Pt A&Ox4.  Denies chest pain and sob today.  Diltiazem and digoxin onboard.  Norco for pain.    Monitor Summary 12hrCC  A-Flutter  Hr 115-145  /.08/

## 2019-11-07 NOTE — ASSESSMENT & PLAN NOTE
History of Aortic Root Dilation  ECHO today 11/6: Aorta 4.3 cm diameter       Plan:  Follow up outpatient  Monitor with Cards/PCP

## 2019-11-07 NOTE — DISCHARGE PLANNING
Agency/Facility Name: [facility]  Spoke To: Admissions  Outcome: Per request sent renewed referral.

## 2019-11-07 NOTE — CARE PLAN
Problem: Communication  Goal: The ability to communicate needs accurately and effectively will improve  Outcome: PROGRESSING AS EXPECTED     Problem: Infection  Goal: Will remain free from infection  Outcome: PROGRESSING AS EXPECTED   Standard precautions in place. Hand hygiene completed before entering room and exiting room.

## 2019-11-08 ENCOUNTER — PATIENT OUTREACH (OUTPATIENT)
Dept: HEALTH INFORMATION MANAGEMENT | Facility: OTHER | Age: 44
End: 2019-11-08

## 2019-11-08 ENCOUNTER — APPOINTMENT (OUTPATIENT)
Dept: CARDIOLOGY | Facility: MEDICAL CENTER | Age: 44
DRG: 309 | End: 2019-11-08
Attending: NURSE PRACTITIONER
Payer: COMMERCIAL

## 2019-11-08 ENCOUNTER — APPOINTMENT (OUTPATIENT)
Dept: RADIOLOGY | Facility: MEDICAL CENTER | Age: 44
DRG: 309 | End: 2019-11-08
Attending: INTERNAL MEDICINE
Payer: COMMERCIAL

## 2019-11-08 LAB
ANION GAP SERPL CALC-SCNC: 8 MMOL/L (ref 0–11.9)
APTT PPP: 69.1 SEC (ref 24.7–36)
BASOPHILS # BLD AUTO: 1.2 % (ref 0–1.8)
BASOPHILS # BLD: 0.07 K/UL (ref 0–0.12)
BUN SERPL-MCNC: 23 MG/DL (ref 8–22)
CALCIUM SERPL-MCNC: 8.5 MG/DL (ref 8.5–10.5)
CHLORIDE SERPL-SCNC: 105 MMOL/L (ref 96–112)
CO2 SERPL-SCNC: 26 MMOL/L (ref 20–33)
CREAT SERPL-MCNC: 0.76 MG/DL (ref 0.5–1.4)
EKG IMPRESSION: NORMAL
EOSINOPHIL # BLD AUTO: 0.21 K/UL (ref 0–0.51)
EOSINOPHIL NFR BLD: 3.5 % (ref 0–6.9)
ERYTHROCYTE [DISTWIDTH] IN BLOOD BY AUTOMATED COUNT: 68.1 FL (ref 35.9–50)
GLUCOSE SERPL-MCNC: 69 MG/DL (ref 65–99)
HCT VFR BLD AUTO: 36.7 % (ref 42–52)
HGB BLD-MCNC: 11.2 G/DL (ref 14–18)
IMM GRANULOCYTES # BLD AUTO: 0.05 K/UL (ref 0–0.11)
IMM GRANULOCYTES NFR BLD AUTO: 0.8 % (ref 0–0.9)
LYMPHOCYTES # BLD AUTO: 2.55 K/UL (ref 1–4.8)
LYMPHOCYTES NFR BLD: 42.6 % (ref 22–41)
MCH RBC QN AUTO: 29.3 PG (ref 27–33)
MCHC RBC AUTO-ENTMCNC: 30.5 G/DL (ref 33.7–35.3)
MCV RBC AUTO: 96.1 FL (ref 81.4–97.8)
MONOCYTES # BLD AUTO: 0.64 K/UL (ref 0–0.85)
MONOCYTES NFR BLD AUTO: 10.7 % (ref 0–13.4)
NEUTROPHILS # BLD AUTO: 2.46 K/UL (ref 1.82–7.42)
NEUTROPHILS NFR BLD: 41.2 % (ref 44–72)
NRBC # BLD AUTO: 0 K/UL
NRBC BLD-RTO: 0 /100 WBC
PLATELET # BLD AUTO: 173 K/UL (ref 164–446)
PMV BLD AUTO: 9 FL (ref 9–12.9)
POTASSIUM SERPL-SCNC: 3.8 MMOL/L (ref 3.6–5.5)
RBC # BLD AUTO: 3.82 M/UL (ref 4.7–6.1)
SODIUM SERPL-SCNC: 139 MMOL/L (ref 135–145)
WBC # BLD AUTO: 6 K/UL (ref 4.8–10.8)

## 2019-11-08 PROCEDURE — 700102 HCHG RX REV CODE 250 W/ 637 OVERRIDE(OP): Performed by: STUDENT IN AN ORGANIZED HEALTH CARE EDUCATION/TRAINING PROGRAM

## 2019-11-08 PROCEDURE — 93325 DOPPLER ECHO COLOR FLOW MAPG: CPT | Mod: 26 | Performed by: INTERNAL MEDICINE

## 2019-11-08 PROCEDURE — 93010 ELECTROCARDIOGRAM REPORT: CPT | Mod: 59 | Performed by: INTERNAL MEDICINE

## 2019-11-08 PROCEDURE — 700111 HCHG RX REV CODE 636 W/ 250 OVERRIDE (IP)

## 2019-11-08 PROCEDURE — A9270 NON-COVERED ITEM OR SERVICE: HCPCS | Performed by: STUDENT IN AN ORGANIZED HEALTH CARE EDUCATION/TRAINING PROGRAM

## 2019-11-08 PROCEDURE — 99232 SBSQ HOSP IP/OBS MODERATE 35: CPT | Mod: 25 | Performed by: INTERNAL MEDICINE

## 2019-11-08 PROCEDURE — 93325 DOPPLER ECHO COLOR FLOW MAPG: CPT

## 2019-11-08 PROCEDURE — 92960 CARDIOVERSION ELECTRIC EXT: CPT

## 2019-11-08 PROCEDURE — 93005 ELECTROCARDIOGRAM TRACING: CPT | Performed by: INTERNAL MEDICINE

## 2019-11-08 PROCEDURE — A9502 TC99M TETROFOSMIN: HCPCS

## 2019-11-08 PROCEDURE — 770020 HCHG ROOM/CARE - TELE (206)

## 2019-11-08 PROCEDURE — 700102 HCHG RX REV CODE 250 W/ 637 OVERRIDE(OP): Performed by: HOSPITALIST

## 2019-11-08 PROCEDURE — A9270 NON-COVERED ITEM OR SERVICE: HCPCS | Performed by: INTERNAL MEDICINE

## 2019-11-08 PROCEDURE — 700102 HCHG RX REV CODE 250 W/ 637 OVERRIDE(OP): Performed by: INTERNAL MEDICINE

## 2019-11-08 PROCEDURE — 700111 HCHG RX REV CODE 636 W/ 250 OVERRIDE (IP): Performed by: STUDENT IN AN ORGANIZED HEALTH CARE EDUCATION/TRAINING PROGRAM

## 2019-11-08 PROCEDURE — 160002 HCHG RECOVERY MINUTES (STAT)

## 2019-11-08 PROCEDURE — 93312 ECHO TRANSESOPHAGEAL: CPT | Mod: 26 | Performed by: INTERNAL MEDICINE

## 2019-11-08 PROCEDURE — 5A2204Z RESTORATION OF CARDIAC RHYTHM, SINGLE: ICD-10-PCS | Performed by: INTERNAL MEDICINE

## 2019-11-08 PROCEDURE — 92960 CARDIOVERSION ELECTRIC EXT: CPT | Performed by: INTERNAL MEDICINE

## 2019-11-08 PROCEDURE — 700111 HCHG RX REV CODE 636 W/ 250 OVERRIDE (IP): Performed by: INTERNAL MEDICINE

## 2019-11-08 PROCEDURE — 85730 THROMBOPLASTIN TIME PARTIAL: CPT

## 2019-11-08 PROCEDURE — 99152 MOD SED SAME PHYS/QHP 5/>YRS: CPT | Performed by: INTERNAL MEDICINE

## 2019-11-08 PROCEDURE — A9270 NON-COVERED ITEM OR SERVICE: HCPCS | Performed by: HOSPITALIST

## 2019-11-08 PROCEDURE — B24BZZ4 ULTRASONOGRAPHY OF HEART WITH AORTA, TRANSESOPHAGEAL: ICD-10-PCS | Performed by: INTERNAL MEDICINE

## 2019-11-08 PROCEDURE — 99233 SBSQ HOSP IP/OBS HIGH 50: CPT | Mod: GC | Performed by: HOSPITALIST

## 2019-11-08 PROCEDURE — 700111 HCHG RX REV CODE 636 W/ 250 OVERRIDE (IP): Performed by: NURSE PRACTITIONER

## 2019-11-08 PROCEDURE — 85025 COMPLETE CBC W/AUTO DIFF WBC: CPT

## 2019-11-08 PROCEDURE — 80048 BASIC METABOLIC PNL TOTAL CA: CPT

## 2019-11-08 RX ORDER — REGADENOSON 0.08 MG/ML
INJECTION, SOLUTION INTRAVENOUS
Status: COMPLETED
Start: 2019-11-08 | End: 2019-11-08

## 2019-11-08 RX ORDER — MIDAZOLAM HYDROCHLORIDE 1 MG/ML
INJECTION INTRAMUSCULAR; INTRAVENOUS
Status: COMPLETED
Start: 2019-11-08 | End: 2019-11-08

## 2019-11-08 RX ORDER — FLECAINIDE ACETATE 50 MG/1
50 TABLET ORAL TWICE DAILY
Status: DISCONTINUED | OUTPATIENT
Start: 2019-11-08 | End: 2019-11-09

## 2019-11-08 RX ORDER — SODIUM CHLORIDE 9 MG/ML
500 INJECTION, SOLUTION INTRAVENOUS
Status: ACTIVE | OUTPATIENT
Start: 2019-11-08 | End: 2019-11-08

## 2019-11-08 RX ORDER — REGADENOSON 0.08 MG/ML
0.4 INJECTION, SOLUTION INTRAVENOUS ONCE
Status: COMPLETED | OUTPATIENT
Start: 2019-11-08 | End: 2019-11-08

## 2019-11-08 RX ORDER — DILTIAZEM HYDROCHLORIDE 240 MG/1
240 CAPSULE, COATED, EXTENDED RELEASE ORAL
Status: DISCONTINUED | OUTPATIENT
Start: 2019-11-08 | End: 2019-11-10 | Stop reason: HOSPADM

## 2019-11-08 RX ORDER — MIDAZOLAM HYDROCHLORIDE 1 MG/ML
1-5 INJECTION INTRAMUSCULAR; INTRAVENOUS PRN
Status: ACTIVE | OUTPATIENT
Start: 2019-11-08 | End: 2019-11-08

## 2019-11-08 RX ADMIN — MIDAZOLAM HYDROCHLORIDE 2 MG: 1 INJECTION, SOLUTION INTRAMUSCULAR; INTRAVENOUS at 09:14

## 2019-11-08 RX ADMIN — ACETAMINOPHEN 1000 MG: 500 TABLET ORAL at 05:06

## 2019-11-08 RX ADMIN — MIDAZOLAM HYDROCHLORIDE 1 MG: 1 INJECTION, SOLUTION INTRAMUSCULAR; INTRAVENOUS at 09:26

## 2019-11-08 RX ADMIN — DIGOXIN 125 MCG: 125 TABLET ORAL at 17:46

## 2019-11-08 RX ADMIN — FLECAINIDE ACETATE 50 MG: 50 TABLET ORAL at 20:32

## 2019-11-08 RX ADMIN — MORPHINE SULFATE 15 MG: 15 TABLET, EXTENDED RELEASE ORAL at 12:25

## 2019-11-08 RX ADMIN — HEPARIN SODIUM 1450 UNITS/HR: 5000 INJECTION, SOLUTION INTRAVENOUS at 23:20

## 2019-11-08 RX ADMIN — REGADENOSON 0.4 MG: 0.08 INJECTION, SOLUTION INTRAVENOUS at 14:09

## 2019-11-08 RX ADMIN — PREGABALIN 150 MG: 150 CAPSULE ORAL at 05:06

## 2019-11-08 RX ADMIN — NICOTINE 7 MG: 7 PATCH, EXTENDED RELEASE TRANSDERMAL at 05:04

## 2019-11-08 RX ADMIN — HEPARIN SODIUM 1450 UNITS/HR: 5000 INJECTION, SOLUTION INTRAVENOUS at 07:54

## 2019-11-08 RX ADMIN — CEFAZOLIN SODIUM 2 G: 2 INJECTION, SOLUTION INTRAVENOUS at 23:11

## 2019-11-08 RX ADMIN — FENTANYL CITRATE 25 MCG: 50 INJECTION INTRAMUSCULAR; INTRAVENOUS at 09:16

## 2019-11-08 RX ADMIN — HYDROCODONE BITARTRATE AND ACETAMINOPHEN 1 TABLET: 10; 325 TABLET ORAL at 15:29

## 2019-11-08 RX ADMIN — SENNOSIDES AND DOCUSATE SODIUM 2 TABLET: 8.6; 5 TABLET ORAL at 17:46

## 2019-11-08 RX ADMIN — PREDNISONE 10 MG: 10 TABLET ORAL at 05:06

## 2019-11-08 RX ADMIN — FENTANYL CITRATE 25 MCG: 50 INJECTION INTRAMUSCULAR; INTRAVENOUS at 09:08

## 2019-11-08 RX ADMIN — DILTIAZEM HYDROCHLORIDE 90 MG: 30 TABLET, FILM COATED ORAL at 00:09

## 2019-11-08 RX ADMIN — DILTIAZEM HYDROCHLORIDE 240 MG: 240 CAPSULE, COATED, EXTENDED RELEASE ORAL at 12:25

## 2019-11-08 RX ADMIN — MIDAZOLAM HYDROCHLORIDE 2 MG: 1 INJECTION, SOLUTION INTRAMUSCULAR; INTRAVENOUS at 09:06

## 2019-11-08 RX ADMIN — MORPHINE SULFATE 15 MG: 15 TABLET, EXTENDED RELEASE ORAL at 23:11

## 2019-11-08 RX ADMIN — FENTANYL CITRATE 50 MCG: 50 INJECTION INTRAMUSCULAR; INTRAVENOUS at 09:22

## 2019-11-08 RX ADMIN — SENNOSIDES AND DOCUSATE SODIUM 2 TABLET: 8.6; 5 TABLET ORAL at 05:06

## 2019-11-08 RX ADMIN — OMEPRAZOLE 40 MG: 20 CAPSULE, DELAYED RELEASE ORAL at 05:06

## 2019-11-08 RX ADMIN — ACETAMINOPHEN 1000 MG: 500 TABLET ORAL at 12:25

## 2019-11-08 RX ADMIN — PREGABALIN 150 MG: 150 CAPSULE ORAL at 12:25

## 2019-11-08 RX ADMIN — MIDAZOLAM HYDROCHLORIDE 1 MG: 1 INJECTION, SOLUTION INTRAMUSCULAR; INTRAVENOUS at 09:10

## 2019-11-08 RX ADMIN — MORPHINE SULFATE 15 MG: 15 TABLET, EXTENDED RELEASE ORAL at 05:06

## 2019-11-08 RX ADMIN — MIDAZOLAM HYDROCHLORIDE 1 MG: 1 INJECTION, SOLUTION INTRAMUSCULAR; INTRAVENOUS at 09:16

## 2019-11-08 RX ADMIN — PREGABALIN 150 MG: 150 CAPSULE ORAL at 17:45

## 2019-11-08 RX ADMIN — LOSARTAN POTASSIUM 25 MG: 25 TABLET ORAL at 10:38

## 2019-11-08 RX ADMIN — MIDAZOLAM HYDROCHLORIDE 2 MG: 1 INJECTION, SOLUTION INTRAMUSCULAR; INTRAVENOUS at 09:22

## 2019-11-08 RX ADMIN — ACETAMINOPHEN 1000 MG: 500 TABLET ORAL at 17:46

## 2019-11-08 RX ADMIN — HYDROCODONE BITARTRATE AND ACETAMINOPHEN 1 TABLET: 10; 325 TABLET ORAL at 23:25

## 2019-11-08 RX ADMIN — HYDROCODONE BITARTRATE AND ACETAMINOPHEN 1 TABLET: 10; 325 TABLET ORAL at 07:51

## 2019-11-08 RX ADMIN — FENTANYL CITRATE 50 MCG: 50 INJECTION INTRAMUSCULAR; INTRAVENOUS at 09:11

## 2019-11-08 RX ADMIN — CEFAZOLIN SODIUM 2 G: 2 INJECTION, SOLUTION INTRAVENOUS at 15:29

## 2019-11-08 RX ADMIN — FUROSEMIDE 20 MG: 20 TABLET ORAL at 20:32

## 2019-11-08 RX ADMIN — FUROSEMIDE 20 MG: 20 TABLET ORAL at 10:38

## 2019-11-08 RX ADMIN — CEFAZOLIN SODIUM 2 G: 2 INJECTION, SOLUTION INTRAVENOUS at 05:15

## 2019-11-08 ASSESSMENT — ENCOUNTER SYMPTOMS
PALPITATIONS: 0
COUGH: 0
CHOKING: 0
SORE THROAT: 0
HEADACHES: 0
VOMITING: 0
NAUSEA: 0
CHEST TIGHTNESS: 0
BLURRED VISION: 0
APNEA: 0
WHEEZING: 0
HEARTBURN: 0
FOCAL WEAKNESS: 0
DOUBLE VISION: 0
BLOOD IN STOOL: 0
SPEECH CHANGE: 0
DIZZINESS: 0
PHOTOPHOBIA: 0
SHORTNESS OF BREATH: 0
ABDOMINAL PAIN: 0
FEVER: 0
BRUISES/BLEEDS EASILY: 0
CHILLS: 0
STRIDOR: 0
DEPRESSION: 0

## 2019-11-08 NOTE — CARE PLAN
Problem: Nutritional:  Goal: Achieve adequate nutritional intake  Description  Patient will consume >50% of meals   Outcome: PROGRESSING SLOWER THAN EXPECTED     Attempted to see pt x 2, out of room (nuclear medicine). PO % x 1 meal documented. RD will continue to follow for sufficient intake.

## 2019-11-08 NOTE — PROCEDURES
MIA was performed and did not show any LA/DELORES thrombus.  Formal MIA note to follow.     We then proceeded with the cardioversion.   After adequate sedation, 200J of synchronized cardioversion was attempted, resulting in sinus rhythm.     Continue current medication regimen.     Complications: none     Conscious sedation was administered.  8 mg of Versed, 150 mcg of fentanyl.  Start time: 9:06am   Stop time: 9:26pm  Please refer to the media tab for details on sedation given.     I personally supervised the administration of moderate sedation by the RN and observed the level of consciousness and physiologic status throughout the procedure.     Lourdes Alarcon MD  Cardiologist  [facility]

## 2019-11-08 NOTE — OR NURSING
0941: Patient from cath lab to PPU via [facility] s/p MIA/cardioversion X1. Patient is awake. VSS. Patient's respiration spontaneous and non-labored. Will monitor closely. Vital signs per MD order. Heparin gtt infusing at 29 ml/hr.  1000: Patient is wide awake. Patient tolerated PO fluid well. EKG done at bedside.   1010: VSS. Patient met criteria to transfer back to [facility]. Report to Naomi CUEVAS.

## 2019-11-08 NOTE — PROGRESS NOTES
"Pt returned to room, ambulated to bed with FWW and SBA-NAD-pt made comfortable with blanket and positioning-telemetry applied and monitor room notified-sinus rhythm now noted on monitor-pt reports some discomfort to mid upper chest area from \"shock\" while being cardioverted-warm pack applied and pt states discomfort is tolerable.  "

## 2019-11-08 NOTE — PROGRESS NOTES
Cardiology Follow Up Progress Note    Date of Service  11/8/2019    Attending Physician  Joaquin Metz M.D.      Admitted for sudden & acute onset of palpitations associated with dyspnea and dizziness, found to be in A. fib RVR, rate 150      Cardiology consultation for new onset A. fib RVR      History of: No prior cardiac history, known rheumatoid arthritis on Humira, hospitalized for coffee-ground emesis August 2019 and again melena in September, 2019 (hemoglobin was found to be 7 s/p blood transfusion), history of peptic ulcer disease status post cauterization, October 2019 found to have left pubic ramus fracture after a mechanical fall of Audust ( 2019 ) was diagnosed with osteomyelitis and underwent surgical debridement with antibiotics, quit alcohol intake 3 to 4 years ago    Interim Events    11/8/19  No overnight cardiac events, underwent successful MIA/DCCV this am, currently in SR, NPO for MPI, discussed with family at bedside, questions regarding MP and AA were answered .      11/7/19 remains in afib RVR with exertion, HR at rest 80's, D/W Dr Alarcon uptitrate Dilt for better rate control, frustrated with being in the hospital, needs R knee surgery at some point    Review of Systems  Review of Systems   Respiratory: Negative for apnea, cough, choking, chest tightness, shortness of breath, wheezing and stridor.    Cardiovascular: Negative for chest pain and leg swelling.       Vital signs in last 24 hours  Temp:  [36.2 °C (97.2 °F)-36.9 °C (98.5 °F)] 36.2 °C (97.2 °F)  Pulse:  [] 80  Resp:  [16-20] 16  BP: ()/(53-73) 102/55  SpO2:  [91 %-100 %] 94 %    Physical Exam  Physical Exam  HENT:      Head: Normocephalic.      Mouth/Throat:      Mouth: Mucous membranes are moist.   Cardiovascular:      Rate and Rhythm: Tachycardia present. Rhythm irregular.      Heart sounds: Murmur present.   Pulmonary:      Effort: Pulmonary effort is normal.   Abdominal:      General: Abdomen is flat.   Skin:      General: Skin is warm.   Neurological:      Mental Status: He is alert and oriented to person, place, and time.         Lab Review  Lab Results   Component Value Date/Time    WBC 6.0 2019 04:00 AM    WBC 8.4 10/19/2010 12:05 PM    RBC 3.82 (L) 2019 04:00 AM    RBC 4.96 10/19/2010 12:05 PM    HEMOGLOBIN 11.2 (L) 2019 04:00 AM    HEMATOCRIT 36.7 (L) 2019 04:00 AM    MCV 96.1 2019 04:00 AM    MCV 85 10/19/2010 12:05 PM    MCH 29.3 2019 04:00 AM    MCH 28.4 10/19/2010 12:05 PM    MCHC 30.5 (L) 2019 04:00 AM    MPV 9.0 2019 04:00 AM      Lab Results   Component Value Date/Time    SODIUM 139 2019 04:00 AM    POTASSIUM 3.8 2019 04:00 AM    CHLORIDE 105 2019 04:00 AM    CO2 26 2019 04:00 AM    GLUCOSE 69 2019 04:00 AM    BUN 23 (H) 2019 04:00 AM    CREATININE 0.76 2019 04:00 AM    CREATININE 0.95 10/19/2010 12:05 PM    BUNCREATRAT 9 10/19/2010 12:05 PM    GLOMRATE >59 10/19/2010 12:05 PM      Lab Results   Component Value Date/Time    ASTSGOT 72 (H) 2019 03:15 AM    ALTSGPT 24 2019 03:15 AM     Lab Results   Component Value Date/Time    CHOLSTRLTOT 114 2019 03:11 PM    LDL 37 2019 03:11 PM    HDL 64 2019 03:11 PM    TRIGLYCERIDE 64 2019 03:11 PM    TROPONINT 13 2019 11:17 PM       No results for input(s): NTPROBNP in the last 72 hours.    Cardiac Imaging and Procedures Review      EK19, A. fib, rate 145    Echocardiogram: 19, normal LV systolic function, moderate aortic stenosis, peak 40 mmHg, mean 23 mmHg, suspect bicuspid aortic valve, ascending aorta is dilated with diameter 4.3 cm, EF 55%      Assessment/Plan    New onset atrial fibrillation RVR, rate controlled at rest, RVR with exertion  -s/p successful MIA/DCCV 19  -IN SR  -Digoxin 125 mcg, Diltiazem 240 mg  -IV Heparin  -Plan for MPI today, if negative will start AA meds in the form of Flecainide   -well preserved  EF, 55%      Osteomyelitis (diagnosed 9/13/19, FX left pelvis after he tripped and fall)  -On Acef    History of rheumatoid arthritis  -Prednisone      Hypertension  -well controlled   -Losartan, Dilt    Bicuspid aortic valve   -will follow up as an out patient    Consider PT  Will follow        Please contact me with any questions.    MAURICIO Marsh.   Cardiologist, [facility]  [phone]

## 2019-11-08 NOTE — PROGRESS NOTES
Pt to cardioversion via gurney with transport and ACLS RN-pt remains NPO, alert and oriented x 4-medicated for pain and states relief-pt in atrial fibrillation with rate of 74.

## 2019-11-08 NOTE — PROGRESS NOTES
Per Cardiology request, we requested medical records from Mr Hubbard' Gastroenterologist.  Dr Danny Durbin was contacted by [facility] team, to discuss bleeding risks, since Mr Birmingham was Diagnosed with PUD back in 2009, patient developed PUD likely due to chronic use of OTC NSAID's.  Patient was seen and had a Upper Endoscopy few months ago. Patient has not had any recent bleed, he is not currently bleeding, he has not shown any signs or symptoms of bleeding. No history of recent melena or hematochezia, H&H has been stable.  Per GI his risk of bleeding is minimal with Anticoagulation.  Dr Durbin stated that will be available for further Consultation if necessary.

## 2019-11-08 NOTE — PROGRESS NOTES
Pt returned [facility] for generalized pain-lunch served.

## 2019-11-08 NOTE — PROGRESS NOTES
Internal Medicine Interval Note  Note Author: Bernard Mcneill M.D.     Name Richard Hubbard     1975   Age/Sex 44 y.o. male   MRN [id number]   Code Status Full Code     After 5PM or if no immediate response to page, please call for cross-coverage  Attending/Team: Dr Metz/Radha See Patient List for primary contact information  Call [phone] to page    1st Call - Day Intern (R1):   Dr Mcneill 2nd Call - Day Sr. Resident (R2/R3):   Dr Molina         Reason for interval visit  (Principal Problem)   New Onset Atrial Fibrillation with RVR      Interval Problem Daily Status Update  (24 hours, problem oriented, brief subjective history, new lab/imaging data pertinent to that problem)   No acute events overnight reported.  Patient has been asymptomatic, denies SOB, Chest pain or palpitations.  Patient has been on AF since admission. Telemetry AF rates 84-98 rare PVC's.  GI was contacted, Cardioversion not contraindicated unless active bleeding, patient has been stable no active bleed, no signs of bleeding recently either, H&H stable, minimal bleeding risk.  MIA No thrombi.  Patient Cardioverted under sedation 200J, Sinus Rhythm.  He will undergo Chemical Stress Test per Cardiology this evening.  Patient alert and oriented, morning BP in the soft side, patient asymptomatic, after cardioversion patient stating chest discomfort, patient is told that pain may last couple to few days.      Review of Systems   Constitutional: Negative for chills and fever.   HENT: Negative for congestion, nosebleeds, sore throat and tinnitus.    Eyes: Negative for blurred vision, double vision and photophobia.   Respiratory: Negative for cough and shortness of breath.    Cardiovascular: Negative for chest pain and palpitations.   Gastrointestinal: Negative for abdominal pain, blood in stool, heartburn, melena, nausea and vomiting.   Genitourinary: Negative for dysuria, hematuria and urgency.   Skin: Negative for itching  and rash.   Neurological: Negative for dizziness, speech change, focal weakness and headaches.   Endo/Heme/Allergies: Does not bruise/bleed easily.   Psychiatric/Behavioral: Negative for depression and suicidal ideas.       Disposition/Barriers to discharge:   None    Consultants/Specialty  Cardiology  Gastroenterology    PCP: MARTÍN Turner      Quality Measures  Quality-Core Measures   Reviewed items::  EKG reviewed, Labs reviewed and Medications reviewed  Rivera catheter::  No Rivera  DVT prophylaxis pharmacological::  Heparin  DVT prophylaxis - mechanical:  SCDs  Ulcer Prophylaxis::  Yes          Physical Exam       Vitals:    11/08/19 0946 11/08/19 0955 11/08/19 1252 11/08/19 1615   BP: 100/72 102/55 107/68 116/69   Pulse: 99 80 68 71   Resp: 20 16 19 19   Temp:  36.2 °C (97.2 °F) 37.1 °C (98.7 °F) 36.2 °C (97.1 °F)   TempSrc:   Temporal Temporal   SpO2: 95% 94% 99% 100%   Weight:       Height:         Body mass index is 33.36 kg/m². Weight: 108.5 kg (239 lb 3.2 oz)  Oxygen Therapy:  Pulse Oximetry: 100 %, O2 (LPM): 2, O2 Delivery: Nasal Cannula    Physical Exam   Constitutional: He is oriented to person, place, and time and well-developed, well-nourished, and in no distress. No distress.   HENT:   Head: Normocephalic and atraumatic.   Mouth/Throat: Oropharynx is clear and moist. No oropharyngeal exudate.   Eyes: Pupils are equal, round, and reactive to light. Conjunctivae and EOM are normal. No scleral icterus.   Neck: Normal range of motion. Neck supple. No JVD present.   Cardiovascular: Intact distal pulses.   Irregularly irregular heart rate   Pulmonary/Chest: Effort normal and breath sounds normal. No respiratory distress. He has no wheezes. He has no rales. He exhibits no tenderness.   Abdominal: Soft. Bowel sounds are normal. He exhibits no distension. There is no tenderness. There is no rebound and no guarding.   Musculoskeletal: Normal range of motion.         General: No edema.    Neurological: He is alert and oriented to person, place, and time. No cranial nerve deficit. GCS score is 15.   Skin: Skin is warm. He is not diaphoretic. No erythema.   Psychiatric: Mood, memory, affect and judgment normal.             Assessment/Plan     Atrial fibrillation with rapid ventricular response (HCC)  Assessment & Plan  -Patient presented with new onset atrial fibrillation with rapid ventricular response  -Received 2 IV pushes Diltiazem in the ED  -Cardiology was Consulted  -Overnight A fib Rate   - After getting up HR went to 180's  - Cardio on board  - Echo: 55% EF, Moderate Aortic dilation 4.3 cm diameter   -MIA No atrial Thrombi seen  - Cardioverted, now Sinus Rhythm                Plan:  - Chemical stress test this evening (11/8)  - Diltiazem   - Digoxin  - Lovenox    Osteomyelitis (HCC)- (present on admission)  Assessment & Plan  - Recently hospitalized at [facility] on 9/13 with pelvic pain , he underwent CT-guided drainage for 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                Plan:  -continue IV cefazolin   -continue    -Home pain management     Aortic root dilatation (HCC)- (present on admission)  Assessment & Plan  History of Aortic Root Dilation  ECHO today 11/6: Aorta 4.3 cm diameter       Plan:  Follow up outpatient  Monitor with Cards/PCP    Bleeding ulcer- (present on admission)  Assessment & Plan  History of PUD  Patient on Omeprazole  NO active bleeding or recent bleed  Stable H&H            Plan:  - Continue Omeprazole  - GI consulted, minimal GI risk bleed    Essential hypertension- (present on admission)  Assessment & Plan  -/89 on admission  -history of lower ext edema   - Soft Morning BP, patient asymptomatic       Plan:  -Lasix  -Losartan    Rheumatoid arthritis (HCC)- (present  on admission)  Assessment & Plan  -History of rheumatoid arthritis on chronic immunosuppression  -History of peptic ulcer disease with GI bleed in the past  -On prednisone at home     Plan:  -Continue prednisone  -Avoid NSAIDs

## 2019-11-08 NOTE — CARE PLAN
Problem: Pain Management  Goal: Pain level will decrease to patient's comfort goal  Outcome: PROGRESSING AS EXPECTED  Note:   Pain being managed with scheduled and prn medications.     Problem: Respiratory:  Goal: Respiratory status will improve  Outcome: PROGRESSING AS EXPECTED  Note:   Pt VSS, on RA and BBS clear and equal.     Problem: Communication  Goal: The ability to communicate needs accurately and effectively will improve  Outcome: PROGRESSING AS EXPECTED  Note:   Pt is a/ox4 and can express needs, concerns, and pain/comfort level to staff when prompted.  Pt demonstrated proper use of call bell and agrees to call for assistance when needed.     Problem: Safety  Goal: Will remain free from injury  Outcome: PROGRESSING AS EXPECTED  Note:   Frequent rounding performed to ensure safety.  During rounds skin and IV sites assessed for complications or breakdown.  Personal items and call bell with in reach.  Room well lit and clutter free.  Goal: Will remain free from falls  Outcome: PROGRESSING AS EXPECTED  Note:   Fall precautions in place with exception to bed alarm.  Pt agrees to call for assistance and does not try to get up out of bed without assistance due to pain.       Problem: Knowledge Deficit  Goal: Knowledge of disease process/condition, treatment plan, diagnostic tests, and medications will improve  Outcome: PROGRESSING AS EXPECTED  Note:   Educated pt on cardioversion techniques and what to expect in morning before, during and after procedure.

## 2019-11-09 LAB
ALBUMIN SERPL BCP-MCNC: 3.3 G/DL (ref 3.2–4.9)
ALBUMIN/GLOB SERPL: 1.4 G/DL
ALP SERPL-CCNC: 68 U/L (ref 30–99)
ALT SERPL-CCNC: 9 U/L (ref 2–50)
ANION GAP SERPL CALC-SCNC: 8 MMOL/L (ref 0–11.9)
APTT PPP: 59 SEC (ref 24.7–36)
AST SERPL-CCNC: 17 U/L (ref 12–45)
BILIRUB SERPL-MCNC: 0.2 MG/DL (ref 0.1–1.5)
BUN SERPL-MCNC: 18 MG/DL (ref 8–22)
CALCIUM SERPL-MCNC: 8.3 MG/DL (ref 8.5–10.5)
CHLORIDE SERPL-SCNC: 107 MMOL/L (ref 96–112)
CO2 SERPL-SCNC: 23 MMOL/L (ref 20–33)
CREAT SERPL-MCNC: 0.77 MG/DL (ref 0.5–1.4)
GLOBULIN SER CALC-MCNC: 2.4 G/DL (ref 1.9–3.5)
GLUCOSE SERPL-MCNC: 80 MG/DL (ref 65–99)
LV EJECT FRACT  99904: 65
POTASSIUM SERPL-SCNC: 4.2 MMOL/L (ref 3.6–5.5)
PROT SERPL-MCNC: 5.7 G/DL (ref 6–8.2)
SODIUM SERPL-SCNC: 138 MMOL/L (ref 135–145)

## 2019-11-09 PROCEDURE — 80053 COMPREHEN METABOLIC PANEL: CPT

## 2019-11-09 PROCEDURE — 700102 HCHG RX REV CODE 250 W/ 637 OVERRIDE(OP): Performed by: HOSPITALIST

## 2019-11-09 PROCEDURE — 99232 SBSQ HOSP IP/OBS MODERATE 35: CPT | Mod: GC | Performed by: HOSPITALIST

## 2019-11-09 PROCEDURE — 700111 HCHG RX REV CODE 636 W/ 250 OVERRIDE (IP): Performed by: STUDENT IN AN ORGANIZED HEALTH CARE EDUCATION/TRAINING PROGRAM

## 2019-11-09 PROCEDURE — A9270 NON-COVERED ITEM OR SERVICE: HCPCS | Performed by: STUDENT IN AN ORGANIZED HEALTH CARE EDUCATION/TRAINING PROGRAM

## 2019-11-09 PROCEDURE — A9270 NON-COVERED ITEM OR SERVICE: HCPCS | Performed by: NURSE PRACTITIONER

## 2019-11-09 PROCEDURE — 700102 HCHG RX REV CODE 250 W/ 637 OVERRIDE(OP): Performed by: STUDENT IN AN ORGANIZED HEALTH CARE EDUCATION/TRAINING PROGRAM

## 2019-11-09 PROCEDURE — A9270 NON-COVERED ITEM OR SERVICE: HCPCS | Performed by: INTERNAL MEDICINE

## 2019-11-09 PROCEDURE — 700102 HCHG RX REV CODE 250 W/ 637 OVERRIDE(OP): Performed by: INTERNAL MEDICINE

## 2019-11-09 PROCEDURE — 700102 HCHG RX REV CODE 250 W/ 637 OVERRIDE(OP): Performed by: NURSE PRACTITIONER

## 2019-11-09 PROCEDURE — A9270 NON-COVERED ITEM OR SERVICE: HCPCS | Performed by: HOSPITALIST

## 2019-11-09 PROCEDURE — 99232 SBSQ HOSP IP/OBS MODERATE 35: CPT | Performed by: INTERNAL MEDICINE

## 2019-11-09 PROCEDURE — 85730 THROMBOPLASTIN TIME PARTIAL: CPT

## 2019-11-09 PROCEDURE — 770020 HCHG ROOM/CARE - TELE (206)

## 2019-11-09 RX ORDER — HEPARIN SODIUM 1000 [USP'U]/ML
3800 INJECTION, SOLUTION INTRAVENOUS; SUBCUTANEOUS PRN
Status: ACTIVE | OUTPATIENT
Start: 2019-11-09 | End: 2019-11-09

## 2019-11-09 RX ORDER — HYDROCODONE BITARTRATE AND ACETAMINOPHEN 10; 325 MG/1; MG/1
1 TABLET ORAL EVERY 6 HOURS PRN
Status: DISCONTINUED | OUTPATIENT
Start: 2019-11-09 | End: 2019-11-10 | Stop reason: HOSPADM

## 2019-11-09 RX ORDER — HEPARIN SODIUM 5000 [USP'U]/100ML
INJECTION, SOLUTION INTRAVENOUS CONTINUOUS
Status: ACTIVE | OUTPATIENT
Start: 2019-11-09 | End: 2019-11-09

## 2019-11-09 RX ADMIN — SENNOSIDES AND DOCUSATE SODIUM 2 TABLET: 8.6; 5 TABLET ORAL at 05:57

## 2019-11-09 RX ADMIN — ACETAMINOPHEN 500 MG: 500 TABLET ORAL at 05:58

## 2019-11-09 RX ADMIN — HYDROCODONE BITARTRATE AND ACETAMINOPHEN 1 TABLET: 10; 325 TABLET ORAL at 14:26

## 2019-11-09 RX ADMIN — CEFAZOLIN SODIUM 2 G: 2 INJECTION, SOLUTION INTRAVENOUS at 06:09

## 2019-11-09 RX ADMIN — PREDNISONE 10 MG: 10 TABLET ORAL at 05:58

## 2019-11-09 RX ADMIN — MORPHINE SULFATE 15 MG: 15 TABLET, EXTENDED RELEASE ORAL at 05:58

## 2019-11-09 RX ADMIN — MORPHINE SULFATE 15 MG: 15 TABLET, EXTENDED RELEASE ORAL at 21:25

## 2019-11-09 RX ADMIN — LOSARTAN POTASSIUM 25 MG: 25 TABLET ORAL at 09:31

## 2019-11-09 RX ADMIN — MORPHINE SULFATE 15 MG: 15 TABLET, EXTENDED RELEASE ORAL at 14:22

## 2019-11-09 RX ADMIN — DILTIAZEM HYDROCHLORIDE 240 MG: 240 CAPSULE, COATED, EXTENDED RELEASE ORAL at 05:58

## 2019-11-09 RX ADMIN — DIGOXIN 125 MCG: 125 TABLET ORAL at 17:37

## 2019-11-09 RX ADMIN — CEFAZOLIN SODIUM 2 G: 2 INJECTION, SOLUTION INTRAVENOUS at 15:23

## 2019-11-09 RX ADMIN — CEFAZOLIN SODIUM 2 G: 2 INJECTION, SOLUTION INTRAVENOUS at 21:25

## 2019-11-09 RX ADMIN — PREGABALIN 150 MG: 150 CAPSULE ORAL at 05:57

## 2019-11-09 RX ADMIN — FLECAINIDE ACETATE 50 MG: 50 TABLET ORAL at 06:05

## 2019-11-09 RX ADMIN — FUROSEMIDE 20 MG: 20 TABLET ORAL at 21:25

## 2019-11-09 RX ADMIN — HYDROCODONE BITARTRATE AND ACETAMINOPHEN 1 TABLET: 10; 325 TABLET ORAL at 07:53

## 2019-11-09 RX ADMIN — RIVAROXABAN 20 MG: 20 TABLET, FILM COATED ORAL at 17:37

## 2019-11-09 RX ADMIN — PREGABALIN 150 MG: 150 CAPSULE ORAL at 12:43

## 2019-11-09 RX ADMIN — FUROSEMIDE 20 MG: 20 TABLET ORAL at 09:31

## 2019-11-09 RX ADMIN — PREGABALIN 150 MG: 150 CAPSULE ORAL at 17:37

## 2019-11-09 RX ADMIN — OMEPRAZOLE 40 MG: 20 CAPSULE, DELAYED RELEASE ORAL at 05:58

## 2019-11-09 RX ADMIN — HYDROCODONE BITARTRATE AND ACETAMINOPHEN 1 TABLET: 10; 325 TABLET ORAL at 21:25

## 2019-11-09 RX ADMIN — NICOTINE 7 MG: 7 PATCH, EXTENDED RELEASE TRANSDERMAL at 05:57

## 2019-11-09 ASSESSMENT — ENCOUNTER SYMPTOMS
SORE THROAT: 0
SHORTNESS OF BREATH: 0
WHEEZING: 0
APNEA: 0
PHOTOPHOBIA: 0
ABDOMINAL PAIN: 0
HEADACHES: 0
STRIDOR: 0
DEPRESSION: 0
MYALGIAS: 0
CHEST TIGHTNESS: 0
COUGH: 0
NAUSEA: 0
DOUBLE VISION: 0
BACK PAIN: 0
BLURRED VISION: 0
CHILLS: 0
FALLS: 0
BRUISES/BLEEDS EASILY: 0
ORTHOPNEA: 0
VOMITING: 0
CONSTIPATION: 0
PALPITATIONS: 0
HEARTBURN: 0
DIZZINESS: 0
FEVER: 0
CHOKING: 0

## 2019-11-09 ASSESSMENT — CHA2DS2 SCORE
HYPERTENSION: YES
CHF OR LEFT VENTRICULAR DYSFUNCTION: NO
SEX: MALE
DIABETES: NO
PRIOR STROKE OR TIA OR THROMBOEMBOLISM: NO
AGE 75 OR GREATER: NO
AGE 65 TO 74: NO
CHA2DS2 VASC SCORE: 1
VASCULAR DISEASE: NO

## 2019-11-09 NOTE — PROGRESS NOTES
Cardiology Follow Up Progress Note    Date of Service  11/9/2019    Attending Physician  Joaquin Metz M.D.      Admitted for sudden & acute onset of palpitations associated with dyspnea and dizziness, found to be in A. fib RVR, rate 150      Cardiology consultation for new onset A. fib RVR      History of: No prior cardiac history, known rheumatoid arthritis on Humira, hospitalized for coffee-ground emesis August 2019 and again melena in September, 2019 (hemoglobin was found to be 7 s/p blood transfusion), history of peptic ulcer disease status post cauterization, October 2019 found to have left pubic ramus fracture after a mechanical fall of Audust ( 2019 ) was diagnosed with osteomyelitis and underwent surgical debridement with antibiotics, quit alcohol intake 3 to 4 years ago      Labs reviewed  Sodium, potassium, creatinine stable      Blood pressure  114/76  Heart rate  Sinus rhythm    Interim Events    11/19/19 no overnight cardiac events, maintaining SR, denies  symptoms     11/8/19  No overnight cardiac events, underwent successful MIA/DCCV this am, currently in SR, NPO for MPI, discussed with family at bedside, questions regarding MP and AA were answered .      11/7/19 remains in afib RVR with exertion, HR at rest 80's, D/W Dr Agnes romerotitcandete [place] for better rate control, frustrated with being in the hospital, needs R knee surgery at some point    Review of Systems  Review of Systems   Respiratory: Negative for apnea, cough, choking, chest tightness, shortness of breath, wheezing and stridor.    Cardiovascular: Negative for chest pain and leg swelling.       Vital signs in last 24 hours  Temp:  [36.2 °C (97.1 °F)-37.1 °C (98.7 °F)] 36.7 °C (98.1 °F)  Pulse:  [] 75  Resp:  [16-20] 16  BP: ()/(55-80) 114/76  SpO2:  [91 %-100 %] 98 %    Physical Exam  Physical Exam  HENT:      Head: Normocephalic.      Mouth/Throat:      Mouth: Mucous membranes are moist.   Cardiovascular:      Rate and  Rhythm: Normal rate and regular rhythm.      Heart sounds: Murmur present.   Pulmonary:      Effort: Pulmonary effort is normal.   Abdominal:      General: Abdomen is flat.   Skin:     General: Skin is warm.   Neurological:      Mental Status: He is alert and oriented to person, place, and time.         Lab Review  Lab Results   Component Value Date/Time    WBC 6.0 2019 04:00 AM    WBC 8.4 10/19/2010 12:05 PM    RBC 3.82 (L) 2019 04:00 AM    RBC 4.96 10/19/2010 12:05 PM    HEMOGLOBIN 11.2 (L) 2019 04:00 AM    HEMATOCRIT 36.7 (L) 2019 04:00 AM    MCV 96.1 2019 04:00 AM    MCV 85 10/19/2010 12:05 PM    MCH 29.3 2019 04:00 AM    MCH 28.4 10/19/2010 12:05 PM    MCHC 30.5 (L) 2019 04:00 AM    MPV 9.0 2019 04:00 AM      Lab Results   Component Value Date/Time    SODIUM 138 2019 04:59 AM    POTASSIUM 4.2 2019 04:59 AM    CHLORIDE 107 2019 04:59 AM    CO2 23 2019 04:59 AM    GLUCOSE 80 2019 04:59 AM    BUN 18 2019 04:59 AM    CREATININE 0.77 2019 04:59 AM    CREATININE 0.95 10/19/2010 12:05 PM    BUNCREATRAT 9 10/19/2010 12:05 PM    GLOMRATE >59 10/19/2010 12:05 PM      Lab Results   Component Value Date/Time    ASTSGOT 17 2019 04:59 AM    ALTSGPT 9 2019 04:59 AM     Lab Results   Component Value Date/Time    CHOLSTRLTOT 114 2019 03:11 PM    LDL 37 2019 03:11 PM    HDL 64 2019 03:11 PM    TRIGLYCERIDE 64 2019 03:11 PM    TROPONINT 13 2019 11:17 PM       No results for input(s): NTPROBNP in the last 72 hours.    Cardiac Imaging and Procedures Review      EK19, A. fib, rate 145    Echocardiogram: 19, normal LV systolic function, moderate aortic stenosis, peak 40 mmHg, mean 23 mmHg, suspect bicuspid aortic valve, ascending aorta is dilated with diameter 4.3 cm, EF 55%      Assessment/Plan    New onset atrial fibrillation RVR, rate controlled at rest, RVR with exertion  -s/p successful  MIA/DCCV 11/8/19  -IN SR  -Digoxin 125 mcg, Diltiazem 240 mg  -IV Heparin, will switch to Xarelto, discussed with  to check for availability and affordability prior to discharge.  -MPI 11/8/19, revealed large inferior wall defect, EF 35%, negative for ischemia, discussed with  on am rounds, result of the MPI appears to be mostly artifact as oppose to an infarct, patient to follow-up with EP as an outpatient for evaluation of ablation , will arrange follow-up appointment.  -well preserved EF, 55%      Osteomyelitis (diagnosed 9/13/19, FX left pelvis after he tripped and fall)  -On Acef    History of rheumatoid arthritis  -Prednisone      Hypertension  -well controlled   -Losartan, Dilt    Bicuspid aortic valve   -stable  -will follow up as an out patient    Consider PT  Cardiology will sign off  Please do not hesitate to contact us with any questions        Please contact me with any questions.    ARASH Marsh   Cardiologist, [facility]  [phone]

## 2019-11-09 NOTE — PROGRESS NOTES
Patient ambulated to door and back to hospital bed. Dyspnea on exertion, patient tolerated room air with O2 sat between 94-95. Sustained SR .

## 2019-11-09 NOTE — PROGRESS NOTES
Received bedside report on patient. Fall risk assessed and fall precautions set in place. Patient educated on importance of call light. Call light in place. Patient verbalized understanding.

## 2019-11-09 NOTE — PROGRESS NOTES
Internal Medicine Interval Note  Note Author: Bernard Mcneill M.D.     Name Richard Hubbard     1975   Age/Sex 44 y.o. male   MRN [id number]   Code Status Full Code     After 5PM or if no immediate response to page, please call for cross-coverage  Attending/Team: Dr Metz/Radha See Patient List for primary contact information  Call [phone] to page    1st Call - Day Intern (R1):   Dr Mcneill 2nd Call - Day Sr. Resident (R2/R3):   Dr Molina         Reason for interval visit  (Principal Problem)   Atrial Fibrillation w/ RVR      Interval Problem Daily Status Update  (24 hours, problem oriented, brief subjective history, new lab/imaging data pertinent to that problem)   No acute events overnight reported.  Patient has felt better after Cardioversion.  Denies chest pain, flutter sensation, palpitations, SOB.  Patient alert and oriented times 4, eating and drinking well.  Patient underwent Stress Test:  Cardiology reviewed the myocardial perfusion study.  Per her interpretation artifact was noted in the inferior segment secondary to subdiaphragmatic attenuation (more artifact present in the stress images as significant gastric uptake in those images).  EF reported as mildly abnormal however on echocardiogram his EF is completely preserved.  No evidence of reversible defects noted.   Given the absence of reversible defects and a normal LV systolic function,.   Continue diltiazem which has been changed to 240 mg XL once daily.  Okay to continue digoxin at current dose.   Heparin will be stopped and Xarelto will be started this evening.  Patient was able to ambulate to the restroom, his high rate stayed low 100's, patient was asymptomatic.  Patient will be ready to discharge in the evening, but patient does not have a ride, so he prefers to stay overnight and be discharge in the morning 11/10.    Review of Systems   Constitutional: Negative for chills and fever.   HENT: Negative for sore throat and  tinnitus.    Eyes: Negative for blurred vision, double vision and photophobia.   Respiratory: Negative for cough, shortness of breath and stridor.    Cardiovascular: Negative for chest pain, palpitations, orthopnea and leg swelling.   Gastrointestinal: Negative for abdominal pain, constipation, heartburn, nausea and vomiting.   Genitourinary: Negative for dysuria, frequency, hematuria and urgency.   Musculoskeletal: Negative for back pain, falls and myalgias.   Skin: Negative for rash.   Neurological: Negative for dizziness and headaches.   Endo/Heme/Allergies: Does not bruise/bleed easily.   Psychiatric/Behavioral: Negative for depression and suicidal ideas.       Disposition/Barriers to discharge:   None     Consultants/Specialty  Cardiology    PCP: MARTÍN Turner      Quality Measures  Quality-Core Measures   Reviewed items::  Medications reviewed, Labs reviewed and Radiology images reviewed  Rivera catheter::  No Rivera  DVT prophylaxis pharmacological::  Heparin  DVT prophylaxis - mechanical:  SCDs  Ulcer Prophylaxis::  Yes          Physical Exam       Vitals:    11/09/19 0000 11/09/19 0415 11/09/19 0558 11/09/19 0708   BP: 144/80 116/80 114/76 113/67   Pulse: 84 71 75 76   Resp: 16 16  19   Temp: 36.8 °C (98.2 °F) 36.7 °C (98.1 °F)  36.7 °C (98 °F)   TempSrc: Temporal Temporal  Temporal   SpO2: 99% 98%  99%   Weight:       Height:         Body mass index is 33.39 kg/m². Weight: 108.6 kg (239 lb 6.7 oz)  Oxygen Therapy:  Pulse Oximetry: 99 %, O2 (LPM): 2.5, O2 Delivery: None (Room Air)    Physical Exam   Constitutional: He is oriented to person, place, and time and well-developed, well-nourished, and in no distress. No distress.   HENT:   Head: Normocephalic and atraumatic.   Mouth/Throat: Oropharynx is clear and moist. No oropharyngeal exudate.   Eyes: Pupils are equal, round, and reactive to light. Conjunctivae and EOM are normal. Right eye exhibits no discharge.   Neck: Normal range of  motion. Neck supple. No JVD present.   Cardiovascular: Normal rate, regular rhythm and intact distal pulses.   Murmur heard.  Pulmonary/Chest: Effort normal and breath sounds normal. No respiratory distress. He has no wheezes. He has no rales. He exhibits no tenderness.   Abdominal: Soft. Bowel sounds are normal. He exhibits no distension and no mass. There is no tenderness. There is no rebound and no guarding.   Musculoskeletal: Normal range of motion.         General: No edema.   Neurological: He is alert and oriented to person, place, and time. Coordination normal. GCS score is 15.   Skin: Skin is warm. He is not diaphoretic. No erythema.   Psychiatric: Mood, memory, affect and judgment normal.             Assessment/Plan     Atrial fibrillation with rapid ventricular response (HCC)  Assessment & Plan  -Patient presented with new onset atrial fibrillation with rapid ventricular response  -Received 2 IV pushes Diltiazem in the ED  -Cardiology was Consulted  -Overnight A fib Rate   - After getting up HR went to 180's  - Cardio on board  - Echo: 55% EF, Moderate Aortic dilation 4.3 cm diameter   -MIA No atrial Thrombi seen  - Cardioverted, continues in Sinus Rhythm  - Stress test:   Artifact was noted in the inferior segment secondary to subdiaphragmatic attenuation (more artifact present in the stress images as significant gastric uptake in those images).  EF reported as mildly abnormal however on echocardiogram his EF is completely preserved.  No evidence of reversible defects noted.                   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                Plan:  -continue IV cefazolin   -continue    -Home pain management     Aortic root dilatation (HCC)- (present on admission)  Assessment & Plan  History of Aortic Root Dilation  ECHO today 11/6: Aorta 4.3 cm diameter       Plan:  Follow up outpatient  Monitor with Cards/PCP    Bleeding ulcer- (present on admission)  Assessment & Plan  History of PUD  Patient on Omeprazole  NO active bleeding or recent bleed  Stable H&H            Plan:  - Continue Omeprazole  - GI consulted, minimal GI risk bleed    Essential hypertension- (present on admission)  Assessment & Plan  -/89 on admission  -history of lower ext edema   - Soft Morning BP, patient asymptomatic       Plan:  -Lasix  -Losartan  -Low salt diet    Rheumatoid arthritis (HCC)- (present on admission)  Assessment & Plan  -History of rheumatoid arthritis on chronic immunosuppression  -History of peptic ulcer disease with GI bleed in the past  -On prednisone at home     Plan:  -Continue prednisone  -Avoid NSAIDs

## 2019-11-09 NOTE — DISCHARGE PLANNING
Anticipated Discharge Disposition: home    Action: price check for Xarelto. Spoke with pharacist at [facility] in [place]: $0 for patient cost to Xarelto. Notified Katelynn JACOB.     Barriers to Discharge: Medical clearance    Plan: follow up as needed

## 2019-11-09 NOTE — CARE PLAN
Problem: Safety  Goal: Will remain free from injury  Outcome: PROGRESSING AS EXPECTED  Patient's risk for injury and falls assessed. Appropriate safety precautions in place. Patient educated to utilize call light for needs. Patient verbalizes understanding.       Problem: Venous Thromboembolism (VTW)/Deep Vein Thrombosis (DVT) Prevention:  Goal: Patient will participate in Venous Thrombosis (VTE)/Deep Vein Thrombosis (DVT)Prevention Measures  Outcome: PROGRESSING AS EXPECTED  Patient educated on DVT risks. Patient VS are stable. No signs of DVT. Will continue to monitor for change status.

## 2019-11-09 NOTE — CARE PLAN
Problem: Pain Management  Goal: Pain level will decrease to patient's comfort goal  Outcome: PROGRESSING AS EXPECTED   Patient educated on pain management strategies. Patient's pain is being managed with PRN and scheduled medications.     Problem: Respiratory:  Goal: Respiratory status will improve  Outcome: PROGRESSING AS EXPECTED  Patient ambulated to door and back to room. Tolerated well on room air with O2 saturation ranging 94-95. Currently on room air.

## 2019-11-10 VITALS
RESPIRATION RATE: 16 BRPM | WEIGHT: 233.91 LBS | SYSTOLIC BLOOD PRESSURE: 117 MMHG | HEIGHT: 71 IN | DIASTOLIC BLOOD PRESSURE: 94 MMHG | HEART RATE: 75 BPM | BODY MASS INDEX: 32.75 KG/M2 | TEMPERATURE: 97.7 F | OXYGEN SATURATION: 97 %

## 2019-11-10 LAB — APTT PPP: 30.8 SEC (ref 24.7–36)

## 2019-11-10 PROCEDURE — 700102 HCHG RX REV CODE 250 W/ 637 OVERRIDE(OP): Performed by: HOSPITALIST

## 2019-11-10 PROCEDURE — A9270 NON-COVERED ITEM OR SERVICE: HCPCS | Performed by: STUDENT IN AN ORGANIZED HEALTH CARE EDUCATION/TRAINING PROGRAM

## 2019-11-10 PROCEDURE — 85730 THROMBOPLASTIN TIME PARTIAL: CPT

## 2019-11-10 PROCEDURE — 700102 HCHG RX REV CODE 250 W/ 637 OVERRIDE(OP): Performed by: STUDENT IN AN ORGANIZED HEALTH CARE EDUCATION/TRAINING PROGRAM

## 2019-11-10 PROCEDURE — 99239 HOSP IP/OBS DSCHRG MGMT >30: CPT | Mod: GC | Performed by: INTERNAL MEDICINE

## 2019-11-10 PROCEDURE — 36415 COLL VENOUS BLD VENIPUNCTURE: CPT

## 2019-11-10 PROCEDURE — A9270 NON-COVERED ITEM OR SERVICE: HCPCS | Performed by: HOSPITALIST

## 2019-11-10 PROCEDURE — 700111 HCHG RX REV CODE 636 W/ 250 OVERRIDE (IP): Performed by: STUDENT IN AN ORGANIZED HEALTH CARE EDUCATION/TRAINING PROGRAM

## 2019-11-10 PROCEDURE — 700102 HCHG RX REV CODE 250 W/ 637 OVERRIDE(OP): Performed by: INTERNAL MEDICINE

## 2019-11-10 PROCEDURE — A9270 NON-COVERED ITEM OR SERVICE: HCPCS | Performed by: INTERNAL MEDICINE

## 2019-11-10 RX ORDER — DIGOXIN 125 MCG
125 TABLET ORAL DAILY
Qty: 30 TAB | Refills: 0 | Status: ON HOLD | OUTPATIENT
Start: 2019-11-10 | End: 2020-01-22

## 2019-11-10 RX ORDER — DILTIAZEM HYDROCHLORIDE 240 MG/1
240 CAPSULE, COATED, EXTENDED RELEASE ORAL DAILY
Qty: 30 CAP | Refills: 0 | Status: ON HOLD | OUTPATIENT
Start: 2019-11-11 | End: 2020-01-22

## 2019-11-10 RX ADMIN — PREGABALIN 150 MG: 150 CAPSULE ORAL at 12:43

## 2019-11-10 RX ADMIN — FUROSEMIDE 20 MG: 20 TABLET ORAL at 10:31

## 2019-11-10 RX ADMIN — DILTIAZEM HYDROCHLORIDE 240 MG: 240 CAPSULE, COATED, EXTENDED RELEASE ORAL at 06:20

## 2019-11-10 RX ADMIN — HYDROCODONE BITARTRATE AND ACETAMINOPHEN 1 TABLET: 10; 325 TABLET ORAL at 06:20

## 2019-11-10 RX ADMIN — SENNOSIDES AND DOCUSATE SODIUM 2 TABLET: 8.6; 5 TABLET ORAL at 06:28

## 2019-11-10 RX ADMIN — ACETAMINOPHEN 1000 MG: 500 TABLET ORAL at 12:43

## 2019-11-10 RX ADMIN — HYDROCODONE BITARTRATE AND ACETAMINOPHEN 1 TABLET: 10; 325 TABLET ORAL at 15:13

## 2019-11-10 RX ADMIN — MORPHINE SULFATE 15 MG: 15 TABLET, EXTENDED RELEASE ORAL at 14:06

## 2019-11-10 RX ADMIN — CEFAZOLIN SODIUM 2 G: 2 INJECTION, SOLUTION INTRAVENOUS at 06:21

## 2019-11-10 RX ADMIN — PREDNISONE 10 MG: 10 TABLET ORAL at 06:21

## 2019-11-10 RX ADMIN — NICOTINE 7 MG: 7 PATCH, EXTENDED RELEASE TRANSDERMAL at 06:21

## 2019-11-10 RX ADMIN — ACETAMINOPHEN 1000 MG: 500 TABLET ORAL at 06:21

## 2019-11-10 RX ADMIN — MORPHINE SULFATE 15 MG: 15 TABLET, EXTENDED RELEASE ORAL at 06:20

## 2019-11-10 RX ADMIN — OMEPRAZOLE 40 MG: 20 CAPSULE, DELAYED RELEASE ORAL at 06:21

## 2019-11-10 RX ADMIN — PREGABALIN 150 MG: 150 CAPSULE ORAL at 06:21

## 2019-11-10 RX ADMIN — LOSARTAN POTASSIUM 25 MG: 25 TABLET ORAL at 10:31

## 2019-11-10 ASSESSMENT — ENCOUNTER SYMPTOMS
BRUISES/BLEEDS EASILY: 0
CHILLS: 0
CHOKING: 0
CONSTIPATION: 0
DEPRESSION: 0
BLURRED VISION: 0
NAUSEA: 0
DOUBLE VISION: 0
WHEEZING: 0
BACK PAIN: 0
CHEST TIGHTNESS: 0
ABDOMINAL PAIN: 0
ORTHOPNEA: 0
APNEA: 0
SORE THROAT: 0
SHORTNESS OF BREATH: 0
FALLS: 0
STRIDOR: 0
FEVER: 0
VOMITING: 0
HEARTBURN: 0
DIZZINESS: 0
COUGH: 0
HEADACHES: 0
PHOTOPHOBIA: 0
PALPITATIONS: 0
MYALGIAS: 0

## 2019-11-10 NOTE — PROGRESS NOTES
Internal Medicine Interval Note  Note Author: Ari Molina M.D.     Name Richard Hubbard     1975   Age/Sex 44 y.o. male   MRN [id number]   Code Status Full Code     After 5PM or if no immediate response to page, please call for cross-coverage  Attending/Team: Dr Metz/Radha See Patient List for primary contact information  Call [phone] to page    1st Call - Day Intern (R1):   Dr Mcneill 2nd Call - Day Sr. Resident (R2/R3):   Dr Molina         Reason for interval visit  (Principal Problem)   Atrial Fibrillation w/ RVR      Interval Problem Daily Status Update  (24 hours, problem oriented, brief subjective history, new lab/imaging data pertinent to that problem)   -LATONIA o/n, patient started xarelto yesterday, pending discharge today  -discussed medication reconciliation, follow up with PCP (scheduled for 11/15) and cardiology with patient    Review of Systems   Constitutional: Negative for chills and fever.   HENT: Negative for sore throat and tinnitus.    Eyes: Negative for blurred vision, double vision and photophobia.   Respiratory: Negative for cough, shortness of breath and stridor.    Cardiovascular: Negative for chest pain, palpitations, orthopnea and leg swelling.   Gastrointestinal: Negative for abdominal pain, constipation, heartburn, nausea and vomiting.   Genitourinary: Negative for dysuria, frequency, hematuria and urgency.   Musculoskeletal: Negative for back pain, falls and myalgias.   Skin: Negative for rash.   Neurological: Negative for dizziness and headaches.   Endo/Heme/Allergies: Does not bruise/bleed easily.   Psychiatric/Behavioral: Negative for depression and suicidal ideas.       Disposition/Barriers to discharge:   None     Consultants/Specialty  Cardiology    PCP: MARTÍN Turner      Quality Measures  Quality-Core Measures   Reviewed items::  Medications reviewed, Labs reviewed and Radiology images reviewed  Rivera catheter::  No Rivera  DVT  prophylaxis pharmacological::  Heparin  DVT prophylaxis - mechanical:  SCDs  Ulcer Prophylaxis::  Yes          Physical Exam       Vitals:    11/09/19 2000 11/09/19 2346 11/10/19 0400 11/10/19 0730   BP: 109/72 (!) 91/56 110/68 117/94   Pulse: 85 79 83 75   Resp: 16 16 16 16   Temp: 36.8 °C (98.2 °F) 36.6 °C (97.8 °F) 36.8 °C (98.2 °F) 36.5 °C (97.7 °F)   TempSrc: Temporal Temporal Temporal Temporal   SpO2: 97% 95% 96% 97%   Weight: 106.1 kg (233 lb 14.5 oz)      Height:         Body mass index is 32.62 kg/m². Weight: 106.1 kg (233 lb 14.5 oz)  Oxygen Therapy:  Pulse Oximetry: 97 %, O2 (LPM): 0, O2 Delivery: None (Room Air)    Physical Exam   Constitutional: He is oriented to person, place, and time and well-developed, well-nourished, and in no distress. No distress.   HENT:   Head: Normocephalic and atraumatic.   Mouth/Throat: Oropharynx is clear and moist. No oropharyngeal exudate.   Eyes: Pupils are equal, round, and reactive to light. Conjunctivae and EOM are normal. Right eye exhibits no discharge.   Neck: Normal range of motion. Neck supple. No JVD present.   Cardiovascular: Normal rate, regular rhythm and intact distal pulses.   Murmur heard.  Pulmonary/Chest: Effort normal and breath sounds normal. No respiratory distress. He has no wheezes. He has no rales. He exhibits no tenderness.   Abdominal: Soft. Bowel sounds are normal. He exhibits no distension and no mass. There is no tenderness. There is no rebound and no guarding.   Musculoskeletal: Normal range of motion.         General: No edema.   Neurological: He is alert and oriented to person, place, and time. Coordination normal. GCS score is 15.   Skin: Skin is warm. He is not diaphoretic. No erythema.   Psychiatric: Mood, memory, affect and judgment normal.             Assessment/Plan     Atrial fibrillation with rapid ventricular response (HCC)  Assessment & Plan  -Patient presented with new onset atrial fibrillation with rapid ventricular  response  -Received 2 IV pushes Diltiazem in the ED  -Cardiology was Consulted  -Overnight A fib Rate   - After getting up HR went to 180's  - Cardio on board  - Echo: 55% EF, Moderate Aortic dilation 4.3 cm diameter   -MIA No atrial Thrombi seen  - Cardioverted, continues in Sinus Rhythm  - Stress test:   Artifact was noted in the inferior segment secondary to subdiaphragmatic attenuation (more artifact present in the stress images as significant gastric uptake in those images).  EF reported as mildly abnormal however on echocardiogram his EF is completely preserved.  No evidence of reversible defects noted.                   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                Plan:  -continue IV cefazolin   -continue    -Home pain management     Aortic root dilatation (HCC)- (present on admission)  Assessment & Plan  History of Aortic Root Dilation  ECHO today 11/6: Aorta 4.3 cm diameter       Plan:  Follow up outpatient  Monitor with Cards/PCP    Bleeding ulcer- (present on admission)  Assessment & Plan  History of PUD  Patient on Omeprazole  NO active bleeding or recent bleed  Stable H&H            Plan:  - Continue Omeprazole  - GI consulted, minimal GI risk bleed    Essential hypertension- (present on admission)  Assessment & Plan  -/89 on admission  -history of lower ext edema   - Soft Morning BP, patient asymptomatic       Plan:  -Lasix  -Losartan  -Low salt diet    Rheumatoid arthritis (HCC)- (present on admission)  Assessment & Plan  -History of rheumatoid arthritis on chronic  immunosuppression  -History of peptic ulcer disease with GI bleed in the past  -On prednisone at home     Plan:  -Continue prednisone  -Avoid NSAIDs

## 2019-11-10 NOTE — PROGRESS NOTES
Received Bedside report. Assumed care at 1900. This pt is AOx4, ambulatory with walker/SBA, voiding adequtaely, reports no emergent pain. Patient and RN discussed plan of care: questions answered. Labs noted, assessment complete, patient tolerating regular diet. Tele box in place. Pt is on RA of O2. Call light in place, fall precautions in place, patient educated on importance of calling for assistance. No additional needs at this time. VSS

## 2019-11-10 NOTE — PROGRESS NOTES
Cardiology Follow Up Progress Note    Date of Service  11/10/2019    Attending Physician  Joaquin Metz M.D.      Admitted for sudden & acute onset of palpitations associated with dyspnea and dizziness, found to be in A. fib RVR, rate 150      Cardiology consultation for new onset A. fib RVR      History of: No prior cardiac history, known rheumatoid arthritis on Humira, hospitalized for coffee-ground emesis August 2019 and again melena in September, 2019 (hemoglobin was found to be 7 s/p blood transfusion), history of peptic ulcer disease status post cauterization, October 2019 found to have left pubic ramus fracture after a mechanical fall of Audust ( 2019 ) was diagnosed with osteomyelitis and underwent surgical debridement with antibiotics, quit alcohol intake 3 to 4 years ago      Labs reviewed  Sodium, potassium, creatinine stable      Blood pressure  117/94  Heart rate  Sinus rhythm    Interim Events    11/10/19 no overnight cardiac events, maintaining sinus rhythm, ambulated overnight, asymptomatic, plan for discharge today, we discussed close cardiology/electrophysiology follow-up, our office will contact patient this week for early follow-up, questions answered.    11/19/19 no overnight cardiac events, maintaining SR, denies  symptoms     11/8/19  No overnight cardiac events, underwent successful MIA/DCCV this am, currently in SR, NPO for MPI, discussed with family at bedside, questions regarding MP and AA were answered .      11/7/19 remains in afib RVR with exertion, HR at rest 80's, D/W Dr Alarcon uptitrate Dilt for better rate control, frustrated with being in the hospital, needs R knee surgery at some point    Review of Systems  Review of Systems   Respiratory: Negative for apnea, cough, choking, chest tightness, shortness of breath, wheezing and stridor.    Cardiovascular: Negative for chest pain and leg swelling.       Vital signs in last 24 hours  Temp:  [36.5 °C (97.7 °F)-36.9 °C (98.4 °F)]  36.5 °C (97.7 °F)  Pulse:  [74-85] 75  Resp:  [16-18] 16  BP: ()/(56-94) 117/94  SpO2:  [95 %-97 %] 97 %    Physical Exam  Physical Exam  HENT:      Head: Normocephalic.      Mouth/Throat:      Mouth: Mucous membranes are moist.   Cardiovascular:      Rate and Rhythm: Normal rate and regular rhythm.      Heart sounds: Murmur present.   Pulmonary:      Effort: Pulmonary effort is normal.   Abdominal:      General: Abdomen is flat.   Skin:     General: Skin is warm.   Neurological:      Mental Status: He is alert and oriented to person, place, and time.         Lab Review  Lab Results   Component Value Date/Time    WBC 6.0 2019 04:00 AM    WBC 8.4 10/19/2010 12:05 PM    RBC 3.82 (L) 2019 04:00 AM    RBC 4.96 10/19/2010 12:05 PM    HEMOGLOBIN 11.2 (L) 2019 04:00 AM    HEMATOCRIT 36.7 (L) 2019 04:00 AM    MCV 96.1 2019 04:00 AM    MCV 85 10/19/2010 12:05 PM    MCH 29.3 2019 04:00 AM    MCH 28.4 10/19/2010 12:05 PM    MCHC 30.5 (L) 2019 04:00 AM    MPV 9.0 2019 04:00 AM      Lab Results   Component Value Date/Time    SODIUM 138 2019 04:59 AM    POTASSIUM 4.2 2019 04:59 AM    CHLORIDE 107 2019 04:59 AM    CO2 23 2019 04:59 AM    GLUCOSE 80 2019 04:59 AM    BUN 18 2019 04:59 AM    CREATININE 0.77 2019 04:59 AM    CREATININE 0.95 10/19/2010 12:05 PM    BUNCREATRAT 9 10/19/2010 12:05 PM    GLOMRATE >59 10/19/2010 12:05 PM      Lab Results   Component Value Date/Time    ASTSGOT 17 2019 04:59 AM    ALTSGPT 9 2019 04:59 AM     Lab Results   Component Value Date/Time    CHOLSTRLTOT 114 2019 03:11 PM    LDL 37 2019 03:11 PM    HDL 64 2019 03:11 PM    TRIGLYCERIDE 64 2019 03:11 PM    TROPONINT 13 2019 11:17 PM       No results for input(s): NTPROBNP in the last 72 hours.    Cardiac Imaging and Procedures Review      EK19, A. fib, rate 145    Echocardiogram: 19, normal LV systolic  function, moderate aortic stenosis, peak 40 mmHg, mean 23 mmHg, suspect bicuspid aortic valve, ascending aorta is dilated with diameter 4.3 cm, EF 55%      Assessment/Plan    New onset atrial fibrillation RVR, rate controlled at rest, RVR with exertion  -s/p successful MIA/DCCV 11/8/19  -Maintaining SR, plan for early follow-up with electrophysiologist as an outpatient for evaluation for EP study pulmonary vein isolation.  -Digoxin 125 mcg, Diltiazem 240 mg  -Continue with Xarelto  -well preserved EF, 55%      Osteomyelitis (diagnosed 9/13/19, FX left pelvis after he tripped and fell)  -On Acef    History of rheumatoid arthritis  -Prednisone      Hypertension  -well controlled   -Losartan, Dilt    Bicuspid aortic valve   -stable  -will follow up as an out patient      Cardiology will sign off  Please do not hesitate to contact us with any questions.  Our cardiology office will contact patient on Monday to establish care early next week with an electrophysiology for EP study and ablation evaluation, patient understands to call our office ( [phone]) by Tuesday if no one from cardiology has contacted him by then.        Please contact me with any questions.    MAURICIO Marsh.   Cardiologist, [facility]  [phone]

## 2019-11-10 NOTE — PROGRESS NOTES
Patient discharge instructions reviewed. Patient dicharged with PICC in place to receive out patient IV antibiotics. Patient has Home Health to care for PICC. PICC dressing clean dry and intact. Patient tele monitor removed. Patient discharged to home with wife.

## 2019-11-10 NOTE — DISCHARGE INSTRUCTIONS
Discharge Instructions    Discharged to home by car with relative. Discharged via wheelchair, hospital escort: Yes.  Special equipment needed: Not Applicable    Be sure to schedule a follow-up appointment with your primary care doctor or any specialists as instructed.     Discharge Plan:   Diet Plan: Discussed  Activity Level: Discussed  Confirmed Follow up Appointment: Patient to Call and Schedule Appointment  Confirmed Symptoms Management: Discussed  Medication Reconciliation Updated: Yes  Influenza Vaccine Indication: Not indicated: Previously immunized this influenza season and > 8 years of age    I understand that a diet low in cholesterol, fat, and sodium is recommended for good health. Unless I have been given specific instructions below for another diet, I accept this instruction as my diet prescription.   Other diet: Regular      · Is patient discharged on Warfarin / Coumadin?   No     Depression / Suicide Risk    As you are discharged from this [facility] facility, it is important to learn how to keep safe from harming yourself.    Recognize the warning signs:  · Abrupt changes in personality, positive or negative- including increase in energy   · Giving away possessions  · Change in eating patterns- significant weight changes-  positive or negative  · Change in sleeping patterns- unable to sleep or sleeping all the time   · Unwillingness or inability to communicate  · Depression  · Unusual sadness, discouragement and loneliness  · Talk of wanting to die  · Neglect of personal appearance   · Rebelliousness- reckless behavior  · Withdrawal from people/activities they love  · Confusion- inability to concentrate     If you or a loved one observes any of these behaviors or has concerns about self-harm, here's what you can do:  · Talk about it- your feelings and reasons for harming yourself  · Remove any means that you might use to hurt yourself (examples: pills, rope, extension cords, firearm)  · Get  professional help from the community (Mental Health, Substance Abuse, psychological counseling)  · Do not be alone:Call your Safe Contact- someone whom you trust who will be there for you.  · Call your local CRISIS HOTLINE 537-7652 or 101-976-0452  · Call your local Children's Mobile Crisis Response Team [place] (585) 243-3928 or www.Fly6  · Call the toll free National Suicide Prevention Hotlines   · National Suicide Prevention Lifeline 912-092-KKIJ (6707)  · [place] Network 800-SUICIDE (618-0569)      Atrial Fibrillation  Introduction  Atrial fibrillation is a type of heartbeat that is irregular or fast (rapid). If you have this condition, your heart keeps quivering in a weird (chaotic) way. This condition can make it so your heart cannot pump blood normally. Having this condition gives a person more risk for stroke, heart failure, and other heart problems. There are different types of atrial fibrillation. Talk with your doctor to learn about the type that you have.  Follow these instructions at home:  · Take over-the-counter and prescription medicines only as told by your doctor.  · If your doctor prescribed a blood-thinning medicine, take it exactly as told. Taking too much of it can cause bleeding. If you do not take enough of it, you will not have the protection that you need against stroke and other problems.  · Do not use any tobacco products. These include cigarettes, chewing tobacco, and e-cigarettes. If you need help quitting, ask your doctor.  · If you have apnea (obstructive sleep apnea), manage it as told by your doctor.  · Do not drink alcohol.  · Do not drink beverages that have caffeine. These include coffee, soda, and tea.  · Maintain a healthy weight. Do not use diet pills unless your doctor says they are safe for you. Diet pills may make heart problems worse.  · Follow diet instructions as told by your doctor.  · Exercise regularly as told by your doctor.  · Keep all  follow-up visits as told by your doctor. This is important.  Contact a doctor if:  · You notice a change in the speed, rhythm, or strength of your heartbeat.  · You are taking a blood-thinning medicine and you notice more bruising.  · You get tired more easily when you move or exercise.  Get help right away if:  · You have pain in your chest or your belly (abdomen).  · You have sweating or weakness.  · You feel sick to your stomach (nauseous).  · You notice blood in your throw up (vomit), poop (stool), or pee (urine).  · You are short of breath.  · You suddenly have swollen feet and ankles.  · You feel dizzy.  · Your suddenly get weak or numb in your face, arms, or legs, especially if it happens on one side of your body.  · You have trouble talking, trouble understanding, or both.  · Your face or your eyelid droops on one side.  These symptoms may be an emergency. Do not wait to see if the symptoms will go away. Get medical help right away. Call your local emergency services (911 in the [place] Do not drive yourself to the hospital.   This information is not intended to replace advice given to you by your health care provider. Make sure you discuss any questions you have with your health care provider.  Document Released: 09/26/2009 Document Revised: 05/25/2017 Document Reviewed: 04/13/2016  © 2017 Elsevier

## 2019-11-10 NOTE — DISCHARGE SUMMARY
Internal Medicine Discharge Summary  Note Author: Ari Molina M.D.       Name Richard Hubbard     1975   Age/Sex 44 y.o. male   MRN [id number]         Admit Date:  2019       Discharge Date:   11/10/2019    Service:   R Internal Medicine Yellow Team  Attending Physician(s):   Sarahi Metz       Senior Resident(s):   Jesse  Dennis Resident(s):   Osito  PCP: MARTÍN Turner      Primary Diagnosis:   Atrial fibrillation with rapid ventricular response (HCC)    Secondary Diagnoses:                Principal Problem:    Atrial fibrillation with rapid ventricular response (HCC) POA: Unknown  Active Problems:    Bleeding ulcer POA: Yes      Overview: History of anemia-now on nexium    Aortic root dilatation (HCC) POA: Yes    Osteomyelitis (HCC) POA: Yes    Rheumatoid arthritis (HCC) (Chronic) POA: Yes    Essential hypertension POA: Yes  Resolved Problems:    * No resolved hospital problems. *      Hospital Summary (Brief Narrative):       44M, PMH of aortic root dilation and moderate aortic stenosis, PUD,Lucila-en-Y bypass , rheumatoid arthritis (on prednisone), obstructive sleep apnea; presented with atrial fibrillation with RVR.  at admission, received diltiazem in ED with improvement to 130-140s. Vitals otherwise stable. His electrolytes are within normal limits, UDS negative, troponin VII, TSH 0.90, CTA showed no PE, chest x-ray negative. Cardiology consulted, started on digoxin and diltiazem. Patient had a recent admission for GI bleed, and discussed with GI on call at cardiology request for anticoagulation considering plan for cardioversion which GI approved while patient did not show signs of active bleeding. Patient cardioverted on  with return to sinus rhythm. Flecainide trialled but not continued on discharge, as patient is interested in abalation. Patient was managed on heparin drip then transitioned to xarelto on discharge. Echo showed 55% EF, moderated  aortic dilation 4.3cm diameter. Cardiology reviewed myocardial perfusion study which showed artifact but on their review did not find evidence of reversible defects noted.    Medication reconciliation as per below, follow ups with PCP and cardiology arranged; discussed with patient. Patient discharged in stable condition.    Patient /Hospital Summary (Details -- Problem Oriented) :          * Atrial fibrillation with rapid ventricular response (HCC)  Assessment & Plan  -Patient presented with new onset atrial fibrillation with rapid ventricular response  -Received 2 IV pushes Diltiazem in the ED  -Cardiology was Consulted  -Overnight A fib Rate   - After getting up HR went to 180's  - Cardio on board  - Echo: 55% EF, Moderate Aortic dilation 4.3 cm diameter   -MIA No atrial Thrombi seen  - Cardioverted, continues in Sinus Rhythm  - Stress test:   Artifact was noted in the inferior segment secondary to subdiaphragmatic attenuation (more artifact present in the stress images as significant gastric uptake in those images).  EF reported as mildly abnormal however on echocardiogram his EF is completely preserved.  No evidence of reversible defects noted.                   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                Plan:  -continue IV cefazolin   -continue    -Home pain management     Aortic root dilatation (HCC)  Assessment & Plan  History of Aortic Root Dilation  ECHO today 11/6: Aorta 4.3 cm diameter       Plan:  Follow up outpatient  Monitor with Cards/PCP    Bleeding ulcer  Assessment & Plan  History of PUD  Patient on  Omeprazole  NO active bleeding or recent bleed  Stable H&H            Plan:  - Continue Omeprazole  - GI consulted, minimal GI risk bleed    Essential hypertension  Assessment & Plan  -/89 on admission  -history of lower ext edema   - Soft Morning BP, patient asymptomatic       Plan:  -Lasix  -Losartan  -Low salt diet    Rheumatoid arthritis (HCC)  Assessment & Plan  -History of rheumatoid arthritis on chronic immunosuppression  -History of peptic ulcer disease with GI bleed in the past  -On prednisone at home     Plan:  -Continue prednisone  -Avoid NSAIDs      Consultants:     Cardiology    Procedures:        11/8 Cardioversion    Imaging/ Testing:      NM-CARDIAC STRESS TEST   Final Result      EC-MIA W/O CONT   Final Result      EC-ECHOCARDIOGRAM COMPLETE W/O CONT   Final Result      CT-CTA CHEST PULMONARY ARTERY W/ RECONS   Final Result      1.  No CT evidence of pulmonary embolism.      2.  Minimal faint pulmonary opacifications right upper pulmonary lobe are likely due to infectious or inflammatory process.            DX-CHEST-LIMITED (1 VIEW)   Final Result      1.  No acute cardiac or pulmonary abnormalities are identified.      CL-CARDIOVERSION    (Results Pending)         Discharge Medications:         Medication Reconciliation: Completed       Medication List      START taking these medications      Instructions   digoxin 125 MCG Tabs  Commonly known as:  LANOXIN   Take 1 Tab by mouth every day at 6 PM.  Dose:  125 mcg     DILTIAZem  MG Cp24  Start taking on:  November 11, 2019  Commonly known as:  CARDIZEM CD   Take 1 Cap by mouth every day.  Dose:  240 mg     rivaroxaban 20 MG Tabs tablet  Commonly known as:  XARELTO   Take 1 Tab by mouth with dinner.  Dose:  20 mg        CONTINUE taking these medications      Instructions   acetaminophen 500 MG Tabs  Commonly known as:  TYLENOL   Take 2 Tabs by mouth 3 times a day.  Dose:  1,000 mg     Cholecalciferol 1000 UNIT Tabs  Commonly known as:   VITAMIND D3   Take 1 Tab by mouth every day.  Dose:  1,000 Units     furosemide 20 MG Tabs  Commonly known as:  LASIX   Doctor's comments:  0800 and 1400  Take 1 Tab by mouth 2 times a day.  Dose:  20 mg     HYDROcodone/acetaminophen  MG Tabs  Commonly known as:  NORCO   Take 1 Tab by mouth every 6 hours as needed for up to 14 days.  Dose:  1 Tab     losartan 25 MG Tabs  Commonly known as:  COZAAR   Take 1 Tab by mouth every day.  Dose:  25 mg     morphine ER 15 MG Tbcr tablet  Commonly known as:  MS CONTIN   Take 1 Tab by mouth 3 times a day for 14 days.  Dose:  15 mg     nicotine 7 MG/24HR Pt24  Commonly known as:  NICODERM   Apply 1 Patch to skin as directed every 24 hours.  Dose:  1 Patch     NS SOLN 100 mL with ceFAZolin 10 GM SOLR 2 g   2 g by Intravenous route every 8 hours for 33 days.  Dose:  2 g     omeprazole 40 MG delayed-release capsule  Commonly known as:  PRILOSEC   Take 1 Cap by mouth every day.  Dose:  40 mg     predniSONE 10 MG Tabs  Commonly known as:  DELTASONE   Take 10 mg by mouth every day.  Dose:  10 mg     pregabalin 150 MG Caps  Commonly known as:  LYRICA   Take 1 Cap by mouth 3 times a day for 30 days.  Dose:  150 mg            Can use .DISCHARGEMEDSLIST if going to another facility         Disposition:   Home    Diet:   As tolerated, cardiac    Activity:   As tolerated    Instructions:         The patient was instructed to return to the ER in the event of worsening symptoms. I have counseled the patient on the importance of compliance and the patient has agreed to proceed with all medical recommendations and follow up plan indicated above.   The patient understands that all medications come with benefits and risks. Risks may include permanent injury or death and these risks can be minimized with close reassessment and monitoring.        Primary Care Provider:      Discharge summary faxed to primary care provider:  Deferred  Copy of discharge summary given to the patient:  Deferred      Follow up appointment details :      Future Appointments   Date Time Provider Department Center   11/13/2019  1:40 PM MARTÍN Turner [address]   11/14/2019  1:00 PM [facility] MRI 1 MR FELICE Hahn   11/14/2019  5:00 PM Gerardo Gutierrez M.D. [facility] None   11/15/2019  1:00 PM MARTÍN Alejandra [address]   11/20/2019 11:20 AM MARTÍN Turner [address]   12/10/2019  1:00 PM Pat Valdez M.D. [facility] heart and vascular   [address] [place] [id number]  [phone]  Call in 3 days  Out patient ablaition eval        Pending Studies:        none    Time spent on discharge day patient visit, preparing discharge paperwork and arranging for patient follow up.    Summary of follow up issues:   -f/u with PCP and Cardiology, regarding atrial fibrillation this visit  -monitoring for ascending aorta dilation 4.0cm  -complete treatment for osteomyelitis  -clinical improvement, management of chronic conditions       Discharge Time (Minutes) :    55  Hospital Course Type:  Inpatient Stay >2 midnights      Condition on Discharge    ______________________________________________________________________    Interval history/exam for day of discharge:     -LATONIA o/n, patient started xarelto yesterday, pending discharge today  -discussed medication reconciliation, follow up with PCP (scheduled for 11/15) and cardiology with patient    Physical Exam   Constitutional: He is oriented to person, place, and time and well-developed, well-nourished, and in no distress. No distress.   HENT:   Head: Normocephalic and atraumatic.   Mouth/Throat: Oropharynx is clear and moist. No oropharyngeal exudate.   Eyes: Pupils are equal, round, and reactive to light. Conjunctivae and EOM are normal. Right eye exhibits no discharge.   Neck: Normal range of motion. Neck supple. No JVD present.   Cardiovascular: Normal rate, regular rhythm and intact distal  pulses.   Murmur heard.  Pulmonary/Chest: Effort normal and breath sounds normal. No respiratory distress. He has no wheezes. He has no rales. He exhibits no tenderness.   Abdominal: Soft. Bowel sounds are normal. He exhibits no distension and no mass. There is no tenderness. There is no rebound and no guarding.   Musculoskeletal: Normal range of motion.         General: No edema.   Neurological: He is alert and oriented to person, place, and time. Coordination normal. GCS score is 15.   Skin: Skin is warm. He is not diaphoretic. No erythema.   Psychiatric: Mood, memory, affect and judgment normal.     Most Recent Labs:    Lab Results   Component Value Date/Time    WBC 6.0 11/08/2019 04:00 AM    WBC 8.4 10/19/2010 12:05 PM    RBC 3.82 (L) 11/08/2019 04:00 AM    RBC 4.96 10/19/2010 12:05 PM    HEMOGLOBIN 11.2 (L) 11/08/2019 04:00 AM    HEMATOCRIT 36.7 (L) 11/08/2019 04:00 AM    MCV 96.1 11/08/2019 04:00 AM    MCV 85 10/19/2010 12:05 PM    MCH 29.3 11/08/2019 04:00 AM    MCH 28.4 10/19/2010 12:05 PM    MCHC 30.5 (L) 11/08/2019 04:00 AM    MPV 9.0 11/08/2019 04:00 AM    NEUTSPOLYS 41.20 (L) 11/08/2019 04:00 AM    LYMPHOCYTES 42.60 (H) 11/08/2019 04:00 AM    MONOCYTES 10.70 11/08/2019 04:00 AM    EOSINOPHILS 3.50 11/08/2019 04:00 AM    BASOPHILS 1.20 11/08/2019 04:00 AM    HYPOCHROMIA 1+ 10/25/2019 04:00 AM    ANISOCYTOSIS 1+ 10/25/2019 04:00 AM      Lab Results   Component Value Date/Time    SODIUM 138 11/09/2019 04:59 AM    POTASSIUM 4.2 11/09/2019 04:59 AM    CHLORIDE 107 11/09/2019 04:59 AM    CO2 23 11/09/2019 04:59 AM    GLUCOSE 80 11/09/2019 04:59 AM    BUN 18 11/09/2019 04:59 AM    CREATININE 0.77 11/09/2019 04:59 AM    CREATININE 0.95 10/19/2010 12:05 PM    BUNCREATRAT 9 10/19/2010 12:05 PM    GLOMRATE >59 10/19/2010 12:05 PM      Lab Results   Component Value Date/Time    ALTSGPT 9 11/09/2019 04:59 AM    ASTSGOT 17 11/09/2019 04:59 AM    ALKPHOSPHAT 68 11/09/2019 04:59 AM    TBILIRUBIN 0.2 11/09/2019 04:59 AM     DBILIRUBIN <0.1 02/10/2016 12:06 PM    LIPASE 27 08/02/2019 10:17 AM    ALBUMIN 3.3 11/09/2019 04:59 AM    GLOBULIN 2.4 11/09/2019 04:59 AM    INR 1.03 10/02/2019 08:59 AM    MACROCYTOSIS 1+ 10/25/2019 04:00 AM     Lab Results   Component Value Date/Time    PROTHROMBTM 13.6 10/02/2019 08:59 AM    INR 1.03 10/02/2019 08:59 AM

## 2019-11-10 NOTE — CARE PLAN
Problem: Safety  Goal: Will remain free from falls  Outcome: PROGRESSING AS EXPECTED  Note:   Frequent rounding to ensure patient's needs are met      Problem: Venous Thromboembolism (VTW)/Deep Vein Thrombosis (DVT) Prevention:  Goal: Patient will participate in Venous Thrombosis (VTE)/Deep Vein Thrombosis (DVT)Prevention Measures  Outcome: PROGRESSING AS EXPECTED  Flowsheets (Taken 11/9/2019 2000 by Danielle Healy, R.N.)  Pharmacologic Prophylaxis Used: Rivaroxaban (Xarelto) - (ONLY for Total Knee and Total Hip Surgery)  Note:   Pt taking Xarelto for VTE

## 2019-11-11 ENCOUNTER — TELEPHONE (OUTPATIENT)
Dept: CARDIOLOGY | Facility: MEDICAL CENTER | Age: 44
End: 2019-11-11

## 2019-11-11 NOTE — DISCHARGE PLANNING
[facility] Dr. Molina responded to text. Put in face to face, order, and provided paper copy of HH order.     BEATRIS contacted [facility] in [place], [phone]. They report that should be sufficient to reinstate services.   Requested it be faxed over.     BEATRIS faxed referral, paper order, and face to face to [facility] at [phone]    BEATRIS contacted patient at [phone] and updated that HH information has been sent to [facility]. Patient thanked BEATRIS.

## 2019-11-11 NOTE — DISCHARGE PLANNING
BEATRIS received call from patient. Reports his home health services were not reinstated upon D/C.   BEATRIS and [facility] reviewed chart. Physician did not put in HH order and patient was D/C over weekend.     BEATRIS texted physician, Dr. Molina to put in order. BEATRIS sent choice form to [facility]. [phone]    Pending physician response.

## 2019-11-11 NOTE — TELEPHONE ENCOUNTER
----- Message from ARASH Marsh sent at 11/9/2019  8:47 AM PST -----  Dr Alarcon requests that EP consult as soon as next week if at all possible for discussion about ablation versus antiarrhythmics thank you

## 2019-11-11 NOTE — FACE TO FACE
Face to Face Supporting Documentation - Home Health    The encounter with this patient was in whole or in part the primary reason for home health admission.    Date of encounter:   Patient:                    MRN:                       YOB: 2018  Richard Hubbard II  [id number]  1975     Home health to see patient for:  Skilled Nursing care for assessment, interventions & education, Home health aide, Physical Therapy evaluation and treatment and Occupational therapy evaluation and treatment     Skilled need for:  New Onset Medical Diagnosis Atrial fibrillation with RVR and Comment: osteomyelitis    Skilled nursing interventions to include:  Comment: interventions necessary for patient's skilled needs    Homebound status evidenced by:  Need the aid of supportive devices such as crutches, canes, wheelchairs or walkers. Leaving home requires a considerable and taxing effort. There is a normal inability to leave the home.    Community Physician to provide follow up care: MARTÍN Turner     Optional Interventions? No      I certify the face to face encounter for this home health care referral meets the CMS requirements and the encounter/clinical assessment with the patient was, in whole, or in part, for the medical condition(s) listed above, which is the primary reason for home health care. Based on my clinical findings: the service(s) are medically necessary, support the need for home health care, and the homebound criteria are met.  I certify that this patient has had a face to face encounter by myself.  Ari Molina M.D. - JEFFYI: [phone]

## 2019-11-11 NOTE — TELEPHONE ENCOUNTER
Patient scheduled for 11-20-19 at [facility] with Dr. Ivey. Per patient's request - scheduled next week.

## 2019-11-14 ENCOUNTER — OFFICE VISIT (OUTPATIENT)
Dept: PHYSICAL MEDICINE AND REHAB | Facility: MEDICAL CENTER | Age: 44
End: 2019-11-14
Payer: COMMERCIAL

## 2019-11-14 ENCOUNTER — HOSPITAL ENCOUNTER (OUTPATIENT)
Dept: RADIOLOGY | Facility: MEDICAL CENTER | Age: 44
End: 2019-11-14
Attending: INTERNAL MEDICINE
Payer: COMMERCIAL

## 2019-11-14 VITALS
HEART RATE: 77 BPM | BODY MASS INDEX: 32.62 KG/M2 | TEMPERATURE: 99 F | HEIGHT: 71 IN | DIASTOLIC BLOOD PRESSURE: 78 MMHG | OXYGEN SATURATION: 97 % | SYSTOLIC BLOOD PRESSURE: 116 MMHG

## 2019-11-14 DIAGNOSIS — M79.2 NERVE PAIN: ICD-10-CM

## 2019-11-14 DIAGNOSIS — M17.10 ARTHRITIS OF KNEE: ICD-10-CM

## 2019-11-14 DIAGNOSIS — Z98.84 HISTORY OF GASTRIC BYPASS: ICD-10-CM

## 2019-11-14 DIAGNOSIS — F11.90 CHRONIC, CONTINUOUS USE OF OPIOIDS: ICD-10-CM

## 2019-11-14 DIAGNOSIS — Z87.11 HISTORY OF GASTRIC ULCER: ICD-10-CM

## 2019-11-14 DIAGNOSIS — M06.9 RHEUMATOID ARTHRITIS INVOLVING MULTIPLE SITES, UNSPECIFIED RHEUMATOID FACTOR PRESENCE: ICD-10-CM

## 2019-11-14 DIAGNOSIS — G89.29 CHRONIC KNEE PAIN, UNSPECIFIED LATERALITY: ICD-10-CM

## 2019-11-14 DIAGNOSIS — M25.569 CHRONIC KNEE PAIN, UNSPECIFIED LATERALITY: ICD-10-CM

## 2019-11-14 DIAGNOSIS — M86.10 OTHER ACUTE OSTEOMYELITIS, UNSPECIFIED SITE (HCC): ICD-10-CM

## 2019-11-14 DIAGNOSIS — S32.592A CLOSED FRACTURE OF RAMUS OF LEFT PUBIS, INITIAL ENCOUNTER (HCC): ICD-10-CM

## 2019-11-14 PROCEDURE — A9576 INJ PROHANCE MULTIPACK: HCPCS | Performed by: INTERNAL MEDICINE

## 2019-11-14 PROCEDURE — 700117 HCHG RX CONTRAST REV CODE 255: Performed by: INTERNAL MEDICINE

## 2019-11-14 PROCEDURE — 72197 MRI PELVIS W/O & W/DYE: CPT

## 2019-11-14 PROCEDURE — 99214 OFFICE O/P EST MOD 30 MIN: CPT | Performed by: PHYSICAL MEDICINE & REHABILITATION

## 2019-11-14 RX ORDER — MORPHINE SULFATE 15 MG/1
15 TABLET ORAL EVERY 8 HOURS PRN
Qty: 90 TAB | Refills: 0 | Status: SHIPPED | OUTPATIENT
Start: 2019-11-14 | End: 2019-12-11

## 2019-11-14 RX ORDER — PREGABALIN 150 MG/1
150 CAPSULE ORAL
Qty: 90 CAP | Refills: 0 | Status: SHIPPED | OUTPATIENT
Start: 2019-12-02 | End: 2020-01-01

## 2019-11-14 RX ORDER — HYDROCODONE BITARTRATE AND ACETAMINOPHEN 10; 325 MG/1; MG/1
1-2 TABLET ORAL EVERY 8 HOURS PRN
Qty: 90 TAB | Refills: 0 | Status: SHIPPED | OUTPATIENT
Start: 2019-11-15 | End: 2019-12-11

## 2019-11-14 RX ORDER — PREGABALIN 150 MG/1
150 CAPSULE ORAL 3 TIMES DAILY
Qty: 90 CAP | Refills: 0 | Status: SHIPPED | OUTPATIENT
Start: 2020-01-01 | End: 2020-01-31

## 2019-11-14 RX ORDER — POTASSIUM CHLORIDE 20 MEQ/1
TABLET, EXTENDED RELEASE ORAL
Status: ON HOLD | COMMUNITY
Start: 2019-11-11 | End: 2020-01-22

## 2019-11-14 RX ADMIN — GADOTERIDOL 20 ML: 279.3 INJECTION, SOLUTION INTRAVENOUS at 14:43

## 2019-11-14 ASSESSMENT — PATIENT HEALTH QUESTIONNAIRE - PHQ9
5. POOR APPETITE OR OVEREATING: 1 - SEVERAL DAYS
CLINICAL INTERPRETATION OF PHQ2 SCORE: 1
SUM OF ALL RESPONSES TO PHQ QUESTIONS 1-9: 4

## 2019-11-14 NOTE — Clinical Note
Also I referred the patient to you for neuro rehab for evaluation.  I will handle all of his controlled pain meds.

## 2019-11-14 NOTE — Clinical Note
Patient has had multiple events including a pubic ramus fracture, infection and osteomyelitis, cardiac event with A. fib RVR requiring defibrillation.  He still has significant functional deficits and I am managing his pain currently.

## 2019-11-14 NOTE — PROGRESS NOTES
[facility]    Subjective:     Chief Complaint   Patient presents with   • Hospital Follow-up     Osteomyelitis of the left pubic bone   • Hospital Follow-up     Abscess of left thigh     This is my first time meeting Mr. Hubbard.  Accompanied by his wife and daughter.    Interval History: 44-year-old male with a history of rheumatoid arthritis on Humira, obesity, chronic neck right knee plan awaiting right total knee arthroplasty and hospitalized on 9/13 for acute blood loss secondary to a upper GI bleed status post blood transfusion.  Hospitalized from 9/27-10/17/2019, admitted due to worsening left thigh pain.  Patient had sustained a fall at a store approximately 1 month PTA.  He was walking with a cane and his cane got caught on the floor mat resulting in a fall.  He did not hit his head; however he landed in a position where he sustained left groin and thigh pain.  He was initially seen at the urgent care in [place] and given a medication, no imaging was performed at that time.  Over the next several weeks, he noted worsening left thigh and leg pain, in addition to needing to rely on his cane more and more.  On presentation, he was afebrile without leukocytosis.  CT of the pelvis revealed a subacute fracture of the left superior pubic ramus near the symphysis and a complex masslike structure concerning for fluid-filled cavity consistent with a hematoma.  Due to increasing leukocytosis, repeat imaging was done, 2 additional complex areas within the left groin were found.  Blood cultures on 10/2 were negative.  CT-guided drainage of the abscesses was performed on 10/2, cultures positive MSSA.  Underwent I&D on 10/9 with Dr. Olivares, thickened periosteum and soft bone was encountered intraoperatively.  OR pathology was consistent with osteomyelitis and left pubis tissue culture was positive MSSA.  While hospitalized, he was also treated for pityriasis versicolor intertrigo.  Deceived 10 days of  fluconazole, followed with miconazole cream.  It was recommended that he remain on the topical therapy for 2 to 4 weeks. Discharged on IV Ancef 2 g every 8 hours x6 weeks, estimated end date 11/19/2019.    Hospital records reviewed    Today, 11/15/2019: Home infusion through [facility].  Patient reports feeling ok and has been tolerating the IV Ancef with minimal adverse effect.  Denies feeling generally ill, fevers/chills, general malaise, headache, n/v/d, abdominal pain or shortness of breath.  States that his lower abdominal surgical site is well-healed without any breakdown or drainage.  He has another follow-up with Dr. Olivares in roughly 3 weeks.  He notes that he has been constipated lately but attributes this to his pain medications.  Was hospitalized recently due to A. fib, noting that he was cardioverted and placed on Xarelto.  He has occasional chest pain off and on, but nothing on a persistent basis.  The rash he had during the hospitalization is completely resolved.  He continues to have left hip/groin/thigh pain that is fairly chronic, ranges anywhere from 3-5/10 described as a sharp achy sensation that is throbbing.  He also has some numbness and tingling while sitting up that goes down to his feet.  Notes that his feet often fall asleep.  He is a follow-up with a surgeon at the beginning of December regarding his right knee replacement.  Unfortunately, he is also having further issues with his right wrist/hand due to being off the Humira and starting to have contractures.  His hand doctor says that he will need surgery, plan will be for this to be done in January, before the right total knee arthroplasty.    Review of Systems   Constitutional: Negative for chills, fever and malaise/fatigue.   HENT: Negative for sore throat.    Respiratory: Negative for shortness of breath.    Cardiovascular: Positive for chest pain (Off-and-on) and leg swelling.   Gastrointestinal: Positive for constipation.  Negative for abdominal pain, diarrhea, nausea and vomiting.   Genitourinary: Negative for dysuria.   Musculoskeletal: Positive for joint pain and myalgias.   Skin: Negative for itching and rash.   Neurological: Positive for tingling, sensory change and weakness. Negative for headaches.   Psychiatric/Behavioral: The patient is not nervous/anxious.        Past Medical History:   Diagnosis Date   • ADD (attention deficit disorder)    • Alcohol abuse    • Allergic rhinitis 2009   • Anemia 2019   • Anxiety 2009   • Back pain 2009   • Bipolar disorder (HCC)    • Bleeding ulcer 2009   • Depression 2009   • Eczema 2009   • History of bleeding ulcers 2015   • Hypertension    • Insomnia 2009   • Migraine 2009   • Obesity, morbid (HCC) 2009   • Pain 2019    Chronic knee and back pain   • Rheumatoid arteritis (HCC) 2019    knee, feet   • Sleep apnea 2009    Did not tolerate cpap, does not use it   • Snoring        Family History   Problem Relation Age of Onset   • Hypertension Mother    • Arthritis Mother    • Depression Mother    • Cancer Father         bladder   • Schizophrenia Father    • Cancer Maternal Grandmother         breast   • Heart Disease Maternal Grandmother    • Psychiatric Illness Other    • Stroke Maternal Aunt    • Other Neg Hx         no connective tissue disorders or known aortic disease       Social History     Tobacco Use   • Smoking status: Current Every Day Smoker     Packs/day: 0.25     Years: 3.00     Pack years: 0.75     Types: Cigarettes, Cigars     Last attempt to quit: 2019     Years since quittin.1   • Smokeless tobacco: Never Used   • Tobacco comment: occasional cigar   Substance Use Topics   • Alcohol use: No     Alcohol/week: 0.0 oz     Comment: quit 2.5 yrs ago   • Drug use: No       Allergies: Benadryl [altaryl]; Nsaids; Phenergan [promethazine hcl]; and Penicillins    Pt's medication and problem list reviewed.      "Objective:     /50 (BP Location: Right arm, Patient Position: Sitting, BP Cuff Size: Adult)   Pulse 76   Temp 37.3 °C (99.2 °F) (Temporal)   Ht 1.803 m (5' 11\")   Wt 99.8 kg (220 lb)   SpO2 95%   BMI 30.68 kg/m²     Physical Exam   Constitutional: He is oriented to person, place, and time and well-developed, well-nourished, and in no distress. No distress.   Obese   HENT:   Head: Normocephalic and atraumatic.   Mouth/Throat: Oropharynx is clear and moist. No oropharyngeal exudate.   Eyes: Pupils are equal, round, and reactive to light. Conjunctivae and EOM are normal.   Neck: Normal range of motion. Neck supple. No tracheal deviation present.   Cardiovascular: Normal rate and regular rhythm.   Murmur heard.  Pulmonary/Chest: Effort normal and breath sounds normal. No respiratory distress. He has no wheezes. He has no rales.   Abdominal: Soft. Bowel sounds are normal. He exhibits no distension. There is no tenderness.   Protuberant    Lower mid abdomen, surgical site-well-healed and approximated without any breakdown, no erythema, no active drainage, nontender to palpation.   Musculoskeletal: Normal range of motion.         General: Edema (Trace BLE) present.      Comments: LUE PICC- CDI, non tender, no erythema.    Left medial thigh-slightly tender to palpation, no induration or fluctuance noted.   Neurological: He is alert and oriented to person, place, and time. No cranial nerve deficit.   Wheelchair   Skin: Skin is warm and dry. No rash noted. He is not diaphoretic. No erythema.   Psychiatric: Mood, memory, affect and judgment normal.   Pleasant   Vitals reviewed.      Labs:  WBC   Date/Time Value Ref Range Status   11/08/2019 04:00 AM 6.0 4.8 - 10.8 K/uL Final   10/19/2010 12:05 PM 8.4 4.0 - 10.5 x10E3/uL Final     RBC   Date/Time Value Ref Range Status   11/08/2019 04:00 AM 3.82 (L) 4.70 - 6.10 M/uL Final   10/19/2010 12:05 PM 4.96 4.20 - 6.00 x10E6/uL Final     Hemoglobin   Date/Time Value Ref " Range Status   11/08/2019 04:00 AM 11.2 (L) 14.0 - 18.0 g/dL Final     Hematocrit   Date/Time Value Ref Range Status   11/08/2019 04:00 AM 36.7 (L) 42.0 - 52.0 % Final     MCV   Date/Time Value Ref Range Status   11/08/2019 04:00 AM 96.1 81.4 - 97.8 fL Final   10/19/2010 12:05 PM 85 80 - 98 fL Final     MCH   Date/Time Value Ref Range Status   11/08/2019 04:00 AM 29.3 27.0 - 33.0 pg Final   10/19/2010 12:05 PM 28.4 27.0 - 34.0 pg Final     MCHC   Date/Time Value Ref Range Status   11/08/2019 04:00 AM 30.5 (L) 33.7 - 35.3 g/dL Final     MPV   Date/Time Value Ref Range Status   11/08/2019 04:00 AM 9.0 9.0 - 12.9 fL Final        Sodium   Date/Time Value Ref Range Status   11/09/2019 04:59  135 - 145 mmol/L Final     Potassium   Date/Time Value Ref Range Status   11/09/2019 04:59 AM 4.2 3.6 - 5.5 mmol/L Final     Chloride   Date/Time Value Ref Range Status   11/09/2019 04:59  96 - 112 mmol/L Final     Co2   Date/Time Value Ref Range Status   11/09/2019 04:59 AM 23 20 - 33 mmol/L Final     Glucose   Date/Time Value Ref Range Status   11/09/2019 04:59 AM 80 65 - 99 mg/dL Final     Bun   Date/Time Value Ref Range Status   11/09/2019 04:59 AM 18 8 - 22 mg/dL Final     Creatinine   Date/Time Value Ref Range Status   11/09/2019 04:59 AM 0.77 0.50 - 1.40 mg/dL Final   10/19/2010 12:05 PM 0.95 0.76 - 1.27 mg/dL Final     Bun-Creatinine Ratio   Date/Time Value Ref Range Status   10/19/2010 12:05 PM 9 8 - 27 Final     Glom Filt Rate, Est   Date/Time Value Ref Range Status   10/19/2010 12:05 PM >59 >59 mL/min/1.73 Final       Alkaline Phosphatase   Date/Time Value Ref Range Status   11/09/2019 04:59 AM 68 30 - 99 U/L Final     AST(SGOT)   Date/Time Value Ref Range Status   11/09/2019 04:59 AM 17 12 - 45 U/L Final     ALT(SGPT)   Date/Time Value Ref Range Status   11/09/2019 04:59 AM 9 2 - 50 U/L Final     Total Bilirubin   Date/Time Value Ref Range Status   11/09/2019 04:59 AM 0.2 0.1 - 1.5 mg/dL Final       Imagin19   MR-PELVIS-WITH & W/O AND SEQUENCES   FINDINGS:  Bilateral zone 1 sacral bandlike decreased T1 and T2 signal with adjacent increased T2 and decreased T1 signal is identified. There is cortical buckling. This affects S1 and S2. No adjacent hematoma. This is likely subacute     Left parasymphyseal decreased T1 and increased T2 signal compatible with subacute fracture is identified. This is partially seen on the comparison and there has been some decrease in the T2 hyperintensity. Adjacent to the superior ramus component of the   fracture is a 23 x 7 x 5 mm T1 and T2 hyperintense process compatible with small hematoma, further supported by a hemosiderin rim     No additional fracture is seen.     No skin breakdown is seen. There is no deep ulceration. No decubitus ulcer is identified.     No greater trochanteric or other bursa     Left hip rotator cuff musculature edema is greatly diminished     No abnormal joint effusion     After contrast is administered there is no abnormal enhancement suspicious for osteomyelitis/decubitus ulcer. There is enhancement along the left parasymphyseal and sacral fractures as expected. Superimposed infection especially in the left   parasymphyseal region cannot be excluded but is not expected given there has been some improvement in the edema in the vicinity.     Assessment and Plan:   The following treatment plan was discussed with patient at length:    1. Other acute osteomyelitis, unspecified site (HCC)  WESTERGREN SED RATE    CRP QUANTITIVE (NON-CARDIAC)    sulfamethoxazole-trimethoprim (BACTRIM DS) 800-160 MG tablet    Comp Metabolic Panel    -Finish IV Ancef on 2019.  DC PICC line after last infusion dose.  -Start p.o. Bactrim DS twice daily, plan to treat for 1 to 3 months.  Discussed dosing and side effects of medications being prescribed.  Pt instructed to call clinic with any adverse effects or to discontinue the medication and go to the ER should  difficulty breathing, SOB, CP, facial swelling and/or gross rash/itching occurs.   -Monitor for s/sx of worsening transitioning from IV to PO abx: increased redness, pain, swelling, drainage, breakdown of surgical site, fevers, chills, general malaise, etc.  Notify ID or go to ER should these s/sx occur.  -ESR and CRP to be done during the next set of labs with dressing change to an inflammatory markers.  -Repeat CMP, ESR and CRP about 7 to 10 days after starting p.o. antibiotics to monitor for hepato-nephrotoxicity, hyperkalemia and trend inflammatory markers.   2. Closed fracture of ramus of left pubis, initial encounter ([facility])      Follow-up with Ortho surgeon as directed.  Antibiotics as above.   3. MSSA (methicillin susceptible Staphylococcus aureus) infection      As above   4. Abscess of left thigh      MRI showing nearly resolved, now 23 x 7 x 5 mm.  Antibiotics as above.   5. Rheumatoid arthritis involving both hands with positive rheumatoid factor ([facility])      Follow-up with rheumatologist as directed.  On Humira (currently on hold-check with rheumatologist before resuming; however, Bactrim and Humira are known to have negative interactions together.  May need to transition patient to p.o. Augmentin or doxycycline if he needs to resume Humira sooner than later.), chronically immunosuppressed on steroids.  At risk for future infections.   6. Chronic pain of right knee      FU with Ortho for future Right TKA   7. Intertrigo      Resolved   8. Atrial fibrillation with rapid ventricular response (HCC)      On Xarelto     Follow up: 2.5 weeks, RTC sooner if needed. FU with PCP for ongoing chronic medical conditions.     JESSICA Alejandra.P.R.N.       Please note that this dictation was created using voice recognition software. I have  worked with technical experts from [employer] to optimize the interface.  I have made every reasonable attempt to correct obvious errors, but there may be errors of grammar  and possibly content that I did not discover before finalizing the note.

## 2019-11-14 NOTE — Clinical Note
This armen was seen in rehab and still has significant functional deficits which are worse after a cardiac event with A. fib RVR requiring defibrillation and is not had physical therapy.  Previously he had a left pubic ramus fracture and osteomyelitis.  Also he has severe arthritis in his right knee which likely needs to be replaced.Do think he can be seen at rehab for another admission to acute rehab?

## 2019-11-15 ENCOUNTER — OFFICE VISIT (OUTPATIENT)
Dept: INFECTIOUS DISEASES | Facility: MEDICAL CENTER | Age: 44
End: 2019-11-15
Payer: COMMERCIAL

## 2019-11-15 VITALS
SYSTOLIC BLOOD PRESSURE: 130 MMHG | TEMPERATURE: 99.2 F | HEIGHT: 71 IN | HEART RATE: 76 BPM | WEIGHT: 220 LBS | BODY MASS INDEX: 30.8 KG/M2 | OXYGEN SATURATION: 95 % | DIASTOLIC BLOOD PRESSURE: 50 MMHG

## 2019-11-15 DIAGNOSIS — M05.742 RHEUMATOID ARTHRITIS INVOLVING BOTH HANDS WITH POSITIVE RHEUMATOID FACTOR (HCC): ICD-10-CM

## 2019-11-15 DIAGNOSIS — S32.592A CLOSED FRACTURE OF RAMUS OF LEFT PUBIS, INITIAL ENCOUNTER (HCC): ICD-10-CM

## 2019-11-15 DIAGNOSIS — M25.561 CHRONIC PAIN OF RIGHT KNEE: ICD-10-CM

## 2019-11-15 DIAGNOSIS — A49.01 MSSA (METHICILLIN SUSCEPTIBLE STAPHYLOCOCCUS AUREUS) INFECTION: ICD-10-CM

## 2019-11-15 DIAGNOSIS — G89.29 CHRONIC PAIN OF RIGHT KNEE: ICD-10-CM

## 2019-11-15 DIAGNOSIS — L02.416 ABSCESS OF LEFT THIGH: ICD-10-CM

## 2019-11-15 DIAGNOSIS — M86.10 OTHER ACUTE OSTEOMYELITIS, UNSPECIFIED SITE (HCC): ICD-10-CM

## 2019-11-15 DIAGNOSIS — M05.741 RHEUMATOID ARTHRITIS INVOLVING BOTH HANDS WITH POSITIVE RHEUMATOID FACTOR (HCC): ICD-10-CM

## 2019-11-15 DIAGNOSIS — L30.4 INTERTRIGO: ICD-10-CM

## 2019-11-15 DIAGNOSIS — I48.91 ATRIAL FIBRILLATION WITH RAPID VENTRICULAR RESPONSE (HCC): ICD-10-CM

## 2019-11-15 PROCEDURE — 99214 OFFICE O/P EST MOD 30 MIN: CPT | Performed by: NURSE PRACTITIONER

## 2019-11-15 RX ORDER — SULFAMETHOXAZOLE AND TRIMETHOPRIM 800; 160 MG/1; MG/1
1 TABLET ORAL 2 TIMES DAILY
Qty: 28 TAB | Refills: 0 | Status: SHIPPED | OUTPATIENT
Start: 2019-11-15 | End: 2019-11-29

## 2019-11-15 ASSESSMENT — ENCOUNTER SYMPTOMS
NERVOUS/ANXIOUS: 0
TINGLING: 1
VOMITING: 0
SHORTNESS OF BREATH: 0
MYALGIAS: 1
SORE THROAT: 0
HEADACHES: 0
FEVER: 0
NAUSEA: 0
WEAKNESS: 1
CHILLS: 0
SENSORY CHANGE: 1
ABDOMINAL PAIN: 0
CONSTIPATION: 1
DIARRHEA: 0

## 2019-11-15 NOTE — PROGRESS NOTES
Follow up patient note  Interventional spine and sports physiatry, Physical medicine rehabilitation      Chief complaint:   Chief Complaint   Patient presents with   • Follow-Up     Bilater Leg pain and Low back pain         HISTORY    Please see new patient note by Dr Gutierrez,  for more details.     HPI  Patient identification: Richard Hubbard II 44 y.o. male  With Diagnoses of Rheumatoid arthritis involving multiple sites, unspecified rheumatoid factor presence ([facility]), Chronic knee pain, unspecified laterality, Arthritis of knee, Chronic, continuous use of opioids, History of gastric bypass, avoid NSAIDs, History of gastric ulcer, avoid NSAIDs, Closed fracture of ramus of left pubis, initial encounter ([facility]), and Nerve pain were pertinent to this visit.       The patient had severe pain with a pelvic fracture and osteomyelitis, on antibiotics for this. He is not able to take his RA meds currently because of the antibiotics. He had atrial fibrillation, requiring defibrillation. He has not been able to get to PT because of that.     Chronic right knee pain, swelling and feels that this is unstable and needs a knee replacement. Reports that the pain is tolerable on his pain meds. In the process of getting a knee replacement when cleared.     He takes lyrica, morphine sulfate 15 mg which she was given 42 tabs for 14 days, Norco 10 given 42 tabs for 14 days.  Both of these are written by Dr. Pisano on 11/2/2019.       I reviewed the discharge summary from records from 11/10/2019.  Patient had A. fib RVR with a heart rate of 190 on admission, defibrillation was done, diltiazem was given, cardiology was consulted.  The patient was cardioverted into sinus rhythm.  I reviewed the procedure note from Dr. Alarcon of cardiology for MIA showing no signs of thrombus.  Cardioversion was done.  I reviewed the discharge summary from Dr. Lalo Pisano from 11/2/2019.  The patient was seen for acute rehab for  osteomyelitis and pelvic fracture.  The patient also had a chronic compression fracture of T11.     ROS Red Flags :   Fever, Chills, Sweats: Denies  Involuntary Weight Loss: Denies  Bowel/Bladder Incontinence: Denies  Saddle Anesthesia: Denies        PMHx:   Past Medical History:   Diagnosis Date   • ADD (attention deficit disorder)    • Alcohol abuse    • Allergic rhinitis 5/21/2009   • Anemia 09/2019   • Anxiety 5/21/2009   • Back pain 5/21/2009   • Bipolar disorder (HCC)    • Bleeding ulcer 5/21/2009   • Depression 5/21/2009   • Eczema 5/21/2009   • History of bleeding ulcers 2/12/2015   • Hypertension    • Insomnia 5/21/2009   • Migraine 5/21/2009   • Obesity, morbid ([facility]) 5/21/2009   • Pain 09/2019    Chronic knee and back pain   • Rheumatoid arteritis ([facility]) 09/2019    knee, feet   • Sleep apnea 5/21/2009    Did not tolerate cpap, does not use it   • Snoring        PSHx:   Past Surgical History:   Procedure Laterality Date   • IRRIGATION & DEBRIDEMENT ORTHO Left 10/9/2019    Procedure: IRRIGATION AND DEBRIDEMENT, WOUND - PELVIC OSTEOMYELITIS;  Surgeon: You Olivares M.D.;  Location: SURGERY [facility];  Service: Orthopedics   • GASTRIC BYPASS LAPAROSCOPIC  2000    Lucila en y   • CHOLECYSTECTOMY  2000       Family history   Family History   Problem Relation Age of Onset   • Hypertension Mother    • Arthritis Mother    • Depression Mother    • Cancer Father         bladder   • Schizophrenia Father    • Cancer Maternal Grandmother         breast   • Heart Disease Maternal Grandmother    • Psychiatric Illness Other    • Stroke Maternal Aunt    • Other Neg Hx         no connective tissue disorders or known aortic disease         Medications:   Outpatient Medications Marked as Taking for the 11/14/19 encounter (Office Visit) with Gerardo Gutierrez M.D.   Medication Sig Dispense Refill   • [START ON 12/2/2019] pregabalin (LYRICA) 150 MG Cap Take 1 Cap by mouth 3 times a day for 30 days. 90 Cap 0   • [START ON  1/1/2020] pregabalin (LYRICA) 150 MG Cap Take 1 Cap by mouth 3 times a day for 30 days. 90 Cap 0   • [START ON 11/15/2019] HYDROcodone/acetaminophen (NORCO)  MG Tab Take 1-2 Tabs by mouth every 8 hours as needed for up to 30 days. 90 Tab 0   • morphine (MS IR) 15 MG tablet Take 1 Tab by mouth every 8 hours as needed for Severe Pain for up to 30 days. 90 Tab 0   • digoxin (LANOXIN) 125 MCG Tab Take 1 Tab by mouth every day at 6 PM. 30 Tab 0   • DILTIAZem CD (CARDIZEM CD) 240 MG CAPSULE SR 24 HR Take 1 Cap by mouth every day. 30 Cap 0   • rivaroxaban (XARELTO) 20 MG Tab tablet Take 1 Tab by mouth with dinner. 30 Tab 11   • predniSONE (DELTASONE) 10 MG Tab Take 10 mg by mouth every day.     • nicotine (NICODERM) 7 MG/24HR PATCH 24 HR Apply 1 Patch to skin as directed every 24 hours.     • furosemide (LASIX) 20 MG Tab Take 1 Tab by mouth 2 times a day. 60 Tab 1   • losartan (COZAAR) 25 MG Tab Take 1 Tab by mouth every day. 30 Tab 2   • morphine ER (MS CONTIN) 15 MG Tab CR tablet Take 1 Tab by mouth 3 times a day for 14 days. 42 Tab 0   • omeprazole (PRILOSEC) 40 MG delayed-release capsule Take 1 Cap by mouth every day. 30 Cap 2   • pregabalin (LYRICA) 150 MG Cap Take 1 Cap by mouth 3 times a day for 30 days. 90 Cap 2   • HYDROcodone/acetaminophen (NORCO)  MG Tab Take 1 Tab by mouth every 6 hours as needed for up to 14 days. 42 Tab 0   • acetaminophen (TYLENOL) 500 MG Tab Take 2 Tabs by mouth 3 times a day. 30 Tab 0   • NS SOLN 100 mL with ceFAZolin 10 GM SOLR 2 g 2 g by Intravenous route every 8 hours for 33 days.          Current Outpatient Medications on File Prior to Visit   Medication Sig Dispense Refill   • digoxin (LANOXIN) 125 MCG Tab Take 1 Tab by mouth every day at 6 PM. 30 Tab 0   • DILTIAZem CD (CARDIZEM CD) 240 MG CAPSULE SR 24 HR Take 1 Cap by mouth every day. 30 Cap 0   • rivaroxaban (XARELTO) 20 MG Tab tablet Take 1 Tab by mouth with dinner. 30 Tab 11   • predniSONE (DELTASONE) 10 MG Tab Take  "10 mg by mouth every day.     • nicotine (NICODERM) 7 MG/24HR PATCH 24 HR Apply 1 Patch to skin as directed every 24 hours.     • furosemide (LASIX) 20 MG Tab Take 1 Tab by mouth 2 times a day. 60 Tab 1   • losartan (COZAAR) 25 MG Tab Take 1 Tab by mouth every day. 30 Tab 2   • morphine ER (MS CONTIN) 15 MG Tab CR tablet Take 1 Tab by mouth 3 times a day for 14 days. 42 Tab 0   • omeprazole (PRILOSEC) 40 MG delayed-release capsule Take 1 Cap by mouth every day. 30 Cap 2   • pregabalin (LYRICA) 150 MG Cap Take 1 Cap by mouth 3 times a day for 30 days. 90 Cap 2   • HYDROcodone/acetaminophen (NORCO)  MG Tab Take 1 Tab by mouth every 6 hours as needed for up to 14 days. 42 Tab 0   • acetaminophen (TYLENOL) 500 MG Tab Take 2 Tabs by mouth 3 times a day. 30 Tab 0   • NS SOLN 100 mL with ceFAZolin 10 GM SOLR 2 g 2 g by Intravenous route every 8 hours for 33 days.     • potassium chloride SA (KDUR) 20 MEQ Tab CR      • vitamin D (VITAMIND D3) 1000 UNIT Tab Take 1 Tab by mouth every day. (Patient not taking: Reported on 11/14/2019) 60 Tab      No current facility-administered medications on file prior to visit.          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   Social Needs   • Financial resource strain: Not on file   • Food insecurity:     Worry: Not on file     Inability: Not on file   • Transportation needs:     Medical: Not on file     Non-medical: Not on file   Tobacco Use   • Smoking status: Current Every Day " "Smoker     Packs/day: 0.25     Years: 3.00     Pack years: 0.75     Types: Cigarettes, Cigars   • Smokeless tobacco: Never Used   • Tobacco comment: occasional cigar   Substance and Sexual Activity   • Alcohol use: No     Alcohol/week: 0.0 oz     Comment: quit 2.5 yrs ago   • Drug use: No   • Sexual activity: Yes     Partners: Female     Comment: ;    Lifestyle   • Physical activity:     Days per week: Not on file     Minutes per session: Not on file   • Stress: Not on file   Relationships   • Social connections:     Talks on phone: Not on file     Gets together: Not on file     Attends Anabaptism service: Not on file     Active member of club or organization: Not on file     Attends meetings of clubs or organizations: Not on file     Relationship status: Not on file   • Intimate partner violence:     Fear of current or ex partner: Not on file     Emotionally abused: Not on file     Physically abused: Not on file     Forced sexual activity: Not on file   Other Topics Concern   •  Service No   • Blood Transfusions Yes   • Caffeine Concern No   • Occupational Exposure No   • Hobby Hazards No   • Sleep Concern No   • Stress Concern Yes   • Weight Concern Yes   • Special Diet No   • Back Care No   • Exercise No   • Bike Helmet No   • Seat Belt Yes   • Self-Exams Yes   Social History Narrative    , lives in [place]         EXAMINATION     Physical Exam:   Vitals: /78 (BP Location: Right arm, Patient Position: Sitting, BP Cuff Size: Adult)   Pulse 77   Temp 37.2 °C (99 °F) (Temporal)   Ht 1.803 m (5' 11\")   SpO2 97%     Constitutional:   Body Habitus: Body mass index is 32.62 kg/m².  Cooperation: Fully cooperates with exam  Appearance: Well-groomed no disheveled    Respiratory-  breathing comfortable on room air, no audible wheezing  Cardiovascular- capillary refills less than 2 seconds. No lower extremity edema is noted.   Psychiatric- alert and oriented ×3. Normal affect.    MSK: -Sensation " is intact in the bilateral lower extremities.          MEDICAL DECISION MAKING    DATA    Labs:   Lab Results   Component Value Date/Time    SODIUM 138 11/09/2019 04:59 AM    POTASSIUM 4.2 11/09/2019 04:59 AM    CHLORIDE 107 11/09/2019 04:59 AM    CO2 23 11/09/2019 04:59 AM    GLUCOSE 80 11/09/2019 04:59 AM    BUN 18 11/09/2019 04:59 AM    CREATININE 0.77 11/09/2019 04:59 AM    CREATININE 0.95 10/19/2010 12:05 PM    BUNCREATRAT 9 10/19/2010 12:05 PM    GLOMRATE >59 10/19/2010 12:05 PM        Lab Results   Component Value Date/Time    PROTHROMBTM 13.6 10/02/2019 08:59 AM    INR 1.03 10/02/2019 08:59 AM        Lab Results   Component Value Date/Time    WBC 6.0 11/08/2019 04:00 AM    WBC 8.4 10/19/2010 12:05 PM    RBC 3.82 (L) 11/08/2019 04:00 AM    RBC 4.96 10/19/2010 12:05 PM    HEMOGLOBIN 11.2 (L) 11/08/2019 04:00 AM    HEMATOCRIT 36.7 (L) 11/08/2019 04:00 AM    MCV 96.1 11/08/2019 04:00 AM    MCV 85 10/19/2010 12:05 PM    MCH 29.3 11/08/2019 04:00 AM    MCH 28.4 10/19/2010 12:05 PM    MCHC 30.5 (L) 11/08/2019 04:00 AM    MPV 9.0 11/08/2019 04:00 AM    NEUTSPOLYS 41.20 (L) 11/08/2019 04:00 AM    LYMPHOCYTES 42.60 (H) 11/08/2019 04:00 AM    MONOCYTES 10.70 11/08/2019 04:00 AM    EOSINOPHILS 3.50 11/08/2019 04:00 AM    BASOPHILS 1.20 11/08/2019 04:00 AM    HYPOCHROMIA 1+ 10/25/2019 04:00 AM    ANISOCYTOSIS 1+ 10/25/2019 04:00 AM        Lab Results   Component Value Date/Time    HBA1C 4.8 10/18/2019 05:18 AM          Imaging:   I personally reviewed following images    MRI pelvis without contrast 10/6/2019 with fracture of the left pubic ramus.    I reviewed the following radiology reports    MRI pelvis with and without contrast 11/14/2019  Interval improvement in the left parasymphyseal centered edema following subacute fracture with improving muscular and marrow signal     Small fluid collection adjacent to the superior pubic ramus likely is a hematoma. Superinfection is not suspected but cannot be excluded on the  basis of imaging alone     Bilateral zone 1 sacral fractures       Results for orders placed during the hospital encounter of 06/07/11   MR-BRAIN-WITH & W/O    Impression 1.  Normal MRI scan of the brain with and without contrast enhancement.                                     Results for orders placed during the hospital encounter of 09/27/19   MR-LUMBAR SPINE-W/O    Impression 1.  Acute bilateral sacral alae fractures.  2.  There is edema in the lower posterior paraspinal soft tissue likely representing soft tissue contusion.  3.  There is no lumbar spine fractures.              Results for orders placed during the hospital encounter of 09/27/19   MR-THORACIC SPINE-W/O    Impression 1.  No acute fracture in the thoracic spine.  2.  Mild chronic compression deformity at T11.        Results for orders placed during the hospital encounter of 09/27/19   MR-LUMBAR SPINE-W/O    Impression 1.  Acute bilateral sacral alae fractures.  2.  There is edema in the lower posterior paraspinal soft tissue likely representing soft tissue contusion.  3.  There is no lumbar spine fractures.                              Results for orders placed during the hospital encounter of 11/14/19   MR-PELVIS-WITH & W/O AND SEQUENCES    Impression Interval improvement in the left parasymphyseal centered edema following subacute fracture with improving muscular and marrow signal    Small fluid collection adjacent to the superior pubic ramus likely is a hematoma. Superinfection is not suspected but cannot be excluded on the basis of imaging alone    Bilateral zone 1 sacral fractures      Results for orders placed during the hospital encounter of 09/27/19   MR-PELVIS W/O    Impression 1.  Limited exam secondary to significant patient motion.  2.  Subacute fracture involving the left pubic bone with osteomyelitis and probable intraosseous abscess.  3.  Extensive edema and enlargement of the muscles of the medial compartment, pectineus muscle, and  pubococcygeus muscle is consistent with known intramuscular abscesses. Fluid collection is most extensive in the adductor longus muscle.  4.  Subacute fractures of the sacrum          Comment: Results given to Dr. Varma through [place] text at 3:56 PM                                                                                                 Results for orders placed during the hospital encounter of 09/27/19   CT-ABDOMEN-PELVIS WITH    Impression 1.  Interval decrease in the intramuscular abscesses involving the left pelvis and thigh following drain placement. Collection in the pubococcygeus muscle is similar in size to the prior exam.    2.  Ventral hernia containing anterior bowel wall and fat. No secondary obstruction.                 Results for orders placed during the hospital encounter of 09/27/19   CT-PELVIS WITH    Impression 1.  Recent but probably subacute fracture of the left superior pubic ramus near the symphysis. Adjacent and just lateral to this are two complex masslike structures, the largest and most medial of which contains a fluid-filled cavity. These are   consistent with hematomata, possibly intramuscular.                  Results for orders placed during the hospital encounter of 08/07/08   DX-ANKLE 3+ VIEWS    Impression IMPRESSION:    NORMAL LEFT ANKLE SERIES.      TRB/[facility]    Read By ASHLEY HUSAIN MD on Aug  7 2008  6:52PM  : [facility] Transcription Date: Aug  8 2008 10:10AM  THIS DOCUMENT HAS BEEN ELECTRONICALLY SIGNED BY: ASHLEY HUSAIN MD on   Aug  8 2008  4:42PM               Results for orders placed during the hospital encounter of 05/07/09   DX-CHEST-2 VIEWS    Impression IMPRESSION:    NORMAL CHEST.     J[facility]/danie          Read By TABITHA SUERO MD on May  7 2009  3:54PM  : [facility] Transcription Date: May  7 2009  4:28PM  THIS DOCUMENT HAS BEEN ELECTRONICALLY SIGNED BY: TABITHA SUERO MD on May    7 2009  4:37PM                     Results for orders placed in  visit on 02/09/16   DX-FOOT-COMPLETE 3+ RIGHT    Impression 1.  Flatfoot deformity.    2.  Mild DJD.        Results for orders placed in visit on 02/09/16   DX-HAND 2- RIGHT    Impression Normal exam.                          Results for orders placed during the hospital encounter of 09/16/19   DX-LUMBAR SPINE-2 OR 3 VIEWS    Impression Dextroconvex lumbar curvature.  No acute osseous abnormality.  No malalignment.                            Results for orders placed during the hospital encounter of 09/16/19   DX-THORACIC SPINE-WITH SWIMMERS VIEW    Impression Mild chronic height loss of the mid thoracic spine from T9 to T11.                   DIAGNOSIS   Visit Diagnoses     ICD-10-CM   1. Rheumatoid arthritis involving multiple sites, unspecified rheumatoid factor presence ([facility]) M06.9   2. Chronic knee pain, unspecified laterality M25.569    G89.29   3. Arthritis of knee M17.10   4. Chronic, continuous use of opioids F11.90   5. History of gastric bypass, avoid NSAIDs Z98.84   6. History of gastric ulcer, avoid NSAIDs Z87.19   7. Closed fracture of ramus of left pubis, initial encounter ([facility]) S32.592A   8. Nerve pain M79.2         ASSESSMENT and PLAN:     Richard Hubbard II 44 y.o. male       was seen today for follow-up.    Diagnoses and all orders for this visit:    Rheumatoid arthritis involving multiple sites, unspecified rheumatoid factor presence ([facility])  -     HYDROcodone/acetaminophen (NORCO)  MG Tab; Take 1-2 Tabs by mouth every 8 hours as needed for up to 30 days.  -     morphine (MS IR) 15 MG tablet; Take 1 Tab by mouth every 8 hours as needed for Severe Pain for up to 30 days.  -     Controlled Substance Treatment Agreement    Chronic knee pain, unspecified laterality  -     HYDROcodone/acetaminophen (NORCO)  MG Tab; Take 1-2 Tabs by mouth every 8 hours as needed for up to 30 days.  -     morphine (MS IR) 15 MG tablet; Take 1 Tab by mouth every 8 hours as needed for Severe Pain  for up to 30 days.    Arthritis of knee  -     HYDROcodone/acetaminophen (NORCO)  MG Tab; Take 1-2 Tabs by mouth every 8 hours as needed for up to 30 days.  -     morphine (MS IR) 15 MG tablet; Take 1 Tab by mouth every 8 hours as needed for Severe Pain for up to 30 days.  -     Controlled Substance Treatment Agreement    Chronic, continuous use of opioids  -     Controlled Substance Treatment Agreement    History of gastric bypass, avoid NSAIDs    History of gastric ulcer, avoid NSAIDs    Closed fracture of ramus of left pubis, initial encounter ([facility])  -     HYDROcodone/acetaminophen (NORCO)  MG Tab; Take 1-2 Tabs by mouth every 8 hours as needed for up to 30 days.  -     morphine (MS IR) 15 MG tablet; Take 1 Tab by mouth every 8 hours as needed for Severe Pain for up to 30 days.  -     Controlled Substance Treatment Agreement    Nerve pain  -     pregabalin (LYRICA) 150 MG Cap; Take 1 Cap by mouth 3 times a day for 30 days.  -     pregabalin (LYRICA) 150 MG Cap; Take 1 Cap by mouth 3 times a day for 30 days.    Other orders  -     Consent for Opiate Prescription      The patient has had a significant functional deficit with a history of rheumatoid arthritis in the process of getting a right knee replacement however he had an acute fracture of his left pubic ramus with infection after the fact.  He is not able to be on his DMARDs for rheumatoid arthritis because of the infection.  He also had a cardiac event with atrial fibrillation requiring defibrillation, followed by cardiology.  I also sent a message to Dr. Pisano of inpatient neuro rehab to see if the patient is a candidate to go back to rehab.  He has not had physical therapy at home after his last cardiac event.    We discussed a tapering strategy for the patient's morphine IR and Norco 10.  Written for 90 tabs of each of these, every 8 hours for the next 30 days.    Continue Lyrica 150 mg 1 cap 3 times daily    Last opioid risk scale:  9/19/2019   Last urine drug screen: 9/19/2019, consistent  Last controlled substance agreement: 9/19/2019    reviewed: 11/14/2019   Naloxone prescribed: yes    Opioid Risk Score: 16    Interpretation of Opioid Risk Score   Score 0-3 = Low risk of abuse. Do UDS at least once per year.  Score 4-7 = Moderate risk of abuse. Do UDS 1-4 times per year.  Score 8+ = High risk of abuse. Refer to specialist.     I reviewed the     In prescribing controlled substances to this patient, I certify that I have obtained and reviewed the medical history of Richard Hubbard II. I have also made a good gabe effort to obtain applicable records from other providers who have treated the patient and records did not demonstrate any increased risk of substance abuse that would prevent me from prescribing controlled substances.     I have conducted a physical exam and documented it. I have reviewed Mr. Hubbard’s prescription history as maintained by the [place] Prescription Monitoring Program.     I have assessed the patient’s risk for abuse, dependency, and addiction using the validated Opioid Risk Tool available at https://www.mdcalc.com/kvamck-pano-wslo-ort-narcotic-abuse.     Given the above, I believe the benefits of controlled substance therapy outweigh the risks. The reasons for prescribing controlled substances include non-narcotic, oral analgesic alternatives have been inadequate for pain control. Accordingly, I have discussed the risk and benefits, treatment plan, and alternative therapies with the patient.               Follow up: 12/11/2019     Thank you for allowing me to participate in the care of this patient. If you have any questions please not hesitate to contact me.        Please note that this dictation was created using voice recognition software. I have made every reasonable attempt to correct obvious errors but there may be errors of grammar and content that I may have overlooked prior to finalization of  this note.      Gerardo Gutierrez MD  Interventional Spine and Sports Physiatry  Physical Medicine and Rehabilitation  [facility]

## 2019-11-16 ENCOUNTER — HOSPITAL ENCOUNTER (OUTPATIENT)
Facility: MEDICAL CENTER | Age: 44
End: 2019-11-16
Attending: NURSE PRACTITIONER
Payer: COMMERCIAL

## 2019-11-16 DIAGNOSIS — M86.10 OTHER ACUTE OSTEOMYELITIS, UNSPECIFIED SITE (HCC): ICD-10-CM

## 2019-11-16 LAB
ALBUMIN SERPL BCP-MCNC: 3.3 G/DL (ref 3.2–4.9)
ALBUMIN/GLOB SERPL: 1.5 G/DL
ALP SERPL-CCNC: 74 U/L (ref 30–99)
ALT SERPL-CCNC: 9 U/L (ref 2–50)
ANION GAP SERPL CALC-SCNC: 8 MMOL/L (ref 0–11.9)
AST SERPL-CCNC: 24 U/L (ref 12–45)
BILIRUB SERPL-MCNC: 0.2 MG/DL (ref 0.1–1.5)
BUN SERPL-MCNC: 13 MG/DL (ref 8–22)
CALCIUM SERPL-MCNC: 8.2 MG/DL (ref 8.5–10.5)
CHLORIDE SERPL-SCNC: 106 MMOL/L (ref 96–112)
CO2 SERPL-SCNC: 27 MMOL/L (ref 20–33)
CREAT SERPL-MCNC: 0.58 MG/DL (ref 0.5–1.4)
CRP SERPL HS-MCNC: 0.43 MG/DL (ref 0–0.75)
ERYTHROCYTE [SEDIMENTATION RATE] IN BLOOD BY WESTERGREN METHOD: 15 MM/HOUR (ref 0–15)
GLOBULIN SER CALC-MCNC: 2.2 G/DL (ref 1.9–3.5)
GLUCOSE SERPL-MCNC: 74 MG/DL (ref 65–99)
POTASSIUM SERPL-SCNC: 4 MMOL/L (ref 3.6–5.5)
PROT SERPL-MCNC: 5.5 G/DL (ref 6–8.2)
SODIUM SERPL-SCNC: 141 MMOL/L (ref 135–145)

## 2019-11-16 PROCEDURE — 86140 C-REACTIVE PROTEIN: CPT

## 2019-11-16 PROCEDURE — 85652 RBC SED RATE AUTOMATED: CPT

## 2019-11-16 PROCEDURE — 80053 COMPREHEN METABOLIC PANEL: CPT

## 2019-11-18 ENCOUNTER — TELEPHONE (OUTPATIENT)
Dept: MEDICAL GROUP | Facility: MEDICAL CENTER | Age: 44
End: 2019-11-18

## 2019-11-18 NOTE — TELEPHONE ENCOUNTER
1. Caller Name:Chilango ([facility])                                          Chilango with [facility] called to say that occupational therapy isn't able to go out until the week of the 18th.

## 2019-11-19 ASSESSMENT — ENCOUNTER SYMPTOMS
MEMORY LOSS: 0
SHORTNESS OF BREATH: 0
FOCAL WEAKNESS: 0
SPEECH CHANGE: 0
FALLS: 0
FEVER: 0
BLURRED VISION: 0
CHILLS: 0
DOUBLE VISION: 0
COUGH: 0
SENSORY CHANGE: 0

## 2019-11-19 NOTE — PROGRESS NOTES
"Prolonged non-face-to-face time was spent on 11/19/2019  from 10:45 AM to 11:19 AM by this reviewer, for a total of 34 minutes.  An extensive review was performed of patient's past records regarding past surgical history relating to current diagnosis admission and discharge note review, op note review, current therapy note review, progress notes, consultant notes. This is in preparation for patient outpatient visit clinic visit in which the patient is new to this writer. This data reviewed and collected is outlined in section titled \"PM&R History to Date\" within my progress note.     Dr. Shaista Guerra DO, MS  Department of Physical Medicine & Rehabilitation  [facility]  [facility]  11/19/2019  11:19 AM    "

## 2019-11-19 NOTE — PROGRESS NOTES
[facility]  PM[facility] Neuro Rehabilitation [facility]  [facility] [place] [address]  Ph: [phone]    NEW PATIENT EVALUATION    *Patient established in PM&R practice, however, patient new to writer as patient is transferring care. Therefore, patient billed as established.     Patient Name: Richard Hubbard II   Patient : 1975  Patient Age: 44 y.o.   PCP: MARTÍN Turner    Referring Physician: Dr. Loi Pisano, Dr. Gerardo Gutierrez  Reason for Referral: ARU discharge follow-up, transfer of care  Examining Physician: Dr. Shiasta Guerra DO  Date of Service: 2019      SUBJECTIVE:   Patient Identification: Richard Hubbard II is a 44 y.o. RHD male with PMH significant for rheumatoid arthritis on Humira, MONICA, depression, iron deficiency anemia, obesity, chronic right knee pain awaiting TKA, GI bleed (2019) and rehabilitation history significant for debility secondary to left pelvic fracture and left pelvic MSSA osteomyelitis s/p surgical debridement 10/9/2019 in setting of immunocompromise due to rheumatologic medications and is presenting to PM&R clinic for a NEW OUTPATIENT evaluation with the following chief complaint/s:    Chief Complaint: Right knee pain, not walking, right hand pain    PM&R History to Date - Background Information:  Original Date of Injury: 2019  Pertinent Procedure History: Pelvic surgical debridement 10/9/2019  Dates of Admission/Discharge to ARU: 10/17/2019 to 2019  Details of Hospitalization: Per DC summary written by Dr. Loi Pisano 2018, personally reviewed:  -Ortho: Follow-up orthopedics  -Pain: Discharged with morphine 15 mg twice daily, Lyrica 150 mg 3 times daily, scheduled Tylenol, PRN Norco 10 mg  -ID: Ancef to continue until 2019 with infectious disease follow-up  -RA: Previously on DMARD, currently on hold.  Discharged on prednisone 40 mg daily tapered to 10 mg for 7 days at discharge.  -Tobacco abuse:  Nicotine patch discontinued on discharge.  -GERD: History of PUD and GI bleed, requiring transfusion on omeprazole  -Vitamin D deficiency 2000 units for level of 20.     Emergency department notes reviewed: Since rehab discharge, patient was seen in the emergency department 11/5/2019 for palpitations, dizziness, shortness of breath.  Diagnosed with new onset atrial fibrillation.  Suspected due to combination of anemia, active infection and underlying rheumatoid arthritis.  Patient started on anticoagulation and cardioverted 11/8/2019.  Has outpatient follow-up with electrophysiology for EP study, discharged on digoxin 125 mcg, diltiazem 240 mg, and Xarelto given patient is status post cardioversion.     Patient seen by Dr. Gerardo Gutierrez, PM&R, 11/14/2019 -narcotics refilled (Norco, morphine, Lyrica).  Taper strategy discussed.  Patient referred to outpatient neuro rehabilitation PM&R for evaluation for consideration of additional acute rehab admission.     Infectious disease notes reviewed from 11/15/2019: Ancef to be completed 11/19/2019 with removal of PICC line and transition to p.o. Bactrim for 1 to 3 months.    Therapy: [facility]; discharged with PT/OT/RN  -Physical Therapy to evaluate patient week of November 18.  -Discharged with walker with bilateral platform attachments    Ortho evaluation PENDING      Accompanied by Today: Wife, Katiana (pronounced Gerardo)  History of Present Illness:   - PT eval and cautious to get up on platform walker due to right knee pain. Is fall risk.   - Concurrently has had suspected tendon rupture of right finger extensors per [facility] MD evaluation, Dr. Bell. Hoping for fixation in Jan after clearance from other specialists.   - Has had tendon rupture in past.   - Off prednisone.   - On Xarelto with PPI.   - New appnt with Ortho beginning Dec for eval of TKA, right.   - Mild constipation, on Colace PRN. Will need occasional extra meds. On opiates.   - Hasn't been able to be  very mobile due to all other concurrent issues.    - PICC to come out soon.     Review of Systems:  Review of Systems   Constitutional: Negative for chills and fever.   HENT: Negative for hearing loss and tinnitus.    Eyes: Negative for blurred vision and double vision.   Respiratory: Negative for cough and shortness of breath.    Cardiovascular: Positive for leg swelling ( chronic). Negative for chest pain.   Gastrointestinal: Positive for constipation.   Genitourinary: Negative for dysuria, frequency and urgency.   Musculoskeletal: Positive for joint pain ( right knee pain). Negative for falls.   Skin: Negative for itching and rash.   Neurological: Negative for sensory change, speech change and focal weakness.   Psychiatric/Behavioral: Negative for memory loss.      All other pertinent positive review of systems are noted above in HPI.   All other systems reviewed and are negative.    Past Medical History:  Past Medical History:   Diagnosis Date   • Rheumatoid arteritis (HCC) 09/2019    knee, feet   • Anemia 09/2019   • Pain 09/2019    Chronic knee and back pain   • History of bleeding ulcers 2/12/2015   • Bleeding ulcer 5/21/2009   • Obesity, morbid (HCC) 5/21/2009   • Anxiety 5/21/2009   • Insomnia 5/21/2009   • Allergic rhinitis 5/21/2009   • Eczema 5/21/2009   • Depression 5/21/2009   • Back pain 5/21/2009   • Migraine 5/21/2009   • Sleep apnea 5/21/2009    Did not tolerate cpap, does not use it   • ADD (attention deficit disorder)    • Alcohol abuse    • Bipolar disorder (HCC)    • Hypertension    • Snoring       Past Surgical History:   Procedure Laterality Date   • IRRIGATION & DEBRIDEMENT ORTHO Left 10/9/2019    Procedure: IRRIGATION AND DEBRIDEMENT, WOUND - PELVIC OSTEOMYELITIS;  Surgeon: You Olivares M.D.;  Location: SURGERY [facility];  Service: Orthopedics   • GASTRIC BYPASS LAPAROSCOPIC  2000    Lucila en y   • CHOLECYSTECTOMY  2000        Current Outpatient Medications:   •  Cholecalciferol  2000 UNIT Cap, Take 1 Cap by mouth every day., Disp: 30 Cap, Rfl: 5  •  sulfamethoxazole-trimethoprim (BACTRIM DS) 800-160 MG tablet, Take 1 Tab by mouth 2 times a day for 14 days., Disp: 28 Tab, Rfl: 0  •  potassium chloride SA (KDUR) 20 MEQ Tab CR, , Disp: , Rfl:   •  HYDROcodone/acetaminophen (NORCO)  MG Tab, Take 1-2 Tabs by mouth every 8 hours as needed for up to 30 days., Disp: 90 Tab, Rfl: 0  •  morphine (MS IR) 15 MG tablet, Take 1 Tab by mouth every 8 hours as needed for Severe Pain for up to 30 days., Disp: 90 Tab, Rfl: 0  •  digoxin (LANOXIN) 125 MCG Tab, Take 1 Tab by mouth every day at 6 PM., Disp: 30 Tab, Rfl: 0  •  DILTIAZem CD (CARDIZEM CD) 240 MG CAPSULE SR 24 HR, Take 1 Cap by mouth every day., Disp: 30 Cap, Rfl: 0  •  rivaroxaban (XARELTO) 20 MG Tab tablet, Take 1 Tab by mouth with dinner., Disp: 30 Tab, Rfl: 11  •  predniSONE (DELTASONE) 10 MG Tab, Take 10 mg by mouth every day., Disp: , Rfl:   •  nicotine (NICODERM) 7 MG/24HR PATCH 24 HR, Apply 1 Patch to skin as directed every 24 hours., Disp: , Rfl:   •  furosemide (LASIX) 20 MG Tab, Take 1 Tab by mouth 2 times a day., Disp: 60 Tab, Rfl: 1  •  losartan (COZAAR) 25 MG Tab, Take 1 Tab by mouth every day., Disp: 30 Tab, Rfl: 2  •  omeprazole (PRILOSEC) 40 MG delayed-release capsule, Take 1 Cap by mouth every day., Disp: 30 Cap, Rfl: 2  •  pregabalin (LYRICA) 150 MG Cap, Take 1 Cap by mouth 3 times a day for 30 days., Disp: 90 Cap, Rfl: 2  •  acetaminophen (TYLENOL) 500 MG Tab, Take 2 Tabs by mouth 3 times a day., Disp: 30 Tab, Rfl: 0  •  pregabalin (LYRICA) 150 MG Cap, TK 1 C PO TID FOR 30 DAYS, Disp: , Rfl: 2  •  pregabalin (LYRICA) 150 MG Cap, , Disp: , Rfl:   •  [START ON 12/2/2019] pregabalin (LYRICA) 150 MG Cap, Take 1 Cap by mouth 3 times a day for 30 days. (Patient not taking: Reported on 11/20/2019), Disp: 90 Cap, Rfl: 0  •  [START ON 1/1/2020] pregabalin (LYRICA) 150 MG Cap, Take 1 Cap by mouth 3 times a day for 30 days. 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   Social Needs   • Financial resource strain: Not on file   • Food insecurity:     Worry: Not on file     Inability: Not on file   • Transportation needs:     Medical: Not on file     Non-medical: Not on file   Tobacco Use   • Smoking status: Current Every Day Smoker     Packs/day: 0.25     Years: 3.00     Pack years: 0.75     Types: Cigarettes, Cigars     Last attempt to quit: 2019     Years since quittin.1   • Smokeless tobacco: Never Used   • Tobacco comment: occasional cigar   Substance and Sexual Activity   • Alcohol use: No     Alcohol/week: 0.0 oz     Comment: quit 2.5 yrs ago   • Drug use: No   • Sexual activity: Yes     Partners: Female     Comment: ;    Lifestyle   • Physical activity:     Days per week: Not on file     Minutes per session: Not on file   • Stress: Not on file   Relationships   • Social connections:     Talks on phone: Not on file     Gets together: Not on file     Attends Yarsani service: Not on file     Active member of club or organization: Not on file     Attends meetings of clubs or organizations: Not on file     Relationship status: Not on file   • Intimate partner violence:     Fear of current or ex partner: Not on file     Emotionally abused: Not on file     Physically abused: Not on file     Forced sexual activity: Not on file   Other Topics Concern   •  " Service No   • Blood Transfusions Yes   • Caffeine Concern No   • Occupational Exposure No   • Hobby Hazards No   • Sleep Concern No   • Stress Concern Yes   • Weight Concern Yes   • Special Diet No   • Back Care No   • Exercise No   • Bike Helmet No   • Seat Belt Yes   • Self-Exams Yes   Social History Narrative    , lives in [place]        Family History:  Family History   Problem Relation Age of Onset   • Hypertension Mother    • Arthritis Mother    • Depression Mother    • Cancer Father         bladder   • Schizophrenia Father    • Cancer Maternal Grandmother         breast   • Heart Disease Maternal Grandmother    • Psychiatric Illness Other    • Stroke Maternal Aunt    • Other Neg Hx         no connective tissue disorders or known aortic disease       Depression and Opioid Screening  PHQ-9:  Depression Screen (PHQ-2/PHQ-9) 11/7/2019 11/14/2019 11/20/2019   PHQ-2 Total Score 0 - -   PHQ-2 Total Score - - -   PHQ-2 Total Score - 1 0   PHQ-9 Total Score - - -   PHQ-9 Total Score - 4 -     Interpretation of PHQ-9 Total Score   Score Severity   1-4 No Depression   5-9 Mild Depression   10-14 Moderate Depression   15-19 Moderately Severe Depression   20-27 Severe Depression     Opioid Risk Score: 16  Interpretation of Opioid Risk Score   Score 0-3 = Low risk of abuse. Do UDS at least once per year.  Score 4-7 = Moderate risk of abuse. Do UDS 1-4 times per year.  Score 8+ = High risk of abuse. Refer to specialist.      OBJECTIVE:   Vital Signs:  Vitals:    11/20/19 1250   BP: 102/68   Pulse: 76   Temp: 36.2 °C (97.1 °F)   SpO2: 96%        Pertinent Labs:  Lab Results   Component Value Date/Time    SODIUM 141 11/16/2019 10:30 AM    POTASSIUM 4.0 11/16/2019 10:30 AM    CHLORIDE 106 11/16/2019 10:30 AM    CO2 27 11/16/2019 10:30 AM    GLUCOSE 74 11/16/2019 10:30 AM    BUN 13 11/16/2019 10:30 AM    CREATININE 0.58 11/16/2019 10:30 AM    CREATININE 0.95 10/19/2010 12:05 PM    BUNCREATRAT 9 10/19/2010 12:05 PM     GLOMRATE >59 10/19/2010 12:05 PM       Lab Results   Component Value Date/Time    HBA1C 4.8 10/18/2019 05:18 AM       Lab Results   Component Value Date/Time    WBC 6.0 11/08/2019 04:00 AM    WBC 8.4 10/19/2010 12:05 PM    RBC 3.82 (L) 11/08/2019 04:00 AM    RBC 4.96 10/19/2010 12:05 PM    HEMOGLOBIN 11.2 (L) 11/08/2019 04:00 AM    HEMATOCRIT 36.7 (L) 11/08/2019 04:00 AM    MCV 96.1 11/08/2019 04:00 AM    MCV 85 10/19/2010 12:05 PM    MCH 29.3 11/08/2019 04:00 AM    MCH 28.4 10/19/2010 12:05 PM    MCHC 30.5 (L) 11/08/2019 04:00 AM    MPV 9.0 11/08/2019 04:00 AM    NEUTSPOLYS 41.20 (L) 11/08/2019 04:00 AM    LYMPHOCYTES 42.60 (H) 11/08/2019 04:00 AM    MONOCYTES 10.70 11/08/2019 04:00 AM    EOSINOPHILS 3.50 11/08/2019 04:00 AM    BASOPHILS 1.20 11/08/2019 04:00 AM    HYPOCHROMIA 1+ 10/25/2019 04:00 AM    ANISOCYTOSIS 1+ 10/25/2019 04:00 AM       Lab Results   Component Value Date/Time    ASTSGOT 24 11/16/2019 10:30 AM    ALTSGPT 9 11/16/2019 10:30 AM        Physical Exam:   Physical Exam   Constitutional: He is oriented to person, place, and time and well-developed, well-nourished, and in no distress.   Presents with wife and daughter.  In manual wheelchair currently.   HENT:   Head: Normocephalic and atraumatic.   Eyes: Conjunctivae are normal. No scleral icterus. Right eye exhibits no nystagmus. Left eye exhibits no nystagmus.   Neck:   Range of motion appears normal in all planes   Cardiovascular:   Lower extremities without peripheral edema.  Extremities warm and well-perfused.   Pulmonary/Chest: No accessory muscle usage. No respiratory distress.   Abdominal: Soft. Normal appearance. He exhibits no distension.   Musculoskeletal: Normal range of motion.         General: Edema ( Bilateral, pitting and feet but nonpitting in lower extremity bilaterally.) present.   Neurological: He is alert and oriented to person, place, and time. No cranial nerve deficit. He exhibits normal muscle tone. Coordination normal.    Strength in bilateral upper extremities grossly 5/5.    Skin: Skin is warm, dry and intact.   Psychiatric: Mood and affect normal.   Nursing note and vitals reviewed.     Imaging:   CT Pelvis 9/28/19  IMPRESSION:  1.  Recent but probably subacute fracture of the left superior pubic ramus near the symphysis. Adjacent and just lateral to this are two complex masslike structures, the largest and most medial of which contains a fluid-filled cavity. These are   consistent with hematomata, possibly intramuscular.     MR-Pelvis 11/14/19   IMPRESSION:  Interval improvement in the left parasymphyseal centered edema following subacute fracture with improving muscular and marrow signal  Small fluid collection adjacent to the superior pubic ramus likely is a hematoma. Superinfection is not suspected but cannot be excluded on the basis of imaging alone  Bilateral zone 1 sacral fractures    ASSESSMENT/PLAN: Richard Hubbard II  is a 44 y.o. male with rehabilitation history significant for debility secondary to left pelvic fracture and left pelvic MSSA osteomyelitis s/p surgical debridement 10/9/2019 in setting of immunocompromise due to rheumatologic medications, here 11/19/2019 for ARU discharge follow-up and referral from PM&R spine and pain clinic. The following plan was discussed with the patient who is in agreement.     Visit Diagnoses     ICD-10-CM   1. Debility R53.81   2. Paroxysmal atrial fibrillation ([facility]) I48.0   3. Chronic pain of right knee M25.561    G89.29   4. Other acute osteomyelitis, unspecified site ([facility]) M86.10   5. Closed fracture of ramus of left pubis, initial encounter ([facility]) S32.592A   6. Neuropathic pain M79.2   7. Vitamin D deficiency E55.9        Rehab/Neuro:   1. Debility secondary to left pelvic fracture and left pelvic MSSA osteomyelitis s/p surgical debridement 10/9/2019 in setting of immunocompromise due to rheumatologic medications: Reviewed all pertinent previous medical records leading  "up to today's clinic visit. Initial imaging reports reviewed.  -Patient has home health physical therapy however they are doing minimal interventions given continued chronic right knee pain.  -PICC line to come out soon.  Patient to contact our office should he need additional help getting order for PEG removal once cleared by infectious disease.  -We will follow-up with rheumatology regarding DMARD.    Regarding need for recurrent ARU admission, patient has several concomitant and unresolved issues which need to be addressed prior to re-referral to ARU.  He certainly meets requirements from medical need, therapy needed perspective, however, unlikely to progress as well as possible due to underlying need for total knee arthroplasty on the right and tendon repair right hand.  Patient to see orthopedics regarding both of these early December.  Dr. Bell, [facility], hoping for tendon repair in January.  Will follow up plan for right TKA.  Patient would absolutely benefit from readmission to acute rehab unit once these orthopedic issues are addressed.  I will plan to see the patient back in a few months to reevaluate the plan for upcoming surgical intervention and continued need for readmission to acute rehab unit.  Advised the patient to continue working with physical therapy as much as possible within reasonable safety limits now to prevent further debility.    CV:  1.  New onset atrial fibrillation 11/2019 after ARU discharge status post cardioversion and on temporary anticoagulation (Xarelto).  -Followed by cardiology  -Continue Xarelto, PPI    Neuropathic Pain:   1. Secondary to #1 in \"rehab/neuro\" section: Currently taking Lyrica 150 mg 3 times daily.  -Continue taking Lyrica 150 mg 3 times daily, ordered by Dr. Gutierrez, patient does not need additional refill at this time.    Endo:   1. Vitamin D Deficiency secondary to #1 in \"rehab/neuro\" section: Most recent Vitamin D lab reviewed from 10/17/2019 and was 20.  No " longer taking vitamin D supplementation, had been on cholecalciferol 1000 units daily.    Nociceptive Pain/Ortho:   1.  Chronic pain secondary to multiple orthopedic issues, most notably patient needing right TKA  -PM&R spine/pain clinic has patient on taper plan for narcotics (morphine, Norco); continue follow up with Dr. Gutierrez for opioids.   -Orthopedic evaluation PENDING early December.    Bowel:  1.  Constipation secondary to opioid use.  -Increase Colace to 200 mg twice daily.  -Obtain senna over-the-counter and take 1-2 tabs daily.  -Okay to use milk of magnesia as needed as well if this works better for patient.    Vitamin D deficiency: PRESCRIPTION sent to pharmacy for cholecalciferol 2000 units daily.  Last labs reviewed and vitamin D was 20.     Follow up: 3-4 months     Total time spent face to face with patient was 36 minutes. Greater then 50% of my visit was spent on counseling and coordination of care regarding the primary medical diagnosis and secondary medical complications as aforementioned in the assessment and plan. Extensive discussion involved the patient and wife, Katiana.    Please note that this dictation was created using voice recognition software. I have made every reasonable attempt to correct obvious errors but there may be errors of grammar and content that I may have overlooked prior to finalization of this note.    Dr. Shaista Guerra DO, MS  Department of Physical Medicine & Rehabilitation  Neuro Rehabilitation Clinic  [facility]  11/19/2019 10:40 AM

## 2019-11-20 ENCOUNTER — OFFICE VISIT (OUTPATIENT)
Dept: CARDIOLOGY | Facility: MEDICAL CENTER | Age: 44
End: 2019-11-20
Payer: COMMERCIAL

## 2019-11-20 ENCOUNTER — OFFICE VISIT (OUTPATIENT)
Dept: MEDICAL GROUP | Facility: MEDICAL CENTER | Age: 44
End: 2019-11-20
Payer: COMMERCIAL

## 2019-11-20 ENCOUNTER — OFFICE VISIT (OUTPATIENT)
Dept: PHYSICAL MEDICINE AND REHAB | Facility: REHABILITATION | Age: 44
End: 2019-11-20
Payer: COMMERCIAL

## 2019-11-20 VITALS
RESPIRATION RATE: 18 BRPM | BODY MASS INDEX: 37.38 KG/M2 | WEIGHT: 267 LBS | HEIGHT: 71 IN | DIASTOLIC BLOOD PRESSURE: 80 MMHG | HEART RATE: 80 BPM | TEMPERATURE: 98.1 F | SYSTOLIC BLOOD PRESSURE: 144 MMHG | OXYGEN SATURATION: 98 %

## 2019-11-20 VITALS
HEART RATE: 76 BPM | OXYGEN SATURATION: 96 % | TEMPERATURE: 97.1 F | WEIGHT: 266 LBS | SYSTOLIC BLOOD PRESSURE: 102 MMHG | BODY MASS INDEX: 37.24 KG/M2 | HEIGHT: 71 IN | DIASTOLIC BLOOD PRESSURE: 68 MMHG

## 2019-11-20 VITALS
HEIGHT: 71 IN | SYSTOLIC BLOOD PRESSURE: 112 MMHG | WEIGHT: 264 LBS | OXYGEN SATURATION: 97 % | DIASTOLIC BLOOD PRESSURE: 60 MMHG | HEART RATE: 68 BPM | BODY MASS INDEX: 36.96 KG/M2

## 2019-11-20 DIAGNOSIS — R53.81 DEBILITY: Primary | ICD-10-CM

## 2019-11-20 DIAGNOSIS — E55.9 VITAMIN D DEFICIENCY: ICD-10-CM

## 2019-11-20 DIAGNOSIS — M79.2 NEUROPATHIC PAIN: ICD-10-CM

## 2019-11-20 DIAGNOSIS — S32.592A CLOSED FRACTURE OF RAMUS OF LEFT PUBIS, INITIAL ENCOUNTER (HCC): ICD-10-CM

## 2019-11-20 DIAGNOSIS — M05.742 RHEUMATOID ARTHRITIS INVOLVING BOTH HANDS WITH POSITIVE RHEUMATOID FACTOR (HCC): Chronic | ICD-10-CM

## 2019-11-20 DIAGNOSIS — M25.561 CHRONIC PAIN OF RIGHT KNEE: Chronic | ICD-10-CM

## 2019-11-20 DIAGNOSIS — R60.0 LOCALIZED EDEMA: ICD-10-CM

## 2019-11-20 DIAGNOSIS — G89.29 CHRONIC PAIN OF RIGHT KNEE: Chronic | ICD-10-CM

## 2019-11-20 DIAGNOSIS — M86.10 OTHER ACUTE OSTEOMYELITIS, UNSPECIFIED SITE (HCC): ICD-10-CM

## 2019-11-20 DIAGNOSIS — I48.91 ATRIAL FIBRILLATION WITH RAPID VENTRICULAR RESPONSE (HCC): ICD-10-CM

## 2019-11-20 DIAGNOSIS — I48.0 PAROXYSMAL ATRIAL FIBRILLATION (HCC): ICD-10-CM

## 2019-11-20 DIAGNOSIS — M05.741 RHEUMATOID ARTHRITIS INVOLVING BOTH HANDS WITH POSITIVE RHEUMATOID FACTOR (HCC): Chronic | ICD-10-CM

## 2019-11-20 DIAGNOSIS — I48.91 ATRIAL FIBRILLATION WITH RVR (HCC): Primary | ICD-10-CM

## 2019-11-20 DIAGNOSIS — R53.81 PHYSICAL DEBILITY: ICD-10-CM

## 2019-11-20 LAB — EKG IMPRESSION: NORMAL

## 2019-11-20 PROCEDURE — 99358 PROLONG SERVICE W/O CONTACT: CPT | Performed by: PHYSICAL MEDICINE & REHABILITATION

## 2019-11-20 PROCEDURE — 99214 OFFICE O/P EST MOD 30 MIN: CPT | Performed by: NURSE PRACTITIONER

## 2019-11-20 PROCEDURE — 99214 OFFICE O/P EST MOD 30 MIN: CPT | Performed by: PHYSICAL MEDICINE & REHABILITATION

## 2019-11-20 PROCEDURE — 93000 ELECTROCARDIOGRAM COMPLETE: CPT | Performed by: INTERNAL MEDICINE

## 2019-11-20 PROCEDURE — 99204 OFFICE O/P NEW MOD 45 MIN: CPT | Performed by: INTERNAL MEDICINE

## 2019-11-20 RX ORDER — PREGABALIN 150 MG/1
CAPSULE ORAL
Refills: 2 | COMMUNITY
Start: 2019-11-02 | End: 2019-12-11

## 2019-11-20 RX ORDER — PREGABALIN 150 MG/1
CAPSULE ORAL
COMMUNITY
Start: 2019-11-02 | End: 2019-12-11

## 2019-11-20 ASSESSMENT — PATIENT HEALTH QUESTIONNAIRE - PHQ9: CLINICAL INTERPRETATION OF PHQ2 SCORE: 0

## 2019-11-20 ASSESSMENT — ENCOUNTER SYMPTOMS: CONSTIPATION: 1

## 2019-11-20 NOTE — PROGRESS NOTES
"cc: Follow-up hospital visit      Subjective:     HPI:     Richard Hubbard II is a 44 y.o. male here to discuss the evaluation and management of:    Patient is here today with his wife.    Osteomyelitis  Patient was recently hospitalized for a groin abscess and pelvic osteomyelitis.  His initial injury was in early September when he had slipped and fell.  He had worsening left leg and groin pain.  Patient had been scheduled for a right total knee replacement at that time.  He subsequently continued to have increasing pain in his left groin and leg.  CT scan showed a left pubic ramus fracture with 2 large hematomas. He had underwent a CT-guided drainage on 10/2 with cultures growing MSSA.  He had surgical debridement on 10/9, pathology consistent with osteomyelitis.  He was on IV antibiotics with a stop date of 11/19/2019.   Last dose of IV antibiotics has been completed.  He reports that he did follow up with infectious disease last week.  Hopeful the PICC line will come out soon.  Currently has home health at this time.     Rheumatoid arthritis  Not on humira now-will follow up with Endocrinology.  Humira was put on hold due to immunosuppression.      New onset A. fib  Recent new onset of A. fib after discharge from hospital.  He was cardioverted.  Now taking digoxin and Cardizem.  His echo showed 55% of his ejection fraction.  MIA without atrial thrombi.  He states that sometimes when he eats he \"something\" feels weird every now and then.  States it is hard to describe.  Denies any syncopal episodes or chest pain at this time.Will have cardiology appointment today.      Leg swelling  Taking lasix daily-still has leg swelling. Started back on it in the last week.  Patient states that while in the hospital his leg swelling was significantly better.        ROS:  Denies any Headache, Blurred Vision, Confusion, Chest pain,  Shortness of breath,  Abdominal pain, Changes of bowel or bladder, Lower ext edema, " Fevers, Nights sweats, Weight Changes, Focal weakness or numbness.  And all other systems are negative. Leg swelling        Current Outpatient Medications:   •  potassium chloride SA (KDUR) 20 MEQ Tab CR, , Disp: , Rfl:   •  HYDROcodone/acetaminophen (NORCO)  MG Tab, Take 1-2 Tabs by mouth every 8 hours as needed for up to 30 days., Disp: 90 Tab, Rfl: 0  •  morphine (MS IR) 15 MG tablet, Take 1 Tab by mouth every 8 hours as needed for Severe Pain for up to 30 days., Disp: 90 Tab, Rfl: 0  •  digoxin (LANOXIN) 125 MCG Tab, Take 1 Tab by mouth every day at 6 PM., Disp: 30 Tab, Rfl: 0  •  DILTIAZem CD (CARDIZEM CD) 240 MG CAPSULE SR 24 HR, Take 1 Cap by mouth every day., Disp: 30 Cap, Rfl: 0  •  rivaroxaban (XARELTO) 20 MG Tab tablet, Take 1 Tab by mouth with dinner., Disp: 30 Tab, Rfl: 11  •  nicotine (NICODERM) 7 MG/24HR PATCH 24 HR, Apply 1 Patch to skin as directed every 24 hours., Disp: , Rfl:   •  furosemide (LASIX) 20 MG Tab, Take 1 Tab by mouth 2 times a day., Disp: 60 Tab, Rfl: 1  •  losartan (COZAAR) 25 MG Tab, Take 1 Tab by mouth every day., Disp: 30 Tab, Rfl: 2  •  omeprazole (PRILOSEC) 40 MG delayed-release capsule, Take 1 Cap by mouth every day., Disp: 30 Cap, Rfl: 2  •  acetaminophen (TYLENOL) 500 MG Tab, Take 2 Tabs by mouth 3 times a day., Disp: 30 Tab, Rfl: 0  •  pregabalin (LYRICA) 150 MG Cap, TK 1 C PO TID FOR 30 DAYS, Disp: , Rfl: 2  •  pregabalin (LYRICA) 150 MG Cap, , Disp: , Rfl:   •  Cholecalciferol 2000 UNIT Cap, Take 1 Cap by mouth every day., Disp: 30 Cap, Rfl: 5  •  pregabalin (LYRICA) 150 MG Cap, Take 1 Cap by mouth 3 times a day for 30 days. (Patient not taking: Reported on 11/20/2019), Disp: 90 Cap, Rfl: 0  •  [START ON 1/1/2020] pregabalin (LYRICA) 150 MG Cap, Take 1 Cap by mouth 3 times a day for 30 days. (Patient not taking: Reported on 11/20/2019), Disp: 90 Cap, Rfl: 0  •  predniSONE (DELTASONE) 10 MG Tab, Take 10 mg by mouth every day., Disp: , Rfl:     Allergies   Allergen  "Reactions   • Benadryl [Altaryl]      \"I get agitated\"   • Nsaids      Bleeding, \"I had a bleeding ulcer\"   • Phenergan [Promethazine Hcl]      \"I get very agitated\"   • Penicillins      \"Had some dizziness\"  Tolerated Unasyn 10/2019       Past Medical History:   Diagnosis Date   • ADD (attention deficit disorder)    • Alcohol abuse    • Allergic rhinitis 5/21/2009   • Anemia 09/2019   • Anxiety 5/21/2009   • Back pain 5/21/2009   • Bipolar disorder (HCC)    • Bleeding ulcer 5/21/2009   • Depression 5/21/2009   • Eczema 5/21/2009   • History of bleeding ulcers 2/12/2015   • Hypertension    • Insomnia 5/21/2009   • Migraine 5/21/2009   • Obesity, morbid ([facility]) 5/21/2009   • Pain 09/2019    Chronic knee and back pain   • Rheumatoid arteritis ([facility]) 09/2019    knee, feet   • Sleep apnea 5/21/2009    Did not tolerate cpap, does not use it   • Snoring      Past Surgical History:   Procedure Laterality Date   • IRRIGATION & DEBRIDEMENT ORTHO Left 10/9/2019    Procedure: IRRIGATION AND DEBRIDEMENT, WOUND - PELVIC OSTEOMYELITIS;  Surgeon: You Olivares M.D.;  Location: SURGERY [facility];  Service: Orthopedics   • GASTRIC BYPASS LAPAROSCOPIC  2000    Lucila en y   • CHOLECYSTECTOMY  2000     Family History   Problem Relation Age of Onset   • Hypertension Mother    • Arthritis Mother    • Depression Mother    • Cancer Father         bladder   • Schizophrenia Father    • Cancer Maternal Grandmother         breast   • Heart Disease Maternal Grandmother    • Psychiatric Illness Other    • Stroke Maternal Aunt    • Other Neg Hx         no connective tissue disorders or known aortic disease     Social History     Socioeconomic History   • Marital status:      Spouse name: Not on file   • Number of children: Not on file   • Years of education: Not on file   • Highest education level: Not on file   Occupational History   • 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   Social Needs   • Financial resource strain: Not on file   • Food " "insecurity:     Worry: Not on file     Inability: Not on file   • Transportation needs:     Medical: Not on file     Non-medical: Not on file   Tobacco Use   • Smoking status: Current Every Day Smoker     Packs/day: 0.25     Years: 3.00     Pack years: 0.75     Types: Cigarettes, Cigars     Last attempt to quit: 2019     Years since quittin.1   • Smokeless tobacco: Never Used   • Tobacco comment: occasional cigar   Substance and Sexual Activity   • Alcohol use: No     Alcohol/week: 0.0 oz     Comment: quit 2.5 yrs ago   • Drug use: No   • Sexual activity: Yes     Partners: Female     Comment: ;    Lifestyle   • Physical activity:     Days per week: Not on file     Minutes per session: Not on file   • Stress: Not on file   Relationships   • Social connections:     Talks on phone: Not on file     Gets together: Not on file     Attends Buddhist service: Not on file     Active member of club or organization: Not on file     Attends meetings of clubs or organizations: Not on file     Relationship status: Not on file   • Intimate partner violence:     Fear of current or ex partner: Not on file     Emotionally abused: Not on file     Physically abused: Not on file     Forced sexual activity: Not on file   Other Topics Concern   •  Service No   • Blood Transfusions Yes   • Caffeine Concern No   • Occupational Exposure No   • Hobby Hazards No   • Sleep Concern No   • Stress Concern Yes   • Weight Concern Yes   • Special Diet No   • Back Care No   • Exercise No   • Bike Helmet No   • Seat Belt Yes   • Self-Exams Yes   Social History Narrative    , lives in [place]       Objective:     Vitals: /80   Pulse 80   Temp 36.7 °C (98.1 °F)   Resp 18   Ht 1.803 m (5' 11\")   Wt 121.1 kg (267 lb)   SpO2 98%   BMI 37.24 kg/m²    General: Alert, pleasant, NAD  HEENT: Normocephalic.    Skin: Warm, dry, no rashes.  Extremities: BLE edema. Using wheelchair, unsteady, PICC line in " place  Neurological: No tremors  Psych:  Affect/mood is normal, judgement is good, memory is intact, grooming is appropriate.    Assessment/Plan:     Diagnoses and all orders for this visit:    Other acute osteomyelitis, unspecified site ([facility])  Has completed his last IV antibiotic.  Followed up with infectious disease last weeks.  Hopeful to get his PICC line out soon.  Has been afebrile at home.  Repeat MRI to follow-up on osteomyelitis.    Closed fracture of ramus of left pubis, initial encounter ([facility])  Currently on pain medications.  Will be following up with pain management/physiatry.    Atrial fibrillation with rapid ventricular response ([facility])  New onset.  Was cardioverted, on digoxin and Cardizem at this time.   Has follow-up with cardiology later today.    Rheumatoid arthritis involving both hands with positive rheumatoid factor ([facility])  Will be following up with rheumatology.  Humira on hold at this time due to immunosuppression.    Localized edema  Chronic.  Leg swelling has increased ever since leaving the hospital.  Taking Lasix.  I discussed if he can wear compression stockings or elevate his legs.    Physical debility  Patient is using wheelchair at this time.  Slow recovery.  Patient is also need of a right knee replacement although this is been on hold at this time.      No follow-ups on file.          Brit JOSHUA.

## 2019-11-20 NOTE — PROGRESS NOTES
Arrhythmia Clinic Note (New patient)     DOS: 11/20/2019    Referring physician: Dr. Alarcon    Chief complaint/Reason for consult: Atrial fibrillation    HPI: 44-year-old man with a history of rheumatoid arthritis, recent pelvic fracture and involvement of osteomyelitis, 21-day hospitalization with subsequent rehab hospitalization, who developed an episode of atrial for ablation with RVR.  This was attempted to be managed for a few days but ultimately received MIA and cardioversion, and was started on Xarelto.  He does have significant history of upper GI bleeding requiring transfusion due to gastric ulcers which were deemed secondary to Motrin.  He has not had any recent dark or tarry stools.  Regarding the atrial fibrillation, he has never had any episodes of atrial fibrillation in the past.  He has noted a history of palpitations which were thought to be either PACs or PVCs by prior doctors.  Since cardioversion, he has not had any further palpitations.  He denies chest pain or dyspnea on exertion.  His main complaint is significant edema, which is happening even before the pelvic fracture.  He notes significant dependent edema and was started on Lasix, which has helped but his swelling has increased recently despite taking Lasix.    ROS (+ highlighted in bold):  Constitutional: Fevers/chills/fatigue/weightloss  HEENT: Blurry vision/eye pain/sore throat/hearing loss  Respiratory: Shortness of breath/cough  Cardiovascular: Chest pain/palpitations/edema/orthopnea/syncope  GI: Nausea/vomitting/diarrhea  MSK: Arthralgias/myagias/muscle weakness  Skin: Rash/sores  Neurological: Numbness/tremors/vertigo  Endocrine: Excessive thirst/polyuria/cold intolerance/heat intolerance  Psych: Depression/anxiety    Past Medical History:   Diagnosis Date   • ADD (attention deficit disorder)    • Alcohol abuse    • Allergic rhinitis 5/21/2009   • Anemia 09/2019   • Anxiety 5/21/2009   • Back pain 5/21/2009   • Bipolar disorder  ([facility])    • Bleeding ulcer 2009   • Depression 2009   • Eczema 2009   • History of bleeding ulcers 2015   • Hypertension    • Insomnia 2009   • Migraine 2009   • Obesity, morbid (HCC) 2009   • Pain 2019    Chronic knee and back pain   • Rheumatoid arteritis ([facility]) 2019    knee, feet   • Sleep apnea 2009    Did not tolerate cpap, does not use it   • Snoring        Past Surgical History:   Procedure Laterality Date   • IRRIGATION & DEBRIDEMENT ORTHO Left 10/9/2019    Procedure: IRRIGATION AND DEBRIDEMENT, WOUND - PELVIC OSTEOMYELITIS;  Surgeon: You Olivares M.D.;  Location: SURGERY [facility];  Service: Orthopedics   • GASTRIC BYPASS LAPAROSCOPIC      Lucila en y   • CHOLECYSTECTOMY         Social History     Socioeconomic History   • Marital status:      Spouse name: Not on file   • Number of children: Not on file   • Years of education: Not on file   • Highest education level: Not on file   Occupational History   • 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   Social Needs   • Financial resource strain: Not on file   • Food insecurity:     Worry: Not on file     Inability: Not on file   • Transportation needs:     Medical: Not on file     Non-medical: Not on file   Tobacco Use   • Smoking status: Current Every Day Smoker     Packs/day: 0.25     Years: 3.00     Pack years: 0.75     Types: Cigarettes, Cigars     Last attempt to quit: 2019     Years since quittin.1   • Smokeless tobacco: Never Used   • Tobacco comment: occasional cigar   Substance and Sexual Activity   • Alcohol use: No     Alcohol/week: 0.0 oz     Comment: quit 2.5 yrs ago   • Drug use: No   • Sexual activity: Yes     Partners: Female     Comment: ;    Lifestyle   • Physical activity:     Days per week: Not on file     Minutes per session: Not on file   • Stress: Not on file   Relationships   • Social connections:     Talks on phone: Not on file     Gets together: Not on file      "Attends Caodaism service: Not on file     Active member of club or organization: Not on file     Attends meetings of clubs or organizations: Not on file     Relationship status: Not on file   • Intimate partner violence:     Fear of current or ex partner: Not on file     Emotionally abused: Not on file     Physically abused: Not on file     Forced sexual activity: Not on file   Other Topics Concern   •  Service No   • Blood Transfusions Yes   • Caffeine Concern No   • Occupational Exposure No   • Hobby Hazards No   • Sleep Concern No   • Stress Concern Yes   • Weight Concern Yes   • Special Diet No   • Back Care No   • Exercise No   • Bike Helmet No   • Seat Belt Yes   • Self-Exams Yes   Social History Narrative    , lives in [place]       Family History   Problem Relation Age of Onset   • Hypertension Mother    • Arthritis Mother    • Depression Mother    • Cancer Father         bladder   • Schizophrenia Father    • Cancer Maternal Grandmother         breast   • Heart Disease Maternal Grandmother    • Psychiatric Illness Other    • Stroke Maternal Aunt    • Other Neg Hx         no connective tissue disorders or known aortic disease       Allergies   Allergen Reactions   • Benadryl [Altaryl]      \"I get agitated\"   • Nsaids      Bleeding, \"I had a bleeding ulcer\"   • Phenergan [Promethazine Hcl]      \"I get very agitated\"   • Penicillins      \"Had some dizziness\"  Tolerated Unasyn 10/2019       Current Outpatient Medications   Medication Sig Dispense Refill   • Cholecalciferol 2000 UNIT Cap Take 1 Cap by mouth every day. 30 Cap 5   • sulfamethoxazole-trimethoprim (BACTRIM DS) 800-160 MG tablet Take 1 Tab by mouth 2 times a day for 14 days. 28 Tab 0   • potassium chloride SA (KDUR) 20 MEQ Tab CR      • HYDROcodone/acetaminophen (NORCO)  MG Tab Take 1-2 Tabs by mouth every 8 hours as needed for up to 30 days. 90 Tab 0   • morphine (MS IR) 15 MG tablet Take 1 Tab by mouth every 8 hours as needed " "for Severe Pain for up to 30 days. 90 Tab 0   • digoxin (LANOXIN) 125 MCG Tab Take 1 Tab by mouth every day at 6 PM. 30 Tab 0   • DILTIAZem CD (CARDIZEM CD) 240 MG CAPSULE SR 24 HR Take 1 Cap by mouth every day. 30 Cap 0   • rivaroxaban (XARELTO) 20 MG Tab tablet Take 1 Tab by mouth with dinner. 30 Tab 11   • predniSONE (DELTASONE) 10 MG Tab Take 10 mg by mouth every day.     • nicotine (NICODERM) 7 MG/24HR PATCH 24 HR Apply 1 Patch to skin as directed every 24 hours.     • furosemide (LASIX) 20 MG Tab Take 1 Tab by mouth 2 times a day. 60 Tab 1   • losartan (COZAAR) 25 MG Tab Take 1 Tab by mouth every day. 30 Tab 2   • omeprazole (PRILOSEC) 40 MG delayed-release capsule Take 1 Cap by mouth every day. 30 Cap 2   • pregabalin (LYRICA) 150 MG Cap Take 1 Cap by mouth 3 times a day for 30 days. 90 Cap 2   • acetaminophen (TYLENOL) 500 MG Tab Take 2 Tabs by mouth 3 times a day. 30 Tab 0   • pregabalin (LYRICA) 150 MG Cap TK 1 C PO TID FOR 30 DAYS  2   • pregabalin (LYRICA) 150 MG Cap      • [START ON 12/2/2019] pregabalin (LYRICA) 150 MG Cap Take 1 Cap by mouth 3 times a day for 30 days. (Patient not taking: Reported on 11/20/2019) 90 Cap 0   • [START ON 1/1/2020] pregabalin (LYRICA) 150 MG Cap Take 1 Cap by mouth 3 times a day for 30 days. (Patient not taking: Reported on 11/20/2019) 90 Cap 0     No current facility-administered medications for this visit.        Physical Exam:  Vitals:    11/20/19 1449   BP: 112/60   BP Location: Right arm   Patient Position: Sitting   BP Cuff Size: Adult   Pulse: 68   SpO2: 97%   Weight: 119.7 kg (264 lb)   Height: 1.803 m (5' 11\")     General appearance: NAD, conversant   Eyes: anicteric sclerae, moist conjunctivae; no lid-lag; PERRLA  HENT: Atraumatic; oropharynx clear with moist mucous membranes and no mucosal ulcerations; normal hard and soft palate  Neck: Trachea midline; FROM, supple, no thyromegaly or lymphadenopathy  Lungs: CTA, with normal respiratory effort and no " intercostal retractions  CV: RRR, no MRGs, no JVD   Abdomen: Soft, non-tender; no masses or HSM  Extremities: Significant bilateral dependent edema up to the thighs  Skin: Normal temperature, turgor and texture; no rash, ulcers or subcutaneous nodules  Psych: Appropriate affect, alert and oriented to person, place and time    Data:  Lipids:   Lab Results   Component Value Date/Time    CHOLSTRLTOT 114 05/01/2019 03:11 PM    TRIGLYCERIDE 64 05/01/2019 03:11 PM    HDL 64 05/01/2019 03:11 PM    LDL 37 05/01/2019 03:11 PM        BMP:  Lab Results   Component Value Date/Time    SODIUM 141 11/16/2019 1030    POTASSIUM 4.0 11/16/2019 1030    CHLORIDE 106 11/16/2019 1030    CO2 27 11/16/2019 1030    GLUCOSE 74 11/16/2019 1030    BUN 13 11/16/2019 1030    CREATININE 0.58 11/16/2019 1030    CALCIUM 8.2 (L) 11/16/2019 1030    ANION 8.0 11/16/2019 1030        TSH:   Lab Results   Component Value Date/Time    TSHULTRASEN 0.930 11/05/2019 1442        THYROXINE (T4):   No results found for: BRIAIR     CBC:   Lab Results   Component Value Date/Time    WBC 6.0 11/08/2019 04:00 AM    WBC 8.4 10/19/2010 12:05 PM    RBC 3.82 (L) 11/08/2019 04:00 AM    RBC 4.96 10/19/2010 12:05 PM    HEMOGLOBIN 11.2 (L) 11/08/2019 04:00 AM    HEMATOCRIT 36.7 (L) 11/08/2019 04:00 AM    MCV 96.1 11/08/2019 04:00 AM    MCV 85 10/19/2010 12:05 PM    MCH 29.3 11/08/2019 04:00 AM    MCH 28.4 10/19/2010 12:05 PM    MCHC 30.5 (L) 11/08/2019 04:00 AM    RDW 68.1 (H) 11/08/2019 04:00 AM    RDW 14.1 10/19/2010 12:05 PM    PLATELETCT 173 11/08/2019 04:00 AM    MPV 9.0 11/08/2019 04:00 AM    NEUTSPOLYS 41.20 (L) 11/08/2019 04:00 AM    LYMPHOCYTES 42.60 (H) 11/08/2019 04:00 AM    MONOCYTES 10.70 11/08/2019 04:00 AM    EOSINOPHILS 3.50 11/08/2019 04:00 AM    BASOPHILS 1.20 11/08/2019 04:00 AM    IMMGRAN 0.80 11/08/2019 04:00 AM    IMMGRAN CANCELED 10/19/2010 12:05 PM    NRBC 0.00 11/08/2019 04:00 AM    NEUTS 2.46 11/08/2019 04:00 AM    NEUTS 5.0 10/19/2010 12:05 PM     LYMPHS 2.55 11/08/2019 04:00 AM    LYMPHS 2.4 10/19/2010 12:05 PM    LYMPHS 92 06/28/2010 03:14 PM    LYMPHS 92 06/28/2010 03:14 PM    MONOS 0.64 11/08/2019 04:00 AM    MONOS 0.8 10/19/2010 12:05 PM    EOS 0.21 11/08/2019 04:00 AM    EOS 0.1 10/19/2010 12:05 PM    BASO 0.07 11/08/2019 04:00 AM    BASO 0.1 10/19/2010 12:05 PM    IMMGRANAB 0.05 11/08/2019 04:00 AM    IMMGRANAB CANCELED 10/19/2010 12:05 PM    NRBCAB 0.00 11/08/2019 04:00 AM        CBC w/o DIFF  Lab Results   Component Value Date/Time    WBC 6.0 11/08/2019 04:00 AM    WBC 8.4 10/19/2010 12:05 PM    RBC 3.82 (L) 11/08/2019 04:00 AM    RBC 4.96 10/19/2010 12:05 PM    HEMOGLOBIN 11.2 (L) 11/08/2019 04:00 AM    MCV 96.1 11/08/2019 04:00 AM    MCV 85 10/19/2010 12:05 PM    MCH 29.3 11/08/2019 04:00 AM    MCH 28.4 10/19/2010 12:05 PM    MCHC 30.5 (L) 11/08/2019 04:00 AM    RDW 68.1 (H) 11/08/2019 04:00 AM    RDW 14.1 10/19/2010 12:05 PM    MPV 9.0 11/08/2019 04:00 AM       Prior echo/stress results reviewed: Echo from August 2019 was significant for bicuspid aortic valve with moderate stenosis, normal systolic function and normal diastolic function.  Echo from November also shows preserved ejection fraction with stable aortic stenosis findings    EKG interpreted by me: Normal sinus rhythm    Impression/Plan:  1. Atrial fibrillation with RVR ([facility])  EKG     1.  Paroxysmal atrial fibrillation with RVR.  This happened in the setting of recent osteomyelitis hospitalization.  He had a PICC line in at the time.  He had no prior history of atrial fibrillation and has not had atrial fib since. His [id number] is 0.  We did discuss management options for atrial fibrillation.  Given that he is otherwise healthy in terms of cardiac risk factors, with only moderate aortic stenosis due to bicuspid valve, I do not believe that anticoagulation beyond 4 weeks after cardioversion is necessary, so he will discontinue Xarelto after 4 full weeks.  Regarding further treatment for  rhythm control of atrial fibrillation, we agreed that this episode of lone atrial fibrillation in the setting of illness may not represent a risk factor for development of clinical atrial fibrillation otherwise.  As such, we will monitor him peripherally, with screening ECGs at visits every 6 months, he will also let us know if he develops palpitations or otherwise has atrial fibrillation regarding cardioversion.  If this occurs, we discussed ablation, which he would be amenable to.    2.  Moderate aortic stenosis.  Due to bicuspid aortic valve.  We did discuss that it is very possible he may need aortic valve surgery if his valve stenosis progresses over the years.  I would recommend screening ultrasounds annually.    3.  Dependent lower extremity edema.  This does not appear to be cardiac in etiology, as he has normal systolic and diastolic function.  Normal RV size and function as well, making pulmonary hypertension very unlikely.  His albumin is chronically low.  However, he has no protein in his urine.  I wonder if he has protein-losing enteropathy.  He does have rheumatoid arthritis and a history of gastric bypass surgery.  He is scheduled to see gastroenterology in the near future, so hopefully they can address the situation.  Continue Lasix for now.    Return to clinic in 6 months    Torsten Ivey MD  Cardiac Electrophysiology

## 2019-11-21 DIAGNOSIS — M86.10 OTHER ACUTE OSTEOMYELITIS, UNSPECIFIED SITE (HCC): ICD-10-CM

## 2019-12-03 ASSESSMENT — ENCOUNTER SYMPTOMS
SHORTNESS OF BREATH: 0
VOMITING: 0
ABDOMINAL PAIN: 0
WEAKNESS: 1
CONSTIPATION: 1
SENSORY CHANGE: 1
DIARRHEA: 0
SORE THROAT: 0
HEADACHES: 0
NAUSEA: 0
NERVOUS/ANXIOUS: 0
CHILLS: 0
MYALGIAS: 1
TINGLING: 1
FEVER: 0

## 2019-12-03 NOTE — PROGRESS NOTES
[facility]    Subjective:     No chief complaint on file.    Interval History: 44-year-old male with a history of rheumatoid arthritis on Humira, obesity, chronic neck right knee plan awaiting right total knee arthroplasty and hospitalized on 9/13 for acute blood loss secondary to a upper GI bleed status post blood transfusion.  Hospitalized from 9/27-10/17/2019, admitted due to worsening left thigh pain.  Patient had sustained a fall at a store approximately 1 month PTA.  He was walking with a cane and his cane got caught on the floor mat resulting in a fall.  He did not hit his head; however he landed in a position where he sustained left groin and thigh pain.  He was initially seen at the urgent care in [place] and given a medication, no imaging was performed at that time.  Over the next several weeks, he noted worsening left thigh and leg pain, in addition to needing to rely on his cane more and more.  On presentation, he was afebrile without leukocytosis.  CT of the pelvis revealed a subacute fracture of the left superior pubic ramus near the symphysis and a complex masslike structure concerning for fluid-filled cavity consistent with a hematoma.  Due to increasing leukocytosis, repeat imaging was done, 2 additional complex areas within the left groin were found.  Blood cultures on 10/2 were negative.  CT-guided drainage of the abscesses was performed on 10/2, cultures positive MSSA.  Underwent I&D on 10/9 with Dr. Olivares, thickened periosteum and soft bone was encountered intraoperatively.  OR pathology was consistent with osteomyelitis and left pubis tissue culture was positive MSSA.  While hospitalized, he was also treated for pityriasis versicolor intertrigo.  Deceived 10 days of fluconazole, followed with miconazole cream.  It was recommended that he remain on the topical therapy for 2 to 4 weeks. Discharged on IV Ancef 2 g every 8 hours x6 weeks, estimated end date  11/19/2019.    11/15/2019: Seen by MAGNUS Ordonez.  States that his lower abdominal surgical site is well-healed without any breakdown or drainage.  He is a follow-up with a surgeon at the beginning of December regarding his right knee replacement.  Unfortunately, he is also having further issues with his right wrist/hand due to being off the Humira and starting to have contractures.  His hand doctor says that he will need surgery, plan will be for this to be done in January, before the right total knee arthroplasty.  Finish IV Ancef on 11/19/2019.  DC PICC line after last infusion dose.  Start p.o. Bactrim DS twice daily, plan to treat for 1 to 3 months.  On Humira (currently on hold-check with rheumatologist before resuming; however, Bactrim and Humira are known to have negative interactions together.  May need to transition patient to p.o. Augmentin or doxycycline if he needs to resume Humira sooner than later.).    Today, 12/4/2019:    tob-       Review of Systems   Constitutional: Negative for chills, fever and malaise/fatigue.   HENT: Negative for sore throat.    Respiratory: Negative for shortness of breath.    Cardiovascular: Positive for chest pain (Off-and-on) and leg swelling.   Gastrointestinal: Positive for constipation. Negative for abdominal pain, diarrhea, nausea and vomiting.   Genitourinary: Negative for dysuria.   Musculoskeletal: Positive for joint pain and myalgias.   Skin: Negative for itching and rash.   Neurological: Positive for tingling, sensory change and weakness. Negative for headaches.   Psychiatric/Behavioral: The patient is not nervous/anxious.        Past Medical History:   Diagnosis Date   • ADD (attention deficit disorder)    • Alcohol abuse    • Allergic rhinitis 5/21/2009   • Anemia 09/2019   • Anxiety 5/21/2009   • Back pain 5/21/2009   • Bipolar disorder (HCC)    • Bleeding ulcer 5/21/2009   • Depression 5/21/2009   • Eczema 5/21/2009   • History of bleeding ulcers  2015   • Hypertension    • Insomnia 2009   • Migraine 2009   • Obesity, morbid (HCC) 2009   • Pain 2019    Chronic knee and back pain   • Rheumatoid arteritis (HCC) 2019    knee, feet   • Sleep apnea 2009    Did not tolerate cpap, does not use it   • Snoring        Family History   Problem Relation Age of Onset   • Hypertension Mother    • Arthritis Mother    • Depression Mother    • Cancer Father         bladder   • Schizophrenia Father    • Cancer Maternal Grandmother         breast   • Heart Disease Maternal Grandmother    • Psychiatric Illness Other    • Stroke Maternal Aunt    • Other Neg Hx         no connective tissue disorders or known aortic disease       Social History     Tobacco Use   • Smoking status: Current Every Day Smoker     Packs/day: 0.25     Years: 3.00     Pack years: 0.75     Types: Cigarettes, Cigars     Last attempt to quit: 2019     Years since quittin.1   • Smokeless tobacco: Never Used   • Tobacco comment: occasional cigar   Substance Use Topics   • Alcohol use: No     Alcohol/week: 0.0 oz     Comment: quit 2.5 yrs ago   • Drug use: No       Allergies: Benadryl [altaryl]; Nsaids; Phenergan [promethazine hcl]; and Penicillins    Pt's medication and problem list reviewed.     Objective:     There were no vitals taken for this visit.    Physical Exam   Constitutional: He is oriented to person, place, and time and well-developed, well-nourished, and in no distress. No distress.   Obese   HENT:   Head: Normocephalic and atraumatic.   Mouth/Throat: Oropharynx is clear and moist. No oropharyngeal exudate.   Eyes: Pupils are equal, round, and reactive to light. Conjunctivae and EOM are normal.   Neck: Normal range of motion. Neck supple. No tracheal deviation present.   Cardiovascular: Normal rate and regular rhythm.   Murmur heard.  Pulmonary/Chest: Effort normal and breath sounds normal. No respiratory distress. He has no wheezes. He has no rales.    Abdominal: Soft. Bowel sounds are normal. He exhibits no distension. There is no tenderness.   Protuberant    Lower mid abdomen, surgical site-well-healed and approximated without any breakdown, no erythema, no active drainage, nontender to palpation.   Musculoskeletal: Normal range of motion.         General: Edema (Trace BLE) present.      Comments: LUE PICC- CDI, non tender, no erythema.    Left medial thigh-slightly tender to palpation, no induration or fluctuance noted.   Neurological: He is alert and oriented to person, place, and time. No cranial nerve deficit.   Wheelchair   Skin: Skin is warm and dry. No rash noted. He is not diaphoretic. No erythema.   Psychiatric: Mood, memory, affect and judgment normal.   Pleasant   Vitals reviewed.      Labs:   Ref. Range 11/16/2019 10:30   Sed Rate Westergren Latest Ref Range: 0 - 15 mm/hour 15   Sodium Latest Ref Range: 135 - 145 mmol/L 141   Potassium Latest Ref Range: 3.6 - 5.5 mmol/L 4.0   Chloride Latest Ref Range: 96 - 112 mmol/L 106   Co2 Latest Ref Range: 20 - 33 mmol/L 27   Anion Gap Latest Ref Range: 0.0 - 11.9  8.0   Glucose Latest Ref Range: 65 - 99 mg/dL 74   Bun Latest Ref Range: 8 - 22 mg/dL 13   Creatinine Latest Ref Range: 0.50 - 1.40 mg/dL 0.58   GFR If  Latest Ref Range: >60 mL/min/1.73 m 2 >60   GFR If Non  Latest Ref Range: >60 mL/min/1.73 m 2 >60   Calcium Latest Ref Range: 8.5 - 10.5 mg/dL 8.2 (L)   AST(SGOT) Latest Ref Range: 12 - 45 U/L 24   ALT(SGPT) Latest Ref Range: 2 - 50 U/L 9   Alkaline Phosphatase Latest Ref Range: 30 - 99 U/L 74   Total Bilirubin Latest Ref Range: 0.1 - 1.5 mg/dL 0.2   Albumin Latest Ref Range: 3.2 - 4.9 g/dL 3.3   Total Protein Latest Ref Range: 6.0 - 8.2 g/dL 5.5 (L)   Globulin Latest Ref Range: 1.9 - 3.5 g/dL 2.2   A-G Ratio Latest Units: g/dL 1.5   Stat C-Reactive Protein Latest Ref Range: 0.00 - 0.75 mg/dL 0.43     EKG on 11/20/19:  Measurements   Intervals                                 Axis   Rate:       68                           P:          24   IL:         167                          QRS:        13   QRSD:       114                          T:          58   QT:         377   QTc:        401      Assessment and Plan:   The following treatment plan was discussed with patient at length:    No diagnosis found.    1. Other acute osteomyelitis, unspecified site (HCC)  WESTERGREN SED RATE    CRP QUANTITIVE (NON-CARDIAC)    sulfamethoxazole-trimethoprim (BACTRIM DS) 800-160 MG tablet    Comp Metabolic Panel    -  Discussed dosing and side effects of medications being prescribed.  Pt instructed to call clinic with any adverse effects or to discontinue the medication and go to the ER should difficulty breathing, SOB, CP, facial swelling and/or gross rash/itching occurs.   -Monitor for s/sx of worsening transitioning from IV to PO abx: increased redness, pain, swelling, drainage, breakdown of surgical site, fevers, chills, general malaise, etc.  Notify ID or go to ER should these s/sx occur.  -ESR and CRP to be done during the next set of labs with dressing change to an inflammatory markers.  -Repeat CMP, ESR and CRP about 7 to 10 days after starting p.o. antibiotics to monitor for hepato-nephrotoxicity, hyperkalemia and trend inflammatory markers.   2. Closed fracture of ramus of left pubis, initial encounter ([facility])      Follow-up with Ortho surgeon as directed.  Antibiotics as above.   3. MSSA (methicillin susceptible Staphylococcus aureus) infection      As above   4. Abscess of left thigh      MRI showing nearly resolved, now 23 x 7 x 5 mm.  Antibiotics as above.   5. Rheumatoid arthritis involving both hands with positive rheumatoid factor ([facility])      Follow-up with rheumatologist as directed.  , chronically immunosuppressed on steroids.  At risk for future infections.   6. Chronic pain of right knee      FU with Ortho for future Right TKA   7. Intertrigo      Resolved   8. Atrial  fibrillation with rapid ventricular response (HCC)      On Xarelto     Follow up: ***, RTC sooner if needed. FU with PCP for ongoing chronic medical conditions.     Paola Syed A.P.R.N.       Please note that this dictation was created using voice recognition software. I have  worked with technical experts from [facility] to optimize the interface.  I have made every reasonable attempt to correct obvious errors, but there may be errors of grammar and possibly content that I did not discover before finalizing the note.

## 2019-12-04 ENCOUNTER — HOSPITAL ENCOUNTER (OUTPATIENT)
Dept: LAB | Facility: MEDICAL CENTER | Age: 44
End: 2019-12-04
Attending: FAMILY MEDICINE
Payer: COMMERCIAL

## 2019-12-04 ENCOUNTER — APPOINTMENT (OUTPATIENT)
Dept: INFECTIOUS DISEASES | Facility: MEDICAL CENTER | Age: 44
End: 2019-12-04
Payer: COMMERCIAL

## 2019-12-04 DIAGNOSIS — M86.10 OTHER ACUTE OSTEOMYELITIS, UNSPECIFIED SITE (HCC): ICD-10-CM

## 2019-12-04 LAB
CRP SERPL HS-MCNC: 0.17 MG/DL (ref 0–0.75)
ERYTHROCYTE [SEDIMENTATION RATE] IN BLOOD BY WESTERGREN METHOD: 25 MM/HOUR (ref 0–15)

## 2019-12-04 PROCEDURE — 86140 C-REACTIVE PROTEIN: CPT

## 2019-12-04 PROCEDURE — 85652 RBC SED RATE AUTOMATED: CPT

## 2019-12-04 PROCEDURE — 36415 COLL VENOUS BLD VENIPUNCTURE: CPT

## 2019-12-10 ENCOUNTER — OFFICE VISIT (OUTPATIENT)
Dept: BEHAVIORAL HEALTH | Facility: CLINIC | Age: 44
End: 2019-12-10
Payer: COMMERCIAL

## 2019-12-10 DIAGNOSIS — F90.9 ADULT ADHD: ICD-10-CM

## 2019-12-10 DIAGNOSIS — F41.9 ANXIETY DISORDER, UNSPECIFIED TYPE: ICD-10-CM

## 2019-12-10 DIAGNOSIS — F39 MOOD DISORDER (HCC): Chronic | ICD-10-CM

## 2019-12-10 PROCEDURE — 99204 OFFICE O/P NEW MOD 45 MIN: CPT | Mod: GC | Performed by: PSYCHIATRY & NEUROLOGY
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
## 2019-12-11 ENCOUNTER — OFFICE VISIT (OUTPATIENT)
Dept: PHYSICAL MEDICINE AND REHAB | Facility: MEDICAL CENTER | Age: 44
End: 2019-12-11
Payer: COMMERCIAL

## 2019-12-11 VITALS
WEIGHT: 260 LBS | HEART RATE: 88 BPM | BODY MASS INDEX: 36.4 KG/M2 | DIASTOLIC BLOOD PRESSURE: 62 MMHG | TEMPERATURE: 97.5 F | SYSTOLIC BLOOD PRESSURE: 106 MMHG | HEIGHT: 71 IN | OXYGEN SATURATION: 97 %

## 2019-12-11 VITALS
BODY MASS INDEX: 36.82 KG/M2 | SYSTOLIC BLOOD PRESSURE: 119 MMHG | HEIGHT: 71 IN | HEART RATE: 85 BPM | DIASTOLIC BLOOD PRESSURE: 93 MMHG

## 2019-12-11 DIAGNOSIS — Z98.84 HISTORY OF GASTRIC BYPASS: ICD-10-CM

## 2019-12-11 DIAGNOSIS — M25.569 CHRONIC KNEE PAIN, UNSPECIFIED LATERALITY: ICD-10-CM

## 2019-12-11 DIAGNOSIS — M06.9 RHEUMATOID ARTHRITIS INVOLVING MULTIPLE SITES, UNSPECIFIED RHEUMATOID FACTOR PRESENCE: ICD-10-CM

## 2019-12-11 DIAGNOSIS — M79.2 NERVE PAIN: ICD-10-CM

## 2019-12-11 DIAGNOSIS — M25.561 CHRONIC PAIN OF RIGHT KNEE: ICD-10-CM

## 2019-12-11 DIAGNOSIS — G89.29 CHRONIC PAIN OF RIGHT KNEE: ICD-10-CM

## 2019-12-11 DIAGNOSIS — Z87.11 HISTORY OF GASTRIC ULCER: ICD-10-CM

## 2019-12-11 DIAGNOSIS — S32.592A CLOSED FRACTURE OF RAMUS OF LEFT PUBIS, INITIAL ENCOUNTER (HCC): ICD-10-CM

## 2019-12-11 DIAGNOSIS — M17.10 ARTHRITIS OF KNEE: ICD-10-CM

## 2019-12-11 DIAGNOSIS — M86.10 OTHER ACUTE OSTEOMYELITIS, UNSPECIFIED SITE (HCC): ICD-10-CM

## 2019-12-11 DIAGNOSIS — G89.29 CHRONIC KNEE PAIN, UNSPECIFIED LATERALITY: ICD-10-CM

## 2019-12-11 PROCEDURE — 99214 OFFICE O/P EST MOD 30 MIN: CPT | Performed by: PHYSICAL MEDICINE & REHABILITATION

## 2019-12-11 RX ORDER — HYDROCODONE BITARTRATE AND ACETAMINOPHEN 10; 325 MG/1; MG/1
1 TABLET ORAL EVERY 8 HOURS PRN
Qty: 90 TAB | Refills: 0 | Status: SHIPPED | OUTPATIENT
Start: 2020-01-15 | End: 2020-02-12

## 2019-12-11 RX ORDER — HYDROCODONE BITARTRATE AND ACETAMINOPHEN 10; 325 MG/1; MG/1
1 TABLET ORAL EVERY 8 HOURS PRN
Qty: 90 TAB | Refills: 0 | Status: SHIPPED | OUTPATIENT
Start: 2019-12-16 | End: 2020-01-15

## 2019-12-11 RX ORDER — PREGABALIN 150 MG/1
150 CAPSULE ORAL
Qty: 90 CAP | Refills: 0 | Status: ON HOLD | OUTPATIENT
Start: 2020-02-01 | End: 2020-01-22

## 2019-12-11 RX ORDER — MORPHINE SULFATE 15 MG/1
15 TABLET ORAL 2 TIMES DAILY PRN
Qty: 30 TAB | Refills: 0 | Status: SHIPPED | OUTPATIENT
Start: 2020-01-15 | End: 2020-02-12

## 2019-12-11 RX ORDER — MORPHINE SULFATE 15 MG/1
15 TABLET ORAL 2 TIMES DAILY PRN
Qty: 60 TAB | Refills: 0 | Status: SHIPPED | OUTPATIENT
Start: 2019-12-16 | End: 2020-01-15

## 2019-12-11 ASSESSMENT — PATIENT HEALTH QUESTIONNAIRE - PHQ9
5. POOR APPETITE OR OVEREATING: 0
6. FEELING BAD ABOUT YOURSELF - OR THAT YOU ARE A FAILURE OR HAVE LET YOURSELF OR YOUR FAMILY DOWN: 0
SUM OF ALL RESPONSES TO PHQ QUESTIONS 1-9: 1
2. FEELING DOWN, DEPRESSED, IRRITABLE, OR HOPELESS: 0
SUM OF ALL RESPONSES TO PHQ9 QUESTIONS 1 AND 2: 0
3. TROUBLE FALLING OR STAYING ASLEEP OR SLEEPING TOO MUCH: 0
4. FEELING TIRED OR HAVING LITTLE ENERGY: 1
9. THOUGHTS THAT YOU WOULD BE BETTER OFF DEAD, OR OF HURTING YOURSELF: 0
7. TROUBLE CONCENTRATING ON THINGS, SUCH AS READING THE NEWSPAPER OR WATCHING TELEVISION: 0
1. LITTLE INTEREST OR PLEASURE IN DOING THINGS: 0
8. MOVING OR SPEAKING SO SLOWLY THAT OTHER PEOPLE COULD HAVE NOTICED. OR THE OPPOSITE, BEING SO FIGETY OR RESTLESS THAT YOU HAVE BEEN MOVING AROUND A LOT MORE THAN USUAL: 0

## 2019-12-11 ASSESSMENT — PAIN SCALES - GENERAL: PAINLEVEL: 5=MODERATE PAIN

## 2019-12-11 NOTE — Clinical Note
Dear SMITH TurnerPGLORIA. , Thank you for the referral of Richard Hubbard II.  Please see my note for more details Should you have any questions or concerns please do not hesitate to contact me. Gerardo Gutierrez M.D.

## 2019-12-12 NOTE — PROGRESS NOTES
Follow up patient note  Interventional spine and sports physiatry, Physical medicine rehabilitation      Chief complaint:   Chief Complaint   Patient presents with   • Follow-Up     Arthritis multiple sites         HISTORY    Please see new patient note by Dr Gutierrez,  for more details.     HPI  Patient identification: Richard Hubbard II 44 y.o. male  With Diagnoses of Rheumatoid arthritis involving multiple sites, unspecified rheumatoid factor presence ([facility]), Chronic pain of right knee, Other acute osteomyelitis, unspecified site ([facility]), Closed fracture of ramus of left pubis, initial encounter ([facility]), Chronic knee pain, unspecified laterality, Arthritis of knee, History of gastric ulcer, avoid NSAIDs, History of gastric bypass, avoid NSAIDs, and Nerve pain were pertinent to this visit.     Chronic right knee pain in the process of getting a knee replacement    History of severe pelvic pain with pelvic fracture and osteomyelitis followed by infectious disease and from orthopedic    Wrist pain is well with fracture upcoming    The patient's pain has been severe but slowly improving.  He has been working with physical therapy as well as working with walking and standing and movement is home.  He is taking the pain medication as prescribed and typically using morphine IR 15, 3 times daily and Norco 10, 3 times daily.  These are helping with the pain.  He believes the Norco is helping more with his pain.  He is also using Lyrica.  The patient's wife is a nurse practitioner and is been managing his medications and keeping them locked.          12/11/2019 I reviewed the notes from psychiatry which were done 12/10/2019 from Dr. Ribeiro.  Notes that the diagnosis is still unclear for bipolar disorder and wishes to speak with the patient's wife.  History of possible ADHD with stimulant medications but also a history of misusing those medications as well.  No new medications were given as there is still an unclear  diagnosis at that time with plans to follow-up with the patient.    I reviewed the discharge summary from records from 11/10/2019.  Patient had A. fib RVR with a heart rate of 190 on admission, defibrillation was done, diltiazem was given, cardiology was consulted.  The patient was cardioverted into sinus rhythm.  I reviewed the procedure note from Dr. Alarcon of cardiology for MIA showing no signs of thrombus.  Cardioversion was done.  I reviewed the discharge summary from Dr. Lalo Pisano from 11/2/2019.  The patient was seen for acute rehab for osteomyelitis and pelvic fracture.  The patient also had a chronic compression fracture of T11.     ROS Red Flags :   Fever, Chills, Sweats: Denies  Involuntary Weight Loss: Denies  Bowel/Bladder Incontinence: Denies  Saddle Anesthesia: Denies        PMHx:   Past Medical History:   Diagnosis Date   • ADD (attention deficit disorder)    • Alcohol abuse    • Allergic rhinitis 5/21/2009   • Anemia 09/2019   • Anxiety 5/21/2009   • Back pain 5/21/2009   • Bipolar disorder (HCC)    • Bleeding ulcer 5/21/2009   • Depression 5/21/2009   • Eczema 5/21/2009   • History of bleeding ulcers 2/12/2015   • Hypertension    • Insomnia 5/21/2009   • Migraine 5/21/2009   • Obesity, morbid (HCC) 5/21/2009   • Pain 09/2019    Chronic knee and back pain   • Rheumatoid arteritis (HCC) 09/2019    knee, feet   • Sleep apnea 5/21/2009    Did not tolerate cpap, does not use it   • Snoring        PSHx:   Past Surgical History:   Procedure Laterality Date   • IRRIGATION & DEBRIDEMENT ORTHO Left 10/9/2019    Procedure: IRRIGATION AND DEBRIDEMENT, WOUND - PELVIC OSTEOMYELITIS;  Surgeon: You Olivares M.D.;  Location: SURGERY [facility];  Service: Orthopedics   • GASTRIC BYPASS LAPAROSCOPIC  2000    Lucila en y   • CHOLECYSTECTOMY  2000       Family history   Family History   Problem Relation Age of Onset   • Hypertension Mother    • Arthritis Mother    • Depression Mother    • Cancer  Father         bladder   • Schizophrenia Father    • Cancer Maternal Grandmother         breast   • Heart Disease Maternal Grandmother    • Psychiatric Illness Other    • Stroke Maternal Aunt    • Other Neg Hx         no connective tissue disorders or known aortic disease         Medications:   Outpatient Medications Marked as Taking for the 12/11/19 encounter (Office Visit) with Gerardo Gutierrez M.D.   Medication Sig Dispense Refill   • [START ON 12/16/2019] HYDROcodone/acetaminophen (NORCO)  MG Tab Take 1 Tab by mouth every 8 hours as needed for up to 30 days. 90 Tab 0   • [START ON 12/16/2019] morphine (MS IR) 15 MG tablet Take 1 Tab by mouth 2 times a day as needed for Severe Pain for up to 30 days. 60 Tab 0   • [START ON 1/15/2020] HYDROcodone/acetaminophen (NORCO)  MG Tab Take 1 Tab by mouth every 8 hours as needed for up to 30 days. 90 Tab 0   • [START ON 1/15/2020] morphine (MS IR) 15 MG tablet Take 1 Tab by mouth 2 times a day as needed for Severe Pain for up to 30 days. 30 Tab 0   • [START ON 2/1/2020] pregabalin (LYRICA) 150 MG Cap Take 1 Cap by mouth 3 times a day for 30 days. 90 Cap 0   • Cholecalciferol 2000 UNIT Cap Take 1 Cap by mouth every day. 30 Cap 5   • potassium chloride SA (KDUR) 20 MEQ Tab CR      • pregabalin (LYRICA) 150 MG Cap Take 1 Cap by mouth 3 times a day for 30 days. 90 Cap 0   • [START ON 1/1/2020] pregabalin (LYRICA) 150 MG Cap Take 1 Cap by mouth 3 times a day for 30 days. 90 Cap 0   • digoxin (LANOXIN) 125 MCG Tab Take 1 Tab by mouth every day at 6 PM. 30 Tab 0   • DILTIAZem CD (CARDIZEM CD) 240 MG CAPSULE SR 24 HR Take 1 Cap by mouth every day. 30 Cap 0   • rivaroxaban (XARELTO) 20 MG Tab tablet Take 1 Tab by mouth with dinner. 30 Tab 11   • predniSONE (DELTASONE) 10 MG Tab Take 10 mg by mouth every day.     • nicotine (NICODERM) 7 MG/24HR PATCH 24 HR Apply 1 Patch to skin as directed every 24 hours.     • furosemide (LASIX) 20 MG Tab Take 1 Tab by mouth 2 times a  "day. 60 Tab 1   • losartan (COZAAR) 25 MG Tab Take 1 Tab by mouth every day. 30 Tab 2   • omeprazole (PRILOSEC) 40 MG delayed-release capsule Take 1 Cap by mouth every day. 30 Cap 2   • acetaminophen (TYLENOL) 500 MG Tab Take 2 Tabs by mouth 3 times a day. 30 Tab 0        Current Outpatient Medications on File Prior to Visit   Medication Sig Dispense Refill   • Cholecalciferol 2000 UNIT Cap Take 1 Cap by mouth every day. 30 Cap 5   • potassium chloride SA (KDUR) 20 MEQ Tab CR      • pregabalin (LYRICA) 150 MG Cap Take 1 Cap by mouth 3 times a day for 30 days. 90 Cap 0   • [START ON 1/1/2020] pregabalin (LYRICA) 150 MG Cap Take 1 Cap by mouth 3 times a day for 30 days. 90 Cap 0   • digoxin (LANOXIN) 125 MCG Tab Take 1 Tab by mouth every day at 6 PM. 30 Tab 0   • DILTIAZem CD (CARDIZEM CD) 240 MG CAPSULE SR 24 HR Take 1 Cap by mouth every day. 30 Cap 0   • rivaroxaban (XARELTO) 20 MG Tab tablet Take 1 Tab by mouth with dinner. 30 Tab 11   • predniSONE (DELTASONE) 10 MG Tab Take 10 mg by mouth every day.     • nicotine (NICODERM) 7 MG/24HR PATCH 24 HR Apply 1 Patch to skin as directed every 24 hours.     • furosemide (LASIX) 20 MG Tab Take 1 Tab by mouth 2 times a day. 60 Tab 1   • losartan (COZAAR) 25 MG Tab Take 1 Tab by mouth every day. 30 Tab 2   • omeprazole (PRILOSEC) 40 MG delayed-release capsule Take 1 Cap by mouth every day. 30 Cap 2   • acetaminophen (TYLENOL) 500 MG Tab Take 2 Tabs by mouth 3 times a day. 30 Tab 0     No current facility-administered medications on file prior to visit.          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   Social Needs   • Financial resource strain: Not on file   • Food insecurity:     Worry: Not on file     Inability: Not on file   • Transportation needs:     Medical: Not on file     Non-medical: Not on file   Tobacco Use   • Smoking status: Current Every Day Smoker     Packs/day: 0.25     Years: 3.00     Pack years: 0.75     Types: Cigarettes, Cigars     Last attempt to quit: 2019     Years since quittin.2   • Smokeless tobacco: Never Used   • Tobacco comment: occasional cigar   Substance and Sexual Activity   • Alcohol use: No     Alcohol/week: 0.0 oz     Comment: quit 2.5 yrs ago   • Drug use: No   • Sexual activity: Yes     Partners: Female     Comment: ;    Lifestyle   • Physical activity:     Days per week: Not on file     Minutes per session: Not on file   • Stress: Not on file   Relationships   • Social connections:     Talks on phone: Not on file     Gets together: Not on file     Attends Congregational service: Not on file     Active member of club or organization: Not on file     Attends meetings of clubs or organizations: Not on file     Relationship status: Not on file   • Intimate partner violence:     Fear of current or ex partner: Not on file     Emotionally abused: Not on file     Physically abused: Not on file     Forced sexual activity: Not on file   Other Topics Concern   •  Service No   • Blood Transfusions Yes   • Caffeine Concern No   • Occupational Exposure No   • Hobby Hazards No   • Sleep Concern No   • Stress Concern Yes   • Weight Concern Yes   • Special Diet No   • Back Care No   • Exercise No   • Bike Helmet No   • Seat Belt Yes   • Self-Exams Yes   Social History Narrative    , lives in [place]         EXAMINATION     Physical Exam:   Vitals: /62 (BP Location: Right arm, Patient Position: Sitting, BP Cuff Size: Adult)    "Pulse 88   Temp 36.4 °C (97.5 °F) (Temporal)   Ht 1.803 m (5' 11\")   Wt 117.9 kg (260 lb)   SpO2 97%     Constitutional:   Body Habitus: Body mass index is 36.26 kg/m².  Cooperation: Fully cooperates with exam  Appearance: Well-groomed no disheveled    Respiratory-  breathing comfortable on room air, no audible wheezing  Cardiovascular- capillary refills less than 2 seconds. No lower extremity edema is noted.   Psychiatric- alert and oriented ×3. Normal affect.    MSK: -Strength is 4/5 in hip flexion and knee extension however it is difficult to tell if this is pain mediated bilaterally.  5 out of 5 strength in ankle dorsiflexion, great toe extension, plantar flexion bilaterally.          MEDICAL DECISION MAKING    DATA    Labs:   Lab Results   Component Value Date/Time    SODIUM 141 11/16/2019 10:30 AM    POTASSIUM 4.0 11/16/2019 10:30 AM    CHLORIDE 106 11/16/2019 10:30 AM    CO2 27 11/16/2019 10:30 AM    GLUCOSE 74 11/16/2019 10:30 AM    BUN 13 11/16/2019 10:30 AM    CREATININE 0.58 11/16/2019 10:30 AM    CREATININE 0.95 10/19/2010 12:05 PM    BUNCREATRAT 9 10/19/2010 12:05 PM    GLOMRATE >59 10/19/2010 12:05 PM        Lab Results   Component Value Date/Time    PROTHROMBTM 13.6 10/02/2019 08:59 AM    INR 1.03 10/02/2019 08:59 AM        Lab Results   Component Value Date/Time    WBC 6.0 11/08/2019 04:00 AM    WBC 8.4 10/19/2010 12:05 PM    RBC 3.82 (L) 11/08/2019 04:00 AM    RBC 4.96 10/19/2010 12:05 PM    HEMOGLOBIN 11.2 (L) 11/08/2019 04:00 AM    HEMATOCRIT 36.7 (L) 11/08/2019 04:00 AM    MCV 96.1 11/08/2019 04:00 AM    MCV 85 10/19/2010 12:05 PM    MCH 29.3 11/08/2019 04:00 AM    MCH 28.4 10/19/2010 12:05 PM    MCHC 30.5 (L) 11/08/2019 04:00 AM    MPV 9.0 11/08/2019 04:00 AM    NEUTSPOLYS 41.20 (L) 11/08/2019 04:00 AM    LYMPHOCYTES 42.60 (H) 11/08/2019 04:00 AM    MONOCYTES 10.70 11/08/2019 04:00 AM    EOSINOPHILS 3.50 11/08/2019 04:00 AM    BASOPHILS 1.20 11/08/2019 04:00 AM    HYPOCHROMIA 1+ 10/25/2019 " 04:00 AM    ANISOCYTOSIS 1+ 10/25/2019 04:00 AM        Lab Results   Component Value Date/Time    HBA1C 4.8 10/18/2019 05:18 AM          Imaging:   I personally reviewed following images    MRI pelvis without contrast 10/6/2019 with fracture of the left pubic ramus.    I reviewed the following radiology reports    MRI pelvis with and without contrast 11/14/2019  Interval improvement in the left parasymphyseal centered edema following subacute fracture with improving muscular and marrow signal     Small fluid collection adjacent to the superior pubic ramus likely is a hematoma. Superinfection is not suspected but cannot be excluded on the basis of imaging alone     Bilateral zone 1 sacral fractures       Results for orders placed during the hospital encounter of 06/07/11   MR-BRAIN-WITH & W/O    Impression 1.  Normal MRI scan of the brain with and without contrast enhancement.                                     Results for orders placed during the hospital encounter of 09/27/19   MR-LUMBAR SPINE-W/O    Impression 1.  Acute bilateral sacral alae fractures.  2.  There is edema in the lower posterior paraspinal soft tissue likely representing soft tissue contusion.  3.  There is no lumbar spine fractures.              Results for orders placed during the hospital encounter of 09/27/19   MR-THORACIC SPINE-W/O    Impression 1.  No acute fracture in the thoracic spine.  2.  Mild chronic compression deformity at T11.        Results for orders placed during the hospital encounter of 09/27/19   MR-LUMBAR SPINE-W/O    Impression 1.  Acute bilateral sacral alae fractures.  2.  There is edema in the lower posterior paraspinal soft tissue likely representing soft tissue contusion.  3.  There is no lumbar spine fractures.                              Results for orders placed during the hospital encounter of 11/14/19   MR-PELVIS-WITH & W/O AND SEQUENCES    Impression Interval improvement in the left parasymphyseal centered edema  following subacute fracture with improving muscular and marrow signal    Small fluid collection adjacent to the superior pubic ramus likely is a hematoma. Superinfection is not suspected but cannot be excluded on the basis of imaging alone    Bilateral zone 1 sacral fractures      Results for orders placed during the hospital encounter of 09/27/19   MR-PELVIS W/O    Impression 1.  Limited exam secondary to significant patient motion.  2.  Subacute fracture involving the left pubic bone with osteomyelitis and probable intraosseous abscess.  3.  Extensive edema and enlargement of the muscles of the medial compartment, pectineus muscle, and pubococcygeus muscle is consistent with known intramuscular abscesses. Fluid collection is most extensive in the adductor longus muscle.  4.  Subacute fractures of the sacrum          Comment: Results given to Dr. Varma through [place] text at 3:56 PM                                                                                                 Results for orders placed during the hospital encounter of 09/27/19   CT-ABDOMEN-PELVIS WITH    Impression 1.  Interval decrease in the intramuscular abscesses involving the left pelvis and thigh following drain placement. Collection in the pubococcygeus muscle is similar in size to the prior exam.    2.  Ventral hernia containing anterior bowel wall and fat. No secondary obstruction.                 Results for orders placed during the hospital encounter of 09/27/19   CT-PELVIS WITH    Impression 1.  Recent but probably subacute fracture of the left superior pubic ramus near the symphysis. Adjacent and just lateral to this are two complex masslike structures, the largest and most medial of which contains a fluid-filled cavity. These are   consistent with hematomata, possibly intramuscular.                  Results for orders placed during the hospital encounter of 08/07/08   DX-ANKLE 3+ VIEWS    Impression IMPRESSION:    NORMAL LEFT ANKLE  SERIES.      TRB/sb    Read By ASHLEY HUSAIN MD on Aug  7 2008  6:52PM  : [facility] Transcription Date: Aug  8 2008 10:10AM  THIS DOCUMENT HAS BEEN ELECTRONICALLY SIGNED BY: ASHLEY HUSAIN MD on   Aug  8 2008  4:42PM               Results for orders placed during the hospital encounter of 05/07/09   DX-CHEST-2 VIEWS    Impression IMPRESSION:    NORMAL CHEST.     JHW/danie          Read By TABITHA SUERO MD on May  7 2009  3:54PM  : [facility] Transcription Date: May  7 2009  4:28PM  THIS DOCUMENT HAS BEEN ELECTRONICALLY SIGNED BY: TABITHA SUERO MD on May    7 2009  4:37PM                     Results for orders placed in visit on 02/09/16   DX-FOOT-COMPLETE 3+ RIGHT    Impression 1.  Flatfoot deformity.    2.  Mild DJD.        Results for orders placed in visit on 02/09/16   DX-HAND 2- RIGHT    Impression Normal exam.                          Results for orders placed during the hospital encounter of 09/16/19   DX-LUMBAR SPINE-2 OR 3 VIEWS    Impression Dextroconvex lumbar curvature.  No acute osseous abnormality.  No malalignment.                            Results for orders placed during the hospital encounter of 09/16/19   DX-THORACIC SPINE-WITH SWIMMERS VIEW    Impression Mild chronic height loss of the mid thoracic spine from T9 to T11.                   DIAGNOSIS   Visit Diagnoses     ICD-10-CM   1. Rheumatoid arthritis involving multiple sites, unspecified rheumatoid factor presence ([facility]) M06.9   2. Chronic pain of right knee M25.561    G89.29   3. Other acute osteomyelitis, unspecified site ([facility]) M86.10   4. Closed fracture of ramus of left pubis, initial encounter ([facility]) S32.592A   5. Chronic knee pain, unspecified laterality M25.569    G89.29   6. Arthritis of knee M17.10   7. History of gastric ulcer, avoid NSAIDs Z87.19   8. History of gastric bypass, avoid NSAIDs Z98.84   9. Nerve pain M79.2         ASSESSMENT and PLAN:     Richard Hubbard II 44 y.o. male       was seen  today for follow-up.    Diagnoses and all orders for this visit:    Rheumatoid arthritis involving multiple sites, unspecified rheumatoid factor presence (HCC)  -     HYDROcodone/acetaminophen (NORCO)  MG Tab; Take 1 Tab by mouth every 8 hours as needed for up to 30 days.  -     morphine (MS IR) 15 MG tablet; Take 1 Tab by mouth 2 times a day as needed for Severe Pain for up to 30 days.  -     HYDROcodone/acetaminophen (NORCO)  MG Tab; Take 1 Tab by mouth every 8 hours as needed for up to 30 days.  -     morphine (MS IR) 15 MG tablet; Take 1 Tab by mouth 2 times a day as needed for Severe Pain for up to 30 days.  -     Consent for Opiate Prescription  -     Controlled Substance Treatment Agreement    Chronic pain of right knee  -     HYDROcodone/acetaminophen (NORCO)  MG Tab; Take 1 Tab by mouth every 8 hours as needed for up to 30 days.  -     morphine (MS IR) 15 MG tablet; Take 1 Tab by mouth 2 times a day as needed for Severe Pain for up to 30 days.  -     HYDROcodone/acetaminophen (NORCO)  MG Tab; Take 1 Tab by mouth every 8 hours as needed for up to 30 days.  -     morphine (MS IR) 15 MG tablet; Take 1 Tab by mouth 2 times a day as needed for Severe Pain for up to 30 days.  -     Consent for Opiate Prescription  -     Controlled Substance Treatment Agreement    Other acute osteomyelitis, unspecified site (HCC)  -     HYDROcodone/acetaminophen (NORCO)  MG Tab; Take 1 Tab by mouth every 8 hours as needed for up to 30 days.  -     morphine (MS IR) 15 MG tablet; Take 1 Tab by mouth 2 times a day as needed for Severe Pain for up to 30 days.  -     HYDROcodone/acetaminophen (NORCO)  MG Tab; Take 1 Tab by mouth every 8 hours as needed for up to 30 days.  -     morphine (MS IR) 15 MG tablet; Take 1 Tab by mouth 2 times a day as needed for Severe Pain for up to 30 days.  -     Consent for Opiate Prescription  -     Controlled Substance Treatment Agreement    Closed fracture of ramus  of left pubis, initial encounter (HCC)  -     HYDROcodone/acetaminophen (NORCO)  MG Tab; Take 1 Tab by mouth every 8 hours as needed for up to 30 days.  -     morphine (MS IR) 15 MG tablet; Take 1 Tab by mouth 2 times a day as needed for Severe Pain for up to 30 days.  -     HYDROcodone/acetaminophen (NORCO)  MG Tab; Take 1 Tab by mouth every 8 hours as needed for up to 30 days.  -     morphine (MS IR) 15 MG tablet; Take 1 Tab by mouth 2 times a day as needed for Severe Pain for up to 30 days.  -     Consent for Opiate Prescription  -     Controlled Substance Treatment Agreement    Chronic knee pain, unspecified laterality  -     HYDROcodone/acetaminophen (NORCO)  MG Tab; Take 1 Tab by mouth every 8 hours as needed for up to 30 days.  -     morphine (MS IR) 15 MG tablet; Take 1 Tab by mouth 2 times a day as needed for Severe Pain for up to 30 days.  -     HYDROcodone/acetaminophen (NORCO)  MG Tab; Take 1 Tab by mouth every 8 hours as needed for up to 30 days.  -     morphine (MS IR) 15 MG tablet; Take 1 Tab by mouth 2 times a day as needed for Severe Pain for up to 30 days.  -     Consent for Opiate Prescription  -     Controlled Substance Treatment Agreement    Arthritis of knee  -     HYDROcodone/acetaminophen (NORCO)  MG Tab; Take 1 Tab by mouth every 8 hours as needed for up to 30 days.  -     morphine (MS IR) 15 MG tablet; Take 1 Tab by mouth 2 times a day as needed for Severe Pain for up to 30 days.  -     HYDROcodone/acetaminophen (NORCO)  MG Tab; Take 1 Tab by mouth every 8 hours as needed for up to 30 days.  -     morphine (MS IR) 15 MG tablet; Take 1 Tab by mouth 2 times a day as needed for Severe Pain for up to 30 days.  -     Consent for Opiate Prescription  -     Controlled Substance Treatment Agreement    History of gastric ulcer, avoid NSAIDs    History of gastric bypass, avoid NSAIDs    Nerve pain  -     pregabalin (LYRICA) 150 MG Cap; Take 1 Cap by mouth 3 times a  day for 30 days.        Pain is improving the patient is amenable to tapering.  Continue Norco 10, 3 times daily, as needed severe pain, 90 tabs per 30 days.  Taper morphine IR 15 mg from 90 tabs per month to 60 tabs per month over the next month, followed by 30 tabs per month for the following month.  We will plan to be off of morphine IR after the next 2 months.  Continue Lyrica    The patient's wife is a nurse practitioner and agrees to manage the patient's medications in a locked box.       Last opioid risk scale: 9/19/2019   Last urine drug screen: 9/19/2019, consistent  Last controlled substance agreement: 9/19/2019    reviewed: 12/11/2019   Naloxone prescribed: yes    Opioid Risk Score: 16    Interpretation of Opioid Risk Score   Score 0-3 = Low risk of abuse. Do UDS at least once per year.  Score 4-7 = Moderate risk of abuse. Do UDS 1-4 times per year.  Score 8+ = High risk of abuse. Refer to specialist.     I reviewed the     In prescribing controlled substances to this patient, I certify that I have obtained and reviewed the medical history of Richard Hubbard II. I have also made a good gabe effort to obtain applicable records from other providers who have treated the patient and records did not demonstrate any increased risk of substance abuse that would prevent me from prescribing controlled substances.     I have conducted a physical exam and documented it. I have reviewed Mr. Hubbard’s prescription history as maintained by the [place] Prescription Monitoring Program.     I have assessed the patient’s risk for abuse, dependency, and addiction using the validated Opioid Risk Tool available at https://www.mdcalc.com/deqfch-wywz-nojg-ort-narcotic-abuse.     Given the above, I believe the benefits of controlled substance therapy outweigh the risks. The reasons for prescribing controlled substances include non-narcotic, oral analgesic alternatives have been inadequate for pain control.  Accordingly, I have discussed the risk and benefits, treatment plan, and alternative therapies with the patient.               Follow up: two months    Thank you for allowing me to participate in the care of this patient. If you have any questions please not hesitate to contact me.        Please note that this dictation was created using voice recognition software. I have made every reasonable attempt to correct obvious errors but there may be errors of grammar and content that I may have overlooked prior to finalization of this note.      Gerardo Gutierrez MD  Interventional Spine and Sports Physiatry  Physical Medicine and Rehabilitation  [facility]

## 2019-12-16 ENCOUNTER — OFFICE VISIT (OUTPATIENT)
Dept: INFECTIOUS DISEASES | Facility: MEDICAL CENTER | Age: 44
End: 2019-12-16
Payer: COMMERCIAL

## 2019-12-16 VITALS
SYSTOLIC BLOOD PRESSURE: 116 MMHG | BODY MASS INDEX: 35.78 KG/M2 | TEMPERATURE: 97.9 F | HEART RATE: 85 BPM | OXYGEN SATURATION: 97 % | DIASTOLIC BLOOD PRESSURE: 70 MMHG | WEIGHT: 255.6 LBS | HEIGHT: 71 IN

## 2019-12-16 DIAGNOSIS — E66.9 OBESITY (BMI 35.0-39.9 WITHOUT COMORBIDITY): ICD-10-CM

## 2019-12-16 DIAGNOSIS — M05.741 RHEUMATOID ARTHRITIS INVOLVING BOTH HANDS WITH POSITIVE RHEUMATOID FACTOR (HCC): Chronic | ICD-10-CM

## 2019-12-16 DIAGNOSIS — S32.592S CLOSED FRACTURE OF RAMUS OF LEFT PUBIS, SEQUELA: ICD-10-CM

## 2019-12-16 DIAGNOSIS — M05.742 RHEUMATOID ARTHRITIS INVOLVING BOTH HANDS WITH POSITIVE RHEUMATOID FACTOR (HCC): Chronic | ICD-10-CM

## 2019-12-16 DIAGNOSIS — M86.18 OTHER ACUTE OSTEOMYELITIS, OTHER SITE (HCC): ICD-10-CM

## 2019-12-16 PROCEDURE — 99214 OFFICE O/P EST MOD 30 MIN: CPT | Performed by: INTERNAL MEDICINE

## 2019-12-16 RX ORDER — SULFAMETHOXAZOLE AND TRIMETHOPRIM 800; 160 MG/1; MG/1
1 TABLET ORAL 2 TIMES DAILY
Qty: 60 TAB | Refills: 1 | Status: SHIPPED | OUTPATIENT
Start: 2019-12-16 | End: 2020-01-16

## 2019-12-16 RX ORDER — SULFAMETHOXAZOLE AND TRIMETHOPRIM 800; 160 MG/1; MG/1
1 TABLET ORAL 2 TIMES DAILY
COMMUNITY
End: 2020-05-20

## 2019-12-16 ASSESSMENT — ENCOUNTER SYMPTOMS
NAUSEA: 0
SHORTNESS OF BREATH: 0
NERVOUS/ANXIOUS: 0
ABDOMINAL PAIN: 0
VOMITING: 0
HEADACHES: 0
SENSORY CHANGE: 1
CONSTIPATION: 1
FEVER: 0
SORE THROAT: 0
MYALGIAS: 1
TINGLING: 1
CHILLS: 0
DIARRHEA: 0
WEAKNESS: 1

## 2019-12-16 NOTE — PROGRESS NOTES
Infectious Disease Clinic    Subjective:     Chief Complaint   Patient presents with   • Follow-Up     Other acute osteomyelitis, unspecified site (HCC)     This is my first time meeting Mr. Hubbard.  Accompanied by his wife and daughter.    Interval History: 44-year-old male with a history of rheumatoid arthritis on Humira, obesity, chronic neck right knee plan awaiting right total knee arthroplasty and hospitalized on 9/13 for acute blood loss secondary to a upper GI bleed status post blood transfusion.  Hospitalized from 9/27-10/17/2019, admitted due to worsening left thigh pain.  Patient had sustained a fall at a store approximately 1 month PTA.  He was walking with a cane and his cane got caught on the floor mat resulting in a fall.  He did not hit his head; however he landed in a position where he sustained left groin and thigh pain.  He was initially seen at the urgent care in [place] and given a medication, no imaging was performed at that time.  Over the next several weeks, he noted worsening left thigh and leg pain, in addition to needing to rely on his cane more and more.  On presentation, he was afebrile without leukocytosis.  CT of the pelvis revealed a subacute fracture of the left superior pubic ramus near the symphysis and a complex masslike structure concerning for fluid-filled cavity consistent with a hematoma.  Due to increasing leukocytosis, repeat imaging was done, 2 additional complex areas within the left groin were found.  Blood cultures on 10/2 were negative.  CT-guided drainage of the abscesses was performed on 10/2, cultures positive MSSA.  Underwent I&D on 10/9 with Dr. Olivares, thickened periosteum and soft bone was encountered intraoperatively.  OR pathology was consistent with osteomyelitis and left pubis tissue culture was positive MSSA.  While hospitalized, he was also treated for pityriasis versicolor intertrigo.  Deceived 10 days of fluconazole, followed with miconazole cream.  It  was recommended that he remain on the topical therapy for 2 to 4 weeks. Discharged on IV Ancef 2 g every 8 hours x6 weeks, estimated end date 11/19/2019.    Hospital records reviewed    Seen by Paola JACOB, 11/15/2019: Home infusion through [facility] [place].  Patient reports feeling ok and has been tolerating the IV Ancef with minimal adverse effect.  Denies feeling generally ill, fevers/chills, general malaise, headache, n/v/d, abdominal pain or shortness of breath.  States that his lower abdominal surgical site is well-healed without any breakdown or drainage.  He has another follow-up with Dr. Olivares in roughly 3 weeks.  He notes that he has been constipated lately but attributes this to his pain medications.  Was hospitalized recently due to A. fib, noting that he was cardioverted and placed on Xarelto.  He has occasional chest pain off and on, but nothing on a persistent basis.  The rash he had during the hospitalization is completely resolved.  He continues to have left hip/groin/thigh pain that is fairly chronic, ranges anywhere from 3-5/10 described as a sharp achy sensation that is throbbing.  He also has some numbness and tingling while sitting up that goes down to his feet.  Notes that his feet often fall asleep.  He is a follow-up with a surgeon at the beginning of December regarding his right knee replacement.  Unfortunately, he is also having further issues with his right wrist/hand due to being off the Humira and starting to have contractures.  His hand doctor says that he will need surgery, plan will be for this to be done in January, before the right total knee arthroplasty.  12/16/2019-patient has come back for follow-up.  Ran out on Bactrim last week. Had GI appointment last week.   Review of Systems   Constitutional: Negative for chills, fever and malaise/fatigue.   HENT: Negative for sore throat.    Respiratory: Negative for shortness of breath.    Cardiovascular: Positive for leg swelling.  Negative for chest pain (Off-and-on).   Gastrointestinal: Positive for constipation. Negative for abdominal pain, diarrhea, nausea and vomiting.   Genitourinary: Negative for dysuria.   Musculoskeletal: Positive for joint pain and myalgias.        The hand pain continues.  Rt knee hurts  Feet hurt  Still has pain in the pelvis   Skin: Negative for itching and rash.   Neurological: Positive for tingling, sensory change and weakness. Negative for headaches.   Psychiatric/Behavioral: The patient is not nervous/anxious.        Past Medical History:   Diagnosis Date   • ADD (attention deficit disorder)    • Alcohol abuse    • Allergic rhinitis 2009   • Anemia 2019   • Anxiety 2009   • Back pain 2009   • Bipolar disorder (HCC)    • Bleeding ulcer 2009   • Depression 2009   • Eczema 2009   • History of bleeding ulcers 2015   • Hypertension    • Insomnia 2009   • Migraine 2009   • Obesity, morbid (HCC) 2009   • Pain 2019    Chronic knee and back pain   • Rheumatoid arteritis (HCC) 2019    knee, feet   • Sleep apnea 2009    Did not tolerate cpap, does not use it   • Snoring        Family History   Problem Relation Age of Onset   • Hypertension Mother    • Arthritis Mother    • Depression Mother    • Cancer Father         bladder   • Schizophrenia Father    • Cancer Maternal Grandmother         breast   • Heart Disease Maternal Grandmother    • Psychiatric Illness Other    • Stroke Maternal Aunt    • Other Neg Hx         no connective tissue disorders or known aortic disease       Social History     Tobacco Use   • Smoking status: Current Every Day Smoker     Packs/day: 0.25     Years: 3.00     Pack years: 0.75     Types: Cigarettes, Cigars     Last attempt to quit: 2019     Years since quittin.2   • Smokeless tobacco: Never Used   • Tobacco comment: occasional cigar   Substance Use Topics   • Alcohol use: No     Alcohol/week: 0.0 oz     Comment: quit  2.5 yrs ago   • Drug use: No       Allergies: Benadryl [altaryl]; Nsaids; Phenergan [promethazine hcl]; and Penicillins    Pt's medication and problem list reviewed.     Objective:     There were no vitals taken for this visit.    Physical Exam   Constitutional: He is oriented to person, place, and time and well-developed, well-nourished, and in no distress. No distress.   Obese   HENT:   Head: Normocephalic and atraumatic.   Mouth/Throat: Oropharynx is clear and moist. No oropharyngeal exudate.   Eyes: Pupils are equal, round, and reactive to light. Conjunctivae and EOM are normal.   Neck: Normal range of motion. Neck supple. No tracheal deviation present.   Cardiovascular: Normal rate and regular rhythm.   Murmur heard.  Pulmonary/Chest: Effort normal and breath sounds normal. No respiratory distress. He has no wheezes. He has no rales.   Abdominal: Soft. Bowel sounds are normal. He exhibits no distension. There is no tenderness.   Protuberant    Lower mid abdomen, surgical site-well-healed and approximated without any breakdown, no erythema, no active drainage, nontender to palpation.   Musculoskeletal: Normal range of motion.         General: Edema (Trace BLE) present.      Comments: Valgus deformity of the right leg    Left medial thigh-slightly tender to palpation, no induration or fluctuance noted.   Neurological: He is alert and oriented to person, place, and time. No cranial nerve deficit.   Wheelchair   Skin: Skin is warm and dry. No rash noted. He is not diaphoretic. No erythema.   Psychiatric: Mood, memory, affect and judgment normal.   Pleasant   Vitals reviewed.      Labs:  WBC   Date/Time Value Ref Range Status   11/08/2019 04:00 AM 6.0 4.8 - 10.8 K/uL Final   10/19/2010 12:05 PM 8.4 4.0 - 10.5 x10E3/uL Final     RBC   Date/Time Value Ref Range Status   11/08/2019 04:00 AM 3.82 (L) 4.70 - 6.10 M/uL Final   10/19/2010 12:05 PM 4.96 4.20 - 6.00 x10E6/uL Final     Hemoglobin   Date/Time Value Ref Range  Status   11/08/2019 04:00 AM 11.2 (L) 14.0 - 18.0 g/dL Final     Hematocrit   Date/Time Value Ref Range Status   11/08/2019 04:00 AM 36.7 (L) 42.0 - 52.0 % Final     MCV   Date/Time Value Ref Range Status   11/08/2019 04:00 AM 96.1 81.4 - 97.8 fL Final   10/19/2010 12:05 PM 85 80 - 98 fL Final     MCH   Date/Time Value Ref Range Status   11/08/2019 04:00 AM 29.3 27.0 - 33.0 pg Final   10/19/2010 12:05 PM 28.4 27.0 - 34.0 pg Final     MCHC   Date/Time Value Ref Range Status   11/08/2019 04:00 AM 30.5 (L) 33.7 - 35.3 g/dL Final     MPV   Date/Time Value Ref Range Status   11/08/2019 04:00 AM 9.0 9.0 - 12.9 fL Final        Sodium   Date/Time Value Ref Range Status   11/16/2019 10:30  135 - 145 mmol/L Final     Potassium   Date/Time Value Ref Range Status   11/16/2019 10:30 AM 4.0 3.6 - 5.5 mmol/L Final     Chloride   Date/Time Value Ref Range Status   11/16/2019 10:30  96 - 112 mmol/L Final     Co2   Date/Time Value Ref Range Status   11/16/2019 10:30 AM 27 20 - 33 mmol/L Final     Glucose   Date/Time Value Ref Range Status   11/16/2019 10:30 AM 74 65 - 99 mg/dL Final     Bun   Date/Time Value Ref Range Status   11/16/2019 10:30 AM 13 8 - 22 mg/dL Final     Creatinine   Date/Time Value Ref Range Status   11/16/2019 10:30 AM 0.58 0.50 - 1.40 mg/dL Final   10/19/2010 12:05 PM 0.95 0.76 - 1.27 mg/dL Final     Bun-Creatinine Ratio   Date/Time Value Ref Range Status   10/19/2010 12:05 PM 9 8 - 27 Final     Glom Filt Rate, Est   Date/Time Value Ref Range Status   10/19/2010 12:05 PM >59 >59 mL/min/1.73 Final       Alkaline Phosphatase   Date/Time Value Ref Range Status   11/16/2019 10:30 AM 74 30 - 99 U/L Final     AST(SGOT)   Date/Time Value Ref Range Status   11/16/2019 10:30 AM 24 12 - 45 U/L Final     ALT(SGPT)   Date/Time Value Ref Range Status   11/16/2019 10:30 AM 9 2 - 50 U/L Final     Total Bilirubin   Date/Time Value Ref Range Status   11/16/2019 10:30 AM 0.2 0.1 - 1.5 mg/dL Final       Imagin19   MR-PELVIS-WITH & W/O AND SEQUENCES   FINDINGS:  Bilateral zone 1 sacral bandlike decreased T1 and T2 signal with adjacent increased T2 and decreased T1 signal is identified. There is cortical buckling. This affects S1 and S2. No adjacent hematoma. This is likely subacute     Left parasymphyseal decreased T1 and increased T2 signal compatible with subacute fracture is identified. This is partially seen on the comparison and there has been some decrease in the T2 hyperintensity. Adjacent to the superior ramus component of the   fracture is a 23 x 7 x 5 mm T1 and T2 hyperintense process compatible with small hematoma, further supported by a hemosiderin rim     No additional fracture is seen.     No skin breakdown is seen. There is no deep ulceration. No decubitus ulcer is identified.     No greater trochanteric or other bursa     Left hip rotator cuff musculature edema is greatly diminished     No abnormal joint effusion     After contrast is administered there is no abnormal enhancement suspicious for osteomyelitis/decubitus ulcer. There is enhancement along the left parasymphyseal and sacral fractures as expected. Superimposed infection especially in the left   parasymphyseal region cannot be excluded but is not expected given there has been some improvement in the edema in the vicinity.     Assessment and Plan:   The following treatment plan was discussed with patient at length:    1. Other acute osteomyelitis, unspecified site ([facility])     Continue with the p.o. Bactrim  Aim for at least 3 to 6 months                2. Closed fracture of ramus of left pubis, initial encounter ([facility])      Follow-up with Ortho surgeon as directed.  Antibiotics as above.   3. MSSA (methicillin susceptible Staphylococcus aureus) infection      As above   4. Abscess of left thigh      MRI showing nearly resolved, now 23 x 7 x 5 mm.  Antibiotics as above.   5. Rheumatoid arthritis involving both hands with positive  rheumatoid factor (HCC)      Follow-up with rheumatologist as directed.  On Humira (currently on hold-check with rheumatologist before resuming; however, Bactrim and Humira are known to have negative interactions together.  May need to transition patient to p.o. Augmentin or doxycycline if he needs to resume Humira sooner than later.), chronically immunosuppressed on steroids.  At risk for future infections.   6. Chronic pain of right knee      FU with Ortho for future Right TKA   7. Intertrigo      Resolved   8. Atrial fibrillation with rapid ventricular response (HCC)      On Xarelto     Follow up: 2 months, RTC sooner if needed. FU with PCP for ongoing chronic medical conditions.     Candelaria Sanchez M.D.       Please note that this dictation was created using voice recognition software. I have  worked with technical experts from [facility]  [email or web address] to optimize the interface.  I have made every reasonable attempt to correct obvious errors, but there may be errors of grammar and possibly content that I did not discover before finalizing the note.

## 2019-12-20 ENCOUNTER — TELEPHONE (OUTPATIENT)
Dept: MEDICAL GROUP | Facility: MEDICAL CENTER | Age: 44
End: 2019-12-20

## 2019-12-20 NOTE — TELEPHONE ENCOUNTER
VOICEMAIL  1. Caller Name: Sunil (Johan)                      Call Back Number: [phone]    2. Message: Pt is being discharge from home health due to uncompliant for [place] services. Pt does agree as well.    3. Patient approves office to leave a detailed voicemail/MyChart message: N\A

## 2020-01-22 ENCOUNTER — APPOINTMENT (OUTPATIENT)
Dept: RADIOLOGY | Facility: MEDICAL CENTER | Age: 45
End: 2020-01-22
Attending: ORTHOPAEDIC SURGERY
Payer: COMMERCIAL

## 2020-01-22 ENCOUNTER — ANESTHESIA EVENT (OUTPATIENT)
Dept: SURGERY | Facility: MEDICAL CENTER | Age: 45
End: 2020-01-22
Payer: COMMERCIAL

## 2020-01-22 ENCOUNTER — ANESTHESIA (OUTPATIENT)
Dept: SURGERY | Facility: MEDICAL CENTER | Age: 45
End: 2020-01-22
Payer: COMMERCIAL

## 2020-01-22 ENCOUNTER — HOSPITAL ENCOUNTER (OUTPATIENT)
Facility: MEDICAL CENTER | Age: 45
End: 2020-01-22
Attending: ORTHOPAEDIC SURGERY | Admitting: ORTHOPAEDIC SURGERY
Payer: COMMERCIAL

## 2020-01-22 ENCOUNTER — TELEPHONE (OUTPATIENT)
Dept: PHYSICAL MEDICINE AND REHAB | Facility: MEDICAL CENTER | Age: 45
End: 2020-01-22

## 2020-01-22 VITALS
OXYGEN SATURATION: 95 % | DIASTOLIC BLOOD PRESSURE: 94 MMHG | HEART RATE: 80 BPM | WEIGHT: 242.06 LBS | TEMPERATURE: 98.1 F | RESPIRATION RATE: 16 BRPM | BODY MASS INDEX: 33.89 KG/M2 | SYSTOLIC BLOOD PRESSURE: 133 MMHG | HEIGHT: 71 IN

## 2020-01-22 DIAGNOSIS — M05.741 RHEUMATOID ARTHRITIS INVOLVING BOTH HANDS WITH POSITIVE RHEUMATOID FACTOR (HCC): Chronic | ICD-10-CM

## 2020-01-22 DIAGNOSIS — M05.742 RHEUMATOID ARTHRITIS INVOLVING BOTH HANDS WITH POSITIVE RHEUMATOID FACTOR (HCC): Chronic | ICD-10-CM

## 2020-01-22 LAB
ANION GAP SERPL CALC-SCNC: 5 MMOL/L (ref 0–11.9)
BUN SERPL-MCNC: 10 MG/DL (ref 8–22)
CALCIUM SERPL-MCNC: 8.6 MG/DL (ref 8.5–10.5)
CHLORIDE SERPL-SCNC: 102 MMOL/L (ref 96–112)
CO2 SERPL-SCNC: 28 MMOL/L (ref 20–33)
CREAT SERPL-MCNC: 0.91 MG/DL (ref 0.5–1.4)
ERYTHROCYTE [DISTWIDTH] IN BLOOD BY AUTOMATED COUNT: 44.6 FL (ref 35.9–50)
GLUCOSE SERPL-MCNC: 84 MG/DL (ref 65–99)
HCT VFR BLD AUTO: 42 % (ref 42–52)
HGB BLD-MCNC: 13.1 G/DL (ref 14–18)
MCH RBC QN AUTO: 28.3 PG (ref 27–33)
MCHC RBC AUTO-ENTMCNC: 31.2 G/DL (ref 33.7–35.3)
MCV RBC AUTO: 90.7 FL (ref 81.4–97.8)
PLATELET # BLD AUTO: 290 K/UL (ref 164–446)
PMV BLD AUTO: 8.5 FL (ref 9–12.9)
POTASSIUM SERPL-SCNC: 4.2 MMOL/L (ref 3.6–5.5)
RBC # BLD AUTO: 4.63 M/UL (ref 4.7–6.1)
SODIUM SERPL-SCNC: 135 MMOL/L (ref 135–145)
WBC # BLD AUTO: 5.9 K/UL (ref 4.8–10.8)

## 2020-01-22 PROCEDURE — 160009 HCHG ANES TIME/MIN: Performed by: ORTHOPAEDIC SURGERY

## 2020-01-22 PROCEDURE — 64415 NJX AA&/STRD BRCH PLXS IMG: CPT | Performed by: ORTHOPAEDIC SURGERY

## 2020-01-22 PROCEDURE — 700101 HCHG RX REV CODE 250: Performed by: ORTHOPAEDIC SURGERY

## 2020-01-22 PROCEDURE — A9270 NON-COVERED ITEM OR SERVICE: HCPCS | Performed by: ANESTHESIOLOGY

## 2020-01-22 PROCEDURE — 160039 HCHG SURGERY MINUTES - EA ADDL 1 MIN LEVEL 3: Performed by: ORTHOPAEDIC SURGERY

## 2020-01-22 PROCEDURE — 85027 COMPLETE CBC AUTOMATED: CPT

## 2020-01-22 PROCEDURE — 700111 HCHG RX REV CODE 636 W/ 250 OVERRIDE (IP)

## 2020-01-22 PROCEDURE — 501838 HCHG SUTURE GENERAL: Performed by: ORTHOPAEDIC SURGERY

## 2020-01-22 PROCEDURE — 160028 HCHG SURGERY MINUTES - 1ST 30 MINS LEVEL 3: Performed by: ORTHOPAEDIC SURGERY

## 2020-01-22 PROCEDURE — 160046 HCHG PACU - 1ST 60 MINS PHASE II: Performed by: ORTHOPAEDIC SURGERY

## 2020-01-22 PROCEDURE — 160002 HCHG RECOVERY MINUTES (STAT): Performed by: ORTHOPAEDIC SURGERY

## 2020-01-22 PROCEDURE — 160025 RECOVERY II MINUTES (STATS): Performed by: ORTHOPAEDIC SURGERY

## 2020-01-22 PROCEDURE — 700105 HCHG RX REV CODE 258: Performed by: ORTHOPAEDIC SURGERY

## 2020-01-22 PROCEDURE — 700102 HCHG RX REV CODE 250 W/ 637 OVERRIDE(OP): Performed by: ANESTHESIOLOGY

## 2020-01-22 PROCEDURE — 160035 HCHG PACU - 1ST 60 MINS PHASE I: Performed by: ORTHOPAEDIC SURGERY

## 2020-01-22 PROCEDURE — 160036 HCHG PACU - EA ADDL 30 MINS PHASE I: Performed by: ORTHOPAEDIC SURGERY

## 2020-01-22 PROCEDURE — 500112 HCHG BONE WAX: Performed by: ORTHOPAEDIC SURGERY

## 2020-01-22 PROCEDURE — 700111 HCHG RX REV CODE 636 W/ 250 OVERRIDE (IP): Performed by: ANESTHESIOLOGY

## 2020-01-22 PROCEDURE — A4565 SLINGS: HCPCS | Performed by: ORTHOPAEDIC SURGERY

## 2020-01-22 PROCEDURE — 80048 BASIC METABOLIC PNL TOTAL CA: CPT

## 2020-01-22 PROCEDURE — 160048 HCHG OR STATISTICAL LEVEL 1-5: Performed by: ORTHOPAEDIC SURGERY

## 2020-01-22 PROCEDURE — 700101 HCHG RX REV CODE 250: Performed by: ANESTHESIOLOGY

## 2020-01-22 RX ORDER — MIDAZOLAM HYDROCHLORIDE 1 MG/ML
INJECTION INTRAMUSCULAR; INTRAVENOUS PRN
Status: DISCONTINUED | OUTPATIENT
Start: 2020-01-22 | End: 2020-01-22 | Stop reason: SURG

## 2020-01-22 RX ORDER — MEPERIDINE HYDROCHLORIDE 25 MG/ML
12.5 INJECTION INTRAMUSCULAR; INTRAVENOUS; SUBCUTANEOUS
Status: DISCONTINUED | OUTPATIENT
Start: 2020-01-22 | End: 2020-01-22 | Stop reason: HOSPADM

## 2020-01-22 RX ORDER — ONDANSETRON 2 MG/ML
INJECTION INTRAMUSCULAR; INTRAVENOUS PRN
Status: DISCONTINUED | OUTPATIENT
Start: 2020-01-22 | End: 2020-01-22 | Stop reason: SURG

## 2020-01-22 RX ORDER — OXYCODONE HCL 5 MG/5 ML
10 SOLUTION, ORAL ORAL
Status: COMPLETED | OUTPATIENT
Start: 2020-01-22 | End: 2020-01-22

## 2020-01-22 RX ORDER — LABETALOL HYDROCHLORIDE 5 MG/ML
5 INJECTION, SOLUTION INTRAVENOUS
Status: DISCONTINUED | OUTPATIENT
Start: 2020-01-22 | End: 2020-01-22 | Stop reason: HOSPADM

## 2020-01-22 RX ORDER — HYDROMORPHONE HYDROCHLORIDE 2 MG/ML
INJECTION, SOLUTION INTRAMUSCULAR; INTRAVENOUS; SUBCUTANEOUS PRN
Status: DISCONTINUED | OUTPATIENT
Start: 2020-01-22 | End: 2020-01-22 | Stop reason: SURG

## 2020-01-22 RX ORDER — DEXAMETHASONE SODIUM PHOSPHATE 4 MG/ML
INJECTION, SOLUTION INTRA-ARTICULAR; INTRALESIONAL; INTRAMUSCULAR; INTRAVENOUS; SOFT TISSUE PRN
Status: DISCONTINUED | OUTPATIENT
Start: 2020-01-22 | End: 2020-01-22 | Stop reason: SURG

## 2020-01-22 RX ORDER — HYDROMORPHONE HYDROCHLORIDE 1 MG/ML
0.1 INJECTION, SOLUTION INTRAMUSCULAR; INTRAVENOUS; SUBCUTANEOUS
Status: DISCONTINUED | OUTPATIENT
Start: 2020-01-22 | End: 2020-01-22 | Stop reason: HOSPADM

## 2020-01-22 RX ORDER — HYDROMORPHONE HYDROCHLORIDE 1 MG/ML
0.2 INJECTION, SOLUTION INTRAMUSCULAR; INTRAVENOUS; SUBCUTANEOUS
Status: DISCONTINUED | OUTPATIENT
Start: 2020-01-22 | End: 2020-01-22 | Stop reason: HOSPADM

## 2020-01-22 RX ORDER — LIDOCAINE HYDROCHLORIDE 10 MG/ML
INJECTION, SOLUTION EPIDURAL; INFILTRATION; INTRACAUDAL; PERINEURAL
Status: COMPLETED
Start: 2020-01-22 | End: 2020-01-22

## 2020-01-22 RX ORDER — HYDRALAZINE HYDROCHLORIDE 20 MG/ML
5 INJECTION INTRAMUSCULAR; INTRAVENOUS
Status: DISCONTINUED | OUTPATIENT
Start: 2020-01-22 | End: 2020-01-22 | Stop reason: HOSPADM

## 2020-01-22 RX ORDER — DEXAMETHASONE SODIUM PHOSPHATE 4 MG/ML
INJECTION, SOLUTION INTRA-ARTICULAR; INTRALESIONAL; INTRAMUSCULAR; INTRAVENOUS; SOFT TISSUE
Status: COMPLETED | OUTPATIENT
Start: 2020-01-22 | End: 2020-01-22

## 2020-01-22 RX ORDER — HYDROMORPHONE HYDROCHLORIDE 1 MG/ML
0.4 INJECTION, SOLUTION INTRAMUSCULAR; INTRAVENOUS; SUBCUTANEOUS
Status: DISCONTINUED | OUTPATIENT
Start: 2020-01-22 | End: 2020-01-22 | Stop reason: HOSPADM

## 2020-01-22 RX ORDER — CEFAZOLIN SODIUM 1 G/3ML
INJECTION, POWDER, FOR SOLUTION INTRAMUSCULAR; INTRAVENOUS PRN
Status: DISCONTINUED | OUTPATIENT
Start: 2020-01-22 | End: 2020-01-22 | Stop reason: SURG

## 2020-01-22 RX ORDER — SODIUM CHLORIDE, SODIUM LACTATE, POTASSIUM CHLORIDE, CALCIUM CHLORIDE 600; 310; 30; 20 MG/100ML; MG/100ML; MG/100ML; MG/100ML
INJECTION, SOLUTION INTRAVENOUS CONTINUOUS
Status: DISCONTINUED | OUTPATIENT
Start: 2020-01-22 | End: 2020-01-22 | Stop reason: HOSPADM

## 2020-01-22 RX ORDER — LIDOCAINE HYDROCHLORIDE 20 MG/ML
INJECTION, SOLUTION EPIDURAL; INFILTRATION; INTRACAUDAL; PERINEURAL PRN
Status: DISCONTINUED | OUTPATIENT
Start: 2020-01-22 | End: 2020-01-22 | Stop reason: SURG

## 2020-01-22 RX ORDER — OXYCODONE HCL 5 MG/5 ML
5 SOLUTION, ORAL ORAL
Status: COMPLETED | OUTPATIENT
Start: 2020-01-22 | End: 2020-01-22

## 2020-01-22 RX ORDER — ROPIVACAINE HYDROCHLORIDE 5 MG/ML
INJECTION, SOLUTION EPIDURAL; INFILTRATION; PERINEURAL
Status: COMPLETED | OUTPATIENT
Start: 2020-01-22 | End: 2020-01-22

## 2020-01-22 RX ORDER — ONDANSETRON 2 MG/ML
4 INJECTION INTRAMUSCULAR; INTRAVENOUS
Status: DISCONTINUED | OUTPATIENT
Start: 2020-01-22 | End: 2020-01-22 | Stop reason: HOSPADM

## 2020-01-22 RX ADMIN — FENTANYL CITRATE 50 MCG: 50 INJECTION, SOLUTION INTRAMUSCULAR; INTRAVENOUS at 07:13

## 2020-01-22 RX ADMIN — ONDANSETRON 4 MG: 2 INJECTION INTRAMUSCULAR; INTRAVENOUS at 09:27

## 2020-01-22 RX ADMIN — FENTANYL CITRATE 50 MCG: 50 INJECTION, SOLUTION INTRAMUSCULAR; INTRAVENOUS at 07:25

## 2020-01-22 RX ADMIN — HYDROMORPHONE HYDROCHLORIDE 0.2 MG: 1 INJECTION, SOLUTION INTRAMUSCULAR; INTRAVENOUS; SUBCUTANEOUS at 10:51

## 2020-01-22 RX ADMIN — FENTANYL CITRATE 50 MCG: 50 INJECTION, SOLUTION INTRAMUSCULAR; INTRAVENOUS at 07:20

## 2020-01-22 RX ADMIN — SODIUM CHLORIDE, POTASSIUM CHLORIDE, SODIUM LACTATE AND CALCIUM CHLORIDE: 600; 310; 30; 20 INJECTION, SOLUTION INTRAVENOUS at 06:16

## 2020-01-22 RX ADMIN — DEXAMETHASONE SODIUM PHOSPHATE 2 MG: 4 INJECTION, SOLUTION INTRA-ARTICULAR; INTRALESIONAL; INTRAMUSCULAR; INTRAVENOUS; SOFT TISSUE at 06:50

## 2020-01-22 RX ADMIN — OXYCODONE HYDROCHLORIDE 10 MG: 5 SOLUTION ORAL at 10:02

## 2020-01-22 RX ADMIN — HYDROMORPHONE HYDROCHLORIDE 1 MG: 2 INJECTION, SOLUTION INTRAMUSCULAR; INTRAVENOUS; SUBCUTANEOUS at 09:48

## 2020-01-22 RX ADMIN — HYDROMORPHONE HYDROCHLORIDE 1 MG: 2 INJECTION, SOLUTION INTRAMUSCULAR; INTRAVENOUS; SUBCUTANEOUS at 09:51

## 2020-01-22 RX ADMIN — ROPIVACAINE HYDROCHLORIDE 25 ML: 5 INJECTION, SOLUTION EPIDURAL; INFILTRATION; PERINEURAL at 06:50

## 2020-01-22 RX ADMIN — DEXAMETHASONE SODIUM PHOSPHATE 8 MG: 4 INJECTION, SOLUTION INTRA-ARTICULAR; INTRALESIONAL; INTRAMUSCULAR; INTRAVENOUS; SOFT TISSUE at 07:20

## 2020-01-22 RX ADMIN — FENTANYL CITRATE 50 MCG: 0.05 INJECTION, SOLUTION INTRAMUSCULAR; INTRAVENOUS at 10:15

## 2020-01-22 RX ADMIN — FENTANYL CITRATE 50 MCG: 50 INJECTION, SOLUTION INTRAMUSCULAR; INTRAVENOUS at 09:40

## 2020-01-22 RX ADMIN — FENTANYL CITRATE 50 MCG: 50 INJECTION, SOLUTION INTRAMUSCULAR; INTRAVENOUS at 09:45

## 2020-01-22 RX ADMIN — LIDOCAINE HYDROCHLORIDE 5 ML: 10 INJECTION, SOLUTION EPIDURAL; INFILTRATION; INTRACAUDAL at 06:17

## 2020-01-22 RX ADMIN — SODIUM CHLORIDE, POTASSIUM CHLORIDE, SODIUM LACTATE AND CALCIUM CHLORIDE: 600; 310; 30; 20 INJECTION, SOLUTION INTRAVENOUS at 08:58

## 2020-01-22 RX ADMIN — FENTANYL CITRATE 50 MCG: 50 INJECTION, SOLUTION INTRAMUSCULAR; INTRAVENOUS at 07:52

## 2020-01-22 RX ADMIN — HYDROMORPHONE HYDROCHLORIDE 0.4 MG: 1 INJECTION, SOLUTION INTRAMUSCULAR; INTRAVENOUS; SUBCUTANEOUS at 10:33

## 2020-01-22 RX ADMIN — HYDROMORPHONE HYDROCHLORIDE 0.4 MG: 1 INJECTION, SOLUTION INTRAMUSCULAR; INTRAVENOUS; SUBCUTANEOUS at 10:20

## 2020-01-22 RX ADMIN — FENTANYL CITRATE 50 MCG: 50 INJECTION, SOLUTION INTRAMUSCULAR; INTRAVENOUS at 08:29

## 2020-01-22 RX ADMIN — FENTANYL CITRATE 50 MCG: 0.05 INJECTION, SOLUTION INTRAMUSCULAR; INTRAVENOUS at 10:04

## 2020-01-22 RX ADMIN — PROPOFOL 120 MG: 10 INJECTION, EMULSION INTRAVENOUS at 07:13

## 2020-01-22 RX ADMIN — MIDAZOLAM HYDROCHLORIDE 2 MG: 1 INJECTION, SOLUTION INTRAMUSCULAR; INTRAVENOUS at 07:08

## 2020-01-22 RX ADMIN — LIDOCAINE HYDROCHLORIDE 30 MG: 20 INJECTION, SOLUTION EPIDURAL; INFILTRATION; INTRACAUDAL at 07:13

## 2020-01-22 RX ADMIN — CEFAZOLIN 2 G: 330 INJECTION, POWDER, FOR SOLUTION INTRAMUSCULAR; INTRAVENOUS at 07:15

## 2020-01-22 ASSESSMENT — PAIN SCALES - GENERAL: PAIN_LEVEL: 5

## 2020-01-22 NOTE — ANESTHESIA POSTPROCEDURE EVALUATION
Patient: Richard Hubbard II    Procedure Summary     Date:  01/22/20 Room / Location:  [place] [address] / SURGERY [facility]    Anesthesia Start:  0708 Anesthesia Stop:  1003    Procedure:  RIGHT DISTAL ULNA RESECTION AND EXTENSOR TENDON TRANSFER (Right Wrist) Diagnosis:  (NONTRAUMATIC RUPTURE OF EXTENSOR TENDON RIGHT WRIST)    Surgeon:  Marine Rushing M.D. Responsible Provider:  Irving Cordova M.D.    Anesthesia Type:  general ASA Status:  3          Final Anesthesia Type: general  Last vitals  BP   Blood Pressure: 133/94    Temp   36.7 °C (98.1 °F)    Pulse   Pulse: 80   Resp   16    SpO2   95 %      Anesthesia Post Evaluation    Patient location during evaluation: PACU  Patient participation: complete - patient participated  Level of consciousness: awake and alert  Pain score: 5    Airway patency: patent  Anesthetic complications: no  Cardiovascular status: hemodynamically stable  Respiratory status: acceptable  Hydration status: euvolemic    PONV: none           Nurse Pain Score: 5 (NPRS)

## 2020-01-22 NOTE — ANESTHESIA TIME REPORT
Anesthesia Start and Stop Event Times     Date Time Event    1/22/2020 0637 Ready for Procedure     0708 Anesthesia Start     1003 Anesthesia Stop        Responsible Staff  01/22/20    Name Role Begin End    Irving Cordova M.D. Anesth 0708 1003        Preop Diagnosis (Free Text):  Pre-op Diagnosis     NONTRAUMATIC RUPTURE OF EXTENSOR TENDON RIGHT WRIST        Preop Diagnosis (Codes):    Post op Diagnosis  Hand and wrist extensor tendon rupture, right, initial encounter      Premium Reason  A. 3PM - 7AM    Comments: pre op block 0650 (Axillary)

## 2020-01-22 NOTE — PROGRESS NOTES
1125- pt in phase 2 recovery, vss, pt right arm/wrist in cast. pacu nurse/phase 2 nurse and pt discussed together plan for pain control.  Pt agrees he will go home and resume home medication pain regimen. At this time pt has agreed for plan.  Pt CMS wnl. Able to wiggle fingers.     1130- wife and pt informed of dc instructions/ s/s to report.     1137-pt using urinal, wife assisting pt to get dressed.    1200- pt offers no complaints, safe to dc home.

## 2020-01-22 NOTE — TELEPHONE ENCOUNTER
The patient has a pain contract with me, however pain meds need post procedure need to come from the surgeon for the post op period until cleared by the surgeon, typically around the 4-6 week danish and after the follow up visits.       Arlet, please print this on our letterhead and I can sign this and fax this to the patient's surgeon. Thank you

## 2020-01-22 NOTE — TELEPHONE ENCOUNTER
Pt wife called because her spouse had a surgery today for his Right Ulna resection and tendon transfer with Dr. Rushing and he is a surgeon in [place].    Pt wife mentioned that the surgeon didn't prescribe pain medication to the Pt due to Pt has a contract with us.  Pt spouse mentioned that the Pt is in pain right now and the current dosing of his Norco 10/325mg ( 1 tab every 8 hrs) is not helping with his pain.    Pt upcoming appt on 2/12/2020.      Pt spouse if he can increase the dosage on his med.    She mentioned also if you have question from his Surgeon you can call him, Dr. Marine Rushing in [place].    Please Advise    Thank you  Arlet

## 2020-01-22 NOTE — ANESTHESIA PROCEDURE NOTES
Peripheral Block  Date/Time: 1/22/2020 6:50 AM  Performed by: Irving Cordova M.D.  Authorized by: Irving Cordova M.D.     Start Time:  1/22/2020 6:50 AM  End Time:  1/22/2020 7:00 AM  Reason for Block: at surgeon's request and post-op pain management    patient identified, IV checked, site marked, risks and benefits discussed, surgical consent, monitors and equipment checked, pre-op evaluation and timeout performed    Patient Position:  Supine  Prep: ChloraPrep    Monitoring:  Heart rate, continuous pulse ox and cardiac monitor  Block Region:  Upper Extremity  Upper Extremity - Block Type:  BRACHIAL PLEXUS block, axillary approach    Laterality:  Right  Procedures: ultrasound guided  Image captured, interpreted and electronically stored.  Local Infiltration:  Lidocaine  Strength:  2 %  Dose:  3 ml  Block Type:  Single-shot  Needle Length:  100mm  Needle Gauge:  21 G  Needle Localization:  Ultrasound guidance  Injection Assessment:  Negative aspiration for heme, no paresthesia on injection, incremental injection and local visualized surrounding nerve on ultrasound  Evidence of intravascular injection: No     US Guided Axillary Block   US probe placed in axilla at intersection of pec major and biceps muscles.  Axillary Artery (AA) identified with Median (superficial), Radial (deep) and Ulnar (caudad) nerves surrounding artery.  Needle inserted cephalad to probe in an in plane approach to a perivascular/perineural position.  After negative aspiration LA injected with ease and visualized surrounding the artery and nerves.  Probe moved cephalad to identify corcobrachialis muscle and Musculocutaneous Nerve (MCN) within muscle belly. Needle inserted cephalad to probe in an in plane approach to a perineural position.  After negative aspiration LA injected with ease and visualized surrounding MCN.    Lot [id number] Exp 07/23

## 2020-01-22 NOTE — DISCHARGE INSTRUCTIONS
ACTIVITY: Rest and take it easy for the first 24 hours.  A responsible adult is recommended to remain with you during that time.  It is normal to feel sleepy.  We encourage you to not do anything that requires balance, judgment or coordination.    MILD FLU-LIKE SYMPTOMS ARE NORMAL. YOU MAY EXPERIENCE GENERALIZED MUSCLE ACHES, THROAT IRRITATION, HEADACHE AND/OR SOME NAUSEA.    FOR 24 HOURS DO NOT:  Drive, operate machinery or run household appliances.  Drink beer or alcoholic beverages.   Make important decisions or sign legal documents.     SPECIAL INSTRUCTIONS:    Non-weight bearing to Right arm    Leave ace wrap in place   Keep cast clean and dry   Pain regimen per pain contract with outside doctor     DIET: To avoid nausea, slowly advance diet as tolerated, avoiding spicy or greasy foods for the first day.  Add more substantial food to your diet according to your physician's instructions.  Babies can be fed formula or breast milk as soon as they are hungry.  INCREASE FLUIDS AND FIBER TO AVOID CONSTIPATION.    SURGICAL DRESSING/BATHING: May shower with the incision covered, no submerging in water until follow-up. Keep dressing clean, dry and intact until follow-up    FOLLOW-UP APPOINTMENT:  A follow-up appointment should be arranged with your doctor in 1-2 weeks; call to schedule.    You should CALL YOUR PHYSICIAN if you develop:  Fever greater than 101 degrees F.  Pain not relieved by medication, or persistent nausea or vomiting.  Excessive bleeding (blood soaking through dressing) or unexpected drainage from the wound.  Extreme redness or swelling around the incision site, drainage of pus or foul smelling drainage.  Inability to urinate or empty your bladder within 8 hours.  Problems with breathing or chest pain.    You should call 911 if you develop problems with breathing or chest pain.  If you are unable to contact your doctor or surgical center, you should go to the nearest emergency room or urgent care  center.  Physician's telephone #: [phone]    If any questions arise, call your doctor.  If your doctor is not available, please feel free to call the Surgical Center at [phone].  The Center is open Monday through Friday from 7AM to 7PM.  You can also call the HEALTH HOTLINE open 24 hours/day, 7 days/week and speak to a nurse at [phone], or toll free at [phone].    A registered nurse may call you a few days after your surgery to see how you are doing after your procedure.    MEDICATIONS: Resume taking daily medication.  Take prescribed pain medication with food.  If no medication is prescribed, you may take non-aspirin pain medication if needed.  PAIN MEDICATION CAN BE VERY CONSTIPATING.  Take a stool softener or laxative such as senokot, pericolace, or milk of magnesia if needed.    Prescription given for none.  Last pain medication given at 10:02 AM, next dose may be taken around 2:02 PM.    If your physician has prescribed pain medication that includes Acetaminophen (Tylenol), do not take additional Acetaminophen (Tylenol) while taking the prescribed medication.    Depression / Suicide Risk    As you are discharged from this [facility] facility, it is important to learn how to keep safe from harming yourself.    Recognize the warning signs:  · Abrupt changes in personality, positive or negative- including increase in energy   · Giving away possessions  · Change in eating patterns- significant weight changes-  positive or negative  · Change in sleeping patterns- unable to sleep or sleeping all the time   · Unwillingness or inability to communicate  · Depression  · Unusual sadness, discouragement and loneliness  · Talk of wanting to die  · Neglect of personal appearance   · Rebelliousness- reckless behavior  · Withdrawal from people/activities they love  · Confusion- inability to concentrate     If you or a loved one observes any of these behaviors or has concerns about self-harm, here's  what you can do:  · Talk about it- your feelings and reasons for harming yourself  · Remove any means that you might use to hurt yourself (examples: pills, rope, extension cords, firearm)  · Get professional help from the community (Mental Health, Substance Abuse, psychological counseling)  · Do not be alone:Call your Safe Contact- someone whom you trust who will be there for you.  · Call your local CRISIS HOTLINE 728-4572 or 882-134-7072  · Call your local Children's Mobile Crisis Response Team [place] (817) 884-3237 or www.[employer]  · Call the toll free National Suicide Prevention Hotlines   · National Suicide Prevention Lifeline 338-085-OKII (5620)  · National Hope Line Network 800-SUICIDE (725-7588)

## 2020-01-22 NOTE — OP REPORT
Orthopedic Surgery Operative Report    Pre-operative Diagnosis:  Extensor tendon rupture, right small finger  Extensor tendon rupture, right ring finger   Caput ulna, right     Post-operative Diagnosis:  Extensor tendon rupture, right small finger  Extensor tendon rupture, right ring finger   Caput ulna, right  DRUJ arthritis   Tenosynovitis, extensor tendons     Procedures Performed:  EIP to EDQ transfer  Tenodesis of EDC to ring to EDC to long   Darrach   Tenosynovectomy, extensor tendons    Anesthesia:  General with block    Anesthesiologist:  Art     Surgeon:  Marine Rushing M.D.     Assistants:  None    EBL:  Minimal    Complications:  None    Indication/Procedure:  The patient was met in the preoperative holding area. The operative site was marked. The patient was brought back to the OR by the anesthesia team. General anesthesia was induced after a block was placed in pre-op holding     The operative extremity was prepped and draped in the standard fashion. A multidisciplinary time out was performed and the correct patient, site and procedure were confirmed.     A longitudinal incision was made over the dorsal wrist, ulnar to shawnee's tubercle. Full thickness flaps were raised radially and ulnarly. We identified the tendon ruptures at the level of the extensor retinaculum. The retinaculum was incised with a step cut. We incised the 5th compartment to expose the distal ulna. The TFCC and surrounding soft tissues were carefully raised from the distal ulna. Protecting the deep structures with homans, the ulnar head was excised with a saw. A drill hole was placed in the distal ulna and ECU was split longitudinally to create a flap. The free end of ECU was threaded through the drill hole and fixed to the remaining ECU tendon to secure the distal ulnar stump. The DRUJ capsule was closed with 2-0  Ethibond.     We then turned our attention to the tendon transfers. There was significant tenosynovitis. A  tenosynovectomy was performed. The stump of the ring finger was easily identified. The rupture was at the level of the extensor retinaculum. We cleaned up the edge of the tendon. This was transferred to EDC of long with a pulvertaft weave and reinforced with side to side sutures. We then turned our attention to the small finger. We could not identify EDC to small. EDQ had significant adhesions and pseudotendon due to the very long standing nature of the rupture. This was incised. We then identified EIP. An additional incision was made proximal to the index MCP. The tendon was cut at this level and delivered to the proximal incision. EIP was transferred to EDQ and secured with 3-0 ethibond. The small and ring fingers now had good motion with tenodesis. The wrist and MP joints were held in extension.     We then thoroughly irrigated the wounds with normal saline. The extensor retinaculum was repaired with monocryl. The incisions were closed with 4-0 nylon. We then placed him into a long arm cast with the wrist and MP joints in extension. The cast was bivalved and wrapped with an ace wrap.     The patient was awoken from anesthesia and taken to recovery in stable condition.     Post Operative Plan:  Long arm cast, bivalved.   Weight bear to transfers only, otherwise NWB   Pain control per outside pain contract  Follow up 2 weeks

## 2020-01-22 NOTE — ANESTHESIA PREPROCEDURE EVALUATION
Relevant Problems   ANESTHESIA   (+) MONICA (obstructive sleep apnea)      CARDIAC   (+) Aortic ejection murmur   (+) Aortic root dilatation (HCC)   (+) Aortic stenosis, moderate   (+) Atrial fibrillation with rapid ventricular response (HCC)   (+) Essential hypertension      GI   (+) Bleeding ulcer      Other   (+) Osteomyelitis (HCC)   (+) Rheumatoid arthritis (HCC)       Physical Exam    Airway   Mallampati: II  TM distance: >3 FB  Neck ROM: full       Cardiovascular - normal exam  Rhythm: regular  Rate: normal  (+) murmur     Dental - normal exam           Pulmonary - normal exam  Breath sounds clear to auscultation     Abdominal    Neurological - normal exam                 Anesthesia Plan    ASA 3 (MONICA/Aortic Stenosis)       Plan - general and peripheral nerve block     Peripheral nerve block will be post-op pain control  Airway plan will be LMA        Induction: intravenous    Postoperative Plan: Postoperative administration of opioids is intended.    Pertinent diagnostic labs and testing reviewed    Informed Consent:    Anesthetic plan and risks discussed with patient.    Use of blood products discussed with: patient whom consented to blood products.

## 2020-01-22 NOTE — ANESTHESIA PROCEDURE NOTES
Airway  Date/Time: 1/22/2020 7:14 AM  Performed by: Irving Cordova M.D.  Authorized by: Irving Cordova M.D.     Location:  OR  Urgency:  Elective  Difficult Airway: No    Indications for Airway Management:  Anesthesia  Spontaneous Ventilation: absent    Sedation Level:  Deep  Preoxygenated: Yes    Mask Difficulty Assessment:  0 - not attempted  Final Airway Type:  Supraglottic airway  Final Supraglottic Airway:  Standard LMA  SGA Size:  5  Number of Attempts at Approach:  1

## 2020-02-12 ENCOUNTER — OFFICE VISIT (OUTPATIENT)
Dept: PHYSICAL MEDICINE AND REHAB | Facility: MEDICAL CENTER | Age: 45
End: 2020-02-12
Payer: COMMERCIAL

## 2020-02-12 VITALS
TEMPERATURE: 97.6 F | HEIGHT: 71 IN | SYSTOLIC BLOOD PRESSURE: 100 MMHG | DIASTOLIC BLOOD PRESSURE: 68 MMHG | HEART RATE: 77 BPM | BODY MASS INDEX: 33.76 KG/M2 | OXYGEN SATURATION: 98 %

## 2020-02-12 DIAGNOSIS — M79.2 NEUROPATHIC PAIN: ICD-10-CM

## 2020-02-12 DIAGNOSIS — M86.10 OTHER ACUTE OSTEOMYELITIS, UNSPECIFIED SITE (HCC): ICD-10-CM

## 2020-02-12 DIAGNOSIS — G89.29 CHRONIC KNEE PAIN, UNSPECIFIED LATERALITY: ICD-10-CM

## 2020-02-12 DIAGNOSIS — M25.561 CHRONIC PAIN OF RIGHT KNEE: ICD-10-CM

## 2020-02-12 DIAGNOSIS — M25.569 CHRONIC KNEE PAIN, UNSPECIFIED LATERALITY: ICD-10-CM

## 2020-02-12 DIAGNOSIS — G89.29 CHRONIC PAIN OF RIGHT KNEE: ICD-10-CM

## 2020-02-12 DIAGNOSIS — S32.592A CLOSED FRACTURE OF RAMUS OF LEFT PUBIS, INITIAL ENCOUNTER (HCC): ICD-10-CM

## 2020-02-12 DIAGNOSIS — M06.9 RHEUMATOID ARTHRITIS INVOLVING MULTIPLE SITES, UNSPECIFIED RHEUMATOID FACTOR PRESENCE: ICD-10-CM

## 2020-02-12 DIAGNOSIS — M17.10 ARTHRITIS OF KNEE: ICD-10-CM

## 2020-02-12 PROCEDURE — 99215 OFFICE O/P EST HI 40 MIN: CPT | Performed by: PHYSICAL MEDICINE & REHABILITATION

## 2020-02-12 RX ORDER — NALOXONE HYDROCHLORIDE 4 MG/.1ML
SPRAY NASAL
Qty: 1 PACKAGE | Refills: 0 | Status: ON HOLD | OUTPATIENT
Start: 2020-02-12 | End: 2023-01-01

## 2020-02-12 RX ORDER — MORPHINE SULFATE 15 MG/1
15 TABLET ORAL 3 TIMES DAILY PRN
Qty: 120 TAB | Refills: 0 | Status: SHIPPED | OUTPATIENT
Start: 2020-04-14 | End: 2020-05-14

## 2020-02-12 RX ORDER — PREGABALIN 150 MG/1
150 CAPSULE ORAL 3 TIMES DAILY
Qty: 90 CAP | Refills: 0 | Status: SHIPPED | OUTPATIENT
Start: 2020-04-18 | End: 2020-05-18

## 2020-02-12 RX ORDER — PREGABALIN 150 MG/1
150 CAPSULE ORAL 3 TIMES DAILY
Qty: 90 CAP | Refills: 0 | Status: SHIPPED | OUTPATIENT
Start: 2020-03-19 | End: 2020-04-18

## 2020-02-12 RX ORDER — PREGABALIN 150 MG/1
150 CAPSULE ORAL 3 TIMES DAILY
Qty: 90 CAP | Refills: 0 | Status: SHIPPED | OUTPATIENT
Start: 2020-02-18 | End: 2020-03-19

## 2020-02-12 RX ORDER — MORPHINE SULFATE 15 MG/1
15 TABLET ORAL 3 TIMES DAILY PRN
Qty: 120 TAB | Refills: 0 | Status: SHIPPED | OUTPATIENT
Start: 2020-03-15 | End: 2020-02-27

## 2020-02-12 RX ORDER — MORPHINE SULFATE 15 MG/1
15 TABLET ORAL 3 TIMES DAILY PRN
Qty: 120 TAB | Refills: 0 | Status: SHIPPED | OUTPATIENT
Start: 2020-02-14 | End: 2020-03-15

## 2020-02-12 ASSESSMENT — PATIENT HEALTH QUESTIONNAIRE - PHQ9
SUM OF ALL RESPONSES TO PHQ QUESTIONS 1-9: 3
5. POOR APPETITE OR OVEREATING: 0 - NOT AT ALL
CLINICAL INTERPRETATION OF PHQ2 SCORE: 1

## 2020-02-12 ASSESSMENT — PAIN SCALES - GENERAL: PAINLEVEL: 7=MODERATE-SEVERE PAIN

## 2020-02-13 NOTE — PROGRESS NOTES
Follow up patient note  Interventional spine and sports physiatry, Physical medicine rehabilitation      Chief complaint:   Chief Complaint   Patient presents with   • Follow-Up     Arthritis         HISTORY    Please see new patient note by Dr Gutierrez,  for more details.     HPI  Patient identification: Richard Hubbard II 44 y.o. male  With Diagnoses of Rheumatoid arthritis involving multiple sites, unspecified rheumatoid factor presence (HCC), Chronic knee pain, unspecified laterality, Arthritis of knee, Closed fracture of ramus of left pubis, initial encounter ([facility]), Chronic pain of right knee, Other acute osteomyelitis, unspecified site ([facility]), and Neuropathic pain were pertinent to this visit.     Status post surgery for the patient's right ulna and he is awaiting a right knee replacement.  Continues to have significant pain in the knee, ankle and arm.  These were previously managed with Lyrica, Norco, morphine IR.  Still having significant pain.  Denies constipation and medication side effects.  Pain is severe in intensity and improved on his pain meds.            12/11/2019 I reviewed the notes from psychiatry which were done 12/10/2019 from Dr. Ribeiro.  Notes that the diagnosis is still unclear for bipolar disorder and wishes to speak with the patient's wife.  History of possible ADHD with stimulant medications but also a history of misusing those medications as well.  No new medications were given as there is still an unclear diagnosis at that time with plans to follow-up with the patient.    I reviewed the discharge summary from records from 11/10/2019.  Patient had A. fib RVR with a heart rate of 190 on admission, defibrillation was done, diltiazem was given, cardiology was consulted.  The patient was cardioverted into sinus rhythm.  I reviewed the procedure note from Dr. Alarcon of cardiology for MIA showing no signs of thrombus.  Cardioversion was done.  I reviewed the discharge summary from   [facility] from 11/2/2019.  The patient was seen for acute rehab for osteomyelitis and pelvic fracture.  The patient also had a chronic compression fracture of T11.     ROS Red Flags :   Fever, Chills, Sweats: Denies  Involuntary Weight Loss: Denies  Bowel/Bladder Incontinence: Denies  Saddle Anesthesia: Denies        PMHx:   Past Medical History:   Diagnosis Date   • ADD (attention deficit disorder)    • Alcohol abuse    • Allergic rhinitis 5/21/2009   • Anemia 09/2019   • Anxiety 5/21/2009   • Back pain 5/21/2009   • Bipolar disorder (HCC)    • Bleeding ulcer 5/21/2009   • Depression 5/21/2009   • Eczema 5/21/2009   • History of bleeding ulcers 2/12/2015   • Hypertension    • Insomnia 5/21/2009   • Migraine 5/21/2009   • Obesity, morbid (HCC) 5/21/2009   • Pain 09/2019    Chronic knee and back pain   • Rheumatoid arteritis ([facility]) 09/2019    knee, feet   • Sleep apnea 5/21/2009    Did not tolerate cpap, does not use it   • Snoring        PSHx:   Past Surgical History:   Procedure Laterality Date   • TENDON TRANSFER Right 1/22/2020    Procedure: RIGHT DISTAL ULNA RESECTION AND EXTENSOR TENDON TRANSFER;  Surgeon: Marine Rushing M.D.;  Location: SURGERY [facility];  Service: Orthopedics   • IRRIGATION & DEBRIDEMENT ORTHO Left 10/9/2019    Procedure: IRRIGATION AND DEBRIDEMENT, WOUND - PELVIC OSTEOMYELITIS;  Surgeon: You Olivares M.D.;  Location: SURGERY [facility];  Service: Orthopedics   • GASTRIC BYPASS LAPAROSCOPIC  2000    Lucila en y   • CHOLECYSTECTOMY  2000       Family history   Family History   Problem Relation Age of Onset   • Hypertension Mother    • Arthritis Mother    • Depression Mother    • Cancer Father         bladder   • Schizophrenia Father    • Cancer Maternal Grandmother         breast   • Heart Disease Maternal Grandmother    • Psychiatric Illness Other    • Stroke Maternal Aunt    • Other Neg Hx         no connective tissue disorders or known aortic disease  "        Medications:   Outpatient Medications Marked as Taking for the 2/12/20 encounter (Office Visit) with Gerardo Gutierrez M.D.   Medication Sig Dispense Refill   • [START ON 2/18/2020] pregabalin (LYRICA) 150 MG Cap Take 1 Cap by mouth 3 times a day for 30 days. 90 Cap 0   • [START ON 3/19/2020] pregabalin (LYRICA) 150 MG Cap Take 1 Cap by mouth 3 times a day for 30 days. 90 Cap 0   • [START ON 4/18/2020] pregabalin (LYRICA) 150 MG Cap Take 1 Cap by mouth 3 times a day for 30 days. 90 Cap 0   • [START ON 2/14/2020] morphine (MS IR) 15 MG tablet Take 1 Tab by mouth 3 times a day as needed for Severe Pain for up to 30 days. 120 Tab 0   • [START ON 3/15/2020] morphine (MS IR) 15 MG tablet Take 1 Tab by mouth 3 times a day as needed for Severe Pain for up to 30 days. 120 Tab 0   • [START ON 4/14/2020] morphine (MS IR) 15 MG tablet Take 1 Tab by mouth 3 times a day as needed for Severe Pain for up to 30 days. 120 Tab 0   • Naloxone (NARCAN) 4 MG/0.1ML Liquid One spray in one nostril for overdose and call 911. 1 Package 0   • sulfamethoxazole-trimethoprim (BACTRIM DS) 800-160 MG tablet Take 1 Tab by mouth 2 times a day.     • losartan (COZAAR) 25 MG Tab Take 1 Tab by mouth every day. 30 Tab 2   • omeprazole (PRILOSEC) 40 MG delayed-release capsule Take 1 Cap by mouth every day. 30 Cap 2        Current Outpatient Medications on File Prior to Visit   Medication Sig Dispense Refill   • sulfamethoxazole-trimethoprim (BACTRIM DS) 800-160 MG tablet Take 1 Tab by mouth 2 times a day.     • losartan (COZAAR) 25 MG Tab Take 1 Tab by mouth every day. 30 Tab 2   • omeprazole (PRILOSEC) 40 MG delayed-release capsule Take 1 Cap by mouth every day. 30 Cap 2   • furosemide (LASIX) 20 MG Tab Take 1 Tab by mouth 2 times a day. (Patient not taking: Reported on 2/12/2020) 60 Tab 1     No current facility-administered medications on file prior to visit.          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   Social Needs   • Financial resource strain: Not on file   • Food insecurity     Worry: Not on file     Inability: Not on file   • Transportation needs     Medical: Not on file     Non-medical: Not on file   Tobacco Use   • Smoking status: Former Smoker     Packs/day: 0.25     Years: 3.00     Pack years: 0.75     Types: Cigarettes, Cigars     Last attempt to quit: 2019     Years since quittin.3   • Smokeless tobacco: Never Used   • Tobacco comment: occasional cigar   Substance and Sexual Activity   • Alcohol use: No     Alcohol/week: 0.0 oz     Comment: quit 2.5 yrs ago   • Drug use: No   • Sexual activity: Yes     Partners: Female     Comment: ;    Lifestyle   • Physical activity     Days per week: Not on file     Minutes per session: Not on file   • Stress: Not on file   Relationships   • Social connections     Talks on phone: Not on file     Gets together: Not on file     Attends Anabaptist service: Not on file     Active member of club or organization: Not on file     Attends meetings of clubs or organizations: Not on file     Relationship status: Not on file   • Intimate partner violence     Fear of current or ex partner: Not on file     Emotionally abused: Not on file     Physically abused: Not on file     Forced sexual activity: Not on file   Other Topics Concern   •  Service No   • Blood Transfusions Yes   • Caffeine Concern No   • Occupational Exposure No   • Hobby Hazards No   • Sleep Concern No   • Stress " "Concern Yes   • Weight Concern Yes   • Special Diet No   • Back Care No   • Exercise No   • Bike Helmet No   • Seat Belt Yes   • Self-Exams Yes   Social History Narrative    , lives in [place]         EXAMINATION     Physical Exam:   Vitals: /68 (BP Location: Left arm, Patient Position: Sitting, BP Cuff Size: Adult)   Pulse 77   Temp 36.4 °C (97.6 °F) (Temporal)   Ht 1.803 m (5' 11\")   SpO2 98%     Constitutional:   Body Habitus: Body mass index is 33.76 kg/m².  Cooperation: Fully cooperates with exam  Appearance: Well-groomed no disheveled    Respiratory-  breathing comfortable on room air, no audible wheezing  Cardiovascular- capillary refills less than 2 seconds. No lower extremity edema is noted.   Psychiatric- alert and oriented ×3. Normal affect.    MSK: - see previous notes from Dr Gutierrez          MEDICAL DECISION MAKING    DATA    Labs:   Lab Results   Component Value Date/Time    SODIUM 135 01/22/2020 06:39 AM    POTASSIUM 4.2 01/22/2020 06:39 AM    CHLORIDE 102 01/22/2020 06:39 AM    CO2 28 01/22/2020 06:39 AM    GLUCOSE 84 01/22/2020 06:39 AM    BUN 10 01/22/2020 06:39 AM    CREATININE 0.91 01/22/2020 06:39 AM    CREATININE 0.95 10/19/2010 12:05 PM    BUNCREATRAT 9 10/19/2010 12:05 PM    GLOMRATE >59 10/19/2010 12:05 PM        Lab Results   Component Value Date/Time    PROTHROMBTM 13.6 10/02/2019 08:59 AM    INR 1.03 10/02/2019 08:59 AM        Lab Results   Component Value Date/Time    WBC 5.9 01/22/2020 06:00 AM    WBC 8.4 10/19/2010 12:05 PM    RBC 4.63 (L) 01/22/2020 06:00 AM    RBC 4.96 10/19/2010 12:05 PM    HEMOGLOBIN 13.1 (L) 01/22/2020 06:00 AM    HEMATOCRIT 42.0 01/22/2020 06:00 AM    MCV 90.7 01/22/2020 06:00 AM    MCV 85 10/19/2010 12:05 PM    MCH 28.3 01/22/2020 06:00 AM    MCH 28.4 10/19/2010 12:05 PM    MCHC 31.2 (L) 01/22/2020 06:00 AM    MPV 8.5 (L) 01/22/2020 06:00 AM    NEUTSPOLYS 41.20 (L) 11/08/2019 04:00 AM    LYMPHOCYTES 42.60 (H) 11/08/2019 04:00 AM    MONOCYTES " 10.70 11/08/2019 04:00 AM    EOSINOPHILS 3.50 11/08/2019 04:00 AM    BASOPHILS 1.20 11/08/2019 04:00 AM    HYPOCHROMIA 1+ 10/25/2019 04:00 AM    ANISOCYTOSIS 1+ 10/25/2019 04:00 AM        Lab Results   Component Value Date/Time    HBA1C 4.8 10/18/2019 05:18 AM          Imaging:   I personally reviewed following images    MRI pelvis without contrast 10/6/2019 with fracture of the left pubic ramus.    I reviewed the following radiology reports    MRI pelvis with and without contrast 11/14/2019  Interval improvement in the left parasymphyseal centered edema following subacute fracture with improving muscular and marrow signal     Small fluid collection adjacent to the superior pubic ramus likely is a hematoma. Superinfection is not suspected but cannot be excluded on the basis of imaging alone     Bilateral zone 1 sacral fractures       Results for orders placed during the hospital encounter of 06/07/11   MR-BRAIN-WITH & W/O    Impression 1.  Normal MRI scan of the brain with and without contrast enhancement.                                     Results for orders placed during the hospital encounter of 09/27/19   MR-LUMBAR SPINE-W/O    Impression 1.  Acute bilateral sacral alae fractures.  2.  There is edema in the lower posterior paraspinal soft tissue likely representing soft tissue contusion.  3.  There is no lumbar spine fractures.              Results for orders placed during the hospital encounter of 09/27/19   MR-THORACIC SPINE-W/O    Impression 1.  No acute fracture in the thoracic spine.  2.  Mild chronic compression deformity at T11.        Results for orders placed during the hospital encounter of 09/27/19   MR-LUMBAR SPINE-W/O    Impression 1.  Acute bilateral sacral alae fractures.  2.  There is edema in the lower posterior paraspinal soft tissue likely representing soft tissue contusion.  3.  There is no lumbar spine fractures.                              Results for orders placed during the hospital  encounter of 11/14/19   MR-PELVIS-WITH & W/O AND SEQUENCES    Impression Interval improvement in the left parasymphyseal centered edema following subacute fracture with improving muscular and marrow signal    Small fluid collection adjacent to the superior pubic ramus likely is a hematoma. Superinfection is not suspected but cannot be excluded on the basis of imaging alone    Bilateral zone 1 sacral fractures      Results for orders placed during the hospital encounter of 09/27/19   MR-PELVIS W/O    Impression 1.  Limited exam secondary to significant patient motion.  2.  Subacute fracture involving the left pubic bone with osteomyelitis and probable intraosseous abscess.  3.  Extensive edema and enlargement of the muscles of the medial compartment, pectineus muscle, and pubococcygeus muscle is consistent with known intramuscular abscesses. Fluid collection is most extensive in the adductor longus muscle.  4.  Subacute fractures of the sacrum          Comment: Results given to Dr. Varma through [place] text at 3:56 PM                                                                                                 Results for orders placed during the hospital encounter of 09/27/19   CT-ABDOMEN-PELVIS WITH    Impression 1.  Interval decrease in the intramuscular abscesses involving the left pelvis and thigh following drain placement. Collection in the pubococcygeus muscle is similar in size to the prior exam.    2.  Ventral hernia containing anterior bowel wall and fat. No secondary obstruction.                 Results for orders placed during the hospital encounter of 09/27/19   CT-PELVIS WITH    Impression 1.  Recent but probably subacute fracture of the left superior pubic ramus near the symphysis. Adjacent and just lateral to this are two complex masslike structures, the largest and most medial of which contains a fluid-filled cavity. These are   consistent with hematomata, possibly intramuscular.                   Results for orders placed during the hospital encounter of 08/07/08   DX-ANKLE 3+ VIEWS    Impression IMPRESSION:    NORMAL LEFT ANKLE SERIES.      TRB/sbh    Read By ASHLEY HUSAIN MD on Aug  7 2008  6:52PM  : SHARRI Transcription Date: Aug  8 2008 10:10AM  THIS DOCUMENT HAS BEEN ELECTRONICALLY SIGNED BY: ASHLEY HUSAIN MD on   Aug  8 2008  4:42PM               Results for orders placed during the hospital encounter of 05/07/09   DX-CHEST-2 VIEWS    Impression IMPRESSION:    NORMAL CHEST.     JHW/danie          Read By TABITHA SUERO MD on May  7 2009  3:54PM  : [place] Transcription Date: May  7 2009  4:28PM  THIS DOCUMENT HAS BEEN ELECTRONICALLY SIGNED BY: TABITHA SUERO MD on May    7 2009  4:37PM                     Results for orders placed in visit on 02/09/16   DX-FOOT-COMPLETE 3+ RIGHT    Impression 1.  Flatfoot deformity.    2.  Mild DJD.        Results for orders placed in visit on 02/09/16   DX-HAND 2- RIGHT    Impression Normal exam.                          Results for orders placed during the hospital encounter of 09/16/19   DX-LUMBAR SPINE-2 OR 3 VIEWS    Impression Dextroconvex lumbar curvature.  No acute osseous abnormality.  No malalignment.                            Results for orders placed during the hospital encounter of 09/16/19   DX-THORACIC SPINE-WITH SWIMMERS VIEW    Impression Mild chronic height loss of the mid thoracic spine from T9 to T11.                   DIAGNOSIS   Visit Diagnoses     ICD-10-CM   1. Rheumatoid arthritis involving multiple sites, unspecified rheumatoid factor presence ([facility]) M06.9   2. Chronic knee pain, unspecified laterality M25.569    G89.29   3. Arthritis of knee M17.10   4. Closed fracture of ramus of left pubis, initial encounter ([facility]) S32.592A   5. Chronic pain of right knee M25.561    G89.29   6. Other acute osteomyelitis, unspecified site ([facility]) M86.10   7. Neuropathic pain M79.2         ASSESSMENT and PLAN:     Richard Hubbard  II 44 y.o. male       was seen today for follow-up.    Diagnoses and all orders for this visit:    Rheumatoid arthritis involving multiple sites, unspecified rheumatoid factor presence ([facility])  -     morphine (MS IR) 15 MG tablet; Take 1 Tab by mouth 3 times a day as needed for Severe Pain for up to 30 days.  -     morphine (MS IR) 15 MG tablet; Take 1 Tab by mouth 3 times a day as needed for Severe Pain for up to 30 days.  -     morphine (MS IR) 15 MG tablet; Take 1 Tab by mouth 3 times a day as needed for Severe Pain for up to 30 days.  -     Consent for Opiate Prescription  -     Controlled Substance Treatment Agreement  -     Naloxone (NARCAN) 4 MG/0.1ML Liquid; One spray in one nostril for overdose and call 911.    Chronic knee pain, unspecified laterality  -     morphine (MS IR) 15 MG tablet; Take 1 Tab by mouth 3 times a day as needed for Severe Pain for up to 30 days.  -     morphine (MS IR) 15 MG tablet; Take 1 Tab by mouth 3 times a day as needed for Severe Pain for up to 30 days.  -     morphine (MS IR) 15 MG tablet; Take 1 Tab by mouth 3 times a day as needed for Severe Pain for up to 30 days.  -     Consent for Opiate Prescription  -     Controlled Substance Treatment Agreement  -     Naloxone (NARCAN) 4 MG/0.1ML Liquid; One spray in one nostril for overdose and call 911.    Arthritis of knee  -     morphine (MS IR) 15 MG tablet; Take 1 Tab by mouth 3 times a day as needed for Severe Pain for up to 30 days.  -     morphine (MS IR) 15 MG tablet; Take 1 Tab by mouth 3 times a day as needed for Severe Pain for up to 30 days.  -     morphine (MS IR) 15 MG tablet; Take 1 Tab by mouth 3 times a day as needed for Severe Pain for up to 30 days.  -     Consent for Opiate Prescription  -     Controlled Substance Treatment Agreement  -     Naloxone (NARCAN) 4 MG/0.1ML Liquid; One spray in one nostril for overdose and call 911.    Closed fracture of ramus of left pubis, initial encounter ([facility])  -     morphine  (MS IR) 15 MG tablet; Take 1 Tab by mouth 3 times a day as needed for Severe Pain for up to 30 days.  -     morphine (MS IR) 15 MG tablet; Take 1 Tab by mouth 3 times a day as needed for Severe Pain for up to 30 days.  -     morphine (MS IR) 15 MG tablet; Take 1 Tab by mouth 3 times a day as needed for Severe Pain for up to 30 days.  -     Consent for Opiate Prescription  -     Controlled Substance Treatment Agreement  -     Naloxone (NARCAN) 4 MG/0.1ML Liquid; One spray in one nostril for overdose and call 911.    Chronic pain of right knee  -     morphine (MS IR) 15 MG tablet; Take 1 Tab by mouth 3 times a day as needed for Severe Pain for up to 30 days.  -     morphine (MS IR) 15 MG tablet; Take 1 Tab by mouth 3 times a day as needed for Severe Pain for up to 30 days.  -     morphine (MS IR) 15 MG tablet; Take 1 Tab by mouth 3 times a day as needed for Severe Pain for up to 30 days.  -     Consent for Opiate Prescription  -     Controlled Substance Treatment Agreement  -     Naloxone (NARCAN) 4 MG/0.1ML Liquid; One spray in one nostril for overdose and call 911.    Other acute osteomyelitis, unspecified site (HCC)  -     morphine (MS IR) 15 MG tablet; Take 1 Tab by mouth 3 times a day as needed for Severe Pain for up to 30 days.  -     morphine (MS IR) 15 MG tablet; Take 1 Tab by mouth 3 times a day as needed for Severe Pain for up to 30 days.  -     morphine (MS IR) 15 MG tablet; Take 1 Tab by mouth 3 times a day as needed for Severe Pain for up to 30 days.  -     Consent for Opiate Prescription  -     Controlled Substance Treatment Agreement  -     Naloxone (NARCAN) 4 MG/0.1ML Liquid; One spray in one nostril for overdose and call 911.    Neuropathic pain  -     pregabalin (LYRICA) 150 MG Cap; Take 1 Cap by mouth 3 times a day for 30 days.  -     pregabalin (LYRICA) 150 MG Cap; Take 1 Cap by mouth 3 times a day for 30 days.  -     pregabalin (LYRICA) 150 MG Cap; Take 1 Cap by mouth 3 times a day for 30  days.      Total time: 42 minutes face-to-face time. I spent greater than 50% of the time for patient care and coordination on this date, including with the patient as per assessment and plan above.        See medications in epic for more details    Continue Lyrica as above    Transition from [place] and morphine IR to adjust morphine IR.  I refilled the patient's morphine IR 15 mg 4 times daily as needed severe pain 120 tabs per 30 days for the next 90 days.    The patient may have surgery is upcoming and during that time the immediate post operative pain management will be for the surgery team because if there are issues from the surgery the patient should be contacting them for an evaluation.  After the patient is stable after that.  I will continue to follow-up with him for controlled pain management. We also discussed a tapering strategy.     I also discussed the case with the patient's wife who was at today's visit and manages his medications.  She is a nurse practitioner.      I requested the outside records from [place] [facility].       Last opioid risk scale: 9/19/2019   Last urine drug screen: 9/19/2019, consistent  Last controlled substance agreement: 9/19/2019    reviewed: 2/12/2020   Naloxone prescribed: yes    Opioid Risk Score: 16    Interpretation of Opioid Risk Score   Score 0-3 = Low risk of abuse. Do UDS at least once per year.  Score 4-7 = Moderate risk of abuse. Do UDS 1-4 times per year.  Score 8+ = High risk of abuse. Refer to specialist.     I reviewed the     In prescribing controlled substances to this patient, I certify that I have obtained and reviewed the medical history of Richard Hubbard II. I have also made a good gabe effort to obtain applicable records from other providers who have treated the patient and records did not demonstrate any increased risk of substance abuse that would prevent me from prescribing controlled substances.     I have conducted a physical exam  and documented it. I have reviewed Mr. Hubbard’s prescription history as maintained by the [place] Prescription Monitoring Program.     I have assessed the patient’s risk for abuse, dependency, and addiction using the validated Opioid Risk Tool available at https://www.mdcalc.com/kuxbdg-qsjx-ewnn-ort-narcotic-abuse.     Given the above, I believe the benefits of controlled substance therapy outweigh the risks. The reasons for prescribing controlled substances include non-narcotic, oral analgesic alternatives have been inadequate for pain control. Accordingly, I have discussed the risk and benefits, treatment plan, and alternative therapies with the patient.               Follow up: 5/6/2020     Thank you for allowing me to participate in the care of this patient. If you have any questions please not hesitate to contact me.        Please note that this dictation was created using voice recognition software. I have made every reasonable attempt to correct obvious errors but there may be errors of grammar and content that I may have overlooked prior to finalization of this note.      Gerardo Gutierrez MD  Interventional Spine and Sports Physiatry  Physical Medicine and Rehabilitation  [facility]

## 2020-02-13 NOTE — PATIENT INSTRUCTIONS
For the patient's upcoming surgeries in the immediate postop period for the first 2 to 4 weeks after surgery the pain meds should come from the surgery team to address any potential issues from surgery.  After the patient is stable after that immediate postop.  Then I will again take over controlled substance pain management for Richard Gutierrez MD

## 2020-02-18 ENCOUNTER — TELEPHONE (OUTPATIENT)
Dept: PHYSICAL MEDICINE AND REHAB | Facility: MEDICAL CENTER | Age: 45
End: 2020-02-18

## 2020-02-18 ASSESSMENT — ENCOUNTER SYMPTOMS
NERVOUS/ANXIOUS: 0
NAUSEA: 0
FEVER: 0
WEAKNESS: 1
TINGLING: 1
SORE THROAT: 0
SHORTNESS OF BREATH: 0
ABDOMINAL PAIN: 0
CHILLS: 0
CONSTIPATION: 1
DIARRHEA: 0
MYALGIAS: 1
HEADACHES: 0
VOMITING: 0
SENSORY CHANGE: 1

## 2020-02-18 NOTE — PROGRESS NOTES
Infectious Disease Clinic    Subjective:     No chief complaint on file.    Interval History: 44-year-old male with a history of rheumatoid arthritis on Humira, obesity, chronic neck right knee plan awaiting right total knee arthroplasty and hospitalized on 9/13 for acute blood loss secondary to a upper GI bleed status post blood transfusion.  Hospitalized from 9/27-10/17/2019, admitted due to worsening left thigh pain.  Patient had sustained a fall at a store approximately 1 month PTA.  He was walking with a cane and his cane got caught on the floor mat resulting in a fall.  He did not hit his head; however he landed in a position where he sustained left groin and thigh pain.  He was initially seen at the urgent care in [place] and given a medication, no imaging was performed at that time.  Over the next several weeks, he noted worsening left thigh and leg pain, in addition to needing to rely on his cane more and more.  On presentation, he was afebrile without leukocytosis.  CT of the pelvis revealed a subacute fracture of the left superior pubic ramus near the symphysis and a complex masslike structure concerning for fluid-filled cavity consistent with a hematoma.  Due to increasing leukocytosis, repeat imaging was done, 2 additional complex areas within the left groin were found.  Blood cultures on 10/2 were negative.  CT-guided drainage of the abscesses was performed on 10/2, cultures positive MSSA.  Underwent I&D on 10/9 with Dr. Olivares, thickened periosteum and soft bone was encountered intraoperatively.  OR pathology was consistent with osteomyelitis and left pubis tissue culture was positive MSSA.  While hospitalized, he was also treated for pityriasis versicolor intertrigo.  Deceived 10 days of fluconazole, followed with miconazole cream.  It was recommended that he remain on the topical therapy for 2 to 4 weeks. Discharged on IV Ancef 2 g every 8 hours x6 weeks, estimated end date  11/19/2019.    11/15/2019: Seen by MAGNUS Ordonez.  States that his lower abdominal surgical site is well-healed without any breakdown or drainage.  He has another follow-up with Dr. Olivares in roughly 3 weeks. Was hospitalized recently due to A. fib, noting that he was cardioverted and placed on Xarelto.  He has a follow-up with a surgeon at the beginning of December regarding his right knee replacement.  Unfortunately, he is also having further issues with his right wrist/hand due to being off the Humira and starting to have contractures.  His hand doctor says that he will need surgery, plan will be for this to be done in January, before the right total knee arthroplasty.  Finish IV Ancef on 11/19/2019.  Start p.o. Bactrim DS twice daily, plan to treat for 1 to 3 months.     12/16/19:  Seen by Dr. Sanchez. Ran out on Bactrim last week.  Continue with the p.o. Bactrim.  Aim for at least 3 to 6 months.    Today, 2/19/2020:            Review of Systems   Constitutional: Negative for chills, fever and malaise/fatigue.   HENT: Negative for sore throat.    Respiratory: Negative for shortness of breath.    Cardiovascular: Positive for chest pain (Off-and-on) and leg swelling.   Gastrointestinal: Positive for constipation. Negative for abdominal pain, diarrhea, nausea and vomiting.   Genitourinary: Negative for dysuria.   Musculoskeletal: Positive for joint pain and myalgias.   Skin: Negative for itching and rash.   Neurological: Positive for tingling, sensory change and weakness. Negative for headaches.   Psychiatric/Behavioral: The patient is not nervous/anxious.        Past Medical History:   Diagnosis Date   • ADD (attention deficit disorder)    • Alcohol abuse    • Allergic rhinitis 5/21/2009   • Anemia 09/2019   • Anxiety 5/21/2009   • Back pain 5/21/2009   • Bipolar disorder (HCC)    • Bleeding ulcer 5/21/2009   • Depression 5/21/2009   • Eczema 5/21/2009   • History of bleeding ulcers 2/12/2015   •  Hypertension    • Insomnia 2009   • Migraine 2009   • Obesity, morbid ([facility]) 2009   • Pain 2019    Chronic knee and back pain   • Rheumatoid arteritis ([facility]) 2019    knee, feet   • Sleep apnea 2009    Did not tolerate cpap, does not use it   • Snoring        Family History   Problem Relation Age of Onset   • Hypertension Mother    • Arthritis Mother    • Depression Mother    • Cancer Father         bladder   • Schizophrenia Father    • Cancer Maternal Grandmother         breast   • Heart Disease Maternal Grandmother    • Psychiatric Illness Other    • Stroke Maternal Aunt    • Other Neg Hx         no connective tissue disorders or known aortic disease       Social History     Tobacco Use   • Smoking status: Former Smoker     Packs/day: 0.25     Years: 3.00     Pack years: 0.75     Types: Cigarettes, Cigars     Last attempt to quit: 2019     Years since quittin.3   • Smokeless tobacco: Never Used   • Tobacco comment: occasional cigar   Substance Use Topics   • Alcohol use: No     Alcohol/week: 0.0 oz     Comment: quit 2.5 yrs ago   • Drug use: No       Allergies: Benadryl [altaryl]; Nsaids; Phenergan [promethazine hcl]; and Penicillins    Pt's medication and problem list reviewed.     Objective:     There were no vitals taken for this visit.    Physical Exam   Constitutional: He is oriented to person, place, and time and well-developed, well-nourished, and in no distress. No distress.   Obese   HENT:   Head: Normocephalic and atraumatic.   Mouth/Throat: Oropharynx is clear and moist. No oropharyngeal exudate.   Eyes: Pupils are equal, round, and reactive to light. Conjunctivae and EOM are normal.   Neck: Normal range of motion. Neck supple. No tracheal deviation present.   Cardiovascular: Normal rate and regular rhythm.   Murmur heard.  Pulmonary/Chest: Effort normal and breath sounds normal. No respiratory distress. He has no wheezes. He has no rales.   Abdominal: Soft. Bowel sounds  are normal. He exhibits no distension. There is no abdominal tenderness.   Protuberant    Lower mid abdomen, surgical site-well-healed and approximated without any breakdown, no erythema, no active drainage, nontender to palpation.   Musculoskeletal: Normal range of motion.         General: Edema (Trace BLE) present.      Comments: LUE PICC- CDI, non tender, no erythema.    Left medial thigh-slightly tender to palpation, no induration or fluctuance noted.   Neurological: He is alert and oriented to person, place, and time. No cranial nerve deficit.   Wheelchair   Skin: Skin is warm and dry. No rash noted. He is not diaphoretic. No erythema.   Psychiatric: Mood, memory, affect and judgment normal.   Pleasant   Vitals reviewed.      Labs:  WBC   Date/Time Value Ref Range Status   01/22/2020 06:00 AM 5.9 4.8 - 10.8 K/uL Final   10/19/2010 12:05 PM 8.4 4.0 - 10.5 x10E3/uL Final     RBC   Date/Time Value Ref Range Status   01/22/2020 06:00 AM 4.63 (L) 4.70 - 6.10 M/uL Final   10/19/2010 12:05 PM 4.96 4.20 - 6.00 x10E6/uL Final     Hemoglobin   Date/Time Value Ref Range Status   01/22/2020 06:00 AM 13.1 (L) 14.0 - 18.0 g/dL Final     Hematocrit   Date/Time Value Ref Range Status   01/22/2020 06:00 AM 42.0 42.0 - 52.0 % Final     MCV   Date/Time Value Ref Range Status   01/22/2020 06:00 AM 90.7 81.4 - 97.8 fL Final   10/19/2010 12:05 PM 85 80 - 98 fL Final     MCH   Date/Time Value Ref Range Status   01/22/2020 06:00 AM 28.3 27.0 - 33.0 pg Final   10/19/2010 12:05 PM 28.4 27.0 - 34.0 pg Final     MCHC   Date/Time Value Ref Range Status   01/22/2020 06:00 AM 31.2 (L) 33.7 - 35.3 g/dL Final     MPV   Date/Time Value Ref Range Status   01/22/2020 06:00 AM 8.5 (L) 9.0 - 12.9 fL Final        Sodium   Date/Time Value Ref Range Status   01/22/2020 06:39  135 - 145 mmol/L Final     Potassium   Date/Time Value Ref Range Status   01/22/2020 06:39 AM 4.2 3.6 - 5.5 mmol/L Final     Chloride   Date/Time Value Ref Range Status    01/22/2020 06:39  96 - 112 mmol/L Final     Co2   Date/Time Value Ref Range Status   01/22/2020 06:39 AM 28 20 - 33 mmol/L Final     Glucose   Date/Time Value Ref Range Status   01/22/2020 06:39 AM 84 65 - 99 mg/dL Final     Bun   Date/Time Value Ref Range Status   01/22/2020 06:39 AM 10 8 - 22 mg/dL Final     Creatinine   Date/Time Value Ref Range Status   01/22/2020 06:39 AM 0.91 0.50 - 1.40 mg/dL Final   10/19/2010 12:05 PM 0.95 0.76 - 1.27 mg/dL Final     Bun-Creatinine Ratio   Date/Time Value Ref Range Status   10/19/2010 12:05 PM 9 8 - 27 Final     Glom Filt Rate, Est   Date/Time Value Ref Range Status   10/19/2010 12:05 PM >59 >59 mL/min/1.73 Final         Assessment and Plan:   The following treatment plan was discussed with patient at length:    No diagnosis found.    1. Other acute osteomyelitis, unspecified site (HCC)  WESTERGREN SED RATE    CRP QUANTITIVE (NON-CARDIAC)    sulfamethoxazole-trimethoprim (BACTRIM DS) 800-160 MG tablet    Comp Metabolic Panel    - Discussed dosing and side effects of medications being prescribed.  Pt instructed to call clinic with any adverse effects or to discontinue the medication and go to the ER should difficulty breathing, SOB, CP, facial swelling and/or gross rash/itching occurs.   -Monitor for s/sx of worsening transitioning from IV to PO abx: increased redness, pain, swelling, drainage, breakdown of surgical site, fevers, chills, general malaise, etc.  Notify ID or go to ER should these s/sx occur.  -ESR and CRP to be done during the next set of labs with dressing change to an inflammatory markers.  -Repeat CMP, ESR and CRP about 7 to 10 days after starting p.o. antibiotics to monitor for hepato-nephrotoxicity, hyperkalemia and trend inflammatory markers.   2. Closed fracture of ramus of left pubis, initial encounter ([facility])      Follow-up with Ortho surgeon as directed.  Antibiotics as above.   3. MSSA (methicillin susceptible Staphylococcus aureus) infection       As above   4. Abscess of left thigh      MRI showing nearly resolved, now 23 x 7 x 5 mm.  Antibiotics as above.   5. Rheumatoid arthritis involving both hands with positive rheumatoid factor (HCC)      Follow-up with rheumatologist as directed.  On Humira (currently on hold-check with rheumatologist before resuming; however, Bactrim and Humira are known to have negative interactions together.  May need to transition patient to p.o. Augmentin or doxycycline if he needs to resume Humira sooner than later.), chronically immunosuppressed on steroids.  At risk for future infections.   6. Chronic pain of right knee      FU with Ortho for future Right TKA   7. Intertrigo      Resolved   8. Atrial fibrillation with rapid ventricular response (HCC)      On Xarelto     Follow up: ***, RTC sooner if needed. FU with PCP for ongoing chronic medical conditions.     Paola Syed, [email or web address].[email or web address].       Please note that this dictation was created using voice recognition software. I have  worked with technical experts from [facility] to optimize the interface.  I have made every reasonable attempt to correct obvious errors, but there may be errors of grammar and possibly content that I did not discover before finalizing the note.

## 2020-02-18 NOTE — TELEPHONE ENCOUNTER
Spoke to Pt in regards to his RX morphine that [facility] wants to verify the days upply of qty of 120 and sig is 1 tab TID.    As per Pt everything is taking cared of.    Thank you  Arlet

## 2020-02-19 ENCOUNTER — APPOINTMENT (OUTPATIENT)
Dept: INFECTIOUS DISEASES | Facility: MEDICAL CENTER | Age: 45
End: 2020-02-19
Payer: COMMERCIAL

## 2020-02-27 ENCOUNTER — OFFICE VISIT (OUTPATIENT)
Dept: INFECTIOUS DISEASES | Facility: MEDICAL CENTER | Age: 45
End: 2020-02-27
Payer: COMMERCIAL

## 2020-02-27 VITALS
SYSTOLIC BLOOD PRESSURE: 132 MMHG | HEART RATE: 82 BPM | DIASTOLIC BLOOD PRESSURE: 80 MMHG | WEIGHT: 235 LBS | TEMPERATURE: 97.9 F | OXYGEN SATURATION: 96 % | BODY MASS INDEX: 32.9 KG/M2 | HEIGHT: 71 IN

## 2020-02-27 DIAGNOSIS — M86.10 OTHER ACUTE OSTEOMYELITIS, UNSPECIFIED SITE (HCC): ICD-10-CM

## 2020-02-27 DIAGNOSIS — M05.741 RHEUMATOID ARTHRITIS INVOLVING BOTH HANDS WITH POSITIVE RHEUMATOID FACTOR (HCC): ICD-10-CM

## 2020-02-27 DIAGNOSIS — A49.01 MSSA (METHICILLIN SUSCEPTIBLE STAPHYLOCOCCUS AUREUS) INFECTION: ICD-10-CM

## 2020-02-27 DIAGNOSIS — L02.416 ABSCESS OF LEFT THIGH: ICD-10-CM

## 2020-02-27 DIAGNOSIS — M05.742 RHEUMATOID ARTHRITIS INVOLVING BOTH HANDS WITH POSITIVE RHEUMATOID FACTOR (HCC): ICD-10-CM

## 2020-02-27 DIAGNOSIS — I48.91 ATRIAL FIBRILLATION WITH RAPID VENTRICULAR RESPONSE (HCC): ICD-10-CM

## 2020-02-27 DIAGNOSIS — G89.29 CHRONIC PAIN OF RIGHT KNEE: ICD-10-CM

## 2020-02-27 DIAGNOSIS — S32.592S CLOSED FRACTURE OF RAMUS OF LEFT PUBIS, SEQUELA: ICD-10-CM

## 2020-02-27 DIAGNOSIS — M25.561 CHRONIC PAIN OF RIGHT KNEE: ICD-10-CM

## 2020-02-27 PROCEDURE — 99213 OFFICE O/P EST LOW 20 MIN: CPT | Performed by: NURSE PRACTITIONER

## 2020-02-27 ASSESSMENT — ENCOUNTER SYMPTOMS
VOMITING: 0
NAUSEA: 0
SPUTUM PRODUCTION: 0
ABDOMINAL PAIN: 0
CHILLS: 0
SENSORY CHANGE: 0
COUGH: 1
DIARRHEA: 0
SHORTNESS OF BREATH: 0
FEVER: 0
CONSTIPATION: 0
MYALGIAS: 1
TINGLING: 0
WEAKNESS: 1
HEADACHES: 0
SORE THROAT: 0
NERVOUS/ANXIOUS: 0

## 2020-02-27 NOTE — PROGRESS NOTES
Infectious Disease Clinic    Subjective:     Chief Complaint   Patient presents with   • Follow-Up     Other acute osteomyelitis, unspecified site       Interval History: 44-year-old male with a history of rheumatoid arthritis on Humira, obesity, chronic neck right knee plan awaiting right total knee arthroplasty and hospitalized on 9/13 for acute blood loss secondary to a upper GI bleed status post blood transfusion.  Hospitalized from 9/27-10/17/2019, admitted due to worsening left thigh pain.  Patient had sustained a fall at a store approximately 1 month PTA.  He was walking with a cane and his cane got caught on the floor mat resulting in a fall.  He did not hit his head; however he landed in a position where he sustained left groin and thigh pain.  He was initially seen at the urgent care in [place] and given a medication, no imaging was performed at that time.  Over the next several weeks, he noted worsening left thigh and leg pain, in addition to needing to rely on his cane more and more.  On presentation, he was afebrile without leukocytosis.  CT of the pelvis revealed a subacute fracture of the left superior pubic ramus near the symphysis and a complex masslike structure concerning for fluid-filled cavity consistent with a hematoma.  Due to increasing leukocytosis, repeat imaging was done, 2 additional complex areas within the left groin were found.  Blood cultures on 10/2 were negative.  CT-guided drainage of the abscesses was performed on 10/2, cultures positive MSSA.  Underwent I&D on 10/9 with Dr. Olivares, thickened periosteum and soft bone was encountered intraoperatively.  OR pathology was consistent with osteomyelitis and left pubis tissue culture was positive MSSA.  While hospitalized, he was also treated for pityriasis versicolor intertrigo.  Received 10 days of fluconazole, followed with miconazole cream.  It was recommended that he remain on the topical therapy for 2 to 4 weeks. Discharged on IV  Ancef 2 g every 8 hours x6 weeks, estimated end date 11/19/2019.    11/15/2019: Seen by MAGNUS Ordonez.  States that his lower abdominal surgical site is well-healed without any breakdown or drainage.  He has another follow-up with Dr. Olivares in roughly 3 weeks. Was hospitalized recently due to A. fib, noting that he was cardioverted and placed on Xarelto.  He has a follow-up with a surgeon at the beginning of December regarding his right knee replacement.  Unfortunately, he is also having further issues with his right wrist/hand due to being off the Humira and starting to have contractures.  His hand doctor says that he will need surgery, plan will be for this to be done in January, before the right total knee arthroplasty.  Finish IV Ancef on 11/19/2019.  Start p.o. Bactrim DS twice daily, plan to treat for 1 to 3 months.     12/16/19:  Seen by Dr. Sanchez. Ran out on Bactrim last week.  Continue with the p.o. Bactrim.  Aim for at least 3 to 6 months.    Today, 2/27/2020: Has been tolerating the p.o. Bactrim without issue. Denies feeling generally ill, fevers/chills, general malaise, headache, n/v/d, abdominal pain, chest pain or shortness of breath.  Notes that left pelvic surgical site remains well-healed without any breakdown.  He denies any pain around the left pelvis or hip.  Was just started on a new RA medication, biweekly injection, believes it could be Xeljanz.  Have his first shot last week.  Had surgery to his right hand approximately 1 month ago to help fix tendons that have been damaged by RA.  Has a cast in place, says he is doing well with occasional mild to moderate pain.  Is a slight cold at this time that he got from his kid, dry nonproductive cough.  Notes that he is off the Xarelto and not on any other medications for atrial fibrillation.  Was told that the A. fib likely came from the infection.    Review of Systems   Constitutional: Negative for chills, fever and malaise/fatigue.    HENT: Positive for congestion. Negative for sore throat.    Respiratory: Positive for cough (Nonproductive). Negative for sputum production and shortness of breath.    Cardiovascular: Positive for leg swelling. Negative for chest pain.   Gastrointestinal: Negative for abdominal pain, constipation, diarrhea, nausea and vomiting.   Genitourinary: Negative for dysuria.   Musculoskeletal: Positive for joint pain and myalgias.        RA  Right hand surgery 1 month ago   Skin: Negative for itching and rash.   Neurological: Positive for weakness. Negative for tingling, sensory change and headaches.   Psychiatric/Behavioral: The patient is not nervous/anxious.        Past Medical History:   Diagnosis Date   • ADD (attention deficit disorder)    • Alcohol abuse    • Allergic rhinitis 2009   • Anemia 2019   • Anxiety 2009   • Back pain 2009   • Bipolar disorder (HCC)    • Bleeding ulcer 2009   • Depression 2009   • Eczema 2009   • History of bleeding ulcers 2015   • Hypertension    • Insomnia 2009   • Migraine 2009   • Obesity, morbid ([facility]) 2009   • Pain 2019    Chronic knee and back pain   • Rheumatoid arteritis ([facility]) 2019    knee, feet   • Sleep apnea 2009    Did not tolerate cpap, does not use it   • Snoring        Family History   Problem Relation Age of Onset   • Hypertension Mother    • Arthritis Mother    • Depression Mother    • Cancer Father         bladder   • Schizophrenia Father    • Cancer Maternal Grandmother         breast   • Heart Disease Maternal Grandmother    • Psychiatric Illness Other    • Stroke Maternal Aunt    • Other Neg Hx         no connective tissue disorders or known aortic disease       Social History     Tobacco Use   • Smoking status: Former Smoker     Packs/day: 0.25     Years: 3.00     Pack years: 0.75     Types: Cigarettes, Cigars     Last attempt to quit: 2019     Years since quittin.4   • Smokeless tobacco: Never  "Used   • Tobacco comment: occasional cigar   Substance Use Topics   • Alcohol use: No     Alcohol/week: 0.0 oz     Comment: quit 2.5 yrs ago   • Drug use: No       Allergies: Benadryl [altaryl]; Nsaids; Phenergan [promethazine hcl]; and Penicillins    Pt's medication and problem list reviewed.     Objective:     /80 (BP Location: Left arm, Patient Position: Sitting, BP Cuff Size: Adult)   Pulse 82   Temp 36.6 °C (97.9 °F) (Temporal)   Ht 1.803 m (5' 11\")   Wt 106.6 kg (235 lb)   SpO2 96%   BMI 32.78 kg/m²     Physical Exam   Constitutional: He is oriented to person, place, and time and well-developed, well-nourished, and in no distress. No distress.   Obese   HENT:   Head: Normocephalic and atraumatic.   Mouth/Throat: Oropharynx is clear and moist. No oropharyngeal exudate.   Eyes: Pupils are equal, round, and reactive to light. Conjunctivae and EOM are normal. No scleral icterus.   Neck: Normal range of motion. Neck supple. No tracheal deviation present.   Cardiovascular: Normal rate and regular rhythm.   Murmur heard.  Pulmonary/Chest: Effort normal and breath sounds normal. No respiratory distress. He has no wheezes. He has no rales. He exhibits no tenderness.   Abdominal: Soft. Bowel sounds are normal. He exhibits no distension. There is no abdominal tenderness. There is no rebound and no guarding.   Protuberant    Lower left mid abdomen, surgical site-remains well-healed and approximated without any breakdown or drainage, no erythema, nontender to palpation.   Musculoskeletal: Normal range of motion.         General: Deformity and edema (Trace BLE) present.   Lymphadenopathy:     He has no cervical adenopathy.   Neurological: He is alert and oriented to person, place, and time. No cranial nerve deficit.   Shuffling gait   Skin: Skin is warm and dry. No rash noted. He is not diaphoretic. No erythema.   Psychiatric: Mood, memory, affect and judgment normal.   Pleasant   Vitals " reviewed.      Labs:  WBC   Date/Time Value Ref Range Status   01/22/2020 06:00 AM 5.9 4.8 - 10.8 K/uL Final   10/19/2010 12:05 PM 8.4 4.0 - 10.5 x10E3/uL Final     RBC   Date/Time Value Ref Range Status   01/22/2020 06:00 AM 4.63 (L) 4.70 - 6.10 M/uL Final   10/19/2010 12:05 PM 4.96 4.20 - 6.00 x10E6/uL Final     Hemoglobin   Date/Time Value Ref Range Status   01/22/2020 06:00 AM 13.1 (L) 14.0 - 18.0 g/dL Final     Hematocrit   Date/Time Value Ref Range Status   01/22/2020 06:00 AM 42.0 42.0 - 52.0 % Final     MCV   Date/Time Value Ref Range Status   01/22/2020 06:00 AM 90.7 81.4 - 97.8 fL Final   10/19/2010 12:05 PM 85 80 - 98 fL Final     MCH   Date/Time Value Ref Range Status   01/22/2020 06:00 AM 28.3 27.0 - 33.0 pg Final   10/19/2010 12:05 PM 28.4 27.0 - 34.0 pg Final     MCHC   Date/Time Value Ref Range Status   01/22/2020 06:00 AM 31.2 (L) 33.7 - 35.3 g/dL Final     MPV   Date/Time Value Ref Range Status   01/22/2020 06:00 AM 8.5 (L) 9.0 - 12.9 fL Final        Sodium   Date/Time Value Ref Range Status   01/22/2020 06:39  135 - 145 mmol/L Final     Potassium   Date/Time Value Ref Range Status   01/22/2020 06:39 AM 4.2 3.6 - 5.5 mmol/L Final     Chloride   Date/Time Value Ref Range Status   01/22/2020 06:39  96 - 112 mmol/L Final     Co2   Date/Time Value Ref Range Status   01/22/2020 06:39 AM 28 20 - 33 mmol/L Final     Glucose   Date/Time Value Ref Range Status   01/22/2020 06:39 AM 84 65 - 99 mg/dL Final     Bun   Date/Time Value Ref Range Status   01/22/2020 06:39 AM 10 8 - 22 mg/dL Final     Creatinine   Date/Time Value Ref Range Status   01/22/2020 06:39 AM 0.91 0.50 - 1.40 mg/dL Final   10/19/2010 12:05 PM 0.95 0.76 - 1.27 mg/dL Final     Bun-Creatinine Ratio   Date/Time Value Ref Range Status   10/19/2010 12:05 PM 9 8 - 27 Final     Glom Filt Rate, Est   Date/Time Value Ref Range Status   10/19/2010 12:05 PM >59 >59 mL/min/1.73 Final       Assessment and Plan:   The following treatment plan  was discussed with patient at length:    1. Other acute osteomyelitis, unspecified site (HCC)  CBC WITH DIFFERENTIAL    Comp Metabolic Panel    Sed Rate    -Continue p.o. Bactrim DS twice daily for at least another month.  -Check CBC, CMP and ESR to monitor for leukocytosis, thrombocytopenia, hyperkalemia, hepato-nephrotoxicity and trend inflammatory markers.  ESR will likely be elevated due to RA.  -We will have patient complete labs today and again in 4 weeks prior to next appointment.  If inflammatory markers are stable, will then plan to stop p.o. Bactrim.   2. Closed fracture of ramus of left pubis, sequela      As above   3. MSSA (methicillin susceptible Staphylococcus aureus) infection  CBC WITH DIFFERENTIAL    Comp Metabolic Panel    Sed Rate    As above   4. Abscess of left thigh      Resolved   5. Rheumatoid arthritis involving both hands with positive rheumatoid factor (HCC)      Just started on new RA medication, believes it is Xeljanz.  ESR will likely be elevated due to RA.  Follow-up with rheumatology as directed.   6. Chronic pain of right knee      Follow-up with Ortho for future right total knee arthroplasty.   7. Atrial fibrillation with rapid ventricular response (HCC)      No longer on any medications, told A. fib came from infection.  Follow-up with cardiology as directed.     Follow up: 4 weeks, RTC sooner if needed. FU with PCP for ongoing chronic medical conditions.     Paola Syed, [email or web address].[email or web address].N.       Please note that this dictation was created using voice recognition software. I have  worked with technical experts from [facility] to optimize the interface.  I have made every reasonable attempt to correct obvious errors, but there may be errors of grammar and possibly content that I did not discover before finalizing the note.

## 2020-03-04 ENCOUNTER — APPOINTMENT (OUTPATIENT)
Dept: PHYSICAL MEDICINE AND REHAB | Facility: REHABILITATION | Age: 45
End: 2020-03-04
Payer: COMMERCIAL

## 2020-03-06 ENCOUNTER — OFFICE VISIT (OUTPATIENT)
Dept: MEDICAL GROUP | Facility: PHYSICIAN GROUP | Age: 45
End: 2020-03-06
Payer: COMMERCIAL

## 2020-03-06 VITALS
HEART RATE: 61 BPM | RESPIRATION RATE: 16 BRPM | OXYGEN SATURATION: 95 % | SYSTOLIC BLOOD PRESSURE: 100 MMHG | BODY MASS INDEX: 32.9 KG/M2 | TEMPERATURE: 98.6 F | HEIGHT: 71 IN | WEIGHT: 235 LBS | DIASTOLIC BLOOD PRESSURE: 64 MMHG

## 2020-03-06 DIAGNOSIS — J01.00 ACUTE NON-RECURRENT MAXILLARY SINUSITIS: ICD-10-CM

## 2020-03-06 PROCEDURE — 99214 OFFICE O/P EST MOD 30 MIN: CPT | Performed by: FAMILY MEDICINE

## 2020-03-06 RX ORDER — AMOXICILLIN AND CLAVULANATE POTASSIUM 875; 125 MG/1; MG/1
1 TABLET, FILM COATED ORAL 2 TIMES DAILY
Qty: 10 TAB | Refills: 0 | Status: SHIPPED | OUTPATIENT
Start: 2020-03-06 | End: 2020-03-11

## 2020-03-06 RX ORDER — FLUTICASONE PROPIONATE 50 MCG
2 SPRAY, SUSPENSION (ML) NASAL DAILY
Qty: 1 BOTTLE | Refills: 0 | Status: SHIPPED
Start: 2020-03-06 | End: 2020-05-20

## 2020-03-06 ASSESSMENT — FIBROSIS 4 INDEX: FIB4 SCORE: 1.19

## 2020-03-06 NOTE — PROGRESS NOTES
Chief Complaint   Patient presents with   • Congestion     sinus x week    • Headache   • Cough        HPI: Patient is a 44 y.o. male complaining of 7 days of illness including: facial pain.   Mucus is: thick.  Similarly ill exposures: yes.  Treatments tried: OTC decongestants.   He  reports that he quit smoking about 5 months ago. His smoking use included cigarettes and cigars. He has a 0.75 pack-year smoking history. He has never used smokeless tobacco..     ROS:  No fever, cough, nausea, changes in bowel movements or skin rash.      I reviewed the patient's medications, allergies and medical history:  Current Outpatient Medications   Medication Sig Dispense Refill   • amoxicillin-clavulanate (AUGMENTIN) 875-125 MG Tab Take 1 Tab by mouth 2 times a day for 5 days. 10 Tab 0   • fluticasone (FLONASE) 50 MCG/ACT nasal spray Spray 2 Sprays in nose every day. 1 Bottle 0   • pregabalin (LYRICA) 150 MG Cap Take 1 Cap by mouth 3 times a day for 30 days. 90 Cap 0   • [START ON 3/19/2020] pregabalin (LYRICA) 150 MG Cap Take 1 Cap by mouth 3 times a day for 30 days. 90 Cap 0   • [START ON 4/18/2020] pregabalin (LYRICA) 150 MG Cap Take 1 Cap by mouth 3 times a day for 30 days. 90 Cap 0   • morphine (MS IR) 15 MG tablet Take 1 Tab by mouth 3 times a day as needed for Severe Pain for up to 30 days. 120 Tab 0   • [START ON 4/14/2020] morphine (MS IR) 15 MG tablet Take 1 Tab by mouth 3 times a day as needed for Severe Pain for up to 30 days. 120 Tab 0   • Naloxone (NARCAN) 4 MG/0.1ML Liquid One spray in one nostril for overdose and call 911. 1 Package 0   • sulfamethoxazole-trimethoprim (BACTRIM DS) 800-160 MG tablet Take 1 Tab by mouth 2 times a day.     • losartan (COZAAR) 25 MG Tab Take 1 Tab by mouth every day. 30 Tab 2   • omeprazole (PRILOSEC) 40 MG delayed-release capsule Take 1 Cap by mouth every day. 30 Cap 2   • furosemide (LASIX) 20 MG Tab Take 1 Tab by mouth 2 times a day. 60 Tab 1     No current  "facility-administered medications for this visit.      Benadryl [altaryl]; Nsaids; Phenergan [promethazine hcl]; and Penicillins  Past Medical History:   Diagnosis Date   • ADD (attention deficit disorder)    • Alcohol abuse    • Allergic rhinitis 5/21/2009   • Anemia 09/2019   • Anxiety 5/21/2009   • Back pain 5/21/2009   • Bipolar disorder (HCC)    • Bleeding ulcer 5/21/2009   • Depression 5/21/2009   • Eczema 5/21/2009   • History of bleeding ulcers 2/12/2015   • Hypertension    • Insomnia 5/21/2009   • Migraine 5/21/2009   • Obesity, morbid (HCC) 5/21/2009   • Pain 09/2019    Chronic knee and back pain   • Rheumatoid arteritis ([facility]) 09/2019    knee, feet   • Sleep apnea 5/21/2009    Did not tolerate cpap, does not use it   • Snoring         EXAM:  /64   Pulse 61   Temp 37 °C (98.6 °F) (Temporal)   Resp 16   Ht 1.803 m (5' 11\")   Wt 106.6 kg (235 lb)   SpO2 95%   General: Alert, no conversational dyspnea or audible wheeze, non-toxic appearance.  Eyes: PERRL, conjunctiva slightly injected, no eye discharge.  Ears: Normal pinnae,TM's normal bilaterally.  Nares: Patent with thin mucus.  Sinuses: tender over maxillary / frontal sinuses.  Throat: Erythematous injection without exudate.   Neck: Supple, with no adenopathy.  Lungs: Clear to auscultation bilaterally, no wheeze, crackles or rhonchi.   Heart: Regular rate without murmur.  Skin: Warm and dry without rash.     ASSESSMENT:   1. Acute non-recurrent maxillary sinusitis     rx for augmentin provided. Advised on risk of cdiff as he is on bactrim also currently.      PLAN:  1. Educated patient that majority of upper respiratory infections are viral and do not need antibiotics. As symptoms have been worsening over the last week, will treat with antibiotics.  2. Twice daily use of nasal saline rinse or Neti-Pot.  3. OTC anti-pyretics and decongestants as needed.  4. Follow-up in office or urgent care for worsening symptoms, difficulty breathing, lack of " expected recovery, or should new symptoms or problems arise.

## 2020-03-06 NOTE — PATIENT INSTRUCTIONS
Sinusitis, Adult  Sinusitis is soreness and inflammation of your sinuses. Sinuses are hollow spaces in the bones around your face. Your sinuses are located:  · Around your eyes.  · In the middle of your forehead.  · Behind your nose.  · In your cheekbones.  Your sinuses and nasal passages are lined with a stringy fluid (mucus). Mucus normally drains out of your sinuses. When your nasal tissues become inflamed or swollen, the mucus can become trapped or blocked so air cannot flow through your sinuses. This allows bacteria, viruses, and funguses to grow, which leads to infection.  Sinusitis can develop quickly and last for 7?10 days (acute) or for more than 12 weeks (chronic). Sinusitis often develops after a cold.  What are the causes?  This condition is caused by anything that creates swelling in the sinuses or stops mucus from draining, including:  · Allergies.  · Asthma.  · Bacterial or viral infection.  · Abnormally shaped bones between the nasal passages.  · Nasal growths that contain mucus (nasal polyps).  · Narrow sinus openings.  · Pollutants, such as chemicals or irritants in the air.  · A foreign object stuck in the nose.  · A fungal infection. This is rare.  What increases the risk?  The following factors may make you more likely to develop this condition:  · Having allergies or asthma.  · Having had a recent cold or respiratory tract infection.  · Having structural deformities or blockages in your nose or sinuses.  · Having a weak immune system.  · Doing a lot of swimming or diving.  · Overusing nasal sprays.  · Smoking.  What are the signs or symptoms?  The main symptoms of this condition are pain and a feeling of pressure around the affected sinuses. Other symptoms include:  · Upper toothache.  · Earache.  · Headache.  · Bad breath.  · Decreased sense of smell and taste.  · A cough that may get worse at night.  · Fatigue.  · Fever.  · Thick drainage from your nose. The drainage is often green and it  may contain pus (purulent).  · Stuffy nose or congestion.  · Postnasal drip. This is when extra mucus collects in the throat or back of the nose.  · Swelling and warmth over the affected sinuses.  · Sore throat.  · Sensitivity to light.  How is this diagnosed?  This condition is diagnosed based on symptoms, a medical history, and a physical exam. To find out if your condition is acute or chronic, your health care provider may:  · Look in your nose for signs of nasal polyps.  · Tap over the affected sinus to check for signs of infection.  · View the inside of your sinuses using an imaging device that has a light attached (endoscope).  If your health care provider suspects that you have chronic sinusitis, you may also:  · Be tested for allergies.  · Have a sample of mucus taken from your nose (nasal culture) and checked for bacteria.  · Have a mucus sample examined to see if your sinusitis is related to an allergy.  If your sinusitis does not respond to treatment and it lasts longer than 8 weeks, you may have an MRI or CT scan to check your sinuses. These scans also help to determine how severe your infection is.  In rare cases, a bone biopsy may be done to rule out more serious types of fungal sinus disease.  How is this treated?  Treatment for sinusitis depends on the cause and whether your condition is chronic or acute. If a virus is causing your sinusitis, your symptoms will go away on their own within 10 days. You may be given medicines to relieve your symptoms, including:  · Topical nasal decongestants. They shrink swollen nasal passages and let mucus drain from your sinuses.  · Antihistamines. These drugs block inflammation that is triggered by allergies. This can help to ease swelling in your nose and sinuses.  · Topical nasal corticosteroids. These are nasal sprays that ease inflammation and swelling in your nose and sinuses.  · Nasal saline washes. These rinses can help to get rid of thick mucus in your  nose.  If your condition is caused by bacteria, you will be given an antibiotic medicine. If your condition is caused by a fungus, you will be given an antifungal medicine.  Surgery may be needed to correct underlying conditions, such as narrow nasal passages. Surgery may also be needed to remove polyps.  Follow these instructions at home:  Medicines  · Take, use, or apply over-the-counter and prescription medicines only as told by your health care provider. These may include nasal sprays.  · If you were prescribed an antibiotic medicine, take it as told by your health care provider. Do not stop taking the antibiotic even if you start to feel better.  Hydrate and Humidify  · Drink enough water to keep your urine clear or pale yellow. Staying hydrated will help to thin your mucus.  · Use a cool mist humidifier to keep the humidity level in your home above 50%.  · Inhale steam for 10-15 minutes, 3-4 times a day or as told by your health care provider. You can do this in the bathroom while a hot shower is running.  · Limit your exposure to cool or dry air.  Rest  · Rest as much as possible.  · Sleep with your head raised (elevated).  · Make sure to get enough sleep each night.  General instructions  · Apply a warm, moist washcloth to your face 3-4 times a day or as told by your health care provider. This will help with discomfort.  · Wash your hands often with soap and water to reduce your exposure to viruses and other germs. If soap and water are not available, use hand .  · Do not smoke. Avoid being around people who are smoking (secondhand smoke).  · Keep all follow-up visits as told by your health care provider. This is important.  Contact a health care provider if:  · You have a fever.  · Your symptoms get worse.  · Your symptoms do not improve within 10 days.  Get help right away if:  · You have a severe headache.  · You have persistent vomiting.  · You have pain or swelling around your face or  eyes.  · You have vision problems.  · You develop confusion.  · Your neck is stiff.  · You have trouble breathing.  This information is not intended to replace advice given to you by your health care provider. Make sure you discuss any questions you have with your health care provider.  Document Released: 12/18/2006 Document Revised: 08/13/2017 Document Reviewed: 10/12/2016  [employer] Education © 2017 [employer]

## 2020-03-26 ENCOUNTER — APPOINTMENT (OUTPATIENT)
Dept: INFECTIOUS DISEASES | Facility: MEDICAL CENTER | Age: 45
End: 2020-03-26
Payer: COMMERCIAL

## 2020-05-06 ENCOUNTER — TELEMEDICINE (OUTPATIENT)
Dept: PHYSICAL MEDICINE AND REHAB | Facility: MEDICAL CENTER | Age: 45
End: 2020-05-06
Payer: COMMERCIAL

## 2020-05-06 VITALS — HEIGHT: 70 IN | WEIGHT: 220 LBS | BODY MASS INDEX: 31.5 KG/M2

## 2020-05-06 DIAGNOSIS — M79.2 NEUROPATHIC PAIN: ICD-10-CM

## 2020-05-06 DIAGNOSIS — M25.569 CHRONIC KNEE PAIN, UNSPECIFIED LATERALITY: ICD-10-CM

## 2020-05-06 DIAGNOSIS — G89.29 CHRONIC KNEE PAIN, UNSPECIFIED LATERALITY: ICD-10-CM

## 2020-05-06 DIAGNOSIS — G89.29 CHRONIC PAIN OF RIGHT KNEE: ICD-10-CM

## 2020-05-06 DIAGNOSIS — S32.592A CLOSED FRACTURE OF RAMUS OF LEFT PUBIS, INITIAL ENCOUNTER (HCC): ICD-10-CM

## 2020-05-06 DIAGNOSIS — M06.9 RHEUMATOID ARTHRITIS INVOLVING MULTIPLE SITES, UNSPECIFIED RHEUMATOID FACTOR PRESENCE: ICD-10-CM

## 2020-05-06 DIAGNOSIS — M17.10 ARTHRITIS OF KNEE: ICD-10-CM

## 2020-05-06 DIAGNOSIS — M86.10 OTHER ACUTE OSTEOMYELITIS, UNSPECIFIED SITE (HCC): ICD-10-CM

## 2020-05-06 DIAGNOSIS — M25.561 CHRONIC PAIN OF RIGHT KNEE: ICD-10-CM

## 2020-05-06 PROCEDURE — 99214 OFFICE O/P EST MOD 30 MIN: CPT | Mod: 95,CR | Performed by: PHYSICAL MEDICINE & REHABILITATION

## 2020-05-06 ASSESSMENT — PATIENT HEALTH QUESTIONNAIRE - PHQ9: CLINICAL INTERPRETATION OF PHQ2 SCORE: 0

## 2020-05-06 ASSESSMENT — FIBROSIS 4 INDEX: FIB4 SCORE: 1.19

## 2020-05-06 ASSESSMENT — PAIN SCALES - GENERAL: PAINLEVEL: 6=MODERATE PAIN

## 2020-05-06 NOTE — PROGRESS NOTES
Follow up patient note  Interventional spine and sports physiatry, Physical medicine rehabilitation      Chief complaint:   Chief Complaint   Patient presents with   • Follow-Up     Knee Pain         HISTORY    Please see new patient note by Dr Gutierrez,  for more details.     HPI  Patient identification: Richard Hubbard II 44 y.o. male  With Diagnoses of Rheumatoid arthritis involving multiple sites, unspecified rheumatoid factor presence (HCC), Chronic knee pain, unspecified laterality, Arthritis of knee, Closed fracture of ramus of left pubis, initial encounter ([facility]), Chronic pain of right knee, Other acute osteomyelitis, unspecified site (HCC), and Neuropathic pain were pertinent to this visit.       This encounter was conducted via Zoom .   Audio and video were used with the platform above  Verbal consent was obtained. Patient's identity was verified.  This was done during the covid 19 pandemic     The patient continues to have pain in his right arm, pelvis and right knee.  He is awaiting a right knee replacement which is been postponed because of Covid 19.  He continues to have severe pain in the knee, aching, nonradiating.  He states that he had a slip and fall while in a store.  He states that this worsened his pain but he did not have any pain similar to his previous fractures..  This is improved with his Lyrica and morphine IR.  He denies constipation or side effects of the pain medications.  He is using these appropriately.          12/11/2019 I reviewed the notes from psychiatry which were done 12/10/2019 from Dr. Ribeiro.  Notes that the diagnosis is still unclear for bipolar disorder and wishes to speak with the patient's wife.  History of possible ADHD with stimulant medications but also a history of misusing those medications as well.  No new medications were given as there is still an unclear diagnosis at that time with plans to follow-up with the patient.    I reviewed the discharge summary from  records from 11/10/2019.  Patient had A. fib RVR with a heart rate of 190 on admission, defibrillation was done, diltiazem was given, cardiology was consulted.  The patient was cardioverted into sinus rhythm.  I reviewed the procedure note from Dr. Alarcon of cardiology for MIA showing no signs of thrombus.  Cardioversion was done.  I reviewed the discharge summary from Dr. Lalo Pisano from 11/2/2019.  The patient was seen for acute rehab for osteomyelitis and pelvic fracture.  The patient also had a chronic compression fracture of T11.     ROS Red Flags :   Fever, Chills, Sweats: Denies  Involuntary Weight Loss: Denies  Bowel/Bladder Incontinence: Denies  Saddle Anesthesia: Denies        PMHx:   Past Medical History:   Diagnosis Date   • ADD (attention deficit disorder)    • Alcohol abuse    • Allergic rhinitis 5/21/2009   • Anemia 09/2019   • Anxiety 5/21/2009   • Back pain 5/21/2009   • Bipolar disorder (HCC)    • Bleeding ulcer 5/21/2009   • Depression 5/21/2009   • Eczema 5/21/2009   • History of bleeding ulcers 2/12/2015   • Hypertension    • Insomnia 5/21/2009   • Migraine 5/21/2009   • Obesity, morbid (HCC) 5/21/2009   • Pain 09/2019    Chronic knee and back pain   • Rheumatoid arteritis (HCC) 09/2019    knee, feet   • Sleep apnea 5/21/2009    Did not tolerate cpap, does not use it   • Snoring        PSHx:   Past Surgical History:   Procedure Laterality Date   • TENDON TRANSFER Right 1/22/2020    Procedure: RIGHT DISTAL ULNA RESECTION AND EXTENSOR TENDON TRANSFER;  Surgeon: Marine Rushing M.D.;  Location: SURGERY [facility];  Service: Orthopedics   • IRRIGATION & DEBRIDEMENT ORTHO Left 10/9/2019    Procedure: IRRIGATION AND DEBRIDEMENT, WOUND - PELVIC OSTEOMYELITIS;  Surgeon: You Olivares M.D.;  Location: SURGERY [facility];  Service: Orthopedics   • GASTRIC BYPASS LAPAROSCOPIC  2000    Lucila en y   • CHOLECYSTECTOMY  2000       Family history   Family History   Problem  Relation Age of Onset   • Hypertension Mother    • Arthritis Mother    • Depression Mother    • Cancer Father         bladder   • Schizophrenia Father    • Cancer Maternal Grandmother         breast   • Heart Disease Maternal Grandmother    • Psychiatric Illness Other    • Stroke Maternal Aunt    • Other Neg Hx         no connective tissue disorders or known aortic disease         Medications:   Outpatient Medications Marked as Taking for the 5/6/20 encounter (Telemedicine) with Gerardo Gutierrez M.D.   Medication Sig Dispense Refill   • [START ON 5/14/2020] morphine (MS IR) 15 MG tablet Take 1 Tab by mouth 3 times a day as needed for Severe Pain for up to 30 days. 120 Tab 0   • [START ON 6/13/2020] morphine (MS IR) 15 MG tablet Take 1 Tab by mouth 3 times a day as needed for Severe Pain for up to 30 days. 120 Tab 0   • [START ON 7/13/2020] morphine (MS IR) 15 MG tablet Take 1 Tab by mouth 3 times a day as needed for Severe Pain for up to 30 days. 120 Tab 0   • [START ON 5/13/2020] pregabalin (LYRICA) 150 MG Cap Take 1 Cap by mouth 3 times a day for 30 days. 90 Cap 0   • [START ON 6/12/2020] pregabalin (LYRICA) 150 MG Cap Take 1 Cap by mouth 3 times a day for 30 days. 90 Cap 0   • [START ON 7/12/2020] pregabalin (LYRICA) 150 MG Cap Take 1 Cap by mouth 3 times a day for 30 days. 90 Cap 0   • pregabalin (LYRICA) 150 MG Cap Take 1 Cap by mouth 3 times a day for 30 days. 90 Cap 0   • morphine (MS IR) 15 MG tablet Take 1 Tab by mouth 3 times a day as needed for Severe Pain for up to 30 days. 120 Tab 0   • Naloxone (NARCAN) 4 MG/0.1ML Liquid One spray in one nostril for overdose and call 911. 1 Package 0   • losartan (COZAAR) 25 MG Tab Take 1 Tab by mouth every day. 30 Tab 2   • omeprazole (PRILOSEC) 40 MG delayed-release capsule Take 1 Cap by mouth every day. 30 Cap 2        Current Outpatient Medications on File Prior to Visit   Medication Sig Dispense Refill   • pregabalin (LYRICA) 150 MG Cap Take 1 Cap by mouth 3 times  "a day for 30 days. 90 Cap 0   • morphine (MS IR) 15 MG tablet Take 1 Tab by mouth 3 times a day as needed for Severe Pain for up to 30 days. 120 Tab 0   • Naloxone (NARCAN) 4 MG/0.1ML Liquid One spray in one nostril for overdose and call 911. 1 Package 0   • losartan (COZAAR) 25 MG Tab Take 1 Tab by mouth every day. 30 Tab 2   • omeprazole (PRILOSEC) 40 MG delayed-release capsule Take 1 Cap by mouth every day. 30 Cap 2   • fluticasone (FLONASE) 50 MCG/ACT nasal spray Spray 2 Sprays in nose every day. 1 Bottle 0   • sulfamethoxazole-trimethoprim (BACTRIM DS) 800-160 MG tablet Take 1 Tab by mouth 2 times a day.       No current facility-administered medications on file prior to visit.          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   Social Needs   • Financial resource strain: Not on file   • Food insecurity     Worry: Not on file     Inability: Not on file   • Transportation needs     Medical: Not on file     Non-medical: Not on file   Tobacco Use   • Smoking status: Former Smoker     Packs/day: 0.25     Years: 3.00     Pack years: 0.75     Types: Cigarettes, Cigars     Last attempt to quit: 2019     Years since quittin.6   • Smokeless tobacco: Never Used   • Tobacco comment: occasional cigar   Substance and Sexual Activity   • Alcohol use: No     Alcohol/week: 0.0 oz     Comment: quit 2.5 yrs ago   • Drug use: No   • Sexual activity: Yes     Partners: Female     Comment: ;    Lifestyle   • " "Physical activity     Days per week: Not on file     Minutes per session: Not on file   • Stress: Not on file   Relationships   • Social connections     Talks on phone: Not on file     Gets together: Not on file     Attends [employer] service: Not on file     Active member of club or organization: Not on file     Attends meetings of clubs or organizations: Not on file     Relationship status: Not on file   • Intimate partner violence     Fear of current or ex partner: Not on file     Emotionally abused: Not on file     Physically abused: Not on file     Forced sexual activity: Not on file   Other Topics Concern   •  Service No   • Blood Transfusions Yes   • Caffeine Concern No   • Occupational Exposure No   • Hobby Hazards No   • Sleep Concern No   • Stress Concern Yes   • Weight Concern Yes   • Special Diet No   • Back Care No   • Exercise No   • Bike Helmet No   • Seat Belt Yes   • Self-Exams Yes   Social History Narrative    , lives in [place]         EXAMINATION     Physical Exam:   Vitals: Ht 1.778 m (5' 10\")   Wt 99.8 kg (220 lb)     Constitutional:   Body Habitus: Body mass index is 31.57 kg/m².  Cooperation: Fully cooperates with exam  Appearance: Well-groomed no disheveled    Respiratory-  breathing comfortable on room air, no audible wheezing  Cardiovascular-  No lower extremity edema is noted.   Psychiatric- alert and oriented ×3. Normal affect.    MSK: - see previous notes from Dr Gutierrez          MEDICAL DECISION MAKING    DATA    Labs:   Lab Results   Component Value Date/Time    SODIUM 135 01/22/2020 06:39 AM    POTASSIUM 4.2 01/22/2020 06:39 AM    CHLORIDE 102 01/22/2020 06:39 AM    CO2 28 01/22/2020 06:39 AM    GLUCOSE 84 01/22/2020 06:39 AM    BUN 10 01/22/2020 06:39 AM    CREATININE 0.91 01/22/2020 06:39 AM    CREATININE 0.95 10/19/2010 12:05 PM    BUNCREATRAT 9 10/19/2010 12:05 PM    GLOMRATE >59 10/19/2010 12:05 PM        Lab Results   Component Value Date/Time    PROTHROMBTM 13.6 " 10/02/2019 08:59 AM    INR 1.03 10/02/2019 08:59 AM        Lab Results   Component Value Date/Time    WBC 5.9 01/22/2020 06:00 AM    WBC 8.4 10/19/2010 12:05 PM    RBC 4.63 (L) 01/22/2020 06:00 AM    RBC 4.96 10/19/2010 12:05 PM    HEMOGLOBIN 13.1 (L) 01/22/2020 06:00 AM    HEMATOCRIT 42.0 01/22/2020 06:00 AM    MCV 90.7 01/22/2020 06:00 AM    MCV 85 10/19/2010 12:05 PM    MCH 28.3 01/22/2020 06:00 AM    MCH 28.4 10/19/2010 12:05 PM    MCHC 31.2 (L) 01/22/2020 06:00 AM    MPV 8.5 (L) 01/22/2020 06:00 AM    NEUTSPOLYS 41.20 (L) 11/08/2019 04:00 AM    LYMPHOCYTES 42.60 (H) 11/08/2019 04:00 AM    MONOCYTES 10.70 11/08/2019 04:00 AM    EOSINOPHILS 3.50 11/08/2019 04:00 AM    BASOPHILS 1.20 11/08/2019 04:00 AM    HYPOCHROMIA 1+ 10/25/2019 04:00 AM    ANISOCYTOSIS 1+ 10/25/2019 04:00 AM        Lab Results   Component Value Date/Time    HBA1C 4.8 10/18/2019 05:18 AM          Imaging:   I personally reviewed following images    MRI pelvis without contrast 10/6/2019 with fracture of the left pubic ramus.    I reviewed the following radiology reports    MRI pelvis with and without contrast 11/14/2019  Interval improvement in the left parasymphyseal centered edema following subacute fracture with improving muscular and marrow signal     Small fluid collection adjacent to the superior pubic ramus likely is a hematoma. Superinfection is not suspected but cannot be excluded on the basis of imaging alone     Bilateral zone 1 sacral fractures       Results for orders placed during the hospital encounter of 06/07/11   MR-BRAIN-WITH & W/O    Impression 1.  Normal MRI scan of the brain with and without contrast enhancement.                                     Results for orders placed during the hospital encounter of 09/27/19   MR-LUMBAR SPINE-W/O    Impression 1.  Acute bilateral sacral alae fractures.  2.  There is edema in the lower posterior paraspinal soft tissue likely representing soft tissue contusion.  3.  There is no lumbar  spine fractures.              Results for orders placed during the hospital encounter of 09/27/19   MR-THORACIC SPINE-W/O    Impression 1.  No acute fracture in the thoracic spine.  2.  Mild chronic compression deformity at T11.        Results for orders placed during the hospital encounter of 09/27/19   MR-LUMBAR SPINE-W/O    Impression 1.  Acute bilateral sacral alae fractures.  2.  There is edema in the lower posterior paraspinal soft tissue likely representing soft tissue contusion.  3.  There is no lumbar spine fractures.                              Results for orders placed during the hospital encounter of 11/14/19   MR-PELVIS-WITH & W/O AND SEQUENCES    Impression Interval improvement in the left parasymphyseal centered edema following subacute fracture with improving muscular and marrow signal    Small fluid collection adjacent to the superior pubic ramus likely is a hematoma. Superinfection is not suspected but cannot be excluded on the basis of imaging alone    Bilateral zone 1 sacral fractures      Results for orders placed during the hospital encounter of 09/27/19   MR-PELVIS W/O    Impression 1.  Limited exam secondary to significant patient motion.  2.  Subacute fracture involving the left pubic bone with osteomyelitis and probable intraosseous abscess.  3.  Extensive edema and enlargement of the muscles of the medial compartment, pectineus muscle, and pubococcygeus muscle is consistent with known intramuscular abscesses. Fluid collection is most extensive in the adductor longus muscle.  4.  Subacute fractures of the sacrum          Comment: Results given to Dr. Varma through [place] text at 3:56 PM                                                                                                 Results for orders placed during the hospital encounter of 09/27/19   CT-ABDOMEN-PELVIS WITH    Impression 1.  Interval decrease in the intramuscular abscesses involving the left pelvis and thigh following drain  placement. Collection in the pubococcygeus muscle is similar in size to the prior exam.    2.  Ventral hernia containing anterior bowel wall and fat. No secondary obstruction.                 Results for orders placed during the hospital encounter of 09/27/19   CT-PELVIS WITH    Impression 1.  Recent but probably subacute fracture of the left superior pubic ramus near the symphysis. Adjacent and just lateral to this are two complex masslike structures, the largest and most medial of which contains a fluid-filled cavity. These are   consistent with hematomata, possibly intramuscular.                  Results for orders placed during the hospital encounter of 08/07/08   DX-ANKLE 3+ VIEWS    Impression IMPRESSION:    NORMAL LEFT ANKLE SERIES.      TRB/sb    Read By ASHLEY HUSAIN MD on Aug  7 2008  6:52PM  : [facility] Transcription Date: Aug  8 2008 10:10AM  THIS DOCUMENT HAS BEEN ELECTRONICALLY SIGNED BY: ASHLEY HUSAIN MD on   Aug  8 2008  4:42PM               Results for orders placed during the hospital encounter of 05/07/09   DX-CHEST-2 VIEWS    Impression IMPRESSION:    NORMAL CHEST.     JHW/danie          Read By TABITHA SUERO MD on May  7 2009  3:54PM  : [facility] Transcription Date: May  7 2009  4:28PM  THIS DOCUMENT HAS BEEN ELECTRONICALLY SIGNED BY: TABITHA SUERO MD on May    7 2009  4:37PM                     Results for orders placed in visit on 02/09/16   DX-FOOT-COMPLETE 3+ RIGHT    Impression 1.  Flatfoot deformity.    2.  Mild DJD.        Results for orders placed in visit on 02/09/16   DX-HAND 2- RIGHT    Impression Normal exam.                          Results for orders placed during the hospital encounter of 09/16/19   DX-LUMBAR SPINE-2 OR 3 VIEWS    Impression Dextroconvex lumbar curvature.  No acute osseous abnormality.  No malalignment.                            Results for orders placed during the hospital encounter of 09/16/19   DX-THORACIC SPINE-WITH SWIMMERS VIEW     Impression Mild chronic height loss of the mid thoracic spine from T9 to T11.                   DIAGNOSIS   Visit Diagnoses     ICD-10-CM   1. Rheumatoid arthritis involving multiple sites, unspecified rheumatoid factor presence ([facility]) M06.9   2. Chronic knee pain, unspecified laterality M25.569    G89.29   3. Arthritis of knee M17.10   4. Closed fracture of ramus of left pubis, initial encounter ([facility]) S32.592A   5. Chronic pain of right knee M25.561    G89.29   6. Other acute osteomyelitis, unspecified site ([facility]) M86.10   7. Neuropathic pain M79.2         ASSESSMENT and PLAN:     Richard Hubbard II 44 y.o. male       was seen today for follow-up.    Diagnoses and all orders for this visit:    Rheumatoid arthritis involving multiple sites, unspecified rheumatoid factor presence ([facility])  -     morphine (MS IR) 15 MG tablet; Take 1 Tab by mouth 3 times a day as needed for Severe Pain for up to 30 days.  -     morphine (MS IR) 15 MG tablet; Take 1 Tab by mouth 3 times a day as needed for Severe Pain for up to 30 days.  -     morphine (MS IR) 15 MG tablet; Take 1 Tab by mouth 3 times a day as needed for Severe Pain for up to 30 days.    Chronic knee pain, unspecified laterality  -     morphine (MS IR) 15 MG tablet; Take 1 Tab by mouth 3 times a day as needed for Severe Pain for up to 30 days.  -     morphine (MS IR) 15 MG tablet; Take 1 Tab by mouth 3 times a day as needed for Severe Pain for up to 30 days.  -     morphine (MS IR) 15 MG tablet; Take 1 Tab by mouth 3 times a day as needed for Severe Pain for up to 30 days.    Arthritis of knee  -     morphine (MS IR) 15 MG tablet; Take 1 Tab by mouth 3 times a day as needed for Severe Pain for up to 30 days.  -     morphine (MS IR) 15 MG tablet; Take 1 Tab by mouth 3 times a day as needed for Severe Pain for up to 30 days.  -     morphine (MS IR) 15 MG tablet; Take 1 Tab by mouth 3 times a day as needed for Severe Pain for up to 30 days.    Closed fracture of  ramus of left pubis, initial encounter (HCC)  -     morphine (MS IR) 15 MG tablet; Take 1 Tab by mouth 3 times a day as needed for Severe Pain for up to 30 days.  -     morphine (MS IR) 15 MG tablet; Take 1 Tab by mouth 3 times a day as needed for Severe Pain for up to 30 days.  -     morphine (MS IR) 15 MG tablet; Take 1 Tab by mouth 3 times a day as needed for Severe Pain for up to 30 days.    Chronic pain of right knee  -     morphine (MS IR) 15 MG tablet; Take 1 Tab by mouth 3 times a day as needed for Severe Pain for up to 30 days.  -     morphine (MS IR) 15 MG tablet; Take 1 Tab by mouth 3 times a day as needed for Severe Pain for up to 30 days.  -     morphine (MS IR) 15 MG tablet; Take 1 Tab by mouth 3 times a day as needed for Severe Pain for up to 30 days.    Other acute osteomyelitis, unspecified site (HCC)  -     morphine (MS IR) 15 MG tablet; Take 1 Tab by mouth 3 times a day as needed for Severe Pain for up to 30 days.  -     morphine (MS IR) 15 MG tablet; Take 1 Tab by mouth 3 times a day as needed for Severe Pain for up to 30 days.  -     morphine (MS IR) 15 MG tablet; Take 1 Tab by mouth 3 times a day as needed for Severe Pain for up to 30 days.    Neuropathic pain  -     pregabalin (LYRICA) 150 MG Cap; Take 1 Cap by mouth 3 times a day for 30 days.  -     pregabalin (LYRICA) 150 MG Cap; Take 1 Cap by mouth 3 times a day for 30 days.  -     pregabalin (LYRICA) 150 MG Cap; Take 1 Cap by mouth 3 times a day for 30 days.          See medications in epic for more details    Continue Lyrica as above    I refilled the patient's morphine IR 15 mg 4 times daily as needed severe pain 120 tabs per 30 days for the next 90 days.    The patient may have surgery is upcoming and during that time the immediate post operative pain management will be for the surgery team because if there are issues from the surgery the patient should be contacting them for an evaluation.  After the patient is stable after that.  I  will continue to follow-up with him for controlled pain management. We also discussed a tapering strategy.             Last opioid risk scale: 9/19/2019   Last urine drug screen: 9/19/2019, consistent  Last controlled substance agreement: 9/19/2019    reviewed: 5/7/2020   Naloxone prescribed: yes    Opioid Risk Score: 16    Interpretation of Opioid Risk Score   Score 0-3 = Low risk of abuse. Do UDS at least once per year.  Score 4-7 = Moderate risk of abuse. Do UDS 1-4 times per year.  Score 8+ = High risk of abuse. Refer to specialist.     I reviewed the     In prescribing controlled substances to this patient, I certify that I have obtained and reviewed the medical history of Richard Hubbard II. I have also made a good gabe effort to obtain applicable records from other providers who have treated the patient and records did not demonstrate any increased risk of substance abuse that would prevent me from prescribing controlled substances.     I have conducted a physical exam and documented it. I have reviewed Mr. Hubbard’s prescription history as maintained by the [place] Prescription Monitoring Program.     I have assessed the patient’s risk for abuse, dependency, and addiction using the validated Opioid Risk Tool available at https://www.mdcalc.com/vrwuva-yxhu-ekrj-ort-narcotic-abuse.     Given the above, I believe the benefits of controlled substance therapy outweigh the risks. The reasons for prescribing controlled substances include non-narcotic, oral analgesic alternatives have been inadequate for pain control. Accordingly, I have discussed the risk and benefits, treatment plan, and alternative therapies with the patient.               Follow up: 3 months    Thank you for allowing me to participate in the care of this patient. If you have any questions please not hesitate to contact me.        Please note that this dictation was created using voice recognition software. I have made every  reasonable attempt to correct obvious errors but there may be errors of grammar and content that I may have overlooked prior to finalization of this note.      Gerardo Gutierrez MD  Interventional Spine and Sports Physiatry  Physical Medicine and Rehabilitation  [facility]

## 2020-05-07 RX ORDER — MORPHINE SULFATE 15 MG/1
15 TABLET ORAL 3 TIMES DAILY PRN
Qty: 120 TAB | Refills: 0 | Status: SHIPPED
Start: 2020-07-13 | End: 2020-05-20

## 2020-05-07 RX ORDER — PREGABALIN 150 MG/1
150 CAPSULE ORAL 3 TIMES DAILY
Qty: 90 CAP | Refills: 0 | Status: SHIPPED | OUTPATIENT
Start: 2020-05-13 | End: 2020-06-12

## 2020-05-07 RX ORDER — PREGABALIN 150 MG/1
150 CAPSULE ORAL 3 TIMES DAILY
Qty: 90 CAP | Refills: 0 | Status: SHIPPED
Start: 2020-06-12 | End: 2020-05-20

## 2020-05-07 RX ORDER — PREGABALIN 150 MG/1
150 CAPSULE ORAL 3 TIMES DAILY
Qty: 90 CAP | Refills: 0 | Status: SHIPPED
Start: 2020-07-12 | End: 2020-05-20

## 2020-05-07 RX ORDER — MORPHINE SULFATE 15 MG/1
15 TABLET ORAL 3 TIMES DAILY PRN
Qty: 120 TAB | Refills: 0 | Status: SHIPPED | OUTPATIENT
Start: 2020-05-14 | End: 2020-06-13

## 2020-05-07 RX ORDER — MORPHINE SULFATE 15 MG/1
15 TABLET ORAL 3 TIMES DAILY PRN
Qty: 120 TAB | Refills: 0 | Status: SHIPPED
Start: 2020-06-13 | End: 2020-05-20

## 2020-05-20 ENCOUNTER — TELEMEDICINE (OUTPATIENT)
Dept: CARDIOLOGY | Facility: MEDICAL CENTER | Age: 45
End: 2020-05-20
Payer: COMMERCIAL

## 2020-05-20 VITALS — WEIGHT: 230 LBS | BODY MASS INDEX: 33 KG/M2

## 2020-05-20 DIAGNOSIS — I48.91 ATRIAL FIBRILLATION WITH RAPID VENTRICULAR RESPONSE (HCC): ICD-10-CM

## 2020-05-20 PROCEDURE — 99214 OFFICE O/P EST MOD 30 MIN: CPT | Mod: 95,CR | Performed by: INTERNAL MEDICINE

## 2020-05-20 ASSESSMENT — FIBROSIS 4 INDEX: FIB4 SCORE: 1.19

## 2020-05-20 NOTE — PROGRESS NOTES
"Telemedicine Visit: Established Patient     This encounter was conducted via Zoom .   Verbal consent was obtained. Patient's identity was verified.    Subjective:   CC: Afib, AS  Richard Hubbard II is a 44 y.o. male presenting for evaluation and management of:    Afib, AS    Last seen for management of pAF and moderate AS with BAV. Since last seen, no recurrence of Afib known, no palpitations, no SOB, feeling well. LE swelling has improved, now off lasix.    ROS   Denies any recent fevers or chills. No nausea or vomiting. No chest pains or shortness of breath.     Allergies   Allergen Reactions   • Benadryl [Altaryl]      \"I get agitated\"   • Nsaids      Bleeding, \"I had a bleeding ulcer\"   • Phenergan [Promethazine Hcl]      \"I get very agitated\"   • Penicillins      \"Had some dizziness\"  Tolerated Unasyn 10/2019       Current medicines (including changes today)  Current Outpatient Medications   Medication Sig Dispense Refill   • morphine (MS IR) 15 MG tablet Take 1 Tab by mouth 3 times a day as needed for Severe Pain for up to 30 days. 120 Tab 0   • pregabalin (LYRICA) 150 MG Cap Take 1 Cap by mouth 3 times a day for 30 days. 90 Cap 0   • Naloxone (NARCAN) 4 MG/0.1ML Liquid One spray in one nostril for overdose and call 911. 1 Package 0   • losartan (COZAAR) 25 MG Tab Take 1 Tab by mouth every day. 30 Tab 2   • omeprazole (PRILOSEC) 40 MG delayed-release capsule Take 1 Cap by mouth every day. 30 Cap 2   • [START ON 6/13/2020] morphine (MS IR) 15 MG tablet Take 1 Tab by mouth 3 times a day as needed for Severe Pain for up to 30 days. 120 Tab 0   • [START ON 7/13/2020] morphine (MS IR) 15 MG tablet Take 1 Tab by mouth 3 times a day as needed for Severe Pain for up to 30 days. 120 Tab 0   • [START ON 6/12/2020] pregabalin (LYRICA) 150 MG Cap Take 1 Cap by mouth 3 times a day for 30 days. 90 Cap 0   • [START ON 7/12/2020] pregabalin (LYRICA) 150 MG Cap Take 1 Cap by mouth 3 times a day for 30 days. 90 Cap 0   • " fluticasone (FLONASE) 50 MCG/ACT nasal spray Spray 2 Sprays in nose every day. 1 Bottle 0   • sulfamethoxazole-trimethoprim (BACTRIM DS) 800-160 MG tablet Take 1 Tab by mouth 2 times a day.       No current facility-administered medications for this visit.        Patient Active Problem List    Diagnosis Date Noted   • Atrial fibrillation with rapid ventricular response ([facility]) 11/05/2019     Priority: High   • Pubic ramus fracture ([facility]) 09/28/2019     Priority: Low   • Essential hypertension 09/13/2019     Priority: Low   • Rheumatoid arthritis ([facility]) 09/08/2015     Priority: Low   • Chronic pain of right knee 09/08/2015     Priority: Low   • Osteomyelitis ([facility]) 10/07/2019   • Positive occult stool blood test 09/17/2019   • Aortic root dilatation ([facility]) 09/13/2019   • Elevated alkaline phosphatase level 09/13/2019   • Aortic stenosis, moderate 09/12/2019   • Iron deficiency anemia, unspecified iron deficiency anemia type 08/15/2019   • Localized edema 08/15/2019   • Aortic ejection murmur 03/08/2019   • Obesity (BMI 35.0-39.9 without comorbidity) ([facility]) 08/09/2018   • Pagophagia 11/13/2017   • Adult ADHD 09/08/2010   • MONICA (obstructive sleep apnea) 03/09/2010   • Bleeding ulcer 05/21/2009   • Mood disorder ([facility]) 05/21/2009   • Anxiety disorder 05/21/2009   • Eczema 05/21/2009       Family History   Problem Relation Age of Onset   • Hypertension Mother    • Arthritis Mother    • Depression Mother    • Cancer Father         bladder   • Schizophrenia Father    • Cancer Maternal Grandmother         breast   • Heart Disease Maternal Grandmother    • Psychiatric Illness Other    • Stroke Maternal Aunt    • Other Neg Hx         no connective tissue disorders or known aortic disease       He  has a past medical history of ADD (attention deficit disorder), Alcohol abuse, Allergic rhinitis (5/21/2009), Anemia (09/2019), Anxiety (5/21/2009), Back pain (5/21/2009), Bipolar disorder ([facility]), Bleeding ulcer (5/21/2009), Depression  (5/21/2009), Eczema (5/21/2009), History of bleeding ulcers (2/12/2015), Hypertension, Insomnia (5/21/2009), Migraine (5/21/2009), Obesity, morbid (HCC) (5/21/2009), Pain (09/2019), Rheumatoid arteritis (HCC) (09/2019), Sleep apnea (5/21/2009), and Snoring. He also has no past medical history of Hyperlipidemia.  He  has a past surgical history that includes gastric bypass laparoscopic (2000); cholecystectomy (2000); irrigation & debridement ortho (Left, 10/9/2019); and tendon transfer (Right, 1/22/2020).       Objective:   Wt 104.3 kg (230 lb)   BMI 33.00 kg/m²    Wt 104.3 kg (230 lb)   BMI 33.00 kg/m²     Physical Exam:  Constitutional: Alert, no distress, well-groomed.  Skin: No rashes in visible areas.  Eye: Round. Conjunctiva clear, lids normal. No icterus.   ENMT: Lips pink without lesions, good dentition, moist mucous membranes. Phonation normal.  Neck: No masses, no thyromegaly. Moves freely without pain.  CV: Pulse as reported by patient  Respiratory: Unlabored respiratory effort, no cough or audible wheeze  Psych: Alert and oriented x3, normal affect and mood.       Assessment and Plan:   The following treatment plan was discussed:     1. pAF  2. Moderate AS  3. LE edema    - [id number], no OAC, no recurrence of PAF, sole episode in setting of acute osteomyelitis  - Bicuspid AV with mod AS, annual echos for followup, next echo in 6 months  - LE edema improved, no easily identifiable cause, appears non cardiac    Follow-up: F/u 6 months

## 2020-06-08 ENCOUNTER — OFFICE VISIT (OUTPATIENT)
Dept: URGENT CARE | Facility: PHYSICIAN GROUP | Age: 45
End: 2020-06-08
Payer: COMMERCIAL

## 2020-06-08 VITALS
OXYGEN SATURATION: 94 % | RESPIRATION RATE: 16 BRPM | HEART RATE: 76 BPM | DIASTOLIC BLOOD PRESSURE: 84 MMHG | HEIGHT: 71 IN | SYSTOLIC BLOOD PRESSURE: 118 MMHG | BODY MASS INDEX: 32.08 KG/M2 | TEMPERATURE: 97.9 F

## 2020-06-08 DIAGNOSIS — S61.215A LACERATION OF LEFT RING FINGER WITHOUT FOREIGN BODY WITHOUT DAMAGE TO NAIL, INITIAL ENCOUNTER: ICD-10-CM

## 2020-06-08 PROCEDURE — 90471 IMMUNIZATION ADMIN: CPT | Performed by: PHYSICIAN ASSISTANT

## 2020-06-08 PROCEDURE — 90715 TDAP VACCINE 7 YRS/> IM: CPT | Performed by: PHYSICIAN ASSISTANT

## 2020-06-08 PROCEDURE — 12001 RPR S/N/AX/GEN/TRNK 2.5CM/<: CPT | Mod: 25 | Performed by: PHYSICIAN ASSISTANT

## 2020-06-08 RX ORDER — CEPHALEXIN 500 MG/1
500 CAPSULE ORAL 4 TIMES DAILY
Qty: 20 CAP | Refills: 0 | Status: SHIPPED | OUTPATIENT
Start: 2020-06-08 | End: 2020-06-13

## 2020-06-08 NOTE — PROGRESS NOTES
Chief Complaint   Patient presents with   • Laceration     pt states happened while trying to open a bag of dog food with a knife.        HISTORY OF PRESENT ILLNESS: Patient is a 44 y.o. male who presents today for the following:    Patient comes in for evaluation of a laceration on his left fourth finger that he sustained around 2200 hrs. last night.  He tried to open a bag of dog food when he excellently cut his finger with a knife.  He has full range of motion and denies distal paresthesias.  He is unsure of his last tetanus shot.    Patient Active Problem List    Diagnosis Date Noted   • Atrial fibrillation with rapid ventricular response ([facility]) 11/05/2019     Priority: High   • Pubic ramus fracture ([facility]) 09/28/2019     Priority: Low   • Essential hypertension 09/13/2019     Priority: Low   • Rheumatoid arthritis ([facility]) 09/08/2015     Priority: Low   • Chronic pain of right knee 09/08/2015     Priority: Low   • Osteomyelitis ([facility]) 10/07/2019   • Positive occult stool blood test 09/17/2019   • Aortic root dilatation ([facility]) 09/13/2019   • Elevated alkaline phosphatase level 09/13/2019   • Aortic stenosis, moderate 09/12/2019   • Iron deficiency anemia, unspecified iron deficiency anemia type 08/15/2019   • Localized edema 08/15/2019   • Aortic ejection murmur 03/08/2019   • Obesity (BMI 35.0-39.9 without comorbidity) ([facility]) 08/09/2018   • Pagophagia 11/13/2017   • Adult ADHD 09/08/2010   • MONICA (obstructive sleep apnea) 03/09/2010   • Bleeding ulcer 05/21/2009   • Mood disorder ([facility]) 05/21/2009   • Anxiety disorder 05/21/2009   • Eczema 05/21/2009       Allergies:Benadryl [altaryl]; Nsaids; Phenergan [promethazine hcl]; and Penicillins    Current Outpatient Medications Ordered in Epic   Medication Sig Dispense Refill   • cephALEXin (KEFLEX) 500 MG Cap Take 1 Cap by mouth 4 times a day for 5 days. 20 Cap 0   • morphine (MS IR) 15 MG tablet Take 1 Tab by mouth 3 times a day as needed for Severe Pain for up to 30 days.  120 Tab 0   • pregabalin (LYRICA) 150 MG Cap Take 1 Cap by mouth 3 times a day for 30 days. 90 Cap 0   • Naloxone (NARCAN) 4 MG/0.1ML Liquid One spray in one nostril for overdose and call 911. 1 Package 0   • losartan (COZAAR) 25 MG Tab Take 1 Tab by mouth every day. 30 Tab 2   • omeprazole (PRILOSEC) 40 MG delayed-release capsule Take 1 Cap by mouth every day. 30 Cap 2     No current Epic-ordered facility-administered medications on file.        Past Medical History:   Diagnosis Date   • ADD (attention deficit disorder)    • Alcohol abuse    • Allergic rhinitis 2009   • Anemia 2019   • Anxiety 2009   • Back pain 2009   • Bipolar disorder (HCC)    • Bleeding ulcer 2009   • Depression 2009   • Eczema 2009   • History of bleeding ulcers 2015   • Hypertension    • Insomnia 2009   • Migraine 2009   • Obesity, morbid ([facility]) 2009   • Pain 2019    Chronic knee and back pain   • Rheumatoid arteritis ([facility]) 2019    knee, feet   • Sleep apnea 2009    Did not tolerate cpap, does not use it   • Snoring        Social History     Tobacco Use   • Smoking status: Former Smoker     Packs/day: 0.25     Years: 3.00     Pack years: 0.75     Types: Cigarettes, Cigars     Last attempt to quit: 2019     Years since quittin.6   • Smokeless tobacco: Never Used   • Tobacco comment: occasional cigar   Substance Use Topics   • Alcohol use: No     Alcohol/week: 0.0 oz     Comment: quit 2.5 yrs ago   • Drug use: No       Family Status   Relation Name Status   • Mo  Alive   • Fa     • MGMo  (Not Specified)   • OTHER  (Not Specified)   • MAunt  (Not Specified)   • Neg Hx  (Not Specified)     Family History   Problem Relation Age of Onset   • Hypertension Mother    • Arthritis Mother    • Depression Mother    • Cancer Father         bladder   • Schizophrenia Father    • Cancer Maternal Grandmother         breast   • Heart Disease Maternal Grandmother    • Psychiatric  "Illness Other    • Stroke Maternal Aunt    • Other Neg Hx         no connective tissue disorders or known aortic disease       Review of Systems:   Constitutional ROS: No unexpected change in weight, No weakness, No fatigue  Musculoskeletal/Extremities ROS: Laceration left ring finger    Exam:  /84   Pulse 76   Temp 36.6 °C (97.9 °F) (Temporal)   Resp 16   Ht 1.803 m (5' 11\")   SpO2 94%   General: Well developed, well nourished. No distress.  Pulmonary: Unlabored respiratory effort.   Neurologic: Grossly nonfocal. No facial asymmetry noted.  Skin: 1.2 cm linear laceration on the dorsum of the left ring finger at the DIP.  Edges are well approximated.  Full extension and flexion of the left ring finger.  Psych: Normal mood. Alert and oriented x3. Judgment and insight is normal.    LACERATION REPAIR PROCEDURE NOTE  The patient's identification was confirmed and consent was obtained.  This procedure was performed by Nani Resendiz PA-C at 930 hours  Site: Left fourth finger  Sterile procedures observed  Anesthetic used (type and amt): 2% lidocaine without epinephrine, 5 mL, digital block  Irrigated with copious amounts of saline.  Wound probed for foreign body.  No foreign body noted.  Suture type/size: 4-0 Ethilon  Length: None 1.2 cm  # of Sutures/staples: #3 interrupted    Complexity: Simple     Tetanus: Updated in clinic    Assessment/Plan:  Discussed wound care at home.  Patient is almost 12 hours out from time of injury.  Given the location feel healing would be more optimal sutures.  Covering with antibiotics.  Return to clinic in 10 days for suture removal, sooner for any signs of infection as discussed in clinic.  1. Laceration of left ring finger without foreign body without damage to nail, initial encounter  Tdap =>8yo IM    cephALEXin (KEFLEX) 500 MG Cap       "

## 2020-07-15 ENCOUNTER — APPOINTMENT (OUTPATIENT)
Dept: ADMISSIONS | Facility: MEDICAL CENTER | Age: 45
DRG: 470 | End: 2020-07-15
Payer: COMMERCIAL

## 2020-07-16 ENCOUNTER — HOSPITAL ENCOUNTER (OUTPATIENT)
Dept: LAB | Facility: MEDICAL CENTER | Age: 45
End: 2020-07-16
Attending: ORTHOPAEDIC SURGERY
Payer: COMMERCIAL

## 2020-07-16 DIAGNOSIS — Z01.810 PRE-OPERATIVE CARDIOVASCULAR EXAMINATION: ICD-10-CM

## 2020-07-16 DIAGNOSIS — Z01.812 PRE-OPERATIVE LABORATORY EXAMINATION: ICD-10-CM

## 2020-07-16 LAB
ANION GAP SERPL CALC-SCNC: 8 MMOL/L (ref 7–16)
BUN SERPL-MCNC: 12 MG/DL (ref 8–22)
CALCIUM SERPL-MCNC: 8.4 MG/DL (ref 8.4–10.2)
CHLORIDE SERPL-SCNC: 102 MMOL/L (ref 96–112)
CO2 SERPL-SCNC: 25 MMOL/L (ref 20–33)
CREAT SERPL-MCNC: 0.85 MG/DL (ref 0.5–1.4)
CRP SERPL HS-MCNC: 1.13 MG/DL (ref 0–0.75)
EKG IMPRESSION: NORMAL
ERYTHROCYTE [DISTWIDTH] IN BLOOD BY AUTOMATED COUNT: 42.2 FL (ref 35.9–50)
GLUCOSE SERPL-MCNC: 86 MG/DL (ref 65–99)
HCT VFR BLD AUTO: 35.7 % (ref 42–52)
HGB BLD-MCNC: 11.5 G/DL (ref 14–18)
MCH RBC QN AUTO: 28.5 PG (ref 27–33)
MCHC RBC AUTO-ENTMCNC: 32.2 G/DL (ref 33.7–35.3)
MCV RBC AUTO: 88.4 FL (ref 81.4–97.8)
PLATELET # BLD AUTO: 231 K/UL (ref 164–446)
PMV BLD AUTO: 9 FL (ref 9–12.9)
POTASSIUM SERPL-SCNC: 4.4 MMOL/L (ref 3.6–5.5)
RBC # BLD AUTO: 4.04 M/UL (ref 4.7–6.1)
SODIUM SERPL-SCNC: 135 MMOL/L (ref 135–145)
WBC # BLD AUTO: 5.3 K/UL (ref 4.8–10.8)

## 2020-07-16 PROCEDURE — 85027 COMPLETE CBC AUTOMATED: CPT

## 2020-07-16 PROCEDURE — 85652 RBC SED RATE AUTOMATED: CPT

## 2020-07-16 PROCEDURE — 80048 BASIC METABOLIC PNL TOTAL CA: CPT

## 2020-07-16 PROCEDURE — 87641 MR-STAPH DNA AMP PROBE: CPT

## 2020-07-16 PROCEDURE — 93010 ELECTROCARDIOGRAM REPORT: CPT | Performed by: INTERNAL MEDICINE

## 2020-07-16 PROCEDURE — 87640 STAPH A DNA AMP PROBE: CPT

## 2020-07-16 PROCEDURE — 86140 C-REACTIVE PROTEIN: CPT

## 2020-07-16 PROCEDURE — 36415 COLL VENOUS BLD VENIPUNCTURE: CPT

## 2020-07-16 PROCEDURE — 93005 ELECTROCARDIOGRAM TRACING: CPT

## 2020-07-16 RX ORDER — ACETAMINOPHEN 500 MG
500-1000 TABLET ORAL EVERY 6 HOURS PRN
COMMUNITY
End: 2023-01-01

## 2020-07-16 RX ORDER — PREGABALIN 150 MG/1
150 CAPSULE ORAL 3 TIMES DAILY
Status: ON HOLD | COMMUNITY
Start: 2020-07-13 | End: 2020-08-07 | Stop reason: SDUPTHER

## 2020-07-16 RX ORDER — MORPHINE SULFATE 15 MG/1
15 TABLET ORAL EVERY 6 HOURS PRN
Status: ON HOLD | COMMUNITY
Start: 2020-07-13 | End: 2020-08-07 | Stop reason: SDUPTHER

## 2020-07-16 ASSESSMENT — FIBROSIS 4 INDEX: FIB4 SCORE: 1.21

## 2020-07-16 NOTE — DISCHARGE PLANNING
DISCHARGE PLANNING NOTE - TOTAL JOINT     Procedure: Procedure(s):  ARTHROPLASTY, KNEE, TOTAL  Procedure Date: 7/22/2020  Insurance:  Payor: [employer] / Plan: [facility] 8 / Product Type: *No Product type* /   Equipment currently available at home? cane, front-wheel walker, raised toilet seat and shower chair  Steps into the home? 2  Steps within the home? 0  Toilet height? Standard  Type of shower? walk-in shower  Who will be with you during your recovery? Spouse.  Is Outpatient Physical Therapy set up after surgery? No   Did you take the Total Joint Class and where? Yes, at On-line link and NAON book for knees given to pt.   Planning on same day discharge? No.     This writer met with pt during his preadmission appointment. Pt states he has all needed equipment. Home safety checklist reviewed and copy with pt. All questions answered and pt verbalizes understanding. Anticipate dc to home without barriers.

## 2020-07-16 NOTE — OR NURSING
"Hx and meds reviewed, pre op instructions given. Pt aware may take the listed meds morning of surgery;omeprazole, lyrica and morphine and tylenol if needed and to use narcan if needed . Anesthesia fasting guidelines reviewed with pt.Placed on MONICA protocol, does not have a device. Will notify Dr Gonzalez of cardiac and MONICA hx.      TOTAL JOINT REPLACEMENT SURGERY   PreAdmit Appointment  Pre-Operative Education Note       1) Did you take a Total Joint Replacement Pre-Operative Education class?  Where?  Answer: No. [facility] after COVID    2) If you did not take a class, did you receive pre-op education in the form of a book or through an online class?    Answer: Unsure    3) Have you had the same joint replacement procedure within the last 3 years?   Answer: No    For patients who answered \"No\" to the above questions:     4) Was patient given information on [facility] Pre-Op Education class through the Pre-Op Education Class flyer or the Alternative Pre-op Education flyer?   Answer: Yes    5) Was a [facility] Total Joint Replacement Patient Guide binder handed out?   Answer: Yes    6) Did the patient refuse preoperative education?  Answer:  No      Additional Question:  Is patient planning for a same-day discharge? No  "

## 2020-07-16 NOTE — OR NURSING
Dr Gonzalez's response regarding health hx-  This patient will need a PCP clearance given history of moderate aortic stenosis in the setting of having HTN, untreated MONICA, and an aortic root dilation which may affect him physiologically perioperatively.  Thank you.   Bonny Gonzalez M.D.  [facility]  Dr Pires's office notified via fax and call.

## 2020-07-17 LAB
ERYTHROCYTE [SEDIMENTATION RATE] IN BLOOD BY WESTERGREN METHOD: 5 MM/HOUR (ref 0–15)
SCCMEC + MECA PNL NOSE NAA+PROBE: POSITIVE
SCCMEC + MECA PNL NOSE NAA+PROBE: POSITIVE

## 2020-07-18 ENCOUNTER — OFFICE VISIT (OUTPATIENT)
Dept: ADMISSIONS | Facility: MEDICAL CENTER | Age: 45
DRG: 470 | End: 2020-07-18
Attending: ORTHOPAEDIC SURGERY
Payer: COMMERCIAL

## 2020-07-18 DIAGNOSIS — Z01.812 PRE-OPERATIVE LABORATORY EXAMINATION: ICD-10-CM

## 2020-07-18 LAB
COVID ORDER STATUS COVID19: NORMAL
SARS-COV-2 RNA RESP QL NAA+PROBE: NOTDETECTED
SPECIMEN SOURCE: NORMAL

## 2020-07-20 NOTE — OR NURSING
Called and faxed labs and EKG , + for MRSA and Staph, no PCP in [facility] as of 7/20/2020

## 2020-07-21 ENCOUNTER — OFFICE VISIT (OUTPATIENT)
Dept: MEDICAL GROUP | Facility: MEDICAL CENTER | Age: 45
End: 2020-07-21
Payer: COMMERCIAL

## 2020-07-21 VITALS
HEIGHT: 71 IN | DIASTOLIC BLOOD PRESSURE: 82 MMHG | SYSTOLIC BLOOD PRESSURE: 130 MMHG | OXYGEN SATURATION: 96 % | TEMPERATURE: 98 F | BODY MASS INDEX: 33.85 KG/M2 | WEIGHT: 241.8 LBS | HEART RATE: 92 BPM

## 2020-07-21 DIAGNOSIS — M25.561 CHRONIC PAIN OF RIGHT KNEE: Chronic | ICD-10-CM

## 2020-07-21 DIAGNOSIS — M05.741 RHEUMATOID ARTHRITIS INVOLVING BOTH HANDS WITH POSITIVE RHEUMATOID FACTOR (HCC): Chronic | ICD-10-CM

## 2020-07-21 DIAGNOSIS — F17.210 CIGARETTE NICOTINE DEPENDENCE WITHOUT COMPLICATION: ICD-10-CM

## 2020-07-21 DIAGNOSIS — Z01.818 PRE-OP EVALUATION: ICD-10-CM

## 2020-07-21 DIAGNOSIS — Z86.79 HISTORY OF ATRIAL FIBRILLATION: ICD-10-CM

## 2020-07-21 DIAGNOSIS — E66.9 OBESITY (BMI 35.0-39.9 WITHOUT COMORBIDITY): ICD-10-CM

## 2020-07-21 DIAGNOSIS — D50.8 OTHER IRON DEFICIENCY ANEMIA: ICD-10-CM

## 2020-07-21 DIAGNOSIS — I35.0 AORTIC STENOSIS, MODERATE: ICD-10-CM

## 2020-07-21 DIAGNOSIS — M05.742 RHEUMATOID ARTHRITIS INVOLVING BOTH HANDS WITH POSITIVE RHEUMATOID FACTOR (HCC): Chronic | ICD-10-CM

## 2020-07-21 DIAGNOSIS — G89.29 CHRONIC PAIN OF RIGHT KNEE: Chronic | ICD-10-CM

## 2020-07-21 DIAGNOSIS — G47.33 OSA (OBSTRUCTIVE SLEEP APNEA): Chronic | ICD-10-CM

## 2020-07-21 PROBLEM — S32.599A PUBIC RAMUS FRACTURE (HCC): Status: RESOLVED | Noted: 2019-09-28 | Resolved: 2020-07-21

## 2020-07-21 PROBLEM — D50.9 IRON DEFICIENCY ANEMIA: Chronic | Status: ACTIVE | Noted: 2019-08-15

## 2020-07-21 PROBLEM — M86.9 OSTEOMYELITIS (HCC): Status: RESOLVED | Noted: 2019-10-07 | Resolved: 2020-07-21

## 2020-07-21 PROBLEM — I77.810 AORTIC ROOT DILATATION (HCC): Chronic | Status: ACTIVE | Noted: 2019-09-13

## 2020-07-21 PROCEDURE — 99214 OFFICE O/P EST MOD 30 MIN: CPT | Performed by: NURSE PRACTITIONER

## 2020-07-21 ASSESSMENT — FIBROSIS 4 INDEX: FIB4 SCORE: 1.52380952380952381

## 2020-07-21 NOTE — PROGRESS NOTES
cc:  Pre-op evaluation for total Knee      Subjective:     HPI:     Richard Hubbard II is a 45 y.o. male here to discuss the evaluation and management of:    Patient presents today for preoperative evaluation for: Right Total Knee Arthroplasty, Dr. Pires  Surgery is tentatively planned for 7/22/2020.  States he was unaware that he needed any preop evaluation.    Any chest pain or breathlessness when climbing up 2 flights of stairs at normal speed? no  Do you have kidney disease? no  Has anyone in your family (blood relatives) had any problems following an anesthetic? Mother had issues-not sure  Have you ever had a heart attack? no  Have you ever been diagnosed with an irregular heartbeat?  Yes,   Have you ever had a stroke? no  If you have been put to sleep for an operation where there are any anesthetic problems? no  Do suffer from epilepsy or seizures? no  Do you have any problems with pain, stiffness or arthritis in your neck or jaw? no  Do have a thyroid disease? no  Do you suffer from angina? no  Do you have a liver disease? no   Have you ever been diagnosed with heart failure? no  You suffer from asthma? no  Do you have diabetes that requires insulin? n/a  Do you have diabetes that requires tablets only? n/o  Do suffer from bronchitis? No    Digestive Health: last OV- 2019  ID: last OV 2/2020-reached out to ID Dr. Goldman there is no reservations of upcoming surgery.  Cardiology: last OV 5/2020-reached out to Dr. Ivey there is no reservations upcoming surgery.    Patient reports that he has has not taken anything for his RA for several months.  Denies taking any NSAIDs.    METS (metabolic equivalents of oxygen consumption)  (Climbing 1 flight of stairs, mowing the lawn, gardening, golfing without a car, double tennis, swimming, riding a bike, square dancing, jogging)    METS score 3    Patient is at  intermediate risk for surgery.     (High risk: Emergency surgery, anticipated increased blood loss,  aortic or peripheral vascular surgery)    (Intermediate risk: Abdominal or thoracic surgery, head and neck surgery, carotid endarterectomy, orthopedic surgery, prostate surgery.)    (Low risk: Breast surgery, cataract surgery, superficial surgery, endoscopy)    Indications of increased surgical risk for pulmonary complications: Obesity, sleep apnea, current tobacco user (started 6 weeks ago) has patches already.    (Cough, dyspnea, pulmonary disease, smoking, obesity, abdominal or thoracic surgery, FEV1 less than 2 L,MV V<50 percent of predicted value, PEF <100 L or 50 percent of predicted value, PCO2 greater than or equal to 45 mmHg, PO2 less than or equal to 50 mmHg.)    Have discussed general postoperative complications including: infection, pneumonia, urinary tract infection, sepsis, Pulmonary embolus, blood loss, cardiac arrest, Myocardial infarction, unplanned intubation.     Have discussed the importance of using his incentive spirometer and deep breathing exercises to help avoid pneumonia.      Lab Results   Component Value Date/Time    WBC 5.3 07/16/2020 11:53 AM    WBC 8.4 10/19/2010 12:05 PM    RBC 4.04 (L) 07/16/2020 11:53 AM    RBC 4.96 10/19/2010 12:05 PM    HEMOGLOBIN 11.5 (L) 07/16/2020 11:53 AM    HEMATOCRIT 35.7 (L) 07/16/2020 11:53 AM    MCV 88.4 07/16/2020 11:53 AM    MCV 85 10/19/2010 12:05 PM    MCH 28.5 07/16/2020 11:53 AM    MCH 28.4 10/19/2010 12:05 PM    MCHC 32.2 (L) 07/16/2020 11:53 AM    MPV 9.0 07/16/2020 11:53 AM    NEUTSPOLYS 41.20 (L) 11/08/2019 04:00 AM    LYMPHOCYTES 42.60 (H) 11/08/2019 04:00 AM    MONOCYTES 10.70 11/08/2019 04:00 AM    EOSINOPHILS 3.50 11/08/2019 04:00 AM    BASOPHILS 1.20 11/08/2019 04:00 AM    HYPOCHROMIA 1+ 10/25/2019 04:00 AM    ANISOCYTOSIS 1+ 10/25/2019 04:00 AM        Lab Results   Component Value Date/Time    SODIUM 135 07/16/2020 11:53 AM    POTASSIUM 4.4 07/16/2020 11:53 AM    CHLORIDE 102 07/16/2020 11:53 AM    CO2 25 07/16/2020 11:53 AM    GLUCOSE 86  2020 11:53 AM    BUN 12 2020 11:53 AM    CREATININE 0.85 2020 11:53 AM    CREATININE 0.95 10/19/2010 12:05 PM    BUNCREATRAT 9 10/19/2010 12:05 PM    GLOMRATE >59 10/19/2010 12:05 PM        Lab Results   Component Value Date/Time    PROTHROMBTM 13.6 10/02/2019 08:59 AM    INR 1.03 10/02/2019 08:59 AM        Results for orders placed or performed in visit on 20   EKG   Result Value Ref Range    Report       [facility]    Test Date:  2020  Pt Name:    LESLIE BADILLO             Department: [facility]  MRN:        [id number]                      Room:  Gender:     Male                         Technician:   :        1975                   Requested By:PAUL STEINER  Order #:    [id number]                    Reading MD: Chava Naidu MD    Measurements  Intervals                                Axis  Rate:       60                           P:          -24  WA:         212                          QRS:        -7  QRSD:       122                          T:          16  QT:         456  QTc:        456    Interpretive Statements  SINUS RHYTHM  FIRST DEGREE AV BLOCK  NONSPECIFIC INTRAVENTRICULAR CONDUCTION DELAY  MINIMAL ST DEPRESSION, ANTEROLATERAL LEADS  Compared to ECG 2019 14:58:56  No significant changes  Electronically Signed On 2020 12:06:43 PDT by Chava Naidu MD         Results for orders placed during the hospital encounter of 09   DX-CHEST-2 VIEWS    Impression IMPRESSION:    NORMAL CHEST.     JHW/danie          Read By TABITHA SUERO MD on May  7 2009  3:54PM  : [place] Transcription Date: May  7 2009  4:28PM  THIS DOCUMENT HAS BEEN ELECTRONICALLY SIGNED BY: TABITHA SUERO MD on May    7 2009  4:37PM                     Cardiac stress test 2019-negative stress for ischemia.  Echocardiogram, 2019-moderate aortic stenosis-    ROS:  Denies any Headache, Blurred Vision, Confusion, Chest pain,  Shortness of breath,  Abdominal  "pain, Changes of bowel or bladder, Lower ext edema, Fevers, Nights sweats, Weight Changes, Focal weakness or numbness.  And all other systems reviewed and are all negative.knee pain, nervous, stressed out        Current Outpatient Medications:   •  mupirocin (BACTROBAN) 2 % Ointment, , Disp: , Rfl:   •  nicotine (NICODERM) 7 MG/24HR PATCH 24 HR, Apply 1 Patch to skin as directed every 24 hours., Disp: 30 Patch, Rfl: 3  •  morphine (MS IR) 15 MG tablet, Take 15 mg by mouth every 6 hours as needed., Disp: , Rfl:   •  pregabalin (LYRICA) 150 MG Cap, Take 150 mg by mouth 3 times a day., Disp: , Rfl:   •  acetaminophen (TYLENOL) 500 MG Tab, Take 500-1,000 mg by mouth every 6 hours as needed., Disp: , Rfl:   •  Naloxone (NARCAN) 4 MG/0.1ML Liquid, One spray in one nostril for overdose and call 911. (Patient taking differently: Spray 1 Spray in nose as needed. One spray in one nostril for overdose and call 911.), Disp: 1 Package, Rfl: 0  •  losartan (COZAAR) 25 MG Tab, Take 1 Tab by mouth every day., Disp: 30 Tab, Rfl: 2  •  omeprazole (PRILOSEC) 40 MG delayed-release capsule, Take 1 Cap by mouth every day., Disp: 30 Cap, Rfl: 2    Allergies   Allergen Reactions   • Benadryl [Altaryl]      \"I get agitated\"   • Nsaids      Bleeding, \"I had a bleeding ulcer\"   • Phenergan [Promethazine Hcl]      \"I get very agitated\"   • Penicillins      \"Had some dizziness\"  Tolerated Unasyn 10/2019       Past Medical History:   Diagnosis Date   • ADD (attention deficit disorder)    • Alcohol abuse    • Allergic rhinitis 5/21/2009   • Anemia 09/2019   • Anxiety 5/21/2009   • Arrhythmia     afib when hospitaized for infection   • Back pain 5/21/2009   • Bipolar disorder (HCC)    • Bleeding ulcer 5/21/2009   • Bleeding ulcer 5/21/2009    History of anemia-now on nexium    • Depression 5/21/2009   • Eczema 5/21/2009   • GERD (gastroesophageal reflux disease)    • Heart murmur     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   • Hemorrhagic disorder " ([facility])     hx of bleeding ulcers   • History of bleeding ulcers 2/12/2015   • Insomnia 5/21/2009   • Migraine 5/21/2009   • Obesity, morbid ([facility]) 5/21/2009   • Osteomyelitis ([facility]) 10/7/2019   • Pain 09/2019    Chronic knee and back pain   • Pubic ramus fracture ([facility]) 9/28/2019   • Rheumatoid arteritis ([facility]) 09/2019    knee, feet   • Sleep apnea 5/21/2009    Did not tolerate cpap, does not use it   • Snoring      Past Surgical History:   Procedure Laterality Date   • TENDON TRANSFER Right 1/22/2020    Procedure: RIGHT DISTAL ULNA RESECTION AND EXTENSOR TENDON TRANSFER;  Surgeon: Marine Rushing M.D.;  Location: SURGERY [facility];  Service: Orthopedics   • IRRIGATION & DEBRIDEMENT ORTHO Left 10/9/2019    Procedure: IRRIGATION AND DEBRIDEMENT, WOUND - PELVIC OSTEOMYELITIS;  Surgeon: You Olivares M.D.;  Location: SURGERY [facility];  Service: Orthopedics   • OTHER SURGICAL PROCEDURE  2019    osteomelitis of pelvis cleaned   • GASTRIC BYPASS LAPAROSCOPIC  2000    Lucila en y   • CHOLECYSTECTOMY  2000   • OTHER SURGICAL PROCEDURE      repair of broken penis     Family History   Problem Relation Age of Onset   • Hypertension Mother    • Arthritis Mother    • Depression Mother    • Cancer Father         bladder   • Schizophrenia Father    • Cancer Maternal Grandmother         breast   • Heart Disease Maternal Grandmother    • Psychiatric Illness Other    • Stroke Maternal Aunt    • Other Neg Hx         no connective tissue disorders or known aortic disease     Social History     Socioeconomic History   • Marital status:      Spouse name: Not on file   • Number of children: Not on file   • Years of education: Not on file   • Highest education level: Not on file   Occupational History   • 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   Social Needs   • Financial resource strain: Not on file   • Food insecurity     Worry: Not on file     Inability: Not on file   • Transportation needs     Medical: Not on file      "Non-medical: Not on file   Tobacco Use   • Smoking status: Current Every Day Smoker     Packs/day: 0.25     Years: 3.00     Pack years: 0.75     Types: Cigarettes, Cigars     Last attempt to quit: 2019     Years since quittin.8   • Smokeless tobacco: Never Used   • Tobacco comment: occasional cigar   Substance and Sexual Activity   • Alcohol use: No     Alcohol/week: 0.0 oz     Comment: quit 2.5 yrs ago- past ETOH abuse   • Drug use: No   • Sexual activity: Yes     Partners: Female     Comment: ;    Lifestyle   • Physical activity     Days per week: Not on file     Minutes per session: Not on file   • Stress: Not on file   Relationships   • Social connections     Talks on phone: Not on file     Gets together: Not on file     Attends Judaism service: Not on file     Active member of club or organization: Not on file     Attends meetings of clubs or organizations: Not on file     Relationship status: Not on file   • Intimate partner violence     Fear of current or ex partner: Not on file     Emotionally abused: Not on file     Physically abused: Not on file     Forced sexual activity: Not on file   Other Topics Concern   •  Service No   • Blood Transfusions Yes   • Caffeine Concern No   • Occupational Exposure No   • Hobby Hazards No   • Sleep Concern No   • Stress Concern Yes   • Weight Concern Yes   • Special Diet No   • Back Care No   • Exercise No   • Bike Helmet No   • Seat Belt Yes   • Self-Exams Yes   Social History Narrative    , lives in [place]       Objective:     Vitals: /82 (BP Location: Right arm, Patient Position: Sitting, BP Cuff Size: Adult)   Pulse 92   Temp 36.7 °C (98 °F)   Ht 1.803 m (5' 11\")   Wt 109.7 kg (241 lb 12.8 oz)   SpO2 96%   BMI 33.72 kg/m²    General: Alert, pleasant, NAD, obese, using cane  HEENT: Normocephalic.  Neck supple.  No thyromegaly or masses palpated. No cervical or supraclavicular lymphadenopathy.  Heart: Regular rate and rhythm.  " S1 and S2 normal.  +murmur present  Respiratory: Normal respiratory effort.  Clear to auscultation bilaterally. No wheezing  Skin: Warm, dry, dry, plaque like flaky skin on bilateral LE, medial and lateral aspects.  Extremities: +BLE edema. Rash on BLE  Neurological: No tremors, antalgic gait, wide based.  Psych:  Affect/mood is normal, judgement is good, memory is intact, grooming is appropriate.    Assessment/Plan:      was seen today for pre-op exam.    Diagnoses and all orders for this visit:    Pre-op evaluation  Patient is at intermediate risk for his upcoming orthopedic surgery.  From a primary care standpoint he is cleared for surgery.  Have discussed with patient that is imperative for him to quit smoking-restarted 6 weeks ago.  Have refilled nicotine patches for the patient.  Have discussed general postoperative complications.     Chronic pain of right knee  Chronic.  Scheduled for right total knee replacement July 22, 2020.    Cigarette nicotine dependence without complication  Recently started smoking again about 6 weeks ago.  Stressed the importance of tobacco cessation.  Have discussed risk of delayed wound healing as well as pneumonia.   -     nicotine (NICODERM) 7 MG/24HR PATCH 24 HR; Apply 1 Patch to skin as directed every 24 hours.    Obesity (BMI 35.0-39.9 without comorbidity) (HCC)  Chronic problem for the patient.  Chronic. Patient encouraged to reduce excess calorie consumption. Encouraged regular exercise. Discussed long term sequelae of obesity.    Aortic stenosis, moderate  Chronic problem for the patient.  Stable at this time.  Cleared for surgery per cardiology.  Continue to follow-up with cardiology routinely for this.    Other iron deficiency anemia  Chronic problem for the patient.  Most recent H&H is stable.  Routinely followed by gastroenterology.  Unable to take NSAIDs.    Rheumatoid arthritis involving both hands with positive rheumatoid factor (HCC)  Chronic problem for the  patient.  Followed by rheumatology for this.  Reports that he has not been on any medications in several months for this.    MONICA (obstructive sleep apnea)  Chronic problem for the patient.  Needs follow-up with sleep studies.    History of atrial fibrillation  History of in the setting of osteomyelitis.  Has had no recurrence of symptoms.  Followed by cardiology for this.    Brit AGUDELO

## 2020-07-22 ENCOUNTER — HOSPITAL ENCOUNTER (INPATIENT)
Facility: MEDICAL CENTER | Age: 45
LOS: 4 days | DRG: 470 | End: 2020-07-27
Attending: ORTHOPAEDIC SURGERY | Admitting: ORTHOPAEDIC SURGERY
Payer: COMMERCIAL

## 2020-07-22 ENCOUNTER — APPOINTMENT (OUTPATIENT)
Dept: RADIOLOGY | Facility: MEDICAL CENTER | Age: 45
DRG: 470 | End: 2020-07-22
Attending: ORTHOPAEDIC SURGERY
Payer: COMMERCIAL

## 2020-07-22 ENCOUNTER — ANESTHESIA (OUTPATIENT)
Dept: SURGERY | Facility: MEDICAL CENTER | Age: 45
DRG: 470 | End: 2020-07-22
Payer: COMMERCIAL

## 2020-07-22 ENCOUNTER — ANESTHESIA EVENT (OUTPATIENT)
Dept: SURGERY | Facility: MEDICAL CENTER | Age: 45
DRG: 470 | End: 2020-07-22
Payer: COMMERCIAL

## 2020-07-22 DIAGNOSIS — I48.0 PAROXYSMAL ATRIAL FIBRILLATION (HCC): ICD-10-CM

## 2020-07-22 DIAGNOSIS — D62 ACUTE BLOOD LOSS ANEMIA: ICD-10-CM

## 2020-07-22 DIAGNOSIS — G89.29 CHRONIC PAIN OF RIGHT KNEE: Chronic | ICD-10-CM

## 2020-07-22 DIAGNOSIS — M05.761 RHEUMATOID ARTHRITIS INVOLVING RIGHT KNEE WITH POSITIVE RHEUMATOID FACTOR (HCC): Chronic | ICD-10-CM

## 2020-07-22 DIAGNOSIS — M25.561 CHRONIC PAIN OF RIGHT KNEE: Chronic | ICD-10-CM

## 2020-07-22 PROCEDURE — 160041 HCHG SURGERY MINUTES - EA ADDL 1 MIN LEVEL 4: Performed by: ORTHOPAEDIC SURGERY

## 2020-07-22 PROCEDURE — 160009 HCHG ANES TIME/MIN: Performed by: ORTHOPAEDIC SURGERY

## 2020-07-22 PROCEDURE — 500367 HCHG DRAIN KIT, HEMOVAC: Performed by: ORTHOPAEDIC SURGERY

## 2020-07-22 PROCEDURE — 700111 HCHG RX REV CODE 636 W/ 250 OVERRIDE (IP): Performed by: ORTHOPAEDIC SURGERY

## 2020-07-22 PROCEDURE — 160036 HCHG PACU - EA ADDL 30 MINS PHASE I: Performed by: ORTHOPAEDIC SURGERY

## 2020-07-22 PROCEDURE — L1830 KO IMMOB CANVAS LONG PRE OTS: HCPCS | Performed by: ORTHOPAEDIC SURGERY

## 2020-07-22 PROCEDURE — A4306 DRUG DELIVERY SYSTEM <=50 ML: HCPCS | Performed by: ANESTHESIOLOGY

## 2020-07-22 PROCEDURE — 700101 HCHG RX REV CODE 250

## 2020-07-22 PROCEDURE — A9270 NON-COVERED ITEM OR SERVICE: HCPCS

## 2020-07-22 PROCEDURE — 700111 HCHG RX REV CODE 636 W/ 250 OVERRIDE (IP): Performed by: ANESTHESIOLOGY

## 2020-07-22 PROCEDURE — A9270 NON-COVERED ITEM OR SERVICE: HCPCS | Performed by: ANESTHESIOLOGY

## 2020-07-22 PROCEDURE — G0378 HOSPITAL OBSERVATION PER HR: HCPCS

## 2020-07-22 PROCEDURE — 502579 HCHG PACK, TOTAL KNEE: Performed by: ORTHOPAEDIC SURGERY

## 2020-07-22 PROCEDURE — 73560 X-RAY EXAM OF KNEE 1 OR 2: CPT | Mod: RT

## 2020-07-22 PROCEDURE — A9270 NON-COVERED ITEM OR SERVICE: HCPCS | Performed by: ORTHOPAEDIC SURGERY

## 2020-07-22 PROCEDURE — C1776 JOINT DEVICE (IMPLANTABLE): HCPCS | Performed by: ORTHOPAEDIC SURGERY

## 2020-07-22 PROCEDURE — 700102 HCHG RX REV CODE 250 W/ 637 OVERRIDE(OP): Performed by: ANESTHESIOLOGY

## 2020-07-22 PROCEDURE — 700102 HCHG RX REV CODE 250 W/ 637 OVERRIDE(OP)

## 2020-07-22 PROCEDURE — 160035 HCHG PACU - 1ST 60 MINS PHASE I: Performed by: ORTHOPAEDIC SURGERY

## 2020-07-22 PROCEDURE — 501838 HCHG SUTURE GENERAL: Performed by: ORTHOPAEDIC SURGERY

## 2020-07-22 PROCEDURE — L8699 PROSTHETIC IMPLANT NOS: HCPCS | Performed by: ORTHOPAEDIC SURGERY

## 2020-07-22 PROCEDURE — 160048 HCHG OR STATISTICAL LEVEL 1-5: Performed by: ORTHOPAEDIC SURGERY

## 2020-07-22 PROCEDURE — 700105 HCHG RX REV CODE 258: Performed by: ORTHOPAEDIC SURGERY

## 2020-07-22 PROCEDURE — 160029 HCHG SURGERY MINUTES - 1ST 30 MINS LEVEL 4: Performed by: ORTHOPAEDIC SURGERY

## 2020-07-22 PROCEDURE — 110372 HCHG SHELL REV 278: Performed by: ORTHOPAEDIC SURGERY

## 2020-07-22 PROCEDURE — 700102 HCHG RX REV CODE 250 W/ 637 OVERRIDE(OP): Performed by: ORTHOPAEDIC SURGERY

## 2020-07-22 PROCEDURE — 160002 HCHG RECOVERY MINUTES (STAT): Performed by: ORTHOPAEDIC SURGERY

## 2020-07-22 PROCEDURE — 700101 HCHG RX REV CODE 250: Performed by: ANESTHESIOLOGY

## 2020-07-22 PROCEDURE — 64447 NJX AA&/STRD FEMORAL NRV IMG: CPT | Performed by: ORTHOPAEDIC SURGERY

## 2020-07-22 PROCEDURE — 700101 HCHG RX REV CODE 250: Performed by: ORTHOPAEDIC SURGERY

## 2020-07-22 PROCEDURE — 502000 HCHG MISC OR IMPLANTS RC 0278: Performed by: ORTHOPAEDIC SURGERY

## 2020-07-22 PROCEDURE — 700112 HCHG RX REV CODE 229: Performed by: ORTHOPAEDIC SURGERY

## 2020-07-22 PROCEDURE — 0SRC0J9 REPLACEMENT OF RIGHT KNEE JOINT WITH SYNTHETIC SUBSTITUTE, CEMENTED, OPEN APPROACH: ICD-10-PCS | Performed by: ORTHOPAEDIC SURGERY

## 2020-07-22 DEVICE — IMPLANTABLE DEVICE: Type: IMPLANTABLE DEVICE | Site: KNEE | Status: FUNCTIONAL

## 2020-07-22 DEVICE — BONE CEMENT SIMPLEX FULL DOSE - (10EA/PK): Type: IMPLANTABLE DEVICE | Site: KNEE | Status: FUNCTIONAL

## 2020-07-22 DEVICE — STEM CEMENT LEGION STRAIGHT 14MMX120MM: Type: IMPLANTABLE DEVICE | Site: KNEE | Status: FUNCTIONAL

## 2020-07-22 DEVICE — PATELLA GII OVAL RESURFACING PAT 38MM: Type: IMPLANTABLE DEVICE | Site: KNEE | Status: FUNCTIONAL

## 2020-07-22 RX ORDER — POLYETHYLENE GLYCOL 3350 17 G/17G
1 POWDER, FOR SOLUTION ORAL 2 TIMES DAILY PRN
Status: DISCONTINUED | OUTPATIENT
Start: 2020-07-22 | End: 2020-07-27 | Stop reason: HOSPADM

## 2020-07-22 RX ORDER — OMEPRAZOLE 20 MG/1
40 CAPSULE, DELAYED RELEASE ORAL DAILY
Status: DISCONTINUED | OUTPATIENT
Start: 2020-07-22 | End: 2020-07-23

## 2020-07-22 RX ORDER — TRANEXAMIC ACID 100 MG/ML
INJECTION, SOLUTION INTRAVENOUS PRN
Status: DISCONTINUED | OUTPATIENT
Start: 2020-07-22 | End: 2020-07-22 | Stop reason: HOSPADM

## 2020-07-22 RX ORDER — PREGABALIN 75 MG/1
150 CAPSULE ORAL 3 TIMES DAILY
Status: DISCONTINUED | OUTPATIENT
Start: 2020-07-22 | End: 2020-07-27 | Stop reason: HOSPADM

## 2020-07-22 RX ORDER — ONDANSETRON 2 MG/ML
INJECTION INTRAMUSCULAR; INTRAVENOUS PRN
Status: DISCONTINUED | OUTPATIENT
Start: 2020-07-22 | End: 2020-07-22 | Stop reason: SURG

## 2020-07-22 RX ORDER — HYDRALAZINE HYDROCHLORIDE 20 MG/ML
5 INJECTION INTRAMUSCULAR; INTRAVENOUS
Status: DISCONTINUED | OUTPATIENT
Start: 2020-07-22 | End: 2020-07-22 | Stop reason: HOSPADM

## 2020-07-22 RX ORDER — AMOXICILLIN 250 MG
1 CAPSULE ORAL
Status: DISCONTINUED | OUTPATIENT
Start: 2020-07-22 | End: 2020-07-27 | Stop reason: HOSPADM

## 2020-07-22 RX ORDER — OXYCODONE HCL 5 MG/5 ML
5 SOLUTION, ORAL ORAL
Status: COMPLETED | OUTPATIENT
Start: 2020-07-22 | End: 2020-07-22

## 2020-07-22 RX ORDER — MORPHINE SULFATE 15 MG/1
15 TABLET ORAL EVERY 6 HOURS PRN
Status: DISCONTINUED | OUTPATIENT
Start: 2020-07-22 | End: 2020-07-22

## 2020-07-22 RX ORDER — OXYCODONE HCL 5 MG/5 ML
10 SOLUTION, ORAL ORAL
Status: COMPLETED | OUTPATIENT
Start: 2020-07-22 | End: 2020-07-22

## 2020-07-22 RX ORDER — MEPERIDINE HYDROCHLORIDE 25 MG/ML
12.5 INJECTION INTRAMUSCULAR; INTRAVENOUS; SUBCUTANEOUS
Status: DISCONTINUED | OUTPATIENT
Start: 2020-07-22 | End: 2020-07-22 | Stop reason: HOSPADM

## 2020-07-22 RX ORDER — SODIUM CHLORIDE 9 MG/ML
INJECTION, SOLUTION INTRAMUSCULAR; INTRAVENOUS; SUBCUTANEOUS
Status: DISCONTINUED | OUTPATIENT
Start: 2020-07-22 | End: 2020-07-22 | Stop reason: HOSPADM

## 2020-07-22 RX ORDER — ACETAMINOPHEN 500 MG
1000 TABLET ORAL ONCE
Status: COMPLETED | OUTPATIENT
Start: 2020-07-22 | End: 2020-07-22

## 2020-07-22 RX ORDER — KETAMINE HYDROCHLORIDE 50 MG/ML
INJECTION, SOLUTION INTRAMUSCULAR; INTRAVENOUS PRN
Status: DISCONTINUED | OUTPATIENT
Start: 2020-07-22 | End: 2020-07-22 | Stop reason: SURG

## 2020-07-22 RX ORDER — HYDROMORPHONE HYDROCHLORIDE 2 MG/ML
INJECTION, SOLUTION INTRAMUSCULAR; INTRAVENOUS; SUBCUTANEOUS PRN
Status: DISCONTINUED | OUTPATIENT
Start: 2020-07-22 | End: 2020-07-22 | Stop reason: SURG

## 2020-07-22 RX ORDER — KETOROLAC TROMETHAMINE 30 MG/ML
INJECTION, SOLUTION INTRAMUSCULAR; INTRAVENOUS
Status: DISCONTINUED | OUTPATIENT
Start: 2020-07-22 | End: 2020-07-22 | Stop reason: HOSPADM

## 2020-07-22 RX ORDER — DOCUSATE SODIUM 100 MG/1
100 CAPSULE, LIQUID FILLED ORAL 2 TIMES DAILY
Status: DISCONTINUED | OUTPATIENT
Start: 2020-07-22 | End: 2020-07-27 | Stop reason: HOSPADM

## 2020-07-22 RX ORDER — HALOPERIDOL 5 MG/ML
1 INJECTION INTRAMUSCULAR
Status: DISCONTINUED | OUTPATIENT
Start: 2020-07-22 | End: 2020-07-22 | Stop reason: HOSPADM

## 2020-07-22 RX ORDER — OXYCODONE HCL 10 MG/1
10 TABLET, FILM COATED, EXTENDED RELEASE ORAL ONCE
Status: COMPLETED | OUTPATIENT
Start: 2020-07-22 | End: 2020-07-22

## 2020-07-22 RX ORDER — DEXAMETHASONE SODIUM PHOSPHATE 4 MG/ML
6 INJECTION, SOLUTION INTRA-ARTICULAR; INTRALESIONAL; INTRAMUSCULAR; INTRAVENOUS; SOFT TISSUE ONCE
Status: COMPLETED | OUTPATIENT
Start: 2020-07-23 | End: 2020-07-23

## 2020-07-22 RX ORDER — BUPIVACAINE HYDROCHLORIDE AND EPINEPHRINE 2.5; 5 MG/ML; UG/ML
INJECTION, SOLUTION EPIDURAL; INFILTRATION; INTRACAUDAL; PERINEURAL
Status: DISCONTINUED | OUTPATIENT
Start: 2020-07-22 | End: 2020-07-22 | Stop reason: HOSPADM

## 2020-07-22 RX ORDER — BISACODYL 10 MG
10 SUPPOSITORY, RECTAL RECTAL
Status: DISCONTINUED | OUTPATIENT
Start: 2020-07-22 | End: 2020-07-27 | Stop reason: HOSPADM

## 2020-07-22 RX ORDER — CELECOXIB 200 MG/1
200 CAPSULE ORAL ONCE
Status: COMPLETED | OUTPATIENT
Start: 2020-07-22 | End: 2020-07-22

## 2020-07-22 RX ORDER — SCOLOPAMINE TRANSDERMAL SYSTEM 1 MG/1
1 PATCH, EXTENDED RELEASE TRANSDERMAL
Status: DISCONTINUED | OUTPATIENT
Start: 2020-07-22 | End: 2020-07-27 | Stop reason: HOSPADM

## 2020-07-22 RX ORDER — ZOLPIDEM TARTRATE 5 MG/1
5 TABLET ORAL NIGHTLY PRN
Status: DISCONTINUED | OUTPATIENT
Start: 2020-07-23 | End: 2020-07-27 | Stop reason: HOSPADM

## 2020-07-22 RX ORDER — LIDOCAINE HYDROCHLORIDE 20 MG/ML
INJECTION, SOLUTION EPIDURAL; INFILTRATION; INTRACAUDAL; PERINEURAL PRN
Status: DISCONTINUED | OUTPATIENT
Start: 2020-07-22 | End: 2020-07-22 | Stop reason: SURG

## 2020-07-22 RX ORDER — ONDANSETRON 2 MG/ML
4 INJECTION INTRAMUSCULAR; INTRAVENOUS EVERY 4 HOURS PRN
Status: DISCONTINUED | OUTPATIENT
Start: 2020-07-22 | End: 2020-07-27 | Stop reason: HOSPADM

## 2020-07-22 RX ORDER — DEXAMETHASONE SODIUM PHOSPHATE 4 MG/ML
INJECTION, SOLUTION INTRA-ARTICULAR; INTRALESIONAL; INTRAMUSCULAR; INTRAVENOUS; SOFT TISSUE PRN
Status: DISCONTINUED | OUTPATIENT
Start: 2020-07-22 | End: 2020-07-22 | Stop reason: SURG

## 2020-07-22 RX ORDER — ACETAMINOPHEN 500 MG
1000 TABLET ORAL EVERY 6 HOURS
Status: DISCONTINUED | OUTPATIENT
Start: 2020-07-22 | End: 2020-07-27 | Stop reason: HOSPADM

## 2020-07-22 RX ORDER — ONDANSETRON 2 MG/ML
4 INJECTION INTRAMUSCULAR; INTRAVENOUS
Status: COMPLETED | OUTPATIENT
Start: 2020-07-22 | End: 2020-07-22

## 2020-07-22 RX ORDER — SODIUM CHLORIDE, SODIUM LACTATE, POTASSIUM CHLORIDE, CALCIUM CHLORIDE 600; 310; 30; 20 MG/100ML; MG/100ML; MG/100ML; MG/100ML
1000 INJECTION, SOLUTION INTRAVENOUS
Status: DISCONTINUED | OUTPATIENT
Start: 2020-07-22 | End: 2020-07-22 | Stop reason: HOSPADM

## 2020-07-22 RX ORDER — HYDROMORPHONE HYDROCHLORIDE 1 MG/ML
0.2 INJECTION, SOLUTION INTRAMUSCULAR; INTRAVENOUS; SUBCUTANEOUS
Status: DISCONTINUED | OUTPATIENT
Start: 2020-07-22 | End: 2020-07-22 | Stop reason: HOSPADM

## 2020-07-22 RX ORDER — MORPHINE SULFATE 15 MG/1
15 TABLET ORAL EVERY 6 HOURS PRN
Status: DISCONTINUED | OUTPATIENT
Start: 2020-07-22 | End: 2020-07-27 | Stop reason: HOSPADM

## 2020-07-22 RX ORDER — DIPHENHYDRAMINE HYDROCHLORIDE 50 MG/ML
25 INJECTION INTRAMUSCULAR; INTRAVENOUS EVERY 6 HOURS PRN
Status: DISCONTINUED | OUTPATIENT
Start: 2020-07-22 | End: 2020-07-27 | Stop reason: HOSPADM

## 2020-07-22 RX ORDER — MAGNESIUM SULFATE HEPTAHYDRATE 40 MG/ML
INJECTION, SOLUTION INTRAVENOUS PRN
Status: DISCONTINUED | OUTPATIENT
Start: 2020-07-22 | End: 2020-07-22 | Stop reason: HOSPADM

## 2020-07-22 RX ORDER — ENEMA 19; 7 G/133ML; G/133ML
1 ENEMA RECTAL
Status: DISCONTINUED | OUTPATIENT
Start: 2020-07-22 | End: 2020-07-27 | Stop reason: HOSPADM

## 2020-07-22 RX ORDER — HYDROMORPHONE HYDROCHLORIDE 1 MG/ML
0.6 INJECTION, SOLUTION INTRAMUSCULAR; INTRAVENOUS; SUBCUTANEOUS
Status: DISCONTINUED | OUTPATIENT
Start: 2020-07-22 | End: 2020-07-22 | Stop reason: HOSPADM

## 2020-07-22 RX ORDER — AMOXICILLIN 250 MG
1 CAPSULE ORAL NIGHTLY
Status: DISCONTINUED | OUTPATIENT
Start: 2020-07-22 | End: 2020-07-27 | Stop reason: HOSPADM

## 2020-07-22 RX ORDER — SODIUM CHLORIDE, SODIUM LACTATE, POTASSIUM CHLORIDE, CALCIUM CHLORIDE 600; 310; 30; 20 MG/100ML; MG/100ML; MG/100ML; MG/100ML
INJECTION, SOLUTION INTRAVENOUS CONTINUOUS
Status: DISCONTINUED | OUTPATIENT
Start: 2020-07-22 | End: 2020-07-22 | Stop reason: HOSPADM

## 2020-07-22 RX ORDER — DEXAMETHASONE SODIUM PHOSPHATE 4 MG/ML
4 INJECTION, SOLUTION INTRA-ARTICULAR; INTRALESIONAL; INTRAMUSCULAR; INTRAVENOUS; SOFT TISSUE
Status: COMPLETED | OUTPATIENT
Start: 2020-07-22 | End: 2020-07-23

## 2020-07-22 RX ORDER — ROPIVACAINE HYDROCHLORIDE 5 MG/ML
INJECTION, SOLUTION EPIDURAL; INFILTRATION; PERINEURAL
Status: COMPLETED | OUTPATIENT
Start: 2020-07-22 | End: 2020-07-22

## 2020-07-22 RX ORDER — HYDROMORPHONE HYDROCHLORIDE 1 MG/ML
0.4 INJECTION, SOLUTION INTRAMUSCULAR; INTRAVENOUS; SUBCUTANEOUS
Status: DISCONTINUED | OUTPATIENT
Start: 2020-07-22 | End: 2020-07-22 | Stop reason: HOSPADM

## 2020-07-22 RX ORDER — LOSARTAN POTASSIUM 25 MG/1
25 TABLET ORAL DAILY
Status: DISCONTINUED | OUTPATIENT
Start: 2020-07-22 | End: 2020-07-27 | Stop reason: HOSPADM

## 2020-07-22 RX ORDER — LIDOCAINE HYDROCHLORIDE 10 MG/ML
INJECTION, SOLUTION INFILTRATION; PERINEURAL
Status: COMPLETED
Start: 2020-07-22 | End: 2020-07-22

## 2020-07-22 RX ADMIN — SODIUM CHLORIDE, POTASSIUM CHLORIDE, SODIUM LACTATE AND CALCIUM CHLORIDE: 600; 310; 30; 20 INJECTION, SOLUTION INTRAVENOUS at 14:42

## 2020-07-22 RX ADMIN — ACETAMINOPHEN 1000 MG: 500 TABLET, FILM COATED ORAL at 20:28

## 2020-07-22 RX ADMIN — CEFAZOLIN 2 G: 10 INJECTION, POWDER, FOR SOLUTION INTRAVENOUS; PARENTERAL at 20:37

## 2020-07-22 RX ADMIN — TRANEXAMIC ACID 1500 MG: 100 INJECTION, SOLUTION INTRAVENOUS at 13:24

## 2020-07-22 RX ADMIN — HYDROMORPHONE HYDROCHLORIDE 0.2 MG: 1 INJECTION, SOLUTION INTRAMUSCULAR; INTRAVENOUS; SUBCUTANEOUS at 16:52

## 2020-07-22 RX ADMIN — PROPOFOL 200 MG: 10 INJECTION, EMULSION INTRAVENOUS at 13:20

## 2020-07-22 RX ADMIN — ONDANSETRON 4 MG: 2 INJECTION INTRAMUSCULAR; INTRAVENOUS at 17:22

## 2020-07-22 RX ADMIN — LIDOCAINE HYDROCHLORIDE: 10 INJECTION, SOLUTION INFILTRATION; PERINEURAL at 12:21

## 2020-07-22 RX ADMIN — HYDROMORPHONE HYDROCHLORIDE 0.5 MG: 2 INJECTION, SOLUTION INTRAMUSCULAR; INTRAVENOUS; SUBCUTANEOUS at 13:21

## 2020-07-22 RX ADMIN — FENTANYL CITRATE 50 MCG: 50 INJECTION INTRAMUSCULAR; INTRAVENOUS at 16:36

## 2020-07-22 RX ADMIN — HYDROMORPHONE HYDROCHLORIDE 0.5 MG: 2 INJECTION, SOLUTION INTRAMUSCULAR; INTRAVENOUS; SUBCUTANEOUS at 14:07

## 2020-07-22 RX ADMIN — MEPERIDINE HYDROCHLORIDE 12.5 MG: 25 INJECTION INTRAMUSCULAR; INTRAVENOUS; SUBCUTANEOUS at 16:26

## 2020-07-22 RX ADMIN — PREGABALIN 150 MG: 75 CAPSULE ORAL at 20:28

## 2020-07-22 RX ADMIN — STANDARDIZED SENNA CONCENTRATE AND DOCUSATE SODIUM 1 TABLET: 8.6; 5 TABLET, FILM COATED ORAL at 21:00

## 2020-07-22 RX ADMIN — ACETAMINOPHEN 1000 MG: 500 TABLET, FILM COATED ORAL at 12:20

## 2020-07-22 RX ADMIN — MAGNESIUM SULFATE IN WATER 4 G: 40 INJECTION, SOLUTION INTRAVENOUS at 13:24

## 2020-07-22 RX ADMIN — LOSARTAN POTASSIUM 25 MG: 25 TABLET ORAL at 16:15

## 2020-07-22 RX ADMIN — ROPIVACAINE HYDROCHLORIDE 20 ML: 5 INJECTION, SOLUTION EPIDURAL; INFILTRATION; PERINEURAL at 15:52

## 2020-07-22 RX ADMIN — HYDROMORPHONE HYDROCHLORIDE 0.5 MG: 2 INJECTION, SOLUTION INTRAMUSCULAR; INTRAVENOUS; SUBCUTANEOUS at 15:14

## 2020-07-22 RX ADMIN — NICOTINE 7 MG: 7 PATCH TRANSDERMAL at 20:29

## 2020-07-22 RX ADMIN — MORPHINE SULFATE 30 MG: 15 TABLET ORAL at 18:47

## 2020-07-22 RX ADMIN — FENTANYL CITRATE 50 MCG: 50 INJECTION INTRAMUSCULAR; INTRAVENOUS at 16:19

## 2020-07-22 RX ADMIN — HALOPERIDOL LACTATE 1 MG: 5 INJECTION, SOLUTION INTRAMUSCULAR at 17:39

## 2020-07-22 RX ADMIN — LIDOCAINE HYDROCHLORIDE 60 MG: 20 INJECTION, SOLUTION EPIDURAL; INFILTRATION; INTRACAUDAL; PERINEURAL at 13:20

## 2020-07-22 RX ADMIN — FENTANYL CITRATE 50 MCG: 50 INJECTION, SOLUTION INTRAMUSCULAR; INTRAVENOUS at 14:08

## 2020-07-22 RX ADMIN — EPHEDRINE SULFATE 10 MG: 50 INJECTION INTRAMUSCULAR; INTRAVENOUS; SUBCUTANEOUS at 15:03

## 2020-07-22 RX ADMIN — OXYCODONE HYDROCHLORIDE 10 MG: 10 TABLET, FILM COATED, EXTENDED RELEASE ORAL at 12:20

## 2020-07-22 RX ADMIN — KETAMINE HYDROCHLORIDE 50 MG: 50 INJECTION INTRAMUSCULAR; INTRAVENOUS at 13:20

## 2020-07-22 RX ADMIN — ONDANSETRON 4 MG: 2 INJECTION INTRAMUSCULAR; INTRAVENOUS at 15:12

## 2020-07-22 RX ADMIN — FENTANYL CITRATE 100 MCG: 50 INJECTION, SOLUTION INTRAMUSCULAR; INTRAVENOUS at 13:20

## 2020-07-22 RX ADMIN — SODIUM CHLORIDE, POTASSIUM CHLORIDE, SODIUM LACTATE AND CALCIUM CHLORIDE 1000 ML: 600; 310; 30; 20 INJECTION, SOLUTION INTRAVENOUS at 12:34

## 2020-07-22 RX ADMIN — KETAMINE HYDROCHLORIDE 25 MG: 50 INJECTION INTRAMUSCULAR; INTRAVENOUS at 14:07

## 2020-07-22 RX ADMIN — TRANEXAMIC ACID 2000 MG: 100 INJECTION, SOLUTION INTRAVENOUS at 16:42

## 2020-07-22 RX ADMIN — SENNOSIDES-DOCUSATE SODIUM TAB 8.6-50 MG 1 TABLET: 8.6-5 TAB at 20:29

## 2020-07-22 RX ADMIN — ROPIVACAINE HYDROCHLORIDE: 2 INJECTION, SOLUTION EPIDURAL; INFILTRATION at 16:51

## 2020-07-22 RX ADMIN — DOCUSATE SODIUM 100 MG: 100 CAPSULE, LIQUID FILLED ORAL at 20:28

## 2020-07-22 RX ADMIN — EPHEDRINE SULFATE 10 MG: 50 INJECTION INTRAMUSCULAR; INTRAVENOUS; SUBCUTANEOUS at 13:34

## 2020-07-22 RX ADMIN — POVIDONE-IODINE 15 ML: 10 SOLUTION TOPICAL at 12:38

## 2020-07-22 RX ADMIN — CELECOXIB 200 MG: 200 CAPSULE ORAL at 12:21

## 2020-07-22 RX ADMIN — DEXAMETHASONE SODIUM PHOSPHATE 8 MG: 4 INJECTION, SOLUTION INTRAMUSCULAR; INTRAVENOUS at 13:24

## 2020-07-22 RX ADMIN — VANCOMYCIN HYDROCHLORIDE 1500 MG: 1 INJECTION, POWDER, LYOPHILIZED, FOR SOLUTION INTRAVENOUS at 12:32

## 2020-07-22 ASSESSMENT — PAIN SCALES - GENERAL: PAIN_LEVEL: 6

## 2020-07-22 NOTE — OP REPORT
DATE OF SERVICE:  07/22/2020    LOCATION:  [facility].    PREOPERATIVE DIAGNOSES:  Rheumatoid arthritis, right knee, with severe valgus   deformity and medial collateral ligament insufficiency.    POSTOPERATIVE DIAGNOSES:  Rheumatoid arthritis, right knee, with severe valgus   deformity and medial collateral ligament insufficiency.    PROCEDURE:  Right total knee arthroplasty using Smith and Nephew guided motion   hinge implant.    SURGEON:  Temo Pires MD    ANESTHESIA:  General with postoperatively placed adductor catheter for pain   relief.    ANESTHESIOLOGIST:  Rufino Tolentino MD    ASSISTANT:  Mario Alberto Chapin PA-C    ESTIMATED BLOOD LOSS:  250 mL.    COMPONENTS PLACED:  A Smith and Nephew guided hinge size 7 femur with a   cemented 16x120 mm stem, a size 7 tibia with medial and lateral 5 mm augments,   a cemented 14x120 mm stem, a 15 mm poly insert and a 35 mm oval patella.    FINDINGS:  Severe valgus deformity with posterolateral tibial bone loss and   insufficient medial collateral ligament.    COMPLICATIONS:  None.    CPT CODE:  59367-42.    REASON FOR 22 MODIFIER:  The patient had a severe valgus deformity with bone   loss and ligamentous insufficiency that required the use of a primary hinged   implant with cemented stems.  This added complexity of the case that required   a longer incision and it required over 2 times the standard length of time for   regular primary total knee replacement.    In addition, the patient is obese with a very large right leg that added even   more to the difficulty and complexity.    SUMMARY:  The patient was brought to the operating room and anesthesia was   administered.  Vancomycin, Ancef were given IV and right leg prepped and   draped in usual sterile manner.  Leg was exsanguinated, tourniquet inflated,   time-out was called.    We made an anterior incision centered over the patella medially.  It was   longer than standard because we knew we would have a larger  exposure.    A standard medial parapatellar arthrotomy was made.  He had a huge amount of   synovial fluid, probably at least half a gallon.  It just was feeling almost   the entire distal thigh.  We took time to do a huge synovectomy up underneath   the quad and in the medial and lateral gutters.    We everted the patella.  It measured ____.  We cut it down to uniform 15 mm.    The 35 button had the best coverage.  We prepared that and resected excess   bone.  Knee was then flexed up.  The tourniquet was released.  For the   majority of the case when tourniquet released, we did have to re-inflate it a   couple of times just to help control the bleeding as we extended and flexed   the knee, checking implants and doing the releases.    We then turned our attention to the femur with the knee flexed.  We used a   6-degree jig in, we cut the standard 9 mm off the distal femur.  He measured   to a size 7, which was what he templated to.  We initially made all the cuts   were just to size 7 standard Legion implant.    We then reamed up into the canal.  He had a very wide canal where we usually   going to get a 16 mm stem in there.  We centered the size 7 revision jig on   there.  We made our increased posterior condyle cuts of 10 extra mm to drill   off of the hinge and did our bore reamers to allow for the stem.  There was no   need for any offset.  We did set rotation carefully with the initial primary   jig set.    We then exposed the tibia.  We used an intramedullary guide system with the   revision cutting block and cut it down to where it was almost flush with the   posterolateral bone loss.  We sized it to a size 7.  Once again, we did not   need an offset and we were using a 14 mm cemented stem.  We were able to trial   at this point and realized that the 5 mm augments medially and laterally on   the tibia were going to help us, so we had resected a fair amount tibia and he   was extremely ligament lax so with the  augments, we had some good options for   stability.  Patella was tracking nicely.  He had no real medial collateral.    We did have to do some posterolateral releases of the IT band and capsule to   allow him to get to full extension with a 15 mm insert.  We did keep the   popliteus tendon intact.  It was not rigid.  It was pretty supple.    At this point, we had released the tourniquet back down.  We stopped all the   bleeders with cautery.  We had soaked with dilute Betadine.  We plugged up the   femoral and tibial canals with cement plugs and using a total of 3 batches of   cement with a total of 3 g of powdered vancomycin, we cemented the components   into place.  Cement debris was removed.  We felt the 15 mm was our best   implant at that point, so it was secured into position with all the   appropriate care to lock the poly with the posterior screw and to do the   locking pin on the poly into the tibia.    He was nice and stable, had good full extension.  Patella tracked nicely.  We   did soak it with dilute Betadine one more time for a few minutes.    He had a huge dead space where the synovial fluid was and a synovectomy had   been carried out, so we did place a deep Hemovac drain and suprapatellar pouch   around it out laterally, so he would be away from the Prevena dressing.    We placed the knee in flexion and had a ____ with a running #2 Quill.  We   augmented that with a whole another layer of interrupted #1 Vicryl.  The   remainder of the local was injected into the knee joint.  The skin was closed   with multiple layers of 2-0 Vicryl and running 3-0 Monocryl.    At the time of this dictation, adductor catheter was being placed with the   plan was for Prevena dressing, compression wrap and a knee immobilizer.  He   was stable during the procedure and at the time of this dictation.       ____________________________________     MD MANJIT SHEN / ZACKERY    DD:  07/22/2020 15:54:41  DT:  07/22/2020  16:57:18    D#:  [id number]  Job#:  [id number]

## 2020-07-22 NOTE — OR SURGEON
Immediate Post OP Note    PreOp Diagnosis: Rheumatoid Arthritis R knee with severe valgus and insufficient MCL    PostOp Diagnosis: same    Procedure(s):  ARTHROPLASTY, Left KNEE, TOTAL - Increased level of difficulty- Wound Class: Clean    Surgeon(s):  Temo Pires M.D.    Anesthesiologist/Type of Anesthesia:  Anesthesiologist: Rufino Tolentino M.D./General    Surgical Staff:  Circulator: [place] Fields R.N.  Limb Garg: John José  Scrub Person: Tim Alvarado    Specimens removed if any:  * No specimens in log *    Estimated Blood Loss: 250    Findings: severe valgus, posterolateral tibial bone loss, insufficient MCL    Complications: none        7/22/2020 3:45 PM Temo Pires M.D.

## 2020-07-22 NOTE — OR NURSING
"1606 To PACU from OR via gurney, side rails up x 2 for safety, lungs clear bilaterally, scds on patient and machine operational, hemovac in place and serosanguinous drainage to tubing of hemovac and compressed. Prevena wound vac in place under R knee CDI dressing, On-Q pump in place and set to 6mL and locked. Ice pack placed to R knee and elevated with gurney and knee control locked straight. Immobilizer in place over all this to RLE. R toes pink/cool with <3 sec cap refill.   1615 Pt moaning and grimacing; pt reports pain to only R knee at 9/10 and not tolerabl \"it's different pain\". Patient reports pain to RLE is baseline 9/10 at home. Denies nausea. Shivering noted but pt denies feeling cold. Pt able to dorsiflex L foot and reports sensation of touch post RLE nerve block. Warm blanket to R foot; difficult to palpate pedal pulses bilaterally.   1630 Pt continues to remain awake and reports pain as 9/10 and not tolerable. Pt unable to rate tolerable pain goal for RN. Denies nausea and tolerating sips of water. Doppler used to locate R pedal pulse and marked for location.   1645 Xray completed. Pt reports no improvement with pain medication so far.   1700 Pt resting with eyes closed and breathing easy and unlabored.   1707 RN aroused patient when noted apnea x 3-5 seconds. RN educated patient that it is unsafe with his sleep apnea to continue to give narcotics and worsen apnea. Pt is agitated and upset that his pain \"is so bad, I knew this would happen\".   1715 Pt resting quietly with eyes closed and rhythmic breathing. Will monitor for apnea.   1725 Pt reports nausea without emesis and asking for urinal.   1735 Pt reports unable to void but appears to sleeping intermittently with rhythmic breathing. No further apnea noted. Pt reports nausea improved.   1740 Pt reports nausea continued; plan to give IV Haldol for comfort.   1755 Bladder scan 369mL. Pt reports nausea improved. Pt reports pain would improve \"if I " "could just get out of this bed\". Pt calm, cooperative, eyes close and resting quietly when unstimulated.   1810 Pt reports pain tolerable and denies nausea. Pt reports feeling of \"pressure\" to R knee. Meets criteria for transfer to [facility].   "

## 2020-07-22 NOTE — ANESTHESIA QCDR
2019 [facility] Clinical Data Registry (for Quality Improvement)     Postoperative nausea/vomiting risk protocol (Adult = 18 yrs and Pediatric 3-17 yrs)- (430 and 463)  General inhalation anesthetic (NOT TIVA) with PONV risk factors: Yes  Provision of anti-emetic therapy with at least 2 different classes of agents: Yes   Patient DID NOT receive anti-emetic therapy and reason is documented in Medical Record:  N/A    Multimodal Pain Management- (477)  Non-emergent surgery AND patient age >= 18: Yes  Use of Multimodal Pain Management, two or more drugs and/or interventions, NOT including systemic opioids: Yes  Exception: Documented allergy to multiple classes of analgesics: N/A    Smoking Abstinence (404)  Patient is current smoker (cigarette, pipe, e-cig, marijuanna): Yes  Elective Surgery: Yes  Abstinence instructions provided prior to day of surgery: Yes  Patient abstained from smoking on day of surgery: Yes    Pre-Op Beta-Blocker in Isolated CABG (44)  Isolated CABG AND patient age >= 18: No  Beta-blocker admin within 24 hours of surgical incision:   Exception:of medical reason(s) for not administering beta blocker within 24 hours prior to surgical incision (e.g., not  indicated,other medical reason):     PACU assessment of acute postoperative pain prior to Anesthesia Care End- Applies to Patients Age = 18- (ABG7)  Initial PACU pain score is which of the following: < 7/10  Patient unable to report pain score: N/A    Post-anesthetic transfer of care checklist/protocol to PACU/ICU- (426 and 427)  Upon conclusion of case, patient transferred to which of the following locations: PACU/Non-ICU  Use of transfer checklist/protocol: Yes  Exclusion: Service Performed in Patient Hospital Room (and thus did not require transfer): N/A  Unplanned admission to ICU related to anesthesia service up through end of PACU care- (MD51)  Unplanned admission to ICU (not initially anticipated at anesthesia start time): No

## 2020-07-22 NOTE — ANESTHESIA PREPROCEDURE EVALUATION
Relevant Problems   ANESTHESIA   (+) MONICA (obstructive sleep apnea)      CARDIAC   (+) Aortic ejection murmur   (+) Aortic root dilatation (HCC)   (+) Aortic stenosis, moderate   (+) Atrial fibrillation with rapid ventricular response (HCC)   (+) Essential hypertension      Other   (+) Chronic pain of right knee   (+) Cigarette nicotine dependence without complication   (+) Iron deficiency anemia, unspecified iron deficiency anemia type   (+) Obesity (BMI 35.0-39.9 without comorbidity) (HCC)   (+) Rheumatoid arthritis (HCC)       Physical Exam    Airway   Mallampati: II  TM distance: >3 FB  Neck ROM: full       Cardiovascular - normal exam  Rhythm: regular  Rate: normal  (-) murmur     Dental - normal exam           Pulmonary - normal exam  Breath sounds clear to auscultation     Abdominal   (+) obese     Neurological - normal exam                 Anesthesia Plan    ASA 3 (mod AS, MONICA)       Plan - general and peripheral nerve block     Peripheral nerve block will be post-op pain control  Airway plan will be LMA  (Adductor canal cath)      Induction: intravenous    Postoperative Plan: Postoperative administration of opioids is intended.    Pertinent diagnostic labs and testing reviewed    Informed Consent:    Anesthetic plan and risks discussed with patient.    Use of blood products discussed with: patient whom consented to blood products.

## 2020-07-22 NOTE — ANESTHESIA PROCEDURE NOTES
Peripheral Block    Date/Time: 7/22/2020 3:52 PM  Performed by: Rufino Tolentino M.D.  Authorized by: Rufino Tolentino M.D.     Patient Location:  OR  Start Time:  7/22/2020 3:52 PM  End Time:  7/22/2020 3:56 PM  Reason for Block: at surgeon's request and post-op pain management    patient identified, IV checked, site marked, risks and benefits discussed, surgical consent, monitors and equipment checked, pre-op evaluation and timeout performed    Patient Position:  Supine  Prep: ChloraPrep    Monitoring:  Heart rate, continuous pulse ox and cardiac monitor  Block Region:  Lower Extremity  Lower Extremity - Block Type:  Selective FEMORAL nerve block at the Adductor Canal (catheter)    Laterality:  Right  Procedures: ultrasound guided  Image captured, interpreted and electronically stored.  Local Infiltration:  Lidocaine  Strength:  1 %  Dose:  3 ml  Block Type:  Catheter  Needle Length:  100mm  Needle Gauge:  18 G  Needle Localization:  Ultrasound guidance  Injection Assessment:  Negative aspiration for heme, no paresthesia on injection, incremental injection and local visualized surrounding nerve on ultrasound  Evidence of intravascular injection: No    Catheter Securement:  E-cath device, tegaderm, tape and benzoin   US Guided Selective Femoral Nerve Catheter at Adductor Canal:   US probe placed at mid-thigh level on externally rotated leg and femur identified.  Probe directed medially until Sartorius Muscle (SM), Femoral Artery (FA) and Saphenous Nerve (SN) identified in Adductor Canal (AC).  Needle inserted anterolateral to probe in an in plane approach into a subsartorial perivascular perineural position.  After negative aspiration LA injected with ease and visualized spreading within the AC. E-cath advanced with good position in AC. Additional LA through cath with good spread. Secured with sterile dressing.

## 2020-07-22 NOTE — ANESTHESIA TIME REPORT
Anesthesia Start and Stop Event Times     Date Time Event    7/22/2020 1304 Ready for Procedure     1315 Anesthesia Start     1610 Anesthesia Stop        Responsible Staff  07/22/20    Name Role Begin End    Rufino Tolentino M.D. Anesth 1315 1610        Preop Diagnosis (Free Text):  Pre-op Diagnosis     PAIN IN RIGHT KNEE, UNILATERAL PRIMARY OSTEOARTHRITIS RIGHT KNEE        Preop Diagnosis (Codes):    Post op Diagnosis  Osteoarthritis of right knee, unspecified osteoarthritis type      Premium Reason  A. 3PM - 7AM    Comments:

## 2020-07-22 NOTE — ANESTHESIA PROCEDURE NOTES
Airway    Date/Time: 7/22/2020 1:20 PM  Performed by: Rufino Tolentino M.D.  Authorized by: Rufino Tolentino M.D.     Location:  OR  Urgency:  Elective  Difficult Airway: No    Indications for Airway Management:  Anesthesia      Spontaneous Ventilation: absent    Sedation Level:  Deep  Preoxygenated: Yes    Mask Difficulty Assessment:  1 - vent by mask  Final Airway Type:  Supraglottic airway  Final Supraglottic Airway:  Standard LMA    SGA Size:  4  Number of Attempts at Approach:  1

## 2020-07-22 NOTE — ANESTHESIA POSTPROCEDURE EVALUATION
Patient: Richard Hubbard II    Procedure Summary     Date:  07/22/20 Room / Location:   OR 06 / SURGERY [facility]    Anesthesia Start:  1315 Anesthesia Stop:  1610    Procedure:  ARTHROPLASTY, KNEE, TOTAL (Right Knee) Diagnosis:  (PAIN IN RIGHT KNEE, UNILATERAL PRIMARY OSTEOARTHRITIS RIGHT KNEE)    Surgeon:  Temo Pires M.D. Responsible Provider:  Rufino Tolentino M.D.    Anesthesia Type:  general, peripheral nerve block ASA Status:  3          Final Anesthesia Type: general, peripheral nerve block  Last vitals  BP   Blood Pressure: 114/82    Temp   36.4 °C (97.5 °F)    Pulse   Pulse: 64   Resp   18    SpO2   93 %      Anesthesia Post Evaluation    Patient location during evaluation: PACU  Patient participation: complete - patient participated  Level of consciousness: sleepy but conscious  Pain score: 6    Airway patency: patent  Anesthetic complications: no  Cardiovascular status: hemodynamically stable  Respiratory status: acceptable  Hydration status: euvolemic    PONV: none           Nurse Pain Score: 7 (NPRS)

## 2020-07-23 PROBLEM — D62 ACUTE BLOOD LOSS ANEMIA: Status: ACTIVE | Noted: 2020-07-23

## 2020-07-23 PROBLEM — I48.0 PAROXYSMAL ATRIAL FIBRILLATION (HCC): Status: ACTIVE | Noted: 2020-07-23

## 2020-07-23 LAB
ANION GAP SERPL CALC-SCNC: 8 MMOL/L (ref 7–16)
BUN SERPL-MCNC: 10 MG/DL (ref 8–22)
CALCIUM SERPL-MCNC: 7.5 MG/DL (ref 8.4–10.2)
CHLORIDE SERPL-SCNC: 104 MMOL/L (ref 96–112)
CO2 SERPL-SCNC: 23 MMOL/L (ref 20–33)
CREAT SERPL-MCNC: 0.65 MG/DL (ref 0.5–1.4)
ERYTHROCYTE [DISTWIDTH] IN BLOOD BY AUTOMATED COUNT: 42.8 FL (ref 35.9–50)
ERYTHROCYTE [DISTWIDTH] IN BLOOD BY AUTOMATED COUNT: 43.4 FL (ref 35.9–50)
FERRITIN SERPL-MCNC: 67.7 NG/ML (ref 22–322)
GLUCOSE SERPL-MCNC: 110 MG/DL (ref 65–99)
HCT VFR BLD AUTO: 22.8 % (ref 42–52)
HCT VFR BLD AUTO: 26.1 % (ref 42–52)
HGB BLD-MCNC: 7.2 G/DL (ref 14–18)
HGB BLD-MCNC: 7.2 G/DL (ref 14–18)
HGB BLD-MCNC: 8.3 G/DL (ref 14–18)
HGB RETIC QN AUTO: 31.7 PG/CELL (ref 29–35)
IMM RETICS NFR: 17.1 % (ref 9.3–17.4)
IRON SATN MFR SERPL: 10 % (ref 15–55)
IRON SERPL-MCNC: 22 UG/DL (ref 50–180)
MCH RBC QN AUTO: 28.7 PG (ref 27–33)
MCH RBC QN AUTO: 28.8 PG (ref 27–33)
MCHC RBC AUTO-ENTMCNC: 31.6 G/DL (ref 33.7–35.3)
MCHC RBC AUTO-ENTMCNC: 31.8 G/DL (ref 33.7–35.3)
MCV RBC AUTO: 90.6 FL (ref 81.4–97.8)
MCV RBC AUTO: 90.8 FL (ref 81.4–97.8)
PLATELET # BLD AUTO: 216 K/UL (ref 164–446)
PLATELET # BLD AUTO: 231 K/UL (ref 164–446)
PMV BLD AUTO: 9.2 FL (ref 9–12.9)
PMV BLD AUTO: 9.5 FL (ref 9–12.9)
POTASSIUM SERPL-SCNC: 4.5 MMOL/L (ref 3.6–5.5)
RBC # BLD AUTO: 2.51 M/UL (ref 4.7–6.1)
RBC # BLD AUTO: 2.88 M/UL (ref 4.7–6.1)
RETICS # AUTO: 0.05 M/UL (ref 0.04–0.06)
RETICS/RBC NFR: 1.7 % (ref 0.8–2.1)
SODIUM SERPL-SCNC: 135 MMOL/L (ref 135–145)
TIBC SERPL-MCNC: 229 UG/DL (ref 250–450)
UIBC SERPL-MCNC: 207 UG/DL (ref 110–370)
WBC # BLD AUTO: 10.8 K/UL (ref 4.8–10.8)
WBC # BLD AUTO: 9.2 K/UL (ref 4.8–10.8)

## 2020-07-23 PROCEDURE — 99407 BEHAV CHNG SMOKING > 10 MIN: CPT

## 2020-07-23 PROCEDURE — 80048 BASIC METABOLIC PNL TOTAL CA: CPT

## 2020-07-23 PROCEDURE — 99253 IP/OBS CNSLTJ NEW/EST LOW 45: CPT | Performed by: FAMILY MEDICINE

## 2020-07-23 PROCEDURE — 85018 HEMOGLOBIN: CPT

## 2020-07-23 PROCEDURE — 700102 HCHG RX REV CODE 250 W/ 637 OVERRIDE(OP): Performed by: ORTHOPAEDIC SURGERY

## 2020-07-23 PROCEDURE — 97165 OT EVAL LOW COMPLEX 30 MIN: CPT

## 2020-07-23 PROCEDURE — 700101 HCHG RX REV CODE 250: Performed by: ORTHOPAEDIC SURGERY

## 2020-07-23 PROCEDURE — 700112 HCHG RX REV CODE 229: Performed by: ORTHOPAEDIC SURGERY

## 2020-07-23 PROCEDURE — 82728 ASSAY OF FERRITIN: CPT

## 2020-07-23 PROCEDURE — A9270 NON-COVERED ITEM OR SERVICE: HCPCS | Performed by: ORTHOPAEDIC SURGERY

## 2020-07-23 PROCEDURE — 83540 ASSAY OF IRON: CPT

## 2020-07-23 PROCEDURE — 700105 HCHG RX REV CODE 258: Performed by: ORTHOPAEDIC SURGERY

## 2020-07-23 PROCEDURE — 97535 SELF CARE MNGMENT TRAINING: CPT

## 2020-07-23 PROCEDURE — 85027 COMPLETE CBC AUTOMATED: CPT

## 2020-07-23 PROCEDURE — 700111 HCHG RX REV CODE 636 W/ 250 OVERRIDE (IP): Performed by: FAMILY MEDICINE

## 2020-07-23 PROCEDURE — 83550 IRON BINDING TEST: CPT

## 2020-07-23 PROCEDURE — C9113 INJ PANTOPRAZOLE SODIUM, VIA: HCPCS | Performed by: FAMILY MEDICINE

## 2020-07-23 PROCEDURE — 36415 COLL VENOUS BLD VENIPUNCTURE: CPT

## 2020-07-23 PROCEDURE — 97163 PT EVAL HIGH COMPLEX 45 MIN: CPT

## 2020-07-23 PROCEDURE — 770001 HCHG ROOM/CARE - MED/SURG/GYN PRIV*

## 2020-07-23 PROCEDURE — 85046 RETICYTE/HGB CONCENTRATE: CPT

## 2020-07-23 PROCEDURE — 700111 HCHG RX REV CODE 636 W/ 250 OVERRIDE (IP): Performed by: ORTHOPAEDIC SURGERY

## 2020-07-23 RX ORDER — HYDROMORPHONE HYDROCHLORIDE 1 MG/ML
1-2 INJECTION, SOLUTION INTRAMUSCULAR; INTRAVENOUS; SUBCUTANEOUS
Status: DISCONTINUED | OUTPATIENT
Start: 2020-07-23 | End: 2020-07-24

## 2020-07-23 RX ORDER — MORPHINE SULFATE 15 MG/1
15 TABLET ORAL EVERY 6 HOURS
Status: DISCONTINUED | OUTPATIENT
Start: 2020-07-23 | End: 2020-07-23

## 2020-07-23 RX ORDER — PANTOPRAZOLE SODIUM 40 MG/10ML
40 INJECTION, POWDER, LYOPHILIZED, FOR SOLUTION INTRAVENOUS 2 TIMES DAILY
Status: DISCONTINUED | OUTPATIENT
Start: 2020-07-23 | End: 2020-07-25

## 2020-07-23 RX ORDER — MORPHINE SULFATE 15 MG/1
15 TABLET ORAL EVERY 6 HOURS
Status: DISCONTINUED | OUTPATIENT
Start: 2020-07-23 | End: 2020-07-27 | Stop reason: HOSPADM

## 2020-07-23 RX ADMIN — PANTOPRAZOLE SODIUM 40 MG: 40 INJECTION, POWDER, LYOPHILIZED, FOR SOLUTION INTRAVENOUS at 10:48

## 2020-07-23 RX ADMIN — PREGABALIN 150 MG: 75 CAPSULE ORAL at 11:48

## 2020-07-23 RX ADMIN — DEXAMETHASONE SODIUM PHOSPHATE 6 MG: 4 INJECTION, SOLUTION INTRA-ARTICULAR; INTRALESIONAL; INTRAMUSCULAR; INTRAVENOUS; SOFT TISSUE at 05:21

## 2020-07-23 RX ADMIN — HYDROMORPHONE HYDROCHLORIDE 2 MG: 1 INJECTION, SOLUTION INTRAMUSCULAR; INTRAVENOUS; SUBCUTANEOUS at 11:54

## 2020-07-23 RX ADMIN — HYDROMORPHONE HYDROCHLORIDE 2 MG: 1 INJECTION, SOLUTION INTRAMUSCULAR; INTRAVENOUS; SUBCUTANEOUS at 22:55

## 2020-07-23 RX ADMIN — STANDARDIZED SENNA CONCENTRATE AND DOCUSATE SODIUM 1 TABLET: 8.6; 5 TABLET, FILM COATED ORAL at 21:48

## 2020-07-23 RX ADMIN — HYDROMORPHONE HYDROCHLORIDE 2 MG: 1 INJECTION, SOLUTION INTRAMUSCULAR; INTRAVENOUS; SUBCUTANEOUS at 19:57

## 2020-07-23 RX ADMIN — MORPHINE SULFATE 15 MG: 15 TABLET ORAL at 08:55

## 2020-07-23 RX ADMIN — ASPIRIN 81 MG: 81 TABLET, COATED ORAL at 05:14

## 2020-07-23 RX ADMIN — MORPHINE SULFATE 15 MG: 15 TABLET ORAL at 10:57

## 2020-07-23 RX ADMIN — ACETAMINOPHEN 1000 MG: 500 TABLET, FILM COATED ORAL at 00:05

## 2020-07-23 RX ADMIN — OMEPRAZOLE 40 MG: 20 CAPSULE, DELAYED RELEASE ORAL at 05:14

## 2020-07-23 RX ADMIN — PANTOPRAZOLE SODIUM 40 MG: 40 INJECTION, POWDER, LYOPHILIZED, FOR SOLUTION INTRAVENOUS at 17:33

## 2020-07-23 RX ADMIN — ACETAMINOPHEN 1000 MG: 500 TABLET, FILM COATED ORAL at 11:48

## 2020-07-23 RX ADMIN — HYDROMORPHONE HYDROCHLORIDE 1 MG: 1 INJECTION, SOLUTION INTRAMUSCULAR; INTRAVENOUS; SUBCUTANEOUS at 07:32

## 2020-07-23 RX ADMIN — PREGABALIN 150 MG: 75 CAPSULE ORAL at 17:34

## 2020-07-23 RX ADMIN — MORPHINE SULFATE 15 MG: 15 TABLET ORAL at 14:40

## 2020-07-23 RX ADMIN — ACETAMINOPHEN 1000 MG: 500 TABLET, FILM COATED ORAL at 05:14

## 2020-07-23 RX ADMIN — PREGABALIN 150 MG: 75 CAPSULE ORAL at 05:14

## 2020-07-23 RX ADMIN — HYDROMORPHONE HYDROCHLORIDE 2 MG: 1 INJECTION, SOLUTION INTRAMUSCULAR; INTRAVENOUS; SUBCUTANEOUS at 14:40

## 2020-07-23 RX ADMIN — POLYETHYLENE GLYCOL 3350 1 PACKET: 17 POWDER, FOR SOLUTION ORAL at 07:46

## 2020-07-23 RX ADMIN — DEXAMETHASONE SODIUM PHOSPHATE 4 MG: 4 INJECTION, SOLUTION INTRA-ARTICULAR; INTRALESIONAL; INTRAMUSCULAR; INTRAVENOUS; SOFT TISSUE at 05:16

## 2020-07-23 RX ADMIN — HYDROMORPHONE HYDROCHLORIDE 1 MG: 1 INJECTION, SOLUTION INTRAMUSCULAR; INTRAVENOUS; SUBCUTANEOUS at 08:49

## 2020-07-23 RX ADMIN — MAGNESIUM HYDROXIDE 30 ML: 400 SUSPENSION ORAL at 10:57

## 2020-07-23 RX ADMIN — DOCUSATE SODIUM 100 MG: 100 CAPSULE, LIQUID FILLED ORAL at 05:14

## 2020-07-23 RX ADMIN — VANCOMYCIN HYDROCHLORIDE 1500 MG: 500 INJECTION, POWDER, LYOPHILIZED, FOR SOLUTION INTRAVENOUS at 00:20

## 2020-07-23 RX ADMIN — MORPHINE SULFATE 15 MG: 15 TABLET ORAL at 15:20

## 2020-07-23 RX ADMIN — HYDROMORPHONE HYDROCHLORIDE 2 MG: 1 INJECTION, SOLUTION INTRAMUSCULAR; INTRAVENOUS; SUBCUTANEOUS at 17:32

## 2020-07-23 RX ADMIN — MORPHINE SULFATE 15 MG: 15 TABLET ORAL at 01:59

## 2020-07-23 RX ADMIN — NICOTINE 7 MG: 7 PATCH TRANSDERMAL at 17:42

## 2020-07-23 RX ADMIN — CEFAZOLIN 2 G: 10 INJECTION, POWDER, FOR SOLUTION INTRAVENOUS; PARENTERAL at 05:17

## 2020-07-23 RX ADMIN — DOCUSATE SODIUM 100 MG: 100 CAPSULE, LIQUID FILLED ORAL at 17:36

## 2020-07-23 RX ADMIN — MORPHINE SULFATE 15 MG: 15 TABLET ORAL at 21:49

## 2020-07-23 ASSESSMENT — COGNITIVE AND FUNCTIONAL STATUS - GENERAL
DAILY ACTIVITIY SCORE: 20
STANDING UP FROM CHAIR USING ARMS: A LITTLE
DRESSING REGULAR LOWER BODY CLOTHING: A LITTLE
SUGGESTED CMS G CODE MODIFIER DAILY ACTIVITY: CK
SUGGESTED CMS G CODE MODIFIER DAILY ACTIVITY: CJ
TOILETING: A LITTLE
WALKING IN HOSPITAL ROOM: A LITTLE
MOVING TO AND FROM BED TO CHAIR: A LITTLE
MOBILITY SCORE: 19
HELP NEEDED FOR BATHING: A LITTLE
PERSONAL GROOMING: A LITTLE
HELP NEEDED FOR BATHING: A LITTLE
MOVING FROM LYING ON BACK TO SITTING ON SIDE OF FLAT BED: A LITTLE
DRESSING REGULAR LOWER BODY CLOTHING: A LOT
DAILY ACTIVITIY SCORE: 19
CLIMB 3 TO 5 STEPS WITH RAILING: A LITTLE
TOILETING: A LITTLE
SUGGESTED CMS G CODE MODIFIER MOBILITY: CK
DRESSING REGULAR UPPER BODY CLOTHING: A LITTLE

## 2020-07-23 ASSESSMENT — LIFESTYLE VARIABLES
CONSUMPTION TOTAL: NEGATIVE
EVER HAD A DRINK FIRST THING IN THE MORNING TO STEADY YOUR NERVES TO GET RID OF A HANGOVER: NO
HAVE YOU EVER FELT YOU SHOULD CUT DOWN ON YOUR DRINKING: NO
TOTAL SCORE: 0
EVER FELT BAD OR GUILTY ABOUT YOUR DRINKING: NO
HAVE PEOPLE ANNOYED YOU BY CRITICIZING YOUR DRINKING: NO
TOTAL SCORE: 0
ON A TYPICAL DAY WHEN YOU DRINK ALCOHOL HOW MANY DRINKS DO YOU HAVE: 0
SUBSTANCE_ABUSE: 0
TOTAL SCORE: 0
AVERAGE NUMBER OF DAYS PER WEEK YOU HAVE A DRINK CONTAINING ALCOHOL: 0
ALCOHOL_USE: NO
HOW MANY TIMES IN THE PAST YEAR HAVE YOU HAD 5 OR MORE DRINKS IN A DAY: 0

## 2020-07-23 ASSESSMENT — ENCOUNTER SYMPTOMS
SPUTUM PRODUCTION: 0
PHOTOPHOBIA: 0
BLOOD IN STOOL: 0
COUGH: 0
HEADACHES: 0
PALPITATIONS: 0
HEARTBURN: 0
VOMITING: 0
TINGLING: 0
DEPRESSION: 0
CHILLS: 0
BLURRED VISION: 0
MYALGIAS: 1
DIZZINESS: 0
HEMOPTYSIS: 0
ORTHOPNEA: 0
NAUSEA: 0
FEVER: 0
DOUBLE VISION: 0

## 2020-07-23 ASSESSMENT — GAIT ASSESSMENTS
DISTANCE (FEET): 40
DEVIATION: ANTALGIC
ASSISTIVE DEVICE: FRONT WHEEL WALKER
GAIT LEVEL OF ASSIST: MINIMAL ASSIST

## 2020-07-23 ASSESSMENT — ACTIVITIES OF DAILY LIVING (ADL): TOILETING: INDEPENDENT

## 2020-07-23 NOTE — PROGRESS NOTES
2100 - Pt voided 250 mL. Ate sandwich, tolerated well.    0000 - Pt ambulated 50 ft in hallway with front wheel walker. Tires quickly and has generalized weakness. No buckling of the knee which pt reports he had before surgery.      0500 - Hgb 7.2. MD notified and recommended to page Dr. Pires's office during working hours as this doesn't require transfusion right away.     Previna had 0mL output.  Hemovac had 10mL output.   No other events overnight.

## 2020-07-23 NOTE — DISCHARGE PLANNING
Care Transition Team Assessment  The information provided for this assessment was provided by chart review.    Pt evaluated by PT and recommending SNF vs HH depending on improvement. LSW to continue to follow to assist with d/c needs.     Information Source  Orientation : Oriented x 4  Information Given By: Other (Comments)  Who is responsible for making decisions for patient? : Patient    Elopement Risk  Legal Hold: No  Ambulatory or Self Mobile in Wheelchair: Yes  Disoriented: No  Psychiatric Symptoms: None  History of Wandering: No  Elopement this Admit: No  Vocalizing Wanting to Leave: No  Displays Behaviors, Body Language Wanting to Leave: No-Not at Risk for Elopement  Elopement Risk: Not at Risk for Elopement    Interdisciplinary Discharge Planning  Lives with - Patient's Self Care Capacity: Spouse  Patient or legal guardian wants to designate a caregiver (see row info): No  Housing / Facility: [address] [facility] [place] [facility]""  Prior Services: None    Discharge Preparedness  What is your plan after discharge?: Uncertain - pending medical team collaboration  What are your discharge supports?: Spouse  Prior Functional Level: Independent with Activities of Daily Living, Independent with Medication Management    Functional Assesment  Prior Functional Level: Independent with Activities of Daily Living, Independent with Medication Management    Finances  Financial Barriers to Discharge: No  Prescription Coverage: Yes    Domestic Abuse  Have you ever been the victim of abuse or violence?: No  Physical Abuse or Sexual Abuse: No  Verbal Abuse or Emotional Abuse: No  Possible Abuse Reported to:: Not Applicable    Discharge Risks or Barriers  Discharge risks or barriers?: No    Anticipated Discharge Information  Anticipated discharge disposition: Discharge needs currently unknown, HHC, SNF

## 2020-07-23 NOTE — PROGRESS NOTES
"S: Feels OK, a lot of pelvic pain from ischium fracture.  R adi feels OK    O: Knee incision clean and dry.  Foot NVI.  Significant anemia and low BP.    Blood pressure (!) 86/43, pulse (!) 55, temperature 36.4 °C (97.5 °F), temperature source Oral, resp. rate 16, height 1.803 m (5' 11\"), weight 100.2 kg (220 lb 14.4 oz), SpO2 92 %.    Recent Labs     07/23/20  0330   WBC 9.2   RBC 2.51*   HEMOGLOBIN 7.2*   HEMATOCRIT 22.8*   MCV 90.8   MCH 28.7   MCHC 31.6*   RDW 42.8   PLATELETCT 216   MPV 9.5         Intake/Output Summary (Last 24 hours) at 7/23/2020 0700  Last data filed at 7/23/2020 0600  Gross per 24 hour   Intake 2750 ml   Output 1715 ml   Net 1035 ml         A:  Patient Active Problem List    Diagnosis Date Noted   • Atrial fibrillation with rapid ventricular response ([facility]) 11/05/2019     Priority: High   • Essential hypertension 09/13/2019     Priority: Low   • Rheumatoid arthritis ([facility]) 09/08/2015     Priority: Low   • Chronic pain of right knee 09/08/2015     Priority: Low   • Cigarette nicotine dependence without complication 07/21/2020   • Positive occult stool blood test 09/17/2019   • Aortic root dilatation ([facility]) 09/13/2019   • Elevated alkaline phosphatase level 09/13/2019   • Aortic stenosis, moderate 09/12/2019   • Iron deficiency anemia, unspecified iron deficiency anemia type 08/15/2019   • Localized edema 08/15/2019   • Aortic ejection murmur 03/08/2019   • Obesity (BMI 35.0-39.9 without comorbidity) ([facility]) 08/09/2018   • Pagophagia 11/13/2017   • Adult ADHD 09/08/2010   • MONICA (obstructive sleep apnea) 03/09/2010   • Mood disorder ([facility]) 05/21/2009   • Anxiety disorder 05/21/2009   • Eczema 05/21/2009       S/P R TKA/hinge with postop blood anemia possible, also potential other bleeding site.  Hx of GI bleed.    PLAN: Mobilize as able, recheck Hgb, Hospitalist consult.  "

## 2020-07-23 NOTE — PROGRESS NOTES
Received report and assumed care.  Pt AOx4. Reporting 6/10 right knee and pelvic pain.  CMS in RLE intact except pt reporting some numbness in entire length of extremity since arrival to unit. Is able to feel touch.  Denies nausea or SOB.  Previna has 30 mL output.   Pt voided 250 mL clear cassandra urine. Plan of care discussed with pt that he still has to eat and ambulate. Pt verbalized understanding.  No other needs at this time. Bed locked in lowest position. Call light within reach. Ancef infusing.

## 2020-07-23 NOTE — ASSESSMENT & PLAN NOTE
Hemoglobin and 7/16/2020 was 11.5 which dropped to 7.2 on 7/23/2020.  Had right knee arthroplasty on 7/22/2020.  History of peptic ulcer disease in the past with EGD done as an outpatient in August 2019 which showed PUD with no active bleeding.  This acute anemia could be from surgery or dilutional with less likely GI loss with normal BUN and no bowel movement last 4 days and no melena.  I discussed the case with GI Dr. Dunn.  We will start IV PPI.  Will monitor H&H closely.  Transfuse with PRBC if hemoglobin drops below 7 g/dL.  If continues to be stable, outpatient work-up will be recommended.  However, if H&H continues to trend down, then full inpatient GI work-up will be granted.  Occult stool blood test ordered.  7/24: H&H trended down slightly.  Discussed with GI Dr. Huffman who still does not think that patient have a GI bleed at this point and who recommended 1 units of PRBC and iron infusion and to follow-up with GI as an outpatient.  7/25: H&H has been stable status post 1 unit of PRBC and IV iron infusion.  Discontinue every 8 hours repeat hemoglobin.  7/26: No concern for GI blood loss.  H&H has been stable.

## 2020-07-23 NOTE — CONSULTS
Hospital Medicine Consultation    Date of Service  7/23/2020    Referring Physician  Temo Pires M.D.    Consulting Physician  Shyann Pelaez M.D.    Reason for Consultation  Anemia    History of Presenting Illness  45 y.o. male who has complicated past medical history including severe rheumatoid arthritis currently on Xeljanz, history of paroxysmal A. fib on aspirin, and history of peptic ulcer disease has been admitted by orthopedic surgery for elective right knee arthroplasty due to severe osteoarthritic changes and deformity.  Patient had surgeries done on this joint in the past but the joint now becomes very stiff and deformed.  Patient had arthroplasty done on right knee joint on 7/22/2020.  His hemoglobin noted to drop to 7.2 g/dL on 7/22/2020 from 11.5 g/dL on 7/16/2020.  Hospital medicine was consulted for this reason.  In between patient denies any hematochezia or melena.  As mentioned above he has a history of upper GI bleed with endoscopy showing peptic ulcer disease with no active bleeding consistent with NSAIDs gastritis at that time.  Patient denies using any NSAIDs since 7/16/2020.  He only takes baby aspirin for paroxysmal A. fib.  He stated that he did not have a bowel movement over the last 4 days.  Had a blood transfusion in January 2020.  BUN was within normal limits.  Currently hemodynamically stable with occasionally soft blood pressure.  Apparently patient had a GI work-up done in [place] long time ago.  Have not had colonoscopy recently.    Review of Systems  Review of Systems   Constitutional: Positive for malaise/fatigue. Negative for chills and fever.   HENT: Negative for ear pain, hearing loss and tinnitus.    Eyes: Negative for blurred vision, double vision and photophobia.   Respiratory: Negative for cough, hemoptysis and sputum production.    Cardiovascular: Negative for chest pain, palpitations and orthopnea.   Gastrointestinal: Negative for blood in stool, heartburn, melena,  nausea and vomiting.   Genitourinary: Negative for dysuria and urgency.   Musculoskeletal: Positive for joint pain and myalgias.   Skin: Negative for rash.   Neurological: Negative for dizziness, tingling and headaches.   Psychiatric/Behavioral: Negative for depression, substance abuse and suicidal ideas.       Past Medical History   has a past medical history of ADD (attention deficit disorder), Alcohol abuse, Allergic rhinitis (5/21/2009), Anemia (09/2019), Anxiety (5/21/2009), Arrhythmia, Back pain (5/21/2009), Bipolar disorder ([facility]), Bleeding ulcer (5/21/2009), Bleeding ulcer (5/21/2009), Depression (5/21/2009), Eczema (5/21/2009), GERD (gastroesophageal reflux disease), Heart murmur, Hemorrhagic disorder ([facility]), History of bleeding ulcers (2/12/2015), Insomnia (5/21/2009), Migraine (5/21/2009), Obesity, morbid ([facility]) (5/21/2009), Osteomyelitis ([facility]) (10/7/2019), Pain (09/2019), Pubic ramus fracture ([facility]) (9/28/2019), Rheumatoid arteritis ([facility]) (09/2019), Sleep apnea (5/21/2009), and Snoring. He also has no past medical history of Hyperlipidemia.    Surgical History   has a past surgical history that includes gastric bypass laparoscopic (2000); cholecystectomy (2000); irrigation & debridement ortho (Left, 10/9/2019); tendon transfer (Right, 1/22/2020); other surgical procedure; and other surgical procedure (2019).    Family History  family history includes Arthritis in his mother; Cancer in his father and maternal grandmother; Depression in his mother; Heart Disease in his maternal grandmother; Hypertension in his mother; Psychiatric Illness in an other family member; Schizophrenia in his father; Stroke in his maternal aunt.    Social History   reports that he has been smoking cigarettes and cigars. He has a 0.75 pack-year smoking history. He has never used smokeless tobacco. He reports that he does not drink alcohol or use drugs.    Medications  Prior to Admission Medications   Prescriptions Last Dose Informant  "Patient Reported? Taking?   Naloxone (NARCAN) 4 MG/0.1ML Liquid n/a  No No   Sig: One spray in one nostril for overdose and call 911.   Patient taking differently: Spray 1 Spray in nose as needed. One spray in one nostril for overdose and call 911.   acetaminophen (TYLENOL) 500 MG Tab 7/21/2020 at Unknown time  Yes No   Sig: Take 500-1,000 mg by mouth every 6 hours as needed.   losartan (COZAAR) 25 MG Tab 7/18/2020 Patient No No   Sig: Take 1 Tab by mouth every day.   morphine (MS IR) 15 MG tablet 7/22/2020 at Unknown time  Yes No   Sig: Take 15 mg by mouth every 6 hours as needed.   mupirocin (BACTROBAN) 2 % Ointment 7/22/2020 at Unknown time  Yes No   nicotine (NICODERM) 7 MG/24HR PATCH 24 HR n/a  No No   Sig: Apply 1 Patch to skin as directed every 24 hours.   omeprazole (PRILOSEC) 40 MG delayed-release capsule 7/22/2020 at Unknown time Patient No No   Sig: Take 1 Cap by mouth every day.   pregabalin (LYRICA) 150 MG Cap 7/22/2020 at Unknown time  Yes No   Sig: Take 150 mg by mouth 3 times a day.      Facility-Administered Medications: None       Allergies  Allergies   Allergen Reactions   • Benadryl [Altaryl]      \"I get agitated\"   • Nsaids      Bleeding, \"I had a bleeding ulcer\"   • Phenergan [Promethazine Hcl]      \"I get very agitated\"   • Penicillins      \"Had some dizziness\"  Tolerated Unasyn 10/2019       Physical Exam  Temp:  [36 °C (96.8 °F)-36.6 °C (97.9 °F)] 36.6 °C (97.9 °F)  Pulse:  [55-96] 96  Resp:  [14-18] 16  BP: ()/(43-90) 103/57  SpO2:  [91 %-100 %] 100 %    Physical Exam  Constitutional:       General: He is not in acute distress.     Appearance: He is obese.   HENT:      Head: Normocephalic and atraumatic.      Mouth/Throat:      Mouth: Mucous membranes are moist.   Eyes:      Extraocular Movements: Extraocular movements intact.      Pupils: Pupils are equal, round, and reactive to light.   Cardiovascular:      Rate and Rhythm: Normal rate and regular rhythm.      Heart sounds: No " friction rub. No gallop.    Pulmonary:      Effort: Pulmonary effort is normal. No respiratory distress.      Breath sounds: No stridor.   Abdominal:      General: There is no distension.      Palpations: Abdomen is soft.      Tenderness: There is no abdominal tenderness.   Musculoskeletal:      Comments: Right lower extremity in brace with ice therapy  Neurovascular bundle on right lower extremity intact.   Skin:     Coloration: Skin is pale. Skin is not jaundiced.   Neurological:      General: No focal deficit present.      Mental Status: He is alert and oriented to person, place, and time.   Psychiatric:         Mood and Affect: Mood normal.         Behavior: Behavior normal.         Fluids  Date 07/23/20 0700 - 07/24/20 0659   Shift 4847-0483 2467-5340 7854-1493 24 Hour Total   INTAKE   P.O. 240   240   Shift Total 240   240   OUTPUT   Urine 300   300   Shift Total 300   300   Weight (kg) 100.2 100.2 100.2 100.2       Laboratory  Recent Labs     07/23/20  0330 07/23/20  1011   WBC 9.2 10.8   RBC 2.51* 2.88*   HEMOGLOBIN 7.2* 8.3*   HEMATOCRIT 22.8* 26.1*   MCV 90.8 90.6   MCH 28.7 28.8   MCHC 31.6* 31.8*   RDW 42.8 43.4   PLATELETCT 216 231   MPV 9.5 9.2     Recent Labs     07/23/20  0330   SODIUM 135   POTASSIUM 4.5   CHLORIDE 104   CO2 23   GLUCOSE 110*   BUN 10   CREATININE 0.65   CALCIUM 7.5*                     Imaging  DX-KNEE 2- RIGHT   Final Result      Post right knee tricompartment constrained arthroplasty.          Assessment/Plan  * Acute blood loss anemia- (present on admission)  Assessment & Plan  Hemoglobin and 7/16/2020 was 11.5 which dropped to 7.2 on 7/23/2020.  Had right knee arthroplasty on 7/22/2020.  History of peptic ulcer disease in the past with EGD done as an outpatient in August 2019 which showed PUD with no active bleeding.  This acute anemia could be from surgery or dilutional with less likely GI loss with normal BUN and no bowel movement last 4 days and no melena.  I discussed the  case with GI Dr. Dunn.  We will start IV PPI.  Will monitor H&H closely.  Transfuse with PRBC if hemoglobin drops below 7 g/dL.  If continues to be stable, outpatient work-up will be recommended.  However, if H&H continues to trend down, then full inpatient GI work-up will be granted.  Occult stool blood test ordered.    Paroxysmal atrial fibrillation (HCC)- (present on admission)  Assessment & Plan  History of with CHADVASC score 0  On baby aspirin at home which will be held for now due to concern of GI bleed.    Rheumatoid arthritis (HCC)- (present on admission)  Assessment & Plan  On Xeljanz fusion as an outpatient  Severe with significant right knee osteoarthritic degenerative changes and deformity.  Status post arthroplasty done by orthopedic surgery on 7/22/2020.  Postop care per orthopedic surgery.  Follow-up with rheumatology as an outpatient.

## 2020-07-23 NOTE — ASSESSMENT & PLAN NOTE
History of with CHADVASC score 0  On baby aspirin at home which will be held for now due to concern of GI bleed/anemia.  7/26: H&H has been stable.  We will continue aspirin.

## 2020-07-23 NOTE — THERAPY
Occupational Therapy   Initial Evaluation     Patient Name: Richard Hubbard II  Age:  45 y.o., Sex:  male  Medical Record #: [id number]  Today's Date: 7/23/2020     Precautions  Precautions: (P) Immobilizer Right Lower Extremity, Weight Bearing As Tolerated Right Lower Extremity, Fall Risk    Assessment  Patient is 45 y.o. male, admit for a R TKA. Pt has a history significant for anemia, hypotension, pelvic fx. Pt presents with dizziness with activity has c/o pain with mobility and ADLS, is at the Supervised to Mod A level for ADLS and Min A for functional transfers with decreased activity tolerance. Pt is not at baseline, will need continued OT to increase functional independence and safety.    Plan    Recommend Occupational Therapy 5 times per week until therapy goals are met for the following treatments:  Neuro Re-Education / Balance, Self Care/Activities of Daily Living, Therapeutic Activities and Therapeutic Exercises.    Discharge recommendations:  Recommend post-acute placement for additional occupational therapy services prior to discharge home, vs home with home health - dependent upon progress with therapy.         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   Prior Level of ADL Function   Self Feeding Independent   Grooming / Hygiene Independent   Bathing Independent   Dressing Independent   Toileting Independent   Comments with equipment   Prior Level of IADL Function   Medication Management Independent   Laundry Independent   Kitchen Mobility Independent   Finances Independent   Home Management  Requires Assist   Shopping Independent   Prior Level Of Mobility Independent With Device in Community   Driving / Transportation Driving Independent   Occupation (Pre-Hospital Vocational) Retired Due To Disability   Leisure Interests Family   History of Falls   History of Falls No   Precautions   Precautions Immobilizer Right Lower Extremity;Weight Bearing As Tolerated Right Lower Extremity;Fall Risk   Pain 0 - 10 Group   Therapist Pain Assessment During Activity;Nurse Notified   Active ROM Upper Body   Active ROM Upper Body  WDL   Dominant Hand Right   Strength Upper Body   Upper Body Strength  WDL   Upper Body Muscle Tone   Upper Body Muscle Tone  WDL   Coordination Upper Body   Coordination X   Comments Recent R hand surgery   Balance Assessment   Sitting Balance (Static) Good   Sitting Balance (Dynamic) Fair +   Standing Balance (Static) Fair   Standing Balance (Dynamic) Fair -   Weight Shift Sitting Good   Weight Shift Standing Fair   Comments Dizziness with standing/ sitting upright    Bed Mobility    Supine to Sit Supervised   Sit to Supine Supervised   Scooting Supervised   ADL Assessment   Eating Independent   Grooming Supervision;Seated   Upper Body Dressing Supervision   Lower Body Dressing Moderate Assist   Functional Mobility   Sit to Stand Minimal Assist   Bed, Chair, Wheelchair Transfer Minimal Assist   Transfer Method Stand Step   Mobility FWW   Edema / Skin Assessment   Edema / Skin  X   Comments Bilateral LE   Activity Tolerance   Sitting in Chair >1 hr   Sitting Edge of Bed 15 min   Standing 3 min   Patient / Family Goals   Patient / Family Goal #1 Home after rehab   Short Term Goals   Short Term Goal # 1 Pt will be able to complete FB dressing with Supervision   Short Term Goal # 2 Pt will be able to complete functional transfers with Supervision   Short Term Goal # 3 Pt will be able to complete 10 min standing sinkside ADLS with Supervision, good activity tolerance and safety.

## 2020-07-23 NOTE — ASSESSMENT & PLAN NOTE
On Xeljanz fusion as an outpatient  Severe with significant right knee osteoarthritic degenerative changes and deformity.  Status post arthroplasty done by orthopedic surgery on 7/22/2020.  Postop care per orthopedic surgery.  Follow-up with rheumatology as an outpatient.  Pending physiatry evaluation for rehab.

## 2020-07-23 NOTE — THERAPY
Physical Therapy   Initial Evaluation     Patient Name: Richard Hubbard II  Age:  45 y.o., Sex:  male  Medical Record #: [id number]  Today's Date: 7/23/2020     Precautions: (P) Weight Bearing As Tolerated Right Lower Extremity immobiliser on and no ROM     07/23/20 1100   Prior Living Situation   Prior Services None   Housing / Facility 1 Story House   Steps Into Home 2   Steps In Home 0   Equipment Owned Front-Wheel Walker   Lives with - Patient's Self Care Capacity Spouse   Prior Level of Functional Mobility   Bed Mobility Independent   Transfer Status Independent   Ambulation Independent   Distance Ambulation (Feet)   (limited community)   Stairs Independent   Balance Assessment   Sitting Balance (Static) Good   Sitting Balance (Dynamic) Fair +   Standing Balance (Static) Fair   Standing Balance (Dynamic) Fair   Weight Shift Sitting Good   Weight Shift Standing Fair   Gait Analysis   Gait Level Of Assist Minimal Assist   Assistive Device Front Wheel Walker   Distance (Feet) 40   # of Times Distance was Traveled 1   Deviation Antalgic   # of Stairs Climbed 0   Weight Bearing Status WBAT R   Functional Mobility   Sit to Stand Minimal Assist   Bed, Chair, Wheelchair Transfer Minimal Assist   Transfer Method Stand Step   Patient / Family Goals    Patient / Family Goal #1 not stated   Short Term Goals    Short Term Goal # 1 Pt to be I with transfers in ^ V so can D/C home   Short Term Goal # 2 Pt to be I with ambulation x 200 feet in 6 V so can D/C home   Short Term Goal # 3 Pt to be S x 2 stairs in 6 V so can get into house   Problem List    Problems Impaired Transfers;Impaired Ambulation;Functional ROM Deficit;Functional Strength Deficit   Anticipated Discharge Equipment   DC Equipment Unable To Determine At This Time       Assessment  Patient is 45 y.o. male with a diagnosis of  R TKR Pt has a history of RA and recent pelvic Fx,he lives at home with wife.Acute PT needed to improve transfers,ambulation and  stairs    Plan    Recommend Physical Therapy daily until therapy goals are met for the following treatments:  Gait Training, Stair Training, Therapeutic Activities and Therapeutic Exercises    Discharge recommendations:  SNF vrs Home depending on improvement

## 2020-07-23 NOTE — RESPIRATORY CARE
" COPD EDUCATION by COPD CLINICAL EDUCATOR  7/23/2020 at 10:49 AM by Asmita Leger, RRT     Patient reviewed by COPD education team. Patient does not have a history or diagnosis of COPD. Patient is a smoker, smoking cessation education done. A comprehensive packet with tips to quit and information on outpatient classes given to patient.  Smoking Cessation Intervention and education completed,15 minutes spent on smoking cessation education with patient    Provided smoking cessation packet with \"Tips to Quit\" and flyer for \"Free Smoking Cessation Classes\".     "

## 2020-07-24 LAB
ABO GROUP BLD: NORMAL
BARCODED ABORH UBTYP: 5100
BARCODED PRD CODE UBPRD: NORMAL
BARCODED UNIT NUM UBUNT: NORMAL
BLD GP AB SCN SERPL QL: NORMAL
COMPONENT R 8504R: NORMAL
HCT VFR BLD AUTO: 24.4 % (ref 42–52)
HGB BLD-MCNC: 7.3 G/DL (ref 14–18)
HGB BLD-MCNC: 7.4 G/DL (ref 14–18)
HGB BLD-MCNC: 7.8 G/DL (ref 14–18)
PRODUCT TYPE UPROD: NORMAL
RH BLD: NORMAL
UNIT STATUS USTAT: NORMAL

## 2020-07-24 PROCEDURE — 86850 RBC ANTIBODY SCREEN: CPT

## 2020-07-24 PROCEDURE — 700105 HCHG RX REV CODE 258: Performed by: FAMILY MEDICINE

## 2020-07-24 PROCEDURE — 85018 HEMOGLOBIN: CPT | Mod: 91

## 2020-07-24 PROCEDURE — 85014 HEMATOCRIT: CPT

## 2020-07-24 PROCEDURE — 700111 HCHG RX REV CODE 636 W/ 250 OVERRIDE (IP): Performed by: FAMILY MEDICINE

## 2020-07-24 PROCEDURE — A9270 NON-COVERED ITEM OR SERVICE: HCPCS | Performed by: ORTHOPAEDIC SURGERY

## 2020-07-24 PROCEDURE — 700102 HCHG RX REV CODE 250 W/ 637 OVERRIDE(OP): Performed by: ORTHOPAEDIC SURGERY

## 2020-07-24 PROCEDURE — 700111 HCHG RX REV CODE 636 W/ 250 OVERRIDE (IP): Performed by: ORTHOPAEDIC SURGERY

## 2020-07-24 PROCEDURE — 36430 TRANSFUSION BLD/BLD COMPNT: CPT

## 2020-07-24 PROCEDURE — 700111 HCHG RX REV CODE 636 W/ 250 OVERRIDE (IP): Performed by: PHYSICIAN ASSISTANT

## 2020-07-24 PROCEDURE — 86900 BLOOD TYPING SEROLOGIC ABO: CPT

## 2020-07-24 PROCEDURE — 770001 HCHG ROOM/CARE - MED/SURG/GYN PRIV*

## 2020-07-24 PROCEDURE — 30233N1 TRANSFUSION OF NONAUTOLOGOUS RED BLOOD CELLS INTO PERIPHERAL VEIN, PERCUTANEOUS APPROACH: ICD-10-PCS | Performed by: FAMILY MEDICINE

## 2020-07-24 PROCEDURE — 99233 SBSQ HOSP IP/OBS HIGH 50: CPT | Performed by: FAMILY MEDICINE

## 2020-07-24 PROCEDURE — 36415 COLL VENOUS BLD VENIPUNCTURE: CPT

## 2020-07-24 PROCEDURE — C9113 INJ PANTOPRAZOLE SODIUM, VIA: HCPCS | Performed by: FAMILY MEDICINE

## 2020-07-24 PROCEDURE — 86923 COMPATIBILITY TEST ELECTRIC: CPT

## 2020-07-24 PROCEDURE — P9016 RBC LEUKOCYTES REDUCED: HCPCS

## 2020-07-24 PROCEDURE — 86901 BLOOD TYPING SEROLOGIC RH(D): CPT

## 2020-07-24 PROCEDURE — 700112 HCHG RX REV CODE 229: Performed by: ORTHOPAEDIC SURGERY

## 2020-07-24 RX ORDER — HYDROMORPHONE HYDROCHLORIDE 2 MG/ML
2-3 INJECTION, SOLUTION INTRAMUSCULAR; INTRAVENOUS; SUBCUTANEOUS
Status: DISCONTINUED | OUTPATIENT
Start: 2020-07-24 | End: 2020-07-27 | Stop reason: HOSPADM

## 2020-07-24 RX ORDER — DIPHENHYDRAMINE HCL 25 MG
25 TABLET ORAL ONCE
Status: ACTIVE | OUTPATIENT
Start: 2020-07-24 | End: 2020-07-25

## 2020-07-24 RX ORDER — DIPHENHYDRAMINE HYDROCHLORIDE 50 MG/ML
25 INJECTION INTRAMUSCULAR; INTRAVENOUS ONCE
Status: ACTIVE | OUTPATIENT
Start: 2020-07-24 | End: 2020-07-25

## 2020-07-24 RX ORDER — ACETAMINOPHEN 325 MG/1
650 TABLET ORAL ONCE
Status: ACTIVE | OUTPATIENT
Start: 2020-07-24 | End: 2020-07-25

## 2020-07-24 RX ADMIN — PANTOPRAZOLE SODIUM 40 MG: 40 INJECTION, POWDER, LYOPHILIZED, FOR SOLUTION INTRAVENOUS at 16:58

## 2020-07-24 RX ADMIN — ACETAMINOPHEN 1000 MG: 500 TABLET, FILM COATED ORAL at 12:04

## 2020-07-24 RX ADMIN — ACETAMINOPHEN 1000 MG: 500 TABLET, FILM COATED ORAL at 16:54

## 2020-07-24 RX ADMIN — SODIUM CHLORIDE 2025 MG: 9 INJECTION, SOLUTION INTRAVENOUS at 21:12

## 2020-07-24 RX ADMIN — HYDROMORPHONE HYDROCHLORIDE 2 MG: 1 INJECTION, SOLUTION INTRAMUSCULAR; INTRAVENOUS; SUBCUTANEOUS at 12:04

## 2020-07-24 RX ADMIN — PREGABALIN 150 MG: 75 CAPSULE ORAL at 12:04

## 2020-07-24 RX ADMIN — MORPHINE SULFATE 15 MG: 15 TABLET ORAL at 06:16

## 2020-07-24 RX ADMIN — NICOTINE 7 MG: 7 PATCH TRANSDERMAL at 16:54

## 2020-07-24 RX ADMIN — HYDROMORPHONE HYDROCHLORIDE 2 MG: 1 INJECTION, SOLUTION INTRAMUSCULAR; INTRAVENOUS; SUBCUTANEOUS at 01:25

## 2020-07-24 RX ADMIN — HYDROMORPHONE HYDROCHLORIDE 2 MG: 1 INJECTION, SOLUTION INTRAMUSCULAR; INTRAVENOUS; SUBCUTANEOUS at 03:36

## 2020-07-24 RX ADMIN — DOCUSATE SODIUM 100 MG: 100 CAPSULE, LIQUID FILLED ORAL at 16:54

## 2020-07-24 RX ADMIN — MORPHINE SULFATE 15 MG: 15 TABLET ORAL at 12:33

## 2020-07-24 RX ADMIN — MAGNESIUM HYDROXIDE 30 ML: 400 SUSPENSION ORAL at 11:00

## 2020-07-24 RX ADMIN — MORPHINE SULFATE 15 MG: 15 TABLET ORAL at 21:11

## 2020-07-24 RX ADMIN — MORPHINE SULFATE 15 MG: 15 TABLET ORAL at 04:26

## 2020-07-24 RX ADMIN — MORPHINE SULFATE 15 MG: 15 TABLET ORAL at 18:30

## 2020-07-24 RX ADMIN — DOCUSATE SODIUM 100 MG: 100 CAPSULE, LIQUID FILLED ORAL at 06:19

## 2020-07-24 RX ADMIN — HYDROMORPHONE HYDROCHLORIDE 2 MG: 1 INJECTION, SOLUTION INTRAMUSCULAR; INTRAVENOUS; SUBCUTANEOUS at 08:04

## 2020-07-24 RX ADMIN — PREGABALIN 150 MG: 75 CAPSULE ORAL at 16:54

## 2020-07-24 RX ADMIN — HYDROMORPHONE HYDROCHLORIDE 3 MG: 2 INJECTION, SOLUTION INTRAMUSCULAR; INTRAVENOUS; SUBCUTANEOUS at 19:47

## 2020-07-24 RX ADMIN — PREGABALIN 150 MG: 75 CAPSULE ORAL at 06:18

## 2020-07-24 RX ADMIN — PANTOPRAZOLE SODIUM 40 MG: 40 INJECTION, POWDER, LYOPHILIZED, FOR SOLUTION INTRAVENOUS at 06:24

## 2020-07-24 RX ADMIN — HYDROMORPHONE HYDROCHLORIDE 3 MG: 2 INJECTION, SOLUTION INTRAMUSCULAR; INTRAVENOUS; SUBCUTANEOUS at 21:47

## 2020-07-24 RX ADMIN — HYDROMORPHONE HYDROCHLORIDE 2 MG: 1 INJECTION, SOLUTION INTRAMUSCULAR; INTRAVENOUS; SUBCUTANEOUS at 14:43

## 2020-07-24 RX ADMIN — ACETAMINOPHEN 1000 MG: 500 TABLET, FILM COATED ORAL at 06:18

## 2020-07-24 RX ADMIN — HYDROMORPHONE HYDROCHLORIDE 2 MG: 1 INJECTION, SOLUTION INTRAMUSCULAR; INTRAVENOUS; SUBCUTANEOUS at 10:07

## 2020-07-24 RX ADMIN — MORPHINE SULFATE 15 MG: 15 TABLET ORAL at 14:44

## 2020-07-24 RX ADMIN — ACETAMINOPHEN 1000 MG: 500 TABLET, FILM COATED ORAL at 01:24

## 2020-07-24 RX ADMIN — SODIUM CHLORIDE 25 MG: 9 INJECTION, SOLUTION INTRAVENOUS at 20:01

## 2020-07-24 RX ADMIN — MORPHINE SULFATE 15 MG: 15 TABLET ORAL at 09:03

## 2020-07-24 RX ADMIN — HYDROMORPHONE HYDROCHLORIDE 2 MG: 1 INJECTION, SOLUTION INTRAMUSCULAR; INTRAVENOUS; SUBCUTANEOUS at 06:04

## 2020-07-24 RX ADMIN — STANDARDIZED SENNA CONCENTRATE AND DOCUSATE SODIUM 1 TABLET: 8.6; 5 TABLET, FILM COATED ORAL at 21:11

## 2020-07-24 RX ADMIN — HYDROMORPHONE HYDROCHLORIDE 2 MG: 1 INJECTION, SOLUTION INTRAMUSCULAR; INTRAVENOUS; SUBCUTANEOUS at 16:58

## 2020-07-24 RX ADMIN — HYDROMORPHONE HYDROCHLORIDE 3 MG: 2 INJECTION, SOLUTION INTRAMUSCULAR; INTRAVENOUS; SUBCUTANEOUS at 23:50

## 2020-07-24 ASSESSMENT — ENCOUNTER SYMPTOMS
TINGLING: 0
COUGH: 0
FEVER: 0
HEMOPTYSIS: 0
HEARTBURN: 0
HEADACHES: 0
SPUTUM PRODUCTION: 0
CHILLS: 0
DEPRESSION: 0
VOMITING: 0
DOUBLE VISION: 0
PALPITATIONS: 0
NAUSEA: 0
MYALGIAS: 1
DIZZINESS: 0
BLURRED VISION: 0
ORTHOPNEA: 0

## 2020-07-24 ASSESSMENT — LIFESTYLE VARIABLES: SUBSTANCE_ABUSE: 0

## 2020-07-24 NOTE — PROGRESS NOTES
Bedside report received from day RN. Pt is alert and orient. Pt had family at the bedside.Evenning assessment done. Pt complained of pain. Medicated per MAR. CMS intact. Bed in lowest position and locked. Call light within reached rounding in place.

## 2020-07-24 NOTE — PROGRESS NOTES
"S: In severe R knee pain today, not behind on pain meds which is being corrected now.  Walked 80 feet yeaterday.     O: Minimal hemovac output- will DC today.  Vitals stable, some lightheadedness when up    Blood pressure 116/56, pulse 66, temperature 36.6 °C (97.8 °F), temperature source Oral, resp. rate 20, height 1.803 m (5' 11\"), weight 100.2 kg (220 lb 14.4 oz), SpO2 100 %.    Recent Labs     07/23/20  0330 07/23/20  1011 07/23/20  1745 07/24/20  0151   WBC 9.2 10.8  --   --    RBC 2.51* 2.88*  --   --    HEMOGLOBIN 7.2* 8.3* 7.2* 7.3*   HEMATOCRIT 22.8* 26.1*  --   --    MCV 90.8 90.6  --   --    MCH 28.7 28.8  --   --    MCHC 31.6* 31.8*  --   --    RDW 42.8 43.4  --   --    PLATELETCT 216 231  --   --    MPV 9.5 9.2  --   --          Intake/Output Summary (Last 24 hours) at 7/24/2020 0625  Last data filed at 7/24/2020 0525  Gross per 24 hour   Intake 930 ml   Output 1700 ml   Net -770 ml         A:  Patient Active Problem List    Diagnosis Date Noted   • Acute blood loss anemia 07/23/2020     Priority: High   • Atrial fibrillation with rapid ventricular response (HCC) 11/05/2019     Priority: High   • Paroxysmal atrial fibrillation (HCC) 07/23/2020     Priority: Low   • Essential hypertension 09/13/2019     Priority: Low   • Rheumatoid arthritis (HCC) 09/08/2015     Priority: Low   • Chronic pain of right knee 09/08/2015     Priority: Low   • Cigarette nicotine dependence without complication 07/21/2020   • Positive occult stool blood test 09/17/2019   • Aortic root dilatation ([facility]) 09/13/2019   • Elevated alkaline phosphatase level 09/13/2019   • Aortic stenosis, moderate 09/12/2019   • Iron deficiency anemia, unspecified iron deficiency anemia type 08/15/2019   • Localized edema 08/15/2019   • Aortic ejection murmur 03/08/2019   • Obesity (BMI 35.0-39.9 without comorbidity) (HCC) 08/09/2018   • Pagophagia 11/13/2017   • Adult ADHD 09/08/2010   • MONICA (obstructive sleep apnea) 03/09/2010   • Mood disorder " ([facility]) 05/21/2009   • Anxiety disorder 05/21/2009   • Eczema 05/21/2009       Poor pain control after R total knee primary hinge  Hgb stable, not enough symptoms for transfusion    PLAN: Continue to mobilize and follow Hgb.  Remove hemovac.  Home when more stable.  Appreciate continued help from hospitalists.

## 2020-07-24 NOTE — THERAPY
Missed Therapy     Patient Name: Richard Hubbard II  Age:  45 y.o., Sex:  male  Medical Record #: [id number]  Today's Date: 7/24/2020    Discussed missed therapy with RN. Multiple attempts to work with Pt this date. Pt reported pain that is preventing him from working with OT, will attempt again in am.

## 2020-07-24 NOTE — THERAPY
Missed Therapy     Patient Name: Richard Hubbard II  Age:  45 y.o., Sex:  male  Medical Record #: [id number]  Today's Date: 7/24/2020    Discussed missed therapy with RN       07/24/20 1035   Interdisciplinary Plan of Care Collaboration   IDT Collaboration with  Nursing   Patient Position at End of Therapy In Bed;Call Light within Reach;Tray Table within Reach;Phone within Reach   Collaboration Comments Attempted PT txt session, however, pt declined as he reports of severe pain while resting in bed. Will re-attempt as able.

## 2020-07-24 NOTE — PROGRESS NOTES
Report received from NOC RN, assumed care of pt.   POC and medications reviewed with pt. Pt verbalized understanding.   AOx4  C/o pain at this time. Will medicate per MAR  Denies SOB, or dizziness at this time.   Safety measures in place.  Hourly rounding in place.

## 2020-07-24 NOTE — PROGRESS NOTES
Hospital Medicine Daily Progress Note    Date of Service  7/24/2020    Chief Complaint  45 y.o. male admitted 7/22/2020 with right knee pain.    Hospital Course  45 y.o. male who has complicated past medical history including severe rheumatoid arthritis currently on Xeljanz, history of paroxysmal A. fib on aspirin, and history of peptic ulcer disease has been admitted by orthopedic surgery for elective right knee arthroplasty due to severe osteoarthritic changes and deformity.  Patient had surgeries done on this joint in the past but the joint now becomes very stiff and deformed.  Patient had arthroplasty done on right knee joint on 7/22/2020.  His hemoglobin noted to drop to 7.2 g/dL on 7/22/2020 from 11.5 g/dL on 7/16/2020.  Hospital medicine was consulted for this reason.  In between patient denies any hematochezia or melena.  As mentioned above he has a history of upper GI bleed with endoscopy showing peptic ulcer disease with no active bleeding consistent with NSAIDs gastritis at that time.  Patient denies using any NSAIDs since 7/16/2020.  He only takes baby aspirin for paroxysmal A. fib.  He stated that he did not have a bowel movement over the last 4 days.  Had a blood transfusion in January 2020.  BUN was within normal limits.  Currently hemodynamically stable with occasionally soft blood pressure.  Apparently patient had a GI work-up done in [place] long time ago.  Have not had colonoscopy recently.  Interval Problem Update  Resting in bed comfortably.  Still no bowel movement.  H&H trended down slightly but hemodynamically continue to be stable.  Discussed the case with on-call GI Dr. Barth who does not think that patient has active GI bleed at this point.  Recommended a unit of PRBC and iron transfusion based on iron studies and to follow-up as an outpatient with GI.  Patient denies any chest pain or shortness of breath.  Afebrile last 24 hours.  Hemodynamically and clinically stable.  No issues overnight  per staff.    Consultants/Specialty  Therapeutic surgery (primary team)    Code Status  Full code    Disposition  Per orthopedic surgery    Review of Systems  Review of Systems   Constitutional: Negative for chills and fever.   HENT: Negative for ear pain, hearing loss and tinnitus.    Eyes: Negative for blurred vision and double vision.   Respiratory: Negative for cough, hemoptysis and sputum production.    Cardiovascular: Negative for chest pain, palpitations and orthopnea.   Gastrointestinal: Negative for heartburn, nausea and vomiting.   Genitourinary: Negative for dysuria, frequency and urgency.   Musculoskeletal: Positive for joint pain (right knee pain) and myalgias.   Skin: Negative for rash.   Neurological: Negative for dizziness, tingling and headaches.   Psychiatric/Behavioral: Negative for depression, substance abuse and suicidal ideas.        Physical Exam  Temp:  [36.3 °C (97.3 °F)-36.8 °C (98.2 °F)] 36.6 °C (97.9 °F)  Pulse:  [66-87] 67  Resp:  [16-20] 18  BP: (100-116)/(52-78) 113/78  SpO2:  [98 %-100 %] 100 %    Physical Exam  Constitutional:       General: He is not in acute distress.     Appearance: He is not ill-appearing.   HENT:      Head: Normocephalic and atraumatic.      Nose: No rhinorrhea.      Mouth/Throat:      Pharynx: No oropharyngeal exudate or posterior oropharyngeal erythema.   Eyes:      Extraocular Movements: Extraocular movements intact.      Pupils: Pupils are equal, round, and reactive to light.   Cardiovascular:      Rate and Rhythm: Normal rate and regular rhythm.      Heart sounds: No friction rub. No gallop.    Pulmonary:      Effort: Pulmonary effort is normal.      Breath sounds: No stridor.   Abdominal:      General: Abdomen is flat. There is no distension.   Musculoskeletal:      Comments: Right leg in brace with ice therapy    Skin:     Coloration: Skin is not jaundiced or pale.   Neurological:      General: No focal deficit present.      Mental Status: He is alert and  oriented to person, place, and time.   Psychiatric:         Mood and Affect: Mood normal.         Behavior: Behavior normal.         Fluids    Intake/Output Summary (Last 24 hours) at 7/24/2020 1202  Last data filed at 7/24/2020 1000  Gross per 24 hour   Intake 1530 ml   Output 2100 ml   Net -570 ml       Laboratory  Recent Labs     07/23/20  0330 07/23/20  1011 07/23/20  1745 07/24/20  0151 07/24/20  0930   WBC 9.2 10.8  --   --   --    RBC 2.51* 2.88*  --   --   --    HEMOGLOBIN 7.2* 8.3* 7.2* 7.3* 7.4*   HEMATOCRIT 22.8* 26.1*  --   --   --    MCV 90.8 90.6  --   --   --    MCH 28.7 28.8  --   --   --    MCHC 31.6* 31.8*  --   --   --    RDW 42.8 43.4  --   --   --    PLATELETCT 216 231  --   --   --    MPV 9.5 9.2  --   --   --      Recent Labs     07/23/20  0330   SODIUM 135   POTASSIUM 4.5   CHLORIDE 104   CO2 23   GLUCOSE 110*   BUN 10   CREATININE 0.65   CALCIUM 7.5*                   Imaging  DX-KNEE 2- RIGHT   Final Result      Post right knee tricompartment constrained arthroplasty.           Assessment/Plan  * Acute blood loss anemia- (present on admission)  Assessment & Plan  Hemoglobin and 7/16/2020 was 11.5 which dropped to 7.2 on 7/23/2020.  Had right knee arthroplasty on 7/22/2020.  History of peptic ulcer disease in the past with EGD done as an outpatient in August 2019 which showed PUD with no active bleeding.  This acute anemia could be from surgery or dilutional with less likely GI loss with normal BUN and no bowel movement last 4 days and no melena.  I discussed the case with GI Dr. Dunn.  We will start IV PPI.  Will monitor H&H closely.  Transfuse with PRBC if hemoglobin drops below 7 g/dL.  If continues to be stable, outpatient work-up will be recommended.  However, if H&H continues to trend down, then full inpatient GI work-up will be granted.  Occult stool blood test ordered.  7/24: H&H trended down slightly.  Discussed with GI Dr. Huffman who still does not think that patient have a GI  bleed at this point and who recommended 1 units of PRBC and iron infusion and to follow-up with GI as an outpatient.    Paroxysmal atrial fibrillation (HCC)- (present on admission)  Assessment & Plan  History of with CHADVASC score 0  On baby aspirin at home which will be held for now due to concern of GI bleed.    Rheumatoid arthritis (HCC)- (present on admission)  Assessment & Plan  On Xeljanz fusion as an outpatient  Severe with significant right knee osteoarthritic degenerative changes and deformity.  Status post arthroplasty done by orthopedic surgery on 7/22/2020.  Postop care per orthopedic surgery.  Follow-up with rheumatology as an outpatient.       VTE prophylaxis: SCDs.  No anticoagulation due to concern of anemia requiring transfusion.

## 2020-07-25 LAB
ALBUMIN SERPL BCP-MCNC: 2.2 G/DL (ref 3.2–4.9)
ALBUMIN/GLOB SERPL: 0.7 G/DL
ALP SERPL-CCNC: 164 U/L (ref 30–99)
ALT SERPL-CCNC: 9 U/L (ref 2–50)
ANION GAP SERPL CALC-SCNC: 7 MMOL/L (ref 7–16)
AST SERPL-CCNC: 15 U/L (ref 12–45)
BASOPHILS # BLD AUTO: 0.7 % (ref 0–1.8)
BASOPHILS # BLD: 0.05 K/UL (ref 0–0.12)
BILIRUB SERPL-MCNC: 0.3 MG/DL (ref 0.1–1.5)
BUN SERPL-MCNC: 13 MG/DL (ref 8–22)
CALCIUM SERPL-MCNC: 7.9 MG/DL (ref 8.4–10.2)
CHLORIDE SERPL-SCNC: 103 MMOL/L (ref 96–112)
CO2 SERPL-SCNC: 26 MMOL/L (ref 20–33)
CREAT SERPL-MCNC: 0.55 MG/DL (ref 0.5–1.4)
EOSINOPHIL # BLD AUTO: 0.11 K/UL (ref 0–0.51)
EOSINOPHIL NFR BLD: 1.5 % (ref 0–6.9)
ERYTHROCYTE [DISTWIDTH] IN BLOOD BY AUTOMATED COUNT: 45.6 FL (ref 35.9–50)
GLOBULIN SER CALC-MCNC: 3 G/DL (ref 1.9–3.5)
GLUCOSE SERPL-MCNC: 87 MG/DL (ref 65–99)
HCT VFR BLD AUTO: 25.5 % (ref 42–52)
HGB BLD-MCNC: 8.3 G/DL (ref 14–18)
IMM GRANULOCYTES # BLD AUTO: 0.05 K/UL (ref 0–0.11)
IMM GRANULOCYTES NFR BLD AUTO: 0.7 % (ref 0–0.9)
LYMPHOCYTES # BLD AUTO: 1.84 K/UL (ref 1–4.8)
LYMPHOCYTES NFR BLD: 24.6 % (ref 22–41)
MCH RBC QN AUTO: 29.2 PG (ref 27–33)
MCHC RBC AUTO-ENTMCNC: 32.5 G/DL (ref 33.7–35.3)
MCV RBC AUTO: 89.8 FL (ref 81.4–97.8)
MONOCYTES # BLD AUTO: 0.66 K/UL (ref 0–0.85)
MONOCYTES NFR BLD AUTO: 8.8 % (ref 0–13.4)
NEUTROPHILS # BLD AUTO: 4.78 K/UL (ref 1.82–7.42)
NEUTROPHILS NFR BLD: 63.7 % (ref 44–72)
NRBC # BLD AUTO: 0 K/UL
NRBC BLD-RTO: 0 /100 WBC
PLATELET # BLD AUTO: 180 K/UL (ref 164–446)
PMV BLD AUTO: 9.6 FL (ref 9–12.9)
POTASSIUM SERPL-SCNC: 4.6 MMOL/L (ref 3.6–5.5)
PROT SERPL-MCNC: 5.2 G/DL (ref 6–8.2)
RBC # BLD AUTO: 2.84 M/UL (ref 4.7–6.1)
SODIUM SERPL-SCNC: 136 MMOL/L (ref 135–145)
WBC # BLD AUTO: 7.5 K/UL (ref 4.8–10.8)

## 2020-07-25 PROCEDURE — A9270 NON-COVERED ITEM OR SERVICE: HCPCS | Performed by: ORTHOPAEDIC SURGERY

## 2020-07-25 PROCEDURE — 85025 COMPLETE CBC W/AUTO DIFF WBC: CPT

## 2020-07-25 PROCEDURE — 700111 HCHG RX REV CODE 636 W/ 250 OVERRIDE (IP): Performed by: FAMILY MEDICINE

## 2020-07-25 PROCEDURE — C9113 INJ PANTOPRAZOLE SODIUM, VIA: HCPCS | Performed by: FAMILY MEDICINE

## 2020-07-25 PROCEDURE — 99232 SBSQ HOSP IP/OBS MODERATE 35: CPT | Performed by: FAMILY MEDICINE

## 2020-07-25 PROCEDURE — 700112 HCHG RX REV CODE 229: Performed by: ORTHOPAEDIC SURGERY

## 2020-07-25 PROCEDURE — 700111 HCHG RX REV CODE 636 W/ 250 OVERRIDE (IP): Performed by: PHYSICIAN ASSISTANT

## 2020-07-25 PROCEDURE — 80053 COMPREHEN METABOLIC PANEL: CPT

## 2020-07-25 PROCEDURE — 36415 COLL VENOUS BLD VENIPUNCTURE: CPT

## 2020-07-25 PROCEDURE — 97535 SELF CARE MNGMENT TRAINING: CPT

## 2020-07-25 PROCEDURE — 770001 HCHG ROOM/CARE - MED/SURG/GYN PRIV*

## 2020-07-25 PROCEDURE — 97530 THERAPEUTIC ACTIVITIES: CPT

## 2020-07-25 PROCEDURE — 700101 HCHG RX REV CODE 250: Performed by: ORTHOPAEDIC SURGERY

## 2020-07-25 PROCEDURE — 700102 HCHG RX REV CODE 250 W/ 637 OVERRIDE(OP): Performed by: ORTHOPAEDIC SURGERY

## 2020-07-25 RX ORDER — OMEPRAZOLE 20 MG/1
20 CAPSULE, DELAYED RELEASE ORAL DAILY
Status: DISCONTINUED | OUTPATIENT
Start: 2020-07-26 | End: 2020-07-27 | Stop reason: HOSPADM

## 2020-07-25 RX ADMIN — PREGABALIN 150 MG: 75 CAPSULE ORAL at 17:26

## 2020-07-25 RX ADMIN — MORPHINE SULFATE 15 MG: 15 TABLET ORAL at 03:03

## 2020-07-25 RX ADMIN — MORPHINE SULFATE 15 MG: 15 TABLET ORAL at 21:13

## 2020-07-25 RX ADMIN — DOCUSATE SODIUM 100 MG: 100 CAPSULE, LIQUID FILLED ORAL at 06:09

## 2020-07-25 RX ADMIN — HYDROMORPHONE HYDROCHLORIDE 3 MG: 2 INJECTION, SOLUTION INTRAMUSCULAR; INTRAVENOUS; SUBCUTANEOUS at 04:12

## 2020-07-25 RX ADMIN — MORPHINE SULFATE 15 MG: 15 TABLET ORAL at 09:06

## 2020-07-25 RX ADMIN — MORPHINE SULFATE 15 MG: 15 TABLET ORAL at 13:04

## 2020-07-25 RX ADMIN — ACETAMINOPHEN 1000 MG: 500 TABLET, FILM COATED ORAL at 06:09

## 2020-07-25 RX ADMIN — HYDROMORPHONE HYDROCHLORIDE 3 MG: 2 INJECTION, SOLUTION INTRAMUSCULAR; INTRAVENOUS; SUBCUTANEOUS at 15:35

## 2020-07-25 RX ADMIN — ACETAMINOPHEN 1000 MG: 500 TABLET, FILM COATED ORAL at 17:26

## 2020-07-25 RX ADMIN — PANTOPRAZOLE SODIUM 40 MG: 40 INJECTION, POWDER, LYOPHILIZED, FOR SOLUTION INTRAVENOUS at 06:11

## 2020-07-25 RX ADMIN — PREGABALIN 150 MG: 75 CAPSULE ORAL at 06:09

## 2020-07-25 RX ADMIN — ACETAMINOPHEN 1000 MG: 500 TABLET, FILM COATED ORAL at 11:27

## 2020-07-25 RX ADMIN — HYDROMORPHONE HYDROCHLORIDE 3 MG: 2 INJECTION, SOLUTION INTRAMUSCULAR; INTRAVENOUS; SUBCUTANEOUS at 09:05

## 2020-07-25 RX ADMIN — PREGABALIN 150 MG: 75 CAPSULE ORAL at 11:27

## 2020-07-25 RX ADMIN — HYDROMORPHONE HYDROCHLORIDE 2 MG: 2 INJECTION, SOLUTION INTRAMUSCULAR; INTRAVENOUS; SUBCUTANEOUS at 06:12

## 2020-07-25 RX ADMIN — HYDROMORPHONE HYDROCHLORIDE 3 MG: 2 INJECTION, SOLUTION INTRAMUSCULAR; INTRAVENOUS; SUBCUTANEOUS at 02:13

## 2020-07-25 RX ADMIN — NICOTINE 7 MG: 7 PATCH TRANSDERMAL at 17:26

## 2020-07-25 RX ADMIN — STANDARDIZED SENNA CONCENTRATE AND DOCUSATE SODIUM 1 TABLET: 8.6; 5 TABLET, FILM COATED ORAL at 21:13

## 2020-07-25 RX ADMIN — DOCUSATE SODIUM 100 MG: 100 CAPSULE, LIQUID FILLED ORAL at 17:26

## 2020-07-25 RX ADMIN — MORPHINE SULFATE 15 MG: 15 TABLET ORAL at 06:32

## 2020-07-25 RX ADMIN — HYDROMORPHONE HYDROCHLORIDE 2 MG: 2 INJECTION, SOLUTION INTRAMUSCULAR; INTRAVENOUS; SUBCUTANEOUS at 11:29

## 2020-07-25 RX ADMIN — MORPHINE SULFATE 15 MG: 15 TABLET ORAL at 19:44

## 2020-07-25 RX ADMIN — MORPHINE SULFATE 15 MG: 15 TABLET ORAL at 00:29

## 2020-07-25 RX ADMIN — MORPHINE SULFATE 15 MG: 15 TABLET ORAL at 15:33

## 2020-07-25 RX ADMIN — HYDROMORPHONE HYDROCHLORIDE 3 MG: 2 INJECTION, SOLUTION INTRAMUSCULAR; INTRAVENOUS; SUBCUTANEOUS at 20:18

## 2020-07-25 RX ADMIN — POLYETHYLENE GLYCOL 3350 1 PACKET: 17 POWDER, FOR SOLUTION ORAL at 17:32

## 2020-07-25 ASSESSMENT — ENCOUNTER SYMPTOMS
NAUSEA: 0
BLURRED VISION: 0
HALLUCINATIONS: 0
TREMORS: 0
HEMOPTYSIS: 0
DEPRESSION: 0
HEARTBURN: 0
VOMITING: 0
BLOOD IN STOOL: 0
DIZZINESS: 0
SPUTUM PRODUCTION: 0
ORTHOPNEA: 0
COUGH: 0
PALPITATIONS: 0
FEVER: 0
DOUBLE VISION: 0
HEADACHES: 0
TINGLING: 0
WEIGHT LOSS: 0
CHILLS: 0
CLAUDICATION: 0
MYALGIAS: 1
ABDOMINAL PAIN: 0

## 2020-07-25 ASSESSMENT — COGNITIVE AND FUNCTIONAL STATUS - GENERAL
HELP NEEDED FOR BATHING: A LOT
DRESSING REGULAR LOWER BODY CLOTHING: A LOT
TOILETING: A LITTLE
PERSONAL GROOMING: A LITTLE
DRESSING REGULAR UPPER BODY CLOTHING: A LITTLE
DAILY ACTIVITIY SCORE: 17
SUGGESTED CMS G CODE MODIFIER DAILY ACTIVITY: CK

## 2020-07-25 ASSESSMENT — LIFESTYLE VARIABLES: SUBSTANCE_ABUSE: 0

## 2020-07-25 ASSESSMENT — GAIT ASSESSMENTS
GAIT LEVEL OF ASSIST: SUPERVISED
DISTANCE (FEET): 50
DEVIATION: ANTALGIC
ASSISTIVE DEVICE: FRONT WHEEL WALKER

## 2020-07-25 NOTE — THERAPY
Physical Therapy   Daily Treatment     Patient Name: Richard Hubbard II  Age:  45 y.o., Sex:  male  Medical Record #: [id number]  Today's Date: 7/25/2020     Precautions: Weight Bearing As Tolerated Right Lower Extremity    Assessment    Slow improvement with gait and transfers limited by pain level    Plan    Continue current treatment plan.      07/25/20 1000   Balance   Sitting Balance (Static) Good   Sitting Balance (Dynamic) Fair +   Standing Balance (Static) Fair   Standing Balance (Dynamic) Fair   Weight Shift Sitting Good   Weight Shift Standing Fair   Gait Analysis   Gait Level Of Assist Supervised   Assistive Device Front Wheel Walker   Distance (Feet) 50   # of Times Distance was Traveled 1   Deviation Antalgic   # of Stairs Climbed 0   Weight Bearing Status WBAT R   Bed Mobility    Supine to Sit Supervised   Sit to Supine Supervised   Scooting Supervised   Functional Mobility   Sit to Stand Minimal Assist   Bed, Chair, Wheelchair Transfer Minimal Assist   Activity Tolerance   Sitting in Chair > 1hr   Standing 10   Short Term Goals    Goal Outcome # 1 Progressing slower than expected   Goal Outcome # 2 Progressing slower than expected   Goal Outcome # 3 Goal not met   Session Information   Date / Session Number  7/25-2 2/7 7/30       Discharge recommendations:  Recommend post-acute placement for continued physical therapy services prior to discharge home.

## 2020-07-25 NOTE — PROGRESS NOTES
Report received from night shift RN. Assume care. Pt. AAOx4, flat affect, looks comfortable sitting up in bed,  Assessment completed. VSS. Pain at acceptable levels, edema +3 to RLE, able to wiggle toes and dorsi/plantar flex feet, good CMS and pulses to brock LE, c/o numbness. Nerve block and prevena in place, dressing in place DCI, immobilizer in place as well. Pt was update for the care for the day. White board updated, All question answered. Pt has call light within reach,  bed is in the lowest position. Pt has no other needs at this time.

## 2020-07-25 NOTE — THERAPY
Occupational Therapy  Daily Treatment     Patient Name: Richard Hubbard II  Age:  45 y.o., Sex:  male  Medical Record #: [id number]  Today's Date: 7/25/2020     Precautions  Precautions: Weight Bearing As Tolerated Right Lower Extremity, Immobilizer Right Upper Extremity  Comments: immobiliser  no ROM    Assessment    Pt was in the recliner chair at start of session, better pain control and ability to participate with therapy. Pt continues to requires assist in all areas of ADLS and functional mobility. Will benefit from continued OT services to increase functional independence and safety.     Plan    Continue current treatment plan.    Discharge recommendations:  Recommend post-acute placement for additional occupational therapy services prior to discharge home.       07/25/20 1134   Precautions   Precautions Weight Bearing As Tolerated Right Lower Extremity;Immobilizer Right Upper Extremity   Pain 0 - 10 Group   Therapist Pain Assessment During Activity;Nurse Notified   Balance   Sitting Balance (Static) Good   Sitting Balance (Dynamic) Fair +   Standing Balance (Static) Fair   Standing Balance (Dynamic) Fair   Weight Shift Sitting Good   Weight Shift Standing Fair   Skilled Intervention Verbal Cuing   Activities of Daily Living   Eating Independent   Grooming Supervision;Standing   Upper Body Dressing Supervision   Lower Body Dressing Moderate Assist   Skilled Intervention Verbal Cuing   Functional Mobility   Sit to Stand Minimal Assist   Bed, Chair, Wheelchair Transfer Minimal Assist   Transfer Method Stand Step   Mobility FWW   Activity Tolerance   Sitting in Chair >1 hr   Standing 10 min   Patient / Family Goals   Patient / Family Goal #1 Home after rehab   Short Term Goals   Short Term Goal # 1 Pt will be able to complete FB dressing with Supervision   Goal Outcome # 1 Progressing as expected   Short Term Goal # 2 Pt will be able to complete functional transfers with Supervision   Goal Outcome # 2  Progressing as expected   Short Term Goal # 3 Pt will be able to complete 10 min standing sinkside ADLS with Supervision, good activity tolerance and safety.   Goal Outcome # 3 Progressing as expected   Anticipated Discharge Equipment   DC Equipment Unable To Determine At This Time

## 2020-07-25 NOTE — PROGRESS NOTES
Hospital Medicine Daily Progress Note    Date of Service  7/25/2020    Chief Complaint  45 y.o. male admitted 7/22/2020 with right knee pain.    Hospital Course  45 y.o. male who has complicated past medical history including severe rheumatoid arthritis currently on Xeljanz, history of paroxysmal A. fib on aspirin, and history of peptic ulcer disease has been admitted by orthopedic surgery for elective right knee arthroplasty due to severe osteoarthritic changes and deformity.  Patient had surgeries done on this joint in the past but the joint now becomes very stiff and deformed.  Patient had arthroplasty done on right knee joint on 7/22/2020.  His hemoglobin noted to drop to 7.2 g/dL on 7/22/2020 from 11.5 g/dL on 7/16/2020.  Hospital medicine was consulted for this reason.  In between patient denies any hematochezia or melena.  As mentioned above he has a history of upper GI bleed with endoscopy showing peptic ulcer disease with no active bleeding consistent with NSAIDs gastritis at that time.  Patient denies using any NSAIDs since 7/16/2020.  He only takes baby aspirin for paroxysmal A. fib.  He stated that he did not have a bowel movement over the last 4 days.  Had a blood transfusion in January 2020.  BUN was within normal limits.  Currently hemodynamically stable with occasionally soft blood pressure.  Apparently patient had a GI work-up done in [place] long time ago.  Have not had colonoscopy recently.  Interval Problem Update  Resting in bed comfortably.  Still no bowel movement.  H&H trended down slightly but hemodynamically continue to be stable.  Discussed the case with on-call GI Dr. Barth who does not think that patient has active GI bleed at this point.  Recommended a unit of PRBC and iron transfusion based on iron studies and to follow-up as an outpatient with GI.  Patient denies any chest pain or shortness of breath.  Afebrile last 24 hours.  Hemodynamically and clinically stable.  No issues overnight  per staff.  7/25: Resting in bed comfortably.  Pain is fairly controlled.  H&H has been stable.  Blood pressure stable as well.  No acute distress noted.  No issues overnight per staff.  Consultants/Specialty  Orthopedic surgery (primary team)    Code Status  Full code    Disposition  Per orthopedic surgery    Review of Systems  Review of Systems   Constitutional: Negative for chills, fever and weight loss.   HENT: Negative for ear pain, hearing loss and tinnitus.    Eyes: Negative for blurred vision and double vision.   Respiratory: Negative for cough, hemoptysis and sputum production.    Cardiovascular: Negative for chest pain, palpitations, orthopnea and claudication.   Gastrointestinal: Negative for abdominal pain, blood in stool, heartburn, melena, nausea and vomiting.   Genitourinary: Negative for dysuria, frequency, hematuria and urgency.   Musculoskeletal: Positive for joint pain (right knee pain) and myalgias.   Skin: Negative for rash.   Neurological: Negative for dizziness, tingling, tremors and headaches.   Psychiatric/Behavioral: Negative for depression, hallucinations, substance abuse and suicidal ideas.        Physical Exam  Temp:  [36.5 °C (97.7 °F)-37 °C (98.6 °F)] 36.5 °C (97.7 °F)  Pulse:  [61-88] 75  Resp:  [18-20] 18  BP: (104-136)/(60-84) 111/78  SpO2:  [96 %-100 %] 100 %    Physical Exam  Constitutional:       General: He is not in acute distress.     Appearance: He is not ill-appearing.   HENT:      Head: Normocephalic and atraumatic.      Nose: No rhinorrhea.      Mouth/Throat:      Pharynx: No oropharyngeal exudate or posterior oropharyngeal erythema.   Eyes:      Extraocular Movements: Extraocular movements intact.      Pupils: Pupils are equal, round, and reactive to light.   Cardiovascular:      Rate and Rhythm: Normal rate and regular rhythm.      Heart sounds: No friction rub. No gallop.    Pulmonary:      Effort: Pulmonary effort is normal. No respiratory distress.      Breath sounds:  No stridor.   Abdominal:      General: Abdomen is flat. There is no distension.      Tenderness: There is no abdominal tenderness.   Musculoskeletal:      Comments: Right leg in brace with ice therapy    Skin:     Coloration: Skin is not jaundiced or pale.   Neurological:      General: No focal deficit present.      Mental Status: He is alert and oriented to person, place, and time.      Cranial Nerves: No cranial nerve deficit.   Psychiatric:         Mood and Affect: Mood normal.         Behavior: Behavior normal.         Fluids    Intake/Output Summary (Last 24 hours) at 7/25/2020 1417  Last data filed at 7/25/2020 1300  Gross per 24 hour   Intake 4113.33 ml   Output 4000 ml   Net 113.33 ml       Laboratory  Recent Labs     07/23/20  0330 07/23/20  1011  07/24/20  0930 07/24/20  1818 07/25/20  0445   WBC 9.2 10.8  --   --   --  7.5   RBC 2.51* 2.88*  --   --   --  2.84*   HEMOGLOBIN 7.2* 8.3*   < > 7.4* 7.8* 8.3*   HEMATOCRIT 22.8* 26.1*  --   --  24.4* 25.5*   MCV 90.8 90.6  --   --   --  89.8   MCH 28.7 28.8  --   --   --  29.2   MCHC 31.6* 31.8*  --   --   --  32.5*   RDW 42.8 43.4  --   --   --  45.6   PLATELETCT 216 231  --   --   --  180   MPV 9.5 9.2  --   --   --  9.6    < > = values in this interval not displayed.     Recent Labs     07/23/20  0330 07/25/20  0445   SODIUM 135 136   POTASSIUM 4.5 4.6   CHLORIDE 104 103   CO2 23 26   GLUCOSE 110* 87   BUN 10 13   CREATININE 0.65 0.55   CALCIUM 7.5* 7.9*                   Imaging  DX-KNEE 2- RIGHT   Final Result      Post right knee tricompartment constrained arthroplasty.           Assessment/Plan  * Acute blood loss anemia- (present on admission)  Assessment & Plan  Hemoglobin and 7/16/2020 was 11.5 which dropped to 7.2 on 7/23/2020.  Had right knee arthroplasty on 7/22/2020.  History of peptic ulcer disease in the past with EGD done as an outpatient in August 2019 which showed PUD with no active bleeding.  This acute anemia could be from surgery or  dilutional with less likely GI loss with normal BUN and no bowel movement last 4 days and no melena.  I discussed the case with GI Dr. Dunn.  We will start IV PPI.  Will monitor H&H closely.  Transfuse with PRBC if hemoglobin drops below 7 g/dL.  If continues to be stable, outpatient work-up will be recommended.  However, if H&H continues to trend down, then full inpatient GI work-up will be granted.  Occult stool blood test ordered.  7/24: H&H trended down slightly.  Discussed with GI Dr. Huffman who still does not think that patient have a GI bleed at this point and who recommended 1 units of PRBC and iron infusion and to follow-up with GI as an outpatient.  7/25: H&H has been stable status post 1 unit of PRBC and IV iron infusion.  Discontinue every 8 hours repeat hemoglobin.    Paroxysmal atrial fibrillation (HCC)- (present on admission)  Assessment & Plan  History of with CHADVASC score 0  On baby aspirin at home which will be held for now due to concern of GI bleed/anemia.    Rheumatoid arthritis (HCC)- (present on admission)  Assessment & Plan  On Xeljanz fusion as an outpatient  Severe with significant right knee osteoarthritic degenerative changes and deformity.  Status post arthroplasty done by orthopedic surgery on 7/22/2020.  Postop care per orthopedic surgery.  Follow-up with rheumatology as an outpatient.       VTE prophylaxis: SCDs.  Anticoagulation as per orthopedic surgery recommendations.

## 2020-07-25 NOTE — PROGRESS NOTES
Pt resting in bed, A&Ox4, VSS, complains of pain in R knee, medicated per MAR. R knee surgical dressing c/d/i, immobilizer in place, 1+ pedal pulse felt, with 4+ strength with dorsiflexion and plantarflexion. Bilat LE edema observed. POC discussed, denies further needs at this time. Safety measures and hourly rounding in place.

## 2020-07-25 NOTE — CARE PLAN
Problem: Communication  Goal: The ability to communicate needs accurately and effectively will improve  Outcome: PROGRESSING AS EXPECTED     Problem: Safety  Goal: Will remain free from falls  Outcome: PROGRESSING AS EXPECTED     Problem: Infection  Goal: Will remain free from infection  Outcome: PROGRESSING AS EXPECTED     Problem: Venous Thromboembolism (VTW)/Deep Vein Thrombosis (DVT) Prevention:  Goal: Patient will participate in Venous Thrombosis (VTE)/Deep Vein Thrombosis (DVT)Prevention Measures  Outcome: PROGRESSING AS EXPECTED     Problem: Bowel/Gastric:  Goal: Will not experience complications related to bowel motility  Outcome: PROGRESSING AS EXPECTED     Problem: Knowledge Deficit  Goal: Knowledge of the prescribed therapeutic regimen will improve  Outcome: PROGRESSING AS EXPECTED     Problem: Pain Management  Goal: Pain level will decrease to patient's comfort goal  Outcome: PROGRESSING AS EXPECTED     Problem: Fluid Volume:  Goal: Will maintain balanced intake and output  Outcome: PROGRESSING AS EXPECTED

## 2020-07-25 NOTE — DISCHARGE PLANNING
[facility] Transitional Care Coordination     Referral from:  Dr. Pedraza  Facesheet indicates:  [facility] Insurance  Potential Rehab Diagnosis:   R Knee Arthroplasty    Chart review indicates patient  Has on going medical management and therapy needs.     D/C support:  Spouse     Physiatry consultation forwarded per protocol.  Anticipate consult Monday 7/27.  TCC will follow.        Thank you for the referral.

## 2020-07-25 NOTE — CARE PLAN
Problem: Communication  Goal: The ability to communicate needs accurately and effectively will improve  Outcome: PROGRESSING AS EXPECTED  Intervention: [place] patient and significant other/support system to call light to alert staff of needs  Note: Pt uses call light for needs appropriately     Problem: Safety  Goal: Will remain free from falls  Outcome: PROGRESSING AS EXPECTED  Intervention: Implement fall precautions  Flowsheets (Taken 7/24/2020 1947)  Environmental Precautions:   Treaded Slipper Socks on Patient   Personal Belongings, Wastebasket, Call Bell etc. in Easy Reach   Transferred to Stronger Side   Report Given to Other Health Care Providers Regarding Fall Risk   Bed in Low Position   Communication Sign for Patients & Families   Mobility Assessed & Appropriate Sign Placed     Problem: Infection  Goal: Will remain free from infection  Outcome: PROGRESSING AS EXPECTED  Intervention: Implement standard precautions and perform hand washing before and after patient contact  Note: No s/s of infection

## 2020-07-25 NOTE — PROGRESS NOTES
S:    44 y/o M s/p Complex Right TKA POD #3    Pain better controlled today.  Still has pelvic pain with ambulation.  Transferred out of bed today but has not yet worked with PT.  NO fever or chills.  NO CP/HA/SOB.  NO N/V/D.  Hospitalist following. Wants to go to rehab facility if possible.    AVSS    H/H 8.3/25.5    Exam:    NAD A& O x3  Breating NOn labored  RLE: +DF/PF/EHL SILT L4/L5/S1, Soft Calf Compartments.  Good DP PUlse.  Prevena with good suction and no output.  Adductor catheter in place.    LLE:  +DF/PF/EHL SILT L4/L5/S1, Soft Calf Compartments.  Good DP PUlse.    Impression    1) S/P Complex Right TKA POD #3  2) Anemia - Stabilizing    Plan:    1) H/H stable - Appreciate hospitalist input  2) PT/OT for mobilization  3) Case management for rehab vs SNF  4) Holding ASA until h/h stabilizes  5) Continue Prevena  6) Pain control

## 2020-07-26 LAB
ALBUMIN SERPL BCP-MCNC: 2.5 G/DL (ref 3.2–4.9)
ALBUMIN/GLOB SERPL: 0.8 G/DL
ALP SERPL-CCNC: 169 U/L (ref 30–99)
ALT SERPL-CCNC: 9 U/L (ref 2–50)
ANION GAP SERPL CALC-SCNC: 8 MMOL/L (ref 7–16)
AST SERPL-CCNC: 16 U/L (ref 12–45)
BASOPHILS # BLD AUTO: 0.5 % (ref 0–1.8)
BASOPHILS # BLD: 0.03 K/UL (ref 0–0.12)
BILIRUB SERPL-MCNC: 0.3 MG/DL (ref 0.1–1.5)
BUN SERPL-MCNC: 10 MG/DL (ref 8–22)
CALCIUM SERPL-MCNC: 8.5 MG/DL (ref 8.4–10.2)
CHLORIDE SERPL-SCNC: 105 MMOL/L (ref 96–112)
CO2 SERPL-SCNC: 26 MMOL/L (ref 20–33)
CREAT SERPL-MCNC: 0.6 MG/DL (ref 0.5–1.4)
EOSINOPHIL # BLD AUTO: 0.16 K/UL (ref 0–0.51)
EOSINOPHIL NFR BLD: 2.7 % (ref 0–6.9)
ERYTHROCYTE [DISTWIDTH] IN BLOOD BY AUTOMATED COUNT: 46.6 FL (ref 35.9–50)
GLOBULIN SER CALC-MCNC: 3.3 G/DL (ref 1.9–3.5)
GLUCOSE SERPL-MCNC: 83 MG/DL (ref 65–99)
HCT VFR BLD AUTO: 27.7 % (ref 42–52)
HGB BLD-MCNC: 8.8 G/DL (ref 14–18)
IMM GRANULOCYTES # BLD AUTO: 0.08 K/UL (ref 0–0.11)
IMM GRANULOCYTES NFR BLD AUTO: 1.4 % (ref 0–0.9)
LYMPHOCYTES # BLD AUTO: 1.44 K/UL (ref 1–4.8)
LYMPHOCYTES NFR BLD: 24.3 % (ref 22–41)
MCH RBC QN AUTO: 29.2 PG (ref 27–33)
MCHC RBC AUTO-ENTMCNC: 31.8 G/DL (ref 33.7–35.3)
MCV RBC AUTO: 92 FL (ref 81.4–97.8)
MONOCYTES # BLD AUTO: 0.45 K/UL (ref 0–0.85)
MONOCYTES NFR BLD AUTO: 7.6 % (ref 0–13.4)
NEUTROPHILS # BLD AUTO: 3.76 K/UL (ref 1.82–7.42)
NEUTROPHILS NFR BLD: 63.5 % (ref 44–72)
NRBC # BLD AUTO: 0 K/UL
NRBC BLD-RTO: 0 /100 WBC
PLATELET # BLD AUTO: 222 K/UL (ref 164–446)
PMV BLD AUTO: 9.7 FL (ref 9–12.9)
POTASSIUM SERPL-SCNC: 4.5 MMOL/L (ref 3.6–5.5)
PROT SERPL-MCNC: 5.8 G/DL (ref 6–8.2)
RBC # BLD AUTO: 3.01 M/UL (ref 4.7–6.1)
SODIUM SERPL-SCNC: 139 MMOL/L (ref 135–145)
WBC # BLD AUTO: 5.9 K/UL (ref 4.8–10.8)

## 2020-07-26 PROCEDURE — 700112 HCHG RX REV CODE 229: Performed by: ORTHOPAEDIC SURGERY

## 2020-07-26 PROCEDURE — 85025 COMPLETE CBC W/AUTO DIFF WBC: CPT

## 2020-07-26 PROCEDURE — 770001 HCHG ROOM/CARE - MED/SURG/GYN PRIV*

## 2020-07-26 PROCEDURE — A9270 NON-COVERED ITEM OR SERVICE: HCPCS | Performed by: FAMILY MEDICINE

## 2020-07-26 PROCEDURE — 80053 COMPREHEN METABOLIC PANEL: CPT

## 2020-07-26 PROCEDURE — 700102 HCHG RX REV CODE 250 W/ 637 OVERRIDE(OP): Performed by: ORTHOPAEDIC SURGERY

## 2020-07-26 PROCEDURE — 700102 HCHG RX REV CODE 250 W/ 637 OVERRIDE(OP): Performed by: FAMILY MEDICINE

## 2020-07-26 PROCEDURE — 700111 HCHG RX REV CODE 636 W/ 250 OVERRIDE (IP): Performed by: PHYSICIAN ASSISTANT

## 2020-07-26 PROCEDURE — 700102 HCHG RX REV CODE 250 W/ 637 OVERRIDE(OP): Performed by: PHYSICIAN ASSISTANT

## 2020-07-26 PROCEDURE — A9270 NON-COVERED ITEM OR SERVICE: HCPCS | Performed by: ORTHOPAEDIC SURGERY

## 2020-07-26 PROCEDURE — 99232 SBSQ HOSP IP/OBS MODERATE 35: CPT | Performed by: FAMILY MEDICINE

## 2020-07-26 PROCEDURE — A9270 NON-COVERED ITEM OR SERVICE: HCPCS | Performed by: PHYSICIAN ASSISTANT

## 2020-07-26 PROCEDURE — 36415 COLL VENOUS BLD VENIPUNCTURE: CPT

## 2020-07-26 RX ADMIN — DOCUSATE SODIUM 100 MG: 100 CAPSULE, LIQUID FILLED ORAL at 17:29

## 2020-07-26 RX ADMIN — HYDROMORPHONE HYDROCHLORIDE 3 MG: 2 INJECTION, SOLUTION INTRAMUSCULAR; INTRAVENOUS; SUBCUTANEOUS at 03:41

## 2020-07-26 RX ADMIN — MORPHINE SULFATE 15 MG: 15 TABLET ORAL at 14:08

## 2020-07-26 RX ADMIN — MORPHINE SULFATE 15 MG: 15 TABLET ORAL at 17:29

## 2020-07-26 RX ADMIN — NICOTINE 7 MG: 7 PATCH TRANSDERMAL at 17:28

## 2020-07-26 RX ADMIN — ACETAMINOPHEN 1000 MG: 500 TABLET, FILM COATED ORAL at 11:17

## 2020-07-26 RX ADMIN — HYDROMORPHONE HYDROCHLORIDE 3 MG: 2 INJECTION, SOLUTION INTRAMUSCULAR; INTRAVENOUS; SUBCUTANEOUS at 13:20

## 2020-07-26 RX ADMIN — ACETAMINOPHEN 1000 MG: 500 TABLET, FILM COATED ORAL at 23:55

## 2020-07-26 RX ADMIN — HYDROMORPHONE HYDROCHLORIDE 3 MG: 2 INJECTION, SOLUTION INTRAMUSCULAR; INTRAVENOUS; SUBCUTANEOUS at 15:37

## 2020-07-26 RX ADMIN — PREGABALIN 150 MG: 75 CAPSULE ORAL at 11:17

## 2020-07-26 RX ADMIN — PREGABALIN 150 MG: 75 CAPSULE ORAL at 05:05

## 2020-07-26 RX ADMIN — ACETAMINOPHEN 1000 MG: 500 TABLET, FILM COATED ORAL at 17:28

## 2020-07-26 RX ADMIN — MORPHINE SULFATE 15 MG: 15 TABLET ORAL at 05:03

## 2020-07-26 RX ADMIN — MORPHINE SULFATE 15 MG: 15 TABLET ORAL at 01:49

## 2020-07-26 RX ADMIN — MORPHINE SULFATE 15 MG: 15 TABLET ORAL at 20:49

## 2020-07-26 RX ADMIN — MORPHINE SULFATE 15 MG: 15 TABLET ORAL at 23:55

## 2020-07-26 RX ADMIN — MAGNESIUM HYDROXIDE 30 ML: 400 SUSPENSION ORAL at 17:29

## 2020-07-26 RX ADMIN — MORPHINE SULFATE 15 MG: 15 TABLET ORAL at 08:07

## 2020-07-26 RX ADMIN — HYDROMORPHONE HYDROCHLORIDE 3 MG: 2 INJECTION, SOLUTION INTRAMUSCULAR; INTRAVENOUS; SUBCUTANEOUS at 19:51

## 2020-07-26 RX ADMIN — PREGABALIN 150 MG: 75 CAPSULE ORAL at 17:28

## 2020-07-26 RX ADMIN — DOCUSATE SODIUM 100 MG: 100 CAPSULE, LIQUID FILLED ORAL at 05:06

## 2020-07-26 RX ADMIN — ACETAMINOPHEN 1000 MG: 500 TABLET, FILM COATED ORAL at 05:06

## 2020-07-26 RX ADMIN — STANDARDIZED SENNA CONCENTRATE AND DOCUSATE SODIUM 1 TABLET: 8.6; 5 TABLET, FILM COATED ORAL at 20:49

## 2020-07-26 RX ADMIN — MORPHINE SULFATE 15 MG: 15 TABLET ORAL at 11:52

## 2020-07-26 RX ADMIN — HYDROMORPHONE HYDROCHLORIDE 3 MG: 2 INJECTION, SOLUTION INTRAMUSCULAR; INTRAVENOUS; SUBCUTANEOUS at 11:16

## 2020-07-26 RX ADMIN — OMEPRAZOLE 20 MG: 20 CAPSULE, DELAYED RELEASE ORAL at 05:02

## 2020-07-26 RX ADMIN — ASPIRIN 81 MG: 81 TABLET, COATED ORAL at 09:46

## 2020-07-26 ASSESSMENT — ENCOUNTER SYMPTOMS
HEADACHES: 0
BLURRED VISION: 0
DIZZINESS: 0
DOUBLE VISION: 0
NAUSEA: 0
COUGH: 0
CHILLS: 0
PALPITATIONS: 0
ORTHOPNEA: 0
WEIGHT LOSS: 0
MYALGIAS: 1
DEPRESSION: 0
HEARTBURN: 0
HEMOPTYSIS: 0
FEVER: 0

## 2020-07-26 ASSESSMENT — LIFESTYLE VARIABLES: SUBSTANCE_ABUSE: 0

## 2020-07-26 NOTE — PROGRESS NOTES
Hospital Medicine Daily Progress Note    Date of Service  7/26/2020    Chief Complaint  45 y.o. male admitted 7/22/2020 with right knee pain.    Hospital Course  45 y.o. male who has complicated past medical history including severe rheumatoid arthritis currently on Xeljanz, history of paroxysmal A. fib on aspirin, and history of peptic ulcer disease has been admitted by orthopedic surgery for elective right knee arthroplasty due to severe osteoarthritic changes and deformity.  Patient had surgeries done on this joint in the past but the joint now becomes very stiff and deformed.  Patient had arthroplasty done on right knee joint on 7/22/2020.  His hemoglobin noted to drop to 7.2 g/dL on 7/22/2020 from 11.5 g/dL on 7/16/2020.  Hospital medicine was consulted for this reason.  In between patient denies any hematochezia or melena.  As mentioned above he has a history of upper GI bleed with endoscopy showing peptic ulcer disease with no active bleeding consistent with NSAIDs gastritis at that time.  Patient denies using any NSAIDs since 7/16/2020.  He only takes baby aspirin for paroxysmal A. fib.  He stated that he did not have a bowel movement over the last 4 days.  Had a blood transfusion in January 2020.  BUN was within normal limits.  Currently hemodynamically stable with occasionally soft blood pressure.  Apparently patient had a GI work-up done in [place] long time ago.  Have not had colonoscopy recently.  Interval Problem Update  Resting in bed comfortably.  Still no bowel movement.  H&H trended down slightly but hemodynamically continue to be stable.  Discussed the case with on-call GI Dr. Barth who does not think that patient has active GI bleed at this point.  Recommended a unit of PRBC and iron transfusion based on iron studies and to follow-up as an outpatient with GI.  Patient denies any chest pain or shortness of breath.  Afebrile last 24 hours.  Hemodynamically and clinically stable.  No issues overnight  per staff.  7/25: Resting in bed comfortably.  Pain is fairly controlled.  H&H has been stable.  Blood pressure stable as well.  No acute distress noted.  No issues overnight per staff.  7/26: Resting in chair comfortably.  H&H has been stable.  No hematochezia or melena.  Restarted on aspirin for A. fib.  Pain is fairly controlled.  No acute distress noted.  No issues overnight per staff.  Consultants/Specialty  Orthopedic surgery (primary team)    Code Status  Full code    Disposition  Per orthopedic surgery  Pending physiatry evaluation.  Review of Systems  Review of Systems   Constitutional: Negative for chills, fever and weight loss.   HENT: Negative for hearing loss and tinnitus.    Eyes: Negative for blurred vision and double vision.   Respiratory: Negative for cough and hemoptysis.    Cardiovascular: Negative for chest pain, palpitations and orthopnea.   Gastrointestinal: Negative for heartburn and nausea.   Genitourinary: Negative for dysuria and urgency.   Musculoskeletal: Positive for joint pain (right knee pain) and myalgias.   Skin: Negative for rash.   Neurological: Negative for dizziness and headaches.   Psychiatric/Behavioral: Negative for depression, substance abuse and suicidal ideas.        Physical Exam  Temp:  [36.6 °C (97.9 °F)-36.8 °C (98.2 °F)] 36.7 °C (98 °F)  Pulse:  [68-89] 89  Resp:  [18] 18  BP: (108-115)/(69-74) 115/73  SpO2:  [90 %-97 %] 95 %    Physical Exam  Constitutional:       General: He is not in acute distress.     Appearance: He is not ill-appearing.   HENT:      Head: Normocephalic and atraumatic.      Nose: No rhinorrhea.      Mouth/Throat:      Pharynx: No oropharyngeal exudate or posterior oropharyngeal erythema.   Eyes:      Extraocular Movements: Extraocular movements intact.      Pupils: Pupils are equal, round, and reactive to light.   Cardiovascular:      Rate and Rhythm: Normal rate and regular rhythm.      Heart sounds: No friction rub. No gallop.    Pulmonary:       Effort: Pulmonary effort is normal. No respiratory distress.      Breath sounds: No stridor.   Abdominal:      General: Abdomen is flat. There is no distension.      Palpations: There is no mass.      Tenderness: There is no abdominal tenderness.   Musculoskeletal:      Comments: Right leg in brace with ice therapy    Skin:     Coloration: Skin is not jaundiced or pale.   Neurological:      General: No focal deficit present.      Mental Status: He is alert and oriented to person, place, and time.      Cranial Nerves: No cranial nerve deficit.   Psychiatric:         Mood and Affect: Mood normal.         Behavior: Behavior normal.         Thought Content: Thought content normal.         Fluids    Intake/Output Summary (Last 24 hours) at 7/26/2020 1312  Last data filed at 7/26/2020 1022  Gross per 24 hour   Intake 1840 ml   Output 3250 ml   Net -1410 ml       Laboratory  Recent Labs     07/24/20  1818 07/25/20  0445 07/26/20  1126   WBC  --  7.5 5.9   RBC  --  2.84* 3.01*   HEMOGLOBIN 7.8* 8.3* 8.8*   HEMATOCRIT 24.4* 25.5* 27.7*   MCV  --  89.8 92.0   MCH  --  29.2 29.2   MCHC  --  32.5* 31.8*   RDW  --  45.6 46.6   PLATELETCT  --  180 222   MPV  --  9.6 9.7     Recent Labs     07/25/20  0445 07/26/20  1126   SODIUM 136 139   POTASSIUM 4.6 4.5   CHLORIDE 103 105   CO2 26 26   GLUCOSE 87 83   BUN 13 10   CREATININE 0.55 0.60   CALCIUM 7.9* 8.5                   Imaging  DX-KNEE 2- RIGHT   Final Result      Post right knee tricompartment constrained arthroplasty.           Assessment/Plan  * Acute blood loss anemia- (present on admission)  Assessment & Plan  Hemoglobin and 7/16/2020 was 11.5 which dropped to 7.2 on 7/23/2020.  Had right knee arthroplasty on 7/22/2020.  History of peptic ulcer disease in the past with EGD done as an outpatient in August 2019 which showed PUD with no active bleeding.  This acute anemia could be from surgery or dilutional with less likely GI loss with normal BUN and no bowel movement last  4 days and no melena.  I discussed the case with GI Dr. Dunn.  We will start IV PPI.  Will monitor H&H closely.  Transfuse with PRBC if hemoglobin drops below 7 g/dL.  If continues to be stable, outpatient work-up will be recommended.  However, if H&H continues to trend down, then full inpatient GI work-up will be granted.  Occult stool blood test ordered.  7/24: H&H trended down slightly.  Discussed with GI Dr. Huffman who still does not think that patient have a GI bleed at this point and who recommended 1 units of PRBC and iron infusion and to follow-up with GI as an outpatient.  7/25: H&H has been stable status post 1 unit of PRBC and IV iron infusion.  Discontinue every 8 hours repeat hemoglobin.  7/26: No concern for GI blood loss.  H&H has been stable.    Paroxysmal atrial fibrillation (HCC)- (present on admission)  Assessment & Plan  History of with CHADVASC score 0  On baby aspirin at home which will be held for now due to concern of GI bleed/anemia.  7/26: H&H has been stable.  We will continue aspirin.    Rheumatoid arthritis (HCC)- (present on admission)  Assessment & Plan  On Xeljanz fusion as an outpatient  Severe with significant right knee osteoarthritic degenerative changes and deformity.  Status post arthroplasty done by orthopedic surgery on 7/22/2020.  Postop care per orthopedic surgery.  Follow-up with rheumatology as an outpatient.  Pending physiatry evaluation for rehab.       VTE prophylaxis: SCDs.  Anticoagulation as per orthopedic surgery recommendations.

## 2020-07-26 NOTE — PROGRESS NOTES
Nerve block-Ropivacaine continuous dose is completed. Dressing and cath in place at this time. Cath needs to be removed at some time tomorrow?. Janett HOFFMAN notified.

## 2020-07-26 NOTE — PROGRESS NOTES
Patient reasonable comfortable.  Did receive 1 unit, has CBC pending today.    Dressing clean and dry.    Quad and leg weak but functioning.    Rehab seems a good option for him the the systemic involvement of rheumatoid disease and multiple comorbidities.    Appreciate Hospitalists help, if they can manage the discharge that would be helpful.  Thanks

## 2020-07-26 NOTE — CARE PLAN
Problem: Safety  Goal: Will remain free from falls  Outcome: PROGRESSING AS EXPECTED  Note: Patient will be free from injury or fall incident- instruction for use of call light given . Call light use for needs encouraged.       Problem: Knowledge Deficit  Goal: Knowledge of disease process/condition, treatment plan, diagnostic tests, and medications will improve  Outcome: PROGRESSING AS EXPECTED  Intervention: Explain information regarding disease process/condition, treatment plan, diagnostic tests, and medications and document in education  Note: Patient/ family member updated on Plan Of Care and Nurses communications with Doctors.

## 2020-07-26 NOTE — PROGRESS NOTES
"Patient seen and examined  Difficulty moving but pain better controlled. Out of bed yesterday with PT.    /74   Pulse 68   Temp 36.8 °C (98.2 °F) (Oral)   Resp 18   Ht 1.803 m (5' 11\")   Wt 100.2 kg (220 lb 14.4 oz)   SpO2 97%     Recent Labs     07/23/20  1011  07/24/20  0930 07/24/20  1818 07/25/20  0445   WBC 10.8  --   --   --  7.5   RBC 2.88*  --   --   --  2.84*   HEMOGLOBIN 8.3*   < > 7.4* 7.8* 8.3*   HEMATOCRIT 26.1*  --   --  24.4* 25.5*   MCV 90.6  --   --   --  89.8   MCH 28.8  --   --   --  29.2   MCHC 31.8*  --   --   --  32.5*   RDW 43.4  --   --   --  45.6   PLATELETCT 231  --   --   --  180   MPV 9.2  --   --   --  9.6    < > = values in this interval not displayed.       No acute distress  Dressing clean dry and intact  Neurovascularly intact  DF/PF intact  HGB stabilized  VSS    POD#4 s/p hinged TKA    Plan:  DVT Prophylaxis- TEDS/SCDs, ASA  Weight Bearing Status-WBAT knee immobilizer  PT/OT  Discharge planning - patient wants to go to SNF/rehab          "

## 2020-07-26 NOTE — PROGRESS NOTES
Paged by Dr. Pires, unable to refill On-Q pump, new orders received to dc On-Q pump from patient.

## 2020-07-26 NOTE — PROGRESS NOTES
Pts pain nerve block- Ropivacaine completed. Patient and his wife requesting if Anesthesiologist can reload or refill medication. Will relay to incoming AM RN to follow-up with MD in am.

## 2020-07-26 NOTE — PROGRESS NOTES
Received report from Olman CUEVAS. Assumed care. This pt is AOx4,   Reports pain in knee, will medicate per MAR. Patient and RN discussed plan of care including pain management, PT/OT, discharge planning: questions answered. Chart reviewed. Call light in place, fall precautions in place, patient educated on importance of calling for assistance. No additional needs at this time.

## 2020-07-27 ENCOUNTER — HOSPITAL ENCOUNTER (INPATIENT)
Facility: REHABILITATION | Age: 45
LOS: 12 days | DRG: 560 | End: 2020-08-08
Attending: PHYSICAL MEDICINE & REHABILITATION | Admitting: PHYSICAL MEDICINE & REHABILITATION
Payer: COMMERCIAL

## 2020-07-27 VITALS
WEIGHT: 220.9 LBS | BODY MASS INDEX: 30.93 KG/M2 | HEART RATE: 70 BPM | HEIGHT: 71 IN | TEMPERATURE: 97.5 F | OXYGEN SATURATION: 98 % | DIASTOLIC BLOOD PRESSURE: 73 MMHG | RESPIRATION RATE: 18 BRPM | SYSTOLIC BLOOD PRESSURE: 109 MMHG

## 2020-07-27 DIAGNOSIS — G47.01 INSOMNIA DUE TO MEDICAL CONDITION: ICD-10-CM

## 2020-07-27 DIAGNOSIS — Z96.651 S/P TOTAL KNEE ARTHROPLASTY, RIGHT: ICD-10-CM

## 2020-07-27 DIAGNOSIS — S32.592S CLOSED FRACTURE OF RAMUS OF LEFT PUBIS, SEQUELA: ICD-10-CM

## 2020-07-27 PROBLEM — D62 ACUTE BLOOD LOSS ANEMIA: Status: RESOLVED | Noted: 2020-07-23 | Resolved: 2020-07-27

## 2020-07-27 PROCEDURE — 700102 HCHG RX REV CODE 250 W/ 637 OVERRIDE(OP): Performed by: PHYSICIAN ASSISTANT

## 2020-07-27 PROCEDURE — A9270 NON-COVERED ITEM OR SERVICE: HCPCS | Performed by: ORTHOPAEDIC SURGERY

## 2020-07-27 PROCEDURE — 770010 HCHG ROOM/CARE - REHAB SEMI PRIVAT*

## 2020-07-27 PROCEDURE — 700102 HCHG RX REV CODE 250 W/ 637 OVERRIDE(OP): Performed by: FAMILY MEDICINE

## 2020-07-27 PROCEDURE — 700102 HCHG RX REV CODE 250 W/ 637 OVERRIDE(OP): Performed by: PHYSICAL MEDICINE & REHABILITATION

## 2020-07-27 PROCEDURE — 94760 N-INVAS EAR/PLS OXIMETRY 1: CPT

## 2020-07-27 PROCEDURE — 700102 HCHG RX REV CODE 250 W/ 637 OVERRIDE(OP): Performed by: ORTHOPAEDIC SURGERY

## 2020-07-27 PROCEDURE — A9270 NON-COVERED ITEM OR SERVICE: HCPCS | Performed by: FAMILY MEDICINE

## 2020-07-27 PROCEDURE — A9270 NON-COVERED ITEM OR SERVICE: HCPCS

## 2020-07-27 PROCEDURE — A9270 NON-COVERED ITEM OR SERVICE: HCPCS | Performed by: PHYSICAL MEDICINE & REHABILITATION

## 2020-07-27 PROCEDURE — 700111 HCHG RX REV CODE 636 W/ 250 OVERRIDE (IP): Performed by: PHYSICIAN ASSISTANT

## 2020-07-27 PROCEDURE — 99223 1ST HOSP IP/OBS HIGH 75: CPT | Performed by: PHYSICAL MEDICINE & REHABILITATION

## 2020-07-27 PROCEDURE — 99239 HOSP IP/OBS DSCHRG MGMT >30: CPT | Performed by: FAMILY MEDICINE

## 2020-07-27 PROCEDURE — A9270 NON-COVERED ITEM OR SERVICE: HCPCS | Performed by: PHYSICIAN ASSISTANT

## 2020-07-27 PROCEDURE — 700102 HCHG RX REV CODE 250 W/ 637 OVERRIDE(OP)

## 2020-07-27 PROCEDURE — 700112 HCHG RX REV CODE 229: Performed by: ORTHOPAEDIC SURGERY

## 2020-07-27 RX ORDER — LOSARTAN POTASSIUM 25 MG/1
25 TABLET ORAL DAILY
Status: DISCONTINUED | OUTPATIENT
Start: 2020-07-28 | End: 2020-08-08 | Stop reason: HOSPADM

## 2020-07-27 RX ORDER — BISACODYL 10 MG
10 SUPPOSITORY, RECTAL RECTAL
Status: DISCONTINUED | OUTPATIENT
Start: 2020-07-27 | End: 2020-08-03

## 2020-07-27 RX ORDER — POLYETHYLENE GLYCOL 3350 17 G/17G
17 POWDER, FOR SOLUTION ORAL 2 TIMES DAILY PRN
Status: ON HOLD
Start: 2020-07-27 | End: 2020-08-07

## 2020-07-27 RX ORDER — ONDANSETRON 4 MG/1
4 TABLET, ORALLY DISINTEGRATING ORAL 4 TIMES DAILY PRN
Status: DISCONTINUED | OUTPATIENT
Start: 2020-07-27 | End: 2020-08-08 | Stop reason: HOSPADM

## 2020-07-27 RX ORDER — LOSARTAN POTASSIUM 25 MG/1
25 TABLET ORAL DAILY
Status: CANCELLED | OUTPATIENT
Start: 2020-07-28

## 2020-07-27 RX ORDER — TRAMADOL HYDROCHLORIDE 50 MG/1
50 TABLET ORAL EVERY 4 HOURS PRN
Status: DISCONTINUED | OUTPATIENT
Start: 2020-07-27 | End: 2020-08-08 | Stop reason: HOSPADM

## 2020-07-27 RX ORDER — PSEUDOEPHEDRINE HCL 30 MG
100 TABLET ORAL 2 TIMES DAILY
Qty: 60 CAP
Start: 2020-07-27 | End: 2023-01-01

## 2020-07-27 RX ORDER — ACETAMINOPHEN 325 MG/1
650 TABLET ORAL EVERY 4 HOURS PRN
Status: DISCONTINUED | OUTPATIENT
Start: 2020-07-27 | End: 2020-08-08 | Stop reason: HOSPADM

## 2020-07-27 RX ORDER — ACETAMINOPHEN 500 MG
1000 TABLET ORAL EVERY 6 HOURS
Status: COMPLETED | OUTPATIENT
Start: 2020-07-27 | End: 2020-07-27

## 2020-07-27 RX ORDER — PREGABALIN 150 MG/1
150 CAPSULE ORAL 3 TIMES DAILY
Status: DISCONTINUED | OUTPATIENT
Start: 2020-07-27 | End: 2020-08-08 | Stop reason: HOSPADM

## 2020-07-27 RX ORDER — AMOXICILLIN 250 MG
2 CAPSULE ORAL 2 TIMES DAILY
Status: DISCONTINUED | OUTPATIENT
Start: 2020-07-27 | End: 2020-08-03

## 2020-07-27 RX ORDER — MORPHINE SULFATE 15 MG/1
15 TABLET ORAL EVERY 6 HOURS
Status: DISCONTINUED | OUTPATIENT
Start: 2020-07-27 | End: 2020-08-08 | Stop reason: HOSPADM

## 2020-07-27 RX ORDER — OMEPRAZOLE 20 MG/1
20 CAPSULE, DELAYED RELEASE ORAL DAILY
Status: DISCONTINUED | OUTPATIENT
Start: 2020-07-28 | End: 2020-08-08 | Stop reason: HOSPADM

## 2020-07-27 RX ORDER — TRAZODONE HYDROCHLORIDE 50 MG/1
50 TABLET ORAL
Status: DISCONTINUED | OUTPATIENT
Start: 2020-07-27 | End: 2020-08-08 | Stop reason: HOSPADM

## 2020-07-27 RX ORDER — ZOLPIDEM TARTRATE 5 MG/1
5 TABLET ORAL NIGHTLY PRN
Status: CANCELLED | OUTPATIENT
Start: 2020-07-27

## 2020-07-27 RX ORDER — ALUMINA, MAGNESIA, AND SIMETHICONE 2400; 2400; 240 MG/30ML; MG/30ML; MG/30ML
20 SUSPENSION ORAL
Status: DISCONTINUED | OUTPATIENT
Start: 2020-07-27 | End: 2020-08-08 | Stop reason: HOSPADM

## 2020-07-27 RX ORDER — OMEPRAZOLE 20 MG/1
20 CAPSULE, DELAYED RELEASE ORAL DAILY
Status: CANCELLED | OUTPATIENT
Start: 2020-07-28

## 2020-07-27 RX ORDER — POLYETHYLENE GLYCOL 3350 17 G/17G
1 POWDER, FOR SOLUTION ORAL
Status: DISCONTINUED | OUTPATIENT
Start: 2020-07-27 | End: 2020-08-03

## 2020-07-27 RX ORDER — ACETAMINOPHEN 500 MG
1000 TABLET ORAL EVERY 6 HOURS
Status: CANCELLED | OUTPATIENT
Start: 2020-07-27 | End: 2020-07-27

## 2020-07-27 RX ORDER — MORPHINE SULFATE 15 MG/1
15 TABLET ORAL EVERY 6 HOURS PRN
Status: DISCONTINUED | OUTPATIENT
Start: 2020-07-27 | End: 2020-08-08 | Stop reason: HOSPADM

## 2020-07-27 RX ORDER — HYDROMORPHONE HYDROCHLORIDE 2 MG/1
2 TABLET ORAL ONCE
Status: COMPLETED | OUTPATIENT
Start: 2020-07-28 | End: 2020-07-27

## 2020-07-27 RX ORDER — HYDROMORPHONE HYDROCHLORIDE 2 MG/1
TABLET ORAL
Status: COMPLETED
Start: 2020-07-27 | End: 2020-07-27

## 2020-07-27 RX ORDER — ZOLPIDEM TARTRATE 5 MG/1
5 TABLET ORAL NIGHTLY PRN
Status: DISCONTINUED | OUTPATIENT
Start: 2020-07-27 | End: 2020-08-08 | Stop reason: HOSPADM

## 2020-07-27 RX ORDER — ONDANSETRON 2 MG/ML
4 INJECTION INTRAMUSCULAR; INTRAVENOUS 4 TIMES DAILY PRN
Status: DISCONTINUED | OUTPATIENT
Start: 2020-07-27 | End: 2020-08-08 | Stop reason: HOSPADM

## 2020-07-27 RX ORDER — MORPHINE SULFATE 15 MG/1
15 TABLET ORAL EVERY 6 HOURS PRN
Status: CANCELLED | OUTPATIENT
Start: 2020-07-27

## 2020-07-27 RX ORDER — PREGABALIN 75 MG/1
150 CAPSULE ORAL 3 TIMES DAILY
Status: CANCELLED | OUTPATIENT
Start: 2020-07-27

## 2020-07-27 RX ORDER — MORPHINE SULFATE 15 MG/1
15 TABLET ORAL EVERY 6 HOURS
Status: CANCELLED | OUTPATIENT
Start: 2020-07-27

## 2020-07-27 RX ORDER — ECHINACEA PURPUREA EXTRACT 125 MG
2 TABLET ORAL PRN
Status: DISCONTINUED | OUTPATIENT
Start: 2020-07-27 | End: 2020-08-08 | Stop reason: HOSPADM

## 2020-07-27 RX ORDER — ASPIRIN 81 MG/1
81 TABLET ORAL DAILY
Qty: 30 TAB | Refills: 0 | Status: ON HOLD | OUTPATIENT
Start: 2020-07-28 | End: 2020-08-07 | Stop reason: SDUPTHER

## 2020-07-27 RX ORDER — POLYVINYL ALCOHOL 14 MG/ML
1 SOLUTION/ DROPS OPHTHALMIC PRN
Status: DISCONTINUED | OUTPATIENT
Start: 2020-07-27 | End: 2020-07-30

## 2020-07-27 RX ORDER — HYDRALAZINE HYDROCHLORIDE 25 MG/1
25 TABLET, FILM COATED ORAL EVERY 8 HOURS PRN
Status: DISCONTINUED | OUTPATIENT
Start: 2020-07-27 | End: 2020-08-08 | Stop reason: HOSPADM

## 2020-07-27 RX ADMIN — OMEPRAZOLE 20 MG: 20 CAPSULE, DELAYED RELEASE ORAL at 06:03

## 2020-07-27 RX ADMIN — PREGABALIN 150 MG: 75 CAPSULE ORAL at 11:11

## 2020-07-27 RX ADMIN — HYDROMORPHONE HYDROCHLORIDE 1 MG: 2 INJECTION, SOLUTION INTRAMUSCULAR; INTRAVENOUS; SUBCUTANEOUS at 13:17

## 2020-07-27 RX ADMIN — MORPHINE SULFATE 15 MG: 15 TABLET ORAL at 19:03

## 2020-07-27 RX ADMIN — LOSARTAN POTASSIUM 25 MG: 25 TABLET ORAL at 06:03

## 2020-07-27 RX ADMIN — MORPHINE SULFATE 15 MG: 15 TABLET ORAL at 06:03

## 2020-07-27 RX ADMIN — MORPHINE SULFATE 15 MG: 15 TABLET ORAL at 21:28

## 2020-07-27 RX ADMIN — MORPHINE SULFATE 15 MG: 15 TABLET ORAL at 09:05

## 2020-07-27 RX ADMIN — ASPIRIN 81 MG: 81 TABLET, COATED ORAL at 06:03

## 2020-07-27 RX ADMIN — PREGABALIN 150 MG: 150 CAPSULE ORAL at 21:28

## 2020-07-27 RX ADMIN — HYDROMORPHONE HYDROCHLORIDE 2 MG: 2 TABLET ORAL at 23:56

## 2020-07-27 RX ADMIN — HYDROMORPHONE HYDROCHLORIDE 3 MG: 2 INJECTION, SOLUTION INTRAMUSCULAR; INTRAVENOUS; SUBCUTANEOUS at 06:36

## 2020-07-27 RX ADMIN — DOCUSATE SODIUM 50 MG AND SENNOSIDES 8.6 MG 2 TABLET: 8.6; 5 TABLET, FILM COATED ORAL at 21:29

## 2020-07-27 RX ADMIN — NICOTINE 7 MG: 7 PATCH, EXTENDED RELEASE TRANSDERMAL at 16:02

## 2020-07-27 RX ADMIN — MAGNESIUM HYDROXIDE 30 ML: 400 SUSPENSION ORAL at 07:55

## 2020-07-27 RX ADMIN — HYDROMORPHONE HYDROCHLORIDE 3 MG: 2 INJECTION, SOLUTION INTRAMUSCULAR; INTRAVENOUS; SUBCUTANEOUS at 02:30

## 2020-07-27 RX ADMIN — PREGABALIN 150 MG: 75 CAPSULE ORAL at 06:03

## 2020-07-27 RX ADMIN — MORPHINE SULFATE 15 MG: 15 TABLET ORAL at 13:16

## 2020-07-27 RX ADMIN — HYDROMORPHONE HYDROCHLORIDE 2 MG: 2 INJECTION, SOLUTION INTRAMUSCULAR; INTRAVENOUS; SUBCUTANEOUS at 11:11

## 2020-07-27 RX ADMIN — ACETAMINOPHEN 1000 MG: 500 TABLET, FILM COATED ORAL at 17:19

## 2020-07-27 RX ADMIN — MORPHINE SULFATE 15 MG: 15 TABLET ORAL at 16:03

## 2020-07-27 RX ADMIN — ACETAMINOPHEN 1000 MG: 500 TABLET, FILM COATED ORAL at 11:11

## 2020-07-27 RX ADMIN — ACETAMINOPHEN 1000 MG: 500 TABLET, FILM COATED ORAL at 06:03

## 2020-07-27 RX ADMIN — MORPHINE SULFATE 15 MG: 15 TABLET ORAL at 03:00

## 2020-07-27 RX ADMIN — DOCUSATE SODIUM 100 MG: 100 CAPSULE, LIQUID FILLED ORAL at 06:03

## 2020-07-27 RX ADMIN — HYDROMORPHONE HYDROCHLORIDE 2 MG: 2 INJECTION, SOLUTION INTRAMUSCULAR; INTRAVENOUS; SUBCUTANEOUS at 09:05

## 2020-07-27 RX ADMIN — HYDROMORPHONE HYDROCHLORIDE 2 MG: 2 INJECTION, SOLUTION INTRAMUSCULAR; INTRAVENOUS; SUBCUTANEOUS at 13:16

## 2020-07-27 ASSESSMENT — LIFESTYLE VARIABLES
HAVE PEOPLE ANNOYED YOU BY CRITICIZING YOUR DRINKING: NO
TOTAL SCORE: 0
EVER FELT BAD OR GUILTY ABOUT YOUR DRINKING: NO
TOTAL SCORE: 0
CONSUMPTION TOTAL: INCOMPLETE
TOTAL SCORE: 0
HAVE YOU EVER FELT YOU SHOULD CUT DOWN ON YOUR DRINKING: NO
EVER HAD A DRINK FIRST THING IN THE MORNING TO STEADY YOUR NERVES TO GET RID OF A HANGOVER: NO
ALCOHOL_USE: NO

## 2020-07-27 ASSESSMENT — PATIENT HEALTH QUESTIONNAIRE - PHQ9
1. LITTLE INTEREST OR PLEASURE IN DOING THINGS: NOT AT ALL
SUM OF ALL RESPONSES TO PHQ9 QUESTIONS 1 AND 2: 0
2. FEELING DOWN, DEPRESSED, IRRITABLE, OR HOPELESS: NOT AT ALL
SUM OF ALL RESPONSES TO PHQ9 QUESTIONS 1 AND 2: 0
1. LITTLE INTEREST OR PLEASURE IN DOING THINGS: NOT AT ALL
2. FEELING DOWN, DEPRESSED, IRRITABLE, OR HOPELESS: NOT AT ALL

## 2020-07-27 ASSESSMENT — FIBROSIS 4 INDEX: FIB4 SCORE: 1.06

## 2020-07-27 NOTE — PROGRESS NOTES
1300- Patient dressed and awaiting transportation to transfer to [facility]. No distress noted at this time.

## 2020-07-27 NOTE — DISCHARGE PLANNING
[facility] Transitional Care Coordination     Dr. Loi Pisano will accept  to inpatient rehab.  Transport scheduled 1p today.  Nursing to call report to [phone].  BEATRIS lemus.  Telephone call to patient,   provided update on pending transfer.  TCC will follow to assist as needed with transition to [facility].

## 2020-07-27 NOTE — DISCHARGE PLANNING
Anticipated Discharge Disposition: [facility]    Action: LSW received a call from [place] with [facility] informing LSW that pt has been accepted by [facility], insurance has authorized, and there is a bed available today for pt.      LSW messaged Dr. Pelaez via Voalte to see if pt is medically cleared for transfer to [facility]. Pt showing attending MD, Temo Pires. Progress notes placed by Dr. Pelaez.     Barriers to Discharge: None    Plan: LSW to await update on pt's medical clearance, LSW provide [facility] update when available, LSW to assist as needed       Addendum 1041  Dr. Pelaez informed LSW that he would be working on d/c summary.   LSW [place] Texted [place] with [facility] to provide update.   LSW await transport time confirmation.     Addendum 1056  LSW received [place] Text from [place] with [facility] informing LSW that pt transport setup for 1300.   LSW provided update to Dr. Pelaez and bedside RNCortez.   LSW to collect signatures COBRA.     Addendum 1232  LSW able to collect signature from pt for COBRA. Dr. Pelaez currently in a rapid response. Mehul AGUDELO provided signature for COBRA.   Pt stated he would be calling family to provide update.   LSW provided COBRA form to bedside RNCortez.

## 2020-07-27 NOTE — DISCHARGE INSTRUCTIONS
Discharge Instructions    Discharged to other by medical transportation with escort. Discharged via wheelchair, hospital escort: Yes.  Special equipment needed: Not Applicable    Be sure to schedule a follow-up appointment with your primary care doctor or any specialists as instructed.     Discharge Plan:   Diet Plan: Discussed  Activity Level: Discussed  Confirmed Follow up Appointment: Patient to Call and Schedule Appointment  Confirmed Symptoms Management: Discussed  Medication Reconciliation Updated: Yes    I understand that a diet low in cholesterol, fat, and sodium is recommended for good health. Unless I have been given specific instructions below for another diet, I accept this instruction as my diet prescription.   Other diet: Home Diet    Special Instructions: Discharge instructions for the Orthopedic Patient    Follow up with Primary Care Physician within 2 weeks of discharge to home, regarding:  Review of medications and diagnostic testing.  Surveillance for medical complications.  Workup and treatment of osteoporosis, if appropriate.     -Is this a Hip/Knee/Shoulder Joint Replacement patient? Yes   TOTAL KNEE REPLACEMENT, AFTER-CARE GUIDELINES     These instructions provide you with information on caring for yourself and your knee after surgery. Your health care provider may also give you instructions that are more specific. Your treatment was planned and performed according to current medical practices but problems sometimes occur. Call your health care provider if you have any problems or questions.     WHAT TO EXPECT AFTER THE PROCEDURE   After your procedure, your knee will typically be stiff, sore, and bruised. This will improve over time.     Pain   · Follow your home pain management plan as discussed with your nurse and as directed by your provider.   · It is important to follow any scheduled pain medications for maximal pain relief.   · If prescribed opioid medication, the goal is to use opioids  only as needed and to wean off prescription pain medicine as soon as possible.   · Ice can be used for pain control.   · Put ice in a plastic bag.   · Place a towel between your skin and the bag.   · Leave the ice on for 20 minutes, 2-3 times a day at a minimum.   · Most patients are off the pain pills by 3 weeks. If your pain continues to be severe, follow up with your provider.     Infection   Knee joint infections occur in fewer than 2% of patients. The most common causes of infection following total knee replacement surgery are from bacteria that enter the bloodstream during dental procedures, urinary tract infections, or skin infections. These bacteria can lodge around your knee replacement and cause an infection.   · Keep the incision as clean and dry as possible.   · Always wash your hands before touching your incision.   · Avoid dental care for 3 months after surgery. Your provider may recommend taking a dose of antibiotics an hour prior to any dental procedure. After 2 years, most providers recommend antibiotics only before an extensive procedure. Ask your provider what they recommend.   · Signs and symptoms of infection include low-grade fever, redness, pain, swelling and drainage from your incision. Notify your provider IMMEDIATELY if you develop ANY of these symptoms.     Post op Disturbances   · Bowel Habits - Constipation is extremely common and caused by a combination of anesthesia, lack of mobility, dehydration and pain medicine. Use stool softeners or laxatives if necessary. It is important not to ignore this problem as bowel obstructions can be a serious complication after joint replacement surgery.   · Mood/Energy Level - Many patients experience a lack of energy and endurance for up to 2-3 months after surgery. Some people feel down and can even become depressed. This is likely due to postoperative anemia, change in activity level, lack of sleep, pain medicine and just the emotional reaction to  the surgery itself that is a big disruption in a person’s life. This usually passes. If symptoms persist, follow up with your primary care provider.  · Returning to Work - Your provider will give you specific instructions based on your profession. Generally, if you work a sedentary job requiring little standing or walking, most patients may return within 2-6 weeks. Manual labor jobs involving walking, lifting and standing may take 3-4 months. Your provider’s office can provide a release to part-time or light duty work early on in your recovery and progress you to full duty as able.   · Driving - You can begin driving once cleared by your provider, provided you are no longer taking narcotic pain medication or any other medications that impair driving. Discuss the length of time expected with your provider as returning to driving depends on things such as your vehicle, which knee was replaced (right or left), and knee motion, strength and reflexes returning appropriately.   · Avoiding falls - A fall during the first few weeks after surgery can damage your new knee and may result in a need for further surgery.  throw rugs and tack down loose carpeting. Be aware of floor hazards such as pets, small objects or uneven surfaces. Notify your provider of any falls.   · [employer] Metal Detectors - The sensitivity of metal detectors varies and it is likely that your prosthesis will cause an alarm. Inform the  of your artificial joint.     Diet   · Resume your normal diet as tolerated.   · It is important to achieve a healthy nutritional status by eating a well-balanced diet on a regular basis.   · Your provider may recommend that you take iron and vitamin supplements.   · Continue to drink plenty of fluids.     Shower/Bathing   · You may shower as soon as you get home from the hospital unless otherwise instructed.   · Keep your incision out of water to prevent infection. To keep the incision dry when  showering, cover it with a plastic bag or plastic wrap. If your bandage is waterproof, this may not be necessary. o Pat incision dry if it gets wet. Do not rub. Notify your provider.   · Do not submerge in a bath until cleared by your provider. Your staples must be out and the incision completely healed.     Dressing Change: Only change your dressing if directed by your provider.   · Wash hands.   · Open all dressing change materials.   · Remove old dressing and discard.   · Inspect incision for signs of irritation or infection including redness, increase in clear drainage, yellow/green drainage, odor and surrounding skin hot to touch. Notify your provider if present.   ·  the new dressing by one corner and lay over the incision. Be careful not to touch the inside of the dressing that will lay over the incision.   · Secure in place as instructed. Swelling/Bruising   · Swelling is normal after knee replacement and can involve the thigh, knee, calf and foot.   · Swelling can last from 3-6 months.   · To reduce swelling, elevate your leg higher than your heart while reclining. The first week you are home you should elevate your leg an equal amount of time as you are active.   · The swelling is usually worse after you go home since you are upright for longer periods of time.   · Bruising often does not appear until after you arrive home and can be quite dramatic- appearing purple, black, or green. Bruising is typically not concerning and will subside without any treatment.     Blood Clot Prevention   Your treatment plan includes multiple preventative measures to decrease the risk of blood clots in the legs (DVTs) and the less common, but serious, clots that travel to the lungs (pulmonary emboli). Most patients are at standard risk for them, but people who are at higher risk include those who have had previous clots, a family history of clotting, smoking, diabetes, obesity, advanced age, use estrogen and/or live a  sedentary lifestyle.     · Signs of blood clots in legs include - Swelling in thigh, calf or ankle that does not go down with elevation. Pain, heat and tenderness in calf, back of calf or groin area. NOTE: blood clots can occur in either leg.   · Signs of blood clots in lungs include - Sudden increased shortness of breath, sudden onset of chest pain, and localized chest pain with coughing.   · If you experience any of the above symptoms, notify your provider and seek medical attention immediately.   · You received anticoagulant therapy (blood thinners) in the hospital. Continue the prescribed blood-thinning medication at home, as directed by your provider.   · Your risk for developing a clot continues for up to 2-3 months after surgery. Avoid prolonged sitting and dehydration (long air trips and car trips). If you do take a trip during this time, please get up, move around every 1-1.5 hours, and discuss all travel plans with your provider.     Activity   Once home, stay active. The key is not to overdo it. While you can expect some good days and some bad days, you should notice a gradual improvement and a gradual increase in your endurance over the next 6 to 12 months. Exercise is a critical component of recovery, particularly during the first few weeks after surgery.     · Normal activities of daily living - Expect to resume most within 3 to 6 weeks following surgery. Some pain with activity and at night is common for several weeks after surgery. Walk as much as you like once your doctor gives permission to proceed, but remember that walking is no substitute for the exercises your doctor and physical therapist prescribe. Use a walker, crutches or cane to assist with walking until you can walk smoothly (minimal or no limp) without assistance.   · Physical Therapy Exercises - Follow your home exercise program as instructed by your physical therapist during your hospital stay. Call and set up outpatient physical  therapy appointments per your provider’s recommendations. Physical therapy after the hospital stay focuses on increasing your range of motion, strengthening your muscles and improving your gait/walking pattern. Contact your provider for the referral to outpatient physical therapy if you have not yet received this. -   · Riding a stationary bicycle can help maintain muscle tone and keep your knee flexible. Begin stationary bicycling as directed by your physical therapist or provider.   · Sexual Activity - Your provider can tell you when it safe to resume sexual activity.   · Sleeping Positions - You can safely sleep on your back, on either side, or on your stomach.   · Other Activities - Lower impact activities are preferred. Consult your provider if you have specific questions.     When to Call the Doctor   Call the provider if you experience:   · Fever over 100.5° F   · Increased pain, drainage, redness, odor or heat around the incision area   · Shaking chills   · Increased knee pain with activity and rest   · Increased pain in calf, tenderness or redness above or below the knee   · Increased swelling of calf, ankle, foot   · Sudden increased shortness of breath, sudden onset of chest pain, localized chest pain with coughing   · Incision opening   Or, if there are any questions or concerns about medications or care.     Infection statistic resource:   [email or web address]-joint-[email or web address]-epidemiology-microbiology-[email or web address]-manifestations-and-diagnosis     -Is this patient being discharged with medication to prevent blood clots?  Yes, Aspirin Aspirin, ASA oral tablets  What is this medicine?  ASPIRIN (AS pir in) is a pain reliever. It is used to treat mild pain and fever. This medicine is also used as directed by a doctor to prevent and to treat heart attacks, to prevent strokes and blood clots, and to treat arthritis or inflammation.  This medicine may be used for other purposes; ask your  health care provider or pharmacist if you have questions.  COMMON BRAND NAME(S): Aspir-Low, Aspir-Ariella, Aspirtab, Feroz Advanced Aspirin, Feroz Aspirin, Feroz Aspirin Extra Strength, Feroz Aspirin Plus, Feroz Extra Strength, Feroz Extra Strength Plus, Feroz Genuine Aspirin, Feroz Womens Aspirin, Bufferin, Bufferin Extra Strength, Bufferin Low Dose  What should I tell my health care provider before I take this medicine?  They need to know if you have any of these conditions:  · anemia  · asthma  · bleeding problems  · child with chickenpox, the flu, or other viral infection  · diabetes  · gout  · if you frequently drink alcohol containing drinks  · kidney disease  · liver disease  · low level of vitamin K  · lupus  · smoke tobacco  · stomach ulcers or other problems  · an unusual or allergic reaction to aspirin, tartrazine dye, other medicines, dyes, or preservatives  · pregnant or trying to get pregnant  · breast-feeding  How should I use this medicine?  Take this medicine by mouth with a glass of water. Follow the directions on the package or prescription label. You can take this medicine with or without food. If it upsets your stomach, take it with food. Do not take your medicine more often than directed.  Talk to your pediatrician regarding the use of this medicine in children. While this drug may be prescribed for children as young as 12 years of age for selected conditions, precautions do apply. Children and teenagers should not use this medicine to treat chicken pox or flu symptoms unless directed by a doctor.  Patients over 65 years old may have a stronger reaction and need a smaller dose.  Overdosage: If you think you have taken too much of this medicine contact a poison control center or emergency room at once.  NOTE: This medicine is only for you. Do not share this medicine with others.  What if I miss a dose?  If you are taking this medicine on a regular schedule and miss a dose, take it as soon as you  can. If it is almost time for your next dose, take only that dose. Do not take double or extra doses.  What may interact with this medicine?  Do not take this medicine with any of the following medications:  · cidofovir  · ketorolac  · probenecid  This medicine may also interact with the following medications:  · alcohol  · alendronate  · bismuth subsalicylate  · flavocoxid  · herbal supplements like feverfew, garlic, adelita, ginkgo biloba, horse chestnut  · medicines for diabetes or glaucoma like acetazolamide, methazolamide  · medicines for gout  · medicines that treat or prevent blood clots like enoxaparin, heparin, ticlopidine, warfarin  · other aspirin and aspirin-like medicines  · NSAIDs, medicines for pain and inflammation, like ibuprofen or naproxen  · pemetrexed  · sulfinpyrazone  · varicella live vaccine  This list may not describe all possible interactions. Give your health care provider a list of all the medicines, herbs, non-prescription drugs, or dietary supplements you use. Also tell them if you smoke, drink alcohol, or use illegal drugs. Some items may interact with your medicine.  What should I watch for while using this medicine?  If you are treating yourself for pain, tell your doctor or health care professional if the pain lasts more than 10 days, if it gets worse, or if there is a new or different kind of pain. Tell your doctor if you see redness or swelling. Also, check with your doctor if you have a fever that lasts for more than 3 days. Only take this medicine to prevent heart attacks or blood clotting if prescribed by your doctor or health care professional.  Do not take aspirin or aspirin-like medicines with this medicine. Too much aspirin can be dangerous. Always read the labels carefully.  This medicine can irritate your stomach or cause bleeding problems. Do not smoke cigarettes or drink alcohol while taking this medicine. Do not lie down for 30 minutes after taking this medicine to  prevent irritation to your throat.  If you are scheduled for any medical or dental procedure, tell your healthcare provider that you are taking this medicine. You may need to stop taking this medicine before the procedure.  This medicine may be used to treat migraines. If you take migraine medicines for 10 or more days a month, your migraines may get worse. Keep a diary of headache days and medicine use. Contact your healthcare professional if your migraine attacks occur more frequently.  What side effects may I notice from receiving this medicine?  Side effects that you should report to your doctor or health care professional as soon as possible:  · allergic reactions like skin rash, itching or hives, swelling of the face, lips, or tongue  · breathing problems  · changes in hearing, ringing in the ears  · confusion  · general ill feeling or flu-like symptoms  · pain on swallowing  · redness, blistering, peeling or loosening of the skin, including inside the mouth or nose  · signs and symptoms of bleeding such as bloody or black, tarry stools; red or dark-brown urine; spitting up blood or brown material that looks like coffee grounds; red spots on the skin; unusual bruising or bleeding from the eye, gums, or nose  · trouble passing urine or change in the amount of urine  · unusually weak or tired  · yellowing of the eyes or skin  Side effects that usually do not require medical attention (report to your doctor or health care professional if they continue or are bothersome):  · diarrhea or constipation  · headache  · nausea, vomiting  · stomach gas, heartburn  This list may not describe all possible side effects. Call your doctor for medical advice about side effects. You may report side effects to FDA at 4-354-FDA-6260.  Where should I keep my medicine?  Keep out of the reach of children.  Store at room temperature between 15 and 30 degrees C (59 and 86 degrees F). Protect from heat and moisture. Do not use this  medicine if it has a strong vinegar smell. Throw away any unused medicine after the expiration date.  NOTE: This sheet is a summary. It may not cover all possible information. If you have questions about this medicine, talk to your doctor, pharmacist, or health care provider.  © 2020 [employer] (2018-01-30 10:42:13)      · Is patient discharged on Warfarin / Coumadin?   No     Depression / Suicide Risk    As you are discharged from this [facility] facility, it is important to learn how to keep safe from harming yourself.    Recognize the warning signs:  · Abrupt changes in personality, positive or negative- including increase in energy   · Giving away possessions  · Change in eating patterns- significant weight changes-  positive or negative  · Change in sleeping patterns- unable to sleep or sleeping all the time   · Unwillingness or inability to communicate  · Depression  · Unusual sadness, discouragement and loneliness  · Talk of wanting to die  · Neglect of personal appearance   · Rebelliousness- reckless behavior  · Withdrawal from people/activities they love  · Confusion- inability to concentrate     If you or a loved one observes any of these behaviors or has concerns about self-harm, here's what you can do:  · Talk about it- your feelings and reasons for harming yourself  · Remove any means that you might use to hurt yourself (examples: pills, rope, extension cords, firearm)  · Get professional help from the community (Mental Health, Substance Abuse, psychological counseling)  · Do not be alone:Call your Safe Contact- someone whom you trust who will be there for you.  · Call your local CRISIS HOTLINE 216-7749 or 067-261-6712  · Call your local Children's Mobile Crisis Response Team [place] (285) 642-5358 or www.[employer]  · Call the toll free National Suicide Prevention Hotlines   · National Suicide Prevention Lifeline 848-622-RRMZ (7963)  · National Hope Line Network 800-SUICIDE  (289-6139)

## 2020-07-27 NOTE — CARE PLAN
Problem: Communication  Goal: The ability to communicate needs accurately and effectively will improve  Outcome: PROGRESSING AS EXPECTED     Problem: Safety  Goal: Will remain free from falls  Outcome: PROGRESSING AS EXPECTED     Problem: Knowledge Deficit  Goal: Knowledge of disease process/condition, treatment plan, diagnostic tests, and medications will improve  Outcome: PROGRESSING AS EXPECTED     Problem: Pain Management  Goal: Pain level will decrease to patient's comfort goal  Outcome: PROGRESSING AS EXPECTED

## 2020-07-27 NOTE — CONSULTS
Medical chart review completed.     Patient is a 45 y.o. male with a past medical history significant for BPD, GERD, MONICA, A fib and severe RA admitted [facility] on 7/22/2020 11:13 AM with scheduled knee procedure.  Per report he had severe valgus deformity requiring right knee replacement. Patient underwent procedure on 7/22/20 with Dr. Pires with EBL of 250 mL. Patient was made WBAT with right knee in immobilizer. Post-operatively patient's Hgb dropped to 7.2 without signs or symptoms. Patient had a history of GI bleed. Patient's vitals have remained stable.  Patient has been transitioned off of PCA to scheduled morphine.  Most recent Hgb was 8.8 on 7/26/20.     Patient was last evaluated by OT on 7/25/20 and was modA for ADLs. Patient was last evaluated by PT on 7/25/20 and was Adore for mobility. Patient was previously living in a 1 story home with 2 steps to enter.     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   • Hemorrhagic disorder (HCC)     hx of bleeding ulcers   • History of bleeding ulcers 2/12/2015   • Insomnia 5/21/2009   • Migraine 5/21/2009   • Obesity, morbid (HCC) 5/21/2009   • Osteomyelitis ([facility]) 10/7/2019   • Pain 09/2019    Chronic knee and back pain   • Pubic ramus fracture ([facility]) 9/28/2019   • Rheumatoid arteritis ([facility]) 09/2019    knee, feet   • Sleep apnea 5/21/2009    Did not tolerate cpap, does not use it   • Snoring        PSH:  Past Surgical History:   Procedure Laterality Date   • TENDON TRANSFER Right  1/22/2020    Procedure: RIGHT DISTAL ULNA RESECTION AND EXTENSOR TENDON TRANSFER;  Surgeon: Marine Rushing M.D.;  Location: SURGERY [facility];  Service: Orthopedics   • IRRIGATION & DEBRIDEMENT ORTHO Left 10/9/2019    Procedure: IRRIGATION AND DEBRIDEMENT, WOUND - PELVIC OSTEOMYELITIS;  Surgeon: You Olivares M.D.;  Location: SURGERY [facility];  Service: Orthopedics   • OTHER SURGICAL PROCEDURE  2019    osteomelitis of pelvis cleaned   • GASTRIC BYPASS LAPAROSCOPIC  2000    Lucila en y   • CHOLECYSTECTOMY  2000   • OTHER SURGICAL PROCEDURE      repair of broken penis       FAMILY HISTORY:  Family History   Problem Relation Age of Onset   • Hypertension Mother    • Arthritis Mother    • Depression Mother    • Cancer Father         bladder   • Schizophrenia Father    • Cancer Maternal Grandmother         breast   • Heart Disease Maternal Grandmother    • Psychiatric Illness Other    • Stroke Maternal Aunt    • Other Neg Hx         no connective tissue disorders or known aortic disease       MEDICATIONS:  Current Facility-Administered Medications   Medication Dose   • aspirin EC (ECOTRIN) tablet 81 mg  81 mg   • omeprazole (PRILOSEC) capsule 20 mg  20 mg   • HYDROmorphone (DILAUDID) injection 2-3 mg  2-3 mg   • morphine (MS IR) tablet 15 mg  15 mg   • losartan (COZAAR) tablet 25 mg  25 mg   • nicotine (NICODERM) 7 MG/24HR 7 mg  1 Patch   • pregabalin (LYRICA) capsule 150 mg  150 mg   • Pharmacy Consult Request ...Pain Management Review 1 Each  1 Each   • ondansetron (ZOFRAN) syringe/vial injection 4 mg  4 mg   • diphenhydrAMINE (BENADRYL) injection 25 mg  25 mg   • scopolamine (TRANSDERM-SCOP) patch 1 Patch  1 Patch   • docusate sodium (COLACE) capsule 100 mg  100 mg   • senna-docusate (PERICOLACE or SENOKOT S) 8.6-50 MG per tablet 1 Tab  1 Tab   • senna-docusate (PERICOLACE or SENOKOT S) 8.6-50 MG per tablet 1 Tab  1 Tab   • polyethylene glycol/lytes (MIRALAX) PACKET 1 Packet  1 Packet   •  magnesium hydroxide (MILK OF MAGNESIA) suspension 30 mL  30 mL   • bisacodyl (DULCOLAX) suppository 10 mg  10 mg   • fleet enema 133 mL  1 Each   • acetaminophen (TYLENOL) tablet 1,000 mg  1,000 mg   • zolpidem (AMBIEN) tablet 5 mg  5 mg   • morphine (MS IR) tablet 15 mg  15 mg       ALLERGIES:  Benadryl [altaryl]; Nsaids; Phenergan [promethazine hcl]; and Penicillins    PSYCHOSOCIAL HISTORY:  Living Site:  Home  Living With:  family  Caregiver's availability:  Not Applicable  Number of stairs:  2  Substance use history:  Unknown      The patient presents functional deficits in mobility and self-care, and Minimal  de-conditioning. Pre-morbidly, this patient lived in a single level home with Two steps to enter,with family  The patient was evaluated by acute care Physical Therapy and Occupational Therapy; currently requiring minimal assistance for mobility and moderate assistance for ADLs The patient's current diet is Regular with Thin liquids.     Patient with scheduled knee replacement with severe post-operative anemia. The patient is   a Good candidate for an acute inpatient rehabilitation program with a coordinated program of care at an intensity and frequency not available at a lower level of care.     Note: This recommendation requires that patient has at least CGA/Minimal Assistance needs in at least two therapy disciplines.  If patient progresses to no longer need CGA/Lisa with at least two therapy disciplines they may be more appropriate for Skilled nursing facility versus home with home health.      This recommendation is substantiated by the patient's current medical condition with intervention and assessment of medical issues requiring an acute level of care for patient's safety and maximum outcome. A coordinated program of care will be provided by an interdisciplinary team including physical therapy, occupational therapy, physiatry, rehab nursing and rehab psychology. Rehab goals include improved  mobility, self-care management, strength and conditioning/endurance, pain management, bowel and bladder management, mood and affect, and safety with independent home management including caregiver training. Estimated length of stay is approximately 7-10 days. Rehab potential: Very Good. Disposition: to pre-morbid independent living setting with supportive care of patient's family. We will continue to follow with you in anticipation of discharge to acute inpatient rehabilitation when medically stable to do so at the discretion of the attending physician. Thank you for allowing us to participate in this patient's care. Please call with any questions regarding this recommendation.    Keaton Pisano M.D.

## 2020-07-27 NOTE — FLOWSHEET NOTE
07/27/20 1436   Events/Summary/Plan   Events/Summary/Plan ox check   Vital Signs   Pulse 87   Respiration 18   Pulse Oximetry 92 %   $ Pulse Oximetry (Spot Check) Yes   Oxygen   O2 Delivery Device None - Room Air

## 2020-07-27 NOTE — PREADMISSION SCREENING NOTE
"  Pre-Admission Screening Form    Patient Information:   Name: Richard Hubbard II     MRN: [id number]       : 1975      Age: 45 y.o.   Gender: male      Race: White [7]       Marital Status:  [2]  Family Contact: Katiana Hubbard Linda        Relationship: Spouse [17]  Mother [8]  Home Phone: [phone]           Cell Phone: [phone]  Advanced Directives: None  Code Status:  FULL  Current Attending Provider: Temo Pires M.D.  Referring Physician: Dr. Pedraza      Physiatrist Consult: Dr. Loi Pisano       Referral Date: 2020  Primary Payor Source:  [facility]  Secondary Payor Source:      Medical Information:   Date of Admission to Acute Care [place]  Room Number: 2216/01  Rehabilitation Diagnosis: 0008.9 - Orthopaedic Disorders: Other Orthopaedic  Immunization History   Administered Date(s) Administered   • Influenza Vaccine Quad Inj (Pf) 2017, 2018, 10/17/2019   • Influenza Vaccine Quad Inj (Preserved) 2015, 2016   • Tdap Vaccine 2020     Allergies   Allergen Reactions   • Benadryl [Altaryl]      \"I get agitated\"   • Nsaids      Bleeding, \"I had a bleeding ulcer\"   • Phenergan [Promethazine Hcl]      \"I get very agitated\"   • Penicillins      \"Had some dizziness\"  Tolerated Unasyn 10/2019     Past Medical History:   Diagnosis Date   • ADD (attention deficit disorder)    • Alcohol abuse    • Allergic rhinitis 2009   • Anemia 2019   • Anxiety 2009   • Arrhythmia     afib when hospitaized for infection   • Back pain 2009   • Bipolar disorder (HCC)    • Bleeding ulcer 2009   • Bleeding ulcer 2009    History of anemia-now on nexium    • Depression 2009   • Eczema 2009   • GERD (gastroesophageal reflux disease)    • Heart murmur     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   • Hemorrhagic disorder (HCC)     hx of bleeding ulcers   • History of bleeding ulcers 2015   • " Insomnia 5/21/2009   • Migraine 5/21/2009   • Obesity, morbid ([facility]) 5/21/2009   • Osteomyelitis ([facility]) 10/7/2019   • Pain 09/2019    Chronic knee and back pain   • Pubic ramus fracture ([facility]) 9/28/2019   • Rheumatoid arteritis ([facility]) 09/2019    knee, feet   • Sleep apnea 5/21/2009    Did not tolerate cpap, does not use it   • Snoring      Past Surgical History:   Procedure Laterality Date   • TENDON TRANSFER Right 1/22/2020    Procedure: RIGHT DISTAL ULNA RESECTION AND EXTENSOR TENDON TRANSFER;  Surgeon: Marine Rushing M.D.;  Location: SURGERY [facility];  Service: Orthopedics   • IRRIGATION & DEBRIDEMENT ORTHO Left 10/9/2019    Procedure: IRRIGATION AND DEBRIDEMENT, WOUND - PELVIC OSTEOMYELITIS;  Surgeon: You Olivares M.D.;  Location: SURGERY [facility];  Service: Orthopedics   • OTHER SURGICAL PROCEDURE  2019    osteomelitis of pelvis cleaned   • GASTRIC BYPASS LAPAROSCOPIC  2000    Lucila en y   • CHOLECYSTECTOMY  2000   • OTHER SURGICAL PROCEDURE      repair of broken penis       History Leading to Admission, Conditions that Caused the Need for Rehab (CMS):     Temo Pires M.D.    Physician    Surgery Orthopedic    OP Report    Unsigned Transcription    Date of Service:  7/22/2020 12:00 AM             (suggestion)    Draft: Not electronically signed.            DATE OF SERVICE:  07/22/2020     LOCATION:  [facility].     PREOPERATIVE DIAGNOSES:  Rheumatoid arthritis, right knee, with severe valgus   deformity and medial collateral ligament insufficiency.     POSTOPERATIVE DIAGNOSES:  Rheumatoid arthritis, right knee, with severe valgus   deformity and medial collateral ligament insufficiency.     PROCEDURE:  Right total knee arthroplasty using Smith and Nephew guided motion   hinge implant.     SURGEON:  Temo Pires MD     ANESTHESIA:  General with postoperatively placed adductor catheter for pain   relief.     ANESTHESIOLOGIST:  Rufino Tolentino MD     ASSISTANT:  Mario Alberto Chapin  FAIZA     ESTIMATED BLOOD LOSS:  250 mL.     COMPONENTS PLACED:  A Smith and Nephew guided hinge size 7 femur with a   cemented 16x120 mm stem, a size 7 tibia with medial and lateral 5 mm augments,   a cemented 14x120 mm stem, a 15 mm poly insert and a 35 mm oval patella.     FINDINGS:  Severe valgus deformity with posterolateral tibial bone loss and   insufficient medial collateral ligament.     COMPLICATIONS:  None.     CPT CODE:  76137-67.     REASON FOR 22 MODIFIER:  The patient had a severe valgus deformity with bone   loss and ligamentous insufficiency that required the use of a primary hinged   implant with cemented stems.  This added complexity of the case that required   a longer incision and it required over 2 times the standard length of time for   regular primary total knee replacement.     In addition, the patient is obese with a very large right leg that added even   more to the difficulty and complexity.     SUMMARY:  The patient was brought to the operating room and anesthesia was   administered.  Vancomycin, Ancef were given IV and right leg prepped and   draped in usual sterile manner.  Leg was exsanguinated, tourniquet inflated,   time-out was called.     We made an anterior incision centered over the patella medially.  It was   longer than standard because we knew we would have a larger exposure.     A standard medial parapatellar arthrotomy was made.  He had a huge amount of   synovial fluid, probably at least half a gallon.  It just was feeling almost   the entire distal thigh.  We took time to do a huge synovectomy up underneath   the quad and in the medial and lateral gutters.     We everted the patella.  It measured ____.  We cut it down to uniform 15 mm.    The 35 button had the best coverage.  We prepared that and resected excess   bone.  Knee was then flexed up.  The tourniquet was released.  For the   majority of the case when tourniquet released, we did have to re-inflate it a   couple  of times just to help control the bleeding as we extended and flexed   the knee, checking implants and doing the releases.     We then turned our attention to the femur with the knee flexed.  We used a   6-degree jig in, we cut the standard 9 mm off the distal femur.  He measured   to a size 7, which was what he templated to.  We initially made all the cuts   were just to size 7 standard Legion implant.     We then reamed up into the canal.  He had a very wide canal where we usually   going to get a 16 mm stem in there.  We centered the size 7 revision jig on   there.  We made our increased posterior condyle cuts of 10 extra mm to drill   off of the hinge and did our bore reamers to allow for the stem.  There was no   need for any offset.  We did set rotation carefully with the initial primary   jig set.     We then exposed the tibia.  We used an intramedullary guide system with the   revision cutting block and cut it down to where it was almost flush with the   posterolateral bone loss.  We sized it to a size 7.  Once again, we did not   need an offset and we were using a 14 mm cemented stem.  We were able to trial   at this point and realized that the 5 mm augments medially and laterally on   the tibia were going to help us, so we had resected a fair amount tibia and he   was extremely ligament lax so with the augments, we had some good options for   stability.  Patella was tracking nicely.  He had no real medial collateral.    We did have to do some posterolateral releases of the IT band and capsule to   allow him to get to full extension with a 15 mm insert.  We did keep the   popliteus tendon intact.  It was not rigid.  It was pretty supple.     At this point, we had released the tourniquet back down.  We stopped all the   bleeders with cautery.  We had soaked with dilute Betadine.  We plugged up the   femoral and tibial canals with cement plugs and using a total of 3 batches of   cement with a total of 3 g of  powdered vancomycin, we cemented the components   into place.  Cement debris was removed.  We felt the 15 mm was our best   implant at that point, so it was secured into position with all the   appropriate care to lock the poly with the posterior screw and to do the   locking pin on the poly into the tibia.     He was nice and stable, had good full extension.  Patella tracked nicely.  We   did soak it with dilute Betadine one more time for a few minutes.     He had a huge dead space where the synovial fluid was and a synovectomy had   been carried out, so we did place a deep Hemovac drain and suprapatellar pouch   around it out laterally, so he would be away from the Prevena dressing.     We placed the knee in flexion and had a ____ with a running #2 Quill.  We   augmented that with a whole another layer of interrupted #1 Vicryl.  The   remainder of the local was injected into the knee joint.  The skin was closed   with multiple layers of 2-0 Vicryl and running 3-0 Monocryl.     At the time of this dictation, adductor catheter was being placed with the   plan was for Prevena dressing, compression wrap and a knee immobilizer.  He   was stable during the procedure and at the time of this dictation.        ____________________________________     MD Shyann SHEN M.D.    Physician    [facility] Medicine    Consults    Signed    Date of Service:  7/23/2020 11:48 AM                     Hospital Medicine Consultation     Date of Service  7/23/2020     Referring Physician  Temo Pires M.D.     Consulting Physician  Shyann Pelaez M.D.     Reason for Consultation  Anemia     History of Presenting Illness  45 y.o. male who has complicated past medical history including severe rheumatoid arthritis currently on Xeljanz, history of paroxysmal A. fib on aspirin, and history of peptic ulcer disease has been admitted by orthopedic surgery for elective right knee arthroplasty due to severe  osteoarthritic changes and deformity.  Patient had surgeries done on this joint in the past but the joint now becomes very stiff and deformed.  Patient had arthroplasty done on right knee joint on 7/22/2020.  His hemoglobin noted to drop to 7.2 g/dL on 7/22/2020 from 11.5 g/dL on 7/16/2020.  Hospital medicine was consulted for this reason.  In between patient denies any hematochezia or melena.  As mentioned above he has a history of upper GI bleed with endoscopy showing peptic ulcer disease with no active bleeding consistent with NSAIDs gastritis at that time.  Patient denies using any NSAIDs since 7/16/2020.  He only takes baby aspirin for paroxysmal A. fib.  He stated that he did not have a bowel movement over the last 4 days.  Had a blood transfusion in January 2020.  BUN was within normal limits.  Currently hemodynamically stable with occasionally soft blood pressure.  Apparently patient had a GI work-up done in [place] long time ago.  Have not had colonoscopy recently.           Assessment/Plan  * Acute blood loss anemia- (present on admission)  Assessment & Plan  Hemoglobin and 7/16/2020 was 11.5 which dropped to 7.2 on 7/23/2020.  Had right knee arthroplasty on 7/22/2020.  History of peptic ulcer disease in the past with EGD done as an outpatient in August 2019 which showed PUD with no active bleeding.  This acute anemia could be from surgery or dilutional with less likely GI loss with normal BUN and no bowel movement last 4 days and no melena.  I discussed the case with GI Dr. Dunn.  We will start IV PPI.  Will monitor H&H closely.  Transfuse with PRBC if hemoglobin drops below 7 g/dL.  If continues to be stable, outpatient work-up will be recommended.  However, if H&H continues to trend down, then full inpatient GI work-up will be granted.  Occult stool blood test ordered.     Paroxysmal atrial fibrillation (HCC)- (present on admission)  Assessment & Plan  History of with CHADVASC score 0  On baby aspirin  at home which will be held for now due to concern of GI bleed.     Rheumatoid arthritis (HCC)- (present on admission)  Assessment & Plan  On Xeljanz fusion as an outpatient  Severe with significant right knee osteoarthritic degenerative changes and deformity.  Status post arthroplasty done by orthopedic surgery on 7/22/2020.  Postop care per orthopedic surgery.  Follow-up with rheumatology as an outpatient.             Keaton Pisano M.D.    Physician    Physical Medicine & Rehab    Consults    Signed    Date of Service:  7/27/2020  9:57 AM             Consult Orders    IP CONSULT FOR PHYSIATRY [[id number]] ordered by Niraj Pedraza II, P.A.-C. at 07/25/20 1231            Expand All Collapse All      Medical chart review completed.      Patient is a 45 y.o. male with a past medical history significant for BPD, GERD, MONICA, A fib and severe RA admitted to [facility] on 7/22/2020 11:13 AM with scheduled knee procedure.  Per report he had severe valgus deformity requiring left knee replacement. Patient underwent procedure on 7/22/20 with Dr. Pires with EBL of 250 mL. Patient was made WBAT with left knee in immobilizer. Post-operatively patient's Hgb dropped to 7.2 without signs or symptoms. Patient had a history of GI bleed. Patient's vitals have remained stable.  Patient has been transitioned off of PCA to scheduled morphine.  Most recent Hgb was 8.8 on 7/26/20.      Patient was last evaluated by OT on 7/25/20 and was modA for ADLs. Patient was last evaluated by PT on 7/25/20 and was Adore for mobility. Patient was previously living in a 1 story home with 2 steps to enter.       ALLERGIES:  Benadryl [altaryl]; Nsaids; Phenergan [promethazine hcl]; and Penicillins     PSYCHOSOCIAL HISTORY:  Living Site:  Home  Living With:  family  Caregiver's availability:  Not Applicable  Number of stairs:  2  Substance use history:  Unknown        The patient presents functional deficits in mobility and self-care,  and Minimal  de-conditioning. Pre-morbidly, this patient lived in a single level home with Two steps to enter,with family  The patient was evaluated by acute care Physical Therapy and Occupational Therapy; currently requiring minimal assistance for mobility and moderate assistance for ADLs The patient's current diet is Regular with Thin liquids.      Patient with scheduled knee replacement with severe post-operative anemia. The patient is   a Good candidate for an acute inpatient rehabilitation program with a coordinated program of care at an intensity and frequency not available at a lower level of care.      Note: This recommendation requires that patient has at least CGA/Minimal Assistance needs in at least two therapy disciplines.  If patient progresses to no longer need CGA/Lisa with at least two therapy disciplines they may be more appropriate for Skilled nursing facility versus home with home health.       This recommendation is substantiated by the patient's current medical condition with intervention and assessment of medical issues requiring an acute level of care for patient's safety and maximum outcome. A coordinated program of care will be provided by an interdisciplinary team including physical therapy, occupational therapy, physiatry, rehab nursing and rehab psychology. Rehab goals include improved mobility, self-care management, strength and conditioning/endurance, pain management, bowel and bladder management, mood and affect, and safety with independent home management including caregiver training. Estimated length of stay is approximately 7-10 days. Rehab potential: Very Good. Disposition: to pre-morbid independent living setting with supportive care of patient's family. We will continue to follow with you in anticipation of discharge to acute inpatient rehabilitation when medically stable to do so at the discretion of the attending physician. Thank you for allowing us to participate in this  patient's care. Please call with any questions regarding this recommendation.     Keaton Pisano M.D.         Co-morbidities: See above  Potential Risk - Complications: Contractures, Deep Vein Thrombosis, Malnutrition, Pain, Perceptual Impairment, Pneumonia, Pressure Ulcer and Infection  Level of Risk: High    Ongoing Medical Management Needed (Medical/Nursing Needs):   Patient Active Problem List    Diagnosis Date Noted   • Acute blood loss anemia 07/23/2020     Priority: High   • Atrial fibrillation with rapid ventricular response ([facility]) 11/05/2019     Priority: High   • Paroxysmal atrial fibrillation ([facility]) 07/23/2020     Priority: Low   • Essential hypertension 09/13/2019     Priority: Low   • Rheumatoid arthritis ([facility]) 09/08/2015     Priority: Low   • Chronic pain of right knee 09/08/2015     Priority: Low   • Cigarette nicotine dependence without complication 07/21/2020   • Positive occult stool blood test 09/17/2019   • Aortic root dilatation ([facility]) 09/13/2019   • Elevated alkaline phosphatase level 09/13/2019   • Aortic stenosis, moderate 09/12/2019   • Iron deficiency anemia, unspecified iron deficiency anemia type 08/15/2019   • Localized edema 08/15/2019   • Aortic ejection murmur 03/08/2019   • Obesity (BMI 35.0-39.9 without comorbidity) ([facility]) 08/09/2018   • Pagophagia 11/13/2017   • Adult ADHD 09/08/2010   • MONICA (obstructive sleep apnea) 03/09/2010   • Mood disorder ([facility]) 05/21/2009   • Anxiety disorder 05/21/2009   • Eczema 05/21/2009     Dain Mullins R.N.    Registered Nurse       Care Plan    Addendum    Date of Service:  7/26/2020  9:10 PM                Addendum              Received report from day shift nurse.   Assumed pt care at approximately 1900.  Pt is A&Ox4, resting comfortably in bed.   Pt on r.a.. No signs of SOB/respiratory distress.   Labs noted, VSS.   Pt c/o 7/10 pain at this moment, medicated per MAR.   Assessment completed, prevena wound vac present on right knee, dressing  "C/D/I, no drainage present in cannister. Immobilizer in place, polar ice in place. CMS intact.    Fall precautions in place.   Bed at lowest position.   Call light and personal belongings within reach. Continue to monitor                      Current Vital Signs:   Temperature: 36.3 °C (97.3 °F) Pulse: 60 Respiration: 20 Blood Pressure: 120/72  Weight: 100.2 kg (220 lb 14.4 oz) Height: 180.3 cm (5' 11\")  Pulse Oximetry: 100 % O2 (LPM): 0      Completed Laboratory Reports:  Recent Labs     07/24/20  1818 07/25/20  0445 07/26/20  1126   WBC  --  7.5 5.9   HEMOGLOBIN 7.8* 8.3* 8.8*   HEMATOCRIT 24.4* 25.5* 27.7*   PLATELETCT  --  180 222   SODIUM  --  136 139   POTASSIUM  --  4.6 4.5   BUN  --  13 10   CREATININE  --  0.55 0.60   ALBUMIN  --  2.2* 2.5*   GLUCOSE  --  87 83     Additional Labs: Not Applicable    Prior Living Situation:   Housing / Facility: 1 Story House  Steps Into Home: 2  Steps In Home: 0  Lives with - Patient's Self Care Capacity: Spouse  Equipment Owned: Front-Wheel Walker    Prior Level of Function / Living Situation:   Physical Therapy: Prior Services: None  Housing / Facility: 1 Story House  Steps Into Home: 2  Steps In Home: 0  Bathroom Set up: Walk In Shower  Equipment Owned: Front-Wheel Walker  Lives with - Patient's Self Care Capacity: Spouse  Bed Mobility: Independent  Transfer Status: Independent  Ambulation: Independent  Distance Ambulation (Feet): (limited community)  Stairs: Independent  Current Level of Function:   Gait Level Of Assist: Supervised  Assistive Device: Front Wheel Walker  Distance (Feet): 50  Deviation: Antalgic  # of Stairs Climbed: 0  Weight Bearing Status: WBAT R  Supine to Sit: Supervised  Sit to Supine: Supervised  Scooting: Supervised  Sit to Stand: Minimal Assist  Bed, Chair, Wheelchair Transfer: Minimal Assist  Transfer Method: Stand Step  Sitting in Chair: >1 hr  Sitting Edge of Bed: 15 min  Standing: 10 min  Occupational Therapy:   Self Feeding: " Independent  Grooming / Hygiene: Independent  Bathing: Independent  Dressing: Independent  Toileting: Independent  Medication Management: Independent  Laundry: Independent  Kitchen Mobility: Independent  Finances: Independent  Home Management: Requires Assist  Shopping: Independent  Prior Level Of Mobility: Independent With Device in Community  Driving / Transportation: Driving Independent  Prior Services: None  Housing / Facility: [address]  Occupation (Pre-Hospital Vocational): Retired Due To Disability  Leisure Interests: Family  Current Level of Function:   Eating: Independent  Upper Body Dressing: Supervision  Lower Body Dressing: Moderate Assist  Skilled Intervention: Verbal Cuing  Speech Language Pathology:      Rehabilitation Prognosis/Potential: Good  Estimated Length of Stay: 7-10 days    Nursing:   Orientation : Oriented x 4  Continent    Scope/Intensity of Services Recommended:  Physical Therapy: 1.5 hr / day  5 days / week. Therapeutic Interventions Required: Maximize Endurance, Mobility, Strength and Safety  Occupational Therapy: 1.5 hr / day 5 days / week. Therapeutic Interventions Required: Maximize Self Care, ADLs, IADLs and Energy Conservation  Rehabilitation Nursin/7. Therapeutic Interventions Required: Monitor Pain, Skin, Vital Signs, Intake and Output, Labs, Safety, Family Training and Surgical incision care; DVT Prophylaxis; ADL's; Infection control; Bowel regimen.   Rehabilitation Physician: 3 - 5 days / week. Therapeutic Interventions Required: Medical Management  Respiratory Care: Consult. Therapeutic Interventions Required: Pulmonary Toileting and Respiratory care per protocol.   Dietician: Consult. Therapeutic Interventions Required: Nutritional evaluation with recommendations to promote optimal health/healing.     He requires 24-hour rehabilitation nursing to manage bowel and bladder function, skin care, surgical incision, nutrition and fluid intake, pulmonary hygiene, pain  control, safety, medication management and patient/family goals. In addition, rehabilitation nursing will reiterate and reinforce therapy skills and equipment use, including ADLs, as well as provide education to the patient and family. Richard Hubbard II is willing to participate in and is able to tolerate the proposed plan of care.    Rehabilitation Goals and Plan (Expected frequency & duration of treatment in the IRF):   Return to the Community, Modified Independent Level of Care and Outpatient Support  Anticipated Date of Rehabilitation Admission: 07/27/2020  Patient/Family oriented IRF level of care/facility/plan: Yes  Patient/Family willing to participate in IRF care/facility/plan: Yes  Patient able to tolerate IRF level of care proposed: Yes  Patient has potential to benefit IRF level of care proposed: Yes  Comments: Not Applicable    Special Needs or Precautions - Medical Necessity:  Safety Concerns/Precautions:  Fall Risk / High Risk for Falls, Balance and Bed / Chair Alarm  Complex Wound Care: Left knee surgical incision care  Pain Management  Weight-bearing Status: Weight-bearing as tolerated left lower extremity  Splints/Braces/Orthotics: Knee Immobilizer     Diet:   DIET ORDERS (From admission to next 24h)     Start     Ordered    07/22/20 1837  Diet Order  ALL MEALS     Question:  Diet:  Answer:  Regular    07/22/20 1836                Anticipated Discharge Destination / Patient/Family Goal:  Destination: Home with Assistance Support System: Spouse  Anticipated home health services: OT, PT and Nursing  Previously used HH service/ provider: Not Applicable  Anticipated DME Needs: To be determined  Outpatient Services: To be determined  Alternative resources to address additional identified needs:   Outpatient follow up with Orthopedic Surgery - Appointment to be arranged  Pre-Screen Completed: 7/27/2020 10:42 AM Nava Morin R.N.

## 2020-07-27 NOTE — DISCHARGE PLANNING
[facility]     Physiatry consult complete.  Dr. Pisano recommending appropriate for inpatient rehab.     Call out to  to discuss IRF referral.   enthusiastically agrees to IRF admission, tells me he is familiar with program from prior admit, no additional rehab questions at this time.      [facility] has authorized IRF.  Anticipate transition to [facility] today medical clearance.  BEATRIS lemus.

## 2020-07-27 NOTE — CARE PLAN
Received report from day shift nurse.   Assumed pt care at approximately 1900.  Pt is A&Ox4, resting comfortably in bed.   Pt on r.a.. No signs of SOB/respiratory distress.   Labs noted, VSS.   Pt c/o 7/10 pain at this moment, medicated per MAR.   Assessment completed, prevena wound vac present on right knee, dressing C/D/I, no drainage present in cannister. Immobilizer in place, polar ice in place. CMS intact.    Fall precautions in place.   Bed at lowest position.   Call light and personal belongings within reach. Continue to monitor         Problem: Knowledge Deficit  Goal: Knowledge of disease process/condition, treatment plan, diagnostic tests, and medications will improve  Outcome: PROGRESSING AS EXPECTED     Problem: Pain Management  Goal: Pain level will decrease to patient's comfort goal  Outcome: PROGRESSING SLOWER THAN EXPECTED

## 2020-07-27 NOTE — PROGRESS NOTES
1330- Patient medicated for complaints of pain prior to discharge to [facility] rehab.

## 2020-07-27 NOTE — DISCHARGE SUMMARY
Discharge Summary    CHIEF COMPLAINT ON ADMISSION  No chief complaint on file.      Reason for Admission  PAIN IN RIGHT KNEE, UNILATERAL LAMONT*     Admission Date  7/22/2020    CODE STATUS  Full Code    HPI & HOSPITAL COURSE  45 y.o. male who has complicated past medical history including severe rheumatoid arthritis currently on Xeljanz, history of paroxysmal A. fib on aspirin, and history of peptic ulcer disease has been admitted by orthopedic surgery for elective right knee arthroplasty due to severe osteoarthritic changes and deformity.  Patient had surgeries done on this joint in the past but the joint now becomes very stiff and deformed.  Patient had arthroplasty done on right knee joint on 7/22/2020.  His hemoglobin noted to drop to 7.2 g/dL on 7/22/2020 from 11.5 g/dL on 7/16/2020.  Hospital medicine was consulted for this reason.  In between patient denies any hematochezia or melena.  As mentioned above he has a history of upper GI bleed with endoscopy showing peptic ulcer disease with no active bleeding consistent with NSAIDs gastritis at that time.  Patient denies using any NSAIDs since 7/16/2020.  He only takes baby aspirin for paroxysmal A. fib.  He stated that he did not have a bowel movement over the last 4 days.  Had a blood transfusion in January 2020.  BUN was within normal limits.  Currently hemodynamically stable with occasionally soft blood pressure.  Apparently patient had a GI work-up done in [place] long time ago.  Have not had colonoscopy recently.  During this hospital stay, there was no concern of GI bleed.  The case was discussed with GI who recommended to transfuse 1 unit of PRBC and replete iron based on iron studies and follow-up with GI as an outpatient.  His H&H continue to be stable.  Was hemodynamically and clinically stable.  No melena or hematochezia.  Physiatry evaluated the patient and recommended acute rehab.  Patient was accepted.  He was transferred to rehab in stable  condition.       Therefore, he is discharged in guarded and stable condition to an inpatient rehabilitation hospital.    The patient met 2-midnight criteria for an inpatient stay at the time of discharge.    Discharge Date  7/27/2020    FOLLOW UP ITEMS POST DISCHARGE  Follow-up with PCP at acute rehab as per recommendations  Follow-up with orthopedic surgery as per recommendations    DISCHARGE DIAGNOSES  Principal Problem (Resolved):    Acute blood loss anemia POA: Yes  Active Problems:    Rheumatoid arthritis (HCC) (Chronic) POA: Yes    Paroxysmal atrial fibrillation (HCC) POA: Yes      FOLLOW UP  Future Appointments   Date Time Provider Department Center   8/5/2020  4:40 PM Gerardo Gutierrez M.D. [facility] None   11/16/2020 11:00 AM Torsten Ivey M.D. [facility] None     Temo Pires M.D.  [address] [facility] [address]  [phone]            MEDICATIONS ON DISCHARGE     Medication List      START taking these medications      Instructions   aspirin 81 MG EC tablet  Start taking on:  July 28, 2020   Take 1 Tab by mouth every day.  Dose:  81 mg     docusate sodium 100 MG Caps   Take 100 mg by mouth 2 Times a Day.  Dose:  100 mg     polyethylene glycol/lytes 17 g Pack  Commonly known as:  MIRALAX   Take 1 Packet by mouth 2 times a day as needed (if sennosides and/or docusate ineffective or not ordered).  Dose:  17 g        CHANGE how you take these medications      Instructions   Naloxone 4 MG/0.1ML Liqd  What changed:    · how much to take  · how to take this  · when to take this  · reasons to take this  Commonly known as:  NARCAN   One spray in one nostril for overdose and call 911.        CONTINUE taking these medications      Instructions   acetaminophen 500 MG Tabs  Commonly known as:  TYLENOL   Take 500-1,000 mg by mouth every 6 hours as needed.  Dose:  500-1,000 mg     losartan 25 MG Tabs  Commonly known as:  COZAAR   Take 1 Tab by mouth every day.  Dose:  25 mg     morphine 15 MG tablet  Commonly known as:  MS  "IR   Take 15 mg by mouth every 6 hours as needed.  Dose:  15 mg     nicotine 7 MG/24HR Pt24  Commonly known as:  NICODERM   Apply 1 Patch to skin as directed every 24 hours.  Dose:  1 Patch     omeprazole 40 MG delayed-release capsule  Commonly known as:  PRILOSEC   Take 1 Cap by mouth every day.  Dose:  40 mg     pregabalin 150 MG Caps  Commonly known as:  LYRICA   Take 150 mg by mouth 3 times a day.  Dose:  150 mg        STOP taking these medications    mupirocin 2 % Oint  Commonly known as:  BACTROBAN            Allergies  Allergies   Allergen Reactions   • Benadryl [Altaryl]      \"I get agitated\"   • Nsaids      Bleeding, \"I had a bleeding ulcer\"   • Phenergan [Promethazine Hcl]      \"I get very agitated\"   • Penicillins      \"Had some dizziness\"  Tolerated Unasyn 10/2019       DIET  No orders of the defined types were placed in this encounter.      ACTIVITY  As tolerated.  Weight bearing as tolerated    CONSULTATIONS  Orthopedic surgery    PROCEDURES  As above    LABORATORY  Lab Results   Component Value Date    SODIUM 139 07/26/2020    POTASSIUM 4.5 07/26/2020    CHLORIDE 105 07/26/2020    CO2 26 07/26/2020    GLUCOSE 83 07/26/2020    BUN 10 07/26/2020    CREATININE 0.60 07/26/2020    CREATININE 0.95 10/19/2010    GLOMRATE >59 10/19/2010        Lab Results   Component Value Date    WBC 5.9 07/26/2020    WBC 8.4 10/19/2010    HEMOGLOBIN 8.8 (L) 07/26/2020    HEMATOCRIT 27.7 (L) 07/26/2020    PLATELETCT 222 07/26/2020        Total time of the discharge process exceeds 40 minutes.  "

## 2020-07-28 LAB
25(OH)D3 SERPL-MCNC: 19 NG/ML (ref 30–100)
ALBUMIN SERPL BCP-MCNC: 2.6 G/DL (ref 3.2–4.9)
ALBUMIN/GLOB SERPL: 0.8 G/DL
ALP SERPL-CCNC: 152 U/L (ref 30–99)
ALT SERPL-CCNC: 10 U/L (ref 2–50)
ANION GAP SERPL CALC-SCNC: 9 MMOL/L (ref 7–16)
AST SERPL-CCNC: 12 U/L (ref 12–45)
BASOPHILS # BLD AUTO: 0.4 % (ref 0–1.8)
BASOPHILS # BLD: 0.03 K/UL (ref 0–0.12)
BILIRUB SERPL-MCNC: 0.5 MG/DL (ref 0.1–1.5)
BUN SERPL-MCNC: 11 MG/DL (ref 8–22)
CALCIUM SERPL-MCNC: 8.5 MG/DL (ref 8.5–10.5)
CHLORIDE SERPL-SCNC: 104 MMOL/L (ref 96–112)
CO2 SERPL-SCNC: 24 MMOL/L (ref 20–33)
CREAT SERPL-MCNC: 0.46 MG/DL (ref 0.5–1.4)
EOSINOPHIL # BLD AUTO: 0.18 K/UL (ref 0–0.51)
EOSINOPHIL NFR BLD: 2.7 % (ref 0–6.9)
ERYTHROCYTE [DISTWIDTH] IN BLOOD BY AUTOMATED COUNT: 45.5 FL (ref 35.9–50)
GLOBULIN SER CALC-MCNC: 3.2 G/DL (ref 1.9–3.5)
GLUCOSE SERPL-MCNC: 92 MG/DL (ref 65–99)
HCT VFR BLD AUTO: 27.3 % (ref 42–52)
HGB BLD-MCNC: 8.6 G/DL (ref 14–18)
IMM GRANULOCYTES # BLD AUTO: 0.09 K/UL (ref 0–0.11)
IMM GRANULOCYTES NFR BLD AUTO: 1.3 % (ref 0–0.9)
LYMPHOCYTES # BLD AUTO: 1.11 K/UL (ref 1–4.8)
LYMPHOCYTES NFR BLD: 16.6 % (ref 22–41)
MCH RBC QN AUTO: 29.3 PG (ref 27–33)
MCHC RBC AUTO-ENTMCNC: 31.5 G/DL (ref 33.7–35.3)
MCV RBC AUTO: 92.9 FL (ref 81.4–97.8)
MONOCYTES # BLD AUTO: 0.47 K/UL (ref 0–0.85)
MONOCYTES NFR BLD AUTO: 7 % (ref 0–13.4)
NEUTROPHILS # BLD AUTO: 4.81 K/UL (ref 1.82–7.42)
NEUTROPHILS NFR BLD: 72 % (ref 44–72)
NRBC # BLD AUTO: 0 K/UL
NRBC BLD-RTO: 0 /100 WBC
PLATELET # BLD AUTO: 255 K/UL (ref 164–446)
PMV BLD AUTO: 9.6 FL (ref 9–12.9)
POTASSIUM SERPL-SCNC: 4.1 MMOL/L (ref 3.6–5.5)
PROT SERPL-MCNC: 5.8 G/DL (ref 6–8.2)
RBC # BLD AUTO: 2.94 M/UL (ref 4.7–6.1)
SODIUM SERPL-SCNC: 137 MMOL/L (ref 135–145)
TSH SERPL DL<=0.005 MIU/L-ACNC: 1.31 UIU/ML (ref 0.38–5.33)
WBC # BLD AUTO: 6.7 K/UL (ref 4.8–10.8)

## 2020-07-28 PROCEDURE — 97535 SELF CARE MNGMENT TRAINING: CPT

## 2020-07-28 PROCEDURE — 85025 COMPLETE CBC W/AUTO DIFF WBC: CPT

## 2020-07-28 PROCEDURE — 99233 SBSQ HOSP IP/OBS HIGH 50: CPT | Performed by: PHYSICAL MEDICINE & REHABILITATION

## 2020-07-28 PROCEDURE — 84443 ASSAY THYROID STIM HORMONE: CPT

## 2020-07-28 PROCEDURE — 80053 COMPREHEN METABOLIC PANEL: CPT

## 2020-07-28 PROCEDURE — 97167 OT EVAL HIGH COMPLEX 60 MIN: CPT

## 2020-07-28 PROCEDURE — 97162 PT EVAL MOD COMPLEX 30 MIN: CPT

## 2020-07-28 PROCEDURE — 770010 HCHG ROOM/CARE - REHAB SEMI PRIVAT*

## 2020-07-28 PROCEDURE — 97530 THERAPEUTIC ACTIVITIES: CPT

## 2020-07-28 PROCEDURE — 36415 COLL VENOUS BLD VENIPUNCTURE: CPT

## 2020-07-28 PROCEDURE — A9270 NON-COVERED ITEM OR SERVICE: HCPCS | Performed by: PHYSICAL MEDICINE & REHABILITATION

## 2020-07-28 PROCEDURE — 82306 VITAMIN D 25 HYDROXY: CPT

## 2020-07-28 PROCEDURE — 700102 HCHG RX REV CODE 250 W/ 637 OVERRIDE(OP): Performed by: PHYSICAL MEDICINE & REHABILITATION

## 2020-07-28 RX ORDER — MORPHINE SULFATE 15 MG/1
15 TABLET, FILM COATED, EXTENDED RELEASE ORAL EVERY 12 HOURS
Status: DISCONTINUED | OUTPATIENT
Start: 2020-07-28 | End: 2020-08-08 | Stop reason: HOSPADM

## 2020-07-28 RX ORDER — VITAMIN B COMPLEX
2000 TABLET ORAL DAILY
Status: DISCONTINUED | OUTPATIENT
Start: 2020-07-29 | End: 2020-08-08 | Stop reason: HOSPADM

## 2020-07-28 RX ORDER — FERROUS SULFATE 325(65) MG
325 TABLET ORAL 2 TIMES DAILY WITH MEALS
Status: DISCONTINUED | OUTPATIENT
Start: 2020-07-28 | End: 2020-08-08 | Stop reason: HOSPADM

## 2020-07-28 RX ORDER — ASCORBIC ACID 500 MG
500 TABLET ORAL 2 TIMES DAILY WITH MEALS
Status: DISCONTINUED | OUTPATIENT
Start: 2020-07-28 | End: 2020-08-08 | Stop reason: HOSPADM

## 2020-07-28 RX ADMIN — ASPIRIN 81 MG: 81 TABLET, COATED ORAL at 08:48

## 2020-07-28 RX ADMIN — MORPHINE SULFATE 15 MG: 15 TABLET ORAL at 13:32

## 2020-07-28 RX ADMIN — MORPHINE SULFATE 15 MG: 15 TABLET ORAL at 04:04

## 2020-07-28 RX ADMIN — MORPHINE SULFATE 15 MG: 15 TABLET ORAL at 14:34

## 2020-07-28 RX ADMIN — PREGABALIN 150 MG: 150 CAPSULE ORAL at 08:48

## 2020-07-28 RX ADMIN — OMEPRAZOLE 20 MG: 20 CAPSULE, DELAYED RELEASE ORAL at 08:47

## 2020-07-28 RX ADMIN — ZOLPIDEM TARTRATE 5 MG: 5 TABLET ORAL at 22:31

## 2020-07-28 RX ADMIN — MORPHINE SULFATE 15 MG: 15 TABLET ORAL at 22:31

## 2020-07-28 RX ADMIN — LOSARTAN POTASSIUM 25 MG: 25 TABLET, FILM COATED ORAL at 08:48

## 2020-07-28 RX ADMIN — DOCUSATE SODIUM 50 MG AND SENNOSIDES 8.6 MG 2 TABLET: 8.6; 5 TABLET, FILM COATED ORAL at 20:56

## 2020-07-28 RX ADMIN — DOCUSATE SODIUM 50 MG AND SENNOSIDES 8.6 MG 2 TABLET: 8.6; 5 TABLET, FILM COATED ORAL at 08:47

## 2020-07-28 RX ADMIN — TRAMADOL HYDROCHLORIDE 50 MG: 50 TABLET, COATED ORAL at 16:55

## 2020-07-28 RX ADMIN — Medication 500 MG: at 16:55

## 2020-07-28 RX ADMIN — MORPHINE SULFATE 15 MG: 15 TABLET ORAL at 07:32

## 2020-07-28 RX ADMIN — MORPHINE SULFATE 15 MG: 15 TABLET, FILM COATED, EXTENDED RELEASE ORAL at 20:56

## 2020-07-28 RX ADMIN — MORPHINE SULFATE 15 MG: 15 TABLET ORAL at 08:49

## 2020-07-28 RX ADMIN — FERROUS SULFATE TAB 325 MG (65 MG ELEMENTAL FE) 325 MG: 325 (65 FE) TAB at 16:55

## 2020-07-28 RX ADMIN — PREGABALIN 150 MG: 150 CAPSULE ORAL at 20:56

## 2020-07-28 RX ADMIN — MORPHINE SULFATE 15 MG: 15 TABLET ORAL at 20:56

## 2020-07-28 RX ADMIN — PREGABALIN 150 MG: 150 CAPSULE ORAL at 14:33

## 2020-07-28 RX ADMIN — MORPHINE SULFATE 15 MG: 15 TABLET, FILM COATED, EXTENDED RELEASE ORAL at 11:26

## 2020-07-28 RX ADMIN — NICOTINE 7 MG: 7 PATCH, EXTENDED RELEASE TRANSDERMAL at 16:55

## 2020-07-28 RX ADMIN — MORPHINE SULFATE 15 MG: 15 TABLET ORAL at 01:23

## 2020-07-28 ASSESSMENT — BRIEF INTERVIEW FOR MENTAL STATUS (BIMS)
INITIAL REPETITION OF BED BLUE SOCK - FIRST ATTEMPT: 3
WHAT YEAR IS IT: CORRECT
ASKED TO RECALL BED: NO, COULD NOT RECALL
WHAT MONTH IS IT: ACCURATE WITHIN 5 DAYS
BIMS SUMMARY SCORE: 13
ASKED TO RECALL SOCK: YES, NO CUE REQUIRED
WHAT DAY OF THE WEEK IS IT: CORRECT
ASKED TO RECALL BLUE: YES, NO CUE REQUIRED

## 2020-07-28 ASSESSMENT — ACTIVITIES OF DAILY LIVING (ADL)
TOILETING: INDEPENDENT
BED_CHAIR_WHEELCHAIR_TRANSFER_DESCRIPTION: ADAPTIVE EQUIPMENT;SUPERVISION FOR SAFETY

## 2020-07-28 ASSESSMENT — GAIT ASSESSMENTS: GAIT LEVEL OF ASSIST: REFUSED

## 2020-07-28 NOTE — PROGRESS NOTES
"Rehab Progress Note     Encounter Date: 7/28/2020    CC: right knee pain, sacral pain, chronic pain    Interval Events (Subjective)  Patient sitting up in bed. He reports therapy is really pushing him today. He reports ongoing worsened pain after getting up and feels like he cannot keep up with the PRN. Discussed trial of long acting but should monitor for constipation. Discussed would get him through the first few days then needs to wean as post-operative pain should resolve in 7-10 days and should be at baseline. Discussed would wean after that. He reports he understands. Denies constipation. Denies NVD. Reviewed AM labs including stable anemia and low Vitamin D.  Discussed [facility] checked iron labs and he was low, will start on Fe supplement but needs to watch constipation.      Objective:  VITAL SIGNS: /72   Pulse 88   Temp 37.1 °C (98.8 °F) (Oral)   Resp 16   Ht 1.803 m (5' 11\")   Wt 101.6 kg (224 lb)   SpO2 96%   BMI 31.24 kg/m²   Gen: NAD  Psych: Mood and affect appropriate  CV: RRR, no edema  Resp: CTAB, no upper airway sounds  Abd: NTND  Neuro: AOx4, 3/5 limited R HF due to pain, knee in immobilizer    Recent Results (from the past 72 hour(s))   CBC WITH DIFFERENTIAL    Collection Time: 07/26/20 11:26 AM   Result Value Ref Range    WBC 5.9 4.8 - 10.8 K/uL    RBC 3.01 (L) 4.70 - 6.10 M/uL    Hemoglobin 8.8 (L) 14.0 - 18.0 g/dL    Hematocrit 27.7 (L) 42.0 - 52.0 %    MCV 92.0 81.4 - 97.8 fL    MCH 29.2 27.0 - 33.0 pg    MCHC 31.8 (L) 33.7 - 35.3 g/dL    RDW 46.6 35.9 - 50.0 fL    Platelet Count 222 164 - 446 K/uL    MPV 9.7 9.0 - 12.9 fL    Neutrophils-Polys 63.50 44.00 - 72.00 %    Lymphocytes 24.30 22.00 - 41.00 %    Monocytes 7.60 0.00 - 13.40 %    Eosinophils 2.70 0.00 - 6.90 %    Basophils 0.50 0.00 - 1.80 %    Immature Granulocytes 1.40 (H) 0.00 - 0.90 %    Nucleated RBC 0.00 /100 WBC    Neutrophils (Absolute) 3.76 1.82 - 7.42 K/uL    Lymphs (Absolute) 1.44 1.00 - 4.80 K/uL    Monos (Absolute) " 0.45 0.00 - 0.85 K/uL    Eos (Absolute) 0.16 0.00 - 0.51 K/uL    Baso (Absolute) 0.03 0.00 - 0.12 K/uL    Immature Granulocytes (abs) 0.08 0.00 - 0.11 K/uL    NRBC (Absolute) 0.00 K/uL   Comp Metabolic Panel    Collection Time: 07/26/20 11:26 AM   Result Value Ref Range    Sodium 139 135 - 145 mmol/L    Potassium 4.5 3.6 - 5.5 mmol/L    Chloride 105 96 - 112 mmol/L    Co2 26 20 - 33 mmol/L    Anion Gap 8.0 7.0 - 16.0    Glucose 83 65 - 99 mg/dL    Bun 10 8 - 22 mg/dL    Creatinine 0.60 0.50 - 1.40 mg/dL    Calcium 8.5 8.4 - 10.2 mg/dL    AST(SGOT) 16 12 - 45 U/L    ALT(SGPT) 9 2 - 50 U/L    Alkaline Phosphatase 169 (H) 30 - 99 U/L    Total Bilirubin 0.3 0.1 - 1.5 mg/dL    Albumin 2.5 (L) 3.2 - 4.9 g/dL    Total Protein 5.8 (L) 6.0 - 8.2 g/dL    Globulin 3.3 1.9 - 3.5 g/dL    A-G Ratio 0.8 g/dL   ESTIMATED GFR    Collection Time: 07/26/20 11:26 AM   Result Value Ref Range    GFR If African American >60 >60 mL/min/1.73 m 2    GFR If Non African American >60 >60 mL/min/1.73 m 2   CBC with Differential    Collection Time: 07/28/20  6:18 AM   Result Value Ref Range    WBC 6.7 4.8 - 10.8 K/uL    RBC 2.94 (L) 4.70 - 6.10 M/uL    Hemoglobin 8.6 (L) 14.0 - 18.0 g/dL    Hematocrit 27.3 (L) 42.0 - 52.0 %    MCV 92.9 81.4 - 97.8 fL    MCH 29.3 27.0 - 33.0 pg    MCHC 31.5 (L) 33.7 - 35.3 g/dL    RDW 45.5 35.9 - 50.0 fL    Platelet Count 255 164 - 446 K/uL    MPV 9.6 9.0 - 12.9 fL    Neutrophils-Polys 72.00 44.00 - 72.00 %    Lymphocytes 16.60 (L) 22.00 - 41.00 %    Monocytes 7.00 0.00 - 13.40 %    Eosinophils 2.70 0.00 - 6.90 %    Basophils 0.40 0.00 - 1.80 %    Immature Granulocytes 1.30 (H) 0.00 - 0.90 %    Nucleated RBC 0.00 /100 WBC    Neutrophils (Absolute) 4.81 1.82 - 7.42 K/uL    Lymphs (Absolute) 1.11 1.00 - 4.80 K/uL    Monos (Absolute) 0.47 0.00 - 0.85 K/uL    Eos (Absolute) 0.18 0.00 - 0.51 K/uL    Baso (Absolute) 0.03 0.00 - 0.12 K/uL    Immature Granulocytes (abs) 0.09 0.00 - 0.11 K/uL    NRBC (Absolute) 0.00 K/uL    Comp Metabolic Panel (CMP)    Collection Time: 07/28/20  6:18 AM   Result Value Ref Range    Sodium 137 135 - 145 mmol/L    Potassium 4.1 3.6 - 5.5 mmol/L    Chloride 104 96 - 112 mmol/L    Co2 24 20 - 33 mmol/L    Anion Gap 9.0 7.0 - 16.0    Glucose 92 65 - 99 mg/dL    Bun 11 8 - 22 mg/dL    Creatinine 0.46 (L) 0.50 - 1.40 mg/dL    Calcium 8.5 8.5 - 10.5 mg/dL    AST(SGOT) 12 12 - 45 U/L    ALT(SGPT) 10 2 - 50 U/L    Alkaline Phosphatase 152 (H) 30 - 99 U/L    Total Bilirubin 0.5 0.1 - 1.5 mg/dL    Albumin 2.6 (L) 3.2 - 4.9 g/dL    Total Protein 5.8 (L) 6.0 - 8.2 g/dL    Globulin 3.2 1.9 - 3.5 g/dL    A-G Ratio 0.8 g/dL   TSH with Reflex to FT4    Collection Time: 07/28/20  6:18 AM   Result Value Ref Range    TSH 1.310 0.380 - 5.330 uIU/mL   Vitamin D, 25-hydroxy (blood)    Collection Time: 07/28/20  6:18 AM   Result Value Ref Range    25-Hydroxy   Vitamin D 25 19 (L) 30 - 100 ng/mL   ESTIMATED GFR    Collection Time: 07/28/20  6:18 AM   Result Value Ref Range    GFR If African American >60 >60 mL/min/1.73 m 2    GFR If Non African American >60 >60 mL/min/1.73 m 2       Current Facility-Administered Medications   Medication Frequency   • morphine ER (MS CONTIN) tablet 15 mg Q12HRS   • Respiratory Therapy Consult Continuous RT   • Pharmacy Consult Request ...Pain Management Review 1 Each PHARMACY TO DOSE   • tramadol (ULTRAM) 50 MG tablet 50 mg Q4HRS PRN   • hydrALAZINE (APRESOLINE) tablet 25 mg Q8HRS PRN   • acetaminophen (TYLENOL) tablet 650 mg Q4HRS PRN   • senna-docusate (PERICOLACE or SENOKOT S) 8.6-50 MG per tablet 2 Tab BID    And   • polyethylene glycol/lytes (MIRALAX) PACKET 1 Packet QDAY PRN    And   • magnesium hydroxide (MILK OF MAGNESIA) suspension 30 mL QDAY PRN    And   • bisacodyl (DULCOLAX) suppository 10 mg QDAY PRN   • artificial tears ophthalmic solution 1 Drop PRN   • benzocaine-menthol (CEPACOL) lozenge 1 Lozenge Q2HRS PRN   • mag hydrox-al hydrox-simeth (MAALOX PLUS ES or MYLANTA DS)  suspension 20 mL Q2HRS PRN   • ondansetron (ZOFRAN ODT) dispertab 4 mg 4X/DAY PRN    Or   • ondansetron (ZOFRAN) syringe/vial injection 4 mg 4X/DAY PRN   • traZODone (DESYREL) tablet 50 mg QHS PRN   • sodium chloride (OCEAN) 0.65 % nasal spray 2 Spray PRN   • zolpidem (AMBIEN) tablet 5 mg HS PRN   • aspirin EC (ECOTRIN) tablet 81 mg DAILY   • losartan (COZAAR) tablet 25 mg DAILY   • morphine (MS IR) tablet 15 mg Q6HRS PRN   • morphine (MS IR) tablet 15 mg Q6HRS   • nicotine (NICODERM) 7 MG/24HR 7 mg Q24HRS   • omeprazole (PRILOSEC) capsule 20 mg DAILY   • pregabalin (LYRICA) capsule 150 mg TID       Orders Placed This Encounter   Procedures   • Diet Order Regular     Standing Status:   Standing     Number of Occurrences:   1     Order Specific Question:   Diet:     Answer:   Regular [1]       Assessment:  Active Hospital Problems    Diagnosis   • *S/P total knee arthroplasty, right   • Acute blood loss anemia   • Atrial fibrillation with rapid ventricular response (HCC)   • Paroxysmal atrial fibrillation (HCC)   • Pubic ramus fracture (HCC)   • Essential hypertension   • Chronic pain of right knee   • Rheumatoid arthritis (HCC)   • Anxiety disorder       Medical Decision Making and Plan:  R Knee replacement - Patient s/p right knee replacement with Dr. Pires for severe valgus deformity and insufficient MCL on 7/22/20. Post-operative course complicated by severe anemia.  -PT and OT for mobility and ADLs  -R knee immobilizer. WBAT.       RA - Multiple joint involvement. Previously on Xeljanz      Anemia - Severe post-operative anemia. Check AM CBC 8.6. Fe panel at [facility] with low Iron  -Start Fe supplement BID and Vitamin C     HTN/ Afib -  Patient on Losartan 256 mg daily.       GERD - PPI on transfer.      Chronic pain - Patient on Tylenol 1000 mg QID, Morphine 15 mg q6h, Lyrica 150 mg TID  -Continue to have break through pain. MS Contin 15 mg BID to have baseline. Discussed about weaning off at 7-10 days  post-operative     Tobacco abuse - Patient will need counseling. Currently on Nicotine patch.      Vitamin D deficiency - 19 on admission. Start 2000 U     DVT Ppx - Patient with severe anemia. Currently on ASA 81 mg    Total time:  36 minutes.  I spent greater than 50% of the time for patient care and coordination on this date, including unit/floor time, and face-to-face time with the patient as per assessment and plan above.  Discussion included pain control, MS Contin, Fe deficiency anemia, start Fe supplement, and Vitamin D.     Keaton Pisano M.D.

## 2020-07-28 NOTE — PROGRESS NOTES
2 RN skin check done with admitting RN and Patsy. Face photo and skin photos documented in media. Appropriate LDAs opened.   Pt with Nam score of 18, RN wound protocol ordered on 7/27, orders current.

## 2020-07-28 NOTE — PROGRESS NOTES
"At 1900 pt co new onset \"numbness in left leg\". Pt stated he was \"unable to move his left picky toe.\" Vitals WNR. Pt given PRN pain medication. Charge nurse and nightshift nurse notified.   "

## 2020-07-28 NOTE — H&P
REHABILITATION HISTORY AND PHYSICAL/POST ADMISSION PHYSICAL EVALUATION    Date of Admission: 7/27/2020  Richard Hubbard II  RH21/01    [facility] Code / Diagnosis to Support: 0008.61 - Orthopaedic Disorders: Status Post Unilateral Knee Replacement  Etiologic Diagnosis: S/P total knee arthroplasty, right    CC: right leg pain, back pain, decreased mobility    HPI:  Patient is a 45 y.o. male with a past medical history significant for BPD, GERD, MONICA, A fib and severe RA admitted to [facility] on 7/22/2020 11:13 AM with scheduled knee procedure.  Per report he had severe valgus deformity requiring right knee replacement. Patient underwent procedure on 7/22/20 with Dr. Pires with EBL of 250 mL. Patient was made WBAT with right knee in immobilizer. Post-operatively patient's Hgb dropped to 7.2 without signs or symptoms. Patient had a history of GI bleed. Patient's vitals have remained stable.  Patient has been transitioned off of PCA to scheduled morphine.  Most recent Hgb was 8.8 on 7/26/20.      Patient was last evaluated by OT on 7/25/20 and was modA for ADLs. Patient was last evaluated by PT on 7/25/20 and was Adore for mobility. Patient was previously living in a 1 story home with 2 steps to enter.    Patient tolerated transfer over. He reports his biggest complaint is his leg swelling. He reports his foot is almost numb completely and he must wiggle his toes to even feel his foot. He reports the pain is controlled on the morphine. He reports he has been trying to use less opiates but has history of chronic opiate use. He reports he watches for constipation and ask for stool softener/laxatives early.  He denies NVD. Denies SOB. Denies chest pain.     REVIEW OF SYSTEMS:     Comprehensive 14 point ROS was reviewed and all were negative except as noted elsewhere in this document.     PMH:  Past Medical History:   Diagnosis Date   • ADD (attention deficit disorder)    • Alcohol abuse    • Allergic rhinitis  5/21/2009   • Anemia 09/2019   • Anxiety 5/21/2009   • Arrhythmia     afib when hospitaized for infection   • Back pain 5/21/2009   • Bipolar disorder ([facility])    • Bleeding ulcer 5/21/2009   • Bleeding ulcer 5/21/2009    History of anemia-now on nexium    • Depression 5/21/2009   • Eczema 5/21/2009   • GERD (gastroesophageal reflux disease)    • Heart murmur     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   • Hemorrhagic disorder ([facility])     hx of bleeding ulcers   • History of bleeding ulcers 2/12/2015   • Insomnia 5/21/2009   • Migraine 5/21/2009   • Obesity, morbid ([facility]) 5/21/2009   • Osteomyelitis ([facility]) 10/7/2019   • Pain 09/2019    Chronic knee and back pain   • Pubic ramus fracture ([facility]) 9/28/2019   • Rheumatoid arteritis ([facility]) 09/2019    knee, feet   • Sleep apnea 5/21/2009    Did not tolerate cpap, does not use it   • Snoring        PSH:  Past Surgical History:   Procedure Laterality Date   • TENDON TRANSFER Right 1/22/2020    Procedure: RIGHT DISTAL ULNA RESECTION AND EXTENSOR TENDON TRANSFER;  Surgeon: Marine Rushing M.D.;  Location: SURGERY [facility];  Service: Orthopedics   • IRRIGATION & DEBRIDEMENT ORTHO Left 10/9/2019    Procedure: IRRIGATION AND DEBRIDEMENT, WOUND - PELVIC OSTEOMYELITIS;  Surgeon: You Olivares M.D.;  Location: SURGERY [facility];  Service: Orthopedics   • OTHER SURGICAL PROCEDURE  2019    osteomelitis of pelvis cleaned   • GASTRIC BYPASS LAPAROSCOPIC  2000    Lucila en y   • CHOLECYSTECTOMY  2000   • OTHER SURGICAL PROCEDURE      repair of broken penis       FAMILY HISTORY:  Family History   Problem Relation Age of Onset   • Hypertension Mother    • Arthritis Mother    • Depression Mother    • Cancer Father         bladder   • Schizophrenia Father    • Cancer Maternal Grandmother         breast   • Heart Disease Maternal Grandmother    • Psychiatric Illness Other    • Stroke Maternal Aunt    • Other Neg Hx         no connective tissue disorders or known aortic disease         MEDICATIONS:  Current Facility-Administered Medications   Medication Dose   • Respiratory Therapy Consult     • Pharmacy Consult Request ...Pain Management Review 1 Each  1 Each   • tramadol (ULTRAM) 50 MG tablet 50 mg  50 mg   • hydrALAZINE (APRESOLINE) tablet 25 mg  25 mg   • acetaminophen (TYLENOL) tablet 650 mg  650 mg   • senna-docusate (PERICOLACE or SENOKOT S) 8.6-50 MG per tablet 2 Tab  2 Tab    And   • polyethylene glycol/lytes (MIRALAX) PACKET 1 Packet  1 Packet    And   • magnesium hydroxide (MILK OF MAGNESIA) suspension 30 mL  30 mL    And   • bisacodyl (DULCOLAX) suppository 10 mg  10 mg   • artificial tears ophthalmic solution 1 Drop  1 Drop   • benzocaine-menthol (CEPACOL) lozenge 1 Lozenge  1 Lozenge   • mag hydrox-al hydrox-simeth (MAALOX PLUS ES or MYLANTA DS) suspension 20 mL  20 mL   • ondansetron (ZOFRAN ODT) dispertab 4 mg  4 mg    Or   • ondansetron (ZOFRAN) syringe/vial injection 4 mg  4 mg   • traZODone (DESYREL) tablet 50 mg  50 mg   • sodium chloride (OCEAN) 0.65 % nasal spray 2 Spray  2 Spray   • zolpidem (AMBIEN) tablet 5 mg  5 mg   • acetaminophen (TYLENOL) tablet 1,000 mg  1,000 mg   • [START ON 7/28/2020] aspirin EC (ECOTRIN) tablet 81 mg  81 mg   • [START ON 7/28/2020] losartan (COZAAR) tablet 25 mg  25 mg   • morphine (MS IR) tablet 15 mg  15 mg   • morphine (MS IR) tablet 15 mg  15 mg   • nicotine (NICODERM) 7 MG/24HR 7 mg  1 Patch   • [START ON 7/28/2020] omeprazole (PRILOSEC) capsule 20 mg  20 mg   • pregabalin (LYRICA) capsule 150 mg  150 mg       ALLERGIES:  Benadryl [altaryl]; Nsaids; Phenergan [promethazine hcl]; and Penicillins    PSYCHOSOCIAL HISTORY:  Housing / Facility: 1 Story House  Steps Into Home: 2  Steps In Home: 0  Lives with - Patient's Self Care Capacity: Spouse  Equipment Owned: Front-Wheel Walker     Prior Level of Function / Living Situation:   Physical Therapy: Prior Services: None  Housing / Facility: 1 Story House  Steps Into Home: 2  Steps In Home:  "0  Bathroom Set up: Walk In Shower  Equipment Owned: Front-Wheel Walker  Lives with - Patient's Self Care Capacity: Spouse  Bed Mobility: Independent  Transfer Status: Independent  Ambulation: Independent  Distance Ambulation (Feet): (limited community)  Stairs: Independent  Current Level of Function:   Gait Level Of Assist: Supervised  Assistive Device: Front Wheel Walker  Distance (Feet): 50  Deviation: Antalgic  # of Stairs Climbed: 0  Weight Bearing Status: WBAT R  Supine to Sit: Supervised  Sit to Supine: Supervised  Scooting: Supervised  Sit to Stand: Minimal Assist  Bed, Chair, Wheelchair Transfer: Minimal Assist  Transfer Method: Stand Step  Sitting in Chair: >1 hr  Sitting Edge of Bed: 15 min  Standing: 10 min  Occupational Therapy:   Self Feeding: Independent  Grooming / Hygiene: Independent  Bathing: Independent  Dressing: Independent  Toileting: Independent  Medication Management: Independent  Laundry: Independent  Kitchen Mobility: Independent  Finances: Independent  Home Management: Requires Assist  Shopping: Independent  Prior Level Of Mobility: Independent With Device in Community  Driving / Transportation: Driving Independent  Prior Services: None  Housing / Facility: [address] [place] [address]  Occupation (Pre-Hospital Vocational): Retired Due To Disability  Leisure Interests: Family  Current Level of Function:   Eating: Independent  Upper Body Dressing: Supervision  Lower Body Dressing: Moderate Assist  Skilled Intervention: Verbal Cuing    CURRENT LEVEL OF FUNCTION:   Same as level of function prior to admission to [facility]    PHYSICAL EXAM:     VITAL SIGNS:   height is 1.803 m (5' 11\") and weight is 101.6 kg (224 lb). His oral temperature is 36.9 °C (98.5 °F). His blood pressure is 103/69 and his pulse is 87. His respiration is 18 and oxygen saturation is 92%.     GENERAL: No apparent distress  HEENT: Normocephalic/atraumatic, EOMI and PERRL  CARDIAC: Regular rate and rhythm, normal S1, " S2   LUNGS: Clear to auscultation   ABDOMINAL: soft and nontender    EXTREMITIES: no contractures, spasticity.   No calf tenderness bilaterally  NEURO:  Mental status:  A&Ox4 (person, place, date, situation) answers questions appropriately  Speech: fluent, no aphasia or dysarthria    CRANIAL NERVES: CN 2-12 intact    Motor:  5/5 BUE  R HF 3/5, knee immobilizer, right foot swollen but wiggling toes  LLE 5/5    Sensory: decreased to right foot  DTRs: 2+ in bilateral biceps    RADIOLOGY:                 XR R Knee 7/22/20  FINDINGS:  There are surgical changes consistent with right knee tricompartment constrained arthroplasty. There are surgical drains present. There is gas and fluid within the soft tissues.     IMPRESSION:     Post right knee tricompartment constrained arthroplasty.             LABS:  Recent Labs     07/25/20  0445 07/26/20  1126   SODIUM 136 139   POTASSIUM 4.6 4.5   CHLORIDE 103 105   CO2 26 26   GLUCOSE 87 83   BUN 13 10   CREATININE 0.55 0.60   CALCIUM 7.9* 8.5     Recent Labs     07/24/20  1818 07/25/20  0445 07/26/20  1126   WBC  --  7.5 5.9   RBC  --  2.84* 3.01*   HEMOGLOBIN 7.8* 8.3* 8.8*   HEMATOCRIT 24.4* 25.5* 27.7*   MCV  --  89.8 92.0   MCH  --  29.2 29.2   MCHC  --  32.5* 31.8*   RDW  --  45.6 46.6   PLATELETCT  --  180 222   MPV  --  9.6 9.7             PRIMARY REHAB DIAGNOSIS:    This patient is a 45 y.o. male admitted for acute inpatient rehabilitation with S/P total knee arthroplasty, right.    IMPAIRMENTS:   ADLs/IADLs  Mobility    SECONDARY DIAGNOSIS/MEDICAL CO-MORBIDITIES AFFECTING FUNCTION:  RA  Anemia  HTN  GERD  Chronic pain       RELEVANT CHANGES SINCE PREADMISSION EVALUATION:    Status unchanged    The patient's rehabilitation potential is Very Good  The patient's medical prognosis is good    PLAN:   Discussion and Recommendations:   1. The patient requires an acute inpatient rehabilitation program with a coordinated program of care at an intensity and frequency not available  at a lower level of care. This recommendation is substantiated by the patient's medical physicians who recommend that the patient's intervention and assessment of medical issues needs to be done at an acute level of care for patient's safety and maximum outcome.   2. A coordinated program of care will be supplied by an interdisciplinary team of physical therapy, occupational therapy, rehab physician, rehab nursing, and, if needed, speech therapy and rehab psychology. Rehab team presents a patient-specific rehabilitation and education program concentrating on prevention of future problems related to accessibility, mobility, skin, bowel, bladder, sexuality, and psychosocial and medical/surgical problems.   3. Need for Rehabilitation Physician: The rehab physician will be evaluating the patient on a multi-weekly basis to help coordinate the program of care. The rehab physician communicates between medical physicians, therapists, and nurses to maximize the patient's potential outcome. Specific areas in which the rehab physician will be providing daily assessment include the following:   A. Assessing the patient's heart rate and blood pressure response (vitals monitoring) to activity and making adjustments in medications or conservative measures as needed.   B. The rehab physician will be assessing the frequency at which the program can be increased to allow the patient to reach optimal functional outcome.   C. The rehab physician will also provide assessments in daily skin care, especially in light of patient's impairments in mobility.   D. The rehab physician will provide special expertise in understanding how to work with functional impairment and recommend appropriate interventions, compensatory techniques, and education that will facilitate the patient's outcome.   4. Rehab R.N.   The rehab RN will be working with patient to carry over in room mobility and activities of daily living when the patient is not in 3  hours of skilled therapy. Rehab nursing will be working in conjunction with rehab physician to address all the medical issues above and continue to assess laboratory work and discuss abnormalities with the treating physicians, assess vitals, and response to activity, and discuss and report abnormalities with the rehab physician. Rehab RN will also continue daily skin care, supervise bladder/bowel program, instruct in medication administration, and ensure patient safety.   5. Rehab Therapy: Therapies to treat at intensity and frequency of (may change after completion of evaluation by all therapeutic disciplines):       PT:  Physical therapy to address mobility, transfer, gait training and evaluation for adaptive equipment needs 1 and 1/2 hour/day at least 5 days/week for the duration of the ELOS (see below)       OT:  Occupational therapy to address ADLs, self-care, home management training, functional mobility/transfers and assistive device evaluation, and community re-integration 1 and 1/2 hour/day at least 5 days/week for the duration of the ELOS (see below).   Medical management / Rehabilitation Issues/ Adverse Potential as part of rehabilitation plan     REHABILITATION ISSUES/ADVERSE POTENTIAL:  1.  R Knee replacement - Patient s/p right knee replacement with Dr. Pires for severe valgus deformity and insufficient MCL on 7/22/20. Post-operative course complicated by severe anemia.. Patient demonstrates functional deficits in strength, balance, coordination, and ADL's. Patient is admitted to [facility] for comprehensive rehabilitation therapy as described below.   Rehabilitation nursing monitors bowel and bladder control, educates on medication administration, co-morbidities and monitors patient safety.      2.  Neurostimulants: None at this time but continue to assess daily for need to initiate should status change.    3.  DVT prophylaxis:  Patient is high risk for bleed. Encourage OOB.  Monitor daily for signs and symptoms of DVT including but not limited to swelling and pain to prevent the development of DVT that may interfere with therapies.    4.  GI prophylaxis:  On prilosec to prevent gastritis/dyspepsia which may interfere with therapies.    5.  Pain: No issues with pain currently / Controlled with APAP/Morphine    6.  Nutrition/Dysphagia: Dietician monitors nutrient intake, recommend supplements prn and provide nutrition education to pt/family to promote optimal nutrition for wound healing/recovery.     7.  Bladder/bowel:  Start bowel and bladder program as described below, to prevent constipation, urinary retention (which may lead to UTI), and urinary incontinence (which will impact upon pt's functional independence).   - Post void bladder scans, I&O cath for PVRs >400  - up to commode after meal     8.  Skin/dermal ulcer prophylaxis: Monitor for new skin conditions with q.2 h. turns as required to prevent the development of skin breakdown.     9.  Cognition/Behavior:  Psychologist Dr. Rich provides adjustment counseling to illness and psychosocial barriers that may be potential barriers to rehabilitation.     10. Respiratory therapy: RT performs O2 management prn, breathing retraining, pulmonary hygiene and bronchospasm management prn to optimize participation in therapies.     MEDICAL CO-MORBIDITIES/ADVERSE POTENTIAL AFFECTING FUNCTION:  R Knee replacement - Patient s/p right knee replacement with Dr. Pires for severe valgus deformity and insufficient MCL on 7/22/20. Post-operative course complicated by severe anemia.  -PT and OT for mobility and ADLs  -R knee immobilizer. WBAT.     RA - Multiple joint involvement. Previously on Xeljanz      Anemia - Severe post-operative anemia. Check AM CBC    HTN/ Afib -  Patient on Losartan 256 mg daily.      GERD - PPI on transfer.      Chronic pain - Patient on Tylenol 1000 mg QID, Morphine 15 mg q6h, Lyrica 150 mg TID  -Continue to monitor for  use    Tobacco abuse - Patient will need counseling. Currently on Nicotine patch.     DVT Ppx - Patient with severe anemia. Currently on ASA 81 mg    I personally performed a complete drug regimen review and no potential clinically significant medication issues were identified.     Pt was seen today for 77 min, and entire time spent in face-to-face contact was >50% in counseling and coordination of care as detailed in A/P above.        GOALS/EXPECTED LEVEL OF FUNCTION BASED ON CURRENT MEDICAL AND FUNCTIONAL STATUS (may change based on patient's medical status and rate of impairment recovery):  Transfers:   Modified Independent  Mobility/Gait:   Modified Independent  ADL's:   Modified Independent    DISPOSITION: Discharge to pre-morbid independent living setting with the supportive care of patient's family.      ELOS: 7-14 days

## 2020-07-28 NOTE — THERAPY
Occupational Therapy  Daily Treatment     Patient Name: Richard Hubbard II  Age:  45 y.o., Sex:  male  Medical Record #: [id number]  Today's Date: 7/28/2020     Precautions  Precautions: Weight Bearing As Tolerated Right Lower Extremity, Fall Risk, Immobilizer Right Lower Extremity         Subjective    Patient in bed upon arrival; agreeable to participate in OT.      Objective       07/28/20 1331   Precautions   Precautions Weight Bearing As Tolerated Right Lower Extremity;Fall Risk;Immobilizer Right Lower Extremity   Functional Level of Assist   Lower Body Dressing Moderate Assist  (for shorts over hips(partial)/standing balance/safety)   Lower Body Dressing Description Set-up of equipment;Supervision for safety;Increased time;Assistive devices  (FWW)   Interdisciplinary Plan of Care Collaboration   Patient Position at End of Therapy In Bed;Call Light within Reach   OT Total Time Spent   OT Individual Total Time Spent (Mins) 30   OT Charge Group   OT Self Care / ADL 2     Patient doffed/donned elastic waist shorts while EOB and standing with FWW; able to thread BLEs through shorts however required min-mod assist to complete pants over hips pursuit.     Completed BIMs assessment to briefly assess cognition. Able to repeat 3/3 words, delayed recall 2/3 words, correctly identified current date.     Assessment    Patient tolerated OT session fair with focus on cog and ADL assessment. 1 LOB episode noted; patient threaded BLEs through shorts at SBA level however when attempting to complete STS with FWW (in preparation for shorts over hips pursuit), patient slipped which nearly caused him to slip off of bed. Able to return to seated EOB position with increased time and min-mod assist. Limited by anxiety, pain, and decreased endurance.     Strengths: Able to follow instructions, Alert and oriented, Independent prior level of function, Effective communication skills, Pleasant and cooperative, Supportive  family    Plan    Shower assessment tomorrow (as patient unable today due to RLE pain). ADL/IADL retraining with AE as needed, standing balance/tolerance, endurance     Occupational Therapy Goals (Active)      There are no active problems.

## 2020-07-28 NOTE — PROGRESS NOTES
Pt arrived at [facility] from [facility] via transport. Dr. Pisano to follow. Initial assessment initiated. Pt oriented to room and facility routine and safety measures; pt education binder provided and discussed. Pt A/O x 4, continent of bowel and bladder. Min assist for transfers. All wounds photographed and documented; photos uploaded to . Pt 8/10 pain in r knee, treated with scheduled pain medication. Pt positioned for comfort in bed. Call light within reach, safety measures in place. Will continue to monitor.

## 2020-07-28 NOTE — CARE PLAN
Problem: Communication  Goal: The ability to communicate needs accurately and effectively will improve  Description: Patient able to verbalize needs.  Will continue to monitor.   Note: Makes needs known to staff.  Encouraged to use call light for staff assist.      Problem: Pain Management  Goal: Pain level will decrease to patient's comfort goal  Note: Complains of pain to right knee.  Medicated with scheduled MS Contin and prn MS Contin, as well as Dilaudid po ordered by on call doctor, Dr. Pascual.  Has polar ice to right knee and immobilizer.  Has min relief from pain.  Will continue to monitor patient.

## 2020-07-28 NOTE — THERAPY
Occupational Therapy   Initial Evaluation     Patient Name: Richard Hubbard II  Age:  45 y.o., Sex:  male  Medical Record #: [id number]  Today's Date: 7/28/2020     Subjective    Patient c/o significant RLE pain; RN notified/administered pain medication.      Objective     07/28/20 0800   Prior Living Situation   Prior Services None;Home-Independent   Housing / Facility 1 Story House   Steps Into Home 2   Rail None   Bathroom Set up Walk In Shower;Shower Chair  (hand held shower head)   Equipment Owned Front-Wheel Walker;Platform Walker;Single Point Cane;Wheelchair;Tub / Shower Seat;Hand Held Shower;Sock Aid;Reacher   Lives with - Patient's Self Care Capacity Spouse;Child Less than 18 Years of Age   Prior Level of ADL Function   Self Feeding Independent   Grooming / Hygiene Independent   Bathing Independent   Dressing Independent   Toileting Independent   Comments Patient reports he was previously independent with all ADLs, with intermittent use of FWW.   Prior Level of IADL Function   Medication Management   (Spouse assists)   Laundry Independent   Kitchen Mobility Independent   Finances   (Spouse handles finances)   Home Management Independent   Shopping Independent   Prior Level Of Mobility   (Independent with intermittent AD use (cane or FWW))   Driving / Transportation Driving Independent   Occupation (Pre-Hospital Vocational) Not Employed   Prior Functioning: Everyday Activities   Self Care Independent   Indoor Mobility (Ambulation) Independent   Stairs Independent   Functional Cognition Independent   Prior Device Use Walker  (and cane )   Vitals   O2 (LPM) 0   O2 Delivery Device None - Room Air   Pain 0 - 10 Group   Location Knee   Location Orientation Right   Pain Rating Scale (NPRS) 7  (RN administered medication )   Description Deep;Shooting   Cognition    Level of Consciousness Alert   Cognitive Pattern Assessment   Cognitive Pattern Assessment Used BIMS   Brief Interview for Mental Status (BIMS)  "  Repetition of Three Words (First Attempt) 3   Temporal Orientation: Year Correct   Temporal Orientation: Month Accurate within 5 days   Temporal Orientation: Day Correct   Recall: \"Sock\" Yes, no cue required   Recall: \"Blue\" Yes, no cue required   Recall: \"Bed\" No, could not recall   BIMS Summary Score 13   Vision Screen   Vision Not tested  (Patient did not report any visual changes)   Passive ROM Upper Body   Passive ROM Upper Body WDL   Comments Grossly intact    Active ROM Upper Body   Active ROM Upper Body  WDL   Comments Grossly intact except mildly impaired in RUE (distally) H/o tendon txfr    Strength Upper Body   Comments UE strength grossly intact except RUE (distal)   Sensation Upper Body   Upper Extremity Sensation  Not Tested   Comments No UE sensory changes reported    Upper Body Muscle Tone   Upper Body Muscle Tone  WDL   Coordination Upper Body   Comments Mildly impaired RUE (history of tendon txfr)   Eating   Assistance Needed Set-up / clean-up   CARE Score 5   Eating Discharge Goal   Discharge Goal Independent   Oral Hygiene   Assistance Needed Set-up / clean-up   CARE Score 5   Oral Hygiene Discharge Goal   Discharge Goal Independent   Shower/Bathe Self   Reason if not Attempted Medical concerns  (Patient UA to participate due to RLE pain )   CARE Score 88   Shower/Bathe Self Discharge Goal   Discharge Goal Independent   Upper Body Dressing   Assistance Needed Set-up / clean-up   CARE Score 5   Upper Body Dressing Discharge Goal   Discharge Goal Independent   Lower Body Dressing   Assistance Needed Physical assistance   Physical Assistance Level 25%-49%   CARE Score 3   Lower Body Dressing Discharge Goal   Discharge Goal Independent   Putting On/Taking Off Footwear   Assistance Needed Physical assistance   Physical Assistance Level Less than 25%   CARE Score 3   Putting On/Taking Off Footwear Discharge Goal   Discharge Goal Independent   Toileting Hygiene   Assistance Needed Physical assistance "   Physical Assistance Level 25%-49%   CARE Score 3   Toileting Hygiene Discharge Goal   Discharge Goal Independent   Toilet Transfer   Assistance Needed Physical assistance   Physical Assistance Level 25%-49%   CARE Score 3   Toilet Transfer Discharge Goal   Discharge Goal Independent   Hearing, Speech, and Vision   Expression of Ideas and Wants Without difficulty   Understanding Verbal and Non-Verbal Content Understands   Functional Level of Assist   Eating Stand by Assist   Grooming Stand by Assist   Upper Body Dressing Stand by Assist   Lower Body Dressing Moderate Assist   Lower Body Dressing Description Increased time;Initial preparation for task;Set-up of equipment;Supervision for safety;Assistive devices  (with FWW)   Toileting Moderate Assist   Bed, Chair, Wheelchair Transfer Moderate Assist   Toilet Transfers Moderate Assist   Comprehension Independent   Expression Independent   Problem List   Problem List Decreased Active Daily Living Skills;Decreased Homemaking Skills;Decreased Functional Mobility;Decreased Activity Tolerance;Impaired Postural Control / Balance;Limited Knowledge of Post Op Precautions   Precautions   Precautions Fall Risk;Weight Bearing As Tolerated Right Lower Extremity;Immobilizer Right Lower Extremity   Current Discharge Plan   Current Discharge Plan Return to Prior Living Situation   Benefit    Therapy Benefit Patient Would Benefit from Inpatient Rehab Occupational Therapy to Maximize [place] with ADLs, IADLs and Functional Mobility.   Interdisciplinary Plan of Care Collaboration   IDT Collaboration with  Nursing   Patient Position at End of Therapy In Bed;Call Light within Reach   Collaboration Comments RN administered pain medication   Equipment Needs   Assistive Device / DME Front-Wheel Walker;Shower Chair;Grab Bars In Shower / Tub;Grab Bars By Toilet   Adaptive Equipment Reacher;Sock Aide;Other (Comments);Long Handled Sponge  (TBA further )   Strengths & Barriers   Strengths  Able to follow instructions;Alert and oriented;Independent prior level of function;Effective communication skills;Pleasant and cooperative;Supportive family   Barriers Fatigue;Decreased endurance;Generalized weakness;Impaired activity tolerance;Impaired balance;Pain   OT Total Time Spent   OT Individual Total Time Spent (Mins) 60   OT Charge Group   OT Self Care / ADL 1   OT Evaluation OT Evaluation High       Assessment  Patient is 45 y.o. male with a diagnosis of s/p R knee arthroplasty.  Additional factors influencing patient status / progress (ie: cognitive factors, co-morbidities, social support, etc): past medical history significant for BPD, GERD, MONICA, A fib and severe RA. Previously mod I to independent for all ADLs with intermittent use of AD (cane or FWW). Resides with spouse and 2 young children (age 2 and 4). Patient will benefit from daily skilled OT to maximize independence with ADLs, IADLs, functional transfers and mobility.     Plan  Recommend Occupational Therapy  minutes per day 5-7 days per week for 10 days days for the following treatments:  OT Self Care/ADL, OT Community Reintegration, OT Manual Ther Technique, OT Neuro Re-Ed/Balance, OT Therapeutic Activity, OT Evaluation and OT Therapeutic Exercise.    Goals:  Long term and short term goals have been discussed with patient and they are in agreement.    Occupational Therapy Goals     Problem: Dressing     Dates: Start: 07/28/20       Goal: STG-Within one week, patient will dress LB     Dates: Start: 07/28/20       Description: 1) Individualized Goal:  Min assist with AE as needed  2) Interventions:  OT Self Care/ADL, OT Manual Ther Technique, OT Neuro Re-Ed/Balance, OT Sensory Int Techniques, OT Therapeutic Activity, OT Evaluation, and OT Therapeutic Exercise                  Problem: Functional Transfers     Dates: Start: 07/28/20       Description: 1) Individualized Goal:  CGA    2) Interventions:  OT Self Care/ADL, OT Manual Ther  Technique, OT Neuro Re-Ed/Balance, OT Sensory Int Techniques, OT Therapeutic Activity, OT Evaluation, and OT Therapeutic Exercise    Goal: STG-Within one week, patient will transfer to toilet     Dates: Start: 07/28/20       Description: 1) Individualized Goal:  CGA    2) Interventions:  OT Self Care/ADL, OT Manual Ther Technique, OT Neuro Re-Ed/Balance, OT Sensory Int Techniques, OT Therapeutic Activity, OT Evaluation, and OT Therapeutic Exercise          Goal: STG-Within one week, patient will transfer to step in shower     Dates: Start: 07/28/20       Description: 1) Individualized Goal:  CGA    2) Interventions:  OT Self Care/ADL, OT Manual Ther Technique, OT Neuro Re-Ed/Balance, OT Sensory Int Techniques, OT Therapeutic Activity, OT Evaluation, and OT Therapeutic Exercise                Problem: IADL's     Dates: Start: 07/28/20       Goal: STG-Within one week, patient will access kitchen area     Dates: Start: 07/28/20       Description: 1) Individualized Goal:  Min assist with FWW    2) Interventions:  OT Self Care/ADL, OT Manual Ther Technique, OT Neuro Re-Ed/Balance, OT Sensory Int Techniques, OT Therapeutic Activity, OT Evaluation, and OT Therapeutic Exercise                Problem: OT Long Term Goals     Dates: Start: 07/28/20       Goal: LTG-By discharge, patient will complete basic self care tasks     Dates: Start: 07/28/20       Description: 1) Individualized Goal:  Mod I with AE as needed    2) Interventions:  OT Self Care/ADL, OT Manual Ther Technique, OT Neuro Re-Ed/Balance, OT Sensory Int Techniques, OT Therapeutic Activity, OT Evaluation, and OT Therapeutic Exercise          Goal: LTG-By discharge, patient will perform bathroom transfers     Dates: Start: 07/28/20       Description: 1) Individualized Goal:  Mod I   2) Interventions:  OT Self Care/ADL, OT Manual Ther Technique, OT Neuro Re-Ed/Balance, OT Sensory Int Techniques, OT Therapeutic Activity, OT Evaluation, and OT Therapeutic Exercise

## 2020-07-28 NOTE — THERAPY
Physical Therapy   Daily Treatment     Patient Name: Richard Hubbard II  Age:  45 y.o., Sex:  male  Medical Record #: [id number]  Today's Date: 7/28/2020     Precautions  Precautions: (P) Fall Risk, Weight Bearing As Tolerated Right Lower Extremity, Immobilizer Right Lower Extremity  Comments: (P) No pelvic precautions; no R knee ROM at this time    Subjective    Patient reported that Roho cushion and wc set up was tolerable, and improved from previous set up.      Objective       07/28/20 1431   Precautions   Precautions Fall Risk;Weight Bearing As Tolerated Right Lower Extremity;Immobilizer Right Lower Extremity   Comments No pelvic precautions; no R knee ROM at this time   Wheelchair Functional Level of Assist   Wheelchair Assist Supervised   Distance Wheelchair (Feet or Distance) 100   Wheelchair Description Limited by fatigue   Transfer Functional Level of Assist   Bed, Chair, Wheelchair Transfer Contact Guard Assist   Bed Chair Wheelchair Transfer Description Adaptive equipment;Supervision for safety  (SPT with FWW)   Bed Mobility    Supine to Sit Stand by Assist   Sit to Stand Contact Guard Assist   Scooting Supervised   Rolling Supervised   Neuro-Muscular Treatments   Neuro-Muscular Treatments Weight Shift Right;Weight Shift Left;Verbal Cuing;Sequencing;Tactile Cuing;Postural Facilitation;Postural Changes   Comments Patient provided with non skid sock for RLE. Patient provided with dysom for elevating leg rest and Ace wrap for support with RLE/ immobilizer to maintain stability.     Interdisciplinary Plan of Care Collaboration   IDT Collaboration with  Nursing;Certified Nursing Assistant;Recreation Therapist;Physician   Patient Position at End of Therapy Seated   Collaboration Comments Handoff to rec therapist. Physician clarified- no additional precautions for pelvic fractures. No ROM to R knee at this time.    Strengths & Barriers   Strengths Supportive family;Motivated for self care and independence  "  Barriers Limited mobility;Impaired balance;Impaired activity tolerance;Pain;Pain poorly managed;Decreased endurance;Fatigue   PT Total Time Spent   PT Individual Total Time Spent (Mins) 30   PT Charge Group   PT Therapeutic Activities 2       Assessment    Patient progressed to transfer assessment, and wc mobility after wc set up was adapted. Patient remains limited by pain, and fear of movement.     Strengths: (P) Supportive family, Motivated for self care and independence  Barriers: (P) Limited mobility, Impaired balance, Impaired activity tolerance, Pain, Pain poorly managed, Decreased endurance, Fatigue    Plan    Assess gait, transfer sequencing, standing tolerance, wc mob, endurance.     Physical Therapy Problems     Problem: Mobility     Dates: Start: 07/28/20       Goal: STG-Within one week, patient will ambulate household distance     Dates: Start: 07/28/20       Description: 1) Individualized goal:  Patient will amb with FWW 20 ft x 2 indoors   2) Interventions:  PT Gait Training, PT Self Care/Home Eval, PT Therapeutic Exercises, PT Neuro Re-Ed/Balance, PT Aquatic Therapy, PT Therapeutic Activity, PT Manual Therapy, and PT Evaluation            Goal: STG-Within one week, patient will ascend and descend four to six stairs     Dates: Start: 07/28/20       Description: 1) Individualized goal:  Patient will amb up/down 2\" // bars 2x CGA.   2) Interventions:  PT Gait Training, PT Self Care/Home Eval, PT Therapeutic Exercises, PT Neuro Re-Ed/Balance, PT Aquatic Therapy, PT Therapeutic Activity, PT Manual Therapy, and PT Evaluation                  Problem: Mobility Transfers     Dates: Start: 07/28/20       Goal: STG-Within one week, patient will transfer bed to chair     Dates: Start: 07/28/20       Description: 1) Individualized goal:  Patient will transfer CGA SPT with FWW, SBA bed mob   2) Interventions:  PT Gait Training, PT Self Care/Home Eval, PT Therapeutic Exercises, PT Neuro Re-Ed/Balance, PT Aquatic " Therapy, PT Therapeutic Activity, PT Manual Therapy, and PT Evaluation                  Problem: PT-Long Term Goals     Dates: Start: 07/28/20       Goal: LTG-By discharge, patient will ambulate     Dates: Start: 07/28/20       Description: 1) Individualized goal:  Patient will amb >50 ft indoors mod I with FWW  2) Interventions:  PT Gait Training, PT Self Care/Home Eval, PT Therapeutic Exercises, PT Neuro Re-Ed/Balance, PT Aquatic Therapy, PT Therapeutic Activity, PT Manual Therapy, and PT Evaluation            Goal: LTG-By discharge, patient will transfer one surface to another     Dates: Start: 07/28/20       Description: 1) Individualized goal: Patient will transfer Mod I with FWW STS/SPT   2) Interventions:  PT Gait Training, PT Self Care/Home Eval, PT Therapeutic Exercises, PT Neuro Re-Ed/Balance, PT Aquatic Therapy, PT Therapeutic Activity, PT Manual Therapy, and PT Evaluation            Goal: LTG-By discharge, patient will ambulate up/down 4-6 stairs     Dates: Start: 07/28/20       Description: 1) Individualized goal:  Patient will amb up/down 2 standard stairs with no railing CGA  2) Interventions:  PT Gait Training, PT Self Care/Home Eval, PT Therapeutic Exercises, PT Neuro Re-Ed/Balance, PT Aquatic Therapy, PT Therapeutic Activity, PT Manual Therapy, and PT Evaluation

## 2020-07-28 NOTE — CARE PLAN
Problem: Pain Management  Goal: Pain level will decrease to patient's comfort goal  Outcome: NOT MET     Problem: Psychosocial Needs:  Goal: Level of anxiety will decrease  Note: Pt has considerable anxiety regarding his pain control.

## 2020-07-28 NOTE — THERAPY
"Physical Therapy   Initial Evaluation     Patient Name: Richard Hubbard II  Age:  45 y.o., Sex:  male  Medical Record #: [id number]  Today's Date: 7/28/2020     Subjective    Patient expressed concerns with pain management, describing pain as \"throbbing, shooting, and agonizing\".      Objective       07/28/20 1031   Prior Living Situation   Prior Services None;Home-Independent   Housing / Facility 1 Story House   Steps Into Home 2   Steps In Home 0   Rail None   Bathroom Set up Shower Chair   Equipment Owned Front-Wheel Walker;Platform Walker;Wheelchair;Tub / Shower Seat  (hip kit)   Lives with - Patient's Self Care Capacity Spouse;Child Less than 18 Years of Age  (2 & 4 years old, cares for children, spouse works full time)   Prior Level of Functional Mobility   Bed Mobility Independent   Transfer Status Independent   Ambulation Independent   Distance Ambulation (Feet)   (household (between pelvic freacture, and knee sx))   Assistive Devices Used Front-Wheel Walker  (PFWW and FWW both used at home)   Wheelchair Independent   Stairs Independent   Prior Functioning: Everyday Activities   Self Care Independent   Indoor Mobility (Ambulation) Independent   Stairs Independent   Functional Cognition Independent   Prior Device Use Walker   Pain 0 - 10 Group   Therapist Pain Assessment 7  (\"agonizing, shooting, throbbing\")   Passive ROM Lower Body   Comments LLE limited by pain- WFL/ unable to assess RLE due to immobilizer.    Strength Lower Body   Comments LLE assessment limited by pain; RLE limited by pain/ immobilizer    Sensation Lower Body   Lower Extremity Sensation   X   Rt Lower Extremity Light Touch Impaired   Lt Lower Extremity Light Touch Impaired   Paresthesia Present    Comments Tingling present in RLE. Diminished RLE 1st-3rd digits. Diminished LLE 4th, 5th digits.    Balance Assessment   Comments Unable to assess; patient declined sitting up    Bed Mobility    Supine to Sit Refused   Sit to Stand Refused "   Scooting Supervised   Rolling Supervised   Neurological Concerns   Neurological Concerns Yes   Paresthesia Present   Comments Reported tingling   Roll Left and Right   Assistance Needed Supervision;Adaptive equipment   Physical Assistance Level No physical assistance   CARE Score 4   Roll Left and Right Discharge Goal   Discharge Goal Independent   Sit to Lying   Reason if not Attempted Refused to perform   CARE Score 7   Sit to Lying Discharge Goal   Discharge Goal Independent   Lying to Sitting on Side of Bed   Reason if not Attempted Refused to perform   CARE Score 7   Lying to Sitting on Side of Bed Discharge Goal   Discharge Goal Independent   Sit to Stand   Reason if not Attempted Refused to perform   CARE Score 7   Sit to Stand Discharge Goal   Discharge Goal Independent   Chair/Bed-to-Chair Transfer   Reason if not Attempted Refused to perform   CARE Score 7   Chair/Bed-to-Chair Transfer Discharge Goal   Discharge Goal Independent   Toilet Transfer   Reason if not Attempted Refused to perform   CARE Score 7   Toilet Transfer Discharge Goal   Discharge Goal Independent   Car Transfer   Reason if not Attempted Safety concerns   CARE Score 88   Car Transfer Discharge Goal   Discharge Goal Supervision or touching assistance   Walk 10 Feet   Reason if not Attempted Refused to perform   CARE Score 7   Walk 10 Feet Discharge Goal   Discharge Goal Independent   Walk 50 Feet with Two Turns   Reason if not Attempted Refused to perform   CARE Score 7   Walk 50 Feet with Two Turns Discharge Goal   Discharge Goal Independent   Walk 150 Feet   Reason if not Attempted Safety concerns   CARE Score 88   Walk 150 Feet Discharge Goal   Discharge Goal Supervision or touching assistance   Walking 10 Feet on Uneven Surfaces   Reason if not Attempted Safety concerns   CARE Score 88   Walking 10 Feet on Uneven Surfaces Discharge Goal   Discharge Goal Supervision or touching assistance   1 Step (Curb)   Reason if not Attempted  Safety concerns   CARE Score 88   1 Step (Curb) Discharge Goal   Discharge Goal Supervision or touching assistance   4 Steps   Reason if not Attempted Safety concerns   CARE Score 88   4 Steps Discharge Goal   Discharge Goal Supervision or touching assistance   12 Steps   Reason if not Attempted Safety concerns   CARE Score 88   12 Steps Discharge Goal   Discharge Goal Partial/moderate assistance   Picking Up Object   Reason if not Attempted Safety concerns   CARE Score 88   Picking Up Object Discharge Goal   Discharge Goal Supervision or touching assistance   Wheel 50 Feet with Two Turns   Reason if not Attempted Refused to perform   CARE Score 7   Type of Wheelchair/Scooter Manual   Wheel 50 Feet with Two Turns Discharge Goal   Discharge Goal Independent   Wheel 150 Feet   Reason if not Attempted Refused to perform   CARE Score 7   Type of Wheelchair/Scooter Manual   Wheel 150 Feet Discharge Goal   Discharge Goal Independent   Gait Functional Level of Assist    Gait Level Of Assist Refused   Wheelchair Functional Level of Assist   Wheelchair Assist Refused   Stairs Functional Level of Assist   Level of Assist with Stairs Unable to Participate   Transfer Functional Level of Assist   Bed, Chair, Wheelchair Transfer Refused   Toilet Transfers Refused  (used urinal)   Problem List    Problems Pain;Impaired Bed Mobility;Impaired Transfers;Decreased Activity Tolerance;Impaired Ambulation   Precautions   Precautions Weight Bearing As Tolerated Right Lower Extremity;Immobilizer Right Lower Extremity;Fall Risk   Current Discharge Plan   Current Discharge Plan Return to Prior Living Situation  (Spouse works full time; patient is caring for 2 children)   Interdisciplinary Plan of Care Collaboration   IDT Collaboration with  Physical Therapist   Patient Position at End of Therapy In Bed;Tray Table within Reach   Collaboration Comments RoHo cushion provided for improved seated tolerance    Benefit   Therapy Benefit Patient  Would Benefit from Inpatient Rehabilitation Physical Therapy to Maximize Functional [place] with ADLs, IADLs and Mobility.   Strengths & Barriers   Strengths Supportive family;Independent prior level of function;Motivated for self care and independence   Barriers Decreased endurance;Impaired activity tolerance;Limited mobility;Pain poorly managed;Pain  (Immobilizer )   PT Total Time Spent   PT Individual Total Time Spent (Mins) 60   PT Charge Group   PT Therapeutic Activities 1   PT Evaluation PT Evaluation Mod     Collaboration with Dr. Madhav Frankel Plans to start long acting Morphine; RN provided it during session.     Assessment  Patient is 45 y.o. male with a diagnosis of s/p TKA on 7/22 due to severe valgus deformity and insufficiency of MCL, with severe anemia.  Additional factors influencing patient status / progress (ie: cognitive factors, co-morbidities, social support, etc): Recent pelvic fracture, with recent stay in [facility], BPD, GERD, MONICA, A fib, severe RA, knee immobilizer, WBAT.      Plan  Recommend Physical Therapy  minutes per day 5-7 days per week for 2 weeks for the following treatments:  PT E Stim Attended, PT Gait Training, PT Self Care/Home Eval, PT Therapeutic Exercises, PT Neuro Re-Ed/Balance, PT Aquatic Therapy, PT Therapeutic Activity, PT Manual Therapy and PT Evaluation.    Goals:  Long term and short term goals have been discussed with patient and they are in agreement.    Physical Therapy Problems     Problem: Mobility     Dates: Start: 07/28/20       Goal: STG-Within one week, patient will ambulate household distance     Dates: Start: 07/28/20       Description: 1) Individualized goal:  Patient will amb with FWW 20 ft x 2 indoors   2) Interventions:  PT Gait Training, PT Self Care/Home Eval, PT Therapeutic Exercises, PT Neuro Re-Ed/Balance, PT Aquatic Therapy, PT Therapeutic Activity, PT Manual Therapy, and PT Evaluation            Goal: STG-Within one week, patient will  "ascend and descend four to six stairs     Dates: Start: 07/28/20       Description: 1) Individualized goal:  Patient will amb up/down 2\" // bars 2x CGA.   2) Interventions:  PT Gait Training, PT Self Care/Home Eval, PT Therapeutic Exercises, PT Neuro Re-Ed/Balance, PT Aquatic Therapy, PT Therapeutic Activity, PT Manual Therapy, and PT Evaluation                  Problem: Mobility Transfers     Dates: Start: 07/28/20       Goal: STG-Within one week, patient will transfer bed to chair     Dates: Start: 07/28/20       Description: 1) Individualized goal:  Patient will transfer CGA SPT with FWW, SBA bed mob   2) Interventions:  PT Gait Training, PT Self Care/Home Eval, PT Therapeutic Exercises, PT Neuro Re-Ed/Balance, PT Aquatic Therapy, PT Therapeutic Activity, PT Manual Therapy, and PT Evaluation                  Problem: PT-Long Term Goals     Dates: Start: 07/28/20       Goal: LTG-By discharge, patient will ambulate     Dates: Start: 07/28/20       Description: 1) Individualized goal:  Patient will amb >50 ft indoors mod I with FWW  2) Interventions:  PT Gait Training, PT Self Care/Home Eval, PT Therapeutic Exercises, PT Neuro Re-Ed/Balance, PT Aquatic Therapy, PT Therapeutic Activity, PT Manual Therapy, and PT Evaluation            Goal: LTG-By discharge, patient will transfer one surface to another     Dates: Start: 07/28/20       Description: 1) Individualized goal: Patient will transfer Mod I with FWW STS/SPT   2) Interventions:  PT Gait Training, PT Self Care/Home Eval, PT Therapeutic Exercises, PT Neuro Re-Ed/Balance, PT Aquatic Therapy, PT Therapeutic Activity, PT Manual Therapy, and PT Evaluation            Goal: LTG-By discharge, patient will ambulate up/down 4-6 stairs     Dates: Start: 07/28/20       Description: 1) Individualized goal:  Patient will amb up/down 2 standard stairs with no railing CGA  2) Interventions:  PT Gait Training, PT Self Care/Home Eval, PT Therapeutic Exercises, PT Neuro " Re-Ed/Balance, PT Aquatic Therapy, PT Therapeutic Activity, PT Manual Therapy, and PT Evaluation

## 2020-07-29 PROCEDURE — A9270 NON-COVERED ITEM OR SERVICE: HCPCS | Performed by: PHYSICAL MEDICINE & REHABILITATION

## 2020-07-29 PROCEDURE — 97110 THERAPEUTIC EXERCISES: CPT

## 2020-07-29 PROCEDURE — 97535 SELF CARE MNGMENT TRAINING: CPT | Mod: CO

## 2020-07-29 PROCEDURE — 99232 SBSQ HOSP IP/OBS MODERATE 35: CPT | Performed by: PHYSICAL MEDICINE & REHABILITATION

## 2020-07-29 PROCEDURE — 770010 HCHG ROOM/CARE - REHAB SEMI PRIVAT*

## 2020-07-29 PROCEDURE — 97116 GAIT TRAINING THERAPY: CPT

## 2020-07-29 PROCEDURE — 700102 HCHG RX REV CODE 250 W/ 637 OVERRIDE(OP): Performed by: PHYSICAL MEDICINE & REHABILITATION

## 2020-07-29 PROCEDURE — 97530 THERAPEUTIC ACTIVITIES: CPT

## 2020-07-29 RX ADMIN — OMEPRAZOLE 20 MG: 20 CAPSULE, DELAYED RELEASE ORAL at 08:11

## 2020-07-29 RX ADMIN — ASPIRIN 81 MG: 81 TABLET, COATED ORAL at 08:12

## 2020-07-29 RX ADMIN — MORPHINE SULFATE 15 MG: 15 TABLET, FILM COATED, EXTENDED RELEASE ORAL at 08:11

## 2020-07-29 RX ADMIN — PREGABALIN 150 MG: 150 CAPSULE ORAL at 08:11

## 2020-07-29 RX ADMIN — PREGABALIN 150 MG: 150 CAPSULE ORAL at 14:53

## 2020-07-29 RX ADMIN — MORPHINE SULFATE 15 MG: 15 TABLET ORAL at 14:53

## 2020-07-29 RX ADMIN — MORPHINE SULFATE 15 MG: 15 TABLET ORAL at 18:54

## 2020-07-29 RX ADMIN — FERROUS SULFATE TAB 325 MG (65 MG ELEMENTAL FE) 325 MG: 325 (65 FE) TAB at 18:53

## 2020-07-29 RX ADMIN — TRAMADOL HYDROCHLORIDE 50 MG: 50 TABLET, COATED ORAL at 16:31

## 2020-07-29 RX ADMIN — MORPHINE SULFATE 15 MG: 15 TABLET ORAL at 08:13

## 2020-07-29 RX ADMIN — MORPHINE SULFATE 15 MG: 15 TABLET, FILM COATED, EXTENDED RELEASE ORAL at 20:19

## 2020-07-29 RX ADMIN — Medication 2000 UNITS: at 08:11

## 2020-07-29 RX ADMIN — ZOLPIDEM TARTRATE 5 MG: 5 TABLET ORAL at 23:37

## 2020-07-29 RX ADMIN — MORPHINE SULFATE 15 MG: 15 TABLET ORAL at 06:21

## 2020-07-29 RX ADMIN — DOCUSATE SODIUM 50 MG AND SENNOSIDES 8.6 MG 2 TABLET: 8.6; 5 TABLET, FILM COATED ORAL at 21:00

## 2020-07-29 RX ADMIN — Medication 500 MG: at 18:54

## 2020-07-29 RX ADMIN — Medication 500 MG: at 08:11

## 2020-07-29 RX ADMIN — FERROUS SULFATE TAB 325 MG (65 MG ELEMENTAL FE) 325 MG: 325 (65 FE) TAB at 08:11

## 2020-07-29 RX ADMIN — MORPHINE SULFATE 15 MG: 15 TABLET ORAL at 20:19

## 2020-07-29 RX ADMIN — TRAMADOL HYDROCHLORIDE 50 MG: 50 TABLET, COATED ORAL at 10:34

## 2020-07-29 RX ADMIN — DOCUSATE SODIUM 50 MG AND SENNOSIDES 8.6 MG 2 TABLET: 8.6; 5 TABLET, FILM COATED ORAL at 09:00

## 2020-07-29 RX ADMIN — TRAMADOL HYDROCHLORIDE 50 MG: 50 TABLET, COATED ORAL at 22:52

## 2020-07-29 RX ADMIN — PREGABALIN 150 MG: 150 CAPSULE ORAL at 20:20

## 2020-07-29 RX ADMIN — ZOLPIDEM TARTRATE 5 MG: 5 TABLET ORAL at 22:45

## 2020-07-29 RX ADMIN — NICOTINE 7 MG: 7 PATCH, EXTENDED RELEASE TRANSDERMAL at 18:54

## 2020-07-29 RX ADMIN — MORPHINE SULFATE 15 MG: 15 TABLET ORAL at 03:30

## 2020-07-29 RX ADMIN — MORPHINE SULFATE 15 MG: 15 TABLET ORAL at 13:02

## 2020-07-29 ASSESSMENT — GAIT ASSESSMENTS
ASSISTIVE DEVICE: FRONT WHEEL WALKER
GAIT LEVEL OF ASSIST: CONTACT GUARD ASSIST
GAIT LEVEL OF ASSIST: CONTACT GUARD ASSIST
DISTANCE (FEET): 110
DISTANCE (FEET): 100
GAIT LEVEL OF ASSIST: CONTACT GUARD ASSIST
DEVIATION: ANTALGIC;BRADYKINETIC
ASSISTIVE DEVICE: FRONT WHEEL WALKER
DEVIATION: ANTALGIC;BRADYKINETIC
ASSISTIVE DEVICE: FRONT WHEEL WALKER
DEVIATION: ANTALGIC;BRADYKINETIC

## 2020-07-29 ASSESSMENT — ACTIVITIES OF DAILY LIVING (ADL)
BED_CHAIR_WHEELCHAIR_TRANSFER_DESCRIPTION: ADAPTIVE EQUIPMENT;INCREASED TIME;SUPERVISION FOR SAFETY
TUB_SHOWER_TRANSFER_DESCRIPTION: GRAB BAR
BED_CHAIR_WHEELCHAIR_TRANSFER_DESCRIPTION: ADAPTIVE EQUIPMENT;INCREASED TIME;SUPERVISION FOR SAFETY
BED_CHAIR_WHEELCHAIR_TRANSFER_DESCRIPTION: ADAPTIVE EQUIPMENT;INCREASED TIME;SUPERVISION FOR SAFETY

## 2020-07-29 NOTE — THERAPY
"Physical Therapy   Daily Treatment     Patient Name: Richard Hubbard II  Age:  45 y.o., Sex:  male  Medical Record #: [id number]  Today's Date: 7/29/2020     Precautions  Precautions: (P) Fall Risk, Weight Bearing As Tolerated Right Lower Extremity, Immobilizer Right Lower Extremity  Comments: (P) No pelvic precautions; no R knee ROM at this time    Subjective    Patient in bed and agreeable to therapy.    \"I'm doing so much better now that my pain is managed.\"     Objective       07/29/20 0731   Precautions   Precautions Fall Risk;Weight Bearing As Tolerated Right Lower Extremity;Immobilizer Right Lower Extremity   Comments No pelvic precautions; no R knee ROM at this time   Sleep/Wake Cycle   Sleep & Rest Awake   Gait Functional Level of Assist    Gait Level Of Assist Contact Guard Assist   Assistive Device Front Wheel Walker   Distance (Feet)   (80 ftx1, 10 ftx1)   Deviation Antalgic;Bradykinetic  (R toe out/ER position, heavy UE use)   Transfer Functional Level of Assist   Bed, Chair, Wheelchair Transfer Contact Guard Assist   Bed Chair Wheelchair Transfer Description Adaptive equipment;Increased time;Supervision for safety  (stand step transfer with FWW)   Bed Mobility    Supine to Sit Stand by Assist   Sit to Supine Stand by Assist   Sit to Stand Contact Guard Assist   Scooting Supervised   Rolling Supervised   Interdisciplinary Plan of Care Collaboration   Patient Position at End of Therapy In Bed;Call Light within Reach;Tray Table within Reach;Phone within Reach   PT Total Time Spent   PT Individual Total Time Spent (Mins) 30   PT Charge Group   PT Gait Training 1   PT Therapeutic Activities 1     Donning and doffing of knee immobilizer for LB dressing with Min A.    Education regarding R knee numbness and gentle sensory feedback, as well as regular skin checks to BLE feet due to impaired sensation.     Assessment    Patient tolerated session well, significantly improved mobility with pain managed. " "Patient requires pacing of activity and is very motivated.   Strengths: Supportive family, Motivated for self care and independence  Barriers: Limited mobility, Impaired balance, Impaired activity tolerance, Pain, Pain poorly managed, Decreased endurance, Fatigue    Plan    Gait training with FWW, transfer training, BLE strengthening with RLE in immobilizer, standing and activity tolerance, stair/curb negotiation as tolerated, standing balance.    Physical Therapy Problems     Problem: Mobility     Dates: Start: 07/28/20       Goal: STG-Within one week, patient will ambulate household distance     Dates: Start: 07/28/20       Description: 1) Individualized goal:  Patient will amb with FWW 20 ft x 2 indoors   2) Interventions:  PT Gait Training, PT Self Care/Home Eval, PT Therapeutic Exercises, PT Neuro Re-Ed/Balance, PT Aquatic Therapy, PT Therapeutic Activity, PT Manual Therapy, and PT Evaluation            Goal: STG-Within one week, patient will ascend and descend four to six stairs     Dates: Start: 07/28/20       Description: 1) Individualized goal:  Patient will amb up/down 2\" // bars 2x CGA.   2) Interventions:  PT Gait Training, PT Self Care/Home Eval, PT Therapeutic Exercises, PT Neuro Re-Ed/Balance, PT Aquatic Therapy, PT Therapeutic Activity, PT Manual Therapy, and PT Evaluation                  Problem: Mobility Transfers     Dates: Start: 07/28/20       Goal: STG-Within one week, patient will transfer bed to chair     Dates: Start: 07/28/20       Description: 1) Individualized goal:  Patient will transfer CGA SPT with FWW, SBA bed mob   2) Interventions:  PT Gait Training, PT Self Care/Home Eval, PT Therapeutic Exercises, PT Neuro Re-Ed/Balance, PT Aquatic Therapy, PT Therapeutic Activity, PT Manual Therapy, and PT Evaluation                  Problem: PT-Long Term Goals     Dates: Start: 07/28/20       Goal: LTG-By discharge, patient will ambulate     Dates: Start: 07/28/20       Description: 1) " Individualized goal:  Patient will amb >50 ft indoors mod I with FWW  2) Interventions:  PT Gait Training, PT Self Care/Home Eval, PT Therapeutic Exercises, PT Neuro Re-Ed/Balance, PT Aquatic Therapy, PT Therapeutic Activity, PT Manual Therapy, and PT Evaluation            Goal: LTG-By discharge, patient will transfer one surface to another     Dates: Start: 07/28/20       Description: 1) Individualized goal: Patient will transfer Mod I with FWW STS/SPT   2) Interventions:  PT Gait Training, PT Self Care/Home Eval, PT Therapeutic Exercises, PT Neuro Re-Ed/Balance, PT Aquatic Therapy, PT Therapeutic Activity, PT Manual Therapy, and PT Evaluation            Goal: LTG-By discharge, patient will ambulate up/down 4-6 stairs     Dates: Start: 07/28/20       Description: 1) Individualized goal:  Patient will amb up/down 2 standard stairs with no railing CGA  2) Interventions:  PT Gait Training, PT Self Care/Home Eval, PT Therapeutic Exercises, PT Neuro Re-Ed/Balance, PT Aquatic Therapy, PT Therapeutic Activity, PT Manual Therapy, and PT Evaluation

## 2020-07-29 NOTE — THERAPY
Recreational Therapy  Daily Treatment     Patient Name: Richard Hubbard II  AGE:  45 y.o., SEX:  male  Medical Record #: [id number]  Today's Date: 7/29/2020       Subjective    Pt reporting that he was feeling stronger than the prior day.      Objective       07/29/20 0901   Functional Ability Status - Cognitive   Attention Span Remains on Task   Comprehension Follows Three Step Commands   Judgment Able to Make Independent Decisions   Functional Ability Status - Emotional    Affect Appropriate   Mood Appropriate   Behavior Appropriate   Skilled Intervention    Skilled Intervention Cognitive Leisure;Relaxation / Coping Skills   Skilled Intervention Comments Sloan valentin   Interdisciplinary Plan of Care Collaboration   Patient Position at End of Therapy Seated;Call Light within Reach;Tray Table within Reach   Strengths & Barriers   Strengths Able to follow instructions;Alert and oriented;Effective communication skills;Good insight into deficits/needs;Motivated for self care and independence;Pleasant and cooperative;Supportive family;Willingly participates in therapeutic activities   Barriers Fatigue;Generalized weakness;Impaired activity tolerance;Impulsive;Pain   Treatment Time   Total Time Spent (mins) 30   Procedural Tracking   Procedural Tracking Leisure Skills Development       Assessment    Pt transferred from bed to WC Min A. Pt reporting paint we he would try and lift his R LE. Pt speaking on having pain prior to session and contacted the nurse.     Strengths: (P) Able to follow instructions, Alert and oriented, Effective communication skills, Good insight into deficits/needs, Motivated for self care and independence, Pleasant and cooperative, Supportive family, Willingly participates in therapeutic activities  Barriers: (P) Fatigue, Generalized weakness, Impaired activity tolerance, Impulsive, Pain    Plan    Positive coping skills and distraction techniques for pain.

## 2020-07-29 NOTE — PROGRESS NOTES
"Rehab Progress Note     Encounter Date: 7/29/2020    CC: right knee pain, sacral pain, chronic pain    Interval Events (Subjective)  Patient sitting up in bed. He reports his pain is much more controlled today on long acting. He reports he was able to work more with therapy. Discussed about continuing for now on long acting. He is in agreement. He reports otherwise he feels like as his knee swelling comes down he may be able to decrease the PRN then discontinue the long acting. Reviewed incision and can hold on ACE wrapping for now as causing severe discomfort. Hold on MIMI hose until wound prevena system removed after battery dies.  Otherwise denies constipation or NVD.     Objective:  VITAL SIGNS: /73   Pulse 86   Temp 36.8 °C (98.2 °F) (Oral)   Resp 18   Ht 1.803 m (5' 11\")   Wt 101.6 kg (224 lb)   SpO2 96%   BMI 31.24 kg/m²   Gen: NAD  Psych: Mood and affect appropriate  CV: RRR, RLE 2+ edema, 0 on LLE  Resp: CTAB, no upper airway sounds  Abd: NTND  Neuro: AOx4, following commands  Skin: Prevena dressing to right knee    Recent Results (from the past 72 hour(s))   CBC with Differential    Collection Time: 07/28/20  6:18 AM   Result Value Ref Range    WBC 6.7 4.8 - 10.8 K/uL    RBC 2.94 (L) 4.70 - 6.10 M/uL    Hemoglobin 8.6 (L) 14.0 - 18.0 g/dL    Hematocrit 27.3 (L) 42.0 - 52.0 %    MCV 92.9 81.4 - 97.8 fL    MCH 29.3 27.0 - 33.0 pg    MCHC 31.5 (L) 33.7 - 35.3 g/dL    RDW 45.5 35.9 - 50.0 fL    Platelet Count 255 164 - 446 K/uL    MPV 9.6 9.0 - 12.9 fL    Neutrophils-Polys 72.00 44.00 - 72.00 %    Lymphocytes 16.60 (L) 22.00 - 41.00 %    Monocytes 7.00 0.00 - 13.40 %    Eosinophils 2.70 0.00 - 6.90 %    Basophils 0.40 0.00 - 1.80 %    Immature Granulocytes 1.30 (H) 0.00 - 0.90 %    Nucleated RBC 0.00 /100 WBC    Neutrophils (Absolute) 4.81 1.82 - 7.42 K/uL    Lymphs (Absolute) 1.11 1.00 - 4.80 K/uL    Monos (Absolute) 0.47 0.00 - 0.85 K/uL    Eos (Absolute) 0.18 0.00 - 0.51 K/uL    Baso (Absolute) " 0.03 0.00 - 0.12 K/uL    Immature Granulocytes (abs) 0.09 0.00 - 0.11 K/uL    NRBC (Absolute) 0.00 K/uL   Comp Metabolic Panel (CMP)    Collection Time: 07/28/20  6:18 AM   Result Value Ref Range    Sodium 137 135 - 145 mmol/L    Potassium 4.1 3.6 - 5.5 mmol/L    Chloride 104 96 - 112 mmol/L    Co2 24 20 - 33 mmol/L    Anion Gap 9.0 7.0 - 16.0    Glucose 92 65 - 99 mg/dL    Bun 11 8 - 22 mg/dL    Creatinine 0.46 (L) 0.50 - 1.40 mg/dL    Calcium 8.5 8.5 - 10.5 mg/dL    AST(SGOT) 12 12 - 45 U/L    ALT(SGPT) 10 2 - 50 U/L    Alkaline Phosphatase 152 (H) 30 - 99 U/L    Total Bilirubin 0.5 0.1 - 1.5 mg/dL    Albumin 2.6 (L) 3.2 - 4.9 g/dL    Total Protein 5.8 (L) 6.0 - 8.2 g/dL    Globulin 3.2 1.9 - 3.5 g/dL    A-G Ratio 0.8 g/dL   TSH with Reflex to FT4    Collection Time: 07/28/20  6:18 AM   Result Value Ref Range    TSH 1.310 0.380 - 5.330 uIU/mL   Vitamin D, 25-hydroxy (blood)    Collection Time: 07/28/20  6:18 AM   Result Value Ref Range    25-Hydroxy   Vitamin D 25 19 (L) 30 - 100 ng/mL   ESTIMATED GFR    Collection Time: 07/28/20  6:18 AM   Result Value Ref Range    GFR If African American >60 >60 mL/min/1.73 m 2    GFR If Non African American >60 >60 mL/min/1.73 m 2       Current Facility-Administered Medications   Medication Frequency   • morphine ER (MS CONTIN) tablet 15 mg Q12HRS   • ferrous sulfate tablet 325 mg BID WITH MEALS   • ascorbic acid tablet 500 mg BID WITH MEALS   • vitamin D (cholecalciferol) tablet 2,000 Units DAILY   • Respiratory Therapy Consult Continuous RT   • tramadol (ULTRAM) 50 MG tablet 50 mg Q4HRS PRN   • hydrALAZINE (APRESOLINE) tablet 25 mg Q8HRS PRN   • acetaminophen (TYLENOL) tablet 650 mg Q4HRS PRN   • senna-docusate (PERICOLACE or SENOKOT S) 8.6-50 MG per tablet 2 Tab BID    And   • polyethylene glycol/lytes (MIRALAX) PACKET 1 Packet QDAY PRN    And   • magnesium hydroxide (MILK OF MAGNESIA) suspension 30 mL QDAY PRN    And   • bisacodyl (DULCOLAX) suppository 10 mg QDAY PRN   •  artificial tears ophthalmic solution 1 Drop PRN   • benzocaine-menthol (CEPACOL) lozenge 1 Lozenge Q2HRS PRN   • mag hydrox-al hydrox-simeth (MAALOX PLUS ES or MYLANTA DS) suspension 20 mL Q2HRS PRN   • ondansetron (ZOFRAN ODT) dispertab 4 mg 4X/DAY PRN    Or   • ondansetron (ZOFRAN) syringe/vial injection 4 mg 4X/DAY PRN   • traZODone (DESYREL) tablet 50 mg QHS PRN   • sodium chloride (OCEAN) 0.65 % nasal spray 2 Spray PRN   • zolpidem (AMBIEN) tablet 5 mg HS PRN   • aspirin EC (ECOTRIN) tablet 81 mg DAILY   • losartan (COZAAR) tablet 25 mg DAILY   • morphine (MS IR) tablet 15 mg Q6HRS PRN   • morphine (MS IR) tablet 15 mg Q6HRS   • nicotine (NICODERM) 7 MG/24HR 7 mg Q24HRS   • omeprazole (PRILOSEC) capsule 20 mg DAILY   • pregabalin (LYRICA) capsule 150 mg TID       Orders Placed This Encounter   Procedures   • Diet Order Regular     Standing Status:   Standing     Number of Occurrences:   1     Order Specific Question:   Diet:     Answer:   Regular [1]       Assessment:  Active Hospital Problems    Diagnosis   • *S/P total knee arthroplasty, right   • Acute blood loss anemia   • Atrial fibrillation with rapid ventricular response (HCC)   • Paroxysmal atrial fibrillation (HCC)   • Pubic ramus fracture (HCC)   • Essential hypertension   • Chronic pain of right knee   • Rheumatoid arthritis (HCC)   • Anxiety disorder       Medical Decision Making and Plan:  R Knee replacement - Patient s/p right knee replacement with Dr. Pires for severe valgus deformity and insufficient MCL on 7/22/20. Post-operative course complicated by severe anemia.  -PT and OT for mobility and ADLs  -R knee immobilizer. WBAT.       RA - Multiple joint involvement. Previously on Xeljanz      Anemia - Severe post-operative anemia. Check AM CBC 8.6. Fe panel at [facility] with low Iron  -Start Fe supplement BID and Vitamin C     HTN/ Afib -  Patient on Losartan 256 mg daily.       GERD - PPI on transfer.      Chronic pain - Patient on Tylenol 1000 mg  QID, Morphine 15 mg q6h, Lyrica 150 mg TID  -Continue to have break through pain. MS Contin 15 mg BID to have baseline. Discussed about weaning off at 7-10 days post-operative     Tobacco abuse - Patient will need counseling. Currently on Nicotine patch.      Vitamin D deficiency - 19 on admission. Start 2000 U     DVT Ppx - Patient with severe anemia. Currently on ASA 81 mg    Total time:  26 minutes.  I spent greater than 50% of the time for patient care, counseling, and coordination on this date, including unit/floor time, and face-to-face time with the patient as per interval events and assessment and plan above. Topics discussed included improved pain control, ongoing swelling to RLE, and consider Lasix if no improvement    Keaton Pisano M.D.

## 2020-07-29 NOTE — THERAPY
"Physical Therapy   Daily Treatment     Patient Name: Richard Hubbard II  Age:  45 y.o., Sex:  male  Medical Record #: [id number]  Today's Date: 7/29/2020     Precautions  Precautions: (P) Fall Risk, Weight Bearing As Tolerated Right Lower Extremity, Immobilizer Right Lower Extremity  Comments: (P) No pelvic precautions; no R knee ROM at this time    Subjective    Patient seated in gym and agreeable to therapy.     Objective       07/29/20 0931   Precautions   Precautions Fall Risk;Weight Bearing As Tolerated Right Lower Extremity;Immobilizer Right Lower Extremity   Comments No pelvic precautions; no R knee ROM at this time   Gait Functional Level of Assist    Gait Level Of Assist Contact Guard Assist   Assistive Device Front Wheel Walker   Distance (Feet) 110   # of Times Distance was Traveled 1   Deviation Antalgic;Bradykinetic  (R toe out/ER position, heavy UE use)   Wheelchair Functional Level of Assist   Wheelchair Assist Supervised   Distance Wheelchair (Feet or Distance) 100   Wheelchair Description Limited by fatigue   Transfer Functional Level of Assist   Bed, Chair, Wheelchair Transfer Contact Guard Assist   Bed Chair Wheelchair Transfer Description Adaptive equipment;Increased time;Supervision for safety  (stand step transfer with FWW)   Sitting Lower Body Exercises   Ankle Pumps 1 set of 15   Other Exercises isometric quad set in supported RLE extension 2x15; seated LLE arch \"doming\" for tib posterior motor control and arch control 2x10   Interdisciplinary Plan of Care Collaboration   IDT Collaboration with  Occupational Therapist;Recreation Therapist   Patient Position at End of Therapy Seated;Self Releasing Lap Belt Applied   Collaboration Comments handoff of care from rec therapist and to OT   PT Total Time Spent   PT Individual Total Time Spent (Mins) 30   PT Charge Group   PT Gait Training 1   PT Therapeutic Exercise 1       Assessment    Patient tolerated session well, able to ambulate increased " "distances with several standing rest breaks. Initiated gentle RLE isometrics to increase activity tolerance and motor control.    Strengths: Supportive family, Motivated for self care and independence  Barriers: Limited mobility, Impaired balance, Impaired activity tolerance, Pain, Pain poorly managed, Decreased endurance, Fatigue    Plan    Gait training with FWW, transfer training, BLE strengthening with RLE in immobilizer, standing and activity tolerance, stair/curb negotiation as tolerated, standing balance.    Physical Therapy Problems     Problem: Mobility     Dates: Start: 07/28/20       Goal: STG-Within one week, patient will ambulate household distance     Dates: Start: 07/28/20       Description: 1) Individualized goal:  Patient will amb with FWW 20 ft x 2 indoors   2) Interventions:  PT Gait Training, PT Self Care/Home Eval, PT Therapeutic Exercises, PT Neuro Re-Ed/Balance, PT Aquatic Therapy, PT Therapeutic Activity, PT Manual Therapy, and PT Evaluation            Goal: STG-Within one week, patient will ascend and descend four to six stairs     Dates: Start: 07/28/20       Description: 1) Individualized goal:  Patient will amb up/down 2\" // bars 2x CGA.   2) Interventions:  PT Gait Training, PT Self Care/Home Eval, PT Therapeutic Exercises, PT Neuro Re-Ed/Balance, PT Aquatic Therapy, PT Therapeutic Activity, PT Manual Therapy, and PT Evaluation                  Problem: Mobility Transfers     Dates: Start: 07/28/20       Goal: STG-Within one week, patient will transfer bed to chair     Dates: Start: 07/28/20       Description: 1) Individualized goal:  Patient will transfer CGA SPT with FWW, SBA bed mob   2) Interventions:  PT Gait Training, PT Self Care/Home Eval, PT Therapeutic Exercises, PT Neuro Re-Ed/Balance, PT Aquatic Therapy, PT Therapeutic Activity, PT Manual Therapy, and PT Evaluation                  Problem: PT-Long Term Goals     Dates: Start: 07/28/20       Goal: LTG-By discharge, patient " will ambulate     Dates: Start: 07/28/20       Description: 1) Individualized goal:  Patient will amb >50 ft indoors mod I with FWW  2) Interventions:  PT Gait Training, PT Self Care/Home Eval, PT Therapeutic Exercises, PT Neuro Re-Ed/Balance, PT Aquatic Therapy, PT Therapeutic Activity, PT Manual Therapy, and PT Evaluation            Goal: LTG-By discharge, patient will transfer one surface to another     Dates: Start: 07/28/20       Description: 1) Individualized goal: Patient will transfer Mod I with FWW STS/SPT   2) Interventions:  PT Gait Training, PT Self Care/Home Eval, PT Therapeutic Exercises, PT Neuro Re-Ed/Balance, PT Aquatic Therapy, PT Therapeutic Activity, PT Manual Therapy, and PT Evaluation            Goal: LTG-By discharge, patient will ambulate up/down 4-6 stairs     Dates: Start: 07/28/20       Description: 1) Individualized goal:  Patient will amb up/down 2 standard stairs with no railing CGA  2) Interventions:  PT Gait Training, PT Self Care/Home Eval, PT Therapeutic Exercises, PT Neuro Re-Ed/Balance, PT Aquatic Therapy, PT Therapeutic Activity, PT Manual Therapy, and PT Evaluation

## 2020-07-29 NOTE — PROGRESS NOTES
Patient seen today.  Knee wound and swelling look good. Pain has been severe, but under better control now.  Given long opioid history will likely have ongoing issues.    He can fire quad barely.  Not able to get full extension or much flexion but very guarded.  Not participating in much ROM yet.    OK to start some gentle ROM of right knee and quad sets.  He can use the immobilizer as needed for his comfort.  OK to ambulate without it.    He can keep Prevena dressing on until the battery dies.  He may shower with wound uncovered after that as long as it is not draining.    No need to keep appts at [facility].  I am available if needed.

## 2020-07-29 NOTE — PROGRESS NOTES
Received bedside shift report from Richard PHELPS RN regarding patient and assumed care. Patient awake, calm and stable, currently positioned in bed for comfort and safety; call light within reach. Denies pain or discomfort at this time. Will continue to monitor.

## 2020-07-29 NOTE — THERAPY
"Physical Therapy   Daily Treatment     Patient Name: Richard Hubbard II  Age:  45 y.o., Sex:  male  Medical Record #: [id number]  Today's Date: 7/29/2020     Precautions  Precautions: (P) Fall Risk, Weight Bearing As Tolerated Right Lower Extremity, Immobilizer Right Lower Extremity  Comments: (P) No pelvic precautions; no R knee ROM at this time    Subjective    Patient in bed and agreeable to therapy.    \"I'm behind on my pain meds and it's really starting to hurt.\"     Objective       07/29/20 1231   Precautions   Precautions Fall Risk;Weight Bearing As Tolerated Right Lower Extremity;Immobilizer Right Lower Extremity   Comments No pelvic precautions; no R knee ROM at this time   Gait Functional Level of Assist    Gait Level Of Assist Contact Guard Assist   Assistive Device Front Wheel Walker   Distance (Feet) 100   # of Times Distance was Traveled 1   Deviation Antalgic;Bradykinetic  (R toe out/ER position, heavy UE use)   Transfer Functional Level of Assist   Bed, Chair, Wheelchair Transfer Contact Guard Assist   Bed Chair Wheelchair Transfer Description Adaptive equipment;Increased time;Supervision for safety  (stand step transfer with FWW)   Bed Mobility    Supine to Sit Stand by Assist   Sit to Supine Minimal Assist  (on therapy mat)   Sit to Stand Contact Guard Assist   Scooting Supervised   Rolling Supervised   Interdisciplinary Plan of Care Collaboration   IDT Collaboration with  Family / Caregiver   Patient Position at End of Therapy Seated;Self Releasing Lap Belt Applied;Family / Friend in Room   Collaboration Comments re: current status   PT Total Time Spent   PT Individual Total Time Spent (Mins) 60   PT Charge Group   PT Gait Training 1   PT Therapeutic Exercise 2   PT Therapeutic Activities 1     Sit<>stands x2 with FWW from elevated therapy mat attempting to improve comfort by moving RLE anteriorly, however discontinued due to significant pain.    Supine core stabilization exercises with RLE " "positioned on bolster with ice pack applied to anterior knee: alternating UE \"bird dogs\" with 3# dumbbells 2x10 each UE; oblique crunches with 3# dumbbells 2x10 each direction.    Assessment    Patient tolerated session fairly, limited by anterior knee pain, with focus of session on core stabilization with passive RLE knee extension stretch. Wife present for end of session.    Strengths: Supportive family, Motivated for self care and independence  Barriers: Limited mobility, Impaired balance, Impaired activity tolerance, Pain, Pain poorly managed, Decreased endurance, Fatigue    Plan    Gait training with FWW, transfer training, BLE strengthening with RLE in immobilizer, standing and activity tolerance, stair/curb negotiation as tolerated, standing balance.    Physical Therapy Problems     Problem: Mobility     Dates: Start: 07/28/20       Goal: STG-Within one week, patient will ambulate household distance     Dates: Start: 07/28/20       Description: 1) Individualized goal:  Patient will amb with FWW 20 ft x 2 indoors   2) Interventions:  PT Gait Training, PT Self Care/Home Eval, PT Therapeutic Exercises, PT Neuro Re-Ed/Balance, PT Aquatic Therapy, PT Therapeutic Activity, PT Manual Therapy, and PT Evaluation            Goal: STG-Within one week, patient will ascend and descend four to six stairs     Dates: Start: 07/28/20       Description: 1) Individualized goal:  Patient will amb up/down 2\" // bars 2x CGA.   2) Interventions:  PT Gait Training, PT Self Care/Home Eval, PT Therapeutic Exercises, PT Neuro Re-Ed/Balance, PT Aquatic Therapy, PT Therapeutic Activity, PT Manual Therapy, and PT Evaluation                  Problem: Mobility Transfers     Dates: Start: 07/28/20       Goal: STG-Within one week, patient will transfer bed to chair     Dates: Start: 07/28/20       Description: 1) Individualized goal:  Patient will transfer CGA SPT with FWW, SBA bed mob   2) Interventions:  PT Gait Training, PT Self Care/Home " Eval, PT Therapeutic Exercises, PT Neuro Re-Ed/Balance, PT Aquatic Therapy, PT Therapeutic Activity, PT Manual Therapy, and PT Evaluation                  Problem: PT-Long Term Goals     Dates: Start: 07/28/20       Goal: LTG-By discharge, patient will ambulate     Dates: Start: 07/28/20       Description: 1) Individualized goal:  Patient will amb >50 ft indoors mod I with FWW  2) Interventions:  PT Gait Training, PT Self Care/Home Eval, PT Therapeutic Exercises, PT Neuro Re-Ed/Balance, PT Aquatic Therapy, PT Therapeutic Activity, PT Manual Therapy, and PT Evaluation            Goal: LTG-By discharge, patient will transfer one surface to another     Dates: Start: 07/28/20       Description: 1) Individualized goal: Patient will transfer Mod I with FWW STS/SPT   2) Interventions:  PT Gait Training, PT Self Care/Home Eval, PT Therapeutic Exercises, PT Neuro Re-Ed/Balance, PT Aquatic Therapy, PT Therapeutic Activity, PT Manual Therapy, and PT Evaluation            Goal: LTG-By discharge, patient will ambulate up/down 4-6 stairs     Dates: Start: 07/28/20       Description: 1) Individualized goal:  Patient will amb up/down 2 standard stairs with no railing CGA  2) Interventions:  PT Gait Training, PT Self Care/Home Eval, PT Therapeutic Exercises, PT Neuro Re-Ed/Balance, PT Aquatic Therapy, PT Therapeutic Activity, PT Manual Therapy, and PT Evaluation

## 2020-07-29 NOTE — THERAPY
"Occupational Therapy  Daily Treatment     Patient Name: Richard Hubbard II  Age:  45 y.o., Sex:  male  Medical Record #: [id number]  Today's Date: 7/29/2020     Precautions  Precautions: (P) Fall Risk, Weight Bearing As Tolerated Right Lower Extremity, Immobilizer Right Lower Extremity  Comments: No pelvic precautions; no R knee ROM at this time    Safety   ADL Safety : (P) Requires Physical Assist for Safety  Bathroom Safety: (P) Requires Physical Assist for Safety    Subjective    \" I think I am just going to stand.\" referring to showering   patient ended up sitting on shower seat due to fatigue.      Objective       07/29/20 1001   Precautions   Precautions Fall Risk;Weight Bearing As Tolerated Right Lower Extremity;Immobilizer Right Lower Extremity   Safety    ADL Safety  Requires Physical Assist for Safety   Bathroom Safety Requires Physical Assist for Safety   Functional Level of Assist   Grooming Supervision  (setup seated  oral care shave  comb hair )   Bathing Minimal Assist  (wash/ dry right LE )   Bathing Description Grab bar;Hand held shower;Tub bench  (utilized shower seat for increased seat height )   Upper Body Dressing Supervision  (setup )   Lower Body Dressing Minimal Assist  (doff/ don shorts and underwear of right LE )   Bed, Chair, Wheelchair Transfer Minimal Assist  (assist right LE into bed )   Tub / Shower Transfers Contact Guard Assist   Tub Shower Transfer Description Grab bar   Interdisciplinary Plan of Care Collaboration   IDT Collaboration with  Nursing   Patient Position at End of Therapy In Bed;Call Light within Reach;Tray Table within Reach  (Dr Pisano rounding with patient )   Collaboration Comments  Ace wraps for right LE    OT Total Time Spent   OT Individual Total Time Spent (Mins) 60   OT Charge Group   OT Self Care / ADL 4       Assessment     shower and transfer performed with  Immobilizer off.     patient with fair consistency of not bending right knee during shower  " transfer and sit <-> stand with bathing and LB dressing      may need  To have immobilizer on for these tasks  Or at least for shower transfer  If unable to maintain no bending his right knee     Strengths: Able to follow instructions, Alert and oriented, Independent prior level of function, Effective communication skills, Pleasant and cooperative, Supportive family  Barriers: Fatigue, Decreased endurance, Generalized weakness, Impaired activity tolerance, Impaired balance, Pain    Plan     ADL IADL , related mobility AE training   Strength/endurance building  Standing tolerance and balance activity       Occupational Therapy Goals     Problem: Dressing     Dates: Start: 07/28/20       Goal: STG-Within one week, patient will dress LB     Dates: Start: 07/28/20       Description: 1) Individualized Goal:  Min assist with AE as needed  2) Interventions:  OT Self Care/ADL, OT Manual Ther Technique, OT Neuro Re-Ed/Balance, OT Sensory Int Techniques, OT Therapeutic Activity, OT Evaluation, and OT Therapeutic Exercise                  Problem: Functional Transfers     Dates: Start: 07/28/20       Description: 1) Individualized Goal:  CGA    2) Interventions:  OT Self Care/ADL, OT Manual Ther Technique, OT Neuro Re-Ed/Balance, OT Sensory Int Techniques, OT Therapeutic Activity, OT Evaluation, and OT Therapeutic Exercise    Goal: STG-Within one week, patient will transfer to toilet     Dates: Start: 07/28/20       Description: 1) Individualized Goal:  CGA    2) Interventions:  OT Self Care/ADL, OT Manual Ther Technique, OT Neuro Re-Ed/Balance, OT Sensory Int Techniques, OT Therapeutic Activity, OT Evaluation, and OT Therapeutic Exercise          Goal: STG-Within one week, patient will transfer to step in shower     Dates: Start: 07/28/20       Description: 1) Individualized Goal:  CGA    2) Interventions:  OT Self Care/ADL, OT Manual Ther Technique, OT Neuro Re-Ed/Balance, OT Sensory Int Techniques, OT Therapeutic Activity,  OT Evaluation, and OT Therapeutic Exercise                Problem: IADL's     Dates: Start: 07/28/20       Goal: STG-Within one week, patient will access kitchen area     Dates: Start: 07/28/20       Description: 1) Individualized Goal:  Min assist with FWW    2) Interventions:  OT Self Care/ADL, OT Manual Ther Technique, OT Neuro Re-Ed/Balance, OT Sensory Int Techniques, OT Therapeutic Activity, OT Evaluation, and OT Therapeutic Exercise                Problem: OT Long Term Goals     Dates: Start: 07/28/20       Goal: LTG-By discharge, patient will complete basic self care tasks     Dates: Start: 07/28/20       Description: 1) Individualized Goal:  Mod I with AE as needed    2) Interventions:  OT Self Care/ADL, OT Manual Ther Technique, OT Neuro Re-Ed/Balance, OT Sensory Int Techniques, OT Therapeutic Activity, OT Evaluation, and OT Therapeutic Exercise          Goal: LTG-By discharge, patient will perform bathroom transfers     Dates: Start: 07/28/20       Description: 1) Individualized Goal:  Mod I   2) Interventions:  OT Self Care/ADL, OT Manual Ther Technique, OT Neuro Re-Ed/Balance, OT Sensory Int Techniques, OT Therapeutic Activity, OT Evaluation, and OT Therapeutic Exercise

## 2020-07-29 NOTE — THERAPY
Recreational Therapy   Initial Evaluation     Patient Name: Richard Hubbard II  Age:  45 y.o., Sex:  male  Medical Record #: [id number]  Today's Date: 7/29/2020     Subjective    Pt reporting increase in mood due to the distraction of the activity however no change in pain.     Objective       07/28/20 1501   Leisure History   Leisure Interests Sports;Television   Leisure Comments Pt enjoys playing with his children   Pre-Morbid Leisure Lifestyle Moderately Active;Individual   Prior Living Arrangements   Lives with - Patient's Self Care Capacity Spouse;Child Less than 18 Years of Age   Steps Into Home 2   Steps In Home 0   Ambulation Independent   Assistive Devices Used Front-Wheel Walker   Driving / Transportation Driving Independent   Functional Ability Status - Physical   Endurance Low   Right  Weak  (functional)   Left  Strong   Right Arm Strong   Left Arm Strong   Right Leg Weak  (R knee replacement)   Left Leg Strong   Upper Extremity Gross Motor Uses Both Arms / Hands   Lower Extremetiy Gross Motor Uses Both Legs  (total R knee arthoplasty)   Fine Motor Manipulates Small Objects   Functional Ability Status - Cognitive   Attention Span Remains on Task   Comprehension Follows Three Step Commands   Judgment Able to Make Independent Decisions   Functional Ability Status - Emotional    Affect Appropriate   Mood Appropriate   Behavior Appropriate   Leisure Competence Measure   Leisure Awareness Independent   Leisure Attitude Independent   Leisure Skills Moderate Assist   Cultural / Social Behaviors Independent   Interpersonal Skills Independent   Community Integration Skills Minimal Assist   Social Contact Independent   Community Participation Minimal Assist   Clinical Impression   Clinical Impression Impaired Gross Motor Leisure Functioning;Impaired Community Skills;Impaired Relaxation and Coping Skills;Impaired Leisure Skills   Current Discharge Plan   Current Discharge Plan Return to Prior Living  Situation   Benefit    Benefit Patient would Benefit from Inpatient Recreational Therapy to Maximize Independent Leisure Functioning    Interdisciplinary Plan of Care Collaboration   IDT Collaboration with  Physical Therapist   Patient Position at End of Therapy In Bed;Call Light within Reach;Tray Table within Reach   Collaboration Comments Recieved care from PT   Strengths & Barriers   Strengths Able to follow instructions;Alert and oriented;Effective communication skills;Good carryover of learning;Good insight into deficits/needs;Motivated for self care and independence;Pleasant and cooperative;Supportive family;Willingly participates in therapeutic activities   Barriers Pain;Impaired activity tolerance;Impulsive   Treatment Time   Total Time Spent (mins) 30   Procedural Tracking   Procedural Tracking Community Re-Integration;Leisure Skills Awareness       Assessment  Patient is 45 y.o. male with a diagnosis of S/P total knee arthroplasty, right.  Additional factors influencing patient status / progress (ie: cognitive factors, co-morbidities, social support, etc): BPD, GERD, MONICA, A fib and severe RA. Primary care giver for children.         Plan  Recommend Recreational Therapy 30-60 minutes per day 3-4 days per week for 2 weeks for the following treatments:  Community Re-Integration, Community Skills Development, Leisure Skills Awareness, Leisure Skills Development, Cognitive Skills Training, Gross Motor Functional Leisure Skills and Fine Motor Functional Leisure Skills    Goals:  Long term and short term goals have been discussed with patient and they are in agreement.    Recreation Therapy Problems     Problem: Recreation Therapy     Dates: Start: 07/29/20       Goal: STG-Within one week, patient will demonstrate leisure problem solving     Dates: Start: 07/29/20       Description: By sharing on two leisure activities he would like to participate in either his sessions or in her free time and any perceived  barriers to her participation.           Goal: STG-Within one week, patient will     Dates: Start: 07/29/20       Description: Participate in           Goal: LTG-By discharge, patient will demonstrate leisure problem solving     Dates: Start: 07/29/20       Description: By sharing on two leisure activities he would like to participate in following discharge and any perceived barriers to her participation.           Goal: LTG-By discharge, patient will     Dates: Start: 07/29/20

## 2020-07-29 NOTE — PROGRESS NOTES
Received bedside shift report from Princess GINA RODRIGUEZ RN regarding patient and assumed care. Patient awake, calm and stable, currently positioned in bed for comfort and safety; call light within reach. Denies pain or discomfort at this time. Will continue to monitor.

## 2020-07-30 PROCEDURE — 97110 THERAPEUTIC EXERCISES: CPT

## 2020-07-30 PROCEDURE — 700102 HCHG RX REV CODE 250 W/ 637 OVERRIDE(OP): Performed by: PHYSICAL MEDICINE & REHABILITATION

## 2020-07-30 PROCEDURE — A9270 NON-COVERED ITEM OR SERVICE: HCPCS | Performed by: PHYSICAL MEDICINE & REHABILITATION

## 2020-07-30 PROCEDURE — 99233 SBSQ HOSP IP/OBS HIGH 50: CPT | Performed by: PHYSICAL MEDICINE & REHABILITATION

## 2020-07-30 PROCEDURE — 97535 SELF CARE MNGMENT TRAINING: CPT

## 2020-07-30 PROCEDURE — 770010 HCHG ROOM/CARE - REHAB SEMI PRIVAT*

## 2020-07-30 PROCEDURE — 97530 THERAPEUTIC ACTIVITIES: CPT

## 2020-07-30 RX ADMIN — PREGABALIN 150 MG: 150 CAPSULE ORAL at 21:16

## 2020-07-30 RX ADMIN — ZOLPIDEM TARTRATE 5 MG: 5 TABLET ORAL at 23:26

## 2020-07-30 RX ADMIN — TRAMADOL HYDROCHLORIDE 50 MG: 50 TABLET, COATED ORAL at 11:37

## 2020-07-30 RX ADMIN — Medication 2000 UNITS: at 08:04

## 2020-07-30 RX ADMIN — DOCUSATE SODIUM 50 MG AND SENNOSIDES 8.6 MG 2 TABLET: 8.6; 5 TABLET, FILM COATED ORAL at 09:00

## 2020-07-30 RX ADMIN — ASPIRIN 81 MG: 81 TABLET, COATED ORAL at 08:04

## 2020-07-30 RX ADMIN — MORPHINE SULFATE 15 MG: 15 TABLET ORAL at 15:10

## 2020-07-30 RX ADMIN — MORPHINE SULFATE 15 MG: 15 TABLET ORAL at 01:07

## 2020-07-30 RX ADMIN — TRAMADOL HYDROCHLORIDE 50 MG: 50 TABLET, COATED ORAL at 17:37

## 2020-07-30 RX ADMIN — PREGABALIN 150 MG: 150 CAPSULE ORAL at 15:10

## 2020-07-30 RX ADMIN — Medication 500 MG: at 17:36

## 2020-07-30 RX ADMIN — MORPHINE SULFATE 15 MG: 15 TABLET ORAL at 08:04

## 2020-07-30 RX ADMIN — NICOTINE 7 MG: 7 PATCH, EXTENDED RELEASE TRANSDERMAL at 17:36

## 2020-07-30 RX ADMIN — PREGABALIN 150 MG: 150 CAPSULE ORAL at 08:04

## 2020-07-30 RX ADMIN — MORPHINE SULFATE 15 MG: 15 TABLET, FILM COATED, EXTENDED RELEASE ORAL at 08:04

## 2020-07-30 RX ADMIN — MORPHINE SULFATE 15 MG: 15 TABLET ORAL at 23:26

## 2020-07-30 RX ADMIN — MORPHINE SULFATE 15 MG: 15 TABLET ORAL at 16:22

## 2020-07-30 RX ADMIN — MORPHINE SULFATE 15 MG: 15 TABLET, FILM COATED, EXTENDED RELEASE ORAL at 21:18

## 2020-07-30 RX ADMIN — FERROUS SULFATE TAB 325 MG (65 MG ELEMENTAL FE) 325 MG: 325 (65 FE) TAB at 08:04

## 2020-07-30 RX ADMIN — MORPHINE SULFATE 15 MG: 15 TABLET ORAL at 08:46

## 2020-07-30 RX ADMIN — Medication 500 MG: at 08:04

## 2020-07-30 RX ADMIN — FERROUS SULFATE TAB 325 MG (65 MG ELEMENTAL FE) 325 MG: 325 (65 FE) TAB at 17:37

## 2020-07-30 RX ADMIN — MORPHINE SULFATE 15 MG: 15 TABLET ORAL at 21:18

## 2020-07-30 RX ADMIN — OMEPRAZOLE 20 MG: 20 CAPSULE, DELAYED RELEASE ORAL at 08:04

## 2020-07-30 RX ADMIN — DOCUSATE SODIUM 50 MG AND SENNOSIDES 8.6 MG 2 TABLET: 8.6; 5 TABLET, FILM COATED ORAL at 21:18

## 2020-07-30 RX ADMIN — MAGNESIUM HYDROXIDE 30 ML: 400 SUSPENSION ORAL at 15:10

## 2020-07-30 RX ADMIN — MORPHINE SULFATE 15 MG: 15 TABLET ORAL at 03:01

## 2020-07-30 ASSESSMENT — ACTIVITIES OF DAILY LIVING (ADL)
BED_CHAIR_WHEELCHAIR_TRANSFER_DESCRIPTION: ADAPTIVE EQUIPMENT;SUPERVISION FOR SAFETY
TOILET_TRANSFER_DESCRIPTION: GRAB BAR;INCREASED TIME;SET-UP OF EQUIPMENT;SUPERVISION FOR SAFETY;VERBAL CUEING
TOILETING_LEVEL_OF_ASSIST_DESCRIPTION: INCREASED TIME;GRAB BAR;SUPERVISION FOR SAFETY;SET-UP OF EQUIPMENT
BED_CHAIR_WHEELCHAIR_TRANSFER_DESCRIPTION: ADAPTIVE EQUIPMENT;SUPERVISION FOR SAFETY

## 2020-07-30 NOTE — CARE PLAN
Pain being managed well with scheduled meds and prn tramadol and morphine. Pt well educated on pain and not letting pain level get up too high before asking for intervention, uses ice packs as well. Pt understands new immobilizer instructions and wound vac as per MD order. Not impulsive and calls for help appropriately.     Problem: Communication  Goal: The ability to communicate needs accurately and effectively will improve  Description: Patient able to verbalize needs.  Will continue to monitor.   Outcome: PROGRESSING AS EXPECTED     Problem: Safety  Goal: Will remain free from injury  Description: Pt uses call light consistently and appropriately. Waits for assistance does not attempt self transfer this shift. Able to verbalize needs.   Outcome: PROGRESSING AS EXPECTED  Goal: Will remain free from falls  Outcome: PROGRESSING AS EXPECTED     Problem: Pain Management  Goal: Pain level will decrease to patient's comfort goal  Outcome: PROGRESSING AS EXPECTED

## 2020-07-30 NOTE — PROGRESS NOTES
Patient requested to have the 2nd dose of ambient.per patient he is having difficulty sleeping. Explained to patient the risk of taking ambient with  Morphine but patient verbalized he is aware of the risk and still wants to have it.

## 2020-07-30 NOTE — PROGRESS NOTES
Received patient on bed. Conscious coherent not in cp distress. Patient verbalized been having moderate pain controlled by PRN morphine and schedule Morphine IR. Per patient he  Requires 30 mg Morphine IR every 6 hours to control pain plus MS contin. Per patient aware of the risk but said pain is only controlled by that does prior to hospitalization . Explained to patient the risk of taking too much PRN and scheduled medication but he said he needed it. Patient verbalized no significant changes in CMS status. Safety and fall precaution reinforced..

## 2020-07-30 NOTE — CARE PLAN
Problem: Safety  Goal: Will remain free from injury  Description: Pt uses call light consistently and appropriately. Waits for assistance does not attempt self transfer this shift. Able to verbalize needs.   Outcome: PROGRESSING AS EXPECTED  Note: Patient verbalized will not get up without assist. Call button within reach.      Problem: Bowel/Gastric:  Goal: Normal bowel function is maintained or improved  Outcome: PROGRESSING SLOWER THAN EXPECTED  Note: Per patient had a BM on Tuesday. Encouraged patient to increase fluid intake and explain that narcotic will make him constipated. Will offer PRN BM meds.

## 2020-07-30 NOTE — THERAPY
"Physical Therapy   Daily Treatment     Patient Name: Richard Hubbard II  Age:  45 y.o., Sex:  male  Medical Record #: [id number]  Today's Date: 7/30/2020     Precautions  Precautions: (P) Fall Risk, Weight Bearing As Tolerated Right Lower Extremity  Comments: (P) No pelvic precautions, ok for gentle ROM and quad isometrics, knee immobilizer per patient preference    Subjective    Patient in bed and agreeable to therapy.    \"The doctor came yesterday and said I didn't have to wear the immobilizer, but it kind of makes me nervous.\"     Objective       07/30/20 0731   Precautions   Precautions Fall Risk;Weight Bearing As Tolerated Right Lower Extremity   Comments No pelvic precautions, ok for gentle ROM and quad isometrics, knee immobilizer per patient preference   Transfer Functional Level of Assist   Bed, Chair, Wheelchair Transfer Contact Guard Assist   Bed Chair Wheelchair Transfer Description Adaptive equipment;Supervision for safety  (stand step transfer with FWW)   Sitting Lower Body Exercises   Hamstring Curl 2 sets of 10  (with RLE on towel to patient tolerance)   Bed Mobility    Supine to Sit Stand by Assist   Sit to Stand Stand by Assist   Scooting Supervised   Rolling Supervised   Interdisciplinary Plan of Care Collaboration   Patient Position at End of Therapy Seated;Call Light within Reach;Phone within Reach;Tray Table within Reach  (ice pack applied to R knee)   PT Total Time Spent   PT Individual Total Time Spent (Mins) 30   PT Charge Group   PT Therapeutic Exercise 1   PT Therapeutic Activities 1     LB dressing seated EOB with dressing stick and SBA. Able to stand with FWW to complete task with no UE support and weight shifted heavily onto LLE.    Assessment    Patient tolerated session well, reporting poor night's sleep due to shooting RLE pain. Reviewed new orders per surgeon with patient and initiated gentle ROM to patient's tolerance. Patient tearful at times, stating he is emotionally " "overwhelmed thinking about being able to keep up with his children while being in pain. Education and emotional support provided, encouraging patient to continue working hard in therapy.  Strengths: Supportive family, Motivated for self care and independence  Barriers: Limited mobility, Impaired balance, Impaired activity tolerance, Pain, Pain poorly managed, Decreased endurance, Fatigue    Plan    Gait training with FWW, transfer training, BLE strengthening, gentle ROM to patient tolerance, standing and activity tolerance, stair/curb negotiation as tolerated, standing balance.    Physical Therapy Problems     Problem: Mobility     Dates: Start: 07/28/20       Goal: STG-Within one week, patient will ambulate household distance     Dates: Start: 07/28/20       Description: 1) Individualized goal:  Patient will amb with FWW 20 ft x 2 indoors   2) Interventions:  PT Gait Training, PT Self Care/Home Eval, PT Therapeutic Exercises, PT Neuro Re-Ed/Balance, PT Aquatic Therapy, PT Therapeutic Activity, PT Manual Therapy, and PT Evaluation            Goal: STG-Within one week, patient will ascend and descend four to six stairs     Dates: Start: 07/28/20       Description: 1) Individualized goal:  Patient will amb up/down 2\" // bars 2x CGA.   2) Interventions:  PT Gait Training, PT Self Care/Home Eval, PT Therapeutic Exercises, PT Neuro Re-Ed/Balance, PT Aquatic Therapy, PT Therapeutic Activity, PT Manual Therapy, and PT Evaluation                  Problem: Mobility Transfers     Dates: Start: 07/28/20       Goal: STG-Within one week, patient will transfer bed to chair     Dates: Start: 07/28/20       Description: 1) Individualized goal:  Patient will transfer CGA SPT with FWW, SBA bed mob   2) Interventions:  PT Gait Training, PT Self Care/Home Eval, PT Therapeutic Exercises, PT Neuro Re-Ed/Balance, PT Aquatic Therapy, PT Therapeutic Activity, PT Manual Therapy, and PT Evaluation                  Problem: PT-Long Term Goals  "    Dates: Start: 07/28/20       Goal: LTG-By discharge, patient will ambulate     Dates: Start: 07/28/20       Description: 1) Individualized goal:  Patient will amb >50 ft indoors mod I with FWW  2) Interventions:  PT Gait Training, PT Self Care/Home Eval, PT Therapeutic Exercises, PT Neuro Re-Ed/Balance, PT Aquatic Therapy, PT Therapeutic Activity, PT Manual Therapy, and PT Evaluation            Goal: LTG-By discharge, patient will transfer one surface to another     Dates: Start: 07/28/20       Description: 1) Individualized goal: Patient will transfer Mod I with FWW STS/SPT   2) Interventions:  PT Gait Training, PT Self Care/Home Eval, PT Therapeutic Exercises, PT Neuro Re-Ed/Balance, PT Aquatic Therapy, PT Therapeutic Activity, PT Manual Therapy, and PT Evaluation            Goal: LTG-By discharge, patient will ambulate up/down 4-6 stairs     Dates: Start: 07/28/20       Description: 1) Individualized goal:  Patient will amb up/down 2 standard stairs with no railing CGA  2) Interventions:  PT Gait Training, PT Self Care/Home Eval, PT Therapeutic Exercises, PT Neuro Re-Ed/Balance, PT Aquatic Therapy, PT Therapeutic Activity, PT Manual Therapy, and PT Evaluation

## 2020-07-30 NOTE — THERAPY
"Occupational Therapy  Daily Treatment     Patient Name: Richard Hubbard II  Age:  45 y.o., Sex:  male  Medical Record #: [id number]  Today's Date: 7/30/2020     Precautions  Precautions: (P) Fall Risk, Weight Bearing As Tolerated Right Lower Extremity  Comments: (P) No pelvic precautions, ok for gentle ROM and quad isometrics, knee immobilizer per patient preference    Safety   ADL Safety : (P) Requires Supervision for Safety  Bathroom Safety: (P) Requires Supervision for Safety  Comments: (P) see below notes for ADL performance details.    Subjective    \"I need to try to use the bathroom\"     Objective       07/30/20 1401   Precautions   Precautions Fall Risk;Weight Bearing As Tolerated Right Lower Extremity   Comments No pelvic precautions, ok for gentle ROM and quad isometrics, knee immobilizer per patient preference   Safety    ADL Safety  Requires Supervision for Safety   Bathroom Safety Requires Supervision for Safety   Comments see below notes for ADL performance details.   Pain   Intervention Distraction;Repositioned;Rest   Pain 0 - 10 Group   Location Knee   Location Orientation Right   Pain Rating Scale (NPRS) 5   Description Aching;Sore   Therapist Pain Assessment Prior to Activity;During Activity;Post Activity   Cognition    Level of Consciousness Alert   Functional Level of Assist   Grooming Supervision   Grooming Description Seated in wheelchair at sink   Toileting Stand by Assist   Toileting Description Increased time;Grab bar;Supervision for safety;Set-up of equipment  (supv while standing for clothing mgt)   Toilet Transfers Contact Guard Assist   Toilet Transfer Description Grab bar;Increased time;Set-up of equipment;Supervision for safety;Verbal cueing  (w/c<>toilet SPT with GB and CGA)   Sitting Upper Body Exercises   Tricep Press 3 sets of 15;Bilateral;Weight (See Comments for lbs)  (Rickshaw facing fwd with 25#)   Upper Extremity Bike Level 5 Resistance  (x5 min for BUE ROM/strength)   Bed " Mobility    Supine to Sit Stand by Assist   Sit to Supine Stand by Assist   Scooting Stand by Assist   Rolling Supervised   Interdisciplinary Plan of Care Collaboration   IDT Collaboration with  Nursing   Patient Position at End of Therapy In Bed;Call Light within Reach;Tray Table within Reach;Phone within Reach   Collaboration Comments CLOF, POC, pain   OT Total Time Spent   OT Individual Total Time Spent (Mins) 60   OT Charge Group   OT Self Care / ADL 1   OT Therapy Activity 1   OT Therapeutic Exercise  2     Walking in // bars x2 laps and static standing for ladder ball toss to encourage RLE weightbearing 2x10 reps with CGA-SBA.     Assessment    Pt tolerated session fair. He was limited by reports of RLE pain and constipation, but motivated and participatory to best of his ability. Pt demonstrates difficulty with RLE weightbearing and relies heavily on UB support in standing. Pt able to complete toileting and transfer from w/c level with CGA-Supervision and assist with set up of w/c.     Strengths: Able to follow instructions, Alert and oriented, Independent prior level of function, Effective communication skills, Pleasant and cooperative, Supportive family  Barriers: Fatigue, Decreased endurance, Generalized weakness, Impaired activity tolerance, Impaired balance, Pain    Plan    ADL/IADLs and related functional mobility, AE training, R /pinch strengthening, standing tolerance/balance.     Occupational Therapy Goals     Problem: Dressing     Dates: Start: 07/28/20       Goal: STG-Within one week, patient will dress LB     Dates: Start: 07/28/20       Description: 1) Individualized Goal:  Min assist with AE as needed  2) Interventions:  OT Self Care/ADL, OT Manual Ther Technique, OT Neuro Re-Ed/Balance, OT Sensory Int Techniques, OT Therapeutic Activity, OT Evaluation, and OT Therapeutic Exercise      Note:     Goal Note filed on 07/30/20 1102 by Wojciech Clements C.O.T.A.    Patient Min assist to don  underwear and pants     Requires mod assist for right sock and shoe   Limited by decreased strength/ endurance  limited ROM  right knee                         Problem: Functional Transfers     Dates: Start: 07/28/20       Description: 1) Individualized Goal:  CGA    2) Interventions:  OT Self Care/ADL, OT Manual Ther Technique, OT Neuro Re-Ed/Balance, OT Sensory Int Techniques, OT Therapeutic Activity, OT Evaluation, and OT Therapeutic Exercise    Goal: STG-Within one week, patient will transfer to toilet     Dates: Start: 07/28/20       Description: 1) Individualized Goal:  CGA    2) Interventions:  OT Self Care/ADL, OT Manual Ther Technique, OT Neuro Re-Ed/Balance, OT Sensory Int Techniques, OT Therapeutic Activity, OT Evaluation, and OT Therapeutic Exercise    Note:     Goal Note filed on 07/30/20 1102 by Wojciech Clements, C.O.T.A.    Not yet observed                           Problem: IADL's     Dates: Start: 07/28/20       Goal: STG-Within one week, patient will access kitchen area     Dates: Start: 07/28/20       Description: 1) Individualized Goal:  Min assist with FWW    2) Interventions:  OT Self Care/ADL, OT Manual Ther Technique, OT Neuro Re-Ed/Balance, OT Sensory Int Techniques, OT Therapeutic Activity, OT Evaluation, and OT Therapeutic Exercise    Note:     Goal Note filed on 07/30/20 1102 by Wojciech Clements, C.O.T.A.    Not yet addressed                            Problem: OT Long Term Goals     Dates: Start: 07/28/20       Goal: LTG-By discharge, patient will complete basic self care tasks     Dates: Start: 07/28/20       Description: 1) Individualized Goal:  Mod I with AE as needed    2) Interventions:  OT Self Care/ADL, OT Manual Ther Technique, OT Neuro Re-Ed/Balance, OT Sensory Int Techniques, OT Therapeutic Activity, OT Evaluation, and OT Therapeutic Exercise          Goal: LTG-By discharge, patient will perform bathroom transfers     Dates: Start: 07/28/20       Description: 1) Individualized  Goal:  Mod I   2) Interventions:  OT Self Care/ADL, OT Manual Ther Technique, OT Neuro Re-Ed/Balance, OT Sensory Int Techniques, OT Therapeutic Activity, OT Evaluation, and OT Therapeutic Exercise

## 2020-07-30 NOTE — CARE PLAN
Problem: Dressing  Goal: STG-Within one week, patient will dress LB  Description: 1) Individualized Goal:  Min assist with AE as needed  2) Interventions:  OT Self Care/ADL, OT Manual Ther Technique, OT Neuro Re-Ed/Balance, OT Sensory Int Techniques, OT Therapeutic Activity, OT Evaluation, and OT Therapeutic Exercise    Outcome: NOT MET  Note: Patient Min assist to don underwear and pants     Requires mod assist for right sock and shoe   Limited by decreased strength/ endurance  limited ROM  right knee      Problem: Functional Transfers  Goal: STG-Within one week, patient will transfer to toilet  Description: 1) Individualized Goal:  CGA    2) Interventions:  OT Self Care/ADL, OT Manual Ther Technique, OT Neuro Re-Ed/Balance, OT Sensory Int Techniques, OT Therapeutic Activity, OT Evaluation, and OT Therapeutic Exercise  Outcome: NOT MET  Note: Not yet observed        Problem: IADL's  Goal: STG-Within one week, patient will access kitchen area  Description: 1) Individualized Goal:  Min assist with FWW    2) Interventions:  OT Self Care/ADL, OT Manual Ther Technique, OT Neuro Re-Ed/Balance, OT Sensory Int Techniques, OT Therapeutic Activity, OT Evaluation, and OT Therapeutic Exercise  Outcome: NOT MET  Note: Not yet addressed

## 2020-07-30 NOTE — THERAPY
Physical Therapy   Daily Treatment     Patient Name: Richard Hubbard II  Age:  45 y.o., Sex:  male  Medical Record #: [id number]  Today's Date: 7/30/2020     Precautions  Precautions: (P) Fall Risk, Weight Bearing As Tolerated Right Lower Extremity  Comments: (P) No pelvic precautions, ok for gentle ROM and quad isometrics, knee immobilizer per patient preference    Subjective    Patient seated EOB, agreeable to therapy despite significant knee pain.     Objective       07/30/20 1001   Precautions   Precautions Fall Risk;Weight Bearing As Tolerated Right Lower Extremity   Comments No pelvic precautions, ok for gentle ROM and quad isometrics, knee immobilizer per patient preference   Pain 0 - 10 Group   Location Knee   Location Orientation Right   Pain Rating Scale (NPRS) 9   Description Shooting   Wheelchair Functional Level of Assist   Wheelchair Assist Supervised   Distance Wheelchair (Feet or Distance) 100   Transfer Functional Level of Assist   Bed, Chair, Wheelchair Transfer Contact Guard Assist   Bed Chair Wheelchair Transfer Description Adaptive equipment;Supervision for safety  (stand step transfer with FWW)   Supine Lower Body Exercise   Heel Slide 2 sets of 10;Right  (partial range on bolster)   Quadriceps Isometrics 3 sets of 15;Right  (RLE elevated on bolster)   Bed Mobility    Supine to Sit Stand by Assist   Sit to Supine Stand by Assist   Sit to Stand Stand by Assist   Scooting Supervised   Rolling Supervised   Interdisciplinary Plan of Care Collaboration   IDT Collaboration with  Occupational Therapist   Patient Position at End of Therapy Seated  (with OT in gym)   Collaboration Comments patient care handoff to OT   PT Total Time Spent   PT Individual Total Time Spent (Mins) 60   PT Charge Group   PT Therapeutic Exercise 2   PT Therapeutic Activities 2     Ice massage to R knee (medial, lateral and distal quad) while leg elevated on bolster for prolonged passive knee extension stretch for pain  "management and to facilitate improved ability to activate quads.    Assessment    Patient tolerated session fairly, declined ambulation today due to knee pain. Education provided regarding importance of ROM and strengthening exercises with patient verbalizing understanding of education however continues to require encouragement due to severity of pain.    Strengths: Supportive family, Motivated for self care and independence  Barriers: Limited mobility, Impaired balance, Impaired activity tolerance, Pain, Pain poorly managed, Decreased endurance, Fatigue    Plan    Gait training with FWW, transfer training, BLE strengthening, gentle ROM to patient tolerance, standing and activity tolerance, stair/curb negotiation as tolerated, standing balance.    Physical Therapy Problems     Problem: Mobility     Dates: Start: 07/28/20       Goal: STG-Within one week, patient will ascend and descend four to six stairs     Dates: Start: 07/28/20       Description: 1) Individualized goal:  Patient will amb up/down 2\" // bars 2x CGA.   2) Interventions:  PT Gait Training, PT Self Care/Home Eval, PT Therapeutic Exercises, PT Neuro Re-Ed/Balance, PT Aquatic Therapy, PT Therapeutic Activity, PT Manual Therapy, and PT Evaluation                  Problem: Mobility Transfers     Dates: Start: 07/28/20       Goal: STG-Within one week, patient will transfer bed to chair     Dates: Start: 07/28/20       Description: 1) Individualized goal:  Patient will transfer CGA SPT with FWW, SBA bed mob   2) Interventions:  PT Gait Training, PT Self Care/Home Eval, PT Therapeutic Exercises, PT Neuro Re-Ed/Balance, PT Aquatic Therapy, PT Therapeutic Activity, PT Manual Therapy, and PT Evaluation                  Problem: PT-Long Term Goals     Dates: Start: 07/28/20       Goal: LTG-By discharge, patient will ambulate     Dates: Start: 07/28/20       Description: 1) Individualized goal:  Patient will amb >50 ft indoors mod I with FWW  2) Interventions:  PT " Gait Training, PT Self Care/Home Eval, PT Therapeutic Exercises, PT Neuro Re-Ed/Balance, PT Aquatic Therapy, PT Therapeutic Activity, PT Manual Therapy, and PT Evaluation            Goal: LTG-By discharge, patient will transfer one surface to another     Dates: Start: 07/28/20       Description: 1) Individualized goal: Patient will transfer Mod I with FWW STS/SPT   2) Interventions:  PT Gait Training, PT Self Care/Home Eval, PT Therapeutic Exercises, PT Neuro Re-Ed/Balance, PT Aquatic Therapy, PT Therapeutic Activity, PT Manual Therapy, and PT Evaluation            Goal: LTG-By discharge, patient will ambulate up/down 4-6 stairs     Dates: Start: 07/28/20       Description: 1) Individualized goal:  Patient will amb up/down 2 standard stairs with no railing CGA  2) Interventions:  PT Gait Training, PT Self Care/Home Eval, PT Therapeutic Exercises, PT Neuro Re-Ed/Balance, PT Aquatic Therapy, PT Therapeutic Activity, PT Manual Therapy, and PT Evaluation

## 2020-07-30 NOTE — DISCHARGE PLANNING
CASE MANAGEMENT INITIAL ASSESSMENT    Admit Date:  7/27/2020     I met with patient to discuss role of case management / discharge planning / team conference.   Patient is a  45 y.o. male transferred from [facility] 7.27.2020 s/p right TKA.  Patient known to me from previous rehab hospitalization.  Patient alert & cooperative w/ interview.    PMHx: severe RA, GERD, bipolar, ADD, obesity s/p gastric bypass, migraines, osteomyelitis, MONICA, PAF.    PLOF: mod independent w/ FWW, drives, lives w/ wife & children under 17 y/o, SSH w/ 2 [facility] in [place]. Previous HH w/ [facility]. DME from [facility]. Wife works FT for [employer] in [place]: APRN. Has multiple DME: FWW, SPC, wheelchair, platform walker, shower seat, hip kit.    PCP: Brit AGUDELO  Ortho: Dr. Temo Pires    Admitting MD: Dr. STEPHON Pisano    Diagnosis: 0008.9- orthopaedic disorders: other orthopaedic  Status post left knee replacement    Co-morbidities:   Patient Active Problem List    Diagnosis Date Noted   • Acute blood loss anemia 07/23/2020     Priority: High   • Atrial fibrillation with rapid ventricular response (HCC) 11/05/2019     Priority: High   • Paroxysmal atrial fibrillation (HCC) 07/23/2020     Priority: Low   • Pubic ramus fracture (HCC) 09/28/2019     Priority: Low   • Essential hypertension 09/13/2019     Priority: Low   • Rheumatoid arthritis (HCC) 09/08/2015     Priority: Low   • Chronic pain of right knee 09/08/2015     Priority: Low   • S/P total knee arthroplasty, right 07/27/2020   • Cigarette nicotine dependence without complication 07/21/2020   • Positive occult stool blood test 09/17/2019   • Aortic root dilatation (HCC) 09/13/2019   • Elevated alkaline phosphatase level 09/13/2019   • Aortic stenosis, moderate 09/12/2019   • Iron deficiency anemia, unspecified iron deficiency anemia type 08/15/2019   • Localized edema 08/15/2019   • Aortic ejection murmur 03/08/2019   • Obesity (BMI 35.0-39.9 without comorbidity)  ([facility]) 08/09/2018   • Pagophagia 11/13/2017   • Adult ADHD 09/08/2010   • MONICA (obstructive sleep apnea) 03/09/2010   • Mood disorder (HCC) 05/21/2009   • Anxiety disorder 05/21/2009   • Eczema 05/21/2009     Prior Living Situation:  Housing / Facility: [address]  Lives with - Patient's Self Care Capacity: Spouse, Child Less than 18 Years of Age    Prior Level of Function:  Medication Management: (Spouse assists)  Finances: (Spouse handles finances)  Home Management: Independent  Shopping: Independent  Prior Level Of Mobility: (Independent with intermittent AD use (cane or FWW))  Driving / Transportation: Driving Independent    Support Systems:  Primary : Katiana Hubbard wife [phone]  Other support systems: none provided  Advance Directives: No  Power of  (Name & Phone): none    Previous Services Utilized:   Equipment Owned: Front-Wheel Walker, Platform Walker, Single Point Cane, Wheelchair, Tub / Shower Seat  Prior Services: Home-Independent(previous [facility] )    Other Information:  Occupation (Pre-Hospital Vocational): Not Employed     Primary Payor Source: [employer]  Primary Care Practitioner : Brit AGUDELO  Other MDs: Dr. Temo Pires ortho    Patient / Family Goal:  Patient / Family Goal: Pain mgmt, For my knee to heal & get stronger    Additional Case Management Questions:  Have you ever received case management services for yourself or a family member? Yes    Do you feel you have and an understanding of what services  provide? Yes    Do you have any additional questions regarding case management?  No at this time        CASE MANAGEMENT PLAN OF CARE   Individualized Goals:   1. Pain mgmt  2. For my knee to heal & get stronger      Barriers:   1. Functional mobility deficits  2. Medical co-morbidities  [place]    Plan:  1. Continue to follow patient through hospitalization and provide discharge planning  in collaboration with patient, family, physicians and ancillary services.     2. Utilize community resources to ensure a safe discharge.

## 2020-07-30 NOTE — CARE PLAN
"  Problem: Mobility  Goal: STG-Within one week, patient will ascend and descend four to six stairs  Description: 1) Individualized goal:  Patient will amb up/down 2\" // bars 2x CGA.   2) Interventions:  PT Gait Training, PT Self Care/Home Eval, PT Therapeutic Exercises, PT Neuro Re-Ed/Balance, PT Aquatic Therapy, PT Therapeutic Activity, PT Manual Therapy, and PT Evaluation    Outcome: PROGRESSING AS EXPECTED     Problem: Mobility Transfers  Goal: STG-Within one week, patient will transfer bed to chair  Description: 1) Individualized goal:  Patient will transfer CGA SPT with FWW, SBA bed mob   2) Interventions:  PT Gait Training, PT Self Care/Home Eval, PT Therapeutic Exercises, PT Neuro Re-Ed/Balance, PT Aquatic Therapy, PT Therapeutic Activity, PT Manual Therapy, and PT Evaluation    Outcome: PROGRESSING AS EXPECTED     Problem: Mobility  Goal: STG-Within one week, patient will ambulate household distance  Description: 1) Individualized goal:  Patient will amb with FWW 20 ft x 2 indoors   2) Interventions:  PT Gait Training, PT Self Care/Home Eval, PT Therapeutic Exercises, PT Neuro Re-Ed/Balance, PT Aquatic Therapy, PT Therapeutic Activity, PT Manual Therapy, and PT Evaluation    Outcome: MET     "

## 2020-07-30 NOTE — PROGRESS NOTES
"Rehab Progress Note     Encounter Date: 7/30/2020    CC: right knee pain, sacral pain, chronic pain    Interval Events (Subjective)  Patient sitting up in room. Discussed with orthopedics and patient can use the immobilzer PRN and should be ranging as much as possible. Discussed with therapy that they should be more aggressive in encouraging patient as patient very hesitant. Discussed with patient and the swelling is causing a lot of pain. He reports that his pain is controlled and does not want to change the dosing. Denies NVD. Denies SOB. Discussed would have IDT later today. He is looking forward to more time for therapy.     IDT Team Meeting 7/30/2020    IKeaton M.D., was present and led the interdisciplinary team conference on 7/30/2020.  I led the IDT conference and agree with the IDT conference documentation and plan of care as noted below.     RN:  Diet Regular   % Meal     Pain Morphine   Sleep    Bowel Continent   Bladder Continent   In's & Out's      PT:  Bed Mobility supervs   Transfers    Mobility totA-SBA depending on pain   Stairs    Very limited ROM  Very resistant to active or passive ROM    OT: 1 of 4 goals  Eating    Grooming    Bathing    UB Dressing    LB Dressing Min-modA   Toileting modA   Shower & Transfer CGA   Doing well    CM:  Continues to work on disposition and DME needs.      DC/Disposition:  8/6/20    Objective:  VITAL SIGNS: /77   Pulse 64   Temp 37.2 °C (98.9 °F) (Tympanic)   Resp 18   Ht 1.803 m (5' 11\")   Wt 101.6 kg (224 lb)   SpO2 99%   BMI 31.24 kg/m²   Gen: NAD  Psych: Mood and affect appropriate  CV: RRR, RLE 2+ edema, 0 on LLE  Resp: CTAB, no upper airway sounds  Abd: NTND  Neuro: AOx4, following commands  Skin: Prevena dressing to right knee  Unchanged from 7/29/20    Recent Results (from the past 72 hour(s))   CBC with Differential    Collection Time: 07/28/20  6:18 AM   Result Value Ref Range    WBC 6.7 4.8 - 10.8 K/uL    RBC 2.94 (L) 4.70 " - 6.10 M/uL    Hemoglobin 8.6 (L) 14.0 - 18.0 g/dL    Hematocrit 27.3 (L) 42.0 - 52.0 %    MCV 92.9 81.4 - 97.8 fL    MCH 29.3 27.0 - 33.0 pg    MCHC 31.5 (L) 33.7 - 35.3 g/dL    RDW 45.5 35.9 - 50.0 fL    Platelet Count 255 164 - 446 K/uL    MPV 9.6 9.0 - 12.9 fL    Neutrophils-Polys 72.00 44.00 - 72.00 %    Lymphocytes 16.60 (L) 22.00 - 41.00 %    Monocytes 7.00 0.00 - 13.40 %    Eosinophils 2.70 0.00 - 6.90 %    Basophils 0.40 0.00 - 1.80 %    Immature Granulocytes 1.30 (H) 0.00 - 0.90 %    Nucleated RBC 0.00 /100 WBC    Neutrophils (Absolute) 4.81 1.82 - 7.42 K/uL    Lymphs (Absolute) 1.11 1.00 - 4.80 K/uL    Monos (Absolute) 0.47 0.00 - 0.85 K/uL    Eos (Absolute) 0.18 0.00 - 0.51 K/uL    Baso (Absolute) 0.03 0.00 - 0.12 K/uL    Immature Granulocytes (abs) 0.09 0.00 - 0.11 K/uL    NRBC (Absolute) 0.00 K/uL   Comp Metabolic Panel (CMP)    Collection Time: 07/28/20  6:18 AM   Result Value Ref Range    Sodium 137 135 - 145 mmol/L    Potassium 4.1 3.6 - 5.5 mmol/L    Chloride 104 96 - 112 mmol/L    Co2 24 20 - 33 mmol/L    Anion Gap 9.0 7.0 - 16.0    Glucose 92 65 - 99 mg/dL    Bun 11 8 - 22 mg/dL    Creatinine 0.46 (L) 0.50 - 1.40 mg/dL    Calcium 8.5 8.5 - 10.5 mg/dL    AST(SGOT) 12 12 - 45 U/L    ALT(SGPT) 10 2 - 50 U/L    Alkaline Phosphatase 152 (H) 30 - 99 U/L    Total Bilirubin 0.5 0.1 - 1.5 mg/dL    Albumin 2.6 (L) 3.2 - 4.9 g/dL    Total Protein 5.8 (L) 6.0 - 8.2 g/dL    Globulin 3.2 1.9 - 3.5 g/dL    A-G Ratio 0.8 g/dL   TSH with Reflex to FT4    Collection Time: 07/28/20  6:18 AM   Result Value Ref Range    TSH 1.310 0.380 - 5.330 uIU/mL   Vitamin D, 25-hydroxy (blood)    Collection Time: 07/28/20  6:18 AM   Result Value Ref Range    25-Hydroxy   Vitamin D 25 19 (L) 30 - 100 ng/mL   ESTIMATED GFR    Collection Time: 07/28/20  6:18 AM   Result Value Ref Range    GFR If African American >60 >60 mL/min/1.73 m 2    GFR If Non African American >60 >60 mL/min/1.73 m 2       Current Facility-Administered  Medications   Medication Frequency   • morphine ER (MS CONTIN) tablet 15 mg Q12HRS   • ferrous sulfate tablet 325 mg BID WITH MEALS   • ascorbic acid tablet 500 mg BID WITH MEALS   • vitamin D (cholecalciferol) tablet 2,000 Units DAILY   • Respiratory Therapy Consult Continuous RT   • tramadol (ULTRAM) 50 MG tablet 50 mg Q4HRS PRN   • hydrALAZINE (APRESOLINE) tablet 25 mg Q8HRS PRN   • acetaminophen (TYLENOL) tablet 650 mg Q4HRS PRN   • senna-docusate (PERICOLACE or SENOKOT S) 8.6-50 MG per tablet 2 Tab BID    And   • polyethylene glycol/lytes (MIRALAX) PACKET 1 Packet QDAY PRN    And   • magnesium hydroxide (MILK OF MAGNESIA) suspension 30 mL QDAY PRN    And   • bisacodyl (DULCOLAX) suppository 10 mg QDAY PRN   • artificial tears ophthalmic solution 1 Drop PRN   • benzocaine-menthol (CEPACOL) lozenge 1 Lozenge Q2HRS PRN   • mag hydrox-al hydrox-simeth (MAALOX PLUS ES or MYLANTA DS) suspension 20 mL Q2HRS PRN   • ondansetron (ZOFRAN ODT) dispertab 4 mg 4X/DAY PRN    Or   • ondansetron (ZOFRAN) syringe/vial injection 4 mg 4X/DAY PRN   • traZODone (DESYREL) tablet 50 mg QHS PRN   • sodium chloride (OCEAN) 0.65 % nasal spray 2 Spray PRN   • zolpidem (AMBIEN) tablet 5 mg HS PRN   • aspirin EC (ECOTRIN) tablet 81 mg DAILY   • losartan (COZAAR) tablet 25 mg DAILY   • morphine (MS IR) tablet 15 mg Q6HRS PRN   • morphine (MS IR) tablet 15 mg Q6HRS   • nicotine (NICODERM) 7 MG/24HR 7 mg Q24HRS   • omeprazole (PRILOSEC) capsule 20 mg DAILY   • pregabalin (LYRICA) capsule 150 mg TID       Orders Placed This Encounter   Procedures   • Diet Order Regular     Standing Status:   Standing     Number of Occurrences:   1     Order Specific Question:   Diet:     Answer:   Regular [1]       Assessment:  Active Hospital Problems    Diagnosis   • *S/P total knee arthroplasty, right   • Acute blood loss anemia   • Atrial fibrillation with rapid ventricular response (HCC)   • Paroxysmal atrial fibrillation (HCC)   • Pubic ramus fracture  ([facility])   • Essential hypertension   • Chronic pain of right knee   • Rheumatoid arthritis (HCC)   • Anxiety disorder       Medical Decision Making and Plan:  R Knee replacement - Patient s/p right knee replacement with Dr. Pires for severe valgus deformity and insufficient MCL on 7/22/20. Post-operative course complicated by severe anemia.  -PT and OT for mobility and ADLs  -R knee immobilizer PRN. WBAT.  Needs to do more ROM at knee      RA - Multiple joint involvement. Previously on Xeljanz      Anemia - Severe post-operative anemia. Check AM CBC 8.6. Fe panel at [facility] with low Iron  -Start Fe supplement BID and Vitamin C. Recheck on 7/31/20     HTN/ Afib -  Patient on Losartan 256 mg daily.       GERD - PPI on transfer.      Chronic pain - Patient on Tylenol 1000 mg QID, Morphine 15 mg q6h, Lyrica 150 mg TID  -Continue to have break through pain. MS Contin 15 mg BID to have baseline. Discussed about weaning off at 7-10 days post-operative     Tobacco abuse - Patient will need counseling. Currently on Nicotine patch.      Vitamin D deficiency - 19 on admission. Start 2000 U     DVT Ppx - Patient with severe anemia. Currently on ASA 81 mg    Total time:  36 minutes.  I spent greater than 50% of the time for patient care, counseling, and coordination on this date, including unit/floor time, and face-to-face time with the patient as per interval events and assessment and plan above. Topics discussed included orthopedic discussion about ROM, immobilizer PRN and needs to work on range, and discussed discharge planning. Patient was discussed separately in IDT today; please see details above.    Keaton Pisano M.D.

## 2020-07-30 NOTE — THERAPY
"Occupational Therapy  Daily Treatment     Patient Name: Richard Hubbard II  Age:  45 y.o., Sex:  male  Medical Record #: [id number]  Today's Date: 7/30/2020     Precautions  Precautions: Fall Risk, Weight Bearing As Tolerated Right Lower Extremity  Comments: No pelvic precautions, ok for gentle ROM and quad isometrics, knee immobilizer per patient preference    Safety   ADL Safety : Requires Physical Assist for Safety  Bathroom Safety: Requires Physical Assist for Safety    Subjective    \"I want to work on my right hand strength, I never got any therapy for it after my tendon surgery because of Covid\"     Objective       07/30/20 1101   Precautions   Precautions Fall Risk;Weight Bearing As Tolerated Right Lower Extremity   Comments No pelvic precautions, ok for gentle ROM and quad isometrics, knee immobilizer per patient preference   Cognition    Level of Consciousness Alert   Hand Strengthening   Hand Strengthening Right ;Right Pinch;Right Thumb Opposition;Gross Grasp Right   Comment Pt instructed in R  and pinch strengthening exercises seated at tabeltop, including: towel crunches for gross grasp/release, tendon gliding exercises for inc flexibility (straight hand, tabletop, hook fist, straight fist, full fist, and thumb opposition); light-medium resistance theraputty exercises for gross grasp , tip pinch, tripod pinch, and lateral pinch. Also provided pink and blue foam resistance blocks to complete grasp and pinch exercises in room.     Bed Mobility    Sit to Supine Stand by Assist   Scooting Stand by Assist   Rolling Supervised   Interdisciplinary Plan of Care Collaboration   IDT Collaboration with  Certified O.T. Assistant  ([email or web address]);Physical Therapist   Patient Position at End of Therapy In Bed;Call Light within Reach;Tray Table within Reach;Phone within Reach   Collaboration Comments CLOF, POC   OT Total Time Spent   OT Individual Total Time Spent (Mins) 30   OT Charge Group   OT Therapy Activity 2 "       Assessment    Pt tolerated OT session well with focus on R  and pinch strength. Pt reports that he was supposed to get hand therapy after tendon transfer surgery in January, but unable to get appointments because of Covid. He reports difficulty with grasping objects with R hand, and especially with moving 4th and 5th digits. Pt completed all exercises noted above with initial demonstration and receptive to education and recommendations provided.       Strengths: Able to follow instructions, Alert and oriented, Independent prior level of function, Effective communication skills, Pleasant and cooperative, Supportive family  Barriers: Fatigue, Decreased endurance, Generalized weakness, Impaired activity tolerance, Impaired balance, Pain    Plan    ADL/IADLs and related functional mobility, AE training, R /pinch strengthening, standing tolerance/balance.     Occupational Therapy Goals     Problem: Dressing     Dates: Start: 07/28/20       Goal: STG-Within one week, patient will dress LB     Dates: Start: 07/28/20       Description: 1) Individualized Goal:  Min assist with AE as needed  2) Interventions:  OT Self Care/ADL, OT Manual Ther Technique, OT Neuro Re-Ed/Balance, OT Sensory Int Techniques, OT Therapeutic Activity, OT Evaluation, and OT Therapeutic Exercise      Note:     Goal Note filed on 07/30/20 1102 by Wojciech Clements C.O.T.A.    Patient Min assist to don underwear and pants     Requires mod assist for right sock and shoe   Limited by decreased strength/ endurance  limited ROM  right knee                         Problem: Functional Transfers     Dates: Start: 07/28/20       Description: 1) Individualized Goal:  CGA    2) Interventions:  OT Self Care/ADL, OT Manual Ther Technique, OT Neuro Re-Ed/Balance, OT Sensory Int Techniques, OT Therapeutic Activity, OT Evaluation, and OT Therapeutic Exercise    Goal: STG-Within one week, patient will transfer to toilet     Dates: Start: 07/28/20        Description: 1) Individualized Goal:  CGA    2) Interventions:  OT Self Care/ADL, OT Manual Ther Technique, OT Neuro Re-Ed/Balance, OT Sensory Int Techniques, OT Therapeutic Activity, OT Evaluation, and OT Therapeutic Exercise    Note:     Goal Note filed on 07/30/20 1102 by Wojciech Clements, C.O.T.A.    Not yet observed                           Problem: IADL's     Dates: Start: 07/28/20       Goal: STG-Within one week, patient will access kitchen area     Dates: Start: 07/28/20       Description: 1) Individualized Goal:  Min assist with FWW    2) Interventions:  OT Self Care/ADL, OT Manual Ther Technique, OT Neuro Re-Ed/Balance, OT Sensory Int Techniques, OT Therapeutic Activity, OT Evaluation, and OT Therapeutic Exercise    Note:     Goal Note filed on 07/30/20 1102 by Wojciech Clements, C.O.T.A.    Not yet addressed                            Problem: OT Long Term Goals     Dates: Start: 07/28/20       Goal: LTG-By discharge, patient will complete basic self care tasks     Dates: Start: 07/28/20       Description: 1) Individualized Goal:  Mod I with AE as needed    2) Interventions:  OT Self Care/ADL, OT Manual Ther Technique, OT Neuro Re-Ed/Balance, OT Sensory Int Techniques, OT Therapeutic Activity, OT Evaluation, and OT Therapeutic Exercise          Goal: LTG-By discharge, patient will perform bathroom transfers     Dates: Start: 07/28/20       Description: 1) Individualized Goal:  Mod I   2) Interventions:  OT Self Care/ADL, OT Manual Ther Technique, OT Neuro Re-Ed/Balance, OT Sensory Int Techniques, OT Therapeutic Activity, OT Evaluation, and OT Therapeutic Exercise

## 2020-07-31 LAB
BASOPHILS # BLD AUTO: 0.6 % (ref 0–1.8)
BASOPHILS # BLD: 0.04 K/UL (ref 0–0.12)
EOSINOPHIL # BLD AUTO: 0.26 K/UL (ref 0–0.51)
EOSINOPHIL NFR BLD: 4.2 % (ref 0–6.9)
ERYTHROCYTE [DISTWIDTH] IN BLOOD BY AUTOMATED COUNT: 50.1 FL (ref 35.9–50)
HCT VFR BLD AUTO: 27 % (ref 42–52)
HGB BLD-MCNC: 8.4 G/DL (ref 14–18)
IMM GRANULOCYTES # BLD AUTO: 0.17 K/UL (ref 0–0.11)
IMM GRANULOCYTES NFR BLD AUTO: 2.7 % (ref 0–0.9)
LYMPHOCYTES # BLD AUTO: 1.54 K/UL (ref 1–4.8)
LYMPHOCYTES NFR BLD: 24.6 % (ref 22–41)
MCH RBC QN AUTO: 29.3 PG (ref 27–33)
MCHC RBC AUTO-ENTMCNC: 31.1 G/DL (ref 33.7–35.3)
MCV RBC AUTO: 94.1 FL (ref 81.4–97.8)
MONOCYTES # BLD AUTO: 0.58 K/UL (ref 0–0.85)
MONOCYTES NFR BLD AUTO: 9.3 % (ref 0–13.4)
NEUTROPHILS # BLD AUTO: 3.67 K/UL (ref 1.82–7.42)
NEUTROPHILS NFR BLD: 58.6 % (ref 44–72)
NRBC # BLD AUTO: 0 K/UL
NRBC BLD-RTO: 0 /100 WBC
PLATELET # BLD AUTO: 323 K/UL (ref 164–446)
PMV BLD AUTO: 9.1 FL (ref 9–12.9)
RBC # BLD AUTO: 2.87 M/UL (ref 4.7–6.1)
WBC # BLD AUTO: 6.3 K/UL (ref 4.8–10.8)

## 2020-07-31 PROCEDURE — 97110 THERAPEUTIC EXERCISES: CPT

## 2020-07-31 PROCEDURE — 97530 THERAPEUTIC ACTIVITIES: CPT

## 2020-07-31 PROCEDURE — 700101 HCHG RX REV CODE 250: Performed by: PHYSICAL MEDICINE & REHABILITATION

## 2020-07-31 PROCEDURE — A9270 NON-COVERED ITEM OR SERVICE: HCPCS | Performed by: PHYSICAL MEDICINE & REHABILITATION

## 2020-07-31 PROCEDURE — 85025 COMPLETE CBC W/AUTO DIFF WBC: CPT

## 2020-07-31 PROCEDURE — 770010 HCHG ROOM/CARE - REHAB SEMI PRIVAT*

## 2020-07-31 PROCEDURE — 97535 SELF CARE MNGMENT TRAINING: CPT

## 2020-07-31 PROCEDURE — 99232 SBSQ HOSP IP/OBS MODERATE 35: CPT | Performed by: PHYSICAL MEDICINE & REHABILITATION

## 2020-07-31 PROCEDURE — 700102 HCHG RX REV CODE 250 W/ 637 OVERRIDE(OP): Performed by: PHYSICAL MEDICINE & REHABILITATION

## 2020-07-31 PROCEDURE — 97116 GAIT TRAINING THERAPY: CPT

## 2020-07-31 PROCEDURE — 36415 COLL VENOUS BLD VENIPUNCTURE: CPT

## 2020-07-31 RX ORDER — BACITRACIN ZINC AND POLYMYXIN B SULFATE 500; 1000 [USP'U]/G; [USP'U]/G
OINTMENT TOPICAL PRN
Status: DISCONTINUED | OUTPATIENT
Start: 2020-07-31 | End: 2020-08-08 | Stop reason: HOSPADM

## 2020-07-31 RX ADMIN — MORPHINE SULFATE 15 MG: 15 TABLET, FILM COATED, EXTENDED RELEASE ORAL at 20:15

## 2020-07-31 RX ADMIN — FERROUS SULFATE TAB 325 MG (65 MG ELEMENTAL FE) 325 MG: 325 (65 FE) TAB at 17:02

## 2020-07-31 RX ADMIN — ZOLPIDEM TARTRATE 5 MG: 5 TABLET ORAL at 23:27

## 2020-07-31 RX ADMIN — Medication 500 MG: at 17:02

## 2020-07-31 RX ADMIN — MORPHINE SULFATE 15 MG: 15 TABLET ORAL at 08:18

## 2020-07-31 RX ADMIN — TRAMADOL HYDROCHLORIDE 50 MG: 50 TABLET, COATED ORAL at 11:06

## 2020-07-31 RX ADMIN — MORPHINE SULFATE 15 MG: 15 TABLET ORAL at 20:14

## 2020-07-31 RX ADMIN — FERROUS SULFATE TAB 325 MG (65 MG ELEMENTAL FE) 325 MG: 325 (65 FE) TAB at 08:18

## 2020-07-31 RX ADMIN — BACITRACIN ZINC AND POLYMYXIN B SULFATE: at 17:02

## 2020-07-31 RX ADMIN — MORPHINE SULFATE 15 MG: 15 TABLET ORAL at 22:36

## 2020-07-31 RX ADMIN — MORPHINE SULFATE 15 MG: 15 TABLET ORAL at 15:19

## 2020-07-31 RX ADMIN — DOCUSATE SODIUM 50 MG AND SENNOSIDES 8.6 MG 2 TABLET: 8.6; 5 TABLET, FILM COATED ORAL at 08:18

## 2020-07-31 RX ADMIN — NICOTINE 7 MG: 7 PATCH, EXTENDED RELEASE TRANSDERMAL at 17:02

## 2020-07-31 RX ADMIN — MORPHINE SULFATE 15 MG: 15 TABLET ORAL at 02:56

## 2020-07-31 RX ADMIN — PREGABALIN 150 MG: 150 CAPSULE ORAL at 08:18

## 2020-07-31 RX ADMIN — DOCUSATE SODIUM 50 MG AND SENNOSIDES 8.6 MG 2 TABLET: 8.6; 5 TABLET, FILM COATED ORAL at 20:13

## 2020-07-31 RX ADMIN — MAGNESIUM HYDROXIDE 30 ML: 400 SUSPENSION ORAL at 17:02

## 2020-07-31 RX ADMIN — PREGABALIN 150 MG: 150 CAPSULE ORAL at 15:19

## 2020-07-31 RX ADMIN — PREGABALIN 150 MG: 150 CAPSULE ORAL at 20:14

## 2020-07-31 RX ADMIN — MORPHINE SULFATE 15 MG: 15 TABLET ORAL at 13:30

## 2020-07-31 RX ADMIN — ASPIRIN 81 MG: 81 TABLET, COATED ORAL at 08:18

## 2020-07-31 RX ADMIN — MORPHINE SULFATE 15 MG: 15 TABLET ORAL at 07:20

## 2020-07-31 RX ADMIN — TRAMADOL HYDROCHLORIDE 50 MG: 50 TABLET, COATED ORAL at 15:22

## 2020-07-31 RX ADMIN — Medication 500 MG: at 08:18

## 2020-07-31 RX ADMIN — MORPHINE SULFATE 15 MG: 15 TABLET, FILM COATED, EXTENDED RELEASE ORAL at 08:17

## 2020-07-31 RX ADMIN — OMEPRAZOLE 20 MG: 20 CAPSULE, DELAYED RELEASE ORAL at 08:18

## 2020-07-31 RX ADMIN — ZOLPIDEM TARTRATE 5 MG: 5 TABLET ORAL at 00:27

## 2020-07-31 RX ADMIN — Medication 2000 UNITS: at 08:18

## 2020-07-31 ASSESSMENT — ACTIVITIES OF DAILY LIVING (ADL)
BED_CHAIR_WHEELCHAIR_TRANSFER_DESCRIPTION: ADAPTIVE EQUIPMENT;INCREASED TIME;INITIAL PREPARATION FOR TASK
TUB_SHOWER_TRANSFER_DESCRIPTION: GRAB BAR;SHOWER BENCH;INCREASED TIME;VERBAL CUEING
BED_CHAIR_WHEELCHAIR_TRANSFER_DESCRIPTION: ADAPTIVE EQUIPMENT;INCREASED TIME

## 2020-07-31 ASSESSMENT — GAIT ASSESSMENTS
ASSISTIVE DEVICE: FRONT WHEEL WALKER
DEVIATION: ANTALGIC;BRADYKINETIC
GAIT LEVEL OF ASSIST: STAND BY ASSIST

## 2020-07-31 NOTE — CARE PLAN
Pain management continues to be managed fairly well with prn and scheduled medications, ice, heat, and tens. Has good safety awareness and calls for help appropriately. Dressing remains c/d/I.   Problem: Safety  Goal: Will remain free from injury  Description: Pt uses call light consistently and appropriately. Waits for assistance does not attempt self transfer this shift. Able to verbalize needs.   Outcome: PROGRESSING AS EXPECTED  Goal: Will remain free from falls  Outcome: PROGRESSING AS EXPECTED     Problem: Communication  Goal: The ability to communicate needs accurately and effectively will improve  Description: Patient able to verbalize needs.  Will continue to monitor.   Outcome: MET

## 2020-07-31 NOTE — THERAPY
Occupational Therapy  Daily Treatment     Patient Name: Richard Hubbard II  Age:  45 y.o., Sex:  male  Medical Record #: [id number]  Today's Date: 7/31/2020     Precautions  Precautions: (P) Fall Risk, Weight Bearing As Tolerated Right Lower Extremity  Comments: (P) No pelvic precautions, ok for stretching and ROM and quad isometrics, knee immobilizer per patient preference     Safety   ADL Safety : Requires Supervision for Safety  Bathroom Safety: Requires Supervision for Safety  Comments: see below notes for ADL performance details.    Subjective    Pt received supine in bed, agreeable to OT session, requested assist with shoelace setup     Objective       07/31/20 1431   Precautions   Precautions Fall Risk;Weight Bearing As Tolerated Right Lower Extremity   Comments No pelvic precautions, ok for stretching and ROM and quad isometrics, knee immobilizer per patient preference    OT Total Time Spent   OT Individual Total Time Spent (Mins) 30   OT Charge Group   OT Therapy Activity 2     Pt's shoelaces switched out for elastic option, pt able to don L shoe without assist, R shoe with min A and verbal cues, use of dressing stick, tie adjusted to enable optimal support but provide enough space for ease of donning/doffing.     Pt completed sit to stands from therapy mat 1x15, focus on RLE positioning and hand positioning to avoid pulling on FWW, completed with initial cues and SBA.     Assessment    Pt tolerated session well, motivated for independence, reports continued pain in R knee but pleased with ability to position it this session for sit to stands.   Strengths: Able to follow instructions, Alert and oriented, Independent prior level of function, Effective communication skills, Pleasant and cooperative, Supportive family  Barriers: Fatigue, Decreased endurance, Generalized weakness, Impaired activity tolerance, Impaired balance, Pain    Plan    ADL/IADLs and related functional mobility, AE training, R  /pinch strengthening, standing tolerance/balance.     Occupational Therapy Goals     Problem: Dressing     Dates: Start: 07/28/20       Goal: STG-Within one week, patient will dress LB     Dates: Start: 07/28/20       Description: 1) Individualized Goal:  Min assist with AE as needed  2) Interventions:  OT Self Care/ADL, OT Manual Ther Technique, OT Neuro Re-Ed/Balance, OT Sensory Int Techniques, OT Therapeutic Activity, OT Evaluation, and OT Therapeutic Exercise      Note:     Goal Note filed on 07/30/20 1102 by Wojciech Clements, C.O.T.A.    Patient Min assist to don underwear and pants     Requires mod assist for right sock and shoe   Limited by decreased strength/ endurance  limited ROM  right knee                         Problem: Functional Transfers     Dates: Start: 07/28/20       Description: 1) Individualized Goal:  CGA    2) Interventions:  OT Self Care/ADL, OT Manual Ther Technique, OT Neuro Re-Ed/Balance, OT Sensory Int Techniques, OT Therapeutic Activity, OT Evaluation, and OT Therapeutic Exercise    Goal: STG-Within one week, patient will transfer to toilet     Dates: Start: 07/28/20       Description: 1) Individualized Goal:  CGA    2) Interventions:  OT Self Care/ADL, OT Manual Ther Technique, OT Neuro Re-Ed/Balance, OT Sensory Int Techniques, OT Therapeutic Activity, OT Evaluation, and OT Therapeutic Exercise    Note:     Goal Note filed on 07/30/20 1102 by Wojciech Clements, C.O.T.A.    Not yet observed                           Problem: IADL's     Dates: Start: 07/28/20       Goal: STG-Within one week, patient will access kitchen area     Dates: Start: 07/28/20       Description: 1) Individualized Goal:  Min assist with FWW    2) Interventions:  OT Self Care/ADL, OT Manual Ther Technique, OT Neuro Re-Ed/Balance, OT Sensory Int Techniques, OT Therapeutic Activity, OT Evaluation, and OT Therapeutic Exercise    Note:     Goal Note filed on 07/30/20 1102 by Wojciech Clements, C.O.T.A.    Not yet addressed                             Problem: OT Long Term Goals     Dates: Start: 07/28/20       Goal: LTG-By discharge, patient will complete basic self care tasks     Dates: Start: 07/28/20       Description: 1) Individualized Goal:  Mod I with AE as needed    2) Interventions:  OT Self Care/ADL, OT Manual Ther Technique, OT Neuro Re-Ed/Balance, OT Sensory Int Techniques, OT Therapeutic Activity, OT Evaluation, and OT Therapeutic Exercise          Goal: LTG-By discharge, patient will perform bathroom transfers     Dates: Start: 07/28/20       Description: 1) Individualized Goal:  Mod I   2) Interventions:  OT Self Care/ADL, OT Manual Ther Technique, OT Neuro Re-Ed/Balance, OT Sensory Int Techniques, OT Therapeutic Activity, OT Evaluation, and OT Therapeutic Exercise

## 2020-07-31 NOTE — CARE PLAN
Problem: Communication  Goal: The ability to communicate needs accurately and effectively will improve  Description: Patient able to verbalize needs.  Will continue to monitor.   Outcome: PROGRESSING AS EXPECTED     Problem: Safety  Goal: Will remain free from injury  Description: Pt uses call light consistently and appropriately. Waits for assistance does not attempt self transfer this shift. Able to verbalize needs.   Outcome: PROGRESSING AS EXPECTED     Problem: Pain Management  Goal: Pain level will decrease to patient's comfort goal  Outcome: PROGRESSING AS EXPECTED

## 2020-07-31 NOTE — PROGRESS NOTES
"Rehab Progress Note     Encounter Date: 7/31/2020    CC: right knee pain, sacral pain, chronic pain    Interval Events (Subjective)  Patient sitting up in room. He reports ongoing swelling. There is a slight increase in swelling in the lateral aspect but the medial aspect actually looks less swollen. Discussed again about need for ROM. He reports neuropathic pain shooting down his lateral leg to his shin. He is on Lyrica and thinks his current dose is sufficient. Reviewed AM labs including stable hemoglobin.    IDT Team Meeting 7/30/2020  DC/Disposition:  8/6/20    Objective:  VITAL SIGNS: /65   Pulse 83   Temp 36.1 °C (96.9 °F) (Temporal)   Resp 18   Ht 1.803 m (5' 11\")   Wt 101.6 kg (224 lb)   SpO2 98%   BMI 31.24 kg/m²   Gen: NAD  Psych: Mood and affect appropriate  CV: RRR, no edema  Resp: CTAB, no upper airway sounds  Abd: NTND  MSK: Right knee lateral aspect swollen > medial, 15-20 degrees of flexion    Recent Results (from the past 72 hour(s))   CBC WITH DIFFERENTIAL    Collection Time: 07/31/20  5:53 AM   Result Value Ref Range    WBC 6.3 4.8 - 10.8 K/uL    RBC 2.87 (L) 4.70 - 6.10 M/uL    Hemoglobin 8.4 (L) 14.0 - 18.0 g/dL    Hematocrit 27.0 (L) 42.0 - 52.0 %    MCV 94.1 81.4 - 97.8 fL    MCH 29.3 27.0 - 33.0 pg    MCHC 31.1 (L) 33.7 - 35.3 g/dL    RDW 50.1 (H) 35.9 - 50.0 fL    Platelet Count 323 164 - 446 K/uL    MPV 9.1 9.0 - 12.9 fL    Neutrophils-Polys 58.60 44.00 - 72.00 %    Lymphocytes 24.60 22.00 - 41.00 %    Monocytes 9.30 0.00 - 13.40 %    Eosinophils 4.20 0.00 - 6.90 %    Basophils 0.60 0.00 - 1.80 %    Immature Granulocytes 2.70 (H) 0.00 - 0.90 %    Nucleated RBC 0.00 /100 WBC    Neutrophils (Absolute) 3.67 1.82 - 7.42 K/uL    Lymphs (Absolute) 1.54 1.00 - 4.80 K/uL    Monos (Absolute) 0.58 0.00 - 0.85 K/uL    Eos (Absolute) 0.26 0.00 - 0.51 K/uL    Baso (Absolute) 0.04 0.00 - 0.12 K/uL    Immature Granulocytes (abs) 0.17 (H) 0.00 - 0.11 K/uL    NRBC (Absolute) 0.00 K/uL "       Current Facility-Administered Medications   Medication Frequency   • bacitracin-polymyxin b (POLYSPORIN) 500-70915 UNIT/GM ointment PRN   • carboxymethylcellulose 1 % ophth soln 1 Drop PRN   • morphine ER (MS CONTIN) tablet 15 mg Q12HRS   • ferrous sulfate tablet 325 mg BID WITH MEALS   • ascorbic acid tablet 500 mg BID WITH MEALS   • vitamin D (cholecalciferol) tablet 2,000 Units DAILY   • Respiratory Therapy Consult Continuous RT   • tramadol (ULTRAM) 50 MG tablet 50 mg Q4HRS PRN   • hydrALAZINE (APRESOLINE) tablet 25 mg Q8HRS PRN   • acetaminophen (TYLENOL) tablet 650 mg Q4HRS PRN   • senna-docusate (PERICOLACE or SENOKOT S) 8.6-50 MG per tablet 2 Tab BID    And   • polyethylene glycol/lytes (MIRALAX) PACKET 1 Packet QDAY PRN    And   • magnesium hydroxide (MILK OF MAGNESIA) suspension 30 mL QDAY PRN    And   • bisacodyl (DULCOLAX) suppository 10 mg QDAY PRN   • benzocaine-menthol (CEPACOL) lozenge 1 Lozenge Q2HRS PRN   • mag hydrox-al hydrox-simeth (MAALOX PLUS ES or MYLANTA DS) suspension 20 mL Q2HRS PRN   • ondansetron (ZOFRAN ODT) dispertab 4 mg 4X/DAY PRN    Or   • ondansetron (ZOFRAN) syringe/vial injection 4 mg 4X/DAY PRN   • traZODone (DESYREL) tablet 50 mg QHS PRN   • sodium chloride (OCEAN) 0.65 % nasal spray 2 Spray PRN   • zolpidem (AMBIEN) tablet 5 mg HS PRN   • aspirin EC (ECOTRIN) tablet 81 mg DAILY   • losartan (COZAAR) tablet 25 mg DAILY   • morphine (MS IR) tablet 15 mg Q6HRS PRN   • morphine (MS IR) tablet 15 mg Q6HRS   • nicotine (NICODERM) 7 MG/24HR 7 mg Q24HRS   • omeprazole (PRILOSEC) capsule 20 mg DAILY   • pregabalin (LYRICA) capsule 150 mg TID       Orders Placed This Encounter   Procedures   • Diet Order Regular     Standing Status:   Standing     Number of Occurrences:   1     Order Specific Question:   Diet:     Answer:   Regular [1]       Assessment:  Active Hospital Problems    Diagnosis   • *S/P total knee arthroplasty, right   • Acute blood loss anemia   • Atrial  fibrillation with rapid ventricular response (HCC)   • Paroxysmal atrial fibrillation (HCC)   • Pubic ramus fracture (HCC)   • Essential hypertension   • Chronic pain of right knee   • Rheumatoid arthritis (HCC)   • Anxiety disorder       Medical Decision Making and Plan:  R Knee replacement - Patient s/p right knee replacement with Dr. Pires for severe valgus deformity and insufficient MCL on 7/22/20. Post-operative course complicated by severe anemia.  -PT and OT for mobility and ADLs  -R knee immobilizer PRN. WBAT.  Needs to do more ROM at knee      RA - Multiple joint involvement. Previously on Xeljanz      Anemia - Severe post-operative anemia. Check AM CBC 8.6. Fe panel at [facility] with low Iron  -Start Fe supplement BID and Vitamin C. Recheck on 7/31/20 - stable 8.4     HTN/ Afib -  Patient on Losartan 25 mg daily.  Elevated > 140 x1.      GERD - PPI on transfer.      Chronic pain - Patient on Tylenol 1000 mg QID, Morphine 15 mg q6h, Lyrica 150 mg TID  -Continue to have break through pain. MS Contin 15 mg BID to have baseline. Discussed about weaning off at 7-10 days post-operative  -Worsening neuropathic pain, would like to stay at Lyrica 150 mg TID     Tobacco abuse - Patient will need counseling. Currently on Nicotine patch.      Vitamin D deficiency - 19 on admission. Start 2000 U     DVT Ppx - Patient with severe anemia. Currently on ASA 81 mg    Total time:  26 minutes.  I spent greater than 50% of the time for patient care, counseling, and coordination on this date, including unit/floor time, and face-to-face time with the patient as per interval events and assessment and plan above. Topics discussed included elevated SBP, increased neuropathic pain, increase ROM and monitor SBP over weekend.     Keaton Pisano M.D.

## 2020-07-31 NOTE — THERAPY
Occupational Therapy  Daily Treatment     Patient Name: Richard Hubbard II  Age:  45 y.o., Sex:  male  Medical Record #: [id number]  Today's Date: 7/31/2020     Precautions  Precautions: (P) Fall Risk, Weight Bearing As Tolerated Right Lower Extremity  Comments: (P) No pelvic precautions, ok for gentle ROM and quad isometrics, knee immobilizer per patient preference     Safety   ADL Safety : Requires Supervision for Safety  Bathroom Safety: Requires Supervision for Safety  Comments: see below notes for ADL performance details.    Subjective    Pt received in gym, requested to shower     Objective       07/31/20 0931   Precautions   Precautions Fall Risk;Weight Bearing As Tolerated Right Lower Extremity   Comments No pelvic precautions, ok for gentle ROM and quad isometrics, knee immobilizer per patient preference    Functional Level of Assist   Grooming Modified Independent   Grooming Description Seated in wheelchair at sink   Bathing Minimal Assist   Bathing Description Increased time;Grab bar;Hand held shower;Tub bench  (min A in standing to rinse backside)   Upper Body Dressing Supervision   Upper Body Dressing Description Set-up of equipment   Lower Body Dressing Minimal Assist   Lower Body Dressing Description Shoe horn;Reacher;Increased time  (dressing stick for shorts/underwr, assist for R shoe)   Tub / Shower Transfers Contact Guard Assist   Tub Shower Transfer Description Grab bar;Shower bench;Increased time;Verbal cueing  (stand pivot using GB)   OT Total Time Spent   OT Individual Total Time Spent (Mins) 60   OT Charge Group   OT Self Care / ADL 4       Assessment    Pt tolerated session well, demos good initiative to complete activities on his own to best of his ability, limited by knee pain and poor tolerance for R knee ROM and repositioning, required assist primarily for R shoe and for logistical management during showering. Per pt he is unable to install grab bars in his shower, was managing  "showering himself prior to this recent surgery but was \"not the safest situation\" and required assist getting in/out, will benefit from follow up education on bathroom equipment options for increased safety.   Strengths: Able to follow instructions, Alert and oriented, Independent prior level of function, Effective communication skills, Pleasant and cooperative, Supportive family  Barriers: Fatigue, Decreased endurance, Generalized weakness, Impaired activity tolerance, Impaired balance, Pain    Plan    ADL/IADLs and related functional mobility, AE training, R /pinch strengthening, standing tolerance/balance.     Occupational Therapy Goals     Problem: Dressing     Dates: Start: 07/28/20       Goal: STG-Within one week, patient will dress LB     Dates: Start: 07/28/20       Description: 1) Individualized Goal:  Min assist with AE as needed  2) Interventions:  OT Self Care/ADL, OT Manual Ther Technique, OT Neuro Re-Ed/Balance, OT Sensory Int Techniques, OT Therapeutic Activity, OT Evaluation, and OT Therapeutic Exercise      Note:     Goal Note filed on 07/30/20 1102 by Wojciech Clements, C.O.T.A.    Patient Min assist to don underwear and pants     Requires mod assist for right sock and shoe   Limited by decreased strength/ endurance  limited ROM  right knee                         Problem: Functional Transfers     Dates: Start: 07/28/20       Description: 1) Individualized Goal:  CGA    2) Interventions:  OT Self Care/ADL, OT Manual Ther Technique, OT Neuro Re-Ed/Balance, OT Sensory Int Techniques, OT Therapeutic Activity, OT Evaluation, and OT Therapeutic Exercise    Goal: STG-Within one week, patient will transfer to toilet     Dates: Start: 07/28/20       Description: 1) Individualized Goal:  CGA    2) Interventions:  OT Self Care/ADL, OT Manual Ther Technique, OT Neuro Re-Ed/Balance, OT Sensory Int Techniques, OT Therapeutic Activity, OT Evaluation, and OT Therapeutic Exercise    Note:     Goal Note filed on " 07/30/20 1102 by Wojciech Clements, C.O.T.A.    Not yet observed                           Problem: IADL's     Dates: Start: 07/28/20       Goal: STG-Within one week, patient will access kitchen area     Dates: Start: 07/28/20       Description: 1) Individualized Goal:  Min assist with FWW    2) Interventions:  OT Self Care/ADL, OT Manual Ther Technique, OT Neuro Re-Ed/Balance, OT Sensory Int Techniques, OT Therapeutic Activity, OT Evaluation, and OT Therapeutic Exercise    Note:     Goal Note filed on 07/30/20 1102 by Wojciech Clements, MARISELA.O.T.A.    Not yet addressed                            Problem: OT Long Term Goals     Dates: Start: 07/28/20       Goal: LTG-By discharge, patient will complete basic self care tasks     Dates: Start: 07/28/20       Description: 1) Individualized Goal:  Mod I with AE as needed    2) Interventions:  OT Self Care/ADL, OT Manual Ther Technique, OT Neuro Re-Ed/Balance, OT Sensory Int Techniques, OT Therapeutic Activity, OT Evaluation, and OT Therapeutic Exercise          Goal: LTG-By discharge, patient will perform bathroom transfers     Dates: Start: 07/28/20       Description: 1) Individualized Goal:  Mod I   2) Interventions:  OT Self Care/ADL, OT Manual Ther Technique, OT Neuro Re-Ed/Balance, OT Sensory Int Techniques, OT Therapeutic Activity, OT Evaluation, and OT Therapeutic Exercise

## 2020-07-31 NOTE — THERAPY
"Physical Therapy   Daily Treatment     Patient Name: Richard Hubbard II  Age:  45 y.o., Sex:  male  Medical Record #: [id number]  Today's Date: 7/31/2020     Precautions  Precautions: Fall Risk, Weight Bearing As Tolerated Right Lower Extremity  Comments: No pelvic precautions, ok for stretching and ROM and quad isometrics, knee immobilizer per patient preference     Subjective    Patient in bed and agreeable to therapy; initially resistant to performing painful activities however with education agreeable to \"whatever you think is best\".     Objective       07/31/20 1301   Precautions   Precautions Fall Risk;Weight Bearing As Tolerated Right Lower Extremity   Comments No pelvic precautions, ok for stretching and ROM, knee immobilizer per patient preference   Gait Functional Level of Assist    Gait Level Of Assist Stand by Assist   Assistive Device Front Wheel Walker   Distance (Feet)   (110 ftx1, 50 ftx1)   Deviation Antalgic;Bradykinetic  (heavy UE use, decreased foot clearance)   Transfer Functional Level of Assist   Bed, Chair, Wheelchair Transfer Stand by Assist   Bed Chair Wheelchair Transfer Description Adaptive equipment;Increased time  (stand step transfer with FWW)   Supine Lower Body Exercise   Other Exercises supine RLE knee flexion/extension supported by PT 2x4 minutes with gentle overpressure. Prone RLE knee flexion stretch using contract-relax technique 2x90 seconds with knee on ice pack for pain management. Use of TENS unit (program 3, 68-72 mA on hamstrings, 58-64 mA on quads) for 20 minutes for pain management   Bed Mobility    Supine to Sit Stand by Assist   Sit to Supine Stand by Assist   Sit to Stand Stand by Assist   Scooting Stand by Assist   Rolling Supervised   Interdisciplinary Plan of Care Collaboration   Patient Position at End of Therapy In Bed;Call Light within Reach;Tray Table within Reach;Phone within Reach   PT Total Time Spent   PT Individual Total Time Spent (Mins) 60   PT Charge " "Group   PT Gait Training 1   PT Therapeutic Exercise 2   PT Therapeutic Activities 1       Assessment    Patient tolerated session well, improved buy-in regarding importance of addressing knee ROM. Patient demonstrating improved RLE knee flexion/foot clearance during swing post-stretching.    Strengths: Supportive family, Motivated for self care and independence  Barriers: Limited mobility, Impaired balance, Impaired activity tolerance, Pain, Pain poorly managed, Decreased endurance, Fatigue    Plan    Gait training with FWW, transfer training, BLE strengthening, gentle ROM to patient tolerance, standing and activity tolerance, stair/curb negotiation as tolerated, standing balance.    Physical Therapy Problems     Problem: Mobility     Dates: Start: 07/28/20       Goal: STG-Within one week, patient will ascend and descend four to six stairs     Dates: Start: 07/28/20       Description: 1) Individualized goal:  Patient will amb up/down 2\" // bars 2x CGA.   2) Interventions:  PT Gait Training, PT Self Care/Home Eval, PT Therapeutic Exercises, PT Neuro Re-Ed/Balance, PT Aquatic Therapy, PT Therapeutic Activity, PT Manual Therapy, and PT Evaluation                  Problem: Mobility Transfers     Dates: Start: 07/28/20       Goal: STG-Within one week, patient will transfer bed to chair     Dates: Start: 07/28/20       Description: 1) Individualized goal:  Patient will transfer CGA SPT with FWW, SBA bed mob   2) Interventions:  PT Gait Training, PT Self Care/Home Eval, PT Therapeutic Exercises, PT Neuro Re-Ed/Balance, PT Aquatic Therapy, PT Therapeutic Activity, PT Manual Therapy, and PT Evaluation                  Problem: PT-Long Term Goals     Dates: Start: 07/28/20       Goal: LTG-By discharge, patient will ambulate     Dates: Start: 07/28/20       Description: 1) Individualized goal:  Patient will amb >50 ft indoors mod I with FWW  2) Interventions:  PT Gait Training, PT Self Care/Home Eval, PT Therapeutic " Exercises, PT Neuro Re-Ed/Balance, PT Aquatic Therapy, PT Therapeutic Activity, PT Manual Therapy, and PT Evaluation            Goal: LTG-By discharge, patient will transfer one surface to another     Dates: Start: 07/28/20       Description: 1) Individualized goal: Patient will transfer Mod I with FWW STS/SPT   2) Interventions:  PT Gait Training, PT Self Care/Home Eval, PT Therapeutic Exercises, PT Neuro Re-Ed/Balance, PT Aquatic Therapy, PT Therapeutic Activity, PT Manual Therapy, and PT Evaluation            Goal: LTG-By discharge, patient will ambulate up/down 4-6 stairs     Dates: Start: 07/28/20       Description: 1) Individualized goal:  Patient will amb up/down 2 standard stairs with no railing CGA  2) Interventions:  PT Gait Training, PT Self Care/Home Eval, PT Therapeutic Exercises, PT Neuro Re-Ed/Balance, PT Aquatic Therapy, PT Therapeutic Activity, PT Manual Therapy, and PT Evaluation

## 2020-07-31 NOTE — THERAPY
"Physical Therapy   Daily Treatment     Patient Name: Richard Hubbard II  Age:  45 y.o., Sex:  male  Medical Record #: [id number]  Today's Date: 7/31/2020     Precautions  Precautions: Fall Risk, Weight Bearing As Tolerated Right Lower Extremity  Comments: (P) No pelvic precautions, ok for stretching and ROM and quad isometrics, knee immobilizer per patient preference     Subjective    Patient in w/c and agreeable to therapy, reporting intense shooting leg pain.    \"I'm not going to tell you what to do because I know you know what's best, but I'm not afraid to say no.\"    \"It's funny that you're taking what the doctor and my surgeon seriously when they meant it as a joke.\" (in reference to ranging and stretching R knee)     Objective       07/31/20 0901   Precautions   Precautions Fall Risk;Weight Bearing As Tolerated Right Lower Extremity   Comments No pelvic precautions, ok for stretching and ROM and quad isometrics, knee immobilizer per patient preference    Pain 0 - 10 Group   Location Knee   Location Orientation Right   Pain Rating Scale (NPRS) 8   Description Shooting   Wheelchair Functional Level of Assist   Wheelchair Assist Supervised   Distance Wheelchair (Feet or Distance) 50   Transfer Functional Level of Assist   Bed, Chair, Wheelchair Transfer Contact Guard Assist  (to SBA)   Bed Chair Wheelchair Transfer Description Adaptive equipment;Increased time;Initial preparation for task  (stand step transfer with FWW)   Sitting Lower Body Exercises   Nustep Resistance Level 1  (x15 minutes with BUEs/BLEs for RLE knee ROM partial range)   Bed Mobility    Sit to Stand Stand by Assist   Interdisciplinary Plan of Care Collaboration   IDT Collaboration with  Occupational Therapist   Patient Position at End of Therapy Seated  (with OT in gym)   Collaboration Comments patient care handoff to OT   PT Total Time Spent   PT Individual Total Time Spent (Mins) 30   PT Charge Group   PT Therapeutic Exercise 1   PT " "Therapeutic Activities 1     Extensive education regarding importance of R knee ROM and mobilization, as well as encouraging ice for pain management as opposed to heat.    Assessment    Patient tolerated session fairly, continues to require education regarding benefit of stretching knee despite discomfort. Patient continues to believe that his surgeon only indicated gentle ROM to his tolerance and does not think his knee should be stretched. Will benefit from interdisciplinary reinforcement of education.  Strengths: Supportive family, Motivated for self care and independence  Barriers: Limited mobility, Impaired balance, Impaired activity tolerance, Pain, Pain poorly managed, Decreased endurance, Fatigue    Plan    Gait training with FWW, transfer training, BLE strengthening, gentle ROM to patient tolerance, standing and activity tolerance, stair/curb negotiation as tolerated, standing balance.    Physical Therapy Problems     Problem: Mobility     Dates: Start: 07/28/20       Goal: STG-Within one week, patient will ascend and descend four to six stairs     Dates: Start: 07/28/20       Description: 1) Individualized goal:  Patient will amb up/down 2\" // bars 2x CGA.   2) Interventions:  PT Gait Training, PT Self Care/Home Eval, PT Therapeutic Exercises, PT Neuro Re-Ed/Balance, PT Aquatic Therapy, PT Therapeutic Activity, PT Manual Therapy, and PT Evaluation                  Problem: Mobility Transfers     Dates: Start: 07/28/20       Goal: STG-Within one week, patient will transfer bed to chair     Dates: Start: 07/28/20       Description: 1) Individualized goal:  Patient will transfer CGA SPT with FWW, SBA bed mob   2) Interventions:  PT Gait Training, PT Self Care/Home Eval, PT Therapeutic Exercises, PT Neuro Re-Ed/Balance, PT Aquatic Therapy, PT Therapeutic Activity, PT Manual Therapy, and PT Evaluation                  Problem: PT-Long Term Goals     Dates: Start: 07/28/20       Goal: LTG-By discharge, patient " will ambulate     Dates: Start: 07/28/20       Description: 1) Individualized goal:  Patient will amb >50 ft indoors mod I with FWW  2) Interventions:  PT Gait Training, PT Self Care/Home Eval, PT Therapeutic Exercises, PT Neuro Re-Ed/Balance, PT Aquatic Therapy, PT Therapeutic Activity, PT Manual Therapy, and PT Evaluation            Goal: LTG-By discharge, patient will transfer one surface to another     Dates: Start: 07/28/20       Description: 1) Individualized goal: Patient will transfer Mod I with FWW STS/SPT   2) Interventions:  PT Gait Training, PT Self Care/Home Eval, PT Therapeutic Exercises, PT Neuro Re-Ed/Balance, PT Aquatic Therapy, PT Therapeutic Activity, PT Manual Therapy, and PT Evaluation            Goal: LTG-By discharge, patient will ambulate up/down 4-6 stairs     Dates: Start: 07/28/20       Description: 1) Individualized goal:  Patient will amb up/down 2 standard stairs with no railing CGA  2) Interventions:  PT Gait Training, PT Self Care/Home Eval, PT Therapeutic Exercises, PT Neuro Re-Ed/Balance, PT Aquatic Therapy, PT Therapeutic Activity, PT Manual Therapy, and PT Evaluation

## 2020-07-31 NOTE — THERAPY
"Recreational Therapy  Daily Treatment     Patient Name: Richard Hubbard II  AGE:  45 y.o., SEX:  male  Medical Record #: [id number]  Today's Date: 7/31/2020       Subjective    \"This is the one time of the day I forget my pain.\"     Objective       07/31/20 1031   Functional Ability Status - Cognitive   Attention Span Remains on Task   Comprehension Follows Three Step Commands   Judgment Able to Make Independent Decisions   Functional Ability Status - Emotional    Affect Appropriate   Mood Appropriate   Behavior Appropriate   Skilled Intervention    Skilled Intervention Relaxation / Coping Skills   Skilled Intervention Comments positive coping skills   Interdisciplinary Plan of Care Collaboration   Patient Position at End of Therapy Seated;Tray Table within Reach;Call Light within Reach   Strengths & Barriers   Strengths Able to follow instructions;Alert and oriented;Effective communication skills;Good carryover of learning;Good insight into deficits/needs;Making steady progress towards goals;Manages pain appropriately;Motivated for self care and independence;Pleasant and cooperative;Supportive family;Willingly participates in therapeutic activities   Barriers Fatigue;Generalized weakness;Impaired activity tolerance;Pain   Treatment Time   Total Time Spent (mins) 30   Procedural Tracking   Procedural Tracking Leisure Skills Development       Assessment    Pt was taken to the family lounge to play Wii [employer] and golf. Pt reporting that this is a positive coping skills for him and his pain.     Strengths: (P) Able to follow instructions, Alert and oriented, Effective communication skills, Good carryover of learning, Good insight into deficits/needs, Making steady progress towards goals, Manages pain appropriately, Motivated for self care and independence, Pleasant and cooperative, Supportive family, Willingly participates in therapeutic activities  Barriers: (P) Fatigue, Generalized weakness, Impaired activity " tolerance, Pain    Plan    Continue with distractions techniques.

## 2020-07-31 NOTE — PROGRESS NOTES
Battery  on wound vac so per surgeon instructions dressing was removed, area cleansed, picture taken, and dry island dressing applied

## 2020-07-31 NOTE — CARE PLAN
Pain continues to be a limiting factor in patients progress, keeping up on scheduled and prn meds which is helping but patient still reports very high pain levels. Discussed with MD in conference, therapists reported same issue.      Problem: Communication  Goal: The ability to communicate needs accurately and effectively will improve  Description: Patient able to verbalize needs.  Will continue to monitor.   Outcome: PROGRESSING AS EXPECTED     Problem: Safety  Goal: Will remain free from injury  Description: Pt uses call light consistently and appropriately. Waits for assistance does not attempt self transfer this shift. Able to verbalize needs.   Outcome: PROGRESSING AS EXPECTED  Goal: Will remain free from falls  Outcome: PROGRESSING AS EXPECTED     Problem: Pain Management  Goal: Pain level will decrease to patient's comfort goal  Outcome: PROGRESSING SLOWER THAN EXPECTED

## 2020-08-01 PROCEDURE — 97110 THERAPEUTIC EXERCISES: CPT

## 2020-08-01 PROCEDURE — 97116 GAIT TRAINING THERAPY: CPT

## 2020-08-01 PROCEDURE — 770010 HCHG ROOM/CARE - REHAB SEMI PRIVAT*

## 2020-08-01 PROCEDURE — 700102 HCHG RX REV CODE 250 W/ 637 OVERRIDE(OP): Performed by: PHYSICAL MEDICINE & REHABILITATION

## 2020-08-01 PROCEDURE — 97110 THERAPEUTIC EXERCISES: CPT | Mod: CO

## 2020-08-01 PROCEDURE — 97140 MANUAL THERAPY 1/> REGIONS: CPT | Mod: CO

## 2020-08-01 PROCEDURE — A9270 NON-COVERED ITEM OR SERVICE: HCPCS | Performed by: PHYSICAL MEDICINE & REHABILITATION

## 2020-08-01 PROCEDURE — 97530 THERAPEUTIC ACTIVITIES: CPT

## 2020-08-01 RX ADMIN — TRAMADOL HYDROCHLORIDE 50 MG: 50 TABLET, COATED ORAL at 08:12

## 2020-08-01 RX ADMIN — DOCUSATE SODIUM 50 MG AND SENNOSIDES 8.6 MG 2 TABLET: 8.6; 5 TABLET, FILM COATED ORAL at 21:29

## 2020-08-01 RX ADMIN — MORPHINE SULFATE 15 MG: 15 TABLET, FILM COATED, EXTENDED RELEASE ORAL at 09:10

## 2020-08-01 RX ADMIN — Medication 500 MG: at 17:39

## 2020-08-01 RX ADMIN — ASPIRIN 81 MG: 81 TABLET, COATED ORAL at 09:10

## 2020-08-01 RX ADMIN — MORPHINE SULFATE 15 MG: 15 TABLET ORAL at 15:44

## 2020-08-01 RX ADMIN — TRAMADOL HYDROCHLORIDE 50 MG: 50 TABLET, COATED ORAL at 00:48

## 2020-08-01 RX ADMIN — PREGABALIN 150 MG: 150 CAPSULE ORAL at 09:10

## 2020-08-01 RX ADMIN — PREGABALIN 150 MG: 150 CAPSULE ORAL at 21:29

## 2020-08-01 RX ADMIN — DOCUSATE SODIUM 50 MG AND SENNOSIDES 8.6 MG 2 TABLET: 8.6; 5 TABLET, FILM COATED ORAL at 09:09

## 2020-08-01 RX ADMIN — MAGNESIUM HYDROXIDE 30 ML: 400 SUSPENSION ORAL at 13:52

## 2020-08-01 RX ADMIN — MORPHINE SULFATE 15 MG: 15 TABLET ORAL at 09:10

## 2020-08-01 RX ADMIN — FERROUS SULFATE TAB 325 MG (65 MG ELEMENTAL FE) 325 MG: 325 (65 FE) TAB at 17:39

## 2020-08-01 RX ADMIN — TRAMADOL HYDROCHLORIDE 50 MG: 50 TABLET, COATED ORAL at 17:39

## 2020-08-01 RX ADMIN — ZOLPIDEM TARTRATE 5 MG: 5 TABLET ORAL at 00:48

## 2020-08-01 RX ADMIN — PREGABALIN 150 MG: 150 CAPSULE ORAL at 15:44

## 2020-08-01 RX ADMIN — Medication 500 MG: at 09:10

## 2020-08-01 RX ADMIN — OMEPRAZOLE 20 MG: 20 CAPSULE, DELAYED RELEASE ORAL at 09:09

## 2020-08-01 RX ADMIN — MORPHINE SULFATE 15 MG: 15 TABLET, FILM COATED, EXTENDED RELEASE ORAL at 21:28

## 2020-08-01 RX ADMIN — Medication 2000 UNITS: at 09:09

## 2020-08-01 RX ADMIN — ZOLPIDEM TARTRATE 5 MG: 5 TABLET ORAL at 23:57

## 2020-08-01 RX ADMIN — MORPHINE SULFATE 15 MG: 15 TABLET ORAL at 12:20

## 2020-08-01 RX ADMIN — MORPHINE SULFATE 15 MG: 15 TABLET ORAL at 21:28

## 2020-08-01 RX ADMIN — MORPHINE SULFATE 15 MG: 15 TABLET ORAL at 06:05

## 2020-08-01 RX ADMIN — MORPHINE SULFATE 15 MG: 15 TABLET ORAL at 03:47

## 2020-08-01 RX ADMIN — NICOTINE 7 MG: 7 PATCH, EXTENDED RELEASE TRANSDERMAL at 17:40

## 2020-08-01 RX ADMIN — TRAMADOL HYDROCHLORIDE 50 MG: 50 TABLET, COATED ORAL at 12:16

## 2020-08-01 RX ADMIN — MORPHINE SULFATE 15 MG: 15 TABLET ORAL at 19:16

## 2020-08-01 RX ADMIN — FERROUS SULFATE TAB 325 MG (65 MG ELEMENTAL FE) 325 MG: 325 (65 FE) TAB at 09:10

## 2020-08-01 ASSESSMENT — LIFESTYLE VARIABLES
EVER FELT BAD OR GUILTY ABOUT YOUR DRINKING: NO
ON A TYPICAL DAY WHEN YOU DRINK ALCOHOL HOW MANY DRINKS DO YOU HAVE: 0
TOTAL SCORE: 0
TOTAL SCORE: 0
ALCOHOL_USE: NO
CONSUMPTION TOTAL: NEGATIVE
HAVE PEOPLE ANNOYED YOU BY CRITICIZING YOUR DRINKING: NO
TOTAL SCORE: 0
HOW MANY TIMES IN THE PAST YEAR HAVE YOU HAD 5 OR MORE DRINKS IN A DAY: 0
AVERAGE NUMBER OF DAYS PER WEEK YOU HAVE A DRINK CONTAINING ALCOHOL: 0
HAVE YOU EVER FELT YOU SHOULD CUT DOWN ON YOUR DRINKING: NO
EVER HAD A DRINK FIRST THING IN THE MORNING TO STEADY YOUR NERVES TO GET RID OF A HANGOVER: NO

## 2020-08-01 ASSESSMENT — GAIT ASSESSMENTS
DEVIATION: ANTALGIC;BRADYKINETIC
ASSISTIVE DEVICE: FRONT WHEEL WALKER
GAIT LEVEL OF ASSIST: STAND BY ASSIST
DISTANCE (FEET): 50
DEVIATION: ANTALGIC;BRADYKINETIC
ASSISTIVE DEVICE: FRONT WHEEL WALKER
DISTANCE (FEET): 50
GAIT LEVEL OF ASSIST: STAND BY ASSIST

## 2020-08-01 ASSESSMENT — ACTIVITIES OF DAILY LIVING (ADL)
BED_CHAIR_WHEELCHAIR_TRANSFER_DESCRIPTION: ADAPTIVE EQUIPMENT;INCREASED TIME
BED_CHAIR_WHEELCHAIR_TRANSFER_DESCRIPTION: ADAPTIVE EQUIPMENT;INCREASED TIME

## 2020-08-01 NOTE — THERAPY
"Occupational Therapy  Daily Treatment     Patient Name: Richard Hubbard II  Age:  45 y.o., Sex:  male  Medical Record #: [id number]  Today's Date: 8/1/2020     Precautions  Precautions: (P) Fall Risk, Weight Bearing As Tolerated Right Lower Extremity  Comments: (P) No pelvic precautions, ok for stretching and ROM and quad isometrics, knee immobilizer per patient preference    Safety   ADL Safety : Requires Supervision for Safety  Bathroom Safety: Requires Supervision for Safety  Comments: see below notes for ADL performance details.    Subjective    \" I remember what you  You told me about the grab bars. \"     Objective       08/01/20 1001   Precautions   Precautions Fall Risk;Weight Bearing As Tolerated Right Lower Extremity   Comments No pelvic precautions, ok for stretching and ROM and quad isometrics, knee immobilizer per patient preference   Functional Level of Assist   Bed, Chair, Wheelchair Transfer Supervised  ( bed to w/c )   Sitting Upper Body Exercises   Other Exercise Nustep   x 10 minutes  workload  2.    focus on   patient controlled  ROM or right knee flexion      Flexion maxed out at approximately 50 degrees.     Seated at table  worke don bilateral  hand exercises with  pink theraputty    and pinch   thumb abd and adduction as well as flexion and extension.    Interdisciplinary Plan of Care Collaboration   Patient Position at End of Therapy Seated;Call Light within Reach;Tray Table within Reach   OT Total Time Spent   OT Individual Total Time Spent (Mins) 60   OT Charge Group   OT Therapeutic Exercise  4      Reviewed shower set up.relucatnt to install grab bars into tile. Discussed the use of suction cup grab bars  As long as they would be used for steady assist  When standing  And patient would push off of shower seat for sit to stand.     Patient verbalized understanding     Assessment     participates to the best of his ability   presented with right ankle pain and  A \" catch\" in the " joint  Post  NUstep performance   Relieved with joint mobilization     Strengths: Able to follow instructions, Alert and oriented, Independent prior level of function, Effective communication skills, Pleasant and cooperative, Supportive family  Barriers: Fatigue, Decreased endurance, Generalized weakness, Impaired activity tolerance, Impaired balance, Pain    Plan    Right LE MLD and possibly kinesiotaping  Next session   To  Assist with edema reduction and promote improved ROM .     Occupational Therapy Goals     Problem: Dressing     Dates: Start: 07/28/20       Goal: STG-Within one week, patient will dress LB     Dates: Start: 07/28/20       Description: 1) Individualized Goal:  Min assist with AE as needed  2) Interventions:  OT Self Care/ADL, OT Manual Ther Technique, OT Neuro Re-Ed/Balance, OT Sensory Int Techniques, OT Therapeutic Activity, OT Evaluation, and OT Therapeutic Exercise      Note:     Goal Note filed on 07/30/20 1102 by Wojciech Clements, C.O.T.A.    Patient Min assist to don underwear and pants     Requires mod assist for right sock and shoe   Limited by decreased strength/ endurance  limited ROM  right knee                         Problem: Functional Transfers     Dates: Start: 07/28/20       Description: 1) Individualized Goal:  CGA    2) Interventions:  OT Self Care/ADL, OT Manual Ther Technique, OT Neuro Re-Ed/Balance, OT Sensory Int Techniques, OT Therapeutic Activity, OT Evaluation, and OT Therapeutic Exercise    Goal: STG-Within one week, patient will transfer to toilet     Dates: Start: 07/28/20       Description: 1) Individualized Goal:  CGA    2) Interventions:  OT Self Care/ADL, OT Manual Ther Technique, OT Neuro Re-Ed/Balance, OT Sensory Int Techniques, OT Therapeutic Activity, OT Evaluation, and OT Therapeutic Exercise    Note:     Goal Note filed on 07/30/20 1102 by Wojciech Clements, C.O.T.A.    Not yet observed                           Problem: IADL's     Dates: Start: 07/28/20        Goal: STG-Within one week, patient will access kitchen area     Dates: Start: 07/28/20       Description: 1) Individualized Goal:  Min assist with FWW    2) Interventions:  OT Self Care/ADL, OT Manual Ther Technique, OT Neuro Re-Ed/Balance, OT Sensory Int Techniques, OT Therapeutic Activity, OT Evaluation, and OT Therapeutic Exercise    Note:     Goal Note filed on 07/30/20 1102 by Wojciech Clements C.O.T.A.    Not yet addressed                            Problem: OT Long Term Goals     Dates: Start: 07/28/20       Goal: LTG-By discharge, patient will complete basic self care tasks     Dates: Start: 07/28/20       Description: 1) Individualized Goal:  Mod I with AE as needed    2) Interventions:  OT Self Care/ADL, OT Manual Ther Technique, OT Neuro Re-Ed/Balance, OT Sensory Int Techniques, OT Therapeutic Activity, OT Evaluation, and OT Therapeutic Exercise          Goal: LTG-By discharge, patient will perform bathroom transfers     Dates: Start: 07/28/20       Description: 1) Individualized Goal:  Mod I   2) Interventions:  OT Self Care/ADL, OT Manual Ther Technique, OT Neuro Re-Ed/Balance, OT Sensory Int Techniques, OT Therapeutic Activity, OT Evaluation, and OT Therapeutic Exercise

## 2020-08-01 NOTE — CARE PLAN
Problem: Safety  Goal: Will remain free from injury  Description: Pt uses call light consistently and appropriately. Waits for assistance does not attempt self transfer this shift. Able to verbalize needs.   Outcome: PROGRESSING AS EXPECTED

## 2020-08-01 NOTE — THERAPY
"Physical Therapy   Daily Treatment     Patient Name: Richard Hubbard II  Age:  45 y.o., Sex:  male  Medical Record #: [id number]  Today's Date: 8/1/2020     Precautions  Precautions: (P) Fall Risk, Weight Bearing As Tolerated Right Lower Extremity  Comments: (P) No pelvic precautions, ok for stretching and ROM and quad isometrics, knee immobilizer per patient preference    Subjective    Patient seated EOB and agreeable to therapy, reporting poor sleep due to pain.    \"You're silvia that I'm even letting you work with me.\"     Objective       08/01/20 0731   Precautions   Precautions Fall Risk;Weight Bearing As Tolerated Right Lower Extremity   Comments No pelvic precautions, ok for stretching and ROM and quad isometrics, knee immobilizer per patient preference   Pain 0 - 10 Group   Location Knee   Location Orientation Right   Pain Rating Scale (NPRS) 8   Description Shooting   Gait Functional Level of Assist    Gait Level Of Assist Stand by Assist   Assistive Device Front Wheel Walker   Distance (Feet) 50   # of Times Distance was Traveled 2   Deviation Antalgic;Bradykinetic  (heavy UE use, decreased foot clearance )   Transfer Functional Level of Assist   Bed, Chair, Wheelchair Transfer Stand by Assist   Bed Chair Wheelchair Transfer Description Adaptive equipment;Increased time  (stand step transfer with FWW)   Sitting Lower Body Exercises   Hamstring Curl Right;2 sets of 10  (with foot on towel, intermittent LLE overpressure)   Bed Mobility    Supine to Sit Stand by Assist   Sit to Stand Stand by Assist   Scooting Supervised   Rolling Supervised   Interdisciplinary Plan of Care Collaboration   Patient Position at End of Therapy Edge of Bed;Call Light within Reach;Tray Table within Reach;Phone within Reach  (ice applied to R knee)   PT Total Time Spent   PT Individual Total Time Spent (Mins) 30   PT Charge Group   PT Gait Training 1   PT Therapeutic Exercise 1     Chapo EOB with supervision and " "extra time, limited by knee pain.    Donning of RLE MIMI stocking for edema management.    Assessment    Patient tolerated session fairly, continues to be limited by knee pain affecting tolerance to activity. Patient able to increase R knee PROM in sitting position, ~70 degrees.    Strengths: Supportive family, Motivated for self care and independence  Barriers: Limited mobility, Impaired balance, Impaired activity tolerance, Pain, Pain poorly managed, Decreased endurance, Fatigue    Plan    Gait training with FWW, transfer training, BLE strengthening, ROM and stretching, standing and activity tolerance, stair/curb negotiation as tolerated, standing balance.    Physical Therapy Problems     Problem: Mobility     Dates: Start: 07/28/20       Goal: STG-Within one week, patient will ascend and descend four to six stairs     Dates: Start: 07/28/20       Description: 1) Individualized goal:  Patient will amb up/down 2\" // bars 2x CGA.   2) Interventions:  PT Gait Training, PT Self Care/Home Eval, PT Therapeutic Exercises, PT Neuro Re-Ed/Balance, PT Aquatic Therapy, PT Therapeutic Activity, PT Manual Therapy, and PT Evaluation                  Problem: Mobility Transfers     Dates: Start: 07/28/20       Goal: STG-Within one week, patient will transfer bed to chair     Dates: Start: 07/28/20       Description: 1) Individualized goal:  Patient will transfer CGA SPT with FWW, SBA bed mob   2) Interventions:  PT Gait Training, PT Self Care/Home Eval, PT Therapeutic Exercises, PT Neuro Re-Ed/Balance, PT Aquatic Therapy, PT Therapeutic Activity, PT Manual Therapy, and PT Evaluation                  Problem: PT-Long Term Goals     Dates: Start: 07/28/20       Goal: LTG-By discharge, patient will ambulate     Dates: Start: 07/28/20       Description: 1) Individualized goal:  Patient will amb >50 ft indoors mod I with FWW  2) Interventions:  PT Gait Training, PT Self Care/Home Eval, PT Therapeutic Exercises, PT Neuro Re-Ed/Balance, " PT Aquatic Therapy, PT Therapeutic Activity, PT Manual Therapy, and PT Evaluation            Goal: LTG-By discharge, patient will transfer one surface to another     Dates: Start: 07/28/20       Description: 1) Individualized goal: Patient will transfer Mod I with FWW STS/SPT   2) Interventions:  PT Gait Training, PT Self Care/Home Eval, PT Therapeutic Exercises, PT Neuro Re-Ed/Balance, PT Aquatic Therapy, PT Therapeutic Activity, PT Manual Therapy, and PT Evaluation            Goal: LTG-By discharge, patient will ambulate up/down 4-6 stairs     Dates: Start: 07/28/20       Description: 1) Individualized goal:  Patient will amb up/down 2 standard stairs with no railing CGA  2) Interventions:  PT Gait Training, PT Self Care/Home Eval, PT Therapeutic Exercises, PT Neuro Re-Ed/Balance, PT Aquatic Therapy, PT Therapeutic Activity, PT Manual Therapy, and PT Evaluation

## 2020-08-01 NOTE — CARE PLAN
Problem: Safety  Goal: Will remain free from injury  Description: Pt uses call light consistently and appropriately. Waits for assistance does not attempt self transfer this shift. Able to verbalize needs.   Outcome: PROGRESSING AS EXPECTED  Note: Pt uses call light consistently and appropriately. Waits for assistance does not attempt self transfer this shift. Able to verbalize needs.

## 2020-08-01 NOTE — THERAPY
Physical Therapy   Daily Treatment     Patient Name: Richadr Hubbard II  Age:  45 y.o., Sex:  male  Medical Record #: [id number]  Today's Date: 8/1/2020     Precautions  Precautions: (P) Fall Risk, Weight Bearing As Tolerated Right Lower Extremity  Comments: (P) No pelvic precautions, ok for stretching/ROM and strengthening    Subjective    Patient seated EOB and agreeable to therapy.     Objective       08/01/20 1401   Precautions   Precautions Fall Risk;Weight Bearing As Tolerated Right Lower Extremity   Comments No pelvic precautions, ok for stretching/ROM and strengthening   Gait Functional Level of Assist    Gait Level Of Assist Stand by Assist   Assistive Device Front Wheel Walker   Distance (Feet) 50   # of Times Distance was Traveled 2   Deviation Antalgic;Bradykinetic  (heavy UE use, decreased foot clearance)   Transfer Functional Level of Assist   Bed, Chair, Wheelchair Transfer Supervised   Bed Chair Wheelchair Transfer Description Adaptive equipment;Increased time  (stand step transfer with FWW, kicks RLE anterior)   Supine Lower Body Exercise   Bridges 2 sets of 10   Knee to Chest Right ;2 sets of 10  (using sheet for knee flex stretch and press into extension)   Other Exercises straight leg bridge on bolster 2x10   Bed Mobility    Supine to Sit Stand by Assist   Sit to Supine Stand by Assist   Sit to Stand Stand by Assist   Scooting Supervised   Rolling Supervised   Interdisciplinary Plan of Care Collaboration   Patient Position at End of Therapy Seated;Call Light within Reach;Tray Table within Reach;Phone within Reach  (CNA present)   PT Total Time Spent   PT Individual Total Time Spent (Mins) 60   PT Charge Group   PT Gait Training 1   PT Therapeutic Exercise 2   PT Therapeutic Activities 1     Supine RLE knee flexion stretch in 90/90 position with contract-relax technique 2x90 seconds  Supine RLE knee extension stretch on bolster with contract-relax technique 2x90  "seconds.    Assessment    Patient tolerated session well, more receptive to ROM exercises and able to tolerate more aggressive stretching. R knee ROM making slow but steady progress. Per MD no restrictions on ROM, stretching, and strengthening exercises; continue to encourage patient to push ROM as much as can tolerate.    Strengths: Supportive family, Motivated for self care and independence  Barriers: Limited mobility, Impaired balance, Impaired activity tolerance, Pain, Pain poorly managed, Decreased endurance, Fatigue    Plan    Gait training with FWW, transfer training reducing R knee ROM compensations, BLE strengthening, ROM and stretching (knee flexion and extension), standing and activity tolerance, stair/curb negotiation as tolerated, standing balance.    Physical Therapy Problems     Problem: Mobility     Dates: Start: 07/28/20       Goal: STG-Within one week, patient will ascend and descend four to six stairs     Dates: Start: 07/28/20       Description: 1) Individualized goal:  Patient will amb up/down 2\" // bars 2x CGA.   2) Interventions:  PT Gait Training, PT Self Care/Home Eval, PT Therapeutic Exercises, PT Neuro Re-Ed/Balance, PT Aquatic Therapy, PT Therapeutic Activity, PT Manual Therapy, and PT Evaluation                  Problem: Mobility Transfers     Dates: Start: 07/28/20       Goal: STG-Within one week, patient will transfer bed to chair     Dates: Start: 07/28/20       Description: 1) Individualized goal:  Patient will transfer CGA SPT with FWW, SBA bed mob   2) Interventions:  PT Gait Training, PT Self Care/Home Eval, PT Therapeutic Exercises, PT Neuro Re-Ed/Balance, PT Aquatic Therapy, PT Therapeutic Activity, PT Manual Therapy, and PT Evaluation                  Problem: PT-Long Term Goals     Dates: Start: 07/28/20       Goal: LTG-By discharge, patient will ambulate     Dates: Start: 07/28/20       Description: 1) Individualized goal:  Patient will amb >50 ft indoors mod I with FWW  2) " Interventions:  PT Gait Training, PT Self Care/Home Eval, PT Therapeutic Exercises, PT Neuro Re-Ed/Balance, PT Aquatic Therapy, PT Therapeutic Activity, PT Manual Therapy, and PT Evaluation            Goal: LTG-By discharge, patient will transfer one surface to another     Dates: Start: 07/28/20       Description: 1) Individualized goal: Patient will transfer Mod I with FWW STS/SPT   2) Interventions:  PT Gait Training, PT Self Care/Home Eval, PT Therapeutic Exercises, PT Neuro Re-Ed/Balance, PT Aquatic Therapy, PT Therapeutic Activity, PT Manual Therapy, and PT Evaluation            Goal: LTG-By discharge, patient will ambulate up/down 4-6 stairs     Dates: Start: 07/28/20       Description: 1) Individualized goal:  Patient will amb up/down 2 standard stairs with no railing CGA  2) Interventions:  PT Gait Training, PT Self Care/Home Eval, PT Therapeutic Exercises, PT Neuro Re-Ed/Balance, PT Aquatic Therapy, PT Therapeutic Activity, PT Manual Therapy, and PT Evaluation

## 2020-08-01 NOTE — PROGRESS NOTES
Right knee has 3+ edema.  Patient states he is always in pain with his knee. Ice applied as needed.

## 2020-08-01 NOTE — THERAPY
"Occupational Therapy  Daily Treatment     Patient Name: Richard Hubbard II  Age:  45 y.o., Sex:  male  Medical Record #: [id number]  Today's Date: 8/1/2020     Precautions  Precautions: (P) Fall Risk, Weight Bearing As Tolerated Right Lower Extremity  Comments: (P) No pelvic precautions, ok for stretching and ROM and quad isometrics    Safety   ADL Safety : Requires Supervision for Safety  Bathroom Safety: Requires Supervision for Safety  Comments: see below notes for ADL performance details.    Subjective    \" So how does this work?\"  Referring to manual lymph drainage technique performed        Objective     08/01/20 1301   Precautions   Precautions Fall Risk;Weight Bearing As Tolerated Right Lower Extremity   Comments No pelvic precautions, ok for stretching and ROM and quad isometrics   Interdisciplinary Plan of Care Collaboration   Patient Position at End of Therapy In Bed;Call Light within Reach;Tray Table within Reach   OT Total Time Spent   OT Individual Total Time Spent (Mins) 30   OT Charge Group   OT Manual Therapy Technique 2      Educated patient regarding  Purpose  Of utilizing  Manual Lymph Drainage ( MLD)   And Kinesiotaping of right LE  To  Decrease  Right LE edema and improve joint mobility     MLD  To  Right LE   Right groin to just below the knee  X 20 minutes    Instructed patient on self MLD from groin to knee x 10 minutes. Completed with fair +       Would benefit from continued practice for improved technique.     Kinesiotape applied along  Right LE Lymphatic tracts   Mid knee up toward groin.     Assessment     right knee area though not visible with decreased edema   Tissue  Was less hard and skin more pliable  By end of session      Strengths: Able to follow instructions, Alert and oriented, Independent prior level of function, Effective communication skills, Pleasant and cooperative, Supportive family  Barriers: Fatigue, Decreased endurance, Generalized weakness, Impaired activity " tolerance, Impaired balance, Pain    Plan    ADL/IADLs and related functional mobility, AE training, R /pinch strengthening, standing tolerance/balance.  would benefit from continued  MLD  For edema management   Right LE        Occupational Therapy Goals     Problem: Dressing     Dates: Start: 07/28/20       Goal: STG-Within one week, patient will dress LB     Dates: Start: 07/28/20       Description: 1) Individualized Goal:  Min assist with AE as needed  2) Interventions:  OT Self Care/ADL, OT Manual Ther Technique, OT Neuro Re-Ed/Balance, OT Sensory Int Techniques, OT Therapeutic Activity, OT Evaluation, and OT Therapeutic Exercise      Note:     Goal Note filed on 07/30/20 1102 by Wojciech Clements, C.O.T.A.    Patient Min assist to don underwear and pants     Requires mod assist for right sock and shoe   Limited by decreased strength/ endurance  limited ROM  right knee                         Problem: Functional Transfers     Dates: Start: 07/28/20       Description: 1) Individualized Goal:  CGA    2) Interventions:  OT Self Care/ADL, OT Manual Ther Technique, OT Neuro Re-Ed/Balance, OT Sensory Int Techniques, OT Therapeutic Activity, OT Evaluation, and OT Therapeutic Exercise    Goal: STG-Within one week, patient will transfer to toilet     Dates: Start: 07/28/20       Description: 1) Individualized Goal:  CGA    2) Interventions:  OT Self Care/ADL, OT Manual Ther Technique, OT Neuro Re-Ed/Balance, OT Sensory Int Techniques, OT Therapeutic Activity, OT Evaluation, and OT Therapeutic Exercise    Note:     Goal Note filed on 07/30/20 1102 by Wojciech Clements, C.O.T.A.    Not yet observed                           Problem: IADL's     Dates: Start: 07/28/20       Goal: STG-Within one week, patient will access kitchen area     Dates: Start: 07/28/20       Description: 1) Individualized Goal:  Min assist with FWW    2) Interventions:  OT Self Care/ADL, OT Manual Ther Technique, OT Neuro Re-Ed/Balance, OT Sensory Int  Techniques, OT Therapeutic Activity, OT Evaluation, and OT Therapeutic Exercise    Note:     Goal Note filed on 07/30/20 1102 by Wojciech Clements C.O.T.A.    Not yet addressed                            Problem: OT Long Term Goals     Dates: Start: 07/28/20       Goal: LTG-By discharge, patient will complete basic self care tasks     Dates: Start: 07/28/20       Description: 1) Individualized Goal:  Mod I with AE as needed    2) Interventions:  OT Self Care/ADL, OT Manual Ther Technique, OT Neuro Re-Ed/Balance, OT Sensory Int Techniques, OT Therapeutic Activity, OT Evaluation, and OT Therapeutic Exercise          Goal: LTG-By discharge, patient will perform bathroom transfers     Dates: Start: 07/28/20       Description: 1) Individualized Goal:  Mod I   2) Interventions:  OT Self Care/ADL, OT Manual Ther Technique, OT Neuro Re-Ed/Balance, OT Sensory Int Techniques, OT Therapeutic Activity, OT Evaluation, and OT Therapeutic Exercise

## 2020-08-02 PROCEDURE — 770010 HCHG ROOM/CARE - REHAB SEMI PRIVAT*

## 2020-08-02 PROCEDURE — A9270 NON-COVERED ITEM OR SERVICE: HCPCS | Performed by: PHYSICAL MEDICINE & REHABILITATION

## 2020-08-02 PROCEDURE — 700102 HCHG RX REV CODE 250 W/ 637 OVERRIDE(OP): Performed by: PHYSICAL MEDICINE & REHABILITATION

## 2020-08-02 RX ORDER — AMOXICILLIN 250 MG
CAPSULE ORAL
Status: ACTIVE
Start: 2020-08-02 | End: 2020-08-03

## 2020-08-02 RX ADMIN — MORPHINE SULFATE 15 MG: 15 TABLET ORAL at 01:13

## 2020-08-02 RX ADMIN — MORPHINE SULFATE 15 MG: 15 TABLET ORAL at 07:28

## 2020-08-02 RX ADMIN — MORPHINE SULFATE 15 MG: 15 TABLET ORAL at 09:13

## 2020-08-02 RX ADMIN — MORPHINE SULFATE 15 MG: 15 TABLET ORAL at 18:26

## 2020-08-02 RX ADMIN — PREGABALIN 150 MG: 150 CAPSULE ORAL at 21:58

## 2020-08-02 RX ADMIN — ZOLPIDEM TARTRATE 5 MG: 5 TABLET ORAL at 01:13

## 2020-08-02 RX ADMIN — FERROUS SULFATE TAB 325 MG (65 MG ELEMENTAL FE) 325 MG: 325 (65 FE) TAB at 08:23

## 2020-08-02 RX ADMIN — PREGABALIN 150 MG: 150 CAPSULE ORAL at 08:23

## 2020-08-02 RX ADMIN — BISACODYL 10 MG: 10 SUPPOSITORY RECTAL at 09:13

## 2020-08-02 RX ADMIN — MORPHINE SULFATE 15 MG: 15 TABLET ORAL at 04:00

## 2020-08-02 RX ADMIN — NICOTINE 7 MG: 7 PATCH, EXTENDED RELEASE TRANSDERMAL at 17:18

## 2020-08-02 RX ADMIN — TRAMADOL HYDROCHLORIDE 50 MG: 50 TABLET, COATED ORAL at 16:32

## 2020-08-02 RX ADMIN — TRAMADOL HYDROCHLORIDE 50 MG: 50 TABLET, COATED ORAL at 23:40

## 2020-08-02 RX ADMIN — MORPHINE SULFATE 15 MG: 15 TABLET, FILM COATED, EXTENDED RELEASE ORAL at 09:13

## 2020-08-02 RX ADMIN — TRAMADOL HYDROCHLORIDE 50 MG: 50 TABLET, COATED ORAL at 07:33

## 2020-08-02 RX ADMIN — Medication 500 MG: at 17:18

## 2020-08-02 RX ADMIN — MORPHINE SULFATE 15 MG: 15 TABLET ORAL at 22:07

## 2020-08-02 RX ADMIN — ASPIRIN 81 MG: 81 TABLET, COATED ORAL at 08:23

## 2020-08-02 RX ADMIN — MORPHINE SULFATE 15 MG: 15 TABLET ORAL at 15:11

## 2020-08-02 RX ADMIN — Medication 500 MG: at 08:23

## 2020-08-02 RX ADMIN — ZOLPIDEM TARTRATE 5 MG: 5 TABLET ORAL at 23:40

## 2020-08-02 RX ADMIN — OMEPRAZOLE 20 MG: 20 CAPSULE, DELAYED RELEASE ORAL at 08:23

## 2020-08-02 RX ADMIN — DOCUSATE SODIUM 50 MG AND SENNOSIDES 8.6 MG 2 TABLET: 8.6; 5 TABLET, FILM COATED ORAL at 21:00

## 2020-08-02 RX ADMIN — DOCUSATE SODIUM 50 MG AND SENNOSIDES 8.6 MG 2 TABLET: 8.6; 5 TABLET, FILM COATED ORAL at 08:23

## 2020-08-02 RX ADMIN — PREGABALIN 150 MG: 150 CAPSULE ORAL at 15:11

## 2020-08-02 RX ADMIN — TRAMADOL HYDROCHLORIDE 50 MG: 50 TABLET, COATED ORAL at 11:50

## 2020-08-02 RX ADMIN — MORPHINE SULFATE 15 MG: 15 TABLET ORAL at 12:58

## 2020-08-02 RX ADMIN — MORPHINE SULFATE 15 MG: 15 TABLET, FILM COATED, EXTENDED RELEASE ORAL at 21:59

## 2020-08-02 RX ADMIN — FERROUS SULFATE TAB 325 MG (65 MG ELEMENTAL FE) 325 MG: 325 (65 FE) TAB at 17:18

## 2020-08-02 RX ADMIN — Medication 2000 UNITS: at 08:26

## 2020-08-02 RX ADMIN — MAGNESIUM HYDROXIDE 30 ML: 400 SUSPENSION ORAL at 08:39

## 2020-08-02 ASSESSMENT — PATIENT HEALTH QUESTIONNAIRE - PHQ9
1. LITTLE INTEREST OR PLEASURE IN DOING THINGS: NOT AT ALL
SUM OF ALL RESPONSES TO PHQ9 QUESTIONS 1 AND 2: 0
1. LITTLE INTEREST OR PLEASURE IN DOING THINGS: NOT AT ALL
SUM OF ALL RESPONSES TO PHQ9 QUESTIONS 1 AND 2: 0
2. FEELING DOWN, DEPRESSED, IRRITABLE, OR HOPELESS: NOT AT ALL
2. FEELING DOWN, DEPRESSED, IRRITABLE, OR HOPELESS: NOT AT ALL

## 2020-08-02 NOTE — CARE PLAN
Problem: Safety  Goal: Will remain free from injury  Description: Pt uses call light consistently and appropriately. Waits for assistance does not attempt self transfer this shift. Able to verbalize needs.   Outcome: PROGRESSING AS EXPECTED     Problem: Pain Management  Goal: Pain level will decrease to patient's comfort goal  Description: Pt able to participate in therapies and activities this shift.   Outcome: PROGRESSING AS EXPECTED

## 2020-08-02 NOTE — CARE PLAN
Problem: Safety  Goal: Will remain free from injury  Description: Pt uses call light consistently and appropriately. Waits for assistance does not attempt self transfer this shift. Able to verbalize needs.   Outcome: PROGRESSING AS EXPECTED  Note: Pt uses call light consistently and appropriately. Waits for assistance does not attempt self transfer this shift. Able to verbalize needs.      Problem: Pain Management  Goal: Pain level will decrease to patient's comfort goal  Outcome: PROGRESSING AS EXPECTED  Note: Medicated with scheduled and prn Morphine per orders and complaint of knee pain.

## 2020-08-03 PROCEDURE — 770010 HCHG ROOM/CARE - REHAB SEMI PRIVAT*

## 2020-08-03 PROCEDURE — 99406 BEHAV CHNG SMOKING 3-10 MIN: CPT | Mod: GZ | Performed by: PHYSICAL MEDICINE & REHABILITATION

## 2020-08-03 PROCEDURE — 99232 SBSQ HOSP IP/OBS MODERATE 35: CPT | Mod: 25 | Performed by: PHYSICAL MEDICINE & REHABILITATION

## 2020-08-03 PROCEDURE — 97116 GAIT TRAINING THERAPY: CPT | Mod: CQ

## 2020-08-03 PROCEDURE — 700102 HCHG RX REV CODE 250 W/ 637 OVERRIDE(OP): Performed by: PHYSICAL MEDICINE & REHABILITATION

## 2020-08-03 PROCEDURE — 97530 THERAPEUTIC ACTIVITIES: CPT

## 2020-08-03 PROCEDURE — 97530 THERAPEUTIC ACTIVITIES: CPT | Mod: CQ

## 2020-08-03 PROCEDURE — A9270 NON-COVERED ITEM OR SERVICE: HCPCS | Performed by: PHYSICAL MEDICINE & REHABILITATION

## 2020-08-03 PROCEDURE — 97110 THERAPEUTIC EXERCISES: CPT | Mod: CQ

## 2020-08-03 RX ORDER — POLYETHYLENE GLYCOL 3350 17 G/17G
1 POWDER, FOR SOLUTION ORAL DAILY
Status: DISCONTINUED | OUTPATIENT
Start: 2020-08-04 | End: 2020-08-08 | Stop reason: HOSPADM

## 2020-08-03 RX ORDER — BISACODYL 10 MG
10 SUPPOSITORY, RECTAL RECTAL
Status: DISCONTINUED | OUTPATIENT
Start: 2020-08-03 | End: 2020-08-08 | Stop reason: HOSPADM

## 2020-08-03 RX ORDER — AMOXICILLIN 250 MG
2 CAPSULE ORAL 2 TIMES DAILY
Status: DISCONTINUED | OUTPATIENT
Start: 2020-08-04 | End: 2020-08-08 | Stop reason: HOSPADM

## 2020-08-03 RX ADMIN — NICOTINE 7 MG: 7 PATCH, EXTENDED RELEASE TRANSDERMAL at 17:01

## 2020-08-03 RX ADMIN — ZOLPIDEM TARTRATE 5 MG: 5 TABLET ORAL at 00:36

## 2020-08-03 RX ADMIN — DOCUSATE SODIUM 50 MG AND SENNOSIDES 8.6 MG 2 TABLET: 8.6; 5 TABLET, FILM COATED ORAL at 21:26

## 2020-08-03 RX ADMIN — MORPHINE SULFATE 15 MG: 15 TABLET ORAL at 14:32

## 2020-08-03 RX ADMIN — MORPHINE SULFATE 15 MG: 15 TABLET ORAL at 19:45

## 2020-08-03 RX ADMIN — MORPHINE SULFATE 15 MG: 15 TABLET ORAL at 13:33

## 2020-08-03 RX ADMIN — Medication 2000 UNITS: at 08:03

## 2020-08-03 RX ADMIN — ZOLPIDEM TARTRATE 5 MG: 5 TABLET ORAL at 23:12

## 2020-08-03 RX ADMIN — MORPHINE SULFATE 15 MG: 15 TABLET ORAL at 06:36

## 2020-08-03 RX ADMIN — PREGABALIN 150 MG: 150 CAPSULE ORAL at 08:05

## 2020-08-03 RX ADMIN — Medication 500 MG: at 08:05

## 2020-08-03 RX ADMIN — PREGABALIN 150 MG: 150 CAPSULE ORAL at 14:32

## 2020-08-03 RX ADMIN — MORPHINE SULFATE 15 MG: 15 TABLET ORAL at 21:29

## 2020-08-03 RX ADMIN — FERROUS SULFATE TAB 325 MG (65 MG ELEMENTAL FE) 325 MG: 325 (65 FE) TAB at 17:01

## 2020-08-03 RX ADMIN — Medication 500 MG: at 17:01

## 2020-08-03 RX ADMIN — MAGNESIUM HYDROXIDE 30 ML: 400 SUSPENSION ORAL at 13:33

## 2020-08-03 RX ADMIN — FERROUS SULFATE TAB 325 MG (65 MG ELEMENTAL FE) 325 MG: 325 (65 FE) TAB at 08:05

## 2020-08-03 RX ADMIN — ZOLPIDEM TARTRATE 5 MG: 5 TABLET ORAL at 23:49

## 2020-08-03 RX ADMIN — TRAMADOL HYDROCHLORIDE 50 MG: 50 TABLET, COATED ORAL at 17:01

## 2020-08-03 RX ADMIN — PREGABALIN 150 MG: 150 CAPSULE ORAL at 21:30

## 2020-08-03 RX ADMIN — OMEPRAZOLE 20 MG: 20 CAPSULE, DELAYED RELEASE ORAL at 08:04

## 2020-08-03 RX ADMIN — TRAMADOL HYDROCHLORIDE 50 MG: 50 TABLET, COATED ORAL at 12:04

## 2020-08-03 RX ADMIN — TRAMADOL HYDROCHLORIDE 50 MG: 50 TABLET, COATED ORAL at 08:02

## 2020-08-03 RX ADMIN — ASPIRIN 81 MG: 81 TABLET, COATED ORAL at 08:05

## 2020-08-03 RX ADMIN — DOCUSATE SODIUM 50 MG AND SENNOSIDES 8.6 MG 2 TABLET: 8.6; 5 TABLET, FILM COATED ORAL at 09:00

## 2020-08-03 RX ADMIN — MORPHINE SULFATE 15 MG: 15 TABLET, FILM COATED, EXTENDED RELEASE ORAL at 08:04

## 2020-08-03 RX ADMIN — MORPHINE SULFATE 15 MG: 15 TABLET, FILM COATED, EXTENDED RELEASE ORAL at 21:30

## 2020-08-03 RX ADMIN — MORPHINE SULFATE 15 MG: 15 TABLET ORAL at 03:14

## 2020-08-03 RX ADMIN — MORPHINE SULFATE 15 MG: 15 TABLET ORAL at 08:04

## 2020-08-03 ASSESSMENT — GAIT ASSESSMENTS
GAIT LEVEL OF ASSIST: SUPERVISED
DEVIATION: ANTALGIC;BRADYKINETIC;DECREASED HEEL STRIKE;DECREASED TOE OFF
DEVIATION: ANTALGIC;DECREASED HEEL STRIKE;DECREASED TOE OFF;BRADYKINETIC
ASSISTIVE DEVICE: FRONT WHEEL WALKER
DISTANCE (FEET): 100
ASSISTIVE DEVICE: FRONT WHEEL WALKER
GAIT LEVEL OF ASSIST: SUPERVISED
DISTANCE (FEET): 50

## 2020-08-03 ASSESSMENT — ACTIVITIES OF DAILY LIVING (ADL): BED_CHAIR_WHEELCHAIR_TRANSFER_DESCRIPTION: ADAPTIVE EQUIPMENT;SUPERVISION FOR SAFETY

## 2020-08-03 NOTE — THERAPY
Occupational Therapy  Daily Treatment     Patient Name: Richard Hubbard II  Age:  45 y.o., Sex:  male  Medical Record #: [id number]  Today's Date: 8/3/2020     Precautions  Precautions: (P) Fall Risk, Weight Bearing As Tolerated Right Lower Extremity  Comments: (P) No pelvic precautions, ok for stretching/ROM and strengthening    Safety   ADL Safety : Requires Supervision for Safety  Bathroom Safety: Requires Supervision for Safety  Comments: see below notes for ADL performance details.    Subjective    Pt received seated edge of bed, agreeable to OT session     Objective       08/03/20 1301   Precautions   Precautions Fall Risk;Weight Bearing As Tolerated Right Lower Extremity   Comments No pelvic precautions, ok for stretching/ROM and strengthening   Balance   Comments in // bars, standing trials w/o UE support, pt able to tolerate ~30 seconds at a time, no LOB but limited by pain, with R foot pointing fwd, less pain when foot externally rotated 90 degrees   OT Total Time Spent   OT Individual Total Time Spent (Mins) 30   OT Charge Group   OT Therapy Activity 2     Assessment    Pt tolerated session fair, limited by pain in R knee with poor WB tolerance, continues to rely on UE support during standing but able to tolerate brief periods w/o UE support this session. Participated to best of ability.   Strengths: Able to follow instructions, Alert and oriented, Independent prior level of function, Effective communication skills, Pleasant and cooperative, Supportive family  Barriers: Fatigue, Decreased endurance, Generalized weakness, Impaired activity tolerance, Impaired balance, Pain    Plan    ADL/IADLs and related functional mobility, AE training, R /pinch strengthening, standing tolerance/balance.     Occupational Therapy Goals     Problem: Dressing     Dates: Start: 07/28/20       Goal: STG-Within one week, patient will dress LB     Dates: Start: 07/28/20       Description: 1) Individualized Goal:  Min  assist with AE as needed  2) Interventions:  OT Self Care/ADL, OT Manual Ther Technique, OT Neuro Re-Ed/Balance, OT Sensory Int Techniques, OT Therapeutic Activity, OT Evaluation, and OT Therapeutic Exercise      Note:     Goal Note filed on 07/30/20 1102 by Wojciech Clements, C.O.T.A.    Patient Min assist to don underwear and pants     Requires mod assist for right sock and shoe   Limited by decreased strength/ endurance  limited ROM  right knee                         Problem: Functional Transfers     Dates: Start: 07/28/20       Description: 1) Individualized Goal:  CGA    2) Interventions:  OT Self Care/ADL, OT Manual Ther Technique, OT Neuro Re-Ed/Balance, OT Sensory Int Techniques, OT Therapeutic Activity, OT Evaluation, and OT Therapeutic Exercise    Goal: STG-Within one week, patient will transfer to toilet     Dates: Start: 07/28/20       Description: 1) Individualized Goal:  CGA    2) Interventions:  OT Self Care/ADL, OT Manual Ther Technique, OT Neuro Re-Ed/Balance, OT Sensory Int Techniques, OT Therapeutic Activity, OT Evaluation, and OT Therapeutic Exercise    Note:     Goal Note filed on 07/30/20 1102 by Wojciech Clements, C.O.T.A.    Not yet observed                           Problem: IADL's     Dates: Start: 07/28/20       Goal: STG-Within one week, patient will access kitchen area     Dates: Start: 07/28/20       Description: 1) Individualized Goal:  Min assist with FWW    2) Interventions:  OT Self Care/ADL, OT Manual Ther Technique, OT Neuro Re-Ed/Balance, OT Sensory Int Techniques, OT Therapeutic Activity, OT Evaluation, and OT Therapeutic Exercise    Note:     Goal Note filed on 07/30/20 1102 by Wojciech Clements, C.O.T.A.    Not yet addressed                            Problem: OT Long Term Goals     Dates: Start: 07/28/20       Goal: LTG-By discharge, patient will complete basic self care tasks     Dates: Start: 07/28/20       Description: 1) Individualized Goal:  Mod I with AE as needed    2)  Interventions:  OT Self Care/ADL, OT Manual Ther Technique, OT Neuro Re-Ed/Balance, OT Sensory Int Techniques, OT Therapeutic Activity, OT Evaluation, and OT Therapeutic Exercise          Goal: LTG-By discharge, patient will perform bathroom transfers     Dates: Start: 07/28/20       Description: 1) Individualized Goal:  Mod I   2) Interventions:  OT Self Care/ADL, OT Manual Ther Technique, OT Neuro Re-Ed/Balance, OT Sensory Int Techniques, OT Therapeutic Activity, OT Evaluation, and OT Therapeutic Exercise

## 2020-08-03 NOTE — THERAPY
"Physical Therapy   Daily Treatment     Patient Name: Richrad Hubbard II  Age:  45 y.o., Sex:  male  Medical Record #: [id number]  Today's Date: 8/3/2020     Precautions  Precautions: Fall Risk, Weight Bearing As Tolerated Right Lower Extremity  Comments: No pelvic precautions, ok for stretching/ROM and strengthening    Subjective    Pt agreeable to PT session, requested assistance to sebastián knee-high compression stocking      Objective       08/03/20 0831   Gait Functional Level of Assist    Gait Level Of Assist Supervised   Assistive Device Front Wheel Walker   Distance (Feet) 50   # of Times Distance was Traveled 2   Deviation Antalgic;Decreased Heel Strike;Decreased Toe Off;Bradykinetic  (dec hip/knee flexion R LE)   Transfer Functional Level of Assist   Bed, Chair, Wheelchair Transfer Supervised   Bed Chair Wheelchair Transfer Description Adaptive equipment;Supervision for safety   Sitting Lower Body Exercises   Nustep Resistance Level 4;Time (See Comments)  (B LE/UE x 10')   Bed Mobility    Supine to Sit Supervised   Sit to Supine Supervised   Sit to Stand Stand by Assist   Scooting Supervised   Interdisciplinary Plan of Care Collaboration   Patient Position at End of Therapy In Bed;Call Light within Reach   PT Total Time Spent   PT Individual Total Time Spent (Mins) 60   PT Charge Group   PT Gait Training 1   PT Therapeutic Exercise 2   PT Therapeutic Activities 1   Supervising Physical Therapist Agata CROCKETT with skate board at EOM with over pressure 3 x 20\"  Instructed pt on completing qs in bed with knee in extension, 10 x each hour   Assessment    Pt tolerated seat level being decreased from 13 to 12 after 5 minutes on recumbent stepper. Knee flexion measure before and after the stepper exercise with an increase from 51° to 61°(measured on nustep with pt R LE on pedal)  Ice to R knee post rx    Strengths: Supportive family, Motivated for self care and independence  Barriers: Limited mobility, " "Impaired balance, Impaired activity tolerance, Pain, Pain poorly managed, Decreased endurance, Fatigue    Plan    Gait training with FWW, transfer training reducing R knee ROM compensations, BLE strengthening, ROM and stretching (knee flexion and extension), standing and activity tolerance, stair/curb negotiation as tolerated, standing balance.    Physical Therapy Problems     Problem: Mobility     Dates: Start: 07/28/20       Goal: STG-Within one week, patient will ascend and descend four to six stairs     Dates: Start: 07/28/20       Description: 1) Individualized goal:  Patient will amb up/down 2\" // bars 2x CGA.   2) Interventions:  PT Gait Training, PT Self Care/Home Eval, PT Therapeutic Exercises, PT Neuro Re-Ed/Balance, PT Aquatic Therapy, PT Therapeutic Activity, PT Manual Therapy, and PT Evaluation                  Problem: Mobility Transfers     Dates: Start: 07/28/20       Goal: STG-Within one week, patient will transfer bed to chair     Dates: Start: 07/28/20       Description: 1) Individualized goal:  Patient will transfer CGA SPT with FWW, SBA bed mob   2) Interventions:  PT Gait Training, PT Self Care/Home Eval, PT Therapeutic Exercises, PT Neuro Re-Ed/Balance, PT Aquatic Therapy, PT Therapeutic Activity, PT Manual Therapy, and PT Evaluation                  Problem: PT-Long Term Goals     Dates: Start: 07/28/20       Goal: LTG-By discharge, patient will ambulate     Dates: Start: 07/28/20       Description: 1) Individualized goal:  Patient will amb >50 ft indoors mod I with FWW  2) Interventions:  PT Gait Training, PT Self Care/Home Eval, PT Therapeutic Exercises, PT Neuro Re-Ed/Balance, PT Aquatic Therapy, PT Therapeutic Activity, PT Manual Therapy, and PT Evaluation            Goal: LTG-By discharge, patient will transfer one surface to another     Dates: Start: 07/28/20       Description: 1) Individualized goal: Patient will transfer Mod I with FWW STS/SPT   2) Interventions:  PT Gait Training, PT " Self Care/Home Eval, PT Therapeutic Exercises, PT Neuro Re-Ed/Balance, PT Aquatic Therapy, PT Therapeutic Activity, PT Manual Therapy, and PT Evaluation            Goal: LTG-By discharge, patient will ambulate up/down 4-6 stairs     Dates: Start: 07/28/20       Description: 1) Individualized goal:  Patient will amb up/down 2 standard stairs with no railing CGA  2) Interventions:  PT Gait Training, PT Self Care/Home Eval, PT Therapeutic Exercises, PT Neuro Re-Ed/Balance, PT Aquatic Therapy, PT Therapeutic Activity, PT Manual Therapy, and PT Evaluation

## 2020-08-03 NOTE — THERAPY
Occupational Therapy  Daily Treatment     Patient Name: Richard Hubbard II  Age:  45 y.o., Sex:  male  Medical Record #: [id number]  Today's Date: 8/3/2020     Precautions  Precautions: (P) Fall Risk, Weight Bearing As Tolerated Right Lower Extremity  Comments: (P) No pelvic precautions, ok for stretching/ROM and strengthening    Safety   ADL Safety : Requires Supervision for Safety  Bathroom Safety: Requires Supervision for Safety  Comments: see below notes for ADL performance details.    Subjective    Pt agreeable to OT     Objective       08/03/20 1031   Precautions   Precautions Fall Risk;Weight Bearing As Tolerated Right Lower Extremity   Comments No pelvic precautions, ok for stretching/ROM and strengthening   Hand Strengthening   Comment Pt issued tendon gliding HEP for R hand, able to return demo appropriately, completed 5 repetitions with 5 second holds, requires assist from L hand for R 5th digit positioning during straight fist only   OT Total Time Spent   OT Individual Total Time Spent (Mins) 60   OT Charge Group   OT Therapy Activity 4     Sit to stands 3x5 from elevated mat, focus on standing first before seeking UE support from FWW  Side stepping in front of mat R/L blocked practice to simulate bathroom entry  Time spent discussing home bathroom setup and strategies for access, pt believes he can side step through doorway, cannot fit FWW into toilet room but is short distance, discussing use of hand rails for increased UE support which pt was agreeable to, would also like to purchase suction cup GB for shower      Assessment    Pt tolerated session well, continues to be limited by R knee pain but managing well, improved tolerance and stability during sit to stands with reduction of UE support.   Strengths: Able to follow instructions, Alert and oriented, Independent prior level of function, Effective communication skills, Pleasant and cooperative, Supportive family  Barriers: Fatigue, Decreased  endurance, Generalized weakness, Impaired activity tolerance, Impaired balance, Pain    Plan    ADL/IADLs and related functional mobility, AE training, R /pinch strengthening, standing tolerance/balance.     Occupational Therapy Goals     Problem: Dressing     Dates: Start: 07/28/20       Goal: STG-Within one week, patient will dress LB     Dates: Start: 07/28/20       Description: 1) Individualized Goal:  Min assist with AE as needed  2) Interventions:  OT Self Care/ADL, OT Manual Ther Technique, OT Neuro Re-Ed/Balance, OT Sensory Int Techniques, OT Therapeutic Activity, OT Evaluation, and OT Therapeutic Exercise      Note:     Goal Note filed on 07/30/20 1102 by Wojciech Clements, C.O.T.A.    Patient Min assist to don underwear and pants     Requires mod assist for right sock and shoe   Limited by decreased strength/ endurance  limited ROM  right knee                         Problem: Functional Transfers     Dates: Start: 07/28/20       Description: 1) Individualized Goal:  CGA    2) Interventions:  OT Self Care/ADL, OT Manual Ther Technique, OT Neuro Re-Ed/Balance, OT Sensory Int Techniques, OT Therapeutic Activity, OT Evaluation, and OT Therapeutic Exercise    Goal: STG-Within one week, patient will transfer to toilet     Dates: Start: 07/28/20       Description: 1) Individualized Goal:  CGA    2) Interventions:  OT Self Care/ADL, OT Manual Ther Technique, OT Neuro Re-Ed/Balance, OT Sensory Int Techniques, OT Therapeutic Activity, OT Evaluation, and OT Therapeutic Exercise    Note:     Goal Note filed on 07/30/20 1102 by Wojciech Clements, C.O.T.A.    Not yet observed                           Problem: IADL's     Dates: Start: 07/28/20       Goal: STG-Within one week, patient will access kitchen area     Dates: Start: 07/28/20       Description: 1) Individualized Goal:  Min assist with FWW    2) Interventions:  OT Self Care/ADL, OT Manual Ther Technique, OT Neuro Re-Ed/Balance, OT Sensory Int Techniques, OT  Therapeutic Activity, OT Evaluation, and OT Therapeutic Exercise    Note:     Goal Note filed on 07/30/20 1102 by Wojciech Clements C.O.T.A.    Not yet addressed                            Problem: OT Long Term Goals     Dates: Start: 07/28/20       Goal: LTG-By discharge, patient will complete basic self care tasks     Dates: Start: 07/28/20       Description: 1) Individualized Goal:  Mod I with AE as needed    2) Interventions:  OT Self Care/ADL, OT Manual Ther Technique, OT Neuro Re-Ed/Balance, OT Sensory Int Techniques, OT Therapeutic Activity, OT Evaluation, and OT Therapeutic Exercise          Goal: LTG-By discharge, patient will perform bathroom transfers     Dates: Start: 07/28/20       Description: 1) Individualized Goal:  Mod I   2) Interventions:  OT Self Care/ADL, OT Manual Ther Technique, OT Neuro Re-Ed/Balance, OT Sensory Int Techniques, OT Therapeutic Activity, OT Evaluation, and OT Therapeutic Exercise

## 2020-08-03 NOTE — CARE PLAN
Problem: Safety  Goal: Will remain free from falls  Intervention: Implement fall precautions  Flowsheets (Taken 8/3/2020 7906)  Bed Alarm: Alarm Not On  Environmental Precautions:   Treaded Slipper Socks on Patient   Personal Belongings, Wastebasket, Call Bell etc. in Easy Reach   Bed in Low Position     Problem: Pain Management  Goal: Pain level will decrease to patient's comfort goal  Description: Pt able to participate in therapies and activities this shift.   Intervention: Educate and implement non-pharmacologic comfort measures. Examples: relaxation, distration, play therapy, activity therapy, massage, etc.  Note: Right knee pain managed with MS pRN AND atc with relief [ see mar] Cont monitored.

## 2020-08-03 NOTE — CARE PLAN
Problem: Bowel/Gastric:  Goal: Normal bowel function is maintained or improved  Outcome: PROGRESSING AS EXPECTED  Note: PRN milk of magnesia given for constipation. Last BM 8/2/20       Problem: Pain Management  Goal: Pain level will decrease to patient's comfort goal  Outcome: PROGRESSING AS EXPECTED  Note: Pt is on scheduled pain medications. Pt is also getting PRN tramadol and morphine IR for R pelvic and knee pain.

## 2020-08-03 NOTE — THERAPY
Recreational Therapy  Daily Treatment     Patient Name: Richard Hubbard II  AGE:  45 y.o., SEX:  male  Medical Record #: [id number]  Today's Date: 8/3/2020       Subjective    Pt speaking on how he is having R LE pain and swelling.      Objective       08/03/20 1401   Functional Ability Status - Cognitive   Attention Span Remains on Task   Comprehension Follows Three Step Commands   Judgment Able to Make Independent Decisions   Functional Ability Status - Emotional    Affect Appropriate   Mood Appropriate   Behavior Appropriate   Skilled Intervention    Skilled Intervention Gross Motor Leisure;Cognitive Leisure;Relaxation / Coping Skills   Skilled Intervention Comments Sloan valentin   Interdisciplinary Plan of Care Collaboration   IDT Collaboration with  Nursing   Patient Position at End of Therapy Seated   Collaboration Comments Nursing in room at end of the session.    Strengths & Barriers   Strengths Able to follow instructions;Good carryover of learning;Motivated for self care and independence;Pleasant and cooperative;Supportive family;Willingly participates in therapeutic activities   Barriers Generalized weakness;Impaired activity tolerance;Impaired carryover of learning;Limited mobility   Treatment Time   Total Time Spent (mins) 30   Procedural Tracking   Procedural Tracking Cognitive Skills Training       Assessment    Pt was able to share that he was able to have fun and enjoyed the positive leisure activity.     Strengths: (P) Able to follow instructions, Good carryover of learning, Motivated for self care and independence, Pleasant and cooperative, Supportive family, Willingly participates in therapeutic activities  Barriers: (P) Generalized weakness, Impaired activity tolerance, Impaired carryover of learning, Limited mobility    Plan    Continue positive leisure.

## 2020-08-04 ENCOUNTER — TELEPHONE (OUTPATIENT)
Dept: PHYSICAL MEDICINE AND REHAB | Facility: MEDICAL CENTER | Age: 45
End: 2020-08-04

## 2020-08-04 LAB
BASOPHILS # BLD AUTO: 1 % (ref 0–1.8)
BASOPHILS # BLD: 0.06 K/UL (ref 0–0.12)
EOSINOPHIL # BLD AUTO: 0.17 K/UL (ref 0–0.51)
EOSINOPHIL NFR BLD: 2.7 % (ref 0–6.9)
ERYTHROCYTE [DISTWIDTH] IN BLOOD BY AUTOMATED COUNT: 51.1 FL (ref 35.9–50)
HCT VFR BLD AUTO: 31.7 % (ref 42–52)
HGB BLD-MCNC: 9.9 G/DL (ref 14–18)
IMM GRANULOCYTES # BLD AUTO: 0.07 K/UL (ref 0–0.11)
IMM GRANULOCYTES NFR BLD AUTO: 1.1 % (ref 0–0.9)
LYMPHOCYTES # BLD AUTO: 1.06 K/UL (ref 1–4.8)
LYMPHOCYTES NFR BLD: 17 % (ref 22–41)
MCH RBC QN AUTO: 29.3 PG (ref 27–33)
MCHC RBC AUTO-ENTMCNC: 31.2 G/DL (ref 33.7–35.3)
MCV RBC AUTO: 93.8 FL (ref 81.4–97.8)
MONOCYTES # BLD AUTO: 0.56 K/UL (ref 0–0.85)
MONOCYTES NFR BLD AUTO: 9 % (ref 0–13.4)
NEUTROPHILS # BLD AUTO: 4.33 K/UL (ref 1.82–7.42)
NEUTROPHILS NFR BLD: 69.2 % (ref 44–72)
NRBC # BLD AUTO: 0 K/UL
NRBC BLD-RTO: 0 /100 WBC
PLATELET # BLD AUTO: 397 K/UL (ref 164–446)
PMV BLD AUTO: 8.7 FL (ref 9–12.9)
RBC # BLD AUTO: 3.38 M/UL (ref 4.7–6.1)
WBC # BLD AUTO: 6.3 K/UL (ref 4.8–10.8)

## 2020-08-04 PROCEDURE — 97110 THERAPEUTIC EXERCISES: CPT

## 2020-08-04 PROCEDURE — 97140 MANUAL THERAPY 1/> REGIONS: CPT | Mod: CO

## 2020-08-04 PROCEDURE — 36415 COLL VENOUS BLD VENIPUNCTURE: CPT

## 2020-08-04 PROCEDURE — 770010 HCHG ROOM/CARE - REHAB SEMI PRIVAT*

## 2020-08-04 PROCEDURE — 97530 THERAPEUTIC ACTIVITIES: CPT

## 2020-08-04 PROCEDURE — 85025 COMPLETE CBC W/AUTO DIFF WBC: CPT

## 2020-08-04 PROCEDURE — 97116 GAIT TRAINING THERAPY: CPT

## 2020-08-04 PROCEDURE — 97535 SELF CARE MNGMENT TRAINING: CPT

## 2020-08-04 PROCEDURE — A9270 NON-COVERED ITEM OR SERVICE: HCPCS | Performed by: PHYSICAL MEDICINE & REHABILITATION

## 2020-08-04 PROCEDURE — 700102 HCHG RX REV CODE 250 W/ 637 OVERRIDE(OP): Performed by: PHYSICAL MEDICINE & REHABILITATION

## 2020-08-04 PROCEDURE — 99232 SBSQ HOSP IP/OBS MODERATE 35: CPT | Performed by: PHYSICAL MEDICINE & REHABILITATION

## 2020-08-04 RX ADMIN — POLYETHYLENE GLYCOL 3350 1 PACKET: 17 POWDER, FOR SOLUTION ORAL at 09:00

## 2020-08-04 RX ADMIN — PREGABALIN 150 MG: 150 CAPSULE ORAL at 21:12

## 2020-08-04 RX ADMIN — FERROUS SULFATE TAB 325 MG (65 MG ELEMENTAL FE) 325 MG: 325 (65 FE) TAB at 16:54

## 2020-08-04 RX ADMIN — MORPHINE SULFATE 15 MG: 15 TABLET ORAL at 08:14

## 2020-08-04 RX ADMIN — MAGNESIUM HYDROXIDE 30 ML: 400 SUSPENSION ORAL at 16:10

## 2020-08-04 RX ADMIN — MORPHINE SULFATE 15 MG: 15 TABLET ORAL at 13:27

## 2020-08-04 RX ADMIN — MORPHINE SULFATE 15 MG: 15 TABLET, FILM COATED, EXTENDED RELEASE ORAL at 21:12

## 2020-08-04 RX ADMIN — NICOTINE 7 MG: 7 PATCH, EXTENDED RELEASE TRANSDERMAL at 16:56

## 2020-08-04 RX ADMIN — Medication 500 MG: at 16:54

## 2020-08-04 RX ADMIN — LOSARTAN POTASSIUM 25 MG: 25 TABLET, FILM COATED ORAL at 08:14

## 2020-08-04 RX ADMIN — PREGABALIN 150 MG: 150 CAPSULE ORAL at 08:14

## 2020-08-04 RX ADMIN — MORPHINE SULFATE 15 MG: 15 TABLET ORAL at 04:25

## 2020-08-04 RX ADMIN — PREGABALIN 150 MG: 150 CAPSULE ORAL at 15:35

## 2020-08-04 RX ADMIN — ZOLPIDEM TARTRATE 5 MG: 5 TABLET ORAL at 23:12

## 2020-08-04 RX ADMIN — TRAMADOL HYDROCHLORIDE 50 MG: 50 TABLET, COATED ORAL at 06:44

## 2020-08-04 RX ADMIN — DOCUSATE SODIUM 50 MG AND SENNOSIDES 8.6 MG 2 TABLET: 8.6; 5 TABLET, FILM COATED ORAL at 08:17

## 2020-08-04 RX ADMIN — MORPHINE SULFATE 15 MG: 15 TABLET, FILM COATED, EXTENDED RELEASE ORAL at 08:14

## 2020-08-04 RX ADMIN — Medication 2000 UNITS: at 08:14

## 2020-08-04 RX ADMIN — MORPHINE SULFATE 15 MG: 15 TABLET ORAL at 21:12

## 2020-08-04 RX ADMIN — TRAMADOL HYDROCHLORIDE 50 MG: 50 TABLET, COATED ORAL at 11:15

## 2020-08-04 RX ADMIN — MORPHINE SULFATE 15 MG: 15 TABLET ORAL at 19:42

## 2020-08-04 RX ADMIN — TRAMADOL HYDROCHLORIDE 50 MG: 50 TABLET, COATED ORAL at 22:48

## 2020-08-04 RX ADMIN — Medication 500 MG: at 08:14

## 2020-08-04 RX ADMIN — ASPIRIN 81 MG: 81 TABLET, COATED ORAL at 08:14

## 2020-08-04 RX ADMIN — TRAMADOL HYDROCHLORIDE 50 MG: 50 TABLET, COATED ORAL at 16:09

## 2020-08-04 RX ADMIN — MORPHINE SULFATE 15 MG: 15 TABLET ORAL at 01:32

## 2020-08-04 RX ADMIN — OMEPRAZOLE 20 MG: 20 CAPSULE, DELAYED RELEASE ORAL at 08:14

## 2020-08-04 RX ADMIN — MORPHINE SULFATE 15 MG: 15 TABLET ORAL at 13:26

## 2020-08-04 RX ADMIN — DOCUSATE SODIUM 50 MG AND SENNOSIDES 8.6 MG 2 TABLET: 8.6; 5 TABLET, FILM COATED ORAL at 21:12

## 2020-08-04 RX ADMIN — FERROUS SULFATE TAB 325 MG (65 MG ELEMENTAL FE) 325 MG: 325 (65 FE) TAB at 08:14

## 2020-08-04 RX ADMIN — MORPHINE SULFATE 15 MG: 15 TABLET ORAL at 07:32

## 2020-08-04 ASSESSMENT — GAIT ASSESSMENTS
DEVIATION: ANTALGIC;BRADYKINETIC;DECREASED HEEL STRIKE;DECREASED TOE OFF
GAIT LEVEL OF ASSIST: SUPERVISED
ASSISTIVE DEVICE: FRONT WHEEL WALKER
DISTANCE (FEET): 100
GAIT LEVEL OF ASSIST: SUPERVISED
DEVIATION: ANTALGIC;BRADYKINETIC;DECREASED HEEL STRIKE;DECREASED TOE OFF
DISTANCE (FEET): 50
ASSISTIVE DEVICE: FRONT WHEEL WALKER

## 2020-08-04 ASSESSMENT — ACTIVITIES OF DAILY LIVING (ADL)
TOILETING_LEVEL_OF_ASSIST_DESCRIPTION: INCREASED TIME;GRAB BAR
BED_CHAIR_WHEELCHAIR_TRANSFER_DESCRIPTION: ADAPTIVE EQUIPMENT;SUPERVISION FOR SAFETY
BED_CHAIR_WHEELCHAIR_TRANSFER_DESCRIPTION: ADAPTIVE EQUIPMENT;SUPERVISION FOR SAFETY;VERBAL CUEING
TUB_SHOWER_TRANSFER_DESCRIPTION: GRAB BAR;SHOWER BENCH;SUPERVISION FOR SAFETY;VERBAL CUEING
TOILET_TRANSFER_DESCRIPTION: GRAB BAR;VERBAL CUEING;SUPERVISION FOR SAFETY;INCREASED TIME

## 2020-08-04 NOTE — PROGRESS NOTES
"Rehab Progress Note     Encounter Date: 8/4/2020    CC: right knee pain, sacral pain, chronic pain    Interval Events (Subjective)  Patient sitting up in room. He reports he feels like he could do better with a few more days at [facility]. He reports he is motivated to get his therapy and push his ROM. He reports last time with HH they did not know how to do much with him due to pain. Discussed possible trial of a different home health agency. He would thinks he would benefit from a few more days. Discussed would need to remain motivated to increase ROM, mobility and transfer. Discussed with team and request extension to 8/8/20. Pain is currently well controlled. He has an appointment with PM&R pain this week, discussed he will have to reschedule for next week. Discussed about trial without long acting morphine, he reports he is trying to use less of the short acting first. Ongoing constipation.     IDT Team Meeting 7/30/2020  DC/Disposition:  8/6/20 -> 8/8/20?    Objective:  VITAL SIGNS: /71   Pulse 82   Temp 36.6 °C (97.8 °F) (Temporal)   Resp 18   Ht 1.803 m (5' 11\")   Wt 101.6 kg (224 lb)   SpO2 92%   BMI 31.24 kg/m²   Gen: NAD  Psych: Mood and affect appropriate  CV: RRR, no edema  Resp: CTAB, no upper airway sounds  Abd: NTND  Neuro: AOx4, following simple commands  MSK: right knee swollen lateral > medial   Unchanged from 8/3/20    Recent Results (from the past 72 hour(s))   CBC WITH DIFFERENTIAL    Collection Time: 08/04/20  9:49 AM   Result Value Ref Range    WBC 6.3 4.8 - 10.8 K/uL    RBC 3.38 (L) 4.70 - 6.10 M/uL    Hemoglobin 9.9 (L) 14.0 - 18.0 g/dL    Hematocrit 31.7 (L) 42.0 - 52.0 %    MCV 93.8 81.4 - 97.8 fL    MCH 29.3 27.0 - 33.0 pg    MCHC 31.2 (L) 33.7 - 35.3 g/dL    RDW 51.1 (H) 35.9 - 50.0 fL    Platelet Count 397 164 - 446 K/uL    MPV 8.7 (L) 9.0 - 12.9 fL    Neutrophils-Polys 69.20 44.00 - 72.00 %    Lymphocytes 17.00 (L) 22.00 - 41.00 %    Monocytes 9.00 0.00 - 13.40 %    Eosinophils " 2.70 0.00 - 6.90 %    Basophils 1.00 0.00 - 1.80 %    Immature Granulocytes 1.10 (H) 0.00 - 0.90 %    Nucleated RBC 0.00 /100 WBC    Neutrophils (Absolute) 4.33 1.82 - 7.42 K/uL    Lymphs (Absolute) 1.06 1.00 - 4.80 K/uL    Monos (Absolute) 0.56 0.00 - 0.85 K/uL    Eos (Absolute) 0.17 0.00 - 0.51 K/uL    Baso (Absolute) 0.06 0.00 - 0.12 K/uL    Immature Granulocytes (abs) 0.07 0.00 - 0.11 K/uL    NRBC (Absolute) 0.00 K/uL       Current Facility-Administered Medications   Medication Frequency   • polyethylene glycol/lytes (MIRALAX) PACKET 1 Packet DAILY    And   • senna-docusate (PERICOLACE or SENOKOT S) 8.6-50 MG per tablet 2 Tab BID    And   • magnesium hydroxide (MILK OF MAGNESIA) suspension 30 mL QDAY PRN    And   • bisacodyl (DULCOLAX) suppository 10 mg QDAY PRN   • bacitracin-polymyxin b (POLYSPORIN) 500-24648 UNIT/GM ointment PRN   • carboxymethylcellulose 1 % ophth soln 1 Drop PRN   • morphine ER (MS CONTIN) tablet 15 mg Q12HRS   • ferrous sulfate tablet 325 mg BID WITH MEALS   • ascorbic acid tablet 500 mg BID WITH MEALS   • vitamin D (cholecalciferol) tablet 2,000 Units DAILY   • Respiratory Therapy Consult Continuous RT   • tramadol (ULTRAM) 50 MG tablet 50 mg Q4HRS PRN   • hydrALAZINE (APRESOLINE) tablet 25 mg Q8HRS PRN   • acetaminophen (TYLENOL) tablet 650 mg Q4HRS PRN   • benzocaine-menthol (CEPACOL) lozenge 1 Lozenge Q2HRS PRN   • mag hydrox-al hydrox-simeth (MAALOX PLUS ES or MYLANTA DS) suspension 20 mL Q2HRS PRN   • ondansetron (ZOFRAN ODT) dispertab 4 mg 4X/DAY PRN    Or   • ondansetron (ZOFRAN) syringe/vial injection 4 mg 4X/DAY PRN   • traZODone (DESYREL) tablet 50 mg QHS PRN   • sodium chloride (OCEAN) 0.65 % nasal spray 2 Spray PRN   • zolpidem (AMBIEN) tablet 5 mg HS PRN   • aspirin EC (ECOTRIN) tablet 81 mg DAILY   • losartan (COZAAR) tablet 25 mg DAILY   • morphine (MS IR) tablet 15 mg Q6HRS PRN   • morphine (MS IR) tablet 15 mg Q6HRS   • nicotine (NICODERM) 7 MG/24HR 7 mg Q24HRS   •  omeprazole (PRILOSEC) capsule 20 mg DAILY   • pregabalin (LYRICA) capsule 150 mg TID       Orders Placed This Encounter   Procedures   • Diet Order Regular     Standing Status:   Standing     Number of Occurrences:   1     Order Specific Question:   Diet:     Answer:   Regular [1]       Assessment:  Active Hospital Problems    Diagnosis   • *S/P total knee arthroplasty, right   • Acute blood loss anemia   • Atrial fibrillation with rapid ventricular response (HCC)   • Paroxysmal atrial fibrillation (HCC)   • Pubic ramus fracture (HCC)   • Essential hypertension   • Chronic pain of right knee   • Rheumatoid arthritis (HCC)   • Anxiety disorder       Medical Decision Making and Plan:  R Knee replacement - Patient s/p right knee replacement with Dr. Pires for severe valgus deformity and insufficient MCL on 7/22/20. Post-operative course complicated by severe anemia.  -PT and OT for mobility and ADLs  -R knee immobilizer PRN. WBAT.  Needs to do more ROM at knee      RA - Multiple joint involvement. Previously on Xeljanz      Anemia - Severe post-operative anemia. Check AM CBC 8.6. Fe panel at [facility] with low Iron  -Start Fe supplement BID and Vitamin C. Recheck on 8/4/20    HTN/ Afib -  Patient on Losartan 25 mg daily.  Elevated > 140 x1. Being held due to hold parameters set high. Changed to < 100     GERD - PPI on transfer.      Chronic pain - Patient on Tylenol 1000 mg QID, Morphine 15 mg q6h, Lyrica 150 mg TID  -Continue to have break through pain. MS Contin 15 mg BID to have baseline. Discussed about weaning off at 7-10 days post-operative  -Worsening neuropathic pain, would like to stay at Lyrica 150 mg TID     Tobacco abuse - Patient will need counseling. Currently on Nicotine patch.      Vitamin D deficiency - 19 on admission. Start 2000 U     GI - Opiate induced constipation. Start Miralax daily and Senna-Docusate    DVT Ppx - Patient with severe anemia. Currently on ASA 81 mg    Total time:  26 minutes.  I  spent greater than 50% of the time for patient care, counseling, and coordination on this date, including unit/floor time, and face-to-face time with the patient as per interval events and assessment and plan above. Topics discussed included discharge planning, possible extension, reduce PRN morphine, and ongoing discussion about pain management follow-up and long acting opiate.     Keaton Pisano M.D.

## 2020-08-04 NOTE — DISCHARGE PLANNING
"Met w/ patient at bedside to review targeted DC date 8.6.2020 & DC planning. States \"HH therapy were not helpful when I went home the last time. I'm not sure if outpatient therapy has restarted since Covid in [place], plus I'm immunocompromised & not sure about that setting being the best for me. I don't think I'm ready to go home yet, but I'll see. Today was a pretty bad day.\" Questions answered. Emotional support provided.  "

## 2020-08-04 NOTE — CARE PLAN
Problem: Safety  Goal: Will remain free from injury  Description: Pt uses call light consistently and appropriately. Waits for assistance does not attempt self transfer this shift. Able to verbalize needs.   Outcome: PROGRESSING AS EXPECTED     Problem: Infection  Goal: Will remain free from infection  Outcome: PROGRESSING AS EXPECTED     Problem: Bowel/Gastric:  Goal: Normal bowel function is maintained or improved  Outcome: PROGRESSING AS EXPECTED     Problem: Pain Management  Goal: Pain level will decrease to patient's comfort goal  Description: Pt able to participate in therapies and activities this shift.   Outcome: PROGRESSING AS EXPECTED

## 2020-08-04 NOTE — THERAPY
Recreational Therapy  Daily Treatment     Patient Name: Richard Hubbard II  AGE:  45 y.o., SEX:  male  Medical Record #: [id number]  Today's Date: 8/4/2020       Subjective    Pt was talking about how he has had trouble getting his R shoe on.      Objective       08/04/20 1401   Functional Ability Status - Cognitive   Attention Span Remains on Task   Comprehension Follows Three Step Commands   Judgment Able to Make Independent Decisions   Functional Ability Status - Emotional    Affect Appropriate   Mood Appropriate   Behavior Appropriate   Skilled Intervention    Skilled Intervention Relaxation / Coping Skills   Skilled Intervention Comments [facility] system   Interdisciplinary Plan of Care Collaboration   IDT Collaboration with  Physical Therapist   Patient Position at End of Therapy Seated   Collaboration Comments PT recieved care from this writer.    Strengths & Barriers   Strengths Able to follow instructions;Alert and oriented;Effective communication skills;Good carryover of learning;Good insight into deficits/needs;Making steady progress towards goals;Manages pain appropriately;Motivated for self care and independence;Pleasant and cooperative;Supportive family;Willingly participates in therapeutic activities   Barriers Generalized weakness;Pain;Impaired activity tolerance   Treatment Time   Total Time Spent (mins) 30   Procedural Tracking   Procedural Tracking Gross Motor Functional Leisure Skills       Assessment    Pt was able to participate in a positive coping skill of wii homero.     Strengths: (P) Able to follow instructions, Alert and oriented, Effective communication skills, Good carryover of learning, Good insight into deficits/needs, Making steady progress towards goals, Manages pain appropriately, Motivated for self care and independence, Pleasant and cooperative, Supportive family, Willingly participates in therapeutic activities  Barriers: (P) Generalized weakness, Pain, Impaired activity  tolerance    Plan    Positive coping skills

## 2020-08-04 NOTE — THERAPY
"Physical Therapy   Daily Treatment     Patient Name: Richard Hubbard II  Age:  45 y.o., Sex:  male  Medical Record #: [id number]  Today's Date: 8/4/2020     Precautions  Precautions: (P) Fall Risk, Weight Bearing As Tolerated Right Lower Extremity  Comments: (P) No pelvic precautions, ok for stretching/ROM and strengthening    Subjective    Patient seated in bed and agreeable to therapy.    \"Why didn't they tell me that I was going to have therapy this morning? They should give out the schedules the night before.\"     Objective       08/04/20 0731   Precautions   Precautions Fall Risk;Weight Bearing As Tolerated Right Lower Extremity   Comments No pelvic precautions, ok for stretching/ROM and strengthening   Gait Functional Level of Assist    Gait Level Of Assist Supervised   Assistive Device Front Wheel Walker   Distance (Feet) 100   # of Times Distance was Traveled 2   Deviation Antalgic;Bradykinetic;Decreased Heel Strike;Decreased Toe Off   Stairs Functional Level of Assist   Level of Assist with Stairs Contact Guard Assist   # of Stairs Climbed 4   Stairs Description Extra time;Hand rails;Requires incidental assist;Safety concerns;Verbal cueing  (LLE on ascent; RLE on descent. Also able to side step)   Transfer Functional Level of Assist   Bed, Chair, Wheelchair Transfer Supervised   Bed Chair Wheelchair Transfer Description Adaptive equipment;Supervision for safety   Bed Mobility    Supine to Sit Supervised   Sit to Stand Supervised   Scooting Supervised   Rolling Supervised   Interdisciplinary Plan of Care Collaboration   Patient Position at End of Therapy In Bed;Seated;Call Light within Reach;Tray Table within Reach;Phone within Reach   PT Total Time Spent   PT Individual Total Time Spent (Mins) 30   PT Charge Group   PT Gait Training 2     Side stepping with R rail and CGA x2.    Assessment    Patient tolerated session well, irritable regarding scheduling this morning. Able to trial 6\" steps with good " "performance. Will continue to problem solve home entry as patient does not have rails and no ability to place grab bars.    Strengths: Supportive family, Motivated for self care and independence  Barriers: Limited mobility, Impaired balance, Impaired activity tolerance, Pain, Pain poorly managed, Decreased endurance, Fatigue    Plan    Gait training with FWW, transfer training reducing R knee ROM compensations, BLE strengthening, ROM and stretching (knee flexion and extension), standing and activity tolerance, stair/curb negotiation, standing balance.    Physical Therapy Problems     Problem: Mobility     Dates: Start: 07/28/20       Goal: STG-Within one week, patient will ascend and descend four to six stairs     Dates: Start: 07/28/20       Description: 1) Individualized goal:  Patient will amb up/down 2\" // bars 2x CGA.   2) Interventions:  PT Gait Training, PT Self Care/Home Eval, PT Therapeutic Exercises, PT Neuro Re-Ed/Balance, PT Aquatic Therapy, PT Therapeutic Activity, PT Manual Therapy, and PT Evaluation                  Problem: Mobility Transfers     Dates: Start: 07/28/20       Goal: STG-Within one week, patient will transfer bed to chair     Dates: Start: 07/28/20       Description: 1) Individualized goal:  Patient will transfer CGA SPT with FWW, SBA bed mob   2) Interventions:  PT Gait Training, PT Self Care/Home Eval, PT Therapeutic Exercises, PT Neuro Re-Ed/Balance, PT Aquatic Therapy, PT Therapeutic Activity, PT Manual Therapy, and PT Evaluation                  Problem: PT-Long Term Goals     Dates: Start: 07/28/20       Goal: LTG-By discharge, patient will ambulate     Dates: Start: 07/28/20       Description: 1) Individualized goal:  Patient will amb >50 ft indoors mod I with FWW  2) Interventions:  PT Gait Training, PT Self Care/Home Eval, PT Therapeutic Exercises, PT Neuro Re-Ed/Balance, PT Aquatic Therapy, PT Therapeutic Activity, PT Manual Therapy, and PT Evaluation            Goal: LTG-By " discharge, patient will transfer one surface to another     Dates: Start: 07/28/20       Description: 1) Individualized goal: Patient will transfer Mod I with FWW STS/SPT   2) Interventions:  PT Gait Training, PT Self Care/Home Eval, PT Therapeutic Exercises, PT Neuro Re-Ed/Balance, PT Aquatic Therapy, PT Therapeutic Activity, PT Manual Therapy, and PT Evaluation            Goal: LTG-By discharge, patient will ambulate up/down 4-6 stairs     Dates: Start: 07/28/20       Description: 1) Individualized goal:  Patient will amb up/down 2 standard stairs with no railing CGA  2) Interventions:  PT Gait Training, PT Self Care/Home Eval, PT Therapeutic Exercises, PT Neuro Re-Ed/Balance, PT Aquatic Therapy, PT Therapeutic Activity, PT Manual Therapy, and PT Evaluation

## 2020-08-04 NOTE — TELEPHONE ENCOUNTER
Leonor the  at Rehab said that this patient was admitted. This patient still wants to see  Virtually while he is in the hospital for his med refill. He would like a call back him know if this is ok. The  would also like a call at [phone]. Please advise

## 2020-08-04 NOTE — THERAPY
Occupational Therapy  Daily Treatment     Patient Name: Richard Hubbard II  Age:  45 y.o., Sex:  male  Medical Record #: [id number]  Today's Date: 8/4/2020     Precautions  Precautions: Fall Risk, Weight Bearing As Tolerated Right Lower Extremity  Comments: (P) No pelvic precautions, ok for stretching/ROM and strengthening    Safety   ADL Safety : Requires Supervision for Safety  Bathroom Safety: Requires Supervision for Safety  Comments: see below notes for ADL performance details.    Subjective    Pt received seated edge of bed, had clothing setup for shower     Objective       08/04/20 0831   Precautions   Precautions Fall Risk;Weight Bearing As Tolerated Right Lower Extremity   Comments No pelvic precautions, ok for stretching/ROM and strengthening   Functional Level of Assist   Grooming Modified Independent   Grooming Description Increased time   Bathing Supervision   Bathing Description Supervision for safety;Verbal cueing;Grab bar  (SBA for standing)   Upper Body Dressing Independent   Lower Body Dressing Stand by Assist   Lower Body Dressing Description Shoe horn;Sock aid;Reacher;Dressing stick;Supervision for safety;Increased time  (SBA for standing components, increased time)   Toileting Stand by Assist   Toileting Description Increased time;Grab bar   Toilet Transfers Stand by Assist   Toilet Transfer Description Grab bar;Verbal cueing;Supervision for safety;Increased time   Tub / Shower Transfers Stand by Assist   Tub Shower Transfer Description Grab bar;Shower bench;Supervision for safety;Verbal cueing   OT Total Time Spent   OT Individual Total Time Spent (Mins) 60   OT Charge Group   OT Self Care / ADL 4       Assessment  Pt tolerated session well, demos good self initiative for managing ADLs without assist, appropriate incorporation and setup of AE as needed, requires SBA for standing components for safety and for mobility in/out, continues to be limited by pain, requires increased time to  complete tasks.     Strengths: Able to follow instructions, Alert and oriented, Independent prior level of function, Effective communication skills, Pleasant and cooperative, Supportive family  Barriers: Fatigue, Decreased endurance, Generalized weakness, Impaired activity tolerance, Impaired balance, Pain    Plan    ADL/IADLs and related functional mobility, AE training, R /pinch strengthening, standing tolerance/balance.  continue MLD and  Kinesiotape to assist with  Edema management     Occupational Therapy Goals     Problem: Dressing     Dates: Start: 07/28/20       Goal: STG-Within one week, patient will dress LB     Dates: Start: 07/28/20       Description: 1) Individualized Goal:  Min assist with AE as needed  2) Interventions:  OT Self Care/ADL, OT Manual Ther Technique, OT Neuro Re-Ed/Balance, OT Sensory Int Techniques, OT Therapeutic Activity, OT Evaluation, and OT Therapeutic Exercise      Note:     Goal Note filed on 07/30/20 1102 by Wojciech Clements, C.O.T.A.    Patient Min assist to don underwear and pants     Requires mod assist for right sock and shoe   Limited by decreased strength/ endurance  limited ROM  right knee                         Problem: Functional Transfers     Dates: Start: 07/28/20       Description: 1) Individualized Goal:  CGA    2) Interventions:  OT Self Care/ADL, OT Manual Ther Technique, OT Neuro Re-Ed/Balance, OT Sensory Int Techniques, OT Therapeutic Activity, OT Evaluation, and OT Therapeutic Exercise    Goal: STG-Within one week, patient will transfer to toilet     Dates: Start: 07/28/20       Description: 1) Individualized Goal:  CGA    2) Interventions:  OT Self Care/ADL, OT Manual Ther Technique, OT Neuro Re-Ed/Balance, OT Sensory Int Techniques, OT Therapeutic Activity, OT Evaluation, and OT Therapeutic Exercise    Note:     Goal Note filed on 07/30/20 1102 by Wojciech Clements, C.O.T.A.    Not yet observed                           Problem: IADL's     Dates: Start:  07/28/20       Goal: STG-Within one week, patient will access kitchen area     Dates: Start: 07/28/20       Description: 1) Individualized Goal:  Min assist with FWW    2) Interventions:  OT Self Care/ADL, OT Manual Ther Technique, OT Neuro Re-Ed/Balance, OT Sensory Int Techniques, OT Therapeutic Activity, OT Evaluation, and OT Therapeutic Exercise    Note:     Goal Note filed on 07/30/20 1102 by Wojciech Clements C.O.T.A.    Not yet addressed                            Problem: OT Long Term Goals     Dates: Start: 07/28/20       Goal: LTG-By discharge, patient will complete basic self care tasks     Dates: Start: 07/28/20       Description: 1) Individualized Goal:  Mod I with AE as needed    2) Interventions:  OT Self Care/ADL, OT Manual Ther Technique, OT Neuro Re-Ed/Balance, OT Sensory Int Techniques, OT Therapeutic Activity, OT Evaluation, and OT Therapeutic Exercise          Goal: LTG-By discharge, patient will perform bathroom transfers     Dates: Start: 07/28/20       Description: 1) Individualized Goal:  Mod I   2) Interventions:  OT Self Care/ADL, OT Manual Ther Technique, OT Neuro Re-Ed/Balance, OT Sensory Int Techniques, OT Therapeutic Activity, OT Evaluation, and OT Therapeutic Exercise

## 2020-08-04 NOTE — PROGRESS NOTES
"Rehab Progress Note     Encounter Date: 8/3/2020    CC: right knee pain, sacral pain, chronic pain    Interval Events (Subjective)  Patient sitting up in room. He reports he is doing OK. He continues to have right leg pain which sounds neuropathic.  Discussed that may have been some compression injury due to the severity of his swelling. He reports he understands. He otherwise reports worsened pain and worsened constipation. Discussed about the amount of pain medications.  He is going to try MoM today. Discussed maybe scheduling Miralax daily.     IDT Team Meeting 7/30/2020  DC/Disposition:  8/6/20    Objective:  VITAL SIGNS: /77   Pulse 80   Temp 36.7 °C (98.1 °F) (Oral)   Resp 18   Ht 1.803 m (5' 11\")   Wt 101.6 kg (224 lb)   SpO2 94%   BMI 31.24 kg/m²   Gen: NAD  Psych: Mood and affect appropriate  CV: RRR, no edema  Resp: CTAB, no upper airway sounds  Abd: NTND  Neuro: AOx4, following simple commands  MSK: right knee swollen lateral > medial     No results found for this or any previous visit (from the past 72 hour(s)).    Current Facility-Administered Medications   Medication Frequency   • bacitracin-polymyxin b (POLYSPORIN) 500-36036 UNIT/GM ointment PRN   • carboxymethylcellulose 1 % ophth soln 1 Drop PRN   • morphine ER (MS CONTIN) tablet 15 mg Q12HRS   • ferrous sulfate tablet 325 mg BID WITH MEALS   • ascorbic acid tablet 500 mg BID WITH MEALS   • vitamin D (cholecalciferol) tablet 2,000 Units DAILY   • Respiratory Therapy Consult Continuous RT   • tramadol (ULTRAM) 50 MG tablet 50 mg Q4HRS PRN   • hydrALAZINE (APRESOLINE) tablet 25 mg Q8HRS PRN   • acetaminophen (TYLENOL) tablet 650 mg Q4HRS PRN   • senna-docusate (PERICOLACE or SENOKOT S) 8.6-50 MG per tablet 2 Tab BID    And   • polyethylene glycol/lytes (MIRALAX) PACKET 1 Packet QDAY PRN    And   • magnesium hydroxide (MILK OF MAGNESIA) suspension 30 mL QDAY PRN    And   • bisacodyl (DULCOLAX) suppository 10 mg QDAY PRN   • " benzocaine-menthol (CEPACOL) lozenge 1 Lozenge Q2HRS PRN   • mag hydrox-al hydrox-simeth (MAALOX PLUS ES or MYLANTA DS) suspension 20 mL Q2HRS PRN   • ondansetron (ZOFRAN ODT) dispertab 4 mg 4X/DAY PRN    Or   • ondansetron (ZOFRAN) syringe/vial injection 4 mg 4X/DAY PRN   • traZODone (DESYREL) tablet 50 mg QHS PRN   • sodium chloride (OCEAN) 0.65 % nasal spray 2 Spray PRN   • zolpidem (AMBIEN) tablet 5 mg HS PRN   • aspirin EC (ECOTRIN) tablet 81 mg DAILY   • losartan (COZAAR) tablet 25 mg DAILY   • morphine (MS IR) tablet 15 mg Q6HRS PRN   • morphine (MS IR) tablet 15 mg Q6HRS   • nicotine (NICODERM) 7 MG/24HR 7 mg Q24HRS   • omeprazole (PRILOSEC) capsule 20 mg DAILY   • pregabalin (LYRICA) capsule 150 mg TID       Orders Placed This Encounter   Procedures   • Diet Order Regular     Standing Status:   Standing     Number of Occurrences:   1     Order Specific Question:   Diet:     Answer:   Regular [1]       Assessment:  Active Hospital Problems    Diagnosis   • *S/P total knee arthroplasty, right   • Acute blood loss anemia   • Atrial fibrillation with rapid ventricular response (HCC)   • Paroxysmal atrial fibrillation (HCC)   • Pubic ramus fracture (HCC)   • Essential hypertension   • Chronic pain of right knee   • Rheumatoid arthritis (HCC)   • Anxiety disorder       Medical Decision Making and Plan:  R Knee replacement - Patient s/p right knee replacement with Dr. Pires for severe valgus deformity and insufficient MCL on 7/22/20. Post-operative course complicated by severe anemia.  -PT and OT for mobility and ADLs  -R knee immobilizer PRN. WBAT.  Needs to do more ROM at knee      RA - Multiple joint involvement. Previously on Xeljanz      Anemia - Severe post-operative anemia. Check AM CBC 8.6. Fe panel at [facility] with low Iron  -Start Fe supplement BID and Vitamin C. Recheck on 8/4/20    HTN/ Afib -  Patient on Losartan 25 mg daily.  Elevated > 140 x1. Being held due to hold parameters set high. Changed to <  100     GERD - PPI on transfer.      Chronic pain - Patient on Tylenol 1000 mg QID, Morphine 15 mg q6h, Lyrica 150 mg TID  -Continue to have break through pain. MS Contin 15 mg BID to have baseline. Discussed about weaning off at 7-10 days post-operative  -Worsening neuropathic pain, would like to stay at Lyrica 150 mg TID     Tobacco abuse - Patient will need counseling. Currently on Nicotine patch.      Vitamin D deficiency - 19 on admission. Start 2000 U     GI - Opiate induced constipation. Start Miralax daily and Senna-Docusate    DVT Ppx - Patient with severe anemia. Currently on ASA 81 mg    Total time:  26 minutes.  I spent greater than 50% of the time for patient care, counseling, and coordination on this date, including unit/floor time, and face-to-face time with the patient as per interval events and assessment and plan above. Topics discussed included knee swelling, losartan being held, constipation, and schedule miralax. May need bowel clean out.     Keaton Pisano M.D.    I had a 5 minute discussion with patient on 8/3/2020 about smoking cessation.  Discussed that smoking is associated with respiratory, cardiovascular, oncologic, and neurologic illnesses.  Patient was provided advice and counseling on different methods of smoking cessation as well as provided supportive listening for psychologic aspect of addiction.

## 2020-08-04 NOTE — THERAPY
Physical Therapy   Daily Treatment     Patient Name: Richard Hubbard II  Age:  45 y.o., Sex:  male  Medical Record #: [id number]  Today's Date: 8/4/2020     Precautions  Precautions: (P) Fall Risk, Weight Bearing As Tolerated Right Lower Extremity  Comments: (P) No pelvic precautions, ok for stretching/ROM and strengthening     Subjective    Patient seated in bed and agreeable to therapy.     Objective       08/04/20 1431   Precautions   Precautions Fall Risk;Weight Bearing As Tolerated Right Lower Extremity   Comments No pelvic precautions, ok for stretching/ROM and strengthening    Gait Functional Level of Assist    Gait Level Of Assist Supervised   Assistive Device Front Wheel Walker   Distance (Feet) 50   # of Times Distance was Traveled 2   Deviation Antalgic;Bradykinetic;Decreased Heel Strike;Decreased Toe Off  (decreased foot clearance)   Transfer Functional Level of Assist   Bed, Chair, Wheelchair Transfer Supervised   Bed Chair Wheelchair Transfer Description Adaptive equipment;Supervision for safety;Verbal cueing   Supine Lower Body Exercise   Bridges 2 sets of 15  (on bolster)   Knee to Chest 2 sets of 15  (on physioball with sheet around thighs for BUEs to assist)   Short Arc Quad 2 sets of 15;Right    Other Exercises RLE hamstring isometric on physioball 2x15   Bed Mobility    Supine to Sit Supervised   Sit to Supine Supervised   Sit to Stand Supervised   Scooting Supervised   Rolling Supervised   Interdisciplinary Plan of Care Collaboration   Patient Position at End of Therapy In Bed;Call Light within Reach;Tray Table within Reach;Phone within Reach;Other (Comments)  (nursing present)   PT Total Time Spent   PT Individual Total Time Spent (Mins) 60   PT Charge Group   PT Gait Training 1   PT Therapeutic Exercise 2   PT Therapeutic Activities 1     Joint mobilizations to RLE knee PA and AP Grade IIII-IV 2x60 seconds and joint distraction 1x60 seconds.    Supine RLE knee flexion stretch in 90/90  "position with contract-relax technique 2x90 seconds  Supine RLE knee extension stretch on bolster with contract-relax technique 2x90 seconds.    Assessment    Patient tolerated session well, continues to be limited by pain requiring self-pacing during sessions however very motivated. Demonstrating slow but steady improvements in knee ROM; in supine 90/90 position able to reach ~80-85 degrees with empty end feel.    Strengths: Supportive family, Motivated for self care and independence  Barriers: Limited mobility, Impaired balance, Impaired activity tolerance, Pain, Pain poorly managed, Decreased endurance, Fatigue    Plan    Gait training with FWW, transfer training reducing R knee ROM compensations, BLE strengthening, ROM and stretching (knee flexion and extension) in all positions, standing and activity tolerance, stair/curb negotiation, standing balance.    Physical Therapy Problems     Problem: Mobility     Dates: Start: 07/28/20       Goal: STG-Within one week, patient will ascend and descend four to six stairs     Dates: Start: 07/28/20       Description: 1) Individualized goal:  Patient will amb up/down 2\" // bars 2x CGA.   2) Interventions:  PT Gait Training, PT Self Care/Home Eval, PT Therapeutic Exercises, PT Neuro Re-Ed/Balance, PT Aquatic Therapy, PT Therapeutic Activity, PT Manual Therapy, and PT Evaluation                  Problem: Mobility Transfers     Dates: Start: 07/28/20       Goal: STG-Within one week, patient will transfer bed to chair     Dates: Start: 07/28/20       Description: 1) Individualized goal:  Patient will transfer CGA SPT with FWW, SBA bed mob   2) Interventions:  PT Gait Training, PT Self Care/Home Eval, PT Therapeutic Exercises, PT Neuro Re-Ed/Balance, PT Aquatic Therapy, PT Therapeutic Activity, PT Manual Therapy, and PT Evaluation                  Problem: PT-Long Term Goals     Dates: Start: 07/28/20       Goal: LTG-By discharge, patient will ambulate     Dates: Start: " 07/28/20       Description: 1) Individualized goal:  Patient will amb >50 ft indoors mod I with FWW  2) Interventions:  PT Gait Training, PT Self Care/Home Eval, PT Therapeutic Exercises, PT Neuro Re-Ed/Balance, PT Aquatic Therapy, PT Therapeutic Activity, PT Manual Therapy, and PT Evaluation            Goal: LTG-By discharge, patient will transfer one surface to another     Dates: Start: 07/28/20       Description: 1) Individualized goal: Patient will transfer Mod I with FWW STS/SPT   2) Interventions:  PT Gait Training, PT Self Care/Home Eval, PT Therapeutic Exercises, PT Neuro Re-Ed/Balance, PT Aquatic Therapy, PT Therapeutic Activity, PT Manual Therapy, and PT Evaluation            Goal: LTG-By discharge, patient will ambulate up/down 4-6 stairs     Dates: Start: 07/28/20       Description: 1) Individualized goal:  Patient will amb up/down 2 standard stairs with no railing CGA  2) Interventions:  PT Gait Training, PT Self Care/Home Eval, PT Therapeutic Exercises, PT Neuro Re-Ed/Balance, PT Aquatic Therapy, PT Therapeutic Activity, PT Manual Therapy, and PT Evaluation

## 2020-08-04 NOTE — THERAPY
"Occupational Therapy  Daily Treatment     Patient Name: Richard Hubbard II  Age:  45 y.o., Sex:  male  Medical Record #: [id number]  Today's Date: 8/4/2020     Precautions  Precautions: (P) Fall Risk, Weight Bearing As Tolerated Right Lower Extremity  Comments: No pelvic precautions, ok for stretching/ROM and strengthening    Safety   ADL Safety : Requires Supervision for Safety  Bathroom Safety: Requires Supervision for Safety  Comments: see below notes for ADL performance details.    Subjective    \" I can't tell the difference .\" regarding  Decreased edema in right LE     Objective       08/04/20 1031   Precautions   Precautions Fall Risk;Weight Bearing As Tolerated Right Lower Extremity   Interdisciplinary Plan of Care Collaboration   Patient Position at End of Therapy In Bed;Call Light within Reach;Tray Table within Reach   OT Total Time Spent   OT Individual Total Time Spent (Mins) 30   OT Charge Group   OT Manual Therapy Technique 2      Manual Lymph Drainage  X  15 and 18 minutes  To right LE   Groin to ankle.   Therapist forgot to measure prior to beginning session    patient reports Kinesiotape on lateral side of knee came off over weekend and on medial side lasted until  Last night.   Attributed  Tape coming off due to  Friction of pull up and taking off right thigh high MIMI hose.     Assessment     Therapist forgot to measure prior to beginning session    at end of tx  Right LE  Particularly around his  Knee with visible   Minimal decrease in edema.     Strengths: Able to follow instructions, Alert and oriented, Independent prior level of function, Effective communication skills, Pleasant and cooperative, Supportive family  Barriers: Fatigue, Decreased endurance, Generalized weakness, Impaired activity tolerance, Impaired balance, Pain    Plan   ADL/IADLs and related functional mobility, AE training, R /pinch strengthening, standing tolerance/balance.  continue MLD and  Kinesiotape to assist with  " Edema management         Occupational Therapy Goals     Problem: Dressing     Dates: Start: 07/28/20       Goal: STG-Within one week, patient will dress LB     Dates: Start: 07/28/20       Description: 1) Individualized Goal:  Min assist with AE as needed  2) Interventions:  OT Self Care/ADL, OT Manual Ther Technique, OT Neuro Re-Ed/Balance, OT Sensory Int Techniques, OT Therapeutic Activity, OT Evaluation, and OT Therapeutic Exercise      Note:     Goal Note filed on 07/30/20 1102 by Wojciech Clements, C.O.T.A.    Patient Min assist to don underwear and pants     Requires mod assist for right sock and shoe   Limited by decreased strength/ endurance  limited ROM  right knee                         Problem: Functional Transfers     Dates: Start: 07/28/20       Description: 1) Individualized Goal:  CGA    2) Interventions:  OT Self Care/ADL, OT Manual Ther Technique, OT Neuro Re-Ed/Balance, OT Sensory Int Techniques, OT Therapeutic Activity, OT Evaluation, and OT Therapeutic Exercise    Goal: STG-Within one week, patient will transfer to toilet     Dates: Start: 07/28/20       Description: 1) Individualized Goal:  CGA    2) Interventions:  OT Self Care/ADL, OT Manual Ther Technique, OT Neuro Re-Ed/Balance, OT Sensory Int Techniques, OT Therapeutic Activity, OT Evaluation, and OT Therapeutic Exercise    Note:     Goal Note filed on 07/30/20 1102 by Wojciech Clements, C.O.T.A.    Not yet observed                           Problem: IADL's     Dates: Start: 07/28/20       Goal: STG-Within one week, patient will access kitchen area     Dates: Start: 07/28/20       Description: 1) Individualized Goal:  Min assist with FWW    2) Interventions:  OT Self Care/ADL, OT Manual Ther Technique, OT Neuro Re-Ed/Balance, OT Sensory Int Techniques, OT Therapeutic Activity, OT Evaluation, and OT Therapeutic Exercise    Note:     Goal Note filed on 07/30/20 1102 by Wojciech Clements, C.O.T.A.    Not yet addressed                             Problem: OT Long Term Goals     Dates: Start: 07/28/20       Goal: LTG-By discharge, patient will complete basic self care tasks     Dates: Start: 07/28/20       Description: 1) Individualized Goal:  Mod I with AE as needed    2) Interventions:  OT Self Care/ADL, OT Manual Ther Technique, OT Neuro Re-Ed/Balance, OT Sensory Int Techniques, OT Therapeutic Activity, OT Evaluation, and OT Therapeutic Exercise          Goal: LTG-By discharge, patient will perform bathroom transfers     Dates: Start: 07/28/20       Description: 1) Individualized Goal:  Mod I   2) Interventions:  OT Self Care/ADL, OT Manual Ther Technique, OT Neuro Re-Ed/Balance, OT Sensory Int Techniques, OT Therapeutic Activity, OT Evaluation, and OT Therapeutic Exercise

## 2020-08-04 NOTE — TELEPHONE ENCOUNTER
Yes I am fine with that as a virtual visit as long as it can be done with insurance. Please clear that with Leonor.     Dr Gutierrez

## 2020-08-04 NOTE — CARE PLAN
Problem: Safety  Goal: Will remain free from falls  Note: Call light kept within reach and encouraged to use for assistance and with toileting needs. Encouraged to call staff and to wait for staff before transfers.      Problem: Pain Management  Goal: Pain level will decrease to patient's comfort goal  Description: Pt able to participate in therapies and activities this shift.   Note: Complains of right knee pain, medicated with scheduled Gabapentin, ER MS and Lyrica, as well as prn MS IR.  Has + relief.  See MAR and doc flow sheet.  Will continue to monitor patient.

## 2020-08-04 NOTE — THERAPY
"Physical Therapy   Daily Treatment     Patient Name: Richard Hubbard II  Age:  45 y.o., Sex:  male  Medical Record #: [id number]  Today's Date: 8/3/2020     Precautions  Precautions: Fall Risk, Weight Bearing As Tolerated Right Lower Extremity  Comments: No pelvic precautions, ok for stretching/ROM and strengthening    Subjective    Pt is agreeable to PT session     Objective       08/03/20 1601   Gait Functional Level of Assist    Gait Level Of Assist Supervised   Assistive Device Front Wheel Walker   Distance (Feet) 100   # of Times Distance was Traveled 2   Deviation Antalgic;Bradykinetic;Decreased Heel Strike;Decreased Toe Off   Interdisciplinary Plan of Care Collaboration   IDT Collaboration with     Patient Position at End of Therapy In Bed   Collaboration Comments CM spoke to pt at beginning of tx session, inoformed pt of dc date   PT Total Time Spent   PT Individual Total Time Spent (Mins) 30   PT Charge Group   PT Gait Training 1   PT Therapeutic Exercise 1   Supervising Physical Therapist Agata Staley       Instructed pt on static self stretching with R LE on 4\" step 3 x 20\" stretches    Assessment    Pt is making improvements in overall mobility with the FWW, he demonstrated good carryover when provided vc for relaxing his shoulders which also decreased his UE support through the walker    Strengths: Supportive family, Motivated for self care and independence  Barriers: Limited mobility, Impaired balance, Impaired activity tolerance, Pain, Pain poorly managed, Decreased endurance, Fatigue    Plan    Gait training with FWW, transfer training reducing R knee ROM compensations, BLE strengthening, ROM and stretching (knee flexion and extension), standing and activity tolerance, stair/curb negotiation as tolerated, standing balance.    Physical Therapy Problems     Problem: Mobility     Dates: Start: 07/28/20       Goal: STG-Within one week, patient will ascend and descend four to six stairs     " "Dates: Start: 07/28/20       Description: 1) Individualized goal:  Patient will amb up/down 2\" // bars 2x CGA.   2) Interventions:  PT Gait Training, PT Self Care/Home Eval, PT Therapeutic Exercises, PT Neuro Re-Ed/Balance, PT Aquatic Therapy, PT Therapeutic Activity, PT Manual Therapy, and PT Evaluation                  Problem: Mobility Transfers     Dates: Start: 07/28/20       Goal: STG-Within one week, patient will transfer bed to chair     Dates: Start: 07/28/20       Description: 1) Individualized goal:  Patient will transfer CGA SPT with FWW, SBA bed mob   2) Interventions:  PT Gait Training, PT Self Care/Home Eval, PT Therapeutic Exercises, PT Neuro Re-Ed/Balance, PT Aquatic Therapy, PT Therapeutic Activity, PT Manual Therapy, and PT Evaluation                  Problem: PT-Long Term Goals     Dates: Start: 07/28/20       Goal: LTG-By discharge, patient will ambulate     Dates: Start: 07/28/20       Description: 1) Individualized goal:  Patient will amb >50 ft indoors mod I with FWW  2) Interventions:  PT Gait Training, PT Self Care/Home Eval, PT Therapeutic Exercises, PT Neuro Re-Ed/Balance, PT Aquatic Therapy, PT Therapeutic Activity, PT Manual Therapy, and PT Evaluation            Goal: LTG-By discharge, patient will transfer one surface to another     Dates: Start: 07/28/20       Description: 1) Individualized goal: Patient will transfer Mod I with FWW STS/SPT   2) Interventions:  PT Gait Training, PT Self Care/Home Eval, PT Therapeutic Exercises, PT Neuro Re-Ed/Balance, PT Aquatic Therapy, PT Therapeutic Activity, PT Manual Therapy, and PT Evaluation            Goal: LTG-By discharge, patient will ambulate up/down 4-6 stairs     Dates: Start: 07/28/20       Description: 1) Individualized goal:  Patient will amb up/down 2 standard stairs with no railing CGA  2) Interventions:  PT Gait Training, PT Self Care/Home Eval, PT Therapeutic Exercises, PT Neuro Re-Ed/Balance, PT Aquatic Therapy, PT Therapeutic " Activity, PT Manual Therapy, and PT Evaluation

## 2020-08-05 ENCOUNTER — APPOINTMENT (OUTPATIENT)
Dept: PHYSICAL MEDICINE AND REHAB | Facility: MEDICAL CENTER | Age: 45
End: 2020-08-05
Payer: COMMERCIAL

## 2020-08-05 PROCEDURE — 700102 HCHG RX REV CODE 250 W/ 637 OVERRIDE(OP): Performed by: PHYSICAL MEDICINE & REHABILITATION

## 2020-08-05 PROCEDURE — 97530 THERAPEUTIC ACTIVITIES: CPT

## 2020-08-05 PROCEDURE — 97116 GAIT TRAINING THERAPY: CPT

## 2020-08-05 PROCEDURE — 99232 SBSQ HOSP IP/OBS MODERATE 35: CPT | Mod: 25 | Performed by: PHYSICAL MEDICINE & REHABILITATION

## 2020-08-05 PROCEDURE — 770010 HCHG ROOM/CARE - REHAB SEMI PRIVAT*

## 2020-08-05 PROCEDURE — 97140 MANUAL THERAPY 1/> REGIONS: CPT | Mod: CO

## 2020-08-05 PROCEDURE — 97110 THERAPEUTIC EXERCISES: CPT

## 2020-08-05 PROCEDURE — A9270 NON-COVERED ITEM OR SERVICE: HCPCS | Performed by: PHYSICAL MEDICINE & REHABILITATION

## 2020-08-05 PROCEDURE — 99406 BEHAV CHNG SMOKING 3-10 MIN: CPT | Mod: GZ | Performed by: PHYSICAL MEDICINE & REHABILITATION

## 2020-08-05 RX ORDER — GABAPENTIN 300 MG/1
300 CAPSULE ORAL 3 TIMES DAILY
Status: DISCONTINUED | OUTPATIENT
Start: 2020-08-05 | End: 2020-08-08 | Stop reason: HOSPADM

## 2020-08-05 RX ADMIN — LOSARTAN POTASSIUM 25 MG: 25 TABLET, FILM COATED ORAL at 07:59

## 2020-08-05 RX ADMIN — Medication 500 MG: at 16:55

## 2020-08-05 RX ADMIN — TRAMADOL HYDROCHLORIDE 50 MG: 50 TABLET, COATED ORAL at 06:15

## 2020-08-05 RX ADMIN — Medication 500 MG: at 07:59

## 2020-08-05 RX ADMIN — POLYETHYLENE GLYCOL 3350 1 PACKET: 17 POWDER, FOR SOLUTION ORAL at 07:59

## 2020-08-05 RX ADMIN — MAGNESIUM HYDROXIDE 30 ML: 400 SUSPENSION ORAL at 15:14

## 2020-08-05 RX ADMIN — OMEPRAZOLE 20 MG: 20 CAPSULE, DELAYED RELEASE ORAL at 07:59

## 2020-08-05 RX ADMIN — DOCUSATE SODIUM 50 MG AND SENNOSIDES 8.6 MG 2 TABLET: 8.6; 5 TABLET, FILM COATED ORAL at 20:12

## 2020-08-05 RX ADMIN — MORPHINE SULFATE 15 MG: 15 TABLET ORAL at 20:13

## 2020-08-05 RX ADMIN — NICOTINE 7 MG: 7 PATCH, EXTENDED RELEASE TRANSDERMAL at 16:55

## 2020-08-05 RX ADMIN — TRAMADOL HYDROCHLORIDE 50 MG: 50 TABLET, COATED ORAL at 11:47

## 2020-08-05 RX ADMIN — GABAPENTIN 300 MG: 300 CAPSULE ORAL at 20:12

## 2020-08-05 RX ADMIN — DOCUSATE SODIUM 50 MG AND SENNOSIDES 8.6 MG 2 TABLET: 8.6; 5 TABLET, FILM COATED ORAL at 07:59

## 2020-08-05 RX ADMIN — PREGABALIN 150 MG: 150 CAPSULE ORAL at 07:59

## 2020-08-05 RX ADMIN — FERROUS SULFATE TAB 325 MG (65 MG ELEMENTAL FE) 325 MG: 325 (65 FE) TAB at 07:59

## 2020-08-05 RX ADMIN — ZOLPIDEM TARTRATE 5 MG: 5 TABLET ORAL at 23:17

## 2020-08-05 RX ADMIN — PREGABALIN 150 MG: 150 CAPSULE ORAL at 20:13

## 2020-08-05 RX ADMIN — MORPHINE SULFATE 15 MG: 15 TABLET ORAL at 21:29

## 2020-08-05 RX ADMIN — MORPHINE SULFATE 15 MG: 15 TABLET ORAL at 07:59

## 2020-08-05 RX ADMIN — MORPHINE SULFATE 15 MG: 15 TABLET, FILM COATED, EXTENDED RELEASE ORAL at 07:59

## 2020-08-05 RX ADMIN — MORPHINE SULFATE 15 MG: 15 TABLET ORAL at 15:17

## 2020-08-05 RX ADMIN — ASPIRIN 81 MG: 81 TABLET, COATED ORAL at 07:59

## 2020-08-05 RX ADMIN — PREGABALIN 150 MG: 150 CAPSULE ORAL at 14:30

## 2020-08-05 RX ADMIN — MORPHINE SULFATE 15 MG: 15 TABLET ORAL at 07:36

## 2020-08-05 RX ADMIN — MORPHINE SULFATE 15 MG: 15 TABLET ORAL at 01:40

## 2020-08-05 RX ADMIN — Medication 2000 UNITS: at 07:59

## 2020-08-05 RX ADMIN — MORPHINE SULFATE 15 MG: 15 TABLET ORAL at 05:02

## 2020-08-05 RX ADMIN — TRAMADOL HYDROCHLORIDE 50 MG: 50 TABLET, COATED ORAL at 16:55

## 2020-08-05 RX ADMIN — FERROUS SULFATE TAB 325 MG (65 MG ELEMENTAL FE) 325 MG: 325 (65 FE) TAB at 16:55

## 2020-08-05 RX ADMIN — MORPHINE SULFATE 15 MG: 15 TABLET, FILM COATED, EXTENDED RELEASE ORAL at 20:13

## 2020-08-05 RX ADMIN — MORPHINE SULFATE 15 MG: 15 TABLET ORAL at 14:30

## 2020-08-05 RX ADMIN — GABAPENTIN 300 MG: 300 CAPSULE ORAL at 14:31

## 2020-08-05 ASSESSMENT — GAIT ASSESSMENTS
DEVIATION: ANTALGIC;BRADYKINETIC;DECREASED HEEL STRIKE;DECREASED TOE OFF
GAIT LEVEL OF ASSIST: MODIFIED INDEPENDENT
DISTANCE (FEET): 200
ASSISTIVE DEVICE: FRONT WHEEL WALKER

## 2020-08-05 ASSESSMENT — ACTIVITIES OF DAILY LIVING (ADL): BED_CHAIR_WHEELCHAIR_TRANSFER_DESCRIPTION: ADAPTIVE EQUIPMENT;SUPERVISION FOR SAFETY;VERBAL CUEING

## 2020-08-05 NOTE — CARE PLAN
Problem: Safety  Goal: Will remain free from injury  Note: Pt now mod-I in wheelchair per PT     Problem: Pain Management  Goal: Pain level will decrease to patient's comfort go    Problem: Psychosocial Needs:  Goal: Level of anxiety will decrease  Outcome: PROGRESSING AS EXPECTED

## 2020-08-05 NOTE — THERAPY
"Occupational Therapy  Daily Treatment     Patient Name: Richard Hubbard II  Age:  45 y.o., Sex:  male  Medical Record #: [id number]  Today's Date: 8/5/2020     Precautions  Precautions: Fall Risk, Weight Bearing As Tolerated Right Lower Extremity  Comments: No pelvic precautions, ok for stretching/ROM and strengthening     Safety   ADL Safety : Requires Supervision for Safety  Bathroom Safety: Requires Supervision for Safety  Comments: see below notes for ADL performance details.    Subjective    \" It blew up in the afternoon after I worked with Agata.\" referring to right LE edema      Objective       08/05/20 0831   Precautions   Precautions Fall Risk;Weight Bearing As Tolerated Right Lower Extremity   Interdisciplinary Plan of Care Collaboration   Patient Position at End of Therapy In Bed;Call Light within Reach;Tray Table within Reach   OT Total Time Spent   OT Individual Total Time Spent (Mins) 30   OT Charge Group   OT Manual Therapy Technique 2      presented with no increased edema compared to post MLD tx  Day prior     MLD  Right LE  Groin to just below knee x 20 minutes    Kinesiotape applied right  LE   Just below knee along lymphatric  Tracts up toward groin.       Assessment     patient reports increased edema post PT session   Managed/decreased with ice and elevation      No increase in edema noted.   Strengths: Able to follow instructions, Alert and oriented, Independent prior level of function, Effective communication skills, Pleasant and cooperative, Supportive family  Barriers: Fatigue, Decreased endurance, Generalized weakness, Impaired activity tolerance, Impaired balance, Pain    Plan    ADL/IADLs and related functional mobility, AE training, R /pinch strengthening, standing tolerance/balance.  continue MLD and  Kinesiotape to assist with  Edema management     Occupational Therapy Goals     Problem: Dressing     Dates: Start: 07/28/20       Goal: STG-Within one week, patient will dress LB "     Dates: Start: 07/28/20       Description: 1) Individualized Goal:  Min assist with AE as needed  2) Interventions:  OT Self Care/ADL, OT Manual Ther Technique, OT Neuro Re-Ed/Balance, OT Sensory Int Techniques, OT Therapeutic Activity, OT Evaluation, and OT Therapeutic Exercise      Note:     Goal Note filed on 07/30/20 1102 by Wojciech Clements, C.O.T.A.    Patient Min assist to don underwear and pants     Requires mod assist for right sock and shoe   Limited by decreased strength/ endurance  limited ROM  right knee                         Problem: Functional Transfers     Dates: Start: 07/28/20       Description: 1) Individualized Goal:  CGA    2) Interventions:  OT Self Care/ADL, OT Manual Ther Technique, OT Neuro Re-Ed/Balance, OT Sensory Int Techniques, OT Therapeutic Activity, OT Evaluation, and OT Therapeutic Exercise    Goal: STG-Within one week, patient will transfer to toilet     Dates: Start: 07/28/20       Description: 1) Individualized Goal:  CGA    2) Interventions:  OT Self Care/ADL, OT Manual Ther Technique, OT Neuro Re-Ed/Balance, OT Sensory Int Techniques, OT Therapeutic Activity, OT Evaluation, and OT Therapeutic Exercise    Note:     Goal Note filed on 07/30/20 1102 by Wojciech Clements, C.O.T.A.    Not yet observed                           Problem: IADL's     Dates: Start: 07/28/20       Goal: STG-Within one week, patient will access kitchen area     Dates: Start: 07/28/20       Description: 1) Individualized Goal:  Min assist with FWW    2) Interventions:  OT Self Care/ADL, OT Manual Ther Technique, OT Neuro Re-Ed/Balance, OT Sensory Int Techniques, OT Therapeutic Activity, OT Evaluation, and OT Therapeutic Exercise    Note:     Goal Note filed on 07/30/20 1102 by Wojciech Clements, C.O.T.A.    Not yet addressed                            Problem: OT Long Term Goals     Dates: Start: 07/28/20       Goal: LTG-By discharge, patient will complete basic self care tasks     Dates: Start: 07/28/20        Description: 1) Individualized Goal:  Mod I with AE as needed    2) Interventions:  OT Self Care/ADL, OT Manual Ther Technique, OT Neuro Re-Ed/Balance, OT Sensory Int Techniques, OT Therapeutic Activity, OT Evaluation, and OT Therapeutic Exercise          Goal: LTG-By discharge, patient will perform bathroom transfers     Dates: Start: 07/28/20       Description: 1) Individualized Goal:  Mod I   2) Interventions:  OT Self Care/ADL, OT Manual Ther Technique, OT Neuro Re-Ed/Balance, OT Sensory Int Techniques, OT Therapeutic Activity, OT Evaluation, and OT Therapeutic Exercise

## 2020-08-05 NOTE — PROGRESS NOTES
"Rehab Progress Note     Encounter Date: 8/5/2020    CC: right knee pain, sacral pain, chronic pain    Interval Events (Subjective)  Patient sitting up in room. He reports ongoing neuropathic pain into right foot and toes. He reports it is mostly affecting his 3rd, 4th and 5th toes. He reports his right lateral calf is numb.  Discussed may be compression/irritation of nerve due to significant pre-op swelling. Discussed trial of low dose Gabapentin, discussed similar effect of Lyrica but slight difference. Discussed extension to 8/8 which he is in agreement. Denies NVD.     IDT Team Meeting 7/30/2020  DC/Disposition:  8/6/20 -> 8/8/20?    Objective:  VITAL SIGNS: /63   Pulse 72   Temp 36.9 °C (98.4 °F) (Oral)   Resp 18   Ht 1.803 m (5' 11\")   Wt 101.6 kg (224 lb)   SpO2 96%   BMI 31.24 kg/m²   Gen: NAD  Psych: Mood and affect appropriate  CV: RRR, no edema  Resp: CTAB, no upper airway sounds  Abd: NTND  Neuro: AOx4, walking with knee in extension with FWW, short step antalgic    Recent Results (from the past 72 hour(s))   CBC WITH DIFFERENTIAL    Collection Time: 08/04/20  9:49 AM   Result Value Ref Range    WBC 6.3 4.8 - 10.8 K/uL    RBC 3.38 (L) 4.70 - 6.10 M/uL    Hemoglobin 9.9 (L) 14.0 - 18.0 g/dL    Hematocrit 31.7 (L) 42.0 - 52.0 %    MCV 93.8 81.4 - 97.8 fL    MCH 29.3 27.0 - 33.0 pg    MCHC 31.2 (L) 33.7 - 35.3 g/dL    RDW 51.1 (H) 35.9 - 50.0 fL    Platelet Count 397 164 - 446 K/uL    MPV 8.7 (L) 9.0 - 12.9 fL    Neutrophils-Polys 69.20 44.00 - 72.00 %    Lymphocytes 17.00 (L) 22.00 - 41.00 %    Monocytes 9.00 0.00 - 13.40 %    Eosinophils 2.70 0.00 - 6.90 %    Basophils 1.00 0.00 - 1.80 %    Immature Granulocytes 1.10 (H) 0.00 - 0.90 %    Nucleated RBC 0.00 /100 WBC    Neutrophils (Absolute) 4.33 1.82 - 7.42 K/uL    Lymphs (Absolute) 1.06 1.00 - 4.80 K/uL    Monos (Absolute) 0.56 0.00 - 0.85 K/uL    Eos (Absolute) 0.17 0.00 - 0.51 K/uL    Baso (Absolute) 0.06 0.00 - 0.12 K/uL    Immature " Granulocytes (abs) 0.07 0.00 - 0.11 K/uL    NRBC (Absolute) 0.00 K/uL       Current Facility-Administered Medications   Medication Frequency   • polyethylene glycol/lytes (MIRALAX) PACKET 1 Packet DAILY    And   • senna-docusate (PERICOLACE or SENOKOT S) 8.6-50 MG per tablet 2 Tab BID    And   • magnesium hydroxide (MILK OF MAGNESIA) suspension 30 mL QDAY PRN    And   • bisacodyl (DULCOLAX) suppository 10 mg QDAY PRN   • bacitracin-polymyxin b (POLYSPORIN) 500-07212 UNIT/GM ointment PRN   • carboxymethylcellulose 1 % ophth soln 1 Drop PRN   • morphine ER (MS CONTIN) tablet 15 mg Q12HRS   • ferrous sulfate tablet 325 mg BID WITH MEALS   • ascorbic acid tablet 500 mg BID WITH MEALS   • vitamin D (cholecalciferol) tablet 2,000 Units DAILY   • Respiratory Therapy Consult Continuous RT   • tramadol (ULTRAM) 50 MG tablet 50 mg Q4HRS PRN   • hydrALAZINE (APRESOLINE) tablet 25 mg Q8HRS PRN   • acetaminophen (TYLENOL) tablet 650 mg Q4HRS PRN   • benzocaine-menthol (CEPACOL) lozenge 1 Lozenge Q2HRS PRN   • mag hydrox-al hydrox-simeth (MAALOX PLUS ES or MYLANTA DS) suspension 20 mL Q2HRS PRN   • ondansetron (ZOFRAN ODT) dispertab 4 mg 4X/DAY PRN    Or   • ondansetron (ZOFRAN) syringe/vial injection 4 mg 4X/DAY PRN   • traZODone (DESYREL) tablet 50 mg QHS PRN   • sodium chloride (OCEAN) 0.65 % nasal spray 2 Spray PRN   • zolpidem (AMBIEN) tablet 5 mg HS PRN   • aspirin EC (ECOTRIN) tablet 81 mg DAILY   • losartan (COZAAR) tablet 25 mg DAILY   • morphine (MS IR) tablet 15 mg Q6HRS PRN   • morphine (MS IR) tablet 15 mg Q6HRS   • nicotine (NICODERM) 7 MG/24HR 7 mg Q24HRS   • omeprazole (PRILOSEC) capsule 20 mg DAILY   • pregabalin (LYRICA) capsule 150 mg TID       Orders Placed This Encounter   Procedures   • Diet Order Regular     Standing Status:   Standing     Number of Occurrences:   1     Order Specific Question:   Diet:     Answer:   Regular [1]       Assessment:  Active Hospital Problems    Diagnosis   • *S/P total knee  arthroplasty, right   • Acute blood loss anemia   • Atrial fibrillation with rapid ventricular response (HCC)   • Paroxysmal atrial fibrillation (HCC)   • Pubic ramus fracture (HCC)   • Essential hypertension   • Chronic pain of right knee   • Rheumatoid arthritis (HCC)   • Anxiety disorder       Medical Decision Making and Plan:  R Knee replacement - Patient s/p right knee replacement with Dr. Pires for severe valgus deformity and insufficient MCL on 7/22/20. Post-operative course complicated by severe anemia.  -PT and OT for mobility and ADLs  -R knee immobilizer PRN. WBAT.  Needs to do more ROM at knee      RA - Multiple joint involvement. Previously on Xeljanz      Anemia - Severe post-operative anemia. Check AM CBC 8.6. Fe panel at [facility] with low Iron  -Start Fe supplement BID and Vitamin C. Recheck on 8/4/20    HTN/ Afib -  Patient on Losartan 25 mg daily.  Elevated > 140 x1. Being held due to hold parameters set high. Changed to < 100     GERD - PPI on transfer.      Chronic pain - Patient on Tylenol 1000 mg QID, Morphine 15 mg q6h, Lyrica 150 mg TID  -Continue to have break through pain. MS Contin 15 mg BID to have baseline. Discussed about weaning off at 7-10 days post-operative  -Worsening neuropathic pain in peroneal/superficial peroneal distribution - Gabapentin 300 mg TID     Tobacco abuse -  Currently on Nicotine patch. Counseling 8/5/20      Vitamin D deficiency - 19 on admission. Start 2000 U     GI - Opiate induced constipation. Start Miralax daily and Senna-Docusate    DVT Ppx - Patient with severe anemia. Currently on ASA 81 mg    Total time:  26 minutes.  I spent greater than 50% of the time for patient care, counseling, and coordination on this date, including unit/floor time, and face-to-face time with the patient as per interval events and assessment and plan above. Topics discussed included ongoing neuropathic pain, appears possible peroneal/superficial, trial of Gabapentin 300 mg, and  discussed extension to Saturday.     Keaton Pisano M.D.       I had a 5 minute discussion with patient on 8/5/2020 about smoking cessation.  Discussed that smoking is associated with respiratory, cardiovascular, oncologic, and neurologic illnesses.  Patient was provided advice and counseling on different methods of smoking cessation as well as provided supportive listening for psychologic aspect of addiction.

## 2020-08-05 NOTE — DISCHARGE PLANNING
"Discussed DC options w/ patient & Agata PT. Patient previously reluctant for HH or Outpatient therapy referrals. Requesting additional LOS coverage with insurance.  States \"I'm not comfortable going to OP therapy w/ current Covid issues. I'm immunocompromised & do not want to expose myself in the community to covid risk.\" Reviewed HH & therapy related to TKA. Agata provided insight for patient & HH knee therapy. Patient will discuss [facility] referral w/ wife & update choice to me. Questions answered. Emotional support provided.  "

## 2020-08-05 NOTE — THERAPY
Physical Therapy   Daily Treatment     Patient Name: Richard Hubbard II  Age:  45 y.o., Sex:  male  Medical Record #: [id number]  Today's Date: 8/5/2020     Precautions  Precautions: (P) Fall Risk, Weight Bearing As Tolerated Right Lower Extremity  Comments: (P) No pelvic precautions, ok for stretching/ROM and strengthening    Subjective    Patient seated in bed and agreeable to therapy.     Objective       08/05/20 1301   Precautions   Precautions Fall Risk;Weight Bearing As Tolerated Right Lower Extremity   Comments No pelvic precautions, ok for stretching/ROM and strengthening   Gait Functional Level of Assist    Gait Level Of Assist Modified Independent   Assistive Device Front Wheel Walker   Distance (Feet) 200   # of Times Distance was Traveled 2   Deviation Antalgic;Bradykinetic;Decreased Heel Strike;Decreased Toe Off  (decreased foot clearance)   Transfer Functional Level of Assist   Bed, Chair, Wheelchair Transfer Modified Independent   Bed Chair Wheelchair Transfer Description Adaptive equipment;Supervision for safety;Verbal cueing  (mod I with FWW)   Supine Lower Body Exercise   Bridges   (on bolster with alternating SLR 2x10)   Other Exercises prone RLE knee flexion with gentle overpressure at end range 2x10   Standing Lower Body Exercises   Heel Rise 2 sets of 10   Mini Squat 2 sets of 10   Bed Mobility    Supine to Sit Modified Independent   Sit to Supine Modified Independent   Sit to Stand Modified Independent   Scooting Modified Independent   Rolling Modified Independent   Interdisciplinary Plan of Care Collaboration   IDT Collaboration with  Occupational Therapist;Nursing   Patient Position at End of Therapy In Bed;Call Light within Reach;Tray Table within Reach;Phone within Reach   Collaboration Comments mod I status on unit with FWW   PT Total Time Spent   PT Individual Total Time Spent (Mins) 90   PT Charge Group   PT Gait Training 1   PT Therapeutic Exercise 3   PT Therapeutic Activities 2  "    Prone RLE knee flexion stretch in with contract-relax technique 3x90 seconds  Supine RLE knee extension stretch on bolster with contract-relax technique 3x90 seconds.    Assessment    Patient tolerated session well, agreeable to stretching and ROM exercises. Cleared to be mod I on unit with FWW, demonstrating and verbalizing understanding of safety education.    Strengths: Supportive family, Motivated for self care and independence  Barriers: Limited mobility, Impaired balance, Impaired activity tolerance, Pain, Pain poorly managed, Decreased endurance, Fatigue    Plan    Gait training with FWW, transfer training reducing R knee ROM compensations, BLE strengthening, ROM and stretching (knee flexion and extension) in all positions, standing and activity tolerance, stair/curb negotiation, standing balance.    Physical Therapy Problems     Problem: Mobility     Dates: Start: 07/28/20       Goal: STG-Within one week, patient will ascend and descend four to six stairs     Dates: Start: 07/28/20       Description: 1) Individualized goal:  Patient will amb up/down 2\" // bars 2x CGA.   2) Interventions:  PT Gait Training, PT Self Care/Home Eval, PT Therapeutic Exercises, PT Neuro Re-Ed/Balance, PT Aquatic Therapy, PT Therapeutic Activity, PT Manual Therapy, and PT Evaluation                  Problem: Mobility Transfers     Dates: Start: 07/28/20       Goal: STG-Within one week, patient will transfer bed to chair     Dates: Start: 07/28/20       Description: 1) Individualized goal:  Patient will transfer CGA SPT with FWW, SBA bed mob   2) Interventions:  PT Gait Training, PT Self Care/Home Eval, PT Therapeutic Exercises, PT Neuro Re-Ed/Balance, PT Aquatic Therapy, PT Therapeutic Activity, PT Manual Therapy, and PT Evaluation                  Problem: PT-Long Term Goals     Dates: Start: 07/28/20       Goal: LTG-By discharge, patient will ambulate     Dates: Start: 07/28/20       Description: 1) Individualized goal:  " Patient will amb >50 ft indoors mod I with FWW  2) Interventions:  PT Gait Training, PT Self Care/Home Eval, PT Therapeutic Exercises, PT Neuro Re-Ed/Balance, PT Aquatic Therapy, PT Therapeutic Activity, PT Manual Therapy, and PT Evaluation            Goal: LTG-By discharge, patient will transfer one surface to another     Dates: Start: 07/28/20       Description: 1) Individualized goal: Patient will transfer Mod I with FWW STS/SPT   2) Interventions:  PT Gait Training, PT Self Care/Home Eval, PT Therapeutic Exercises, PT Neuro Re-Ed/Balance, PT Aquatic Therapy, PT Therapeutic Activity, PT Manual Therapy, and PT Evaluation            Goal: LTG-By discharge, patient will ambulate up/down 4-6 stairs     Dates: Start: 07/28/20       Description: 1) Individualized goal:  Patient will amb up/down 2 standard stairs with no railing CGA  2) Interventions:  PT Gait Training, PT Self Care/Home Eval, PT Therapeutic Exercises, PT Neuro Re-Ed/Balance, PT Aquatic Therapy, PT Therapeutic Activity, PT Manual Therapy, and PT Evaluation

## 2020-08-05 NOTE — THERAPY
Occupational Therapy  Daily Treatment     Patient Name: Richard Hubbard II  Age:  45 y.o., Sex:  male  Medical Record #: [id number]  Today's Date: 8/5/2020     Precautions  Precautions: (P) Fall Risk, Weight Bearing As Tolerated Right Lower Extremity  Comments: (P) No pelvic precautions, ok for stretching/ROM and strengthening     Safety   ADL Safety : Requires Supervision for Safety  Bathroom Safety: Requires Supervision for Safety  Comments: see below notes for ADL performance details.    Subjective    Pt received seated edge of bed, donned shoes for therapy w/ AE and no assist, increased time     Objective       08/05/20 0901   Precautions   Precautions Fall Risk;Weight Bearing As Tolerated Right Lower Extremity   Comments No pelvic precautions, ok for stretching/ROM and strengthening    IADL Treatments   IADL Treatments Home management   Home Management SBA to load laundry into washing machine in standing position   OT Total Time Spent   OT Individual Total Time Spent (Mins) 60   OT Charge Group   OT Therapy Activity 4     Pt completed functional mobility with FWW with increased time from room <> gym with SBA  From elevated mat; sit to stands 2x10 with no UE support (FWW in front for safety) with SBA, min cues for pre-positioning, standing trials w/o UE support for 30 secs 2x10 with no LOB, min cues for core and glute activation   Seated edge of mat;  -scap retraction 2x15 with 5 second holds  -modified sit-ups with isometric hold 3x60 seconds    Assessment    Pt tolerated session well, demos continued progress with standing balance and tolerance, focus on reduction of UE support this session toward increased strengthening and increased safety during functional tasks, no instance of knee buckling, pronates at ankles due to RA deformity, requires frequent seated rest breaks for pain management  Strengths: Able to follow instructions, Alert and oriented, Independent prior level of function, Effective  communication skills, Pleasant and cooperative, Supportive family  Barriers: Fatigue, Decreased endurance, Generalized weakness, Impaired activity tolerance, Impaired balance, Pain    Plan    ADL/IADLs and related functional mobility, AE training, R /pinch strengthening, standing tolerance/balance.  continue MLD and  Kinesiotape to assist with  Edema management     Occupational Therapy Goals     Problem: Dressing     Dates: Start: 07/28/20       Goal: STG-Within one week, patient will dress LB     Dates: Start: 07/28/20       Description: 1) Individualized Goal:  Min assist with AE as needed  2) Interventions:  OT Self Care/ADL, OT Manual Ther Technique, OT Neuro Re-Ed/Balance, OT Sensory Int Techniques, OT Therapeutic Activity, OT Evaluation, and OT Therapeutic Exercise      Note:     Goal Note filed on 07/30/20 1102 by Wojciech Clements, C.O.T.A.    Patient Min assist to don underwear and pants     Requires mod assist for right sock and shoe   Limited by decreased strength/ endurance  limited ROM  right knee                         Problem: Functional Transfers     Dates: Start: 07/28/20       Description: 1) Individualized Goal:  CGA    2) Interventions:  OT Self Care/ADL, OT Manual Ther Technique, OT Neuro Re-Ed/Balance, OT Sensory Int Techniques, OT Therapeutic Activity, OT Evaluation, and OT Therapeutic Exercise    Goal: STG-Within one week, patient will transfer to toilet     Dates: Start: 07/28/20       Description: 1) Individualized Goal:  CGA    2) Interventions:  OT Self Care/ADL, OT Manual Ther Technique, OT Neuro Re-Ed/Balance, OT Sensory Int Techniques, OT Therapeutic Activity, OT Evaluation, and OT Therapeutic Exercise    Note:     Goal Note filed on 07/30/20 1102 by Wojciech Clements, C.O.T.A.    Not yet observed                           Problem: IADL's     Dates: Start: 07/28/20       Goal: STG-Within one week, patient will access kitchen area     Dates: Start: 07/28/20       Description: 1)  Individualized Goal:  Min assist with FWW    2) Interventions:  OT Self Care/ADL, OT Manual Ther Technique, OT Neuro Re-Ed/Balance, OT Sensory Int Techniques, OT Therapeutic Activity, OT Evaluation, and OT Therapeutic Exercise    Note:     Goal Note filed on 07/30/20 1102 by Wojciech Clements C.O.T.A.    Not yet addressed                            Problem: OT Long Term Goals     Dates: Start: 07/28/20       Goal: LTG-By discharge, patient will complete basic self care tasks     Dates: Start: 07/28/20       Description: 1) Individualized Goal:  Mod I with AE as needed    2) Interventions:  OT Self Care/ADL, OT Manual Ther Technique, OT Neuro Re-Ed/Balance, OT Sensory Int Techniques, OT Therapeutic Activity, OT Evaluation, and OT Therapeutic Exercise          Goal: LTG-By discharge, patient will perform bathroom transfers     Dates: Start: 07/28/20       Description: 1) Individualized Goal:  Mod I   2) Interventions:  OT Self Care/ADL, OT Manual Ther Technique, OT Neuro Re-Ed/Balance, OT Sensory Int Techniques, OT Therapeutic Activity, OT Evaluation, and OT Therapeutic Exercise

## 2020-08-06 PROCEDURE — 97140 MANUAL THERAPY 1/> REGIONS: CPT

## 2020-08-06 PROCEDURE — 97530 THERAPEUTIC ACTIVITIES: CPT

## 2020-08-06 PROCEDURE — 770010 HCHG ROOM/CARE - REHAB SEMI PRIVAT*

## 2020-08-06 PROCEDURE — 97110 THERAPEUTIC EXERCISES: CPT

## 2020-08-06 PROCEDURE — 97116 GAIT TRAINING THERAPY: CPT

## 2020-08-06 PROCEDURE — 700102 HCHG RX REV CODE 250 W/ 637 OVERRIDE(OP): Performed by: PHYSICAL MEDICINE & REHABILITATION

## 2020-08-06 PROCEDURE — A9270 NON-COVERED ITEM OR SERVICE: HCPCS | Performed by: PHYSICAL MEDICINE & REHABILITATION

## 2020-08-06 PROCEDURE — 97112 NEUROMUSCULAR REEDUCATION: CPT

## 2020-08-06 PROCEDURE — 99233 SBSQ HOSP IP/OBS HIGH 50: CPT | Performed by: PHYSICAL MEDICINE & REHABILITATION

## 2020-08-06 RX ADMIN — MORPHINE SULFATE 15 MG: 15 TABLET, FILM COATED, EXTENDED RELEASE ORAL at 20:40

## 2020-08-06 RX ADMIN — PREGABALIN 150 MG: 150 CAPSULE ORAL at 20:38

## 2020-08-06 RX ADMIN — MORPHINE SULFATE 15 MG: 15 TABLET ORAL at 15:54

## 2020-08-06 RX ADMIN — TRAMADOL HYDROCHLORIDE 50 MG: 50 TABLET, COATED ORAL at 13:31

## 2020-08-06 RX ADMIN — MORPHINE SULFATE 15 MG: 15 TABLET ORAL at 18:34

## 2020-08-06 RX ADMIN — FERROUS SULFATE TAB 325 MG (65 MG ELEMENTAL FE) 325 MG: 325 (65 FE) TAB at 08:10

## 2020-08-06 RX ADMIN — GABAPENTIN 300 MG: 300 CAPSULE ORAL at 15:54

## 2020-08-06 RX ADMIN — MAGNESIUM HYDROXIDE 30 ML: 400 SUSPENSION ORAL at 17:06

## 2020-08-06 RX ADMIN — MORPHINE SULFATE 15 MG: 15 TABLET ORAL at 20:40

## 2020-08-06 RX ADMIN — PREGABALIN 150 MG: 150 CAPSULE ORAL at 15:54

## 2020-08-06 RX ADMIN — PREGABALIN 150 MG: 150 CAPSULE ORAL at 08:09

## 2020-08-06 RX ADMIN — ASPIRIN 81 MG: 81 TABLET, COATED ORAL at 08:10

## 2020-08-06 RX ADMIN — NICOTINE 7 MG: 7 PATCH, EXTENDED RELEASE TRANSDERMAL at 17:04

## 2020-08-06 RX ADMIN — OMEPRAZOLE 20 MG: 20 CAPSULE, DELAYED RELEASE ORAL at 08:09

## 2020-08-06 RX ADMIN — TRAMADOL HYDROCHLORIDE 50 MG: 50 TABLET, COATED ORAL at 07:47

## 2020-08-06 RX ADMIN — MORPHINE SULFATE 15 MG: 15 TABLET ORAL at 12:23

## 2020-08-06 RX ADMIN — MORPHINE SULFATE 15 MG: 15 TABLET ORAL at 05:25

## 2020-08-06 RX ADMIN — MORPHINE SULFATE 15 MG: 15 TABLET ORAL at 04:53

## 2020-08-06 RX ADMIN — MORPHINE SULFATE 15 MG: 15 TABLET ORAL at 08:09

## 2020-08-06 RX ADMIN — MORPHINE SULFATE 15 MG: 15 TABLET, FILM COATED, EXTENDED RELEASE ORAL at 08:10

## 2020-08-06 RX ADMIN — DOCUSATE SODIUM 50 MG AND SENNOSIDES 8.6 MG 2 TABLET: 8.6; 5 TABLET, FILM COATED ORAL at 08:09

## 2020-08-06 RX ADMIN — Medication 500 MG: at 17:04

## 2020-08-06 RX ADMIN — Medication 2000 UNITS: at 08:09

## 2020-08-06 RX ADMIN — LOSARTAN POTASSIUM 25 MG: 25 TABLET, FILM COATED ORAL at 08:10

## 2020-08-06 RX ADMIN — GABAPENTIN 300 MG: 300 CAPSULE ORAL at 20:37

## 2020-08-06 RX ADMIN — TRAMADOL HYDROCHLORIDE 50 MG: 50 TABLET, COATED ORAL at 17:33

## 2020-08-06 RX ADMIN — ZOLPIDEM TARTRATE 5 MG: 5 TABLET ORAL at 23:05

## 2020-08-06 RX ADMIN — DOCUSATE SODIUM 50 MG AND SENNOSIDES 8.6 MG 2 TABLET: 8.6; 5 TABLET, FILM COATED ORAL at 20:38

## 2020-08-06 RX ADMIN — POLYETHYLENE GLYCOL 3350 1 PACKET: 17 POWDER, FOR SOLUTION ORAL at 08:13

## 2020-08-06 RX ADMIN — Medication 500 MG: at 08:09

## 2020-08-06 RX ADMIN — GABAPENTIN 300 MG: 300 CAPSULE ORAL at 08:10

## 2020-08-06 RX ADMIN — FERROUS SULFATE TAB 325 MG (65 MG ELEMENTAL FE) 325 MG: 325 (65 FE) TAB at 17:04

## 2020-08-06 ASSESSMENT — ACTIVITIES OF DAILY LIVING (ADL)
TOILET_TRANSFER_DESCRIPTION: GRAB BAR;INCREASED TIME
TUB_SHOWER_TRANSFER_DESCRIPTION: GRAB BAR;SHOWER BENCH
TOILETING_LEVEL_OF_ASSIST_DESCRIPTION: GRAB BAR;INCREASED TIME

## 2020-08-06 ASSESSMENT — GAIT ASSESSMENTS
DISTANCE (FEET): 200
GAIT LEVEL OF ASSIST: MODIFIED INDEPENDENT
DEVIATION: ANTALGIC;BRADYKINETIC
ASSISTIVE DEVICE: FRONT WHEEL WALKER

## 2020-08-06 NOTE — DISCHARGE PLANNING
HH referral sent to [facility] in [place] per choice form.  Awaiting response.

## 2020-08-06 NOTE — FLOWSHEET NOTE
08/06/20 1226   Patient History   Pulmonary Diagnosis MONICA, does not nicolas or use CPAP, Anemia   Procedures Relevant to Respiratory Status None   Home O2 No   Nocturnal CPAP No   Home Treatments/Frequency No   Sleep Apnea Screening   Have you had a sleep study? Yes   Have you been diagnosed with sleep apnea? Yes   COPD Risk Screening   Do you have a history of COPD? No   Protocol Pathways   Protocol Pathways None

## 2020-08-06 NOTE — CARE PLAN
Problem: IADL's  Goal: STG-Within one week, patient will access kitchen area  Description: 1) Individualized Goal:  Min assist with FWW    2) Interventions:  OT Self Care/ADL, OT Manual Ther Technique, OT Neuro Re-Ed/Balance, OT Sensory Int Techniques, OT Therapeutic Activity, OT Evaluation, and OT Therapeutic Exercise  Outcome: PROGRESSING AS EXPECTED     Problem: Dressing  Goal: STG-Within one week, patient will dress LB  Description: 1) Individualized Goal:  Min assist with AE as needed  2) Interventions:  OT Self Care/ADL, OT Manual Ther Technique, OT Neuro Re-Ed/Balance, OT Sensory Int Techniques, OT Therapeutic Activity, OT Evaluation, and OT Therapeutic Exercise    Outcome: MET     Problem: Functional Transfers  Goal: STG-Within one week, patient will transfer to toilet  Description: 1) Individualized Goal:  CGA    2) Interventions:  OT Self Care/ADL, OT Manual Ther Technique, OT Neuro Re-Ed/Balance, OT Sensory Int Techniques, OT Therapeutic Activity, OT Evaluation, and OT Therapeutic Exercise  Outcome: MET

## 2020-08-06 NOTE — CARE PLAN
Problem: Recreation Therapy  Goal: STG-Within one week, patient will demonstrate leisure problem solving  Description: By sharing on two leisure activities he would like to participate in either his sessions or in his free time and any perceived barriers to her participation.   Outcome: MET  Goal: LTG-By discharge, patient will demonstrate leisure problem solving  Description: By sharing on two leisure activities he would like to participate in following discharge and any perceived barriers to his participation.   Outcome: MET

## 2020-08-06 NOTE — THERAPY
Recreational Therapy  Daily Treatment     Patient Name: Richard Hubbard II  AGE:  45 y.o., SEX:  male  Medical Record #: [id number]  Today's Date: 8/6/2020       Subjective    Pt reporting that Wii is a good distraction from his pain.      Objective       08/06/20 1301   Functional Ability Status - Cognitive   Attention Span Remains on Task   Comprehension Follows Three Step Commands   Judgment Able to Make Independent Decisions   Functional Ability Status - Emotional    Affect Appropriate   Mood Appropriate   Behavior Appropriate   Skilled Intervention    Skilled Intervention Relaxation / Coping Skills   Skilled Intervention Comments Wii bowling   Interdisciplinary Plan of Care Collaboration   Patient Position at End of Therapy   (Mod I on unit with 4WW)   Strengths & Barriers   Strengths Able to follow instructions;Adequate strength;Good carryover of learning;Good endurance;Good insight into deficits/needs;Making steady progress towards goals;Manages pain appropriately;Motivated for self care and independence;Pleasant and cooperative;Willingly participates in therapeutic activities;Supportive family   Barriers Impaired activity tolerance;Impaired balance   Treatment Time   Total Time Spent (mins) 15   Procedural Tracking   Procedural Tracking Leisure Skills Development       Assessment    Pt was taken to the gym to play Wii bowling as a positive coping skills.    Strengths: (P) Able to follow instructions, Adequate strength, Good carryover of learning, Good endurance, Good insight into deficits/needs, Making steady progress towards goals, Manages pain appropriately, Motivated for self care and independence, Pleasant and cooperative, Willingly participates in therapeutic activities, Supportive family  Barriers: (P) Impaired activity tolerance, Impaired balance    Plan    Discharge planning

## 2020-08-06 NOTE — THERAPY
Occupational Therapy  Daily Treatment     Patient Name: Richard Hubbard II  Age:  45 y.o., Sex:  male  Medical Record #: [id number]  Today's Date: 8/6/2020     Precautions  Precautions: (P) Fall Risk, Weight Bearing As Tolerated Right Lower Extremity  Comments: (P) No pelvic precautions, ok for stretching/ROM and strengthening    Safety   ADL Safety : Requires Supervision for Safety  Bathroom Safety: Requires Supervision for Safety  Comments: see below notes for ADL performance details.    Subjective    Pt received seated edge of bed, agreeable to OT session     Objective       08/06/20 0931   Precautions   Precautions Fall Risk;Weight Bearing As Tolerated Right Lower Extremity   Comments No pelvic precautions, ok for stretching/ROM and strengthening   Sitting Upper Body Exercises   Upper Extremity Bike Level 3 Resistance  (20 min for BUE strength and endurance)   Other Exercise core therex seated edge of mat; russion twists 2x10 each side, isometric holds 2x1:30 sec   Sitting Lower Body Exercises   Sitting Lower Body Exercises Yes   Sit to Stand 2 sets of 10  (from elevated mat, UE support as needed for pushing off)   Balance   Comments with no UE support; ladderball reaching back to mat behind him to grab game pieces x7, alteranting R/L reach back, min A for stability. Balloon volley x6 minutes with intermittent UE support   OT Total Time Spent   OT Individual Total Time Spent (Mins) 90   OT Charge Group   OT Therapy Activity 6       Assessment    Pt tolerated session well, luisa continued improvements in standing balance/tolerance with reduction of UE support, improvements in control during sit to stands. Declined kitchen mobility practice however able to verbalize safety strategies and modifications. Requires cues to take rest breaks for management of knee stability, icing breaks x2 during extended session for pain management.   Strengths: Able to follow instructions, Alert and oriented, Independent prior  level of function, Effective communication skills, Pleasant and cooperative, Supportive family  Barriers: Fatigue, Decreased endurance, Generalized weakness, Impaired activity tolerance, Impaired balance, Pain    Plan    ADL/IADLs and related functional mobility, AE training, R /pinch strengthening, standing tolerance/balance.  continue MLD and  Kinesiotape to assist with  Edema management     Occupational Therapy Goals     Problem: IADL's     Dates: Start: 07/28/20       Goal: STG-Within one week, patient will access kitchen area     Dates: Start: 07/28/20       Description: 1) Individualized Goal:  Min assist with FWW    2) Interventions:  OT Self Care/ADL, OT Manual Ther Technique, OT Neuro Re-Ed/Balance, OT Sensory Int Techniques, OT Therapeutic Activity, OT Evaluation, and OT Therapeutic Exercise    Note:     Goal Note filed on 07/30/20 1102 by Wojciech Clements, C.O.T.A.    Not yet addressed                            Problem: OT Long Term Goals     Dates: Start: 07/28/20       Goal: LTG-By discharge, patient will complete basic self care tasks     Dates: Start: 07/28/20       Description: 1) Individualized Goal:  Mod I with AE as needed    2) Interventions:  OT Self Care/ADL, OT Manual Ther Technique, OT Neuro Re-Ed/Balance, OT Sensory Int Techniques, OT Therapeutic Activity, OT Evaluation, and OT Therapeutic Exercise          Goal: LTG-By discharge, patient will perform bathroom transfers     Dates: Start: 07/28/20       Description: 1) Individualized Goal:  Mod I   2) Interventions:  OT Self Care/ADL, OT Manual Ther Technique, OT Neuro Re-Ed/Balance, OT Sensory Int Techniques, OT Therapeutic Activity, OT Evaluation, and OT Therapeutic Exercise

## 2020-08-06 NOTE — THERAPY
Physical Therapy   Daily Treatment     Patient Name: Richard Hubbard II  Age:  45 y.o., Sex:  male  Medical Record #: [id number]  Today's Date: 8/6/2020     Precautions  Precautions: (P) Fall Risk, Weight Bearing As Tolerated Right Lower Extremity  Comments: (P) No pelvic precautions, ok for stretching/ROM and strengthening    Subjective    Patient agreeable to treatment- recognizes stretching is biggest benefit he is receiving from therapy services at this time     Objective       08/06/20 1101   Precautions   Precautions Fall Risk;Weight Bearing As Tolerated Right Lower Extremity   Comments No pelvic precautions, ok for stretching/ROM and strengthening   Gait Functional Level of Assist    Gait Level Of Assist Modified Independent   Assistive Device Front Wheel Walker   Distance (Feet) 200   # of Times Distance was Traveled 1   Deviation Antalgic;Bradykinetic   Supine Lower Body Exercise   Other Exercises supine bridges 10x, hamstring drags on gymball bilateral LE 10x   Sitting Lower Body Exercises   Other Exercises skateboard AROM 5min in flexion/extension, stationary bike pedals stretching 5min, able to make 10x revolutions forward and backward   Interdisciplinary Plan of Care Collaboration   IDT Collaboration with  Physical Therapist;Occupational Therapist   Collaboration Comments treatment planning   PT Total Time Spent   PT Individual Total Time Spent (Mins) 90   PT Charge Group   PT Gait Training 1   PT Therapeutic Exercise 1   PT Neuromuscular Re-Education / Balance 1   PT Therapeutic Activities 1   PT Manual Therapy 2   CP to knee joint 10min priot to PROM, during NMES, after session for pain relief  TENS to right knee- 10min at 10mA for pain control  PROM supine for knee extension- ankle over bolster, 3lb weight on thigh to promote extension 3min  PROM prone for knee flexion- 4x 30sec to patient tolerance    Assessment    Right knee ROM approx -12°- 85°; able to demonstrate stationary bike revolutions  with minimal right hip hiking for compensation    Strengths: Supportive family, Motivated for self care and independence  Barriers: Limited mobility, Impaired balance, Impaired activity tolerance, Pain, Pain poorly managed, Decreased endurance, Fatigue    Plan    Reassess knee ROM in flexion/extension; reassess stair plan for home entrance (2-3 platform stairs using FWW) ; review HEP; prepare for d/c home to [place] at FWW level on 8/8    Physical Therapy Problems     Problem: PT-Long Term Goals     Dates: Start: 07/28/20       Goal: LTG-By discharge, patient will ambulate     Dates: Start: 07/28/20       Description: 1) Individualized goal:  Patient will amb >50 ft indoors mod I with FWW  2) Interventions:  PT Gait Training, PT Self Care/Home Eval, PT Therapeutic Exercises, PT Neuro Re-Ed/Balance, PT Aquatic Therapy, PT Therapeutic Activity, PT Manual Therapy, and PT Evaluation            Goal: LTG-By discharge, patient will transfer one surface to another     Dates: Start: 07/28/20       Description: 1) Individualized goal: Patient will transfer Mod I with FWW STS/SPT   2) Interventions:  PT Gait Training, PT Self Care/Home Eval, PT Therapeutic Exercises, PT Neuro Re-Ed/Balance, PT Aquatic Therapy, PT Therapeutic Activity, PT Manual Therapy, and PT Evaluation            Goal: LTG-By discharge, patient will ambulate up/down 4-6 stairs     Dates: Start: 07/28/20       Description: 1) Individualized goal:  Patient will amb up/down 2 standard stairs with no railing CGA  2) Interventions:  PT Gait Training, PT Self Care/Home Eval, PT Therapeutic Exercises, PT Neuro Re-Ed/Balance, PT Aquatic Therapy, PT Therapeutic Activity, PT Manual Therapy, and PT Evaluation

## 2020-08-06 NOTE — CARE PLAN
Problem: Safety  Goal: Will remain free from falls  Intervention: Implement fall precautions  Note: Appropriately uses call light to make needs known.Fall precautions and frequent rounding in place for safety.Call light within reach.Will continue to monitor and assess needs and safety.     Problem: Pain Management  Goal: Pain level will decrease to patient's comfort goal  Description: Pt able to participate in therapies and activities this shift.   Intervention: Follow pain managment plan developed in collaboration with patient and Interdisciplinary Team  Note: Medicated per request for pain.Repositioned with pillows for comfort.Will continue to monitor and assess pain level and medicate as needed.

## 2020-08-06 NOTE — CARE PLAN
"  Problem: Mobility  Goal: STG-Within one week, patient will ascend and descend four to six stairs  Description: 1) Individualized goal:  Patient will amb up/down 2\" // bars 2x CGA.   2) Interventions:  PT Gait Training, PT Self Care/Home Eval, PT Therapeutic Exercises, PT Neuro Re-Ed/Balance, PT Aquatic Therapy, PT Therapeutic Activity, PT Manual Therapy, and PT Evaluation    Outcome: MET     Problem: Mobility Transfers  Goal: STG-Within one week, patient will transfer bed to chair  Description: 1) Individualized goal:  Patient will transfer CGA SPT with FWW, SBA bed mob   2) Interventions:  PT Gait Training, PT Self Care/Home Eval, PT Therapeutic Exercises, PT Neuro Re-Ed/Balance, PT Aquatic Therapy, PT Therapeutic Activity, PT Manual Therapy, and PT Evaluation    Outcome: MET     "

## 2020-08-06 NOTE — PROGRESS NOTES
"Rehab Progress Note     Encounter Date: 8/6/2020    CC: right knee pain, sacral pain, chronic pain    Interval Events (Subjective)  Patient sitting up in therapy gym. He reports no change in his neuropathic pain shooting into his foot. Discussed that may take a few doses of Gabapentin to work. He reports he is improving his ROM at the knee but continues to be difficult to move.  He reports he is trying to space out his pain medications. Discussed will need to follow-up with Dr. Gutierrez after discharge. Discussed he is scheduled for next Wednesday in Dr. Gutierrez's clinic. Denies SOB. Denies NVD. He has questions about his anemia, reviewed labs from yesterday and patient with improving anemia at 9.9.    IDT Team Meeting 8/6/2020    I, Keaton Pisano M.D., was present and led the interdisciplinary team conference on 8/6/2020.  I led the IDT conference and agree with the IDT conference documentation and plan of care as noted below.     RN:  Diet Regular   % Meal     Pain Always using morphine   Sleep    Bowel Continent - constipated   Bladder Continent   In's & Out's      PT:  Bed Mobility Alfonzo   Transfers Alfonzo   Mobility CGA-Alfonzo   Stairs    Depending on pain can be Alfonzo    OT:  Eating    Grooming    Bathing supervs   UB Dressing    LB Dressing    Toileting    Shower & Transfer supervs   Issues with bathroom at home  Limited by pain and shower    Rec:  No issues    CM:  Continues to work on disposition and DME needs.      DC/Disposition:  8/8/20    Objective:  VITAL SIGNS: /69   Pulse 85   Temp 36.6 °C (97.9 °F) (Oral)   Resp 16   Ht 1.803 m (5' 11\")   Wt 101.6 kg (224 lb)   SpO2 95%   BMI 31.24 kg/m²   Gen: NAD  Psych: Mood and affect appropriate  CV: RRR, no edema  Resp: CTAB, no upper airway sounds  Abd: NTND  Neuro: AOx4, walking with knee in extension with FWW, short step antalgic  Unchanged from 8/5/20    Recent Results (from the past 72 hour(s))   CBC WITH DIFFERENTIAL    Collection Time: " 08/04/20  9:49 AM   Result Value Ref Range    WBC 6.3 4.8 - 10.8 K/uL    RBC 3.38 (L) 4.70 - 6.10 M/uL    Hemoglobin 9.9 (L) 14.0 - 18.0 g/dL    Hematocrit 31.7 (L) 42.0 - 52.0 %    MCV 93.8 81.4 - 97.8 fL    MCH 29.3 27.0 - 33.0 pg    MCHC 31.2 (L) 33.7 - 35.3 g/dL    RDW 51.1 (H) 35.9 - 50.0 fL    Platelet Count 397 164 - 446 K/uL    MPV 8.7 (L) 9.0 - 12.9 fL    Neutrophils-Polys 69.20 44.00 - 72.00 %    Lymphocytes 17.00 (L) 22.00 - 41.00 %    Monocytes 9.00 0.00 - 13.40 %    Eosinophils 2.70 0.00 - 6.90 %    Basophils 1.00 0.00 - 1.80 %    Immature Granulocytes 1.10 (H) 0.00 - 0.90 %    Nucleated RBC 0.00 /100 WBC    Neutrophils (Absolute) 4.33 1.82 - 7.42 K/uL    Lymphs (Absolute) 1.06 1.00 - 4.80 K/uL    Monos (Absolute) 0.56 0.00 - 0.85 K/uL    Eos (Absolute) 0.17 0.00 - 0.51 K/uL    Baso (Absolute) 0.06 0.00 - 0.12 K/uL    Immature Granulocytes (abs) 0.07 0.00 - 0.11 K/uL    NRBC (Absolute) 0.00 K/uL       Current Facility-Administered Medications   Medication Frequency   • gabapentin (NEURONTIN) capsule 300 mg TID   • polyethylene glycol/lytes (MIRALAX) PACKET 1 Packet DAILY    And   • senna-docusate (PERICOLACE or SENOKOT S) 8.6-50 MG per tablet 2 Tab BID    And   • magnesium hydroxide (MILK OF MAGNESIA) suspension 30 mL QDAY PRN    And   • bisacodyl (DULCOLAX) suppository 10 mg QDAY PRN   • bacitracin-polymyxin b (POLYSPORIN) 500-60265 UNIT/GM ointment PRN   • carboxymethylcellulose 1 % ophth soln 1 Drop PRN   • morphine ER (MS CONTIN) tablet 15 mg Q12HRS   • ferrous sulfate tablet 325 mg BID WITH MEALS   • ascorbic acid tablet 500 mg BID WITH MEALS   • vitamin D (cholecalciferol) tablet 2,000 Units DAILY   • Respiratory Therapy Consult Continuous RT   • tramadol (ULTRAM) 50 MG tablet 50 mg Q4HRS PRN   • hydrALAZINE (APRESOLINE) tablet 25 mg Q8HRS PRN   • acetaminophen (TYLENOL) tablet 650 mg Q4HRS PRN   • benzocaine-menthol (CEPACOL) lozenge 1 Lozenge Q2HRS PRN   • mag hydrox-al hydrox-simeth (MAALOX  PLUS ES or MYLANTA DS) suspension 20 mL Q2HRS PRN   • ondansetron (ZOFRAN ODT) dispertab 4 mg 4X/DAY PRN    Or   • ondansetron (ZOFRAN) syringe/vial injection 4 mg 4X/DAY PRN   • traZODone (DESYREL) tablet 50 mg QHS PRN   • sodium chloride (OCEAN) 0.65 % nasal spray 2 Spray PRN   • zolpidem (AMBIEN) tablet 5 mg HS PRN   • aspirin EC (ECOTRIN) tablet 81 mg DAILY   • losartan (COZAAR) tablet 25 mg DAILY   • morphine (MS IR) tablet 15 mg Q6HRS PRN   • morphine (MS IR) tablet 15 mg Q6HRS   • nicotine (NICODERM) 7 MG/24HR 7 mg Q24HRS   • omeprazole (PRILOSEC) capsule 20 mg DAILY   • pregabalin (LYRICA) capsule 150 mg TID       Orders Placed This Encounter   Procedures   • Diet Order Regular     Standing Status:   Standing     Number of Occurrences:   1     Order Specific Question:   Diet:     Answer:   Regular [1]       Assessment:  Active Hospital Problems    Diagnosis   • *S/P total knee arthroplasty, right   • Acute blood loss anemia   • Atrial fibrillation with rapid ventricular response (HCC)   • Paroxysmal atrial fibrillation (HCC)   • Pubic ramus fracture (HCC)   • Essential hypertension   • Chronic pain of right knee   • Rheumatoid arthritis (HCC)   • Anxiety disorder       Medical Decision Making and Plan:  R Knee replacement - Patient s/p right knee replacement with Dr. Pires for severe valgus deformity and insufficient MCL on 7/22/20. Post-operative course complicated by severe anemia.  -PT and OT for mobility and ADLs  -R knee immobilizer PRN. WBAT.  Needs to do more ROM at knee      RA - Multiple joint involvement. Previously on Xeljanz      Anemia - Severe post-operative anemia. Check AM CBC 8.6. Fe panel at [facility] with low Iron  -Start Fe supplement BID and Vitamin C. Recheck on 8/4/20 - 9.9, improving on Fe supplement  -Requiring oxygen at night, nocturnal     HTN/ Afib -  Patient on Losartan 25 mg daily.  Elevated > 140 x1. Being held due to hold parameters set high. Changed to < 100     GERD - PPI on  transfer.      Chronic pain - Patient on Tylenol 1000 mg QID, Morphine 15 mg q6h, Lyrica 150 mg TID  -Continue to have break through pain. MS Contin 15 mg BID to have baseline. Discussed about weaning off at 7-10 days post-operative  -Worsening neuropathic pain in peroneal/superficial peroneal distribution - Gabapentin 300 mg TID     Tobacco abuse -  Currently on Nicotine patch. Counseling 8/5/20      Vitamin D deficiency - 19 on admission. Start 2000 U     GI - Opiate induced constipation. Start Miralax daily and Senna-Docusate    DVT Ppx - Patient with severe anemia. Currently on ASA 81 mg    Dispo - Extension to 8/8/20 for increased mobility and ROM    Total time:  36 minutes.  I spent greater than 50% of the time for patient care, counseling, and coordination on this date, including unit/floor time, and face-to-face time with the patient as per interval events and assessment and plan above. Topics discussed included discharge planning, opiate discussion again, follow-up Dr. Gutierrez, and extension. Patient was discussed separately in IDT today; please see details above.    Keaton Pisano M.D.

## 2020-08-07 PROCEDURE — 770010 HCHG ROOM/CARE - REHAB SEMI PRIVAT*

## 2020-08-07 PROCEDURE — 97112 NEUROMUSCULAR REEDUCATION: CPT

## 2020-08-07 PROCEDURE — 97116 GAIT TRAINING THERAPY: CPT

## 2020-08-07 PROCEDURE — 700102 HCHG RX REV CODE 250 W/ 637 OVERRIDE(OP): Performed by: PHYSICAL MEDICINE & REHABILITATION

## 2020-08-07 PROCEDURE — 97535 SELF CARE MNGMENT TRAINING: CPT | Mod: CO

## 2020-08-07 PROCEDURE — 97110 THERAPEUTIC EXERCISES: CPT

## 2020-08-07 PROCEDURE — 97140 MANUAL THERAPY 1/> REGIONS: CPT | Mod: CO

## 2020-08-07 PROCEDURE — 99233 SBSQ HOSP IP/OBS HIGH 50: CPT | Performed by: PHYSICAL MEDICINE & REHABILITATION

## 2020-08-07 PROCEDURE — A9270 NON-COVERED ITEM OR SERVICE: HCPCS | Performed by: PHYSICAL MEDICINE & REHABILITATION

## 2020-08-07 PROCEDURE — 97530 THERAPEUTIC ACTIVITIES: CPT | Mod: CO

## 2020-08-07 RX ORDER — ZOLPIDEM TARTRATE 5 MG/1
5 TABLET ORAL NIGHTLY PRN
Qty: 30 TAB | Refills: 0 | Status: SHIPPED | OUTPATIENT
Start: 2020-08-07 | End: 2020-09-06 | Stop reason: SDUPTHER

## 2020-08-07 RX ORDER — FERROUS SULFATE 325(65) MG
325 TABLET ORAL 2 TIMES DAILY WITH MEALS
Qty: 60 TAB | Refills: 0 | Status: SHIPPED | OUTPATIENT
Start: 2020-08-07 | End: 2023-01-01

## 2020-08-07 RX ORDER — ASPIRIN 81 MG/1
81 TABLET ORAL DAILY
Qty: 90 TAB | Refills: 2 | Status: SHIPPED | OUTPATIENT
Start: 2020-08-07 | End: 2022-01-31

## 2020-08-07 RX ORDER — GABAPENTIN 300 MG/1
300 CAPSULE ORAL 3 TIMES DAILY
Qty: 90 CAP | Refills: 1 | Status: SHIPPED | OUTPATIENT
Start: 2020-08-07 | End: 2020-11-03 | Stop reason: SDUPTHER

## 2020-08-07 RX ORDER — ASCORBIC ACID 500 MG
500 TABLET ORAL 2 TIMES DAILY WITH MEALS
Qty: 60 TAB | Refills: 0 | Status: SHIPPED | OUTPATIENT
Start: 2020-08-07 | End: 2023-01-01

## 2020-08-07 RX ORDER — MORPHINE SULFATE 15 MG/1
15 TABLET ORAL EVERY 6 HOURS PRN
Qty: 42 TAB | Refills: 0 | Status: SHIPPED | OUTPATIENT
Start: 2020-08-07 | End: 2020-08-14

## 2020-08-07 RX ORDER — PREGABALIN 150 MG/1
150 CAPSULE ORAL 3 TIMES DAILY
Qty: 90 CAP | Refills: 1 | Status: SHIPPED | OUTPATIENT
Start: 2020-08-07 | End: 2020-09-02

## 2020-08-07 RX ADMIN — MORPHINE SULFATE 15 MG: 15 TABLET ORAL at 15:14

## 2020-08-07 RX ADMIN — OMEPRAZOLE 20 MG: 20 CAPSULE, DELAYED RELEASE ORAL at 07:57

## 2020-08-07 RX ADMIN — Medication 500 MG: at 07:57

## 2020-08-07 RX ADMIN — ZOLPIDEM TARTRATE 5 MG: 5 TABLET ORAL at 23:20

## 2020-08-07 RX ADMIN — POLYETHYLENE GLYCOL 3350 1 PACKET: 17 POWDER, FOR SOLUTION ORAL at 08:02

## 2020-08-07 RX ADMIN — MORPHINE SULFATE 15 MG: 15 TABLET ORAL at 04:10

## 2020-08-07 RX ADMIN — MORPHINE SULFATE 15 MG: 15 TABLET ORAL at 20:18

## 2020-08-07 RX ADMIN — ASPIRIN 81 MG: 81 TABLET, COATED ORAL at 07:57

## 2020-08-07 RX ADMIN — GABAPENTIN 300 MG: 300 CAPSULE ORAL at 20:18

## 2020-08-07 RX ADMIN — Medication 2000 UNITS: at 07:57

## 2020-08-07 RX ADMIN — MORPHINE SULFATE 15 MG: 15 TABLET ORAL at 18:30

## 2020-08-07 RX ADMIN — TRAMADOL HYDROCHLORIDE 50 MG: 50 TABLET, COATED ORAL at 12:57

## 2020-08-07 RX ADMIN — GABAPENTIN 300 MG: 300 CAPSULE ORAL at 15:14

## 2020-08-07 RX ADMIN — MORPHINE SULFATE 15 MG: 15 TABLET ORAL at 07:59

## 2020-08-07 RX ADMIN — Medication 500 MG: at 17:26

## 2020-08-07 RX ADMIN — NICOTINE 7 MG: 7 PATCH, EXTENDED RELEASE TRANSDERMAL at 17:26

## 2020-08-07 RX ADMIN — MORPHINE SULFATE 15 MG: 15 TABLET, FILM COATED, EXTENDED RELEASE ORAL at 20:18

## 2020-08-07 RX ADMIN — GABAPENTIN 300 MG: 300 CAPSULE ORAL at 07:57

## 2020-08-07 RX ADMIN — MORPHINE SULFATE 15 MG: 15 TABLET ORAL at 03:17

## 2020-08-07 RX ADMIN — MORPHINE SULFATE 15 MG: 15 TABLET ORAL at 12:00

## 2020-08-07 RX ADMIN — DOCUSATE SODIUM 50 MG AND SENNOSIDES 8.6 MG 2 TABLET: 8.6; 5 TABLET, FILM COATED ORAL at 20:18

## 2020-08-07 RX ADMIN — PREGABALIN 150 MG: 150 CAPSULE ORAL at 07:57

## 2020-08-07 RX ADMIN — TRAMADOL HYDROCHLORIDE 50 MG: 50 TABLET, COATED ORAL at 07:22

## 2020-08-07 RX ADMIN — DOCUSATE SODIUM 50 MG AND SENNOSIDES 8.6 MG 2 TABLET: 8.6; 5 TABLET, FILM COATED ORAL at 07:56

## 2020-08-07 RX ADMIN — TRAMADOL HYDROCHLORIDE 50 MG: 50 TABLET, COATED ORAL at 17:26

## 2020-08-07 RX ADMIN — PREGABALIN 150 MG: 150 CAPSULE ORAL at 20:18

## 2020-08-07 RX ADMIN — MORPHINE SULFATE 15 MG: 15 TABLET, FILM COATED, EXTENDED RELEASE ORAL at 07:57

## 2020-08-07 RX ADMIN — FERROUS SULFATE TAB 325 MG (65 MG ELEMENTAL FE) 325 MG: 325 (65 FE) TAB at 07:57

## 2020-08-07 RX ADMIN — MAGNESIUM HYDROXIDE 30 ML: 400 SUSPENSION ORAL at 20:19

## 2020-08-07 RX ADMIN — PREGABALIN 150 MG: 150 CAPSULE ORAL at 15:13

## 2020-08-07 RX ADMIN — FERROUS SULFATE TAB 325 MG (65 MG ELEMENTAL FE) 325 MG: 325 (65 FE) TAB at 17:26

## 2020-08-07 ASSESSMENT — ACTIVITIES OF DAILY LIVING (ADL)
TOILETING_LEVEL_OF_ASSIST: ABLE TO COMPLETE TOILETING WITHOUT ASSIST
TUB_SHOWER_TRANSFER_DESCRIPTION: GRAB BAR
TOILET_TRANSFER_DESCRIPTION: GRAB BAR
TOILET_TRANSFER_LEVEL_OF_ASSIST: ABLE TO COMPLETE TOILET TRANSFER WITHOUT ASSIST

## 2020-08-07 ASSESSMENT — GAIT ASSESSMENTS: GAIT LEVEL OF ASSIST: MODIFIED INDEPENDENT

## 2020-08-07 NOTE — PROGRESS NOTES
Received patient during shift change, report rec'd from day shift RN. Resting in bed, VS stable on room air. Continent of B&B, Mod-I on unit using FWW for transfers. A&O x 4, able to make needs known. Bed in low position, call light within reach.

## 2020-08-07 NOTE — DISCHARGE PLANNING
Case Management Discharge Instructions        Discharge Location: Home with Home Health     Agency Name: [facility]   Home Health: Registered Nurse, Occupational Therapist, Physical Therapist  Comments: Home health will call to set up initial appointment     DME Provider / Phone: no additional equipment needed        Follow-up Information:    Gerardo Gutierrez M.D.  [phone]  Physiatry    [address]    [place] [address]   Wednesday 8.12.2020, appointment at 4:40pm      MARTÍN Turner  [phone]  Primary Care Provider    [address]    [facility] [address]   Thursday 8.13.2020, check in at 11:05am for 11:20am appointment.     Temo Pires M.D.  [phone]  Orthopaedics    [address] [facility] [address]   Tuesday 8.18.2020, 8:15am appointment

## 2020-08-07 NOTE — PROGRESS NOTES
Overall improving, still plenty of pain.  Requiring regular dosing with extended release and very concerned about pain medication after discharge since Dr. Gutierrez has told him anything over and above his maintenance dose needs to be prescribed by me.    His incision looks clean and dry.  Soft tissue is very tender to touch but no redness or drainage.    ROM is aout 0-60, but he gets to 90 in PT.  Ambulates slowly but knee is stable.    He is being discharged tomorrow.    I am prescribing Extended Release Morphine 15mg BID prn for 3 weeks and his wife can pick it up or we can send it to him.    He should see me in the office in about a month.

## 2020-08-07 NOTE — PROGRESS NOTES
"Rehab Progress Note     Encounter Date: 8/7/2020    CC: right knee pain, sacral pain, chronic pain    Interval Events (Subjective)  Patient sitting up in room. He reports therapy is going well. He is looking forward to going home. He reports he will need a refill until his appointment. He reports he had to get a 7 day supply prior to his surgery as well. Discussed will repeat 7 day supply of Morphine IR. Discussed opiate counseling. Orthopedics to write long acting, discussed about patient and his contract as well as follow-up with Dr. Gutierrez.      IDT Team Meeting 8/6/2020  DC/Disposition:  8/8/20    Objective:  VITAL SIGNS: /71   Pulse 77   Temp 36.9 °C (98.4 °F) (Oral)   Resp 16   Ht 1.803 m (5' 11\")   Wt 101.6 kg (224 lb)   SpO2 98%   BMI 31.24 kg/m²   Gen: NAD  Psych: Mood and affect appropriate  CV: RRR, no edema  Resp: CTAB, no upper airway sounds  Abd: NTND  Neuro: AOx4, 5/5 BUE    No results found for this or any previous visit (from the past 72 hour(s)).    Current Facility-Administered Medications   Medication Frequency   • gabapentin (NEURONTIN) capsule 300 mg TID   • polyethylene glycol/lytes (MIRALAX) PACKET 1 Packet DAILY    And   • senna-docusate (PERICOLACE or SENOKOT S) 8.6-50 MG per tablet 2 Tab BID    And   • magnesium hydroxide (MILK OF MAGNESIA) suspension 30 mL QDAY PRN    And   • bisacodyl (DULCOLAX) suppository 10 mg QDAY PRN   • bacitracin-polymyxin b (POLYSPORIN) 500-45359 UNIT/GM ointment PRN   • carboxymethylcellulose 1 % ophth soln 1 Drop PRN   • morphine ER (MS CONTIN) tablet 15 mg Q12HRS   • ferrous sulfate tablet 325 mg BID WITH MEALS   • ascorbic acid tablet 500 mg BID WITH MEALS   • vitamin D (cholecalciferol) tablet 2,000 Units DAILY   • Respiratory Therapy Consult Continuous RT   • tramadol (ULTRAM) 50 MG tablet 50 mg Q4HRS PRN   • hydrALAZINE (APRESOLINE) tablet 25 mg Q8HRS PRN   • acetaminophen (TYLENOL) tablet 650 mg Q4HRS PRN   • benzocaine-menthol (CEPACOL) " lozenge 1 Lozenge Q2HRS PRN   • mag hydrox-al hydrox-simeth (MAALOX PLUS ES or MYLANTA DS) suspension 20 mL Q2HRS PRN   • ondansetron (ZOFRAN ODT) dispertab 4 mg 4X/DAY PRN    Or   • ondansetron (ZOFRAN) syringe/vial injection 4 mg 4X/DAY PRN   • traZODone (DESYREL) tablet 50 mg QHS PRN   • sodium chloride (OCEAN) 0.65 % nasal spray 2 Spray PRN   • zolpidem (AMBIEN) tablet 5 mg HS PRN   • aspirin EC (ECOTRIN) tablet 81 mg DAILY   • losartan (COZAAR) tablet 25 mg DAILY   • morphine (MS IR) tablet 15 mg Q6HRS PRN   • morphine (MS IR) tablet 15 mg Q6HRS   • nicotine (NICODERM) 7 MG/24HR 7 mg Q24HRS   • omeprazole (PRILOSEC) capsule 20 mg DAILY   • pregabalin (LYRICA) capsule 150 mg TID       Orders Placed This Encounter   Procedures   • Diet Order Regular     Standing Status:   Standing     Number of Occurrences:   1     Order Specific Question:   Diet:     Answer:   Regular [1]       Assessment:  Active Hospital Problems    Diagnosis   • *S/P total knee arthroplasty, right   • Acute blood loss anemia   • Atrial fibrillation with rapid ventricular response (HCC)   • Paroxysmal atrial fibrillation (HCC)   • Pubic ramus fracture (HCC)   • Essential hypertension   • Chronic pain of right knee   • Rheumatoid arthritis (HCC)   • Anxiety disorder       Medical Decision Making and Plan:  R Knee replacement - Patient s/p right knee replacement with Dr. Pires for severe valgus deformity and insufficient MCL on 7/22/20. Post-operative course complicated by severe anemia.  -PT and OT for mobility and ADLs  -R knee immobilizer PRN. WBAT.  Needs to do more ROM at knee      RA - Multiple joint involvement. Previously on Xeljanz      Anemia - Severe post-operative anemia. Check AM CBC 8.6. Fe panel at [facility] with low Iron  -Start Fe supplement BID and Vitamin C. Recheck on 8/4/20 - 9.9, improving on Fe supplement  -Continue at discharge    HTN/ Afib -  Patient on Losartan 25 mg daily.  Elevated > 140 x1. Being held due to hold  parameters set high. Changed to < 100     GERD - PPI on transfer.      Chronic pain - Patient on Tylenol 1000 mg QID, Morphine 15 mg q6h, Lyrica 150 mg TID  -Continue to have break through pain. MS Contin 15 mg BID to have baseline. Discussed about weaning off at 7-10 days post-operative  -Worsening neuropathic pain in peroneal/superficial peroneal distribution - Gabapentin 300 mg TID  -Follow-up with Dr. Gutierrez. Morphine IR for 7 days  I discussed the risks and benefits of using opiate medications for pain control.  I discussed the risk of addiction, potential for overdose, and respiratory depression (and the potential need for opiate antagonist therapy if this occurs).  I encouraged the patient to take this medication sparingly with the expressed goal of weaning off the medication as soon as is clinically appropriate.  I informed the patient that we are only able to provide a 14 day supply of these medications at discharge and that they will be responsible for requesting any refills needed from their primary care provider or their surgeon.  We discussed the need to safely secure these medications to prevent theft, inadvertent ingestion, or misuse.  Any unused medication should be immediately disposed of through a sanctioned medication disposal program.  We discussed adjunctive pain medications and conservative therapies at length. I answered the patient's questions regarding this treatment, and the patient indicated understanding and willingness to proceed.     Tobacco abuse -  Currently on Nicotine patch. Counseling 8/5/20      Vitamin D deficiency - 19 on admission. Start 2000 U     GI - Opiate induced constipation. Start Miralax daily and Senna-Docusate    DVT Ppx - Patient with severe anemia. Currently on ASA 81 mg    Dispo - Extension to 8/8/20 for increased mobility and ROM    Total time:  36 minutes.  I spent greater than 50% of the time for patient care, counseling, and coordination on this date,  including unit/floor time, and face-to-face time with the patient as per interval events and assessment and plan above. Topics discussed included discharge planning, opiate counseling, and continue ASA as well as Fe supplement.   Keaton Pisano M.D.

## 2020-08-07 NOTE — CARE PLAN
Problem: PT-Long Term Goals  Goal: LTG-By discharge, patient will ambulate  Description: 1) Individualized goal:  Patient will amb >50 ft indoors mod I with FWW  2) Interventions:  PT Gait Training, PT Self Care/Home Eval, PT Therapeutic Exercises, PT Neuro Re-Ed/Balance, PT Aquatic Therapy, PT Therapeutic Activity, PT Manual Therapy, and PT Evaluation    Outcome: MET  Goal: LTG-By discharge, patient will transfer one surface to another  Description: 1) Individualized goal: Patient will transfer Mod I with FWW STS/SPT   2) Interventions:  PT Gait Training, PT Self Care/Home Eval, PT Therapeutic Exercises, PT Neuro Re-Ed/Balance, PT Aquatic Therapy, PT Therapeutic Activity, PT Manual Therapy, and PT Evaluation    Outcome: MET     Problem: PT-Long Term Goals  Goal: LTG-By discharge, patient will ambulate up/down 4-6 stairs  Description: 1) Individualized goal:  Patient will amb up/down 2 standard stairs with no railing CGA  2) Interventions:  PT Gait Training, PT Self Care/Home Eval, PT Therapeutic Exercises, PT Neuro Re-Ed/Balance, PT Aquatic Therapy, PT Therapeutic Activity, PT Manual Therapy, and PT Evaluation    Outcome: DISCHARGED-GOAL NOT MET  Note: Requires min A for DME management

## 2020-08-07 NOTE — CARE PLAN
Problem: Safety  Goal: Will remain free from injury  Description: Pt uses call light consistently and appropriately. Waits for assistance does not attempt self transfer this shift. Able to verbalize needs.   Outcome: PROGRESSING AS EXPECTED     Problem: Infection  Goal: Will remain free from infection  Outcome: PROGRESSING AS EXPECTED     Problem: Pain Management  Goal: Pain level will decrease to patient's comfort goal  Description: Pt able to participate in therapies and activities this shift.   Outcome: PROGRESSING AS EXPECTED

## 2020-08-07 NOTE — THERAPY
Recreational Therapy  Daily Treatment     Patient Name: Richard Hubbard II  AGE:  45 y.o., SEX:  male  Medical Record #: [id number]  Today's Date: 8/7/2020       Subjective    Pt ready and willing for session.      Objective       08/07/20 1431   Functional Ability Status - Cognitive   Attention Span Remains on Task   Comprehension Follows Three Step Commands   Judgment Able to Make Independent Decisions   Functional Ability Status - Emotional    Affect Appropriate   Mood Appropriate   Behavior Appropriate   Skilled Intervention    Skilled Intervention Relaxation / Coping Skills;Gross Motor Leisure   Skilled Intervention Comments Sloan valentin.    Interdisciplinary Plan of Care Collaboration   Patient Position at End of Therapy   (Mod I on unit with FWW)   Strengths & Barriers   Strengths Able to follow instructions;Adequate strength;Good balance;Motivated for self care and independence;Pleasant and cooperative;Supportive family;Willingly participates in therapeutic activities   Barriers Decreased endurance;Impaired activity tolerance   Treatment Time   Total Time Spent (mins) 30   Procedural Tracking   Procedural Tracking Leisure Skills Development       Assessment    Pt walked 150feet X2 with 3 rest breaks to the family lounge.     Strengths: (P) Able to follow instructions, Adequate strength, Good balance, Motivated for self care and independence, Pleasant and cooperative, Supportive family, Willingly participates in therapeutic activities  Barriers: (P) Decreased endurance, Impaired activity tolerance    Plan    Discharge Pt.

## 2020-08-07 NOTE — DISCHARGE SUMMARY
Rehab Discharge Summary    Admission Date: 7/27/2020    Discharge Date: 8/8/2020     Attending Provider: Marcos Henderson MD    Admission Diagnosis:   Active Hospital Problems    Diagnosis   • *S/P total knee arthroplasty, right   • Acute blood loss anemia   • Atrial fibrillation with rapid ventricular response (HCC)   • Paroxysmal atrial fibrillation (HCC)   • Pubic ramus fracture (HCC)   • Essential hypertension   • Chronic pain of right knee   • Rheumatoid arthritis (HCC)   • Anxiety disorder       Discharge Diagnosis:  Active Hospital Problems    Diagnosis   • *S/P total knee arthroplasty, right   • Acute blood loss anemia   • Atrial fibrillation with rapid ventricular response (HCC)   • Paroxysmal atrial fibrillation (HCC)   • Pubic ramus fracture (HCC)   • Essential hypertension   • Chronic pain of right knee   • Rheumatoid arthritis (HCC)   • Anxiety disorder       HPI per H&P:  Patient is a 45 y.o. male with a past medical history significant for BPD, GERD, MONICA, A fib and severe RA admitted to [facility] on 7/22/2020 11:13 AM with scheduled knee procedure.  Per report he had severe valgus deformity requiring right knee replacement. Patient underwent procedure on 7/22/20 with Dr. Pires with EBL of 250 mL. Patient was made WBAT with right knee in immobilizer. Post-operatively patient's Hgb dropped to 7.2 without signs or symptoms. Patient had a history of GI bleed. Patient's vitals have remained stable.  Patient has been transitioned off of PCA to scheduled morphine.  Most recent Hgb was 8.8 on 7/26/20.      Patient was last evaluated by OT on 7/25/20 and was modA for ADLs. Patient was last evaluated by PT on 7/25/20 and was Adore for mobility. Patient was previously living in a 1 story home with 2 steps to enter.    Patient was admitted to [facility] on 7/27/2020.     Hospital Course by Problem List:  R Knee replacement - Patient s/p right knee replacement with Dr. Pires for  severe valgus deformity and insufficient MCL on 7/22/20. Post-operative course complicated by severe anemia. Patient underwent acute inpatient rehabilitation from 7/27/20 to 8/8/20 with good improvement in mobility and ADLs.   -R knee immobilizer PRN. WBAT.  Needs to do more ROM at knee      RA - Multiple joint involvement. Previously on Xeljanz      Anemia - Severe post-operative anemia. Check AM CBC 8.6. Fe panel at [facility] with low Iron. Start Fe supplement BID and Vitamin C. Recheck on 8/4/20 - 9.9, improving on Fe supplement  -Continue supplement at discharge     HTN/ Afib -  Patient on Losartan 25 mg daily.  Elevated > 140 x1. Being held due to hold parameters set high. Changed to < 100     GERD - PPI on transfer.      Chronic pain - Patient on Tylenol 1000 mg QID, Morphine 15 mg q6h, Lyrica 150 mg TID  -Continue to have break through pain. MS Contin 15 mg BID to have baseline. Discussed about weaning off at 7-10 days post-operative.  Discussed that he has been taking MS IR 15mg q6h as an outpatient and that he has had MS Contin 15mg po bid.  We discussed that he will take his MS IR 15mg up to 6/day after discharge with plan to wean this or change outpatient dosing.   -Worsening neuropathic pain in peroneal/superficial peroneal distribution - Gabapentin 300 mg TID  -Follow-up with Dr. Gutierrez. Morphine IR for 7 days  I discussed the risks and benefits of using opiate medications for pain control.  I discussed the risk of addiction, potential for overdose, and respiratory depression (and the potential need for opiate antagonist therapy if this occurs).  I encouraged the patient to take this medication sparingly with the expressed goal of weaning off the medication as soon as is clinically appropriate.  I informed the patient that we are only able to provide a 14 day supply of these medications at discharge and that they will be responsible for requesting any refills needed from their primary care provider or their  surgeon.  We discussed the need to safely secure these medications to prevent theft, inadvertent ingestion, or misuse.  Any unused medication should be immediately disposed of through a sanctioned medication disposal program.  We discussed adjunctive pain medications and conservative therapies at length. I answered the patient's questions regarding this treatment, and the patient indicated understanding and willingness to proceed.     Tobacco abuse -  Currently on Nicotine patch. Counseling 8/5/20      Vitamin D deficiency - 19 on admission. Start 2000 U      GI - Opiate induced constipation. Start Miralax daily and Senna-Docusate     DVT Ppx - Patient with severe anemia. Currently on ASA 81 mg     Dispo - Extension to 8/8/20 for increased mobility and ROM    Functional Status at Discharge  Eating:  Independent  Eating Description:     Grooming:  Independent  Grooming Description:  Increased time  Bathing:  Modified Independent  Bathing Description:  Grab bar, Hand held shower, Long handled bath tool, Tub bench  Upper Body Dressing:  Independent  Upper Body Dressing Description:  Set-up of equipment  Lower Body Dressing:  Modified Independent  Lower Body Dressing Description:  Dressing stick, Reacher, Shoe horn, Sock aid  Discharge Location : Home  Patient Discharging with Assist of: Family ;Spouse / Significant Other  Level of Supervision Required: No Supervision  Recommended Equipment for Discharge: Grab Bars in Tub / Shower  Recommended Services Upon Discharge: Home Health Occupational Therapy;Outpatient Occupational Therapy  Long Term Goals Met: mod I basic self care and related  bathroom transfers   Criteria for Termination of Services: Maximum Function Achieved for Inpatient Rehabilitation  Walk:  Modified Independent  Distance Walked:  200  Number of Times Distance Was Traveled:  1  Assistive Device:  Front Wheel Walker  Gait Deviation:  Antalgic, Bradykinetic  Wheelchair:  Supervised  Distance Propelled:  50    Wheelchair Description:  Limited by fatigue  Stairs Contact Guard Assist  Stairs Description Extra time, Hand rails, Requires incidental assist, Safety concerns, Verbal cueing(LLE on ascent; RLE on descent. Also able to side step)  Discharge Location: Home  Patient Discharging with Assist of: Family;Spouse / Significant Other  Level of Supervision Required Upon Discharge: No Supervision  Recommended Equipment for Discharge: Front-Wheeled Walker  Recommeded Services Upon Discharge: Home Health Physical Therapy  Long Term Goals Met: 2  Long Term Goals Not Met: 1  Reason(s) for Goals Not Met: pt requires min A for safety on steps  Criteria for Termination of Services: Maximum Function Achieved for Inpatient Rehabilitation  Comprehension:  Independent  Comprehension Description:     Expression:  Independent  Expression Description:     Social Interaction:     Social Interaction Description:     Problem Solving:     Problem Solving Description:     Memory:     Memory Description:          Marcos DREW MD, personally performed a complete drug regimen review and no potential clinically significant medication issues were identified.   Discharge Medication:     Medication List      START taking these medications      Instructions   ascorbic acid 500 MG tablet  Commonly known as: VITAMIN C   Take 1 Tab by mouth 2 times a day, with meals.  Dose: 500 mg     Cholecalciferol 2000 UNIT Tabs  Start taking on: August 8, 2020   Take 1 Tab by mouth every day.  Dose: 2,000 Units     ferrous sulfate 325 (65 Fe) MG tablet   Take 1 Tab by mouth 2 times a day, with meals.  Dose: 325 mg     gabapentin 300 MG Caps  Commonly known as: NEURONTIN   Take 1 Cap by mouth 3 times a day.  Dose: 300 mg     zolpidem 5 MG Tabs  Commonly known as: AMBIEN   Take 1 Tab by mouth at bedtime as needed for up to 30 days.  Dose: 5 mg        CHANGE how you take these medications      Instructions   Naloxone 4 MG/0.1ML Liqd  What changed:   · how much to  take  · how to take this  · when to take this  · reasons to take this  Commonly known as: NARCAN   One spray in one nostril for overdose and call 911.        CONTINUE taking these medications      Instructions   acetaminophen 500 MG Tabs  Commonly known as: TYLENOL   Take 500-1,000 mg by mouth every 6 hours as needed.  Dose: 500-1,000 mg     aspirin 81 MG EC tablet   Take 1 Tab by mouth every day.  Dose: 81 mg     docusate sodium 100 MG Caps   Take 100 mg by mouth 2 Times a Day.  Dose: 100 mg     losartan 25 MG Tabs  Commonly known as: COZAAR   Take 1 Tab by mouth every day.  Dose: 25 mg     morphine 15 MG tablet  Commonly known as: MS IR   Take 1 Tab by mouth every 6 hours as needed for up to 7 days.  Dose: 15 mg     nicotine 7 MG/24HR Pt24  Commonly known as: NICODERM   Apply 1 Patch to skin as directed every 24 hours.  Dose: 1 Patch     omeprazole 40 MG delayed-release capsule  Commonly known as: PRILOSEC   Take 1 Cap by mouth every day.  Dose: 40 mg     pregabalin 150 MG Caps  Commonly known as: LYRICA   Take 1 Cap by mouth 3 times a day for 30 days.  Dose: 150 mg        STOP taking these medications    polyethylene glycol/lytes 17 g Pack  Commonly known as: MIRALAX            Discharge Diet:  Regular    Discharge Activity:  As tolerated     Disposition:  Patient to discharge home with family support and community resources.     Equipment:  Has equipment    Follow-up & Discharge Instructions:  Follow up with your primary care provider (PCP) within 7-10 days of discharge to review your medications and take over your care.     If you develop chest pain, fever, chills, change in neurologic function (weakness, sensation changes, vision changes), or other concerning sxs, seek immediate medical attention or call 911.      Condition on Discharge:  Good    More than 35 minutes was spent on discharging this patient, including face-to-face time, prescription management, and the dictation of this note.    Marcos Henderson,  MD    Date of Service: 8/8/2020

## 2020-08-07 NOTE — DISCHARGE SUMMARY
Rehab Discharge Summary    Admission Date: 7/27/2020    Discharge Date: 8/7/2020    Attending Provider: Keaton Pisano MD/PhD    Admission Diagnosis:   Active Hospital Problems    Diagnosis   • *S/P total knee arthroplasty, right   • Acute blood loss anemia   • Atrial fibrillation with rapid ventricular response (HCC)   • Paroxysmal atrial fibrillation (HCC)   • Pubic ramus fracture (HCC)   • Essential hypertension   • Chronic pain of right knee   • Rheumatoid arthritis (HCC)   • Anxiety disorder       Discharge Diagnosis:  Active Hospital Problems    Diagnosis   • *S/P total knee arthroplasty, right   • Acute blood loss anemia   • Atrial fibrillation with rapid ventricular response (HCC)   • Paroxysmal atrial fibrillation (HCC)   • Pubic ramus fracture (HCC)   • Essential hypertension   • Chronic pain of right knee   • Rheumatoid arthritis (HCC)   • Anxiety disorder       HPI per H&P:  Patient is a 45 y.o. male with a past medical history significant for BPD, GERD, MONICA, A fib and severe RA admitted to [facility] on 7/22/2020 11:13 AM with scheduled knee procedure.  Per report he had severe valgus deformity requiring right knee replacement. Patient underwent procedure on 7/22/20 with Dr. Pires with EBL of 250 mL. Patient was made WBAT with right knee in immobilizer. Post-operatively patient's Hgb dropped to 7.2 without signs or symptoms. Patient had a history of GI bleed. Patient's vitals have remained stable.  Patient has been transitioned off of PCA to scheduled morphine.  Most recent Hgb was 8.8 on 7/26/20.      Patient was last evaluated by OT on 7/25/20 and was modA for ADLs. Patient was last evaluated by PT on 7/25/20 and was Adore for mobility. Patient was previously living in a 1 story home with 2 steps to enter.    Patient was admitted to [facility] on 7/27/2020.     Hospital Course by Problem List:  R Knee replacement - Patient s/p right knee replacement with   Lexis for severe valgus deformity and insufficient MCL on 7/22/20. Post-operative course complicated by severe anemia. Patient underwent acute inpatient rehabilitation from 7/27/20 to 8/7/20 with good improvement in mobility and ADLs.   -R knee immobilizer PRN. WBAT.  Needs to do more ROM at knee      RA - Multiple joint involvement. Previously on Xeljanz      Anemia - Severe post-operative anemia. Check AM CBC 8.6. Fe panel at [facility] with low Iron. Start Fe supplement BID and Vitamin C. Recheck on 8/4/20 - 9.9, improving on Fe supplement  -Continue supplement at discharge     HTN/ Afib -  Patient on Losartan 25 mg daily.  Elevated > 140 x1. Being held due to hold parameters set high. Changed to < 100     GERD - PPI on transfer.      Chronic pain - Patient on Tylenol 1000 mg QID, Morphine 15 mg q6h, Lyrica 150 mg TID  -Continue to have break through pain. MS Contin 15 mg BID to have baseline. Discussed about weaning off at 7-10 days post-operative  -Worsening neuropathic pain in peroneal/superficial peroneal distribution - Gabapentin 300 mg TID  -Follow-up with Dr. Gutierrez. Morphine IR for 7 days  I discussed the risks and benefits of using opiate medications for pain control.  I discussed the risk of addiction, potential for overdose, and respiratory depression (and the potential need for opiate antagonist therapy if this occurs).  I encouraged the patient to take this medication sparingly with the expressed goal of weaning off the medication as soon as is clinically appropriate.  I informed the patient that we are only able to provide a 14 day supply of these medications at discharge and that they will be responsible for requesting any refills needed from their primary care provider or their surgeon.  We discussed the need to safely secure these medications to prevent theft, inadvertent ingestion, or misuse.  Any unused medication should be immediately disposed of through a sanctioned medication disposal program.   We discussed adjunctive pain medications and conservative therapies at length. I answered the patient's questions regarding this treatment, and the patient indicated understanding and willingness to proceed.     Tobacco abuse -  Currently on Nicotine patch. Counseling 8/5/20      Vitamin D deficiency - 19 on admission. Start 2000 U      GI - Opiate induced constipation. Start Miralax daily and Senna-Docusate     DVT Ppx - Patient with severe anemia. Currently on ASA 81 mg     Dispo - Extension to 8/8/20 for increased mobility and ROM     Functional Status at Discharge  Eating:  Independent  Eating Description:     Grooming:  Independent  Grooming Description:  Increased time  Bathing:  Modified Independent  Bathing Description:  Grab bar, Hand held shower, Long handled bath tool, Tub bench  Upper Body Dressing:  Independent  Upper Body Dressing Description:  Set-up of equipment  Lower Body Dressing:  Modified Independent  Lower Body Dressing Description:  Dressing stick, Reacher, Shoe horn, Sock aid  Discharge Location : Home  Patient Discharging with Assist of: Family ;Spouse / Significant Other  Level of Supervision Required: No Supervision  Recommended Equipment for Discharge: Grab Bars in Tub / Shower  Recommended Services Upon Discharge: Home Health Occupational Therapy;Outpatient Occupational Therapy  Long Term Goals Met: mod I basic self care and related  bathroom transfers   Criteria for Termination of Services: Maximum Function Achieved for Inpatient Rehabilitation  Walk:  Modified Independent  Distance Walked:  200  Number of Times Distance Was Traveled:  1  Assistive Device:  Front Wheel Walker  Gait Deviation:  Antalgic, Bradykinetic  Wheelchair:  Supervised  Distance Propelled:  50   Wheelchair Description:  Limited by fatigue  Stairs Contact Guard Assist  Stairs Description Extra time, Hand rails, Requires incidental assist, Safety concerns, Verbal cueing(LLE on ascent; RLE on descent. Also able to  side step)  Discharge Location: Home  Patient Discharging with Assist of: Family;Spouse / Significant Other  Level of Supervision Required Upon Discharge: No Supervision  Recommended Equipment for Discharge: Front-Wheeled Walker  Recommeded Services Upon Discharge: Home Health Physical Therapy  Long Term Goals Met: 2  Long Term Goals Not Met: 1  Reason(s) for Goals Not Met: pt requires min A for safety on steps  Criteria for Termination of Services: Maximum Function Achieved for Inpatient Rehabilitation  Comprehension:  Independent  Comprehension Description:     Expression:  Independent  Expression Description:     Social Interaction:     Social Interaction Description:     Problem Solving:     Problem Solving Description:     Memory:     Memory Description:          Keaton DREW M.D., personally performed a complete drug regimen review and no potential clinically significant medication issues were identified.   Discharge Medication:     Medication List      START taking these medications      Instructions   ascorbic acid 500 MG tablet  Commonly known as: VITAMIN C   Take 1 Tab by mouth 2 times a day, with meals.  Dose: 500 mg     Cholecalciferol 2000 UNIT Tabs  Start taking on: August 8, 2020   Take 1 Tab by mouth every day.  Dose: 2,000 Units     ferrous sulfate 325 (65 Fe) MG tablet   Take 1 Tab by mouth 2 times a day, with meals.  Dose: 325 mg     gabapentin 300 MG Caps  Commonly known as: NEURONTIN   Take 1 Cap by mouth 3 times a day.  Dose: 300 mg     zolpidem 5 MG Tabs  Commonly known as: AMBIEN   Take 1 Tab by mouth at bedtime as needed for up to 30 days.  Dose: 5 mg        CHANGE how you take these medications      Instructions   Naloxone 4 MG/0.1ML Liqd  What changed:   · how much to take  · how to take this  · when to take this  · reasons to take this  Commonly known as: NARCAN   One spray in one nostril for overdose and call 911.        CONTINUE taking these medications      Instructions    acetaminophen 500 MG Tabs  Commonly known as: TYLENOL   Take 500-1,000 mg by mouth every 6 hours as needed.  Dose: 500-1,000 mg     aspirin 81 MG EC tablet   Take 1 Tab by mouth every day.  Dose: 81 mg     docusate sodium 100 MG Caps   Take 100 mg by mouth 2 Times a Day.  Dose: 100 mg     losartan 25 MG Tabs  Commonly known as: COZAAR   Take 1 Tab by mouth every day.  Dose: 25 mg     morphine 15 MG tablet  Commonly known as: MS IR   Take 1 Tab by mouth every 6 hours as needed for up to 7 days.  Dose: 15 mg     nicotine 7 MG/24HR Pt24  Commonly known as: NICODERM   Apply 1 Patch to skin as directed every 24 hours.  Dose: 1 Patch     omeprazole 40 MG delayed-release capsule  Commonly known as: PRILOSEC   Take 1 Cap by mouth every day.  Dose: 40 mg     pregabalin 150 MG Caps  Commonly known as: LYRICA   Take 1 Cap by mouth 3 times a day for 30 days.  Dose: 150 mg        STOP taking these medications    polyethylene glycol/lytes 17 g Pack  Commonly known as: MIRALAX            Discharge Diet:  Regular    Discharge Activity:  As tolerated     Disposition:  Patient to discharge home with family support and community resources.     Equipment:  FWW    Follow-up & Discharge Instructions:  Follow up with your primary care provider (PCP) within 7-10 days of discharge to review your medications and take over your care.     If you develop chest pain, fever, chills, change in neurologic function (weakness, sensation changes, vision changes), or other concerning sxs, seek immediate medical attention or call 911.      Condition on Discharge:  Good    More than 34 minutes was spent on discharging this patient, including face-to-face time, prescription management, and the dictation of this note.    Keaton Pisano M.D.    Date of Service: 8/7/2020

## 2020-08-07 NOTE — THERAPY
"Occupational Therapy  Daily Treatment     Patient Name: Richard Hubbard II  Age:  45 y.o., Sex:  male  Medical Record #: [id number]  Today's Date: 8/7/2020     Precautions  Precautions: (P) Fall Risk, Weight Bearing As Tolerated Right Lower Extremity  Comments: No pelvic precautions, ok for stretching/ROM and strengthening    Safety   ADL Safety : Requires Supervision for Safety  Bathroom Safety: Requires Supervision for Safety  Comments: see below notes for ADL performance details.    Subjective    \" I thought I was supposed to just do regular therapy  Not a shower today.\"    explained to patient  Reassessing  Functional status in prep for d/c home 8-8-20  Objective       08/07/20 0831   Precautions   Precautions Fall Risk;Weight Bearing As Tolerated Right Lower Extremity   Functional Level of Assist   Eating Independent   Grooming Independent   Bathing Modified Independent   Bathing Description Grab bar;Hand held shower;Long handled bath tool;Tub bench   Upper Body Dressing Independent   Lower Body Dressing Modified Independent   Lower Body Dressing Description Dressing stick;Reacher;Shoe horn;Sock aid   Toileting Modified Independent   Bed, Chair, Wheelchair Transfer Modified Independent   Toilet Transfers Modified Independent   Toilet Transfer Description Grab bar  (FWW)   Tub / Shower Transfers Modified Independent   Tub Shower Transfer Description Grab bar  (FWW)   Interdisciplinary Plan of Care Collaboration   Patient Position at End of Therapy In Bed;Call Light within Reach;Tray Table within Reach   OT Total Time Spent   OT Individual Total Time Spent (Mins) 90   OT Charge Group   OT Self Care / ADL 4   OT Manual Therapy Technique 1   OT Therapy Activity 1    FWW  Bed <-> Bathroom   Mod I     MLD to right LE  Groin to ankle  X 15 minutes    Prepped  K Tape for HH PT or OT  To apply as needed at home.    problem solved where to install a grab in shower  if  He and spouse decide to do so.   Problem solved " safer setup of shower they decide not to install grab bar.  Solution  To place  Shower seat  In corner of stall and  Then patient could use ledge below 1/2 glass wall   As support for standing  With damp wash cloth under hand to prevent slipping of the hand.   Assessment    Improved independence with basic ADL and related mobility   Strengths: Able to follow instructions, Alert and oriented, Independent prior level of function, Effective communication skills, Pleasant and cooperative, Supportive family  Barriers: Fatigue, Decreased endurance, Generalized weakness, Impaired activity tolerance, Impaired balance, Pain    Plan    D/c home 8-8-20      Occupational Therapy Goals     Problem: IADL's     Dates: Start: 07/28/20       Goal: STG-Within one week, patient will access kitchen area     Dates: Start: 07/28/20       Description: 1) Individualized Goal:  Min assist with FWW    2) Interventions:  OT Self Care/ADL, OT Manual Ther Technique, OT Neuro Re-Ed/Balance, OT Sensory Int Techniques, OT Therapeutic Activity, OT Evaluation, and OT Therapeutic Exercise    Note:     Goal Note filed on 07/30/20 1102 by Wojciech Clements, C.O.T.A.    Not yet addressed                            Problem: OT Long Term Goals     Dates: Start: 07/28/20       Goal: LTG-By discharge, patient will complete basic self care tasks     Dates: Start: 07/28/20       Description: 1) Individualized Goal:  Mod I with AE as needed    2) Interventions:  OT Self Care/ADL, OT Manual Ther Technique, OT Neuro Re-Ed/Balance, OT Sensory Int Techniques, OT Therapeutic Activity, OT Evaluation, and OT Therapeutic Exercise          Goal: LTG-By discharge, patient will perform bathroom transfers     Dates: Start: 07/28/20       Description: 1) Individualized Goal:  Mod I   2) Interventions:  OT Self Care/ADL, OT Manual Ther Technique, OT Neuro Re-Ed/Balance, OT Sensory Int Techniques, OT Therapeutic Activity, OT Evaluation, and OT Therapeutic Exercise

## 2020-08-07 NOTE — CARE PLAN
Problem: OT Long Term Goals  Goal: LTG-By discharge, patient will complete basic self care tasks  Description: 1) Individualized Goal:  Mod I with AE as needed    2) Interventions:  OT Self Care/ADL, OT Manual Ther Technique, OT Neuro Re-Ed/Balance, OT Sensory Int Techniques, OT Therapeutic Activity, OT Evaluation, and OT Therapeutic Exercise  Outcome: MET  Goal: LTG-By discharge, patient will perform bathroom transfers  Description: 1) Individualized Goal:  Mod I   2) Interventions:  OT Self Care/ADL, OT Manual Ther Technique, OT Neuro Re-Ed/Balance, OT Sensory Int Techniques, OT Therapeutic Activity, OT Evaluation, and OT Therapeutic Exercise  Outcome: MET

## 2020-08-08 VITALS
WEIGHT: 224 LBS | TEMPERATURE: 97.7 F | SYSTOLIC BLOOD PRESSURE: 124 MMHG | HEART RATE: 74 BPM | BODY MASS INDEX: 31.36 KG/M2 | OXYGEN SATURATION: 97 % | DIASTOLIC BLOOD PRESSURE: 83 MMHG | HEIGHT: 71 IN | RESPIRATION RATE: 18 BRPM

## 2020-08-08 PROCEDURE — 700102 HCHG RX REV CODE 250 W/ 637 OVERRIDE(OP): Performed by: PHYSICAL MEDICINE & REHABILITATION

## 2020-08-08 PROCEDURE — 99239 HOSP IP/OBS DSCHRG MGMT >30: CPT | Performed by: PHYSICAL MEDICINE & REHABILITATION

## 2020-08-08 PROCEDURE — A9270 NON-COVERED ITEM OR SERVICE: HCPCS | Performed by: PHYSICAL MEDICINE & REHABILITATION

## 2020-08-08 RX ADMIN — MORPHINE SULFATE 15 MG: 15 TABLET ORAL at 12:09

## 2020-08-08 RX ADMIN — MORPHINE SULFATE 15 MG: 15 TABLET ORAL at 14:27

## 2020-08-08 RX ADMIN — TRAMADOL HYDROCHLORIDE 50 MG: 50 TABLET, COATED ORAL at 12:09

## 2020-08-08 RX ADMIN — PREGABALIN 150 MG: 150 CAPSULE ORAL at 14:27

## 2020-08-08 RX ADMIN — OMEPRAZOLE 20 MG: 20 CAPSULE, DELAYED RELEASE ORAL at 07:57

## 2020-08-08 RX ADMIN — MORPHINE SULFATE 15 MG: 15 TABLET ORAL at 03:18

## 2020-08-08 RX ADMIN — Medication 2000 UNITS: at 07:57

## 2020-08-08 RX ADMIN — POLYETHYLENE GLYCOL 3350 1 PACKET: 17 POWDER, FOR SOLUTION ORAL at 08:01

## 2020-08-08 RX ADMIN — MORPHINE SULFATE 15 MG: 15 TABLET ORAL at 06:06

## 2020-08-08 RX ADMIN — FERROUS SULFATE TAB 325 MG (65 MG ELEMENTAL FE) 325 MG: 325 (65 FE) TAB at 07:57

## 2020-08-08 RX ADMIN — MORPHINE SULFATE 15 MG: 15 TABLET ORAL at 07:58

## 2020-08-08 RX ADMIN — ASPIRIN 81 MG: 81 TABLET, COATED ORAL at 07:57

## 2020-08-08 RX ADMIN — GABAPENTIN 300 MG: 300 CAPSULE ORAL at 14:27

## 2020-08-08 RX ADMIN — ZOLPIDEM TARTRATE 5 MG: 5 TABLET ORAL at 00:11

## 2020-08-08 RX ADMIN — GABAPENTIN 300 MG: 300 CAPSULE ORAL at 07:57

## 2020-08-08 RX ADMIN — MAGNESIUM HYDROXIDE 30 ML: 400 SUSPENSION ORAL at 14:30

## 2020-08-08 RX ADMIN — MORPHINE SULFATE 15 MG: 15 TABLET, FILM COATED, EXTENDED RELEASE ORAL at 07:57

## 2020-08-08 RX ADMIN — Medication 500 MG: at 07:57

## 2020-08-08 RX ADMIN — TRAMADOL HYDROCHLORIDE 50 MG: 50 TABLET, COATED ORAL at 07:32

## 2020-08-08 RX ADMIN — DOCUSATE SODIUM 50 MG AND SENNOSIDES 8.6 MG 2 TABLET: 8.6; 5 TABLET, FILM COATED ORAL at 07:57

## 2020-08-08 RX ADMIN — PREGABALIN 150 MG: 150 CAPSULE ORAL at 07:57

## 2020-08-08 NOTE — CARE PLAN
Problem: Safety  Goal: Will remain free from injury  Description: Pt uses call light consistently and appropriately. Waits for assistance does not attempt self transfer this shift. Able to verbalize needs.   8/8/2020 0314 by Mahin Schwab R.N.  Outcome: PROGRESSING AS EXPECTED  8/8/2020 0313 by Mahin Schwab R.N.  Outcome: PROGRESSING AS EXPECTED  8/8/2020 0313 by Mahin Schwab R.N.  Outcome: PROGRESSING AS EXPECTED     Problem: Infection  Goal: Will remain free from infection  Outcome: PROGRESSING AS EXPECTED     Problem: Venous Thromboembolism (VTW)/Deep Vein Thrombosis (DVT) Prevention:  Goal: Patient will participate in Venous Thrombosis (VTE)/Deep Vein Thrombosis (DVT)Prevention Measures  8/8/2020 0314 by Mahin Schwab R.N.  Outcome: PROGRESSING AS EXPECTED  8/8/2020 0313 by Mahin Schwab R.N.  Outcome: PROGRESSING AS EXPECTED  8/8/2020 0313 by Mahin Schwab R.N.  Outcome: PROGRESSING AS EXPECTED

## 2020-08-08 NOTE — DISCHARGE PLANNING
"Met w/ patient to review IDT conference recommendations & DC planning in process.    Case management Summary:   Met with patient prior to discharge.   Reviewed all follow up appointments: physiatry, PCP & ortho.   Referral made to [facility] and they are have accepted referral and are ready to follow.    No additional equipment needed.     During hospitalization, I have provided support and education and have been available for questions and information during hours of operation, communicated with therapy team and MD along with providing links/resources  to outside services.    Patient verbalizes agreement with all plans and has an understanding of the next steps within the post acute services.     Individualized Goals:   1. Pain mgmt  2. For my knee to heal & get stronger    Outcome:   1. Goal met & completed: states \"I've finally had better pain mgmt here.\" F/U appt in place w/ physiatry for ongoing pain mgmt.  2. Goal partially met: patient has made improvement since rehab admit, but is not at baseline. It is anticipated he will continue to improve w/ OP mgmt: HH referral in place.      "

## 2020-08-08 NOTE — PROGRESS NOTES
Patient discharged to home per order.  Discharge instructions reviewed with patient and family; they verbalize understanding and signed copies placed in chart.  Patient has all belongings; signed copy of form in chart.  Patient left facility at 1445 via whellchair accompanied by rehab staff and family.  Have enjoyed working with this pleasant patient.

## 2020-08-08 NOTE — DISCHARGE INSTRUCTIONS
[facility] NURSING DISCHARGE INSTRUCTIONS    Blood Pressure:   Weight: 101.6 kg (224 lb)  Nursing recommendations for Richard Hubbard LM at time of discharge are as follows:  Client and Family Member verbalized understanding of all discharge instructions and prescriptions.     Review all your home medications and newly ordered medications with your doctor and/or pharmacist. Follow medication instructions as directed by your doctor and/or pharmacist.    Pain Management:   Discharge Pain Medication Instructions:  Comfort Goal: Perform Activity, Sleep Comfortably  Notify your primary care provider if pain is unrelieved with these measures, if the pain is new, or increased in intensity.    Discharge Skin Characteristics: Warm, Dry  Discharge Skin Exam:    Wound 07/22/20 Incision Knee Right right knee - aquacel and 6' ace, knee immobilizer (Active)   Wound Image   07/30/20 1744   Site Assessment Intact;Pink;Red 08/07/20 2015   Periwound Assessment Clean;Dry;Intact 08/07/20 2015   Margins Attached edges 08/07/20 2015   Closure None 08/07/20 2015   Drainage Amount None 08/07/20 2015   Drainage Description Tan 08/07/20 0715   Treatments Ice applied 08/01/20 2115   Wound Cleansing Approved Wound Cleanser 08/07/20 0715   Dressing Options Open to Air 08/07/20 2015   Dressing Changed Changed 08/06/20 2035   Dressing Status Clean;Dry;Intact 08/07/20 2015     Skin / Wound Care Instructions: Please contact your primary care physician for any change in skin integrity.     If You Have Surgical Incisions / Wounds:  Monitor surgical site(s) for signs of increased swelling, redness or symptoms of drainage from the site or fever as this could indicate signs and symptoms of infection. If these symptoms are noted, notifiy your primary care provider.      Fall Prevention in the Home, Adult  Falls can cause injuries. They can happen to people of all ages. There are many things you can do to make your home safe  and to help prevent falls. Ask for help when making these changes, if needed.  What actions can I take to prevent falls?  General Instructions  · Use good lighting in all rooms. Replace any light bulbs that burn out.  · Turn on the lights when you go into a dark area. Use night-lights.  · Keep items that you use often in easy-to-reach places. Lower the shelves around your home if necessary.  · Set up your furniture so you have a clear path. Avoid moving your furniture around.  · Do not have throw rugs and other things on the floor that can make you trip.  · Avoid walking on wet floors.  · If any of your floors are uneven, fix them.  · Add color or contrast paint or tape to clearly danish and help you see:  ? Any grab bars or handrails.  ? First and last steps of stairways.  ? Where the edge of each step is.  · If you use a stepladder:  ? Make sure that it is fully opened. Do not climb a closed stepladder.  ? Make sure that both sides of the stepladder are locked into place.  ? Ask someone to hold the stepladder for you while you use it.  · If there are any pets around you, be aware of where they are.  What can I do in the bathroom?         · Keep the floor dry. Clean up any water that spills onto the floor as soon as it happens.  · Remove soap buildup in the tub or shower regularly.  · Use non-skid mats or decals on the floor of the tub or shower.  · Attach bath mats securely with double-sided, non-slip rug tape.  · If you need to sit down in the shower, use a plastic, non-slip stool.  · Install grab bars by the toilet and in the tub and shower. Do not use towel bars as grab bars.  What can I do in the bedroom?  · Make sure that you have a light by your bed that is easy to reach.  · Do not use any sheets or blankets that are too big for your bed. They should not hang down onto the floor.  · Have a firm chair that has side arms. You can use this for support while you get dressed.  What can I do in the kitchen?  · Clean  up any spills right away.  · If you need to reach something above you, use a strong step stool that has a grab bar.  · Keep electrical cords out of the way.  · Do not use floor polish or wax that makes floors slippery. If you must use wax, use non-skid floor wax.  What can I do with my stairs?  · Do not leave any items on the stairs.  · Make sure that you have a light switch at the top of the stairs and the bottom of the stairs. If you do not have them, ask someone to add them for you.  · Make sure that there are handrails on both sides of the stairs, and use them. Fix handrails that are broken or loose. Make sure that handrails are as long as the stairways.  · Install non-slip stair treads on all stairs in your home.  · Avoid having throw rugs at the top or bottom of the stairs. If you do have throw rugs, attach them to the floor with carpet tape.  · Choose a carpet that does not hide the edge of the steps on the stairway.  · Check any carpeting to make sure that it is firmly attached to the stairs. Fix any carpet that is loose or worn.  What can I do on the outside of my home?  · Use bright outdoor lighting.  · Regularly fix the edges of walkways and driveways and fix any cracks.  · Remove anything that might make you trip as you walk through a door, such as a raised step or threshold.  · Trim any bushes or trees on the path to your home.  · Regularly check to see if handrails are loose or broken. Make sure that both sides of any steps have handrails.  · Install guardrails along the edges of any raised decks and porches.  · Clear walking paths of anything that might make someone trip, such as tools or rocks.  · Have any leaves, snow, or ice cleared regularly.  · Use sand or salt on walking paths during winter.  · Clean up any spills in your garage right away. This includes grease or oil spills.  What other actions can I take?  · Wear shoes that:  ? Have a low heel. Do not wear high heels.  ? Have rubber  bottoms.  ? Are comfortable and fit you well.  ? Are closed at the toe. Do not wear open-toe sandals.  · Use tools that help you move around (mobility aids) if they are needed. These include:  ? Canes.  ? Walkers.  ? Scooters.  ? Crutches.  · Review your medicines with your doctor. Some medicines can make you feel dizzy. This can increase your chance of falling.  Ask your doctor what other things you can do to help prevent falls.  Where to find more information  · Centers for Disease Control and Prevention, STEAD[place]: https://cdc.gov  · National [place] on A[place]: https://an8wnjb.mahin.nih.gov  Contact a doctor if:  · You are afraid of falling at home.  · You feel weak, drowsy, or dizzy at home.  · You fall at home.  Summary  · There are many simple things that you can do to make your home safe and to help prevent falls.  · Ways to make your home safe include removing tripping hazards and installing grab bars in the bathroom.  · Ask for help when making these changes in your home.  This information is not intended to replace advice given to you by your health care provider. Make sure you discuss any questions you have with your health care provider.  Document Released: 10/14/2010 Document Revised: 04/09/2020 Document Reviewed: 08/02/2018  Elsevier Patient Education © 2020 Elsevier Inc[employer]      Discharge Safety Instructions: Should Have ADULT SUPERVISION     Discharge Safety Concerns: Weakness  The interdisciplinary team has made recommendation that you should have adult supervision in the house due to weakness  Anti-embolic stockings are required during the day and off at night to increase circulation to the lower extremities.    Discharge Diet: Regular     Discharge Liquids: Thin  Discharge Bowel Function: Continent  Please contact your primary care physician for any changes in bowel habits.  Discharge Bowel Program:    Discharge Bladder Function: Continent  Discharge Urinary Devices: None      Nursing Discharge Plan:         Case Management Discharge Instructions:   Discharge Location: Home with Home Health  Agency Name/Address/Phone: [facility]  Home Health: Registered Nurse, Occupational Therapist, Physical Therapist  Outpatient Services:    DME Provider/Phone: no additional equipment needed  Medical Equipment Ordered:    Prescription Faxed to:        Discharge Medication Instructions:  Below are the medications your physician expects you to take upon discharge:    Depression / Suicide Risk    As you are discharged from this [facility] facility, it is important to learn how to keep safe from harming yourself.    Recognize the warning signs:  · Abrupt changes in personality, positive or negative- including increase in energy   · Giving away possessions  · Change in eating patterns- significant weight changes-  positive or negative  · Change in sleeping patterns- unable to sleep or sleeping all the time   · Unwillingness or inability to communicate  · Depression  · Unusual sadness, discouragement and loneliness  · Talk of wanting to die  · Neglect of personal appearance   · Rebelliousness- reckless behavior  · Withdrawal from people/activities they love  · Confusion- inability to concentrate     If you or a loved one observes any of these behaviors or has concerns about self-harm, here's what you can do:  · Talk about it- your feelings and reasons for harming yourself  · Remove any means that you might use to hurt yourself (examples: pills, rope, extension cords, firearm)  · Get professional help from the community (Mental Health, Substance Abuse, psychological counseling)  · Do not be alone:Call your Safe Contact- someone whom you trust who will be there for you.  · Call your local CRISIS HOTLINE 416-9800 or 537-319-2095  · Call your local Children's Mobile Crisis Response Team [place] (254) 493-8092 or www.[place]  · Call the toll free National Suicide Prevention Hotlines   · National Suicide Prevention  "Lifeline 262-714-LZDF (4072)  · [facility] 800-SUICIDE (164-2069)      Prevent Falls in Your Home    \"Falling once doubles your chance of falling again\"        -Center for Disease Control and Prevention    Falls in the home can lead to serious injury (fractures, brain injuries), hospitalizations, increased medical costs, and could even be fatal.  The good news is, there are many precautions you can take to avoid falls in your home and help keep you safe:     · If prescribed an assistive device (walker, crutches), use as instructed by the healthcare provider\"   · Remove any tripping hazards from your home, including loose cords, throw rugs and clutter  · Keep a nightlight on in dark (hallways, bathrooms, etc)   · Get up slowly, to make sure you feel okay before getting up  · Be aware of any side effects of your medications: some medications may make you dizzy  · Place a non-skid rubber mat in your shower or tub-consider a shower bench or chair if unsteady on your feet  · Wear supportive shoes or non-skid socks when moving around  · Start an exercise program once approved by your provider.  If you are feeling weak following a hospital stay, talk to your doctor about home health or outpatient therapy programs designed to help rebuild your strength and endurance    Occupational Therapy Discharge Instructions for Richard Hubbard II    8/7/2020    Level of Assist Required for Eating: Able to Complete Eating without Assist  Level of Assist Required for Grooming: Able to Complete Grooming without Assist  Level of Assist Required for Dressing: Able to Complete Dressing without Assist  Equipment for Dressing: Sock Aid, Reacher, Long Handled Shoe Horn, Dressing Stick  Level of Assist Required for Toileting: Able to Complete Toileting without Assist  Level of Assist Required for Toilet Transfer: Able to Complete Toilet Transfer without Assist  Level of Assist Required for Bathing: Able to Complete Bathing " without Assist  Equipment for Bathing: Shower Chair, Grab Bars in Tub / Shower   For the shower : if you don't install a grab bar  Place the seat in the corner and then put a damp  wash cloth  On the ledge under the class  You can put your hand there for support when standing.    Driving: Please Contact Physician Prior to Driving   Best of michel Up!   I appreciate all your hard work !  We enjoyed working with you!! Ted GIFFORD     Physical Therapy Discharge Instructions for  Brad Hubbard II    8/7/2020    Weight Bearing Status - Patient Should: Bear Weight as Tolerated Right Leg  Level of Assist Required for Ambulation: No Assist on Flat Surfaces, Assist for Balance on Curbs, Assist for Balance on Stairs  Distance Patient May Ambulate: as tolerated > 100 ft  Device Recommended for Ambulation: Front-Wheeled Walker  Level of Assist Required for Transfers: Requires No Assist  Device Recommended for Transfers: Front-Wheeled Walker    Keep working on your range of motion and stretching exercises!! Good luck with your on-going recovery!!  Virgen  / Agata,  your PT's

## 2020-08-08 NOTE — PROGRESS NOTES
Received patient during shift change, report rec'd from day shift RN. Resting in bed, VS stable on room air. Continent of B&B, Mod-I for transfers. A&O x 4, able to make needs known. Bed in low position, call light within reach.

## 2020-08-12 ENCOUNTER — OFFICE VISIT (OUTPATIENT)
Dept: PHYSICAL MEDICINE AND REHAB | Facility: MEDICAL CENTER | Age: 45
End: 2020-08-12
Payer: COMMERCIAL

## 2020-08-12 VITALS
SYSTOLIC BLOOD PRESSURE: 120 MMHG | OXYGEN SATURATION: 97 % | TEMPERATURE: 98 F | DIASTOLIC BLOOD PRESSURE: 70 MMHG | WEIGHT: 222 LBS | HEART RATE: 78 BPM | BODY MASS INDEX: 31.78 KG/M2 | HEIGHT: 70 IN

## 2020-08-12 DIAGNOSIS — M17.10 ARTHRITIS OF KNEE: ICD-10-CM

## 2020-08-12 DIAGNOSIS — M06.9 RHEUMATOID ARTHRITIS INVOLVING MULTIPLE SITES, UNSPECIFIED RHEUMATOID FACTOR PRESENCE: ICD-10-CM

## 2020-08-12 DIAGNOSIS — G89.29 CHRONIC KNEE PAIN, UNSPECIFIED LATERALITY: ICD-10-CM

## 2020-08-12 DIAGNOSIS — F10.10 ALCOHOL ABUSE: ICD-10-CM

## 2020-08-12 DIAGNOSIS — M25.561 CHRONIC PAIN OF RIGHT KNEE: ICD-10-CM

## 2020-08-12 DIAGNOSIS — G89.29 CHRONIC PAIN OF RIGHT KNEE: ICD-10-CM

## 2020-08-12 DIAGNOSIS — Z87.11 HISTORY OF GASTRIC ULCER: ICD-10-CM

## 2020-08-12 DIAGNOSIS — M79.2 NEUROPATHIC PAIN: ICD-10-CM

## 2020-08-12 DIAGNOSIS — M25.569 CHRONIC KNEE PAIN, UNSPECIFIED LATERALITY: ICD-10-CM

## 2020-08-12 DIAGNOSIS — Z98.84 HISTORY OF GASTRIC BYPASS: ICD-10-CM

## 2020-08-12 DIAGNOSIS — Z96.651 S/P TOTAL KNEE ARTHROPLASTY, RIGHT: ICD-10-CM

## 2020-08-12 DIAGNOSIS — S32.592S CLOSED FRACTURE OF RAMUS OF LEFT PUBIS, SEQUELA: ICD-10-CM

## 2020-08-12 PROCEDURE — 99215 OFFICE O/P EST HI 40 MIN: CPT | Performed by: PHYSICAL MEDICINE & REHABILITATION

## 2020-08-12 ASSESSMENT — PATIENT HEALTH QUESTIONNAIRE - PHQ9: CLINICAL INTERPRETATION OF PHQ2 SCORE: 0

## 2020-08-12 ASSESSMENT — PAIN SCALES - GENERAL: PAINLEVEL: 8=MODERATE-SEVERE PAIN

## 2020-08-12 ASSESSMENT — FIBROSIS 4 INDEX: FIB4 SCORE: 0.42

## 2020-08-12 NOTE — PROGRESS NOTES
Follow up patient note  Interventional spine and sports physiatry, Physical medicine rehabilitation      Chief complaint:   Chief Complaint   Patient presents with   • Follow-Up     Knee Pain         HISTORY    Please see new patient note by Dr Gutierrez,  for more details.     HPI  Patient identification: Richard Hubbard II  1975,    With Diagnoses of Closed fracture of ramus of left pubis, sequela, S/P total knee arthroplasty, right, Rheumatoid arthritis involving multiple sites, unspecified rheumatoid factor presence (HCC), Chronic knee pain, unspecified laterality, Arthritis of knee, Chronic pain of right knee, Neuropathic pain, History of gastric ulcer, avoid NSAIDs, History of gastric bypass, avoid NSAIDs, and history of Alcohol abuse were pertinent to this visit.     The patient is status post knee replacement surgery with pain in the right knee which ranges from 6-10/10 in intensity.  He also has chronic pains including pelvic pain.  He has been using Lyrica and morphine IR.  Typically the patient was on morphine IR 15 mg 4 times daily.  During the patient's hospitalization he was tried on several different medications including morphine extended release and additional morphine IR.  I had an extended discussion with the patient and the patient's wife and it was difficult for them to say exactly how much the patient was taking when he was at home.  The patient was hospitalized for a bit over 2 weeks and during that time he was not using his normal prescribed medication from myself as well as the postop medication prescribed by Dr. Pires.    I reviewed the operative report from Dr. Temo Pires MD from 2020 the patient underwent right knee total knee arthroplasty.  I reviewed several notes from the patient's acute rehabilitation stay with Dr. Loi Pisano as well as with Dr. Marcos Henderson with a discharge summary.       ROS Red Flags :   Fever, Chills, Sweats: Denies  Involuntary Weight  Loss: Denies  Bowel/Bladder Incontinence: Denies  Saddle Anesthesia: Denies        PMHx:   Past Medical History:   Diagnosis Date   • ADD (attention deficit disorder)    • Alcohol abuse    • Allergic rhinitis 5/21/2009   • Anemia 09/2019   • Anxiety 5/21/2009   • Arrhythmia     afib when hospitaized for infection   • Back pain 5/21/2009   • Bipolar disorder (HCC)    • Bleeding ulcer 5/21/2009   • Bleeding ulcer 5/21/2009    History of anemia-now on nexium    • Depression 5/21/2009   • Eczema 5/21/2009   • GERD (gastroesophageal reflux disease)    • Heart murmur     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   • Hemorrhagic disorder ([facility])     hx of bleeding ulcers   • History of bleeding ulcers 2/12/2015   • Insomnia 5/21/2009   • Migraine 5/21/2009   • Obesity, morbid ([facility]) 5/21/2009   • Osteomyelitis ([facility]) 10/7/2019   • Pain 09/2019    Chronic knee and back pain   • Pubic ramus fracture ([facility]) 9/28/2019   • Rheumatoid arteritis ([facility]) 09/2019    knee, feet   • Sleep apnea 5/21/2009    Did not tolerate cpap, does not use it   • Snoring        PSHx:   Past Surgical History:   Procedure Laterality Date   • PB TOTAL KNEE ARTHROPLASTY Right 7/22/2020    Procedure: ARTHROPLASTY, KNEE, TOTAL;  Surgeon: Temo Pires M.D.;  Location: SURGERY [facility];  Service: Orthopedics   • TENDON TRANSFER Right 1/22/2020    Procedure: RIGHT DISTAL ULNA RESECTION AND EXTENSOR TENDON TRANSFER;  Surgeon: Marine Rushing M.D.;  Location: [facility];  Service: Orthopedics   • IRRIGATION & DEBRIDEMENT ORTHO Left 10/9/2019    Procedure: IRRIGATION AND DEBRIDEMENT, WOUND - PELVIC OSTEOMYELITIS;  Surgeon: You Olivares M.D.;  Location: [facility];  Service: Orthopedics   • OTHER SURGICAL PROCEDURE  2019    osteomelitis of pelvis cleaned   • GASTRIC BYPASS LAPAROSCOPIC  2000    Lucila en y   • CHOLECYSTECTOMY  2000   • OTHER SURGICAL PROCEDURE      repair of broken penis       Family history   Family History    Problem Relation Age of Onset   • Hypertension Mother    • Arthritis Mother    • Depression Mother    • Cancer Father         bladder   • Schizophrenia Father    • Cancer Maternal Grandmother         breast   • Heart Disease Maternal Grandmother    • Psychiatric Illness Other    • Stroke Maternal Aunt    • Other Neg Hx         no connective tissue disorders or known aortic disease         Medications:   Outpatient Medications Marked as Taking for the 8/12/20 encounter (Office Visit) with Gerardo Gutierrez M.D.   Medication Sig Dispense Refill   • ascorbic acid (VITAMIN C) 500 MG tablet Take 1 Tab by mouth 2 times a day, with meals. 60 Tab 0   • aspirin 81 MG EC tablet Take 1 Tab by mouth every day. 90 Tab 2   • ferrous sulfate 325 (65 Fe) MG tablet Take 1 Tab by mouth 2 times a day, with meals. 60 Tab 0   • gabapentin (NEURONTIN) 300 MG Cap Take 1 Cap by mouth 3 times a day. 90 Cap 1   • morphine (MS IR) 15 MG tablet Take 1 Tab by mouth every 6 hours as needed for up to 7 days. 42 Tab 0   • pregabalin (LYRICA) 150 MG Cap Take 1 Cap by mouth 3 times a day for 30 days. 90 Cap 1   • vitamin D 2000 UNIT Tab Take 1 Tab by mouth every day. 60 Tab    • zolpidem (AMBIEN) 5 MG Tab Take 1 Tab by mouth at bedtime as needed for up to 30 days. 30 Tab 0   • docusate sodium 100 MG Cap Take 100 mg by mouth 2 Times a Day. 60 Cap    • nicotine (NICODERM) 7 MG/24HR PATCH 24 HR Apply 1 Patch to skin as directed every 24 hours. 30 Patch 3   • acetaminophen (TYLENOL) 500 MG Tab Take 500-1,000 mg by mouth every 6 hours as needed.     • losartan (COZAAR) 25 MG Tab Take 1 Tab by mouth every day. 30 Tab 2   • omeprazole (PRILOSEC) 40 MG delayed-release capsule Take 1 Cap by mouth every day. 30 Cap 2        Current Outpatient Medications on File Prior to Visit   Medication Sig Dispense Refill   • ascorbic acid (VITAMIN C) 500 MG tablet Take 1 Tab by mouth 2 times a day, with meals. 60 Tab 0   • aspirin 81 MG EC tablet Take 1 Tab by mouth  "every day. 90 Tab 2   • ferrous sulfate 325 (65 Fe) MG tablet Take 1 Tab by mouth 2 times a day, with meals. 60 Tab 0   • gabapentin (NEURONTIN) 300 MG Cap Take 1 Cap by mouth 3 times a day. 90 Cap 1   • morphine (MS IR) 15 MG tablet Take 1 Tab by mouth every 6 hours as needed for up to 7 days. 42 Tab 0   • pregabalin (LYRICA) 150 MG Cap Take 1 Cap by mouth 3 times a day for 30 days. 90 Cap 1   • vitamin D 2000 UNIT Tab Take 1 Tab by mouth every day. 60 Tab    • zolpidem (AMBIEN) 5 MG Tab Take 1 Tab by mouth at bedtime as needed for up to 30 days. 30 Tab 0   • docusate sodium 100 MG Cap Take 100 mg by mouth 2 Times a Day. 60 Cap    • nicotine (NICODERM) 7 MG/24HR PATCH 24 HR Apply 1 Patch to skin as directed every 24 hours. 30 Patch 3   • acetaminophen (TYLENOL) 500 MG Tab Take 500-1,000 mg by mouth every 6 hours as needed.     • losartan (COZAAR) 25 MG Tab Take 1 Tab by mouth every day. 30 Tab 2   • omeprazole (PRILOSEC) 40 MG delayed-release capsule Take 1 Cap by mouth every day. 30 Cap 2   • Naloxone (NARCAN) 4 MG/0.1ML Liquid One spray in one nostril for overdose and call 911. (Patient taking differently: Spray 1 Spray in nose as needed. One spray in one nostril for overdose and call 911.) 1 Package 0     No current facility-administered medications on file prior to visit.          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   Social Needs   • Financial resource strain: Not on file   • Food insecurity     Worry: " "Not on file     Inability: Not on file   • Transportation needs     Medical: Not on file     Non-medical: Not on file   Tobacco Use   • Smoking status: Current Every Day Smoker     Packs/day: 0.25     Years: 3.00     Pack years: 0.75     Types: Cigarettes, Cigars     Last attempt to quit: 2019     Years since quittin.8   • Smokeless tobacco: Never Used   • Tobacco comment: occasional cigar   Substance and Sexual Activity   • Alcohol use: No     Alcohol/week: 0.0 oz     Comment: quit 2.5 yrs ago- past ETOH abuse   • Drug use: No   • Sexual activity: Yes     Partners: Female     Comment: ;    Lifestyle   • Physical activity     Days per week: Not on file     Minutes per session: Not on file   • Stress: Not on file   Relationships   • Social connections     Talks on phone: Not on file     Gets together: Not on file     Attends Yarsani service: Not on file     Active member of club or organization: Not on file     Attends meetings of clubs or organizations: Not on file     Relationship status: Not on file   • Intimate partner violence     Fear of current or ex partner: Not on file     Emotionally abused: Not on file     Physically abused: Not on file     Forced sexual activity: Not on file   Other Topics Concern   •  Service No   • Blood Transfusions Yes   • Caffeine Concern No   • Occupational Exposure No   • Hobby Hazards No   • Sleep Concern No   • Stress Concern Yes   • Weight Concern Yes   • Special Diet No   • Back Care No   • Exercise No   • Bike Helmet No   • Seat Belt Yes   • Self-Exams Yes   Social History Narrative    , lives in [place]         EXAMINATION     Physical Exam:   Vitals: /70 (BP Location: Right arm, Patient Position: Sitting, BP Cuff Size: Adult)   Pulse 78   Temp 36.7 °C (98 °F) (Temporal)   Ht 1.778 m (5' 10\")   Wt 100.7 kg (222 lb)   SpO2 97%     Constitutional:   Body Habitus: Body mass index is 31.85 kg/m².  Cooperation: Fully cooperates with " exam  Appearance: Well-groomed no disheveled    Respiratory-  breathing comfortable on room air, no audible wheezing  Cardiovascular-  No lower extremity edema is noted.   Psychiatric- alert and oriented ×3. Normal affect.    MSK/NEURO  Swelling of the right knee and surgical scar intact.        MEDICAL DECISION MAKING    DATA    Labs:   Lab Results   Component Value Date/Time    SODIUM 137 07/28/2020 06:18 AM    POTASSIUM 4.1 07/28/2020 06:18 AM    CHLORIDE 104 07/28/2020 06:18 AM    CO2 24 07/28/2020 06:18 AM    GLUCOSE 92 07/28/2020 06:18 AM    BUN 11 07/28/2020 06:18 AM    CREATININE 0.46 (L) 07/28/2020 06:18 AM    CREATININE 0.95 10/19/2010 12:05 PM    BUNCREATRAT 9 10/19/2010 12:05 PM    GLOMRATE >59 10/19/2010 12:05 PM        Lab Results   Component Value Date/Time    PROTHROMBTM 13.6 10/02/2019 08:59 AM    INR 1.03 10/02/2019 08:59 AM        Lab Results   Component Value Date/Time    WBC 6.3 08/04/2020 09:49 AM    WBC 8.4 10/19/2010 12:05 PM    RBC 3.38 (L) 08/04/2020 09:49 AM    RBC 4.96 10/19/2010 12:05 PM    HEMOGLOBIN 9.9 (L) 08/04/2020 09:49 AM    HEMATOCRIT 31.7 (L) 08/04/2020 09:49 AM    MCV 93.8 08/04/2020 09:49 AM    MCV 85 10/19/2010 12:05 PM    MCH 29.3 08/04/2020 09:49 AM    MCH 28.4 10/19/2010 12:05 PM    MCHC 31.2 (L) 08/04/2020 09:49 AM    MPV 8.7 (L) 08/04/2020 09:49 AM    NEUTSPOLYS 69.20 08/04/2020 09:49 AM    LYMPHOCYTES 17.00 (L) 08/04/2020 09:49 AM    MONOCYTES 9.00 08/04/2020 09:49 AM    EOSINOPHILS 2.70 08/04/2020 09:49 AM    BASOPHILS 1.00 08/04/2020 09:49 AM    HYPOCHROMIA 1+ 10/25/2019 04:00 AM    ANISOCYTOSIS 1+ 10/25/2019 04:00 AM        Lab Results   Component Value Date/Time    HBA1C 4.8 10/18/2019 05:18 AM          Imaging:   I personally reviewed following images    MRI pelvis without contrast 10/6/2019 with fracture of the left pubic ramus.    I reviewed the following radiology reports    MRI pelvis with and without contrast 11/14/2019  Interval improvement in the left  parasymphyseal centered edema following subacute fracture with improving muscular and marrow signal     Small fluid collection adjacent to the superior pubic ramus likely is a hematoma. Superinfection is not suspected but cannot be excluded on the basis of imaging alone     Bilateral zone 1 sacral fractures       Results for orders placed during the hospital encounter of 06/07/11   MR-BRAIN-WITH & W/O    Impression 1.  Normal MRI scan of the brain with and without contrast enhancement.                                     Results for orders placed during the hospital encounter of 09/27/19   MR-LUMBAR SPINE-W/O    Impression 1.  Acute bilateral sacral alae fractures.  2.  There is edema in the lower posterior paraspinal soft tissue likely representing soft tissue contusion.  3.  There is no lumbar spine fractures.              Results for orders placed during the hospital encounter of 09/27/19   MR-THORACIC SPINE-W/O    Impression 1.  No acute fracture in the thoracic spine.  2.  Mild chronic compression deformity at T11.        Results for orders placed during the hospital encounter of 09/27/19   MR-LUMBAR SPINE-W/O    Impression 1.  Acute bilateral sacral alae fractures.  2.  There is edema in the lower posterior paraspinal soft tissue likely representing soft tissue contusion.  3.  There is no lumbar spine fractures.                              Results for orders placed during the hospital encounter of 11/14/19   MR-PELVIS-WITH & W/O AND SEQUENCES    Impression Interval improvement in the left parasymphyseal centered edema following subacute fracture with improving muscular and marrow signal    Small fluid collection adjacent to the superior pubic ramus likely is a hematoma. Superinfection is not suspected but cannot be excluded on the basis of imaging alone    Bilateral zone 1 sacral fractures      Results for orders placed during the hospital encounter of 09/27/19   MR-PELVIS W/O    Impression 1.  Limited exam  secondary to significant patient motion.  2.  Subacute fracture involving the left pubic bone with osteomyelitis and probable intraosseous abscess.  3.  Extensive edema and enlargement of the muscles of the medial compartment, pectineus muscle, and pubococcygeus muscle is consistent with known intramuscular abscesses. Fluid collection is most extensive in the adductor longus muscle.  4.  Subacute fractures of the sacrum          Comment: Results given to Dr. Varma through [facility] text at 3:56 PM                                                                                                 Results for orders placed during the hospital encounter of 09/27/19   CT-ABDOMEN-PELVIS WITH    Impression 1.  Interval decrease in the intramuscular abscesses involving the left pelvis and thigh following drain placement. Collection in the pubococcygeus muscle is similar in size to the prior exam.    2.  Ventral hernia containing anterior bowel wall and fat. No secondary obstruction.                 Results for orders placed during the hospital encounter of 09/27/19   CT-PELVIS WITH    Impression 1.  Recent but probably subacute fracture of the left superior pubic ramus near the symphysis. Adjacent and just lateral to this are two complex masslike structures, the largest and most medial of which contains a fluid-filled cavity. These are   consistent with hematomata, possibly intramuscular.                  Results for orders placed during the hospital encounter of 08/07/08   DX-ANKLE 3+ VIEWS    Impression IMPRESSION:    NORMAL LEFT ANKLE SERIES.      TRB/cornelius    Read By ASHLEY HUSAIN MD on Aug  7 2008  6:52PM  : REGGIE Transcription Date: Aug  8 2008 10:10AM  THIS DOCUMENT HAS BEEN ELECTRONICALLY SIGNED BY: ASHLEY HUSAIN MD on   Aug  8 2008  4:42PM               Results for orders placed during the hospital encounter of 05/07/09   DX-CHEST-2 VIEWS    Impression IMPRESSION:    NORMAL CHEST.     JAY JAY/danie           Read By TABITHA SUERO MD on May  7 2009  3:54PM  : [place]ANKLIN Transcription Date: May  7 2009  4:28PM  THIS DOCUMENT HAS BEEN ELECTRONICALLY SIGNED BY: TABITHA SUERO MD on May    7 2009  4:37PM                     Results for orders placed in visit on 16   DX-FOOT-COMPLETE 3+ RIGHT    Impression 1.  Flatfoot deformity.    2.  Mild DJD.        Results for orders placed in visit on 16   DX-HAND 2- RIGHT    Impression Normal exam.                          Results for orders placed during the hospital encounter of 19   DX-LUMBAR SPINE-2 OR 3 VIEWS    Impression Dextroconvex lumbar curvature.  No acute osseous abnormality.  No malalignment.                            Results for orders placed during the hospital encounter of 19   DX-THORACIC SPINE-WITH SWIMMERS VIEW    Impression Mild chronic height loss of the mid thoracic spine from T9 to T11.                   DIAGNOSIS   Visit Diagnoses     ICD-10-CM   1. Closed fracture of ramus of left pubis, sequela  S32.592S   2. S/P total knee arthroplasty, right  Z96.651   3. Rheumatoid arthritis involving multiple sites, unspecified rheumatoid factor presence (HCC)  M06.9   4. Chronic knee pain, unspecified laterality  M25.569    G89.29   5. Arthritis of knee  M17.10   6. Chronic pain of right knee  M25.561    G89.29   7. Neuropathic pain  M79.2   8. History of gastric ulcer, avoid NSAIDs  Z87.19   9. History of gastric bypass, avoid NSAIDs  Z98.84   10. history of Alcohol abuse  F10.10         ASSESSMENT and PLAN:     Richard Salinas Stevie II  1975,         was seen today for follow-up.    Diagnoses and all orders for this visit:    Closed fracture of ramus of left pubis, sequela    S/P total knee arthroplasty, right    Rheumatoid arthritis involving multiple sites, unspecified rheumatoid factor presence (HCC)    Chronic knee pain, unspecified laterality    Arthritis of knee    Chronic pain of right knee    Neuropathic  pain    History of gastric ulcer, avoid NSAIDs    History of gastric bypass, avoid NSAIDs    history of Alcohol abuse        I also discussed the case with Dr. Marcos Henderson who took care of the patient while he was on the inpatient rehab.  I discussed the case with the patient's wife Katiana who is also a nurse practitioner.    We discussed that the patient should not exceed 90 morphine equivalents a day.  It is reasonable for him to be on morphine IR, 6 times daily in this postop.  The patient was also written several medications including morphine IR and morphine ER from his surgeon.  During the postop period the patient should have his pain managed by the surgery team only.  Given that the patient has been inpatient during my prescription as well as the patient's surgery team's prescription there should be approximately an extra 80 tabs of oxycodone IR 15 mg at home.  I advised the patient to follow-up with the surgery team within 1 week and during this timeframe it is reasonable for him to use morphine IR 15 mg 6 times daily until his surgery team can adjust his pain meds for his postsurgical pain.    Follow-up with me in approximately 6-8 weeks after his knee replacement as long as things are going well and his pain is back towards its baseline.         Total time: 60 minutes face-to-face time. I spent greater than 50% of the time for patient care and coordination on this date, including with the patient as per assessment and plan above.           Last opioid risk scale: 9/19/2019   Last urine drug screen: 9/19/2019, consistent  Last controlled substance agreement: 9/19/2019    reviewed: 8/12/2020   Naloxone prescribed: yes    Opioid Risk Score: 16    Interpretation of Opioid Risk Score   Score 0-3 = Low risk of abuse. Do UDS at least once per year.  Score 4-7 = Moderate risk of abuse. Do UDS 1-4 times per year.  Score 8+ = High risk of abuse. Refer to specialist.     I reviewed the     In prescribing  controlled substances to this patient, I certify that I have obtained and reviewed the medical history of Richard Hubbard II. I have also made a good gabe effort to obtain applicable records from other providers who have treated the patient and records did not demonstrate any increased risk of substance abuse that would prevent me from prescribing controlled substances.     I have conducted a physical exam and documented it. I have reviewed Mr. Hubbard’s prescription history as maintained by the [place] Prescription Monitoring Program.     I have assessed the patient’s risk for abuse, dependency, and addiction using the validated Opioid Risk Tool available at https://www.mdcalc.com/kjgawl-bptv-muht-ort-narcotic-abuse.     Given the above, I believe the benefits of controlled substance therapy outweigh the risks. The reasons for prescribing controlled substances include non-narcotic, oral analgesic alternatives have been inadequate for pain control. Accordingly, I have discussed the risk and benefits, treatment plan, and alternative therapies with the patient.               Follow up: 3 months    Thank you for allowing me to participate in the care of this patient. If you have any questions please not hesitate to contact me.        Please note that this dictation was created using voice recognition software. I have made every reasonable attempt to correct obvious errors but there may be errors of grammar and content that I may have overlooked prior to finalization of this note.      Gerardo Gutierrez MD  Interventional Spine and Sports Physiatry  Physical Medicine and Rehabilitation  [facility]

## 2020-08-13 ENCOUNTER — HOSPITAL ENCOUNTER (OUTPATIENT)
Dept: RADIOLOGY | Facility: MEDICAL CENTER | Age: 45
End: 2020-08-13
Attending: NURSE PRACTITIONER
Payer: COMMERCIAL

## 2020-08-13 ENCOUNTER — OFFICE VISIT (OUTPATIENT)
Dept: MEDICAL GROUP | Facility: MEDICAL CENTER | Age: 45
End: 2020-08-13
Payer: COMMERCIAL

## 2020-08-13 VITALS
TEMPERATURE: 98 F | OXYGEN SATURATION: 98 % | BODY MASS INDEX: 33.32 KG/M2 | HEIGHT: 71 IN | WEIGHT: 238 LBS | HEART RATE: 78 BPM | DIASTOLIC BLOOD PRESSURE: 68 MMHG | SYSTOLIC BLOOD PRESSURE: 104 MMHG

## 2020-08-13 DIAGNOSIS — G47.33 OSA (OBSTRUCTIVE SLEEP APNEA): Chronic | ICD-10-CM

## 2020-08-13 DIAGNOSIS — Z87.39 HISTORY OF OSTEOMYELITIS: ICD-10-CM

## 2020-08-13 DIAGNOSIS — M79.89 RIGHT LEG SWELLING: ICD-10-CM

## 2020-08-13 DIAGNOSIS — F39 MOOD DISORDER (HCC): Chronic | ICD-10-CM

## 2020-08-13 DIAGNOSIS — A46 ERYSIPELAS OF RIGHT LOWER EXTREMITY: ICD-10-CM

## 2020-08-13 DIAGNOSIS — Z87.81 HISTORY OF FRACTURE: ICD-10-CM

## 2020-08-13 DIAGNOSIS — Z96.651 S/P TOTAL KNEE REPLACEMENT, RIGHT: ICD-10-CM

## 2020-08-13 DIAGNOSIS — Z92.25 HISTORY OF IMMUNOSUPPRESSIVE THERAPY: ICD-10-CM

## 2020-08-13 PROCEDURE — 93971 EXTREMITY STUDY: CPT | Mod: RT

## 2020-08-13 PROCEDURE — 99214 OFFICE O/P EST MOD 30 MIN: CPT | Performed by: NURSE PRACTITIONER

## 2020-08-13 RX ORDER — CEPHALEXIN 500 MG/1
500 CAPSULE ORAL 4 TIMES DAILY
Qty: 28 CAP | Refills: 0 | Status: SHIPPED | OUTPATIENT
Start: 2020-08-13 | End: 2020-08-20

## 2020-08-13 ASSESSMENT — FIBROSIS 4 INDEX: FIB4 SCORE: 0.42

## 2020-08-13 NOTE — PROGRESS NOTES
cc:  Follow up Right total knee replacement.       Subjective:     HPI:     Richard Hubbard II is a 45 y.o. male here to discuss the evaluation and management of:     He is here with his wife today.    Patient presents today to have a follow-up after his Right Total Knee Replacement on July 22nd with Dr. Benny Pires.  Postoperatively patient has been doing well.  Has been using a front wheeled walker.  He mentions that prior to his surgery he had been complaining of right posterior hip pain/buttock pain.  States this started just about 3 weeks prior to his surgery.  He had not made mention to this at his preop evaluation.  He reports having imaging completed by his orthopedist just prior to his knee replacement.  He was informed that he does have a pelvic fracture.  Patient complains of having continued pain in this same area.  No imaging available.       At this time upon inspection patient does have what appears to be erysipelas on his right lower leg knee down to his ankle, approximately 1-2+ pitting.  Incision is well approximated, no drainage or erythema noted.  There is no fluctuant mass palpated.  Patient admits to slipping off the couch yesterday and subsequently rolling onto her right knee and trying to get back onto the couch.  He reports that swelling did increase compared to the day before.  No fevers.    History of osteomyelitis  Patient does have a history of having osteomyelitis back in September 2019 in his left groin and pubic area status post fall.  He did have a CT of the pelvis time and it revealed a subacute fracture of the left superior pubic ramus near the symphysis and a complex masslike structure concerning for fluid-filled cavity consistent with a hematoma.  Culture positive for MSSA      Obstructive sleep apnea  Needs an updated referral for sleep studies.    Mood disorder/bipolar  Needs updated referral to behavioral health.  Last visit with behavioral health was in December  2019.    History of immunosuppressive therapy, history of fractures  Needs Dexa scan.  Has had multiple fractures without concurrent fall.    ROS:  Denies any Headache, Blurred Vision, Confusion, Chest pain,  Shortness of breath,  Abdominal pain, Changes of bowel or bladder, Lower ext edema, Fevers, Nights sweats, Weight Changes, Focal weakness or numbness.  And all other systems reviewed and are all negative.right leg pain/swelling, redness.         Current Outpatient Medications:   •  cephALEXin (KEFLEX) 500 MG Cap, Take 1 Cap by mouth 4 times a day for 7 days., Disp: 28 Cap, Rfl: 0  •  ascorbic acid (VITAMIN C) 500 MG tablet, Take 1 Tab by mouth 2 times a day, with meals., Disp: 60 Tab, Rfl: 0  •  aspirin 81 MG EC tablet, Take 1 Tab by mouth every day., Disp: 90 Tab, Rfl: 2  •  ferrous sulfate 325 (65 Fe) MG tablet, Take 1 Tab by mouth 2 times a day, with meals., Disp: 60 Tab, Rfl: 0  •  gabapentin (NEURONTIN) 300 MG Cap, Take 1 Cap by mouth 3 times a day., Disp: 90 Cap, Rfl: 1  •  morphine (MS IR) 15 MG tablet, Take 1 Tab by mouth every 6 hours as needed for up to 7 days., Disp: 42 Tab, Rfl: 0  •  pregabalin (LYRICA) 150 MG Cap, Take 1 Cap by mouth 3 times a day for 30 days., Disp: 90 Cap, Rfl: 1  •  vitamin D 2000 UNIT Tab, Take 1 Tab by mouth every day., Disp: 60 Tab, Rfl:   •  zolpidem (AMBIEN) 5 MG Tab, Take 1 Tab by mouth at bedtime as needed for up to 30 days., Disp: 30 Tab, Rfl: 0  •  docusate sodium 100 MG Cap, Take 100 mg by mouth 2 Times a Day., Disp: 60 Cap, Rfl:   •  nicotine (NICODERM) 7 MG/24HR PATCH 24 HR, Apply 1 Patch to skin as directed every 24 hours., Disp: 30 Patch, Rfl: 3  •  acetaminophen (TYLENOL) 500 MG Tab, Take 500-1,000 mg by mouth every 6 hours as needed., Disp: , Rfl:   •  Naloxone (NARCAN) 4 MG/0.1ML Liquid, One spray in one nostril for overdose and call 911. (Patient taking differently: Spray 1 Spray in nose as needed. One spray in one nostril for overdose and call 911.), Disp: 1  "Package, Rfl: 0  •  losartan (COZAAR) 25 MG Tab, Take 1 Tab by mouth every day., Disp: 30 Tab, Rfl: 2  •  omeprazole (PRILOSEC) 40 MG delayed-release capsule, Take 1 Cap by mouth every day., Disp: 30 Cap, Rfl: 2    Allergies   Allergen Reactions   • Benadryl [Altaryl]      \"I get agitated\"   • Nsaids      Bleeding, \"I had a bleeding ulcer\"   • Phenergan [Promethazine Hcl]      \"I get very agitated\"   • Penicillins      \"Had some dizziness\"  Tolerated Unasyn 10/2019       Past Medical History:   Diagnosis Date   • ADD (attention deficit disorder)    • Alcohol abuse    • Allergic rhinitis 5/21/2009   • Anemia 09/2019   • Anxiety 5/21/2009   • Arrhythmia     afib when hospitaized for infection   • Back pain 5/21/2009   • Bipolar disorder (HCC)    • Bleeding ulcer 5/21/2009   • Bleeding ulcer 5/21/2009    History of anemia-now on nexium    • Depression 5/21/2009   • Eczema 5/21/2009   • GERD (gastroesophageal reflux disease)    • Heart murmur     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   • Hemorrhagic disorder (HCC)     hx of bleeding ulcers   • History of bleeding ulcers 2/12/2015   • Insomnia 5/21/2009   • Migraine 5/21/2009   • Obesity, morbid ([facility]) 5/21/2009   • Osteomyelitis ([facility]) 10/7/2019   • Pain 09/2019    Chronic knee and back pain   • Pubic ramus fracture ([facility]) 9/28/2019   • Rheumatoid arteritis ([facility]) 09/2019    knee, feet   • Sleep apnea 5/21/2009    Did not tolerate cpap, does not use it   • Snoring      Past Surgical History:   Procedure Laterality Date   • PB TOTAL KNEE ARTHROPLASTY Right 7/22/2020    Procedure: ARTHROPLASTY, KNEE, TOTAL;  Surgeon: Temo Pires M.D.;  Location: SURGERY [facility]S;  Service: Orthopedics   • TENDON TRANSFER Right 1/22/2020    Procedure: RIGHT DISTAL ULNA RESECTION AND EXTENSOR TENDON TRANSFER;  Surgeon: Marine Rushing M.D.;  Location: SURGERY [facility];  Service: Orthopedics   • IRRIGATION & DEBRIDEMENT ORTHO Left 10/9/2019    Procedure: IRRIGATION AND " DEBRIDEMENT, WOUND - PELVIC OSTEOMYELITIS;  Surgeon: You Olivares M.D.;  Location: SURGERY [facility];  Service: Orthopedics   • OTHER SURGICAL PROCEDURE      osteomelitis of pelvis cleaned   • GASTRIC BYPASS LAPAROSCOPIC      Lucila en y   • CHOLECYSTECTOMY     • OTHER SURGICAL PROCEDURE      repair of broken penis     Family History   Problem Relation Age of Onset   • Hypertension Mother    • Arthritis Mother    • Depression Mother    • Cancer Father         bladder   • Schizophrenia Father    • Cancer Maternal Grandmother         breast   • Heart Disease Maternal Grandmother    • Psychiatric Illness Other    • Stroke Maternal Aunt    • Other Neg Hx         no connective tissue disorders or known aortic disease     Social History     Socioeconomic History   • Marital status:      Spouse name: Not on file   • Number of children: Not on file   • Years of education: Not on file   • Highest education level: Not on file   Occupational History   • 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   Social Needs   • Financial resource strain: Not on file   • Food insecurity     Worry: Not on file     Inability: Not on file   • Transportation needs     Medical: Not on file     Non-medical: Not on file   Tobacco Use   • Smoking status: Current Every Day Smoker     Packs/day: 0.25     Years: 3.00     Pack years: 0.75     Types: Cigarettes, Cigars     Last attempt to quit: 2019     Years since quittin.8   • Smokeless tobacco: Never Used   • Tobacco comment: occasional cigar   Substance and Sexual Activity   • Alcohol use: No     Alcohol/week: 0.0 oz     Comment: quit 2.5 yrs ago- past ETOH abuse   • Drug use: No   • Sexual activity: Yes     Partners: Female     Comment: ;    Lifestyle   • Physical activity     Days per week: Not on file     Minutes per session: Not on file   • Stress: Not on file   Relationships   • Social connections     Talks on phone: Not on file     Gets together: Not on file      "Attends [employer] service: Not on file     Active member of club or organization: Not on file     Attends meetings of clubs or organizations: Not on file     Relationship status: Not on file   • Intimate partner violence     Fear of current or ex partner: Not on file     Emotionally abused: Not on file     Physically abused: Not on file     Forced sexual activity: Not on file   Other Topics Concern   •  Service No   • Blood Transfusions Yes   • Caffeine Concern No   • Occupational Exposure No   • Hobby Hazards No   • Sleep Concern No   • Stress Concern Yes   • Weight Concern Yes   • Special Diet No   • Back Care No   • Exercise No   • Bike Helmet No   • Seat Belt Yes   • Self-Exams Yes   Social History Narrative    , lives in [place]       Objective:     Vitals: /68 (BP Location: Right arm, Patient Position: Sitting, BP Cuff Size: Large adult)   Pulse 78   Temp 36.7 °C (98 °F)   Ht 1.803 m (5' 11\")   Wt 108 kg (238 lb)   SpO2 98%   BMI 33.19 kg/m²    General: Alert, pleasant, NAD  HEENT: Normocephalic.    Skin: Warm, dry, right lower leg with 1-2 pitting edema, erysipelas from knee down generalized, anterior portion of his right lower leg, large anterior midline incision well approximated.  Extremities: No leg edema. No discoloration  Neurological: No tremors  Psych:  Affect/mood is normal, judgement is good, memory is intact, grooming is appropriate.    Assessment/Plan:      was seen today for knee pain.    Diagnoses and all orders for this visit:    S/P total knee replacement, right  July 22, 2020 with total right knee.  Continue follow-up with orthopedics for this.  Incision is well approximated-however erysipelas and edema present.  Concern this may further progress into cellulitis.    Erysipelas of right lower extremity  New problem.  Concern for progression of cellulitis.  Patient does have a high risk for infection.  Start on Keflex.  Follow-up with [facility] soon as he can for " evaluation.  Patient and wife state they will head over there today.  -     cephALEXin (KEFLEX) 500 MG Cap; Take 1 Cap by mouth 4 times a day for 7 days.    Right leg swelling  Rule out possible DVT.  -     US-EXTREMITY VENOUS LOWER UNILAT RIGHT; Future    History of osteomyelitis  September 2019.  Records are in the chart.    History of fracture  History of pubic fracture in 2019.  patient reports having another pelvic fracture-imaging completed at [facility] from his orthopedist.  Cannot confirm at this time.  Requesting records.    -     DS-BONE DENSITY STUDY (DEXA); Future    History of immunosuppressive therapy  Concern for osteoporosis.  Has had chronic long-term steroid use and immunosuppressants.  Bone density ordered.  -     DS-BONE DENSITY STUDY (DEXA); Future    Mood disorder (HCC)  Not well controlled.  Needs consistent follow-up.  Referral replaced back to behavioral health.  -     REFERRAL TO BEHAVIORAL HEALTH    [facility] (obstructive sleep apnea)  -     REFERRAL TO PULMONARY AND SLEEP MEDICINE Sleep Medicine      Return if symptoms worsen or fail to improve.          Brit RESENDIZ

## 2020-08-13 NOTE — PATIENT INSTRUCTIONS
All post op pain meds to be written by the patient's surgeon during the post op period until the patient is stable, for the first 6-8 weeks post op.       For now I recommend not to exceed morphine IR 15mg,  six times daily for now unless the patient's surgeon overrides this for the post op period.

## 2020-08-14 ENCOUNTER — DOCUMENTATION (OUTPATIENT)
Dept: MEDICAL GROUP | Facility: MEDICAL CENTER | Age: 45
End: 2020-08-14

## 2020-08-14 ENCOUNTER — HOSPITAL ENCOUNTER (EMERGENCY)
Facility: MEDICAL CENTER | Age: 45
End: 2020-08-15
Attending: EMERGENCY MEDICINE | Admitting: EMERGENCY MEDICINE
Payer: COMMERCIAL

## 2020-08-14 DIAGNOSIS — L55.9 BURN FROM THE SUN: ICD-10-CM

## 2020-08-14 DIAGNOSIS — T88.8XXA FLUID COLLECTION AT SURGICAL SITE, INITIAL ENCOUNTER: ICD-10-CM

## 2020-08-14 PROBLEM — Z92.25 HISTORY OF IMMUNOSUPPRESSIVE THERAPY: Status: ACTIVE | Noted: 2020-08-14

## 2020-08-14 PROBLEM — Z87.39 HISTORY OF OSTEOMYELITIS: Status: ACTIVE | Noted: 2020-08-14

## 2020-08-14 LAB
ANION GAP SERPL CALC-SCNC: 6 MMOL/L (ref 7–16)
BASOPHILS # BLD AUTO: 0.8 % (ref 0–1.8)
BASOPHILS # BLD: 0.04 K/UL (ref 0–0.12)
BUN SERPL-MCNC: 9 MG/DL (ref 8–22)
CALCIUM SERPL-MCNC: 8.5 MG/DL (ref 8.4–10.2)
CHLORIDE SERPL-SCNC: 106 MMOL/L (ref 96–112)
CO2 SERPL-SCNC: 29 MMOL/L (ref 20–33)
CREAT SERPL-MCNC: 0.66 MG/DL (ref 0.5–1.4)
EOSINOPHIL # BLD AUTO: 0.14 K/UL (ref 0–0.51)
EOSINOPHIL NFR BLD: 3 % (ref 0–6.9)
ERYTHROCYTE [DISTWIDTH] IN BLOOD BY AUTOMATED COUNT: 49.8 FL (ref 35.9–50)
GLUCOSE SERPL-MCNC: 74 MG/DL (ref 65–99)
HCT VFR BLD AUTO: 31.6 % (ref 42–52)
HGB BLD-MCNC: 9.6 G/DL (ref 14–18)
IMM GRANULOCYTES # BLD AUTO: 0.02 K/UL (ref 0–0.11)
IMM GRANULOCYTES NFR BLD AUTO: 0.4 % (ref 0–0.9)
INR PPP: 1.04 (ref 0.87–1.13)
LACTATE BLD-SCNC: 1.4 MMOL/L (ref 0.5–2)
LYMPHOCYTES # BLD AUTO: 1.42 K/UL (ref 1–4.8)
LYMPHOCYTES NFR BLD: 30.1 % (ref 22–41)
MCH RBC QN AUTO: 28.4 PG (ref 27–33)
MCHC RBC AUTO-ENTMCNC: 30.4 G/DL (ref 33.7–35.3)
MCV RBC AUTO: 93.5 FL (ref 81.4–97.8)
MONOCYTES # BLD AUTO: 0.48 K/UL (ref 0–0.85)
MONOCYTES NFR BLD AUTO: 10.2 % (ref 0–13.4)
NEUTROPHILS # BLD AUTO: 2.62 K/UL (ref 1.82–7.42)
NEUTROPHILS NFR BLD: 55.5 % (ref 44–72)
NRBC # BLD AUTO: 0 K/UL
NRBC BLD-RTO: 0 /100 WBC
PLATELET # BLD AUTO: 282 K/UL (ref 164–446)
PMV BLD AUTO: 8.6 FL (ref 9–12.9)
POTASSIUM SERPL-SCNC: 3.9 MMOL/L (ref 3.6–5.5)
PROCALCITONIN SERPL-MCNC: 0.06 NG/ML
PROTHROMBIN TIME: 13.7 SEC (ref 12–14.6)
RBC # BLD AUTO: 3.38 M/UL (ref 4.7–6.1)
SODIUM SERPL-SCNC: 141 MMOL/L (ref 135–145)
WBC # BLD AUTO: 4.7 K/UL (ref 4.8–10.8)

## 2020-08-14 PROCEDURE — 83605 ASSAY OF LACTIC ACID: CPT

## 2020-08-14 PROCEDURE — 87040 BLOOD CULTURE FOR BACTERIA: CPT

## 2020-08-14 PROCEDURE — 80048 BASIC METABOLIC PNL TOTAL CA: CPT

## 2020-08-14 PROCEDURE — 85025 COMPLETE CBC W/AUTO DIFF WBC: CPT

## 2020-08-14 PROCEDURE — 96374 THER/PROPH/DIAG INJ IV PUSH: CPT

## 2020-08-14 PROCEDURE — 85610 PROTHROMBIN TIME: CPT

## 2020-08-14 PROCEDURE — 36415 COLL VENOUS BLD VENIPUNCTURE: CPT

## 2020-08-14 PROCEDURE — 700111 HCHG RX REV CODE 636 W/ 250 OVERRIDE (IP): Performed by: EMERGENCY MEDICINE

## 2020-08-14 PROCEDURE — 84145 PROCALCITONIN (PCT): CPT

## 2020-08-14 PROCEDURE — 99284 EMERGENCY DEPT VISIT MOD MDM: CPT

## 2020-08-14 RX ORDER — MORPHINE SULFATE 10 MG/ML
6 INJECTION, SOLUTION INTRAMUSCULAR; INTRAVENOUS ONCE
Status: COMPLETED | OUTPATIENT
Start: 2020-08-15 | End: 2020-08-14

## 2020-08-14 RX ADMIN — MORPHINE SULFATE 6 MG: 10 INJECTION INTRAVENOUS at 23:50

## 2020-08-14 ASSESSMENT — FIBROSIS 4 INDEX: FIB4 SCORE: 0.42

## 2020-08-14 NOTE — PROGRESS NOTES
Have called the patient to inform him of his right lower extremity ultrasound results.  Have notified the on-call orthopedist of the results and patient is advised to present to the emergency room for further work-up.  Have called and notified patient of this and he reports that he will present to the ER for further work-up.  At this time he does report having slightly increased swelling.  No fevers.  Positive for pain.

## 2020-08-15 VITALS
RESPIRATION RATE: 20 BRPM | SYSTOLIC BLOOD PRESSURE: 101 MMHG | HEIGHT: 71 IN | BODY MASS INDEX: 33.02 KG/M2 | TEMPERATURE: 97.1 F | HEART RATE: 84 BPM | OXYGEN SATURATION: 98 % | DIASTOLIC BLOOD PRESSURE: 72 MMHG | WEIGHT: 235.89 LBS

## 2020-08-15 NOTE — ED PROVIDER NOTES
ED Provider Note    CHIEF COMPLAINT  Chief Complaint   Patient presents with   • Knee Swelling       HPI  Richard Hubbard II is a 45 y.o. male who presents to the emergency department chief complaint of swelling and pain to the right leg.  The patient underwent a right total knee on July 22 with Dr. Stock and was discharged from inpatient rehab on the eighth of this month with good improvement in mobility.  He states a few days ago he fell off the couch and had a landed funny and then had worsening swelling to the leg was followed up by his primary care provider yesterday they were concerned for early infection although he denies any fevers or chills.  Outpatient ultrasound revealed a subcu fluid collection of the right distal thigh possible seroma hematoma or infected fluid.  He had worsening redness on his leg today which is why present in the emergency department after being told to come in.  He was supposed to be started on Keflex by his PCP but he did follow-up at the [facility] and they told him not to be started on antibiotics because at that time did not appear to have any signs of infection.  He denies any chest pain or shortness of breath fevers chills nausea vomiting    Underwent R total knee on 7/22 w Dr Pires, discharged from inpatient rehab on 8/8 with good improvement in mobility     US RLE for DVT  IMPRESSION:     1.  Negative for right lower extremity deep venous thrombus     2.  Right medial distal thigh/proximal leg subcutaneous fluid collection measuring 14.1 x 5.8 x 2.2 cm and consistent with postoperative seroma, hematoma, or infected fluid      REVIEW OF SYSTEMS  Positives as above. Pertinent negatives include chest pain shortness of breath weakness numbness tingling easy bleeding or bruising recent infection worsening fatigue fevers chills night sweats weight loss  All other review of systems are negative    PAST MEDICAL HISTORY   has a past medical history of ADD (attention deficit  disorder), Alcohol abuse, Allergic rhinitis (2009), Anemia (2019), Anxiety (2009), Arrhythmia, Back pain (2009), Bipolar disorder ([facility]), Bleeding ulcer (2009), Bleeding ulcer (2009), Depression (2009), Eczema (2009), GERD (gastroesophageal reflux disease), Heart murmur, Hemorrhagic disorder ([facility]), History of bleeding ulcers (2015), Insomnia (2009), Migraine (2009), Obesity, morbid ([facility]) (2009), Osteomyelitis ([facility]) (10/7/2019), Pain (2019), Pubic ramus fracture ([facility]) (2019), Rheumatoid arteritis ([facility]) (2019), Sleep apnea (2009), and Snoring.    SOCIAL HISTORY  Social History     Tobacco Use   • Smoking status: Current Every Day Smoker     Packs/day: 0.25     Years: 3.00     Pack years: 0.75     Types: Cigarettes, Cigars     Last attempt to quit: 2019     Years since quittin.8   • Smokeless tobacco: Never Used   • Tobacco comment: occasional cigar   Substance and Sexual Activity   • Alcohol use: No     Alcohol/week: 0.0 oz     Comment: quit 2.5 yrs ago- past ETOH abuse   • Drug use: No   • Sexual activity: Yes     Partners: Female     Comment: ;        SURGICAL HISTORY   has a past surgical history that includes gastric bypass laparoscopic (); cholecystectomy (); irrigation & debridement ortho (Left, 10/9/2019); tendon transfer (Right, 2020); other surgical procedure; other surgical procedure (); and total knee arthroplasty (Right, 2020).    CURRENT MEDICATIONS  Home Medications     Reviewed by Ghazal Rivera R.N. (Registered Nurse) on 20 at 2215  Med List Status: Partial   Medication Last Dose Status   acetaminophen (TYLENOL) 500 MG Tab  Active   ascorbic acid (VITAMIN C) 500 MG tablet  Active   aspirin 81 MG EC tablet  Active   cephALEXin (KEFLEX) 500 MG Cap  Active   docusate sodium 100 MG Cap  Active   ferrous sulfate 325 (65 Fe) MG tablet  Active   gabapentin (NEURONTIN) 300 MG Cap  Active  "  losartan (COZAAR) 25 MG Tab  Active   morphine (MS IR) 15 MG tablet  Active   Naloxone (NARCAN) 4 MG/0.1ML Liquid  Active   nicotine (NICODERM) 7 MG/24HR PATCH 24 HR  Active   omeprazole (PRILOSEC) 40 MG delayed-release capsule  Active   pregabalin (LYRICA) 150 MG Cap  Active   vitamin D 2000 UNIT Tab  Active   zolpidem (AMBIEN) 5 MG Tab  Active                ALLERGIES  Allergies   Allergen Reactions   • Benadryl [Altaryl]      \"I get agitated\"   • Nsaids      Bleeding, \"I had a bleeding ulcer\"   • Phenergan [Promethazine Hcl]      \"I get very agitated\"   • Penicillins      \"Had some dizziness\"  Tolerated Unasyn 10/2019       PHYSICAL EXAM  VITAL SIGNS: /77   Pulse 93   Temp 36.2 °C (97.1 °F) (Temporal)   Resp 20   Ht 1.803 m (5' 11\")   Wt 107 kg (235 lb 14.3 oz)   SpO2 98%   BMI 32.90 kg/m²    Pulse ox interpretation: I interpret this pulse ox as normal.  Constitutional: Alert in no apparent distress.  HENT: Normocephalic atraumatic, MMM  Eyes: PER, Conjunctiva normal, Non-icteric.   Neck: Normal range of motion, No tenderness, Supple, No stridor.   Cardiovascular: Regular rate and rhythm, no murmurs.   Thorax & Lungs: Normal breath sounds, No respiratory distress, No wheezing, No chest tenderness.   Abdomen: Bowel sounds normal, Soft, No tenderness, No pulsatile masses. No peritoneal signs.  Skin: Warm, Dry, No erythema, No rash.  Right bicep also has erythema without induration evidence of a sunburn with the fine demarcation  Back: No bony tenderness, No CVA tenderness.   Extremities/MSK: Intact equal distal pulses  Right lower extremity is swollen compared to the left lower extremity without pitting edema, there is a midline incision over the knee which is clean dry and intact without any leakage, he does have some swelling and fluctuance over the right medial distal thigh there is some erythema however there is no induration that is associated with it but the erythema kind of ends in a line at " the mid thigh and a line at the sock area is not circumferential normal DP pulses 2+ bilaterally sensation intact light touch distally, no evidence of large joint effusion  Neurologic: Alert and oriented x3, No focal deficits noted.       DIFFERENTIAL DIAGNOSIS AND WORK UP PLAN    This is a 45 y.o. male who presents with recent diagnosis of hematoma seroma to the right medial thigh, he does not have any systemic symptoms but he does have a history of RA and the last time he had an abscess and osteomyelitis of his pelvis he was on Humira at the time, he is no longer on that and he and his wife are just very concerned that we may be listing a deep sided infection despite his totally feeling well.  Unfortunately I do not see any evidence of cellulitis but it appears more of a sunburn on the right side and could be especially in light of the one on his bicep.  Will perform laboratory analysis including cultures and then discuss case with orthopedics on-call.    DIAGNOSTIC STUDIES / PROCEDURES    EKG      LABS  Pertinent Lab Findings  CBC white count is 4.7 which is down from 6 but there is no associated left shift and his hemoglobin is 9.6 which is stable with a normal BMP normal coags lactate and pro calcitonin was normal and cultures were sent      RADIOLOGY  No orders to display     The radiologist's interpretation of all radiological studies have been reviewed by me.      COURSE & MEDICAL DECISION MAKING  Pertinent Labs & Imaging studies reviewed. (See chart for details)    12:01 AM   Spoke with Dr. Olivares on-call for Dr. Pires we discussed his laboratory findings his vital signs imaging as well as my evaluation of the patient.  He at this point believes most likely does have a sunburn but as the incision is clean well-appearing he just wishes for him to follow-up in a week for reevaluation of the possible hematoma or seroma, cultures were sent and he appreciates that but otherwise does not wish for the patient be  "on antibiotics does not need emergent drainage nor does he require hospitalization at this time.    I discussed this with the patient without evidence of inflammatory markers or left shift or other signs of infection just a sunburn and a hematoma patient can be discharged home and will follow up with orthopedics within the next week and return to emergency department for any new or worsening issues.    /72   Pulse 84   Temp 36.2 °C (97.1 °F) (Temporal)   Resp 20   Ht 1.803 m (5' 11\")   Wt 107 kg (235 lb 14.3 oz)   SpO2 98%   BMI 32.90 kg/m²       I verified that the patient was wearing a mask and I was wearing appropriate PPE every time I entered the room. The patient's mask was on the patient at all times during my encounter except for a brief view of the oropharynx    The patient will return for new or worsening symptoms and is stable at the time of discharge.    The patient is referred to a primary physician for blood pressure management, diabetic screening, and for all other preventative health concerns.    DISPOSITION:  Patient will be discharged home in stable condition.    FOLLOW UP:  WOODY TurnerRMaryN.  [address] [facility] [address]  [place] [id number] [phone]    Schedule an appointment as soon as possible for a visit       Temo Pires M.D.  [address] [facility] [address]  [phone]    Call on 8/17/2020  to make an appt for next week for [facility], Emergency Dept  [address]  [place] [id number] [phone]    If symptoms worsen      OUTPATIENT MEDICATIONS:  New Prescriptions    No medications on file         FINAL IMPRESSION  1. Fluid collection at surgical site, initial encounter     2. Burn from the sun             Electronically signed by: Majo De Jesus M.D., 8/14/2020 10:22 PM    This dictation has been created using voice recognition software and/or scribes. The accuracy of the dictation is limited by the " abilities of the software and the expertise of the scribes. I expect there may be some errors of grammar and possibly content. I made every attempt to manually correct the errors within my dictation. However, errors related to voice recognition software and/or scribes may still exist and should be interpreted within the appropriate context.

## 2020-08-15 NOTE — ED NOTES
Pt discharged in good condition with instructions to follow up with ortho, verbalizes understanding of all ambulates out with steady gait on walker accompanied by by wife.

## 2020-08-15 NOTE — ED TRIAGE NOTES
3 weeks post-op knee replacement. Was seen yesterday and had an ultrasound done for redness/swelling since Wed. Received results today of fluid collection and told to come to ED.    Pt and visitor deny respiratory symptoms, fever, or known covid exposure.

## 2020-08-20 ENCOUNTER — TELEPHONE (OUTPATIENT)
Dept: PHYSICAL MEDICINE AND REHAB | Facility: MEDICAL CENTER | Age: 45
End: 2020-08-20

## 2020-08-20 DIAGNOSIS — M06.9 RHEUMATOID ARTHRITIS INVOLVING MULTIPLE SITES, UNSPECIFIED RHEUMATOID FACTOR PRESENCE: ICD-10-CM

## 2020-08-20 DIAGNOSIS — M79.2 NEUROPATHIC PAIN: ICD-10-CM

## 2020-08-20 DIAGNOSIS — Z96.651 S/P TOTAL KNEE ARTHROPLASTY, RIGHT: ICD-10-CM

## 2020-08-20 DIAGNOSIS — M25.561 CHRONIC PAIN OF RIGHT KNEE: ICD-10-CM

## 2020-08-20 DIAGNOSIS — F10.10 ALCOHOL ABUSE: ICD-10-CM

## 2020-08-20 DIAGNOSIS — Z98.84 HISTORY OF GASTRIC BYPASS: ICD-10-CM

## 2020-08-20 DIAGNOSIS — M25.569 CHRONIC KNEE PAIN, UNSPECIFIED LATERALITY: ICD-10-CM

## 2020-08-20 DIAGNOSIS — G89.29 CHRONIC KNEE PAIN, UNSPECIFIED LATERALITY: ICD-10-CM

## 2020-08-20 DIAGNOSIS — Z87.11 HISTORY OF GASTRIC ULCER: ICD-10-CM

## 2020-08-20 DIAGNOSIS — S32.592A CLOSED FRACTURE OF RAMUS OF LEFT PUBIS, INITIAL ENCOUNTER (HCC): ICD-10-CM

## 2020-08-20 DIAGNOSIS — G89.29 CHRONIC PAIN OF RIGHT KNEE: ICD-10-CM

## 2020-08-20 DIAGNOSIS — S32.592S CLOSED FRACTURE OF RAMUS OF LEFT PUBIS, SEQUELA: ICD-10-CM

## 2020-08-20 DIAGNOSIS — M17.10 ARTHRITIS OF KNEE: ICD-10-CM

## 2020-08-20 LAB
BACTERIA BLD CULT: NORMAL
BACTERIA BLD CULT: NORMAL
SIGNIFICANT IND 70042: NORMAL
SIGNIFICANT IND 70042: NORMAL
SITE SITE: NORMAL
SITE SITE: NORMAL
SOURCE SOURCE: NORMAL
SOURCE SOURCE: NORMAL

## 2020-08-20 NOTE — TELEPHONE ENCOUNTER
Pt called regarding his Rx for morphine. He said the pharmacy has not received Rx and we will be out of the office when he is ready to .

## 2020-08-20 NOTE — TELEPHONE ENCOUNTER
"Pt would like for you to talk to Dr. Pires  He said \"the day I came in for my visit Dr. Gutierrez and I came to an agreement and was going to prescribed more morphine.   "

## 2020-08-20 NOTE — TELEPHONE ENCOUNTER
As we discussed at the previous visit, your old prescriptions by me as well as the prescriptions from Dr Pires need to last you at least until the end of the prescription from Dr Pires. You had extra pain medication prescribed by me because of your time spent in the hospital. Those medications need to last at least until Dr Arreola prescription. I have also placed a referral to addiction medicine to assist    Dr Gutierrez

## 2020-08-20 NOTE — TELEPHONE ENCOUNTER
As we discussed at the previous visit, your old prescriptions by me as well as the prescriptions from Dr Pires need to last you at least until the end of the prescription from Dr Pires. You had extra pain medication prescribed by me because of your time spent in the hospital. Those medications need to last at least until Dr Arreola prescription. I have also placed a referral to addiction medicine to assist. You can follow up with me next week to discuss further tapering.     Dr Gutierrez

## 2020-08-20 NOTE — TELEPHONE ENCOUNTER
Follow up with your surgery team for post operative pain management. He provided you with 21 days of pain meds starting from 8/10/2020-8/31/2020. Please discuss post op pain meds with your surgeon. Plan to follow up with me next week.     Dr Gutierrez.

## 2020-08-25 DIAGNOSIS — S32.592S CLOSED FRACTURE OF RAMUS OF LEFT PUBIS, SEQUELA: ICD-10-CM

## 2020-08-25 DIAGNOSIS — M06.9 RHEUMATOID ARTHRITIS INVOLVING MULTIPLE SITES, UNSPECIFIED RHEUMATOID FACTOR PRESENCE: ICD-10-CM

## 2020-08-25 DIAGNOSIS — M25.569 CHRONIC KNEE PAIN, UNSPECIFIED LATERALITY: ICD-10-CM

## 2020-08-25 DIAGNOSIS — M25.561 CHRONIC PAIN OF RIGHT KNEE: ICD-10-CM

## 2020-08-25 DIAGNOSIS — Z96.651 S/P TOTAL KNEE ARTHROPLASTY, RIGHT: ICD-10-CM

## 2020-08-25 DIAGNOSIS — M17.10 ARTHRITIS OF KNEE: ICD-10-CM

## 2020-08-25 DIAGNOSIS — G89.29 CHRONIC PAIN OF RIGHT KNEE: ICD-10-CM

## 2020-08-25 DIAGNOSIS — S32.592A CLOSED FRACTURE OF RAMUS OF LEFT PUBIS, INITIAL ENCOUNTER (HCC): ICD-10-CM

## 2020-08-25 DIAGNOSIS — M79.2 NEUROPATHIC PAIN: ICD-10-CM

## 2020-08-25 DIAGNOSIS — G89.29 CHRONIC KNEE PAIN, UNSPECIFIED LATERALITY: ICD-10-CM

## 2020-09-02 ENCOUNTER — OFFICE VISIT (OUTPATIENT)
Dept: PHYSICAL MEDICINE AND REHAB | Facility: MEDICAL CENTER | Age: 45
End: 2020-09-02
Payer: COMMERCIAL

## 2020-09-02 VITALS
TEMPERATURE: 96.8 F | DIASTOLIC BLOOD PRESSURE: 70 MMHG | OXYGEN SATURATION: 99 % | HEART RATE: 74 BPM | BODY MASS INDEX: 32.9 KG/M2 | WEIGHT: 235 LBS | HEIGHT: 71 IN | SYSTOLIC BLOOD PRESSURE: 108 MMHG

## 2020-09-02 DIAGNOSIS — I48.0 PAROXYSMAL ATRIAL FIBRILLATION (HCC): ICD-10-CM

## 2020-09-02 DIAGNOSIS — R53.81 DEBILITY: ICD-10-CM

## 2020-09-02 DIAGNOSIS — Z87.11 HISTORY OF GASTRIC ULCER: ICD-10-CM

## 2020-09-02 DIAGNOSIS — M17.10 ARTHRITIS OF KNEE: ICD-10-CM

## 2020-09-02 DIAGNOSIS — Z98.84 HISTORY OF GASTRIC BYPASS: ICD-10-CM

## 2020-09-02 DIAGNOSIS — M06.9 RHEUMATOID ARTHRITIS INVOLVING MULTIPLE SITES, UNSPECIFIED RHEUMATOID FACTOR PRESENCE: ICD-10-CM

## 2020-09-02 DIAGNOSIS — G89.29 CHRONIC KNEE PAIN, UNSPECIFIED LATERALITY: ICD-10-CM

## 2020-09-02 DIAGNOSIS — Z96.651 S/P TOTAL KNEE ARTHROPLASTY, RIGHT: ICD-10-CM

## 2020-09-02 DIAGNOSIS — M25.561 CHRONIC PAIN OF RIGHT KNEE: ICD-10-CM

## 2020-09-02 DIAGNOSIS — M25.569 CHRONIC KNEE PAIN, UNSPECIFIED LATERALITY: ICD-10-CM

## 2020-09-02 DIAGNOSIS — M79.2 NEUROPATHIC PAIN: ICD-10-CM

## 2020-09-02 DIAGNOSIS — G89.29 CHRONIC PAIN OF RIGHT KNEE: ICD-10-CM

## 2020-09-02 PROCEDURE — 99215 OFFICE O/P EST HI 40 MIN: CPT | Performed by: PHYSICAL MEDICINE & REHABILITATION

## 2020-09-02 RX ORDER — MORPHINE SULFATE 15 MG/1
15 TABLET ORAL EVERY 4 HOURS PRN
Qty: 150 TAB | Refills: 0 | Status: SHIPPED | OUTPATIENT
Start: 2020-10-03 | End: 2020-11-02

## 2020-09-02 RX ORDER — MORPHINE SULFATE 15 MG/1
15 TABLET ORAL EVERY 4 HOURS PRN
Qty: 180 TAB | Refills: 0 | Status: SHIPPED | OUTPATIENT
Start: 2020-09-03 | End: 2020-09-03

## 2020-09-02 RX ORDER — PREGABALIN 150 MG/1
150 CAPSULE ORAL 3 TIMES DAILY
Qty: 90 CAP | Refills: 2 | Status: SHIPPED | OUTPATIENT
Start: 2020-09-09 | End: 2021-01-12 | Stop reason: SDUPTHER

## 2020-09-02 RX ORDER — MORPHINE SULFATE 15 MG/1
15 TABLET ORAL EVERY 4 HOURS PRN
Qty: 120 TAB | Refills: 0 | Status: SHIPPED | OUTPATIENT
Start: 2020-11-02 | End: 2020-11-03 | Stop reason: SDUPTHER

## 2020-09-02 ASSESSMENT — PAIN SCALES - GENERAL: PAINLEVEL: 6=MODERATE PAIN

## 2020-09-02 ASSESSMENT — PATIENT HEALTH QUESTIONNAIRE - PHQ9: CLINICAL INTERPRETATION OF PHQ2 SCORE: 0

## 2020-09-02 ASSESSMENT — FIBROSIS 4 INDEX: FIB4 SCORE: 0.59

## 2020-09-02 NOTE — PROGRESS NOTES
Follow up patient note  Interventional spine and sports physiatry, Physical medicine rehabilitation      Chief complaint:   Chief Complaint   Patient presents with   • Follow-Up     Knee pain         HISTORY    Please see new patient note by Dr Gutierrez,  for more details.     HPI  Patient identification: Richard Hubbard II  1975,    With Diagnoses of Arthritis of knee, Chronic pain of right knee, S/P total knee arthroplasty, right, Chronic knee pain, unspecified laterality, Neuropathic pain, Debility, Paroxysmal atrial fibrillation (HCC), History of gastric bypass, avoid NSAIDs, Rheumatoid arthritis involving multiple sites, unspecified rheumatoid factor presence (HCC), and History of gastric ulcer, avoid NSAIDs were pertinent to this visit.     Patient continues to have severe pain in the right knee.  This is improving.  This is 6-7/10 in intensity.  The patient states he is using morphine IR 15 mg approximately 6 times daily.  This does give the improvement in his pain and without the medication his pain is severe 10 out of 10 in intensity.  He denies side effects of the medication.  He continues to have swelling of the right knee and has been monitored in the emergency department as well as with Dr. Pires the patient's knee surgeon.  Patient does have goals to going down and potentially off of his pain medications in the future.  He discussed that after he was released from the rehab hospital he was taking approximately 14 tabs of morphine IR 15 mg/day.  He stopped using them at this dose after I saw the patient shortly after his discharge.         ROS Red Flags :   Fever, Chills, Sweats: Denies  Involuntary Weight Loss: Denies  Bowel/Bladder Incontinence: Denies  Saddle Anesthesia: Denies        PMHx:   Past Medical History:   Diagnosis Date   • ADD (attention deficit disorder)    • Alcohol abuse    • Allergic rhinitis 2009   • Anemia 2019   • Anxiety 2009   • Arrhythmia     afib when  hospitaized for infection   • Back pain 5/21/2009   • Bipolar disorder ([facility])    • Bleeding ulcer 5/21/2009   • Bleeding ulcer 5/21/2009    History of anemia-now on nexium    • Depression 5/21/2009   • Eczema 5/21/2009   • GERD (gastroesophageal reflux disease)    • Heart murmur     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   • Hemorrhagic disorder ([facility])     hx of bleeding ulcers   • History of bleeding ulcers 2/12/2015   • Insomnia 5/21/2009   • Migraine 5/21/2009   • Obesity, morbid ([facility]) 5/21/2009   • Osteomyelitis ([facility]) 10/7/2019   • Pain 09/2019    Chronic knee and back pain   • Pubic ramus fracture ([facility]) 9/28/2019   • Rheumatoid arteritis ([facility]) 09/2019    knee, feet   • Sleep apnea 5/21/2009    Did not tolerate cpap, does not use it   • Snoring        PSHx:   Past Surgical History:   Procedure Laterality Date   • PB TOTAL KNEE ARTHROPLASTY Right 7/22/2020    Procedure: ARTHROPLASTY, KNEE, TOTAL;  Surgeon: Temo Pires M.D.;  Location: SURGERY [facility];  Service: Orthopedics   • TENDON TRANSFER Right 1/22/2020    Procedure: RIGHT DISTAL ULNA RESECTION AND EXTENSOR TENDON TRANSFER;  Surgeon: Marine Rushing M.D.;  Location: [facility];  Service: Orthopedics   • IRRIGATION & DEBRIDEMENT ORTHO Left 10/9/2019    Procedure: IRRIGATION AND DEBRIDEMENT, WOUND - PELVIC OSTEOMYELITIS;  Surgeon: You Olivares M.D.;  Location: SURGERY [facility];  Service: Orthopedics   • OTHER SURGICAL PROCEDURE  2019    osteomelitis of pelvis cleaned   • GASTRIC BYPASS LAPAROSCOPIC  2000    Lucila en y   • CHOLECYSTECTOMY  2000   • OTHER SURGICAL PROCEDURE      repair of broken penis       Family history   Family History   Problem Relation Age of Onset   • Hypertension Mother    • Arthritis Mother    • Depression Mother    • Cancer Father         bladder   • Schizophrenia Father    • Cancer Maternal Grandmother         breast   • Heart Disease Maternal Grandmother    • Psychiatric Illness Other    • Stroke  Maternal Aunt    • Other Neg Hx         no connective tissue disorders or known aortic disease         Medications:   Outpatient Medications Marked as Taking for the 9/2/20 encounter (Office Visit) with Gerardo Gutierrez M.D.   Medication Sig Dispense Refill   • [START ON 9/3/2020] morphine (MS IR) 15 MG tablet Take 1 Tab by mouth every four hours as needed for Severe Pain (up to six times per day) for up to 30 days. 180 Tab 0   • [START ON 10/3/2020] morphine (MS IR) 15 MG tablet Take 1 Tab by mouth every four hours as needed for Severe Pain (up to 5 times per day) for up to 30 days. 150 Tab 0   • [START ON 11/2/2020] morphine (MS IR) 15 MG tablet Take 1 Tab by mouth every four hours as needed for Severe Pain (up to 4 times per day) for up to 30 days. 120 Tab 0   • [START ON 9/9/2020] pregabalin (LYRICA) 150 MG Cap Take 1 Cap by mouth 3 times a day for 90 days. 90 Cap 2   • ascorbic acid (VITAMIN C) 500 MG tablet Take 1 Tab by mouth 2 times a day, with meals. 60 Tab 0   • aspirin 81 MG EC tablet Take 1 Tab by mouth every day. 90 Tab 2   • ferrous sulfate 325 (65 Fe) MG tablet Take 1 Tab by mouth 2 times a day, with meals. 60 Tab 0   • gabapentin (NEURONTIN) 300 MG Cap Take 1 Cap by mouth 3 times a day. 90 Cap 1   • vitamin D 2000 UNIT Tab Take 1 Tab by mouth every day. 60 Tab    • docusate sodium 100 MG Cap Take 100 mg by mouth 2 Times a Day. 60 Cap    • nicotine (NICODERM) 7 MG/24HR PATCH 24 HR Apply 1 Patch to skin as directed every 24 hours. 30 Patch 3   • acetaminophen (TYLENOL) 500 MG Tab Take 500-1,000 mg by mouth every 6 hours as needed.     • Naloxone (NARCAN) 4 MG/0.1ML Liquid One spray in one nostril for overdose and call 911. (Patient taking differently: Spray 1 Spray in nose as needed. One spray in one nostril for overdose and call 911.) 1 Package 0   • losartan (COZAAR) 25 MG Tab Take 1 Tab by mouth every day. 30 Tab 2   • omeprazole (PRILOSEC) 40 MG delayed-release capsule Take 1 Cap by mouth every  "day. 30 Cap 2        Current Outpatient Medications on File Prior to Visit   Medication Sig Dispense Refill   • ascorbic acid (VITAMIN C) 500 MG tablet Take 1 Tab by mouth 2 times a day, with meals. 60 Tab 0   • aspirin 81 MG EC tablet Take 1 Tab by mouth every day. 90 Tab 2   • ferrous sulfate 325 (65 Fe) MG tablet Take 1 Tab by mouth 2 times a day, with meals. 60 Tab 0   • gabapentin (NEURONTIN) 300 MG Cap Take 1 Cap by mouth 3 times a day. 90 Cap 1   • vitamin D 2000 UNIT Tab Take 1 Tab by mouth every day. 60 Tab    • docusate sodium 100 MG Cap Take 100 mg by mouth 2 Times a Day. 60 Cap    • nicotine (NICODERM) 7 MG/24HR PATCH 24 HR Apply 1 Patch to skin as directed every 24 hours. 30 Patch 3   • acetaminophen (TYLENOL) 500 MG Tab Take 500-1,000 mg by mouth every 6 hours as needed.     • Naloxone (NARCAN) 4 MG/0.1ML Liquid One spray in one nostril for overdose and call 911. (Patient taking differently: Spray 1 Spray in nose as needed. One spray in one nostril for overdose and call 911.) 1 Package 0   • losartan (COZAAR) 25 MG Tab Take 1 Tab by mouth every day. 30 Tab 2   • omeprazole (PRILOSEC) 40 MG delayed-release capsule Take 1 Cap by mouth every day. 30 Cap 2   • zolpidem (AMBIEN) 5 MG Tab Take 1 Tab by mouth at bedtime as needed for up to 30 days. 30 Tab 0     No current facility-administered medications on file prior to visit.          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   Social Needs   • Financial " "resource strain: Not on file   • Food insecurity     Worry: Not on file     Inability: Not on file   • Transportation needs     Medical: Not on file     Non-medical: Not on file   Tobacco Use   • Smoking status: Current Every Day Smoker     Packs/day: 0.25     Years: 3.00     Pack years: 0.75     Types: Cigarettes, Cigars     Last attempt to quit: 2019     Years since quittin.9   • Smokeless tobacco: Never Used   • Tobacco comment: occasional cigar   Substance and Sexual Activity   • Alcohol use: No     Alcohol/week: 0.0 oz     Comment: quit 2.5 yrs ago- past ETOH abuse   • Drug use: No   • Sexual activity: Yes     Partners: Female     Comment: ;    Lifestyle   • Physical activity     Days per week: Not on file     Minutes per session: Not on file   • Stress: Not on file   Relationships   • Social connections     Talks on phone: Not on file     Gets together: Not on file     Attends Baptism service: Not on file     Active member of club or organization: Not on file     Attends meetings of clubs or organizations: Not on file     Relationship status: Not on file   • Intimate partner violence     Fear of current or ex partner: Not on file     Emotionally abused: Not on file     Physically abused: Not on file     Forced sexual activity: Not on file   Other Topics Concern   •  Service No   • Blood Transfusions Yes   • Caffeine Concern No   • Occupational Exposure No   • Hobby Hazards No   • Sleep Concern No   • Stress Concern Yes   • Weight Concern Yes   • Special Diet No   • Back Care No   • Exercise No   • Bike Helmet No   • Seat Belt Yes   • Self-Exams Yes   Social History Narrative    , lives in [place]         EXAMINATION     Physical Exam:   Vitals: /70 (BP Location: Right arm, Patient Position: Sitting, BP Cuff Size: Adult)   Pulse 74   Temp 36 °C (96.8 °F) (Temporal)   Ht 1.803 m (5' 11\")   Wt 106.6 kg (235 lb)   SpO2 99%     Constitutional:   Body Habitus: Body mass " index is 32.78 kg/m².  Cooperation: Fully cooperates with exam  Appearance: Well-groomed no disheveled    Respiratory-  breathing comfortable on room air, no audible wheezing  Cardiovascular-  No lower extremity edema is noted.   Psychiatric- alert and oriented ×3. Normal affect.    MSK/NEURO  There is a well-healing surgical scar of the right knee.  There is swelling around the right knee.        MEDICAL DECISION MAKING    DATA    Labs:   Lab Results   Component Value Date/Time    SODIUM 141 08/14/2020 10:40 PM    POTASSIUM 3.9 08/14/2020 10:40 PM    CHLORIDE 106 08/14/2020 10:40 PM    CO2 29 08/14/2020 10:40 PM    GLUCOSE 74 08/14/2020 10:40 PM    BUN 9 08/14/2020 10:40 PM    CREATININE 0.66 08/14/2020 10:40 PM    CREATININE 0.95 10/19/2010 12:05 PM    BUNCREATRAT 9 10/19/2010 12:05 PM    GLOMRATE >59 10/19/2010 12:05 PM        Lab Results   Component Value Date/Time    PROTHROMBTM 13.7 08/14/2020 10:40 PM    INR 1.04 08/14/2020 10:40 PM        Lab Results   Component Value Date/Time    WBC 4.7 (L) 08/14/2020 10:40 PM    WBC 8.4 10/19/2010 12:05 PM    RBC 3.38 (L) 08/14/2020 10:40 PM    RBC 4.96 10/19/2010 12:05 PM    HEMOGLOBIN 9.6 (L) 08/14/2020 10:40 PM    HEMATOCRIT 31.6 (L) 08/14/2020 10:40 PM    MCV 93.5 08/14/2020 10:40 PM    MCV 85 10/19/2010 12:05 PM    MCH 28.4 08/14/2020 10:40 PM    MCH 28.4 10/19/2010 12:05 PM    MCHC 30.4 (L) 08/14/2020 10:40 PM    MPV 8.6 (L) 08/14/2020 10:40 PM    NEUTSPOLYS 55.50 08/14/2020 10:40 PM    LYMPHOCYTES 30.10 08/14/2020 10:40 PM    MONOCYTES 10.20 08/14/2020 10:40 PM    EOSINOPHILS 3.00 08/14/2020 10:40 PM    BASOPHILS 0.80 08/14/2020 10:40 PM    HYPOCHROMIA 1+ 10/25/2019 04:00 AM    ANISOCYTOSIS 1+ 10/25/2019 04:00 AM        Lab Results   Component Value Date/Time    HBA1C 4.8 10/18/2019 05:18 AM          Imaging:   I personally reviewed following images    MRI pelvis without contrast 10/6/2019 with fracture of the left pubic ramus.    I reviewed the following radiology  reports    MRI pelvis with and without contrast 11/14/2019  Interval improvement in the left parasymphyseal centered edema following subacute fracture with improving muscular and marrow signal     Small fluid collection adjacent to the superior pubic ramus likely is a hematoma. Superinfection is not suspected but cannot be excluded on the basis of imaging alone     Bilateral zone 1 sacral fractures       Results for orders placed during the hospital encounter of 06/07/11   MR-BRAIN-WITH & W/O    Impression 1.  Normal MRI scan of the brain with and without contrast enhancement.                                     Results for orders placed during the hospital encounter of 09/27/19   MR-LUMBAR SPINE-W/O    Impression 1.  Acute bilateral sacral alae fractures.  2.  There is edema in the lower posterior paraspinal soft tissue likely representing soft tissue contusion.  3.  There is no lumbar spine fractures.              Results for orders placed during the hospital encounter of 09/27/19   MR-THORACIC SPINE-W/O    Impression 1.  No acute fracture in the thoracic spine.  2.  Mild chronic compression deformity at T11.        Results for orders placed during the hospital encounter of 09/27/19   MR-LUMBAR SPINE-W/O    Impression 1.  Acute bilateral sacral alae fractures.  2.  There is edema in the lower posterior paraspinal soft tissue likely representing soft tissue contusion.  3.  There is no lumbar spine fractures.                              Results for orders placed during the hospital encounter of 11/14/19   MR-PELVIS-WITH & W/O AND SEQUENCES    Impression Interval improvement in the left parasymphyseal centered edema following subacute fracture with improving muscular and marrow signal    Small fluid collection adjacent to the superior pubic ramus likely is a hematoma. Superinfection is not suspected but cannot be excluded on the basis of imaging alone    Bilateral zone 1 sacral fractures      Results for orders  placed during the hospital encounter of 09/27/19   MR-PELVIS W/O    Impression 1.  Limited exam secondary to significant patient motion.  2.  Subacute fracture involving the left pubic bone with osteomyelitis and probable intraosseous abscess.  3.  Extensive edema and enlargement of the muscles of the medial compartment, pectineus muscle, and pubococcygeus muscle is consistent with known intramuscular abscesses. Fluid collection is most extensive in the adductor longus muscle.  4.  Subacute fractures of the sacrum          Comment: Results given to Dr. Varma through [place] text at 3:56 PM                                                                                                 Results for orders placed during the hospital encounter of 09/27/19   CT-ABDOMEN-PELVIS WITH    Impression 1.  Interval decrease in the intramuscular abscesses involving the left pelvis and thigh following drain placement. Collection in the pubococcygeus muscle is similar in size to the prior exam.    2.  Ventral hernia containing anterior bowel wall and fat. No secondary obstruction.                 Results for orders placed during the hospital encounter of 09/27/19   CT-PELVIS WITH    Impression 1.  Recent but probably subacute fracture of the left superior pubic ramus near the symphysis. Adjacent and just lateral to this are two complex masslike structures, the largest and most medial of which contains a fluid-filled cavity. These are   consistent with hematomata, possibly intramuscular.                  Results for orders placed during the hospital encounter of 08/07/08   DX-ANKLE 3+ VIEWS    Impression IMPRESSION:    NORMAL LEFT ANKLE SERIES.      TRB/sb    Read By ASHLEY HUSAIN MD on Aug  7 2008  6:52PM  : REGGIE Transcription Date: Aug  8 2008 10:10AM  THIS DOCUMENT HAS BEEN ELECTRONICALLY SIGNED BY: ASHLEY HUSAIN MD on   Aug  8 2008  4:42PM               Results for orders placed during the hospital encounter  of 09   DX-CHEST-2 VIEWS    Impression IMPRESSION:    NORMAL CHEST.     JHW/danie          Read By TABITHA SUERO MD on May  7 2009  3:54PM  : [place] Transcription Date: May  7 2009  4:28PM  THIS DOCUMENT HAS BEEN ELECTRONICALLY SIGNED BY: TABITHA SUERO MD on May    7 2009  4:37PM                     Results for orders placed in visit on 16   DX-FOOT-COMPLETE 3+ RIGHT    Impression 1.  Flatfoot deformity.    2.  Mild DJD.        Results for orders placed in visit on 16   DX-HAND 2- RIGHT    Impression Normal exam.                          Results for orders placed during the hospital encounter of 19   DX-LUMBAR SPINE-2 OR 3 VIEWS    Impression Dextroconvex lumbar curvature.  No acute osseous abnormality.  No malalignment.                            Results for orders placed during the hospital encounter of 19   DX-THORACIC SPINE-WITH SWIMMERS VIEW    Impression Mild chronic height loss of the mid thoracic spine from T9 to T11.                   DIAGNOSIS   Visit Diagnoses     ICD-10-CM   1. Arthritis of knee  M17.10   2. Chronic pain of right knee  M25.561    G89.29   3. S/P total knee arthroplasty, right  Z96.651   4. Chronic knee pain, unspecified laterality  M25.569    G89.29   5. Neuropathic pain  M79.2   6. Debility  R53.81   7. Paroxysmal atrial fibrillation (HCC)  I48.0   8. History of gastric bypass, avoid NSAIDs  Z98.84   9. Rheumatoid arthritis involving multiple sites, unspecified rheumatoid factor presence (HCC)  M06.9   10. History of gastric ulcer, avoid NSAIDs  Z87.19         ASSESSMENT and PLAN:     Richard Hubbard II  1975,        1. Arthritis of knee  - morphine (MS IR) 15 MG tablet; Take 1 Tab by mouth every four hours as needed for Severe Pain (up to six times per day) for up to 30 days.  Dispense: 180 Tab; Refill: 0  - morphine (MS IR) 15 MG tablet; Take 1 Tab by mouth every four hours as needed for Severe Pain (up to 5 times per day) for up to  30 days.  Dispense: 150 Tab; Refill: 0  - morphine (MS IR) 15 MG tablet; Take 1 Tab by mouth every four hours as needed for Severe Pain (up to 4 times per day) for up to 30 days.  Dispense: 120 Tab; Refill: 0  - Consent for Opiate Prescription  - Controlled Substance Treatment Agreement  - pregabalin (LYRICA) 150 MG Cap; Take 1 Cap by mouth 3 times a day for 90 days.  Dispense: 90 Cap; Refill: 2    2. Chronic pain of right knee  - morphine (MS IR) 15 MG tablet; Take 1 Tab by mouth every four hours as needed for Severe Pain (up to six times per day) for up to 30 days.  Dispense: 180 Tab; Refill: 0  - morphine (MS IR) 15 MG tablet; Take 1 Tab by mouth every four hours as needed for Severe Pain (up to 5 times per day) for up to 30 days.  Dispense: 150 Tab; Refill: 0  - morphine (MS IR) 15 MG tablet; Take 1 Tab by mouth every four hours as needed for Severe Pain (up to 4 times per day) for up to 30 days.  Dispense: 120 Tab; Refill: 0  - Consent for Opiate Prescription  - Controlled Substance Treatment Agreement  - pregabalin (LYRICA) 150 MG Cap; Take 1 Cap by mouth 3 times a day for 90 days.  Dispense: 90 Cap; Refill: 2    3. S/P total knee arthroplasty, right  - morphine (MS IR) 15 MG tablet; Take 1 Tab by mouth every four hours as needed for Severe Pain (up to six times per day) for up to 30 days.  Dispense: 180 Tab; Refill: 0  - morphine (MS IR) 15 MG tablet; Take 1 Tab by mouth every four hours as needed for Severe Pain (up to 5 times per day) for up to 30 days.  Dispense: 150 Tab; Refill: 0  - morphine (MS IR) 15 MG tablet; Take 1 Tab by mouth every four hours as needed for Severe Pain (up to 4 times per day) for up to 30 days.  Dispense: 120 Tab; Refill: 0  - Consent for Opiate Prescription  - Controlled Substance Treatment Agreement  - pregabalin (LYRICA) 150 MG Cap; Take 1 Cap by mouth 3 times a day for 90 days.  Dispense: 90 Cap; Refill: 2    4. Chronic knee pain, unspecified laterality  - morphine (MS IR) 15  MG tablet; Take 1 Tab by mouth every four hours as needed for Severe Pain (up to six times per day) for up to 30 days.  Dispense: 180 Tab; Refill: 0  - morphine (MS IR) 15 MG tablet; Take 1 Tab by mouth every four hours as needed for Severe Pain (up to 5 times per day) for up to 30 days.  Dispense: 150 Tab; Refill: 0  - morphine (MS IR) 15 MG tablet; Take 1 Tab by mouth every four hours as needed for Severe Pain (up to 4 times per day) for up to 30 days.  Dispense: 120 Tab; Refill: 0  - Consent for Opiate Prescription  - Controlled Substance Treatment Agreement  - pregabalin (LYRICA) 150 MG Cap; Take 1 Cap by mouth 3 times a day for 90 days.  Dispense: 90 Cap; Refill: 2    5. Neuropathic pain  - morphine (MS IR) 15 MG tablet; Take 1 Tab by mouth every four hours as needed for Severe Pain (up to six times per day) for up to 30 days.  Dispense: 180 Tab; Refill: 0  - morphine (MS IR) 15 MG tablet; Take 1 Tab by mouth every four hours as needed for Severe Pain (up to 5 times per day) for up to 30 days.  Dispense: 150 Tab; Refill: 0  - morphine (MS IR) 15 MG tablet; Take 1 Tab by mouth every four hours as needed for Severe Pain (up to 4 times per day) for up to 30 days.  Dispense: 120 Tab; Refill: 0  - Consent for Opiate Prescription  - Controlled Substance Treatment Agreement  - pregabalin (LYRICA) 150 MG Cap; Take 1 Cap by mouth 3 times a day for 90 days.  Dispense: 90 Cap; Refill: 2    6. Debility  - morphine (MS IR) 15 MG tablet; Take 1 Tab by mouth every four hours as needed for Severe Pain (up to six times per day) for up to 30 days.  Dispense: 180 Tab; Refill: 0  - morphine (MS IR) 15 MG tablet; Take 1 Tab by mouth every four hours as needed for Severe Pain (up to 5 times per day) for up to 30 days.  Dispense: 150 Tab; Refill: 0  - morphine (MS IR) 15 MG tablet; Take 1 Tab by mouth every four hours as needed for Severe Pain (up to 4 times per day) for up to 30 days.  Dispense: 120 Tab; Refill: 0  - Consent for  Opiate Prescription  - Controlled Substance Treatment Agreement  - pregabalin (LYRICA) 150 MG Cap; Take 1 Cap by mouth 3 times a day for 90 days.  Dispense: 90 Cap; Refill: 2    7. Paroxysmal atrial fibrillation (HCC)  - pregabalin (LYRICA) 150 MG Cap; Take 1 Cap by mouth 3 times a day for 90 days.  Dispense: 90 Cap; Refill: 2    8. History of gastric bypass, avoid NSAIDs  - pregabalin (LYRICA) 150 MG Cap; Take 1 Cap by mouth 3 times a day for 90 days.  Dispense: 90 Cap; Refill: 2    9. Rheumatoid arthritis involving multiple sites, unspecified rheumatoid factor presence (HCC)  - pregabalin (LYRICA) 150 MG Cap; Take 1 Cap by mouth 3 times a day for 90 days.  Dispense: 90 Cap; Refill: 2    10. History of gastric ulcer, avoid NSAIDs  - pregabalin (LYRICA) 150 MG Cap; Take 1 Cap by mouth 3 times a day for 90 days.  Dispense: 90 Cap; Refill: 2    I discussed the case with Dr. Marcos Harper and Dr. Loi Pisano who took care of the patient while he was in inpatient rehab.  I also discussed the case with Dr. Temo Pires the patient's knee surgeon who stated that the patient's knee surgery was more complicated than typical but this far out there should be a trend towards baseline.  I provided my cell phone number to Dr. Pires in case we need to discuss this challenging patient.       After the knee surgery and after the patient's discharge from acute rehab hospital he was using approximately 14 tabs of morphine IR 15mg per day when he was prescribed 6 tabs of morphine IR 15 mg/day upon discharge.  I discussed the case with Dr. harper he states this was clear in her discussion with the patient as well as on the patient instructions.  Patient states this was a misunderstanding as he thought he was supposed to take 6 tabs greater than what he was currently taking at the time.      I had an extended discussion with the patient about opioid risks and taking above the recommended dose.  The patient states that this was  accidental a misunderstanding.  I had a very clear discussion with the patient of how much she should be taking and we will be tapering down over the next several months.  I placed prescriptions as above.  Also given the patient's history of alcoholism I advised that he follow-up with addictions medicine as I previously placed a referral for this.  Ideally the patient's pain meds will be coming from addictions medicine and coming off of opioid pain medications if possible.     Total time: 43 minutes face-to-face time. I spent greater than 50% of the time for patient care and coordination on this date, including with the patient as per assessment and plan above.          Last opioid risk scale: 9/19/2019   Last urine drug screen: 9/19/2019, consistent. New test ordered 8/25/2020  Last controlled substance agreement: 9/2/2020     reviewed: 9/2/2020   Naloxone prescribed: yes    Opioid Risk Score: 16    Interpretation of Opioid Risk Score   Score 0-3 = Low risk of abuse. Do UDS at least once per year.  Score 4-7 = Moderate risk of abuse. Do UDS 1-4 times per year.  Score 8+ = High risk of abuse. Refer to specialist.     I reviewed the     In prescribing controlled substances to this patient, I certify that I have obtained and reviewed the medical history of Richard Hubbard II. I have also made a good gabe effort to obtain applicable records from other providers who have treated the patient and records did not demonstrate any increased risk of substance abuse that would prevent me from prescribing controlled substances.     I have conducted a physical exam and documented it. I have reviewed Mr. Hubbard’s prescription history as maintained by the [place] Prescription Monitoring Program.     I have assessed the patient’s risk for abuse, dependency, and addiction using the validated Opioid Risk Tool available at https://www.mdcalc.com/lomsgi-ctkh-kzpx-ort-narcotic-abuse.     Given the above, I believe the  benefits of controlled substance therapy outweigh the risks. The reasons for prescribing controlled substances include non-narcotic, oral analgesic alternatives have been inadequate for pain control. Accordingly, I have discussed the risk and benefits, treatment plan, and alternative therapies with the patient.               Follow up: PRN with me    Thank you for allowing me to participate in the care of this patient. If you have any questions please not hesitate to contact me.        Please note that this dictation was created using voice recognition software. I have made every reasonable attempt to correct obvious errors but there may be errors of grammar and content that I may have overlooked prior to finalization of this note.      Gerardo Gutierrez MD  Interventional Spine and Sports Physiatry  Physical Medicine and Rehabilitation  [facility]

## 2020-09-03 ENCOUNTER — TELEPHONE (OUTPATIENT)
Dept: PHYSICAL MEDICINE AND REHAB | Facility: MEDICAL CENTER | Age: 45
End: 2020-09-03

## 2020-09-03 DIAGNOSIS — M25.561 CHRONIC PAIN OF RIGHT KNEE: ICD-10-CM

## 2020-09-03 DIAGNOSIS — Z96.651 S/P TOTAL KNEE ARTHROPLASTY, RIGHT: ICD-10-CM

## 2020-09-03 DIAGNOSIS — G89.29 CHRONIC PAIN OF RIGHT KNEE: ICD-10-CM

## 2020-09-03 DIAGNOSIS — M17.10 ARTHRITIS OF KNEE: ICD-10-CM

## 2020-09-03 DIAGNOSIS — R53.81 DEBILITY: ICD-10-CM

## 2020-09-03 DIAGNOSIS — M79.2 NEUROPATHIC PAIN: ICD-10-CM

## 2020-09-03 DIAGNOSIS — M25.569 CHRONIC KNEE PAIN, UNSPECIFIED LATERALITY: ICD-10-CM

## 2020-09-03 DIAGNOSIS — G89.29 CHRONIC KNEE PAIN, UNSPECIFIED LATERALITY: ICD-10-CM

## 2020-09-03 RX ORDER — MORPHINE SULFATE 15 MG/1
15 TABLET ORAL EVERY 4 HOURS PRN
Qty: 60 TAB | Refills: 0 | Status: SHIPPED | OUTPATIENT
Start: 2020-09-23 | End: 2020-10-03

## 2020-09-03 NOTE — TELEPHONE ENCOUNTER
Gretchen BAIRD ( Pharmacy Tech) in [facility] in [place] called in regards to Pt Rx for Morphine 15mg that is schedule to dispense today for 180 qty, [facility] only have 150 qty on hand to dispense.  They need to have a new Rx for the balance of 60 tabs.  Lorna mentioned that they will have available stock in 2 days.    Please Advise.    Thank you  Arlet

## 2020-09-03 NOTE — TELEPHONE ENCOUNTER
If they are giving him 150 instead of 180 then the difference should be 30. Can you confirm exactly what the pharmacy needs me to write and how many the dispensed today.  Thank you.     Dr Gutierrez

## 2020-09-06 DIAGNOSIS — G47.01 INSOMNIA DUE TO MEDICAL CONDITION: ICD-10-CM

## 2020-09-06 RX ORDER — ZOLPIDEM TARTRATE 5 MG/1
5 TABLET ORAL NIGHTLY PRN
Qty: 30 TAB | Refills: 0 | Status: SHIPPED | OUTPATIENT
Start: 2020-09-06 | End: 2020-10-12 | Stop reason: SDUPTHER

## 2020-09-10 ENCOUNTER — APPOINTMENT (OUTPATIENT)
Dept: BEHAVIORAL HEALTH | Facility: MEDICAL CENTER | Age: 45
End: 2020-09-10
Attending: FAMILY MEDICINE
Payer: COMMERCIAL

## 2020-09-17 RX ORDER — BETAMETHASONE DIPROPIONATE 0.5 MG/G
1 CREAM TOPICAL 2 TIMES DAILY
Qty: 45 G | Refills: 0 | Status: SHIPPED | OUTPATIENT
Start: 2020-09-17 | End: 2021-04-19 | Stop reason: SDUPTHER

## 2020-10-05 ENCOUNTER — HOSPITAL ENCOUNTER (OUTPATIENT)
Dept: RADIOLOGY | Facility: MEDICAL CENTER | Age: 45
End: 2020-10-05
Attending: NURSE PRACTITIONER
Payer: COMMERCIAL

## 2020-10-05 DIAGNOSIS — Z87.81 HISTORY OF FRACTURE: ICD-10-CM

## 2020-10-05 DIAGNOSIS — Z92.25 HISTORY OF IMMUNOSUPPRESSIVE THERAPY: ICD-10-CM

## 2020-10-05 PROCEDURE — 77080 DXA BONE DENSITY AXIAL: CPT

## 2020-10-12 DIAGNOSIS — G47.01 INSOMNIA DUE TO MEDICAL CONDITION: ICD-10-CM

## 2020-10-12 RX ORDER — ZOLPIDEM TARTRATE 5 MG/1
5 TABLET ORAL NIGHTLY PRN
Qty: 30 TAB | Refills: 0 | Status: SHIPPED | OUTPATIENT
Start: 2020-10-12 | End: 2020-11-10 | Stop reason: SDUPTHER

## 2020-10-12 RX ORDER — OMEPRAZOLE 40 MG/1
40 CAPSULE, DELAYED RELEASE ORAL DAILY
Qty: 30 CAP | Refills: 6 | Status: SHIPPED | OUTPATIENT
Start: 2020-10-12 | End: 2021-08-06 | Stop reason: SDUPTHER

## 2020-10-14 ENCOUNTER — TELEMEDICINE (OUTPATIENT)
Dept: MEDICAL GROUP | Facility: MEDICAL CENTER | Age: 45
End: 2020-10-14
Payer: COMMERCIAL

## 2020-10-14 DIAGNOSIS — R10.30 LOWER ABDOMINAL PAIN: ICD-10-CM

## 2020-10-14 DIAGNOSIS — G47.01 INSOMNIA DUE TO MEDICAL CONDITION: ICD-10-CM

## 2020-10-14 DIAGNOSIS — M81.6 LOCALIZED OSTEOPOROSIS WITHOUT CURRENT PATHOLOGICAL FRACTURE: ICD-10-CM

## 2020-10-14 PROCEDURE — 99214 OFFICE O/P EST MOD 30 MIN: CPT | Mod: 95,CR | Performed by: NURSE PRACTITIONER

## 2020-10-14 NOTE — PROGRESS NOTES
Telemedicine Video Visit: Established Patient   This Remote Face to Face encounter was conducted via Zoom. Given the importance of social distancing and other strategies recommended to reduce the risk of COVID-19 transmission, I am providing medical care to this patient via audio/video visit in place of an in person visit at the request of the patient. Verbal consent to telehealth, risks, benefits, and consequences were discussed. Patient retains the right to withdraw at any time. All existing confidentiality protections apply. The patient has access to all transmitted medical information. No dissemination of any patient images or information to other entities without further written consent.  Subjective:     Chief Complaint   Patient presents with   • Lab Results       Richard Hubbard II is a 45 y.o. male presenting for evaluation and management of:      Osteoporosis  Patient is here to review his most recent bone density scan.  He is osteoporotic on his left femur.  Osteopenia in lumbar spine.  He does have a history of having a Lucila-en-Y back in 2000.  This would make him not a candidate to take the oral bisphosphonates.  Recommend treatment for this.  He states that he has been discussing this as well with his endocrinologist.  He will follow-up with his endocrinologist for treatment.    DEXA, 10/5/2020  According to the World Health Organization classification, bone mineral density of this patient is osteoporotic for the proximal left femur and osteopenic for the lumbar spine and normal for the distal left forearm.     10-year Probability of Fracture:  Major Osteoporotic     15.8%  Hip     9.7%  Population      [place] ()     Based on left femur neck BMD    Incisional hernia  Patient states that he has been having what appears to him as a hernia on his previous incision site from his Lucila-en-Y back in 2000.  He does have some pain and intermittent constipation.  Has been present for about 1 year.   "Over the last few months it is worsened.      Insomnia  Patient has been using Ambien most recently status post his right knee replacement.  States is been difficult for him to fall asleep due to his chronic aches and pains.  Have discussed this as well as the concern for oversedation because he also takes pain medications.  He is hopeful to stop using this medication in the near future.      He makes mention that he likely needs ankle surgery ever since correcting his right knee his ankle has been more problematic.      ROS difficulty sleeping, possible hernia ankle pain.  Denies any recent fevers or chills. No nausea or vomiting. No chest pains or shortness of breath.     Allergies   Allergen Reactions   • Benadryl [Altaryl]      \"I get agitated\"   • Nsaids      Bleeding, \"I had a bleeding ulcer\"   • Phenergan [Promethazine Hcl]      \"I get very agitated\"   • Penicillins      \"Had some dizziness\"  Tolerated Unasyn 10/2019       Current medicines (including changes today)  Current Outpatient Medications   Medication Sig Dispense Refill   • omeprazole (PRILOSEC) 40 MG delayed-release capsule Take 1 Cap by mouth every day. 30 Cap 6   • zolpidem (AMBIEN) 5 MG Tab Take 1 Tab by mouth at bedtime as needed for up to 30 days. 30 Tab 0   • betamethasone dipropionate 0.05 % Cream Apply 1 g to affected area(s) 2 times a day. 45 g 0   • morphine (MS IR) 15 MG tablet Take 1 Tab by mouth every four hours as needed for Severe Pain (up to 5 times per day) for up to 30 days. 150 Tab 0   • pregabalin (LYRICA) 150 MG Cap Take 1 Cap by mouth 3 times a day for 90 days. 90 Cap 2   • ascorbic acid (VITAMIN C) 500 MG tablet Take 1 Tab by mouth 2 times a day, with meals. 60 Tab 0   • aspirin 81 MG EC tablet Take 1 Tab by mouth every day. 90 Tab 2   • ferrous sulfate 325 (65 Fe) MG tablet Take 1 Tab by mouth 2 times a day, with meals. 60 Tab 0   • gabapentin (NEURONTIN) 300 MG Cap Take 1 Cap by mouth 3 times a day. 90 Cap 1   • vitamin D " 2000 UNIT Tab Take 1 Tab by mouth every day. 60 Tab    • docusate sodium 100 MG Cap Take 100 mg by mouth 2 Times a Day. 60 Cap    • acetaminophen (TYLENOL) 500 MG Tab Take 500-1,000 mg by mouth every 6 hours as needed.     • Naloxone (NARCAN) 4 MG/0.1ML Liquid One spray in one nostril for overdose and call 911. (Patient taking differently: Spray 1 Spray in nose as needed. One spray in one nostril for overdose and call 911.) 1 Package 0   • losartan (COZAAR) 25 MG Tab Take 1 Tab by mouth every day. 30 Tab 2   • DULoxetine (CYMBALTA) 30 MG Cap DR Particles Take 1 Cap by mouth every day. 30 Cap 2   • [START ON 11/2/2020] morphine (MS IR) 15 MG tablet Take 1 Tab by mouth every four hours as needed for Severe Pain (up to 4 times per day) for up to 30 days. (Patient not taking: Reported on 10/14/2020) 120 Tab 0   • nicotine (NICODERM) 7 MG/24HR PATCH 24 HR Apply 1 Patch to skin as directed every 24 hours. (Patient not taking: Reported on 10/14/2020) 30 Patch 3     No current facility-administered medications for this visit.        Patient Active Problem List    Diagnosis Date Noted   • Acute blood loss anemia 07/23/2020     Priority: High   • Atrial fibrillation with rapid ventricular response (HCC) 11/05/2019     Priority: High   • Paroxysmal atrial fibrillation (HCC) 07/23/2020     Priority: Low   • Pubic ramus fracture (HCC) 09/28/2019     Priority: Low   • Essential hypertension 09/13/2019     Priority: Low   • Rheumatoid arthritis (HCC) 09/08/2015     Priority: Low   • Chronic pain of right knee 09/08/2015     Priority: Low   • Localized osteoporosis without current pathological fracture 10/19/2020   • History of osteomyelitis 08/14/2020   • History of immunosuppressive therapy 08/14/2020   • S/P total knee arthroplasty, right 07/27/2020   • Cigarette nicotine dependence without complication 07/21/2020   • Positive occult stool blood test 09/17/2019   • Aortic root dilatation (HCC) 09/13/2019   • Elevated alkaline  "phosphatase level 09/13/2019   • Aortic stenosis, moderate 09/12/2019   • Iron deficiency anemia, unspecified iron deficiency anemia type 08/15/2019   • Localized edema 08/15/2019   • Aortic ejection murmur 03/08/2019   • Pagophagia 11/13/2017   • Adult ADHD 09/08/2010   • MONICA (obstructive sleep apnea) 03/09/2010   • Mood disorder (HCC) 05/21/2009   • Anxiety disorder 05/21/2009   • Eczema 05/21/2009       Family History   Problem Relation Age of Onset   • Hypertension Mother    • Arthritis Mother    • Depression Mother    • Cancer Father         bladder   • Schizophrenia Father    • Cancer Maternal Grandmother         breast   • Heart Disease Maternal Grandmother    • Psychiatric Illness Other    • Stroke Maternal Aunt    • Other Neg Hx         no connective tissue disorders or known aortic disease       He  has a past medical history of ADD (attention deficit disorder), Alcohol abuse, Allergic rhinitis (5/21/2009), Anemia (09/2019), Anxiety (5/21/2009), Arrhythmia, Back pain (5/21/2009), Bipolar disorder ([facility]), Bleeding ulcer (5/21/2009), Bleeding ulcer (5/21/2009), Depression (5/21/2009), Eczema (5/21/2009), GERD (gastroesophageal reflux disease), Heart murmur, Hemorrhagic disorder ([facility]), History of bleeding ulcers (2/12/2015), Insomnia (5/21/2009), Migraine (5/21/2009), Obesity, morbid ([facility]) (5/21/2009), Osteomyelitis ([facility]) (10/7/2019), Pain (09/2019), Pubic ramus fracture ([facility]) (9/28/2019), Rheumatoid arteritis ([facility]) (09/2019), Sleep apnea (5/21/2009), and Snoring. He also has no past medical history of Hyperlipidemia.  He  has a past surgical history that includes gastric bypass laparoscopic (2000); cholecystectomy (2000); irrigation & debridement ortho (Left, 10/9/2019); tendon transfer (Right, 1/22/2020); other surgical procedure; other surgical procedure (2019); and pr total knee arthroplasty (Right, 7/22/2020).       Objective:   Vitals obtained by patient:  Pulse 75   Resp 14   Ht 1.803 m (5' 11\")   " BMI 32.78 kg/m²     Physical Exam  Constitutional: Alert, no distress, well-groomed.  Skin: No rashes in visible areas.  Eye: Round. Conjunctiva clear, lids normal. No icterus.   ENMT: Lips pink without lesions, good dentition, moist mucous membranes. Phonation normal.  Neck: No masses, no thyromegaly. Moves freely without pain.  CV: Pulse as reported by patient  Respiratory: Unlabored respiratory effort, no cough or audible wheeze  Psych: Alert and oriented x3, normal affect and mood.       Assessment and Plan:   The following treatment plan was discussed:     1. Localized osteoporosis without current pathological fracture  New finding on most recent bone density scan.  Not a candidate for oral bisphosphonates due to history of Lucila-en-Y back in 2000.  Have reviewed possible options for the patient.  He has chosen to follow-up with endocrinology for treatment.      2. Lower abdominal pain  Unable to do an exam as this was a virtual visit.  Patient does have a large incision on his abdomen and his complaints could very well indicate a hernia.  Have ordered an ultrasound.  - US-ABDOMEN LTD (SOFT TISSUE); Future    3. Insomnia due to medical condition  Have discussed with patient that long-term use of Ambien along with narcotics is not recommended.  We will continue to monitor this monthly.  Caution regarding oversedation.  Patient states that he is very cautious regarding this.      Follow-up: No follow-ups on file.    Face to Face Video Visit:     MARTÍN Turner

## 2020-10-15 ENCOUNTER — HOSPITAL ENCOUNTER (OUTPATIENT)
Dept: BEHAVIORAL HEALTH | Facility: MEDICAL CENTER | Age: 45
End: 2020-10-15
Attending: FAMILY MEDICINE
Payer: COMMERCIAL

## 2020-10-15 ENCOUNTER — DOCUMENTATION (OUTPATIENT)
Dept: BEHAVIORAL HEALTH | Facility: CLINIC | Age: 45
End: 2020-10-15

## 2020-10-15 VITALS
HEART RATE: 78 BPM | SYSTOLIC BLOOD PRESSURE: 124 MMHG | BODY MASS INDEX: 32.9 KG/M2 | WEIGHT: 235 LBS | HEIGHT: 71 IN | TEMPERATURE: 97.8 F | DIASTOLIC BLOOD PRESSURE: 84 MMHG | RESPIRATION RATE: 18 BRPM

## 2020-10-15 DIAGNOSIS — F39 MOOD DISORDER (HCC): Chronic | ICD-10-CM

## 2020-10-15 DIAGNOSIS — G89.4 CHRONIC PAIN DISORDER: ICD-10-CM

## 2020-10-15 DIAGNOSIS — M06.9 RHEUMATOID ARTHRITIS INVOLVING MULTIPLE SITES, UNSPECIFIED WHETHER RHEUMATOID FACTOR PRESENT (HCC): Chronic | ICD-10-CM

## 2020-10-15 PROBLEM — E66.9 OBESITY (BMI 35.0-39.9 WITHOUT COMORBIDITY): Chronic | Status: RESOLVED | Noted: 2018-08-09 | Resolved: 2020-10-15

## 2020-10-15 PROCEDURE — 99204 OFFICE O/P NEW MOD 45 MIN: CPT | Performed by: FAMILY MEDICINE

## 2020-10-15 RX ORDER — DULOXETIN HYDROCHLORIDE 30 MG/1
30 CAPSULE, DELAYED RELEASE ORAL DAILY
Qty: 30 CAP | Refills: 2 | Status: SHIPPED | OUTPATIENT
Start: 2020-10-15 | End: 2022-08-16 | Stop reason: CLARIF

## 2020-10-15 ASSESSMENT — ENCOUNTER SYMPTOMS
EYES NEGATIVE: 1
GASTROINTESTINAL NEGATIVE: 1
NERVOUS/ANXIOUS: 1
CHILLS: 0
PALPITATIONS: 0
RESPIRATORY NEGATIVE: 1
HEMOPTYSIS: 0
HEADACHES: 0
MYALGIAS: 0
NAUSEA: 0
CONSTITUTIONAL NEGATIVE: 1
NEUROLOGICAL NEGATIVE: 1
BACK PAIN: 1
DOUBLE VISION: 0
FALLS: 1
TINGLING: 0
COUGH: 0
CARDIOVASCULAR NEGATIVE: 1
FEVER: 0
BRUISES/BLEEDS EASILY: 0
DEPRESSION: 1
HEARTBURN: 0
BLURRED VISION: 0
DIZZINESS: 0

## 2020-10-15 ASSESSMENT — LIFESTYLE VARIABLES: SUBSTANCE_ABUSE: 1

## 2020-10-15 ASSESSMENT — FIBROSIS 4 INDEX: FIB4 SCORE: 0.59

## 2020-10-15 NOTE — PROGRESS NOTES
Subjective:      Richard Hubbard II is a 45 y.o. male who presents with No chief complaint on file.            1. Mood disorder (HCC)  Patient is a 45 year old wm who is the primary caregiver for his two children and whose wife works. He has a history of bipolar disorder and is not on any medications and also had a history of opiate pill abuse 7 years ago. He went into a one month rehab and was able to not take them for 7 years. Last fall he fell at a store and had a pelvic fracture that required surgery and had a 5 month stay in the hospital and rehab for this. He also had a right knee replacement in July  Starting in the fall with his pelvic fracture he was started on opiates to help with the pain and is describing difficulty in decreasing his dose and coming off of them. He has a right ankle surgery for a tendon pending in the near future  He is currently on morphine 15 mg ir 5 times per day recently decreased from 6 per day starting 1 week ago. He denies any n/v/diarrhea or sweating just the sensation of more pain in his joints and back with the decreased dose  It is his desire to decrease his dose and eventually be off of opiates again  We had a long discussion about this and given his past history would recommend a very slow taper off for his best chance of success  We discussed a 5 times per day dose for October and November and then 4x/day for 2 months then 3x/day for 2 months then 2x/day, etc.  He is agreeable to this plan and will f/u with me in 5 weeks  He admits to some decreased mood and self-deprecation due to his situation and I think that cymbalta would help both for his mood and his chronic pain issues  - DULoxetine (CYMBALTA) 30 MG Cap DR Particles; Take 1 Cap by mouth every day.  Dispense: 30 Cap; Refill: 2    2. Chronic pain disorder    - Consent for Opiate Prescription    3. Rheumatoid arthritis involving multiple sites, unspecified whether rheumatoid factor present ([facility])      Past Medical  History:  No date: ADD (attention deficit disorder)  No date: Alcohol abuse  5/21/2009: Allergic rhinitis  09/2019: Anemia  5/21/2009: Anxiety  No date: Arrhythmia      Comment:  afib when hospitaized for infection  5/21/2009: Back pain  No date: Bipolar disorder (HCC)  5/21/2009: Bleeding ulcer  5/21/2009: Bleeding ulcer      Comment:  History of anemia-now on nexium   5/21/2009: Depression  5/21/2009: Eczema  No date: GERD (gastroesophageal reflux disease)  No date: Heart murmur      Comment:  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  No date: Hemorrhagic disorder ([facility])      Comment:  hx of bleeding ulcers  2/12/2015: History of bleeding ulcers  5/21/2009: Insomnia  5/21/2009: Migraine  5/21/2009: Obesity, morbid ([facility])  10/7/2019: Osteomyelitis ([facility])  09/2019: Pain      Comment:  Chronic knee and back pain  9/28/2019: Pubic ramus fracture ([facility])  09/2019: Rheumatoid arteritis ([facility])      Comment:  knee, feet  5/21/2009: Sleep apnea      Comment:  Did not tolerate cpap, does not use it  No date: Snoring  Past Surgical History:  7/22/2020: PB TOTAL KNEE ARTHROPLASTY; Right      Comment:  Procedure: ARTHROPLASTY, KNEE, TOTAL;  Surgeon: Temo Pires M.D.;  Location: [facility];                 Service: Orthopedics  1/22/2020: TENDON TRANSFER; Right      Comment:  Procedure: RIGHT DISTAL ULNA RESECTION AND EXTENSOR                TENDON TRANSFER;  Surgeon: Marine Rushing M.D.;                 Location: [facility];  Service: Orthopedics  10/9/2019: IRRIGATION & DEBRIDEMENT ORTHO; Left      Comment:  Procedure: IRRIGATION AND DEBRIDEMENT, WOUND - PELVIC                OSTEOMYELITIS;  Surgeon: You Olivares M.D.;                 Location: [facility];  Service:                Orthopedics  2019: OTHER SURGICAL PROCEDURE      Comment:  osteomelitis of pelvis cleaned  2000: GASTRIC BYPASS LAPAROSCOPIC      Comment:  Lucila en y  2000: CHOLECYSTECTOMY  No date: OTHER  SURGICAL PROCEDURE      Comment:  repair of broken penis  Social History    Tobacco Use      Smoking status: Current Every Day Smoker        Packs/day: 0.25        Years: 3.00        Pack years: .75        Types: Cigarettes, Cigars        Quit date: 2019        Years since quittin.0      Smokeless tobacco: Never Used      Tobacco comment: occasional cigar    Alcohol use: No      Alcohol/week: 0.0 oz      Comment: quit 2.5 yrs ago- past ETOH abuse    Drug use: No    Review of patient's family history indicates:  Problem: Hypertension      Relation: Mother          Age of Onset: (Not Specified)  Problem: Arthritis      Relation: Mother          Age of Onset: (Not Specified)  Problem: Depression      Relation: Mother          Age of Onset: (Not Specified)  Problem: Cancer      Relation: Father          Age of Onset: (Not Specified)          Comment: bladder  Problem: Schizophrenia      Relation: Father          Age of Onset: (Not Specified)  Problem: Cancer      Relation: Maternal Grandmother          Age of Onset: (Not Specified)          Comment: breast  Problem: Heart Disease      Relation: Maternal Grandmother          Age of Onset: (Not Specified)  Problem: Psychiatric Illness      Relation: Other          Age of Onset: (Not Specified)  Problem: Stroke      Relation: Maternal Aunt          Age of Onset: (Not Specified)  Problem: Other      Relation: Neg Hx          Age of Onset: (Not Specified)          Comment: no connective tissue disorders or known aortic disease      Current Outpatient Medications: •  DULoxetine (CYMBALTA) 30 MG Cap DR Particles, Take 1 Cap by mouth every day., Disp: 30 Cap, Rfl: 2•  omeprazole (PRILOSEC) 40 MG delayed-release capsule, Take 1 Cap by mouth every day., Disp: 30 Cap, Rfl: 6•  zolpidem (AMBIEN) 5 MG Tab, Take 1 Tab by mouth at bedtime as needed for up to 30 days., Disp: 30 Tab, Rfl: 0•  betamethasone dipropionate 0.05 % Cream, Apply 1 g to affected area(s) 2 times a day.,  Disp: 45 g, Rfl: 0•  morphine (MS IR) 15 MG tablet, Take 1 Tab by mouth every four hours as needed for Severe Pain (up to 5 times per day) for up to 30 days., Disp: 150 Tab, Rfl: 0•  (START ON 11/2/2020) morphine (MS IR) 15 MG tablet, Take 1 Tab by mouth every four hours as needed for Severe Pain (up to 4 times per day) for up to 30 days. (Patient not taking: Reported on 10/14/2020), Disp: 120 Tab, Rfl: 0•  pregabalin (LYRICA) 150 MG Cap, Take 1 Cap by mouth 3 times a day for 90 days., Disp: 90 Cap, Rfl: 2•  ascorbic acid (VITAMIN C) 500 MG tablet, Take 1 Tab by mouth 2 times a day, with meals., Disp: 60 Tab, Rfl: 0•  aspirin 81 MG EC tablet, Take 1 Tab by mouth every day., Disp: 90 Tab, Rfl: 2 •  ferrous sulfate 325 (65 Fe) MG tablet, Take 1 Tab by mouth 2 times a day, with meals., Disp: 60 Tab, Rfl: 0•  gabapentin (NEURONTIN) 300 MG Cap, Take 1 Cap by mouth 3 times a day., Disp: 90 Cap, Rfl: 1•  vitamin D 2000 UNIT Tab, Take 1 Tab by mouth every day., Disp: 60 Tab, Rfl: •  docusate sodium 100 MG Cap, Take 100 mg by mouth 2 Times a Day., Disp: 60 Cap, Rfl: •  nicotine (NICODERM) 7 MG/24HR PATCH 24 HR, Apply 1 Patch to skin as directed every 24 hours. (Patient not taking: Reported on 10/14/2020), Disp: 30 Patch, Rfl: 3•  acetaminophen (TYLENOL) 500 MG Tab, Take 500-1,000 mg by mouth every 6 hours as needed., Disp: , Rfl: •  Naloxone (NARCAN) 4 MG/0.1ML Liquid, One spray in one nostril for overdose and call 911. (Patient taking differently: Spray 1 Spray in nose as needed. One spray in one nostril for overdose and call 911.), Disp: 1 Package, Rfl: 0•  losartan (COZAAR) 25 MG Tab, Take 1 Tab by mouth every day., Disp: 30 Tab, Rfl: 2    Patient was instructed on the use of medications, either prescriptions or OTC and informed on when the appropriate follow up time period should be. In addition, patient was also instructed that should any acute worsening occur that they should notify this clinic asap or call  "911.          Review of Systems   Constitutional: Negative.  Negative for chills and fever.   HENT: Negative.  Negative for hearing loss.    Eyes: Negative.  Negative for blurred vision and double vision.   Respiratory: Negative.  Negative for cough and hemoptysis.    Cardiovascular: Negative.  Negative for chest pain and palpitations.   Gastrointestinal: Negative.  Negative for heartburn and nausea.   Genitourinary: Negative.  Negative for dysuria.   Musculoskeletal: Positive for back pain, falls and joint pain. Negative for myalgias.   Skin: Negative.  Negative for rash.   Neurological: Negative.  Negative for dizziness, tingling and headaches.   Endo/Heme/Allergies: Negative.  Does not bruise/bleed easily.   Psychiatric/Behavioral: Positive for depression and substance abuse. Negative for suicidal ideas. The patient is nervous/anxious.    All other systems reviewed and are negative.         Objective:     /84   Pulse 78   Temp 36.6 °C (97.8 °F)   Resp 18   Ht 1.803 m (5' 11\")   Wt 106.6 kg (235 lb)   BMI 32.78 kg/m²      Physical Exam  Vitals signs and nursing note reviewed.   Constitutional:       General: He is not in acute distress.     Appearance: He is well-developed. He is not diaphoretic.   HENT:      Head: Normocephalic and atraumatic.      Mouth/Throat:      Pharynx: No oropharyngeal exudate.   Eyes:      Pupils: Pupils are equal, round, and reactive to light.   Cardiovascular:      Rate and Rhythm: Normal rate and regular rhythm.      Heart sounds: Normal heart sounds. No murmur. No friction rub. No gallop.    Pulmonary:      Effort: Pulmonary effort is normal. No respiratory distress.      Breath sounds: Normal breath sounds. No wheezing or rales.   Chest:      Chest wall: No tenderness.   Musculoskeletal:      Right knee: He exhibits decreased range of motion.      Right ankle: He exhibits decreased range of motion. Tenderness. Medial malleolus tenderness found.   Neurological:      Mental " Status: He is alert and oriented to person, place, and time.   Psychiatric:         Behavior: Behavior normal.         Thought Content: Thought content normal.         Judgment: Judgment normal.                 Assessment/Plan:     1. Mood disorder (HCC)  Patient is a 45 year old wm who is the primary caregiver for his two children and whose wife works. He has a history of bipolar disorder and is not on any medications and also had a history of opiate pill abuse 7 years ago. He went into a one month rehab and was able to not take them for 7 years. Last fall he fell at a store and had a pelvic fracture that required surgery and had a 5 month stay in the hospital and rehab for this. He also had a right knee replacement in July  Starting in the fall with his pelvic fracture he was started on opiates to help with the pain and is describing difficulty in decreasing his dose and coming off of them. He has a right ankle surgery for a tendon pending in the near future  He is currently on morphine 15 mg ir 5 times per day recently decreased from 6 per day starting 1 week ago. He denies any n/v/diarrhea or sweating just the sensation of more pain in his joints and back with the decreased dose  It is his desire to decrease his dose and eventually be off of opiates again  We had a long discussion about this and given his past history would recommend a very slow taper off for his best chance of success  We discussed a 5 times per day dose for October and November and then 4x/day for 2 months then 3x/day for 2 months then 2x/day, etc.  He is agreeable to this plan and will f/u with me in 5 weeks  He admits to some decreased mood and self-deprecation due to his situation and I think that cymbalta would help both for his mood and his chronic pain issues  - DULoxetine (CYMBALTA) 30 MG Cap DR Particles; Take 1 Cap by mouth every day.  Dispense: 30 Cap; Refill: 2    2. Chronic pain disorder    - Consent for Opiate Prescription    3.  Rheumatoid arthritis involving multiple sites, unspecified whether rheumatoid factor present (HCC)

## 2020-10-19 VITALS — BODY MASS INDEX: 32.78 KG/M2 | RESPIRATION RATE: 14 BRPM | HEART RATE: 75 BPM | HEIGHT: 71 IN

## 2020-10-19 PROBLEM — M81.6 LOCALIZED OSTEOPOROSIS WITHOUT CURRENT PATHOLOGICAL FRACTURE: Status: ACTIVE | Noted: 2020-10-19

## 2020-10-20 ENCOUNTER — TELEMEDICINE (OUTPATIENT)
Dept: CARDIOLOGY | Facility: MEDICAL CENTER | Age: 45
End: 2020-10-20
Payer: COMMERCIAL

## 2020-10-20 VITALS — HEIGHT: 71 IN | BODY MASS INDEX: 32.78 KG/M2

## 2020-10-20 DIAGNOSIS — I35.0 AORTIC STENOSIS, MODERATE: Chronic | ICD-10-CM

## 2020-10-20 PROCEDURE — 99214 OFFICE O/P EST MOD 30 MIN: CPT | Mod: 95,CR | Performed by: INTERNAL MEDICINE

## 2020-10-20 RX ORDER — LOSARTAN POTASSIUM 25 MG/1
25 TABLET ORAL DAILY
Qty: 90 TAB | Refills: 3 | Status: SHIPPED | OUTPATIENT
Start: 2020-10-20 | End: 2023-01-01

## 2020-10-20 NOTE — PROGRESS NOTES
"Telemedicine: Established Patient   This evaluation was conducted via Zoom using secure and encrypted videoconferencing technology. The patient was in a private location in the state of [place].    The patient's identity was confirmed and verbal consent was obtained for this virtual visit.    Subjective:   CC:   Chief Complaint   Patient presents with   • Atrial Fibrillation     VOD DX:PAF       Richard Hubbard II is a 45 y.o. male presenting for evaluation and management of:    Afib, Aortic stenosis    No Afib episodes since last visit. No palpitations. No SOB on exertion no presyncope. No Chest pain.    ROS   12 point ROS performed, all systems negative       Allergies   Allergen Reactions   • Benadryl [Altaryl]      \"I get agitated\"   • Nsaids      Bleeding, \"I had a bleeding ulcer\"   • Phenergan [Promethazine Hcl]      \"I get very agitated\"   • Penicillins      \"Had some dizziness\"  Tolerated Unasyn 10/2019       Current medicines (including changes today)  Current Outpatient Medications   Medication Sig Dispense Refill   • losartan (COZAAR) 25 MG Tab Take 1 Tab by mouth every day. 90 Tab 3   • DULoxetine (CYMBALTA) 30 MG Cap DR Particles Take 1 Cap by mouth every day. 30 Cap 2   • omeprazole (PRILOSEC) 40 MG delayed-release capsule Take 1 Cap by mouth every day. 30 Cap 6   • zolpidem (AMBIEN) 5 MG Tab Take 1 Tab by mouth at bedtime as needed for up to 30 days. 30 Tab 0   • betamethasone dipropionate 0.05 % Cream Apply 1 g to affected area(s) 2 times a day. 45 g 0   • morphine (MS IR) 15 MG tablet Take 1 Tab by mouth every four hours as needed for Severe Pain (up to 5 times per day) for up to 30 days. 150 Tab 0   • pregabalin (LYRICA) 150 MG Cap Take 1 Cap by mouth 3 times a day for 90 days. 90 Cap 2   • ascorbic acid (VITAMIN C) 500 MG tablet Take 1 Tab by mouth 2 times a day, with meals. 60 Tab 0   • aspirin 81 MG EC tablet Take 1 Tab by mouth every day. 90 Tab 2   • ferrous sulfate 325 (65 Fe) MG tablet " Take 1 Tab by mouth 2 times a day, with meals. 60 Tab 0   • gabapentin (NEURONTIN) 300 MG Cap Take 1 Cap by mouth 3 times a day. 90 Cap 1   • vitamin D 2000 UNIT Tab Take 1 Tab by mouth every day. 60 Tab    • docusate sodium 100 MG Cap Take 100 mg by mouth 2 Times a Day. 60 Cap    • acetaminophen (TYLENOL) 500 MG Tab Take 500-1,000 mg by mouth every 6 hours as needed.     • [START ON 11/2/2020] morphine (MS IR) 15 MG tablet Take 1 Tab by mouth every four hours as needed for Severe Pain (up to 4 times per day) for up to 30 days. (Patient not taking: Reported on 10/14/2020) 120 Tab 0   • nicotine (NICODERM) 7 MG/24HR PATCH 24 HR Apply 1 Patch to skin as directed every 24 hours. (Patient not taking: Reported on 10/14/2020) 30 Patch 3   • Naloxone (NARCAN) 4 MG/0.1ML Liquid One spray in one nostril for overdose and call 911. (Patient taking differently: Spray 1 Spray in nose as needed. One spray in one nostril for overdose and call 911.) 1 Package 0     No current facility-administered medications for this visit.        Patient Active Problem List    Diagnosis Date Noted   • Acute blood loss anemia 07/23/2020     Priority: High   • Atrial fibrillation with rapid ventricular response (HCC) 11/05/2019     Priority: High   • Paroxysmal atrial fibrillation (HCC) 07/23/2020     Priority: Low   • Pubic ramus fracture (HCC) 09/28/2019     Priority: Low   • Essential hypertension 09/13/2019     Priority: Low   • Rheumatoid arthritis (HCC) 09/08/2015     Priority: Low   • Chronic pain of right knee 09/08/2015     Priority: Low   • Localized osteoporosis without current pathological fracture 10/19/2020   • History of osteomyelitis 08/14/2020   • History of immunosuppressive therapy 08/14/2020   • S/P total knee arthroplasty, right 07/27/2020   • Cigarette nicotine dependence without complication 07/21/2020   • Positive occult stool blood test 09/17/2019   • Aortic root dilatation (HCC) 09/13/2019   • Elevated alkaline phosphatase  "level 09/13/2019   • Aortic stenosis, moderate 09/12/2019   • Iron deficiency anemia, unspecified iron deficiency anemia type 08/15/2019   • Localized edema 08/15/2019   • Aortic ejection murmur 03/08/2019   • Pagophagia 11/13/2017   • Adult ADHD 09/08/2010   • MONICA (obstructive sleep apnea) 03/09/2010   • Mood disorder (HCC) 05/21/2009   • Anxiety disorder 05/21/2009   • Eczema 05/21/2009       Family History   Problem Relation Age of Onset   • Hypertension Mother    • Arthritis Mother    • Depression Mother    • Cancer Father         bladder   • Schizophrenia Father    • Cancer Maternal Grandmother         breast   • Heart Disease Maternal Grandmother    • Psychiatric Illness Other    • Stroke Maternal Aunt    • Other Neg Hx         no connective tissue disorders or known aortic disease       He  has a past medical history of ADD (attention deficit disorder), Alcohol abuse, Allergic rhinitis (5/21/2009), Anemia (09/2019), Anxiety (5/21/2009), Arrhythmia, Back pain (5/21/2009), Bipolar disorder ([facility]), Bleeding ulcer (5/21/2009), Bleeding ulcer (5/21/2009), Depression (5/21/2009), Eczema (5/21/2009), GERD (gastroesophageal reflux disease), Heart murmur, Hemorrhagic disorder ([facility]), History of bleeding ulcers (2/12/2015), Insomnia (5/21/2009), Migraine (5/21/2009), Obesity, morbid ([facility]) (5/21/2009), Osteomyelitis ([facility]) (10/7/2019), Pain (09/2019), Pubic ramus fracture ([facility]) (9/28/2019), Rheumatoid arteritis ([facility]) (09/2019), Sleep apnea (5/21/2009), and Snoring. He also has no past medical history of Hyperlipidemia.  He  has a past surgical history that includes gastric bypass laparoscopic (2000); cholecystectomy (2000); irrigation & debridement ortho (Left, 10/9/2019); tendon transfer (Right, 1/22/2020); other surgical procedure; other surgical procedure (2019); and pr total knee arthroplasty (Right, 7/22/2020).       Objective:   Ht 1.803 m (5' 11\")   BMI 32.78 kg/m²     Physical Exam   AAO x3  NAD  Regular rhythm " per pt report  No labored breathing  No rashes evident    Echo reviewed, moderate AS    Assessment and Plan:   The following treatment plan was discussed:     1. Aortic stenosis, moderate  - EC-ECHOCARDIOGRAM COMPLETE W/O CONT; Future    Other orders  - losartan (COZAAR) 25 MG Tab; Take 1 Tab by mouth every day.  Dispense: 90 Tab; Refill: 3    1. Moderate aortic stenosis, bicuspid  2. PAF, sole episode without recurrence  3. HTN    - Refill Losartan  - Check echo annually  - Annual followup      Follow-up: No follow-ups on file.

## 2020-11-02 ENCOUNTER — PATIENT MESSAGE (OUTPATIENT)
Dept: BEHAVIORAL HEALTH | Facility: MEDICAL CENTER | Age: 45
End: 2020-11-02

## 2020-11-02 DIAGNOSIS — M25.569 CHRONIC KNEE PAIN, UNSPECIFIED LATERALITY: ICD-10-CM

## 2020-11-02 DIAGNOSIS — G89.29 CHRONIC PAIN OF RIGHT KNEE: ICD-10-CM

## 2020-11-02 DIAGNOSIS — M17.10 ARTHRITIS OF KNEE: ICD-10-CM

## 2020-11-02 DIAGNOSIS — R53.81 DEBILITY: ICD-10-CM

## 2020-11-02 DIAGNOSIS — Z96.651 S/P TOTAL KNEE ARTHROPLASTY, RIGHT: ICD-10-CM

## 2020-11-02 DIAGNOSIS — M25.561 CHRONIC PAIN OF RIGHT KNEE: ICD-10-CM

## 2020-11-02 DIAGNOSIS — M79.2 NEUROPATHIC PAIN: ICD-10-CM

## 2020-11-02 DIAGNOSIS — G89.29 CHRONIC KNEE PAIN, UNSPECIFIED LATERALITY: ICD-10-CM

## 2020-11-03 RX ORDER — GABAPENTIN 300 MG/1
300 CAPSULE ORAL 3 TIMES DAILY
Qty: 90 CAP | Refills: 1 | Status: SHIPPED | OUTPATIENT
Start: 2020-11-03 | End: 2020-12-29 | Stop reason: SDUPTHER

## 2020-11-03 RX ORDER — MORPHINE SULFATE 15 MG/1
15 TABLET ORAL EVERY 4 HOURS PRN
Qty: 120 TAB | Refills: 0 | Status: SHIPPED | OUTPATIENT
Start: 2020-11-03 | End: 2020-11-23 | Stop reason: SDUPTHER

## 2020-11-03 NOTE — TELEPHONE ENCOUNTER
From: Richard Hubbard II  To: Jae Whitney M.D.  Sent: 11/2/2020 5:13 PM PST  Subject: Prescription Question    Dr. Whitney  Sorry to bother you but I was just following up to see if you received my email from last week and or call to your MA? Thanks so much, hope you have a good evening!    Jeovanny

## 2020-11-10 DIAGNOSIS — G47.01 INSOMNIA DUE TO MEDICAL CONDITION: ICD-10-CM

## 2020-11-10 RX ORDER — ZOLPIDEM TARTRATE 5 MG/1
5 TABLET ORAL NIGHTLY PRN
Qty: 30 TAB | Refills: 0 | Status: SHIPPED | OUTPATIENT
Start: 2020-11-10 | End: 2020-12-08 | Stop reason: SDUPTHER

## 2020-11-19 ENCOUNTER — APPOINTMENT (OUTPATIENT)
Dept: BEHAVIORAL HEALTH | Facility: MEDICAL CENTER | Age: 45
End: 2020-11-19
Attending: PSYCHIATRY & NEUROLOGY
Payer: COMMERCIAL

## 2020-11-19 ENCOUNTER — HOSPITAL ENCOUNTER (OUTPATIENT)
Dept: RADIOLOGY | Facility: MEDICAL CENTER | Age: 45
End: 2020-11-19
Attending: NURSE PRACTITIONER
Payer: COMMERCIAL

## 2020-11-19 ENCOUNTER — HOSPITAL ENCOUNTER (OUTPATIENT)
Dept: RADIOLOGY | Facility: MEDICAL CENTER | Age: 45
End: 2020-11-19
Attending: ORTHOPAEDIC SURGERY
Payer: COMMERCIAL

## 2020-11-19 DIAGNOSIS — R10.30 LOWER ABDOMINAL PAIN: ICD-10-CM

## 2020-11-19 DIAGNOSIS — Q66.51 CONGENITAL PES PLANUS OF RIGHT FOOT: ICD-10-CM

## 2020-11-19 DIAGNOSIS — M25.571 PAIN IN RIGHT ANKLE AND JOINTS OF RIGHT FOOT: ICD-10-CM

## 2020-11-19 PROCEDURE — 73700 CT LOWER EXTREMITY W/O DYE: CPT | Mod: RT

## 2020-11-19 PROCEDURE — 76705 ECHO EXAM OF ABDOMEN: CPT

## 2020-11-20 ENCOUNTER — PATIENT MESSAGE (OUTPATIENT)
Dept: MEDICAL GROUP | Facility: MEDICAL CENTER | Age: 45
End: 2020-11-20

## 2020-11-20 DIAGNOSIS — R53.81 DEBILITY: ICD-10-CM

## 2020-11-20 DIAGNOSIS — G89.29 CHRONIC KNEE PAIN, UNSPECIFIED LATERALITY: ICD-10-CM

## 2020-11-20 DIAGNOSIS — M79.2 NEUROPATHIC PAIN: ICD-10-CM

## 2020-11-20 DIAGNOSIS — Z96.651 S/P TOTAL KNEE ARTHROPLASTY, RIGHT: ICD-10-CM

## 2020-11-20 DIAGNOSIS — M17.10 ARTHRITIS OF KNEE: ICD-10-CM

## 2020-11-20 DIAGNOSIS — K43.2 INCISIONAL HERNIA, WITHOUT OBSTRUCTION OR GANGRENE: ICD-10-CM

## 2020-11-20 DIAGNOSIS — G89.29 CHRONIC PAIN OF RIGHT KNEE: ICD-10-CM

## 2020-11-20 DIAGNOSIS — M25.569 CHRONIC KNEE PAIN, UNSPECIFIED LATERALITY: ICD-10-CM

## 2020-11-20 DIAGNOSIS — M25.561 CHRONIC PAIN OF RIGHT KNEE: ICD-10-CM

## 2020-11-23 RX ORDER — MORPHINE SULFATE 15 MG/1
15 TABLET ORAL EVERY 4 HOURS PRN
Qty: 120 TAB | Refills: 0 | Status: SHIPPED | OUTPATIENT
Start: 2020-11-23 | End: 2020-12-23

## 2020-12-08 ENCOUNTER — TELEMEDICINE (OUTPATIENT)
Dept: MEDICAL GROUP | Facility: MEDICAL CENTER | Age: 45
End: 2020-12-08
Payer: COMMERCIAL

## 2020-12-08 DIAGNOSIS — K43.2 INCISIONAL HERNIA, WITHOUT OBSTRUCTION OR GANGRENE: ICD-10-CM

## 2020-12-08 DIAGNOSIS — G47.01 INSOMNIA DUE TO MEDICAL CONDITION: ICD-10-CM

## 2020-12-08 DIAGNOSIS — Z91.81 HISTORY OF FALL: ICD-10-CM

## 2020-12-08 DIAGNOSIS — M81.6 LOCALIZED OSTEOPOROSIS WITHOUT CURRENT PATHOLOGICAL FRACTURE: ICD-10-CM

## 2020-12-08 DIAGNOSIS — S32.000D COMPRESSION FRACTURE OF LUMBAR VERTEBRA WITH ROUTINE HEALING, UNSPECIFIED LUMBAR VERTEBRAL LEVEL, SUBSEQUENT ENCOUNTER: ICD-10-CM

## 2020-12-08 PROCEDURE — 99214 OFFICE O/P EST MOD 30 MIN: CPT | Mod: 95,CR | Performed by: NURSE PRACTITIONER

## 2020-12-08 RX ORDER — ZOLPIDEM TARTRATE 5 MG/1
5 TABLET ORAL NIGHTLY PRN
Qty: 30 TAB | Refills: 4 | Status: SHIPPED | OUTPATIENT
Start: 2020-12-09 | End: 2021-01-07 | Stop reason: SDUPTHER

## 2020-12-08 ASSESSMENT — FIBROSIS 4 INDEX: FIB4 SCORE: 0.59

## 2020-12-08 NOTE — LETTER
[facility]  Brit Fitch A.P.R.N.  [address]  [place] [address]  Fax: [phone]   Authorization for Release/Disclosure of   Protected Health Information   Name: LESLIE BADILLO II : 1975 SSN: [id number]   Address: [address] [place] [address]  [place] [address] Phone:    [phone] (home)    I authorize the entity listed below to release/disclose the PHI below to:   [facility]/Brit Fitch, A.P.R.N. and Brit Fitch A.P.R.N.   Provider or Entity Name:  [facility]   Address   City, State, [facility]   Phone:      Fax:     Reason for request: continuity of care   Information to be released:    [  ] LAST COLONOSCOPY,  including any PATH REPORT and follow-up  [  ] LAST FIT/COLOGUARD RESULT [  ] LAST DEXA  [  ] LAST MAMMOGRAM  [  ] LAST PAP  [  ] LAST LABS [  ] RETINA EXAM REPORT  [  ] IMMUNIZATION RECORDS  [  x] Release all info      [  ] Check here and initial the line next to each item to release ALL health information INCLUDING  _____ Care and treatment for drug and / or alcohol abuse  _____ HIV testing, infection status, or AIDS  _____ Genetic Testing    DATES OF SERVICE OR TIME PERIOD TO BE DISCLOSED: _____________  I understand and acknowledge that:  * This Authorization may be revoked at any time by you in writing, except if your health information has already been used or disclosed.  * Your health information that will be used or disclosed as a result of you signing this authorization could be re-disclosed by the recipient. If this occurs, your re-disclosed health information may no longer be protected by State or Federal laws.  * You may refuse to sign this Authorization. Your refusal will not affect your ability to obtain treatment.  * This Authorization becomes effective upon signing and will  on (date) __________.      If no date is indicated, this Authorization will  one (1) year from the signature date.    Name: Leslie Badillo  II                                     Continuity of Care   Date:     12/8/2020       PLEASE FAX REQUESTED RECORDS BACK TO: [phone]

## 2020-12-08 NOTE — PROGRESS NOTES
"Telemedicine Video Visit: Established Patient   This Remote Face to Face encounter was conducted via Zoom. Given the importance of social distancing and other strategies recommended to reduce the risk of COVID-19 transmission, I am providing medical care to this patient via audio/video visit in place of an in person visit at the request of the patient. Verbal consent to telehealth, risks, benefits, and consequences were discussed. Patient retains the right to withdraw at any time. All existing confidentiality protections apply. The patient has access to all transmitted medical information. No dissemination of any patient images or information to other entities without further written consent.  Subjective:     Chief Complaint   Patient presents with   • Fall     Back and hip was injured, DOI: 11/27/2020       Richard Hubbard II is a 45 y.o. male presenting for evaluation and management of:    Patient presents stating that he had a fall several weeks ago as his ankle had given out on him.  He ended up having follow-up with  [facility] in [place].  States that he did have imaging completed on his ankle as well as his back.  Was informed that he does have compression fractures in his back.  This is new for him.  Followed up with Dr. Buchanan -Has surgery scheduled on Feb 3rd, 2021 for his ankle.    He reports he is now wearing a back brace now. Was informed he had \"4 fractures in the spine from the fall.\" He saw Dr. Mccallum with [facility].    Insomnia  On Ambien for this. Helps him fall back asleep easily as he gets up several times per night d/t pain need to urinate. Trazodone-sped up his heart rate. Having a hard time coming off Ambien. Will be following u with sleep studies in January. Will be addressing this topic with them as well. LMP 11/11/2020.     Osteoporosis  Last Bone Density 10/5/2020. He is osteoporotic on his left femur.  Osteopenia in lumbar spine.  He does have a history of having a Lucila-en-Y back in 2000.  " "This would make him not a candidate to take the oral bisphosphonates.  Recommend treatment for this.  He states that he has been discussing this as well with his endocrinologist.  He will follow-up with his endocrinologist for treatment.    Incisional hernia  Ready to schedule with Dr. Ganser. Have informed him of this.         ROS back pain, ankle pain, leg swelling, anxiety  Denies any recent fevers or chills. No nausea or vomiting. No chest pains or shortness of breath.     Allergies   Allergen Reactions   • Benadryl [Altaryl]      \"I get agitated\"   • Nsaids      Bleeding, \"I had a bleeding ulcer\"   • Phenergan [Promethazine Hcl]      \"I get very agitated\"   • Penicillins      \"Had some dizziness\"  Tolerated Unasyn 10/2019       Current medicines (including changes today)  Current Outpatient Medications   Medication Sig Dispense Refill   • zolpidem (AMBIEN) 5 MG Tab Take 1 Tab by mouth at bedtime as needed for up to 30 days. 30 Tab 4   • morphine (MS IR) 15 MG tablet Take 1 Tab by mouth every four hours as needed for Severe Pain (up to 4 times per day) for up to 30 days. 120 Tab 0   • gabapentin (NEURONTIN) 300 MG Cap Take 1 Cap by mouth 3 times a day. 90 Cap 1   • losartan (COZAAR) 25 MG Tab Take 1 Tab by mouth every day. 90 Tab 3   • DULoxetine (CYMBALTA) 30 MG Cap DR Particles Take 1 Cap by mouth every day. 30 Cap 2   • omeprazole (PRILOSEC) 40 MG delayed-release capsule Take 1 Cap by mouth every day. 30 Cap 6   • betamethasone dipropionate 0.05 % Cream Apply 1 g to affected area(s) 2 times a day. 45 g 0   • ascorbic acid (VITAMIN C) 500 MG tablet Take 1 Tab by mouth 2 times a day, with meals. 60 Tab 0   • aspirin 81 MG EC tablet Take 1 Tab by mouth every day. 90 Tab 2   • ferrous sulfate 325 (65 Fe) MG tablet Take 1 Tab by mouth 2 times a day, with meals. 60 Tab 0   • vitamin D 2000 UNIT Tab Take 1 Tab by mouth every day. 60 Tab    • docusate sodium 100 MG Cap Take 100 mg by mouth 2 Times a Day. 60 Cap    • " acetaminophen (TYLENOL) 500 MG Tab Take 500-1,000 mg by mouth every 6 hours as needed.     • Naloxone (NARCAN) 4 MG/0.1ML Liquid One spray in one nostril for overdose and call 911. (Patient taking differently: Spray 1 Spray in nose as needed. One spray in one nostril for overdose and call 911.) 1 Package 0   • KEVZARA 200 MG/1.14ML Solution Auto-injector      • tobramycin-dexamethasone (TOBRADEX) 0.3-0.1 % Suspension      • nicotine (NICODERM) 7 MG/24HR PATCH 24 HR Apply 1 Patch to skin as directed every 24 hours. (Patient not taking: Reported on 10/14/2020) 30 Patch 3     No current facility-administered medications for this visit.        Patient Active Problem List    Diagnosis Date Noted   • Paroxysmal atrial fibrillation (HCC) 07/23/2020     Priority: Low   • Essential hypertension 09/13/2019     Priority: Low   • Rheumatoid arthritis (HCC) 09/08/2015     Priority: Low   • Chronic pain of right knee 09/08/2015     Priority: Low   • At high risk for falls 12/10/2020   • Localized osteoporosis without current pathological fracture 10/19/2020   • History of osteomyelitis 08/14/2020   • History of immunosuppressive therapy 08/14/2020   • S/P total knee arthroplasty, right 07/27/2020   • Cigarette nicotine dependence without complication 07/21/2020   • Aortic root dilatation (HCC) 09/13/2019   • Aortic stenosis, moderate 09/12/2019   • MONICA (obstructive sleep apnea) 03/09/2010   • Mood disorder ([facility]) 05/21/2009   • Anxiety disorder 05/21/2009       Family History   Problem Relation Age of Onset   • Hypertension Mother    • Arthritis Mother    • Depression Mother    • Cancer Father         bladder   • Schizophrenia Father    • Cancer Maternal Grandmother         breast   • Heart Disease Maternal Grandmother    • Psychiatric Illness Other    • Stroke Maternal Aunt    • Other Neg Hx         no connective tissue disorders or known aortic disease       He  has a past medical history of ADD (attention deficit disorder),  "Alcohol abuse, Allergic rhinitis (5/21/2009), Anemia (09/2019), Anxiety (5/21/2009), Arrhythmia, Back pain (5/21/2009), Bipolar disorder ([facility]), Bleeding ulcer (5/21/2009), Bleeding ulcer (5/21/2009), Depression (5/21/2009), Eczema (5/21/2009), GERD (gastroesophageal reflux disease), Heart murmur, Hemorrhagic disorder ([facility]), History of bleeding ulcers (2/12/2015), Insomnia (5/21/2009), Migraine (5/21/2009), Obesity, morbid (HCC) (5/21/2009), Osteomyelitis ([facility]) (10/7/2019), Pain (09/2019), Pubic ramus fracture ([facility]) (9/28/2019), Rheumatoid arteritis ([facility]) (09/2019), Sleep apnea (5/21/2009), and Snoring. He also has no past medical history of Hyperlipidemia.  He  has a past surgical history that includes gastric bypass laparoscopic (2000); cholecystectomy (2000); irrigation & debridement ortho (Left, 10/9/2019); tendon transfer (Right, 1/22/2020); other surgical procedure; other surgical procedure (2019); and pr total knee arthroplasty (Right, 7/22/2020).       Objective:   Vitals obtained by patient:  Resp 14   Ht 1.803 m (5' 11\")   Wt 106.6 kg (235 lb)   BMI 32.78 kg/m²     Physical Exam:  Constitutional: Alert, no distress, well-groomed.  Skin: No rashes in visible areas.  Eye: Round. Conjunctiva clear, lids normal. No icterus.   ENMT: Lips pink without lesions, good dentition, moist mucous membranes. Phonation normal.  Neck: No masses, no thyromegaly. Moves freely without pain.  CV: Pulse as reported by patient  Respiratory: Unlabored respiratory effort, no cough or audible wheeze  Psych: Alert and oriented x3, normal affect and mood.       Assessment and Plan:   The following treatment plan was discussed:     1. History of fall  Patient is at high fall risk based on his multiple comorbidities.  Have cautioned regarding his high fall risk with injuries.    2. Compression fracture of lumbar vertebra with routine healing, unspecified lumbar vertebral level, subsequent encounter  Following up with orthopedics for " this.  Status post recent fall.  Currently wearing a back brace.  Requesting records.  - REFERRAL TO ORTHOPEDICS    3. Insomnia due to medical condition  Chronic problem.  Ambien is effective.  Have expressed concerns for increase of risk for falls.  Have discussed with patient that long-term use of Ambien along with narcotics is not recommended.  We will continue to monitor this monthly.  Caution regarding oversedation.  Patient states that he is very cautious regarding this.   checked.  Rx sent.  Have discussed trial of medication such as trazodone.     - zolpidem (AMBIEN) 5 MG Tab; Take 1 Tab by mouth at bedtime as needed for up to 30 days.  Dispense: 30 Tab; Refill: 4    4. Incisional hernia, without obstruction or gangrene  Found on imaging.  Informed patient that he is ready to schedule surgery.  Flow number given to patient.      5. Localized osteoporosis without current pathological fracture  Not a candidate for oral bisphosphonates due to history of Lucila-en-Y back in 2000.  Have reviewed possible options for the patient.  He has chosen to follow-up with endocrinology for treatment.    Concern for high fall risk with injury.    Follow-up: No follow-ups on file.    Face to Face Video Visit:     MARTÍN Turner

## 2020-12-10 ENCOUNTER — HOSPITAL ENCOUNTER (OUTPATIENT)
Dept: BEHAVIORAL HEALTH | Facility: MEDICAL CENTER | Age: 45
End: 2020-12-10
Attending: FAMILY MEDICINE
Payer: COMMERCIAL

## 2020-12-10 VITALS
BODY MASS INDEX: 32.9 KG/M2 | WEIGHT: 235 LBS | SYSTOLIC BLOOD PRESSURE: 140 MMHG | HEIGHT: 71 IN | DIASTOLIC BLOOD PRESSURE: 93 MMHG | HEART RATE: 80 BPM | RESPIRATION RATE: 18 BRPM | TEMPERATURE: 98.4 F

## 2020-12-10 DIAGNOSIS — M79.2 NEUROPATHIC PAIN: ICD-10-CM

## 2020-12-10 DIAGNOSIS — G89.29 CHRONIC PAIN OF RIGHT KNEE: ICD-10-CM

## 2020-12-10 DIAGNOSIS — M25.569 CHRONIC KNEE PAIN, UNSPECIFIED LATERALITY: ICD-10-CM

## 2020-12-10 DIAGNOSIS — M06.9 RHEUMATOID ARTHRITIS INVOLVING MULTIPLE SITES, UNSPECIFIED WHETHER RHEUMATOID FACTOR PRESENT (HCC): ICD-10-CM

## 2020-12-10 DIAGNOSIS — M25.561 CHRONIC PAIN OF RIGHT KNEE: ICD-10-CM

## 2020-12-10 DIAGNOSIS — G89.4 CHRONIC PAIN DISORDER: ICD-10-CM

## 2020-12-10 DIAGNOSIS — M81.6 LOCALIZED OSTEOPOROSIS WITHOUT CURRENT PATHOLOGICAL FRACTURE: ICD-10-CM

## 2020-12-10 DIAGNOSIS — Z96.651 S/P TOTAL KNEE ARTHROPLASTY, RIGHT: ICD-10-CM

## 2020-12-10 DIAGNOSIS — R53.81 DEBILITY: ICD-10-CM

## 2020-12-10 DIAGNOSIS — G89.29 CHRONIC KNEE PAIN, UNSPECIFIED LATERALITY: ICD-10-CM

## 2020-12-10 DIAGNOSIS — M17.10 ARTHRITIS OF KNEE: ICD-10-CM

## 2020-12-10 PROBLEM — R74.8 ELEVATED ALKALINE PHOSPHATASE LEVEL: Status: RESOLVED | Noted: 2019-09-13 | Resolved: 2020-12-10

## 2020-12-10 PROBLEM — R60.0 LOCALIZED EDEMA: Status: RESOLVED | Noted: 2019-08-15 | Resolved: 2020-12-10

## 2020-12-10 PROBLEM — Z91.81 AT HIGH RISK FOR FALLS: Status: ACTIVE | Noted: 2020-12-10

## 2020-12-10 PROBLEM — S32.599A PUBIC RAMUS FRACTURE (HCC): Status: RESOLVED | Noted: 2019-09-28 | Resolved: 2020-12-10

## 2020-12-10 PROBLEM — I35.1 AORTIC EJECTION MURMUR: Status: RESOLVED | Noted: 2019-03-08 | Resolved: 2020-12-10

## 2020-12-10 PROBLEM — F50.89 PAGOPHAGIA: Status: RESOLVED | Noted: 2017-11-13 | Resolved: 2020-12-10

## 2020-12-10 PROBLEM — D50.9 IRON DEFICIENCY ANEMIA: Chronic | Status: RESOLVED | Noted: 2019-08-15 | Resolved: 2020-12-10

## 2020-12-10 PROBLEM — R19.5 POSITIVE OCCULT STOOL BLOOD TEST: Status: RESOLVED | Noted: 2019-09-17 | Resolved: 2020-12-10

## 2020-12-10 PROBLEM — I48.91 ATRIAL FIBRILLATION WITH RAPID VENTRICULAR RESPONSE (HCC): Status: RESOLVED | Noted: 2019-11-05 | Resolved: 2020-12-10

## 2020-12-10 PROBLEM — D62 ACUTE BLOOD LOSS ANEMIA: Status: RESOLVED | Noted: 2020-07-23 | Resolved: 2020-12-10

## 2020-12-10 PROCEDURE — 99214 OFFICE O/P EST MOD 30 MIN: CPT | Performed by: FAMILY MEDICINE

## 2020-12-10 RX ORDER — SARILUMAB 200 MG/1.14ML
INJECTION, SOLUTION SUBCUTANEOUS
COMMUNITY
Start: 2020-11-20 | End: 2021-10-05

## 2020-12-10 RX ORDER — TOBRAMYCIN AND DEXAMETHASONE 3; 1 MG/ML; MG/ML
SUSPENSION/ DROPS OPHTHALMIC
COMMUNITY
Start: 2020-10-16 | End: 2021-10-05

## 2020-12-10 ASSESSMENT — ENCOUNTER SYMPTOMS
CONSTITUTIONAL NEGATIVE: 1
TINGLING: 0
PALPITATIONS: 0
PSYCHIATRIC NEGATIVE: 1
BRUISES/BLEEDS EASILY: 0
NAUSEA: 0
FALLS: 1
CARDIOVASCULAR NEGATIVE: 1
HEADACHES: 0
CHILLS: 0
MYALGIAS: 0
DIZZINESS: 0
NEUROLOGICAL NEGATIVE: 1
HEMOPTYSIS: 0
HEARTBURN: 0
EYES NEGATIVE: 1
FEVER: 0
BLURRED VISION: 0
BACK PAIN: 1
GASTROINTESTINAL NEGATIVE: 1
DEPRESSION: 0
RESPIRATORY NEGATIVE: 1
DOUBLE VISION: 0
COUGH: 0

## 2020-12-10 ASSESSMENT — FIBROSIS 4 INDEX: FIB4 SCORE: 0.59

## 2020-12-10 NOTE — PROGRESS NOTES
Subjective:      Richard Hubbard II is a 45 y.o. male who presents with Behavioral Health Care Management            1. Chronic pain disorder  On oxycodone 15 mg qid  Had a recent fall with unfortunately new vertebral fractures, seen by ROWAN. Will continue with brace and meds from endo for osteoporosis  Due to the fact he is having his right ankle operated on 2/3/21 will continue with same dosage until march and then see if we can taper him down    2. Rheumatoid arthritis involving multiple sites, unspecified whether rheumatoid factor present ([facility])  Per endo    3. Localized osteoporosis without current pathological fracture  Continue with meds and brace    Past Medical History:  No date: ADD (attention deficit disorder)  No date: Alcohol abuse  5/21/2009: Allergic rhinitis  09/2019: Anemia  5/21/2009: Anxiety  No date: Arrhythmia      Comment:  afib when hospitaized for infection  5/21/2009: Back pain  No date: Bipolar disorder ([facility])  5/21/2009: Bleeding ulcer  5/21/2009: Bleeding ulcer      Comment:  History of anemia-now on nexium   5/21/2009: Depression  5/21/2009: Eczema  No date: GERD (gastroesophageal reflux disease)  No date: Heart murmur      Comment:  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  No date: Hemorrhagic disorder ([facility])      Comment:  hx of bleeding ulcers  2/12/2015: History of bleeding ulcers  5/21/2009: Insomnia  5/21/2009: Migraine  5/21/2009: Obesity, morbid ([facility])  10/7/2019: Osteomyelitis ([facility])  09/2019: Pain      Comment:  Chronic knee and back pain  9/28/2019: Pubic ramus fracture ([facility])  09/2019: Rheumatoid arteritis ([facility])      Comment:  knee, feet  5/21/2009: Sleep apnea      Comment:  Did not tolerate cpap, does not use it  No date: Snoring  Past Surgical History:  7/22/2020: PB TOTAL KNEE ARTHROPLASTY; Right      Comment:  Procedure: ARTHROPLASTY, KNEE, TOTAL;  Surgeon: Temo Pires M.D.;  Location: SURGERY [facility];                 Service:  Orthopedics  2020: TENDON TRANSFER; Right      Comment:  Procedure: RIGHT DISTAL ULNA RESECTION AND EXTENSOR                TENDON TRANSFER;  Surgeon: Marine Rushing M.D.;                 Location: SURGERY [facility];  Service: Orthopedics  10/9/2019: IRRIGATION & DEBRIDEMENT ORTHO; Left      Comment:  Procedure: IRRIGATION AND DEBRIDEMENT, WOUND - PELVIC                OSTEOMYELITIS;  Surgeon: You Olivares M.D.;                 Location: SURGERY [facility];  Service:                Orthopedics  2019: OTHER SURGICAL PROCEDURE      Comment:  osteomelitis of pelvis cleaned  2000: GASTRIC BYPASS LAPAROSCOPIC      Comment:  Lucila en y  2000: CHOLECYSTECTOMY  No date: OTHER SURGICAL PROCEDURE      Comment:  repair of broken penis  Social History    Tobacco Use      Smoking status: Current Every Day Smoker        Packs/day: 0.25        Years: 3.00        Pack years: .75        Types: Cigarettes, Cigars        Quit date: 2019        Years since quittin.2      Smokeless tobacco: Never Used      Tobacco comment: occasional cigar    Alcohol use: No      Alcohol/week: 0.0 oz      Comment: quit 2.5 yrs ago- past ETOH abuse    Drug use: No    Review of patient's family history indicates:  Problem: Hypertension      Relation: Mother          Age of Onset: (Not Specified)  Problem: Arthritis      Relation: Mother          Age of Onset: (Not Specified)  Problem: Depression      Relation: Mother          Age of Onset: (Not Specified)  Problem: Cancer      Relation: Father          Age of Onset: (Not Specified)          Comment: bladder  Problem: Schizophrenia      Relation: Father          Age of Onset: (Not Specified)  Problem: Cancer      Relation: Maternal Grandmother          Age of Onset: (Not Specified)          Comment: breast  Problem: Heart Disease      Relation: Maternal Grandmother          Age of Onset: (Not Specified)  Problem: Psychiatric Illness      Relation: Other          Age of  Onset: (Not Specified)  Problem: Stroke      Relation: Maternal Aunt          Age of Onset: (Not Specified)  Problem: Other      Relation: Neg Hx          Age of Onset: (Not Specified)          Comment: no connective tissue disorders or known aortic disease      Current Outpatient Medications: •  zolpidem (AMBIEN) 5 MG Tab, Take 1 Tab by mouth at bedtime as needed for up to 30 days., Disp: 30 Tab, Rfl: 4•  gabapentin (NEURONTIN) 300 MG Cap, Take 1 Cap by mouth 3 times a day., Disp: 90 Cap, Rfl: 1•  KEVZARA 200 MG/1.14ML Solution Auto-injector, , Disp: , Rfl: •  tobramycin-dexamethasone (TOBRADEX) 0.3-0.1 % Suspension, , Disp: , Rfl: •  morphine (MS IR) 15 MG tablet, Take 1 Tab by mouth every four hours as needed for Severe Pain (up to 4 times per day) for up to 30 days., Disp: 120 Tab, Rfl: 0•  losartan (COZAAR) 25 MG Tab, Take 1 Tab by mouth every day., Disp: 90 Tab, Rfl: 3•  DULoxetine (CYMBALTA) 30 MG Cap DR Particles, Take 1 Cap by mouth every day., Disp: 30 Cap, Rfl: 2•  omeprazole (PRILOSEC) 40 MG delayed-release capsule, Take 1 Cap by mouth every day., Disp: 30 Cap, Rfl: 6•  betamethasone dipropionate 0.05 % Cream, Apply 1 g to affected area(s) 2 times a day., Disp: 45 g, Rfl: 0•  ascorbic acid (VITAMIN C) 500 MG tablet, Take 1 Tab by mouth 2 times a day, with meals., Disp: 60 Tab, Rfl: 0•  aspirin 81 MG EC tablet, Take 1 Tab by mouth every day., Disp: 90 Tab, Rfl: 2 •  ferrous sulfate 325 (65 Fe) MG tablet, Take 1 Tab by mouth 2 times a day, with meals., Disp: 60 Tab, Rfl: 0•  vitamin D 2000 UNIT Tab, Take 1 Tab by mouth every day., Disp: 60 Tab, Rfl: •  docusate sodium 100 MG Cap, Take 100 mg by mouth 2 Times a Day., Disp: 60 Cap, Rfl: •  nicotine (NICODERM) 7 MG/24HR PATCH 24 HR, Apply 1 Patch to skin as directed every 24 hours. (Patient not taking: Reported on 10/14/2020), Disp: 30 Patch, Rfl: 3•  acetaminophen (TYLENOL) 500 MG Tab, Take 500-1,000 mg by mouth every 6 hours as needed., Disp: , Rfl: •   "Naloxone (NARCAN) 4 MG/0.1ML Liquid, One spray in one nostril for overdose and call 911. (Patient taking differently: Spray 1 Spray in nose as needed. One spray in one nostril for overdose and call 911.), Disp: 1 Package, Rfl: 0    Patient was instructed on the use of medications, either prescriptions or OTC and informed on when the appropriate follow up time period should be. In addition, patient was also instructed that should any acute worsening occur that they should notify this clinic asap or call 911.          Review of Systems   Constitutional: Negative.  Negative for chills and fever.   HENT: Negative.  Negative for hearing loss.    Eyes: Negative.  Negative for blurred vision and double vision.   Respiratory: Negative.  Negative for cough and hemoptysis.    Cardiovascular: Negative.  Negative for chest pain and palpitations.   Gastrointestinal: Negative.  Negative for heartburn and nausea.   Genitourinary: Negative.  Negative for dysuria.   Musculoskeletal: Positive for back pain, falls and joint pain. Negative for myalgias.   Skin: Negative.  Negative for rash.   Neurological: Negative.  Negative for dizziness, tingling and headaches.   Endo/Heme/Allergies: Negative.  Does not bruise/bleed easily.   Psychiatric/Behavioral: Negative.  Negative for depression and suicidal ideas.   All other systems reviewed and are negative.         Objective:     /93   Pulse 80   Temp 36.9 °C (98.4 °F)   Resp 18   Ht 1.803 m (5' 11\")   Wt 106.6 kg (235 lb)   BMI 32.78 kg/m²      Physical Exam  Vitals signs and nursing note reviewed.   Constitutional:       General: He is not in acute distress.     Appearance: He is well-developed. He is not diaphoretic.      Comments: Wearing tlso brace, walks slowly with cane and obvious valgus deformity of right leg in knee and ankle   HENT:      Head: Normocephalic and atraumatic.      Mouth/Throat:      Pharynx: No oropharyngeal exudate.   Eyes:      Pupils: Pupils are " equal, round, and reactive to light.   Cardiovascular:      Rate and Rhythm: Normal rate and regular rhythm.      Heart sounds: Normal heart sounds. No murmur. No friction rub. No gallop.    Pulmonary:      Effort: Pulmonary effort is normal. No respiratory distress.      Breath sounds: Normal breath sounds. No wheezing or rales.   Chest:      Chest wall: No tenderness.   Neurological:      Mental Status: He is alert and oriented to person, place, and time.   Psychiatric:         Behavior: Behavior normal.         Thought Content: Thought content normal.         Judgment: Judgment normal.                 Assessment/Plan:   1. Chronic pain disorder  On oxycodone 15 mg qid  Had a recent fall with unfortunately new vertebral fractures, seen by ROWAN. Will continue with brace and meds from endo for osteoporosis  Due to the fact he is having his right ankle operated on 2/3/21 will continue with same dosage until march and then see if we can taper him down    2. Rheumatoid arthritis involving multiple sites, unspecified whether rheumatoid factor present (HCC)  Per endo    3. Localized osteoporosis without current pathological fracture  Continue with meds and brace

## 2020-12-14 VITALS — RESPIRATION RATE: 14 BRPM | HEIGHT: 71 IN | WEIGHT: 235 LBS | BODY MASS INDEX: 32.9 KG/M2

## 2020-12-22 DIAGNOSIS — M25.572 ACUTE LEFT ANKLE PAIN: ICD-10-CM

## 2020-12-28 ENCOUNTER — PATIENT MESSAGE (OUTPATIENT)
Dept: BEHAVIORAL HEALTH | Facility: MEDICAL CENTER | Age: 45
End: 2020-12-28

## 2020-12-28 DIAGNOSIS — G89.4 CHRONIC PAIN DISORDER: ICD-10-CM

## 2020-12-29 RX ORDER — MORPHINE SULFATE 15 MG/1
15 TABLET ORAL EVERY 6 HOURS PRN
Qty: 120 TAB | Refills: 0 | Status: SHIPPED | OUTPATIENT
Start: 2020-12-29 | End: 2021-01-26 | Stop reason: SDUPTHER

## 2020-12-29 RX ORDER — GABAPENTIN 300 MG/1
300 CAPSULE ORAL 3 TIMES DAILY
Qty: 90 CAP | Refills: 1 | Status: SHIPPED | OUTPATIENT
Start: 2020-12-29 | End: 2021-01-26 | Stop reason: SDUPTHER

## 2020-12-29 NOTE — PATIENT COMMUNICATION
Patient is requesting Morphine but the medication has been discontinued. Please advised     Received request via: Patient    Was the patient seen in the last year in this department? Yes    Does the patient have an active prescription (recently filled or refills available) for medication(s) requested? No

## 2020-12-29 NOTE — TELEPHONE ENCOUNTER
From: Richard Hubbard II  To: Jae Whitney M.D.  Sent: 12/28/2020 3:35 PM PST  Subject: Prescription Question    I was wanting to request refill on my medications. The Gabepentin and Morphine. Thank you for your help!    Jeovanny Hubbard

## 2021-01-01 ENCOUNTER — HOSPITAL ENCOUNTER (OUTPATIENT)
Dept: RADIOLOGY | Facility: MEDICAL CENTER | Age: 46
End: 2021-01-01
Attending: PHYSICIAN ASSISTANT
Payer: COMMERCIAL

## 2021-01-01 ENCOUNTER — OFFICE VISIT (OUTPATIENT)
Dept: URGENT CARE | Facility: PHYSICIAN GROUP | Age: 46
End: 2021-01-01
Payer: COMMERCIAL

## 2021-01-01 ENCOUNTER — HOSPITAL ENCOUNTER (OUTPATIENT)
Dept: RADIOLOGY | Facility: MEDICAL CENTER | Age: 46
End: 2021-01-01
Attending: NURSE PRACTITIONER
Payer: COMMERCIAL

## 2021-01-01 VITALS
HEIGHT: 67 IN | TEMPERATURE: 98.9 F | HEART RATE: 89 BPM | RESPIRATION RATE: 16 BRPM | WEIGHT: 235 LBS | OXYGEN SATURATION: 100 % | SYSTOLIC BLOOD PRESSURE: 120 MMHG | BODY MASS INDEX: 36.88 KG/M2 | DIASTOLIC BLOOD PRESSURE: 80 MMHG
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
## 2021-01-01 DIAGNOSIS — M25.572 ACUTE LEFT ANKLE PAIN: ICD-10-CM

## 2021-01-01 DIAGNOSIS — M25.551 RIGHT HIP PAIN: ICD-10-CM

## 2021-01-01 DIAGNOSIS — S92.302A FRACTURE OF UNSPECIFIED METATARSAL BONE(S), LEFT FOOT, INITIAL ENCOUNTER FOR CLOSED FRACTURE: ICD-10-CM

## 2021-01-01 DIAGNOSIS — S99.922A INJURY OF LEFT FOOT, INITIAL ENCOUNTER: ICD-10-CM

## 2021-01-01 PROCEDURE — 99213 OFFICE O/P EST LOW 20 MIN: CPT | Performed by: PHYSICIAN ASSISTANT

## 2021-01-01 PROCEDURE — 73610 X-RAY EXAM OF ANKLE: CPT | Mod: LT

## 2021-01-01 PROCEDURE — 73502 X-RAY EXAM HIP UNI 2-3 VIEWS: CPT | Mod: RT

## 2021-01-01 PROCEDURE — 73630 X-RAY EXAM OF FOOT: CPT | Mod: LT

## 2021-01-01 ASSESSMENT — ENCOUNTER SYMPTOMS
NAUSEA: 0
SENSORY CHANGE: 0
FALLS: 1
VOMITING: 0
CHILLS: 0
SHORTNESS OF BREATH: 0
TINGLING: 0
PALPITATIONS: 0
SORE THROAT: 0
BLURRED VISION: 0
FEVER: 0

## 2021-01-01 ASSESSMENT — FIBROSIS 4 INDEX: FIB4 SCORE: 0.59

## 2021-01-01 ASSESSMENT — PAIN SCALES - GENERAL: PAINLEVEL: 5=MODERATE PAIN

## 2021-01-01 NOTE — PROGRESS NOTES
"Subjective:      Richard Hubbard II is a 45 y.o. male who presents with Fall (lower R buttocks pian, fell x2 days ago) and Foot Pain (L foot pain, fell a week ago)      HPI:  This is a new problem. Richard Hubbard II is a 45 y.o. male who presents today for evaluation after a fall.  Patient ports that approximate 1.5 weeks ago he fell forward towards a door.  He was wearing shoes and he felt his toes curl up and he felt a \"crack\" in his left forefoot.  He states that the pain has improved since that time and gets worse with ambulation.  Patient has not been elevating it or applying ice/heat.  He takes morphine as he is trying to wean off multiple narcotic medications from surgeries earlier this year.  He has not noticed any numbness or tingling.  Patient also reports that 2 days ago he sat down hard on the top of a slide while playing with his daughter immediately felt a pain in his right hip/buttocks.  Patient has history of pelvic fractures earlier this year and was concerned that the pain has not improved over the past 48 hours.  He also has not applied any ice to this area.  Patient has history of osteoporosis and gets fractures easily.      Review of Systems   Constitutional: Negative for chills and fever.   HENT: Negative for sore throat.    Eyes: Negative for blurred vision.   Respiratory: Negative for shortness of breath.    Cardiovascular: Negative for chest pain and palpitations.   Gastrointestinal: Negative for nausea and vomiting.   Musculoskeletal: Positive for falls and joint pain (left ankle/foot, right hip).   Neurological: Negative for tingling and sensory change.       PMH:  has a past medical history of ADD (attention deficit disorder), Alcohol abuse, Allergic rhinitis (5/21/2009), Anemia (09/2019), Anxiety (5/21/2009), Arrhythmia, Back pain (5/21/2009), Bipolar disorder (HCC), Bleeding ulcer (5/21/2009), Bleeding ulcer (5/21/2009), Depression (5/21/2009), Eczema (5/21/2009), GERD " (gastroesophageal reflux disease), Heart murmur, Hemorrhagic disorder ([facility]), History of bleeding ulcers (2/12/2015), Insomnia (5/21/2009), Migraine (5/21/2009), Obesity, morbid ([facility]) (5/21/2009), Osteomyelitis ([facility]) (10/7/2019), Pain (09/2019), Pubic ramus fracture ([facility]) (9/28/2019), Rheumatoid arteritis ([facility]) (09/2019), Sleep apnea (5/21/2009), and Snoring. He also has no past medical history of Hyperlipidemia.  MEDS:   Current Outpatient Medications:   •  gabapentin (NEURONTIN) 300 MG Cap, Take 1 Cap by mouth 3 times a day., Disp: 90 Cap, Rfl: 1  •  morphine (MS IR) 15 MG tablet, Take 1 Tab by mouth every 6 hours as needed for Severe Pain for up to 30 days., Disp: 120 Tab, Rfl: 0  •  KEVZARA 200 MG/1.14ML Solution Auto-injector, , Disp: , Rfl:   •  tobramycin-dexamethasone (TOBRADEX) 0.3-0.1 % Suspension, , Disp: , Rfl:   •  zolpidem (AMBIEN) 5 MG Tab, Take 1 Tab by mouth at bedtime as needed for up to 30 days., Disp: 30 Tab, Rfl: 4  •  losartan (COZAAR) 25 MG Tab, Take 1 Tab by mouth every day., Disp: 90 Tab, Rfl: 3  •  DULoxetine (CYMBALTA) 30 MG Cap DR Particles, Take 1 Cap by mouth every day., Disp: 30 Cap, Rfl: 2  •  omeprazole (PRILOSEC) 40 MG delayed-release capsule, Take 1 Cap by mouth every day., Disp: 30 Cap, Rfl: 6  •  betamethasone dipropionate 0.05 % Cream, Apply 1 g to affected area(s) 2 times a day., Disp: 45 g, Rfl: 0  •  ascorbic acid (VITAMIN C) 500 MG tablet, Take 1 Tab by mouth 2 times a day, with meals., Disp: 60 Tab, Rfl: 0  •  aspirin 81 MG EC tablet, Take 1 Tab by mouth every day., Disp: 90 Tab, Rfl: 2  •  ferrous sulfate 325 (65 Fe) MG tablet, Take 1 Tab by mouth 2 times a day, with meals., Disp: 60 Tab, Rfl: 0  •  vitamin D 2000 UNIT Tab, Take 1 Tab by mouth every day., Disp: 60 Tab, Rfl:   •  docusate sodium 100 MG Cap, Take 100 mg by mouth 2 Times a Day., Disp: 60 Cap, Rfl:   •  nicotine (NICODERM) 7 MG/24HR PATCH 24 HR, Apply 1 Patch to skin as directed every 24 hours. (Patient not  "taking: Reported on 10/14/2020), Disp: 30 Patch, Rfl: 3  •  acetaminophen (TYLENOL) 500 MG Tab, Take 500-1,000 mg by mouth every 6 hours as needed., Disp: , Rfl:   •  Naloxone (NARCAN) 4 MG/0.1ML Liquid, One spray in one nostril for overdose and call 911. (Patient taking differently: Spray 1 Spray in nose as needed. One spray in one nostril for overdose and call 911.), Disp: 1 Package, Rfl: 0  ALLERGIES:   Allergies   Allergen Reactions   • Benadryl [Altaryl]      \"I get agitated\"   • Nsaids      Bleeding, \"I had a bleeding ulcer\"   • Phenergan [Promethazine Hcl]      \"I get very agitated\"   • Penicillins      \"Had some dizziness\"  Tolerated Unasyn 10/2019     SURGHX:   Past Surgical History:   Procedure Laterality Date   • PB TOTAL KNEE ARTHROPLASTY Right 7/22/2020    Procedure: ARTHROPLASTY, KNEE, TOTAL;  Surgeon: Temo Pires M.D.;  Location: SURGERY [facility];  Service: Orthopedics   • TENDON TRANSFER Right 1/22/2020    Procedure: RIGHT DISTAL ULNA RESECTION AND EXTENSOR TENDON TRANSFER;  Surgeon: Marine Rushing M.D.;  Location: [facility];  Service: Orthopedics   • IRRIGATION & DEBRIDEMENT ORTHO Left 10/9/2019    Procedure: IRRIGATION AND DEBRIDEMENT, WOUND - PELVIC OSTEOMYELITIS;  Surgeon: You Olivares M.D.;  Location: [facility];  Service: Orthopedics   • OTHER SURGICAL PROCEDURE  2019    osteomelitis of pelvis cleaned   • GASTRIC BYPASS LAPAROSCOPIC  2000    Lucila en y   • CHOLECYSTECTOMY  2000   • OTHER SURGICAL PROCEDURE      repair of broken penis     SOCHX:  reports that he has been smoking cigarettes and cigars. He has a 0.75 pack-year smoking history. He has never used smokeless tobacco. He reports that he does not drink alcohol or use drugs.  FH: Family history was reviewed, no pertinent findings to report     Objective:     /80   Pulse 89   Temp 37.2 °C (98.9 °F) (Temporal)   Resp 16   Ht 1.702 m (5' 7\")   Wt 106.6 kg (235 lb)   SpO2 100%   " BMI 36.81 kg/m²      Physical Exam  Constitutional:       Appearance: He is well-developed.   HENT:      Head: Normocephalic and atraumatic.      Right Ear: External ear normal.      Left Ear: External ear normal.   Eyes:      Conjunctiva/sclera: Conjunctivae normal.      Pupils: Pupils are equal, round, and reactive to light.   Neck:      Musculoskeletal: Normal range of motion.   Cardiovascular:      Rate and Rhythm: Normal rate and regular rhythm.      Heart sounds: Normal heart sounds. No murmur.   Pulmonary:      Effort: Pulmonary effort is normal.      Breath sounds: Normal breath sounds. No wheezing.   Musculoskeletal:      Comments: Right hip: Full ROM of right hip.  Very mild pain posteriorly with internal/external rotation.  There is some ecchymosis noted on the lateral aspect of the right hip overlying the greater trochanter as well as on the right buttocks overlying the sacroiliac joint.    Left foot/ankle: Left ankle exhibits mild tenderness over the lateral malleolus.  Patient has lower extremity edema bilaterally.  No ecchymosis of the left ankle.  Full ROM.  Patient has tenderness over the second through fourth metatarsals of the left foot with some mild overlying ecchymosis noted.  Full ROM.  DP pulses are 2+ and symmetric bilaterally.  Cap refill less than 2 seconds.  Antalgic gait.   Lymphadenopathy:      Cervical: No cervical adenopathy.   Skin:     General: Skin is warm and dry.      Capillary Refill: Capillary refill takes less than 2 seconds.   Neurological:      Mental Status: He is alert and oriented to person, place, and time.   Psychiatric:         Behavior: Behavior normal.         Judgment: Judgment normal.             DX-ANKLE 3+ VIEWS LEFT  IMPRESSION:     1.  No evidence of fracture or dislocation.     2.  Findings consistent with osteoarthritis.     3.  Osteopenia and soft tissue edema or swelling also noted.    DX-FOOT-COMPLETE 3+ LEFT   IMPRESSION:     1.  Acute versus subacute  fractures of the distal third and fourth metatarsal bones are identified. Fracture lines do not appear to extend to the distal articular surface.          DX-HIP-COMPLETE - UNILATERAL 2+ RIGHT  IMPRESSION:     1.  No radiographic evidence of acute traumatic injury.     2.  Findings are consistent with mild osteoarthritis.          Assessment/Plan:     1. Acute left ankle pain  - DX-ANKLE 3+ VIEWS LEFT; Future    2. Injury of left foot, initial encounter  - DX-FOOT-COMPLETE 3+ LEFT; Future    3. Right hip pain  - DX-HIP-COMPLETE - UNILATERAL 2+ RIGHT; Future    4. Fall, initial encounter  - DX-FOOT-COMPLETE 3+ LEFT; Future  - DX-ANKLE 3+ VIEWS LEFT; Future  - DX-HIP-COMPLETE - UNILATERAL 2+ RIGHT; Future    5. Fracture of unspecified metatarsal bone(s), left foot, initial encounter for closed fracture  - REFERRAL TO ORTHOPEDICS  -Short walking boot provided  -Patient was advised to keep the extremity elevated and apply ice multiple times per day  -Patient will follow up with his PCP and his orthopedic doctor next week for reevaluation            Differential Diagnosis, natural history, and supportive care discussed. Return to the Urgent Care or follow up with your PCP if symptoms fail to resolve, or for any new or worsening symptoms. Emergency room precautions discussed. Patient and/or family appears understanding of information.

## 2021-01-04 DIAGNOSIS — M25.562 ACUTE PAIN OF LEFT KNEE: ICD-10-CM

## 2021-01-05 ENCOUNTER — APPOINTMENT (OUTPATIENT)
Dept: RADIOLOGY | Facility: IMAGING CENTER | Age: 46
End: 2021-01-05
Attending: PHYSICIAN ASSISTANT
Payer: COMMERCIAL

## 2021-01-05 ENCOUNTER — APPOINTMENT (OUTPATIENT)
Dept: URGENT CARE | Facility: PHYSICIAN GROUP | Age: 46
End: 2021-01-05
Payer: COMMERCIAL

## 2021-01-05 DIAGNOSIS — M25.562 ACUTE PAIN OF LEFT KNEE: ICD-10-CM

## 2021-01-05 PROCEDURE — 73562 X-RAY EXAM OF KNEE 3: CPT | Mod: TC,FY,LT | Performed by: PHYSICIAN ASSISTANT

## 2021-01-07 ENCOUNTER — PATIENT MESSAGE (OUTPATIENT)
Dept: MEDICAL GROUP | Facility: MEDICAL CENTER | Age: 46
End: 2021-01-07

## 2021-01-07 DIAGNOSIS — G47.01 INSOMNIA DUE TO MEDICAL CONDITION: ICD-10-CM

## 2021-01-07 RX ORDER — ZOLPIDEM TARTRATE 5 MG/1
5 TABLET ORAL NIGHTLY PRN
Qty: 30 TAB | Refills: 2 | Status: SHIPPED | OUTPATIENT
Start: 2021-01-07 | End: 2021-08-06 | Stop reason: SDUPTHER

## 2021-01-07 NOTE — TELEPHONE ENCOUNTER
From: Richard Hubbard II  To: MARTÍN Turner  Sent: 1/7/2021 8:24 AM PST  Subject: Prescription Question    Alec Perea,  Can you send a refill for my Zolpidem in to the [facility] in [place] for me. I appreciate your help! Have a great day!    Jeovanny Hubbard

## 2021-01-11 ENCOUNTER — PATIENT MESSAGE (OUTPATIENT)
Dept: BEHAVIORAL HEALTH | Facility: MEDICAL CENTER | Age: 46
End: 2021-01-11

## 2021-01-11 DIAGNOSIS — I48.0 PAROXYSMAL ATRIAL FIBRILLATION (HCC): ICD-10-CM

## 2021-01-11 DIAGNOSIS — M17.10 ARTHRITIS OF KNEE: ICD-10-CM

## 2021-01-11 DIAGNOSIS — G89.29 CHRONIC KNEE PAIN, UNSPECIFIED LATERALITY: ICD-10-CM

## 2021-01-11 DIAGNOSIS — M25.569 CHRONIC KNEE PAIN, UNSPECIFIED LATERALITY: ICD-10-CM

## 2021-01-11 DIAGNOSIS — Z98.84 HISTORY OF GASTRIC BYPASS: ICD-10-CM

## 2021-01-11 DIAGNOSIS — G89.29 CHRONIC PAIN OF RIGHT KNEE: ICD-10-CM

## 2021-01-11 DIAGNOSIS — M06.9 RHEUMATOID ARTHRITIS INVOLVING MULTIPLE SITES (HCC): ICD-10-CM

## 2021-01-11 DIAGNOSIS — Z96.651 S/P TOTAL KNEE ARTHROPLASTY, RIGHT: ICD-10-CM

## 2021-01-11 DIAGNOSIS — Z87.11 HISTORY OF GASTRIC ULCER: ICD-10-CM

## 2021-01-11 DIAGNOSIS — M25.561 CHRONIC PAIN OF RIGHT KNEE: ICD-10-CM

## 2021-01-11 DIAGNOSIS — M79.2 NEUROPATHIC PAIN: ICD-10-CM

## 2021-01-11 DIAGNOSIS — R53.81 DEBILITY: ICD-10-CM

## 2021-01-12 ENCOUNTER — HOSPITAL ENCOUNTER (OUTPATIENT)
Facility: MEDICAL CENTER | Age: 46
End: 2021-01-12
Attending: ORTHOPAEDIC SURGERY | Admitting: ORTHOPAEDIC SURGERY
Payer: COMMERCIAL

## 2021-01-12 RX ORDER — PREGABALIN 150 MG/1
150 CAPSULE ORAL 3 TIMES DAILY
Qty: 90 CAP | Refills: 2 | Status: SHIPPED | OUTPATIENT
Start: 2021-01-12 | End: 2021-04-02 | Stop reason: SDUPTHER

## 2021-01-13 NOTE — TELEPHONE ENCOUNTER
From: Richard Hubbard II  To: Jae Whitney M.D.  Sent: 1/11/2021 4:48 PM PST  Subject: Prescription Question    Dr. Whitney,  I am due to have my Pregabalin 150mg 3 times daily refilled. I have had refills aminata Dr. Gutierrez up until now but those have run out so if you could send it into the [facility] in [place] I would appreciate it. Thanks!  Jeovanny

## 2021-01-14 ENCOUNTER — TELEMEDICINE (OUTPATIENT)
Dept: MEDICAL GROUP | Facility: MEDICAL CENTER | Age: 46
End: 2021-01-14
Payer: COMMERCIAL

## 2021-01-14 DIAGNOSIS — Z79.891 CHRONIC USE OF OPIATE DRUG FOR THERAPEUTIC PURPOSE: ICD-10-CM

## 2021-01-14 DIAGNOSIS — G89.29 OTHER CHRONIC PAIN: ICD-10-CM

## 2021-01-14 DIAGNOSIS — Z91.81 AT HIGH RISK FOR FALLS: ICD-10-CM

## 2021-01-14 DIAGNOSIS — G89.29 CHRONIC PAIN OF LEFT KNEE: ICD-10-CM

## 2021-01-14 DIAGNOSIS — F39 MOOD DISORDER (HCC): Chronic | ICD-10-CM

## 2021-01-14 DIAGNOSIS — M25.562 CHRONIC PAIN OF LEFT KNEE: ICD-10-CM

## 2021-01-14 DIAGNOSIS — F33.2 SEVERE EPISODE OF RECURRENT MAJOR DEPRESSIVE DISORDER, WITHOUT PSYCHOTIC FEATURES (HCC): ICD-10-CM

## 2021-01-14 DIAGNOSIS — F41.1 GAD (GENERALIZED ANXIETY DISORDER): ICD-10-CM

## 2021-01-14 PROCEDURE — 99214 OFFICE O/P EST MOD 30 MIN: CPT | Mod: 95,CR | Performed by: NURSE PRACTITIONER

## 2021-01-14 RX ORDER — LIFITEGRAST 50 MG/ML
SOLUTION/ DROPS OPHTHALMIC
COMMUNITY
Start: 2020-11-05 | End: 2023-01-01

## 2021-01-14 NOTE — PROGRESS NOTES
Telemedicine Video Visit: Established Patient   This Remote Face to Face encounter was conducted via Zoom. Given the importance of social distancing and other strategies recommended to reduce the risk of COVID-19 transmission, I am providing medical care to this patient via audio/video visit in place of an in person visit at the request of the patient. Verbal consent to telehealth, risks, benefits, and consequences were discussed. Patient retains the right to withdraw at any time. All existing confidentiality protections apply. The patient has access to all transmitted medical information. No dissemination of any patient images or information to other entities without further written consent.  Subjective:     Chief Complaint   Patient presents with   • Knee Injury     L side, due to a fall DOI: 1/14/2021, poss knee X-Ray needed   • Referral Needed     Psychiatry       Richard Hubbard II is a 45 y.o. male presenting for evaluation and management of:    Patient presents today with complaints of having left knee pain.  States that he did have a fall was he tripped over something in his house.  He just had a knee x-ray few weeks ago without any acute fracture or trauma.  He is able to walk on it now.    Patient also mentions that he has been feeling more depressed lately.  He denies having any plans for suicide although sometimes he has fleeting intrusive thoughts.  Denies any manic episodes.  There are no guns in the home.  Patient is very tearful during this discussion and is quite upset about how he feels.  Has not been going to any counseling at this time.  Had tried to follow back up with behavioral health however there was a disconnect with scheduling.  He expresses his frustration about trying to get support with psychiatry and psychology.  At this time he feels as if an inpatient program would be helpful for him.  Has done this in the past.  States he benefited from this.  Based on our discussions today he  "would likely greatly benefit from this.  He struggles with the fact that he is unable to take care of his children due to his chronic medical illness and physical activity.  Has a supportive wife.    At 1 point he states he was on Effexor for his bipolar.  States it worked fairly well for him.  No recall of serious side effects.    At this time he will look into programs that offer inpatient treatment and will message regarding facility.  Will place referral once he has done so.    Chronic pain  Patient is on chronic opiates at this time.  He would also like to eventually wean off of these medications as well.  This also contributes to his depression.    ROS depressed, anxious, pain.  Denies any recent fevers or chills. No nausea or vomiting. No chest pains or shortness of breath.     Allergies   Allergen Reactions   • Benadryl [Altaryl]      \"I get agitated\"   • Nsaids      Bleeding, \"I had a bleeding ulcer\"   • Phenergan [Promethazine Hcl]      \"I get very agitated\"   • Penicillins      \"Had some dizziness\"  Tolerated Unasyn 10/2019       Current medicines (including changes today)  Current Outpatient Medications   Medication Sig Dispense Refill   • pregabalin (LYRICA) 150 MG Cap Take 1 Cap by mouth 3 times a day for 90 days. 90 Cap 2   • zolpidem (AMBIEN) 5 MG Tab Take 1 Tab by mouth at bedtime as needed for up to 30 days. 30 Tab 2   • gabapentin (NEURONTIN) 300 MG Cap Take 1 Cap by mouth 3 times a day. 90 Cap 1   • morphine (MS IR) 15 MG tablet Take 1 Tab by mouth every 6 hours as needed for Severe Pain for up to 30 days. 120 Tab 0   • KEVZARA 200 MG/1.14ML Solution Auto-injector      • tobramycin-dexamethasone (TOBRADEX) 0.3-0.1 % Suspension      • losartan (COZAAR) 25 MG Tab Take 1 Tab by mouth every day. 90 Tab 3   • DULoxetine (CYMBALTA) 30 MG Cap DR Particles Take 1 Cap by mouth every day. 30 Cap 2   • omeprazole (PRILOSEC) 40 MG delayed-release capsule Take 1 Cap by mouth every day. 30 Cap 6   • " betamethasone dipropionate 0.05 % Cream Apply 1 g to affected area(s) 2 times a day. 45 g 0   • ascorbic acid (VITAMIN C) 500 MG tablet Take 1 Tab by mouth 2 times a day, with meals. 60 Tab 0   • aspirin 81 MG EC tablet Take 1 Tab by mouth every day. 90 Tab 2   • ferrous sulfate 325 (65 Fe) MG tablet Take 1 Tab by mouth 2 times a day, with meals. 60 Tab 0   • vitamin D 2000 UNIT Tab Take 1 Tab by mouth every day. 60 Tab    • docusate sodium 100 MG Cap Take 100 mg by mouth 2 Times a Day. 60 Cap    • acetaminophen (TYLENOL) 500 MG Tab Take 500-1,000 mg by mouth every 6 hours as needed.     • Naloxone (NARCAN) 4 MG/0.1ML Liquid One spray in one nostril for overdose and call 911. (Patient taking differently: Spray 1 Spray in nose as needed. One spray in one nostril for overdose and call 911.) 1 Package 0   • XIIDRA 5 % Solution      • nicotine (NICODERM) 7 MG/24HR PATCH 24 HR Apply 1 Patch to skin as directed every 24 hours. (Patient not taking: Reported on 10/14/2020) 30 Patch 3     No current facility-administered medications for this visit.        Patient Active Problem List    Diagnosis Date Noted   • Paroxysmal atrial fibrillation ([facility]) 07/23/2020     Priority: Low   • Essential hypertension 09/13/2019     Priority: Low   • Rheumatoid arthritis ([facility]) 09/08/2015     Priority: Low   • Chronic pain of right knee 09/08/2015     Priority: Low   • Chronic use of opiate drug for therapeutic purpose 01/18/2021   • Other chronic pain 01/18/2021   • At high risk for falls 12/10/2020   • Localized osteoporosis without current pathological fracture 10/19/2020   • History of osteomyelitis 08/14/2020   • History of immunosuppressive therapy 08/14/2020   • S/P total knee arthroplasty, right 07/27/2020   • Cigarette nicotine dependence without complication 07/21/2020   • Aortic root dilatation ([facility]) 09/13/2019   • Aortic stenosis, moderate 09/12/2019   • MONICA (obstructive sleep apnea) 03/09/2010   • Mood disorder ([facility]) 05/21/2009    • Anxiety disorder 05/21/2009       Family History   Problem Relation Age of Onset   • Hypertension Mother    • Arthritis Mother    • Depression Mother    • Cancer Father         bladder   • Schizophrenia Father    • Cancer Maternal Grandmother         breast   • Heart Disease Maternal Grandmother    • Psychiatric Illness Other    • Stroke Maternal Aunt    • Other Neg Hx         no connective tissue disorders or known aortic disease       He  has a past medical history of ADD (attention deficit disorder), Alcohol abuse, Allergic rhinitis (5/21/2009), Anemia (09/2019), Anxiety (5/21/2009), Arrhythmia, Back pain (5/21/2009), Bipolar disorder ([facility]), Bleeding ulcer (5/21/2009), Bleeding ulcer (5/21/2009), Depression (5/21/2009), Eczema (5/21/2009), GERD (gastroesophageal reflux disease), Heart murmur, Hemorrhagic disorder ([facility]), History of bleeding ulcers (2/12/2015), Insomnia (5/21/2009), Migraine (5/21/2009), Obesity, morbid ([facility]) (5/21/2009), Osteomyelitis ([facility]) (10/7/2019), Pain (09/2019), Pubic ramus fracture ([facility]) (9/28/2019), Rheumatoid arteritis ([facility]) (09/2019), Sleep apnea (5/21/2009), and Snoring. He also has no past medical history of Hyperlipidemia.  He  has a past surgical history that includes gastric bypass laparoscopic (2000); cholecystectomy (2000); irrigation & debridement ortho (Left, 10/9/2019); tendon transfer (Right, 1/22/2020); other surgical procedure; other surgical procedure (2019); and pr total knee arthroplasty (Right, 7/22/2020).       Objective:   Vitals obtained by patient:  There were no vitals taken for this visit.    Physical Exam: Crying, tearful  Constitutional: Alert, no distress, well-groomed.  Skin: No rashes in visible areas.  Eye: Round. Conjunctiva clear, lids normal. No icterus.   ENMT: Lips pink without lesions, good dentition, moist mucous membranes. Phonation normal.  Neck: No masses, no thyromegaly. Moves freely without pain.  CV: Pulse as reported by  patient  Respiratory: Unlabored respiratory effort, no cough or audible wheeze  Psych: Alert and oriented x3, depressed, crying      Assessment and Plan:   The following treatment plan was discussed:     1. Chronic pain of left knee  Chronic.  Most recent imaging a few weeks ago without any acute trauma.  He is able to walk on at this time.  No increased swelling or bruising at this time.  Continue to follow-up with orthopedics.    2. Mood disorder (HCC)  Not well controlled.  Has not been on any medication for quite some time.  Previously reviewing records he has been on Lamictal and Effexor.  States he tolerated this well. Have discussed restarting.  He will message in one week to update if he has found an inpatient treatment program.  - REFERRAL TO BEHAVIORAL HEALTH    3. ELISEO (generalized anxiety disorder)  Chronic. Not well controlled. Needs intensive therapy.  - REFERRAL TO BEHAVIORAL HEALTH    4. Severe episode of recurrent major depressive disorder, without psychotic features (HCC)  Not well controlled.  Denies having a plan for suicide although does have intrusive thoughts.  He has a supportive wife.  Recommend inpatient treatment at this time.  Patient is amenable to this.  He is looking to seek outside of the [facility] system as there is been persistent difficulty with scheduling.      5. At high risk for falls  This is a chronic problem for the patient as he does have balance issues, chronic comorbidities, osteopenia, rheumatoid arthritis, joint instability.  Have discussed precautions and concern for high risk with fall with injury..    6. Chronic use of opiate drug for therapeutic purpose  Followed by pain management.  On chronic opiate therapy.    7. Other chronic pain  On chronic opiate therapy.     Other orders  - XIIDRA 5 % Solution          Follow-up: No follow-ups on file.    Face to Face Video Visit:     MARTÍN Turner

## 2021-01-18 PROBLEM — Z79.891 CHRONIC USE OF OPIATE DRUG FOR THERAPEUTIC PURPOSE: Status: ACTIVE | Noted: 2021-01-18

## 2021-01-18 PROBLEM — G89.29 OTHER CHRONIC PAIN: Status: ACTIVE | Noted: 2021-01-18

## 2021-01-19 ENCOUNTER — TELEMEDICINE (OUTPATIENT)
Dept: SLEEP MEDICINE | Facility: MEDICAL CENTER | Age: 46
End: 2021-01-19
Payer: COMMERCIAL

## 2021-01-19 DIAGNOSIS — Z79.891 CHRONIC USE OF OPIATE DRUG FOR THERAPEUTIC PURPOSE: ICD-10-CM

## 2021-01-19 DIAGNOSIS — I10 ESSENTIAL HYPERTENSION: ICD-10-CM

## 2021-01-19 DIAGNOSIS — G47.33 OSA (OBSTRUCTIVE SLEEP APNEA): Chronic | ICD-10-CM

## 2021-01-19 DIAGNOSIS — I48.0 PAROXYSMAL ATRIAL FIBRILLATION (HCC): ICD-10-CM

## 2021-01-19 PROCEDURE — 99204 OFFICE O/P NEW MOD 45 MIN: CPT | Mod: 95,CR | Performed by: INTERNAL MEDICINE

## 2021-01-19 NOTE — PROGRESS NOTES
"Telemedicine: New Patient   This evaluation was conducted via [employer] using secure and encrypted videoconferencing technology.  The patient's identity was verified.    Subjective:     CC:   Chief Complaint   Patient presents with   • New Patient     NP referred by MARTÍN Turner on 8/31/20 for G47.33 (ICD-10-CM) - MONICA (obstructive sleep apnea)   • Other     Pt states prev CPAP use. Unsure of when last SS was performed.       Richard Hubbard II is a 45 y.o. male presenting to establish care and to discuss the evaluation and management of obstructive sleep apnea.  He has a longtime history of MONICA, [place].  He has tried CPAP/BiPAP on and off since then and underwent gastric bypass surgery.  His last sleep study was through [facility] in 2016 showing moderate MONICA, AHI: 22/h.  He feels his sleep is worsening and he is experiencing more adverse effects from his sleep apnea (hypersomnolence, difficulty concentrating) and recommited to trying CPAP.    He has chronic pain with narcotic dependence.  In terms of sleep habits, his bedtimes fluctuates however he tries to attain 7 hours of bedtime.  He takes Ambien as a sleep aid.  Has snoring and wakes up with dry mouth.  He gets up by 7:30 AM, feeling tired.  Describes excessive daytime hypersomnolence.  Weight has been stable.        ROS:as in HPI      Allergies   Allergen Reactions   • Benadryl [Altaryl]      \"I get agitated\"   • Nsaids      Bleeding, \"I had a bleeding ulcer\"   • Phenergan [Promethazine Hcl]      \"I get very agitated\"   • Penicillins      \"Had some dizziness\"  Tolerated Unasyn 10/2019       Current medicines (including changes today)  Current Outpatient Medications   Medication Sig Dispense Refill   • XIIDRA 5 % Solution      • zolpidem (AMBIEN) 5 MG Tab Take 1 Tab by mouth at bedtime as needed for up to 30 days. 30 Tab 2   • gabapentin (NEURONTIN) 300 MG Cap Take 1 Cap by mouth 3 times a day. 90 Cap 1 "   • morphine (MS IR) 15 MG tablet Take 1 Tab by mouth every 6 hours as needed for Severe Pain for up to 30 days. 120 Tab 0   • KEVZARA 200 MG/1.14ML Solution Auto-injector      • tobramycin-dexamethasone (TOBRADEX) 0.3-0.1 % Suspension      • losartan (COZAAR) 25 MG Tab Take 1 Tab by mouth every day. 90 Tab 3   • DULoxetine (CYMBALTA) 30 MG Cap DR Particles Take 1 Cap by mouth every day. 30 Cap 2   • omeprazole (PRILOSEC) 40 MG delayed-release capsule Take 1 Cap by mouth every day. 30 Cap 6   • betamethasone dipropionate 0.05 % Cream Apply 1 g to affected area(s) 2 times a day. 45 g 0   • ascorbic acid (VITAMIN C) 500 MG tablet Take 1 Tab by mouth 2 times a day, with meals. 60 Tab 0   • aspirin 81 MG EC tablet Take 1 Tab by mouth every day. 90 Tab 2   • ferrous sulfate 325 (65 Fe) MG tablet Take 1 Tab by mouth 2 times a day, with meals. 60 Tab 0   • vitamin D 2000 UNIT Tab Take 1 Tab by mouth every day. 60 Tab    • docusate sodium 100 MG Cap Take 100 mg by mouth 2 Times a Day. 60 Cap    • acetaminophen (TYLENOL) 500 MG Tab Take 500-1,000 mg by mouth every 6 hours as needed.     • Naloxone (NARCAN) 4 MG/0.1ML Liquid One spray in one nostril for overdose and call 911. (Patient taking differently: Spray 1 Spray in nose as needed. One spray in one nostril for overdose and call 911.) 1 Package 0   • pregabalin (LYRICA) 150 MG Cap Take 1 Cap by mouth 3 times a day for 90 days. 90 Cap 2   • nicotine (NICODERM) 7 MG/24HR PATCH 24 HR Apply 1 Patch to skin as directed every 24 hours. (Patient not taking: Reported on 10/14/2020) 30 Patch 3     No current facility-administered medications for this visit.        He  has a past medical history of ADD (attention deficit disorder), Alcohol abuse, Allergic rhinitis (5/21/2009), Anemia (09/2019), Anxiety (5/21/2009), Apnea, sleep, Arrhythmia, Back pain (5/21/2009), Bipolar disorder (HCC), Bleeding ulcer (5/21/2009), Bleeding ulcer (5/21/2009), Daytime sleepiness, Depression  (2009), Eczema (2009), GERD (gastroesophageal reflux disease), Heart murmur, Hemorrhagic disorder ([facility]), History of bleeding ulcers (2015), Insomnia (2009), Migraine (2009), Morning headache, Obesity, morbid (HCC) (2009), Osteomyelitis ([facility]) (10/7/2019), Pain (2019), Pubic ramus fracture ([facility]) (2019), Rheumatoid arteritis ([facility]) (2019), Sleep apnea (2009), and Snoring. He also has no past medical history of Hyperlipidemia.  He  has a past surgical history that includes gastric bypass laparoscopic (); cholecystectomy (); irrigation & debridement ortho (Left, 10/9/2019); tendon transfer (Right, 2020); other surgical procedure; other surgical procedure (); and pr total knee arthroplasty (Right, 2020).      Family History   Problem Relation Age of Onset   • Hypertension Mother    • Arthritis Mother    • Depression Mother    • Cancer Father         bladder   • Schizophrenia Father    • Cancer Maternal Grandmother         breast   • Heart Disease Maternal Grandmother    • Psychiatric Illness Other    • Stroke Maternal Aunt    • Other Neg Hx         no connective tissue disorders or known aortic disease     Family Status   Relation Name Status   • Mo  Alive   • Fa     • MGMo  (Not Specified)   • OTHER  (Not Specified)   • MAunt  (Not Specified)   • Neg Hx  (Not Specified)       Patient Active Problem List    Diagnosis Date Noted   • Paroxysmal atrial fibrillation (HCC) 2020     Priority: Low   • Essential hypertension 2019     Priority: Low   • Rheumatoid arthritis (HCC) 2015     Priority: Low   • Chronic pain of right knee 2015     Priority: Low   • Chronic use of opiate drug for therapeutic purpose 2021   • Other chronic pain 2021   • At high risk for falls 12/10/2020   • Localized osteoporosis without current pathological fracture 10/19/2020   • History of osteomyelitis 2020   • History of  immunosuppressive therapy 08/14/2020   • S/P total knee arthroplasty, right 07/27/2020   • Cigarette nicotine dependence without complication 07/21/2020   • Aortic root dilatation (HCC) 09/13/2019   • Aortic stenosis, moderate 09/12/2019   • MONICA (obstructive sleep apnea) 03/09/2010   • Mood disorder (HCC) 05/21/2009   • Anxiety disorder 05/21/2009          Objective:   There were no vitals taken for this visit.    Physical Exam  AOx3 in NAD.    Assessment and Plan:   The following treatment plan was discussed:     1. MONICA (obstructive sleep apnea)  - Polysomnography, 4 or More; Future    2. Chronic use of opiate drug for therapeutic purpose    3. Paroxysmal atrial fibrillation (HCC)    4. Essential hypertension    The patient has a longtime history of obstructive sleep apnea, first diagnosed 20 years ago, and has tried CPAP therapy on and off since then.  He has nonrestorative sleep, snoring and excessive daytime hypersomnolence associated with his sleep apnea.  He also has chronic narcotic dependence predisposing to central sleep apnea.  He is committed to a retrial on CPAP/BiPAP therapy.    We will arrange for polysomnography in the sleep laboratory for reevaluation.  We also discussed alternative therapy such as Inspire if he is intolerant of CPAP use.  Follow-up: Return for after sleep study.

## 2021-01-26 DIAGNOSIS — G89.4 CHRONIC PAIN DISORDER: ICD-10-CM

## 2021-01-26 RX ORDER — GABAPENTIN 300 MG/1
300 CAPSULE ORAL 3 TIMES DAILY
Qty: 90 CAP | Refills: 1 | Status: SHIPPED | OUTPATIENT
Start: 2021-01-26 | End: 2021-04-12 | Stop reason: SDUPTHER

## 2021-01-26 RX ORDER — MORPHINE SULFATE 15 MG/1
15 TABLET ORAL EVERY 6 HOURS PRN
Qty: 120 TAB | Refills: 0 | Status: SHIPPED | OUTPATIENT
Start: 2021-01-26 | End: 2021-02-23 | Stop reason: SDUPTHER

## 2021-01-26 NOTE — TELEPHONE ENCOUNTER
Received request via: Patient    Was the patient seen in the last year in this department? Yes    Does the patient have an active prescription (recently filled or refills available) for medication(s) requested? No       Requested Prescriptions     Pending Prescriptions Disp Refills   • gabapentin (NEURONTIN) 300 MG Cap 90 Cap 1     Sig: Take 1 Cap by mouth 3 times a day.   • morphine (MS IR) 15 MG tablet 120 Tab 0     Sig: Take 1 Tab by mouth every 6 hours as needed for Severe Pain for up to 30 days.             ----- Message from Richard Hubbard II sent at 1/26/2021 12:19 PM PST -----  Regarding: Prescription Question  Contact: [phone]  Dr. Whitney,  I am requesting refills on my Gabepentin and morphine please. Thanks so much!    Jeovanny Hubbard

## 2021-01-26 NOTE — TELEPHONE ENCOUNTER
----- Message from Richard Hubbard II sent at 1/26/2021 12:19 PM PST -----  Regarding: Prescription Question  Contact: [phone]  Dr. Whitney,  I am requesting refills on my Gabepentin and morphine please. Thanks so much!    Jeovanny Hubbard

## 2021-01-28 ENCOUNTER — HOSPITAL ENCOUNTER (OUTPATIENT)
Dept: LAB | Facility: MEDICAL CENTER | Age: 46
End: 2021-01-28
Attending: INTERNAL MEDICINE
Payer: COMMERCIAL

## 2021-01-28 LAB
25(OH)D3 SERPL-MCNC: 28 NG/ML (ref 30–100)
ALBUMIN SERPL BCP-MCNC: 3.8 G/DL (ref 3.2–4.9)
ALBUMIN/GLOB SERPL: 1.5 G/DL
ALP SERPL-CCNC: 121 U/L (ref 30–99)
ALT SERPL-CCNC: 20 U/L (ref 2–50)
ANION GAP SERPL CALC-SCNC: 5 MMOL/L (ref 7–16)
AST SERPL-CCNC: 21 U/L (ref 12–45)
BASOPHILS # BLD AUTO: 1.2 % (ref 0–1.8)
BASOPHILS # BLD: 0.07 K/UL (ref 0–0.12)
BILIRUB SERPL-MCNC: 0.3 MG/DL (ref 0.1–1.5)
BUN SERPL-MCNC: 15 MG/DL (ref 8–22)
CALCIUM SERPL-MCNC: 8.8 MG/DL (ref 8.5–10.5)
CHLORIDE SERPL-SCNC: 105 MMOL/L (ref 96–112)
CHOLEST SERPL-MCNC: 130 MG/DL (ref 100–199)
CO2 SERPL-SCNC: 27 MMOL/L (ref 20–33)
CREAT SERPL-MCNC: 0.81 MG/DL (ref 0.5–1.4)
CRP SERPL HS-MCNC: 0.07 MG/DL (ref 0–0.75)
EOSINOPHIL # BLD AUTO: 0.35 K/UL (ref 0–0.51)
EOSINOPHIL NFR BLD: 6.2 % (ref 0–6.9)
ERYTHROCYTE [DISTWIDTH] IN BLOOD BY AUTOMATED COUNT: 47.3 FL (ref 35.9–50)
ERYTHROCYTE [SEDIMENTATION RATE] IN BLOOD BY WESTERGREN METHOD: <1 MM/HOUR (ref 0–15)
FASTING STATUS PATIENT QL REPORTED: NORMAL
GLOBULIN SER CALC-MCNC: 2.6 G/DL (ref 1.9–3.5)
GLUCOSE SERPL-MCNC: 75 MG/DL (ref 65–99)
HCT VFR BLD AUTO: 41 % (ref 42–52)
HDLC SERPL-MCNC: 68 MG/DL
HGB BLD-MCNC: 13.1 G/DL (ref 14–18)
IMM GRANULOCYTES # BLD AUTO: 0.02 K/UL (ref 0–0.11)
IMM GRANULOCYTES NFR BLD AUTO: 0.4 % (ref 0–0.9)
LDLC SERPL CALC-MCNC: 48 MG/DL
LYMPHOCYTES # BLD AUTO: 1.48 K/UL (ref 1–4.8)
LYMPHOCYTES NFR BLD: 26.4 % (ref 22–41)
MCH RBC QN AUTO: 30.9 PG (ref 27–33)
MCHC RBC AUTO-ENTMCNC: 32 G/DL (ref 33.7–35.3)
MCV RBC AUTO: 96.7 FL (ref 81.4–97.8)
MONOCYTES # BLD AUTO: 0.64 K/UL (ref 0–0.85)
MONOCYTES NFR BLD AUTO: 11.4 % (ref 0–13.4)
NEUTROPHILS # BLD AUTO: 3.05 K/UL (ref 1.82–7.42)
NEUTROPHILS NFR BLD: 54.4 % (ref 44–72)
NRBC # BLD AUTO: 0 K/UL
NRBC BLD-RTO: 0 /100 WBC
PLATELET # BLD AUTO: 189 K/UL (ref 164–446)
PMV BLD AUTO: 9.5 FL (ref 9–12.9)
POTASSIUM SERPL-SCNC: 4.7 MMOL/L (ref 3.6–5.5)
PROT SERPL-MCNC: 6.4 G/DL (ref 6–8.2)
RBC # BLD AUTO: 4.24 M/UL (ref 4.7–6.1)
SODIUM SERPL-SCNC: 137 MMOL/L (ref 135–145)
TRIGL SERPL-MCNC: 72 MG/DL (ref 0–149)
WBC # BLD AUTO: 5.6 K/UL (ref 4.8–10.8)

## 2021-01-28 PROCEDURE — 85652 RBC SED RATE AUTOMATED: CPT

## 2021-01-28 PROCEDURE — 36415 COLL VENOUS BLD VENIPUNCTURE: CPT

## 2021-01-28 PROCEDURE — 80061 LIPID PANEL: CPT

## 2021-01-28 PROCEDURE — 82306 VITAMIN D 25 HYDROXY: CPT

## 2021-01-28 PROCEDURE — 85025 COMPLETE CBC W/AUTO DIFF WBC: CPT

## 2021-01-28 PROCEDURE — 86140 C-REACTIVE PROTEIN: CPT

## 2021-01-28 PROCEDURE — 80053 COMPREHEN METABOLIC PANEL: CPT

## 2021-02-05 ENCOUNTER — HOSPITAL ENCOUNTER (OUTPATIENT)
Dept: RADIOLOGY | Facility: MEDICAL CENTER | Age: 46
End: 2021-02-05
Attending: PHYSICIAN ASSISTANT
Payer: COMMERCIAL

## 2021-02-05 ENCOUNTER — OFFICE VISIT (OUTPATIENT)
Dept: URGENT CARE | Facility: PHYSICIAN GROUP | Age: 46
End: 2021-02-05
Payer: COMMERCIAL

## 2021-02-05 VITALS
RESPIRATION RATE: 20 BRPM | TEMPERATURE: 98 F | HEIGHT: 71 IN | HEART RATE: 77 BPM | WEIGHT: 220 LBS | SYSTOLIC BLOOD PRESSURE: 120 MMHG | OXYGEN SATURATION: 97 % | DIASTOLIC BLOOD PRESSURE: 84 MMHG | BODY MASS INDEX: 30.8 KG/M2
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
## 2021-02-05 DIAGNOSIS — S79.911A HIP INJURY, RIGHT, INITIAL ENCOUNTER: ICD-10-CM

## 2021-02-05 DIAGNOSIS — S70.01XA CONTUSION OF RIGHT HIP, INITIAL ENCOUNTER: ICD-10-CM

## 2021-02-05 PROCEDURE — 99213 OFFICE O/P EST LOW 20 MIN: CPT | Performed by: PHYSICIAN ASSISTANT

## 2021-02-05 PROCEDURE — 73502 X-RAY EXAM HIP UNI 2-3 VIEWS: CPT | Mod: RT

## 2021-02-05 ASSESSMENT — ENCOUNTER SYMPTOMS
TINGLING: 0
PALPITATIONS: 0
VOMITING: 0
FEVER: 0
SORE THROAT: 0
FALLS: 1
NAUSEA: 0
CHILLS: 0
SHORTNESS OF BREATH: 0
SENSORY CHANGE: 0
BLURRED VISION: 0

## 2021-02-05 ASSESSMENT — PAIN SCALES - GENERAL: PAINLEVEL: 8=MODERATE-SEVERE PAIN

## 2021-02-05 ASSESSMENT — FIBROSIS 4 INDEX: FIB4 SCORE: 1.093188788999897185

## 2021-02-05 NOTE — PROGRESS NOTES
Subjective:      Richard Hubbard II is a 45 y.o. male who presents with Fall (fell in the snow last week, R hip px, lower back px)    HPI:  This is a new problem. Richard Hubbard II is a 45 y.o. male who presents today for evaluation after a fall.  Patient self reports history of osteoporosis.  He also has history of rheumatoid arthritis, chronic right knee pain status post total knee replacement and issues with a pathological fracture of his right foot.  He has been seen multiple times in the urgent care in the past after falling.  Most recent time he fell 1 week ago after slipping on the ice.  States that both of his legs not from underneath him and he twisted a little bit to the right side.  He says that his right buttock/hip took most of the impact.  He has been icing it but states that he still has significant pain and mildly decreased range of motion.  He states that with his osteoporosis he would like to get an x-ray.  Patient takes gabapentin, morphine, and Lyrica daily.  He also supplements with Tylenol as needed.  He stated these medications are not controlling his pain.  He denies any bowel/bladder incontinence, focal weakness, saddle anesthesia, or distal numbness/tingling.  States that he did not hit his head when he fell.      Review of Systems   Constitutional: Negative for chills and fever.   HENT: Negative for sore throat.    Eyes: Negative for blurred vision.   Respiratory: Negative for shortness of breath.    Cardiovascular: Negative for chest pain and palpitations.   Gastrointestinal: Negative for nausea and vomiting.   Musculoskeletal: Positive for falls and joint pain (Right hip).   Neurological: Negative for tingling and sensory change.       PMH:  has a past medical history of ADD (attention deficit disorder), Alcohol abuse, Allergic rhinitis (5/21/2009), Anemia (09/2019), Anxiety (5/21/2009), Apnea, sleep, Arrhythmia, Back pain (5/21/2009), Bipolar disorder (HCC), Bleeding ulcer  (5/21/2009), Bleeding ulcer (5/21/2009), Daytime sleepiness, Depression (5/21/2009), Eczema (5/21/2009), GERD (gastroesophageal reflux disease), Heart murmur, Hemorrhagic disorder ([facility]), History of bleeding ulcers (2/12/2015), Insomnia (5/21/2009), Migraine (5/21/2009), Morning headache, Obesity, morbid ([facility]) (5/21/2009), Osteomyelitis ([facility]) (10/7/2019), Pain (09/2019), Pubic ramus fracture ([facility]) (9/28/2019), Rheumatoid arteritis ([facility]) (09/2019), Sleep apnea (5/21/2009), and Snoring. He also has no past medical history of Hyperlipidemia.  MEDS:   Current Outpatient Medications:   •  gabapentin (NEURONTIN) 300 MG Cap, Take 1 Cap by mouth 3 times a day., Disp: 90 Cap, Rfl: 1  •  morphine (MS IR) 15 MG tablet, Take 1 Tab by mouth every 6 hours as needed for Severe Pain for up to 30 days., Disp: 120 Tab, Rfl: 0  •  XIIDRA 5 % Solution, , Disp: , Rfl:   •  pregabalin (LYRICA) 150 MG Cap, Take 1 Cap by mouth 3 times a day for 90 days., Disp: 90 Cap, Rfl: 2  •  zolpidem (AMBIEN) 5 MG Tab, Take 1 Tab by mouth at bedtime as needed for up to 30 days., Disp: 30 Tab, Rfl: 2  •  KEVZARA 200 MG/1.14ML Solution Auto-injector, , Disp: , Rfl:   •  tobramycin-dexamethasone (TOBRADEX) 0.3-0.1 % Suspension, , Disp: , Rfl:   •  losartan (COZAAR) 25 MG Tab, Take 1 Tab by mouth every day., Disp: 90 Tab, Rfl: 3  •  DULoxetine (CYMBALTA) 30 MG Cap DR Particles, Take 1 Cap by mouth every day., Disp: 30 Cap, Rfl: 2  •  omeprazole (PRILOSEC) 40 MG delayed-release capsule, Take 1 Cap by mouth every day., Disp: 30 Cap, Rfl: 6  •  betamethasone dipropionate 0.05 % Cream, Apply 1 g to affected area(s) 2 times a day., Disp: 45 g, Rfl: 0  •  ascorbic acid (VITAMIN C) 500 MG tablet, Take 1 Tab by mouth 2 times a day, with meals., Disp: 60 Tab, Rfl: 0  •  aspirin 81 MG EC tablet, Take 1 Tab by mouth every day., Disp: 90 Tab, Rfl: 2  •  ferrous sulfate 325 (65 Fe) MG tablet, Take 1 Tab by mouth 2 times a day, with meals., Disp: 60 Tab, Rfl: 0  •   "vitamin D 2000 UNIT Tab, Take 1 Tab by mouth every day., Disp: 60 Tab, Rfl:   •  docusate sodium 100 MG Cap, Take 100 mg by mouth 2 Times a Day., Disp: 60 Cap, Rfl:   •  nicotine (NICODERM) 7 MG/24HR PATCH 24 HR, Apply 1 Patch to skin as directed every 24 hours. (Patient not taking: Reported on 10/14/2020), Disp: 30 Patch, Rfl: 3  •  acetaminophen (TYLENOL) 500 MG Tab, Take 500-1,000 mg by mouth every 6 hours as needed., Disp: , Rfl:   •  Naloxone (NARCAN) 4 MG/0.1ML Liquid, One spray in one nostril for overdose and call 911. (Patient taking differently: Spray 1 Spray in nose as needed. One spray in one nostril for overdose and call 911.), Disp: 1 Package, Rfl: 0  ALLERGIES:   Allergies   Allergen Reactions   • Benadryl [Altaryl]      \"I get agitated\"   • Nsaids      Bleeding, \"I had a bleeding ulcer\"   • Phenergan [Promethazine Hcl]      \"I get very agitated\"   • Penicillins      \"Had some dizziness\"  Tolerated Unasyn 10/2019     SURGHX:   Past Surgical History:   Procedure Laterality Date   • PB TOTAL KNEE ARTHROPLASTY Right 7/22/2020    Procedure: ARTHROPLASTY, KNEE, TOTAL;  Surgeon: Temo Pires M.D.;  Location: [facility];  Service: Orthopedics   • TENDON TRANSFER Right 1/22/2020    Procedure: RIGHT DISTAL ULNA RESECTION AND EXTENSOR TENDON TRANSFER;  Surgeon: Marine Rushing M.D.;  Location: [facility];  Service: Orthopedics   • IRRIGATION & DEBRIDEMENT ORTHO Left 10/9/2019    Procedure: IRRIGATION AND DEBRIDEMENT, WOUND - PELVIC OSTEOMYELITIS;  Surgeon: You Olivares M.D.;  Location: [facility];  Service: Orthopedics   • OTHER SURGICAL PROCEDURE  2019    osteomelitis of pelvis cleaned   • GASTRIC BYPASS LAPAROSCOPIC  2000    Lucila en y   • CHOLECYSTECTOMY  2000   • OTHER SURGICAL PROCEDURE      repair of broken penis     SOCHX:  reports that he has been smoking cigarettes and cigars. He has a 0.75 pack-year smoking history. He has never used smokeless " "tobacco. He reports that he does not drink alcohol or use drugs.  FH: Family history was reviewed, no pertinent findings to report     Objective:     /84   Pulse 77   Temp 36.7 °C (98 °F) (Temporal)   Resp 20   Ht 1.803 m (5' 11\")   Wt 99.8 kg (220 lb)   SpO2 97%   BMI 30.68 kg/m²      Physical Exam  Constitutional:       Appearance: He is well-developed.   HENT:      Head: Normocephalic and atraumatic.      Right Ear: External ear normal.      Left Ear: External ear normal.   Eyes:      Conjunctiva/sclera: Conjunctivae normal.      Pupils: Pupils are equal, round, and reactive to light.   Cardiovascular:      Rate and Rhythm: Normal rate and regular rhythm.      Heart sounds: Normal heart sounds. No murmur.   Pulmonary:      Effort: Pulmonary effort is normal.      Breath sounds: Normal breath sounds. No wheezing.   Musculoskeletal:      Right hip: He exhibits tenderness and swelling. He exhibits normal range of motion and normal strength.      Comments: Right posterior buttocks/upper thigh exhibit soft tissue swelling with tenderness and mild hematoma formation.  The ecchymosis extends all the way to his popliteal fossa with the thigh itself is nontender.  No tenderness over the greater trochanter.  Patient does note some pain in his buttocks/lateral hip region with internal rotation of the right hip.  Patient ambulates with cane at baseline, so unable to determine if his gait is more antalgic or not.   Skin:     General: Skin is warm and dry.      Capillary Refill: Capillary refill takes less than 2 seconds.   Neurological:      Mental Status: He is alert and oriented to person, place, and time.   Psychiatric:         Behavior: Behavior normal.         Judgment: Judgment normal.       DX-HIP-COMPLETE - UNILATERAL 2+ RIGHT   FINDINGS:  There is no evidence of acute fracture involving the pelvis. No proximal femoral fracture is identified.  There is no evidence of femoral head flattening. No femoral " head deformity is identified. No bone erosions are appreciated.  There is mild hip joint space narrowing.     IMPRESSION:     1.  No radiographic evidence of acute traumatic injury.     2.  Findings are consistent with mild osteoarthritis.  Assessment/Plan:     1. Hip injury, right, initial encounter  - DX-HIP-COMPLETE - UNILATERAL 2+ RIGHT; Future    2. Fall, initial encounter  - DX-HIP-COMPLETE - UNILATERAL 2+ RIGHT; Future    3. Contusion of right hip, initial encounter  -X-ray negative for any acute fracture or dislocation.  Symptoms and physical exam findings are consistent with contusion of the right hip with probable hematoma formation.  Patient was advised to start applying warm compresses to the affected area and do gentle range of motion exercises as discussed.  Return if symptoms worsen or fail to improve.

## 2021-02-14 NOTE — DISCHARGE INSTRUCTIONS
Chief Complaint: _23 y/o G1 @ 40.0 EGA  in labor, GBS negative, admitted  History of Present Illness: _Adequate care at [facility], HIV negative, RPR negative  Past Medical History: _obesity, anemia, armpit boil and   Past Surgical History: _none  Family History: _none  Social History: _denies tabacco, alcohol and drugs  Allergies: _none  Medications: _ Medication Reconciliation Form Reviewed  Review of Systems: as mentioned    FHT category 1  COntracions Q 5 mins  Constitutional: _as mentioned above      Physical Examination:   Constitutional: _ , vital signs reviewed  Eyes: _nl  ENT: _nl  Neck: _nl  Respiratory: _CTAB  Cardiovascular: _S1S2, RRR  Chest: _nl  Gastrointestinal: _gravid  Genitourinary: _1.5 cm, 90% effaced  Lymph: _nl  Musculoskeletal: _ no c/c/e, calf NT  Skin: _nl  Neurologic: _A/A/O X3  Psychiatric: _nl    Labs: _Labs (Last four charted values)  WBC                  9.0 (AUG 26)   Hgb                  L 10.5 (AUG 26)   Hct                  35 (AUG 26)   Plt                  159 (AUG 26)     Radiology: _n/a    Assessment: _23 y/o G1 @ 40 weeks in Early labor, admitted, GBS negative    Plan: _Anticipate vaginal delivery           Electronically Signed On 08.27.2019 07:37  ___________________________________________________   Leah Lockhart MD, Rona NELSON     At this time we do not believe you have a cellulitis as discussed with orthopedic surgery do not take antibiotics that were prescribed.  Return emergency department for any new or worsening issues such as changes in the skin, swelling of the skin, fevers chills weakness numbness tingling or worsening fatigue.  Blood cultures were taken today and if anything becomes positive you will receive a call back.

## 2021-02-23 DIAGNOSIS — G89.4 CHRONIC PAIN DISORDER: ICD-10-CM

## 2021-02-23 RX ORDER — MORPHINE SULFATE 15 MG/1
15 TABLET ORAL EVERY 6 HOURS PRN
Qty: 120 TABLET | Refills: 0 | Status: SHIPPED | OUTPATIENT
Start: 2021-02-23 | End: 2021-03-22 | Stop reason: SDUPTHER

## 2021-02-23 NOTE — TELEPHONE ENCOUNTER
Received request via: Patient    Was the patient seen in the last year in this department? Yes    Does the patient have an active prescription (recently filled or refills available) for medication(s) requested? No     Requested Prescriptions     Pending Prescriptions Disp Refills   • morphine (MS IR) 15 MG tablet 120 tablet 0     Sig: Take 1 tablet by mouth every 6 hours as needed for Severe Pain for up to 30 days.

## 2021-03-12 ENCOUNTER — HOSPITAL ENCOUNTER (OUTPATIENT)
Dept: RADIOLOGY | Facility: MEDICAL CENTER | Age: 46
End: 2021-03-12
Attending: NURSE PRACTITIONER
Payer: COMMERCIAL

## 2021-03-12 ENCOUNTER — OFFICE VISIT (OUTPATIENT)
Dept: MEDICAL GROUP | Facility: MEDICAL CENTER | Age: 46
End: 2021-03-12
Payer: COMMERCIAL

## 2021-03-12 VITALS
TEMPERATURE: 98.3 F | HEART RATE: 73 BPM | WEIGHT: 264.33 LBS | BODY MASS INDEX: 37.01 KG/M2 | SYSTOLIC BLOOD PRESSURE: 118 MMHG | DIASTOLIC BLOOD PRESSURE: 70 MMHG | OXYGEN SATURATION: 94 % | HEIGHT: 71 IN

## 2021-03-12 DIAGNOSIS — N50.89 TESTICULAR SWELLING, RIGHT: ICD-10-CM

## 2021-03-12 DIAGNOSIS — M25.559 HIP PAIN: ICD-10-CM

## 2021-03-12 DIAGNOSIS — F33.2 SEVERE EPISODE OF RECURRENT MAJOR DEPRESSIVE DISORDER, WITHOUT PSYCHOTIC FEATURES (HCC): ICD-10-CM

## 2021-03-12 DIAGNOSIS — R07.9 CHEST PAIN, UNSPECIFIED TYPE: ICD-10-CM

## 2021-03-12 DIAGNOSIS — M21.6X2 PRONATION DEFORMITY OF BOTH FEET: ICD-10-CM

## 2021-03-12 DIAGNOSIS — M21.6X1 PRONATION DEFORMITY OF BOTH FEET: ICD-10-CM
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
## 2021-03-12 PROCEDURE — 71046 X-RAY EXAM CHEST 2 VIEWS: CPT

## 2021-03-12 PROCEDURE — 99213 OFFICE O/P EST LOW 20 MIN: CPT | Mod: 95,CR | Performed by: NURSE PRACTITIONER

## 2021-03-12 PROCEDURE — 73502 X-RAY EXAM HIP UNI 2-3 VIEWS: CPT | Mod: LT

## 2021-03-12 RX ORDER — ZOLPIDEM TARTRATE 5 MG/1
TABLET ORAL
COMMUNITY
Start: 2021-03-02 | End: 2022-07-27

## 2021-03-12 RX ORDER — NYSTATIN 100000 [USP'U]/G
1 POWDER TOPICAL 4 TIMES DAILY
Qty: 30 G | Refills: 6 | Status: SHIPPED | OUTPATIENT
Start: 2021-03-12 | End: 2021-11-30

## 2021-03-12 ASSESSMENT — PATIENT HEALTH QUESTIONNAIRE - PHQ9
SUM OF ALL RESPONSES TO PHQ QUESTIONS 1-9: 21
CLINICAL INTERPRETATION OF PHQ2 SCORE: 6
5. POOR APPETITE OR OVEREATING: 3 - NEARLY EVERY DAY

## 2021-03-12 ASSESSMENT — FIBROSIS 4 INDEX: FIB4 SCORE: 1.093188788999897185

## 2021-03-12 NOTE — PROGRESS NOTES
Chief Complaint   Patient presents with   • Follow-Up     fell on 3/12/21. Pain left chest, and leg.   • Depression     PHQ. 9- 21       Subjective:     HPI:     Richard Hubbard II is a 45 y.o. male here to discuss the evaluation and management of:      Had a fall two days ago while changing a tire in the ice. His ankles and knees are so bad that he is very unstable and had fallen.  Has chronic bilateral lower extremity edema that can fluctuate.  His feet get very swollen sometimes it is difficult for him to have full sensation.  He does have a wheelchair at home as needed.  He is using his walker today.  Has some bruising on his left lateral hip.  He fell on his left side and is complaining of chest wall pain.    When he breaths or cough it will cause increased pain.    Is also complaining of having testicle swelling.  There is no pain or palpable mass on examination.  Very mild.  Has suffered from chronic swelling in his lower extremities and trunk.    He also mentions he has not followed up with counseling at this time.  His depression is not controlled.  He is quite emotional in the clinic today as he does have feelings of inadequacy.  He feels bad that he is so medically complicated especially for his wife and kids.  Hates feeling this way.  Strongly encourage patient to follow-up with counseling.  Referral has already been placed.    ROS:  Denies any Headache, Blurred Vision, Confusion, Chest pain,  Shortness of breath,  Abdominal pain, Changes of bowel or bladder, Lower ext edema, Fevers, Nights sweats, Weight Changes, Focal weakness or numbness.  And all other systems reviewed and are all negative. POSITIVE FOR see above        Current Outpatient Medications:   •  zolpidem (AMBIEN) 5 MG Tab, , Disp: , Rfl:   •  nystatin (MYCOSTATIN) powder, Apply 1 g topically 4 times a day. To affected areas, Disp: 30 g, Rfl: 6  •  miconazole (MICOTIN) 2 % Cream, Apply 1 g topically 2 Times a Day., Disp: 60 g, Rfl:  1  •  gabapentin (NEURONTIN) 300 MG Cap, Take 1 Cap by mouth 3 times a day., Disp: 90 Cap, Rfl: 1  •  XIIDRA 5 % Solution, , Disp: , Rfl:   •  KEVZARA 200 MG/1.14ML Solution Auto-injector, , Disp: , Rfl:   •  tobramycin-dexamethasone (TOBRADEX) 0.3-0.1 % Suspension, , Disp: , Rfl:   •  DULoxetine (CYMBALTA) 30 MG Cap DR Particles, Take 1 Cap by mouth every day., Disp: 30 Cap, Rfl: 2  •  omeprazole (PRILOSEC) 40 MG delayed-release capsule, Take 1 Cap by mouth every day., Disp: 30 Cap, Rfl: 6  •  betamethasone dipropionate 0.05 % Cream, Apply 1 g to affected area(s) 2 times a day., Disp: 45 g, Rfl: 0  •  ascorbic acid (VITAMIN C) 500 MG tablet, Take 1 Tab by mouth 2 times a day, with meals., Disp: 60 Tab, Rfl: 0  •  aspirin 81 MG EC tablet, Take 1 Tab by mouth every day., Disp: 90 Tab, Rfl: 2  •  ferrous sulfate 325 (65 Fe) MG tablet, Take 1 Tab by mouth 2 times a day, with meals., Disp: 60 Tab, Rfl: 0  •  vitamin D 2000 UNIT Tab, Take 1 Tab by mouth every day., Disp: 60 Tab, Rfl:   •  docusate sodium 100 MG Cap, Take 100 mg by mouth 2 Times a Day., Disp: 60 Cap, Rfl:   •  acetaminophen (TYLENOL) 500 MG Tab, Take 500-1,000 mg by mouth every 6 hours as needed., Disp: , Rfl:   •  Naloxone (NARCAN) 4 MG/0.1ML Liquid, One spray in one nostril for overdose and call 911. (Patient taking differently: Spray 1 Spray in nose as needed. One spray in one nostril for overdose and call 911.), Disp: 1 Package, Rfl: 0  •  pregabalin (LYRICA) 150 MG Cap, Take 1 capsule by mouth 3 times a day for 90 days., Disp: 90 capsule, Rfl: 2  •  furosemide (LASIX) 20 MG Tab, Take 1 tablet by mouth every day., Disp: 10 tablet, Rfl: 0  •  cyclobenzaprine (FLEXERIL) 5 mg tablet, Take 1 tablet by mouth 2 times a day as needed for up to 15 days., Disp: 30 tablet, Rfl: 0  •  morphine (MS IR) 15 MG tablet, Take 1 tablet by mouth every 6 hours as needed for Severe Pain for up to 30 days., Disp: 120 tablet, Rfl: 0  •  losartan (COZAAR) 25 MG Tab, Take 1  "Tab by mouth every day., Disp: 90 Tab, Rfl: 3  •  nicotine (NICODERM) 7 MG/24HR PATCH 24 HR, Apply 1 Patch to skin as directed every 24 hours., Disp: 30 Patch, Rfl: 3    Allergies   Allergen Reactions   • Benadryl [Altaryl]      \"I get agitated\"   • Nsaids      Bleeding, \"I had a bleeding ulcer\"   • Phenergan [Promethazine Hcl]      \"I get very agitated\"   • Penicillins      \"Had some dizziness\"  Tolerated Unasyn 10/2019       Past Medical History:   Diagnosis Date   • ADD (attention deficit disorder)    • Alcohol abuse    • Allergic rhinitis 5/21/2009   • Anemia 09/2019   • Anxiety 5/21/2009   • Apnea, sleep    • Arrhythmia     afib when hospitaized for infection   • Back pain 5/21/2009   • Bipolar disorder (HCC)    • Bleeding ulcer 5/21/2009   • Bleeding ulcer 5/21/2009    History of anemia-now on nexium    • Daytime sleepiness    • Depression 5/21/2009   • Eczema 5/21/2009   • GERD (gastroesophageal reflux disease)    • Heart murmur     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   • Hemorrhagic disorder (HCC)     hx of bleeding ulcers   • History of bleeding ulcers 2/12/2015   • Insomnia 5/21/2009   • Migraine 5/21/2009   • Morning headache    • Obesity, morbid ([facility]) 5/21/2009   • Osteomyelitis ([facility]) 10/7/2019   • Pain 09/2019    Chronic knee and back pain   • Pubic ramus fracture ([facility]) 9/28/2019   • Rheumatoid arteritis ([facility]) 09/2019    knee, feet   • Sleep apnea 5/21/2009    Did not tolerate cpap, does not use it   • Snoring      Past Surgical History:   Procedure Laterality Date   • PB TOTAL KNEE ARTHROPLASTY Right 7/22/2020    Procedure: ARTHROPLASTY, KNEE, TOTAL;  Surgeon: Temo Pires M.D.;  Location: SURGERY [facility];  Service: Orthopedics   • TENDON TRANSFER Right 1/22/2020    Procedure: RIGHT DISTAL ULNA RESECTION AND EXTENSOR TENDON TRANSFER;  Surgeon: Marine Rushing M.D.;  Location: SURGERY [facility];  Service: Orthopedics   • IRRIGATION & DEBRIDEMENT ORTHO Left 10/9/2019    Procedure: " IRRIGATION AND DEBRIDEMENT, WOUND - PELVIC OSTEOMYELITIS;  Surgeon: You Olivares M.D.;  Location: SURGERY [facility];  Service: Orthopedics   • OTHER SURGICAL PROCEDURE      osteomelitis of pelvis cleaned   • GASTRIC BYPASS LAPAROSCOPIC      Lucila en y   • CHOLECYSTECTOMY     • OTHER SURGICAL PROCEDURE      repair of broken penis     Family History   Problem Relation Age of Onset   • Hypertension Mother    • Arthritis Mother    • Depression Mother    • Cancer Father         bladder   • Schizophrenia Father    • Cancer Maternal Grandmother         breast   • Heart Disease Maternal Grandmother    • Psychiatric Illness Other    • Stroke Maternal Aunt    • Other Neg Hx         no connective tissue disorders or known aortic disease     Social History     Socioeconomic History   • Marital status:      Spouse name: Not on file   • Number of children: Not on file   • Years of education: Not on file   • Highest education level: Not on file   Occupational History   • 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   Tobacco Use   • Smoking status: Current Every Day Smoker     Packs/day: 0.25     Years: 3.00     Pack years: 0.75     Types: Cigarettes, Cigars     Last attempt to quit: 2019     Years since quittin.5   • Smokeless tobacco: Never Used   • Tobacco comment: occasional cigar   Substance and Sexual Activity   • Alcohol use: No     Alcohol/week: 0.0 oz     Comment: quit 2.5 yrs ago- past ETOH abuse   • Drug use: No   • Sexual activity: Yes     Partners: Female     Comment: ;    Other Topics Concern   •  Service No   • Blood Transfusions Yes   • Caffeine Concern No   • Occupational Exposure No   • Hobby Hazards No   • Sleep Concern No   • Stress Concern Yes   • Weight Concern Yes   • Special Diet No   • Back Care No   • Exercise No   • Bike Helmet No   • Seat Belt Yes   • Self-Exams Yes   Social History Narrative    , lives in [place]     Social Determinants of Health  "    Financial Resource Strain:    • Difficulty of Paying Living Expenses:    Food Insecurity:    • Worried About Running Out of Food in the Last Year:    • Ran Out of Food in the Last Year:    Transportation Needs:    • Lack of Transportation (Medical):    • Lack of Transportation (Non-Medical):    Physical Activity:    • Days of Exercise per Week:    • Minutes of Exercise per Session:    Stress:    • Feeling of Stress :    Social Connections:    • Frequency of Communication with Friends and Family:    • Frequency of Social Gatherings with Friends and Family:    • Attends Judaism Services:    • Active Member of Clubs or Organizations:    • Attends Club or Organization Meetings:    • Marital Status:    Intimate Partner Violence:    • Fear of Current or Ex-Partner:    • Emotionally Abused:    • Physically Abused:    • Sexually Abused:        Objective:     Vitals: /70 (BP Location: Left arm, Patient Position: Sitting, BP Cuff Size: Large adult)   Pulse 73   Temp 36.8 °C (98.3 °F) (Temporal)   Ht 1.803 m (5' 11\")   Wt 120 kg (264 lb 5.3 oz)   SpO2 94%   BMI 36.87 kg/m²    General: Alert, pleasant, NAD  HEENT: Normocephalic.   Respiratory: Normal respiratory effort.  Clear to auscultation bilaterally. No wheezing  Skin: Warm, dry, no rashes.pain with palpation on left lateral rib cage. Large bruising in left lateral hip.  Extremities: No leg edema. No discoloration  Neurological: No tremors  Psych:  Affect/mood is normal, judgement is good, memory is intact, grooming is appropriate.    Assessment/Plan:      was seen today for follow-up and depression.    Diagnoses and all orders for this visit:    Fall, initial encounter  Patient is a very high risk for fall.  Multiple falls.  He needs to be using his walker daily.  -     DX-CHEST-2 VIEWS; Future  -      DX-HIP-UNILATERAL-WITH PELVIS-1 VIEW LEFT; Future    Hip pain  S/p recent fall. Large ecchymotic area on left lateral hip.  Patient does have " osteoporosis and history of multiple fractures, osteomyelitis.  He is immunosuppressed at this time.  X-rays ordered.  -     : DX-HIP-UNILATERAL-WITH PELVIS-1 VIEW LEFT; Future    Chest pain, unspecified type  S/p recent fall. Non cardiac related.  Check x-ray for potential rib fracture.  Low suspicion for pneumothorax at this time.   -     DX-CHEST-2 VIEWS; Future    Testicular swelling, right  Ongoing problem for the patient.  At this time it is very mild.  No palpable mass.  No nodule.  Check ultrasound rule out hydrocele.  May use nystatin powder help reduce moisture elation.  -     [id number]; Future  -     nystatin (MYCOSTATIN) powder; Apply 1 g topically 4 times a day. To affected areas    Pronation deformity of both feet  Secondary to RA.  Caution regarding high fall risk.  Recommend using a walker at all times.    Severe episode of recurrent major depressive disorder, without psychotic features (HCC)  Not well controlled at this time.  Patient needs intensive counseling.  Have discussed possibly checking himself into the outpatient treatment program.  Patient states he will think about this.          No follow-ups on file.          Brit JOSHUA.

## 2021-03-18 NOTE — PROGRESS NOTES
Received report from Day RN and assumed care of patient.  Patient reported pain under control.  Patient ate dinner and then vomited 800ml of undigested food.  Physician notified and new orders received.   IV patent and infusing.  Pelvic dressing CDI.  Hard lump noted under dressing.  Patient reported the lump has been there a couple of days and that physician is aware.  Plan of care reviewed and questions answered.  Hourly rounding in place.     MADELAINE spoke with Jarvis,  at [facility], [phone]. Jarvis confirmed patient is known to their center. Jarvis reports patient can return to receive her dialysis treatment.  Patient with [employer]. Patient does not meet medical criteria for an ambulance.  MADELAINE met with patient at bedside re above. Patient in agreement with returning to dialysis center for treatment. Patient reports she does not have the means to pay for a taxi. MADELAINE scheduled taxi through [employer], [phone] on [id number] due to time sensitivity, booking ID# [id number].

## 2021-03-22 DIAGNOSIS — G89.4 CHRONIC PAIN DISORDER: ICD-10-CM

## 2021-03-22 RX ORDER — MORPHINE SULFATE 15 MG/1
15 TABLET ORAL EVERY 6 HOURS PRN
Qty: 120 TABLET | Refills: 0 | Status: SHIPPED | OUTPATIENT
Start: 2021-03-22 | End: 2021-04-22

## 2021-03-23 ENCOUNTER — TELEMEDICINE (OUTPATIENT)
Dept: MEDICAL GROUP | Facility: MEDICAL CENTER | Age: 46
End: 2021-03-23
Payer: COMMERCIAL

## 2021-03-23 VITALS — BODY MASS INDEX: 39.62 KG/M2 | HEIGHT: 71 IN | WEIGHT: 283 LBS

## 2021-03-23 DIAGNOSIS — R60.0 LOCALIZED EDEMA: ICD-10-CM

## 2021-03-23 PROCEDURE — 99213 OFFICE O/P EST LOW 20 MIN: CPT | Mod: 95,CR | Performed by: NURSE PRACTITIONER

## 2021-03-23 RX ORDER — FUROSEMIDE 20 MG/1
20 TABLET ORAL DAILY
Qty: 10 TABLET | Refills: 0 | Status: SHIPPED | OUTPATIENT
Start: 2021-03-23 | End: 2021-03-31 | Stop reason: SDUPTHER

## 2021-03-23 ASSESSMENT — FIBROSIS 4 INDEX: FIB4 SCORE: 1.093188788999897185

## 2021-03-23 NOTE — PROGRESS NOTES
"Telemedicine Video Visit: Established Patient   This Remote Face to Face encounter was conducted via Zoom. Given the importance of social distancing and other strategies recommended to reduce the risk of COVID-19 transmission, I am providing medical care to this patient via audio/video visit in place of an in person visit at the request of the patient. Verbal consent to telehealth, risks, benefits, and consequences were discussed. Patient retains the right to withdraw at any time. All existing confidentiality protections apply. The patient has access to all transmitted medical information. No dissemination of any patient images or information to other entities without further written consent.  Subjective:     Chief Complaint   Patient presents with   • Edema     left leg swelling, causing bruising       Richard Hubbard II is a 45 y.o. male presenting for evaluation and management of:    Presents with complaints of swelling-states it is in his abdomen and lower extremities.  States his left leg is worse than his right leg.  States it started about 1 week ago.  Feels like he is retaining fluid in his scrotum as well.  Reports having some light bruising in this particular area due to the edema.  He is requesting to have some blood work done to make sure he does not have osteomyelitis as he is concerned about this based on his history.  No fevers.  No fall in the last 7 days.  He also mentions that Lasix \"did nothing in the past\"  Last K+ 4.7 in January.    ROS see above  Denies any recent fevers or chills. No nausea or vomiting. No chest pains or shortness of breath.     Allergies   Allergen Reactions   • Benadryl [Altaryl]      \"I get agitated\"   • Nsaids      Bleeding, \"I had a bleeding ulcer\"   • Phenergan [Promethazine Hcl]      \"I get very agitated\"   • Penicillins      \"Had some dizziness\"  Tolerated Unasyn 10/2019       Current medicines (including changes today)  Current Outpatient Medications "   Medication Sig Dispense Refill   • furosemide (LASIX) 20 MG Tab Take 1 tablet by mouth every day. 10 tablet 0   • morphine (MS IR) 15 MG tablet Take 1 tablet by mouth every 6 hours as needed for Severe Pain for up to 30 days. 120 tablet 0   • zolpidem (AMBIEN) 5 MG Tab      • nystatin (MYCOSTATIN) powder Apply 1 g topically 4 times a day. To affected areas 30 g 6   • miconazole (MICOTIN) 2 % Cream Apply 1 g topically 2 Times a Day. 60 g 1   • gabapentin (NEURONTIN) 300 MG Cap Take 1 Cap by mouth 3 times a day. 90 Cap 1   • XIIDRA 5 % Solution      • pregabalin (LYRICA) 150 MG Cap Take 1 Cap by mouth 3 times a day for 90 days. 90 Cap 2   • KEVZARA 200 MG/1.14ML Solution Auto-injector      • tobramycin-dexamethasone (TOBRADEX) 0.3-0.1 % Suspension      • losartan (COZAAR) 25 MG Tab Take 1 Tab by mouth every day. 90 Tab 3   • DULoxetine (CYMBALTA) 30 MG Cap DR Particles Take 1 Cap by mouth every day. 30 Cap 2   • omeprazole (PRILOSEC) 40 MG delayed-release capsule Take 1 Cap by mouth every day. 30 Cap 6   • betamethasone dipropionate 0.05 % Cream Apply 1 g to affected area(s) 2 times a day. 45 g 0   • ascorbic acid (VITAMIN C) 500 MG tablet Take 1 Tab by mouth 2 times a day, with meals. 60 Tab 0   • aspirin 81 MG EC tablet Take 1 Tab by mouth every day. 90 Tab 2   • ferrous sulfate 325 (65 Fe) MG tablet Take 1 Tab by mouth 2 times a day, with meals. 60 Tab 0   • vitamin D 2000 UNIT Tab Take 1 Tab by mouth every day. 60 Tab    • docusate sodium 100 MG Cap Take 100 mg by mouth 2 Times a Day. 60 Cap    • nicotine (NICODERM) 7 MG/24HR PATCH 24 HR Apply 1 Patch to skin as directed every 24 hours. 30 Patch 3   • acetaminophen (TYLENOL) 500 MG Tab Take 500-1,000 mg by mouth every 6 hours as needed.     • Naloxone (NARCAN) 4 MG/0.1ML Liquid One spray in one nostril for overdose and call 911. (Patient taking differently: Spray 1 Spray in nose as needed. One spray in one nostril for overdose and call 911.) 1 Package 0   •  cyclobenzaprine (FLEXERIL) 5 mg tablet Take 1 tablet by mouth 2 times a day as needed for up to 15 days. 30 tablet 0     No current facility-administered medications for this visit.       Patient Active Problem List    Diagnosis Date Noted   • Paroxysmal atrial fibrillation ([facility]) 07/23/2020     Priority: Low   • Essential hypertension 09/13/2019     Priority: Low   • Rheumatoid arthritis ([facility]) 09/08/2015     Priority: Low   • Chronic pain of right knee 09/08/2015     Priority: Low   • Chronic use of opiate drug for therapeutic purpose 01/18/2021   • Other chronic pain 01/18/2021   • At high risk for falls 12/10/2020   • Localized osteoporosis without current pathological fracture 10/19/2020   • History of osteomyelitis 08/14/2020   • History of immunosuppressive therapy 08/14/2020   • S/P total knee arthroplasty, right 07/27/2020   • Cigarette nicotine dependence without complication 07/21/2020   • Aortic root dilatation ([facility]) 09/13/2019   • Aortic stenosis, moderate 09/12/2019   • MONICA (obstructive sleep apnea) 03/09/2010   • Mood disorder ([facility]) 05/21/2009   • Anxiety disorder 05/21/2009       Family History   Problem Relation Age of Onset   • Hypertension Mother    • Arthritis Mother    • Depression Mother    • Cancer Father         bladder   • Schizophrenia Father    • Cancer Maternal Grandmother         breast   • Heart Disease Maternal Grandmother    • Psychiatric Illness Other    • Stroke Maternal Aunt    • Other Neg Hx         no connective tissue disorders or known aortic disease       He  has a past medical history of ADD (attention deficit disorder), Alcohol abuse, Allergic rhinitis (5/21/2009), Anemia (09/2019), Anxiety (5/21/2009), Apnea, sleep, Arrhythmia, Back pain (5/21/2009), Bipolar disorder ([facility]), Bleeding ulcer (5/21/2009), Bleeding ulcer (5/21/2009), Daytime sleepiness, Depression (5/21/2009), Eczema (5/21/2009), GERD (gastroesophageal reflux disease), Heart murmur, Hemorrhagic disorder ([facility]),  "History of bleeding ulcers (2/12/2015), Insomnia (5/21/2009), Migraine (5/21/2009), Morning headache, Obesity, morbid (HCC) (5/21/2009), Osteomyelitis (HCC) (10/7/2019), Pain (09/2019), Pubic ramus fracture (HCC) (9/28/2019), Rheumatoid arteritis (HCC) (09/2019), Sleep apnea (5/21/2009), and Snoring. He also has no past medical history of Hyperlipidemia.  He  has a past surgical history that includes gastric bypass laparoscopic (2000); cholecystectomy (2000); irrigation & debridement ortho (Left, 10/9/2019); tendon transfer (Right, 1/22/2020); other surgical procedure; other surgical procedure (2019); and pr total knee arthroplasty (Right, 7/22/2020).       Objective:   Vitals obtained by patient:  Ht 1.803 m (5' 11\")   Wt (!) 128 kg (283 lb)   BMI 39.47 kg/m²     Physical Exam:  Constitutional: Alert, no distress, well-groomed.  Skin: No rashes in visible areas.  Eye: Round. Conjunctiva clear, lids normal. No icterus.   ENMT: Lips pink without lesions, good dentition, moist mucous membranes. Phonation normal.  Neck: No masses, no thyromegaly. Moves freely without pain.  CV: Pulse as reported by patient  Respiratory: Unlabored respiratory effort, no cough or audible wheeze  Psych: Alert and oriented x3, normal affect and mood.       Assessment and Plan:   The following treatment plan was discussed:     1. Localized edema  Intermittent problem for the patient.  Will send a short course of furosemide.  Check labs and follow-up after.  Follow-up symptoms persist or worsen.  Last potassium 4.7.  - Basic Metabolic Panel; Future  - CBC WITH DIFFERENTIAL; Future  - furosemide (LASIX) 20 MG Tab; Take 1 tablet by mouth every day.  Dispense: 10 tablet; Refill: 0        Follow-up: No follow-ups on file.    Face to Face Video Visit:     MARTÍN Turner"

## 2021-03-24 RX ORDER — CYCLOBENZAPRINE HCL 5 MG
5 TABLET ORAL 2 TIMES DAILY PRN
Qty: 30 TABLET | Refills: 0 | Status: SHIPPED | OUTPATIENT
Start: 2021-03-24 | End: 2021-04-07 | Stop reason: SDUPTHER

## 2021-03-25 ENCOUNTER — HOSPITAL ENCOUNTER (OUTPATIENT)
Dept: BEHAVIORAL HEALTH | Facility: MEDICAL CENTER | Age: 46
End: 2021-03-25
Attending: FAMILY MEDICINE
Payer: COMMERCIAL

## 2021-03-25 VITALS
RESPIRATION RATE: 18 BRPM | WEIGHT: 283 LBS | BODY MASS INDEX: 39.62 KG/M2 | HEART RATE: 92 BPM | TEMPERATURE: 97.8 F | DIASTOLIC BLOOD PRESSURE: 76 MMHG | HEIGHT: 71 IN | SYSTOLIC BLOOD PRESSURE: 134 MMHG

## 2021-03-25 DIAGNOSIS — M06.9 RHEUMATOID ARTHRITIS INVOLVING MULTIPLE SITES, UNSPECIFIED WHETHER RHEUMATOID FACTOR PRESENT (HCC): Chronic | ICD-10-CM

## 2021-03-25 DIAGNOSIS — G89.4 CHRONIC PAIN DISORDER: ICD-10-CM

## 2021-03-25 DIAGNOSIS — Z79.891 CHRONIC USE OF OPIATE DRUG FOR THERAPEUTIC PURPOSE: ICD-10-CM

## 2021-03-25 DIAGNOSIS — R60.0 PEDAL EDEMA: ICD-10-CM

## 2021-03-25 PROCEDURE — 99214 OFFICE O/P EST MOD 30 MIN: CPT | Performed by: FAMILY MEDICINE

## 2021-03-25 ASSESSMENT — ENCOUNTER SYMPTOMS
RESPIRATORY NEGATIVE: 1
BRUISES/BLEEDS EASILY: 0
HEMOPTYSIS: 0
PALPITATIONS: 0
HEADACHES: 0
BACK PAIN: 1
BLURRED VISION: 0
NEUROLOGICAL NEGATIVE: 1
PSYCHIATRIC NEGATIVE: 1
MYALGIAS: 0
CHILLS: 0
DOUBLE VISION: 0
FEVER: 0
CONSTITUTIONAL NEGATIVE: 1
TINGLING: 0
GASTROINTESTINAL NEGATIVE: 1
HEARTBURN: 0
DIZZINESS: 0
DEPRESSION: 0
EYES NEGATIVE: 1
NAUSEA: 0
COUGH: 0

## 2021-03-25 ASSESSMENT — FIBROSIS 4 INDEX: FIB4 SCORE: 1.093188788999897185

## 2021-03-25 NOTE — PROGRESS NOTES
Subjective:      Richard Hubbard II is a 45 y.o. male who presents with Medication Management            1. Chronic pain disorder  Patient is doing a slow taper off of opioids. Has been on qid morphine with last refill this week, for his next refill we will go [place] to [place].  He did unfortunately have a fall this past month with some increase in pain in his left side but no fx seen on films    2. Chronic use of opiate drug for therapeutic purpose      3. Rheumatoid arthritis involving multiple sites, unspecified whether rheumatoid factor present ([facility])  Seeing rheum    4. Pedal edema  Improved with lasix per pcp    Past Medical History:  No date: ADD (attention deficit disorder)  No date: Alcohol abuse  5/21/2009: Allergic rhinitis  09/2019: Anemia  5/21/2009: Anxiety  No date: Apnea, sleep  No date: Arrhythmia      Comment:  afib when hospitaized for infection  5/21/2009: Back pain  No date: Bipolar disorder ([facility])  5/21/2009: Bleeding ulcer  5/21/2009: Bleeding ulcer      Comment:  History of anemia-now on nexium   No date: Daytime sleepiness  5/21/2009: Depression  5/21/2009: Eczema  No date: GERD (gastroesophageal reflux disease)  No date: Heart murmur      Comment:  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  No date: Hemorrhagic disorder ([facility])      Comment:  hx of bleeding ulcers  2/12/2015: History of bleeding ulcers  5/21/2009: Insomnia  5/21/2009: Migraine  No date: Morning headache  5/21/2009: Obesity, morbid ([facility])  10/7/2019: Osteomyelitis ([facility])  09/2019: Pain      Comment:  Chronic knee and back pain  9/28/2019: Pubic ramus fracture ([facility])  09/2019: Rheumatoid arteritis ([facility])      Comment:  knee, feet  5/21/2009: Sleep apnea      Comment:  Did not tolerate cpap, does not use it  No date: Snoring  Past Surgical History:  7/22/2020: PB TOTAL KNEE ARTHROPLASTY; Right      Comment:  Procedure: ARTHROPLASTY, KNEE, TOTAL;  Surgeon: Temo Pires M.D.;  Location: SURGERY [facility]  ORS;                 Service: Orthopedics  2020: TENDON TRANSFER; Right      Comment:  Procedure: RIGHT DISTAL ULNA RESECTION AND EXTENSOR                TENDON TRANSFER;  Surgeon: Marine Rushing M.D.;                 Location: SURGERY [facility] ORS;  Service: Orthopedics  10/9/2019: IRRIGATION & DEBRIDEMENT ORTHO; Left      Comment:  Procedure: IRRIGATION AND DEBRIDEMENT, WOUND - PELVIC                OSTEOMYELITIS;  Surgeon: You Olivares M.D.;                 Location: SURGERY [facility] ORS;  Service:                Orthopedics  2019: OTHER SURGICAL PROCEDURE      Comment:  osteomelitis of pelvis cleaned  2000: GASTRIC BYPASS LAPAROSCOPIC      Comment:  Lucila en y  2000: CHOLECYSTECTOMY  No date: OTHER SURGICAL PROCEDURE      Comment:  repair of broken penis  Social History    Tobacco Use      Smoking status: Current Every Day Smoker        Packs/day: 0.25        Years: 3.00        Pack years: .75        Types: Cigarettes, Cigars        Quit date: 2019        Years since quittin.4      Smokeless tobacco: Never Used      Tobacco comment: occasional cigar    Alcohol use: No      Alcohol/week: 0.0 oz      Comment: quit 2.5 yrs ago- past ETOH abuse    Drug use: No    Review of patient's family history indicates:  Problem: Hypertension      Relation: Mother          Age of Onset: (Not Specified)  Problem: Arthritis      Relation: Mother          Age of Onset: (Not Specified)  Problem: Depression      Relation: Mother          Age of Onset: (Not Specified)  Problem: Cancer      Relation: Father          Age of Onset: (Not Specified)          Comment: bladder  Problem: Schizophrenia      Relation: Father          Age of Onset: (Not Specified)  Problem: Cancer      Relation: Maternal Grandmother          Age of Onset: (Not Specified)          Comment: breast  Problem: Heart Disease      Relation: Maternal Grandmother          Age of Onset: (Not Specified)  Problem: Psychiatric Illness       Relation: Other          Age of Onset: (Not Specified)  Problem: Stroke      Relation: Maternal Aunt          Age of Onset: (Not Specified)  Problem: Other      Relation: Neg Hx          Age of Onset: (Not Specified)          Comment: no connective tissue disorders or known aortic disease      Current Outpatient Medications: •  zolpidem (AMBIEN) 5 MG Tab, , Disp: , Rfl: •  gabapentin (NEURONTIN) 300 MG Cap, Take 1 Cap by mouth 3 times a day., Disp: 90 Cap, Rfl: 1•  cyclobenzaprine (FLEXERIL) 5 mg tablet, Take 1 tablet by mouth 2 times a day as needed for up to 15 days., Disp: 30 tablet, Rfl: 0•  furosemide (LASIX) 20 MG Tab, Take 1 tablet by mouth every day., Disp: 10 tablet, Rfl: 0•  morphine (MS IR) 15 MG tablet, Take 1 tablet by mouth every 6 hours as needed for Severe Pain for up to 30 days., Disp: 120 tablet, Rfl: 0•  nystatin (MYCOSTATIN) powder, Apply 1 g topically 4 times a day. To affected areas, Disp: 30 g, Rfl: 6•  miconazole (MICOTIN) 2 % Cream, Apply 1 g topically 2 Times a Day., Disp: 60 g, Rfl: 1•  XIIDRA 5 % Solution, , Disp: , Rfl: •  pregabalin (LYRICA) 150 MG Cap, Take 1 Cap by mouth 3 times a day for 90 days., Disp: 90 Cap, Rfl: 2•  KEVZARA 200 MG/1.14ML Solution Auto-injector, , Disp: , Rfl: •  tobramycin-dexamethasone (TOBRADEX) 0.3-0.1 % Suspension, , Disp: , Rfl: •  losartan (COZAAR) 25 MG Tab, Take 1 Tab by mouth every day., Disp: 90 Tab, Rfl: 3•  DULoxetine (CYMBALTA) 30 MG Cap DR Particles, Take 1 Cap by mouth every day., Disp: 30 Cap, Rfl: 2•  omeprazole (PRILOSEC) 40 MG delayed-release capsule, Take 1 Cap by mouth every day., Disp: 30 Cap, Rfl: 6•  betamethasone dipropionate 0.05 % Cream, Apply 1 g to affected area(s) 2 times a day., Disp: 45 g, Rfl: 0•  ascorbic acid (VITAMIN C) 500 MG tablet, Take 1 Tab by mouth 2 times a day, with meals., Disp: 60 Tab, Rfl: 0•  aspirin 81 MG EC tablet, Take 1 Tab by mouth every day., Disp: 90 Tab, Rfl: 2 •  ferrous sulfate 325 (65 Fe) MG tablet, Take  "1 Tab by mouth 2 times a day, with meals., Disp: 60 Tab, Rfl: 0•  vitamin D 2000 UNIT Tab, Take 1 Tab by mouth every day., Disp: 60 Tab, Rfl: •  docusate sodium 100 MG Cap, Take 100 mg by mouth 2 Times a Day., Disp: 60 Cap, Rfl: •  nicotine (NICODERM) 7 MG/24HR PATCH 24 HR, Apply 1 Patch to skin as directed every 24 hours., Disp: 30 Patch, Rfl: 3•  acetaminophen (TYLENOL) 500 MG Tab, Take 500-1,000 mg by mouth every 6 hours as needed., Disp: , Rfl: •  Naloxone (NARCAN) 4 MG/0.1ML Liquid, One spray in one nostril for overdose and call 911. (Patient taking differently: Spray 1 Spray in nose as needed. One spray in one nostril for overdose and call 911.), Disp: 1 Package, Rfl: 0    Patient was instructed on the use of medications, either prescriptions or OTC and informed on when the appropriate follow up time period should be. In addition, patient was also instructed that should any acute worsening occur that they should notify this clinic asap or call 911.          Review of Systems   Constitutional: Negative.  Negative for chills and fever.   HENT: Negative.  Negative for hearing loss.    Eyes: Negative.  Negative for blurred vision and double vision.   Respiratory: Negative.  Negative for cough and hemoptysis.    Cardiovascular: Positive for leg swelling. Negative for chest pain and palpitations.   Gastrointestinal: Negative.  Negative for heartburn and nausea.   Genitourinary: Negative.  Negative for dysuria.   Musculoskeletal: Positive for back pain and joint pain. Negative for myalgias.   Skin: Negative.  Negative for rash.   Neurological: Negative.  Negative for dizziness, tingling and headaches.   Endo/Heme/Allergies: Negative.  Does not bruise/bleed easily.   Psychiatric/Behavioral: Negative.  Negative for depression and suicidal ideas.   All other systems reviewed and are negative.         Objective:     /76   Pulse 92   Temp 36.6 °C (97.8 °F)   Resp 18   Ht 1.803 m (5' 11\")   Wt (!) 128 kg (283 " lb)   BMI 39.47 kg/m²      Physical Exam  Vitals and nursing note reviewed.   Constitutional:       General: He is not in acute distress.     Appearance: He is well-developed. He is not diaphoretic.   HENT:      Head: Normocephalic and atraumatic.      Mouth/Throat:      Pharynx: No oropharyngeal exudate.   Eyes:      Pupils: Pupils are equal, round, and reactive to light.   Cardiovascular:      Rate and Rhythm: Normal rate and regular rhythm.      Heart sounds: Normal heart sounds. No murmur. No friction rub. No gallop.    Pulmonary:      Effort: Pulmonary effort is normal. No respiratory distress.      Breath sounds: Normal breath sounds. No wheezing or rales.   Chest:      Chest wall: No tenderness.   Musculoskeletal:      Right lower leg: Edema present.      Left lower leg: Edema present.   Neurological:      Mental Status: He is alert and oriented to person, place, and time.   Psychiatric:         Behavior: Behavior normal.         Thought Content: Thought content normal.         Judgment: Judgment normal.                 Assessment/Plan:     1. Chronic pain disorder  Patient is doing a slow taper off of opioids. Has been on qid morphine with last refill this week, for his next refill we will [place].  He did unfortunately have a fall this past month with some increase in pain in his left side but no fx seen on films    2. Chronic use of opiate drug for therapeutic purpose      3. Rheumatoid arthritis involving multiple sites, unspecified whether rheumatoid factor present ([facility])  Seeing rheum    4. Pedal edema  Improved with lasix per pcp

## 2021-03-29 ENCOUNTER — HOSPITAL ENCOUNTER (OUTPATIENT)
Dept: RADIOLOGY | Facility: MEDICAL CENTER | Age: 46
End: 2021-03-29
Attending: NURSE PRACTITIONER
Payer: COMMERCIAL

## 2021-03-30 ENCOUNTER — HOSPITAL ENCOUNTER (OUTPATIENT)
Dept: LAB | Facility: MEDICAL CENTER | Age: 46
End: 2021-03-30
Attending: NURSE PRACTITIONER
Payer: COMMERCIAL

## 2021-03-30 ENCOUNTER — HOSPITAL ENCOUNTER (OUTPATIENT)
Dept: LAB | Facility: MEDICAL CENTER | Age: 46
End: 2021-03-30
Attending: INTERNAL MEDICINE
Payer: COMMERCIAL

## 2021-03-30 DIAGNOSIS — R60.0 LOCALIZED EDEMA: ICD-10-CM

## 2021-03-30 PROCEDURE — 86140 C-REACTIVE PROTEIN: CPT

## 2021-03-30 PROCEDURE — 85025 COMPLETE CBC W/AUTO DIFF WBC: CPT

## 2021-03-30 PROCEDURE — 82306 VITAMIN D 25 HYDROXY: CPT

## 2021-03-30 PROCEDURE — 85025 COMPLETE CBC W/AUTO DIFF WBC: CPT | Mod: 91

## 2021-03-30 PROCEDURE — 80053 COMPREHEN METABOLIC PANEL: CPT

## 2021-03-30 PROCEDURE — 80048 BASIC METABOLIC PNL TOTAL CA: CPT

## 2021-03-30 PROCEDURE — 85652 RBC SED RATE AUTOMATED: CPT

## 2021-03-30 PROCEDURE — 36415 COLL VENOUS BLD VENIPUNCTURE: CPT

## 2021-03-31 LAB
ALBUMIN SERPL BCP-MCNC: 3.5 G/DL (ref 3.2–4.9)
ALBUMIN/GLOB SERPL: 1.2 G/DL
ALP SERPL-CCNC: 123 U/L (ref 30–99)
ALT SERPL-CCNC: 11 U/L (ref 2–50)
ANION GAP SERPL CALC-SCNC: 7 MMOL/L (ref 7–16)
ANION GAP SERPL CALC-SCNC: 8 MMOL/L (ref 7–16)
AST SERPL-CCNC: 17 U/L (ref 12–45)
BASOPHILS # BLD AUTO: 1 % (ref 0–1.8)
BASOPHILS # BLD AUTO: 1 % (ref 0–1.8)
BASOPHILS # BLD: 0.07 K/UL (ref 0–0.12)
BASOPHILS # BLD: 0.07 K/UL (ref 0–0.12)
BILIRUB SERPL-MCNC: 0.4 MG/DL (ref 0.1–1.5)
BUN SERPL-MCNC: 10 MG/DL (ref 8–22)
BUN SERPL-MCNC: 10 MG/DL (ref 8–22)
CALCIUM SERPL-MCNC: 8.5 MG/DL (ref 8.5–10.5)
CALCIUM SERPL-MCNC: 8.6 MG/DL (ref 8.5–10.5)
CHLORIDE SERPL-SCNC: 101 MMOL/L (ref 96–112)
CHLORIDE SERPL-SCNC: 104 MMOL/L (ref 96–112)
CO2 SERPL-SCNC: 27 MMOL/L (ref 20–33)
CO2 SERPL-SCNC: 28 MMOL/L (ref 20–33)
CREAT SERPL-MCNC: 0.62 MG/DL (ref 0.5–1.4)
CREAT SERPL-MCNC: 0.63 MG/DL (ref 0.5–1.4)
CRP SERPL HS-MCNC: 2.07 MG/DL (ref 0–0.75)
EOSINOPHIL # BLD AUTO: 0.22 K/UL (ref 0–0.51)
EOSINOPHIL # BLD AUTO: 0.24 K/UL (ref 0–0.51)
EOSINOPHIL NFR BLD: 3.1 % (ref 0–6.9)
EOSINOPHIL NFR BLD: 3.5 % (ref 0–6.9)
ERYTHROCYTE [DISTWIDTH] IN BLOOD BY AUTOMATED COUNT: 43.8 FL (ref 35.9–50)
ERYTHROCYTE [DISTWIDTH] IN BLOOD BY AUTOMATED COUNT: 44.7 FL (ref 35.9–50)
ERYTHROCYTE [SEDIMENTATION RATE] IN BLOOD BY WESTERGREN METHOD: 15 MM/HOUR (ref 0–15)
GLOBULIN SER CALC-MCNC: 2.9 G/DL (ref 1.9–3.5)
GLUCOSE SERPL-MCNC: 94 MG/DL (ref 65–99)
GLUCOSE SERPL-MCNC: 95 MG/DL (ref 65–99)
HCT VFR BLD AUTO: 36.3 % (ref 42–52)
HCT VFR BLD AUTO: 36.6 % (ref 42–52)
HGB BLD-MCNC: 11.9 G/DL (ref 14–18)
HGB BLD-MCNC: 11.9 G/DL (ref 14–18)
IMM GRANULOCYTES # BLD AUTO: 0.02 K/UL (ref 0–0.11)
IMM GRANULOCYTES # BLD AUTO: 0.02 K/UL (ref 0–0.11)
IMM GRANULOCYTES NFR BLD AUTO: 0.3 % (ref 0–0.9)
IMM GRANULOCYTES NFR BLD AUTO: 0.3 % (ref 0–0.9)
LYMPHOCYTES # BLD AUTO: 0.89 K/UL (ref 1–4.8)
LYMPHOCYTES # BLD AUTO: 0.96 K/UL (ref 1–4.8)
LYMPHOCYTES NFR BLD: 13 % (ref 22–41)
LYMPHOCYTES NFR BLD: 13.7 % (ref 22–41)
MCH RBC QN AUTO: 31.2 PG (ref 27–33)
MCH RBC QN AUTO: 31.2 PG (ref 27–33)
MCHC RBC AUTO-ENTMCNC: 32.5 G/DL (ref 33.7–35.3)
MCHC RBC AUTO-ENTMCNC: 32.8 G/DL (ref 33.7–35.3)
MCV RBC AUTO: 95.3 FL (ref 81.4–97.8)
MCV RBC AUTO: 95.8 FL (ref 81.4–97.8)
MONOCYTES # BLD AUTO: 0.59 K/UL (ref 0–0.85)
MONOCYTES # BLD AUTO: 0.62 K/UL (ref 0–0.85)
MONOCYTES NFR BLD AUTO: 8.6 % (ref 0–13.4)
MONOCYTES NFR BLD AUTO: 8.8 % (ref 0–13.4)
NEUTROPHILS # BLD AUTO: 5.06 K/UL (ref 1.82–7.42)
NEUTROPHILS # BLD AUTO: 5.13 K/UL (ref 1.82–7.42)
NEUTROPHILS NFR BLD: 73.1 % (ref 44–72)
NEUTROPHILS NFR BLD: 73.6 % (ref 44–72)
NRBC # BLD AUTO: 0 K/UL
NRBC # BLD AUTO: 0 K/UL
NRBC BLD-RTO: 0 /100 WBC
NRBC BLD-RTO: 0 /100 WBC
PLATELET # BLD AUTO: 239 K/UL (ref 164–446)
PLATELET # BLD AUTO: 257 K/UL (ref 164–446)
PMV BLD AUTO: 9.1 FL (ref 9–12.9)
PMV BLD AUTO: 9.2 FL (ref 9–12.9)
POTASSIUM SERPL-SCNC: 4.1 MMOL/L (ref 3.6–5.5)
POTASSIUM SERPL-SCNC: 4.1 MMOL/L (ref 3.6–5.5)
PROT SERPL-MCNC: 6.4 G/DL (ref 6–8.2)
RBC # BLD AUTO: 3.81 M/UL (ref 4.7–6.1)
RBC # BLD AUTO: 3.82 M/UL (ref 4.7–6.1)
SODIUM SERPL-SCNC: 136 MMOL/L (ref 135–145)
SODIUM SERPL-SCNC: 139 MMOL/L (ref 135–145)
WBC # BLD AUTO: 6.9 K/UL (ref 4.8–10.8)
WBC # BLD AUTO: 7 K/UL (ref 4.8–10.8)

## 2021-03-31 RX ORDER — FUROSEMIDE 20 MG/1
20 TABLET ORAL DAILY
Qty: 10 TABLET | Refills: 0 | Status: SHIPPED | OUTPATIENT
Start: 2021-03-31 | End: 2021-04-19

## 2021-04-01 LAB — 25(OH)D3 SERPL-MCNC: 13 NG/ML (ref 30–80)

## 2021-04-02 DIAGNOSIS — Z98.84 HISTORY OF GASTRIC BYPASS: ICD-10-CM

## 2021-04-02 DIAGNOSIS — M17.10 ARTHRITIS OF KNEE: ICD-10-CM

## 2021-04-02 DIAGNOSIS — M25.561 CHRONIC PAIN OF RIGHT KNEE: ICD-10-CM

## 2021-04-02 DIAGNOSIS — M06.9 RHEUMATOID ARTHRITIS INVOLVING MULTIPLE SITES (HCC): ICD-10-CM

## 2021-04-02 DIAGNOSIS — G89.29 CHRONIC PAIN OF RIGHT KNEE: ICD-10-CM

## 2021-04-02 DIAGNOSIS — M25.569 CHRONIC KNEE PAIN, UNSPECIFIED LATERALITY: ICD-10-CM

## 2021-04-02 DIAGNOSIS — Z87.11 HISTORY OF GASTRIC ULCER: ICD-10-CM

## 2021-04-02 DIAGNOSIS — Z96.651 S/P TOTAL KNEE ARTHROPLASTY, RIGHT: ICD-10-CM

## 2021-04-02 DIAGNOSIS — G89.29 CHRONIC KNEE PAIN, UNSPECIFIED LATERALITY: ICD-10-CM

## 2021-04-02 DIAGNOSIS — R53.81 DEBILITY: ICD-10-CM

## 2021-04-02 DIAGNOSIS — M79.2 NEUROPATHIC PAIN: ICD-10-CM

## 2021-04-02 DIAGNOSIS — I48.0 PAROXYSMAL ATRIAL FIBRILLATION (HCC): ICD-10-CM

## 2021-04-02 RX ORDER — PREGABALIN 150 MG/1
150 CAPSULE ORAL 3 TIMES DAILY
Qty: 90 CAPSULE | Refills: 2 | Status: SHIPPED | OUTPATIENT
Start: 2021-04-02 | End: 2021-07-01

## 2021-04-02 NOTE — TELEPHONE ENCOUNTER
Received request via: Patient    Was the patient seen in the last year in this department? Yes    Does the patient have an active prescription (recently filled or refills available) for medication(s) requested? No     Requested Prescriptions     Pending Prescriptions Disp Refills   • pregabalin (LYRICA) 150 MG Cap 90 capsule 2     Sig: Take 1 capsule by mouth 3 times a day for 90 days.

## 2021-04-07 RX ORDER — CYCLOBENZAPRINE HCL 5 MG
5 TABLET ORAL 2 TIMES DAILY PRN
Qty: 30 TABLET | Refills: 0 | Status: SHIPPED | OUTPATIENT
Start: 2021-04-07 | End: 2021-04-22

## 2021-04-12 RX ORDER — GABAPENTIN 300 MG/1
300 CAPSULE ORAL 3 TIMES DAILY
Qty: 90 CAPSULE | Refills: 2 | Status: SHIPPED
Start: 2021-04-12 | End: 2021-10-05

## 2021-04-13 ENCOUNTER — HOSPITAL ENCOUNTER (EMERGENCY)
Facility: MEDICAL CENTER | Age: 46
End: 2021-04-14
Attending: EMERGENCY MEDICINE
Payer: COMMERCIAL

## 2021-04-13 ENCOUNTER — APPOINTMENT (OUTPATIENT)
Dept: RADIOLOGY | Facility: MEDICAL CENTER | Age: 46
End: 2021-04-13
Attending: EMERGENCY MEDICINE
Payer: COMMERCIAL

## 2021-04-13 ENCOUNTER — HOSPITAL ENCOUNTER (OUTPATIENT)
Dept: RADIOLOGY | Facility: MEDICAL CENTER | Age: 46
End: 2021-04-13
Attending: INTERNAL MEDICINE
Payer: COMMERCIAL

## 2021-04-13 DIAGNOSIS — R10.2 ADNEXAL TENDERNESS, RIGHT: ICD-10-CM

## 2021-04-13 DIAGNOSIS — L40.3 SAPHO SYNDROME (HCC): ICD-10-CM

## 2021-04-13 DIAGNOSIS — M65.9 SAPHO SYNDROME (HCC): ICD-10-CM

## 2021-04-13 DIAGNOSIS — L70.9 SAPHO SYNDROME (HCC): ICD-10-CM

## 2021-04-13 DIAGNOSIS — L03.116 CELLULITIS OF LEFT LOWER EXTREMITY: ICD-10-CM

## 2021-04-13 DIAGNOSIS — M85.80 SAPHO SYNDROME (HCC): ICD-10-CM

## 2021-04-13 DIAGNOSIS — M86.9 SAPHO SYNDROME (HCC): ICD-10-CM

## 2021-04-13 DIAGNOSIS — S32.9XXS LATE EFFECT OF PELVIC FRACTURE: ICD-10-CM

## 2021-04-13 LAB — LACTATE BLD-SCNC: 0.8 MMOL/L (ref 0.5–2)

## 2021-04-13 PROCEDURE — 700117 HCHG RX CONTRAST REV CODE 255: Performed by: INTERNAL MEDICINE

## 2021-04-13 PROCEDURE — 36415 COLL VENOUS BLD VENIPUNCTURE: CPT

## 2021-04-13 PROCEDURE — 700105 HCHG RX REV CODE 258: Performed by: EMERGENCY MEDICINE

## 2021-04-13 PROCEDURE — 700111 HCHG RX REV CODE 636 W/ 250 OVERRIDE (IP): Performed by: EMERGENCY MEDICINE

## 2021-04-13 PROCEDURE — 87040 BLOOD CULTURE FOR BACTERIA: CPT | Mod: 91

## 2021-04-13 PROCEDURE — 85025 COMPLETE CBC W/AUTO DIFF WBC: CPT

## 2021-04-13 PROCEDURE — 96375 TX/PRO/DX INJ NEW DRUG ADDON: CPT

## 2021-04-13 PROCEDURE — 96365 THER/PROPH/DIAG IV INF INIT: CPT

## 2021-04-13 PROCEDURE — 83605 ASSAY OF LACTIC ACID: CPT

## 2021-04-13 PROCEDURE — 99284 EMERGENCY DEPT VISIT MOD MDM: CPT

## 2021-04-13 PROCEDURE — 72197 MRI PELVIS W/O & W/DYE: CPT

## 2021-04-13 PROCEDURE — 80053 COMPREHEN METABOLIC PANEL: CPT

## 2021-04-13 PROCEDURE — A9576 INJ PROHANCE MULTIPACK: HCPCS | Performed by: INTERNAL MEDICINE

## 2021-04-13 RX ORDER — HYDROMORPHONE HYDROCHLORIDE 1 MG/ML
1 INJECTION, SOLUTION INTRAMUSCULAR; INTRAVENOUS; SUBCUTANEOUS ONCE
Status: COMPLETED | OUTPATIENT
Start: 2021-04-14 | End: 2021-04-13

## 2021-04-13 RX ADMIN — SODIUM CHLORIDE 3 G: 900 INJECTION INTRAVENOUS at 23:50

## 2021-04-13 RX ADMIN — HYDROMORPHONE HYDROCHLORIDE 1 MG: 1 INJECTION, SOLUTION INTRAMUSCULAR; INTRAVENOUS; SUBCUTANEOUS at 23:50

## 2021-04-13 RX ADMIN — GADOTERIDOL 20 ML: 279.3 INJECTION, SOLUTION INTRAVENOUS at 20:03

## 2021-04-13 ASSESSMENT — ENCOUNTER SYMPTOMS
FEVER: 0
NECK PAIN: 0
SEIZURES: 0
RESPIRATORY NEGATIVE: 1
FLANK PAIN: 0
BACK PAIN: 0
BLOOD IN STOOL: 0
EYE PAIN: 0
SHORTNESS OF BREATH: 0
SORE THROAT: 0
CONSTITUTIONAL NEGATIVE: 1
HEADACHES: 0
WEAKNESS: 0
CHILLS: 0
EYES NEGATIVE: 1
BRUISES/BLEEDS EASILY: 0
FOCAL WEAKNESS: 0
GASTROINTESTINAL NEGATIVE: 1
FALLS: 1
CARDIOVASCULAR NEGATIVE: 1
ABDOMINAL PAIN: 0
MYALGIAS: 0
HALLUCINATIONS: 0

## 2021-04-13 ASSESSMENT — FIBROSIS 4 INDEX: FIB4 SCORE: 0.94

## 2021-04-14 VITALS
OXYGEN SATURATION: 95 % | BODY MASS INDEX: 35.99 KG/M2 | TEMPERATURE: 98.1 F | RESPIRATION RATE: 20 BRPM | WEIGHT: 257.06 LBS | SYSTOLIC BLOOD PRESSURE: 122 MMHG | DIASTOLIC BLOOD PRESSURE: 79 MMHG | HEART RATE: 84 BPM | HEIGHT: 71 IN

## 2021-04-14 LAB
ALBUMIN SERPL BCP-MCNC: 3.6 G/DL (ref 3.2–4.9)
ALBUMIN/GLOB SERPL: 1.1 G/DL
ALP SERPL-CCNC: 155 U/L (ref 30–99)
ALT SERPL-CCNC: 12 U/L (ref 2–50)
ANION GAP SERPL CALC-SCNC: 6 MMOL/L (ref 7–16)
AST SERPL-CCNC: 22 U/L (ref 12–45)
BASOPHILS # BLD AUTO: 1.3 % (ref 0–1.8)
BASOPHILS # BLD: 0.07 K/UL (ref 0–0.12)
BILIRUB SERPL-MCNC: 0.3 MG/DL (ref 0.1–1.5)
BUN SERPL-MCNC: 12 MG/DL (ref 8–22)
CALCIUM SERPL-MCNC: 8.8 MG/DL (ref 8.4–10.2)
CHLORIDE SERPL-SCNC: 101 MMOL/L (ref 96–112)
CO2 SERPL-SCNC: 30 MMOL/L (ref 20–33)
CREAT SERPL-MCNC: 0.82 MG/DL (ref 0.5–1.4)
EOSINOPHIL # BLD AUTO: 0.43 K/UL (ref 0–0.51)
EOSINOPHIL NFR BLD: 7.9 % (ref 0–6.9)
ERYTHROCYTE [DISTWIDTH] IN BLOOD BY AUTOMATED COUNT: 43.8 FL (ref 35.9–50)
GLOBULIN SER CALC-MCNC: 3.3 G/DL (ref 1.9–3.5)
GLUCOSE SERPL-MCNC: 71 MG/DL (ref 65–99)
HCT VFR BLD AUTO: 36.6 % (ref 42–52)
HGB BLD-MCNC: 11.8 G/DL (ref 14–18)
IMM GRANULOCYTES # BLD AUTO: 0.06 K/UL (ref 0–0.11)
IMM GRANULOCYTES NFR BLD AUTO: 1.1 % (ref 0–0.9)
LYMPHOCYTES # BLD AUTO: 1.33 K/UL (ref 1–4.8)
LYMPHOCYTES NFR BLD: 24.3 % (ref 22–41)
MCH RBC QN AUTO: 30.5 PG (ref 27–33)
MCHC RBC AUTO-ENTMCNC: 32.2 G/DL (ref 33.7–35.3)
MCV RBC AUTO: 94.6 FL (ref 81.4–97.8)
MONOCYTES # BLD AUTO: 0.7 K/UL (ref 0–0.85)
MONOCYTES NFR BLD AUTO: 12.8 % (ref 0–13.4)
NEUTROPHILS # BLD AUTO: 2.88 K/UL (ref 1.82–7.42)
NEUTROPHILS NFR BLD: 52.6 % (ref 44–72)
NRBC # BLD AUTO: 0 K/UL
NRBC BLD-RTO: 0 /100 WBC
PLATELET # BLD AUTO: 236 K/UL (ref 164–446)
PMV BLD AUTO: 8.9 FL (ref 9–12.9)
POTASSIUM SERPL-SCNC: 4.4 MMOL/L (ref 3.6–5.5)
PROT SERPL-MCNC: 6.9 G/DL (ref 6–8.2)
RBC # BLD AUTO: 3.87 M/UL (ref 4.7–6.1)
SODIUM SERPL-SCNC: 137 MMOL/L (ref 135–145)
WBC # BLD AUTO: 5.5 K/UL (ref 4.8–10.8)

## 2021-04-14 PROCEDURE — 93971 EXTREMITY STUDY: CPT | Mod: LT

## 2021-04-14 PROCEDURE — A9270 NON-COVERED ITEM OR SERVICE: HCPCS | Performed by: EMERGENCY MEDICINE

## 2021-04-14 PROCEDURE — 700102 HCHG RX REV CODE 250 W/ 637 OVERRIDE(OP): Performed by: EMERGENCY MEDICINE

## 2021-04-14 RX ORDER — SULFAMETHOXAZOLE AND TRIMETHOPRIM 800; 160 MG/1; MG/1
1 TABLET ORAL 2 TIMES DAILY
Qty: 10 TABLET | Refills: 0 | Status: SHIPPED | OUTPATIENT
Start: 2021-04-14 | End: 2021-04-19

## 2021-04-14 RX ORDER — HYDROCODONE BITARTRATE AND ACETAMINOPHEN 5; 325 MG/1; MG/1
2 TABLET ORAL ONCE
Status: COMPLETED | OUTPATIENT
Start: 2021-04-14 | End: 2021-04-14

## 2021-04-14 RX ADMIN — HYDROCODONE BITARTRATE AND ACETAMINOPHEN 2 TABLET: 5; 325 TABLET ORAL at 01:47

## 2021-04-14 NOTE — DISCHARGE INSTRUCTIONS
"Please discuss the following findings with your regular doctor:  Labs Reviewed   CBC WITH DIFFERENTIAL - Abnormal; Notable for the following components:       Result Value    RBC 3.87 (*)     Hemoglobin 11.8 (*)     Hematocrit 36.6 (*)     MCHC 32.2 (*)     MPV 8.9 (*)     Eosinophils 7.90 (*)     Immature Granulocytes 1.10 (*)     All other components within normal limits   COMP METABOLIC PANEL - Abnormal; Notable for the following components:    Anion Gap 6.0 (*)     Alkaline Phosphatase 155 (*)     All other components within normal limits   BLOOD CULTURE    Narrative:     1 of 2 for Blood Culture x 2 sites order. Per Hospital  Policy: Only change Specimen Src: to \"Line\" if specified by  physician order.   BLOOD CULTURE    Narrative:     2 of 2 blood culture x2  Sites order. Per Hospital Policy:  Only change Specimen Src: to \"Line\" if specified by physician  order.   LACTIC ACID   ESTIMATED GFR   LACTIC ACID   LACTIC ACID      US-EXTREMITY VENOUS LOWER UNILAT LEFT   Final Result         1.  No evidence of left lower extremity deep venous thrombosis. Evaluation is limited due to poor visualization of the popliteal and calf veins.   2.  Interstitial edema below the knee   3.  Mild left inguinal adenopathy         "

## 2021-04-14 NOTE — ED TRIAGE NOTES
Pt comes in c/o L leg/hip pain the last 10 days  progressively getting worse  Has wound infections to L leg shin area  Hx of osteomyelitis  Is worried he is getting it again  Also having swelling to lower legs

## 2021-04-14 NOTE — ED PROVIDER NOTES
ED Provider Note    CHIEF COMPLAINT  Chief Complaint   Patient presents with   • Wound Infection     L lower leg infection  started about 10 days       HPI  HPI    45 y.o. M p/w CC of Left leg pain and burning.     Pt takes Kevzara.  Last dose 8 days ago for RA.   Pt reports fall occurred 14 days ago after tripping on toy at home. Pt reports left hip pain since that time.   Pt w/  MRI of pelvis tonight, results pending.  Pt reports increased pain to LLE x 3-4 days.   Pt reports increased b/l lower extremity swelling x 10 days. Started on lasix.   Pt denies fever. Pt reports worsening burning in left leg.   PT reports initial pain in both legs with swelling, but now swelling has improved but burning sensation and pain in left leg.   No fever at home.   Pain in pelvis and extends down Left leg.     REVIEW OF SYSTEMS  Review of Systems   Constitutional: Negative.  Negative for chills and fever.   HENT: Negative.  Negative for ear pain and sore throat.    Eyes: Negative.  Negative for pain.   Respiratory: Negative.  Negative for shortness of breath.    Cardiovascular: Negative.  Negative for chest pain.   Gastrointestinal: Negative.  Negative for abdominal pain and blood in stool.   Genitourinary: Negative for dysuria and flank pain.   Musculoskeletal: Positive for falls and joint pain (Left hip and leg). Negative for back pain, myalgias and neck pain.   Skin: Positive for rash.   Neurological: Negative for focal weakness, seizures, weakness and headaches.   Endo/Heme/Allergies: Does not bruise/bleed easily.   Psychiatric/Behavioral: Negative for hallucinations and suicidal ideas.   All other systems reviewed and are negative.      PAST MEDICAL HISTORY   has a past medical history of ADD (attention deficit disorder), Alcohol abuse, Allergic rhinitis (5/21/2009), Anemia (09/2019), Anxiety (5/21/2009), Apnea, sleep, Arrhythmia, Back pain (5/21/2009), Bipolar disorder ([facility]), Bleeding ulcer (5/21/2009), Bleeding ulcer  (2009), Daytime sleepiness, Depression (2009), Eczema (2009), GERD (gastroesophageal reflux disease), Heart murmur, Hemorrhagic disorder ([facility]), History of bleeding ulcers (2015), Insomnia (2009), Migraine (2009), Morning headache, Obesity, morbid ([facility]) (2009), Osteomyelitis ([facility]) (10/7/2019), Pain (2019), Pubic ramus fracture ([facility]) (2019), Rheumatoid arteritis ([facility]) (2019), Sleep apnea (2009), and Snoring.    SOCIAL HISTORY  Social History     Tobacco Use   • Smoking status: Current Every Day Smoker     Packs/day: 0.25     Years: 3.00     Pack years: 0.75     Types: Cigarettes, Cigars     Last attempt to quit: 2019     Years since quittin.5   • Smokeless tobacco: Never Used   • Tobacco comment: occasional cigar   Substance and Sexual Activity   • Alcohol use: No     Alcohol/week: 0.0 oz     Comment: quit 2.5 yrs ago- past ETOH abuse   • Drug use: No   • Sexual activity: Yes     Partners: Female     Comment: ;        SURGICAL HISTORY   has a past surgical history that includes gastric bypass laparoscopic (); cholecystectomy (); irrigation & debridement ortho (Left, 10/9/2019); tendon transfer (Right, 2020); other surgical procedure; other surgical procedure (); and total knee arthroplasty (Right, 2020).    CURRENT MEDICATIONS  Home Medications     Reviewed by Justine Negrete R.N. (Registered Nurse) on 21 at 2258  Med List Status: Partial   Medication Last Dose Status   acetaminophen (TYLENOL) 500 MG Tab  Active   ascorbic acid (VITAMIN C) 500 MG tablet  Active   aspirin 81 MG EC tablet  Active   betamethasone dipropionate 0.05 % Cream  Active   cyclobenzaprine (FLEXERIL) 5 mg tablet  Active   docusate sodium 100 MG Cap  Active   DULoxetine (CYMBALTA) 30 MG Cap DR Particles  Active   ferrous sulfate 325 (65 Fe) MG tablet  Active   furosemide (LASIX) 20 MG Tab  Active   gabapentin (NEURONTIN) 300 MG Cap  Active   KEVZARA  "200 MG/1.14ML Solution Auto-injector  Active   losartan (COZAAR) 25 MG Tab  Active   miconazole (MICOTIN) 2 % Cream  Active   morphine (MS IR) 15 MG tablet  Active   Naloxone (NARCAN) 4 MG/0.1ML Liquid  Active   nystatin (MYCOSTATIN) powder  Active   omeprazole (PRILOSEC) 40 MG delayed-release capsule  Active   pregabalin (LYRICA) 150 MG Cap  Active   tobramycin-dexamethasone (TOBRADEX) 0.3-0.1 % Suspension  Active   vitamin D 2000 UNIT Tab  Active   XIIDRA 5 % Solution  Active   zolpidem (AMBIEN) 5 MG Tab  Active                ALLERGIES  Allergies   Allergen Reactions   • Benadryl [Altaryl]      \"I get agitated\"   • Nsaids      Bleeding, \"I had a bleeding ulcer\"   • Phenergan [Promethazine Hcl]      \"I get very agitated\"   • Penicillins      \"Had some dizziness\"  Tolerated Unasyn 10/2019       PHYSICAL EXAM  VITAL SIGNS: /79   Pulse 84   Temp 36.7 °C (98.1 °F) (Temporal)   Resp 20   Ht 1.803 m (5' 11\")   Wt 117 kg (257 lb 0.9 oz)   SpO2 95%   BMI 35.85 kg/m²  @KENNY[[id number]::@  Pulse ox interpretation: I interpret this pulse ox as normal.    Physical Exam   Constitutional: He is oriented to person, place, and time and well-developed, well-nourished, and in no distress.   HENT:   Head: Normocephalic.   Right Ear: External ear normal.   Left Ear: External ear normal.   Mouth/Throat: No oropharyngeal exudate.   Eyes: Pupils are equal, round, and reactive to light. EOM are normal. No scleral icterus.   Cardiovascular: Normal rate.   Pulmonary/Chest: Effort normal. No respiratory distress.   Abdominal: He exhibits no distension. There is no abdominal tenderness.   Musculoskeletal:         General: No deformity. Normal range of motion.      Cervical back: Normal range of motion.   Neurological: He is alert and oriented to person, place, and time. Coordination normal.   Skin: Skin is warm and dry. Rash noted. No erythema.   Psychiatric: Affect and judgment normal.   Nursing note and vitals reviewed.    Patient " with increased warmth to left lower extremity along with swelling and 2+ pitting edema to bilateral lower extremities. Left lower extremity with no crepitus. 2+ peripheral pulses.      DIAGNOSTIC STUDIES / PROCEDURES    LABS/EKG  Results for orders placed or performed during the hospital encounter of 04/13/21   CBC WITH DIFFERENTIAL   Result Value Ref Range    WBC 5.5 4.8 - 10.8 K/uL    RBC 3.87 (L) 4.70 - 6.10 M/uL    Hemoglobin 11.8 (L) 14.0 - 18.0 g/dL    Hematocrit 36.6 (L) 42.0 - 52.0 %    MCV 94.6 81.4 - 97.8 fL    MCH 30.5 27.0 - 33.0 pg    MCHC 32.2 (L) 33.7 - 35.3 g/dL    RDW 43.8 35.9 - 50.0 fL    Platelet Count 236 164 - 446 K/uL    MPV 8.9 (L) 9.0 - 12.9 fL    Neutrophils-Polys 52.60 44.00 - 72.00 %    Lymphocytes 24.30 22.00 - 41.00 %    Monocytes 12.80 0.00 - 13.40 %    Eosinophils 7.90 (H) 0.00 - 6.90 %    Basophils 1.30 0.00 - 1.80 %    Immature Granulocytes 1.10 (H) 0.00 - 0.90 %    Nucleated RBC 0.00 /100 WBC    Neutrophils (Absolute) 2.88 1.82 - 7.42 K/uL    Lymphs (Absolute) 1.33 1.00 - 4.80 K/uL    Monos (Absolute) 0.70 0.00 - 0.85 K/uL    Eos (Absolute) 0.43 0.00 - 0.51 K/uL    Baso (Absolute) 0.07 0.00 - 0.12 K/uL    Immature Granulocytes (abs) 0.06 0.00 - 0.11 K/uL    NRBC (Absolute) 0.00 K/uL   COMP METABOLIC PANEL   Result Value Ref Range    Sodium 137 135 - 145 mmol/L    Potassium 4.4 3.6 - 5.5 mmol/L    Chloride 101 96 - 112 mmol/L    Co2 30 20 - 33 mmol/L    Anion Gap 6.0 (L) 7.0 - 16.0    Glucose 71 65 - 99 mg/dL    Bun 12 8 - 22 mg/dL    Creatinine 0.82 0.50 - 1.40 mg/dL    Calcium 8.8 8.4 - 10.2 mg/dL    AST(SGOT) 22 12 - 45 U/L    ALT(SGPT) 12 2 - 50 U/L    Alkaline Phosphatase 155 (H) 30 - 99 U/L    Total Bilirubin 0.3 0.1 - 1.5 mg/dL    Albumin 3.6 3.2 - 4.9 g/dL    Total Protein 6.9 6.0 - 8.2 g/dL    Globulin 3.3 1.9 - 3.5 g/dL    A-G Ratio 1.1 g/dL   LACTIC ACID   Result Value Ref Range    Lactic Acid 0.8 0.5 - 2.0 mmol/L   ESTIMATED GFR   Result Value Ref Range    GFR If   American >60 >60 mL/min/1.73 m 2    GFR If Non African American >60 >60 mL/min/1.73 m 2       RADIOLOGY  US-EXTREMITY VENOUS LOWER UNILAT LEFT   Final Result         1.  No evidence of left lower extremity deep venous thrombosis. Evaluation is limited due to poor visualization of the popliteal and calf veins.   2.  Interstitial edema below the knee   3.  Mild left inguinal adenopathy           COURSE & MEDICAL DECISION MAKING  Pertinent Labs & Imaging studies reviewed by me. (See chart for details)  I verified that the patient was wearing a mask and I was wearing appropriate PPE every time I entered the room. The patient's mask was on the patient at all times during my encounter except for a brief view of the oropharynx.     45 y.o. male PMH osteo and RA p/w left leg pain and swelling.     Differential diagnosis includes but is not limited to:  Hx and PE c/w cellulitis  Pt w/ no crepitus or tissue necrosis to suggest concern for Necrotizing fascitis at this time  No hx to suggest underlying osteonecrosis or retained foreign body  Exam not c/w absccess at this time, no area of flutuance on exam or obvious fluid collection    Ultrasound ordered by me to rule out DVT with no evidence of at this time however incompletely imaged. I discussed with patient plan to obtain repeat ultrasound within the next 2 weeks and discuss this with his primary care physician.    Patient reports concerns for potential misdiagnosis with current primary care physician and plans to consider seeking additional opinion regarding chronicity of symptoms and repeat episodes of cellulitis of unclear etiology. Given initial dose of Unasyn in emergency department and discharged home on Bactrim. I believe this episode of cellulitis is likely secondary to bilateral lower extremity swelling resulting in unilateral cellulitis of left lower extremity.    Patient agrees to call to schedule close follow-up appointment with primary care physician within the  next week for wound reexamination and to follow-up with endocrinology as well. I discussed with patient the importance of close follow-up as if the symptoms were to continue 4 weeks osteomyelitis of lower extremity could develop.    MRI of left hip still pending at this time.  All questions answered by me prior to discharge and patient agrees to call and schedule close follow-up.      FINAL IMPRESSION  Visit Diagnoses     ICD-10-CM   1. Cellulitis of left lower extremity  L03.116              Electronically signed by: James Ye M.D., 4/13/2021 11:25 PM

## 2021-04-15 DIAGNOSIS — S32.9XXD CLOSED NONDISPLACED FRACTURE OF PELVIS WITH ROUTINE HEALING, UNSPECIFIED PART OF PELVIS, SUBSEQUENT ENCOUNTER: ICD-10-CM

## 2021-04-15 DIAGNOSIS — R69 MULTIPLE COMORBID CONDITIONS: ICD-10-CM

## 2021-04-15 DIAGNOSIS — Z79.891 CHRONIC USE OF OPIATE DRUG FOR THERAPEUTIC PURPOSE: ICD-10-CM

## 2021-04-19 ENCOUNTER — TELEPHONE (OUTPATIENT)
Dept: MEDICAL GROUP | Facility: MEDICAL CENTER | Age: 46
End: 2021-04-19

## 2021-04-19 ENCOUNTER — TELEMEDICINE (OUTPATIENT)
Dept: MEDICAL GROUP | Facility: MEDICAL CENTER | Age: 46
End: 2021-04-19
Payer: COMMERCIAL

## 2021-04-19 DIAGNOSIS — R60.0 LOCALIZED EDEMA: ICD-10-CM

## 2021-04-19 DIAGNOSIS — S32.9XXD CLOSED NONDISPLACED FRACTURE OF PELVIS WITH ROUTINE HEALING, UNSPECIFIED PART OF PELVIS, SUBSEQUENT ENCOUNTER: ICD-10-CM

## 2021-04-19 PROCEDURE — 99443 PR PHYSICIAN TELEPHONE EVALUATION 21-30 MIN: CPT | Mod: CR | Performed by: NURSE PRACTITIONER

## 2021-04-19 RX ORDER — BETAMETHASONE DIPROPIONATE 0.5 MG/G
CREAM TOPICAL
Qty: 45 G | Refills: 11 | Status: SHIPPED
Start: 2021-04-19 | End: 2022-01-31

## 2021-04-19 RX ORDER — BETAMETHASONE DIPROPIONATE 0.5 MG/G
1 CREAM TOPICAL 2 TIMES DAILY
Qty: 45 G | Refills: 3 | Status: SHIPPED | OUTPATIENT
Start: 2021-04-19 | End: 2021-04-22 | Stop reason: SDUPTHER

## 2021-04-19 RX ORDER — FUROSEMIDE 20 MG/1
20 TABLET ORAL
Qty: 30 TABLET | Refills: 1 | Status: SHIPPED | OUTPATIENT
Start: 2021-04-19 | End: 2023-01-01

## 2021-04-19 RX ORDER — CEPHALEXIN 500 MG/1
500 CAPSULE ORAL 3 TIMES DAILY
Qty: 15 CAPSULE | Refills: 0 | Status: SHIPPED | OUTPATIENT
Start: 2021-04-19 | End: 2021-04-24

## 2021-04-19 NOTE — PROGRESS NOTES
As a means of avoiding spread of COVID-19, this visit is being conducted by telephone. This telephone visit was initiated by the patient and they verbally consented.    Time at start of call: 08:13    Reason for Call:  Follow up ER visit    Richard Hubbard II via PHONE VISIT to discuss:  Unable to perform via VIDEO due to unable to have zoom connect and be able to hear him clearly.     Recently went to the ER for increased leg swelling. This is s/p fall.  There was concern for cellulitis/DVT. He reports he continues to have more swelling. denies  fevers. Reports it feels very red, hot and hard. Has completed Bactrim course as of yesterday. U/S negative for DVT.   Concerned about osteomyelitis.  Hx of this 2019.        MRI 4/13/2021  IMPRESSION:     1.  New nondisplaced fracture involving the left superior pubic ramus adjacent to the acetabulum. Marrow edema and enhancement involves that area and extends into the anterior/superior acetabulum.     2.  Healed left parasymphyseal fracture and bilateral sacral alar fractures.     3.  Mild marrow edema involving the right sacrum adjacent to the SI joint possibly representing a mild component of sacroiliitis.     4.  Bilateral posterior lateral upper thigh subcutaneous fluid collections. The largest on the right measures 3.6 x 3.3 x 2.2 cm in size. These may represent areas of injection sites or fat necrosis.      There were no vitals filed for this visit.    Patient Active Problem List    Diagnosis Date Noted   • Paroxysmal atrial fibrillation (HCC) 07/23/2020   • Essential hypertension 09/13/2019   • Rheumatoid arthritis (HCC) 09/08/2015   • Chronic pain of right knee 09/08/2015   • Chronic use of opiate drug for therapeutic purpose 01/18/2021   • Other chronic pain 01/18/2021   • At high risk for falls 12/10/2020   • Localized osteoporosis without current pathological fracture 10/19/2020   • History of osteomyelitis 08/14/2020   • History of immunosuppressive  therapy 08/14/2020   • S/P total knee arthroplasty, right 07/27/2020   • Cigarette nicotine dependence without complication 07/21/2020   • Aortic root dilatation (HCC) 09/13/2019   • Aortic stenosis, moderate 09/12/2019   • MONICA (obstructive sleep apnea) 03/09/2010   • Mood disorder (HCC) 05/21/2009   • Anxiety disorder 05/21/2009       Current Outpatient Medications   Medication Sig Dispense Refill   • furosemide (LASIX) 20 MG Tab Take 1 tablet by mouth 1 time a day as needed (for leg swelling). 30 tablet 1   • gabapentin (NEURONTIN) 300 MG Cap Take 1 capsule by mouth 3 times a day. 90 capsule 2   • pregabalin (LYRICA) 150 MG Cap Take 1 capsule by mouth 3 times a day for 90 days. 90 capsule 2   • zolpidem (AMBIEN) 5 MG Tab      • nystatin (MYCOSTATIN) powder Apply 1 g topically 4 times a day. To affected areas 30 g 6   • miconazole (MICOTIN) 2 % Cream Apply 1 g topically 2 Times a Day. 60 g 1   • XIIDRA 5 % Solution      • KEVZARA 200 MG/1.14ML Solution Auto-injector      • tobramycin-dexamethasone (TOBRADEX) 0.3-0.1 % Suspension      • losartan (COZAAR) 25 MG Tab Take 1 Tab by mouth every day. 90 Tab 3   • DULoxetine (CYMBALTA) 30 MG Cap DR Particles Take 1 Cap by mouth every day. 30 Cap 2   • omeprazole (PRILOSEC) 40 MG delayed-release capsule Take 1 Cap by mouth every day. 30 Cap 6   • ascorbic acid (VITAMIN C) 500 MG tablet Take 1 Tab by mouth 2 times a day, with meals. 60 Tab 0   • aspirin 81 MG EC tablet Take 1 Tab by mouth every day. 90 Tab 2   • ferrous sulfate 325 (65 Fe) MG tablet Take 1 Tab by mouth 2 times a day, with meals. 60 Tab 0   • vitamin D 2000 UNIT Tab Take 1 Tab by mouth every day. 60 Tab    • docusate sodium 100 MG Cap Take 100 mg by mouth 2 Times a Day. 60 Cap    • acetaminophen (TYLENOL) 500 MG Tab Take 500-1,000 mg by mouth every 6 hours as needed.     • Naloxone (NARCAN) 4 MG/0.1ML Liquid One spray in one nostril for overdose and call 911. (Patient taking differently: Spray 1 Spray in nose  as needed. One spray in one nostril for overdose and call 911.) 1 Package 0   • HYDROcodone-acetaminophen (NORCO) 5-325 MG Tab per tablet Take 1 tablet by mouth every 6 hours as needed (for break through pain) for up to 30 days. 60 tablet 0   • betamethasone dipropionate 0.05 % Cream Apply 1 g topically to affected areas twice daily for 14 days. 45 g 11   • morphine (MS IR) 15 MG tablet Take 1 tablet by mouth every 6 hours as needed for Severe Pain for up to 30 days. 120 tablet 0   • betamethasone dipropionate 0.05 % Cream Apply 1 g twice daily to affected areas for 14 days. 45 g 11     No current facility-administered medications for this visit.       Assessment and plan:    1. Localized edema  Secondary to fall. Continues to be present. DVT negative. MRI with concerning findings-left pubic rami fx. Will need Ortho follow up.  Patient continues to have edema and erythema on his leg. Concern for cellulitis-will extended with Kelfex and lasix. Emergent precautions discussed.   - cephALEXin (KEFLEX) 500 MG Cap; Take 1 capsule by mouth 3 times a day for 5 days.  Dispense: 15 capsule; Refill: 0  - furosemide (LASIX) 20 MG Tab; Take 1 tablet by mouth 1 time a day as needed (for leg swelling).  Dispense: 30 tablet; Refill: 1    2. Closed nondisplaced fracture of pelvis with routine healing, unspecified part of pelvis, subsequent encounter  New finding on MRI from ER. Concern for possible fluid collections. Has hx of this-and ended up with osteomyelitis. Will needs referral back to Orthopedics.     Follow up as needed      Time at end of call: 08:42    Total Time Spent: 21-30 minutes        Brit AGUDELO

## 2021-04-19 NOTE — TELEPHONE ENCOUNTER
[facility] would like the prescription for Betamethasone Dipropionate 0.05% to be re sent to them but including location of application and day supply for insurance purposes

## 2021-04-22 DIAGNOSIS — G89.4 CHRONIC PAIN DISORDER: ICD-10-CM

## 2021-04-22 RX ORDER — MORPHINE SULFATE 15 MG/1
15 TABLET ORAL EVERY 6 HOURS PRN
Qty: 120 TABLET | Refills: 0 | Status: SHIPPED | OUTPATIENT
Start: 2021-04-22 | End: 2021-05-20 | Stop reason: SDUPTHER

## 2021-04-22 RX ORDER — BETAMETHASONE DIPROPIONATE 0.5 MG/G
CREAM TOPICAL
Qty: 45 G | Refills: 11 | Status: ON HOLD | OUTPATIENT
Start: 2021-04-22 | End: 2023-01-01

## 2021-04-22 NOTE — PROGRESS NOTES
He will be transferring his chronic pain treatment over to [facility].  Will send 1 month at this time of his pain medication in the interim.  checked.

## 2021-04-24 ENCOUNTER — APPOINTMENT (OUTPATIENT)
Dept: RADIOLOGY | Facility: MEDICAL CENTER | Age: 46
End: 2021-04-24
Attending: INTERNAL MEDICINE
Payer: COMMERCIAL

## 2021-04-25 ENCOUNTER — HOSPITAL ENCOUNTER (OUTPATIENT)
Dept: RADIOLOGY | Facility: MEDICAL CENTER | Age: 46
End: 2021-04-25
Attending: INTERNAL MEDICINE
Payer: COMMERCIAL

## 2021-04-25 DIAGNOSIS — M85.80 SAPHO SYNDROME (HCC): ICD-10-CM

## 2021-04-25 DIAGNOSIS — M86.9 SAPHO SYNDROME (HCC): ICD-10-CM

## 2021-04-25 DIAGNOSIS — M81.8 IDIOPATHIC OSTEOPOROSIS: ICD-10-CM

## 2021-04-25 DIAGNOSIS — M79.662 BILATERAL CALF PAIN: ICD-10-CM

## 2021-04-25 DIAGNOSIS — L40.3 SAPHO SYNDROME (HCC): ICD-10-CM

## 2021-04-25 DIAGNOSIS — L70.9 SAPHO SYNDROME (HCC): ICD-10-CM

## 2021-04-25 DIAGNOSIS — M65.9 SAPHO SYNDROME (HCC): ICD-10-CM

## 2021-04-25 DIAGNOSIS — M79.661 BILATERAL CALF PAIN: ICD-10-CM

## 2021-04-25 DIAGNOSIS — M79.652 LEFT THIGH PAIN: ICD-10-CM

## 2021-04-25 PROCEDURE — 700117 HCHG RX CONTRAST REV CODE 255: Performed by: INTERNAL MEDICINE

## 2021-04-25 PROCEDURE — 73720 MRI LWR EXTREMITY W/O&W/DYE: CPT | Mod: LT

## 2021-04-25 PROCEDURE — A9576 INJ PROHANCE MULTIPACK: HCPCS | Performed by: INTERNAL MEDICINE

## 2021-04-25 RX ADMIN — GADOTERIDOL 20 ML: 279.3 INJECTION, SOLUTION INTRAVENOUS at 18:55

## 2021-04-27 DIAGNOSIS — Z79.891 CHRONIC USE OF OPIATE DRUG FOR THERAPEUTIC PURPOSE: ICD-10-CM

## 2021-04-27 RX ORDER — HYDROCODONE BITARTRATE AND ACETAMINOPHEN 5; 325 MG/1; MG/1
1 TABLET ORAL EVERY 6 HOURS PRN
Qty: 60 TABLET | Refills: 0 | Status: SHIPPED | OUTPATIENT
Start: 2021-04-27 | End: 2021-05-27

## 2021-05-03 ENCOUNTER — PRE-ADMISSION TESTING (OUTPATIENT)
Dept: ADMISSIONS | Facility: MEDICAL CENTER | Age: 46
End: 2021-05-03
Attending: INTERNAL MEDICINE
Payer: COMMERCIAL

## 2021-05-04 ENCOUNTER — APPOINTMENT (OUTPATIENT)
Dept: RADIOLOGY | Facility: MEDICAL CENTER | Age: 46
End: 2021-05-04
Attending: INTERNAL MEDICINE
Payer: COMMERCIAL

## 2021-05-04 ENCOUNTER — HOSPITAL ENCOUNTER (OUTPATIENT)
Facility: MEDICAL CENTER | Age: 46
End: 2021-05-04
Attending: INTERNAL MEDICINE | Admitting: RADIOLOGY
Payer: COMMERCIAL

## 2021-05-04 VITALS
RESPIRATION RATE: 18 BRPM | WEIGHT: 241.4 LBS | HEIGHT: 71 IN | HEART RATE: 84 BPM | TEMPERATURE: 98.4 F | BODY MASS INDEX: 33.8 KG/M2 | OXYGEN SATURATION: 94 % | SYSTOLIC BLOOD PRESSURE: 109 MMHG | DIASTOLIC BLOOD PRESSURE: 74 MMHG

## 2021-05-04 DIAGNOSIS — L02.416 ABSCESS OF LEFT LEG: ICD-10-CM

## 2021-05-04 LAB
APPEARANCE FLD: NORMAL
BODY FLD TYPE: NORMAL
COLOR FLD: NORMAL
CSF COMMENTS 1658: NORMAL
RBC # FLD: NORMAL CELLS/UL
WBC # FLD: NORMAL CELLS/UL

## 2021-05-04 PROCEDURE — 87077 CULTURE AEROBIC IDENTIFY: CPT | Mod: 91

## 2021-05-04 PROCEDURE — 4410213 CT-CYST ASPIRATION-MISC

## 2021-05-04 PROCEDURE — 87205 SMEAR GRAM STAIN: CPT

## 2021-05-04 PROCEDURE — 160002 HCHG RECOVERY MINUTES (STAT)

## 2021-05-04 PROCEDURE — 89051 BODY FLUID CELL COUNT: CPT

## 2021-05-04 PROCEDURE — 87186 SC STD MICRODIL/AGAR DIL: CPT

## 2021-05-04 PROCEDURE — 87070 CULTURE OTHR SPECIMN AEROBIC: CPT

## 2021-05-04 ASSESSMENT — FIBROSIS 4 INDEX: FIB4 SCORE: 1.18

## 2021-05-04 NOTE — DISCHARGE INSTRUCTIONS
ACTIVITY: Rest and take it easy for the first 24 hours.  A responsible adult is recommended to remain with you during that time.  It is normal to feel sleepy.  We encourage you to not do anything that requires balance, judgment or coordination.    MILD FLU-LIKE SYMPTOMS ARE NORMAL. YOU MAY EXPERIENCE GENERALIZED MUSCLE ACHES, THROAT IRRITATION, HEADACHE AND/OR SOME NAUSEA.    FOR 24 HOURS DO NOT:  Drive, operate machinery or run household appliances.  Drink beer or alcoholic beverages.   Make important decisions or sign legal documents.    SPECIAL INSTRUCTIONS:   Percutaneous Abscess Drain, Care After  This sheet gives you information about how to care for yourself after your procedure. Your health care provider may also give you more specific instructions. If you have problems or questions, contact your health care provider.  What can I expect after the procedure?  After your procedure, it is common to have:  · A small amount of bruising and discomfort in the area where the drainage tube (catheter) was placed.  · Sleepiness and fatigue. This should go away after the medicines you were given have worn off.  Follow these instructions at home:  Incision care  · Follow instructions from your health care provider about how to take care of your incision. Make sure you:  ? Wash your hands with soap and water before you change your bandage (dressing). If soap and water are not available, use hand .  ? Change your dressing as told by your health care provider.  ? Leave stitches (sutures), skin glue, or adhesive strips in place. These skin closures may need to stay in place for 2 weeks or longer. If adhesive strip edges start to loosen and curl up, you may trim the loose edges. Do not remove adhesive strips completely unless your health care provider tells you to do that.  · Check your incision area every day for signs of infection. Check for:  ? More redness, swelling, or pain.  ? More fluid or  blood.  ? Warmth.  ? Pus or a bad smell.  ? Fluid leaking from around your catheter (instead of fluid draining through your catheter).  Catheter care    · Follow instructions from your health care provider about emptying and cleaning your catheter and collection bag. You may need to clean the catheter every day so it does not clog.  · If directed, write down the following information every time you empty your bag:  ? The date and time.  ? The amount of drainage.  General instructions  · Rest at home for 1-2 days after your procedure. Return to your normal activities as told by your health care provider.  · Do not take baths, swim, or use a hot tub for 24 hours after your procedure, or until your health care provider says that this is okay.  · Take over-the-counter and prescription medicines only as told by your health care provider.  · Keep all follow-up visits as told by your health care provider. This is important.  Contact a health care provider if:  · You have less than 10 mL of drainage a day for 2-3 days in a row, or as directed by your health care provider.  · You have more redness, swelling, or pain around your incision area.  · You have more fluid or blood coming from your incision area.  · Your incision area feels warm to the touch.  · You have pus or a bad smell coming from your incision area.  · You have fluid leaking from around your catheter (instead of through your catheter).  · You have a fever or chills.  · You have pain that does not get better with medicine.  Get help right away if:  · Your catheter comes out.  · You suddenly stop having drainage from your catheter.  · You suddenly have blood in the fluid that is draining from your catheter.  · You become dizzy or you faint.  · You develop a rash.  · You have nausea or vomiting.  · You have difficulty breathing or you feel short of breath.  · You develop chest pain.  · You have problems with your speech or vision.  · You have trouble balancing or  moving your arms or legs.  Summary  · It is common to have a small amount of bruising and discomfort in the area where the drainage tube (catheter) was placed.  · You may be directed to record the amount of drainage from the bag every time you empty it.  · Follow instructions from your health care provider about emptying and cleaning your catheter and collection bag.      DIET: To avoid nausea, slowly advance diet as tolerated, avoiding spicy or greasy foods for the first day.  Add more substantial food to your diet according to your physician's instructions.  Babies can be fed formula or breast milk as soon as they are hungry.  INCREASE FLUIDS AND FIBER TO AVOID CONSTIPATION.    SURGICAL DRESSING/BATHING: Keep dressing clean and dry for 24 hours.  May remove dressing on 5/5/21 after 4:00 PM.  You do not need to replace the dressing.  Do not submerge area in water (no swimming, tub baths, etc.) for 7 days.    FOLLOW-UP APPOINTMENT:  A follow-up appointment should be arranged with your doctor in 1-2 weeks; call to schedule.    You should CALL YOUR PHYSICIAN if you develop:  Fever greater than 101 degrees F.  Pain not relieved by medication, or persistent nausea or vomiting.  Excessive bleeding (blood soaking through dressing) or unexpected drainage from the wound.  Extreme redness or swelling around the incision site, drainage of pus or foul smelling drainage.  Inability to urinate or empty your bladder within 8 hours.  Problems with breathing or chest pain.    You should call 911 if you develop problems with breathing or chest pain.  If you are unable to contact your doctor or surgical center, you should go to the nearest emergency room or urgent care center.  Physician's telephone #: [phone]    If any questions arise, call your doctor.  If your doctor is not available, please feel free to call the Surgical Center at [phone]. The Contact Center is open Monday through Friday 7AM to 5PM and may speak  to a nurse at [phone], or toll free at [phone].     A registered nurse may call you a few days after your surgery to see how you are doing after your procedure.    MEDICATIONS: Resume taking daily medication.  Take prescribed pain medication with food.  If no medication is prescribed, you may take non-aspirin pain medication if needed.  PAIN MEDICATION CAN BE VERY CONSTIPATING.  Take a stool softener or laxative such as senokot, pericolace, or milk of magnesia if needed.    If your physician has prescribed pain medication that includes Acetaminophen (Tylenol), do not take additional Acetaminophen (Tylenol) while taking the prescribed medication.    Depression / Suicide Risk    As you are discharged from this [facility] facility, it is important to learn how to keep safe from harming yourself.    Recognize the warning signs:  · Abrupt changes in personality, positive or negative- including increase in energy   · Giving away possessions  · Change in eating patterns- significant weight changes-  positive or negative  · Change in sleeping patterns- unable to sleep or sleeping all the time   · Unwillingness or inability to communicate  · Depression  · Unusual sadness, discouragement and loneliness  · Talk of wanting to die  · Neglect of personal appearance   · Rebelliousness- reckless behavior  · Withdrawal from people/activities they love  · Confusion- inability to concentrate     If you or a loved one observes any of these behaviors or has concerns about self-harm, here's what you can do:  · Talk about it- your feelings and reasons for harming yourself  · Remove any means that you might use to hurt yourself (examples: pills, rope, extension cords, firearm)  · Get professional help from the community (Mental Health, Substance Abuse, psychological counseling)  · Do not be alone:Call your Safe Contact- someone whom you trust who will be there for you.  · Call your local CRISIS HOTLINE 003-9159 or  101-773-6130  · Call your local Children's Mobile Crisis Response Team [place] (790) 745-7878 or www.[employer]helfX  · Call the toll free National Suicide Prevention Hotlines   · National Suicide Prevention Lifeline 071-779-WZJP (3078)  · National [employer] Resources Line Network 800-SUICIDE (722-9802)

## 2021-05-04 NOTE — OR SURGEON
Immediate Post- Operative Note        PostOp Diagnosis: HX PELVIC FX, OSTEOMYELITIS. SEVERE LLE SWELLING. HYPOENHANCING AREAS OVER SHIN, POSSIBLE SQ ABSCESS      Procedure(s): US AND CT GUIDED NEEDLE ASPIRATION    NO DISCRETE FLUID POCKET IDENTIFIED. ISLANDS OF PRESUMED FLUID/EDEMA BETWEEN AREAS OF SQ FAT. USING LANDMARKS FROM MRI STUDY TARGETED SUSPECT AREA.     EVACUATION: 1.5 ML PINKISH-RED CLOUDY FLUID.    SENT FOR: C+S, GRAM, CELL COUNT      Estimated Blood Loss: Less than 5 ml        Complications: None            5/4/2021     2:36 PM     Gatito Caceres M.D.

## 2021-05-04 NOTE — PROGRESS NOTES
Patient underwent a left leg abscess aspiration by Dr. Caceres, assisted by Holger EDMOND.     Procedure site was marked by MD and verified using imaging guidance. Patient was placed in a supine position. Time Out taken per safety protocol. MD Caceres located fluid pocket using imaging guidance, he aspirated 1ml light pink fluid. A gauze and tape dressing was placed over left lower lateral leg surgical site. Report called to Lori CUEVAS. Pt transported by lexi with this ACLS RN to [facility]. During bedside handoff with Lori CUEVAS, leg dressing C/D/I

## 2021-05-04 NOTE — OR NURSING
1453 Pt arrived to PPU with IR RN.  AAOx4.  VSS.  Denies pain and nausea.  Left leg dressing CDI and soft.  Belongings returned to pt bedside.    1530 Discharge instructions reviewed with the pt.  All verbalize understanding and demonstrate teach back.  Discharge criteria met.  IV removed.  Pt dressed and ambulated to bathroom.    1540 Discharged with walker with RN escort to self.  All belongings with pt.

## 2021-05-05 LAB
GRAM STN SPEC: NORMAL
SIGNIFICANT IND 70042: NORMAL
SITE SITE: NORMAL
SOURCE SOURCE: NORMAL

## 2021-05-07 LAB
BACTERIA WND AEROBE CULT: ABNORMAL
BACTERIA WND AEROBE CULT: ABNORMAL
GRAM STN SPEC: ABNORMAL
SIGNIFICANT IND 70042: ABNORMAL
SITE SITE: ABNORMAL
SOURCE SOURCE: ABNORMAL

## 2021-05-20 DIAGNOSIS — G89.4 CHRONIC PAIN DISORDER: ICD-10-CM

## 2021-05-20 RX ORDER — MORPHINE SULFATE 15 MG/1
15 TABLET ORAL EVERY 6 HOURS PRN
Qty: 120 TABLET | Refills: 0 | Status: SHIPPED | OUTPATIENT
Start: 2021-05-20 | End: 2021-06-19

## 2021-08-06 DIAGNOSIS — G47.01 INSOMNIA DUE TO MEDICAL CONDITION: ICD-10-CM

## 2021-08-06 RX ORDER — ZOLPIDEM TARTRATE 5 MG/1
5 TABLET ORAL NIGHTLY PRN
Qty: 30 TABLET | Refills: 1 | Status: SHIPPED | OUTPATIENT
Start: 2021-08-06 | End: 2021-10-05 | Stop reason: SDUPTHER

## 2021-08-06 RX ORDER — OMEPRAZOLE 40 MG/1
40 CAPSULE, DELAYED RELEASE ORAL DAILY
Qty: 30 CAPSULE | Refills: 6 | Status: ON HOLD | OUTPATIENT
Start: 2021-08-06 | End: 2023-01-01 | Stop reason: SDUPTHER

## 2021-08-10 DIAGNOSIS — H66.001 ACUTE SUPPURATIVE OTITIS MEDIA OF RIGHT EAR WITHOUT SPONTANEOUS RUPTURE OF TYMPANIC MEMBRANE, RECURRENCE NOT SPECIFIED: ICD-10-CM

## 2021-08-10 RX ORDER — AMOXICILLIN AND CLAVULANATE POTASSIUM 875; 125 MG/1; MG/1
1 TABLET, FILM COATED ORAL 2 TIMES DAILY
Qty: 20 TABLET | Refills: 0 | Status: SHIPPED | OUTPATIENT
Start: 2021-08-10 | End: 2021-08-10 | Stop reason: SDUPTHER

## 2021-08-10 RX ORDER — AMOXICILLIN AND CLAVULANATE POTASSIUM 875; 125 MG/1; MG/1
1 TABLET, FILM COATED ORAL 2 TIMES DAILY
Qty: 20 TABLET | Refills: 0 | Status: SHIPPED | OUTPATIENT
Start: 2021-08-10 | End: 2022-01-31

## 2021-08-10 RX ORDER — PREDNISONE 10 MG/1
TABLET ORAL
Qty: 15 TABLET | Refills: 0 | Status: SHIPPED | OUTPATIENT
Start: 2021-08-10 | End: 2022-01-31

## 2021-08-10 RX ORDER — PREDNISONE 10 MG/1
TABLET ORAL
Qty: 15 TABLET | Refills: 0 | Status: SHIPPED | OUTPATIENT
Start: 2021-08-10 | End: 2021-08-10 | Stop reason: SDUPTHER

## 2021-08-18 ENCOUNTER — APPOINTMENT (OUTPATIENT)
Dept: URGENT CARE | Facility: PHYSICIAN GROUP | Age: 46
End: 2021-08-18
Payer: COMMERCIAL

## 2021-08-18 ENCOUNTER — OFFICE VISIT (OUTPATIENT)
Dept: URGENT CARE | Facility: CLINIC | Age: 46
End: 2021-08-18
Payer: COMMERCIAL

## 2021-08-18 ENCOUNTER — APPOINTMENT (OUTPATIENT)
Dept: RADIOLOGY | Facility: IMAGING CENTER | Age: 46
End: 2021-08-18
Attending: FAMILY MEDICINE
Payer: COMMERCIAL

## 2021-08-18 VITALS
OXYGEN SATURATION: 98 % | HEIGHT: 68 IN | BODY MASS INDEX: 36.37 KG/M2 | SYSTOLIC BLOOD PRESSURE: 130 MMHG | WEIGHT: 240 LBS | TEMPERATURE: 98.4 F | RESPIRATION RATE: 16 BRPM | HEART RATE: 80 BPM | DIASTOLIC BLOOD PRESSURE: 76 MMHG

## 2021-08-18 DIAGNOSIS — S92.415A CLOSED NONDISPLACED FRACTURE OF PROXIMAL PHALANX OF LEFT GREAT TOE, INITIAL ENCOUNTER: ICD-10-CM

## 2021-08-18 DIAGNOSIS — S99.922A INJURY OF TOE ON LEFT FOOT, INITIAL ENCOUNTER: ICD-10-CM

## 2021-08-18 PROCEDURE — 99213 OFFICE O/P EST LOW 20 MIN: CPT | Performed by: FAMILY MEDICINE

## 2021-08-18 PROCEDURE — 73660 X-RAY EXAM OF TOE(S): CPT | Mod: TC,FY,LT | Performed by: FAMILY MEDICINE

## 2021-08-18 ASSESSMENT — FIBROSIS 4 INDEX: FIB4 SCORE: 1.21

## 2021-08-18 NOTE — PROGRESS NOTES
"Subjective     Jeovanny Hubbard II is a 46 y.o. male who presents with Foot Injury (x LT foot pain , )  Patient is here for evaluation of left toe injury after he was going up the stairs and missed the step and stepped left big toe.  History of prior fracture reported.  He does have osteoporosis.  He had a old boot at home that he used and he drove himself here to be checked out.  Review of system otherwise negative.  No other injuries.          HPI    ROS           Objective     /76   Pulse 80   Temp 36.9 °C (98.4 °F) (Temporal)   Resp 16   Ht 1.727 m (5' 8\")   Wt 109 kg (240 lb)   SpO2 98%   BMI 36.49 kg/m²      Physical Exam  Constitutional:       General: He is not in acute distress.     Appearance: He is not ill-appearing, toxic-appearing or diaphoretic.   Eyes:      Conjunctiva/sclera: Conjunctivae normal.   Cardiovascular:      Rate and Rhythm: Normal rate.   Pulmonary:      Effort: Pulmonary effort is normal. No respiratory distress.      Breath sounds: No stridor.   Musculoskeletal:      Left foot: Decreased range of motion (Decreased range of motion in the left big toe.  Tenderness on palpation of the first MTP joint). Normal capillary refill. Tenderness (Tenderness on palpation of the first MCP joint.  No bruising noted.  No erythema.) present. No swelling, deformity or laceration. Normal pulse.   Skin:     Coloration: Skin is not jaundiced or pale.   Neurological:      Mental Status: He is alert and oriented to person, place, and time.   Psychiatric:         Behavior: Behavior normal.         X-ray of the left big toe showed Acute-subacute first proximal phalanx intra-articular base fracture is nondisplaced         Assessment & Plan     ASSESSMENT:PLAN:  1. Closed nondisplaced fracture of proximal phalanx of left great toe, initial encounter  - Walking Boot  - REFERRAL TO SPORTS MEDICINE    2. Injury of toe on left foot, initial encounter  - DX-TOE(S) 2+ LEFT; Future  - Walking " Boot        Patient called and discussed report. Treatment is the same. Wearing the boot when up and about, over-the-counter medication of his choice as needed for pain. Referral to orthopedics  Plan per orders and instructions  Warning signs reviewed

## 2021-08-18 NOTE — PATIENT INSTRUCTIONS
Toe Fracture  You have a broken toe. Unless you have broken the big toe, these injuries usually heal in 2 to 3 weeks. The fifth toe (little toe) is the toe that is broken most often. Pain and swelling are common, and sometimes there is a deformity of the toe. Treatment includes reducing deformity, followed by rest, elevation, ice, mild pain medicine, and taping the toe. For the next 2 to 3 days stay off your feet as much as possible and apply ice packs to the injury for 20 to 30 minutes every 2 to 3 hours. Pain with weight bearing often is present until the fracture heals.   When you start walking again, use a shoe with a firm sole and plenty of room to keep the pressure off the broken toe. You can immobilize a toe fracture by taping it to the toe next to it. Place a small cotton ball between the toes to reduce skin rubbing and irritation when you apply the tape. Use paper tape if you are sensitive to adhesive tape. Change the tape every 2 days or more often if it gets wet. For fractures of the big toe, you may need crutches for several weeks and a better way to immobilize your toe such as a splint, cast or fracture boot. The reason for this is that the big toe bears most of your weight when you walk. Follow up with your caregiver as recommended to ensure that your injury is healing.   SEEK MEDICAL CARE IF:   You have increased pain, swelling, redness, or deformity of your toe.  MAKE SURE YOU:   · Understand these instructions.   · Will watch your condition.   · Will get help right away if you are not doing well or get worse.   Document Released: 01/25/2006 Document Revised: 03/11/2013 Document Reviewed: 01/20/2010  [employer]® Patient Information ©2013 [employer].

## 2021-10-05 ENCOUNTER — TELEMEDICINE (OUTPATIENT)
Dept: MEDICAL GROUP | Facility: MEDICAL CENTER | Age: 46
End: 2021-10-05
Payer: COMMERCIAL

## 2021-10-05 VITALS — HEIGHT: 68 IN | TEMPERATURE: 97.7 F | BODY MASS INDEX: 36.49 KG/M2

## 2021-10-05 DIAGNOSIS — M05.79 RHEUMATOID ARTHRITIS INVOLVING MULTIPLE SITES WITH POSITIVE RHEUMATOID FACTOR (HCC): Chronic | ICD-10-CM

## 2021-10-05 DIAGNOSIS — G47.01 INSOMNIA DUE TO MEDICAL CONDITION: ICD-10-CM

## 2021-10-05 DIAGNOSIS — R69 MULTIPLE COMORBID CONDITIONS: ICD-10-CM

## 2021-10-05 PROCEDURE — 99214 OFFICE O/P EST MOD 30 MIN: CPT | Mod: 95 | Performed by: NURSE PRACTITIONER

## 2021-10-05 RX ORDER — ZOLPIDEM TARTRATE 5 MG/1
5 TABLET ORAL NIGHTLY PRN
Qty: 30 TABLET | Refills: 3 | Status: SHIPPED | OUTPATIENT
Start: 2021-10-05 | End: 2022-01-31 | Stop reason: SDUPTHER

## 2021-10-05 NOTE — PROGRESS NOTES
Telemedicine Video Visit: Established Patient   This Remote Face to Face encounter was conducted via Zoom. Given the importance of social distancing and other strategies recommended to reduce the risk of COVID-19 transmission, I am providing medical care to this patient via audio/video visit in place of an in person visit at the request of the patient. Verbal consent to telehealth, risks, benefits, and consequences were discussed. Patient retains the right to withdraw at any time. All existing confidentiality protections apply. The patient has access to all transmitted medical information. No dissemination of any patient images or information to other entities without further written consent.  Subjective:     Chief Complaint   Patient presents with   • Medication Refill       Richard Hubbard II is a 46 y.o. male presenting for evaluation and management of:    1. Insomnia due to medical condition  Requesting a refill on his Ambien.   checked.  Currently followed by pain management for his chronic pain.  There has been no ADRs reported.    2. Multiple comorbid conditions  Has not yet established with an internal medicine provider.  We will update referral.    3. Rheumatoid arthritis involving multiple sites with positive rheumatoid factor (HCC)  Followed by Dr. Conway for this.  He mentions that he does have the sores on his feet which started off his nodules.  He mentions they have been present for over a year and then a few months ago calluses started to present.  They subsequently opened up and are having a difficult time healing.  This causes a great deal of pain for him.  It also causes difficulty with walking.  He shows me an open callus on his right foot, plantar aspect in the arch.  It does not appear to be infected at this time.  He is completing dressings on his own.  He has an upcoming podiatry appointment that his rheumatologist has referred him to.    ROS see above  Denies any recent fevers or  "chills. No nausea or vomiting. No chest pains or shortness of breath.     Allergies   Allergen Reactions   • Benadryl [Altaryl]      \"I get agitated\"   • Nsaids      Bleeding, \"I had a bleeding ulcer\"   • Phenergan [Promethazine Hcl]      \"I get very agitated\"   • Penicillins      \"Had some dizziness\"  Tolerated Unasyn 10/2019       Current medicines (including changes today)  Current Outpatient Medications   Medication Sig Dispense Refill   • zolpidem (AMBIEN) 5 MG Tab Take 1 Tablet by mouth at bedtime as needed for up to 30 days. 30 Tablet 3   • amoxicillin-clavulanate (AUGMENTIN) 875-125 MG Tab Take 1 tablet by mouth 2 times a day. 20 tablet 0   • omeprazole (PRILOSEC) 40 MG delayed-release capsule Take 1 capsule by mouth every day. 30 capsule 6   • betamethasone dipropionate 0.05 % Cream Apply 1 g topically to affected areas twice daily for 14 days. 45 g 11   • furosemide (LASIX) 20 MG Tab Take 1 tablet by mouth 1 time a day as needed (for leg swelling). 30 tablet 1   • betamethasone dipropionate 0.05 % Cream Apply 1 g twice daily to affected areas for 14 days. 45 g 11   • zolpidem (AMBIEN) 5 MG Tab      • nystatin (MYCOSTATIN) powder Apply 1 g topically 4 times a day. To affected areas 30 g 6   • miconazole (MICOTIN) 2 % Cream Apply 1 g topically 2 Times a Day. 60 g 1   • XIIDRA 5 % Solution      • losartan (COZAAR) 25 MG Tab Take 1 Tab by mouth every day. 90 Tab 3   • DULoxetine (CYMBALTA) 30 MG Cap DR Particles Take 1 Cap by mouth every day. 30 Cap 2   • ascorbic acid (VITAMIN C) 500 MG tablet Take 1 Tab by mouth 2 times a day, with meals. 60 Tab 0   • aspirin 81 MG EC tablet Take 1 Tab by mouth every day. 90 Tab 2   • ferrous sulfate 325 (65 Fe) MG tablet Take 1 Tab by mouth 2 times a day, with meals. 60 Tab 0   • vitamin D 2000 UNIT Tab Take 1 Tab by mouth every day. 60 Tab    • docusate sodium 100 MG Cap Take 100 mg by mouth 2 Times a Day. 60 Cap    • acetaminophen (TYLENOL) 500 MG Tab Take 500-1,000 mg by " mouth every 6 hours as needed.     • Naloxone (NARCAN) 4 MG/0.1ML Liquid One spray in one nostril for overdose and call 911. (Patient taking differently: Spray 1 Spray in nose as needed. One spray in one nostril for overdose and call 911.) 1 Package 0   • predniSONE (DELTASONE) 10 MG Tab 50 mg x 1 day; 40 mg x 1 day; 30 mg x 1 day; 20 mg x 1 day; 10 mg x 1 day 15 tablet 0     No current facility-administered medications for this visit.       Patient Active Problem List    Diagnosis Date Noted   • Chronic use of opiate drug for therapeutic purpose 01/18/2021   • Other chronic pain 01/18/2021   • At high risk for falls 12/10/2020   • Localized osteoporosis without current pathological fracture 10/19/2020   • History of osteomyelitis 08/14/2020   • History of immunosuppressive therapy 08/14/2020   • S/P total knee arthroplasty, right 07/27/2020   • Paroxysmal atrial fibrillation (HCC) 07/23/2020   • Cigarette nicotine dependence without complication 07/21/2020   • Aortic root dilatation (HCC) 09/13/2019   • Essential hypertension 09/13/2019   • Aortic stenosis, moderate 09/12/2019   • Rheumatoid arthritis (HCC) 09/08/2015   • Chronic pain of right knee 09/08/2015   • MONICA (obstructive sleep apnea) 03/09/2010   • Mood disorder (HCC) 05/21/2009   • Anxiety disorder 05/21/2009       Family History   Problem Relation Age of Onset   • Hypertension Mother    • Arthritis Mother    • Depression Mother    • Cancer Father         bladder   • Schizophrenia Father    • Cancer Maternal Grandmother         breast   • Heart Disease Maternal Grandmother    • Psychiatric Illness Other    • Stroke Maternal Aunt    • Other Neg Hx         no connective tissue disorders or known aortic disease       He  has a past medical history of ADD (attention deficit disorder), Alcohol abuse, Allergic rhinitis (5/21/2009), Anemia (09/2019), Anxiety (5/21/2009), Apnea, sleep, Arrhythmia, Back pain (5/21/2009), Bipolar disorder ([facility]), Bleeding ulcer  "(5/21/2009), Bleeding ulcer (5/21/2009), Bowel habit changes, Daytime sleepiness, Depression (5/21/2009), Eczema (5/21/2009), GERD (gastroesophageal reflux disease), Heart burn, Heart murmur, Hemorrhagic disorder ([facility]), History of bleeding ulcers (2/12/2015), Hypertension (05/03/2021), Insomnia (5/21/2009), Migraine (5/21/2009), Morning headache, Obesity, morbid (HCC) (5/21/2009), Osteomyelitis ([facility]) (10/7/2019), Pain (09/2019), Pubic ramus fracture ([facility]) (9/28/2019), Rheumatoid arteritis ([facility]) (09/2019), Sleep apnea (5/21/2009), and Snoring. He also has no past medical history of Hyperlipidemia.  He  has a past surgical history that includes gastric bypass laparoscopic (2000); irrigation & debridement ortho (Left, 10/9/2019); other surgical procedure; other surgical procedure (2019); pr total knee arthroplasty (Right, 7/22/2020); cholecystectomy (2000); tendon transfer (Right, 1/22/2020); and other orthopedic surgery (Right, 2020).       Objective:   Vitals obtained by patient:  Temp 36.5 °C (97.7 °F) (Tympanic)   Ht 1.727 m (5' 8\")   BMI 36.49 kg/m²     Physical Exam:  Constitutional: Alert, no distress, well-groomed.  Skin: No rashes in visible areas.  Eye: Round. Conjunctiva clear, lids normal. No icterus.   ENMT: Lips pink without lesions, good dentition, moist mucous membranes. Phonation normal.  Neck: No masses, no thyromegaly. Moves freely without pain.  CV: Pulse as reported by patient  Respiratory: Unlabored respiratory effort, no cough or audible wheeze  Psych: Alert and oriented x3, normal affect and mood.       Assessment and Plan:   The following treatment plan was discussed:     1. Insomnia due to medical condition  Chronic problem for the patient.  Patient does suffer from chronic pain and has multiple core morbidities that interfere with his ADLs and sleep.  There is concern as he is on chronic pain medication as well.   checked.  Have sent refills for patient.  Follow-up 4 months for " additional refills.  - zolpidem (AMBIEN) 5 MG Tab; Take 1 Tablet by mouth at bedtime as needed for up to 30 days.  Dispense: 30 Tablet; Refill: 3    2. Multiple comorbid conditions  Recommend establishing with an internal medicine provider.  - AMB REFERRAL TO ESTABLISH WITH [facility] P[facility]    3. Rheumatoid arthritis involving multiple sites with positive rheumatoid factor (HCC)  Chronic problem.  Continue follow with rheumatology for this.      Follow-up: Return in about 4 months (around 2/5/2022).    Face to Face Video Visit:     MARTÍN Turner

## 2021-10-28 ENCOUNTER — PATIENT MESSAGE (OUTPATIENT)
Dept: MEDICAL GROUP | Facility: MEDICAL CENTER | Age: 46
End: 2021-10-28

## 2021-10-29 NOTE — TELEPHONE ENCOUNTER
From: Richard Hubbard II  To: Nurse Practitioner Brit Fitch  Sent: 10/28/2021 4:38 PM PDT  Subject: Perscription Refill    Hey Cat,   Can I get a refill of the miconazole 2 % Cream? Please? Thanks so much!  Jeovanny Hubbard

## 2021-11-30 RX ORDER — NYSTATIN 100000 [USP'U]/G
POWDER TOPICAL
Qty: 30 G | Refills: 6 | Status: SHIPPED | OUTPATIENT
Start: 2021-11-30 | End: 2023-01-01

## 2022-01-01 ENCOUNTER — APPOINTMENT (OUTPATIENT)
Dept: RADIOLOGY | Facility: MEDICAL CENTER | Age: 47
End: 2022-01-01
Attending: ORTHOPAEDIC SURGERY
Payer: COMMERCIAL

## 2022-01-01 ENCOUNTER — APPOINTMENT (OUTPATIENT)
Dept: RADIOLOGY | Facility: MEDICAL CENTER | Age: 47
End: 2022-01-01
Attending: INTERNAL MEDICINE
Payer: COMMERCIAL

## 2022-01-27 NOTE — ED NOTES
"Pt called out requesting another breathing treatment. States \"It's so dry! I just can't cough it up!\". Dr. Sagastume notified.  " Refill Requested:    metformin (GLUCOPHAGE) 1000 MG tablet     Sig: TAKE 1 AND 1/2 TABLET BY MOUTH EVERY MORNING AND EVERY EVENING    Disp:  225 tablet    Refills:  0        Last Office Visit: 11/5/21    Script sent to the pharmacy.

## 2022-01-31 ENCOUNTER — TELEMEDICINE (OUTPATIENT)
Dept: MEDICAL GROUP | Facility: MEDICAL CENTER | Age: 47
End: 2022-01-31
Payer: COMMERCIAL

## 2022-01-31 DIAGNOSIS — G47.01 INSOMNIA DUE TO MEDICAL CONDITION: ICD-10-CM

## 2022-01-31 DIAGNOSIS — Z86.16 PERSONAL HISTORY OF COVID-19: ICD-10-CM

## 2022-01-31 DIAGNOSIS — R69 MULTIPLE COMORBID CONDITIONS: ICD-10-CM

## 2022-01-31 PROBLEM — I10 ESSENTIAL HYPERTENSION: Chronic | Status: ACTIVE | Noted: 2019-09-13

## 2022-01-31 PROBLEM — M81.6 LOCALIZED OSTEOPOROSIS WITHOUT CURRENT PATHOLOGICAL FRACTURE: Chronic | Status: ACTIVE | Noted: 2020-10-19

## 2022-01-31 PROBLEM — Z91.81 AT HIGH RISK FOR FALLS: Chronic | Status: ACTIVE | Noted: 2020-12-10

## 2022-01-31 PROBLEM — Z92.25 HISTORY OF IMMUNOSUPPRESSIVE THERAPY: Chronic | Status: ACTIVE | Noted: 2020-08-14

## 2022-01-31 PROBLEM — F17.210 CIGARETTE NICOTINE DEPENDENCE WITHOUT COMPLICATION: Chronic | Status: ACTIVE | Noted: 2020-07-21

## 2022-01-31 PROBLEM — Z79.891 CHRONIC USE OF OPIATE DRUG FOR THERAPEUTIC PURPOSE: Chronic | Status: ACTIVE | Noted: 2021-01-18

## 2022-01-31 PROCEDURE — 99213 OFFICE O/P EST LOW 20 MIN: CPT | Mod: 95 | Performed by: NURSE PRACTITIONER

## 2022-01-31 RX ORDER — ZOLPIDEM TARTRATE 5 MG/1
5 TABLET ORAL NIGHTLY PRN
Qty: 30 TABLET | Refills: 5 | Status: SHIPPED | OUTPATIENT
Start: 2022-01-31 | End: 2022-03-02

## 2022-01-31 RX ORDER — MORPHINE SULFATE 15 MG/1
15 TABLET ORAL EVERY 6 HOURS PRN
Status: ON HOLD | COMMUNITY
Start: 2022-01-06 | End: 2023-01-01

## 2022-01-31 RX ORDER — ADALIMUMAB 40MG/0.4ML
KIT SUBCUTANEOUS
COMMUNITY
Start: 2022-01-28 | End: 2023-01-01

## 2022-01-31 RX ORDER — MORPHINE SULFATE 15 MG/1
15 TABLET, FILM COATED, EXTENDED RELEASE ORAL EVERY 12 HOURS
Status: ON HOLD | COMMUNITY
Start: 2021-12-10 | End: 2023-01-01

## 2022-01-31 NOTE — PROGRESS NOTES
"Telemedicine Video Visit: Established Patient   This Remote Face to Face encounter was conducted via Zoom. Given the importance of social distancing and other strategies recommended to reduce the risk of COVID-19 transmission, I am providing medical care to this patient via audio/video visit in place of an in person visit at the request of the patient. Verbal consent to telehealth, risks, benefits, and consequences were discussed. Patient retains the right to withdraw at any time. All existing confidentiality protections apply. The patient has access to all transmitted medical information. No dissemination of any patient images or information to other entities without further written consent.  Subjective:     Chief Complaint   Patient presents with   • Medication Refill       Richard Hubbard is a 46 y.o. male presenting for evaluation and management of:    1. Insomnia due to medical condition  Requesting a refill on his Ambien.   checked last fill on 12/28/21.  Currently followed by pain management for his chronic pain medications of morphine.  No other inconsistencies.  Denies any ADRs reported.    2. Personal history of COVID-19  +home test on 1/24/22.  Symptoms are stable at this time.  No respiratory distress.    3. Multiple comorbid conditions  Chronic medical conditions. Did not establish with Internal medicine as previously recommended.  States difficult to schedule office appointments      ROS   Denies any recent fevers or chills. No nausea or vomiting. No chest pains or shortness of breath.     Allergies   Allergen Reactions   • Benadryl [Altaryl]      \"I get agitated\"   • Nsaids      Bleeding, \"I had a bleeding ulcer\"   • Phenergan [Promethazine Hcl]      \"I get very agitated\"   • Penicillins      \"Had some dizziness\"  Tolerated Unasyn 10/2019       Current medicines (including changes today)  Current Outpatient Medications   Medication Sig Dispense Refill   • HUMIRA PEN 40 MG/0.4ML Pen-injector Kit  "     • morphine (MS IR) 15 MG tablet      • morphine ER (MS CONTIN) 15 MG Tab CR tablet      • zolpidem (AMBIEN) 5 MG Tab Take 1 Tablet by mouth at bedtime as needed for up to 30 days. 30 Tablet 5   • miconazole (MICOTIN) 2 % Cream Apply 1 g topically 2 times a day. 60 g 1   • nystatin (MYCOSTATIN) powder APPLY 1 GRAM TO AFFECTED AREA(S) TOPICALLY FOUR TIMES A DAY 30 g 6   • omeprazole (PRILOSEC) 40 MG delayed-release capsule Take 1 capsule by mouth every day. 30 capsule 6   • betamethasone dipropionate 0.05 % Cream Apply 1 g topically to affected areas twice daily for 14 days. 45 g 11   • furosemide (LASIX) 20 MG Tab Take 1 tablet by mouth 1 time a day as needed (for leg swelling). 30 tablet 1   • XIIDRA 5 % Solution      • losartan (COZAAR) 25 MG Tab Take 1 Tab by mouth every day. 90 Tab 3   • DULoxetine (CYMBALTA) 30 MG Cap DR Particles Take 1 Cap by mouth every day. 30 Cap 2   • ascorbic acid (VITAMIN C) 500 MG tablet Take 1 Tab by mouth 2 times a day, with meals. 60 Tab 0   • ferrous sulfate 325 (65 Fe) MG tablet Take 1 Tab by mouth 2 times a day, with meals. 60 Tab 0   • vitamin D 2000 UNIT Tab Take 1 Tab by mouth every day. 60 Tab    • docusate sodium 100 MG Cap Take 100 mg by mouth 2 Times a Day. 60 Cap    • acetaminophen (TYLENOL) 500 MG Tab Take 500-1,000 mg by mouth every 6 hours as needed.     • zolpidem (AMBIEN) 5 MG Tab      • Naloxone (NARCAN) 4 MG/0.1ML Liquid One spray in one nostril for overdose and call 911. (Patient taking differently: Spray 1 Spray in nose as needed. One spray in one nostril for overdose and call 911.) 1 Package 0     No current facility-administered medications for this visit.       Patient Active Problem List    Diagnosis Date Noted   • Personal history of COVID-19 01/31/2022   • Chronic use of opiate drug for therapeutic purpose 01/18/2021   • Other chronic pain 01/18/2021   • At high risk for falls 12/10/2020   • Localized osteoporosis without current pathological fracture  10/19/2020   • History of osteomyelitis 08/14/2020   • History of immunosuppressive therapy 08/14/2020   • S/P total knee arthroplasty, right 07/27/2020   • Paroxysmal atrial fibrillation ([facility]) 07/23/2020   • Cigarette nicotine dependence without complication 07/21/2020   • Aortic root dilatation ([facility]) 09/13/2019   • Essential hypertension 09/13/2019   • Aortic stenosis, moderate 09/12/2019   • Rheumatoid arthritis ([facility]) 09/08/2015   • Chronic pain of right knee 09/08/2015   • MONICA (obstructive sleep apnea) 03/09/2010   • Mood disorder ([facility]) 05/21/2009   • Anxiety disorder 05/21/2009       Family History   Problem Relation Age of Onset   • Hypertension Mother    • Arthritis Mother    • Depression Mother    • Cancer Father         bladder   • Schizophrenia Father    • Cancer Maternal Grandmother         breast   • Heart Disease Maternal Grandmother    • Psychiatric Illness Other    • Stroke Maternal Aunt    • Other Neg Hx         no connective tissue disorders or known aortic disease       He  has a past medical history of ADD (attention deficit disorder), Alcohol abuse, Allergic rhinitis (5/21/2009), Anemia (09/2019), Anxiety (5/21/2009), Apnea, sleep, Arrhythmia, Back pain (5/21/2009), Bipolar disorder ([facility]), Bleeding ulcer (5/21/2009), Bleeding ulcer (5/21/2009), Bowel habit changes, Daytime sleepiness, Depression (5/21/2009), Eczema (5/21/2009), GERD (gastroesophageal reflux disease), Heart burn, Heart murmur, Hemorrhagic disorder ([facility]), History of bleeding ulcers (2/12/2015), Hypertension (05/03/2021), Insomnia (5/21/2009), Migraine (5/21/2009), Morning headache, Obesity, morbid ([facility]) (5/21/2009), Osteomyelitis ([facility]) (10/7/2019), Pain (09/2019), Pubic ramus fracture ([facility]) (9/28/2019), Rheumatoid arteritis ([facility]) (09/2019), Sleep apnea (5/21/2009), and Snoring. He also has no past medical history of Hyperlipidemia.  He  has a past surgical history that includes gastric bypass laparoscopic (2000); irrigation &  debridement ortho (Left, 10/9/2019); other surgical procedure; other surgical procedure (2019); pr total knee arthroplasty (Right, 7/22/2020); cholecystectomy (2000); tendon transfer (Right, 1/22/2020); and other orthopedic surgery (Right, 2020).       Objective:   Vitals obtained by patient:  There were no vitals taken for this visit.    Physical Exam:  Constitutional: Alert, no distress, well-groomed.  Skin: No rashes in visible areas.  Eye: Round. Conjunctiva clear, lids normal. No icterus.   ENMT: Lips pink without lesions, good dentition, moist mucous membranes. Phonation normal.  Neck: No masses, no thyromegaly. Moves freely without pain.  CV: Pulse as reported by patient  Respiratory: Unlabored respiratory effort, no cough or audible wheeze  Psych: Alert and oriented x3, normal affect and mood.       Assessment and Plan:   The following treatment plan was discussed:     1. Insomnia due to medical condition  Chronic problem for the patient.  Patient does suffer from chronic pain and has multiple core morbidities that interfere with his ADLs and sleep.  There is concern as he is on chronic pain medication as well.  Initially this was temporary however patient continues to report that he does not sleep very well due to his chronic medical conditions.  checked.  Have sent refills for patient.  He will follow back up in 5 to 6 months in the office so we can obtain a UDS.  - zolpidem (AMBIEN) 5 MG Tab; Take 1 Tablet by mouth at bedtime as needed for up to 30 days.  Dispense: 30 Tablet; Refill: 5    2. Personal history of COVID-19  Acute, self-limiting.  Symptoms are stable, no respiratory distress.    3. Multiple comorbid conditions  Continue to recommend following up with internal medicine provider given his multiple comorbidities.      Follow-up: Return in about 6 months (around 7/31/2022) for in office. .    Face to Face Video Visit:     MARTÍN Turner

## 2022-03-15 ENCOUNTER — OFFICE VISIT (OUTPATIENT)
Dept: URGENT CARE | Facility: PHYSICIAN GROUP | Age: 47
End: 2022-03-15
Payer: COMMERCIAL

## 2022-03-15 VITALS
DIASTOLIC BLOOD PRESSURE: 78 MMHG | TEMPERATURE: 96.9 F | HEIGHT: 68 IN | BODY MASS INDEX: 33.77 KG/M2 | HEART RATE: 71 BPM | RESPIRATION RATE: 16 BRPM | OXYGEN SATURATION: 98 % | SYSTOLIC BLOOD PRESSURE: 122 MMHG | WEIGHT: 222.8 LBS

## 2022-03-15 DIAGNOSIS — L97.529 ULCER OF BOTH FEET, UNSPECIFIED ULCER STAGE (HCC): ICD-10-CM

## 2022-03-15 DIAGNOSIS — L97.519 ULCER OF BOTH FEET, UNSPECIFIED ULCER STAGE (HCC): ICD-10-CM

## 2022-03-15 PROCEDURE — 99213 OFFICE O/P EST LOW 20 MIN: CPT | Performed by: FAMILY MEDICINE

## 2022-03-15 RX ORDER — DOXYCYCLINE HYCLATE 100 MG
100 TABLET ORAL 2 TIMES DAILY
Qty: 14 TABLET | Refills: 0 | Status: SHIPPED | OUTPATIENT
Start: 2022-03-15 | End: 2022-03-22

## 2022-03-15 ASSESSMENT — FIBROSIS 4 INDEX: FIB4 SCORE: 1.21

## 2022-03-16 NOTE — PROGRESS NOTES
"  Subjective:      46 y.o. male presents to urgent care for wound to the bottom of his feet bilaterally. He does have an ulcer to the bottom of his right foot which has been present for over a year now, but seems to be worsening.  He has had a chronic ulcer on his left foot, which did heal up but unfortunately 5 to 6 days ago he noticed a blister starting.  Now he has an open wound again with discharge, there is associated pain that is constant, it's described as both throbbing and stabbing, currently rated 7/10. He does take chronic narcotics so he continues with that and Tylenol.  He is already following with podiatry and orthopedics.  No history of diabetes.    He denies any other questions or concerns at this time.    Current problem list, medication, and past medical/surgical history were reviewed in Epic.    ROS  See HPI     Objective:      /78   Pulse 71   Temp 36.1 °C (96.9 °F) (Temporal)   Resp 16   Ht 1.727 m (5' 8\")   Wt 101 kg (222 lb 12.8 oz)   SpO2 98%   BMI 33.88 kg/m²     Physical Exam  Constitutional:       General: He is not in acute distress.     Appearance: He is not diaphoretic.   Cardiovascular:      Rate and Rhythm: Normal rate and regular rhythm.      Heart sounds: Normal heart sounds.   Pulmonary:      Effort: Pulmonary effort is normal. No respiratory distress.      Breath sounds: Normal breath sounds.   Skin:     Comments: Ulcer to the bottom of his right foot is 3 cm x 2 cm.  Circular ulcer on the bottom of his left foot, 1 cm in diameter, with tracking and discharge.   Neurological:      Mental Status: He is alert.   Psychiatric:         Mood and Affect: Affect normal.         Judgment: Judgment normal.       Assessment/Plan:     1. Ulcer of both feet, unspecified ulcer stage (HCC)  Vitals are stable today.  Prescription for doxycycline has been sent.  Urgent referral to wound clinic has been sent.  He has strict ED precautions given the substantial wounds.  - Referral to " Wound Clinic  - doxycycline (VIBRAMYCIN) 100 MG Tab; Take 1 Tablet by mouth 2 times a day for 7 days.  Dispense: 14 Tablet; Refill: 0      Instructed to return to Urgent Care or nearest Emergency Department if symptoms fail to improve, for any change in condition, further concerns, or new concerning symptoms. Patient states understanding of the plan of care and discharge instructions.    Kelsey Olvera M.D.

## 2022-03-25 ENCOUNTER — OFFICE VISIT (OUTPATIENT)
Dept: WOUND CARE | Facility: MEDICAL CENTER | Age: 47
End: 2022-03-25
Attending: FAMILY MEDICINE
Payer: COMMERCIAL

## 2022-03-25 VITALS
DIASTOLIC BLOOD PRESSURE: 82 MMHG | OXYGEN SATURATION: 97 % | SYSTOLIC BLOOD PRESSURE: 116 MMHG | HEART RATE: 67 BPM | TEMPERATURE: 97.3 F | RESPIRATION RATE: 18 BRPM

## 2022-03-25 DIAGNOSIS — M79.671 FOOT PAIN, RIGHT: ICD-10-CM

## 2022-03-25 DIAGNOSIS — L97.522 CHRONIC FOOT ULCER WITH FAT LAYER EXPOSED, LEFT (HCC): ICD-10-CM

## 2022-03-25 DIAGNOSIS — Z92.25 HISTORY OF IMMUNOSUPPRESSIVE THERAPY: Chronic | ICD-10-CM

## 2022-03-25 DIAGNOSIS — M06.371 RHEUMATOID NODULE, RIGHT ANKLE AND FOOT (HCC): ICD-10-CM

## 2022-03-25 DIAGNOSIS — M79.672 FOOT PAIN, LEFT: ICD-10-CM

## 2022-03-25 DIAGNOSIS — M06.372 RHEUMATOID NODULE, LEFT ANKLE AND FOOT (HCC): ICD-10-CM

## 2022-03-25 DIAGNOSIS — L97.512 CHRONIC FOOT ULCER, RIGHT, WITH FAT LAYER EXPOSED (HCC): ICD-10-CM

## 2022-03-25 PROCEDURE — 11042 DBRDMT SUBQ TIS 1ST 20SQCM/<: CPT

## 2022-03-25 PROCEDURE — 99214 OFFICE O/P EST MOD 30 MIN: CPT | Mod: 25 | Performed by: NURSE PRACTITIONER

## 2022-03-25 PROCEDURE — 99213 OFFICE O/P EST LOW 20 MIN: CPT

## 2022-03-25 PROCEDURE — 11042 DBRDMT SUBQ TIS 1ST 20SQCM/<: CPT | Performed by: NURSE PRACTITIONER

## 2022-03-25 RX ORDER — DOXYCYCLINE HYCLATE 100 MG
100 TABLET ORAL 2 TIMES DAILY
COMMUNITY
End: 2022-07-01

## 2022-03-25 ASSESSMENT — ENCOUNTER SYMPTOMS
SHORTNESS OF BREATH: 0
DEPRESSION: 1
BACK PAIN: 1
NERVOUS/ANXIOUS: 1
DIARRHEA: 0
COUGH: 0
FEVER: 0
NAUSEA: 0
CHILLS: 0
CONSTIPATION: 1
VOMITING: 0

## 2022-03-25 NOTE — PROGRESS NOTES
Patient given [employer] prescription for offloading shoes. Patient also given two tubigrips  (size G), patient states he will apply the tubigrips tomorrow morning.    Advised patient to have his foot xrays completed before his next appointment if possible.

## 2022-03-25 NOTE — PROGRESS NOTES
Provider Encounter-full thickness wound      HISTORY OF PRESENT ILLNESS  Wound History:    START OF CARE IN CLINIC: 3/25/2022    REFERRING PROVIDER: Kelsey Olvera     WOUND-full-thickness ulcer   LOCATION: Right plantar/lateral midfoot                                   left plantar/lateral midfoot   HISTORY: Patient with history of RA referred to [facility] for evaluation treatment of bilateral plantar foot ulcers.  States that the right plantar foot ulcer started in early 2021, is a small wound which gradually became bigger.  He has another ulcer to his left foot which healed at one time, but reopened in early March of this year.  He has been seeing a podiatrist, who was told him that the ulcers are due to RA nodules.  He was at one point referred to Dr. Pryor at [facility], who told him that his podiatrist would need to remove the nodules.  Podiatry states that that is not something she would do, has referred him back to [facility].  He does not have offloading footwear, was told that because he is not diabetic, his insurance will not cover.    Pertinent Medical History: Rheumatoid arthritis, history of osteomyelitis, immunosuppressive therapy, anxiety disorder, MONICA    N  TOBACCO USE:   Current everyday smoker    Patient's problem list, allergies, and current medications reviewed and updated in Epic    Interval History:  3/25/2022 Initial clinic visit with Malathi Colindres, MAGNUS, FNANDI-BC, CWHARDEEPN, CFNIMESH.  Patient states he is in his usual state of health, has chronic pain issues.  Frustrated with lack of progress in these wounds.  He has not yet called [facility] to reschedule appointment with Dr. Pryor.  Has not had x-rays done in over 6 months.  He presents today wearing regular shoes, was told that his insurance would not cover offloading shoes because he is not diabetic.  He is on his feet a lot, as he takes care of his 2 young sons at home.  Healing of these wounds will be extremely difficult without proper offloading.  I provided  patient with an Rx for offloading shoes, advised him to go to [facility] to see if they could work out a payment plan, or provide an affordable option.      REVIEW OF SYSTEMS:   Review of Systems   Constitutional: Negative for chills and fever.   Respiratory: Negative for cough and shortness of breath.    Cardiovascular: Negative for chest pain.   Gastrointestinal: Positive for constipation. Negative for diarrhea, nausea and vomiting.        Occasional constipation, due to narcotic use   Musculoskeletal: Positive for back pain and joint pain.        Long history of RA   Skin: Negative for rash.   Neurological:        Reports numbness in his toes, normal sensation in other parts of his feet   Psychiatric/Behavioral: Positive for depression. The patient is nervous/anxious.         States that he is both depressed and anxious regarding his health issues.  He is receiving treatment and counseling       PHYSICAL EXAMINATION:   /82   Pulse 67   Temp 36.3 °C (97.3 °F)   Resp 18   SpO2 97%     Physical Exam  Constitutional:       Appearance: He is obese.   HENT:      Head: Normocephalic.   Eyes:      Pupils: Pupils are equal, round, and reactive to light.   Cardiovascular:      Rate and Rhythm: Normal rate.      Comments: Pedal pulses palpable  Pulmonary:      Effort: Pulmonary effort is normal.   Musculoskeletal:      Right lower leg: Edema present.      Left lower leg: Edema present.      Comments: Charcot deformities of both feet  RA changes to hands and feet  Pitting edema bilateral lower extremities   Skin:     Comments: Full-thickness ulcers to bilateral plantar/lateral feet with periwound callus  Refer to wound flowsheet and photos   Neurological:      General: No focal deficit present.      Mental Status: He is alert and oriented to person, place, and time.   Psychiatric:         Mood and Affect: Mood normal.         Behavior: Behavior normal.         Thought Content: Thought content normal.          Judgment: Judgment normal.         WOUND ASSESSMENT  Wound 03/25/22 Full Thickness Wound Foot Left --Left Plantar Mid Foot (Active)   Wound Image    03/25/22 1115   Site Assessment Pink;Yellow 03/25/22 1115   Periwound Assessment Maceration 03/25/22 1115   Margins Unattached edges 03/25/22 1115   Drainage Amount Large 03/25/22 1115   Drainage Description Serous 03/25/22 1115   Treatments Cleansed;Topical Lidocaine;Provider debridement 03/25/22 1115   Wound Cleansing Normal Saline Irrigation 03/25/22 1115   Periwound Protectant Skin Protectant Wipes to Periwound;Barrier Paste 03/25/22 1115   Dressing Cleansing/Solutions Not Applicable 03/25/22 1115   Dressing Options Hydrofiber Silver;Nonadhesive Foam;Komprex Horseshoe;Hypafix Tape 03/25/22 1115   Dressing Changed New 03/25/22 1115   Dressing Change/Treatment Frequency Every 72 hrs, and As Needed 03/25/22 1115   Non-staged Wound Description Full thickness 03/25/22 1115   Wound Length (cm) 0.9 cm 03/25/22 1115   Wound Width (cm) 1 cm 03/25/22 1115   Wound Depth (cm) 0.3 cm 03/25/22 1115   Wound Surface Area (cm^2) 0.9 cm^2 03/25/22 1115   Wound Volume (cm^3) 0.27 cm^3 03/25/22 1115   Post-Procedure Length (cm) 1.4 cm 03/25/22 1115   Post-Procedure Width (cm) 1.7 cm 03/25/22 1115   Post-Procedure Depth (cm) 0.3 cm 03/25/22 1115   Post-Procedure Surface Area (cm^2) 2.38 cm^2 03/25/22 1115   Post-Procedure Volume (cm^3) 0.714 cm^3 03/25/22 1115   Tunneling (cm) 2.5 cm 03/25/22 1115   Tunneling Clock Position of Wound 3 03/25/22 1115   Wound Odor None 03/25/22 1115   Pulses DP;1+ 03/25/22 1115   Exposed Structures None 03/25/22 1115   Number of days: 0       Wound 03/25/22 Full Thickness Wound Foot Right --Right Plantar Mid Foot (Active)   Wound Image    03/25/22 1115   Site Assessment Red;Yellow 03/25/22 1115   Periwound Assessment Maceration 03/25/22 1115   Margins Attached edges 03/25/22 1115   Drainage Amount Large 03/25/22 1115   Drainage Description Serous 03/25/22  1115   Treatments Cleansed;Topical Lidocaine;Provider debridement 03/25/22 1115   Wound Cleansing Normal Saline Irrigation 03/25/22 1115   Periwound Protectant Skin Protectant Wipes to Periwound;Barrier Paste 03/25/22 1115   Dressing Cleansing/Solutions Not Applicable 03/25/22 1115   Dressing Options Hydrofiber Silver;Nonadhesive Foam;Komprex Horseshoe;Hypafix Tape 03/25/22 1115   Dressing Changed New 03/25/22 1115   Dressing Change/Treatment Frequency Every 72 hrs, and As Needed 03/25/22 1115   Non-staged Wound Description Full thickness 03/25/22 1115   Wound Length (cm) 1.9 cm 03/25/22 1115   Wound Width (cm) 2 cm 03/25/22 1115   Wound Depth (cm) 0.1 cm 03/25/22 1115   Wound Surface Area (cm^2) 3.8 cm^2 03/25/22 1115   Wound Volume (cm^3) 0.38 cm^3 03/25/22 1115   Post-Procedure Length (cm) 2 cm 03/25/22 1115   Post-Procedure Width (cm) 2.1 cm 03/25/22 1115   Post-Procedure Depth (cm) 0.1 cm 03/25/22 1115   Post-Procedure Surface Area (cm^2) 4.2 cm^2 03/25/22 1115   Post-Procedure Volume (cm^3) 0.42 cm^3 03/25/22 1115   Tunneling (cm) 0 cm 03/25/22 1115   Undermining (cm) 0 cm 03/25/22 1115   Wound Odor None 03/25/22 1115   Pulses DP;1+ 03/25/22 1115   Exposed Structures None 03/25/22 1115   Number of days: 0         PROCEDURE:   -2% viscous lidocaine applied topically to wound beds for approximately 5 minutes prior to debridement  -Curette used to debride periwound callus to skin level.  Total area of callus.  Approximately 1.5 cm²  -Curette then used to debride wound beds and periwound callus.  Excisional debridement was performed to remove devitalized tissue until healthy, bleeding tissue was visualizeds.   Entire surface of wound, 6.58 cm2 debrided.  Tissue debrided into the subcutaneous layer.    -Bleeding controlled with manual pressure.    -Wound care completed by wound RN, refer to flowsheet  -Patient tolerated the procedure well, without c/o pain or discomfort.     Pertinent Labs and Diagnostics:    Labs:  N/A    IMAGING: Foot/Toes Imaging, Past Year DX-TOE(S) 2+ LEFT    Result Date: 8/18/2021  Narrative 8/18/2021 1:57 PM HISTORY/REASON FOR EXAM:  Pain/Deformity Following Trauma. TECHNIQUE/EXAM DESCRIPTION AND NUMBER OF VIEWS:  3 views of the LEFT toes. COMPARISON: January 1 FINDINGS: There is osteopenia in this decreases sensitivity of the study for the detection of fracture New first proximal phalanx base lucency seen medially extends to the metatarsal-phalangeal articular margin Third and fourth metatarsal neck fractures have healed with slight valgus angulation. Probable chronic fifth metatarsal head bunionectomy laterally, unchanged No subluxation     Impression Acute-subacute first proximal phalanx intra-articular base fracture is nondisplaced Healed third and fourth metatarsal neck fractures Severe osteopenia      VASCULAR STUDIES: No results found.     LAST  WOUND CULTURE:   Lab Results   Component Value Date/Time    CULTRSULT - (A) 05/04/2021 02:20 PM    CULTRSULT Serratia marcescens  Light growth   (A) 05/04/2021 02:20 PM         ASSESSMENT AND PLAN:     1. Chronic foot ulcer with fat layer exposed, left (HCC); Chronic foot ulcer, right, with fat layer exposed (HCC)  Comments: Right plantar foot wound present for over a year, left plantar foot ulcer reopened in early March 2022    3/25/2022: Periwound maceration around both ulcers.  Healing complicated by inadequate offloading, and structural changes of patient's feet.  -Excisional debridement of wounds in clinic today, medically necessary to promote wound healing.  -Rx for offloading shoes provided.  Advised patient to go to [facility] to see if they can provide him with an affordable option, or arrange for pain meds  -Patient to return to clinic weekly for assessment and debridement  -Referral to home health  -Home health to change dressing 1-2 times per week in between clinic visits    Wound care: Silver Hydrofiber to manage exudate and bioburden,  foam cover dressing, foam horseshoe around ulcer to provide some offloading, Hypafix tape    2. Rheumatoid nodule, left ankle and foot (HCC); Rheumatoid nodule, right ankle and foot (HCC)\  Comments: Per patient, ulcers caused by underlying nodules.  Podiatrist does not want to operate, has referred patient back to [facility]    3/25/2022: Patient states he was seen by Dr. Pryor about 18 months ago, who told him to go back to his podiatrist.  Podiatrist states she does not do necessary procedure, referred him back to [facility]  -X-rays of both feet, with weightbearing views, ordered  -Encouraged patient to call and schedule appointment at [facility] as soon as possible      3.  History of immunosuppressive therapy  Comments: Long history of immunosuppressive therapy due to RA.  Complicating factor.          PATIENT EDUCATION:  - Implications of loss of protective sensation (LOPS) discussed with patient- including increased risk for amputation.  - Advised to check feet at least daily, moisturize feet, and to always wear protective foot wear.   -  Importance of offloading foot to assist with wound healing  - Advised pt not to trim nails or calluses, seek foot/nail care from podiatrist or certified foot/nail nurse  - Importance of adequate nutrition for wound healing    My total time spent caring for the patient on the day of the encounter was 30 minutes.   This does not include time spent on separately billable procedures/tests.      Please note that this note may have been created using voice recognition software. I have worked with technical experts from [facility] to optimize the interface.  I have made every reasonable attempt to correct obvious errors, but there may be errors of grammar and possibly content that I did not discover before finalizing the note.

## 2022-03-25 NOTE — PATIENT INSTRUCTIONS
-Keep dressings clean and dry. Change dressings once between wound clinic visits, and if they become over saturated, soiled or fall off.     -Avoid prolonged standing or sitting without elevating your legs.    -Should you experience any significant changes in your wounds, such as signs of infection (increasing redness, swelling, localized heat, increased pain, fever > 101 F, chills) or have any questions regarding your home care instructions, please contact the wound center at [phone]. If after hours, contact your primary care physician or go to the hospital emergency room.     -If you are 5 or more minutes late for an appointment, we reserve the right to cancel and reschedule that appointment. Additionally, if you are habitually late or not showing (3 late cancellations and/or no shows), we reserve the right to cancel your remaining appointments and it will be your responsibility to obtain a new referral if services are still needed.

## 2022-03-28 ENCOUNTER — TELEPHONE (OUTPATIENT)
Dept: WOUND CARE | Facility: MEDICAL CENTER | Age: 47
End: 2022-03-28
Payer: COMMERCIAL

## 2022-03-28 NOTE — TELEPHONE ENCOUNTER
showered and removed dressings and didn't have the supplies to reapply dressings to wounds. Awaiting [facility] order or home health. Aquacel Ag, non ad foam and hypafix tape supplied to change dressings.

## 2022-03-29 ENCOUNTER — TELEPHONE (OUTPATIENT)
Dept: WOUND CARE | Facility: MEDICAL CENTER | Age: 47
End: 2022-03-29
Payer: COMMERCIAL

## 2022-04-01 ENCOUNTER — APPOINTMENT (OUTPATIENT)
Dept: RADIOLOGY | Facility: IMAGING CENTER | Age: 47
End: 2022-04-01
Payer: COMMERCIAL

## 2022-04-01 ENCOUNTER — APPOINTMENT (OUTPATIENT)
Dept: RADIOLOGY | Facility: IMAGING CENTER | Age: 47
End: 2022-04-01
Attending: NURSE PRACTITIONER
Payer: COMMERCIAL

## 2022-04-01 ENCOUNTER — OFFICE VISIT (OUTPATIENT)
Dept: WOUND CARE | Facility: MEDICAL CENTER | Age: 47
End: 2022-04-01
Attending: FAMILY MEDICINE
Payer: COMMERCIAL

## 2022-04-01 ENCOUNTER — OFFICE VISIT (OUTPATIENT)
Dept: URGENT CARE | Facility: PHYSICIAN GROUP | Age: 47
End: 2022-04-01
Payer: COMMERCIAL

## 2022-04-01 VITALS
RESPIRATION RATE: 16 BRPM | TEMPERATURE: 98.1 F | HEART RATE: 82 BPM | DIASTOLIC BLOOD PRESSURE: 76 MMHG | BODY MASS INDEX: 35.16 KG/M2 | SYSTOLIC BLOOD PRESSURE: 128 MMHG | WEIGHT: 232 LBS | OXYGEN SATURATION: 99 % | HEIGHT: 68 IN

## 2022-04-01 DIAGNOSIS — S99.922A FOOT INJURY, LEFT, INITIAL ENCOUNTER: ICD-10-CM
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
## 2022-04-01 DIAGNOSIS — L97.522 CHRONIC FOOT ULCER WITH FAT LAYER EXPOSED, LEFT (HCC): ICD-10-CM

## 2022-04-01 DIAGNOSIS — L97.512 CHRONIC FOOT ULCER, RIGHT, WITH FAT LAYER EXPOSED (HCC): ICD-10-CM

## 2022-04-01 DIAGNOSIS — M06.371 RHEUMATOID NODULE, RIGHT ANKLE AND FOOT (HCC): ICD-10-CM

## 2022-04-01 DIAGNOSIS — M79.671 FOOT PAIN, RIGHT: ICD-10-CM

## 2022-04-01 DIAGNOSIS — M79.672 LEFT FOOT PAIN: ICD-10-CM

## 2022-04-01 PROCEDURE — 73630 X-RAY EXAM OF FOOT: CPT | Mod: TC,FY,RT | Performed by: RADIOLOGY

## 2022-04-01 PROCEDURE — 99214 OFFICE O/P EST MOD 30 MIN: CPT

## 2022-04-01 PROCEDURE — 73630 X-RAY EXAM OF FOOT: CPT | Mod: TC,FY,LT

## 2022-04-01 ASSESSMENT — ENCOUNTER SYMPTOMS
CARDIOVASCULAR NEGATIVE: 1
GASTROINTESTINAL NEGATIVE: 1
NEUROLOGICAL NEGATIVE: 1
EYES NEGATIVE: 1
PSYCHIATRIC NEGATIVE: 1
RESPIRATORY NEGATIVE: 1
CONSTITUTIONAL NEGATIVE: 1
FALLS: 1

## 2022-04-01 ASSESSMENT — FIBROSIS 4 INDEX: FIB4 SCORE: 1.21

## 2022-04-01 NOTE — PROGRESS NOTES
"Subjective     Richard Hubbard II is a 46 y.o. male who presents with Foot Injury (LT foot pain )        HPI     Patient has chronic foot ulcer and has been going [facility]. Last night, he was taking the trash out from the garage when he tripped on one of his kid's bikes. He reports falling and hitting his left knee on the ground. He tried getting up and \"dragged\" his leg. He reports that upon waking up, he felt pain on his left foot, sharp pain, 6/10, aggravated by movement and weight bearing.  He denies any new weakness, or new numbness in his lower extremities.    PMH:  has a past medical history of ADD (attention deficit disorder), Alcohol abuse, Allergic rhinitis (5/21/2009), Anemia (09/2019), Anxiety (5/21/2009), Apnea, sleep, Arrhythmia, Back pain (5/21/2009), Bipolar disorder ([facility]), Bleeding ulcer (5/21/2009), Bleeding ulcer (5/21/2009), Bowel habit changes, Daytime sleepiness, Depression (5/21/2009), Eczema (5/21/2009), GERD (gastroesophageal reflux disease), Heart burn, Heart murmur, Hemorrhagic disorder ([facility]), History of bleeding ulcers (2/12/2015), Hypertension (05/03/2021), Insomnia (5/21/2009), Migraine (5/21/2009), Morning headache, Obesity, morbid ([facility]) (5/21/2009), Osteomyelitis ([facility]) (10/7/2019), Pain (09/2019), Pubic ramus fracture ([facility]) (9/28/2019), Rheumatoid arteritis ([facility]) (09/2019), Sleep apnea (5/21/2009), and Snoring.    He has no past medical history of Hyperlipidemia.  MEDS:   Current Outpatient Medications:   •  miconazole (MICOTIN) 2 % Cream, APPLY 1 GRAM TOPICALLY TWO TIMES A DAY, Disp: 60 g, Rfl: 1  •  doxycycline (VIBRAMYCIN) 100 MG Tab, Take 100 mg by mouth 2 times a day., Disp: , Rfl:   •  HUMIRA PEN 40 MG/0.4ML Pen-injector Kit, , Disp: , Rfl:   •  morphine (MS IR) 15 MG tablet, , Disp: , Rfl:   •  morphine ER (MS CONTIN) 15 MG Tab CR tablet, , Disp: , Rfl:   •  nystatin (MYCOSTATIN) powder, APPLY 1 GRAM TO AFFECTED AREA(S) TOPICALLY FOUR TIMES A DAY, Disp: 30 " "g, Rfl: 6  •  omeprazole (PRILOSEC) 40 MG delayed-release capsule, Take 1 capsule by mouth every day., Disp: 30 capsule, Rfl: 6  •  betamethasone dipropionate 0.05 % Cream, Apply 1 g topically to affected areas twice daily for 14 days., Disp: 45 g, Rfl: 11  •  furosemide (LASIX) 20 MG Tab, Take 1 tablet by mouth 1 time a day as needed (for leg swelling)., Disp: 30 tablet, Rfl: 1  •  zolpidem (AMBIEN) 5 MG Tab, , Disp: , Rfl:   •  XIIDRA 5 % Solution, , Disp: , Rfl:   •  losartan (COZAAR) 25 MG Tab, Take 1 Tab by mouth every day., Disp: 90 Tab, Rfl: 3  •  DULoxetine (CYMBALTA) 30 MG Cap DR Particles, Take 1 Cap by mouth every day., Disp: 30 Cap, Rfl: 2  •  ascorbic acid (VITAMIN C) 500 MG tablet, Take 1 Tab by mouth 2 times a day, with meals., Disp: 60 Tab, Rfl: 0  •  ferrous sulfate 325 (65 Fe) MG tablet, Take 1 Tab by mouth 2 times a day, with meals., Disp: 60 Tab, Rfl: 0  •  vitamin D 2000 UNIT Tab, Take 1 Tab by mouth every day., Disp: 60 Tab, Rfl:   •  docusate sodium 100 MG Cap, Take 100 mg by mouth 2 Times a Day., Disp: 60 Cap, Rfl:   •  acetaminophen (TYLENOL) 500 MG Tab, Take 500-1,000 mg by mouth every 6 hours as needed., Disp: , Rfl:   •  Naloxone (NARCAN) 4 MG/0.1ML Liquid, One spray in one nostril for overdose and call 911. (Patient taking differently: Administer 1 Spray into affected nostril(S) as needed. One spray in one nostril for overdose and call 911.), Disp: 1 Package, Rfl: 0  ALLERGIES:   Allergies   Allergen Reactions   • Benadryl [Altaryl]      \"I get agitated\"   • Nsaids      Bleeding, \"I had a bleeding ulcer\"   • Phenergan [Promethazine Hcl]      \"I get very agitated\"   • Penicillins      \"Had some dizziness\"  Tolerated Unasyn 10/2019     SURGHX:   Past Surgical History:   Procedure Laterality Date   • PB TOTAL KNEE ARTHROPLASTY Right 7/22/2020    Procedure: ARTHROPLASTY, KNEE, TOTAL;  Surgeon: Temo Pires M.D.;  Location: SURGERY [facility];  Service: Orthopedics   • TENDON " "TRANSFER Right 1/22/2020    Procedure: RIGHT DISTAL ULNA RESECTION AND EXTENSOR TENDON TRANSFER;  Surgeon: Marine Rushing M.D.;  Location: SURGERY [facility];  Service: Orthopedics   • OTHER ORTHOPEDIC SURGERY Right 2020    total right knee replacement   • IRRIGATION & DEBRIDEMENT ORTHO Left 10/9/2019    Procedure: IRRIGATION AND DEBRIDEMENT, WOUND - PELVIC OSTEOMYELITIS;  Surgeon: You Olivares M.D.;  Location: SURGERY [facility];  Service: Orthopedics   • OTHER SURGICAL PROCEDURE  2019    osteomelitis of pelvis cleaned   • GASTRIC BYPASS LAPAROSCOPIC  2000    Lucila en y   • CHOLECYSTECTOMY  2000   • OTHER SURGICAL PROCEDURE      repair of broken penis     SOCHX:  reports that he has been smoking cigarettes and cigars. He has a 0.75 pack-year smoking history. He has never used smokeless tobacco. He reports that he does not drink alcohol and does not use drugs.  FH: Reviewed with patient, not pertinent to this visit.     Review of Systems   Constitutional: Negative.    HENT: Negative.    Eyes: Negative.    Respiratory: Negative.    Cardiovascular: Negative.    Gastrointestinal: Negative.    Genitourinary: Negative.    Musculoskeletal: Positive for falls and joint pain.   Skin: Negative.    Neurological: Negative.    Psychiatric/Behavioral: Negative.         Objective     /76   Pulse 82   Temp 36.7 °C (98.1 °F) (Temporal)   Resp 16   Ht 1.727 m (5' 8\")   Wt 105 kg (232 lb)   SpO2 99%   BMI 35.28 kg/m²      Physical Exam  Constitutional:       Appearance: Normal appearance.   HENT:      Head: Normocephalic.   Eyes:      Extraocular Movements: Extraocular movements intact.   Cardiovascular:      Rate and Rhythm: Normal rate and regular rhythm.      Heart sounds: Murmur heard.   Pulmonary:      Effort: Pulmonary effort is normal.      Breath sounds: Normal breath sounds.   Musculoskeletal:         General: Swelling and signs of injury present.      Cervical back: Normal range of motion. "      Right lower leg: Edema present.      Left lower leg: Edema present.   Skin:     General: Skin is warm and dry.   Neurological:      General: No focal deficit present.      Mental Status: He is alert.   Psychiatric:         Mood and Affect: Mood normal.         Behavior: Behavior normal.     X-ray left foot:    FINDINGS:  Bones are osteopenic.     There is diffuse soft tissue swelling of the left ankle and foot. There is no focal soft tissue gas to suggest an abscess.     There is no definite periostitis.     There is no acute fracture. The fracture at the base of the 1st proximal phalanx on the medial side has healed during the interval.     The alignment is unchanged. There is flat foot deformity again noted on the lateral view.     There is multifocal degenerative change.     IMPRESSION:     1.  There is no acute displaced fracture of the left foot.  2.  Interval healing of the base of 1st proximal phalanx fracture.  3.  Chronic deformities of the midfoot with flatfoot deformity.  4.  Diffuse soft tissue swelling with no plain film evidence of soft tissue gas or osteomyelitis.    Assessment & Plan     1. Foot injury, left, initial encounter    - DX-FOOT-COMPLETE 3+ LEFT; Future    2. Fall, initial encounter    - DX-FOOT-COMPLETE 3+ LEFT; Future    3. Left foot pain    Patient is here today with history of fall and injury to his left foot.  He has chronic ulcer on his left foot and has been following up with [facility] wound care center.  Discussed x-ray results with patient, explained that there is no acute fracture.  Patient got upset, states that he is in pain so there is definitely something else going on there.  Explained to the patient that he must have strained his left foot in the process of falling.  This got him more upset.  Explained again that x-ray results showed previous fracture is healing.  Patient questioned the radiologist reading of healing fracture on the first proximal phalanx, states that it  is concerning that the fracture has not totally healed from August.  I explained to the patient that everyone heals differently, depending on different factors including but not limited to age, chronic conditions, etc.  Patient became more condescending, mentioned that his wife is a [facility] and also a nurse practitioner.  Patient  insisted to have a boot on the left foot, even after explaining this would not be beneficial. He continued to state that he has to take care of 2 small kids.  Once MA brought the short boot, he got more upset as he is unable to get his swollen foot and it.  He declined Toradol IM during this visit.  Offered referral to Ortho, which he also refused.     Instructed to return to clinic or nearest emergency department for any change in condition, further concerns, or worsening of symptoms.    Electronically Signed by MAGNUS Tubbs

## 2022-04-21 NOTE — REHAB-NURSING IDT TEAM NOTE
04/21/22p.earnest Srivastava was checked out to me by Dr. Joanne Alexis. Please see his/her initial documentation for details of the patient's ED presentation, physical exam and completed studies. In brief, Candice Srivastava is a 60 y.o. female that presents with anxiety nausea vomiting. Awaiting labs imaging.     I have reviewed and interpreted all of the currently available lab results from this visit (if applicable):  Results for orders placed or performed during the hospital encounter of 04/21/22   COVID-19, Rapid    Specimen: Nasopharyngeal   Result Value Ref Range    SARS-CoV-2, NAAT NOT DETECTED NOT DETECTED   Rapid Flu Swab    Specimen: Nasopharyngeal   Result Value Ref Range    Rapid Influenza A Ag NEGATIVE NEGATIVE    Rapid Influenza B Ag NEGATIVE NEGATIVE   CBC with Auto Differential   Result Value Ref Range    WBC 14.2 (H) 4.0 - 10.5 K/CU MM    RBC 4.40 4.2 - 5.4 M/CU MM    Hemoglobin 12.2 (L) 12.5 - 16.0 GM/DL    Hematocrit 37.2 37 - 47 %    MCV 84.5 78 - 100 FL    MCH 27.7 27 - 31 PG    MCHC 32.8 32.0 - 36.0 %    RDW 11.6 (L) 11.7 - 14.9 %    Platelets 645 894 - 730 K/CU MM    MPV 9.8 7.5 - 11.1 FL    Differential Type AUTOMATED DIFFERENTIAL     Segs Relative 69.2 (H) 36 - 66 %    Lymphocytes % 18.4 (L) 24 - 44 %    Monocytes % 9.8 (H) 0 - 4 %    Eosinophils % 1.8 0 - 3 %    Basophils % 0.4 0 - 1 %    Segs Absolute 9.9 K/CU MM    Lymphocytes Absolute 2.6 K/CU MM    Monocytes Absolute 1.4 K/CU MM    Eosinophils Absolute 0.3 K/CU MM    Basophils Absolute 0.1 K/CU MM    Nucleated RBC % 0.0 %    Total Nucleated RBC 0.0 K/CU MM    Total Immature Neutrophil 0.05 K/CU MM    Immature Neutrophil % 0.4 0 - 0.43 %   Comprehensive Metabolic Panel   Result Value Ref Range    Sodium 136 135 - 145 MMOL/L    Potassium 3.8 3.5 - 5.1 MMOL/L    Chloride 100 99 - 110 mMol/L    CO2 23 21 - 32 MMOL/L    BUN 8 6 - 23 MG/DL    CREATININE 0.5 (L) 0.6 - 1.1 MG/DL    Glucose 146 (H) 70 - 99 MG/DL    Calcium 9.2 8.3 - 10.6 MG/DL Nursing   Nursing  Diet/Nutrition:  Regular and Thin Liquids  Medication Administration:  Whole with Liquid Wash  % consumed at meals in last 24 hours:  Consumed % of meals per documentation.  Meal/Snack Consumption for the past 24 hrs:   Oral Nutrition   10/21/19 1400 Between 50-75% Consumed   10/21/19 0900 Between % Consumed     Snack schedule:  None  Appetite:  Good  Admit Weight:  Weight: 91.6 kg (201 lb 15.1 oz)  Weight Last 7 Days   [91.6 kg (201 lb 15.1 oz)] 91.6 kg (201 lb 15.1 oz) (10/17 1130)    Pain Issues:    Location:  Pelvis (10/22 0602)  Left;Mid (10/22 0602)         Severity:  Moderate   Scheduled pain medications:  morphine SR (MS CONTIN)  + Tylenol + Lyrica    PRN pain medications used in last 24 hours:  hydrocodone/acetaminophen (NORCO)  and oxycodone immediate release (ROXICODONE)    Non Pharmacologic Interventions:  distraction, emotional support, relaxation, repositioned and rest  Effectiveness of pain management plan:  fair=improved comfort with interventions but does not always meet goal    Bowel:    Bowel Assist Initial Score:  3 - Moderate Assistance  Bowel Assist Current Score:  3 - Moderate Assistance  Bowl Accidents in last 7 days:     Last bowel movement: 10/21/19(per pt)  Stool Description: Large, Brown, Formed     Usual bowel pattern:  daily  Scheduled bowel medications:  senna-docusate (PERICOLACE or SENOKOT S)   PRN bowel medications used in last 24 hours:  None  Nursing Interventions:  Increased time, PRN medication, Scheduled medication, Supervision, Verbal cueing, Positioning on commode/toilet  Effectiveness of bowel program:   good=regular, predictable, controlled emptying of bowel     Bladder:    Bladder Assist Initial Score:  4 - Minimal Assistance  Bladder Assist Current Score:  5 - Standby Prompting/Supervision or Set-up  Bladder Accidents in last 7 days:  0  Medications affecting bladder:  None    Time void schedule/voiding pattern:  Voiding every 2-4  Albumin 4.0 3.4 - 5.0 GM/DL    Total Protein 6.5 6.4 - 8.2 GM/DL    Total Bilirubin 0.4 0.0 - 1.0 MG/DL    ALT 24 10 - 40 U/L    AST 19 15 - 37 IU/L    Alkaline Phosphatase 99 40 - 129 IU/L    GFR Non-African American >60 >60 mL/min/1.73m2    GFR African American >60 >60 mL/min/1.73m2    Anion Gap 13 4 - 16   Troponin   Result Value Ref Range    Troponin T <0.010 <0.01 NG/ML   Brain Natriuretic Peptide   Result Value Ref Range    Pro-BNP 90.67 <300 PG/ML   Blood Gas, Venous   Result Value Ref Range    pH, Barry 7.32 7.32 - 7.42    pCO2, Barry 44 38 - 52 mmHG    pO2, Barry 36 28 - 48 mmHG    Base Excess 4 (H) 0 - 2.4    HCO3, Venous 22.7 19 - 25 MMOL/L    O2 Sat, Barry 67.8 50 - 70 %    Comment VBG    Urinalysis   Result Value Ref Range    Color, UA YELLOW (A) YELLOW    Clarity, UA CLEAR CLEAR    Glucose, Urine 500 (A) NEGATIVE MG/DL    Bilirubin Urine NEGATIVE NEGATIVE MG/DL    Ketones, Urine 15 (A) NEGATIVE MG/DL    Specific Gravity, UA 1.020 1.001 - 1.035    Blood, Urine NEGATIVE NEGATIVE    pH, Urine 6.0 5.0 - 8.0    Protein, UA NEGATIVE NEGATIVE MG/DL    Urobilinogen, Urine 0.2 0.2 - 1.0 MG/DL    Nitrite Urine, Quantitative NEGATIVE NEGATIVE    Leukocyte Esterase, Urine TRACE (A) NEGATIVE    RBC, UA 1 0 - 6 /HPF    WBC, UA 7 (H) 0 - 5 /HPF    Bacteria, UA NEGATIVE NEGATIVE /HPF    Squam Epithel, UA 1 /HPF    Trichomonas, UA NONE SEEN NONE SEEN /HPF   Troponin   Result Value Ref Range    Troponin T <0.010 <0.01 NG/ML   EKG 12 Lead   Result Value Ref Range    Ventricular Rate 80 BPM    Atrial Rate 80 BPM    P-R Interval 170 ms    QRS Duration 112 ms    Q-T Interval 398 ms    QTc Calculation (Bazett) 459 ms    P Axis 24 degrees    R Axis -42 degrees    T Axis 53 degrees    Diagnosis       Normal sinus rhythm  Left axis deviation  Incomplete left bundle branch block  Left ventricular hypertrophy with repolarization abnormality  Abnormal ECG  When compared with ECG of 30-MAR-2022 05:59,  Incomplete left bundle branch block hours  Interventions:  Increased time, Emptying of device, Urinal, Time void patient initiated  Effectiveness of bladder training:  Good=regular, predictable, emptying of bladder, patient initiates time voiding    Wound:       Abdominal Incision - approximated with staples and dressed with dry gauze and tegaderm.         Interventions:  Changing dressing as needed and monitoring for s/s of infection.  Effectiveness of intervention:  wound is unchanged     Sleep/wake cycle:   Average hours slept:  Sleeps 3-4 hours without waking  Sleep medication usage:  Other PRN Trazodone 50mg    Patient/Family Training/Education:  Fall Prevention, General Self Care, Medication Management, Pain Management, Safe Transfers, Safety, Skin Care and Wound Care  Discharge Supply Recommendations:  Blood Pressure Monitor and Wound Care Supplies  Strengths: Alert and oriented, Willingly participates in therapeutic activities, Able to follow instructions, Supportive family, Pleasant and cooperative, Effective communication skills, Good carryover of learning and Motivated for self care and independence   Barriers:   Limited mobility       Nursing Problems     Problem: Bowel/Gastric:     Description:     Goal: Normal bowel function is maintained or improved     Description:           Goal: Will not experience complications related to bowel motility     Description:                 Problem: Discharge Barriers/Planning     Description:     Goal: Patient's continuum of care needs will be met     Description:                 Problem: Infection     Description:     Goal: Will remain free from infection     Description:                 Problem: Knowledge Deficit     Description:     Goal: Knowledge of disease process/condition, treatment plan, diagnostic tests, and medications will improve     Description:           Goal: Knowledge of the prescribed therapeutic regimen will improve     Description:                 Problem: Pain Management      Description:     Goal: Pain level will decrease to patient's comfort goal     Description:                 Problem: Psychosocial Needs:     Description:     Goal: Level of anxiety will decrease     Description:                 Problem: Respiratory:     Description:     Goal: Respiratory status will improve     Description:                 Problem: Safety     Description:     Goal: Will remain free from injury     Description:           Goal: Will remain free from falls     Description:                 Problem: Skin Integrity     Description:     Goal: Risk for impaired skin integrity will decrease     Description:                 Problem: Venous Thromboembolism (VTW)/Deep Vein Thrombosis (DVT) Prevention:     Description:     Goal: Patient will participate in Venous Thrombosis (VTE)/Deep Vein Thrombosis (DVT)Prevention Measures     Description:                        Long Term Goals:   At discharge patient will be able to function safely at home and in the community with support.    Section completed by:  Torsten Martinez R.N.          is now present  T wave inversion no longer evident in Inferior leads  Confirmed by Yunier Barth ([id number]) on 4/21/2022 6:58:23 PM         MDM:    Patient here with anxiety state nausea vomiting short of breath chronic abdominal pain. Awaiting labs imaging. Labs and imaging fairly unremarkable. CT scan negative for PE, dissection etc.  States she feels about the same on my examination she is not wheezing or in distress. Not currently vomiting. She has chronic abdominal pain. The delta troponin is negative. At this time will discharge home with medications she has cough and green sputum we will treat her for bronchitis. Patient otherwise stable discharge home. Given return precautions and follow-up information. She is okay with plan. She was mildly tachycardic on discharge is otherwise stable she appears nontoxic nonseptic. Will discharge home with medications return precautions and follow-up with patient. We will treat for bronchitis. Final Impression:  1. Bronchitis    2.  History of asthma        (Please note that portions of this note may have been completed with a voice recognition program. Efforts were made to edit the dictations but occasionally words are mis-transcribed.)    Blomming, Brian 113, DO  04/21/22 1948

## 2022-05-09 ENCOUNTER — TELEPHONE (OUTPATIENT)
Dept: WOUND CARE | Facility: MEDICAL CENTER | Age: 47
End: 2022-05-09
Payer: COMMERCIAL

## 2022-05-10 ENCOUNTER — TELEPHONE (OUTPATIENT)
Dept: WOUND CARE | Facility: MEDICAL CENTER | Age: 47
End: 2022-05-10
Payer: COMMERCIAL

## 2022-05-10 NOTE — TELEPHONE ENCOUNTER
Communication from Keiry RUSS RN at [facility]. She saw him yesterday. Was attempting to get order to decrease to once a week for home health.  has failed to make appointments and is now over 7 weeks and needs a new referral to return.

## 2022-05-25 ENCOUNTER — TELEPHONE (OUTPATIENT)
Dept: WOUND CARE | Facility: MEDICAL CENTER | Age: 47
End: 2022-05-25
Payer: COMMERCIAL

## 2022-05-25 DIAGNOSIS — L97.519 ULCER OF BOTH FEET, UNSPECIFIED ULCER STAGE (HCC): ICD-10-CM

## 2022-05-25 DIAGNOSIS — L97.529 ULCER OF BOTH FEET, UNSPECIFIED ULCER STAGE (HCC): ICD-10-CM

## 2022-06-14 ENCOUNTER — TELEPHONE (OUTPATIENT)
Dept: WOUND CARE | Facility: MEDICAL CENTER | Age: 47
End: 2022-06-14
Payer: COMMERCIAL

## 2022-06-14 ENCOUNTER — OFFICE VISIT (OUTPATIENT)
Dept: WOUND CARE | Facility: MEDICAL CENTER | Age: 47
End: 2022-06-14
Attending: NURSE PRACTITIONER
Payer: COMMERCIAL

## 2022-06-14 DIAGNOSIS — L97.512 CHRONIC FOOT ULCER, RIGHT, WITH FAT LAYER EXPOSED (HCC): ICD-10-CM

## 2022-06-14 NOTE — PROGRESS NOTES
Appointment made on 6/8  for today 6/14 at 1030am.   Patient called last night 6/13 at 7:05pm to cancel and reschedule his appointment.  Patient was called back this morning 6/14 at 7:03am and rescheduled to 6/23 at 10am.

## 2022-06-15 ENCOUNTER — TELEMEDICINE (OUTPATIENT)
Dept: MEDICAL GROUP | Facility: MEDICAL CENTER | Age: 47
End: 2022-06-15
Payer: COMMERCIAL

## 2022-06-15 DIAGNOSIS — Z78.9 IMPAIRED MOBILITY AND ADLS: ICD-10-CM

## 2022-06-15 DIAGNOSIS — R69 MULTIPLE COMORBID CONDITIONS: ICD-10-CM

## 2022-06-15 DIAGNOSIS — L97.529 ULCER OF BOTH FEET, UNSPECIFIED ULCER STAGE (HCC): ICD-10-CM

## 2022-06-15 DIAGNOSIS — M05.79 RHEUMATOID ARTHRITIS INVOLVING MULTIPLE SITES WITH POSITIVE RHEUMATOID FACTOR (HCC): ICD-10-CM

## 2022-06-15 DIAGNOSIS — Z74.09 IMPAIRED MOBILITY AND ADLS: ICD-10-CM

## 2022-06-15 DIAGNOSIS — R53.81 DEBILITY: ICD-10-CM

## 2022-06-15 DIAGNOSIS — F33.2 SEVERE EPISODE OF RECURRENT MAJOR DEPRESSIVE DISORDER, WITHOUT PSYCHOTIC FEATURES (HCC): ICD-10-CM

## 2022-06-15 DIAGNOSIS — F39 MOOD DISORDER (HCC): ICD-10-CM

## 2022-06-15 DIAGNOSIS — L97.519 ULCER OF BOTH FEET, UNSPECIFIED ULCER STAGE (HCC): ICD-10-CM

## 2022-06-15 DIAGNOSIS — R29.6 MULTIPLE FALLS: ICD-10-CM

## 2022-06-15 DIAGNOSIS — F41.1 GAD (GENERALIZED ANXIETY DISORDER): ICD-10-CM

## 2022-06-15 PROCEDURE — 99214 OFFICE O/P EST MOD 30 MIN: CPT | Mod: 95 | Performed by: NURSE PRACTITIONER

## 2022-06-15 ASSESSMENT — PATIENT HEALTH QUESTIONNAIRE - PHQ9: CLINICAL INTERPRETATION OF PHQ2 SCORE: 0

## 2022-06-15 NOTE — PROGRESS NOTES
Telemedicine Video Visit: Established Patient   This Remote Face to Face encounter was conducted via Zoom. Given the importance of social distancing and other strategies recommended to reduce the risk of COVID-19 transmission, I am providing medical care to this patient via audio/video visit in place of an in person visit at the request of the patient. Verbal consent to telehealth, risks, benefits, and consequences were discussed. Patient retains the right to withdraw at any time. All existing confidentiality protections apply. The patient has access to all transmitted medical information. No dissemination of any patient images or information to other entities without further written consent.  Subjective:     Chief Complaint   Patient presents with   • Referral Needed     Psychiatry, Internal Med       Richard Hubbard II is a 47 y.o. male presenting for evaluation and management of:    He presents today to request referrals.  Previously referred to internal med due to his complex chronic medical conditions.  Referral has since  at this time.  He is asking for another referral.  Lost to follow-up    Due to his chronic ulcers and nodules present on his feet he is having significant disruption to his mobility.  He is also having difficulty with wound healing.  He was following up with a local podiatrist that had subspecialty with rheumatological conditions however he tells me per his insurance that they are out of network and he is trying to find a new podiatrist in network.  He is having difficulty as he does not work know where to start his search with.  Recommend patient reaching out to his current podiatrist to ask if there is a colleague in town that may be in network, also calling his insurance to find out if they have a list of providers in network.    Has appt with Dr. Pryor to discuss the structural issues of his feet.     He would like a referral to get a motorized wheelchair as he does have  "significantly impaired mobility, impaired ability to perform ADLs, history of multiple falls, gait instability, ulcers on his feet, chronic pain.    He is requesting another referral over to psychiatry.  Previous referral has . He is requesting [facility]    ROS   Denies any recent fevers or chills. No nausea or vomiting. No chest pains or shortness of breath.     Allergies   Allergen Reactions   • Benadryl [Altaryl]      \"I get agitated\"   • Nsaids      Bleeding, \"I had a bleeding ulcer\"   • Phenergan [Promethazine Hcl]      \"I get very agitated\"   • Penicillins      \"Had some dizziness\"  Tolerated Unasyn 10/2019       Current medicines (including changes today)  Current Outpatient Medications   Medication Sig Dispense Refill   • miconazole (MICOTIN) 2 % Cream APPLY 1 GRAM TOPICALLY TWO TIMES A DAY 60 g 1   • doxycycline (VIBRAMYCIN) 100 MG Tab Take 100 mg by mouth 2 times a day.     • HUMIRA PEN 40 MG/0.4ML Pen-injector Kit      • morphine (MS IR) 15 MG tablet      • morphine ER (MS CONTIN) 15 MG Tab CR tablet      • nystatin (MYCOSTATIN) powder APPLY 1 GRAM TO AFFECTED AREA(S) TOPICALLY FOUR TIMES A DAY 30 g 6   • omeprazole (PRILOSEC) 40 MG delayed-release capsule Take 1 capsule by mouth every day. 30 capsule 6   • betamethasone dipropionate 0.05 % Cream Apply 1 g topically to affected areas twice daily for 14 days. 45 g 11   • furosemide (LASIX) 20 MG Tab Take 1 tablet by mouth 1 time a day as needed (for leg swelling). 30 tablet 1   • zolpidem (AMBIEN) 5 MG Tab      • XIIDRA 5 % Solution      • losartan (COZAAR) 25 MG Tab Take 1 Tab by mouth every day. 90 Tab 3   • DULoxetine (CYMBALTA) 30 MG Cap DR Particles Take 1 Cap by mouth every day. 30 Cap 2   • ascorbic acid (VITAMIN C) 500 MG tablet Take 1 Tab by mouth 2 times a day, with meals. 60 Tab 0   • ferrous sulfate 325 (65 Fe) MG tablet Take 1 Tab by mouth 2 times a day, with meals. 60 Tab 0   • vitamin D 2000 UNIT Tab Take 1 Tab by mouth every day. " 60 Tab    • docusate sodium 100 MG Cap Take 100 mg by mouth 2 Times a Day. 60 Cap    • acetaminophen (TYLENOL) 500 MG Tab Take 500-1,000 mg by mouth every 6 hours as needed.     • Naloxone (NARCAN) 4 MG/0.1ML Liquid One spray in one nostril for overdose and call 911. (Patient taking differently: Administer 1 Spray into affected nostril(S) as needed. One spray in one nostril for overdose and call 911.) 1 Package 0     No current facility-administered medications for this visit.       Patient Active Problem List    Diagnosis Date Noted   • Debility 06/15/2022   • Impaired mobility and ADLs 06/15/2022   • Ulcer of both feet (HCC) 06/15/2022   • Personal history of COVID-19 01/31/2022   • Chronic use of opiate drug for therapeutic purpose 01/18/2021   • Other chronic pain 01/18/2021   • Multiple falls 12/10/2020   • Localized osteoporosis without current pathological fracture 10/19/2020   • History of osteomyelitis 08/14/2020   • History of immunosuppressive therapy 08/14/2020   • S/P total knee arthroplasty, right 07/27/2020   • Paroxysmal atrial fibrillation ([facility]) 07/23/2020   • Cigarette nicotine dependence without complication 07/21/2020   • Aortic root dilatation ([facility]) 09/13/2019   • Essential hypertension 09/13/2019   • Aortic stenosis, moderate 09/12/2019   • Rheumatoid arthritis ([facility]) 09/08/2015   • Chronic pain of right knee 09/08/2015   • MONICA (obstructive sleep apnea) 03/09/2010   • Mood disorder (HCC) 05/21/2009   • ELISEO (generalized anxiety disorder) 05/21/2009       Family History   Problem Relation Age of Onset   • Hypertension Mother    • Arthritis Mother    • Depression Mother    • Cancer Father         bladder   • Schizophrenia Father    • Cancer Maternal Grandmother         breast   • Heart Disease Maternal Grandmother    • Psychiatric Illness Other    • Stroke Maternal Aunt    • Other Neg Hx         no connective tissue disorders or known aortic disease       He  has a past medical history of ADD  (attention deficit disorder), Alcohol abuse, Allergic rhinitis (5/21/2009), Anemia (09/2019), Anxiety (5/21/2009), Apnea, sleep, Arrhythmia, Back pain (5/21/2009), Bipolar disorder ([facility]), Bleeding ulcer (5/21/2009), Bleeding ulcer (5/21/2009), Bowel habit changes, Daytime sleepiness, Depression (5/21/2009), Eczema (5/21/2009), GERD (gastroesophageal reflux disease), Heart burn, Heart murmur, Hemorrhagic disorder ([facility]), History of bleeding ulcers (2/12/2015), Hypertension (05/03/2021), Insomnia (5/21/2009), Migraine (5/21/2009), Morning headache, Obesity, morbid ([facility]) (5/21/2009), Osteomyelitis ([facility]) (10/7/2019), Pain (09/2019), Pubic ramus fracture ([facility]) (9/28/2019), Rheumatoid arteritis ([facility]) (09/2019), Sleep apnea (5/21/2009), and Snoring.    He has no past medical history of Hyperlipidemia.  He  has a past surgical history that includes gastric bypass laparoscopic (2000); irrigation & debridement ortho (Left, 10/9/2019); other surgical procedure; other surgical procedure (2019); pr total knee arthroplasty (Right, 7/22/2020); cholecystectomy (2000); tendon transfer (Right, 1/22/2020); and other orthopedic surgery (Right, 2020).       Objective:   Vitals obtained by patient:  There were no vitals taken for this visit.    Physical Exam:  Constitutional: Alert, no distress, well-groomed.  Skin: No rashes in visible areas.  Eye: Round. Conjunctiva clear, lids normal. No icterus.   ENMT: Lips pink without lesions, good dentition, moist mucous membranes. Phonation normal.  Neck: No masses, no thyromegaly. Moves freely without pain.  CV: Pulse as reported by patient  Respiratory: Unlabored respiratory effort, no cough or audible wheeze  Psych: Alert and oriented x3, normal affect and mood.       Assessment and Plan:   The following treatment plan was discussed:     1. Multiple comorbid conditions  Due to chronic comorbid conditions and complex care recommend referral to internal medicine. Needs updated referral as he was  unable to complete previously.   - Referral to establish with [facility] PCP    2.  Severe episode of recurrent major depressive disorder, without psychotic features (HCC)  Chronic problem for the patient.  Not well controlled.  Has poor follow-up.  Overwhelmed by his multiple chronic medical conditions and debility. He is requesting referral to [facility] now.  -Referral to Psychiatry.    3. Mood disorder (HCC)  Chronic, not well controlled.  Poor follow-up.  Have placed multiple referrals to psychiatry and counseling.  Today's requesting another referral and specifically to Dr. ANNA MARIE Baldwin.  Have placed referral for patient.  Encouraged follow-up.  - Referral to Psychiatry    4. ELISEO (generalized anxiety disorder)  Chronic, not well controlled. See above.  - Referral to Psychiatry    5. Debility  6. Impaired mobility and ADLs  Referral placed to physical therapy for motorized wheelchair evaluation.  - Referral to Physical Therapy    7. Multiple falls  Multifactorial.   - Referral to Physical Therapy    8. Ulcer of both feet, unspecified ulcer stage (HCC)  Needs wheelchair.   - Referral to Physical Therapy    9. Rheumatoid arthritis involving multiple sites with positive rheumatoid factor (HCC)  Chronic. Followed by Dr. Conway for this.    - Referral to Physical Therapy        Follow-up: Return in about 6 months (around 12/15/2022).    Face to Face Video Visit:     MARTÍN Turner

## 2022-06-23 ENCOUNTER — OFFICE VISIT (OUTPATIENT)
Dept: WOUND CARE | Facility: MEDICAL CENTER | Age: 47
End: 2022-06-23
Attending: NURSE PRACTITIONER
Payer: COMMERCIAL

## 2022-06-23 DIAGNOSIS — L97.512 CHRONIC FOOT ULCER, RIGHT, WITH FAT LAYER EXPOSED (HCC): ICD-10-CM

## 2022-07-01 ENCOUNTER — OFFICE VISIT (OUTPATIENT)
Dept: URGENT CARE | Facility: PHYSICIAN GROUP | Age: 47
End: 2022-07-01
Payer: COMMERCIAL

## 2022-07-01 VITALS
SYSTOLIC BLOOD PRESSURE: 136 MMHG | WEIGHT: 238 LBS | HEIGHT: 61 IN | BODY MASS INDEX: 44.93 KG/M2 | HEART RATE: 87 BPM | RESPIRATION RATE: 24 BRPM | DIASTOLIC BLOOD PRESSURE: 68 MMHG | TEMPERATURE: 97.2 F | OXYGEN SATURATION: 95 %

## 2022-07-01 DIAGNOSIS — L97.522 ULCER OF BOTH FEET WITH FAT LAYER EXPOSED (HCC): ICD-10-CM

## 2022-07-01 DIAGNOSIS — L97.512 ULCER OF BOTH FEET WITH FAT LAYER EXPOSED (HCC): ICD-10-CM

## 2022-07-01 PROCEDURE — 99213 OFFICE O/P EST LOW 20 MIN: CPT | Performed by: STUDENT IN AN ORGANIZED HEALTH CARE EDUCATION/TRAINING PROGRAM

## 2022-07-01 RX ORDER — SULFAMETHOXAZOLE AND TRIMETHOPRIM 800; 160 MG/1; MG/1
2 TABLET ORAL 2 TIMES DAILY
Qty: 20 TABLET | Refills: 0 | Status: SHIPPED | OUTPATIENT
Start: 2022-07-01 | End: 2022-07-06

## 2022-07-01 RX ORDER — PREDNISONE 10 MG/1
TABLET ORAL
COMMUNITY
Start: 2022-06-21 | End: 2023-01-01

## 2022-07-01 ASSESSMENT — ENCOUNTER SYMPTOMS
WHEEZING: 0
CONSTIPATION: 0
VOMITING: 0
BACK PAIN: 0
FEVER: 0
NECK PAIN: 0
EYES NEGATIVE: 1
NAUSEA: 0
CHILLS: 0
PALPITATIONS: 0
TINGLING: 0
SENSORY CHANGE: 0
ABDOMINAL PAIN: 0
SHORTNESS OF BREATH: 0
DIARRHEA: 0
WEAKNESS: 0
FALLS: 0

## 2022-07-01 ASSESSMENT — FIBROSIS 4 INDEX: FIB4 SCORE: 1.24

## 2022-07-01 NOTE — PROGRESS NOTES
"Subjective     Richard Hubbard II is a 47 y.o. male who presents with Foot Problem (Infected nods on feet. )            Pt states he has RA nodules on medial aspect of left/right feet. He see ortho and a podiatrist as well as wound clinic regarding RA nodules and chronic foot ulcers. Pt states he thinks they are getting infected. Has noticed redness and warmth and noticeable purulent discharge from both foot ulcers. No fever. Pt has an appointment with wound clinic next week. Pt has no hx of diabetes.      Review of Systems   Constitutional: Negative for chills and fever.   HENT: Negative.    Eyes: Negative.    Respiratory: Negative for shortness of breath and wheezing.    Cardiovascular: Negative for chest pain and palpitations.   Gastrointestinal: Negative for abdominal pain, constipation, diarrhea, nausea and vomiting.   Genitourinary: Negative.    Musculoskeletal: Negative for back pain, falls, joint pain and neck pain.   Skin: Negative for itching and rash.        Positive foot ulcers.   Neurological: Negative for tingling, sensory change and weakness.              Objective     /68   Pulse 87   Temp 36.2 °C (97.2 °F) (Temporal)   Resp (!) 24   Ht 1.549 m (5' 1\")   Wt 108 kg (238 lb)   SpO2 95%   BMI 44.97 kg/m²      Physical Exam  Vitals reviewed.   Constitutional:       Appearance: Normal appearance.   Cardiovascular:      Rate and Rhythm: Regular rhythm.   Pulmonary:      Effort: Pulmonary effort is normal.   Musculoskeletal:      Right foot: Normal capillary refill. Normal pulse.      Left foot: Normal range of motion and normal capillary refill. Normal pulse.   Feet:      Right foot:      Skin integrity: Ulcer, skin breakdown, erythema and warmth present.      Left foot:      Skin integrity: Ulcer, skin breakdown, erythema and warmth present.   Skin:     General: Skin is warm and dry.      Capillary Refill: Capillary refill takes less than 2 seconds.   Neurological:      General: No " focal deficit present.      Mental Status: He is alert and oriented to person, place, and time.                             Assessment & Plan        1. Ulcer of both feet with fat layer exposed (HCC)  - sulfamethoxazole-trimethoprim (BACTRIM DS) 800-160 MG tablet; Take 2 Tablets by mouth 2 times a day for 5 days.  Dispense: 20 Tablet; Refill: 0    Other orders  - predniSONE (DELTASONE) 10 MG Tab         Pt presents today with chronic foot ulcers. Pt has adequate specialist f/u with ortho, podiatrist and wound clinic. Pt came to urgent care due to increased erythema, tenderness, warmth and purulent discharge from foot ulcers. Pt has f/u appointment with wound clinic next week. Pt to start bactrim for 5 days to treat infection of chronic foot ulcers.    Pt educated on wound care instructions. Wound care instructions include:    • Wash the wound with mild soap and water or a saline solution.  • If you use soap, rinse the wound with water to remove all soap.  • Do not rub the wound dry. Pat the wound gently, or let it air dry.  • Keep the dressing dry.  • Change any dressing if it gets wet, gets dirty, or starts to smell bad.   • To change your bandage: Wash your hands with soap and water before and after you change your bandage.   • Check your wound every day for signs of infection and return to urgent care or ED if notices: increased redness, swelling, or pain, more fluid, drainage or blood, increased warmth, pus or a bad smell.  • Do not scratch or pick at the wound.  • Protect the injured area until it has healed.    I personally reviewed prior external notes and test results pertinent to today's visit.    Differential diagnoses, supportive care, and indications for immediate follow-up discussed with patient. Pathogenesis of diagnosis discussed including typical length and natural progression.      Instructed pt to return to urgent care or nearest emergency department if symptoms fail to improve, for any change in  condition, further concerns, or new concerning symptoms.    Patient states understanding and agrees with the plan of care and discharge instructions.

## 2022-07-07 ENCOUNTER — OFFICE VISIT (OUTPATIENT)
Dept: WOUND CARE | Facility: MEDICAL CENTER | Age: 47
End: 2022-07-07
Attending: NURSE PRACTITIONER
Payer: COMMERCIAL

## 2022-07-07 VITALS
HEART RATE: 67 BPM | SYSTOLIC BLOOD PRESSURE: 100 MMHG | DIASTOLIC BLOOD PRESSURE: 66 MMHG | RESPIRATION RATE: 20 BRPM | OXYGEN SATURATION: 98 % | TEMPERATURE: 97.5 F

## 2022-07-07 DIAGNOSIS — L97.512 CHRONIC FOOT ULCER, RIGHT, WITH FAT LAYER EXPOSED (HCC): Primary | ICD-10-CM

## 2022-07-07 DIAGNOSIS — L08.9 WOUND INFECTION: ICD-10-CM

## 2022-07-07 DIAGNOSIS — T14.8XXA WOUND INFECTION: ICD-10-CM

## 2022-07-07 DIAGNOSIS — L97.522 CHRONIC FOOT ULCER WITH FAT LAYER EXPOSED, LEFT (HCC): ICD-10-CM

## 2022-07-07 DIAGNOSIS — Z92.25 HISTORY OF IMMUNOSUPPRESSIVE THERAPY: ICD-10-CM

## 2022-07-07 DIAGNOSIS — M06.372 RHEUMATOID NODULE, LEFT ANKLE AND FOOT (HCC): ICD-10-CM

## 2022-07-07 DIAGNOSIS — L97.512 ULCER OF BOTH FEET WITH FAT LAYER EXPOSED (HCC): ICD-10-CM

## 2022-07-07 DIAGNOSIS — L97.522 ULCER OF BOTH FEET WITH FAT LAYER EXPOSED (HCC): ICD-10-CM

## 2022-07-07 PROCEDURE — 99212 OFFICE O/P EST SF 10 MIN: CPT | Mod: 25 | Performed by: NURSE PRACTITIONER

## 2022-07-07 PROCEDURE — 99214 OFFICE O/P EST MOD 30 MIN: CPT

## 2022-07-07 PROCEDURE — 11042 DBRDMT SUBQ TIS 1ST 20SQCM/<: CPT | Performed by: NURSE PRACTITIONER

## 2022-07-07 PROCEDURE — 11042 DBRDMT SUBQ TIS 1ST 20SQCM/<: CPT

## 2022-07-08 ENCOUNTER — TELEPHONE (OUTPATIENT)
Dept: WOUND CARE | Facility: MEDICAL CENTER | Age: 47
End: 2022-07-08
Payer: COMMERCIAL

## 2022-07-08 RX ORDER — DOXYCYCLINE 100 MG/1
100 CAPSULE ORAL 2 TIMES DAILY
Qty: 28 CAPSULE | Refills: 0 | Status: SHIPPED | OUTPATIENT
Start: 2022-07-08 | End: 2022-07-22

## 2022-07-08 ASSESSMENT — ENCOUNTER SYMPTOMS
CHILLS: 0
FEVER: 0
COUGH: 0
SHORTNESS OF BREATH: 0
DIARRHEA: 0
CONSTIPATION: 0
NAUSEA: 0
VOMITING: 0
BACK PAIN: 1

## 2022-07-08 NOTE — PROGRESS NOTES
Provider Encounter-full thickness wound      HISTORY OF PRESENT ILLNESS  Wound History:   START OF CARE IN CLINIC: 07/07/22   REFERRING PROVIDER: Brit Fitch   WOUND-full-thickness ulcers   LOCATION: Right plantar/lateral midfoot                                   left plantar/lateral midfoot   HISTORY: Patient with history of RA referred to [facility] for evaluation treatment of bilateral plantar foot ulcers.  States that the right plantar foot ulcer started in early 2021, is a small wound which gradually became bigger.  He has another ulcer to his left foot which healed at one time, but reopened in early March of this year.  He has been seeing a podiatrist, who was told him that the ulcers are due to RA nodules.  He was at one point referred to Dr. Pryor at [facility], who told him that his podiatrist would need to remove the nodules.  Podiatry states that that is not something she would do, has referred him back to [facility].      Pertinent Medical History: Rheumatoid arthritis, history of osteomyelitis, immunosuppressive therapy, anxiety disorder, MONICA, aortic root dilation, hypertension, paroxysmal atrial fibrillation, aortic stenosis      TOBACCO USE:   Current everyday smoker 0.25 packs/day smokes cigarettes and cigars    Patient's problem list, allergies, and current medications reviewed and updated in Epic    Interval History:    7/8/2022: Clinic visit with JONATHON Florian   states overall he is feeling well denies fever or chills.  Patient was seen at [facility] in March for the same wounds.  Back in March patient was to reschedule with Dr. Pryor from the [facility].  I do not see any notes in epic we will request last clinical note from Dr. Pryor, I believe the patient did not reschedule for follow-up.  He states he was previously told that his insurance would not cover offloading shoes because he is not a diabetic.  Patient is on his feet often due to 2 young sons that he cares for he reports his  wife is an APRN and he takes care of the kids.  He was previously given a prescription to [facility] for offloading shoes and orthotics in March however, he did not fill the prescription.  Patient reports that he has compression stockings but is not currently wearing them.    REVIEW OF SYSTEMS:   Review of Systems   Constitutional: Negative for chills and fever.   Respiratory: Negative for cough and shortness of breath.    Cardiovascular: Negative for chest pain.   Gastrointestinal: Negative for constipation, diarrhea, nausea and vomiting.        Occasional constipation, due to narcotic use   Musculoskeletal: Positive for back pain and joint pain.        Long history of RA   Skin: Negative for rash.       PHYSICAL EXAMINATION:   /66 (BP Location: Left arm, Patient Position: Sitting)   Pulse 67   Temp 36.4 °C (97.5 °F)   Resp 20   SpO2 98%     Physical Exam  Constitutional:       Appearance: He is obese.   HENT:      Head: Normocephalic.   Eyes:      Pupils: Pupils are equal, round, and reactive to light.   Cardiovascular:      Rate and Rhythm: Normal rate.      Comments: Pedal pulses palpable  Pulmonary:      Effort: Pulmonary effort is normal.   Musculoskeletal:      Right lower leg: Edema present.      Left lower leg: Edema present.      Comments: Charcot deformities of both feet  RA changes to hands and feet  Pitting edema to bilateral lower extremities right greater than left   Skin:     Findings: Erythema present.      Comments: Full-thickness ulcers to bilateral plantar/lateral feet with periwound callus  Erythema to right lower extremity  Refer to wound flowsheet and photos   Neurological:      General: No focal deficit present.      Mental Status: He is alert and oriented to person, place, and time.   Psychiatric:         Mood and Affect: Mood normal.         Behavior: Behavior normal.         Thought Content: Thought content normal.         Judgment: Judgment normal.         WOUND ASSESSMENT  Wound  03/25/22 Full Thickness Wound Foot Left --Left Plantar Mid Foot (Active)   Wound Image    07/07/22 1040   Site Assessment Pink;Yellow 07/07/22 1040   Periwound Assessment Maceration;Callused 07/07/22 1040   Margins Unattached edges 07/07/22 1040   Drainage Amount Moderate 07/07/22 1040   Drainage Description Serous 07/07/22 1040   Treatments Cleansed;Topical Lidocaine;Provider debridement;Site care 07/07/22 1040   Wound Cleansing Puracyn Spray 07/07/22 1040   Periwound Protectant Skin Protectant Wipes to Periwound 07/07/22 1040   Dressing Cleansing/Solutions Not Applicable 07/07/22 1040   Dressing Options Hydrofiber Silver;Silicone Adhesive Foam;Tubigrip 07/07/22 1040   Dressing Changed New 03/25/22 1115   Dressing Change/Treatment Frequency Every 72 hrs, and As Needed 03/25/22 1115   Non-staged Wound Description Full thickness 07/07/22 1040   Wound Length (cm) 2.1 cm 07/07/22 1040   Wound Width (cm) 1.9 cm 07/07/22 1040   Wound Depth (cm) 0.2 cm 07/07/22 1040   Wound Surface Area (cm^2) 3.99 cm^2 07/07/22 1040   Wound Volume (cm^3) 0.798 cm^3 07/07/22 1040   Post-Procedure Length (cm) 2.1 cm 07/07/22 1040   Post-Procedure Width (cm) 2.1 cm 07/07/22 1040   Post-Procedure Depth (cm) 0.3 cm 07/07/22 1040   Post-Procedure Surface Area (cm^2) 4.41 cm^2 07/07/22 1040   Post-Procedure Volume (cm^3) 1.323 cm^3 07/07/22 1040   Wound Healing % -196 07/07/22 1040   Tunneling (cm) 0 cm 07/07/22 1040   Tunneling Clock Position of Wound 3 03/25/22 1115   Undermining (cm) 0 cm 07/07/22 1040   Wound Odor None 07/07/22 1040   Pulses Left;DP;PT;Doppler 07/07/22 1040   Exposed Structures None 07/07/22 1040   Number of days: 105       Wound 03/25/22 Full Thickness Wound Foot Right --Right Plantar Mid Foot (Active)   Wound Image    07/07/22 1040   Site Assessment Red;Yellow 07/07/22 1040   Periwound Assessment Maceration;Callused 07/07/22 1040   Margins Unattached edges 07/07/22 1040   Drainage Amount Moderate 07/07/22 1040   Drainage  Description Serous 07/07/22 1040   Treatments Cleansed;Topical Lidocaine;Provider debridement;Site care 07/07/22 1040   Wound Cleansing Puracyn Spray 07/07/22 1040   Periwound Protectant Skin Protectant Wipes to Periwound;Barrier Paste 07/07/22 1040   Dressing Cleansing/Solutions Not Applicable 07/07/22 1040   Dressing Options Hydrofiber Silver;Silicone Adhesive Foam;Tubigrip 07/07/22 1040   Dressing Changed New 03/25/22 1115   Dressing Change/Treatment Frequency Every 72 hrs, and As Needed 03/25/22 1115   Non-staged Wound Description Full thickness 07/07/22 1040   Wound Length (cm) 1.3 cm 07/07/22 1040   Wound Width (cm) 1.4 cm 07/07/22 1040   Wound Depth (cm) 0.3 cm 07/07/22 1040   Wound Surface Area (cm^2) 1.82 cm^2 07/07/22 1040   Wound Volume (cm^3) 0.546 cm^3 07/07/22 1040   Post-Procedure Length (cm) 1.4 cm 07/07/22 1040   Post-Procedure Width (cm) 1.6 cm 07/07/22 1040   Post-Procedure Depth (cm) 0.4 cm 07/07/22 1040   Post-Procedure Surface Area (cm^2) 2.24 cm^2 07/07/22 1040   Post-Procedure Volume (cm^3) 0.896 cm^3 07/07/22 1040   Wound Healing % -44 07/07/22 1040   Tunneling (cm) 0 cm 07/07/22 1040   Undermining (cm) 0 cm 07/07/22 1040   Wound Odor None 07/07/22 1040   Pulses Right;DP;PT;Doppler 07/07/22 1040   Exposed Structures None 07/07/22 1040   Number of days: 105         PROCEDURE:   -2% viscous lidocaine applied topically to wound beds for approximately 5 minutes prior to debridement  -Curette used to debride periwound callus to skin level.  Total area of callus.  Approximately 4cm²  -Curette then used to debride wound beds and periwound callus.  Excisional debridement was performed to remove devitalized tissue until healthy, bleeding tissue was visualized.   Entire surface of wound, 6.65 cm2 debrided.  Tissue debrided into the subcutaneous layer.    -Bleeding controlled with manual pressure.    -Wound care completed by wound RN, refer to flowsheet  -Patient tolerated the procedure well, without  c/o pain or discomfort.     Pertinent Labs and Diagnostics:    Labs: N/A    IMAGING: Foot/Toes Imaging, Past Year DX-TOE(S) 2+ LEFT    Result Date: 8/18/2021  Narrative 8/18/2021 1:57 PM HISTORY/REASON FOR EXAM:  Pain/Deformity Following Trauma. TECHNIQUE/EXAM DESCRIPTION AND NUMBER OF VIEWS:  3 views of the LEFT toes. COMPARISON: January 1 FINDINGS: There is osteopenia in this decreases sensitivity of the study for the detection of fracture New first proximal phalanx base lucency seen medially extends to the metatarsal-phalangeal articular margin Third and fourth metatarsal neck fractures have healed with slight valgus angulation. Probable chronic fifth metatarsal head bunionectomy laterally, unchanged No subluxation     Impression Acute-subacute first proximal phalanx intra-articular base fracture is nondisplaced Healed third and fourth metatarsal neck fractures Severe osteopenia      VASCULAR STUDIES: No results found.     LAST  WOUND CULTURE:   Lab Results   Component Value Date/Time    CULTRSULT - (A) 05/04/2021 02:20 PM    CULTRSULT Serratia marcescens  Light growth   (A) 05/04/2021 02:20 PM         ASSESSMENT AND PLAN:     1. Chronic foot ulcer with fat layer exposed, left (HCC); Chronic foot ulcer, right, with fat layer exposed (HCC)  Comments: Right plantar foot wound present for over a year, left plantar foot ulcer reopened in early March 2022 07/08/22:   -Excisional debridement of wound in clinic today, medically necessary to promote wound healing.  -Patient to return to clinic weekly for assessment and debridement  -Patient to change dressing 1-2 times per week in between clinic visits  -Discussed with the patient appropriate offloading this is severely complicated while raising young children  -Patient to follow-up with Dr. Pryor discuss with patient next clinical appointment.  Patient may need to consider surgical options for treatment of these wounds.  -Patient with erythema, edema and warmth  to right lower extremity.  Will place on doxy for its anti microbial coverage for MSSA and MRSA as well as its anti-inflamatory properties. Will reassess at next clinic visit.  Patient instructed to go to the ER for any increased erythema, edema, pain, fever, chills.     Wound care: Hydrofiber silver, silicone adhesive foam, Tubigrip F    2. Rheumatoid nodule, left ankle and foot (HCC); Rheumatoid nodule, right ankle and foot (HCC)\  Comments: Per patient, ulcers caused by underlying nodules.  Podiatrist does not want to operate, has referred patient back to [facility]    -Patient completed x-rays of left and right feet in April 2022 which showed flatfoot deformity to both feet however no radiographic evidence of osteomyelitis  -Patient instructed that he should follow-up with the [facility] regarding rheumatoid nodules and flatfoot deformity      3.  History of immunosuppressive therapy  Comments: Patient has a long history of immune suppressive therapy due to RA.  This is complicating wound healing.    PATIENT EDUCATION:  - Implications of loss of protective sensation (LOPS) discussed with patient- including increased risk for amputation.  - Advised to check feet at least daily, moisturize feet, and to always wear protective foot wear.   -  Importance of offloading foot to assist with wound healing  - Advised pt not to trim nails or calluses, seek foot/nail care from podiatrist or certified foot/nail nurse  - Importance of adequate nutrition for wound healing    > 30 min spent face to face with patient, >50% of time spent counseling, coordinating care, reviewing records, discussing POC, educating patient        Please note that this note may have been created using voice recognition software. I have worked with technical experts from [employer] to optimize the interface.  I have made every reasonable attempt to correct obvious errors, but there may be errors of grammar and possibly content that I did not discover before  finalizing the note.

## 2022-07-15 ENCOUNTER — OFFICE VISIT (OUTPATIENT)
Dept: WOUND CARE | Facility: MEDICAL CENTER | Age: 47
End: 2022-07-15
Attending: NURSE PRACTITIONER
Payer: COMMERCIAL

## 2022-07-15 ENCOUNTER — TELEPHONE (OUTPATIENT)
Dept: WOUND CARE | Facility: MEDICAL CENTER | Age: 47
End: 2022-07-15

## 2022-07-15 VITALS
OXYGEN SATURATION: 95 % | SYSTOLIC BLOOD PRESSURE: 106 MMHG | HEART RATE: 68 BPM | TEMPERATURE: 97.6 F | DIASTOLIC BLOOD PRESSURE: 73 MMHG | RESPIRATION RATE: 18 BRPM

## 2022-07-15 DIAGNOSIS — L97.522 CHRONIC FOOT ULCER WITH FAT LAYER EXPOSED, LEFT (HCC): ICD-10-CM

## 2022-07-15 DIAGNOSIS — M06.372 RHEUMATOID NODULE, LEFT ANKLE AND FOOT (HCC): ICD-10-CM

## 2022-07-15 DIAGNOSIS — S91.332D: ICD-10-CM

## 2022-07-15 DIAGNOSIS — L97.522 ULCER OF BOTH FEET WITH FAT LAYER EXPOSED (HCC): Primary | ICD-10-CM

## 2022-07-15 DIAGNOSIS — L97.512 ULCER OF BOTH FEET WITH FAT LAYER EXPOSED (HCC): Primary | ICD-10-CM

## 2022-07-15 DIAGNOSIS — L97.512 CHRONIC FOOT ULCER, RIGHT, WITH FAT LAYER EXPOSED (HCC): ICD-10-CM

## 2022-07-15 DIAGNOSIS — S91.331D: ICD-10-CM

## 2022-07-15 DIAGNOSIS — Z92.25 HISTORY OF IMMUNOSUPPRESSIVE THERAPY: ICD-10-CM

## 2022-07-15 PROCEDURE — 99213 OFFICE O/P EST LOW 20 MIN: CPT | Mod: 25 | Performed by: NURSE PRACTITIONER

## 2022-07-15 PROCEDURE — 99213 OFFICE O/P EST LOW 20 MIN: CPT

## 2022-07-15 PROCEDURE — 11042 DBRDMT SUBQ TIS 1ST 20SQCM/<: CPT | Performed by: NURSE PRACTITIONER

## 2022-07-15 PROCEDURE — 11042 DBRDMT SUBQ TIS 1ST 20SQCM/<: CPT

## 2022-07-15 NOTE — PATIENT INSTRUCTIONS
Reviewed POC, importance of keeping the secondary dressing dry and intact, nutrition for wound healing, importance of offloading, s/s of complications/infection, when to notify MD/go to ER.  Pt verbalized understanding to all.

## 2022-07-15 NOTE — PROGRESS NOTES
Provider Encounter-full thickness wound      HISTORY OF PRESENT ILLNESS  Wound History:   START OF CARE IN CLINIC: 07/07/22   REFERRING PROVIDER: Brit Fitch   WOUND-full-thickness ulcers   LOCATION: Right plantar/medial midfoot                                   left plantar/medial midfoot   HISTORY: Patient with history of RA referred to [facility] for evaluation treatment of bilateral plantar foot ulcers.  States that the right plantar foot ulcer started in early 2021, is a small wound which gradually became bigger.  He has another ulcer to his left foot which healed at one time, but reopened in early March of this year.  He has been seeing a podiatrist, who was told him that the ulcers are due to RA nodules.  He was at one point referred to Dr. Pryor at [facility], who told him that his podiatrist would need to remove the nodules.  Podiatry states that that is not something she would do, has referred him back to [facility].      Pertinent Medical History: Rheumatoid arthritis, history of osteomyelitis, immunosuppressive therapy, anxiety disorder, MONICA, aortic root dilation, hypertension, paroxysmal atrial fibrillation, aortic stenosis      TOBACCO USE:   Current everyday smoker 0.25 packs/day smokes cigarettes and cigars    Patient's problem list, allergies, and current medications reviewed and updated in Epic    Interval History:    7/8/2022: Clinic visit with JONATHON Florian   states overall he is feeling well denies fever or chills.  Patient was seen at [facility] in March for the same wounds.  Back in March patient was to reschedule with Dr. Pryor from the [facility].  I do not see any notes in epic we will request last clinical note from Dr. Pryor, I believe the patient did not reschedule for follow-up.  He states he was previously told that his insurance would not cover offloading shoes because he is not a diabetic.  Patient is on his feet often due to 2 young sons that he cares for he reports his wife  is an APRN and he takes care of the kids.  He was previously given a prescription to [facility] for offloading shoes and orthotics in March however, he did not fill the prescription.  Patient reports that he has compression stockings but is not currently wearing them.    7/15/2022 : Clinic visit with MAGNUS Hdz, DUYEN, OMAR, CFNIMESH.   states that overall he feels okay.  He is accompanied today by his 4-year-old son.  He was unable to tolerate Hydrofiber silver dressing applied last visit, states that it caused burning sensation and was too wet.  He also presents today wearing regular tennis shoes.  He has not yet obtained off loading shoe/boot from orthotist.  He states he is planning to go to [facility] later today.  [facility] continues to see him in between clinic visits.  He has scheduled appointment to see Dr. Pryor at [facility], believes his appointment is in a few weeks.    REVIEW OF SYSTEMS:   Unchanged from last clinical visit on 7/8/2022    PHYSICAL EXAMINATION:   /73 (BP Location: Right arm, Patient Position: Sitting)   Pulse 68   Temp 36.4 °C (97.6 °F)   Resp 18   SpO2 95%     Physical Exam  Constitutional:       Appearance: He is obese.   HENT:      Head: Normocephalic.   Eyes:      Pupils: Pupils are equal, round, and reactive to light.   Cardiovascular:      Rate and Rhythm: Normal rate.      Comments: Palpable pedal pulses, 2+ bilaterally  Pulmonary:      Effort: Pulmonary effort is normal.   Musculoskeletal:      Right lower leg: Edema present.      Left lower leg: Edema present.      Comments: Charcot deformities of both feet  RA changes to hands and feet  Pitting edema/lymphedema to bilateral lower extremities   Skin:     Findings: Erythema present.      Comments: Full-thickness ulcers to bilateral plantar/medial, over bony prominences.  Area has increased slightly since last assessment, periwound with macerated callus.    Refer to wound flowsheet and photos    Neurological:      General: No focal deficit present.      Mental Status: He is alert and oriented to person, place, and time.   Psychiatric:         Mood and Affect: Mood normal.         Behavior: Behavior normal.         Thought Content: Thought content normal.         Judgment: Judgment normal.         WOUND ASSESSMENT  Wound 03/25/22 Full Thickness Wound Foot Left --Left Plantar Mid Foot (Active)   Wound Image   07/15/22 1000   Site Assessment Pink;Yellow;White 07/15/22 1000   Periwound Assessment Maceration;Callused 07/15/22 1000   Margins Unattached edges 07/15/22 1000   Drainage Amount Small 07/15/22 1000   Drainage Description Serosanguineous 07/15/22 1000   Treatments Cleansed;Topical Lidocaine;Provider debridement 07/15/22 1000   Wound Cleansing Puracyn Hollis 07/15/22 1000   Periwound Protectant Skin Protectant Wipes to Periwound 07/15/22 1000   Dressing Cleansing/Solutions Not Applicable 07/15/22 1000   Dressing Options Hydrofera Blue Ready;Hypafix Tape 07/15/22 1000   Dressing Changed New 07/15/22 1000   Dressing Change/Treatment Frequency Every 72 hrs, and As Needed 07/15/22 1000   Non-staged Wound Description Full thickness 07/15/22 1000   Wound Length (cm) 2.1 cm 07/15/22 1000   Wound Width (cm) 1.7 cm 07/15/22 1000   Wound Depth (cm) 0.2 cm 07/07/22 1040   Wound Surface Area (cm^2) 3.57 cm^2 07/15/22 1000   Wound Volume (cm^3) 0.798 cm^3 07/07/22 1040   Post-Procedure Length (cm) 2.4 cm 07/15/22 1000   Post-Procedure Width (cm) 1.8 cm 07/15/22 1000   Post-Procedure Depth (cm) 0.4 cm 07/15/22 1000   Post-Procedure Surface Area (cm^2) 4.32 cm^2 07/15/22 1000   Post-Procedure Volume (cm^3) 1.728 cm^3 07/15/22 1000   Wound Healing % -196 07/07/22 1040   Tunneling (cm) 0 cm 07/15/22 1000   Tunneling Clock Position of Wound 3 03/25/22 1115   Undermining (cm) 0 cm 07/15/22 1000   Wound Odor None 07/15/22 1000   Pulses Left;DP;PT;Doppler 07/07/22 1040   Exposed Structures None 07/15/22 1000   Number of  days: 112       Wound 03/25/22 Full Thickness Wound Foot Right --Right Plantar Mid Foot (Active)   Wound Image   07/15/22 1000   Site Assessment Red;Yellow 07/15/22 1000   Periwound Assessment Maceration;Callused 07/15/22 1000   Margins Unattached edges 07/15/22 1000   Drainage Amount Small 07/15/22 1000   Drainage Description Serosanguineous 07/15/22 1000   Treatments Cleansed;Topical Lidocaine;Provider debridement 07/15/22 1000   Wound Cleansing Puracyn [place] 07/15/22 1000   Periwound Protectant Barrier Paste 07/15/22 1000   Dressing Cleansing/Solutions Not Applicable 07/15/22 1000   Dressing Options Hydrofera Blue Ready;Hypafix Tape 07/15/22 1000   Dressing Changed New 07/15/22 1000   Dressing Change/Treatment Frequency Every 72 hrs, and As Needed 07/15/22 1000   Non-staged Wound Description Full thickness 07/15/22 1000   Wound Length (cm) 1.4 cm 07/15/22 1000   Wound Width (cm) 1.3 cm 07/15/22 1000   Wound Depth (cm) 0.3 cm 07/15/22 1000   Wound Surface Area (cm^2) 1.82 cm^2 07/15/22 1000   Wound Volume (cm^3) 0.546 cm^3 07/15/22 1000   Post-Procedure Length (cm) 1.6 cm 07/15/22 1000   Post-Procedure Width (cm) 1.4 cm 07/15/22 1000   Post-Procedure Depth (cm) 0.4 cm 07/15/22 1000   Post-Procedure Surface Area (cm^2) 2.24 cm^2 07/15/22 1000   Post-Procedure Volume (cm^3) 0.896 cm^3 07/15/22 1000   Wound Healing % -44 07/15/22 1000   Tunneling (cm) 0 cm 07/15/22 1000   Undermining (cm) 0.5 cm 07/15/22 1000   Undermining of Wound, 1st Location From 4 o'clock;To 8 o'clock 07/15/22 1000   Wound Odor None 07/15/22 1000   Pulses Right;DP;PT;Doppler 07/07/22 1040   Exposed Structures None 07/15/22 1000   Number of days: 112         PROCEDURE:   Excisional debridement of right and left plantar/medial foot ulcers  -2% viscous lidocaine applied topically to wound beds for approximately 5 minutes prior to debridement  -Curette used to debride periwound callus to skin level.  Total area of callus.  Approximately  2.0cm²  -Curette then used to debride wound beds.  Excisional debridement was performed to remove devitalized tissue until healthy, bleeding tissue was visualized.   Entire surface of wounds, 6.56 cm2 debrided.  Tissue debrided into the subcutaneous layer.    -Bleeding controlled with manual pressure.    -Wound care completed by wound RN, refer to flowsheet  -Patient tolerated the procedure well, without c/o pain or discomfort.     Pertinent Labs and Diagnostics:    Labs: N/A    IMAGING: Foot/Toes Imaging, Past Year DX-TOE(S) 2+ LEFT    Result Date: 8/18/2021  Narrative 8/18/2021 1:57 PM HISTORY/REASON FOR EXAM:  Pain/Deformity Following Trauma. TECHNIQUE/EXAM DESCRIPTION AND NUMBER OF VIEWS:  3 views of the LEFT toes. COMPARISON: January 1 FINDINGS: There is osteopenia in this decreases sensitivity of the study for the detection of fracture New first proximal phalanx base lucency seen medially extends to the metatarsal-phalangeal articular margin Third and fourth metatarsal neck fractures have healed with slight valgus angulation. Probable chronic fifth metatarsal head bunionectomy laterally, unchanged No subluxation     Impression Acute-subacute first proximal phalanx intra-articular base fracture is nondisplaced Healed third and fourth metatarsal neck fractures Severe osteopenia      VASCULAR STUDIES: No results found.     LAST  WOUND CULTURE:   Lab Results   Component Value Date/Time    CULTRSULT - (A) 05/04/2021 02:20 PM    CULTRSULT Serratia marcescens  Light growth   (A) 05/04/2021 02:20 PM         ASSESSMENT AND PLAN:     1. Chronic foot ulcer with fat layer exposed, left (HCC); Chronic foot ulcer, right, with fat layer exposed (HCC)  Comments: Right plantar foot wound present for over a year, left plantar foot ulcer reopened in early March 2022    7/15/2022: Area of wounds has not changed significantly.  Periwound with macerated callus.  He was not able to tolerate silver Hydrofiber, reports burning  sensation.  -Excisional debridement of wound and periwound callus in clinic today, medically necessary to promote wound healing.  -Patient to return to clinic weekly for assessment and debridement  -Home health to change dressing 1-2 times per week in between clinic visits  -Reiterated the importance of offloading is much as possible in order for these wounds to heal.  Patient has not yet been able to obtain offloading shoe/boot.  States he will go to [facility] later today.  -No signs or symptoms of infection today.     Wound care: Hydrofera Blue, silicone adhesive foam, Tubigrip F    2. Rheumatoid nodule, left ankle and foot (HCC); Rheumatoid nodule, right ankle and foot (HCC)\  Comments: Per patient, ulcers caused by underlying nodules.  Podiatrist does not want to operate, has referred patient back to [facility]    7/15/2022: Patient states he is made an appointment to see Dr. Pryor at [facility], though does not remember the date  -Healing of these wounds will be extremely challenging, if not impossible without surgical correction of these deformities.      3.  History of immunosuppressive therapy  Comments: Patient has a long history of immune suppressive therapy due to RA.  This is complicating wound healing.    7/15/2022: Patient states he only takes prednisone as needed for RA flareups.  He is currently not taking p.o. steroid    PATIENT EDUCATION:  - Implications of loss of protective sensation (LOPS) discussed with patient- including increased risk for amputation.  - Advised to check feet at least daily, moisturize feet, and to always wear protective foot wear.   -  Importance of offloading foot to assist with wound healing  - Advised pt not to trim nails or calluses, seek foot/nail care from podiatrist or certified foot/nail nurse  - Importance of adequate nutrition for wound healing    My total time spent caring for the patient on the day of the encounter was 20 minutes, reviewing PMH, review of last  clinic notes and assessment, wound assessment and debridement, patient education.         Please note that this note may have been created using voice recognition software. I have worked with technical experts from [facility] to optimize the interface.  I have made every reasonable attempt to correct obvious errors, but there may be errors of grammar and possibly content that I did not discover before finalizing the note.

## 2022-07-15 NOTE — WOUND TEAM
Pt presents today with bilateral plantar foot wounds. He stated that he has not had time to go to [facility] for the offloading shoes since he lives in [place]. Encouraged pt to go to [facility] after this visit as it is nearby and stressed the importance of offloading.    Pt stated that the silver hydrofiber was causing him pain and that he does not want that re-applied. MAGNUS Casillas, in to see pt and debride wound. Near the end of the visit, pt asked for supplies to change the dressing. Explained to pt the clinic policy and that we don't provide extra dressings for pt's besides the excess that has been opened from the visit. He also has home health that will provide dressings for their visits. Pt was upset and asked to see MAGNUS. MAGNUS approved the extra dressings for today but reminded pt that we will not provide supplies in the future. He verbalized understanding.    See wound care flowsheet

## 2022-07-16 NOTE — TELEPHONE ENCOUNTER
Received voicemail from patient stating that he received a text message from his home health nurse that orders to change dressings were only weekly, and patient states that he changes his dressings daily due to drainage and pain. Patient states that home health will only supply enough dressings to change daily if the order states daily dressing changes.     Order updated to state daily dressing changes faxed to [facility] #[phone].    Telephone call to patients listed number [phone] to inform him of changed home health order.   same name as above

## 2022-07-21 DIAGNOSIS — G47.00 INSOMNIA, UNSPECIFIED TYPE: ICD-10-CM

## 2022-07-27 RX ORDER — ZOLPIDEM TARTRATE 5 MG/1
TABLET ORAL
Qty: 30 TABLET | Refills: 3 | Status: SHIPPED | OUTPATIENT
Start: 2022-07-27 | End: 2022-08-26

## 2022-07-29 ENCOUNTER — TELEPHONE (OUTPATIENT)
Dept: WOUND CARE | Facility: MEDICAL CENTER | Age: 47
End: 2022-07-29
Payer: COMMERCIAL

## 2022-07-29 ENCOUNTER — APPOINTMENT (OUTPATIENT)
Dept: WOUND CARE | Facility: MEDICAL CENTER | Age: 47
End: 2022-07-29
Attending: NURSE PRACTITIONER
Payer: COMMERCIAL

## 2022-07-29 ENCOUNTER — TELEPHONE (OUTPATIENT)
Dept: PHARMACY | Facility: MEDICAL CENTER | Age: 47
End: 2022-07-29
Payer: COMMERCIAL

## 2022-07-29 NOTE — TELEPHONE ENCOUNTER
Outbound call first attempt to discuss a recent change made to pharmacy benefit plan regarding patient specialty medication.  No answer. Left voicemail.     Kristi Andrade  Rx Coordinator   [phone]

## 2022-07-29 NOTE — TELEPHONE ENCOUNTER
missed his recertification visit with [facility] so currently will need a new referral placed to receive Home Health.

## 2022-08-04 ENCOUNTER — OFFICE VISIT (OUTPATIENT)
Dept: WOUND CARE | Facility: MEDICAL CENTER | Age: 47
End: 2022-08-04
Attending: NURSE PRACTITIONER
Payer: COMMERCIAL

## 2022-08-04 DIAGNOSIS — L97.512 ULCER OF BOTH FEET WITH FAT LAYER EXPOSED (HCC): ICD-10-CM

## 2022-08-04 DIAGNOSIS — L97.512 CHRONIC FOOT ULCER, RIGHT, WITH FAT LAYER EXPOSED (HCC): ICD-10-CM

## 2022-08-04 DIAGNOSIS — L97.522 CHRONIC FOOT ULCER WITH FAT LAYER EXPOSED, LEFT (HCC): Primary | ICD-10-CM

## 2022-08-04 DIAGNOSIS — M06.372 RHEUMATOID NODULE, LEFT ANKLE AND FOOT (HCC): ICD-10-CM

## 2022-08-04 DIAGNOSIS — L08.9 WOUND INFECTION: ICD-10-CM

## 2022-08-04 DIAGNOSIS — T14.8XXA WOUND INFECTION: ICD-10-CM

## 2022-08-04 DIAGNOSIS — Z92.25 HISTORY OF IMMUNOSUPPRESSIVE THERAPY: ICD-10-CM

## 2022-08-04 DIAGNOSIS — L97.522 ULCER OF BOTH FEET WITH FAT LAYER EXPOSED (HCC): ICD-10-CM

## 2022-08-04 PROCEDURE — 11042 DBRDMT SUBQ TIS 1ST 20SQCM/<: CPT

## 2022-08-04 PROCEDURE — 11042 DBRDMT SUBQ TIS 1ST 20SQCM/<: CPT | Performed by: NURSE PRACTITIONER

## 2022-08-04 PROCEDURE — 99213 OFFICE O/P EST LOW 20 MIN: CPT

## 2022-08-04 RX ORDER — CIPROFLOXACIN 500 MG/1
500 TABLET, FILM COATED ORAL 2 TIMES DAILY
Qty: 14 TABLET | Refills: 0 | Status: SHIPPED | OUTPATIENT
Start: 2022-08-04 | End: 2022-08-11

## 2022-08-04 ASSESSMENT — ENCOUNTER SYMPTOMS
FEVER: 0
VOMITING: 0
SHORTNESS OF BREATH: 0
COUGH: 0
SPUTUM PRODUCTION: 0
HEADACHES: 0
DIZZINESS: 0
NAUSEA: 0
CHILLS: 0
DIARRHEA: 0
PALPITATIONS: 0

## 2022-08-04 NOTE — PATIENT INSTRUCTIONS
After Visit Summary Wound Care Instructions    Nutrition - Patient instructed increased protein diet unless contraindicated in renal failure (meat, eggs, fish, yogurt, cottage cheese, beans), use of multivitamin with minerals and Arginaid supplementation (check if ok with Primary Care Provider first).    Infection -  instructed signs and symptoms of infection, increased redness and swelling, localized heat over wound and surrounding area/fever/chills/nausea and vomiting, when to call doctor or go to Emergency Room.     Dressing Changes - Instructed patient rationale for wound care products. Instructed to keep dressings clean and dry, shower on clinic days right before coming in. Change your dressing if it becomes soiled, soaked, or falls off.      Dressing change procedure   Remove old dressing.  Cleanse the wound with saline and gauze.  Dry surrounding skin with gauze, then apply skin prep wipe, allow to dry, then apply zinc oxide barrier paste.  Cut a  piece of Hydrofera Blue dressing, and place this on top of wound.   Cover with foam dressing and secure with the hypafix tape if needed.   Change 1-2 x week and as needed at home, come to wound clinic 1 x week.   Change your dressing if it becomes soiled, soaked, or falls off.    Instructed patient about fall prevention.      Questions - should you have any questions regarding your home care instructions, please contact the wound center at [phone]. If after hours, contact your primary care physician or go to the hospital emergency room.

## 2022-08-04 NOTE — PROGRESS NOTES
Provider Encounter-full thickness wound      HISTORY OF PRESENT ILLNESS  Wound History:   START OF CARE IN CLINIC: 07/07/22   REFERRING PROVIDER: Brit Fitch   WOUND-full-thickness ulcers   LOCATION: Right plantar/medial midfoot                                   left plantar/medial midfoot   HISTORY: Patient with history of RA referred to [facility] for evaluation treatment of bilateral plantar foot ulcers.  States that the right plantar foot ulcer started in early 2021, is a small wound which gradually became bigger.  He has another ulcer to his left foot which healed at one time, but reopened in early March of this year.  He has been seeing a podiatrist, who was told him that the ulcers are due to RA nodules.  He was at one point referred to Dr. Pryor at [facility], who told him that his podiatrist would need to remove the nodules.  Podiatry states that that is not something she would do, has referred him back to [facility].      Pertinent Medical History: Rheumatoid arthritis, history of osteomyelitis, immunosuppressive therapy, anxiety disorder, MONICA, aortic root dilation, hypertension, paroxysmal atrial fibrillation, aortic stenosis      TOBACCO USE:   Current everyday smoker 0.25 packs/day smokes cigarettes and cigars    Patient's problem list, allergies, and current medications reviewed and updated in Epic    Interval History:    7/8/2022: Clinic visit with JONATHON Florian   states overall he is feeling well denies fever or chills.  Patient was seen at [facility] in March for the same wounds.  Back in March patient was to reschedule with Dr. Pryor from the [facility].  I do not see any notes in epic we will request last clinical note from Dr. Pryor, I believe the patient did not reschedule for follow-up.  He states he was previously told that his insurance would not cover offloading shoes because he is not a diabetic.  Patient is on his feet often due to 2 young sons that he cares for he reports his wife  is an APRN and he takes care of the kids.  He was previously given a prescription to [facility] for offloading shoes and orthotics in March however, he did not fill the prescription.  Patient reports that he has compression stockings but is not currently wearing them.    7/15/2022 : Clinic visit with MAGNUS Hdz, DUYEN, OMAR, CFNIMESH.   states that overall he feels okay.  He is accompanied today by his 4-year-old son.  He was unable to tolerate Hydrofiber silver dressing applied last visit, states that it caused burning sensation and was too wet.  He also presents today wearing regular tennis shoes.  He has not yet obtained off loading shoe/boot from orthotist.  He states he is planning to go to [facility] later today.  [facility] continues to see him in between clinic visits.  He has scheduled appointment to see Dr. Pryor at [facility], believes his appointment is in a few weeks.    8/4/2022: Clinic visit with MAGUNS Florian.  Patient reports increased purulent drainage from bilateral lower extremity wound.  Patient reports that the dressings had a yellowish tinge to them.  Patient reports he has had increased pain to the left wound site.  Patient has increased maceration.    REVIEW OF SYSTEMS:   Review of Systems   Constitutional: Negative for chills and fever.   Respiratory: Negative for cough, sputum production and shortness of breath.    Cardiovascular: Positive for leg swelling. Negative for chest pain and palpitations.   Gastrointestinal: Negative for diarrhea, nausea and vomiting.   Skin: Negative for itching and rash.        Increased breakdown around the wound beds   Neurological: Negative for dizziness and headaches.       PHYSICAL EXAMINATION:   There were no vitals taken for this visit.    Physical Exam  Constitutional:       Appearance: He is obese.   Cardiovascular:      Comments: Palpable pedal pulses, 2+ bilaterally  Pulmonary:      Effort: Pulmonary effort is normal. No  respiratory distress.      Breath sounds: No wheezing.   Musculoskeletal:      Right lower leg: Edema present.      Left lower leg: Edema present.      Comments: Charcot deformities of both feet  RA changes to hands and feet  Pitting edema/lymphedema to bilateral lower extremities   Skin:     Findings: Erythema present.      Comments: Full-thickness ulcers to bilateral plantar/medial, over bony prominences.  Increased maceration down to both periwound areas on bilateral feet    Refer to wound flowsheet and photos   Neurological:      General: No focal deficit present.      Mental Status: He is alert and oriented to person, place, and time.         WOUND ASSESSMENT  Wound 03/25/22 Full Thickness Wound Foot Left --Left Plantar Mid Foot (Active)   Wound Image   08/04/22 1500   Site Assessment Pink;Yellow;White 08/04/22 1500   Periwound Assessment Maceration;Callused 08/04/22 1500   Margins Unattached edges 08/04/22 1500   Drainage Amount Large 08/04/22 1500   Drainage Description Green;Yellow 08/04/22 1500   Treatments Cleansed;Provider debridement;Topical Lidocaine 08/04/22 1500   Wound Cleansing Puracyn [place] 08/04/22 1500   Periwound Protectant Barrier Paste;Skin Protectant Wipes to Periwound 08/04/22 1500   Dressing Cleansing/Solutions Not Applicable 07/15/22 1000   Dressing Options Hydrofera Blue Ready;Hypafix Tape;Silicone Adhesive Foam 08/04/22 1500   Dressing Changed Changed 08/04/22 1500   Dressing Change/Treatment Frequency Every 72 hrs, and As Needed 08/04/22 1500   Non-staged Wound Description Full thickness 08/04/22 1500   Wound Length (cm) 3 cm 08/04/22 1500   Wound Width (cm) 2.4 cm 08/04/22 1500   Wound Depth (cm) 0.7 cm 08/04/22 1500   Wound Surface Area (cm^2) 7.2 cm^2 08/04/22 1500   Wound Volume (cm^3) 5.04 cm^3 08/04/22 1500   Post-Procedure Length (cm) 3.1 cm 08/04/22 1500   Post-Procedure Width (cm) 2.5 cm 08/04/22 1500   Post-Procedure Depth (cm) 0.8 cm 08/04/22 1500   Post-Procedure Surface  Area (cm^2) 7.75 cm^2 08/04/22 1500   Post-Procedure Volume (cm^3) 6.2 cm^3 08/04/22 1500   Wound Healing % -1767 08/04/22 1500   Tunneling (cm) 0 cm 08/04/22 1500   Tunneling Clock Position of Wound 3 03/25/22 1115   Undermining (cm) 0 cm 08/04/22 1500   Wound Odor None 08/04/22 1500   Pulses Left;DP;PT;Doppler 07/07/22 1040   Exposed Structures None 08/04/22 1500   Number of days: 132       Wound 03/25/22 Full Thickness Wound Foot Right --Right Plantar Mid Foot (Active)   Wound Image   08/04/22 1500   Site Assessment Red;Yellow 08/04/22 1500   Periwound Assessment Maceration;Callused 08/04/22 1500   Margins Unattached edges 08/04/22 1500   Drainage Amount Large 08/04/22 1500   Drainage Description Green;Yellow 08/04/22 1500   Treatments Cleansed;Topical Lidocaine;Provider debridement 08/04/22 1500   Wound Cleansing Puracyn [place] 08/04/22 1500   Periwound Protectant Barrier Paste;Skin Protectant Wipes to Periwound 08/04/22 1500   Dressing Cleansing/Solutions Not Applicable 08/04/22 1500   Dressing Options Hydrofera Blue Ready;Hypafix Tape;Silicone Adhesive Foam 08/04/22 1500   Dressing Changed New 07/15/22 1000   Dressing Change/Treatment Frequency Every 72 hrs, and As Needed 08/04/22 1500   Non-staged Wound Description Full thickness 08/04/22 1500   Wound Length (cm) 1.4 cm 08/04/22 1500   Wound Width (cm) 1.5 cm 08/04/22 1500   Wound Depth (cm) 0.5 cm 08/04/22 1500   Wound Surface Area (cm^2) 2.1 cm^2 08/04/22 1500   Wound Volume (cm^3) 1.05 cm^3 08/04/22 1500   Post-Procedure Length (cm) 1.4 cm 08/04/22 1500   Post-Procedure Width (cm) 1.5 cm 08/04/22 1500   Post-Procedure Depth (cm) 0.6 cm 08/04/22 1500   Post-Procedure Surface Area (cm^2) 2.1 cm^2 08/04/22 1500   Post-Procedure Volume (cm^3) 1.26 cm^3 08/04/22 1500   Wound Healing % -176 08/04/22 1500   Tunneling (cm) 0 cm 08/04/22 1500   Undermining (cm) 0 cm 08/04/22 1500   Undermining of Wound, 1st Location From 4 o'clock;To 8 o'clock 07/15/22 1000   Wound  Odor None 08/04/22 1500   Pulses Right;DP;PT;Doppler 07/07/22 1040   Exposed Structures None 08/04/22 1500   Number of days: 132         PROCEDURE:   Excisional debridement of right and left plantar/medial foot ulcers  -2% viscous lidocaine applied topically to wound beds for approximately 5 minutes prior to debridement  -Curette used to debride periwound callus to skin level.  Total area of callus.  Approximately 4cm²  -Curette then used to debride wound beds.  Excisional debridement was performed to remove devitalized tissue until healthy, bleeding tissue was visualized.   Entire surface of wounds, 9.85 cm2 debrided.  Tissue debrided into the subcutaneous layer.    -Bleeding controlled with manual pressure.    -Wound care completed by wound RN, refer to flowsheet  -Patient tolerated the procedure well, without c/o pain or discomfort.     Pertinent Labs and Diagnostics:    Labs: N/A    IMAGING: Foot/Toes Imaging, Past Year DX-TOE(S) 2+ LEFT    Result Date: 8/18/2021  Narrative 8/18/2021 1:57 PM HISTORY/REASON FOR EXAM:  Pain/Deformity Following Trauma. TECHNIQUE/EXAM DESCRIPTION AND NUMBER OF VIEWS:  3 views of the LEFT toes. COMPARISON: January 1 FINDINGS: There is osteopenia in this decreases sensitivity of the study for the detection of fracture New first proximal phalanx base lucency seen medially extends to the metatarsal-phalangeal articular margin Third and fourth metatarsal neck fractures have healed with slight valgus angulation. Probable chronic fifth metatarsal head bunionectomy laterally, unchanged No subluxation     Impression Acute-subacute first proximal phalanx intra-articular base fracture is nondisplaced Healed third and fourth metatarsal neck fractures Severe osteopenia      VASCULAR STUDIES: No results found.     LAST  WOUND CULTURE:   Lab Results   Component Value Date/Time    CULTRSULT - (A) 05/04/2021 02:20 PM    CULTRSULT Serratia marcescens  Light growth   (A) 05/04/2021 02:20 PM          ASSESSMENT AND PLAN:     1. Chronic foot ulcer with fat layer exposed, left (HCC); Chronic foot ulcer, right, with fat layer exposed (HCC)  Comments: Right plantar foot wound present for over a year, left plantar foot ulcer reopened in early March 2022 08/04/22: Periwound areas have degraded and with increased maceration.  Patient has a significant amount of fluorescent yellow drainage from the wound beds.  This is evidenced on the dressings removed in clinic today.  Patient reports the dressings were placed this morning and that he has been having significant increase in drainage.    -Excisional debridement of periwound callus and the wound beds medically necessary to promote wound healing.  -Patient to return to clinic weekly for assessment debridement if necessary.  -Patient has home health they are to continue to change dressings 1-2 times per week.  Patient would benefit from twice weekly dressing changes from home health due to increased drainage and maceration.  -Again reiterated to the patient that offloading is the biggest key to healing these wounds.  I have given the patient a [facility] to [facility] for an offloading boot for left lower extremity and offloading shoe to the right lower extremity.  -Patient is positive for signs and symptoms of infection including increased maceration increased drainage, increased pain, mild periwound erythema and fluorescent yellow drainage.  -Patient has increased periwound maceration, degradation of the wound bed.  He has increased fluorescent yellow drainage from the wound beds, increased periwound erythema, increased pain.  We will therefore place the patient on Cipro to cover for Pseudomonas.  -I have asked the patient to complete an EKG prior to initiation of Cipro.  Last EKG was in 2020.  Patient has a history for first-degree AV block and nonspecific intraventricular conduction delay. QTC is within defined limits as of 2020.       Wound care: Hydrofera  Blue ready, silicone adhesive foam, Hypafix tape    2. Rheumatoid nodule, left ankle and foot (HCC); Rheumatoid nodule, right ankle and foot (HCC)\  Comments: Per patient, ulcers caused by underlying nodules.  Podiatrist does not want to operate, has referred patient back to [facility]    08/04/22: Patient reports that he has an appointment Dr. Pryor from the [facility].  Patient reports he does not remember the date of the appointment.  I am in agreement with previous assessments of the patient will more than likely need surgical intervention to help correct the deformities for long-term healing of these wounds.      3.  History of immunosuppressive therapy  Comments: Patient has a long history of immune suppressive therapy due to RA.  This is complicating wound healing.    08/04/22: Patient reports that he takes prednisone as needed for RA flareups.  Patient reports he is not currently on any steroids at this time.    PATIENT EDUCATION:  - Implications of loss of protective sensation (LOPS) discussed with patient- including increased risk for amputation.  - Advised to check feet at least daily, moisturize feet, and to always wear protective foot wear.   -  Importance of offloading foot to assist with wound healing  - Advised pt not to trim nails or calluses, seek foot/nail care from podiatrist or certified foot/nail nurse  - Importance of adequate nutrition for wound healing          Please note that this note may have been created using voice recognition software. I have worked with technical experts from [employer] to optimize the interface.  I have made every reasonable attempt to correct obvious errors, but there may be errors of grammar and possibly content that I did not discover before finalizing the note.

## 2022-08-04 NOTE — PROGRESS NOTES
Pt given Rx for [facility] for offloading shoe for R and boot for L. Signed by MAGNUS De Santiago. Orders faxed to [facility] (R[facility]) [facility].

## 2022-08-10 ENCOUNTER — TELEPHONE (OUTPATIENT)
Dept: WOUND CARE | Facility: MEDICAL CENTER | Age: 47
End: 2022-08-10
Payer: COMMERCIAL

## 2022-08-11 ENCOUNTER — HOSPITAL ENCOUNTER (OUTPATIENT)
Facility: MEDICAL CENTER | Age: 47
End: 2022-08-11
Attending: NURSE PRACTITIONER
Payer: COMMERCIAL

## 2022-08-11 ENCOUNTER — OFFICE VISIT (OUTPATIENT)
Dept: WOUND CARE | Facility: MEDICAL CENTER | Age: 47
End: 2022-08-11
Attending: NURSE PRACTITIONER
Payer: COMMERCIAL

## 2022-08-11 VITALS
TEMPERATURE: 98.3 F | SYSTOLIC BLOOD PRESSURE: 90 MMHG | RESPIRATION RATE: 20 BRPM | DIASTOLIC BLOOD PRESSURE: 61 MMHG | OXYGEN SATURATION: 95 % | HEART RATE: 73 BPM

## 2022-08-11 DIAGNOSIS — L97.512 ULCER OF BOTH FEET WITH FAT LAYER EXPOSED (HCC): ICD-10-CM

## 2022-08-11 DIAGNOSIS — L08.9 WOUND INFECTION: ICD-10-CM

## 2022-08-11 DIAGNOSIS — T14.8XXA WOUND INFECTION: ICD-10-CM

## 2022-08-11 DIAGNOSIS — M06.372 RHEUMATOID NODULE, LEFT ANKLE AND FOOT (HCC): ICD-10-CM

## 2022-08-11 DIAGNOSIS — L97.522 ULCER OF BOTH FEET WITH FAT LAYER EXPOSED (HCC): ICD-10-CM

## 2022-08-11 DIAGNOSIS — L97.522 CHRONIC FOOT ULCER WITH FAT LAYER EXPOSED, LEFT (HCC): ICD-10-CM

## 2022-08-11 DIAGNOSIS — Z92.25 HISTORY OF IMMUNOSUPPRESSIVE THERAPY: ICD-10-CM

## 2022-08-11 LAB
GRAM STN SPEC: NORMAL
SIGNIFICANT IND 70042: NORMAL
SITE SITE: NORMAL
SOURCE SOURCE: NORMAL

## 2022-08-11 PROCEDURE — 99212 OFFICE O/P EST SF 10 MIN: CPT | Mod: 25 | Performed by: NURSE PRACTITIONER

## 2022-08-11 PROCEDURE — 87070 CULTURE OTHR SPECIMN AEROBIC: CPT

## 2022-08-11 PROCEDURE — 99213 OFFICE O/P EST LOW 20 MIN: CPT

## 2022-08-11 PROCEDURE — 87186 SC STD MICRODIL/AGAR DIL: CPT | Mod: 91

## 2022-08-11 PROCEDURE — 11042 DBRDMT SUBQ TIS 1ST 20SQCM/<: CPT

## 2022-08-11 PROCEDURE — 87077 CULTURE AEROBIC IDENTIFY: CPT | Mod: 91

## 2022-08-11 PROCEDURE — 11042 DBRDMT SUBQ TIS 1ST 20SQCM/<: CPT | Performed by: NURSE PRACTITIONER

## 2022-08-11 PROCEDURE — 87205 SMEAR GRAM STAIN: CPT

## 2022-08-11 NOTE — PROGRESS NOTES
Provider Encounter-full thickness wound      HISTORY OF PRESENT ILLNESS  Wound History:   START OF CARE IN CLINIC: 07/07/22   REFERRING PROVIDER: Brit Fitch   WOUND-full-thickness ulcers   LOCATION: Right plantar/medial midfoot                                   left plantar/medial midfoot   HISTORY: Patient with history of RA referred to [facility] for evaluation treatment of bilateral plantar foot ulcers.  States that the right plantar foot ulcer started in early 2021, is a small wound which gradually became bigger.  He has another ulcer to his left foot which healed at one time, but reopened in early March of this year.  He has been seeing a podiatrist, who was told him that the ulcers are due to RA nodules.  He was at one point referred to Dr. Pryor at [facility], who told him that his podiatrist would need to remove the nodules.  Podiatry states that that is not something she would do, has referred him back to [facility].      Pertinent Medical History: Rheumatoid arthritis, history of osteomyelitis, immunosuppressive therapy, anxiety disorder, MONICA, aortic root dilation, hypertension, paroxysmal atrial fibrillation, aortic stenosis      TOBACCO USE:   Current everyday smoker 0.25 packs/day smokes cigarettes and cigars    Patient's problem list, allergies, and current medications reviewed and updated in Epic    Interval History:    7/8/2022: Clinic visit with JONATHON Florian   states overall he is feeling well denies fever or chills.  Patient was seen at [facility] in March for the same wounds.  Back in March patient was to reschedule with Dr. Pryor from the [facility].  I do not see any notes in epic we will request last clinical note from Dr. Pryor, I believe the patient did not reschedule for follow-up.  He states he was previously told that his insurance would not cover offloading shoes because he is not a diabetic.  Patient is on his feet often due to 2 young sons that he cares for he reports his wife  is an APRN and he takes care of the kids.  He was previously given a prescription to [facility] for offloading shoes and orthotics in March however, he did not fill the prescription.  Patient reports that he has compression stockings but is not currently wearing them.    7/15/2022 : Clinic visit with MAGNUS Hdz, DUYEN, OMAR, JAZMYN.   states that overall he feels okay.  He is accompanied today by his 4-year-old son.  He was unable to tolerate Hydrofiber silver dressing applied last visit, states that it caused burning sensation and was too wet.  He also presents today wearing regular tennis shoes.  He has not yet obtained off loading shoe/boot from orthotist.  He states he is planning to go to [facility] later today.  [facility] continues to see him in between clinic visits.  He has scheduled appointment to see Dr. Pryor at [facility], believes his appointment is in a few weeks.    8/4/2022: Clinic visit with MAGNUS Florian.  Patient reports increased purulent drainage from bilateral lower extremity wound.  Patient reports that the dressings had a yellowish tinge to them.  Patient reports he has had increased pain to the left wound site.  Patient has increased maceration.    8/11/2022 : Clinic visit with MAGNUS Hdz, DUYEN, OMAR, JAZMYN.  Patient states that he is feeling well overall.  He has not yet completed his EKG.  States that he tried to get this done in [place], was told there was no order.  He was provided with a printed requisition today.  As instructed, he is not yet picked up his Cipro.  He presents today with just a minimal amount of yellowish drainage, no odor.  Culture collected today after debridement of wound.  He states he has an appointment with Dr. Pryor at [facility] coming up, but is not certain of the date.  He continues to wear old orthotic shoes with inserts.  He is unable to offload to any significant degree.   He also informed me that [facility] is  requesting a new referral order.  Order placed through epic.      REVIEW OF SYSTEMS:   Unchanged from previous wound clinic visit on 8/4/2022    PHYSICAL EXAMINATION:   BP (!) 90/61 (BP Location: Right arm, Patient Position: Sitting) Comment: Rn notified  Pulse 73   Temp 36.8 °C (98.3 °F) (Temporal)   Resp 20   SpO2 95%     Physical Exam  Constitutional:       Appearance: He is obese.   Cardiovascular:      Comments: Palpable pedal pulses, 2+ bilaterally  Pulmonary:      Effort: Pulmonary effort is normal. No respiratory distress.      Breath sounds: No wheezing.   Musculoskeletal:      Right lower leg: Edema present.      Left lower leg: Edema present.      Comments: Charcot deformities of both feet  RA changes to hands and feet  Pitting edema/lymphedema to bilateral lower extremities   Skin:     Findings: Erythema present.      Comments: Full-thickness ulcers to bilateral plantar/medial, over bony prominences.  Area of both wounds has decreased since last assessment.  Minimal to moderate amount of yellowish clear drainage, no odor.  Periwound maceration under much better control, mild periwound calluses.    Refer to wound flowsheet and photos   Neurological:      General: No focal deficit present.      Mental Status: He is alert and oriented to person, place, and time.       WOUND ASSESSMENT  Wound 03/25/22 Full Thickness Wound Foot Left --Left Plantar Mid Foot (Active)   Wound Image    08/11/22 0930   Site Assessment Pink;Yellow 08/11/22 0930   Periwound Assessment Callused;Blanchable erythema 08/11/22 0930   Margins Unattached edges 08/11/22 0930   Drainage Amount Large 08/11/22 0930   Drainage Description Serosanguineous;Yellow 08/11/22 0930   Treatments Cleansed;Site care;Topical Lidocaine;Provider debridement 08/11/22 0930   Wound Cleansing Normal Saline Irrigation 08/11/22 0930   Periwound Protectant Barrier Paste 08/11/22 0930   Dressing Cleansing/Solutions Normal Saline 08/11/22 0930   Dressing Options  Collagen Dressing;Hydrofera Blue Ready;Silicone Adhesive Foam;Hypafix Tape 08/11/22 0930   Dressing Changed Changed 08/11/22 0930   Dressing Change/Treatment Frequency Every 72 hrs, and As Needed 08/04/22 1500   Non-staged Wound Description Full thickness 08/11/22 0930   Wound Length (cm) 2.9 cm 08/11/22 0930   Wound Width (cm) 2.2 cm 08/11/22 0930   Wound Depth (cm) 0.3 cm 08/11/22 0930   Wound Surface Area (cm^2) 6.38 cm^2 08/11/22 0930   Wound Volume (cm^3) 1.914 cm^3 08/11/22 0930   Post-Procedure Length (cm) 2.9 cm 08/11/22 0930   Post-Procedure Width (cm) 2.2 cm 08/11/22 0930   Post-Procedure Depth (cm) 0.3 cm 08/11/22 0930   Post-Procedure Surface Area (cm^2) 6.38 cm^2 08/11/22 0930   Post-Procedure Volume (cm^3) 1.914 cm^3 08/11/22 0930   Wound Healing % -609 08/11/22 0930   Tunneling (cm) 0 cm 08/11/22 0930   Tunneling Clock Position of Wound 3 03/25/22 1115   Undermining (cm) 0 cm 08/11/22 0930   Wound Odor None 08/11/22 0930   Pulses Left;DP;PT;Doppler 07/07/22 1040   Exposed Structures None 08/11/22 0930   Number of days: 139       Wound 03/25/22 Full Thickness Wound Foot Right --Right Plantar Mid Foot (Active)   Wound Image    08/11/22 0930   Site Assessment Red;Yellow 08/11/22 0930   Periwound Assessment Callused;Blanchable erythema 08/11/22 0930   Margins Unattached edges 08/11/22 0930   Drainage Amount Large 08/11/22 0930   Drainage Description Serosanguineous 08/11/22 0930   Treatments Cleansed;Site care;Topical Lidocaine;Provider debridement 08/11/22 0930   Wound Cleansing Normal Saline Irrigation 08/11/22 0930   Periwound Protectant Barrier Paste 08/11/22 0930   Dressing Cleansing/Solutions Normal Saline 08/11/22 0930   Dressing Options Collagen Dressing;Hydrofera Blue Ready;Silicone Adhesive Foam;Hypafix Tape 08/11/22 0930   Dressing Changed Changed 08/11/22 0930   Dressing Change/Treatment Frequency Every 72 hrs, and As Needed 08/04/22 1500   Non-staged Wound Description Full thickness 08/11/22  0930   Wound Length (cm) 1.2 cm 08/11/22 0930   Wound Width (cm) 1.5 cm 08/11/22 0930   Wound Depth (cm) 0.2 cm 08/11/22 0930   Wound Surface Area (cm^2) 1.8 cm^2 08/11/22 0930   Wound Volume (cm^3) 0.36 cm^3 08/11/22 0930   Post-Procedure Length (cm) 1.2 cm 08/11/22 0930   Post-Procedure Width (cm) 1.5 cm 08/11/22 0930   Post-Procedure Depth (cm) 0.2 cm 08/11/22 0930   Post-Procedure Surface Area (cm^2) 1.8 cm^2 08/11/22 0930   Post-Procedure Volume (cm^3) 0.36 cm^3 08/11/22 0930   Wound Healing % 5 08/11/22 0930   Tunneling (cm) 0 cm 08/11/22 0930   Undermining (cm) 0 cm 08/11/22 0930   Undermining of Wound, 1st Location From 4 o'clock;To 8 o'clock 07/15/22 1000   Wound Odor None 08/11/22 0930   Pulses Right;DP;PT;Doppler 07/07/22 1040   Exposed Structures None 08/11/22 0930   Number of days: 139         PROCEDURE:   Excisional debridement of right and left plantar/medial foot ulcers  -2% viscous lidocaine applied topically to wound beds for approximately 5 minutes prior to debridement  -Curette used to debride periwound callus to skin level.  Total area of callus.  Approximately 4cm²  -Curette then used to debride wound beds.  Excisional debridement was performed to remove devitalized tissue until healthy, bleeding tissue was visualized.   Entire surface of wounds, 8.18 cm2 debrided.  Tissue debrided into the subcutaneous layer.    -Bleeding controlled with manual pressure.   -Wound care completed by wound RN, refer to flowsheet  -Patient tolerated the procedure well, without c/o pain or discomfort.     Pertinent Labs and Diagnostics:    Labs: N/A    IMAGING: Foot/Toes Imaging, Past Year DX-TOE(S) 2+ LEFT    Result Date: 8/18/2021  Narrative 8/18/2021 1:57 PM HISTORY/REASON FOR EXAM:  Pain/Deformity Following Trauma. TECHNIQUE/EXAM DESCRIPTION AND NUMBER OF VIEWS:  3 views of the LEFT toes. COMPARISON: January 1 FINDINGS: There is osteopenia in this decreases sensitivity of the study for the detection of fracture  New first proximal phalanx base lucency seen medially extends to the metatarsal-phalangeal articular margin Third and fourth metatarsal neck fractures have healed with slight valgus angulation. Probable chronic fifth metatarsal head bunionectomy laterally, unchanged No subluxation     Impression Acute-subacute first proximal phalanx intra-articular base fracture is nondisplaced Healed third and fourth metatarsal neck fractures Severe osteopenia      VASCULAR STUDIES: No results found.     LAST  WOUND CULTURE:   Lab Results   Component Value Date/Time    CULTRSULT - (A) 05/04/2021 02:20 PM    CULTRSULT Serratia marcescens  Light growth   (A) 05/04/2021 02:20 PM         ASSESSMENT AND PLAN:     1. Chronic foot ulcer with fat layer exposed, left (HCC); Chronic foot ulcer, right, with fat layer exposed (HCC)  Comments: Right plantar foot wound present for over a year, left plantar foot ulcer reopened in early March 2022 8/11/2022: Area of both wounds has decreased since last assessment.  Periwound maceration significantly improved.  Minimal  to moderate amount of clear yellow drainage, no odor.  Patient has not yet started Cipro, as he did not get his EKG done.    -Excisional debridement of periwound callus and the wound beds medically necessary to promote wound healing.  -Patient to return to clinic weekly for assessment debridement if necessary.  -Patient has home health they are to continue to change dressings 2 times per week.  Increased frequency of nursing visits been helpful in managing drainage, and thus periwound maceration.  Home health requiring a new referral (recertification?).  -Reviewed importance of offloading these wounds in order to promote healing.  He has been provided with prescription for offloading boots several times, has not yet obtained these.  Possibly because not covered by his insurance because he is not diabetic.  -Culture collected in clinic today after debridement  -Patient was  provided with requisition for EKG.  I suggested that he get this done whether or not we decide to go with [facility].       Wound care: Collagen into the deepest areas of wound beds, Hydrofera Blue ready, silicone adhesive foam, Hypafix tape    2. Rheumatoid nodule, left ankle and foot (HCC); Rheumatoid nodule, right ankle and foot (HCC)\  Comments: Per patient, ulcers caused by underlying nodules.  Podiatrist does not want to operate, has referred patient back to [facility]    8/11/2022: Patient states that he has an upcoming appoint with Dr. Pryor at [facility], though does not know the exact date.  -Will likely require surgical excision of nodules      3.  History of immunosuppressive therapy  Comments: Patient has a long history of immune suppressive therapy due to RA.  This is complicating wound healing.    8/11/2022: Patient is currently on Humira weekly, prednisone as needed for flareups.    PATIENT EDUCATION:  - Implications of loss of protective sensation (LOPS) discussed with patient- including increased risk for amputation.  - Advised to check feet at least daily, moisturize feet, and to always wear protective foot wear.   -  Importance of offloading foot to assist with wound healing  - Advised pt not to trim nails or calluses, seek foot/nail care from podiatrist or certified foot/nail nurse  - Importance of adequate nutrition for wound healing      My total time spent caring for the patient on the day of the encounter was 20 minutes, reviewing last clinical notes, discussing need for EKG, wound assessment and debridement, wound culture, discussion of obstacles to healing these wounds, review of treatment plan..       Please note that this note may have been created using voice recognition software. I have worked with technical experts from [employer] to optimize the interface.  I have made every reasonable attempt to correct obvious errors, but there may be errors of grammar and possibly content that I did not  discover before finalizing the note.

## 2022-08-11 NOTE — PATIENT INSTRUCTIONS
Should you experience any significant changes in your wound(s) such as infection (redness, swelling, localized heat, increased pain, fever >101 F, chills) or have any questions regarding your home care instructions, please contact the wound center [phone]. If after hours, contact your primary care physician or go the hospital emergency room.  Keep dressing clean and dry and cover while bathing. Only change dressing if over saturated, soiled or its falling off.

## 2022-08-15 LAB
BACTERIA WND AEROBE CULT: ABNORMAL
GRAM STN SPEC: ABNORMAL
SIGNIFICANT IND 70042: ABNORMAL
SITE SITE: ABNORMAL
SOURCE SOURCE: ABNORMAL

## 2022-08-16 DIAGNOSIS — T14.8XXA WOUND INFECTION: ICD-10-CM

## 2022-08-16 DIAGNOSIS — L08.9 WOUND INFECTION: ICD-10-CM

## 2022-08-16 RX ORDER — LINEZOLID 600 MG/1
600 TABLET, FILM COATED ORAL 2 TIMES DAILY
Qty: 20 TABLET | Refills: 0 | Status: SHIPPED
Start: 2022-08-16 | End: 2023-01-01

## 2022-08-16 NOTE — PROGRESS NOTES
Culture results reviewed.  Zyvox appropriate for treating.  Warning message stating that Zyvox may interact with Cymbalta.  I contacted patient.  He informed me that he has not taken Cymbalta in over a year.  Rx sent to patient's pharmacy.  I warned him that pharmacy may push back on this, but advised him to tell them he is no longer on Cymbalta.

## 2022-08-18 ENCOUNTER — OFFICE VISIT (OUTPATIENT)
Dept: WOUND CARE | Facility: MEDICAL CENTER | Age: 47
End: 2022-08-18
Attending: NURSE PRACTITIONER
Payer: COMMERCIAL

## 2022-08-18 DIAGNOSIS — L97.522 CHRONIC FOOT ULCER WITH FAT LAYER EXPOSED, LEFT (HCC): ICD-10-CM

## 2022-08-18 NOTE — PROGRESS NOTES
"Patient states he called the clinic \"30x times\" this morning and left several messages that he will not be in for his appt today r/t  his son is sick, not sure if it is COVID or not.     Patient decline being transferred to [facility] at this time and reports that he will call back later to reschedule appt.   "

## 2022-08-23 ENCOUNTER — TELEPHONE (OUTPATIENT)
Dept: WOUND CARE | Facility: MEDICAL CENTER | Age: 47
End: 2022-08-23
Payer: COMMERCIAL

## 2022-08-23 NOTE — TELEPHONE ENCOUNTER
Gama Peters RN requested referral sent and last visit note and primary note, all faxed to [phone].

## 2022-08-25 ENCOUNTER — TELEPHONE (OUTPATIENT)
Dept: WOUND CARE | Facility: MEDICAL CENTER | Age: 47
End: 2022-08-25
Payer: COMMERCIAL

## 2022-08-25 NOTE — TELEPHONE ENCOUNTER
Riley Peters RN called for specific wound care  orders, refaxed orders from last visit, 8/11/22. Cancelled last weeks appointment and next scheduled is 9/1/2022.

## 2022-08-29 ENCOUNTER — TELEPHONE (OUTPATIENT)
Dept: MEDICAL GROUP | Facility: MEDICAL CENTER | Age: 47
End: 2022-08-29
Payer: COMMERCIAL

## 2022-08-29 NOTE — TELEPHONE ENCOUNTER
[facility], Josué called and lvm stating that she went to pts home andhas stated that pt is not home bound and can dress his own wound and that the only reason pt wanted HH was for them to supply him his supplies. Josué stated that they can no longer be seeing this pt b/c there's no medical necessity of him requiring any HH services.

## 2022-08-30 DIAGNOSIS — M05.79 RHEUMATOID ARTHRITIS INVOLVING MULTIPLE SITES WITH POSITIVE RHEUMATOID FACTOR (HCC): ICD-10-CM

## 2022-08-30 DIAGNOSIS — L97.529 ULCER OF BOTH FEET, UNSPECIFIED ULCER STAGE (HCC): ICD-10-CM

## 2022-08-30 DIAGNOSIS — L97.519 ULCER OF BOTH FEET, UNSPECIFIED ULCER STAGE (HCC): ICD-10-CM

## 2022-08-30 NOTE — TELEPHONE ENCOUNTER
Called and spoke to Jousé to let her know we've received her msj. She did mention that she has contacted wound care and that wound care will now on supply the pt with wound supplies.

## 2022-09-01 ENCOUNTER — OFFICE VISIT (OUTPATIENT)
Dept: WOUND CARE | Facility: MEDICAL CENTER | Age: 47
End: 2022-09-01
Attending: NURSE PRACTITIONER
Payer: COMMERCIAL

## 2022-09-01 VITALS
SYSTOLIC BLOOD PRESSURE: 107 MMHG | DIASTOLIC BLOOD PRESSURE: 68 MMHG | OXYGEN SATURATION: 94 % | TEMPERATURE: 97.6 F | HEART RATE: 71 BPM | RESPIRATION RATE: 20 BRPM

## 2022-09-01 DIAGNOSIS — L97.522 CHRONIC FOOT ULCER WITH FAT LAYER EXPOSED, LEFT (HCC): ICD-10-CM

## 2022-09-01 DIAGNOSIS — M06.372 RHEUMATOID NODULE, LEFT ANKLE AND FOOT (HCC): ICD-10-CM

## 2022-09-01 DIAGNOSIS — M06.371 RHEUMATOID NODULE, RIGHT ANKLE AND FOOT (HCC): ICD-10-CM

## 2022-09-01 DIAGNOSIS — L97.512 CHRONIC FOOT ULCER, RIGHT, WITH FAT LAYER EXPOSED (HCC): ICD-10-CM

## 2022-09-01 DIAGNOSIS — Z92.25 HISTORY OF IMMUNOSUPPRESSIVE THERAPY: ICD-10-CM

## 2022-09-01 PROCEDURE — 11042 DBRDMT SUBQ TIS 1ST 20SQCM/<: CPT | Performed by: STUDENT IN AN ORGANIZED HEALTH CARE EDUCATION/TRAINING PROGRAM

## 2022-09-01 PROCEDURE — 11042 DBRDMT SUBQ TIS 1ST 20SQCM/<: CPT

## 2022-09-01 NOTE — PROGRESS NOTES
Wound care supply order sent to [facility]     Wound 03/25/22 Full Thickness Wound Foot Left --Left Plantar Mid Foot (Active)   Wound Image    09/01/22 1100   Site Assessment Pink;Yellow 09/01/22 1100   Periwound Assessment Callused;Blanchable erythema 09/01/22 1100   Margins Unattached edges 09/01/22 1100   Drainage Amount Large 09/01/22 1100   Drainage Description Serosanguineous 09/01/22 1100   Treatments Cleansed;Topical Lidocaine;Provider debridement 09/01/22 1100   Wound Cleansing Normal Saline Irrigation 09/01/22 1100   Periwound Protectant Barrier Paste;Skin Protectant Wipes to Periwound;Skin Moisturizer 09/01/22 1100   Dressing Cleansing/Solutions Not Applicable 09/01/22 1100   Dressing Options Hydrofera Blue Ready;Silicone Adhesive Foam;Tubigrip 09/01/22 1100   Dressing Changed New 09/01/22 1100   Dressing Change/Treatment Frequency Every 72 hrs, and As Needed 09/01/22 1100   Non-staged Wound Description Full thickness 09/01/22 1100   Wound Length (cm) 3 cm 09/01/22 1100   Wound Width (cm) 1.9 cm 09/01/22 1100   Wound Depth (cm) 0.2 cm 09/01/22 1100   Wound Surface Area (cm^2) 5.7 cm^2 09/01/22 1100   Wound Volume (cm^3) 1.14 cm^3 09/01/22 1100   Post-Procedure Length (cm) 3 cm 09/01/22 1100   Post-Procedure Width (cm) 2.1 cm 09/01/22 1100   Post-Procedure Depth (cm) 0.3 cm 09/01/22 1100   Post-Procedure Surface Area (cm^2) 6.3 cm^2 09/01/22 1100   Post-Procedure Volume (cm^3) 1.89 cm^3 09/01/22 1100   Wound Healing % -322 09/01/22 1100   Tunneling (cm) 0 cm 09/01/22 1100   Undermining (cm) 0 cm 09/01/22 1100   Wound Odor None 09/01/22 1100   Pulses Left;DP;PT;Doppler 07/07/22 1040   Exposed Structures None 09/01/22 1100       Wound 03/25/22 Full Thickness Wound Foot Right --Right Plantar Mid Foot (Active)   Wound Image    09/01/22 1100   Site Assessment Red;Yellow 09/01/22 1100   Periwound Assessment Callused;Blanchable erythema 09/01/22 1100   Margins Unattached edges 09/01/22 1100   Drainage Amount Large  09/01/22 1100   Drainage Description Serosanguineous 09/01/22 1100   Treatments Cleansed;Topical Lidocaine;Provider debridement 09/01/22 1100   Wound Cleansing Normal Saline Irrigation 09/01/22 1100   Periwound Protectant Barrier Paste;Skin Protectant Wipes to Periwound;Skin Moisturizer 09/01/22 1100   Dressing Cleansing/Solutions Not Applicable 09/01/22 1100   Dressing Options Hydrofera Blue Ready;Silicone Adhesive Foam;Tubigrip 09/01/22 1100   Dressing Changed New 09/01/22 1100   Dressing Change/Treatment Frequency Every 72 hrs, and As Needed 09/01/22 1100   Non-staged Wound Description Full thickness 08/11/22 0930   Wound Length (cm) 1.3 cm 09/01/22 1100   Wound Width (cm) 1.4 cm 09/01/22 1100   Wound Depth (cm) 0.2 cm 09/01/22 1100   Wound Surface Area (cm^2) 1.82 cm^2 09/01/22 1100   Wound Volume (cm^3) 0.364 cm^3 09/01/22 1100   Post-Procedure Length (cm) 1.4 cm 09/01/22 1100   Post-Procedure Width (cm) 1.5 cm 09/01/22 1100   Post-Procedure Depth (cm) 0.3 cm 09/01/22 1100   Post-Procedure Surface Area (cm^2) 2.1 cm^2 09/01/22 1100   Post-Procedure Volume (cm^3) 0.63 cm^3 09/01/22 1100   Wound Healing % 4 09/01/22 1100   Tunneling (cm) 0 cm 09/01/22 1100   Undermining (cm) 0 cm 09/01/22 1100   Undermining of Wound, 1st Location From 4 o'clock;To 8 o'clock 07/15/22 1000   Wound Odor None 09/01/22 1100   Pulses Right;DP;PT;Doppler 07/07/22 1040   Exposed Structures None 09/01/22 1100

## 2022-09-01 NOTE — PATIENT INSTRUCTIONS
-Keep your wound dressing clean, dry, and intact.    -Change your dressing if it becomes soiled, soaked, or falls off.    -Remove your compression stockings if you have severe pain, severe swelling, numbness, color change, or temperature change in your toes. If you need to remove your compression wrap, do so by unrolling it. Do not cut the compression wrap off to prevent cutting yourself on accident.    -Should you experience any significant changes in your wound(s), such as infection (redness, swelling, localized heat, increased pain, fever > 101 F, chills) or have any questions regarding your home care instructions, please contact the wound center at [phone]. If after hours, contact your primary care physician or go to the hospital emergency room.

## 2022-09-01 NOTE — PROGRESS NOTES
Provider Encounter-full thickness wound      HISTORY OF PRESENT ILLNESS  Wound History:   START OF CARE IN CLINIC: 07/07/22   REFERRING PROVIDER: Brit Fitch   WOUND-full-thickness ulcers   LOCATION: Right plantar/medial midfoot                                   left plantar/medial midfoot   HISTORY: Patient with history of RA referred to [facility] for evaluation treatment of bilateral plantar foot ulcers.  States that the right plantar foot ulcer started in early 2021, is a small wound which gradually became bigger.  He has another ulcer to his left foot which healed at one time, but reopened in early March of this year.  He has been seeing a podiatrist, who was told him that the ulcers are due to RA nodules.  He was at one point referred to Dr. Pryor at [facility], who told him that his podiatrist would need to remove the nodules.  Podiatry states that that is not something she would do, has referred him back to [facility].      Pertinent Medical History: Rheumatoid arthritis, history of osteomyelitis, immunosuppressive therapy, anxiety disorder, MONICA, aortic root dilation, hypertension, paroxysmal atrial fibrillation, aortic stenosis      TOBACCO USE:   Current everyday smoker 0.25 packs/day smokes cigarettes and cigars    Patient's problem list, allergies, and current medications reviewed and updated in Epic    Interval History:    7/8/2022: Clinic visit with JONATHON Florian   states overall he is feeling well denies fever or chills.  Patient was seen at [facility] in March for the same wounds.  Back in March patient was to reschedule with Dr. Pryor from the [facility].  I do not see any notes in epic we will request last clinical note from Dr. Pryor, I believe the patient did not reschedule for follow-up.  He states he was previously told that his insurance would not cover offloading shoes because he is not a diabetic.  Patient is on his feet often due to 2 young sons that he cares for he reports his wife  is an APRN and he takes care of the kids.  He was previously given a prescription to [facility] for offloading shoes and orthotics in March however, he did not fill the prescription.  Patient reports that he has compression stockings but is not currently wearing them.    7/15/2022 : Clinic visit with MAGNUS Hdz, DUYEN, OMAR, JAZMYN.   states that overall he feels okay.  He is accompanied today by his 4-year-old son.  He was unable to tolerate Hydrofiber silver dressing applied last visit, states that it caused burning sensation and was too wet.  He also presents today wearing regular tennis shoes.  He has not yet obtained off loading shoe/boot from orthotist.  He states he is planning to go to [facility] later today.  [facility] continues to see him in between clinic visits.  He has scheduled appointment to see Dr. Pryor at [facility], believes his appointment is in a few weeks.    8/4/2022: Clinic visit with MAGNUS Florian.  Patient reports increased purulent drainage from bilateral lower extremity wound.  Patient reports that the dressings had a yellowish tinge to them.  Patient reports he has had increased pain to the left wound site.  Patient has increased maceration.    8/11/2022 : Clinic visit with MAGNUS Hdz, DUYEN, OMAR, JAZMYN.  Patient states that he is feeling well overall.  He has not yet completed his EKG.  States that he tried to get this done in [place], was told there was no order.  He was provided with a printed requisition today.  As instructed, he is not yet picked up his Cipro.  He presents today with just a minimal amount of yellowish drainage, no odor.  Culture collected today after debridement of wound.  He states he has an appointment with Dr. Pryor at [facility] coming up, but is not certain of the date.  He continues to wear old orthotic shoes with inserts.  He is unable to offload to any significant degree.   He also informed me that [facility] is  requesting a new referral order.  Order placed through [facility].    9/1/2022: Clinic visit with Joni Jewell MD. Patient reports doing ok. He denies any fevers or chills. Patient endorses some increased pain in feet from RA. Unfortunately, patient was not accepted by home health, he is too independent. Nursing ordering supplies today in clinic. Patient presents without compression today with large amount of lower extremity edema discussed need for compression. He reports heavy serous drainage from wounds, has been changing dressings daily. I recommended changing to Hydrofiber for increased drainage management, however patient strongly prefers hydrofera blue and silicone adhesive foam due to sensitivities to Hydrofiber in past and tape. Has appointment with [facility] on 9/13. Has offloading shoe right foot and reports that [facility] working with him to offload left foot, reports will  offloading boot today.      REVIEW OF SYSTEMS:   Unchanged from previous wound clinic visit on 8/11/2022    PHYSICAL EXAMINATION:   /68 (BP Location: Left arm, Patient Position: Sitting, BP Cuff Size: Large adult)   Pulse 71   Temp 36.4 °C (97.6 °F) (Temporal)   Resp 20   SpO2 94%     Physical Exam  Constitutional:       Appearance: He is obese.   Cardiovascular:      Comments: Palpable pedal pulses, 2+ bilaterally  Pulmonary:      Effort: Pulmonary effort is normal. No respiratory distress.      Breath sounds: No wheezing.   Musculoskeletal:      Right lower leg: Edema present.      Left lower leg: Edema present.      Comments: Charcot deformities of both feet  RA changes to hands and feet  Pitting edema/lymphedema to bilateral lower extremities   Skin:     Findings: Erythema present.      Comments: Full-thickness ulcers to bilateral plantar/medial, over bony prominences - Area of both wounds are largely unchanged since last assessment. Reports heavy serous drainage, does not appear infected.    Refer to wound flowsheet and  photos   Neurological:      General: No focal deficit present.      Mental Status: He is alert and oriented to person, place, and time.       WOUND ASSESSMENT  Wound 03/25/22 Full Thickness Wound Foot Left --Left Plantar Mid Foot (Active)   Wound Image    09/01/22 1100   Site Assessment Pink;Yellow 09/01/22 1100   Periwound Assessment Callused;Blanchable erythema 09/01/22 1100   Margins Unattached edges 09/01/22 1100   Drainage Amount Large 09/01/22 1100   Drainage Description Serosanguineous 09/01/22 1100   Treatments Cleansed;Topical Lidocaine;Provider debridement 09/01/22 1100   Wound Cleansing Normal Saline Irrigation 09/01/22 1100   Periwound Protectant Barrier Paste;Skin Protectant Wipes to Periwound;Skin Moisturizer 09/01/22 1100   Dressing Cleansing/Solutions Not Applicable 09/01/22 1100   Dressing Options Hydrofera Blue Ready;Silicone Adhesive Foam;Tubigrip 09/01/22 1100   Dressing Changed New 09/01/22 1100   Dressing Change/Treatment Frequency Every 72 hrs, and As Needed 09/01/22 1100   Non-staged Wound Description Full thickness 09/01/22 1100   Wound Length (cm) 3 cm 09/01/22 1100   Wound Width (cm) 1.9 cm 09/01/22 1100   Wound Depth (cm) 0.2 cm 09/01/22 1100   Wound Surface Area (cm^2) 5.7 cm^2 09/01/22 1100   Wound Volume (cm^3) 1.14 cm^3 09/01/22 1100   Post-Procedure Length (cm) 3 cm 09/01/22 1100   Post-Procedure Width (cm) 2.1 cm 09/01/22 1100   Post-Procedure Depth (cm) 0.3 cm 09/01/22 1100   Post-Procedure Surface Area (cm^2) 6.3 cm^2 09/01/22 1100   Post-Procedure Volume (cm^3) 1.89 cm^3 09/01/22 1100   Wound Healing % -322 09/01/22 1100   Tunneling (cm) 0 cm 09/01/22 1100   Undermining (cm) 0 cm 09/01/22 1100   Wound Odor None 09/01/22 1100   Pulses Left;DP;PT;Doppler 07/07/22 1040   Exposed Structures None 09/01/22 1100   Number of days: 160       Wound 03/25/22 Full Thickness Wound Foot Right --Right Plantar Mid Foot (Active)   Wound Image    09/01/22 1100   Site Assessment Red;Yellow 09/01/22  1100   Periwound Assessment Callused;Blanchable erythema 09/01/22 1100   Margins Unattached edges 09/01/22 1100   Drainage Amount Large 09/01/22 1100   Drainage Description Serosanguineous 09/01/22 1100   Treatments Cleansed;Topical Lidocaine;Provider debridement 09/01/22 1100   Wound Cleansing Normal Saline Irrigation 09/01/22 1100   Periwound Protectant Barrier Paste;Skin Protectant Wipes to Periwound;Skin Moisturizer 09/01/22 1100   Dressing Cleansing/Solutions Not Applicable 09/01/22 1100   Dressing Options Hydrofera Blue Ready;Silicone Adhesive Foam;Tubigrip 09/01/22 1100   Dressing Changed New 09/01/22 1100   Dressing Change/Treatment Frequency Every 72 hrs, and As Needed 09/01/22 1100   Non-staged Wound Description Full thickness 08/11/22 0930   Wound Length (cm) 1.3 cm 09/01/22 1100   Wound Width (cm) 1.4 cm 09/01/22 1100   Wound Depth (cm) 0.2 cm 09/01/22 1100   Wound Surface Area (cm^2) 1.82 cm^2 09/01/22 1100   Wound Volume (cm^3) 0.364 cm^3 09/01/22 1100   Post-Procedure Length (cm) 1.4 cm 09/01/22 1100   Post-Procedure Width (cm) 1.5 cm 09/01/22 1100   Post-Procedure Depth (cm) 0.3 cm 09/01/22 1100   Post-Procedure Surface Area (cm^2) 2.1 cm^2 09/01/22 1100   Post-Procedure Volume (cm^3) 0.63 cm^3 09/01/22 1100   Wound Healing % 4 09/01/22 1100   Tunneling (cm) 0 cm 09/01/22 1100   Undermining (cm) 0 cm 09/01/22 1100   Undermining of Wound, 1st Location From 4 o'clock;To 8 o'clock 07/15/22 1000   Wound Odor None 09/01/22 1100   Pulses Right;DP;PT;Doppler 07/07/22 1040   Exposed Structures None 09/01/22 1100   Number of days: 160         PROCEDURE:   Excisional debridement of right and left plantar/medial foot ulcers  -2% viscous lidocaine applied topically to wound beds for approximately 5 minutes prior to debridement  -Curette used to debride wound beds and open periwound.  Excisional debridement was performed to remove devitalized tissue until healthy, bleeding tissue was visualized.   Entire surface  of wounds, 8.4 cm2 debrided.  Tissue debrided into the subcutaneous layer.    -Bleeding controlled with manual pressure.   -Wound care completed by wound RN, refer to flowsheet  -Patient tolerated the procedure well, without c/o pain or discomfort.     Pertinent Labs and Diagnostics:    Labs: N/A    IMAGING: Foot/Toes Imaging, Past Year DX-TOE(S) 2+ LEFT    Result Date: 8/18/2021  Narrative 8/18/2021 1:57 PM HISTORY/REASON FOR EXAM:  Pain/Deformity Following Trauma. TECHNIQUE/EXAM DESCRIPTION AND NUMBER OF VIEWS:  3 views of the LEFT toes. COMPARISON: January 1 FINDINGS: There is osteopenia in this decreases sensitivity of the study for the detection of fracture New first proximal phalanx base lucency seen medially extends to the metatarsal-phalangeal articular margin Third and fourth metatarsal neck fractures have healed with slight valgus angulation. Probable chronic fifth metatarsal head bunionectomy laterally, unchanged No subluxation     Impression Acute-subacute first proximal phalanx intra-articular base fracture is nondisplaced Healed third and fourth metatarsal neck fractures Severe osteopenia      VASCULAR STUDIES: No results found.     LAST  WOUND CULTURE:    8/11/2022 See results      ASSESSMENT AND PLAN:     1. Chronic foot ulcer with fat layer exposed, left (HCC); Chronic foot ulcer, right, with fat layer exposed (HCC)  Comments: Right plantar foot wound present for over a year, left plantar foot ulcer reopened in early March 2022 9/1/2022: Area of both wounds is largely unchanged. Moderate to high serous drainage reported by patient, maceration has improved.  -Excisional debridement was performed in clinic, medically necessary to promote wound healing.  -Patient to return to clinic weekly for assessment debridement if necessary.  -Patient unable to continue home health, he is too active for home health. Will ordered wound care supplies from [facility]  -Reviewed importance of offloading these  wounds in order to promote healing. He is inadequately offloading. Has offloading shoe for right foot, wearing normal shoe on left. He reports working with [facility] to obtain offloading boots, reports they have been delivered to [facility] and plans to  today.  -Wound culture on 8/11 positive for Moderate growth Strep group B and MRSA, completed course of Zyvox. Light growth pseudomonas, however no evidence of pseudomonal infection today with his improved management of drainage.  - Has appointment with Dr. Pryor 9/13.  - Tubigrip for edema management, does not tolerate further compression. May need to increase compression on future clinic if tolerating tubigrip.     Wound care: Hydrofera Blue ready, silicone adhesive foam, Hypafix tape    2. Rheumatoid nodule, left ankle and foot (HCC); Rheumatoid nodule, right ankle and foot (HCC)\  Comments: Per patient, ulcers caused by underlying nodules.  Podiatrist does not want to operate, has referred patient back to [facility]    9/1/2022: Patient states that he has an upcoming appoint with Dr. Pryor at [facility], 9/13  -Will likely require surgical excision of nodules      3.  History of immunosuppressive therapy  Comments: Patient has a long history of immune suppressive therapy due to RA.  This is complicating wound healing.    9/1/2022: Patient is currently on Humira weekly, prednisone as needed for flareups.    PATIENT EDUCATION:  - Implications of loss of protective sensation (LOPS) discussed with patient- including increased risk for amputation.  - Advised to check feet at least daily, moisturize feet, and to always wear protective foot wear.   -  Importance of offloading foot to assist with wound healing  - Advised pt not to trim nails or calluses, seek foot/nail care from podiatrist or certified foot/nail nurse  - Importance of adequate nutrition for wound healing    Please note that this note may have been created using voice recognition software. I have worked  with technical experts from [facility] to optimize the interface.  I have made every reasonable attempt to correct obvious errors, but there may be errors of grammar and possibly content that I did not discover before finalizing the note.

## 2022-09-08 ENCOUNTER — OFFICE VISIT (OUTPATIENT)
Dept: WOUND CARE | Facility: MEDICAL CENTER | Age: 47
End: 2022-09-08
Attending: NURSE PRACTITIONER
Payer: COMMERCIAL

## 2022-09-08 VITALS
DIASTOLIC BLOOD PRESSURE: 62 MMHG | HEART RATE: 67 BPM | SYSTOLIC BLOOD PRESSURE: 101 MMHG | RESPIRATION RATE: 20 BRPM | TEMPERATURE: 97.7 F | OXYGEN SATURATION: 97 %

## 2022-09-08 DIAGNOSIS — L97.522 CHRONIC FOOT ULCER WITH FAT LAYER EXPOSED, LEFT (HCC): ICD-10-CM

## 2022-09-08 DIAGNOSIS — M06.372 RHEUMATOID NODULE, LEFT ANKLE AND FOOT (HCC): ICD-10-CM

## 2022-09-08 DIAGNOSIS — Z92.25 HISTORY OF IMMUNOSUPPRESSIVE THERAPY: ICD-10-CM

## 2022-09-08 DIAGNOSIS — M06.371 RHEUMATOID NODULE, RIGHT ANKLE AND FOOT (HCC): ICD-10-CM

## 2022-09-08 DIAGNOSIS — L97.512 CHRONIC FOOT ULCER, RIGHT, WITH FAT LAYER EXPOSED (HCC): ICD-10-CM

## 2022-09-08 DIAGNOSIS — B36.9 FUNGAL DERMATITIS: ICD-10-CM

## 2022-09-08 PROCEDURE — 11042 DBRDMT SUBQ TIS 1ST 20SQCM/<: CPT

## 2022-09-08 PROCEDURE — 11042 DBRDMT SUBQ TIS 1ST 20SQCM/<: CPT | Performed by: STUDENT IN AN ORGANIZED HEALTH CARE EDUCATION/TRAINING PROGRAM

## 2022-09-08 RX ORDER — FLUCONAZOLE 150 MG/1
150 TABLET ORAL
Qty: 3 TABLET | Refills: 0 | Status: SHIPPED | OUTPATIENT
Start: 2022-09-08 | End: 2022-09-15

## 2022-09-08 ASSESSMENT — ENCOUNTER SYMPTOMS
FEVER: 0
MYALGIAS: 1
CHILLS: 0

## 2022-09-08 NOTE — PROGRESS NOTES
Provider Encounter-full thickness wound      HISTORY OF PRESENT ILLNESS  Wound History:   START OF CARE IN CLINIC: 07/07/22   REFERRING PROVIDER: Brit Fitch   WOUND-full-thickness ulcers   LOCATION: Right plantar/medial midfoot                                   left plantar/medial midfoot   HISTORY: Patient with history of RA referred to [facility] for evaluation treatment of bilateral plantar foot ulcers.  States that the right plantar foot ulcer started in early 2021, is a small wound which gradually became bigger.  He has another ulcer to his left foot which healed at one time, but reopened in early March of this year.  He has been seeing a podiatrist, who was told him that the ulcers are due to RA nodules.  He was at one point referred to Dr. Pryor at [facility], who told him that his podiatrist would need to remove the nodules.  Podiatry states that that is not something she would do, has referred him back to [facility].      Pertinent Medical History: Rheumatoid arthritis, history of osteomyelitis, immunosuppressive therapy, anxiety disorder, MONICA, aortic root dilation, hypertension, paroxysmal atrial fibrillation, aortic stenosis      TOBACCO USE:   Current everyday smoker 0.25 packs/day smokes cigarettes and cigars    Patient's problem list, allergies, and current medications reviewed and updated in Epic    Interval History:    7/8/2022: Clinic visit with JONATHON Florian   states overall he is feeling well denies fever or chills.  Patient was seen at [facility] in March for the same wounds.  Back in March patient was to reschedule with Dr. Pryor from the [facility].  I do not see any notes in epic we will request last clinical note from Dr. Pryor, I believe the patient did not reschedule for follow-up.  He states he was previously told that his insurance would not cover offloading shoes because he is not a diabetic.  Patient is on his feet often due to 2 young sons that he cares for he reports his wife  is an APRN and he takes care of the kids.  He was previously given a prescription to [facility] for offloading shoes and orthotics in March however, he did not fill the prescription.  Patient reports that he has compression stockings but is not currently wearing them.    7/15/2022 : Clinic visit with MAGNUS Hdz, DUYEN, OMAR, JAZMYN.   states that overall he feels okay.  He is accompanied today by his 4-year-old son.  He was unable to tolerate Hydrofiber silver dressing applied last visit, states that it caused burning sensation and was too wet.  He also presents today wearing regular tennis shoes.  He has not yet obtained off loading shoe/boot from orthotist.  He states he is planning to go to [facility] later today.  [facility] continues to see him in between clinic visits.  He has scheduled appointment to see Dr. Pryor at [facility], believes his appointment is in a few weeks.    8/4/2022: Clinic visit with MAGNUS Florian.  Patient reports increased purulent drainage from bilateral lower extremity wound.  Patient reports that the dressings had a yellowish tinge to them.  Patient reports he has had increased pain to the left wound site.  Patient has increased maceration.    8/11/2022 : Clinic visit with MAGNUS Hdz, DUYEN, OMAR, JAZMYN.  Patient states that he is feeling well overall.  He has not yet completed his EKG.  States that he tried to get this done in [place], was told there was no order.  He was provided with a printed requisition today.  As instructed, he is not yet picked up his Cipro.  He presents today with just a minimal amount of yellowish drainage, no odor.  Culture collected today after debridement of wound.  He states he has an appointment with Dr. Pryor at [facility] coming up, but is not certain of the date.  He continues to wear old orthotic shoes with inserts.  He is unable to offload to any significant degree.   He also informed me that [facility] is  requesting a new referral order.  Order placed through [facility].    9/1/2022: Clinic visit with Joni Jewell MD. Patient reports doing ok. He denies any fevers or chills. Patient endorses some increased pain in feet from RA. Unfortunately, patient was not accepted by home health, he is too independent. Nursing ordering supplies today in clinic. Patient presents without compression today with large amount of lower extremity edema discussed need for compression. He reports heavy serous drainage from wounds, has been changing dressings daily. I recommended changing to Hydrofiber for increased drainage management, however patient strongly prefers hydrofera blue and silicone adhesive foam due to sensitivities to Hydrofiber in past and tape. Has appointment with [facility] on 9/13. Has offloading shoe right foot and reports that [facility] working with him to offload left foot, reports will  offloading boot today.    9/8/2022: Clinic visit with Joni Jewell MD. Patient reports doing ok. He reports new diffuse rash which is worse on feet and skin folds, reports had similar fungal rashes after previous courses of Abx and reports started after he competed Abx. His PCP does not have availability to see him. Discussed that due to diffuse nature of fungal infection will prescribe short course diflucan, has tolerated in past. Did not wear compression today, reports that he has been wearing all week. He has appointment with [facility] today for left foot offloading boot. Patient has appointment 9/13 with Dr. Pryor. Wounds again macerated, will only accept hydrofera blue.    REVIEW OF SYSTEMS:   Review of Systems   Constitutional:  Negative for chills, fever and malaise/fatigue.   Cardiovascular:  Positive for leg swelling.   Musculoskeletal:  Positive for joint pain and myalgias.   Skin:         Diffuse rash worse in skin folds and feet  Open wounds b/l plantar feet         PHYSICAL EXAMINATION:   /62 (BP Location: Left  arm, Patient Position: Sitting)   Pulse 67   Temp 36.5 °C (97.7 °F) (Temporal)   Resp 20   SpO2 97%     Physical Exam  Constitutional:       Appearance: He is obese.   Cardiovascular:      Comments: Palpable pedal pulses, 2+ bilaterally  Pulmonary:      Effort: Pulmonary effort is normal. No respiratory distress.      Breath sounds: No wheezing.   Musculoskeletal:      Right lower leg: Edema present.      Left lower leg: Edema present.      Comments: Charcot deformities of both feet  RA changes to hands and feet  Pitting edema/lymphedema to bilateral lower extremities   Skin:     Findings: Erythema present.      Comments: Full-thickness ulcers to bilateral plantar/medial, over bony prominences - Area of both wounds are unchanged since last assessment. Reports heavy serous drainage with periwound macerated calluses, does not appear infected.  Diffuse maculopapular skin rash with confluence in skin folds consistent with fungal dermatitis.    Refer to wound flowsheet and photos   Neurological:      General: No focal deficit present.      Mental Status: He is alert and oriented to person, place, and time.       WOUND ASSESSMENT  Wound 03/25/22 Full Thickness Wound Foot Left --Left Plantar Mid Foot (Active)   Wound Image    09/08/22 0900   Site Assessment Pink;Yellow 09/08/22 0900   Periwound Assessment Callused;Blanchable erythema 09/08/22 0900   Margins Unattached edges 09/08/22 0900   Drainage Amount Large 09/08/22 0900   Drainage Description Serosanguineous 09/08/22 0900   Treatments Cleansed;Topical Lidocaine;Provider debridement 09/08/22 0900   Wound Cleansing Normal Saline Irrigation 09/08/22 0900   Periwound Protectant Barrier Paste;Skin Protectant Wipes to Periwound 09/08/22 0900   Dressing Cleansing/Solutions Not Applicable 09/08/22 0900   Dressing Options Hydrofera Blue Ready;Silicone Adhesive Foam;Tubigrip 09/08/22 0900   Dressing Changed New 09/08/22 0900   Dressing Change/Treatment Frequency Every 72  hrs, and As Needed 09/08/22 0900   Non-staged Wound Description Full thickness 09/08/22 0900   Wound Length (cm) 3.1 cm 09/08/22 0900   Wound Width (cm) 1.8 cm 09/08/22 0900   Wound Depth (cm) 0.3 cm 09/08/22 0900   Wound Surface Area (cm^2) 5.58 cm^2 09/08/22 0900   Wound Volume (cm^3) 1.674 cm^3 09/08/22 0900   Post-Procedure Length (cm) 3 cm 09/08/22 0900   Post-Procedure Width (cm) 2 cm 09/08/22 0900   Post-Procedure Depth (cm) 0.3 cm 09/08/22 0900   Post-Procedure Surface Area (cm^2) 6 cm^2 09/08/22 0900   Post-Procedure Volume (cm^3) 1.8 cm^3 09/08/22 0900   Wound Healing % -520 09/08/22 0900   Tunneling (cm) 0 cm 09/08/22 0900   Undermining (cm) 0 cm 09/08/22 0900   Wound Odor None 09/08/22 0900   Pulses Left;DP;PT;Doppler 07/07/22 1040   Exposed Structures None 09/08/22 0900   Number of days: 167       Wound 03/25/22 Full Thickness Wound Foot Right --Right Plantar Mid Foot (Active)   Wound Image    09/08/22 0900   Site Assessment Red;Yellow 09/08/22 0900   Periwound Assessment Callused;Blanchable erythema 09/08/22 0900   Margins Unattached edges 09/08/22 0900   Drainage Amount Moderate 09/08/22 0900   Drainage Description Serosanguineous 09/08/22 0900   Treatments Cleansed;Topical Lidocaine;Provider debridement 09/08/22 0900   Wound Cleansing Normal Saline Irrigation 09/08/22 0900   Periwound Protectant Barrier Paste;Skin Protectant Wipes to Periwound 09/08/22 0900   Dressing Cleansing/Solutions Not Applicable 09/08/22 0900   Dressing Options Hydrofera Blue Ready;Silicone Adhesive Foam;Tubigrip 09/08/22 0900   Dressing Changed New 09/08/22 0900   Dressing Change/Treatment Frequency Every 72 hrs, and As Needed 09/08/22 0900   Non-staged Wound Description Full thickness 09/08/22 0900   Wound Length (cm) 1.3 cm 09/08/22 0900   Wound Width (cm) 1.5 cm 09/08/22 0900   Wound Depth (cm) 0.3 cm 09/08/22 0900   Wound Surface Area (cm^2) 1.95 cm^2 09/08/22 0900   Wound Volume (cm^3) 0.585 cm^3 09/08/22 0900    Post-Procedure Length (cm) 1.4 cm 09/08/22 0900   Post-Procedure Width (cm) 1.6 cm 09/08/22 0900   Post-Procedure Depth (cm) 0.3 cm 09/08/22 0900   Post-Procedure Surface Area (cm^2) 2.24 cm^2 09/08/22 0900   Post-Procedure Volume (cm^3) 0.672 cm^3 09/08/22 0900   Wound Healing % -54 09/08/22 0900   Tunneling (cm) 0 cm 09/08/22 0900   Undermining (cm) 0 cm 09/08/22 0900   Undermining of Wound, 1st Location From 4 o'clock;To 8 o'clock 07/15/22 1000   Wound Odor None 09/08/22 0900   Pulses Right;DP;PT;Doppler 07/07/22 1040   Exposed Structures None 09/08/22 0900   Number of days: 167         PROCEDURE:   Excisional debridement of right and left plantar/medial foot ulcers  -2% viscous lidocaine applied topically to wound beds for approximately 5 minutes prior to debridement  -Curette used to debride wound beds and open periwound.  Excisional debridement was performed to remove devitalized tissue until healthy, bleeding tissue was visualized.   Entire surface of wounds, 7.82 cm2 debrided.  Tissue debrided into the subcutaneous layer.    -Bleeding controlled with manual pressure.   -Wound care completed by wound RN, refer to flowsheet  -Patient tolerated the procedure well, without c/o pain or discomfort.     Pertinent Labs and Diagnostics:    Labs: N/A    IMAGING: Foot/Toes Imaging, Past Year DX-TOE(S) 2+ LEFT    Result Date: 8/18/2021  Narrative 8/18/2021 1:57 PM HISTORY/REASON FOR EXAM:  Pain/Deformity Following Trauma. TECHNIQUE/EXAM DESCRIPTION AND NUMBER OF VIEWS:  3 views of the LEFT toes. COMPARISON: January 1 FINDINGS: There is osteopenia in this decreases sensitivity of the study for the detection of fracture New first proximal phalanx base lucency seen medially extends to the metatarsal-phalangeal articular margin Third and fourth metatarsal neck fractures have healed with slight valgus angulation. Probable chronic fifth metatarsal head bunionectomy laterally, unchanged No subluxation     Impression  Acute-subacute first proximal phalanx intra-articular base fracture is nondisplaced Healed third and fourth metatarsal neck fractures Severe osteopenia      VASCULAR STUDIES: No results found.     LAST  WOUND CULTURE:    8/11/2022 See results      ASSESSMENT AND PLAN:     1. Chronic foot ulcer with fat layer exposed, left (HCC); Chronic foot ulcer, right, with fat layer exposed (HCC)  Comments: Right plantar foot wound present for over a year, left plantar foot ulcer reopened in early March 2022 9/8/2022: Area of both wounds with small improvement. Moderate to high serous drainage reported by patient, continues to have macerated periwound callus.  -Excisional debridement was performed in clinic, medically necessary to promote wound healing.  -Patient to return to clinic weekly for assessment debridement if necessary.  -Patient reports that he did not receive shipment of supplies, nursing to inquire  -Reviewed importance of offloading these wounds in order to promote healing. He is inadequately offloading. Has offloading shoe for right foot, wearing normal shoe on left. He reports working with [facility] to obtain offloading boots, reports he has appointment with [facility] today  -Wound culture on 8/11 positive for Moderate growth Strep group B and MRSA, completed course of Zyvox. Light growth pseudomonas, however no evidence of pseudomonal infection today with his improved management of drainage.  - Has appointment with Dr. Pryor 9/13.  - Tubigrip for edema management, does not tolerate further compression and did not tolerate double layer tubigrip.     Wound care: Hydrofera Blue ready, silicone adhesive foam, Hypafix tape    2. Rheumatoid nodule, left ankle and foot (HCC); Rheumatoid nodule, right ankle and foot (HCC)\  Comments: Per patient, ulcers caused by underlying nodules.  Podiatrist does not want to operate, has referred patient back to [facility]    9/8/2022: Patient states that he has an upcoming appoint with  Dr. Pryor at [facility], 9/13  -May require surgical excision of nodules      3.  History of immunosuppressive therapy  Comments: Patient has a long history of immune suppressive therapy due to RA.  This is complicating wound healing.    9/8/2022: Patient is currently on Humira weekly, prednisone as needed for flareups.    4. Fungal Dermatitis    9/8/2022  - Worsening diffuse rash, appears fungal in nature  - Due to diffuse nature and on immunosuppression, recommend oral diflucan. He reports to have tolerated in past. Renal and liver function normal.  - Will prescribe fluconazole 150mg Q72 hours for 3 doses    PATIENT EDUCATION:  - Implications of loss of protective sensation (LOPS) discussed with patient- including increased risk for amputation.  - Advised to check feet at least daily, moisturize feet, and to always wear protective foot wear.   -  Importance of offloading foot to assist with wound healing  - Advised pt not to trim nails or calluses, seek foot/nail care from podiatrist or certified foot/nail nurse  - Importance of adequate nutrition for wound healing    Please note that this note may have been created using voice recognition software. I have worked with technical experts from [employer] to optimize the interface.  I have made every reasonable attempt to correct obvious errors, but there may be errors of grammar and possibly content that I did not discover before finalizing the note.

## 2022-09-08 NOTE — PATIENT INSTRUCTIONS
-Keep your wound dressing clean, dry, and intact.    -Change your dressing if it becomes soiled, soaked, or falls off.    -Remove your compression wrap if you have severe pain, severe swelling, numbness, color change, or temperature change in your toes. If you need to remove your compression wrap, do so by unrolling it. Do not cut the compression wrap off to prevent cutting yourself on accident.    -Should you experience any significant changes in your wound(s), such as infection (redness, swelling, localized heat, increased pain, fever > 101 F, chills) or have any questions regarding your home care instructions, please contact the wound center at [phone]. If after hours, contact your primary care physician or go to the hospital emergency room.

## 2022-09-12 PROCEDURE — RXMED WILLOW AMBULATORY MEDICATION CHARGE: Performed by: INTERNAL MEDICINE

## 2022-09-13 ENCOUNTER — PHARMACY VISIT (OUTPATIENT)
Dept: PHARMACY | Facility: MEDICAL CENTER | Age: 47
End: 2022-09-13
Payer: COMMERCIAL

## 2022-09-19 ENCOUNTER — TELEPHONE (OUTPATIENT)
Dept: WOUND CARE | Facility: MEDICAL CENTER | Age: 47
End: 2022-09-19
Payer: COMMERCIAL

## 2022-09-19 ENCOUNTER — DOCUMENTATION (OUTPATIENT)
Dept: PHARMACY | Facility: MEDICAL CENTER | Age: 47
End: 2022-09-19
Payer: COMMERCIAL

## 2022-09-19 NOTE — PROGRESS NOTES
(OT5) PHARMACIST PRE SCREEN - **TRF Onboarding**     List Drug Allergies: Benadryl, NSAID, Phenergan, PCN  List Non-Drug Allergies:  NKA  List ICD-10: M05.79  List Diagnosis: RA  List Goals of Therapy:      Decrease disease activity (RAPID-3)    Prevent and reduce flares and inflammation   Alleviate pain    Decrease symptoms associated with RA    Maintain function of Activities of Daily Living (ADLs)     Drug Therapy (name/formulation/dose/route of admin/freq): Humira 40mg/ml 1 pen every 14 days     Appropriateness Review (Y/N + If being prescribed off label, notate supporting reference):    ? Dose appropriateness (Y/N + BSA, height/weight if applicable): Yes    ? Any Renal/Hepatic adjustments needed (Y/N + explain)? None     ? Comorbidity and Past Medical History reviewed in EMR. Any comorbidities, PMH, precautions, or contraindications that pose medication safety concern (Y/N + document and reach out to provider if appropriate)? None     ? Any relevant lab work needed that affect the initial start date (Y/N + document and reach out to provider if appropriate)? None     EMR med list reviewed. List DDI’s: Y, none relevant     Patient’s ability to self-administer medication: No issues identified per EMR     Patient has been on Humira since 11/2019    Patient has pre-emptively opted out of clinical [facility] services   - Nancy beyer

## 2022-09-19 NOTE — TELEPHONE ENCOUNTER
hasn't received supplies after [employer] order placed. This writer gave him [employer] number to call as this writer had received communication that the order was received and deductible may not have been met.  wasn't aware of what [employer] was, explained it is the company that would fill supply order since he doesn't have home health. With next visit discuss order for home health.

## 2022-09-22 ENCOUNTER — TELEPHONE (OUTPATIENT)
Dept: WOUND CARE | Facility: MEDICAL CENTER | Age: 47
End: 2022-09-22
Payer: COMMERCIAL

## 2022-09-22 ENCOUNTER — OFFICE VISIT (OUTPATIENT)
Dept: WOUND CARE | Facility: MEDICAL CENTER | Age: 47
End: 2022-09-22
Attending: NURSE PRACTITIONER
Payer: COMMERCIAL

## 2022-09-22 VITALS
HEART RATE: 70 BPM | OXYGEN SATURATION: 95 % | DIASTOLIC BLOOD PRESSURE: 67 MMHG | SYSTOLIC BLOOD PRESSURE: 95 MMHG | TEMPERATURE: 98.3 F | RESPIRATION RATE: 20 BRPM

## 2022-09-22 DIAGNOSIS — L97.512 CHRONIC FOOT ULCER, RIGHT, WITH FAT LAYER EXPOSED (HCC): ICD-10-CM

## 2022-09-22 DIAGNOSIS — M06.372 RHEUMATOID NODULE, LEFT ANKLE AND FOOT (HCC): ICD-10-CM

## 2022-09-22 DIAGNOSIS — B36.9 FUNGAL DERMATITIS: ICD-10-CM

## 2022-09-22 DIAGNOSIS — Z92.25 HISTORY OF IMMUNOSUPPRESSIVE THERAPY: ICD-10-CM

## 2022-09-22 DIAGNOSIS — L97.522 CHRONIC FOOT ULCER WITH FAT LAYER EXPOSED, LEFT (HCC): ICD-10-CM

## 2022-09-22 DIAGNOSIS — M06.371 RHEUMATOID NODULE, RIGHT ANKLE AND FOOT (HCC): ICD-10-CM

## 2022-09-22 PROCEDURE — 11042 DBRDMT SUBQ TIS 1ST 20SQCM/<: CPT

## 2022-09-22 PROCEDURE — 11042 DBRDMT SUBQ TIS 1ST 20SQCM/<: CPT | Performed by: NURSE PRACTITIONER

## 2022-09-22 PROCEDURE — 99213 OFFICE O/P EST LOW 20 MIN: CPT

## 2022-09-22 PROCEDURE — 99213 OFFICE O/P EST LOW 20 MIN: CPT | Mod: 25 | Performed by: NURSE PRACTITIONER

## 2022-09-22 PROCEDURE — 11045 DBRDMT SUBQ TISS EACH ADDL: CPT

## 2022-09-22 NOTE — TELEPHONE ENCOUNTER
hasn't received his [employer] order, he was advised that they hadn't received order. This writer called [employer] and spoke with Olga who confirmed what I was told by the rep last week that his deductible hasn't been met. Resent the order to [employer]. Advised  contact his insurance regarding deductible.

## 2022-09-22 NOTE — PROGRESS NOTES
Provider Encounter-full thickness wound      HISTORY OF PRESENT ILLNESS  Wound History:   START OF CARE IN CLINIC: 07/07/22   REFERRING PROVIDER: Brit Fitch   WOUND-full-thickness ulcers   LOCATION: Right plantar/medial midfoot                                   left plantar/medial midfoot   HISTORY: Patient with history of RA referred to [facility] for evaluation treatment of bilateral plantar foot ulcers.  States that the right plantar foot ulcer started in early 2021, is a small wound which gradually became bigger.  He has another ulcer to his left foot which healed at one time, but reopened in early March of this year.  He has been seeing a podiatrist, who was told him that the ulcers are due to RA nodules.  He was at one point referred to Dr. Pryor at [facility], who told him that his podiatrist would need to remove the nodules.  Podiatry states that that is not something she would do, has referred him back to [facility].      Pertinent Medical History: Rheumatoid arthritis, history of osteomyelitis, immunosuppressive therapy, anxiety disorder, MONICA, aortic root dilation, hypertension, paroxysmal atrial fibrillation, aortic stenosis      TOBACCO USE:   Current everyday smoker 0.25 packs/day smokes cigarettes and cigars    Patient's problem list, allergies, and current medications reviewed and updated in Epic    Interval History:    7/8/2022: Clinic visit with JONATHON Florian   states overall he is feeling well denies fever or chills.  Patient was seen at [facility] in March for the same wounds.  Back in March patient was to reschedule with Dr. Pryor from the [facility].  I do not see any notes in epic we will request last clinical note from Dr. Pryor, I believe the patient did not reschedule for follow-up.  He states he was previously told that his insurance would not cover offloading shoes because he is not a diabetic.  Patient is on his feet often due to 2 young sons that he cares for he reports his wife  is an APRN and he takes care of the kids.  He was previously given a prescription to [facility] for offloading shoes and orthotics in March however, he did not fill the prescription.  Patient reports that he has compression stockings but is not currently wearing them.    7/15/2022 : Clinic visit with MAGNUS Hdz, DUYEN, OMAR, JAZMYN.   states that overall he feels okay.  He is accompanied today by his 4-year-old son.  He was unable to tolerate Hydrofiber silver dressing applied last visit, states that it caused burning sensation and was too wet.  He also presents today wearing regular tennis shoes.  He has not yet obtained off loading shoe/boot from orthotist.  He states he is planning to go to [facility] later today.  [facility] continues to see him in between clinic visits.  He has scheduled appointment to see Dr. Pryor at [facility], believes his appointment is in a few weeks.    8/4/2022: Clinic visit with MAGNUS Florian.  Patient reports increased purulent drainage from bilateral lower extremity wound.  Patient reports that the dressings had a yellowish tinge to them.  Patient reports he has had increased pain to the left wound site.  Patient has increased maceration.    8/11/2022 : Clinic visit with MAGNUS Hdz, DUYEN, OMAR, JAZMYN.  Patient states that he is feeling well overall.  He has not yet completed his EKG.  States that he tried to get this done in [place], was told there was no order.  He was provided with a printed requisition today.  As instructed, he is not yet picked up his Cipro.  He presents today with just a minimal amount of yellowish drainage, no odor.  Culture collected today after debridement of wound.  He states he has an appointment with Dr. Pryor at [facility] coming up, but is not certain of the date.  He continues to wear old orthotic shoes with inserts.  He is unable to offload to any significant degree.   He also informed me that [facility] is  requesting a new referral order.  Order placed through [facility].    9/1/2022: Clinic visit with Joni Jewell MD. Patient reports doing ok. He denies any fevers or chills. Patient endorses some increased pain in feet from RA. Unfortunately, patient was not accepted by home health, he is too independent. Nursing ordering supplies today in clinic. Patient presents without compression today with large amount of lower extremity edema discussed need for compression. He reports heavy serous drainage from wounds, has been changing dressings daily. I recommended changing to Hydrofiber for increased drainage management, however patient strongly prefers hydrofera blue and silicone adhesive foam due to sensitivities to Hydrofiber in past and tape. Has appointment with [facility] on 9/13. Has offloading shoe right foot and reports that [facility] working with him to offload left foot, reports will  offloading boot today.    9/8/2022: Clinic visit with Joni Jewell MD. Patient reports doing ok. He reports new diffuse rash which is worse on feet and skin folds, reports had similar fungal rashes after previous courses of Abx and reports started after he competed Abx. His PCP does not have availability to see him. Discussed that due to diffuse nature of fungal infection will prescribe short course diflucan, has tolerated in past. Did not wear compression today, reports that he has been wearing all week. He has appointment with [facility] today for left foot offloading boot. Patient has appointment 9/13 with Dr. Pryor. Wounds again macerated, will only accept hydrofera blue.    9/22/2022 : Clinic visit with MAGNUS Hdz, FNP-BC, CWOCN, CFCN.   Patient states that he is feeling overwhelmed.  He has not yet scheduled his MRI as ordered by Dr. Pryor.  He states that he is thinking about getting another opinion, but does not have a physician in mind.  He is very unhappy with recommended surgery, stating that he is not able to  "stay off of his feet postoperatively for up to a year, as recommended.  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  He understands that his options are very limited, and states that he needs to, \"think about it\".   He verbalizes understanding that these wounds are not likely to heal without surgical intervention, but that he just does not know how his family will manage during his recovery.   He did obtain an offloading boot for his left foot as ordered.  However, he is not wearing it because he states it caused too much pain.   He can requested to see clinic supervisor, Claudia, today regarding his supply order.  He stated that he had not yet received his supplies, and was told by [facility] staff that they did not have an order.  Claudia confirmed with [facility] that they did indeed have an order, however that his deductible had not yet been met.  This was relayed to patient, and he was advised to contact [facility] regarding his deductible.    REVIEW OF SYSTEMS:   Unchanged from previous assessment on 9/8/2022      PHYSICAL EXAMINATION:   BP (!) 95/67 (BP Location: Left arm, Patient Position: Sitting) Comment: RN notified  Pulse 70   Temp 36.8 °C (98.3 °F) (Temporal)   Resp 20   SpO2 95%     Physical Exam  Constitutional:       Appearance: He is obese.   Cardiovascular:      Comments: Palpable pedal pulses, 2+ bilaterally  Pulmonary:      Effort: Pulmonary effort is normal. No respiratory distress.      Breath sounds: No wheezing.   Musculoskeletal:      Right lower leg: Edema present.      Left lower leg: Edema present.      Comments: Charcot deformities of both feet  RA changes to hands and feet  Pitting edema/lymphedema to bilateral lower extremities   Skin:     Findings: Erythema present.      Comments: Full-thickness ulcers to bilateral plantar/medial, over bony prominences -no significant change in area to either wound.  Wound beds are red without granulation tissue edges rolled, " thin layer of slough to wound beds, moderate amount of serosanguineous drainage, no wound odor.  Mild peristomal dermatitis to wound edges      Refer to wound flowsheet and photos   Neurological:      General: No focal deficit present.      Mental Status: He is alert and oriented to person, place, and time.       WOUND ASSESSMENT  Wound 03/25/22 Full Thickness Wound Foot Left --Left Plantar Mid Foot (Active)   Wound Image    09/22/22 1000   Site Assessment Pink;Yellow 09/22/22 1000   Periwound Assessment Callused;Blanchable erythema;Rash 09/22/22 1000   Margins Unattached edges 09/22/22 1000   Drainage Amount Moderate 09/22/22 1000   Drainage Description Serosanguineous 09/22/22 1000   Treatments Cleansed;Topical Lidocaine 09/22/22 1000   Wound Cleansing Normal Saline Irrigation 09/22/22 1000   Periwound Protectant Barrier Paste 09/22/22 1000   Dressing Cleansing/Solutions Not Applicable 09/22/22 1000   Dressing Options Hydrofera Blue Ready;Silicone Adhesive Foam;Tubigrip 09/22/22 1000   Dressing Changed Changed 09/22/22 1000   Dressing Change/Treatment Frequency Every 72 hrs, and As Needed 09/22/22 1000   Non-staged Wound Description Full thickness 09/22/22 1000   Wound Length (cm) 3.1 cm 09/22/22 1000   Wound Width (cm) 1.8 cm 09/22/22 1000   Wound Depth (cm) 0.2 cm 09/22/22 1000   Wound Surface Area (cm^2) 5.58 cm^2 09/22/22 1000   Wound Volume (cm^3) 1.116 cm^3 09/22/22 1000   Post-Procedure Length (cm) 3.2 cm 09/22/22 1000   Post-Procedure Width (cm) 1.9 cm 09/22/22 1000   Post-Procedure Depth (cm) 0.3 cm 09/22/22 1000   Post-Procedure Surface Area (cm^2) 6.08 cm^2 09/22/22 1000   Post-Procedure Volume (cm^3) 1.824 cm^3 09/22/22 1000   Wound Healing % -313 09/22/22 1000   Tunneling (cm) 0 cm 09/22/22 1000   Undermining (cm) 0 cm 09/22/22 1000   Wound Odor None 09/22/22 1000   Pulses Left;DP;PT;Doppler 07/07/22 1040   Exposed Structures None 09/22/22 1000   Number of days: 181       Wound 03/25/22 Full Thickness  Wound Foot Right --Right Plantar Mid Foot (Active)   Wound Image    09/22/22 1000   Site Assessment Red;Yellow 09/22/22 1000   Periwound Assessment Callused;Blanchable erythema 09/22/22 1000   Margins Unattached edges 09/22/22 1000   Drainage Amount Moderate 09/22/22 1000   Drainage Description Serosanguineous 09/22/22 1000   Treatments Cleansed;Topical Lidocaine 09/22/22 1000   Wound Cleansing Normal Saline Irrigation 09/22/22 1000   Periwound Protectant Barrier Paste 09/22/22 1000   Dressing Cleansing/Solutions Not Applicable 09/22/22 1000   Dressing Options Hydrofera Blue Ready;Silicone Adhesive Foam;Tubigrip 09/22/22 1000   Dressing Changed New 09/08/22 0900   Dressing Change/Treatment Frequency Every 72 hrs, and As Needed 09/22/22 1000   Non-staged Wound Description Full thickness 09/22/22 1000   Wound Length (cm) 1.5 cm 09/22/22 1000   Wound Width (cm) 1.6 cm 09/22/22 1000   Wound Depth (cm) 0.3 cm 09/22/22 1000   Wound Surface Area (cm^2) 2.4 cm^2 09/22/22 1000   Wound Volume (cm^3) 0.72 cm^3 09/22/22 1000   Post-Procedure Length (cm) 1.5 cm 09/22/22 1000   Post-Procedure Width (cm) 1.7 cm 09/22/22 1000   Post-Procedure Depth (cm) 0.3 cm 09/22/22 1000   Post-Procedure Surface Area (cm^2) 2.55 cm^2 09/22/22 1000   Post-Procedure Volume (cm^3) 0.765 cm^3 09/22/22 1000   Wound Healing % -89 09/22/22 1000   Tunneling (cm) 0 cm 09/22/22 1000   Undermining (cm) 0 cm 09/22/22 1000   Undermining of Wound, 1st Location From 4 o'clock;To 8 o'clock 07/15/22 1000   Wound Odor None 09/22/22 1000   Pulses Right;DP;PT;Doppler 07/07/22 1040   Exposed Structures None 09/22/22 1000   Number of days: 181         PROCEDURE:   Excisional debridement of right and left plantar/medial foot ulcers  -2% viscous lidocaine applied topically to wound beds for approximately 5 minutes prior to debridement  -Curette used to debride wound beds and open periwound.  Excisional debridement was performed to remove devitalized tissue until  healthy, bleeding tissue was visualized.   Entire surface of wounds, 8.63 cm² debrided.  Tissue debrided into the subcutaneous layer.    -Bleeding controlled with manual pressure.   -Wound care completed by wound RN, refer to flowsheet  -Patient tolerated the procedure well, without c/o pain or discomfort.     Pertinent Labs and Diagnostics:    Labs: N/A    IMAGING: Foot/Toes Imaging, Past Year DX-TOE(S) 2+ LEFT    Result Date: 8/18/2021  Narrative 8/18/2021 1:57 PM HISTORY/REASON FOR EXAM:  Pain/Deformity Following Trauma. TECHNIQUE/EXAM DESCRIPTION AND NUMBER OF VIEWS:  3 views of the LEFT toes. COMPARISON: January 1 FINDINGS: There is osteopenia in this decreases sensitivity of the study for the detection of fracture New first proximal phalanx base lucency seen medially extends to the metatarsal-phalangeal articular margin Third and fourth metatarsal neck fractures have healed with slight valgus angulation. Probable chronic fifth metatarsal head bunionectomy laterally, unchanged No subluxation     Impression Acute-subacute first proximal phalanx intra-articular base fracture is nondisplaced Healed third and fourth metatarsal neck fractures Severe osteopenia      VASCULAR STUDIES: No results found.     LAST  WOUND CULTURE:    8/11/2022 See results      ASSESSMENT AND PLAN:     1. Chronic foot ulcer with fat layer exposed, left (HCC); Chronic foot ulcer, right, with fat layer exposed (HCC)  Comments: Right plantar foot wound present for over a year, left plantar foot ulcer reopened in early March 2022 9/22/2022: Total wound area has increased slightly since last assessment.  Healing complicated by underlying bony prominences, which patient is unable to offload from.  Surgery has been recommended, however patient does not feel he can proceed because of family responsibilities.  He has not been able to comply with offloading as recommended.  -Excisional debridement was performed in clinic, medically necessary to  promote wound healing.  -Patient to return to clinic weekly for assessment debridement if necessary.  -Wound care supplies have been ordered, however apparently patient is not met his deductible.  He was advised to contact [facility] for further information.  -Reiterated the importance of offloading in order for these wounds to heal.  Patient was prescribed an offloading boot, which he cannot wear.  He verbalizes understanding of that these wounds are not likely to heal as long as he continues to walk on them.  -Advised patient to get MRI as ordered by Dr. Pryor.  However, he is hesitant to proceed as he does not believe he can undergo surgery.     Wound care: Hydrofera Blue ready, silicone adhesive foam, Hypafix tape    2. Rheumatoid nodule, left ankle and foot (HCC); Rheumatoid nodule, right ankle and foot (HCC)\  Comments: Per patient, ulcers caused by underlying nodules.  Podiatrist does not want to operate, has referred patient back to [facility]    9/22/2022: See above      3.  History of immunosuppressive therapy  Comments: Patient has a long history of immune suppressive therapy due to RA.  This is complicating wound healing.    9/22/2022: Patient is on Humira weekly, and prednisone as needed    4. Fungal Dermatitis    9/22/2022: Resolved      PATIENT EDUCATION:  - Implications of loss of protective sensation (LOPS) discussed with patient- including increased risk for amputation.  - Advised to check feet at least daily, moisturize feet, and to always wear protective foot wear.   -  Importance of offloading foot to assist with wound healing  - Advised pt not to trim nails or calluses, seek foot/nail care from podiatrist or certified foot/nail nurse  - Importance of adequate nutrition for wound healing      My total time spent caring for the patient on the day of the encounter was 20 minutes.   This does not include time spent on separately billable procedures/tests.     Please note that this note may have been created  using voice recognition software. I have worked with technical experts from [facility] to optimize the interface.  I have made every reasonable attempt to correct obvious errors, but there may be errors of grammar and possibly content that I did not discover before finalizing the note.

## 2022-09-22 NOTE — PATIENT INSTRUCTIONS
Reviewed POC, importance of keeping the secondary dressing dry and intact, offloading, compression therapy, s/s of complications/infection, when to notify MD/go to ER.  Pt verbalized understanding to all.

## 2022-10-06 ENCOUNTER — APPOINTMENT (OUTPATIENT)
Dept: WOUND CARE | Facility: MEDICAL CENTER | Age: 47
End: 2022-10-06
Attending: NURSE PRACTITIONER
Payer: COMMERCIAL

## 2022-10-10 PROCEDURE — RXMED WILLOW AMBULATORY MEDICATION CHARGE: Performed by: INTERNAL MEDICINE

## 2022-10-10 RX ORDER — ADALIMUMAB 40MG/0.4ML
KIT SUBCUTANEOUS
Qty: 2 EACH | Refills: 0 | Status: CANCELLED | OUTPATIENT
Start: 2022-10-10

## 2022-10-11 ENCOUNTER — PHARMACY VISIT (OUTPATIENT)
Dept: PHARMACY | Facility: MEDICAL CENTER | Age: 47
End: 2022-10-11
Payer: COMMERCIAL

## 2022-10-31 PROCEDURE — RXMED WILLOW AMBULATORY MEDICATION CHARGE: Performed by: INTERNAL MEDICINE

## 2022-11-02 ENCOUNTER — PHARMACY VISIT (OUTPATIENT)
Dept: PHARMACY | Facility: MEDICAL CENTER | Age: 47
End: 2022-11-02
Payer: COMMERCIAL

## 2022-11-07 NOTE — PROGRESS NOTES
Pharmacy is requesting medication refill.  Please approve or deny this request.    Rx requested:  Requested Prescriptions     Pending Prescriptions Disp Refills    OXcarbazepine (TRILEPTAL) 300 MG tablet [Pharmacy Med Name: OXCARBAZEPINE 300 MG TABLET] 270 tablet 3     Sig: TAKE 1 TABLET BY MOUTH THREE TIMES A DAY         Last Office Visit:   11/22/2021      Next Visit Date:  Future Appointments   Date Time Provider Linda Petty   11/21/2022  1:00 PM MD [facility] - Wound culture obtained from left foot wound per order. Provided EKG order requisition to pt. [facility] wound care orders faxed to [facility].

## 2022-12-02 PROCEDURE — RXMED WILLOW AMBULATORY MEDICATION CHARGE: Performed by: INTERNAL MEDICINE

## 2022-12-05 ENCOUNTER — PHARMACY VISIT (OUTPATIENT)
Dept: PHARMACY | Facility: MEDICAL CENTER | Age: 47
End: 2022-12-05
Payer: COMMERCIAL

## 2023-01-01 ENCOUNTER — APPOINTMENT (OUTPATIENT)
Dept: PHYSICAL THERAPY | Facility: REHABILITATION | Age: 48
DRG: 560 | End: 2023-01-01
Attending: PHYSICAL MEDICINE & REHABILITATION
Payer: COMMERCIAL

## 2023-01-01 ENCOUNTER — APPOINTMENT (OUTPATIENT)
Dept: OCCUPATIONAL THERAPY | Facility: REHABILITATION | Age: 48
DRG: 560 | End: 2023-01-01
Attending: PHYSICAL MEDICINE & REHABILITATION
Payer: COMMERCIAL

## 2023-01-01 ENCOUNTER — APPOINTMENT (OUTPATIENT)
Dept: INPATIENT REHAB | Facility: REHABILITATION | Age: 48
DRG: 559 | End: 2023-01-01
Attending: PHYSICAL MEDICINE & REHABILITATION
Payer: COMMERCIAL

## 2023-01-01 ENCOUNTER — PHARMACY VISIT (OUTPATIENT)
Dept: PHARMACY | Facility: MEDICAL CENTER | Age: 48
End: 2023-01-01
Payer: COMMERCIAL

## 2023-01-01 ENCOUNTER — APPOINTMENT (OUTPATIENT)
Dept: OCCUPATIONAL THERAPY | Facility: REHABILITATION | Age: 48
DRG: 291 | End: 2023-01-01
Attending: PHYSICAL MEDICINE & REHABILITATION
Payer: COMMERCIAL

## 2023-01-01 ENCOUNTER — APPOINTMENT (OUTPATIENT)
Dept: RADIOLOGY | Facility: MEDICAL CENTER | Age: 48
DRG: 871 | End: 2023-01-01
Attending: INTERNAL MEDICINE
Payer: COMMERCIAL

## 2023-01-01 ENCOUNTER — APPOINTMENT (OUTPATIENT)
Dept: PHYSICAL THERAPY | Facility: REHABILITATION | Age: 48
DRG: 291 | End: 2023-01-01
Attending: PHYSICAL MEDICINE & REHABILITATION
Payer: COMMERCIAL

## 2023-01-01 ENCOUNTER — TELEPHONE (OUTPATIENT)
Dept: MEDICAL GROUP | Facility: MEDICAL CENTER | Age: 48
End: 2023-01-01
Payer: COMMERCIAL

## 2023-01-01 ENCOUNTER — TELEPHONE (OUTPATIENT)
Dept: CARDIOLOGY | Facility: MEDICAL CENTER | Age: 48
End: 2023-01-01
Payer: COMMERCIAL

## 2023-01-01 ENCOUNTER — APPOINTMENT (OUTPATIENT)
Dept: RADIOLOGY | Facility: MEDICAL CENTER | Age: 48
DRG: 291 | End: 2023-01-01
Attending: NURSE PRACTITIONER
Payer: COMMERCIAL

## 2023-01-01 ENCOUNTER — APPOINTMENT (OUTPATIENT)
Dept: RADIOLOGY | Facility: MEDICAL CENTER | Age: 48
DRG: 463 | End: 2023-01-01
Attending: EMERGENCY MEDICINE
Payer: COMMERCIAL

## 2023-01-01 ENCOUNTER — HOSPITAL ENCOUNTER (INPATIENT)
Facility: MEDICAL CENTER | Age: 48
LOS: 9 days | DRG: 463 | End: 2023-10-27
Attending: EMERGENCY MEDICINE | Admitting: INTERNAL MEDICINE
Payer: COMMERCIAL

## 2023-01-01 ENCOUNTER — HOSPITAL ENCOUNTER (INPATIENT)
Facility: MEDICAL CENTER | Age: 48
LOS: 21 days | DRG: 853 | End: 2023-03-21
Attending: EMERGENCY MEDICINE | Admitting: HOSPITALIST
Payer: COMMERCIAL

## 2023-01-01 ENCOUNTER — HOSPITAL ENCOUNTER (OUTPATIENT)
Facility: MEDICAL CENTER | Age: 48
End: 2023-12-04
Attending: INTERNAL MEDICINE
Payer: COMMERCIAL

## 2023-01-01 ENCOUNTER — APPOINTMENT (OUTPATIENT)
Dept: RADIOLOGY | Facility: MEDICAL CENTER | Age: 48
DRG: 474 | End: 2023-01-01
Attending: INTERNAL MEDICINE
Payer: COMMERCIAL

## 2023-01-01 ENCOUNTER — APPOINTMENT (OUTPATIENT)
Dept: OCCUPATIONAL THERAPY | Facility: REHABILITATION | Age: 48
DRG: 559 | End: 2023-01-01
Attending: PHYSICAL MEDICINE & REHABILITATION
Payer: COMMERCIAL

## 2023-01-01 ENCOUNTER — APPOINTMENT (OUTPATIENT)
Dept: RADIOLOGY | Facility: MEDICAL CENTER | Age: 48
DRG: 853 | End: 2023-01-01
Attending: STUDENT IN AN ORGANIZED HEALTH CARE EDUCATION/TRAINING PROGRAM
Payer: COMMERCIAL

## 2023-01-01 ENCOUNTER — APPOINTMENT (OUTPATIENT)
Dept: OCCUPATIONAL THERAPY | Facility: REHABILITATION | Age: 48
DRG: 565 | End: 2023-01-01
Attending: PHYSICAL MEDICINE & REHABILITATION
Payer: COMMERCIAL

## 2023-01-01 ENCOUNTER — DOCUMENTATION (OUTPATIENT)
Dept: INFECTIOUS DISEASES | Facility: MEDICAL CENTER | Age: 48
End: 2023-01-01
Payer: COMMERCIAL

## 2023-01-01 ENCOUNTER — APPOINTMENT (OUTPATIENT)
Dept: PHYSICAL THERAPY | Facility: REHABILITATION | Age: 48
DRG: 565 | End: 2023-01-01
Attending: PHYSICAL MEDICINE & REHABILITATION
Payer: COMMERCIAL

## 2023-01-01 ENCOUNTER — HOSPITAL ENCOUNTER (OUTPATIENT)
Facility: MEDICAL CENTER | Age: 48
DRG: 474 | End: 2023-11-13
Attending: INTERNAL MEDICINE
Payer: COMMERCIAL

## 2023-01-01 ENCOUNTER — HOSPITAL ENCOUNTER (INPATIENT)
Facility: REHABILITATION | Age: 48
LOS: 22 days | DRG: 560 | End: 2023-04-12
Attending: PHYSICAL MEDICINE & REHABILITATION | Admitting: PHYSICAL MEDICINE & REHABILITATION
Payer: COMMERCIAL

## 2023-01-01 ENCOUNTER — HOSPITAL ENCOUNTER (OUTPATIENT)
Dept: RADIOLOGY | Facility: MEDICAL CENTER | Age: 48
End: 2023-01-01
Attending: ORTHOPAEDIC SURGERY
Payer: COMMERCIAL

## 2023-01-01 ENCOUNTER — APPOINTMENT (OUTPATIENT)
Dept: MEDICAL GROUP | Facility: MEDICAL CENTER | Age: 48
End: 2023-01-01
Payer: COMMERCIAL

## 2023-01-01 ENCOUNTER — APPOINTMENT (OUTPATIENT)
Dept: RADIOLOGY | Facility: MEDICAL CENTER | Age: 48
DRG: 853 | End: 2023-01-01
Attending: EMERGENCY MEDICINE
Payer: COMMERCIAL

## 2023-01-01 ENCOUNTER — APPOINTMENT (OUTPATIENT)
Dept: CARDIOLOGY | Facility: MEDICAL CENTER | Age: 48
End: 2023-01-01
Attending: STUDENT IN AN ORGANIZED HEALTH CARE EDUCATION/TRAINING PROGRAM
Payer: COMMERCIAL

## 2023-01-01 ENCOUNTER — APPOINTMENT (OUTPATIENT)
Dept: RADIOLOGY | Facility: IMAGING CENTER | Age: 48
End: 2023-01-01
Attending: FAMILY MEDICINE
Payer: COMMERCIAL

## 2023-01-01 ENCOUNTER — APPOINTMENT (OUTPATIENT)
Dept: RADIOLOGY | Facility: MEDICAL CENTER | Age: 48
DRG: 871 | End: 2023-01-01
Payer: COMMERCIAL

## 2023-01-01 ENCOUNTER — PHARMACY VISIT (OUTPATIENT)
Dept: PHARMACY | Facility: MEDICAL CENTER | Age: 48
End: 2023-01-01

## 2023-01-01 ENCOUNTER — APPOINTMENT (OUTPATIENT)
Dept: INPATIENT REHAB | Facility: REHABILITATION | Age: 48
DRG: 871 | End: 2023-01-01
Attending: EMERGENCY MEDICINE
Payer: COMMERCIAL

## 2023-01-01 ENCOUNTER — APPOINTMENT (OUTPATIENT)
Dept: RADIOLOGY | Facility: REHABILITATION | Age: 48
DRG: 559 | End: 2023-01-01
Attending: PHYSICAL MEDICINE & REHABILITATION
Payer: COMMERCIAL

## 2023-01-01 ENCOUNTER — APPOINTMENT (OUTPATIENT)
Dept: RADIOLOGY | Facility: MEDICAL CENTER | Age: 48
DRG: 474 | End: 2023-01-01
Attending: ORTHOPAEDIC SURGERY
Payer: COMMERCIAL

## 2023-01-01 ENCOUNTER — APPOINTMENT (OUTPATIENT)
Dept: CARDIOLOGY | Facility: MEDICAL CENTER | Age: 48
End: 2023-01-01
Attending: INTERNAL MEDICINE
Payer: COMMERCIAL

## 2023-01-01 ENCOUNTER — HOSPITAL ENCOUNTER (INPATIENT)
Facility: REHABILITATION | Age: 48
LOS: 16 days | DRG: 291 | End: 2023-09-05
Attending: PHYSICAL MEDICINE & REHABILITATION | Admitting: PHYSICAL MEDICINE & REHABILITATION
Payer: COMMERCIAL

## 2023-01-01 ENCOUNTER — OFFICE VISIT (OUTPATIENT)
Dept: URGENT CARE | Facility: PHYSICIAN GROUP | Age: 48
End: 2023-01-01
Payer: COMMERCIAL

## 2023-01-01 ENCOUNTER — APPOINTMENT (OUTPATIENT)
Dept: RADIOLOGY | Facility: MEDICAL CENTER | Age: 48
DRG: 291 | End: 2023-01-01
Attending: EMERGENCY MEDICINE
Payer: COMMERCIAL

## 2023-01-01 ENCOUNTER — ANESTHESIA (OUTPATIENT)
Dept: SURGERY | Facility: MEDICAL CENTER | Age: 48
DRG: 463 | End: 2023-01-01
Payer: COMMERCIAL

## 2023-01-01 ENCOUNTER — ANESTHESIA (OUTPATIENT)
Dept: SURGERY | Facility: MEDICAL CENTER | Age: 48
DRG: 474 | End: 2023-01-01
Payer: COMMERCIAL

## 2023-01-01 ENCOUNTER — APPOINTMENT (OUTPATIENT)
Dept: RADIOLOGY | Facility: REHABILITATION | Age: 48
DRG: 291 | End: 2023-01-01
Attending: HOSPITALIST
Payer: COMMERCIAL

## 2023-01-01 ENCOUNTER — TELEPHONE (OUTPATIENT)
Dept: MEDICAL GROUP | Facility: MEDICAL CENTER | Age: 48
End: 2023-01-01

## 2023-01-01 ENCOUNTER — APPOINTMENT (OUTPATIENT)
Dept: PHYSICAL THERAPY | Facility: REHABILITATION | Age: 48
DRG: 559 | End: 2023-01-01
Attending: PHYSICAL MEDICINE & REHABILITATION
Payer: COMMERCIAL

## 2023-01-01 ENCOUNTER — APPOINTMENT (OUTPATIENT)
Dept: CARDIOLOGY | Facility: MEDICAL CENTER | Age: 48
DRG: 871 | End: 2023-01-01
Attending: INTERNAL MEDICINE
Payer: COMMERCIAL

## 2023-01-01 ENCOUNTER — APPOINTMENT (OUTPATIENT)
Dept: OCCUPATIONAL THERAPY | Facility: REHABILITATION | Age: 48
End: 2023-01-01
Attending: PHYSICAL MEDICINE & REHABILITATION
Payer: COMMERCIAL

## 2023-01-01 ENCOUNTER — ANESTHESIA EVENT (OUTPATIENT)
Dept: SURGERY | Facility: MEDICAL CENTER | Age: 48
DRG: 474 | End: 2023-01-01
Payer: COMMERCIAL

## 2023-01-01 ENCOUNTER — HOSPITAL ENCOUNTER (OUTPATIENT)
Facility: MEDICAL CENTER | Age: 48
End: 2023-11-27
Attending: INTERNAL MEDICINE
Payer: COMMERCIAL

## 2023-01-01 ENCOUNTER — APPOINTMENT (OUTPATIENT)
Dept: PHYSICAL MEDICINE AND REHAB | Facility: MEDICAL CENTER | Age: 48
End: 2023-01-01
Payer: COMMERCIAL

## 2023-01-01 ENCOUNTER — APPOINTMENT (OUTPATIENT)
Dept: RADIOLOGY | Facility: REHABILITATION | Age: 48
DRG: 560 | End: 2023-01-01
Attending: PHYSICAL MEDICINE & REHABILITATION
Payer: COMMERCIAL

## 2023-01-01 ENCOUNTER — APPOINTMENT (OUTPATIENT)
Dept: PHYSICAL THERAPY | Facility: REHABILITATION | Age: 48
End: 2023-01-01
Attending: PHYSICAL MEDICINE & REHABILITATION
Payer: COMMERCIAL

## 2023-01-01 ENCOUNTER — HOSPITAL ENCOUNTER (INPATIENT)
Facility: REHABILITATION | Age: 48
LOS: 4 days | DRG: 559 | End: 2023-12-26
Attending: PHYSICAL MEDICINE & REHABILITATION | Admitting: PHYSICAL MEDICINE & REHABILITATION
Payer: COMMERCIAL

## 2023-01-01 ENCOUNTER — HOSPITAL ENCOUNTER (OUTPATIENT)
Dept: RADIOLOGY | Facility: MEDICAL CENTER | Age: 48
End: 2023-01-28
Attending: ORTHOPAEDIC SURGERY
Payer: COMMERCIAL

## 2023-01-01 ENCOUNTER — HOSPITAL ENCOUNTER (INPATIENT)
Facility: REHABILITATION | Age: 48
LOS: 8 days | DRG: 565 | End: 2023-11-04
Attending: PHYSICAL MEDICINE & REHABILITATION | Admitting: PHYSICAL MEDICINE & REHABILITATION
Payer: COMMERCIAL

## 2023-01-01 ENCOUNTER — APPOINTMENT (OUTPATIENT)
Dept: RADIOLOGY | Facility: MEDICAL CENTER | Age: 48
DRG: 474 | End: 2023-01-01
Attending: HOSPITALIST
Payer: COMMERCIAL

## 2023-01-01 ENCOUNTER — ANESTHESIA EVENT (OUTPATIENT)
Dept: SURGERY | Facility: MEDICAL CENTER | Age: 48
DRG: 463 | End: 2023-01-01
Payer: COMMERCIAL

## 2023-01-01 ENCOUNTER — APPOINTMENT (OUTPATIENT)
Dept: RADIOLOGY | Facility: MEDICAL CENTER | Age: 48
DRG: 463 | End: 2023-01-01
Attending: INTERNAL MEDICINE
Payer: COMMERCIAL

## 2023-01-01 ENCOUNTER — APPOINTMENT (OUTPATIENT)
Dept: RADIOLOGY | Facility: MEDICAL CENTER | Age: 48
DRG: 474 | End: 2023-01-01
Attending: NURSE PRACTITIONER
Payer: COMMERCIAL

## 2023-01-01 ENCOUNTER — APPOINTMENT (OUTPATIENT)
Dept: INPATIENT REHAB | Facility: REHABILITATION | Age: 48
DRG: 559 | End: 2023-01-01
Payer: COMMERCIAL

## 2023-01-01 ENCOUNTER — HOME HEALTH ADMISSION (OUTPATIENT)
Dept: HOME HEALTH SERVICES | Facility: HOME HEALTHCARE | Age: 48
End: 2023-01-01
Payer: COMMERCIAL

## 2023-01-01 ENCOUNTER — APPOINTMENT (OUTPATIENT)
Dept: INFECTIOUS DISEASES | Facility: MEDICAL CENTER | Age: 48
End: 2023-01-01
Attending: NURSE PRACTITIONER
Payer: COMMERCIAL

## 2023-01-01 ENCOUNTER — APPOINTMENT (OUTPATIENT)
Dept: CARDIOLOGY | Facility: MEDICAL CENTER | Age: 48
DRG: 474 | End: 2023-01-01
Attending: INTERNAL MEDICINE
Payer: COMMERCIAL

## 2023-01-01 ENCOUNTER — APPOINTMENT (OUTPATIENT)
Dept: CARDIOLOGY | Facility: MEDICAL CENTER | Age: 48
DRG: 291 | End: 2023-01-01
Attending: INTERNAL MEDICINE
Payer: COMMERCIAL

## 2023-01-01 ENCOUNTER — ANESTHESIA EVENT (OUTPATIENT)
Dept: SURGERY | Facility: MEDICAL CENTER | Age: 48
DRG: 853 | End: 2023-01-01
Payer: COMMERCIAL

## 2023-01-01 ENCOUNTER — OFFICE VISIT (OUTPATIENT)
Dept: MEDICAL GROUP | Facility: MEDICAL CENTER | Age: 48
End: 2023-01-01
Payer: COMMERCIAL

## 2023-01-01 ENCOUNTER — APPOINTMENT (OUTPATIENT)
Dept: RADIOLOGY | Facility: MEDICAL CENTER | Age: 48
DRG: 871 | End: 2023-01-01
Attending: HOSPITALIST
Payer: COMMERCIAL

## 2023-01-01 ENCOUNTER — HOSPITAL ENCOUNTER (INPATIENT)
Facility: MEDICAL CENTER | Age: 48
LOS: 16 days | DRG: 474 | End: 2023-12-22
Attending: ORTHOPAEDIC SURGERY | Admitting: STUDENT IN AN ORGANIZED HEALTH CARE EDUCATION/TRAINING PROGRAM
Payer: COMMERCIAL

## 2023-01-01 ENCOUNTER — HOSPITAL ENCOUNTER (INPATIENT)
Facility: MEDICAL CENTER | Age: 48
LOS: 2 days | DRG: 871 | End: 2023-12-28
Attending: EMERGENCY MEDICINE | Admitting: INTERNAL MEDICINE
Payer: COMMERCIAL

## 2023-01-01 ENCOUNTER — APPOINTMENT (OUTPATIENT)
Dept: ADMISSIONS | Facility: MEDICAL CENTER | Age: 48
DRG: 474 | End: 2023-01-01
Attending: ORTHOPAEDIC SURGERY
Payer: COMMERCIAL

## 2023-01-01 ENCOUNTER — APPOINTMENT (OUTPATIENT)
Dept: OCCUPATIONAL THERAPY | Facility: REHABILITATION | Age: 48
DRG: 871 | End: 2023-01-01
Attending: PHYSICAL MEDICINE & REHABILITATION
Payer: COMMERCIAL

## 2023-01-01 ENCOUNTER — APPOINTMENT (OUTPATIENT)
Dept: PHYSICAL THERAPY | Facility: REHABILITATION | Age: 48
DRG: 871 | End: 2023-01-01
Attending: PHYSICAL MEDICINE & REHABILITATION
Payer: COMMERCIAL

## 2023-01-01 ENCOUNTER — APPOINTMENT (OUTPATIENT)
Dept: CARDIOLOGY | Facility: MEDICAL CENTER | Age: 48
DRG: 463 | End: 2023-01-01
Attending: INTERNAL MEDICINE
Payer: COMMERCIAL

## 2023-01-01 ENCOUNTER — APPOINTMENT (OUTPATIENT)
Dept: CARDIOLOGY | Facility: MEDICAL CENTER | Age: 48
DRG: 474 | End: 2023-01-01
Attending: HOSPITALIST
Payer: COMMERCIAL

## 2023-01-01 ENCOUNTER — ANESTHESIA (OUTPATIENT)
Dept: SURGERY | Facility: MEDICAL CENTER | Age: 48
DRG: 853 | End: 2023-01-01
Payer: COMMERCIAL

## 2023-01-01 ENCOUNTER — HOSPITAL ENCOUNTER (INPATIENT)
Facility: MEDICAL CENTER | Age: 48
LOS: 8 days | DRG: 291 | End: 2023-08-20
Attending: EMERGENCY MEDICINE | Admitting: INTERNAL MEDICINE
Payer: COMMERCIAL

## 2023-01-01 ENCOUNTER — APPOINTMENT (OUTPATIENT)
Dept: RADIOLOGY | Facility: REHABILITATION | Age: 48
DRG: 291 | End: 2023-01-01
Attending: PHYSICAL MEDICINE & REHABILITATION
Payer: COMMERCIAL

## 2023-01-01 ENCOUNTER — TELEPHONE (OUTPATIENT)
Dept: HEALTH INFORMATION MANAGEMENT | Facility: OTHER | Age: 48
End: 2023-01-01
Payer: COMMERCIAL

## 2023-01-01 ENCOUNTER — HOSPITAL ENCOUNTER (OUTPATIENT)
Facility: MEDICAL CENTER | Age: 48
End: 2023-04-16
Attending: PHYSICIAN ASSISTANT
Payer: COMMERCIAL

## 2023-01-01 ENCOUNTER — HOSPITAL ENCOUNTER (OUTPATIENT)
Facility: MEDICAL CENTER | Age: 48
End: 2023-11-20
Attending: INTERNAL MEDICINE
Payer: COMMERCIAL

## 2023-01-01 ENCOUNTER — HOSPITAL ENCOUNTER (OUTPATIENT)
Facility: MEDICAL CENTER | Age: 48
End: 2023-01-21
Attending: FAMILY MEDICINE
Payer: COMMERCIAL

## 2023-01-01 ENCOUNTER — APPOINTMENT (OUTPATIENT)
Dept: RADIOLOGY | Facility: MEDICAL CENTER | Age: 48
DRG: 559 | End: 2023-01-01
Attending: HOSPITALIST
Payer: COMMERCIAL

## 2023-01-01 ENCOUNTER — APPOINTMENT (OUTPATIENT)
Dept: CARDIOLOGY | Facility: MEDICAL CENTER | Age: 48
DRG: 474 | End: 2023-01-01
Attending: NURSE PRACTITIONER
Payer: COMMERCIAL

## 2023-01-01 ENCOUNTER — APPOINTMENT (OUTPATIENT)
Dept: RADIOLOGY | Facility: IMAGING CENTER | Age: 48
End: 2023-01-01
Attending: PHYSICIAN ASSISTANT
Payer: COMMERCIAL

## 2023-01-01 ENCOUNTER — PATIENT MESSAGE (OUTPATIENT)
Dept: CARDIOLOGY | Facility: MEDICAL CENTER | Age: 48
End: 2023-01-01

## 2023-01-01 ENCOUNTER — APPOINTMENT (OUTPATIENT)
Dept: RADIOLOGY | Facility: MEDICAL CENTER | Age: 48
DRG: 871 | End: 2023-01-01
Attending: EMERGENCY MEDICINE
Payer: COMMERCIAL

## 2023-01-01 ENCOUNTER — TELEMEDICINE (OUTPATIENT)
Dept: MEDICAL GROUP | Facility: MEDICAL CENTER | Age: 48
End: 2023-01-01
Payer: COMMERCIAL

## 2023-01-01 ENCOUNTER — APPOINTMENT (OUTPATIENT)
Dept: PHYSICAL THERAPY | Facility: REHABILITATION | Age: 48
End: 2023-01-01
Payer: COMMERCIAL

## 2023-01-01 VITALS
DIASTOLIC BLOOD PRESSURE: 58 MMHG | SYSTOLIC BLOOD PRESSURE: 112 MMHG | WEIGHT: 210 LBS | TEMPERATURE: 96.6 F | OXYGEN SATURATION: 95 % | HEIGHT: 70 IN | BODY MASS INDEX: 30.06 KG/M2 | HEART RATE: 86 BPM

## 2023-01-01 VITALS
RESPIRATION RATE: 18 BRPM | TEMPERATURE: 97.1 F | HEIGHT: 71 IN | DIASTOLIC BLOOD PRESSURE: 74 MMHG | BODY MASS INDEX: 26.74 KG/M2 | WEIGHT: 191 LBS | OXYGEN SATURATION: 92 % | HEART RATE: 78 BPM | SYSTOLIC BLOOD PRESSURE: 107 MMHG

## 2023-01-01 VITALS
DIASTOLIC BLOOD PRESSURE: 85 MMHG | HEIGHT: 71 IN | SYSTOLIC BLOOD PRESSURE: 130 MMHG | WEIGHT: 210 LBS | BODY MASS INDEX: 29.4 KG/M2 | HEART RATE: 102 BPM

## 2023-01-01 VITALS
HEART RATE: 92 BPM | DIASTOLIC BLOOD PRESSURE: 75 MMHG | WEIGHT: 205.03 LBS | RESPIRATION RATE: 18 BRPM | HEIGHT: 71 IN | SYSTOLIC BLOOD PRESSURE: 133 MMHG | OXYGEN SATURATION: 91 % | TEMPERATURE: 97.7 F | BODY MASS INDEX: 28.7 KG/M2

## 2023-01-01 VITALS
DIASTOLIC BLOOD PRESSURE: 70 MMHG | WEIGHT: 195 LBS | HEIGHT: 62 IN | RESPIRATION RATE: 18 BRPM | TEMPERATURE: 97 F | BODY MASS INDEX: 35.88 KG/M2 | SYSTOLIC BLOOD PRESSURE: 120 MMHG | HEART RATE: 65 BPM | OXYGEN SATURATION: 92 %

## 2023-01-01 VITALS
TEMPERATURE: 97.4 F | BODY MASS INDEX: 29.48 KG/M2 | HEART RATE: 71 BPM | DIASTOLIC BLOOD PRESSURE: 47 MMHG | OXYGEN SATURATION: 94 % | WEIGHT: 205.91 LBS | SYSTOLIC BLOOD PRESSURE: 96 MMHG | HEIGHT: 70 IN | RESPIRATION RATE: 18 BRPM

## 2023-01-01 VITALS
DIASTOLIC BLOOD PRESSURE: 59 MMHG | RESPIRATION RATE: 20 BRPM | WEIGHT: 196.87 LBS | HEIGHT: 71 IN | SYSTOLIC BLOOD PRESSURE: 108 MMHG | OXYGEN SATURATION: 93 % | BODY MASS INDEX: 27.56 KG/M2 | HEART RATE: 79 BPM | TEMPERATURE: 97.2 F

## 2023-01-01 VITALS
OXYGEN SATURATION: 97 % | TEMPERATURE: 97.9 F | SYSTOLIC BLOOD PRESSURE: 108 MMHG | HEIGHT: 71 IN | WEIGHT: 201.72 LBS | DIASTOLIC BLOOD PRESSURE: 59 MMHG | RESPIRATION RATE: 18 BRPM | BODY MASS INDEX: 28.24 KG/M2 | HEART RATE: 58 BPM

## 2023-01-01 VITALS
SYSTOLIC BLOOD PRESSURE: 95 MMHG | TEMPERATURE: 98.2 F | WEIGHT: 229.94 LBS | DIASTOLIC BLOOD PRESSURE: 56 MMHG | HEIGHT: 70 IN | RESPIRATION RATE: 16 BRPM | OXYGEN SATURATION: 96 % | BODY MASS INDEX: 32.92 KG/M2 | HEART RATE: 67 BPM

## 2023-01-01 VITALS
SYSTOLIC BLOOD PRESSURE: 101 MMHG | DIASTOLIC BLOOD PRESSURE: 65 MMHG | RESPIRATION RATE: 22 BRPM | WEIGHT: 214.95 LBS | HEIGHT: 71 IN | TEMPERATURE: 98.2 F | HEART RATE: 87 BPM | OXYGEN SATURATION: 95 % | BODY MASS INDEX: 30.09 KG/M2

## 2023-01-01 VITALS
RESPIRATION RATE: 18 BRPM | DIASTOLIC BLOOD PRESSURE: 78 MMHG | HEIGHT: 70 IN | WEIGHT: 191 LBS | BODY MASS INDEX: 27.35 KG/M2 | HEART RATE: 77 BPM | SYSTOLIC BLOOD PRESSURE: 118 MMHG | TEMPERATURE: 97.2 F | OXYGEN SATURATION: 94 %

## 2023-01-01 VITALS
WEIGHT: 214.51 LBS | SYSTOLIC BLOOD PRESSURE: 113 MMHG | TEMPERATURE: 97 F | DIASTOLIC BLOOD PRESSURE: 78 MMHG | BODY MASS INDEX: 39.47 KG/M2 | OXYGEN SATURATION: 97 % | RESPIRATION RATE: 17 BRPM | HEART RATE: 62 BPM | HEIGHT: 62 IN

## 2023-01-01 VITALS — WEIGHT: 205 LBS | HEIGHT: 71 IN | BODY MASS INDEX: 28.7 KG/M2

## 2023-01-01 VITALS
OXYGEN SATURATION: 98 % | TEMPERATURE: 97.2 F | RESPIRATION RATE: 18 BRPM | BODY MASS INDEX: 35.92 KG/M2 | DIASTOLIC BLOOD PRESSURE: 88 MMHG | HEART RATE: 71 BPM | SYSTOLIC BLOOD PRESSURE: 124 MMHG | WEIGHT: 195.2 LBS | HEIGHT: 62 IN

## 2023-01-01 VITALS
SYSTOLIC BLOOD PRESSURE: 96 MMHG | OXYGEN SATURATION: 79 % | TEMPERATURE: 98.1 F | BODY MASS INDEX: 27.69 KG/M2 | DIASTOLIC BLOOD PRESSURE: 54 MMHG | WEIGHT: 197.75 LBS | HEART RATE: 101 BPM | RESPIRATION RATE: 26 BRPM | HEIGHT: 71 IN

## 2023-01-01 DIAGNOSIS — I13.10 CARDIORENAL SYNDROME WITH RENAL FAILURE, STAGE 1-4 OR UNSPECIFIED CHRONIC KIDNEY DISEASE, WITHOUT HEART FAILURE: ICD-10-CM

## 2023-01-01 DIAGNOSIS — J18.9 PNEUMONIA OF LEFT LUNG DUE TO INFECTIOUS ORGANISM, UNSPECIFIED PART OF LUNG: ICD-10-CM

## 2023-01-01 DIAGNOSIS — Z74.09 IMPAIRED MOBILITY AND ADLS: ICD-10-CM

## 2023-01-01 DIAGNOSIS — S90.32XA CONTUSION OF LEFT FOOT, INITIAL ENCOUNTER: ICD-10-CM

## 2023-01-01 DIAGNOSIS — G89.4 CHRONIC PAIN SYNDROME: ICD-10-CM

## 2023-01-01 DIAGNOSIS — I50.43 CHF (CONGESTIVE HEART FAILURE), NYHA CLASS I, ACUTE ON CHRONIC, COMBINED (HCC): ICD-10-CM

## 2023-01-01 DIAGNOSIS — L97.519 ULCER OF RIGHT FOOT, UNSPECIFIED ULCER STAGE (HCC): ICD-10-CM

## 2023-01-01 DIAGNOSIS — N17.9 SEPSIS WITH ACUTE RENAL FAILURE WITHOUT SEPTIC SHOCK, DUE TO UNSPECIFIED ORGANISM, UNSPECIFIED ACUTE RENAL FAILURE TYPE (HCC): ICD-10-CM

## 2023-01-01 DIAGNOSIS — I05.8 MITRAL VALVE MASS: ICD-10-CM

## 2023-01-01 DIAGNOSIS — F39 MOOD DISORDER (HCC): ICD-10-CM

## 2023-01-01 DIAGNOSIS — M25.572 ACUTE BILATERAL ANKLE PAIN: ICD-10-CM

## 2023-01-01 DIAGNOSIS — M79.672 LEFT FOOT PAIN: ICD-10-CM

## 2023-01-01 DIAGNOSIS — M05.79 RHEUMATOID ARTHRITIS INVOLVING MULTIPLE SITES WITH POSITIVE RHEUMATOID FACTOR (HCC): ICD-10-CM

## 2023-01-01 DIAGNOSIS — M81.6 LOCALIZED OSTEOPOROSIS WITHOUT CURRENT PATHOLOGICAL FRACTURE: Chronic | ICD-10-CM

## 2023-01-01 DIAGNOSIS — F41.1 GAD (GENERALIZED ANXIETY DISORDER): ICD-10-CM

## 2023-01-01 DIAGNOSIS — E55.9 VITAMIN D DEFICIENCY: ICD-10-CM

## 2023-01-01 DIAGNOSIS — R78.81 BACTEREMIA DUE TO STAPHYLOCOCCUS AUREUS: ICD-10-CM

## 2023-01-01 DIAGNOSIS — B95.61 BACTEREMIA DUE TO STAPHYLOCOCCUS AUREUS: ICD-10-CM

## 2023-01-01 DIAGNOSIS — R05.1 ACUTE COUGH: ICD-10-CM

## 2023-01-01 DIAGNOSIS — G93.40 ENCEPHALOPATHY: ICD-10-CM

## 2023-01-01 DIAGNOSIS — E87.1 HYPONATREMIA: ICD-10-CM

## 2023-01-01 DIAGNOSIS — G89.29 CHRONIC PAIN OF RIGHT KNEE: Chronic | ICD-10-CM

## 2023-01-01 DIAGNOSIS — J18.9 COMMUNITY ACQUIRED PNEUMONIA, UNSPECIFIED LATERALITY: ICD-10-CM

## 2023-01-01 DIAGNOSIS — M25.561 CHRONIC PAIN OF RIGHT KNEE: Chronic | ICD-10-CM

## 2023-01-01 DIAGNOSIS — M79.671 RIGHT FOOT PAIN: ICD-10-CM

## 2023-01-01 DIAGNOSIS — R53.81 DEBILITY: ICD-10-CM

## 2023-01-01 DIAGNOSIS — Z87.11 HISTORY OF BLEEDING ULCERS: ICD-10-CM

## 2023-01-01 DIAGNOSIS — M25.571 ACUTE BILATERAL ANKLE PAIN: ICD-10-CM

## 2023-01-01 DIAGNOSIS — B37.2 CANDIDIASIS OF SKIN: ICD-10-CM

## 2023-01-01 DIAGNOSIS — L97.512 ULCER OF BOTH FEET WITH FAT LAYER EXPOSED (HCC): ICD-10-CM

## 2023-01-01 DIAGNOSIS — K42.9 UMBILICAL HERNIA WITHOUT OBSTRUCTION AND WITHOUT GANGRENE: ICD-10-CM

## 2023-01-01 DIAGNOSIS — I50.31 ACUTE DIASTOLIC HEART FAILURE (HCC): ICD-10-CM

## 2023-01-01 DIAGNOSIS — M65.9: ICD-10-CM

## 2023-01-01 DIAGNOSIS — F39 MOOD DISORDER (HCC): Chronic | ICD-10-CM

## 2023-01-01 DIAGNOSIS — L08.9 WOUND INFECTION: ICD-10-CM

## 2023-01-01 DIAGNOSIS — I77.810 AORTIC ROOT DILATATION (HCC): Chronic | ICD-10-CM

## 2023-01-01 DIAGNOSIS — Z78.9 IMPAIRED MOBILITY AND ADLS: ICD-10-CM

## 2023-01-01 DIAGNOSIS — R79.89 ELEVATED LIVER FUNCTION TESTS: ICD-10-CM

## 2023-01-01 DIAGNOSIS — S93.314A DISLOCATION OF RIGHT SUBTALAR JOINT, INITIAL ENCOUNTER: ICD-10-CM

## 2023-01-01 DIAGNOSIS — Z79.891 CHRONIC USE OF OPIATE DRUG FOR THERAPEUTIC PURPOSE: ICD-10-CM

## 2023-01-01 DIAGNOSIS — K21.9 GASTROESOPHAGEAL REFLUX DISEASE, UNSPECIFIED WHETHER ESOPHAGITIS PRESENT: ICD-10-CM

## 2023-01-01 DIAGNOSIS — J02.9 SORE THROAT: ICD-10-CM

## 2023-01-01 DIAGNOSIS — Z87.39 HISTORY OF OSTEOMYELITIS: ICD-10-CM

## 2023-01-01 DIAGNOSIS — M06.9 RHEUMATOID ARTHRITIS, INVOLVING UNSPECIFIED SITE, UNSPECIFIED WHETHER RHEUMATOID FACTOR PRESENT (HCC): Chronic | ICD-10-CM

## 2023-01-01 DIAGNOSIS — I50.9 ACUTE CONGESTIVE HEART FAILURE, UNSPECIFIED HEART FAILURE TYPE (HCC): ICD-10-CM

## 2023-01-01 DIAGNOSIS — R74.01 TRANSAMINITIS: ICD-10-CM

## 2023-01-01 DIAGNOSIS — N17.9 AKI (ACUTE KIDNEY INJURY) (HCC): ICD-10-CM

## 2023-01-01 DIAGNOSIS — Z87.891 HISTORY OF CIGARETTE SMOKING: ICD-10-CM

## 2023-01-01 DIAGNOSIS — L03.116 CELLULITIS OF LEFT LOWER EXTREMITY: ICD-10-CM

## 2023-01-01 DIAGNOSIS — I50.41 ACUTE COMBINED SYSTOLIC AND DIASTOLIC HEART FAILURE (HCC): ICD-10-CM

## 2023-01-01 DIAGNOSIS — R09.81 SINUS CONGESTION: ICD-10-CM

## 2023-01-01 DIAGNOSIS — R73.9 BLOOD GLUCOSE ELEVATED: ICD-10-CM

## 2023-01-01 DIAGNOSIS — D64.9 NORMOCYTIC ANEMIA: ICD-10-CM

## 2023-01-01 DIAGNOSIS — T87.9 BKA STUMP COMPLICATION (HCC): ICD-10-CM

## 2023-01-01 DIAGNOSIS — I35.0 AORTIC STENOSIS, MODERATE: Chronic | ICD-10-CM

## 2023-01-01 DIAGNOSIS — G93.41 SEPSIS WITH ENCEPHALOPATHY WITHOUT SEPTIC SHOCK, DUE TO UNSPECIFIED ORGANISM (HCC): ICD-10-CM

## 2023-01-01 DIAGNOSIS — R74.8 ELEVATED LIVER ENZYMES: ICD-10-CM

## 2023-01-01 DIAGNOSIS — M21.41 ACQUIRED PES PLANOVALGUS OF RIGHT FOOT: ICD-10-CM

## 2023-01-01 DIAGNOSIS — L97.522 ULCER OF BOTH FEET WITH FAT LAYER EXPOSED (HCC): ICD-10-CM

## 2023-01-01 DIAGNOSIS — Z86.79 HISTORY OF ATRIAL FIBRILLATION: ICD-10-CM

## 2023-01-01 DIAGNOSIS — R53.1 GENERALIZED WEAKNESS: ICD-10-CM

## 2023-01-01 DIAGNOSIS — R65.20 SEPSIS WITH ENCEPHALOPATHY WITHOUT SEPTIC SHOCK, DUE TO UNSPECIFIED ORGANISM (HCC): ICD-10-CM

## 2023-01-01 DIAGNOSIS — Z89.512 HX OF BKA, LEFT (HCC): ICD-10-CM

## 2023-01-01 DIAGNOSIS — R29.6 MULTIPLE FALLS: ICD-10-CM

## 2023-01-01 DIAGNOSIS — R65.20 SEVERE SEPSIS (HCC): ICD-10-CM

## 2023-01-01 DIAGNOSIS — M86.9 OSTEOMYELITIS OF LEFT LEG (HCC): ICD-10-CM

## 2023-01-01 DIAGNOSIS — E66.3 OVERWEIGHT WITH BODY MASS INDEX (BMI) OF 27 TO 27.9 IN ADULT: ICD-10-CM

## 2023-01-01 DIAGNOSIS — A49.9 POLYMICROBIAL BACTERIAL INFECTION: ICD-10-CM

## 2023-01-01 DIAGNOSIS — Z92.25 HISTORY OF IMMUNOSUPPRESSIVE THERAPY: Chronic | ICD-10-CM

## 2023-01-01 DIAGNOSIS — Z89.512 S/P BKA (BELOW KNEE AMPUTATION) UNILATERAL, LEFT (HCC): ICD-10-CM

## 2023-01-01 DIAGNOSIS — T14.8XXA WOUND INFECTION: ICD-10-CM

## 2023-01-01 DIAGNOSIS — G47.33 OSA (OBSTRUCTIVE SLEEP APNEA): Chronic | ICD-10-CM

## 2023-01-01 DIAGNOSIS — L02.416 ABSCESS OF LEFT LOWER EXTREMITY: ICD-10-CM

## 2023-01-01 DIAGNOSIS — T87.44 INFECTION OF AMPUTATION STUMP OF LEFT LOWER EXTREMITY (HCC): ICD-10-CM

## 2023-01-01 DIAGNOSIS — G89.29 OTHER CHRONIC PAIN: ICD-10-CM

## 2023-01-01 DIAGNOSIS — E66.9 OBESITY (BMI 30-39.9): ICD-10-CM

## 2023-01-01 DIAGNOSIS — I27.20 PULMONARY HYPERTENSION (HCC): ICD-10-CM

## 2023-01-01 DIAGNOSIS — F51.01 PRIMARY INSOMNIA: ICD-10-CM

## 2023-01-01 DIAGNOSIS — I44.7 LEFT BUNDLE BRANCH BLOCK: ICD-10-CM

## 2023-01-01 DIAGNOSIS — R78.81 MSSA BACTEREMIA: ICD-10-CM

## 2023-01-01 DIAGNOSIS — M25.571 ACUTE RIGHT ANKLE PAIN: ICD-10-CM

## 2023-01-01 DIAGNOSIS — A41.9 SEVERE SEPSIS (HCC): ICD-10-CM

## 2023-01-01 DIAGNOSIS — E16.2 HYPOGLYCEMIA: ICD-10-CM

## 2023-01-01 DIAGNOSIS — R65.20 SEPSIS WITH ACUTE RENAL FAILURE WITHOUT SEPTIC SHOCK, DUE TO UNSPECIFIED ORGANISM, UNSPECIFIED ACUTE RENAL FAILURE TYPE (HCC): ICD-10-CM

## 2023-01-01 DIAGNOSIS — I50.32 CHRONIC DIASTOLIC HEART FAILURE WITH PRESERVED EJECTION FRACTION (HCC): ICD-10-CM

## 2023-01-01 DIAGNOSIS — R41.82 ALTERED MENTAL STATUS, UNSPECIFIED ALTERED MENTAL STATUS TYPE: ICD-10-CM

## 2023-01-01 DIAGNOSIS — G47.9 DIFFICULTY SLEEPING: ICD-10-CM

## 2023-01-01 DIAGNOSIS — J18.9 PNEUMONIA DUE TO INFECTIOUS ORGANISM, UNSPECIFIED LATERALITY, UNSPECIFIED PART OF LUNG: ICD-10-CM

## 2023-01-01 DIAGNOSIS — E87.6 HYPOKALEMIA: ICD-10-CM

## 2023-01-01 DIAGNOSIS — B95.61 MSSA BACTEREMIA: ICD-10-CM

## 2023-01-01 DIAGNOSIS — A41.9 SEPSIS WITH ENCEPHALOPATHY WITHOUT SEPTIC SHOCK, DUE TO UNSPECIFIED ORGANISM (HCC): ICD-10-CM

## 2023-01-01 DIAGNOSIS — M05.79 RHEUMATOID ARTHRITIS INVOLVING MULTIPLE SITES WITH POSITIVE RHEUMATOID FACTOR (HCC): Chronic | ICD-10-CM

## 2023-01-01 DIAGNOSIS — R06.2 WHEEZING: ICD-10-CM

## 2023-01-01 DIAGNOSIS — A41.9 SEPSIS WITH ACUTE RENAL FAILURE WITHOUT SEPTIC SHOCK, DUE TO UNSPECIFIED ORGANISM, UNSPECIFIED ACUTE RENAL FAILURE TYPE (HCC): ICD-10-CM

## 2023-01-01 LAB
1,25(OH)2D3 SERPL-MCNC: 10.1 PG/ML (ref 19.9–79.3)
25(OH)D3 SERPL-MCNC: 11 NG/ML (ref 30–100)
25(OH)D3 SERPL-MCNC: 16 NG/ML (ref 30–100)
25(OH)D3 SERPL-MCNC: 17 NG/ML (ref 30–100)
25(OH)D3 SERPL-MCNC: 21 NG/ML (ref 30–100)
ABO GROUP BLD: NORMAL
ALBUMIN SERPL BCP-MCNC: 1.8 G/DL (ref 3.2–4.9)
ALBUMIN SERPL BCP-MCNC: 1.8 G/DL (ref 3.2–4.9)
ALBUMIN SERPL BCP-MCNC: 1.9 G/DL (ref 3.2–4.9)
ALBUMIN SERPL BCP-MCNC: 2 G/DL (ref 3.2–4.9)
ALBUMIN SERPL BCP-MCNC: 2 G/DL (ref 3.2–4.9)
ALBUMIN SERPL BCP-MCNC: 2.1 G/DL (ref 3.2–4.9)
ALBUMIN SERPL BCP-MCNC: 2.2 G/DL (ref 3.2–4.9)
ALBUMIN SERPL BCP-MCNC: 2.3 G/DL (ref 3.2–4.9)
ALBUMIN SERPL BCP-MCNC: 2.4 G/DL (ref 3.2–4.9)
ALBUMIN SERPL BCP-MCNC: 2.5 G/DL (ref 3.2–4.9)
ALBUMIN SERPL BCP-MCNC: 2.6 G/DL (ref 3.2–4.9)
ALBUMIN SERPL BCP-MCNC: 2.7 G/DL (ref 3.2–4.9)
ALBUMIN SERPL BCP-MCNC: 2.8 G/DL (ref 3.2–4.9)
ALBUMIN SERPL BCP-MCNC: 2.9 G/DL (ref 3.2–4.9)
ALBUMIN SERPL BCP-MCNC: 3 G/DL (ref 3.2–4.9)
ALBUMIN SERPL BCP-MCNC: 3 G/DL (ref 3.2–4.9)
ALBUMIN SERPL BCP-MCNC: 3.1 G/DL (ref 3.2–4.9)
ALBUMIN SERPL BCP-MCNC: 3.1 G/DL (ref 3.2–4.9)
ALBUMIN SERPL BCP-MCNC: 3.2 G/DL (ref 3.2–4.9)
ALBUMIN SERPL BCP-MCNC: 3.3 G/DL (ref 3.2–4.9)
ALBUMIN SERPL BCP-MCNC: 3.4 G/DL (ref 3.2–4.9)
ALBUMIN SERPL BCP-MCNC: 3.4 G/DL (ref 3.2–4.9)
ALBUMIN SERPL BCP-MCNC: 3.6 G/DL (ref 3.2–4.9)
ALBUMIN/GLOB SERPL: 0.4 G/DL
ALBUMIN/GLOB SERPL: 0.5 G/DL
ALBUMIN/GLOB SERPL: 0.6 G/DL
ALBUMIN/GLOB SERPL: 0.7 G/DL
ALBUMIN/GLOB SERPL: 0.8 G/DL
ALBUMIN/GLOB SERPL: 0.9 G/DL
ALBUMIN/GLOB SERPL: 1 G/DL
ALBUMIN/GLOB SERPL: 1.1 G/DL
ALP SERPL-CCNC: 111 U/L (ref 30–99)
ALP SERPL-CCNC: 111 U/L (ref 30–99)
ALP SERPL-CCNC: 112 U/L (ref 30–99)
ALP SERPL-CCNC: 115 U/L (ref 30–99)
ALP SERPL-CCNC: 117 U/L (ref 30–99)
ALP SERPL-CCNC: 118 U/L (ref 30–99)
ALP SERPL-CCNC: 119 U/L (ref 30–99)
ALP SERPL-CCNC: 121 U/L (ref 30–99)
ALP SERPL-CCNC: 121 U/L (ref 30–99)
ALP SERPL-CCNC: 122 U/L (ref 30–99)
ALP SERPL-CCNC: 122 U/L (ref 30–99)
ALP SERPL-CCNC: 125 U/L (ref 30–99)
ALP SERPL-CCNC: 127 U/L (ref 30–99)
ALP SERPL-CCNC: 128 U/L (ref 30–99)
ALP SERPL-CCNC: 130 U/L (ref 30–99)
ALP SERPL-CCNC: 132 U/L (ref 30–99)
ALP SERPL-CCNC: 143 U/L (ref 30–99)
ALP SERPL-CCNC: 143 U/L (ref 30–99)
ALP SERPL-CCNC: 144 U/L (ref 30–99)
ALP SERPL-CCNC: 146 U/L (ref 30–99)
ALP SERPL-CCNC: 149 U/L (ref 30–99)
ALP SERPL-CCNC: 149 U/L (ref 30–99)
ALP SERPL-CCNC: 161 U/L (ref 30–99)
ALP SERPL-CCNC: 161 U/L (ref 30–99)
ALP SERPL-CCNC: 165 U/L (ref 30–99)
ALP SERPL-CCNC: 182 U/L (ref 30–99)
ALP SERPL-CCNC: 183 U/L (ref 30–99)
ALP SERPL-CCNC: 185 U/L (ref 30–99)
ALP SERPL-CCNC: 192 U/L (ref 30–99)
ALP SERPL-CCNC: 194 U/L (ref 30–99)
ALP SERPL-CCNC: 203 U/L (ref 30–99)
ALP SERPL-CCNC: 218 U/L (ref 30–99)
ALP SERPL-CCNC: 218 U/L (ref 30–99)
ALP SERPL-CCNC: 225 U/L (ref 30–99)
ALP SERPL-CCNC: 226 U/L (ref 30–99)
ALP SERPL-CCNC: 229 U/L (ref 30–99)
ALP SERPL-CCNC: 230 U/L (ref 30–99)
ALP SERPL-CCNC: 239 U/L (ref 30–99)
ALP SERPL-CCNC: 245 U/L (ref 30–99)
ALP SERPL-CCNC: 247 U/L (ref 30–99)
ALP SERPL-CCNC: 247 U/L (ref 30–99)
ALP SERPL-CCNC: 248 U/L (ref 30–99)
ALP SERPL-CCNC: 249 U/L (ref 30–99)
ALP SERPL-CCNC: 253 U/L (ref 30–99)
ALP SERPL-CCNC: 256 U/L (ref 30–99)
ALP SERPL-CCNC: 258 U/L (ref 30–99)
ALP SERPL-CCNC: 260 U/L (ref 30–99)
ALP SERPL-CCNC: 311 U/L (ref 30–99)
ALP SERPL-CCNC: 314 U/L (ref 30–99)
ALP SERPL-CCNC: 403 U/L (ref 30–99)
ALP SERPL-CCNC: 94 U/L (ref 30–99)
ALP SERPL-CCNC: 95 U/L (ref 30–99)
ALP SERPL-CCNC: 98 U/L (ref 30–99)
ALT SERPL-CCNC: 11 U/L (ref 2–50)
ALT SERPL-CCNC: 11 U/L (ref 2–50)
ALT SERPL-CCNC: 12 U/L (ref 2–50)
ALT SERPL-CCNC: 127 U/L (ref 2–50)
ALT SERPL-CCNC: 131 U/L (ref 2–50)
ALT SERPL-CCNC: 15 U/L (ref 2–50)
ALT SERPL-CCNC: 16 U/L (ref 2–50)
ALT SERPL-CCNC: 161 U/L (ref 2–50)
ALT SERPL-CCNC: 17 U/L (ref 2–50)
ALT SERPL-CCNC: 18 U/L (ref 2–50)
ALT SERPL-CCNC: 186 U/L (ref 2–50)
ALT SERPL-CCNC: 19 U/L (ref 2–50)
ALT SERPL-CCNC: 191 U/L (ref 2–50)
ALT SERPL-CCNC: 20 U/L (ref 2–50)
ALT SERPL-CCNC: 21 U/L (ref 2–50)
ALT SERPL-CCNC: 23 U/L (ref 2–50)
ALT SERPL-CCNC: 23 U/L (ref 2–50)
ALT SERPL-CCNC: 230 U/L (ref 2–50)
ALT SERPL-CCNC: 233 U/L (ref 2–50)
ALT SERPL-CCNC: 240 U/L (ref 2–50)
ALT SERPL-CCNC: 242 U/L (ref 2–50)
ALT SERPL-CCNC: 248 U/L (ref 2–50)
ALT SERPL-CCNC: 281 U/L (ref 2–50)
ALT SERPL-CCNC: 29 U/L (ref 2–50)
ALT SERPL-CCNC: 299 U/L (ref 2–50)
ALT SERPL-CCNC: 30 U/L (ref 2–50)
ALT SERPL-CCNC: 307 U/L (ref 2–50)
ALT SERPL-CCNC: 33 U/L (ref 2–50)
ALT SERPL-CCNC: 332 U/L (ref 2–50)
ALT SERPL-CCNC: 342 U/L (ref 2–50)
ALT SERPL-CCNC: 357 U/L (ref 2–50)
ALT SERPL-CCNC: 416 U/L (ref 2–50)
ALT SERPL-CCNC: 44 U/L (ref 2–50)
ALT SERPL-CCNC: 493 U/L (ref 2–50)
ALT SERPL-CCNC: 498 U/L (ref 2–50)
ALT SERPL-CCNC: 54 U/L (ref 2–50)
ALT SERPL-CCNC: 55 U/L (ref 2–50)
ALT SERPL-CCNC: 577 U/L (ref 2–50)
ALT SERPL-CCNC: 61 U/L (ref 2–50)
ALT SERPL-CCNC: 616 U/L (ref 2–50)
ALT SERPL-CCNC: 71 U/L (ref 2–50)
ALT SERPL-CCNC: 74 U/L (ref 2–50)
ALT SERPL-CCNC: 8 U/L (ref 2–50)
ALT SERPL-CCNC: 81 U/L (ref 2–50)
ALT SERPL-CCNC: <5 U/L (ref 2–50)
AMMONIA PLAS-SCNC: 22 UMOL/L (ref 11–45)
AMMONIA PLAS-SCNC: 23 UMOL/L (ref 11–45)
AMMONIA PLAS-SCNC: 28 UMOL/L (ref 11–45)
AMPHET UR QL SCN: NEGATIVE
AMPHET UR QL SCN: NEGATIVE
ANION GAP SERPL CALC-SCNC: 10 MMOL/L (ref 7–16)
ANION GAP SERPL CALC-SCNC: 11 MMOL/L (ref 7–16)
ANION GAP SERPL CALC-SCNC: 12 MMOL/L (ref 7–16)
ANION GAP SERPL CALC-SCNC: 13 MMOL/L (ref 7–16)
ANION GAP SERPL CALC-SCNC: 14 MMOL/L (ref 7–16)
ANION GAP SERPL CALC-SCNC: 15 MMOL/L (ref 7–16)
ANION GAP SERPL CALC-SCNC: 16 MMOL/L (ref 7–16)
ANION GAP SERPL CALC-SCNC: 17 MMOL/L (ref 7–16)
ANION GAP SERPL CALC-SCNC: 18 MMOL/L (ref 7–16)
ANION GAP SERPL CALC-SCNC: 19 MMOL/L (ref 7–16)
ANION GAP SERPL CALC-SCNC: 19 MMOL/L (ref 7–16)
ANION GAP SERPL CALC-SCNC: 22 MMOL/L (ref 7–16)
ANION GAP SERPL CALC-SCNC: 22 MMOL/L (ref 7–16)
ANION GAP SERPL CALC-SCNC: 3 MMOL/L (ref 7–16)
ANION GAP SERPL CALC-SCNC: 5 MMOL/L (ref 7–16)
ANION GAP SERPL CALC-SCNC: 5 MMOL/L (ref 7–16)
ANION GAP SERPL CALC-SCNC: 6 MMOL/L (ref 7–16)
ANION GAP SERPL CALC-SCNC: 7 MMOL/L (ref 7–16)
ANION GAP SERPL CALC-SCNC: 8 MMOL/L (ref 7–16)
ANION GAP SERPL CALC-SCNC: 9 MMOL/L (ref 7–16)
ANISOCYTOSIS BLD QL SMEAR: ABNORMAL
APAP SERPL-MCNC: 11 UG/ML (ref 10–30)
APPEARANCE UR: ABNORMAL
APPEARANCE UR: ABNORMAL
APPEARANCE UR: CLEAR
APTT PPP: 27.1 SEC (ref 24.7–36)
APTT PPP: 34.6 SEC (ref 24.7–36)
APTT PPP: 37.2 SEC (ref 24.7–36)
AST SERPL-CCNC: 10 U/L (ref 12–45)
AST SERPL-CCNC: 1029 U/L (ref 12–45)
AST SERPL-CCNC: 104 U/L (ref 12–45)
AST SERPL-CCNC: 1119 U/L (ref 12–45)
AST SERPL-CCNC: 1126 U/L (ref 12–45)
AST SERPL-CCNC: 113 U/L (ref 12–45)
AST SERPL-CCNC: 1146 U/L (ref 12–45)
AST SERPL-CCNC: 1149 U/L (ref 12–45)
AST SERPL-CCNC: 115 U/L (ref 12–45)
AST SERPL-CCNC: 12 U/L (ref 12–45)
AST SERPL-CCNC: 12 U/L (ref 12–45)
AST SERPL-CCNC: 125 U/L (ref 12–45)
AST SERPL-CCNC: 13 U/L (ref 12–45)
AST SERPL-CCNC: 1323 U/L (ref 12–45)
AST SERPL-CCNC: 1489 U/L (ref 12–45)
AST SERPL-CCNC: 15 U/L (ref 12–45)
AST SERPL-CCNC: 1553 U/L (ref 12–45)
AST SERPL-CCNC: 17 U/L (ref 12–45)
AST SERPL-CCNC: 18 U/L (ref 12–45)
AST SERPL-CCNC: 18 U/L (ref 12–45)
AST SERPL-CCNC: 180 U/L (ref 12–45)
AST SERPL-CCNC: 19 U/L (ref 12–45)
AST SERPL-CCNC: 20 U/L (ref 12–45)
AST SERPL-CCNC: 20 U/L (ref 12–45)
AST SERPL-CCNC: 21 U/L (ref 12–45)
AST SERPL-CCNC: 23 U/L (ref 12–45)
AST SERPL-CCNC: 26 U/L (ref 12–45)
AST SERPL-CCNC: 26 U/L (ref 12–45)
AST SERPL-CCNC: 269 U/L (ref 12–45)
AST SERPL-CCNC: 29 U/L (ref 12–45)
AST SERPL-CCNC: 30 U/L (ref 12–45)
AST SERPL-CCNC: 31 U/L (ref 12–45)
AST SERPL-CCNC: 313 U/L (ref 12–45)
AST SERPL-CCNC: 32 U/L (ref 12–45)
AST SERPL-CCNC: 37 U/L (ref 12–45)
AST SERPL-CCNC: 37 U/L (ref 12–45)
AST SERPL-CCNC: 45 U/L (ref 12–45)
AST SERPL-CCNC: 517 U/L (ref 12–45)
AST SERPL-CCNC: 586 U/L (ref 12–45)
AST SERPL-CCNC: 606 U/L (ref 12–45)
AST SERPL-CCNC: 660 U/L (ref 12–45)
AST SERPL-CCNC: 67 U/L (ref 12–45)
AST SERPL-CCNC: 694 U/L (ref 12–45)
AST SERPL-CCNC: 7 U/L (ref 12–45)
AST SERPL-CCNC: 764 U/L (ref 12–45)
AST SERPL-CCNC: 790 U/L (ref 12–45)
AST SERPL-CCNC: 852 U/L (ref 12–45)
AST SERPL-CCNC: 9 U/L (ref 12–45)
AST SERPL-CCNC: 9 U/L (ref 12–45)
AST SERPL-CCNC: 994 U/L (ref 12–45)
BACTERIA #/AREA URNS HPF: NEGATIVE /HPF
BACTERIA BLD CULT: ABNORMAL
BACTERIA BLD CULT: NORMAL
BACTERIA SPEC ANAEROBE CULT: ABNORMAL
BACTERIA SPEC ANAEROBE CULT: ABNORMAL
BACTERIA SPEC ANAEROBE CULT: NORMAL
BACTERIA TISS AEROBE CULT: ABNORMAL
BACTERIA TISS AEROBE CULT: NORMAL
BACTERIA UR CULT: ABNORMAL
BACTERIA UR CULT: NORMAL
BACTERIA WND AEROBE CULT: ABNORMAL
BARBITURATES UR QL SCN: NEGATIVE
BARBITURATES UR QL SCN: NEGATIVE
BASE EXCESS BLDA CALC-SCNC: 0 MMOL/L (ref -4–3)
BASE EXCESS BLDV CALC-SCNC: -3 MMOL/L
BASE EXCESS BLDV CALC-SCNC: -5 MMOL/L
BASOPHILS # BLD AUTO: 0 % (ref 0–1.8)
BASOPHILS # BLD AUTO: 0.1 % (ref 0–1.8)
BASOPHILS # BLD AUTO: 0.2 % (ref 0–1.8)
BASOPHILS # BLD AUTO: 0.3 % (ref 0–1.8)
BASOPHILS # BLD AUTO: 0.3 % (ref 0–1.8)
BASOPHILS # BLD AUTO: 0.4 % (ref 0–1.8)
BASOPHILS # BLD AUTO: 0.5 % (ref 0–1.8)
BASOPHILS # BLD AUTO: 0.8 % (ref 0–1.8)
BASOPHILS # BLD AUTO: 0.9 % (ref 0–1.8)
BASOPHILS # BLD AUTO: 1 % (ref 0–1.8)
BASOPHILS # BLD AUTO: 1.2 % (ref 0–1.8)
BASOPHILS # BLD AUTO: 1.2 % (ref 0–1.8)
BASOPHILS # BLD AUTO: 1.3 % (ref 0–1.8)
BASOPHILS # BLD AUTO: 1.4 % (ref 0–1.8)
BASOPHILS # BLD: 0 K/UL (ref 0–0.12)
BASOPHILS # BLD: 0.01 K/UL (ref 0–0.12)
BASOPHILS # BLD: 0.02 K/UL (ref 0–0.12)
BASOPHILS # BLD: 0.03 K/UL (ref 0–0.12)
BASOPHILS # BLD: 0.04 K/UL (ref 0–0.12)
BASOPHILS # BLD: 0.05 K/UL (ref 0–0.12)
BASOPHILS # BLD: 0.05 K/UL (ref 0–0.12)
BASOPHILS # BLD: 0.06 K/UL (ref 0–0.12)
BASOPHILS # BLD: 0.07 K/UL (ref 0–0.12)
BASOPHILS # BLD: 0.08 K/UL (ref 0–0.12)
BASOPHILS # BLD: 0.09 K/UL (ref 0–0.12)
BASOPHILS # BLD: 0.09 K/UL (ref 0–0.12)
BENZODIAZ UR QL SCN: NEGATIVE
BENZODIAZ UR QL SCN: NEGATIVE
BILIRUB CONJ SERPL-MCNC: 0.3 MG/DL (ref 0.1–0.5)
BILIRUB INDIRECT SERPL-MCNC: 0.2 MG/DL (ref 0–1)
BILIRUB SERPL-MCNC: 0.2 MG/DL (ref 0.1–1.5)
BILIRUB SERPL-MCNC: 0.3 MG/DL (ref 0.1–1.5)
BILIRUB SERPL-MCNC: 0.4 MG/DL (ref 0.1–1.5)
BILIRUB SERPL-MCNC: 0.5 MG/DL (ref 0.1–1.5)
BILIRUB SERPL-MCNC: 0.6 MG/DL (ref 0.1–1.5)
BILIRUB SERPL-MCNC: 0.6 MG/DL (ref 0.1–1.5)
BILIRUB SERPL-MCNC: 0.8 MG/DL (ref 0.1–1.5)
BILIRUB SERPL-MCNC: 0.8 MG/DL (ref 0.1–1.5)
BILIRUB SERPL-MCNC: 0.9 MG/DL (ref 0.1–1.5)
BILIRUB SERPL-MCNC: 1 MG/DL (ref 0.1–1.5)
BILIRUB SERPL-MCNC: 1.2 MG/DL (ref 0.1–1.5)
BILIRUB SERPL-MCNC: 1.3 MG/DL (ref 0.1–1.5)
BILIRUB SERPL-MCNC: 1.4 MG/DL (ref 0.1–1.5)
BILIRUB SERPL-MCNC: 1.4 MG/DL (ref 0.1–1.5)
BILIRUB SERPL-MCNC: 1.5 MG/DL (ref 0.1–1.5)
BILIRUB SERPL-MCNC: 1.7 MG/DL (ref 0.1–1.5)
BILIRUB SERPL-MCNC: 1.8 MG/DL (ref 0.1–1.5)
BILIRUB SERPL-MCNC: 2.3 MG/DL (ref 0.1–1.5)
BILIRUB SERPL-MCNC: 2.4 MG/DL (ref 0.1–1.5)
BILIRUB SERPL-MCNC: 2.5 MG/DL (ref 0.1–1.5)
BILIRUB SERPL-MCNC: 2.5 MG/DL (ref 0.1–1.5)
BILIRUB SERPL-MCNC: 2.6 MG/DL (ref 0.1–1.5)
BILIRUB SERPL-MCNC: 2.8 MG/DL (ref 0.1–1.5)
BILIRUB UR QL STRIP.AUTO: ABNORMAL
BILIRUB UR QL STRIP.AUTO: ABNORMAL
BILIRUB UR QL STRIP.AUTO: NEGATIVE
BLD GP AB SCN SERPL QL: NORMAL
BLOOD CULTURE HOLD CXBCH: NORMAL
BODY TEMPERATURE: 36.8 CENTIGRADE
BODY TEMPERATURE: 37.4 CENTIGRADE
BODY TEMPERATURE: 37.9 CENTIGRADE
BUN SERPL-MCNC: 10 MG/DL (ref 8–22)
BUN SERPL-MCNC: 10 MG/DL (ref 8–22)
BUN SERPL-MCNC: 11 MG/DL (ref 8–22)
BUN SERPL-MCNC: 12 MG/DL (ref 8–22)
BUN SERPL-MCNC: 12 MG/DL (ref 8–22)
BUN SERPL-MCNC: 13 MG/DL (ref 8–22)
BUN SERPL-MCNC: 13 MG/DL (ref 8–22)
BUN SERPL-MCNC: 14 MG/DL (ref 8–22)
BUN SERPL-MCNC: 14 MG/DL (ref 8–22)
BUN SERPL-MCNC: 15 MG/DL (ref 8–22)
BUN SERPL-MCNC: 16 MG/DL (ref 8–22)
BUN SERPL-MCNC: 17 MG/DL (ref 8–22)
BUN SERPL-MCNC: 18 MG/DL (ref 8–22)
BUN SERPL-MCNC: 19 MG/DL (ref 8–22)
BUN SERPL-MCNC: 20 MG/DL (ref 8–22)
BUN SERPL-MCNC: 22 MG/DL (ref 8–22)
BUN SERPL-MCNC: 22 MG/DL (ref 8–22)
BUN SERPL-MCNC: 23 MG/DL (ref 8–22)
BUN SERPL-MCNC: 23 MG/DL (ref 8–22)
BUN SERPL-MCNC: 24 MG/DL (ref 8–22)
BUN SERPL-MCNC: 25 MG/DL (ref 8–22)
BUN SERPL-MCNC: 25 MG/DL (ref 8–22)
BUN SERPL-MCNC: 30 MG/DL (ref 8–22)
BUN SERPL-MCNC: 31 MG/DL (ref 8–22)
BUN SERPL-MCNC: 32 MG/DL (ref 8–22)
BUN SERPL-MCNC: 34 MG/DL (ref 8–22)
BUN SERPL-MCNC: 34 MG/DL (ref 8–22)
BUN SERPL-MCNC: 36 MG/DL (ref 8–22)
BUN SERPL-MCNC: 37 MG/DL (ref 8–22)
BUN SERPL-MCNC: 38 MG/DL (ref 8–22)
BUN SERPL-MCNC: 39 MG/DL (ref 8–22)
BUN SERPL-MCNC: 40 MG/DL (ref 8–22)
BUN SERPL-MCNC: 42 MG/DL (ref 8–22)
BUN SERPL-MCNC: 45 MG/DL (ref 8–22)
BUN SERPL-MCNC: 49 MG/DL (ref 8–22)
BUN SERPL-MCNC: 5 MG/DL (ref 8–22)
BUN SERPL-MCNC: 50 MG/DL (ref 8–22)
BUN SERPL-MCNC: 56 MG/DL (ref 8–22)
BUN SERPL-MCNC: 6 MG/DL (ref 8–22)
BUN SERPL-MCNC: 7 MG/DL (ref 8–22)
BUN SERPL-MCNC: 8 MG/DL (ref 8–22)
BUN SERPL-MCNC: 9 MG/DL (ref 8–22)
BZE UR QL SCN: NEGATIVE
BZE UR QL SCN: NEGATIVE
CA-I SERPL-SCNC: 1.1 MMOL/L (ref 1.1–1.3)
CALCIUM ALBUM COR SERPL-MCNC: 8.1 MG/DL (ref 8.5–10.5)
CALCIUM ALBUM COR SERPL-MCNC: 8.2 MG/DL (ref 8.5–10.5)
CALCIUM ALBUM COR SERPL-MCNC: 8.3 MG/DL (ref 8.5–10.5)
CALCIUM ALBUM COR SERPL-MCNC: 8.4 MG/DL (ref 8.5–10.5)
CALCIUM ALBUM COR SERPL-MCNC: 8.4 MG/DL (ref 8.5–10.5)
CALCIUM ALBUM COR SERPL-MCNC: 8.5 MG/DL (ref 8.5–10.5)
CALCIUM ALBUM COR SERPL-MCNC: 8.6 MG/DL (ref 8.5–10.5)
CALCIUM ALBUM COR SERPL-MCNC: 8.7 MG/DL (ref 8.5–10.5)
CALCIUM ALBUM COR SERPL-MCNC: 8.8 MG/DL (ref 8.5–10.5)
CALCIUM ALBUM COR SERPL-MCNC: 8.9 MG/DL (ref 8.5–10.5)
CALCIUM ALBUM COR SERPL-MCNC: 9 MG/DL (ref 8.5–10.5)
CALCIUM ALBUM COR SERPL-MCNC: 9.1 MG/DL (ref 8.5–10.5)
CALCIUM ALBUM COR SERPL-MCNC: 9.2 MG/DL (ref 8.5–10.5)
CALCIUM ALBUM COR SERPL-MCNC: 9.2 MG/DL (ref 8.5–10.5)
CALCIUM ALBUM COR SERPL-MCNC: 9.3 MG/DL (ref 8.5–10.5)
CALCIUM ALBUM COR SERPL-MCNC: 9.4 MG/DL (ref 8.5–10.5)
CALCIUM ALBUM COR SERPL-MCNC: 9.7 MG/DL (ref 8.5–10.5)
CALCIUM SERPL-MCNC: 6.8 MG/DL (ref 8.5–10.5)
CALCIUM SERPL-MCNC: 6.9 MG/DL (ref 8.5–10.5)
CALCIUM SERPL-MCNC: 7 MG/DL (ref 8.5–10.5)
CALCIUM SERPL-MCNC: 7.1 MG/DL (ref 8.5–10.5)
CALCIUM SERPL-MCNC: 7.2 MG/DL (ref 8.5–10.5)
CALCIUM SERPL-MCNC: 7.2 MG/DL (ref 8.5–10.5)
CALCIUM SERPL-MCNC: 7.4 MG/DL (ref 8.5–10.5)
CALCIUM SERPL-MCNC: 7.5 MG/DL (ref 8.5–10.5)
CALCIUM SERPL-MCNC: 7.6 MG/DL (ref 8.4–10.2)
CALCIUM SERPL-MCNC: 7.6 MG/DL (ref 8.5–10.5)
CALCIUM SERPL-MCNC: 7.7 MG/DL (ref 8.4–10.2)
CALCIUM SERPL-MCNC: 7.7 MG/DL (ref 8.5–10.5)
CALCIUM SERPL-MCNC: 7.8 MG/DL (ref 8.4–10.2)
CALCIUM SERPL-MCNC: 7.8 MG/DL (ref 8.5–10.5)
CALCIUM SERPL-MCNC: 7.9 MG/DL (ref 8.4–10.2)
CALCIUM SERPL-MCNC: 7.9 MG/DL (ref 8.5–10.5)
CALCIUM SERPL-MCNC: 8 MG/DL (ref 8.4–10.2)
CALCIUM SERPL-MCNC: 8 MG/DL (ref 8.5–10.5)
CALCIUM SERPL-MCNC: 8.1 MG/DL (ref 8.5–10.5)
CALCIUM SERPL-MCNC: 8.2 MG/DL (ref 8.5–10.5)
CALCIUM SERPL-MCNC: 8.3 MG/DL (ref 8.4–10.2)
CALCIUM SERPL-MCNC: 8.3 MG/DL (ref 8.5–10.5)
CALCIUM SERPL-MCNC: 8.4 MG/DL (ref 8.5–10.5)
CALCIUM SERPL-MCNC: 8.5 MG/DL (ref 8.4–10.2)
CALCIUM SERPL-MCNC: 8.5 MG/DL (ref 8.4–10.2)
CALCIUM SERPL-MCNC: 8.5 MG/DL (ref 8.5–10.5)
CALCIUM SERPL-MCNC: 8.5 MG/DL (ref 8.5–10.5)
CALCIUM SERPL-MCNC: 8.6 MG/DL (ref 8.5–10.5)
CALCIUM SERPL-MCNC: 8.7 MG/DL (ref 8.5–10.5)
CANNABINOIDS UR QL SCN: NEGATIVE
CANNABINOIDS UR QL SCN: NEGATIVE
CCP IGA+IGG SERPL IA-ACNC: 150 UNITS (ref 0–19)
CERULOPLASMIN SERPL-MCNC: 23 MG/DL (ref 15–30)
CHLORIDE SERPL-SCNC: 100 MMOL/L (ref 96–112)
CHLORIDE SERPL-SCNC: 101 MMOL/L (ref 96–112)
CHLORIDE SERPL-SCNC: 102 MMOL/L (ref 96–112)
CHLORIDE SERPL-SCNC: 103 MMOL/L (ref 96–112)
CHLORIDE SERPL-SCNC: 104 MMOL/L (ref 96–112)
CHLORIDE SERPL-SCNC: 105 MMOL/L (ref 96–112)
CHLORIDE SERPL-SCNC: 106 MMOL/L (ref 96–112)
CHLORIDE SERPL-SCNC: 86 MMOL/L (ref 96–112)
CHLORIDE SERPL-SCNC: 87 MMOL/L (ref 96–112)
CHLORIDE SERPL-SCNC: 87 MMOL/L (ref 96–112)
CHLORIDE SERPL-SCNC: 89 MMOL/L (ref 96–112)
CHLORIDE SERPL-SCNC: 90 MMOL/L (ref 96–112)
CHLORIDE SERPL-SCNC: 91 MMOL/L (ref 96–112)
CHLORIDE SERPL-SCNC: 92 MMOL/L (ref 96–112)
CHLORIDE SERPL-SCNC: 93 MMOL/L (ref 96–112)
CHLORIDE SERPL-SCNC: 93 MMOL/L (ref 96–112)
CHLORIDE SERPL-SCNC: 94 MMOL/L (ref 96–112)
CHLORIDE SERPL-SCNC: 95 MMOL/L (ref 96–112)
CHLORIDE SERPL-SCNC: 96 MMOL/L (ref 96–112)
CHLORIDE SERPL-SCNC: 97 MMOL/L (ref 96–112)
CHLORIDE SERPL-SCNC: 98 MMOL/L (ref 96–112)
CHLORIDE SERPL-SCNC: 99 MMOL/L (ref 96–112)
CHOLEST SERPL-MCNC: 69 MG/DL (ref 100–199)
CO2 SERPL-SCNC: 12 MMOL/L (ref 20–33)
CO2 SERPL-SCNC: 13 MMOL/L (ref 20–33)
CO2 SERPL-SCNC: 14 MMOL/L (ref 20–33)
CO2 SERPL-SCNC: 16 MMOL/L (ref 20–33)
CO2 SERPL-SCNC: 17 MMOL/L (ref 20–33)
CO2 SERPL-SCNC: 18 MMOL/L (ref 20–33)
CO2 SERPL-SCNC: 19 MMOL/L (ref 20–33)
CO2 SERPL-SCNC: 20 MMOL/L (ref 20–33)
CO2 SERPL-SCNC: 21 MMOL/L (ref 20–33)
CO2 SERPL-SCNC: 22 MMOL/L (ref 20–33)
CO2 SERPL-SCNC: 23 MMOL/L (ref 20–33)
CO2 SERPL-SCNC: 24 MMOL/L (ref 20–33)
CO2 SERPL-SCNC: 25 MMOL/L (ref 20–33)
CO2 SERPL-SCNC: 26 MMOL/L (ref 20–33)
CO2 SERPL-SCNC: 27 MMOL/L (ref 20–33)
CO2 SERPL-SCNC: 28 MMOL/L (ref 20–33)
CO2 SERPL-SCNC: 29 MMOL/L (ref 20–33)
CO2 SERPL-SCNC: 29 MMOL/L (ref 20–33)
CO2 SERPL-SCNC: 30 MMOL/L (ref 20–33)
CO2 SERPL-SCNC: 31 MMOL/L (ref 20–33)
CO2 SERPL-SCNC: 33 MMOL/L (ref 20–33)
CO2 SERPL-SCNC: 35 MMOL/L (ref 20–33)
COLOR UR: ABNORMAL
COLOR UR: ABNORMAL
COLOR UR: NORMAL
COLOR UR: YELLOW
COLOR UR: YELLOW
COMMENT 1642: NORMAL
CORTIS SERPL-MCNC: 14.8 UG/DL (ref 0–23)
CORTIS SERPL-MCNC: 26.8 UG/DL (ref 0–23)
CORTIS SERPL-MCNC: 39.2 UG/DL (ref 0–23)
CORTIS SERPL-MCNC: 45.5 UG/DL (ref 0–23)
CORTIS SERPL-MCNC: 8.5 UG/DL (ref 0–23)
COVID ORDER STATUS COVID19: NORMAL
CREAT SERPL-MCNC: 0.35 MG/DL (ref 0.5–1.4)
CREAT SERPL-MCNC: 0.39 MG/DL (ref 0.5–1.4)
CREAT SERPL-MCNC: 0.42 MG/DL (ref 0.5–1.4)
CREAT SERPL-MCNC: 0.43 MG/DL (ref 0.5–1.4)
CREAT SERPL-MCNC: 0.44 MG/DL (ref 0.5–1.4)
CREAT SERPL-MCNC: 0.45 MG/DL (ref 0.5–1.4)
CREAT SERPL-MCNC: 0.46 MG/DL (ref 0.5–1.4)
CREAT SERPL-MCNC: 0.48 MG/DL (ref 0.5–1.4)
CREAT SERPL-MCNC: 0.48 MG/DL (ref 0.5–1.4)
CREAT SERPL-MCNC: 0.49 MG/DL (ref 0.5–1.4)
CREAT SERPL-MCNC: 0.5 MG/DL (ref 0.5–1.4)
CREAT SERPL-MCNC: 0.52 MG/DL (ref 0.5–1.4)
CREAT SERPL-MCNC: 0.52 MG/DL (ref 0.5–1.4)
CREAT SERPL-MCNC: 0.53 MG/DL (ref 0.5–1.4)
CREAT SERPL-MCNC: 0.54 MG/DL (ref 0.5–1.4)
CREAT SERPL-MCNC: 0.55 MG/DL (ref 0.5–1.4)
CREAT SERPL-MCNC: 0.56 MG/DL (ref 0.5–1.4)
CREAT SERPL-MCNC: 0.57 MG/DL (ref 0.5–1.4)
CREAT SERPL-MCNC: 0.59 MG/DL (ref 0.5–1.4)
CREAT SERPL-MCNC: 0.59 MG/DL (ref 0.5–1.4)
CREAT SERPL-MCNC: 0.6 MG/DL (ref 0.5–1.4)
CREAT SERPL-MCNC: 0.6 MG/DL (ref 0.5–1.4)
CREAT SERPL-MCNC: 0.63 MG/DL (ref 0.5–1.4)
CREAT SERPL-MCNC: 0.63 MG/DL (ref 0.5–1.4)
CREAT SERPL-MCNC: 0.64 MG/DL (ref 0.5–1.4)
CREAT SERPL-MCNC: 0.64 MG/DL (ref 0.5–1.4)
CREAT SERPL-MCNC: 0.65 MG/DL (ref 0.5–1.4)
CREAT SERPL-MCNC: 0.68 MG/DL (ref 0.5–1.4)
CREAT SERPL-MCNC: 0.71 MG/DL (ref 0.5–1.4)
CREAT SERPL-MCNC: 0.72 MG/DL (ref 0.5–1.4)
CREAT SERPL-MCNC: 0.75 MG/DL (ref 0.5–1.4)
CREAT SERPL-MCNC: 0.75 MG/DL (ref 0.5–1.4)
CREAT SERPL-MCNC: 0.77 MG/DL (ref 0.5–1.4)
CREAT SERPL-MCNC: 0.78 MG/DL (ref 0.5–1.4)
CREAT SERPL-MCNC: 0.79 MG/DL (ref 0.5–1.4)
CREAT SERPL-MCNC: 0.81 MG/DL (ref 0.5–1.4)
CREAT SERPL-MCNC: 0.83 MG/DL (ref 0.5–1.4)
CREAT SERPL-MCNC: 0.87 MG/DL (ref 0.5–1.4)
CREAT SERPL-MCNC: 0.89 MG/DL (ref 0.5–1.4)
CREAT SERPL-MCNC: 0.9 MG/DL (ref 0.5–1.4)
CREAT SERPL-MCNC: 0.9 MG/DL (ref 0.5–1.4)
CREAT SERPL-MCNC: 0.91 MG/DL (ref 0.5–1.4)
CREAT SERPL-MCNC: 0.91 MG/DL (ref 0.5–1.4)
CREAT SERPL-MCNC: 0.93 MG/DL (ref 0.5–1.4)
CREAT SERPL-MCNC: 0.93 MG/DL (ref 0.5–1.4)
CREAT SERPL-MCNC: 0.96 MG/DL (ref 0.5–1.4)
CREAT SERPL-MCNC: 1.02 MG/DL (ref 0.5–1.4)
CREAT SERPL-MCNC: 1.02 MG/DL (ref 0.5–1.4)
CREAT SERPL-MCNC: 1.03 MG/DL (ref 0.5–1.4)
CREAT SERPL-MCNC: 1.05 MG/DL (ref 0.5–1.4)
CREAT SERPL-MCNC: 1.13 MG/DL (ref 0.5–1.4)
CREAT SERPL-MCNC: 1.28 MG/DL (ref 0.5–1.4)
CREAT SERPL-MCNC: 1.38 MG/DL (ref 0.5–1.4)
CREAT SERPL-MCNC: 1.4 MG/DL (ref 0.5–1.4)
CREAT SERPL-MCNC: 1.68 MG/DL (ref 0.5–1.4)
CREAT SERPL-MCNC: 1.7 MG/DL (ref 0.5–1.4)
CREAT SERPL-MCNC: 1.81 MG/DL (ref 0.5–1.4)
CREAT SERPL-MCNC: 1.82 MG/DL (ref 0.5–1.4)
CREAT SERPL-MCNC: 1.87 MG/DL (ref 0.5–1.4)
CREAT SERPL-MCNC: 1.88 MG/DL (ref 0.5–1.4)
CREAT SERPL-MCNC: 1.91 MG/DL (ref 0.5–1.4)
CREAT SERPL-MCNC: 1.92 MG/DL (ref 0.5–1.4)
CREAT SERPL-MCNC: 2 MG/DL (ref 0.5–1.4)
CREAT SERPL-MCNC: 2.01 MG/DL (ref 0.5–1.4)
CREAT SERPL-MCNC: 2.02 MG/DL (ref 0.5–1.4)
CREAT SERPL-MCNC: 2.04 MG/DL (ref 0.5–1.4)
CREAT SERPL-MCNC: 2.1 MG/DL (ref 0.5–1.4)
CREAT SERPL-MCNC: 2.34 MG/DL (ref 0.5–1.4)
CREAT SERPL-MCNC: 2.37 MG/DL (ref 0.5–1.4)
CREAT SERPL-MCNC: 2.4 MG/DL (ref 0.5–1.4)
CREAT SERPL-MCNC: 2.44 MG/DL (ref 0.5–1.4)
CREAT SERPL-MCNC: 2.52 MG/DL (ref 0.5–1.4)
CREAT SERPL-MCNC: 2.63 MG/DL (ref 0.5–1.4)
CREAT SERPL-MCNC: 2.66 MG/DL (ref 0.5–1.4)
CREAT SERPL-MCNC: 2.68 MG/DL (ref 0.5–1.4)
CREAT SERPL-MCNC: 2.68 MG/DL (ref 0.5–1.4)
CREAT SERPL-MCNC: 2.71 MG/DL (ref 0.5–1.4)
CREAT UR-MCNC: 107.07 MG/DL
CREAT UR-MCNC: 118.97 MG/DL
CREAT UR-MCNC: 38.37 MG/DL
CRP SERPL HS-MCNC: 0.43 MG/DL (ref 0–0.75)
CRP SERPL HS-MCNC: 0.99 MG/DL (ref 0–0.75)
CRP SERPL HS-MCNC: 1.88 MG/DL (ref 0–0.75)
CRP SERPL HS-MCNC: 10.37 MG/DL (ref 0–0.75)
CRP SERPL HS-MCNC: 11.66 MG/DL (ref 0–0.75)
CRP SERPL HS-MCNC: 12.06 MG/DL (ref 0–0.75)
CRP SERPL HS-MCNC: 13.05 MG/DL (ref 0–0.75)
CRP SERPL HS-MCNC: 18.81 MG/DL (ref 0–0.75)
CRP SERPL HS-MCNC: 6.85 MG/DL (ref 0–0.75)
EKG IMPRESSION: NORMAL
EOSINOPHIL # BLD AUTO: 0 K/UL (ref 0–0.51)
EOSINOPHIL # BLD AUTO: 0.01 K/UL (ref 0–0.51)
EOSINOPHIL # BLD AUTO: 0.03 K/UL (ref 0–0.51)
EOSINOPHIL # BLD AUTO: 0.04 K/UL (ref 0–0.51)
EOSINOPHIL # BLD AUTO: 0.05 K/UL (ref 0–0.51)
EOSINOPHIL # BLD AUTO: 0.05 K/UL (ref 0–0.51)
EOSINOPHIL # BLD AUTO: 0.09 K/UL (ref 0–0.51)
EOSINOPHIL # BLD AUTO: 0.09 K/UL (ref 0–0.51)
EOSINOPHIL # BLD AUTO: 0.1 K/UL (ref 0–0.51)
EOSINOPHIL # BLD AUTO: 0.1 K/UL (ref 0–0.51)
EOSINOPHIL # BLD AUTO: 0.11 K/UL (ref 0–0.51)
EOSINOPHIL # BLD AUTO: 0.16 K/UL (ref 0–0.51)
EOSINOPHIL # BLD AUTO: 0.18 K/UL (ref 0–0.51)
EOSINOPHIL # BLD AUTO: 0.18 K/UL (ref 0–0.51)
EOSINOPHIL # BLD AUTO: 0.19 K/UL (ref 0–0.51)
EOSINOPHIL # BLD AUTO: 0.19 K/UL (ref 0–0.51)
EOSINOPHIL # BLD AUTO: 0.22 K/UL (ref 0–0.51)
EOSINOPHIL # BLD AUTO: 0.23 K/UL (ref 0–0.51)
EOSINOPHIL # BLD AUTO: 0.23 K/UL (ref 0–0.51)
EOSINOPHIL # BLD AUTO: 0.42 K/UL (ref 0–0.51)
EOSINOPHIL # BLD AUTO: 0.55 K/UL (ref 0–0.51)
EOSINOPHIL NFR BLD: 0 % (ref 0–6.9)
EOSINOPHIL NFR BLD: 0.1 % (ref 0–6.9)
EOSINOPHIL NFR BLD: 0.1 % (ref 0–6.9)
EOSINOPHIL NFR BLD: 0.2 % (ref 0–6.9)
EOSINOPHIL NFR BLD: 0.3 % (ref 0–6.9)
EOSINOPHIL NFR BLD: 0.3 % (ref 0–6.9)
EOSINOPHIL NFR BLD: 0.4 % (ref 0–6.9)
EOSINOPHIL NFR BLD: 0.4 % (ref 0–6.9)
EOSINOPHIL NFR BLD: 0.5 % (ref 0–6.9)
EOSINOPHIL NFR BLD: 0.6 % (ref 0–6.9)
EOSINOPHIL NFR BLD: 0.8 % (ref 0–6.9)
EOSINOPHIL NFR BLD: 0.9 % (ref 0–6.9)
EOSINOPHIL NFR BLD: 1 % (ref 0–6.9)
EOSINOPHIL NFR BLD: 1.2 % (ref 0–6.9)
EOSINOPHIL NFR BLD: 1.5 % (ref 0–6.9)
EOSINOPHIL NFR BLD: 1.7 % (ref 0–6.9)
EOSINOPHIL NFR BLD: 2.3 % (ref 0–6.9)
EOSINOPHIL NFR BLD: 2.7 % (ref 0–6.9)
EOSINOPHIL NFR BLD: 2.7 % (ref 0–6.9)
EOSINOPHIL NFR BLD: 2.8 % (ref 0–6.9)
EOSINOPHIL NFR BLD: 3.2 % (ref 0–6.9)
EOSINOPHIL NFR BLD: 3.3 % (ref 0–6.9)
EOSINOPHIL NFR BLD: 3.8 % (ref 0–6.9)
EOSINOPHIL NFR BLD: 6.7 % (ref 0–6.9)
EOSINOPHIL NFR BLD: 8.3 % (ref 0–6.9)
EPI CELLS #/AREA URNS HPF: ABNORMAL /HPF
EPI CELLS #/AREA URNS HPF: ABNORMAL /HPF
EPI CELLS #/AREA URNS HPF: NEGATIVE /HPF
EPI CELLS #/AREA URNS HPF: NEGATIVE /HPF
ERYTHROCYTE [DISTWIDTH] IN BLOOD BY AUTOMATED COUNT: 45.5 FL (ref 35.9–50)
ERYTHROCYTE [DISTWIDTH] IN BLOOD BY AUTOMATED COUNT: 46.3 FL (ref 35.9–50)
ERYTHROCYTE [DISTWIDTH] IN BLOOD BY AUTOMATED COUNT: 46.3 FL (ref 35.9–50)
ERYTHROCYTE [DISTWIDTH] IN BLOOD BY AUTOMATED COUNT: 46.9 FL (ref 35.9–50)
ERYTHROCYTE [DISTWIDTH] IN BLOOD BY AUTOMATED COUNT: 46.9 FL (ref 35.9–50)
ERYTHROCYTE [DISTWIDTH] IN BLOOD BY AUTOMATED COUNT: 47.2 FL (ref 35.9–50)
ERYTHROCYTE [DISTWIDTH] IN BLOOD BY AUTOMATED COUNT: 47.6 FL (ref 35.9–50)
ERYTHROCYTE [DISTWIDTH] IN BLOOD BY AUTOMATED COUNT: 47.8 FL (ref 35.9–50)
ERYTHROCYTE [DISTWIDTH] IN BLOOD BY AUTOMATED COUNT: 47.9 FL (ref 35.9–50)
ERYTHROCYTE [DISTWIDTH] IN BLOOD BY AUTOMATED COUNT: 47.9 FL (ref 35.9–50)
ERYTHROCYTE [DISTWIDTH] IN BLOOD BY AUTOMATED COUNT: 48 FL (ref 35.9–50)
ERYTHROCYTE [DISTWIDTH] IN BLOOD BY AUTOMATED COUNT: 48.1 FL (ref 35.9–50)
ERYTHROCYTE [DISTWIDTH] IN BLOOD BY AUTOMATED COUNT: 48.5 FL (ref 35.9–50)
ERYTHROCYTE [DISTWIDTH] IN BLOOD BY AUTOMATED COUNT: 48.6 FL (ref 35.9–50)
ERYTHROCYTE [DISTWIDTH] IN BLOOD BY AUTOMATED COUNT: 48.7 FL (ref 35.9–50)
ERYTHROCYTE [DISTWIDTH] IN BLOOD BY AUTOMATED COUNT: 48.8 FL (ref 35.9–50)
ERYTHROCYTE [DISTWIDTH] IN BLOOD BY AUTOMATED COUNT: 48.8 FL (ref 35.9–50)
ERYTHROCYTE [DISTWIDTH] IN BLOOD BY AUTOMATED COUNT: 49.1 FL (ref 35.9–50)
ERYTHROCYTE [DISTWIDTH] IN BLOOD BY AUTOMATED COUNT: 49.2 FL (ref 35.9–50)
ERYTHROCYTE [DISTWIDTH] IN BLOOD BY AUTOMATED COUNT: 49.2 FL (ref 35.9–50)
ERYTHROCYTE [DISTWIDTH] IN BLOOD BY AUTOMATED COUNT: 49.6 FL (ref 35.9–50)
ERYTHROCYTE [DISTWIDTH] IN BLOOD BY AUTOMATED COUNT: 49.6 FL (ref 35.9–50)
ERYTHROCYTE [DISTWIDTH] IN BLOOD BY AUTOMATED COUNT: 49.7 FL (ref 35.9–50)
ERYTHROCYTE [DISTWIDTH] IN BLOOD BY AUTOMATED COUNT: 49.8 FL (ref 35.9–50)
ERYTHROCYTE [DISTWIDTH] IN BLOOD BY AUTOMATED COUNT: 50 FL (ref 35.9–50)
ERYTHROCYTE [DISTWIDTH] IN BLOOD BY AUTOMATED COUNT: 50 FL (ref 35.9–50)
ERYTHROCYTE [DISTWIDTH] IN BLOOD BY AUTOMATED COUNT: 50.2 FL (ref 35.9–50)
ERYTHROCYTE [DISTWIDTH] IN BLOOD BY AUTOMATED COUNT: 50.2 FL (ref 35.9–50)
ERYTHROCYTE [DISTWIDTH] IN BLOOD BY AUTOMATED COUNT: 50.4 FL (ref 35.9–50)
ERYTHROCYTE [DISTWIDTH] IN BLOOD BY AUTOMATED COUNT: 50.4 FL (ref 35.9–50)
ERYTHROCYTE [DISTWIDTH] IN BLOOD BY AUTOMATED COUNT: 50.6 FL (ref 35.9–50)
ERYTHROCYTE [DISTWIDTH] IN BLOOD BY AUTOMATED COUNT: 50.8 FL (ref 35.9–50)
ERYTHROCYTE [DISTWIDTH] IN BLOOD BY AUTOMATED COUNT: 51 FL (ref 35.9–50)
ERYTHROCYTE [DISTWIDTH] IN BLOOD BY AUTOMATED COUNT: 51.3 FL (ref 35.9–50)
ERYTHROCYTE [DISTWIDTH] IN BLOOD BY AUTOMATED COUNT: 51.4 FL (ref 35.9–50)
ERYTHROCYTE [DISTWIDTH] IN BLOOD BY AUTOMATED COUNT: 51.4 FL (ref 35.9–50)
ERYTHROCYTE [DISTWIDTH] IN BLOOD BY AUTOMATED COUNT: 51.6 FL (ref 35.9–50)
ERYTHROCYTE [DISTWIDTH] IN BLOOD BY AUTOMATED COUNT: 51.6 FL (ref 35.9–50)
ERYTHROCYTE [DISTWIDTH] IN BLOOD BY AUTOMATED COUNT: 51.7 FL (ref 35.9–50)
ERYTHROCYTE [DISTWIDTH] IN BLOOD BY AUTOMATED COUNT: 51.8 FL (ref 35.9–50)
ERYTHROCYTE [DISTWIDTH] IN BLOOD BY AUTOMATED COUNT: 52.1 FL (ref 35.9–50)
ERYTHROCYTE [DISTWIDTH] IN BLOOD BY AUTOMATED COUNT: 52.3 FL (ref 35.9–50)
ERYTHROCYTE [DISTWIDTH] IN BLOOD BY AUTOMATED COUNT: 52.4 FL (ref 35.9–50)
ERYTHROCYTE [DISTWIDTH] IN BLOOD BY AUTOMATED COUNT: 52.4 FL (ref 35.9–50)
ERYTHROCYTE [DISTWIDTH] IN BLOOD BY AUTOMATED COUNT: 52.5 FL (ref 35.9–50)
ERYTHROCYTE [DISTWIDTH] IN BLOOD BY AUTOMATED COUNT: 52.6 FL (ref 35.9–50)
ERYTHROCYTE [DISTWIDTH] IN BLOOD BY AUTOMATED COUNT: 53 FL (ref 35.9–50)
ERYTHROCYTE [DISTWIDTH] IN BLOOD BY AUTOMATED COUNT: 53 FL (ref 35.9–50)
ERYTHROCYTE [DISTWIDTH] IN BLOOD BY AUTOMATED COUNT: 53.1 FL (ref 35.9–50)
ERYTHROCYTE [DISTWIDTH] IN BLOOD BY AUTOMATED COUNT: 53.2 FL (ref 35.9–50)
ERYTHROCYTE [DISTWIDTH] IN BLOOD BY AUTOMATED COUNT: 53.6 FL (ref 35.9–50)
ERYTHROCYTE [DISTWIDTH] IN BLOOD BY AUTOMATED COUNT: 54.5 FL (ref 35.9–50)
ERYTHROCYTE [DISTWIDTH] IN BLOOD BY AUTOMATED COUNT: 54.5 FL (ref 35.9–50)
ERYTHROCYTE [DISTWIDTH] IN BLOOD BY AUTOMATED COUNT: 55.1 FL (ref 35.9–50)
ERYTHROCYTE [DISTWIDTH] IN BLOOD BY AUTOMATED COUNT: 55.1 FL (ref 35.9–50)
ERYTHROCYTE [DISTWIDTH] IN BLOOD BY AUTOMATED COUNT: 55.2 FL (ref 35.9–50)
ERYTHROCYTE [DISTWIDTH] IN BLOOD BY AUTOMATED COUNT: 56 FL (ref 35.9–50)
ERYTHROCYTE [DISTWIDTH] IN BLOOD BY AUTOMATED COUNT: 56.4 FL (ref 35.9–50)
ERYTHROCYTE [DISTWIDTH] IN BLOOD BY AUTOMATED COUNT: 57.1 FL (ref 35.9–50)
ERYTHROCYTE [DISTWIDTH] IN BLOOD BY AUTOMATED COUNT: 57.4 FL (ref 35.9–50)
ERYTHROCYTE [DISTWIDTH] IN BLOOD BY AUTOMATED COUNT: 57.5 FL (ref 35.9–50)
ERYTHROCYTE [DISTWIDTH] IN BLOOD BY AUTOMATED COUNT: 57.6 FL (ref 35.9–50)
ERYTHROCYTE [DISTWIDTH] IN BLOOD BY AUTOMATED COUNT: 57.9 FL (ref 35.9–50)
ERYTHROCYTE [DISTWIDTH] IN BLOOD BY AUTOMATED COUNT: 58 FL (ref 35.9–50)
ERYTHROCYTE [DISTWIDTH] IN BLOOD BY AUTOMATED COUNT: 58.1 FL (ref 35.9–50)
ERYTHROCYTE [DISTWIDTH] IN BLOOD BY AUTOMATED COUNT: 58.4 FL (ref 35.9–50)
ERYTHROCYTE [DISTWIDTH] IN BLOOD BY AUTOMATED COUNT: 58.5 FL (ref 35.9–50)
ERYTHROCYTE [DISTWIDTH] IN BLOOD BY AUTOMATED COUNT: 59.4 FL (ref 35.9–50)
ERYTHROCYTE [DISTWIDTH] IN BLOOD BY AUTOMATED COUNT: 59.7 FL (ref 35.9–50)
ERYTHROCYTE [SEDIMENTATION RATE] IN BLOOD BY WESTERGREN METHOD: 113 MM/HOUR (ref 0–20)
ERYTHROCYTE [SEDIMENTATION RATE] IN BLOOD BY WESTERGREN METHOD: 20 MM/HOUR (ref 0–20)
ERYTHROCYTE [SEDIMENTATION RATE] IN BLOOD BY WESTERGREN METHOD: 40 MM/HOUR (ref 0–20)
ERYTHROCYTE [SEDIMENTATION RATE] IN BLOOD BY WESTERGREN METHOD: 41 MM/HOUR (ref 0–20)
ERYTHROCYTE [SEDIMENTATION RATE] IN BLOOD BY WESTERGREN METHOD: 96 MM/HOUR (ref 0–20)
EST. AVERAGE GLUCOSE BLD GHB EST-MCNC: 88 MG/DL
EST. AVERAGE GLUCOSE BLD GHB EST-MCNC: 91 MG/DL
EST. AVERAGE GLUCOSE BLD GHB EST-MCNC: 94 MG/DL
EST. AVERAGE GLUCOSE BLD GHB EST-MCNC: 97 MG/DL
ETEST SENSITIVITY ETEST: NORMAL
ETHANOL BLD-MCNC: <10.1 MG/DL
ETHANOL BLD-MCNC: <10.1 MG/DL
FENTANYL UR QL: NEGATIVE
FERRITIN SERPL-MCNC: 289 NG/ML (ref 22–322)
FLUAV RNA SPEC QL NAA+PROBE: NEGATIVE
FLUBV RNA SPEC QL NAA+PROBE: NEGATIVE
FUNGUS SPEC CULT: NORMAL
FUNGUS SPEC CULT: NORMAL
FUNGUS SPEC FUNGUS STN: NORMAL
GFR SERPLBLD CREATININE-BSD FMLA CKD-EPI: 102 ML/MIN/1.73 M 2
GFR SERPLBLD CREATININE-BSD FMLA CKD-EPI: 102 ML/MIN/1.73 M 2
GFR SERPLBLD CREATININE-BSD FMLA CKD-EPI: 104 ML/MIN/1.73 M 2
GFR SERPLBLD CREATININE-BSD FMLA CKD-EPI: 104 ML/MIN/1.73 M 2
GFR SERPLBLD CREATININE-BSD FMLA CKD-EPI: 106 ML/MIN/1.73 M 2
GFR SERPLBLD CREATININE-BSD FMLA CKD-EPI: 107 ML/MIN/1.73 M 2
GFR SERPLBLD CREATININE-BSD FMLA CKD-EPI: 108 ML/MIN/1.73 M 2
GFR SERPLBLD CREATININE-BSD FMLA CKD-EPI: 109 ML/MIN/1.73 M 2
GFR SERPLBLD CREATININE-BSD FMLA CKD-EPI: 110 ML/MIN/1.73 M 2
GFR SERPLBLD CREATININE-BSD FMLA CKD-EPI: 110 ML/MIN/1.73 M 2
GFR SERPLBLD CREATININE-BSD FMLA CKD-EPI: 111 ML/MIN/1.73 M 2
GFR SERPLBLD CREATININE-BSD FMLA CKD-EPI: 112 ML/MIN/1.73 M 2
GFR SERPLBLD CREATININE-BSD FMLA CKD-EPI: 112 ML/MIN/1.73 M 2
GFR SERPLBLD CREATININE-BSD FMLA CKD-EPI: 113 ML/MIN/1.73 M 2
GFR SERPLBLD CREATININE-BSD FMLA CKD-EPI: 114 ML/MIN/1.73 M 2
GFR SERPLBLD CREATININE-BSD FMLA CKD-EPI: 115 ML/MIN/1.73 M 2
GFR SERPLBLD CREATININE-BSD FMLA CKD-EPI: 117 ML/MIN/1.73 M 2
GFR SERPLBLD CREATININE-BSD FMLA CKD-EPI: 117 ML/MIN/1.73 M 2
GFR SERPLBLD CREATININE-BSD FMLA CKD-EPI: 118 ML/MIN/1.73 M 2
GFR SERPLBLD CREATININE-BSD FMLA CKD-EPI: 119 ML/MIN/1.73 M 2
GFR SERPLBLD CREATININE-BSD FMLA CKD-EPI: 120 ML/MIN/1.73 M 2
GFR SERPLBLD CREATININE-BSD FMLA CKD-EPI: 121 ML/MIN/1.73 M 2
GFR SERPLBLD CREATININE-BSD FMLA CKD-EPI: 121 ML/MIN/1.73 M 2
GFR SERPLBLD CREATININE-BSD FMLA CKD-EPI: 122 ML/MIN/1.73 M 2
GFR SERPLBLD CREATININE-BSD FMLA CKD-EPI: 123 ML/MIN/1.73 M 2
GFR SERPLBLD CREATININE-BSD FMLA CKD-EPI: 124 ML/MIN/1.73 M 2
GFR SERPLBLD CREATININE-BSD FMLA CKD-EPI: 125 ML/MIN/1.73 M 2
GFR SERPLBLD CREATININE-BSD FMLA CKD-EPI: 125 ML/MIN/1.73 M 2
GFR SERPLBLD CREATININE-BSD FMLA CKD-EPI: 126 ML/MIN/1.73 M 2
GFR SERPLBLD CREATININE-BSD FMLA CKD-EPI: 127 ML/MIN/1.73 M 2
GFR SERPLBLD CREATININE-BSD FMLA CKD-EPI: 128 ML/MIN/1.73 M 2
GFR SERPLBLD CREATININE-BSD FMLA CKD-EPI: 130 ML/MIN/1.73 M 2
GFR SERPLBLD CREATININE-BSD FMLA CKD-EPI: 131 ML/MIN/1.73 M 2
GFR SERPLBLD CREATININE-BSD FMLA CKD-EPI: 132 ML/MIN/1.73 M 2
GFR SERPLBLD CREATININE-BSD FMLA CKD-EPI: 133 ML/MIN/1.73 M 2
GFR SERPLBLD CREATININE-BSD FMLA CKD-EPI: 134 ML/MIN/1.73 M 2
GFR SERPLBLD CREATININE-BSD FMLA CKD-EPI: 137 ML/MIN/1.73 M 2
GFR SERPLBLD CREATININE-BSD FMLA CKD-EPI: 141 ML/MIN/1.73 M 2
GFR SERPLBLD CREATININE-BSD FMLA CKD-EPI: 28 ML/MIN/1.73 M 2
GFR SERPLBLD CREATININE-BSD FMLA CKD-EPI: 29 ML/MIN/1.73 M 2
GFR SERPLBLD CREATININE-BSD FMLA CKD-EPI: 31 ML/MIN/1.73 M 2
GFR SERPLBLD CREATININE-BSD FMLA CKD-EPI: 32 ML/MIN/1.73 M 2
GFR SERPLBLD CREATININE-BSD FMLA CKD-EPI: 33 ML/MIN/1.73 M 2
GFR SERPLBLD CREATININE-BSD FMLA CKD-EPI: 33 ML/MIN/1.73 M 2
GFR SERPLBLD CREATININE-BSD FMLA CKD-EPI: 34 ML/MIN/1.73 M 2
GFR SERPLBLD CREATININE-BSD FMLA CKD-EPI: 38 ML/MIN/1.73 M 2
GFR SERPLBLD CREATININE-BSD FMLA CKD-EPI: 40 ML/MIN/1.73 M 2
GFR SERPLBLD CREATININE-BSD FMLA CKD-EPI: 41 ML/MIN/1.73 M 2
GFR SERPLBLD CREATININE-BSD FMLA CKD-EPI: 43 ML/MIN/1.73 M 2
GFR SERPLBLD CREATININE-BSD FMLA CKD-EPI: 43 ML/MIN/1.73 M 2
GFR SERPLBLD CREATININE-BSD FMLA CKD-EPI: 44 ML/MIN/1.73 M 2
GFR SERPLBLD CREATININE-BSD FMLA CKD-EPI: 44 ML/MIN/1.73 M 2
GFR SERPLBLD CREATININE-BSD FMLA CKD-EPI: 45 ML/MIN/1.73 M 2
GFR SERPLBLD CREATININE-BSD FMLA CKD-EPI: 46 ML/MIN/1.73 M 2
GFR SERPLBLD CREATININE-BSD FMLA CKD-EPI: 49 ML/MIN/1.73 M 2
GFR SERPLBLD CREATININE-BSD FMLA CKD-EPI: 50 ML/MIN/1.73 M 2
GFR SERPLBLD CREATININE-BSD FMLA CKD-EPI: 62 ML/MIN/1.73 M 2
GFR SERPLBLD CREATININE-BSD FMLA CKD-EPI: 63 ML/MIN/1.73 M 2
GFR SERPLBLD CREATININE-BSD FMLA CKD-EPI: 69 ML/MIN/1.73 M 2
GFR SERPLBLD CREATININE-BSD FMLA CKD-EPI: 81 ML/MIN/1.73 M 2
GFR SERPLBLD CREATININE-BSD FMLA CKD-EPI: 88 ML/MIN/1.73 M 2
GFR SERPLBLD CREATININE-BSD FMLA CKD-EPI: 90 ML/MIN/1.73 M 2
GFR SERPLBLD CREATININE-BSD FMLA CKD-EPI: 91 ML/MIN/1.73 M 2
GFR SERPLBLD CREATININE-BSD FMLA CKD-EPI: 91 ML/MIN/1.73 M 2
GFR SERPLBLD CREATININE-BSD FMLA CKD-EPI: 98 ML/MIN/1.73 M 2
GLOBULIN SER CALC-MCNC: 2.9 G/DL (ref 1.9–3.5)
GLOBULIN SER CALC-MCNC: 3 G/DL (ref 1.9–3.5)
GLOBULIN SER CALC-MCNC: 3 G/DL (ref 1.9–3.5)
GLOBULIN SER CALC-MCNC: 3.1 G/DL (ref 1.9–3.5)
GLOBULIN SER CALC-MCNC: 3.2 G/DL (ref 1.9–3.5)
GLOBULIN SER CALC-MCNC: 3.3 G/DL (ref 1.9–3.5)
GLOBULIN SER CALC-MCNC: 3.4 G/DL (ref 1.9–3.5)
GLOBULIN SER CALC-MCNC: 3.5 G/DL (ref 1.9–3.5)
GLOBULIN SER CALC-MCNC: 3.6 G/DL (ref 1.9–3.5)
GLOBULIN SER CALC-MCNC: 3.6 G/DL (ref 1.9–3.5)
GLOBULIN SER CALC-MCNC: 3.7 G/DL (ref 1.9–3.5)
GLOBULIN SER CALC-MCNC: 3.8 G/DL (ref 1.9–3.5)
GLOBULIN SER CALC-MCNC: 3.9 G/DL (ref 1.9–3.5)
GLOBULIN SER CALC-MCNC: 4 G/DL (ref 1.9–3.5)
GLOBULIN SER CALC-MCNC: 4.1 G/DL (ref 1.9–3.5)
GLOBULIN SER CALC-MCNC: 4.2 G/DL (ref 1.9–3.5)
GLOBULIN SER CALC-MCNC: 4.5 G/DL (ref 1.9–3.5)
GLOBULIN SER CALC-MCNC: 4.9 G/DL (ref 1.9–3.5)
GLOBULIN SER CALC-MCNC: 5.6 G/DL (ref 1.9–3.5)
GLUCOSE BLD STRIP.AUTO-MCNC: 100 MG/DL (ref 65–99)
GLUCOSE BLD STRIP.AUTO-MCNC: 101 MG/DL (ref 65–99)
GLUCOSE BLD STRIP.AUTO-MCNC: 101 MG/DL (ref 65–99)
GLUCOSE BLD STRIP.AUTO-MCNC: 103 MG/DL (ref 65–99)
GLUCOSE BLD STRIP.AUTO-MCNC: 105 MG/DL (ref 65–99)
GLUCOSE BLD STRIP.AUTO-MCNC: 106 MG/DL (ref 65–99)
GLUCOSE BLD STRIP.AUTO-MCNC: 107 MG/DL (ref 65–99)
GLUCOSE BLD STRIP.AUTO-MCNC: 108 MG/DL (ref 65–99)
GLUCOSE BLD STRIP.AUTO-MCNC: 109 MG/DL (ref 65–99)
GLUCOSE BLD STRIP.AUTO-MCNC: 109 MG/DL (ref 65–99)
GLUCOSE BLD STRIP.AUTO-MCNC: 110 MG/DL (ref 65–99)
GLUCOSE BLD STRIP.AUTO-MCNC: 110 MG/DL (ref 65–99)
GLUCOSE BLD STRIP.AUTO-MCNC: 111 MG/DL (ref 65–99)
GLUCOSE BLD STRIP.AUTO-MCNC: 112 MG/DL (ref 65–99)
GLUCOSE BLD STRIP.AUTO-MCNC: 112 MG/DL (ref 65–99)
GLUCOSE BLD STRIP.AUTO-MCNC: 113 MG/DL (ref 65–99)
GLUCOSE BLD STRIP.AUTO-MCNC: 114 MG/DL (ref 65–99)
GLUCOSE BLD STRIP.AUTO-MCNC: 114 MG/DL (ref 65–99)
GLUCOSE BLD STRIP.AUTO-MCNC: 116 MG/DL (ref 65–99)
GLUCOSE BLD STRIP.AUTO-MCNC: 116 MG/DL (ref 65–99)
GLUCOSE BLD STRIP.AUTO-MCNC: 118 MG/DL (ref 65–99)
GLUCOSE BLD STRIP.AUTO-MCNC: 127 MG/DL (ref 65–99)
GLUCOSE BLD STRIP.AUTO-MCNC: 137 MG/DL (ref 65–99)
GLUCOSE BLD STRIP.AUTO-MCNC: 163 MG/DL (ref 65–99)
GLUCOSE BLD STRIP.AUTO-MCNC: 172 MG/DL (ref 65–99)
GLUCOSE BLD STRIP.AUTO-MCNC: 46 MG/DL (ref 65–99)
GLUCOSE BLD STRIP.AUTO-MCNC: 48 MG/DL (ref 65–99)
GLUCOSE BLD STRIP.AUTO-MCNC: 58 MG/DL (ref 65–99)
GLUCOSE BLD STRIP.AUTO-MCNC: 58 MG/DL (ref 65–99)
GLUCOSE BLD STRIP.AUTO-MCNC: 59 MG/DL (ref 65–99)
GLUCOSE BLD STRIP.AUTO-MCNC: 59 MG/DL (ref 65–99)
GLUCOSE BLD STRIP.AUTO-MCNC: 60 MG/DL (ref 65–99)
GLUCOSE BLD STRIP.AUTO-MCNC: 60 MG/DL (ref 65–99)
GLUCOSE BLD STRIP.AUTO-MCNC: 62 MG/DL (ref 65–99)
GLUCOSE BLD STRIP.AUTO-MCNC: 63 MG/DL (ref 65–99)
GLUCOSE BLD STRIP.AUTO-MCNC: 63 MG/DL (ref 65–99)
GLUCOSE BLD STRIP.AUTO-MCNC: 64 MG/DL (ref 65–99)
GLUCOSE BLD STRIP.AUTO-MCNC: 65 MG/DL (ref 65–99)
GLUCOSE BLD STRIP.AUTO-MCNC: 66 MG/DL (ref 65–99)
GLUCOSE BLD STRIP.AUTO-MCNC: 66 MG/DL (ref 65–99)
GLUCOSE BLD STRIP.AUTO-MCNC: 67 MG/DL (ref 65–99)
GLUCOSE BLD STRIP.AUTO-MCNC: 67 MG/DL (ref 65–99)
GLUCOSE BLD STRIP.AUTO-MCNC: 68 MG/DL (ref 65–99)
GLUCOSE BLD STRIP.AUTO-MCNC: 69 MG/DL (ref 65–99)
GLUCOSE BLD STRIP.AUTO-MCNC: 70 MG/DL (ref 65–99)
GLUCOSE BLD STRIP.AUTO-MCNC: 71 MG/DL (ref 65–99)
GLUCOSE BLD STRIP.AUTO-MCNC: 72 MG/DL (ref 65–99)
GLUCOSE BLD STRIP.AUTO-MCNC: 73 MG/DL (ref 65–99)
GLUCOSE BLD STRIP.AUTO-MCNC: 74 MG/DL (ref 65–99)
GLUCOSE BLD STRIP.AUTO-MCNC: 74 MG/DL (ref 65–99)
GLUCOSE BLD STRIP.AUTO-MCNC: 75 MG/DL (ref 65–99)
GLUCOSE BLD STRIP.AUTO-MCNC: 76 MG/DL (ref 65–99)
GLUCOSE BLD STRIP.AUTO-MCNC: 77 MG/DL (ref 65–99)
GLUCOSE BLD STRIP.AUTO-MCNC: 78 MG/DL (ref 65–99)
GLUCOSE BLD STRIP.AUTO-MCNC: 79 MG/DL (ref 65–99)
GLUCOSE BLD STRIP.AUTO-MCNC: 79 MG/DL (ref 65–99)
GLUCOSE BLD STRIP.AUTO-MCNC: 80 MG/DL (ref 65–99)
GLUCOSE BLD STRIP.AUTO-MCNC: 80 MG/DL (ref 65–99)
GLUCOSE BLD STRIP.AUTO-MCNC: 81 MG/DL (ref 65–99)
GLUCOSE BLD STRIP.AUTO-MCNC: 82 MG/DL (ref 65–99)
GLUCOSE BLD STRIP.AUTO-MCNC: 83 MG/DL (ref 65–99)
GLUCOSE BLD STRIP.AUTO-MCNC: 84 MG/DL (ref 65–99)
GLUCOSE BLD STRIP.AUTO-MCNC: 85 MG/DL (ref 65–99)
GLUCOSE BLD STRIP.AUTO-MCNC: 86 MG/DL (ref 65–99)
GLUCOSE BLD STRIP.AUTO-MCNC: 86 MG/DL (ref 65–99)
GLUCOSE BLD STRIP.AUTO-MCNC: 87 MG/DL (ref 65–99)
GLUCOSE BLD STRIP.AUTO-MCNC: 88 MG/DL (ref 65–99)
GLUCOSE BLD STRIP.AUTO-MCNC: 89 MG/DL (ref 65–99)
GLUCOSE BLD STRIP.AUTO-MCNC: 89 MG/DL (ref 65–99)
GLUCOSE BLD STRIP.AUTO-MCNC: 90 MG/DL (ref 65–99)
GLUCOSE BLD STRIP.AUTO-MCNC: 91 MG/DL (ref 65–99)
GLUCOSE BLD STRIP.AUTO-MCNC: 92 MG/DL (ref 65–99)
GLUCOSE BLD STRIP.AUTO-MCNC: 93 MG/DL (ref 65–99)
GLUCOSE BLD STRIP.AUTO-MCNC: 94 MG/DL (ref 65–99)
GLUCOSE BLD STRIP.AUTO-MCNC: 94 MG/DL (ref 65–99)
GLUCOSE BLD STRIP.AUTO-MCNC: 95 MG/DL (ref 65–99)
GLUCOSE BLD STRIP.AUTO-MCNC: 95 MG/DL (ref 65–99)
GLUCOSE BLD STRIP.AUTO-MCNC: 96 MG/DL (ref 65–99)
GLUCOSE BLD STRIP.AUTO-MCNC: 96 MG/DL (ref 65–99)
GLUCOSE BLD STRIP.AUTO-MCNC: 97 MG/DL (ref 65–99)
GLUCOSE BLD STRIP.AUTO-MCNC: 99 MG/DL (ref 65–99)
GLUCOSE SERPL-MCNC: 100 MG/DL (ref 65–99)
GLUCOSE SERPL-MCNC: 103 MG/DL (ref 65–99)
GLUCOSE SERPL-MCNC: 104 MG/DL (ref 65–99)
GLUCOSE SERPL-MCNC: 105 MG/DL (ref 65–99)
GLUCOSE SERPL-MCNC: 106 MG/DL (ref 65–99)
GLUCOSE SERPL-MCNC: 108 MG/DL (ref 65–99)
GLUCOSE SERPL-MCNC: 109 MG/DL (ref 65–99)
GLUCOSE SERPL-MCNC: 109 MG/DL (ref 65–99)
GLUCOSE SERPL-MCNC: 111 MG/DL (ref 65–99)
GLUCOSE SERPL-MCNC: 114 MG/DL (ref 65–99)
GLUCOSE SERPL-MCNC: 117 MG/DL (ref 65–99)
GLUCOSE SERPL-MCNC: 118 MG/DL (ref 65–99)
GLUCOSE SERPL-MCNC: 123 MG/DL (ref 65–99)
GLUCOSE SERPL-MCNC: 126 MG/DL (ref 65–99)
GLUCOSE SERPL-MCNC: 130 MG/DL (ref 65–99)
GLUCOSE SERPL-MCNC: 132 MG/DL (ref 65–99)
GLUCOSE SERPL-MCNC: 134 MG/DL (ref 65–99)
GLUCOSE SERPL-MCNC: 138 MG/DL (ref 65–99)
GLUCOSE SERPL-MCNC: 152 MG/DL (ref 65–99)
GLUCOSE SERPL-MCNC: 156 MG/DL (ref 65–99)
GLUCOSE SERPL-MCNC: 55 MG/DL (ref 65–99)
GLUCOSE SERPL-MCNC: 59 MG/DL (ref 65–99)
GLUCOSE SERPL-MCNC: 65 MG/DL (ref 65–99)
GLUCOSE SERPL-MCNC: 68 MG/DL (ref 65–99)
GLUCOSE SERPL-MCNC: 70 MG/DL (ref 65–99)
GLUCOSE SERPL-MCNC: 71 MG/DL (ref 65–99)
GLUCOSE SERPL-MCNC: 73 MG/DL (ref 65–99)
GLUCOSE SERPL-MCNC: 74 MG/DL (ref 65–99)
GLUCOSE SERPL-MCNC: 76 MG/DL (ref 65–99)
GLUCOSE SERPL-MCNC: 77 MG/DL (ref 65–99)
GLUCOSE SERPL-MCNC: 78 MG/DL (ref 65–99)
GLUCOSE SERPL-MCNC: 79 MG/DL (ref 65–99)
GLUCOSE SERPL-MCNC: 80 MG/DL (ref 65–99)
GLUCOSE SERPL-MCNC: 80 MG/DL (ref 65–99)
GLUCOSE SERPL-MCNC: 81 MG/DL (ref 65–99)
GLUCOSE SERPL-MCNC: 82 MG/DL (ref 65–99)
GLUCOSE SERPL-MCNC: 83 MG/DL (ref 65–99)
GLUCOSE SERPL-MCNC: 84 MG/DL (ref 65–99)
GLUCOSE SERPL-MCNC: 84 MG/DL (ref 65–99)
GLUCOSE SERPL-MCNC: 85 MG/DL (ref 65–99)
GLUCOSE SERPL-MCNC: 86 MG/DL (ref 65–99)
GLUCOSE SERPL-MCNC: 87 MG/DL (ref 65–99)
GLUCOSE SERPL-MCNC: 88 MG/DL (ref 65–99)
GLUCOSE SERPL-MCNC: 89 MG/DL (ref 65–99)
GLUCOSE SERPL-MCNC: 90 MG/DL (ref 65–99)
GLUCOSE SERPL-MCNC: 91 MG/DL (ref 65–99)
GLUCOSE SERPL-MCNC: 92 MG/DL (ref 65–99)
GLUCOSE SERPL-MCNC: 93 MG/DL (ref 65–99)
GLUCOSE SERPL-MCNC: 94 MG/DL (ref 65–99)
GLUCOSE SERPL-MCNC: 95 MG/DL (ref 65–99)
GLUCOSE SERPL-MCNC: 96 MG/DL (ref 65–99)
GLUCOSE SERPL-MCNC: 98 MG/DL (ref 65–99)
GLUCOSE SERPL-MCNC: 99 MG/DL (ref 65–99)
GLUCOSE UR STRIP.AUTO-MCNC: NEGATIVE MG/DL
GRAM STN SPEC: ABNORMAL
GRAM STN SPEC: NORMAL
GRAN CASTS #/AREA URNS LPF: ABNORMAL /LPF
HAV IGM SERPL QL IA: NORMAL
HAV IGM SERPL QL IA: NORMAL
HBA1C MFR BLD: 4.7 % (ref 4–5.6)
HBA1C MFR BLD: 4.8 % (ref 4–5.6)
HBA1C MFR BLD: 4.9 % (ref 4–5.6)
HBA1C MFR BLD: 5 % (ref 4–5.6)
HBV CORE IGM SER QL: NORMAL
HBV CORE IGM SER QL: NORMAL
HBV SURFACE AG SER QL: NORMAL
HBV SURFACE AG SER QL: NORMAL
HCO3 BLDA-SCNC: 25 MMOL/L (ref 17–25)
HCO3 BLDV-SCNC: 20 MMOL/L (ref 24–28)
HCO3 BLDV-SCNC: 22 MMOL/L (ref 24–28)
HCT VFR BLD AUTO: 24.8 % (ref 42–52)
HCT VFR BLD AUTO: 24.9 % (ref 42–52)
HCT VFR BLD AUTO: 25.4 % (ref 42–52)
HCT VFR BLD AUTO: 25.5 % (ref 42–52)
HCT VFR BLD AUTO: 25.8 % (ref 42–52)
HCT VFR BLD AUTO: 26.1 % (ref 42–52)
HCT VFR BLD AUTO: 26.5 % (ref 42–52)
HCT VFR BLD AUTO: 26.8 % (ref 42–52)
HCT VFR BLD AUTO: 27.3 % (ref 42–52)
HCT VFR BLD AUTO: 27.5 % (ref 42–52)
HCT VFR BLD AUTO: 27.6 % (ref 42–52)
HCT VFR BLD AUTO: 27.9 % (ref 42–52)
HCT VFR BLD AUTO: 28 % (ref 42–52)
HCT VFR BLD AUTO: 28.1 % (ref 42–52)
HCT VFR BLD AUTO: 28.2 % (ref 42–52)
HCT VFR BLD AUTO: 28.3 % (ref 42–52)
HCT VFR BLD AUTO: 28.4 % (ref 42–52)
HCT VFR BLD AUTO: 28.4 % (ref 42–52)
HCT VFR BLD AUTO: 28.7 % (ref 42–52)
HCT VFR BLD AUTO: 28.9 % (ref 42–52)
HCT VFR BLD AUTO: 28.9 % (ref 42–52)
HCT VFR BLD AUTO: 29 % (ref 42–52)
HCT VFR BLD AUTO: 29.2 % (ref 42–52)
HCT VFR BLD AUTO: 29.3 % (ref 42–52)
HCT VFR BLD AUTO: 29.4 % (ref 42–52)
HCT VFR BLD AUTO: 29.5 % (ref 42–52)
HCT VFR BLD AUTO: 29.7 % (ref 42–52)
HCT VFR BLD AUTO: 29.8 % (ref 42–52)
HCT VFR BLD AUTO: 29.8 % (ref 42–52)
HCT VFR BLD AUTO: 30 % (ref 42–52)
HCT VFR BLD AUTO: 30.1 % (ref 42–52)
HCT VFR BLD AUTO: 30.1 % (ref 42–52)
HCT VFR BLD AUTO: 30.2 % (ref 42–52)
HCT VFR BLD AUTO: 30.3 % (ref 42–52)
HCT VFR BLD AUTO: 30.3 % (ref 42–52)
HCT VFR BLD AUTO: 30.4 % (ref 42–52)
HCT VFR BLD AUTO: 30.5 % (ref 42–52)
HCT VFR BLD AUTO: 30.8 % (ref 42–52)
HCT VFR BLD AUTO: 31 % (ref 42–52)
HCT VFR BLD AUTO: 31.3 % (ref 42–52)
HCT VFR BLD AUTO: 31.3 % (ref 42–52)
HCT VFR BLD AUTO: 31.4 % (ref 42–52)
HCT VFR BLD AUTO: 31.5 % (ref 42–52)
HCT VFR BLD AUTO: 31.8 % (ref 42–52)
HCT VFR BLD AUTO: 31.8 % (ref 42–52)
HCT VFR BLD AUTO: 32 % (ref 42–52)
HCT VFR BLD AUTO: 32.1 % (ref 42–52)
HCT VFR BLD AUTO: 32.2 % (ref 42–52)
HCT VFR BLD AUTO: 32.2 % (ref 42–52)
HCT VFR BLD AUTO: 32.6 % (ref 42–52)
HCT VFR BLD AUTO: 33 % (ref 42–52)
HCT VFR BLD AUTO: 33.2 % (ref 42–52)
HCT VFR BLD AUTO: 33.4 % (ref 42–52)
HCT VFR BLD AUTO: 33.7 % (ref 42–52)
HCT VFR BLD AUTO: 34.3 % (ref 42–52)
HCT VFR BLD AUTO: 34.9 % (ref 42–52)
HCT VFR BLD AUTO: 35.1 % (ref 42–52)
HCT VFR BLD AUTO: 35.1 % (ref 42–52)
HCT VFR BLD AUTO: 36.2 % (ref 42–52)
HCT VFR BLD AUTO: 36.2 % (ref 42–52)
HCT VFR BLD AUTO: 36.8 % (ref 42–52)
HCT VFR BLD AUTO: 39.6 % (ref 42–52)
HCV AB SER QL: NORMAL
HCV AB SER QL: NORMAL
HDLC SERPL-MCNC: 32 MG/DL
HEMOCCULT SP1 STL QL: NEGATIVE
HEMOCCULT SP1 STL QL: NEGATIVE
HGB BLD-MCNC: 10.1 G/DL (ref 14–18)
HGB BLD-MCNC: 10.3 G/DL (ref 14–18)
HGB BLD-MCNC: 10.3 G/DL (ref 14–18)
HGB BLD-MCNC: 10.4 G/DL (ref 14–18)
HGB BLD-MCNC: 10.4 G/DL (ref 14–18)
HGB BLD-MCNC: 10.5 G/DL (ref 14–18)
HGB BLD-MCNC: 10.7 G/DL (ref 14–18)
HGB BLD-MCNC: 11.2 G/DL (ref 14–18)
HGB BLD-MCNC: 11.2 G/DL (ref 14–18)
HGB BLD-MCNC: 11.3 G/DL (ref 14–18)
HGB BLD-MCNC: 11.4 G/DL (ref 14–18)
HGB BLD-MCNC: 11.4 G/DL (ref 14–18)
HGB BLD-MCNC: 11.5 G/DL (ref 14–18)
HGB BLD-MCNC: 11.8 G/DL (ref 14–18)
HGB BLD-MCNC: 12.1 G/DL (ref 14–18)
HGB BLD-MCNC: 13.1 G/DL (ref 14–18)
HGB BLD-MCNC: 7.3 G/DL (ref 14–18)
HGB BLD-MCNC: 7.6 G/DL (ref 14–18)
HGB BLD-MCNC: 7.7 G/DL (ref 14–18)
HGB BLD-MCNC: 7.7 G/DL (ref 14–18)
HGB BLD-MCNC: 7.8 G/DL (ref 14–18)
HGB BLD-MCNC: 8 G/DL (ref 14–18)
HGB BLD-MCNC: 8.2 G/DL (ref 14–18)
HGB BLD-MCNC: 8.2 G/DL (ref 14–18)
HGB BLD-MCNC: 8.3 G/DL (ref 14–18)
HGB BLD-MCNC: 8.3 G/DL (ref 14–18)
HGB BLD-MCNC: 8.4 G/DL (ref 14–18)
HGB BLD-MCNC: 8.4 G/DL (ref 14–18)
HGB BLD-MCNC: 8.6 G/DL (ref 14–18)
HGB BLD-MCNC: 8.6 G/DL (ref 14–18)
HGB BLD-MCNC: 8.7 G/DL (ref 14–18)
HGB BLD-MCNC: 8.7 G/DL (ref 14–18)
HGB BLD-MCNC: 8.8 G/DL (ref 14–18)
HGB BLD-MCNC: 8.9 G/DL (ref 14–18)
HGB BLD-MCNC: 8.9 G/DL (ref 14–18)
HGB BLD-MCNC: 9 G/DL (ref 14–18)
HGB BLD-MCNC: 9.1 G/DL (ref 14–18)
HGB BLD-MCNC: 9.2 G/DL (ref 14–18)
HGB BLD-MCNC: 9.3 G/DL (ref 14–18)
HGB BLD-MCNC: 9.4 G/DL (ref 14–18)
HGB BLD-MCNC: 9.5 G/DL (ref 14–18)
HGB BLD-MCNC: 9.5 G/DL (ref 14–18)
HGB BLD-MCNC: 9.7 G/DL (ref 14–18)
HGB BLD-MCNC: 9.8 G/DL (ref 14–18)
HGB BLD-MCNC: 9.9 G/DL (ref 14–18)
HGB RETIC QN AUTO: 29.1 PG/CELL (ref 29–35)
HGB RETIC QN AUTO: 30.5 PG/CELL (ref 29–35)
HIV 1+2 AB+HIV1 P24 AG SERPL QL IA: NORMAL
HOWELL-JOLLY BOD BLD QL SMEAR: NORMAL
HYALINE CASTS #/AREA URNS LPF: ABNORMAL /LPF
IGG SERPL-MCNC: 1514 MG/DL (ref 768–1632)
IMM GRANULOCYTES # BLD AUTO: 0.01 K/UL (ref 0–0.11)
IMM GRANULOCYTES # BLD AUTO: 0.02 K/UL (ref 0–0.11)
IMM GRANULOCYTES # BLD AUTO: 0.03 K/UL (ref 0–0.11)
IMM GRANULOCYTES # BLD AUTO: 0.04 K/UL (ref 0–0.11)
IMM GRANULOCYTES # BLD AUTO: 0.05 K/UL (ref 0–0.11)
IMM GRANULOCYTES # BLD AUTO: 0.06 K/UL (ref 0–0.11)
IMM GRANULOCYTES # BLD AUTO: 0.07 K/UL (ref 0–0.11)
IMM GRANULOCYTES # BLD AUTO: 0.08 K/UL (ref 0–0.11)
IMM GRANULOCYTES # BLD AUTO: 0.1 K/UL (ref 0–0.11)
IMM GRANULOCYTES # BLD AUTO: 0.11 K/UL (ref 0–0.11)
IMM GRANULOCYTES # BLD AUTO: 0.11 K/UL (ref 0–0.11)
IMM GRANULOCYTES # BLD AUTO: 0.12 K/UL (ref 0–0.11)
IMM GRANULOCYTES # BLD AUTO: 0.12 K/UL (ref 0–0.11)
IMM GRANULOCYTES # BLD AUTO: 0.16 K/UL (ref 0–0.11)
IMM GRANULOCYTES # BLD AUTO: 0.17 K/UL (ref 0–0.11)
IMM GRANULOCYTES # BLD AUTO: 0.26 K/UL (ref 0–0.11)
IMM GRANULOCYTES # BLD AUTO: 0.34 K/UL (ref 0–0.11)
IMM GRANULOCYTES # BLD AUTO: 0.51 K/UL (ref 0–0.11)
IMM GRANULOCYTES # BLD AUTO: 0.65 K/UL (ref 0–0.11)
IMM GRANULOCYTES # BLD AUTO: 0.65 K/UL (ref 0–0.11)
IMM GRANULOCYTES NFR BLD AUTO: 0.2 % (ref 0–0.9)
IMM GRANULOCYTES NFR BLD AUTO: 0.3 % (ref 0–0.9)
IMM GRANULOCYTES NFR BLD AUTO: 0.4 % (ref 0–0.9)
IMM GRANULOCYTES NFR BLD AUTO: 0.5 % (ref 0–0.9)
IMM GRANULOCYTES NFR BLD AUTO: 0.6 % (ref 0–0.9)
IMM GRANULOCYTES NFR BLD AUTO: 0.7 % (ref 0–0.9)
IMM GRANULOCYTES NFR BLD AUTO: 0.8 % (ref 0–0.9)
IMM GRANULOCYTES NFR BLD AUTO: 0.8 % (ref 0–0.9)
IMM GRANULOCYTES NFR BLD AUTO: 0.9 % (ref 0–0.9)
IMM GRANULOCYTES NFR BLD AUTO: 1.1 % (ref 0–0.9)
IMM GRANULOCYTES NFR BLD AUTO: 1.1 % (ref 0–0.9)
IMM GRANULOCYTES NFR BLD AUTO: 1.2 % (ref 0–0.9)
IMM GRANULOCYTES NFR BLD AUTO: 1.2 % (ref 0–0.9)
IMM GRANULOCYTES NFR BLD AUTO: 1.3 % (ref 0–0.9)
IMM GRANULOCYTES NFR BLD AUTO: 1.6 % (ref 0–0.9)
IMM GRANULOCYTES NFR BLD AUTO: 1.8 % (ref 0–0.9)
IMM GRANULOCYTES NFR BLD AUTO: 1.9 % (ref 0–0.9)
IMM GRANULOCYTES NFR BLD AUTO: 2.1 % (ref 0–0.9)
IMM GRANULOCYTES NFR BLD AUTO: 2.6 % (ref 0–0.9)
IMM RETICS NFR: 15.9 % (ref 2.6–16.1)
IMM RETICS NFR: 16.4 % (ref 9.3–17.4)
INHALED O2 FLOW RATE: 2 L/MIN
INHALED O2 FLOW RATE: ABNORMAL L/MIN
INR PPP: 1.22 (ref 0.87–1.13)
INR PPP: 1.25 (ref 0.87–1.13)
INR PPP: 1.28 (ref 0.87–1.13)
INR PPP: 1.31 (ref 0.87–1.13)
INR PPP: 1.71 (ref 0.87–1.13)
INR PPP: 1.88 (ref 0.87–1.13)
INR PPP: 2.39 (ref 0.87–1.13)
INR PPP: 3.68 (ref 0.87–1.13)
INT CON NEG: NORMAL
INT CON POS: NORMAL
IRON SATN MFR SERPL: 12 % (ref 15–55)
IRON SATN MFR SERPL: 14 % (ref 15–55)
IRON SATN MFR SERPL: 22 % (ref 15–55)
IRON SATN MFR SERPL: 8 % (ref 15–55)
IRON SERPL-MCNC: 21 UG/DL (ref 50–180)
IRON SERPL-MCNC: 32 UG/DL (ref 50–180)
IRON SERPL-MCNC: 36 UG/DL (ref 50–180)
IRON SERPL-MCNC: 36 UG/DL (ref 50–180)
KETONES UR STRIP.AUTO-MCNC: 15 MG/DL
KETONES UR STRIP.AUTO-MCNC: 15 MG/DL
KETONES UR STRIP.AUTO-MCNC: NEGATIVE MG/DL
LACTATE SERPL-SCNC: 0.6 MMOL/L (ref 0.5–2)
LACTATE SERPL-SCNC: 1.3 MMOL/L (ref 0.5–2)
LACTATE SERPL-SCNC: 1.4 MMOL/L (ref 0.5–2)
LACTATE SERPL-SCNC: 1.7 MMOL/L (ref 0.5–2)
LACTATE SERPL-SCNC: 1.9 MMOL/L (ref 0.5–2)
LACTATE SERPL-SCNC: 1.9 MMOL/L (ref 0.5–2)
LACTATE SERPL-SCNC: 2 MMOL/L (ref 0.5–2)
LACTATE SERPL-SCNC: 2.1 MMOL/L (ref 0.5–2)
LACTATE SERPL-SCNC: 2.1 MMOL/L (ref 0.5–2)
LACTATE SERPL-SCNC: 2.2 MMOL/L (ref 0.5–2)
LACTATE SERPL-SCNC: 2.4 MMOL/L (ref 0.5–2)
LACTATE SERPL-SCNC: 2.9 MMOL/L (ref 0.5–2)
LACTATE SERPL-SCNC: 3.2 MMOL/L (ref 0.5–2)
LACTATE SERPL-SCNC: 4.6 MMOL/L (ref 0.5–2)
LACTATE SERPL-SCNC: 6.6 MMOL/L (ref 0.5–2)
LACTATE SERPL-SCNC: 7.7 MMOL/L (ref 0.5–2)
LACTATE SERPL-SCNC: 7.9 MMOL/L (ref 0.5–2)
LDLC SERPL CALC-MCNC: 24 MG/DL
LEUKOCYTE ESTERASE UR QL STRIP.AUTO: ABNORMAL
LEUKOCYTE ESTERASE UR QL STRIP.AUTO: NEGATIVE
LEUKOCYTE ESTERASE UR QL STRIP.AUTO: NEGATIVE
LV EJECT FRACT  99904: 45
LV EJECT FRACT  99904: 55
LV EJECT FRACT  99904: 55
LV EJECT FRACT MOD 2C 99903: 45.36
LV EJECT FRACT MOD 4C 99902: 51
LV EJECT FRACT MOD BP 99901: 47.99
LYMPHOCYTES # BLD AUTO: 0.14 K/UL (ref 1–4.8)
LYMPHOCYTES # BLD AUTO: 0.35 K/UL (ref 1–4.8)
LYMPHOCYTES # BLD AUTO: 0.41 K/UL (ref 1–4.8)
LYMPHOCYTES # BLD AUTO: 0.47 K/UL (ref 1–4.8)
LYMPHOCYTES # BLD AUTO: 0.54 K/UL (ref 1–4.8)
LYMPHOCYTES # BLD AUTO: 0.74 K/UL (ref 1–4.8)
LYMPHOCYTES # BLD AUTO: 0.89 K/UL (ref 1–4.8)
LYMPHOCYTES # BLD AUTO: 0.94 K/UL (ref 1–4.8)
LYMPHOCYTES # BLD AUTO: 0.94 K/UL (ref 1–4.8)
LYMPHOCYTES # BLD AUTO: 0.98 K/UL (ref 1–4.8)
LYMPHOCYTES # BLD AUTO: 1.07 K/UL (ref 1–4.8)
LYMPHOCYTES # BLD AUTO: 1.14 K/UL (ref 1–4.8)
LYMPHOCYTES # BLD AUTO: 1.21 K/UL (ref 1–4.8)
LYMPHOCYTES # BLD AUTO: 1.25 K/UL (ref 1–4.8)
LYMPHOCYTES # BLD AUTO: 1.31 K/UL (ref 1–4.8)
LYMPHOCYTES # BLD AUTO: 1.31 K/UL (ref 1–4.8)
LYMPHOCYTES # BLD AUTO: 1.35 K/UL (ref 1–4.8)
LYMPHOCYTES # BLD AUTO: 1.39 K/UL (ref 1–4.8)
LYMPHOCYTES # BLD AUTO: 1.41 K/UL (ref 1–4.8)
LYMPHOCYTES # BLD AUTO: 1.41 K/UL (ref 1–4.8)
LYMPHOCYTES # BLD AUTO: 1.42 K/UL (ref 1–4.8)
LYMPHOCYTES # BLD AUTO: 1.44 K/UL (ref 1–4.8)
LYMPHOCYTES # BLD AUTO: 1.46 K/UL (ref 1–4.8)
LYMPHOCYTES # BLD AUTO: 1.49 K/UL (ref 1–4.8)
LYMPHOCYTES # BLD AUTO: 1.57 K/UL (ref 1–4.8)
LYMPHOCYTES # BLD AUTO: 1.7 K/UL (ref 1–4.8)
LYMPHOCYTES # BLD AUTO: 1.79 K/UL (ref 1–4.8)
LYMPHOCYTES # BLD AUTO: 1.8 K/UL (ref 1–4.8)
LYMPHOCYTES # BLD AUTO: 1.91 K/UL (ref 1–4.8)
LYMPHOCYTES # BLD AUTO: 2.06 K/UL (ref 1–4.8)
LYMPHOCYTES # BLD AUTO: 2.19 K/UL (ref 1–4.8)
LYMPHOCYTES # BLD AUTO: 2.23 K/UL (ref 1–4.8)
LYMPHOCYTES # BLD AUTO: 2.28 K/UL (ref 1–4.8)
LYMPHOCYTES # BLD AUTO: 2.36 K/UL (ref 1–4.8)
LYMPHOCYTES # BLD AUTO: 2.45 K/UL (ref 1–4.8)
LYMPHOCYTES # BLD AUTO: 2.46 K/UL (ref 1–4.8)
LYMPHOCYTES # BLD AUTO: 2.52 K/UL (ref 1–4.8)
LYMPHOCYTES # BLD AUTO: 2.72 K/UL (ref 1–4.8)
LYMPHOCYTES # BLD AUTO: 2.82 K/UL (ref 1–4.8)
LYMPHOCYTES # BLD AUTO: 2.82 K/UL (ref 1–4.8)
LYMPHOCYTES # BLD AUTO: 3.29 K/UL (ref 1–4.8)
LYMPHOCYTES NFR BLD: 1.5 % (ref 22–41)
LYMPHOCYTES NFR BLD: 11.2 % (ref 22–41)
LYMPHOCYTES NFR BLD: 11.3 % (ref 22–41)
LYMPHOCYTES NFR BLD: 11.3 % (ref 22–41)
LYMPHOCYTES NFR BLD: 11.4 % (ref 22–41)
LYMPHOCYTES NFR BLD: 12.4 % (ref 22–41)
LYMPHOCYTES NFR BLD: 12.9 % (ref 22–41)
LYMPHOCYTES NFR BLD: 13.1 % (ref 22–41)
LYMPHOCYTES NFR BLD: 14.3 % (ref 22–41)
LYMPHOCYTES NFR BLD: 14.6 % (ref 22–41)
LYMPHOCYTES NFR BLD: 15.2 % (ref 22–41)
LYMPHOCYTES NFR BLD: 15.8 % (ref 22–41)
LYMPHOCYTES NFR BLD: 16.7 % (ref 22–41)
LYMPHOCYTES NFR BLD: 17 % (ref 22–41)
LYMPHOCYTES NFR BLD: 2.2 % (ref 22–41)
LYMPHOCYTES NFR BLD: 20.4 % (ref 22–41)
LYMPHOCYTES NFR BLD: 21.4 % (ref 22–41)
LYMPHOCYTES NFR BLD: 21.8 % (ref 22–41)
LYMPHOCYTES NFR BLD: 22.1 % (ref 22–41)
LYMPHOCYTES NFR BLD: 22.3 % (ref 22–41)
LYMPHOCYTES NFR BLD: 22.9 % (ref 22–41)
LYMPHOCYTES NFR BLD: 23.2 % (ref 22–41)
LYMPHOCYTES NFR BLD: 23.7 % (ref 22–41)
LYMPHOCYTES NFR BLD: 30.2 % (ref 22–41)
LYMPHOCYTES NFR BLD: 33.7 % (ref 22–41)
LYMPHOCYTES NFR BLD: 34.9 % (ref 22–41)
LYMPHOCYTES NFR BLD: 4.3 % (ref 22–41)
LYMPHOCYTES NFR BLD: 4.7 % (ref 22–41)
LYMPHOCYTES NFR BLD: 42.9 % (ref 22–41)
LYMPHOCYTES NFR BLD: 45.9 % (ref 22–41)
LYMPHOCYTES NFR BLD: 49.3 % (ref 22–41)
LYMPHOCYTES NFR BLD: 5.2 % (ref 22–41)
LYMPHOCYTES NFR BLD: 5.2 % (ref 22–41)
LYMPHOCYTES NFR BLD: 7.8 % (ref 22–41)
LYMPHOCYTES NFR BLD: 7.8 % (ref 22–41)
LYMPHOCYTES NFR BLD: 8.1 % (ref 22–41)
LYMPHOCYTES NFR BLD: 8.5 % (ref 22–41)
LYMPHOCYTES NFR BLD: 8.6 % (ref 22–41)
LYMPHOCYTES NFR BLD: 9.3 % (ref 22–41)
MACROCYTES BLD QL SMEAR: ABNORMAL
MAGNESIUM SERPL-MCNC: 1.4 MG/DL (ref 1.5–2.5)
MAGNESIUM SERPL-MCNC: 1.6 MG/DL (ref 1.5–2.5)
MAGNESIUM SERPL-MCNC: 1.7 MG/DL (ref 1.5–2.5)
MAGNESIUM SERPL-MCNC: 1.8 MG/DL (ref 1.5–2.5)
MAGNESIUM SERPL-MCNC: 1.9 MG/DL (ref 1.5–2.5)
MAGNESIUM SERPL-MCNC: 2 MG/DL (ref 1.5–2.5)
MAGNESIUM SERPL-MCNC: 2.1 MG/DL (ref 1.5–2.5)
MAGNESIUM SERPL-MCNC: 2.2 MG/DL (ref 1.5–2.5)
MAGNESIUM SERPL-MCNC: 2.3 MG/DL (ref 1.5–2.5)
MAGNESIUM SERPL-MCNC: 2.4 MG/DL (ref 1.5–2.5)
MAGNESIUM SERPL-MCNC: 2.4 MG/DL (ref 1.5–2.5)
MAGNESIUM SERPL-MCNC: 2.5 MG/DL (ref 1.5–2.5)
MANUAL DIFF BLD: ABNORMAL
MANUAL DIFF BLD: NORMAL
MCH RBC QN AUTO: 26.1 PG (ref 27–33)
MCH RBC QN AUTO: 26.2 PG (ref 27–33)
MCH RBC QN AUTO: 26.2 PG (ref 27–33)
MCH RBC QN AUTO: 26.5 PG (ref 27–33)
MCH RBC QN AUTO: 26.6 PG (ref 27–33)
MCH RBC QN AUTO: 26.7 PG (ref 27–33)
MCH RBC QN AUTO: 26.7 PG (ref 27–33)
MCH RBC QN AUTO: 26.8 PG (ref 27–33)
MCH RBC QN AUTO: 26.9 PG (ref 27–33)
MCH RBC QN AUTO: 27 PG (ref 27–33)
MCH RBC QN AUTO: 27.1 PG (ref 27–33)
MCH RBC QN AUTO: 27.2 PG (ref 27–33)
MCH RBC QN AUTO: 27.2 PG (ref 27–33)
MCH RBC QN AUTO: 27.4 PG (ref 27–33)
MCH RBC QN AUTO: 27.5 PG (ref 27–33)
MCH RBC QN AUTO: 27.5 PG (ref 27–33)
MCH RBC QN AUTO: 27.6 PG (ref 27–33)
MCH RBC QN AUTO: 27.7 PG (ref 27–33)
MCH RBC QN AUTO: 27.8 PG (ref 27–33)
MCH RBC QN AUTO: 27.8 PG (ref 27–33)
MCH RBC QN AUTO: 27.9 PG (ref 27–33)
MCH RBC QN AUTO: 28 PG (ref 27–33)
MCH RBC QN AUTO: 28 PG (ref 27–33)
MCH RBC QN AUTO: 28.2 PG (ref 27–33)
MCH RBC QN AUTO: 28.3 PG (ref 27–33)
MCH RBC QN AUTO: 28.3 PG (ref 27–33)
MCH RBC QN AUTO: 28.4 PG (ref 27–33)
MCH RBC QN AUTO: 28.5 PG (ref 27–33)
MCH RBC QN AUTO: 28.6 PG (ref 27–33)
MCH RBC QN AUTO: 28.6 PG (ref 27–33)
MCH RBC QN AUTO: 28.7 PG (ref 27–33)
MCH RBC QN AUTO: 28.8 PG (ref 27–33)
MCH RBC QN AUTO: 28.8 PG (ref 27–33)
MCH RBC QN AUTO: 28.9 PG (ref 27–33)
MCH RBC QN AUTO: 28.9 PG (ref 27–33)
MCH RBC QN AUTO: 29 PG (ref 27–33)
MCH RBC QN AUTO: 29 PG (ref 27–33)
MCH RBC QN AUTO: 29.1 PG (ref 27–33)
MCH RBC QN AUTO: 29.2 PG (ref 27–33)
MCH RBC QN AUTO: 29.3 PG (ref 27–33)
MCHC RBC AUTO-ENTMCNC: 29.3 G/DL (ref 32.3–36.5)
MCHC RBC AUTO-ENTMCNC: 29.4 G/DL (ref 32.3–36.5)
MCHC RBC AUTO-ENTMCNC: 29.5 G/DL (ref 32.3–36.5)
MCHC RBC AUTO-ENTMCNC: 29.6 G/DL (ref 32.3–36.5)
MCHC RBC AUTO-ENTMCNC: 29.7 G/DL (ref 32.3–36.5)
MCHC RBC AUTO-ENTMCNC: 29.8 G/DL (ref 32.3–36.5)
MCHC RBC AUTO-ENTMCNC: 29.8 G/DL (ref 32.3–36.5)
MCHC RBC AUTO-ENTMCNC: 29.9 G/DL (ref 32.3–36.5)
MCHC RBC AUTO-ENTMCNC: 30 G/DL (ref 32.3–36.5)
MCHC RBC AUTO-ENTMCNC: 30.2 G/DL (ref 32.3–36.5)
MCHC RBC AUTO-ENTMCNC: 30.4 G/DL (ref 32.3–36.5)
MCHC RBC AUTO-ENTMCNC: 30.5 G/DL (ref 32.3–36.5)
MCHC RBC AUTO-ENTMCNC: 30.6 G/DL (ref 32.3–36.5)
MCHC RBC AUTO-ENTMCNC: 30.6 G/DL (ref 33.7–35.3)
MCHC RBC AUTO-ENTMCNC: 30.7 G/DL (ref 32.3–36.5)
MCHC RBC AUTO-ENTMCNC: 30.7 G/DL (ref 33.7–35.3)
MCHC RBC AUTO-ENTMCNC: 30.8 G/DL (ref 32.3–36.5)
MCHC RBC AUTO-ENTMCNC: 30.8 G/DL (ref 33.7–35.3)
MCHC RBC AUTO-ENTMCNC: 30.9 G/DL (ref 32.3–36.5)
MCHC RBC AUTO-ENTMCNC: 31 G/DL (ref 32.3–36.5)
MCHC RBC AUTO-ENTMCNC: 31.1 G/DL (ref 32.3–36.5)
MCHC RBC AUTO-ENTMCNC: 31.1 G/DL (ref 32.3–36.5)
MCHC RBC AUTO-ENTMCNC: 31.2 G/DL (ref 32.3–36.5)
MCHC RBC AUTO-ENTMCNC: 31.3 G/DL (ref 32.3–36.5)
MCHC RBC AUTO-ENTMCNC: 31.4 G/DL (ref 32.3–36.5)
MCHC RBC AUTO-ENTMCNC: 31.5 G/DL (ref 32.3–36.5)
MCHC RBC AUTO-ENTMCNC: 31.6 G/DL (ref 32.3–36.5)
MCHC RBC AUTO-ENTMCNC: 31.6 G/DL (ref 32.3–36.5)
MCHC RBC AUTO-ENTMCNC: 31.7 G/DL (ref 32.3–36.5)
MCHC RBC AUTO-ENTMCNC: 31.8 G/DL (ref 32.3–36.5)
MCHC RBC AUTO-ENTMCNC: 31.8 G/DL (ref 33.7–35.3)
MCHC RBC AUTO-ENTMCNC: 31.9 G/DL (ref 32.3–36.5)
MCHC RBC AUTO-ENTMCNC: 32 G/DL (ref 32.3–36.5)
MCHC RBC AUTO-ENTMCNC: 32.1 G/DL (ref 32.3–36.5)
MCHC RBC AUTO-ENTMCNC: 32.2 G/DL (ref 32.3–36.5)
MCHC RBC AUTO-ENTMCNC: 32.2 G/DL (ref 33.7–35.3)
MCHC RBC AUTO-ENTMCNC: 32.3 G/DL (ref 32.3–36.5)
MCHC RBC AUTO-ENTMCNC: 32.4 G/DL (ref 33.7–35.3)
MCHC RBC AUTO-ENTMCNC: 32.5 G/DL (ref 33.7–35.3)
MCHC RBC AUTO-ENTMCNC: 32.5 G/DL (ref 33.7–35.3)
MCHC RBC AUTO-ENTMCNC: 32.6 G/DL (ref 32.3–36.5)
MCHC RBC AUTO-ENTMCNC: 32.6 G/DL (ref 33.7–35.3)
MCHC RBC AUTO-ENTMCNC: 32.7 G/DL (ref 32.3–36.5)
MCHC RBC AUTO-ENTMCNC: 32.7 G/DL (ref 32.3–36.5)
MCHC RBC AUTO-ENTMCNC: 33.1 G/DL (ref 33.7–35.3)
MCHC RBC AUTO-ENTMCNC: 33.2 G/DL (ref 33.7–35.3)
MCHC RBC AUTO-ENTMCNC: 33.3 G/DL (ref 32.3–36.5)
MCHC RBC AUTO-ENTMCNC: 33.4 G/DL (ref 33.7–35.3)
MCHC RBC AUTO-ENTMCNC: 33.5 G/DL (ref 33.7–35.3)
MCHC RBC AUTO-ENTMCNC: 33.5 G/DL (ref 33.7–35.3)
MCHC RBC AUTO-ENTMCNC: 33.6 G/DL (ref 33.7–35.3)
MCHC RBC AUTO-ENTMCNC: 33.7 G/DL (ref 33.7–35.3)
MCHC RBC AUTO-ENTMCNC: 34.1 G/DL (ref 33.7–35.3)
MCV RBC AUTO: 83.5 FL (ref 81.4–97.8)
MCV RBC AUTO: 83.8 FL (ref 81.4–97.8)
MCV RBC AUTO: 85 FL (ref 81.4–97.8)
MCV RBC AUTO: 85.2 FL (ref 81.4–97.8)
MCV RBC AUTO: 85.8 FL (ref 81.4–97.8)
MCV RBC AUTO: 85.9 FL (ref 81.4–97.8)
MCV RBC AUTO: 86 FL (ref 81.4–97.8)
MCV RBC AUTO: 86.2 FL (ref 81.4–97.8)
MCV RBC AUTO: 86.3 FL (ref 81.4–97.8)
MCV RBC AUTO: 86.4 FL (ref 81.4–97.8)
MCV RBC AUTO: 86.5 FL (ref 81.4–97.8)
MCV RBC AUTO: 86.7 FL (ref 81.4–97.8)
MCV RBC AUTO: 86.7 FL (ref 81.4–97.8)
MCV RBC AUTO: 86.9 FL (ref 81.4–97.8)
MCV RBC AUTO: 86.9 FL (ref 81.4–97.8)
MCV RBC AUTO: 87 FL (ref 81.4–97.8)
MCV RBC AUTO: 87.1 FL (ref 81.4–97.8)
MCV RBC AUTO: 87.1 FL (ref 81.4–97.8)
MCV RBC AUTO: 87.3 FL (ref 81.4–97.8)
MCV RBC AUTO: 87.5 FL (ref 81.4–97.8)
MCV RBC AUTO: 87.6 FL (ref 81.4–97.8)
MCV RBC AUTO: 87.7 FL (ref 81.4–97.8)
MCV RBC AUTO: 87.7 FL (ref 81.4–97.8)
MCV RBC AUTO: 87.9 FL (ref 81.4–97.8)
MCV RBC AUTO: 87.9 FL (ref 81.4–97.8)
MCV RBC AUTO: 88 FL (ref 81.4–97.8)
MCV RBC AUTO: 88.1 FL (ref 81.4–97.8)
MCV RBC AUTO: 88.1 FL (ref 81.4–97.8)
MCV RBC AUTO: 88.3 FL (ref 81.4–97.8)
MCV RBC AUTO: 88.4 FL (ref 81.4–97.8)
MCV RBC AUTO: 88.4 FL (ref 81.4–97.8)
MCV RBC AUTO: 88.5 FL (ref 81.4–97.8)
MCV RBC AUTO: 88.8 FL (ref 81.4–97.8)
MCV RBC AUTO: 89.1 FL (ref 81.4–97.8)
MCV RBC AUTO: 89.2 FL (ref 81.4–97.8)
MCV RBC AUTO: 89.2 FL (ref 81.4–97.8)
MCV RBC AUTO: 89.3 FL (ref 81.4–97.8)
MCV RBC AUTO: 89.4 FL (ref 81.4–97.8)
MCV RBC AUTO: 89.5 FL (ref 81.4–97.8)
MCV RBC AUTO: 89.6 FL (ref 81.4–97.8)
MCV RBC AUTO: 89.8 FL (ref 81.4–97.8)
MCV RBC AUTO: 89.8 FL (ref 81.4–97.8)
MCV RBC AUTO: 89.9 FL (ref 81.4–97.8)
MCV RBC AUTO: 90 FL (ref 81.4–97.8)
MCV RBC AUTO: 90.1 FL (ref 81.4–97.8)
MCV RBC AUTO: 90.1 FL (ref 81.4–97.8)
MCV RBC AUTO: 90.4 FL (ref 81.4–97.8)
MCV RBC AUTO: 90.6 FL (ref 81.4–97.8)
MCV RBC AUTO: 90.7 FL (ref 81.4–97.8)
MCV RBC AUTO: 90.8 FL (ref 81.4–97.8)
MCV RBC AUTO: 91 FL (ref 81.4–97.8)
MCV RBC AUTO: 91 FL (ref 81.4–97.8)
MCV RBC AUTO: 91.2 FL (ref 81.4–97.8)
MCV RBC AUTO: 91.7 FL (ref 81.4–97.8)
MCV RBC AUTO: 91.7 FL (ref 81.4–97.8)
MCV RBC AUTO: 91.8 FL (ref 81.4–97.8)
MCV RBC AUTO: 91.8 FL (ref 81.4–97.8)
MCV RBC AUTO: 91.9 FL (ref 81.4–97.8)
MCV RBC AUTO: 91.9 FL (ref 81.4–97.8)
MCV RBC AUTO: 92.1 FL (ref 81.4–97.8)
MCV RBC AUTO: 92.2 FL (ref 81.4–97.8)
MCV RBC AUTO: 92.6 FL (ref 81.4–97.8)
MCV RBC AUTO: 92.7 FL (ref 81.4–97.8)
MCV RBC AUTO: 92.9 FL (ref 81.4–97.8)
MCV RBC AUTO: 93.2 FL (ref 81.4–97.8)
MCV RBC AUTO: 93.4 FL (ref 81.4–97.8)
METAMYELOCYTES NFR BLD MANUAL: 0.9 %
METAMYELOCYTES NFR BLD MANUAL: 1.7 %
METAMYELOCYTES NFR BLD MANUAL: 2.2 %
METAMYELOCYTES NFR BLD MANUAL: 3.9 %
METHADONE UR QL SCN: NEGATIVE
METHADONE UR QL SCN: NEGATIVE
MICRO URNS: ABNORMAL
MICRO URNS: NORMAL
MICROCYTES BLD QL SMEAR: ABNORMAL
MICROCYTES BLD QL SMEAR: ABNORMAL
MITOCHONDRIA M2 IGG SER-ACNC: 2.8 UNITS (ref 0–24.9)
MONOCYTES # BLD AUTO: 0 K/UL (ref 0–0.85)
MONOCYTES # BLD AUTO: 0.12 K/UL (ref 0–0.85)
MONOCYTES # BLD AUTO: 0.26 K/UL (ref 0–0.85)
MONOCYTES # BLD AUTO: 0.31 K/UL (ref 0–0.85)
MONOCYTES # BLD AUTO: 0.33 K/UL (ref 0–0.85)
MONOCYTES # BLD AUTO: 0.35 K/UL (ref 0–0.85)
MONOCYTES # BLD AUTO: 0.35 K/UL (ref 0–0.85)
MONOCYTES # BLD AUTO: 0.37 K/UL (ref 0–0.85)
MONOCYTES # BLD AUTO: 0.4 K/UL (ref 0–0.85)
MONOCYTES # BLD AUTO: 0.41 K/UL (ref 0–0.85)
MONOCYTES # BLD AUTO: 0.42 K/UL (ref 0–0.85)
MONOCYTES # BLD AUTO: 0.44 K/UL (ref 0–0.85)
MONOCYTES # BLD AUTO: 0.46 K/UL (ref 0–0.85)
MONOCYTES # BLD AUTO: 0.46 K/UL (ref 0–0.85)
MONOCYTES # BLD AUTO: 0.5 K/UL (ref 0–0.85)
MONOCYTES # BLD AUTO: 0.52 K/UL (ref 0–0.85)
MONOCYTES # BLD AUTO: 0.52 K/UL (ref 0–0.85)
MONOCYTES # BLD AUTO: 0.53 K/UL (ref 0–0.85)
MONOCYTES # BLD AUTO: 0.53 K/UL (ref 0–0.85)
MONOCYTES # BLD AUTO: 0.54 K/UL (ref 0–0.85)
MONOCYTES # BLD AUTO: 0.57 K/UL (ref 0–0.85)
MONOCYTES # BLD AUTO: 0.64 K/UL (ref 0–0.85)
MONOCYTES # BLD AUTO: 0.69 K/UL (ref 0–0.85)
MONOCYTES # BLD AUTO: 0.7 K/UL (ref 0–0.85)
MONOCYTES # BLD AUTO: 0.71 K/UL (ref 0–0.85)
MONOCYTES # BLD AUTO: 0.73 K/UL (ref 0–0.85)
MONOCYTES # BLD AUTO: 0.77 K/UL (ref 0–0.85)
MONOCYTES # BLD AUTO: 0.8 K/UL (ref 0–0.85)
MONOCYTES # BLD AUTO: 0.83 K/UL (ref 0–0.85)
MONOCYTES # BLD AUTO: 0.87 K/UL (ref 0–0.85)
MONOCYTES # BLD AUTO: 0.89 K/UL (ref 0–0.85)
MONOCYTES # BLD AUTO: 0.95 K/UL (ref 0–0.85)
MONOCYTES # BLD AUTO: 0.97 K/UL (ref 0–0.85)
MONOCYTES # BLD AUTO: 0.99 K/UL (ref 0–0.85)
MONOCYTES # BLD AUTO: 1.11 K/UL (ref 0–0.85)
MONOCYTES # BLD AUTO: 1.27 K/UL (ref 0–0.85)
MONOCYTES # BLD AUTO: 1.28 K/UL (ref 0–0.85)
MONOCYTES # BLD AUTO: 1.39 K/UL (ref 0–0.85)
MONOCYTES # BLD AUTO: 1.44 K/UL (ref 0–0.85)
MONOCYTES # BLD AUTO: 1.64 K/UL (ref 0–0.85)
MONOCYTES # BLD AUTO: 2.21 K/UL (ref 0–0.85)
MONOCYTES NFR BLD AUTO: 0 % (ref 0–13.4)
MONOCYTES NFR BLD AUTO: 1.6 % (ref 0–13.4)
MONOCYTES NFR BLD AUTO: 10.3 % (ref 0–13.4)
MONOCYTES NFR BLD AUTO: 10.7 % (ref 0–13.4)
MONOCYTES NFR BLD AUTO: 11.1 % (ref 0–13.4)
MONOCYTES NFR BLD AUTO: 11.1 % (ref 0–13.4)
MONOCYTES NFR BLD AUTO: 11.5 % (ref 0–13.4)
MONOCYTES NFR BLD AUTO: 12.3 % (ref 0–13.4)
MONOCYTES NFR BLD AUTO: 13.9 % (ref 0–13.4)
MONOCYTES NFR BLD AUTO: 15.3 % (ref 0–13.4)
MONOCYTES NFR BLD AUTO: 2.3 % (ref 0–13.4)
MONOCYTES NFR BLD AUTO: 3 % (ref 0–13.4)
MONOCYTES NFR BLD AUTO: 3.5 % (ref 0–13.4)
MONOCYTES NFR BLD AUTO: 3.5 % (ref 0–13.4)
MONOCYTES NFR BLD AUTO: 3.6 % (ref 0–13.4)
MONOCYTES NFR BLD AUTO: 3.7 % (ref 0–13.4)
MONOCYTES NFR BLD AUTO: 3.8 % (ref 0–13.4)
MONOCYTES NFR BLD AUTO: 3.9 % (ref 0–13.4)
MONOCYTES NFR BLD AUTO: 4.3 % (ref 0–13.4)
MONOCYTES NFR BLD AUTO: 4.4 % (ref 0–13.4)
MONOCYTES NFR BLD AUTO: 4.6 % (ref 0–13.4)
MONOCYTES NFR BLD AUTO: 5 % (ref 0–13.4)
MONOCYTES NFR BLD AUTO: 5.3 % (ref 0–13.4)
MONOCYTES NFR BLD AUTO: 6 % (ref 0–13.4)
MONOCYTES NFR BLD AUTO: 6.1 % (ref 0–13.4)
MONOCYTES NFR BLD AUTO: 6.9 % (ref 0–13.4)
MONOCYTES NFR BLD AUTO: 7 % (ref 0–13.4)
MONOCYTES NFR BLD AUTO: 7.2 % (ref 0–13.4)
MONOCYTES NFR BLD AUTO: 7.3 % (ref 0–13.4)
MONOCYTES NFR BLD AUTO: 7.4 % (ref 0–13.4)
MONOCYTES NFR BLD AUTO: 7.4 % (ref 0–13.4)
MONOCYTES NFR BLD AUTO: 7.6 % (ref 0–13.4)
MONOCYTES NFR BLD AUTO: 7.8 % (ref 0–13.4)
MONOCYTES NFR BLD AUTO: 7.9 % (ref 0–13.4)
MONOCYTES NFR BLD AUTO: 8.2 % (ref 0–13.4)
MONOCYTES NFR BLD AUTO: 8.3 % (ref 0–13.4)
MONOCYTES NFR BLD AUTO: 8.5 % (ref 0–13.4)
MONOCYTES NFR BLD AUTO: 8.6 % (ref 0–13.4)
MONOCYTES NFR BLD AUTO: 9.2 % (ref 0–13.4)
MORPHOLOGY BLD-IMP: NORMAL
MYCOBACTERIUM SPEC CULT: NORMAL
MYCOBACTERIUM SPEC CULT: NORMAL
MYELOCYTES NFR BLD MANUAL: 0.8 %
MYELOCYTES NFR BLD MANUAL: 0.9 %
MYELOCYTES NFR BLD MANUAL: 1.7 %
MYELOCYTES NFR BLD MANUAL: 3.4 %
NEUTROPHILS # BLD AUTO: 1.78 K/UL (ref 1.82–7.42)
NEUTROPHILS # BLD AUTO: 1.88 K/UL (ref 1.82–7.42)
NEUTROPHILS # BLD AUTO: 11.8 K/UL (ref 1.82–7.42)
NEUTROPHILS # BLD AUTO: 13.16 K/UL (ref 1.82–7.42)
NEUTROPHILS # BLD AUTO: 18.1 K/UL (ref 1.82–7.42)
NEUTROPHILS # BLD AUTO: 2.55 K/UL (ref 1.82–7.42)
NEUTROPHILS # BLD AUTO: 20.42 K/UL (ref 1.82–7.42)
NEUTROPHILS # BLD AUTO: 25.71 K/UL (ref 1.82–7.42)
NEUTROPHILS # BLD AUTO: 28.16 K/UL (ref 1.82–7.42)
NEUTROPHILS # BLD AUTO: 3.54 K/UL (ref 1.82–7.42)
NEUTROPHILS # BLD AUTO: 3.68 K/UL (ref 1.82–7.42)
NEUTROPHILS # BLD AUTO: 3.8 K/UL (ref 1.82–7.42)
NEUTROPHILS # BLD AUTO: 3.81 K/UL (ref 1.82–7.42)
NEUTROPHILS # BLD AUTO: 30.17 K/UL (ref 1.82–7.42)
NEUTROPHILS # BLD AUTO: 31.2 K/UL (ref 1.82–7.42)
NEUTROPHILS # BLD AUTO: 4.11 K/UL (ref 1.82–7.42)
NEUTROPHILS # BLD AUTO: 4.37 K/UL (ref 1.82–7.42)
NEUTROPHILS # BLD AUTO: 4.88 K/UL (ref 1.82–7.42)
NEUTROPHILS # BLD AUTO: 5.08 K/UL (ref 1.82–7.42)
NEUTROPHILS # BLD AUTO: 5.16 K/UL (ref 1.82–7.42)
NEUTROPHILS # BLD AUTO: 5.71 K/UL (ref 1.82–7.42)
NEUTROPHILS # BLD AUTO: 5.81 K/UL (ref 1.82–7.42)
NEUTROPHILS # BLD AUTO: 6.04 K/UL (ref 1.82–7.42)
NEUTROPHILS # BLD AUTO: 6.68 K/UL (ref 1.82–7.42)
NEUTROPHILS # BLD AUTO: 6.71 K/UL (ref 1.82–7.42)
NEUTROPHILS # BLD AUTO: 6.87 K/UL (ref 1.82–7.42)
NEUTROPHILS # BLD AUTO: 6.89 K/UL (ref 1.82–7.42)
NEUTROPHILS # BLD AUTO: 6.92 K/UL (ref 1.82–7.42)
NEUTROPHILS # BLD AUTO: 7 K/UL (ref 1.82–7.42)
NEUTROPHILS # BLD AUTO: 7.2 K/UL (ref 1.82–7.42)
NEUTROPHILS # BLD AUTO: 7.57 K/UL (ref 1.82–7.42)
NEUTROPHILS # BLD AUTO: 7.69 K/UL (ref 1.82–7.42)
NEUTROPHILS # BLD AUTO: 7.72 K/UL (ref 1.82–7.42)
NEUTROPHILS # BLD AUTO: 7.82 K/UL (ref 1.82–7.42)
NEUTROPHILS # BLD AUTO: 8.02 K/UL (ref 1.82–7.42)
NEUTROPHILS # BLD AUTO: 8.32 K/UL (ref 1.82–7.42)
NEUTROPHILS # BLD AUTO: 8.41 K/UL (ref 1.82–7.42)
NEUTROPHILS # BLD AUTO: 8.43 K/UL (ref 1.82–7.42)
NEUTROPHILS # BLD AUTO: 8.59 K/UL (ref 1.82–7.42)
NEUTROPHILS # BLD AUTO: 9.15 K/UL (ref 1.82–7.42)
NEUTROPHILS # BLD AUTO: 9.18 K/UL (ref 1.82–7.42)
NEUTROPHILS NFR BLD: 37.1 % (ref 44–72)
NEUTROPHILS NFR BLD: 37.8 % (ref 44–72)
NEUTROPHILS NFR BLD: 44.7 % (ref 44–72)
NEUTROPHILS NFR BLD: 51.6 % (ref 44–72)
NEUTROPHILS NFR BLD: 52.6 % (ref 44–72)
NEUTROPHILS NFR BLD: 53.4 % (ref 44–72)
NEUTROPHILS NFR BLD: 53.4 % (ref 44–72)
NEUTROPHILS NFR BLD: 57.5 % (ref 44–72)
NEUTROPHILS NFR BLD: 58.3 % (ref 44–72)
NEUTROPHILS NFR BLD: 65.5 % (ref 44–72)
NEUTROPHILS NFR BLD: 67 % (ref 44–72)
NEUTROPHILS NFR BLD: 67 % (ref 44–72)
NEUTROPHILS NFR BLD: 68.4 % (ref 44–72)
NEUTROPHILS NFR BLD: 69.4 % (ref 44–72)
NEUTROPHILS NFR BLD: 69.9 % (ref 44–72)
NEUTROPHILS NFR BLD: 74.2 % (ref 44–72)
NEUTROPHILS NFR BLD: 74.9 % (ref 44–72)
NEUTROPHILS NFR BLD: 75 % (ref 44–72)
NEUTROPHILS NFR BLD: 75.4 % (ref 44–72)
NEUTROPHILS NFR BLD: 76.3 % (ref 44–72)
NEUTROPHILS NFR BLD: 76.7 % (ref 44–72)
NEUTROPHILS NFR BLD: 77.9 % (ref 44–72)
NEUTROPHILS NFR BLD: 79.4 % (ref 44–72)
NEUTROPHILS NFR BLD: 80.5 % (ref 44–72)
NEUTROPHILS NFR BLD: 82.7 % (ref 44–72)
NEUTROPHILS NFR BLD: 82.9 % (ref 44–72)
NEUTROPHILS NFR BLD: 83.2 % (ref 44–72)
NEUTROPHILS NFR BLD: 83.5 % (ref 44–72)
NEUTROPHILS NFR BLD: 83.7 % (ref 44–72)
NEUTROPHILS NFR BLD: 84.2 % (ref 44–72)
NEUTROPHILS NFR BLD: 85.1 % (ref 44–72)
NEUTROPHILS NFR BLD: 85.5 % (ref 44–72)
NEUTROPHILS NFR BLD: 86.2 % (ref 44–72)
NEUTROPHILS NFR BLD: 86.4 % (ref 44–72)
NEUTROPHILS NFR BLD: 86.5 % (ref 44–72)
NEUTROPHILS NFR BLD: 86.6 % (ref 44–72)
NEUTROPHILS NFR BLD: 87 % (ref 44–72)
NEUTROPHILS NFR BLD: 87.3 % (ref 44–72)
NEUTROPHILS NFR BLD: 88.9 % (ref 44–72)
NEUTROPHILS NFR BLD: 89.1 % (ref 44–72)
NEUTROPHILS NFR BLD: 94.6 % (ref 44–72)
NEUTS BAND NFR BLD MANUAL: 10.1 % (ref 0–10)
NEUTS BAND NFR BLD MANUAL: 3.9 % (ref 0–10)
NITRITE UR QL STRIP.AUTO: NEGATIVE
NRBC # BLD AUTO: 0 K/UL
NRBC # BLD AUTO: 0.02 K/UL
NRBC # BLD AUTO: 0.03 K/UL
NRBC # BLD AUTO: 0.05 K/UL
NRBC # BLD AUTO: 0.06 K/UL
NRBC # BLD AUTO: 0.12 K/UL
NRBC # BLD AUTO: 0.19 K/UL
NRBC # BLD AUTO: 0.2 K/UL
NRBC BLD-RTO: 0 /100 WBC
NRBC BLD-RTO: 0 /100 WBC (ref 0–0.2)
NRBC BLD-RTO: 0.1 /100 WBC (ref 0–0.2)
NRBC BLD-RTO: 0.1 /100 WBC (ref 0–0.2)
NRBC BLD-RTO: 0.2 /100 WBC (ref 0–0.2)
NRBC BLD-RTO: 0.3 /100 WBC (ref 0–0.2)
NRBC BLD-RTO: 0.4 /100 WBC (ref 0–0.2)
NRBC BLD-RTO: 0.4 /100 WBC (ref 0–0.2)
NRBC BLD-RTO: 0.5 /100 WBC (ref 0–0.2)
NRBC BLD-RTO: 0.6 /100 WBC (ref 0–0.2)
NRBC BLD-RTO: 1.3 /100 WBC (ref 0–0.2)
NRBC BLD-RTO: 2 /100 WBC (ref 0–0.2)
NRBC BLD-RTO: 2.4 /100 WBC (ref 0–0.2)
NT-PROBNP SERPL IA-MCNC: 1220 PG/ML (ref 0–125)
NT-PROBNP SERPL IA-MCNC: 4093 PG/ML (ref 0–125)
NT-PROBNP SERPL IA-MCNC: 9392 PG/ML (ref 0–125)
NT-PROBNP SERPL IA-MCNC: 9686 PG/ML (ref 0–125)
NT-PROBNP SERPL IA-MCNC: ABNORMAL PG/ML (ref 0–125)
NUCLEAR IGG SER QL IA: NORMAL
OPIATES UR QL SCN: NEGATIVE
OPIATES UR QL SCN: NEGATIVE
OSMOLALITY UR: 236 MOSM/KG H2O (ref 300–900)
OSMOLALITY UR: 328 MOSM/KG H2O (ref 300–900)
OSMOLALITY UR: 340 MOSM/KG H2O (ref 300–900)
OSMOLALITY UR: 396 MOSM/KG H2O (ref 300–900)
OVALOCYTES BLD QL SMEAR: NORMAL
OXYCODONE UR QL SCN: NEGATIVE
OXYCODONE UR QL SCN: NEGATIVE
PATHOLOGY CONSULT NOTE: NORMAL
PATHOLOGY CONSULT NOTE: NORMAL
PCO2 BLDA: 44.2 MMHG (ref 26–37)
PCO2 BLDV: 38.8 MMHG (ref 41–51)
PCO2 BLDV: 41.1 MMHG (ref 41–51)
PCO2 TEMP ADJ BLDA: 46 MMHG (ref 26–37)
PCO2 TEMP ADJ BLDV: 38.5 MMHG (ref 41–51)
PCO2 TEMP ADJ BLDV: 41.8 MMHG (ref 41–51)
PCP UR QL SCN: NEGATIVE
PCP UR QL SCN: NEGATIVE
PH BLDA: 7.37 [PH] (ref 7.4–7.5)
PH BLDV: 7.34 [PH] (ref 7.31–7.45)
PH BLDV: 7.35 [PH] (ref 7.31–7.45)
PH TEMP ADJ BLDA: 7.36 [PH] (ref 7.4–7.5)
PH TEMP ADJ BLDV: 7.34 [PH] (ref 7.31–7.45)
PH TEMP ADJ BLDV: 7.35 [PH] (ref 7.31–7.45)
PH UR STRIP.AUTO: 5 [PH] (ref 5–8)
PH UR STRIP.AUTO: 5.5 [PH] (ref 5–8)
PH UR STRIP.AUTO: 6 [PH] (ref 5–8)
PHOSPHATE SERPL-MCNC: 1.3 MG/DL (ref 2.5–4.5)
PHOSPHATE SERPL-MCNC: 1.4 MG/DL (ref 2.5–4.5)
PHOSPHATE SERPL-MCNC: 1.8 MG/DL (ref 2.5–4.5)
PHOSPHATE SERPL-MCNC: 1.9 MG/DL (ref 2.5–4.5)
PHOSPHATE SERPL-MCNC: 1.9 MG/DL (ref 2.5–4.5)
PHOSPHATE SERPL-MCNC: 2.1 MG/DL (ref 2.5–4.5)
PHOSPHATE SERPL-MCNC: 2.1 MG/DL (ref 2.5–4.5)
PHOSPHATE SERPL-MCNC: 2.2 MG/DL (ref 2.5–4.5)
PHOSPHATE SERPL-MCNC: 2.4 MG/DL (ref 2.5–4.5)
PHOSPHATE SERPL-MCNC: 2.5 MG/DL (ref 2.5–4.5)
PHOSPHATE SERPL-MCNC: 2.5 MG/DL (ref 2.5–4.5)
PHOSPHATE SERPL-MCNC: 2.6 MG/DL (ref 2.5–4.5)
PHOSPHATE SERPL-MCNC: 2.7 MG/DL (ref 2.5–4.5)
PHOSPHATE SERPL-MCNC: 2.7 MG/DL (ref 2.5–4.5)
PHOSPHATE SERPL-MCNC: 2.8 MG/DL (ref 2.5–4.5)
PHOSPHATE SERPL-MCNC: 3 MG/DL (ref 2.5–4.5)
PHOSPHATE SERPL-MCNC: 3.1 MG/DL (ref 2.5–4.5)
PHOSPHATE SERPL-MCNC: 3.1 MG/DL (ref 2.5–4.5)
PHOSPHATE SERPL-MCNC: 3.2 MG/DL (ref 2.5–4.5)
PHOSPHATE SERPL-MCNC: 3.3 MG/DL (ref 2.5–4.5)
PHOSPHATE SERPL-MCNC: 3.4 MG/DL (ref 2.5–4.5)
PHOSPHATE SERPL-MCNC: 3.8 MG/DL (ref 2.5–4.5)
PHOSPHATE SERPL-MCNC: 4.1 MG/DL (ref 2.5–4.5)
PHOSPHATE SERPL-MCNC: 4.3 MG/DL (ref 2.5–4.5)
PHOSPHATE SERPL-MCNC: 5.1 MG/DL (ref 2.5–4.5)
PHOSPHATE SERPL-MCNC: 5.6 MG/DL (ref 2.5–4.5)
PHOSPHATE SERPL-MCNC: 5.6 MG/DL (ref 2.5–4.5)
PHOSPHATE SERPL-MCNC: 5.7 MG/DL (ref 2.5–4.5)
PHOSPHATE SERPL-MCNC: 5.8 MG/DL (ref 2.5–4.5)
PLATELET # BLD AUTO: 101 K/UL (ref 164–446)
PLATELET # BLD AUTO: 102 K/UL (ref 164–446)
PLATELET # BLD AUTO: 103 K/UL (ref 164–446)
PLATELET # BLD AUTO: 113 K/UL (ref 164–446)
PLATELET # BLD AUTO: 123 K/UL (ref 164–446)
PLATELET # BLD AUTO: 125 K/UL (ref 164–446)
PLATELET # BLD AUTO: 126 K/UL (ref 164–446)
PLATELET # BLD AUTO: 134 K/UL (ref 164–446)
PLATELET # BLD AUTO: 139 K/UL (ref 164–446)
PLATELET # BLD AUTO: 141 K/UL (ref 164–446)
PLATELET # BLD AUTO: 142 K/UL (ref 164–446)
PLATELET # BLD AUTO: 144 K/UL (ref 164–446)
PLATELET # BLD AUTO: 146 K/UL (ref 164–446)
PLATELET # BLD AUTO: 148 K/UL (ref 164–446)
PLATELET # BLD AUTO: 148 K/UL (ref 164–446)
PLATELET # BLD AUTO: 150 K/UL (ref 164–446)
PLATELET # BLD AUTO: 152 K/UL (ref 164–446)
PLATELET # BLD AUTO: 155 K/UL (ref 164–446)
PLATELET # BLD AUTO: 165 K/UL (ref 164–446)
PLATELET # BLD AUTO: 170 K/UL (ref 164–446)
PLATELET # BLD AUTO: 179 K/UL (ref 164–446)
PLATELET # BLD AUTO: 182 K/UL (ref 164–446)
PLATELET # BLD AUTO: 187 K/UL (ref 164–446)
PLATELET # BLD AUTO: 189 K/UL (ref 164–446)
PLATELET # BLD AUTO: 189 K/UL (ref 164–446)
PLATELET # BLD AUTO: 194 K/UL (ref 164–446)
PLATELET # BLD AUTO: 194 K/UL (ref 164–446)
PLATELET # BLD AUTO: 195 K/UL (ref 164–446)
PLATELET # BLD AUTO: 197 K/UL (ref 164–446)
PLATELET # BLD AUTO: 200 K/UL (ref 164–446)
PLATELET # BLD AUTO: 202 K/UL (ref 164–446)
PLATELET # BLD AUTO: 205 K/UL (ref 164–446)
PLATELET # BLD AUTO: 205 K/UL (ref 164–446)
PLATELET # BLD AUTO: 208 K/UL (ref 164–446)
PLATELET # BLD AUTO: 210 K/UL (ref 164–446)
PLATELET # BLD AUTO: 211 K/UL (ref 164–446)
PLATELET # BLD AUTO: 212 K/UL (ref 164–446)
PLATELET # BLD AUTO: 213 K/UL (ref 164–446)
PLATELET # BLD AUTO: 219 K/UL (ref 164–446)
PLATELET # BLD AUTO: 227 K/UL (ref 164–446)
PLATELET # BLD AUTO: 230 K/UL (ref 164–446)
PLATELET # BLD AUTO: 234 K/UL (ref 164–446)
PLATELET # BLD AUTO: 234 K/UL (ref 164–446)
PLATELET # BLD AUTO: 235 K/UL (ref 164–446)
PLATELET # BLD AUTO: 236 K/UL (ref 164–446)
PLATELET # BLD AUTO: 238 K/UL (ref 164–446)
PLATELET # BLD AUTO: 240 K/UL (ref 164–446)
PLATELET # BLD AUTO: 241 K/UL (ref 164–446)
PLATELET # BLD AUTO: 247 K/UL (ref 164–446)
PLATELET # BLD AUTO: 249 K/UL (ref 164–446)
PLATELET # BLD AUTO: 255 K/UL (ref 164–446)
PLATELET # BLD AUTO: 255 K/UL (ref 164–446)
PLATELET # BLD AUTO: 258 K/UL (ref 164–446)
PLATELET # BLD AUTO: 261 K/UL (ref 164–446)
PLATELET # BLD AUTO: 265 K/UL (ref 164–446)
PLATELET # BLD AUTO: 265 K/UL (ref 164–446)
PLATELET # BLD AUTO: 266 K/UL (ref 164–446)
PLATELET # BLD AUTO: 269 K/UL (ref 164–446)
PLATELET # BLD AUTO: 289 K/UL (ref 164–446)
PLATELET # BLD AUTO: 292 K/UL (ref 164–446)
PLATELET # BLD AUTO: 301 K/UL (ref 164–446)
PLATELET # BLD AUTO: 307 K/UL (ref 164–446)
PLATELET # BLD AUTO: 319 K/UL (ref 164–446)
PLATELET # BLD AUTO: 340 K/UL (ref 164–446)
PLATELET # BLD AUTO: 344 K/UL (ref 164–446)
PLATELET # BLD AUTO: 345 K/UL (ref 164–446)
PLATELET # BLD AUTO: 348 K/UL (ref 164–446)
PLATELET # BLD AUTO: 356 K/UL (ref 164–446)
PLATELET # BLD AUTO: 85 K/UL (ref 164–446)
PLATELET # BLD AUTO: 87 K/UL (ref 164–446)
PLATELET # BLD AUTO: 93 K/UL (ref 164–446)
PLATELET # BLD AUTO: 94 K/UL (ref 164–446)
PLATELET BLD QL SMEAR: NORMAL
PLATELETS.RETICULATED NFR BLD AUTO: 3.2 % (ref 0.6–13.1)
PMV BLD AUTO: 10 FL (ref 9–12.9)
PMV BLD AUTO: 10.1 FL (ref 9–12.9)
PMV BLD AUTO: 10.2 FL (ref 9–12.9)
PMV BLD AUTO: 10.3 FL (ref 9–12.9)
PMV BLD AUTO: 10.3 FL (ref 9–12.9)
PMV BLD AUTO: 10.4 FL (ref 9–12.9)
PMV BLD AUTO: 10.5 FL (ref 9–12.9)
PMV BLD AUTO: 10.5 FL (ref 9–12.9)
PMV BLD AUTO: 10.6 FL (ref 9–12.9)
PMV BLD AUTO: 10.7 FL (ref 9–12.9)
PMV BLD AUTO: 10.8 FL (ref 9–12.9)
PMV BLD AUTO: 10.9 FL (ref 9–12.9)
PMV BLD AUTO: 11.1 FL (ref 9–12.9)
PMV BLD AUTO: 11.1 FL (ref 9–12.9)
PMV BLD AUTO: 11.2 FL (ref 9–12.9)
PMV BLD AUTO: 11.2 FL (ref 9–12.9)
PMV BLD AUTO: 11.3 FL (ref 9–12.9)
PMV BLD AUTO: 11.4 FL (ref 9–12.9)
PMV BLD AUTO: 8.5 FL (ref 9–12.9)
PMV BLD AUTO: 8.6 FL (ref 9–12.9)
PMV BLD AUTO: 8.7 FL (ref 9–12.9)
PMV BLD AUTO: 8.8 FL (ref 9–12.9)
PMV BLD AUTO: 8.9 FL (ref 9–12.9)
PMV BLD AUTO: 8.9 FL (ref 9–12.9)
PMV BLD AUTO: 9 FL (ref 9–12.9)
PMV BLD AUTO: 9.1 FL (ref 9–12.9)
PMV BLD AUTO: 9.1 FL (ref 9–12.9)
PMV BLD AUTO: 9.2 FL (ref 9–12.9)
PMV BLD AUTO: 9.2 FL (ref 9–12.9)
PMV BLD AUTO: 9.3 FL (ref 9–12.9)
PMV BLD AUTO: 9.3 FL (ref 9–12.9)
PMV BLD AUTO: 9.4 FL (ref 9–12.9)
PMV BLD AUTO: 9.5 FL (ref 9–12.9)
PMV BLD AUTO: 9.6 FL (ref 9–12.9)
PMV BLD AUTO: 9.7 FL (ref 9–12.9)
PMV BLD AUTO: 9.8 FL (ref 9–12.9)
PMV BLD AUTO: 9.9 FL (ref 9–12.9)
PO2 BLDA: 96.7 MMHG (ref 64–87)
PO2 BLDV: 34.6 MMHG (ref 25–40)
PO2 BLDV: 42 MMHG (ref 25–40)
PO2 TEMP ADJ BLDA: 102.1 MMHG (ref 64–87)
PO2 TEMP ADJ BLDV: 35.6 MMHG (ref 25–40)
PO2 TEMP ADJ BLDV: 41.4 MMHG (ref 25–40)
POIKILOCYTOSIS BLD QL SMEAR: NORMAL
POLYCHROMASIA BLD QL SMEAR: NORMAL
POTASSIUM SERPL-SCNC: 2.8 MMOL/L (ref 3.6–5.5)
POTASSIUM SERPL-SCNC: 3.2 MMOL/L (ref 3.6–5.5)
POTASSIUM SERPL-SCNC: 3.3 MMOL/L (ref 3.6–5.5)
POTASSIUM SERPL-SCNC: 3.4 MMOL/L (ref 3.6–5.5)
POTASSIUM SERPL-SCNC: 3.5 MMOL/L (ref 3.6–5.5)
POTASSIUM SERPL-SCNC: 3.5 MMOL/L (ref 3.6–5.5)
POTASSIUM SERPL-SCNC: 3.6 MMOL/L (ref 3.6–5.5)
POTASSIUM SERPL-SCNC: 3.7 MMOL/L (ref 3.6–5.5)
POTASSIUM SERPL-SCNC: 3.7 MMOL/L (ref 3.6–5.5)
POTASSIUM SERPL-SCNC: 3.8 MMOL/L (ref 3.6–5.5)
POTASSIUM SERPL-SCNC: 3.9 MMOL/L (ref 3.6–5.5)
POTASSIUM SERPL-SCNC: 4 MMOL/L (ref 3.6–5.5)
POTASSIUM SERPL-SCNC: 4 MMOL/L (ref 3.6–5.5)
POTASSIUM SERPL-SCNC: 4.1 MMOL/L (ref 3.6–5.5)
POTASSIUM SERPL-SCNC: 4.2 MMOL/L (ref 3.6–5.5)
POTASSIUM SERPL-SCNC: 4.3 MMOL/L (ref 3.6–5.5)
POTASSIUM SERPL-SCNC: 4.4 MMOL/L (ref 3.6–5.5)
POTASSIUM SERPL-SCNC: 4.5 MMOL/L (ref 3.6–5.5)
POTASSIUM SERPL-SCNC: 4.6 MMOL/L (ref 3.6–5.5)
POTASSIUM SERPL-SCNC: 4.7 MMOL/L (ref 3.6–5.5)
POTASSIUM SERPL-SCNC: 4.9 MMOL/L (ref 3.6–5.5)
POTASSIUM SERPL-SCNC: 5 MMOL/L (ref 3.6–5.5)
POTASSIUM SERPL-SCNC: 5.1 MMOL/L (ref 3.6–5.5)
POTASSIUM SERPL-SCNC: 5.2 MMOL/L (ref 3.6–5.5)
POTASSIUM SERPL-SCNC: 5.4 MMOL/L (ref 3.6–5.5)
POTASSIUM SERPL-SCNC: 5.6 MMOL/L (ref 3.6–5.5)
POTASSIUM SERPL-SCNC: 5.7 MMOL/L (ref 3.6–5.5)
POTASSIUM SERPL-SCNC: 5.8 MMOL/L (ref 3.6–5.5)
POTASSIUM SERPL-SCNC: 5.9 MMOL/L (ref 3.6–5.5)
POTASSIUM SERPL-SCNC: 6.2 MMOL/L (ref 3.6–5.5)
POTASSIUM SERPL-SCNC: 6.2 MMOL/L (ref 3.6–5.5)
PREALB SERPL-MCNC: 3.1 MG/DL (ref 18–38)
PROCALCITONIN SERPL-MCNC: 1.2 NG/ML
PROCALCITONIN SERPL-MCNC: 1.42 NG/ML
PROCALCITONIN SERPL-MCNC: 10.3 NG/ML
PROCALCITONIN SERPL-MCNC: 2.55 NG/ML
PROCALCITONIN SERPL-MCNC: 7.68 NG/ML
PROCALCITONIN SERPL-MCNC: 9.39 NG/ML
PROCALCITONIN SERPL-MCNC: 98.5 NG/ML
PROMYELOCYTES NFR BLD MANUAL: 1.7 %
PROPOXYPH UR QL SCN: NEGATIVE
PROPOXYPH UR QL SCN: NEGATIVE
PROT SERPL-MCNC: 5.1 G/DL (ref 6–8.2)
PROT SERPL-MCNC: 5.3 G/DL (ref 6–8.2)
PROT SERPL-MCNC: 5.4 G/DL (ref 6–8.2)
PROT SERPL-MCNC: 5.5 G/DL (ref 6–8.2)
PROT SERPL-MCNC: 5.6 G/DL (ref 6–8.2)
PROT SERPL-MCNC: 5.6 G/DL (ref 6–8.2)
PROT SERPL-MCNC: 5.7 G/DL (ref 6–8.2)
PROT SERPL-MCNC: 5.7 G/DL (ref 6–8.2)
PROT SERPL-MCNC: 5.8 G/DL (ref 6–8.2)
PROT SERPL-MCNC: 5.9 G/DL (ref 6–8.2)
PROT SERPL-MCNC: 5.9 G/DL (ref 6–8.2)
PROT SERPL-MCNC: 6 G/DL (ref 6–8.2)
PROT SERPL-MCNC: 6.1 G/DL (ref 6–8.2)
PROT SERPL-MCNC: 6.2 G/DL (ref 6–8.2)
PROT SERPL-MCNC: 6.3 G/DL (ref 6–8.2)
PROT SERPL-MCNC: 6.4 G/DL (ref 6–8.2)
PROT SERPL-MCNC: 6.4 G/DL (ref 6–8.2)
PROT SERPL-MCNC: 6.5 G/DL (ref 6–8.2)
PROT SERPL-MCNC: 6.6 G/DL (ref 6–8.2)
PROT SERPL-MCNC: 6.7 G/DL (ref 6–8.2)
PROT SERPL-MCNC: 6.9 G/DL (ref 6–8.2)
PROT SERPL-MCNC: 7 G/DL (ref 6–8.2)
PROT SERPL-MCNC: 7.4 G/DL (ref 6–8.2)
PROT SERPL-MCNC: 7.8 G/DL (ref 6–8.2)
PROT SERPL-MCNC: 7.8 G/DL (ref 6–8.2)
PROT UR QL STRIP: 100 MG/DL
PROT UR QL STRIP: 100 MG/DL
PROT UR QL STRIP: NEGATIVE MG/DL
PROTHROMBIN TIME: 15.9 SEC (ref 12–14.6)
PROTHROMBIN TIME: 16 SEC (ref 12–14.6)
PROTHROMBIN TIME: 16.1 SEC (ref 12–14.6)
PROTHROMBIN TIME: 16.4 SEC (ref 12–14.6)
PROTHROMBIN TIME: 20.3 SEC (ref 12–14.6)
PROTHROMBIN TIME: 21.1 SEC (ref 12–14.6)
PROTHROMBIN TIME: 26.4 SEC (ref 12–14.6)
PROTHROMBIN TIME: 37.1 SEC (ref 12–14.6)
RBC # BLD AUTO: 2.79 M/UL (ref 4.7–6.1)
RBC # BLD AUTO: 2.83 M/UL (ref 4.7–6.1)
RBC # BLD AUTO: 2.9 M/UL (ref 4.7–6.1)
RBC # BLD AUTO: 2.92 M/UL (ref 4.7–6.1)
RBC # BLD AUTO: 2.95 M/UL (ref 4.7–6.1)
RBC # BLD AUTO: 3.01 M/UL (ref 4.7–6.1)
RBC # BLD AUTO: 3.02 M/UL (ref 4.7–6.1)
RBC # BLD AUTO: 3.03 M/UL (ref 4.7–6.1)
RBC # BLD AUTO: 3.05 M/UL (ref 4.7–6.1)
RBC # BLD AUTO: 3.1 M/UL (ref 4.7–6.1)
RBC # BLD AUTO: 3.13 M/UL (ref 4.7–6.1)
RBC # BLD AUTO: 3.13 M/UL (ref 4.7–6.1)
RBC # BLD AUTO: 3.14 M/UL (ref 4.7–6.1)
RBC # BLD AUTO: 3.14 M/UL (ref 4.7–6.1)
RBC # BLD AUTO: 3.15 M/UL (ref 4.7–6.1)
RBC # BLD AUTO: 3.16 M/UL (ref 4.7–6.1)
RBC # BLD AUTO: 3.17 M/UL (ref 4.7–6.1)
RBC # BLD AUTO: 3.17 M/UL (ref 4.7–6.1)
RBC # BLD AUTO: 3.19 M/UL (ref 4.7–6.1)
RBC # BLD AUTO: 3.21 M/UL (ref 4.7–6.1)
RBC # BLD AUTO: 3.22 M/UL (ref 4.7–6.1)
RBC # BLD AUTO: 3.22 M/UL (ref 4.7–6.1)
RBC # BLD AUTO: 3.24 M/UL (ref 4.7–6.1)
RBC # BLD AUTO: 3.25 M/UL (ref 4.7–6.1)
RBC # BLD AUTO: 3.25 M/UL (ref 4.7–6.1)
RBC # BLD AUTO: 3.26 M/UL (ref 4.7–6.1)
RBC # BLD AUTO: 3.27 M/UL (ref 4.7–6.1)
RBC # BLD AUTO: 3.27 M/UL (ref 4.7–6.1)
RBC # BLD AUTO: 3.29 M/UL (ref 4.7–6.1)
RBC # BLD AUTO: 3.35 M/UL (ref 4.7–6.1)
RBC # BLD AUTO: 3.37 M/UL (ref 4.7–6.1)
RBC # BLD AUTO: 3.39 M/UL (ref 4.7–6.1)
RBC # BLD AUTO: 3.4 M/UL (ref 4.7–6.1)
RBC # BLD AUTO: 3.41 M/UL (ref 4.7–6.1)
RBC # BLD AUTO: 3.42 M/UL (ref 4.7–6.1)
RBC # BLD AUTO: 3.42 M/UL (ref 4.7–6.1)
RBC # BLD AUTO: 3.43 M/UL (ref 4.7–6.1)
RBC # BLD AUTO: 3.45 M/UL (ref 4.7–6.1)
RBC # BLD AUTO: 3.46 M/UL (ref 4.7–6.1)
RBC # BLD AUTO: 3.46 M/UL (ref 4.7–6.1)
RBC # BLD AUTO: 3.48 M/UL (ref 4.7–6.1)
RBC # BLD AUTO: 3.49 M/UL (ref 4.7–6.1)
RBC # BLD AUTO: 3.49 M/UL (ref 4.7–6.1)
RBC # BLD AUTO: 3.51 M/UL (ref 4.7–6.1)
RBC # BLD AUTO: 3.51 M/UL (ref 4.7–6.1)
RBC # BLD AUTO: 3.52 M/UL (ref 4.7–6.1)
RBC # BLD AUTO: 3.53 M/UL (ref 4.7–6.1)
RBC # BLD AUTO: 3.54 M/UL (ref 4.7–6.1)
RBC # BLD AUTO: 3.54 M/UL (ref 4.7–6.1)
RBC # BLD AUTO: 3.55 M/UL (ref 4.7–6.1)
RBC # BLD AUTO: 3.58 M/UL (ref 4.7–6.1)
RBC # BLD AUTO: 3.59 M/UL (ref 4.7–6.1)
RBC # BLD AUTO: 3.61 M/UL (ref 4.7–6.1)
RBC # BLD AUTO: 3.65 M/UL (ref 4.7–6.1)
RBC # BLD AUTO: 3.67 M/UL (ref 4.7–6.1)
RBC # BLD AUTO: 3.77 M/UL (ref 4.7–6.1)
RBC # BLD AUTO: 3.83 M/UL (ref 4.7–6.1)
RBC # BLD AUTO: 3.89 M/UL (ref 4.7–6.1)
RBC # BLD AUTO: 3.97 M/UL (ref 4.7–6.1)
RBC # BLD AUTO: 4.01 M/UL (ref 4.7–6.1)
RBC # BLD AUTO: 4.03 M/UL (ref 4.7–6.1)
RBC # BLD AUTO: 4.11 M/UL (ref 4.7–6.1)
RBC # BLD AUTO: 4.12 M/UL (ref 4.7–6.1)
RBC # BLD AUTO: 4.13 M/UL (ref 4.7–6.1)
RBC # BLD AUTO: 4.52 M/UL (ref 4.7–6.1)
RBC # URNS HPF: ABNORMAL /HPF
RBC BLD AUTO: NORMAL
RBC BLD AUTO: PRESENT
RBC UR QL AUTO: ABNORMAL
RBC UR QL AUTO: NEGATIVE
RETICS # AUTO: 0.07 M/UL (ref 0.04–0.12)
RETICS # AUTO: 0.08 M/UL (ref 0.04–0.06)
RETICS/RBC NFR: 2 % (ref 0.8–2.6)
RETICS/RBC NFR: 2.4 % (ref 0.8–2.1)
RH BLD: NORMAL
RHEUMATOID FACT SER IA-ACNC: 244 IU/ML (ref 0–14)
RHODAMINE-AURAMINE STN SPEC: NORMAL
RSV RNA SPEC QL NAA+PROBE: NEGATIVE
S PYO AG THROAT QL: NORMAL
SALICYLATES SERPL-MCNC: <1 MG/DL (ref 15–25)
SAO2 % BLDA: 96.2 % (ref 93–99)
SAO2 % BLDV: 58.8 %
SAO2 % BLDV: 68.3 %
SARS-COV-2 RNA RESP QL NAA+PROBE: NOTDETECTED
SCCMEC + MECA PNL NOSE NAA+PROBE: NEGATIVE
SCCMEC + MECA PNL NOSE NAA+PROBE: POSITIVE
SIGNIFICANT IND 70042: ABNORMAL
SIGNIFICANT IND 70042: NORMAL
SITE SITE: ABNORMAL
SITE SITE: NORMAL
SMA IGG SER-ACNC: 11 UNITS (ref 0–19)
SMUDGE CELLS BLD QL SMEAR: NORMAL
SODIUM SERPL-SCNC: 119 MMOL/L (ref 135–145)
SODIUM SERPL-SCNC: 119 MMOL/L (ref 135–145)
SODIUM SERPL-SCNC: 120 MMOL/L (ref 135–145)
SODIUM SERPL-SCNC: 122 MMOL/L (ref 135–145)
SODIUM SERPL-SCNC: 123 MMOL/L (ref 135–145)
SODIUM SERPL-SCNC: 124 MMOL/L (ref 135–145)
SODIUM SERPL-SCNC: 125 MMOL/L (ref 135–145)
SODIUM SERPL-SCNC: 126 MMOL/L (ref 135–145)
SODIUM SERPL-SCNC: 127 MMOL/L (ref 135–145)
SODIUM SERPL-SCNC: 128 MMOL/L (ref 135–145)
SODIUM SERPL-SCNC: 129 MMOL/L (ref 135–145)
SODIUM SERPL-SCNC: 130 MMOL/L (ref 135–145)
SODIUM SERPL-SCNC: 131 MMOL/L (ref 135–145)
SODIUM SERPL-SCNC: 132 MMOL/L (ref 135–145)
SODIUM SERPL-SCNC: 133 MMOL/L (ref 135–145)
SODIUM SERPL-SCNC: 134 MMOL/L (ref 135–145)
SODIUM SERPL-SCNC: 135 MMOL/L (ref 135–145)
SODIUM SERPL-SCNC: 136 MMOL/L (ref 135–145)
SODIUM SERPL-SCNC: 137 MMOL/L (ref 135–145)
SODIUM SERPL-SCNC: 138 MMOL/L (ref 135–145)
SODIUM SERPL-SCNC: 139 MMOL/L (ref 135–145)
SODIUM SERPL-SCNC: 141 MMOL/L (ref 135–145)
SODIUM UR-SCNC: 34 MMOL/L
SODIUM UR-SCNC: 95 MMOL/L
SODIUM UR-SCNC: <20 MMOL/L
SODIUM UR-SCNC: <20 MMOL/L
SOURCE SOURCE: ABNORMAL
SOURCE SOURCE: NORMAL
SP GR UR STRIP.AUTO: 1.01
SP GR UR STRIP.AUTO: 1.01
SP GR UR STRIP.AUTO: 1.02
SP GR UR STRIP.AUTO: 1.03
SP GR UR STRIP.AUTO: 1.03
SPECIMEN SOURCE: NORMAL
T4 FREE SERPL-MCNC: 0.76 NG/DL (ref 0.93–1.7)
T4 FREE SERPL-MCNC: 0.95 NG/DL (ref 0.93–1.7)
T4 FREE SERPL-MCNC: 1.33 NG/DL (ref 0.93–1.7)
TIBC SERPL-MCNC: 163 UG/DL (ref 250–450)
TIBC SERPL-MCNC: 249 UG/DL (ref 250–450)
TIBC SERPL-MCNC: 255 UG/DL (ref 250–450)
TIBC SERPL-MCNC: 259 UG/DL (ref 250–450)
TOXIC GRANULES BLD QL SMEAR: NORMAL
TOXIC GRANULES BLD QL SMEAR: SLIGHT
TRIGL SERPL-MCNC: 66 MG/DL (ref 0–149)
TROPONIN T SERPL-MCNC: 24 NG/L (ref 6–19)
TROPONIN T SERPL-MCNC: 27 NG/L (ref 6–19)
TROPONIN T SERPL-MCNC: 28 NG/L (ref 6–19)
TROPONIN T SERPL-MCNC: 30 NG/L (ref 6–19)
TROPONIN T SERPL-MCNC: 30 NG/L (ref 6–19)
TROPONIN T SERPL-MCNC: 34 NG/L (ref 6–19)
TROPONIN T SERPL-MCNC: 35 NG/L (ref 6–19)
TSH SERPL DL<=0.005 MIU/L-ACNC: 2.81 UIU/ML (ref 0.38–5.33)
TSH SERPL DL<=0.005 MIU/L-ACNC: 2.94 UIU/ML (ref 0.38–5.33)
TSH SERPL DL<=0.005 MIU/L-ACNC: 5.63 UIU/ML (ref 0.38–5.33)
TSH SERPL DL<=0.005 MIU/L-ACNC: 8.39 UIU/ML (ref 0.38–5.33)
TSH SERPL DL<=0.005 MIU/L-ACNC: 9.28 UIU/ML (ref 0.38–5.33)
UFH PPP CHRO-ACNC: 0.17 IU/ML
UFH PPP CHRO-ACNC: 0.33 IU/ML
UFH PPP CHRO-ACNC: <0.1 IU/ML
UIBC SERPL-MCNC: 127 UG/DL (ref 110–370)
UIBC SERPL-MCNC: 219 UG/DL (ref 110–370)
UIBC SERPL-MCNC: 227 UG/DL (ref 110–370)
UIBC SERPL-MCNC: 228 UG/DL (ref 110–370)
UROBILINOGEN UR STRIP.AUTO-MCNC: 0.2 MG/DL
UROBILINOGEN UR STRIP.AUTO-MCNC: 1 MG/DL
UROBILINOGEN UR STRIP.AUTO-MCNC: 1 MG/DL
VIT B12 SERPL-MCNC: 326 PG/ML (ref 211–911)
VIT B12 SERPL-MCNC: 666 PG/ML (ref 211–911)
WBC # BLD AUTO: 10.1 K/UL (ref 4.8–10.8)
WBC # BLD AUTO: 10.3 K/UL (ref 4.8–10.8)
WBC # BLD AUTO: 10.3 K/UL (ref 4.8–10.8)
WBC # BLD AUTO: 10.6 K/UL (ref 4.8–10.8)
WBC # BLD AUTO: 10.7 K/UL (ref 4.8–10.8)
WBC # BLD AUTO: 10.9 K/UL (ref 4.8–10.8)
WBC # BLD AUTO: 11.2 K/UL (ref 4.8–10.8)
WBC # BLD AUTO: 11.3 K/UL (ref 4.8–10.8)
WBC # BLD AUTO: 11.5 K/UL (ref 4.8–10.8)
WBC # BLD AUTO: 12.1 K/UL (ref 4.8–10.8)
WBC # BLD AUTO: 12.2 K/UL (ref 4.8–10.8)
WBC # BLD AUTO: 12.9 K/UL (ref 4.8–10.8)
WBC # BLD AUTO: 13.5 K/UL (ref 4.8–10.8)
WBC # BLD AUTO: 14 K/UL (ref 4.8–10.8)
WBC # BLD AUTO: 15.4 K/UL (ref 4.8–10.8)
WBC # BLD AUTO: 15.6 K/UL (ref 4.8–10.8)
WBC # BLD AUTO: 15.7 K/UL (ref 4.8–10.8)
WBC # BLD AUTO: 15.9 K/UL (ref 4.8–10.8)
WBC # BLD AUTO: 17.3 K/UL (ref 4.8–10.8)
WBC # BLD AUTO: 17.6 K/UL (ref 4.8–10.8)
WBC # BLD AUTO: 17.8 K/UL (ref 4.8–10.8)
WBC # BLD AUTO: 19.1 K/UL (ref 4.8–10.8)
WBC # BLD AUTO: 20.8 K/UL (ref 4.8–10.8)
WBC # BLD AUTO: 21.6 K/UL (ref 4.8–10.8)
WBC # BLD AUTO: 29.7 K/UL (ref 4.8–10.8)
WBC # BLD AUTO: 3.7 K/UL (ref 4.8–10.8)
WBC # BLD AUTO: 32.9 K/UL (ref 4.8–10.8)
WBC # BLD AUTO: 34.9 K/UL (ref 4.8–10.8)
WBC # BLD AUTO: 36.2 K/UL (ref 4.8–10.8)
WBC # BLD AUTO: 4.8 K/UL (ref 4.8–10.8)
WBC # BLD AUTO: 5 K/UL (ref 4.8–10.8)
WBC # BLD AUTO: 5.3 K/UL (ref 4.8–10.8)
WBC # BLD AUTO: 5.5 K/UL (ref 4.8–10.8)
WBC # BLD AUTO: 5.5 K/UL (ref 4.8–10.8)
WBC # BLD AUTO: 5.7 K/UL (ref 4.8–10.8)
WBC # BLD AUTO: 5.8 K/UL (ref 4.8–10.8)
WBC # BLD AUTO: 5.9 K/UL (ref 4.8–10.8)
WBC # BLD AUTO: 6.2 K/UL (ref 4.8–10.8)
WBC # BLD AUTO: 6.3 K/UL (ref 4.8–10.8)
WBC # BLD AUTO: 6.4 K/UL (ref 4.8–10.8)
WBC # BLD AUTO: 6.6 K/UL (ref 4.8–10.8)
WBC # BLD AUTO: 6.7 K/UL (ref 4.8–10.8)
WBC # BLD AUTO: 7.2 K/UL (ref 4.8–10.8)
WBC # BLD AUTO: 7.5 K/UL (ref 4.8–10.8)
WBC # BLD AUTO: 7.7 K/UL (ref 4.8–10.8)
WBC # BLD AUTO: 7.8 K/UL (ref 4.8–10.8)
WBC # BLD AUTO: 7.9 K/UL (ref 4.8–10.8)
WBC # BLD AUTO: 8 K/UL (ref 4.8–10.8)
WBC # BLD AUTO: 8.2 K/UL (ref 4.8–10.8)
WBC # BLD AUTO: 8.2 K/UL (ref 4.8–10.8)
WBC # BLD AUTO: 8.3 K/UL (ref 4.8–10.8)
WBC # BLD AUTO: 8.3 K/UL (ref 4.8–10.8)
WBC # BLD AUTO: 8.4 K/UL (ref 4.8–10.8)
WBC # BLD AUTO: 8.4 K/UL (ref 4.8–10.8)
WBC # BLD AUTO: 8.6 K/UL (ref 4.8–10.8)
WBC # BLD AUTO: 8.6 K/UL (ref 4.8–10.8)
WBC # BLD AUTO: 8.7 K/UL (ref 4.8–10.8)
WBC # BLD AUTO: 8.9 K/UL (ref 4.8–10.8)
WBC # BLD AUTO: 9.1 K/UL (ref 4.8–10.8)
WBC # BLD AUTO: 9.2 K/UL (ref 4.8–10.8)
WBC # BLD AUTO: 9.3 K/UL (ref 4.8–10.8)
WBC # BLD AUTO: 9.4 K/UL (ref 4.8–10.8)
WBC # BLD AUTO: 9.4 K/UL (ref 4.8–10.8)
WBC # BLD AUTO: 9.5 K/UL (ref 4.8–10.8)
WBC # BLD AUTO: 9.5 K/UL (ref 4.8–10.8)
WBC # BLD AUTO: 9.6 K/UL (ref 4.8–10.8)
WBC # BLD AUTO: 9.9 K/UL (ref 4.8–10.8)
WBC # BLD AUTO: 9.9 K/UL (ref 4.8–10.8)
WBC #/AREA URNS HPF: ABNORMAL /HPF
YEAST BUDDING URNS QL: PRESENT /HPF
YEAST BUDDING URNS QL: PRESENT /HPF

## 2023-01-01 PROCEDURE — A9270 NON-COVERED ITEM OR SERVICE: HCPCS | Performed by: HOSPITALIST

## 2023-01-01 PROCEDURE — 97530 THERAPEUTIC ACTIVITIES: CPT

## 2023-01-01 PROCEDURE — 700102 HCHG RX REV CODE 250 W/ 637 OVERRIDE(OP): Performed by: HOSPITALIST

## 2023-01-01 PROCEDURE — 94640 AIRWAY INHALATION TREATMENT: CPT

## 2023-01-01 PROCEDURE — 97761 PROSTHETIC TRAING 1ST ENC: CPT

## 2023-01-01 PROCEDURE — 99239 HOSP IP/OBS DSCHRG MGMT >30: CPT | Performed by: PHYSICAL MEDICINE & REHABILITATION

## 2023-01-01 PROCEDURE — A9270 NON-COVERED ITEM OR SERVICE: HCPCS | Performed by: PHYSICAL MEDICINE & REHABILITATION

## 2023-01-01 PROCEDURE — A9270 NON-COVERED ITEM OR SERVICE: HCPCS | Performed by: INTERNAL MEDICINE

## 2023-01-01 PROCEDURE — 700111 HCHG RX REV CODE 636 W/ 250 OVERRIDE (IP)

## 2023-01-01 PROCEDURE — 700101 HCHG RX REV CODE 250: Performed by: PHYSICAL MEDICINE & REHABILITATION

## 2023-01-01 PROCEDURE — 81003 URINALYSIS AUTO W/O SCOPE: CPT

## 2023-01-01 PROCEDURE — 99232 SBSQ HOSP IP/OBS MODERATE 35: CPT | Performed by: INTERNAL MEDICINE

## 2023-01-01 PROCEDURE — 85025 COMPLETE CBC W/AUTO DIFF WBC: CPT

## 2023-01-01 PROCEDURE — 700102 HCHG RX REV CODE 250 W/ 637 OVERRIDE(OP): Performed by: INTERNAL MEDICINE

## 2023-01-01 PROCEDURE — 700102 HCHG RX REV CODE 250 W/ 637 OVERRIDE(OP): Performed by: PHYSICAL MEDICINE & REHABILITATION

## 2023-01-01 PROCEDURE — 700102 HCHG RX REV CODE 250 W/ 637 OVERRIDE(OP): Performed by: STUDENT IN AN ORGANIZED HEALTH CARE EDUCATION/TRAINING PROGRAM

## 2023-01-01 PROCEDURE — 80048 BASIC METABOLIC PNL TOTAL CA: CPT

## 2023-01-01 PROCEDURE — 97110 THERAPEUTIC EXERCISES: CPT | Mod: CO

## 2023-01-01 PROCEDURE — 0241U HCHG SARS-COV-2 COVID-19 NFCT DS RESP RNA 4 TRGT ED POC: CPT

## 2023-01-01 PROCEDURE — 700105 HCHG RX REV CODE 258: Performed by: INTERNAL MEDICINE

## 2023-01-01 PROCEDURE — 97602 WOUND(S) CARE NON-SELECTIVE: CPT

## 2023-01-01 PROCEDURE — 700105 HCHG RX REV CODE 258: Performed by: HOSPITALIST

## 2023-01-01 PROCEDURE — 84145 PROCALCITONIN (PCT): CPT

## 2023-01-01 PROCEDURE — A9270 NON-COVERED ITEM OR SERVICE: HCPCS | Performed by: EMERGENCY MEDICINE

## 2023-01-01 PROCEDURE — 99222 1ST HOSP IP/OBS MODERATE 55: CPT | Performed by: STUDENT IN AN ORGANIZED HEALTH CARE EDUCATION/TRAINING PROGRAM

## 2023-01-01 PROCEDURE — A9270 NON-COVERED ITEM OR SERVICE: HCPCS | Performed by: STUDENT IN AN ORGANIZED HEALTH CARE EDUCATION/TRAINING PROGRAM

## 2023-01-01 PROCEDURE — 0SSH04Z REPOSITION RIGHT TARSAL JOINT WITH INTERNAL FIXATION DEVICE, OPEN APPROACH: ICD-10-PCS | Performed by: ORTHOPAEDIC SURGERY

## 2023-01-01 PROCEDURE — 99232 SBSQ HOSP IP/OBS MODERATE 35: CPT | Performed by: PHYSICAL MEDICINE & REHABILITATION

## 2023-01-01 PROCEDURE — 85610 PROTHROMBIN TIME: CPT

## 2023-01-01 PROCEDURE — 84132 ASSAY OF SERUM POTASSIUM: CPT | Mod: 91

## 2023-01-01 PROCEDURE — 99233 SBSQ HOSP IP/OBS HIGH 50: CPT | Performed by: INTERNAL MEDICINE

## 2023-01-01 PROCEDURE — 36415 COLL VENOUS BLD VENIPUNCTURE: CPT

## 2023-01-01 PROCEDURE — 84100 ASSAY OF PHOSPHORUS: CPT

## 2023-01-01 PROCEDURE — 85046 RETICYTE/HGB CONCENTRATE: CPT

## 2023-01-01 PROCEDURE — 770022 HCHG ROOM/CARE - ICU (200)

## 2023-01-01 PROCEDURE — 97112 NEUROMUSCULAR REEDUCATION: CPT

## 2023-01-01 PROCEDURE — 770001 HCHG ROOM/CARE - MED/SURG/GYN PRIV*

## 2023-01-01 PROCEDURE — 85652 RBC SED RATE AUTOMATED: CPT

## 2023-01-01 PROCEDURE — 82962 GLUCOSE BLOOD TEST: CPT | Mod: 91

## 2023-01-01 PROCEDURE — 87147 CULTURE TYPE IMMUNOLOGIC: CPT | Mod: 91

## 2023-01-01 PROCEDURE — A9270 NON-COVERED ITEM OR SERVICE: HCPCS | Performed by: NURSE PRACTITIONER

## 2023-01-01 PROCEDURE — 97110 THERAPEUTIC EXERCISES: CPT

## 2023-01-01 PROCEDURE — 85027 COMPLETE CBC AUTOMATED: CPT

## 2023-01-01 PROCEDURE — 700111 HCHG RX REV CODE 636 W/ 250 OVERRIDE (IP): Performed by: INTERNAL MEDICINE

## 2023-01-01 PROCEDURE — 99232 SBSQ HOSP IP/OBS MODERATE 35: CPT | Performed by: HOSPITALIST

## 2023-01-01 PROCEDURE — 700111 HCHG RX REV CODE 636 W/ 250 OVERRIDE (IP): Mod: JZ | Performed by: PHYSICAL MEDICINE & REHABILITATION

## 2023-01-01 PROCEDURE — 87070 CULTURE OTHR SPECIMN AEROBIC: CPT

## 2023-01-01 PROCEDURE — 770010 HCHG ROOM/CARE - REHAB SEMI PRIVAT*

## 2023-01-01 PROCEDURE — 700102 HCHG RX REV CODE 250 W/ 637 OVERRIDE(OP)

## 2023-01-01 PROCEDURE — 36573 INSJ PICC RS&I 5 YR+: CPT

## 2023-01-01 PROCEDURE — 99291 CRITICAL CARE FIRST HOUR: CPT | Performed by: INTERNAL MEDICINE

## 2023-01-01 PROCEDURE — 83550 IRON BINDING TEST: CPT

## 2023-01-01 PROCEDURE — 05HY33Z INSERTION OF INFUSION DEVICE INTO UPPER VEIN, PERCUTANEOUS APPROACH: ICD-10-PCS | Performed by: INTERNAL MEDICINE

## 2023-01-01 PROCEDURE — 700111 HCHG RX REV CODE 636 W/ 250 OVERRIDE (IP): Performed by: STUDENT IN AN ORGANIZED HEALTH CARE EDUCATION/TRAINING PROGRAM

## 2023-01-01 PROCEDURE — 82962 GLUCOSE BLOOD TEST: CPT

## 2023-01-01 PROCEDURE — 97535 SELF CARE MNGMENT TRAINING: CPT

## 2023-01-01 PROCEDURE — 27685 REVISION OF LOWER LEG TENDON: CPT | Mod: 79,RT | Performed by: ORTHOPAEDIC SURGERY

## 2023-01-01 PROCEDURE — 85520 HEPARIN ASSAY: CPT

## 2023-01-01 PROCEDURE — 99233 SBSQ HOSP IP/OBS HIGH 50: CPT | Performed by: HOSPITALIST

## 2023-01-01 PROCEDURE — 83880 ASSAY OF NATRIURETIC PEPTIDE: CPT

## 2023-01-01 PROCEDURE — 160036 HCHG PACU - EA ADDL 30 MINS PHASE I: Performed by: ORTHOPAEDIC SURGERY

## 2023-01-01 PROCEDURE — 99232 SBSQ HOSP IP/OBS MODERATE 35: CPT | Performed by: STUDENT IN AN ORGANIZED HEALTH CARE EDUCATION/TRAINING PROGRAM

## 2023-01-01 PROCEDURE — 85007 BL SMEAR W/DIFF WBC COUNT: CPT

## 2023-01-01 PROCEDURE — 87186 SC STD MICRODIL/AGAR DIL: CPT

## 2023-01-01 PROCEDURE — 96368 THER/DIAG CONCURRENT INF: CPT

## 2023-01-01 PROCEDURE — 770020 HCHG ROOM/CARE - TELE (206)

## 2023-01-01 PROCEDURE — 700105 HCHG RX REV CODE 258: Performed by: EMERGENCY MEDICINE

## 2023-01-01 PROCEDURE — 36556 INSERT NON-TUNNEL CV CATH: CPT

## 2023-01-01 PROCEDURE — 700105 HCHG RX REV CODE 258: Performed by: ANESTHESIOLOGY

## 2023-01-01 PROCEDURE — 97162 PT EVAL MOD COMPLEX 30 MIN: CPT

## 2023-01-01 PROCEDURE — 83735 ASSAY OF MAGNESIUM: CPT

## 2023-01-01 PROCEDURE — 700111 HCHG RX REV CODE 636 W/ 250 OVERRIDE (IP): Performed by: PHYSICAL MEDICINE & REHABILITATION

## 2023-01-01 PROCEDURE — 700111 HCHG RX REV CODE 636 W/ 250 OVERRIDE (IP): Performed by: ANESTHESIOLOGY

## 2023-01-01 PROCEDURE — 80053 COMPREHEN METABOLIC PANEL: CPT

## 2023-01-01 PROCEDURE — 700111 HCHG RX REV CODE 636 W/ 250 OVERRIDE (IP): Performed by: NURSE PRACTITIONER

## 2023-01-01 PROCEDURE — 83605 ASSAY OF LACTIC ACID: CPT

## 2023-01-01 PROCEDURE — 700111 HCHG RX REV CODE 636 W/ 250 OVERRIDE (IP): Mod: JZ | Performed by: INTERNAL MEDICINE

## 2023-01-01 PROCEDURE — 700111 HCHG RX REV CODE 636 W/ 250 OVERRIDE (IP): Mod: JZ | Performed by: EMERGENCY MEDICINE

## 2023-01-01 PROCEDURE — 99213 OFFICE O/P EST LOW 20 MIN: CPT | Performed by: PHYSICIAN ASSISTANT

## 2023-01-01 PROCEDURE — 160028 HCHG SURGERY MINUTES - 1ST 30 MINS LEVEL 3: Performed by: ORTHOPAEDIC SURGERY

## 2023-01-01 PROCEDURE — 86901 BLOOD TYPING SEROLOGIC RH(D): CPT

## 2023-01-01 PROCEDURE — 80053 COMPREHEN METABOLIC PANEL: CPT | Mod: 91

## 2023-01-01 PROCEDURE — 3074F SYST BP LT 130 MM HG: CPT | Performed by: STUDENT IN AN ORGANIZED HEALTH CARE EDUCATION/TRAINING PROGRAM

## 2023-01-01 PROCEDURE — 160009 HCHG ANES TIME/MIN: Performed by: ORTHOPAEDIC SURGERY

## 2023-01-01 PROCEDURE — A9270 NON-COVERED ITEM OR SERVICE: HCPCS | Performed by: ANESTHESIOLOGY

## 2023-01-01 PROCEDURE — 82040 ASSAY OF SERUM ALBUMIN: CPT

## 2023-01-01 PROCEDURE — 73620 X-RAY EXAM OF FOOT: CPT | Mod: RT

## 2023-01-01 PROCEDURE — 64447 NJX AA&/STRD FEMORAL NRV IMG: CPT | Performed by: ORTHOPAEDIC SURGERY

## 2023-01-01 PROCEDURE — 28715 ARTHRODESIS TRIPLE: CPT | Mod: 80ROC,79,RT | Performed by: STUDENT IN AN ORGANIZED HEALTH CARE EDUCATION/TRAINING PROGRAM

## 2023-01-01 PROCEDURE — 71045 X-RAY EXAM CHEST 1 VIEW: CPT

## 2023-01-01 PROCEDURE — A9270 NON-COVERED ITEM OR SERVICE: HCPCS

## 2023-01-01 PROCEDURE — 84439 ASSAY OF FREE THYROXINE: CPT

## 2023-01-01 PROCEDURE — 160048 HCHG OR STATISTICAL LEVEL 1-5: Performed by: ORTHOPAEDIC SURGERY

## 2023-01-01 PROCEDURE — 84443 ASSAY THYROID STIM HORMONE: CPT

## 2023-01-01 PROCEDURE — 84300 ASSAY OF URINE SODIUM: CPT

## 2023-01-01 PROCEDURE — 99233 SBSQ HOSP IP/OBS HIGH 50: CPT | Performed by: PHYSICAL MEDICINE & REHABILITATION

## 2023-01-01 PROCEDURE — 700102 HCHG RX REV CODE 250 W/ 637 OVERRIDE(OP): Mod: JZ | Performed by: INTERNAL MEDICINE

## 2023-01-01 PROCEDURE — 81001 URINALYSIS AUTO W/SCOPE: CPT

## 2023-01-01 PROCEDURE — 82306 VITAMIN D 25 HYDROXY: CPT

## 2023-01-01 PROCEDURE — 03HY32Z INSERTION OF MONITORING DEVICE INTO UPPER ARTERY, PERCUTANEOUS APPROACH: ICD-10-PCS | Performed by: INTERNAL MEDICINE

## 2023-01-01 PROCEDURE — 87641 MR-STAPH DNA AMP PROBE: CPT

## 2023-01-01 PROCEDURE — A9270 NON-COVERED ITEM OR SERVICE: HCPCS | Mod: JZ | Performed by: NURSE PRACTITIONER

## 2023-01-01 PROCEDURE — 87040 BLOOD CULTURE FOR BACTERIA: CPT

## 2023-01-01 PROCEDURE — C9399 UNCLASSIFIED DRUGS OR BIOLOG: HCPCS | Performed by: HOSPITALIST

## 2023-01-01 PROCEDURE — 83036 HEMOGLOBIN GLYCOSYLATED A1C: CPT

## 2023-01-01 PROCEDURE — 99231 SBSQ HOSP IP/OBS SF/LOW 25: CPT | Performed by: PHYSICAL MEDICINE & REHABILITATION

## 2023-01-01 PROCEDURE — 87205 SMEAR GRAM STAIN: CPT

## 2023-01-01 PROCEDURE — 700102 HCHG RX REV CODE 250 W/ 637 OVERRIDE(OP): Performed by: NURSE PRACTITIONER

## 2023-01-01 PROCEDURE — 87116 MYCOBACTERIA CULTURE: CPT | Mod: 91

## 2023-01-01 PROCEDURE — 87086 URINE CULTURE/COLONY COUNT: CPT

## 2023-01-01 PROCEDURE — 99291 CRITICAL CARE FIRST HOUR: CPT | Mod: 25 | Performed by: INTERNAL MEDICINE

## 2023-01-01 PROCEDURE — 93975 VASCULAR STUDY: CPT

## 2023-01-01 PROCEDURE — 700111 HCHG RX REV CODE 636 W/ 250 OVERRIDE (IP): Performed by: HOSPITALIST

## 2023-01-01 PROCEDURE — 93010 ELECTROCARDIOGRAM REPORT: CPT | Performed by: INTERNAL MEDICINE

## 2023-01-01 PROCEDURE — 700101 HCHG RX REV CODE 250: Performed by: STUDENT IN AN ORGANIZED HEALTH CARE EDUCATION/TRAINING PROGRAM

## 2023-01-01 PROCEDURE — 64445 NJX AA&/STRD SCIATIC NRV IMG: CPT | Performed by: ORTHOPAEDIC SURGERY

## 2023-01-01 PROCEDURE — 700105 HCHG RX REV CODE 258: Performed by: PHYSICAL MEDICINE & REHABILITATION

## 2023-01-01 PROCEDURE — 36620 INSERTION CATHETER ARTERY: CPT

## 2023-01-01 PROCEDURE — 302196 LINEN, HYPOALLERGENIC: Performed by: STUDENT IN AN ORGANIZED HEALTH CARE EDUCATION/TRAINING PROGRAM

## 2023-01-01 PROCEDURE — 700101 HCHG RX REV CODE 250: Performed by: HOSPITALIST

## 2023-01-01 PROCEDURE — 99239 HOSP IP/OBS DSCHRG MGMT >30: CPT | Performed by: INTERNAL MEDICINE

## 2023-01-01 PROCEDURE — 82270 OCCULT BLOOD FECES: CPT

## 2023-01-01 PROCEDURE — 94760 N-INVAS EAR/PLS OXIMETRY 1: CPT

## 2023-01-01 PROCEDURE — 73721 MRI JNT OF LWR EXTRE W/O DYE: CPT | Mod: RT

## 2023-01-01 PROCEDURE — 700101 HCHG RX REV CODE 250: Performed by: INTERNAL MEDICINE

## 2023-01-01 PROCEDURE — 87075 CULTR BACTERIA EXCEPT BLOOD: CPT

## 2023-01-01 PROCEDURE — C9803 HOPD COVID-19 SPEC COLLECT: HCPCS | Performed by: EMERGENCY MEDICINE

## 2023-01-01 PROCEDURE — 99255 IP/OBS CONSLTJ NEW/EST HI 80: CPT | Performed by: INTERNAL MEDICINE

## 2023-01-01 PROCEDURE — 110371 HCHG SHELL REV 272: Performed by: ORTHOPAEDIC SURGERY

## 2023-01-01 PROCEDURE — 160027 HCHG SURGERY MINUTES - 1ST 30 MINS LEVEL 2: Performed by: ORTHOPAEDIC SURGERY

## 2023-01-01 PROCEDURE — 84484 ASSAY OF TROPONIN QUANT: CPT

## 2023-01-01 PROCEDURE — 160038 HCHG SURGERY MINUTES - EA ADDL 1 MIN LEVEL 2: Performed by: STUDENT IN AN ORGANIZED HEALTH CARE EDUCATION/TRAINING PROGRAM

## 2023-01-01 PROCEDURE — 96365 THER/PROPH/DIAG IV INF INIT: CPT

## 2023-01-01 PROCEDURE — 93312 ECHO TRANSESOPHAGEAL: CPT | Mod: 26 | Performed by: INTERNAL MEDICINE

## 2023-01-01 PROCEDURE — 73701 CT LOWER EXTREMITY W/DYE: CPT | Mod: LT

## 2023-01-01 PROCEDURE — 160048 HCHG OR STATISTICAL LEVEL 1-5: Performed by: STUDENT IN AN ORGANIZED HEALTH CARE EDUCATION/TRAINING PROGRAM

## 2023-01-01 PROCEDURE — 93005 ELECTROCARDIOGRAM TRACING: CPT | Performed by: INTERNAL MEDICINE

## 2023-01-01 PROCEDURE — 160002 HCHG RECOVERY MINUTES (STAT): Performed by: STUDENT IN AN ORGANIZED HEALTH CARE EDUCATION/TRAINING PROGRAM

## 2023-01-01 PROCEDURE — 700102 HCHG RX REV CODE 250 W/ 637 OVERRIDE(OP): Performed by: PHYSICIAN ASSISTANT

## 2023-01-01 PROCEDURE — 20692 APPL MLTPLN UNI EXT FIXJ SYS: CPT | Mod: 79,59,RT | Performed by: ORTHOPAEDIC SURGERY

## 2023-01-01 PROCEDURE — 87206 SMEAR FLUORESCENT/ACID STAI: CPT

## 2023-01-01 PROCEDURE — 99231 SBSQ HOSP IP/OBS SF/LOW 25: CPT | Performed by: INTERNAL MEDICINE

## 2023-01-01 PROCEDURE — 97116 GAIT TRAINING THERAPY: CPT | Mod: CQ

## 2023-01-01 PROCEDURE — 70450 CT HEAD/BRAIN W/O DYE: CPT

## 2023-01-01 PROCEDURE — 93503 INSERT/PLACE HEART CATHETER: CPT

## 2023-01-01 PROCEDURE — 87181 SC STD AGAR DILUTION PER AGT: CPT

## 2023-01-01 PROCEDURE — 700105 HCHG RX REV CODE 258

## 2023-01-01 PROCEDURE — 27880 AMPUTATION OF LOWER LEG: CPT | Mod: LT | Performed by: ORTHOPAEDIC SURGERY

## 2023-01-01 PROCEDURE — 99292 CRITICAL CARE ADDL 30 MIN: CPT | Performed by: INTERNAL MEDICINE

## 2023-01-01 PROCEDURE — 86038 ANTINUCLEAR ANTIBODIES: CPT

## 2023-01-01 PROCEDURE — 90471 IMMUNIZATION ADMIN: CPT

## 2023-01-01 PROCEDURE — B2111ZZ FLUOROSCOPY OF MULTIPLE CORONARY ARTERIES USING LOW OSMOLAR CONTRAST: ICD-10-PCS | Performed by: INTERNAL MEDICINE

## 2023-01-01 PROCEDURE — A9270 NON-COVERED ITEM OR SERVICE: HCPCS | Mod: JZ | Performed by: INTERNAL MEDICINE

## 2023-01-01 PROCEDURE — 99223 1ST HOSP IP/OBS HIGH 75: CPT | Performed by: PHYSICAL MEDICINE & REHABILITATION

## 2023-01-01 PROCEDURE — 83540 ASSAY OF IRON: CPT

## 2023-01-01 PROCEDURE — 11042 DBRDMT SUBQ TIS 1ST 20SQCM/<: CPT

## 2023-01-01 PROCEDURE — 97110 THERAPEUTIC EXERCISES: CPT | Mod: CQ

## 2023-01-01 PROCEDURE — 36620 INSERTION CATHETER ARTERY: CPT | Performed by: INTERNAL MEDICINE

## 2023-01-01 PROCEDURE — 99252 IP/OBS CONSLTJ NEW/EST SF 35: CPT | Performed by: HOSPITALIST

## 2023-01-01 PROCEDURE — 93005 ELECTROCARDIOGRAM TRACING: CPT | Performed by: HOSPITALIST

## 2023-01-01 PROCEDURE — 97530 THERAPEUTIC ACTIVITIES: CPT | Mod: CQ

## 2023-01-01 PROCEDURE — 700117 HCHG RX CONTRAST REV CODE 255: Performed by: STUDENT IN AN ORGANIZED HEALTH CARE EDUCATION/TRAINING PROGRAM

## 2023-01-01 PROCEDURE — 73590 X-RAY EXAM OF LOWER LEG: CPT | Mod: LT

## 2023-01-01 PROCEDURE — 97166 OT EVAL MOD COMPLEX 45 MIN: CPT

## 2023-01-01 PROCEDURE — 74177 CT ABD & PELVIS W/CONTRAST: CPT

## 2023-01-01 PROCEDURE — 90837 PSYTX W PT 60 MINUTES: CPT | Mod: MISDOCU | Performed by: PSYCHOLOGIST

## 2023-01-01 PROCEDURE — 97597 DBRDMT OPN WND 1ST 20 CM/<: CPT

## 2023-01-01 PROCEDURE — U0003 INFECTIOUS AGENT DETECTION BY NUCLEIC ACID (DNA OR RNA); SEVERE ACUTE RESPIRATORY SYNDROME CORONAVIRUS 2 (SARS-COV-2) (CORONAVIRUS DISEASE [COVID-19]), AMPLIFIED PROBE TECHNIQUE, MAKING USE OF HIGH THROUGHPUT TECHNOLOGIES AS DESCRIBED BY CMS-2020-01-R: HCPCS

## 2023-01-01 PROCEDURE — 700102 HCHG RX REV CODE 250 W/ 637 OVERRIDE(OP): Performed by: ANESTHESIOLOGY

## 2023-01-01 PROCEDURE — 700101 HCHG RX REV CODE 250: Performed by: EMERGENCY MEDICINE

## 2023-01-01 PROCEDURE — 97163 PT EVAL HIGH COMPLEX 45 MIN: CPT

## 2023-01-01 PROCEDURE — 93005 ELECTROCARDIOGRAM TRACING: CPT | Performed by: NURSE PRACTITIONER

## 2023-01-01 PROCEDURE — 303105 HCHG CATHETER EXTRA

## 2023-01-01 PROCEDURE — 84145 PROCALCITONIN (PCT): CPT | Mod: 91

## 2023-01-01 PROCEDURE — 80074 ACUTE HEPATITIS PANEL: CPT

## 2023-01-01 PROCEDURE — 74018 RADEX ABDOMEN 1 VIEW: CPT

## 2023-01-01 PROCEDURE — 73720 MRI LWR EXTREMITY W/O&W/DYE: CPT | Mod: LT

## 2023-01-01 PROCEDURE — 76705 ECHO EXAM OF ABDOMEN: CPT

## 2023-01-01 PROCEDURE — 99233 SBSQ HOSP IP/OBS HIGH 50: CPT | Performed by: STUDENT IN AN ORGANIZED HEALTH CARE EDUCATION/TRAINING PROGRAM

## 2023-01-01 PROCEDURE — 0241U HCHG SARS-COV-2 COVID-19 NFCT DS RESP RNA 4 TRGT MIC: CPT

## 2023-01-01 PROCEDURE — 86381 MITOCHONDRIAL ANTIBODY EACH: CPT

## 2023-01-01 PROCEDURE — 97535 SELF CARE MNGMENT TRAINING: CPT | Mod: CO

## 2023-01-01 PROCEDURE — 700117 HCHG RX CONTRAST REV CODE 255: Performed by: INTERNAL MEDICINE

## 2023-01-01 PROCEDURE — 97605 NEG PRS WND THER DME<=50SQCM: CPT | Performed by: ORTHOPAEDIC SURGERY

## 2023-01-01 PROCEDURE — 700111 HCHG RX REV CODE 636 W/ 250 OVERRIDE (IP): Mod: JZ | Performed by: ANESTHESIOLOGY

## 2023-01-01 PROCEDURE — 99406 BEHAV CHNG SMOKING 3-10 MIN: CPT | Performed by: PHYSICAL MEDICINE & REHABILITATION

## 2023-01-01 PROCEDURE — 82533 TOTAL CORTISOL: CPT

## 2023-01-01 PROCEDURE — 73620 X-RAY EXAM OF FOOT: CPT | Mod: LT

## 2023-01-01 PROCEDURE — 99214 OFFICE O/P EST MOD 30 MIN: CPT | Performed by: STUDENT IN AN ORGANIZED HEALTH CARE EDUCATION/TRAINING PROGRAM

## 2023-01-01 PROCEDURE — 93010 ELECTROCARDIOGRAM REPORT: CPT | Mod: 77 | Performed by: INTERNAL MEDICINE

## 2023-01-01 PROCEDURE — 97116 GAIT TRAINING THERAPY: CPT

## 2023-01-01 PROCEDURE — 99233 SBSQ HOSP IP/OBS HIGH 50: CPT | Performed by: NURSE PRACTITIONER

## 2023-01-01 PROCEDURE — 700111 HCHG RX REV CODE 636 W/ 250 OVERRIDE (IP): Mod: JZ | Performed by: STUDENT IN AN ORGANIZED HEALTH CARE EDUCATION/TRAINING PROGRAM

## 2023-01-01 PROCEDURE — 86140 C-REACTIVE PROTEIN: CPT

## 2023-01-01 PROCEDURE — 160038 HCHG SURGERY MINUTES - EA ADDL 1 MIN LEVEL 2: Performed by: ORTHOPAEDIC SURGERY

## 2023-01-01 PROCEDURE — 85055 RETICULATED PLATELET ASSAY: CPT

## 2023-01-01 PROCEDURE — 99291 CRITICAL CARE FIRST HOUR: CPT

## 2023-01-01 PROCEDURE — 85730 THROMBOPLASTIN TIME PARTIAL: CPT

## 2023-01-01 PROCEDURE — 93306 TTE W/DOPPLER COMPLETE: CPT

## 2023-01-01 PROCEDURE — 770000 HCHG ROOM/CARE - INTERMEDIATE ICU *

## 2023-01-01 PROCEDURE — 84132 ASSAY OF SERUM POTASSIUM: CPT

## 2023-01-01 PROCEDURE — 99223 1ST HOSP IP/OBS HIGH 75: CPT | Performed by: HOSPITALIST

## 2023-01-01 PROCEDURE — 88307 TISSUE EXAM BY PATHOLOGIST: CPT

## 2023-01-01 PROCEDURE — 0Y6J0Z2 DETACHMENT AT LEFT LOWER LEG, MID, OPEN APPROACH: ICD-10-PCS | Performed by: ORTHOPAEDIC SURGERY

## 2023-01-01 PROCEDURE — 700105 HCHG RX REV CODE 258: Performed by: STUDENT IN AN ORGANIZED HEALTH CARE EDUCATION/TRAINING PROGRAM

## 2023-01-01 PROCEDURE — 99255 IP/OBS CONSLTJ NEW/EST HI 80: CPT | Performed by: PHYSICAL MEDICINE & REHABILITATION

## 2023-01-01 PROCEDURE — 83605 ASSAY OF LACTIC ACID: CPT | Mod: 91

## 2023-01-01 PROCEDURE — 99221 1ST HOSP IP/OBS SF/LOW 40: CPT | Mod: 25

## 2023-01-01 PROCEDURE — 307059 PAD,EAR PROTECTOR: Performed by: ORTHOPAEDIC SURGERY

## 2023-01-01 PROCEDURE — 82803 BLOOD GASES ANY COMBINATION: CPT

## 2023-01-01 PROCEDURE — 80069 RENAL FUNCTION PANEL: CPT

## 2023-01-01 PROCEDURE — 87147 CULTURE TYPE IMMUNOLOGIC: CPT

## 2023-01-01 PROCEDURE — 93306 TTE W/DOPPLER COMPLETE: CPT | Mod: 26 | Performed by: INTERNAL MEDICINE

## 2023-01-01 PROCEDURE — 99222 1ST HOSP IP/OBS MODERATE 55: CPT | Performed by: HOSPITALIST

## 2023-01-01 PROCEDURE — 160002 HCHG RECOVERY MINUTES (STAT): Performed by: ORTHOPAEDIC SURGERY

## 2023-01-01 PROCEDURE — 80069 RENAL FUNCTION PANEL: CPT | Mod: 91

## 2023-01-01 PROCEDURE — C1751 CATH, INF, PER/CENT/MIDLINE: HCPCS

## 2023-01-01 PROCEDURE — 82947 ASSAY GLUCOSE BLOOD QUANT: CPT

## 2023-01-01 PROCEDURE — 96367 TX/PROPH/DG ADDL SEQ IV INF: CPT

## 2023-01-01 PROCEDURE — 83935 ASSAY OF URINE OSMOLALITY: CPT

## 2023-01-01 PROCEDURE — 700105 HCHG RX REV CODE 258: Performed by: NURSE PRACTITIONER

## 2023-01-01 PROCEDURE — 99232 SBSQ HOSP IP/OBS MODERATE 35: CPT

## 2023-01-01 PROCEDURE — 28585 REPAIR FOOT DISLOCATION: CPT | Mod: 79,RT | Performed by: ORTHOPAEDIC SURGERY

## 2023-01-01 PROCEDURE — RXMED WILLOW AMBULATORY MEDICATION CHARGE: Performed by: PHYSICAL MEDICINE & REHABILITATION

## 2023-01-01 PROCEDURE — 71275 CT ANGIOGRAPHY CHEST: CPT

## 2023-01-01 PROCEDURE — 86431 RHEUMATOID FACTOR QUANT: CPT

## 2023-01-01 PROCEDURE — 700117 HCHG RX CONTRAST REV CODE 255: Performed by: HOSPITALIST

## 2023-01-01 PROCEDURE — 80307 DRUG TEST PRSMV CHEM ANLYZR: CPT

## 2023-01-01 PROCEDURE — 82140 ASSAY OF AMMONIA: CPT

## 2023-01-01 PROCEDURE — 82607 VITAMIN B-12: CPT

## 2023-01-01 PROCEDURE — 0SGH07Z FUSION OF RIGHT TARSAL JOINT WITH AUTOLOGOUS TISSUE SUBSTITUTE, OPEN APPROACH: ICD-10-PCS | Performed by: ORTHOPAEDIC SURGERY

## 2023-01-01 PROCEDURE — A9579 GAD-BASE MR CONTRAST NOS,1ML: HCPCS | Performed by: STUDENT IN AN ORGANIZED HEALTH CARE EDUCATION/TRAINING PROGRAM

## 2023-01-01 PROCEDURE — 99255 IP/OBS CONSLTJ NEW/EST HI 80: CPT | Mod: 25 | Performed by: PHYSICAL MEDICINE & REHABILITATION

## 2023-01-01 PROCEDURE — 85027 COMPLETE CBC AUTOMATED: CPT | Mod: 91

## 2023-01-01 PROCEDURE — 97167 OT EVAL HIGH COMPLEX 60 MIN: CPT

## 2023-01-01 PROCEDURE — 502240 HCHG MISC OR SUPPLY RC 0272: Performed by: ORTHOPAEDIC SURGERY

## 2023-01-01 PROCEDURE — 700111 HCHG RX REV CODE 636 W/ 250 OVERRIDE (IP): Mod: JZ | Performed by: HOSPITALIST

## 2023-01-01 PROCEDURE — 3078F DIAST BP <80 MM HG: CPT | Performed by: STUDENT IN AN ORGANIZED HEALTH CARE EDUCATION/TRAINING PROGRAM

## 2023-01-01 PROCEDURE — 96375 TX/PRO/DX INJ NEW DRUG ADDON: CPT

## 2023-01-01 PROCEDURE — 96376 TX/PRO/DX INJ SAME DRUG ADON: CPT

## 2023-01-01 PROCEDURE — 96366 THER/PROPH/DIAG IV INF ADDON: CPT

## 2023-01-01 PROCEDURE — C1713 ANCHOR/SCREW BN/BN,TIS/BN: HCPCS | Performed by: ORTHOPAEDIC SURGERY

## 2023-01-01 PROCEDURE — 700105 HCHG RX REV CODE 258: Mod: JZ | Performed by: EMERGENCY MEDICINE

## 2023-01-01 PROCEDURE — 700101 HCHG RX REV CODE 250: Performed by: ANESTHESIOLOGY

## 2023-01-01 PROCEDURE — 82390 ASSAY OF CERULOPLASMIN: CPT

## 2023-01-01 PROCEDURE — 73630 X-RAY EXAM OF FOOT: CPT | Mod: TC,FY,LT | Performed by: RADIOLOGY

## 2023-01-01 PROCEDURE — C9803 HOPD COVID-19 SPEC COLLECT: HCPCS | Performed by: STUDENT IN AN ORGANIZED HEALTH CARE EDUCATION/TRAINING PROGRAM

## 2023-01-01 PROCEDURE — 88342 IMHCHEM/IMCYTCHM 1ST ANTB: CPT

## 2023-01-01 PROCEDURE — 87077 CULTURE AEROBIC IDENTIFY: CPT

## 2023-01-01 PROCEDURE — C9803 HOPD COVID-19 SPEC COLLECT: HCPCS

## 2023-01-01 PROCEDURE — 99024 POSTOP FOLLOW-UP VISIT: CPT | Performed by: ORTHOPAEDIC SURGERY

## 2023-01-01 PROCEDURE — 28715 ARTHRODESIS TRIPLE: CPT | Mod: 79,RT | Performed by: ORTHOPAEDIC SURGERY

## 2023-01-01 PROCEDURE — 87015 SPECIMEN INFECT AGNT CONCNTJ: CPT

## 2023-01-01 PROCEDURE — 87077 CULTURE AEROBIC IDENTIFY: CPT | Mod: 91

## 2023-01-01 PROCEDURE — 700102 HCHG RX REV CODE 250 W/ 637 OVERRIDE(OP): Performed by: EMERGENCY MEDICINE

## 2023-01-01 PROCEDURE — 700111 HCHG RX REV CODE 636 W/ 250 OVERRIDE (IP): Mod: JZ | Performed by: NURSE PRACTITIONER

## 2023-01-01 PROCEDURE — 99156 MOD SED OTH PHYS/QHP 5/>YRS: CPT | Performed by: INTERNAL MEDICINE

## 2023-01-01 PROCEDURE — 88304 TISSUE EXAM BY PATHOLOGIST: CPT

## 2023-01-01 PROCEDURE — 700111 HCHG RX REV CODE 636 W/ 250 OVERRIDE (IP): Performed by: EMERGENCY MEDICINE

## 2023-01-01 PROCEDURE — RXMED WILLOW AMBULATORY MEDICATION CHARGE: Performed by: INTERNAL MEDICINE

## 2023-01-01 PROCEDURE — 80061 LIPID PANEL: CPT

## 2023-01-01 PROCEDURE — 87186 SC STD MICRODIL/AGAR DIL: CPT | Mod: 91

## 2023-01-01 PROCEDURE — 02HV33Z INSERTION OF INFUSION DEVICE INTO SUPERIOR VENA CAVA, PERCUTANEOUS APPROACH: ICD-10-PCS | Performed by: HOSPITALIST

## 2023-01-01 PROCEDURE — A9270 NON-COVERED ITEM OR SERVICE: HCPCS | Performed by: PHYSICIAN ASSISTANT

## 2023-01-01 PROCEDURE — 97112 NEUROMUSCULAR REEDUCATION: CPT | Mod: CQ

## 2023-01-01 PROCEDURE — 700117 HCHG RX CONTRAST REV CODE 255: Performed by: EMERGENCY MEDICINE

## 2023-01-01 PROCEDURE — 93005 ELECTROCARDIOGRAM TRACING: CPT | Performed by: EMERGENCY MEDICINE

## 2023-01-01 PROCEDURE — 84425 ASSAY OF VITAMIN B-1: CPT

## 2023-01-01 PROCEDURE — 71046 X-RAY EXAM CHEST 2 VIEWS: CPT | Mod: TC,FY | Performed by: RADIOLOGY

## 2023-01-01 PROCEDURE — 27301 DRAIN THIGH/KNEE LESION: CPT | Mod: LT | Performed by: ORTHOPAEDIC SURGERY

## 2023-01-01 PROCEDURE — 160039 HCHG SURGERY MINUTES - EA ADDL 1 MIN LEVEL 3: Performed by: ORTHOPAEDIC SURGERY

## 2023-01-01 PROCEDURE — 160035 HCHG PACU - 1ST 60 MINS PHASE I: Performed by: ORTHOPAEDIC SURGERY

## 2023-01-01 PROCEDURE — 99231 SBSQ HOSP IP/OBS SF/LOW 25: CPT | Performed by: HOSPITALIST

## 2023-01-01 PROCEDURE — 27685 REVISION OF LOWER LEG TENDON: CPT | Mod: 80ROC,79,RT | Performed by: STUDENT IN AN ORGANIZED HEALTH CARE EDUCATION/TRAINING PROGRAM

## 2023-01-01 PROCEDURE — 99024 POSTOP FOLLOW-UP VISIT: CPT | Performed by: SURGERY

## 2023-01-01 PROCEDURE — B3101ZZ FLUOROSCOPY OF THORACIC AORTA USING LOW OSMOLAR CONTRAST: ICD-10-PCS | Performed by: INTERNAL MEDICINE

## 2023-01-01 PROCEDURE — 94799 UNLISTED PULMONARY SVC/PX: CPT

## 2023-01-01 PROCEDURE — 74176 CT ABD & PELVIS W/O CONTRAST: CPT

## 2023-01-01 PROCEDURE — 80179 DRUG ASSAY SALICYLATE: CPT

## 2023-01-01 PROCEDURE — 87015 SPECIMEN INFECT AGNT CONCNTJ: CPT | Mod: 91

## 2023-01-01 PROCEDURE — 36556 INSERT NON-TUNNEL CV CATH: CPT | Performed by: INTERNAL MEDICINE

## 2023-01-01 PROCEDURE — 71250 CT THORAX DX C-: CPT

## 2023-01-01 PROCEDURE — 700101 HCHG RX REV CODE 250: Performed by: ORTHOPAEDIC SURGERY

## 2023-01-01 PROCEDURE — 94669 MECHANICAL CHEST WALL OSCILL: CPT

## 2023-01-01 PROCEDURE — 76870 US EXAM SCROTUM: CPT

## 2023-01-01 PROCEDURE — 160041 HCHG SURGERY MINUTES - EA ADDL 1 MIN LEVEL 4: Performed by: ORTHOPAEDIC SURGERY

## 2023-01-01 PROCEDURE — 86015 ACTIN ANTIBODY EACH: CPT

## 2023-01-01 PROCEDURE — 51798 US URINE CAPACITY MEASURE: CPT

## 2023-01-01 PROCEDURE — 73620 X-RAY EXAM OF FOOT: CPT | Mod: RT | Performed by: RADIOLOGY

## 2023-01-01 PROCEDURE — 700101 HCHG RX REV CODE 250: Performed by: NURSE PRACTITIONER

## 2023-01-01 PROCEDURE — 86850 RBC ANTIBODY SCREEN: CPT

## 2023-01-01 PROCEDURE — 700111 HCHG RX REV CODE 636 W/ 250 OVERRIDE (IP): Mod: JZ

## 2023-01-01 PROCEDURE — 0JBP0ZZ EXCISION OF LEFT LOWER LEG SUBCUTANEOUS TISSUE AND FASCIA, OPEN APPROACH: ICD-10-PCS | Performed by: ORTHOPAEDIC SURGERY

## 2023-01-01 PROCEDURE — 160036 HCHG PACU - EA ADDL 30 MINS PHASE I: Performed by: STUDENT IN AN ORGANIZED HEALTH CARE EDUCATION/TRAINING PROGRAM

## 2023-01-01 PROCEDURE — 700105 HCHG RX REV CODE 258: Performed by: ORTHOPAEDIC SURGERY

## 2023-01-01 PROCEDURE — 28585 REPAIR FOOT DISLOCATION: CPT | Mod: 80ROC,79,RT | Performed by: STUDENT IN AN ORGANIZED HEALTH CARE EDUCATION/TRAINING PROGRAM

## 2023-01-01 PROCEDURE — 99153 MOD SED SAME PHYS/QHP EA: CPT

## 2023-01-01 PROCEDURE — 99291 CRITICAL CARE FIRST HOUR: CPT | Performed by: HOSPITALIST

## 2023-01-01 PROCEDURE — 0SGH04Z FUSION OF RIGHT TARSAL JOINT WITH INTERNAL FIXATION DEVICE, OPEN APPROACH: ICD-10-PCS | Performed by: ORTHOPAEDIC SURGERY

## 2023-01-01 PROCEDURE — 99239 HOSP IP/OBS DSCHRG MGMT >30: CPT | Performed by: HOSPITALIST

## 2023-01-01 PROCEDURE — 99285 EMERGENCY DEPT VISIT HI MDM: CPT

## 2023-01-01 PROCEDURE — A9270 NON-COVERED ITEM OR SERVICE: HCPCS | Mod: JZ | Performed by: STUDENT IN AN ORGANIZED HEALTH CARE EDUCATION/TRAINING PROGRAM

## 2023-01-01 PROCEDURE — 87880 STREP A ASSAY W/OPTIC: CPT | Performed by: FAMILY MEDICINE

## 2023-01-01 PROCEDURE — 99214 OFFICE O/P EST MOD 30 MIN: CPT | Mod: 95 | Performed by: STUDENT IN AN ORGANIZED HEALTH CARE EDUCATION/TRAINING PROGRAM

## 2023-01-01 PROCEDURE — B24BZZ4 ULTRASONOGRAPHY OF HEART WITH AORTA, TRANSESOPHAGEAL: ICD-10-PCS | Performed by: INTERNAL MEDICINE

## 2023-01-01 PROCEDURE — 88311 DECALCIFY TISSUE: CPT

## 2023-01-01 PROCEDURE — 90832 PSYTX W PT 30 MINUTES: CPT | Mod: MISDOCU | Performed by: PSYCHOLOGIST

## 2023-01-01 PROCEDURE — 73630 X-RAY EXAM OF FOOT: CPT | Mod: RT

## 2023-01-01 PROCEDURE — 01482 ANES OPN LWR L/A/F RAD RESCJ: CPT | Performed by: ANESTHESIOLOGY

## 2023-01-01 PROCEDURE — 82784 ASSAY IGA/IGD/IGG/IGM EACH: CPT

## 2023-01-01 PROCEDURE — 86900 BLOOD TYPING SEROLOGIC ABO: CPT

## 2023-01-01 PROCEDURE — 83735 ASSAY OF MAGNESIUM: CPT | Mod: 91

## 2023-01-01 PROCEDURE — 73590 X-RAY EXAM OF LOWER LEG: CPT | Mod: RT

## 2023-01-01 PROCEDURE — 93454 CORONARY ARTERY ANGIO S&I: CPT | Mod: 26 | Performed by: INTERNAL MEDICINE

## 2023-01-01 PROCEDURE — 76775 US EXAM ABDO BACK WALL LIM: CPT

## 2023-01-01 PROCEDURE — 0QBC0ZZ EXCISION OF LEFT LOWER FEMUR, OPEN APPROACH: ICD-10-PCS | Performed by: STUDENT IN AN ORGANIZED HEALTH CARE EDUCATION/TRAINING PROGRAM

## 2023-01-01 PROCEDURE — 99239 HOSP IP/OBS DSCHRG MGMT >30: CPT | Performed by: STUDENT IN AN ORGANIZED HEALTH CARE EDUCATION/TRAINING PROGRAM

## 2023-01-01 PROCEDURE — 99255 IP/OBS CONSLTJ NEW/EST HI 80: CPT | Mod: AI | Performed by: HOSPITALIST

## 2023-01-01 PROCEDURE — 99213 OFFICE O/P EST LOW 20 MIN: CPT | Performed by: FAMILY MEDICINE

## 2023-01-01 PROCEDURE — U0005 INFEC AGEN DETEC AMPLI PROBE: HCPCS

## 2023-01-01 PROCEDURE — 86200 CCP ANTIBODY: CPT

## 2023-01-01 PROCEDURE — 97530 THERAPEUTIC ACTIVITIES: CPT | Mod: CO

## 2023-01-01 PROCEDURE — 93005 ELECTROCARDIOGRAM TRACING: CPT | Performed by: ORTHOPAEDIC SURGERY

## 2023-01-01 PROCEDURE — 82570 ASSAY OF URINE CREATININE: CPT

## 2023-01-01 PROCEDURE — 160027 HCHG SURGERY MINUTES - 1ST 30 MINS LEVEL 2: Performed by: STUDENT IN AN ORGANIZED HEALTH CARE EDUCATION/TRAINING PROGRAM

## 2023-01-01 PROCEDURE — 93005 ELECTROCARDIOGRAM TRACING: CPT | Performed by: ANESTHESIOLOGY

## 2023-01-01 PROCEDURE — 99291 CRITICAL CARE FIRST HOUR: CPT | Performed by: NURSE PRACTITIONER

## 2023-01-01 PROCEDURE — 99211 OFF/OP EST MAY X REQ PHY/QHP: CPT | Performed by: NURSE PRACTITIONER

## 2023-01-01 PROCEDURE — 0L8N0ZZ DIVISION OF RIGHT LOWER LEG TENDON, OPEN APPROACH: ICD-10-PCS | Performed by: ORTHOPAEDIC SURGERY

## 2023-01-01 PROCEDURE — 700102 HCHG RX REV CODE 250 W/ 637 OVERRIDE(OP): Mod: JZ | Performed by: NURSE PRACTITIONER

## 2023-01-01 PROCEDURE — 0QHL35Z INSERTION OF EXTERNAL FIXATION DEVICE INTO RIGHT TARSAL, PERCUTANEOUS APPROACH: ICD-10-PCS | Performed by: ORTHOPAEDIC SURGERY

## 2023-01-01 PROCEDURE — 700102 HCHG RX REV CODE 250 W/ 637 OVERRIDE(OP): Mod: JZ | Performed by: STUDENT IN AN ORGANIZED HEALTH CARE EDUCATION/TRAINING PROGRAM

## 2023-01-01 PROCEDURE — 700111 HCHG RX REV CODE 636 W/ 250 OVERRIDE (IP): Mod: JZ | Performed by: ORTHOPAEDIC SURGERY

## 2023-01-01 PROCEDURE — 502000 HCHG MISC OR IMPLANTS RC 0278: Performed by: ORTHOPAEDIC SURGERY

## 2023-01-01 PROCEDURE — 160029 HCHG SURGERY MINUTES - 1ST 30 MINS LEVEL 4: Performed by: ORTHOPAEDIC SURGERY

## 2023-01-01 PROCEDURE — 700111 HCHG RX REV CODE 636 W/ 250 OVERRIDE (IP): Performed by: ORTHOPAEDIC SURGERY

## 2023-01-01 PROCEDURE — 80076 HEPATIC FUNCTION PANEL: CPT

## 2023-01-01 PROCEDURE — 82077 ASSAY SPEC XCP UR&BREATH IA: CPT

## 2023-01-01 PROCEDURE — 87040 BLOOD CULTURE FOR BACTERIA: CPT | Mod: 91

## 2023-01-01 PROCEDURE — 93971 EXTREMITY STUDY: CPT | Mod: LT

## 2023-01-01 PROCEDURE — 90791 PSYCH DIAGNOSTIC EVALUATION: CPT | Performed by: PSYCHOLOGIST

## 2023-01-01 PROCEDURE — 99214 OFFICE O/P EST MOD 30 MIN: CPT | Performed by: PHYSICIAN ASSISTANT

## 2023-01-01 PROCEDURE — 700101 HCHG RX REV CODE 250

## 2023-01-01 PROCEDURE — 02HV33Z INSERTION OF INFUSION DEVICE INTO SUPERIOR VENA CAVA, PERCUTANEOUS APPROACH: ICD-10-PCS | Performed by: INTERNAL MEDICINE

## 2023-01-01 PROCEDURE — 93325 DOPPLER ECHO COLOR FLOW MAPG: CPT

## 2023-01-01 PROCEDURE — 84484 ASSAY OF TROPONIN QUANT: CPT | Mod: 91

## 2023-01-01 PROCEDURE — G0278 ILIAC ART ANGIO,CARDIAC CATH: HCPCS | Performed by: INTERNAL MEDICINE

## 2023-01-01 PROCEDURE — 93005 ELECTROCARDIOGRAM TRACING: CPT | Performed by: STUDENT IN AN ORGANIZED HEALTH CARE EDUCATION/TRAINING PROGRAM

## 2023-01-01 PROCEDURE — 90686 IIV4 VACC NO PRSV 0.5 ML IM: CPT | Performed by: ORTHOPAEDIC SURGERY

## 2023-01-01 PROCEDURE — 99212 OFFICE O/P EST SF 10 MIN: CPT | Mod: 95 | Performed by: NURSE PRACTITIONER

## 2023-01-01 PROCEDURE — 160009 HCHG ANES TIME/MIN: Performed by: STUDENT IN AN ORGANIZED HEALTH CARE EDUCATION/TRAINING PROGRAM

## 2023-01-01 PROCEDURE — 51702 INSERT TEMP BLADDER CATH: CPT

## 2023-01-01 PROCEDURE — 99291 CRITICAL CARE FIRST HOUR: CPT | Performed by: STUDENT IN AN ORGANIZED HEALTH CARE EDUCATION/TRAINING PROGRAM

## 2023-01-01 PROCEDURE — 73562 X-RAY EXAM OF KNEE 3: CPT | Mod: RT

## 2023-01-01 PROCEDURE — 87150 DNA/RNA AMPLIFIED PROBE: CPT | Mod: 91

## 2023-01-01 PROCEDURE — 73590 X-RAY EXAM OF LOWER LEG: CPT | Mod: RT | Performed by: RADIOLOGY

## 2023-01-01 PROCEDURE — 84134 ASSAY OF PREALBUMIN: CPT

## 2023-01-01 PROCEDURE — 20692 APPL MLTPLN UNI EXT FIXJ SYS: CPT | Mod: 80ROC,79,59,RT | Performed by: STUDENT IN AN ORGANIZED HEALTH CARE EDUCATION/TRAINING PROGRAM

## 2023-01-01 PROCEDURE — 80143 DRUG ASSAY ACETAMINOPHEN: CPT

## 2023-01-01 PROCEDURE — 99152 MOD SED SAME PHYS/QHP 5/>YRS: CPT | Performed by: INTERNAL MEDICINE

## 2023-01-01 PROCEDURE — 87389 HIV-1 AG W/HIV-1&-2 AB AG IA: CPT

## 2023-01-01 PROCEDURE — 73721 MRI JNT OF LWR EXTRE W/O DYE: CPT | Mod: LT

## 2023-01-01 PROCEDURE — C1894 INTRO/SHEATH, NON-LASER: HCPCS

## 2023-01-01 PROCEDURE — 27886 AMPUTATION FOLLOW-UP SURGERY: CPT | Mod: 79,LT | Performed by: ORTHOPAEDIC SURGERY

## 2023-01-01 PROCEDURE — 99232 SBSQ HOSP IP/OBS MODERATE 35: CPT | Mod: 25 | Performed by: PHYSICAL MEDICINE & REHABILITATION

## 2023-01-01 PROCEDURE — 99222 1ST HOSP IP/OBS MODERATE 55: CPT | Mod: 57 | Performed by: STUDENT IN AN ORGANIZED HEALTH CARE EDUCATION/TRAINING PROGRAM

## 2023-01-01 PROCEDURE — 99292 CRITICAL CARE ADDL 30 MIN: CPT | Mod: 25 | Performed by: INTERNAL MEDICINE

## 2023-01-01 PROCEDURE — 82652 VIT D 1 25-DIHYDROXY: CPT

## 2023-01-01 PROCEDURE — 99152 MOD SED SAME PHYS/QHP 5/>YRS: CPT

## 2023-01-01 PROCEDURE — 64447 NJX AA&/STRD FEMORAL NRV IMG: CPT | Mod: 59,LT | Performed by: ANESTHESIOLOGY

## 2023-01-01 PROCEDURE — 99255 IP/OBS CONSLTJ NEW/EST HI 80: CPT | Performed by: THORACIC SURGERY (CARDIOTHORACIC VASCULAR SURGERY)

## 2023-01-01 PROCEDURE — 160035 HCHG PACU - 1ST 60 MINS PHASE I: Performed by: STUDENT IN AN ORGANIZED HEALTH CARE EDUCATION/TRAINING PROGRAM

## 2023-01-01 PROCEDURE — 93970 EXTREMITY STUDY: CPT

## 2023-01-01 PROCEDURE — 82330 ASSAY OF CALCIUM: CPT

## 2023-01-01 PROCEDURE — 3E0T3BZ INTRODUCTION OF ANESTHETIC AGENT INTO PERIPHERAL NERVES AND PLEXI, PERCUTANEOUS APPROACH: ICD-10-PCS | Performed by: ANESTHESIOLOGY

## 2023-01-01 PROCEDURE — 93567 NJX CAR CTH SPRVLV AORTGRPHY: CPT | Performed by: INTERNAL MEDICINE

## 2023-01-01 PROCEDURE — 87102 FUNGUS ISOLATION CULTURE: CPT | Mod: 91

## 2023-01-01 PROCEDURE — 81001 URINALYSIS AUTO W/SCOPE: CPT | Mod: 91

## 2023-01-01 PROCEDURE — 99223 1ST HOSP IP/OBS HIGH 75: CPT | Performed by: INTERNAL MEDICINE

## 2023-01-01 PROCEDURE — 82728 ASSAY OF FERRITIN: CPT

## 2023-01-01 PROCEDURE — 02HQ32Z INSERTION OF MONITORING DEVICE INTO RIGHT PULMONARY ARTERY, PERCUTANEOUS APPROACH: ICD-10-PCS | Performed by: INTERNAL MEDICINE

## 2023-01-01 PROCEDURE — 02H633Z INSERTION OF INFUSION DEVICE INTO RIGHT ATRIUM, PERCUTANEOUS APPROACH: ICD-10-PCS | Performed by: EMERGENCY MEDICINE

## 2023-01-01 PROCEDURE — 99232 SBSQ HOSP IP/OBS MODERATE 35: CPT | Mod: FS | Performed by: INTERNAL MEDICINE

## 2023-01-01 PROCEDURE — 97533 SENSORY INTEGRATION: CPT

## 2023-01-01 PROCEDURE — B41D1ZZ FLUOROSCOPY OF AORTA AND BILATERAL LOWER EXTREMITY ARTERIES USING LOW OSMOLAR CONTRAST: ICD-10-PCS | Performed by: INTERNAL MEDICINE

## 2023-01-01 PROCEDURE — 0Y6J0Z1 DETACHMENT AT LEFT LOWER LEG, HIGH, OPEN APPROACH: ICD-10-PCS | Performed by: ORTHOPAEDIC SURGERY

## 2023-01-01 PROCEDURE — 99222 1ST HOSP IP/OBS MODERATE 55: CPT | Mod: 57 | Performed by: ORTHOPAEDIC SURGERY

## 2023-01-01 DEVICE — WIRE FIXATION KIRSCHNER L450 MM OD2 MM: Type: IMPLANTABLE DEVICE | Site: FOOT | Status: FUNCTIONAL

## 2023-01-01 DEVICE — WIRE EXTERNAL FIXATION L450 MM OD1.8 MM DIAMOND POINT: Type: IMPLANTABLE DEVICE | Site: FOOT | Status: FUNCTIONAL

## 2023-01-01 DEVICE — WASHER EXTERNAL FIXATION OD4 MM BLUE: Type: IMPLANTABLE DEVICE | Site: FOOT | Status: FUNCTIONAL

## 2023-01-01 DEVICE — GRAFT BONE AUGMENT 3CC (1/EA): Type: IMPLANTABLE DEVICE | Site: FOOT | Status: FUNCTIONAL

## 2023-01-01 DEVICE — NUT EXTERNAL FIXATION RING ROD: Type: IMPLANTABLE DEVICE | Site: FOOT | Status: FUNCTIONAL

## 2023-01-01 DEVICE — IMPLANTABLE DEVICE: Type: IMPLANTABLE DEVICE | Site: FOOT | Status: FUNCTIONAL

## 2023-01-01 DEVICE — RING EXTERNAL FIXATION CARBON ID155 MM FULL: Type: IMPLANTABLE DEVICE | Site: FOOT | Status: FUNCTIONAL

## 2023-01-01 DEVICE — WASHER ORTHOPEDIC HOFFMANN UNIVERSAL OD7 MM RED LIMB RECONSTRUCTION FRAME: Type: IMPLANTABLE DEVICE | Site: FOOT | Status: FUNCTIONAL

## 2023-01-01 DEVICE — NUT ORTHOPEDIC HOFFMANN M8 SHORT CONNECT LIMB RECONSTRUCTION FRAME: Type: IMPLANTABLE DEVICE | Site: FOOT | Status: FUNCTIONAL

## 2023-01-01 DEVICE — BOLT EXTERNAL FIXATION HOFFMANN STRUT CONNECTION LIMB RECONSTRUCTION FRAME RING: Type: IMPLANTABLE DEVICE | Site: FOOT | Status: FUNCTIONAL

## 2023-01-01 DEVICE — BOLT EXTERNAL FIXATION HOFFMANN LONG OD1.5-2 MM WIRE LIMB RECONSTRUCTION FRAME: Type: IMPLANTABLE DEVICE | Site: FOOT | Status: FUNCTIONAL

## 2023-01-01 DEVICE — NUT EXTERNAL FIXATION M8 LONG L15 MM CONNECT HOFFMANN LIMB RECONSTRUCTION FRAME: Type: IMPLANTABLE DEVICE | Site: FOOT | Status: FUNCTIONAL

## 2023-01-01 DEVICE — SHOE EXTERNAL FIXATION MEDIUM LONG ROCKER: Type: IMPLANTABLE DEVICE | Site: FOOT | Status: FUNCTIONAL

## 2023-01-01 DEVICE — BOLT EXTERNAL FIXATION HOFFMANN MEDIUM OD1.5-2 MM WIRE LIMB RECONSTRUCTION FRAME: Type: IMPLANTABLE DEVICE | Site: FOOT | Status: FUNCTIONAL

## 2023-01-01 DEVICE — SLOTTED PLATE: Type: IMPLANTABLE DEVICE | Site: FOOT | Status: FUNCTIONAL

## 2023-01-01 RX ORDER — HYDROMORPHONE HYDROCHLORIDE 2 MG/ML
4 INJECTION, SOLUTION INTRAMUSCULAR; INTRAVENOUS; SUBCUTANEOUS
Status: DISCONTINUED | OUTPATIENT
Start: 2023-01-01 | End: 2023-01-01

## 2023-01-01 RX ORDER — ACETAMINOPHEN 325 MG/1
650 TABLET ORAL EVERY 6 HOURS
Status: DISCONTINUED | OUTPATIENT
Start: 2023-01-01 | End: 2023-01-01 | Stop reason: HOSPADM

## 2023-01-01 RX ORDER — MIRTAZAPINE 15 MG/1
7.5 TABLET, FILM COATED ORAL
Status: DISCONTINUED | OUTPATIENT
Start: 2023-01-01 | End: 2023-01-01

## 2023-01-01 RX ORDER — POLYETHYLENE GLYCOL 3350 17 G/17G
1 POWDER, FOR SOLUTION ORAL
Status: CANCELLED | OUTPATIENT
Start: 2023-01-01

## 2023-01-01 RX ORDER — DOCUSATE SODIUM 50 MG/5ML
10 LIQUID ORAL 3 TIMES DAILY
Status: DISCONTINUED | OUTPATIENT
Start: 2023-01-01 | End: 2023-01-01

## 2023-01-01 RX ORDER — MIRTAZAPINE 15 MG/1
7.5 TABLET, FILM COATED ORAL NIGHTLY PRN
Status: CANCELLED | OUTPATIENT
Start: 2023-01-01

## 2023-01-01 RX ORDER — METHYLPREDNISOLONE 4 MG/1
4 TABLET ORAL
Status: DISCONTINUED | OUTPATIENT
Start: 2023-01-01 | End: 2023-01-01 | Stop reason: HOSPADM

## 2023-01-01 RX ORDER — ZOLPIDEM TARTRATE 5 MG/1
5 TABLET ORAL NIGHTLY PRN
Qty: 30 TABLET | Refills: 0 | Status: SHIPPED | OUTPATIENT
Start: 2023-01-01 | End: 2023-01-01

## 2023-01-01 RX ORDER — MORPHINE SULFATE 30 MG/1
30 TABLET ORAL 4 TIMES DAILY
Qty: 64 TABLET | Refills: 0 | Status: SHIPPED | OUTPATIENT
Start: 2023-01-01 | End: 2023-01-01

## 2023-01-01 RX ORDER — OXYCODONE HYDROCHLORIDE 10 MG/1
10 TABLET ORAL
Status: DISCONTINUED | OUTPATIENT
Start: 2023-01-01 | End: 2023-01-01

## 2023-01-01 RX ORDER — MORPHINE SULFATE 30 MG/1
30 TABLET, FILM COATED, EXTENDED RELEASE ORAL EVERY 12 HOURS
Status: DISCONTINUED | OUTPATIENT
Start: 2023-01-01 | End: 2023-01-01 | Stop reason: HOSPADM

## 2023-01-01 RX ORDER — FUROSEMIDE 20 MG/1
20 TABLET ORAL 2 TIMES DAILY PRN
Status: DISCONTINUED | OUTPATIENT
Start: 2023-01-01 | End: 2023-01-01

## 2023-01-01 RX ORDER — METHYLPREDNISOLONE 4 MG/1
4 TABLET ORAL 3 TIMES DAILY
Qty: 9 TABLET | Refills: 0 | Status: ON HOLD
Start: 2023-01-01 | End: 2023-01-01

## 2023-01-01 RX ORDER — MAGNESIUM SULFATE HEPTAHYDRATE 40 MG/ML
4 INJECTION, SOLUTION INTRAVENOUS ONCE
Status: COMPLETED | OUTPATIENT
Start: 2023-01-01 | End: 2023-01-01

## 2023-01-01 RX ORDER — PREGABALIN 100 MG/1
200 CAPSULE ORAL 2 TIMES DAILY
Status: DISCONTINUED | OUTPATIENT
Start: 2023-01-01 | End: 2023-01-01

## 2023-01-01 RX ORDER — PREGABALIN 150 MG/1
150 CAPSULE ORAL 3 TIMES DAILY
Status: DISCONTINUED | OUTPATIENT
Start: 2023-01-01 | End: 2023-01-01 | Stop reason: HOSPADM

## 2023-01-01 RX ORDER — BISACODYL 10 MG
10 SUPPOSITORY, RECTAL RECTAL
Status: DISCONTINUED | OUTPATIENT
Start: 2023-01-01 | End: 2023-01-01

## 2023-01-01 RX ORDER — MORPHINE SULFATE 15 MG/1
15 TABLET, FILM COATED, EXTENDED RELEASE ORAL EVERY 12 HOURS
COMMUNITY

## 2023-01-01 RX ORDER — LIDOCAINE HYDROCHLORIDE 20 MG/ML
INJECTION, SOLUTION EPIDURAL; INFILTRATION; INTRACAUDAL; PERINEURAL PRN
Status: DISCONTINUED | OUTPATIENT
Start: 2023-01-01 | End: 2023-01-01 | Stop reason: SURG

## 2023-01-01 RX ORDER — OMEPRAZOLE 20 MG/1
20 CAPSULE, DELAYED RELEASE ORAL 2 TIMES DAILY
Qty: 60 CAPSULE | Refills: 2 | Status: ON HOLD | OUTPATIENT
Start: 2023-01-01 | End: 2023-01-01

## 2023-01-01 RX ORDER — VITAMIN B COMPLEX
1000 TABLET ORAL DAILY
Status: DISCONTINUED | OUTPATIENT
Start: 2023-01-01 | End: 2023-01-01

## 2023-01-01 RX ORDER — HYDROMORPHONE HYDROCHLORIDE 1 MG/ML
0.4 INJECTION, SOLUTION INTRAMUSCULAR; INTRAVENOUS; SUBCUTANEOUS
Status: DISCONTINUED | OUTPATIENT
Start: 2023-01-01 | End: 2023-01-01 | Stop reason: HOSPADM

## 2023-01-01 RX ORDER — BISACODYL 10 MG
10 SUPPOSITORY, RECTAL RECTAL
Status: DISCONTINUED | OUTPATIENT
Start: 2023-01-01 | End: 2023-01-01 | Stop reason: HOSPADM

## 2023-01-01 RX ORDER — ONDANSETRON 2 MG/ML
4 INJECTION INTRAMUSCULAR; INTRAVENOUS 4 TIMES DAILY PRN
Status: DISCONTINUED | OUTPATIENT
Start: 2023-01-01 | End: 2023-01-01 | Stop reason: HOSPADM

## 2023-01-01 RX ORDER — ALUMINA, MAGNESIA, AND SIMETHICONE 2400; 2400; 240 MG/30ML; MG/30ML; MG/30ML
20 SUSPENSION ORAL
Status: DISCONTINUED | OUTPATIENT
Start: 2023-01-01 | End: 2023-01-01 | Stop reason: HOSPADM

## 2023-01-01 RX ORDER — TORSEMIDE 20 MG/1
20 TABLET ORAL
Status: DISCONTINUED | OUTPATIENT
Start: 2023-01-01 | End: 2023-01-01

## 2023-01-01 RX ORDER — FUROSEMIDE 10 MG/ML
40 INJECTION INTRAMUSCULAR; INTRAVENOUS
Status: DISCONTINUED | OUTPATIENT
Start: 2023-01-01 | End: 2023-01-01

## 2023-01-01 RX ORDER — MORPHINE SULFATE 15 MG/1
15 TABLET, FILM COATED, EXTENDED RELEASE ORAL EVERY 12 HOURS
Status: CANCELLED | OUTPATIENT
Start: 2023-01-01

## 2023-01-01 RX ORDER — MORPHINE SULFATE 15 MG/1
30 TABLET ORAL EVERY 4 HOURS PRN
Status: DISCONTINUED | OUTPATIENT
Start: 2023-01-01 | End: 2023-01-01 | Stop reason: HOSPADM

## 2023-01-01 RX ORDER — HYDROMORPHONE HYDROCHLORIDE 2 MG/ML
INJECTION, SOLUTION INTRAMUSCULAR; INTRAVENOUS; SUBCUTANEOUS PRN
Status: DISCONTINUED | OUTPATIENT
Start: 2023-01-01 | End: 2023-01-01 | Stop reason: SURG

## 2023-01-01 RX ORDER — DEXTROSE AND SODIUM CHLORIDE 10; .45 G/100ML; G/100ML
INJECTION, SOLUTION INTRAVENOUS CONTINUOUS
Status: DISCONTINUED | OUTPATIENT
Start: 2023-01-01 | End: 2023-01-01

## 2023-01-01 RX ORDER — PREDNISONE 10 MG/1
10 TABLET ORAL 2 TIMES DAILY
Qty: 180 TABLET | Refills: 3 | Status: ON HOLD
Start: 2023-01-01 | End: 2023-01-01

## 2023-01-01 RX ORDER — SODIUM CHLORIDE 9 MG/ML
20 INJECTION, SOLUTION INTRAMUSCULAR; INTRAVENOUS; SUBCUTANEOUS EVERY 12 HOURS
Status: DISCONTINUED | OUTPATIENT
Start: 2023-01-01 | End: 2023-01-01 | Stop reason: HOSPADM

## 2023-01-01 RX ORDER — PHENYLEPHRINE HCL IN 0.9% NACL 0.5 MG/5ML
SYRINGE (ML) INTRAVENOUS PRN
Status: DISCONTINUED | OUTPATIENT
Start: 2023-01-01 | End: 2023-01-01 | Stop reason: SURG

## 2023-01-01 RX ORDER — DULOXETIN HYDROCHLORIDE 30 MG/1
60 CAPSULE, DELAYED RELEASE ORAL DAILY
Status: DISCONTINUED | OUTPATIENT
Start: 2023-01-01 | End: 2023-01-01 | Stop reason: HOSPADM

## 2023-01-01 RX ORDER — ENOXAPARIN SODIUM 100 MG/ML
40 INJECTION SUBCUTANEOUS DAILY
Status: DISCONTINUED | OUTPATIENT
Start: 2023-01-01 | End: 2023-01-01 | Stop reason: HOSPADM

## 2023-01-01 RX ORDER — MORPHINE SULFATE 15 MG/1
15 TABLET, FILM COATED, EXTENDED RELEASE ORAL EVERY 12 HOURS
Status: DISCONTINUED | OUTPATIENT
Start: 2023-01-01 | End: 2023-01-01 | Stop reason: HOSPADM

## 2023-01-01 RX ORDER — HYDROMORPHONE HYDROCHLORIDE 1 MG/ML
0.5 INJECTION, SOLUTION INTRAMUSCULAR; INTRAVENOUS; SUBCUTANEOUS ONCE
Status: COMPLETED | OUTPATIENT
Start: 2023-01-01 | End: 2023-01-01

## 2023-01-01 RX ORDER — AMIODARONE HYDROCHLORIDE 200 MG/1
200 TABLET ORAL
Status: DISCONTINUED | OUTPATIENT
Start: 2023-01-01 | End: 2023-01-01

## 2023-01-01 RX ORDER — EPHEDRINE SULFATE 50 MG/ML
5 INJECTION, SOLUTION INTRAVENOUS
Status: DISCONTINUED | OUTPATIENT
Start: 2023-01-01 | End: 2023-01-01 | Stop reason: HOSPADM

## 2023-01-01 RX ORDER — FUROSEMIDE 10 MG/ML
80 INJECTION INTRAMUSCULAR; INTRAVENOUS
Status: DISCONTINUED | OUTPATIENT
Start: 2023-01-01 | End: 2023-01-01

## 2023-01-01 RX ORDER — LINEZOLID 600 MG/1
600 TABLET, FILM COATED ORAL EVERY 12 HOURS
Status: DISCONTINUED | OUTPATIENT
Start: 2023-01-01 | End: 2023-01-01

## 2023-01-01 RX ORDER — MORPHINE SULFATE 15 MG/1
30 TABLET ORAL 4 TIMES DAILY
Status: DISCONTINUED | OUTPATIENT
Start: 2023-01-01 | End: 2023-01-01 | Stop reason: HOSPADM

## 2023-01-01 RX ORDER — POTASSIUM CHLORIDE 20 MEQ/1
40 TABLET, EXTENDED RELEASE ORAL 2 TIMES DAILY
Status: CANCELLED | OUTPATIENT
Start: 2023-01-01

## 2023-01-01 RX ORDER — ZOLPIDEM TARTRATE 5 MG/1
5 TABLET ORAL NIGHTLY PRN
Status: CANCELLED | OUTPATIENT
Start: 2023-01-01

## 2023-01-01 RX ORDER — POTASSIUM CHLORIDE 7.45 MG/ML
10 INJECTION INTRAVENOUS ONCE
Status: COMPLETED | OUTPATIENT
Start: 2023-01-01 | End: 2023-01-01

## 2023-01-01 RX ORDER — CALCIUM CHLORIDE 100 MG/ML
INJECTION INTRAVENOUS; INTRAVENTRICULAR PRN
Status: DISCONTINUED | OUTPATIENT
Start: 2023-01-01 | End: 2023-01-01 | Stop reason: SURG

## 2023-01-01 RX ORDER — ECHINACEA PURPUREA EXTRACT 125 MG
2 TABLET ORAL PRN
Status: DISCONTINUED | OUTPATIENT
Start: 2023-01-01 | End: 2023-01-01 | Stop reason: HOSPADM

## 2023-01-01 RX ORDER — MIDAZOLAM HYDROCHLORIDE 1 MG/ML
INJECTION INTRAMUSCULAR; INTRAVENOUS PRN
Status: DISCONTINUED | OUTPATIENT
Start: 2023-01-01 | End: 2023-01-01 | Stop reason: SURG

## 2023-01-01 RX ORDER — MORPHINE SULFATE 15 MG/1
15 TABLET, FILM COATED, EXTENDED RELEASE ORAL 2 TIMES DAILY
Status: DISCONTINUED | OUTPATIENT
Start: 2023-01-01 | End: 2023-01-01 | Stop reason: HOSPADM

## 2023-01-01 RX ORDER — SODIUM CHLORIDE 9 MG/ML
INJECTION, SOLUTION INTRAVENOUS CONTINUOUS
Status: DISCONTINUED | OUTPATIENT
Start: 2023-01-01 | End: 2023-01-01

## 2023-01-01 RX ORDER — POTASSIUM CHLORIDE 20 MEQ/1
40 TABLET, EXTENDED RELEASE ORAL 2 TIMES DAILY
Status: DISCONTINUED | OUTPATIENT
Start: 2023-01-01 | End: 2023-01-01 | Stop reason: HOSPADM

## 2023-01-01 RX ORDER — ACETAMINOPHEN 325 MG/1
650 TABLET ORAL EVERY 4 HOURS PRN
Status: DISCONTINUED | OUTPATIENT
Start: 2023-01-01 | End: 2023-01-01 | Stop reason: HOSPADM

## 2023-01-01 RX ORDER — HEPARIN SODIUM 1000 [USP'U]/ML
40 INJECTION, SOLUTION INTRAVENOUS; SUBCUTANEOUS PRN
Status: DISCONTINUED | OUTPATIENT
Start: 2023-01-01 | End: 2023-01-01

## 2023-01-01 RX ORDER — HEPARIN SODIUM 5000 [USP'U]/100ML
0-30 INJECTION, SOLUTION INTRAVENOUS CONTINUOUS
Status: DISCONTINUED | OUTPATIENT
Start: 2023-01-01 | End: 2023-01-01

## 2023-01-01 RX ORDER — MORPHINE SULFATE 15 MG/1
30 TABLET ORAL EVERY 6 HOURS PRN
Status: DISCONTINUED | OUTPATIENT
Start: 2023-01-01 | End: 2023-01-01

## 2023-01-01 RX ORDER — BUPIVACAINE HYDROCHLORIDE 5 MG/ML
INJECTION, SOLUTION EPIDURAL; INTRACAUDAL
Status: DISCONTINUED | OUTPATIENT
Start: 2023-01-01 | End: 2023-01-01 | Stop reason: SURG

## 2023-01-01 RX ORDER — AMOXICILLIN 250 MG
2 CAPSULE ORAL 2 TIMES DAILY
Status: DISCONTINUED | OUTPATIENT
Start: 2023-01-01 | End: 2023-01-01 | Stop reason: HOSPADM

## 2023-01-01 RX ORDER — DIPHENHYDRAMINE HYDROCHLORIDE 50 MG/ML
12.5 INJECTION INTRAMUSCULAR; INTRAVENOUS
Status: DISCONTINUED | OUTPATIENT
Start: 2023-01-01 | End: 2023-01-01 | Stop reason: HOSPADM

## 2023-01-01 RX ORDER — OMEPRAZOLE 20 MG/1
20 CAPSULE, DELAYED RELEASE ORAL DAILY
Status: CANCELLED | OUTPATIENT
Start: 2023-01-01

## 2023-01-01 RX ORDER — SODIUM CHLORIDE, SODIUM LACTATE, POTASSIUM CHLORIDE, AND CALCIUM CHLORIDE .6; .31; .03; .02 G/100ML; G/100ML; G/100ML; G/100ML
1000 INJECTION, SOLUTION INTRAVENOUS ONCE
Status: COMPLETED | OUTPATIENT
Start: 2023-01-01 | End: 2023-01-01

## 2023-01-01 RX ORDER — SODIUM CHLORIDE 1 G/1
2 TABLET ORAL
Status: DISCONTINUED | OUTPATIENT
Start: 2023-01-01 | End: 2023-01-01

## 2023-01-01 RX ORDER — TRAMADOL HYDROCHLORIDE 50 MG/1
50 TABLET ORAL EVERY 6 HOURS PRN
Status: DISCONTINUED | OUTPATIENT
Start: 2023-01-01 | End: 2023-01-01 | Stop reason: HOSPADM

## 2023-01-01 RX ORDER — ONDANSETRON 2 MG/ML
4 INJECTION INTRAMUSCULAR; INTRAVENOUS
Status: DISCONTINUED | OUTPATIENT
Start: 2023-01-01 | End: 2023-01-01 | Stop reason: HOSPADM

## 2023-01-01 RX ORDER — FLUCONAZOLE 100 MG/1
100 TABLET ORAL DAILY
Status: DISCONTINUED | OUTPATIENT
Start: 2023-01-01 | End: 2023-01-01 | Stop reason: HOSPADM

## 2023-01-01 RX ORDER — DEXTROSE AND SODIUM CHLORIDE 5; .9 G/100ML; G/100ML
INJECTION, SOLUTION INTRAVENOUS CONTINUOUS
Status: DISCONTINUED | OUTPATIENT
Start: 2023-01-01 | End: 2023-01-01

## 2023-01-01 RX ORDER — FLUCONAZOLE 100 MG/1
200 TABLET ORAL DAILY
Status: COMPLETED | OUTPATIENT
Start: 2023-01-01 | End: 2023-01-01

## 2023-01-01 RX ORDER — DAPAGLIFLOZIN 10 MG/1
10 TABLET, FILM COATED ORAL DAILY
Qty: 30 TABLET | Refills: 2 | Status: SHIPPED
Start: 2023-01-01 | End: 2023-12-29

## 2023-01-01 RX ORDER — VITAMIN B COMPLEX
1000 TABLET ORAL DAILY
Status: DISCONTINUED | OUTPATIENT
Start: 2023-01-01 | End: 2023-01-01 | Stop reason: HOSPADM

## 2023-01-01 RX ORDER — HYDROMORPHONE HYDROCHLORIDE 1 MG/ML
0.5 INJECTION, SOLUTION INTRAMUSCULAR; INTRAVENOUS; SUBCUTANEOUS
Status: DISCONTINUED | OUTPATIENT
Start: 2023-01-01 | End: 2023-01-01

## 2023-01-01 RX ORDER — POLYETHYLENE GLYCOL 3350 17 G/17G
1 POWDER, FOR SOLUTION ORAL
Status: DISCONTINUED | OUTPATIENT
Start: 2023-01-01 | End: 2023-01-01 | Stop reason: HOSPADM

## 2023-01-01 RX ORDER — MIDODRINE HYDROCHLORIDE 5 MG/1
10 TABLET ORAL
Status: DISCONTINUED | OUTPATIENT
Start: 2023-01-01 | End: 2023-01-01

## 2023-01-01 RX ORDER — PREGABALIN 100 MG/1
CAPSULE ORAL
Status: COMPLETED
Start: 2023-01-01 | End: 2023-01-01

## 2023-01-01 RX ORDER — ZOLPIDEM TARTRATE 5 MG/1
5 TABLET ORAL NIGHTLY PRN
Status: DISCONTINUED | OUTPATIENT
Start: 2023-01-01 | End: 2023-01-01 | Stop reason: HOSPADM

## 2023-01-01 RX ORDER — ONDANSETRON 4 MG/1
4 TABLET, ORALLY DISINTEGRATING ORAL 4 TIMES DAILY PRN
Status: DISCONTINUED | OUTPATIENT
Start: 2023-01-01 | End: 2023-01-01 | Stop reason: HOSPADM

## 2023-01-01 RX ORDER — OXYCODONE HYDROCHLORIDE 5 MG/1
5 TABLET ORAL
Status: DISCONTINUED | OUTPATIENT
Start: 2023-01-01 | End: 2023-01-01 | Stop reason: HOSPADM

## 2023-01-01 RX ORDER — MIRTAZAPINE 15 MG/1
7.5 TABLET, FILM COATED ORAL NIGHTLY PRN
Status: DISCONTINUED | OUTPATIENT
Start: 2023-01-01 | End: 2023-01-01 | Stop reason: HOSPADM

## 2023-01-01 RX ORDER — OMEPRAZOLE 20 MG/1
20 CAPSULE, DELAYED RELEASE ORAL DAILY
Status: DISCONTINUED | OUTPATIENT
Start: 2023-01-01 | End: 2023-01-01

## 2023-01-01 RX ORDER — ALBUTEROL SULFATE 90 UG/1
2 AEROSOL, METERED RESPIRATORY (INHALATION) EVERY 6 HOURS PRN
Status: DISCONTINUED | OUTPATIENT
Start: 2023-01-01 | End: 2023-01-01

## 2023-01-01 RX ORDER — TOBRAMYCIN 1.2 G/30ML
INJECTION, POWDER, LYOPHILIZED, FOR SOLUTION INTRAVENOUS
Status: DISCONTINUED | OUTPATIENT
Start: 2023-01-01 | End: 2023-01-01 | Stop reason: HOSPADM

## 2023-01-01 RX ORDER — OXYCODONE HCL 5 MG/5 ML
10 SOLUTION, ORAL ORAL
Status: COMPLETED | OUTPATIENT
Start: 2023-01-01 | End: 2023-01-01

## 2023-01-01 RX ORDER — FUROSEMIDE 20 MG/1
20 TABLET ORAL 2 TIMES DAILY PRN
Qty: 180 TABLET | Refills: 3 | Status: ON HOLD | OUTPATIENT
Start: 2023-01-01 | End: 2023-01-01 | Stop reason: SDUPTHER

## 2023-01-01 RX ORDER — HYDROMORPHONE HYDROCHLORIDE 1 MG/ML
0.2 INJECTION, SOLUTION INTRAMUSCULAR; INTRAVENOUS; SUBCUTANEOUS
Status: DISCONTINUED | OUTPATIENT
Start: 2023-01-01 | End: 2023-01-01 | Stop reason: HOSPADM

## 2023-01-01 RX ORDER — GABAPENTIN 300 MG/1
300 CAPSULE ORAL 3 TIMES DAILY
Status: DISCONTINUED | OUTPATIENT
Start: 2023-01-01 | End: 2023-01-01 | Stop reason: HOSPADM

## 2023-01-01 RX ORDER — PREGABALIN 150 MG/1
150 CAPSULE ORAL 3 TIMES DAILY
Qty: 48 CAPSULE | Refills: 0 | Status: SHIPPED | OUTPATIENT
Start: 2023-01-01 | End: 2023-01-01

## 2023-01-01 RX ORDER — SODIUM CHLORIDE 9 MG/ML
250 INJECTION, SOLUTION INTRAVENOUS ONCE
Status: DISCONTINUED | OUTPATIENT
Start: 2023-01-01 | End: 2023-01-01

## 2023-01-01 RX ORDER — DRONABINOL 2.5 MG/1
2.5 CAPSULE ORAL 2 TIMES DAILY PRN
Status: CANCELLED | OUTPATIENT
Start: 2023-01-01 | End: 2023-01-01

## 2023-01-01 RX ORDER — ALBUTEROL SULFATE 2.5 MG/3ML
2.5 SOLUTION RESPIRATORY (INHALATION)
Status: DISCONTINUED | OUTPATIENT
Start: 2023-01-01 | End: 2023-01-01 | Stop reason: HOSPADM

## 2023-01-01 RX ORDER — AMIODARONE HYDROCHLORIDE 200 MG/1
400 TABLET ORAL
Status: DISCONTINUED | OUTPATIENT
Start: 2023-01-01 | End: 2023-01-01

## 2023-01-01 RX ORDER — DEXTROSE AND SODIUM CHLORIDE 5; .9 G/100ML; G/100ML
INJECTION, SOLUTION INTRAVENOUS
Status: ACTIVE | OUTPATIENT
Start: 2023-01-01 | End: 2023-01-01

## 2023-01-01 RX ORDER — DULOXETIN HYDROCHLORIDE 60 MG/1
60 CAPSULE, DELAYED RELEASE ORAL DAILY
Qty: 30 CAPSULE | Refills: 2 | Status: ON HOLD | OUTPATIENT
Start: 2023-01-01 | End: 2023-01-01 | Stop reason: SDUPTHER

## 2023-01-01 RX ORDER — MEPERIDINE HYDROCHLORIDE 25 MG/ML
12.5 INJECTION INTRAMUSCULAR; INTRAVENOUS; SUBCUTANEOUS
Status: DISCONTINUED | OUTPATIENT
Start: 2023-01-01 | End: 2023-01-01 | Stop reason: HOSPADM

## 2023-01-01 RX ORDER — AMOXICILLIN 250 MG
2 CAPSULE ORAL 2 TIMES DAILY
Qty: 30 TABLET | Refills: 0 | Status: SHIPPED
Start: 2023-01-01 | End: 2023-12-29

## 2023-01-01 RX ORDER — SODIUM CHLORIDE 9 MG/ML
500 INJECTION, SOLUTION INTRAVENOUS ONCE
Status: COMPLETED | OUTPATIENT
Start: 2023-01-01 | End: 2023-01-01

## 2023-01-01 RX ORDER — CLINDAMYCIN PHOSPHATE 600 MG/50ML
600 INJECTION, SOLUTION INTRAVENOUS EVERY 8 HOURS
Status: DISCONTINUED | OUTPATIENT
Start: 2023-01-01 | End: 2023-01-01

## 2023-01-01 RX ORDER — FUROSEMIDE 20 MG/1
20 TABLET ORAL
Status: DISCONTINUED | OUTPATIENT
Start: 2023-01-01 | End: 2023-01-01

## 2023-01-01 RX ORDER — SODIUM CHLORIDE, SODIUM LACTATE, POTASSIUM CHLORIDE, CALCIUM CHLORIDE 600; 310; 30; 20 MG/100ML; MG/100ML; MG/100ML; MG/100ML
1000 INJECTION, SOLUTION INTRAVENOUS ONCE
Status: COMPLETED | OUTPATIENT
Start: 2023-01-01 | End: 2023-01-01

## 2023-01-01 RX ORDER — SODIUM CHLORIDE, SODIUM LACTATE, POTASSIUM CHLORIDE, AND CALCIUM CHLORIDE .6; .31; .03; .02 G/100ML; G/100ML; G/100ML; G/100ML
30 INJECTION, SOLUTION INTRAVENOUS ONCE
Status: COMPLETED | OUTPATIENT
Start: 2023-01-01 | End: 2023-01-01

## 2023-01-01 RX ORDER — CEFAZOLIN SODIUM 1 G/3ML
2 INJECTION, POWDER, FOR SOLUTION INTRAMUSCULAR; INTRAVENOUS ONCE
Status: COMPLETED | OUTPATIENT
Start: 2023-01-01 | End: 2023-01-01

## 2023-01-01 RX ORDER — VITAMIN B COMPLEX
1000 TABLET ORAL DAILY
Qty: 30 TABLET | Refills: 2 | Status: ON HOLD | OUTPATIENT
Start: 2023-01-01 | End: 2023-01-01

## 2023-01-01 RX ORDER — FUROSEMIDE 10 MG/ML
40 INJECTION INTRAMUSCULAR; INTRAVENOUS
Status: DISCONTINUED | OUTPATIENT
Start: 2023-01-01 | End: 2023-01-01 | Stop reason: HOSPADM

## 2023-01-01 RX ORDER — HYDRALAZINE HYDROCHLORIDE 25 MG/1
25 TABLET, FILM COATED ORAL EVERY 8 HOURS PRN
Status: DISCONTINUED | OUTPATIENT
Start: 2023-01-01 | End: 2023-01-01 | Stop reason: HOSPADM

## 2023-01-01 RX ORDER — OMEPRAZOLE 20 MG/1
40 CAPSULE, DELAYED RELEASE ORAL DAILY
Status: DISCONTINUED | OUTPATIENT
Start: 2023-01-01 | End: 2023-01-01 | Stop reason: HOSPADM

## 2023-01-01 RX ORDER — IBUPROFEN 200 MG
950 CAPSULE ORAL DAILY
Status: CANCELLED | OUTPATIENT
Start: 2023-01-01

## 2023-01-01 RX ORDER — METHYLPREDNISOLONE 4 MG/1
4 TABLET ORAL
Status: COMPLETED | OUTPATIENT
Start: 2023-01-01 | End: 2023-01-01

## 2023-01-01 RX ORDER — LIDOCAINE 50 MG/G
2 PATCH TOPICAL EVERY 24 HOURS
Qty: 10 PATCH | Refills: 2 | Status: ON HOLD | OUTPATIENT
Start: 2023-01-01 | End: 2023-01-01

## 2023-01-01 RX ORDER — NOREPINEPHRINE BITARTRATE 0.03 MG/ML
INJECTION, SOLUTION INTRAVENOUS
Status: ACTIVE
Start: 2023-01-01 | End: 2023-01-01

## 2023-01-01 RX ORDER — HEPARIN SODIUM 5000 [USP'U]/ML
5000 INJECTION, SOLUTION INTRAVENOUS; SUBCUTANEOUS EVERY 8 HOURS
Status: DISCONTINUED | OUTPATIENT
Start: 2023-01-01 | End: 2023-01-01

## 2023-01-01 RX ORDER — POTASSIUM CHLORIDE 14.9 MG/ML
20 INJECTION INTRAVENOUS ONCE
Status: COMPLETED | OUTPATIENT
Start: 2023-01-01 | End: 2023-01-01

## 2023-01-01 RX ORDER — PREGABALIN 150 MG/1
150 CAPSULE ORAL 3 TIMES DAILY
Qty: 90 CAPSULE | Refills: 0 | Status: SHIPPED | OUTPATIENT
Start: 2023-01-01 | End: 2023-01-01

## 2023-01-01 RX ORDER — MORPHINE SULFATE 15 MG/1
15 TABLET, FILM COATED, EXTENDED RELEASE ORAL EVERY 12 HOURS
Status: DISCONTINUED | OUTPATIENT
Start: 2023-01-01 | End: 2023-01-01

## 2023-01-01 RX ORDER — MAGNESIUM SULFATE HEPTAHYDRATE 40 MG/ML
2 INJECTION, SOLUTION INTRAVENOUS ONCE
Status: COMPLETED | OUTPATIENT
Start: 2023-01-01 | End: 2023-01-01

## 2023-01-01 RX ORDER — CARBOXYMETHYLCELLULOSE SODIUM 5 MG/ML
1 SOLUTION/ DROPS OPHTHALMIC PRN
Status: ON HOLD
Start: 2023-01-01 | End: 2023-01-01

## 2023-01-01 RX ORDER — MORPHINE SULFATE 15 MG/1
30 TABLET ORAL EVERY 4 HOURS
Status: DISCONTINUED | OUTPATIENT
Start: 2023-01-01 | End: 2023-01-01

## 2023-01-01 RX ORDER — NYSTATIN 100000 [USP'U]/G
1 POWDER TOPICAL 3 TIMES DAILY
Qty: 60 G | Refills: 1 | Status: SHIPPED
Start: 2023-01-01 | End: 2023-01-01

## 2023-01-01 RX ORDER — OXYCODONE HYDROCHLORIDE 10 MG/1
10 TABLET ORAL EVERY 4 HOURS PRN
Status: DISCONTINUED | OUTPATIENT
Start: 2023-01-01 | End: 2023-01-01 | Stop reason: HOSPADM

## 2023-01-01 RX ORDER — GAUZE BANDAGE 2" X 2"
100 BANDAGE TOPICAL DAILY
Status: DISCONTINUED | OUTPATIENT
Start: 2023-01-01 | End: 2023-01-01 | Stop reason: HOSPADM

## 2023-01-01 RX ORDER — POTASSIUM CHLORIDE 20 MEQ/1
10 TABLET, EXTENDED RELEASE ORAL DAILY
Status: DISCONTINUED | OUTPATIENT
Start: 2023-01-01 | End: 2023-01-01 | Stop reason: HOSPADM

## 2023-01-01 RX ORDER — ACETAMINOPHEN 325 MG/1
650 TABLET ORAL EVERY 6 HOURS PRN
Status: DISCONTINUED | OUTPATIENT
Start: 2023-01-01 | End: 2023-01-01

## 2023-01-01 RX ORDER — HYDROMORPHONE HYDROCHLORIDE 1 MG/ML
1 INJECTION, SOLUTION INTRAMUSCULAR; INTRAVENOUS; SUBCUTANEOUS
Status: COMPLETED | OUTPATIENT
Start: 2023-01-01 | End: 2023-01-01

## 2023-01-01 RX ORDER — AMOXICILLIN AND CLAVULANATE POTASSIUM 875; 125 MG/1; MG/1
1 TABLET, FILM COATED ORAL 2 TIMES DAILY
Qty: 28 TABLET | Refills: 0 | Status: ON HOLD
Start: 2023-01-01 | End: 2023-01-01

## 2023-01-01 RX ORDER — POTASSIUM CHLORIDE 20 MEQ/1
20 TABLET, EXTENDED RELEASE ORAL DAILY
Status: DISCONTINUED | OUTPATIENT
Start: 2023-01-01 | End: 2023-01-01

## 2023-01-01 RX ORDER — ECHINACEA PURPUREA EXTRACT 125 MG
2 TABLET ORAL
Status: DISCONTINUED | OUTPATIENT
Start: 2023-01-01 | End: 2023-01-01 | Stop reason: HOSPADM

## 2023-01-01 RX ORDER — POTASSIUM CHLORIDE 750 MG/1
10 TABLET, EXTENDED RELEASE ORAL DAILY
Qty: 30 EACH | Refills: 0 | Status: ON HOLD | OUTPATIENT
Start: 2023-01-01 | End: 2023-01-01 | Stop reason: SDUPTHER

## 2023-01-01 RX ORDER — OXYCODONE HYDROCHLORIDE 5 MG/1
5 TABLET ORAL
Status: DISCONTINUED | OUTPATIENT
Start: 2023-01-01 | End: 2023-01-01

## 2023-01-01 RX ORDER — SULFAMETHOXAZOLE AND TRIMETHOPRIM 800; 160 MG/1; MG/1
1 TABLET ORAL 2 TIMES DAILY
Qty: 14 TABLET | Refills: 0 | Status: SHIPPED | OUTPATIENT
Start: 2023-01-01 | End: 2023-01-01

## 2023-01-01 RX ORDER — DEXMEDETOMIDINE HYDROCHLORIDE 100 UG/ML
INJECTION, SOLUTION INTRAVENOUS PRN
Status: DISCONTINUED | OUTPATIENT
Start: 2023-01-01 | End: 2023-01-01 | Stop reason: SURG

## 2023-01-01 RX ORDER — TRAZODONE HYDROCHLORIDE 50 MG/1
50 TABLET ORAL
Status: DISCONTINUED | OUTPATIENT
Start: 2023-01-01 | End: 2023-01-01 | Stop reason: HOSPADM

## 2023-01-01 RX ORDER — SODIUM CHLORIDE 9 MG/ML
1000 INJECTION, SOLUTION INTRAVENOUS ONCE
Status: DISCONTINUED | OUTPATIENT
Start: 2023-01-01 | End: 2023-01-01

## 2023-01-01 RX ORDER — POTASSIUM CHLORIDE 20 MEQ/1
40 TABLET, EXTENDED RELEASE ORAL ONCE
Status: COMPLETED | OUTPATIENT
Start: 2023-01-01 | End: 2023-01-01

## 2023-01-01 RX ORDER — DULOXETIN HYDROCHLORIDE 60 MG/1
60 CAPSULE, DELAYED RELEASE ORAL DAILY
Status: DISCONTINUED | OUTPATIENT
Start: 2023-01-01 | End: 2023-01-01 | Stop reason: HOSPADM

## 2023-01-01 RX ORDER — DOCUSATE SODIUM 50 MG/5ML
10 LIQUID ORAL ONCE
Status: COMPLETED | OUTPATIENT
Start: 2023-01-01 | End: 2023-01-01

## 2023-01-01 RX ORDER — MIRTAZAPINE 7.5 MG/1
7.5 TABLET, FILM COATED ORAL NIGHTLY PRN
Qty: 30 TABLET | Status: ON HOLD
Start: 2023-01-01 | End: 2023-01-01

## 2023-01-01 RX ORDER — MORPHINE SULFATE 15 MG/1
15 TABLET, FILM COATED, EXTENDED RELEASE ORAL 2 TIMES DAILY
Status: CANCELLED | OUTPATIENT
Start: 2023-01-01

## 2023-01-01 RX ORDER — TRAZODONE HYDROCHLORIDE 50 MG/1
50 TABLET ORAL
Status: DISCONTINUED | OUTPATIENT
Start: 2023-01-01 | End: 2023-01-01

## 2023-01-01 RX ORDER — SODIUM CHLORIDE 9 MG/ML
1000 INJECTION, SOLUTION INTRAVENOUS ONCE
Status: COMPLETED | OUTPATIENT
Start: 2023-01-01 | End: 2023-01-01

## 2023-01-01 RX ORDER — MORPHINE SULFATE 15 MG/1
30 TABLET ORAL EVERY 6 HOURS
Status: DISCONTINUED | OUTPATIENT
Start: 2023-01-01 | End: 2023-01-01 | Stop reason: HOSPADM

## 2023-01-01 RX ORDER — MORPHINE SULFATE 15 MG/1
15 TABLET ORAL EVERY 4 HOURS
COMMUNITY
Start: 2023-01-01

## 2023-01-01 RX ORDER — SODIUM CHLORIDE, SODIUM LACTATE, POTASSIUM CHLORIDE, AND CALCIUM CHLORIDE .6; .31; .03; .02 G/100ML; G/100ML; G/100ML; G/100ML
500 INJECTION, SOLUTION INTRAVENOUS ONCE
Status: COMPLETED | OUTPATIENT
Start: 2023-01-01 | End: 2023-01-01

## 2023-01-01 RX ORDER — ACETAMINOPHEN 500 MG
1000 TABLET ORAL 3 TIMES DAILY
Status: DISCONTINUED | OUTPATIENT
Start: 2023-01-01 | End: 2023-01-01

## 2023-01-01 RX ORDER — ONDANSETRON 2 MG/ML
INJECTION INTRAMUSCULAR; INTRAVENOUS PRN
Status: DISCONTINUED | OUTPATIENT
Start: 2023-01-01 | End: 2023-01-01 | Stop reason: SURG

## 2023-01-01 RX ORDER — DAPAGLIFLOZIN 10 MG/1
10 TABLET, FILM COATED ORAL DAILY
Status: DISCONTINUED | OUTPATIENT
Start: 2023-01-01 | End: 2023-01-01 | Stop reason: HOSPADM

## 2023-01-01 RX ORDER — POTASSIUM CHLORIDE 20 MEQ/1
20 TABLET, EXTENDED RELEASE ORAL 2 TIMES DAILY
Status: DISCONTINUED | OUTPATIENT
Start: 2023-01-01 | End: 2023-01-01

## 2023-01-01 RX ORDER — OXYCODONE HYDROCHLORIDE 15 MG/1
15 TABLET ORAL EVERY 6 HOURS PRN
Status: DISCONTINUED | OUTPATIENT
Start: 2023-01-01 | End: 2023-01-01

## 2023-01-01 RX ORDER — CEFDINIR 300 MG/1
300 CAPSULE ORAL EVERY 12 HOURS
Status: DISCONTINUED | OUTPATIENT
Start: 2023-01-01 | End: 2023-01-01 | Stop reason: HOSPADM

## 2023-01-01 RX ORDER — HALOPERIDOL 5 MG/ML
1 INJECTION INTRAMUSCULAR
Status: DISCONTINUED | OUTPATIENT
Start: 2023-01-01 | End: 2023-01-01 | Stop reason: HOSPADM

## 2023-01-01 RX ORDER — PREDNISONE 5 MG/1
10 TABLET ORAL 2 TIMES DAILY
Status: DISCONTINUED | OUTPATIENT
Start: 2023-01-01 | End: 2023-01-01 | Stop reason: HOSPADM

## 2023-01-01 RX ORDER — LINEZOLID 2 MG/ML
600 INJECTION, SOLUTION INTRAVENOUS 2 TIMES DAILY
Status: DISCONTINUED | OUTPATIENT
Start: 2023-01-01 | End: 2023-01-01

## 2023-01-01 RX ORDER — DULOXETIN HYDROCHLORIDE 60 MG/1
60 CAPSULE, DELAYED RELEASE ORAL DAILY
Status: DISCONTINUED | OUTPATIENT
Start: 2023-01-01 | End: 2023-01-01

## 2023-01-01 RX ORDER — POTASSIUM CHLORIDE 750 MG/1
10 TABLET, EXTENDED RELEASE ORAL DAILY
Qty: 30 EACH | Refills: 2 | Status: ON HOLD | OUTPATIENT
Start: 2023-01-01 | End: 2023-01-01

## 2023-01-01 RX ORDER — FUROSEMIDE 20 MG/1
20 TABLET ORAL DAILY
Qty: 30 TABLET | Refills: 2 | Status: SHIPPED | OUTPATIENT
Start: 2023-01-01 | End: 2023-01-01 | Stop reason: SDUPTHER

## 2023-01-01 RX ORDER — HYDROMORPHONE HYDROCHLORIDE 1 MG/ML
1 INJECTION, SOLUTION INTRAMUSCULAR; INTRAVENOUS; SUBCUTANEOUS EVERY 4 HOURS PRN
Status: DISCONTINUED | OUTPATIENT
Start: 2023-01-01 | End: 2023-01-01

## 2023-01-01 RX ORDER — CLINDAMYCIN PHOSPHATE 600 MG/50ML
600 INJECTION, SOLUTION INTRAVENOUS ONCE
Status: DISCONTINUED | OUTPATIENT
Start: 2023-01-01 | End: 2023-01-01

## 2023-01-01 RX ORDER — HYDROMORPHONE HYDROCHLORIDE 1 MG/ML
.5-1 INJECTION, SOLUTION INTRAMUSCULAR; INTRAVENOUS; SUBCUTANEOUS EVERY 4 HOURS PRN
Status: DISCONTINUED | OUTPATIENT
Start: 2023-01-01 | End: 2023-01-01

## 2023-01-01 RX ORDER — SODIUM CHLORIDE, SODIUM LACTATE, POTASSIUM CHLORIDE, CALCIUM CHLORIDE 600; 310; 30; 20 MG/100ML; MG/100ML; MG/100ML; MG/100ML
500 INJECTION, SOLUTION INTRAVENOUS ONCE
Status: COMPLETED | OUTPATIENT
Start: 2023-01-01 | End: 2023-01-01

## 2023-01-01 RX ORDER — OXYCODONE HYDROCHLORIDE 10 MG/1
10 TABLET ORAL ONCE
Status: COMPLETED | OUTPATIENT
Start: 2023-01-01 | End: 2023-01-01

## 2023-01-01 RX ORDER — SODIUM CHLORIDE, SODIUM LACTATE, POTASSIUM CHLORIDE, CALCIUM CHLORIDE 600; 310; 30; 20 MG/100ML; MG/100ML; MG/100ML; MG/100ML
INJECTION, SOLUTION INTRAVENOUS CONTINUOUS
Status: DISCONTINUED | OUTPATIENT
Start: 2023-01-01 | End: 2023-01-01 | Stop reason: HOSPADM

## 2023-01-01 RX ORDER — NOREPINEPHRINE BITARTRATE 0.03 MG/ML
0-1 INJECTION, SOLUTION INTRAVENOUS CONTINUOUS
Status: DISCONTINUED | OUTPATIENT
Start: 2023-01-01 | End: 2023-01-01

## 2023-01-01 RX ORDER — PETROLATUM 42 G/100G
OINTMENT TOPICAL 3 TIMES DAILY PRN
Status: DISCONTINUED | OUTPATIENT
Start: 2023-01-01 | End: 2023-01-01 | Stop reason: HOSPADM

## 2023-01-01 RX ORDER — MORPHINE SULFATE 30 MG/1
30 TABLET ORAL 4 TIMES DAILY
Status: ON HOLD | COMMUNITY
Start: 2023-01-01 | End: 2023-01-01 | Stop reason: SDUPTHER

## 2023-01-01 RX ORDER — MIDODRINE HYDROCHLORIDE 5 MG/1
15 TABLET ORAL
Status: DISCONTINUED | OUTPATIENT
Start: 2023-01-01 | End: 2023-01-01

## 2023-01-01 RX ORDER — AMOXICILLIN 250 MG
2 CAPSULE ORAL 2 TIMES DAILY
Status: CANCELLED | OUTPATIENT
Start: 2023-01-01

## 2023-01-01 RX ORDER — OXYCODONE HCL 5 MG/5 ML
5 SOLUTION, ORAL ORAL
Status: COMPLETED | OUTPATIENT
Start: 2023-01-01 | End: 2023-01-01

## 2023-01-01 RX ORDER — ERGOCALCIFEROL 1.25 MG/1
50000 CAPSULE ORAL
Status: DISCONTINUED | OUTPATIENT
Start: 2023-01-01 | End: 2023-01-01 | Stop reason: HOSPADM

## 2023-01-01 RX ORDER — MORPHINE SULFATE 15 MG/1
15 TABLET, FILM COATED, EXTENDED RELEASE ORAL 2 TIMES DAILY
Qty: 28 TABLET | Refills: 0 | Status: SHIPPED | OUTPATIENT
Start: 2023-01-01 | End: 2023-01-01

## 2023-01-01 RX ORDER — MORPHINE SULFATE 30 MG/1
30 TABLET ORAL 4 TIMES DAILY
Status: ON HOLD | COMMUNITY
Start: 2023-01-01 | End: 2023-01-01

## 2023-01-01 RX ORDER — PREGABALIN 100 MG/1
100 CAPSULE ORAL 3 TIMES DAILY
Status: DISCONTINUED | OUTPATIENT
Start: 2023-01-01 | End: 2023-01-01

## 2023-01-01 RX ORDER — AMOXICILLIN AND CLAVULANATE POTASSIUM 875; 125 MG/1; MG/1
TABLET, FILM COATED ORAL
Status: ON HOLD | COMMUNITY
Start: 2023-01-01 | End: 2023-01-01

## 2023-01-01 RX ORDER — M-VIT,TX,IRON,MINS/CALC/FOLIC 27MG-0.4MG
1 TABLET ORAL
Status: DISCONTINUED | OUTPATIENT
Start: 2023-01-01 | End: 2023-01-01 | Stop reason: HOSPADM

## 2023-01-01 RX ORDER — HYDRALAZINE HYDROCHLORIDE 20 MG/ML
5 INJECTION INTRAMUSCULAR; INTRAVENOUS
Status: DISCONTINUED | OUTPATIENT
Start: 2023-01-01 | End: 2023-01-01 | Stop reason: HOSPADM

## 2023-01-01 RX ORDER — FLUCONAZOLE 200 MG/1
200 TABLET ORAL
Qty: 4 TABLET | Refills: 0 | Status: SHIPPED | OUTPATIENT
Start: 2023-01-01 | End: 2023-01-01

## 2023-01-01 RX ORDER — KETOROLAC TROMETHAMINE 30 MG/ML
30 INJECTION, SOLUTION INTRAMUSCULAR; INTRAVENOUS EVERY 8 HOURS PRN
Status: DISCONTINUED | OUTPATIENT
Start: 2023-01-01 | End: 2023-01-01

## 2023-01-01 RX ORDER — POTASSIUM CHLORIDE 750 MG/1
TABLET, FILM COATED, EXTENDED RELEASE ORAL
Status: ON HOLD | COMMUNITY
Start: 2023-01-01 | End: 2023-01-01

## 2023-01-01 RX ORDER — HEPARIN SODIUM 1000 [USP'U]/ML
80 INJECTION, SOLUTION INTRAVENOUS; SUBCUTANEOUS ONCE
Status: COMPLETED | OUTPATIENT
Start: 2023-01-01 | End: 2023-01-01

## 2023-01-01 RX ORDER — CARBOXYMETHYLCELLULOSE SODIUM 5 MG/ML
1 SOLUTION/ DROPS OPHTHALMIC PRN
Status: DISCONTINUED | OUTPATIENT
Start: 2023-01-01 | End: 2023-01-01 | Stop reason: HOSPADM

## 2023-01-01 RX ORDER — IBUPROFEN 200 MG
950 CAPSULE ORAL DAILY
Status: DISCONTINUED | OUTPATIENT
Start: 2023-01-01 | End: 2023-01-01 | Stop reason: HOSPADM

## 2023-01-01 RX ORDER — POLYETHYLENE GLYCOL 3350 17 G/17G
17 POWDER, FOR SOLUTION ORAL
Refills: 3 | Status: ON HOLD
Start: 2023-01-01 | End: 2023-01-01

## 2023-01-01 RX ORDER — SIMETHICONE 125 MG
125 TABLET,CHEWABLE ORAL 3 TIMES DAILY PRN
Status: DISCONTINUED | OUTPATIENT
Start: 2023-01-01 | End: 2023-01-01 | Stop reason: HOSPADM

## 2023-01-01 RX ORDER — CLOTRIMAZOLE 1 %
CREAM (GRAM) TOPICAL 2 TIMES DAILY
Status: DISCONTINUED | OUTPATIENT
Start: 2023-01-01 | End: 2023-01-01

## 2023-01-01 RX ORDER — SODIUM CHLORIDE, SODIUM LACTATE, POTASSIUM CHLORIDE, CALCIUM CHLORIDE 600; 310; 30; 20 MG/100ML; MG/100ML; MG/100ML; MG/100ML
INJECTION, SOLUTION INTRAVENOUS CONTINUOUS
Status: ACTIVE | OUTPATIENT
Start: 2023-01-01 | End: 2023-01-01

## 2023-01-01 RX ORDER — FUROSEMIDE 10 MG/ML
60 INJECTION INTRAMUSCULAR; INTRAVENOUS EVERY 8 HOURS
Status: DISCONTINUED | OUTPATIENT
Start: 2023-01-01 | End: 2023-01-01

## 2023-01-01 RX ORDER — SODIUM CHLORIDE, SODIUM LACTATE, POTASSIUM CHLORIDE, CALCIUM CHLORIDE 600; 310; 30; 20 MG/100ML; MG/100ML; MG/100ML; MG/100ML
INJECTION, SOLUTION INTRAVENOUS
Status: DISCONTINUED | OUTPATIENT
Start: 2023-01-01 | End: 2023-01-01 | Stop reason: SURG

## 2023-01-01 RX ORDER — NALOXONE HYDROCHLORIDE 0.4 MG/ML
0.4 INJECTION, SOLUTION INTRAMUSCULAR; INTRAVENOUS; SUBCUTANEOUS
Status: DISCONTINUED | OUTPATIENT
Start: 2023-01-01 | End: 2023-01-01 | Stop reason: HOSPADM

## 2023-01-01 RX ORDER — OXYCODONE HCL 5 MG/5 ML
10 SOLUTION, ORAL ORAL
Status: DISCONTINUED | OUTPATIENT
Start: 2023-01-01 | End: 2023-01-01 | Stop reason: HOSPADM

## 2023-01-01 RX ORDER — CEFAZOLIN SODIUM 1 G/3ML
INJECTION, POWDER, FOR SOLUTION INTRAMUSCULAR; INTRAVENOUS PRN
Status: DISCONTINUED | OUTPATIENT
Start: 2023-01-01 | End: 2023-01-01 | Stop reason: SURG

## 2023-01-01 RX ORDER — ACETAMINOPHEN 500 MG
1000 TABLET ORAL 3 TIMES DAILY
Qty: 180 TABLET | Refills: 2 | Status: SHIPPED | OUTPATIENT
Start: 2023-01-01

## 2023-01-01 RX ORDER — HEPARIN SODIUM 5000 [USP'U]/ML
5000 INJECTION, SOLUTION INTRAVENOUS; SUBCUTANEOUS EVERY 8 HOURS
Status: CANCELLED | OUTPATIENT
Start: 2023-01-01

## 2023-01-01 RX ORDER — LINEZOLID 600 MG/1
600 TABLET, FILM COATED ORAL EVERY 12 HOURS
Status: DISCONTINUED | OUTPATIENT
Start: 2023-01-01 | End: 2023-01-01 | Stop reason: HOSPADM

## 2023-01-01 RX ORDER — DEXTROSE, SODIUM CHLORIDE, SODIUM LACTATE, POTASSIUM CHLORIDE, AND CALCIUM CHLORIDE 5; .6; .31; .03; .02 G/100ML; G/100ML; G/100ML; G/100ML; G/100ML
INJECTION, SOLUTION INTRAVENOUS CONTINUOUS
Status: DISCONTINUED | OUTPATIENT
Start: 2023-01-01 | End: 2023-01-01

## 2023-01-01 RX ORDER — OXYCODONE HYDROCHLORIDE 5 MG/1
5-10 TABLET ORAL EVERY 4 HOURS PRN
Status: DISCONTINUED | OUTPATIENT
Start: 2023-01-01 | End: 2023-01-01

## 2023-01-01 RX ORDER — ENOXAPARIN SODIUM 100 MG/ML
40 INJECTION SUBCUTANEOUS DAILY
Status: DISCONTINUED | OUTPATIENT
Start: 2023-01-01 | End: 2023-01-01

## 2023-01-01 RX ORDER — MORPHINE SULFATE 15 MG/1
15 TABLET ORAL EVERY 6 HOURS PRN
Status: DISCONTINUED | OUTPATIENT
Start: 2023-01-01 | End: 2023-01-01

## 2023-01-01 RX ORDER — MORPHINE SULFATE 15 MG/1
30 TABLET ORAL 3 TIMES DAILY
Status: DISCONTINUED | OUTPATIENT
Start: 2023-01-01 | End: 2023-01-01 | Stop reason: HOSPADM

## 2023-01-01 RX ORDER — SODIUM CHLORIDE 9 MG/ML
INJECTION, SOLUTION INTRAVENOUS CONTINUOUS
Status: DISCONTINUED | OUTPATIENT
Start: 2023-01-01 | End: 2023-01-01 | Stop reason: HOSPADM

## 2023-01-01 RX ORDER — DOXYCYCLINE HYCLATE 100 MG
100 TABLET ORAL 2 TIMES DAILY
Qty: 14 TABLET | Refills: 0 | Status: SHIPPED | OUTPATIENT
Start: 2023-01-01 | End: 2023-01-01

## 2023-01-01 RX ORDER — LABETALOL HYDROCHLORIDE 5 MG/ML
5 INJECTION, SOLUTION INTRAVENOUS
Status: DISCONTINUED | OUTPATIENT
Start: 2023-01-01 | End: 2023-01-01 | Stop reason: HOSPADM

## 2023-01-01 RX ORDER — IPRATROPIUM BROMIDE AND ALBUTEROL SULFATE 2.5; .5 MG/3ML; MG/3ML
3 SOLUTION RESPIRATORY (INHALATION)
Status: DISCONTINUED | OUTPATIENT
Start: 2023-01-01 | End: 2023-01-01 | Stop reason: HOSPADM

## 2023-01-01 RX ORDER — DRONABINOL 2.5 MG/1
2.5 CAPSULE ORAL 2 TIMES DAILY PRN
Status: ACTIVE | OUTPATIENT
Start: 2023-01-01 | End: 2023-01-01

## 2023-01-01 RX ORDER — HYDROMORPHONE HYDROCHLORIDE 2 MG/ML
4 INJECTION, SOLUTION INTRAMUSCULAR; INTRAVENOUS; SUBCUTANEOUS ONCE
Status: COMPLETED | OUTPATIENT
Start: 2023-01-01 | End: 2023-01-01

## 2023-01-01 RX ORDER — PROPOFOL 10 MG/ML
200 INJECTION, EMULSION INTRAVENOUS ONCE
Status: COMPLETED | OUTPATIENT
Start: 2023-01-01 | End: 2023-01-01

## 2023-01-01 RX ORDER — ACETAMINOPHEN 500 MG
1000 TABLET ORAL EVERY 8 HOURS
Status: DISCONTINUED | OUTPATIENT
Start: 2023-01-01 | End: 2023-01-01

## 2023-01-01 RX ORDER — PREGABALIN 200 MG/1
200 CAPSULE ORAL 3 TIMES DAILY
Qty: 42 CAPSULE | Refills: 0 | Status: SHIPPED | OUTPATIENT
Start: 2023-01-01 | End: 2023-01-01

## 2023-01-01 RX ORDER — PREGABALIN 150 MG/1
150 CAPSULE ORAL 3 TIMES DAILY
Status: DISCONTINUED | OUTPATIENT
Start: 2023-01-01 | End: 2023-01-01

## 2023-01-01 RX ORDER — BISACODYL 10 MG
10 SUPPOSITORY, RECTAL RECTAL
Status: CANCELLED | OUTPATIENT
Start: 2023-01-01

## 2023-01-01 RX ORDER — ALBUTEROL SULFATE 90 UG/1
2 AEROSOL, METERED RESPIRATORY (INHALATION) EVERY 6 HOURS PRN
Qty: 8.5 G | Refills: 0 | Status: ON HOLD | OUTPATIENT
Start: 2023-01-01 | End: 2023-01-01

## 2023-01-01 RX ORDER — CALCIUM GLUCONATE 20 MG/ML
1 INJECTION, SOLUTION INTRAVENOUS ONCE
Status: COMPLETED | OUTPATIENT
Start: 2023-01-01 | End: 2023-01-01

## 2023-01-01 RX ORDER — MEPERIDINE HYDROCHLORIDE 25 MG/ML
6.25 INJECTION INTRAMUSCULAR; INTRAVENOUS; SUBCUTANEOUS
Status: DISCONTINUED | OUTPATIENT
Start: 2023-01-01 | End: 2023-01-01 | Stop reason: HOSPADM

## 2023-01-01 RX ORDER — MIDAZOLAM HYDROCHLORIDE 1 MG/ML
INJECTION INTRAMUSCULAR; INTRAVENOUS
Status: COMPLETED
Start: 2023-01-01 | End: 2023-01-01

## 2023-01-01 RX ORDER — DEXTROSE MONOHYDRATE 25 G/50ML
INJECTION, SOLUTION INTRAVENOUS
Status: ACTIVE
Start: 2023-01-01 | End: 2023-01-01

## 2023-01-01 RX ORDER — ASPIRIN 81 MG/1
TABLET, CHEWABLE ORAL
Status: COMPLETED
Start: 2023-01-01 | End: 2023-01-01

## 2023-01-01 RX ORDER — MORPHINE SULFATE 15 MG/1
15 TABLET, FILM COATED, EXTENDED RELEASE ORAL 2 TIMES DAILY
Status: ON HOLD | COMMUNITY
Start: 2023-01-01 | End: 2023-01-01 | Stop reason: SDUPTHER

## 2023-01-01 RX ORDER — FUROSEMIDE 10 MG/ML
80 INJECTION INTRAMUSCULAR; INTRAVENOUS ONCE
Status: COMPLETED | OUTPATIENT
Start: 2023-01-01 | End: 2023-01-01

## 2023-01-01 RX ORDER — ONDANSETRON 2 MG/ML
4 INJECTION INTRAMUSCULAR; INTRAVENOUS EVERY 4 HOURS PRN
Status: DISCONTINUED | OUTPATIENT
Start: 2023-01-01 | End: 2023-01-01 | Stop reason: HOSPADM

## 2023-01-01 RX ORDER — ONDANSETRON 2 MG/ML
4 INJECTION INTRAMUSCULAR; INTRAVENOUS 4 TIMES DAILY PRN
Status: DISCONTINUED | OUTPATIENT
Start: 2023-01-01 | End: 2023-01-01

## 2023-01-01 RX ORDER — MORPHINE SULFATE 30 MG/1
30 TABLET, FILM COATED, EXTENDED RELEASE ORAL EVERY 12 HOURS
Qty: 28 TABLET | Refills: 0 | Status: SHIPPED | OUTPATIENT
Start: 2023-01-01 | End: 2023-01-01 | Stop reason: SDUPTHER

## 2023-01-01 RX ORDER — AMOXICILLIN AND CLAVULANATE POTASSIUM 875; 125 MG/1; MG/1
1 TABLET, FILM COATED ORAL EVERY 12 HOURS
Status: DISCONTINUED | OUTPATIENT
Start: 2023-01-01 | End: 2023-01-01

## 2023-01-01 RX ORDER — TORSEMIDE 20 MG/1
40 TABLET ORAL
Status: DISCONTINUED | OUTPATIENT
Start: 2023-01-01 | End: 2023-01-01

## 2023-01-01 RX ORDER — HEPARIN SODIUM 1000 [USP'U]/ML
INJECTION, SOLUTION INTRAVENOUS; SUBCUTANEOUS
Status: COMPLETED
Start: 2023-01-01 | End: 2023-01-01

## 2023-01-01 RX ORDER — HYDROMORPHONE HYDROCHLORIDE 1 MG/ML
0.1 INJECTION, SOLUTION INTRAMUSCULAR; INTRAVENOUS; SUBCUTANEOUS
Status: DISCONTINUED | OUTPATIENT
Start: 2023-01-01 | End: 2023-01-01 | Stop reason: HOSPADM

## 2023-01-01 RX ORDER — MORPHINE SULFATE 15 MG/1
30 TABLET ORAL 4 TIMES DAILY
Status: DISCONTINUED | OUTPATIENT
Start: 2023-01-01 | End: 2023-01-01

## 2023-01-01 RX ORDER — SODIUM HYPOCHLORITE 1.25 MG/ML
SOLUTION TOPICAL 2 TIMES DAILY
Status: DISCONTINUED | OUTPATIENT
Start: 2023-01-01 | End: 2023-01-01

## 2023-01-01 RX ORDER — SODIUM CHLORIDE 9 MG/ML
INJECTION, SOLUTION INTRAVENOUS
Status: DISCONTINUED | OUTPATIENT
Start: 2023-01-01 | End: 2023-01-01 | Stop reason: SURG

## 2023-01-01 RX ORDER — DAPAGLIFLOZIN 10 MG/1
10 TABLET, FILM COATED ORAL DAILY
Status: CANCELLED | OUTPATIENT
Start: 2023-01-01

## 2023-01-01 RX ORDER — KETOROLAC TROMETHAMINE 30 MG/ML
INJECTION, SOLUTION INTRAMUSCULAR; INTRAVENOUS PRN
Status: DISCONTINUED | OUTPATIENT
Start: 2023-01-01 | End: 2023-01-01 | Stop reason: SURG

## 2023-01-01 RX ORDER — SODIUM CHLORIDE 1 G/1
1 TABLET ORAL
Status: DISCONTINUED | OUTPATIENT
Start: 2023-01-01 | End: 2023-01-01

## 2023-01-01 RX ORDER — LIDOCAINE HYDROCHLORIDE 20 MG/ML
INJECTION, SOLUTION INFILTRATION; PERINEURAL
Status: COMPLETED
Start: 2023-01-01 | End: 2023-01-01

## 2023-01-01 RX ORDER — GABAPENTIN 300 MG/1
300 CAPSULE ORAL 3 TIMES DAILY
Status: DISCONTINUED | OUTPATIENT
Start: 2023-01-01 | End: 2023-01-01

## 2023-01-01 RX ORDER — ENEMA 19; 7 G/133ML; G/133ML
1 ENEMA RECTAL ONCE
Status: COMPLETED | OUTPATIENT
Start: 2023-01-01 | End: 2023-01-01

## 2023-01-01 RX ORDER — DULOXETIN HYDROCHLORIDE 60 MG/1
60 CAPSULE, DELAYED RELEASE ORAL DAILY
Status: CANCELLED | OUTPATIENT
Start: 2023-01-01

## 2023-01-01 RX ORDER — FUROSEMIDE 10 MG/ML
40 INJECTION INTRAMUSCULAR; INTRAVENOUS
Status: CANCELLED | OUTPATIENT
Start: 2023-01-01

## 2023-01-01 RX ORDER — HEPARIN SODIUM 200 [USP'U]/100ML
INJECTION, SOLUTION INTRAVENOUS
Status: COMPLETED
Start: 2023-01-01 | End: 2023-01-01

## 2023-01-01 RX ORDER — OMEPRAZOLE 20 MG/1
20 CAPSULE, DELAYED RELEASE ORAL DAILY
Qty: 30 CAPSULE | Refills: 2 | Status: SHIPPED
Start: 2023-01-01 | End: 2023-12-29

## 2023-01-01 RX ORDER — FUROSEMIDE 10 MG/ML
20 INJECTION INTRAMUSCULAR; INTRAVENOUS
Status: DISCONTINUED | OUTPATIENT
Start: 2023-01-01 | End: 2023-01-01

## 2023-01-01 RX ORDER — DULOXETIN HYDROCHLORIDE 30 MG/1
60 CAPSULE, DELAYED RELEASE ORAL DAILY
Status: CANCELLED | OUTPATIENT
Start: 2023-01-01

## 2023-01-01 RX ORDER — DEXAMETHASONE SODIUM PHOSPHATE 4 MG/ML
INJECTION, SOLUTION INTRA-ARTICULAR; INTRALESIONAL; INTRAMUSCULAR; INTRAVENOUS; SOFT TISSUE PRN
Status: DISCONTINUED | OUTPATIENT
Start: 2023-01-01 | End: 2023-01-01 | Stop reason: SURG

## 2023-01-01 RX ORDER — SODIUM CHLORIDE, SODIUM LACTATE, POTASSIUM CHLORIDE, CALCIUM CHLORIDE 600; 310; 30; 20 MG/100ML; MG/100ML; MG/100ML; MG/100ML
INJECTION, SOLUTION INTRAVENOUS CONTINUOUS
Status: DISCONTINUED | OUTPATIENT
Start: 2023-01-01 | End: 2023-01-01

## 2023-01-01 RX ORDER — DAPAGLIFLOZIN 10 MG/1
10 TABLET, FILM COATED ORAL DAILY
Status: DISCONTINUED | OUTPATIENT
Start: 2023-01-01 | End: 2023-01-01

## 2023-01-01 RX ORDER — CALCIUM GLUCONATE 20 MG/ML
1 INJECTION, SOLUTION INTRAVENOUS ONCE
Status: DISCONTINUED | OUTPATIENT
Start: 2023-01-01 | End: 2023-01-01

## 2023-01-01 RX ORDER — OMEPRAZOLE 20 MG/1
20 CAPSULE, DELAYED RELEASE ORAL DAILY
Status: DISCONTINUED | OUTPATIENT
Start: 2023-01-01 | End: 2023-01-01 | Stop reason: HOSPADM

## 2023-01-01 RX ORDER — METHOCARBAMOL 750 MG/1
750 TABLET, FILM COATED ORAL 3 TIMES DAILY PRN
Status: DISCONTINUED | OUTPATIENT
Start: 2023-01-01 | End: 2023-01-01 | Stop reason: HOSPADM

## 2023-01-01 RX ORDER — NYSTATIN 100000 [USP'U]/G
POWDER TOPICAL 2 TIMES DAILY
Status: DISCONTINUED | OUTPATIENT
Start: 2023-01-01 | End: 2023-01-01

## 2023-01-01 RX ORDER — MIDODRINE HYDROCHLORIDE 5 MG/1
10 TABLET ORAL ONCE
Status: COMPLETED | OUTPATIENT
Start: 2023-01-01 | End: 2023-01-01

## 2023-01-01 RX ORDER — HYDROMORPHONE HYDROCHLORIDE 1 MG/ML
1 INJECTION, SOLUTION INTRAMUSCULAR; INTRAVENOUS; SUBCUTANEOUS EVERY 6 HOURS PRN
Status: DISCONTINUED | OUTPATIENT
Start: 2023-01-01 | End: 2023-01-01

## 2023-01-01 RX ORDER — METHOCARBAMOL 750 MG/1
750 TABLET, FILM COATED ORAL 3 TIMES DAILY PRN
Status: CANCELLED | OUTPATIENT
Start: 2023-01-01

## 2023-01-01 RX ORDER — DEXAMETHASONE SODIUM PHOSPHATE 4 MG/ML
INJECTION, SOLUTION INTRA-ARTICULAR; INTRALESIONAL; INTRAMUSCULAR; INTRAVENOUS; SOFT TISSUE
Status: DISCONTINUED | OUTPATIENT
Start: 2023-01-01 | End: 2023-01-01 | Stop reason: SURG

## 2023-01-01 RX ORDER — DEXTROSE MONOHYDRATE 100 MG/ML
INJECTION, SOLUTION INTRAVENOUS
Status: COMPLETED
Start: 2023-01-01 | End: 2023-01-01

## 2023-01-01 RX ORDER — METHYLPREDNISOLONE 4 MG/1
8 TABLET ORAL
Status: COMPLETED | OUTPATIENT
Start: 2023-01-01 | End: 2023-01-01

## 2023-01-01 RX ORDER — MIDODRINE HYDROCHLORIDE 5 MG/1
5 TABLET ORAL
Status: COMPLETED | OUTPATIENT
Start: 2023-01-01 | End: 2023-01-01

## 2023-01-01 RX ORDER — METOPROLOL TARTRATE 1 MG/ML
1 INJECTION, SOLUTION INTRAVENOUS
Status: DISCONTINUED | OUTPATIENT
Start: 2023-01-01 | End: 2023-01-01 | Stop reason: HOSPADM

## 2023-01-01 RX ORDER — OXYCODONE HYDROCHLORIDE 5 MG/1
5 TABLET ORAL EVERY 4 HOURS PRN
Status: DISCONTINUED | OUTPATIENT
Start: 2023-01-01 | End: 2023-01-01 | Stop reason: HOSPADM

## 2023-01-01 RX ORDER — LACTULOSE 20 G/30ML
30 SOLUTION ORAL 3 TIMES DAILY
Status: CANCELLED | OUTPATIENT
Start: 2023-01-01

## 2023-01-01 RX ORDER — CHOLECALCIFEROL (VITAMIN D3) 125 MCG
5 CAPSULE ORAL ONCE
Status: COMPLETED | OUTPATIENT
Start: 2023-01-01 | End: 2023-01-01

## 2023-01-01 RX ORDER — POTASSIUM CHLORIDE 20 MEQ/1
40 TABLET, EXTENDED RELEASE ORAL 2 TIMES DAILY
Status: DISCONTINUED | OUTPATIENT
Start: 2023-01-01 | End: 2023-01-01

## 2023-01-01 RX ORDER — MIRTAZAPINE 15 MG/1
7.5 TABLET, ORALLY DISINTEGRATING ORAL NIGHTLY PRN
Status: DISCONTINUED | OUTPATIENT
Start: 2023-01-01 | End: 2023-01-01 | Stop reason: HOSPADM

## 2023-01-01 RX ORDER — ERGOCALCIFEROL 1.25 MG/1
50000 CAPSULE ORAL
Qty: 5 CAPSULE | Status: SHIPPED
Start: 2023-01-01 | End: 2023-01-01

## 2023-01-01 RX ORDER — CEFEPIME HYDROCHLORIDE 2 G/1
2 INJECTION, POWDER, FOR SOLUTION INTRAVENOUS EVERY 8 HOURS
COMMUNITY
Start: 2023-01-01 | End: 2024-02-01

## 2023-01-01 RX ORDER — GAUZE BANDAGE 2" X 2"
100 BANDAGE TOPICAL DAILY
Status: CANCELLED | OUTPATIENT
Start: 2023-01-01

## 2023-01-01 RX ORDER — DULOXETIN HYDROCHLORIDE 60 MG/1
60 CAPSULE, DELAYED RELEASE ORAL DAILY
Qty: 30 CAPSULE | Refills: 0 | Status: ON HOLD
Start: 2023-01-01 | End: 2023-01-01 | Stop reason: SDUPTHER

## 2023-01-01 RX ORDER — FUROSEMIDE 20 MG/1
20 TABLET ORAL 2 TIMES DAILY PRN
Qty: 180 TABLET | Refills: 3 | Status: SHIPPED
Start: 2023-01-01 | End: 2023-01-01

## 2023-01-01 RX ORDER — LIDOCAINE 50 MG/G
2 PATCH TOPICAL EVERY 24 HOURS
Status: DISCONTINUED | OUTPATIENT
Start: 2023-01-01 | End: 2023-01-01 | Stop reason: HOSPADM

## 2023-01-01 RX ORDER — LACTULOSE 20 G/30ML
30 SOLUTION ORAL 3 TIMES DAILY
Status: DISCONTINUED | OUTPATIENT
Start: 2023-01-01 | End: 2023-01-01 | Stop reason: HOSPADM

## 2023-01-01 RX ORDER — DULOXETIN HYDROCHLORIDE 60 MG/1
60 CAPSULE, DELAYED RELEASE ORAL DAILY
Qty: 30 CAPSULE | Refills: 2 | Status: SHIPPED
Start: 2023-01-01 | End: 2023-12-29

## 2023-01-01 RX ORDER — AMOXICILLIN AND CLAVULANATE POTASSIUM 875; 125 MG/1; MG/1
1 TABLET, FILM COATED ORAL 2 TIMES DAILY
Qty: 14 TABLET | Refills: 0 | Status: SHIPPED | OUTPATIENT
Start: 2023-01-01 | End: 2023-01-01

## 2023-01-01 RX ORDER — HYDROMORPHONE HYDROCHLORIDE 1 MG/ML
0.5 INJECTION, SOLUTION INTRAMUSCULAR; INTRAVENOUS; SUBCUTANEOUS
Status: DISCONTINUED | OUTPATIENT
Start: 2023-01-01 | End: 2023-01-01 | Stop reason: HOSPADM

## 2023-01-01 RX ORDER — HYDROMORPHONE HYDROCHLORIDE 2 MG/ML
5 INJECTION, SOLUTION INTRAMUSCULAR; INTRAVENOUS; SUBCUTANEOUS
Status: DISCONTINUED | OUTPATIENT
Start: 2023-01-01 | End: 2023-12-29 | Stop reason: HOSPADM

## 2023-01-01 RX ORDER — ACETAMINOPHEN 650 MG/1
650 SUPPOSITORY RECTAL EVERY 4 HOURS PRN
Status: DISCONTINUED | OUTPATIENT
Start: 2023-01-01 | End: 2023-12-29 | Stop reason: HOSPADM

## 2023-01-01 RX ORDER — METHOCARBAMOL 750 MG/1
750 TABLET, FILM COATED ORAL 3 TIMES DAILY PRN
Qty: 28 TABLET | Refills: 0 | Status: SHIPPED | OUTPATIENT
Start: 2023-01-01 | End: 2023-01-01

## 2023-01-01 RX ORDER — PREGABALIN 150 MG/1
150 CAPSULE ORAL 3 TIMES DAILY
Status: CANCELLED | OUTPATIENT
Start: 2023-01-01

## 2023-01-01 RX ORDER — TRAMADOL HYDROCHLORIDE 50 MG/1
50 TABLET ORAL EVERY 4 HOURS PRN
Status: DISCONTINUED | OUTPATIENT
Start: 2023-01-01 | End: 2023-01-01 | Stop reason: HOSPADM

## 2023-01-01 RX ORDER — PREGABALIN 75 MG/1
75 CAPSULE ORAL 3 TIMES DAILY
Status: DISCONTINUED | OUTPATIENT
Start: 2023-01-01 | End: 2023-01-01

## 2023-01-01 RX ORDER — FUROSEMIDE 10 MG/ML
40 INJECTION INTRAMUSCULAR; INTRAVENOUS EVERY 8 HOURS
Status: DISCONTINUED | OUTPATIENT
Start: 2023-01-01 | End: 2023-01-01

## 2023-01-01 RX ORDER — ZOLPIDEM TARTRATE 5 MG/1
10 TABLET ORAL NIGHTLY PRN
Status: DISCONTINUED | OUTPATIENT
Start: 2023-01-01 | End: 2023-01-01 | Stop reason: HOSPADM

## 2023-01-01 RX ORDER — MORPHINE SULFATE 15 MG/1
15 TABLET ORAL EVERY 4 HOURS
Status: DISCONTINUED | OUTPATIENT
Start: 2023-01-01 | End: 2023-01-01 | Stop reason: HOSPADM

## 2023-01-01 RX ORDER — ALBUTEROL SULFATE 90 UG/1
2 AEROSOL, METERED RESPIRATORY (INHALATION)
Status: DISCONTINUED | OUTPATIENT
Start: 2023-01-01 | End: 2023-01-01 | Stop reason: HOSPADM

## 2023-01-01 RX ORDER — METHOCARBAMOL 750 MG/1
750 TABLET, FILM COATED ORAL 3 TIMES DAILY PRN
Qty: 120 TABLET | Status: ON HOLD
Start: 2023-01-01 | End: 2023-01-01 | Stop reason: SDUPTHER

## 2023-01-01 RX ORDER — ACETAMINOPHEN 325 MG/1
650 TABLET ORAL EVERY 6 HOURS
Status: CANCELLED | OUTPATIENT
Start: 2023-01-01

## 2023-01-01 RX ORDER — VERAPAMIL HYDROCHLORIDE 2.5 MG/ML
INJECTION, SOLUTION INTRAVENOUS
Status: COMPLETED
Start: 2023-01-01 | End: 2023-01-01

## 2023-01-01 RX ORDER — OXYCODONE HCL 5 MG/5 ML
5 SOLUTION, ORAL ORAL
Status: DISCONTINUED | OUTPATIENT
Start: 2023-01-01 | End: 2023-01-01 | Stop reason: HOSPADM

## 2023-01-01 RX ORDER — POLYETHYLENE GLYCOL 3350 17 G/17G
1 POWDER, FOR SOLUTION ORAL
Status: DISCONTINUED | OUTPATIENT
Start: 2023-01-01 | End: 2023-01-01

## 2023-01-01 RX ORDER — DAPAGLIFLOZIN 10 MG/1
10 TABLET, FILM COATED ORAL DAILY
Qty: 30 TABLET | Refills: 0 | Status: ON HOLD | OUTPATIENT
Start: 2023-01-01 | End: 2023-01-01 | Stop reason: SDUPTHER

## 2023-01-01 RX ORDER — PREGABALIN 150 MG/1
150 CAPSULE ORAL 2 TIMES DAILY
Status: DISCONTINUED | OUTPATIENT
Start: 2023-01-01 | End: 2023-01-01

## 2023-01-01 RX ORDER — OMEPRAZOLE 20 MG/1
20 CAPSULE, DELAYED RELEASE ORAL DAILY
Status: ON HOLD | COMMUNITY
End: 2023-01-01 | Stop reason: SDUPTHER

## 2023-01-01 RX ORDER — ACETAMINOPHEN 500 MG
1000 TABLET ORAL 3 TIMES DAILY
Status: DISCONTINUED | OUTPATIENT
Start: 2023-01-01 | End: 2023-01-01 | Stop reason: HOSPADM

## 2023-01-01 RX ORDER — MORPHINE SULFATE 15 MG/1
15 TABLET, FILM COATED, EXTENDED RELEASE ORAL 2 TIMES DAILY
Qty: 32 TABLET | Refills: 0 | Status: SHIPPED | OUTPATIENT
Start: 2023-01-01 | End: 2023-01-01

## 2023-01-01 RX ORDER — GABAPENTIN 300 MG/1
300 CAPSULE ORAL 3 TIMES DAILY
Qty: 90 CAPSULE | Refills: 2 | Status: SHIPPED | OUTPATIENT
Start: 2023-01-01 | End: 2023-01-01

## 2023-01-01 RX ORDER — MORPHINE SULFATE 15 MG/1
30 TABLET ORAL 4 TIMES DAILY
Status: CANCELLED | OUTPATIENT
Start: 2023-01-01

## 2023-01-01 RX ORDER — OMEPRAZOLE 40 MG/1
40 CAPSULE, DELAYED RELEASE ORAL DAILY
Qty: 30 CAPSULE | Refills: 2 | Status: SHIPPED | OUTPATIENT
Start: 2023-01-01 | End: 2023-01-01

## 2023-01-01 RX ORDER — IPRATROPIUM BROMIDE AND ALBUTEROL SULFATE 2.5; .5 MG/3ML; MG/3ML
3 SOLUTION RESPIRATORY (INHALATION) 3 TIMES DAILY
COMMUNITY

## 2023-01-01 RX ORDER — PREDNISONE 10 MG/1
10 TABLET ORAL 2 TIMES DAILY
Qty: 60 TABLET | Refills: 2 | Status: SHIPPED | OUTPATIENT
Start: 2023-01-01 | End: 2023-01-01 | Stop reason: SDUPTHER

## 2023-01-01 RX ORDER — 0.9 % SODIUM CHLORIDE 0.9 %
20 VIAL (ML) INJECTION EVERY 12 HOURS
COMMUNITY

## 2023-01-01 RX ORDER — VANCOMYCIN HYDROCHLORIDE 1 G/20ML
INJECTION, POWDER, LYOPHILIZED, FOR SOLUTION INTRAVENOUS
Status: COMPLETED | OUTPATIENT
Start: 2023-01-01 | End: 2023-01-01

## 2023-01-01 RX ORDER — TORSEMIDE 5 MG/1
10 TABLET ORAL
Status: DISCONTINUED | OUTPATIENT
Start: 2023-01-01 | End: 2023-01-01

## 2023-01-01 RX ORDER — POTASSIUM CHLORIDE 20 MEQ/1
10 TABLET, EXTENDED RELEASE ORAL DAILY
Status: DISCONTINUED | OUTPATIENT
Start: 2023-01-01 | End: 2023-01-01

## 2023-01-01 RX ORDER — CARBOXYMETHYLCELLULOSE SODIUM 5 MG/ML
1 SOLUTION/ DROPS OPHTHALMIC PRN
Status: DISCONTINUED | OUTPATIENT
Start: 2023-01-01 | End: 2023-12-29 | Stop reason: HOSPADM

## 2023-01-01 RX ORDER — DEXTROSE MONOHYDRATE 50 MG/ML
INJECTION, SOLUTION INTRAVENOUS CONTINUOUS
Status: DISCONTINUED | OUTPATIENT
Start: 2023-01-01 | End: 2023-01-01

## 2023-01-01 RX ORDER — NALOXEGOL OXALATE 25 MG/1
25 TABLET, FILM COATED ORAL
Status: ON HOLD | COMMUNITY
Start: 2023-01-01 | End: 2023-01-01

## 2023-01-01 RX ORDER — BUPIVACAINE HYDROCHLORIDE AND EPINEPHRINE 2.5; 5 MG/ML; UG/ML
INJECTION, SOLUTION EPIDURAL; INFILTRATION; INTRACAUDAL; PERINEURAL
Status: COMPLETED | OUTPATIENT
Start: 2023-01-01 | End: 2023-01-01

## 2023-01-01 RX ORDER — MIDAZOLAM HYDROCHLORIDE 1 MG/ML
1 INJECTION INTRAMUSCULAR; INTRAVENOUS
Status: DISCONTINUED | OUTPATIENT
Start: 2023-01-01 | End: 2023-01-01 | Stop reason: HOSPADM

## 2023-01-01 RX ORDER — SPIRONOLACTONE 25 MG/1
25 TABLET ORAL
Status: DISCONTINUED | OUTPATIENT
Start: 2023-01-01 | End: 2023-01-01 | Stop reason: HOSPADM

## 2023-01-01 RX ORDER — ACETAMINOPHEN 500 MG
1000 TABLET ORAL EVERY 6 HOURS PRN
Status: DISCONTINUED | OUTPATIENT
Start: 2023-01-01 | End: 2023-01-01

## 2023-01-01 RX ORDER — CHOLECALCIFEROL (VITAMIN D3) 125 MCG
2000 TABLET ORAL DAILY
COMMUNITY

## 2023-01-01 RX ORDER — SIMETHICONE 125 MG
125 TABLET,CHEWABLE ORAL 3 TIMES DAILY
Status: DISCONTINUED | OUTPATIENT
Start: 2023-01-01 | End: 2023-01-01 | Stop reason: HOSPADM

## 2023-01-01 RX ORDER — METHYLPREDNISOLONE 4 MG/1
24 TABLET ORAL
Status: COMPLETED | OUTPATIENT
Start: 2023-01-01 | End: 2023-01-01

## 2023-01-01 RX ORDER — SODIUM CHLORIDE, SODIUM LACTATE, POTASSIUM CHLORIDE, AND CALCIUM CHLORIDE .6; .31; .03; .02 G/100ML; G/100ML; G/100ML; G/100ML
500 INJECTION, SOLUTION INTRAVENOUS
Status: DISCONTINUED | OUTPATIENT
Start: 2023-01-01 | End: 2023-01-01 | Stop reason: HOSPADM

## 2023-01-01 RX ORDER — HYDROMORPHONE HYDROCHLORIDE 1 MG/ML
1 INJECTION, SOLUTION INTRAMUSCULAR; INTRAVENOUS; SUBCUTANEOUS
Status: DISCONTINUED | OUTPATIENT
Start: 2023-01-01 | End: 2023-01-01

## 2023-01-01 RX ORDER — MORPHINE SULFATE 30 MG/1
30 TABLET, FILM COATED, EXTENDED RELEASE ORAL EVERY 12 HOURS
Status: CANCELLED | OUTPATIENT
Start: 2023-01-01

## 2023-01-01 RX ORDER — HYDROMORPHONE HYDROCHLORIDE 2 MG/ML
3 INJECTION, SOLUTION INTRAMUSCULAR; INTRAVENOUS; SUBCUTANEOUS
Status: DISCONTINUED | OUTPATIENT
Start: 2023-01-01 | End: 2023-12-29 | Stop reason: HOSPADM

## 2023-01-01 RX ORDER — AMIODARONE HYDROCHLORIDE 200 MG/1
400 TABLET ORAL TWICE DAILY
Status: COMPLETED | OUTPATIENT
Start: 2023-01-01 | End: 2023-01-01

## 2023-01-01 RX ORDER — ACETAMINOPHEN 500 MG
1000 TABLET ORAL ONCE
Status: COMPLETED | OUTPATIENT
Start: 2023-01-01 | End: 2023-01-01

## 2023-01-01 RX ORDER — ZOLPIDEM TARTRATE 5 MG/1
5 TABLET ORAL NIGHTLY PRN
Qty: 30 TABLET | Refills: 0 | Status: ON HOLD | OUTPATIENT
Start: 2023-01-01 | End: 2023-01-01

## 2023-01-01 RX ORDER — LACTULOSE 10 G/15ML
20 SOLUTION ORAL 3 TIMES DAILY
COMMUNITY

## 2023-01-01 RX ORDER — FERROUS SULFATE 325(65) MG
325 TABLET ORAL 2 TIMES DAILY WITH MEALS
Status: DISCONTINUED | OUTPATIENT
Start: 2023-01-01 | End: 2023-01-01

## 2023-01-01 RX ORDER — BUPIVACAINE HYDROCHLORIDE 2.5 MG/ML
INJECTION, SOLUTION EPIDURAL; INFILTRATION; INTRACAUDAL
Status: DISCONTINUED | OUTPATIENT
Start: 2023-01-01 | End: 2023-01-01 | Stop reason: SURG

## 2023-01-01 RX ORDER — LANOLIN ALCOHOL/MO/W.PET/CERES
3 CREAM (GRAM) TOPICAL
Qty: 30 TABLET | Refills: 2 | Status: ON HOLD | OUTPATIENT
Start: 2023-01-01 | End: 2023-01-01

## 2023-01-01 RX ORDER — OXYCODONE HCL 5 MG/5 ML
10 SOLUTION, ORAL ORAL ONCE
Status: COMPLETED | OUTPATIENT
Start: 2023-01-01 | End: 2023-01-01

## 2023-01-01 RX ORDER — ACETAMINOPHEN 325 MG/1
975 TABLET ORAL ONCE
Status: COMPLETED | OUTPATIENT
Start: 2023-01-01 | End: 2023-01-01

## 2023-01-01 RX ORDER — PREDNISONE 20 MG/1
40 TABLET ORAL DAILY
Qty: 4 TABLET | Refills: 0 | Status: SHIPPED | OUTPATIENT
Start: 2023-01-01 | End: 2023-01-01

## 2023-01-01 RX ORDER — SPIRONOLACTONE 25 MG/1
25 TABLET ORAL
Status: DISCONTINUED | OUTPATIENT
Start: 2023-01-01 | End: 2023-01-01

## 2023-01-01 RX ORDER — ACETAMINOPHEN 500 MG
1000 TABLET ORAL EVERY 6 HOURS PRN
Status: DISCONTINUED | OUTPATIENT
Start: 2023-01-01 | End: 2023-01-01 | Stop reason: HOSPADM

## 2023-01-01 RX ORDER — MORPHINE SULFATE 15 MG/1
15 TABLET ORAL EVERY 4 HOURS PRN
Status: DISCONTINUED | OUTPATIENT
Start: 2023-01-01 | End: 2023-01-01

## 2023-01-01 RX ORDER — ATROPINE SULFATE 10 MG/ML
2 SOLUTION/ DROPS OPHTHALMIC EVERY 4 HOURS PRN
Status: DISCONTINUED | OUTPATIENT
Start: 2023-01-01 | End: 2023-12-29 | Stop reason: HOSPADM

## 2023-01-01 RX ORDER — MORPHINE SULFATE 30 MG/1
30 TABLET, FILM COATED, EXTENDED RELEASE ORAL EVERY 12 HOURS
Qty: 28 TABLET | Refills: 0 | Status: SHIPPED | OUTPATIENT
Start: 2023-01-01 | End: 2023-01-01

## 2023-01-01 RX ORDER — SODIUM CHLORIDE, SODIUM LACTATE, POTASSIUM CHLORIDE, AND CALCIUM CHLORIDE .6; .31; .03; .02 G/100ML; G/100ML; G/100ML; G/100ML
500 INJECTION, SOLUTION INTRAVENOUS
Status: DISCONTINUED | OUTPATIENT
Start: 2023-01-01 | End: 2023-01-01

## 2023-01-01 RX ORDER — GABAPENTIN 300 MG/1
600 CAPSULE ORAL 3 TIMES DAILY
Status: DISCONTINUED | OUTPATIENT
Start: 2023-01-01 | End: 2023-01-01

## 2023-01-01 RX ORDER — DAPAGLIFLOZIN 10 MG/1
10 TABLET, FILM COATED ORAL DAILY
Qty: 30 TABLET | Refills: 2 | Status: ON HOLD | OUTPATIENT
Start: 2023-01-01 | End: 2023-01-01 | Stop reason: SDUPTHER

## 2023-01-01 RX ORDER — AMOXICILLIN 250 MG
2 CAPSULE ORAL 2 TIMES DAILY
Status: DISCONTINUED | OUTPATIENT
Start: 2023-01-01 | End: 2023-01-01

## 2023-01-01 RX ORDER — AMOXICILLIN 250 MG
2 CAPSULE ORAL 2 TIMES DAILY
Qty: 30 TABLET | Refills: 0 | Status: ON HOLD | OUTPATIENT
Start: 2023-01-01 | End: 2023-01-01

## 2023-01-01 RX ORDER — DEXTROSE MONOHYDRATE 25 G/50ML
25 INJECTION, SOLUTION INTRAVENOUS ONCE
Status: DISCONTINUED | OUTPATIENT
Start: 2023-01-01 | End: 2023-01-01

## 2023-01-01 RX ORDER — MORPHINE SULFATE 30 MG/1
30 TABLET ORAL EVERY 6 HOURS PRN
Qty: 56 TABLET | Refills: 0 | Status: SHIPPED | OUTPATIENT
Start: 2023-01-01 | End: 2023-01-01

## 2023-01-01 RX ORDER — KETOCONAZOLE 20 MG/G
CREAM TOPICAL 2 TIMES DAILY
Status: DISPENSED | OUTPATIENT
Start: 2023-01-01 | End: 2023-01-01

## 2023-01-01 RX ORDER — METHYLPREDNISOLONE 4 MG/1
4 TABLET ORAL 2 TIMES DAILY WITH MEALS
Status: DISCONTINUED | OUTPATIENT
Start: 2023-01-01 | End: 2023-01-01 | Stop reason: HOSPADM

## 2023-01-01 RX ORDER — MORPHINE SULFATE 15 MG/1
30 TABLET ORAL EVERY 4 HOURS PRN
Status: CANCELLED | OUTPATIENT
Start: 2023-01-01

## 2023-01-01 RX ORDER — OMEPRAZOLE 20 MG/1
20 CAPSULE, DELAYED RELEASE ORAL 2 TIMES DAILY
Status: DISCONTINUED | OUTPATIENT
Start: 2023-01-01 | End: 2023-01-01 | Stop reason: HOSPADM

## 2023-01-01 RX ORDER — FUROSEMIDE 20 MG/1
20 TABLET ORAL 2 TIMES DAILY PRN
Qty: 60 TABLET | Refills: 2 | Status: ON HOLD
Start: 2023-01-01 | End: 2023-01-01

## 2023-01-01 RX ORDER — POTASSIUM CHLORIDE 20 MEQ/1
60 TABLET, EXTENDED RELEASE ORAL ONCE
Status: DISCONTINUED | OUTPATIENT
Start: 2023-01-01 | End: 2023-01-01

## 2023-01-01 RX ORDER — SODIUM CHLORIDE, SODIUM LACTATE, POTASSIUM CHLORIDE, AND CALCIUM CHLORIDE .6; .31; .03; .02 G/100ML; G/100ML; G/100ML; G/100ML
1000 INJECTION, SOLUTION INTRAVENOUS ONCE
Status: DISCONTINUED | OUTPATIENT
Start: 2023-01-01 | End: 2023-01-01

## 2023-01-01 RX ORDER — POTASSIUM CHLORIDE 20 MEQ/1
20 TABLET, EXTENDED RELEASE ORAL ONCE
Status: COMPLETED | OUTPATIENT
Start: 2023-01-01 | End: 2023-01-01

## 2023-01-01 RX ORDER — FUROSEMIDE 20 MG/1
20 TABLET ORAL
Status: DISCONTINUED | OUTPATIENT
Start: 2023-01-01 | End: 2023-01-01 | Stop reason: HOSPADM

## 2023-01-01 RX ORDER — IPRATROPIUM BROMIDE AND ALBUTEROL SULFATE 2.5; .5 MG/3ML; MG/3ML
3 SOLUTION RESPIRATORY (INHALATION) 3 TIMES DAILY
Status: DISCONTINUED | OUTPATIENT
Start: 2023-01-01 | End: 2023-01-01 | Stop reason: HOSPADM

## 2023-01-01 RX ORDER — MORPHINE SULFATE 15 MG/1
30 TABLET ORAL EVERY 6 HOURS PRN
Status: CANCELLED | OUTPATIENT
Start: 2023-01-01

## 2023-01-01 RX ORDER — CEFTRIAXONE 2 G/1
2000 INJECTION, POWDER, FOR SOLUTION INTRAMUSCULAR; INTRAVENOUS ONCE
Status: COMPLETED | OUTPATIENT
Start: 2023-01-01 | End: 2023-01-01

## 2023-01-01 RX ORDER — ACETAMINOPHEN 325 MG/1
650 TABLET ORAL EVERY 4 HOURS PRN
Status: DISCONTINUED | OUTPATIENT
Start: 2023-01-01 | End: 2023-01-01

## 2023-01-01 RX ORDER — DEXAMETHASONE SODIUM PHOSPHATE 10 MG/ML
INJECTION, SOLUTION INTRAMUSCULAR; INTRAVENOUS
Status: DISCONTINUED | OUTPATIENT
Start: 2023-01-01 | End: 2023-01-01 | Stop reason: SURG

## 2023-01-01 RX ORDER — LANSOPRAZOLE 30 MG/1
30 TABLET, ORALLY DISINTEGRATING, DELAYED RELEASE ORAL DAILY
Status: DISCONTINUED | OUTPATIENT
Start: 2023-01-01 | End: 2023-01-01

## 2023-01-01 RX ORDER — ROCURONIUM BROMIDE 10 MG/ML
INJECTION, SOLUTION INTRAVENOUS PRN
Status: DISCONTINUED | OUTPATIENT
Start: 2023-01-01 | End: 2023-01-01 | Stop reason: SURG

## 2023-01-01 RX ORDER — NALOXEGOL OXALATE 25 MG/1
TABLET, FILM COATED ORAL
Status: ON HOLD | COMMUNITY
Start: 2023-01-01 | End: 2023-01-01

## 2023-01-01 RX ORDER — CARBOXYMETHYLCELLULOSE SODIUM 5 MG/ML
1 SOLUTION/ DROPS OPHTHALMIC PRN
Status: CANCELLED | OUTPATIENT
Start: 2023-01-01

## 2023-01-01 RX ORDER — VITAMIN B COMPLEX
2000 TABLET ORAL DAILY
Status: DISCONTINUED | OUTPATIENT
Start: 2023-01-01 | End: 2023-01-01 | Stop reason: HOSPADM

## 2023-01-01 RX ORDER — PREGABALIN 200 MG/1
200 CAPSULE ORAL DAILY
Status: ON HOLD | COMMUNITY
Start: 2023-01-01 | End: 2023-01-01

## 2023-01-01 RX ORDER — DEXTROSE AND SODIUM CHLORIDE 5; .45 G/100ML; G/100ML
INJECTION, SOLUTION INTRAVENOUS CONTINUOUS
Status: DISCONTINUED | OUTPATIENT
Start: 2023-01-01 | End: 2023-01-01

## 2023-01-01 RX ORDER — SIMETHICONE 125 MG
125 TABLET,CHEWABLE ORAL 3 TIMES DAILY PRN
Qty: 120 TABLET | Status: ON HOLD
Start: 2023-01-01 | End: 2023-01-01

## 2023-01-01 RX ORDER — ERGOCALCIFEROL 1.25 MG/1
50000 CAPSULE ORAL
Status: CANCELLED | OUTPATIENT
Start: 2023-01-01

## 2023-01-01 RX ORDER — ACETAMINOPHEN 500 MG
500 TABLET ORAL EVERY 6 HOURS PRN
Status: DISCONTINUED | OUTPATIENT
Start: 2023-01-01 | End: 2023-01-01

## 2023-01-01 RX ORDER — PREGABALIN 150 MG/1
150 CAPSULE ORAL 3 TIMES DAILY
Status: ON HOLD | COMMUNITY
End: 2023-01-01 | Stop reason: SDUPTHER

## 2023-01-01 RX ORDER — HYDROMORPHONE HYDROCHLORIDE 1 MG/ML
1 INJECTION, SOLUTION INTRAMUSCULAR; INTRAVENOUS; SUBCUTANEOUS ONCE
Status: COMPLETED | OUTPATIENT
Start: 2023-01-01 | End: 2023-01-01

## 2023-01-01 RX ORDER — LORAZEPAM 2 MG/ML
1 CONCENTRATE ORAL
Status: DISCONTINUED | OUTPATIENT
Start: 2023-01-01 | End: 2023-12-29 | Stop reason: HOSPADM

## 2023-01-01 RX ORDER — PREGABALIN 100 MG/1
200 CAPSULE ORAL
Status: DISCONTINUED | OUTPATIENT
Start: 2023-01-01 | End: 2023-01-01

## 2023-01-01 RX ORDER — DRONABINOL 2.5 MG/1
2.5 CAPSULE ORAL 2 TIMES DAILY PRN
Status: DISCONTINUED | OUTPATIENT
Start: 2023-01-01 | End: 2023-01-01

## 2023-01-01 RX ORDER — ENOXAPARIN SODIUM 100 MG/ML
40 INJECTION SUBCUTANEOUS DAILY
Status: CANCELLED | OUTPATIENT
Start: 2023-01-01

## 2023-01-01 RX ORDER — SODIUM CHLORIDE 9 MG/ML
INJECTION, SOLUTION INTRAVENOUS CONTINUOUS
Status: ACTIVE | OUTPATIENT
Start: 2023-01-01 | End: 2023-01-01

## 2023-01-01 RX ORDER — ZOLPIDEM TARTRATE 5 MG/1
5 TABLET ORAL NIGHTLY PRN
Qty: 16 TABLET | Refills: 0 | Status: SHIPPED | OUTPATIENT
Start: 2023-01-01 | End: 2023-01-01

## 2023-01-01 RX ORDER — ZOLPIDEM TARTRATE 5 MG/1
5 TABLET ORAL NIGHTLY PRN
Qty: 15 TABLET | Refills: 0 | Status: SHIPPED | OUTPATIENT
Start: 2023-01-01 | End: 2023-01-01

## 2023-01-01 RX ORDER — PREGABALIN 100 MG/1
200 CAPSULE ORAL 3 TIMES DAILY
Status: DISCONTINUED | OUTPATIENT
Start: 2023-01-01 | End: 2023-01-01 | Stop reason: HOSPADM

## 2023-01-01 RX ORDER — SPIRONOLACTONE 25 MG/1
25 TABLET ORAL
Status: CANCELLED | OUTPATIENT
Start: 2023-01-01

## 2023-01-01 RX ORDER — TRANEXAMIC ACID 100 MG/ML
INJECTION, SOLUTION INTRAVENOUS PRN
Status: DISCONTINUED | OUTPATIENT
Start: 2023-01-01 | End: 2023-01-01 | Stop reason: SURG

## 2023-01-01 RX ORDER — MORPHINE SULFATE 15 MG/1
30 TABLET ORAL EVERY 6 HOURS PRN
Status: DISCONTINUED | OUTPATIENT
Start: 2023-01-01 | End: 2023-01-01 | Stop reason: HOSPADM

## 2023-01-01 RX ORDER — TORSEMIDE 20 MG/1
40 TABLET ORAL DAILY
Qty: 60 TABLET | Refills: 0 | Status: ON HOLD
Start: 2023-01-01 | End: 2023-01-01

## 2023-01-01 RX ORDER — METOCLOPRAMIDE HYDROCHLORIDE 5 MG/ML
INJECTION INTRAMUSCULAR; INTRAVENOUS PRN
Status: DISCONTINUED | OUTPATIENT
Start: 2023-01-01 | End: 2023-01-01 | Stop reason: SURG

## 2023-01-01 RX ORDER — ONDANSETRON 4 MG/1
4 TABLET, ORALLY DISINTEGRATING ORAL 4 TIMES DAILY PRN
Status: DISCONTINUED | OUTPATIENT
Start: 2023-01-01 | End: 2023-01-01

## 2023-01-01 RX ORDER — PHENYLEPHRINE HCL IN 0.9% NACL 0.5 MG/5ML
SYRINGE (ML) INTRAVENOUS
Status: COMPLETED
Start: 2023-01-01 | End: 2023-01-01

## 2023-01-01 RX ORDER — METOPROLOL TARTRATE 1 MG/ML
INJECTION, SOLUTION INTRAVENOUS PRN
Status: DISCONTINUED | OUTPATIENT
Start: 2023-01-01 | End: 2023-01-01 | Stop reason: SURG

## 2023-01-01 RX ORDER — BENZOCAINE/MENTHOL 6 MG-10 MG
LOZENGE MUCOUS MEMBRANE 3 TIMES DAILY
Status: DISCONTINUED | OUTPATIENT
Start: 2023-01-01 | End: 2023-01-01 | Stop reason: HOSPADM

## 2023-01-01 RX ORDER — MORPHINE SULFATE 15 MG/1
30 TABLET ORAL EVERY 4 HOURS PRN
Status: DISCONTINUED | OUTPATIENT
Start: 2023-01-01 | End: 2023-01-01

## 2023-01-01 RX ORDER — FLUCONAZOLE 100 MG/1
100 TABLET ORAL DAILY
Qty: 5 TABLET | Refills: 0 | Status: ON HOLD
Start: 2023-01-01 | End: 2023-01-01

## 2023-01-01 RX ORDER — POTASSIUM CHLORIDE 20 MEQ/1
40 TABLET, EXTENDED RELEASE ORAL EVERY 8 HOURS
Status: DISCONTINUED | OUTPATIENT
Start: 2023-01-01 | End: 2023-01-01

## 2023-01-01 RX ORDER — OXYCODONE HCL 10 MG/1
10 TABLET, FILM COATED, EXTENDED RELEASE ORAL EVERY 12 HOURS
Status: DISCONTINUED | OUTPATIENT
Start: 2023-01-01 | End: 2023-01-01

## 2023-01-01 RX ORDER — HYDROMORPHONE HYDROCHLORIDE 2 MG/ML
2 INJECTION, SOLUTION INTRAMUSCULAR; INTRAVENOUS; SUBCUTANEOUS
Status: DISCONTINUED | OUTPATIENT
Start: 2023-01-01 | End: 2023-01-01

## 2023-01-01 RX ORDER — LIDOCAINE HYDROCHLORIDE 40 MG/ML
SOLUTION TOPICAL PRN
Status: DISCONTINUED | OUTPATIENT
Start: 2023-01-01 | End: 2023-01-01 | Stop reason: SURG

## 2023-01-01 RX ORDER — MORPHINE SULFATE 15 MG/1
30 TABLET ORAL EVERY 6 HOURS
Status: CANCELLED | OUTPATIENT
Start: 2023-01-01

## 2023-01-01 RX ORDER — AZITHROMYCIN 250 MG/1
500 TABLET, FILM COATED ORAL ONCE
Status: COMPLETED | OUTPATIENT
Start: 2023-01-01 | End: 2023-01-01

## 2023-01-01 RX ORDER — DRONABINOL 2.5 MG/1
2.5 CAPSULE ORAL 2 TIMES DAILY PRN
Qty: 20 CAPSULE | Refills: 0 | Status: ON HOLD
Start: 2023-01-01 | End: 2023-01-01

## 2023-01-01 RX ORDER — SODIUM BICARBONATE IN D5W 150/1000ML
PLASTIC BAG, INJECTION (ML) INTRAVENOUS CONTINUOUS
Status: DISCONTINUED | OUTPATIENT
Start: 2023-01-01 | End: 2023-01-01

## 2023-01-01 RX ORDER — CAPSAICIN 0.025 %
CREAM (GRAM) TOPICAL 3 TIMES DAILY PRN
Status: DISCONTINUED | OUTPATIENT
Start: 2023-01-01 | End: 2023-01-01 | Stop reason: HOSPADM

## 2023-01-01 RX ORDER — FUROSEMIDE 10 MG/ML
20 INJECTION INTRAMUSCULAR; INTRAVENOUS ONCE
Status: COMPLETED | OUTPATIENT
Start: 2023-01-01 | End: 2023-01-01

## 2023-01-01 RX ORDER — DRONABINOL 2.5 MG/1
2.5 CAPSULE ORAL
Status: COMPLETED | OUTPATIENT
Start: 2023-01-01 | End: 2023-01-01

## 2023-01-01 RX ORDER — LANOLIN ALCOHOL/MO/W.PET/CERES
3 CREAM (GRAM) TOPICAL
Status: DISCONTINUED | OUTPATIENT
Start: 2023-01-01 | End: 2023-01-01 | Stop reason: HOSPADM

## 2023-01-01 RX ORDER — DRONABINOL 2.5 MG/1
2.5 CAPSULE ORAL 2 TIMES DAILY PRN
Status: DISCONTINUED | OUTPATIENT
Start: 2023-01-01 | End: 2023-01-01 | Stop reason: HOSPADM

## 2023-01-01 RX ORDER — LINEZOLID 2 MG/ML
600 INJECTION, SOLUTION INTRAVENOUS EVERY 12 HOURS
Status: DISCONTINUED | OUTPATIENT
Start: 2023-01-01 | End: 2023-01-01

## 2023-01-01 RX ORDER — ROPIVACAINE HYDROCHLORIDE 5 MG/ML
INJECTION, SOLUTION EPIDURAL; INFILTRATION; PERINEURAL
Status: DISCONTINUED | OUTPATIENT
Start: 2023-01-01 | End: 2023-01-01 | Stop reason: SURG

## 2023-01-01 RX ORDER — OXYCODONE HYDROCHLORIDE 10 MG/1
10 TABLET ORAL EVERY 6 HOURS PRN
Qty: 56 TABLET | Refills: 0 | Status: SHIPPED | OUTPATIENT
Start: 2023-01-01 | End: 2023-01-01

## 2023-01-01 RX ORDER — OXYCODONE HCL 20 MG/1
20 TABLET, FILM COATED, EXTENDED RELEASE ORAL ONCE
Status: COMPLETED | OUTPATIENT
Start: 2023-01-01 | End: 2023-01-01

## 2023-01-01 RX ORDER — LINEZOLID 600 MG/1
600 TABLET, FILM COATED ORAL 2 TIMES DAILY
COMMUNITY
Start: 2023-01-01 | End: 2023-12-31

## 2023-01-01 RX ORDER — LORAZEPAM 2 MG/ML
1 INJECTION INTRAMUSCULAR
Status: DISCONTINUED | OUTPATIENT
Start: 2023-01-01 | End: 2023-12-29 | Stop reason: HOSPADM

## 2023-01-01 RX ORDER — PREGABALIN 150 MG/1
150 CAPSULE ORAL DAILY
Status: DISCONTINUED | OUTPATIENT
Start: 2023-01-01 | End: 2023-01-01

## 2023-01-01 RX ORDER — OMEPRAZOLE 20 MG/1
20 CAPSULE, DELAYED RELEASE ORAL 2 TIMES DAILY
Status: CANCELLED | OUTPATIENT
Start: 2023-01-01

## 2023-01-01 RX ORDER — FERROUS SULFATE 325(65) MG
325 TABLET ORAL
Status: DISCONTINUED | OUTPATIENT
Start: 2023-01-01 | End: 2023-01-01 | Stop reason: HOSPADM

## 2023-01-01 RX ORDER — OXYCODONE HYDROCHLORIDE 10 MG/1
10 TABLET ORAL
Status: DISCONTINUED | OUTPATIENT
Start: 2023-01-01 | End: 2023-01-01 | Stop reason: HOSPADM

## 2023-01-01 RX ORDER — SPIRONOLACTONE 25 MG/1
25 TABLET ORAL DAILY
Qty: 30 TABLET | Refills: 3 | Status: SHIPPED
Start: 2023-01-01 | End: 2023-12-29

## 2023-01-01 RX ORDER — PREDNISONE 20 MG/1
40 TABLET ORAL DAILY
Status: COMPLETED | OUTPATIENT
Start: 2023-01-01 | End: 2023-01-01

## 2023-01-01 RX ORDER — PREGABALIN 150 MG/1
150 CAPSULE ORAL 3 TIMES DAILY
Qty: 3 CAPSULE | Status: ON HOLD
Start: 2023-01-01 | End: 2023-01-01

## 2023-01-01 RX ORDER — ACETAMINOPHEN 500 MG
1000 TABLET ORAL EVERY 6 HOURS
Status: DISCONTINUED | OUTPATIENT
Start: 2023-01-01 | End: 2023-01-01

## 2023-01-01 RX ORDER — HEPARIN SODIUM 5000 [USP'U]/ML
5000 INJECTION, SOLUTION INTRAVENOUS; SUBCUTANEOUS EVERY 8 HOURS
Status: DISCONTINUED | OUTPATIENT
Start: 2023-01-01 | End: 2023-01-01 | Stop reason: HOSPADM

## 2023-01-01 RX ORDER — OMEPRAZOLE 20 MG/1
40 CAPSULE, DELAYED RELEASE ORAL DAILY
Status: CANCELLED | OUTPATIENT
Start: 2023-01-01

## 2023-01-01 RX ADMIN — MORPHINE SULFATE 30 MG: 15 TABLET ORAL at 02:50

## 2023-01-01 RX ADMIN — HYDROMORPHONE HYDROCHLORIDE 0.5 MG: 1 INJECTION, SOLUTION INTRAMUSCULAR; INTRAVENOUS; SUBCUTANEOUS at 04:13

## 2023-01-01 RX ADMIN — FENTANYL CITRATE 100 MCG: 50 INJECTION, SOLUTION INTRAMUSCULAR; INTRAVENOUS at 17:07

## 2023-01-01 RX ADMIN — MORPHINE SULFATE 30 MG: 15 TABLET ORAL at 20:42

## 2023-01-01 RX ADMIN — Medication 1000 UNITS: at 08:30

## 2023-01-01 RX ADMIN — ACETAMINOPHEN 650 MG: 325 TABLET ORAL at 12:30

## 2023-01-01 RX ADMIN — DOCUSATE SODIUM 50 MG AND SENNOSIDES 8.6 MG 2 TABLET: 8.6; 5 TABLET, FILM COATED ORAL at 16:40

## 2023-01-01 RX ADMIN — ACETAMINOPHEN 650 MG: 325 TABLET ORAL at 17:42

## 2023-01-01 RX ADMIN — ACETAMINOPHEN 650 MG: 325 TABLET ORAL at 00:28

## 2023-01-01 RX ADMIN — HYDROMORPHONE HYDROCHLORIDE 1 MG: 1 INJECTION, SOLUTION INTRAMUSCULAR; INTRAVENOUS; SUBCUTANEOUS at 11:58

## 2023-01-01 RX ADMIN — CEFAZOLIN 2 G: 2 INJECTION, POWDER, FOR SOLUTION INTRAMUSCULAR; INTRAVENOUS at 23:45

## 2023-01-01 RX ADMIN — CEFAZOLIN 2 G: 2 INJECTION, POWDER, FOR SOLUTION INTRAMUSCULAR; INTRAVENOUS at 05:23

## 2023-01-01 RX ADMIN — DULOXETINE HYDROCHLORIDE 60 MG: 60 CAPSULE, DELAYED RELEASE ORAL at 06:32

## 2023-01-01 RX ADMIN — CEFDINIR 300 MG: 300 CAPSULE ORAL at 08:45

## 2023-01-01 RX ADMIN — SENNOSIDES AND DOCUSATE SODIUM 2 TABLET: 50; 8.6 TABLET ORAL at 20:44

## 2023-01-01 RX ADMIN — Medication 2000 UNITS: at 08:43

## 2023-01-01 RX ADMIN — PIPERACILLIN AND TAZOBACTAM 4.5 G: 4; .5 INJECTION, POWDER, FOR SOLUTION INTRAVENOUS at 00:19

## 2023-01-01 RX ADMIN — NOREPINEPHRINE BITARTRATE 0.05 MCG/KG/MIN: 1 INJECTION INTRAVENOUS at 03:00

## 2023-01-01 RX ADMIN — NICOTINE 7 MG: 7 PATCH TRANSDERMAL at 06:44

## 2023-01-01 RX ADMIN — ENOXAPARIN SODIUM 40 MG: 100 INJECTION SUBCUTANEOUS at 17:55

## 2023-01-01 RX ADMIN — CEFAZOLIN 2 G: 2 INJECTION, POWDER, FOR SOLUTION INTRAMUSCULAR; INTRAVENOUS at 05:43

## 2023-01-01 RX ADMIN — THIAMINE HCL TAB 100 MG 100 MG: 100 TAB at 08:32

## 2023-01-01 RX ADMIN — SODIUM HYPOCHLORITE 2 ML: 1.25 SOLUTION TOPICAL at 17:17

## 2023-01-01 RX ADMIN — PREGABALIN 150 MG: 150 CAPSULE ORAL at 08:40

## 2023-01-01 RX ADMIN — LIDOCAINE HYDROCHLORIDE 100 MG: 20 INJECTION, SOLUTION EPIDURAL; INFILTRATION; INTRACAUDAL at 16:21

## 2023-01-01 RX ADMIN — MORPHINE SULFATE 15 MG: 15 TABLET ORAL at 19:36

## 2023-01-01 RX ADMIN — MORPHINE SULFATE 30 MG: 15 TABLET ORAL at 21:23

## 2023-01-01 RX ADMIN — ACETAMINOPHEN 650 MG: 325 TABLET, FILM COATED ORAL at 13:37

## 2023-01-01 RX ADMIN — PREGABALIN 150 MG: 150 CAPSULE ORAL at 08:22

## 2023-01-01 RX ADMIN — SIMETHICONE 125 MG: 125 TABLET, CHEWABLE ORAL at 18:45

## 2023-01-01 RX ADMIN — PREGABALIN 200 MG: 100 CAPSULE ORAL at 14:28

## 2023-01-01 RX ADMIN — SENNOSIDES AND DOCUSATE SODIUM 2 TABLET: 50; 8.6 TABLET ORAL at 20:26

## 2023-01-01 RX ADMIN — DAPAGLIFLOZIN 10 MG: 10 TABLET, FILM COATED ORAL at 08:30

## 2023-01-01 RX ADMIN — HYDROMORPHONE HYDROCHLORIDE 1 MG: 1 INJECTION, SOLUTION INTRAMUSCULAR; INTRAVENOUS; SUBCUTANEOUS at 20:01

## 2023-01-01 RX ADMIN — APIXABAN 5 MG: 5 TABLET, FILM COATED ORAL at 20:44

## 2023-01-01 RX ADMIN — SODIUM CHLORIDE, PRESERVATIVE FREE 20 ML: 5 INJECTION INTRAVENOUS at 21:00

## 2023-01-01 RX ADMIN — HYDROMORPHONE HYDROCHLORIDE 1 MG: 1 INJECTION, SOLUTION INTRAMUSCULAR; INTRAVENOUS; SUBCUTANEOUS at 04:17

## 2023-01-01 RX ADMIN — APIXABAN 5 MG: 5 TABLET, FILM COATED ORAL at 08:41

## 2023-01-01 RX ADMIN — THIAMINE HCL TAB 100 MG 100 MG: 100 TAB at 05:59

## 2023-01-01 RX ADMIN — ZOLPIDEM TARTRATE 5 MG: 5 TABLET ORAL at 01:43

## 2023-01-01 RX ADMIN — Medication 950 MG: at 08:33

## 2023-01-01 RX ADMIN — MORPHINE SULFATE 30 MG: 15 TABLET ORAL at 23:19

## 2023-01-01 RX ADMIN — CEFAZOLIN 2 G: 2 INJECTION, POWDER, FOR SOLUTION INTRAMUSCULAR; INTRAVENOUS at 23:08

## 2023-01-01 RX ADMIN — SIMETHICONE 125 MG: 125 TABLET, CHEWABLE ORAL at 14:28

## 2023-01-01 RX ADMIN — MORPHINE SULFATE 15 MG: 15 TABLET, FILM COATED, EXTENDED RELEASE ORAL at 21:40

## 2023-01-01 RX ADMIN — LIDOCAINE 2 PATCH: 700 PATCH TOPICAL at 15:06

## 2023-01-01 RX ADMIN — SODIUM CHLORIDE, PRESERVATIVE FREE 20 ML: 5 INJECTION INTRAVENOUS at 22:24

## 2023-01-01 RX ADMIN — DEXAMETHASONE SODIUM PHOSPHATE 8 MG: 4 INJECTION INTRA-ARTICULAR; INTRALESIONAL; INTRAMUSCULAR; INTRAVENOUS; SOFT TISSUE at 15:33

## 2023-01-01 RX ADMIN — MAGNESIUM HYDROXIDE 30 ML: 1200 LIQUID ORAL at 18:57

## 2023-01-01 RX ADMIN — MORPHINE SULFATE 30 MG: 15 TABLET ORAL at 14:12

## 2023-01-01 RX ADMIN — Medication 2000 UNITS: at 08:53

## 2023-01-01 RX ADMIN — Medication 200 MCG: at 15:37

## 2023-01-01 RX ADMIN — AMOXICILLIN AND CLAVULANATE POTASSIUM 1 TABLET: 875; 125 TABLET, FILM COATED ORAL at 04:45

## 2023-01-01 RX ADMIN — LINEZOLID 600 MG: 600 INJECTION, SOLUTION INTRAVENOUS at 20:22

## 2023-01-01 RX ADMIN — OMEPRAZOLE 20 MG: 20 CAPSULE, DELAYED RELEASE ORAL at 08:25

## 2023-01-01 RX ADMIN — MORPHINE SULFATE 30 MG: 30 TABLET, FILM COATED, EXTENDED RELEASE ORAL at 21:22

## 2023-01-01 RX ADMIN — SENNOSIDES AND DOCUSATE SODIUM 2 TABLET: 50; 8.6 TABLET ORAL at 21:21

## 2023-01-01 RX ADMIN — SENNOSIDES AND DOCUSATE SODIUM 2 TABLET: 50; 8.6 TABLET ORAL at 20:41

## 2023-01-01 RX ADMIN — PREGABALIN 75 MG: 75 CAPSULE ORAL at 11:09

## 2023-01-01 RX ADMIN — MORPHINE SULFATE 30 MG: 15 TABLET ORAL at 17:21

## 2023-01-01 RX ADMIN — HEPARIN SODIUM 5000 UNITS: 5000 INJECTION, SOLUTION INTRAVENOUS; SUBCUTANEOUS at 13:12

## 2023-01-01 RX ADMIN — MORPHINE SULFATE 30 MG: 15 TABLET ORAL at 08:05

## 2023-01-01 RX ADMIN — MORPHINE SULFATE 15 MG: 15 TABLET, FILM COATED, EXTENDED RELEASE ORAL at 17:09

## 2023-01-01 RX ADMIN — Medication 950 MG: at 08:41

## 2023-01-01 RX ADMIN — NICOTINE 7 MG: 7 PATCH, EXTENDED RELEASE TRANSDERMAL at 05:54

## 2023-01-01 RX ADMIN — MORPHINE SULFATE 30 MG: 15 TABLET ORAL at 00:00

## 2023-01-01 RX ADMIN — SODIUM CHLORIDE 250 MG: 9 INJECTION, SOLUTION INTRAVENOUS at 09:03

## 2023-01-01 RX ADMIN — MORPHINE SULFATE 30 MG: 15 TABLET ORAL at 20:25

## 2023-01-01 RX ADMIN — DOCUSATE SODIUM 10 MG: 50 LIQUID ORAL at 21:25

## 2023-01-01 RX ADMIN — OXYCODONE HYDROCHLORIDE 15 MG: 15 TABLET ORAL at 23:46

## 2023-01-01 RX ADMIN — PREDNISONE 40 MG: 20 TABLET ORAL at 16:19

## 2023-01-01 RX ADMIN — CEFAZOLIN 2 G: 2 INJECTION, POWDER, FOR SOLUTION INTRAMUSCULAR; INTRAVENOUS at 15:24

## 2023-01-01 RX ADMIN — HYDROCORTISONE: 1 CREAM TOPICAL at 15:00

## 2023-01-01 RX ADMIN — FENTANYL CITRATE 50 MCG: 50 INJECTION, SOLUTION INTRAMUSCULAR; INTRAVENOUS at 10:43

## 2023-01-01 RX ADMIN — Medication 3 MG: at 21:24

## 2023-01-01 RX ADMIN — SENNOSIDES AND DOCUSATE SODIUM 2 TABLET: 50; 8.6 TABLET ORAL at 08:36

## 2023-01-01 RX ADMIN — PREGABALIN 150 MG: 150 CAPSULE ORAL at 15:47

## 2023-01-01 RX ADMIN — MORPHINE SULFATE 30 MG: 15 TABLET ORAL at 20:03

## 2023-01-01 RX ADMIN — SODIUM CHLORIDE 1 G: 1 TABLET ORAL at 11:42

## 2023-01-01 RX ADMIN — POLYETHYLENE GLYCOL 3350 1 PACKET: 17 POWDER, FOR SOLUTION ORAL at 05:18

## 2023-01-01 RX ADMIN — OXYCODONE HYDROCHLORIDE 10 MG: 10 TABLET ORAL at 14:32

## 2023-01-01 RX ADMIN — Medication 3 MG: at 21:52

## 2023-01-01 RX ADMIN — CEFDINIR 300 MG: 300 CAPSULE ORAL at 08:54

## 2023-01-01 RX ADMIN — HYDROMORPHONE HYDROCHLORIDE 0.5 MG: 1 INJECTION, SOLUTION INTRAMUSCULAR; INTRAVENOUS; SUBCUTANEOUS at 03:45

## 2023-01-01 RX ADMIN — POTASSIUM CHLORIDE 40 MEQ: 1500 TABLET, EXTENDED RELEASE ORAL at 05:38

## 2023-01-01 RX ADMIN — SODIUM HYPOCHLORITE 1 ML: 1.25 SOLUTION TOPICAL at 17:11

## 2023-01-01 RX ADMIN — PREGABALIN 100 MG: 100 CAPSULE ORAL at 11:41

## 2023-01-01 RX ADMIN — MORPHINE SULFATE 15 MG: 15 TABLET, FILM COATED, EXTENDED RELEASE ORAL at 21:26

## 2023-01-01 RX ADMIN — LINEZOLID 600 MG: 600 TABLET, FILM COATED ORAL at 17:38

## 2023-01-01 RX ADMIN — Medication 1000 UNITS: at 09:37

## 2023-01-01 RX ADMIN — NOREPINEPHRINE BITARTRATE 0.05 MCG/KG/MIN: 1 INJECTION INTRAVENOUS at 19:46

## 2023-01-01 RX ADMIN — Medication 5 MG: at 02:55

## 2023-01-01 RX ADMIN — AMIODARONE HYDROCHLORIDE 0.5 MG/MIN: 1.8 INJECTION, SOLUTION INTRAVENOUS at 11:23

## 2023-01-01 RX ADMIN — DULOXETINE HYDROCHLORIDE 60 MG: 30 CAPSULE, DELAYED RELEASE ORAL at 08:32

## 2023-01-01 RX ADMIN — LIDOCAINE HYDROCHLORIDE 20 MG: 20 INJECTION, SOLUTION EPIDURAL; INFILTRATION; INTRACAUDAL at 14:36

## 2023-01-01 RX ADMIN — MORPHINE SULFATE 30 MG: 15 TABLET ORAL at 13:17

## 2023-01-01 RX ADMIN — DAPAGLIFLOZIN 10 MG: 10 TABLET, FILM COATED ORAL at 08:05

## 2023-01-01 RX ADMIN — HYDROMORPHONE HYDROCHLORIDE 0.5 MG: 1 INJECTION, SOLUTION INTRAMUSCULAR; INTRAVENOUS; SUBCUTANEOUS at 00:45

## 2023-01-01 RX ADMIN — PREGABALIN 150 MG: 150 CAPSULE ORAL at 08:06

## 2023-01-01 RX ADMIN — LACTULOSE 30 ML: 20 SOLUTION ORAL at 17:55

## 2023-01-01 RX ADMIN — ACETAMINOPHEN 650 MG: 325 TABLET, FILM COATED ORAL at 08:24

## 2023-01-01 RX ADMIN — ACETAMINOPHEN 1000 MG: 500 TABLET ORAL at 08:22

## 2023-01-01 RX ADMIN — MORPHINE SULFATE 30 MG: 15 TABLET ORAL at 11:52

## 2023-01-01 RX ADMIN — OMEPRAZOLE 20 MG: 20 CAPSULE, DELAYED RELEASE ORAL at 08:07

## 2023-01-01 RX ADMIN — ACETAMINOPHEN 650 MG: 325 TABLET ORAL at 05:32

## 2023-01-01 RX ADMIN — OMEPRAZOLE 20 MG: 20 CAPSULE, DELAYED RELEASE ORAL at 21:16

## 2023-01-01 RX ADMIN — KETOCONAZOLE CREAM, 2%: 20 CREAM TOPICAL at 17:33

## 2023-01-01 RX ADMIN — MORPHINE SULFATE 30 MG: 30 TABLET, FILM COATED, EXTENDED RELEASE ORAL at 20:57

## 2023-01-01 RX ADMIN — MORPHINE SULFATE 30 MG: 30 TABLET, FILM COATED, EXTENDED RELEASE ORAL at 08:40

## 2023-01-01 RX ADMIN — ENOXAPARIN SODIUM 40 MG: 100 INJECTION SUBCUTANEOUS at 17:15

## 2023-01-01 RX ADMIN — CEFAZOLIN 2 G: 2 INJECTION, POWDER, FOR SOLUTION INTRAMUSCULAR; INTRAVENOUS at 06:11

## 2023-01-01 RX ADMIN — LORAZEPAM 1 MG: 2 INJECTION INTRAMUSCULAR; INTRAVENOUS at 20:29

## 2023-01-01 RX ADMIN — CEFAZOLIN 2 G: 2 INJECTION, POWDER, FOR SOLUTION INTRAMUSCULAR; INTRAVENOUS at 20:33

## 2023-01-01 RX ADMIN — FLUCONAZOLE 200 MG: 200 TABLET ORAL at 05:26

## 2023-01-01 RX ADMIN — FUROSEMIDE 40 MG: 10 INJECTION, SOLUTION INTRAVENOUS at 16:37

## 2023-01-01 RX ADMIN — DOCUSATE SODIUM 50 MG AND SENNOSIDES 8.6 MG 2 TABLET: 8.6; 5 TABLET, FILM COATED ORAL at 05:15

## 2023-01-01 RX ADMIN — MORPHINE SULFATE 15 MG: 15 TABLET, FILM COATED, EXTENDED RELEASE ORAL at 05:52

## 2023-01-01 RX ADMIN — CEFAZOLIN 2 G: 2 INJECTION, POWDER, FOR SOLUTION INTRAMUSCULAR; INTRAVENOUS at 14:55

## 2023-01-01 RX ADMIN — SIMETHICONE 125 MG: 125 TABLET, CHEWABLE ORAL at 08:37

## 2023-01-01 RX ADMIN — OMEPRAZOLE 20 MG: 20 CAPSULE, DELAYED RELEASE ORAL at 21:03

## 2023-01-01 RX ADMIN — MORPHINE SULFATE 15 MG: 15 TABLET, FILM COATED, EXTENDED RELEASE ORAL at 22:06

## 2023-01-01 RX ADMIN — PREGABALIN 150 MG: 150 CAPSULE ORAL at 21:39

## 2023-01-01 RX ADMIN — HYDROMORPHONE HYDROCHLORIDE 1 MG: 1 INJECTION, SOLUTION INTRAMUSCULAR; INTRAVENOUS; SUBCUTANEOUS at 20:25

## 2023-01-01 RX ADMIN — HEPARIN SODIUM 5000 UNITS: 5000 INJECTION, SOLUTION INTRAVENOUS; SUBCUTANEOUS at 05:54

## 2023-01-01 RX ADMIN — MORPHINE SULFATE 15 MG: 15 TABLET, FILM COATED, EXTENDED RELEASE ORAL at 20:26

## 2023-01-01 RX ADMIN — MORPHINE SULFATE 30 MG: 30 TABLET, FILM COATED, EXTENDED RELEASE ORAL at 08:38

## 2023-01-01 RX ADMIN — OMEPRAZOLE 20 MG: 20 CAPSULE, DELAYED RELEASE ORAL at 16:41

## 2023-01-01 RX ADMIN — MORPHINE SULFATE 30 MG: 15 TABLET ORAL at 22:31

## 2023-01-01 RX ADMIN — AMIODARONE HYDROCHLORIDE 0.5 MG/MIN: 1.8 INJECTION, SOLUTION INTRAVENOUS at 22:49

## 2023-01-01 RX ADMIN — METHOCARBAMOL TABLETS 750 MG: 750 TABLET, COATED ORAL at 21:17

## 2023-01-01 RX ADMIN — MORPHINE SULFATE 15 MG: 15 TABLET, FILM COATED, EXTENDED RELEASE ORAL at 08:52

## 2023-01-01 RX ADMIN — LACTULOSE 30 ML: 20 SOLUTION ORAL at 17:05

## 2023-01-01 RX ADMIN — CEFAZOLIN 2 G: 2 INJECTION, POWDER, FOR SOLUTION INTRAMUSCULAR; INTRAVENOUS at 21:42

## 2023-01-01 RX ADMIN — ERGOCALCIFEROL 50000 UNITS: 1.25 CAPSULE ORAL at 08:55

## 2023-01-01 RX ADMIN — HYDROMORPHONE HYDROCHLORIDE 0.5 MG: 1 INJECTION, SOLUTION INTRAMUSCULAR; INTRAVENOUS; SUBCUTANEOUS at 08:20

## 2023-01-01 RX ADMIN — ACETAMINOPHEN 1000 MG: 500 TABLET ORAL at 08:21

## 2023-01-01 RX ADMIN — OXYCODONE HYDROCHLORIDE 15 MG: 15 TABLET ORAL at 08:47

## 2023-01-01 RX ADMIN — HYDROMORPHONE HYDROCHLORIDE 0.5 MG: 1 INJECTION, SOLUTION INTRAMUSCULAR; INTRAVENOUS; SUBCUTANEOUS at 15:36

## 2023-01-01 RX ADMIN — HYDROMORPHONE HYDROCHLORIDE 1 MG: 1 INJECTION, SOLUTION INTRAMUSCULAR; INTRAVENOUS; SUBCUTANEOUS at 06:12

## 2023-01-01 RX ADMIN — OMEPRAZOLE 40 MG: 20 CAPSULE, DELAYED RELEASE ORAL at 04:34

## 2023-01-01 RX ADMIN — CEFAZOLIN 2 G: 2 INJECTION, POWDER, FOR SOLUTION INTRAMUSCULAR; INTRAVENOUS at 14:02

## 2023-01-01 RX ADMIN — ENOXAPARIN SODIUM 40 MG: 100 INJECTION SUBCUTANEOUS at 17:13

## 2023-01-01 RX ADMIN — ACETAMINOPHEN 1000 MG: 500 TABLET ORAL at 09:07

## 2023-01-01 RX ADMIN — MORPHINE SULFATE 30 MG: 15 TABLET ORAL at 13:59

## 2023-01-01 RX ADMIN — Medication 950 MG: at 08:56

## 2023-01-01 RX ADMIN — CEFAZOLIN 2 G: 2 INJECTION, POWDER, FOR SOLUTION INTRAMUSCULAR; INTRAVENOUS at 14:19

## 2023-01-01 RX ADMIN — PREGABALIN 150 MG: 150 CAPSULE ORAL at 08:35

## 2023-01-01 RX ADMIN — ACETAMINOPHEN 650 MG: 325 TABLET ORAL at 05:15

## 2023-01-01 RX ADMIN — IOHEXOL 90 ML: 350 INJECTION, SOLUTION INTRAVENOUS at 10:15

## 2023-01-01 RX ADMIN — Medication 1 TABLET: at 11:42

## 2023-01-01 RX ADMIN — HYDROMORPHONE HYDROCHLORIDE 0.5 MG: 1 INJECTION, SOLUTION INTRAMUSCULAR; INTRAVENOUS; SUBCUTANEOUS at 12:32

## 2023-01-01 RX ADMIN — HYDROMORPHONE HYDROCHLORIDE 0.4 MG: 1 INJECTION, SOLUTION INTRAMUSCULAR; INTRAVENOUS; SUBCUTANEOUS at 17:49

## 2023-01-01 RX ADMIN — SODIUM CHLORIDE, POTASSIUM CHLORIDE, SODIUM LACTATE AND CALCIUM CHLORIDE: 600; 310; 30; 20 INJECTION, SOLUTION INTRAVENOUS at 13:45

## 2023-01-01 RX ADMIN — MORPHINE SULFATE 15 MG: 15 TABLET, FILM COATED, EXTENDED RELEASE ORAL at 17:20

## 2023-01-01 RX ADMIN — Medication 950 MG: at 06:44

## 2023-01-01 RX ADMIN — PROPOFOL 200 MG: 10 INJECTION, EMULSION INTRAVENOUS at 09:54

## 2023-01-01 RX ADMIN — DOCUSATE SODIUM 10 MG: 50 LIQUID ORAL at 16:51

## 2023-01-01 RX ADMIN — MORPHINE SULFATE 15 MG: 15 TABLET, FILM COATED, EXTENDED RELEASE ORAL at 09:09

## 2023-01-01 RX ADMIN — MORPHINE SULFATE 30 MG: 15 TABLET ORAL at 21:16

## 2023-01-01 RX ADMIN — KETOCONAZOLE CREAM, 2%: 20 CREAM TOPICAL at 17:42

## 2023-01-01 RX ADMIN — MORPHINE SULFATE 30 MG: 15 TABLET ORAL at 17:52

## 2023-01-01 RX ADMIN — THIAMINE HCL TAB 100 MG 100 MG: 100 TAB at 08:20

## 2023-01-01 RX ADMIN — MIDODRINE HYDROCHLORIDE 10 MG: 5 TABLET ORAL at 21:47

## 2023-01-01 RX ADMIN — Medication 1000 UNITS: at 08:14

## 2023-01-01 RX ADMIN — HEPARIN SODIUM 5000 UNITS: 5000 INJECTION, SOLUTION INTRAVENOUS; SUBCUTANEOUS at 05:26

## 2023-01-01 RX ADMIN — SODIUM CHLORIDE: 9 INJECTION, SOLUTION INTRAVENOUS at 06:43

## 2023-01-01 RX ADMIN — DRONABINOL 2.5 MG: 2.5 CAPSULE ORAL at 17:45

## 2023-01-01 RX ADMIN — MORPHINE SULFATE 30 MG: 15 TABLET ORAL at 01:59

## 2023-01-01 RX ADMIN — MORPHINE SULFATE 15 MG: 15 TABLET, FILM COATED, EXTENDED RELEASE ORAL at 21:53

## 2023-01-01 RX ADMIN — MORPHINE SULFATE 15 MG: 15 TABLET ORAL at 08:20

## 2023-01-01 RX ADMIN — HYDROCORTISONE SODIUM SUCCINATE 25 MG: 100 INJECTION, POWDER, FOR SOLUTION INTRAMUSCULAR; INTRAVENOUS at 17:08

## 2023-01-01 RX ADMIN — DULOXETINE HYDROCHLORIDE 60 MG: 60 CAPSULE, DELAYED RELEASE ORAL at 05:09

## 2023-01-01 RX ADMIN — CALCIUM CHLORIDE 1 G: 100 INJECTION, SOLUTION INTRAVENOUS; INTRAVENTRICULAR at 17:34

## 2023-01-01 RX ADMIN — NYSTATIN: 100000 POWDER TOPICAL at 17:18

## 2023-01-01 RX ADMIN — THERA TABS 1 TABLET: TAB at 08:34

## 2023-01-01 RX ADMIN — OMEPRAZOLE 40 MG: 20 CAPSULE, DELAYED RELEASE ORAL at 06:10

## 2023-01-01 RX ADMIN — THIAMINE HCL TAB 100 MG 100 MG: 100 TAB at 09:14

## 2023-01-01 RX ADMIN — MORPHINE SULFATE 30 MG: 15 TABLET ORAL at 05:34

## 2023-01-01 RX ADMIN — NICOTINE 7 MG: 7 PATCH, EXTENDED RELEASE TRANSDERMAL at 05:35

## 2023-01-01 RX ADMIN — MORPHINE SULFATE 30 MG: 30 TABLET, FILM COATED, EXTENDED RELEASE ORAL at 05:08

## 2023-01-01 RX ADMIN — MORPHINE SULFATE 30 MG: 15 TABLET ORAL at 17:31

## 2023-01-01 RX ADMIN — ENOXAPARIN SODIUM 40 MG: 100 INJECTION SUBCUTANEOUS at 17:32

## 2023-01-01 RX ADMIN — AMIODARONE HYDROCHLORIDE 150 MG: 1.5 INJECTION, SOLUTION INTRAVENOUS at 19:52

## 2023-01-01 RX ADMIN — ACETAMINOPHEN 1000 MG: 500 TABLET, FILM COATED ORAL at 13:45

## 2023-01-01 RX ADMIN — CEFEPIME 2 G: 2 INJECTION, POWDER, FOR SOLUTION INTRAVENOUS at 22:06

## 2023-01-01 RX ADMIN — MORPHINE SULFATE 30 MG: 15 TABLET ORAL at 06:19

## 2023-01-01 RX ADMIN — POTASSIUM CHLORIDE 10 MEQ: 1500 TABLET, EXTENDED RELEASE ORAL at 08:56

## 2023-01-01 RX ADMIN — THERA TABS 1 TABLET: TAB at 09:00

## 2023-01-01 RX ADMIN — PREGABALIN 150 MG: 150 CAPSULE ORAL at 15:11

## 2023-01-01 RX ADMIN — POTASSIUM PHOSPHATE, MONOBASIC POTASSIUM PHOSPHATE, DIBASIC 30 MMOL: 224; 236 INJECTION, SOLUTION, CONCENTRATE INTRAVENOUS at 08:26

## 2023-01-01 RX ADMIN — CEFAZOLIN 2 G: 1 INJECTION, POWDER, FOR SOLUTION INTRAMUSCULAR; INTRAVENOUS at 08:39

## 2023-01-01 RX ADMIN — Medication 1 TABLET: at 11:38

## 2023-01-01 RX ADMIN — Medication 1 APPLICATOR: at 17:46

## 2023-01-01 RX ADMIN — LORAZEPAM 1 MG: 2 INJECTION INTRAMUSCULAR; INTRAVENOUS at 19:28

## 2023-01-01 RX ADMIN — PREGABALIN 200 MG: 100 CAPSULE ORAL at 14:41

## 2023-01-01 RX ADMIN — ACETAMINOPHEN 650 MG: 325 TABLET ORAL at 21:42

## 2023-01-01 RX ADMIN — MORPHINE SULFATE 30 MG: 15 TABLET ORAL at 22:00

## 2023-01-01 RX ADMIN — MAGNESIUM HYDROXIDE 30 ML: 400 SUSPENSION ORAL at 17:19

## 2023-01-01 RX ADMIN — HYDROCORTISONE: 1 CREAM TOPICAL at 08:42

## 2023-01-01 RX ADMIN — MAGNESIUM HYDROXIDE 30 ML: 400 SUSPENSION ORAL at 17:22

## 2023-01-01 RX ADMIN — DIBASIC SODIUM PHOSPHATE, MONOBASIC POTASSIUM PHOSPHATE AND MONOBASIC SODIUM PHOSPHATE 500 MG: 852; 155; 130 TABLET ORAL at 11:31

## 2023-01-01 RX ADMIN — HYDROMORPHONE HYDROCHLORIDE 0.5 MG: 1 INJECTION, SOLUTION INTRAMUSCULAR; INTRAVENOUS; SUBCUTANEOUS at 05:32

## 2023-01-01 RX ADMIN — HYDROMORPHONE HYDROCHLORIDE 0.5 MG: 1 INJECTION, SOLUTION INTRAMUSCULAR; INTRAVENOUS; SUBCUTANEOUS at 10:11

## 2023-01-01 RX ADMIN — CEFAZOLIN 2 G: 2 INJECTION, POWDER, FOR SOLUTION INTRAMUSCULAR; INTRAVENOUS at 06:40

## 2023-01-01 RX ADMIN — HYDROMORPHONE HYDROCHLORIDE 1 MG: 1 INJECTION, SOLUTION INTRAMUSCULAR; INTRAVENOUS; SUBCUTANEOUS at 22:42

## 2023-01-01 RX ADMIN — SENNOSIDES AND DOCUSATE SODIUM 2 TABLET: 50; 8.6 TABLET ORAL at 20:35

## 2023-01-01 RX ADMIN — MORPHINE SULFATE 30 MG: 15 TABLET ORAL at 21:50

## 2023-01-01 RX ADMIN — OMEPRAZOLE 20 MG: 20 CAPSULE, DELAYED RELEASE ORAL at 08:56

## 2023-01-01 RX ADMIN — NOREPINEPHRINE BITARTRATE 0.05 MCG/KG/MIN: 1 INJECTION INTRAVENOUS at 13:04

## 2023-01-01 RX ADMIN — SPIRONOLACTONE 25 MG: 25 TABLET ORAL at 05:29

## 2023-01-01 RX ADMIN — FUROSEMIDE 20 MG: 20 TABLET ORAL at 09:36

## 2023-01-01 RX ADMIN — MORPHINE SULFATE 30 MG: 15 TABLET ORAL at 06:08

## 2023-01-01 RX ADMIN — OXYCODONE HYDROCHLORIDE 10 MG: 10 TABLET ORAL at 10:29

## 2023-01-01 RX ADMIN — PREDNISONE 10 MG: 5 TABLET ORAL at 21:24

## 2023-01-01 RX ADMIN — DOCUSATE SODIUM 50 MG AND SENNOSIDES 8.6 MG 2 TABLET: 8.6; 5 TABLET, FILM COATED ORAL at 18:12

## 2023-01-01 RX ADMIN — DOCUSATE SODIUM 50 MG AND SENNOSIDES 8.6 MG 2 TABLET: 8.6; 5 TABLET, FILM COATED ORAL at 05:48

## 2023-01-01 RX ADMIN — ACETAMINOPHEN 1000 MG: 500 TABLET ORAL at 16:52

## 2023-01-01 RX ADMIN — MORPHINE SULFATE 30 MG: 15 TABLET ORAL at 10:20

## 2023-01-01 RX ADMIN — MORPHINE SULFATE 15 MG: 15 TABLET, FILM COATED, EXTENDED RELEASE ORAL at 08:44

## 2023-01-01 RX ADMIN — LACTULOSE 30 ML: 20 SOLUTION ORAL at 22:07

## 2023-01-01 RX ADMIN — ENOXAPARIN SODIUM 40 MG: 100 INJECTION SUBCUTANEOUS at 17:21

## 2023-01-01 RX ADMIN — CEFEPIME 2 G: 2 INJECTION, POWDER, FOR SOLUTION INTRAVENOUS at 22:20

## 2023-01-01 RX ADMIN — LINEZOLID 600 MG: 600 TABLET, FILM COATED ORAL at 05:19

## 2023-01-01 RX ADMIN — METHOCARBAMOL TABLETS 750 MG: 750 TABLET, COATED ORAL at 00:14

## 2023-01-01 RX ADMIN — MORPHINE SULFATE 30 MG: 30 TABLET, FILM COATED, EXTENDED RELEASE ORAL at 08:44

## 2023-01-01 RX ADMIN — DULOXETINE HYDROCHLORIDE 60 MG: 30 CAPSULE, DELAYED RELEASE ORAL at 08:04

## 2023-01-01 RX ADMIN — MORPHINE SULFATE 30 MG: 30 TABLET, FILM COATED, EXTENDED RELEASE ORAL at 21:18

## 2023-01-01 RX ADMIN — SENNOSIDES AND DOCUSATE SODIUM 2 TABLET: 8.6; 5 TABLET ORAL at 08:25

## 2023-01-01 RX ADMIN — MORPHINE SULFATE 30 MG: 15 TABLET ORAL at 08:40

## 2023-01-01 RX ADMIN — ACETAMINOPHEN 650 MG: 325 TABLET ORAL at 12:01

## 2023-01-01 RX ADMIN — MINERAL OIL, PETROLATUM 1 APPLICATION: 425; 573 OINTMENT OPHTHALMIC at 14:59

## 2023-01-01 RX ADMIN — ACETAMINOPHEN 1000 MG: 500 TABLET ORAL at 08:26

## 2023-01-01 RX ADMIN — MORPHINE SULFATE 30 MG: 30 TABLET, FILM COATED, EXTENDED RELEASE ORAL at 21:12

## 2023-01-01 RX ADMIN — SIMETHICONE 125 MG: 125 TABLET, CHEWABLE ORAL at 14:41

## 2023-01-01 RX ADMIN — HYDROMORPHONE HYDROCHLORIDE 1 MG: 1 INJECTION, SOLUTION INTRAMUSCULAR; INTRAVENOUS; SUBCUTANEOUS at 14:46

## 2023-01-01 RX ADMIN — KETOCONAZOLE CREAM, 2%: 20 CREAM TOPICAL at 05:16

## 2023-01-01 RX ADMIN — FUROSEMIDE 40 MG: 10 INJECTION, SOLUTION INTRAVENOUS at 17:46

## 2023-01-01 RX ADMIN — ACETAMINOPHEN 1000 MG: 500 TABLET ORAL at 08:35

## 2023-01-01 RX ADMIN — APIXABAN 5 MG: 5 TABLET, FILM COATED ORAL at 08:43

## 2023-01-01 RX ADMIN — HYDROCORTISONE SODIUM SUCCINATE 50 MG: 100 INJECTION, POWDER, FOR SOLUTION INTRAMUSCULAR; INTRAVENOUS at 05:51

## 2023-01-01 RX ADMIN — HYDROMORPHONE HYDROCHLORIDE 0.5 MG: 1 INJECTION, SOLUTION INTRAMUSCULAR; INTRAVENOUS; SUBCUTANEOUS at 23:48

## 2023-01-01 RX ADMIN — ZOLPIDEM TARTRATE 5 MG: 5 TABLET ORAL at 23:05

## 2023-01-01 RX ADMIN — ACETAMINOPHEN 1000 MG: 500 TABLET, FILM COATED ORAL at 05:28

## 2023-01-01 RX ADMIN — MORPHINE SULFATE 30 MG: 15 TABLET ORAL at 17:19

## 2023-01-01 RX ADMIN — OXYCODONE HYDROCHLORIDE 15 MG: 15 TABLET ORAL at 21:51

## 2023-01-01 RX ADMIN — DULOXETINE HYDROCHLORIDE 60 MG: 30 CAPSULE, DELAYED RELEASE ORAL at 08:54

## 2023-01-01 RX ADMIN — METHYLPREDNISOLONE 4 MG: 4 TABLET ORAL at 17:29

## 2023-01-01 RX ADMIN — SENNOSIDES AND DOCUSATE SODIUM 2 TABLET: 50; 8.6 TABLET ORAL at 21:26

## 2023-01-01 RX ADMIN — METHOCARBAMOL TABLETS 750 MG: 750 TABLET, COATED ORAL at 16:01

## 2023-01-01 RX ADMIN — Medication 950 MG: at 07:50

## 2023-01-01 RX ADMIN — PREGABALIN 150 MG: 150 CAPSULE ORAL at 16:27

## 2023-01-01 RX ADMIN — MICONAZOLE NITRATE: 20 CREAM TOPICAL at 00:00

## 2023-01-01 RX ADMIN — ACETAMINOPHEN 650 MG: 325 TABLET ORAL at 00:07

## 2023-01-01 RX ADMIN — OXYCODONE HYDROCHLORIDE 15 MG: 15 TABLET ORAL at 08:40

## 2023-01-01 RX ADMIN — SENNOSIDES AND DOCUSATE SODIUM 2 TABLET: 50; 8.6 TABLET ORAL at 21:57

## 2023-01-01 RX ADMIN — GABAPENTIN 300 MG: 300 CAPSULE ORAL at 11:54

## 2023-01-01 RX ADMIN — PREGABALIN 150 MG: 150 CAPSULE ORAL at 21:16

## 2023-01-01 RX ADMIN — CEFEPIME 2 G: 2 INJECTION, POWDER, FOR SOLUTION INTRAVENOUS at 05:17

## 2023-01-01 RX ADMIN — MORPHINE SULFATE 30 MG: 15 TABLET ORAL at 09:47

## 2023-01-01 RX ADMIN — ACETAMINOPHEN 650 MG: 325 TABLET, FILM COATED ORAL at 00:14

## 2023-01-01 RX ADMIN — AMIODARONE HYDROCHLORIDE 400 MG: 200 TABLET ORAL at 06:00

## 2023-01-01 RX ADMIN — OMEPRAZOLE 20 MG: 20 CAPSULE, DELAYED RELEASE ORAL at 09:09

## 2023-01-01 RX ADMIN — ZOLPIDEM TARTRATE 5 MG: 5 TABLET ORAL at 00:33

## 2023-01-01 RX ADMIN — PREGABALIN 150 MG: 150 CAPSULE ORAL at 05:15

## 2023-01-01 RX ADMIN — LACTULOSE 30 ML: 20 SOLUTION ORAL at 11:42

## 2023-01-01 RX ADMIN — Medication 950 MG: at 05:16

## 2023-01-01 RX ADMIN — FUROSEMIDE 20 MG: 20 TABLET ORAL at 05:28

## 2023-01-01 RX ADMIN — HEPARIN SODIUM 3800 UNITS: 1000 INJECTION, SOLUTION INTRAVENOUS; SUBCUTANEOUS at 06:15

## 2023-01-01 RX ADMIN — MORPHINE SULFATE 15 MG: 15 TABLET, FILM COATED, EXTENDED RELEASE ORAL at 17:40

## 2023-01-01 RX ADMIN — HYDROMORPHONE HYDROCHLORIDE 1 MG: 1 INJECTION, SOLUTION INTRAMUSCULAR; INTRAVENOUS; SUBCUTANEOUS at 13:47

## 2023-01-01 RX ADMIN — HEPARIN SODIUM 5000 UNITS: 5000 INJECTION, SOLUTION INTRAVENOUS; SUBCUTANEOUS at 05:39

## 2023-01-01 RX ADMIN — MORPHINE SULFATE 30 MG: 15 TABLET ORAL at 06:39

## 2023-01-01 RX ADMIN — MORPHINE SULFATE 30 MG: 15 TABLET ORAL at 14:16

## 2023-01-01 RX ADMIN — PREDNISONE 10 MG: 5 TABLET ORAL at 08:15

## 2023-01-01 RX ADMIN — PREGABALIN 200 MG: 100 CAPSULE ORAL at 08:39

## 2023-01-01 RX ADMIN — MORPHINE SULFATE 15 MG: 15 TABLET ORAL at 08:14

## 2023-01-01 RX ADMIN — Medication 950 MG: at 04:47

## 2023-01-01 RX ADMIN — DULOXETINE HYDROCHLORIDE 60 MG: 30 CAPSULE, DELAYED RELEASE ORAL at 07:50

## 2023-01-01 RX ADMIN — DIBASIC SODIUM PHOSPHATE, MONOBASIC POTASSIUM PHOSPHATE AND MONOBASIC SODIUM PHOSPHATE 500 MG: 852; 155; 130 TABLET ORAL at 12:36

## 2023-01-01 RX ADMIN — MORPHINE SULFATE 30 MG: 15 TABLET ORAL at 08:11

## 2023-01-01 RX ADMIN — MORPHINE SULFATE 30 MG: 15 TABLET ORAL at 14:28

## 2023-01-01 RX ADMIN — GABAPENTIN 300 MG: 300 CAPSULE ORAL at 21:19

## 2023-01-01 RX ADMIN — PREGABALIN 150 MG: 150 CAPSULE ORAL at 08:50

## 2023-01-01 RX ADMIN — Medication 3 MG: at 20:41

## 2023-01-01 RX ADMIN — APIXABAN 5 MG: 5 TABLET, FILM COATED ORAL at 05:34

## 2023-01-01 RX ADMIN — OMEPRAZOLE 20 MG: 20 CAPSULE, DELAYED RELEASE ORAL at 17:51

## 2023-01-01 RX ADMIN — FLUCONAZOLE 100 MG: 100 TABLET ORAL at 17:42

## 2023-01-01 RX ADMIN — DOCUSATE SODIUM 50 MG AND SENNOSIDES 8.6 MG 2 TABLET: 8.6; 5 TABLET, FILM COATED ORAL at 05:24

## 2023-01-01 RX ADMIN — CEFEPIME 2 G: 2 INJECTION, POWDER, FOR SOLUTION INTRAVENOUS at 15:44

## 2023-01-01 RX ADMIN — OXYCODONE HYDROCHLORIDE 10 MG: 10 TABLET ORAL at 15:11

## 2023-01-01 RX ADMIN — CEFEPIME 2 G: 2 INJECTION, POWDER, FOR SOLUTION INTRAVENOUS at 10:11

## 2023-01-01 RX ADMIN — PREGABALIN 150 MG: 150 CAPSULE ORAL at 21:55

## 2023-01-01 RX ADMIN — Medication 1000 UNITS: at 08:39

## 2023-01-01 RX ADMIN — OMEPRAZOLE 20 MG: 20 CAPSULE, DELAYED RELEASE ORAL at 08:04

## 2023-01-01 RX ADMIN — ZOLPIDEM TARTRATE 10 MG: 5 TABLET ORAL at 23:54

## 2023-01-01 RX ADMIN — GABAPENTIN 300 MG: 300 CAPSULE ORAL at 08:54

## 2023-01-01 RX ADMIN — AMIODARONE HYDROCHLORIDE 0.5 MG/MIN: 1.8 INJECTION, SOLUTION INTRAVENOUS at 08:48

## 2023-01-01 RX ADMIN — GABAPENTIN 300 MG: 300 CAPSULE ORAL at 18:12

## 2023-01-01 RX ADMIN — ACETAMINOPHEN 650 MG: 325 TABLET ORAL at 12:00

## 2023-01-01 RX ADMIN — ACETAMINOPHEN 1000 MG: 500 TABLET ORAL at 20:25

## 2023-01-01 RX ADMIN — Medication 1000 UNITS: at 08:15

## 2023-01-01 RX ADMIN — DOCUSATE SODIUM 50 MG AND SENNOSIDES 8.6 MG 2 TABLET: 8.6; 5 TABLET, FILM COATED ORAL at 06:22

## 2023-01-01 RX ADMIN — PREGABALIN 150 MG: 150 CAPSULE ORAL at 08:32

## 2023-01-01 RX ADMIN — ACETAMINOPHEN 650 MG: 325 TABLET ORAL at 11:45

## 2023-01-01 RX ADMIN — MORPHINE SULFATE 15 MG: 15 TABLET ORAL at 22:49

## 2023-01-01 RX ADMIN — OXYCODONE HYDROCHLORIDE 15 MG: 15 TABLET ORAL at 17:11

## 2023-01-01 RX ADMIN — MORPHINE SULFATE 30 MG: 30 TABLET, FILM COATED, EXTENDED RELEASE ORAL at 17:38

## 2023-01-01 RX ADMIN — CEFAZOLIN 2 G: 2 INJECTION, POWDER, FOR SOLUTION INTRAMUSCULAR; INTRAVENOUS at 22:19

## 2023-01-01 RX ADMIN — MORPHINE SULFATE 30 MG: 15 TABLET ORAL at 17:55

## 2023-01-01 RX ADMIN — PREGABALIN 100 MG: 100 CAPSULE ORAL at 08:46

## 2023-01-01 RX ADMIN — CEFAZOLIN 2 G: 2 INJECTION, POWDER, FOR SOLUTION INTRAMUSCULAR; INTRAVENOUS at 14:24

## 2023-01-01 RX ADMIN — NOREPINEPHRINE BITARTRATE 0.1 MCG/KG/MIN: 1 INJECTION, SOLUTION, CONCENTRATE INTRAVENOUS at 23:59

## 2023-01-01 RX ADMIN — ACETAMINOPHEN 1000 MG: 500 TABLET ORAL at 15:26

## 2023-01-01 RX ADMIN — ACETAMINOPHEN 650 MG: 325 TABLET ORAL at 05:25

## 2023-01-01 RX ADMIN — PREGABALIN 150 MG: 150 CAPSULE ORAL at 08:24

## 2023-01-01 RX ADMIN — OMEPRAZOLE 20 MG: 20 CAPSULE, DELAYED RELEASE ORAL at 21:23

## 2023-01-01 RX ADMIN — MORPHINE SULFATE 30 MG: 15 TABLET ORAL at 22:33

## 2023-01-01 RX ADMIN — PREGABALIN 100 MG: 100 CAPSULE ORAL at 17:00

## 2023-01-01 RX ADMIN — GABAPENTIN 300 MG: 300 CAPSULE ORAL at 08:31

## 2023-01-01 RX ADMIN — SIMETHICONE 125 MG: 125 TABLET, CHEWABLE ORAL at 14:21

## 2023-01-01 RX ADMIN — CEFAZOLIN 2 G: 2 INJECTION, POWDER, FOR SOLUTION INTRAMUSCULAR; INTRAVENOUS at 08:15

## 2023-01-01 RX ADMIN — MORPHINE SULFATE 30 MG: 15 TABLET ORAL at 01:06

## 2023-01-01 RX ADMIN — ACETAMINOPHEN 1000 MG: 500 TABLET ORAL at 08:16

## 2023-01-01 RX ADMIN — Medication 950 MG: at 05:23

## 2023-01-01 RX ADMIN — PIPERACILLIN AND TAZOBACTAM 3.38 G: 3; .375 INJECTION, POWDER, LYOPHILIZED, FOR SOLUTION INTRAVENOUS; PARENTERAL at 11:50

## 2023-01-01 RX ADMIN — MORPHINE SULFATE 30 MG: 15 TABLET ORAL at 11:34

## 2023-01-01 RX ADMIN — DULOXETINE HYDROCHLORIDE 60 MG: 60 CAPSULE, DELAYED RELEASE ORAL at 05:15

## 2023-01-01 RX ADMIN — LINEZOLID 600 MG: 600 TABLET, FILM COATED ORAL at 08:36

## 2023-01-01 RX ADMIN — OMEPRAZOLE 40 MG: 20 CAPSULE, DELAYED RELEASE ORAL at 05:22

## 2023-01-01 RX ADMIN — SODIUM CHLORIDE: 9 INJECTION, SOLUTION INTRAVENOUS at 14:12

## 2023-01-01 RX ADMIN — MICONAZOLE NITRATE: 20 CREAM TOPICAL at 09:00

## 2023-01-01 RX ADMIN — NOREPINEPHRINE BITARTRATE 0.12 MCG/KG/MIN: 1 INJECTION INTRAVENOUS at 17:14

## 2023-01-01 RX ADMIN — CEFEPIME 2 G: 2 INJECTION, POWDER, FOR SOLUTION INTRAVENOUS at 17:23

## 2023-01-01 RX ADMIN — MORPHINE SULFATE 30 MG: 15 TABLET ORAL at 18:01

## 2023-01-01 RX ADMIN — CEFEPIME 2 G: 2 INJECTION, POWDER, FOR SOLUTION INTRAVENOUS at 22:44

## 2023-01-01 RX ADMIN — PIPERACILLIN AND TAZOBACTAM 4.5 G: 4; .5 INJECTION, POWDER, FOR SOLUTION INTRAVENOUS at 21:01

## 2023-01-01 RX ADMIN — HYDROMORPHONE HYDROCHLORIDE 1 MG: 1 INJECTION, SOLUTION INTRAMUSCULAR; INTRAVENOUS; SUBCUTANEOUS at 14:26

## 2023-01-01 RX ADMIN — SIMETHICONE 125 MG: 125 TABLET, CHEWABLE ORAL at 20:35

## 2023-01-01 RX ADMIN — IPRATROPIUM BROMIDE AND ALBUTEROL SULFATE 3 ML: .5; 2.5 SOLUTION RESPIRATORY (INHALATION) at 11:44

## 2023-01-01 RX ADMIN — MORPHINE SULFATE 30 MG: 15 TABLET ORAL at 18:29

## 2023-01-01 RX ADMIN — MORPHINE SULFATE 30 MG: 15 TABLET ORAL at 12:52

## 2023-01-01 RX ADMIN — PREGABALIN 150 MG: 150 CAPSULE ORAL at 08:43

## 2023-01-01 RX ADMIN — MORPHINE SULFATE 30 MG: 30 TABLET, FILM COATED, EXTENDED RELEASE ORAL at 05:15

## 2023-01-01 RX ADMIN — ACETAMINOPHEN 650 MG: 325 TABLET ORAL at 20:23

## 2023-01-01 RX ADMIN — ENOXAPARIN SODIUM 40 MG: 100 INJECTION SUBCUTANEOUS at 17:03

## 2023-01-01 RX ADMIN — ACETAMINOPHEN 650 MG: 325 TABLET, FILM COATED ORAL at 17:16

## 2023-01-01 RX ADMIN — POTASSIUM CHLORIDE 10 MEQ: 1500 TABLET, EXTENDED RELEASE ORAL at 09:37

## 2023-01-01 RX ADMIN — MORPHINE SULFATE 30 MG: 15 TABLET ORAL at 15:17

## 2023-01-01 RX ADMIN — MORPHINE SULFATE 30 MG: 15 TABLET ORAL at 13:12

## 2023-01-01 RX ADMIN — ONDANSETRON 4 MG: 2 INJECTION INTRAMUSCULAR; INTRAVENOUS at 09:38

## 2023-01-01 RX ADMIN — DAPAGLIFLOZIN 10 MG: 10 TABLET, FILM COATED ORAL at 05:38

## 2023-01-01 RX ADMIN — ACETAMINOPHEN 1000 MG: 500 TABLET ORAL at 14:31

## 2023-01-01 RX ADMIN — PREGABALIN 100 MG: 100 CAPSULE ORAL at 08:30

## 2023-01-01 RX ADMIN — Medication 950 MG: at 08:26

## 2023-01-01 RX ADMIN — MORPHINE SULFATE 30 MG: 15 TABLET ORAL at 19:30

## 2023-01-01 RX ADMIN — SODIUM CHLORIDE: 9 INJECTION, SOLUTION INTRAVENOUS at 15:12

## 2023-01-01 RX ADMIN — IOHEXOL 120 ML: 350 INJECTION, SOLUTION INTRAVENOUS at 17:27

## 2023-01-01 RX ADMIN — Medication 3 MG: at 20:56

## 2023-01-01 RX ADMIN — MORPHINE SULFATE 30 MG: 15 TABLET ORAL at 10:59

## 2023-01-01 RX ADMIN — DULOXETINE HYDROCHLORIDE 60 MG: 60 CAPSULE, DELAYED RELEASE ORAL at 05:29

## 2023-01-01 RX ADMIN — MORPHINE SULFATE 30 MG: 15 TABLET ORAL at 20:27

## 2023-01-01 RX ADMIN — THERA TABS 1 TABLET: TAB at 08:39

## 2023-01-01 RX ADMIN — Medication 200 MCG: at 17:27

## 2023-01-01 RX ADMIN — PREGABALIN 150 MG: 150 CAPSULE ORAL at 20:25

## 2023-01-01 RX ADMIN — METOCLOPRAMIDE 10 MG: 5 INJECTION, SOLUTION INTRAMUSCULAR; INTRAVENOUS at 09:38

## 2023-01-01 RX ADMIN — FUROSEMIDE 60 MG: 10 INJECTION, SOLUTION INTRAMUSCULAR; INTRAVENOUS at 13:44

## 2023-01-01 RX ADMIN — SENNOSIDES AND DOCUSATE SODIUM 2 TABLET: 50; 8.6 TABLET ORAL at 21:18

## 2023-01-01 RX ADMIN — Medication 2000 UNITS: at 08:49

## 2023-01-01 RX ADMIN — POTASSIUM CHLORIDE 20 MEQ: 14.9 INJECTION, SOLUTION INTRAVENOUS at 19:24

## 2023-01-01 RX ADMIN — LIDOCAINE 2 PATCH: 700 PATCH TOPICAL at 14:45

## 2023-01-01 RX ADMIN — OXYCODONE HYDROCHLORIDE 10 MG: 10 TABLET ORAL at 21:16

## 2023-01-01 RX ADMIN — MORPHINE SULFATE 30 MG: 15 TABLET ORAL at 05:20

## 2023-01-01 RX ADMIN — FUROSEMIDE 60 MG: 10 INJECTION, SOLUTION INTRAMUSCULAR; INTRAVENOUS at 22:49

## 2023-01-01 RX ADMIN — CEFTRIAXONE SODIUM 2000 MG: 10 INJECTION, POWDER, FOR SOLUTION INTRAVENOUS at 05:18

## 2023-01-01 RX ADMIN — HYDROMORPHONE HYDROCHLORIDE 0.5 MG: 1 INJECTION, SOLUTION INTRAMUSCULAR; INTRAVENOUS; SUBCUTANEOUS at 12:21

## 2023-01-01 RX ADMIN — METHOCARBAMOL TABLETS 750 MG: 750 TABLET, COATED ORAL at 06:21

## 2023-01-01 RX ADMIN — DULOXETINE HYDROCHLORIDE 60 MG: 60 CAPSULE, DELAYED RELEASE ORAL at 05:08

## 2023-01-01 RX ADMIN — ASPIRIN 324 MG: 81 TABLET, CHEWABLE ORAL at 17:04

## 2023-01-01 RX ADMIN — OXYCODONE HYDROCHLORIDE 10 MG: 10 TABLET ORAL at 05:09

## 2023-01-01 RX ADMIN — LINEZOLID 600 MG: 600 INJECTION, SOLUTION INTRAVENOUS at 08:38

## 2023-01-01 RX ADMIN — MORPHINE SULFATE 30 MG: 15 TABLET ORAL at 08:13

## 2023-01-01 RX ADMIN — SODIUM CHLORIDE 1 G: 1 TABLET ORAL at 20:44

## 2023-01-01 RX ADMIN — THERA TABS 1 TABLET: TAB at 08:33

## 2023-01-01 RX ADMIN — Medication 950 MG: at 08:32

## 2023-01-01 RX ADMIN — DULOXETINE HYDROCHLORIDE 60 MG: 30 CAPSULE, DELAYED RELEASE ORAL at 09:12

## 2023-01-01 RX ADMIN — THIAMINE HCL TAB 100 MG 100 MG: 100 TAB at 08:21

## 2023-01-01 RX ADMIN — OMEPRAZOLE 20 MG: 20 CAPSULE, DELAYED RELEASE ORAL at 08:12

## 2023-01-01 RX ADMIN — MIDODRINE HYDROCHLORIDE 5 MG: 5 TABLET ORAL at 17:01

## 2023-01-01 RX ADMIN — MORPHINE SULFATE 30 MG: 30 TABLET, FILM COATED, EXTENDED RELEASE ORAL at 20:04

## 2023-01-01 RX ADMIN — SENNOSIDES AND DOCUSATE SODIUM 2 TABLET: 50; 8.6 TABLET ORAL at 08:23

## 2023-01-01 RX ADMIN — DOCUSATE SODIUM 50 MG AND SENNOSIDES 8.6 MG 2 TABLET: 8.6; 5 TABLET, FILM COATED ORAL at 05:47

## 2023-01-01 RX ADMIN — MORPHINE SULFATE 30 MG: 15 TABLET ORAL at 08:26

## 2023-01-01 RX ADMIN — PREGABALIN 150 MG: 150 CAPSULE ORAL at 08:39

## 2023-01-01 RX ADMIN — NICOTINE 7 MG: 7 PATCH TRANSDERMAL at 04:54

## 2023-01-01 RX ADMIN — CEFAZOLIN 2 G: 2 INJECTION, POWDER, FOR SOLUTION INTRAMUSCULAR; INTRAVENOUS at 21:25

## 2023-01-01 RX ADMIN — FUROSEMIDE 40 MG: 10 INJECTION, SOLUTION INTRAMUSCULAR; INTRAVENOUS at 15:32

## 2023-01-01 RX ADMIN — MORPHINE SULFATE 30 MG: 15 TABLET ORAL at 11:23

## 2023-01-01 RX ADMIN — MORPHINE SULFATE 30 MG: 15 TABLET ORAL at 21:26

## 2023-01-01 RX ADMIN — METHOCARBAMOL 750 MG: 750 TABLET ORAL at 01:07

## 2023-01-01 RX ADMIN — HYDROMORPHONE HYDROCHLORIDE 1 MG: 1 INJECTION, SOLUTION INTRAMUSCULAR; INTRAVENOUS; SUBCUTANEOUS at 01:24

## 2023-01-01 RX ADMIN — SUGAMMADEX 200 MG: 100 INJECTION, SOLUTION INTRAVENOUS at 17:10

## 2023-01-01 RX ADMIN — IPRATROPIUM BROMIDE AND ALBUTEROL SULFATE 3 ML: .5; 2.5 SOLUTION RESPIRATORY (INHALATION) at 07:47

## 2023-01-01 RX ADMIN — THIAMINE HCL TAB 100 MG 100 MG: 100 TAB at 04:34

## 2023-01-01 RX ADMIN — HYDROMORPHONE HYDROCHLORIDE 1 MG: 1 INJECTION, SOLUTION INTRAMUSCULAR; INTRAVENOUS; SUBCUTANEOUS at 17:11

## 2023-01-01 RX ADMIN — GABAPENTIN 300 MG: 300 CAPSULE ORAL at 11:46

## 2023-01-01 RX ADMIN — MAGNESIUM HYDROXIDE 30 ML: 1200 LIQUID ORAL at 20:38

## 2023-01-01 RX ADMIN — OMEPRAZOLE 20 MG: 20 CAPSULE, DELAYED RELEASE ORAL at 08:30

## 2023-01-01 RX ADMIN — PIPERACILLIN AND TAZOBACTAM 3.38 G: 3; .375 INJECTION, POWDER, FOR SOLUTION INTRAVENOUS at 03:32

## 2023-01-01 RX ADMIN — CEFEPIME 2 G: 2 INJECTION, POWDER, FOR SOLUTION INTRAVENOUS at 05:48

## 2023-01-01 RX ADMIN — DULOXETINE HYDROCHLORIDE 60 MG: 30 CAPSULE, DELAYED RELEASE ORAL at 08:30

## 2023-01-01 RX ADMIN — CEFAZOLIN 2 G: 2 INJECTION, POWDER, FOR SOLUTION INTRAMUSCULAR; INTRAVENOUS at 22:25

## 2023-01-01 RX ADMIN — SENNOSIDES AND DOCUSATE SODIUM 2 TABLET: 50; 8.6 TABLET ORAL at 08:54

## 2023-01-01 RX ADMIN — HYDROMORPHONE HYDROCHLORIDE 0.5 MG: 1 INJECTION, SOLUTION INTRAMUSCULAR; INTRAVENOUS; SUBCUTANEOUS at 10:24

## 2023-01-01 RX ADMIN — Medication 950 MG: at 08:40

## 2023-01-01 RX ADMIN — ACETAMINOPHEN 1000 MG: 500 TABLET ORAL at 20:41

## 2023-01-01 RX ADMIN — HYDROMORPHONE HYDROCHLORIDE 0.5 MG: 1 INJECTION, SOLUTION INTRAMUSCULAR; INTRAVENOUS; SUBCUTANEOUS at 06:15

## 2023-01-01 RX ADMIN — DAPAGLIFLOZIN 10 MG: 10 TABLET, FILM COATED ORAL at 05:30

## 2023-01-01 RX ADMIN — CEFTRIAXONE SODIUM 2000 MG: 10 INJECTION, POWDER, FOR SOLUTION INTRAVENOUS at 05:41

## 2023-01-01 RX ADMIN — MORPHINE SULFATE 30 MG: 15 TABLET ORAL at 11:59

## 2023-01-01 RX ADMIN — HYDROMORPHONE HYDROCHLORIDE 0.5 MG: 1 INJECTION, SOLUTION INTRAMUSCULAR; INTRAVENOUS; SUBCUTANEOUS at 12:36

## 2023-01-01 RX ADMIN — SENNOSIDES AND DOCUSATE SODIUM 2 TABLET: 50; 8.6 TABLET ORAL at 17:46

## 2023-01-01 RX ADMIN — MORPHINE SULFATE 30 MG: 15 TABLET ORAL at 15:11

## 2023-01-01 RX ADMIN — SALINE NASAL SPRAY 2 SPRAY: 1.5 SOLUTION NASAL at 20:18

## 2023-01-01 RX ADMIN — ACETAMINOPHEN 650 MG: 325 TABLET ORAL at 11:48

## 2023-01-01 RX ADMIN — PROPOFOL 200 MG: 10 INJECTION, EMULSION INTRAVENOUS at 16:21

## 2023-01-01 RX ADMIN — ACETAMINOPHEN 650 MG: 325 TABLET, FILM COATED ORAL at 11:06

## 2023-01-01 RX ADMIN — DOCUSATE SODIUM 50 MG AND SENNOSIDES 8.6 MG 2 TABLET: 8.6; 5 TABLET, FILM COATED ORAL at 17:51

## 2023-01-01 RX ADMIN — PREGABALIN 200 MG: 100 CAPSULE ORAL at 14:36

## 2023-01-01 RX ADMIN — MORPHINE SULFATE 15 MG: 15 TABLET, FILM COATED, EXTENDED RELEASE ORAL at 18:09

## 2023-01-01 RX ADMIN — MORPHINE SULFATE 30 MG: 15 TABLET ORAL at 12:06

## 2023-01-01 RX ADMIN — Medication 2 SPRAY: at 08:25

## 2023-01-01 RX ADMIN — DAPAGLIFLOZIN 10 MG: 10 TABLET, FILM COATED ORAL at 05:33

## 2023-01-01 RX ADMIN — HYDROMORPHONE HYDROCHLORIDE 1 MG: 1 INJECTION, SOLUTION INTRAMUSCULAR; INTRAVENOUS; SUBCUTANEOUS at 15:32

## 2023-01-01 RX ADMIN — CEFAZOLIN 2 G: 2 INJECTION, POWDER, FOR SOLUTION INTRAMUSCULAR; INTRAVENOUS at 21:37

## 2023-01-01 RX ADMIN — MORPHINE SULFATE 30 MG: 15 TABLET ORAL at 22:26

## 2023-01-01 RX ADMIN — CEFEPIME 2 G: 2 INJECTION, POWDER, FOR SOLUTION INTRAVENOUS at 05:26

## 2023-01-01 RX ADMIN — PREGABALIN 150 MG: 150 CAPSULE ORAL at 15:59

## 2023-01-01 RX ADMIN — OXYCODONE HYDROCHLORIDE 15 MG: 15 TABLET ORAL at 02:22

## 2023-01-01 RX ADMIN — PREGABALIN 100 MG: 100 CAPSULE ORAL at 04:46

## 2023-01-01 RX ADMIN — MAGNESIUM HYDROXIDE 30 ML: 1200 LIQUID ORAL at 06:08

## 2023-01-01 RX ADMIN — HEPARIN SODIUM 5000 UNITS: 5000 INJECTION, SOLUTION INTRAVENOUS; SUBCUTANEOUS at 21:26

## 2023-01-01 RX ADMIN — MORPHINE SULFATE 30 MG: 15 TABLET ORAL at 11:12

## 2023-01-01 RX ADMIN — OMEPRAZOLE 40 MG: 20 CAPSULE, DELAYED RELEASE ORAL at 08:28

## 2023-01-01 RX ADMIN — MORPHINE SULFATE 30 MG: 15 TABLET ORAL at 07:56

## 2023-01-01 RX ADMIN — PREGABALIN 150 MG: 150 CAPSULE ORAL at 21:22

## 2023-01-01 RX ADMIN — DAPAGLIFLOZIN 10 MG: 10 TABLET, FILM COATED ORAL at 05:10

## 2023-01-01 RX ADMIN — OMEPRAZOLE 40 MG: 20 CAPSULE, DELAYED RELEASE ORAL at 05:49

## 2023-01-01 RX ADMIN — ACETAMINOPHEN 1000 MG: 500 TABLET ORAL at 20:44

## 2023-01-01 RX ADMIN — DOCUSATE SODIUM 50 MG AND SENNOSIDES 8.6 MG 2 TABLET: 8.6; 5 TABLET, FILM COATED ORAL at 06:26

## 2023-01-01 RX ADMIN — METHOCARBAMOL TABLETS 750 MG: 750 TABLET, COATED ORAL at 16:12

## 2023-01-01 RX ADMIN — SODIUM CHLORIDE 250 MG: 9 INJECTION, SOLUTION INTRAVENOUS at 10:30

## 2023-01-01 RX ADMIN — PREGABALIN 100 MG: 100 CAPSULE ORAL at 17:16

## 2023-01-01 RX ADMIN — MORPHINE SULFATE 30 MG: 30 TABLET, FILM COATED, EXTENDED RELEASE ORAL at 05:32

## 2023-01-01 RX ADMIN — PREGABALIN 200 MG: 100 CAPSULE ORAL at 21:31

## 2023-01-01 RX ADMIN — HEPARIN SODIUM 5000 UNITS: 5000 INJECTION, SOLUTION INTRAVENOUS; SUBCUTANEOUS at 23:35

## 2023-01-01 RX ADMIN — THIAMINE HCL TAB 100 MG 100 MG: 100 TAB at 07:50

## 2023-01-01 RX ADMIN — PREGABALIN 200 MG: 100 CAPSULE ORAL at 08:54

## 2023-01-01 RX ADMIN — OXYCODONE HYDROCHLORIDE 10 MG: 10 TABLET ORAL at 08:53

## 2023-01-01 RX ADMIN — LINEZOLID 600 MG: 600 TABLET, FILM COATED ORAL at 05:30

## 2023-01-01 RX ADMIN — DAPAGLIFLOZIN 10 MG: 10 TABLET, FILM COATED ORAL at 08:41

## 2023-01-01 RX ADMIN — APIXABAN 5 MG: 5 TABLET, FILM COATED ORAL at 08:55

## 2023-01-01 RX ADMIN — HYDROMORPHONE HYDROCHLORIDE 0.5 MG: 1 INJECTION, SOLUTION INTRAMUSCULAR; INTRAVENOUS; SUBCUTANEOUS at 20:25

## 2023-01-01 RX ADMIN — DAPAGLIFLOZIN 10 MG: 10 TABLET, FILM COATED ORAL at 05:13

## 2023-01-01 RX ADMIN — MORPHINE SULFATE 30 MG: 15 TABLET ORAL at 11:28

## 2023-01-01 RX ADMIN — THERA TABS 1 TABLET: TAB at 08:38

## 2023-01-01 RX ADMIN — DOCUSATE SODIUM 50 MG AND SENNOSIDES 8.6 MG 2 TABLET: 8.6; 5 TABLET, FILM COATED ORAL at 16:46

## 2023-01-01 RX ADMIN — PREGABALIN 150 MG: 150 CAPSULE ORAL at 17:38

## 2023-01-01 RX ADMIN — Medication 1 APPLICATOR: at 17:48

## 2023-01-01 RX ADMIN — OMEPRAZOLE 40 MG: 20 CAPSULE, DELAYED RELEASE ORAL at 06:44

## 2023-01-01 RX ADMIN — FUROSEMIDE 40 MG: 10 INJECTION, SOLUTION INTRAVENOUS at 05:27

## 2023-01-01 RX ADMIN — HYDROMORPHONE HYDROCHLORIDE 0.5 MG: 1 INJECTION, SOLUTION INTRAMUSCULAR; INTRAVENOUS; SUBCUTANEOUS at 12:08

## 2023-01-01 RX ADMIN — MORPHINE SULFATE 15 MG: 15 TABLET, FILM COATED, EXTENDED RELEASE ORAL at 17:13

## 2023-01-01 RX ADMIN — NICOTINE 7 MG: 7 PATCH TRANSDERMAL at 05:09

## 2023-01-01 RX ADMIN — MORPHINE SULFATE 30 MG: 15 TABLET ORAL at 21:18

## 2023-01-01 RX ADMIN — Medication 950 MG: at 08:38

## 2023-01-01 RX ADMIN — LINEZOLID 600 MG: 600 TABLET, FILM COATED ORAL at 17:13

## 2023-01-01 RX ADMIN — MORPHINE SULFATE 30 MG: 30 TABLET, FILM COATED, EXTENDED RELEASE ORAL at 08:33

## 2023-01-01 RX ADMIN — DEXTROSE MONOHYDRATE 50 ML: 100 INJECTION, SOLUTION INTRAVENOUS at 17:28

## 2023-01-01 RX ADMIN — PREGABALIN 150 MG: 150 CAPSULE ORAL at 08:56

## 2023-01-01 RX ADMIN — MORPHINE SULFATE 30 MG: 30 TABLET, FILM COATED, EXTENDED RELEASE ORAL at 08:29

## 2023-01-01 RX ADMIN — OMEPRAZOLE 40 MG: 20 CAPSULE, DELAYED RELEASE ORAL at 08:32

## 2023-01-01 RX ADMIN — MORPHINE SULFATE 30 MG: 15 TABLET ORAL at 02:28

## 2023-01-01 RX ADMIN — MORPHINE SULFATE 30 MG: 15 TABLET ORAL at 11:32

## 2023-01-01 RX ADMIN — CALCIUM GLUCONATE 1 G: 20 INJECTION, SOLUTION INTRAVENOUS at 20:16

## 2023-01-01 RX ADMIN — PREGABALIN 200 MG: 100 CAPSULE ORAL at 08:45

## 2023-01-01 RX ADMIN — MORPHINE SULFATE 30 MG: 15 TABLET ORAL at 08:33

## 2023-01-01 RX ADMIN — DOCUSATE SODIUM 50 MG AND SENNOSIDES 8.6 MG 2 TABLET: 8.6; 5 TABLET, FILM COATED ORAL at 18:06

## 2023-01-01 RX ADMIN — MORPHINE SULFATE 30 MG: 15 TABLET ORAL at 05:32

## 2023-01-01 RX ADMIN — PROPOFOL 120 MG: 10 INJECTION, EMULSION INTRAVENOUS at 14:35

## 2023-01-01 RX ADMIN — MORPHINE SULFATE 30 MG: 15 TABLET ORAL at 05:28

## 2023-01-01 RX ADMIN — NICOTINE 7 MG: 7 PATCH TRANSDERMAL at 05:26

## 2023-01-01 RX ADMIN — VANCOMYCIN HYDROCHLORIDE 1500 MG: 5 INJECTION, POWDER, LYOPHILIZED, FOR SOLUTION INTRAVENOUS at 13:06

## 2023-01-01 RX ADMIN — MORPHINE SULFATE 30 MG: 30 TABLET, FILM COATED, EXTENDED RELEASE ORAL at 18:14

## 2023-01-01 RX ADMIN — OMEPRAZOLE 20 MG: 20 CAPSULE, DELAYED RELEASE ORAL at 20:25

## 2023-01-01 RX ADMIN — DIBASIC SODIUM PHOSPHATE, MONOBASIC POTASSIUM PHOSPHATE AND MONOBASIC SODIUM PHOSPHATE 500 MG: 852; 155; 130 TABLET ORAL at 17:15

## 2023-01-01 RX ADMIN — ACETAMINOPHEN 650 MG: 325 TABLET ORAL at 17:34

## 2023-01-01 RX ADMIN — OMEPRAZOLE 20 MG: 20 CAPSULE, DELAYED RELEASE ORAL at 20:47

## 2023-01-01 RX ADMIN — HEPARIN SODIUM 5000 UNITS: 5000 INJECTION, SOLUTION INTRAVENOUS; SUBCUTANEOUS at 21:34

## 2023-01-01 RX ADMIN — MORPHINE SULFATE 30 MG: 15 TABLET ORAL at 17:00

## 2023-01-01 RX ADMIN — DULOXETINE HYDROCHLORIDE 60 MG: 30 CAPSULE, DELAYED RELEASE ORAL at 08:40

## 2023-01-01 RX ADMIN — HYDROCORTISONE SODIUM SUCCINATE 50 MG: 100 INJECTION, POWDER, FOR SOLUTION INTRAMUSCULAR; INTRAVENOUS at 21:30

## 2023-01-01 RX ADMIN — DULOXETINE HYDROCHLORIDE 60 MG: 30 CAPSULE, DELAYED RELEASE ORAL at 09:06

## 2023-01-01 RX ADMIN — HYDROMORPHONE HYDROCHLORIDE 1 MG: 1 INJECTION, SOLUTION INTRAMUSCULAR; INTRAVENOUS; SUBCUTANEOUS at 08:54

## 2023-01-01 RX ADMIN — FUROSEMIDE 40 MG: 10 INJECTION, SOLUTION INTRAVENOUS at 17:11

## 2023-01-01 RX ADMIN — CEFAZOLIN 2 G: 2 INJECTION, POWDER, FOR SOLUTION INTRAMUSCULAR; INTRAVENOUS at 14:12

## 2023-01-01 RX ADMIN — FENTANYL CITRATE 100 MCG: 50 INJECTION, SOLUTION INTRAMUSCULAR; INTRAVENOUS at 09:47

## 2023-01-01 RX ADMIN — FUROSEMIDE 20 MG: 20 TABLET ORAL at 17:32

## 2023-01-01 RX ADMIN — ENOXAPARIN SODIUM 40 MG: 100 INJECTION SUBCUTANEOUS at 17:01

## 2023-01-01 RX ADMIN — Medication 200 MCG: at 16:29

## 2023-01-01 RX ADMIN — GABAPENTIN 300 MG: 300 CAPSULE ORAL at 21:31

## 2023-01-01 RX ADMIN — DULOXETINE HYDROCHLORIDE 60 MG: 30 CAPSULE, DELAYED RELEASE ORAL at 08:18

## 2023-01-01 RX ADMIN — CEFAZOLIN 2 G: 2 INJECTION, POWDER, FOR SOLUTION INTRAMUSCULAR; INTRAVENOUS at 20:57

## 2023-01-01 RX ADMIN — Medication 1 APPLICATOR: at 06:00

## 2023-01-01 RX ADMIN — TORSEMIDE 20 MG: 20 TABLET ORAL at 05:14

## 2023-01-01 RX ADMIN — OXYCODONE HYDROCHLORIDE 10 MG: 10 TABLET ORAL at 18:37

## 2023-01-01 RX ADMIN — FERROUS SULFATE TAB 325 MG (65 MG ELEMENTAL FE) 325 MG: 325 (65 FE) TAB at 08:25

## 2023-01-01 RX ADMIN — PROPOFOL 50 MG: 10 INJECTION, EMULSION INTRAVENOUS at 15:30

## 2023-01-01 RX ADMIN — MORPHINE SULFATE 30 MG: 15 TABLET ORAL at 09:00

## 2023-01-01 RX ADMIN — MORPHINE SULFATE 30 MG: 15 TABLET ORAL at 15:23

## 2023-01-01 RX ADMIN — SODIUM HYPOCHLORITE 1 ML: 1.25 SOLUTION TOPICAL at 06:00

## 2023-01-01 RX ADMIN — MORPHINE SULFATE 30 MG: 15 TABLET ORAL at 17:29

## 2023-01-01 RX ADMIN — MORPHINE SULFATE 30 MG: 30 TABLET, FILM COATED, EXTENDED RELEASE ORAL at 18:27

## 2023-01-01 RX ADMIN — CEFAZOLIN 2 G: 2 INJECTION, POWDER, FOR SOLUTION INTRAMUSCULAR; INTRAVENOUS at 21:48

## 2023-01-01 RX ADMIN — BISACODYL 10 MG: 10 SUPPOSITORY RECTAL at 17:18

## 2023-01-01 RX ADMIN — SODIUM CHLORIDE, POTASSIUM CHLORIDE, SODIUM LACTATE AND CALCIUM CHLORIDE 1000 ML: 600; 310; 30; 20 INJECTION, SOLUTION INTRAVENOUS at 11:51

## 2023-01-01 RX ADMIN — SODIUM CHLORIDE 2 G: 1 TABLET ORAL at 18:22

## 2023-01-01 RX ADMIN — HYDROMORPHONE HYDROCHLORIDE 0.5 MG: 1 INJECTION, SOLUTION INTRAMUSCULAR; INTRAVENOUS; SUBCUTANEOUS at 15:13

## 2023-01-01 RX ADMIN — MORPHINE SULFATE 15 MG: 15 TABLET, FILM COATED, EXTENDED RELEASE ORAL at 08:41

## 2023-01-01 RX ADMIN — OMEPRAZOLE 40 MG: 20 CAPSULE, DELAYED RELEASE ORAL at 05:32

## 2023-01-01 RX ADMIN — MAGNESIUM HYDROXIDE 30 ML: 1200 LIQUID ORAL at 05:38

## 2023-01-01 RX ADMIN — SODIUM CHLORIDE 2 G: 1 TABLET ORAL at 12:09

## 2023-01-01 RX ADMIN — MORPHINE SULFATE 30 MG: 15 TABLET ORAL at 17:59

## 2023-01-01 RX ADMIN — SENNOSIDES AND DOCUSATE SODIUM 2 TABLET: 50; 8.6 TABLET ORAL at 20:55

## 2023-01-01 RX ADMIN — OMEPRAZOLE 20 MG: 20 CAPSULE, DELAYED RELEASE ORAL at 05:17

## 2023-01-01 RX ADMIN — PREGABALIN 150 MG: 150 CAPSULE ORAL at 15:53

## 2023-01-01 RX ADMIN — CEFEPIME 2 G: 2 INJECTION, POWDER, FOR SOLUTION INTRAVENOUS at 15:16

## 2023-01-01 RX ADMIN — OXYCODONE HYDROCHLORIDE 10 MG: 10 TABLET, FILM COATED, EXTENDED RELEASE ORAL at 05:38

## 2023-01-01 RX ADMIN — CEFAZOLIN 2 G: 1 INJECTION, POWDER, FOR SOLUTION INTRAMUSCULAR; INTRAVENOUS at 14:35

## 2023-01-01 RX ADMIN — OMEPRAZOLE 20 MG: 20 CAPSULE, DELAYED RELEASE ORAL at 06:22

## 2023-01-01 RX ADMIN — ALBUTEROL SULFATE 2.5 MG: 2.5 SOLUTION RESPIRATORY (INHALATION) at 17:03

## 2023-01-01 RX ADMIN — OXYCODONE HYDROCHLORIDE 15 MG: 15 TABLET ORAL at 19:32

## 2023-01-01 RX ADMIN — HEPARIN SODIUM 22 UNITS/KG/HR: 5000 INJECTION, SOLUTION INTRAVENOUS at 06:17

## 2023-01-01 RX ADMIN — PREDNISONE 10 MG: 5 TABLET ORAL at 20:25

## 2023-01-01 RX ADMIN — MICONAZOLE NITRATE: 20 CREAM TOPICAL at 09:56

## 2023-01-01 RX ADMIN — SODIUM CHLORIDE, PRESERVATIVE FREE 20 ML: 5 INJECTION INTRAVENOUS at 09:44

## 2023-01-01 RX ADMIN — HYDROMORPHONE HYDROCHLORIDE 0.5 MG: 1 INJECTION, SOLUTION INTRAMUSCULAR; INTRAVENOUS; SUBCUTANEOUS at 20:31

## 2023-01-01 RX ADMIN — MORPHINE SULFATE 30 MG: 15 TABLET ORAL at 17:56

## 2023-01-01 RX ADMIN — HYDROMORPHONE HYDROCHLORIDE 1 MG: 2 INJECTION INTRAMUSCULAR; INTRAVENOUS; SUBCUTANEOUS at 08:57

## 2023-01-01 RX ADMIN — MIDODRINE HYDROCHLORIDE 10 MG: 5 TABLET ORAL at 17:56

## 2023-01-01 RX ADMIN — SENNOSIDES AND DOCUSATE SODIUM 2 TABLET: 50; 8.6 TABLET ORAL at 17:24

## 2023-01-01 RX ADMIN — MORPHINE SULFATE 30 MG: 15 TABLET ORAL at 09:02

## 2023-01-01 RX ADMIN — ENOXAPARIN SODIUM 40 MG: 100 INJECTION SUBCUTANEOUS at 17:16

## 2023-01-01 RX ADMIN — NICOTINE 7 MG: 7 PATCH, EXTENDED RELEASE TRANSDERMAL at 05:21

## 2023-01-01 RX ADMIN — PREGABALIN 200 MG: 100 CAPSULE ORAL at 20:42

## 2023-01-01 RX ADMIN — SENNOSIDES AND DOCUSATE SODIUM 2 TABLET: 50; 8.6 TABLET ORAL at 20:45

## 2023-01-01 RX ADMIN — MORPHINE SULFATE 30 MG: 15 TABLET ORAL at 10:39

## 2023-01-01 RX ADMIN — OXYCODONE HYDROCHLORIDE 10 MG: 10 TABLET ORAL at 08:24

## 2023-01-01 RX ADMIN — MICONAZOLE NITRATE: 20 CREAM TOPICAL at 21:30

## 2023-01-01 RX ADMIN — MORPHINE SULFATE 30 MG: 30 TABLET, FILM COATED, EXTENDED RELEASE ORAL at 19:58

## 2023-01-01 RX ADMIN — CEFEPIME 2 G: 2 INJECTION, POWDER, FOR SOLUTION INTRAVENOUS at 05:30

## 2023-01-01 RX ADMIN — HYDROMORPHONE HYDROCHLORIDE 4 MG: 2 INJECTION, SOLUTION INTRAMUSCULAR; INTRAVENOUS; SUBCUTANEOUS at 20:46

## 2023-01-01 RX ADMIN — HYDROMORPHONE HYDROCHLORIDE 0.5 MG: 1 INJECTION, SOLUTION INTRAMUSCULAR; INTRAVENOUS; SUBCUTANEOUS at 14:46

## 2023-01-01 RX ADMIN — Medication 1000 UNITS: at 08:35

## 2023-01-01 RX ADMIN — MORPHINE SULFATE 30 MG: 30 TABLET, FILM COATED, EXTENDED RELEASE ORAL at 12:11

## 2023-01-01 RX ADMIN — CEFEPIME 2 G: 2 INJECTION, POWDER, FOR SOLUTION INTRAVENOUS at 12:55

## 2023-01-01 RX ADMIN — MORPHINE SULFATE 30 MG: 15 TABLET ORAL at 14:20

## 2023-01-01 RX ADMIN — HYDROMORPHONE HYDROCHLORIDE 0.5 MG: 1 INJECTION, SOLUTION INTRAMUSCULAR; INTRAVENOUS; SUBCUTANEOUS at 03:37

## 2023-01-01 RX ADMIN — MORPHINE SULFATE 30 MG: 15 TABLET ORAL at 09:14

## 2023-01-01 RX ADMIN — ACETAMINOPHEN 650 MG: 325 TABLET ORAL at 18:07

## 2023-01-01 RX ADMIN — METHOCARBAMOL 750 MG: 750 TABLET ORAL at 20:43

## 2023-01-01 RX ADMIN — GABAPENTIN 300 MG: 300 CAPSULE ORAL at 12:21

## 2023-01-01 RX ADMIN — SENNOSIDES AND DOCUSATE SODIUM 2 TABLET: 50; 8.6 TABLET ORAL at 08:37

## 2023-01-01 RX ADMIN — CEFAZOLIN 2 G: 2 INJECTION, POWDER, FOR SOLUTION INTRAMUSCULAR; INTRAVENOUS at 06:42

## 2023-01-01 RX ADMIN — FLUCONAZOLE 200 MG: 100 TABLET ORAL at 08:26

## 2023-01-01 RX ADMIN — HYDROMORPHONE HYDROCHLORIDE 1 MG: 1 INJECTION, SOLUTION INTRAMUSCULAR; INTRAVENOUS; SUBCUTANEOUS at 06:56

## 2023-01-01 RX ADMIN — MORPHINE SULFATE 15 MG: 15 TABLET, FILM COATED, EXTENDED RELEASE ORAL at 08:59

## 2023-01-01 RX ADMIN — SODIUM CHLORIDE, SODIUM LACTATE, POTASSIUM CHLORIDE, CALCIUM CHLORIDE AND DEXTROSE MONOHYDRATE: 5; 600; 310; 30; 20 INJECTION, SOLUTION INTRAVENOUS at 08:13

## 2023-01-01 RX ADMIN — MORPHINE SULFATE 30 MG: 15 TABLET ORAL at 20:50

## 2023-01-01 RX ADMIN — OXYCODONE HYDROCHLORIDE 10 MG: 10 TABLET ORAL at 14:49

## 2023-01-01 RX ADMIN — Medication 2 SPRAY: at 11:43

## 2023-01-01 RX ADMIN — OMEPRAZOLE 20 MG: 20 CAPSULE, DELAYED RELEASE ORAL at 09:37

## 2023-01-01 RX ADMIN — CEFEPIME 2 G: 2 INJECTION, POWDER, FOR SOLUTION INTRAVENOUS at 21:24

## 2023-01-01 RX ADMIN — MORPHINE SULFATE 30 MG: 30 TABLET, FILM COATED, EXTENDED RELEASE ORAL at 18:49

## 2023-01-01 RX ADMIN — Medication 950 MG: at 08:44

## 2023-01-01 RX ADMIN — MORPHINE SULFATE 30 MG: 15 TABLET ORAL at 11:57

## 2023-01-01 RX ADMIN — MORPHINE SULFATE 15 MG: 15 TABLET, FILM COATED, EXTENDED RELEASE ORAL at 20:45

## 2023-01-01 RX ADMIN — HYDROCORTISONE SODIUM SUCCINATE 50 MG: 100 INJECTION, POWDER, FOR SOLUTION INTRAMUSCULAR; INTRAVENOUS at 11:47

## 2023-01-01 RX ADMIN — ACETAMINOPHEN 650 MG: 325 TABLET ORAL at 05:56

## 2023-01-01 RX ADMIN — MAGNESIUM HYDROXIDE 30 ML: 1200 LIQUID ORAL at 08:35

## 2023-01-01 RX ADMIN — MORPHINE SULFATE 30 MG: 15 TABLET ORAL at 17:20

## 2023-01-01 RX ADMIN — DOCUSATE SODIUM 50 MG AND SENNOSIDES 8.6 MG 2 TABLET: 8.6; 5 TABLET, FILM COATED ORAL at 17:03

## 2023-01-01 RX ADMIN — DOCUSATE SODIUM 50 MG AND SENNOSIDES 8.6 MG 2 TABLET: 8.6; 5 TABLET, FILM COATED ORAL at 05:17

## 2023-01-01 RX ADMIN — DULOXETINE HYDROCHLORIDE 60 MG: 30 CAPSULE, DELAYED RELEASE ORAL at 08:24

## 2023-01-01 RX ADMIN — MORPHINE SULFATE 30 MG: 15 TABLET ORAL at 17:15

## 2023-01-01 RX ADMIN — METHOCARBAMOL 750 MG: 750 TABLET ORAL at 11:19

## 2023-01-01 RX ADMIN — ACETAMINOPHEN 650 MG: 325 TABLET ORAL at 05:20

## 2023-01-01 RX ADMIN — DOBUTAMINE HYDROCHLORIDE 2.5 MCG/KG/MIN: 100 INJECTION INTRAVENOUS at 22:41

## 2023-01-01 RX ADMIN — POTASSIUM PHOSPHATE, MONOBASIC AND POTASSIUM PHOSPHATE, DIBASIC 30 MMOL: 224; 236 INJECTION, SOLUTION, CONCENTRATE INTRAVENOUS at 08:49

## 2023-01-01 RX ADMIN — MAGNESIUM HYDROXIDE 30 ML: 1200 LIQUID ORAL at 21:18

## 2023-01-01 RX ADMIN — MICONAZOLE NITRATE: 20 CREAM TOPICAL at 09:51

## 2023-01-01 RX ADMIN — GADOTERIDOL 20 ML: 279.3 INJECTION, SOLUTION INTRAVENOUS at 13:35

## 2023-01-01 RX ADMIN — HYDROMORPHONE HYDROCHLORIDE 0.5 MG: 1 INJECTION, SOLUTION INTRAMUSCULAR; INTRAVENOUS; SUBCUTANEOUS at 00:47

## 2023-01-01 RX ADMIN — ACETAMINOPHEN 650 MG: 325 TABLET ORAL at 11:12

## 2023-01-01 RX ADMIN — HEPARIN SODIUM 5000 UNITS: 5000 INJECTION, SOLUTION INTRAVENOUS; SUBCUTANEOUS at 05:24

## 2023-01-01 RX ADMIN — HYDROMORPHONE HYDROCHLORIDE 1 MG: 1 INJECTION, SOLUTION INTRAMUSCULAR; INTRAVENOUS; SUBCUTANEOUS at 13:23

## 2023-01-01 RX ADMIN — FENTANYL CITRATE 50 MCG: 50 INJECTION, SOLUTION INTRAMUSCULAR; INTRAVENOUS at 09:46

## 2023-01-01 RX ADMIN — NICOTINE 7 MG: 7 PATCH, EXTENDED RELEASE TRANSDERMAL at 05:42

## 2023-01-01 RX ADMIN — OXYCODONE HYDROCHLORIDE 10 MG: 10 TABLET ORAL at 05:35

## 2023-01-01 RX ADMIN — MORPHINE SULFATE 30 MG: 15 TABLET ORAL at 21:40

## 2023-01-01 RX ADMIN — NICOTINE 7 MG: 7 PATCH, EXTENDED RELEASE TRANSDERMAL at 05:43

## 2023-01-01 RX ADMIN — DULOXETINE HYDROCHLORIDE 60 MG: 60 CAPSULE, DELAYED RELEASE ORAL at 05:14

## 2023-01-01 RX ADMIN — MORPHINE SULFATE 15 MG: 15 TABLET, FILM COATED, EXTENDED RELEASE ORAL at 05:27

## 2023-01-01 RX ADMIN — MORPHINE SULFATE 15 MG: 15 TABLET, FILM COATED, EXTENDED RELEASE ORAL at 06:23

## 2023-01-01 RX ADMIN — ZOLPIDEM TARTRATE 5 MG: 5 TABLET ORAL at 22:30

## 2023-01-01 RX ADMIN — DULOXETINE HYDROCHLORIDE 60 MG: 30 CAPSULE, DELAYED RELEASE ORAL at 05:51

## 2023-01-01 RX ADMIN — METHOCARBAMOL TABLETS 750 MG: 750 TABLET, COATED ORAL at 21:12

## 2023-01-01 RX ADMIN — SODIUM CHLORIDE: 9 INJECTION, SOLUTION INTRAVENOUS at 14:51

## 2023-01-01 RX ADMIN — FUROSEMIDE 80 MG: 10 INJECTION, SOLUTION INTRAVENOUS at 11:24

## 2023-01-01 RX ADMIN — BUPIVACAINE HYDROCHLORIDE AND EPINEPHRINE BITARTRATE 30 ML: 2.5; .0091 INJECTION, SOLUTION EPIDURAL; INFILTRATION; INTRACAUDAL; PERINEURAL at 14:28

## 2023-01-01 RX ADMIN — PREGABALIN 100 MG: 100 CAPSULE ORAL at 11:00

## 2023-01-01 RX ADMIN — HYDROMORPHONE HYDROCHLORIDE 0.5 MG: 1 INJECTION, SOLUTION INTRAMUSCULAR; INTRAVENOUS; SUBCUTANEOUS at 18:12

## 2023-01-01 RX ADMIN — PREGABALIN 150 MG: 150 CAPSULE ORAL at 14:40

## 2023-01-01 RX ADMIN — PREGABALIN 150 MG: 150 CAPSULE ORAL at 20:38

## 2023-01-01 RX ADMIN — DOCUSATE SODIUM 50 MG AND SENNOSIDES 8.6 MG 2 TABLET: 8.6; 5 TABLET, FILM COATED ORAL at 17:13

## 2023-01-01 RX ADMIN — DULOXETINE HYDROCHLORIDE 60 MG: 60 CAPSULE, DELAYED RELEASE ORAL at 05:38

## 2023-01-01 RX ADMIN — ACETAMINOPHEN 650 MG: 325 TABLET ORAL at 23:11

## 2023-01-01 RX ADMIN — THIAMINE HCL TAB 100 MG 100 MG: 100 TAB at 08:10

## 2023-01-01 RX ADMIN — SENNOSIDES AND DOCUSATE SODIUM 2 TABLET: 50; 8.6 TABLET ORAL at 15:23

## 2023-01-01 RX ADMIN — METHOCARBAMOL TABLETS 750 MG: 750 TABLET, COATED ORAL at 21:18

## 2023-01-01 RX ADMIN — MICONAZOLE NITRATE: 20 CREAM TOPICAL at 00:26

## 2023-01-01 RX ADMIN — LINEZOLID 600 MG: 600 INJECTION, SOLUTION INTRAVENOUS at 00:39

## 2023-01-01 RX ADMIN — LIDOCAINE HYDROCHLORIDE: 20 INJECTION, SOLUTION INFILTRATION; PERINEURAL at 16:48

## 2023-01-01 RX ADMIN — ACETAMINOPHEN 1000 MG: 500 TABLET, FILM COATED ORAL at 11:58

## 2023-01-01 RX ADMIN — PREGABALIN 150 MG: 150 CAPSULE ORAL at 16:52

## 2023-01-01 RX ADMIN — HEPARIN SODIUM 5000 UNITS: 5000 INJECTION, SOLUTION INTRAVENOUS; SUBCUTANEOUS at 05:45

## 2023-01-01 RX ADMIN — HEPARIN SODIUM 5000 UNITS: 5000 INJECTION, SOLUTION INTRAVENOUS; SUBCUTANEOUS at 05:30

## 2023-01-01 RX ADMIN — MAGNESIUM HYDROXIDE 30 ML: 400 SUSPENSION ORAL at 22:33

## 2023-01-01 RX ADMIN — SENNOSIDES AND DOCUSATE SODIUM 2 TABLET: 8.6; 5 TABLET ORAL at 08:21

## 2023-01-01 RX ADMIN — POTASSIUM CHLORIDE 20 MEQ: 14.9 INJECTION, SOLUTION INTRAVENOUS at 13:48

## 2023-01-01 RX ADMIN — MORPHINE SULFATE 30 MG: 15 TABLET ORAL at 05:46

## 2023-01-01 RX ADMIN — DAPAGLIFLOZIN 10 MG: 10 TABLET, FILM COATED ORAL at 09:36

## 2023-01-01 RX ADMIN — PREGABALIN 75 MG: 75 CAPSULE ORAL at 04:34

## 2023-01-01 RX ADMIN — MORPHINE SULFATE 30 MG: 15 TABLET ORAL at 13:53

## 2023-01-01 RX ADMIN — PREGABALIN 150 MG: 150 CAPSULE ORAL at 21:34

## 2023-01-01 RX ADMIN — MORPHINE SULFATE 30 MG: 15 TABLET ORAL at 12:55

## 2023-01-01 RX ADMIN — SIMETHICONE 125 MG: 125 TABLET, CHEWABLE ORAL at 21:21

## 2023-01-01 RX ADMIN — MORPHINE SULFATE 30 MG: 15 TABLET ORAL at 12:14

## 2023-01-01 RX ADMIN — CEFDINIR 300 MG: 300 CAPSULE ORAL at 14:05

## 2023-01-01 RX ADMIN — OMEPRAZOLE 40 MG: 20 CAPSULE, DELAYED RELEASE ORAL at 05:08

## 2023-01-01 RX ADMIN — OMEPRAZOLE 40 MG: 20 CAPSULE, DELAYED RELEASE ORAL at 08:54

## 2023-01-01 RX ADMIN — HYDROMORPHONE HYDROCHLORIDE 0.5 MG: 1 INJECTION, SOLUTION INTRAMUSCULAR; INTRAVENOUS; SUBCUTANEOUS at 14:05

## 2023-01-01 RX ADMIN — ALBUTEROL SULFATE 2 PUFF: 90 AEROSOL, METERED RESPIRATORY (INHALATION) at 08:10

## 2023-01-01 RX ADMIN — DOCUSATE SODIUM 10 MG: 50 LIQUID ORAL at 15:25

## 2023-01-01 RX ADMIN — MORPHINE SULFATE 15 MG: 15 TABLET, FILM COATED, EXTENDED RELEASE ORAL at 18:15

## 2023-01-01 RX ADMIN — DULOXETINE HYDROCHLORIDE 60 MG: 60 CAPSULE, DELAYED RELEASE ORAL at 13:37

## 2023-01-01 RX ADMIN — OMEPRAZOLE 20 MG: 20 CAPSULE, DELAYED RELEASE ORAL at 08:05

## 2023-01-01 RX ADMIN — DOCUSATE SODIUM 50 MG AND SENNOSIDES 8.6 MG 2 TABLET: 8.6; 5 TABLET, FILM COATED ORAL at 05:16

## 2023-01-01 RX ADMIN — SPIRONOLACTONE 25 MG: 25 TABLET ORAL at 04:47

## 2023-01-01 RX ADMIN — SENNOSIDES AND DOCUSATE SODIUM 2 TABLET: 50; 8.6 TABLET ORAL at 08:11

## 2023-01-01 RX ADMIN — MORPHINE SULFATE 15 MG: 15 TABLET, FILM COATED, EXTENDED RELEASE ORAL at 05:28

## 2023-01-01 RX ADMIN — AMIODARONE HYDROCHLORIDE 400 MG: 200 TABLET ORAL at 17:11

## 2023-01-01 RX ADMIN — OMEPRAZOLE 40 MG: 20 CAPSULE, DELAYED RELEASE ORAL at 08:25

## 2023-01-01 RX ADMIN — ACETAMINOPHEN 1000 MG: 500 TABLET ORAL at 14:44

## 2023-01-01 RX ADMIN — HEPARIN SODIUM 5000 UNITS: 5000 INJECTION, SOLUTION INTRAVENOUS; SUBCUTANEOUS at 21:15

## 2023-01-01 RX ADMIN — BISACODYL 10 MG: 10 SUPPOSITORY RECTAL at 09:52

## 2023-01-01 RX ADMIN — ACETAMINOPHEN 650 MG: 325 TABLET ORAL at 11:52

## 2023-01-01 RX ADMIN — HEPARIN SODIUM 5000 UNITS: 5000 INJECTION, SOLUTION INTRAVENOUS; SUBCUTANEOUS at 20:45

## 2023-01-01 RX ADMIN — THIAMINE HCL TAB 100 MG 100 MG: 100 TAB at 08:39

## 2023-01-01 RX ADMIN — SODIUM CHLORIDE, SODIUM LACTATE, POTASSIUM CHLORIDE, CALCIUM CHLORIDE AND DEXTROSE MONOHYDRATE: 5; 600; 310; 30; 20 INJECTION, SOLUTION INTRAVENOUS at 16:07

## 2023-01-01 RX ADMIN — DOBUTAMINE HYDROCHLORIDE 2.5 MCG/KG/MIN: 100 INJECTION INTRAVENOUS at 18:17

## 2023-01-01 RX ADMIN — DRONABINOL 2.5 MG: 2.5 CAPSULE ORAL at 17:46

## 2023-01-01 RX ADMIN — MORPHINE SULFATE 30 MG: 15 TABLET ORAL at 08:39

## 2023-01-01 RX ADMIN — OMEPRAZOLE 20 MG: 20 CAPSULE, DELAYED RELEASE ORAL at 05:36

## 2023-01-01 RX ADMIN — GABAPENTIN 300 MG: 300 CAPSULE ORAL at 16:14

## 2023-01-01 RX ADMIN — OMEPRAZOLE 20 MG: 20 CAPSULE, DELAYED RELEASE ORAL at 17:03

## 2023-01-01 RX ADMIN — MORPHINE SULFATE 30 MG: 30 TABLET, FILM COATED, EXTENDED RELEASE ORAL at 08:23

## 2023-01-01 RX ADMIN — HYDROMORPHONE HYDROCHLORIDE 1 MG: 1 INJECTION, SOLUTION INTRAMUSCULAR; INTRAVENOUS; SUBCUTANEOUS at 22:58

## 2023-01-01 RX ADMIN — MORPHINE SULFATE 30 MG: 15 TABLET ORAL at 08:28

## 2023-01-01 RX ADMIN — PREGABALIN 150 MG: 150 CAPSULE ORAL at 15:06

## 2023-01-01 RX ADMIN — PREGABALIN 150 MG: 150 CAPSULE ORAL at 08:19

## 2023-01-01 RX ADMIN — ENOXAPARIN SODIUM 40 MG: 100 INJECTION SUBCUTANEOUS at 17:07

## 2023-01-01 RX ADMIN — SIMETHICONE 125 MG: 125 TABLET, CHEWABLE ORAL at 08:46

## 2023-01-01 RX ADMIN — ACETAMINOPHEN 650 MG: 325 TABLET, FILM COATED ORAL at 05:14

## 2023-01-01 RX ADMIN — DULOXETINE HYDROCHLORIDE 60 MG: 30 CAPSULE, DELAYED RELEASE ORAL at 08:29

## 2023-01-01 RX ADMIN — MORPHINE SULFATE 30 MG: 15 TABLET ORAL at 06:00

## 2023-01-01 RX ADMIN — MAGNESIUM HYDROXIDE 30 ML: 400 SUSPENSION ORAL at 05:56

## 2023-01-01 RX ADMIN — FENTANYL CITRATE 50 MCG: 50 INJECTION, SOLUTION INTRAMUSCULAR; INTRAVENOUS at 10:53

## 2023-01-01 RX ADMIN — PREGABALIN 150 MG: 150 CAPSULE ORAL at 15:18

## 2023-01-01 RX ADMIN — MORPHINE SULFATE 30 MG: 15 TABLET ORAL at 03:07

## 2023-01-01 RX ADMIN — Medication 1000 UNITS: at 08:26

## 2023-01-01 RX ADMIN — NICOTINE 7 MG: 7 PATCH, EXTENDED RELEASE TRANSDERMAL at 05:52

## 2023-01-01 RX ADMIN — HYDROMORPHONE HYDROCHLORIDE 0.5 MG: 1 INJECTION, SOLUTION INTRAMUSCULAR; INTRAVENOUS; SUBCUTANEOUS at 16:30

## 2023-01-01 RX ADMIN — CARBOXYMETHYLCELLULOSE SODIUM 1 DROP: 5 SOLUTION/ DROPS OPHTHALMIC at 23:49

## 2023-01-01 RX ADMIN — CEFTRIAXONE SODIUM 2000 MG: 2 INJECTION, POWDER, FOR SOLUTION INTRAMUSCULAR; INTRAVENOUS at 21:17

## 2023-01-01 RX ADMIN — MORPHINE SULFATE 15 MG: 15 TABLET, FILM COATED, EXTENDED RELEASE ORAL at 05:13

## 2023-01-01 RX ADMIN — HYDROCORTISONE: 1 CREAM TOPICAL at 16:15

## 2023-01-01 RX ADMIN — OXYCODONE HYDROCHLORIDE 10 MG: 10 TABLET ORAL at 13:24

## 2023-01-01 RX ADMIN — Medication 1000 UNITS: at 09:09

## 2023-01-01 RX ADMIN — POTASSIUM PHOSPHATE, MONOBASIC AND POTASSIUM PHOSPHATE, DIBASIC 30 MMOL: 224; 236 INJECTION, SOLUTION, CONCENTRATE INTRAVENOUS at 09:38

## 2023-01-01 RX ADMIN — HYDROCORTISONE: 1 CREAM TOPICAL at 20:41

## 2023-01-01 RX ADMIN — DAPAGLIFLOZIN 10 MG: 10 TABLET, FILM COATED ORAL at 09:07

## 2023-01-01 RX ADMIN — ZOLPIDEM TARTRATE 5 MG: 5 TABLET ORAL at 22:47

## 2023-01-01 RX ADMIN — POTASSIUM CHLORIDE 40 MEQ: 1500 TABLET, EXTENDED RELEASE ORAL at 13:42

## 2023-01-01 RX ADMIN — MORPHINE SULFATE 30 MG: 15 TABLET ORAL at 01:28

## 2023-01-01 RX ADMIN — HYDROMORPHONE HYDROCHLORIDE 0.4 MG: 1 INJECTION, SOLUTION INTRAMUSCULAR; INTRAVENOUS; SUBCUTANEOUS at 17:26

## 2023-01-01 RX ADMIN — MORPHINE SULFATE 30 MG: 30 TABLET, FILM COATED, EXTENDED RELEASE ORAL at 20:35

## 2023-01-01 RX ADMIN — DULOXETINE HYDROCHLORIDE 60 MG: 60 CAPSULE, DELAYED RELEASE ORAL at 05:18

## 2023-01-01 RX ADMIN — ACETAMINOPHEN 650 MG: 325 TABLET ORAL at 05:55

## 2023-01-01 RX ADMIN — OMEPRAZOLE 20 MG: 20 CAPSULE, DELAYED RELEASE ORAL at 22:46

## 2023-01-01 RX ADMIN — MICONAZOLE NITRATE: 20 CREAM TOPICAL at 05:14

## 2023-01-01 RX ADMIN — ERGOCALCIFEROL 50000 UNITS: 1.25 CAPSULE ORAL at 09:32

## 2023-01-01 RX ADMIN — SENNOSIDES AND DOCUSATE SODIUM 2 TABLET: 50; 8.6 TABLET ORAL at 08:56

## 2023-01-01 RX ADMIN — PROPOFOL 150 MG: 10 INJECTION, EMULSION INTRAVENOUS at 15:26

## 2023-01-01 RX ADMIN — HYDROMORPHONE HYDROCHLORIDE 1 MG: 1 INJECTION, SOLUTION INTRAMUSCULAR; INTRAVENOUS; SUBCUTANEOUS at 08:41

## 2023-01-01 RX ADMIN — Medication 950 MG: at 08:34

## 2023-01-01 RX ADMIN — MORPHINE SULFATE 30 MG: 15 TABLET ORAL at 14:58

## 2023-01-01 RX ADMIN — DOCUSATE SODIUM 10 MG: 50 LIQUID ORAL at 08:40

## 2023-01-01 RX ADMIN — SENNOSIDES AND DOCUSATE SODIUM 2 TABLET: 8.6; 5 TABLET ORAL at 08:44

## 2023-01-01 RX ADMIN — MORPHINE SULFATE 15 MG: 15 TABLET, FILM COATED, EXTENDED RELEASE ORAL at 08:21

## 2023-01-01 RX ADMIN — DOCUSATE SODIUM 50 MG AND SENNOSIDES 8.6 MG 2 TABLET: 8.6; 5 TABLET, FILM COATED ORAL at 17:56

## 2023-01-01 RX ADMIN — OMEPRAZOLE 20 MG: 20 CAPSULE, DELAYED RELEASE ORAL at 15:15

## 2023-01-01 RX ADMIN — SODIUM CHLORIDE, PRESERVATIVE FREE 20 ML: 5 INJECTION INTRAVENOUS at 09:00

## 2023-01-01 RX ADMIN — FUROSEMIDE 40 MG: 10 INJECTION, SOLUTION INTRAMUSCULAR; INTRAVENOUS at 05:18

## 2023-01-01 RX ADMIN — HYDROMORPHONE HYDROCHLORIDE 1 MG: 1 INJECTION, SOLUTION INTRAMUSCULAR; INTRAVENOUS; SUBCUTANEOUS at 13:44

## 2023-01-01 RX ADMIN — ACETAMINOPHEN 650 MG: 325 TABLET ORAL at 17:17

## 2023-01-01 RX ADMIN — SODIUM CHLORIDE, PRESERVATIVE FREE 20 ML: 5 INJECTION INTRAVENOUS at 08:59

## 2023-01-01 RX ADMIN — THERA TABS 1 TABLET: TAB at 08:32

## 2023-01-01 RX ADMIN — MAGNESIUM SULFATE HEPTAHYDRATE 2 G: 40 INJECTION, SOLUTION INTRAVENOUS at 08:14

## 2023-01-01 RX ADMIN — MORPHINE SULFATE 30 MG: 15 TABLET ORAL at 08:45

## 2023-01-01 RX ADMIN — DULOXETINE HYDROCHLORIDE 60 MG: 60 CAPSULE, DELAYED RELEASE ORAL at 06:11

## 2023-01-01 RX ADMIN — MIDAZOLAM HYDROCHLORIDE 1 MG: 1 INJECTION, SOLUTION INTRAMUSCULAR; INTRAVENOUS at 17:28

## 2023-01-01 RX ADMIN — ZOLPIDEM TARTRATE 10 MG: 5 TABLET ORAL at 23:20

## 2023-01-01 RX ADMIN — THIAMINE HCL TAB 100 MG 100 MG: 100 TAB at 08:56

## 2023-01-01 RX ADMIN — PREGABALIN 150 MG: 150 CAPSULE ORAL at 14:51

## 2023-01-01 RX ADMIN — SENNOSIDES AND DOCUSATE SODIUM 2 TABLET: 50; 8.6 TABLET ORAL at 20:04

## 2023-01-01 RX ADMIN — MICONAZOLE NITRATE: 20 CREAM TOPICAL at 08:10

## 2023-01-01 RX ADMIN — MORPHINE SULFATE 15 MG: 15 TABLET, FILM COATED, EXTENDED RELEASE ORAL at 17:57

## 2023-01-01 RX ADMIN — PREGABALIN 200 MG: 100 CAPSULE ORAL at 14:16

## 2023-01-01 RX ADMIN — ACETAMINOPHEN 650 MG: 325 TABLET ORAL at 23:52

## 2023-01-01 RX ADMIN — SODIUM BICARBONATE 42 ML/HR: 84 INJECTION, SOLUTION INTRAVENOUS at 05:44

## 2023-01-01 RX ADMIN — PREGABALIN 150 MG: 150 CAPSULE ORAL at 15:17

## 2023-01-01 RX ADMIN — PIPERACILLIN AND TAZOBACTAM 3.38 G: 3; .375 INJECTION, POWDER, FOR SOLUTION INTRAVENOUS at 20:42

## 2023-01-01 RX ADMIN — OMEPRAZOLE 40 MG: 20 CAPSULE, DELAYED RELEASE ORAL at 06:15

## 2023-01-01 RX ADMIN — DULOXETINE HYDROCHLORIDE 60 MG: 30 CAPSULE, DELAYED RELEASE ORAL at 08:14

## 2023-01-01 RX ADMIN — PIPERACILLIN AND TAZOBACTAM 3.38 G: 3; .375 INJECTION, POWDER, FOR SOLUTION INTRAVENOUS at 23:42

## 2023-01-01 RX ADMIN — HYDROMORPHONE HYDROCHLORIDE 1 MG: 2 INJECTION INTRAMUSCULAR; INTRAVENOUS; SUBCUTANEOUS at 17:01

## 2023-01-01 RX ADMIN — METOPROLOL TARTRATE 2.5 MG: 5 INJECTION INTRAVENOUS at 15:52

## 2023-01-01 RX ADMIN — ACETAMINOPHEN 1000 MG: 500 TABLET ORAL at 08:50

## 2023-01-01 RX ADMIN — AMIODARONE HYDROCHLORIDE 400 MG: 200 TABLET ORAL at 05:18

## 2023-01-01 RX ADMIN — DULOXETINE HYDROCHLORIDE 60 MG: 30 CAPSULE, DELAYED RELEASE ORAL at 08:34

## 2023-01-01 RX ADMIN — PREGABALIN 200 MG: 100 CAPSULE ORAL at 20:14

## 2023-01-01 RX ADMIN — PREGABALIN 75 MG: 75 CAPSULE ORAL at 13:01

## 2023-01-01 RX ADMIN — MORPHINE SULFATE 15 MG: 15 TABLET, FILM COATED, EXTENDED RELEASE ORAL at 21:55

## 2023-01-01 RX ADMIN — DIBASIC SODIUM PHOSPHATE, MONOBASIC POTASSIUM PHOSPHATE AND MONOBASIC SODIUM PHOSPHATE 500 MG: 852; 155; 130 TABLET ORAL at 12:29

## 2023-01-01 RX ADMIN — Medication 950 MG: at 06:16

## 2023-01-01 RX ADMIN — HYDROMORPHONE HYDROCHLORIDE 1 MG: 1 INJECTION, SOLUTION INTRAMUSCULAR; INTRAVENOUS; SUBCUTANEOUS at 05:20

## 2023-01-01 RX ADMIN — CEFAZOLIN 2 G: 2 INJECTION, POWDER, FOR SOLUTION INTRAMUSCULAR; INTRAVENOUS at 21:54

## 2023-01-01 RX ADMIN — MORPHINE SULFATE 30 MG: 15 TABLET ORAL at 15:18

## 2023-01-01 RX ADMIN — Medication 2000 UNITS: at 18:23

## 2023-01-01 RX ADMIN — HYDROMORPHONE HYDROCHLORIDE 0.5 MG: 1 INJECTION, SOLUTION INTRAMUSCULAR; INTRAVENOUS; SUBCUTANEOUS at 02:32

## 2023-01-01 RX ADMIN — DULOXETINE HYDROCHLORIDE 60 MG: 30 CAPSULE, DELAYED RELEASE ORAL at 08:49

## 2023-01-01 RX ADMIN — POTASSIUM CHLORIDE 10 MEQ: 1500 TABLET, EXTENDED RELEASE ORAL at 08:15

## 2023-01-01 RX ADMIN — HEPARIN SODIUM 5000 UNITS: 5000 INJECTION, SOLUTION INTRAVENOUS; SUBCUTANEOUS at 13:28

## 2023-01-01 RX ADMIN — ONDANSETRON 4 MG: 2 INJECTION INTRAMUSCULAR; INTRAVENOUS at 14:21

## 2023-01-01 RX ADMIN — BISACODYL 10 MG: 10 SUPPOSITORY RECTAL at 18:16

## 2023-01-01 RX ADMIN — Medication 950 MG: at 05:22

## 2023-01-01 RX ADMIN — DULOXETINE HYDROCHLORIDE 60 MG: 60 CAPSULE, DELAYED RELEASE ORAL at 05:52

## 2023-01-01 RX ADMIN — DOCUSATE SODIUM 10 MG: 50 LIQUID ORAL at 08:38

## 2023-01-01 RX ADMIN — MORPHINE SULFATE 30 MG: 15 TABLET ORAL at 12:10

## 2023-01-01 RX ADMIN — SENNOSIDES AND DOCUSATE SODIUM 2 TABLET: 50; 8.6 TABLET ORAL at 22:45

## 2023-01-01 RX ADMIN — PREGABALIN 200 MG: 100 CAPSULE ORAL at 09:13

## 2023-01-01 RX ADMIN — HYDROMORPHONE HYDROCHLORIDE 1 MG: 1 INJECTION, SOLUTION INTRAMUSCULAR; INTRAVENOUS; SUBCUTANEOUS at 19:55

## 2023-01-01 RX ADMIN — PREGABALIN 150 MG: 150 CAPSULE ORAL at 08:12

## 2023-01-01 RX ADMIN — BISACODYL 10 MG: 10 SUPPOSITORY RECTAL at 16:04

## 2023-01-01 RX ADMIN — MORPHINE SULFATE 30 MG: 15 TABLET ORAL at 13:06

## 2023-01-01 RX ADMIN — OXYCODONE HYDROCHLORIDE 10 MG: 5 SOLUTION ORAL at 16:20

## 2023-01-01 RX ADMIN — DOCUSATE SODIUM 50 MG AND SENNOSIDES 8.6 MG 2 TABLET: 8.6; 5 TABLET, FILM COATED ORAL at 05:14

## 2023-01-01 RX ADMIN — DOCUSATE SODIUM 50 MG AND SENNOSIDES 8.6 MG 2 TABLET: 8.6; 5 TABLET, FILM COATED ORAL at 17:14

## 2023-01-01 RX ADMIN — CEFAZOLIN 2 G: 2 INJECTION, POWDER, FOR SOLUTION INTRAMUSCULAR; INTRAVENOUS at 13:49

## 2023-01-01 RX ADMIN — OXYCODONE HYDROCHLORIDE 15 MG: 15 TABLET ORAL at 19:48

## 2023-01-01 RX ADMIN — SENNOSIDES AND DOCUSATE SODIUM 2 TABLET: 50; 8.6 TABLET ORAL at 17:05

## 2023-01-01 RX ADMIN — MORPHINE SULFATE 30 MG: 15 TABLET ORAL at 09:23

## 2023-01-01 RX ADMIN — Medication 1000 UNITS: at 08:21

## 2023-01-01 RX ADMIN — OMEPRAZOLE 40 MG: 20 CAPSULE, DELAYED RELEASE ORAL at 06:42

## 2023-01-01 RX ADMIN — DOCUSATE SODIUM 50 MG AND SENNOSIDES 8.6 MG 2 TABLET: 8.6; 5 TABLET, FILM COATED ORAL at 17:11

## 2023-01-01 RX ADMIN — METHOCARBAMOL TABLETS 750 MG: 750 TABLET, COATED ORAL at 15:25

## 2023-01-01 RX ADMIN — MORPHINE SULFATE 30 MG: 15 TABLET ORAL at 20:43

## 2023-01-01 RX ADMIN — BISACODYL 10 MG: 10 SUPPOSITORY RECTAL at 01:10

## 2023-01-01 RX ADMIN — OMEPRAZOLE 20 MG: 20 CAPSULE, DELAYED RELEASE ORAL at 08:14

## 2023-01-01 RX ADMIN — Medication 2 SPRAY: at 09:11

## 2023-01-01 RX ADMIN — DULOXETINE HYDROCHLORIDE 60 MG: 30 CAPSULE, DELAYED RELEASE ORAL at 08:33

## 2023-01-01 RX ADMIN — SPIRONOLACTONE 25 MG: 25 TABLET ORAL at 05:14

## 2023-01-01 RX ADMIN — CEFEPIME 2 G: 2 INJECTION, POWDER, FOR SOLUTION INTRAVENOUS at 14:16

## 2023-01-01 RX ADMIN — MORPHINE SULFATE 30 MG: 15 TABLET ORAL at 11:44

## 2023-01-01 RX ADMIN — POTASSIUM CHLORIDE 20 MEQ: 1500 TABLET, EXTENDED RELEASE ORAL at 06:31

## 2023-01-01 RX ADMIN — PREGABALIN 75 MG: 75 CAPSULE ORAL at 05:08

## 2023-01-01 RX ADMIN — PREGABALIN 200 MG: 100 CAPSULE ORAL at 14:48

## 2023-01-01 RX ADMIN — CEFAZOLIN 2 G: 2 INJECTION, POWDER, FOR SOLUTION INTRAMUSCULAR; INTRAVENOUS at 13:21

## 2023-01-01 RX ADMIN — POTASSIUM CHLORIDE 10 MEQ: 1500 TABLET, EXTENDED RELEASE ORAL at 08:28

## 2023-01-01 RX ADMIN — HYDROCORTISONE: 1 CREAM TOPICAL at 08:56

## 2023-01-01 RX ADMIN — ALBUTEROL SULFATE 2.5 MG: 2.5 SOLUTION RESPIRATORY (INHALATION) at 22:16

## 2023-01-01 RX ADMIN — THIAMINE HYDROCHLORIDE 100 MG: 100 INJECTION, SOLUTION INTRAMUSCULAR; INTRAVENOUS at 17:57

## 2023-01-01 RX ADMIN — HYDROMORPHONE HYDROCHLORIDE 0.5 MG: 1 INJECTION, SOLUTION INTRAMUSCULAR; INTRAVENOUS; SUBCUTANEOUS at 00:20

## 2023-01-01 RX ADMIN — ALBUTEROL SULFATE 2.5 MG: 2.5 SOLUTION RESPIRATORY (INHALATION) at 22:06

## 2023-01-01 RX ADMIN — MORPHINE SULFATE 30 MG: 15 TABLET ORAL at 21:35

## 2023-01-01 RX ADMIN — MORPHINE SULFATE 30 MG: 15 TABLET ORAL at 11:40

## 2023-01-01 RX ADMIN — DAPAGLIFLOZIN 10 MG: 10 TABLET, FILM COATED ORAL at 05:20

## 2023-01-01 RX ADMIN — HYDROMORPHONE HYDROCHLORIDE 1 MG: 1 INJECTION, SOLUTION INTRAMUSCULAR; INTRAVENOUS; SUBCUTANEOUS at 08:09

## 2023-01-01 RX ADMIN — MORPHINE SULFATE 30 MG: 30 TABLET, FILM COATED, EXTENDED RELEASE ORAL at 18:06

## 2023-01-01 RX ADMIN — MORPHINE SULFATE 15 MG: 15 TABLET, FILM COATED, EXTENDED RELEASE ORAL at 08:50

## 2023-01-01 RX ADMIN — ACETAMINOPHEN 1000 MG: 500 TABLET ORAL at 08:05

## 2023-01-01 RX ADMIN — POTASSIUM CHLORIDE 40 MEQ: 1500 TABLET, EXTENDED RELEASE ORAL at 13:52

## 2023-01-01 RX ADMIN — LINEZOLID 600 MG: 600 TABLET, FILM COATED ORAL at 22:06

## 2023-01-01 RX ADMIN — OMEPRAZOLE 20 MG: 20 CAPSULE, DELAYED RELEASE ORAL at 08:21

## 2023-01-01 RX ADMIN — OMEPRAZOLE 20 MG: 20 CAPSULE, DELAYED RELEASE ORAL at 21:15

## 2023-01-01 RX ADMIN — MORPHINE SULFATE 30 MG: 15 TABLET ORAL at 17:30

## 2023-01-01 RX ADMIN — HYDROMORPHONE HYDROCHLORIDE 1 MG: 1 INJECTION, SOLUTION INTRAMUSCULAR; INTRAVENOUS; SUBCUTANEOUS at 04:33

## 2023-01-01 RX ADMIN — HYDROMORPHONE HYDROCHLORIDE 1 MG: 1 INJECTION, SOLUTION INTRAMUSCULAR; INTRAVENOUS; SUBCUTANEOUS at 11:43

## 2023-01-01 RX ADMIN — SODIUM CHLORIDE, PRESERVATIVE FREE 20 ML: 5 INJECTION INTRAVENOUS at 08:31

## 2023-01-01 RX ADMIN — SENNOSIDES AND DOCUSATE SODIUM 2 TABLET: 50; 8.6 TABLET ORAL at 05:26

## 2023-01-01 RX ADMIN — HEPARIN SODIUM 5000 UNITS: 5000 INJECTION, SOLUTION INTRAVENOUS; SUBCUTANEOUS at 17:40

## 2023-01-01 RX ADMIN — MORPHINE SULFATE 15 MG: 15 TABLET, FILM COATED, EXTENDED RELEASE ORAL at 08:04

## 2023-01-01 RX ADMIN — PREGABALIN 200 MG: 100 CAPSULE ORAL at 14:21

## 2023-01-01 RX ADMIN — MORPHINE SULFATE 15 MG: 15 TABLET, FILM COATED, EXTENDED RELEASE ORAL at 21:16

## 2023-01-01 RX ADMIN — ZOLPIDEM TARTRATE 10 MG: 5 TABLET ORAL at 00:01

## 2023-01-01 RX ADMIN — CARBOXYMETHYLCELLULOSE SODIUM 1 DROP: 5 SOLUTION/ DROPS OPHTHALMIC at 09:11

## 2023-01-01 RX ADMIN — FUROSEMIDE 20 MG: 10 INJECTION, SOLUTION INTRAVENOUS at 04:45

## 2023-01-01 RX ADMIN — MORPHINE SULFATE 30 MG: 15 TABLET ORAL at 11:45

## 2023-01-01 RX ADMIN — DOCUSATE SODIUM 50 MG AND SENNOSIDES 8.6 MG 2 TABLET: 8.6; 5 TABLET, FILM COATED ORAL at 05:08

## 2023-01-01 RX ADMIN — THERA TABS 1 TABLET: TAB at 07:50

## 2023-01-01 RX ADMIN — HEPARIN SODIUM 5000 UNITS: 5000 INJECTION, SOLUTION INTRAVENOUS; SUBCUTANEOUS at 21:18

## 2023-01-01 RX ADMIN — PREGABALIN 150 MG: 150 CAPSULE ORAL at 08:15

## 2023-01-01 RX ADMIN — HEPARIN SODIUM 5000 UNITS: 5000 INJECTION, SOLUTION INTRAVENOUS; SUBCUTANEOUS at 14:39

## 2023-01-01 RX ADMIN — DIBASIC SODIUM PHOSPHATE, MONOBASIC POTASSIUM PHOSPHATE AND MONOBASIC SODIUM PHOSPHATE 500 MG: 852; 155; 130 TABLET ORAL at 17:08

## 2023-01-01 RX ADMIN — LIDOCAINE 2 PATCH: 700 PATCH TOPICAL at 16:07

## 2023-01-01 RX ADMIN — FUROSEMIDE 10 MG/HR: 10 INJECTION, SOLUTION INTRAMUSCULAR; INTRAVENOUS at 04:44

## 2023-01-01 RX ADMIN — HYDROMORPHONE HYDROCHLORIDE 0.5 MG: 1 INJECTION, SOLUTION INTRAMUSCULAR; INTRAVENOUS; SUBCUTANEOUS at 23:03

## 2023-01-01 RX ADMIN — MAGNESIUM HYDROXIDE 30 ML: 1200 LIQUID ORAL at 09:56

## 2023-01-01 RX ADMIN — PREGABALIN 150 MG: 150 CAPSULE ORAL at 08:26

## 2023-01-01 RX ADMIN — HEPARIN SODIUM 5000 UNITS: 5000 INJECTION, SOLUTION INTRAVENOUS; SUBCUTANEOUS at 20:43

## 2023-01-01 RX ADMIN — SPIRONOLACTONE 25 MG: 25 TABLET ORAL at 05:33

## 2023-01-01 RX ADMIN — HYDROMORPHONE HYDROCHLORIDE 1 MG: 1 INJECTION, SOLUTION INTRAMUSCULAR; INTRAVENOUS; SUBCUTANEOUS at 14:01

## 2023-01-01 RX ADMIN — OMEPRAZOLE 40 MG: 20 CAPSULE, DELAYED RELEASE ORAL at 05:09

## 2023-01-01 RX ADMIN — ACETAMINOPHEN 650 MG: 325 TABLET ORAL at 11:10

## 2023-01-01 RX ADMIN — PREGABALIN 150 MG: 150 CAPSULE ORAL at 07:51

## 2023-01-01 RX ADMIN — CEFAZOLIN 2 G: 2 INJECTION, POWDER, FOR SOLUTION INTRAMUSCULAR; INTRAVENOUS at 13:55

## 2023-01-01 RX ADMIN — MORPHINE SULFATE 30 MG: 30 TABLET, FILM COATED, EXTENDED RELEASE ORAL at 20:28

## 2023-01-01 RX ADMIN — SENNOSIDES AND DOCUSATE SODIUM 2 TABLET: 50; 8.6 TABLET ORAL at 08:32

## 2023-01-01 RX ADMIN — DEXTROSE MONOHYDRATE: 50 INJECTION, SOLUTION INTRAVENOUS at 20:00

## 2023-01-01 RX ADMIN — MORPHINE SULFATE 30 MG: 15 TABLET ORAL at 10:14

## 2023-01-01 RX ADMIN — Medication 1000 UNITS: at 06:31

## 2023-01-01 RX ADMIN — PREDNISONE 10 MG: 5 TABLET ORAL at 20:41

## 2023-01-01 RX ADMIN — DAPAGLIFLOZIN 10 MG: 10 TABLET, FILM COATED ORAL at 08:36

## 2023-01-01 RX ADMIN — DOCUSATE SODIUM 10 MG: 50 LIQUID ORAL at 16:55

## 2023-01-01 RX ADMIN — SENNOSIDES AND DOCUSATE SODIUM 2 TABLET: 50; 8.6 TABLET ORAL at 08:55

## 2023-01-01 RX ADMIN — MORPHINE SULFATE 30 MG: 15 TABLET ORAL at 12:43

## 2023-01-01 RX ADMIN — FUROSEMIDE 20 MG: 20 TABLET ORAL at 05:08

## 2023-01-01 RX ADMIN — PREGABALIN 150 MG: 150 CAPSULE ORAL at 20:28

## 2023-01-01 RX ADMIN — ACETAMINOPHEN 650 MG: 325 TABLET, FILM COATED ORAL at 05:33

## 2023-01-01 RX ADMIN — PREGABALIN 150 MG: 150 CAPSULE ORAL at 21:15

## 2023-01-01 RX ADMIN — FUROSEMIDE 20 MG: 20 TABLET ORAL at 05:43

## 2023-01-01 RX ADMIN — AMIODARONE HYDROCHLORIDE 400 MG: 200 TABLET ORAL at 05:23

## 2023-01-01 RX ADMIN — MAGNESIUM SULFATE HEPTAHYDRATE 2 G: 40 INJECTION, SOLUTION INTRAVENOUS at 09:45

## 2023-01-01 RX ADMIN — BISACODYL 10 MG: 10 SUPPOSITORY RECTAL at 15:13

## 2023-01-01 RX ADMIN — LACTULOSE 30 ML: 20 SOLUTION ORAL at 05:20

## 2023-01-01 RX ADMIN — SENNOSIDES AND DOCUSATE SODIUM 2 TABLET: 50; 8.6 TABLET ORAL at 08:05

## 2023-01-01 RX ADMIN — PREDNISONE 10 MG: 5 TABLET ORAL at 22:46

## 2023-01-01 RX ADMIN — MORPHINE SULFATE 30 MG: 15 TABLET ORAL at 08:08

## 2023-01-01 RX ADMIN — Medication 1000 UNITS: at 08:05

## 2023-01-01 RX ADMIN — HYDROMORPHONE HYDROCHLORIDE 0.5 MG: 1 INJECTION, SOLUTION INTRAMUSCULAR; INTRAVENOUS; SUBCUTANEOUS at 20:54

## 2023-01-01 RX ADMIN — MORPHINE SULFATE 30 MG: 30 TABLET, FILM COATED, EXTENDED RELEASE ORAL at 08:22

## 2023-01-01 RX ADMIN — HYDROMORPHONE HYDROCHLORIDE 0.5 MG: 1 INJECTION, SOLUTION INTRAMUSCULAR; INTRAVENOUS; SUBCUTANEOUS at 11:20

## 2023-01-01 RX ADMIN — PREGABALIN 150 MG: 150 CAPSULE ORAL at 20:43

## 2023-01-01 RX ADMIN — METHOCARBAMOL TABLETS 750 MG: 750 TABLET, COATED ORAL at 09:20

## 2023-01-01 RX ADMIN — BUPIVACAINE HYDROCHLORIDE 30 ML: 2.5 INJECTION, SOLUTION EPIDURAL; INFILTRATION; INTRACAUDAL at 17:12

## 2023-01-01 RX ADMIN — HYDROMORPHONE HYDROCHLORIDE 1 MG: 1 INJECTION, SOLUTION INTRAMUSCULAR; INTRAVENOUS; SUBCUTANEOUS at 18:40

## 2023-01-01 RX ADMIN — ONDANSETRON 4 MG: 2 INJECTION INTRAMUSCULAR; INTRAVENOUS at 18:14

## 2023-01-01 RX ADMIN — MORPHINE SULFATE 30 MG: 15 TABLET ORAL at 06:04

## 2023-01-01 RX ADMIN — METHOCARBAMOL TABLETS 750 MG: 750 TABLET, COATED ORAL at 11:12

## 2023-01-01 RX ADMIN — OXYCODONE HYDROCHLORIDE 15 MG: 15 TABLET ORAL at 17:56

## 2023-01-01 RX ADMIN — METHYLPREDNISOLONE 4 MG: 4 TABLET ORAL at 08:06

## 2023-01-01 RX ADMIN — HEPARIN SODIUM 5000 UNITS: 5000 INJECTION, SOLUTION INTRAVENOUS; SUBCUTANEOUS at 21:51

## 2023-01-01 RX ADMIN — HYDROMORPHONE HYDROCHLORIDE 1 MG: 1 INJECTION, SOLUTION INTRAMUSCULAR; INTRAVENOUS; SUBCUTANEOUS at 22:05

## 2023-01-01 RX ADMIN — MORPHINE SULFATE 30 MG: 15 TABLET ORAL at 12:31

## 2023-01-01 RX ADMIN — DEXAMETHASONE SODIUM PHOSPHATE 8 MG: 4 INJECTION INTRA-ARTICULAR; INTRALESIONAL; INTRAMUSCULAR; INTRAVENOUS; SOFT TISSUE at 08:47

## 2023-01-01 RX ADMIN — PREGABALIN 150 MG: 150 CAPSULE ORAL at 08:42

## 2023-01-01 RX ADMIN — SODIUM CHLORIDE, POTASSIUM CHLORIDE, SODIUM LACTATE AND CALCIUM CHLORIDE: 600; 310; 30; 20 INJECTION, SOLUTION INTRAVENOUS at 14:55

## 2023-01-01 RX ADMIN — MORPHINE SULFATE 30 MG: 15 TABLET ORAL at 05:52

## 2023-01-01 RX ADMIN — CLOTRIMAZOLE: 10 CREAM TOPICAL at 18:00

## 2023-01-01 RX ADMIN — PIPERACILLIN AND TAZOBACTAM 4.5 G: 4; .5 INJECTION, POWDER, FOR SOLUTION INTRAVENOUS at 00:20

## 2023-01-01 RX ADMIN — PROPOFOL 150 MG: 10 INJECTION, EMULSION INTRAVENOUS at 09:38

## 2023-01-01 RX ADMIN — HYDROCORTISONE: 1 CREAM TOPICAL at 21:27

## 2023-01-01 RX ADMIN — SODIUM HYPOCHLORITE 100 ML: 1.25 SOLUTION TOPICAL at 18:00

## 2023-01-01 RX ADMIN — HYDROMORPHONE HYDROCHLORIDE 0.5 MG: 1 INJECTION, SOLUTION INTRAMUSCULAR; INTRAVENOUS; SUBCUTANEOUS at 10:17

## 2023-01-01 RX ADMIN — DAPAGLIFLOZIN 10 MG: 10 TABLET, FILM COATED ORAL at 17:24

## 2023-01-01 RX ADMIN — HYDROMORPHONE HYDROCHLORIDE 0.5 MG: 1 INJECTION, SOLUTION INTRAMUSCULAR; INTRAVENOUS; SUBCUTANEOUS at 14:51

## 2023-01-01 RX ADMIN — PREGABALIN 200 MG: 100 CAPSULE ORAL at 08:38

## 2023-01-01 RX ADMIN — MORPHINE SULFATE 30 MG: 15 TABLET ORAL at 04:55

## 2023-01-01 RX ADMIN — SENNOSIDES AND DOCUSATE SODIUM 2 TABLET: 50; 8.6 TABLET ORAL at 08:42

## 2023-01-01 RX ADMIN — GABAPENTIN 300 MG: 300 CAPSULE ORAL at 08:44

## 2023-01-01 RX ADMIN — MORPHINE SULFATE 30 MG: 15 TABLET ORAL at 14:08

## 2023-01-01 RX ADMIN — ACETAMINOPHEN 1000 MG: 500 TABLET ORAL at 15:22

## 2023-01-01 RX ADMIN — SODIUM HYPOCHLORITE 473 ML: 1.25 SOLUTION TOPICAL at 05:14

## 2023-01-01 RX ADMIN — SODIUM CHLORIDE, PRESERVATIVE FREE 20 ML: 5 INJECTION INTRAVENOUS at 11:50

## 2023-01-01 RX ADMIN — POTASSIUM CHLORIDE 40 MEQ: 1500 TABLET, EXTENDED RELEASE ORAL at 14:46

## 2023-01-01 RX ADMIN — NICOTINE 7 MG: 7 PATCH TRANSDERMAL at 06:10

## 2023-01-01 RX ADMIN — PROPOFOL 200 MG: 10 INJECTION, EMULSION INTRAVENOUS at 08:44

## 2023-01-01 RX ADMIN — MORPHINE SULFATE 30 MG: 15 TABLET ORAL at 14:52

## 2023-01-01 RX ADMIN — SENNOSIDES AND DOCUSATE SODIUM 2 TABLET: 8.6; 5 TABLET ORAL at 21:54

## 2023-01-01 RX ADMIN — MORPHINE SULFATE 30 MG: 30 TABLET, FILM COATED, EXTENDED RELEASE ORAL at 08:12

## 2023-01-01 RX ADMIN — NICOTINE 7 MG: 7 PATCH TRANSDERMAL at 06:01

## 2023-01-01 RX ADMIN — MORPHINE SULFATE 30 MG: 15 TABLET ORAL at 14:48

## 2023-01-01 RX ADMIN — POTASSIUM CHLORIDE 40 MEQ: 1500 TABLET, EXTENDED RELEASE ORAL at 05:28

## 2023-01-01 RX ADMIN — PREDNISONE 40 MG: 20 TABLET ORAL at 05:09

## 2023-01-01 RX ADMIN — Medication 950 MG: at 09:11

## 2023-01-01 RX ADMIN — AMIODARONE HYDROCHLORIDE 400 MG: 200 TABLET ORAL at 06:32

## 2023-01-01 RX ADMIN — OXYCODONE HYDROCHLORIDE 10 MG: 10 TABLET ORAL at 21:57

## 2023-01-01 RX ADMIN — HYDROMORPHONE HYDROCHLORIDE 0.5 MG: 1 INJECTION, SOLUTION INTRAMUSCULAR; INTRAVENOUS; SUBCUTANEOUS at 11:46

## 2023-01-01 RX ADMIN — POTASSIUM CHLORIDE 40 MEQ: 1500 TABLET, EXTENDED RELEASE ORAL at 17:10

## 2023-01-01 RX ADMIN — POLYETHYLENE GLYCOL 3350 1 PACKET: 17 POWDER, FOR SOLUTION ORAL at 17:54

## 2023-01-01 RX ADMIN — POTASSIUM CHLORIDE 10 MEQ: 1500 TABLET, EXTENDED RELEASE ORAL at 08:46

## 2023-01-01 RX ADMIN — POTASSIUM CHLORIDE 10 MEQ: 1500 TABLET, EXTENDED RELEASE ORAL at 08:50

## 2023-01-01 RX ADMIN — APIXABAN 5 MG: 5 TABLET, FILM COATED ORAL at 08:51

## 2023-01-01 RX ADMIN — PREGABALIN 100 MG: 100 CAPSULE ORAL at 15:17

## 2023-01-01 RX ADMIN — OMEPRAZOLE 20 MG: 20 CAPSULE, DELAYED RELEASE ORAL at 05:18

## 2023-01-01 RX ADMIN — OXYCODONE HYDROCHLORIDE 10 MG: 10 TABLET ORAL at 08:44

## 2023-01-01 RX ADMIN — DULOXETINE HYDROCHLORIDE 60 MG: 30 CAPSULE, DELAYED RELEASE ORAL at 08:22

## 2023-01-01 RX ADMIN — MORPHINE SULFATE 15 MG: 15 TABLET ORAL at 14:25

## 2023-01-01 RX ADMIN — MORPHINE SULFATE 15 MG: 15 TABLET, FILM COATED, EXTENDED RELEASE ORAL at 18:11

## 2023-01-01 RX ADMIN — DOCUSATE SODIUM 50 MG AND SENNOSIDES 8.6 MG 2 TABLET: 8.6; 5 TABLET, FILM COATED ORAL at 05:22

## 2023-01-01 RX ADMIN — MORPHINE SULFATE 30 MG: 15 TABLET ORAL at 08:46

## 2023-01-01 RX ADMIN — HYDROMORPHONE HYDROCHLORIDE 1 MG: 1 INJECTION, SOLUTION INTRAMUSCULAR; INTRAVENOUS; SUBCUTANEOUS at 06:18

## 2023-01-01 RX ADMIN — ZOLPIDEM TARTRATE 5 MG: 5 TABLET ORAL at 22:50

## 2023-01-01 RX ADMIN — PREGABALIN 150 MG: 150 CAPSULE ORAL at 21:25

## 2023-01-01 RX ADMIN — OMEPRAZOLE 20 MG: 20 CAPSULE, DELAYED RELEASE ORAL at 06:26

## 2023-01-01 RX ADMIN — THIAMINE HCL TAB 100 MG 100 MG: 100 TAB at 05:26

## 2023-01-01 RX ADMIN — MORPHINE SULFATE 30 MG: 15 TABLET ORAL at 05:50

## 2023-01-01 RX ADMIN — PREGABALIN 150 MG: 150 CAPSULE ORAL at 14:44

## 2023-01-01 RX ADMIN — MORPHINE SULFATE 30 MG: 15 TABLET ORAL at 15:26

## 2023-01-01 RX ADMIN — DRONABINOL 2.5 MG: 2.5 CAPSULE ORAL at 11:26

## 2023-01-01 RX ADMIN — ZOLPIDEM TARTRATE 10 MG: 5 TABLET ORAL at 00:05

## 2023-01-01 RX ADMIN — HYDROCORTISONE SODIUM SUCCINATE 50 MG: 100 INJECTION, POWDER, FOR SOLUTION INTRAMUSCULAR; INTRAVENOUS at 18:22

## 2023-01-01 RX ADMIN — MORPHINE SULFATE 30 MG: 15 TABLET ORAL at 17:44

## 2023-01-01 RX ADMIN — OXYCODONE HYDROCHLORIDE 10 MG: 5 TABLET ORAL at 20:27

## 2023-01-01 RX ADMIN — ACETAMINOPHEN 650 MG: 325 TABLET ORAL at 05:37

## 2023-01-01 RX ADMIN — AZITHROMYCIN MONOHYDRATE 500 MG: 250 TABLET ORAL at 16:41

## 2023-01-01 RX ADMIN — DOCUSATE SODIUM 50 MG AND SENNOSIDES 8.6 MG 2 TABLET: 8.6; 5 TABLET, FILM COATED ORAL at 04:34

## 2023-01-01 RX ADMIN — SPIRONOLACTONE 25 MG: 25 TABLET ORAL at 05:18

## 2023-01-01 RX ADMIN — MORPHINE SULFATE 15 MG: 15 TABLET, FILM COATED, EXTENDED RELEASE ORAL at 08:05

## 2023-01-01 RX ADMIN — Medication 3 MG: at 21:26

## 2023-01-01 RX ADMIN — HEPARIN SODIUM 5000 UNITS: 5000 INJECTION, SOLUTION INTRAVENOUS; SUBCUTANEOUS at 05:50

## 2023-01-01 RX ADMIN — PREDNISONE 10 MG: 5 TABLET ORAL at 08:51

## 2023-01-01 RX ADMIN — MORPHINE SULFATE 15 MG: 15 TABLET, FILM COATED, EXTENDED RELEASE ORAL at 09:37

## 2023-01-01 RX ADMIN — FERROUS SULFATE TAB 325 MG (65 MG ELEMENTAL FE) 325 MG: 325 (65 FE) TAB at 08:53

## 2023-01-01 RX ADMIN — ENOXAPARIN SODIUM 40 MG: 100 INJECTION SUBCUTANEOUS at 17:05

## 2023-01-01 RX ADMIN — PIPERACILLIN AND TAZOBACTAM 4.5 G: 4; .5 INJECTION, POWDER, LYOPHILIZED, FOR SOLUTION INTRAVENOUS; PARENTERAL at 06:29

## 2023-01-01 RX ADMIN — SENNOSIDES AND DOCUSATE SODIUM 2 TABLET: 50; 8.6 TABLET ORAL at 08:12

## 2023-01-01 RX ADMIN — METHOCARBAMOL 1000 MG: 100 INJECTION, SOLUTION INTRAMUSCULAR; INTRAVENOUS at 09:56

## 2023-01-01 RX ADMIN — MIDAZOLAM HYDROCHLORIDE 2 MG: 1 INJECTION, SOLUTION INTRAMUSCULAR; INTRAVENOUS at 14:19

## 2023-01-01 RX ADMIN — METHOCARBAMOL TABLETS 750 MG: 750 TABLET, COATED ORAL at 20:38

## 2023-01-01 RX ADMIN — CEFAZOLIN 2 G: 2 INJECTION, POWDER, FOR SOLUTION INTRAMUSCULAR; INTRAVENOUS at 13:13

## 2023-01-01 RX ADMIN — CEFEPIME 2 G: 2 INJECTION, POWDER, FOR SOLUTION INTRAVENOUS at 14:01

## 2023-01-01 RX ADMIN — METHOCARBAMOL TABLETS 750 MG: 750 TABLET, COATED ORAL at 05:19

## 2023-01-01 RX ADMIN — SODIUM CHLORIDE 250 MG: 9 INJECTION, SOLUTION INTRAVENOUS at 18:03

## 2023-01-01 RX ADMIN — OXYCODONE HYDROCHLORIDE 20 MG: 20 TABLET, FILM COATED, EXTENDED RELEASE ORAL at 13:45

## 2023-01-01 RX ADMIN — SENNOSIDES AND DOCUSATE SODIUM 2 TABLET: 50; 8.6 TABLET ORAL at 08:15

## 2023-01-01 RX ADMIN — MORPHINE SULFATE 15 MG: 15 TABLET, FILM COATED, EXTENDED RELEASE ORAL at 05:47

## 2023-01-01 RX ADMIN — Medication 950 MG: at 06:10

## 2023-01-01 RX ADMIN — MAGNESIUM SULFATE HEPTAHYDRATE 2 G: 2 INJECTION, SOLUTION INTRAVENOUS at 19:29

## 2023-01-01 RX ADMIN — NICOTINE 7 MG: 7 PATCH, EXTENDED RELEASE TRANSDERMAL at 05:28

## 2023-01-01 RX ADMIN — ACETAMINOPHEN 650 MG: 325 TABLET ORAL at 17:22

## 2023-01-01 RX ADMIN — MORPHINE SULFATE 30 MG: 15 TABLET ORAL at 18:24

## 2023-01-01 RX ADMIN — THIAMINE HYDROCHLORIDE 100 MG: 100 INJECTION, SOLUTION INTRAMUSCULAR; INTRAVENOUS at 05:29

## 2023-01-01 RX ADMIN — SODIUM CHLORIDE, PRESERVATIVE FREE 20 ML: 5 INJECTION INTRAVENOUS at 21:12

## 2023-01-01 RX ADMIN — PREGABALIN 150 MG: 150 CAPSULE ORAL at 20:41

## 2023-01-01 RX ADMIN — APIXABAN 5 MG: 5 TABLET, FILM COATED ORAL at 21:12

## 2023-01-01 RX ADMIN — CEFAZOLIN 2 G: 2 INJECTION, POWDER, FOR SOLUTION INTRAMUSCULAR; INTRAVENOUS at 16:28

## 2023-01-01 RX ADMIN — MORPHINE SULFATE 30 MG: 15 TABLET ORAL at 23:24

## 2023-01-01 RX ADMIN — DULOXETINE HYDROCHLORIDE 60 MG: 30 CAPSULE, DELAYED RELEASE ORAL at 08:36

## 2023-01-01 RX ADMIN — ENOXAPARIN SODIUM 40 MG: 100 INJECTION SUBCUTANEOUS at 17:56

## 2023-01-01 RX ADMIN — GABAPENTIN 300 MG: 300 CAPSULE ORAL at 21:21

## 2023-01-01 RX ADMIN — HYDROMORPHONE HYDROCHLORIDE 0.5 MG: 1 INJECTION, SOLUTION INTRAMUSCULAR; INTRAVENOUS; SUBCUTANEOUS at 10:46

## 2023-01-01 RX ADMIN — MORPHINE SULFATE 30 MG: 15 TABLET ORAL at 17:25

## 2023-01-01 RX ADMIN — SODIUM CHLORIDE, SODIUM LACTATE, POTASSIUM CHLORIDE, CALCIUM CHLORIDE AND DEXTROSE MONOHYDRATE: 5; 600; 310; 30; 20 INJECTION, SOLUTION INTRAVENOUS at 20:27

## 2023-01-01 RX ADMIN — OMEPRAZOLE 20 MG: 20 CAPSULE, DELAYED RELEASE ORAL at 21:40

## 2023-01-01 RX ADMIN — MORPHINE SULFATE 30 MG: 15 TABLET ORAL at 10:00

## 2023-01-01 RX ADMIN — CEFAZOLIN 2 G: 2 INJECTION, POWDER, FOR SOLUTION INTRAMUSCULAR; INTRAVENOUS at 06:00

## 2023-01-01 RX ADMIN — GABAPENTIN 300 MG: 300 CAPSULE ORAL at 05:15

## 2023-01-01 RX ADMIN — THIAMINE HCL TAB 100 MG 100 MG: 100 TAB at 08:44

## 2023-01-01 RX ADMIN — SODIUM CHLORIDE, PRESERVATIVE FREE 20 ML: 5 INJECTION INTRAVENOUS at 08:09

## 2023-01-01 RX ADMIN — FENTANYL CITRATE 50 MCG: 50 INJECTION, SOLUTION INTRAMUSCULAR; INTRAVENOUS at 15:22

## 2023-01-01 RX ADMIN — MORPHINE SULFATE 30 MG: 15 TABLET ORAL at 23:23

## 2023-01-01 RX ADMIN — LACTULOSE 30 ML: 20 SOLUTION ORAL at 12:54

## 2023-01-01 RX ADMIN — MORPHINE SULFATE 15 MG: 15 TABLET, FILM COATED, EXTENDED RELEASE ORAL at 06:12

## 2023-01-01 RX ADMIN — SODIUM PHOSPHATE, MONOBASIC, MONOHYDRATE AND SODIUM PHOSPHATE, DIBASIC, ANHYDROUS 30 MMOL: 142; 276 INJECTION, SOLUTION INTRAVENOUS at 09:50

## 2023-01-01 RX ADMIN — ACETAMINOPHEN 1000 MG: 500 TABLET ORAL at 21:18

## 2023-01-01 RX ADMIN — POTASSIUM CHLORIDE 20 MEQ: 1500 TABLET, EXTENDED RELEASE ORAL at 17:15

## 2023-01-01 RX ADMIN — OXYCODONE HYDROCHLORIDE 10 MG: 10 TABLET ORAL at 22:14

## 2023-01-01 RX ADMIN — MORPHINE SULFATE 15 MG: 15 TABLET, FILM COATED, EXTENDED RELEASE ORAL at 20:41

## 2023-01-01 RX ADMIN — FERROUS SULFATE TAB 325 MG (65 MG ELEMENTAL FE) 325 MG: 325 (65 FE) TAB at 08:44

## 2023-01-01 RX ADMIN — MORPHINE SULFATE 30 MG: 15 TABLET ORAL at 12:30

## 2023-01-01 RX ADMIN — PREGABALIN 150 MG: 150 CAPSULE ORAL at 15:27

## 2023-01-01 RX ADMIN — NICOTINE 7 MG: 7 PATCH, EXTENDED RELEASE TRANSDERMAL at 06:25

## 2023-01-01 RX ADMIN — HYDROMORPHONE HYDROCHLORIDE 1 MG: 1 INJECTION, SOLUTION INTRAMUSCULAR; INTRAVENOUS; SUBCUTANEOUS at 05:35

## 2023-01-01 RX ADMIN — SODIUM ZIRCONIUM CYCLOSILICATE 15 G: 5 POWDER, FOR SUSPENSION ORAL at 22:07

## 2023-01-01 RX ADMIN — SIMETHICONE 125 MG: 125 TABLET, CHEWABLE ORAL at 07:49

## 2023-01-01 RX ADMIN — MORPHINE SULFATE 30 MG: 15 TABLET ORAL at 16:53

## 2023-01-01 RX ADMIN — POTASSIUM CHLORIDE 20 MEQ: 1500 TABLET, EXTENDED RELEASE ORAL at 09:24

## 2023-01-01 RX ADMIN — HYDROMORPHONE HYDROCHLORIDE 0.5 MG: 1 INJECTION, SOLUTION INTRAMUSCULAR; INTRAVENOUS; SUBCUTANEOUS at 15:31

## 2023-01-01 RX ADMIN — FUROSEMIDE 20 MG: 20 TABLET ORAL at 06:36

## 2023-01-01 RX ADMIN — MORPHINE SULFATE 30 MG: 15 TABLET ORAL at 10:34

## 2023-01-01 RX ADMIN — FLUCONAZOLE 200 MG: 100 TABLET ORAL at 08:05

## 2023-01-01 RX ADMIN — DULOXETINE HYDROCHLORIDE 60 MG: 30 CAPSULE, DELAYED RELEASE ORAL at 08:05

## 2023-01-01 RX ADMIN — DULOXETINE HYDROCHLORIDE 60 MG: 30 CAPSULE, DELAYED RELEASE ORAL at 09:37

## 2023-01-01 RX ADMIN — ONDANSETRON 4 MG: 4 TABLET, ORALLY DISINTEGRATING ORAL at 08:49

## 2023-01-01 RX ADMIN — POTASSIUM CHLORIDE 40 MEQ: 1500 TABLET, EXTENDED RELEASE ORAL at 09:45

## 2023-01-01 RX ADMIN — THIAMINE HCL TAB 100 MG 100 MG: 100 TAB at 06:10

## 2023-01-01 RX ADMIN — ERGOCALCIFEROL 50000 UNITS: 1.25 CAPSULE ORAL at 08:37

## 2023-01-01 RX ADMIN — SODIUM CHLORIDE: 9 INJECTION, SOLUTION INTRAVENOUS at 22:35

## 2023-01-01 RX ADMIN — SENNOSIDES AND DOCUSATE SODIUM 2 TABLET: 50; 8.6 TABLET ORAL at 09:16

## 2023-01-01 RX ADMIN — Medication: at 17:31

## 2023-01-01 RX ADMIN — MICONAZOLE NITRATE: 20 CREAM TOPICAL at 18:09

## 2023-01-01 RX ADMIN — AMIODARONE HYDROCHLORIDE 400 MG: 200 TABLET ORAL at 05:07

## 2023-01-01 RX ADMIN — DULOXETINE HYDROCHLORIDE 60 MG: 30 CAPSULE, DELAYED RELEASE ORAL at 09:43

## 2023-01-01 RX ADMIN — POTASSIUM CHLORIDE 10 MEQ: 1500 TABLET, EXTENDED RELEASE ORAL at 08:14

## 2023-01-01 RX ADMIN — DAPAGLIFLOZIN 10 MG: 10 TABLET, FILM COATED ORAL at 08:21

## 2023-01-01 RX ADMIN — CEFAZOLIN 2 G: 2 INJECTION, POWDER, FOR SOLUTION INTRAMUSCULAR; INTRAVENOUS at 13:26

## 2023-01-01 RX ADMIN — HYDROCORTISONE SODIUM SUCCINATE 50 MG: 100 INJECTION, POWDER, FOR SOLUTION INTRAMUSCULAR; INTRAVENOUS at 06:13

## 2023-01-01 RX ADMIN — HYDROMORPHONE HYDROCHLORIDE 1 MG: 1 INJECTION, SOLUTION INTRAMUSCULAR; INTRAVENOUS; SUBCUTANEOUS at 02:53

## 2023-01-01 RX ADMIN — MORPHINE SULFATE 30 MG: 15 TABLET ORAL at 23:49

## 2023-01-01 RX ADMIN — SENNOSIDES AND DOCUSATE SODIUM 2 TABLET: 50; 8.6 TABLET ORAL at 21:40

## 2023-01-01 RX ADMIN — ACETAMINOPHEN 650 MG: 325 TABLET ORAL at 17:56

## 2023-01-01 RX ADMIN — IOHEXOL 100 ML: 350 INJECTION, SOLUTION INTRAVENOUS at 22:48

## 2023-01-01 RX ADMIN — MORPHINE SULFATE 30 MG: 15 TABLET ORAL at 21:36

## 2023-01-01 RX ADMIN — AMOXICILLIN AND CLAVULANATE POTASSIUM 1 TABLET: 875; 125 TABLET, FILM COATED ORAL at 17:27

## 2023-01-01 RX ADMIN — MORPHINE SULFATE 15 MG: 15 TABLET ORAL at 21:28

## 2023-01-01 RX ADMIN — SODIUM CHLORIDE, PRESERVATIVE FREE 20 ML: 5 INJECTION INTRAVENOUS at 21:43

## 2023-01-01 RX ADMIN — Medication 950 MG: at 05:32

## 2023-01-01 RX ADMIN — SIMETHICONE 125 MG: 125 TABLET, CHEWABLE ORAL at 20:14

## 2023-01-01 RX ADMIN — HYDROMORPHONE HYDROCHLORIDE 0.5 MG: 1 INJECTION, SOLUTION INTRAMUSCULAR; INTRAVENOUS; SUBCUTANEOUS at 14:23

## 2023-01-01 RX ADMIN — IPRATROPIUM BROMIDE AND ALBUTEROL SULFATE 3 ML: .5; 2.5 SOLUTION RESPIRATORY (INHALATION) at 14:58

## 2023-01-01 RX ADMIN — SENNOSIDES AND DOCUSATE SODIUM 2 TABLET: 8.6; 5 TABLET ORAL at 21:02

## 2023-01-01 RX ADMIN — MORPHINE SULFATE 30 MG: 30 TABLET, FILM COATED, EXTENDED RELEASE ORAL at 21:25

## 2023-01-01 RX ADMIN — DEXTROSE MONOHYDRATE 12.5 G: 100 INJECTION, SOLUTION INTRAVENOUS at 15:52

## 2023-01-01 RX ADMIN — GABAPENTIN 300 MG: 300 CAPSULE ORAL at 15:10

## 2023-01-01 RX ADMIN — MORPHINE SULFATE 30 MG: 15 TABLET ORAL at 14:15

## 2023-01-01 RX ADMIN — MORPHINE SULFATE 30 MG: 15 TABLET ORAL at 05:23

## 2023-01-01 RX ADMIN — Medication 1000 UNITS: at 05:37

## 2023-01-01 RX ADMIN — OXYCODONE HYDROCHLORIDE 10 MG: 10 TABLET ORAL at 03:50

## 2023-01-01 RX ADMIN — MORPHINE SULFATE 15 MG: 15 TABLET ORAL at 15:19

## 2023-01-01 RX ADMIN — SODIUM CHLORIDE 1000 ML: 9 INJECTION, SOLUTION INTRAVENOUS at 21:54

## 2023-01-01 RX ADMIN — MORPHINE SULFATE 15 MG: 15 TABLET, FILM COATED, EXTENDED RELEASE ORAL at 17:52

## 2023-01-01 RX ADMIN — MORPHINE SULFATE 30 MG: 15 TABLET ORAL at 05:59

## 2023-01-01 RX ADMIN — NICOTINE 7 MG: 7 PATCH TRANSDERMAL at 05:08

## 2023-01-01 RX ADMIN — THIAMINE HCL TAB 100 MG 100 MG: 100 TAB at 06:43

## 2023-01-01 RX ADMIN — DULOXETINE HYDROCHLORIDE 60 MG: 30 CAPSULE, DELAYED RELEASE ORAL at 08:46

## 2023-01-01 RX ADMIN — ACETAMINOPHEN 650 MG: 325 TABLET, FILM COATED ORAL at 00:15

## 2023-01-01 RX ADMIN — MORPHINE SULFATE 30 MG: 30 TABLET, FILM COATED, EXTENDED RELEASE ORAL at 17:25

## 2023-01-01 RX ADMIN — SENNOSIDES AND DOCUSATE SODIUM 2 TABLET: 50; 8.6 TABLET ORAL at 05:09

## 2023-01-01 RX ADMIN — MAGNESIUM HYDROXIDE 30 ML: 1200 LIQUID ORAL at 14:45

## 2023-01-01 RX ADMIN — CEFAZOLIN 2 G: 2 INJECTION, POWDER, FOR SOLUTION INTRAMUSCULAR; INTRAVENOUS at 05:55

## 2023-01-01 RX ADMIN — HYDROMORPHONE HYDROCHLORIDE 0.5 MG: 1 INJECTION, SOLUTION INTRAMUSCULAR; INTRAVENOUS; SUBCUTANEOUS at 21:13

## 2023-01-01 RX ADMIN — POTASSIUM CHLORIDE 10 MEQ: 1500 TABLET, EXTENDED RELEASE ORAL at 08:35

## 2023-01-01 RX ADMIN — DULOXETINE HYDROCHLORIDE 60 MG: 30 CAPSULE, DELAYED RELEASE ORAL at 08:59

## 2023-01-01 RX ADMIN — MORPHINE SULFATE 15 MG: 15 TABLET, FILM COATED, EXTENDED RELEASE ORAL at 06:10

## 2023-01-01 RX ADMIN — CEFAZOLIN 2 G: 2 INJECTION, POWDER, FOR SOLUTION INTRAMUSCULAR; INTRAVENOUS at 15:19

## 2023-01-01 RX ADMIN — OMEPRAZOLE 20 MG: 20 CAPSULE, DELAYED RELEASE ORAL at 20:19

## 2023-01-01 RX ADMIN — HEPARIN SODIUM 5000 UNITS: 5000 INJECTION, SOLUTION INTRAVENOUS; SUBCUTANEOUS at 13:22

## 2023-01-01 RX ADMIN — POTASSIUM CHLORIDE 40 MEQ: 1500 TABLET, EXTENDED RELEASE ORAL at 21:19

## 2023-01-01 RX ADMIN — DAPAGLIFLOZIN 10 MG: 10 TABLET, FILM COATED ORAL at 05:09

## 2023-01-01 RX ADMIN — ACETAMINOPHEN 1000 MG: 500 TABLET ORAL at 08:12

## 2023-01-01 RX ADMIN — METHOCARBAMOL TABLETS 750 MG: 750 TABLET, COATED ORAL at 21:35

## 2023-01-01 RX ADMIN — CEFEPIME 2 G: 2 INJECTION, POWDER, FOR SOLUTION INTRAVENOUS at 06:34

## 2023-01-01 RX ADMIN — MORPHINE SULFATE 15 MG: 15 TABLET, FILM COATED, EXTENDED RELEASE ORAL at 08:16

## 2023-01-01 RX ADMIN — METHOCARBAMOL 750 MG: 750 TABLET ORAL at 04:50

## 2023-01-01 RX ADMIN — DEXTROSE AND SODIUM CHLORIDE: 5; 900 INJECTION, SOLUTION INTRAVENOUS at 03:03

## 2023-01-01 RX ADMIN — PREGABALIN 150 MG: 150 CAPSULE ORAL at 14:24

## 2023-01-01 RX ADMIN — PREDNISONE 40 MG: 20 TABLET ORAL at 05:38

## 2023-01-01 RX ADMIN — THIAMINE HCL TAB 100 MG 100 MG: 100 TAB at 08:40

## 2023-01-01 RX ADMIN — SODIUM CHLORIDE, PRESERVATIVE FREE 20 ML: 5 INJECTION INTRAVENOUS at 22:06

## 2023-01-01 RX ADMIN — ACETAMINOPHEN 1000 MG: 500 TABLET ORAL at 21:35

## 2023-01-01 RX ADMIN — MORPHINE SULFATE 15 MG: 15 TABLET, FILM COATED, EXTENDED RELEASE ORAL at 18:45

## 2023-01-01 RX ADMIN — DULOXETINE HYDROCHLORIDE 60 MG: 60 CAPSULE, DELAYED RELEASE ORAL at 05:47

## 2023-01-01 RX ADMIN — OXYCODONE HYDROCHLORIDE 10 MG: 10 TABLET ORAL at 20:33

## 2023-01-01 RX ADMIN — MORPHINE SULFATE 30 MG: 15 TABLET ORAL at 17:05

## 2023-01-01 RX ADMIN — MORPHINE SULFATE 30 MG: 15 TABLET ORAL at 15:53

## 2023-01-01 RX ADMIN — OXYCODONE HYDROCHLORIDE 10 MG: 5 TABLET ORAL at 18:34

## 2023-01-01 RX ADMIN — OMEPRAZOLE 20 MG: 20 CAPSULE, DELAYED RELEASE ORAL at 05:24

## 2023-01-01 RX ADMIN — MORPHINE SULFATE 30 MG: 15 TABLET ORAL at 11:39

## 2023-01-01 RX ADMIN — MORPHINE SULFATE 30 MG: 15 TABLET ORAL at 23:46

## 2023-01-01 RX ADMIN — MORPHINE SULFATE 30 MG: 15 TABLET ORAL at 18:35

## 2023-01-01 RX ADMIN — PREGABALIN 150 MG: 150 CAPSULE ORAL at 10:27

## 2023-01-01 RX ADMIN — OMEPRAZOLE 20 MG: 20 CAPSULE, DELAYED RELEASE ORAL at 05:16

## 2023-01-01 RX ADMIN — DOCUSATE SODIUM 50 MG AND SENNOSIDES 8.6 MG 2 TABLET: 8.6; 5 TABLET, FILM COATED ORAL at 05:29

## 2023-01-01 RX ADMIN — LINEZOLID 600 MG: 600 TABLET, FILM COATED ORAL at 11:38

## 2023-01-01 RX ADMIN — MORPHINE SULFATE 30 MG: 15 TABLET ORAL at 11:41

## 2023-01-01 RX ADMIN — ACETAMINOPHEN 650 MG: 325 TABLET, FILM COATED ORAL at 05:15

## 2023-01-01 RX ADMIN — MORPHINE SULFATE 30 MG: 15 TABLET ORAL at 17:08

## 2023-01-01 RX ADMIN — PREGABALIN 150 MG: 150 CAPSULE ORAL at 14:53

## 2023-01-01 RX ADMIN — FUROSEMIDE 40 MG: 10 INJECTION, SOLUTION INTRAVENOUS at 05:54

## 2023-01-01 RX ADMIN — Medication 2000 UNITS: at 08:41

## 2023-01-01 RX ADMIN — HYDROMORPHONE HYDROCHLORIDE 0.5 MG: 1 INJECTION, SOLUTION INTRAMUSCULAR; INTRAVENOUS; SUBCUTANEOUS at 18:41

## 2023-01-01 RX ADMIN — HYDROCORTISONE: 1 CREAM TOPICAL at 20:18

## 2023-01-01 RX ADMIN — OXYCODONE HYDROCHLORIDE 10 MG: 10 TABLET ORAL at 17:31

## 2023-01-01 RX ADMIN — PIPERACILLIN AND TAZOBACTAM 4.5 G: 4; .5 INJECTION, POWDER, FOR SOLUTION INTRAVENOUS at 07:11

## 2023-01-01 RX ADMIN — KETOCONAZOLE CREAM, 2%: 20 CREAM TOPICAL at 05:27

## 2023-01-01 RX ADMIN — CEFAZOLIN 2 G: 2 INJECTION, POWDER, FOR SOLUTION INTRAMUSCULAR; INTRAVENOUS at 21:29

## 2023-01-01 RX ADMIN — LINEZOLID 600 MG: 600 TABLET, FILM COATED ORAL at 05:08

## 2023-01-01 RX ADMIN — OXYCODONE HYDROCHLORIDE 10 MG: 10 TABLET ORAL at 08:26

## 2023-01-01 RX ADMIN — Medication 3 MG: at 20:43

## 2023-01-01 RX ADMIN — Medication 3 MG: at 20:27

## 2023-01-01 RX ADMIN — THIAMINE HYDROCHLORIDE 100 MG: 100 INJECTION, SOLUTION INTRAMUSCULAR; INTRAVENOUS at 05:47

## 2023-01-01 RX ADMIN — LIDOCAINE 2 PATCH: 700 PATCH TOPICAL at 16:29

## 2023-01-01 RX ADMIN — Medication 1 APPLICATOR: at 05:57

## 2023-01-01 RX ADMIN — OMEPRAZOLE 40 MG: 20 CAPSULE, DELAYED RELEASE ORAL at 04:47

## 2023-01-01 RX ADMIN — METHYLPREDNISOLONE 4 MG: 4 TABLET ORAL at 11:05

## 2023-01-01 RX ADMIN — HYDROMORPHONE HYDROCHLORIDE 1 MG: 1 INJECTION, SOLUTION INTRAMUSCULAR; INTRAVENOUS; SUBCUTANEOUS at 14:39

## 2023-01-01 RX ADMIN — METHOCARBAMOL 750 MG: 750 TABLET ORAL at 13:37

## 2023-01-01 RX ADMIN — PREGABALIN 150 MG: 150 CAPSULE ORAL at 20:47

## 2023-01-01 RX ADMIN — SODIUM HYPOCHLORITE 3 ML: 1.25 SOLUTION TOPICAL at 17:04

## 2023-01-01 RX ADMIN — GABAPENTIN 600 MG: 300 CAPSULE ORAL at 06:15

## 2023-01-01 RX ADMIN — Medication 950 MG: at 05:26

## 2023-01-01 RX ADMIN — PREGABALIN 200 MG: 100 CAPSULE ORAL at 21:21

## 2023-01-01 RX ADMIN — DULOXETINE HYDROCHLORIDE 60 MG: 30 CAPSULE, DELAYED RELEASE ORAL at 05:25

## 2023-01-01 RX ADMIN — CEFAZOLIN 2 G: 2 INJECTION, POWDER, FOR SOLUTION INTRAMUSCULAR; INTRAVENOUS at 06:39

## 2023-01-01 RX ADMIN — LIDOCAINE 2 PATCH: 700 PATCH TOPICAL at 17:19

## 2023-01-01 RX ADMIN — MORPHINE SULFATE 30 MG: 15 TABLET ORAL at 22:07

## 2023-01-01 RX ADMIN — SENNOSIDES AND DOCUSATE SODIUM 2 TABLET: 50; 8.6 TABLET ORAL at 08:26

## 2023-01-01 RX ADMIN — Medication 950 MG: at 06:43

## 2023-01-01 RX ADMIN — ACETAMINOPHEN 650 MG: 325 TABLET ORAL at 05:19

## 2023-01-01 RX ADMIN — PREGABALIN 150 MG: 150 CAPSULE ORAL at 15:35

## 2023-01-01 RX ADMIN — METHOCARBAMOL 750 MG: 750 TABLET ORAL at 02:28

## 2023-01-01 RX ADMIN — ACETAMINOPHEN 650 MG: 325 TABLET ORAL at 11:30

## 2023-01-01 RX ADMIN — MORPHINE SULFATE 15 MG: 15 TABLET ORAL at 08:13

## 2023-01-01 RX ADMIN — THIAMINE HCL TAB 100 MG 100 MG: 100 TAB at 05:15

## 2023-01-01 RX ADMIN — FERROUS SULFATE TAB 325 MG (65 MG ELEMENTAL FE) 325 MG: 325 (65 FE) TAB at 18:12

## 2023-01-01 RX ADMIN — SODIUM CHLORIDE, POTASSIUM CHLORIDE, SODIUM LACTATE AND CALCIUM CHLORIDE 500 ML: 600; 310; 30; 20 INJECTION, SOLUTION INTRAVENOUS at 21:47

## 2023-01-01 RX ADMIN — FLUCONAZOLE 200 MG: 200 TABLET ORAL at 05:09

## 2023-01-01 RX ADMIN — MORPHINE SULFATE 15 MG: 15 TABLET, FILM COATED, EXTENDED RELEASE ORAL at 22:46

## 2023-01-01 RX ADMIN — OMEPRAZOLE 40 MG: 20 CAPSULE, DELAYED RELEASE ORAL at 08:45

## 2023-01-01 RX ADMIN — MORPHINE SULFATE 30 MG: 15 TABLET ORAL at 11:11

## 2023-01-01 RX ADMIN — ACETAMINOPHEN 650 MG: 325 TABLET ORAL at 18:16

## 2023-01-01 RX ADMIN — NICOTINE 7 MG: 7 PATCH TRANSDERMAL at 04:34

## 2023-01-01 RX ADMIN — MORPHINE SULFATE 30 MG: 15 TABLET ORAL at 09:30

## 2023-01-01 RX ADMIN — THIAMINE HCL TAB 100 MG 100 MG: 100 TAB at 08:33

## 2023-01-01 RX ADMIN — MORPHINE SULFATE 30 MG: 15 TABLET ORAL at 13:28

## 2023-01-01 RX ADMIN — HEPARIN SODIUM 18 UNITS/KG/HR: 5000 INJECTION, SOLUTION INTRAVENOUS at 22:14

## 2023-01-01 RX ADMIN — MORPHINE SULFATE 30 MG: 30 TABLET, FILM COATED, EXTENDED RELEASE ORAL at 07:50

## 2023-01-01 RX ADMIN — ACETAMINOPHEN 1000 MG: 500 TABLET ORAL at 16:57

## 2023-01-01 RX ADMIN — DULOXETINE HYDROCHLORIDE 60 MG: 30 CAPSULE, DELAYED RELEASE ORAL at 08:35

## 2023-01-01 RX ADMIN — Medication 1 APPLICATOR: at 17:51

## 2023-01-01 RX ADMIN — DEXAMETHASONE SODIUM PHOSPHATE 4 MG: 4 INJECTION, SOLUTION INTRA-ARTICULAR; INTRALESIONAL; INTRAMUSCULAR; INTRAVENOUS; SOFT TISSUE at 17:12

## 2023-01-01 RX ADMIN — FUROSEMIDE 40 MG: 10 INJECTION, SOLUTION INTRAMUSCULAR; INTRAVENOUS at 06:34

## 2023-01-01 RX ADMIN — MORPHINE SULFATE 30 MG: 30 TABLET, FILM COATED, EXTENDED RELEASE ORAL at 06:16

## 2023-01-01 RX ADMIN — SIMETHICONE 125 MG: 125 TABLET, CHEWABLE ORAL at 12:03

## 2023-01-01 RX ADMIN — HYDROMORPHONE HYDROCHLORIDE 1 MG: 1 INJECTION, SOLUTION INTRAMUSCULAR; INTRAVENOUS; SUBCUTANEOUS at 11:26

## 2023-01-01 RX ADMIN — HYDROMORPHONE HYDROCHLORIDE 1 MG: 1 INJECTION, SOLUTION INTRAMUSCULAR; INTRAVENOUS; SUBCUTANEOUS at 21:54

## 2023-01-01 RX ADMIN — SODIUM CHLORIDE, SODIUM LACTATE, POTASSIUM CHLORIDE, CALCIUM CHLORIDE AND DEXTROSE MONOHYDRATE: 5; 600; 310; 30; 20 INJECTION, SOLUTION INTRAVENOUS at 06:41

## 2023-01-01 RX ADMIN — THERA TABS 1 TABLET: TAB at 08:40

## 2023-01-01 RX ADMIN — MICONAZOLE NITRATE: 20 CREAM TOPICAL at 09:07

## 2023-01-01 RX ADMIN — HYDROMORPHONE HYDROCHLORIDE 1 MG: 1 INJECTION, SOLUTION INTRAMUSCULAR; INTRAVENOUS; SUBCUTANEOUS at 17:53

## 2023-01-01 RX ADMIN — SODIUM PHOSPHATE 133 ML: 7; 19 ENEMA RECTAL at 12:01

## 2023-01-01 RX ADMIN — Medication 950 MG: at 08:10

## 2023-01-01 RX ADMIN — OMEPRAZOLE 20 MG: 20 CAPSULE, DELAYED RELEASE ORAL at 05:22

## 2023-01-01 RX ADMIN — PIPERACILLIN AND TAZOBACTAM 4.5 G: 4; .5 INJECTION, POWDER, FOR SOLUTION INTRAVENOUS at 15:41

## 2023-01-01 RX ADMIN — MORPHINE SULFATE 15 MG: 15 TABLET, FILM COATED, EXTENDED RELEASE ORAL at 21:36

## 2023-01-01 RX ADMIN — MORPHINE SULFATE 30 MG: 15 TABLET ORAL at 15:24

## 2023-01-01 RX ADMIN — SODIUM HYPOCHLORITE 10 ML: 1.25 SOLUTION TOPICAL at 05:02

## 2023-01-01 RX ADMIN — MIRTAZAPINE 7.5 MG: 15 TABLET, FILM COATED ORAL at 20:34

## 2023-01-01 RX ADMIN — MORPHINE SULFATE 30 MG: 15 TABLET ORAL at 18:25

## 2023-01-01 RX ADMIN — CEFTRIAXONE SODIUM 2000 MG: 10 INJECTION, POWDER, FOR SOLUTION INTRAVENOUS at 05:33

## 2023-01-01 RX ADMIN — LACTULOSE 30 ML: 20 SOLUTION ORAL at 17:52

## 2023-01-01 RX ADMIN — Medication 1000 UNITS: at 08:47

## 2023-01-01 RX ADMIN — ACETAMINOPHEN 650 MG: 325 TABLET ORAL at 17:40

## 2023-01-01 RX ADMIN — SPIRONOLACTONE 25 MG: 25 TABLET ORAL at 05:09

## 2023-01-01 RX ADMIN — METHOCARBAMOL TABLETS 750 MG: 750 TABLET, COATED ORAL at 11:54

## 2023-01-01 RX ADMIN — HYDROMORPHONE HYDROCHLORIDE 0.5 MG: 1 INJECTION, SOLUTION INTRAMUSCULAR; INTRAVENOUS; SUBCUTANEOUS at 14:30

## 2023-01-01 RX ADMIN — CEFEPIME 2 G: 2 INJECTION, POWDER, FOR SOLUTION INTRAVENOUS at 13:01

## 2023-01-01 RX ADMIN — DAPAGLIFLOZIN 10 MG: 10 TABLET, FILM COATED ORAL at 06:31

## 2023-01-01 RX ADMIN — CEFAZOLIN 2 G: 2 INJECTION, POWDER, FOR SOLUTION INTRAMUSCULAR; INTRAVENOUS at 14:26

## 2023-01-01 RX ADMIN — ACETAMINOPHEN 650 MG: 325 TABLET ORAL at 11:58

## 2023-01-01 RX ADMIN — DULOXETINE HYDROCHLORIDE 60 MG: 30 CAPSULE, DELAYED RELEASE ORAL at 08:55

## 2023-01-01 RX ADMIN — POTASSIUM CHLORIDE 10 MEQ: 7.46 INJECTION, SOLUTION INTRAVENOUS at 19:32

## 2023-01-01 RX ADMIN — PREDNISONE 10 MG: 5 TABLET ORAL at 08:21

## 2023-01-01 RX ADMIN — ACETAMINOPHEN 650 MG: 325 TABLET ORAL at 11:33

## 2023-01-01 RX ADMIN — SODIUM CHLORIDE 250 MG: 9 INJECTION, SOLUTION INTRAVENOUS at 17:06

## 2023-01-01 RX ADMIN — METHOCARBAMOL TABLETS 750 MG: 750 TABLET, COATED ORAL at 15:05

## 2023-01-01 RX ADMIN — MORPHINE SULFATE 30 MG: 15 TABLET ORAL at 12:45

## 2023-01-01 RX ADMIN — MORPHINE SULFATE 15 MG: 15 TABLET, FILM COATED, EXTENDED RELEASE ORAL at 20:44

## 2023-01-01 RX ADMIN — MORPHINE SULFATE 30 MG: 30 TABLET, FILM COATED, EXTENDED RELEASE ORAL at 17:15

## 2023-01-01 RX ADMIN — SIMETHICONE 125 MG: 125 TABLET, CHEWABLE ORAL at 21:31

## 2023-01-01 RX ADMIN — OMEPRAZOLE 20 MG: 20 CAPSULE, DELAYED RELEASE ORAL at 08:53

## 2023-01-01 RX ADMIN — SODIUM CHLORIDE, SODIUM LACTATE, POTASSIUM CHLORIDE, CALCIUM CHLORIDE AND DEXTROSE MONOHYDRATE: 5; 600; 310; 30; 20 INJECTION, SOLUTION INTRAVENOUS at 20:54

## 2023-01-01 RX ADMIN — ENOXAPARIN SODIUM 40 MG: 100 INJECTION SUBCUTANEOUS at 16:41

## 2023-01-01 RX ADMIN — SENNOSIDES AND DOCUSATE SODIUM 2 TABLET: 50; 8.6 TABLET ORAL at 21:36

## 2023-01-01 RX ADMIN — MORPHINE SULFATE 30 MG: 15 TABLET ORAL at 04:47

## 2023-01-01 RX ADMIN — SODIUM CHLORIDE 1 G: 1 TABLET ORAL at 18:07

## 2023-01-01 RX ADMIN — SENNOSIDES AND DOCUSATE SODIUM 2 TABLET: 50; 8.6 TABLET ORAL at 08:29

## 2023-01-01 RX ADMIN — DAPAGLIFLOZIN 10 MG: 10 TABLET, FILM COATED ORAL at 08:56

## 2023-01-01 RX ADMIN — IPRATROPIUM BROMIDE AND ALBUTEROL SULFATE 3 ML: .5; 2.5 SOLUTION RESPIRATORY (INHALATION) at 19:01

## 2023-01-01 RX ADMIN — MORPHINE SULFATE 15 MG: 15 TABLET, FILM COATED, EXTENDED RELEASE ORAL at 08:25

## 2023-01-01 RX ADMIN — SENNOSIDES AND DOCUSATE SODIUM 2 TABLET: 8.6; 5 TABLET ORAL at 08:49

## 2023-01-01 RX ADMIN — CEFEPIME 2 G: 2 INJECTION, POWDER, FOR SOLUTION INTRAVENOUS at 14:57

## 2023-01-01 RX ADMIN — CEFDINIR 300 MG: 300 CAPSULE ORAL at 19:58

## 2023-01-01 RX ADMIN — CEFDINIR 300 MG: 300 CAPSULE ORAL at 08:33

## 2023-01-01 RX ADMIN — FENTANYL CITRATE 50 MCG: 50 INJECTION, SOLUTION INTRAMUSCULAR; INTRAVENOUS at 15:43

## 2023-01-01 RX ADMIN — METHOCARBAMOL TABLETS 750 MG: 750 TABLET, COATED ORAL at 21:27

## 2023-01-01 RX ADMIN — TORSEMIDE 20 MG: 20 TABLET ORAL at 05:23

## 2023-01-01 RX ADMIN — HYDROMORPHONE HYDROCHLORIDE 1 MG: 1 INJECTION, SOLUTION INTRAMUSCULAR; INTRAVENOUS; SUBCUTANEOUS at 03:15

## 2023-01-01 RX ADMIN — MORPHINE SULFATE 30 MG: 15 TABLET ORAL at 22:42

## 2023-01-01 RX ADMIN — PREGABALIN 150 MG: 150 CAPSULE ORAL at 20:45

## 2023-01-01 RX ADMIN — METHOCARBAMOL TABLETS 750 MG: 750 TABLET, COATED ORAL at 12:43

## 2023-01-01 RX ADMIN — ACETAMINOPHEN 650 MG: 325 TABLET ORAL at 05:42

## 2023-01-01 RX ADMIN — OMEPRAZOLE 20 MG: 20 CAPSULE, DELAYED RELEASE ORAL at 05:42

## 2023-01-01 RX ADMIN — ONDANSETRON 4 MG: 2 INJECTION INTRAMUSCULAR; INTRAVENOUS at 17:11

## 2023-01-01 RX ADMIN — VANCOMYCIN HYDROCHLORIDE 2750 MG: 5 INJECTION, POWDER, LYOPHILIZED, FOR SOLUTION INTRAVENOUS at 21:38

## 2023-01-01 RX ADMIN — MORPHINE SULFATE 15 MG: 15 TABLET, FILM COATED, EXTENDED RELEASE ORAL at 05:16

## 2023-01-01 RX ADMIN — Medication 1000 UNITS: at 08:50

## 2023-01-01 RX ADMIN — CEFEPIME 2 G: 2 INJECTION, POWDER, FOR SOLUTION INTRAVENOUS at 13:51

## 2023-01-01 RX ADMIN — ACETAMINOPHEN 1000 MG: 500 TABLET ORAL at 21:50

## 2023-01-01 RX ADMIN — NICOTINE 7 MG: 7 PATCH, EXTENDED RELEASE TRANSDERMAL at 05:20

## 2023-01-01 RX ADMIN — MICONAZOLE NITRATE: 20 CREAM TOPICAL at 17:46

## 2023-01-01 RX ADMIN — MORPHINE SULFATE 30 MG: 15 TABLET ORAL at 00:37

## 2023-01-01 RX ADMIN — KETOCONAZOLE CREAM, 2%: 20 CREAM TOPICAL at 18:06

## 2023-01-01 RX ADMIN — MORPHINE SULFATE 30 MG: 15 TABLET ORAL at 13:22

## 2023-01-01 RX ADMIN — CLOTRIMAZOLE: 10 CREAM TOPICAL at 09:38

## 2023-01-01 RX ADMIN — DULOXETINE HYDROCHLORIDE 60 MG: 30 CAPSULE, DELAYED RELEASE ORAL at 08:08

## 2023-01-01 RX ADMIN — MORPHINE SULFATE 30 MG: 15 TABLET ORAL at 18:55

## 2023-01-01 RX ADMIN — DOCUSATE SODIUM 10 MG: 50 LIQUID ORAL at 17:42

## 2023-01-01 RX ADMIN — PREGABALIN 150 MG: 150 CAPSULE ORAL at 16:57

## 2023-01-01 RX ADMIN — ROPIVACAINE HYDROCHLORIDE 30 ML: 5 INJECTION, SOLUTION EPIDURAL; INFILTRATION; PERINEURAL at 17:41

## 2023-01-01 RX ADMIN — HYDROMORPHONE HYDROCHLORIDE 4 MG: 2 INJECTION, SOLUTION INTRAMUSCULAR; INTRAVENOUS; SUBCUTANEOUS at 19:29

## 2023-01-01 RX ADMIN — CEFAZOLIN 2 G: 2 INJECTION, POWDER, FOR SOLUTION INTRAMUSCULAR; INTRAVENOUS at 05:59

## 2023-01-01 RX ADMIN — HYDROMORPHONE HYDROCHLORIDE 0.5 MG: 1 INJECTION, SOLUTION INTRAMUSCULAR; INTRAVENOUS; SUBCUTANEOUS at 04:37

## 2023-01-01 RX ADMIN — OMEPRAZOLE 40 MG: 20 CAPSULE, DELAYED RELEASE ORAL at 05:16

## 2023-01-01 RX ADMIN — MORPHINE SULFATE 30 MG: 15 TABLET ORAL at 08:56

## 2023-01-01 RX ADMIN — FENTANYL CITRATE 50 MCG: 50 INJECTION, SOLUTION INTRAMUSCULAR; INTRAVENOUS at 09:35

## 2023-01-01 RX ADMIN — APIXABAN 5 MG: 5 TABLET, FILM COATED ORAL at 22:06

## 2023-01-01 RX ADMIN — PREGABALIN 75 MG: 75 CAPSULE ORAL at 18:49

## 2023-01-01 RX ADMIN — ALBUTEROL SULFATE 2.5 MG: 2.5 SOLUTION RESPIRATORY (INHALATION) at 18:49

## 2023-01-01 RX ADMIN — PREGABALIN 150 MG: 150 CAPSULE ORAL at 21:10

## 2023-01-01 RX ADMIN — PREGABALIN 150 MG: 150 CAPSULE ORAL at 08:41

## 2023-01-01 RX ADMIN — SODIUM CHLORIDE: 9 INJECTION, SOLUTION INTRAVENOUS at 16:13

## 2023-01-01 RX ADMIN — PREGABALIN 150 MG: 150 CAPSULE ORAL at 15:22

## 2023-01-01 RX ADMIN — PREGABALIN 200 MG: 100 CAPSULE ORAL at 07:49

## 2023-01-01 RX ADMIN — HEPARIN SODIUM 5000 UNITS: 5000 INJECTION, SOLUTION INTRAVENOUS; SUBCUTANEOUS at 14:16

## 2023-01-01 RX ADMIN — OXYCODONE HYDROCHLORIDE 10 MG: 10 TABLET ORAL at 22:47

## 2023-01-01 RX ADMIN — HEPARIN SODIUM 5000 UNITS: 5000 INJECTION, SOLUTION INTRAVENOUS; SUBCUTANEOUS at 15:15

## 2023-01-01 RX ADMIN — METHOCARBAMOL 750 MG: 750 TABLET ORAL at 19:30

## 2023-01-01 RX ADMIN — MIDAZOLAM HYDROCHLORIDE 2 MG: 1 INJECTION, SOLUTION INTRAMUSCULAR; INTRAVENOUS at 16:14

## 2023-01-01 RX ADMIN — SIMETHICONE 125 MG: 125 TABLET, CHEWABLE ORAL at 21:18

## 2023-01-01 RX ADMIN — MORPHINE SULFATE 30 MG: 15 TABLET ORAL at 03:37

## 2023-01-01 RX ADMIN — CEFAZOLIN 2 G: 2 INJECTION, POWDER, FOR SOLUTION INTRAMUSCULAR; INTRAVENOUS at 15:41

## 2023-01-01 RX ADMIN — OMEPRAZOLE 40 MG: 20 CAPSULE, DELAYED RELEASE ORAL at 05:26

## 2023-01-01 RX ADMIN — LACTULOSE 30 ML: 20 SOLUTION ORAL at 17:16

## 2023-01-01 RX ADMIN — SIMETHICONE 125 MG: 125 TABLET, CHEWABLE ORAL at 15:10

## 2023-01-01 RX ADMIN — MORPHINE SULFATE 30 MG: 15 TABLET ORAL at 16:46

## 2023-01-01 RX ADMIN — ACETAMINOPHEN 650 MG: 325 TABLET, FILM COATED ORAL at 17:01

## 2023-01-01 RX ADMIN — CEFEPIME 2 G: 2 INJECTION, POWDER, FOR SOLUTION INTRAVENOUS at 15:04

## 2023-01-01 RX ADMIN — OXYCODONE HYDROCHLORIDE 15 MG: 15 TABLET ORAL at 15:32

## 2023-01-01 RX ADMIN — SODIUM CHLORIDE, PRESERVATIVE FREE 20 ML: 5 INJECTION INTRAVENOUS at 08:45

## 2023-01-01 RX ADMIN — MORPHINE SULFATE 30 MG: 15 TABLET ORAL at 17:03

## 2023-01-01 RX ADMIN — DULOXETINE HYDROCHLORIDE 60 MG: 30 CAPSULE, DELAYED RELEASE ORAL at 05:27

## 2023-01-01 RX ADMIN — Medication 950 MG: at 05:15

## 2023-01-01 RX ADMIN — MORPHINE SULFATE 15 MG: 15 TABLET, FILM COATED, EXTENDED RELEASE ORAL at 21:50

## 2023-01-01 RX ADMIN — MORPHINE SULFATE 30 MG: 15 TABLET ORAL at 04:49

## 2023-01-01 RX ADMIN — ACETAMINOPHEN 650 MG: 325 TABLET ORAL at 17:50

## 2023-01-01 RX ADMIN — POTASSIUM PHOSPHATE, MONOBASIC AND POTASSIUM PHOSPHATE, DIBASIC 30 MMOL: 224; 236 INJECTION, SOLUTION, CONCENTRATE INTRAVENOUS at 09:49

## 2023-01-01 RX ADMIN — Medication 1 TABLET: at 10:59

## 2023-01-01 RX ADMIN — SENNOSIDES AND DOCUSATE SODIUM 2 TABLET: 50; 8.6 TABLET ORAL at 09:08

## 2023-01-01 RX ADMIN — OXYCODONE HYDROCHLORIDE 10 MG: 10 TABLET ORAL at 20:47

## 2023-01-01 RX ADMIN — MORPHINE SULFATE 30 MG: 15 TABLET ORAL at 16:57

## 2023-01-01 RX ADMIN — FLUCONAZOLE 200 MG: 100 TABLET ORAL at 08:12

## 2023-01-01 RX ADMIN — ACETAMINOPHEN 1000 MG: 500 TABLET ORAL at 14:08

## 2023-01-01 RX ADMIN — HYDROMORPHONE HYDROCHLORIDE 1 MG: 2 INJECTION INTRAMUSCULAR; INTRAVENOUS; SUBCUTANEOUS at 08:44

## 2023-01-01 RX ADMIN — Medication 200 MCG: at 15:56

## 2023-01-01 RX ADMIN — PREGABALIN 150 MG: 150 CAPSULE ORAL at 14:08

## 2023-01-01 RX ADMIN — HYDROMORPHONE HYDROCHLORIDE 0.5 MG: 1 INJECTION, SOLUTION INTRAMUSCULAR; INTRAVENOUS; SUBCUTANEOUS at 11:02

## 2023-01-01 RX ADMIN — PREGABALIN 150 MG: 150 CAPSULE ORAL at 16:07

## 2023-01-01 RX ADMIN — MIDAZOLAM 2 MG: 1 INJECTION, SOLUTION INTRAMUSCULAR; INTRAVENOUS at 08:39

## 2023-01-01 RX ADMIN — ROCURONIUM BROMIDE 50 MG: 50 INJECTION, SOLUTION INTRAVENOUS at 14:35

## 2023-01-01 RX ADMIN — ACETAMINOPHEN 650 MG: 325 TABLET, FILM COATED ORAL at 17:46

## 2023-01-01 RX ADMIN — SODIUM CHLORIDE, PRESERVATIVE FREE 20 ML: 5 INJECTION INTRAVENOUS at 20:50

## 2023-01-01 RX ADMIN — ZOLPIDEM TARTRATE 5 MG: 5 TABLET ORAL at 23:06

## 2023-01-01 RX ADMIN — FLUCONAZOLE 200 MG: 100 TABLET ORAL at 08:20

## 2023-01-01 RX ADMIN — CEFEPIME 2 G: 2 INJECTION, POWDER, FOR SOLUTION INTRAVENOUS at 05:08

## 2023-01-01 RX ADMIN — MORPHINE SULFATE 30 MG: 30 TABLET, FILM COATED, EXTENDED RELEASE ORAL at 18:12

## 2023-01-01 RX ADMIN — MORPHINE SULFATE 30 MG: 15 TABLET ORAL at 09:49

## 2023-01-01 RX ADMIN — ACETAMINOPHEN 1000 MG: 500 TABLET ORAL at 20:56

## 2023-01-01 RX ADMIN — MORPHINE SULFATE 30 MG: 15 TABLET ORAL at 13:36

## 2023-01-01 RX ADMIN — ACETAMINOPHEN 650 MG: 325 TABLET ORAL at 05:29

## 2023-01-01 RX ADMIN — SENNOSIDES AND DOCUSATE SODIUM 2 TABLET: 50; 8.6 TABLET ORAL at 20:28

## 2023-01-01 RX ADMIN — OXYCODONE HYDROCHLORIDE 15 MG: 15 TABLET ORAL at 23:42

## 2023-01-01 RX ADMIN — PREGABALIN 100 MG: 100 CAPSULE ORAL at 08:05

## 2023-01-01 RX ADMIN — OXYCODONE HYDROCHLORIDE 15 MG: 15 TABLET ORAL at 15:00

## 2023-01-01 RX ADMIN — MORPHINE SULFATE 30 MG: 30 TABLET, FILM COATED, EXTENDED RELEASE ORAL at 20:55

## 2023-01-01 RX ADMIN — KETOCONAZOLE CREAM, 2%: 20 CREAM TOPICAL at 18:09

## 2023-01-01 RX ADMIN — SODIUM CHLORIDE, SODIUM LACTATE, POTASSIUM CHLORIDE, CALCIUM CHLORIDE AND DEXTROSE MONOHYDRATE: 5; 600; 310; 30; 20 INJECTION, SOLUTION INTRAVENOUS at 12:29

## 2023-01-01 RX ADMIN — MORPHINE SULFATE 15 MG: 15 TABLET, FILM COATED, EXTENDED RELEASE ORAL at 08:49

## 2023-01-01 RX ADMIN — FERROUS SULFATE TAB 325 MG (65 MG ELEMENTAL FE) 325 MG: 325 (65 FE) TAB at 13:09

## 2023-01-01 RX ADMIN — MAGNESIUM HYDROXIDE 30 ML: 400 SUSPENSION ORAL at 17:35

## 2023-01-01 RX ADMIN — SIMETHICONE 125 MG: 125 TABLET, CHEWABLE ORAL at 08:55

## 2023-01-01 RX ADMIN — PREGABALIN 200 MG: 100 CAPSULE ORAL at 20:55

## 2023-01-01 RX ADMIN — METHOCARBAMOL TABLETS 750 MG: 750 TABLET, COATED ORAL at 09:36

## 2023-01-01 RX ADMIN — DEXAMETHASONE SODIUM PHOSPHATE 8 MG: 4 INJECTION, SOLUTION INTRA-ARTICULAR; INTRALESIONAL; INTRAMUSCULAR; INTRAVENOUS; SOFT TISSUE at 16:31

## 2023-01-01 RX ADMIN — MORPHINE SULFATE 30 MG: 15 TABLET ORAL at 08:57

## 2023-01-01 RX ADMIN — MAGNESIUM HYDROXIDE 30 ML: 1200 LIQUID ORAL at 05:16

## 2023-01-01 RX ADMIN — DULOXETINE HYDROCHLORIDE 60 MG: 60 CAPSULE, DELAYED RELEASE ORAL at 06:31

## 2023-01-01 RX ADMIN — ACETAMINOPHEN 650 MG: 325 TABLET, FILM COATED ORAL at 05:18

## 2023-01-01 RX ADMIN — SODIUM CHLORIDE, PRESERVATIVE FREE 20 ML: 5 INJECTION INTRAVENOUS at 22:19

## 2023-01-01 RX ADMIN — AMIODARONE HYDROCHLORIDE 400 MG: 200 TABLET ORAL at 18:35

## 2023-01-01 RX ADMIN — MICONAZOLE NITRATE: 20 CREAM TOPICAL at 21:00

## 2023-01-01 RX ADMIN — HYDROMORPHONE HYDROCHLORIDE 2 MG: 2 INJECTION INTRAMUSCULAR; INTRAVENOUS; SUBCUTANEOUS at 14:19

## 2023-01-01 RX ADMIN — MORPHINE SULFATE 30 MG: 15 TABLET ORAL at 21:53

## 2023-01-01 RX ADMIN — GABAPENTIN 300 MG: 300 CAPSULE ORAL at 20:40

## 2023-01-01 RX ADMIN — MORPHINE SULFATE 30 MG: 15 TABLET ORAL at 13:02

## 2023-01-01 RX ADMIN — Medication 1000 UNITS: at 08:12

## 2023-01-01 RX ADMIN — DOCUSATE SODIUM 50 MG AND SENNOSIDES 8.6 MG 2 TABLET: 8.6; 5 TABLET, FILM COATED ORAL at 04:54

## 2023-01-01 RX ADMIN — ZOLPIDEM TARTRATE 5 MG: 5 TABLET ORAL at 22:27

## 2023-01-01 RX ADMIN — MICONAZOLE NITRATE: 20 CREAM TOPICAL at 17:39

## 2023-01-01 RX ADMIN — OMEPRAZOLE 20 MG: 20 CAPSULE, DELAYED RELEASE ORAL at 08:43

## 2023-01-01 RX ADMIN — CEFAZOLIN 2 G: 2 INJECTION, POWDER, FOR SOLUTION INTRAMUSCULAR; INTRAVENOUS at 06:29

## 2023-01-01 RX ADMIN — HYDROMORPHONE HYDROCHLORIDE 1 MG: 1 INJECTION, SOLUTION INTRAMUSCULAR; INTRAVENOUS; SUBCUTANEOUS at 01:47

## 2023-01-01 RX ADMIN — PREGABALIN 200 MG: 100 CAPSULE ORAL at 21:11

## 2023-01-01 RX ADMIN — ACETAMINOPHEN 650 MG: 325 TABLET ORAL at 06:12

## 2023-01-01 RX ADMIN — METHOCARBAMOL TABLETS 750 MG: 750 TABLET, COATED ORAL at 03:34

## 2023-01-01 RX ADMIN — PREGABALIN 150 MG: 150 CAPSULE ORAL at 22:47

## 2023-01-01 RX ADMIN — MORPHINE SULFATE 15 MG: 15 TABLET, FILM COATED, EXTENDED RELEASE ORAL at 17:04

## 2023-01-01 RX ADMIN — SODIUM CHLORIDE, POTASSIUM CHLORIDE, SODIUM LACTATE AND CALCIUM CHLORIDE 500 ML: 600; 310; 30; 20 INJECTION, SOLUTION INTRAVENOUS at 14:44

## 2023-01-01 RX ADMIN — OXYCODONE HYDROCHLORIDE 15 MG: 15 TABLET ORAL at 05:22

## 2023-01-01 RX ADMIN — MORPHINE SULFATE 15 MG: 15 TABLET, FILM COATED, EXTENDED RELEASE ORAL at 08:06

## 2023-01-01 RX ADMIN — SENNOSIDES AND DOCUSATE SODIUM 2 TABLET: 50; 8.6 TABLET ORAL at 08:14

## 2023-01-01 RX ADMIN — KETOCONAZOLE CREAM, 2%: 20 CREAM TOPICAL at 06:04

## 2023-01-01 RX ADMIN — MORPHINE SULFATE 15 MG: 15 TABLET ORAL at 09:43

## 2023-01-01 RX ADMIN — AMIODARONE HYDROCHLORIDE 400 MG: 200 TABLET ORAL at 05:28

## 2023-01-01 RX ADMIN — SODIUM HYPOCHLORITE 3 ML: 1.25 SOLUTION TOPICAL at 07:08

## 2023-01-01 RX ADMIN — HYDROMORPHONE HYDROCHLORIDE 1 MG: 1 INJECTION, SOLUTION INTRAMUSCULAR; INTRAVENOUS; SUBCUTANEOUS at 09:57

## 2023-01-01 RX ADMIN — Medication 1 APPLICATOR: at 05:29

## 2023-01-01 RX ADMIN — DULOXETINE HYDROCHLORIDE 60 MG: 60 CAPSULE, DELAYED RELEASE ORAL at 06:26

## 2023-01-01 RX ADMIN — SENNOSIDES AND DOCUSATE SODIUM 2 TABLET: 50; 8.6 TABLET ORAL at 08:10

## 2023-01-01 RX ADMIN — MORPHINE SULFATE 30 MG: 30 TABLET, FILM COATED, EXTENDED RELEASE ORAL at 21:31

## 2023-01-01 RX ADMIN — MICONAZOLE NITRATE: 20 CREAM TOPICAL at 23:07

## 2023-01-01 RX ADMIN — SIMETHICONE 125 MG: 125 TABLET, CHEWABLE ORAL at 08:39

## 2023-01-01 RX ADMIN — ENOXAPARIN SODIUM 40 MG: 100 INJECTION SUBCUTANEOUS at 17:12

## 2023-01-01 RX ADMIN — SENNOSIDES AND DOCUSATE SODIUM 2 TABLET: 8.6; 5 TABLET ORAL at 09:00

## 2023-01-01 RX ADMIN — ACETAMINOPHEN 1000 MG: 500 TABLET ORAL at 16:30

## 2023-01-01 RX ADMIN — PREGABALIN 150 MG: 150 CAPSULE ORAL at 20:48

## 2023-01-01 RX ADMIN — MIDODRINE HYDROCHLORIDE 15 MG: 5 TABLET ORAL at 11:11

## 2023-01-01 RX ADMIN — DOCUSATE SODIUM 50 MG AND SENNOSIDES 8.6 MG 2 TABLET: 8.6; 5 TABLET, FILM COATED ORAL at 06:12

## 2023-01-01 RX ADMIN — AMIODARONE HYDROCHLORIDE 400 MG: 200 TABLET ORAL at 17:52

## 2023-01-01 RX ADMIN — DAPAGLIFLOZIN 10 MG: 10 TABLET, FILM COATED ORAL at 08:26

## 2023-01-01 RX ADMIN — PREGABALIN 150 MG: 150 CAPSULE ORAL at 08:10

## 2023-01-01 RX ADMIN — HYDROCORTISONE: 1 CREAM TOPICAL at 14:41

## 2023-01-01 RX ADMIN — LINEZOLID 600 MG: 600 TABLET, FILM COATED ORAL at 08:44

## 2023-01-01 RX ADMIN — ACETAMINOPHEN 1000 MG: 500 TABLET ORAL at 08:56

## 2023-01-01 RX ADMIN — GABAPENTIN 300 MG: 300 CAPSULE ORAL at 07:49

## 2023-01-01 RX ADMIN — POTASSIUM CHLORIDE 40 MEQ: 1500 TABLET, EXTENDED RELEASE ORAL at 05:26

## 2023-01-01 RX ADMIN — SENNOSIDES AND DOCUSATE SODIUM 2 TABLET: 8.6; 5 TABLET ORAL at 09:44

## 2023-01-01 RX ADMIN — MICONAZOLE NITRATE: 20 CREAM TOPICAL at 08:50

## 2023-01-01 RX ADMIN — METHOCARBAMOL 750 MG: 750 TABLET ORAL at 20:16

## 2023-01-01 RX ADMIN — LACTULOSE 30 ML: 20 SOLUTION ORAL at 06:30

## 2023-01-01 RX ADMIN — METHOCARBAMOL 750 MG: 750 TABLET ORAL at 03:31

## 2023-01-01 RX ADMIN — SODIUM CHLORIDE 500 ML: 9 INJECTION, SOLUTION INTRAVENOUS at 20:29

## 2023-01-01 RX ADMIN — PREDNISONE 10 MG: 5 TABLET ORAL at 20:42

## 2023-01-01 RX ADMIN — FUROSEMIDE 20 MG: 20 TABLET ORAL at 16:58

## 2023-01-01 RX ADMIN — MORPHINE SULFATE 30 MG: 15 TABLET ORAL at 16:07

## 2023-01-01 RX ADMIN — MORPHINE SULFATE 30 MG: 15 TABLET ORAL at 08:32

## 2023-01-01 RX ADMIN — CEFEPIME 2 G: 2 INJECTION, POWDER, FOR SOLUTION INTRAVENOUS at 05:18

## 2023-01-01 RX ADMIN — MORPHINE SULFATE 30 MG: 15 TABLET ORAL at 09:22

## 2023-01-01 RX ADMIN — HYDROMORPHONE HYDROCHLORIDE 0.5 MG: 1 INJECTION, SOLUTION INTRAMUSCULAR; INTRAVENOUS; SUBCUTANEOUS at 02:30

## 2023-01-01 RX ADMIN — PREDNISONE 10 MG: 5 TABLET ORAL at 13:36

## 2023-01-01 RX ADMIN — CEFDINIR 300 MG: 300 CAPSULE ORAL at 08:31

## 2023-01-01 RX ADMIN — ACETAMINOPHEN 1000 MG: 500 TABLET ORAL at 08:36

## 2023-01-01 RX ADMIN — PREGABALIN 200 MG: 100 CAPSULE ORAL at 08:34

## 2023-01-01 RX ADMIN — MORPHINE SULFATE 15 MG: 15 TABLET, FILM COATED, EXTENDED RELEASE ORAL at 04:55

## 2023-01-01 RX ADMIN — FUROSEMIDE 80 MG: 10 INJECTION, SOLUTION INTRAMUSCULAR; INTRAVENOUS at 15:03

## 2023-01-01 RX ADMIN — ACETAMINOPHEN 650 MG: 325 TABLET, FILM COATED ORAL at 17:55

## 2023-01-01 RX ADMIN — SENNOSIDES AND DOCUSATE SODIUM 2 TABLET: 50; 8.6 TABLET ORAL at 08:41

## 2023-01-01 RX ADMIN — MAGNESIUM HYDROXIDE 30 ML: 1200 LIQUID ORAL at 23:11

## 2023-01-01 RX ADMIN — MORPHINE SULFATE 30 MG: 15 TABLET ORAL at 00:01

## 2023-01-01 RX ADMIN — CEFTRIAXONE SODIUM 2000 MG: 10 INJECTION, POWDER, FOR SOLUTION INTRAVENOUS at 05:54

## 2023-01-01 RX ADMIN — NICOTINE 7 MG: 7 PATCH TRANSDERMAL at 06:00

## 2023-01-01 RX ADMIN — THIAMINE HCL TAB 100 MG 100 MG: 100 TAB at 08:26

## 2023-01-01 RX ADMIN — MORPHINE SULFATE 30 MG: 15 TABLET ORAL at 13:18

## 2023-01-01 RX ADMIN — MORPHINE SULFATE 30 MG: 15 TABLET ORAL at 20:57

## 2023-01-01 RX ADMIN — LINEZOLID 600 MG: 600 INJECTION, SOLUTION INTRAVENOUS at 05:34

## 2023-01-01 RX ADMIN — MORPHINE SULFATE 15 MG: 15 TABLET, FILM COATED, EXTENDED RELEASE ORAL at 21:41

## 2023-01-01 RX ADMIN — OXYCODONE HYDROCHLORIDE 10 MG: 5 SOLUTION ORAL at 10:25

## 2023-01-01 RX ADMIN — MORPHINE SULFATE 30 MG: 15 TABLET ORAL at 17:10

## 2023-01-01 RX ADMIN — MORPHINE SULFATE 30 MG: 15 TABLET ORAL at 17:47

## 2023-01-01 RX ADMIN — MORPHINE SULFATE 30 MG: 15 TABLET ORAL at 18:10

## 2023-01-01 RX ADMIN — PREGABALIN 200 MG: 100 CAPSULE ORAL at 21:19

## 2023-01-01 RX ADMIN — PREGABALIN 150 MG: 150 CAPSULE ORAL at 16:12

## 2023-01-01 RX ADMIN — OXYCODONE HYDROCHLORIDE 10 MG: 10 TABLET ORAL at 20:24

## 2023-01-01 RX ADMIN — Medication 1 APPLICATOR: at 04:54

## 2023-01-01 RX ADMIN — POTASSIUM CHLORIDE 40 MEQ: 1500 TABLET, EXTENDED RELEASE ORAL at 08:23

## 2023-01-01 RX ADMIN — POTASSIUM CHLORIDE 20 MEQ: 1500 TABLET, EXTENDED RELEASE ORAL at 17:04

## 2023-01-01 RX ADMIN — NICOTINE 7 MG: 7 PATCH TRANSDERMAL at 06:39

## 2023-01-01 RX ADMIN — NICOTINE 7 MG: 7 PATCH TRANSDERMAL at 05:32

## 2023-01-01 RX ADMIN — PREGABALIN 200 MG: 100 CAPSULE ORAL at 16:14

## 2023-01-01 RX ADMIN — MAGNESIUM HYDROXIDE 30 ML: 1200 LIQUID ORAL at 05:34

## 2023-01-01 RX ADMIN — DULOXETINE HYDROCHLORIDE 60 MG: 30 CAPSULE, DELAYED RELEASE ORAL at 08:10

## 2023-01-01 RX ADMIN — Medication 3 MG: at 21:39

## 2023-01-01 RX ADMIN — MORPHINE SULFATE 15 MG: 15 TABLET, FILM COATED, EXTENDED RELEASE ORAL at 18:37

## 2023-01-01 RX ADMIN — SODIUM CHLORIDE 1 G: 1 TABLET ORAL at 08:55

## 2023-01-01 RX ADMIN — SENNOSIDES AND DOCUSATE SODIUM 2 TABLET: 50; 8.6 TABLET ORAL at 21:44

## 2023-01-01 RX ADMIN — ZOLPIDEM TARTRATE 10 MG: 5 TABLET ORAL at 00:16

## 2023-01-01 RX ADMIN — MORPHINE SULFATE 30 MG: 15 TABLET ORAL at 08:55

## 2023-01-01 RX ADMIN — PREGABALIN 150 MG: 150 CAPSULE ORAL at 21:44

## 2023-01-01 RX ADMIN — HYDROMORPHONE HYDROCHLORIDE 1 MG: 1 INJECTION, SOLUTION INTRAMUSCULAR; INTRAVENOUS; SUBCUTANEOUS at 02:23

## 2023-01-01 RX ADMIN — KETOCONAZOLE CREAM, 2%: 20 CREAM TOPICAL at 00:25

## 2023-01-01 RX ADMIN — MORPHINE SULFATE 30 MG: 15 TABLET ORAL at 06:44

## 2023-01-01 RX ADMIN — HYDROMORPHONE HYDROCHLORIDE 0.5 MG: 1 INJECTION, SOLUTION INTRAMUSCULAR; INTRAVENOUS; SUBCUTANEOUS at 18:53

## 2023-01-01 RX ADMIN — MORPHINE SULFATE 30 MG: 30 TABLET, FILM COATED, EXTENDED RELEASE ORAL at 21:11

## 2023-01-01 RX ADMIN — DEXTROSE MONOHYDRATE 25 G: 100 INJECTION, SOLUTION INTRAVENOUS at 15:30

## 2023-01-01 RX ADMIN — MORPHINE SULFATE 30 MG: 30 TABLET, FILM COATED, EXTENDED RELEASE ORAL at 17:28

## 2023-01-01 RX ADMIN — CEFTRIAXONE SODIUM 2000 MG: 10 INJECTION, POWDER, FOR SOLUTION INTRAVENOUS at 05:14

## 2023-01-01 RX ADMIN — MAGNESIUM HYDROXIDE 30 ML: 400 SUSPENSION ORAL at 05:29

## 2023-01-01 RX ADMIN — SODIUM HYPOCHLORITE 2 ML: 1.25 SOLUTION TOPICAL at 09:00

## 2023-01-01 RX ADMIN — ACETAMINOPHEN 650 MG: 325 TABLET, FILM COATED ORAL at 17:41

## 2023-01-01 RX ADMIN — HYDROMORPHONE HYDROCHLORIDE 2 MG: 2 INJECTION, SOLUTION INTRAMUSCULAR; INTRAVENOUS; SUBCUTANEOUS at 17:49

## 2023-01-01 RX ADMIN — DOCUSATE SODIUM 50 MG AND SENNOSIDES 8.6 MG 2 TABLET: 8.6; 5 TABLET, FILM COATED ORAL at 17:45

## 2023-01-01 RX ADMIN — PREDNISONE 10 MG: 5 TABLET ORAL at 08:26

## 2023-01-01 RX ADMIN — HYDROMORPHONE HYDROCHLORIDE 1 MG: 1 INJECTION, SOLUTION INTRAMUSCULAR; INTRAVENOUS; SUBCUTANEOUS at 20:07

## 2023-01-01 RX ADMIN — DULOXETINE HYDROCHLORIDE 60 MG: 30 CAPSULE, DELAYED RELEASE ORAL at 08:23

## 2023-01-01 RX ADMIN — DOCUSATE SODIUM 50 MG AND SENNOSIDES 8.6 MG 2 TABLET: 8.6; 5 TABLET, FILM COATED ORAL at 17:01

## 2023-01-01 RX ADMIN — HYDROMORPHONE HYDROCHLORIDE 0.5 MG: 1 INJECTION, SOLUTION INTRAMUSCULAR; INTRAVENOUS; SUBCUTANEOUS at 20:06

## 2023-01-01 RX ADMIN — PREGABALIN 150 MG: 150 CAPSULE ORAL at 13:35

## 2023-01-01 RX ADMIN — DEXMEDETOMIDINE 50 MCG: 100 INJECTION, SOLUTION INTRAVENOUS at 08:55

## 2023-01-01 RX ADMIN — SENNOSIDES AND DOCUSATE SODIUM 2 TABLET: 50; 8.6 TABLET ORAL at 08:35

## 2023-01-01 RX ADMIN — OXYCODONE HYDROCHLORIDE 10 MG: 10 TABLET ORAL at 05:56

## 2023-01-01 RX ADMIN — MORPHINE SULFATE 30 MG: 15 TABLET ORAL at 00:32

## 2023-01-01 RX ADMIN — INFLUENZA A VIRUS A/VICTORIA/4897/2022 IVR-238 (H1N1) ANTIGEN (FORMALDEHYDE INACTIVATED), INFLUENZA A VIRUS A/DARWIN/9/2021 SAN-010 (H3N2) ANTIGEN (FORMALDEHYDE INACTIVATED), INFLUENZA B VIRUS B/PHUKET/3073/2013 ANTIGEN (FORMALDEHYDE INACTIVATED), AND INFLUENZA B VIRUS B/MICHIGAN/01/2021 ANTIGEN (FORMALDEHYDE INACTIVATED) 0.5 ML: 15; 15; 15; 15 INJECTION, SUSPENSION INTRAMUSCULAR at 06:31

## 2023-01-01 RX ADMIN — MIDODRINE HYDROCHLORIDE 10 MG: 5 TABLET ORAL at 03:03

## 2023-01-01 RX ADMIN — PREGABALIN 150 MG: 150 CAPSULE ORAL at 14:18

## 2023-01-01 RX ADMIN — HYDROCORTISONE: 1 CREAM TOPICAL at 21:14

## 2023-01-01 RX ADMIN — HEPARIN SODIUM 5000 UNITS: 5000 INJECTION, SOLUTION INTRAVENOUS; SUBCUTANEOUS at 05:09

## 2023-01-01 RX ADMIN — PREGABALIN 100 MG: 100 CAPSULE ORAL at 08:56

## 2023-01-01 RX ADMIN — OMEPRAZOLE 40 MG: 20 CAPSULE, DELAYED RELEASE ORAL at 08:40

## 2023-01-01 RX ADMIN — PREGABALIN 150 MG: 150 CAPSULE ORAL at 15:25

## 2023-01-01 RX ADMIN — MORPHINE SULFATE 30 MG: 15 TABLET ORAL at 12:01

## 2023-01-01 RX ADMIN — MORPHINE SULFATE 30 MG: 15 TABLET ORAL at 17:46

## 2023-01-01 RX ADMIN — DULOXETINE HYDROCHLORIDE 60 MG: 60 CAPSULE, DELAYED RELEASE ORAL at 04:54

## 2023-01-01 RX ADMIN — VANCOMYCIN HYDROCHLORIDE 2250 MG: 500 INJECTION, POWDER, LYOPHILIZED, FOR SOLUTION INTRAVENOUS at 12:57

## 2023-01-01 RX ADMIN — MORPHINE SULFATE 30 MG: 15 TABLET ORAL at 21:39

## 2023-01-01 RX ADMIN — POLYETHYLENE GLYCOL 3350 1 PACKET: 17 POWDER, FOR SOLUTION ORAL at 05:32

## 2023-01-01 RX ADMIN — MORPHINE SULFATE 30 MG: 15 TABLET ORAL at 01:09

## 2023-01-01 RX ADMIN — MORPHINE SULFATE 30 MG: 15 TABLET ORAL at 01:13

## 2023-01-01 RX ADMIN — GABAPENTIN 300 MG: 300 CAPSULE ORAL at 17:26

## 2023-01-01 RX ADMIN — MORPHINE SULFATE 30 MG: 15 TABLET ORAL at 16:52

## 2023-01-01 RX ADMIN — HYDROMORPHONE HYDROCHLORIDE 0.5 MG: 1 INJECTION, SOLUTION INTRAMUSCULAR; INTRAVENOUS; SUBCUTANEOUS at 19:43

## 2023-01-01 RX ADMIN — METHOCARBAMOL TABLETS 750 MG: 750 TABLET, COATED ORAL at 21:41

## 2023-01-01 RX ADMIN — MORPHINE SULFATE 30 MG: 15 TABLET ORAL at 11:47

## 2023-01-01 RX ADMIN — Medication 950 MG: at 08:20

## 2023-01-01 RX ADMIN — FUROSEMIDE 20 MG: 10 INJECTION, SOLUTION INTRAVENOUS at 03:33

## 2023-01-01 RX ADMIN — DOCUSATE SODIUM 50 MG AND SENNOSIDES 8.6 MG 2 TABLET: 8.6; 5 TABLET, FILM COATED ORAL at 06:30

## 2023-01-01 RX ADMIN — HYDROMORPHONE HYDROCHLORIDE 0.5 MG: 1 INJECTION, SOLUTION INTRAMUSCULAR; INTRAVENOUS; SUBCUTANEOUS at 03:26

## 2023-01-01 RX ADMIN — ACETAMINOPHEN 650 MG: 325 TABLET ORAL at 17:27

## 2023-01-01 RX ADMIN — OMEPRAZOLE 40 MG: 20 CAPSULE, DELAYED RELEASE ORAL at 05:19

## 2023-01-01 RX ADMIN — POTASSIUM CHLORIDE 20 MEQ: 1500 TABLET, EXTENDED RELEASE ORAL at 05:18

## 2023-01-01 RX ADMIN — MORPHINE SULFATE 30 MG: 15 TABLET ORAL at 23:42

## 2023-01-01 RX ADMIN — SENNOSIDES AND DOCUSATE SODIUM 2 TABLET: 50; 8.6 TABLET ORAL at 17:38

## 2023-01-01 RX ADMIN — FUROSEMIDE 40 MG: 10 INJECTION, SOLUTION INTRAVENOUS at 05:51

## 2023-01-01 RX ADMIN — ACETAMINOPHEN 1000 MG: 500 TABLET ORAL at 17:46

## 2023-01-01 RX ADMIN — SENNOSIDES AND DOCUSATE SODIUM 2 TABLET: 50; 8.6 TABLET ORAL at 08:45

## 2023-01-01 RX ADMIN — ACETAMINOPHEN 650 MG: 325 TABLET, FILM COATED ORAL at 01:06

## 2023-01-01 RX ADMIN — MORPHINE SULFATE 30 MG: 15 TABLET ORAL at 17:39

## 2023-01-01 RX ADMIN — PREGABALIN 200 MG: 100 CAPSULE ORAL at 14:05

## 2023-01-01 RX ADMIN — KETOCONAZOLE CREAM, 2%: 20 CREAM TOPICAL at 18:15

## 2023-01-01 RX ADMIN — FUROSEMIDE 20 MG: 20 TABLET ORAL at 05:44

## 2023-01-01 RX ADMIN — MORPHINE SULFATE 30 MG: 15 TABLET ORAL at 16:51

## 2023-01-01 RX ADMIN — Medication 2000 UNITS: at 08:25

## 2023-01-01 RX ADMIN — FENTANYL CITRATE 100 MCG: 50 INJECTION, SOLUTION INTRAMUSCULAR; INTRAVENOUS at 16:36

## 2023-01-01 RX ADMIN — BISACODYL 10 MG: 10 SUPPOSITORY RECTAL at 13:29

## 2023-01-01 RX ADMIN — PREGABALIN 200 MG: 100 CAPSULE ORAL at 19:57

## 2023-01-01 RX ADMIN — EPHEDRINE SULFATE 10 MG: 50 INJECTION, SOLUTION INTRAVENOUS at 16:53

## 2023-01-01 RX ADMIN — OMEPRAZOLE 20 MG: 20 CAPSULE, DELAYED RELEASE ORAL at 08:35

## 2023-01-01 RX ADMIN — CEFAZOLIN 2 G: 2 INJECTION, POWDER, FOR SOLUTION INTRAMUSCULAR; INTRAVENOUS at 21:31

## 2023-01-01 RX ADMIN — NICOTINE 7 MG: 7 PATCH TRANSDERMAL at 06:42

## 2023-01-01 RX ADMIN — SIMETHICONE 125 MG: 125 TABLET, CHEWABLE ORAL at 11:08

## 2023-01-01 RX ADMIN — MORPHINE SULFATE 30 MG: 15 TABLET ORAL at 12:58

## 2023-01-01 RX ADMIN — METHOCARBAMOL 750 MG: 750 TABLET ORAL at 11:05

## 2023-01-01 RX ADMIN — DAPAGLIFLOZIN 10 MG: 10 TABLET, FILM COATED ORAL at 05:18

## 2023-01-01 RX ADMIN — ACETAMINOPHEN 1000 MG: 500 TABLET, FILM COATED ORAL at 06:22

## 2023-01-01 RX ADMIN — CEFAZOLIN 2 G: 2 INJECTION, POWDER, FOR SOLUTION INTRAMUSCULAR; INTRAVENOUS at 22:01

## 2023-01-01 RX ADMIN — Medication 950 MG: at 05:59

## 2023-01-01 RX ADMIN — ACETAMINOPHEN 650 MG: 325 TABLET ORAL at 11:42

## 2023-01-01 RX ADMIN — PREGABALIN 100 MG: 100 CAPSULE ORAL at 21:36

## 2023-01-01 RX ADMIN — HEPARIN SODIUM 5000 UNITS: 5000 INJECTION, SOLUTION INTRAVENOUS; SUBCUTANEOUS at 15:56

## 2023-01-01 RX ADMIN — MORPHINE SULFATE 30 MG: 30 TABLET, FILM COATED, EXTENDED RELEASE ORAL at 21:39

## 2023-01-01 RX ADMIN — PREGABALIN 150 MG: 150 CAPSULE ORAL at 15:32

## 2023-01-01 RX ADMIN — OMEPRAZOLE 40 MG: 20 CAPSULE, DELAYED RELEASE ORAL at 08:33

## 2023-01-01 RX ADMIN — ACETAMINOPHEN 1000 MG: 500 TABLET, FILM COATED ORAL at 17:51

## 2023-01-01 RX ADMIN — ACETAMINOPHEN 650 MG: 325 TABLET ORAL at 05:52

## 2023-01-01 RX ADMIN — ZOLPIDEM TARTRATE 5 MG: 5 TABLET ORAL at 03:03

## 2023-01-01 RX ADMIN — DOCUSATE SODIUM 50 MG AND SENNOSIDES 8.6 MG 2 TABLET: 8.6; 5 TABLET, FILM COATED ORAL at 05:04

## 2023-01-01 RX ADMIN — SENNOSIDES AND DOCUSATE SODIUM 2 TABLET: 50; 8.6 TABLET ORAL at 08:39

## 2023-01-01 RX ADMIN — MICONAZOLE NITRATE: 20 CREAM TOPICAL at 05:27

## 2023-01-01 RX ADMIN — SODIUM CHLORIDE, POTASSIUM CHLORIDE, SODIUM LACTATE AND CALCIUM CHLORIDE 2259 ML: 600; 310; 30; 20 INJECTION, SOLUTION INTRAVENOUS at 21:00

## 2023-01-01 RX ADMIN — MORPHINE SULFATE 15 MG: 15 TABLET, FILM COATED, EXTENDED RELEASE ORAL at 20:19

## 2023-01-01 RX ADMIN — MORPHINE SULFATE 30 MG: 15 TABLET ORAL at 03:42

## 2023-01-01 RX ADMIN — LACTULOSE 30 ML: 20 SOLUTION ORAL at 11:12

## 2023-01-01 RX ADMIN — MORPHINE SULFATE 15 MG: 15 TABLET, FILM COATED, EXTENDED RELEASE ORAL at 05:32

## 2023-01-01 RX ADMIN — OXYCODONE HYDROCHLORIDE 15 MG: 15 TABLET ORAL at 11:07

## 2023-01-01 RX ADMIN — CEFEPIME 2 G: 2 INJECTION, POWDER, FOR SOLUTION INTRAVENOUS at 05:14

## 2023-01-01 RX ADMIN — POTASSIUM CHLORIDE 10 MEQ: 1500 TABLET, EXTENDED RELEASE ORAL at 08:05

## 2023-01-01 RX ADMIN — KETOCONAZOLE CREAM, 2%: 20 CREAM TOPICAL at 18:11

## 2023-01-01 RX ADMIN — MORPHINE SULFATE 30 MG: 15 TABLET ORAL at 14:39

## 2023-01-01 RX ADMIN — POTASSIUM PHOSPHATE, MONOBASIC AND POTASSIUM PHOSPHATE, DIBASIC 15 MMOL: 224; 236 INJECTION, SOLUTION, CONCENTRATE INTRAVENOUS at 14:33

## 2023-01-01 RX ADMIN — DIBASIC SODIUM PHOSPHATE, MONOBASIC POTASSIUM PHOSPHATE AND MONOBASIC SODIUM PHOSPHATE 500 MG: 852; 155; 130 TABLET ORAL at 17:11

## 2023-01-01 RX ADMIN — PREGABALIN 150 MG: 150 CAPSULE ORAL at 20:04

## 2023-01-01 RX ADMIN — PREGABALIN 150 MG: 150 CAPSULE ORAL at 14:35

## 2023-01-01 RX ADMIN — HYDROMORPHONE HYDROCHLORIDE 1 MG: 1 INJECTION, SOLUTION INTRAMUSCULAR; INTRAVENOUS; SUBCUTANEOUS at 05:45

## 2023-01-01 RX ADMIN — FUROSEMIDE 40 MG: 10 INJECTION, SOLUTION INTRAVENOUS at 17:27

## 2023-01-01 RX ADMIN — HYDROMORPHONE HYDROCHLORIDE 1 MG: 1 INJECTION, SOLUTION INTRAMUSCULAR; INTRAVENOUS; SUBCUTANEOUS at 21:10

## 2023-01-01 RX ADMIN — LINEZOLID 600 MG: 600 TABLET, FILM COATED ORAL at 20:44

## 2023-01-01 RX ADMIN — MORPHINE SULFATE 30 MG: 15 TABLET ORAL at 16:30

## 2023-01-01 RX ADMIN — DOCUSATE SODIUM 10 MG: 50 LIQUID ORAL at 09:04

## 2023-01-01 RX ADMIN — SODIUM CHLORIDE 500 ML: 9 INJECTION, SOLUTION INTRAVENOUS at 01:15

## 2023-01-01 RX ADMIN — SODIUM CHLORIDE: 9 INJECTION, SOLUTION INTRAVENOUS at 23:29

## 2023-01-01 RX ADMIN — OXYCODONE HYDROCHLORIDE 10 MG: 5 TABLET ORAL at 20:38

## 2023-01-01 RX ADMIN — POTASSIUM CHLORIDE 40 MEQ: 1500 TABLET, EXTENDED RELEASE ORAL at 05:18

## 2023-01-01 RX ADMIN — KETOCONAZOLE CREAM, 2%: 20 CREAM TOPICAL at 18:00

## 2023-01-01 RX ADMIN — MORPHINE SULFATE 30 MG: 15 TABLET ORAL at 05:17

## 2023-01-01 RX ADMIN — NICOTINE 7 MG: 7 PATCH, EXTENDED RELEASE TRANSDERMAL at 05:39

## 2023-01-01 RX ADMIN — OXYCODONE HYDROCHLORIDE 15 MG: 15 TABLET ORAL at 08:01

## 2023-01-01 RX ADMIN — ACETAMINOPHEN 650 MG: 325 TABLET, FILM COATED ORAL at 11:26

## 2023-01-01 RX ADMIN — SODIUM CHLORIDE, SODIUM LACTATE, POTASSIUM CHLORIDE, CALCIUM CHLORIDE AND DEXTROSE MONOHYDRATE: 5; 600; 310; 30; 20 INJECTION, SOLUTION INTRAVENOUS at 16:31

## 2023-01-01 RX ADMIN — DOCUSATE SODIUM 50 MG AND SENNOSIDES 8.6 MG 2 TABLET: 8.6; 5 TABLET, FILM COATED ORAL at 17:15

## 2023-01-01 RX ADMIN — MORPHINE SULFATE 30 MG: 15 TABLET ORAL at 00:28

## 2023-01-01 RX ADMIN — MORPHINE SULFATE 15 MG: 15 TABLET, FILM COATED, EXTENDED RELEASE ORAL at 20:47

## 2023-01-01 RX ADMIN — FERROUS SULFATE TAB 325 MG (65 MG ELEMENTAL FE) 325 MG: 325 (65 FE) TAB at 08:06

## 2023-01-01 RX ADMIN — MORPHINE SULFATE 30 MG: 30 TABLET, FILM COATED, EXTENDED RELEASE ORAL at 08:08

## 2023-01-01 RX ADMIN — HYDROMORPHONE HYDROCHLORIDE 1 MG: 1 INJECTION, SOLUTION INTRAMUSCULAR; INTRAVENOUS; SUBCUTANEOUS at 21:07

## 2023-01-01 RX ADMIN — CEFAZOLIN 2 G: 2 INJECTION, POWDER, FOR SOLUTION INTRAMUSCULAR; INTRAVENOUS at 11:37

## 2023-01-01 RX ADMIN — METHOCARBAMOL TABLETS 750 MG: 750 TABLET, COATED ORAL at 23:43

## 2023-01-01 RX ADMIN — MORPHINE SULFATE 15 MG: 15 TABLET, FILM COATED, EXTENDED RELEASE ORAL at 04:45

## 2023-01-01 RX ADMIN — GABAPENTIN 300 MG: 300 CAPSULE ORAL at 08:37

## 2023-01-01 RX ADMIN — HEPARIN SODIUM 5000 UNITS: 5000 INJECTION, SOLUTION INTRAVENOUS; SUBCUTANEOUS at 21:53

## 2023-01-01 RX ADMIN — HEPARIN SODIUM 5000 UNITS: 5000 INJECTION, SOLUTION INTRAVENOUS; SUBCUTANEOUS at 21:40

## 2023-01-01 RX ADMIN — ACETAMINOPHEN 650 MG: 325 TABLET ORAL at 18:00

## 2023-01-01 RX ADMIN — SODIUM HYPOCHLORITE 473 ML: 1.25 SOLUTION TOPICAL at 05:10

## 2023-01-01 RX ADMIN — DULOXETINE HYDROCHLORIDE 60 MG: 30 CAPSULE, DELAYED RELEASE ORAL at 08:15

## 2023-01-01 RX ADMIN — CEFAZOLIN 2 G: 2 INJECTION, POWDER, FOR SOLUTION INTRAMUSCULAR; INTRAVENOUS at 05:31

## 2023-01-01 RX ADMIN — HYDROCORTISONE: 1 CREAM TOPICAL at 20:44

## 2023-01-01 RX ADMIN — METHOCARBAMOL TABLETS 750 MG: 750 TABLET, COATED ORAL at 11:31

## 2023-01-01 RX ADMIN — HYDROMORPHONE HYDROCHLORIDE 4 MG: 2 INJECTION, SOLUTION INTRAMUSCULAR; INTRAVENOUS; SUBCUTANEOUS at 20:29

## 2023-01-01 RX ADMIN — OMEPRAZOLE 20 MG: 20 CAPSULE, DELAYED RELEASE ORAL at 04:55

## 2023-01-01 RX ADMIN — ACETAMINOPHEN 650 MG: 325 TABLET, FILM COATED ORAL at 20:31

## 2023-01-01 RX ADMIN — OXYCODONE HYDROCHLORIDE 10 MG: 10 TABLET ORAL at 01:49

## 2023-01-01 RX ADMIN — THIAMINE HCL TAB 100 MG 100 MG: 100 TAB at 06:44

## 2023-01-01 RX ADMIN — HYDROCORTISONE SODIUM SUCCINATE 50 MG: 100 INJECTION, POWDER, FOR SOLUTION INTRAMUSCULAR; INTRAVENOUS at 01:08

## 2023-01-01 RX ADMIN — MAGNESIUM HYDROXIDE 30 ML: 400 SUSPENSION ORAL at 17:37

## 2023-01-01 RX ADMIN — HYDROMORPHONE HYDROCHLORIDE 1 MG: 1 INJECTION, SOLUTION INTRAMUSCULAR; INTRAVENOUS; SUBCUTANEOUS at 14:21

## 2023-01-01 RX ADMIN — DULOXETINE HYDROCHLORIDE 60 MG: 30 CAPSULE, DELAYED RELEASE ORAL at 08:11

## 2023-01-01 RX ADMIN — FUROSEMIDE 40 MG: 10 INJECTION, SOLUTION INTRAVENOUS at 17:10

## 2023-01-01 RX ADMIN — MORPHINE SULFATE 30 MG: 15 TABLET ORAL at 00:16

## 2023-01-01 RX ADMIN — MICONAZOLE NITRATE: 20 CREAM TOPICAL at 21:20

## 2023-01-01 RX ADMIN — SIMETHICONE 125 MG: 125 TABLET, CHEWABLE ORAL at 16:15

## 2023-01-01 RX ADMIN — DULOXETINE HYDROCHLORIDE 60 MG: 60 CAPSULE, DELAYED RELEASE ORAL at 05:32

## 2023-01-01 RX ADMIN — METHOCARBAMOL TABLETS 750 MG: 750 TABLET, COATED ORAL at 14:45

## 2023-01-01 RX ADMIN — MORPHINE SULFATE 30 MG: 30 TABLET, FILM COATED, EXTENDED RELEASE ORAL at 08:27

## 2023-01-01 RX ADMIN — FUROSEMIDE 40 MG: 10 INJECTION, SOLUTION INTRAMUSCULAR; INTRAVENOUS at 05:26

## 2023-01-01 RX ADMIN — HYDROMORPHONE HYDROCHLORIDE 1 MG: 1 INJECTION, SOLUTION INTRAMUSCULAR; INTRAVENOUS; SUBCUTANEOUS at 12:29

## 2023-01-01 RX ADMIN — MORPHINE SULFATE 30 MG: 15 TABLET ORAL at 08:09

## 2023-01-01 RX ADMIN — SENNOSIDES AND DOCUSATE SODIUM 2 TABLET: 50; 8.6 TABLET ORAL at 20:15

## 2023-01-01 RX ADMIN — HYDROMORPHONE HYDROCHLORIDE 0.5 MG: 1 INJECTION, SOLUTION INTRAMUSCULAR; INTRAVENOUS; SUBCUTANEOUS at 08:15

## 2023-01-01 RX ADMIN — AMIODARONE HYDROCHLORIDE 400 MG: 200 TABLET ORAL at 17:05

## 2023-01-01 RX ADMIN — HYDROCORTISONE SODIUM SUCCINATE 50 MG: 100 INJECTION, POWDER, FOR SOLUTION INTRAMUSCULAR; INTRAVENOUS at 05:22

## 2023-01-01 RX ADMIN — LIDOCAINE HYDROCHLORIDE 4 ML: 40 SOLUTION TOPICAL at 14:36

## 2023-01-01 RX ADMIN — CEFAZOLIN 2 G: 2 INJECTION, POWDER, FOR SOLUTION INTRAMUSCULAR; INTRAVENOUS at 13:16

## 2023-01-01 RX ADMIN — MORPHINE SULFATE 30 MG: 15 TABLET ORAL at 12:12

## 2023-01-01 RX ADMIN — HYDROMORPHONE HYDROCHLORIDE 1 MG: 1 INJECTION, SOLUTION INTRAMUSCULAR; INTRAVENOUS; SUBCUTANEOUS at 05:47

## 2023-01-01 RX ADMIN — SIMETHICONE 125 MG: 125 TABLET, CHEWABLE ORAL at 21:22

## 2023-01-01 RX ADMIN — MICONAZOLE NITRATE: 20 CREAM TOPICAL at 22:33

## 2023-01-01 RX ADMIN — DOCUSATE SODIUM 50 MG AND SENNOSIDES 8.6 MG 2 TABLET: 8.6; 5 TABLET, FILM COATED ORAL at 05:42

## 2023-01-01 RX ADMIN — CEFAZOLIN 2 G: 2 INJECTION, POWDER, FOR SOLUTION INTRAMUSCULAR; INTRAVENOUS at 05:07

## 2023-01-01 RX ADMIN — DOXYCYCLINE 100 MG: 100 INJECTION, POWDER, LYOPHILIZED, FOR SOLUTION INTRAVENOUS at 06:28

## 2023-01-01 RX ADMIN — ACETAMINOPHEN 1000 MG: 500 TABLET ORAL at 14:51

## 2023-01-01 RX ADMIN — KETOCONAZOLE CREAM, 2%: 20 CREAM TOPICAL at 06:16

## 2023-01-01 RX ADMIN — ACETAMINOPHEN 650 MG: 325 TABLET ORAL at 17:41

## 2023-01-01 RX ADMIN — METHOCARBAMOL 750 MG: 750 TABLET ORAL at 09:27

## 2023-01-01 RX ADMIN — MAGNESIUM SULFATE HEPTAHYDRATE 4 G: 4 INJECTION, SOLUTION INTRAVENOUS at 03:33

## 2023-01-01 RX ADMIN — HYDROMORPHONE HYDROCHLORIDE 1 MG: 1 INJECTION, SOLUTION INTRAMUSCULAR; INTRAVENOUS; SUBCUTANEOUS at 10:29

## 2023-01-01 RX ADMIN — THIAMINE HCL TAB 100 MG 100 MG: 100 TAB at 05:14

## 2023-01-01 RX ADMIN — ACETAMINOPHEN 1000 MG: 500 TABLET ORAL at 08:43

## 2023-01-01 RX ADMIN — FUROSEMIDE 80 MG: 10 INJECTION, SOLUTION INTRAMUSCULAR; INTRAVENOUS at 05:45

## 2023-01-01 RX ADMIN — OMEPRAZOLE 20 MG: 20 CAPSULE, DELAYED RELEASE ORAL at 05:37

## 2023-01-01 RX ADMIN — DOCUSATE SODIUM 10 MG: 50 LIQUID ORAL at 15:07

## 2023-01-01 RX ADMIN — Medication 3 MG: at 21:50

## 2023-01-01 RX ADMIN — FUROSEMIDE 20 MG: 20 TABLET ORAL at 05:16

## 2023-01-01 RX ADMIN — SODIUM CHLORIDE, PRESERVATIVE FREE 20 ML: 5 INJECTION INTRAVENOUS at 21:58

## 2023-01-01 RX ADMIN — ACETAMINOPHEN 1000 MG: 500 TABLET ORAL at 22:06

## 2023-01-01 RX ADMIN — MORPHINE SULFATE 15 MG: 15 TABLET, FILM COATED, EXTENDED RELEASE ORAL at 12:12

## 2023-01-01 RX ADMIN — SENNOSIDES AND DOCUSATE SODIUM 2 TABLET: 50; 8.6 TABLET ORAL at 21:30

## 2023-01-01 RX ADMIN — MORPHINE SULFATE 30 MG: 15 TABLET ORAL at 13:08

## 2023-01-01 RX ADMIN — MORPHINE SULFATE 30 MG: 30 TABLET, FILM COATED, EXTENDED RELEASE ORAL at 06:44

## 2023-01-01 RX ADMIN — HYDROCORTISONE SODIUM SUCCINATE 50 MG: 100 INJECTION, POWDER, FOR SOLUTION INTRAMUSCULAR; INTRAVENOUS at 18:18

## 2023-01-01 RX ADMIN — NICOTINE 7 MG: 7 PATCH, EXTENDED RELEASE TRANSDERMAL at 05:15

## 2023-01-01 RX ADMIN — CEFAZOLIN 2 G: 2 INJECTION, POWDER, FOR SOLUTION INTRAMUSCULAR; INTRAVENOUS at 14:47

## 2023-01-01 RX ADMIN — ACETAMINOPHEN 1000 MG: 500 TABLET ORAL at 21:41

## 2023-01-01 RX ADMIN — NOREPINEPHRINE BITARTRATE 0.04 MCG/KG/MIN: 1 INJECTION INTRAVENOUS at 07:03

## 2023-01-01 RX ADMIN — ZOLPIDEM TARTRATE 5 MG: 5 TABLET ORAL at 21:47

## 2023-01-01 RX ADMIN — Medication 950 MG: at 08:28

## 2023-01-01 RX ADMIN — MORPHINE SULFATE 15 MG: 15 TABLET, FILM COATED, EXTENDED RELEASE ORAL at 08:13

## 2023-01-01 RX ADMIN — CEFAZOLIN 2 G: 2 INJECTION, POWDER, FOR SOLUTION INTRAMUSCULAR; INTRAVENOUS at 05:22

## 2023-01-01 RX ADMIN — ACETAMINOPHEN 1000 MG: 500 TABLET ORAL at 15:05

## 2023-01-01 RX ADMIN — OXYCODONE HYDROCHLORIDE 10 MG: 10 TABLET ORAL at 17:00

## 2023-01-01 RX ADMIN — ACETAMINOPHEN 650 MG: 325 TABLET, FILM COATED ORAL at 11:38

## 2023-01-01 RX ADMIN — LINEZOLID 600 MG: 600 TABLET, FILM COATED ORAL at 17:08

## 2023-01-01 RX ADMIN — SODIUM CHLORIDE, POTASSIUM CHLORIDE, SODIUM LACTATE AND CALCIUM CHLORIDE 1000 ML: 600; 310; 30; 20 INJECTION, SOLUTION INTRAVENOUS at 11:40

## 2023-01-01 RX ADMIN — MORPHINE SULFATE 15 MG: 15 TABLET, FILM COATED, EXTENDED RELEASE ORAL at 18:12

## 2023-01-01 RX ADMIN — MORPHINE SULFATE 30 MG: 15 TABLET ORAL at 09:27

## 2023-01-01 RX ADMIN — HEPARIN SODIUM 18 UNITS/KG/HR: 5000 INJECTION, SOLUTION INTRAVENOUS at 14:17

## 2023-01-01 RX ADMIN — ENOXAPARIN SODIUM 40 MG: 100 INJECTION SUBCUTANEOUS at 17:52

## 2023-01-01 RX ADMIN — THIAMINE HCL TAB 100 MG 100 MG: 100 TAB at 05:08

## 2023-01-01 RX ADMIN — ALBUTEROL SULFATE 2.5 MG: 2.5 SOLUTION RESPIRATORY (INHALATION) at 10:06

## 2023-01-01 RX ADMIN — ERGOCALCIFEROL 50000 UNITS: 1.25 CAPSULE ORAL at 08:12

## 2023-01-01 RX ADMIN — OXYCODONE HYDROCHLORIDE 15 MG: 15 TABLET ORAL at 09:07

## 2023-01-01 RX ADMIN — DOCUSATE SODIUM 50 MG AND SENNOSIDES 8.6 MG 2 TABLET: 8.6; 5 TABLET, FILM COATED ORAL at 18:23

## 2023-01-01 RX ADMIN — Medication 950 MG: at 08:54

## 2023-01-01 RX ADMIN — FUROSEMIDE 40 MG: 10 INJECTION, SOLUTION INTRAMUSCULAR; INTRAVENOUS at 05:35

## 2023-01-01 RX ADMIN — MORPHINE SULFATE 30 MG: 15 TABLET ORAL at 20:11

## 2023-01-01 RX ADMIN — MORPHINE SULFATE 30 MG: 30 TABLET, FILM COATED, EXTENDED RELEASE ORAL at 04:54

## 2023-01-01 RX ADMIN — HYDROMORPHONE HYDROCHLORIDE 1 MG: 1 INJECTION, SOLUTION INTRAMUSCULAR; INTRAVENOUS; SUBCUTANEOUS at 08:22

## 2023-01-01 RX ADMIN — CEFDINIR 300 MG: 300 CAPSULE ORAL at 20:40

## 2023-01-01 RX ADMIN — SODIUM BICARBONATE 50 MEQ: 84 INJECTION, SOLUTION INTRAVENOUS at 14:26

## 2023-01-01 RX ADMIN — CEFAZOLIN 2 G: 2 INJECTION, POWDER, FOR SOLUTION INTRAMUSCULAR; INTRAVENOUS at 22:36

## 2023-01-01 RX ADMIN — OXYCODONE HYDROCHLORIDE 15 MG: 15 TABLET ORAL at 05:20

## 2023-01-01 RX ADMIN — PREGABALIN 150 MG: 150 CAPSULE ORAL at 14:27

## 2023-01-01 RX ADMIN — THERA TABS 1 TABLET: TAB at 09:15

## 2023-01-01 RX ADMIN — THIAMINE HCL TAB 100 MG 100 MG: 100 TAB at 08:55

## 2023-01-01 RX ADMIN — METOPROLOL TARTRATE 2.5 MG: 5 INJECTION INTRAVENOUS at 15:50

## 2023-01-01 RX ADMIN — OXYCODONE HYDROCHLORIDE 10 MG: 10 TABLET ORAL at 14:02

## 2023-01-01 RX ADMIN — THIAMINE HCL TAB 100 MG 100 MG: 100 TAB at 05:16

## 2023-01-01 RX ADMIN — FERROUS SULFATE TAB 325 MG (65 MG ELEMENTAL FE) 325 MG: 325 (65 FE) TAB at 08:50

## 2023-01-01 RX ADMIN — CEFAZOLIN 2 G: 2 INJECTION, POWDER, FOR SOLUTION INTRAMUSCULAR; INTRAVENOUS at 15:11

## 2023-01-01 RX ADMIN — OMEPRAZOLE 40 MG: 20 CAPSULE, DELAYED RELEASE ORAL at 04:54

## 2023-01-01 RX ADMIN — POTASSIUM CHLORIDE 10 MEQ: 1500 TABLET, EXTENDED RELEASE ORAL at 15:21

## 2023-01-01 RX ADMIN — CEFAZOLIN 2 G: 2 INJECTION, POWDER, FOR SOLUTION INTRAMUSCULAR; INTRAVENOUS at 05:18

## 2023-01-01 RX ADMIN — DULOXETINE HYDROCHLORIDE 60 MG: 30 CAPSULE, DELAYED RELEASE ORAL at 08:21

## 2023-01-01 RX ADMIN — MORPHINE SULFATE 30 MG: 15 TABLET ORAL at 11:25

## 2023-01-01 RX ADMIN — HEPARIN SODIUM 5000 UNITS: 5000 INJECTION, SOLUTION INTRAVENOUS; SUBCUTANEOUS at 14:15

## 2023-01-01 RX ADMIN — OXYCODONE HYDROCHLORIDE 15 MG: 15 TABLET ORAL at 05:46

## 2023-01-01 RX ADMIN — BUPIVACAINE HYDROCHLORIDE 30 ML: 5 INJECTION, SOLUTION EPIDURAL; INTRACAUDAL at 17:12

## 2023-01-01 RX ADMIN — THERA TABS 1 TABLET: TAB at 08:10

## 2023-01-01 RX ADMIN — DULOXETINE HYDROCHLORIDE 60 MG: 30 CAPSULE, DELAYED RELEASE ORAL at 08:20

## 2023-01-01 RX ADMIN — METHYLPREDNISOLONE 4 MG: 4 TABLET ORAL at 08:54

## 2023-01-01 RX ADMIN — AMIODARONE HYDROCHLORIDE 400 MG: 200 TABLET ORAL at 09:44

## 2023-01-01 RX ADMIN — HYDROMORPHONE HYDROCHLORIDE 0.5 MG: 1 INJECTION, SOLUTION INTRAMUSCULAR; INTRAVENOUS; SUBCUTANEOUS at 23:36

## 2023-01-01 RX ADMIN — MORPHINE SULFATE 15 MG: 15 TABLET, FILM COATED, EXTENDED RELEASE ORAL at 05:19

## 2023-01-01 RX ADMIN — MORPHINE SULFATE 30 MG: 15 TABLET ORAL at 13:13

## 2023-01-01 RX ADMIN — AMIODARONE HYDROCHLORIDE 400 MG: 200 TABLET ORAL at 17:56

## 2023-01-01 RX ADMIN — MORPHINE SULFATE 30 MG: 15 TABLET ORAL at 08:14

## 2023-01-01 RX ADMIN — PIPERACILLIN AND TAZOBACTAM 3.38 G: 3; .375 INJECTION, POWDER, FOR SOLUTION INTRAVENOUS at 11:59

## 2023-01-01 RX ADMIN — ACETAMINOPHEN 650 MG: 325 TABLET ORAL at 05:36

## 2023-01-01 RX ADMIN — HEPARIN SODIUM 2000 UNITS: 200 INJECTION, SOLUTION INTRAVENOUS at 16:48

## 2023-01-01 RX ADMIN — ACETAMINOPHEN 1000 MG: 500 TABLET ORAL at 15:18

## 2023-01-01 RX ADMIN — PREGABALIN 100 MG: 100 CAPSULE ORAL at 15:07

## 2023-01-01 RX ADMIN — METHOCARBAMOL TABLETS 750 MG: 750 TABLET, COATED ORAL at 16:53

## 2023-01-01 RX ADMIN — FUROSEMIDE 40 MG: 10 INJECTION, SOLUTION INTRAVENOUS at 16:51

## 2023-01-01 RX ADMIN — MICONAZOLE NITRATE: 20 CREAM TOPICAL at 23:57

## 2023-01-01 RX ADMIN — MORPHINE SULFATE 30 MG: 30 TABLET, FILM COATED, EXTENDED RELEASE ORAL at 21:10

## 2023-01-01 RX ADMIN — PREGABALIN 150 MG: 150 CAPSULE ORAL at 17:16

## 2023-01-01 RX ADMIN — DOCUSATE SODIUM 50 MG AND SENNOSIDES 8.6 MG 2 TABLET: 8.6; 5 TABLET, FILM COATED ORAL at 17:38

## 2023-01-01 RX ADMIN — DULOXETINE HYDROCHLORIDE 60 MG: 60 CAPSULE, DELAYED RELEASE ORAL at 06:22

## 2023-01-01 RX ADMIN — MIDODRINE HYDROCHLORIDE 5 MG: 5 TABLET ORAL at 08:33

## 2023-01-01 RX ADMIN — CEFEPIME 2 G: 2 INJECTION, POWDER, FOR SOLUTION INTRAVENOUS at 14:13

## 2023-01-01 RX ADMIN — ZOLPIDEM TARTRATE 5 MG: 5 TABLET ORAL at 22:29

## 2023-01-01 RX ADMIN — HYDROMORPHONE HYDROCHLORIDE 0.5 MG: 1 INJECTION, SOLUTION INTRAMUSCULAR; INTRAVENOUS; SUBCUTANEOUS at 23:50

## 2023-01-01 RX ADMIN — OXYCODONE HYDROCHLORIDE 10 MG: 10 TABLET ORAL at 14:27

## 2023-01-01 RX ADMIN — OXYCODONE HYDROCHLORIDE 10 MG: 10 TABLET ORAL at 21:42

## 2023-01-01 RX ADMIN — HYDROMORPHONE HYDROCHLORIDE 0.5 MG: 1 INJECTION, SOLUTION INTRAMUSCULAR; INTRAVENOUS; SUBCUTANEOUS at 09:51

## 2023-01-01 RX ADMIN — SENNOSIDES AND DOCUSATE SODIUM 2 TABLET: 8.6; 5 TABLET ORAL at 20:45

## 2023-01-01 RX ADMIN — OMEPRAZOLE 20 MG: 20 CAPSULE, DELAYED RELEASE ORAL at 17:40

## 2023-01-01 RX ADMIN — SODIUM CHLORIDE 1 G: 1 TABLET ORAL at 11:39

## 2023-01-01 RX ADMIN — SENNOSIDES AND DOCUSATE SODIUM 2 TABLET: 50; 8.6 TABLET ORAL at 21:51

## 2023-01-01 RX ADMIN — Medication 1000 UNITS: at 04:45

## 2023-01-01 RX ADMIN — MORPHINE SULFATE 15 MG: 15 TABLET, FILM COATED, EXTENDED RELEASE ORAL at 21:15

## 2023-01-01 RX ADMIN — ACETAMINOPHEN 650 MG: 325 TABLET ORAL at 23:46

## 2023-01-01 RX ADMIN — ENOXAPARIN SODIUM 40 MG: 100 INJECTION SUBCUTANEOUS at 18:02

## 2023-01-01 RX ADMIN — OXYCODONE HYDROCHLORIDE 15 MG: 15 TABLET ORAL at 08:41

## 2023-01-01 RX ADMIN — MORPHINE SULFATE 30 MG: 15 TABLET ORAL at 06:23

## 2023-01-01 RX ADMIN — MORPHINE SULFATE 30 MG: 15 TABLET ORAL at 11:48

## 2023-01-01 RX ADMIN — FUROSEMIDE 60 MG: 10 INJECTION, SOLUTION INTRAMUSCULAR; INTRAVENOUS at 05:36

## 2023-01-01 RX ADMIN — SENNOSIDES AND DOCUSATE SODIUM 2 TABLET: 50; 8.6 TABLET ORAL at 21:31

## 2023-01-01 RX ADMIN — FLUCONAZOLE 200 MG: 100 TABLET ORAL at 15:23

## 2023-01-01 RX ADMIN — DULOXETINE HYDROCHLORIDE 60 MG: 60 CAPSULE, DELAYED RELEASE ORAL at 05:33

## 2023-01-01 RX ADMIN — CEFAZOLIN 2 G: 2 INJECTION, POWDER, FOR SOLUTION INTRAMUSCULAR; INTRAVENOUS at 05:21

## 2023-01-01 RX ADMIN — SODIUM HYPOCHLORITE 10 ML: 1.25 SOLUTION TOPICAL at 17:20

## 2023-01-01 RX ADMIN — MICONAZOLE NITRATE: 20 CREAM TOPICAL at 11:34

## 2023-01-01 RX ADMIN — METHOCARBAMOL TABLETS 750 MG: 750 TABLET, COATED ORAL at 18:31

## 2023-01-01 RX ADMIN — OXYCODONE HYDROCHLORIDE 10 MG: 10 TABLET ORAL at 04:21

## 2023-01-01 RX ADMIN — KETOCONAZOLE CREAM, 2%: 20 CREAM TOPICAL at 23:56

## 2023-01-01 RX ADMIN — OMEPRAZOLE 20 MG: 20 CAPSULE, DELAYED RELEASE ORAL at 05:28

## 2023-01-01 RX ADMIN — MORPHINE SULFATE 15 MG: 15 TABLET, FILM COATED, EXTENDED RELEASE ORAL at 17:46

## 2023-01-01 RX ADMIN — CEFEPIME 2 G: 2 INJECTION, POWDER, FOR SOLUTION INTRAVENOUS at 14:43

## 2023-01-01 RX ADMIN — HYDROMORPHONE HYDROCHLORIDE 0.5 MG: 1 INJECTION, SOLUTION INTRAMUSCULAR; INTRAVENOUS; SUBCUTANEOUS at 15:56

## 2023-01-01 RX ADMIN — THIAMINE HCL TAB 100 MG 100 MG: 100 TAB at 08:38

## 2023-01-01 RX ADMIN — MIDAZOLAM HYDROCHLORIDE 1 MG: 1 INJECTION, SOLUTION INTRAMUSCULAR; INTRAVENOUS at 17:23

## 2023-01-01 RX ADMIN — DOCUSATE SODIUM 50 MG AND SENNOSIDES 8.6 MG 2 TABLET: 8.6; 5 TABLET, FILM COATED ORAL at 17:26

## 2023-01-01 RX ADMIN — MORPHINE SULFATE 30 MG: 30 TABLET, FILM COATED, EXTENDED RELEASE ORAL at 04:34

## 2023-01-01 RX ADMIN — METHYLPREDNISOLONE 4 MG: 4 TABLET ORAL at 11:28

## 2023-01-01 RX ADMIN — MORPHINE SULFATE 30 MG: 30 TABLET, FILM COATED, EXTENDED RELEASE ORAL at 05:22

## 2023-01-01 RX ADMIN — MAGNESIUM HYDROXIDE 30 ML: 400 SUSPENSION ORAL at 17:47

## 2023-01-01 RX ADMIN — MORPHINE SULFATE 30 MG: 15 TABLET ORAL at 11:51

## 2023-01-01 RX ADMIN — FERROUS SULFATE TAB 325 MG (65 MG ELEMENTAL FE) 325 MG: 325 (65 FE) TAB at 08:24

## 2023-01-01 RX ADMIN — HYDROCORTISONE SODIUM SUCCINATE 50 MG: 100 INJECTION, POWDER, FOR SOLUTION INTRAMUSCULAR; INTRAVENOUS at 11:23

## 2023-01-01 RX ADMIN — HYDROMORPHONE HYDROCHLORIDE 0.5 MG: 1 INJECTION, SOLUTION INTRAMUSCULAR; INTRAVENOUS; SUBCUTANEOUS at 18:58

## 2023-01-01 RX ADMIN — MORPHINE SULFATE 30 MG: 15 TABLET ORAL at 17:48

## 2023-01-01 RX ADMIN — MORPHINE SULFATE 30 MG: 30 TABLET, FILM COATED, EXTENDED RELEASE ORAL at 08:57

## 2023-01-01 RX ADMIN — PREGABALIN 200 MG: 100 CAPSULE ORAL at 08:56

## 2023-01-01 RX ADMIN — Medication 950 MG: at 05:12

## 2023-01-01 RX ADMIN — DAPAGLIFLOZIN 10 MG: 10 TABLET, FILM COATED ORAL at 05:17

## 2023-01-01 RX ADMIN — OXYCODONE HYDROCHLORIDE 15 MG: 15 TABLET ORAL at 22:17

## 2023-01-01 RX ADMIN — ACETAMINOPHEN 1000 MG: 500 TABLET ORAL at 21:22

## 2023-01-01 RX ADMIN — PREGABALIN 200 MG: 100 CAPSULE ORAL at 08:32

## 2023-01-01 RX ADMIN — POTASSIUM CHLORIDE 40 MEQ: 1500 TABLET, EXTENDED RELEASE ORAL at 06:00

## 2023-01-01 RX ADMIN — DEXTROSE AND SODIUM CHLORIDE: 10; .45 INJECTION, SOLUTION INTRAVENOUS at 14:15

## 2023-01-01 RX ADMIN — MORPHINE SULFATE 30 MG: 15 TABLET ORAL at 15:28

## 2023-01-01 RX ADMIN — Medication 950 MG: at 04:34

## 2023-01-01 RX ADMIN — OXYCODONE HYDROCHLORIDE 15 MG: 15 TABLET ORAL at 12:34

## 2023-01-01 RX ADMIN — OXYCODONE HYDROCHLORIDE 10 MG: 10 TABLET ORAL at 06:07

## 2023-01-01 RX ADMIN — PREGABALIN 150 MG: 150 CAPSULE ORAL at 20:56

## 2023-01-01 RX ADMIN — DULOXETINE HYDROCHLORIDE 60 MG: 60 CAPSULE, DELAYED RELEASE ORAL at 05:02

## 2023-01-01 RX ADMIN — PREGABALIN 150 MG: 150 CAPSULE ORAL at 08:29

## 2023-01-01 RX ADMIN — ACETAMINOPHEN 650 MG: 325 TABLET, FILM COATED ORAL at 02:31

## 2023-01-01 RX ADMIN — MORPHINE SULFATE 30 MG: 15 TABLET ORAL at 12:54

## 2023-01-01 RX ADMIN — DULOXETINE HYDROCHLORIDE 60 MG: 30 CAPSULE, DELAYED RELEASE ORAL at 06:00

## 2023-01-01 RX ADMIN — OXYCODONE HYDROCHLORIDE 10 MG: 5 SOLUTION ORAL at 17:16

## 2023-01-01 RX ADMIN — MORPHINE SULFATE 30 MG: 15 TABLET ORAL at 19:40

## 2023-01-01 RX ADMIN — CEFAZOLIN 2 G: 1 INJECTION, POWDER, FOR SOLUTION INTRAMUSCULAR; INTRAVENOUS at 09:41

## 2023-01-01 RX ADMIN — NICOTINE 7 MG: 7 PATCH TRANSDERMAL at 05:15

## 2023-01-01 RX ADMIN — MORPHINE SULFATE 30 MG: 30 TABLET, FILM COATED, EXTENDED RELEASE ORAL at 08:54

## 2023-01-01 RX ADMIN — PIPERACILLIN AND TAZOBACTAM 4.5 G: 4; .5 INJECTION, POWDER, FOR SOLUTION INTRAVENOUS at 07:26

## 2023-01-01 RX ADMIN — THERA TABS 1 TABLET: TAB at 08:55

## 2023-01-01 RX ADMIN — MORPHINE SULFATE 30 MG: 15 TABLET ORAL at 14:41

## 2023-01-01 RX ADMIN — AMIODARONE HYDROCHLORIDE 400 MG: 200 TABLET ORAL at 17:16

## 2023-01-01 RX ADMIN — DULOXETINE HYDROCHLORIDE 60 MG: 30 CAPSULE, DELAYED RELEASE ORAL at 08:37

## 2023-01-01 RX ADMIN — NOREPINEPHRINE BITARTRATE 0.05 MCG/KG/MIN: 1 INJECTION INTRAVENOUS at 02:33

## 2023-01-01 RX ADMIN — PREGABALIN 150 MG: 150 CAPSULE ORAL at 08:25

## 2023-01-01 RX ADMIN — NOREPINEPHRINE BITARTRATE 0.12 MCG/KG/MIN: 1 INJECTION, SOLUTION, CONCENTRATE INTRAVENOUS at 14:53

## 2023-01-01 RX ADMIN — OXYCODONE HYDROCHLORIDE 10 MG: 5 TABLET ORAL at 09:46

## 2023-01-01 RX ADMIN — MORPHINE SULFATE 30 MG: 30 TABLET, FILM COATED, EXTENDED RELEASE ORAL at 08:21

## 2023-01-01 RX ADMIN — CEFAZOLIN 2 G: 2 INJECTION, POWDER, FOR SOLUTION INTRAMUSCULAR; INTRAVENOUS at 15:00

## 2023-01-01 RX ADMIN — HYDROMORPHONE HYDROCHLORIDE 0.5 MG: 1 INJECTION, SOLUTION INTRAMUSCULAR; INTRAVENOUS; SUBCUTANEOUS at 10:14

## 2023-01-01 RX ADMIN — MORPHINE SULFATE 30 MG: 30 TABLET, FILM COATED, EXTENDED RELEASE ORAL at 21:30

## 2023-01-01 RX ADMIN — HYDROCORTISONE SODIUM SUCCINATE 50 MG: 100 INJECTION, POWDER, FOR SOLUTION INTRAMUSCULAR; INTRAVENOUS at 17:04

## 2023-01-01 RX ADMIN — OXYCODONE HYDROCHLORIDE 10 MG: 5 TABLET ORAL at 08:48

## 2023-01-01 RX ADMIN — ACETAMINOPHEN 650 MG: 325 TABLET ORAL at 23:50

## 2023-01-01 RX ADMIN — PREGABALIN 200 MG: 100 CAPSULE ORAL at 20:35

## 2023-01-01 RX ADMIN — LIDOCAINE HYDROCHLORIDE 100 MG: 20 INJECTION, SOLUTION EPIDURAL; INFILTRATION; INTRACAUDAL at 08:44

## 2023-01-01 RX ADMIN — ALBUTEROL SULFATE 2.5 MG: 2.5 SOLUTION RESPIRATORY (INHALATION) at 15:18

## 2023-01-01 RX ADMIN — PREGABALIN 200 MG: 100 CAPSULE ORAL at 20:39

## 2023-01-01 RX ADMIN — SODIUM CHLORIDE 2 G: 1 TABLET ORAL at 08:41

## 2023-01-01 RX ADMIN — DOBUTAMINE HYDROCHLORIDE 1 MCG/KG/MIN: 100 INJECTION INTRAVENOUS at 13:48

## 2023-01-01 RX ADMIN — PREGABALIN 150 MG: 150 CAPSULE ORAL at 08:11

## 2023-01-01 RX ADMIN — OMEPRAZOLE 20 MG: 20 CAPSULE, DELAYED RELEASE ORAL at 08:50

## 2023-01-01 RX ADMIN — HEPARIN SODIUM 5000 UNITS: 5000 INJECTION, SOLUTION INTRAVENOUS; SUBCUTANEOUS at 13:33

## 2023-01-01 RX ADMIN — SODIUM CHLORIDE 500 ML: 9 INJECTION, SOLUTION INTRAVENOUS at 00:11

## 2023-01-01 RX ADMIN — ACETAMINOPHEN 650 MG: 325 TABLET, FILM COATED ORAL at 12:00

## 2023-01-01 RX ADMIN — ACETAMINOPHEN 650 MG: 325 TABLET ORAL at 11:57

## 2023-01-01 RX ADMIN — OMEPRAZOLE 20 MG: 20 CAPSULE, DELAYED RELEASE ORAL at 06:32

## 2023-01-01 RX ADMIN — METHOCARBAMOL TABLETS 750 MG: 750 TABLET, COATED ORAL at 18:07

## 2023-01-01 RX ADMIN — GABAPENTIN 300 MG: 300 CAPSULE ORAL at 14:08

## 2023-01-01 RX ADMIN — ACETAMINOPHEN 650 MG: 325 TABLET ORAL at 05:54

## 2023-01-01 RX ADMIN — PIPERACILLIN AND TAZOBACTAM 4.5 G: 4; .5 INJECTION, POWDER, LYOPHILIZED, FOR SOLUTION INTRAVENOUS; PARENTERAL at 18:18

## 2023-01-01 RX ADMIN — MORPHINE SULFATE 30 MG: 15 TABLET ORAL at 12:17

## 2023-01-01 RX ADMIN — GABAPENTIN 300 MG: 300 CAPSULE ORAL at 05:22

## 2023-01-01 RX ADMIN — SENNOSIDES AND DOCUSATE SODIUM 2 TABLET: 50; 8.6 TABLET ORAL at 21:52

## 2023-01-01 RX ADMIN — TRANEXAMIC ACID 1000 MG: 100 INJECTION, SOLUTION INTRAVENOUS at 14:40

## 2023-01-01 RX ADMIN — SODIUM CHLORIDE: 9 INJECTION, SOLUTION INTRAVENOUS at 07:59

## 2023-01-01 RX ADMIN — OXYCODONE HYDROCHLORIDE 10 MG: 10 TABLET, FILM COATED, EXTENDED RELEASE ORAL at 17:07

## 2023-01-01 RX ADMIN — DAPAGLIFLOZIN 10 MG: 10 TABLET, FILM COATED ORAL at 08:46

## 2023-01-01 RX ADMIN — OMEPRAZOLE 20 MG: 20 CAPSULE, DELAYED RELEASE ORAL at 18:36

## 2023-01-01 RX ADMIN — GABAPENTIN 300 MG: 300 CAPSULE ORAL at 08:33

## 2023-01-01 RX ADMIN — GABAPENTIN 300 MG: 300 CAPSULE ORAL at 14:28

## 2023-01-01 RX ADMIN — APIXABAN 5 MG: 5 TABLET, FILM COATED ORAL at 17:46

## 2023-01-01 RX ADMIN — PREGABALIN 200 MG: 100 CAPSULE ORAL at 08:33

## 2023-01-01 RX ADMIN — POTASSIUM CHLORIDE 40 MEQ: 1500 TABLET, EXTENDED RELEASE ORAL at 08:39

## 2023-01-01 RX ADMIN — Medication 3 MG: at 21:40

## 2023-01-01 RX ADMIN — MORPHINE SULFATE 30 MG: 15 TABLET ORAL at 16:41

## 2023-01-01 RX ADMIN — MORPHINE SULFATE 30 MG: 15 TABLET ORAL at 21:41

## 2023-01-01 RX ADMIN — KETOCONAZOLE CREAM, 2%: 20 CREAM TOPICAL at 00:00

## 2023-01-01 RX ADMIN — MORPHINE SULFATE 15 MG: 15 TABLET, FILM COATED, EXTENDED RELEASE ORAL at 09:43

## 2023-01-01 RX ADMIN — DOCUSATE SODIUM 50 MG AND SENNOSIDES 8.6 MG 2 TABLET: 8.6; 5 TABLET, FILM COATED ORAL at 05:20

## 2023-01-01 RX ADMIN — CEFAZOLIN 2 G: 2 INJECTION, POWDER, FOR SOLUTION INTRAMUSCULAR; INTRAVENOUS at 05:13

## 2023-01-01 RX ADMIN — OXYCODONE HYDROCHLORIDE 10 MG: 5 TABLET ORAL at 13:13

## 2023-01-01 RX ADMIN — LACTULOSE 30 ML: 20 SOLUTION ORAL at 05:17

## 2023-01-01 RX ADMIN — HYDROMORPHONE HYDROCHLORIDE 0.5 MG: 1 INJECTION, SOLUTION INTRAMUSCULAR; INTRAVENOUS; SUBCUTANEOUS at 21:30

## 2023-01-01 RX ADMIN — SENNOSIDES AND DOCUSATE SODIUM 2 TABLET: 8.6; 5 TABLET ORAL at 21:16

## 2023-01-01 RX ADMIN — VERAPAMIL HYDROCHLORIDE 2.5 MG: 2.5 INJECTION, SOLUTION INTRAVENOUS at 16:48

## 2023-01-01 RX ADMIN — GABAPENTIN 300 MG: 300 CAPSULE ORAL at 18:14

## 2023-01-01 RX ADMIN — CEFEPIME 2 G: 2 INJECTION, POWDER, FOR SOLUTION INTRAVENOUS at 22:18

## 2023-01-01 RX ADMIN — NICOTINE 7 MG: 7 PATCH, EXTENDED RELEASE TRANSDERMAL at 05:32

## 2023-01-01 RX ADMIN — MORPHINE SULFATE 15 MG: 15 TABLET, FILM COATED, EXTENDED RELEASE ORAL at 05:18

## 2023-01-01 RX ADMIN — PREGABALIN 200 MG: 100 CAPSULE ORAL at 21:23

## 2023-01-01 RX ADMIN — DOCUSATE SODIUM 50 MG AND SENNOSIDES 8.6 MG 2 TABLET: 8.6; 5 TABLET, FILM COATED ORAL at 17:42

## 2023-01-01 RX ADMIN — Medication 1000 UNITS: at 08:56

## 2023-01-01 RX ADMIN — MORPHINE SULFATE 30 MG: 15 TABLET ORAL at 00:05

## 2023-01-01 RX ADMIN — CEFEPIME 2 G: 2 INJECTION, POWDER, FOR SOLUTION INTRAVENOUS at 21:59

## 2023-01-01 RX ADMIN — MORPHINE SULFATE 15 MG: 15 TABLET, FILM COATED, EXTENDED RELEASE ORAL at 05:53

## 2023-01-01 RX ADMIN — CEFAZOLIN 2 G: 2 INJECTION, POWDER, FOR SOLUTION INTRAMUSCULAR; INTRAVENOUS at 06:20

## 2023-01-01 RX ADMIN — HYDROMORPHONE HYDROCHLORIDE 1 MG: 1 INJECTION, SOLUTION INTRAMUSCULAR; INTRAVENOUS; SUBCUTANEOUS at 12:34

## 2023-01-01 RX ADMIN — OMEPRAZOLE 20 MG: 20 CAPSULE, DELAYED RELEASE ORAL at 08:15

## 2023-01-01 RX ADMIN — MORPHINE SULFATE 15 MG: 15 TABLET, FILM COATED, EXTENDED RELEASE ORAL at 21:11

## 2023-01-01 RX ADMIN — HYDROMORPHONE HYDROCHLORIDE 0.5 MG: 1 INJECTION, SOLUTION INTRAMUSCULAR; INTRAVENOUS; SUBCUTANEOUS at 14:47

## 2023-01-01 RX ADMIN — MORPHINE SULFATE 30 MG: 15 TABLET ORAL at 12:26

## 2023-01-01 RX ADMIN — SODIUM CHLORIDE, PRESERVATIVE FREE 20 ML: 5 INJECTION INTRAVENOUS at 08:49

## 2023-01-01 RX ADMIN — MORPHINE SULFATE 30 MG: 15 TABLET ORAL at 15:06

## 2023-01-01 RX ADMIN — MORPHINE SULFATE 30 MG: 15 TABLET ORAL at 06:51

## 2023-01-01 RX ADMIN — OMEPRAZOLE 20 MG: 20 CAPSULE, DELAYED RELEASE ORAL at 05:08

## 2023-01-01 RX ADMIN — MORPHINE SULFATE 30 MG: 15 TABLET ORAL at 11:33

## 2023-01-01 RX ADMIN — ACETAMINOPHEN 650 MG: 325 TABLET ORAL at 00:14

## 2023-01-01 RX ADMIN — HYDROMORPHONE HYDROCHLORIDE 0.5 MG: 1 INJECTION, SOLUTION INTRAMUSCULAR; INTRAVENOUS; SUBCUTANEOUS at 17:28

## 2023-01-01 RX ADMIN — METHOCARBAMOL TABLETS 750 MG: 750 TABLET, COATED ORAL at 23:52

## 2023-01-01 RX ADMIN — IPRATROPIUM BROMIDE AND ALBUTEROL SULFATE 3 ML: .5; 2.5 SOLUTION RESPIRATORY (INHALATION) at 06:37

## 2023-01-01 RX ADMIN — PREGABALIN 75 MG: 75 CAPSULE ORAL at 17:34

## 2023-01-01 RX ADMIN — LINEZOLID 600 MG: 600 INJECTION, SOLUTION INTRAVENOUS at 05:17

## 2023-01-01 RX ADMIN — MICONAZOLE NITRATE: 20 CREAM TOPICAL at 17:36

## 2023-01-01 RX ADMIN — OXYCODONE HYDROCHLORIDE 15 MG: 15 TABLET ORAL at 17:46

## 2023-01-01 RX ADMIN — HYDROMORPHONE HYDROCHLORIDE 0.4 MG: 1 INJECTION, SOLUTION INTRAMUSCULAR; INTRAVENOUS; SUBCUTANEOUS at 17:21

## 2023-01-01 RX ADMIN — MORPHINE SULFATE 30 MG: 15 TABLET ORAL at 08:36

## 2023-01-01 RX ADMIN — OXYCODONE HYDROCHLORIDE 15 MG: 15 TABLET ORAL at 03:04

## 2023-01-01 RX ADMIN — OXYCODONE HYDROCHLORIDE 10 MG: 10 TABLET ORAL at 07:38

## 2023-01-01 RX ADMIN — MORPHINE SULFATE 30 MG: 15 TABLET ORAL at 17:27

## 2023-01-01 RX ADMIN — MORPHINE SULFATE 30 MG: 15 TABLET ORAL at 05:04

## 2023-01-01 RX ADMIN — ACETAMINOPHEN 650 MG: 325 TABLET, FILM COATED ORAL at 11:19

## 2023-01-01 RX ADMIN — OMEPRAZOLE 40 MG: 20 CAPSULE, DELAYED RELEASE ORAL at 08:10

## 2023-01-01 RX ADMIN — DULOXETINE HYDROCHLORIDE 60 MG: 30 CAPSULE, DELAYED RELEASE ORAL at 05:18

## 2023-01-01 RX ADMIN — ACETAMINOPHEN 650 MG: 325 TABLET ORAL at 17:19

## 2023-01-01 RX ADMIN — HYDROMORPHONE HYDROCHLORIDE 1 MG: 1 INJECTION, SOLUTION INTRAMUSCULAR; INTRAVENOUS; SUBCUTANEOUS at 23:13

## 2023-01-01 RX ADMIN — HEPARIN SODIUM 5000 UNITS: 5000 INJECTION, SOLUTION INTRAVENOUS; SUBCUTANEOUS at 13:18

## 2023-01-01 RX ADMIN — METHOCARBAMOL 750 MG: 750 TABLET ORAL at 11:41

## 2023-01-01 RX ADMIN — HYDROMORPHONE HYDROCHLORIDE 1 MG: 1 INJECTION, SOLUTION INTRAMUSCULAR; INTRAVENOUS; SUBCUTANEOUS at 01:08

## 2023-01-01 RX ADMIN — PREDNISONE 10 MG: 5 TABLET ORAL at 08:39

## 2023-01-01 RX ADMIN — DOCUSATE SODIUM 50 MG AND SENNOSIDES 8.6 MG 2 TABLET: 8.6; 5 TABLET, FILM COATED ORAL at 05:10

## 2023-01-01 RX ADMIN — SENNOSIDES AND DOCUSATE SODIUM 2 TABLET: 50; 8.6 TABLET ORAL at 07:49

## 2023-01-01 RX ADMIN — HEPARIN SODIUM 5000 UNITS: 5000 INJECTION, SOLUTION INTRAVENOUS; SUBCUTANEOUS at 06:00

## 2023-01-01 RX ADMIN — Medication 3 MG: at 21:36

## 2023-01-01 RX ADMIN — MORPHINE SULFATE 30 MG: 15 TABLET ORAL at 17:24

## 2023-01-01 RX ADMIN — HEPARIN SODIUM 5000 UNITS: 5000 INJECTION, SOLUTION INTRAVENOUS; SUBCUTANEOUS at 08:08

## 2023-01-01 RX ADMIN — MORPHINE SULFATE 15 MG: 15 TABLET, FILM COATED, EXTENDED RELEASE ORAL at 16:41

## 2023-01-01 RX ADMIN — MORPHINE SULFATE 15 MG: 15 TABLET ORAL at 16:04

## 2023-01-01 RX ADMIN — MAGNESIUM HYDROXIDE 30 ML: 1200 LIQUID ORAL at 22:29

## 2023-01-01 RX ADMIN — MORPHINE SULFATE 30 MG: 15 TABLET ORAL at 08:34

## 2023-01-01 RX ADMIN — CEFAZOLIN 2 G: 2 INJECTION, POWDER, FOR SOLUTION INTRAMUSCULAR; INTRAVENOUS at 23:55

## 2023-01-01 RX ADMIN — MORPHINE SULFATE 30 MG: 15 TABLET ORAL at 09:36

## 2023-01-01 RX ADMIN — CEFDINIR 300 MG: 300 CAPSULE ORAL at 21:20

## 2023-01-01 RX ADMIN — SALINE NASAL SPRAY 2 SPRAY: 1.5 SOLUTION NASAL at 17:44

## 2023-01-01 RX ADMIN — OMEPRAZOLE 20 MG: 20 CAPSULE, DELAYED RELEASE ORAL at 08:46

## 2023-01-01 RX ADMIN — PIPERACILLIN AND TAZOBACTAM 4.5 G: 4; .5 INJECTION, POWDER, FOR SOLUTION INTRAVENOUS at 15:09

## 2023-01-01 RX ADMIN — FLUCONAZOLE 100 MG: 100 TABLET ORAL at 08:49

## 2023-01-01 RX ADMIN — OMEPRAZOLE 20 MG: 20 CAPSULE, DELAYED RELEASE ORAL at 20:48

## 2023-01-01 RX ADMIN — SENNOSIDES AND DOCUSATE SODIUM 2 TABLET: 50; 8.6 TABLET ORAL at 17:10

## 2023-01-01 RX ADMIN — CEFDINIR 300 MG: 300 CAPSULE ORAL at 21:30

## 2023-01-01 RX ADMIN — MORPHINE SULFATE 30 MG: 15 TABLET ORAL at 06:37

## 2023-01-01 RX ADMIN — MAGNESIUM HYDROXIDE 30 ML: 1200 LIQUID ORAL at 21:52

## 2023-01-01 RX ADMIN — HYDROMORPHONE HYDROCHLORIDE 0.2 MG: 1 INJECTION, SOLUTION INTRAMUSCULAR; INTRAVENOUS; SUBCUTANEOUS at 17:57

## 2023-01-01 RX ADMIN — FENTANYL CITRATE 150 MCG: 50 INJECTION, SOLUTION INTRAMUSCULAR; INTRAVENOUS at 16:14

## 2023-01-01 RX ADMIN — FUROSEMIDE 20 MG: 10 INJECTION, SOLUTION INTRAVENOUS at 17:38

## 2023-01-01 RX ADMIN — PREDNISONE 40 MG: 20 TABLET ORAL at 05:18

## 2023-01-01 RX ADMIN — ACETAMINOPHEN 650 MG: 325 TABLET ORAL at 05:21

## 2023-01-01 RX ADMIN — OMEPRAZOLE 40 MG: 20 CAPSULE, DELAYED RELEASE ORAL at 08:38

## 2023-01-01 RX ADMIN — MORPHINE SULFATE 30 MG: 15 TABLET ORAL at 00:06

## 2023-01-01 RX ADMIN — CEFAZOLIN 2 G: 2 INJECTION, POWDER, FOR SOLUTION INTRAMUSCULAR; INTRAVENOUS at 22:04

## 2023-01-01 RX ADMIN — PREGABALIN 200 MG: 100 CAPSULE ORAL at 15:06

## 2023-01-01 RX ADMIN — HYDROCORTISONE: 1 CREAM TOPICAL at 08:41

## 2023-01-01 RX ADMIN — ENOXAPARIN SODIUM 40 MG: 100 INJECTION SUBCUTANEOUS at 17:47

## 2023-01-01 RX ADMIN — Medication 200 MCG: at 16:49

## 2023-01-01 RX ADMIN — MORPHINE SULFATE 30 MG: 15 TABLET ORAL at 21:34

## 2023-01-01 RX ADMIN — HYDROMORPHONE HYDROCHLORIDE 0.5 MG: 1 INJECTION, SOLUTION INTRAMUSCULAR; INTRAVENOUS; SUBCUTANEOUS at 11:28

## 2023-01-01 RX ADMIN — MORPHINE SULFATE 30 MG: 15 TABLET ORAL at 09:56

## 2023-01-01 RX ADMIN — CEFAZOLIN 2 G: 2 INJECTION, POWDER, FOR SOLUTION INTRAMUSCULAR; INTRAVENOUS at 22:14

## 2023-01-01 RX ADMIN — HYDROMORPHONE HYDROCHLORIDE 1 MG: 2 INJECTION INTRAMUSCULAR; INTRAVENOUS; SUBCUTANEOUS at 17:06

## 2023-01-01 RX ADMIN — HYDROCORTISONE SODIUM SUCCINATE 50 MG: 100 INJECTION, POWDER, FOR SOLUTION INTRAMUSCULAR; INTRAVENOUS at 17:39

## 2023-01-01 RX ADMIN — MORPHINE SULFATE 30 MG: 30 TABLET, FILM COATED, EXTENDED RELEASE ORAL at 08:35

## 2023-01-01 RX ADMIN — AMIODARONE HYDROCHLORIDE 1 MG/MIN: 1.8 INJECTION, SOLUTION INTRAVENOUS at 20:43

## 2023-01-01 RX ADMIN — DULOXETINE HYDROCHLORIDE 60 MG: 30 CAPSULE, DELAYED RELEASE ORAL at 08:42

## 2023-01-01 RX ADMIN — DULOXETINE HYDROCHLORIDE 60 MG: 30 CAPSULE, DELAYED RELEASE ORAL at 05:53

## 2023-01-01 RX ADMIN — METHOCARBAMOL TABLETS 750 MG: 750 TABLET, COATED ORAL at 21:20

## 2023-01-01 RX ADMIN — MORPHINE SULFATE 15 MG: 15 TABLET, FILM COATED, EXTENDED RELEASE ORAL at 17:48

## 2023-01-01 RX ADMIN — FUROSEMIDE 40 MG: 10 INJECTION, SOLUTION INTRAMUSCULAR; INTRAVENOUS at 13:43

## 2023-01-01 RX ADMIN — LIDOCAINE 2 PATCH: 700 PATCH TOPICAL at 15:46

## 2023-01-01 RX ADMIN — SODIUM CHLORIDE 500 ML: 9 INJECTION, SOLUTION INTRAVENOUS at 19:00

## 2023-01-01 RX ADMIN — MORPHINE SULFATE 30 MG: 30 TABLET, FILM COATED, EXTENDED RELEASE ORAL at 09:15

## 2023-01-01 RX ADMIN — CEFDINIR 300 MG: 300 CAPSULE ORAL at 08:38

## 2023-01-01 RX ADMIN — METHOCARBAMOL 750 MG: 750 TABLET ORAL at 10:24

## 2023-01-01 RX ADMIN — MORPHINE SULFATE 30 MG: 15 TABLET ORAL at 02:35

## 2023-01-01 RX ADMIN — AMIODARONE HYDROCHLORIDE 400 MG: 200 TABLET ORAL at 05:08

## 2023-01-01 RX ADMIN — FUROSEMIDE 40 MG: 10 INJECTION, SOLUTION INTRAVENOUS at 05:55

## 2023-01-01 RX ADMIN — POLYETHYLENE GLYCOL 3350 1 PACKET: 17 POWDER, FOR SOLUTION ORAL at 08:47

## 2023-01-01 RX ADMIN — MORPHINE SULFATE 15 MG: 15 TABLET, FILM COATED, EXTENDED RELEASE ORAL at 05:04

## 2023-01-01 RX ADMIN — MORPHINE SULFATE 30 MG: 15 TABLET ORAL at 00:18

## 2023-01-01 RX ADMIN — ACETAMINOPHEN 1000 MG: 500 TABLET ORAL at 08:41

## 2023-01-01 RX ADMIN — GABAPENTIN 300 MG: 300 CAPSULE ORAL at 19:57

## 2023-01-01 RX ADMIN — MAGNESIUM HYDROXIDE 30 ML: 1200 LIQUID ORAL at 12:39

## 2023-01-01 RX ADMIN — NICOTINE 7 MG: 7 PATCH, EXTENDED RELEASE TRANSDERMAL at 05:25

## 2023-01-01 RX ADMIN — MORPHINE SULFATE 30 MG: 15 TABLET ORAL at 20:34

## 2023-01-01 RX ADMIN — SODIUM CHLORIDE: 9 INJECTION, SOLUTION INTRAVENOUS at 08:17

## 2023-01-01 RX ADMIN — HYDROMORPHONE HYDROCHLORIDE 1 MG: 1 INJECTION, SOLUTION INTRAMUSCULAR; INTRAVENOUS; SUBCUTANEOUS at 17:08

## 2023-01-01 RX ADMIN — HYDROCORTISONE SODIUM SUCCINATE 50 MG: 100 INJECTION, POWDER, FOR SOLUTION INTRAMUSCULAR; INTRAVENOUS at 05:49

## 2023-01-01 RX ADMIN — OXYCODONE HYDROCHLORIDE 15 MG: 15 TABLET ORAL at 13:53

## 2023-01-01 RX ADMIN — POTASSIUM CHLORIDE 40 MEQ: 1500 TABLET, EXTENDED RELEASE ORAL at 17:25

## 2023-01-01 RX ADMIN — CEFAZOLIN 2 G: 2 INJECTION, POWDER, FOR SOLUTION INTRAMUSCULAR; INTRAVENOUS at 05:08

## 2023-01-01 RX ADMIN — CEFAZOLIN 2 G: 2 INJECTION, POWDER, FOR SOLUTION INTRAMUSCULAR; INTRAVENOUS at 05:19

## 2023-01-01 RX ADMIN — MORPHINE SULFATE 30 MG: 15 TABLET ORAL at 15:49

## 2023-01-01 RX ADMIN — MORPHINE SULFATE 30 MG: 30 TABLET, FILM COATED, EXTENDED RELEASE ORAL at 08:39

## 2023-01-01 RX ADMIN — HYDROMORPHONE HYDROCHLORIDE 0.5 MG: 1 INJECTION, SOLUTION INTRAMUSCULAR; INTRAVENOUS; SUBCUTANEOUS at 01:48

## 2023-01-01 RX ADMIN — OMEPRAZOLE 20 MG: 20 CAPSULE, DELAYED RELEASE ORAL at 17:20

## 2023-01-01 RX ADMIN — OMEPRAZOLE 20 MG: 20 CAPSULE, DELAYED RELEASE ORAL at 05:04

## 2023-01-01 RX ADMIN — MORPHINE SULFATE 30 MG: 15 TABLET ORAL at 01:07

## 2023-01-01 RX ADMIN — MORPHINE SULFATE 30 MG: 15 TABLET ORAL at 13:35

## 2023-01-01 RX ADMIN — DEXTROSE AND SODIUM CHLORIDE: 5; 900 INJECTION, SOLUTION INTRAVENOUS at 15:40

## 2023-01-01 RX ADMIN — SODIUM CHLORIDE, POTASSIUM CHLORIDE, SODIUM LACTATE AND CALCIUM CHLORIDE 500 ML: 600; 310; 30; 20 INJECTION, SOLUTION INTRAVENOUS at 14:31

## 2023-01-01 RX ADMIN — ZOLPIDEM TARTRATE 10 MG: 5 TABLET ORAL at 23:44

## 2023-01-01 RX ADMIN — METHYLPREDNISOLONE 4 MG: 4 TABLET ORAL at 08:50

## 2023-01-01 RX ADMIN — MIDAZOLAM HYDROCHLORIDE 1 MG: 1 INJECTION, SOLUTION INTRAMUSCULAR; INTRAVENOUS at 17:26

## 2023-01-01 RX ADMIN — PREGABALIN 150 MG: 150 CAPSULE ORAL at 09:46

## 2023-01-01 RX ADMIN — Medication 950 MG: at 04:54

## 2023-01-01 RX ADMIN — NICOTINE 7 MG: 7 PATCH, EXTENDED RELEASE TRANSDERMAL at 06:03

## 2023-01-01 RX ADMIN — DOCUSATE SODIUM 50 MG AND SENNOSIDES 8.6 MG 2 TABLET: 8.6; 5 TABLET, FILM COATED ORAL at 05:33

## 2023-01-01 RX ADMIN — MORPHINE SULFATE 15 MG: 15 TABLET, FILM COATED, EXTENDED RELEASE ORAL at 17:10

## 2023-01-01 RX ADMIN — OMEPRAZOLE 20 MG: 20 CAPSULE, DELAYED RELEASE ORAL at 05:12

## 2023-01-01 RX ADMIN — DEXTROSE MONOHYDRATE 25 G: 100 INJECTION, SOLUTION INTRAVENOUS at 11:48

## 2023-01-01 RX ADMIN — SODIUM CHLORIDE 1 G: 1 TABLET ORAL at 18:23

## 2023-01-01 RX ADMIN — ACETAMINOPHEN 975 MG: 325 TABLET, FILM COATED ORAL at 21:34

## 2023-01-01 RX ADMIN — DULOXETINE HYDROCHLORIDE 60 MG: 30 CAPSULE, DELAYED RELEASE ORAL at 08:25

## 2023-01-01 RX ADMIN — THERA TABS 1 TABLET: TAB at 08:20

## 2023-01-01 RX ADMIN — DULOXETINE HYDROCHLORIDE 60 MG: 60 CAPSULE, DELAYED RELEASE ORAL at 05:21

## 2023-01-01 RX ADMIN — MORPHINE SULFATE 30 MG: 15 TABLET ORAL at 07:49

## 2023-01-01 RX ADMIN — THIAMINE HCL TAB 100 MG 100 MG: 100 TAB at 08:34

## 2023-01-01 RX ADMIN — DOCUSATE SODIUM 50 MG AND SENNOSIDES 8.6 MG 2 TABLET: 8.6; 5 TABLET, FILM COATED ORAL at 06:43

## 2023-01-01 RX ADMIN — POLYETHYLENE GLYCOL 3350 1 PACKET: 17 POWDER, FOR SOLUTION ORAL at 22:12

## 2023-01-01 RX ADMIN — MORPHINE SULFATE 30 MG: 15 TABLET ORAL at 13:39

## 2023-01-01 RX ADMIN — OMEPRAZOLE 40 MG: 20 CAPSULE, DELAYED RELEASE ORAL at 08:39

## 2023-01-01 RX ADMIN — OMEPRAZOLE 20 MG: 20 CAPSULE, DELAYED RELEASE ORAL at 08:55

## 2023-01-01 RX ADMIN — HEPARIN SODIUM 7600 UNITS: 1000 INJECTION, SOLUTION INTRAVENOUS; SUBCUTANEOUS at 22:14

## 2023-01-01 RX ADMIN — SENNOSIDES AND DOCUSATE SODIUM 2 TABLET: 50; 8.6 TABLET ORAL at 08:40

## 2023-01-01 RX ADMIN — METHOCARBAMOL TABLETS 750 MG: 750 TABLET, COATED ORAL at 01:12

## 2023-01-01 RX ADMIN — POTASSIUM CHLORIDE 40 MEQ: 1500 TABLET, EXTENDED RELEASE ORAL at 18:45

## 2023-01-01 RX ADMIN — DULOXETINE HYDROCHLORIDE 60 MG: 60 CAPSULE, DELAYED RELEASE ORAL at 05:42

## 2023-01-01 RX ADMIN — FUROSEMIDE 80 MG: 10 INJECTION, SOLUTION INTRAMUSCULAR; INTRAVENOUS at 05:28

## 2023-01-01 RX ADMIN — HYDROCORTISONE: 1 CREAM TOPICAL at 15:26

## 2023-01-01 RX ADMIN — THIAMINE HCL TAB 100 MG 100 MG: 100 TAB at 04:47

## 2023-01-01 RX ADMIN — LINEZOLID 600 MG: 600 INJECTION, SOLUTION INTRAVENOUS at 17:39

## 2023-01-01 RX ADMIN — DOCUSATE SODIUM 50 MG AND SENNOSIDES 8.6 MG 2 TABLET: 8.6; 5 TABLET, FILM COATED ORAL at 17:47

## 2023-01-01 RX ADMIN — PREGABALIN 150 MG: 150 CAPSULE ORAL at 21:26

## 2023-01-01 RX ADMIN — MORPHINE SULFATE 30 MG: 15 TABLET ORAL at 11:31

## 2023-01-01 RX ADMIN — MORPHINE SULFATE 15 MG: 15 TABLET, FILM COATED, EXTENDED RELEASE ORAL at 05:51

## 2023-01-01 RX ADMIN — OXYCODONE HYDROCHLORIDE 10 MG: 5 TABLET ORAL at 04:36

## 2023-01-01 RX ADMIN — MORPHINE SULFATE 30 MG: 15 TABLET ORAL at 00:10

## 2023-01-01 RX ADMIN — OMEPRAZOLE 20 MG: 20 CAPSULE, DELAYED RELEASE ORAL at 08:36

## 2023-01-01 RX ADMIN — AMIODARONE HYDROCHLORIDE 400 MG: 200 TABLET ORAL at 17:13

## 2023-01-01 RX ADMIN — ALBUTEROL SULFATE 2.5 MG: 2.5 SOLUTION RESPIRATORY (INHALATION) at 06:39

## 2023-01-01 RX ADMIN — ACETAMINOPHEN 650 MG: 325 TABLET ORAL at 08:12

## 2023-01-01 RX ADMIN — OXYCODONE HYDROCHLORIDE 10 MG: 10 TABLET ORAL at 11:33

## 2023-01-01 RX ADMIN — CALCIUM GLUCONATE 1 G: 20 INJECTION, SOLUTION INTRAVENOUS at 11:11

## 2023-01-01 RX ADMIN — PREGABALIN 150 MG: 150 CAPSULE ORAL at 14:31

## 2023-01-01 RX ADMIN — GABAPENTIN 300 MG: 300 CAPSULE ORAL at 11:21

## 2023-01-01 RX ADMIN — PREGABALIN 200 MG: 100 CAPSULE ORAL at 15:11

## 2023-01-01 RX ADMIN — IOHEXOL 100 ML: 350 INJECTION, SOLUTION INTRAVENOUS at 16:45

## 2023-01-01 RX ADMIN — MICONAZOLE NITRATE: 20 CREAM TOPICAL at 21:09

## 2023-01-01 RX ADMIN — SENNOSIDES AND DOCUSATE SODIUM 2 TABLET: 50; 8.6 TABLET ORAL at 21:11

## 2023-01-01 RX ADMIN — SIMETHICONE 125 MG: 125 TABLET, CHEWABLE ORAL at 19:57

## 2023-01-01 RX ADMIN — ACETAMINOPHEN 650 MG: 325 TABLET ORAL at 12:10

## 2023-01-01 RX ADMIN — SPIRONOLACTONE 25 MG: 25 TABLET ORAL at 06:32

## 2023-01-01 RX ADMIN — DULOXETINE HYDROCHLORIDE 60 MG: 30 CAPSULE, DELAYED RELEASE ORAL at 08:44

## 2023-01-01 RX ADMIN — THIAMINE HCL TAB 100 MG 100 MG: 100 TAB at 05:32

## 2023-01-01 RX ADMIN — MORPHINE SULFATE 30 MG: 15 TABLET ORAL at 13:05

## 2023-01-01 RX ADMIN — Medication 3 MG: at 20:25

## 2023-01-01 RX ADMIN — OXYCODONE HYDROCHLORIDE 10 MG: 5 SOLUTION ORAL at 10:53

## 2023-01-01 RX ADMIN — HYDROMORPHONE HYDROCHLORIDE 0.5 MG: 1 INJECTION, SOLUTION INTRAMUSCULAR; INTRAVENOUS; SUBCUTANEOUS at 16:42

## 2023-01-01 RX ADMIN — DULOXETINE HYDROCHLORIDE 60 MG: 30 CAPSULE, DELAYED RELEASE ORAL at 05:10

## 2023-01-01 RX ADMIN — HYDROMORPHONE HYDROCHLORIDE 0.5 MG: 1 INJECTION, SOLUTION INTRAMUSCULAR; INTRAVENOUS; SUBCUTANEOUS at 08:28

## 2023-01-01 RX ADMIN — Medication 1 APPLICATOR: at 23:36

## 2023-01-01 RX ADMIN — DOCUSATE SODIUM 50 MG AND SENNOSIDES 8.6 MG 2 TABLET: 8.6; 5 TABLET, FILM COATED ORAL at 17:35

## 2023-01-01 RX ADMIN — THIAMINE HCL TAB 100 MG 100 MG: 100 TAB at 04:54

## 2023-01-01 RX ADMIN — IPRATROPIUM BROMIDE AND ALBUTEROL SULFATE 3 ML: .5; 2.5 SOLUTION RESPIRATORY (INHALATION) at 18:15

## 2023-01-01 RX ADMIN — MORPHINE SULFATE 15 MG: 15 TABLET, FILM COATED, EXTENDED RELEASE ORAL at 08:55

## 2023-01-01 RX ADMIN — MORPHINE SULFATE 15 MG: 15 TABLET, FILM COATED, EXTENDED RELEASE ORAL at 16:13

## 2023-01-01 RX ADMIN — OMEPRAZOLE 20 MG: 20 CAPSULE, DELAYED RELEASE ORAL at 08:59

## 2023-01-01 RX ADMIN — FUROSEMIDE 60 MG: 10 INJECTION, SOLUTION INTRAMUSCULAR; INTRAVENOUS at 22:42

## 2023-01-01 RX ADMIN — HYDROMORPHONE HYDROCHLORIDE 0.5 MG: 1 INJECTION, SOLUTION INTRAMUSCULAR; INTRAVENOUS; SUBCUTANEOUS at 22:37

## 2023-01-01 RX ADMIN — CEFEPIME 2 G: 2 INJECTION, POWDER, FOR SOLUTION INTRAVENOUS at 22:07

## 2023-01-01 RX ADMIN — MORPHINE SULFATE 15 MG: 15 TABLET, FILM COATED, EXTENDED RELEASE ORAL at 22:09

## 2023-01-01 RX ADMIN — CEFAZOLIN 2 G: 2 INJECTION, POWDER, FOR SOLUTION INTRAMUSCULAR; INTRAVENOUS at 14:34

## 2023-01-01 RX ADMIN — SENNOSIDES AND DOCUSATE SODIUM 2 TABLET: 50; 8.6 TABLET ORAL at 08:50

## 2023-01-01 RX ADMIN — PREGABALIN 200 MG: 100 CAPSULE ORAL at 21:12

## 2023-01-01 RX ADMIN — MIDODRINE HYDROCHLORIDE 5 MG: 5 TABLET ORAL at 11:26

## 2023-01-01 RX ADMIN — SENNOSIDES AND DOCUSATE SODIUM 2 TABLET: 50; 8.6 TABLET ORAL at 08:27

## 2023-01-01 RX ADMIN — PREGABALIN 100 MG: 100 CAPSULE ORAL at 21:26

## 2023-01-01 RX ADMIN — MORPHINE SULFATE 15 MG: 15 TABLET, FILM COATED, EXTENDED RELEASE ORAL at 20:48

## 2023-01-01 RX ADMIN — FUROSEMIDE 40 MG: 10 INJECTION, SOLUTION INTRAVENOUS at 05:25

## 2023-01-01 RX ADMIN — METHOCARBAMOL 750 MG: 750 TABLET ORAL at 01:45

## 2023-01-01 RX ADMIN — Medication 2000 UNITS: at 08:56

## 2023-01-01 RX ADMIN — MORPHINE SULFATE 30 MG: 15 TABLET ORAL at 23:37

## 2023-01-01 RX ADMIN — FUROSEMIDE 40 MG: 10 INJECTION, SOLUTION INTRAVENOUS at 17:21

## 2023-01-01 RX ADMIN — THERA TABS 1 TABLET: TAB at 08:27

## 2023-01-01 RX ADMIN — CEFAZOLIN 2 G: 2 INJECTION, POWDER, FOR SOLUTION INTRAMUSCULAR; INTRAVENOUS at 17:06

## 2023-01-01 RX ADMIN — FENTANYL CITRATE 50 MCG: 50 INJECTION, SOLUTION INTRAMUSCULAR; INTRAVENOUS at 15:39

## 2023-01-01 RX ADMIN — ACETAMINOPHEN 1000 MG: 500 TABLET ORAL at 20:42

## 2023-01-01 RX ADMIN — ZOLPIDEM TARTRATE 5 MG: 5 TABLET ORAL at 00:06

## 2023-01-01 RX ADMIN — ENOXAPARIN SODIUM 40 MG: 100 INJECTION SUBCUTANEOUS at 17:27

## 2023-01-01 RX ADMIN — MINERAL OIL, PETROLATUM 1 APPLICATION: 425; 573 OINTMENT OPHTHALMIC at 15:33

## 2023-01-01 RX ADMIN — MORPHINE SULFATE 30 MG: 15 TABLET ORAL at 15:59

## 2023-01-01 RX ADMIN — MORPHINE SULFATE 30 MG: 30 TABLET, FILM COATED, EXTENDED RELEASE ORAL at 21:44

## 2023-01-01 RX ADMIN — PREGABALIN 150 MG: 150 CAPSULE ORAL at 14:02

## 2023-01-01 RX ADMIN — MICONAZOLE NITRATE: 20 CREAM TOPICAL at 18:06

## 2023-01-01 RX ADMIN — THIAMINE HYDROCHLORIDE 100 MG: 100 INJECTION, SOLUTION INTRAMUSCULAR; INTRAVENOUS at 05:13

## 2023-01-01 RX ADMIN — MICONAZOLE NITRATE: 20 CREAM TOPICAL at 22:07

## 2023-01-01 RX ADMIN — GABAPENTIN 300 MG: 300 CAPSULE ORAL at 14:36

## 2023-01-01 RX ADMIN — FLUCONAZOLE 200 MG: 200 TABLET ORAL at 08:20

## 2023-01-01 RX ADMIN — DOCUSATE SODIUM 50 MG AND SENNOSIDES 8.6 MG 2 TABLET: 8.6; 5 TABLET, FILM COATED ORAL at 17:08

## 2023-01-01 RX ADMIN — HYDROMORPHONE HYDROCHLORIDE 0.5 MG: 1 INJECTION, SOLUTION INTRAMUSCULAR; INTRAVENOUS; SUBCUTANEOUS at 00:27

## 2023-01-01 RX ADMIN — HYDROMORPHONE HYDROCHLORIDE 0.5 MG: 1 INJECTION, SOLUTION INTRAMUSCULAR; INTRAVENOUS; SUBCUTANEOUS at 22:26

## 2023-01-01 RX ADMIN — SENNOSIDES AND DOCUSATE SODIUM 2 TABLET: 8.6; 5 TABLET ORAL at 20:19

## 2023-01-01 RX ADMIN — METHOCARBAMOL TABLETS 750 MG: 750 TABLET, COATED ORAL at 22:16

## 2023-01-01 RX ADMIN — SIMETHICONE 125 MG: 125 TABLET, CHEWABLE ORAL at 08:31

## 2023-01-01 RX ADMIN — PREGABALIN 200 MG: 100 CAPSULE ORAL at 08:26

## 2023-01-01 RX ADMIN — OMEPRAZOLE 40 MG: 20 CAPSULE, DELAYED RELEASE ORAL at 07:50

## 2023-01-01 RX ADMIN — BUPIVACAINE HYDROCHLORIDE AND EPINEPHRINE 30 ML: 2.5; 5 INJECTION, SOLUTION EPIDURAL; INFILTRATION; INTRACAUDAL; PERINEURAL at 14:28

## 2023-01-01 RX ADMIN — CEFAZOLIN 2 G: 2 INJECTION, POWDER, FOR SOLUTION INTRAMUSCULAR; INTRAVENOUS at 13:41

## 2023-01-01 RX ADMIN — SENNOSIDES AND DOCUSATE SODIUM 2 TABLET: 50; 8.6 TABLET ORAL at 08:30

## 2023-01-01 RX ADMIN — METHOCARBAMOL TABLETS 750 MG: 750 TABLET, COATED ORAL at 09:01

## 2023-01-01 RX ADMIN — CEFAZOLIN 2 G: 2 INJECTION, POWDER, FOR SOLUTION INTRAMUSCULAR; INTRAVENOUS at 22:50

## 2023-01-01 RX ADMIN — MORPHINE SULFATE 15 MG: 15 TABLET, FILM COATED, EXTENDED RELEASE ORAL at 21:02

## 2023-01-01 RX ADMIN — ACETAMINOPHEN 1000 MG: 500 TABLET ORAL at 20:26

## 2023-01-01 RX ADMIN — THERA TABS 1 TABLET: TAB at 08:26

## 2023-01-01 RX ADMIN — OMEPRAZOLE 20 MG: 20 CAPSULE, DELAYED RELEASE ORAL at 06:12

## 2023-01-01 RX ADMIN — BISACODYL 10 MG: 10 SUPPOSITORY RECTAL at 16:33

## 2023-01-01 RX ADMIN — ACETAMINOPHEN 650 MG: 325 TABLET, FILM COATED ORAL at 11:11

## 2023-01-01 RX ADMIN — ACETAMINOPHEN 650 MG: 325 TABLET, FILM COATED ORAL at 17:38

## 2023-01-01 RX ADMIN — MORPHINE SULFATE 30 MG: 15 TABLET ORAL at 13:09

## 2023-01-01 RX ADMIN — ALBUTEROL SULFATE 2.5 MG: 2.5 SOLUTION RESPIRATORY (INHALATION) at 22:29

## 2023-01-01 RX ADMIN — MORPHINE SULFATE 15 MG: 15 TABLET, FILM COATED, EXTENDED RELEASE ORAL at 21:23

## 2023-01-01 RX ADMIN — OXYCODONE HYDROCHLORIDE 15 MG: 15 TABLET ORAL at 11:45

## 2023-01-01 RX ADMIN — ENOXAPARIN SODIUM 40 MG: 100 INJECTION SUBCUTANEOUS at 17:30

## 2023-01-01 RX ADMIN — MORPHINE SULFATE 30 MG: 15 TABLET ORAL at 05:29

## 2023-01-01 RX ADMIN — POTASSIUM CHLORIDE 10 MEQ: 1500 TABLET, EXTENDED RELEASE ORAL at 08:40

## 2023-01-01 RX ADMIN — MORPHINE SULFATE 30 MG: 15 TABLET ORAL at 04:08

## 2023-01-01 RX ADMIN — SENNOSIDES AND DOCUSATE SODIUM 2 TABLET: 50; 8.6 TABLET ORAL at 08:20

## 2023-01-01 RX ADMIN — CEFEPIME 2 G: 2 INJECTION, POWDER, FOR SOLUTION INTRAVENOUS at 05:25

## 2023-01-01 RX ADMIN — OMEPRAZOLE 40 MG: 20 CAPSULE, DELAYED RELEASE ORAL at 08:55

## 2023-01-01 RX ADMIN — MORPHINE SULFATE 30 MG: 15 TABLET ORAL at 16:20

## 2023-01-01 RX ADMIN — MORPHINE SULFATE 30 MG: 30 TABLET, FILM COATED, EXTENDED RELEASE ORAL at 08:55

## 2023-01-01 RX ADMIN — ZOLPIDEM TARTRATE 5 MG: 5 TABLET ORAL at 23:37

## 2023-01-01 RX ADMIN — MORPHINE SULFATE 15 MG: 15 TABLET, FILM COATED, EXTENDED RELEASE ORAL at 20:43

## 2023-01-01 RX ADMIN — DIBASIC SODIUM PHOSPHATE, MONOBASIC POTASSIUM PHOSPHATE AND MONOBASIC SODIUM PHOSPHATE 500 MG: 852; 155; 130 TABLET ORAL at 10:30

## 2023-01-01 RX ADMIN — HYDROCORTISONE SODIUM SUCCINATE 25 MG: 100 INJECTION, POWDER, FOR SOLUTION INTRAMUSCULAR; INTRAVENOUS at 05:18

## 2023-01-01 RX ADMIN — ACETAMINOPHEN 650 MG: 325 TABLET, FILM COATED ORAL at 04:54

## 2023-01-01 RX ADMIN — OXYCODONE HYDROCHLORIDE 10 MG: 10 TABLET ORAL at 14:01

## 2023-01-01 RX ADMIN — METHOCARBAMOL TABLETS 750 MG: 750 TABLET, COATED ORAL at 16:00

## 2023-01-01 RX ADMIN — PREGABALIN 150 MG: 150 CAPSULE ORAL at 09:43

## 2023-01-01 RX ADMIN — MORPHINE SULFATE 30 MG: 15 TABLET ORAL at 17:16

## 2023-01-01 RX ADMIN — OMEPRAZOLE 20 MG: 20 CAPSULE, DELAYED RELEASE ORAL at 17:47

## 2023-01-01 RX ADMIN — MORPHINE SULFATE 30 MG: 15 TABLET ORAL at 06:58

## 2023-01-01 RX ADMIN — PREGABALIN 100 MG: 100 CAPSULE ORAL at 21:39

## 2023-01-01 RX ADMIN — KETOCONAZOLE CREAM, 2%: 20 CREAM TOPICAL at 05:26

## 2023-01-01 RX ADMIN — PREGABALIN 150 MG: 150 CAPSULE ORAL at 08:54

## 2023-01-01 RX ADMIN — SODIUM HYPOCHLORITE 1 ML: 1.25 SOLUTION TOPICAL at 17:13

## 2023-01-01 RX ADMIN — FUROSEMIDE 80 MG: 10 INJECTION, SOLUTION INTRAMUSCULAR; INTRAVENOUS at 21:01

## 2023-01-01 RX ADMIN — GABAPENTIN 300 MG: 300 CAPSULE ORAL at 14:21

## 2023-01-01 RX ADMIN — DAPAGLIFLOZIN 10 MG: 10 TABLET, FILM COATED ORAL at 08:35

## 2023-01-01 RX ADMIN — OXYCODONE HYDROCHLORIDE 15 MG: 15 TABLET ORAL at 22:08

## 2023-01-01 RX ADMIN — HYDROCORTISONE: 1 CREAM TOPICAL at 21:11

## 2023-01-01 RX ADMIN — PREDNISONE 10 MG: 5 TABLET ORAL at 08:34

## 2023-01-01 RX ADMIN — MICONAZOLE NITRATE: 20 CREAM TOPICAL at 08:49

## 2023-01-01 RX ADMIN — DEXTROSE AND SODIUM CHLORIDE: 5; 900 INJECTION, SOLUTION INTRAVENOUS at 12:22

## 2023-01-01 RX ADMIN — DOCUSATE SODIUM 50 MG AND SENNOSIDES 8.6 MG 2 TABLET: 8.6; 5 TABLET, FILM COATED ORAL at 17:46

## 2023-01-01 RX ADMIN — HUMAN ALBUMIN MICROSPHERES AND PERFLUTREN 3 ML: 10; .22 INJECTION, SOLUTION INTRAVENOUS at 13:07

## 2023-01-01 RX ADMIN — MORPHINE SULFATE 30 MG: 15 TABLET ORAL at 16:14

## 2023-01-01 RX ADMIN — GABAPENTIN 300 MG: 300 CAPSULE ORAL at 06:43

## 2023-01-01 RX ADMIN — MORPHINE SULFATE 30 MG: 15 TABLET ORAL at 11:05

## 2023-01-01 RX ADMIN — HYDROCORTISONE SODIUM SUCCINATE 50 MG: 100 INJECTION, POWDER, FOR SOLUTION INTRAMUSCULAR; INTRAVENOUS at 06:29

## 2023-01-01 RX ADMIN — PREDNISONE 40 MG: 20 TABLET ORAL at 05:59

## 2023-01-01 RX ADMIN — FENTANYL CITRATE 50 MCG: 50 INJECTION, SOLUTION INTRAMUSCULAR; INTRAVENOUS at 17:20

## 2023-01-01 RX ADMIN — PREGABALIN 100 MG: 100 CAPSULE ORAL at 08:24

## 2023-01-01 RX ADMIN — ONDANSETRON 4 MG: 2 INJECTION INTRAMUSCULAR; INTRAVENOUS at 10:20

## 2023-01-01 RX ADMIN — ERGOCALCIFEROL 50000 UNITS: 1.25 CAPSULE ORAL at 09:53

## 2023-01-01 RX ADMIN — Medication 2000 UNITS: at 08:35

## 2023-01-01 RX ADMIN — DOCUSATE SODIUM 50 MG AND SENNOSIDES 8.6 MG 2 TABLET: 8.6; 5 TABLET, FILM COATED ORAL at 06:16

## 2023-01-01 RX ADMIN — HEPARIN SODIUM 3500 UNITS: 1000 INJECTION, SOLUTION INTRAVENOUS; SUBCUTANEOUS at 22:43

## 2023-01-01 RX ADMIN — HUMAN ALBUMIN MICROSPHERES AND PERFLUTREN 3 ML: 10; .22 INJECTION, SOLUTION INTRAVENOUS at 16:45

## 2023-01-01 RX ADMIN — HYDROCORTISONE: 1 CREAM TOPICAL at 08:35

## 2023-01-01 RX ADMIN — OXYCODONE HYDROCHLORIDE 10 MG: 5 TABLET ORAL at 06:44

## 2023-01-01 RX ADMIN — MORPHINE SULFATE 15 MG: 15 TABLET, FILM COATED, EXTENDED RELEASE ORAL at 08:30

## 2023-01-01 RX ADMIN — CEFEPIME 2 G: 2 INJECTION, POWDER, FOR SOLUTION INTRAVENOUS at 21:44

## 2023-01-01 RX ADMIN — MORPHINE SULFATE 30 MG: 15 TABLET ORAL at 14:23

## 2023-01-01 RX ADMIN — OXYCODONE HYDROCHLORIDE 15 MG: 15 TABLET ORAL at 05:17

## 2023-01-01 RX ADMIN — PREGABALIN 150 MG: 150 CAPSULE ORAL at 20:19

## 2023-01-01 RX ADMIN — NICOTINE 7 MG: 7 PATCH, EXTENDED RELEASE TRANSDERMAL at 05:38

## 2023-01-01 RX ADMIN — OMEPRAZOLE 40 MG: 20 CAPSULE, DELAYED RELEASE ORAL at 08:17

## 2023-01-01 RX ADMIN — MAGNESIUM HYDROXIDE 30 ML: 1200 LIQUID ORAL at 14:51

## 2023-01-01 RX ADMIN — LIDOCAINE 2 PATCH: 700 PATCH TOPICAL at 15:51

## 2023-01-01 RX ADMIN — MORPHINE SULFATE 30 MG: 30 TABLET, FILM COATED, EXTENDED RELEASE ORAL at 10:05

## 2023-01-01 RX ADMIN — PREGABALIN 150 MG: 150 CAPSULE ORAL at 14:17

## 2023-01-01 RX ADMIN — Medication 2000 UNITS: at 08:06

## 2023-01-01 RX ADMIN — CEFAZOLIN 2 G: 2 INJECTION, POWDER, FOR SOLUTION INTRAMUSCULAR; INTRAVENOUS at 17:10

## 2023-01-01 RX ADMIN — LINEZOLID 600 MG: 600 TABLET, FILM COATED ORAL at 17:11

## 2023-01-01 RX ADMIN — POTASSIUM CHLORIDE 40 MEQ: 1500 TABLET, EXTENDED RELEASE ORAL at 17:20

## 2023-01-01 RX ADMIN — CEFAZOLIN 2 G: 2 INJECTION, POWDER, FOR SOLUTION INTRAMUSCULAR; INTRAVENOUS at 21:27

## 2023-01-01 RX ADMIN — DAPAGLIFLOZIN 10 MG: 10 TABLET, FILM COATED ORAL at 08:12

## 2023-01-01 RX ADMIN — SENNOSIDES AND DOCUSATE SODIUM 2 TABLET: 50; 8.6 TABLET ORAL at 20:57

## 2023-01-01 RX ADMIN — FUROSEMIDE 40 MG: 10 INJECTION, SOLUTION INTRAMUSCULAR; INTRAVENOUS at 17:12

## 2023-01-01 RX ADMIN — SENNOSIDES AND DOCUSATE SODIUM 2 TABLET: 50; 8.6 TABLET ORAL at 06:00

## 2023-01-01 RX ADMIN — MORPHINE SULFATE 30 MG: 15 TABLET ORAL at 21:15

## 2023-01-01 RX ADMIN — MORPHINE SULFATE 30 MG: 15 TABLET ORAL at 13:33

## 2023-01-01 RX ADMIN — CEFAZOLIN 2 G: 2 INJECTION, POWDER, FOR SOLUTION INTRAMUSCULAR; INTRAVENOUS at 14:16

## 2023-01-01 RX ADMIN — ZOLPIDEM TARTRATE 5 MG: 5 TABLET ORAL at 22:06

## 2023-01-01 RX ADMIN — VANCOMYCIN HYDROCHLORIDE 2500 MG: 5 INJECTION, POWDER, LYOPHILIZED, FOR SOLUTION INTRAVENOUS at 01:16

## 2023-01-01 RX ADMIN — GABAPENTIN 300 MG: 300 CAPSULE ORAL at 14:16

## 2023-01-01 RX ADMIN — PREGABALIN 150 MG: 150 CAPSULE ORAL at 08:59

## 2023-01-01 RX ADMIN — OMEPRAZOLE 40 MG: 20 CAPSULE, DELAYED RELEASE ORAL at 09:10

## 2023-01-01 RX ADMIN — LACTULOSE 30 ML: 20 SOLUTION ORAL at 17:13

## 2023-01-01 RX ADMIN — MORPHINE SULFATE 30 MG: 15 TABLET ORAL at 23:12

## 2023-01-01 RX ADMIN — MICONAZOLE NITRATE: 20 CREAM TOPICAL at 22:08

## 2023-01-01 RX ADMIN — ACETAMINOPHEN 650 MG: 325 TABLET ORAL at 23:43

## 2023-01-01 RX ADMIN — MORPHINE SULFATE 15 MG: 15 TABLET, FILM COATED, EXTENDED RELEASE ORAL at 08:14

## 2023-01-01 RX ADMIN — MORPHINE SULFATE 15 MG: 15 TABLET, FILM COATED, EXTENDED RELEASE ORAL at 20:25

## 2023-01-01 RX ADMIN — MORPHINE SULFATE 30 MG: 15 TABLET ORAL at 08:50

## 2023-01-01 RX ADMIN — SIMETHICONE 125 MG: 125 TABLET, CHEWABLE ORAL at 14:36

## 2023-01-01 RX ADMIN — HYDROMORPHONE HYDROCHLORIDE 1 MG: 1 INJECTION, SOLUTION INTRAMUSCULAR; INTRAVENOUS; SUBCUTANEOUS at 05:02

## 2023-01-01 RX ADMIN — HYDROMORPHONE HYDROCHLORIDE 0.5 MG: 1 INJECTION, SOLUTION INTRAMUSCULAR; INTRAVENOUS; SUBCUTANEOUS at 07:34

## 2023-01-01 RX ADMIN — MORPHINE SULFATE 30 MG: 15 TABLET ORAL at 12:36

## 2023-01-01 RX ADMIN — NICOTINE 7 MG: 7 PATCH TRANSDERMAL at 05:20

## 2023-01-01 RX ADMIN — SENNOSIDES AND DOCUSATE SODIUM 2 TABLET: 50; 8.6 TABLET ORAL at 20:56

## 2023-01-01 RX ADMIN — MORPHINE SULFATE 30 MG: 30 TABLET, FILM COATED, EXTENDED RELEASE ORAL at 17:45

## 2023-01-01 RX ADMIN — DULOXETINE HYDROCHLORIDE 60 MG: 60 CAPSULE, DELAYED RELEASE ORAL at 05:23

## 2023-01-01 RX ADMIN — SENNOSIDES AND DOCUSATE SODIUM 2 TABLET: 50; 8.6 TABLET ORAL at 08:21

## 2023-01-01 RX ADMIN — MORPHINE SULFATE 30 MG: 15 TABLET ORAL at 20:59

## 2023-01-01 RX ADMIN — OMEPRAZOLE 40 MG: 20 CAPSULE, DELAYED RELEASE ORAL at 05:14

## 2023-01-01 RX ADMIN — SENNOSIDES AND DOCUSATE SODIUM 2 TABLET: 50; 8.6 TABLET ORAL at 20:42

## 2023-01-01 RX ADMIN — MORPHINE SULFATE 15 MG: 15 TABLET, FILM COATED, EXTENDED RELEASE ORAL at 05:09

## 2023-01-01 RX ADMIN — MORPHINE SULFATE 30 MG: 15 TABLET ORAL at 21:10

## 2023-01-01 RX ADMIN — HYDROMORPHONE HYDROCHLORIDE 0.5 MG: 1 INJECTION, SOLUTION INTRAMUSCULAR; INTRAVENOUS; SUBCUTANEOUS at 01:59

## 2023-01-01 RX ADMIN — SODIUM CHLORIDE, POTASSIUM CHLORIDE, SODIUM LACTATE AND CALCIUM CHLORIDE: 600; 310; 30; 20 INJECTION, SOLUTION INTRAVENOUS at 08:39

## 2023-01-01 RX ADMIN — OXYCODONE HYDROCHLORIDE 15 MG: 15 TABLET ORAL at 20:29

## 2023-01-01 RX ADMIN — HYDROMORPHONE HYDROCHLORIDE 0.5 MG: 1 INJECTION, SOLUTION INTRAMUSCULAR; INTRAVENOUS; SUBCUTANEOUS at 23:33

## 2023-01-01 RX ADMIN — AMOXICILLIN AND CLAVULANATE POTASSIUM 1 TABLET: 875; 125 TABLET, FILM COATED ORAL at 06:31

## 2023-01-01 RX ADMIN — SODIUM HYPOCHLORITE 473 ML: 1.25 SOLUTION TOPICAL at 05:50

## 2023-01-01 RX ADMIN — HYDROMORPHONE HYDROCHLORIDE 1 MG: 1 INJECTION, SOLUTION INTRAMUSCULAR; INTRAVENOUS; SUBCUTANEOUS at 21:59

## 2023-01-01 RX ADMIN — SALINE NASAL SPRAY 2 SPRAY: 1.5 SOLUTION NASAL at 20:44

## 2023-01-01 RX ADMIN — POTASSIUM CHLORIDE 20 MEQ: 14.9 INJECTION, SOLUTION INTRAVENOUS at 06:10

## 2023-01-01 RX ADMIN — MORPHINE SULFATE 30 MG: 30 TABLET, FILM COATED, EXTENDED RELEASE ORAL at 21:21

## 2023-01-01 RX ADMIN — MORPHINE SULFATE 15 MG: 15 TABLET, FILM COATED, EXTENDED RELEASE ORAL at 08:27

## 2023-01-01 RX ADMIN — ALBUTEROL SULFATE 2.5 MG: 2.5 SOLUTION RESPIRATORY (INHALATION) at 13:57

## 2023-01-01 RX ADMIN — MORPHINE SULFATE 30 MG: 15 TABLET ORAL at 23:54

## 2023-01-01 RX ADMIN — CARBOXYMETHYLCELLULOSE SODIUM 1 DROP: 5 SOLUTION/ DROPS OPHTHALMIC at 23:37

## 2023-01-01 RX ADMIN — MAGNESIUM SULFATE HEPTAHYDRATE 4 G: 40 INJECTION, SOLUTION INTRAVENOUS at 11:32

## 2023-01-01 RX ADMIN — HYDROMORPHONE HYDROCHLORIDE 0.4 MG: 1 INJECTION, SOLUTION INTRAMUSCULAR; INTRAVENOUS; SUBCUTANEOUS at 17:43

## 2023-01-01 RX ADMIN — OMEPRAZOLE 20 MG: 20 CAPSULE, DELAYED RELEASE ORAL at 08:20

## 2023-01-01 RX ADMIN — HYDROCORTISONE SODIUM SUCCINATE 50 MG: 100 INJECTION, POWDER, FOR SOLUTION INTRAMUSCULAR; INTRAVENOUS at 11:48

## 2023-01-01 RX ADMIN — METHYLPREDNISOLONE 4 MG: 4 TABLET ORAL at 11:48

## 2023-01-01 RX ADMIN — SALINE NASAL SPRAY 2 SPRAY: 1.5 SOLUTION NASAL at 15:19

## 2023-01-01 RX ADMIN — HYDROCORTISONE SODIUM SUCCINATE 25 MG: 100 INJECTION, POWDER, FOR SOLUTION INTRAMUSCULAR; INTRAVENOUS at 16:04

## 2023-01-01 RX ADMIN — NOREPINEPHRINE BITARTRATE 0.1 MCG/KG/MIN: 1 INJECTION, SOLUTION, CONCENTRATE INTRAVENOUS at 14:36

## 2023-01-01 RX ADMIN — HYDROCORTISONE: 1 CREAM TOPICAL at 21:15

## 2023-01-01 RX ADMIN — SODIUM CHLORIDE: 9 INJECTION, SOLUTION INTRAVENOUS at 10:15

## 2023-01-01 RX ADMIN — Medication 3 MG: at 20:42

## 2023-01-01 RX ADMIN — CEFDINIR 300 MG: 300 CAPSULE ORAL at 21:31

## 2023-01-01 RX ADMIN — MORPHINE SULFATE 30 MG: 15 TABLET ORAL at 00:17

## 2023-01-01 RX ADMIN — PREGABALIN 150 MG: 150 CAPSULE ORAL at 09:06

## 2023-01-01 RX ADMIN — MAGNESIUM HYDROXIDE 30 ML: 1200 LIQUID ORAL at 17:44

## 2023-01-01 RX ADMIN — OMEPRAZOLE 40 MG: 20 CAPSULE, DELAYED RELEASE ORAL at 05:59

## 2023-01-01 RX ADMIN — HYDROCORTISONE SODIUM SUCCINATE 50 MG: 100 INJECTION, POWDER, FOR SOLUTION INTRAMUSCULAR; INTRAVENOUS at 04:59

## 2023-01-01 RX ADMIN — SENNOSIDES AND DOCUSATE SODIUM 2 TABLET: 50; 8.6 TABLET ORAL at 08:47

## 2023-01-01 RX ADMIN — DOCUSATE SODIUM 50 MG AND SENNOSIDES 8.6 MG 2 TABLET: 8.6; 5 TABLET, FILM COATED ORAL at 05:09

## 2023-01-01 RX ADMIN — HYDROMORPHONE HYDROCHLORIDE 0.5 MG: 1 INJECTION, SOLUTION INTRAMUSCULAR; INTRAVENOUS; SUBCUTANEOUS at 14:15

## 2023-01-01 RX ADMIN — SENNOSIDES AND DOCUSATE SODIUM 2 TABLET: 50; 8.6 TABLET ORAL at 19:57

## 2023-01-01 RX ADMIN — ACETAMINOPHEN 650 MG: 325 TABLET ORAL at 06:25

## 2023-01-01 RX ADMIN — Medication 950 MG: at 08:19

## 2023-01-01 RX ADMIN — DULOXETINE HYDROCHLORIDE 60 MG: 30 CAPSULE, DELAYED RELEASE ORAL at 05:38

## 2023-01-01 RX ADMIN — MORPHINE SULFATE 30 MG: 15 TABLET ORAL at 05:54

## 2023-01-01 RX ADMIN — MORPHINE SULFATE 15 MG: 15 TABLET, FILM COATED, EXTENDED RELEASE ORAL at 20:57

## 2023-01-01 RX ADMIN — SENNOSIDES AND DOCUSATE SODIUM 2 TABLET: 8.6; 5 TABLET ORAL at 08:06

## 2023-01-01 RX ADMIN — CEFAZOLIN 2 G: 2 INJECTION, POWDER, FOR SOLUTION INTRAMUSCULAR; INTRAVENOUS at 14:25

## 2023-01-01 RX ADMIN — ACETAMINOPHEN 1000 MG: 500 TABLET ORAL at 22:46

## 2023-01-01 RX ADMIN — MORPHINE SULFATE 15 MG: 15 TABLET, FILM COATED, EXTENDED RELEASE ORAL at 08:35

## 2023-01-01 RX ADMIN — CLOTRIMAZOLE: 10 CREAM TOPICAL at 05:12

## 2023-01-01 RX ADMIN — OMEPRAZOLE 40 MG: 20 CAPSULE, DELAYED RELEASE ORAL at 08:11

## 2023-01-01 RX ADMIN — MORPHINE SULFATE 30 MG: 30 TABLET, FILM COATED, EXTENDED RELEASE ORAL at 20:40

## 2023-01-01 RX ADMIN — MORPHINE SULFATE 15 MG: 15 TABLET, FILM COATED, EXTENDED RELEASE ORAL at 17:38

## 2023-01-01 RX ADMIN — CEFAZOLIN 2 G: 2 INJECTION, POWDER, FOR SOLUTION INTRAMUSCULAR; INTRAVENOUS at 22:08

## 2023-01-01 RX ADMIN — MORPHINE SULFATE 30 MG: 15 TABLET ORAL at 16:13

## 2023-01-01 RX ADMIN — OXYCODONE HYDROCHLORIDE 15 MG: 15 TABLET ORAL at 03:41

## 2023-01-01 RX ADMIN — Medication 1 APPLICATOR: at 16:41

## 2023-01-01 RX ADMIN — DOCUSATE SODIUM 50 MG AND SENNOSIDES 8.6 MG 2 TABLET: 8.6; 5 TABLET, FILM COATED ORAL at 04:47

## 2023-01-01 RX ADMIN — OXYCODONE HYDROCHLORIDE 15 MG: 15 TABLET ORAL at 03:22

## 2023-01-01 RX ADMIN — ACETAMINOPHEN 1000 MG: 500 TABLET ORAL at 16:07

## 2023-01-01 RX ADMIN — ENOXAPARIN SODIUM 40 MG: 100 INJECTION SUBCUTANEOUS at 17:51

## 2023-01-01 RX ADMIN — HYDROMORPHONE HYDROCHLORIDE 0.5 MG: 1 INJECTION, SOLUTION INTRAMUSCULAR; INTRAVENOUS; SUBCUTANEOUS at 20:51

## 2023-01-01 RX ADMIN — OMEPRAZOLE 20 MG: 20 CAPSULE, DELAYED RELEASE ORAL at 20:41

## 2023-01-01 RX ADMIN — HYDROMORPHONE HYDROCHLORIDE 1 MG: 1 INJECTION, SOLUTION INTRAMUSCULAR; INTRAVENOUS; SUBCUTANEOUS at 02:39

## 2023-01-01 RX ADMIN — PREGABALIN 150 MG: 150 CAPSULE ORAL at 12:00

## 2023-01-01 RX ADMIN — METHOCARBAMOL TABLETS 750 MG: 750 TABLET, COATED ORAL at 11:43

## 2023-01-01 RX ADMIN — DAPAGLIFLOZIN 10 MG: 10 TABLET, FILM COATED ORAL at 05:19

## 2023-01-01 RX ADMIN — CEFAZOLIN 2 G: 2 INJECTION, POWDER, FOR SOLUTION INTRAMUSCULAR; INTRAVENOUS at 13:33

## 2023-01-01 RX ADMIN — PREGABALIN 150 MG: 150 CAPSULE ORAL at 21:03

## 2023-01-01 RX ADMIN — LIDOCAINE HYDROCHLORIDE 100 MG: 20 INJECTION, SOLUTION EPIDURAL; INFILTRATION; INTRACAUDAL at 15:26

## 2023-01-01 RX ADMIN — SENNOSIDES AND DOCUSATE SODIUM 2 TABLET: 50; 8.6 TABLET ORAL at 09:00

## 2023-01-01 RX ADMIN — FUROSEMIDE 40 MG: 10 INJECTION, SOLUTION INTRAMUSCULAR; INTRAVENOUS at 21:19

## 2023-01-01 RX ADMIN — Medication 950 MG: at 08:12

## 2023-01-01 RX ADMIN — Medication 200 MCG: at 15:32

## 2023-01-01 RX ADMIN — SODIUM ZIRCONIUM CYCLOSILICATE 15 G: 5 POWDER, FOR SUSPENSION ORAL at 13:57

## 2023-01-01 RX ADMIN — AMIODARONE HYDROCHLORIDE 1 MG/MIN: 1.8 INJECTION, SOLUTION INTRAVENOUS at 04:21

## 2023-01-01 RX ADMIN — NICOTINE 7 MG: 7 PATCH, EXTENDED RELEASE TRANSDERMAL at 05:11

## 2023-01-01 RX ADMIN — CEFAZOLIN 2 G: 2 INJECTION, POWDER, FOR SOLUTION INTRAMUSCULAR; INTRAVENOUS at 22:53

## 2023-01-01 RX ADMIN — SENNOSIDES AND DOCUSATE SODIUM 2 TABLET: 50; 8.6 TABLET ORAL at 21:25

## 2023-01-01 RX ADMIN — PREGABALIN 150 MG: 150 CAPSULE ORAL at 11:19

## 2023-01-01 RX ADMIN — HYDROMORPHONE HYDROCHLORIDE 0.5 MG: 1 INJECTION, SOLUTION INTRAMUSCULAR; INTRAVENOUS; SUBCUTANEOUS at 06:55

## 2023-01-01 RX ADMIN — MORPHINE SULFATE 15 MG: 15 TABLET, FILM COATED, EXTENDED RELEASE ORAL at 06:00

## 2023-01-01 RX ADMIN — SENNOSIDES AND DOCUSATE SODIUM 2 TABLET: 50; 8.6 TABLET ORAL at 08:25

## 2023-01-01 RX ADMIN — OMEPRAZOLE 20 MG: 20 CAPSULE, DELAYED RELEASE ORAL at 20:43

## 2023-01-01 RX ADMIN — HYDROMORPHONE HYDROCHLORIDE 1 MG: 1 INJECTION, SOLUTION INTRAMUSCULAR; INTRAVENOUS; SUBCUTANEOUS at 00:00

## 2023-01-01 RX ADMIN — HYDROMORPHONE HYDROCHLORIDE 1 MG: 1 INJECTION, SOLUTION INTRAMUSCULAR; INTRAVENOUS; SUBCUTANEOUS at 16:28

## 2023-01-01 RX ADMIN — SODIUM CHLORIDE, PRESERVATIVE FREE 20 ML: 5 INJECTION INTRAVENOUS at 21:29

## 2023-01-01 RX ADMIN — Medication 3 MG: at 22:46

## 2023-01-01 RX ADMIN — OMEPRAZOLE 20 MG: 20 CAPSULE, DELAYED RELEASE ORAL at 09:44

## 2023-01-01 RX ADMIN — ZOLPIDEM TARTRATE 5 MG: 5 TABLET ORAL at 21:40

## 2023-01-01 RX ADMIN — SODIUM CHLORIDE: 9 INJECTION, SOLUTION INTRAVENOUS at 11:45

## 2023-01-01 RX ADMIN — MICONAZOLE NITRATE: 20 CREAM TOPICAL at 18:00

## 2023-01-01 RX ADMIN — MORPHINE SULFATE 15 MG: 15 TABLET, FILM COATED, EXTENDED RELEASE ORAL at 17:24

## 2023-01-01 RX ADMIN — LIDOCAINE 2 PATCH: 700 PATCH TOPICAL at 15:24

## 2023-01-01 RX ADMIN — MORPHINE SULFATE 15 MG: 15 TABLET, FILM COATED, EXTENDED RELEASE ORAL at 06:58

## 2023-01-01 RX ADMIN — DAPAGLIFLOZIN 10 MG: 10 TABLET, FILM COATED ORAL at 08:14

## 2023-01-01 RX ADMIN — ZOLPIDEM TARTRATE 5 MG: 5 TABLET ORAL at 22:45

## 2023-01-01 RX ADMIN — POTASSIUM CHLORIDE 10 MEQ: 7.46 INJECTION, SOLUTION INTRAVENOUS at 06:05

## 2023-01-01 RX ADMIN — LACTULOSE 30 ML: 20 SOLUTION ORAL at 08:37

## 2023-01-01 RX ADMIN — NICOTINE 7 MG: 7 PATCH, EXTENDED RELEASE TRANSDERMAL at 05:56

## 2023-01-01 RX ADMIN — DULOXETINE HYDROCHLORIDE 60 MG: 30 CAPSULE, DELAYED RELEASE ORAL at 08:39

## 2023-01-01 RX ADMIN — LACTULOSE 30 ML: 20 SOLUTION ORAL at 11:58

## 2023-01-01 RX ADMIN — SIMETHICONE 125 MG: 125 TABLET, CHEWABLE ORAL at 14:16

## 2023-01-01 RX ADMIN — MORPHINE SULFATE 15 MG: 15 TABLET ORAL at 02:31

## 2023-01-01 RX ADMIN — MORPHINE SULFATE 30 MG: 15 TABLET ORAL at 14:18

## 2023-01-01 RX ADMIN — OMEPRAZOLE 20 MG: 20 CAPSULE, DELAYED RELEASE ORAL at 05:14

## 2023-01-01 RX ADMIN — ACETAMINOPHEN 650 MG: 325 TABLET ORAL at 01:12

## 2023-01-01 RX ADMIN — OMEPRAZOLE 20 MG: 20 CAPSULE, DELAYED RELEASE ORAL at 08:40

## 2023-01-01 RX ADMIN — DULOXETINE HYDROCHLORIDE 60 MG: 30 CAPSULE, DELAYED RELEASE ORAL at 17:05

## 2023-01-01 RX ADMIN — SODIUM CHLORIDE, POTASSIUM CHLORIDE, SODIUM LACTATE AND CALCIUM CHLORIDE 500 ML: 600; 310; 30; 20 INJECTION, SOLUTION INTRAVENOUS at 18:31

## 2023-01-01 RX ADMIN — SENNOSIDES AND DOCUSATE SODIUM 2 TABLET: 8.6; 5 TABLET ORAL at 08:53

## 2023-01-01 RX ADMIN — NICOTINE 7 MG: 7 PATCH, EXTENDED RELEASE TRANSDERMAL at 05:55

## 2023-01-01 RX ADMIN — OMEPRAZOLE 20 MG: 20 CAPSULE, DELAYED RELEASE ORAL at 05:09

## 2023-01-01 RX ADMIN — THIAMINE HYDROCHLORIDE 100 MG: 100 INJECTION, SOLUTION INTRAMUSCULAR; INTRAVENOUS at 11:27

## 2023-01-01 RX ADMIN — LIDOCAINE 2 PATCH: 700 PATCH TOPICAL at 16:58

## 2023-01-01 RX ADMIN — ACETAMINOPHEN 650 MG: 325 TABLET ORAL at 21:18

## 2023-01-01 RX ADMIN — SODIUM CHLORIDE 500 ML: 9 INJECTION, SOLUTION INTRAVENOUS at 01:26

## 2023-01-01 RX ADMIN — PREDNISONE 10 MG: 5 TABLET ORAL at 21:39

## 2023-01-01 RX ADMIN — LIDOCAINE 2 PATCH: 700 PATCH TOPICAL at 15:29

## 2023-01-01 RX ADMIN — SIMETHICONE 125 MG: 125 TABLET, CHEWABLE ORAL at 14:05

## 2023-01-01 RX ADMIN — PREGABALIN 150 MG: 150 CAPSULE ORAL at 15:37

## 2023-01-01 RX ADMIN — SENNOSIDES AND DOCUSATE SODIUM 2 TABLET: 50; 8.6 TABLET ORAL at 08:18

## 2023-01-01 RX ADMIN — OMEPRAZOLE 40 MG: 20 CAPSULE, DELAYED RELEASE ORAL at 08:37

## 2023-01-01 RX ADMIN — THERA TABS 1 TABLET: TAB at 08:56

## 2023-01-01 RX ADMIN — DULOXETINE HYDROCHLORIDE 60 MG: 60 CAPSULE, DELAYED RELEASE ORAL at 18:23

## 2023-01-01 RX ADMIN — CEFDINIR 300 MG: 300 CAPSULE ORAL at 21:18

## 2023-01-01 RX ADMIN — FLUCONAZOLE 100 MG: 100 TABLET ORAL at 08:53

## 2023-01-01 RX ADMIN — ACETAMINOPHEN 650 MG: 325 TABLET ORAL at 17:08

## 2023-01-01 RX ADMIN — HYDROMORPHONE HYDROCHLORIDE 0.5 MG: 1 INJECTION, SOLUTION INTRAMUSCULAR; INTRAVENOUS; SUBCUTANEOUS at 01:53

## 2023-01-01 RX ADMIN — MIDODRINE HYDROCHLORIDE 10 MG: 5 TABLET ORAL at 08:40

## 2023-01-01 RX ADMIN — MORPHINE SULFATE 30 MG: 15 TABLET ORAL at 08:16

## 2023-01-01 RX ADMIN — SODIUM HYPOCHLORITE 10 ML: 1.25 SOLUTION TOPICAL at 05:22

## 2023-01-01 RX ADMIN — POTASSIUM CHLORIDE 10 MEQ: 1500 TABLET, EXTENDED RELEASE ORAL at 09:09

## 2023-01-01 RX ADMIN — HYDROCORTISONE SODIUM SUCCINATE 50 MG: 100 INJECTION, POWDER, FOR SOLUTION INTRAMUSCULAR; INTRAVENOUS at 00:17

## 2023-01-01 RX ADMIN — SENNOSIDES AND DOCUSATE SODIUM 2 TABLET: 50; 8.6 TABLET ORAL at 18:45

## 2023-01-01 RX ADMIN — LINEZOLID 600 MG: 600 TABLET, FILM COATED ORAL at 05:16

## 2023-01-01 RX ADMIN — SODIUM CHLORIDE 500 ML: 9 INJECTION, SOLUTION INTRAVENOUS at 19:34

## 2023-01-01 RX ADMIN — ACETAMINOPHEN 1000 MG: 500 TABLET ORAL at 15:47

## 2023-01-01 RX ADMIN — MORPHINE SULFATE 30 MG: 15 TABLET ORAL at 09:08

## 2023-01-01 RX ADMIN — PROPOFOL 50 MCG/KG/MIN: 10 INJECTION, EMULSION INTRAVENOUS at 15:11

## 2023-01-01 RX ADMIN — OMEPRAZOLE 20 MG: 20 CAPSULE, DELAYED RELEASE ORAL at 20:33

## 2023-01-01 RX ADMIN — ACETAMINOPHEN 650 MG: 325 TABLET ORAL at 16:53

## 2023-01-01 RX ADMIN — GABAPENTIN 300 MG: 300 CAPSULE ORAL at 18:06

## 2023-01-01 RX ADMIN — DULOXETINE HYDROCHLORIDE 60 MG: 30 CAPSULE, DELAYED RELEASE ORAL at 08:56

## 2023-01-01 RX ADMIN — DRONABINOL 2.5 MG: 2.5 CAPSULE ORAL at 11:06

## 2023-01-01 RX ADMIN — OXYCODONE HYDROCHLORIDE 5 MG: 5 TABLET ORAL at 15:35

## 2023-01-01 RX ADMIN — CEFDINIR 300 MG: 300 CAPSULE ORAL at 07:50

## 2023-01-01 RX ADMIN — THIAMINE HCL TAB 100 MG 100 MG: 100 TAB at 05:22

## 2023-01-01 RX ADMIN — HYDROCORTISONE: 1 CREAM TOPICAL at 21:25

## 2023-01-01 RX ADMIN — PREGABALIN 150 MG: 150 CAPSULE ORAL at 14:41

## 2023-01-01 RX ADMIN — SODIUM CHLORIDE 2 G: 1 TABLET ORAL at 08:36

## 2023-01-01 RX ADMIN — ZOLPIDEM TARTRATE 5 MG: 5 TABLET ORAL at 22:31

## 2023-01-01 RX ADMIN — HYDROCORTISONE SODIUM SUCCINATE 50 MG: 100 INJECTION, POWDER, FOR SOLUTION INTRAMUSCULAR; INTRAVENOUS at 23:55

## 2023-01-01 RX ADMIN — OMEPRAZOLE 20 MG: 20 CAPSULE, DELAYED RELEASE ORAL at 05:32

## 2023-01-01 RX ADMIN — APIXABAN 5 MG: 5 TABLET, FILM COATED ORAL at 08:36

## 2023-01-01 RX ADMIN — MORPHINE SULFATE 30 MG: 15 TABLET ORAL at 15:19

## 2023-01-01 RX ADMIN — THERA TABS 1 TABLET: TAB at 12:54

## 2023-01-01 RX ADMIN — HYDROMORPHONE HYDROCHLORIDE 1 MG: 1 INJECTION, SOLUTION INTRAMUSCULAR; INTRAVENOUS; SUBCUTANEOUS at 03:03

## 2023-01-01 RX ADMIN — MORPHINE SULFATE 30 MG: 15 TABLET ORAL at 10:29

## 2023-01-01 RX ADMIN — DAPAGLIFLOZIN 10 MG: 10 TABLET, FILM COATED ORAL at 08:23

## 2023-01-01 RX ADMIN — Medication 950 MG: at 05:09

## 2023-01-01 RX ADMIN — CEFAZOLIN 2 G: 2 INJECTION, POWDER, FOR SOLUTION INTRAMUSCULAR; INTRAVENOUS at 22:15

## 2023-01-01 RX ADMIN — ACETAMINOPHEN 650 MG: 325 TABLET ORAL at 17:36

## 2023-01-01 RX ADMIN — OMEPRAZOLE 20 MG: 20 CAPSULE, DELAYED RELEASE ORAL at 20:45

## 2023-01-01 RX ADMIN — METHYLPREDNISOLONE 24 MG: 4 TABLET ORAL at 14:17

## 2023-01-01 RX ADMIN — HEPARIN SODIUM 5000 UNITS: 5000 INJECTION, SOLUTION INTRAVENOUS; SUBCUTANEOUS at 14:24

## 2023-01-01 RX ADMIN — HYDROMORPHONE HYDROCHLORIDE 1 MG: 1 INJECTION, SOLUTION INTRAMUSCULAR; INTRAVENOUS; SUBCUTANEOUS at 22:21

## 2023-01-01 RX ADMIN — DIBASIC SODIUM PHOSPHATE, MONOBASIC POTASSIUM PHOSPHATE AND MONOBASIC SODIUM PHOSPHATE 500 MG: 852; 155; 130 TABLET ORAL at 05:19

## 2023-01-01 RX ADMIN — MORPHINE SULFATE 15 MG: 15 TABLET, FILM COATED, EXTENDED RELEASE ORAL at 21:39

## 2023-01-01 RX ADMIN — PREGABALIN 150 MG: 150 CAPSULE ORAL at 21:46

## 2023-01-01 RX ADMIN — MAGNESIUM HYDROXIDE 30 ML: 1200 LIQUID ORAL at 17:38

## 2023-01-01 RX ADMIN — ACETAMINOPHEN 650 MG: 325 TABLET ORAL at 11:34

## 2023-01-01 RX ADMIN — APIXABAN 5 MG: 5 TABLET, FILM COATED ORAL at 21:57

## 2023-01-01 RX ADMIN — Medication 2000 UNITS: at 10:29

## 2023-01-01 RX ADMIN — METHOCARBAMOL TABLETS 750 MG: 750 TABLET, COATED ORAL at 10:04

## 2023-01-01 RX ADMIN — OXYCODONE HYDROCHLORIDE 10 MG: 5 SOLUTION ORAL at 09:35

## 2023-01-01 RX ADMIN — MORPHINE SULFATE 30 MG: 15 TABLET ORAL at 17:06

## 2023-01-01 RX ADMIN — MORPHINE SULFATE 30 MG: 30 TABLET, FILM COATED, EXTENDED RELEASE ORAL at 17:35

## 2023-01-01 RX ADMIN — HYDROCORTISONE SODIUM SUCCINATE 50 MG: 100 INJECTION, POWDER, FOR SOLUTION INTRAMUSCULAR; INTRAVENOUS at 05:23

## 2023-01-01 RX ADMIN — MORPHINE SULFATE 30 MG: 30 TABLET, FILM COATED, EXTENDED RELEASE ORAL at 08:32

## 2023-01-01 RX ADMIN — SENNOSIDES AND DOCUSATE SODIUM 2 TABLET: 50; 8.6 TABLET ORAL at 21:39

## 2023-01-01 RX ADMIN — ONDANSETRON 4 MG: 2 INJECTION INTRAMUSCULAR; INTRAVENOUS at 09:07

## 2023-01-01 RX ADMIN — HYDROMORPHONE HYDROCHLORIDE 1 MG: 1 INJECTION, SOLUTION INTRAMUSCULAR; INTRAVENOUS; SUBCUTANEOUS at 13:01

## 2023-01-01 RX ADMIN — DULOXETINE HYDROCHLORIDE 60 MG: 30 CAPSULE, DELAYED RELEASE ORAL at 08:45

## 2023-01-01 RX ADMIN — MAGNESIUM HYDROXIDE 30 ML: 1200 LIQUID ORAL at 05:54

## 2023-01-01 RX ADMIN — ACETAMINOPHEN 650 MG: 325 TABLET, FILM COATED ORAL at 05:23

## 2023-01-01 RX ADMIN — MORPHINE SULFATE 30 MG: 30 TABLET, FILM COATED, EXTENDED RELEASE ORAL at 21:26

## 2023-01-01 RX ADMIN — MORPHINE SULFATE 30 MG: 15 TABLET ORAL at 08:37

## 2023-01-01 RX ADMIN — METHOCARBAMOL 750 MG: 750 TABLET ORAL at 13:01

## 2023-01-01 RX ADMIN — DAPAGLIFLOZIN 10 MG: 10 TABLET, FILM COATED ORAL at 08:15

## 2023-01-01 RX ADMIN — CEFAZOLIN 2 G: 2 INJECTION, POWDER, FOR SOLUTION INTRAMUSCULAR; INTRAVENOUS at 06:05

## 2023-01-01 RX ADMIN — MORPHINE SULFATE 30 MG: 15 TABLET ORAL at 21:25

## 2023-01-01 RX ADMIN — DOCUSATE SODIUM 10 MG: 50 LIQUID ORAL at 09:27

## 2023-01-01 RX ADMIN — ACETAMINOPHEN 650 MG: 325 TABLET ORAL at 05:12

## 2023-01-01 RX ADMIN — PREGABALIN 150 MG: 150 CAPSULE ORAL at 08:48

## 2023-01-01 RX ADMIN — METHYLPREDNISOLONE 8 MG: 4 TABLET ORAL at 21:54

## 2023-01-01 RX ADMIN — METHYLPREDNISOLONE 4 MG: 4 TABLET ORAL at 18:01

## 2023-01-01 RX ADMIN — Medication 1 APPLICATOR: at 05:04

## 2023-01-01 RX ADMIN — ACETAMINOPHEN 650 MG: 325 TABLET ORAL at 11:23

## 2023-01-01 RX ADMIN — SALINE NASAL SPRAY 2 SPRAY: 1.5 SOLUTION NASAL at 08:41

## 2023-01-01 RX ADMIN — MORPHINE SULFATE 15 MG: 15 TABLET, FILM COATED, EXTENDED RELEASE ORAL at 17:41

## 2023-01-01 RX ADMIN — HYDROMORPHONE HYDROCHLORIDE 1 MG: 1 INJECTION, SOLUTION INTRAMUSCULAR; INTRAVENOUS; SUBCUTANEOUS at 17:16

## 2023-01-01 RX ADMIN — LIDOCAINE 2 PATCH: 700 PATCH TOPICAL at 16:53

## 2023-01-01 RX ADMIN — ZOLPIDEM TARTRATE 10 MG: 5 TABLET ORAL at 00:17

## 2023-01-01 RX ADMIN — ENOXAPARIN SODIUM 40 MG: 100 INJECTION SUBCUTANEOUS at 18:35

## 2023-01-01 RX ADMIN — OXYCODONE HYDROCHLORIDE 15 MG: 15 TABLET ORAL at 11:38

## 2023-01-01 RX ADMIN — Medication 3 MG: at 20:45

## 2023-01-01 RX ADMIN — MORPHINE SULFATE 30 MG: 15 TABLET ORAL at 22:12

## 2023-01-01 RX ADMIN — NICOTINE 7 MG: 7 PATCH TRANSDERMAL at 05:25

## 2023-01-01 RX ADMIN — SENNOSIDES AND DOCUSATE SODIUM 2 TABLET: 50; 8.6 TABLET ORAL at 05:18

## 2023-01-01 RX ADMIN — HYDROMORPHONE HYDROCHLORIDE 0.5 MG: 1 INJECTION, SOLUTION INTRAMUSCULAR; INTRAVENOUS; SUBCUTANEOUS at 22:54

## 2023-01-01 RX ADMIN — NICOTINE 7 MG: 7 PATCH, EXTENDED RELEASE TRANSDERMAL at 10:05

## 2023-01-01 RX ADMIN — MORPHINE SULFATE 15 MG: 15 TABLET ORAL at 14:44

## 2023-01-01 RX ADMIN — OXYCODONE HYDROCHLORIDE 10 MG: 10 TABLET ORAL at 09:05

## 2023-01-01 RX ADMIN — FUROSEMIDE 60 MG: 10 INJECTION, SOLUTION INTRAMUSCULAR; INTRAVENOUS at 15:29

## 2023-01-01 RX ADMIN — PREGABALIN 150 MG: 150 CAPSULE ORAL at 04:54

## 2023-01-01 RX ADMIN — SODIUM ZIRCONIUM CYCLOSILICATE 15 G: 5 POWDER, FOR SUSPENSION ORAL at 08:37

## 2023-01-01 RX ADMIN — OXYCODONE HYDROCHLORIDE 10 MG: 10 TABLET ORAL at 05:20

## 2023-01-01 RX ADMIN — OMEPRAZOLE 40 MG: 20 CAPSULE, DELAYED RELEASE ORAL at 08:27

## 2023-01-01 RX ADMIN — FENTANYL CITRATE 50 MCG: 50 INJECTION, SOLUTION INTRAMUSCULAR; INTRAVENOUS at 17:26

## 2023-01-01 RX ADMIN — METHYLPREDNISOLONE 4 MG: 4 TABLET ORAL at 21:16

## 2023-01-01 RX ADMIN — SODIUM CHLORIDE, PRESERVATIVE FREE 20 ML: 5 INJECTION INTRAVENOUS at 20:45

## 2023-01-01 RX ADMIN — MORPHINE SULFATE 30 MG: 15 TABLET ORAL at 20:45

## 2023-01-01 RX ADMIN — ONDANSETRON 4 MG: 2 INJECTION INTRAMUSCULAR; INTRAVENOUS at 16:33

## 2023-01-01 RX ADMIN — LINEZOLID 600 MG: 600 TABLET, FILM COATED ORAL at 18:22

## 2023-01-01 RX ADMIN — ACETAMINOPHEN 650 MG: 325 TABLET ORAL at 17:14

## 2023-01-01 RX ADMIN — MORPHINE SULFATE 30 MG: 15 TABLET ORAL at 23:50

## 2023-01-01 RX ADMIN — HYDROMORPHONE HYDROCHLORIDE 3 MG: 2 INJECTION, SOLUTION INTRAMUSCULAR; INTRAVENOUS; SUBCUTANEOUS at 21:01

## 2023-01-01 RX ADMIN — MORPHINE SULFATE 30 MG: 15 TABLET ORAL at 08:22

## 2023-01-01 RX ADMIN — MORPHINE SULFATE 30 MG: 15 TABLET ORAL at 08:35

## 2023-01-01 RX ADMIN — OXYCODONE HYDROCHLORIDE 10 MG: 10 TABLET ORAL at 17:05

## 2023-01-01 RX ADMIN — MORPHINE SULFATE 30 MG: 15 TABLET ORAL at 20:46

## 2023-01-01 RX ADMIN — MAGNESIUM HYDROXIDE 30 ML: 1200 LIQUID ORAL at 18:21

## 2023-01-01 RX ADMIN — MORPHINE SULFATE 30 MG: 15 TABLET ORAL at 08:21

## 2023-01-01 RX ADMIN — DULOXETINE HYDROCHLORIDE 60 MG: 30 CAPSULE, DELAYED RELEASE ORAL at 08:53

## 2023-01-01 RX ADMIN — SODIUM HYPOCHLORITE 50 ML: 1.25 SOLUTION TOPICAL at 17:11

## 2023-01-01 RX ADMIN — MICONAZOLE NITRATE: 20 CREAM TOPICAL at 20:46

## 2023-01-01 RX ADMIN — HEPARIN SODIUM 5000 UNITS: 5000 INJECTION, SOLUTION INTRAVENOUS; SUBCUTANEOUS at 21:12

## 2023-01-01 RX ADMIN — OMEPRAZOLE 40 MG: 20 CAPSULE, DELAYED RELEASE ORAL at 08:22

## 2023-01-01 RX ADMIN — HYDROMORPHONE HYDROCHLORIDE 0.5 MG: 1 INJECTION, SOLUTION INTRAMUSCULAR; INTRAVENOUS; SUBCUTANEOUS at 13:11

## 2023-01-01 RX ADMIN — NICOTINE 7 MG: 7 PATCH, EXTENDED RELEASE TRANSDERMAL at 05:18

## 2023-01-01 RX ADMIN — ACETAMINOPHEN 650 MG: 325 TABLET ORAL at 17:46

## 2023-01-01 RX ADMIN — MICONAZOLE NITRATE: 20 CREAM TOPICAL at 13:53

## 2023-01-01 RX ADMIN — OXYCODONE HYDROCHLORIDE 10 MG: 10 TABLET ORAL at 14:23

## 2023-01-01 RX ADMIN — HYDROMORPHONE HYDROCHLORIDE 0.5 MG: 1 INJECTION, SOLUTION INTRAMUSCULAR; INTRAVENOUS; SUBCUTANEOUS at 06:43

## 2023-01-01 RX ADMIN — POTASSIUM CHLORIDE 40 MEQ: 1500 TABLET, EXTENDED RELEASE ORAL at 05:09

## 2023-01-01 RX ADMIN — MORPHINE SULFATE 15 MG: 15 TABLET, FILM COATED, EXTENDED RELEASE ORAL at 05:26

## 2023-01-01 RX ADMIN — MAGNESIUM HYDROXIDE 30 ML: 400 SUSPENSION ORAL at 08:41

## 2023-01-01 RX ADMIN — OXYCODONE HYDROCHLORIDE 15 MG: 15 TABLET ORAL at 05:32

## 2023-01-01 RX ADMIN — POTASSIUM CHLORIDE 10 MEQ: 7.46 INJECTION, SOLUTION INTRAVENOUS at 21:04

## 2023-01-01 RX ADMIN — MORPHINE SULFATE 30 MG: 15 TABLET ORAL at 05:47

## 2023-01-01 RX ADMIN — SENNOSIDES AND DOCUSATE SODIUM 2 TABLET: 50; 8.6 TABLET ORAL at 21:12

## 2023-01-01 RX ADMIN — CEFEPIME 2 G: 2 INJECTION, POWDER, FOR SOLUTION INTRAVENOUS at 14:59

## 2023-01-01 RX ADMIN — MICONAZOLE NITRATE: 20 CREAM TOPICAL at 09:02

## 2023-01-01 RX ADMIN — MORPHINE SULFATE 30 MG: 15 TABLET ORAL at 00:27

## 2023-01-01 RX ADMIN — ACETAMINOPHEN 1000 MG: 500 TABLET, FILM COATED ORAL at 11:44

## 2023-01-01 RX ADMIN — MORPHINE SULFATE 30 MG: 30 TABLET, FILM COATED, EXTENDED RELEASE ORAL at 06:42

## 2023-01-01 RX ADMIN — ACETAMINOPHEN 650 MG: 325 TABLET ORAL at 12:26

## 2023-01-01 RX ADMIN — METHOCARBAMOL 750 MG: 750 TABLET ORAL at 02:49

## 2023-01-01 RX ADMIN — ENOXAPARIN SODIUM 40 MG: 100 INJECTION SUBCUTANEOUS at 17:38

## 2023-01-01 RX ADMIN — GABAPENTIN 600 MG: 300 CAPSULE ORAL at 17:45

## 2023-01-01 RX ADMIN — MORPHINE SULFATE 15 MG: 15 TABLET, FILM COATED, EXTENDED RELEASE ORAL at 20:42

## 2023-01-01 RX ADMIN — OMEPRAZOLE 20 MG: 20 CAPSULE, DELAYED RELEASE ORAL at 18:23

## 2023-01-01 RX ADMIN — THERA TABS 1 TABLET: TAB at 08:11

## 2023-01-01 RX ADMIN — OXYCODONE HYDROCHLORIDE 10 MG: 10 TABLET ORAL at 21:43

## 2023-01-01 RX ADMIN — Medication 1 APPLICATOR: at 18:37

## 2023-01-01 RX ADMIN — MICONAZOLE NITRATE: 20 CREAM TOPICAL at 05:35

## 2023-01-01 RX ADMIN — ACETAMINOPHEN 650 MG: 325 TABLET, FILM COATED ORAL at 17:45

## 2023-01-01 RX ADMIN — SODIUM HYPOCHLORITE 20 ML: 1.25 SOLUTION TOPICAL at 18:00

## 2023-01-01 RX ADMIN — OXYCODONE HYDROCHLORIDE 10 MG: 10 TABLET ORAL at 17:32

## 2023-01-01 RX ADMIN — MORPHINE SULFATE 30 MG: 15 TABLET ORAL at 20:15

## 2023-01-01 RX ADMIN — MORPHINE SULFATE 30 MG: 15 TABLET ORAL at 21:24

## 2023-01-01 RX ADMIN — ACETAMINOPHEN 650 MG: 325 TABLET, FILM COATED ORAL at 00:23

## 2023-01-01 RX ADMIN — PREGABALIN 150 MG: 150 CAPSULE ORAL at 17:29

## 2023-01-01 RX ADMIN — BISACODYL 10 MG: 10 SUPPOSITORY RECTAL at 16:13

## 2023-01-01 RX ADMIN — DOCUSATE SODIUM 50 MG AND SENNOSIDES 8.6 MG 2 TABLET: 8.6; 5 TABLET, FILM COATED ORAL at 17:41

## 2023-01-01 RX ADMIN — OMEPRAZOLE 20 MG: 20 CAPSULE, DELAYED RELEASE ORAL at 05:52

## 2023-01-01 RX ADMIN — PREGABALIN 150 MG: 150 CAPSULE ORAL at 15:23

## 2023-01-01 RX ADMIN — DEXAMETHASONE SODIUM PHOSPHATE 1 MG: 10 INJECTION INTRAMUSCULAR; INTRAVENOUS at 17:41

## 2023-01-01 RX ADMIN — DOBUTAMINE HYDROCHLORIDE 2.5 MCG/KG/MIN: 100 INJECTION INTRAVENOUS at 04:16

## 2023-01-01 RX ADMIN — MORPHINE SULFATE 15 MG: 15 TABLET, FILM COATED, EXTENDED RELEASE ORAL at 08:56

## 2023-01-01 RX ADMIN — THIAMINE HCL TAB 100 MG 100 MG: 100 TAB at 05:09

## 2023-01-01 RX ADMIN — PREGABALIN 150 MG: 150 CAPSULE ORAL at 14:23

## 2023-01-01 RX ADMIN — OXYCODONE HYDROCHLORIDE 10 MG: 10 TABLET ORAL at 00:08

## 2023-01-01 RX ADMIN — MORPHINE SULFATE 30 MG: 30 TABLET, FILM COATED, EXTENDED RELEASE ORAL at 04:47

## 2023-01-01 RX ADMIN — SENNOSIDES AND DOCUSATE SODIUM 2 TABLET: 50; 8.6 TABLET ORAL at 21:22

## 2023-01-01 RX ADMIN — OMEPRAZOLE 20 MG: 20 CAPSULE, DELAYED RELEASE ORAL at 08:41

## 2023-01-01 RX ADMIN — MORPHINE SULFATE 30 MG: 15 TABLET ORAL at 17:04

## 2023-01-01 RX ADMIN — PREGABALIN 150 MG: 150 CAPSULE ORAL at 14:49

## 2023-01-01 RX ADMIN — MORPHINE SULFATE 30 MG: 15 TABLET ORAL at 21:58

## 2023-01-01 RX ADMIN — CEFEPIME 2 G: 2 INJECTION, POWDER, FOR SOLUTION INTRAVENOUS at 21:08

## 2023-01-01 RX ADMIN — OXYCODONE HYDROCHLORIDE 15 MG: 15 TABLET ORAL at 13:00

## 2023-01-01 RX ADMIN — PREGABALIN 150 MG: 150 CAPSULE ORAL at 20:57

## 2023-01-01 RX ADMIN — LINEZOLID 600 MG: 600 INJECTION, SOLUTION INTRAVENOUS at 05:28

## 2023-01-01 RX ADMIN — DULOXETINE HYDROCHLORIDE 60 MG: 30 CAPSULE, DELAYED RELEASE ORAL at 08:06

## 2023-01-01 RX ADMIN — OMEPRAZOLE 20 MG: 20 CAPSULE, DELAYED RELEASE ORAL at 21:55

## 2023-01-01 RX ADMIN — HYDROMORPHONE HYDROCHLORIDE 0.5 MG: 1 INJECTION, SOLUTION INTRAMUSCULAR; INTRAVENOUS; SUBCUTANEOUS at 16:36

## 2023-01-01 RX ADMIN — CEFEPIME 2 G: 2 INJECTION, POWDER, FOR SOLUTION INTRAVENOUS at 22:13

## 2023-01-01 RX ADMIN — PREGABALIN 200 MG: 100 CAPSULE ORAL at 21:29

## 2023-01-01 RX ADMIN — PREGABALIN 100 MG: 100 CAPSULE ORAL at 21:50

## 2023-01-01 RX ADMIN — OMEPRAZOLE 20 MG: 20 CAPSULE, DELAYED RELEASE ORAL at 04:45

## 2023-01-01 RX ADMIN — PREGABALIN 100 MG: 100 CAPSULE ORAL at 15:22

## 2023-01-01 RX ADMIN — MICONAZOLE NITRATE: 20 CREAM TOPICAL at 18:15

## 2023-01-01 RX ADMIN — OMEPRAZOLE 20 MG: 20 CAPSULE, DELAYED RELEASE ORAL at 05:47

## 2023-01-01 RX ADMIN — SENNOSIDES AND DOCUSATE SODIUM 2 TABLET: 50; 8.6 TABLET ORAL at 08:57

## 2023-01-01 RX ADMIN — ACETAMINOPHEN 650 MG: 325 TABLET ORAL at 20:04

## 2023-01-01 RX ADMIN — THERA TABS 1 TABLET: TAB at 08:18

## 2023-01-01 RX ADMIN — ACETAMINOPHEN 650 MG: 325 TABLET ORAL at 17:55

## 2023-01-01 RX ADMIN — FUROSEMIDE 40 MG: 10 INJECTION, SOLUTION INTRAVENOUS at 05:59

## 2023-01-01 RX ADMIN — SODIUM CHLORIDE, PRESERVATIVE FREE 20 ML: 5 INJECTION INTRAVENOUS at 22:04

## 2023-01-01 RX ADMIN — PIPERACILLIN AND TAZOBACTAM 4.5 G: 4; .5 INJECTION, POWDER, LYOPHILIZED, FOR SOLUTION INTRAVENOUS; PARENTERAL at 21:32

## 2023-01-01 RX ADMIN — MORPHINE SULFATE 30 MG: 15 TABLET ORAL at 08:53

## 2023-01-01 RX ADMIN — ACETAMINOPHEN 650 MG: 325 TABLET, FILM COATED ORAL at 23:56

## 2023-01-01 RX ADMIN — CEFEPIME 2 G: 2 INJECTION, POWDER, FOR SOLUTION INTRAVENOUS at 05:22

## 2023-01-01 RX ADMIN — HYDROMORPHONE HYDROCHLORIDE 0.5 MG: 1 INJECTION, SOLUTION INTRAMUSCULAR; INTRAVENOUS; SUBCUTANEOUS at 23:32

## 2023-01-01 RX ADMIN — AMOXICILLIN AND CLAVULANATE POTASSIUM 1 TABLET: 875; 125 TABLET, FILM COATED ORAL at 21:39

## 2023-01-01 RX ADMIN — DULOXETINE HYDROCHLORIDE 60 MG: 30 CAPSULE, DELAYED RELEASE ORAL at 08:41

## 2023-01-01 RX ADMIN — MINERAL OIL, PETROLATUM 1 APPLICATION: 425; 573 OINTMENT OPHTHALMIC at 23:05

## 2023-01-01 RX ADMIN — BISACODYL 10 MG: 10 SUPPOSITORY RECTAL at 11:41

## 2023-01-01 RX ADMIN — DOCUSATE SODIUM 50 MG AND SENNOSIDES 8.6 MG 2 TABLET: 8.6; 5 TABLET, FILM COATED ORAL at 17:04

## 2023-01-01 RX ADMIN — SIMETHICONE 125 MG: 125 TABLET, CHEWABLE ORAL at 08:27

## 2023-01-01 RX ADMIN — Medication 1000 UNITS: at 08:23

## 2023-01-01 RX ADMIN — ACETAMINOPHEN 650 MG: 325 TABLET ORAL at 05:38

## 2023-01-01 RX ADMIN — ACETAMINOPHEN 650 MG: 325 TABLET ORAL at 17:53

## 2023-01-01 RX ADMIN — POTASSIUM CHLORIDE 20 MEQ: 1500 TABLET, EXTENDED RELEASE ORAL at 05:08

## 2023-01-01 RX ADMIN — MORPHINE SULFATE 30 MG: 30 TABLET, FILM COATED, EXTENDED RELEASE ORAL at 08:26

## 2023-01-01 RX ADMIN — ACETAMINOPHEN 650 MG: 325 TABLET ORAL at 05:47

## 2023-01-01 RX ADMIN — NICOTINE 7 MG: 7 PATCH TRANSDERMAL at 04:47

## 2023-01-01 RX ADMIN — ACETAMINOPHEN 650 MG: 325 TABLET ORAL at 06:03

## 2023-01-01 RX ADMIN — HYDROMORPHONE HYDROCHLORIDE 0.5 MG: 1 INJECTION, SOLUTION INTRAMUSCULAR; INTRAVENOUS; SUBCUTANEOUS at 09:16

## 2023-01-01 RX ADMIN — NITROGLYCERIN 10 ML: 20 INJECTION INTRAVENOUS at 16:48

## 2023-01-01 RX ADMIN — POTASSIUM CHLORIDE 10 MEQ: 1500 TABLET, EXTENDED RELEASE ORAL at 08:24

## 2023-01-01 RX ADMIN — OXYCODONE HYDROCHLORIDE 15 MG: 15 TABLET ORAL at 23:56

## 2023-01-01 RX ADMIN — FENTANYL CITRATE 50 MCG: 50 INJECTION, SOLUTION INTRAMUSCULAR; INTRAVENOUS at 17:16

## 2023-01-01 RX ADMIN — SIMETHICONE 125 MG: 125 TABLET, CHEWABLE ORAL at 08:33

## 2023-01-01 RX ADMIN — PREGABALIN 100 MG: 100 CAPSULE ORAL at 16:04

## 2023-01-01 RX ADMIN — ACETAMINOPHEN 650 MG: 325 TABLET, FILM COATED ORAL at 23:41

## 2023-01-01 RX ADMIN — HYDROMORPHONE HYDROCHLORIDE 0.5 MG: 1 INJECTION, SOLUTION INTRAMUSCULAR; INTRAVENOUS; SUBCUTANEOUS at 08:34

## 2023-01-01 RX ADMIN — SPIRONOLACTONE 25 MG: 25 TABLET ORAL at 05:16

## 2023-01-01 RX ADMIN — MORPHINE SULFATE 30 MG: 15 TABLET ORAL at 14:05

## 2023-01-01 RX ADMIN — DOXYCYCLINE 100 MG: 100 INJECTION, POWDER, LYOPHILIZED, FOR SOLUTION INTRAVENOUS at 01:13

## 2023-01-01 RX ADMIN — THIAMINE HCL TAB 100 MG 100 MG: 100 TAB at 08:29

## 2023-01-01 RX ADMIN — MORPHINE SULFATE 15 MG: 15 TABLET, FILM COATED, EXTENDED RELEASE ORAL at 08:46

## 2023-01-01 RX ADMIN — DAPAGLIFLOZIN 10 MG: 10 TABLET, FILM COATED ORAL at 05:23

## 2023-01-01 RX ADMIN — CEFEPIME 2 G: 2 INJECTION, POWDER, FOR SOLUTION INTRAVENOUS at 21:33

## 2023-01-01 RX ADMIN — SODIUM BICARBONATE 75 MEQ: 84 INJECTION, SOLUTION INTRAVENOUS at 18:19

## 2023-01-01 RX ADMIN — PREGABALIN 150 MG: 150 CAPSULE ORAL at 20:35

## 2023-01-01 RX ADMIN — DOCUSATE SODIUM 50 MG AND SENNOSIDES 8.6 MG 2 TABLET: 8.6; 5 TABLET, FILM COATED ORAL at 18:49

## 2023-01-01 RX ADMIN — MORPHINE SULFATE 30 MG: 15 TABLET ORAL at 17:35

## 2023-01-01 RX ADMIN — MORPHINE SULFATE 30 MG: 30 TABLET, FILM COATED, EXTENDED RELEASE ORAL at 20:14

## 2023-01-01 RX ADMIN — DAPAGLIFLOZIN 10 MG: 10 TABLET, FILM COATED ORAL at 08:50

## 2023-01-01 RX ADMIN — CEFAZOLIN 2 G: 2 INJECTION, POWDER, FOR SOLUTION INTRAMUSCULAR; INTRAVENOUS at 15:40

## 2023-01-01 RX ADMIN — SIMETHICONE 125 MG: 125 TABLET, CHEWABLE ORAL at 20:39

## 2023-01-01 RX ADMIN — ACETAMINOPHEN 650 MG: 325 TABLET ORAL at 11:39

## 2023-01-01 RX ADMIN — MICONAZOLE NITRATE: 20 CREAM TOPICAL at 08:30

## 2023-01-01 RX ADMIN — MORPHINE SULFATE 30 MG: 15 TABLET ORAL at 08:24

## 2023-01-01 RX ADMIN — THERA TABS 1 TABLET: TAB at 08:45

## 2023-01-01 RX ADMIN — OMEPRAZOLE 40 MG: 20 CAPSULE, DELAYED RELEASE ORAL at 05:15

## 2023-01-01 RX ADMIN — METHOCARBAMOL 750 MG: 750 TABLET ORAL at 17:38

## 2023-01-01 RX ADMIN — CEFAZOLIN 2 G: 2 INJECTION, POWDER, FOR SOLUTION INTRAMUSCULAR; INTRAVENOUS at 05:15

## 2023-01-01 RX ADMIN — MORPHINE SULFATE 30 MG: 15 TABLET ORAL at 18:23

## 2023-01-01 RX ADMIN — ACETAMINOPHEN 650 MG: 325 TABLET ORAL at 17:18

## 2023-01-01 RX ADMIN — KETOROLAC TROMETHAMINE 30 MG: 30 INJECTION, SOLUTION INTRAMUSCULAR at 16:40

## 2023-01-01 RX ADMIN — MORPHINE SULFATE 30 MG: 15 TABLET ORAL at 23:43

## 2023-01-01 RX ADMIN — FUROSEMIDE 80 MG: 10 INJECTION, SOLUTION INTRAMUSCULAR; INTRAVENOUS at 15:56

## 2023-01-01 RX ADMIN — DULOXETINE HYDROCHLORIDE 60 MG: 60 CAPSULE, DELAYED RELEASE ORAL at 05:04

## 2023-01-01 RX ADMIN — MORPHINE SULFATE 15 MG: 15 TABLET, FILM COATED, EXTENDED RELEASE ORAL at 08:40

## 2023-01-01 RX ADMIN — MORPHINE SULFATE 30 MG: 15 TABLET ORAL at 11:58

## 2023-01-01 RX ADMIN — OMEPRAZOLE 20 MG: 20 CAPSULE, DELAYED RELEASE ORAL at 08:24

## 2023-01-01 RX ADMIN — BISACODYL 10 MG: 10 SUPPOSITORY RECTAL at 21:46

## 2023-01-01 RX ADMIN — HEPARIN SODIUM 5000 UNITS: 5000 INJECTION, SOLUTION INTRAVENOUS; SUBCUTANEOUS at 13:53

## 2023-01-01 RX ADMIN — METHOCARBAMOL TABLETS 750 MG: 750 TABLET, COATED ORAL at 16:14

## 2023-01-01 RX ADMIN — THIAMINE HCL TAB 100 MG 100 MG: 100 TAB at 06:16

## 2023-01-01 RX ADMIN — PREGABALIN 150 MG: 150 CAPSULE ORAL at 18:23

## 2023-01-01 RX ADMIN — PREDNISONE 10 MG: 5 TABLET ORAL at 09:08

## 2023-01-01 RX ADMIN — HEPARIN SODIUM 5000 UNITS: 5000 INJECTION, SOLUTION INTRAVENOUS; SUBCUTANEOUS at 13:58

## 2023-01-01 RX ADMIN — MORPHINE SULFATE 15 MG: 15 TABLET, FILM COATED, EXTENDED RELEASE ORAL at 05:54

## 2023-01-01 RX ADMIN — THIAMINE HCL TAB 100 MG 100 MG: 100 TAB at 08:11

## 2023-01-01 RX ADMIN — HYDROMORPHONE HYDROCHLORIDE 0.5 MG: 1 INJECTION, SOLUTION INTRAMUSCULAR; INTRAVENOUS; SUBCUTANEOUS at 05:23

## 2023-01-01 RX ADMIN — Medication 2 SPRAY: at 17:30

## 2023-01-01 RX ADMIN — MORPHINE SULFATE 30 MG: 15 TABLET ORAL at 20:41

## 2023-01-01 RX ADMIN — LACTULOSE 30 ML: 20 SOLUTION ORAL at 13:00

## 2023-01-01 RX ADMIN — AMIODARONE HYDROCHLORIDE 400 MG: 200 TABLET ORAL at 05:16

## 2023-01-01 RX ADMIN — MAGNESIUM SULFATE HEPTAHYDRATE 2 G: 2 INJECTION, SOLUTION INTRAVENOUS at 09:50

## 2023-01-01 RX ADMIN — MORPHINE SULFATE 30 MG: 15 TABLET ORAL at 08:29

## 2023-01-01 RX ADMIN — MORPHINE SULFATE 30 MG: 15 TABLET ORAL at 14:37

## 2023-01-01 RX ADMIN — SENNOSIDES AND DOCUSATE SODIUM 2 TABLET: 50; 8.6 TABLET ORAL at 08:34

## 2023-01-01 RX ADMIN — CEFAZOLIN 2 G: 2 INJECTION, POWDER, FOR SOLUTION INTRAMUSCULAR; INTRAVENOUS at 22:54

## 2023-01-01 RX ADMIN — PREGABALIN 150 MG: 150 CAPSULE ORAL at 14:32

## 2023-01-01 RX ADMIN — MORPHINE SULFATE 30 MG: 15 TABLET ORAL at 08:23

## 2023-01-01 RX ADMIN — HYDROMORPHONE HYDROCHLORIDE 0.5 MG: 1 INJECTION, SOLUTION INTRAMUSCULAR; INTRAVENOUS; SUBCUTANEOUS at 20:30

## 2023-01-01 RX ADMIN — CEFAZOLIN 2 G: 2 INJECTION, POWDER, FOR SOLUTION INTRAMUSCULAR; INTRAVENOUS at 21:16

## 2023-01-01 RX ADMIN — MORPHINE SULFATE 15 MG: 15 TABLET, FILM COATED, EXTENDED RELEASE ORAL at 06:31

## 2023-01-01 RX ADMIN — SENNOSIDES AND DOCUSATE SODIUM 2 TABLET: 50; 8.6 TABLET ORAL at 20:39

## 2023-01-01 RX ADMIN — HYDROMORPHONE HYDROCHLORIDE 1 MG: 1 INJECTION, SOLUTION INTRAMUSCULAR; INTRAVENOUS; SUBCUTANEOUS at 06:25

## 2023-01-01 RX ADMIN — CEFAZOLIN 2 G: 2 INJECTION, POWDER, FOR SOLUTION INTRAMUSCULAR; INTRAVENOUS at 14:33

## 2023-01-01 RX ADMIN — HYDROMORPHONE HYDROCHLORIDE 0.5 MG: 1 INJECTION, SOLUTION INTRAMUSCULAR; INTRAVENOUS; SUBCUTANEOUS at 04:40

## 2023-01-01 RX ADMIN — MORPHINE SULFATE 30 MG: 15 TABLET ORAL at 22:44

## 2023-01-01 RX ADMIN — DAPAGLIFLOZIN 10 MG: 10 TABLET, FILM COATED ORAL at 08:42

## 2023-01-01 RX ADMIN — SODIUM CHLORIDE, PRESERVATIVE FREE 20 ML: 5 INJECTION INTRAVENOUS at 21:21

## 2023-01-01 RX ADMIN — ACETAMINOPHEN 1000 MG: 500 TABLET ORAL at 21:57

## 2023-01-01 RX ADMIN — HYDROMORPHONE HYDROCHLORIDE 0.2 MG: 1 INJECTION, SOLUTION INTRAMUSCULAR; INTRAVENOUS; SUBCUTANEOUS at 17:29

## 2023-01-01 RX ADMIN — MORPHINE SULFATE 30 MG: 15 TABLET ORAL at 18:17

## 2023-01-01 RX ADMIN — NICOTINE 7 MG: 7 PATCH, EXTENDED RELEASE TRANSDERMAL at 05:47

## 2023-01-01 RX ADMIN — MORPHINE SULFATE 30 MG: 15 TABLET ORAL at 02:16

## 2023-01-01 RX ADMIN — SALINE NASAL SPRAY 2 SPRAY: 1.5 SOLUTION NASAL at 21:15

## 2023-01-01 RX ADMIN — MORPHINE SULFATE 15 MG: 15 TABLET, FILM COATED, EXTENDED RELEASE ORAL at 17:08

## 2023-01-01 RX ADMIN — POTASSIUM CHLORIDE 20 MEQ: 14.9 INJECTION, SOLUTION INTRAVENOUS at 00:30

## 2023-01-01 RX ADMIN — CEFAZOLIN 2 G: 2 INJECTION, POWDER, FOR SOLUTION INTRAMUSCULAR; INTRAVENOUS at 05:11

## 2023-01-01 RX ADMIN — HYDROMORPHONE HYDROCHLORIDE 0.5 MG: 1 INJECTION, SOLUTION INTRAMUSCULAR; INTRAVENOUS; SUBCUTANEOUS at 13:53

## 2023-01-01 RX ADMIN — HEPARIN SODIUM: 1000 INJECTION, SOLUTION INTRAVENOUS; SUBCUTANEOUS at 16:48

## 2023-01-01 RX ADMIN — MORPHINE SULFATE 30 MG: 30 TABLET, FILM COATED, EXTENDED RELEASE ORAL at 20:42

## 2023-01-01 RX ADMIN — SENNOSIDES AND DOCUSATE SODIUM 2 TABLET: 50; 8.6 TABLET ORAL at 20:24

## 2023-01-01 RX ADMIN — DOCUSATE SODIUM 10 MG: 50 LIQUID ORAL at 08:27

## 2023-01-01 RX ADMIN — DOCUSATE SODIUM 50 MG AND SENNOSIDES 8.6 MG 2 TABLET: 8.6; 5 TABLET, FILM COATED ORAL at 17:16

## 2023-01-01 SDOH — ECONOMIC STABILITY: TRANSPORTATION INSECURITY
IN THE PAST 12 MONTHS, HAS LACK OF RELIABLE TRANSPORTATION KEPT YOU FROM MEDICAL APPOINTMENTS, MEETINGS, WORK OR FROM GETTING THINGS NEEDED FOR DAILY LIVING?: NO

## 2023-01-01 SDOH — ECONOMIC STABILITY: TRANSPORTATION INSECURITY
IN THE PAST 12 MONTHS, HAS THE LACK OF TRANSPORTATION KEPT YOU FROM MEDICAL APPOINTMENTS OR FROM GETTING MEDICATIONS?: NO

## 2023-01-01 SDOH — ECONOMIC STABILITY: TRANSPORTATION INSECURITY

## 2023-01-01 SDOH — ECONOMIC STABILITY: TRANSPORTATION INSECURITY
IN THE PAST 12 MONTHS, HAS THE LACK OF TRANSPORTATION KEPT YOU FROM MEDICAL APPOINTMENTS OR FROM GETTING MEDICATIONS?: YES

## 2023-01-01 ASSESSMENT — COGNITIVE AND FUNCTIONAL STATUS - GENERAL
STANDING UP FROM CHAIR USING ARMS: TOTAL
TURNING FROM BACK TO SIDE WHILE IN FLAT BAD: A LITTLE
DRESSING REGULAR UPPER BODY CLOTHING: A LOT
SUGGESTED CMS G CODE MODIFIER DAILY ACTIVITY: CL
SUGGESTED CMS G CODE MODIFIER MOBILITY: CK
TOILETING: A LITTLE
SUGGESTED CMS G CODE MODIFIER DAILY ACTIVITY: CJ
DRESSING REGULAR UPPER BODY CLOTHING: A LITTLE
MOVING TO AND FROM BED TO CHAIR: UNABLE
MOVING FROM LYING ON BACK TO SITTING ON SIDE OF FLAT BED: UNABLE
DAILY ACTIVITIY SCORE: 10
EATING MEALS: A LITTLE
MOVING TO AND FROM BED TO CHAIR: A LOT
CLIMB 3 TO 5 STEPS WITH RAILING: TOTAL
PERSONAL GROOMING: A LITTLE
DAILY ACTIVITIY SCORE: 16
WALKING IN HOSPITAL ROOM: TOTAL
WALKING IN HOSPITAL ROOM: A LOT
CLIMB 3 TO 5 STEPS WITH RAILING: TOTAL
DRESSING REGULAR LOWER BODY CLOTHING: A LOT
SUGGESTED CMS G CODE MODIFIER MOBILITY: CL
MOVING FROM LYING ON BACK TO SITTING ON SIDE OF FLAT BED: UNABLE
WALKING IN HOSPITAL ROOM: TOTAL
SUGGESTED CMS G CODE MODIFIER DAILY ACTIVITY: CK
CLIMB 3 TO 5 STEPS WITH RAILING: TOTAL
DAILY ACTIVITIY SCORE: 22
MOBILITY SCORE: 10
SUGGESTED CMS G CODE MODIFIER MOBILITY: CL
WALKING IN HOSPITAL ROOM: TOTAL
DAILY ACTIVITIY SCORE: 14
SUGGESTED CMS G CODE MODIFIER DAILY ACTIVITY: CK
STANDING UP FROM CHAIR USING ARMS: A LOT
MOVING TO AND FROM BED TO CHAIR: UNABLE
DAILY ACTIVITIY SCORE: 6
MOBILITY SCORE: 9
TURNING FROM BACK TO SIDE WHILE IN FLAT BAD: UNABLE
MOVING FROM LYING ON BACK TO SITTING ON SIDE OF FLAT BED: UNABLE
STANDING UP FROM CHAIR USING ARMS: A LOT
CLIMB 3 TO 5 STEPS WITH RAILING: TOTAL
DAILY ACTIVITIY SCORE: 17
CLIMB 3 TO 5 STEPS WITH RAILING: A LOT
SUGGESTED CMS G CODE MODIFIER DAILY ACTIVITY: CL
WALKING IN HOSPITAL ROOM: TOTAL
MOBILITY SCORE: 13
DRESSING REGULAR UPPER BODY CLOTHING: A LOT
TURNING FROM BACK TO SIDE WHILE IN FLAT BAD: A LITTLE
CLIMB 3 TO 5 STEPS WITH RAILING: TOTAL
MOBILITY SCORE: 14
SUGGESTED CMS G CODE MODIFIER DAILY ACTIVITY: CH
TOILETING: TOTAL
MOVING TO AND FROM BED TO CHAIR: UNABLE
HELP NEEDED FOR BATHING: A LITTLE
MOVING FROM LYING ON BACK TO SITTING ON SIDE OF FLAT BED: UNABLE
MOVING FROM LYING ON BACK TO SITTING ON SIDE OF FLAT BED: A LOT
STANDING UP FROM CHAIR USING ARMS: A LOT
SUGGESTED CMS G CODE MODIFIER DAILY ACTIVITY: CK
MOBILITY SCORE: 11
SUGGESTED CMS G CODE MODIFIER MOBILITY: CL
MOVING FROM LYING ON BACK TO SITTING ON SIDE OF FLAT BED: UNABLE
TOILETING: A LOT
MOVING FROM LYING ON BACK TO SITTING ON SIDE OF FLAT BED: A LITTLE
SUGGESTED CMS G CODE MODIFIER MOBILITY: CL
STANDING UP FROM CHAIR USING ARMS: A LOT
STANDING UP FROM CHAIR USING ARMS: A LOT
DAILY ACTIVITIY SCORE: 14
DRESSING REGULAR LOWER BODY CLOTHING: A LOT
MOBILITY SCORE: 7
MOBILITY SCORE: 10
DAILY ACTIVITIY SCORE: 12
DAILY ACTIVITIY SCORE: 14
TOILETING: A LITTLE
MOVING TO AND FROM BED TO CHAIR: A LITTLE
WALKING IN HOSPITAL ROOM: TOTAL
DRESSING REGULAR UPPER BODY CLOTHING: TOTAL
TOILETING: TOTAL
TOILETING: A LOT
DRESSING REGULAR LOWER BODY CLOTHING: TOTAL
HELP NEEDED FOR BATHING: A LOT
DAILY ACTIVITIY SCORE: 19
STANDING UP FROM CHAIR USING ARMS: A LITTLE
PERSONAL GROOMING: A LITTLE
WALKING IN HOSPITAL ROOM: TOTAL
SUGGESTED CMS G CODE MODIFIER MOBILITY: CM
SUGGESTED CMS G CODE MODIFIER DAILY ACTIVITY: CK
WALKING IN HOSPITAL ROOM: TOTAL
PERSONAL GROOMING: A LITTLE
TURNING FROM BACK TO SIDE WHILE IN FLAT BAD: UNABLE
DRESSING REGULAR UPPER BODY CLOTHING: A LITTLE
MOVING FROM LYING ON BACK TO SITTING ON SIDE OF FLAT BED: UNABLE
HELP NEEDED FOR BATHING: A LOT
TURNING FROM BACK TO SIDE WHILE IN FLAT BAD: A LOT
TURNING FROM BACK TO SIDE WHILE IN FLAT BAD: UNABLE
TURNING FROM BACK TO SIDE WHILE IN FLAT BAD: UNABLE
STANDING UP FROM CHAIR USING ARMS: TOTAL
DRESSING REGULAR LOWER BODY CLOTHING: A LOT
HELP NEEDED FOR BATHING: A LOT
STANDING UP FROM CHAIR USING ARMS: TOTAL
MOVING TO AND FROM BED TO CHAIR: A LITTLE
DAILY ACTIVITIY SCORE: 18
DRESSING REGULAR LOWER BODY CLOTHING: A LOT
EATING MEALS: A LITTLE
SUGGESTED CMS G CODE MODIFIER DAILY ACTIVITY: CK
DRESSING REGULAR UPPER BODY CLOTHING: A LITTLE
TOILETING: TOTAL
SUGGESTED CMS G CODE MODIFIER DAILY ACTIVITY: CK
MOVING TO AND FROM BED TO CHAIR: A LOT
TURNING FROM BACK TO SIDE WHILE IN FLAT BAD: UNABLE
MOBILITY SCORE: 7
TURNING FROM BACK TO SIDE WHILE IN FLAT BAD: A LITTLE
TURNING FROM BACK TO SIDE WHILE IN FLAT BAD: A LITTLE
MOVING FROM LYING ON BACK TO SITTING ON SIDE OF FLAT BED: UNABLE
EATING MEALS: A LITTLE
SUGGESTED CMS G CODE MODIFIER MOBILITY: CM
TURNING FROM BACK TO SIDE WHILE IN FLAT BAD: UNABLE
CLIMB 3 TO 5 STEPS WITH RAILING: TOTAL
WALKING IN HOSPITAL ROOM: TOTAL
DAILY ACTIVITIY SCORE: 7
PERSONAL GROOMING: A LITTLE
STANDING UP FROM CHAIR USING ARMS: TOTAL
PERSONAL GROOMING: A LOT
HELP NEEDED FOR BATHING: A LITTLE
WALKING IN HOSPITAL ROOM: TOTAL
HELP NEEDED FOR BATHING: A LOT
WALKING IN HOSPITAL ROOM: TOTAL
CLIMB 3 TO 5 STEPS WITH RAILING: TOTAL
HELP NEEDED FOR BATHING: A LOT
HELP NEEDED FOR BATHING: TOTAL
TOILETING: A LOT
TOILETING: A LOT
TURNING FROM BACK TO SIDE WHILE IN FLAT BAD: A LITTLE
WALKING IN HOSPITAL ROOM: TOTAL
SUGGESTED CMS G CODE MODIFIER DAILY ACTIVITY: CM
MOBILITY SCORE: 6
CLIMB 3 TO 5 STEPS WITH RAILING: TOTAL
PERSONAL GROOMING: A LITTLE
PERSONAL GROOMING: TOTAL
MOVING TO AND FROM BED TO CHAIR: UNABLE
MOVING FROM LYING ON BACK TO SITTING ON SIDE OF FLAT BED: UNABLE
MOVING TO AND FROM BED TO CHAIR: UNABLE
MOBILITY SCORE: 7
MOBILITY SCORE: 7
CLIMB 3 TO 5 STEPS WITH RAILING: TOTAL
STANDING UP FROM CHAIR USING ARMS: A LOT
MOBILITY SCORE: 6
DRESSING REGULAR LOWER BODY CLOTHING: TOTAL
MOVING TO AND FROM BED TO CHAIR: A LITTLE
SUGGESTED CMS G CODE MODIFIER DAILY ACTIVITY: CK
DAILY ACTIVITIY SCORE: 18
WALKING IN HOSPITAL ROOM: A LOT
STANDING UP FROM CHAIR USING ARMS: A LOT
STANDING UP FROM CHAIR USING ARMS: A LOT
CLIMB 3 TO 5 STEPS WITH RAILING: TOTAL
DAILY ACTIVITIY SCORE: 17
SUGGESTED CMS G CODE MODIFIER DAILY ACTIVITY: CL
MOVING TO AND FROM BED TO CHAIR: UNABLE
MOBILITY SCORE: 9
HELP NEEDED FOR BATHING: TOTAL
SUGGESTED CMS G CODE MODIFIER DAILY ACTIVITY: CK
MOVING FROM LYING ON BACK TO SITTING ON SIDE OF FLAT BED: UNABLE
DAILY ACTIVITIY SCORE: 24
CLIMB 3 TO 5 STEPS WITH RAILING: TOTAL
TURNING FROM BACK TO SIDE WHILE IN FLAT BAD: A LOT
TOILETING: A LITTLE
CLIMB 3 TO 5 STEPS WITH RAILING: TOTAL
MOVING TO AND FROM BED TO CHAIR: A LITTLE
TOILETING: TOTAL
PERSONAL GROOMING: A LITTLE
MOBILITY SCORE: 10
SUGGESTED CMS G CODE MODIFIER MOBILITY: CM
MOBILITY SCORE: 11
PERSONAL GROOMING: A LOT
HELP NEEDED FOR BATHING: A LOT
DRESSING REGULAR LOWER BODY CLOTHING: TOTAL
EATING MEALS: TOTAL
MOVING FROM LYING ON BACK TO SITTING ON SIDE OF FLAT BED: UNABLE
DRESSING REGULAR LOWER BODY CLOTHING: A LOT
EATING MEALS: A LITTLE
PERSONAL GROOMING: TOTAL
CLIMB 3 TO 5 STEPS WITH RAILING: A LOT
TURNING FROM BACK TO SIDE WHILE IN FLAT BAD: A LOT
EATING MEALS: A LITTLE
WALKING IN HOSPITAL ROOM: TOTAL
TOILETING: A LOT
STANDING UP FROM CHAIR USING ARMS: A LOT
SUGGESTED CMS G CODE MODIFIER MOBILITY: CM
STANDING UP FROM CHAIR USING ARMS: TOTAL
DRESSING REGULAR LOWER BODY CLOTHING: A LOT
PERSONAL GROOMING: A LITTLE
HELP NEEDED FOR BATHING: A LOT
SUGGESTED CMS G CODE MODIFIER MOBILITY: CL
SUGGESTED CMS G CODE MODIFIER MOBILITY: CM
MOBILITY SCORE: 7
SUGGESTED CMS G CODE MODIFIER MOBILITY: CN
TOILETING: A LOT
CLIMB 3 TO 5 STEPS WITH RAILING: TOTAL
MOVING TO AND FROM BED TO CHAIR: UNABLE
STANDING UP FROM CHAIR USING ARMS: TOTAL
DRESSING REGULAR UPPER BODY CLOTHING: A LITTLE
MOVING FROM LYING ON BACK TO SITTING ON SIDE OF FLAT BED: UNABLE
SUGGESTED CMS G CODE MODIFIER DAILY ACTIVITY: CN
HELP NEEDED FOR BATHING: A LOT
MOVING FROM LYING ON BACK TO SITTING ON SIDE OF FLAT BED: UNABLE
MOVING TO AND FROM BED TO CHAIR: A LITTLE
MOVING FROM LYING ON BACK TO SITTING ON SIDE OF FLAT BED: A LITTLE
DAILY ACTIVITIY SCORE: 15
SUGGESTED CMS G CODE MODIFIER MOBILITY: CN
TOILETING: TOTAL
DRESSING REGULAR LOWER BODY CLOTHING: TOTAL
SUGGESTED CMS G CODE MODIFIER DAILY ACTIVITY: CK
TURNING FROM BACK TO SIDE WHILE IN FLAT BAD: A LITTLE
MOBILITY SCORE: 7
DRESSING REGULAR UPPER BODY CLOTHING: A LOT
HELP NEEDED FOR BATHING: TOTAL
MOVING FROM LYING ON BACK TO SITTING ON SIDE OF FLAT BED: UNABLE
MOVING FROM LYING ON BACK TO SITTING ON SIDE OF FLAT BED: UNABLE
DRESSING REGULAR LOWER BODY CLOTHING: A LOT
TURNING FROM BACK TO SIDE WHILE IN FLAT BAD: A LITTLE
WALKING IN HOSPITAL ROOM: TOTAL
MOVING FROM LYING ON BACK TO SITTING ON SIDE OF FLAT BED: UNABLE
DRESSING REGULAR LOWER BODY CLOTHING: A LOT
MOVING TO AND FROM BED TO CHAIR: A LITTLE
WALKING IN HOSPITAL ROOM: TOTAL
HELP NEEDED FOR BATHING: A LOT
WALKING IN HOSPITAL ROOM: TOTAL
TOILETING: A LOT
MOVING FROM LYING ON BACK TO SITTING ON SIDE OF FLAT BED: UNABLE
STANDING UP FROM CHAIR USING ARMS: TOTAL
DRESSING REGULAR UPPER BODY CLOTHING: A LITTLE
DRESSING REGULAR UPPER BODY CLOTHING: A LOT
TOILETING: TOTAL
DRESSING REGULAR LOWER BODY CLOTHING: A LOT
MOBILITY SCORE: 12
SUGGESTED CMS G CODE MODIFIER DAILY ACTIVITY: CK
EATING MEALS: A LITTLE
WALKING IN HOSPITAL ROOM: TOTAL
TURNING FROM BACK TO SIDE WHILE IN FLAT BAD: A LOT
CLIMB 3 TO 5 STEPS WITH RAILING: TOTAL
DRESSING REGULAR UPPER BODY CLOTHING: A LITTLE
MOBILITY SCORE: 16
TURNING FROM BACK TO SIDE WHILE IN FLAT BAD: A LITTLE
WALKING IN HOSPITAL ROOM: TOTAL
STANDING UP FROM CHAIR USING ARMS: A LITTLE
MOVING FROM LYING ON BACK TO SITTING ON SIDE OF FLAT BED: UNABLE
MOVING TO AND FROM BED TO CHAIR: UNABLE
DRESSING REGULAR UPPER BODY CLOTHING: A LOT
CLIMB 3 TO 5 STEPS WITH RAILING: TOTAL
STANDING UP FROM CHAIR USING ARMS: A LITTLE
CLIMB 3 TO 5 STEPS WITH RAILING: TOTAL
CLIMB 3 TO 5 STEPS WITH RAILING: TOTAL
MOBILITY SCORE: 9
MOVING TO AND FROM BED TO CHAIR: A LITTLE
TURNING FROM BACK TO SIDE WHILE IN FLAT BAD: A LITTLE
SUGGESTED CMS G CODE MODIFIER MOBILITY: CL
CLIMB 3 TO 5 STEPS WITH RAILING: TOTAL
CLIMB 3 TO 5 STEPS WITH RAILING: TOTAL
DAILY ACTIVITIY SCORE: 17
SUGGESTED CMS G CODE MODIFIER MOBILITY: CM
DAILY ACTIVITIY SCORE: 17
STANDING UP FROM CHAIR USING ARMS: TOTAL
SUGGESTED CMS G CODE MODIFIER MOBILITY: CM
HELP NEEDED FOR BATHING: A LITTLE
WALKING IN HOSPITAL ROOM: TOTAL
MOVING TO AND FROM BED TO CHAIR: A LITTLE
SUGGESTED CMS G CODE MODIFIER MOBILITY: CL
STANDING UP FROM CHAIR USING ARMS: A LOT
MOVING TO AND FROM BED TO CHAIR: A LOT
SUGGESTED CMS G CODE MODIFIER MOBILITY: CL
EATING MEALS: A LITTLE
WALKING IN HOSPITAL ROOM: TOTAL
HELP NEEDED FOR BATHING: TOTAL
SUGGESTED CMS G CODE MODIFIER DAILY ACTIVITY: CK
MOVING TO AND FROM BED TO CHAIR: UNABLE
PERSONAL GROOMING: A LOT
TURNING FROM BACK TO SIDE WHILE IN FLAT BAD: A LITTLE
DRESSING REGULAR UPPER BODY CLOTHING: A LOT
HELP NEEDED FOR BATHING: A LOT
MOVING TO AND FROM BED TO CHAIR: UNABLE
PERSONAL GROOMING: A LITTLE
MOVING TO AND FROM BED TO CHAIR: A LOT
WALKING IN HOSPITAL ROOM: TOTAL
DRESSING REGULAR LOWER BODY CLOTHING: A LOT
DRESSING REGULAR LOWER BODY CLOTHING: A LOT
SUGGESTED CMS G CODE MODIFIER MOBILITY: CM
DAILY ACTIVITIY SCORE: 10
DRESSING REGULAR UPPER BODY CLOTHING: TOTAL
TOILETING: A LOT
CLIMB 3 TO 5 STEPS WITH RAILING: TOTAL
SUGGESTED CMS G CODE MODIFIER DAILY ACTIVITY: CK
DRESSING REGULAR UPPER BODY CLOTHING: A LITTLE
MOVING FROM LYING ON BACK TO SITTING ON SIDE OF FLAT BED: UNABLE
MOBILITY SCORE: 11
SUGGESTED CMS G CODE MODIFIER MOBILITY: CM
EATING MEALS: A LOT
DRESSING REGULAR LOWER BODY CLOTHING: A LITTLE
SUGGESTED CMS G CODE MODIFIER MOBILITY: CL
MOBILITY SCORE: 7
TURNING FROM BACK TO SIDE WHILE IN FLAT BAD: A LITTLE
STANDING UP FROM CHAIR USING ARMS: A LOT
TURNING FROM BACK TO SIDE WHILE IN FLAT BAD: A LITTLE
SUGGESTED CMS G CODE MODIFIER MOBILITY: CM
DRESSING REGULAR LOWER BODY CLOTHING: TOTAL
MOVING FROM LYING ON BACK TO SITTING ON SIDE OF FLAT BED: UNABLE
DRESSING REGULAR LOWER BODY CLOTHING: A LOT

## 2023-01-01 ASSESSMENT — ENCOUNTER SYMPTOMS
SENSORY CHANGE: 0
DOUBLE VISION: 0
MUSCULOSKELETAL NEGATIVE: 1
CHILLS: 0
NERVOUS/ANXIOUS: 0
BLURRED VISION: 0
DIARRHEA: 0
DIARRHEA: 0
SENSORY CHANGE: 0
EYE REDNESS: 0
CHILLS: 0
COUGH: 0
TREMORS: 0
DIARRHEA: 0
HEARTBURN: 0
NAUSEA: 0
PALPITATIONS: 0
INSOMNIA: 0
VOMITING: 0
WEAKNESS: 0
FALLS: 0
DIZZINESS: 0
FEVER: 0
HEMOPTYSIS: 0
MUSCULOSKELETAL NEGATIVE: 1
ABDOMINAL PAIN: 0
SHORTNESS OF BREATH: 0
FEVER: 1
VOMITING: 1
MUSCULOSKELETAL NEGATIVE: 1
HEADACHES: 0
TINGLING: 1
TINGLING: 1
BLURRED VISION: 0
CHILLS: 0
DIZZINESS: 0
EYE PAIN: 0
ORTHOPNEA: 0
PALPITATIONS: 0
CHILLS: 0
SHORTNESS OF BREATH: 1
FEVER: 0
BRUISES/BLEEDS EASILY: 0
CHILLS: 0
ABDOMINAL PAIN: 0
ABDOMINAL PAIN: 0
WEIGHT LOSS: 0
LOSS OF CONSCIOUSNESS: 0
DIAPHORESIS: 0
NERVOUS/ANXIOUS: 1
SPUTUM PRODUCTION: 0
VOMITING: 0
NECK PAIN: 0
SEIZURES: 0
DOUBLE VISION: 0
FALLS: 0
SORE THROAT: 0
HEADACHES: 0
FALLS: 0
VOMITING: 0
MYALGIAS: 0
VOMITING: 0
EYES NEGATIVE: 1
FOCAL WEAKNESS: 0
NAUSEA: 0
FATIGUE: 1
VOMITING: 0
COUGH: 0
CHILLS: 0
FEVER: 0
TREMORS: 0
ORTHOPNEA: 0
NAUSEA: 0
COUGH: 0
COUGH: 0
BLURRED VISION: 0
SHORTNESS OF BREATH: 1
COUGH: 0
DIZZINESS: 0
HEADACHES: 0
ABDOMINAL PAIN: 0
FEVER: 0
SHORTNESS OF BREATH: 0
DIZZINESS: 0
FALLS: 0
WHEEZING: 0
CHOKING: 0
SHORTNESS OF BREATH: 0
NERVOUS/ANXIOUS: 1
DIZZINESS: 0
FEVER: 0
FEVER: 0
ABDOMINAL PAIN: 0
BLOOD IN STOOL: 0
CHILLS: 0
HEARTBURN: 0
VOMITING: 0
PALPITATIONS: 0
NERVOUS/ANXIOUS: 0
HEARTBURN: 0
INSOMNIA: 0
NAUSEA: 0
PALPITATIONS: 0
FOCAL WEAKNESS: 0
PALPITATIONS: 0
VOMITING: 0
NAUSEA: 0
BLURRED VISION: 0
NERVOUS/ANXIOUS: 0
CHILLS: 0
POLYDIPSIA: 0
BACK PAIN: 0
ABDOMINAL PAIN: 0
DOUBLE VISION: 0
ORTHOPNEA: 0
HALLUCINATIONS: 0
COUGH: 0
CHILLS: 0
CHILLS: 0
EYE PAIN: 0
HEARTBURN: 0
EYES NEGATIVE: 1
HEADACHES: 0
HEADACHES: 0
SHORTNESS OF BREATH: 0
SORE THROAT: 0
VOMITING: 0
SHORTNESS OF BREATH: 0
ABDOMINAL PAIN: 0
HEADACHES: 0
POLYDIPSIA: 0
SHORTNESS OF BREATH: 1
BLURRED VISION: 0
HEADACHES: 0
HEARTBURN: 0
TREMORS: 0
ABDOMINAL PAIN: 0
SHORTNESS OF BREATH: 0
NERVOUS/ANXIOUS: 0
FEVER: 0
HEADACHES: 0
WEAKNESS: 1
HEADACHES: 0
SORE THROAT: 0
SHORTNESS OF BREATH: 0
DIZZINESS: 0
VOMITING: 0
SHORTNESS OF BREATH: 1
TREMORS: 0
HEADACHES: 0
WEAKNESS: 0
DEPRESSION: 0
SHORTNESS OF BREATH: 0
BRUISES/BLEEDS EASILY: 0
TINGLING: 0
BLOOD IN STOOL: 0
FOCAL WEAKNESS: 0
EYES NEGATIVE: 1
DIARRHEA: 0
SHORTNESS OF BREATH: 1
VOMITING: 0
ABDOMINAL PAIN: 0
BLOOD IN STOOL: 0
GASTROINTESTINAL NEGATIVE: 1
MYALGIAS: 0
CHILLS: 0
SHORTNESS OF BREATH: 0
VOMITING: 0
ORTHOPNEA: 0
NERVOUS/ANXIOUS: 1
ORTHOPNEA: 0
CHILLS: 0
DIZZINESS: 0
PALPITATIONS: 0
CHILLS: 0
BRUISES/BLEEDS EASILY: 0
POLYDIPSIA: 0
DIARRHEA: 0
NAUSEA: 0
MYALGIAS: 1
DIZZINESS: 0
FALLS: 0
COUGH: 1
VOMITING: 0
FALLS: 0
BLURRED VISION: 0
SPEECH CHANGE: 0
PALPITATIONS: 0
BRUISES/BLEEDS EASILY: 0
DIARRHEA: 0
FEVER: 1
MYALGIAS: 1
FEVER: 0
BRUISES/BLEEDS EASILY: 0
COUGH: 0
HEADACHES: 0
HEARTBURN: 0
MYALGIAS: 1
WEAKNESS: 1
SHORTNESS OF BREATH: 0
BLURRED VISION: 0
COUGH: 0
LOSS OF CONSCIOUSNESS: 0
DIZZINESS: 0
EYES NEGATIVE: 1
NECK PAIN: 1
FOCAL WEAKNESS: 0
VOMITING: 0
PALPITATIONS: 0
NERVOUS/ANXIOUS: 1
ABDOMINAL PAIN: 0
PND: 0
MYALGIAS: 1
NECK PAIN: 0
FEVER: 0
CHILLS: 0
FEVER: 0
PALPITATIONS: 0
CHILLS: 0
MEMORY LOSS: 0
VOMITING: 0
SHORTNESS OF BREATH: 0
FEVER: 1
MYALGIAS: 1
ABDOMINAL PAIN: 0
BLURRED VISION: 0
SHORTNESS OF BREATH: 1
FEVER: 0
NERVOUS/ANXIOUS: 0
WEAKNESS: 1
SORE THROAT: 0
INSOMNIA: 0
HEARTBURN: 0
DOUBLE VISION: 0
BACK PAIN: 0
MYALGIAS: 0
ABDOMINAL PAIN: 0
HEMOPTYSIS: 0
VOMITING: 0
BLURRED VISION: 0
DIZZINESS: 0
FEVER: 0
SHORTNESS OF BREATH: 0
APNEA: 0
APNEA: 0
DIARRHEA: 0
HEADACHES: 0
SINUS PAIN: 0
CHILLS: 0
CHEST TIGHTNESS: 0
WHEEZING: 0
DIZZINESS: 0
HEMOPTYSIS: 0
PALPITATIONS: 0
VOMITING: 0
HEADACHES: 0
BLOOD IN STOOL: 0
BLURRED VISION: 0
MYALGIAS: 1
SHORTNESS OF BREATH: 0
NAUSEA: 0
FOCAL WEAKNESS: 0
DIARRHEA: 0
EYE PAIN: 0
COUGH: 0
ABDOMINAL PAIN: 0
HEARTBURN: 0
NAUSEA: 0
ORTHOPNEA: 0
FEVER: 0
SPEECH CHANGE: 0
PALPITATIONS: 0
HEARTBURN: 0
HALLUCINATIONS: 0
NECK PAIN: 0
ROS GI COMMENTS: TOLERATING A DIET
SHORTNESS OF BREATH: 0
PALPITATIONS: 0
COUGH: 0
VOMITING: 0
VOMITING: 0
COUGH: 1
SHORTNESS OF BREATH: 0
PALPITATIONS: 0
HEMOPTYSIS: 1
DOUBLE VISION: 0
ABDOMINAL PAIN: 0
NERVOUS/ANXIOUS: 1
CONSTIPATION: 0
BLOOD IN STOOL: 0
COUGH: 0
HEADACHES: 0
SEIZURES: 0
MUSCULOSKELETAL NEGATIVE: 1
SEIZURES: 0
NAUSEA: 0
FEVER: 0
NAUSEA: 0
SINUS PAIN: 0
COUGH: 0
WHEEZING: 0
VOMITING: 0
FEVER: 0
EYE REDNESS: 0
NAUSEA: 0
FALLS: 0
FEVER: 0
MYALGIAS: 0
FEVER: 0
DIZZINESS: 0
SHORTNESS OF BREATH: 1
NAUSEA: 0
FOCAL WEAKNESS: 0
ABDOMINAL PAIN: 0
INSOMNIA: 0
HEADACHES: 0
CHILLS: 0
TREMORS: 0
EYE REDNESS: 0
COUGH: 0
BLURRED VISION: 0
RESPIRATORY NEGATIVE: 1
WEAKNESS: 0
DIZZINESS: 0
SHORTNESS OF BREATH: 1
MYALGIAS: 1
CHILLS: 0
FOCAL WEAKNESS: 0
DIZZINESS: 0
ABDOMINAL PAIN: 0
COUGH: 1
HEADACHES: 0
COUGH: 0
BACK PAIN: 0
WEAKNESS: 0
WHEEZING: 1
BRUISES/BLEEDS EASILY: 0
FEVER: 0
FOCAL WEAKNESS: 0
HEADACHES: 0
NAUSEA: 0
FEVER: 1
NECK PAIN: 0
SHORTNESS OF BREATH: 0
WEAKNESS: 0
NERVOUS/ANXIOUS: 0
CHILLS: 0
SPUTUM PRODUCTION: 0
CHILLS: 0
FEVER: 0
WEAKNESS: 0
SEIZURES: 0
EYE PAIN: 0
FEVER: 0
DIZZINESS: 0
FEVER: 0
POLYDIPSIA: 0
WEAKNESS: 0
EYE PAIN: 0
SHORTNESS OF BREATH: 0
INSOMNIA: 0
PALPITATIONS: 0
CHILLS: 0
HEMOPTYSIS: 0
BRUISES/BLEEDS EASILY: 0
FEVER: 0
CONSTIPATION: 0
BLURRED VISION: 0
VOMITING: 0
HALLUCINATIONS: 0
NAUSEA: 0
VOMITING: 0
FOCAL WEAKNESS: 0
BLURRED VISION: 0
VOMITING: 0
EYES NEGATIVE: 1
VOMITING: 0
DOUBLE VISION: 0
BLURRED VISION: 0
VOMITING: 0
VOMITING: 0
LOSS OF CONSCIOUSNESS: 0
EYE REDNESS: 0
NAUSEA: 0
HALLUCINATIONS: 0
SENSORY CHANGE: 0
BLOOD IN STOOL: 0
WHEEZING: 1
NERVOUS/ANXIOUS: 0
ABDOMINAL PAIN: 0
HEADACHES: 0
NECK PAIN: 0
FALLS: 0
PALPITATIONS: 0
NAUSEA: 0
DIARRHEA: 0
COUGH: 0
NAUSEA: 0
VOMITING: 0
FEVER: 0
NAUSEA: 0
FALLS: 0
WHEEZING: 0
NAUSEA: 0
DOUBLE VISION: 0
WHEEZING: 0
FEVER: 0
INSOMNIA: 0
ABDOMINAL PAIN: 0
DIARRHEA: 0
FEVER: 1
DOUBLE VISION: 0
SHORTNESS OF BREATH: 1
SHORTNESS OF BREATH: 0
HEADACHES: 0
CARDIOVASCULAR NEGATIVE: 1
CHILLS: 0
BLURRED VISION: 0
FEVER: 0
VOMITING: 0
CONSTIPATION: 0
VOMITING: 0
DIARRHEA: 0
HALLUCINATIONS: 0
ABDOMINAL PAIN: 0
FEVER: 0
NERVOUS/ANXIOUS: 0
BACK PAIN: 0
COUGH: 0
DOUBLE VISION: 0
SHORTNESS OF BREATH: 0
NAUSEA: 0
BLURRED VISION: 0
BACK PAIN: 0
CONSTIPATION: 0
SHORTNESS OF BREATH: 0
FEVER: 0
DIZZINESS: 0
FEVER: 1
INSOMNIA: 0
FEVER: 0
BACK PAIN: 0
FEVER: 0
SPEECH CHANGE: 0
SHORTNESS OF BREATH: 0
HEARTBURN: 0
SORE THROAT: 1
NECK PAIN: 0
BLURRED VISION: 0
COUGH: 0
VOMITING: 0
CHILLS: 0
HEADACHES: 0
DIZZINESS: 0
PALPITATIONS: 0
BLURRED VISION: 0
EYE PAIN: 0
DIARRHEA: 0
FEVER: 0
NAUSEA: 0
POLYDIPSIA: 0
HEARTBURN: 0
HEADACHES: 0
VOMITING: 0
CHILLS: 0
VOMITING: 0
ABDOMINAL PAIN: 0
TINGLING: 1
SEIZURES: 0
HEARTBURN: 0
FEVER: 0
NAUSEA: 0
DIZZINESS: 0
ABDOMINAL PAIN: 0
EYES NEGATIVE: 1
HEADACHES: 0
NERVOUS/ANXIOUS: 1
BRUISES/BLEEDS EASILY: 0
SHORTNESS OF BREATH: 1
BLURRED VISION: 0
EYE PAIN: 0
DIARRHEA: 0
NAUSEA: 0
COUGH: 0
DOUBLE VISION: 0
COUGH: 0
SPUTUM PRODUCTION: 0
SORE THROAT: 0
SINUS PAIN: 0
WEAKNESS: 0
COUGH: 0
ORTHOPNEA: 0
BLURRED VISION: 0
DIARRHEA: 0
INSOMNIA: 1
MYALGIAS: 1
FEVER: 0
PALPITATIONS: 0
SHORTNESS OF BREATH: 0
SHORTNESS OF BREATH: 0
VOMITING: 0
NAUSEA: 0
DOUBLE VISION: 0
NAUSEA: 0
FEVER: 0
EYE REDNESS: 0
FOCAL WEAKNESS: 0
NECK PAIN: 0
FALLS: 0
TINGLING: 1
NERVOUS/ANXIOUS: 0
EYES NEGATIVE: 1
ABDOMINAL PAIN: 0
HALLUCINATIONS: 0
VOMITING: 0
CHILLS: 0
HEARTBURN: 0
HALLUCINATIONS: 0
EYES NEGATIVE: 1
SORE THROAT: 0
SHORTNESS OF BREATH: 0
NAUSEA: 0
WEAKNESS: 0
ABDOMINAL PAIN: 0
VOMITING: 0
PALPITATIONS: 0
SENSORY CHANGE: 0
SHORTNESS OF BREATH: 0
FEVER: 0
LOSS OF CONSCIOUSNESS: 0
EYES NEGATIVE: 1
BLURRED VISION: 0
SPEECH CHANGE: 0
FEVER: 0
COUGH: 0
BACK PAIN: 0
CONSTITUTIONAL NEGATIVE: 1
NAUSEA: 0
HEADACHES: 0
VOMITING: 0
FALLS: 0
FEVER: 0
CHILLS: 0
PALPITATIONS: 0
PALPITATIONS: 0
DOUBLE VISION: 0
DIZZINESS: 0
SINUS PAIN: 0
FALLS: 0
COUGH: 0
SPUTUM PRODUCTION: 0
COUGH: 0
SHORTNESS OF BREATH: 0
PALPITATIONS: 0
CHILLS: 0
SHORTNESS OF BREATH: 0
FEVER: 0
VOMITING: 0
DIZZINESS: 0
MYALGIAS: 0
BLOOD IN STOOL: 0
SPUTUM PRODUCTION: 0
SHORTNESS OF BREATH: 0
SPEECH CHANGE: 0
HEADACHES: 0
COUGH: 0
COUGH: 0
FOCAL WEAKNESS: 0
PALPITATIONS: 0
MYALGIAS: 0
PHOTOPHOBIA: 0
WEIGHT LOSS: 0
FALLS: 0
FEVER: 0
SHORTNESS OF BREATH: 0
TREMORS: 0
NERVOUS/ANXIOUS: 1
CHILLS: 0
SEIZURES: 0
FALLS: 0
DIARRHEA: 0
COUGH: 0
SHORTNESS OF BREATH: 0
POLYDIPSIA: 0
CHILLS: 0
DOUBLE VISION: 0
MUSCULOSKELETAL NEGATIVE: 1
MYALGIAS: 1
SENSORY CHANGE: 0
HEMOPTYSIS: 0
BLURRED VISION: 0
BRUISES/BLEEDS EASILY: 0
POLYDIPSIA: 0
EYE PAIN: 0
MYALGIAS: 0
EYE REDNESS: 0
HALLUCINATIONS: 0
PALPITATIONS: 0
FEVER: 0
ORTHOPNEA: 0
FOCAL WEAKNESS: 0
CONSTIPATION: 0
BLOOD IN STOOL: 0
VOMITING: 1
FEVER: 0
SHORTNESS OF BREATH: 0
FEVER: 1
SPUTUM PRODUCTION: 0
DIARRHEA: 0
FALLS: 0
DIZZINESS: 0
FEVER: 0
SHORTNESS OF BREATH: 0
NERVOUS/ANXIOUS: 0
BLOOD IN STOOL: 0
FEVER: 0
SHORTNESS OF BREATH: 0
ABDOMINAL PAIN: 0
ABDOMINAL PAIN: 0
PALPITATIONS: 0
SORE THROAT: 0
ORTHOPNEA: 0
MYALGIAS: 0
POLYDIPSIA: 0
MYALGIAS: 1
EYE PAIN: 0
WEAKNESS: 0
PALPITATIONS: 0
MYALGIAS: 1
PALPITATIONS: 0
DIZZINESS: 0
SPUTUM PRODUCTION: 0
NAUSEA: 0
FOCAL WEAKNESS: 0
CHILLS: 0
NEUROLOGICAL NEGATIVE: 1
ABDOMINAL PAIN: 0
BLURRED VISION: 0
COUGH: 0
HEADACHES: 0
SHORTNESS OF BREATH: 0
ABDOMINAL PAIN: 0
TINGLING: 1
MYALGIAS: 0
MYALGIAS: 1
NAUSEA: 1
CHILLS: 0
FEVER: 0
DIZZINESS: 0
BACK PAIN: 0
ABDOMINAL PAIN: 0
EYE PAIN: 0
HEARTBURN: 0
DIARRHEA: 0
NAUSEA: 0
VOMITING: 0
BLOOD IN STOOL: 0
BLURRED VISION: 0
NERVOUS/ANXIOUS: 0
COUGH: 0
WEIGHT LOSS: 0
NAUSEA: 0
NAUSEA: 0
PALPITATIONS: 0
FALLS: 0
MYALGIAS: 1
INSOMNIA: 1
SINUS PAIN: 0
NERVOUS/ANXIOUS: 0
ORTHOPNEA: 0
ORTHOPNEA: 0
BLURRED VISION: 0
MUSCULOSKELETAL NEGATIVE: 1
PSYCHIATRIC NEGATIVE: 1
GASTROINTESTINAL NEGATIVE: 1
DIARRHEA: 1
EYE PAIN: 0
PALPITATIONS: 0
EYE PAIN: 0
WEAKNESS: 1
EYE DISCHARGE: 0
SHORTNESS OF BREATH: 0
CHILLS: 0
HEADACHES: 0
BACK PAIN: 0
HEARTBURN: 0
NERVOUS/ANXIOUS: 0
TREMORS: 0
TREMORS: 0
NECK PAIN: 1
WEAKNESS: 0
DIARRHEA: 0
HEADACHES: 0
FEVER: 0
DIZZINESS: 0
NAUSEA: 0
ORTHOPNEA: 0
TINGLING: 1
MYALGIAS: 1
BLURRED VISION: 0
PALPITATIONS: 0
SHORTNESS OF BREATH: 0
ABDOMINAL PAIN: 0
FALLS: 0
NAUSEA: 0
DIARRHEA: 0
CONSTIPATION: 0
NERVOUS/ANXIOUS: 1
DOUBLE VISION: 0
PSYCHIATRIC NEGATIVE: 1
ORTHOPNEA: 0
CHILLS: 0
MUSCULOSKELETAL NEGATIVE: 1
STRIDOR: 0
DOUBLE VISION: 0
FEVER: 0
NAUSEA: 0
FOCAL WEAKNESS: 0
EYES NEGATIVE: 1
BLURRED VISION: 0
NERVOUS/ANXIOUS: 0
ABDOMINAL PAIN: 0
NAUSEA: 0
SHORTNESS OF BREATH: 0
SORE THROAT: 0
DIARRHEA: 0
PALPITATIONS: 0
SORE THROAT: 0
PALPITATIONS: 0
VOMITING: 0
PALPITATIONS: 0
FOCAL WEAKNESS: 0
ABDOMINAL PAIN: 0
ABDOMINAL PAIN: 0
MYALGIAS: 0
COUGH: 0
NERVOUS/ANXIOUS: 1
SHORTNESS OF BREATH: 0
ABDOMINAL PAIN: 0
CHILLS: 0
BLURRED VISION: 0
COUGH: 0
NAUSEA: 0
SHORTNESS OF BREATH: 1
VOMITING: 0
ORTHOPNEA: 0
FEVER: 0
DIZZINESS: 0
WHEEZING: 0
HEARTBURN: 0
EYE DISCHARGE: 0
NAUSEA: 0
FEVER: 0
BRUISES/BLEEDS EASILY: 0
PALPITATIONS: 1
NERVOUS/ANXIOUS: 0
DOUBLE VISION: 0
FOCAL WEAKNESS: 0
CHILLS: 0
SORE THROAT: 0
MUSCULOSKELETAL NEGATIVE: 1
SHORTNESS OF BREATH: 0
MUSCULOSKELETAL NEGATIVE: 1
MYALGIAS: 1
EYE REDNESS: 0
ABDOMINAL PAIN: 0
DIARRHEA: 0
CHILLS: 0
CHOKING: 0
SHORTNESS OF BREATH: 0
ABDOMINAL PAIN: 0
NERVOUS/ANXIOUS: 0
BLURRED VISION: 0
BLOOD IN STOOL: 0
FALLS: 0
PALPITATIONS: 0
EYES NEGATIVE: 1
LOSS OF CONSCIOUSNESS: 0
NERVOUS/ANXIOUS: 0
HEARTBURN: 0
EYES NEGATIVE: 1
WEAKNESS: 0
SHORTNESS OF BREATH: 0
FEVER: 0
HEADACHES: 0
BACK PAIN: 0
SHORTNESS OF BREATH: 0
ORTHOPNEA: 0
FEVER: 0
FOCAL WEAKNESS: 0
PALPITATIONS: 0
CARDIOVASCULAR NEGATIVE: 1
COUGH: 0
HEADACHES: 0
SPUTUM PRODUCTION: 1
NAUSEA: 0
POLYDIPSIA: 0
SORE THROAT: 0
ABDOMINAL PAIN: 0
COUGH: 0
SHORTNESS OF BREATH: 0
MYALGIAS: 0
NAUSEA: 0
BACK PAIN: 0
BLURRED VISION: 0
SHORTNESS OF BREATH: 0
EYES NEGATIVE: 1
DOUBLE VISION: 0
ABDOMINAL PAIN: 0
SINUS PAIN: 0
PALPITATIONS: 0
DIARRHEA: 0
CHILLS: 0
CONSTIPATION: 0
DIZZINESS: 0
SHORTNESS OF BREATH: 0
VOMITING: 0
CHILLS: 0
SHORTNESS OF BREATH: 0
MYALGIAS: 0
WHEEZING: 0
FEVER: 0
NECK PAIN: 0
DIZZINESS: 0
SORE THROAT: 0
CHILLS: 0
LOSS OF CONSCIOUSNESS: 0
WEAKNESS: 0
DIARRHEA: 0
PALPITATIONS: 0
BACK PAIN: 1
NERVOUS/ANXIOUS: 0
SHORTNESS OF BREATH: 0
WHEEZING: 0
VOMITING: 0
PALPITATIONS: 0
BRUISES/BLEEDS EASILY: 0
FALLS: 0
FALLS: 0
NEUROLOGICAL NEGATIVE: 1
WEAKNESS: 0
NAUSEA: 1
TINGLING: 0
SHORTNESS OF BREATH: 1
SHORTNESS OF BREATH: 0
ABDOMINAL PAIN: 0
FEVER: 0
NAUSEA: 0
HEADACHES: 0
NERVOUS/ANXIOUS: 0
FEVER: 0
HEADACHES: 0
DOUBLE VISION: 0
POLYDIPSIA: 0
FALLS: 0
NAUSEA: 0
PALPITATIONS: 0
ABDOMINAL PAIN: 0
DOUBLE VISION: 0
PALPITATIONS: 0
CHILLS: 0
SINUS PAIN: 0
WHEEZING: 0
EYE PAIN: 0
EYE REDNESS: 0
SPUTUM PRODUCTION: 0
HEADACHES: 0
SHORTNESS OF BREATH: 0
ABDOMINAL PAIN: 0
HEARTBURN: 0
BACK PAIN: 0
MYALGIAS: 0
VOMITING: 0
PSYCHIATRIC NEGATIVE: 1
MYALGIAS: 1
COUGH: 0
NERVOUS/ANXIOUS: 0
SHORTNESS OF BREATH: 0
INSOMNIA: 0
MUSCULOSKELETAL NEGATIVE: 1
BLURRED VISION: 0
CHILLS: 0
PALPITATIONS: 0
HEARTBURN: 0
FEVER: 1
VOMITING: 0
PALPITATIONS: 0
HEMOPTYSIS: 0
DOUBLE VISION: 0
COUGH: 0
NAUSEA: 0
FOCAL WEAKNESS: 0
WHEEZING: 0
SHORTNESS OF BREATH: 1
SHORTNESS OF BREATH: 0
DIZZINESS: 0
ORTHOPNEA: 0
DIARRHEA: 0
HEADACHES: 0
HALLUCINATIONS: 0
EYE PAIN: 0
PALPITATIONS: 0
CHEST TIGHTNESS: 0
FEVER: 1
ABDOMINAL PAIN: 0
ABDOMINAL PAIN: 0
NERVOUS/ANXIOUS: 0
HALLUCINATIONS: 0
NECK PAIN: 0
SHORTNESS OF BREATH: 0
CHILLS: 0
TINGLING: 1
FEVER: 0
HEMOPTYSIS: 0
EYE PAIN: 0
WEAKNESS: 1
NAUSEA: 0
NERVOUS/ANXIOUS: 0
CONSTIPATION: 0
DIZZINESS: 0
VOMITING: 0
HEADACHES: 0
DIARRHEA: 0
DIZZINESS: 0
BRUISES/BLEEDS EASILY: 0
PHOTOPHOBIA: 0
DIZZINESS: 0
NAUSEA: 0
NAUSEA: 0
MYALGIAS: 1
STRIDOR: 0
HEADACHES: 0
HEARTBURN: 0
PALPITATIONS: 0
NAUSEA: 0
COUGH: 1
WHEEZING: 0
CONSTIPATION: 0
SEIZURES: 0
ABDOMINAL PAIN: 0
FEVER: 0
VOMITING: 0
HEMOPTYSIS: 0
BACK PAIN: 0
EYES NEGATIVE: 1
BLURRED VISION: 0
VOMITING: 0

## 2023-01-01 ASSESSMENT — PATIENT HEALTH QUESTIONNAIRE - PHQ9
9. THOUGHTS THAT YOU WOULD BE BETTER OFF DEAD, OR OF HURTING YOURSELF: NOT AT ALL
SUM OF ALL RESPONSES TO PHQ9 QUESTIONS 1 AND 2: 0
SUM OF ALL RESPONSES TO PHQ9 QUESTIONS 1 AND 2: 2
8. MOVING OR SPEAKING SO SLOWLY THAT OTHER PEOPLE COULD HAVE NOTICED. OR THE OPPOSITE, BEING SO FIGETY OR RESTLESS THAT YOU HAVE BEEN MOVING AROUND A LOT MORE THAN USUAL: MORE THAN HALF THE DAYS
SUM OF ALL RESPONSES TO PHQ9 QUESTIONS 1 AND 2: 0
4. FEELING TIRED OR HAVING LITTLE ENERGY: SEVERAL DAYS
2. FEELING DOWN, DEPRESSED, IRRITABLE, OR HOPELESS: NOT AT ALL
6. FEELING BAD ABOUT YOURSELF - OR THAT YOU ARE A FAILURE OR HAVE LET YOURSELF OR YOUR FAMILY DOWN: NOT AL ALL
1. LITTLE INTEREST OR PLEASURE IN DOING THINGS: NOT AT ALL
5. POOR APPETITE OR OVEREATING: NOT AT ALL
SUM OF ALL RESPONSES TO PHQ9 QUESTIONS 1 AND 2: 0
1. LITTLE INTEREST OR PLEASURE IN DOING THINGS: NOT AT ALL
4. FEELING TIRED OR HAVING LITTLE ENERGY: MORE THAN HALF THE DAYS
SUM OF ALL RESPONSES TO PHQ9 QUESTIONS 1 AND 2: 0
1. LITTLE INTEREST OR PLEASURE IN DOING THINGS: NOT AT ALL
SUM OF ALL RESPONSES TO PHQ9 QUESTIONS 1 AND 2: 0
SUM OF ALL RESPONSES TO PHQ9 QUESTIONS 1 AND 2: 0
1. LITTLE INTEREST OR PLEASURE IN DOING THINGS: NOT AT ALL
2. FEELING DOWN, DEPRESSED, IRRITABLE, OR HOPELESS: SEVERAL DAYS
2. FEELING DOWN, DEPRESSED, IRRITABLE, OR HOPELESS: NOT AT ALL
1. LITTLE INTEREST OR PLEASURE IN DOING THINGS: NOT AT ALL
SUM OF ALL RESPONSES TO PHQ9 QUESTIONS 1 AND 2: 0
2. FEELING DOWN, DEPRESSED, IRRITABLE, OR HOPELESS: NOT AT ALL
2. FEELING DOWN, DEPRESSED, IRRITABLE, OR HOPELESS: NOT AT ALL
SUM OF ALL RESPONSES TO PHQ9 QUESTIONS 1 AND 2: 0
1. LITTLE INTEREST OR PLEASURE IN DOING THINGS: NOT AT ALL
1. LITTLE INTEREST OR PLEASURE IN DOING THINGS: NOT AT ALL
2. FEELING DOWN, DEPRESSED, IRRITABLE, OR HOPELESS: NOT AT ALL
2. FEELING DOWN, DEPRESSED, IRRITABLE, OR HOPELESS: NOT AT ALL
3. TROUBLE FALLING OR STAYING ASLEEP OR SLEEPING TOO MUCH: MORE THAN HALF THE DAYS
2. FEELING DOWN, DEPRESSED, IRRITABLE, OR HOPELESS: NOT AT ALL
SUM OF ALL RESPONSES TO PHQ9 QUESTIONS 1 AND 2: 0
1. LITTLE INTEREST OR PLEASURE IN DOING THINGS: NOT AT ALL
1. LITTLE INTEREST OR PLEASURE IN DOING THINGS: NOT AT ALL
2. FEELING DOWN, DEPRESSED, IRRITABLE, OR HOPELESS: NOT AT ALL
2. FEELING DOWN, DEPRESSED, IRRITABLE, OR HOPELESS: NOT AT ALL
1. LITTLE INTEREST OR PLEASURE IN DOING THINGS: NOT AT ALL
8. MOVING OR SPEAKING SO SLOWLY THAT OTHER PEOPLE COULD HAVE NOTICED. OR THE OPPOSITE, BEING SO FIGETY OR RESTLESS THAT YOU HAVE BEEN MOVING AROUND A LOT MORE THAN USUAL: NOT AT ALL
2. FEELING DOWN, DEPRESSED, IRRITABLE, OR HOPELESS: NEARLY EVERY DAY
1. LITTLE INTEREST OR PLEASURE IN DOING THINGS: NOT AT ALL
SUM OF ALL RESPONSES TO PHQ9 QUESTIONS 1 AND 2: 0
SUM OF ALL RESPONSES TO PHQ9 QUESTIONS 1 AND 2: 0
SUM OF ALL RESPONSES TO PHQ9 QUESTIONS 1 AND 2: 6
9. THOUGHTS THAT YOU WOULD BE BETTER OFF DEAD, OR OF HURTING YOURSELF: SEVERAL DAYS
2. FEELING DOWN, DEPRESSED, IRRITABLE, OR HOPELESS: NOT AT ALL
2. FEELING DOWN, DEPRESSED, IRRITABLE, OR HOPELESS: NOT AT ALL
5. POOR APPETITE OR OVEREATING: MORE THAN HALF THE DAYS
2. FEELING DOWN, DEPRESSED, IRRITABLE, OR HOPELESS: NOT AT ALL
5. POOR APPETITE OR OVEREATING: NOT AT ALL
SUM OF ALL RESPONSES TO PHQ QUESTIONS 1-9: 4
2. FEELING DOWN, DEPRESSED, IRRITABLE, OR HOPELESS: NOT AT ALL
SUM OF ALL RESPONSES TO PHQ9 QUESTIONS 1 AND 2: 0
SUM OF ALL RESPONSES TO PHQ9 QUESTIONS 1 AND 2: 0
1. LITTLE INTEREST OR PLEASURE IN DOING THINGS: NOT AT ALL
SUM OF ALL RESPONSES TO PHQ9 QUESTIONS 1 AND 2: 0
1. LITTLE INTEREST OR PLEASURE IN DOING THINGS: SEVERAL DAYS
SUM OF ALL RESPONSES TO PHQ9 QUESTIONS 1 AND 2: 0
1. LITTLE INTEREST OR PLEASURE IN DOING THINGS: SEVERAL DAYS
SUM OF ALL RESPONSES TO PHQ9 QUESTIONS 1 AND 2: 0
3. TROUBLE FALLING OR STAYING ASLEEP OR SLEEPING TOO MUCH: SEVERAL DAYS
2. FEELING DOWN, DEPRESSED, IRRITABLE, OR HOPELESS: NOT AT ALL
1. LITTLE INTEREST OR PLEASURE IN DOING THINGS: NOT AT ALL
6. FEELING BAD ABOUT YOURSELF - OR THAT YOU ARE A FAILURE OR HAVE LET YOURSELF OR YOUR FAMILY DOWN: NOT AL ALL
SUM OF ALL RESPONSES TO PHQ9 QUESTIONS 1 AND 2: 2
1. LITTLE INTEREST OR PLEASURE IN DOING THINGS: NOT AT ALL
SUM OF ALL RESPONSES TO PHQ9 QUESTIONS 1 AND 2: 0
3. TROUBLE FALLING OR STAYING ASLEEP OR SLEEPING TOO MUCH: SEVERAL DAYS
2. FEELING DOWN, DEPRESSED, IRRITABLE, OR HOPELESS: NOT AT ALL
1. LITTLE INTEREST OR PLEASURE IN DOING THINGS: NOT AT ALL
1. LITTLE INTEREST OR PLEASURE IN DOING THINGS: NOT AT ALL
2. FEELING DOWN, DEPRESSED, IRRITABLE, OR HOPELESS: NOT AT ALL
SUM OF ALL RESPONSES TO PHQ9 QUESTIONS 1 AND 2: 0
1. LITTLE INTEREST OR PLEASURE IN DOING THINGS: NOT AT ALL
2. FEELING DOWN, DEPRESSED, IRRITABLE, OR HOPELESS: NOT AT ALL
7. TROUBLE CONCENTRATING ON THINGS, SUCH AS READING THE NEWSPAPER OR WATCHING TELEVISION: NOT AT ALL
1. LITTLE INTEREST OR PLEASURE IN DOING THINGS: NOT AT ALL
2. FEELING DOWN, DEPRESSED, IRRITABLE, OR HOPELESS: NOT AT ALL
2. FEELING DOWN, DEPRESSED, IRRITABLE, OR HOPELESS: SEVERAL DAYS
SUM OF ALL RESPONSES TO PHQ9 QUESTIONS 1 AND 2: 0
7. TROUBLE CONCENTRATING ON THINGS, SUCH AS READING THE NEWSPAPER OR WATCHING TELEVISION: NOT AT ALL
1. LITTLE INTEREST OR PLEASURE IN DOING THINGS: NOT AT ALL
SUM OF ALL RESPONSES TO PHQ9 QUESTIONS 1 AND 2: 0
4. FEELING TIRED OR HAVING LITTLE ENERGY: SEVERAL DAYS
2. FEELING DOWN, DEPRESSED, IRRITABLE, OR HOPELESS: NOT AT ALL
8. MOVING OR SPEAKING SO SLOWLY THAT OTHER PEOPLE COULD HAVE NOTICED. OR THE OPPOSITE, BEING SO FIGETY OR RESTLESS THAT YOU HAVE BEEN MOVING AROUND A LOT MORE THAN USUAL: NOT AT ALL
2. FEELING DOWN, DEPRESSED, IRRITABLE, OR HOPELESS: NOT AT ALL
1. LITTLE INTEREST OR PLEASURE IN DOING THINGS: NEARLY EVERY DAY
SUM OF ALL RESPONSES TO PHQ9 QUESTIONS 1 AND 2: 0
SUM OF ALL RESPONSES TO PHQ9 QUESTIONS 1 AND 2: 0
2. FEELING DOWN, DEPRESSED, IRRITABLE, OR HOPELESS: NOT AT ALL
SUM OF ALL RESPONSES TO PHQ9 QUESTIONS 1 AND 2: 0
1. LITTLE INTEREST OR PLEASURE IN DOING THINGS: NOT AT ALL
2. FEELING DOWN, DEPRESSED, IRRITABLE, OR HOPELESS: NOT AT ALL
1. LITTLE INTEREST OR PLEASURE IN DOING THINGS: NOT AT ALL
6. FEELING BAD ABOUT YOURSELF - OR THAT YOU ARE A FAILURE OR HAVE LET YOURSELF OR YOUR FAMILY DOWN: MORE THAN HALF THE DAYS
2. FEELING DOWN, DEPRESSED, IRRITABLE, OR HOPELESS: NOT AT ALL
9. THOUGHTS THAT YOU WOULD BE BETTER OFF DEAD, OR OF HURTING YOURSELF: NOT AT ALL
SUM OF ALL RESPONSES TO PHQ QUESTIONS 1-9: 4

## 2023-01-01 ASSESSMENT — LIFESTYLE VARIABLES
ON A TYPICAL DAY WHEN YOU DRINK ALCOHOL HOW MANY DRINKS DO YOU HAVE: 0
ALCOHOL_USE: NO
HOW MANY TIMES IN THE PAST YEAR HAVE YOU HAD 5 OR MORE DRINKS IN A DAY: 0
AVERAGE NUMBER OF DAYS PER WEEK YOU HAVE A DRINK CONTAINING ALCOHOL: 0
TOTAL SCORE: 0
HAVE YOU EVER FELT YOU SHOULD CUT DOWN ON YOUR DRINKING: NO
CONSUMPTION TOTAL: INCOMPLETE
EVER HAD A DRINK FIRST THING IN THE MORNING TO STEADY YOUR NERVES TO GET RID OF A HANGOVER: NO
HAVE YOU EVER FELT YOU SHOULD CUT DOWN ON YOUR DRINKING: NO
HAVE PEOPLE ANNOYED YOU BY CRITICIZING YOUR DRINKING: NO
HOW MANY TIMES IN THE PAST YEAR HAVE YOU HAD 5 OR MORE DRINKS IN A DAY: 0
EVER_SMOKED: YES
HAVE YOU EVER FELT YOU SHOULD CUT DOWN ON YOUR DRINKING: NO
HOW MANY TIMES IN THE PAST YEAR HAVE YOU HAD 5 OR MORE DRINKS IN A DAY: 0
SUBSTANCE_ABUSE: 0
EVER HAD A DRINK FIRST THING IN THE MORNING TO STEADY YOUR NERVES TO GET RID OF A HANGOVER: NO
HOW MANY TIMES IN THE PAST YEAR HAVE YOU HAD 5 OR MORE DRINKS IN A DAY: 0
TOTAL SCORE: 0
SUBSTANCE_ABUSE: 0
EVER FELT BAD OR GUILTY ABOUT YOUR DRINKING: NO
TOTAL SCORE: 0
SUBSTANCE_ABUSE: 0
CONSUMPTION TOTAL: INCOMPLETE
TOTAL SCORE: 0
TOTAL SCORE: 0
HAVE PEOPLE ANNOYED YOU BY CRITICIZING YOUR DRINKING: NO
ON A TYPICAL DAY WHEN YOU DRINK ALCOHOL HOW MANY DRINKS DO YOU HAVE: 0
AVERAGE NUMBER OF DAYS PER WEEK YOU HAVE A DRINK CONTAINING ALCOHOL: 0
HAVE PEOPLE ANNOYED YOU BY CRITICIZING YOUR DRINKING: NO
EVER HAD A DRINK FIRST THING IN THE MORNING TO STEADY YOUR NERVES TO GET RID OF A HANGOVER: NO
CONSUMPTION TOTAL: NEGATIVE
TOTAL SCORE: 0
SUBSTANCE_ABUSE: 0
EVER HAD A DRINK FIRST THING IN THE MORNING TO STEADY YOUR NERVES TO GET RID OF A HANGOVER: NO
CONSUMPTION TOTAL: NEGATIVE
SUBSTANCE_ABUSE: 0
EVER HAD A DRINK FIRST THING IN THE MORNING TO STEADY YOUR NERVES TO GET RID OF A HANGOVER: NO
TOTAL SCORE: 0
EVER FELT BAD OR GUILTY ABOUT YOUR DRINKING: NO
ALCOHOL_USE: NO
CONSUMPTION TOTAL: NEGATIVE
SUBSTANCE_ABUSE: 0
ALCOHOL_USE: NO
DOES PATIENT WANT TO STOP DRINKING: NO
EVER HAD A DRINK FIRST THING IN THE MORNING TO STEADY YOUR NERVES TO GET RID OF A HANGOVER: NO
EVER_SMOKED: YES
SUBSTANCE_ABUSE: 0
TOTAL SCORE: 0
ON A TYPICAL DAY WHEN YOU DRINK ALCOHOL HOW MANY DRINKS DO YOU HAVE: 0
ON A TYPICAL DAY WHEN YOU DRINK ALCOHOL HOW MANY DRINKS DO YOU HAVE: 0
HAVE YOU EVER FELT YOU SHOULD CUT DOWN ON YOUR DRINKING: NO
CONSUMPTION TOTAL: NEGATIVE
ON A TYPICAL DAY WHEN YOU DRINK ALCOHOL HOW MANY DRINKS DO YOU HAVE: 0
ALCOHOL_USE: NO
DOES PATIENT WANT TO STOP DRINKING: NO
AVERAGE NUMBER OF DAYS PER WEEK YOU HAVE A DRINK CONTAINING ALCOHOL: 0
SUBSTANCE_ABUSE: 0
HAVE YOU EVER FELT YOU SHOULD CUT DOWN ON YOUR DRINKING: NO
HAVE YOU EVER FELT YOU SHOULD CUT DOWN ON YOUR DRINKING: NO
EVER FELT BAD OR GUILTY ABOUT YOUR DRINKING: NO
HAVE PEOPLE ANNOYED YOU BY CRITICIZING YOUR DRINKING: NO
TOTAL SCORE: 0
EVER FELT BAD OR GUILTY ABOUT YOUR DRINKING: NO
ON A TYPICAL DAY WHEN YOU DRINK ALCOHOL HOW MANY DRINKS DO YOU HAVE: 0
ALCOHOL_USE: NO
TOTAL SCORE: 0
AVERAGE NUMBER OF DAYS PER WEEK YOU HAVE A DRINK CONTAINING ALCOHOL: 0
SUBSTANCE_ABUSE: 0
EVER FELT BAD OR GUILTY ABOUT YOUR DRINKING: NO
HAVE PEOPLE ANNOYED YOU BY CRITICIZING YOUR DRINKING: NO
ALCOHOL_USE: NO
TOTAL SCORE: 0
HAVE PEOPLE ANNOYED YOU BY CRITICIZING YOUR DRINKING: NO
TOTAL SCORE: 0
EVER FELT BAD OR GUILTY ABOUT YOUR DRINKING: NO
HOW MANY TIMES IN THE PAST YEAR HAVE YOU HAD 5 OR MORE DRINKS IN A DAY: 0
AVERAGE NUMBER OF DAYS PER WEEK YOU HAVE A DRINK CONTAINING ALCOHOL: 0
SUBSTANCE_ABUSE: 0
HAVE PEOPLE ANNOYED YOU BY CRITICIZING YOUR DRINKING: NO
ALCOHOL_USE: NO
EVER_SMOKED: YES
AVERAGE NUMBER OF DAYS PER WEEK YOU HAVE A DRINK CONTAINING ALCOHOL: 0
CONSUMPTION TOTAL: NEGATIVE
HOW MANY TIMES IN THE PAST YEAR HAVE YOU HAD 5 OR MORE DRINKS IN A DAY: 0
HAVE YOU EVER FELT YOU SHOULD CUT DOWN ON YOUR DRINKING: NO
TOTAL SCORE: 0
TOTAL SCORE: 0
EVER FELT BAD OR GUILTY ABOUT YOUR DRINKING: NO

## 2023-01-01 ASSESSMENT — BRIEF INTERVIEW FOR MENTAL STATUS (BIMS)
WHAT MONTH IS IT: ACCURATE WITHIN 5 DAYS
BIMS SUMMARY SCORE: 12
ASKED TO RECALL BLUE: YES, NO CUE REQUIRED
WHAT MONTH IS IT: ACCURATE WITHIN 5 DAYS
BIMS SUMMARY SCORE: 15
ASKED TO RECALL BED: YES, NO CUE REQUIRED
WHAT YEAR IS IT: CORRECT
ASKED TO RECALL BED: YES, NO CUE REQUIRED
WHAT DAY OF THE WEEK IS IT: INCORRECT
ASKED TO RECALL SOCK: YES, NO CUE REQUIRED
WHAT DAY OF THE WEEK IS IT: CORRECT
INITIAL REPETITION OF BED BLUE SOCK - FIRST ATTEMPT: 3
WHAT DAY OF THE WEEK IS IT: CORRECT
BIMS SUMMARY SCORE: 15
ASKED TO RECALL BLUE: YES, NO CUE REQUIRED
BIMS SUMMARY SCORE: 14
INITIAL REPETITION OF BED BLUE SOCK - FIRST ATTEMPT: 3
ASKED TO RECALL BLUE: YES, NO CUE REQUIRED
WHAT DAY OF THE WEEK IS IT: CORRECT
WHAT DAY OF THE WEEK IS IT: CORRECT
ASKED TO RECALL BLUE: YES, NO CUE REQUIRED
INITIAL REPETITION OF BED BLUE SOCK - FIRST ATTEMPT: 3
ASKED TO RECALL SOCK: YES, NO CUE REQUIRED
WHAT MONTH IS IT: ACCURATE WITHIN 5 DAYS
INITIAL REPETITION OF BED BLUE SOCK - FIRST ATTEMPT: 3
ASKED TO RECALL BED: YES, NO CUE REQUIRED
WHAT MONTH IS IT: ACCURATE WITHIN 5 DAYS
INITIAL REPETITION OF BED BLUE SOCK - FIRST ATTEMPT: 3
ASKED TO RECALL BED: YES, NO CUE REQUIRED
ASKED TO RECALL SOCK: YES, NO CUE REQUIRED
ASKED TO RECALL BED: YES, NO CUE REQUIRED
ASKED TO RECALL BLUE: YES, NO CUE REQUIRED
INITIAL REPETITION OF BED BLUE SOCK - FIRST ATTEMPT: 3
WHAT MONTH IS IT: ACCURATE WITHIN 5 DAYS
ASKED TO RECALL SOCK: YES, NO CUE REQUIRED
ASKED TO RECALL BLUE: YES, NO CUE REQUIRED
ASKED TO RECALL SOCK: YES, NO CUE REQUIRED
WHAT DAY OF THE WEEK IS IT: CORRECT
ASKED TO RECALL SOCK: YES, NO CUE REQUIRED
WHAT MONTH IS IT: ACCURATE WITHIN 5 DAYS
BIMS SUMMARY SCORE: 15
ASKED TO RECALL BLUE: YES, NO CUE REQUIRED
WHAT DAY OF THE WEEK IS IT: CORRECT
ASKED TO RECALL BED: YES, NO CUE REQUIRED
BIMS SUMMARY SCORE: 15
WHAT YEAR IS IT: CORRECT
ASKED TO RECALL BED: YES, NO CUE REQUIRED
WHAT YEAR IS IT: MISSED BY MORE THAN 5 YEARS
WHAT MONTH IS IT: ACCURATE WITHIN 5 DAYS
ASKED TO RECALL SOCK: YES, NO CUE REQUIRED
WHAT YEAR IS IT: CORRECT
WHAT YEAR IS IT: CORRECT
INITIAL REPETITION OF BED BLUE SOCK - FIRST ATTEMPT: 3
BIMS SUMMARY SCORE: 15
WHAT YEAR IS IT: CORRECT
WHAT YEAR IS IT: CORRECT

## 2023-01-01 ASSESSMENT — ACTIVITIES OF DAILY LIVING (ADL)
TOILETING: INDEPENDENT
BED_CHAIR_WHEELCHAIR_TRANSFER_DESCRIPTION: ADAPTIVE EQUIPMENT;INCREASED TIME;INITIAL PREPARATION FOR TASK;SLIDEBOARD TRANSFER FROM BED TO WHEELCHAIR;SET-UP OF EQUIPMENT;SUPERVISION FOR SAFETY;VERBAL CUEING
BED_CHAIR_WHEELCHAIR_TRANSFER_DESCRIPTION: SUPERVISION FOR SAFETY;VERBAL CUEING;SET-UP OF EQUIPMENT
BED_CHAIR_WHEELCHAIR_TRANSFER_DESCRIPTION: INCREASED TIME;SQUAT PIVOT TRANSFER TO WHEELCHAIR;SUPERVISION FOR SAFETY
TOILET_TRANSFER_DESCRIPTION: INCREASED TIME
BED_CHAIR_WHEELCHAIR_TRANSFER_DESCRIPTION: SET-UP OF EQUIPMENT;SUPERVISION FOR SAFETY
BED_CHAIR_WHEELCHAIR_TRANSFER_DESCRIPTION: INCREASED TIME;SET-UP OF EQUIPMENT;SQUAT PIVOT TRANSFER TO WHEELCHAIR;SUPERVISION FOR SAFETY;VERBAL CUEING
BED_CHAIR_WHEELCHAIR_TRANSFER_DESCRIPTION: ADAPTIVE EQUIPMENT;INCREASED TIME;INITIAL PREPARATION FOR TASK;SUPERVISION FOR SAFETY;VERBAL CUEING
BED_CHAIR_WHEELCHAIR_TRANSFER_DESCRIPTION: INCREASED TIME
TUB_SHOWER_TRANSFER_DESCRIPTION: SHOWER BENCH;INCREASED TIME;SET-UP OF EQUIPMENT;SUPERVISION FOR SAFETY;VERBAL CUEING
BED_CHAIR_WHEELCHAIR_TRANSFER_DESCRIPTION: INCREASED TIME;SQUAT PIVOT TRANSFER TO WHEELCHAIR;SUPERVISION FOR SAFETY
BED_CHAIR_WHEELCHAIR_TRANSFER_DESCRIPTION: INCREASED TIME;SUPERVISION FOR SAFETY
TOILETING_LEVEL_OF_ASSIST: REQUIRES SUPERVISION WITH TOILETING
BED_CHAIR_WHEELCHAIR_TRANSFER_DESCRIPTION: INCREASED TIME;SLIDEBOARD TRANSFER FROM BED TO WHEELCHAIR;SET-UP OF EQUIPMENT;SUPERVISION FOR SAFETY;VERBAL CUEING
BED_CHAIR_WHEELCHAIR_TRANSFER_DESCRIPTION: INCREASED TIME;SUPERVISION FOR SAFETY
TOILET_TRANSFER_DESCRIPTION: ADAPTIVE EQUIPMENT;SUPERVISION FOR SAFETY
TOILET_TRANSFER_DESCRIPTION: ADAPTIVE EQUIPMENT;INCREASED TIME;SUPERVISION FOR SAFETY
BED_CHAIR_WHEELCHAIR_TRANSFER_DESCRIPTION: ADAPTIVE EQUIPMENT;INCREASED TIME;REQUIRES LIFT
TOILETING_LEVEL_OF_ASSIST: ABLE TO COMPLETE TOILETING WITHOUT ASSIST
TOILETING: INDEPENDENT
TUB_SHOWER_TRANSFER_DESCRIPTION: GRAB BAR;SHOWER BENCH;INCREASED TIME;SUPERVISION FOR SAFETY
BED_CHAIR_WHEELCHAIR_TRANSFER_DESCRIPTION: INCREASED TIME;SUPERVISION FOR SAFETY;VERBAL CUEING
TOILETING_LEVEL_OF_ASSIST: REQUIRES SUPERVISION WITH TOILETING
BED_CHAIR_WHEELCHAIR_TRANSFER_DESCRIPTION: INCREASED TIME;SUPERVISION FOR SAFETY;SQUAT PIVOT TRANSFER TO WHEELCHAIR
TUB_SHOWER_TRANSFER_DESCRIPTION: SHOWER BENCH;SUPERVISION FOR SAFETY
BED_CHAIR_WHEELCHAIR_TRANSFER_DESCRIPTION: INCREASED TIME;SUPERVISION FOR SAFETY
BED_CHAIR_WHEELCHAIR_TRANSFER_DESCRIPTION: ASSIST WITH TWO LIMBS
BED_CHAIR_WHEELCHAIR_TRANSFER_DESCRIPTION: ADAPTIVE EQUIPMENT;INCREASED TIME;SLIDEBOARD TRANSFER FROM BED TO WHEELCHAIR;SET-UP OF EQUIPMENT
BED_CHAIR_WHEELCHAIR_TRANSFER_DESCRIPTION: ADAPTIVE EQUIPMENT;INCREASED TIME;VERBAL CUEING
TOILET_TRANSFER_DESCRIPTION: ADAPTIVE EQUIPMENT;INCREASED TIME;SET-UP OF EQUIPMENT;SUPERVISION FOR SAFETY;VERBAL CUEING
BED_CHAIR_WHEELCHAIR_TRANSFER_DESCRIPTION: VERBAL CUEING;SUPERVISION FOR SAFETY
BED_CHAIR_WHEELCHAIR_TRANSFER_DESCRIPTION: INCREASED TIME;SQUAT PIVOT TRANSFER TO WHEELCHAIR;SUPERVISION FOR SAFETY;VERBAL CUEING
TOILET_TRANSFER_DESCRIPTION: ADAPTIVE EQUIPMENT;INCREASED TIME;SUPERVISION FOR SAFETY;VERBAL CUEING;SET-UP OF EQUIPMENT
TOILETING: INDEPENDENT
BED_CHAIR_WHEELCHAIR_TRANSFER_DESCRIPTION: INCREASED TIME;SUPERVISION FOR SAFETY;VERBAL CUEING
TOILET_TRANSFER_DESCRIPTION: ADAPTIVE EQUIPMENT;SET-UP OF EQUIPMENT;SUPERVISION FOR SAFETY;VERBAL CUEING;INCREASED TIME
BED_CHAIR_WHEELCHAIR_TRANSFER_DESCRIPTION: INCREASED TIME;SUPERVISION FOR SAFETY;INITIAL PREPARATION FOR TASK
BED_CHAIR_WHEELCHAIR_TRANSFER_DESCRIPTION: INCREASED TIME;INITIAL PREPARATION FOR TASK;SET-UP OF EQUIPMENT;SUPERVISION FOR SAFETY;VERBAL CUEING
TUB_SHOWER_TRANSFER_DESCRIPTION: GRAB BAR;SHOWER BENCH;SUPERVISION FOR SAFETY;VERBAL CUEING
TOILET_TRANSFER_DESCRIPTION: GRAB BAR;INCREASED TIME;INITIAL PREPARATION FOR TASK;SET-UP OF EQUIPMENT;SUPERVISION FOR SAFETY
SHOWER_TRANSFER_LEVEL_OF_ASSIST: ABLE TO COMPLETE SHOWER TRANSFER WITHOUT ASSIST
BED_CHAIR_WHEELCHAIR_TRANSFER_DESCRIPTION: INCREASED TIME;SUPERVISION FOR SAFETY;SQUAT PIVOT TRANSFER TO WHEELCHAIR
BED_CHAIR_WHEELCHAIR_TRANSFER_DESCRIPTION: SET-UP OF EQUIPMENT;VERBAL CUEING;SUPERVISION FOR SAFETY;INCREASED TIME
BED_CHAIR_WHEELCHAIR_TRANSFER_DESCRIPTION: INCREASED TIME;SUPERVISION FOR SAFETY
TUB_SHOWER_TRANSFER_DESCRIPTION: GRAB BAR;SHOWER BENCH;INCREASED TIME;SET-UP OF EQUIPMENT;SUPERVISION FOR SAFETY;VERBAL CUEING
BED_CHAIR_WHEELCHAIR_TRANSFER_DESCRIPTION: ADAPTIVE EQUIPMENT;INCREASED TIME;INITIAL PREPARATION FOR TASK;SUPERVISION FOR SAFETY
BED_CHAIR_WHEELCHAIR_TRANSFER_DESCRIPTION: INCREASED TIME;SQUAT PIVOT TRANSFER TO WHEELCHAIR;SUPERVISION FOR SAFETY;VERBAL CUEING
BED_CHAIR_WHEELCHAIR_TRANSFER_DESCRIPTION: VERBAL CUEING
TOILET_TRANSFER_DESCRIPTION: ADAPTIVE EQUIPMENT;SET-UP OF EQUIPMENT;INCREASED TIME;SUPERVISION FOR SAFETY;VERBAL CUEING
BED_CHAIR_WHEELCHAIR_TRANSFER_DESCRIPTION: ADAPTIVE EQUIPMENT;INCREASED TIME;INITIAL PREPARATION FOR TASK;SET-UP OF EQUIPMENT;SUPERVISION FOR SAFETY;VERBAL CUEING
TOILETING: INDEPENDENT
TUB_SHOWER_TRANSFER_DESCRIPTION: ADAPTIVE EQUIPMENT;SHOWER BENCH;INCREASED TIME;SET-UP OF EQUIPMENT;SUPERVISION FOR SAFETY;VERBAL CUEING
TOILET_TRANSFER_LEVEL_OF_ASSIST: REQUIRES SUPERVISION WITH TOILET TRANSFER
TOILET_TRANSFER_LEVEL_OF_ASSIST: ABLE TO COMPLETE TOILET TRANSFER WITHOUT ASSIST
TOILET_TRANSFER_DESCRIPTION: ADAPTIVE EQUIPMENT;INCREASED TIME;SET-UP OF EQUIPMENT;SUPERVISION FOR SAFETY;VERBAL CUEING
BED_CHAIR_WHEELCHAIR_TRANSFER_DESCRIPTION: INCREASED TIME;SQUAT PIVOT TRANSFER TO WHEELCHAIR;SUPERVISION FOR SAFETY;VERBAL CUEING
BED_CHAIR_WHEELCHAIR_TRANSFER_DESCRIPTION: INCREASED TIME;INITIAL PREPARATION FOR TASK;SLIDEBOARD TRANSFER FROM BED TO WHEELCHAIR;SUPERVISION FOR SAFETY;VERBAL CUEING
TOILET_TRANSFER_DESCRIPTION: ADAPTIVE EQUIPMENT;INCREASED TIME;SUPERVISION FOR SAFETY;VERBAL CUEING;SET-UP OF EQUIPMENT
BED_CHAIR_WHEELCHAIR_TRANSFER_DESCRIPTION: INCREASED TIME;INITIAL PREPARATION FOR TASK;SET-UP OF EQUIPMENT;SUPERVISION FOR SAFETY;VERBAL CUEING
TUB_SHOWER_TRANSFER_DESCRIPTION: SHOWER BENCH;SUPERVISION FOR SAFETY
TOILETING: INDEPENDENT
BED_CHAIR_WHEELCHAIR_TRANSFER_DESCRIPTION: INCREASED TIME;SQUAT PIVOT TRANSFER TO WHEELCHAIR;SUPERVISION FOR SAFETY;VERBAL CUEING
TOILET_TRANSFER_DESCRIPTION: GRAB BAR;INCREASED TIME;SET-UP OF EQUIPMENT;SUPERVISION FOR SAFETY;VERBAL CUEING
TOILET_TRANSFER_DESCRIPTION: INCREASED TIME;ADAPTIVE EQUIPMENT;SUPERVISION FOR SAFETY
BED_CHAIR_WHEELCHAIR_TRANSFER_DESCRIPTION: INCREASED TIME;SQUAT PIVOT TRANSFER TO WHEELCHAIR;SUPERVISION FOR SAFETY;VERBAL CUEING
BED_CHAIR_WHEELCHAIR_TRANSFER_DESCRIPTION: ADAPTIVE EQUIPMENT;INCREASED TIME;SLIDEBOARD TRANSFER FROM BED TO WHEELCHAIR;SET-UP OF EQUIPMENT
BED_CHAIR_WHEELCHAIR_TRANSFER_DESCRIPTION: INCREASED TIME;SUPERVISION FOR SAFETY;VERBAL CUEING;INITIAL PREPARATION FOR TASK
BED_CHAIR_WHEELCHAIR_TRANSFER_DESCRIPTION: ASSIST WITH TWO LIMBS
TOILETING_LEVEL_OF_ASSIST_DESCRIPTION: INCREASED TIME
TUB_SHOWER_TRANSFER_DESCRIPTION: GRAB BAR;SHOWER BENCH;INCREASED TIME;INITIAL PREPARATION FOR TASK;SET-UP OF EQUIPMENT;SUPERVISION FOR SAFETY
SHOWER_TRANSFER_LEVEL_OF_ASSIST: REQUIRES SUPERVISION WITH SHOWER TRANSFER
BED_CHAIR_WHEELCHAIR_TRANSFER_DESCRIPTION: INCREASED TIME;SET-UP OF EQUIPMENT;SQUAT PIVOT TRANSFER TO WHEELCHAIR;SUPERVISION FOR SAFETY
BED_CHAIR_WHEELCHAIR_TRANSFER_DESCRIPTION: SUPERVISION FOR SAFETY
TOILET_TRANSFER_DESCRIPTION: ADAPTIVE EQUIPMENT;INCREASED TIME;SET-UP OF EQUIPMENT;SUPERVISION FOR SAFETY;VERBAL CUEING
BED_CHAIR_WHEELCHAIR_TRANSFER_DESCRIPTION: ADAPTIVE EQUIPMENT;INCREASED TIME;REQUIRES LIFT
TOILETING_LEVEL_OF_ASSIST_DESCRIPTION: GRAB BAR;SET-UP OF EQUIPMENT;SUPERVISION FOR SAFETY;VERBAL CUEING
TOILETING: INDEPENDENT
TOILET_TRANSFER_DESCRIPTION: ADAPTIVE EQUIPMENT;SET-UP OF EQUIPMENT;SUPERVISION FOR SAFETY;VERBAL CUEING
TUB_SHOWER_TRANSFER_DESCRIPTION: INCREASED TIME
BED_CHAIR_WHEELCHAIR_TRANSFER_DESCRIPTION: INCREASED TIME;SUPERVISION FOR SAFETY
TOILETING_LEVEL_OF_ASSIST_DESCRIPTION: GRAB BAR;INCREASED TIME;INITIAL PREPARATION FOR TASK;SET-UP OF EQUIPMENT;SUPERVISION FOR SAFETY
BED_CHAIR_WHEELCHAIR_TRANSFER_DESCRIPTION: OTHER (COMMENT);VERBAL CUEING
TOILET_TRANSFER_DESCRIPTION: ADAPTIVE EQUIPMENT;INCREASED TIME;SET-UP OF EQUIPMENT;SUPERVISION FOR SAFETY;VERBAL CUEING
TOILET_TRANSFER_LEVEL_OF_ASSIST: REQUIRES SUPERVISION WITH TOILET TRANSFER
BED_CHAIR_WHEELCHAIR_TRANSFER_DESCRIPTION: ADAPTIVE EQUIPMENT;INCREASED TIME;NON-HOSPITAL BED;SET-UP OF EQUIPMENT;SUPERVISION FOR SAFETY;VERBAL CUEING
BED_CHAIR_WHEELCHAIR_TRANSFER_DESCRIPTION: INCREASED TIME;SUPERVISION FOR SAFETY
BED_CHAIR_WHEELCHAIR_TRANSFER_DESCRIPTION: ADAPTIVE EQUIPMENT;INITIAL PREPARATION FOR TASK;INCREASED TIME;SUPERVISION FOR SAFETY;VERBAL CUEING
BED_CHAIR_WHEELCHAIR_TRANSFER_DESCRIPTION: INCREASED TIME;SUPERVISION FOR SAFETY
BED_CHAIR_WHEELCHAIR_TRANSFER_DESCRIPTION: ADAPTIVE EQUIPMENT;INCREASED TIME;SET-UP OF EQUIPMENT;SUPERVISION FOR SAFETY;VERBAL CUEING
BED_CHAIR_WHEELCHAIR_TRANSFER_DESCRIPTION: INCREASED TIME;INITIAL PREPARATION FOR TASK;SLIDEBOARD TRANSFER FROM BED TO WHEELCHAIR;SUPERVISION FOR SAFETY;VERBAL CUEING
BED_CHAIR_WHEELCHAIR_TRANSFER_DESCRIPTION: INCREASED TIME
BED_CHAIR_WHEELCHAIR_TRANSFER_DESCRIPTION: INCREASED TIME;SET-UP OF EQUIPMENT;SUPERVISION FOR SAFETY
TOILETING: INDEPENDENT
BED_CHAIR_WHEELCHAIR_TRANSFER_DESCRIPTION: SUPERVISION FOR SAFETY;SET-UP OF EQUIPMENT
SHOWER_TRANSFER_LEVEL_OF_ASSIST: REQUIRES SUPERVISION WITH SHOWER TRANSFER

## 2023-01-01 ASSESSMENT — PAIN DESCRIPTION - PAIN TYPE
TYPE: ACUTE PAIN
TYPE: ACUTE PAIN;CHRONIC PAIN
TYPE: CHRONIC PAIN
TYPE: ACUTE PAIN
TYPE: SURGICAL PAIN
TYPE: CHRONIC PAIN;ACUTE PAIN
TYPE: SURGICAL PAIN
TYPE: ACUTE PAIN
TYPE: ACUTE PAIN;SURGICAL PAIN
TYPE: ACUTE PAIN;CHRONIC PAIN
TYPE: ACUTE PAIN
TYPE: SURGICAL PAIN
TYPE: SURGICAL PAIN
TYPE: ACUTE PAIN
TYPE: SURGICAL PAIN
TYPE: CHRONIC PAIN
TYPE: ACUTE PAIN
TYPE: ACUTE PAIN
TYPE: CHRONIC PAIN
TYPE: ACUTE PAIN
TYPE: SURGICAL PAIN
TYPE: SURGICAL PAIN
TYPE: ACUTE PAIN;SURGICAL PAIN
TYPE: ACUTE PAIN
TYPE: SURGICAL PAIN
TYPE: ACUTE PAIN;SURGICAL PAIN
TYPE: ACUTE PAIN;SURGICAL PAIN
TYPE: ACUTE PAIN
TYPE: SURGICAL PAIN
TYPE: ACUTE PAIN
TYPE: ACUTE PAIN;SURGICAL PAIN
TYPE: ACUTE PAIN;SURGICAL PAIN
TYPE: ACUTE PAIN
TYPE: ACUTE PAIN;SURGICAL PAIN
TYPE: ACUTE PAIN
TYPE: ACUTE PAIN
TYPE: SURGICAL PAIN
TYPE: ACUTE PAIN
TYPE: SURGICAL PAIN
TYPE: SURGICAL PAIN
TYPE: CHRONIC PAIN;ACUTE PAIN
TYPE: CHRONIC PAIN
TYPE: ACUTE PAIN
TYPE: SURGICAL PAIN
TYPE: ACUTE PAIN;SURGICAL PAIN
TYPE: SURGICAL PAIN;CHRONIC PAIN
TYPE: ACUTE PAIN
TYPE: ACUTE PAIN
TYPE: CHRONIC PAIN
TYPE: ACUTE PAIN
TYPE: ACUTE PAIN
TYPE: CHRONIC PAIN
TYPE: ACUTE PAIN
TYPE: CHRONIC PAIN
TYPE: SURGICAL PAIN
TYPE: CHRONIC PAIN
TYPE: ACUTE PAIN
TYPE: CHRONIC PAIN
TYPE: SURGICAL PAIN;CHRONIC PAIN
TYPE: CHRONIC PAIN;SURGICAL PAIN
TYPE: ACUTE PAIN;SURGICAL PAIN
TYPE: ACUTE PAIN
TYPE: ACUTE PAIN
TYPE: CHRONIC PAIN
TYPE: ACUTE PAIN
TYPE: SURGICAL PAIN
TYPE: ACUTE PAIN
TYPE: ACUTE PAIN
TYPE: ACUTE PAIN;CHRONIC PAIN
TYPE: CHRONIC PAIN
TYPE: ACUTE PAIN
TYPE: ACUTE PAIN
TYPE: ACUTE PAIN;SURGICAL PAIN
TYPE: ACUTE PAIN;CHRONIC PAIN;SURGICAL PAIN
TYPE: ACUTE PAIN;SURGICAL PAIN
TYPE: ACUTE PAIN
TYPE: SURGICAL PAIN
TYPE: ACUTE PAIN
TYPE: CHRONIC PAIN
TYPE: ACUTE PAIN
TYPE: ACUTE PAIN
TYPE: SURGICAL PAIN
TYPE: SURGICAL PAIN
TYPE: ACUTE PAIN
TYPE: ACUTE PAIN
TYPE: SURGICAL PAIN
TYPE: CHRONIC PAIN
TYPE: ACUTE PAIN
TYPE: ACUTE PAIN;CHRONIC PAIN
TYPE: ACUTE PAIN
TYPE: ACUTE PAIN;SURGICAL PAIN
TYPE: ACUTE PAIN;SURGICAL PAIN
TYPE: ACUTE PAIN
TYPE: ACUTE PAIN
TYPE: ACUTE PAIN;SURGICAL PAIN
TYPE: ACUTE PAIN
TYPE: ACUTE PAIN;SURGICAL PAIN
TYPE: CHRONIC PAIN
TYPE: ACUTE PAIN;SURGICAL PAIN
TYPE: ACUTE PAIN;SURGICAL PAIN
TYPE: ACUTE PAIN
TYPE: ACUTE PAIN;CHRONIC PAIN
TYPE: CHRONIC PAIN
TYPE: CHRONIC PAIN
TYPE: SURGICAL PAIN
TYPE: ACUTE PAIN
TYPE: CHRONIC PAIN
TYPE: ACUTE PAIN
TYPE: ACUTE PAIN;SURGICAL PAIN
TYPE: ACUTE PAIN
TYPE: ACUTE PAIN;SURGICAL PAIN
TYPE: ACUTE PAIN
TYPE: ACUTE PAIN
TYPE: SURGICAL PAIN
TYPE: ACUTE PAIN
TYPE: ACUTE PAIN;SURGICAL PAIN
TYPE: ACUTE PAIN
TYPE: ACUTE PAIN;CHRONIC PAIN
TYPE: CHRONIC PAIN
TYPE: ACUTE PAIN
TYPE: ACUTE PAIN;SURGICAL PAIN
TYPE: ACUTE PAIN
TYPE: ACUTE PAIN;SURGICAL PAIN
TYPE: ACUTE PAIN;CHRONIC PAIN
TYPE: SURGICAL PAIN
TYPE: ACUTE PAIN
TYPE: CHRONIC PAIN;SURGICAL PAIN
TYPE: SURGICAL PAIN
TYPE: CHRONIC PAIN
TYPE: CHRONIC PAIN;SURGICAL PAIN
TYPE: ACUTE PAIN
TYPE: ACUTE PAIN;SURGICAL PAIN
TYPE: ACUTE PAIN
TYPE: ACUTE PAIN;SURGICAL PAIN
TYPE: SURGICAL PAIN
TYPE: SURGICAL PAIN
TYPE: ACUTE PAIN
TYPE: SURGICAL PAIN
TYPE: CHRONIC PAIN
TYPE: CHRONIC PAIN
TYPE: ACUTE PAIN
TYPE: SURGICAL PAIN
TYPE: ACUTE PAIN
TYPE: SURGICAL PAIN;ACUTE PAIN
TYPE: ACUTE PAIN;SURGICAL PAIN
TYPE: ACUTE PAIN
TYPE: ACUTE PAIN;SURGICAL PAIN
TYPE: ACUTE PAIN
TYPE: CHRONIC PAIN
TYPE: SURGICAL PAIN
TYPE: CHRONIC PAIN
TYPE: ACUTE PAIN
TYPE: ACUTE PAIN
TYPE: ACUTE PAIN;SURGICAL PAIN
TYPE: ACUTE PAIN
TYPE: ACUTE PAIN
TYPE: SURGICAL PAIN
TYPE: ACUTE PAIN;SURGICAL PAIN
TYPE: ACUTE PAIN
TYPE: ACUTE PAIN;SURGICAL PAIN
TYPE: ACUTE PAIN
TYPE: ACUTE PAIN
TYPE: CHRONIC PAIN
TYPE: ACUTE PAIN;SURGICAL PAIN
TYPE: ACUTE PAIN
TYPE: SURGICAL PAIN
TYPE: SURGICAL PAIN
TYPE: CHRONIC PAIN
TYPE: ACUTE PAIN
TYPE: ACUTE PAIN
TYPE: SURGICAL PAIN
TYPE: ACUTE PAIN
TYPE: CHRONIC PAIN
TYPE: ACUTE PAIN;SURGICAL PAIN
TYPE: ACUTE PAIN;SURGICAL PAIN
TYPE: CHRONIC PAIN
TYPE: CHRONIC PAIN
TYPE: SURGICAL PAIN
TYPE: CHRONIC PAIN
TYPE: ACUTE PAIN
TYPE: CHRONIC PAIN
TYPE: ACUTE PAIN
TYPE: ACUTE PAIN
TYPE: CHRONIC PAIN
TYPE: ACUTE PAIN
TYPE: ACUTE PAIN
TYPE: ACUTE PAIN;CHRONIC PAIN
TYPE: ACUTE PAIN;SURGICAL PAIN
TYPE: ACUTE PAIN
TYPE: SURGICAL PAIN
TYPE: SURGICAL PAIN
TYPE: ACUTE PAIN
TYPE: SURGICAL PAIN
TYPE: ACUTE PAIN
TYPE: ACUTE PAIN;SURGICAL PAIN
TYPE: ACUTE PAIN
TYPE: ACUTE PAIN
TYPE: ACUTE PAIN;SURGICAL PAIN
TYPE: ACUTE PAIN
TYPE: ACUTE PAIN;CHRONIC PAIN
TYPE: ACUTE PAIN;SURGICAL PAIN
TYPE: ACUTE PAIN;SURGICAL PAIN
TYPE: ACUTE PAIN;CHRONIC PAIN
TYPE: SURGICAL PAIN
TYPE: ACUTE PAIN
TYPE: SURGICAL PAIN
TYPE: ACUTE PAIN;CHRONIC PAIN
TYPE: SURGICAL PAIN
TYPE: ACUTE PAIN
TYPE: SURGICAL PAIN
TYPE: SURGICAL PAIN
TYPE: ACUTE PAIN
TYPE: ACUTE PAIN
TYPE: SURGICAL PAIN
TYPE: ACUTE PAIN;SURGICAL PAIN
TYPE: ACUTE PAIN
TYPE: ACUTE PAIN;CHRONIC PAIN
TYPE: ACUTE PAIN
TYPE: ACUTE PAIN
TYPE: CHRONIC PAIN
TYPE: CHRONIC PAIN
TYPE: ACUTE PAIN
TYPE: SURGICAL PAIN
TYPE: ACUTE PAIN
TYPE: SURGICAL PAIN
TYPE: ACUTE PAIN
TYPE: ACUTE PAIN
TYPE: SURGICAL PAIN
TYPE: ACUTE PAIN
TYPE: SURGICAL PAIN
TYPE: ACUTE PAIN;SURGICAL PAIN
TYPE: ACUTE PAIN;SURGICAL PAIN
TYPE: CHRONIC PAIN
TYPE: ACUTE PAIN;SURGICAL PAIN
TYPE: SURGICAL PAIN
TYPE: CHRONIC PAIN
TYPE: SURGICAL PAIN
TYPE: ACUTE PAIN
TYPE: ACUTE PAIN;CHRONIC PAIN
TYPE: ACUTE PAIN
TYPE: CHRONIC PAIN
TYPE: ACUTE PAIN;SURGICAL PAIN
TYPE: SURGICAL PAIN
TYPE: ACUTE PAIN
TYPE: ACUTE PAIN;SURGICAL PAIN
TYPE: ACUTE PAIN;SURGICAL PAIN
TYPE: CHRONIC PAIN
TYPE: SURGICAL PAIN
TYPE: ACUTE PAIN
TYPE: SURGICAL PAIN
TYPE: ACUTE PAIN
TYPE: ACUTE PAIN
TYPE: ACUTE PAIN;SURGICAL PAIN
TYPE: ACUTE PAIN
TYPE: SURGICAL PAIN
TYPE: ACUTE PAIN;SURGICAL PAIN
TYPE: ACUTE PAIN
TYPE: ACUTE PAIN;SURGICAL PAIN
TYPE: ACUTE PAIN
TYPE: CHRONIC PAIN;SURGICAL PAIN
TYPE: SURGICAL PAIN
TYPE: ACUTE PAIN
TYPE: SURGICAL PAIN
TYPE: ACUTE PAIN;SURGICAL PAIN
TYPE: SURGICAL PAIN
TYPE: ACUTE PAIN
TYPE: SURGICAL PAIN
TYPE: ACUTE PAIN
TYPE: ACUTE PAIN
TYPE: SURGICAL PAIN
TYPE: ACUTE PAIN;SURGICAL PAIN
TYPE: SURGICAL PAIN
TYPE: ACUTE PAIN
TYPE: CHRONIC PAIN
TYPE: ACUTE PAIN
TYPE: ACUTE PAIN
TYPE: SURGICAL PAIN
TYPE: ACUTE PAIN
TYPE: ACUTE PAIN;SURGICAL PAIN
TYPE: ACUTE PAIN
TYPE: ACUTE PAIN
TYPE: SURGICAL PAIN
TYPE: SURGICAL PAIN
TYPE: ACUTE PAIN
TYPE: SURGICAL PAIN
TYPE: ACUTE PAIN
TYPE: ACUTE PAIN;CHRONIC PAIN
TYPE: SURGICAL PAIN
TYPE: SURGICAL PAIN;NEUROPATHIC PAIN
TYPE: SURGICAL PAIN
TYPE: ACUTE PAIN
TYPE: ACUTE PAIN
TYPE: CHRONIC PAIN
TYPE: ACUTE PAIN
TYPE: ACUTE PAIN;SURGICAL PAIN
TYPE: SURGICAL PAIN;ACUTE PAIN
TYPE: ACUTE PAIN
TYPE: ACUTE PAIN;CHRONIC PAIN
TYPE: ACUTE PAIN;SURGICAL PAIN
TYPE: CHRONIC PAIN;ACUTE PAIN
TYPE: ACUTE PAIN;SURGICAL PAIN
TYPE: CHRONIC PAIN
TYPE: ACUTE PAIN
TYPE: ACUTE PAIN;CHRONIC PAIN
TYPE: ACUTE PAIN;SURGICAL PAIN
TYPE: ACUTE PAIN
TYPE: ACUTE PAIN
TYPE: CHRONIC PAIN
TYPE: ACUTE PAIN
TYPE: SURGICAL PAIN;CHRONIC PAIN
TYPE: ACUTE PAIN
TYPE: ACUTE PAIN
TYPE: SURGICAL PAIN
TYPE: ACUTE PAIN
TYPE: ACUTE PAIN;SURGICAL PAIN
TYPE: SURGICAL PAIN
TYPE: ACUTE PAIN
TYPE: ACUTE PAIN
TYPE: SURGICAL PAIN
TYPE: ACUTE PAIN
TYPE: ACUTE PAIN
TYPE: SURGICAL PAIN
TYPE: SURGICAL PAIN
TYPE: ACUTE PAIN
TYPE: ACUTE PAIN;SURGICAL PAIN
TYPE: ACUTE PAIN
TYPE: ACUTE PAIN
TYPE: CHRONIC PAIN
TYPE: ACUTE PAIN;SURGICAL PAIN
TYPE: CHRONIC PAIN
TYPE: CHRONIC PAIN
TYPE: SURGICAL PAIN
TYPE: ACUTE PAIN
TYPE: SURGICAL PAIN
TYPE: SURGICAL PAIN
TYPE: ACUTE PAIN
TYPE: SURGICAL PAIN
TYPE: ACUTE PAIN;SURGICAL PAIN
TYPE: ACUTE PAIN
TYPE: ACUTE PAIN;SURGICAL PAIN
TYPE: ACUTE PAIN
TYPE: ACUTE PAIN
TYPE: CHRONIC PAIN
TYPE: ACUTE PAIN
TYPE: SURGICAL PAIN
TYPE: CHRONIC PAIN
TYPE: CHRONIC PAIN
TYPE: ACUTE PAIN;SURGICAL PAIN
TYPE: ACUTE PAIN;SURGICAL PAIN
TYPE: SURGICAL PAIN
TYPE: ACUTE PAIN
TYPE: SURGICAL PAIN
TYPE: SURGICAL PAIN
TYPE: ACUTE PAIN
TYPE: SURGICAL PAIN;NEUROPATHIC PAIN
TYPE: ACUTE PAIN
TYPE: SURGICAL PAIN
TYPE: ACUTE PAIN
TYPE: SURGICAL PAIN
TYPE: ACUTE PAIN
TYPE: ACUTE PAIN;SURGICAL PAIN
TYPE: ACUTE PAIN
TYPE: CHRONIC PAIN
TYPE: CHRONIC PAIN
TYPE: SURGICAL PAIN
TYPE: CHRONIC PAIN
TYPE: CHRONIC PAIN
TYPE: ACUTE PAIN
TYPE: ACUTE PAIN
TYPE: SURGICAL PAIN;ACUTE PAIN
TYPE: ACUTE PAIN;SURGICAL PAIN
TYPE: ACUTE PAIN
TYPE: SURGICAL PAIN
TYPE: ACUTE PAIN
TYPE: ACUTE PAIN;SURGICAL PAIN
TYPE: SURGICAL PAIN
TYPE: ACUTE PAIN
TYPE: CHRONIC PAIN
TYPE: ACUTE PAIN
TYPE: ACUTE PAIN;SURGICAL PAIN
TYPE: ACUTE PAIN
TYPE: SURGICAL PAIN
TYPE: CHRONIC PAIN
TYPE: ACUTE PAIN;SURGICAL PAIN
TYPE: ACUTE PAIN
TYPE: ACUTE PAIN
TYPE: ACUTE PAIN;CHRONIC PAIN
TYPE: ACUTE PAIN
TYPE: SURGICAL PAIN;NEUROPATHIC PAIN
TYPE: ACUTE PAIN
TYPE: ACUTE PAIN
TYPE: SURGICAL PAIN
TYPE: ACUTE PAIN
TYPE: ACUTE PAIN
TYPE: ACUTE PAIN;CHRONIC PAIN
TYPE: ACUTE PAIN
TYPE: ACUTE PAIN;SURGICAL PAIN
TYPE: ACUTE PAIN
TYPE: ACUTE PAIN;SURGICAL PAIN
TYPE: ACUTE PAIN
TYPE: SURGICAL PAIN
TYPE: ACUTE PAIN
TYPE: ACUTE PAIN
TYPE: CHRONIC PAIN
TYPE: ACUTE PAIN
TYPE: ACUTE PAIN
TYPE: ACUTE PAIN;SURGICAL PAIN
TYPE: ACUTE PAIN
TYPE: ACUTE PAIN
TYPE: ACUTE PAIN;SURGICAL PAIN
TYPE: SURGICAL PAIN
TYPE: CHRONIC PAIN
TYPE: SURGICAL PAIN
TYPE: ACUTE PAIN
TYPE: ACUTE PAIN
TYPE: ACUTE PAIN;SURGICAL PAIN
TYPE: ACUTE PAIN
TYPE: SURGICAL PAIN
TYPE: ACUTE PAIN
TYPE: ACUTE PAIN;SURGICAL PAIN
TYPE: CHRONIC PAIN
TYPE: SURGICAL PAIN
TYPE: ACUTE PAIN
TYPE: SURGICAL PAIN
TYPE: ACUTE PAIN;SURGICAL PAIN
TYPE: ACUTE PAIN
TYPE: ACUTE PAIN
TYPE: ACUTE PAIN;SURGICAL PAIN
TYPE: ACUTE PAIN
TYPE: CHRONIC PAIN
TYPE: SURGICAL PAIN
TYPE: ACUTE PAIN
TYPE: SURGICAL PAIN
TYPE: ACUTE PAIN
TYPE: CHRONIC PAIN
TYPE: ACUTE PAIN
TYPE: CHRONIC PAIN
TYPE: ACUTE PAIN;CHRONIC PAIN
TYPE: ACUTE PAIN
TYPE: ACUTE PAIN;SURGICAL PAIN
TYPE: CHRONIC PAIN
TYPE: ACUTE PAIN
TYPE: SURGICAL PAIN
TYPE: ACUTE PAIN
TYPE: SURGICAL PAIN
TYPE: SURGICAL PAIN
TYPE: ACUTE PAIN
TYPE: SURGICAL PAIN
TYPE: SURGICAL PAIN
TYPE: ACUTE PAIN
TYPE: ACUTE PAIN;CHRONIC PAIN
TYPE: SURGICAL PAIN
TYPE: ACUTE PAIN
TYPE: ACUTE PAIN
TYPE: CHRONIC PAIN
TYPE: CHRONIC PAIN
TYPE: ACUTE PAIN;SURGICAL PAIN
TYPE: CHRONIC PAIN
TYPE: ACUTE PAIN
TYPE: SURGICAL PAIN
TYPE: ACUTE PAIN
TYPE: ACUTE PAIN;SURGICAL PAIN
TYPE: ACUTE PAIN
TYPE: CHRONIC PAIN
TYPE: CHRONIC PAIN
TYPE: SURGICAL PAIN
TYPE: SURGICAL PAIN;NEUROPATHIC PAIN
TYPE: ACUTE PAIN
TYPE: ACUTE PAIN;CHRONIC PAIN
TYPE: ACUTE PAIN
TYPE: SURGICAL PAIN
TYPE: ACUTE PAIN
TYPE: CHRONIC PAIN
TYPE: ACUTE PAIN
TYPE: CHRONIC PAIN
TYPE: ACUTE PAIN;SURGICAL PAIN
TYPE: ACUTE PAIN
TYPE: ACUTE PAIN
TYPE: SURGICAL PAIN
TYPE: ACUTE PAIN;SURGICAL PAIN
TYPE: ACUTE PAIN
TYPE: CHRONIC PAIN
TYPE: ACUTE PAIN
TYPE: CHRONIC PAIN
TYPE: ACUTE PAIN
TYPE: ACUTE PAIN
TYPE: ACUTE PAIN;CHRONIC PAIN
TYPE: SURGICAL PAIN
TYPE: ACUTE PAIN;CHRONIC PAIN
TYPE: ACUTE PAIN
TYPE: ACUTE PAIN;CHRONIC PAIN

## 2023-01-01 ASSESSMENT — PAIN SCALES - WONG BAKER
WONGBAKER_NUMERICALRESPONSE: DOESN'T HURT AT ALL
WONGBAKER_NUMERICALRESPONSE: DOESN'T HURT AT ALL
WONGBAKER_NUMERICALRESPONSE: HURTS JUST A LITTLE BIT
WONGBAKER_NUMERICALRESPONSE: DOESN'T HURT AT ALL
WONGBAKER_NUMERICALRESPONSE: HURTS JUST A LITTLE BIT
WONGBAKER_NUMERICALRESPONSE: DOESN'T HURT AT ALL

## 2023-01-01 ASSESSMENT — GAIT ASSESSMENTS
DEVIATION: BRADYKINETIC;DECREASED BASE OF SUPPORT
ASSISTIVE DEVICE: FRONT WHEEL WALKER
GAIT LEVEL OF ASSIST: UNABLE TO PARTICIPATE
DEVIATION: ANTALGIC
GAIT LEVEL OF ASSIST: UNABLE TO PARTICIPATE
ASSISTIVE DEVICE: FRONT WHEEL WALKER
ASSISTIVE DEVICE: FRONT WHEEL WALKER
DEVIATION: ANTALGIC;BRADYKINETIC
GAIT LEVEL OF ASSIST: UNABLE TO PARTICIPATE
GAIT LEVEL OF ASSIST: UNABLE TO PARTICIPATE
ASSISTIVE DEVICE: FRONT WHEEL WALKER
GAIT LEVEL OF ASSIST: CONTACT GUARD ASSIST
DISTANCE (FEET): 400
GAIT LEVEL OF ASSIST: CONTACT GUARD ASSIST
DEVIATION: ANTALGIC;BRADYKINETIC
GAIT LEVEL OF ASSIST: CONTACT GUARD ASSIST
DEVIATION: ANTALGIC;BRADYKINETIC
GAIT LEVEL OF ASSIST: UNABLE TO PARTICIPATE
DISTANCE (FEET): 10
DEVIATION: ANTALGIC;BRADYKINETIC
ASSISTIVE DEVICE: PARALLEL BARS
ASSISTIVE DEVICE: FRONT WHEEL WALKER
GAIT LEVEL OF ASSIST: CONTACT GUARD ASSIST
DEVIATION: BRADYKINETIC
GAIT LEVEL OF ASSIST: UNABLE TO PARTICIPATE
DISTANCE (FEET): 40
GAIT LEVEL OF ASSIST: UNABLE TO PARTICIPATE
GAIT LEVEL OF ASSIST: CONTACT GUARD ASSIST
DISTANCE (FEET): 0
GAIT LEVEL OF ASSIST: CONTACT GUARD ASSIST
GAIT LEVEL OF ASSIST: CONTACT GUARD ASSIST
ASSISTIVE DEVICE: PARALLEL BARS
GAIT LEVEL OF ASSIST: UNABLE TO PARTICIPATE
GAIT LEVEL OF ASSIST: CONTACT GUARD ASSIST
GAIT LEVEL OF ASSIST: UNABLE TO PARTICIPATE
ASSISTIVE DEVICE: FRONT WHEEL WALKER
DISTANCE (FEET): 20
DISTANCE (FEET): 195
DEVIATION: BRADYKINETIC
GAIT LEVEL OF ASSIST: UNABLE TO PARTICIPATE
ASSISTIVE DEVICE: FRONT WHEEL WALKER
GAIT LEVEL OF ASSIST: UNABLE TO PARTICIPATE
ASSISTIVE DEVICE: PARALLEL BARS
ASSISTIVE DEVICE: FRONT WHEEL WALKER
DEVIATION: ANTALGIC;BRADYKINETIC
GAIT LEVEL OF ASSIST: MINIMAL ASSIST
GAIT LEVEL OF ASSIST: UNABLE TO PARTICIPATE
GAIT LEVEL OF ASSIST: UNABLE TO PARTICIPATE
DISTANCE (FEET): 8
GAIT LEVEL OF ASSIST: MINIMAL ASSIST
GAIT LEVEL OF ASSIST: STANDBY ASSIST
ASSISTIVE DEVICE: FRONT WHEEL WALKER
GAIT LEVEL OF ASSIST: UNABLE TO PARTICIPATE
DISTANCE (FEET): 400
DISTANCE (FEET): 20
GAIT LEVEL OF ASSIST: CONTACT GUARD ASSIST
DISTANCE (FEET): 150
DISTANCE (FEET): 300
GAIT LEVEL OF ASSIST: UNABLE TO PARTICIPATE
DEVIATION: ANTALGIC;BRADYKINETIC
GAIT LEVEL OF ASSIST: STANDBY ASSIST
ASSISTIVE DEVICE: FRONT WHEEL WALKER
DEVIATION: ANTALGIC;BRADYKINETIC
DISTANCE (FEET): 120
GAIT LEVEL OF ASSIST: STANDBY ASSIST
ASSISTIVE DEVICE: FRONT WHEEL WALKER
GAIT LEVEL OF ASSIST: UNABLE TO PARTICIPATE
ASSISTIVE DEVICE: PARALLEL BARS
GAIT LEVEL OF ASSIST: UNABLE TO PARTICIPATE
GAIT LEVEL OF ASSIST: CONTACT GUARD ASSIST

## 2023-01-01 ASSESSMENT — FIBROSIS 4 INDEX
FIB4 SCORE: 0.75
FIB4 SCORE: 2.51
FIB4 SCORE: 27.3
FIB4 SCORE: 0.67
FIB4 SCORE: 1.03
FIB4 SCORE: 0.67
FIB4 SCORE: 5.46
FIB4 SCORE: 0.74
FIB4 SCORE: 0.75
FIB4 SCORE: 1.08
FIB4 SCORE: 1.06
FIB4 SCORE: 1.04
FIB4 SCORE: 1.53
FIB4 SCORE: 0.81
FIB4 SCORE: 5.36
FIB4 SCORE: 13.65
FIB4 SCORE: 1.17
FIB4 SCORE: 1.51
FIB4 SCORE: 0.96
FIB4 SCORE: 1
FIB4 SCORE: 0.83
FIB4 SCORE: 1.04
FIB4 SCORE: 2.51
FIB4 SCORE: 0.81
FIB4 SCORE: 0.88
FIB4 SCORE: 1.025138571564396317
FIB4 SCORE: 0.77
FIB4 SCORE: 0.67
FIB4 SCORE: 0.96
FIB4 SCORE: 10.44
FIB4 SCORE: 3.63
FIB4 SCORE: 0.74
FIB4 SCORE: 0.74
FIB4 SCORE: 0.81
FIB4 SCORE: 3.66
FIB4 SCORE: 1.55
FIB4 SCORE: 10.71
FIB4 SCORE: 1.24
FIB4 SCORE: 0.76
FIB4 SCORE: 4.19
FIB4 SCORE: 22.57
FIB4 SCORE: 2.85
FIB4 SCORE: 0.75
FIB4 SCORE: 0.75
FIB4 SCORE: 0.69
FIB4 SCORE: 1.025138571564396317
FIB4 SCORE: 0.81
FIB4 SCORE: 1.19
FIB4 SCORE: 1.24
FIB4 SCORE: 25.52
FIB4 SCORE: 0.76
FIB4 SCORE: 1.24
FIB4 SCORE: 3.7
FIB4 SCORE: 7.64
FIB4 SCORE: 4.19
FIB4 SCORE: 0.81
FIB4 SCORE: 7.91
FIB4 SCORE: 1.43
FIB4 SCORE: 0.76

## 2023-01-01 ASSESSMENT — PAIN SCALES - GENERAL
PAIN_LEVEL: 2
PAIN_LEVEL: 4
PAIN_LEVEL: 2
PAIN_LEVEL: 0
PAIN_LEVEL: 6

## 2023-01-01 ASSESSMENT — BALANCE ASSESSMENTS
STANDING UNSUPPORTED WITH FEET TOGETHER: 2
PLACE ALTERNATE FOOT ON STEP OR STOOL WHILE STANDING UNSUPPORTED: 3
LONG VERSION TOTAL SCORE (MAX 56): 42
TURN 360 DEGREES: 2
STANDING UNSUPPORTED: 4
MEDICARE IMPAIRMENT PERCENTAGE: 25
LONG VERSION TOTAL SCORE (MAX 56): 42
SITTING UNSUPPORTED: 4
TRANSFERS: 3
REACHING FORWARD WITH OUTSTRETCHED ARM WHILE STANDING: 2
STANDING ON ONE LEG: 1
PICK UP OBJECT FROM THE FLOOR FROM A STANDING POSITION: 4
STANDING UNSUPPORTED WITH EYES CLOSED: 3
STANDING UNSUPPORTED ONE FOOT IN FRONT: 2
STANDING TO SITTING: 4
SITTING TO STANDING: 4
LOOK OVER LEFT AND RIGHT SHOULDERS WHILE STANDING: 4

## 2023-01-01 ASSESSMENT — CHA2DS2 SCORE
AGE 75 OR GREATER: NO
DIABETES: NO
CHA2DS2 VASC SCORE: 1
AGE 65 TO 74: NO
AGE 75 OR GREATER: NO
CHA2DS2 VASC SCORE: 3
DIABETES: NO
CHF OR LEFT VENTRICULAR DYSFUNCTION: YES
HYPERTENSION: NO
DIABETES: NO
HYPERTENSION: NO
AGE 75 OR GREATER: NO
CHA2DS2 VASC SCORE: 1
AGE 65 TO 74: NO
CHF OR LEFT VENTRICULAR DYSFUNCTION: YES
PRIOR STROKE OR TIA OR THROMBOEMBOLISM: YES
SEX: MALE
PRIOR STROKE OR TIA OR THROMBOEMBOLISM: NO
PRIOR STROKE OR TIA OR THROMBOEMBOLISM: NO
AGE 65 TO 74: NO
VASCULAR DISEASE: NO
VASCULAR DISEASE: NO
SEX: MALE
SEX: MALE
HYPERTENSION: NO
VASCULAR DISEASE: NO
CHF OR LEFT VENTRICULAR DYSFUNCTION: YES

## 2023-01-01 ASSESSMENT — 6 MINUTE WALK TEST (6MWT)
GAIT SPEED - METERS PER SECOND: 0.38
LEVEL OF ASSISTANCE: SUPERVISION
TOTAL DISTANCE WALKED (FT): 450
LEVEL OF ASSISTANCE: MODIFIED INDEPENDENT
NUMBER OF RESTS: 2
GAIT SPEED - METERS PER SECOND: 0.46
NUMBER OF RESTS: 0
TOTAL DISTANCE WALKED (FT): 538

## 2023-01-01 ASSESSMENT — COPD QUESTIONNAIRES
COPD SCREENING SCORE: 5
HAVE YOU SMOKED AT LEAST 100 CIGARETTES IN YOUR ENTIRE LIFE: YES
DO YOU EVER COUGH UP ANY MUCUS OR PHLEGM?: YES, A FEW DAYS A WEEK OR MONTH
DURING THE PAST 4 WEEKS HOW MUCH DID YOU FEEL SHORT OF BREATH: SOME OF THE TIME

## 2023-01-21 NOTE — PROGRESS NOTES
"Chief Complaint   Patient presents with    Foot Injury     Pt states yesterday had stepped on a toy and fell. Pt states his left foot is swollen and in a lot of pain, and can barely move it. Pt states he does have RA    Pharyngitis     Pt states it hard to explain but his lower throat hurts and noticed that he has some skin peeling in his mouth     Blister     Pt states random blisters just showed up on both of his arms, they turned into scabs and hasn't went away            Chief Complaint   Patient presents with    Foot Injury     Pt states yesterday had stepped on a toy and fell. Pt states his left foot is swollen and in a lot of pain, and can barely move it. Pt states he does have RA    Pharyngitis                      #1.   C/o constant, sharp left foot pain x 2 days after he stepped on child's toy at home.    Pain is worse with wtbearing.    Motrin helps, \"a little.\"            Pertinent negatives include no  loss of motion, muscle weakness, numbness or tingling. The symptoms are aggravated by weight bearing and palpation.        #2. Sore throat x 3 d.       #3.   Painful sores on both arms x 4 d      Social History     Tobacco Use    Smoking status: Every Day     Packs/day: 0.25     Types: Cigarettes, Cigars    Smokeless tobacco: Never    Tobacco comments:     occasional cigar   Vaping Use    Vaping Use: Never used   Substance Use Topics    Alcohol use: No     Alcohol/week: 0.0 oz     Comment: quit 2011 ago- past ETOH abuse    Drug use: No         Past Medical History:   Diagnosis Date    Hypertension 05/03/2021    pt states well controlled on meds    Osteomyelitis ([facility]) 10/7/2019    Pubic ramus fracture ([facility]) 9/28/2019    Anemia 09/2019    Rheumatoid arteritis ([facility]) 09/2019    knee, feet right hand    Pain 09/2019    Chronic knee and back pain    History of bleeding ulcers 2/12/2015    Bleeding ulcer 5/21/2009    Obesity, morbid ([facility]) 5/21/2009    Anxiety 5/21/2009    Insomnia 5/21/2009    Allergic rhinitis " "5/21/2009    Eczema 5/21/2009    Bleeding ulcer 5/21/2009    History of anemia-now on nexium     Sleep apnea 5/21/2009    Did not tolerate cpap, does not use it    Back pain 5/21/2009    Migraine 5/21/2009    Depression 5/21/2009    ADD (attention deficit disorder)     Alcohol abuse     Apnea, sleep     Arrhythmia     afib when hospitaized for infection    Bipolar disorder ([facility]CC)     Bowel habit changes     constipation related to narcotics    Daytime sleepiness     GERD (gastroesophageal reflux disease)     Heart burn     on meds    Heart murmur     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   HENT - denies cough, ear pain, congestion, sore throat  Eyes: denies vision changes, discharge  Respiratory: Negative for cough and wheezing.    Cardiovascular: Negative for chest pain or PND.   Gastrointestinal:  No abdominal pain,  nausea, vomiting, diarrhea.  Negative for  blood in stool.    - no discharge, dysuria, frequency.      Neurological: Negative for dizziness and headaches.   musculoskeletal - denies myalgias, calf pain  Psych - denies anxiety/depression/mood changes.  Skin: no itching or rash  All other systems reviewed and are negative.           Objective:     /88   Pulse 71   Temp 36.2 °C (97.2 °F) (Temporal)   Resp 18   Ht 1.575 m (5' 2\")   Wt 88.5 kg (195 lb 3.2 oz)   SpO2 98%         Physical Exam   Constitutional: pt is oriented to person, place, and time. Pt appears well-developed. No distress.   HENT:   Head: Normocephalic and atraumatic.   Eyes: Conjunctivae are normal.   Cardiovascular: Normal rate and regular rhythm.    Pulmonary/Chest: Effort normal and breath sounds normal.   Musculoskeletal:        Left ankle: pt exhibits no edema or ecchymosis.    No medial " or lateral malleolus tenderness noted.        Achilles tendon normal.        Left foot: + tender to palpation over lateral foot.    There is normal range of motion, normal capillary refill and no crepitus.   Neurological: pt is alert and oriented to person, place, and time. No cranial nerve deficit.   Skin:   Bilat forearms:   there are several small, erythematous papules/pustules with honey crusted discharge.   Left foot:   + erythema over sole of foot and small open, shallow ulceration draining purulent fluid.   Psychiatric: His behavior is normal.   Nursing note and vitals reviewed.    Narrative & Impression     1/21/2023 11:49 AM     HISTORY/REASON FOR EXAM:  Pain/Deformity Following Trauma.     TECHNIQUE/EXAM DESCRIPTION AND NUMBER OF VIEWS:  3 nonweightbearing views of the LEFT foot.     COMPARISON:  4/1/2022.     FINDINGS:  Osteopenia is again present, decreasing sensitivity of the study for detection of acute displaced fracture.     There is flat foot deformity as seen previously.     No acute displaced fracture is noted.     Mild solid periosteal reaction seen along the second third and fourth metatarsal shafts is new on the second and third.     There is no subluxation.     There is no unusual degeneration.     IMPRESSION:     Severe osteopenia and flat foot deformity without evidence of acute displaced fracture     Metatarsal shaft solid periosteal reaction could indicate stress response           Exam Ended: 01/21/23 12:19 PM Last Resulted: 01/21/23 12:26 PM         Result Notes  Details    Reading Physician Reading Date Result Priority   Gatito Lima M.D.  [phone] 1/21/2023 [facility]     Narrative & Impression     1/21/2023 11:49 AM     HISTORY/REASON FOR EXAM:  Pain/Deformity Following Trauma.     TECHNIQUE/EXAM DESCRIPTION AND NUMBER OF VIEWS:  3 nonweightbearing views of the LEFT foot.     COMPARISON:  4/1/2022.     FINDINGS:  Osteopenia is again present, decreasing sensitivity of the  study for detection of acute displaced fracture.     There is flat foot deformity as seen previously.     No acute displaced fracture is noted.     Mild solid periosteal reaction seen along the second third and fourth metatarsal shafts is new on the second and third.     There is no subluxation.     There is no unusual degeneration.     IMPRESSION:     Severe osteopenia and flat foot deformity without evidence of acute displaced fracture     Metatarsal shaft solid periosteal reaction could indicate stress response           Exam Ended: 01/21/23 12:19 PM Last Resulted: 01/21/23 12:26 PM                  Assessment & Plan        1. Sore throat   Rapid strep was negative.      Sore throat is likely viral   Follow up in one week if no improvement, sooner if symptoms worsen.         2. Contusion of left foot, initial encounter      X-rays were personally reviewed by myself.   There is no fracture, but metatarsal shaft solid periosteal reaction could indicate stress response    Will f/u PCP     - DX-FOOT-COMPLETE 3+ LEFT; Future    3. Wound infection, left foot.    Rx bactrim    - ANAEROBIC/AEROBIC/GRAM STAIN    Follow up in one week if no improvement, sooner if symptoms worsen.

## 2023-01-28 NOTE — PROGRESS NOTES
NAM informed of situation with patient. Per Radiologist, informed pt of risk of blood infection from needlestick. Pt advised to see primary care provider regarding situation and rescheduling.

## 2023-01-28 NOTE — PROGRESS NOTES
Subjective     Richard Hubbard II is a 47 y.o. male who presents with Yeast Infection and Rash (All over body, x 2 weeks, getting worse, itchy )            This is a new problem.  The patient presents to clinic complaining of a diffuse rash.  The patient states the rash has been present for the past 2 weeks, and has gradually been worsening.  The patient believes the rash to be related to a yeast infection.  The patient reports a history of same.  The patient states the rash is itchy.  The patient reports no new exposures to soaps, lotions, or detergents.  He reports no associated fever.  The patient states he has been using his prescribed antifungal agents, including miconazole and nystatin powder, without improvement of his symptoms.  The patient states he has also applied cornstarch to his skin folds to help with moisture.    Rash  This is a new problem. Episode onset: x 2 weeks ago. The problem has been gradually worsening since onset. The rash is diffuse. The rash is characterized by itchiness. He was exposed to nothing. Pertinent negatives include no congestion, cough, fever or vomiting. Treatments tried: The patient has been using his prescribed antifungal agents without improvement.       PMH:  has a past medical history of ADD (attention deficit disorder), Alcohol abuse, Allergic rhinitis (5/21/2009), Anemia (09/2019), Anxiety (5/21/2009), Apnea, sleep, Arrhythmia, Back pain (5/21/2009), Bipolar disorder ([facility]), Bleeding ulcer (5/21/2009), Bleeding ulcer (5/21/2009), Bowel habit changes, Daytime sleepiness, Depression (5/21/2009), Eczema (5/21/2009), GERD (gastroesophageal reflux disease), Heart burn, Heart murmur, Hemorrhagic disorder ([facility]), History of bleeding ulcers (2/12/2015), Hypertension (05/03/2021), Insomnia (5/21/2009), Migraine (5/21/2009), Morning headache, Obesity, morbid ([facility]) (5/21/2009), Osteomyelitis ([facility]) (10/7/2019), Pain (09/2019), Pubic ramus fracture ([facility]) (9/28/2019), Rheumatoid  arteritis ([facility]CC) (09/2019), Sleep apnea (5/21/2009), and Snoring.    He has no past medical history of Hyperlipidemia.  MEDS:   Current Outpatient Medications:     sulfamethoxazole-trimethoprim (BACTRIM DS) 800-160 MG tablet, Take 1 Tablet by mouth 2 times a day for 7 days., Disp: 14 Tablet, Rfl: 0    Adalimumab (HUMIRA PEN) 40 MG/0.4ML Pen-injector Kit, INJECT 40MG SUBCUTANEOUSLY  EVERY 2 WEEKS, Disp: 2 Each, Rfl: 4    linezolid (ZYVOX) 600 MG Tab, Take 1 Tablet by mouth 2 times a day., Disp: 20 Tablet, Rfl: 0    Adalimumab 40 MG/0.4ML Pen-injector Kit, INJECT 40MG UNDER THE SKIN EVERY 14 DAYS, Disp: 2 Each, Rfl: 0    predniSONE (DELTASONE) 10 MG Tab, , Disp: , Rfl:     miconazole (MICOTIN) 2 % Cream, APPLY 1 GRAM TOPICALLY TWO TIMES A DAY, Disp: 60 g, Rfl: 1    HUMIRA PEN 40 MG/0.4ML Pen-injector Kit, , Disp: , Rfl:     morphine (MS IR) 15 MG tablet, , Disp: , Rfl:     morphine ER (MS CONTIN) 15 MG Tab CR tablet, , Disp: , Rfl:     nystatin (MYCOSTATIN) powder, APPLY 1 GRAM TO AFFECTED AREA(S) TOPICALLY FOUR TIMES A DAY, Disp: 30 g, Rfl: 6    omeprazole (PRILOSEC) 40 MG delayed-release capsule, Take 1 capsule by mouth every day., Disp: 30 capsule, Rfl: 6    betamethasone dipropionate 0.05 % Cream, Apply 1 g topically to affected areas twice daily for 14 days., Disp: 45 g, Rfl: 11    furosemide (LASIX) 20 MG Tab, Take 1 tablet by mouth 1 time a day as needed (for leg swelling)., Disp: 30 tablet, Rfl: 1    XIIDRA 5 % Solution, , Disp: , Rfl:     ascorbic acid (VITAMIN C) 500 MG tablet, Take 1 Tab by mouth 2 times a day, with meals., Disp: 60 Tab, Rfl: 0    vitamin D 2000 UNIT Tab, Take 1 Tab by mouth every day., Disp: 60 Tab, Rfl:     docusate sodium 100 MG Cap, Take 100 mg by mouth 2 Times a Day., Disp: 60 Cap, Rfl:     acetaminophen (TYLENOL) 500 MG Tab, Take 500-1,000 mg by mouth every 6 hours as needed., Disp: , Rfl:     Naloxone (NARCAN) 4 MG/0.1ML Liquid, One spray in one nostril for overdose and call 911.  "(Patient taking differently: Administer 1 Spray into affected nostril(S) as needed. One spray in one nostril for overdose and call 911.), Disp: 1 Package, Rfl: 0    losartan (COZAAR) 25 MG Tab, Take 1 Tab by mouth every day., Disp: 90 Tab, Rfl: 3    ferrous sulfate 325 (65 Fe) MG tablet, Take 1 Tab by mouth 2 times a day, with meals., Disp: 60 Tab, Rfl: 0  ALLERGIES:   Allergies   Allergen Reactions    Benadryl [Altaryl]      \"I get agitated\"    Nsaids      Bleeding, \"I had a bleeding ulcer\"    Phenergan [Promethazine Hcl]      \"I get very agitated\"    Penicillins      \"Had some dizziness\"  Tolerated Unasyn 10/2019     SURGHX:   Past Surgical History:   Procedure Laterality Date    PB TOTAL KNEE ARTHROPLASTY Right 7/22/2020    Procedure: ARTHROPLASTY, KNEE, TOTAL;  Surgeon: Temo Pires M.D.;  Location: SURGERY [facility];  Service: Orthopedics    TENDON TRANSFER Right 1/22/2020    Procedure: RIGHT DISTAL ULNA RESECTION AND EXTENSOR TENDON TRANSFER;  Surgeon: Marine Rushing M.D.;  Location: [facility];  Service: Orthopedics    OTHER ORTHOPEDIC SURGERY Right 2020    total right knee replacement    IRRIGATION & DEBRIDEMENT ORTHO Left 10/9/2019    Procedure: IRRIGATION AND DEBRIDEMENT, WOUND - PELVIC OSTEOMYELITIS;  Surgeon: You Olivares M.D.;  Location: SURGERY [facility];  Service: Orthopedics    OTHER SURGICAL PROCEDURE  2019    osteomelitis of pelvis cleaned    GASTRIC BYPASS LAPAROSCOPIC  2000    Lucila en y    CHOLECYSTECTOMY  2000    OTHER SURGICAL PROCEDURE      repair of broken penis     SOCHX:  reports that he has been smoking cigarettes and cigars. He has been smoking an average of .25 packs per day. He has never used smokeless tobacco. He reports that he does not drink alcohol and does not use drugs.  FH: Family history was reviewed, no pertinent findings to report    Review of Systems   Constitutional:  Negative for fever.   HENT:  Negative for congestion.    Eyes:  " "Negative for discharge and redness.   Respiratory:  Negative for cough.    Gastrointestinal:  Negative for nausea and vomiting.   Skin:  Positive for itching and rash.            Objective     /70 (BP Location: Left arm, Patient Position: Sitting, BP Cuff Size: Adult)   Pulse 65   Temp 36.1 °C (97 °F) (Temporal)   Resp 18   Ht 1.575 m (5' 2\")   Wt 88.5 kg (195 lb)   SpO2 92%   BMI 35.67 kg/m²      Physical Exam  Constitutional:       General: He is not in acute distress.     Appearance: Normal appearance. He is well-developed. He is not ill-appearing.   HENT:      Head: Normocephalic and atraumatic.      Right Ear: External ear normal.      Left Ear: External ear normal.   Eyes:      Extraocular Movements: Extraocular movements intact.      Conjunctiva/sclera: Conjunctivae normal.   Cardiovascular:      Rate and Rhythm: Normal rate.   Pulmonary:      Effort: Pulmonary effort is normal.   Musculoskeletal:      Cervical back: Normal range of motion and neck supple.   Skin:     General: Skin is warm and dry.      Findings: Rash present.      Comments:   The patient has localized areas of erythema to the skin folds with associated exudates, including the lower abdomen, bilateral inguinal region, and scrotum with surrounding satellite lesions.  The patient also has a localized area of erythema to his lower back with surrounding satellite lesions.  No tenderness palpation.  No edema.  No open wounds/lesions.  No discharge/weeping.  No crusting.  No vesicles.  No pustules.   Neurological:      Mental Status: He is alert and oriented to person, place, and time.                           Assessment & Plan          1. Candidiasis of skin  - fluconazole (DIFLUCAN) 200 MG Tab; Take 1 Tablet by mouth every 7 days for 4 doses.  Dispense: 4 Tablet; Refill: 0  - nystatin (MYCOSTATIN) powder; Apply 1 g topically in the morning, at noon, and at bedtime.  Dispense: 60 g; Refill: 1    The patient's presenting symptoms and " physical exam findings are consistent with candidiasis of the skin.  Given the extent of the patient's cutaneous yeast infection, will prescribe the patient oral fluconazole for his current symptoms.  Will also refill the patient's nystatin powder to use in the areas of the skin folds to help with increased moisture.  Advised the patient to monitor for worsening signs or symptoms.  Recommend OTC medications and supportive care for symptomatic management.  Recommend patient follow-up with PCP.  Discussed her precautions with the patient, and he verbalized understanding.    Differential diagnoses, supportive care, and indications for immediate follow-up discussed with patient.   Instructed to return to clinic or nearest emergency department for any change in condition, further concerns, or worsening of symptoms.    OTC Tylenol or Motrin for fever/discomfort.  OTC antihistamines for symptomatic relief of itching  Monitor for worsening signs and or symptoms  Keep areas clean and dry  Follow-up with PCP  Return to clinic or go to the ED if symptoms worsen or fail to improve, or if the patient develop worsening/increasing/persistent skin rash, itchiness, pain/redness, swelling, increased redness or warmth, discharge/drainage, fever/chills, secondary signs of infection, and/or any concerning symptoms.    Discussed plan with the patient, and he agrees to the above.    I personally reviewed prior external notes and test results pertinent to today's visit.  I have independently reviewed and interpreted all diagnostics ordered during this urgent care visit.     Please note that this dictation was created using voice recognition software. I have made every reasonable attempt to correct obvious errors, but I expect that there may be errors of grammar and possibly content that I did not discover before finalizing the note.     This note was electronically signed by Skye Sanches PA-C

## 2023-01-28 NOTE — PROGRESS NOTES
Pt completed bilat ankle MRI without contrast. Upon attempting IV access, MRI technician approached RN for help. Upon inspection of pt, this RN noticed a rash on patients skin. Pt states he has a current Staph infection. Radiologist consulted and advised to reschedule after infection clears.

## 2023-02-28 PROBLEM — E87.20 LACTIC ACIDOSIS: Status: ACTIVE | Noted: 2023-01-01

## 2023-02-28 PROBLEM — E87.1 HYPONATREMIA: Status: ACTIVE | Noted: 2023-01-01

## 2023-02-28 PROBLEM — A41.9 SEPSIS (HCC): Status: ACTIVE | Noted: 2023-01-01

## 2023-02-28 PROBLEM — D69.6 THROMBOCYTOPENIA (HCC): Status: ACTIVE | Noted: 2023-01-01

## 2023-02-28 PROBLEM — G93.40 ENCEPHALOPATHY: Status: ACTIVE | Noted: 2023-01-01

## 2023-02-28 PROBLEM — E16.2 HYPOGLYCEMIA: Status: ACTIVE | Noted: 2023-01-01

## 2023-02-28 NOTE — CONSULTS
Critical Care Consultation    Date of consult: 2/28/2023    Referring Physician  Angelita Alvarado M.D.    Reason for Consultation  Sepsis, AMS and hypoglycemia    History of Presenting Illness  47 y.o. male w/PMHx severe deforming rheumatoid arthritis chronically on/off/on Humira with prior history of osteomyelitis who presented 2/28/2023 with confusion, hypoglycemia and cellulitis left lower extremity.  Wife describes fever with very poor p.o. intake for last several days.  She also describes having a cold herself and that her  had similar symptoms including cough, N/V, diarrhea and fever.  He appeared dehydrated in the ER and received IV fluids.  Initial lactic was 2.4 and reduced to 2.2 after 2 L.  Blood sugar has been running in the 50s and despite glucose supplement he was continuing to dip and D10 infusion was initiated in the ER.  He has never been tachycardic and his blood pressures been in the 90s throughout.  He was pancultured and started on broad-spectrum antibiotics for cellulitis including vancomycin and clindamycin.  Orthopedic surgery is evaluating the patient as well.  CT imaging lower extremities is pending, some concern of osteomyelitis in his feet which have significant deformities and chronic pressure sores.  Plain films of lower extremities did not reveal fracture or changes consistent with osteomyelitis.    Code Status  Full Code    Review of Systems  Review of Systems   Constitutional:  Positive for fever and malaise/fatigue.   HENT:  Negative for congestion and sore throat.    Respiratory:  Positive for cough. Negative for sputum production and shortness of breath.    Cardiovascular:  Positive for leg swelling. Negative for chest pain.   Gastrointestinal:  Positive for diarrhea, nausea and vomiting. Negative for blood in stool.   Musculoskeletal:  Positive for myalgias.   Neurological:  Negative for headaches.   Endo/Heme/Allergies:  Negative for polydipsia.     Past Medical History    has a past medical history of ADD (attention deficit disorder), Alcohol abuse, Allergic rhinitis (5/21/2009), Anemia (09/2019), Anxiety (5/21/2009), Apnea, sleep, Arrhythmia, Back pain (5/21/2009), Bipolar disorder ([facility]), Bleeding ulcer (5/21/2009), Bleeding ulcer (5/21/2009), Bowel habit changes, Daytime sleepiness, Depression (5/21/2009), Eczema (5/21/2009), GERD (gastroesophageal reflux disease), Heart burn, Heart murmur, Hemorrhagic disorder ([facility]), History of bleeding ulcers (2/12/2015), Hypertension (05/03/2021), Insomnia (5/21/2009), Migraine (5/21/2009), Morning headache, Obesity, morbid (HCC) (5/21/2009), Osteomyelitis ([facility]) (10/7/2019), Pain (09/2019), Pubic ramus fracture ([facility]) (9/28/2019), 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    He has no past medical history of Hyperlipidemia.    Surgical History   has a past surgical history that includes gastric bypass laparoscopic (2000); irrigation & debridement ortho (Left, 10/9/2019); other surgical procedure; other surgical procedure (2019); pr total knee arthroplasty (Right, 7/22/2020); cholecystectomy (2000); tendon transfer (Right, 1/22/2020); and other orthopedic surgery (Right, 2020).    Family History  family history includes Arthritis in his mother; Cancer in his father and maternal grandmother; Depression in his mother; Heart Disease in his maternal grandmother; Hypertension in his mother; Psychiatric Illness in an other family member; Schizophrenia in his father; Stroke in his maternal aunt.    Social History   reports that he has been smoking cigarettes and cigars. He has been smoking an average of .25 packs per day. He has never used smokeless tobacco. He reports that he does not drink alcohol and does not use drugs.    Medications  Home Medications       Reviewed by Gael Garcia (Pharmacy Tech) on 02/28/23 at 1324  Med List Status: Complete     Medication Last Dose Status   Adalimumab (HUMIRA PEN) 40  MG/0.4ML Pen-injector Kit > 1 WEEK Active   betamethasone dipropionate 0.05 % Cream > 3 weeks Active   miconazole (MICOTIN) 2 % Cream > 3 weeks Active   morphine (MS IR) 15 MG tablet 2/27/2023 Active   morphine ER (MS CONTIN) 15 MG Tab CR tablet 2/27/2023 Active   Naloxone (NARCAN) 4 MG/0.1ML Liquid PRN Active   omeprazole (PRILOSEC) 40 MG delayed-release capsule 2/27/2023 Active                  Current Facility-Administered Medications   Medication Dose Route Frequency Provider Last Rate Last Admin    D5 NS infusion   Intravenous CONTINUOUS (1600 Start) Irving Armenta M.D.   Continue to Floor at 02/28/23 1625    dextrose 10 % BOLUS 25 g  25 g Intravenous Q15 MIN PRN Irving Armenta M.D. 999 mL/hr at 02/28/23 1530 25 g at 02/28/23 1530    D10%-0.45% NaCl infusion   Intravenous Continuous Irving Armenta M.D.   Continue to Floor at 02/28/23 1625    omeprazole (PRILOSEC) delayed-release capsule CPDR 40 mg  40 mg Oral DAILY Ambrosio Hill M.D.        senna-docusate (PERICOLACE or SENOKOT S) 8.6-50 MG per tablet 2 Tablet  2 Tablet Oral BID Ambrosio Hill M.D.        And    polyethylene glycol/lytes (MIRALAX) PACKET 1 Packet  1 Packet Oral QDAY PRN Ambrosio Hill M.D.        And    magnesium hydroxide (MILK OF MAGNESIA) suspension 30 mL  30 mL Oral QDAY PRN Ambrosio Hill M.D.        And    bisacodyl (DULCOLAX) suppository 10 mg  10 mg Rectal QDAY PRN Ambrosio Hill M.D.        lactated ringers infusion (BOLUS)  500 mL Intravenous Once PRN Ambrosio Hill M.D.        acetaminophen (Tylenol) tablet 650 mg  650 mg Oral Q6HRS PRN Ambrosio Hill M.D.        Pharmacy Consult Request ...Pain Management Review 1 Each  1 Each Other PHARMACY TO DOSE Ambrosio F Solomon Merhi, M.D.        oxyCODONE immediate-release (ROXICODONE) tablet 5 mg  5 mg Oral Q3HRS PRN Ambrosio Hill M.D.        Or    oxyCODONE immediate-release (ROXICODONE) tablet 10 mg  10 mg Oral Q3HRS PRN Ambrosio MORALES  "Solomon Hill M.D.        Or    HYDROmorphone (Dilaudid) injection 0.5 mg  0.5 mg Intravenous Q3HRS PRN Ambrosio Hill M.D.        piperacillin-tazobactam (Zosyn) 4.5 g in  mL IVPB  4.5 g Intravenous Once Ambrosio Hill M.D.        And    piperacillin-tazobactam (Zosyn) 4.5 g in  mL IVPB  4.5 g Intravenous Q8HRS Ambrosio Hill M.D.        Linezolid (ZYVOX) premix 600 mg  600 mg Intravenous BID Ambrosio Hill M.D.        hydrocortisone sodium succinate PF (Solu-CORTEF) 100 MG injection 50 mg  50 mg Intravenous Q8HRS Ambrosio Hill M.D.         Current Outpatient Medications   Medication Sig Dispense Refill    Adalimumab (HUMIRA PEN) 40 MG/0.4ML Pen-injector Kit INJECT 40MG SUBCUTANEOUSLY  EVERY 2 WEEKS 2 Each 4    miconazole (MICOTIN) 2 % Cream APPLY 1 GRAM TOPICALLY TWO TIMES A DAY 60 g 1    morphine (MS IR) 15 MG tablet Take 15 mg by mouth every 6 hours as needed. Indications: Pain      morphine ER (MS CONTIN) 15 MG Tab CR tablet Take 15 mg by mouth every 12 hours.      omeprazole (PRILOSEC) 40 MG delayed-release capsule Take 1 capsule by mouth every day. 30 capsule 6    betamethasone dipropionate 0.05 % Cream Apply 1 g topically to affected areas twice daily for 14 days. 45 g 11    Naloxone (NARCAN) 4 MG/0.1ML Liquid One spray in one nostril for overdose and call 911. (Patient taking differently: Administer 1 Spray into affected nostril(S) as needed. One spray in one nostril for overdose and call 911.) 1 Package 0       Allergies  Allergies   Allergen Reactions    Benadryl [Altaryl]      \"I get agitated\"    Nsaids      Bleeding, \"I had a bleeding ulcer\"    Phenergan [Promethazine Hcl]      \"I get very agitated\"    Penicillins      \"Had some dizziness\"  Tolerated Unasyn 10/2019       Vital Signs last 24 hours  Temp:  [37.8 °C (100 °F)-38.3 °C (101 °F)] 37.8 °C (100 °F)  Pulse:  [] 96  Resp:  [12-21] 15  BP: ()/(50-67) 98/53  SpO2:  [91 %-99 %] 96 %    Physical " Exam  Physical Exam  Vitals reviewed.   Constitutional:       General: He is sleeping.      Appearance: He is overweight. He is ill-appearing. He is not toxic-appearing.      Interventions: Nasal cannula in place.   HENT:      Head: Normocephalic and atraumatic.      Mouth/Throat:      Mouth: Mucous membranes are dry.   Eyes:      General: No scleral icterus.     Extraocular Movements: Extraocular movements intact.      Pupils: Pupils are equal, round, and reactive to light.   Neck:      Vascular: No JVD.      Meningeal: Brudzinski's sign and Kernig's sign absent.   Cardiovascular:      Rate and Rhythm: Normal rate and regular rhythm.      Heart sounds: No murmur heard.    No gallop.      Comments: SR  Pulmonary:      Effort: No respiratory distress.      Breath sounds: No wheezing, rhonchi or rales.   Abdominal:      General: Bowel sounds are normal. There is no distension.      Palpations: Abdomen is soft.      Tenderness: There is no abdominal tenderness. There is no right CVA tenderness or guarding.      Hernia: A hernia (Incisional) is present.   Musculoskeletal:      Cervical back: Neck supple. No rigidity.      Right lower leg: Edema present.      Left lower leg: Edema present.      Comments: Both ankles are externally rotated chronically with deformed joints secondary to his RA with pressure sores on his soles of his feet from using a walker   Lymphadenopathy:      Cervical: No cervical adenopathy.   Skin:     General: Skin is warm and dry.      Capillary Refill: Capillary refill takes less than 2 seconds.      Coloration: Skin is not cyanotic or mottled.      Nails: There is no clubbing.      Comments: Left lower extremity greater than right lower extremity with marked increased redness, tenderness and warmth consistent with cellulitis, no blistering noted.   Neurological:      General: No focal deficit present.      Mental Status: He is easily aroused.   Psychiatric:         Behavior: Behavior is  cooperative.       Fluids  No intake or output data in the 24 hours ending 02/28/23 1631    Laboratory  Recent Results (from the past 48 hour(s))   Lactic acid (lactate)    Collection Time: 02/28/23 10:57 AM   Result Value Ref Range    Lactic Acid 2.4 (H) 0.5 - 2.0 mmol/L   CBC With Differential    Collection Time: 02/28/23 10:57 AM   Result Value Ref Range    WBC 9.5 4.8 - 10.8 K/uL    RBC 4.52 (L) 4.70 - 6.10 M/uL    Hemoglobin 13.1 (L) 14.0 - 18.0 g/dL    Hematocrit 39.6 (L) 42.0 - 52.0 %    MCV 87.6 81.4 - 97.8 fL    MCH 29.0 27.0 - 33.0 pg    MCHC 33.1 (L) 33.7 - 35.3 g/dL    RDW 49.6 35.9 - 50.0 fL    Platelet Count 146 (L) 164 - 446 K/uL    MPV 9.1 9.0 - 12.9 fL    Neutrophils-Polys 86.20 (H) 44.00 - 72.00 %    Lymphocytes 1.50 (L) 22.00 - 41.00 %    Monocytes 0.00 0.00 - 13.40 %    Eosinophils 0.00 0.00 - 6.90 %    Basophils 0.00 0.00 - 1.80 %    Nucleated RBC 0.00 /100 WBC    Neutrophils (Absolute) 9.15 (H) 1.82 - 7.42 K/uL    Lymphs (Absolute) 0.14 (L) 1.00 - 4.80 K/uL    Monos (Absolute) 0.00 0.00 - 0.85 K/uL    Eos (Absolute) 0.00 0.00 - 0.51 K/uL    Baso (Absolute) 0.00 0.00 - 0.12 K/uL    NRBC (Absolute) 0.00 K/uL   Comp Metabolic Panel    Collection Time: 02/28/23 10:57 AM   Result Value Ref Range    Sodium 129 (L) 135 - 145 mmol/L    Potassium 3.7 3.6 - 5.5 mmol/L    Chloride 97 96 - 112 mmol/L    Co2 23 20 - 33 mmol/L    Anion Gap 9.0 7.0 - 16.0    Glucose 123 (H) 65 - 99 mg/dL    Bun 14 8 - 22 mg/dL    Creatinine 1.13 0.50 - 1.40 mg/dL    Calcium 8.0 (L) 8.5 - 10.5 mg/dL    AST(SGOT) 125 (H) 12 - 45 U/L    ALT(SGPT) 54 (H) 2 - 50 U/L    Alkaline Phosphatase 194 (H) 30 - 99 U/L    Total Bilirubin 1.0 0.1 - 1.5 mg/dL    Albumin 2.3 (L) 3.2 - 4.9 g/dL    Total Protein 6.2 6.0 - 8.2 g/dL    Globulin 3.9 (H) 1.9 - 3.5 g/dL    A-G Ratio 0.6 g/dL   ESTIMATED GFR    Collection Time: 02/28/23 10:57 AM   Result Value Ref Range    GFR (CKD-EPI) 81 >60 mL/min/1.73 m 2   CORRECTED CALCIUM    Collection Time:  02/28/23 10:57 AM   Result Value Ref Range    Correct Calcium 9.4 8.5 - 10.5 mg/dL   CRP QUANTITIVE (NON-CARDIAC)    Collection Time: 02/28/23 10:57 AM   Result Value Ref Range    Stat C-Reactive Protein 18.81 (H) 0.00 - 0.75 mg/dL   Sed Rate    Collection Time: 02/28/23 10:57 AM   Result Value Ref Range    Sed Rate Westergren 20 0 - 20 mm/hour   PROCALCITONIN    Collection Time: 02/28/23 10:57 AM   Result Value Ref Range    Procalcitonin 98.50 (HH) <0.25 ng/mL   PLATELET ESTIMATE    Collection Time: 02/28/23 10:57 AM   Result Value Ref Range    Plt Estimation Decreased    MORPHOLOGY    Collection Time: 02/28/23 10:57 AM   Result Value Ref Range    RBC Morphology Normal    PERIPHERAL SMEAR REVIEW    Collection Time: 02/28/23 10:57 AM   Result Value Ref Range    Peripheral Smear Review see below    DIFFERENTIAL MANUAL    Collection Time: 02/28/23 10:57 AM   Result Value Ref Range    Bands-Stabs 10.10 (H) 0.00 - 10.00 %    Metamyelocytes 2.20 %    Manual Diff Status PERFORMED    POCT glucose device results    Collection Time: 02/28/23 11:38 AM   Result Value Ref Range    POC Glucose, Blood 46 (L) 65 - 99 mg/dL   POCT glucose device results    Collection Time: 02/28/23 12:18 PM   Result Value Ref Range    POC Glucose, Blood 97 65 - 99 mg/dL   VENOUS BLOOD GAS    Collection Time: 02/28/23 12:42 PM   Result Value Ref Range    Venous Bg Ph 7.35 7.31 - 7.45    Venous Bg Ph Temp Corrected 7.35 7.31 - 7.45    Venous Bg Pco2 41.1 41.0 - 51.0 mmHg    Venous Bg Pco2 Temp Corrected 41.8 41.0 - 51.0 mmHg    Venous Bg Po2 34.6 25.0 - 40.0 mmHg    Venous Bg Po2 Temp Corrected 35.6 25.0 - 40.0 mmHg    Venous Bg O2 Saturation 58.8 %    Venous Bg Hco3 22 (L) 24 - 28 mmol/L    Venous Bg Base Excess -3 mmol/L    Body Temp 37.4 Centigrade   POCT glucose device results    Collection Time: 02/28/23 12:50 PM   Result Value Ref Range    POC Glucose, Blood 71 65 - 99 mg/dL   POCT glucose device results    Collection Time: 02/28/23  1:17 PM    Result Value Ref Range    POC Glucose, Blood 58 (L) 65 - 99 mg/dL   Lactic acid (lactate): Repeat if initial lactic acid result is greater than 2    Collection Time: 23  2:16 PM   Result Value Ref Range    Lactic Acid 2.2 (H) 0.5 - 2.0 mmol/L   CoV-2, FLU A/B, and RSV by PCR (2-4 Hours CEPHEID) : Collect NP swab in VTM    Collection Time: 23  2:16 PM    Specimen: Respirate   Result Value Ref Range    Influenza virus A RNA Negative Negative    Influenza virus B, PCR Negative Negative    RSV, PCR Negative Negative    SARS-CoV-2 by PCR NotDetected     SARS-CoV-2 Source NP Swab    Salicylate    Collection Time: 23  2:16 PM   Result Value Ref Range    Salicylates, Quant. <1.0 (L) 15.0 - 25.0 mg/dL   ACETAMINOPHEN    Collection Time: 23  2:16 PM   Result Value Ref Range    Acetaminophen -Tylenol 11.0 10.0 - 30.0 ug/mL   DIAGNOSTIC ALCOHOL    Collection Time: 23  2:16 PM   Result Value Ref Range    Diagnostic Alcohol <10.1 <10.1 mg/dL   EKG    Collection Time: 23  3:34 PM   Result Value Ref Range    Report       [facility] Emergency Dept.    Test Date:  2023  Pt Name:    LESLIE BADILLO             Department: ER  MRN:        [id number]                      Room:       [facility]  Gender:     Male                         Technician: [id number]  :        1975                   Requested By:PUNEET KEARNEY  Order #:    [id number]                    Reading MD:    Measurements  Intervals                                Axis  Rate:       95                           P:          189  ME:         153                          QRS:        167  QRSD:       182                          T:          -4  QT:         450  QTc:        566    Interpretive Statements  Sinus or ectopic atrial rhythm  Consider dextrocardia  Compared to ECG 2020 11:54:06  Ectopic atrial rhythm now present  Sinus rhythm no longer present  First degree AV block no longer present  Intraventricular  conduction delay no longer present  ST (T wave) deviation no longer present     POCT glucose device results    Collection Time: 02/28/23  4:06 PM   Result Value Ref Range    POC Glucose, Blood 109 (H) 65 - 99 mg/dL       Imaging  US-EXTREMITY VENOUS LOWER UNILAT LEFT   Final Result      DX-TIBIA AND FIBULA LEFT   Final Result      1.  Diffuse soft tissue swelling, edema and/or cellulitis.   2.  No acute osseous abnormality.   3.  Osteopenia.      DX-FOOT-2- LEFT   Final Result      Severe osteopenia and flat foot deformity without evidence of acute displaced fracture      No definite radiographic evidence of osteomyelitis.      CT-HEAD W/O   Final Result      Head CT without contrast within normal limits. No evidence of acute cerebral infarction, hemorrhage or mass lesion.         DX-CHEST-PORTABLE (1 VIEW)   Final Result      No acute cardiac or pulmonary abnormalities are identified.      CT-EXTREMITY, LOWER WITH LEFT    (Results Pending)       Assessment/Plan  * Sepsis (HCC)- (present on admission)  Assessment & Plan  This is Sepsis Present on admission  SIRS criteria identified on my evaluation include: Fever, with temperature greater than 101 deg F, Tachycardia, with heart rate greater than 90 BPM and Tachypnea, with respirations greater than 20 per minute  Source is left lower extremity cellulitis  Patient immune compromised chronically on Humira for severe deforming RA with history of prior osteomyelitis  Sepsis protocol initiated  Fluid resuscitation ordered per protocol  Crystalloid Fluid Administration: Fluid resuscitation ordered per standard protocol - 30 mL/kg per current or ideal body weight  IV antibiotics as appropriate for source of sepsis  Reassessment: I have reassessed the patient's hemodynamic status    CXR clear and patient on room air  Abdomen benign although N/V historically  Appears dehydrated and likely part of his soft blood pressure findings  Lactic acid initially 2.4 and improved to 2.2  after fluid bolus 2 L  Hypoglycemia likely related to sepsis evaluating for other  Check cortisol level  D10 infusion  Blood culture sent from ER  Vancomycin/clindamycin for cellulitis  Orthopedic eval  CT imaging lower extremities  Okay to IMCU with low threshold to transfer to ICU as needed    Hypoglycemia  Assessment & Plan  Moderate to severe  Partly responding to IV dextrose  Likely secondary to sepsis/poor p.o. intake  Wife uses 3 hypoglycemic agents including insulin but denies that he has access to them  Other medications reviewed with clinical pharmacist, none associated hypoglycemia  Query depleted glycogen stores and wanted gluconeogenesis from sepsis    Titrate D10 infusion  Hypoglycemia protocols  Every hour fingerstick glucose for now  Initiate enteral nutrition    History of osteomyelitis- (present on admission)  Assessment & Plan  History of osteomyelitis 3 years ago felt perhaps related to Humira use in part  Rule out recurrent osteomyelitis in lower extremities, CT pending and orthopedics evaluating    Hyponatremia  Assessment & Plan  Mild, sodium 129  Likely hypovolemic hyponatremia  Support with IV fluids  Serial BMP    Thrombocytopenia (HCC)  Assessment & Plan  Mild,   Likely secondary to combination of RA and treatment/sepsis  Serial CBC    Encephalopathy  Assessment & Plan  Sleepy/disoriented  Likely related to sepsis  No meningeal signs  Hypoglycemia very likely contributing significantly to this time CT head negative  Exam nonfocal    History of immunosuppressive therapy- (present on admission)  Assessment & Plan  Back on Humira injections every 2 weeks from Dr. Conway's rheumatologist  Hold Humira for now and reassess    Paroxysmal atrial fibrillation (HCC)- (present on admission)  Assessment & Plan  Associated with previous episode of sepsis and osteomyelitis  Cardiac monitoring  Optimize electrolytes  Monitor    Rheumatoid arthritis (HCC)- (present on admission)  Assessment &  Plan  Longstanding history of severe deforming RA  Follows with Dr. Conway  On/off -> back on Humira    MONICA (obstructive sleep apnea)- (present on admission)  Assessment & Plan  Long history of MONICA  Query compliance  Encephalopathy may be in part profound sleep deprivation if he is not on treatment  2016 note: AHI 22, min sat 77%, corrected with CPAP 10  2021 telemetry note: Follow-up study are to be arranged-never performed?  Chronic narcotics likely exacerbating MONICA    Chronic use of opiate drug for therapeutic purpose- (present on admission)  Assessment & Plan  On chronic methadone for pain  Per ERP and wife possibly abusing loperamide  For now would reduce narcotic administration and reassess    S/P total knee arthroplasty, right- (present on admission)  Assessment & Plan  History of    H/o  essential hypertension- (present on admission)  Assessment & Plan  Off medicines at this time    History of bleeding ulcers  Assessment & Plan  Continue home PPI        Discussed patient condition and risk of morbidity and/or mortality with Hospitalist, Family, RN, Patient, and ER physician .

## 2023-02-28 NOTE — ED TRIAGE NOTES
Chief Complaint   Patient presents with    Flu Like Symptoms     X 3 days, patient reports malaise, fever, and diarrhea     Low Blood Sugar     40 per EMS. Patient given 250 cc D10. FSBS up to 111    Leg Swelling     Redness and warmth to the LLE. Chronic wounds to bilateral feet      Patient brought in by EMS from home. Patient given 360mg Tylenol per EMS   Patient A/Ox3.

## 2023-02-28 NOTE — ED PROVIDER NOTES
ED Provider Note    CHIEF COMPLAINT  Chief Complaint   Patient presents with    Flu Like Symptoms     X 3 days, patient reports malaise, fever, and diarrhea     Low Blood Sugar     40 per EMS. Patient given 250 cc D10. FSBS up to 111    Leg Swelling     Redness and warmth to the LLE. Chronic wounds to bilateral feet      EXTERNAL RECORDS REVIEWED  Patient has a history of chronic bilateral feet ulcer. He has a history of rheumatoid arthritis. He had MRIs done of both ankles in January. There was no evidence of osteomyelitis on the right but possibly on the left. Since that happened, patient has not been seen.     HPI/ROS  LIMITATION TO HISTORY   Select: Altered mental status / Confusion   OUTSIDE HISTORIAN(S):  EMS EMS note and Significant other Wife at bedside    Richard Hubbard II is a 47 y.o. male with history of rheumatoid arthritis on Humira as well as prior osteomyelitis of the hip and chronic lower extremity ulcers due to chronic foot deformity, who presents to the Emergency Department with for evaluation of malaise, confusion and flu like symptoms onset three days. Patient has had flu like symptoms for about three days. He reports of diarrhea, fever, and malaise. He is also complaining of left lower extremity redness and warmth; patient does not recall when this began. En route to the ED, EMS saw that patient's blood sugar was 40. He admits to associated symptoms of left shoulder pain, and cough, but denies recent falls, chest pain, shortness of breath, dysuria, loss of appetite or fluids. No alleviating factors were reported. No history of diabetes.  History initially limited due to confusion.      Wife at bedside providing history. She said that the patient started getting a fever on Saturday. He was having some upper respiratory symptoms like a cough. He then had diarrhea on Sunday and vomiting began last night. Wife endorses having a stomach bug last week. Home COVID test on Sunday was negative.  Wife states that patient's redness, pain, and warmth on the left lower extremity is new; she noticed it last night and it has worsened this morning. Patient takes morphine and Prilosec. Wife pulled me aside and reports that patient is abusing Loperamide. He has not told anyone, but she found a big bag of his medication. He was on the medication previously, and then was unable to come off of it. She believes he is taking 200 mg a day. Wife states that patient has bipolar, and the Loperamide makes him feel more calm.  Wife states that patient was admitted to [facility] many years ago due to Vicodin, Tramadol, and Aderoll addiction. Since this time, he has not acted abnormally.     PAST MEDICAL HISTORY  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    Hemorrhagic disorder (HCC)     hx of bleeding ulcers    History of bleeding ulcers 2/12/2015    Hypertension 05/03/2021    pt states well controlled on meds    Insomnia 5/21/2009    Migraine 5/21/2009    Morning headache     Obesity, morbid (HCC) 5/21/2009    Osteomyelitis ([facility]) 10/7/2019    Pain 09/2019    Chronic knee and back pain    Pubic ramus fracture ([facility]) 9/28/2019    Rheumatoid arteritis ([facility]) 09/2019    knee, feet right hand    Sleep apnea 5/21/2009    Did not tolerate cpap, does not use it    Snoring         SURGICAL HISTORY  Past Surgical History:   Procedure Laterality Date    PB TOTAL  KNEE ARTHROPLASTY Right 7/22/2020    Procedure: ARTHROPLASTY, KNEE, TOTAL;  Surgeon: Temo Pires M.D.;  Location: SURGERY [facility];  Service: Orthopedics    TENDON TRANSFER Right 1/22/2020    Procedure: RIGHT DISTAL ULNA RESECTION AND EXTENSOR TENDON TRANSFER;  Surgeon: Marine Rushing M.D.;  Location: SURGERY [facility];  Service: Orthopedics    OTHER ORTHOPEDIC SURGERY Right 2020    total right knee replacement    IRRIGATION & DEBRIDEMENT ORTHO Left 10/9/2019    Procedure: IRRIGATION AND DEBRIDEMENT, WOUND - PELVIC OSTEOMYELITIS;  Surgeon: You Olivares M.D.;  Location: SURGERY [facility];  Service: Orthopedics    OTHER SURGICAL PROCEDURE  2019    osteomelitis of pelvis cleaned    GASTRIC BYPASS LAPAROSCOPIC  2000    Lucila en y    CHOLECYSTECTOMY  2000    OTHER SURGICAL PROCEDURE      repair of broken penis        FAMILY HISTORY  Family History   Problem Relation Age of Onset    Hypertension Mother     Arthritis Mother     Depression Mother     Cancer Father         bladder    Schizophrenia Father     Cancer Maternal Grandmother         breast    Heart Disease Maternal Grandmother     Psychiatric Illness Other     Stroke Maternal Aunt     Other Neg Hx         no connective tissue disorders or known aortic disease       SOCIAL HISTORY   reports that he has been smoking cigarettes and cigars. He has been smoking an average of .25 packs per day. He has never used smokeless tobacco. He reports that he does not drink alcohol and does not use drugs.    CURRENT MEDICATIONS  Previous Medications    ADALIMUMAB (HUMIRA PEN) 40 MG/0.4ML PEN-INJECTOR KIT    INJECT 40MG SUBCUTANEOUSLY  EVERY 2 WEEKS    BETAMETHASONE DIPROPIONATE 0.05 % CREAM    Apply 1 g topically to affected areas twice daily for 14 days.    MICONAZOLE (MICOTIN) 2 % CREAM    APPLY 1 GRAM TOPICALLY TWO TIMES A DAY    MORPHINE (MS IR) 15 MG TABLET    Take 15 mg by mouth every 6 hours as needed. Indications: Pain    MORPHINE ER (MS  "CONTIN) 15 MG TAB CR TABLET    Take 15 mg by mouth every 12 hours.    NALOXONE (NARCAN) 4 MG/0.1ML LIQUID    One spray in one nostril for overdose and call 911.    OMEPRAZOLE (PRILOSEC) 40 MG DELAYED-RELEASE CAPSULE    Take 1 capsule by mouth every day.       ALLERGIES  Benadryl [altaryl], Nsaids, Phenergan [promethazine hcl], and Penicillins    PHYSICAL EXAM  /59   Pulse (!) 102   Temp (!) 38.3 °C (101 °F)   Resp 19   Ht 1.575 m (5' 2\")   Wt 88.5 kg (195 lb)   SpO2 98%      Constitutional: Ill appearing.  Confused and somnolent, no acute distress  HENT: Normocephalic, Atraumatic. Bilateral external ears normal. Nose normal.  Moist mucous membranes.  Oropharynx clear.  Eyes: Pupils are equal and reactive. Conjunctiva normal.   Neck: Supple, full range of motion  Heart: Regular rate and rhythm.  No murmurs.    Lungs: No respiratory distress, normal work of breathing. Lungs clear to auscultation bilaterally.  Abdomen Soft, no distention.  No tenderness to palpation.  Musculoskeletal: Chronic appearing wounds to the plantar surface of both feet, Atraumatic. No obvious deformities noted.  No lower extremity edema.  Skin: Severe erythema and warmth extending up the left knee to the leg. Warm, Dry.   Neurologic: Alert and oriented 1-2 with confusion. Moving all extremities spontaneously without focal deficits.  Psychiatric: Unable to assess    DIAGNOSTIC STUDIES / PROCEDURES    EKG  I have independently interpreted this EKG  Results for orders placed or performed during the hospital encounter of 23   EKG   Result Value Ref Range    Report       [facility] Emergency Dept.    Test Date:  2023  Pt Name:    LESLIE BADILLO             Department: ER  MRN:        [id number]                      Room:       [facility]  Gender:     Male                         Technician: [id number]  :        1975                   Requested By:PUNEET KEARNEY  Order #:    [id number]                    " Reading MD:    Measurements  Intervals                                Axis  Rate:       95                           P:          189  MA:         153                          QRS:        167  QRSD:       182                          T:          -4  QT:         450  QTc:        566    Interpretive Statements  Sinus or ectopic atrial rhythm  Consider dextrocardia  Compared to ECG 07/16/2020 11:54:06  Ectopic atrial rhythm now present  Sinus rhythm no longer present  First degree AV block no longer present  Intraventricular conduction delay no longer present  ST (T wave) deviation no longer present           LABS  Labs Reviewed   LACTIC ACID - Abnormal; Notable for the following components:       Result Value    Lactic Acid 2.4 (*)     All other components within normal limits   LACTIC ACID - Abnormal; Notable for the following components:    Lactic Acid 2.2 (*)     All other components within normal limits    Narrative:     Repeat if initial lactic acid result is greater than 2   CBC WITH DIFFERENTIAL - Abnormal; Notable for the following components:    RBC 4.52 (*)     Hemoglobin 13.1 (*)     Hematocrit 39.6 (*)     MCHC 33.1 (*)     Platelet Count 146 (*)     Neutrophils-Polys 86.20 (*)     Lymphocytes 1.50 (*)     Neutrophils (Absolute) 9.15 (*)     Lymphs (Absolute) 0.14 (*)     All other components within normal limits   COMP METABOLIC PANEL - Abnormal; Notable for the following components:    Sodium 129 (*)     Glucose 123 (*)     Calcium 8.0 (*)     AST(SGOT) 125 (*)     ALT(SGPT) 54 (*)     Alkaline Phosphatase 194 (*)     Albumin 2.3 (*)     Globulin 3.9 (*)     All other components within normal limits   VENOUS BLOOD GAS - Abnormal; Notable for the following components:    Venous Bg Hco3 22 (*)     All other components within normal limits   CRP QUANTITIVE (NON-CARDIAC) - Abnormal; Notable for the following components:    Stat C-Reactive Protein 18.81 (*)     All other components within normal limits  "  PROCALCITONIN - Abnormal; Notable for the following components:    Procalcitonin 98.50 (*)     All other components within normal limits   DIFFERENTIAL MANUAL - Abnormal; Notable for the following components:    Bands-Stabs 10.10 (*)     All other components within normal limits   SALICYLATE - Abnormal; Notable for the following components:    Salicylates, Quant. <1.0 (*)     All other components within normal limits   POCT GLUCOSE DEVICE RESULTS - Abnormal; Notable for the following components:    POC Glucose, Blood 46 (*)     All other components within normal limits   POCT GLUCOSE DEVICE RESULTS - Abnormal; Notable for the following components:    POC Glucose, Blood 58 (*)     All other components within normal limits   ESTIMATED GFR   CORRECTED CALCIUM   SED RATE   PLATELET ESTIMATE   MORPHOLOGY   PERIPHERAL SMEAR REVIEW   COV-2, FLU A/B, AND RSV BY PCR ([place])   ACETAMINOPHEN   DIAGNOSTIC ALCOHOL   LACTIC ACID   URINALYSIS   URINE CULTURE(NEW)   BLOOD CULTURE    Narrative:     Per Hospital Policy: Only change Specimen Src: to \"Line\" if  specified by physician order.   BLOOD CULTURE    Narrative:     Per Hospital Policy: Only change Specimen Src: to \"Line\" if  specified by physician order.   URINE DRUG SCREEN   POCT GLUCOSE DEVICE RESULTS   POCT GLUCOSE DEVICE RESULTS         RADIOLOGY  I have independently interpreted the diagnostic imaging associated with this visit and am waiting the final reading from the radiologist.   My preliminary interpretation is a follows: soft tissue swelling noted on xray, no gas  Radiologist interpretation:   US-EXTREMITY VENOUS LOWER UNILAT LEFT   Final Result      DX-TIBIA AND FIBULA LEFT   Final Result      1.  Diffuse soft tissue swelling, edema and/or cellulitis.   2.  No acute osseous abnormality.   3.  Osteopenia.      DX-FOOT-2- LEFT   Final Result      Severe osteopenia and flat foot deformity without evidence of acute displaced fracture      No definite radiographic " evidence of osteomyelitis.      CT-HEAD W/O   Final Result      Head CT without contrast within normal limits. No evidence of acute cerebral infarction, hemorrhage or mass lesion.         DX-CHEST-PORTABLE (1 VIEW)   Final Result      No acute cardiac or pulmonary abnormalities are identified.      CT-EXTREMITY, LOWER WITH LEFT    (Results Pending)         COURSE & MEDICAL DECISION MAKING    11:21 AM -  Patient was evaluated at bedside. I ordered for CT-head, DX-chest, lactic acid, CRP Quant, Procalcitonin, Sed Rate, Venous blood gas, CBC with diff, CMP, Urinalysis, and Blood culture to evaluate patient's symptoms. Will treat patient with Tylenol 1,000 mg, lactated ringers, and Zosyn 3.375 g for symptoms. I informed patient on plan of medication, labs, and scans. Patient verbalizes understanding and agreement to this plan of care.     ED Observation Status? No    12:43 PM - Patient is looking better. His repeat blood sugar is 171. Blood pressure responded to fluids.  Upon reassessment, patient is resting comfortably No new complaints at this time.  Discussed further plan of care. Patient verbalizes understanding and agreement to this plan of care.     1:37 PM - Patient's repeat blood sugar again is 58. Will start dextrose infusion and admit patient to ICU.       INITIAL ASSESSMENT, COURSE AND PLAN  Care Narrative: Patient with history of rheumatoid arthritis on Humira as well as chronic ulcerations to bilateral feet and recent MRI concerning for osteomyelitis, now presenting with fever, confusion, and cellulitis to the left lower extremity.  He is febrile on arrival with associated mild tachycardia.  Blood pressures are borderline low but responsive to fluids.  The patient is significantly confused.  CT scan of the head does not show intracranial hemorrhage.  He did have recurrent hypoglycemia that will likely could be contributing to confusion.  He required 2 dextrose boluses with persistent hypoglycemia requiring  dextrose infusion.  He also appears to be septic likely due to left lower extremity cellulitis.  He has a normal white blood cell count and only mildly elevated lactate however significant elevation of inflammatory markers.  X-rays and CT scan of the left lower extremity does not show any evidence of gas to suspect necrotizing fasciitis, obvious osteomyelitis or deep abscess formation.  He was covered with broad-spectrum antibiotics for cellulitis.    Unclear the etiology of his persistent hypoglycemia.  The patient is not on any diabetic medications however his wife does take insulin, Trulicity and metformin.  She is not aware that he has had any of these medications.  No concern for intentional ingestion however the wife does say that he has been abusing loperamide for the last 2 years due to his bipolar and chronic pain.  Ingestion work-up was unrevealing.  EKG does show significant prolongation of his QRS and QT intervals that could be related to loperamide.  This should be monitored closely on cardiac monitoring.      2:39 PM - Upon reassessment, patient is resting comfortably.  Blood pressures remain borderline low but stable following fluids.  Wife is at bedside and updated her on plan of care for admission and orthopedics consultation.      ADDITIONAL PROBLEM LIST  Problem #1: Sepsis due to left lower extremity cellulitis -given Zosyn and vancomycin due to concerns for possible underlying osteomyelitis as well, blood pressure stabilized with IV fluids, orthopedics consulted    Problem #2: Hypoglycemia -persistent despite multiple dextrose boluses, currently on dextrose infusion, unclear etiology, possibly due to sepsis    Problem #3: Acute encephalopathy -likely related to sepsis as well as hypoglycemia, CT head normal, continue to monitor    DISPOSITION AND DISCUSSIONS  I have discussed management of the patient with the following physicians and ANNIE's:      2:29 PM I discussed the patient's case and the  above findings with Dr. Armenta (Intensivist) who evaluate the patient and consult with hospitalist.  2:39 PM I discussed the patient's case and the above findings with Dr. Colindres (Ortho) who will evaluated the patient, recommending CT scan    Discussion of management with other [facility] [place] or appropriate source(s): Pharmacy discussed antibiotics and treatment of hypoglycemia          CRITICAL CARE TIME  Upon my evaluation, this patient had a high probability of imminent or life-threatening deterioration due to sepsis due to cellulitis, persistent hypoglycemia requiring dextrose infusion which required my direct attention, intervention, and personal management.     I personally provided 60 minutes of total critical care time outside of time spent on separately billable/documented procedures. Time includes: review of laboratory data, review of radiology studies, discussion with consultants, discussion with family/patient, monitoring for potential decompensation.  Interventions were performed as documented above.     DISPOSITION:  Patient will be hospitalized by Dr. Solomon Hill in critical condition.    FINAL DIAGNOSIS  1. Sepsis with encephalopathy without septic shock, due to unspecified organism (HCC)    2. Cellulitis of left lower extremity    3. Hypoglycemia        The note accurately reflects work and decisions made by me.  Angelita Alvarado M.D.  2/28/2023  6:01 PM     Adriana DREW (Dottie), am scribing for, and in the presence of, Angelita Alvarado M.D..    Electronically signed by: Adriana Phillips (Dottie), 2/28/2023    Angelita DREW M.D. personally performed the services described in this documentation, as scribed by Adriana Phillips in my presence, and it is both accurate and complete.

## 2023-02-28 NOTE — CONSULTS
2/28/2023    Time Called: 1415  Time Arrived: 1420      HPI: Richard Hubbard II is a 47 y.o. male who presents with bilateral lower extremity swelling and erythema, left worse than right.  Patient has longstanding history of bilateral planovalgus deformities.  He has had longstanding history of osteomyelitis including the foot as well as his pelvis.  Previous MRI showed what appeared to be osteomyelitis of the navicular bone on the left.  He has had chronic plantar wounds.  He presents to the emergency department today with worsening redness and lower extremity pain with intermittent confusion.  Of note patient's on biologic medication.  He presents to the ED with signs of sepsis and confusion.    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    Hemorrhagic disorder (HCC)     hx of bleeding ulcers    History of bleeding ulcers 2/12/2015    Hypertension 05/03/2021    pt states well controlled on meds    Insomnia 5/21/2009    Migraine 5/21/2009    Morning headache     Obesity, morbid (HCC) 5/21/2009    Osteomyelitis ([facility]) 10/7/2019    Pain 09/2019    Chronic knee and back pain    Pubic ramus fracture ([facility]) 9/28/2019    Rheumatoid arteritis ([facility]) 09/2019    knee, feet right hand    Sleep apnea 5/21/2009    Did not tolerate cpap, does not use it    Snoring        Past Surgical History:   Procedure Laterality Date    PB TOTAL KNEE  ARTHROPLASTY Right 7/22/2020    Procedure: ARTHROPLASTY, KNEE, TOTAL;  Surgeon: Temo Pires M.D.;  Location: SURGERY [facility];  Service: Orthopedics    TENDON TRANSFER Right 1/22/2020    Procedure: RIGHT DISTAL ULNA RESECTION AND EXTENSOR TENDON TRANSFER;  Surgeon: Marine Rushing M.D.;  Location: SURGERY [facility];  Service: Orthopedics    OTHER ORTHOPEDIC SURGERY Right 2020    total right knee replacement    IRRIGATION & DEBRIDEMENT ORTHO Left 10/9/2019    Procedure: IRRIGATION AND DEBRIDEMENT, WOUND - PELVIC OSTEOMYELITIS;  Surgeon: You Olivares M.D.;  Location: SURGERY [facility];  Service: Orthopedics    OTHER SURGICAL PROCEDURE  2019    osteomelitis of pelvis cleaned    GASTRIC BYPASS LAPAROSCOPIC  2000    Lucila en y    CHOLECYSTECTOMY  2000    OTHER SURGICAL PROCEDURE      repair of broken penis       Medications  No current facility-administered medications on file prior to encounter.     Current Outpatient Medications on File Prior to Encounter   Medication Sig Dispense Refill    Adalimumab (HUMIRA PEN) 40 MG/0.4ML Pen-injector Kit INJECT 40MG SUBCUTANEOUSLY  EVERY 2 WEEKS 2 Each 4    miconazole (MICOTIN) 2 % Cream APPLY 1 GRAM TOPICALLY TWO TIMES A DAY 60 g 1    morphine (MS IR) 15 MG tablet Take 15 mg by mouth every 6 hours as needed. Indications: Pain      morphine ER (MS CONTIN) 15 MG Tab CR tablet Take 15 mg by mouth every 12 hours.      omeprazole (PRILOSEC) 40 MG delayed-release capsule Take 1 capsule by mouth every day. 30 capsule 6    betamethasone dipropionate 0.05 % Cream Apply 1 g topically to affected areas twice daily for 14 days. 45 g 11    Naloxone (NARCAN) 4 MG/0.1ML Liquid One spray in one nostril for overdose and call 911. (Patient taking differently: Administer 1 Spray into affected nostril(S) as needed. One spray in one nostril for overdose and call 911.) 1 Package 0       Allergies  Benadryl [altaryl], Nsaids, Phenergan [promethazine hcl], and  "Penicillins    ROS  . 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   Tobacco Use    Smoking status: Every Day     Packs/day: 0.25     Types: Cigarettes, Cigars    Smokeless tobacco: Never    Tobacco comments:     occasional cigar   Vaping Use    Vaping Use: Never used   Substance and Sexual Activity    Alcohol use: No     Alcohol/week: 0.0 oz     Comment: quit 2011 ago- past ETOH abuse    Drug use: No    Sexual activity: Yes     Partners: Female     Comment: ;    Other Topics Concern     Service No    Blood Transfusions Yes    Caffeine Concern No    Occupational Exposure No    Hobby Hazards No    Sleep Concern No    Stress Concern Yes    Weight Concern Yes    Special Diet No    Back Care No    Exercise No    Bike Helmet No    Seat Belt Yes    Self-Exams Yes   Social History Narrative    , lives in [place]       Physical Exam  Vitals  BP 93/59   Pulse 99   Temp 37.8 °C (100 °F) (Oral)   Resp 16   Ht 1.575 m (5' 2\")   Wt 88.5 kg (195 lb)   SpO2 96%   General: Well Developed, Well Nourished, Age appropriate appearance  HEENT: Normocephalic, atraumatic  Psych: Normal mood and affect  Neck: Supple, nontender, no masses  Lungs: Breathing unlabored, No audible wheezing  Heart: Regular heart rate and rhythm  Abdomen: Soft, NT, ND  Neuro: Sensation grossly intact to BUE and BLE, moving all four extremities  Skin: Intact, no open wounds  Vascular: , Capillary refill <2 " seconds  MSK: Bilateral feet with significant planovalgus deformities.  There is small eschars over the plantar aspect of bilateral midfoot.  No obvious fluctuance or drainage noted.  6 significant erythema consistent with cellulitis up to the proximal tibia on the left.  Small amount on the right.  No fluctuance or bullae noted.      Radiographs:  DX-FOOT-2- LEFT   Final Result      Severe osteopenia and flat foot deformity without evidence of acute displaced fracture      No definite radiographic evidence of osteomyelitis.      CT-HEAD W/O   Final Result      Head CT without contrast within normal limits. No evidence of acute cerebral infarction, hemorrhage or mass lesion.         DX-CHEST-PORTABLE (1 VIEW)   Final Result      No acute cardiac or pulmonary abnormalities are identified.      US-EXTREMITY VENOUS LOWER UNILAT LEFT    (Results Pending)   DX-TIBIA AND FIBULA LEFT    (Results Pending)   CT-EXTREMITY, LOWER WITH LEFT    (Results Pending)       Laboratory Values  Recent Labs     02/28/23  1057   WBC 9.5   RBC 4.52*   HEMOGLOBIN 13.1*   HEMATOCRIT 39.6*   MCV 87.6   MCH 29.0   MCHC 33.1*   RDW 49.6   PLATELETCT 146*   MPV 9.1     Recent Labs     02/28/23  1057   SODIUM 129*   POTASSIUM 3.7   CHLORIDE 97   CO2 23   GLUCOSE 123*   BUN 14             Impression: 47-year-old male history of bilateral lower extremity wounds with recent onset of worsening cellulitis and signs of sepsis.  Plan for urgent CT scan.  Please keep n.p.o. for possible below-knee amputation of the left lower extremity today.  We will follow-up CT scan.    Plan:We discussed the diagnosis and findings with the patient at length.  We reviewed possible non operative and operative interventions and the risks and benefits of each of these.  he had a chance to ask questions and all of these were answered to his satisfaction. The patient chose to proceed with surgical intervention. Risks and benefits of surgery were discussed which include but  are not limited to bleeding, infection, neurovascular damage, malunion, nonunion, instability, limb length discrepancy, DVT, PE, MI, Stroke and death. They understand these risks and wish to proceed.      River Colindres MD  Orthopedic Trauma Surgery

## 2023-03-01 PROBLEM — E87.20 LACTIC ACIDOSIS: Status: RESOLVED | Noted: 2023-01-01 | Resolved: 2023-01-01

## 2023-03-01 PROBLEM — E87.8 ELECTROLYTE ABNORMALITY: Status: ACTIVE | Noted: 2023-01-01

## 2023-03-01 NOTE — ASSESSMENT & PLAN NOTE
This is Sepsis Present on admission  SIRS criteria identified on my evaluation include: Fever, with temperature greater than 101 deg F, Tachycardia, with heart rate greater than 90 BPM and Tachypnea, with respirations greater than 20 per minute  Source is left lower extremity cellulitis  Patient immune compromised chronically on Humira for severe deforming RA with history of prior osteomyelitis  Sepsis protocol initiated  Fluid resuscitation ordered per protocol  Crystalloid Fluid Administration: Fluid resuscitation ordered per standard protocol - 30 mL/kg per current or ideal body weight  IV antibiotics as appropriate for source of sepsis  Reassessment: I have reassessed the patient's hemodynamic status    CXR clear and patient on room air  Abdomen benign although N/V historically  Appears dehydrated and likely part of his soft blood pressure findings  Lactic acid initially 2.4 and improved to 2.2 after fluid bolus 2 L  Hypoglycemia likely related to sepsis evaluating for other  Check cortisol level  D10 infusion  Blood culture sent from ER  Vancomycin/clindamycin for cellulitis  Orthopedic eval  CT imaging lower extremities  Okay to IMCU with low threshold to transfer to ICU as needed

## 2023-03-01 NOTE — ASSESSMENT & PLAN NOTE
History of osteomyelitis 3 years ago felt perhaps related to Humira use in part  Rule out recurrent osteomyelitis in lower extremities, CT pending and orthopedics evaluating

## 2023-03-01 NOTE — ASSESSMENT & PLAN NOTE
Has been maintained on Humira and intermittently on prednisone although no recent steroid use  Continue to hold Humira  Cortisol level 45    tapered off hydrocortisone    3/12 Reported a severe rheumatoid arthritis plan, May consider to resume RA medications since the infection has been controlled.   3/17: He needs to follow up with rheumatology as an op

## 2023-03-01 NOTE — ASSESSMENT & PLAN NOTE
On chronic methadone for pain  Per ERP and wife possibly abusing loperamide  For now would reduce narcotic administration and reassess

## 2023-03-01 NOTE — ASSESSMENT & PLAN NOTE
Associated with previous episode of sepsis and osteomyelitis  Cardiac monitoring  Optimize electrolytes  Monitor

## 2023-03-01 NOTE — HOSPITAL COURSE
This is a 47-year-old male with a past medical history significant for rheumatoid arthritis on Humira, history of planovalgus deformities and foot ulcers with recent MRI concerning for osteomyelitis of the left navicular bone presented ER on 2/28 with altered mental status, fever at 103 and hypoglycemia, worsening erythema of the left lower extremity.  Patient was then being admitted for sepsis secondary to left lower extremity cellulitis.  Considering patient being hypotensive and hypoglycemic patient was admitted to IMCU.  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  ID has evaluated the patient during the stay in the hospital and recommended continuing antibiotics till 3/15/2023; patient has completed IV antibiotics course at this time.    PT/OT evaluate the patient medical and postacute, physiatry evaluate the patient emergency patient is a good candidate for acute rehab.      Patient does have acute on chronic pain secondary to recent BKA.  He has been seen in the past by pain specialist.  Currently patient has been taking duloxetine 60 mg p.o. daily, morphine IR 30 mg every 6 hours, MS Contin 30 twice daily, Lyrica 150 3 times daily.  Patient has been medically stable to be discharged to acute rehab.  At this time patient is not septic as she completed antibiotic course.    Interval events:  -- No acute events overnight, vital signs been stable, patient is alert, awake, answering questions appropriately.  He does have a left BKA done by Dr. Sanchez on 3/9/2023  -- Continue IV antibiotics till 3/15/2023 as per ID recommendation  -- PT/OT has evaluate the patient, recommend postacute; physiatry following and stated that patient is a candidate for inpatient rehab; awaiting insurance Auth   -- Na at 129  -- Hemoglobin 9.4, monitor, vitamin D10.1, will start TO  follow.

## 2023-03-01 NOTE — ASSESSMENT & PLAN NOTE
Wound care  CT negative for abscess  MRI concerning for osteomyelitis patient evaluated by LPS continue wound care  Continue Ancef  Discussed with ID Dr. Galdamez recommends transitioning to p.o. Zyvox on discharge through 3/9/2023 followed by p.o. amoxicillin 1 g twice daily for 3 to 4 weeks and outpatient follow-up with ID and orthopedics  MRI performed of the left knee through foot yesterday with new findings of tenosynovitis which may be the explanation for his persistent elevated temperatures and mild leukocytosis.  ID following and will cont on IV abx for now     3/10 S/p left BKA 3/9 Dr Chambers  PT and OT has evaluated and  recs post-acute   Physiatry has evaluate the patient, stated that patient is a good candidate for acute rehab

## 2023-03-01 NOTE — PROGRESS NOTES
No evidence of abscess on CT  Recommend IV abx for what appears to be cellulitis  No sx planned at this time  Patient will likely eventually require BKA at some point but not necessarily at this admission  Ok for diet from ortho pov  Please contact Ortho if clinical picture worsens    River Colindres MD  Orthopedic Trauma Surgery

## 2023-03-01 NOTE — PROGRESS NOTES
Hospital Medicine Daily Progress Note    Date of Service  3/1/2023    Chief Complaint  Richard Hubbard II is a 47 y.o. male admitted 2/28/2023 with cellulitis and sepsis    Hospital Course  Richard Hubbard II is a 47 y.o. male who presented 2/28/2023 with history of rheumatoid arthritis on Humira with history of planovalgus deformities and foot ulcers with recent MRI concerning for osteomyelitis of the left navicular bone presented to the emergency department for evaluation of confusion and fever of 103 associated with hypoglycemia and worsening redness of the lower extremities more so on the left lower extremity.  He has a history of chronic pain syndrome with narcotic dependence.  The patient is not diabetic and has not had any prior history of hypoglycemia.  According to the wife he has been in the past on prednisone for rheumatoid arthritis flares but has not taken any over the past month.  No headache no loss of consciousness.  Patient was noted to be hypotensive and hypoglycemic received dextrose infusion antibiotics and fluids resuscitation and admitted to IMCU.  Blood cultures grew group A strep    Interval Problem Update      Glucose   Blood cultures growing group A strep  Patient is afebrile  Complains of generalized pain  CT negative for abscess    I have discussed this patient's plan of care and discharge plan at IDT rounds today with Case Management, Nursing, Nursing leadership, and other members of the IDT team.    Consultants/Specialty  critical care    Code Status  Full Code    Disposition  Patient is not medically cleared for discharge.   Anticipate discharge to to home with close outpatient follow-up.  I have placed the appropriate orders for post-discharge needs.    Review of Systems  Review of Systems   Constitutional:  Positive for malaise/fatigue.   Respiratory:  Negative for shortness of breath.    Cardiovascular:  Negative for chest pain.   Gastrointestinal:  Negative  for abdominal pain and diarrhea.   Skin:         LE erythema   All other systems reviewed and are negative.     Physical Exam  Temp:  [36.7 °C (98.1 °F)-38.3 °C (101 °F)] 36.7 °C (98.1 °F)  Pulse:  [] 90  Resp:  [6-23] 14  BP: ()/(46-80) 111/80  SpO2:  [91 %-100 %] 100 %    Physical Exam  Vitals and nursing note reviewed.   Constitutional:       Appearance: He is well-developed. He is obese. He is not diaphoretic.   HENT:      Head: Normocephalic and atraumatic.      Mouth/Throat:      Pharynx: No oropharyngeal exudate.   Eyes:      General: No scleral icterus.        Right eye: No discharge.         Left eye: No discharge.      Conjunctiva/sclera: Conjunctivae normal.      Pupils: Pupils are equal, round, and reactive to light.   Neck:      Vascular: No JVD.      Trachea: No tracheal deviation.   Cardiovascular:      Rate and Rhythm: Regular rhythm. Tachycardia present.      Heart sounds: No murmur heard.    No friction rub. No gallop.   Pulmonary:      Effort: Pulmonary effort is normal. No respiratory distress.      Breath sounds: Normal breath sounds. No stridor. No wheezing.   Chest:      Chest wall: No tenderness.   Abdominal:      General: There is no distension.      Palpations: Abdomen is soft.      Tenderness: There is no abdominal tenderness. There is no rebound.   Musculoskeletal:         General: Deformity present. No tenderness.      Cervical back: Neck supple.   Skin:     General: Skin is warm and dry.      Findings: Erythema (Left lower extremity slightly improved) present.      Nails: There is no clubbing.      Comments: Ulcers plantar aspect of both feet no   drainage   Neurological:      Mental Status: He is alert and oriented to person, place, and time.      Cranial Nerves: No cranial nerve deficit.      Motor: No abnormal muscle tone.   Psychiatric:         Behavior: Behavior normal.       Fluids    Intake/Output Summary (Last 24 hours) at 3/1/2023 1024  Last data filed at 3/1/2023  0600  Gross per 24 hour   Intake 7788.3 ml   Output 2200 ml   Net 5588.3 ml       Laboratory  Recent Labs     02/28/23  1057 03/01/23  0018   WBC 9.5 7.5   RBC 4.52* 3.97*   HEMOGLOBIN 13.1* 11.4*   HEMATOCRIT 39.6* 34.3*   MCV 87.6 86.4   MCH 29.0 28.7   MCHC 33.1* 33.2*   RDW 49.6 48.8   PLATELETCT 146* 126*   MPV 9.1 9.6     Recent Labs     02/28/23  1057 03/01/23  0018   SODIUM 129* 134*   POTASSIUM 3.7 3.5*   CHLORIDE 97 104   CO2 23 20   GLUCOSE 123* 109*   BUN 14 11   CREATININE 1.13 0.71   CALCIUM 8.0* 7.0*     Recent Labs     02/28/23 2011   INR 1.88*               Imaging  CT-EXTREMITY, LOWER WITH LEFT   Final Result      1.  No evidence of acute osteomyelitis of the left lower extremity.      2.  No evidence of deep abscess formation in the subcutaneous tissues.      US-EXTREMITY VENOUS LOWER UNILAT LEFT   Final Result      DX-TIBIA AND FIBULA LEFT   Final Result      1.  Diffuse soft tissue swelling, edema and/or cellulitis.   2.  No acute osseous abnormality.   3.  Osteopenia.      DX-FOOT-2- LEFT   Final Result      Severe osteopenia and flat foot deformity without evidence of acute displaced fracture      No definite radiographic evidence of osteomyelitis.      CT-HEAD W/O   Final Result      Head CT without contrast within normal limits. No evidence of acute cerebral infarction, hemorrhage or mass lesion.         DX-CHEST-PORTABLE (1 VIEW)   Final Result      No acute cardiac or pulmonary abnormalities are identified.           Assessment/Plan  * Sepsis (HCC)- (present on admission)  Assessment & Plan  Sepsis secondary to group A strep cellulitis with bacteremia    De-escalate Zosyn to cefazolin and continue Zyvox   CT negative for abscess  Duplex negative for DVT x-rays negative for acute bony pathology  Patient evaluated by orthopedics with nonsurgical management recommended              Electrolyte abnormality  Assessment & Plan  Hypokalemia  Hypomagnesemia  Hypophosphatemia    Replete with  K-Phos and IV magnesium sulfate and monitor levels    Hyponatremia  Assessment & Plan  Likely secondary to dehydration IV fluids and monitor sodium levels      Thrombocytopenia (HCC)  Assessment & Plan  Mild monitor CBC    Encephalopathy  Assessment & Plan  Acute encephalopathy secondary to sepsis and hypoglycemia    CT head negative for acute pathology  Clinically improved    Hypoglycemia  Assessment & Plan  Etiology not clear possibly secondary to poor intake  Hypoglycemia protocol  D5 LR infusion  Monitor CBGs    Ulcer of both feet (HCC)- (present on admission)  Assessment & Plan  Wound care  CT negative for abscess    Chronic use of opiate drug for therapeutic purpose- (present on admission)  Assessment & Plan  With narcotic dependence    Resume home dose of morphine sulfate    History of immunosuppressive therapy- (present on admission)  Assessment & Plan  On Humira and intermittent steroids    Rheumatoid arthritis (HCC)- (present on admission)  Assessment & Plan  Has been maintained on Humira and intermittently on prednisone although no recent steroid use  Continue to hold Humira  Cortisol level 45 we will taper off hydrocortisone    History of bleeding ulcers  Assessment & Plan  Continue prilosec         VTE prophylaxis: enoxaparin ppx    I have performed a physical exam and reviewed and updated ROS and Plan today (3/1/2023). In review of yesterday's note (2/28/2023), there are no changes except as documented above.

## 2023-03-01 NOTE — PROGRESS NOTES
Discussed with Dr Pryor   Plan for discharge when appropriate on abx  Follow up already arranged with him to discuss options  No plans for surgery during this admission  Ortho to sign off    River Colindres MD  Orthopedic Trauma Surgery

## 2023-03-01 NOTE — ASSESSMENT & PLAN NOTE
Long history of MONICA  Query compliance  Encephalopathy may be in part profound sleep deprivation if he is not on treatment  2016 note: AHI 22, min sat 77%, corrected with CPAP 10  2021 telemetry note: Follow-up study are to be arranged-never performed?  Chronic narcotics likely exacerbating MONICA

## 2023-03-01 NOTE — ASSESSMENT & PLAN NOTE
Moderate to severe  Partly responding to IV dextrose  Likely secondary to sepsis/poor p.o. intake  Wife uses 3 hypoglycemic agents including insulin but denies that he has access to them  Other medications reviewed with clinical pharmacist, none associated hypoglycemia  Query depleted glycogen stores and wanted gluconeogenesis from sepsis    Titrate D10 infusion  Hypoglycemia protocols  Every hour fingerstick glucose for now  Initiate enteral nutrition

## 2023-03-01 NOTE — ASSESSMENT & PLAN NOTE
Sepsis secondary to LLE tenosynovitis, group A strep  bacteremia  Continue cefazolin cefazolin  Duplex negative for DVT x-rays negative for acute bony pathology  Patient evaluated by orthopedics with nonsurgical management recommended and outpatient follow-up with Dr. Pryor  Continue wound care  Evaluated by ID      S/p SHAHZAD for source control

## 2023-03-01 NOTE — ASSESSMENT & PLAN NOTE
Sleepy/disoriented  Likely related to sepsis  No meningeal signs  Hypoglycemia very likely contributing significantly to this time CT head negative  Exam nonfocal

## 2023-03-01 NOTE — PROGRESS NOTES
Ok for diet today  Will follow up CT scan  NPO at midnight for possible surgical intervention tomorrow if needed  Anticipating cellulitis, however recommend left BKA at some point based on known osteo and severe pes planus deformity      River Colindres MD  Orthopedic Trauma Surgery

## 2023-03-01 NOTE — PROGRESS NOTES
"Patient blood pressures 80/50 with MAPs 60. MD made aware via VOLT TEXT. MD order \"LR bolus 500ml, MAP greater than 60\"  "

## 2023-03-01 NOTE — ASSESSMENT & PLAN NOTE
Acute encephalopathy secondary to sepsis and hypoglycemia    CT head negative for acute pathology    Resolved

## 2023-03-01 NOTE — ASSESSMENT & PLAN NOTE
With narcotic dependence    Patient reported that he uses contin 30mg bid and morphine 30 mg every 6 hours. However, I confirmed with pharmacy, it appears that his home dose is contin 15mg bid and morphine 15 mg every 6 hours   -- Does follow with ain specialist as an op  Patient is on MS Contin and MS IR.  Currently on Lyrica 150 3 times daily along with Robaxin and duloxetine.    Psych saw the patient,psych has  recommended Remeron which has been ordered

## 2023-03-01 NOTE — ASSESSMENT & PLAN NOTE
Back on Humira injections every 2 weeks from Dr. Conway's rheumatologist  Hold Humira for now and reassess

## 2023-03-01 NOTE — CARE PLAN
The patient is Watcher - Medium risk of patient condition declining or worsening    Shift Goals  Clinical Goals: hemodynamic stability  Patient Goals: get better  Family Goals: JI    Progress made toward(s) clinical / shift goals:      Problem: Pain - Standard  Goal: Alleviation of pain or a reduction in pain to the patient’s comfort goal  Description: Target End Date:  Prior to discharge or change in level of care    Document on Vitals flowsheet    1.  Document pain using the appropriate pain scale per order or unit policy  2.  Educate and implement non-pharmacologic comfort measures (i.e. relaxation, distraction, massage, cold/heat therapy, etc.)  3.  Pain management medications as ordered  4.  Reassess pain after pain med administration per policy  5.  If opiods administered assess patient's response to pain medication is appropriate per POSS sedation scale  6.  Follow pain management plan developed in collaboration with patient and interdisciplinary team (including palliative care or pain specialists if applicable)  Outcome: Progressing     Problem: Hemodynamics  Goal: Patient's hemodynamics, fluid balance and neurologic status will be stable or improve  Description: Target End Date:  Prior to discharge or change in level of care    Document on Assessment and I/O flowsheet templates    1.  Monitor vital signs, pulse oximetry and cardiac monitor per provider order and/or policy  2.  Maintain blood pressure per provider order  3.  Hemodynamic monitoring per provider order  4.  Manage IV fluids and IV infusions  5.  Monitor intake and output  6.  Daily weights per unit policy or provider order  7.  Assess peripheral pulses and capillary refill  8.  Assess color and body temperature  9.  Position patient for maximum circulation/cardiac output  10. Monitor for signs/symptoms of excessive bleeding  11. Assess mental status, restlessness and changes in level of consciousness  12. Monitor temperature and report fever  or hypothermia to provider immediately. Consideration of targeted temperature management.  Outcome: Progressing       Patient is not progressing towards the following goals:

## 2023-03-02 NOTE — CONSULTS
LIMB PRESERVATION SERVICE CONSULT      REFERRED BY: Dr. Solomon Hill    DATE OF CONSULTATION: 3/2/2023    REASON FOR CONSULT: Bilateral plantar midfoot wounds     HISTORY OF PRESENT ILLNESS: Richard Hubbard II is a 47 y.o.  with a past medical history that includes admitted 2/28/2023 for Sepsis (HCC) [A41.9].     LPS has been consulted for bilateral plantar midfoot wounds.  The patient has had these wounds since early 2022.  He was seen at [facility] outpatient wound clinic in March 2022 briefly and then again from July 2022 through September 2022.   During this time patient was referred to [facility] and was seen by Dr. Pryor.  Patient suffers from rheumatoid arthritis and it was believed wounds were caused due to RA nodules.  Patient was seeing a podiatrist who was not comfortable removing these and referred the patient to [facility].   Bilateral MRIs were ordered in fall 2022.  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  Due to this patient did not have his bilateral LE MRIs completed at that time.  Patient completed bilateral MRIs of the feet on 1/28/2023.  Patient has not been able to follow-up at [facility] with Dr. Pryor previously.  Patient states that his children were ill last week and that he had symptoms on Saturday, 2/26/2023.  Patient then states he believes on the following Monday or Tuesday he began to have erythema and increasing edema mainly to his left LE.  Patient presented to the ED.  Patient denies fevers, chills, nausea, vomiting at this time.      Antibiotics were started on this admission.  Infectious diseases has been consulted.  Xray completed and is negative for osteomyelitis. Ortho has been consulted.  Vascular surgery not involved yet.     Patient is not diabetic.      Smoking:   reports that he has been smoking cigarettes and cigars. He has been smoking an  average of .25 packs per day. He has never used smokeless tobacco.    Alcohol:   reports no history of alcohol use.    Drug:   reports no history of drug use.      PAST MEDICAL HISTORY:   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    Hemorrhagic disorder (HCC)     hx of bleeding ulcers    History of bleeding ulcers 2/12/2015    Hypertension 05/03/2021    pt states well controlled on meds    Insomnia 5/21/2009    Migraine 5/21/2009    Morning headache     Obesity, morbid ([facility]) 5/21/2009    Osteomyelitis ([facility]) 10/7/2019    Pain 09/2019    Chronic knee and back pain    Pubic ramus fracture ([facility]) 9/28/2019    Rheumatoid arteritis ([facility]) 09/2019    knee, feet right hand    Sleep apnea 5/21/2009    Did not tolerate cpap, does not use it    Snoring         PAST SURGICAL HISTORY:   Past Surgical History:   Procedure Laterality Date    PB TOTAL KNEE ARTHROPLASTY Right 7/22/2020    Procedure: ARTHROPLASTY, KNEE, TOTAL;  Surgeon: Temo Pires M.D.;  Location: SURGERY [facility];  Service: Orthopedics    TENDON TRANSFER Right 1/22/2020    Procedure: RIGHT DISTAL ULNA RESECTION AND EXTENSOR TENDON TRANSFER;  Surgeon: Marine Rushing M.D.;  Location: SURGERY [facility];  Service: Orthopedics    OTHER ORTHOPEDIC SURGERY Right 2020    total right knee replacement    IRRIGATION & DEBRIDEMENT ORTHO Left 10/9/2019    Procedure: IRRIGATION AND  "DEBRIDEMENT, WOUND - PELVIC OSTEOMYELITIS;  Surgeon: You Olivares M.D.;  Location: SURGERY [facility];  Service: Orthopedics    OTHER SURGICAL PROCEDURE  2019    osteomelitis of pelvis cleaned    GASTRIC BYPASS LAPAROSCOPIC  2000    Lucila en y    CHOLECYSTECTOMY  2000    OTHER SURGICAL PROCEDURE      repair of broken penis       MEDICATIONS:   Scheduled Medications   Medication Dose Frequency    nicotine  7 mg Daily-0600    potassium phosphate  30 mmol Once    hydrocortisone sodium succinate PF  25 mg BID    ceFAZolin  2 g Q8HRS    morphine ER  15 mg Q12HRS    enoxaparin (LOVENOX) injection  40 mg DAILY AT 1800    omeprazole  40 mg DAILY    senna-docusate  2 Tablet BID    Pharmacy Consult Request  1 Each PHARMACY TO DOSE       ALLERGIES:    Allergies   Allergen Reactions    Benadryl [Altaryl]      \"I get agitated\"    Nsaids      Bleeding, \"I had a bleeding ulcer\"    Phenergan [Promethazine Hcl]      \"I get very agitated\"    Penicillins      \"Had some dizziness\"  Tolerated Unasyn 10/2019  Tolerated Zosyn 02/2023        FAMILY HISTORY:   Family History   Problem Relation Age of Onset    Hypertension Mother     Arthritis Mother     Depression Mother     Cancer Father         bladder    Schizophrenia Father     Cancer Maternal Grandmother         breast    Heart Disease Maternal Grandmother     Psychiatric Illness Other     Stroke Maternal Aunt     Other Neg Hx         no connective tissue disorders or known aortic disease         REVIEW OF SYSTEMS:   Constitutional: Negative for chills, fever   Respiratory: Negative for cough and shortness of breath.    Cardiovascular:Negative for chest pain, and claudication.   Gastrointestinal: Negative for constipation, diarrhea, nausea and vomiting.   Lower extremities: positive for swelling and redness  Neurological: Negative for numbness to feet and lower legs  All other systems reviewed and are negative     RESULTS:     Recent Labs     02/28/23  1057 03/01/23  0018 " 03/02/23  0227   WBC 9.5 7.5 8.6   RBC 4.52* 3.97* 4.13*   HEMOGLOBIN 13.1* 11.4* 12.1*   HEMATOCRIT 39.6* 34.3* 36.2*   MCV 87.6 86.4 87.7   MCH 29.0 28.7 29.3   MCHC 33.1* 33.2* 33.4*   RDW 49.6 48.8 49.2   PLATELETCT 146* 126* 113*   MPV 9.1 9.6 9.6     Recent Labs     02/28/23  1057 03/01/23  0018 03/02/23 0227   SODIUM 129* 134* 130*   POTASSIUM 3.7 3.5* 3.5*   CHLORIDE 97 104 101   CO2 23 20 22   GLUCOSE 123* 109* 126*   BUN 14 11 9         ESR:     Results from last 7 days   Lab Units 02/28/23  1057   SED RATE WESTERGREN 1526 mm/hour 20       CRP:       Results from last 7 days   Lab Units 02/28/23  1057   C REACTIVE PROTEIN 4596 mg/dL 18.81*         COVID-19: 2/28/2023 negative    Imaging:  CT-EXTREMITY, LOWER WITH LEFT   Final Result      1.  No evidence of acute osteomyelitis of the left lower extremity.      2.  No evidence of deep abscess formation in the subcutaneous tissues.      US-EXTREMITY VENOUS LOWER UNILAT LEFT   Final Result      DX-TIBIA AND FIBULA LEFT   Final Result      1.  Diffuse soft tissue swelling, edema and/or cellulitis.   2.  No acute osseous abnormality.   3.  Osteopenia.      DX-FOOT-2- LEFT   Final Result      Severe osteopenia and flat foot deformity without evidence of acute displaced fracture      No definite radiographic evidence of osteomyelitis.      CT-HEAD W/O   Final Result      Head CT without contrast within normal limits. No evidence of acute cerebral infarction, hemorrhage or mass lesion.         DX-CHEST-PORTABLE (1 VIEW)   Final Result      No acute cardiac or pulmonary abnormalities are identified.            MRI: 1/28/2023 left ankle  IMPRESSION:     1.  Marrow edema involving the medial aspect of the navicular with overlying soft tissue ulceration and some fluid within the overlying soft tissues possibly representing osteomyelitis.     2.  Marrow edema involving the cuboid, first through third cuneiforms and the first through third proximal metatarsals likely  representing reactive change related to also seen osteoarthritis involving the corresponding joints.     3.  Prominent hindfoot and forefoot valgus deformity with pes planus.     4.  Chronic tear of the lateral ligamentous complex and chronic sprain/partial thickness tear of the deltoid ligament.     5.  Prominent osteoarthritis of the tibiotalar and subtalar joints. There is also osteoarthritis involving the talonavicular, calcaneocuboid, navicular cuneiform and first through third tarsal/metatarsal articulations.     6.  Severe atrophy of the muscles of the lower leg and foot.     7.  Loss of the normal fat signal within the sinus tarsi which can be seen in the clinical setting of sinus tarsi syndrome.    1/28/2023 right ankle  IMPRESSION:     1.  No evidence of marrow edema, fracture or bony destructive change to suggest presence of osteomyelitis.     2.  Hindfoot and forefoot valgus deformity with pes planus.     3.  Chronic tear of the distal anterior and posterior tibiofibular, anterior talofibular and calcaneofibular ligaments.     4.  Longitudinal splitting of the tibialis posterior tendon.     5.  Severe atrophy of the muscles of the lower leg and foot.     6.  Osteoarthritis of the subtalar, talonavicular and calcaneal cuboid articulations.    Arterial studies: None    A1c:  Lab Results   Component Value Date/Time    HBA1C 4.8 10/18/2019 05:18 AM            Microbiology:  Results       Procedure Component Value Units Date/Time    BLOOD CULTURE [[id number]]     Order Status: Canceled Specimen: Blood from Peripheral     BLOOD CULTURE [[id number]]     Order Status: Canceled Specimen: Blood from Peripheral     E-Test [[id number]] Collected: 02/28/23 1110    Order Status: Completed Specimen: Other Updated: 03/01/23 1033     ETEST Sensitivity INTERIM    Narrative:      [facility] tel. [phone] 03/01/2023, 10:33, RB PERF. RESULTS CALLED TO:Nani Goldsmith RN [GRPA Strep]  Per Hospital Policy: Only change Specimen Src:  "to \"Line\" if  specified by physician order.    Blood Culture [[id number]]  (Abnormal) Collected: 02/28/23 1110    Order Status: Completed Specimen: Blood from Peripheral Updated: 03/01/23 1029     Significant Indicator POS     Source BLD     Site PERIPHERAL     Culture Result Growth detected by Bactec instrument. 02/28/2023  19:24      Beta Hemolytic Streptococcus group A    Narrative:      CALL  Swann  [facility] tel. [phone],  CALLED  [facility] tel. [phone] 02/28/2023, 19:26, RB PERF. RESULTS CALLED  TO:RN [id number]  Per Hospital Policy: Only change Specimen Src: to \"Line\" if  specified by physician order.  Right AC    Blood Culture [[id number]]  (Abnormal) Collected: 02/28/23 1111    Order Status: Completed Specimen: Blood from Peripheral Updated: 03/01/23 1025     Significant Indicator POS     Source BLD     Site PERIPHERAL     Culture Result Growth detected by Bactec instrument. 02/28/2023  20:18      Beta Hemolytic Streptococcus group A    Narrative:      CALL  Swann  [facility] tel. [phone],  CALLED  [facility] tel. [phone] 02/28/2023, 20:20, RB PERF. RESULTS CALLED TO:  RN [id number]  Per Hospital Policy: Only change Specimen Src: to \"Line\" if  specified by physician order.  Left Hand    Blood Culture [[id number]] Collected: 02/28/23 2146    Order Status: Completed Specimen: Blood from Peripheral Updated: 03/01/23 0851     Significant Indicator NEG     Source BLD     Site PERIPHERAL     Culture Result No Growth  Note: Blood cultures are incubated for 5 days and  are monitored continuously.Positive blood cultures  are called to the RN and reported as soon as  they are identified.      Narrative:      From different peripheral sites, if not done within the last  24 hours (Per Hospital Policy: Only change specimen source to  \"Line\" if specified by physician order)  Right Forearm/Arm    Blood Culture [[id number]] Collected: 02/28/23 2011    Order Status: Completed Specimen: Blood from Peripheral Updated: 03/01/23 0851     Significant " "Indicator NEG     Source BLD     Site PERIPHERAL     Culture Result No Growth  Note: Blood cultures are incubated for 5 days and  are monitored continuously.Positive blood cultures  are called to the RN and reported as soon as  they are identified.      Narrative:      From different peripheral sites, if not done within the last  24 hours (Per Hospital Policy: Only change specimen source to  \"Line\" if specified by physician order)  Left Forearm/Arm    Urinalysis [[id number]]  (Abnormal) Collected: 03/01/23 0556    Order Status: Completed Specimen: Urine Updated: 03/01/23 0640     Color Yellow     Character Clear     Specific Gravity 1.013     Ph 6.0     Glucose Negative mg/dL      Ketones Negative mg/dL      Protein Negative mg/dL      Bilirubin Negative     Urobilinogen, Urine 1.0     Nitrite Negative     Leukocyte Esterase Negative     Occult Blood Small     Micro Urine Req Microscopic    Narrative:      Indication for culture:->Evaluation for sepsis without a  clear source of infection    Urine Culture (New) [[id number]] Collected: 03/01/23 0556    Order Status: Sent Specimen: Urine Updated: 03/01/23 0617    Narrative:      Indication for culture:->Evaluation for sepsis without a  clear source of infection    MRSA By PCR (Amp) [[id number]]     Order Status: Canceled Specimen: Respirate from Nares     CoV-2, FLU A/B, and RSV by PCR (2-4 Hours Ad InfuseID) : Collect NP swab in VTM [[id number]] Collected: 02/28/23 1416    Order Status: Completed Specimen: Respirate Updated: 02/28/23 1602     Influenza virus A RNA Negative     Influenza virus B, PCR Negative     RSV, PCR Negative     SARS-CoV-2 by PCR NotDetected     Comment: [facility] providers: PLEASE REFER TO DE-ESCALATION AND RETESTING PROTOCOL  on [email or web address]    **The [employer] GeneXpert Xpress SARS-CoV-2 RT-PCR Test has been made  available for use under the Emergency Use Authorization (EUA) only.          SARS-CoV-2 Source NP Swab             PHYSICAL EXAMINATION: " "    VITAL SIGNS: /82   Pulse 80   Temp 37.9 °C (100.3 °F) (Oral)   Resp 20   Ht 1.575 m (5' 2\")   Wt 97.3 kg (214 lb 8.1 oz)   SpO2 93%   BMI 39.23 kg/m²       General Appearance:  Well developed, well nourished, in no acute distress    Lower Extremity Assessment:    Edema:   Moderate, pitting    ROM dorsi/plantarflexion:   Dorsiflexion limited, bilateral hindfoot and forefoot valgus deformity with pes planus    Sensory Assessment:  Monofilament testing with a 10 gram force: sensation: intact bilaterally  Visual Inspection: Feet with maceration, ulcers, fissures.  Pedal pulses: intact bilaterally    Sensation intact    R foot: sensed 10/10 points  L foot: sensed 10/10 points    Pulses:  R foot: palpable DP, palpable PT  L foot: palpable DP, palpable PT      Wound Assessment:    Wound(s)   location: Left plantar/medial midfoot  Full thickness, does not probe to bone  Wound characteristics: Pale red nongranular tissue, thin layer  slough  Erythema: Moderate  Drainage: Scant, sanguinous  Callus: Periwound  Odor: None  Measures: 3.5 x 1.5 x 0.3    Wound(s)   location: Right plantar/medial midfoot  Full thickness, does note probe to bone  Wound characteristics:  red nongranular tissue, thin layer slough  Erythema: Mild  Drainage: Scant, sanguinous  Callus: Periwound  Odor: None  Measures: 2.2 x 1.5 x 0.3      Wound care completed by LPS APRN.  Cleanse with wound cleanser, pat dry.  Cut Hydrofera Blue to fit wound bed, apply with writing side facing out, secure with nonadhesive foam, Hypafix tape.  To be changed every 48 by bedside RN        Wound photo:   Left plantar/medial midfoot    Right plantar/medial midfoot        PROCEDURE: Bilateral plantar midfoot wounds  -Curette used to debride wound bed.  Excisional debridement was performed to remove devitalized tissue until healthy, bleeding tissue was visualized.   Entire surface of wound, 8.55cm2 debrided.  Tissue debrided into the subcutaneous layer.  "   -Bleeding controlled with manual pressure.    -Wound care completed by wound RN, refer to flowsheet  -Patient tolerated the procedure well, without c/o pain or discomfort.         ASSESSMENT AND PLAN:   47 y.o. admitted for Sepsis (HCC) [A41.9]. Presents with bilateral plantar/medial midfoot ulcers.    -No purulence to bilateral wounds, neither wound probes to bone  -Sharp debridement necessary to promote wound healing  -Patient currently on IV cefazolin.  ID has been consulted  -Discussed need for follow-up with Dr. Pryor with patient.  Spoke about options previously discussed and patient's concerns due to lack of mobility.  Reinforced need for follow-up in light of current complications.  Patient voices agreement          Wound care:   -Wound care orders placed for nursing by LPS  -Bilateral plantar/medial midfoot wounds: Cleanse with wound cleanser, pat dry.  Cut Hydrofera Blue to fit wound bed-apply with writing side facing out, secure with nonadhesive foam, Hypafix tape.  To be changed every 48    Imaging/Labs:  -COVID-19: not completed this admission.     Vascular status:   -  Palpable pedal pulses bilaterally    Surgery:   -Ortho Dr. Colindres and Dr. Pryor.  -  no plans for surgery at this time    Antibiotics:   -currently on antibiotics managed by hospitalist   - ID consulted      Weight Bearing Status:   -Right foot: Weight bearing as tolerated  -Left foot: Weight bearing as tolerated    Offloading:   -Offloading shoe; patient states he has these from [employer]/  -Orthotic company: [employer]      PT/OT:   -consult in place  -   Lab Results   Component Value Date/Time    HBA1C 4.8 10/18/2019 05:18 AM          Smoking Cessation:   -negative effects of smoking to cardiac, pulmonary, vascular, and skin systems discussed with patient for 2 minutes. Patient not interested  in quitting at this time.             Discharge Plan:  -TBD, likely to home with supportive services.  Patient may require  further antibiotic therapy      Follow up: wound care clinic referral placed   Follow up: Dr. Pryor       D/W: pt, RN, Dr. Pryor, Dr Solomon Hill     Please note that this dictation was created using voice recognition software. I have  worked with technical experts from [facility] to optimize the interface.  I have made every reasonable attempt to correct obvious errors, but there may be errors of grammar and possibly content that I did not discover before finalizing the note.    Please contact [facility] through [employer].

## 2023-03-02 NOTE — PROGRESS NOTES
Hospital Medicine Daily Progress Note    Date of Service  3/2/2023    Chief Complaint  Richard Hubbard II is a 47 y.o. male admitted 2/28/2023 with cellulitis and sepsis    Hospital Course  Richard Hubbard II is a 47 y.o. male who presented 2/28/2023 with history of rheumatoid arthritis on Humira with history of planovalgus deformities and foot ulcers with recent MRI concerning for osteomyelitis of the left navicular bone presented to the emergency department for evaluation of confusion and fever of 103 associated with hypoglycemia and worsening redness of the lower extremities more so on the left lower extremity.  He has a history of chronic pain syndrome with narcotic dependence.  The patient is not diabetic and has not had any prior history of hypoglycemia.  According to the wife he has been in the past on prednisone for rheumatoid arthritis flares but has not taken any over the past month.  No headache no loss of consciousness.  Patient was noted to be hypotensive and hypoglycemic received dextrose infusion antibiotics and fluids resuscitation and admitted to IMCU.  Blood cultures grew group A strep    Interval Problem Update    Patient with poor appetite CBGs 70s of D5 infusion  Tmax 100.3  On room air  Discussed with LPS  Patient reports history of severe GI bleed and declines pharmacologic DVT prophylaxis  Potassium 3.5 phosphorus 1.4 magnesium 1.9  Hemoglobin 12.1  Blood cultures 2/28 group a beta-hemolytic strep  Repeat blood cultures from 2/28 remain negative      I have discussed this patient's plan of care and discharge plan at IDT rounds today with Case Management, Nursing, Nursing leadership, and other members of the IDT team.    Consultants/Specialty  critical care    Code Status  Full Code    Disposition  Patient is not medically cleared for discharge.   Anticipate discharge to to home with close outpatient follow-up.  I have placed the appropriate orders for post-discharge  needs.    Review of Systems  Review of Systems   Constitutional:  Positive for fever and malaise/fatigue.   Respiratory:  Negative for shortness of breath.    Musculoskeletal:  Positive for joint pain.   Skin:  Positive for rash.   All other systems reviewed and are negative.     Physical Exam  Temp:  [36.7 °C (98 °F)-37.9 °C (100.3 °F)] 37.9 °C (100.3 °F)  Pulse:  [80-96] 80  Resp:  [11-21] 20  BP: (101-136)/(64-89) 114/82  SpO2:  [92 %-97 %] 93 %    Physical Exam  Vitals and nursing note reviewed.   Constitutional:       General: He is not in acute distress.     Appearance: He is well-developed. He is not diaphoretic.   HENT:      Head: Normocephalic and atraumatic.      Nose: Nose normal.      Mouth/Throat:      Pharynx: No oropharyngeal exudate or posterior oropharyngeal erythema.   Eyes:      General: No scleral icterus.        Right eye: No discharge.         Left eye: No discharge.      Conjunctiva/sclera: Conjunctivae normal.      Pupils: Pupils are equal, round, and reactive to light.   Neck:      Vascular: No JVD.      Trachea: No tracheal deviation.   Cardiovascular:      Rate and Rhythm: Normal rate and regular rhythm.      Heart sounds: No murmur heard.    No friction rub. No gallop.   Pulmonary:      Effort: Pulmonary effort is normal. No respiratory distress.      Breath sounds: Normal breath sounds. No stridor. No wheezing.   Chest:      Chest wall: No tenderness.   Abdominal:      General: Bowel sounds are normal. There is no distension.      Palpations: Abdomen is soft.      Tenderness: There is no abdominal tenderness. There is no rebound.   Musculoskeletal:         General: Deformity present. No tenderness.      Cervical back: Neck supple.   Skin:     General: Skin is warm and dry.      Findings: Erythema (Right lower extremity significantly improved left lower extremity slightly improved) present.      Nails: There is no clubbing.   Neurological:      Mental Status: He is alert and oriented to  person, place, and time.      Cranial Nerves: No cranial nerve deficit.      Motor: No abnormal muscle tone.   Psychiatric:         Behavior: Behavior normal.       Fluids    Intake/Output Summary (Last 24 hours) at 3/2/2023 1003  Last data filed at 3/2/2023 0600  Gross per 24 hour   Intake 2293.9 ml   Output 1850 ml   Net 443.9 ml         Laboratory  Recent Labs     02/28/23  1057 03/01/23  0018 03/02/23 0227   WBC 9.5 7.5 8.6   RBC 4.52* 3.97* 4.13*   HEMOGLOBIN 13.1* 11.4* 12.1*   HEMATOCRIT 39.6* 34.3* 36.2*   MCV 87.6 86.4 87.7   MCH 29.0 28.7 29.3   MCHC 33.1* 33.2* 33.4*   RDW 49.6 48.8 49.2   PLATELETCT 146* 126* 113*   MPV 9.1 9.6 9.6       Recent Labs     02/28/23  1057 03/01/23 0018 03/02/23 0227   SODIUM 129* 134* 130*   POTASSIUM 3.7 3.5* 3.5*   CHLORIDE 97 104 101   CO2 23 20 22   GLUCOSE 123* 109* 126*   BUN 14 11 9   CREATININE 1.13 0.71 0.60   CALCIUM 8.0* 7.0* 7.6*       Recent Labs     02/28/23 2011   INR 1.88*                 Imaging  CT-EXTREMITY, LOWER WITH LEFT   Final Result      1.  No evidence of acute osteomyelitis of the left lower extremity.      2.  No evidence of deep abscess formation in the subcutaneous tissues.      US-EXTREMITY VENOUS LOWER UNILAT LEFT   Final Result      DX-TIBIA AND FIBULA LEFT   Final Result      1.  Diffuse soft tissue swelling, edema and/or cellulitis.   2.  No acute osseous abnormality.   3.  Osteopenia.      DX-FOOT-2- LEFT   Final Result      Severe osteopenia and flat foot deformity without evidence of acute displaced fracture      No definite radiographic evidence of osteomyelitis.      CT-HEAD W/O   Final Result      Head CT without contrast within normal limits. No evidence of acute cerebral infarction, hemorrhage or mass lesion.         DX-CHEST-PORTABLE (1 VIEW)   Final Result      No acute cardiac or pulmonary abnormalities are identified.             Assessment/Plan  * Sepsis (HCC)- (present on admission)  Assessment & Plan  Sepsis secondary to  group A strep cellulitis with bacteremia    De-escalate Zosyn to cefazolin   Zyvox discontinued as shock resolved  CT negative for abscess  Duplex negative for DVT x-rays negative for acute bony pathology  Patient evaluated by orthopedics with nonsurgical management recommended  Discussed with limb preservation service              Electrolyte abnormality  Assessment & Plan  Hypokalemia  Hypomagnesemia  Hypophosphatemia    Replete with K-Phos and IV magnesium sulfate and monitor levels    Hyponatremia  Assessment & Plan  Likely secondary to dehydration IV fluids and monitor sodium levels      Thrombocytopenia (HCC)  Assessment & Plan  Platelets 113  Monitor CBC    Encephalopathy  Assessment & Plan  Acute encephalopathy secondary to sepsis and hypoglycemia    CT head negative for acute pathology    Resolved    Hypoglycemia  Assessment & Plan  Likely secondary to poor intake  Hypoglycemia protocol  Continue D5 LR infusion  Monitor CBGs    Ulcer of both feet (HCC)- (present on admission)  Assessment & Plan  Wound care  CT negative for abscess  MRI concerning for osteomyelitis    Chronic use of opiate drug for therapeutic purpose- (present on admission)  Assessment & Plan  With narcotic dependence    Continue home dose of morphine sulfate    History of immunosuppressive therapy- (present on admission)  Assessment & Plan  On Humira and intermittent steroids    Rheumatoid arthritis (HCC)- (present on admission)  Assessment & Plan  Has been maintained on Humira and intermittently on prednisone although no recent steroid use  Continue to hold Humira  Cortisol level 45    taper off hydrocortisone    History of bleeding ulcers  Assessment & Plan  Continue prilosec  Patient declines pharmacologic DVT prophylaxis discussed RBA         VTE prophylaxis: SCDs/TEDs    I have performed a physical exam and reviewed and updated ROS and Plan today (3/2/2023). In review of yesterday's note (3/1/2023), there are no changes except as  documented above.

## 2023-03-02 NOTE — ASSESSMENT & PLAN NOTE
Hypokalemia  Hypomagnesemia  Hypophosphatemia  Hypocalcemia    Likely component of refeeding syndrome patient started on thiamine supplements  Replele electrolytes as needed

## 2023-03-02 NOTE — CARE PLAN
The patient is Watcher - Medium risk of patient condition declining or worsening    Shift Goals  Clinical Goals: improve mobility  Patient Goals: get better  Family Goals: JI    Progress made toward(s) clinical / shift goals:      Problem: Pain - Standard  Goal: Alleviation of pain or a reduction in pain to the patient’s comfort goal  Description: Target End Date:  Prior to discharge or change in level of care    Document on Vitals flowsheet    1.  Document pain using the appropriate pain scale per order or unit policy  2.  Educate and implement non-pharmacologic comfort measures (i.e. relaxation, distraction, massage, cold/heat therapy, etc.)  3.  Pain management medications as ordered  4.  Reassess pain after pain med administration per policy  5.  If opiods administered assess patient's response to pain medication is appropriate per POSS sedation scale  6.  Follow pain management plan developed in collaboration with patient and interdisciplinary team (including palliative care or pain specialists if applicable)  Outcome: Progressing     Problem: Hemodynamics  Goal: Patient's hemodynamics, fluid balance and neurologic status will be stable or improve  Description: Target End Date:  Prior to discharge or change in level of care    Document on Assessment and I/O flowsheet templates    1.  Monitor vital signs, pulse oximetry and cardiac monitor per provider order and/or policy  2.  Maintain blood pressure per provider order  3.  Hemodynamic monitoring per provider order  4.  Manage IV fluids and IV infusions  5.  Monitor intake and output  6.  Daily weights per unit policy or provider order  7.  Assess peripheral pulses and capillary refill  8.  Assess color and body temperature  9.  Position patient for maximum circulation/cardiac output  10. Monitor for signs/symptoms of excessive bleeding  11. Assess mental status, restlessness and changes in level of consciousness  12. Monitor temperature and report fever or  hypothermia to provider immediately. Consideration of targeted temperature management.  Outcome: Progressing     Problem: Fluid Volume  Goal: Fluid volume balance will be maintained  Description: Target End Date:  Prior to discharge or change in level of care    Document on I/O flowsheet    1.  Monitor intake and output as ordered  2.  Promote oral intake as appropriate  3.  Report inadequate intake or output to physician  4.  Administer IV therapy as ordered  5.  Weights per provider order  6.  Assess for signs and symptoms of bleeding  7.  Monitor for signs of fluid overload (respiratory changes, edema, weight gain, increased abdominal girth)  8.  Monitor of signs for inadequate fluid volume (poor skin turgor, dry mucous membranes)  9.  Instruct patient on adherence to fluid restrictions  Outcome: Progressing     Problem: Urinary - Renal Perfusion  Goal: Ability to achieve and maintain adequate renal perfusion and functioning will improve  Description: Target End Date:  Prior to discharge or change in level of care    Document on I/O and Assessment flowsheet    1.  Urine output will remain greater than 0.5ml/Kg/HR  2.  Monitor amount and/or characteristics of urine per order/policy. Specific gravity per order/policy  3.  Assess signs and symptoms of renal dysfunction  Outcome: Progressing       Patient is not progressing towards the following goals:

## 2023-03-03 PROBLEM — G93.40 ENCEPHALOPATHY: Status: RESOLVED | Noted: 2023-01-01 | Resolved: 2023-01-01

## 2023-03-03 NOTE — PROGRESS NOTES
1100 Informed Dr. Solomon Hill, that patient has limited ROM on his left shoulder and have rashes on bilateral arms which are new but no itching, BS:77 after turning off IV infusion.    1529 Informed Dr. Solomon Hill that patient's BS:70's, patient does not have appetite to eat but still tries. This RN recommended fluids, new orders placed.    1615 Pt refusing anticoagulants because of severe GI bleeding before. Tried calling wife regarding refusal of anticoagulants due to GI bleeding, no call back.    1624 Informed Dr. Solomon Hill that patient has ST depression on monitors right now and previous ECG but pt asymptomatic. Per MD, continue with POC.

## 2023-03-03 NOTE — CARE PLAN
The patient is Watcher - Medium risk of patient condition declining or worsening    Shift Goals  Clinical Goals: Q2 turns, pain control, monitor blood glucose  Patient Goals: get better, not loose legs  Family Goals: JI    Progress made toward(s) clinical / shift goals:    Problem: Pain - Standard  Goal: Alleviation of pain or a reduction in pain to the patient’s comfort goal  Description: Target End Date:  Prior to discharge or change in level of care    Document on Vitals flowsheet    1.  Document pain using the appropriate pain scale per order or unit policy  2.  Educate and implement non-pharmacologic comfort measures (i.e. relaxation, distraction, massage, cold/heat therapy, etc.)  3.  Pain management medications as ordered  4.  Reassess pain after pain med administration per policy  5.  If opiods administered assess patient's response to pain medication is appropriate per POSS sedation scale  6.  Follow pain management plan developed in collaboration with patient and interdisciplinary team (including palliative care or pain specialists if applicable)  Outcome: Progressing     Problem: Hemodynamics  Goal: Patient's hemodynamics, fluid balance and neurologic status will be stable or improve  Description: Target End Date:  Prior to discharge or change in level of care    Document on Assessment and I/O flowsheet templates    1.  Monitor vital signs, pulse oximetry and cardiac monitor per provider order and/or policy  2.  Maintain blood pressure per provider order  3.  Hemodynamic monitoring per provider order  4.  Manage IV fluids and IV infusions  5.  Monitor intake and output  6.  Daily weights per unit policy or provider order  7.  Assess peripheral pulses and capillary refill  8.  Assess color and body temperature  9.  Position patient for maximum circulation/cardiac output  10. Monitor for signs/symptoms of excessive bleeding  11. Assess mental status, restlessness and changes in level of consciousness  12.  Monitor temperature and report fever or hypothermia to provider immediately. Consideration of targeted temperature management.  Outcome: Progressing     Problem: Urinary - Renal Perfusion  Goal: Ability to achieve and maintain adequate renal perfusion and functioning will improve  Description: Target End Date:  Prior to discharge or change in level of care    Document on I/O and Assessment flowsheet    1.  Urine output will remain greater than 0.5ml/Kg/HR  2.  Monitor amount and/or characteristics of urine per order/policy. Specific gravity per order/policy  3.  Assess signs and symptoms of renal dysfunction  Outcome: Progressing       Patient is not progressing towards the following goals:

## 2023-03-03 NOTE — CARE PLAN
Problem: Pain - Standard  Goal: Alleviation of pain or a reduction in pain to the patient’s comfort goal  Outcome: Progressing     Problem: Hemodynamics  Goal: Patient's hemodynamics, fluid balance and neurologic status will be stable or improve  Outcome: Progressing   The patient is Stable - Low risk of patient condition declining or worsening    Shift Goals  Clinical Goals: pain control  Patient Goals: sleep  Family Goals: JI    Progress made toward(s) clinical / shift goals:  pt reports tolerable pain at 5/10

## 2023-03-03 NOTE — THERAPY
Physical Therapy   Initial Evaluation     Patient Name: Richard Hubbard II  Age:  47 y.o., Sex:  male  Medical Record #: [id number]  Today's Date: 3/3/2023     Precautions  Precautions: Fall Risk;Weight Bearing As Tolerated Left Lower Extremity;Weight Bearing As Tolerated Right Lower Extremity  Comments: Hx RA, B foot ulcers, planovalgus    Assessment  Patient is 47 y.o. male admitted with sepsis, acute encephalopathy, lactic acidosis, hyponatremia, B foot ulcers, and worsening redness & swelling of BLE.  PMH includes RA, planovalgus foot deformities and chronic foot ulcers.  Today patient presented with impaired strength, balance, activity tolerance, and increased pain.  He required mod A for supine <> sit, max A for brief stand.  Further mobility deferred for safety and due to pain.  Patient reported he has offloading shoes from previous admissions and his preferred tennis shoes that his wife can bring to hospital.  Patient is below his functional baseline, recommend continued acute skilled PT and post acute placement.      Plan    Physical Therapy Initial Treatment Plan   Treatment Plan : Bed Mobility, Equipment, Gait Training, Neuro Re-Education / Balance, Self Care / Home Evaluation, Stair Training, Therapeutic Activities, Therapeutic Exercise  Treatment Frequency: 3 Times per Week  Duration: Until Therapy Goals Met    DC Equipment Recommendations: Unable to determine at this time  Discharge Recommendations: Recommend post-acute placement for additional physical therapy services prior to discharge home     Objective     03/03/23 1127   Precautions   Precautions Fall Risk;Weight Bearing As Tolerated Left Lower Extremity;Weight Bearing As Tolerated Right Lower Extremity   Comments Hx RA, B foot ulcers, planovalgus   Pain 0 - 10 Group   Therapist Pain Assessment During Activity;Nurse Notified   Prior Living Situation   Prior Services Home-Independent   Housing / Facility [facility]   Steps Into Home 2    Equipment Owned 4-Wheel Walker   Lives with - Patient's Self Care Capacity Spouse;Child Less than 18 Years of Age   Comments 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  Resistance testing deferred due to pain   Sensation Lower Body   Comments c/o BLE numbness/tingling   Balance Assessment   Sitting Balance (Static) Fair +   Sitting Balance (Dynamic) Fair +   Standing Balance (Static) Fair -   Weight Shift Sitting Fair   Weight Shift Standing Poor   Comments static STS only   Bed Mobility    Supine to Sit Moderate Assist   Sit to Supine Moderate Assist   Scooting Moderate Assist   Gait Analysis   Gait Level Of Assist Unable to Participate   Comments Pt has offloading shoe and preferred shoes at home, wife to bring to hospital   Functional Mobility   Sit to Stand Maximal Assist   Bed, Chair, Wheelchair Transfer Unable to Participate   Mobility STS x1   Comments Deferred further mobility due to pain, lack of shoes, and for safety   Activity Tolerance   Sitting Edge of Bed 15 min   Standing <1 min   Edema / Skin Assessment   Edema / Skin  X   Comments LLE erythema, edema   Short Term Goals    Short Term Goal # 1 Pt will perform supine <> sit without bed features with SPV in 6 visits to progress functional independence   Short Term Goal # 2 Pt will perform STS/functional transfers with FWW and min A in 6 visits to progress OOB mobility   Short Term Goal # 3 Pt  will ambulate 50 ft with FWW and mod A in 6 visits to progress gait training   Short Term Goal # 4 Pt will negotiate 2 steps with mod A in 6 visits to improve home access

## 2023-03-03 NOTE — DISCHARGE PLANNING
Case Management Discharge Planning    Admission Date: 2/28/2023  GMLOS: 5  ALOS: 3    6-Clicks ADL Score:    6-Clicks Mobility Score:        Anticipated Discharge Dispo: Discharge Disposition: Discharged to home/self care (01)      Action(s) Taken: RNCM participated in IDT rounds. Patient is pending PT/OT eval for recs. Patient is also pending ID recs for abx due to wounds.     Escalations Completed: None    Medically Clear: Yes    Next Steps: HCM will continue to follow for any discharge planning assistance.    Barriers to Discharge: Medical clearance and Pending PT Evaluation    Is the patient up for discharge tomorrow: No

## 2023-03-03 NOTE — CARE PLAN
Problem: Pain - Standard  Goal: Alleviation of pain or a reduction in pain to the patient’s comfort goal  Outcome: Progressing     Problem: Skin Integrity  Goal: Skin integrity is maintained or improved  Outcome: Not Progressing  Note: New rash noted on upper left thigh, marked and spreading, MD aware.      Problem: Fall Risk  Goal: Patient will remain free from falls  Outcome: Progressing   The patient is Watcher - Medium risk of patient condition declining or worsening    Shift Goals  Clinical Goals: control pain, q2 BG  Patient Goals: get better, not loose legs  Family Goals: JI    Progress made toward(s) clinical / shift goals:  pain control, monitor rash on LLE near groin     Patient is not progressing towards the following goals:      Problem: Skin Integrity  Goal: Skin integrity is maintained or improved  Outcome: Not Progressing  Note: New rash noted on upper left thigh, marked and spreading, MD aware.

## 2023-03-03 NOTE — PROGRESS NOTES
[facility] Medicine Daily Progress Note    Date of Service  3/3/2023    Chief Complaint  Richard Hubbard II is a 47 y.o. male admitted 2/28/2023 with cellulitis and sepsis    Hospital Course  Richard Hubbard II is a 47 y.o. male who presented 2/28/2023 with history of rheumatoid arthritis on Humira with history of planovalgus deformities and foot ulcers with recent MRI concerning for osteomyelitis of the left navicular bone presented to the emergency department for evaluation of confusion and fever of 103 associated with hypoglycemia and worsening redness of the lower extremities more so on the left lower extremity.  He has a history of chronic pain syndrome with narcotic dependence.  The patient is not diabetic and has not had any prior history of hypoglycemia.  According to the wife he has been in the past on prednisone for rheumatoid arthritis flares but has not taken any over the past month.  No headache no loss of consciousness.  Patient was noted to be hypotensive and hypoglycemic received dextrose infusion antibiotics and fluids resuscitation and admitted to IMCU.  Blood cultures grew group A strep    Interval Problem Update    Afebrile  Increased rednednss rt groin  D5LR 50  Consulted ID and discussed with Dr. Galdamez  Patient is afebrile on room air        I have discussed this patient's plan of care and discharge plan at IDT rounds today with Case Management, Nursing, Nursing leadership, and other members of the IDT team.    Consultants/Specialty  critical care    Code Status  Full Code    Disposition  Patient is not medically cleared for discharge.   Anticipate discharge to to home with close outpatient follow-up.  I have placed the appropriate orders for post-discharge needs.    Review of Systems  Review of Systems   Constitutional:  Positive for malaise/fatigue. Negative for fever.   Musculoskeletal:  Positive for joint pain.   Skin:  Positive for rash.   All other systems reviewed and are  negative.     Physical Exam  Temp:  [36.2 °C (97.1 °F)-37.9 °C (100.3 °F)] 36.2 °C (97.1 °F)  Pulse:  [59-87] 69  Resp:  [16-35] 26  BP: (105-128)/(70-90) 125/84  SpO2:  [92 %-97 %] 94 %    Physical Exam  Vitals and nursing note reviewed.   Constitutional:       Appearance: He is well-developed. He is not diaphoretic.   HENT:      Head: Normocephalic and atraumatic.      Mouth/Throat:      Pharynx: No oropharyngeal exudate.   Eyes:      General: No scleral icterus.        Right eye: No discharge.         Left eye: No discharge.      Conjunctiva/sclera: Conjunctivae normal.      Pupils: Pupils are equal, round, and reactive to light.   Neck:      Vascular: No JVD.      Trachea: No tracheal deviation.   Cardiovascular:      Rate and Rhythm: Normal rate and regular rhythm.      Heart sounds: No murmur heard.    No friction rub. No gallop.   Pulmonary:      Effort: Pulmonary effort is normal. Tachypnea present. No respiratory distress.      Breath sounds: Normal breath sounds. No stridor.   Chest:      Chest wall: No tenderness.   Abdominal:      General: Bowel sounds are normal. There is no distension.      Palpations: Abdomen is soft.      Tenderness: There is no abdominal tenderness. There is no rebound.   Musculoskeletal:         General: Tenderness and deformity present.      Cervical back: Neck supple.   Skin:     General: Skin is warm and dry.      Findings: Erythema (Left lower extremity increased over left groin area) present.      Nails: There is no clubbing.      Comments: Foot ulcers with intact dressing no surrounding erythema   Neurological:      Mental Status: He is alert and oriented to person, place, and time.      Cranial Nerves: No cranial nerve deficit.      Motor: No abnormal muscle tone.   Psychiatric:         Behavior: Behavior normal.       Fluids    Intake/Output Summary (Last 24 hours) at 3/3/2023 1010  Last data filed at 3/3/2023 0900  Gross per 24 hour   Intake 955.4 ml   Output 2275 ml   Net  -1319.6 ml         Laboratory  Recent Labs     03/01/23  0018 03/02/23 0227 03/03/23  0307   WBC 7.5 8.6 9.9   RBC 3.97* 4.13* 3.83*   HEMOGLOBIN 11.4* 12.1* 11.2*   HEMATOCRIT 34.3* 36.2* 33.2*   MCV 86.4 87.7 86.7   MCH 28.7 29.3 29.2   MCHC 33.2* 33.4* 33.7   RDW 48.8 49.2 48.5   PLATELETCT 126* 113* 87*   MPV 9.6 9.6 10.9       Recent Labs     03/01/23  0018 03/02/23 0227 03/03/23  0307   SODIUM 134* 130* 131*   POTASSIUM 3.5* 3.5* 3.3*   CHLORIDE 104 101 99   CO2 20 22 23   GLUCOSE 109* 126* 96   BUN 11 9 7*   CREATININE 0.71 0.60 0.56   CALCIUM 7.0* 7.6* 7.1*       Recent Labs     02/28/23 2011   INR 1.88*                 Imaging  CT-EXTREMITY, LOWER WITH LEFT   Final Result      1.  No evidence of acute osteomyelitis of the left lower extremity.      2.  No evidence of deep abscess formation in the subcutaneous tissues.      US-EXTREMITY VENOUS LOWER UNILAT LEFT   Final Result      DX-TIBIA AND FIBULA LEFT   Final Result      1.  Diffuse soft tissue swelling, edema and/or cellulitis.   2.  No acute osseous abnormality.   3.  Osteopenia.      DX-FOOT-2- LEFT   Final Result      Severe osteopenia and flat foot deformity without evidence of acute displaced fracture      No definite radiographic evidence of osteomyelitis.      CT-HEAD W/O   Final Result      Head CT without contrast within normal limits. No evidence of acute cerebral infarction, hemorrhage or mass lesion.         DX-CHEST-PORTABLE (1 VIEW)   Final Result      No acute cardiac or pulmonary abnormalities are identified.             Assessment/Plan  * Sepsis (HCC)- (present on admission)  Assessment & Plan  Sepsis secondary to group A strep cellulitis with bacteremia    Continue cefazolin cefazolin  CT negative for abscess  Duplex negative for DVT x-rays negative for acute bony pathology  Patient evaluated by orthopedics with nonsurgical management recommended and outpatient follow-up with Dr. Pryor  Continue wound care  ID consult discussed  with Dr. Galdamez recent MRI concerning for possible osteomyelitis              Electrolyte abnormality  Assessment & Plan  Hypokalemia  Hypomagnesemia  Hypophosphatemia    Replete with K-Phos and IV magnesium sulfate and monitor levels    Hyponatremia  Assessment & Plan  Stable sodium 131 continue IV fluids and monitor      Thrombocytopenia (HCC)  Assessment & Plan  Platelet counts worse 87 K likely reactive secondary to sepsis  Monitor CBC        Hypoglycemia  Assessment & Plan  Likely secondary to poor intake  Hypoglycemia protocol  Continue D5 LR infusion  Monitor CBGs    Ulcer of both feet (HCC)- (present on admission)  Assessment & Plan  Wound care  CT negative for abscess  MRI concerning for osteomyelitis patient evaluated by LPS continue wound care  Discussed with ID  Continue Ancef    Chronic use of opiate drug for therapeutic purpose- (present on admission)  Assessment & Plan  With narcotic dependence    Continue home dose of morphine sulfate    History of immunosuppressive therapy- (present on admission)  Assessment & Plan  On Humira and intermittent steroids    Rheumatoid arthritis (HCC)- (present on admission)  Assessment & Plan  Has been maintained on Humira and intermittently on prednisone although no recent steroid use  Continue to hold Humira  Cortisol level 45    tapered off hydrocortisone    History of bleeding ulcers  Assessment & Plan  Continue prilosec  Patient declines pharmacologic DVT prophylaxis discussed RBA         VTE prophylaxis: SCDs/TEDs    I have performed a physical exam and reviewed and updated ROS and Plan today (3/3/2023). In review of yesterday's note (3/2/2023), there are no changes except as documented above.

## 2023-03-04 PROBLEM — E87.1 HYPONATREMIA: Status: RESOLVED | Noted: 2023-01-01 | Resolved: 2023-01-01

## 2023-03-04 PROBLEM — B37.2 YEAST DERMATITIS: Status: ACTIVE | Noted: 2023-01-01

## 2023-03-04 NOTE — THERAPY
"Occupational Therapy   Initial Evaluation     Patient Name: Richard Hubbard II  Age:  47 y.o., Sex:  male  Medical Record #: [id number]  Today's Date: 3/3/2023     Precautions  Precautions: (P) Fall Risk, Weight Bearing As Tolerated Right Lower Extremity, Weight Bearing As Tolerated Left Lower Extremity  Comments: (P) Hx RA, B foot ulcers, planovalgus, has offloading shoes    Assessment  Patient is 47 y.o. male who presents to acute w/ sepsis, acute encephalopathy, B foot ulcers and redness and welling of B LE. PMH includes RA, planovalgus foot deformities and chronic ulcer. Pt required mod A for supine<>Sit, CGA seated grooming, and max A to stand and for LB dressing. Pt demo'd impaired balance, functional mobility, and activity tolerance impacting functional independence. Will continue to follow.     Plan    Occupational Therapy Initial Treatment Plan   Treatment Interventions: (P) Self Care / Activities of Daily Living, Neuro Re-Education / Balance, Therapeutic Exercises, Therapeutic Activity, Adaptive Equipment  Treatment Frequency: (P) 3 Times per Week  Duration: (P) Until Therapy Goals Met    DC Equipment Recommendations: (P) Unable to determine at this time  Discharge Recommendations: (P) Recommend post-acute placement for additional occupational therapy services prior to discharge home     Subjective    \"I am always willing to give things a try\"     Objective       03/03/23 1128   Charge Group   OT Evaluation OT Evaluation Mod   Total Time Spent   OT Evaluation (Minutes) 25   Initial Contact Note    Initial Contact Note Order Received and Verified, Occupational Therapy Evaluation in Progress with Full Report to Follow.   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, spouse is APRN   Prior Level of ADL Function   Self Feeding Independent   Grooming / Hygiene Independent   Bathing Independent   Dressing Independent   Toileting Independent   Prior Level of IADL Function   Medication Management Independent   Laundry Independent   Kitchen Mobility Independent   Finances Independent   Home Management Independent   Prior Level Of Mobility Independent With Device in Community;Independent With Device in Home   Precautions   Precautions Fall Risk;Weight Bearing As Tolerated Right Lower Extremity;Weight Bearing As Tolerated Left Lower Extremity   Comments Hx RA, B foot ulcers, planovalgus, has offloading shoes   Vitals   O2 Delivery Device Room air w/o2 available   Pain 0 - 10 Group   Therapist Pain Assessment Nurse Notified;During Activity  (pain in feet and on skin)   Cognition    Cognition / Consciousness WDL   Level of Consciousness Alert   Comments very pleasent and cooperative   Active ROM Upper Body   Comments WFL   Strength Upper Body   Comments generalized weakness, appears to be due to pain   Coordination Upper Body   Coordination X   Fine Motor Coordination impaired bilaterally   Balance Assessment   Sitting Balance (Static) Fair +   Sitting Balance (Dynamic) Fair +   Standing Balance (Static) Poor -   Weight Shift Sitting Fair   Weight Shift Standing Poor   Comments w/ B UE support   Bed Mobility    Supine to Sit Moderate Assist   Sit to Supine Moderate Assist   Scooting Moderate Assist   ADL Assessment   Grooming Contact Guard Assist;Seated   Upper Body Dressing Minimal Assist   Lower Body Dressing Maximal Assist   How much help from another person does the patient currently need...   Putting on and taking off regular lower body clothing? 2   Bathing (including washing, rinsing, and drying)? 2   Toileting, which includes using a toilet, bedpan, or urinal? 3   Putting on and taking off regular  upper body clothing? 3   Taking care of personal grooming such as brushing teeth? 3   Eating meals? 4   6 Clicks Daily Activity Score 17   Functional Mobility   Sit to Stand Maximal Assist   Bed, Chair, Wheelchair Transfer Unable to Participate   Mobility STS x1   Activity Tolerance   Sitting in Chair NT   Sitting Edge of Bed 15 min   Standing 30 seconds   Comments stand limited by pain   Patient / Family Goals   Patient / Family Goal #1 To get better   Short Term Goals   Short Term Goal # 1 Pt will demo LB dressing w/ SPV   Short Term Goal # 2 Pt will demo standing grooming w/ SPV   Short Term Goal # 3 Pt will demo toilet txf w/ SPV   Education Group   Role of Occupational Therapist Patient Response Patient;Acceptance;Explanation;Demonstration;Verbal Demonstration;Action Demonstration   Occupational Therapy Initial Treatment Plan    Treatment Interventions Self Care / Activities of Daily Living;Neuro Re-Education / Balance;Therapeutic Exercises;Therapeutic Activity;Adaptive Equipment   Treatment Frequency 3 Times per Week   Duration Until Therapy Goals Met   Problem List   Problem List Decreased Active Daily Living Skills;Decreased Homemaking Skills;Decreased Functional Mobility;Decreased Activity Tolerance;Impaired Postural Control / Balance   Anticipated Discharge Equipment and Recommendations   DC Equipment Recommendations Unable to determine at this time   Discharge Recommendations Recommend post-acute placement for additional occupational therapy services prior to discharge home   Interdisciplinary Plan of Care Collaboration   IDT Collaboration with  Nursing;Physical Therapist   Patient Position at End of Therapy In Bed;Call Light within Reach;Tray Table within Reach;Phone within Reach   Collaboration Comments report given   Session Information   Date / Session Number  3/3, 1 (1/3, 3/9)

## 2023-03-04 NOTE — PROGRESS NOTES
Hypoglycemia Intervention    Hypoglycemia protocol intervention:  Blood glucose 66 at 0115.  Intervention: 8 oz of fruit juice   Repeat blood glucose 73 at 0130.  Intervention: 8 oz of fruit juice   Additional interventions needed:   Carbohydrate snack given, BG 95 at 0150, Bg100  @0205  Dr. Grove notified of the above at 0150.

## 2023-03-04 NOTE — CONSULTS
[facility] – [facility] of the [facility] INFECTIOUS DISEASES INPATIENT CONSULT NOTE     Date of Service: 3/4/2023    Consult Requested By: Ambrosio Hill M.D.    Reason for Consultation: Osteomyelitis    Chief Complaint: Foot infection    History of Present Illness:     Richard Hubbard II is a 47 y.o. male admitted 2/28/2023.  Patient with rheumatoid arthritis with rheumatoid nodules on Humira, history of planovalgus deformities and chronic bilateral plantar foot ulcers since early 2022.  Patient was being evaluated at the [facility], plan was for outpatient MRI but this was delayed, eventually obtained on 1/28/2023, found to have marrow edema involving the navicular,  cuboid, first through third cuneiforms, 1st-3rd proximal metatarsals, concerning for osteomyelitis.  He was advised reconstructive surgeries but this has been delayed due to social issues.  About a week ago, patient began to experience worsening redness and swelling of the left lower extremity.  He presented to the ER.  Tmax is 101, no leukocytosis.  Blood cultures x2 from 2/28 positive for group A Strep.  Blood cultures from later that same day negative thus far.  CT scan with no obvious abscess.  Orthopedics outpatient, no plans for surgery.    Patient notes that the left leg pain and swelling have improved today significantly.  He notes severe tinea cruris.    Review of Systems:  All other systems reviewed and are negative expect as noted in HPI    Past Medical History:   Diagnosis Date    ADD (attention deficit disorder)     Alcohol abuse     Allergic rhinitis 5/21/2009    Anemia 09/2019    Anxiety 5/21/2009    Apnea, sleep     Arrhythmia     afib when hospitaized for infection    Back pain 5/21/2009    Bipolar disorder (HCC)     Bleeding ulcer 5/21/2009    Bleeding ulcer 5/21/2009    History of anemia-now on nexium     Bowel habit changes     constipation related to narcotics    Daytime sleepiness     Depression 5/21/2009    Eczema 5/21/2009    GERD (gastroesophageal reflux  disease)     Heart burn     on meds    Heart murmur     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    Hemorrhagic disorder (HCC)     hx of bleeding ulcers    History of bleeding ulcers 2/12/2015    Hypertension 05/03/2021    pt states well controlled on meds    Insomnia 5/21/2009    Migraine 5/21/2009    Morning headache     Obesity, morbid ([facility]) 5/21/2009    Osteomyelitis ([facility]) 10/7/2019    Pain 09/2019    Chronic knee and back pain    Pubic ramus fracture ([facility]) 9/28/2019    Rheumatoid arteritis ([facility]) 09/2019    knee, feet right hand    Sleep apnea 5/21/2009    Did not tolerate cpap, does not use it    Snoring        Past Surgical History:   Procedure Laterality Date    PB TOTAL KNEE ARTHROPLASTY Right 7/22/2020    Procedure: ARTHROPLASTY, KNEE, TOTAL;  Surgeon: Temo Pires M.D.;  Location: SURGERY [facility];  Service: Orthopedics    TENDON TRANSFER Right 1/22/2020    Procedure: RIGHT DISTAL ULNA RESECTION AND EXTENSOR TENDON TRANSFER;  Surgeon: Marine Rushing M.D.;  Location: SURGERY [facility];  Service: Orthopedics    OTHER ORTHOPEDIC SURGERY Right 2020    total right knee replacement    IRRIGATION & DEBRIDEMENT ORTHO Left 10/9/2019    Procedure: IRRIGATION AND DEBRIDEMENT, WOUND - PELVIC OSTEOMYELITIS;  Surgeon: You Olivares M.D.;  Location: SURGERY [facility];  Service: Orthopedics    OTHER SURGICAL PROCEDURE  2019    osteomelitis of pelvis cleaned    GASTRIC BYPASS LAPAROSCOPIC  2000    Lucila en y    CHOLECYSTECTOMY  2000    OTHER SURGICAL PROCEDURE      repair of broken penis       Family History   Problem Relation Age of Onset    Hypertension Mother     Arthritis Mother     Depression Mother     Cancer Father         bladder    Schizophrenia Father     Cancer Maternal Grandmother         breast    Heart Disease Maternal Grandmother     Psychiatric Illness Other     Stroke Maternal Aunt     Other Neg Hx         no connective tissue disorders or known aortic disease       Social  History     Socioeconomic History    Marital status:      Spouse name: Not on file    Number of children: Not on file    Years of education: Not on file    Highest education level: Bachelor's degree (e.g., BA, AB, BS)   Occupational History    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   Tobacco Use    Smoking status: Every Day     Packs/day: 0.25     Types: Cigarettes, Cigars    Smokeless tobacco: Never    Tobacco comments:     occasional cigar   Vaping Use    Vaping Use: Never used   Substance and Sexual Activity    Alcohol use: No     Alcohol/week: 0.0 oz     Comment: quit 2011 ago- past ETOH abuse    Drug use: No    Sexual activity: Yes     Partners: Female     Comment: ;    Other Topics Concern     Service No    Blood Transfusions Yes    Caffeine Concern No    Occupational Exposure No    Hobby Hazards No    Sleep Concern No    Stress Concern Yes    Weight Concern Yes    Special Diet No    Back Care No    Exercise No    Bike Helmet No    Seat Belt Yes    Self-Exams Yes   Social History Narrative    , lives in [place]     Social Determinants of Health     Financial Resource Strain: Low Risk     Difficulty of Paying Living Expenses: Not hard at all   Food Insecurity: No Food Insecurity    Worried About Running Out of Food in the Last Year: Never true    Ran Out of Food in the Last Year: Never true   Transportation Needs: No Transportation Needs    Lack of Transportation (Medical): No    Lack of Transportation (Non-Medical): No   Physical Activity: Inactive    Days of Exercise per Week: 0 days    Minutes of Exercise per Session: 0 min   Stress: Stress Concern Present    Feeling of Stress : To some extent   Social Connections: Socially Integrated    Frequency of Communication with Friends and Family: More than three times a week    Frequency of Social Gatherings with Friends and Family: Once a week    Attends Sabianist Services: More than 4 times per year    Active Member of Clubs or Organizations: Yes  "   Attends Club or Organization Meetings: More than 4 times per year    Marital Status:    Intimate Partner Violence: Not on file   Housing Stability: Low Risk     Unable to Pay for Housing in the Last Year: No    Number of Places Lived in the Last Year: 1    Unstable Housing in the Last Year: No       Allergies   Allergen Reactions    Benadryl [Altaryl]      \"I get agitated\"    Nsaids      Bleeding, \"I had a bleeding ulcer\"    Phenergan [Promethazine Hcl]      \"I get very agitated\"    Penicillins      \"Had some dizziness\"  Tolerated Unasyn 10/2019  Tolerated Zosyn 02/2023       Medications:    Current Facility-Administered Medications:     fluconazole (DIFLUCAN) tablet 200 mg, 200 mg, Oral, DAILY, Torsten Grove M.D.    potassium phosphate IVPB 30 mmol in 500 mL D5W (premix), 30 mmol, Intravenous, Once, Ambrosio Hill M.D.    calcium citrate (CALCITRATE) tablet 950 mg, 950 mg, Oral, DAILY, Ambrosio Hill M.D.    zolpidem (AMBIEN) tablet 5 mg, 5 mg, Oral, HS PRN, Ambrosio Hill M.D.    nicotine (NICODERM) 7 MG/24HR 7 mg, 7 mg, Transdermal, Daily-0600, Torsten Grove M.D., 7 mg at 03/04/23 0520    D5LR infusion, , Intravenous, Continuous, Torsten Grove M.D., Last Rate: 83 mL/hr at 03/04/23 0353, Rate Change at 03/04/23 0353    ceFAZolin (Ancef) 2 g in  mL IVPB, 2 g, Intravenous, Q8HRS, Ambrosio Hill M.D., Stopped at 03/04/23 0549    morphine (MS IR) tablet 15 mg, 15 mg, Oral, Q6HRS PRN, Ambrosio Hill M.D., 15 mg at 03/03/23 2128    morphine ER (MS CONTIN) tablet 15 mg, 15 mg, Oral, Q12HRS, Ambrosio Hill M.D., 15 mg at 03/04/23 0519    omeprazole (PRILOSEC) capsule 40 mg, 40 mg, Oral, DAILY, Ambrosio Hill M.D., 40 mg at 03/04/23 0519    senna-docusate (PERICOLACE or SENOKOT S) 8.6-50 MG per tablet 2 Tablet, 2 Tablet, Oral, BID, 2 Tablet at 03/02/23 0524 **AND** polyethylene glycol/lytes (MIRALAX) PACKET 1 Packet, 1 Packet, Oral, QDAY PRN **AND** " "magnesium hydroxide (MILK OF MAGNESIA) suspension 30 mL, 30 mL, Oral, QDAY PRN **AND** bisacodyl (DULCOLAX) suppository 10 mg, 10 mg, Rectal, QDAY PRN, Ambrosio Hill M.D.    lactated ringers infusion (BOLUS), 500 mL, Intravenous, Once PRN, Ambrosio Hill M.D.    acetaminophen (Tylenol) tablet 650 mg, 650 mg, Oral, Q6HRS PRN, Ambrosio Hill M.D., 650 mg at 03/02/23 1741    Notify provider if pain remains uncontrolled, , , CONTINUOUS **AND** Use the Numeric Rating Scale (NRS), Sy-Baker Faces (WBF), or FLACC on regular floors and Critical-Care Pain Observation Tool (CPOT) on ICUs/Trauma to assess pain, , , CONTINUOUS **AND** Pulse Ox, , , CONTINUOUS **AND** Pharmacy Consult Request ...Pain Management Review 1 Each, 1 Each, Other, PHARMACY TO DOSE **AND** If patient difficult to arouse and/or has respiratory depression (respiratory rate of 10 or less), stop any opiates that are currently infusing and call a Rapid Response., , , CONTINUOUS, Ambrosio Hill M.D.    [DISCONTINUED] oxyCODONE immediate-release (ROXICODONE) tablet 5 mg, 5 mg, Oral, Q3HRS PRN **OR** [DISCONTINUED] oxyCODONE immediate release (ROXICODONE) tablet 10 mg, 10 mg, Oral, Q3HRS PRN **OR** HYDROmorphone (Dilaudid) injection 0.5 mg, 0.5 mg, Intravenous, Q3HRS PRN, Ambrosio Hill M.D., 0.5 mg at 03/04/23 0148    Notify MD and PharmD if FSBG level is less than or equal to 70 mg/dL or patient is showing signs/symptoms of hypoglycemia (tachycardia, palpitations, diaphoresis, clammy, tremulousness, nausea, confused), , , Once **AND** Administer 20 grams of glucose (approximately 8 ounces of fruit juice) every 15 minutes PRN FSBS less than 70 mg/dL, , , PRN **AND** dextrose 10 % BOLUS 25 g, 25 g, Intravenous, Q15 MIN PRN, Ambrosio Hill M.D.    Physical Exam:   Vital Signs: /71   Pulse 85   Temp 36.4 °C (97.5 °F) (Oral)   Resp (!) 25   Ht 1.575 m (5' 2\")   Wt 99.4 kg (219 lb 2.2 oz)   SpO2 93%   BMI 40.08 " kg/m²   Temp  Av.2 °C (98.9 °F)  Min: 36.2 °C (97.1 °F)  Max: 38.3 °C (101 °F)  Vital signs reviewed  Constitutional: Patient is oriented to person, place, and time. Appears ill but in no acute distress  Head: Atraumatic, normocephalic  Eyes: Conjunctivae normal, EOM intact  Mouth/Throat: Lips without lesions  Neck: Neck supple. No masses/lymphadenopathy  Cardiovascular: Normal rate, regular rhythm, normal S1S2 and intact distal pulses. No murmur, gallop, or friction rub. No pedal edema.  Pulmonary/Chest: No respiratory distress. Unlabored respiratory effort, lungs clear to auscultation. No wheezes or rales.   Abdominal: Soft, non tender. BS + x 4. No masses or hepatosplenomegaly.  Left-sided tinea cruris  Musculoskeletal: No joint tenderness, swelling, erythema, or restriction of motion noted.  Bilateral plantar chronic wounds, left foot swelling without significant erythema.  Left leg with area of cellulitis but  Neurological: Alert and oriented to person, place, and time. No gross cranial nerve deficit. No focal neural deficit noted  Skin: In addition to above, small papular erythematous lesions, patient states the eruptions appear quite frequently during RA flares  Psychiatric: Normal mood and affect. Behavior is normal.     LABS:  Recent Labs     23   WBC 8.6 9.9 10.7      Recent Labs     23   HEMOGLOBIN 12.1* 11.2* 11.8*   HEMATOCRIT 36.2* 33.2* 35.1*   MCV 87.7 86.7 85.2   MCH 29.3 29.2 28.6   MACROCYTOSIS  --   --  2+*   ANISOCYTOSIS 1+  --  2+*   PLATELETCT 113* 87* 85*       Recent Labs     23   SODIUM 130* 131* 136  136   POTASSIUM 3.5* 3.3* 3.2*  3.2*   CHLORIDE 101 99 102  102   CO2    CREATININE 0.60 0.56 0.59  0.59        Recent Labs     23  1820 23/23  0319   ALBUMIN 2.0* 1.8* 2.0*        MICRO:  No results found for: BLOODCULTU,  CHIQUIS FRANCO     Latest pertinent labs were reviewed    IMAGING STUDIES:  As above    Hospital Course/Assessment:   Richard Hubbard II is a 47 y.o. male patient with rheumatoid arthritis with rheumatoid nodules on Humira, history of planovalgus deformities and chronic bilateral plantar foot ulcers since early 2022.  Patient was being evaluated at the [facility], plan was for outpatient MRI but this was delayed, eventually obtained on 1/28/2023, found to have marrow edema involving the navicular,  cuboid, first through third cuneiforms, 1st-3rd proximal metatarsals, concerning for osteomyelitis.  He was advised reconstructive surgeries but this has been delayed due to social issues.  About a week ago, patient began to experience worsening redness and swelling of the left lower extremity.  He presented to the ER.  Tmax is 101, no leukocytosis.  Blood cultures x2 from 2/28 positive for group A Strep.  Blood cultures from later that same day negative thus far.  CT scan with no obvious abscess.  Orthopedics outpatient, no plans for surgery while inpatient.    Pertinent Diagnoses:  Group A Strep bacteremia  Left leg cellulitis, improved  Left foot chronic osteomyelitis  Left-sided tinea cruris  Rheumatoid arthritis  Chronic foot deformities, chronic bilateral foot ulcers  Immunosuppressed status    Plan:   -Continue IV Ancef 2 g every 8 hours  -Follow-up with blood cultures x2 from 2/28, no growth till date  -Patient has follow-up with Dr. Pryor in 1-2 weeks.  If cellulitis improvement continues, anticipate completion of sepsis treatment with p.o. linezolid 600 mg twice daily through 3/9/2023, followed by switch to p.o. amoxicillin 1 g twice daily for suppression in order to prevent recurrent sepsis while awaiting surgical intervention of the foot.  Definitive antibiotic therapy currently has not been established    Disposition: Anticipate no ID specific disposition needs  Need for PICC line: No    Plan was  discussed with the primary team, Dr. Solomon Arreola    ID clinic follow-up in 2 weeks.  Okay to schedule with NP Nayeli Galdamez M.D.    Please note that this dictation was created using voice recognition software. I have worked with technical experts from [facility] to optimize the interface.  I have made every reasonable attempt to correct obvious errors, but there may be errors of grammar and possibly content that I did not discover before finalizing the note.

## 2023-03-04 NOTE — CARE PLAN
The patient is Watcher - Medium risk of patient condition declining or worsening    Shift Goals  Clinical Goals: control pain, q2 BG  Patient Goals: get better, not loose legs  Family Goals: JI    Progress made toward(s) clinical / shift goals:      Problem: Pain - Standard  Goal: Alleviation of pain or a reduction in pain to the patient’s comfort goal  Description: Target End Date:  Prior to discharge or change in level of care    Document on Vitals flowsheet    1.  Document pain using the appropriate pain scale per order or unit policy  2.  Educate and implement non-pharmacologic comfort measures (i.e. relaxation, distraction, massage, cold/heat therapy, etc.)  3.  Pain management medications as ordered  4.  Reassess pain after pain med administration per policy  5.  If opiods administered assess patient's response to pain medication is appropriate per POSS sedation scale  6.  Follow pain management plan developed in collaboration with patient and interdisciplinary team (including palliative care or pain specialists if applicable)  Outcome: Progressing     Problem: Hemodynamics  Goal: Patient's hemodynamics, fluid balance and neurologic status will be stable or improve  Description: Target End Date:  Prior to discharge or change in level of care    Document on Assessment and I/O flowsheet templates    1.  Monitor vital signs, pulse oximetry and cardiac monitor per provider order and/or policy  2.  Maintain blood pressure per provider order  3.  Hemodynamic monitoring per provider order  4.  Manage IV fluids and IV infusions  5.  Monitor intake and output  6.  Daily weights per unit policy or provider order  7.  Assess peripheral pulses and capillary refill  8.  Assess color and body temperature  9.  Position patient for maximum circulation/cardiac output  10. Monitor for signs/symptoms of excessive bleeding  11. Assess mental status, restlessness and changes in level of consciousness  12. Monitor temperature and  report fever or hypothermia to provider immediately. Consideration of targeted temperature management.  Outcome: Progressing     Problem: Respiratory  Goal: Patient will achieve/maintain optimum respiratory ventilation and gas exchange  Description: Target End Date:  Prior to discharge or change in level of care    Document on Assessment flowsheet    1.  Assess and monitor rate, rhythm, depth and effort of respiration  2.  Breath sounds assessed qshift and/or as needed  3.  Assess O2 saturation, administer/titrate oxygen as ordered  4.  Position patient for maximum ventilatory efficiency  5.  Turn, cough, and deep breath with splinting to improve effectiveness  6.  Collaborate with RT to administer medication/treatments per order  7.  Encourage use of incentive spirometer and encourage patient to cough after use and utilize splinting techniques if applicable  8.  Airway suctioning  9.  Monitor sputum production for changes in color, consistency and frequency  10. Perform frequent oral hygiene  11. Alternate physical activity with rest periods  Outcome: Progressing       Patient is not progressing towards the following goals:

## 2023-03-04 NOTE — PROGRESS NOTES
Hospital Medicine Daily Progress Note    Date of Service  3/4/2023    Chief Complaint  Richard Hubbard II is a 47 y.o. male admitted 2/28/2023 with cellulitis and sepsis    Hospital Course  Richard Hubbard II is a 47 y.o. male who presented 2/28/2023 with history of rheumatoid arthritis on Humira with history of planovalgus deformities and foot ulcers with recent MRI concerning for osteomyelitis of the left navicular bone presented to the emergency department for evaluation of confusion and fever of 103 associated with hypoglycemia and worsening redness of the lower extremities more so on the left lower extremity.  He has a history of chronic pain syndrome with narcotic dependence.  The patient is not diabetic and has not had any prior history of hypoglycemia.  According to the wife he has been in the past on prednisone for rheumatoid arthritis flares but has not taken any over the past month.  No headache no loss of consciousness.  Patient was noted to be hypotensive and hypoglycemic received dextrose infusion antibiotics and fluids resuscitation and admitted to IMCU.  Blood cultures grew group A strep    Interval Problem Update    Tmax 101.1  's   Improved p.o. intake I have ordered supplements  Started on Diflucan and clotrimazole for perineal rash  Discussed with Dr. Galdamez  he recommends transitioning to p.o. linezolid on discharge through 3/9/2023 followed by p.o. amoxicillin 1 g twice daily for 3 to 4 weeks and outpatient follow-up with ID and orthopedics          I have discussed this patient's plan of care and discharge plan at IDT rounds today with Case Management, Nursing, Nursing leadership, and other members of the IDT team.    Consultants/Specialty  critical care    Code Status  Full Code    Disposition  Patient is not medically cleared for discharge.   Anticipate discharge to to home with close outpatient follow-up.  I have placed the appropriate orders for post-discharge  needs.    Review of Systems  Review of Systems   Constitutional:  Positive for malaise/fatigue. Negative for fever.   Respiratory:  Negative for shortness of breath.    Cardiovascular:  Negative for chest pain.   Musculoskeletal:  Positive for joint pain.   Skin:  Positive for rash.   All other systems reviewed and are negative.     Physical Exam  Temp:  [36.2 °C (97.2 °F)-38.4 °C (101.1 °F)] 38.4 °C (101.1 °F)  Pulse:  [65-95] 88  Resp:  [12-31] 31  BP: ()/(55-81) 103/70  SpO2:  [90 %-96 %] 95 %    Physical Exam  Vitals and nursing note reviewed.   Constitutional:       Appearance: He is well-developed. He is not diaphoretic.   HENT:      Head: Normocephalic and atraumatic.      Mouth/Throat:      Pharynx: No oropharyngeal exudate.   Eyes:      General: No scleral icterus.        Right eye: No discharge.         Left eye: No discharge.      Pupils: Pupils are equal, round, and reactive to light.   Neck:      Vascular: No JVD.      Trachea: No tracheal deviation.   Cardiovascular:      Rate and Rhythm: Normal rate and regular rhythm.      Heart sounds: No murmur heard.    No friction rub. No gallop.   Pulmonary:      Effort: Pulmonary effort is normal. No respiratory distress.      Breath sounds: Normal breath sounds. No stridor. No wheezing.   Chest:      Chest wall: No tenderness.   Abdominal:      General: There is no distension.      Palpations: Abdomen is soft.      Tenderness: There is no abdominal tenderness. There is no rebound.      Hernia: A hernia (Soft and reducible) is present.   Musculoskeletal:         General: Swelling and tenderness present.      Cervical back: Neck supple.   Skin:     General: Skin is warm.      Findings: Erythema present.      Nails: There is no clubbing.      Comments: Left scrotal perineal rash    Left lower extremity erythema improved   Neurological:      Mental Status: He is alert and oriented to person, place, and time.      Cranial Nerves: No cranial nerve deficit.       Motor: No abnormal muscle tone.   Psychiatric:         Behavior: Behavior normal.       Fluids    Intake/Output Summary (Last 24 hours) at 3/4/2023 0931  Last data filed at 3/4/2023 0824  Gross per 24 hour   Intake 1311.55 ml   Output 4725 ml   Net -3413.45 ml         Laboratory  Recent Labs     03/02/23 0227 03/03/23 0307 03/04/23 0319   WBC 8.6 9.9 10.7   RBC 4.13* 3.83* 4.12*   HEMOGLOBIN 12.1* 11.2* 11.8*   HEMATOCRIT 36.2* 33.2* 35.1*   MCV 87.7 86.7 85.2   MCH 29.3 29.2 28.6   MCHC 33.4* 33.7 33.6*   RDW 49.2 48.5 47.8   PLATELETCT 113* 87* 85*   MPV 9.6 10.9 10.7       Recent Labs     03/02/23 0227 03/03/23 0307 03/04/23 0319   SODIUM 130* 131* 136  136   POTASSIUM 3.5* 3.3* 3.2*  3.2*   CHLORIDE 101 99 102  102   CO2 22 23 23  23   GLUCOSE 126* 96 86  86   BUN 9 7* 6*  6*   CREATININE 0.60 0.56 0.59  0.59   CALCIUM 7.6* 7.1* 6.8*  6.8*                       Imaging  CT-EXTREMITY, LOWER WITH LEFT   Final Result      1.  No evidence of acute osteomyelitis of the left lower extremity.      2.  No evidence of deep abscess formation in the subcutaneous tissues.      US-EXTREMITY VENOUS LOWER UNILAT LEFT   Final Result      DX-TIBIA AND FIBULA LEFT   Final Result      1.  Diffuse soft tissue swelling, edema and/or cellulitis.   2.  No acute osseous abnormality.   3.  Osteopenia.      DX-FOOT-2- LEFT   Final Result      Severe osteopenia and flat foot deformity without evidence of acute displaced fracture      No definite radiographic evidence of osteomyelitis.      CT-HEAD W/O   Final Result      Head CT without contrast within normal limits. No evidence of acute cerebral infarction, hemorrhage or mass lesion.         DX-CHEST-PORTABLE (1 VIEW)   Final Result      No acute cardiac or pulmonary abnormalities are identified.             Assessment/Plan  * Sepsis (HCC)- (present on admission)  Assessment & Plan  Sepsis secondary to group A strep cellulitis with bacteremia    Continue cefazolin  cefazolin  CT negative for abscess  Duplex negative for DVT x-rays negative for acute bony pathology  Patient evaluated by orthopedics with nonsurgical management recommended and outpatient follow-up with Dr. Pryor  Continue wound care  Evaluated by ID              Yeast dermatitis  Assessment & Plan  Started on Diflucan and topical clotrimazole  Skin care    Electrolyte abnormality  Assessment & Plan  Hypokalemia  Hypomagnesemia  Hypophosphatemia  Hypocalcemia    Likely component of refeeding syndrome patient started on thiamine supplements    Replete with K-Phos and IV magnesium sulfate   Start calcium citrate supplements   Monitor electrolytes    Thrombocytopenia (HCC)  Assessment & Plan  Platelet counts 85K likely reactive secondary to sepsis  Monitor CBC        Hypoglycemia  Assessment & Plan  Likely secondary to poor intake  Hypoglycemia protocol  Continue D5 LR infusion overnight and if p.o. intake remains stable will DC in a.m.  Monitor CBGs    Ulcer of both feet (HCC)- (present on admission)  Assessment & Plan  Wound care  CT negative for abscess  MRI concerning for osteomyelitis patient evaluated by LPS continue wound care  Continue Ancef  Discussed with ID Dr. Galdamez recommends transitioning to p.o. Zyvox on discharge through 3/9/2023 followed by p.o. amoxicillin 1 g twice daily for 3 to 4 weeks and outpatient follow-up with ID and orthopedics    Chronic use of opiate drug for therapeutic purpose- (present on admission)  Assessment & Plan  With narcotic dependence    Continue home dose of morphine sulfate    History of immunosuppressive therapy- (present on admission)  Assessment & Plan  On Humira and intermittent steroids    Rheumatoid arthritis (HCC)- (present on admission)  Assessment & Plan  Has been maintained on Humira and intermittently on prednisone although no recent steroid use  Continue to hold Humira  Cortisol level 45    tapered off hydrocortisone    History of bleeding  ulcers  Assessment & Plan  Continue prilosec  Patient declines pharmacologic DVT prophylaxis discussed RBA         VTE prophylaxis: SCDs/TEDs    I have performed a physical exam and reviewed and updated ROS and Plan today (3/4/2023). In review of yesterday's note (3/3/2023), there are no changes except as documented above.

## 2023-03-04 NOTE — CARE PLAN
"The patient is Watcher - Medium risk of patient condition declining or worsening    Shift Goals  Clinical Goals: pain control, increase mobility, ID bedside consult  Patient Goals: \"whatever you say\"  Family Goals: JI    Progress made toward(s) clinical / shift goals: Pt O2 is being weaned, pain meds administered appropriately, will work w/ PT/OT to increase mobility and independence    Patient is not progressing towards the following goals: Pt needs work to regain physical function to baseline      Problem: Self Care  Goal: Patient will have the ability to perform ADLs independently or with assistance (bathe, groom, dress, toilet and feed)  3/4/2023 1116 by Angela Hernandes, R.N.  Outcome: Not Progressing  3/4/2023 1115 by Angela Hernandes, R.N.  Outcome: Not Progressing     Problem: Mobility  Goal: Patient's capacity to carry out activities will improve  Outcome: Not Progressing     "

## 2023-03-04 NOTE — PROGRESS NOTES
Patient noted to have area of dermatitis in his groin region bilaterally. Upon examination noted to have area of redness around his groin bilaterally. Slightly warm to touch. No drainage noted.  He states that he has had this multiple times in the past and was told that this occurs often with humira.     At this time, will start diflucan.

## 2023-03-05 NOTE — CARE PLAN
The patient is Stable - Low risk of patient condition declining or worsening    Shift Goals  Clinical Goals: Pain control, IV ABX  Patient Goals: Comfort  Family Goals: JI    Progress made toward(s) clinical / shift goals:    Problem: Pain - Standard  Goal: Alleviation of pain or a reduction in pain to the patient’s comfort goal  Outcome: Progressing     Problem: Hemodynamics  Goal: Patient's hemodynamics, fluid balance and neurologic status will be stable or improve  Outcome: Progressing       Patient is not progressing towards the following goals:

## 2023-03-05 NOTE — PROGRESS NOTES
Infectious Disease Progress Note    Author: Romie Galdamez M.D. Date & Time of service: 3/5/2023  9:12 AM    Chief Complaint:  Follow-up for osteomyelitis    Interval History:  3/5 had another fever spike to 101.1 today, white count slightly up to 11.2, tolerating antimicrobial.  Fast asleep this morning    Labs Reviewed and Medications Reviewed.    Review of Systems:  Review of Systems   Unable to perform ROS: Other     Hemodynamics:  Temp (24hrs), Av.3 °C (99.1 °F), Min:36.6 °C (97.9 °F), Max:38.4 °C (101.1 °F)  Temperature: (!) 38.4 °C (101.1 °F)  Pulse  Av.4  Min: 59  Max: 102   Blood Pressure: 106/74       Physical Exam:  Physical Exam  Vitals and nursing note reviewed.   Constitutional:       General: He is not in acute distress.     Appearance: He is not ill-appearing.   HENT:      Nose: No congestion.   Eyes:      General: No scleral icterus.        Right eye: No discharge.         Left eye: No discharge.      Conjunctiva/sclera: Conjunctivae normal.   Cardiovascular:      Rate and Rhythm: Normal rate.      Heart sounds: No murmur heard.  Pulmonary:      Effort: Pulmonary effort is normal. No respiratory distress.      Breath sounds: No stridor.   Abdominal:      General: Abdomen is flat. There is no distension.      Palpations: There is no mass.      Comments: Left-sided tinea cruris   Musculoskeletal:         General: Swelling and tenderness present.      Comments:  Bilateral plantar chronic wounds, left foot swelling without significant erythema.  Left leg with area of cellulitis, dusky erythema, improved   Skin:     Findings: Erythema and rash present.      Comments: In addition to above, small papular erythematous lesions, patient states the eruptions appear quite frequently during RA flares   Neurological:      General: No focal deficit present.      Mental Status: He is alert and oriented to person, place, and time.   Psychiatric:         Mood and Affect: Mood normal.         Behavior:  Behavior normal.       Meds:    Current Facility-Administered Medications:     [DISCONTINUED] oxyCODONE immediate-release **OR** [DISCONTINUED] oxyCODONE immediate-release **OR** HYDROmorphone    ketoconazole    potassium chloride SA    fluconazole    calcium citrate    thiamine    zolpidem    nicotine    D5LR    ceFAZolin    morphine    morphine ER    omeprazole    senna-docusate **AND** polyethylene glycol/lytes **AND** magnesium hydroxide **AND** bisacodyl    LR    acetaminophen    Notify provider if pain remains uncontrolled **AND** Use the Numeric Rating Scale (NRS), Sy-Baker Faces (WBF), or FLACC on regular floors and Critical-Care Pain Observation Tool (CPOT) on ICUs/Trauma to assess pain **AND** Pulse Ox **AND** Pharmacy Consult Request **AND** If patient difficult to arouse and/or has respiratory depression (respiratory rate of 10 or less), stop any opiates that are currently infusing and call a Rapid Response.    Notify MD and PharmD if FSBG level is less than or equal to 70 mg/dL or patient is showing signs/symptoms of hypoglycemia (tachycardia, palpitations, diaphoresis, clammy, tremulousness, nausea, confused) **AND** Administer 20 grams of glucose (approximately 8 ounces of fruit juice) every 15 minutes PRN FSBS less than 70 mg/dL **AND** dextrose bolus    Labs:  Recent Labs     03/03/23 0307 03/04/23  0319 03/05/23  0013   WBC 9.9 10.7 11.2*   RBC 3.83* 4.12* 3.89*   HEMOGLOBIN 11.2* 11.8* 11.2*   HEMATOCRIT 33.2* 35.1* 33.4*   MCV 86.7 85.2 85.9   MCH 29.2 28.6 28.8   RDW 48.5 47.8 47.9   PLATELETCT 87* 85* 94*   MPV 10.9 10.7 10.2   NEUTSPOLYS 77.90* 53.40 76.70*   LYMPHOCYTES 14.30* 22.90 12.90*   MONOCYTES 4.60 15.30* 7.80   EOSINOPHILS 0.10 0.80 0.00   BASOPHILS 0.50 0.00 0.00   RBCMORPHOLO  --  Present Present     Recent Labs     03/03/23 0307 03/04/23  0319 03/05/23  0013   SODIUM 131* 136  136 135   POTASSIUM 3.3* 3.2*  3.2* 3.4*   CHLORIDE 99 102  102 101   CO2 23 23 23 25   GLUCOSE  96 86  86 90   BUN 7* 6*  6* 5*     Recent Labs     03/03/23  0307 03/04/23  0319 03/05/23  0013   ALBUMIN 1.8* 2.0* 1.9*   TBILIRUBIN 0.3 0.3 0.4   ALKPHOSPHAT 203* 192* 182*   TOTPROTEIN 5.3* 5.4* 5.1*   ALTSGPT 29 18 11   ASTSGOT 37 29 17   CREATININE 0.56 0.59  0.59 0.52       Imaging:  CT-EXTREMITY, LOWER WITH LEFT    Result Date: 2/28/2023 2/28/2023 4:10 PM HISTORY/REASON FOR EXAM:  below the knee down, concern for infectious process. TECHNIQUE/EXAM DESCRIPTION AND NUMBER OF VIEWS:  CT scan of the LEFT lower extremity with contrast, with reconstructions. Thin helical 3 mm sections were obtained from the distal femur through the proximal tibia/fibula. Sagittal and coronal multiplanar reconstructions were generated from the axial images. A total of 100 mL of Omnipaque 350 nonionic contrast was administered  IV without complication. Up to date radiation dose reduction adjustments have been utilized to meet ALARA standards for radiation dose reduction. COMPARISON: None. FINDINGS: There is normal bony mineralization of the left tibia and fibula. There is no evidence for acute or chronic osteomyelitis. Additionally there is evidence of no deep abscess formation in the left lower leg or foot.     1.  No evidence of acute osteomyelitis of the left lower extremity. 2.  No evidence of deep abscess formation in the subcutaneous tissues.    CT-HEAD W/O    Result Date: 2/28/2023 2/28/2023 12:13 PM HISTORY/REASON FOR EXAM:  Mental status change, unknown cause. TECHNIQUE/EXAM DESCRIPTION AND NUMBER OF VIEWS: CT of the head without contrast. The study was performed on a helical multidetector CT scanner. Contiguous axial sections were obtained from the skull base through the vertex. Up to date radiation dose reduction adjustments have been utilized to meet ALARA standards for radiation dose reduction. COMPARISON:  Head CT 6/28/2010 FINDINGS: The calvariae and skull base are unremarkable. There are no extraaxial fluid  collections. The ventricular system and basal cisterns are within normal limits. There are no areas of abnormal density in the brain substance. There are no hemorrhagic lesions.  There are no mass effects or shift of midline structures. The brainstem and posterior fossa structures are unremarkable. Paranasal sinuses in the field of view are unremarkable. Mastoids in the field of view are unremarkable.     Head CT without contrast within normal limits. No evidence of acute cerebral infarction, hemorrhage or mass lesion.     DX-CHEST-PORTABLE (1 VIEW)    Result Date: 2/28/2023 2/28/2023 11:11 AM HISTORY/REASON FOR EXAM:  possible sepsis.. TECHNIQUE/EXAM DESCRIPTION AND NUMBER OF VIEWS: Single portable view of the chest. COMPARISON: Chest x-ray 3/12/2021 FINDINGS: Heart size is within normal limits. No pulmonary infiltrates or consolidations are noted. No pleural abnormalities are noted.     No acute cardiac or pulmonary abnormalities are identified.    DX-FOOT-2- LEFT    Result Date: 2/28/2023 2/28/2023 1:44 PM HISTORY/REASON FOR EXAM:  Atraumatic Pain/Swelling/Deformity TECHNIQUE/EXAM DESCRIPTION AND NUMBER OF VIEWS: 2 views of the LEFT foot. COMPARISON:  MRI of the ankle 1/28/2023. Foot radiographs 1/21/2023 FINDINGS: Osteopenia is again present, decreasing sensitivity of the study for detection of acute displaced fracture. There is flat foot deformity as seen previously. No acute displaced fracture is noted. Mild solid periosteal reaction seen along the second third and fourth metatarsal shafts is new on the second and third. There is no subluxation. There is probable prominent plaque-like ulceration of the heel.     Severe osteopenia and flat foot deformity without evidence of acute displaced fracture No definite radiographic evidence of osteomyelitis.    DX-TIBIA AND FIBULA LEFT    Result Date: 2/28/2023 2/28/2023 2:25 PM HISTORY/REASON FOR EXAM:  Atraumatic Pain/Swelling/Deformity. TECHNIQUE/EXAM DESCRIPTION  AND NUMBER OF VIEWS:  2 views of the LEFT tibia and fibula. COMPARISON: MRI ankle 2023 FINDINGS: There is generalized osteopenia. No acute or aggressive osseous abnormality. No dislocation. There is diffuse soft tissue swelling of the leg, edema and/or cellulitis.     1.  Diffuse soft tissue swelling, edema and/or cellulitis. 2.  No acute osseous abnormality. 3.  Osteopenia.    US-EXTREMITY VENOUS LOWER UNILAT LEFT    Result Date: 2023   Vascular Laboratory  CONCLUSIONS  No deep venous thrombosis in the common femoral, profunda femoral, femoral,  popliteal, and posterior tibial veins.  The peroneal veins could not be adequately evaluated to exclude thrombosis.  LESLIE BADILLO  Exam Date:     2023 14:21  Room #:     Inpatient  Priority:     Stat  Ht (in):             Wt (lb):  Ordering Physician:        PUNEET KEARNEY  Referring Physician:       [id number] Peralta  Sonographer:               Delfina Gregory RVT  Study Type:                Complete Unilateral  Technical Quality:         Adequate  Age:    47    Gender:     M  MRN:    [id number]  :    1975      BSA:  Indications:     Localized swelling, mass and lump, left lower limb  CPT Codes:       48133  ICD Codes:       R22.42  History:         Swelling/edema and redness of the left calf with ulcer of                   left foot.  Limitations:  PROCEDURES:  Left lower extremity venous duplex imaging.  The following venous structures were evaluated: common femoral, deep  femoral, proximal great saphenous, femoral, popliteal, peroneal, and  posterior tibial veins.  Serial compression, color, and spectral Doppler flow evaluations were  performed.  FINDINGS:  Left lower extremity.  The posterior tibial veins are difficult to assess for compressibility, but  flow response to augmentation is demonstrated.  The peroneal veins are difficult to assess for compressibility and flow  response to augmentation.  All other veins demonstrate complete color filling  "and compressibility with  normal venous flow dynamics including spontaneous flow and respiratory  phasicity.  No deep venous thrombosis in the common femoral, profunda femoral, femoral,  popliteal, and posterior tibial veins.  Cannot rule out deep venous thrombosis in the peroneal veins.  Flow was evaluated in the contralateral common femoral vein and normal  venous flow dynamics including spontaneous flow and respiratory phasic  variation were demonstrated.    Nas Beasley  (Electronically Signed)  Final Date:      28 February 2023                   15:13      Micro:  Results       Procedure Component Value Units Date/Time    Urine Culture (New) [[id number]] Collected: 03/01/23 0556    Order Status: Completed Specimen: Urine Updated: 03/03/23 0622     Significant Indicator NEG     Source UR     Site -     Culture Result No growth at 48 hours.    Narrative:      Indication for culture:->Evaluation for sepsis without a  clear source of infection  Indication for culture:->Evaluation for sepsis without a    Blood Culture [[id number]]  (Abnormal) Collected: 02/28/23 1111    Order Status: Completed Specimen: Blood from Peripheral Updated: 03/02/23 1631     Significant Indicator POS     Source BLD     Site PERIPHERAL     Culture Result Growth detected by Bactec instrument. 02/28/2023  20:18      Beta Hemolytic Streptococcus group A  See previous culture for sensitivity report.      Narrative:      CALL  [facility]  [facility] tel. [phone],  CALLED  [facility] tel. [phone] 02/28/2023, 20:20, RB PERF. RESULTS CALLED TO:  RN [id number]  Per Hospital Policy: Only change Specimen Src: to \"Line\" if  specified by physician order.  Left Hand    Blood Culture [[id number]]  (Abnormal)  (Susceptibility) Collected: 02/28/23 1110    Order Status: Completed Specimen: Blood from Peripheral Updated: 03/02/23 1631     Significant Indicator POS     Source BLD     Site PERIPHERAL     Culture Result Growth detected by Bactec instrument. 02/28/2023  19:24     " " Beta Hemolytic Streptococcus group A  This isolate does NOT demonstrate inducible Clindamycin  resistance in vitro.      Narrative:      CALL  [facility] tel. [phone],  CALLED  [facility] tel. [phone] 03/01/2023, 10:33, RB PERF. RESULTS CALLED  TO:Nani Goldsmith RN [GRPA Strep]  Per Hospital Policy: Only change Specimen Src: to \"Line\" if  specified by physician order.  Right AC    Susceptibility       Beta hemolytic streptococcus group a (1)       Antibiotic Interpretation Microscan   Method Status    Cefotaxime Sensitive 0.064 mcg/mL E-TEST Final    Penicillin Sensitive 0.047 mcg/mL E-TEST Final    Clindamycin Sensitive - mcg/mL E-TEST Final                       BLOOD CULTURE [[id number]]     Order Status: Canceled Specimen: Blood from Peripheral     BLOOD CULTURE [[id number]]     Order Status: Canceled Specimen: Blood from Peripheral     E-Test [[id number]] Collected: 02/28/23 1110    Order Status: Completed Specimen: Other Updated: 03/01/23 1033     ETEST Sensitivity INTERIM    Narrative:      [facility] tel. [phone] 03/01/2023, 10:33, RB PERF. RESULTS CALLED TO:Nani Goldsmith RN [GRPA Strep]  Per Hospital Policy: Only change Specimen Src: to \"Line\" if  specified by physician order.    Blood Culture [[id number]] Collected: 02/28/23 2146    Order Status: Completed Specimen: Blood from Peripheral Updated: 03/01/23 0851     Significant Indicator NEG     Source BLD     Site PERIPHERAL     Culture Result No Growth  Note: Blood cultures are incubated for 5 days and  are monitored continuously.Positive blood cultures  are called to the RN and reported as soon as  they are identified.      Narrative:      From different peripheral sites, if not done within the last  24 hours (Per Hospital Policy: Only change specimen source to  \"Line\" if specified by physician order)  Right Forearm/Arm    Blood Culture [[id number]] Collected: 02/28/23 2011    Order Status: Completed Specimen: Blood from Peripheral Updated: 03/01/23 0851 " "    Significant Indicator NEG     Source BLD     Site PERIPHERAL     Culture Result No Growth  Note: Blood cultures are incubated for 5 days and  are monitored continuously.Positive blood cultures  are called to the RN and reported as soon as  they are identified.      Narrative:      From different peripheral sites, if not done within the last  24 hours (Per Hospital Policy: Only change specimen source to  \"Line\" if specified by physician order)  Left Forearm/Arm    Urinalysis [[id number]]  (Abnormal) Collected: 03/01/23 0556    Order Status: Completed Specimen: Urine Updated: 03/01/23 0640     Color Yellow     Character Clear     Specific Gravity 1.013     Ph 6.0     Glucose Negative mg/dL      Ketones Negative mg/dL      Protein Negative mg/dL      Bilirubin Negative     Urobilinogen, Urine 1.0     Nitrite Negative     Leukocyte Esterase Negative     Occult Blood Small     Micro Urine Req Microscopic    Narrative:      Indication for culture:->Evaluation for sepsis without a  clear source of infection    MRSA By PCR (Amp) [[id number]]     Order Status: Canceled Specimen: Respirate from Nares     CoV-2, FLU A/B, and RSV by PCR (2-4 Hours [email or web address]) : Collect NP swab in VTM [[id number]] Collected: 02/28/23 1416    Order Status: Completed Specimen: Respirate Updated: 02/28/23 1602     Influenza virus A RNA Negative     Influenza virus B, PCR Negative     RSV, PCR Negative     SARS-CoV-2 by PCR NotDetected     Comment: [facility] providers: PLEASE REFER TO DE-ESCALATION AND RETESTING PROTOCOL  on [email or web address]    **The [employer] GeneXpert Xpress SARS-CoV-2 RT-PCR Test has been made  available for use under the Emergency Use Authorization (EUA) only.          SARS-CoV-2 Source NP Swab            Assessment:  Richard Hubbard II is a 47 y.o. male patient with rheumatoid arthritis with rheumatoid nodules on Humira, history of planovalgus deformities and chronic bilateral plantar foot ulcers since early 2022.  Patient was " being evaluated at the [facility], plan was for outpatient MRI but this was delayed, eventually obtained on 1/28/2023, found to have marrow edema involving the navicular,  cuboid, first through third cuneiforms, 1st-3rd proximal metatarsals, concerning for osteomyelitis.  He was advised reconstructive surgeries but this has been delayed due to social issues.  About a week ago, patient began to experience worsening redness and swelling of the left lower extremity.  He presented to the ER.  Tmax is 101, no leukocytosis.  Blood cultures x2 from 2/28 positive for group A Strep.  Blood cultures from later that same day negative thus far.  CT scan with no obvious abscess. Orthopedics outpatient, no plans for surgery while inpatient.     Pertinent Diagnoses:  Group A Strep bacteremia  Left leg cellulitis, improved  Left foot chronic osteomyelitis  Left-sided tinea cruris  Rheumatoid arthritis  Chronic foot deformities, chronic bilateral foot ulcers  Immunosuppressed status    Plan:  -Continue IV Ancef 2 g every 8 hours  -Follow-up with blood cultures x2 from 2/28, no growth till date  -Fever spikes continue, concerning for underlying abscess that may not have been picked up on CT scan.  Recommend repeat MRI scan with contrast of the left lower extremity  -Agree with topical and systemic antifungals for the tinea cruris     Disposition: Anticipate no ID specific disposition needs  Need for PICC line: No     Plan was discussed with the primary team, Dr. Dias     ID clinic follow-up in 2 weeks.  Okay to schedule with JEFFY Tyler    Discussed with internal medicine.

## 2023-03-05 NOTE — PROGRESS NOTES
Hospital Medicine Daily Progress Note    Date of Service  3/5/2023    Chief Complaint  Richard Hubbard II is a 47 y.o. male admitted 2/28/2023 with cellulitis and sepsis    Hospital Course  Richard Hubbard II is a 47 y.o. male who presented 2/28/2023 with history of rheumatoid arthritis on Humira with history of planovalgus deformities and foot ulcers with recent MRI concerning for osteomyelitis of the left navicular bone presented to the emergency department for evaluation of confusion and fever of 103 associated with hypoglycemia and worsening redness of the lower extremities more so on the left lower extremity.  He has a history of chronic pain syndrome with narcotic dependence.  The patient is not diabetic and has not had any prior history of hypoglycemia.  According to the wife he has been in the past on prednisone for rheumatoid arthritis flares but has not taken any over the past month.  No headache no loss of consciousness.  Patient was noted to be hypotensive and hypoglycemic received dextrose infusion antibiotics and fluids resuscitation and admitted to IMCU.  Blood cultures grew group A strep    ID recommendations  If cellulitis improvement continues, anticipate completion of sepsis treatment with p.o. linezolid 600 mg twice daily through 3/9/2023, followed by switch to p.o. amoxicillin 1 g twice daily for suppression in order to prevent recurrent sepsis while awaiting surgical intervention of the foot.      Interval Problem Update    Tmax 101.1  's   Improved p.o. intake I have ordered supplements  Started on Diflucan and clotrimazole for perineal rash  Discussed with Dr. Galdamez  he recommends transitioning to p.o. linezolid on discharge through 3/9/2023 followed by p.o. amoxicillin 1 g twice daily for 3 to 4 weeks and outpatient follow-up with ID and orthopedics    3/5/2023:  Appreciate infectious disease recommendations patient to undergo MRI today of left lower extremity from  knee joint through foot.  Concern based on repeat febrile episodes merit represent occult abscess.  Otherwise continue present antimicrobial treatment.  Duration antimicrobials appreciated as indicated above.  No acute events overnight continues to have febrile episodes.      I have discussed this patient's plan of care and discharge plan at IDT rounds today with Case Management, Nursing, Nursing leadership, and other members of the IDT team.    Consultants/Specialty  critical care    Code Status  Full Code    Disposition  Patient is not medically cleared for discharge.   Anticipate discharge to to home with close outpatient follow-up.  I have placed the appropriate orders for post-discharge needs.    Review of Systems  Review of Systems   Constitutional:  Positive for malaise/fatigue. Negative for fever.   Respiratory:  Negative for shortness of breath.    Cardiovascular:  Negative for chest pain.   Musculoskeletal:  Positive for joint pain.   Skin:  Positive for rash.   All other systems reviewed and are negative.     Physical Exam  Temp:  [36.6 °C (97.9 °F)-38.4 °C (101.1 °F)] 38.4 °C (101.1 °F)  Pulse:  [81-94] 88  Resp:  [16-22] 18  BP: (102-118)/(69-82) 106/74  SpO2:  [91 %-95 %] 92 %    Physical Exam  Vitals and nursing note reviewed.   Constitutional:       Appearance: He is well-developed. He is not diaphoretic.   HENT:      Head: Normocephalic and atraumatic.      Mouth/Throat:      Pharynx: No oropharyngeal exudate.   Eyes:      General: No scleral icterus.        Right eye: No discharge.         Left eye: No discharge.      Pupils: Pupils are equal, round, and reactive to light.   Neck:      Vascular: No JVD.      Trachea: No tracheal deviation.   Cardiovascular:      Rate and Rhythm: Normal rate and regular rhythm.      Heart sounds: No murmur heard.    No friction rub. No gallop.   Pulmonary:      Effort: Pulmonary effort is normal. No respiratory distress.      Breath sounds: Normal breath sounds. No  stridor. No wheezing.   Chest:      Chest wall: No tenderness.   Abdominal:      General: There is no distension.      Palpations: Abdomen is soft.      Tenderness: There is no abdominal tenderness. There is no rebound.      Hernia: A hernia (Soft and reducible) is present.   Musculoskeletal:         General: Swelling and tenderness present.      Cervical back: Neck supple.   Skin:     General: Skin is warm.      Findings: Erythema present.      Nails: There is no clubbing.      Comments: Left scrotal perineal rash    Left lower extremity erythema improved   Neurological:      Mental Status: He is alert and oriented to person, place, and time.      Cranial Nerves: No cranial nerve deficit.      Motor: No abnormal muscle tone.   Psychiatric:         Behavior: Behavior normal.       Fluids    Intake/Output Summary (Last 24 hours) at 3/5/2023 1501  Last data filed at 3/5/2023 0805  Gross per 24 hour   Intake --   Output 4725 ml   Net -4725 ml         Laboratory  Recent Labs     03/03/23 0307 03/04/23 0319 03/05/23  0013   WBC 9.9 10.7 11.2*   RBC 3.83* 4.12* 3.89*   HEMOGLOBIN 11.2* 11.8* 11.2*   HEMATOCRIT 33.2* 35.1* 33.4*   MCV 86.7 85.2 85.9   MCH 29.2 28.6 28.8   MCHC 33.7 33.6* 33.5*   RDW 48.5 47.8 47.9   PLATELETCT 87* 85* 94*   MPV 10.9 10.7 10.2       Recent Labs     03/03/23 0307 03/04/23 0319 03/05/23  0013   SODIUM 131* 136  136 135   POTASSIUM 3.3* 3.2*  3.2* 3.4*   CHLORIDE 99 102  102 101   CO2 23 23 23 25   GLUCOSE 96 86  86 90   BUN 7* 6*  6* 5*   CREATININE 0.56 0.59  0.59 0.52   CALCIUM 7.1* 6.8*  6.8* 7.0*                       Imaging  CT-EXTREMITY, LOWER WITH LEFT   Final Result      1.  No evidence of acute osteomyelitis of the left lower extremity.      2.  No evidence of deep abscess formation in the subcutaneous tissues.      US-EXTREMITY VENOUS LOWER UNILAT LEFT   Final Result      DX-TIBIA AND FIBULA LEFT   Final Result      1.  Diffuse soft tissue swelling, edema and/or  cellulitis.   2.  No acute osseous abnormality.   3.  Osteopenia.      DX-FOOT-2- LEFT   Final Result      Severe osteopenia and flat foot deformity without evidence of acute displaced fracture      No definite radiographic evidence of osteomyelitis.      CT-HEAD W/O   Final Result      Head CT without contrast within normal limits. No evidence of acute cerebral infarction, hemorrhage or mass lesion.         DX-CHEST-PORTABLE (1 VIEW)   Final Result      No acute cardiac or pulmonary abnormalities are identified.      [id number]-WITH & W/O LEFT    (Results Pending)   MR-FOOT-WITH & W/O LEFT    (Results Pending)          Assessment/Plan  * Sepsis (HCC)- (present on admission)  Assessment & Plan  Sepsis secondary to group A strep cellulitis with bacteremia    Continue cefazolin cefazolin  CT negative for abscess  Duplex negative for DVT x-rays negative for acute bony pathology  Patient evaluated by orthopedics with nonsurgical management recommended and outpatient follow-up with Dr. Pryor  Continue wound care  Evaluated by ID              Yeast dermatitis  Assessment & Plan  Started on Diflucan and topical clotrimazole  Skin care    Electrolyte abnormality  Assessment & Plan  Hypokalemia  Hypomagnesemia  Hypophosphatemia  Hypocalcemia    Likely component of refeeding syndrome patient started on thiamine supplements    Replete with K-Phos and IV magnesium sulfate   Start calcium citrate supplements   Monitor electrolytes    Thrombocytopenia (HCC)  Assessment & Plan  Platelet counts 85K likely reactive secondary to sepsis  Monitor CBC        Hypoglycemia  Assessment & Plan  Likely secondary to poor intake  Hypoglycemia protocol  Continue D5 LR infusion overnight and if p.o. intake remains stable will DC in a.m.  Monitor CBGs    Ulcer of both feet (HCC)- (present on admission)  Assessment & Plan  Wound care  CT negative for abscess  MRI concerning for osteomyelitis patient evaluated by LPS continue wound  care  Continue Ancef  Discussed with ID Dr. Galdamez recommends transitioning to p.o. Zyvox on discharge through 3/9/2023 followed by p.o. amoxicillin 1 g twice daily for 3 to 4 weeks and outpatient follow-up with ID and orthopedics    Chronic use of opiate drug for therapeutic purpose- (present on admission)  Assessment & Plan  With narcotic dependence    Continue home dose of morphine sulfate    History of immunosuppressive therapy- (present on admission)  Assessment & Plan  On Humira and intermittent steroids    Rheumatoid arthritis (HCC)- (present on admission)  Assessment & Plan  Has been maintained on Humira and intermittently on prednisone although no recent steroid use  Continue to hold Humira  Cortisol level 45    tapered off hydrocortisone    History of bleeding ulcers  Assessment & Plan  Continue prilosec  Patient declines pharmacologic DVT prophylaxis discussed RBA         VTE prophylaxis: SCDs/TEDs    I have performed a physical exam and reviewed and updated ROS and Plan today (3/5/2023). In review of yesterday's note (3/4/2023), there are no changes except as documented above.

## 2023-03-06 PROBLEM — M65.9: Status: ACTIVE | Noted: 2023-01-01

## 2023-03-06 NOTE — CARE PLAN
The patient is Stable - Low risk of patient condition declining or worsening    Shift Goals  Clinical Goals: Pain control and IV ABX  Patient Goals: Rest and Comfort  Family Goals: JI    Progress made toward(s) clinical / shift goals:    Problem: Pain - Standard  Goal: Alleviation of pain or a reduction in pain to the patient’s comfort goal  Outcome: Progressing     Problem: Physical Regulation  Goal: Diagnostic test results will improve  Outcome: Progressing  Goal: Signs and symptoms of infection will decrease  Outcome: Progressing       Patient is not progressing towards the following goals:

## 2023-03-06 NOTE — PROGRESS NOTES
Called regarding new mri findings  New evidence of fluid/edema near PTT  Intermittent fevers, mild elevation in WBC  Not currently septic  Please make NPO at mn for possible surgical intervention tomorrow  Will require BKA at some point, does not appear urgent however      River Colindres MD  Orthopedic Trauma Surgery

## 2023-03-06 NOTE — DISCHARGE PLANNING
Pt lives at home with wife and two kids. Pt was given choice between SNF, home with HH, and rehab- he opted for rehab. Pt states he was using a walker prior to admittance at [facility]. LSW confirmed demographics on facesheet.     Care Transition Team Assessment    Information Source  Orientation Level: Oriented X4  Information Given By: Patient  Who is responsible for making decisions for patient? : Patient    Readmission Evaluation  Is this a readmission?: No    Elopement Risk  Legal Hold: No  Ambulatory or Self Mobile in Wheelchair: No-Not an Elopement Risk  Elopement Risk: Not at Risk for Elopement    Interdisciplinary Discharge Planning  Lives with - Patient's Self Care Capacity: Spouse, Child Less than 18 Years of Age  Support Systems: (P) [employer], Family Member(s), Spouse / Significant Other  Housing / Facility: 1 Story House ([address])  Mobility Issues: Yes  Prior Services: Home-Independent  Patient Prefers to be Discharged to:: (P) Home  Durable Medical Equipment: (P) Walker    Discharge Preparedness  What is your plan after discharge?: Other (comment) (Acute Rehab)  What are your discharge supports?: Spouse  Prior Functional Level: Uses Walker, Independent with Activities of Daily Living  Difficulity with ADLs: Walking  Difficulity with IADLs: None    Functional Assesment  Prior Functional Level: Uses Walker, Independent with Activities of Daily Living    Finances  Financial Barriers to Discharge: No  Prescription Coverage: Yes    Vision / Hearing Impairment  Vision Impairment : Yes  Right Eye Vision: Impaired  Left Eye Vision: Impaired  Hearing Impairment : No         Advance Directive  Advance Directive?: None    Domestic Abuse  Have you ever been the victim of abuse or violence?: No  Physical Abuse or Sexual Abuse: No  Verbal Abuse or Emotional Abuse: No  Possible Abuse/Neglect Reported to:: Not Applicable    Psychological Assessment  History of Substance Abuse: None  History of  Psychiatric Problems: No    Discharge Risks or Barriers  Discharge risks or barriers?: No    Anticipated Discharge Information  Discharge Disposition: Disch to IP rehab facility or distinct part unit (62)

## 2023-03-06 NOTE — CARE PLAN
Problem: Pain - Standard  Goal: Alleviation of pain or a reduction in pain to the patient’s comfort goal  Outcome: Progressing     Problem: Hemodynamics  Goal: Patient's hemodynamics, fluid balance and neurologic status will be stable or improve  Outcome: Progressing     Problem: Fluid Volume  Goal: Fluid volume balance will be maintained  Outcome: Progressing     Problem: Urinary - Renal Perfusion  Goal: Ability to achieve and maintain adequate renal perfusion and functioning will improve  Outcome: Progressing   The patient is Watcher - Medium risk of patient condition declining or worsening    Shift Goals  Clinical Goals: Pain control and IV ABX  Patient Goals: Rest and Comfort  Family Goals: JI

## 2023-03-06 NOTE — DISCHARGE PLANNING
Received Choice form at 0958  Agency/Facility Name: [facility]  Referral sent per Choice form @ [phone]

## 2023-03-06 NOTE — PROGRESS NOTES
Infectious Disease Progress Note    Author: Romie Galdamez M.D. Date & Time of service: 3/6/2023  11:58 AM    Chief Complaint:  Follow-up for osteomyelitis    Interval History:  3/5 had another fever spike to 101.1 today, white count slightly up to 11.2, tolerating antimicrobial.  Fast asleep this morning  3/6 Tmax 99.1, no CBC this morning, Patient notes feeling much worse overall, persistent pain and tenderness over posterior distal leg.  MRI findings as below    Labs Reviewed and Medications Reviewed.    Review of Systems:  Review of Systems   Constitutional:  Negative for chills and fever.   Gastrointestinal:  Negative for abdominal pain, diarrhea, nausea and vomiting.   Musculoskeletal:  Positive for myalgias.   Skin:  Negative for itching and rash.   All other systems reviewed and are negative.    Hemodynamics:  Temp (24hrs), Av.2 °C (98.9 °F), Min:36.7 °C (98 °F), Max:37.4 °C (99.3 °F)  Temperature: 36.7 °C (98 °F)  Pulse  Av.5  Min: 59  Max: 102   Blood Pressure: 106/72       Physical Exam:  Physical Exam  Vitals and nursing note reviewed.   Constitutional:       General: He is not in acute distress.     Appearance: He is not ill-appearing.   HENT:      Nose: No congestion.   Eyes:      General: No scleral icterus.        Right eye: No discharge.         Left eye: No discharge.      Conjunctiva/sclera: Conjunctivae normal.   Cardiovascular:      Rate and Rhythm: Normal rate.      Heart sounds: No murmur heard.  Pulmonary:      Effort: Pulmonary effort is normal. No respiratory distress.      Breath sounds: No stridor.   Abdominal:      General: Abdomen is flat. There is no distension.      Palpations: There is no mass.      Comments: Left-sided tinea cruris   Musculoskeletal:         General: Swelling and tenderness present.      Comments: Bilateral plantar chronic wounds, left foot swelling without significant erythema.    Left leg anterior cellulitis has significantly improved with wrinkling of  skin with the exception of a persistently tender, extremely hard area posterior distal leg, significant pain with dorsiflexion of the foot    Skin:     Findings: Erythema and rash present.      Comments: In addition to above, small papular erythematous lesions, patient states the eruptions appear quite frequently during RA flares   Neurological:      General: No focal deficit present.      Mental Status: He is alert and oriented to person, place, and time.   Psychiatric:         Mood and Affect: Mood normal.         Behavior: Behavior normal.       Meds:    Current Facility-Administered Medications:     [DISCONTINUED] oxyCODONE immediate-release **OR** [DISCONTINUED] oxyCODONE immediate-release **OR** HYDROmorphone    ketoconazole    morphine    calcium citrate    thiamine    zolpidem    nicotine    D5LR    ceFAZolin    morphine ER    omeprazole    senna-docusate **AND** polyethylene glycol/lytes **AND** magnesium hydroxide **AND** bisacodyl    LR    acetaminophen    Notify provider if pain remains uncontrolled **AND** Use the Numeric Rating Scale (NRS), Sy-Baker Faces (WBF), or FLACC on regular floors and Critical-Care Pain Observation Tool (CPOT) on ICUs/Trauma to assess pain **AND** Pulse Ox **AND** Pharmacy Consult Request **AND** If patient difficult to arouse and/or has respiratory depression (respiratory rate of 10 or less), stop any opiates that are currently infusing and call a Rapid Response.    Notify MD and PharmD if FSBG level is less than or equal to 70 mg/dL or patient is showing signs/symptoms of hypoglycemia (tachycardia, palpitations, diaphoresis, clammy, tremulousness, nausea, confused) **AND** Administer 20 grams of glucose (approximately 8 ounces of fruit juice) every 15 minutes PRN FSBS less than 70 mg/dL **AND** dextrose bolus    Labs:  Recent Labs     03/04/23  0319 03/05/23  0013   WBC 10.7 11.2*   RBC 4.12* 3.89*   HEMOGLOBIN 11.8* 11.2*   HEMATOCRIT 35.1* 33.4*   MCV 85.2 85.9   MCH 28.6  28.8   RDW 47.8 47.9   PLATELETCT 85* 94*   MPV 10.7 10.2   NEUTSPOLYS 53.40 76.70*   LYMPHOCYTES 22.90 12.90*   MONOCYTES 15.30* 7.80   EOSINOPHILS 0.80 0.00   BASOPHILS 0.00 0.00   RBCMORPHOLO Present Present       Recent Labs     03/04/23 0319 03/05/23  0013   SODIUM 136  136 135   POTASSIUM 3.2*  3.2* 3.4*   CHLORIDE 102  102 101   CO2 23  23 25   GLUCOSE 86  86 90   BUN 6*  6* 5*       Recent Labs     03/04/23 0319 03/05/23  0013   ALBUMIN 2.0* 1.9*   TBILIRUBIN 0.3 0.4   ALKPHOSPHAT 192* 182*   TOTPROTEIN 5.4* 5.1*   ALTSGPT 18 11   ASTSGOT 29 17   CREATININE 0.59  0.59 0.52         Imaging:  CT-EXTREMITY, LOWER WITH LEFT    Result Date: 2/28/2023 2/28/2023 4:10 PM HISTORY/REASON FOR EXAM:  below the knee down, concern for infectious process. TECHNIQUE/EXAM DESCRIPTION AND NUMBER OF VIEWS:  CT scan of the LEFT lower extremity with contrast, with reconstructions. Thin helical 3 mm sections were obtained from the distal femur through the proximal tibia/fibula. Sagittal and coronal multiplanar reconstructions were generated from the axial images. A total of 100 mL of Omnipaque 350 nonionic contrast was administered  IV without complication. Up to date radiation dose reduction adjustments have been utilized to meet ALARA standards for radiation dose reduction. COMPARISON: None. FINDINGS: There is normal bony mineralization of the left tibia and fibula. There is no evidence for acute or chronic osteomyelitis. Additionally there is evidence of no deep abscess formation in the left lower leg or foot.     1.  No evidence of acute osteomyelitis of the left lower extremity. 2.  No evidence of deep abscess formation in the subcutaneous tissues.    CT-HEAD W/O    Result Date: 2/28/2023 2/28/2023 12:13 PM HISTORY/REASON FOR EXAM:  Mental status change, unknown cause. TECHNIQUE/EXAM DESCRIPTION AND NUMBER OF VIEWS: CT of the head without contrast. The study was performed on a helical multidetector CT scanner.  Contiguous axial sections were obtained from the skull base through the vertex. Up to date radiation dose reduction adjustments have been utilized to meet ALARA standards for radiation dose reduction. COMPARISON:  Head CT 6/28/2010 FINDINGS: The calvariae and skull base are unremarkable. There are no extraaxial fluid collections. The ventricular system and basal cisterns are within normal limits. There are no areas of abnormal density in the brain substance. There are no hemorrhagic lesions.  There are no mass effects or shift of midline structures. The brainstem and posterior fossa structures are unremarkable. Paranasal sinuses in the field of view are unremarkable. Mastoids in the field of view are unremarkable.     Head CT without contrast within normal limits. No evidence of acute cerebral infarction, hemorrhage or mass lesion.     DX-CHEST-PORTABLE (1 VIEW)    Result Date: 2/28/2023 2/28/2023 11:11 AM HISTORY/REASON FOR EXAM:  possible sepsis.. TECHNIQUE/EXAM DESCRIPTION AND NUMBER OF VIEWS: Single portable view of the chest. COMPARISON: Chest x-ray 3/12/2021 FINDINGS: Heart size is within normal limits. No pulmonary infiltrates or consolidations are noted. No pleural abnormalities are noted.     No acute cardiac or pulmonary abnormalities are identified.    DX-FOOT-2- LEFT    Result Date: 2/28/2023 2/28/2023 1:44 PM HISTORY/REASON FOR EXAM:  Atraumatic Pain/Swelling/Deformity TECHNIQUE/EXAM DESCRIPTION AND NUMBER OF VIEWS: 2 views of the LEFT foot. COMPARISON:  MRI of the ankle 1/28/2023. Foot radiographs 1/21/2023 FINDINGS: Osteopenia is again present, decreasing sensitivity of the study for detection of acute displaced fracture. There is flat foot deformity as seen previously. No acute displaced fracture is noted. Mild solid periosteal reaction seen along the second third and fourth metatarsal shafts is new on the second and third. There is no subluxation. There is probable prominent plaque-like ulceration  of the heel.     Severe osteopenia and flat foot deformity without evidence of acute displaced fracture No definite radiographic evidence of osteomyelitis.    DX-TIBIA AND FIBULA LEFT    Result Date: 2023 2:25 PM HISTORY/REASON FOR EXAM:  Atraumatic Pain/Swelling/Deformity. TECHNIQUE/EXAM DESCRIPTION AND NUMBER OF VIEWS:  2 views of the LEFT tibia and fibula. COMPARISON: MRI ankle 2023 FINDINGS: There is generalized osteopenia. No acute or aggressive osseous abnormality. No dislocation. There is diffuse soft tissue swelling of the leg, edema and/or cellulitis.     1.  Diffuse soft tissue swelling, edema and/or cellulitis. 2.  No acute osseous abnormality. 3.  Osteopenia.    US-EXTREMITY VENOUS LOWER UNILAT LEFT    Result Date: 2023   Vascular Laboratory  CONCLUSIONS  No deep venous thrombosis in the common femoral, profunda femoral, femoral,  popliteal, and posterior tibial veins.  The peroneal veins could not be adequately evaluated to exclude thrombosis.  LESLIE BADILLO  Exam Date:     2023 14:21  Room #:     Inpatient  Priority:     Stat  Ht (in):             Wt (lb):  Ordering Physician:        PUNEET KEARNEY  Referring Physician:       [id number] Peralta  Sonographer:               Delfina Gregory RVBANDAR  Study Type:                Complete Unilateral  Technical Quality:         Adequate  Age:    47    Gender:     M  MRN:    [id number]  :    1975      BSA:  Indications:     Localized swelling, mass and lump, left lower limb  CPT Codes:       21524  ICD Codes:       R22.42  History:         Swelling/edema and redness of the left calf with ulcer of                   left foot.  Limitations:  PROCEDURES:  Left lower extremity venous duplex imaging.  The following venous structures were evaluated: common femoral, deep  femoral, proximal great saphenous, femoral, popliteal, peroneal, and  posterior tibial veins.  Serial compression, color, and spectral Doppler flow evaluations were   "performed.  FINDINGS:  Left lower extremity.  The posterior tibial veins are difficult to assess for compressibility, but  flow response to augmentation is demonstrated.  The peroneal veins are difficult to assess for compressibility and flow  response to augmentation.  All other veins demonstrate complete color filling and compressibility with  normal venous flow dynamics including spontaneous flow and respiratory  phasicity.  No deep venous thrombosis in the common femoral, profunda femoral, femoral,  popliteal, and posterior tibial veins.  Cannot rule out deep venous thrombosis in the peroneal veins.  Flow was evaluated in the contralateral common femoral vein and normal  venous flow dynamics including spontaneous flow and respiratory phasic  variation were demonstrated.    Nas Beasley  (Electronically Signed)  Final Date:      28 February 2023                   15:13      Micro:  Results       Procedure Component Value Units Date/Time    Blood Culture [[id number]] Collected: 02/28/23 2011    Order Status: Completed Specimen: Blood from Peripheral Updated: 03/05/23 2300     Significant Indicator NEG     Source BLD     Site PERIPHERAL     Culture Result No growth after 5 days of incubation.    Narrative:      From different peripheral sites, if not done within the last  24 hours (Per Hospital Policy: Only change specimen source to  \"Line\" if specified by physician order)  Left Forearm/Arm    Blood Culture [[id number]] Collected: 02/28/23 2146    Order Status: Completed Specimen: Blood from Peripheral Updated: 03/05/23 2300     Significant Indicator NEG     Source BLD     Site PERIPHERAL     Culture Result No growth after 5 days of incubation.    Narrative:      From different peripheral sites, if not done within the last  24 hours (Per Hospital Policy: Only change specimen source to  \"Line\" if specified by physician order)  Right Forearm/Arm    Urine Culture (New) [[id number]] Collected: 03/01/23 0556    Order " "Status: Completed Specimen: Urine Updated: 03/03/23 0622     Significant Indicator NEG     Source UR     Site -     Culture Result No growth at 48 hours.    Narrative:      Indication for culture:->Evaluation for sepsis without a  clear source of infection  Indication for culture:->Evaluation for sepsis without a    Blood Culture [[id number]]  (Abnormal) Collected: 02/28/23 1111    Order Status: Completed Specimen: Blood from Peripheral Updated: 03/02/23 1631     Significant Indicator POS     Source BLD     Site PERIPHERAL     Culture Result Growth detected by Bactec instrument. 02/28/2023  20:18      Beta Hemolytic Streptococcus group A  See previous culture for sensitivity report.      Narrative:      CALL  Swann  [facility] [phone],  [facility]ED  [facility] [phone] 02/28/2023, 20:20, RB PERF. RESULTS CALLED TO:  RN [id number]  Per Hospital Policy: Only change Specimen Src: to \"Line\" if  specified by physician order.  Left Hand    Blood Culture [[id number]]  (Abnormal)  (Susceptibility) Collected: 02/28/23 1110    Order Status: Completed Specimen: Blood from Peripheral Updated: 03/02/23 1631     Significant Indicator POS     Source BLD     Site PERIPHERAL     Culture Result Growth detected by Bactec instrument. 02/28/2023  19:24      Beta Hemolytic Streptococcus group A  This isolate does NOT demonstrate inducible Clindamycin  resistance in vitro.      Narrative:      CALL  Swann  [facility] [phone],  [facility]  [facility] [phone] 03/01/2023, 10:33, RB PERF. RESULTS CALLED  TO:Nani Goldsmith RN [GRPA Strep]  Per Hospital Policy: Only change Specimen Src: to \"Line\" if  specified by physician order.  Right AC    Susceptibility       Beta hemolytic streptococcus group a (1)       Antibiotic Interpretation Microscan   Method Status    Cefotaxime Sensitive 0.064 mcg/mL E-TEST Final    Penicillin Sensitive 0.047 mcg/mL E-TEST Final    Clindamycin Sensitive - mcg/mL E-TEST Final                       BLOOD CULTURE " "[[id number]]     Order Status: Canceled Specimen: Blood from Peripheral     BLOOD CULTURE [[id number]]     Order Status: Canceled Specimen: Blood from Peripheral     E-Test [[id number]] Collected: 02/28/23 1110    Order Status: Completed Specimen: Other Updated: 03/01/23 1033     ETEST Sensitivity INTERIM    Narrative:      [facility] tel. [phone] 03/01/2023, 10:33, RB PERF. RESULTS CALLED TO:Nani Goldsmith RN [GRPA Strep]  Per Hospital Policy: Only change Specimen Src: to \"Line\" if  specified by physician order.    Urinalysis [[id number]]  (Abnormal) Collected: 03/01/23 0556    Order Status: Completed Specimen: Urine Updated: 03/01/23 0640     Color Yellow     Character Clear     Specific Gravity 1.013     Ph 6.0     Glucose Negative mg/dL      Ketones Negative mg/dL      Protein Negative mg/dL      Bilirubin Negative     Urobilinogen, Urine 1.0     Nitrite Negative     Leukocyte Esterase Negative     Occult Blood Small     Micro Urine Req Microscopic    Narrative:      Indication for culture:->Evaluation for sepsis without a  clear source of infection    MRSA By PCR (Amp) [[id number]]     Order Status: Canceled Specimen: Respirate from Nares     CoV-2, FLU A/B, and RSV by PCR (2-4 Hours CEPMetrolightID) : Collect NP swab in VTM [[id number]] Collected: 02/28/23 1416    Order Status: Completed Specimen: Respirate Updated: 02/28/23 1602     Influenza virus A RNA Negative     Influenza virus B, PCR Negative     RSV, PCR Negative     SARS-CoV-2 by PCR NotDetected     Comment: [facility] providers: PLEASE REFER TO DE-ESCALATION AND RETESTING PROTOCOL  on [email or web address]    **The [employer] GeneXpert Xpress SARS-CoV-2 RT-PCR Test has been made  available for use under the Emergency Use Authorization (EUA) only.          SARS-CoV-2 Source NP Swab            Assessment:  Richard Hubbard II is a 47 y.o. male patient with rheumatoid arthritis with rheumatoid nodules on Humira, history of planovalgus deformities and chronic bilateral " plantar foot ulcers since early 2022.  Patient was being evaluated at the [facility], plan was for outpatient MRI but this was delayed, eventually obtained on 1/28/2023, found to have marrow edema involving the navicular,  cuboid, first through third cuneiforms, 1st-3rd proximal metatarsals, concerning for osteomyelitis.  He was advised reconstructive surgeries but this has been delayed due to social issues.  About a week ago, patient began to experience worsening redness and swelling of the left lower extremity.  He presented to the ER.  Tmax is 101, no leukocytosis.  Blood cultures x2 from 2/28 positive for group A Strep.  Blood cultures from later that same day negative thus far.  CT scan with no obvious abscess. Orthopedics outpatient, no plans for surgery while inpatient.     Pertinent Diagnoses:  Elevated temperatures, persistent  Group A Strep bacteremia  Left leg cellulitis, improved  Left foot chronic osteomyelitis  Left foot tenosynovitis  Left-sided tinea cruris  Rheumatoid arthritis  Chronic foot deformities, chronic bilateral foot ulcers  Immunosuppressed status    Plan:  -Continue IV Ancef 2 g every 8 hours  -Repeat blood cultures x2 from 2/28 negative  -Fever spikes continue despite improvement of the anterior left leg cellulitis, concerning for underlying purulence that may need surgical intervention sooner than previously planned.  There is persistent swelling, erythema, increased temperature, tenderness especially with foot dorsiflexion, of the posterior distal left leg.  Repeat MRI scan with contrast revealing cellulitis, myositis, some fluid in the foot, prominent tenosynovitis of the tibialis posterior tendon, flexor digitorum longus and hallucis, consistent with the physical exam findings. This is new compared to previous MRI findings and can explain the persistence of sepsis.  Recommend orthopedics reevaluation for consideration of surgical intervention     Disposition: Anticipate no ID specific  disposition needs  Need for PICC line: No     Plan was discussed with the primary team, Dr. Dias

## 2023-03-06 NOTE — DISCHARGE PLANNING
Case Management Discharge Planning    Admission Date: 2/28/2023  GMLOS: 5  ALOS: 6    6-Clicks ADL Score: 17  6-Clicks Mobility Score: 7  PT and/or OT Eval ordered: Yes  Post-acute Referrals Ordered: No  Post-acute Choice Obtained: No  Has referral(s) been sent to post-acute provider:  No      Anticipated Discharge Dispo: SNF vs     DME Needed: pending assessment     Action(s) Taken: OTHER    Escalations Completed: None    Medically Clear: No    Next Steps:  f/u with pt and medical team to discuss dc needs and barriers.    Barriers to Discharge: Medical clearance    RNCM to complete DC planning assessment today

## 2023-03-07 NOTE — PROGRESS NOTES
Hospital Medicine Daily Progress Note    Date of Service  3/7/2023    Chief Complaint  Richard Hubbard II is a 47 y.o. male admitted 2/28/2023 with cellulitis and sepsis    Hospital Course  Richard Hubbard II is a 47 y.o. male who presented 2/28/2023 with history of rheumatoid arthritis on Humira with history of planovalgus deformities and foot ulcers with recent MRI concerning for osteomyelitis of the left navicular bone presented to the emergency department for evaluation of confusion and fever of 103 associated with hypoglycemia and worsening redness of the lower extremities more so on the left lower extremity.  He has a history of chronic pain syndrome with narcotic dependence.  The patient is not diabetic and has not had any prior history of hypoglycemia.  According to the wife he has been in the past on prednisone for rheumatoid arthritis flares but has not taken any over the past month.  No headache no loss of consciousness.  Patient was noted to be hypotensive and hypoglycemic received dextrose infusion antibiotics and fluids resuscitation and admitted to IMCU.  Blood cultures grew group A strep    ID recommendations  If cellulitis improvement continues, anticipate completion of sepsis treatment with p.o. linezolid 600 mg twice daily through 3/9/2023, followed by switch to p.o. amoxicillin 1 g twice daily for suppression in order to prevent recurrent sepsis while awaiting surgical intervention of the foot.      Interval Problem Update    Tmax 101.1  's   Improved p.o. intake I have ordered supplements  Started on Diflucan and clotrimazole for perineal rash  Discussed with Dr. Galdamez  he recommends transitioning to p.o. linezolid on discharge through 3/9/2023 followed by p.o. amoxicillin 1 g twice daily for 3 to 4 weeks and outpatient follow-up with ID and orthopedics    3/5/2023:  Appreciate infectious disease recommendations patient to undergo MRI today of left lower extremity from  knee joint through foot.  Concern based on repeat febrile episodes merit represent occult abscess.  Otherwise continue present antimicrobial treatment.  Duration antimicrobials appreciated as indicated above.  No acute events overnight continues to have febrile episodes.    3/6/2023:  Patient had MRI performed of the left knee through foot yesterday with new findings of tenosynovitis which may be the explanation for his persistent elevated temperatures and mild leukocytosis.  Appreciate orthopedic surgery recommendations appreciate ID recommendations.  Patient being held n.p.o. at midnight tonight for possible surgical intervention on 3/7/2023.  Otherwise continue present antimicrobials continue present medication therapy continue electrolyte repletion as needed.  3/7: Patient seen and examined, afebrile, no nausea or vomiting. WBC trending up    Ortho following and plan is for surgical intervention today   ID also following cont on IV abx as per ID rec.   I have discussed this patient's plan of care and discharge plan at IDT rounds today with Case Management, Nursing, Nursing leadership, and other members of the IDT team.    Consultants/Specialty  critical care    Code Status  Full Code    Disposition  Patient is not medically cleared for discharge.   Anticipate discharge to to home with close outpatient follow-up.  I have placed the appropriate orders for post-discharge needs.    Review of Systems  Review of Systems   Constitutional:  Positive for malaise/fatigue. Negative for fever.   Respiratory:  Negative for shortness of breath.    Cardiovascular:  Negative for chest pain, palpitations and orthopnea.   Genitourinary:  Negative for dysuria, frequency and urgency.   Musculoskeletal:  Positive for joint pain.   Skin:  Positive for rash.   All other systems reviewed and are negative.     Physical Exam  Temp:  [37.4 °C (99.3 °F)-37.5 °C (99.5 °F)] 37.4 °C (99.3 °F)  Pulse:  [70-95] 80  Resp:  [16-18] 18  BP:  ()/(62-75) 111/75  SpO2:  [91 %-92 %] 92 %    Physical Exam  Vitals and nursing note reviewed.   Constitutional:       Appearance: He is well-developed. He is not diaphoretic.   HENT:      Head: Normocephalic and atraumatic.      Mouth/Throat:      Pharynx: No oropharyngeal exudate.   Eyes:      General: No scleral icterus.        Right eye: No discharge.         Left eye: No discharge.      Pupils: Pupils are equal, round, and reactive to light.   Neck:      Vascular: No JVD.      Trachea: No tracheal deviation.   Cardiovascular:      Rate and Rhythm: Normal rate and regular rhythm.      Heart sounds: No murmur heard.    No friction rub. No gallop.   Pulmonary:      Effort: Pulmonary effort is normal. No respiratory distress.      Breath sounds: Normal breath sounds. No stridor. No wheezing.   Chest:      Chest wall: No tenderness.   Abdominal:      General: There is no distension.      Palpations: Abdomen is soft.      Tenderness: There is no abdominal tenderness. There is no rebound.      Hernia: A hernia (Soft and reducible) is present.   Musculoskeletal:         General: Swelling and tenderness present.      Cervical back: Neck supple.   Skin:     General: Skin is warm.      Findings: Erythema present.      Nails: There is no clubbing.      Comments: Left scrotal perineal rash    Left lower extremity erythema improved   Neurological:      Mental Status: He is alert and oriented to person, place, and time.      Cranial Nerves: No cranial nerve deficit.      Motor: No weakness or abnormal muscle tone.   Psychiatric:         Behavior: Behavior normal.       Fluids    Intake/Output Summary (Last 24 hours) at 3/7/2023 1328  Last data filed at 3/7/2023 0729  Gross per 24 hour   Intake 1340 ml   Output 3600 ml   Net -2260 ml       Laboratory  Recent Labs     03/05/23  0013 03/07/23  0052   WBC 11.2* 20.8*   RBC 3.89* 3.58*   HEMOGLOBIN 11.2* 10.5*   HEMATOCRIT 33.4* 30.8*   MCV 85.9 86.0   MCH 28.8 29.3   MCHC  33.5* 34.1   RDW 47.9 47.2   PLATELETCT 94* 152*   MPV 10.2 10.4     Recent Labs     03/05/23  0013 03/07/23  0052   SODIUM 135 128*   POTASSIUM 3.4* 4.3   CHLORIDE 101 98   CO2 25 21   GLUCOSE 90 92   BUN 5* 8   CREATININE 0.52 0.45*   CALCIUM 7.0* 6.8*                     Imaging  [id number] & W/O LEFT   Final Result      1.  No evidence of left tibial or fibular osteomyelitis or fracture.      2.  Diffuse cellulitis      3.  Atrophy of the muscles of the lower leg with edema and patchy enhancement consistent with myositis. This involves the tibialis posterior muscle to the greatest degree.      4.  Some fluid seen superficial to the medial head of the gastrocnemius muscle. There is no evidence of rim enhancement to suggest presence of abscess.      MR-FOOT-WITH & W/O LEFT   Final Result      1.  Again seen marrow edema involving the medial aspect of the navicular and the adjacent first cuneiform with some enhancement of that area with overlying soft tissue ulceration and some fluid within the overlying soft tissues consistent with    osteomyelitis versus reactive change. This is unchanged from comparison.      2.  Marrow edema with small nondisplaced trabecular fracture involving the first metatarsal head with enhancement of that area. Differential diagnosis includes nondisplaced stress/insufficiency fracture versus osteomyelitis.      3.  Again seen marrow edema involving the base of the second metatarsal with some abnormal enhancement with a small possible linear nondisplaced trabecular fracture extending through that area likely representing a small nondisplaced insufficiency    fracture. There is also arthropathy of that joint.      4.  Again seen marrow edema involving the cuneiforms and the adjacent metatarsals likely representing osteoarthritis.      5.  Osteoarthritis of the tibiotalar joint with chronic osteochondral injury of the lateral aspect of the talar dome. There is also osteoarthritis  of the subtalar, talonavicular and calcaneocuboid articulations.      6.  Cellulitis involving the hindfoot most prominently medially.      7.  Prominent tenosynovitis of the tibialis posterior tendon and to a lesser degree the flexor digitorum longus and flexor hallucis tendons. This underlies the area of soft tissue ulceration and could potentially represent infectious tenosynovitis. This    is new from comparison.      8.  Pes planus and valgus deformity of the hindfoot.      CT-EXTREMITY, LOWER WITH LEFT   Final Result      1.  No evidence of acute osteomyelitis of the left lower extremity.      2.  No evidence of deep abscess formation in the subcutaneous tissues.      US-EXTREMITY VENOUS LOWER UNILAT LEFT   Final Result      DX-TIBIA AND FIBULA LEFT   Final Result      1.  Diffuse soft tissue swelling, edema and/or cellulitis.   2.  No acute osseous abnormality.   3.  Osteopenia.      DX-FOOT-2- LEFT   Final Result      Severe osteopenia and flat foot deformity without evidence of acute displaced fracture      No definite radiographic evidence of osteomyelitis.      CT-HEAD W/O   Final Result      Head CT without contrast within normal limits. No evidence of acute cerebral infarction, hemorrhage or mass lesion.         DX-CHEST-PORTABLE (1 VIEW)   Final Result      No acute cardiac or pulmonary abnormalities are identified.             Assessment/Plan  * Sepsis (HCC)- (present on admission)  Assessment & Plan  Sepsis secondary to group A strep cellulitis with bacteremia    Continue cefazolin cefazolin  CT negative for abscess  Duplex negative for DVT x-rays negative for acute bony pathology  Patient evaluated by orthopedics with nonsurgical management recommended and outpatient follow-up with Dr. Pryor  Continue wound care  Evaluated by ID              Tenosynovitis of left lower leg  Assessment & Plan  Findings on MRI infectious these recommendations appreciated.  Patient has been in reevaluated by  orthopedic surgery appreciate their recommendations patient be held n.p.o. tonight for possible surgical intervention on 3/7/2023.  Present antimicrobials.    Yeast dermatitis  Assessment & Plan  Started on Diflucan and topical clotrimazole  Skin care    Electrolyte abnormality  Assessment & Plan  Hypokalemia  Hypomagnesemia  Hypophosphatemia  Hypocalcemia    Likely component of refeeding syndrome patient started on thiamine supplements    Replete with K-Phos and IV magnesium sulfate   Start calcium citrate supplements   Monitor electrolytes    Thrombocytopenia (HCC)  Assessment & Plan  Platelet counts 85K likely reactive secondary to sepsis  Monitor CBC        Hypoglycemia  Assessment & Plan  Likely secondary to poor intake  Hypoglycemia protocol  Continue D5 LR infusion overnight and if p.o. intake remains stable will DC in a.m.  Monitor CBGs    Ulcer of both feet (HCC)- (present on admission)  Assessment & Plan  Wound care  CT negative for abscess  MRI concerning for osteomyelitis patient evaluated by LPS continue wound care  Continue Ancef  Discussed with ID Dr. Galdamez recommends transitioning to p.o. Zyvox on discharge through 3/9/2023 followed by p.o. amoxicillin 1 g twice daily for 3 to 4 weeks and outpatient follow-up with ID and orthopedics  MRI performed of the left knee through foot yesterday with new findings of tenosynovitis which may be the explanation for his persistent elevated temperatures and mild leukocytosis.  Ortho planing for surgical intervention today   ID following and will cont on IV abx for now     Chronic use of opiate drug for therapeutic purpose- (present on admission)  Assessment & Plan  With narcotic dependence    Continue home dose of morphine sulfate    History of immunosuppressive therapy- (present on admission)  Assessment & Plan  On Humira and intermittent steroids    Rheumatoid arthritis (HCC)- (present on admission)  Assessment & Plan  Has been maintained on Humira and  intermittently on prednisone although no recent steroid use  Continue to hold Humira  Cortisol level 45    tapered off hydrocortisone    History of bleeding ulcers  Assessment & Plan  Continue prilosec  Patient declines pharmacologic DVT prophylaxis discussed RBA       Please note that this dictation was created using voice recognition software. I have made every reasonable attempt to correct obvious errors, but I expect that there are errors of grammar and possibly context that I did not discover before finalizing the note.    VTE prophylaxis: SCDs/TEDsNote patient reports history of severe gastrointestinal bleed with any anticoagulant per patient request no anticoagulant for DVT prophylaxis ordered    I have performed a physical exam and reviewed and updated ROS and Plan today (3/7/2023). In review of yesterday's note (3/6/2023), there are no changes except as documented above.

## 2023-03-07 NOTE — DIETARY
NUTRITION SERVICES:   Day 7 of admit.  Richard Hubbard II is a 47 y.o. male with admitting DX of Sepsis.     Pt with BMI >40 (=Body mass index is 41.44 kg/m².), Class III obesity. Weight loss counseling not appropriate in acute care setting. RECOMMEND - If appropriate at DC please refer to outpatient nutrition services for weight management.       Pt with PI to perineum, full thickness wounds to bilateral feet and noted need for BKA per MD note. Pt currently NPO. Previously on regular diet with variable intake noted on weekly RD screen. Overall intake averaging ~50%.  Recommend addition of Boost nutritional supplements when diet advanced per MD with all meals to help bolster nutrition and aid in wound healing while in acute care. while in acute care.     Problem: Nutritional:  Goal: Achieve adequate nutritional intake  Description: Patient will consume >50% of meals and supplements  Outcome: Progressing     RD to monitor per department policy for adequate intake.

## 2023-03-07 NOTE — PROGRESS NOTES
Hospital Medicine Daily Progress Note    Date of Service  3/6/2023    Chief Complaint  Richard Hubbard II is a 47 y.o. male admitted 2/28/2023 with cellulitis and sepsis    Hospital Course  Richard Hubbard II is a 47 y.o. male who presented 2/28/2023 with history of rheumatoid arthritis on Humira with history of planovalgus deformities and foot ulcers with recent MRI concerning for osteomyelitis of the left navicular bone presented to the emergency department for evaluation of confusion and fever of 103 associated with hypoglycemia and worsening redness of the lower extremities more so on the left lower extremity.  He has a history of chronic pain syndrome with narcotic dependence.  The patient is not diabetic and has not had any prior history of hypoglycemia.  According to the wife he has been in the past on prednisone for rheumatoid arthritis flares but has not taken any over the past month.  No headache no loss of consciousness.  Patient was noted to be hypotensive and hypoglycemic received dextrose infusion antibiotics and fluids resuscitation and admitted to IMCU.  Blood cultures grew group A strep    ID recommendations  If cellulitis improvement continues, anticipate completion of sepsis treatment with p.o. linezolid 600 mg twice daily through 3/9/2023, followed by switch to p.o. amoxicillin 1 g twice daily for suppression in order to prevent recurrent sepsis while awaiting surgical intervention of the foot.      Interval Problem Update    Tmax 101.1  's   Improved p.o. intake I have ordered supplements  Started on Diflucan and clotrimazole for perineal rash  Discussed with Dr. Galdamez  he recommends transitioning to p.o. linezolid on discharge through 3/9/2023 followed by p.o. amoxicillin 1 g twice daily for 3 to 4 weeks and outpatient follow-up with ID and orthopedics    3/5/2023:  Appreciate infectious disease recommendations patient to undergo MRI today of left lower extremity from  knee joint through foot.  Concern based on repeat febrile episodes merit represent occult abscess.  Otherwise continue present antimicrobial treatment.  Duration antimicrobials appreciated as indicated above.  No acute events overnight continues to have febrile episodes.    3/6/2023:  Patient had MRI performed of the left knee through foot yesterday with new findings of tenosynovitis which may be the explanation for his persistent elevated temperatures and mild leukocytosis.  Appreciate orthopedic surgery recommendations appreciate ID recommendations.  Patient being held n.p.o. at midnight tonight for possible surgical intervention on 3/7/2023.  Otherwise continue present antimicrobials continue present medication therapy continue electrolyte repletion as needed.  I have discussed this patient's plan of care and discharge plan at IDT rounds today with Case Management, Nursing, Nursing leadership, and other members of the IDT team.    Consultants/Specialty  critical care    Code Status  Full Code    Disposition  Patient is not medically cleared for discharge.   Anticipate discharge to to home with close outpatient follow-up.  I have placed the appropriate orders for post-discharge needs.    Review of Systems  Review of Systems   Constitutional:  Positive for malaise/fatigue. Negative for fever.   Respiratory:  Negative for shortness of breath.    Cardiovascular:  Negative for chest pain.   Musculoskeletal:  Positive for joint pain.   Skin:  Positive for rash.   All other systems reviewed and are negative.     Physical Exam  Temp:  [36.7 °C (98 °F)-37.5 °C (99.5 °F)] 37.5 °C (99.5 °F)  Pulse:  [82-95] 95  Resp:  [18] 18  BP: (105-109)/(72-75) 107/73  SpO2:  [91 %-95 %] 92 %    Physical Exam  Vitals and nursing note reviewed.   Constitutional:       Appearance: He is well-developed. He is not diaphoretic.   HENT:      Head: Normocephalic and atraumatic.      Mouth/Throat:      Pharynx: No oropharyngeal exudate.   Eyes:       General: No scleral icterus.        Right eye: No discharge.         Left eye: No discharge.      Pupils: Pupils are equal, round, and reactive to light.   Neck:      Vascular: No JVD.      Trachea: No tracheal deviation.   Cardiovascular:      Rate and Rhythm: Normal rate and regular rhythm.      Heart sounds: No murmur heard.    No friction rub. No gallop.   Pulmonary:      Effort: Pulmonary effort is normal. No respiratory distress.      Breath sounds: Normal breath sounds. No stridor. No wheezing.   Chest:      Chest wall: No tenderness.   Abdominal:      General: There is no distension.      Palpations: Abdomen is soft.      Tenderness: There is no abdominal tenderness. There is no rebound.      Hernia: A hernia (Soft and reducible) is present.   Musculoskeletal:         General: Swelling and tenderness present.      Cervical back: Neck supple.   Skin:     General: Skin is warm.      Findings: Erythema present.      Nails: There is no clubbing.      Comments: Left scrotal perineal rash    Left lower extremity erythema improved   Neurological:      Mental Status: He is alert and oriented to person, place, and time.      Cranial Nerves: No cranial nerve deficit.      Motor: No abnormal muscle tone.   Psychiatric:         Behavior: Behavior normal.       Fluids    Intake/Output Summary (Last 24 hours) at 3/6/2023 1700  Last data filed at 3/6/2023 1500  Gross per 24 hour   Intake 600 ml   Output 3100 ml   Net -2500 ml       Laboratory  Recent Labs     03/04/23  0319 03/05/23  0013   WBC 10.7 11.2*   RBC 4.12* 3.89*   HEMOGLOBIN 11.8* 11.2*   HEMATOCRIT 35.1* 33.4*   MCV 85.2 85.9   MCH 28.6 28.8   MCHC 33.6* 33.5*   RDW 47.8 47.9   PLATELETCT 85* 94*   MPV 10.7 10.2     Recent Labs     03/04/23  0319 03/05/23  0013   SODIUM 136  136 135   POTASSIUM 3.2*  3.2* 3.4*   CHLORIDE 102  102 101   CO2 23 23 25   GLUCOSE 86  86 90   BUN 6*  6* 5*   CREATININE 0.59  0.59 0.52   CALCIUM 6.8*  6.8* 7.0*                      Imaging  [id number] & W/O LEFT   Final Result      1.  No evidence of left tibial or fibular osteomyelitis or fracture.      2.  Diffuse cellulitis      3.  Atrophy of the muscles of the lower leg with edema and patchy enhancement consistent with myositis. This involves the tibialis posterior muscle to the greatest degree.      4.  Some fluid seen superficial to the medial head of the gastrocnemius muscle. There is no evidence of rim enhancement to suggest presence of abscess.      MR-FOOT-WITH & W/O LEFT   Final Result      1.  Again seen marrow edema involving the medial aspect of the navicular and the adjacent first cuneiform with some enhancement of that area with overlying soft tissue ulceration and some fluid within the overlying soft tissues consistent with    osteomyelitis versus reactive change. This is unchanged from comparison.      2.  Marrow edema with small nondisplaced trabecular fracture involving the first metatarsal head with enhancement of that area. Differential diagnosis includes nondisplaced stress/insufficiency fracture versus osteomyelitis.      3.  Again seen marrow edema involving the base of the second metatarsal with some abnormal enhancement with a small possible linear nondisplaced trabecular fracture extending through that area likely representing a small nondisplaced insufficiency    fracture. There is also arthropathy of that joint.      4.  Again seen marrow edema involving the cuneiforms and the adjacent metatarsals likely representing osteoarthritis.      5.  Osteoarthritis of the tibiotalar joint with chronic osteochondral injury of the lateral aspect of the talar dome. There is also osteoarthritis of the subtalar, talonavicular and calcaneocuboid articulations.      6.  Cellulitis involving the hindfoot most prominently medially.      7.  Prominent tenosynovitis of the tibialis posterior tendon and to a lesser degree the flexor digitorum longus and flexor  hallucis tendons. This underlies the area of soft tissue ulceration and could potentially represent infectious tenosynovitis. This    is new from comparison.      8.  Pes planus and valgus deformity of the hindfoot.      CT-EXTREMITY, LOWER WITH LEFT   Final Result      1.  No evidence of acute osteomyelitis of the left lower extremity.      2.  No evidence of deep abscess formation in the subcutaneous tissues.      US-EXTREMITY VENOUS LOWER UNILAT LEFT   Final Result      DX-TIBIA AND FIBULA LEFT   Final Result      1.  Diffuse soft tissue swelling, edema and/or cellulitis.   2.  No acute osseous abnormality.   3.  Osteopenia.      DX-FOOT-2- LEFT   Final Result      Severe osteopenia and flat foot deformity without evidence of acute displaced fracture      No definite radiographic evidence of osteomyelitis.      CT-HEAD W/O   Final Result      Head CT without contrast within normal limits. No evidence of acute cerebral infarction, hemorrhage or mass lesion.         DX-CHEST-PORTABLE (1 VIEW)   Final Result      No acute cardiac or pulmonary abnormalities are identified.             Assessment/Plan  * Sepsis (HCC)- (present on admission)  Assessment & Plan  Sepsis secondary to group A strep cellulitis with bacteremia    Continue cefazolin cefazolin  CT negative for abscess  Duplex negative for DVT x-rays negative for acute bony pathology  Patient evaluated by orthopedics with nonsurgical management recommended and outpatient follow-up with Dr. Pryor  Continue wound care  Evaluated by ID              Tenosynovitis of left lower leg  Assessment & Plan  Findings on MRI infectious these recommendations appreciated.  Patient has been in reevaluated by orthopedic surgery appreciate their recommendations patient be held n.p.o. tonight for possible surgical intervention on 3/7/2023.  Present antimicrobials.    Yeast dermatitis  Assessment & Plan  Started on Diflucan and topical clotrimazole  Skin care    Electrolyte  abnormality  Assessment & Plan  Hypokalemia  Hypomagnesemia  Hypophosphatemia  Hypocalcemia    Likely component of refeeding syndrome patient started on thiamine supplements    Replete with K-Phos and IV magnesium sulfate   Start calcium citrate supplements   Monitor electrolytes    Thrombocytopenia (HCC)  Assessment & Plan  Platelet counts 85K likely reactive secondary to sepsis  Monitor CBC        Hypoglycemia  Assessment & Plan  Likely secondary to poor intake  Hypoglycemia protocol  Continue D5 LR infusion overnight and if p.o. intake remains stable will DC in a.m.  Monitor CBGs    Ulcer of both feet (HCC)- (present on admission)  Assessment & Plan  Wound care  CT negative for abscess  MRI concerning for osteomyelitis patient evaluated by LPS continue wound care  Continue Ancef  Discussed with ID Dr. Galdamez recommends transitioning to p.o. Zyvox on discharge through 3/9/2023 followed by p.o. amoxicillin 1 g twice daily for 3 to 4 weeks and outpatient follow-up with ID and orthopedics    Chronic use of opiate drug for therapeutic purpose- (present on admission)  Assessment & Plan  With narcotic dependence    Continue home dose of morphine sulfate    History of immunosuppressive therapy- (present on admission)  Assessment & Plan  On Humira and intermittent steroids    Rheumatoid arthritis (HCC)- (present on admission)  Assessment & Plan  Has been maintained on Humira and intermittently on prednisone although no recent steroid use  Continue to hold Humira  Cortisol level 45    tapered off hydrocortisone    History of bleeding ulcers  Assessment & Plan  Continue prilosec  Patient declines pharmacologic DVT prophylaxis discussed RBA       Please note that this dictation was created using voice recognition software. I have made every reasonable attempt to correct obvious errors, but I expect that there are errors of grammar and possibly context that I did not discover before finalizing the note.    VTE prophylaxis:  SCDs/TEDsNote patient reports history of severe gastrointestinal bleed with any anticoagulant per patient request no anticoagulant for DVT prophylaxis ordered    I have performed a physical exam and reviewed and updated ROS and Plan today (3/6/2023). In review of yesterday's note (3/5/2023), there are no changes except as documented above.

## 2023-03-07 NOTE — CONSULTS
"    Physical Medicine and Rehabilitation Consultation          Date of initial consultation: 3/7/2023  Consulting provider: Corie Aranda M.D.  Reason for consultation: assess for acute inpatient rehab appropriateness  LOS: 7 Day(s)    Chief complaint: Left foot osteomyelitis    HPI: The patient is a 47 y.o. right hand dominant male with a past medical history of reported arthritis on Humira with deformities and foot ulcers;  who presented on 2/28/2023 10:51 AM with MRI evidence of osteomyelitis of the left navicular bone fever of 103, hypoglycemia, altered mental status and cellulitis.  Patient was seen by orthopedic surgery, LPS and infectious disease.  He is currently on cefazolin 3 times daily through 3/12/2023.  Patient went marked left knee through foot yesterday which found tenosynovitis.  Patient will undergo possible surgical invention on 3/7    Chart review reveals that Mr. Hubbard is a prior patient of [facility], with his most recent admission in July 2020, where he attended rehab following right knee replacement and was successfully discharged home after 11 days.    The patient currently reports nausea, vomiting, chest pain, diarrhea, shortness of breath, 8 out of 5 pain in \"every joint\" most significant in his left shoulder, right wrist, right knee.  Patient has been n.p.o. today and is expected to go to surgery, he is unsure if he will be receiving a BKA or not today.  He understands this is a possibility.  Patient is very weak today and is quite painful.  Patient reports that typically he gets around with a four-wheel walker just fine, takes care of 2 kids, does chores etc.    ROS  Pertinent positives are mentioned in the HPI, all others reviewed and are negative.    Social Hx:  1 SH  2 HASEEB  With: Spouse and 2 children ages 4 and 7.  Spouse works for [employer] as a pediatric psychologist.     Tobacco: Half pack per day  Alcohol: Denies  Drugs: Denies    THERAPY:  Restrictions: Fall risk  PT: " Functional mobility   3/3: Max assist sit to stand    OT: ADLs  3/3: Max assist lower body dressing    SLP:   None    IMAGING:  MR foot 3/5/23  1.  Again seen marrow edema involving the medial aspect of the navicular and the adjacent first cuneiform with some enhancement of that area with overlying soft tissue ulceration and some fluid within the overlying soft tissues consistent with   osteomyelitis versus reactive change. This is unchanged from comparison.     2.  Marrow edema with small nondisplaced trabecular fracture involving the first metatarsal head with enhancement of that area. Differential diagnosis includes nondisplaced stress/insufficiency fracture versus osteomyelitis.     3.  Again seen marrow edema involving the base of the second metatarsal with some abnormal enhancement with a small possible linear nondisplaced trabecular fracture extending through that area likely representing a small nondisplaced insufficiency   fracture. There is also arthropathy of that joint.     4.  Again seen marrow edema involving the cuneiforms and the adjacent metatarsals likely representing osteoarthritis.     5.  Osteoarthritis of the tibiotalar joint with chronic osteochondral injury of the lateral aspect of the talar dome. There is also osteoarthritis of the subtalar, talonavicular and calcaneocuboid articulations.     6.  Cellulitis involving the hindfoot most prominently medially.     7.  Prominent tenosynovitis of the tibialis posterior tendon and to a lesser degree the flexor digitorum longus and flexor hallucis tendons. This underlies the area of soft tissue ulceration and could potentially represent infectious tenosynovitis. This   is new from comparison.     8.  Pes planus and valgus deformity of the hindfoot.      MR tib-fib 3/5/2023  1.  No evidence of left tibial or fibular osteomyelitis or fracture.  2.  Diffuse cellulitis  3.  Atrophy of the muscles of the lower leg with edema and patchy enhancement  consistent with myositis. This involves the tibialis posterior muscle to the greatest degree.  4.  Some fluid seen superficial to the medial head of the gastrocnemius muscle. There is no evidence of rim enhancement to suggest presence of abscess.    PROCEDURES:  None    PMH:  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    Hemorrhagic disorder ([facility])     hx of bleeding ulcers    History of bleeding ulcers 2/12/2015    Hypertension 05/03/2021    pt states well controlled on meds    Insomnia 5/21/2009    Migraine 5/21/2009    Morning headache     Obesity, morbid ([facility]) 5/21/2009    Osteomyelitis ([facility]) 10/7/2019    Pain 09/2019    Chronic knee and back pain    Pubic ramus fracture ([facility]) 9/28/2019    Rheumatoid arteritis ([facility]) 09/2019    knee, feet right hand    Sleep apnea 5/21/2009    Did not tolerate cpap, does not use it    Snoring        PSH:  Past Surgical History:   Procedure Laterality Date    PB TOTAL KNEE ARTHROPLASTY Right 7/22/2020    Procedure: ARTHROPLASTY, KNEE, TOTAL;  Surgeon: Temo Pires M.D.;  Location: SURGERY [facility];  Service: Orthopedics    TENDON TRANSFER Right 1/22/2020    Procedure: RIGHT DISTAL ULNA RESECTION AND EXTENSOR TENDON TRANSFER;  Surgeon: Marine Rushing M.D.;  Location: SURGERY [facility];  Service: Orthopedics    OTHER ORTHOPEDIC SURGERY Right 2020    total right knee replacement     "IRRIGATION & DEBRIDEMENT ORTHO Left 10/9/2019    Procedure: IRRIGATION AND DEBRIDEMENT, WOUND - PELVIC OSTEOMYELITIS;  Surgeon: You Olivares M.D.;  Location: SURGERY [facility];  Service: Orthopedics    OTHER SURGICAL PROCEDURE  2019    osteomelitis of pelvis cleaned    GASTRIC BYPASS LAPAROSCOPIC  2000    Lucila en y    CHOLECYSTECTOMY  2000    OTHER SURGICAL PROCEDURE      repair of broken penis       FHX:    Family History   Problem Relation Age of Onset    Hypertension Mother     Arthritis Mother     Depression Mother     Cancer Father         bladder    Schizophrenia Father     Cancer Maternal Grandmother         breast    Heart Disease Maternal Grandmother     Psychiatric Illness Other     Stroke Maternal Aunt     Other Neg Hx         no connective tissue disorders or known aortic disease       Medications:  Current Facility-Administered Medications   Medication Dose    HYDROmorphone (Dilaudid) injection 0.5 mg  0.5 mg    ketoconazole (NIZORAL) 2 % cream      morphine (MS IR) tablet 30 mg  30 mg    calcium citrate (CALCITRATE) tablet 950 mg  950 mg    thiamine (Vitamin B-1) tablet 100 mg  100 mg    zolpidem (AMBIEN) tablet 5 mg  5 mg    nicotine (NICODERM) 7 MG/24HR 7 mg  7 mg    D5LR infusion      ceFAZolin (Ancef) 2 g in  mL IVPB  2 g    morphine ER (MS CONTIN) tablet 15 mg  15 mg    omeprazole (PRILOSEC) capsule 40 mg  40 mg    senna-docusate (PERICOLACE or SENOKOT S) 8.6-50 MG per tablet 2 Tablet  2 Tablet    And    polyethylene glycol/lytes (MIRALAX) PACKET 1 Packet  1 Packet    And    magnesium hydroxide (MILK OF MAGNESIA) suspension 30 mL  30 mL    And    bisacodyl (DULCOLAX) suppository 10 mg  10 mg    lactated ringers infusion (BOLUS)  500 mL    acetaminophen (Tylenol) tablet 650 mg  650 mg    Pharmacy Consult Request ...Pain Management Review 1 Each  1 Each    dextrose 10 % BOLUS 25 g  25 g       Allergies:  Allergies   Allergen Reactions    Benadryl [Altaryl]      \"I get agitated\"    " "Nsaids      Bleeding, \"I had a bleeding ulcer\"    Phenergan [Promethazine Hcl]      \"I get very agitated\"    Penicillins      \"Had some dizziness\"  Tolerated Unasyn 10/2019  Tolerated Zosyn 02/2023         Physical Exam:  Vitals: /75   Pulse 80   Temp 37.4 °C (99.3 °F) (Temporal)   Resp 18   Ht 1.575 m (5' 2.01\")   Wt 103 kg (226 lb 10.1 oz)   SpO2 92%   Gen: NAD  Head: NC/AT  Eyes/ Nose/ Mouth: PERRLA, moist mucous membranes  Cardio: RRR, good distal perfusion, warm extremities  Pulm: normal respiratory effort, no cyanosis   Abd: Soft NTND, negative borborygmi   Ext: No peripheral edema. No calf tenderness. No clubbing.    Mental status: answers questions appropriately follows commands  Speech: fluent, no aphasia or dysarthria    Motor:      Upper Extremity  Myotome R L   Shoulder flexion C5 4-/5 4-/5   Elbow flexion C5 4-/5 4-/5   Wrist extension C6 4-/5 4-/5   Elbow extension C7 4-/5 4-/5   Finger flexion C8 4-/5 4-/5   Finger abduction T1 4-/5 4-/5     Lower Extremity Myotome R L   Hip flexion L2 2/5 2/5   Knee extension L3 2/5 2/5   Ankle dorsiflexion L4 2/5 2/5   Toe extension L5 2/5 2/5   Ankle plantarflexion S1 2/5 2/5       Sensory:   intact to light touch through out    Labs: Reviewed and significant for   Recent Labs     03/05/23  0013 03/07/23  0052   RBC 3.89* 3.58*   HEMOGLOBIN 11.2* 10.5*   HEMATOCRIT 33.4* 30.8*   PLATELETCT 94* 152*     Recent Labs     03/05/23  0013 03/07/23  0052   SODIUM 135 128*   POTASSIUM 3.4* 4.3   CHLORIDE 101 98   CO2 25 21   GLUCOSE 90 92   BUN 5* 8   CREATININE 0.52 0.45*   CALCIUM 7.0* 6.8*     Recent Results (from the past 24 hour(s))   POCT glucose device results    Collection Time: 03/06/23  5:10 PM   Result Value Ref Range    POC Glucose, Blood 84 65 - 99 mg/dL   POCT glucose device results    Collection Time: 03/06/23  8:20 PM   Result Value Ref Range    POC Glucose, Blood 87 65 - 99 mg/dL   POCT glucose device results    Collection Time: 03/07/23 12:10 " AM   Result Value Ref Range    POC Glucose, Blood 96 65 - 99 mg/dL   CBC WITH DIFFERENTIAL    Collection Time: 03/07/23 12:52 AM   Result Value Ref Range    WBC 20.8 (H) 4.8 - 10.8 K/uL    RBC 3.58 (L) 4.70 - 6.10 M/uL    Hemoglobin 10.5 (L) 14.0 - 18.0 g/dL    Hematocrit 30.8 (L) 42.0 - 52.0 %    MCV 86.0 81.4 - 97.8 fL    MCH 29.3 27.0 - 33.0 pg    MCHC 34.1 33.7 - 35.3 g/dL    RDW 47.2 35.9 - 50.0 fL    Platelet Count 152 (L) 164 - 446 K/uL    MPV 10.4 9.0 - 12.9 fL    Neutrophils-Polys 87.00 (H) 44.00 - 72.00 %    Lymphocytes 4.30 (L) 22.00 - 41.00 %    Monocytes 6.90 0.00 - 13.40 %    Eosinophils 0.90 0.00 - 6.90 %    Basophils 0.00 0.00 - 1.80 %    Nucleated RBC 0.00 /100 WBC    Neutrophils (Absolute) 18.10 (H) 1.82 - 7.42 K/uL    Lymphs (Absolute) 0.89 (L) 1.00 - 4.80 K/uL    Monos (Absolute) 1.44 (H) 0.00 - 0.85 K/uL    Eos (Absolute) 0.19 0.00 - 0.51 K/uL    Baso (Absolute) 0.00 0.00 - 0.12 K/uL    NRBC (Absolute) 0.00 K/uL    Anisocytosis 1+     Macrocytosis 1+    Basic Metabolic Panel    Collection Time: 03/07/23 12:52 AM   Result Value Ref Range    Sodium 128 (L) 135 - 145 mmol/L    Potassium 4.3 3.6 - 5.5 mmol/L    Chloride 98 96 - 112 mmol/L    Co2 21 20 - 33 mmol/L    Glucose 92 65 - 99 mg/dL    Bun 8 8 - 22 mg/dL    Creatinine 0.45 (L) 0.50 - 1.40 mg/dL    Calcium 6.8 (LL) 8.5 - 10.5 mg/dL    Anion Gap 9.0 7.0 - 16.0   ESTIMATED GFR    Collection Time: 03/07/23 12:52 AM   Result Value Ref Range    GFR (CKD-EPI) 131 >60 mL/min/1.73 m 2   PERIPHERAL SMEAR REVIEW    Collection Time: 03/07/23 12:52 AM   Result Value Ref Range    Peripheral Smear Review see below    PLATELET ESTIMATE    Collection Time: 03/07/23 12:52 AM   Result Value Ref Range    Plt Estimation Decreased    MORPHOLOGY    Collection Time: 03/07/23 12:52 AM   Result Value Ref Range    RBC Morphology Present    DIFFERENTIAL MANUAL    Collection Time: 03/07/23 12:52 AM   Result Value Ref Range    Myelocytes 0.90 %    Manual Diff Status  PERFORMED    MAGNESIUM    Collection Time: 03/07/23 12:52 AM   Result Value Ref Range    Magnesium 1.8 1.5 - 2.5 mg/dL   PHOSPHORUS    Collection Time: 03/07/23 12:52 AM   Result Value Ref Range    Phosphorus 2.7 2.5 - 4.5 mg/dL   POCT glucose device results    Collection Time: 03/07/23  4:21 AM   Result Value Ref Range    POC Glucose, Blood 77 65 - 99 mg/dL         ASSESSMENT:  Patient is a 47 y.o. male admitted with left foot osteomyelitis now on IV antibiotics, pending surgery    [facility] Code / Diagnosis to Support: 0017.8 - Medically Complex: Medical/Surgical Complications    Rehabilitation: Impaired ADLs and mobility  Patient will need IPR when all surgeries have been completed, and patient is medically cleared and able to tolerate IPR.  Patient has been to [facility] twice in the past, had a positive experience both times, and is wanting to return to continue rehab following this admission    All cases are subject to administrative review and recommendations may change    Disposition recommendations:  -Good candidate for IPR.  Patient is debilitated, and is expected to go to surgery today for washout, however may need amputation.  -TCC to follow for rehab appropriateness and medical clearance  -PMR to follow in the periphery, please reach out with questions or request for medical management    Medical Complexity:    Functional debility  -Patient is far below his baseline, which she is typically able to ambulate with four-wheel walker  -Continue PT OT while in-house  -Patient is expected to qualify for IPR when medically cleared    Left foot osteomyelitis  -WBC trending up, MR foot showing osteomyelitis and tenosynovitis  -Pending surgical intervention today with Ortho.   -Currently being followed by ID, on Ancef TID through 3/12/2023.   -Patient's persistent fevers are concerning for underlying purulence, awaiting surgical exploration    Tobacco abuse  - Tobacco abuse cessation counseling given today for ~5 min  as it pertains to vascular disease, heart attack, stroke, wound healing, and pulmonary disease.   -Nicotine patch 7 mg daily    Hyponatremia  -Sodium 128  -Primary team monitoring with serial labs and replacing as needed    DVT PPX: SCDs      Thank you for allowing us to participate in the care of this patient.     Patient was seen for 84 minutes on unit/floor of which > 50% of time was spent on counseling and coordination of care regarding the above, including prognosis, risk reduction, benefits of treatment, and options for next stage of care.    Jed Pascual, DO   Physical Medicine and Rehabilitation     Please note that this dictation was created using voice recognition software. I have made every reasonable attempt to correct obvious errors, but there may be errors of grammar and possibly content that I did not discover before finalizing the note.

## 2023-03-07 NOTE — ASSESSMENT & PLAN NOTE
MRI showed tenosynovitis of the tibialis posterior tendon, flexor digitorum longus and hallucis,     S/p left BKA 3/9,  Knee immobilizer to prevent flexion contracture    I discussed with ID, continue antibiotics to complete a 7 days course, okay to transition Ancef to Augmentin 875 mg twice daily    3/12 S/p left BKA 3/9, NWB LLE  Needs dressing change every other day, was changed yesterday, required IV Dilaudid  Sutures removal in 14 to 21 days postoperation

## 2023-03-07 NOTE — PROGRESS NOTES
Lab called with critical result of Calcium 6.8 at 0211. Critical lab result read back to .   MAGNUS Barajas notified of critical lab result at 0214.  Critical lab result read back by MAGNUS Barajas.

## 2023-03-07 NOTE — PROGRESS NOTES
Handoff report received from night shift nurse. Pt care assumed. Pt is currently resting in bed. POC discussed with Pt and Pt verbalizes no questions at this time. Pt is AAOx4, on Ra, on Tele monitoring, and VSS. Call light and belongings within reach, bed in lowest and locked position, and Pt educated on use of call light.

## 2023-03-07 NOTE — PROGRESS NOTES
Infectious Disease Progress Note    Author: Romie Galdamez M.D. Date & Time of service: 3/7/2023  10:26 AM    Chief Complaint:  Follow-up for osteomyelitis    Interval History:  3/5 had another fever spike to 101.1 today, white count slightly up to 11.2, tolerating antimicrobial.  Fast asleep this morning  3 Tmax 99.1, no CBC this morning, Patient notes feeling much worse overall, persistent pain and tenderness over posterior distal leg.  MRI findings as below  3/7 Tmax 100, white count is up to 20.8 today, surgery is planning intervention today.  Patient continues to feel bad with significant posterior distal left leg pain    Labs Reviewed and Medications Reviewed.    Review of Systems:  Review of Systems   Constitutional:  Negative for chills and fever.   Gastrointestinal:  Negative for abdominal pain, diarrhea, nausea and vomiting.   Musculoskeletal:  Positive for myalgias.   Skin:  Negative for itching and rash.   All other systems reviewed and are negative.    Hemodynamics:  Temp (24hrs), Av.3 °C (99.1 °F), Min:36.7 °C (98 °F), Max:37.5 °C (99.5 °F)  Temperature: 37.4 °C (99.3 °F)  Pulse  Av.3  Min: 59  Max: 102   Blood Pressure: 111/75       Physical Exam:  Physical Exam  Vitals and nursing note reviewed.   Constitutional:       General: He is not in acute distress.     Appearance: He is not ill-appearing.   HENT:      Nose: No congestion.   Eyes:      General: No scleral icterus.        Right eye: No discharge.         Left eye: No discharge.      Conjunctiva/sclera: Conjunctivae normal.   Cardiovascular:      Rate and Rhythm: Normal rate.      Heart sounds: No murmur heard.  Pulmonary:      Effort: Pulmonary effort is normal. No respiratory distress.      Breath sounds: No stridor.   Abdominal:      General: Abdomen is flat. There is no distension.      Palpations: There is no mass.      Comments: Left-sided tinea cruris   Musculoskeletal:         General: Swelling and tenderness present.       Comments: Bilateral plantar chronic wounds, left foot swelling without significant erythema.    Left leg anterior cellulitis has significantly improved with wrinkling of skin with the exception of a persistently tender, extremely hard area posterior distal leg, significant pain with dorsiflexion of the foot    Skin:     Findings: Erythema and rash present.      Comments: In addition to above, small papular erythematous lesions, patient states the eruptions appear quite frequently during RA flares   Neurological:      General: No focal deficit present.      Mental Status: He is alert and oriented to person, place, and time.   Psychiatric:         Mood and Affect: Mood normal.         Behavior: Behavior normal.       Meds:    Current Facility-Administered Medications:     [DISCONTINUED] oxyCODONE immediate-release **OR** [DISCONTINUED] oxyCODONE immediate-release **OR** HYDROmorphone    ketoconazole    morphine    calcium citrate    thiamine    zolpidem    nicotine    D5LR    ceFAZolin    morphine ER    omeprazole    senna-docusate **AND** polyethylene glycol/lytes **AND** magnesium hydroxide **AND** bisacodyl    LR    acetaminophen    Notify provider if pain remains uncontrolled **AND** Use the Numeric Rating Scale (NRS), Sy-Baker Faces (WBF), or FLACC on regular floors and Critical-Care Pain Observation Tool (CPOT) on ICUs/Trauma to assess pain **AND** Pulse Ox **AND** Pharmacy Consult Request **AND** If patient difficult to arouse and/or has respiratory depression (respiratory rate of 10 or less), stop any opiates that are currently infusing and call a Rapid Response.    Notify MD and PharmD if FSBG level is less than or equal to 70 mg/dL or patient is showing signs/symptoms of hypoglycemia (tachycardia, palpitations, diaphoresis, clammy, tremulousness, nausea, confused) **AND** Administer 20 grams of glucose (approximately 8 ounces of fruit juice) every 15 minutes PRN FSBS less than 70 mg/dL **AND** dextrose  bolus    Labs:  Recent Labs     03/05/23 0013 03/07/23  0052   WBC 11.2* 20.8*   RBC 3.89* 3.58*   HEMOGLOBIN 11.2* 10.5*   HEMATOCRIT 33.4* 30.8*   MCV 85.9 86.0   MCH 28.8 29.3   RDW 47.9 47.2   PLATELETCT 94* 152*   MPV 10.2 10.4   NEUTSPOLYS 76.70* 87.00*   LYMPHOCYTES 12.90* 4.30*   MONOCYTES 7.80 6.90   EOSINOPHILS 0.00 0.90   BASOPHILS 0.00 0.00   RBCMORPHOLO Present Present       Recent Labs     03/05/23 0013 03/07/23 0052   SODIUM 135 128*   POTASSIUM 3.4* 4.3   CHLORIDE 101 98   CO2 25 21   GLUCOSE 90 92   BUN 5* 8       Recent Labs     03/05/23 0013 03/07/23 0052   ALBUMIN 1.9*  --    TBILIRUBIN 0.4  --    ALKPHOSPHAT 182*  --    TOTPROTEIN 5.1*  --    ALTSGPT 11  --    ASTSGOT 17  --    CREATININE 0.52 0.45*         Imaging:  CT-EXTREMITY, LOWER WITH LEFT    Result Date: 2/28/2023 2/28/2023 4:10 PM HISTORY/REASON FOR EXAM:  below the knee down, concern for infectious process. TECHNIQUE/EXAM DESCRIPTION AND NUMBER OF VIEWS:  CT scan of the LEFT lower extremity with contrast, with reconstructions. Thin helical 3 mm sections were obtained from the distal femur through the proximal tibia/fibula. Sagittal and coronal multiplanar reconstructions were generated from the axial images. A total of 100 mL of Omnipaque 350 nonionic contrast was administered  IV without complication. Up to date radiation dose reduction adjustments have been utilized to meet ALARA standards for radiation dose reduction. COMPARISON: None. FINDINGS: There is normal bony mineralization of the left tibia and fibula. There is no evidence for acute or chronic osteomyelitis. Additionally there is evidence of no deep abscess formation in the left lower leg or foot.     1.  No evidence of acute osteomyelitis of the left lower extremity. 2.  No evidence of deep abscess formation in the subcutaneous tissues.    CT-HEAD W/O    Result Date: 2/28/2023 2/28/2023 12:13 PM HISTORY/REASON FOR EXAM:  Mental status change, unknown cause.  TECHNIQUE/EXAM DESCRIPTION AND NUMBER OF VIEWS: CT of the head without contrast. The study was performed on a helical multidetector CT scanner. Contiguous axial sections were obtained from the skull base through the vertex. Up to date radiation dose reduction adjustments have been utilized to meet ALARA standards for radiation dose reduction. COMPARISON:  Head CT 6/28/2010 FINDINGS: The calvariae and skull base are unremarkable. There are no extraaxial fluid collections. The ventricular system and basal cisterns are within normal limits. There are no areas of abnormal density in the brain substance. There are no hemorrhagic lesions.  There are no mass effects or shift of midline structures. The brainstem and posterior fossa structures are unremarkable. Paranasal sinuses in the field of view are unremarkable. Mastoids in the field of view are unremarkable.     Head CT without contrast within normal limits. No evidence of acute cerebral infarction, hemorrhage or mass lesion.     DX-CHEST-PORTABLE (1 VIEW)    Result Date: 2/28/2023 2/28/2023 11:11 AM HISTORY/REASON FOR EXAM:  possible sepsis.. TECHNIQUE/EXAM DESCRIPTION AND NUMBER OF VIEWS: Single portable view of the chest. COMPARISON: Chest x-ray 3/12/2021 FINDINGS: Heart size is within normal limits. No pulmonary infiltrates or consolidations are noted. No pleural abnormalities are noted.     No acute cardiac or pulmonary abnormalities are identified.    DX-FOOT-2- LEFT    Result Date: 2/28/2023 2/28/2023 1:44 PM HISTORY/REASON FOR EXAM:  Atraumatic Pain/Swelling/Deformity TECHNIQUE/EXAM DESCRIPTION AND NUMBER OF VIEWS: 2 views of the LEFT foot. COMPARISON:  MRI of the ankle 1/28/2023. Foot radiographs 1/21/2023 FINDINGS: Osteopenia is again present, decreasing sensitivity of the study for detection of acute displaced fracture. There is flat foot deformity as seen previously. No acute displaced fracture is noted. Mild solid periosteal reaction seen along the  second third and fourth metatarsal shafts is new on the second and third. There is no subluxation. There is probable prominent plaque-like ulceration of the heel.     Severe osteopenia and flat foot deformity without evidence of acute displaced fracture No definite radiographic evidence of osteomyelitis.    DX-TIBIA AND FIBULA LEFT    Result Date: 2023 2:25 PM HISTORY/REASON FOR EXAM:  Atraumatic Pain/Swelling/Deformity. TECHNIQUE/EXAM DESCRIPTION AND NUMBER OF VIEWS:  2 views of the LEFT tibia and fibula. COMPARISON: MRI ankle 2023 FINDINGS: There is generalized osteopenia. No acute or aggressive osseous abnormality. No dislocation. There is diffuse soft tissue swelling of the leg, edema and/or cellulitis.     1.  Diffuse soft tissue swelling, edema and/or cellulitis. 2.  No acute osseous abnormality. 3.  Osteopenia.    US-EXTREMITY VENOUS LOWER UNILAT LEFT    Result Date: 2023   Vascular Laboratory  CONCLUSIONS  No deep venous thrombosis in the common femoral, profunda femoral, femoral,  popliteal, and posterior tibial veins.  The peroneal veins could not be adequately evaluated to exclude thrombosis.  LESLIE BADILLO  Exam Date:     2023 14:21  Room #:     Inpatient  Priority:     Stat  Ht (in):             Wt (lb):  Ordering Physician:        PUNEET KEARNEY  Referring Physician:       CHRISTEL Rich  Sonographer:               Delfina Gregory RVT  Study Type:                Complete Unilateral  Technical Quality:         Adequate  Age:    47    Gender:     M  MRN:    [id number]  :    1975      BSA:  Indications:     Localized swelling, mass and lump, left lower limb  CPT Codes:       15348  ICD Codes:       R22.42  History:         Swelling/edema and redness of the left calf with ulcer of                   left foot.  Limitations:  PROCEDURES:  Left lower extremity venous duplex imaging.  The following venous structures were evaluated: common femoral, deep  femoral, proximal  "great saphenous, femoral, popliteal, peroneal, and  posterior tibial veins.  Serial compression, color, and spectral Doppler flow evaluations were  performed.  FINDINGS:  Left lower extremity.  The posterior tibial veins are difficult to assess for compressibility, but  flow response to augmentation is demonstrated.  The peroneal veins are difficult to assess for compressibility and flow  response to augmentation.  All other veins demonstrate complete color filling and compressibility with  normal venous flow dynamics including spontaneous flow and respiratory  phasicity.  No deep venous thrombosis in the common femoral, profunda femoral, femoral,  popliteal, and posterior tibial veins.  Cannot rule out deep venous thrombosis in the peroneal veins.  Flow was evaluated in the contralateral common femoral vein and normal  venous flow dynamics including spontaneous flow and respiratory phasic  variation were demonstrated.    Nas Beasley  (Electronically Signed)  Final Date:      28 February 2023                   15:13      Micro:  Results       Procedure Component Value Units Date/Time    Blood Culture [[id number]] Collected: 02/28/23 2011    Order Status: Completed Specimen: Blood from Peripheral Updated: 03/05/23 2300     Significant Indicator NEG     Source BLD     Site PERIPHERAL     Culture Result No growth after 5 days of incubation.    Narrative:      From different peripheral sites, if not done within the last  24 hours (Per Hospital Policy: Only change specimen source to  \"Line\" if specified by physician order)  Left Forearm/Arm    Blood Culture [[id number]] Collected: 02/28/23 2146    Order Status: Completed Specimen: Blood from Peripheral Updated: 03/05/23 2300     Significant Indicator NEG     Source BLD     Site PERIPHERAL     Culture Result No growth after 5 days of incubation.    Narrative:      From different peripheral sites, if not done within the last  24 hours (Per Hospital Policy: Only change " "specimen source to  \"Line\" if specified by physician order)  Right Forearm/Arm    Urine Culture (New) [[id number]] Collected: 03/01/23 0556    Order Status: Completed Specimen: Urine Updated: 03/03/23 0622     Significant Indicator NEG     Source UR     Site -     Culture Result No growth at 48 hours.    Narrative:      Indication for culture:->Evaluation for sepsis without a  clear source of infection  Indication for culture:->Evaluation for sepsis without a    Blood Culture [[id number]]  (Abnormal) Collected: 02/28/23 1111    Order Status: Completed Specimen: Blood from Peripheral Updated: 03/02/23 1631     Significant Indicator POS     Source BLD     Site PERIPHERAL     Culture Result Growth detected by Bactec instrument. 02/28/2023  20:18      Beta Hemolytic Streptococcus group A  See previous culture for sensitivity report.      Narrative:      CALL  [facility] tel. [phone],  [facility] [phone] 02/28/2023, 20:20, RB PERF. RESULTS CALLED TO:  RN [phone]  Per Hospital Policy: Only change Specimen Src: to \"Line\" if  specified by physician order.  Left Hand    Blood Culture [[id number]]  (Abnormal)  (Susceptibility) Collected: 02/28/23 1110    Order Status: Completed Specimen: Blood from Peripheral Updated: 03/02/23 1631     Significant Indicator POS     Source BLD     Site PERIPHERAL     Culture Result Growth detected by Bactec instrument. 02/28/2023  19:24      Beta Hemolytic Streptococcus group A  This isolate does NOT demonstrate inducible Clindamycin  resistance in vitro.      Narrative:      CALL  [facility] tel. [phone],  CALLED  [facility] tel. [phone] 03/01/2023, 10:33, RB PERF. RESULTS CALLED  TO:Nani Goldsmith RN [GRPA Strep]  Per Hospital Policy: Only change Specimen Src: to \"Line\" if  specified by physician order.  Right AC    Susceptibility       Beta hemolytic streptococcus group a (1)       Antibiotic Interpretation Microscan   Method Status    Cefotaxime Sensitive 0.064 mcg/mL E-TEST " "Final    Penicillin Sensitive 0.047 mcg/mL E-TEST Final    Clindamycin Sensitive - mcg/mL E-TEST Final                       BLOOD CULTURE [[id number]]     Order Status: Canceled Specimen: Blood from Peripheral     BLOOD CULTURE [[id number]]     Order Status: Canceled Specimen: Blood from Peripheral     E-Test [[id number]] Collected: 02/28/23 1110    Order Status: Completed Specimen: Other Updated: 03/01/23 1033     ETEST Sensitivity INTERIM    Narrative:      [facility] tel. [phone] 03/01/2023, 10:33, RB PERF. RESULTS CALLED TO:Nani Goldsmith RN [GRPA Strep]  Per Hospital Policy: Only change Specimen Src: to \"Line\" if  specified by physician order.    Urinalysis [[id number]]  (Abnormal) Collected: 03/01/23 0556    Order Status: Completed Specimen: Urine Updated: 03/01/23 0640     Color Yellow     Character Clear     Specific Gravity 1.013     Ph 6.0     Glucose Negative mg/dL      Ketones Negative mg/dL      Protein Negative mg/dL      Bilirubin Negative     Urobilinogen, Urine 1.0     Nitrite Negative     Leukocyte Esterase Negative     Occult Blood Small     Micro Urine Req Microscopic    Narrative:      Indication for culture:->Evaluation for sepsis without a  clear source of infection    MRSA By PCR (Amp) [[id number]]     Order Status: Canceled Specimen: Respirate from Nares     CoV-2, FLU A/B, and RSV by PCR (2-4 Hours CEPHEID) : Collect NP swab in VTM [[id number]] Collected: 02/28/23 1416    Order Status: Completed Specimen: Respirate Updated: 02/28/23 1602     Influenza virus A RNA Negative     Influenza virus B, PCR Negative     RSV, PCR Negative     SARS-CoV-2 by PCR NotDetected     Comment: [facility] providers: PLEASE REFER TO DE-ESCALATION AND RETESTING PROTOCOL  on [email or web address]    **The [employer] [facility] GeneXpert Xpress SARS-CoV-2 RT-PCR Test has been made  available for use under the Emergency Use Authorization (EUA) only.          SARS-CoV-2 Source NP Swab            Assessment:  Richard Hubbard II is a 47 " y.o. male patient with rheumatoid arthritis with rheumatoid nodules on Humira, history of planovalgus deformities and chronic bilateral plantar foot ulcers since early 2022.  Patient was [facility], plan was for outpatient MRI but this was delayed, eventually obtained on 1/28/2023, found to have marrow edema involving the navicular,  cuboid, first through third cuneiforms, 1st-3rd proximal metatarsals, concerning for osteomyelitis.  He was advised reconstructive surgeries but this has been delayed due to social issues.  About a week ago, patient began to experience worsening redness and swelling of the left lower extremity.  He presented to the ER.  Tmax is 101, no leukocytosis.  Blood cultures x2 from 2/28 positive for group A Strep.  Blood cultures from later that same day negative thus far.  CT scan with no obvious abscess. Orthopedics outpatient, no plans for surgery while inpatient.     Pertinent Diagnoses:  Elevated temperatures, persistent  Group A Strep bacteremia  Left leg cellulitis, improved  Left foot chronic osteomyelitis  Left foot tenosynovitis  Left-sided tinea cruris  Rheumatoid arthritis  Chronic foot deformities, chronic bilateral foot ulcers  Immunosuppressed status    Plan:  -Continue IV Ancef 2 g every 8 hours  -Repeat blood cultures x2 from 2/28 negative  -Fever spikes continue despite improvement of the anterior left leg cellulitis, concerning for underlying purulence that may need surgical intervention sooner than previously planned.  There is persistent swelling, erythema, increased temperature, tenderness especially with foot dorsiflexion, of the posterior distal left leg.  Repeat MRI scan with contrast revealing cellulitis, myositis, some fluid in the foot, prominent tenosynovitis of the tibialis posterior tendon, flexor digitorum longus and hallucis, consistent with the physical exam findings. This is new compared to previous MRI findings and can explain the persistence  of sepsis.  White count is also trending up today.  Recommend orthopedics reevaluation for consideration of surgical intervention -anticipated today     Disposition: Anticipate no ID specific disposition needs  Need for PICC line: No     Plan was discussed with the primary team, Dr. Dias

## 2023-03-07 NOTE — DISCHARGE PLANNING
[facility]    Referral from: MARY CARMEN Skinner    Insurance Provider on Facesheet: [facility]    Potential Rehab Diagnosis: [facility]    Chart review indicates patient may have on going medical management and may have therapy needs to possibly meet inpatient rehab facility criteria with the goal of returning to community.    D/C support will need to be verified: Spouse    Physiatry consultation forwarded per protocol.      Thank you for the referral.

## 2023-03-08 PROBLEM — Z79.899 IMMUNOCOMPROMISED STATE DUE TO DRUG THERAPY (HCC): Status: ACTIVE | Noted: 2023-01-01

## 2023-03-08 PROBLEM — R78.81 BACTEREMIA: Status: ACTIVE | Noted: 2023-01-01

## 2023-03-08 PROBLEM — D84.821 IMMUNOCOMPROMISED STATE DUE TO DRUG THERAPY (HCC): Status: ACTIVE | Noted: 2023-01-01

## 2023-03-08 NOTE — PROGRESS NOTES
4 Eyes Skin Assessment Completed by FAITH Reynolds and FAITH Spencer.    Head WDL  Ears WDL  Nose WDL  Mouth Scab to lip  Neck WDL  Breast/Chest WDL  Shoulder Blades Redness  Spine Redness-rash  (R) Arm/Elbow/Hand Redness, Blanching, Bruising, and Scab  (L) Arm/Elbow/Hand Redness, Blanching, and Scab  Abdomen Redness, Hernia   Groin Redness and Excoriation  Scrotum/Coccyx/Buttocks Redness, Blanching, Excoriation, and Moisture Fissure  (R) Leg Redness, Blanching, and Scar, Discoloration  (L) Leg Redness and Blanching, Discoloartion  (R) Heel/Foot/Toe Redness and Blanching, RA Deformity   (L) Heel/Foot/Toe Redness and Blanching, RA Deformity           Devices In Places Blood Pressure Cuff, Pulse Ox, and SCD's      Interventions In Place Pillows, Barrier Cream, Dri-Odell Pads, Heels Loaded W/Pillows, and Pressure Redistribution Mattress    Possible Skin Injury Yes    Pictures Uploaded Into Epic No, needs to be completed  Wound Consult Placed Yes  RN Wound Prevention Protocol Ordered Yes

## 2023-03-08 NOTE — ASSESSMENT & PLAN NOTE
Group A strep bacteremia  Repeated culture 2/28 negative  During the stay in the hospital, I will continue to follow the patient, patient has completed IV antibiotics on 3/15/2023

## 2023-03-08 NOTE — PROGRESS NOTES
Report received and assumed patient care. Pt A&O x 4 and on room air. No signs of distress noted at this time. Pt has medical orders. Pt updated on plan of care for the day and has call light within reach. Fall precautions are in place.

## 2023-03-08 NOTE — THERAPY
Occupational Therapy  Daily Treatment     Patient Name: Richard Hubbard II  Age:  47 y.o., Sex:  male  Medical Record #: [id number]  Today's Date: 3/8/2023     Precautions: Fall Risk, Weight Bearing As Tolerated Right Lower Extremity, Weight Bearing As Tolerated Left Lower Extremity    Assessment    Pt agreeable to OT tx session but reporting feeling very ill (feverish) and weak since previous session. Pt stated he has been off his RA medication since admit and his inflammation is impacting ROM/pain in all of his joints. Today pt demonstrated gentle UE ROM in supine (with pain in L shoulder and R wrist), mod/max A for mobility to EOB, and max A for LB dressing. Will continue to follow for skilled OT services and evaluate needs post op.    Plan    Treatment Plan Status: (P) Continue Current Treatment Plan  Type of Treatment: Self Care / Activities of Daily Living, Neuro Re-Education / Balance, Therapeutic Exercises, Therapeutic Activity, Adaptive Equipment  Treatment Frequency: 3 Times per Week  Treatment Duration: Until Therapy Goals Met    DC Equipment Recommendations: (P) Unable to determine at this time  Discharge Recommendations: (P) Recommend post-acute placement for additional occupational therapy services prior to discharge home       03/08/23 1205   Cognition    Comments very pleasant and agreeable, applogizing throughout for confusion 2/2 fever   Supine Upper Body Exercises   Supine Upper Body Exercises Yes   Front Arm Raise 1 set of 10   Bicep Curl 1 set of 10   Comments poor RUE wrist ROM and poor LUE shoulder ROM   Other Treatments   Other Treatments Provided pt reporting significantly increased weakness/RA inflamation since off RA medication impacting mobility/ROM   Balance   Sitting Balance (Static) Fair   Sitting Balance (Dynamic) Fair -   Standing Balance (Static) Trace +   Weight Shift Sitting Poor   Weight Shift Standing Poor   Skilled Intervention Verbal Cuing   Comments with fWW   Bed Mobility     Supine to Sit Moderate Assist   Sit to Supine Maximal Assist   Scooting Maximal Assist   Skilled Intervention Verbal Cuing   Activities of Daily Living   Lower Body Dressing Maximal Assist  (donning socks and offloading shoes, required extended time 2/2 pain)   Skilled Intervention Verbal Cuing;Tactile Cuing   How much help from another person does the patient currently need...   6 Clicks Daily Activity Score 14   Functional Mobility   Sit to Stand Maximal Assist  (2pa)   Patient / Family Goals   Patient / Family Goal #1 To get better   Short Term Goals   Short Term Goal # 1 Pt will demo LB dressing w/ SPV   Goal Outcome # 1 Progressing slower than expected   Short Term Goal # 2 Pt will demo standing grooming w/ SPV   Goal Outcome # 2 Progressing slower than expected   Short Term Goal # 3 Pt will demo toilet txf w/ SPV   Goal Outcome # 3 Progressing slower than expected   Education Group   Role of Occupational Therapist Patient Response Patient;Acceptance;Explanation;Demonstration;Verbal Demonstration;Action Demonstration   Occupational Therapy Treatment Plan    O.T. Treatment Plan Continue Current Treatment Plan   Anticipated Discharge Equipment and Recommendations   DC Equipment Recommendations Unable to determine at this time   Discharge Recommendations Recommend post-acute placement for additional occupational therapy services prior to discharge home

## 2023-03-08 NOTE — CARE PLAN
The patient is Stable - Low risk of patient condition declining or worsening    Shift Goals  Clinical Goals: Pain control, Skin integrity  Patient Goals: Pain control  Family Goals: Not present    Progress made toward(s) clinical / shift goals:    Problem: Pain - Standard  Goal: Alleviation of pain or a reduction in pain to the patient’s comfort goal  Outcome: Progressing  Note: Patient educated on pain scale and to notify RN of pain. Medicated per MAR and repositioned        Problem: Fall Risk  Goal: Patient will remain free from falls  Outcome: Progressing     Problem: Knowledge Deficit - Standard  Goal: Patient and family/care givers will demonstrate understanding of plan of care, disease process/condition, diagnostic tests and medications  Outcome: Progressing  Note: Plan of care discussed, verbalized understanding. Questions answered     Problem: Self Care  Goal: Patient will have the ability to perform ADLs independently or with assistance (bathe, groom, dress, toilet and feed)  Outcome: Progressing       Patient is not progressing towards the following goals:      Problem: Skin Integrity  Goal: Skin integrity is maintained or improved  Outcome: Not Progressing  Note: Wound consult placed     Problem: Mobility  Goal: Patient's capacity to carry out activities will improve  Outcome: Not Progressing

## 2023-03-08 NOTE — WOUND TEAM
[facility]  Inpatient Services  Initial Wound and Skin Care Evaluation    Admission Date: 2/28/2023     Last order of IP CONSULT TO WOUND CARE was found on 3/8/2023 from Hospital Encounter on 2/28/2023     HPI, PMH, SH: Reviewed    Past Surgical History:   Procedure Laterality Date    PB TOTAL KNEE ARTHROPLASTY Right 7/22/2020    Procedure: ARTHROPLASTY, KNEE, TOTAL;  Surgeon: Temo Pires M.D.;  Location: SURGERY [facility];  Service: Orthopedics    TENDON TRANSFER Right 1/22/2020    Procedure: RIGHT DISTAL ULNA RESECTION AND EXTENSOR TENDON TRANSFER;  Surgeon: Marine Rushing M.D.;  Location: SURGERY [facility];  Service: Orthopedics    OTHER ORTHOPEDIC SURGERY Right 2020    total right knee replacement    IRRIGATION & DEBRIDEMENT ORTHO Left 10/9/2019    Procedure: IRRIGATION AND DEBRIDEMENT, WOUND - PELVIC OSTEOMYELITIS;  Surgeon: You Olivares M.D.;  Location: SURGERY [facility];  Service: Orthopedics    OTHER SURGICAL PROCEDURE  2019    osteomelitis of pelvis cleaned    GASTRIC BYPASS LAPAROSCOPIC  2000    Lucila en y    CHOLECYSTECTOMY  2000    OTHER SURGICAL PROCEDURE      repair of broken penis     Social History     Tobacco Use    Smoking status: Every Day     Packs/day: 0.25     Types: Cigarettes, Cigars    Smokeless tobacco: Never    Tobacco comments:     occasional cigar   Substance Use Topics    Alcohol use: No     Alcohol/week: 0.0 oz     Comment: quit 2011 ago- past ETOH abuse     Chief Complaint   Patient presents with    Flu Like Symptoms     X 3 days, patient reports malaise, fever, and diarrhea     Low Blood Sugar     40 per EMS. Patient given 250 cc D10. FSBS up to 111    Leg Swelling     Redness and warmth to the LLE. Chronic wounds to bilateral feet      Diagnosis: Sepsis (HCC) [A41.9]    Unit where seen by Wound Team: T315/02     WOUND CONSULT/FOLLOW UP RELATED TO:  Groin and sacrum    WOUND HISTORY:  Patient with MASD.    WOUND ASSESSMENT/LDA        Moisture Associated Skin Damage 03/08/23 Buttock;Groin;Sacrum (Active)   Wound Image     03/08/23 1045   NEXT Weekly Photo (Inpatient Only) 03/15/23 03/08/23 1045   Drainage Amount Scant 03/08/23 1045   Drainage Description Serosanguineous 03/08/23 1045   Periwound Assessment Blanchable erythema;Rash;Satellite lesions;Fragile 03/08/23 1045   IAD Cleansing Foam Cleanser/Washcloth 03/08/23 1045   Periwound Protectant Antifungal Therapy 03/08/23 1045   IAD Containment Device None 03/08/23 1045   WOUND NURSE ONLY - Time Spent with Patient (mins) 30 03/08/23 1045      Vascular:    SUSHMA:   No results found.    Lab Values:    Lab Results   Component Value Date/Time    WBC 17.6 (H) 03/08/2023 05:28 AM    WBC 8.4 10/19/2010 12:05 PM    RBC 3.65 (L) 03/08/2023 05:28 AM    RBC 4.96 10/19/2010 12:05 PM    HEMOGLOBIN 10.5 (L) 03/08/2023 05:28 AM    HEMATOCRIT 31.3 (L) 03/08/2023 05:28 AM    CREACTPROT 18.81 (H) 02/28/2023 10:57 AM    SEDRATEWES 20 02/28/2023 10:57 AM    HBA1C 4.8 10/18/2019 05:18 AM        Culture Results show:  No results found for this or any previous visit (from the past 720 hour(s)).    Pain Level/Medicated:  C/o of mild pain during cleansing    INTERVENTIONS BY WOUND TEAM:  Chart and images reviewed. Discussed with bedside RN. All areas of concern (based on picture review, LDA review and discussion with bedside RN) have been thoroughly assessed. Documentation of areas based on significant findings. This RN in to assess patient. Performed standard wound care which includes appropriate positioning, dressing removal and non-selective debridement. Pictures and measurements obtained weekly if/when required.  Buttocks/Sacrum  Preparation for Dressing removal: NA and Open to air  Non-selectively Debrided with:  Moist warm washcloth and No rinse foam soap   Sharp debridement: NA  [place] wound: Cleansed with Moist warm washcloth and No rinse foam soap, Prepped with Miconazole GERARD  Primary Dressing: Open to  Air  Secondary (Outer) Dressing: Open to Air    Groin - Ketoconazole cream applied    Interdisciplinary consultation: Patient, Bedside RN,    EVALUATION / RATIONALE FOR TREATMENT:  Most Recent Date:  3/8/23: Patient groin and buttocks with blanchable erythema and yeast-like rash. Antifungal therapy already in use to groin. Miconazole miguel ordered for buttocks and sacral area.     Goals: Steady decrease in wound area and depth weekly.    WOUND TEAM PLAN OF CARE ([X] for frequency of wound follow up,):   Nursing to follow dressing orders written for wound care. Contact wound team if area fails to progress, deteriorates or with any questions/concerns if something comes up before next scheduled follow up (See below as to whether wound is following and frequency of wound follow up)  Dressing changes by wound team:                   Follow up 3 times weekly:                NPWT change 3 times weekly:     Follow up 1-2 times weekly:      Follow up Bi-Monthly:           Follow up Monthly (High Risk):                        Follow up as needed:     Other (explain):     NURSING PLAN OF CARE ORDERS (X):  Dressing changes: See Dressing Care orders:   Skin care: See Skin Care orders: X  RN Prevention Protocol: X  Rectal tube care: See Rectal Tube Care orders:   Other orders:    RSKIN:   CURRENTLY IN PLACE (X), APPLIED THIS VISIT (A), ORDERED (O):   Q shift Nam:  X  Q shift pressure point assessments:  X    Surface/Positioning   Standard Mattress/Trauma Bed           Low Airloss          ICU Low Airloss X  Bariatric STEPHANIE     Waffle cushion        Waffle Overlay          Reposition q 2 hours      TAPs Turning system     Z Odell Pillow     Offloading/Redistribution   Sacral Offloading Dressing (Silicone dressing)     Heel Offloading Dressing (Silicone dressing)         Heel float boots (Prevalon boot)             Float Heels off Bed with Pillows       X    Respiratory NA RA  Silicone O2 tubing         Gray Foam Ear protectors      Cannula fixation Device (Tender )          High flow offloading Clip    Elastic head band offloading device      Anchorfast                                                         Trach with Optifoam split foam             Containment/Moisture Prevention   Rectal tube or BMS    Purwick/Condom Cath        Rivera Catheter    Barrier wipes           Barrier paste   A    Antifungal tx    A  Interdry        Mobilization not assessed    Up to chair        Ambulate      PT/OT      Nutrition   Dietician        Diabetes Education      PO X    TF     TPN     NPO   # days     Other        Anticipated discharge plans: TBD  LTACH:        SNF/Rehab:                  Home Health Care:           Outpatient Wound Center:            Self/Family Care:        Other:                  Vac Discharge Needs:   Vac Discharge plan is purely a recommendation from wound team and not a requirement for discharge unless otherwise stated by physician.  Not Applicable Pt not on a wound vac:     X  Regular Vac while inpatient, alternative dressing at DC:        Regular Vac in use and continued at DC:            Reg. Vac w/ Skin Sub/Biologic in use. Will need to be changed 2x wkly:      Veraflo Vac while inpatient, ok to transition to Regular Vac on Discharge (Bedside RN to Clamp small instillation tubing at time of DC):           Veraflo Vac while inpatient, would benefit from remaining on Veraflo Vac upon discharge:

## 2023-03-08 NOTE — DISCHARGE PLANNING
Case Management Discharge Planning    Admission Date: 2/28/2023  GMLOS: 5  ALOS: 8    6-Clicks ADL Score: 14  6-Clicks Mobility Score: 7  PT and/or OT Eval ordered: Yes  Post-acute Referrals Ordered: Yes  Post-acute Choice Obtained: Yes  Has referral(s) been sent to post-acute provider:  Yes      Anticipated Discharge Dispo: Discharge Disposition: Disch to  rehab facility or distinct part unit (62)    DME Needed: No    Action(s) Taken: MDR discussion, chart review. Plan for OR tomorrow for Left BKA. No stable for discharge.     Escalations Completed: None    Medically Clear: No    Next Steps: OR on 3/9, PT/OT recs.     Barriers to Discharge: Medical clearance and Pending Procedures    Is the patient up for discharge tomorrow: No

## 2023-03-08 NOTE — CARE PLAN
The patient is Watcher - Medium risk of patient condition declining or worsening    Shift Goals  Clinical Goals: Pain management  Patient Goals: Pain management, rest  Family Goals: JI    Progress made toward(s) clinical / shift goals:      Problem: Hemodynamics  Goal: Patient's hemodynamics, fluid balance and neurologic status will be stable or improve  Outcome: Progressing     Problem: Fluid Volume  Goal: Fluid volume balance will be maintained  Outcome: Progressing     Problem: Urinary - Renal Perfusion  Goal: Ability to achieve and maintain adequate renal perfusion and functioning will improve  Outcome: Progressing     Problem: Respiratory  Goal: Patient will achieve/maintain optimum respiratory ventilation and gas exchange  Outcome: Progressing     Problem: Physical Regulation  Goal: Signs and symptoms of infection will decrease  Outcome: Progressing     Problem: Skin Integrity  Goal: Skin integrity is maintained or improved  Outcome: Progressing     Problem: Fall Risk  Goal: Patient will remain free from falls  Outcome: Progressing     Problem: Knowledge Deficit - Standard  Goal: Patient and family/care givers will demonstrate understanding of plan of care, disease process/condition, diagnostic tests and medications  Outcome: Progressing     Problem: Self Care  Goal: Patient will have the ability to perform ADLs independently or with assistance (bathe, groom, dress, toilet and feed)  Outcome: Progressing       Patient is not progressing towards the following goals:    Problem: Pain - Standard  Goal: Alleviation of pain or a reduction in pain to the patient’s comfort goal  Outcome: Not Progressing     Problem: Mobility  Goal: Patient's capacity to carry out activities will improve  Outcome: Not Progressing

## 2023-03-08 NOTE — PROGRESS NOTES
Hospital Medicine Daily Progress Note    Date of Service  3/8/2023    Chief Complaint  Richard Hubbard II is a 47 y.o. male admitted 2/28/2023 with cellulitis and sepsis    Hospital Course  Richard Hubbard II is a 47 y.o. male who presented 2/28/2023 with history of rheumatoid arthritis on Humira with history of planovalgus deformities and foot ulcers with recent MRI concerning for osteomyelitis of the left navicular bone presented to the emergency department for evaluation of confusion and fever of 103 associated with hypoglycemia and worsening redness of the lower extremities more so on the left lower extremity.  He has a history of chronic pain syndrome with narcotic dependence.  The patient is not diabetic and has not had any prior history of hypoglycemia.  According to the wife he has been in the past on prednisone for rheumatoid arthritis flares but has not taken any over the past month.  No headache no loss of consciousness.  Patient was noted to be hypotensive and hypoglycemic received dextrose infusion antibiotics and fluids resuscitation and admitted to IMCU.  Blood cultures grew group A strep    ID recommendations  If cellulitis improvement continues, anticipate completion of sepsis treatment with p.o. linezolid 600 mg twice daily through 3/9/2023, followed by switch to p.o. amoxicillin 1 g twice daily for suppression in order to prevent recurrent sepsis while awaiting surgical intervention of the foot.    MRI performed of the left knee through foot yesterday with new findings of tenosynovitis which may be the explanation for his persistent elevated temperatures and mild leukocytosis.      Interval Problem Update  Patient was seen and examined at bedside  Reported overall joint pain due to arthritis    Patient reported that he uses contin 30mg bid and morphine 30 mg every 6 hours  He has been following rheumatologist Dr. Conway and Pain Dr. DREW discussed with Ortho, plan for L BKA tomorrow  with Dr Reyes Carlton    I have discussed this patient's plan of care and discharge plan at IDT rounds today with Case Management, Nursing, Nursing leadership, and other members of the IDT team.    Consultants/Specialty  critical care    Code Status  Full Code    Disposition  Patient is not medically cleared for discharge.   Anticipate discharge to to home with close outpatient follow-up.  I have placed the appropriate orders for post-discharge needs.    Review of Systems  Review of Systems   Constitutional:  Positive for malaise/fatigue. Negative for fever.   Respiratory:  Negative for shortness of breath.    Cardiovascular:  Negative for chest pain, palpitations and orthopnea.   Genitourinary:  Negative for dysuria, frequency and urgency.   Musculoskeletal:  Positive for joint pain.   Skin:  Positive for rash.   All other systems reviewed and are negative.     Physical Exam  Temp:  [36.7 °C (98.1 °F)-37.2 °C (99 °F)] 37.2 °C (99 °F)  Pulse:  [85-89] 85  Resp:  [16-19] 17  BP: (102-114)/(70-77) 106/74  SpO2:  [92 %-96 %] 93 %    Physical Exam  Vitals and nursing note reviewed.   Constitutional:       Appearance: He is well-developed. He is not diaphoretic.   HENT:      Head: Normocephalic and atraumatic.      Mouth/Throat:      Pharynx: No oropharyngeal exudate.   Eyes:      General: No scleral icterus.        Right eye: No discharge.         Left eye: No discharge.      Pupils: Pupils are equal, round, and reactive to light.   Neck:      Vascular: No JVD.      Trachea: No tracheal deviation.   Cardiovascular:      Rate and Rhythm: Normal rate and regular rhythm.      Heart sounds: No murmur heard.    No friction rub. No gallop.   Pulmonary:      Effort: Pulmonary effort is normal. No respiratory distress.      Breath sounds: Normal breath sounds. No stridor. No wheezing.   Chest:      Chest wall: No tenderness.   Abdominal:      General: There is no distension.      Palpations: Abdomen is soft.       Tenderness: There is no abdominal tenderness. There is no rebound.      Hernia: A hernia (Soft and reducible) is present.   Musculoskeletal:         General: Swelling and tenderness present.      Cervical back: Neck supple.   Skin:     General: Skin is warm.      Findings: Erythema present.      Nails: There is no clubbing.      Comments: Left scrotal perineal rash    Left lower extremity erythema improved   Neurological:      Mental Status: He is alert and oriented to person, place, and time.      Cranial Nerves: No cranial nerve deficit.      Motor: No weakness or abnormal muscle tone.   Psychiatric:         Behavior: Behavior normal.       Fluids    Intake/Output Summary (Last 24 hours) at 3/8/2023 1528  Last data filed at 3/8/2023 0815  Gross per 24 hour   Intake --   Output 1875 ml   Net -1875 ml       Laboratory  Recent Labs     03/07/23 0052 03/08/23  0528   WBC 20.8* 17.6*   RBC 3.58* 3.65*   HEMOGLOBIN 10.5* 10.5*   HEMATOCRIT 30.8* 31.3*   MCV 86.0 85.8   MCH 29.3 28.8   MCHC 34.1 33.5*   RDW 47.2 46.3   PLATELETCT 152* 197   MPV 10.4 9.9     Recent Labs     03/07/23 0052 03/08/23  0528   SODIUM 128* 124*   POTASSIUM 4.3 4.6   CHLORIDE 98 96   CO2 21 22   GLUCOSE 92 84   BUN 8 9   CREATININE 0.45* 0.49*   CALCIUM 6.8* 7.0*                     Imaging  [id number] & W/O LEFT   Final Result      1.  No evidence of left tibial or fibular osteomyelitis or fracture.      2.  Diffuse cellulitis      3.  Atrophy of the muscles of the lower leg with edema and patchy enhancement consistent with myositis. This involves the tibialis posterior muscle to the greatest degree.      4.  Some fluid seen superficial to the medial head of the gastrocnemius muscle. There is no evidence of rim enhancement to suggest presence of abscess.      MR-FOOT-WITH & W/O LEFT   Final Result      1.  Again seen marrow edema involving the medial aspect of the navicular and the adjacent first cuneiform with some enhancement of  that area with overlying soft tissue ulceration and some fluid within the overlying soft tissues consistent with    osteomyelitis versus reactive change. This is unchanged from comparison.      2.  Marrow edema with small nondisplaced trabecular fracture involving the first metatarsal head with enhancement of that area. Differential diagnosis includes nondisplaced stress/insufficiency fracture versus osteomyelitis.      3.  Again seen marrow edema involving the base of the second metatarsal with some abnormal enhancement with a small possible linear nondisplaced trabecular fracture extending through that area likely representing a small nondisplaced insufficiency    fracture. There is also arthropathy of that joint.      4.  Again seen marrow edema involving the cuneiforms and the adjacent metatarsals likely representing osteoarthritis.      5.  Osteoarthritis of the tibiotalar joint with chronic osteochondral injury of the lateral aspect of the talar dome. There is also osteoarthritis of the subtalar, talonavicular and calcaneocuboid articulations.      6.  Cellulitis involving the hindfoot most prominently medially.      7.  Prominent tenosynovitis of the tibialis posterior tendon and to a lesser degree the flexor digitorum longus and flexor hallucis tendons. This underlies the area of soft tissue ulceration and could potentially represent infectious tenosynovitis. This    is new from comparison.      8.  Pes planus and valgus deformity of the hindfoot.      CT-EXTREMITY, LOWER WITH LEFT   Final Result      1.  No evidence of acute osteomyelitis of the left lower extremity.      2.  No evidence of deep abscess formation in the subcutaneous tissues.      US-EXTREMITY VENOUS LOWER UNILAT LEFT   Final Result      DX-TIBIA AND FIBULA LEFT   Final Result      1.  Diffuse soft tissue swelling, edema and/or cellulitis.   2.  No acute osseous abnormality.   3.  Osteopenia.      DX-FOOT-2- LEFT   Final Result      Severe  osteopenia and flat foot deformity without evidence of acute displaced fracture      No definite radiographic evidence of osteomyelitis.      CT-HEAD W/O   Final Result      Head CT without contrast within normal limits. No evidence of acute cerebral infarction, hemorrhage or mass lesion.         DX-CHEST-PORTABLE (1 VIEW)   Final Result      No acute cardiac or pulmonary abnormalities are identified.             Assessment/Plan  * Sepsis (HCC)- (present on admission)  Assessment & Plan  Sepsis secondary to group A strep cellulitis with bacteremia    Continue cefazolin cefazolin  CT negative for abscess  Duplex negative for DVT x-rays negative for acute bony pathology  Patient evaluated by orthopedics with nonsurgical management recommended and outpatient follow-up with Dr. Pryor  Continue wound care  Evaluated by ID              Bacteremia  Assessment & Plan  Group A strep bacteremia  Repeated culture 2/28 negative    ID following    Continue Ancef    Immunocompromised state due to drug therapy (HCC)  Assessment & Plan  Has been maintained on Humira and intermittently on prednisone     Tenosynovitis of left lower leg  Assessment & Plan  Findings on MRI infectious these recommendations appreciated.  Patient has been in reevaluated by orthopedic surgery appreciate their recommendations patient be held n.p.o. tonight for possible surgical intervention on 3/7/2023.  Present antimicrobials.    Yeast dermatitis  Assessment & Plan  Started on Diflucan and topical clotrimazole  Skin care    Electrolyte abnormality  Assessment & Plan  Hypokalemia  Hypomagnesemia  Hypophosphatemia  Hypocalcemia    Likely component of refeeding syndrome patient started on thiamine supplements    Replete with K-Phos and IV magnesium sulfate   Start calcium citrate supplements   Monitor electrolytes    Thrombocytopenia (HCC)  Assessment & Plan  Platelet counts 85K likely reactive secondary to sepsis  Monitor  CBC        Hypoglycemia  Assessment & Plan  Likely secondary to poor intake  Hypoglycemia protocol  Continue D5 LR infusion overnight and if p.o. intake remains stable will DC in a.m.  Monitor CBGs    Ulcer of both feet (HCC)- (present on admission)  Assessment & Plan  Wound care  CT negative for abscess  MRI concerning for osteomyelitis patient evaluated by LPS continue wound care  Continue Ancef  Discussed with ID Dr. Galdamez recommends transitioning to p.o. Zyvox on discharge through 3/9/2023 followed by p.o. amoxicillin 1 g twice daily for 3 to 4 weeks and outpatient follow-up with ID and orthopedics  MRI performed of the left knee through foot yesterday with new findings of tenosynovitis which may be the explanation for his persistent elevated temperatures and mild leukocytosis.  ID following and will cont on IV abx for now     3/8 I discussed with Ortho, plan for L BKA tomorrow with Dr Chambers    Chronic use of opiate drug for therapeutic purpose- (present on admission)  Assessment & Plan  With narcotic dependence    Patient reported that he uses contin 30mg bid and morphine 30 mg every 6 hours  He has been following rheumatologist Dr. Conway and Pain DrMary    Continue home dose of morphine sulfate    History of immunosuppressive therapy- (present on admission)  Assessment & Plan  Home biweekly Humira and intermittent steroids        Rheumatoid arthritis (HCC)- (present on admission)  Assessment & Plan  Has been maintained on Humira and intermittently on prednisone although no recent steroid use  Continue to hold Humira  Cortisol level 45    tapered off hydrocortisone    History of bleeding ulcers  Assessment & Plan  Continue prilosec  Patient declines pharmacologic DVT prophylaxis discussed RBA       Please note that this dictation was created using voice recognition software. I have made every reasonable attempt to correct obvious errors, but I expect that there are errors of grammar and possibly context  that I did not discover before finalizing the note.    VTE prophylaxis: SCDs/TEDsNote patient reports history of severe gastrointestinal bleed with any anticoagulant per patient request no anticoagulant for DVT prophylaxis ordere  I have performed a physical exam and reviewed and updated ROS and Plan today (3/8/2023). In review of yesterday's note (3/7/2023), there are no changes except as documented above.

## 2023-03-08 NOTE — PROGRESS NOTES
Infectious Disease Progress Note    Author: Romie Galdamez M.D. Date & Time of service: 3/8/2023  8:09 AM    Chief Complaint:  Follow-up for osteomyelitis    Interval History:  3/5 had another fever spike to 101.1 today, white count slightly up to 11.2, tolerating antimicrobial.  Fast asleep this morning  3/6 Tmax 99.1, no CBC this morning, Patient notes feeling much worse overall, persistent pain and tenderness over posterior distal leg.  MRI findings as below  3/7 Tmax 100, white count is up to 20.8 today, surgery is planning intervention today.  Patient continues to feel bad with significant posterior distal left leg pain  3/8 patient is afebrile, white count remains persistently elevated at 17.6, surgery was not performed yesterday    Labs Reviewed and Medications Reviewed.    Review of Systems:  Review of Systems   Constitutional:  Negative for chills and fever.   Gastrointestinal:  Negative for abdominal pain, diarrhea, nausea and vomiting.   Musculoskeletal:  Positive for myalgias.   Skin:  Negative for itching and rash.   All other systems reviewed and are negative.    Hemodynamics:  Temp (24hrs), Av.1 °C (98.7 °F), Min:36.7 °C (98.1 °F), Max:37.2 °C (99 °F)  Temperature: 36.7 °C (98.1 °F), Monitored Temp: 37.1 °C (98.8 °F)  Pulse  Av.3  Min: 59  Max: 102   Blood Pressure: 102/70       Physical Exam:  Physical Exam  Vitals and nursing note reviewed.   Constitutional:       General: He is not in acute distress.     Appearance: He is not ill-appearing.   HENT:      Nose: No congestion.   Eyes:      General: No scleral icterus.        Right eye: No discharge.         Left eye: No discharge.      Conjunctiva/sclera: Conjunctivae normal.   Cardiovascular:      Rate and Rhythm: Normal rate.      Heart sounds: No murmur heard.  Pulmonary:      Effort: Pulmonary effort is normal. No respiratory distress.      Breath sounds: No stridor.   Abdominal:      General: Abdomen is flat. There is no distension.       Palpations: There is no mass.      Comments: Left-sided tinea cruris   Musculoskeletal:         General: Swelling and tenderness present.      Comments: Bilateral plantar chronic wounds, left foot swelling without significant erythema.    Left leg anterior cellulitis has significantly improved with wrinkling of skin with the exception of a persistently tender, extremely hard area posterior distal leg, significant pain with dorsiflexion of the foot    Skin:     Findings: Erythema and rash present.      Comments: In addition to above, small papular erythematous lesions, patient states the eruptions appear quite frequently during RA flares   Neurological:      General: No focal deficit present.      Mental Status: He is alert and oriented to person, place, and time.   Psychiatric:         Mood and Affect: Mood normal.         Behavior: Behavior normal.       Meds:    Current Facility-Administered Medications:     [DISCONTINUED] oxyCODONE immediate-release **OR** [DISCONTINUED] oxyCODONE immediate-release **OR** HYDROmorphone    ketoconazole    morphine    calcium citrate    thiamine    zolpidem    nicotine    D5LR    ceFAZolin    morphine ER    omeprazole    senna-docusate **AND** polyethylene glycol/lytes **AND** magnesium hydroxide **AND** bisacodyl    LR    acetaminophen    Notify provider if pain remains uncontrolled **AND** Use the Numeric Rating Scale (NRS), Sy-Baker Faces (WBF), or FLACC on regular floors and Critical-Care Pain Observation Tool (CPOT) on ICUs/Trauma to assess pain **AND** Pulse Ox **AND** Pharmacy Consult Request **AND** If patient difficult to arouse and/or has respiratory depression (respiratory rate of 10 or less), stop any opiates that are currently infusing and call a Rapid Response.    Notify MD and PharmD if FSBG level is less than or equal to 70 mg/dL or patient is showing signs/symptoms of hypoglycemia (tachycardia, palpitations, diaphoresis, clammy, tremulousness, nausea, confused)  **AND** Administer 20 grams of glucose (approximately 8 ounces of fruit juice) every 15 minutes PRN FSBS less than 70 mg/dL **AND** dextrose bolus    Labs:  Recent Labs     03/07/23 0052 03/08/23 0528   WBC 20.8* 17.6*   RBC 3.58* 3.65*   HEMOGLOBIN 10.5* 10.5*   HEMATOCRIT 30.8* 31.3*   MCV 86.0 85.8   MCH 29.3 28.8   RDW 47.2 46.3   PLATELETCT 152* 197   MPV 10.4 9.9   NEUTSPOLYS 87.00*  --    LYMPHOCYTES 4.30*  --    MONOCYTES 6.90  --    EOSINOPHILS 0.90  --    BASOPHILS 0.00  --    RBCMORPHOLO Present  --        Recent Labs     03/07/23 0052 03/08/23 0528   SODIUM 128* 124*   POTASSIUM 4.3 4.6   CHLORIDE 98 96   CO2 21 22   GLUCOSE 92 84   BUN 8 9       Recent Labs     03/07/23 0052 03/08/23 0528   CREATININE 0.45* 0.49*         Imaging:  CT-EXTREMITY, LOWER WITH LEFT    Result Date: 2/28/2023 2/28/2023 4:10 PM HISTORY/REASON FOR EXAM:  below the knee down, concern for infectious process. TECHNIQUE/EXAM DESCRIPTION AND NUMBER OF VIEWS:  CT scan of the LEFT lower extremity with contrast, with reconstructions. Thin helical 3 mm sections were obtained from the distal femur through the proximal tibia/fibula. Sagittal and coronal multiplanar reconstructions were generated from the axial images. A total of 100 mL of Omnipaque 350 nonionic contrast was administered  IV without complication. Up to date radiation dose reduction adjustments have been utilized to meet ALARA standards for radiation dose reduction. COMPARISON: None. FINDINGS: There is normal bony mineralization of the left tibia and fibula. There is no evidence for acute or chronic osteomyelitis. Additionally there is evidence of no deep abscess formation in the left lower leg or foot.     1.  No evidence of acute osteomyelitis of the left lower extremity. 2.  No evidence of deep abscess formation in the subcutaneous tissues.    CT-HEAD W/O    Result Date: 2/28/2023 2/28/2023 12:13 PM HISTORY/REASON FOR EXAM:  Mental status change, unknown cause.  TECHNIQUE/EXAM DESCRIPTION AND NUMBER OF VIEWS: CT of the head without contrast. The study was performed on a helical multidetector CT scanner. Contiguous axial sections were obtained from the skull base through the vertex. Up to date radiation dose reduction adjustments have been utilized to meet ALARA standards for radiation dose reduction. COMPARISON:  Head CT 6/28/2010 FINDINGS: The calvariae and skull base are unremarkable. There are no extraaxial fluid collections. The ventricular system and basal cisterns are within normal limits. There are no areas of abnormal density in the brain substance. There are no hemorrhagic lesions.  There are no mass effects or shift of midline structures. The brainstem and posterior fossa structures are unremarkable. Paranasal sinuses in the field of view are unremarkable. Mastoids in the field of view are unremarkable.     Head CT without contrast within normal limits. No evidence of acute cerebral infarction, hemorrhage or mass lesion.     DX-CHEST-PORTABLE (1 VIEW)    Result Date: 2/28/2023 2/28/2023 11:11 AM HISTORY/REASON FOR EXAM:  possible sepsis.. TECHNIQUE/EXAM DESCRIPTION AND NUMBER OF VIEWS: Single portable view of the chest. COMPARISON: Chest x-ray 3/12/2021 FINDINGS: Heart size is within normal limits. No pulmonary infiltrates or consolidations are noted. No pleural abnormalities are noted.     No acute cardiac or pulmonary abnormalities are identified.    DX-FOOT-2- LEFT    Result Date: 2/28/2023 2/28/2023 1:44 PM HISTORY/REASON FOR EXAM:  Atraumatic Pain/Swelling/Deformity TECHNIQUE/EXAM DESCRIPTION AND NUMBER OF VIEWS: 2 views of the LEFT foot. COMPARISON:  MRI of the ankle 1/28/2023. Foot radiographs 1/21/2023 FINDINGS: Osteopenia is again present, decreasing sensitivity of the study for detection of acute displaced fracture. There is flat foot deformity as seen previously. No acute displaced fracture is noted. Mild solid periosteal reaction seen along the  second third and fourth metatarsal shafts is new on the second and third. There is no subluxation. There is probable prominent plaque-like ulceration of the heel.     Severe osteopenia and flat foot deformity without evidence of acute displaced fracture No definite radiographic evidence of osteomyelitis.    DX-TIBIA AND FIBULA LEFT    Result Date: 2023 2:25 PM HISTORY/REASON FOR EXAM:  Atraumatic Pain/Swelling/Deformity. TECHNIQUE/EXAM DESCRIPTION AND NUMBER OF VIEWS:  2 views of the LEFT tibia and fibula. COMPARISON: MRI ankle 2023 FINDINGS: There is generalized osteopenia. No acute or aggressive osseous abnormality. No dislocation. There is diffuse soft tissue swelling of the leg, edema and/or cellulitis.     1.  Diffuse soft tissue swelling, edema and/or cellulitis. 2.  No acute osseous abnormality. 3.  Osteopenia.    US-EXTREMITY VENOUS LOWER UNILAT LEFT    Result Date: 2023   Vascular Laboratory  CONCLUSIONS  No deep venous thrombosis in the common femoral, profunda femoral, femoral,  popliteal, and posterior tibial veins.  The peroneal veins could not be adequately evaluated to exclude thrombosis.  LESLIE BADILLO  Exam Date:     2023 14:21  Room #:     Inpatient  Priority:     Stat  Ht (in):             Wt (lb):  Ordering Physician:        PUNEET KEARNEY  Referring Physician:       CHRISTEL Rich  Sonographer:               Delfina Gregory RVT  Study Type:                Complete Unilateral  Technical Quality:         Adequate  Age:    47    Gender:     M  MRN:    [id number]  :    1975      BSA:  Indications:     Localized swelling, mass and lump, left lower limb  CPT Codes:       60498  ICD Codes:       R22.42  History:         Swelling/edema and redness of the left calf with ulcer of                   left foot.  Limitations:  PROCEDURES:  Left lower extremity venous duplex imaging.  The following venous structures were evaluated: common femoral, deep  femoral, proximal  "great saphenous, femoral, popliteal, peroneal, and  posterior tibial veins.  Serial compression, color, and spectral Doppler flow evaluations were  performed.  FINDINGS:  Left lower extremity.  The posterior tibial veins are difficult to assess for compressibility, but  flow response to augmentation is demonstrated.  The peroneal veins are difficult to assess for compressibility and flow  response to augmentation.  All other veins demonstrate complete color filling and compressibility with  normal venous flow dynamics including spontaneous flow and respiratory  phasicity.  No deep venous thrombosis in the common femoral, profunda femoral, femoral,  popliteal, and posterior tibial veins.  Cannot rule out deep venous thrombosis in the peroneal veins.  Flow was evaluated in the contralateral common femoral vein and normal  venous flow dynamics including spontaneous flow and respiratory phasic  variation were demonstrated.    Nas Beasley  (Electronically Signed)  Final Date:      28 February 2023                   15:13      Micro:  Results       Procedure Component Value Units Date/Time    Blood Culture [[id number]] Collected: 02/28/23 2011    Order Status: Completed Specimen: Blood from Peripheral Updated: 03/05/23 2300     Significant Indicator NEG     Source BLD     Site PERIPHERAL     Culture Result No growth after 5 days of incubation.    Narrative:      From different peripheral sites, if not done within the last  24 hours (Per Hospital Policy: Only change specimen source to  \"Line\" if specified by physician order)  Left Forearm/Arm    Blood Culture [[id number]] Collected: 02/28/23 2146    Order Status: Completed Specimen: Blood from Peripheral Updated: 03/05/23 2300     Significant Indicator NEG     Source BLD     Site PERIPHERAL     Culture Result No growth after 5 days of incubation.    Narrative:      From different peripheral sites, if not done within the last  24 hours (Per Hospital Policy: Only change " "specimen source to  \"Line\" if specified by physician order)  Right Forearm/Arm    Urine Culture (New) [[id number]] Collected: 03/01/23 0556    Order Status: Completed Specimen: Urine Updated: 03/03/23 0622     Significant Indicator NEG     Source UR     Site -     Culture Result No growth at 48 hours.    Narrative:      Indication for culture:->Evaluation for sepsis without a  clear source of infection  Indication for culture:->Evaluation for sepsis without a    Blood Culture [[id number]]  (Abnormal) Collected: 02/28/23 1111    Order Status: Completed Specimen: Blood from Peripheral Updated: 03/02/23 1631     Significant Indicator POS     Source BLD     Site PERIPHERAL     Culture Result Growth detected by Bactec instrument. 02/28/2023  20:18      Beta Hemolytic Streptococcus group A  See previous culture for sensitivity report.      Narrative:      CALL  [facility]  [facility] tel. [phone],  [facility] [phone] 02/28/2023, 20:20, RB PERF. RESULTS CALLED TO:  RN [phone]  Per Hospital Policy: Only change Specimen Src: to \"Line\" if  specified by physician order.  Left Hand    Blood Culture [[id number]]  (Abnormal)  (Susceptibility) Collected: 02/28/23 1110    Order Status: Completed Specimen: Blood from Peripheral Updated: 03/02/23 1631     Significant Indicator POS     Source BLD     Site PERIPHERAL     Culture Result Growth detected by Bactec instrument. 02/28/2023  19:24      Beta Hemolytic Streptococcus group A  This isolate does NOT demonstrate inducible Clindamycin  resistance in vitro.      Narrative:      CALL  [facility] tel. [phone],  CALLED  [facility] tel. [phone] 03/01/2023, 10:33, RB PERF. RESULTS CALLED  TO:Nani Goldsmith RN [GRPA Strep]  Per Hospital Policy: Only change Specimen Src: to \"Line\" if  specified by physician order.  Right AC    Susceptibility       Beta hemolytic streptococcus group a (1)       Antibiotic Interpretation Microscan   Method Status    Cefotaxime Sensitive 0.064 mcg/mL E-TEST " "Final    Penicillin Sensitive 0.047 mcg/mL E-TEST Final    Clindamycin Sensitive - mcg/mL E-TEST Final                       BLOOD CULTURE [[id number]]     Order Status: Canceled Specimen: Blood from Peripheral     BLOOD CULTURE [[id number]]     Order Status: Canceled Specimen: Blood from Peripheral     E-Test [[id number]] Collected: 02/28/23 1110    Order Status: Completed Specimen: Other Updated: 03/01/23 1033     ETEST Sensitivity INTERIM    Narrative:      [facility] tel. [phone] 03/01/2023, 10:33, RB PERF. RESULTS CALLED TO:Nani Goldsmith RN [GRPA Strep]  Per Hospital Policy: Only change Specimen Src: to \"Line\" if  specified by physician order.            Assessment:  Richard Hubbard II is a 47 y.o. male patient with rheumatoid arthritis with rheumatoid nodules on Humira, history of planovalgus deformities and chronic bilateral plantar foot ulcers since early 2022.  Patient was being evaluated at the [facility], plan was for outpatient MRI but this was delayed, eventually obtained on 1/28/2023, found to have marrow edema involving the navicular,  cuboid, first through third cuneiforms, 1st-3rd proximal metatarsals, concerning for osteomyelitis.  He was advised reconstructive surgeries but this has been delayed due to social issues.  About a week ago, patient began to experience worsening redness and swelling of the left lower extremity.  He presented to the ER.  Tmax is 101, no leukocytosis.  Blood cultures x2 from 2/28 positive for group A Strep.  Blood cultures from later that same day negative thus far.  CT scan with no obvious abscess. Orthopedics outpatient, no plans for surgery while inpatient.     Pertinent Diagnoses:  Leukocytosis, worse  Group A Strep bacteremia  Left leg tenosynovitis, worse  Left leg cellulitis, improved  Left foot chronic osteomyelitis  Left-sided tinea cruris  Rheumatoid arthritis  Chronic foot deformities, chronic bilateral foot ulcers  Immunosuppressed status    Plan:  -Continue IV " Ancef 2 g every 8 hours  -Repeat blood cultures x2 from 2/28 negative  -Worsening sepsis secondary to tenosynovitis of the tibialis posterior tendon, flexor digitorum longus and hallucis, consistent with the physical exam findings. Recommend orthopedics reevaluation for consideration of surgical intervention -canceled yesterday, possibly today, await plan, Ortho weighing pros and cons of different procedures     Disposition: Anticipate no ID specific disposition needs  Need for PICC line: No     Plan was discussed with the primary team, Dr. Moseley

## 2023-03-08 NOTE — PROGRESS NOTES
Case discussed with Dr Colindres, patient, and patients wife.    Plan is to move forward with [facility] tomorrow with Dr Reyes MARTINEZ after midnight

## 2023-03-08 NOTE — PROGRESS NOTES
Patient arrived on floor, oriented to room, cleaned up and skin check done. Call light within reach

## 2023-03-08 NOTE — DISCHARGE PLANNING
[facility]itional [facility] Coordination    Physiatry consult complete.  Dr. Pascual recommending -     Patient will need IPR when all surgeries have been completed, and patient is medically cleared and able to tolerate IPR    Not medically cleared.  TCC will follow.

## 2023-03-09 PROBLEM — F11.20 OPIOID DEPENDENCE (HCC): Status: ACTIVE | Noted: 2021-01-18

## 2023-03-09 NOTE — PROGRESS NOTES
Hospital Medicine Daily Progress Note    Date of Service  3/9/2023    Chief Complaint  Richard Hubbard II is a 47 y.o. male admitted 2/28/2023 with cellulitis and sepsis    Hospital Course  Richard Hubbard II is a 47 y.o. male who presented 2/28/2023 with history of rheumatoid arthritis on Humira with history of planovalgus deformities and foot ulcers with recent MRI concerning for osteomyelitis of the left navicular bone presented to the emergency department for evaluation of confusion and fever of 103 associated with hypoglycemia and worsening redness of the lower extremities more so on the left lower extremity.  He has a history of chronic pain syndrome with narcotic dependence.  The patient is not diabetic and has not had any prior history of hypoglycemia.  According to the wife he has been in the past on prednisone for rheumatoid arthritis flares but has not taken any over the past month.  No headache no loss of consciousness.  Patient was noted to be hypotensive and hypoglycemic received dextrose infusion antibiotics and fluids resuscitation and admitted to IMCU.  Blood cultures grew group A strep    MRI performed of the left knee through foot yesterday with new findings of extended tenosynovitis     Interval Problem Update  Patient was seen and examined at bedside  Reported overall joint pain due to arthritis    Plan for left BKA today    I discussed with ID, continue Ancef    I have discussed this patient's plan of care and discharge plan at IDT rounds today with Case Management, Nursing, Nursing leadership, and other members of the IDT team.    Consultants/Specialty  critical care    Code Status  Full Code    Disposition  Patient is not medically cleared for discharge.   Anticipate discharge to to home with close outpatient follow-up.  I have placed the appropriate orders for post-discharge needs.    Review of Systems  Review of Systems   Constitutional:  Positive for malaise/fatigue. Negative  for fever.   Respiratory:  Negative for shortness of breath.    Cardiovascular:  Negative for chest pain, palpitations and orthopnea.   Genitourinary:  Negative for dysuria, frequency and urgency.   Musculoskeletal:  Positive for joint pain.   Skin:  Positive for rash.   All other systems reviewed and are negative.     Physical Exam  Temp:  [36.6 °C (97.9 °F)-36.8 °C (98.2 °F)] 36.6 °C (97.9 °F)  Pulse:  [73-92] 73  Resp:  [16-18] 16  BP: ()/(65-71) 90/65  SpO2:  [93 %-95 %] 93 %    Physical Exam  Vitals and nursing note reviewed.   Constitutional:       Appearance: He is well-developed. He is not diaphoretic.   HENT:      Head: Normocephalic and atraumatic.      Mouth/Throat:      Pharynx: No oropharyngeal exudate.   Eyes:      General: No scleral icterus.        Right eye: No discharge.         Left eye: No discharge.      Pupils: Pupils are equal, round, and reactive to light.   Neck:      Vascular: No JVD.      Trachea: No tracheal deviation.   Cardiovascular:      Rate and Rhythm: Normal rate and regular rhythm.      Heart sounds: No murmur heard.    No friction rub. No gallop.   Pulmonary:      Effort: Pulmonary effort is normal. No respiratory distress.      Breath sounds: Normal breath sounds. No stridor. No wheezing.   Chest:      Chest wall: No tenderness.   Abdominal:      General: There is no distension.      Palpations: Abdomen is soft.      Tenderness: There is no abdominal tenderness. There is no rebound.      Hernia: A hernia (Soft and reducible) is present.   Musculoskeletal:         General: Swelling and tenderness present.      Cervical back: Neck supple.   Skin:     General: Skin is warm.      Findings: Erythema present.      Nails: There is no clubbing.      Comments: Left scrotal perineal rash    Left lower extremity erythema improved   Neurological:      Mental Status: He is alert and oriented to person, place, and time.      Cranial Nerves: No cranial nerve deficit.      Motor: No  weakness or abnormal muscle tone.   Psychiatric:         Behavior: Behavior normal.       Fluids    Intake/Output Summary (Last 24 hours) at 3/9/2023 1403  Last data filed at 3/9/2023 0921  Gross per 24 hour   Intake --   Output 2700 ml   Net -2700 ml       Laboratory  Recent Labs     03/07/23 0052 03/08/23  0528   WBC 20.8* 17.6*   RBC 3.58* 3.65*   HEMOGLOBIN 10.5* 10.5*   HEMATOCRIT 30.8* 31.3*   MCV 86.0 85.8   MCH 29.3 28.8   MCHC 34.1 33.5*   RDW 47.2 46.3   PLATELETCT 152* 197   MPV 10.4 9.9     Recent Labs     03/07/23 0052 03/08/23  0528   SODIUM 128* 124*   POTASSIUM 4.3 4.6   CHLORIDE 98 96   CO2 21 22   GLUCOSE 92 84   BUN 8 9   CREATININE 0.45* 0.49*   CALCIUM 6.8* 7.0*                     Imaging  [id number] & W/O LEFT   Final Result      1.  No evidence of left tibial or fibular osteomyelitis or fracture.      2.  Diffuse cellulitis      3.  Atrophy of the muscles of the lower leg with edema and patchy enhancement consistent with myositis. This involves the tibialis posterior muscle to the greatest degree.      4.  Some fluid seen superficial to the medial head of the gastrocnemius muscle. There is no evidence of rim enhancement to suggest presence of abscess.      MR-FOOT-WITH & W/O LEFT   Final Result      1.  Again seen marrow edema involving the medial aspect of the navicular and the adjacent first cuneiform with some enhancement of that area with overlying soft tissue ulceration and some fluid within the overlying soft tissues consistent with    osteomyelitis versus reactive change. This is unchanged from comparison.      2.  Marrow edema with small nondisplaced trabecular fracture involving the first metatarsal head with enhancement of that area. Differential diagnosis includes nondisplaced stress/insufficiency fracture versus osteomyelitis.      3.  Again seen marrow edema involving the base of the second metatarsal with some abnormal enhancement with a small possible linear  nondisplaced trabecular fracture extending through that area likely representing a small nondisplaced insufficiency    fracture. There is also arthropathy of that joint.      4.  Again seen marrow edema involving the cuneiforms and the adjacent metatarsals likely representing osteoarthritis.      5.  Osteoarthritis of the tibiotalar joint with chronic osteochondral injury of the lateral aspect of the talar dome. There is also osteoarthritis of the subtalar, talonavicular and calcaneocuboid articulations.      6.  Cellulitis involving the hindfoot most prominently medially.      7.  Prominent tenosynovitis of the tibialis posterior tendon and to a lesser degree the flexor digitorum longus and flexor hallucis tendons. This underlies the area of soft tissue ulceration and could potentially represent infectious tenosynovitis. This    is new from comparison.      8.  Pes planus and valgus deformity of the hindfoot.      CT-EXTREMITY, LOWER WITH LEFT   Final Result      1.  No evidence of acute osteomyelitis of the left lower extremity.      2.  No evidence of deep abscess formation in the subcutaneous tissues.      US-EXTREMITY VENOUS LOWER UNILAT LEFT   Final Result      DX-TIBIA AND FIBULA LEFT   Final Result      1.  Diffuse soft tissue swelling, edema and/or cellulitis.   2.  No acute osseous abnormality.   3.  Osteopenia.      DX-FOOT-2- LEFT   Final Result      Severe osteopenia and flat foot deformity without evidence of acute displaced fracture      No definite radiographic evidence of osteomyelitis.      CT-HEAD W/O   Final Result      Head CT without contrast within normal limits. No evidence of acute cerebral infarction, hemorrhage or mass lesion.         DX-CHEST-PORTABLE (1 VIEW)   Final Result      No acute cardiac or pulmonary abnormalities are identified.             Assessment/Plan  * Sepsis (HCC)- (present on admission)  Assessment & Plan  Sepsis secondary to LLE tenosynovitis, group A strep   bacteremia  Continue cefazolin cefazolin  Duplex negative for DVT x-rays negative for acute bony pathology  Patient evaluated by orthopedics with nonsurgical management recommended and outpatient follow-up with Dr. Pryor  Continue wound care  Evaluated by ID    3/9 worsening sepsis due to   Plan for BKA for source control    Bacteremia  Assessment & Plan  Group A strep bacteremia  Repeated culture 2/28 negative    ID following    Continue Ancef    Immunocompromised state due to drug therapy (HCC)  Assessment & Plan  Has been maintained on Humira and intermittently on prednisone     Tenosynovitis of left lower leg  Assessment & Plan  MRI showed tenosynovitis of the tibialis posterior tendon, flexor digitorum longus and hallucis,     3/9 Plan for BKA today  ID following  Continue ancef    Yeast dermatitis  Assessment & Plan  Started on Diflucan and topical clotrimazole  Skin care    Electrolyte abnormality  Assessment & Plan  Hypokalemia  Hypomagnesemia  Hypophosphatemia  Hypocalcemia    Likely component of refeeding syndrome patient started on thiamine supplements    Replete with K-Phos and IV magnesium sulfate   Start calcium citrate supplements   Monitor electrolytes    Thrombocytopenia (HCC)  Assessment & Plan  Platelet counts 85K likely reactive secondary to sepsis  Monitor CBC        Hypoglycemia  Assessment & Plan  Likely secondary to poor intake  Hypoglycemia protocol  Continue D5 LR infusion overnight and if p.o. intake remains stable will DC in a.m.  Monitor CBGs    Ulcer of both feet (HCC)- (present on admission)  Assessment & Plan  Wound care  CT negative for abscess  MRI concerning for osteomyelitis patient evaluated by LPS continue wound care  Continue Ancef  Discussed with ID Dr. Galdamez recommends transitioning to p.o. Zyvox on discharge through 3/9/2023 followed by p.o. amoxicillin 1 g twice daily for 3 to 4 weeks and outpatient follow-up with ID and orthopedics  MRI performed of the left knee  through foot yesterday with new findings of tenosynovitis which may be the explanation for his persistent elevated temperatures and mild leukocytosis.  ID following and will cont on IV abx for now     3/8 I discussed with Ortho, plan for L BKA tomorrow with Dr Chambers    Opioid dependence ([facility])- (present on admission)  Assessment & Plan  With narcotic dependence    Patient reported that he uses contin 30mg bid and morphine 30 mg every 6 hours. However, I confirmed with pharmacy, it appears that his home dose is contin 15mg bid and morphine 15 mg every 6 hours    He has been following with Pain Dr.    The dose were increased to contin 30mg bid and morphine 30 mg every 6 hours per his request and given his acute issuses    History of immunosuppressive therapy- (present on admission)  Assessment & Plan  Home biweekly Humira and intermittent steroids        Rheumatoid arthritis ([facility])- (present on admission)  Assessment & Plan  Has been maintained on Humira and intermittently on prednisone although no recent steroid use  Continue to hold Humira  Cortisol level 45    tapered off hydrocortisone    History of bleeding ulcers  Assessment & Plan  Continue prilosec  Patient declines pharmacologic DVT prophylaxis discussed RBA       Please note that this dictation was created using voice recognition software. I have made every reasonable attempt to correct obvious errors, but I expect that there are errors of grammar and possibly context that I did not discover before finalizing the note.    VTE prophylaxis: SCDs/TEDsNote patient reports history of severe gastrointestinal bleed with any anticoagulant per patient request no anticoagulant for DVT prophylaxis ordere  I have performed a physical exam and reviewed and updated ROS and Plan today (3/9/2023). In review of yesterday's note (3/8/2023), there are no changes except as documented above.

## 2023-03-09 NOTE — PROGRESS NOTES
4 Eyes Skin Assessment Completed by FAITH Stauffer and Ivana RN.    Head WDL  Ears WDL  Nose WDL  Mouth scab  Neck WDL  Breast/Chest WDL  Shoulder Blades Redness and Blanching  Spine Redness and Blanching, rash  (R) Arm/Elbow/Hand Redness, Blanching, Bruising, and Scab  (L) Arm/Elbow/Hand Redness, Blanching, and Scab  Abdomen Hernia   Groin Redness and Excoriation  Scrotum/Coccyx/Buttocks Redness, Blanching, Excoriation, and Moisture Fissure  (R) Leg Scar discoloration  (L) Leg discoloration  (R) Heel/Foot/Toe Redness and Blanching, RA deformity  (L) Heel/Foot/Toe Redness and Blanching, RA deformity          Devices In Places Blood Pressure Cuff, Pulse Ox, and SCD's      Interventions In Place TAP System, Pillows, Low Air Loss Mattress, Barrier Cream, and Heels Loaded W/Pillows    Possible Skin Injury Yes    Pictures Uploaded Into Epic Yes  Wound Consult Placed Yes  RN Wound Prevention Protocol Ordered Yes

## 2023-03-09 NOTE — THERAPY
Physical Therapy   Daily Treatment     Patient Name: Richard Hubbard II  Age:  47 y.o., Sex:  male  Medical Record #: [id number]  Today's Date: 3/8/2023    Precautions: Fall Risk;Weight Bearing As Tolerated Right Lower Extremity;Weight Bearing As Tolerated Left Lower Extremity    Assessment  Pt motivated to participate but limited by generalized pain, required mod A for mobility and tolerated 1 STS with FWW before needing to return supine. Encouraged EOB daily to promote incr activity tolerance, pt able to maintain balance sitting EOB. Still pending further sx per chart. Will follow.     Plan  Continue Current Treatment Plan  Type of Treatment: Bed Mobility, Equipment, Gait Training, Neuro Re-Education / Balance, Self Care / Home Evaluation, Stair Training, Therapeutic Activities, Therapeutic Exercise  Treatment Frequency: 3 Times per Week  Treatment Duration: Until Therapy Goals Met    DC Equipment Recommendations: Unable to determine at this time  Discharge Recommendations: Recommend post-acute placement for additional physical therapy services prior to discharge home     03/08/23 1109   Cognition    Level of Consciousness Alert   Comments motivated   Balance   Sitting Balance (Static) Fair   Sitting Balance (Dynamic) Fair -   Standing Balance (Static) Trace +   Weight Shift Sitting Poor   Weight Shift Standing Poor   Skilled Intervention Verbal Cuing;Postural Facilitation   Comments standing with FWW   Bed Mobility    Supine to Sit Moderate Assist   Sit to Supine Moderate Assist   Skilled Intervention Verbal Cuing;Sequencing;Compensatory Strategies   Gait Analysis   Gait Level Of Assist Unable to Participate   Weight Bearing Status BLE WBAT   Functional Mobility   Sit to Stand Moderate Assist   Skilled Intervention Verbal Cuing;Sequencing   Comments limited standing tolerance d/t pain and requesting to return to bed   Short Term Goals    Short Term Goal # 1 Pt will perform supine <> sit without bed features  with SPV in 6 visits to progress functional independence   Goal Outcome # 1 goal not met   Short Term Goal # 2 Pt will perform STS/functional transfers with FWW and min A in 6 visits to progress OOB mobility   Goal Outcome # 2 Goal not met   Short Term Goal # 3 Pt will ambulate 50 ft with FWW and mod A in 6 visits to progress gait training   Goal Outcome # 3 Goal not met   Short Term Goal # 4 Pt will negotiate 2 steps with mod A in 6 visits to improve home access   Goal Outcome # 4 Goal not met

## 2023-03-09 NOTE — PROGRESS NOTES
Infectious Disease Progress Note    Author: Romie Galdamez M.D. Date & Time of service: 3/9/2023  8:23 AM    Chief Complaint:  Follow-up for osteomyelitis    Interval History:  3/5 had another fever spike to 101.1 today, white count slightly up to 11.2, tolerating antimicrobial.  Fast asleep this morning  3/6 Tmax 99.1, no CBC this morning, Patient notes feeling much worse overall, persistent pain and tenderness over posterior distal leg.  MRI findings as below  3/7 Tmax 100, white count is up to 20.8 today, surgery is planning intervention today.  Patient continues to feel bad with significant posterior distal left leg pain  3/8 patient is afebrile, white count remains persistently elevated at 17.6, surgery was not performed yesterday  3/9 patient remains afebrile, no CBC this morning, plan is to undergo left BKA today.  Patient remains quite uncomfortable due to pain    Labs Reviewed and Medications Reviewed.    Review of Systems:  Review of Systems   Constitutional:  Negative for chills and fever.   Gastrointestinal:  Negative for abdominal pain, diarrhea, nausea and vomiting.   Musculoskeletal:  Positive for myalgias.   Skin:  Negative for itching and rash.   All other systems reviewed and are negative.    Hemodynamics:  Temp (24hrs), Av.8 °C (98.3 °F), Min:36.6 °C (97.9 °F), Max:37.2 °C (99 °F)  Temperature: 36.6 °C (97.9 °F)  Pulse  Av.3  Min: 59  Max: 102   Blood Pressure: 90/65       Physical Exam:  Physical Exam  Vitals and nursing note reviewed.   Constitutional:       General: He is not in acute distress.     Appearance: He is not ill-appearing.   HENT:      Nose: No congestion.   Eyes:      General: No scleral icterus.        Right eye: No discharge.         Left eye: No discharge.      Conjunctiva/sclera: Conjunctivae normal.   Cardiovascular:      Rate and Rhythm: Normal rate.      Heart sounds: No murmur heard.  Pulmonary:      Effort: Pulmonary effort is normal. No respiratory distress.       Breath sounds: No stridor.   Abdominal:      General: Abdomen is flat. There is no distension.      Palpations: There is no mass.      Comments: Left-sided tinea cruris   Musculoskeletal:         General: Swelling and tenderness present.      Comments: Bilateral plantar chronic wounds, left foot swelling without significant erythema.    Left leg anterior cellulitis has significantly improved with wrinkling of skin with the exception of a persistently tender, posterior distal leg, pain with dorsiflexion of the foot    Skin:     Findings: Erythema and rash present.      Comments: In addition to above, small papular erythematous lesions, patient states the eruptions appear quite frequently during RA flares   Neurological:      General: No focal deficit present.      Mental Status: He is alert and oriented to person, place, and time.   Psychiatric:         Mood and Affect: Mood normal.         Behavior: Behavior normal.       Meds:    Current Facility-Administered Medications:     miconazole    [DISCONTINUED] oxyCODONE immediate-release **OR** [DISCONTINUED] oxyCODONE immediate-release **OR** HYDROmorphone    ketoconazole    morphine    calcium citrate    thiamine    zolpidem    nicotine    D5LR    ceFAZolin    morphine ER    omeprazole    senna-docusate **AND** polyethylene glycol/lytes **AND** magnesium hydroxide **AND** bisacodyl    LR    acetaminophen    Notify provider if pain remains uncontrolled **AND** Use the Numeric Rating Scale (NRS), Sy-Baker Faces (WBF), or FLACC on regular floors and Critical-Care Pain Observation Tool (CPOT) on ICUs/Trauma to assess pain **AND** Pulse Ox **AND** Pharmacy Consult Request **AND** If patient difficult to arouse and/or has respiratory depression (respiratory rate of 10 or less), stop any opiates that are currently infusing and call a Rapid Response.    Notify MD and PharmD if FSBG level is less than or equal to 70 mg/dL or patient is showing signs/symptoms of hypoglycemia  (tachycardia, palpitations, diaphoresis, clammy, tremulousness, nausea, confused) **AND** Administer 20 grams of glucose (approximately 8 ounces of fruit juice) every 15 minutes PRN FSBS less than 70 mg/dL **AND** dextrose bolus    Labs:  Recent Labs     03/07/23 0052 03/08/23 0528   WBC 20.8* 17.6*   RBC 3.58* 3.65*   HEMOGLOBIN 10.5* 10.5*   HEMATOCRIT 30.8* 31.3*   MCV 86.0 85.8   MCH 29.3 28.8   RDW 47.2 46.3   PLATELETCT 152* 197   MPV 10.4 9.9   NEUTSPOLYS 87.00*  --    LYMPHOCYTES 4.30*  --    MONOCYTES 6.90  --    EOSINOPHILS 0.90  --    BASOPHILS 0.00  --    RBCMORPHOLO Present  --        Recent Labs     03/07/23 0052 03/08/23 0528   SODIUM 128* 124*   POTASSIUM 4.3 4.6   CHLORIDE 98 96   CO2 21 22   GLUCOSE 92 84   BUN 8 9       Recent Labs     03/07/23 0052 03/08/23 0528   CREATININE 0.45* 0.49*         Imaging:  CT-EXTREMITY, LOWER WITH LEFT    Result Date: 2/28/2023 2/28/2023 4:10 PM HISTORY/REASON FOR EXAM:  below the knee down, concern for infectious process. TECHNIQUE/EXAM DESCRIPTION AND NUMBER OF VIEWS:  CT scan of the LEFT lower extremity with contrast, with reconstructions. Thin helical 3 mm sections were obtained from the distal femur through the proximal tibia/fibula. Sagittal and coronal multiplanar reconstructions were generated from the axial images. A total of 100 mL of Omnipaque 350 nonionic contrast was administered  IV without complication. Up to date radiation dose reduction adjustments have been utilized to meet ALARA standards for radiation dose reduction. COMPARISON: None. FINDINGS: There is normal bony mineralization of the left tibia and fibula. There is no evidence for acute or chronic osteomyelitis. Additionally there is evidence of no deep abscess formation in the left lower leg or foot.     1.  No evidence of acute osteomyelitis of the left lower extremity. 2.  No evidence of deep abscess formation in the subcutaneous tissues.    CT-HEAD W/O    Result Date:  2/28/2023 2/28/2023 12:13 PM HISTORY/REASON FOR EXAM:  Mental status change, unknown cause. TECHNIQUE/EXAM DESCRIPTION AND NUMBER OF VIEWS: CT of the head without contrast. The study was performed on a helical multidetector CT scanner. Contiguous axial sections were obtained from the skull base through the vertex. Up to date radiation dose reduction adjustments have been utilized to meet ALARA standards for radiation dose reduction. COMPARISON:  Head CT 6/28/2010 FINDINGS: The calvariae and skull base are unremarkable. There are no extraaxial fluid collections. The ventricular system and basal cisterns are within normal limits. There are no areas of abnormal density in the brain substance. There are no hemorrhagic lesions.  There are no mass effects or shift of midline structures. The brainstem and posterior fossa structures are unremarkable. Paranasal sinuses in the field of view are unremarkable. Mastoids in the field of view are unremarkable.     Head CT without contrast within normal limits. No evidence of acute cerebral infarction, hemorrhage or mass lesion.     DX-CHEST-PORTABLE (1 VIEW)    Result Date: 2/28/2023 2/28/2023 11:11 AM HISTORY/REASON FOR EXAM:  possible sepsis.. TECHNIQUE/EXAM DESCRIPTION AND NUMBER OF VIEWS: Single portable view of the chest. COMPARISON: Chest x-ray 3/12/2021 FINDINGS: Heart size is within normal limits. No pulmonary infiltrates or consolidations are noted. No pleural abnormalities are noted.     No acute cardiac or pulmonary abnormalities are identified.    DX-FOOT-2- LEFT    Result Date: 2/28/2023 2/28/2023 1:44 PM HISTORY/REASON FOR EXAM:  Atraumatic Pain/Swelling/Deformity TECHNIQUE/EXAM DESCRIPTION AND NUMBER OF VIEWS: 2 views of the LEFT foot. COMPARISON:  MRI of the ankle 1/28/2023. Foot radiographs 1/21/2023 FINDINGS: Osteopenia is again present, decreasing sensitivity of the study for detection of acute displaced fracture. There is flat foot deformity as seen  previously. No acute displaced fracture is noted. Mild solid periosteal reaction seen along the second third and fourth metatarsal shafts is new on the second and third. There is no subluxation. There is probable prominent plaque-like ulceration of the heel.     Severe osteopenia and flat foot deformity without evidence of acute displaced fracture No definite radiographic evidence of osteomyelitis.    DX-TIBIA AND FIBULA LEFT    Result Date: 2023 2:25 PM HISTORY/REASON FOR EXAM:  Atraumatic Pain/Swelling/Deformity. TECHNIQUE/EXAM DESCRIPTION AND NUMBER OF VIEWS:  2 views of the LEFT tibia and fibula. COMPARISON: MRI ankle 2023 FINDINGS: There is generalized osteopenia. No acute or aggressive osseous abnormality. No dislocation. There is diffuse soft tissue swelling of the leg, edema and/or cellulitis.     1.  Diffuse soft tissue swelling, edema and/or cellulitis. 2.  No acute osseous abnormality. 3.  Osteopenia.    US-EXTREMITY VENOUS LOWER UNILAT LEFT    Result Date: 2023   Vascular Laboratory  CONCLUSIONS  No deep venous thrombosis in the common femoral, profunda femoral, femoral,  popliteal, and posterior tibial veins.  The peroneal veins could not be adequately evaluated to exclude thrombosis.  LESLIE BADILLO  Exam Date:     2023 14:21  Room #:     Inpatient  Priority:     Stat  Ht (in):             Wt (lb):  Ordering Physician:        PUNEET KEARNEY  Referring Physician:       [id number] Peralta  Sonographer:               Delfina Gregory RVT  Study Type:                Complete Unilateral  Technical Quality:         Adequate  Age:    47    Gender:     M  MRN:    [id number]  :    1975      BSA:  Indications:     Localized swelling, mass and lump, left lower limb  CPT Codes:       88804  ICD Codes:       R22.42  History:         Swelling/edema and redness of the left calf with ulcer of                   left foot.  Limitations:  PROCEDURES:  Left lower extremity venous duplex  "imaging.  The following venous structures were evaluated: common femoral, deep  femoral, proximal great saphenous, femoral, popliteal, peroneal, and  posterior tibial veins.  Serial compression, color, and spectral Doppler flow evaluations were  performed.  FINDINGS:  Left lower extremity.  The posterior tibial veins are difficult to assess for compressibility, but  flow response to augmentation is demonstrated.  The peroneal veins are difficult to assess for compressibility and flow  response to augmentation.  All other veins demonstrate complete color filling and compressibility with  normal venous flow dynamics including spontaneous flow and respiratory  phasicity.  No deep venous thrombosis in the common femoral, profunda femoral, femoral,  popliteal, and posterior tibial veins.  Cannot rule out deep venous thrombosis in the peroneal veins.  Flow was evaluated in the contralateral common femoral vein and normal  venous flow dynamics including spontaneous flow and respiratory phasic  variation were demonstrated.    Nas Beasley  (Electronically Signed)  Final Date:      28 February 2023                   15:13      Micro:  Results       Procedure Component Value Units Date/Time    Blood Culture [[id number]] Collected: 02/28/23 2011    Order Status: Completed Specimen: Blood from Peripheral Updated: 03/05/23 2300     Significant Indicator NEG     Source BLD     Site PERIPHERAL     Culture Result No growth after 5 days of incubation.    Narrative:      From different peripheral sites, if not done within the last  24 hours (Per Hospital Policy: Only change specimen source to  \"Line\" if specified by physician order)  Left Forearm/Arm    Blood Culture [[id number]] Collected: 02/28/23 2146    Order Status: Completed Specimen: Blood from Peripheral Updated: 03/05/23 2300     Significant Indicator NEG     Source BLD     Site PERIPHERAL     Culture Result No growth after 5 days of incubation.    Narrative:      From " "different peripheral sites, if not done within the last  24 hours (Per Hospital Policy: Only change specimen source to  \"Line\" if specified by physician order)  Right Forearm/Arm    Urine Culture (New) [[id number]] Collected: 03/01/23 0556    Order Status: Completed Specimen: Urine Updated: 03/03/23 0622     Significant Indicator NEG     Source UR     Site -     Culture Result No growth at 48 hours.    Narrative:      Indication for culture:->Evaluation for sepsis without a  clear source of infection  Indication for culture:->Evaluation for sepsis without a    Blood Culture [[id number]]  (Abnormal) Collected: 02/28/23 1111    Order Status: Completed Specimen: Blood from Peripheral Updated: 03/02/23 1631     Significant Indicator POS     Source BLD     Site PERIPHERAL     Culture Result Growth detected by Bactec instrument. 02/28/2023  20:18      Beta Hemolytic Streptococcus group A  See previous culture for sensitivity report.      Narrative:      [facility]  Sw[phone],  CALLED  [facility] tel. [phone] 02/28/2023, 20:20, RB PERF. RESULTS CALLED TO:  RN [id number]  Per Hospital Policy: Only change Specimen Src: to \"Line\" if  specified by physician order.  Left Hand    Blood Culture [[id number]]  (Abnormal)  (Susceptibility) Collected: 02/28/23 1110    Order Status: Completed Specimen: Blood from Peripheral Updated: 03/02/23 1631     Significant Indicator POS     Source BLD     Site PERIPHERAL     Culture Result Growth detected by Bactec instrument. 02/28/2023  19:24      Beta Hemolytic Streptococcus group A  This isolate does NOT demonstrate inducible Clindamycin  resistance in vitro.      Narrative:      [facility]  Swann  [facility] [phone],  CALLED  [facility] tel. [phone] 03/01/2023, 10:33, RB PERF. RESULTS CALLED  TO:Nani Goldsmith RN [GRPA Strep]  Per Hospital Policy: Only change Specimen Src: to \"Line\" if  specified by physician order.  Right AC    Susceptibility       Beta hemolytic streptococcus group a (1)  "      Antibiotic Interpretation Microscan   Method Status    Cefotaxime Sensitive 0.064 mcg/mL E-TEST Final    Penicillin Sensitive 0.047 mcg/mL E-TEST Final    Clindamycin Sensitive - mcg/mL E-TEST Final                               Assessment:  Richard Hubbard II is a 47 y.o. male patient with rheumatoid arthritis with rheumatoid nodules on Humira, history of planovalgus deformities and chronic bilateral plantar foot ulcers since early 2022.  Patient was being evaluated at the [facility], plan was for outpatient MRI but this was delayed, eventually obtained on 1/28/2023, found to have marrow edema involving the navicular,  cuboid, first through third cuneiforms, 1st-3rd proximal metatarsals, concerning for osteomyelitis.  He was advised reconstructive surgeries but this has been delayed due to social issues.  About a week ago, patient began to experience worsening redness and swelling of the left lower extremity.  He presented to the ER.  Tmax is 101, no leukocytosis.  Blood cultures x2 from 2/28 positive for group A Strep.  Blood cultures from later that same day negative thus far.  CT scan with no obvious abscess. Orthopedics outpatient, no plans for surgery while inpatient.     Pertinent Diagnoses:  Leukocytosis  Group A Strep bacteremia  Left leg tenosynovitis, worse  Left leg cellulitis, improved  Left foot chronic osteomyelitis  Left-sided tinea cruris  Rheumatoid arthritis  Chronic foot deformities, chronic bilateral foot ulcers  Immunosuppressed status    Plan:  -Continue IV Ancef 2 g every 8 hours  -Repeat blood cultures x2 from 2/28 negative  -Worsening sepsis secondary to tenosynovitis of the tibialis posterior tendon, flexor digitorum longus and hallucis, consistent with the physical exam findings.  Orthopedics plans left BKA today     Disposition: Anticipate no ID specific disposition needs  Need for PICC line: No     Plan was discussed with Dr. Moseley

## 2023-03-09 NOTE — PROGRESS NOTES
Patients IV infiltrated; multiple unsuccessful attempts for new IV placement including using US machine. MD notified.

## 2023-03-09 NOTE — ANESTHESIA PREPROCEDURE EVALUATION
Case: [id number] Date/Time: 03/09/23 1550    Procedure: AMPUTATION, BELOW KNEE (Left: Leg Lower)    Anesthesia type: General    Location: [place] [address] / SURGERY [facility]    Surgeons: Wayne Chambers M.D.          Relevant Problems   ANESTHESIA   (positive) MONICA (obstructive sleep apnea)      CARDIAC   (positive) Aortic root dilatation (HCC)   (positive) Aortic stenosis, moderate   (positive) H/o  essential hypertension   (positive) Paroxysmal atrial fibrillation (HCC)      Other   (positive) Rheumatoid arthritis (HCC)       Physical Exam    Airway   Mallampati: III  TM distance: >3 FB  Neck ROM: full       Cardiovascular - normal exam  Rhythm: regular  Rate: normal  (-) murmur     Dental - normal exam           Pulmonary - normal exam  Breath sounds clear to auscultation     Abdominal    Neurological - normal exam                 Anesthesia Plan    ASA 2       Plan - general       Airway plan will be LMA          Induction: intravenous    Postoperative Plan: Postoperative administration of opioids is intended.    Pertinent diagnostic labs and testing reviewed    Informed Consent:    Anesthetic plan and risks discussed with patient.    Use of blood products discussed with: patient whom consented to blood products.

## 2023-03-09 NOTE — CARE PLAN
The patient is Stable - Low risk of patient condition declining or worsening    Shift Goals  Clinical Goals: Adequate pain control, safety  Patient Goals: Adeqate pain control    Progress made toward(s) clinical / shift goals:      Problem: Pain - Standard  Goal: Alleviation of pain or a reduction in pain to the patient’s comfort goal  Outcome: Progressing  Flowsheets (Taken 3/9/2023 6357)  Pain Rating Scale (NPRS): 8  Note: Medicated per MAR, provided extra pillows and blankets for support and repositioning. Will continue to assess and treat accordingly.     Problem: Fall Risk  Goal: Patient will remain free from falls  Outcome: Progressing  Note: Appropriate fall precautions in place.

## 2023-03-09 NOTE — CARE PLAN
The patient is Stable - Low risk of patient condition declining or worsening    Shift Goals  Clinical Goals: pain control, monitor skin integrity  Patient Goals: plan of care, pain control  Family Goals: not present    Progress made toward(s) clinical / shift goals:  Patient educated on the pain scale and to notify RN of pain. Patient verbalizes pain control using PRN oral and IV medications. Patient encouraged to notify RN if pain remains uncontrolled. Patient medicated per MAR.      Patient is not progressing towards the following goals:

## 2023-03-09 NOTE — CARE PLAN
The patient is Stable - Low risk of patient condition declining or worsening    Shift Goals  Clinical Goals: pain control, monitor skin integrity  Patient Goals: plan of care, pain control  Family Goals: not present    Progress made toward(s) clinical / shift goals:        Problem: Pain - Standard  Goal: Alleviation of pain or a reduction in pain to the patient’s comfort goal  Outcome: Progressing  Patient medicated per MAR for chronic and acute pain.      Problem: Urinary - Renal Perfusion  Goal: Ability to achieve and maintain adequate renal perfusion and functioning will improve  Outcome: Progressing   Patient able to use the urinal independently with moderate output throughout the shift.    Problem: Respiratory  Goal: Patient will achieve/maintain optimum respiratory ventilation and gas exchange  Outcome: Progressing     Problem: Skin Integrity  Goal: Skin integrity is maintained or improved  Outcome: Progressing  Patient skin integrity maintained or improved with the use of prescribed topical creams.

## 2023-03-09 NOTE — THERAPY
Physical Therapy Contact Note    Patient Name: Richard Hubbard II  Age:  47 y.o., Sex:  male  Medical Record #: [id number]  Today's Date: 3/9/2023    Per chart, pt to OR today for CHELI PIKE. Will hold PT session until post-op and appropriate.    Nicholas Sloan PT, DPT

## 2023-03-10 NOTE — PROGRESS NOTES
Infectious Disease Progress Note    Author: Romie Galdamez M.D. Date & Time of service: 3/10/2023  10:34 AM    Chief Complaint:  Follow-up for osteomyelitis    Interval History:  3/5 had another fever spike to 101.1 today, white count slightly up to 11.2, tolerating antimicrobial.  Fast asleep this morning  3/6 Tmax 99.1, no CBC this morning, Patient notes feeling much worse overall, persistent pain and tenderness over posterior distal leg.  MRI findings as below  3/7 Tmax 100, white count is up to 20.8 today, surgery is planning intervention today.  Patient continues to feel bad with significant posterior distal left leg pain  3/8 patient is afebrile, white count remains persistently elevated at 17.6, surgery was not performed yesterday  3/9 patient remains afebrile, no CBC this morning, plan is to undergo left BKA today.  Patient remains quite uncomfortable due to pain  3/10 Tmax 99.3, patient underwent left BKA yesterday.  Plan as below    Labs Reviewed and Medications Reviewed.    Review of Systems:  Review of Systems   Unable to perform ROS: Other     Hemodynamics:  Temp (24hrs), Av.4 °C (97.5 °F), Min:35.9 °C (96.7 °F), Max:37.4 °C (99.3 °F)  Temperature: 36.3 °C (97.3 °F)  Pulse  Av.1  Min: 59  Max: 102   Blood Pressure: 109/77       Physical Exam:  Physical Exam  Vitals and nursing note reviewed.   Constitutional:       General: He is not in acute distress.     Appearance: He is not ill-appearing.   HENT:      Nose: No congestion.   Eyes:      General: No scleral icterus.        Right eye: No discharge.         Left eye: No discharge.      Conjunctiva/sclera: Conjunctivae normal.   Cardiovascular:      Rate and Rhythm: Normal rate.      Heart sounds: No murmur heard.  Pulmonary:      Effort: Pulmonary effort is normal. No respiratory distress.      Breath sounds: No stridor.   Abdominal:      General: Abdomen is flat. There is no distension.      Palpations: There is no mass.      Comments:  Left-sided tinea cruris   Musculoskeletal:         General: Swelling and tenderness present.      Comments: Left BKA surgical   Skin:     Findings: Erythema and rash present.      Comments: In addition to above, small papular erythematous lesions, patient states the eruptions appear quite frequently during RA flares   Neurological:      General: No focal deficit present.      Mental Status: He is alert and oriented to person, place, and time.   Psychiatric:         Mood and Affect: Mood normal.         Behavior: Behavior normal.       Meds:    Current Facility-Administered Medications:     morphine ER    D5 NS    miconazole    [DISCONTINUED] oxyCODONE immediate-release **OR** [DISCONTINUED] oxyCODONE immediate-release **OR** HYDROmorphone    ketoconazole    morphine    calcium citrate    thiamine    zolpidem    nicotine    ceFAZolin    omeprazole    senna-docusate **AND** polyethylene glycol/lytes **AND** magnesium hydroxide **AND** bisacodyl    LR    acetaminophen    Notify provider if pain remains uncontrolled **AND** Use the Numeric Rating Scale (NRS), Sy-Baker Faces (WBF), or FLACC on regular floors and Critical-Care Pain Observation Tool (CPOT) on ICUs/Trauma to assess pain **AND** Pulse Ox **AND** Pharmacy Consult Request **AND** If patient difficult to arouse and/or has respiratory depression (respiratory rate of 10 or less), stop any opiates that are currently infusing and call a Rapid Response.    Notify MD and PharmD if FSBG level is less than or equal to 70 mg/dL or patient is showing signs/symptoms of hypoglycemia (tachycardia, palpitations, diaphoresis, clammy, tremulousness, nausea, confused) **AND** Administer 20 grams of glucose (approximately 8 ounces of fruit juice) every 15 minutes PRN FSBS less than 70 mg/dL **AND** dextrose bolus    Labs:  Recent Labs     03/08/23  0528   WBC 17.6*   RBC 3.65*   HEMOGLOBIN 10.5*   HEMATOCRIT 31.3*   MCV 85.8   MCH 28.8   RDW 46.3   PLATELETCT 197   MPV 9.9        Recent Labs     03/08/23  0528 03/10/23  0431   SODIUM 124* 127*   POTASSIUM 4.6 4.7   CHLORIDE 96 101   CO2 22 18*   GLUCOSE 84 152*   BUN 9 12       Recent Labs     03/08/23  0528 03/10/23  0431   CREATININE 0.49* 0.48*         Imaging:  CT-EXTREMITY, LOWER WITH LEFT    Result Date: 2/28/2023 2/28/2023 4:10 PM HISTORY/REASON FOR EXAM:  below the knee down, concern for infectious process. TECHNIQUE/EXAM DESCRIPTION AND NUMBER OF VIEWS:  CT scan of the LEFT lower extremity with contrast, with reconstructions. Thin helical 3 mm sections were obtained from the distal femur through the proximal tibia/fibula. Sagittal and coronal multiplanar reconstructions were generated from the axial images. A total of 100 mL of Omnipaque 350 nonionic contrast was administered  IV without complication. Up to date radiation dose reduction adjustments have been utilized to meet ALARA standards for radiation dose reduction. COMPARISON: None. FINDINGS: There is normal bony mineralization of the left tibia and fibula. There is no evidence for acute or chronic osteomyelitis. Additionally there is evidence of no deep abscess formation in the left lower leg or foot.     1.  No evidence of acute osteomyelitis of the left lower extremity. 2.  No evidence of deep abscess formation in the subcutaneous tissues.    CT-HEAD W/O    Result Date: 2/28/2023 2/28/2023 12:13 PM HISTORY/REASON FOR EXAM:  Mental status change, unknown cause. TECHNIQUE/EXAM DESCRIPTION AND NUMBER OF VIEWS: CT of the head without contrast. The study was performed on a helical multidetector CT scanner. Contiguous axial sections were obtained from the skull base through the vertex. Up to date radiation dose reduction adjustments have been utilized to meet ALARA standards for radiation dose reduction. COMPARISON:  Head CT 6/28/2010 FINDINGS: The calvariae and skull base are unremarkable. There are no extraaxial fluid collections. The ventricular system and basal  cisterns are within normal limits. There are no areas of abnormal density in the brain substance. There are no hemorrhagic lesions.  There are no mass effects or shift of midline structures. The brainstem and posterior fossa structures are unremarkable. Paranasal sinuses in the field of view are unremarkable. Mastoids in the field of view are unremarkable.     Head CT without contrast within normal limits. No evidence of acute cerebral infarction, hemorrhage or mass lesion.     DX-CHEST-PORTABLE (1 VIEW)    Result Date: 2/28/2023 2/28/2023 11:11 AM HISTORY/REASON FOR EXAM:  possible sepsis.. TECHNIQUE/EXAM DESCRIPTION AND NUMBER OF VIEWS: Single portable view of the chest. COMPARISON: Chest x-ray 3/12/2021 FINDINGS: Heart size is within normal limits. No pulmonary infiltrates or consolidations are noted. No pleural abnormalities are noted.     No acute cardiac or pulmonary abnormalities are identified.    DX-FOOT-2- LEFT    Result Date: 2/28/2023 2/28/2023 1:44 PM HISTORY/REASON FOR EXAM:  Atraumatic Pain/Swelling/Deformity TECHNIQUE/EXAM DESCRIPTION AND NUMBER OF VIEWS: 2 views of the LEFT foot. COMPARISON:  MRI of the ankle 1/28/2023. Foot radiographs 1/21/2023 FINDINGS: Osteopenia is again present, decreasing sensitivity of the study for detection of acute displaced fracture. There is flat foot deformity as seen previously. No acute displaced fracture is noted. Mild solid periosteal reaction seen along the second third and fourth metatarsal shafts is new on the second and third. There is no subluxation. There is probable prominent plaque-like ulceration of the heel.     Severe osteopenia and flat foot deformity without evidence of acute displaced fracture No definite radiographic evidence of osteomyelitis.    DX-TIBIA AND FIBULA LEFT    Result Date: 2/28/2023 2/28/2023 2:25 PM HISTORY/REASON FOR EXAM:  Atraumatic Pain/Swelling/Deformity. TECHNIQUE/EXAM DESCRIPTION AND NUMBER OF VIEWS:  2 views of the LEFT  tibia and fibula. COMPARISON: MRI ankle 2023 FINDINGS: There is generalized osteopenia. No acute or aggressive osseous abnormality. No dislocation. There is diffuse soft tissue swelling of the leg, edema and/or cellulitis.     1.  Diffuse soft tissue swelling, edema and/or cellulitis. 2.  No acute osseous abnormality. 3.  Osteopenia.    US-EXTREMITY VENOUS LOWER UNILAT LEFT    Result Date: 2023   Vascular Laboratory  CONCLUSIONS  No deep venous thrombosis in the common femoral, profunda femoral, femoral,  popliteal, and posterior tibial veins.  The peroneal veins could not be adequately evaluated to exclude thrombosis.  LESLIE BADILLO  Exam Date:     2023 14:21  Room #:     Inpatient  Priority:     Stat  Ht (in):             Wt (lb):  Ordering Physician:        PUNEET KEARNEY  Referring Physician:       [id number] Peralta  Sonographer:               Delfina Gregory RVBANDAR  Study Type:                Complete Unilateral  Technical Quality:         Adequate  Age:    47    Gender:     M  MRN:    [id number]  :    1975      BSA:  Indications:     Localized swelling, mass and lump, left lower limb  CPT Codes:       20330  ICD Codes:       R22.42  History:         Swelling/edema and redness of the left calf with ulcer of                   left foot.  Limitations:  PROCEDURES:  Left lower extremity venous duplex imaging.  The following venous structures were evaluated: common femoral, deep  femoral, proximal great saphenous, femoral, popliteal, peroneal, and  posterior tibial veins.  Serial compression, color, and spectral Doppler flow evaluations were  performed.  FINDINGS:  Left lower extremity.  The posterior tibial veins are difficult to assess for compressibility, but  flow response to augmentation is demonstrated.  The peroneal veins are difficult to assess for compressibility and flow  response to augmentation.  All other veins demonstrate complete color filling and compressibility with  normal venous  "flow dynamics including spontaneous flow and respiratory  phasicity.  No deep venous thrombosis in the common femoral, profunda femoral, femoral,  popliteal, and posterior tibial veins.  Cannot rule out deep venous thrombosis in the peroneal veins.  Flow was evaluated in the contralateral common femoral vein and normal  venous flow dynamics including spontaneous flow and respiratory phasic  variation were demonstrated.    Nas Beasley  (Electronically Signed)  Final Date:      28 February 2023                   15:13      Micro:  Results       Procedure Component Value Units Date/Time    Blood Culture [[id number]] Collected: 02/28/23 2011    Order Status: Completed Specimen: Blood from Peripheral Updated: 03/05/23 2300     Significant Indicator NEG     Source BLD     Site PERIPHERAL     Culture Result No growth after 5 days of incubation.    Narrative:      From different peripheral sites, if not done within the last  24 hours (Per Hospital Policy: Only change specimen source to  \"Line\" if specified by physician order)  Left Forearm/Arm    Blood Culture [[id number]] Collected: 02/28/23 2146    Order Status: Completed Specimen: Blood from Peripheral Updated: 03/05/23 2300     Significant Indicator NEG     Source BLD     Site PERIPHERAL     Culture Result No growth after 5 days of incubation.    Narrative:      From different peripheral sites, if not done within the last  24 hours (Per Hospital Policy: Only change specimen source to  \"Line\" if specified by physician order)  Right Forearm/Arm            Assessment:  Richard Hubbard II is a 47 y.o. male patient with rheumatoid arthritis with rheumatoid nodules on Humira, history of planovalgus deformities and chronic bilateral plantar foot ulcers since early 2022.  Patient was being evaluated at the [facility], plan was for outpatient MRI but this was delayed, eventually obtained on 1/28/2023, found to have marrow edema involving the navicular,  cuboid, first through " third cuneiforms, 1st-3rd proximal metatarsals, concerning for osteomyelitis.  He was advised reconstructive surgeries but this has been delayed due to social issues.  About a week ago, patient began to experience worsening redness and swelling of the left lower extremity.  He presented to the ER.  Tmax is 101, no leukocytosis.  Blood cultures x2 from 2/28 positive for group A Strep.  Blood cultures from later that same day negative thus far.  CT scan with no obvious abscess. Orthopedics outpatient, no plans for surgery while inpatient.     Pertinent Diagnoses:  Leukocytosis  Group A Strep bacteremia  Left leg tenosynovitis  Left leg cellulitis  Left foot chronic osteomyelitis  Left-sided tinea cruris  Rheumatoid arthritis  Chronic foot deformities, chronic bilateral foot ulcers  Immunosuppressed status    Plan:  -Continue IV Ancef 2 g every 8 hours  -Repeat blood cultures x2 from 2/28 negative  -Patient underwent left BKA on 3/9, thereby undergoing surgical source control of the foot osteomyelitis and the leg tenosynovitis. Recommend completing antibiotics for 7 days to treat residual cellulitis through 3/15/2023.  An oral option is p.o. amoxicillin 1 g 3 times daily or p.o. Augmentin 875 mg twice daily     Disposition: Anticipate no ID specific disposition needs  Need for PICC line: No     Plan was discussed with Dr. Moseley.  ID will sign off

## 2023-03-10 NOTE — PROGRESS NOTES
POD#12  S/P BKA  Knee immobilizer to prevent flexion contracture  [employer] involved in counseling  Pain control  Case coordination

## 2023-03-10 NOTE — ANESTHESIA TIME REPORT
Anesthesia Start and Stop Event Times     Date Time Event    3/9/2023 1542 Ready for Procedure     1613 Anesthesia Start     1713 Anesthesia Stop        Responsible Staff  03/09/23    Name Role Begin End    Eliezer Otto M.D. Anesth 1613 1713        Overtime Reason:  overtime with call-back    Comments:

## 2023-03-10 NOTE — CARE PLAN
The patient is Stable - Low risk of patient condition declining or worsening    Shift Goals  Clinical Goals: Pain control, monitor blood sugars  Patient Goals: Pain control, comfort  Family Goals: Not present    Progress made toward(s) clinical / shift goals:  Patient educated on the pain scale and to notify RN of pain. Patient verbalizes pain control using PRN oral and IV medications. Patient encouraged to notify RN if pain remains uncontrolled. Patient medicated per MAR.      Patient is not progressing towards the following goals:

## 2023-03-10 NOTE — DIETARY
Nutrition Services Brief Update:    Day 10 of admit.  Richard Hubbard II is a 47 y.o. male with admitting DX of Sepsis.    Current Diet: Consistent CHO< Boost Glucose Control TID    Problem: Nutritional:  Goal: Achieve adequate nutritional intake  Description: Patient will consume >50% of meals  Outcome: not progressing    Pt has been intermittently NPO and PO records are minimal. Pt also s/p BKA on 3/9. RD to monitor how pt progresses over the weekend.    RD following

## 2023-03-10 NOTE — ANESTHESIA PROCEDURE NOTES
Airway    Date/Time: 3/9/2023 4:22 PM  Performed by: Eliezer Otto M.D.  Authorized by: Eliezer Otto M.D.     Location:  OR  Urgency:  Elective  Difficult Airway: No    Indications for Airway Management:  Anesthesia      Spontaneous Ventilation: absent    Sedation Level:  Deep  Preoxygenated: Yes    Patient Position:  Sniffing  Mask Difficulty Assessment:  1 - vent by mask  Final Airway Type:  Supraglottic airway  Final Supraglottic Airway:  Standard LMA    SGA Size:  5  Number of Attempts at Approach:  1

## 2023-03-10 NOTE — PROGRESS NOTES
Hospital Medicine Daily Progress Note    Date of Service  3/10/2023    Chief Complaint  Richard Hubbard II is a 47 y.o. male admitted 2/28/2023 with cellulitis and sepsis    Hospital Course  Richard Hubbard II is a 47 y.o. male who presented 2/28/2023 with history of rheumatoid arthritis on Humira with history of planovalgus deformities and foot ulcers with recent MRI concerning for osteomyelitis of the left navicular bone presented to the emergency department for evaluation of confusion and fever of 103 associated with hypoglycemia and worsening redness of the lower extremities more so on the left lower extremity.  He has a history of chronic pain syndrome with narcotic dependence.  The patient is not diabetic and has not had any prior history of hypoglycemia.  According to the wife he has been in the past on prednisone for rheumatoid arthritis flares but has not taken any over the past month.  No headache no loss of consciousness.  Patient was noted to be hypotensive and hypoglycemic received dextrose infusion antibiotics and fluids resuscitation and admitted to IMCU.  Blood cultures grew group A strep    MRI performed of the left knee through foot yesterday with new findings of extended tenosynovitis     Interval Problem Update  Patient was seen and examined at bedside  Reported overall joint pain due to arthritis    S/p left BKA 3/9, Knee immobilizer to prevent flexion contracture    I discussed with ID, continue antibiotics to complete a 7 days course, okay to transition Ancef to Augmentin 875 mg twice daily    Sodium 127, continue NS x 1L, advised to limit free water intake    WBC 20> 17    PMR consulted    I have discussed this patient's plan of care and discharge plan at IDT rounds today with Case Management, Nursing, Nursing leadership, and other members of the IDT team.    Consultants/Specialty  critical care    Code Status  Full Code    Disposition  Patient is not medically cleared for  discharge.   Anticipate discharge to to home with close outpatient follow-up.  I have placed the appropriate orders for post-discharge needs.    Review of Systems  Review of Systems   Constitutional:  Positive for malaise/fatigue. Negative for fever.   Respiratory:  Negative for shortness of breath.    Cardiovascular:  Negative for chest pain, palpitations and orthopnea.   Genitourinary:  Negative for dysuria, frequency and urgency.   Musculoskeletal:  Positive for joint pain.   Skin:  Positive for rash.   All other systems reviewed and are negative.     Physical Exam  Temp:  [35.9 °C (96.7 °F)-37.4 °C (99.3 °F)] 36.7 °C (98.1 °F)  Pulse:  [] 70  Resp:  [16-20] 18  BP: ()/(54-77) 104/64  SpO2:  [92 %-100 %] 96 %    Physical Exam  Vitals and nursing note reviewed.   Constitutional:       Appearance: He is well-developed. He is not diaphoretic.   HENT:      Head: Normocephalic and atraumatic.      Mouth/Throat:      Pharynx: No oropharyngeal exudate.   Eyes:      General: No scleral icterus.        Right eye: No discharge.         Left eye: No discharge.      Pupils: Pupils are equal, round, and reactive to light.   Neck:      Vascular: No JVD.      Trachea: No tracheal deviation.   Cardiovascular:      Rate and Rhythm: Normal rate and regular rhythm.      Heart sounds: No murmur heard.    No friction rub. No gallop.   Pulmonary:      Effort: Pulmonary effort is normal. No respiratory distress.      Breath sounds: Normal breath sounds. No stridor. No wheezing.   Chest:      Chest wall: No tenderness.   Abdominal:      General: There is no distension.      Palpations: Abdomen is soft.      Tenderness: There is no abdominal tenderness. There is no rebound.      Hernia: A hernia (Soft and reducible) is present.   Musculoskeletal:         General: Swelling and tenderness present.      Cervical back: Neck supple.   Skin:     General: Skin is warm.      Findings: Erythema present.      Nails: There is no  clubbing.      Comments: Left scrotal perineal rash    Left lower extremity erythema improved   Neurological:      Mental Status: He is alert and oriented to person, place, and time.      Cranial Nerves: No cranial nerve deficit.      Motor: No weakness or abnormal muscle tone.   Psychiatric:         Behavior: Behavior normal.       Fluids    Intake/Output Summary (Last 24 hours) at 3/10/2023 1602  Last data filed at 3/10/2023 0713  Gross per 24 hour   Intake 1024 ml   Output 400 ml   Net 624 ml       Laboratory  Recent Labs     03/08/23  0528   WBC 17.6*   RBC 3.65*   HEMOGLOBIN 10.5*   HEMATOCRIT 31.3*   MCV 85.8   MCH 28.8   MCHC 33.5*   RDW 46.3   PLATELETCT 197   MPV 9.9     Recent Labs     03/08/23  0528 03/10/23  0431   SODIUM 124* 127*   POTASSIUM 4.6 4.7   CHLORIDE 96 101   CO2 22 18*   GLUCOSE 84 152*   BUN 9 12   CREATININE 0.49* 0.48*   CALCIUM 7.0* 7.1*                     Imaging  IR-US GUIDED PIV   Final Result    Ultrasound-guided PERIPHERAL IV INSERTION performed by    qualified nursing staff as above.      [id number]-WITH & W/O LEFT   Final Result      1.  No evidence of left tibial or fibular osteomyelitis or fracture.      2.  Diffuse cellulitis      3.  Atrophy of the muscles of the lower leg with edema and patchy enhancement consistent with myositis. This involves the tibialis posterior muscle to the greatest degree.      4.  Some fluid seen superficial to the medial head of the gastrocnemius muscle. There is no evidence of rim enhancement to suggest presence of abscess.      MR-FOOT-WITH & W/O LEFT   Final Result      1.  Again seen marrow edema involving the medial aspect of the navicular and the adjacent first cuneiform with some enhancement of that area with overlying soft tissue ulceration and some fluid within the overlying soft tissues consistent with    osteomyelitis versus reactive change. This is unchanged from comparison.      2.  Marrow edema with small nondisplaced trabecular  fracture involving the first metatarsal head with enhancement of that area. Differential diagnosis includes nondisplaced stress/insufficiency fracture versus osteomyelitis.      3.  Again seen marrow edema involving the base of the second metatarsal with some abnormal enhancement with a small possible linear nondisplaced trabecular fracture extending through that area likely representing a small nondisplaced insufficiency    fracture. There is also arthropathy of that joint.      4.  Again seen marrow edema involving the cuneiforms and the adjacent metatarsals likely representing osteoarthritis.      5.  Osteoarthritis of the tibiotalar joint with chronic osteochondral injury of the lateral aspect of the talar dome. There is also osteoarthritis of the subtalar, talonavicular and calcaneocuboid articulations.      6.  Cellulitis involving the hindfoot most prominently medially.      7.  Prominent tenosynovitis of the tibialis posterior tendon and to a lesser degree the flexor digitorum longus and flexor hallucis tendons. This underlies the area of soft tissue ulceration and could potentially represent infectious tenosynovitis. This    is new from comparison.      8.  Pes planus and valgus deformity of the hindfoot.      CT-EXTREMITY, LOWER WITH LEFT   Final Result      1.  No evidence of acute osteomyelitis of the left lower extremity.      2.  No evidence of deep abscess formation in the subcutaneous tissues.      US-EXTREMITY VENOUS LOWER UNILAT LEFT   Final Result      DX-TIBIA AND FIBULA LEFT   Final Result      1.  Diffuse soft tissue swelling, edema and/or cellulitis.   2.  No acute osseous abnormality.   3.  Osteopenia.      DX-FOOT-2- LEFT   Final Result      Severe osteopenia and flat foot deformity without evidence of acute displaced fracture      No definite radiographic evidence of osteomyelitis.      CT-HEAD W/O   Final Result      Head CT without contrast within normal limits. No evidence of acute  cerebral infarction, hemorrhage or mass lesion.         DX-CHEST-PORTABLE (1 VIEW)   Final Result      No acute cardiac or pulmonary abnormalities are identified.             Assessment/Plan  * Sepsis (HCC)- (present on admission)  Assessment & Plan  Sepsis secondary to LLE tenosynovitis, group A strep  bacteremia  Continue cefazolin cefazolin  Duplex negative for DVT x-rays negative for acute bony pathology  Patient evaluated by orthopedics with nonsurgical management recommended and outpatient follow-up with Dr. Pryor  Continue wound care  Evaluated by ID    3/9 worsening sepsis due to   Plan for BKA for source control    Bacteremia  Assessment & Plan  Group A strep bacteremia  Repeated culture 2/28 negative    ID following    Continue Ancef    Immunocompromised state due to drug therapy (HCC)  Assessment & Plan  Has been maintained on Humira and intermittently on prednisone     Tenosynovitis of left lower leg  Assessment & Plan  MRI showed tenosynovitis of the tibialis posterior tendon, flexor digitorum longus and hallucis,     S/p left BKA 3/9,  Knee immobilizer to prevent flexion contracture    I discussed with ID, continue antibiotics to complete a 7 days course, okay to transition Ancef to Augmentin 875 mg twice daily    Yeast dermatitis  Assessment & Plan  Started on Diflucan and topical clotrimazole  Skin care    Electrolyte abnormality  Assessment & Plan  Hypokalemia  Hypomagnesemia  Hypophosphatemia  Hypocalcemia    Likely component of refeeding syndrome patient started on thiamine supplements    Replete with K-Phos and IV magnesium sulfate   Start calcium citrate supplements   Monitor electrolytes    Hyponatremia  Assessment & Plan  3/10 Sodium 127, NS x 1L, advised to limit free water intake  monitor      Thrombocytopenia (HCC)  Assessment & Plan  Platelet counts 85K likely reactive secondary to sepsis  Monitor CBC        Hypoglycemia  Assessment & Plan  Likely secondary to poor  intake  Hypoglycemia protocol  Continue D5 LR infusion overnight and if p.o. intake remains stable will DC in a.m.  Monitor CBGs    Ulcer of both feet (HCC)- (present on admission)  Assessment & Plan  Wound care  CT negative for abscess  MRI concerning for osteomyelitis patient evaluated by LPS continue wound care  Continue Ancef  Discussed with ID Dr. Galdamez recommends transitioning to p.o. Zyvox on discharge through 3/9/2023 followed by p.o. amoxicillin 1 g twice daily for 3 to 4 weeks and outpatient follow-up with ID and orthopedics  MRI performed of the left knee through foot yesterday with new findings of tenosynovitis which may be the explanation for his persistent elevated temperatures and mild leukocytosis.  ID following and will cont on IV abx for now     3/10 S/p left BKA 3/9 Dr Chambers    Opioid dependence ([facility])- (present on admission)  Assessment & Plan  With narcotic dependence    Patient reported that he uses contin 30mg bid and morphine 30 mg every 6 hours. However, I confirmed with pharmacy, it appears that his home dose is contin 15mg bid and morphine 15 mg every 6 hours    He has been following with Pain Dr.    The dose were increased to contin 30mg bid and morphine 30 mg every 6 hours per his request and given his acute issuses    History of immunosuppressive therapy- (present on admission)  Assessment & Plan  Home biweekly Humira and intermittent steroids        Rheumatoid arthritis (HCC)- (present on admission)  Assessment & Plan  Has been maintained on Humira and intermittently on prednisone although no recent steroid use  Continue to hold Humira  Cortisol level 45    tapered off hydrocortisone    History of bleeding ulcers  Assessment & Plan  Continue prilosec  Patient declines pharmacologic DVT prophylaxis discussed RBA       Please note that this dictation was created using voice recognition software. I have made every reasonable attempt to correct obvious errors, but I expect that  there are errors of grammar and possibly context that I did not discover before finalizing the note.    VTE prophylaxis: SCDs/TEDsNote patient reports history of severe gastrointestinal bleed with any anticoagulant per patient request no anticoagulant for DVT prophylaxis ordere  I have performed a physical exam and reviewed and updated ROS and Plan today (3/10/2023). In review of yesterday's note (3/9/2023), there are no changes except as documented above.         Declined

## 2023-03-10 NOTE — OP REPORT
DATE OF OPERATION: 3/9/2023     PREOPERATIVE DIAGNOSIS: Nonviable left leg    POSTOPERATIVE DIAGNOSIS: Same    PROCEDURE PERFORMED:  Left below knee amputation    SURGEON: Wayne Chambers M.D.     ASSISTANT: Brooke Adair PA-C    ANESTHESIOLOGIST: Eliezer Otto MD    ANESTHESIA: General    ESTIMATED BLOOD LOSS: 25 mL    INDICATIONS: The patient is a 47 y.o. male with a necrotic left leg.  The leg was deemed to be nonviable by the Limb Preservation Service due to vascular status, poor soft tissue and chronic infection.  Given these findings, below knee amputation was indicated.  I discussed the risks and benefits of the procedure, including the risks of infection, wound healing complication, neurovascular injury, need for more proximal amputation, and the medical risks of anesthesia including DVT, PE, MI, stroke, and death.  Benefits include early mobilization and hopeful avoidence of sepsis and death.  Alternatives to surgery were also discussed, including non-operative management.  The patient signed the informed consent and the operative extremity was marked.      PROCEDURE:  The patient underwent anesthesia, and was positioned supine and all bony prominences were well padded.  Preoperative antibiotics were administered. Sequential compression devices were employed. The correct operative site was prepped and draped into a sterile field. A procedural pause was conducted to verify correct patient, correct extremity, presence of the surgeons initials on the operative extremity.    A well padded tourniquet was inflated to 250mmHg and a below knee amputation was performed 15cm below the tibial tubercle with a long posterior flap. Tibia was cut with a saw and beveled. Fibular cut was made 1cm proximal to tibia cut. Bones were smoothed with a rasp. All neurovascular structures were identified. Nerves were placed under tension and cut sharply to avoid neuroma formation. All vascular structures were tied off with  suture. Tourniquet was deflated to ensure no active bleeding.    All wounds were irrigated with normal saline. Posterior fascia was closed to anterior fascia with #1 vicryl suture. Wound was closed in layers, with 2-0 Vicryl in the subcutaneous tissue, and 2-0 nylon for the skin.  Sterile dressings were applied.  Knee immobilizer was applied to prevent flexion contracture.    The patient tolerated the procedure well. There were no apparent complications. All sponge, needle, and instrument counts were correct on two separate occasions. He was awakened, extubated, and transferred to the recovery room in satisfactory condition.     The use of Brooke Adair as a surgical assistant was necessary for assistance with exposure, retraction, fracture reduction, instrumentation, and closure.    Post-Operative Plan:    1.  The patient should be non weightbearing on their operative extremity.  Gait aids (crutch or crutches, cane, walker) may be used as needed.  2.  IV antibiotics - may be continued for 24 hours, and longer if deemed appropriate by infectious disease service.  3.  DVT prophylaxis - SCD's and Lovenox 40 mg SQ daily while inpatient.  The patient may transition to Aspirin 325 mg PO BID as an outpatient  4.  Discharge planning  5. Sutures should remain in place 4-6 weeks  6. [employer] [facility]  ____________________________________   Wayne Chambers M.D.   DD: 3/9/2023  5:07 PM

## 2023-03-10 NOTE — ANESTHESIA POSTPROCEDURE EVALUATION
Patient: Richard Hubbard II    Procedure Summary     Date: 03/09/23 Room / Location: [place] [address] / SURGERY [facility]    Anesthesia Start: 1613 Anesthesia Stop: 1713    Procedure: AMPUTATION, BELOW KNEE (Left: Leg Lower) Diagnosis: (osteomyelitis of left leg)    Surgeons: Wayne Chambers M.D. Responsible Provider: Eliezer Otto M.D.    Anesthesia Type: general ASA Status: 2          Final Anesthesia Type: general  Last vitals  BP   Blood Pressure: 102/70    Temp   37.4 °C (99.3 °F)    Pulse   88   Resp   20    SpO2   100 %      Anesthesia Post Evaluation    Patient location during evaluation: PACU  Patient participation: complete - patient participated  Level of consciousness: awake and alert  Pain score: 4    Airway patency: patent  Anesthetic complications: no  Cardiovascular status: hemodynamically stable  Respiratory status: acceptable  Hydration status: euvolemic    PONV: none          There were no known notable events for this encounter.     Nurse Pain Score: 8 (NPRS)

## 2023-03-10 NOTE — OR NURSING
1709- Pt arrives to PACU from OR on 6L of oxygen via mask. Report received. Dressing to L leg CDI. Pt with 10/10 pain, medicated per MAR, blood sugar 63, orders received.     1739- Pt wife Katiana called and updated on POC and pt status.     1759- Dr. Babb at bedside to complete block.    1829- FSBS recheck 92, report called to Ghazal CUEVAS. Pt visibly more comfortable after nerve block.

## 2023-03-10 NOTE — ANESTHESIA PROCEDURE NOTES
Peripheral Block    Date/Time: 3/9/2023 5:41 PM  Performed by: Laith Babb M.D.  Authorized by: Eliezer Otto M.D.     Patient Location:  Post-op  Start Time:  3/9/2023 5:41 PM  End Time:  3/9/2023 5:55 PM  Reason for Block: at surgeon's request and post-op pain management ONLY    patient identified, IV checked, site marked, risks and benefits discussed, surgical consent, monitors and equipment checked, pre-op evaluation and timeout performed    Patient Position:  Supine  Prep: ChloraPrep    Monitoring:  Heart rate, continuous pulse ox and cardiac monitor  Block Region:  Lower Extremity  Lower Extremity - Block Type:  FEMORAL nerve block, Infra-Inguinal approach    Laterality:  Left  Procedures: ultrasound guided  Image captured, interpreted and electronically stored.  Local Infiltration:  Lidocaine  Strength:  1 %  Dose:  3 ml  Block Type:  Single-shot  Needle Length:  100mm  Needle Gauge:  21 G  Needle Localization:  Ultrasound guidance  Injection Assessment:  Negative aspiration for heme, no paresthesia on injection, incremental injection and local visualized surrounding nerve on ultrasound  Evidence of intravascular injection: No

## 2023-03-10 NOTE — PROGRESS NOTES
4 Eyes Skin Assessment Completed by FAITH Heath and FAITH Stauffer.    Head WDL  Ears WDL  Nose WDL  Mouth scab  Neck WDL  Breast/Chest WDL  Shoulder Blades WDL  Spine rash  (R) Arm/Elbow/Hand Redness, Blanching, and Scab  (L) Arm/Elbow/Hand Redness, Blanching, and Scab  Abdomen Hernia  Groin Redness, Blanching, and Excoriation  Scrotum/Coccyx/Buttocks Redness, Blanching, Excoriation, and Moisture Fissure  (R) Leg Discoloration, scattered scabs  (L) Leg L BKA sx dressing CDI  (R) Heel/Foot/Toe Redness and Blanching RA Deformity, wound with dressing  (L) Heel/Foot/Toe N/A BKA    Patient was initially on a STEPHANIE bed but requested to be transferred to a regular bed d/t being uncomfortable and pain.      Devices In Places Blood Pressure Cuff, Pulse Ox, and SCD's      Interventions In Place Waffle Overlay, Pillows, Barrier Cream, Heels Loaded W/Pillows, and Pressure Redistribution Mattress    Possible Skin Injury Yes    Pictures Uploaded Into Epic Yes  Wound Consult Placed N/A wound following  RN Wound Prevention Protocol Ordered Yes

## 2023-03-10 NOTE — ADDENDUM NOTE
Addendum  created 03/09/23 2235 by Laith Babb M.D.    Child order released for a procedure order, Clinical Note Signed, Intraprocedure Blocks edited, Intraprocedure Staff edited, SmartForm saved

## 2023-03-10 NOTE — DISCHARGE PLANNING
Case Management Discharge Planning    Admission Date: 2/28/2023  GMLOS: 5  ALOS: 10    6-Clicks ADL Score: 14  6-Clicks Mobility Score: 7  PT and/or OT Eval ordered: Yes  Post-acute Referrals Ordered: Yes  Post-acute Choice Obtained: Yes  Has referral(s) been sent to post-acute provider:  Yes      Anticipated Discharge Dispo: Discharge Disposition: Disch to IP rehab facility or distinct part unit (62)    DME Needed: No    Action(s) Taken: MDR discussion, chart review.  CM met with patient, his wife and mother at bedside, IP rehab choices discussed, [facility] as preferred choice.   Patient and wife asking to talk to ortho/LPS regarding amputation, they have many questions. Ortho PA and LPS NP notified.     Escalations Completed: Provider    Medically Clear: No    Next Steps: Medical clearance    Barriers to Discharge: Medical clearance    Is the patient up for discharge tomorrow: No

## 2023-03-10 NOTE — PROGRESS NOTES
Patient refusing strip bed alarm. Patient educated to call for assistance to get out of bed. Patient verbalized understanding.

## 2023-03-11 PROBLEM — E83.51 HYPOCALCEMIA: Status: ACTIVE | Noted: 2023-01-01

## 2023-03-11 NOTE — PROGRESS NOTES
"Agree, TC       Orthopaedic Progress Note    Interval changes:  Patient seen per request of Dr Ha  Pain control issues- toradol 30mg IV Q8 prn, and gabapentin 300mg po TID added   Dressing CDI  Discussed need to get leg fully straight several times a day to prevent contracture    ROS - Patient denies any new issues.  Pain well controlled.    /66   Pulse (!) 59   Temp 36.6 °C (97.9 °F) (Temporal)   Resp 18   Ht 1.575 m (5' 2.01\")   Wt 97.3 kg (214 lb 8.1 oz)   SpO2 95%       Patient seen and examined  No acute distress  Breathing non labored  RRR  LLE dressing CDI, no contracture noted, pillow behind knee at start of exam, pillow removed and knee able to fully go straight.      Recent Labs     03/11/23  0426   WBC 11.2*   RBC 3.02*   HEMOGLOBIN 8.8*   HEMATOCRIT 27.3*   MCV 90.4   MCH 29.1   MCHC 32.2*   RDW 48.8   PLATELETCT 289   MPV 9.6       Active Hospital Problems    Diagnosis     Paroxysmal atrial fibrillation (HCC) [I48.0]      Priority: Low    H/o  essential hypertension [I10]      Priority: Low    Rheumatoid arthritis (HCC) [M06.9]      Priority: Low    Hypocalcemia [E83.51]     Bacteremia [R78.81]     Immunocompromised state due to drug therapy ([facility]) [D84.821, Z79.899]     Tenosynovitis of left lower leg [M65.9]     Yeast dermatitis [B37.2]     Electrolyte abnormality [E87.8]     Sepsis (HCC) [A41.9]     Hypoglycemia [E16.2]     Thrombocytopenia (HCC) [D69.6]     Hyponatremia [E87.1]     Ulcer of both feet ([facility]) [L97.519, L97.529]     Opioid dependence ([facility]) [F11.20]     History of immunosuppressive therapy [Z92.25]     History of osteomyelitis [Z87.39]     S/P total knee arthroplasty, right [Z96.651]     Chronic, continuous use of opioids [F11.90]     History of bleeding ulcers [Z87.11]     MONICA (obstructive sleep apnea) [G47.33]      AHI 22, minimum saturation 77%, on CPAP 10 cm         Assessment/Plan:  Patient seen per request of Dr Ha  Pain control issues- toradol 30mg IV " Q8 prn, and gabapentin 300mg po TID added   Dressing CDI  Discussed need to get leg fully straight several times a day to prevent contracture  POD#2 S/P Left below knee amputation  Wt bearing status - NWB LLE  Wound care/Drains - Dressings to be changed every other day by nursing  Future Procedures - none planned   Lovenox: Start ok per ortho, Duration-until ambulatory > 150'  Sutures/Staples out- 14-21 days post operatively  PT/OT-initiated  Antibiotics: ancef 2g IV Q8  DVT Prophylaxis- TEDS/SCDs/Foot pumps  [place]-none  Case Coordination for Discharge Planning - Disposition home

## 2023-03-11 NOTE — PROGRESS NOTES
Hospital Medicine Daily Progress Note    Date of Service  3/11/2023    Chief Complaint  Richard Hubbard II is a 47 y.o. male admitted 2/28/2023 with cellulitis and sepsis    Hospital Course  Richard Hubbard II is a 47 y.o. male who presented 2/28/2023 with history of rheumatoid arthritis on Humira with history of planovalgus deformities and foot ulcers with recent MRI concerning for osteomyelitis of the left navicular bone presented to the emergency department for evaluation of confusion and fever of 103 associated with hypoglycemia and worsening redness of the lower extremities more so on the left lower extremity.  He has a history of chronic pain syndrome with narcotic dependence.  The patient is not diabetic and has not had any prior history of hypoglycemia.  According to the wife he has been in the past on prednisone for rheumatoid arthritis flares but has not taken any over the past month.  No headache no loss of consciousness.  Patient was noted to be hypotensive and hypoglycemic received dextrose infusion antibiotics and fluids resuscitation and admitted to IMCU.  Blood cultures grew group A strep    MRI performed of the left knee through foot yesterday with new findings of extended tenosynovitis     Interval Problem Update  Patient was seen and examined at bedside  Reported overall joint pain due to arthritis    Patient reported severe pain of left BKA site, not controlled by current pain meds.  Ordered 1 dose of IV Dilaudid    S/p left BKA 3/9, Knee immobilizer to prevent flexion contracture    I discussed with ID, continue antibiotics to complete a 7 days course, okay to transition Ancef to Augmentin 875 mg twice daily    Sodium 127> 130  WBC 20> 17>11  Ionized calcium 1.1    PMR consulted    I have discussed this patient's plan of care and discharge plan at IDT rounds today with Case Management, Nursing, Nursing leadership, and other members of the IDT team.    Consultants/Specialty  critical  care    Code Status  Full Code    Disposition  Patient is not medically cleared for discharge.   Anticipate discharge to to home with close outpatient follow-up.  I have placed the appropriate orders for post-discharge needs.    Review of Systems  Review of Systems   Constitutional:  Positive for malaise/fatigue. Negative for fever.   Respiratory:  Negative for shortness of breath.    Cardiovascular:  Negative for chest pain, palpitations and orthopnea.   Genitourinary:  Negative for dysuria, frequency and urgency.   Musculoskeletal:  Positive for joint pain.   Skin:  Positive for rash.   All other systems reviewed and are negative.     Physical Exam  Temp:  [36.1 °C (97 °F)-36.7 °C (98.1 °F)] 36.6 °C (97.9 °F)  Pulse:  [59-75] 59  Resp:  [18] 18  BP: (102-110)/(62-68) 106/66  SpO2:  [94 %-99 %] 95 %    Physical Exam  Vitals and nursing note reviewed.   Constitutional:       Appearance: He is well-developed. He is not diaphoretic.   HENT:      Head: Normocephalic and atraumatic.      Mouth/Throat:      Pharynx: No oropharyngeal exudate.   Eyes:      General: No scleral icterus.        Right eye: No discharge.         Left eye: No discharge.      Pupils: Pupils are equal, round, and reactive to light.   Neck:      Vascular: No JVD.      Trachea: No tracheal deviation.   Cardiovascular:      Rate and Rhythm: Normal rate and regular rhythm.      Heart sounds: No murmur heard.    No friction rub. No gallop.   Pulmonary:      Effort: Pulmonary effort is normal. No respiratory distress.      Breath sounds: Normal breath sounds. No stridor. No wheezing.   Chest:      Chest wall: No tenderness.   Abdominal:      General: There is no distension.      Palpations: Abdomen is soft.      Tenderness: There is no abdominal tenderness. There is no rebound.      Hernia: A hernia (Soft and reducible) is present.   Musculoskeletal:         General: Swelling and tenderness present.      Cervical back: Neck supple.   Skin:     General:  Skin is warm.      Findings: Erythema present.      Nails: There is no clubbing.      Comments: Left scrotal perineal rash    Left lower extremity erythema improved   Neurological:      Mental Status: He is alert and oriented to person, place, and time.      Cranial Nerves: No cranial nerve deficit.      Motor: No weakness or abnormal muscle tone.   Psychiatric:         Behavior: Behavior normal.       Fluids    Intake/Output Summary (Last 24 hours) at 3/11/2023 1401  Last data filed at 3/11/2023 1200  Gross per 24 hour   Intake --   Output 1525 ml   Net -1525 ml       Laboratory  Recent Labs     03/11/23  0426   WBC 11.2*   RBC 3.02*   HEMOGLOBIN 8.8*   HEMATOCRIT 27.3*   MCV 90.4   MCH 29.1   MCHC 32.2*   RDW 48.8   PLATELETCT 289   MPV 9.6     Recent Labs     03/10/23  0431 03/11/23  0426   SODIUM 127* 130*   POTASSIUM 4.7 4.6   CHLORIDE 101 103   CO2 18* 20   GLUCOSE 152* 85   BUN 12 10   CREATININE 0.48* 0.42*   CALCIUM 7.1* 6.9*                     Imaging  IR-US GUIDED PIV   Final Result    Ultrasound-guided PERIPHERAL IV INSERTION performed by    qualified nursing staff as above.      [id number]-WITH & W/O LEFT   Final Result      1.  No evidence of left tibial or fibular osteomyelitis or fracture.      2.  Diffuse cellulitis      3.  Atrophy of the muscles of the lower leg with edema and patchy enhancement consistent with myositis. This involves the tibialis posterior muscle to the greatest degree.      4.  Some fluid seen superficial to the medial head of the gastrocnemius muscle. There is no evidence of rim enhancement to suggest presence of abscess.      MR-FOOT-WITH & W/O LEFT   Final Result      1.  Again seen marrow edema involving the medial aspect of the navicular and the adjacent first cuneiform with some enhancement of that area with overlying soft tissue ulceration and some fluid within the overlying soft tissues consistent with    osteomyelitis versus reactive change. This is unchanged from  comparison.      2.  Marrow edema with small nondisplaced trabecular fracture involving the first metatarsal head with enhancement of that area. Differential diagnosis includes nondisplaced stress/insufficiency fracture versus osteomyelitis.      3.  Again seen marrow edema involving the base of the second metatarsal with some abnormal enhancement with a small possible linear nondisplaced trabecular fracture extending through that area likely representing a small nondisplaced insufficiency    fracture. There is also arthropathy of that joint.      4.  Again seen marrow edema involving the cuneiforms and the adjacent metatarsals likely representing osteoarthritis.      5.  Osteoarthritis of the tibiotalar joint with chronic osteochondral injury of the lateral aspect of the talar dome. There is also osteoarthritis of the subtalar, talonavicular and calcaneocuboid articulations.      6.  Cellulitis involving the hindfoot most prominently medially.      7.  Prominent tenosynovitis of the tibialis posterior tendon and to a lesser degree the flexor digitorum longus and flexor hallucis tendons. This underlies the area of soft tissue ulceration and could potentially represent infectious tenosynovitis. This    is new from comparison.      8.  Pes planus and valgus deformity of the hindfoot.      CT-EXTREMITY, LOWER WITH LEFT   Final Result      1.  No evidence of acute osteomyelitis of the left lower extremity.      2.  No evidence of deep abscess formation in the subcutaneous tissues.      US-EXTREMITY VENOUS LOWER UNILAT LEFT   Final Result      DX-TIBIA AND FIBULA LEFT   Final Result      1.  Diffuse soft tissue swelling, edema and/or cellulitis.   2.  No acute osseous abnormality.   3.  Osteopenia.      DX-FOOT-2- LEFT   Final Result      Severe osteopenia and flat foot deformity without evidence of acute displaced fracture      No definite radiographic evidence of osteomyelitis.      CT-HEAD W/O   Final Result      Head  CT without contrast within normal limits. No evidence of acute cerebral infarction, hemorrhage or mass lesion.         DX-CHEST-PORTABLE (1 VIEW)   Final Result      No acute cardiac or pulmonary abnormalities are identified.             Assessment/Plan  * Sepsis (HCC)- (present on admission)  Assessment & Plan  Sepsis secondary to LLE tenosynovitis, group A strep  bacteremia  Continue cefazolin cefazolin  Duplex negative for DVT x-rays negative for acute bony pathology  Patient evaluated by orthopedics with nonsurgical management recommended and outpatient follow-up with Dr. Pryor  Continue wound care  Evaluated by ID    3/9 worsening sepsis due to   Plan for BKA for source control    Bacteremia  Assessment & Plan  Group A strep bacteremia  Repeated culture 2/28 negative    ID following    Continue Ancef    Tenosynovitis of left lower leg  Assessment & Plan  MRI showed tenosynovitis of the tibialis posterior tendon, flexor digitorum longus and hallucis,     S/p left BKA 3/9,  Knee immobilizer to prevent flexion contracture    I discussed with ID, continue antibiotics to complete a 7 days course, okay to transition Ancef to Augmentin 875 mg twice daily    3/11 Patient reported severe pain of left BKA site, not controlled by current pain meds.  Ordered 1 dose of IV Dilaudid    Hypocalcemia  Assessment & Plan  On oral supplement    3/11 calcium 6.9, ionized calcium 1.1    Continue monitor    Immunocompromised state due to drug therapy (HCC)  Assessment & Plan  Has been maintained on Humira and intermittently on prednisone     Yeast dermatitis  Assessment & Plan  Started on Diflucan and topical clotrimazole  Skin care    Electrolyte abnormality  Assessment & Plan  Hypokalemia  Hypomagnesemia  Hypophosphatemia  Hypocalcemia    Likely component of refeeding syndrome patient started on thiamine supplements    Replete with K-Phos and IV magnesium sulfate   Start calcium citrate supplements   Monitor  electrolytes    Hyponatremia  Assessment & Plan  3/10 Sodium 127, NS x 1L, advised to limit free water intake  monitor      Thrombocytopenia ([facility])  Assessment & Plan  Platelet counts 85K likely reactive secondary to sepsis  Monitor CBC        Hypoglycemia  Assessment & Plan  Likely secondary to poor intake  Hypoglycemia protocol  Continue D5 LR infusion overnight and if p.o. intake remains stable will DC in a.m.  Monitor CBGs    Ulcer of both feet ([facility])- (present on admission)  Assessment & Plan  Wound care  CT negative for abscess  MRI concerning for osteomyelitis patient evaluated by LPS continue wound care  Continue Ancef  Discussed with ID Dr. Galdamez recommends transitioning to p.o. Zyvox on discharge through 3/9/2023 followed by p.o. amoxicillin 1 g twice daily for 3 to 4 weeks and outpatient follow-up with ID and orthopedics  MRI performed of the left knee through foot yesterday with new findings of tenosynovitis which may be the explanation for his persistent elevated temperatures and mild leukocytosis.  ID following and will cont on IV abx for now     3/10 S/p left BKA 3/9 Dr Chambers    Opioid dependence ([facility])- (present on admission)  Assessment & Plan  With narcotic dependence    Patient reported that he uses contin 30mg bid and morphine 30 mg every 6 hours. However, I confirmed with pharmacy, it appears that his home dose is contin 15mg bid and morphine 15 mg every 6 hours    He has been following with Pain .    The dose were increased to contin 30mg bid and morphine 30 mg every 6 hours per his request and given his acute issuses    History of immunosuppressive therapy- (present on admission)  Assessment & Plan  Home biweekly Humira and intermittent steroids        Rheumatoid arthritis ([facility])- (present on admission)  Assessment & Plan  Has been maintained on Humira and intermittently on prednisone although no recent steroid use  Continue to hold Humira  Cortisol level 45    tapered off  hydrocortisone    History of bleeding ulcers  Assessment & Plan  Continue prilosec  Patient declines pharmacologic DVT prophylaxis discussed RBA       Please note that this dictation was created using voice recognition software. I have made every reasonable attempt to correct obvious errors, but I expect that there are errors of grammar and possibly context that I did not discover before finalizing the note.    VTE prophylaxis: SCDs/TEDsNote patient reports history of severe gastrointestinal bleed with any anticoagulant per patient request no anticoagulant for DVT prophylaxis ordere  I have performed a physical exam and reviewed and updated ROS and Plan today (3/11/2023). In review of yesterday's note (3/10/2023), there are no changes except as documented above.

## 2023-03-11 NOTE — DISCHARGE PLANNING
Pending post-op OT eval, physiatry following. Will need to submit to insurance for authorization Monday 3/13.

## 2023-03-11 NOTE — CARE PLAN
The patient is Stable - Low risk of patient condition declining or worsening    Shift Goals  Clinical Goals: pain control; BG monitoring; mobility  Patient Goals: pain control; comfort  Family Goals: not present    Progress made toward(s) clinical / shift goals:    Problem: Pain - Standard  Goal: Alleviation of pain or a reduction in pain to the patient’s comfort goal  Outcome: Progressing     Problem: Fall Risk  Goal: Patient will remain free from falls  Outcome: Progressing     Problem: Knowledge Deficit - Standard  Goal: Patient and family/care givers will demonstrate understanding of plan of care, disease process/condition, diagnostic tests and medications  Outcome: Progressing     Problem: Self Care  Goal: Patient will have the ability to perform ADLs independently or with assistance (bathe, groom, dress, toilet and feed)  Outcome: Progressing     Problem: Mobility  Goal: Patient's capacity to carry out activities will improve  Outcome: Progressing       Patient is not progressing towards the following goals:

## 2023-03-11 NOTE — CARE PLAN
The patient is Stable - Low risk of patient condition declining or worsening    Shift Goals  Clinical Goals: Pain control, monitor blood sugar  Patient Goals: Pain control, comfort  Family Goals: Not present    Progress made toward(s) clinical / shift goals:  Patient educated on the pain scale and to notify RN of pain. Patient verbalizes pain control using PRN oral and IV abx. Patient encouraged to notify RN if pain remains uncontrolled. Patient medicated per MAR.      Patient is not progressing towards the following goals:

## 2023-03-11 NOTE — THERAPY
"Physical Therapy   Daily Treatment     Patient Name: Richard Hubbard II  Age:  47 y.o., Sex:  male  Medical Record #: [id number]  Today's Date: 3/10/2023     Precautions  Precautions: Fall Risk;Weight Bearing As Tolerated Right Lower Extremity;Non Weight Bearing Left Lower Extremity;Immobilizer Left Lower Extremity  Comments: s/p L BKA, immobilizer not in room    Assessment    Pt now s/p L BKA on 3/9. Pt educated on BKA positioning, desensitization, wrapping, and introduction to therex (will continue to provide further next visit). Pt mobilized as detailed below. Pt continues to present with weakness, pain, impaired balance, activity tolerance and endurance. Recommend placement and continue acute IP PT to address above deficits.     Encouraged and emphasized importance of pt mobilizing to EOB with RN staff multiple times daily.     Plan    Treatment Plan Status: Modify Current Treatment Plan  Type of Treatment: Bed Mobility, Equipment, Neuro Re-Education / Balance, Gait Training, Orthotics Training , Prosthetics Training, Self Care / Home Evaluation, Stair Training, Therapeutic Activities, Therapeutic Exercise  Treatment Frequency: 5 Times per Week  Treatment Duration: Until Therapy Goals Met    DC Equipment Recommendations: Unable to determine at this time  Discharge Recommendations: Recommend post-acute placement for additional physical therapy services prior to discharge home      Subjective    \"All my joints hurt.\"      Objective     03/10/23 1704   Vitals   O2 Delivery Device None - Room Air   Pain 0 - 10 Group   Therapist Pain Assessment Nurse Notified;During Activity  (\"all of my joints\" Not rated, agreeable to mobility)   Cognition    Cognition / Consciousness WDL   Level of Consciousness Alert   Comments Pleasant and cooperative   Passive ROM Lower Body   Passive ROM Lower Body X   Comments RLE tibial external rotation and limited ankle DF baseline, otherwise WDL   Active ROM Lower Body    Active ROM " Lower Body  X   Comments difficulty with LLE LAQ and bringing LLE to EOB, otherwise WDL   Strength Lower Body   Lower Body Strength  X   Comments R ankle DF 3/5 within available range, knee ext 3/5, hip flexion 2+/5. L knee ext 1+/5, hip flexion 1+/5, hip abd/add 2-/5. Poor L quad activation   Supine Lower Body Exercise   Comments educated on LAQ, L hip IR, and quad sets- mostly revolving around positioning.   Other Treatments   Other Treatments Provided Provided BKA handouts for positioning, wrapping and desensitization, pt receptive to edu. Emphasized no pillow under knee as pt found as such. Pt also noted to have skin break down between his legs- RN made aware. Also emphasized importance of daily OOB mobility, even to EOB for all meals as pt noted to have hospital aquired weakness, has not been mobilizing to EOB   Balance   Sitting Balance (Static) Fair   Sitting Balance (Dynamic) Fair -   Standing Balance (Static) Trace +   Standing Balance (Dynamic) Dependent   Weight Shift Sitting Poor   Weight Shift Standing Absent  (L BKA)   Skilled Intervention Verbal Cuing;Tactile Cuing;Sequencing;Compensatory Strategies   Comments via x2 person assist in standing   Bed Mobility    Supine to Sit Moderate Assist   Sit to Supine Moderate Assist   Scooting Moderate Assist  (Lisa at times for ant/posterior/lateral seated scooting. MaxA for supine scooting)   Skilled Intervention Verbal Cuing;Tactile Cuing;Sequencing;Compensatory Strategies   Gait Analysis   Gait Level Of Assist Unable to Participate   Weight Bearing Status LLE NWB, immobilizer at rest   Comments pt has preferred R shoe   Functional Mobility   Sit to Stand Maximal Assist  (x2 person)   Bed, Chair, Wheelchair Transfer Unable to Participate   Mobility STS x1 for < 1 min   Skilled Intervention Verbal Cuing;Tactile Cuing;Sequencing;Postural Facilitation;Compensatory Strategies   Comments limited standing tolerance 2/2 weakness, fatigue, pain c/o lightheadedness and  poor balance   How much difficulty does the patient currently have...   Turning over in bed (including adjusting bedclothes, sheets and blankets)? 1   Sitting down on and standing up from a chair with arms (e.g., wheelchair, bedside commode, etc.) 1   Moving from lying on back to sitting on the side of the bed? 1   How much help from another person does the patient currently need...   Moving to and from a bed to a chair (including a wheelchair)? 2   Need to walk in a hospital room? 1   Climbing 3-5 steps with a railing? 1   6 clicks Mobility Score 7   Activity Tolerance   Sitting in Chair NT   Sitting Edge of Bed 10 min   Standing < 1 min   Comments pain, weakness, fatigue   Short Term Goals    Short Term Goal # 1 Pt will perform supine <> sit without bed features with SPV in 6 visits to progress functional independence   Goal Outcome # 1 goal not met   Short Term Goal # 2 Pt will perform STS with FWW and min A in 6 visits to progress OOB mobility   Goal Outcome # 2 Goal not met   Short Term Goal # 3 Pt will perform slide board transfer with Lisa in 6 visits to improve functional independence   Short Term Goal # 4 Pt will self propel w/c 250ft with SPV in 6 visits to safely access community distances   Education Group   Education Provided Role of Physical Therapist  (BKA education)   Role of Physical Therapist Patient Response Patient;Family;Acceptance;Explanation;Action Demonstration   Physical Therapy Treatment Plan   Physical Therapy Treatment Plan Modify Current Treatment Plan   Treatment Plan  Bed Mobility;Equipment;Neuro Re-Education / Balance;Gait Training;Orthotics Training ;Prosthetics Training;Self Care / Home Evaluation;Stair Training;Therapeutic Activities;Therapeutic Exercise   Treatment Frequency 5 Times per Week   Duration Until Therapy Goals Met   Anticipated Discharge Equipment and Recommendations   DC Equipment Recommendations Unable to determine at this time   Discharge Recommendations Recommend  post-acute placement for additional physical therapy services prior to discharge home   Interdisciplinary Plan of Care Collaboration   IDT Collaboration with  Nursing;Family / Caregiver   Patient Position at End of Therapy In Bed;Bed Alarm On;Call Light within Reach;Tray Table within Reach;Phone within Reach;Family / Friend in Room   Collaboration Comments RN updated   Session Information   Date / Session Number  3/10- 3 (1/5, 3/16)

## 2023-03-11 NOTE — CARE PLAN
The patient is Stable - Low risk of patient condition declining or worsening    Shift Goals  Clinical Goals: pain mgmt  Patient Goals: comfort  Family Goals: not present    Progress made toward(s) clinical / shift goals:    Problem: Fall Risk  Goal: Patient will remain free from falls  Outcome: Progressing  Note: Safety precautions are in place including bed locked and in lowest position, upper bed rails up, bed alarm on, call light within reach, treaded socks on, tray table and personal belongings within reach.        Patient is not progressing towards the following goals:

## 2023-03-12 NOTE — PROGRESS NOTES
"Agree, TC          Orthopaedic Progress Note    Interval changes:  Pain control issues improved  Dressing CDI    ROS - Patient denies any new issues.  Pain well controlled.    /74   Pulse 87   Temp 36.6 °C (97.9 °F) (Temporal)   Resp 17   Ht 1.575 m (5' 2.01\")   Wt 97.3 kg (214 lb 8.1 oz)   SpO2 97%       Patient seen and examined  No acute distress  Breathing non labored  RRR  LLE dressing CDI, no contracture noted, pillow again behind knee at start of exam.    Recent Labs     03/11/23  0426 03/12/23  0136   WBC 11.2* 9.2   RBC 3.02* 3.14*   HEMOGLOBIN 8.8* 9.0*   HEMATOCRIT 27.3* 27.6*   MCV 90.4 87.9   MCH 29.1 28.7   MCHC 32.2* 32.6*   RDW 48.8 48.1   PLATELETCT 289 307   MPV 9.6 9.3       Active Hospital Problems    Diagnosis     Paroxysmal atrial fibrillation ([facility]) [I48.0]      Priority: Low    H/o  essential hypertension [I10]      Priority: Low    Rheumatoid arthritis (HCC) [M06.9]      Priority: Low    Hypocalcemia [E83.51]     Bacteremia [R78.81]     Immunocompromised state due to drug therapy ([facility]) [D84.821, Z79.899]     Tenosynovitis of left lower leg [M65.9]     Yeast dermatitis [B37.2]     Electrolyte abnormality [E87.8]     Sepsis ([facility]) [A41.9]     Hypoglycemia [E16.2]     Thrombocytopenia ([facility]) [D69.6]     Hyponatremia [E87.1]     Ulcer of both feet ([facility]) [L97.519, L97.529]     Opioid dependence ([facility]) [F11.20]     History of immunosuppressive therapy [Z92.25]     History of osteomyelitis [Z87.39]     S/P total knee arthroplasty, right [Z96.651]     Chronic, continuous use of opioids [F11.90]     History of bleeding ulcers [Z87.11]     MONICA (obstructive sleep apnea) [G47.33]      AHI 22, minimum saturation 77%, on CPAP 10 cm         Assessment/Plan:  Pain control issues improved  Dressing CDI  POD#3 S/P Left below knee amputation  Wt bearing status - NWB LLE  Wound care/Drains - Dressings to be changed every other day by nursing  Future Procedures - none planned   Lovenox: Start ok per " ortho, Duration-until ambulatory > 150'  Sutures/Staples out- 14-21 days post operatively  PT/OT-initiated  Antibiotics: ancef 2g IV Q8  DVT Prophylaxis- TEDS/SCDs/Foot pumps  R[place]a-none  Case Coordination for Discharge Planning - Disposition home

## 2023-03-12 NOTE — PROGRESS NOTES
Hypoglycemia Intervention    Hypoglycemia protocol intervention:  Blood glucose 48 at 1844.  Intervention: 8 oz of fruit juice   Repeat blood glucose 66 at 1928.  Intervention: Carb/Protein snack given, recheck blood glucose in 1 hour    MAGNUS Faith notified of the above at 1933. No new orders at this time. Report given to NOC RN, who agrees to monitor patient blood sugar per hospital policy. Charge RN notified.

## 2023-03-12 NOTE — PROGRESS NOTES
Hospital Medicine Daily Progress Note    Date of Service  3/12/2023    Chief Complaint  Richard Hubbard II is a 47 y.o. male admitted 2/28/2023 with cellulitis and sepsis    Hospital Course  Richard Hubbard II is a 47 y.o. male who presented 2/28/2023 with history of rheumatoid arthritis on Humira with history of planovalgus deformities and foot ulcers with recent MRI concerning for osteomyelitis of the left navicular bone presented to the emergency department for evaluation of confusion and fever of 103 associated with hypoglycemia and worsening redness of the lower extremities more so on the left lower extremity.  He has a history of chronic pain syndrome with narcotic dependence.  The patient is not diabetic and has not had any prior history of hypoglycemia.  According to the wife he has been in the past on prednisone for rheumatoid arthritis flares but has not taken any over the past month.  No headache no loss of consciousness.  Patient was noted to be hypotensive and hypoglycemic received dextrose infusion antibiotics and fluids resuscitation and admitted to IMCU.  Blood cultures grew group A strep    MRI performed of the left knee through foot yesterday with new findings of extended tenosynovitis     Interval Problem Update  Patient was seen and examined at bedside  Reported overall joint pain due to arthritis    Patient reported severe pain of left BKA site, not controlled by current pain meds.  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    Consultants/Specialty  critical care    Code Status  Full Code    Disposition  Patient is not medically cleared for discharge.   Anticipate discharge to to home with close outpatient follow-up.  I have placed the appropriate orders for post-discharge needs.    Review of Systems  Review of Systems   Constitutional:  Positive for malaise/fatigue. Negative for fever.   Respiratory:  Negative for shortness of breath.    Cardiovascular:  Negative for chest pain, palpitations and orthopnea.   Genitourinary:  Negative for dysuria, frequency and urgency.   Musculoskeletal:  Positive for joint pain.   Skin:  Positive for rash.   All other systems reviewed and are negative.     Physical Exam  Temp:  [36.2 °C (97.2 °F)-37.1 °C (98.8 °F)] 36.6 °C (97.9 °F)  Pulse:  [82-93] 87  Resp:  [16-18] 17  BP: ()/(63-74) 109/74  SpO2:  [95 %-97 %] 97 %    Physical Exam  Vitals and nursing note reviewed.   Constitutional:       Appearance: He is well-developed. He is not diaphoretic.   HENT:      Head: Normocephalic and atraumatic.      Mouth/Throat:      Pharynx: No oropharyngeal exudate.   Eyes:      General: No scleral icterus.        Right eye: No discharge.         Left eye: No discharge.      Pupils: Pupils are equal, round, and reactive to light.   Neck:      Vascular: No JVD.      Trachea: No tracheal deviation.   Cardiovascular:      Rate and Rhythm: Normal rate and regular rhythm.      Heart sounds: No murmur heard.    No friction rub. No gallop.   Pulmonary:      Effort: Pulmonary effort is normal. No respiratory distress.      Breath sounds: Normal breath sounds. No stridor. No wheezing.   Chest:      Chest wall: No tenderness.   Abdominal:      General: There is no distension.      Palpations: Abdomen is soft.      Tenderness: There is no abdominal tenderness. There is no rebound.      Hernia: A hernia (Soft and reducible) is present.   Musculoskeletal:          General: Swelling and tenderness present.      Cervical back: Neck supple.   Skin:     General: Skin is warm.      Findings: Erythema present.      Nails: There is no clubbing.      Comments: Left scrotal perineal rash    Left lower extremity erythema improved   Neurological:      Mental Status: He is alert and oriented to person, place, and time.      Cranial Nerves: No cranial nerve deficit.      Motor: No weakness or abnormal muscle tone.   Psychiatric:         Behavior: Behavior normal.       Fluids    Intake/Output Summary (Last 24 hours) at 3/12/2023 1305  Last data filed at 3/12/2023 0830  Gross per 24 hour   Intake 0 ml   Output 3500 ml   Net -3500 ml       Laboratory  Recent Labs     03/11/23  0426 03/12/23  0136   WBC 11.2* 9.2   RBC 3.02* 3.14*   HEMOGLOBIN 8.8* 9.0*   HEMATOCRIT 27.3* 27.6*   MCV 90.4 87.9   MCH 29.1 28.7   MCHC 32.2* 32.6*   RDW 48.8 48.1   PLATELETCT 289 307   MPV 9.6 9.3     Recent Labs     03/10/23  0431 03/11/23  0426 03/11/23  1919 03/12/23  0136   SODIUM 127* 130*  --  131*   POTASSIUM 4.7 4.6  --  5.1   CHLORIDE 101 103  --  101   CO2 18* 20  --  24   GLUCOSE 152* 85 83 83   BUN 12 10  --  9   CREATININE 0.48* 0.42*  --  0.50   CALCIUM 7.1* 6.9*  --  7.1*                     Imaging  IR-US GUIDED PIV   Final Result    Ultrasound-guided PERIPHERAL IV INSERTION performed by    qualified nursing staff as above.      [id number] & W/O LEFT   Final Result      1.  No evidence of left tibial or fibular osteomyelitis or fracture.      2.  Diffuse cellulitis      3.  Atrophy of the muscles of the lower leg with edema and patchy enhancement consistent with myositis. This involves the tibialis posterior muscle to the greatest degree.      4.  Some fluid seen superficial to the medial head of the gastrocnemius muscle. There is no evidence of rim enhancement to suggest presence of abscess.      MR-FOOT-WITH & W/O LEFT   Final Result      1.  Again seen marrow edema involving  the medial aspect of the navicular and the adjacent first cuneiform with some enhancement of that area with overlying soft tissue ulceration and some fluid within the overlying soft tissues consistent with    osteomyelitis versus reactive change. This is unchanged from comparison.      2.  Marrow edema with small nondisplaced trabecular fracture involving the first metatarsal head with enhancement of that area. Differential diagnosis includes nondisplaced stress/insufficiency fracture versus osteomyelitis.      3.  Again seen marrow edema involving the base of the second metatarsal with some abnormal enhancement with a small possible linear nondisplaced trabecular fracture extending through that area likely representing a small nondisplaced insufficiency    fracture. There is also arthropathy of that joint.      4.  Again seen marrow edema involving the cuneiforms and the adjacent metatarsals likely representing osteoarthritis.      5.  Osteoarthritis of the tibiotalar joint with chronic osteochondral injury of the lateral aspect of the talar dome. There is also osteoarthritis of the subtalar, talonavicular and calcaneocuboid articulations.      6.  Cellulitis involving the hindfoot most prominently medially.      7.  Prominent tenosynovitis of the tibialis posterior tendon and to a lesser degree the flexor digitorum longus and flexor hallucis tendons. This underlies the area of soft tissue ulceration and could potentially represent infectious tenosynovitis. This    is new from comparison.      8.  Pes planus and valgus deformity of the hindfoot.      CT-EXTREMITY, LOWER WITH LEFT   Final Result      1.  No evidence of acute osteomyelitis of the left lower extremity.      2.  No evidence of deep abscess formation in the subcutaneous tissues.      US-EXTREMITY VENOUS LOWER UNILAT LEFT   Final Result      DX-TIBIA AND FIBULA LEFT   Final Result      1.  Diffuse soft tissue swelling, edema and/or cellulitis.   2.  No  acute osseous abnormality.   3.  Osteopenia.      DX-FOOT-2- LEFT   Final Result      Severe osteopenia and flat foot deformity without evidence of acute displaced fracture      No definite radiographic evidence of osteomyelitis.      CT-HEAD W/O   Final Result      Head CT without contrast within normal limits. No evidence of acute cerebral infarction, hemorrhage or mass lesion.         DX-CHEST-PORTABLE (1 VIEW)   Final Result      No acute cardiac or pulmonary abnormalities are identified.             Assessment/Plan  * Sepsis (HCC)- (present on admission)  Assessment & Plan  Sepsis secondary to LLE tenosynovitis, group A strep  bacteremia  Continue cefazolin cefazolin  Duplex negative for DVT x-rays negative for acute bony pathology  Patient evaluated by orthopedics with nonsurgical management recommended and outpatient follow-up with Dr. Pryor  Continue wound care  Evaluated by ID    3/9 worsening sepsis due to   Plan for BKA for source control    Bacteremia  Assessment & Plan  Group A strep bacteremia  Repeated culture 2/28 negative    ID following    3/12 continue IV Ancef    Tenosynovitis of left lower leg  Assessment & Plan  MRI showed tenosynovitis of the tibialis posterior tendon, flexor digitorum longus and hallucis,     S/p left BKA 3/9,  Knee immobilizer to prevent flexion contracture    I discussed with ID, continue antibiotics to complete a 7 days course, okay to transition Ancef to Augmentin 875 mg twice daily    3/12 S/p left BKA 3/9, NWB LLE  Needs dressing change every other day, was changed yesterday, required IV Dilaudid  Sutures removal in 14 to 21 days postoperation    Hypocalcemia  Assessment & Plan  On oral supplement    3/11 calcium 6.9, ionized calcium 1.1    Continue monitor    Immunocompromised state due to drug therapy (HCC)  Assessment & Plan  Has been maintained on Humira and intermittently on prednisone     Yeast dermatitis  Assessment & Plan  Started on Diflucan and topical  clotrimazole  Skin care    Electrolyte abnormality  Assessment & Plan  Hypokalemia  Hypomagnesemia  Hypophosphatemia  Hypocalcemia    Likely component of refeeding syndrome patient started on thiamine supplements    Replete with K-Phos and IV magnesium sulfate   Start calcium citrate supplements   Monitor electrolytes    Hyponatremia  Assessment & Plan  3/10 Sodium 127, NS x 1L, advised to limit free water intake  monitor      Thrombocytopenia (HCC)  Assessment & Plan  Platelet counts 85K likely reactive secondary to sepsis  Monitor CBC        Hypoglycemia  Assessment & Plan  Likely secondary to poor intake  Hypoglycemia protocol  Continue D5 LR infusion overnight and if p.o. intake remains stable will DC in a.m.  Monitor CBGs    Ulcer of both feet ([facility])- (present on admission)  Assessment & Plan  Wound care  CT negative for abscess  MRI concerning for osteomyelitis patient evaluated by LPS continue wound care  Continue Ancef  Discussed with ID Dr. Galdamez recommends transitioning to p.o. Zyvox on discharge through 3/9/2023 followed by p.o. amoxicillin 1 g twice daily for 3 to 4 weeks and outpatient follow-up with ID and orthopedics  MRI performed of the left knee through foot yesterday with new findings of tenosynovitis which may be the explanation for his persistent elevated temperatures and mild leukocytosis.  ID following and will cont on IV abx for now     3/10 S/p left BKA 3/9 Dr Chambers    Opioid dependence ([facility])- (present on admission)  Assessment & Plan  With narcotic dependence    Patient reported that he uses contin 30mg bid and morphine 30 mg every 6 hours. However, I confirmed with pharmacy, it appears that his home dose is contin 15mg bid and morphine 15 mg every 6 hours    He has been following with Pain .    The dose were increased to contin 30mg bid and morphine 30 mg every 6 hours per his request and given his acute issuses    History of immunosuppressive therapy- (present on  admission)  Assessment & Plan  Home biweekly Humira and intermittent steroids        Rheumatoid arthritis (HCC)- (present on admission)  Assessment & Plan  Has been maintained on Humira and intermittently on prednisone although no recent steroid use  Continue to hold Humira  Cortisol level 45    tapered off hydrocortisone    History of bleeding ulcers  Assessment & Plan  Continue prilosec  Patient declines pharmacologic DVT prophylaxis discussed RBA       Please note that this dictation was created using voice recognition software. I have made every reasonable attempt to correct obvious errors, but I expect that there are errors of grammar and possibly context that I did not discover before finalizing the note.    VTE prophylaxis: SCDs/TEDsNote patient reports history of severe gastrointestinal bleed with any anticoagulant per patient request no anticoagulant for DVT prophylaxis ordere  I have performed a physical exam and reviewed and updated ROS and Plan today (3/12/2023). In review of yesterday's note (3/11/2023), there are no changes except as documented above.

## 2023-03-12 NOTE — CARE PLAN
The patient is Watcher - Medium risk of patient condition declining or worsening    Shift Goals  Clinical Goals: Blood sugar control, pain control and rest  Patient Goals: Pain control and rest  Family Goals: N/A    Progress made toward(s) clinical / shift goals:  Blood sugars     Patient is not progressing towards the following goals:    Problem: Pain - Standard  Goal: Alleviation of pain or a reduction in pain to the patient’s comfort goal  Outcome: Not Met  Note: Patient has a lot of pain despite Medication regiment.      Problem: Knowledge Deficit - Standard  Goal: Patient and family/care givers will demonstrate understanding of plan of care, disease process/condition, diagnostic tests and medications  Outcome: Not Met  Note: Patient Blood sugar was hard to maintain. Education was given on how much to eat and encourage protein and good carbs. Did the best we could, but blood sugar still ran low.

## 2023-03-12 NOTE — PROGRESS NOTES
" LIMB PRESERVATION SERVICE   POST SURGICAL PROGRESS NOTE      HPI:  Richard Hubbard II is a 47 y.o.  with a past medical history that includes hypertension, rheumatoid arthritis.  Admitted 2/28/2023 for Sepsis (HCC) [A41.9].     LPS has been consulted for bilateral plantar midfoot wounds.  The patient has had these wounds since early 2022.  He was seen at [facility] outpatient wound clinic in March 2022 briefly and then again from July 2022 through September 2022.   During this time patient was referred to [facility] and was seen by Dr. Pryor.  Patient suffers from rheumatoid arthritis and it was believed wounds were caused due to RA nodules.  Patient was seeing a podiatrist who was not comfortable removing these and referred the patient to [facility].   Bilateral MRIs were ordered in fall 2022.  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  Due to this patient did not have his bilateral LE MRIs completed at that time.  Patient completed bilateral MRIs of the feet on 1/28/2023.  Patient has not been able to follow-up at [facility] with Dr. Pryor previously.  Patient states that his children were ill last week and that he had symptoms on Saturday, 2/26/2023.  Patient then states he believes on the following Monday or Tuesday he began to have erythema and increasing edema mainly to his left LE.  Patient presented to the ED.    SURGERY DATE: 3/9/2023 Dr. Chambers  PROCEDURE: Left below knee amputation      INTERVAL HISTORY:  3/11/2023: POD #2.  Patient denies fevers, chills, nausea, vomiting.  Struggling with pain control.      PERTINENT LPS RESULTS:   Pathology: Pending  OR culture: None    SURGICAL SITE EXAM:      BP 99/67   Pulse 82   Temp 36.9 °C (98.4 °F) (Temporal)   Resp 17   Ht 1.575 m (5' 2.01\")   Wt 97.3 kg (214 lb 8.1 oz)   SpO2 95%   BMI 39.22 kg/m²   Monofilament testing " "completed on 3/2/2023  Sensation: Intact  Surgical limb warm    Pedal Pulses:   R foot: palpable DP, palpable PT  L BKA: Warm well perfused      Incision   location: Left BKA   Incision well approximated, sutures intact.   No erythema  Mild edema  Small, bloody drainage      dressing care completed by LPS APRN.  Left BKA: Cleanse with NS, pat dry.  Apply single layer of Adaptic over incision, secure with ABD pad, roll gauze, Ace wrap.  Place limb in knee immobilizer once it arrives at the bedside    Photo(s):         DIABETES MANAGEMENT:    A1c:   Lab Results   Component Value Date/Time    HBA1C 4.8 10/18/2019 05:18 AM            INFECTION MANAGEMENT:    Results from last 7 days   Lab Units 03/11/23  0426 03/08/23  0528 03/07/23  0052 03/05/23  0013   WBC 1501 K/uL 11.2* 17.6* 20.8* 11.2*   PLATELET COUNT 1518 K/uL 289 197 152* 94*     Wound culture results:   Results       Procedure Component Value Units Date/Time    Blood Culture [[id number]] Collected: 02/28/23 2011    Order Status: Completed Specimen: Blood from Peripheral Updated: 03/05/23 2300     Significant Indicator NEG     Source BLD     Site PERIPHERAL     Culture Result No growth after 5 days of incubation.    Narrative:      From different peripheral sites, if not done within the last  24 hours (Per Hospital Policy: Only change specimen source to  \"Line\" if specified by physician order)  Left Forearm/Arm    Blood Culture [[id number]] Collected: 02/28/23 2146    Order Status: Completed Specimen: Blood from Peripheral Updated: 03/05/23 2300     Significant Indicator NEG     Source BLD     Site PERIPHERAL     Culture Result No growth after 5 days of incubation.    Narrative:      From different peripheral sites, if not done within the last  24 hours (Per Hospital Policy: Only change specimen source to  \"Line\" if specified by physician order)  Right Forearm/Arm                 ASSESSMENT/PLAN:   POD #2 S/P left BKA Dr. Chambers    -Patient barely able to " tolerate dressing change today despite extra one-time IV 1 mg hydromorphone  -Medications are currently being adjusted in order to obtain improved pain control  -Dressing changes to be ordered every 2 days due to pain with dressing changes  -Discussed with patient and updated [facility] orthotist on patient's left BKA    Wound care:   -Wound care orders updated for nursing by [facility]   -Left BKA: Cleanse with NS, pat dry.  Apply single layer of Adaptic over incision, secure with ABD pad, roll gauze, Ace wrap.    Imaging/Labs:  -COVID-19: not completed this admission    Vascular status:   -R foot: palpable DP, palpable PT  L BKA: Warm well perfused    Surgery:   --no further surgeries planned at this time     Antibiotics:   -ID involved     Weight Bearing Status:   -Non Weight bearing    Offloading:   -Immobilizer;   - Offloading device ordered  -Orthotic company: [employer], updated on new left BKA      PT/OT:   - involved. Last seen 3/10.         DISCHARGE PLAN:    Disposition:   -TBD  -recommend patient discharges to  Rehab      Follow-up: Dr. Chambers. Sutures to be removed approximately 4-6 weeks post-op        Discussed with: pt, RN, . He            Please note that this dictation was created using voice recognition software. I have  worked with technical experts from "[employer] to optimize the interface.  I have made every reasonable attempt to correct obvious errors, but there may be errors of grammar and possibly content that I did not discover before finalizing the note.      Jania Pichardo, A.P.R.N.    If any questions or concerns, please contact [facility] through voalte.

## 2023-03-12 NOTE — PROGRESS NOTES
4 Eyes Skin Assessment Completed by FAITH Lowery and FAITH Donovan.     Head WDL  Ears WDL  Nose WDL  Mouth scab  Neck WDL  Breast/Chest Redness  Shoulder Blades WDL  Spine rash  (R) Arm/Elbow/Hand Redness, Blanching, and Scab  (L) Arm/Elbow/Hand Redness, Blanching, and Scab  Abdomen Hernia  Groin Redness, Blanching, and Excoriation  Scrotum/Coccyx/Buttocks Redness, Blanching, Excoriation, and Moisture Fissure  (R) Leg Discoloration, scattered scabs  (L) Leg L BKA sx dressing CDI  (R) Heel/Foot/Toe Redness and Blanching RA Deformity, wound with dressing  (L) Heel/Foot/Toe N/A BKA     Patient was initially on a STEPHANIE bed but requested to be transferred to a regular bed d/t being uncomfortable and pain.        Devices In Places Blood Pressure Cuff, Pulse Ox, and SCD's        Interventions In Place Waffle Overlay, Pillows, Barrier Cream, Heels Loaded W/Pillows, and Pressure Redistribution Mattress     Possible Skin Injury Yes     Pictures Uploaded Into Epic Yes  Wound Consult Placed N/A wound following  RN Wound Prevention Protocol Ordered Yes

## 2023-03-12 NOTE — PROGRESS NOTES
Patient ate TV dinner (chicken) recheck Blood sugar is at 79. Left Orange Juice at bedside and will check again in 15- 30mins.     0950 Blood sugar at 85 snacks at bedside and patient was educated to snack through out the night.     0000 Blood sugar 87 with snacks at bedside.     0440 Blood sugars at  70 gave patient 18 oz of Orange Juice and A boost at bedside. Will check again in 15 mins.     0528 Blood sugar 86. Finished Orange and Protein shake. Orange Juice 8oz at bedside with snacks.

## 2023-03-12 NOTE — PROGRESS NOTES
Report received. Assumed care. Pt in bed sitting up watching TV eating a TV dinner Blood sugar is low. A/O x4. VSS. Responds appropriately. Complains 8-10 pain medicated per MAR, SOB. Assessment complete. Discussed POC, , pt verbalizes understanding. Explained importance of calling before getting OOB. Call light and belongings within reach. Bed alarm on. Bed in the lowest position. Treaded socks in place. Hourly rounding in progress. Will continue to monitor .

## 2023-03-13 NOTE — THERAPY
"Physical Therapy   Daily Treatment     Patient Name: Richard Hubbard II  Age:  47 y.o., Sex:  male  Medical Record #: [id number]  Today's Date: 3/13/2023     Precautions  Precautions: Fall Risk;Non Weight Bearing Left Lower Extremity;Weight Bearing As Tolerated Right Lower Extremity;Immobilizer Left Lower Extremity  Comments: s/p L BKA, immobilizer not in room    Assessment    Pt now POD #4, seen for f/u treatment. Pt demonstrated improved bed mobility to [place] for B LE assist on/off bed, but was unable to perform STS this session with 2 person assist. Pt endorsing high pain and B UE and LE weakness due to RA flair up. Pt continues to present with pain, impaired mobility, balance, decreased activity tolerance, and gross decreased UE and LE strength. Recommend placement upon d/c as pt below functional baseline. No DME needs at this time.    Plan    Treatment Plan Status: Continue Current Treatment Plan  Type of Treatment: Bed Mobility, Equipment, Neuro Re-Education / Balance, Gait Training, Orthotics Training , Prosthetics Training, Self Care / Home Evaluation, Stair Training, Therapeutic Activities, Therapeutic Exercise  Treatment Frequency: 5 Times per Week  Treatment Duration: Until Therapy Goals Met    DC Equipment Recommendations: Unable to determine at this time  Discharge Recommendations: Recommend post-acute placement for additional physical therapy services prior to discharge home      Subjective    \"I just have no strength right now\"     Objective       03/13/23 1536   Cosignature   Documentation Review Approved with modifications made by preceptor in flowsheet   Precautions   Precautions Fall Risk;Non Weight Bearing Left Lower Extremity;Weight Bearing As Tolerated Right Lower Extremity;Immobilizer Left Lower Extremity   Comments s/p L BKA, immobilizer not in room   Vitals   O2 (LPM) 0   O2 Delivery Device None - Room Air   Vitals Comments Pt reported no dizziness throughout session   Pain 0 - 10 Group " "  Location Generalized   Location Orientation Right;Left;Anterior;Posterior;Mid   Pain Rating Scale (NPRS) 9   Description Constant   Therapist Pain Assessment During Activity;Nurse Notified  (Pt endorsing high pain throughout session)   Cognition    Cognition / Consciousness WDL   Level of Consciousness Alert   Comments Agreeable to therapy session, limited by pain   Passive ROM Lower Body   Passive ROM Lower Body X   Comments RLE tibial external rotation and limited ankle DF baseline, otherwise WDL   Active ROM Lower Body    Active ROM Lower Body  X   Comments L knee flexion to 80 deg. L LAQ completed this session   Strength Lower Body   Lower Body Strength  X   Comments R ankle DF 3-/5 within available range, knee ext 3/5, hip flexion 2+/5. L knee ext 3/5, hip flexion 2+/5, hip abd/add 2-/5. Improved L quad activation this session   Sensation Lower Body   Lower Extremity Sensation   X   Comments c/o BLE numbness/tingling   Lower Body Muscle Tone   Lower Body Muscle Tone  WDL   Supine Lower Body Exercise   Supine Lower Body Exercises Yes   Comments educated on B LE strengthening while in bed and at EOB. Provided with \"total hip\" handout   Neuro-Muscular Treatments   Neuro-Muscular Treatments Anterior weight shift;Postural Facilitation;Verbal Cuing;Sequencing;Compensatory Strategies   Comments seated   Other Treatments   Other Treatments Provided Continued education regarding benefits of OOB mobility to improve strength and tolerance to therapy. Continued education regarding desensitization techniques, pt reporting that phantom limb sensation has been improving.   Neurological Concerns   Neurological Concerns Yes   Sitting Posture During ADL's Kyphotic;Posterior Pelvic Tilt;Forward Head   Paresthesia Present   Balance   Sitting Balance (Static) Fair   Sitting Balance (Dynamic) Fair -   Standing Balance (Static)   (NT)   Standing Balance (Dynamic)   (NT)   Weight Shift Sitting Fair   Weight Shift Standing   (NT) "   Skilled Intervention Compensatory Strategies;Postural Facilitation;Verbal Cuing   Comments pt unable to complete full stand today.   Bed Mobility    Supine to Sit Minimal Assist   Sit to Supine Minimal Assist   Scooting Moderate Assist   Rolling Standby Assist   Skilled Intervention Compensatory Strategies;Sequencing;Verbal Cuing   Comments required assist for B LE on/off bed, boost in bed   Gait Analysis   Gait Level Of Assist Unable to Participate   Weight Bearing Status LLE NWB, immobilizer at rest   Comments pt has preferred R shoe   Functional Mobility   Sit to Stand   (attempted 2 STS with 2 person assist, unable to complete due to R LE weakness)   Bed, Chair, Wheelchair Transfer Unable to Participate   Mobility supine > EOB > return supine   Skilled Intervention Compensatory Strategies;Postural Facilitation;Tactile Cuing;Verbal Cuing   Comments standing tolerance decreased this session 2/2 to increased weakness and pain   How much difficulty does the patient currently have...   Turning over in bed (including adjusting bedclothes, sheets and blankets)? 1   Sitting down on and standing up from a chair with arms (e.g., wheelchair, bedside commode, etc.) 1   Moving from lying on back to sitting on the side of the bed? 1   How much help from another person does the patient currently need...   Moving to and from a bed to a chair (including a wheelchair)? 2   Need to walk in a hospital room? 1   Climbing 3-5 steps with a railing? 1   6 clicks Mobility Score 7   Activity Tolerance   Sitting in Chair NT   Sitting Edge of Bed 10 min   Standing < 10 sec   Comments decreased tolerance in standing 2/2 to R LE weakness, RA flair up and pain in B wrists and L shoulder   Short Term Goals    Short Term Goal # 1 Pt will perform supine <> sit without bed features with SPV in 6 visits to progress functional independence   Goal Outcome # 1 Progressing slower than expected   Short Term Goal # 2 Pt will perform STS with FWW and  min A in 6 visits to progress OOB mobility   Goal Outcome # 2 Progressing slower than expected   Short Term Goal # 3 Pt will perform slide board transfer with Lisa in 6 visits to improve functional independence   Goal Outcome # 3 Goal not met  (not attempted 2/2 to poor B UE strength during bed mobility)   Short Term Goal # 4 Pt will self propel w/c 250ft with SPV in 6 visits to safely access community distances   Goal Outcome # 4 Goal not met   Education Group   Education Provided Role of Physical Therapist;Use of Assistive Device;Exercises - Supine   Role of Physical Therapist Patient Response Patient;Family;Acceptance;Explanation;Action Demonstration   Use of Assistive Device Patient Response Patient;Acceptance;Explanation;Action Demonstration   Exercises - Supine Patient Response Patient;Acceptance;Explanation;Demonstration;Handout;Verbal Demonstration;Action Demonstration   Physical Therapy Treatment Plan   Physical Therapy Treatment Plan Continue Current Treatment Plan   Anticipated Discharge Equipment and Recommendations   DC Equipment Recommendations Unable to determine at this time   Discharge Recommendations Recommend post-acute placement for additional physical therapy services prior to discharge home   Interdisciplinary Plan of Care Collaboration   IDT Collaboration with  Nursing;Occupational Therapist   Patient Position at End of Therapy In Bed;Call Light within Reach;Tray Table within Reach;Phone within Reach   Collaboration Comments RN updated   Session Information   Date / Session Number  3/13- 4 (2/5, 3/16)

## 2023-03-13 NOTE — DIETARY
Nutrition Services Brief Update:    Day 13 of admit.  Richard Hubbard II is a 47 y.o. male with admitting DX of Sepsis (HCC) [A41.9]    Current Diet: Regular with supplements    Problem: Nutritional:  Goal: Achieve adequate nutritional intake  Description: Patient will consume >50% of meals  Outcome: Met    Per flowsheets, PO intake has fluctuated some but pt has been eating >50% of majority of meals since 3/9.    RD monitoring per dept policy

## 2023-03-13 NOTE — THERAPY
Occupational Therapy  Daily Treatment     Patient Name: Richard Hubbard II  Age:  47 y.o., Sex:  male  Medical Record #: [id number]  Today's Date: 3/13/2023     Precautions: Fall Risk, Weight Bearing As Tolerated Right Lower Extremity, Non Weight Bearing Left Lower Extremity, Immobilizer Left Lower Extremity  Comments: s/p L BKA, immobilizer not in room    Assessment  Pt seen now s/p L BKA. Reviewed desensitization and positioning. Pt requiring increased assist with mobility and self cares today. He is limited by pain, weakness, poor balance, and poor activity tolerance. Continue to recommend placement. Will follow for skilled OT services.    Plan    Treatment Plan Status: (P) Continue Current Treatment Plan  Type of Treatment: Self Care / Activities of Daily Living, Neuro Re-Education / Balance, Therapeutic Exercises, Therapeutic Activity, Adaptive Equipment  Treatment Frequency: 3 Times per Week  Treatment Duration: Until Therapy Goals Met    DC Equipment Recommendations: (P) Unable to determine at this time  Discharge Recommendations: (P) Recommend post-acute placement for additional occupational therapy services prior to discharge home     03/13/23 1527   Cognition    Cognition / Consciousness WDL   Comments agreeable, limited by pain   Balance   Sitting Balance (Static) Fair -   Sitting Balance (Dynamic) Poor +   Standing Balance (Static) Dependent   Weight Shift Sitting Poor   Weight Shift Standing Absent   Skilled Intervention Verbal Cuing;Tactile Cuing   Bed Mobility    Supine to Sit Minimal Assist   Sit to Supine Moderate Assist   Scooting Moderate Assist   Skilled Intervention Verbal Cuing;Tactile Cuing;Compensatory Strategies   Activities of Daily Living   Lower Body Dressing Total Assist   Skilled Intervention Verbal Cuing   How much help from another person does the patient currently need...   6 Clicks Daily Activity Score 10   Functional Mobility   Sit to Stand Unable to Participate  (attempted 2x  2pa STS, unable to complete)   Patient / Family Goals   Patient / Family Goal #1 To get better   Short Term Goals   Short Term Goal # 1 Pt will demo LB dressing w/ SPV   Goal Outcome # 1 Goal not met   Short Term Goal # 2 Pt will demo standing grooming w/ SPV   Goal Outcome # 2 Goal not met   Short Term Goal # 3 Pt will demo toilet txf w/ SPV   Goal Outcome # 3 Goal not met   Education Group   Role of Occupational Therapist Patient Response Patient;Acceptance;Explanation;Demonstration;Verbal Demonstration;Action Demonstration   Occupational Therapy Treatment Plan    O.T. Treatment Plan Continue Current Treatment Plan   Anticipated Discharge Equipment and Recommendations   DC Equipment Recommendations Unable to determine at this time   Discharge Recommendations Recommend post-acute placement for additional occupational therapy services prior to discharge home

## 2023-03-13 NOTE — DISCHARGE PLANNING
[facility] Transitional [facility] Coordination    Physiatry Dr. Pascual recommending good candidate for inpatient rehab.     Call out to [facility] liaison seeking authorization for IRF level care.  Would welcome OT update.      Will follow for auth determination.     1159 - [facility] auth for IPR [id number].        1415 -  [address] potential barrier to IRF.  IV dilaudid barrier to IRF.  Voate message to LINSEY Weinberg.  Will continue to follow for potential admission.

## 2023-03-13 NOTE — DISCHARGE PLANNING
Received an email from pt's wife with FMLA papers attached. Wife is requesting for FMLA to be signed by a doctor and returned. CM forwarded the email to Walt Guerra to assist.

## 2023-03-13 NOTE — CARE PLAN
The patient is Watcher - Medium risk of patient condition declining or worsening    Shift Goals  Clinical Goals: pain control, mobility, safety, skin integrity, blood sugar checks, MAP above 60,  Patient Goals: pain control, comfort, rest, increased blood sugar  Family Goals: pain control, stable BS checks, comfort, rest      Patient is not progressing towards the following goals:      Problem: Pain - Standard  Goal: Alleviation of pain or a reduction in pain to the patient’s comfort goal  Outcome: Not Progressing  Note: Pt states pain is chronic and not well managed on current medication regimen. Medications administered per MAR, ice pack applied, pillows for comfort and repositioning.      Problem: Mobility  Goal: Patient's capacity to carry out activities will improve  Outcome: Not Progressing  Note: Patient not mobilizing due to pain

## 2023-03-13 NOTE — PROGRESS NOTES
4 Eyes Skin Assessment Completed by FAITH Haines and FAITH Hawthorne.     Head WDL  Ears WDL  Nose WDL  Mouth scab  Neck WDL  Breast/Chest Redness/ rash under breast/ on rib cage  Shoulder Blades Redness/ Blanching  Spine rash/ redness/ blanching  (R) Arm/Elbow/Hand Redness, Blanching, and Scab  (L) Arm/Elbow/Hand Redness, Blanching, and Scab  Abdomen Hernia to mid abdomen  Groin Redness, Blanching, and Excoriation  Scrotum/Coccyx/Buttocks Redness, Blanching, Excoriation, and Moisture Fissure  (R) Leg Discoloration, scattered scabs  (L) Leg L BKA sx dressing CDI  (R) Heel/Foot/Toe Redness and Blanching RA Deformity, wound with dressing in place  (L) Heel/Foot/Toe N/A BKA           Devices In Places Blood Pressure Cuff, Pulse Ox, and SCD's to RLE,        Interventions In Place Waffle Overlay, Pillows, Barrier Cream, Heels Loaded W/Pillows, and Pressure Redistribution Mattress after requesting switch from STEPHANIE     Possible Skin Injury Yes, already documented     Pictures Uploaded Into Epic Yes, already done  Wound Consult Placed N/A wound following  RN Wound Prevention Protocol Ordered Yes, already done

## 2023-03-14 PROBLEM — E55.9 VITAMIN D DEFICIENCY: Status: ACTIVE | Noted: 2023-01-01

## 2023-03-14 NOTE — THERAPY
"Physical Therapy   Daily Treatment     Patient Name: Richard Hubbard II  Age:  47 y.o., Sex:  male  Medical Record #: [id number]  Today's Date: 3/14/2023     Precautions  Precautions: Fall Risk;Non Weight Bearing Left Lower Extremity;Weight Bearing As Tolerated Right Lower Extremity;Immobilizer Left Lower Extremity  Comments: s/p L BKA, immobilizer not in room    Assessment    Pt now POD #5, seen for f/u treatment. Pt demonstrated improved bed mobility to [facility] for supine to sit, Adore for sit to supine for B LE assist onto bed, STSx2 with minAx2 persons and FWW. Pt had good recall of therex from prior session and demonstrated improved B quad, hamstring, and glute activation. Pt continues to present with pain, impaired mobility, balance, decreased activity tolerance, and gross decreased UE and LE strength. Recommend placement upon d/c as pt below functional baseline. No DME needs at this time    Plan    Treatment Plan Status: Continue Current Treatment Plan  Type of Treatment: Bed Mobility, Equipment, Neuro Re-Education / Balance, Gait Training, Orthotics Training , Prosthetics Training, Self Care / Home Evaluation, Stair Training, Therapeutic Activities, Therapeutic Exercise  Treatment Frequency: 5 Times per Week  Treatment Duration: Until Therapy Goals Met    DC Equipment Recommendations: Unable to determine at this time  Discharge Recommendations: Recommend post-acute placement for additional physical therapy services prior to discharge home      Subjective    \"I'm so thankful for you ladies\"     Objective       03/14/23 1537   Cosignature   Documentation Review Approved with modifications made by preceptor in flowsheet   Precautions   Precautions Fall Risk;Non Weight Bearing Left Lower Extremity;Weight Bearing As Tolerated Right Lower Extremity;Immobilizer Left Lower Extremity   Comments s/p L BKA, immobilizer not in room   Vitals   O2 (LPM) 0   O2 Delivery Device None - Room Air   Vitals Comments Pt reported " dizziness while standing, resolved sitting EOB   Pain 0 - 10 Group   Therapist Pain Assessment During Activity;Nurse Notified  (Pt stating pain during movement, however, later stated that he felt his pain was well managed during the session)   Cognition    Cognition / Consciousness WDL   Level of Consciousness Alert   Comments Pleasant and cooperative. Pt reporting high satisfaction with therapy and therex, stating he feels stronger. Pt demonstrated mild emotional lability at beginning of session, expressing frustration that the bed wasn't raised higher to help him stand. Easily calmed once request met. Pt cried at end of session stating he was thankful for PT.   Passive ROM Lower Body   Passive ROM Lower Body X   Comments RLE tibial external rotation and limited ankle DF baseline, otherwise WDL   Active ROM Lower Body    Active ROM Lower Body  X   Comments L knee flexion to 100 deg. this session. L knee extension to 0   Strength Lower Body   Lower Body Strength  X   Comments R ankle DF 3-/5 within available range, knee ext 3+/5, hip flexion 2+/5. L knee ext 3/5, hip flexion 2+/5, hip abd/add 2/5. Improved L LE strength this session. Pt reports this is due to therex   Sensation Lower Body   Lower Extremity Sensation   X   Comments c/o BLE numbness/tingling   Lower Body Muscle Tone   Lower Body Muscle Tone  WDL   Supine Lower Body Exercise   Supine Lower Body Exercises Yes   Comments Therex handout from previous session reviewed again with pt. Pt reporting no concerns regarding details or frequency following session   Sitting Lower Body Exercises   Sitting Lower Body Exercises Yes   Long Arc Quad   (1x5, hold end-range 5 seconds. Therapist assist for full knee extension on last rep due to fatigue of L quad)   Hamstring Curl   (1x5)   Neuro-Muscular Treatments   Neuro-Muscular Treatments Anterior weight shift;Compensatory Strategies;Postural Changes;Tapping;Sequencing;Tactile Cuing;Verbal Cuing;Weight Shift  Right;Weight Shift Left   Comments seated and standing with FWW   Other Treatments   Other Treatments Provided Continued education regarding benefits of OOB mobility to improve strength and tolerance to therapy. Encouraged to get to EOB more than once daily with nursing as able   Neurological Concerns   Neurological Concerns Yes   Sitting Posture During ADL's Kyphotic;Posterior Pelvic Tilt;Forward Head   Paresthesia Present   Balance   Sitting Balance (Static) Fair   Sitting Balance (Dynamic) Fair -   Standing Balance (Static) Poor   Standing Balance (Dynamic) Poor -   Weight Shift Sitting Poor   Weight Shift Standing Absent  (L BKA)   Skilled Intervention Compensatory Strategies;Sequencing;Tactile Cuing;Verbal Cuing   Comments with FWW   Bed Mobility    Supine to Sit Contact Guard Assist   Sit to Supine Minimal Assist   Scooting Moderate Assist   Skilled Intervention Compensatory Strategies;Sequencing;Verbal Cuing   Comments required assist for B LE on bed, boost in bed   Gait Analysis   Gait Level Of Assist Unable to Participate   Weight Bearing Status L LE NWB   Comments pt has preferred R shoe   Functional Mobility   Sit to Stand Minimal Assist  (x2 persons)   Mobility supine > EOB > STSx2 > return supine   Skilled Intervention Compensatory Strategies;Facilitation;Postural Facilitation;Sequencing;Tactile Cuing;Verbal Cuing   Comments required increased time and effort 2/2 to pain and R LE weakness. Bed height raised for 2 STS   How much difficulty does the patient currently have...   Turning over in bed (including adjusting bedclothes, sheets and blankets)? 2   Sitting down on and standing up from a chair with arms (e.g., wheelchair, bedside commode, etc.) 1   Moving from lying on back to sitting on the side of the bed? 1   How much help from another person does the patient currently need...   Moving to and from a bed to a chair (including a wheelchair)? 1   Need to walk in a hospital room? 1   Climbing 3-5  steps with a railing? 1   6 clicks Mobility Score 7   Activity Tolerance   Sitting in Chair NT   Sitting Edge of Bed 10 min   Standing 2 min   Comments standing tolerance increased this session 2/2 to improved R LE strength and pain management. Pt standing tolerance limited by pain in B wrists from RA   Short Term Goals    Short Term Goal # 1 Pt will perform supine <> sit without bed features with SPV in 6 visits to progress functional independence   Goal Outcome # 1 Progressing slower than expected   Short Term Goal # 2 Pt will perform STS with FWW and min A in 6 visits to progress OOB mobility   Goal Outcome # 2 Goal met, new goal added   Short Term Goal # 2 B  Pt will perform STS with FWW and SPV in 6 visits to progress OOB mobility   Short Term Goal # 3 Pt will perform slide board transfer with Lisa in 6 visits to improve functional independence   Goal Outcome # 3 Goal not met  (not attempted 2/2 to poor B UE strength during bed mobility)   Short Term Goal # 4 Pt will self propel w/c 250ft with SPV in 6 visits to safely access community distances   Goal Outcome # 4 Goal not met   Education Group   Education Provided Role of Physical Therapist;Use of Assistive Device;Exercises - Supine;Exercises - Seated   Role of Physical Therapist Patient Response Patient;Family;Acceptance;Explanation;Action Demonstration   Use of Assistive Device Patient Response Patient;Acceptance;Explanation;Action Demonstration   Exercises - Supine Patient Response Patient;Acceptance;Explanation;Demonstration;Handout;Verbal Demonstration;Action Demonstration   Exercises - Seated Patient Response Patient;Acceptance;Explanation;Demonstration;Action Demonstration   Physical Therapy Treatment Plan   Physical Therapy Treatment Plan Continue Current Treatment Plan   Anticipated Discharge Equipment and Recommendations   DC Equipment Recommendations Unable to determine at this time   Discharge Recommendations Recommend post-acute placement for  additional physical therapy services prior to discharge home   Interdisciplinary Plan of Care Collaboration   IDT Collaboration with  Nursing   Patient Position at End of Therapy In Bed;Call Light within Reach;Tray Table within Reach;Phone within Reach;Family / Friend in Room   Collaboration Comments RN updated   Session Information   Date / Session Number  3/14- 5 (3/5, 3/16)

## 2023-03-14 NOTE — PROGRESS NOTES
4 Eyes Skin Assessment Completed by Yancy RN and Ivana RN.     Head WDL  Ears WDL  Nose WDL  Mouth scab  Neck WDL  Breast/Chest Redness/ rash under breast/ on rib cage  Shoulder Blades Redness/ Blanching  Spine rash/ redness/ blanching  (R) Arm/Elbow/Hand Redness, Blanching, and Scab  (L) Arm/Elbow/Hand Redness, Blanching, and Scab  Abdomen Hernia to mid abdomen  Groin Redness, Blanching, and Excoriation  Scrotum/Coccyx/Buttocks Redness, Blanching, Excoriation, and Moisture Fissure  (R) Leg Discoloration, scattered scabs  (L) Leg L BKA sx dressing CDI  (R) Heel/Foot/Toe Redness and Blanching RA Deformity, wound with dressing in place  (L) Heel/Foot/Toe N/A BKA           Devices In Places Blood Pressure Cuff, Pulse Ox, and SCD's to RLE,        Interventions In Place Waffle Overlay, Pillows, Barrier Cream, Heels Loaded W/Pillows, and Pressure Redistribution Mattress after requesting switch from STEPHANIE     Possible Skin Injury Yes, already documented     Pictures Uploaded Into Epic Yes, already done  Wound Consult Placed wound following  RN Wound Prevention Protocol Ordered Yes, already done

## 2023-03-14 NOTE — CARE PLAN
Problem: Pain - Standard  Goal: Alleviation of pain or a reduction in pain to the patient’s comfort goal  Outcome: Progressing     Problem: Fall Risk  Goal: Patient will remain free from falls  Outcome: Progressing   The patient is Stable - Low risk of patient condition declining or worsening    Shift Goals  Clinical Goals: pain control  Patient Goals: pain control  Family Goals: not present at bedside    Progress made toward(s) clinical / shift goals:  pt medicated PRN for pain control. Hourly rounding in place for safety.    Patient is not progressing towards the following goals:

## 2023-03-14 NOTE — PROGRESS NOTES
Hospital Medicine Daily Progress Note    Date of Service  3/14/2023    Chief Complaint  Richard Hubbard II is a 47 y.o. male admitted 2/28/2023 with cellulitis and sepsis    Hospital Course  RThis is a 47-year-old male with a past medical history significant for rheumatoid arthritis on Humira, history of planovalgus deformities and foot ulcers with recent MRI concerning for osteomyelitis of the left navicular bone presented ER on 2/28 with altered mental status, fever at 103 and hypoglycemia, worsening erythema of the left lower extremity.  Patient was then being admitted for sepsis secondary to left lower extremity cellulitis.    Considering patient being hypotensive and hypoglycemic patient was admitted to [facility].  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  ID recommended completing antibiotics for 7 days to treat residual cellulitis chills 3/15/2023.  Patient can be discharged on Augmentin 875/121 tablet p.o. twice daily.     PT/OT evaluate the patient medical and postacute, physiatry has been evaluating the patient      Interval events:  -- No acute events overnight, vital signs been stable, patient is alert, awake, answering questions appropriately.  He does have a left BKA done by Dr. Sanchez on 3/9/2023  -- Continue IV antibiotics till 3/15/2023 as per ID recommendation  -- PT/OT has evaluate the patient, recommend postacute; physiatry following and stated that patient is a candidate for inpatient rehab; awaiting insurance Auth   -- Na at 129  -- Hemoglobin 9.4, monitor, vitamin D10.1, will provide ergocalciferol    I have discussed this patient's plan of care and discharge plan at IDT rounds today with Case Management, Nursing, Nursing leadership, and other members of the IDT team.    Consultants/Specialty  critical care    Code Status  Full  Code    Disposition  Patient is not medically cleared for discharge.   Anticipate discharge to to home with close outpatient follow-up.  I have placed the appropriate orders for post-discharge needs.    Review of Systems  Review of Systems   Constitutional:  Positive for malaise/fatigue.   HENT:  Negative for congestion and sore throat.    Eyes:  Negative for blurred vision.   Respiratory:  Negative for shortness of breath.    Cardiovascular:  Negative for chest pain, palpitations and orthopnea.   Gastrointestinal:  Negative for heartburn, nausea and vomiting.   Genitourinary:  Negative for dysuria.   Musculoskeletal:  Positive for joint pain.   Skin:  Negative for rash.   Psychiatric/Behavioral:  The patient is nervous/anxious.    All other systems reviewed and are negative.     Physical Exam  Temp:  [36.6 °C (97.9 °F)-37.3 °C (99.1 °F)] 36.6 °C (97.9 °F)  Pulse:  [75-94] 76  Resp:  [16-19] 17  BP: ()/(58-74) 108/73  SpO2:  [92 %-99 %] 95 %    Physical Exam  Vitals and nursing note reviewed.   Constitutional:       Appearance: He is well-developed. He is not diaphoretic.   HENT:      Head: Normocephalic and atraumatic.      Mouth/Throat:      Pharynx: No oropharyngeal exudate.   Eyes:      General: No scleral icterus.        Right eye: No discharge.         Left eye: No discharge.      Pupils: Pupils are equal, round, and reactive to light.   Neck:      Vascular: No JVD.      Trachea: No tracheal deviation.   Cardiovascular:      Rate and Rhythm: Normal rate and regular rhythm.      Heart sounds: No murmur heard.    No friction rub. No gallop.   Pulmonary:      Effort: Pulmonary effort is normal. No respiratory distress.      Breath sounds: Normal breath sounds. No stridor. No wheezing.   Chest:      Chest wall: No tenderness.   Abdominal:      General: There is no distension.      Palpations: Abdomen is soft.      Tenderness: There is no abdominal tenderness. There is no rebound.      Hernia: A hernia (Soft  and reducible) is present.   Musculoskeletal:         General: Swelling and tenderness present.      Cervical back: Neck supple.   Skin:     General: Skin is warm.      Findings: Erythema present.      Nails: There is no clubbing.      Comments: Left scrotal perineal rash    Left lower extremity erythema improved   Neurological:      Mental Status: He is alert and oriented to person, place, and time.      Cranial Nerves: No cranial nerve deficit.      Motor: No weakness or abnormal muscle tone.   Psychiatric:         Behavior: Behavior normal.       Fluids    Intake/Output Summary (Last 24 hours) at 3/14/2023 1322  Last data filed at 3/14/2023 0715  Gross per 24 hour   Intake --   Output 3675 ml   Net -3675 ml         Laboratory  Recent Labs     03/12/23 0136 03/13/23 0441 03/14/23  0051   WBC 9.2 8.4 8.2   RBC 3.14* 3.35* 3.22*   HEMOGLOBIN 9.0* 9.8* 9.4*   HEMATOCRIT 27.6* 30.2* 28.9*   MCV 87.9 90.1 89.8   MCH 28.7 29.3 29.2   MCHC 32.6* 32.5* 32.5*   RDW 48.1 48.0 47.8   PLATELETCT 307 340 348   MPV 9.3 9.1 8.9*       Recent Labs     03/12/23 0136 03/13/23 0441 03/14/23  0051   SODIUM 131* 127* 129*   POTASSIUM 5.1 4.9 4.1   CHLORIDE 101 95* 96   CO2 24 26 24   GLUCOSE 83 85 89   BUN 9 8 7*   CREATININE 0.50 0.43* 0.52   CALCIUM 7.1* 7.5* 7.7*                       Imaging  IR-US GUIDED PIV   Final Result    Ultrasound-guided PERIPHERAL IV INSERTION performed by    qualified nursing staff as above.      [id number] & W/O LEFT   Final Result      1.  No evidence of left tibial or fibular osteomyelitis or fracture.      2.  Diffuse cellulitis      3.  Atrophy of the muscles of the lower leg with edema and patchy enhancement consistent with myositis. This involves the tibialis posterior muscle to the greatest degree.      4.  Some fluid seen superficial to the medial head of the gastrocnemius muscle. There is no evidence of rim enhancement to suggest presence of abscess.      MR-FOOT-WITH & W/O LEFT    Final Result      1.  Again seen marrow edema involving the medial aspect of the navicular and the adjacent first cuneiform with some enhancement of that area with overlying soft tissue ulceration and some fluid within the overlying soft tissues consistent with    osteomyelitis versus reactive change. This is unchanged from comparison.      2.  Marrow edema with small nondisplaced trabecular fracture involving the first metatarsal head with enhancement of that area. Differential diagnosis includes nondisplaced stress/insufficiency fracture versus osteomyelitis.      3.  Again seen marrow edema involving the base of the second metatarsal with some abnormal enhancement with a small possible linear nondisplaced trabecular fracture extending through that area likely representing a small nondisplaced insufficiency    fracture. There is also arthropathy of that joint.      4.  Again seen marrow edema involving the cuneiforms and the adjacent metatarsals likely representing osteoarthritis.      5.  Osteoarthritis of the tibiotalar joint with chronic osteochondral injury of the lateral aspect of the talar dome. There is also osteoarthritis of the subtalar, talonavicular and calcaneocuboid articulations.      6.  Cellulitis involving the hindfoot most prominently medially.      7.  Prominent tenosynovitis of the tibialis posterior tendon and to a lesser degree the flexor digitorum longus and flexor hallucis tendons. This underlies the area of soft tissue ulceration and could potentially represent infectious tenosynovitis. This    is new from comparison.      8.  Pes planus and valgus deformity of the hindfoot.      CT-EXTREMITY, LOWER WITH LEFT   Final Result      1.  No evidence of acute osteomyelitis of the left lower extremity.      2.  No evidence of deep abscess formation in the subcutaneous tissues.      US-EXTREMITY VENOUS LOWER UNILAT LEFT   Final Result      DX-TIBIA AND FIBULA LEFT   Final Result      1.  Diffuse  soft tissue swelling, edema and/or cellulitis.   2.  No acute osseous abnormality.   3.  Osteopenia.      DX-FOOT-2- LEFT   Final Result      Severe osteopenia and flat foot deformity without evidence of acute displaced fracture      No definite radiographic evidence of osteomyelitis.      CT-HEAD W/O   Final Result      Head CT without contrast within normal limits. No evidence of acute cerebral infarction, hemorrhage or mass lesion.         DX-CHEST-PORTABLE (1 VIEW)   Final Result      No acute cardiac or pulmonary abnormalities are identified.             Assessment/Plan  * Sepsis (HCC)- (present on admission)  Assessment & Plan  Sepsis secondary to LLE tenosynovitis, group A strep  bacteremia  Continue cefazolin cefazolin  Duplex negative for DVT x-rays negative for acute bony pathology  Patient evaluated by orthopedics with nonsurgical management recommended and outpatient follow-up with Dr. Pryor  Continue wound care  Evaluated by ID      S/p BKA for source control    Vitamin D deficiency  Assessment & Plan  Will provide 50 K of ergocaliferol    Hypocalcemia  Assessment & Plan  On oral supplement    3/11 calcium 6.9, ionized calcium 1.1    Continue monitor    Immunocompromised state due to drug therapy (HCC)  Assessment & Plan  Has been maintained on Humira and intermittently on prednisone     Bacteremia  Assessment & Plan  Group A strep bacteremia  Repeated culture 2/28 negative    ID following    3/12 continue IV Ancef    Tenosynovitis of left lower leg  Assessment & Plan  MRI showed tenosynovitis of the tibialis posterior tendon, flexor digitorum longus and hallucis,     S/p left BKA 3/9,  Knee immobilizer to prevent flexion contracture    I discussed with ID, continue antibiotics to complete a 7 days course, okay to transition Ancef to Augmentin 875 mg twice daily    3/12 S/p left BKA 3/9, NWB LLE  Needs dressing change every other day, was changed yesterday, required IV Dilaudid  Sutures removal in  14 to 21 days postoperation    Yeast dermatitis  Assessment & Plan  Started on Diflucan and topical clotrimazole  Skin care    Electrolyte abnormality  Assessment & Plan  Hypokalemia  Hypomagnesemia  Hypophosphatemia  Hypocalcemia    Likely component of refeeding syndrome patient started on thiamine supplements    Replete with K-Phos and IV magnesium sulfate   Start calcium citrate supplements   Monitor electrolytes    Hyponatremia  Assessment & Plan  3/10 Sodium 127, NS x 1L, advised to limit free water intake  monitor      Thrombocytopenia (HCC)  Assessment & Plan  Platelet counts 85K likely reactive secondary to sepsis  Monitor CBC        Hypoglycemia  Assessment & Plan  Likely secondary to poor intake  Hypoglycemia protocol  Continue D5 LR infusion overnight and if p.o. intake remains stable will DC in a.m.  Monitor CBGs    Ulcer of both feet (HCC)- (present on admission)  Assessment & Plan  Wound care  CT negative for abscess  MRI concerning for osteomyelitis patient evaluated by LPS continue wound care  Continue Ancef  Discussed with ID Dr. Galdamez recommends transitioning to p.o. Zyvox on discharge through 3/9/2023 followed by p.o. amoxicillin 1 g twice daily for 3 to 4 weeks and outpatient follow-up with ID and orthopedics  MRI performed of the left knee through foot yesterday with new findings of tenosynovitis which may be the explanation for his persistent elevated temperatures and mild leukocytosis.  ID following and will cont on IV abx for now     3/10 S/p left BKA 3/9 Dr Chambers  PT and OT has evaluated and  recs post-acute   Physiatry has evaluate the patient, stated that patient is a good candidate for acute rehab    Opioid dependence (HCC)- (present on admission)  Assessment & Plan  With narcotic dependence    Patient reported that he uses contin 30mg bid and morphine 30 mg every 6 hours. However, I confirmed with pharmacy, it appears that his home dose is contin 15mg bid and morphine 15 mg every  6 hours    He has been following with Pain Dr.    The dose were increased to contin 30mg bid and morphine 30 mg every 6 hours per his request and given his acute issuses    History of immunosuppressive therapy- (present on admission)  Assessment & Plan  Home biweekly Humira and intermittent steroids        Rheumatoid arthritis (HCC)- (present on admission)  Assessment & Plan  Has been maintained on Humira and intermittently on prednisone although no recent steroid use  Continue to hold Humira  Cortisol level 45    tapered off hydrocortisone    3/12 Reported a severe rheumatoid arthritis plan, May consider to resume RA medications since the infection has been controlled.     History of bleeding ulcers  Assessment & Plan  Continue prilosec  Patient declines pharmacologic DVT prophylaxis discussed RBA         VTE prophylaxis: SCDs/TEDs

## 2023-03-14 NOTE — DISCHARGE PLANNING
In rounds today, discussed pt's use/need for IV Dilaudid. He has informed MD that he received IV Dilaudid in Rehab last time. MD requested CM to clarify this with Rehab. [facility] and was informed that pt will only be receiving PO pain meds while in rehab. CM informed MD. Therapy communicated that pt is not really anticipating in therapy to be appropriate for rehab and was asking if CM was considering SNF. CM will obtain SNF choices. Interdisciplinary rounds with all disciplines aware of pt's dc plan.

## 2023-03-14 NOTE — DISCHARGE PLANNING
CM visited with pt. CM informed pt that a conversation with [facility] had taken place and it was confirmed that pt will have to be off IV pain medication. Pt at first stated that he had received it the last time. CM had no explanation except policies do change. Pt was understanding. He asked if his current po narcotic could be increased and if so, he could do better. He really wants to go to Rehab. CM offered taking this information to the MD.   CM asked pt to make SNF choices as at this time, pt's progress with therapy is questionable in meeting acute rehab requirements. Pt verbalizes he will work as he really wants to go to rehab. He took the choice list for SNF and will wait for his wife to help advise. He verbalizes understanding that CM will need to have a second plan for discharge if rehab declines.   CM will see if choices have been made in the morning as pt's wife was not present.   CM at rounds, informed Dr. Valencia of pt's request.

## 2023-03-14 NOTE — CARE PLAN
The patient is Stable - Low risk of patient condition declining or worsening    Shift Goals  Clinical Goals: pain control, mobility, increased strenth, safety, skin integrity, dressing change, Q6 blood sugar checks, therapy eval  Patient Goals: pain control, resume RA medication, comfort, rest  Family Goals: not present at bedside    Progress made toward(s) clinical / shift goals:      Problem: Skin Integrity  Goal: Skin integrity is maintained or improved  Outcome: Progressing  Note: Pt able to reposition self in bed, [id number], pillows to float heels, frequent moisture/incontinent checks, dressing changes per order.      Problem: Fall Risk  Goal: Patient will remain free from falls  Outcome: Progressing  Note: Bed is locked and in lowest position, treaded socks on, bed alarm on, DME out of sight, call light and belongings within reach, pt calls appropriately for assistance, hourly rounding in place.

## 2023-03-14 NOTE — CARE PLAN
The patient is Stable - Low risk of patient condition declining or worsening    Shift Goals  Clinical Goals: Pain mangement, safety, ambulate. blood sugar checks, MAP above 60  Patient Goals: Pain control, feel better, D/C  Family Goals: No family present    Problem: Pain - Standard  Goal: Alleviation of pain or a reduction in pain to the patient’s comfort goal  Outcome: Progressing  Note: Educated patient on the use of 1-10 pain scale and use of pain descriptors. Administered pain medication when needed per MAR. Non-pharmacological methods for pain control in place such as rest, repositioning, and enforcing a calm and conductive environment.      Problem: Skin Integrity  Goal: Skin integrity is maintained or improved  Outcome: Progressing  Note: 4 eyes assessment complete    Preventative measures in place:  Q 2 hour turns, pillows in place for support and cushioning, stump floated, ensuring medical devices/tubing are not under patient, repositioning medical equipment q 2 hours, mepilex in place, pressure redistribution mattress, mobility as tolerated, skin assessed under bony prominences and high pressure point areas,moisturizer.              Problem: Fall Risk  Goal: Patient will remain free from falls  Outcome: Progressing  Note: Bed in lowest and locked position, call light is within reach, bed alarm is on, treaded socks in place. Patient oriented to room, plan of care and educated to use call light for assistance. Patient educated to not get out of bed without staff present.

## 2023-03-14 NOTE — PROGRESS NOTES
Hospital Medicine Daily Progress Note    Date of Service  3/13/2023    Chief Complaint  Richard Hubbard II is a 47 y.o. male admitted 2/28/2023 with cellulitis and sepsis    Hospital Course  Richard Hubbard II is a 47 y.o. male who presented 2/28/2023 with history of rheumatoid arthritis on Humira with history of planovalgus deformities and foot ulcers with recent MRI concerning for osteomyelitis of the left navicular bone presented to the emergency department for evaluation of confusion and fever of 103 associated with hypoglycemia and worsening redness of the lower extremities more so on the left lower extremity.  He has a history of chronic pain syndrome with narcotic dependence.  The patient is not diabetic and has not had any prior history of hypoglycemia.  According to the wife he has been in the past on prednisone for rheumatoid arthritis flares but has not taken any over the past month.  No headache no loss of consciousness.  Patient was noted to be hypotensive and hypoglycemic received dextrose infusion antibiotics and fluids resuscitation and admitted to IMCU.  Blood cultures grew group A strep    MRI performed of the left knee through foot yesterday with new findings of extended tenosynovitis     Interval Problem Update  Patient was seen and examined at bedside  Reported overall joint pain due to arthritis    S/p left BKA 3/9, NWB LLE  Needs dressing change every other day, was changed yesterday, required IV Dilaudid  Sutures removal in 14 to 21 days postoperation    I discussed with ID, continue antibiotics to complete a 7 days course, okay to transition Ancef to Augmentin 875 mg twice daily at discharge    Sodium 127  WBC 20> 8    May consider to resume RA medications since the infection has been controlled.     PMR consulted    I have discussed this patient's plan of care and discharge plan at IDT rounds today with Case Management, Nursing, Nursing leadership, and other members of the IDT  team.    Consultants/Specialty  critical care    Code Status  Full Code    Disposition  Patient is not medically cleared for discharge.   Anticipate discharge to to home with close outpatient follow-up.  I have placed the appropriate orders for post-discharge needs.    Review of Systems  Review of Systems   Constitutional:  Positive for malaise/fatigue. Negative for fever.   Respiratory:  Negative for shortness of breath.    Cardiovascular:  Negative for chest pain, palpitations and orthopnea.   Genitourinary:  Negative for dysuria, frequency and urgency.   Musculoskeletal:  Positive for joint pain.   Skin:  Positive for rash.   All other systems reviewed and are negative.     Physical Exam  Temp:  [36.8 °C (98.2 °F)-37.3 °C (99.1 °F)] 37.3 °C (99.1 °F)  Pulse:  [77-97] 87  Resp:  [16-18] 16  BP: ()/(60-74) 108/74  SpO2:  [93 %-99 %] 99 %    Physical Exam  Vitals and nursing note reviewed.   Constitutional:       Appearance: He is well-developed. He is not diaphoretic.   HENT:      Head: Normocephalic and atraumatic.      Mouth/Throat:      Pharynx: No oropharyngeal exudate.   Eyes:      General: No scleral icterus.        Right eye: No discharge.         Left eye: No discharge.      Pupils: Pupils are equal, round, and reactive to light.   Neck:      Vascular: No JVD.      Trachea: No tracheal deviation.   Cardiovascular:      Rate and Rhythm: Normal rate and regular rhythm.      Heart sounds: No murmur heard.    No friction rub. No gallop.   Pulmonary:      Effort: Pulmonary effort is normal. No respiratory distress.      Breath sounds: Normal breath sounds. No stridor. No wheezing.   Chest:      Chest wall: No tenderness.   Abdominal:      General: There is no distension.      Palpations: Abdomen is soft.      Tenderness: There is no abdominal tenderness. There is no rebound.      Hernia: A hernia (Soft and reducible) is present.   Musculoskeletal:         General: Swelling and tenderness present.       Cervical back: Neck supple.   Skin:     General: Skin is warm.      Findings: Erythema present.      Nails: There is no clubbing.      Comments: Left scrotal perineal rash    Left lower extremity erythema improved   Neurological:      Mental Status: He is alert and oriented to person, place, and time.      Cranial Nerves: No cranial nerve deficit.      Motor: No weakness or abnormal muscle tone.   Psychiatric:         Behavior: Behavior normal.       Fluids    Intake/Output Summary (Last 24 hours) at 3/13/2023 1723  Last data filed at 3/13/2023 1533  Gross per 24 hour   Intake --   Output 6525 ml   Net -6525 ml       Laboratory  Recent Labs     03/11/23 0426 03/12/23 0136 03/13/23  0441   WBC 11.2* 9.2 8.4   RBC 3.02* 3.14* 3.35*   HEMOGLOBIN 8.8* 9.0* 9.8*   HEMATOCRIT 27.3* 27.6* 30.2*   MCV 90.4 87.9 90.1   MCH 29.1 28.7 29.3   MCHC 32.2* 32.6* 32.5*   RDW 48.8 48.1 48.0   PLATELETCT 289 307 340   MPV 9.6 9.3 9.1     Recent Labs     03/11/23 0426 03/11/23  1919 03/12/23 0136 03/13/23  0441   SODIUM 130*  --  131* 127*   POTASSIUM 4.6  --  5.1 4.9   CHLORIDE 103  --  101 95*   CO2 20  --  24 26   GLUCOSE 85 83 83 85   BUN 10  --  9 8   CREATININE 0.42*  --  0.50 0.43*   CALCIUM 6.9*  --  7.1* 7.5*                     Imaging  IR-US GUIDED PIV   Final Result    Ultrasound-guided PERIPHERAL IV INSERTION performed by    qualified nursing staff as above.      [id number] & W/O LEFT   Final Result      1.  No evidence of left tibial or fibular osteomyelitis or fracture.      2.  Diffuse cellulitis      3.  Atrophy of the muscles of the lower leg with edema and patchy enhancement consistent with myositis. This involves the tibialis posterior muscle to the greatest degree.      4.  Some fluid seen superficial to the medial head of the gastrocnemius muscle. There is no evidence of rim enhancement to suggest presence of abscess.      MR-FOOT-WITH & W/O LEFT   Final Result      1.  Again seen marrow edema  involving the medial aspect of the navicular and the adjacent first cuneiform with some enhancement of that area with overlying soft tissue ulceration and some fluid within the overlying soft tissues consistent with    osteomyelitis versus reactive change. This is unchanged from comparison.      2.  Marrow edema with small nondisplaced trabecular fracture involving the first metatarsal head with enhancement of that area. Differential diagnosis includes nondisplaced stress/insufficiency fracture versus osteomyelitis.      3.  Again seen marrow edema involving the base of the second metatarsal with some abnormal enhancement with a small possible linear nondisplaced trabecular fracture extending through that area likely representing a small nondisplaced insufficiency    fracture. There is also arthropathy of that joint.      4.  Again seen marrow edema involving the cuneiforms and the adjacent metatarsals likely representing osteoarthritis.      5.  Osteoarthritis of the tibiotalar joint with chronic osteochondral injury of the lateral aspect of the talar dome. There is also osteoarthritis of the subtalar, talonavicular and calcaneocuboid articulations.      6.  Cellulitis involving the hindfoot most prominently medially.      7.  Prominent tenosynovitis of the tibialis posterior tendon and to a lesser degree the flexor digitorum longus and flexor hallucis tendons. This underlies the area of soft tissue ulceration and could potentially represent infectious tenosynovitis. This    is new from comparison.      8.  Pes planus and valgus deformity of the hindfoot.      CT-EXTREMITY, LOWER WITH LEFT   Final Result      1.  No evidence of acute osteomyelitis of the left lower extremity.      2.  No evidence of deep abscess formation in the subcutaneous tissues.      US-EXTREMITY VENOUS LOWER UNILAT LEFT   Final Result      DX-TIBIA AND FIBULA LEFT   Final Result      1.  Diffuse soft tissue swelling, edema and/or cellulitis.    2.  No acute osseous abnormality.   3.  Osteopenia.      DX-FOOT-2- LEFT   Final Result      Severe osteopenia and flat foot deformity without evidence of acute displaced fracture      No definite radiographic evidence of osteomyelitis.      CT-HEAD W/O   Final Result      Head CT without contrast within normal limits. No evidence of acute cerebral infarction, hemorrhage or mass lesion.         DX-CHEST-PORTABLE (1 VIEW)   Final Result      No acute cardiac or pulmonary abnormalities are identified.             Assessment/Plan  * Sepsis (HCC)- (present on admission)  Assessment & Plan  Sepsis secondary to LLE tenosynovitis, group A strep  bacteremia  Continue cefazolin cefazolin  Duplex negative for DVT x-rays negative for acute bony pathology  Patient evaluated by orthopedics with nonsurgical management recommended and outpatient follow-up with Dr. Pryor  Continue wound care  Evaluated by ID      S/p BKA for source control    Bacteremia  Assessment & Plan  Group A strep bacteremia  Repeated culture 2/28 negative    ID following    3/12 continue IV Ancef    Tenosynovitis of left lower leg  Assessment & Plan  MRI showed tenosynovitis of the tibialis posterior tendon, flexor digitorum longus and hallucis,     S/p left BKA 3/9,  Knee immobilizer to prevent flexion contracture    I discussed with ID, continue antibiotics to complete a 7 days course, okay to transition Ancef to Augmentin 875 mg twice daily    3/12 S/p left BKA 3/9, NWB LLE  Needs dressing change every other day, was changed yesterday, required IV Dilaudid  Sutures removal in 14 to 21 days postoperation    Hypocalcemia  Assessment & Plan  On oral supplement    3/11 calcium 6.9, ionized calcium 1.1    Continue monitor    Immunocompromised state due to drug therapy (HCC)  Assessment & Plan  Has been maintained on Humira and intermittently on prednisone     Yeast dermatitis  Assessment & Plan  Started on Diflucan and topical clotrimazole  Skin  care    Electrolyte abnormality  Assessment & Plan  Hypokalemia  Hypomagnesemia  Hypophosphatemia  Hypocalcemia    Likely component of refeeding syndrome patient started on thiamine supplements    Replete with K-Phos and IV magnesium sulfate   Start calcium citrate supplements   Monitor electrolytes    Hyponatremia  Assessment & Plan  3/10 Sodium 127, NS x 1L, advised to limit free water intake  monitor      Thrombocytopenia (HCC)  Assessment & Plan  Platelet counts 85K likely reactive secondary to sepsis  Monitor CBC        Hypoglycemia  Assessment & Plan  Likely secondary to poor intake  Hypoglycemia protocol  Continue D5 LR infusion overnight and if p.o. intake remains stable will DC in a.m.  Monitor CBGs    Ulcer of both feet (HCC)- (present on admission)  Assessment & Plan  Wound care  CT negative for abscess  MRI concerning for osteomyelitis patient evaluated by LPS continue wound care  Continue Ancef  Discussed with ID Dr. Galdamez recommends transitioning to p.o. Zyvox on discharge through 3/9/2023 followed by p.o. amoxicillin 1 g twice daily for 3 to 4 weeks and outpatient follow-up with ID and orthopedics  MRI performed of the left knee through foot yesterday with new findings of tenosynovitis which may be the explanation for his persistent elevated temperatures and mild leukocytosis.  ID following and will cont on IV abx for now     3/10 S/p left BKA 3/9 Dr Chambers    Opioid dependence ([facility])- (present on admission)  Assessment & Plan  With narcotic dependence    Patient reported that he uses contin 30mg bid and morphine 30 mg every 6 hours. However, I confirmed with pharmacy, it appears that his home dose is contin 15mg bid and morphine 15 mg every 6 hours    He has been following with Pain .    The dose were increased to contin 30mg bid and morphine 30 mg every 6 hours per his request and given his acute issuses    History of immunosuppressive therapy- (present on admission)  Assessment & Plan  Home  biweekly Humira and intermittent steroids        Rheumatoid arthritis (HCC)- (present on admission)  Assessment & Plan  Has been maintained on Humira and intermittently on prednisone although no recent steroid use  Continue to hold Humira  Cortisol level 45    tapered off hydrocortisone    3/12 Reported a severe rheumatoid arthritis plan, May consider to resume RA medications since the infection has been controlled.     History of bleeding ulcers  Assessment & Plan  Continue prilosec  Patient declines pharmacologic DVT prophylaxis discussed RBA       Please note that this dictation was created using voice recognition software. I have made every reasonable attempt to correct obvious errors, but I expect that there are errors of grammar and possibly context that I did not discover before finalizing the note.    VTE prophylaxis: SCDs/TEDsNote patient reports history of severe gastrointestinal bleed with any anticoagulant per patient request no anticoagulant for DVT prophylaxis ordere  I have performed a physical exam and reviewed and updated ROS and Plan today (3/13/2023). In review of yesterday's note (3/12/2023), there are no changes except as documented above.

## 2023-03-15 NOTE — CARE PLAN
The patient is Watcher - Medium risk of patient condition declining or worsening    Shift Goals  Clinical Goals: pain control, skin integrity, care coordination  Patient Goals: bed mobility,discharge planning  Family Goals: not present    Progress made toward(s) clinical / shift goals:  yes    Problem: Pain - Standard  Goal: Alleviation of pain or a reduction in pain to the patient’s comfort goal  Outcome: Progressing     Problem: Hemodynamics  Goal: Patient's hemodynamics, fluid balance and neurologic status will be stable or improve  Outcome: Progressing     Problem: Fluid Volume  Goal: Fluid volume balance will be maintained  Outcome: Progressing     Problem: Urinary - Renal Perfusion  Goal: Ability to achieve and maintain adequate renal perfusion and functioning will improve  Outcome: Progressing     Problem: Respiratory  Goal: Patient will achieve/maintain optimum respiratory ventilation and gas exchange  Outcome: Progressing     Problem: Physical Regulation  Goal: Diagnostic test results will improve  Outcome: Progressing  Goal: Signs and symptoms of infection will decrease  Outcome: Progressing     Problem: Skin Integrity  Goal: Skin integrity is maintained or improved  Outcome: Progressing     Problem: Fall Risk  Goal: Patient will remain free from falls  Outcome: Progressing     Problem: Knowledge Deficit - Standard  Goal: Patient and family/care givers will demonstrate understanding of plan of care, disease process/condition, diagnostic tests and medications  Outcome: Progressing     Problem: Self Care  Goal: Patient will have the ability to perform ADLs independently or with assistance (bathe, groom, dress, toilet and feed)  Outcome: Progressing     Problem: Mobility  Goal: Patient's capacity to carry out activities will improve  Outcome: Progressing  Flowsheets (Taken 3/15/2023 1402)  Mobility:   Encouraged mobilization per interdisciplinary team recommendations   Monitored for signs of activity  intolerance   Provided assistive devices   Provided rest periods between activities   Administered pain management to allow progressive mobilization   Collaborated with PT/OT

## 2023-03-15 NOTE — DISCHARGE PLANNING
Received a call from pt's insurance inquiring about LTAC. [facility] states denial as pt is most appropriate for acute rehab. Araceli from [facility] discussed pt being on IV dilaudid still and how this alone is not appropriate for LTAC. We discussed pt's history and need to off the IV meds for any safe discharge plan. Araceli suggested Pharmacy involvement and possibly consulting Palliative Care for Pain management. CM states she will discuss with MD about Palliative Care.

## 2023-03-15 NOTE — CARE PLAN
The patient is Stable - Low risk of patient condition declining or worsening    Shift Goals  Clinical Goals: pain control, skin integrity, abx tx, therapy, mobility, safety, urine sample  Patient Goals: pain control, comfort, rest, communication  Family Goals: pain control, comfort, rest    Progress made toward(s) clinical / shift goals:      Problem: Pain - Standard  Goal: Alleviation of pain or a reduction in pain to the patient’s comfort goal  Outcome: Progressing  Note: Pt states pain is being managed by current medication regimen. Medications administered per MAR, ice pack applied, pillows for comfort and repositioning.      Problem: Skin Integrity  Goal: Skin integrity is maintained or improved  Outcome: Progressing  Note: Pt able to reposition self in bed, [id number], pillows to float heels, frequent moisture/incontinent checks, dressing changes per order.

## 2023-03-15 NOTE — PROGRESS NOTES
Hospital Medicine Daily Progress Note    Date of Service  3/15/2023    Chief Complaint  Richard Hubbard II is a 47 y.o. male admitted 2/28/2023 with cellulitis and sepsis    Hospital Course  RThis is a 47-year-old male with a past medical history significant for rheumatoid arthritis on Humira, history of planovalgus deformities and foot ulcers with recent MRI concerning for osteomyelitis of the left navicular bone presented ER on 2/28 with altered mental status, fever at 103 and hypoglycemia, worsening erythema of the left lower extremity.  Patient was then being admitted for sepsis secondary to left lower extremity cellulitis.    Considering patient being hypotensive and hypoglycemic patient was admitted to [facility].  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  ID recommended completing antibiotics for 7 days to treat residual cellulitis chills 3/15/2023.  Patient can be discharged on Augmentin 875/121 tablet p.o. twice daily.     PT/OT evaluate the patient medical and postacute, physiatry has been evaluating the patient      Interval events:  -- No acute events overnight, vital signs been stable, patient is alert, awake, answering questions appropriately.  He does have a left BKA done by Dr. Sanchez on 3/9/2023  -- Continue IV antibiotics till 3/15/2023 as per ID recommendation  -- PT/OT has evaluate the patient, recommend postacute; physiatry following and stated that patient is a candidate for inpatient rehab; awaiting insurance Auth   -- Na at 129  -- Hemoglobin 9.4, monitor, vitamin D10.1, will provide ergocalciferol    3/15:  -- No acute events overnight, medicine has been stable, patient is alert, awake, answering questions appropriately  -Patient noted to have hyponatremia with a sodium of 127 likely hypovolemic hyponatremia, repeat BMP at  a.m.-  --Gabapentin has been increased to 600 3 times daily, added muscle relaxant.  -- PT/OT evaluate the patient medical and postacute, patient will be going to rehab, awaiting insurance auth       I have discussed this patient's plan of care and discharge plan at IDT rounds today with Case Management, Nursing, Nursing leadership, and other members of the IDT team.    Consultants/Specialty  critical care    Code Status  Full Code    Disposition  Patient is medically cleared for discharge., awaiting insurance auth  Anticipate discharge to rehab .  I have placed the appropriate orders for post-discharge needs.    Review of Systems  Review of Systems   Constitutional:  Positive for malaise/fatigue. Negative for fever.   HENT:  Negative for congestion and sore throat.    Eyes:  Negative for blurred vision.   Respiratory:  Negative for shortness of breath.    Cardiovascular:  Negative for chest pain and orthopnea.   Gastrointestinal:  Negative for diarrhea, heartburn and vomiting.   Genitourinary:  Negative for dysuria.   Musculoskeletal:  Positive for joint pain.   Skin:  Negative for rash.   Neurological:  Negative for focal weakness and headaches.   Psychiatric/Behavioral:  The patient is nervous/anxious.    All other systems reviewed and are negative.     Physical Exam  Temp:  [36.5 °C (97.7 °F)-36.7 °C (98.1 °F)] 36.7 °C (98.1 °F)  Pulse:  [75-97] 94  Resp:  [15-18] 16  BP: ()/(63-71) 99/63  SpO2:  [93 %-98 %] 96 %    Physical Exam  Vitals and nursing note reviewed.   Constitutional:       Appearance: Normal appearance. He is well-developed. He is not diaphoretic.   HENT:      Head: Normocephalic and atraumatic.   Eyes:      Extraocular Movements: Extraocular movements intact.      Pupils: Pupils are equal, round, and reactive to light.   Neck:      Vascular: No JVD.      Trachea: No tracheal deviation.   Cardiovascular:      Rate and Rhythm: Normal rate and regular rhythm.   Pulmonary:      Effort:  Pulmonary effort is normal. No respiratory distress.      Breath sounds: No stridor.   Abdominal:      General: There is no distension.      Palpations: Abdomen is soft.      Hernia: A hernia (Soft and reducible) is present.   Musculoskeletal:         General: Swelling and tenderness present.      Cervical back: Neck supple.   Skin:     General: Skin is warm.      Findings: Erythema present.      Nails: There is no clubbing.   Neurological:      Mental Status: He is alert and oriented to person, place, and time.      Cranial Nerves: No cranial nerve deficit.      Motor: No abnormal muscle tone.   Psychiatric:         Behavior: Behavior normal.       Fluids    Intake/Output Summary (Last 24 hours) at 3/15/2023 1534  Last data filed at 3/15/2023 0839  Gross per 24 hour   Intake --   Output 2520 ml   Net -2520 ml         Laboratory  Recent Labs     03/13/23  0441 03/14/23  0051 03/15/23  0047   WBC 8.4 8.2 8.4   RBC 3.35* 3.22* 3.15*   HEMOGLOBIN 9.8* 9.4* 9.1*   HEMATOCRIT 30.2* 28.9* 28.1*   MCV 90.1 89.8 89.2   MCH 29.3 29.2 28.9   MCHC 32.5* 32.5* 32.4*   RDW 48.0 47.8 47.6   PLATELETCT 340 348 356   MPV 9.1 8.9* 8.7*       Recent Labs     03/14/23  0051 03/15/23  0047 03/15/23  1046   SODIUM 129* 126* 127*   POTASSIUM 4.1 4.4 4.3   CHLORIDE 96 93* 95*   CO2 24 25 25   GLUCOSE 89 94 105*   BUN 7* 7* 6*   CREATININE 0.52 0.49* 0.42*   CALCIUM 7.7* 7.7* 7.8*                       Imaging  IR-US GUIDED PIV   Final Result    Ultrasound-guided PERIPHERAL IV INSERTION performed by    qualified nursing staff as above.      [id number] & W/O LEFT   Final Result      1.  No evidence of left tibial or fibular osteomyelitis or fracture.      2.  Diffuse cellulitis      3.  Atrophy of the muscles of the lower leg with edema and patchy enhancement consistent with myositis. This involves the tibialis posterior muscle to the greatest degree.      4.  Some fluid seen superficial to the medial head of the gastrocnemius  muscle. There is no evidence of rim enhancement to suggest presence of abscess.      MR-FOOT-WITH & W/O LEFT   Final Result      1.  Again seen marrow edema involving the medial aspect of the navicular and the adjacent first cuneiform with some enhancement of that area with overlying soft tissue ulceration and some fluid within the overlying soft tissues consistent with    osteomyelitis versus reactive change. This is unchanged from comparison.      2.  Marrow edema with small nondisplaced trabecular fracture involving the first metatarsal head with enhancement of that area. Differential diagnosis includes nondisplaced stress/insufficiency fracture versus osteomyelitis.      3.  Again seen marrow edema involving the base of the second metatarsal with some abnormal enhancement with a small possible linear nondisplaced trabecular fracture extending through that area likely representing a small nondisplaced insufficiency    fracture. There is also arthropathy of that joint.      4.  Again seen marrow edema involving the cuneiforms and the adjacent metatarsals likely representing osteoarthritis.      5.  Osteoarthritis of the tibiotalar joint with chronic osteochondral injury of the lateral aspect of the talar dome. There is also osteoarthritis of the subtalar, talonavicular and calcaneocuboid articulations.      6.  Cellulitis involving the hindfoot most prominently medially.      7.  Prominent tenosynovitis of the tibialis posterior tendon and to a lesser degree the flexor digitorum longus and flexor hallucis tendons. This underlies the area of soft tissue ulceration and could potentially represent infectious tenosynovitis. This    is new from comparison.      8.  Pes planus and valgus deformity of the hindfoot.      CT-EXTREMITY, LOWER WITH LEFT   Final Result      1.  No evidence of acute osteomyelitis of the left lower extremity.      2.  No evidence of deep abscess formation in the subcutaneous tissues.       US-EXTREMITY VENOUS LOWER UNILAT LEFT   Final Result      DX-TIBIA AND FIBULA LEFT   Final Result      1.  Diffuse soft tissue swelling, edema and/or cellulitis.   2.  No acute osseous abnormality.   3.  Osteopenia.      DX-FOOT-2- LEFT   Final Result      Severe osteopenia and flat foot deformity without evidence of acute displaced fracture      No definite radiographic evidence of osteomyelitis.      CT-HEAD W/O   Final Result      Head CT without contrast within normal limits. No evidence of acute cerebral infarction, hemorrhage or mass lesion.         DX-CHEST-PORTABLE (1 VIEW)   Final Result      No acute cardiac or pulmonary abnormalities are identified.             Assessment/Plan  * Sepsis (HCC)- (present on admission)  Assessment & Plan  Sepsis secondary to LLE tenosynovitis, group A strep  bacteremia  Continue cefazolin cefazolin  Duplex negative for DVT x-rays negative for acute bony pathology  Patient evaluated by orthopedics with nonsurgical management recommended and outpatient follow-up with Dr. Pryor  Continue wound care  Evaluated by ID      S/p BKA for source control    Vitamin D deficiency  Assessment & Plan  Will provide 50 K of ergocaliferol    Hypocalcemia  Assessment & Plan  On oral supplement    3/11 calcium 6.9, ionized calcium 1.1    Continue monitor    Immunocompromised state due to drug therapy (HCC)  Assessment & Plan  Has been maintained on Humira and intermittently on prednisone     Bacteremia  Assessment & Plan  Group A strep bacteremia  Repeated culture 2/28 negative    ID following    3/12 continue IV Ancef; will continue     Tenosynovitis of left lower leg  Assessment & Plan  MRI showed tenosynovitis of the tibialis posterior tendon, flexor digitorum longus and hallucis,     S/p left BKA 3/9,  Knee immobilizer to prevent flexion contracture    I discussed with ID, continue antibiotics to complete a 7 days course, okay to transition Ancef to Augmentin 875 mg twice  daily    3/12 S/p left BKA 3/9, NWB LLE  Needs dressing change every other day, was changed yesterday, required IV Dilaudid  Sutures removal in 14 to 21 days postoperation    Yeast dermatitis  Assessment & Plan  Started on Diflucan and topical clotrimazole  Skin care    Electrolyte abnormality  Assessment & Plan  Hypokalemia  Hypomagnesemia  Hypophosphatemia  Hypocalcemia    Likely component of refeeding syndrome patient started on thiamine supplements  Replele electrolytes as needed    Hyponatremia  Assessment & Plan  Likely 2/2 hypovolumic hyponatremia   Repeat bmp at am       Thrombocytopenia (HCC)  Assessment & Plan  Platelet counts 85K likely reactive secondary to sepsis  Now reslved , platelet at 356      Hypoglycemia  Assessment & Plan  Likely secondary to poor intake  Hypoglycemia protocol   --- encourage good po intake    Ulcer of both feet (HCC)- (present on admission)  Assessment & Plan  Wound care  CT negative for abscess  MRI concerning for osteomyelitis patient evaluated by LPS continue wound care  Continue Ancef  Discussed with ID Dr. Galdamez recommends transitioning to p.o. Zyvox on discharge through 3/9/2023 followed by p.o. amoxicillin 1 g twice daily for 3 to 4 weeks and outpatient follow-up with ID and orthopedics  MRI performed of the left knee through foot yesterday with new findings of tenosynovitis which may be the explanation for his persistent elevated temperatures and mild leukocytosis.  ID following and will cont on IV abx for now     3/10 S/p left BKA 3/9 Dr Chambers  PT and OT has evaluated and  recs post-acute   Physiatry has evaluate the patient, stated that patient is a good candidate for acute rehab    Opioid dependence (HCC)- (present on admission)  Assessment & Plan  With narcotic dependence    Patient reported that he uses contin 30mg bid and morphine 30 mg every 6 hours. However, I confirmed with pharmacy, it appears that his home dose is contin 15mg bid and morphine 15 mg  every 6 hours    He has been following with Pain Dr.    The dose were increased to contin 30mg bid and morphine 30 mg every 6 hours per his request and given his acute issuses    History of immunosuppressive therapy- (present on admission)  Assessment & Plan  Home biweekly Humira and intermittent steroids        Rheumatoid arthritis (HCC)- (present on admission)  Assessment & Plan  Has been maintained on Humira and intermittently on prednisone although no recent steroid use  Continue to hold Humira  Cortisol level 45    tapered off hydrocortisone    3/12 Reported a severe rheumatoid arthritis plan, May consider to resume RA medications since the infection has been controlled.     History of bleeding ulcers  Assessment & Plan  Continue prilosec  Patient declines pharmacologic DVT prophylaxis discussed RBA         VTE prophylaxis: SCDs/TEDs    I have performed a physical exam and reviewed and updated ROS and Plan today (3/15/2023). In review of yesterday's note (3/14/2023), there are no changes except as documented above.

## 2023-03-15 NOTE — DISCHARGE PLANNING
Following for medical clearance, IV dilaudid is a barrier for [facility] acceptance. Discussed with cm yesterday, [facility] will continue to follow.

## 2023-03-15 NOTE — PROGRESS NOTES
Performed Q6 fingerstick, blood sugar is 67. Gave orange juice, patient is not symptomatic. Notified hospitalist.

## 2023-03-15 NOTE — DISCHARGE PLANNING
Collaborated with Dr Dalton who suggested we look into LTAC for pt as this may be a better fit. Davy from [employer] contacted and reviewed chart. She feels pt would be a good fit for [facility]. CM went to visit with pt and asked if he would be ok with [employer] rep to visit with him as the best information would be given from her. He agreed. He would not sign the choice letter yet as he does want to discuss with his wife.   Pt did sign the SNF choice letter that was left with him yesterday.

## 2023-03-16 NOTE — CARE PLAN
The patient is Stable - Low risk of patient condition declining or worsening    Shift Goals  Clinical Goals: pain control, Q6 blood sugar checks, discharge planning, skin integrity, therapy,  Patient Goals: pain control, comfort, rest, discharge planning  Family Goals: not present at bedside    Progress made toward(s) clinical / shift goals:      Problem: Skin Integrity  Goal: Skin integrity is maintained or improved  Outcome: Progressing  Note: Pt able to reposition self in bed with assistance of one, [id number], pillows to float heels, frequent moisture/incontinent checks, dressing changes per order.      Problem: Fall Risk  Goal: Patient will remain free from falls  Outcome: Progressing  Note: Bed is locked and in lowest position, treaded socks on, bed alarm on, DME out of sight, call light and belongings within reach, pt calls appropriately for assistance, hourly rounding in place.

## 2023-03-16 NOTE — PROGRESS NOTES
4 Eyes Skin Assessment Completed by Yancy RN and Ivana RN.     Head WDL  Ears WDL  Nose WDL  Mouth scab  Neck WDL  Breast/Chest Redness/ rash under breast/ on rib cage --> improving  Shoulder Blades Redness/ Blanching  Spine rash/ redness/ blanching --> improving  (R) Arm/Elbow/Hand Redness, Blanching, and Scab --> improving  (L) Arm/Elbow/Hand Redness, Blanching, and Scab  Abdomen Hernia to mid abdomen  Groin Redness, Blanching, and Excoriation --> improving  Scrotum/Coccyx/Buttocks Redness, Blanching, Excoriation, and Moisture Fissure --> improving  (R) Leg Discoloration, scattered scabs, RA deformity  (L) Leg L BKA sx dressing CDI  (R) Heel/Foot/Toe Redness and Blanching RA Deformity, wound with dressing in place  (L) Heel/Foot/Toe N/A BKA           Devices In Places Blood Pressure Cuff, Pulse Ox, and SCD's to RLE,        Interventions In Place Waffle Overlay, Pillows, Barrier Cream, Heels Loaded W/Pillows, and Pressure Redistribution Mattress after requesting switch from STEPHANIE, heel float boots     Possible Skin Injury Yes, already documented     Pictures Uploaded Into Epic Yes, already done  Wound Consult Placed wound following  RN Wound Prevention Protocol Ordered Yes, already done

## 2023-03-16 NOTE — PROGRESS NOTES
"    Physical Medicine and Rehabilitation Consultation  Follow up note         Date of initial consultation: 3/7/2023  Consulting provider: Corie Aranda M.D.  Reason for consultation: assess for acute inpatient rehab appropriateness  LOS: 16 Day(s)    Chief complaint: Left foot osteomyelitis    HPI: Per Dr. Pascual's consult note: \" The patient is a 47 y.o. right hand dominant male with a past medical history of reported arthritis on Humira with deformities and foot ulcers;  who presented on 2/28/2023 10:51 AM with MRI evidence of osteomyelitis of the left navicular bone fever of 103, hypoglycemia, altered mental status and cellulitis.  Patient was seen by orthopedic surgery, LPS and infectious disease.  He is currently on cefazolin 3 times daily through 3/12/2023.  Patient went marked left knee through foot yesterday which found tenosynovitis.  Patient will undergo possible surgical invention on 3/7  Chart review reveals that Mr. Hubbard is a prior patient of [facility], with his most recent admission in July 2020, where he attended rehab following right knee replacement and was successfully discharged home after 11 days.  The patient currently reports nausea, vomiting, chest pain, diarrhea, shortness of breath, 8 out of 5 pain in \"every joint\" most significant in his left shoulder, right wrist, right knee.  Patient has been n.p.o. today and is expected to go to surgery, he is unsure if he will be receiving a BKA or not today.  He understands this is a possibility.  Patient is very weak today and is quite painful.  Patient reports that typically he gets around with a four-wheel walker just fine, takes care of 2 kids, does chores etc.\"    3/16: interval events: patient was taken to the OR on 3/9 for L BKA performed by Dr. Chambers. Blood cultures have been  + for strep A. Patient has been on IV abx, but will be switched to augementin. Patient has been hyponatremic to 126. Chart review indicates history of " gastric bypass IN 2000, and poor absorption. This may impair patient's ability to absorb pain medications.  Patient previously a patient of Dr. Montero IN 2020, but switch to the [facility] office. He does not remember his pain doctor's name during my visit. Patient previously on Lyrica, but not currently on it.     Today patient tells me that his joints are extremely painful, reports that his pain is not located at his BKA site.  Reports that he has most significant pain in his hand joints, elbows, shoulders, knee and right ankle.  Patient has not been started on his Humira due to concern for immune suppression in setting of sepsis.  Denies phantom pain          ROS  Pertinent positives are mentioned in the HPI, all others reviewed and are negative.    Social Hx:  1 SH  2 HASEEB  With: Spouse and 2 children ages 4 and 7.  Spouse works for [employer] as a pediatric psychologist.     Tobacco: Half pack per day  Alcohol: Denies  Drugs: Denies    THERAPY:  Restrictions: Fall risk  PT: Functional mobility   3/3: Max assist sit to stand  3/14 min assist x2 people for sit to stand, mod assist bed mobility, unable to participate functional gait  3/13 unable to participate in functional transfer    OT: ADLs  3/3: Max assist lower body dressing  3/13 total assist lower body dressing, mod assist bed mobility, unable to participate functional sit to stand    SLP:   None    IMAGING:  [id number]-WITH & W/O LEFT   Final Result       1.  No evidence of left tibial or fibular osteomyelitis or fracture.       2.  Diffuse cellulitis       3.  Atrophy of the muscles of the lower leg with edema and patchy enhancement consistent with myositis. This involves the tibialis posterior muscle to the greatest degree.       4.  Some fluid seen superficial to the medial head of the gastrocnemius muscle. There is no evidence of rim enhancement to suggest presence of abscess.       MR-FOOT-WITH & W/O LEFT   Final Result       1.  Again seen  marrow edema involving the medial aspect of the navicular and the adjacent first cuneiform with some enhancement of that area with overlying soft tissue ulceration and some fluid within the overlying soft tissues consistent with    osteomyelitis versus reactive change. This is unchanged from comparison.       2.  Marrow edema with small nondisplaced trabecular fracture involving the first metatarsal head with enhancement of that area. Differential diagnosis includes nondisplaced stress/insufficiency fracture versus osteomyelitis.       3.  Again seen marrow edema involving the base of the second metatarsal with some abnormal enhancement with a small possible linear nondisplaced trabecular fracture extending through that area likely representing a small nondisplaced insufficiency    fracture. There is also arthropathy of that joint.       4.  Again seen marrow edema involving the cuneiforms and the adjacent metatarsals likely representing osteoarthritis.       5.  Osteoarthritis of the tibiotalar joint with chronic osteochondral injury of the lateral aspect of the talar dome. There is also osteoarthritis of the subtalar, talonavicular and calcaneocuboid articulations.       6.  Cellulitis involving the hindfoot most prominently medially.       7.  Prominent tenosynovitis of the tibialis posterior tendon and to a lesser degree the flexor digitorum longus and flexor hallucis tendons. This underlies the area of soft tissue ulceration and could potentially represent infectious tenosynovitis. This    is new from comparison.       8.  Pes planus and valgus deformity of the hindfoot.       CT-EXTREMITY, LOWER WITH LEFT   Final Result       1.  No evidence of acute osteomyelitis of the left lower extremity.       2.  No evidence of deep abscess formation in the subcutaneous tissues.       PROCEDURES:  None    PMH:  Past Medical History:   Diagnosis Date    ADD (attention deficit disorder)     Alcohol abuse     Allergic  rhinitis 5/21/2009    Anemia 09/2019    Anxiety 5/21/2009    Apnea, sleep     Arrhythmia     afib when hospitaized for infection    Back pain 5/21/2009    Bipolar disorder ([facility])     Bleeding ulcer 5/21/2009    Bleeding ulcer 5/21/2009    History of anemia-now on nexium     Bowel habit changes     constipation related to narcotics    Daytime sleepiness     Depression 5/21/2009    Eczema 5/21/2009    GERD (gastroesophageal reflux disease)     Heart burn     on meds    Heart murmur     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    Hemorrhagic disorder (HCC)     hx of bleeding ulcers    History of bleeding ulcers 2/12/2015    Hypertension 05/03/2021    pt states well controlled on meds    Insomnia 5/21/2009    Migraine 5/21/2009    Morning headache     Obesity, morbid ([facility]) 5/21/2009    Osteomyelitis ([facility]) 10/7/2019    Pain 09/2019    Chronic knee and back pain    Pubic ramus fracture ([facility]) 9/28/2019    Rheumatoid arteritis ([facility]) 09/2019    knee, feet right hand    Sleep apnea 5/21/2009    Did not tolerate cpap, does not use it    Snoring        PSH:  Past Surgical History:   Procedure Laterality Date    KNEE AMPUTATION BELOW Left 3/9/2023    Procedure: AMPUTATION, BELOW KNEE;  Surgeon: Wayne Chambers M.D.;  Location: [facility];  Service: Orthopedics    PB TOTAL KNEE ARTHROPLASTY Right 7/22/2020    Procedure: ARTHROPLASTY, KNEE, TOTAL;  Surgeon: Temo Pires M.D.;  Location: [facility];  Service: Orthopedics    TENDON TRANSFER Right 1/22/2020    Procedure: RIGHT DISTAL ULNA RESECTION AND EXTENSOR TENDON TRANSFER;  Surgeon: Marine Rushing M.D.;  Location: [facility];  Service: Orthopedics    OTHER ORTHOPEDIC SURGERY Right 2020    total right knee replacement    IRRIGATION & DEBRIDEMENT ORTHO Left 10/9/2019    Procedure: IRRIGATION AND DEBRIDEMENT, WOUND - PELVIC OSTEOMYELITIS;  Surgeon: You Olivares M.D.;  Location: [facility];  Service: Orthopedics    OTHER  "SURGICAL PROCEDURE  2019    osteomelitis of pelvis cleaned    GASTRIC BYPASS LAPAROSCOPIC  2000    Lucila en y    CHOLECYSTECTOMY  2000    OTHER SURGICAL PROCEDURE      repair of broken penis       FHX:    Family History   Problem Relation Age of Onset    Hypertension Mother     Arthritis Mother     Depression Mother     Cancer Father         bladder    Schizophrenia Father     Cancer Maternal Grandmother         breast    Heart Disease Maternal Grandmother     Psychiatric Illness Other     Stroke Maternal Aunt     Other Neg Hx         no connective tissue disorders or known aortic disease       Medications:  Current Facility-Administered Medications   Medication Dose    methocarbamol (ROBAXIN) tablet 750 mg  750 mg    gabapentin (NEURONTIN) capsule 600 mg  600 mg    vitamin D2 (Ergocalciferol) (Drisdol) capsule 50,000 Units  50,000 Units    carboxymethylcellulose (REFRESH TEARS) 0.5 % ophthalmic drops 1 Drop  1 Drop    morphine ER (MS CONTIN) tablet 30 mg  30 mg    HYDROmorphone (Dilaudid) injection 0.5 mg  0.5 mg    morphine (MS IR) tablet 30 mg  30 mg    calcium citrate (CALCITRATE) tablet 950 mg  950 mg    thiamine (Vitamin B-1) tablet 100 mg  100 mg    zolpidem (AMBIEN) tablet 5 mg  5 mg    nicotine (NICODERM) 7 MG/24HR 7 mg  7 mg    omeprazole (PRILOSEC) capsule 40 mg  40 mg    senna-docusate (PERICOLACE or SENOKOT S) 8.6-50 MG per tablet 2 Tablet  2 Tablet    And    polyethylene glycol/lytes (MIRALAX) PACKET 1 Packet  1 Packet    And    magnesium hydroxide (MILK OF MAGNESIA) suspension 30 mL  30 mL    And    bisacodyl (DULCOLAX) suppository 10 mg  10 mg    lactated ringers infusion (BOLUS)  500 mL    acetaminophen (Tylenol) tablet 650 mg  650 mg    Pharmacy Consult Request ...Pain Management Review 1 Each  1 Each    dextrose 10 % BOLUS 25 g  25 g       Allergies:  Allergies   Allergen Reactions    Benadryl [Altaryl]      \"I get agitated\"    Nsaids      Bleeding, \"I had a bleeding ulcer\"    Phenergan " "[Promethazine Hcl]      \"I get very agitated\"    Penicillins      \"Had some dizziness\"  Tolerated Unasyn 10/2019  Tolerated Zosyn 02/2023         Physical Exam:  Vitals: BP 98/63   Pulse 70   Temp 36.6 °C (97.9 °F) (Temporal)   Resp 17   Ht 1.575 m (5' 2.01\")   Wt 97.3 kg (214 lb 8.1 oz)   SpO2 96%   Gen: NAD, laying comfortably in bed  Head: + Temporal wasting, NC/AT  Eyes/ Nose/ Mouth: PERRLA, moist mucous membranes  Cardio: RRR, good distal perfusion, warm extremities  Pulm: normal respiratory effort, no cyanosis, on room air  Abd: Soft NTND, significant loose skin through patient's arms abdomen and legs  Ext: No peripheral edema. No calf tenderness. No clubbing.  + Left BKA, dressing clean dry and intact  Mood: Patient is tearful during visit expresses frustration with not being able to tolerate therapy, tearful when discussing his pain    Mental status:  A&Ox4 (person, place, date, situation) answers questions appropriately follows commands  Speech: fluent, no aphasia or dysarthria    CRANIAL NERVES:  2,3: visual acuity grossly intact, PERRL  3,4,6: EOMI bilaterally, no nystagmus or diplopia  5: sensation intact to light touch bilaterally and symmetric  7: no facial asymmetry  8: hearing grossly intact      Motor:      Upper Extremity  Myotome R L   Shoulder flexion C5 3/5 3, limited by joint pain/5   Elbow flexion C5 4/5 4/5   Wrist extension C6 4/5 4/5   Elbow extension C7 4/5 4/5   Finger flexion C8 4/5 4/5   Finger abduction T1 4/5 4/5     Lower Extremity Myotome R L   Hip flexion L2 5/5 3/5   Knee extension L3 5/5 3/5   Ankle dorsiflexion L4 4, limited by pain /5 NA   Toe extension L5 4/5 NA   Ankle plantarflexion S1 4/5 NA        Sensory:   intact to light touch through out    Coordination:   intact fine motor with fingers bilaterally        Sensory:   intact to light touch through out    Labs: Reviewed and significant for   Recent Labs     03/14/23  0051 03/15/23  0047   RBC 3.22* 3.15* "   HEMOGLOBIN 9.4* 9.1*   HEMATOCRIT 28.9* 28.1*   PLATELETCT 348 356       Recent Labs     03/14/23  0051 03/15/23  0047 03/15/23  1046   SODIUM 129* 126* 127*   POTASSIUM 4.1 4.4 4.3   CHLORIDE 96 93* 95*   CO2 24 25 25   GLUCOSE 89 94 105*   BUN 7* 7* 6*   CREATININE 0.52 0.49* 0.42*   CALCIUM 7.7* 7.7* 7.8*       Recent Results (from the past 24 hour(s))   Basic Metabolic Panel    Collection Time: 03/15/23 10:46 AM   Result Value Ref Range    Sodium 127 (L) 135 - 145 mmol/L    Potassium 4.3 3.6 - 5.5 mmol/L    Chloride 95 (L) 96 - 112 mmol/L    Co2 25 20 - 33 mmol/L    Glucose 105 (H) 65 - 99 mg/dL    Bun 6 (L) 8 - 22 mg/dL    Creatinine 0.42 (L) 0.50 - 1.40 mg/dL    Calcium 7.8 (L) 8.5 - 10.5 mg/dL    Anion Gap 7.0 7.0 - 16.0   ESTIMATED GFR    Collection Time: 03/15/23 10:46 AM   Result Value Ref Range    GFR (CKD-EPI) 134 >60 mL/min/1.73 m 2   POCT glucose device results    Collection Time: 03/15/23  3:01 PM   Result Value Ref Range    POC Glucose, Blood 67 65 - 99 mg/dL   POCT glucose device results    Collection Time: 03/15/23  5:50 PM   Result Value Ref Range    POC Glucose, Blood 94 65 - 99 mg/dL   POCT glucose device results    Collection Time: 03/16/23 12:12 AM   Result Value Ref Range    POC Glucose, Blood 89 65 - 99 mg/dL   POCT glucose device results    Collection Time: 03/16/23  6:18 AM   Result Value Ref Range    POC Glucose, Blood 78 65 - 99 mg/dL         ASSESSMENT:  Patient is a 47 y.o. male admitted with left foot osteomyelitis now on IV antibiotics, pending surgery    [facility] Code / Diagnosis to Support: 0017.8 - Medically Complex: Medical/Surgical Complications    Rehabilitation: Impaired ADLs and mobility  Patient will need IPR when all surgeries have been completed, and patient is medically cleared and able to tolerate IPR.  [facility], had a positive experience both times, and is wanting to return to continue rehab following this admission    All cases  are subject to administrative review and recommendations may change    Disposition recommendations:  Dispo:  - patient is currently functioning below their level of baseline, recommend post acute rehab  -Currently, patient is not a candidate for inpatient rehab due to his IV pain meds, discussed with Hospitalist   - If patient shows improved endurance with therapy and ability to have pain controlled on oral pain meds patient would be a candidate for IRF level therapy  - Patient is mostly limited by his rheumatoid arthritis pain, he is not limited in function due to his left BKA  - Patient's pain in his shoulders, elbows, hands and right knee need to be controlled in order for patient to improve functionally  - PM&R will continue to follow closely      Medical Complexity:  Left BKA  -Originally presented with left lower extremity osteomyelitis  - Infectious diseases consulted, completed IV antibiotics 3/15  - Orthopedic surgery consulted, patient taken to the OR on 3/9 for left BKA performed by Dr. Chambers   -Left BKA stump pain appears to be controlled, denies phantom pain  - Limited with PT/OT due to significant joint pain due to rheumatoid arthritis  -Recommend attempting slide board transfers, patient is not functional with a sit to stand at this time, patient is significantly limited in his pain in his bilateral shoulders as well as his right knee  - Discussed at length functional goals with patient should be first time primarily at the wheelchair level, patient tearful when discussing need for wheelchair, potentially unrealistic goals for ambulation    Chronic pain  Rheumatoid arthritis  -Previously followed by Dr. Gutierrez for chronic pain after inpatient rehab in 2020,   - switched pain providers, is now followed by [facility]; per , patient is followed by Ace Lee MD and Dipika Bailey NP  (Morpine ER 15mg BID)   - Previously on biweekly Humira, cannot be restarted on Humira at this time due to  concern for immune suppression status post sepsis  - Currently requiring IV Dilaudid for pain control  - Additionally on oral MS Contin 30 mg every 12 hours  - Previously on Lyrica, I have stopped patient's gabapentin, I have restarted Lyrica 75 mg 3 times daily  - Given patient's history of gastric bypass surgery, as well as poor absorption, concern for inability to absorb pain medications  - Message out to pain providers to assist with possibly changing patient to an on oral regimen, can consider transitioning to a fentanyl patch however will need to discuss this with pain providers, hospitalist aware    History of gastric bypass  - Gastric bypass, Lucila-en-Y surgery in 2020  - Dietitian consult ordered    History of bleeding ulcers  - Patient unlikely to tolerate NSAIDs for rheumatoid arthritic pain, history of bleeding ulcers  - Hemoglobin currently stable at 9.1      DVT PPX: SCDs      Thank you for allowing us to participate in the care of this patient.     Patient was seen for 54 minutes on unit/floor of which > 50% of time was spent on counseling and coordination of care regarding the above, including prognosis, risk reduction, benefits of treatment, and options for next stage of care, including discussing rehab plans with therapy, pain regimen with hospitalist, and requesting assistance with evaluation of pain meds from the pain department.    Angelique Cody D.O.    Physical Medicine and Rehabilitation     Please note that this dictation was created using voice recognition software. I have made every reasonable attempt to correct obvious errors, but there may be errors of grammar and possibly content that I did not discover before finalizing the note.

## 2023-03-16 NOTE — PROGRESS NOTES
Hospital Medicine Daily Progress Note    Date of Service  3/16/2023    Chief Complaint  Richard Hubbard II is a 47 y.o. male admitted 2/28/2023 with cellulitis and sepsis    Hospital Course  RThis is a 47-year-old male with a past medical history significant for rheumatoid arthritis on Humira, history of planovalgus deformities and foot ulcers with recent MRI concerning for osteomyelitis of the left navicular bone presented ER on 2/28 with altered mental status, fever at 103 and hypoglycemia, worsening erythema of the left lower extremity.  Patient was then being admitted for sepsis secondary to left lower extremity cellulitis.    Considering patient being hypotensive and hypoglycemic patient was admitted to [facility].  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  ID recommended completing antibiotics for 7 days to treat residual cellulitis chills 3/15/2023.  Patient can be discharged on Augmentin 875/121 tablet p.o. twice daily.     PT/OT evaluate the patient medical and postacute, physiatry has been evaluating the patient      Interval events:  -- No acute events overnight, vital signs been stable, patient is alert, awake, answering questions appropriately.  He does have a left BKA done by Dr. Sanchez on 3/9/2023  -- Continue IV antibiotics till 3/15/2023 as per ID recommendation  -- PT/OT has evaluate the patient, recommend postacute; physiatry following and stated that patient is a candidate for inpatient rehab; awaiting insurance Auth   -- Na at 129  -- Hemoglobin 9.4, monitor, vitamin D10.1, will provide ergocalciferol    3/15:  -- No acute events overnight, medicine has been stable, patient is alert, awake, answering questions appropriately  -Patient noted to have hyponatremia with a sodium of 127 likely hypovolemic hyponatremia, repeat BMP at  a.m.-  --Gabapentin has been increased to 600 3 times daily, added muscle relaxant.  -- PT/OT evaluate the patient medical and postacute, patient will be going to rehab, awaiting insurance auth     3/16:  -- No acute events overnight, vital sign has been stable, patient is alert, awake, answering questions appropriately  -- Patient sodium continues to improve, today at 128, management 1.8.    -- Physiatry the patient, stated patient is able to go to acute rehab, awaiting insurance auth   --Physiatrist is concerned that patient pain not being well controlled considering patient having history of rheumatoid arthritis, BKA    IV pain meds stopped   Po meds frequency incraesed      I have discussed this patient's plan of care and discharge plan at IDT rounds today with Case Management, Nursing, Nursing leadership, and other members of the IDT team.    Consultants/Specialty  critical care    Code Status  Full Code    Disposition  Patient is medically cleared for discharge., awaiting insurance auth  Anticipate discharge to rehab .  I have placed the appropriate orders for post-discharge needs.    Review of Systems  Review of Systems   Constitutional:  Positive for malaise/fatigue. Negative for fever.   HENT:  Negative for congestion and sore throat.    Eyes:  Negative for blurred vision.   Respiratory:  Negative for shortness of breath.    Cardiovascular:  Negative for chest pain and orthopnea.   Gastrointestinal:  Negative for diarrhea and heartburn.   Genitourinary:  Negative for dysuria.   Musculoskeletal:  Positive for joint pain.   Skin:  Negative for rash.   Neurological:  Negative for focal weakness.   Psychiatric/Behavioral:  The patient is nervous/anxious.    All other systems reviewed and are negative.     Physical Exam  Temp:  [36.6 °C (97.9 °F)-36.7 °C (98.1 °F)] 36.6 °C (97.9 °F)  Pulse:  [70-94] 70  Resp:  [16-17] 17  BP: (98-99)/(63-65) 98/63  SpO2:  [94 %-96 %] 96 %    Physical Exam  Vitals and nursing  note reviewed.   Constitutional:       Appearance: Normal appearance. He is well-developed. He is not diaphoretic.   HENT:      Head: Normocephalic and atraumatic.   Eyes:      Extraocular Movements: Extraocular movements intact.      Pupils: Pupils are equal, round, and reactive to light.   Neck:      Vascular: No JVD.      Trachea: No tracheal deviation.   Cardiovascular:      Rate and Rhythm: Normal rate and regular rhythm.   Pulmonary:      Effort: Pulmonary effort is normal. No respiratory distress.      Breath sounds: No stridor.   Abdominal:      General: There is no distension.      Palpations: Abdomen is soft.      Hernia: A hernia (Soft and reducible) is present.   Musculoskeletal:         General: Swelling and tenderness present.      Cervical back: Neck supple.   Skin:     General: Skin is warm.      Findings: Erythema present.      Nails: There is no clubbing.   Neurological:      Mental Status: He is alert and oriented to person, place, and time.      Cranial Nerves: No cranial nerve deficit.      Motor: No abnormal muscle tone.   Psychiatric:         Behavior: Behavior normal.       Fluids    Intake/Output Summary (Last 24 hours) at 3/16/2023 1023  Last data filed at 3/15/2023 2000  Gross per 24 hour   Intake --   Output 900 ml   Net -900 ml         Laboratory  Recent Labs     03/14/23  0051 03/15/23  0047   WBC 8.2 8.4   RBC 3.22* 3.15*   HEMOGLOBIN 9.4* 9.1*   HEMATOCRIT 28.9* 28.1*   MCV 89.8 89.2   MCH 29.2 28.9   MCHC 32.5* 32.4*   RDW 47.8 47.6   PLATELETCT 348 356   MPV 8.9* 8.7*       Recent Labs     03/15/23  0047 03/15/23  1046 03/16/23  0745   SODIUM 126* 127* 128*   POTASSIUM 4.4 4.3 4.5   CHLORIDE 93* 95* 98   CO2 25 25 25   GLUCOSE 94 105* 85   BUN 7* 6* 6*   CREATININE 0.49* 0.42* 0.42*   CALCIUM 7.7* 7.8* 7.7*                       Imaging  IR-US GUIDED PIV   Final Result    Ultrasound-guided PERIPHERAL IV INSERTION performed by    qualified nursing staff as above.       EN-VISSG-[id number]-WITH & W/O LEFT   Final Result      1.  No evidence of left tibial or fibular osteomyelitis or fracture.      2.  Diffuse cellulitis      3.  Atrophy of the muscles of the lower leg with edema and patchy enhancement consistent with myositis. This involves the tibialis posterior muscle to the greatest degree.      4.  Some fluid seen superficial to the medial head of the gastrocnemius muscle. There is no evidence of rim enhancement to suggest presence of abscess.      MR-FOOT-WITH & W/O LEFT   Final Result      1.  Again seen marrow edema involving the medial aspect of the navicular and the adjacent first cuneiform with some enhancement of that area with overlying soft tissue ulceration and some fluid within the overlying soft tissues consistent with    osteomyelitis versus reactive change. This is unchanged from comparison.      2.  Marrow edema with small nondisplaced trabecular fracture involving the first metatarsal head with enhancement of that area. Differential diagnosis includes nondisplaced stress/insufficiency fracture versus osteomyelitis.      3.  Again seen marrow edema involving the base of the second metatarsal with some abnormal enhancement with a small possible linear nondisplaced trabecular fracture extending through that area likely representing a small nondisplaced insufficiency    fracture. There is also arthropathy of that joint.      4.  Again seen marrow edema involving the cuneiforms and the adjacent metatarsals likely representing osteoarthritis.      5.  Osteoarthritis of the tibiotalar joint with chronic osteochondral injury of the lateral aspect of the talar dome. There is also osteoarthritis of the subtalar, talonavicular and calcaneocuboid articulations.      6.  Cellulitis involving the hindfoot most prominently medially.      7.  Prominent tenosynovitis of the tibialis posterior tendon and to a lesser degree the flexor digitorum longus and flexor hallucis tendons.  This underlies the area of soft tissue ulceration and could potentially represent infectious tenosynovitis. This    is new from comparison.      8.  Pes planus and valgus deformity of the hindfoot.      CT-EXTREMITY, LOWER WITH LEFT   Final Result      1.  No evidence of acute osteomyelitis of the left lower extremity.      2.  No evidence of deep abscess formation in the subcutaneous tissues.      US-EXTREMITY VENOUS LOWER UNILAT LEFT   Final Result      DX-TIBIA AND FIBULA LEFT   Final Result      1.  Diffuse soft tissue swelling, edema and/or cellulitis.   2.  No acute osseous abnormality.   3.  Osteopenia.      DX-FOOT-2- LEFT   Final Result      Severe osteopenia and flat foot deformity without evidence of acute displaced fracture      No definite radiographic evidence of osteomyelitis.      CT-HEAD W/O   Final Result      Head CT without contrast within normal limits. No evidence of acute cerebral infarction, hemorrhage or mass lesion.         DX-CHEST-PORTABLE (1 VIEW)   Final Result      No acute cardiac or pulmonary abnormalities are identified.             Assessment/Plan  * Sepsis (HCC)- (present on admission)  Assessment & Plan  Sepsis secondary to LLE tenosynovitis, group A strep  bacteremia  Continue cefazolin cefazolin  Duplex negative for DVT x-rays negative for acute bony pathology  Patient evaluated by orthopedics with nonsurgical management recommended and outpatient follow-up with Dr. Pryor  Continue wound care  Evaluated by ID      S/p SHAHZAD for source control    Vitamin D deficiency  Assessment & Plan  Will provide 50 K of ergocaliferol    Hypocalcemia  Assessment & Plan  On oral supplement    3/11 calcium 6.9, ionized calcium 1.1    Continue monitor    Immunocompromised state due to drug therapy (HCC)  Assessment & Plan  Has been maintained on Humira and intermittently on prednisone     Bacteremia  Assessment & Plan  Group A strep bacteremia  Repeated culture 2/28 negative    ID  following    3/12 continue IV Ancef; will continue   3/16: patient has completed abx    Tenosynovitis of left lower leg  Assessment & Plan  MRI showed tenosynovitis of the tibialis posterior tendon, flexor digitorum longus and hallucis,     S/p left BKA 3/9,  Knee immobilizer to prevent flexion contracture    I discussed with ID, continue antibiotics to complete a 7 days course, okay to transition Ancef to Augmentin 875 mg twice daily    3/12 S/p left BKA 3/9, NWB LLE  Needs dressing change every other day, was changed yesterday, required IV Dilaudid  Sutures removal in 14 to 21 days postoperation    Yeast dermatitis  Assessment & Plan  Started on Diflucan and topical clotrimazole  Skin care    Electrolyte abnormality  Assessment & Plan  Hypokalemia  Hypomagnesemia  Hypophosphatemia  Hypocalcemia    Likely component of refeeding syndrome patient started on thiamine supplements  Replele electrolytes as needed    Hyponatremia  Assessment & Plan  Likely 2/2 hypovolumic hyponatremia   Improving    -- repeat bmp at am     Thrombocytopenia (HCC)  Assessment & Plan  Platelet counts 85K likely reactive secondary to sepsis  Now reslved , platelet at 356      Hypoglycemia  Assessment & Plan  Likely secondary to poor intake  Hypoglycemia protocol   --- encourage good po intake    Ulcer of both feet (HCC)- (present on admission)  Assessment & Plan  Wound care  CT negative for abscess  MRI concerning for osteomyelitis patient evaluated by LPS continue wound care  Continue Ancef  Discussed with ID Dr. Galdamez recommends transitioning to p.o. Zyvox on discharge through 3/9/2023 followed by p.o. amoxicillin 1 g twice daily for 3 to 4 weeks and outpatient follow-up with ID and orthopedics  MRI performed of the left knee through foot yesterday with new findings of tenosynovitis which may be the explanation for his persistent elevated temperatures and mild leukocytosis.  ID following and will cont on IV abx for now     3/10 S/p left  BKA 3/9 Dr Chambers  PT and OT has evaluated and  recs post-acute   Physiatry has evaluate the patient, stated that patient is a good candidate for acute rehab    Opioid dependence (HCC)- (present on admission)  Assessment & Plan  With narcotic dependence    Patient reported that he uses contin 30mg bid and morphine 30 mg every 6 hours. However, I confirmed with pharmacy, it appears that his home dose is contin 15mg bid and morphine 15 mg every 6 hours   -- Does follow with ain specialist as an op    The dose were increased to contin 30mg bid and morphine 30 mg every 4 hours per his request and given his acute issuses  lyrica  Duloxetine    History of immunosuppressive therapy- (present on admission)  Assessment & Plan  Home biweekly Humira and intermittent steroids   -- does follow Dr Conway as an op      Rheumatoid arthritis (HCC)- (present on admission)  Assessment & Plan  Has been maintained on Humira and intermittently on prednisone although no recent steroid use  Continue to hold Humira  Cortisol level 45    tapered off hydrocortisone    3/12 Reported a severe rheumatoid arthritis plan, May consider to resume RA medications since the infection has been controlled.   Will talk to his rheumattologist    History of bleeding ulcers  Assessment & Plan  Continue prilosec  Patient declines pharmacologic DVT prophylaxis discussed RBA         VTE prophylaxis: SCDs/TEDs    I have performed a physical exam and reviewed and updated ROS and Plan today (3/16/2023). In review of yesterday's note (3/15/2023), there are no changes except as documented above.

## 2023-03-17 NOTE — CARE PLAN
The patient is Stable - Low risk of patient condition declining or worsening    Shift Goals  Clinical Goals: [place] Mgt, PT/OT, [place] skin integrity  Patient Goals: Pain Mgt  Family Goals: N/A    Progress made toward(s) clinical / shift goals:Plan of care discussed with patient. Patient reported chronic pain and pain to left leg surgery site. Pain meds given, mepilex on elbow, PT/OT worked with patient during shift.     Patient is not progressing towards the following goals:

## 2023-03-17 NOTE — PROGRESS NOTES
Assumed care of patient 0700. Received Report from [facility] nurse. Patient A&O4, on RA, Reporting a pain level of 8/10. Call light within reach, belongings within reach, Fall precautions in place, and bed alarm is on and bed in lowest position. Discussed plans to control pain, and plan [facility]. Patient does not have any other needs at this time.      All questions were answered. Patient verbalized understanding.

## 2023-03-17 NOTE — DIETARY
"Nutrition services: Day 17 of admit.  Richard Hubbard II is a 47 y.o. male with admitting DX of Sepsis.  Consult received for History of gastric bypass, suspected poor absorption, temporal wasting.    Assessment:  Height: 157.5 cm (5' 2.01\")  Weight: 97.3 kg (214 lb 8.1 oz) on 3/7. No new weight since BKA on 3/9.  Body mass index is 39.22 kg/m²., BMI classification: Class 2 Obesity  Diet/Intake: Regular. Boost Plus supplements ordered.    Evaluation:   Pt with history of Lucila-en-Y gastric bypass in 2000 per chart review. May have some poor absorption per MD note.  Good PO intake has been recorded at % of meals. Spoke with pt at bedside. He states he is eating meals, but dislikes some of the food provided. Boost Plus previously ordered but pt did not drink and requested to stop these. He does not eat much dairy as well. Nutrition Rep to review menu choices with pt this afternoon.  Observed pt with severe temporal wasting, but no other indication of muscle loss.  Medications currently include Calcium citrate, Thiamine, and Vitamin D. Pt states he took a multivitamin up until a few months ago.    Malnutrition Risk: Pt with severe temporal wasting but unable to meet ASPEN criteria.    Recommendations/Plan:  Recommend MVI daily.   Discontinue Boost Plus order per pt request.  Encourage continued good intake of meals >50%.  Document intake of all meals as % taken in ADL's to provide interdisciplinary communication across all shifts.   Monitor weight.  Nutrition rep will continue to see patient for ongoing meal and snack preferences.   RD to monitor per department policy.        "

## 2023-03-17 NOTE — CARE PLAN
The patient is Stable - Low risk of patient condition declining or worsening    Shift Goals  Clinical Goals: pain control, mobilize, maintain skin integrity  Patient Goals: pain management  Family Goals: N/A    Progress made toward(s) clinical / shift goals:  Plan of care and medications discussed with pt. Pt reported of chronic and surgical pain of left leg. Medicated per MAR for pain. Pain tolerable after interventions. Skin assessment per shift, waffle replaced and mepliex on elbows and heels. Encouraged ambulation. Pt got to EOB and stand with max assistance. All fall precautions in place.      Problem: Pain - Standard  Goal: Alleviation of pain or a reduction in pain to the patient’s comfort goal  Outcome: Progressing     Problem: Skin Integrity  Goal: Skin integrity is maintained or improved  Outcome: Progressing     Problem: Fall Risk  Goal: Patient will remain free from falls  Outcome: Progressing     Problem: Knowledge Deficit - Standard  Goal: Patient and family/care givers will demonstrate understanding of plan of care, disease process/condition, diagnostic tests and medications  Outcome: Progressing     Problem: Mobility  Goal: Patient's capacity to carry out activities will improve  Outcome: Progressing

## 2023-03-17 NOTE — CARE PLAN
The patient is Stable - Low risk of patient condition declining or worsening    Shift Goals  Clinical Goals: pain control, discharge planning, PT  Patient Goals: pain management, comfort, discharge planning  Family Goals: not present    Progress made toward(s) clinical / shift goals:  Pt c/o continuous pain throughout the day. Pain meds given as prescribed and other therapies encouraged. BGL WNL. Pt tolerating a regular diet. Dressings CDI. PT unable to see pt today. Bed mobility exercises encouraged.     Patient is not progressing towards the following goals:

## 2023-03-17 NOTE — THERAPY
"Physical Therapy   Daily Treatment     Patient Name: Richard Hubbard II  Age:  47 y.o., Sex:  male  Medical Record #: [id number]  Today's Date: 3/17/2023     Precautions  Precautions: Fall Risk;Non Weight Bearing Left Lower Extremity;Weight Bearing As Tolerated Right Lower Extremity;Immobilizer Left Lower Extremity  Comments: s/p L BKA, immobilizer LLE; foot ulcer R foot; Hx RA    Assessment    Pt now POD #8, seen for f/u treatment. Pt demonstrated improved bed mobility to SBA for all bed mobility with increased time and effort due mainly to pain, modA for slideboard transfer to from w/c. Pt able to complete 75% of WC transfer before pain and fatigue, then began transfer back to bed, however, pt very motivated to participate. Pt reports continued adherence to therex, however, desensitization was reinforced this session for L BKA as pt reports being too sensitive to place knee immobilizer. Pt also endorsing depression this session and stated he is open to discussing depression with appropriate provider; recommend psych consult if appropriate. Pt continues to present with pain, impaired mobility, balance, decreased activity tolerance, and gross decreased UE and LE strength. Recommend placement upon d/c as pt below functional baseline. No DME needs at this time.    Plan    Treatment Plan Status: Continue Current Treatment Plan  Type of Treatment: Bed Mobility, Equipment, Neuro Re-Education / Balance, Gait Training, Orthotics Training , Prosthetics Training, Self Care / Home Evaluation, Stair Training, Therapeutic Activities, Therapeutic Exercise  Treatment Frequency: 5 Times per Week  Treatment Duration: Until Therapy Goals Met    DC Equipment Recommendations: Unable to determine at this time  Discharge Recommendations: Recommend post-acute placement for additional physical therapy services prior to discharge home      Subjective    \"I wouldn't mind talking to someone about how I'm feeling\"     Objective       " 03/17/23 1458   Cosignature   Documentation Review Approved with modifications made by preceptor in flowsheet   Precautions   Precautions Fall Risk;Non Weight Bearing Left Lower Extremity;Weight Bearing As Tolerated Right Lower Extremity;Immobilizer Left Lower Extremity   Comments s/p L BKA, immobilizer LLE; foot ulcer R foot; Hx RA   Vitals   O2 (LPM) 0   O2 Delivery Device None - Room Air   Vitals Comments Pt reported no dizziness throughout session   Pain 0 - 10 Group   Therapist Pain Assessment During Activity;Nurse Notified;Post Activity Pain Same as Prior to Activity  (Pt endorsing high pain throughout session)   Cognition    Cognition / Consciousness WDL   Level of Consciousness Alert   Comments Pleasant and cooperative. Pt tearful during session 2/2 to pain and current medical status. Pt endorsing depression and open to having a conversation regarding depression with appropriate provider.   Passive ROM Lower Body   Passive ROM Lower Body X   Comments RLE tibial external rotation and limited ankle DF baseline, otherwise WDL   Active ROM Lower Body    Active ROM Lower Body  X   Comments L knee flexion to 100 deg. this session. L knee extension to 0   Strength Lower Body   Lower Body Strength  X   Comments R ankle DF 3-/5 within available range, knee ext 3+/5, hip flexion 3-/5. L knee ext 3/5, hip flexion 3-/5, hip abd/add 2+/5. Improved B LE strength this session. Pt reports this is due to continued therex   Sensation Lower Body   Lower Extremity Sensation   X   Comments c/o BLE numbness/tingling   Lower Body Muscle Tone   Lower Body Muscle Tone  WDL   Supine Lower Body Exercise   Supine Lower Body Exercises No   Comments Not reviewed this session   Sitting Lower Body Exercises   Sitting Lower Body Exercises No   Comments Not reviewed this session   Neuro-Muscular Treatments   Neuro-Muscular Treatments Anterior weight shift;Compensatory Strategies;Facilitation;Tactile Cuing;Verbal Cuing;Weight Shift  Right;Weight Shift Left;Sequencing   Comments seated, slideboard transfer   Other Treatments   Other Treatments Provided psychosocial intervention for pt tearfullness and endorsed depression. Education regarding benefits of OOB mobility to w/c level at current level of rehabilitation   Neurological Concerns   Neurological Concerns Yes   Sitting Posture During ADL's Forward Head;Kyphotic;Posterior Pelvic Tilt   Paresthesia Present   Balance   Sitting Balance (Static) Fair +   Sitting Balance (Dynamic) Fair -   Standing Balance (Static)   (NT)   Standing Balance (Dynamic)   (NT)   Weight Shift Sitting Fair   Weight Shift Standing Absent  (L BKA)   Skilled Intervention Compensatory Strategies;Facilitation;Verbal Cuing;Tactile Cuing   Comments EOB and slideboard transfer. Pt weight shift sitting slowly improving, able to offload buttocks in short bursts. Required increased time and energy 2/2 to B UE weakness and RA pain   Bed Mobility    Supine to Sit Standby Assist   Sit to Supine Standby Assist   Scooting Moderate Assist   Rolling Standby Assist   Skilled Intervention Verbal Cuing;Sequencing;Compensatory Strategies   Comments Pt able to bring legs on/off bed independently this session   Gait Analysis   Gait Level Of Assist Unable to Participate   Weight Bearing Status L LE NWB; R LE WBAT   Comments pt has preferred R shoe   Functional Mobility   Sit to Stand   (NT)   Bed, Chair, Wheelchair Transfer Moderate Assist   Transfer Method Slide Board   Mobility supine > EOB > slideboard transfer 75% of way to w/c > slideboard transfer back to bed > return supine   Skilled Intervention Compensatory Strategies;Facilitation;Sequencing;Tactile Cuing;Verbal Cuing   Comments required increased time and effort 2/2 to B UE weakness and RA pain   How much difficulty does the patient currently have...   Turning over in bed (including adjusting bedclothes, sheets and blankets)? 3   Sitting down on and standing up from a chair with  arms (e.g., wheelchair, bedside commode, etc.) 1   Moving from lying on back to sitting on the side of the bed? 3   How much help from another person does the patient currently need...   Moving to and from a bed to a chair (including a wheelchair)? 2   Need to walk in a hospital room? 1   Climbing 3-5 steps with a railing? 1   6 clicks Mobility Score 11   Activity Tolerance   Sitting in Chair NT   Sitting Edge of Bed 30 min   Standing NT   Comments decreased tolerance 2/2 to pain   Short Term Goals    Short Term Goal # 1 Pt will perform supine <> sit without bed features with SPV in 6 visits to progress functional independence   Goal Outcome # 1 Progressing as expected   Short Term Goal # 2 Pt will perform STS with FWW and min A in 6 visits to progress OOB mobility   Goal Outcome # 2 Goal met, new goal added   Short Term Goal # 2 B  Pt will perform STS with FWW and SPV in 6 visits to progress OOB mobility   Goal Outcome # 2 B Progressing slower than expected   Short Term Goal # 3 Pt will perform slide board transfer with Lisa in 6 visits to improve functional independence   Goal Outcome # 3 Progressing as expected   Short Term Goal # 4 Pt will self propel w/c 250ft with SPV in 6 visits to safely access community distances   Goal Outcome # 4 Goal not met   Education Group   Education Provided Role of Physical Therapist;Use of Assistive Device;Brace Wear and Care;Transfer Status   Role of Physical Therapist Patient Response Patient;Family;Acceptance;Explanation;Action Demonstration   Use of Assistive Device Patient Response Patient;Acceptance;Explanation;Action Demonstration   Transfer Status Patient Response Patient;Acceptance;Explanation;Action Demonstration   Brace Wear & Care Patient Response Patient;Acceptance;Refuses;Explanation;Verbal Demonstration  (politely refusing 2/2 to pain)   Physical Therapy Treatment Plan   Physical Therapy Treatment Plan Continue Current Treatment Plan   Anticipated Discharge  Equipment and Recommendations   DC Equipment Recommendations Unable to determine at this time   Discharge Recommendations Recommend post-acute placement for additional physical therapy services prior to discharge home   Interdisciplinary Plan of Care Collaboration   IDT Collaboration with  Nursing   Patient Position at End of Therapy In Bed;Call Light within Reach;Tray Table within Reach;Phone within Reach   Collaboration Comments RN updated   Session Information   Date / Session Number  3/17- 6 (1/5, 3/23)

## 2023-03-17 NOTE — DISCHARGE PLANNING
[facility] authorized IPR, physiatry has concerns regarding patient's pain management. Patient was a former patient of Dr Gutierrez; previously on Lyrica, concerns for immune suppression in setting of sepsis. IV Dilaudid last given yesterday, currently on PO morphine. [facility]CC will continue to follow.

## 2023-03-17 NOTE — THERAPY
"Occupational Therapy  Daily Treatment     Patient Name: Richard Hubbard II  Age:  47 y.o., Sex:  male  Medical Record #: [id number]  Today's Date: 3/17/2023     Precautions  Precautions: Fall Risk, Non Weight Bearing Left Lower Extremity, Weight Bearing As Tolerated Right Lower Extremity, Immobilizer Left Lower Extremity  Comments: s/p L BKA, immobilizer in room    Assessment    Pt was seen for OT treatment. Pt gave good effort despite pain in BLE's and BUE's with activity. Pt required breath control and relaxation techs due to dizziness with activity with fair carry over. Worked on energy conservation techs. Min to Mod A for bed mobility. Min A for UB dressing changes and Mod A for LB dressing using AE and extended time to complete. Rolling left and right in bed  for clothing management up over hips with Mod A. Pt donned immobilizer  on LLE with SBA seated in bed with HOB up fully for support with dynamic sitting balance.   Fair/ Fair- dynamic sitting balance due to weakness and pain which is limiting. Pt unable to stand this OT session due to weakness, dizziness and pain fearful of falling.  Pt stated he has no one at home who can help him at this time.[place] [facility] [place] stated, \"I can't now\". Pt gave good effort and is motivated in therapy stating,  \" [facility]. They are great over there and I need to be more able and Indep to return home.\" Pt needed extended time for most self care tasks at this time. Pt assisted BTB  with Mod A. RN updated on OT treatment findings and recommendations. Will continue to follow.     Plan    Treatment Plan Status: (P) Continue Current Treatment Plan  Type of Treatment: (P) Self Care / Activities of Daily Living, Neuro Re-Education / Balance, Therapeutic Exercises, Therapeutic Activity, Adaptive Equipment  Treatment Frequency: (P) 3 Times per Week  Treatment Duration: (P) Until Therapy Goals Met    DC Equipment " "Recommendations: (P) Unable to determine at this time  Discharge Recommendations: (P) Recommend post-acute placement for additional occupational therapy services prior to discharge home    Subjective    \"I will try but I can't get to the EOB by myself\".      Objective       03/17/23 0914   Cognition    Cognition / Consciousness WDL   Level of Consciousness Alert   Comments Pleasant and cooperative, tearful at times stating,\"I can't believe I no longer have a foot down there\".   Passive ROM Upper Body   Passive ROM Upper Body X   Comments BUE limited by pain due to RA per pt.   Active ROM Upper Body   Active ROM Upper Body  X   Dominant Hand Right   Comments WFL but limited at times  by pain.   Strength Upper Body   Upper Body Strength  X   Comments Generalized weakness, appears to be due to pain   Neuro-Muscular Treatments   Neuro-Muscular Treatments Anterior weight shift;Compensatory Strategies;Postural Changes;Tapping;Sequencing;Tactile Cuing;Verbal Cuing;Weight Shift Right;Weight Shift Left   Comments in seated position   Other Treatments   Other Treatments Provided Psychosocial intervention addressed. Tearful and needed increased support emotionally. Pt frequently appologizing for his tears. Breath control with relaxation techs to decreased dizziness with good results.   Balance   Sitting Balance (Static) Fair   Sitting Balance (Dynamic) Fair -   Weight Shift Sitting Poor   Comments seated activity with HOB elevated and use of bedrails to support sitting balance with activity. Did not attempt standing. Pt declined due to pain issues.   Bed Mobility    Supine to Sit Minimal Assist   Sit to Supine Minimal Assist   Scooting Moderate Assist   Rolling Standby Assist   Comments continues to require assist to lift B LE's off and onto bed.   Activities of Daily Living   Grooming Contact Guard Assist;Seated   Bathing   (declined)   Upper Body Dressing Minimal Assist   Lower Body Dressing Moderate Assist   Toileting   (no " "need for BM at this time. Pt uses urinal Indep)   Skilled Intervention Verbal Cuing;Tactile Cuing;Sequencing;Compensatory Strategies   Comments Pt will need assist with most self care tasks at this time. Pt stated he \"has no one at home that can help.\"   Functional Mobility   Comments Did not attempt this OT session due to pain issues and dizziness.   Activity Tolerance   Comments limited due to pain.   Patient / Family Goals   Patient / Family Goal #1 To get better   Goal #1 Outcome Progressing slower than expected   Short Term Goals   Short Term Goal # 1 Pt will demo LB dressing w/ SPV   Goal Outcome # 1 Progressing slower than expected   Short Term Goal # 2 Pt will demo standing grooming w/ SPV   Goal Outcome # 2 Goal not met   Short Term Goal # 3 Pt will demo toilet txf w/ SPV   Goal Outcome # 3 Goal not met   Occupational Therapy Treatment Plan    O.T. Treatment Plan Continue Current Treatment Plan   Treatment Interventions Self Care / Activities of Daily Living;Neuro Re-Education / Balance;Therapeutic Exercises;Therapeutic Activity;Adaptive Equipment   Treatment Frequency 3 Times per Week   Duration Until Therapy Goals Met   Anticipated Discharge Equipment and Recommendations   DC Equipment Recommendations Unable to determine at this time   Discharge Recommendations Recommend post-acute placement for additional occupational therapy services prior to discharge home   Interdisciplinary Plan of Care Collaboration   IDT Collaboration with  Nursing;Certified Nursing Assistant   Collaboration Comments RN updated           "

## 2023-03-17 NOTE — PROGRESS NOTES
Hospital Medicine Daily Progress Note    Date of Service  3/17/2023    Chief Complaint  Richard Hubbard II is a 47 y.o. male admitted 2/28/2023 with cellulitis and sepsis    Hospital Course  This is a 47-year-old male with a past medical history significant for rheumatoid arthritis on Humira, history of planovalgus deformities and foot ulcers with recent MRI concerning for osteomyelitis of the left navicular bone presented ER on 2/28 with altered mental status, fever at 103 and hypoglycemia, worsening erythema of the left lower extremity.  Patient was then being admitted for sepsis secondary to left lower extremity cellulitis.    Considering patient being hypotensive and hypoglycemic patient was admitted to [facility].  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  ID recommended completing antibiotics for 7 days to treat residual cellulitis chills 3/15/2023.  Patient can be discharged on Augmentin 875/121 tablet p.o. twice daily.     PT/OT evaluate the patient medical and postacute, physiatry has been evaluating the patient      Interval events:  -- No acute events overnight, vital signs been stable, patient is alert, awake, answering questions appropriately.  He does have a left BKA done by Dr. Sanchez on 3/9/2023  -- Continue IV antibiotics till 3/15/2023 as per ID recommendation  -- PT/OT has evaluate the patient, recommend postacute; physiatry following and stated that patient is a candidate for inpatient rehab; awaiting insurance Auth   -- Na at 129  -- Hemoglobin 9.4, monitor, vitamin D10.1, will provide ergocalciferol    3/15:  -- No acute events overnight, medicine has been stable, patient is alert, awake, answering questions appropriately  -Patient noted to have hyponatremia with a sodium of 127 likely hypovolemic hyponatremia, repeat BMP at  a.m.-  --Gabapentin has been increased to 600 3 times daily, added muscle relaxant.  -- PT/OT evaluate the patient medical and postacute, patient will be going to rehab, awaiting insurance auth     3/16:  -- No acute events overnight, vital sign has been stable, patient is alert, awake, answering questions appropriately  -- Patient sodium continues to improve, today at 128, management 1.8.    -- Physiatry the patient, stated patient is able to go to acute rehab, awaiting insurance auth   --Physiatrist is concerned that patient pain not being well controlled considering patient having history of rheumatoid arthritis, BKA    IV pain meds stopped   Po meds frequency incraesed    3/17:  -- No acute events overnight, vital signs are stable, patient is alert, awake, answering questions appropriately.  Patient is concerned about his pain medication.  -- Blood work has been obtained, not completed at this time.  Patient hasnt been on IV pain meds since yesterday  -- Lyrica 75 3 times daily by physiatry    I have discussed this patient's plan of care and discharge plan at IDT rounds today with Case Management, Nursing, Nursing leadership, and other members of the IDT team.    Consultants/Specialty  critical care    Code Status  Full Code    Disposition  Patient is medically cleared for discharge., awaiting insurance auth  Anticipate discharge to rehab .  I have placed the appropriate orders for post-discharge needs.    Review of Systems  Review of Systems   Constitutional:  Positive for malaise/fatigue. Negative for fever.   HENT:  Negative for congestion and sore throat.    Eyes:  Negative for blurred vision.   Respiratory:  Negative for shortness of breath.    Cardiovascular:  Negative for chest pain and orthopnea.   Gastrointestinal:  Negative for diarrhea, heartburn and nausea.   Genitourinary:  Negative for dysuria.   Musculoskeletal:  Positive for joint pain. Negative for myalgias.   Skin:  Negative for rash.    Neurological:  Negative for focal weakness and headaches.   Psychiatric/Behavioral:  The patient is nervous/anxious.    All other systems reviewed and are negative.     Physical Exam  Temp:  [36.7 °C (98.1 °F)-36.8 °C (98.2 °F)] 36.7 °C (98.1 °F)  Pulse:  [75-83] 82  Resp:  [16-18] 18  BP: ()/(64-68) 102/68  SpO2:  [94 %-96 %] 94 %    Physical Exam  Vitals and nursing note reviewed.   Constitutional:       Appearance: Normal appearance. He is well-developed. He is not diaphoretic.   HENT:      Head: Normocephalic and atraumatic.   Eyes:      Extraocular Movements: Extraocular movements intact.      Pupils: Pupils are equal, round, and reactive to light.   Neck:      Vascular: No JVD.      Trachea: No tracheal deviation.   Cardiovascular:      Rate and Rhythm: Normal rate and regular rhythm.   Pulmonary:      Effort: Pulmonary effort is normal. No respiratory distress.      Breath sounds: No stridor.   Abdominal:      General: There is no distension.      Palpations: Abdomen is soft.      Hernia: A hernia (Soft and reducible) is present.   Musculoskeletal:         General: Swelling and tenderness present.      Cervical back: Neck supple.   Skin:     General: Skin is warm.      Findings: Erythema present.      Nails: There is no clubbing.   Neurological:      Mental Status: He is alert and oriented to person, place, and time.      Cranial Nerves: No cranial nerve deficit.      Motor: No abnormal muscle tone.   Psychiatric:         Behavior: Behavior normal.       Fluids    Intake/Output Summary (Last 24 hours) at 3/17/2023 1228  Last data filed at 3/17/2023 0900  Gross per 24 hour   Intake --   Output 4665 ml   Net -4665 ml         Laboratory  Recent Labs     03/15/23  0047   WBC 8.4   RBC 3.15*   HEMOGLOBIN 9.1*   HEMATOCRIT 28.1*   MCV 89.2   MCH 28.9   MCHC 32.4*   RDW 47.6   PLATELETCT 356   MPV 8.7*       Recent Labs     03/15/23  0047 03/15/23  1046 03/16/23  0745   SODIUM 126* 127* 128*   POTASSIUM 4.4  4.3 4.5   CHLORIDE 93* 95* 98   CO2 25 25 25   GLUCOSE 94 105* 85   BUN 7* 6* 6*   CREATININE 0.49* 0.42* 0.42*   CALCIUM 7.7* 7.8* 7.7*                       Imaging  IR-US GUIDED PIV   Final Result    Ultrasound-guided PERIPHERAL IV INSERTION performed by    qualified nursing staff as above.      [id number]-WITH & W/O LEFT   Final Result      1.  No evidence of left tibial or fibular osteomyelitis or fracture.      2.  Diffuse cellulitis      3.  Atrophy of the muscles of the lower leg with edema and patchy enhancement consistent with myositis. This involves the tibialis posterior muscle to the greatest degree.      4.  Some fluid seen superficial to the medial head of the gastrocnemius muscle. There is no evidence of rim enhancement to suggest presence of abscess.      MR-FOOT-WITH & W/O LEFT   Final Result      1.  Again seen marrow edema involving the medial aspect of the navicular and the adjacent first cuneiform with some enhancement of that area with overlying soft tissue ulceration and some fluid within the overlying soft tissues consistent with    osteomyelitis versus reactive change. This is unchanged from comparison.      2.  Marrow edema with small nondisplaced trabecular fracture involving the first metatarsal head with enhancement of that area. Differential diagnosis includes nondisplaced stress/insufficiency fracture versus osteomyelitis.      3.  Again seen marrow edema involving the base of the second metatarsal with some abnormal enhancement with a small possible linear nondisplaced trabecular fracture extending through that area likely representing a small nondisplaced insufficiency    fracture. There is also arthropathy of that joint.      4.  Again seen marrow edema involving the cuneiforms and the adjacent metatarsals likely representing osteoarthritis.      5.  Osteoarthritis of the tibiotalar joint with chronic osteochondral injury of the lateral aspect of the talar dome. There is also  osteoarthritis of the subtalar, talonavicular and calcaneocuboid articulations.      6.  Cellulitis involving the hindfoot most prominently medially.      7.  Prominent tenosynovitis of the tibialis posterior tendon and to a lesser degree the flexor digitorum longus and flexor hallucis tendons. This underlies the area of soft tissue ulceration and could potentially represent infectious tenosynovitis. This    is new from comparison.      8.  Pes planus and valgus deformity of the hindfoot.      CT-EXTREMITY, LOWER WITH LEFT   Final Result      1.  No evidence of acute osteomyelitis of the left lower extremity.      2.  No evidence of deep abscess formation in the subcutaneous tissues.      US-EXTREMITY VENOUS LOWER UNILAT LEFT   Final Result      DX-TIBIA AND FIBULA LEFT   Final Result      1.  Diffuse soft tissue swelling, edema and/or cellulitis.   2.  No acute osseous abnormality.   3.  Osteopenia.      DX-FOOT-2- LEFT   Final Result      Severe osteopenia and flat foot deformity without evidence of acute displaced fracture      No definite radiographic evidence of osteomyelitis.      CT-HEAD W/O   Final Result      Head CT without contrast within normal limits. No evidence of acute cerebral infarction, hemorrhage or mass lesion.         DX-CHEST-PORTABLE (1 VIEW)   Final Result      No acute cardiac or pulmonary abnormalities are identified.             Assessment/Plan  * Sepsis (HCC)- (present on admission)  Assessment & Plan  Sepsis secondary to LLE tenosynovitis, group A strep  bacteremia  Continue cefazolin cefazolin  Duplex negative for DVT x-rays negative for acute bony pathology  Patient evaluated by orthopedics with nonsurgical management recommended and outpatient follow-up with Dr. Pryor  Continue wound care  Evaluated by ID      S/p BKA for source control    Vitamin D deficiency  Assessment & Plan  Will provide 50 K of ergocaliferol    Hypocalcemia  Assessment & Plan  On oral supplement    3/11  calcium 6.9, ionized calcium 1.1    Continue monitor    Immunocompromised state due to drug therapy (HCC)  Assessment & Plan  Has been maintained on Humira and intermittently on prednisone     Bacteremia  Assessment & Plan  Group A strep bacteremia  Repeated culture 2/28 negative    ID following    3/12 continue IV Ancef; will continue   3/16: patient has completed abx  S/p completion off treatment    Tenosynovitis of left lower leg  Assessment & Plan  MRI showed tenosynovitis of the tibialis posterior tendon, flexor digitorum longus and hallucis,     S/p left BKA 3/9,  Knee immobilizer to prevent flexion contracture    I discussed with ID, continue antibiotics to complete a 7 days course, okay to transition Ancef to Augmentin 875 mg twice daily    3/12 S/p left BKA 3/9, NWB LLE  Needs dressing change every other day, was changed yesterday, required IV Dilaudid  Sutures removal in 14 to 21 days postoperation    Yeast dermatitis  Assessment & Plan  Started on Diflucan and topical clotrimazole  Skin care    Electrolyte abnormality  Assessment & Plan  Hypokalemia  Hypomagnesemia  Hypophosphatemia  Hypocalcemia    Likely component of refeeding syndrome patient started on thiamine supplements  Replele electrolytes as needed    Hyponatremia  Assessment & Plan  Likely 2/2 hypovolumic hyponatremia   Improving    -- repeat bmp at am; still pending at the time of  Charting; discussed with nursing staff    Thrombocytopenia (HCC)  Assessment & Plan  Platelet counts 85K likely reactive secondary to sepsis  Now reslved , platelet at 356      Hypoglycemia  Assessment & Plan  Likely secondary to poor intake  Hypoglycemia protocol   --- encourage good po intake    Ulcer of both feet (HCC)- (present on admission)  Assessment & Plan  Wound care  CT negative for abscess  MRI concerning for osteomyelitis patient evaluated by LPS continue wound care  Continue Ancef  Discussed with ID Dr. Galdamez recommends transitioning to p.o. Zyvox  on discharge through 3/9/2023 followed by p.o. amoxicillin 1 g twice daily for 3 to 4 weeks and outpatient follow-up with ID and orthopedics  MRI performed of the left knee through foot yesterday with new findings of tenosynovitis which may be the explanation for his persistent elevated temperatures and mild leukocytosis.  ID following and will cont on IV abx for now     3/10 S/p left BKA 3/9 Dr Chambers  PT and OT has evaluated and  recs post-acute   Physiatry has evaluate the patient, stated that patient is a good candidate for acute rehab    Opioid dependence (HCC)- (present on admission)  Assessment & Plan  With narcotic dependence    Patient reported that he uses contin 30mg bid and morphine 30 mg every 6 hours. However, I confirmed with pharmacy, it appears that his home dose is contin 15mg bid and morphine 15 mg every 6 hours   -- Does follow with ain specialist as an op    The dose were increased to contin 30mg bid and morphine 30 mg every 4 hours per his request and given his acute issuses  lyrica  Duloxetine    History of immunosuppressive therapy- (present on admission)  Assessment & Plan  Home biweekly Humira and intermittent steroids   -- does follow Dr Conway as an op      Rheumatoid arthritis (HCC)- (present on admission)  Assessment & Plan  Has been maintained on Humira and intermittently on prednisone although no recent steroid use  Continue to hold Humira  Cortisol level 45    tapered off hydrocortisone    3/12 Reported a severe rheumatoid arthritis plan, May consider to resume RA medications since the infection has been controlled.   3/17: He needs to follow up with rheumatology as an op     History of bleeding ulcers  Assessment & Plan  Continue prilosec  Patient declines pharmacologic DVT prophylaxis discussed RBA         VTE prophylaxis: SCDs/TEDs    He has been declining chemical ppx at this time;  he understand the risk of having clots    I have performed a physical exam and reviewed and  updated ROS and Plan today (3/17/2023). In review of yesterday's note (3/16/2023), there are no changes except as documented above.

## 2023-03-18 NOTE — PROGRESS NOTES
Messaged DR regarding patient request to increase dose of lyrica and request for naqsal spray, Dr acknowledged request, but no order has been put in as of now.    Also endorsed to night shift to have day shift notify ortho dr shonda lloyd dressing change to left BKA

## 2023-03-18 NOTE — CARE PLAN
The patient is Stable - Low risk of patient condition declining or worsening    Shift Goals  Clinical Goals: pain control, skin integrity  Patient Goals: pain control, rest  Family Goals: not present    Progress made toward(s) clinical / shift goals:    Problem: Pain - Standard  Goal: Alleviation of pain or a reduction in pain to the patient’s comfort goal  Outcome: Progressing     Problem: Fall Risk  Goal: Patient will remain free from falls  Outcome: Progressing     Problem: Knowledge Deficit - Standard  Goal: Patient and family/care givers will demonstrate understanding of plan of care, disease process/condition, diagnostic tests and medications  Outcome: Progressing       Patient is not progressing towards the following goals:

## 2023-03-18 NOTE — PROGRESS NOTES
Assumed care of patient 0700. Received Report from [facility] nurse. Patient A&O4, on RA, Reporting a pain level of 7.5/10. Call light within reach, belongings within reach, Fall precautions in place, bed in lowest position. Discussed plan for the day, increase lyrica dose, Obtain orders for nasal spray, eye drops and dressing change for LBKA. Continue to monitor pain. Patient agreeable to plan, does not have any other needs at this time.     All questions were answered. Patient verbalized understanding.

## 2023-03-18 NOTE — CARE PLAN
The patient is Stable - Low risk of patient condition declining or worsening    Shift Goals  Clinical Goals: Pain Mgt, Dressing change  Patient Goals: Pain mgt, Update meds, Dressing change LBKA  Family Goals: N/A    Progress made toward(s) clinical / shift goals:  Dressing prders placed, dressing changed, meds updated.    Patient is not progressing towards the following goals:

## 2023-03-18 NOTE — DISCHARGE PLANNING
[facility] Transitional Care Coordination    Rehab following for post acute services.  [facility] authorized IPR, physiatry has concerns regarding patient's pain management. Patient was a former patient of Dr Gutierrez; previously on Lyrica, concerns for immune suppression in setting of sepsis.    Will monitor for updates.

## 2023-03-18 NOTE — PROGRESS NOTES
Hospital Medicine Daily Progress Note    Date of Service  3/18/2023    Chief Complaint  Richard Hubbard II is a 47 y.o. male admitted 2/28/2023 with cellulitis and sepsis    Hospital Course  This is a 47-year-old male with a past medical history significant for rheumatoid arthritis on Humira, history of planovalgus deformities and foot ulcers with recent MRI concerning for osteomyelitis of the left navicular bone presented ER on 2/28 with altered mental status, fever at 103 and hypoglycemia, worsening erythema of the left lower extremity.  Patient was then being admitted for sepsis secondary to left lower extremity cellulitis.    Considering patient being hypotensive and hypoglycemic patient was admitted to [facility].  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  ID recommended completing antibiotics for 7 days to treat residual cellulitis chills 3/15/2023.  Patient can be discharged on Augmentin 875/121 tablet p.o. twice daily.     PT/OT evaluate the patient medical and postacute, physiatry has been evaluating the patient      Interval events:  -- No acute events overnight, vital signs been stable, patient is alert, awake, answering questions appropriately.  He does have a left BKA done by Dr. Sanchez on 3/9/2023  -- Continue IV antibiotics till 3/15/2023 as per ID recommendation  -- PT/OT has evaluate the patient, recommend postacute; physiatry following and stated that patient is a candidate for inpatient rehab; awaiting insurance Auth   -- Na at 129  -- Hemoglobin 9.4, monitor, vitamin D10.1, will provide ergocalciferol    3/15:  -- No acute events overnight, medicine has been stable, patient is alert, awake, answering questions appropriately  -Patient noted to have hyponatremia with a sodium of 127 likely hypovolemic hyponatremia, repeat BMP at  a.m.-  --Gabapentin has been increased to 600 3 times daily, added muscle relaxant.  -- PT/OT evaluate the patient medical and postacute, patient will be going to rehab, awaiting insurance auth     3/16:  -- No acute events overnight, vital sign has been stable, patient is alert, awake, answering questions appropriately  -- Patient sodium continues to improve, today at 128, management 1.8.    -- Physiatry the patient, stated patient is able to go to acute rehab, awaiting insurance auth   --Physiatrist is concerned that patient pain not being well controlled considering patient having history of rheumatoid arthritis, BKA    IV pain meds stopped   Po meds frequency incraesed    3/17:  -- No acute events overnight, vital signs are stable, patient is alert, awake, answering questions appropriately.  Patient is concerned about his pain medication.  -- Blood work has been obtained, not completed at this time.  Patient hasnt been on IV pain meds since yesterday  -- Lyrica 75 3 times daily by physiatry    3/1 no acute events overnight, vital sign has been stable, patient is alert, awake, answering questions appropriately.8:  -- Patient reports that his pain has gotten significantly better.  PT/OT evaluate the patient, recommend postacute, physiatry is evaluated.  -- Lyrica has been increased to 100 3 times daily  -- sodium at 131, monitor; improving   -- monitor H&H, today at 9.0      I have discussed this patient's plan of care and discharge plan at IDT rounds today with Case Management, Nursing, Nursing leadership, and other members of the IDT team.    Consultants/Specialty  critical care    Code Status  Full Code    Disposition  Patient is medically cleared for discharge., awaiting insurance auth  Anticipate discharge to rehab .  I have placed the appropriate orders for post-discharge needs.    Review of Systems  Review of Systems   Constitutional:  Positive for malaise/fatigue. Negative for fever.   HENT:  Negative for  congestion and sore throat.    Eyes:  Negative for blurred vision.   Respiratory:  Negative for shortness of breath.    Cardiovascular:  Negative for chest pain and orthopnea.   Gastrointestinal:  Negative for diarrhea, heartburn and nausea.   Genitourinary:  Negative for dysuria.   Musculoskeletal:  Positive for joint pain. Negative for myalgias.   Skin:  Negative for rash.   Neurological:  Negative for focal weakness and headaches.   Psychiatric/Behavioral:  The patient is nervous/anxious.    All other systems reviewed and are negative.     Physical Exam  Temp:  [36.5 °C (97.7 °F)-37 °C (98.6 °F)] 36.5 °C (97.7 °F)  Pulse:  [74-88] 86  Resp:  [16-17] 17  BP: ()/(63-76) 97/63  SpO2:  [94 %-97 %] 94 %    Physical Exam  Vitals and nursing note reviewed.   Constitutional:       Appearance: Normal appearance. He is well-developed. He is not diaphoretic.   HENT:      Head: Normocephalic and atraumatic.   Eyes:      Extraocular Movements: Extraocular movements intact.      Pupils: Pupils are equal, round, and reactive to light.   Neck:      Vascular: No JVD.      Trachea: No tracheal deviation.   Cardiovascular:      Rate and Rhythm: Normal rate and regular rhythm.   Pulmonary:      Effort: Pulmonary effort is normal. No respiratory distress.      Breath sounds: No stridor.   Abdominal:      General: There is no distension.      Palpations: Abdomen is soft.      Hernia: A hernia (Soft and reducible) is present.   Musculoskeletal:         General: Swelling and tenderness present.      Cervical back: Neck supple.   Skin:     General: Skin is warm.      Findings: Erythema present.      Nails: There is no clubbing.   Neurological:      Mental Status: He is alert and oriented to person, place, and time.      Cranial Nerves: No cranial nerve deficit.      Motor: No abnormal muscle tone.   Psychiatric:         Behavior: Behavior normal.       Fluids    Intake/Output Summary (Last 24 hours) at 3/18/2023 1505  Last data  filed at 3/18/2023 1300  Gross per 24 hour   Intake --   Output 6775 ml   Net -6775 ml         Laboratory  Recent Labs     03/18/23  0100   WBC 6.2   RBC 3.17*   HEMOGLOBIN 9.0*   HEMATOCRIT 29.2*   MCV 92.1   MCH 28.4   MCHC 30.8*   RDW 47.9   PLATELETCT 319   MPV 8.6*       Recent Labs     03/17/23  1222 03/18/23  0100 03/18/23  1019   SODIUM 130* 128* 131*   POTASSIUM 4.0 4.2 4.2   CHLORIDE 96 95* 96   CO2 26 27 28   GLUCOSE 94 111* 71   BUN 5* 6* 5*   CREATININE 0.39* 0.48* 0.42*   CALCIUM 7.9* 8.0* 8.1*                       Imaging  IR-US GUIDED PIV   Final Result    Ultrasound-guided PERIPHERAL IV INSERTION performed by    qualified nursing staff as above.      [id number] & W/O LEFT   Final Result      1.  No evidence of left tibial or fibular osteomyelitis or fracture.      2.  Diffuse cellulitis      3.  Atrophy of the muscles of the lower leg with edema and patchy enhancement consistent with myositis. This involves the tibialis posterior muscle to the greatest degree.      4.  Some fluid seen superficial to the medial head of the gastrocnemius muscle. There is no evidence of rim enhancement to suggest presence of abscess.      MR-FOOT-WITH & W/O LEFT   Final Result      1.  Again seen marrow edema involving the medial aspect of the navicular and the adjacent first cuneiform with some enhancement of that area with overlying soft tissue ulceration and some fluid within the overlying soft tissues consistent with    osteomyelitis versus reactive change. This is unchanged from comparison.      2.  Marrow edema with small nondisplaced trabecular fracture involving the first metatarsal head with enhancement of that area. Differential diagnosis includes nondisplaced stress/insufficiency fracture versus osteomyelitis.      3.  Again seen marrow edema involving the base of the second metatarsal with some abnormal enhancement with a small possible linear nondisplaced trabecular fracture extending through  that area likely representing a small nondisplaced insufficiency    fracture. There is also arthropathy of that joint.      4.  Again seen marrow edema involving the cuneiforms and the adjacent metatarsals likely representing osteoarthritis.      5.  Osteoarthritis of the tibiotalar joint with chronic osteochondral injury of the lateral aspect of the talar dome. There is also osteoarthritis of the subtalar, talonavicular and calcaneocuboid articulations.      6.  Cellulitis involving the hindfoot most prominently medially.      7.  Prominent tenosynovitis of the tibialis posterior tendon and to a lesser degree the flexor digitorum longus and flexor hallucis tendons. This underlies the area of soft tissue ulceration and could potentially represent infectious tenosynovitis. This    is new from comparison.      8.  Pes planus and valgus deformity of the hindfoot.      CT-EXTREMITY, LOWER WITH LEFT   Final Result      1.  No evidence of acute osteomyelitis of the left lower extremity.      2.  No evidence of deep abscess formation in the subcutaneous tissues.      US-EXTREMITY VENOUS LOWER UNILAT LEFT   Final Result      DX-TIBIA AND FIBULA LEFT   Final Result      1.  Diffuse soft tissue swelling, edema and/or cellulitis.   2.  No acute osseous abnormality.   3.  Osteopenia.      DX-FOOT-2- LEFT   Final Result      Severe osteopenia and flat foot deformity without evidence of acute displaced fracture      No definite radiographic evidence of osteomyelitis.      CT-HEAD W/O   Final Result      Head CT without contrast within normal limits. No evidence of acute cerebral infarction, hemorrhage or mass lesion.         DX-CHEST-PORTABLE (1 VIEW)   Final Result      No acute cardiac or pulmonary abnormalities are identified.             Assessment/Plan  * Sepsis (HCC)- (present on admission)  Assessment & Plan  Sepsis secondary to LLE tenosynovitis, group A strep  bacteremia  Continue cefazolin cefazolin  Duplex negative for  DVT x-rays negative for acute bony pathology  Patient evaluated by orthopedics with nonsurgical management recommended and outpatient follow-up with Dr. Pryor  Continue wound care  Evaluated by ID      S/p BKA for source control    Vitamin D deficiency  Assessment & Plan  Will provide 50 K of ergocaliferol    Hypocalcemia  Assessment & Plan  On oral supplement    3/11 calcium 6.9, ionized calcium 1.1    Continue monitor    Immunocompromised state due to drug therapy (HCC)  Assessment & Plan  Has been maintained on Humira and intermittently on prednisone     Bacteremia  Assessment & Plan  Group A strep bacteremia  Repeated culture 2/28 negative    ID following    3/12 continue IV Ancef; will continue   3/16: patient has completed abx  S/p completion off treatment    Tenosynovitis of left lower leg  Assessment & Plan  MRI showed tenosynovitis of the tibialis posterior tendon, flexor digitorum longus and hallucis,     S/p left BKA 3/9,  Knee immobilizer to prevent flexion contracture    I discussed with ID, continue antibiotics to complete a 7 days course, okay to transition Ancef to Augmentin 875 mg twice daily    3/12 S/p left BKA 3/9, NWB LLE  Needs dressing change every other day, was changed yesterday, required IV Dilaudid  Sutures removal in 14 to 21 days postoperation    Yeast dermatitis  Assessment & Plan  Started on Diflucan and topical clotrimazole  Skin care    Electrolyte abnormality  Assessment & Plan  Hypokalemia  Hypomagnesemia  Hypophosphatemia  Hypocalcemia    Likely component of refeeding syndrome patient started on thiamine supplements  Replele electrolytes as needed    Hyponatremia  Assessment & Plan  Likely 2/2 hypovolumic hyponatremia   Improving    -- today at 131    Thrombocytopenia (HCC)  Assessment & Plan  Platelet counts 85K likely reactive secondary to sepsis  Now reslved , platelet at 356      Hypoglycemia  Assessment & Plan  Likely secondary to poor intake  Hypoglycemia protocol   ---  encourage good po intake    Ulcer of both feet (HCC)- (present on admission)  Assessment & Plan  Wound care  CT negative for abscess  MRI concerning for osteomyelitis patient evaluated by LPS continue wound care  Continue Ancef  Discussed with ID Dr. Galdamez recommends transitioning to p.o. Zyvox on discharge through 3/9/2023 followed by p.o. amoxicillin 1 g twice daily for 3 to 4 weeks and outpatient follow-up with ID and orthopedics  MRI performed of the left knee through foot yesterday with new findings of tenosynovitis which may be the explanation for his persistent elevated temperatures and mild leukocytosis.  ID following and will cont on IV abx for now     3/10 S/p left BKA 3/9 Dr Chambers  PT and OT has evaluated and  recs post-acute   Physiatry has evaluate the patient, stated that patient is a good candidate for acute rehab    Opioid dependence (HCC)- (present on admission)  Assessment & Plan  With narcotic dependence    Patient reported that he uses contin 30mg bid and morphine 30 mg every 6 hours. However, I confirmed with pharmacy, it appears that his home dose is contin 15mg bid and morphine 15 mg every 6 hours   -- Does follow with ain specialist as an op    The dose were increased to contin 30mg bid and morphine 30 mg every 4 hours per his request and given his acute issuses  lyrica 100 tid   Duloxetine    History of immunosuppressive therapy- (present on admission)  Assessment & Plan  Home biweekly Humira and intermittent steroids   -- does follow Dr Conway as an op      Rheumatoid arthritis (HCC)- (present on admission)  Assessment & Plan  Has been maintained on Humira and intermittently on prednisone although no recent steroid use  Continue to hold Humira  Cortisol level 45    tapered off hydrocortisone    3/12 Reported a severe rheumatoid arthritis plan, May consider to resume RA medications since the infection has been controlled.   3/17: He needs to follow up with rheumatology as an op      History of bleeding ulcers  Assessment & Plan  Continue prilosec  Patient declines pharmacologic DVT prophylaxis discussed RBA         VTE prophylaxis: SCDs/TEDs    He has been declining chemical ppx at this time;  he understand the risk of having clots    I have performed a physical exam and reviewed and updated ROS and Plan today (3/18/2023). In review of yesterday's note (3/17/2023), there are no changes except as documented above.

## 2023-03-19 NOTE — DISCHARGE PLANNING
Case Management Discharge Planning    Admission Date: 2/28/2023  GMLOS: 9.6  ALOS: 19    6-Clicks ADL Score: 14  6-Clicks Mobility Score: 11  PT and/or OT Eval ordered: Yes  Post-acute Referrals Ordered: Yes  Post-acute Choice Obtained: Yes  Has referral(s) been sent to post-acute provider:  Yes      Anticipated Discharge Dispo: Discharge Disposition: Disch to  rehab facility or distinct part unit (62)    DME Needed: No    Action(s) Taken: Updated Provider/Nurse on Discharge Plan    Escalations Completed: None    Medically Clear: Yes    Next Steps: Called [facility] to follow up. [facility] will accept but [facility] will not auth. Rehab continues to follow, [facility] has approved Rehab but physiatrist at Rehab has concerns about pain management. May require leadership intervention.    Barriers to Discharge: Pending Placement    Is the patient up for discharge tomorrow: No

## 2023-03-19 NOTE — PROGRESS NOTES
Hospital Medicine Daily Progress Note    Date of Service  3/19/2023    Chief Complaint  Richard Hubbard II is a 47 y.o. male admitted 2/28/2023 with cellulitis and sepsis    Hospital Course  This is a 47-year-old male with a past medical history significant for rheumatoid arthritis on Humira, history of planovalgus deformities and foot ulcers with recent MRI concerning for osteomyelitis of the left navicular bone presented ER on 2/28 with altered mental status, fever at 103 and hypoglycemia, worsening erythema of the left lower extremity.  Patient was then being admitted for sepsis secondary to left lower extremity cellulitis.    Considering patient being hypotensive and hypoglycemic patient was admitted to [facility].  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  ID has evaluated the patient during the stay in the hospital and recommended continuing antibiotics till 3/15/2023; patient has completed IV antibiotics course at this time.    PT/OT evaluate the patient medical and postacute, physiatry evaluate the patient emergency patient is a good candidate for acute rehab.      Patient does have acute on chronic pain secondary to recent BKA.  He has been seen in the past by pain specialist.  Currently patient has been taking duloxetine 60 mg p.o. daily, morphine IR 30 mg every 6 hours, MS Contin 30 twice daily, Lyrica 150 3 times daily.  Patient has been medically stable to be discharged to acute rehab.  At this time patient is not septic as she completed antibiotic course.      Interval events:  --  No acute Events overnight, patient has been stable, patient is alert, awake, answering questions appropriately.  Patient continued to complain of pain but he reports that he had his pain is well controlled on oral medication.   -- Currently has been doing 30 mg of MS  Contin twice daily, 30 mg of MS every 6 hours, Lyrica 150 3 times daily, scheduled Tylenol, Robaxin, Lyrica 150 3 times daily  --Patient was seen by the pain specialist in the past  PT/OT has evaluate the patient medical postacute.  At this time patient medically stable to be discharged to acute rehab  __ Physiatry  continue to follow th patient       I have discussed this patient's plan of care and discharge plan at IDT rounds today with Case Management, Nursing, Nursing leadership, and other members of the IDT team.    Consultants/Specialty  critical care    Code Status  Full Code    Disposition  Patient is medically cleared for discharge., awaiting insurance auth  Anticipate discharge to rehab .  I have placed the appropriate orders for post-discharge needs.    Review of Systems  Review of Systems   Constitutional:  Positive for malaise/fatigue. Negative for fever.   HENT:  Negative for congestion and sore throat.    Eyes:  Negative for blurred vision.   Respiratory:  Negative for shortness of breath.    Cardiovascular:  Negative for chest pain and orthopnea.   Gastrointestinal:  Negative for diarrhea, heartburn and nausea.   Genitourinary:  Negative for dysuria.   Musculoskeletal:  Positive for joint pain. Negative for myalgias.   Skin:  Negative for rash.   Neurological:  Negative for focal weakness and headaches.   Psychiatric/Behavioral:  The patient is nervous/anxious.    All other systems reviewed and are negative.     Physical Exam  Temp:  [36.8 °C (98.2 °F)-37 °C (98.6 °F)] 37 °C (98.6 °F)  Pulse:  [78-92] 92  Resp:  [17] 17  BP: ()/(62-67) 102/62  SpO2:  [94 %-97 %] 96 %    Physical Exam  Vitals and nursing note reviewed.   Constitutional:       Appearance: Normal appearance. He is well-developed. He is not diaphoretic.   HENT:      Head: Normocephalic and atraumatic.   Eyes:      Extraocular Movements: Extraocular movements intact.      Pupils: Pupils are equal, round, and reactive to light.    Neck:      Vascular: No JVD.      Trachea: No tracheal deviation.   Cardiovascular:      Rate and Rhythm: Normal rate and regular rhythm.   Pulmonary:      Effort: Pulmonary effort is normal. No respiratory distress.      Breath sounds: No stridor.   Abdominal:      General: There is no distension.      Palpations: Abdomen is soft.      Hernia: A hernia (Soft and reducible) is present.   Musculoskeletal:         General: Swelling and tenderness present.      Cervical back: Neck supple.   Skin:     General: Skin is warm.      Findings: Erythema present.      Nails: There is no clubbing.   Neurological:      Mental Status: He is alert and oriented to person, place, and time.      Cranial Nerves: No cranial nerve deficit.      Motor: No abnormal muscle tone.   Psychiatric:         Behavior: Behavior normal.       Fluids    Intake/Output Summary (Last 24 hours) at 3/19/2023 1307  Last data filed at 3/19/2023 0907  Gross per 24 hour   Intake 600 ml   Output 3465 ml   Net -2865 ml         Laboratory  Recent Labs     03/18/23  0100   WBC 6.2   RBC 3.17*   HEMOGLOBIN 9.0*   HEMATOCRIT 29.2*   MCV 92.1   MCH 28.4   MCHC 30.8*   RDW 47.9   PLATELETCT 319   MPV 8.6*       Recent Labs     03/18/23  0100 03/18/23  1019 03/19/23  0416   SODIUM 128* 131* 127*   POTASSIUM 4.2 4.2 4.2   CHLORIDE 95* 96 94*   CO2 27 28 24   GLUCOSE 111* 71 91   BUN 6* 5* 8   CREATININE 0.48* 0.42* 0.50   CALCIUM 8.0* 8.1* 7.9*                       Imaging  IR-US GUIDED PIV   Final Result    Ultrasound-guided PERIPHERAL IV INSERTION performed by    qualified nursing staff as above.      [id number] & W/O LEFT   Final Result      1.  No evidence of left tibial or fibular osteomyelitis or fracture.      2.  Diffuse cellulitis      3.  Atrophy of the muscles of the lower leg with edema and patchy enhancement consistent with myositis. This involves the tibialis posterior muscle to the greatest degree.      4.  Some fluid seen superficial to  the medial head of the gastrocnemius muscle. There is no evidence of rim enhancement to suggest presence of abscess.      MR-FOOT-WITH & W/O LEFT   Final Result      1.  Again seen marrow edema involving the medial aspect of the navicular and the adjacent first cuneiform with some enhancement of that area with overlying soft tissue ulceration and some fluid within the overlying soft tissues consistent with    osteomyelitis versus reactive change. This is unchanged from comparison.      2.  Marrow edema with small nondisplaced trabecular fracture involving the first metatarsal head with enhancement of that area. Differential diagnosis includes nondisplaced stress/insufficiency fracture versus osteomyelitis.      3.  Again seen marrow edema involving the base of the second metatarsal with some abnormal enhancement with a small possible linear nondisplaced trabecular fracture extending through that area likely representing a small nondisplaced insufficiency    fracture. There is also arthropathy of that joint.      4.  Again seen marrow edema involving the cuneiforms and the adjacent metatarsals likely representing osteoarthritis.      5.  Osteoarthritis of the tibiotalar joint with chronic osteochondral injury of the lateral aspect of the talar dome. There is also osteoarthritis of the subtalar, talonavicular and calcaneocuboid articulations.      6.  Cellulitis involving the hindfoot most prominently medially.      7.  Prominent tenosynovitis of the tibialis posterior tendon and to a lesser degree the flexor digitorum longus and flexor hallucis tendons. This underlies the area of soft tissue ulceration and could potentially represent infectious tenosynovitis. This    is new from comparison.      8.  Pes planus and valgus deformity of the hindfoot.      CT-EXTREMITY, LOWER WITH LEFT   Final Result      1.  No evidence of acute osteomyelitis of the left lower extremity.      2.  No evidence of deep abscess formation in  the subcutaneous tissues.      US-EXTREMITY VENOUS LOWER UNILAT LEFT   Final Result      DX-TIBIA AND FIBULA LEFT   Final Result      1.  Diffuse soft tissue swelling, edema and/or cellulitis.   2.  No acute osseous abnormality.   3.  Osteopenia.      DX-FOOT-2- LEFT   Final Result      Severe osteopenia and flat foot deformity without evidence of acute displaced fracture      No definite radiographic evidence of osteomyelitis.      CT-HEAD W/O   Final Result      Head CT without contrast within normal limits. No evidence of acute cerebral infarction, hemorrhage or mass lesion.         DX-CHEST-PORTABLE (1 VIEW)   Final Result      No acute cardiac or pulmonary abnormalities are identified.             Assessment/Plan  * Sepsis (HCC)- (present on admission)  Assessment & Plan  Sepsis secondary to LLE tenosynovitis, group A strep  bacteremia  Continue cefazolin cefazolin  Duplex negative for DVT x-rays negative for acute bony pathology  Patient evaluated by orthopedics with nonsurgical management recommended and outpatient follow-up with Dr. Pryor  Continue wound care  Evaluated by ID      S/p BKA for source control    Vitamin D deficiency  Assessment & Plan  Will provide 50 K of ergocaliferol    Hypocalcemia  Assessment & Plan  On oral supplement    3/11 calcium 6.9, ionized calcium 1.1    Continue monitor    Immunocompromised state due to drug therapy (HCC)  Assessment & Plan  Has been maintained on Humira and intermittently on prednisone     Bacteremia  Assessment & Plan  Group A strep bacteremia  Repeated culture 2/28 negative  During the stay in the hospital, I will continue to follow the patient, patient has completed IV antibiotics on 3/15/2023    Tenosynovitis of left lower leg  Assessment & Plan  MRI showed tenosynovitis of the tibialis posterior tendon, flexor digitorum longus and hallucis,     S/p left BKA 3/9,  Knee immobilizer to prevent flexion contracture    I discussed with ID, continue  antibiotics to complete a 7 days course, okay to transition Ancef to Augmentin 875 mg twice daily    3/12 S/p left BKA 3/9, NWB LLE  Needs dressing change every other day, was changed yesterday, required IV Dilaudid  Sutures removal in 14 to 21 days postoperation    Yeast dermatitis  Assessment & Plan  Started on Diflucan and topical clotrimazole  Skin care    Electrolyte abnormality  Assessment & Plan  Hypokalemia  Hypomagnesemia  Hypophosphatemia  Hypocalcemia    Likely component of refeeding syndrome patient started on thiamine supplements  Replele electrolytes as needed    Hyponatremia  Assessment & Plan  Likely 2/2 hypovolumic hyponatremia   Improving    --Today sodium is 127, continue IV fluid    Thrombocytopenia (HCC)  Assessment & Plan  Platelet counts 85K likely reactive secondary to sepsis  Now reslved , platelet at 319    Hypoglycemia  Assessment & Plan  Likely secondary to poor intake  Hypoglycemia protocol   --- encourage good po intake    Ulcer of both feet (HCC)- (present on admission)  Assessment & Plan  Wound care  CT negative for abscess  MRI concerning for osteomyelitis patient evaluated by LPS continue wound care  Continue Ancef  Discussed with ID Dr. Galdamez recommends transitioning to p.o. Zyvox on discharge through 3/9/2023 followed by p.o. amoxicillin 1 g twice daily for 3 to 4 weeks and outpatient follow-up with ID and orthopedics  MRI performed of the left knee through foot yesterday with new findings of tenosynovitis which may be the explanation for his persistent elevated temperatures and mild leukocytosis.  ID following and will cont on IV abx for now     3/10 S/p left BKA 3/9 Dr Chambers  PT and OT has evaluated and  recs post-acute   Physiatry has evaluate the patient, stated that patient is a good candidate for acute rehab    Opioid dependence (HCC)- (present on admission)  Assessment & Plan  With narcotic dependence    Patient reported that he uses contin 30mg bid and morphine 30  mg every 6 hours. However, I confirmed with pharmacy, it appears that his home dose is contin 15mg bid and morphine 15 mg every 6 hours   -- Does follow with ain specialist as an op    The dose were increased to contin 30mg bid and morphine 30 mg every 4 hours per his request and given his acute issuses  lyrica 100 tid   Duloxetine    History of immunosuppressive therapy- (present on admission)  Assessment & Plan  Home biweekly Humira and intermittent steroids   -- does follow Dr Conway as an op      Rheumatoid arthritis (HCC)- (present on admission)  Assessment & Plan  Has been maintained on Humira and intermittently on prednisone although no recent steroid use  Continue to hold Humira  Cortisol level 45    tapered off hydrocortisone    3/12 Reported a severe rheumatoid arthritis plan, May consider to resume RA medications since the infection has been controlled.   3/17: He needs to follow up with rheumatology as an op     History of bleeding ulcers  Assessment & Plan  Continue prilosec  Patient declines pharmacologic DVT prophylaxis discussed RBA         VTE prophylaxis: SCDs/TEDs    He has been declining chemical ppx at this time;  he understand the risk of having clots    I have performed a physical exam and reviewed and updated ROS and Plan today (3/19/2023). In review of yesterday's note (3/18/2023), there are no changes except as documented above.

## 2023-03-19 NOTE — CARE PLAN
The patient is Watcher - Medium risk of patient condition declining or worsening    Shift Goals  Clinical Goals: pain control, mobility, skin integrity  Patient Goals: edge of bed, oral care  Family Goals: not present    Progress made toward(s) clinical / shift goals:  yes    Problem: Pain - Standard  Goal: Alleviation of pain or a reduction in pain to the patient’s comfort goal  Outcome: Progressing     Problem: Hemodynamics  Goal: Patient's hemodynamics, fluid balance and neurologic status will be stable or improve  Outcome: Progressing     Problem: Fluid Volume  Goal: Fluid volume balance will be maintained  Outcome: Progressing     Problem: Urinary - Renal Perfusion  Goal: Ability to achieve and maintain adequate renal perfusion and functioning will improve  Outcome: Progressing     Problem: Respiratory  Goal: Patient will achieve/maintain optimum respiratory ventilation and gas exchange  Outcome: Progressing     Problem: Physical Regulation  Goal: Diagnostic test results will improve  Outcome: Progressing  Goal: Signs and symptoms of infection will decrease  Outcome: Progressing     Problem: Skin Integrity  Goal: Skin integrity is maintained or improved  Outcome: Progressing     Problem: Fall Risk  Goal: Patient will remain free from falls  Outcome: Progressing     Problem: Knowledge Deficit - Standard  Goal: Patient and family/care givers will demonstrate understanding of plan of care, disease process/condition, diagnostic tests and medications  Outcome: Progressing     Problem: Self Care  Goal: Patient will have the ability to perform ADLs independently or with assistance (bathe, groom, dress, toilet and feed)  Outcome: Progressing     Problem: Mobility  Goal: Patient's capacity to carry out activities will improve  Outcome: Progressing  Flowsheets (Taken 3/15/2023 1402)  Mobility:   Encouraged mobilization per interdisciplinary team recommendations   Monitored for signs of activity intolerance   Provided  assistive devices   Provided rest periods between activities   Administered pain management to allow progressive mobilization   Collaborated with PT/OT

## 2023-03-19 NOTE — PROGRESS NOTES
"    Physical Medicine and Rehabilitation Consultation  Follow up note         Date of initial consultation: 3/7/2023  Consulting provider: Corie Aranda M.D.  Reason for consultation: assess for acute inpatient rehab appropriateness  LOS: 19 Day(s)    Chief complaint: Left foot osteomyelitis    HPI: Per Dr. Pascual's consult note: \" The patient is a 47 y.o. right hand dominant male with a past medical history of reported arthritis on Humira with deformities and foot ulcers;  who presented on 2/28/2023 10:51 AM with MRI evidence of osteomyelitis of the left navicular bone fever of 103, hypoglycemia, altered mental status and cellulitis.  Patient was seen by orthopedic surgery, LPS and infectious disease.  He is currently on cefazolin 3 times daily through 3/12/2023.  Patient went marked left knee through foot yesterday which found tenosynovitis.  Patient will undergo possible surgical invention on 3/7  Chart review reveals that Mr. Hubbard is a prior patient of [facility], with his most recent admission in July 2020, where he attended rehab following right knee replacement and was successfully discharged home after 11 days.  The patient currently reports nausea, vomiting, chest pain, diarrhea, shortness of breath, 8 out of 5 pain in \"every joint\" most significant in his left shoulder, right wrist, right knee.  Patient has been n.p.o. today and is expected to go to surgery, he is unsure if he will be receiving a BKA or not today.  He understands this is a possibility.  Patient is very weak today and is quite painful.  Patient reports that typically he gets around with a four-wheel walker just fine, takes care of 2 kids, does chores etc.\"    3/16: interval events: patient was taken to the OR on 3/9 for L BKA performed by Dr. Chambers. Blood cultures have been  + for strep A. Patient has been on IV abx, but will be switched to augementin. Patient has been hyponatremic to 126. Chart review indicates history of " gastric bypass IN 2000, and poor absorption. This may impair patient's ability to absorb pain medications.  Patient previously a patient of Dr. Montero IN 2020, but switch to the [facility] office. He does not remember his pain doctor's name during my visit. Patient previously on Lyrica, but not currently on it.     Today patient tells me that his joints are extremely painful, reports that his pain is not located at his BKA site.  Reports that he has most significant pain in his hand joints, elbows, shoulders, knee and right ankle.  Patient has not been started on his Humira due to concern for immune suppression in setting of sepsis.  Denies phantom pain.       3/19: Interval events: patient has been transitioned to Morphine IR 30mg q4hrs PRN in addition to Morphine ER 30mg q12hrs. He is on an increased dose of lyrica. Patient seen and examined at bedside, he is not fatigued or somnolent. Fully awake and engaged in conversation. Is willing to try scheduled tylenol and higher dose lyrica. Reviewed with patient his high dose morphine, and that in order to come to rehab we need to secure confirmation from his pain office that they will be able to rx his current higher dose. Also discussed that patient's gut may have impaired ability to absorb the high dose of narcotics. Patient continues to express desire to go to rehab and willingness to function at  level if need be.      ROS  Pertinent positives are mentioned in the HPI, all others reviewed and are negative.    Social Hx:  1 SH  2 HASEEB  With: Spouse and 2 children ages 4 and 7.  Spouse works for [employer] as a pediatric psychologist.     Tobacco: Half pack per day  Alcohol: Denies  Drugs: Denies    THERAPY:  Restrictions: Fall risk  PT: Functional mobility   3/3: Max assist sit to stand  3/14 min assist x2 people for sit to stand, mod assist bed mobility, unable to participate functional gait  3/13 unable to participate in functional transfer  3/17 Mod A slide  board, was unable to participate in functional sit to stand      OT: ADLs  3/3: Max assist lower body dressing  3/13 total assist lower body dressing, mod assist bed mobility, unable to participate functional sit to stand  3/17 Min A bed mobility, Min A upper body dressing, Mod A lower body dressing     SLP:   None    IMAGING:  OK-[id number]-AIOREC-WITH & W/O LEFT   Final Result       1.  No evidence of left tibial or fibular osteomyelitis or fracture.       2.  Diffuse cellulitis       3.  Atrophy of the muscles of the lower leg with edema and patchy enhancement consistent with myositis. This involves the tibialis posterior muscle to the greatest degree.       4.  Some fluid seen superficial to the medial head of the gastrocnemius muscle. There is no evidence of rim enhancement to suggest presence of abscess.       MR-FOOT-WITH & W/O LEFT   Final Result       1.  Again seen marrow edema involving the medial aspect of the navicular and the adjacent first cuneiform with some enhancement of that area with overlying soft tissue ulceration and some fluid within the overlying soft tissues consistent with    osteomyelitis versus reactive change. This is unchanged from comparison.       2.  Marrow edema with small nondisplaced trabecular fracture involving the first metatarsal head with enhancement of that area. Differential diagnosis includes nondisplaced stress/insufficiency fracture versus osteomyelitis.       3.  Again seen marrow edema involving the base of the second metatarsal with some abnormal enhancement with a small possible linear nondisplaced trabecular fracture extending through that area likely representing a small nondisplaced insufficiency    fracture. There is also arthropathy of that joint.       4.  Again seen marrow edema involving the cuneiforms and the adjacent metatarsals likely representing osteoarthritis.       5.  Osteoarthritis of the tibiotalar joint with chronic osteochondral injury of the lateral  aspect of the talar dome. There is also osteoarthritis of the subtalar, talonavicular and calcaneocuboid articulations.       6.  Cellulitis involving the hindfoot most prominently medially.       7.  Prominent tenosynovitis of the tibialis posterior tendon and to a lesser degree the flexor digitorum longus and flexor hallucis tendons. This underlies the area of soft tissue ulceration and could potentially represent infectious tenosynovitis. This    is new from comparison.       8.  Pes planus and valgus deformity of the hindfoot.       CT-EXTREMITY, LOWER WITH LEFT   Final Result       1.  No evidence of acute osteomyelitis of the left lower extremity.       2.  No evidence of deep abscess formation in the subcutaneous tissues.       PROCEDURES:  None    PMH:  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    Hemorrhagic disorder ([facility])     hx of bleeding ulcers    History of bleeding ulcers 2/12/2015    Hypertension 05/03/2021    pt states well controlled on meds    Insomnia 5/21/2009    Migraine 5/21/2009    Morning headache     Obesity, morbid ([facility]) 5/21/2009    Osteomyelitis ([facility]) 10/7/2019    Pain 09/2019    Chronic knee and back pain    Pubic ramus fracture ([facility]) 9/28/2019    Rheumatoid arteritis ([facility]) 09/2019    knee, feet right hand    Sleep apnea 5/21/2009    Did not tolerate cpap, does not use it    Snoring        PSH:  Past Surgical  History:   Procedure Laterality Date    KNEE AMPUTATION BELOW Left 3/9/2023    Procedure: AMPUTATION, BELOW KNEE;  Surgeon: Wayne Chambers M.D.;  Location: SURGERY [facility];  Service: Orthopedics    PB TOTAL KNEE ARTHROPLASTY Right 7/22/2020    Procedure: ARTHROPLASTY, KNEE, TOTAL;  Surgeon: Temo Pires M.D.;  Location: SURGERY [facility];  Service: Orthopedics    TENDON TRANSFER Right 1/22/2020    Procedure: RIGHT DISTAL ULNA RESECTION AND EXTENSOR TENDON TRANSFER;  Surgeon: Marine Rushing M.D.;  Location: SURGERY [facility];  Service: Orthopedics    OTHER ORTHOPEDIC SURGERY Right 2020    total right knee replacement    IRRIGATION & DEBRIDEMENT ORTHO Left 10/9/2019    Procedure: IRRIGATION AND DEBRIDEMENT, WOUND - PELVIC OSTEOMYELITIS;  Surgeon: You Olivares M.D.;  Location: SURGERY [facility];  Service: Orthopedics    OTHER SURGICAL PROCEDURE  2019    osteomelitis of pelvis cleaned    GASTRIC BYPASS LAPAROSCOPIC  2000    Lucila en y    CHOLECYSTECTOMY  2000    OTHER SURGICAL PROCEDURE      repair of broken penis       FHX:    Family History   Problem Relation Age of Onset    Hypertension Mother     Arthritis Mother     Depression Mother     Cancer Father         bladder    Schizophrenia Father     Cancer Maternal Grandmother         breast    Heart Disease Maternal Grandmother     Psychiatric Illness Other     Stroke Maternal Aunt     Other Neg Hx         no connective tissue disorders or known aortic disease       Medications:  Current Facility-Administered Medications   Medication Dose    pregabalin (LYRICA) capsule 100 mg  100 mg    sodium chloride (OCEAN) 0.65 % nasal spray 2 Spray  2 Spray    morphine (MS IR) tablet 30 mg  30 mg    methocarbamol (ROBAXIN) tablet 750 mg  750 mg    vitamin D2 (Ergocalciferol) (Drisdol) capsule 50,000 Units  50,000 Units    carboxymethylcellulose (REFRESH TEARS) 0.5 % ophthalmic drops 1 Drop  1 Drop    morphine ER (MS CONTIN) tablet 30 mg  30  "mg    calcium citrate (CALCITRATE) tablet 950 mg  950 mg    thiamine (Vitamin B-1) tablet 100 mg  100 mg    zolpidem (AMBIEN) tablet 5 mg  5 mg    nicotine (NICODERM) 7 MG/24HR 7 mg  7 mg    omeprazole (PRILOSEC) capsule 40 mg  40 mg    senna-docusate (PERICOLACE or SENOKOT S) 8.6-50 MG per tablet 2 Tablet  2 Tablet    And    polyethylene glycol/lytes (MIRALAX) PACKET 1 Packet  1 Packet    And    magnesium hydroxide (MILK OF MAGNESIA) suspension 30 mL  30 mL    And    bisacodyl (DULCOLAX) suppository 10 mg  10 mg    lactated ringers infusion (BOLUS)  500 mL    acetaminophen (Tylenol) tablet 650 mg  650 mg    Pharmacy Consult Request ...Pain Management Review 1 Each  1 Each    dextrose 10 % BOLUS 25 g  25 g       Allergies:  Allergies   Allergen Reactions    Benadryl [Altaryl]      \"I get agitated\"    Nsaids      Bleeding, \"I had a bleeding ulcer\"    Phenergan [Promethazine Hcl]      \"I get very agitated\"    Penicillins      \"Had some dizziness\"  Tolerated Unasyn 10/2019  Tolerated Zosyn 02/2023         Physical Exam:  Vitals: /62   Pulse 92   Temp 37 °C (98.6 °F) (Temporal)   Resp 17   Ht 1.575 m (5' 2.01\")   Wt 97.3 kg (214 lb 8.1 oz)   SpO2 96%   Gen: NAD, laying comfortably in bed  Head: + Temporal wasting, NC/AT  Eyes/ Nose/ Mouth: PERRLA, moist mucous membranes  Cardio: RRR, good distal perfusion, warm extremities  Pulm: normal respiratory effort, no cyanosis, on room air  Abd: Soft NTND,  loose skin through patient's arms abdomen and legs  Ext: No peripheral edema. No calf tenderness. No clubbing.  + Left BKA, dressing clean dry and intact  Mood: Not tearful during visit, awake, engaged, eager to discuss rehab     Mental status:  A&Ox4 (person, place, date, situation) answers questions appropriately follows commands  Speech: fluent, no aphasia or dysarthria    CRANIAL NERVES:  2,3: visual acuity grossly intact, PERRL  3,4,6: EOMI bilaterally, no nystagmus or diplopia  5: sensation intact to light " touch bilaterally and symmetric  7: no facial asymmetry  8: hearing grossly intact      Motor:      Upper Extremity  Myotome R L   Shoulder flexion C5 3/5 3, limited by joint pain/5   Elbow flexion C5 4/5 4/5   Wrist extension C6 4/5 4/5   Elbow extension C7 4/5 4/5   Finger flexion C8 4/5 4/5   Finger abduction T1 4/5 4/5     Lower Extremity Myotome R L   Hip flexion L2 5/5 3/5   Knee extension L3 5/5 3/5   Ankle dorsiflexion L4 4, limited by pain /5 NA   Toe extension L5 4/5 NA   Ankle plantarflexion S1 4/5 NA            Labs: Reviewed and significant for   Recent Labs     03/18/23  0100   RBC 3.17*   HEMOGLOBIN 9.0*   HEMATOCRIT 29.2*   PLATELETCT 319       Recent Labs     03/18/23  0100 03/18/23  1019 03/19/23  0416   SODIUM 128* 131* 127*   POTASSIUM 4.2 4.2 4.2   CHLORIDE 95* 96 94*   CO2 27 28 24   GLUCOSE 111* 71 91   BUN 6* 5* 8   CREATININE 0.48* 0.42* 0.50   CALCIUM 8.0* 8.1* 7.9*       Recent Results (from the past 24 hour(s))   POCT glucose device results    Collection Time: 03/18/23  4:54 PM   Result Value Ref Range    POC Glucose, Blood 81 65 - 99 mg/dL   POCT glucose device results    Collection Time: 03/19/23  1:06 AM   Result Value Ref Range    POC Glucose, Blood 92 65 - 99 mg/dL   Basic Metabolic Panel    Collection Time: 03/19/23  4:16 AM   Result Value Ref Range    Sodium 127 (L) 135 - 145 mmol/L    Potassium 4.2 3.6 - 5.5 mmol/L    Chloride 94 (L) 96 - 112 mmol/L    Co2 24 20 - 33 mmol/L    Glucose 91 65 - 99 mg/dL    Bun 8 8 - 22 mg/dL    Creatinine 0.50 0.50 - 1.40 mg/dL    Calcium 7.9 (L) 8.5 - 10.5 mg/dL    Anion Gap 9.0 7.0 - 16.0   Renal Function Panel    Collection Time: 03/19/23  4:16 AM   Result Value Ref Range    Phosphorus 4.3 2.5 - 4.5 mg/dL    Albumin 2.2 (L) 3.2 - 4.9 g/dL   CORRECTED CALCIUM    Collection Time: 03/19/23  4:16 AM   Result Value Ref Range    Correct Calcium 9.3 8.5 - 10.5 mg/dL   ESTIMATED GFR    Collection Time: 03/19/23  4:16 AM   Result Value Ref Range    GFR  (CKD-EPI) 127 >60 mL/min/1.73 m 2   POCT glucose device results    Collection Time: 03/19/23  6:36 AM   Result Value Ref Range    POC Glucose, Blood 92 65 - 99 mg/dL         ASSESSMENT:  Patient is a 47 y.o. male admitted with left foot osteomyelitis now on IV antibiotics, pending surgery    [facility] Code / Diagnosis to Support: 0017.8 - Medically Complex: Medical/Surgical Complications    Rehabilitation: Impaired ADLs and mobility  Patient will need IPR when all surgeries have been completed, and patient is medically cleared and able to tolerate IPR.  Patient has been to [facility] twice in the past, had a positive experience both times, and is wanting to return to continue rehab following this admission    All cases are subject to administrative review and recommendations may change    Disposition recommendations:  Dispo:  - patient is currently functioning below their level of baseline, recommend post acute rehab  -Currently, patient is not a candidate for inpatient rehab due to his IV pain meds, discussed with Hospitalist   - If patient shows improved endurance with therapy and ability to have pain controlled on oral pain meds patient would be a candidate for IRF level therapy  - Patient is mostly limited by his rheumatoid arthritis pain, he is not limited in function due to his left BKA  - Patient's pain in his shoulders, elbows, hands and right knee need to be controlled in order for patient to improve functionally  - PM&R will continue to follow closely      Medical Complexity:  Left BKA  -Originally presented with left lower extremity osteomyelitis  - Infectious diseases consulted, completed IV antibiotics 3/15  - Orthopedic surgery consulted, patient taken to the OR on 3/9 for left BKA performed by Dr. Chambers   -Left BKA stump pain appears to be controlled, denies phantom pain  - Limited with PT/OT due to significant joint pain due to rheumatoid arthritis  -Recommend  continuing to attempt slide board  transfers, as I anticipate patient will be  K1 level amputee due to his significant RA in his RLE     Chronic pain  Rheumatoid arthritis  -Previously followed by Dr. Gutierrez for chronic pain after inpatient rehab in 2020,   - switched pain providers, is now followed by [facility]; per , patient is followed by Ace Lee MD and Dipika Bailey NP  (Morpine ER 15mg BID)   - Previously on biweekly Humira, cannot be restarted on Humira at this time due to concern for immune suppression status post sepsis  - Currently requiring IV Dilaudid for pain control  - Additionally on oral MS Contin 30 mg every 12 hours  - Previously on Lyrica, I have stopped patient's gabapentin, I have restarted Lyrica 75 mg 3 times daily  - 3/19 patient is on lyrica 100mg TID + MS contin 30mg q12hrs and + Morphine IR 30mg q 4hrs, averaging approx 160 -190 morphine equivalents per day ;   changed dosage to schedule Tylenol and Lyrica 150mg TID     History of gastric bypass  - Gastric bypass, Lucila-en-Y surgery in 2020  - Dietitian consult ordered    History of bleeding ulcers  - Patient unlikely to tolerate NSAIDs for rheumatoid arthritic pain, history of bleeding ulcers  - Hemoglobin currently stable at 9.1      DVT PPX: SCDs      Thank you for allowing us to participate in the care of this patient.     Patient was seen for 36 minutes on unit/floor of which > 50% of time was spent on counseling and coordination of care regarding the above, including prognosis, risk reduction, benefits of treatment, and options for next stage of care, including discussing pain medication dosage with Hospitalist.     Angelique Cody D.O.    Physical Medicine and Rehabilitation     Please note that this dictation was created using voice recognition software. I have made every reasonable attempt to correct obvious errors, but there may be errors of grammar and possibly content that I did not discover before finalizing the note.
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
## 2023-03-19 NOTE — CARE PLAN
The patient is Stable - Low risk of patient condition declining or worsening    Shift Goals  Clinical Goals: pain control, BM  Patient Goals: BM, pain control, rest  Family Goals: not present    Progress made toward(s) clinical / shift goals:    Problem: Pain - Standard  Goal: Alleviation of pain or a reduction in pain to the patient’s comfort goal  Outcome: Progressing     Problem: Hemodynamics  Goal: Patient's hemodynamics, fluid balance and neurologic status will be stable or improve  Outcome: Progressing     Problem: Skin Integrity  Goal: Skin integrity is maintained or improved  Outcome: Progressing       Patient is not progressing towards the following goals:

## 2023-03-20 NOTE — PREADMISSION SCREENING NOTE
"  Pre-Admission Screening Form    Patient Information:   Name: Richard Hubbard II     MRN: [id number]       : 1975      Age: 47 y.o.   Gender: male      Race: White [7]       Marital Status:  [2]  Family Contact: Katiana Hubbard Linda        Relationship: Spouse [17]  Mother [8]  Home Phone: [phone]           Cell Phone: [phone]  Advanced Directives: None  Code Status:  FULL  Current Attending Provider: Gil Dalton M.D.  Referring Physician: Dr. Aranda  Physiatrist Consult: Dr. Cody   Referral Date: 23  Primary Payor Source:  [facility]  Secondary Payor Source:      Medical Information:   Date of Admission to Acute Care [place]  Room Number: T315/02  Rehabilitation Diagnosis: 0017.8 - Medically Complex: Medical/Surgical Complications  Immunization History   Administered Date(s) Administered    Influenza Vaccine Quad Inj (Pf) 2017, 2018, 10/17/2019, 2020, 2021    Influenza Vaccine Quad Inj (Preserved) 2015, 2016    Influenza, Unspecified - HISTORICAL DATA 2020    MODERNA SARS-COV-2 VACCINE (12+) 2021, 2021, 2021    Tdap Vaccine 2020     Allergies   Allergen Reactions    Benadryl [Altaryl]      \"I get agitated\"    Nsaids      Bleeding, \"I had a bleeding ulcer\"    Phenergan [Promethazine Hcl]      \"I get very agitated\"    Penicillins      \"Had some dizziness\"  Tolerated Unasyn 10/2019  Tolerated Zosyn 2023     Past Medical History:   Diagnosis Date    ADD (attention deficit disorder)     Alcohol abuse     Allergic rhinitis 2009    Anemia 2019    Anxiety 2009    Apnea, sleep     Arrhythmia     afib when hospitaized for infection    Back pain 2009    Bipolar disorder (HCC)     Bleeding ulcer 2009    Bleeding ulcer 2009    History of anemia-now on nexium     Bowel habit changes     constipation related to narcotics    Daytime " sleepiness     Depression 5/21/2009    Eczema 5/21/2009    GERD (gastroesophageal reflux disease)     Heart burn     on meds    Heart murmur     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    Hemorrhagic disorder (HCC)     hx of bleeding ulcers    History of bleeding ulcers 2/12/2015    Hypertension 05/03/2021    pt states well controlled on meds    Insomnia 5/21/2009    Migraine 5/21/2009    Morning headache     Obesity, morbid (HCC) 5/21/2009    Osteomyelitis ([facility]) 10/7/2019    Pain 09/2019    Chronic knee and back pain    Pubic ramus fracture ([facility]) 9/28/2019    Rheumatoid arteritis ([facility]) 09/2019    knee, feet right hand    Sleep apnea 5/21/2009    Did not tolerate cpap, does not use it    Snoring      Past Surgical History:   Procedure Laterality Date    KNEE AMPUTATION BELOW Left 3/9/2023    Procedure: AMPUTATION, BELOW KNEE;  Surgeon: Wayne Chambers M.D.;  Location: [facility];  Service: Orthopedics    PB TOTAL KNEE ARTHROPLASTY Right 7/22/2020    Procedure: ARTHROPLASTY, KNEE, TOTAL;  Surgeon: Temo Pires M.D.;  Location: SURGERY [facility];  Service: Orthopedics    TENDON TRANSFER Right 1/22/2020    Procedure: RIGHT DISTAL ULNA RESECTION AND EXTENSOR TENDON TRANSFER;  Surgeon: Marine Rushing M.D.;  Location: [facility];  Service: Orthopedics    OTHER ORTHOPEDIC SURGERY Right 2020    total right knee replacement    IRRIGATION & DEBRIDEMENT ORTHO Left 10/9/2019    Procedure: IRRIGATION AND DEBRIDEMENT, WOUND - PELVIC OSTEOMYELITIS;  Surgeon: You Olivares M.D.;  Location: SURGERY [facility];  Service: Orthopedics    OTHER SURGICAL PROCEDURE  2019    osteomelitis of pelvis cleaned    GASTRIC BYPASS LAPAROSCOPIC  2000    Lucila en y    CHOLECYSTECTOMY  2000    OTHER SURGICAL PROCEDURE      repair of broken penis       History Leading to Admission, Conditions that Caused the Need for Rehab (CMS):     Dr. Solomon Hill H&P:  Chief Complaint  Patient presents with   Flu Like  Symptoms      X 3 days, patient reports malaise, fever, and diarrhea    Low Blood Sugar      40 per EMS. Patient given 250 cc D10. FSBS up to 111   Leg Swelling      Redness and warmth to the LLE. Chronic wounds to bilateral feet      History of Presenting Illness  Richard Hubbard II is a 47 y.o. male who presented 2/28/2023 with history of rheumatoid arthritis on Humira with history of planovalgus deformities and foot ulcers with recent MRI concerning for osteomyelitis of the left navicular bone presented to the emergency department for evaluation of confusion and fever of 103 associated with hypoglycemia and worsening redness of the lower extremities more so on the left lower extremity.  Patient is confused and unable to provide any history.  Wife at the bedside reports that he has not been feeling well for the past 2 to 3 days has had intermittent diarrhea nonproductive cough.  He has a history of chronic pain syndrome with narcotic dependence.  The patient is not diabetic and has not had any prior history of hypoglycemia.  According to the wife he has been in the past on prednisone for rheumatoid arthritis flares but has not taken any over the past month.  No headache no loss of consciousness.  Patient was noted to be hypotensive and hypoglycemic received D5 infusion and vancomycin and Zosyn and clindamycin in the emergency department.     Assessment/Plan:  Justification for Admission Status  I anticipate this patient will require at least two midnights for appropriate medical management, necessitating inpatient admission because cellulitis with sepsis     Patient will need a Intermediate Care (Adult and Pediatrics) bed on MEDICAL service .  The need is secondary to hypoglycemia sepsis with marginal blood pressure.     * Sepsis (HCC)- (present on admission)  Assessment & Plan  This is Sepsis Present on admission  SIRS criteria identified on my evaluation include: Fever, with temperature greater than 101  deg F, Tachycardia, with heart rate greater than 90 BPM and Leukocytosis, with WBC greater than 12,000  Source is soft tissue and possible osteomyelitis  Sepsis protocol initiated  Fluid resuscitation ordered per protocol  Crystalloid Fluid Administration: Fluid resuscitation ordered per standard protocol - 30 mL/kg per current or ideal body weight  IV antibiotics as appropriate for source of sepsis  Reassessment: I have reassessed the patient's hemodynamic status     Patient will be empirically started on IV Zosyn and zyvox  Patient has a listed penicillin allergy but pharmacy notes that he tolerated Unasyn in the past and he tolerated his dose of Zosyn in the emergency department  Follow-up on cultures and de-escalate antibiotics accordingly  Patient evaluated by orthopedics with plan for CT and further rec recommendations for possible BKA per orthopedics     Duplex negative for DVT x-rays negative for acute bony pathology     Lactic acidosis  Assessment & Plan  IV hydration and monitor     Hyponatremia  Assessment & Plan  Likely secondary to dehydration IV fluids and monitor sodium levels        Thrombocytopenia (HCC)  Assessment & Plan  Mild monitor CBC     Encephalopathy  Assessment & Plan  Acute encephalopathy secondary to sepsis and hypoglycemia     No clinical signs of meningitis  Close clinical monitoring and frequent orientation  CT head reviewed negative for acute pathology     Hypoglycemia  Assessment & Plan  Etiology not clear possibly secondary to poor intake  Hypoglycemia protocol  D5 half NS infusion  Monitor CBGs     Ulcer of both feet (HCC)- (present on admission)  Assessment & Plan  Wound care  F/u on CT     Chronic use of opiate drug for therapeutic purpose- (present on admission)  Assessment & Plan  With narcotic dependence  We will hold MS Contin at this time given hypotension   Judicious use of narcotics with close monitoring     History of immunosuppressive therapy- (present on  admission)  Assessment & Plan  On Humira and intermittent steroids     Rheumatoid arthritis (HCC)- (present on admission)  Assessment & Plan  Has been maintained on Humira and intermittently on prednisone although no recent steroid use  Given hypoglycemia and sepsis we will check cortisol level and empirically start him on stress dose hydrocortisone     History of bleeding ulcers  Assessment & Plan  Continue prilosec     VTE prophylaxis: SCDs/TEDs    Dr. Colindres (Surgery Orthopedic) recommendations:  Impression: 47-year-old male history of bilateral lower extremity wounds with recent onset of worsening cellulitis and signs of sepsis.  Plan for urgent CT scan.  Please keep n.p.o. for possible below-knee amputation of the left lower extremity today.  We will follow-up CT scan.     Plan:We discussed the diagnosis and findings with the patient at length.  We reviewed possible non operative and operative interventions and the risks and benefits of each of these.  he had a chance to ask questions and all of these were answered to his satisfaction. The patient chose to proceed with surgical intervention. Risks and benefits of surgery were discussed which include but are not limited to bleeding, infection, neurovascular damage, malunion, nonunion, instability, limb length discrepancy, DVT, PE, MI, Stroke and death. They understand these risks and wish to proceed.      Dr. Galdamez (Infectious Disease) recommendations:  Hospital Course/Assessment:  Richard Hubbard II is a 47 y.o. male patient with rheumatoid arthritis with rheumatoid nodules on Humira, history of planovalgus deformities and chronic bilateral plantar foot ulcers since early 2022.  Patient was being evaluated at the [facility], plan was for outpatient MRI but this was delayed, eventually obtained on 1/28/2023, found to have marrow edema involving the navicular,  cuboid, first through third cuneiforms, 1st-3rd proximal metatarsals, concerning for osteomyelitis.   He was advised reconstructive surgeries but this has been delayed due to social issues.  About a week ago, patient began to experience worsening redness and swelling of the left lower extremity.  He presented to the ER.  Tmax is 101, no leukocytosis.  Blood cultures x2 from 2/28 positive for group A Strep.  Blood cultures from later that same day negative thus far.  CT scan with no obvious abscess.  Orthopedics outpatient, no plans for surgery while inpatient.     Pertinent Diagnoses:  Group A Strep bacteremia  Left leg cellulitis, improved  Left foot chronic osteomyelitis  Left-sided tinea cruris  Rheumatoid arthritis  Chronic foot deformities, chronic bilateral foot ulcers  Immunosuppressed status     Plan:  -Continue IV Ancef 2 g every 8 hours  -Follow-up with blood cultures x2 from 2/28, no growth till date  -Patient has follow-up with Dr. Pryor in 1-2 weeks.  If cellulitis improvement continues, anticipate completion of sepsis treatment with p.o. linezolid 600 mg twice daily through 3/9/2023, followed by switch to p.o. amoxicillin 1 g twice daily for suppression in order to prevent recurrent sepsis while awaiting surgical intervention of the foot.  Definitive antibiotic therapy currently has not been established     Disposition: Anticipate no ID specific disposition needs  Need for PICC line: No     Plan was discussed with the primary team, Dr. Solomon Arreola     ID clinic follow-up in    Dr. Cody (Physiatry) recommendations:  ASSESSMENT:  Patient is a 47 y.o. male admitted with left foot osteomyelitis now on IV antibiotics, pending surgery     [facility] Code / Diagnosis to Support: 0017.8 - Medically Complex: Medical/Surgical Complications     Rehabilitation: Impaired ADLs and mobility  Patient will need IPR when all surgeries have been completed, and patient is medically cleared and able to tolerate IPR.  Patient has been [facility] in the past, had a positive experience both times, and is wanting to  return to continue rehab following this admission     All cases are subject to administrative review and recommendations may change     Disposition recommendations:  Dispo:  - patient is currently functioning below their level of baseline, recommend post acute rehab  -Currently, patient is not a candidate for inpatient rehab due to his IV pain meds, discussed with Hospitalist   - If patient shows improved endurance with therapy and ability to have pain controlled on oral pain meds patient would be a candidate for IRF level therapy  - Patient is mostly limited by his rheumatoid arthritis pain, he is not limited in function due to his left BKA  - Patient's pain in his shoulders, elbows, hands and right knee need to be controlled in order for patient to improve functionally  - PM&R will continue to follow closely     Medical Complexity:  Left BKA  -Originally presented with left lower extremity osteomyelitis  - Infectious diseases consulted, completed IV antibiotics 3/15  - Orthopedic surgery consulted, patient taken to the OR on 3/9 for left BKA performed by Dr. Chambers   -Left BKA stump pain appears to be controlled, denies phantom pain  - Limited with PT/OT due to significant joint pain due to rheumatoid arthritis  -Recommend  continuing to attempt slide board transfers, as I anticipate patient will be  K1 level amputee due to his significant RA in his RLE      Chronic pain  Rheumatoid arthritis  -Previously followed by Dr. Gutierrez for chronic pain after inpatient rehab in 2020,   - switched pain providers, is now followed by [facility]; per , patient is followed by Ace Lee MD and Dipika Bailey NP  (Morpine ER 15mg BID)   - Previously on biweekly Humira, cannot be restarted on Humira at this time due to concern for immune suppression status post sepsis  - Currently requiring IV Dilaudid for pain control  - Additionally on oral MS Contin 30 mg every 12 hours  - Previously on Lyrica, I have stopped  patient's gabapentin, I have restarted Lyrica 75 mg 3 times daily  - 3/19 patient is on lyrica 100mg TID + MS contin 30mg q12hrs and + Morphine IR 30mg q 4hrs, averaging approx 160 -190 morphine equivalents per day ;   changed dosage to schedule Tylenol and Lyrica 150mg TID      History of gastric bypass  - Gastric bypass, Lucila-en-Y surgery in 2020  - Dietitian consult ordered     History of bleeding ulcers  - Patient unlikely to tolerate NSAIDs for rheumatoid arthritic pain, history of bleeding ulcers  - Hemoglobin currently stable at 9.1     DVT PPX: SCDs    Dr. Dalton progress note 03-19-23:  Chief Complaint  Richard Hubbard II is a 47 y.o. male admitted 2/28/2023 with cellulitis and sepsis     Hospital Course  This is a 47-year-old male with a past medical history significant for rheumatoid arthritis on Humira, history of planovalgus deformities and foot ulcers with recent MRI concerning for osteomyelitis of the left navicular bone presented ER on 2/28 with altered mental status, fever at 103 and hypoglycemia, worsening erythema of the left lower extremity.  Patient was then being admitted for sepsis secondary to left lower extremity cellulitis.  Considering patient being hypotensive and hypoglycemic patient was admitted to IMCU.  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  ID has evaluated the patient during the stay in the hospital and recommended continuing antibiotics till 3/15/2023; patient has completed IV antibiotics course at this time.     PT/OT evaluate the patient medical and postacute, physiatry evaluate the patient emergency patient is a good candidate for acute rehab.       Patient does have acute on chronic pain secondary to recent BKA.  He has been seen in the past by pain specialist.  Currently patient has been taking duloxetine 60 mg  p.o. daily, morphine IR 30 mg every 6 hours, MS Contin 30 twice daily, Lyrica 150 3 times daily.  Patient has been medically stable to be discharged to acute rehab.  At this time patient is not septic as she completed antibiotic course.his is a 47-year-old male with a past medical history significant for rheumatoid arthritis on Humira, history of planovalgus deformities and foot ulcers with recent MRI concerning for osteomyelitis of the left navicular bone presented ER on 2/28 with altered mental status, fever at 103 and hypoglycemia, worsening erythema of the left lower extremity.  Patient was then being admitted for sepsis secondary to left lower extremity cellulitis.     Considering patient being hypotensive and hypoglycemic patient was admitted to [facility].  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  ID recommended completing antibiotics for 7 days to treat residual cellulitis chills 3/15/2023.  Patient can be discharged on Augmentin 875/121 tablet p.o. twice daily.      PT/OT evaluate the patient medical and postacute, physiatry has been evaluating the patient     Interval events:  --  No acute Events overnight, patient has been stable, patient is alert, awake, answering questions appropriately.  Patient continued to complain of pain but he reports that he had his pain is well controlled on oral medication.   -- Currently has been doing 30 mg of MS Contin twice daily, 30 mg of MS every 6 hours, Lyrica 150 3 times daily, scheduled Tylenol, Robaxin, Lyrica 150 3 times daily  --Patient was seen by the pain specialist in the past  PT/OT has evaluate the patient medical postacute.  At this time patient medically stable to be discharged to acute rehab  __ Physiatry  continue to follow th patient      I have discussed this patient's plan of care and discharge plan at  IDT rounds today with Case Management, Nursing, Nursing leadership, and other members of the IDT team.     Consultants/Specialty  critical care     Code Status  Full Code     Disposition  Patient is medically cleared for discharge., awaiting insurance auth  Anticipate discharge to rehab .  I have placed the appropriate orders for post-discharge needs.     Review of Systems  Review of Systems   Constitutional:  Positive for malaise/fatigue. Negative for fever.   HENT:  Negative for congestion and sore throat.    Eyes:  Negative for blurred vision.   Respiratory:  Negative for shortness of breath.    Cardiovascular:  Negative for chest pain and orthopnea.   Gastrointestinal:  Negative for diarrhea, heartburn and nausea.   Genitourinary:  Negative for dysuria.   Musculoskeletal:  Positive for joint pain. Negative for myalgias.   Skin:  Negative for rash.   Neurological:  Negative for focal weakness and headaches.   Psychiatric/Behavioral:  The patient is nervous/anxious.    All other systems reviewed and are negative.      Physical Exam  Temp:  [36.8 °C (98.2 °F)-37 °C (98.6 °F)] 37 °C (98.6 °F)  Pulse:  [78-92] 92  Resp:  [17] 17  BP: ()/(62-67) 102/62  SpO2:  [94 %-97 %] 96 %     Physical Exam  Vitals and nursing note reviewed.   Constitutional:       Appearance: Normal appearance. He is well-developed. He is not diaphoretic.   HENT:      Head: Normocephalic and atraumatic.   Eyes:      Extraocular Movements: Extraocular movements intact.      Pupils: Pupils are equal, round, and reactive to light.   Neck:      Vascular: No JVD.      Trachea: No tracheal deviation.   Cardiovascular:      Rate and Rhythm: Normal rate and regular rhythm.   Pulmonary:      Effort: Pulmonary effort is normal. No respiratory distress.      Breath sounds: No stridor.   Abdominal:      General: There is no distension.      Palpations: Abdomen is soft.      Hernia: A hernia (Soft and reducible) is present.   Musculoskeletal:          General: Swelling and tenderness present.      Cervical back: Neck supple.   Skin:     General: Skin is warm.      Findings: Erythema present.      Nails: There is no clubbing.   Neurological:      Mental Status: He is alert and oriented to person, place, and time.      Cranial Nerves: No cranial nerve deficit.      Motor: No abnormal muscle tone.   Psychiatric:         Behavior: Behavior normal.      Fluids     Intake/Output Summary (Last 24 hours) at 3/19/2023 1307  Last data filed at 3/19/2023 0907  Gross per 24 hour  Intake 600 ml  Output 3465 ml  Net -2865 ml    Laboratory  Recent Labs    03/18/23  0100  WBC 6.2  RBC 3.17*  HEMOGLOBIN 9.0*  HEMATOCRIT 29.2*  MCV 92.1  MCH 28.4  MCHC 30.8*  RDW 47.9  PLATELETCT 319  MPV 8.6*     Recent Labs    03/18/23  0100 03/18/23  1019 03/19/23  0416  SODIUM 128* 131* 127*  POTASSIUM 4.2 4.2 4.2  CHLORIDE 95* 96 94*  CO2 27 28 24  GLUCOSE 111* 71 91  BUN 6* 5* 8  CREATININE 0.48* 0.42* 0.50  CALCIUM 8.0* 8.1* 7.9*     Imaging  IR-US GUIDED PIV  Final Result   Ultrasound-guided PERIPHERAL IV INSERTION performed by   qualified nursing staff as above.     RL-[id number]-WITH & W/O LEFT  Final Result     1.  No evidence of left tibial or fibular osteomyelitis or fracture.     2.  Diffuse cellulitis     3.  Atrophy of the muscles of the lower leg with edema and patchy enhancement consistent with myositis. This involves the tibialis posterior muscle to the greatest degree.     4.  Some fluid seen superficial to the medial head of the gastrocnemius muscle. There is no evidence of rim enhancement to suggest presence of abscess.     MR-FOOT-WITH & W/O LEFT  Final Result     1.  Again seen marrow edema involving the medial aspect of the navicular and the adjacent first cuneiform with some enhancement of that area with overlying soft tissue ulceration and some fluid within the overlying soft tissues consistent with   osteomyelitis versus reactive change. This is unchanged from  comparison.     2.  Marrow edema with small nondisplaced trabecular fracture involving the first metatarsal head with enhancement of that area. Differential diagnosis includes nondisplaced stress/insufficiency fracture versus osteomyelitis.     3.  Again seen marrow edema involving the base of the second metatarsal with some abnormal enhancement with a small possible linear nondisplaced trabecular fracture extending through that area likely representing a small nondisplaced insufficiency   fracture. There is also arthropathy of that joint.     4.  Again seen marrow edema involving the cuneiforms and the adjacent metatarsals likely representing osteoarthritis.     5.  Osteoarthritis of the tibiotalar joint with chronic osteochondral injury of the lateral aspect of the talar dome. There is also osteoarthritis of the subtalar, talonavicular and calcaneocuboid articulations.     6.  Cellulitis involving the hindfoot most prominently medially.     7.  Prominent tenosynovitis of the tibialis posterior tendon and to a lesser degree the flexor digitorum longus and flexor hallucis tendons. This underlies the area of soft tissue ulceration and could potentially represent infectious tenosynovitis. This   is new from comparison.     8.  Pes planus and valgus deformity of the hindfoot.     CT-EXTREMITY, LOWER WITH LEFT  Final Result     1.  No evidence of acute osteomyelitis of the left lower extremity.     2.  No evidence of deep abscess formation in the subcutaneous tissues.     US-EXTREMITY VENOUS LOWER UNILAT LEFT  Final Result     DX-TIBIA AND FIBULA LEFT  Final Result     1.  Diffuse soft tissue swelling, edema and/or cellulitis.  2.  No acute osseous abnormality.  3.  Osteopenia.     DX-FOOT-2- LEFT  Final Result     Severe osteopenia and flat foot deformity without evidence of acute displaced fracture     No definite radiographic evidence of osteomyelitis.     CT-HEAD W/O  Final Result     Head CT without contrast within  normal limits. No evidence of acute cerebral infarction, hemorrhage or mass lesion.     DX-CHEST-PORTABLE (1 VIEW)  Final Result     No acute cardiac or pulmonary abnormalities are identified.     Assessment/Plan  * Sepsis (HCC)- (present on admission)  Assessment & Plan  Sepsis secondary to LLE tenosynovitis, group A strep  bacteremia  Continue cefazolin cefazolin  Duplex negative for DVT x-rays negative for acute bony pathology  Patient evaluated by orthopedics with nonsurgical management recommended and outpatient follow-up with Dr. Pryor  Continue wound care  Evaluated by ID        S/p BKA for source control     Vitamin D deficiency  Assessment & Plan  Will provide 50 K of ergocaliferol     Hypocalcemia  Assessment & Plan  On oral supplement     3/11 calcium 6.9, ionized calcium 1.1     Continue monitor     Immunocompromised state due to drug therapy (HCC)  Assessment & Plan  Has been maintained on Humira and intermittently on prednisone      Bacteremia  Assessment & Plan  Group A strep bacteremia  Repeated culture 2/28 negative  During the stay in the hospital, I will continue to follow the patient, patient has completed IV antibiotics on 3/15/2023     Tenosynovitis of left lower leg  Assessment & Plan  MRI showed tenosynovitis of the tibialis posterior tendon, flexor digitorum longus and hallucis,      S/p left BKA 3/9,  Knee immobilizer to prevent flexion contracture     I discussed with ID, continue antibiotics to complete a 7 days course, okay to transition Ancef to Augmentin 875 mg twice daily     3/12 S/p left BKA 3/9, NWB LLE  Needs dressing change every other day, was changed yesterday, required IV Dilaudid  Sutures removal in 14 to 21 days postoperation     Yeast dermatitis  Assessment & Plan  Started on Diflucan and topical clotrimazole  Skin care     Electrolyte abnormality  Assessment & Plan  Hypokalemia  Hypomagnesemia  Hypophosphatemia  Hypocalcemia     Likely component of refeeding syndrome  patient started on thiamine supplements  Replele electrolytes as needed     Hyponatremia  Assessment & Plan  Likely 2/2 hypovolumic hyponatremia   Improving          --Today sodium is 127, continue IV fluid     Thrombocytopenia (HCC)  Assessment & Plan  Platelet counts 85K likely reactive secondary to sepsis  Now reslved , platelet at 319     Hypoglycemia  Assessment & Plan  Likely secondary to poor intake  Hypoglycemia protocol         --- encourage good po intake     Ulcer of both feet (HCC)- (present on admission)  Assessment & Plan  Wound care  CT negative for abscess  MRI concerning for osteomyelitis patient evaluated by LPS continue wound care  Continue Ancef  Discussed with ID Dr. Galdamez recommends transitioning to p.o. Zyvox on discharge through 3/9/2023 followed by p.o. amoxicillin 1 g twice daily for 3 to 4 weeks and outpatient follow-up with ID and orthopedics  MRI performed of the left knee through foot yesterday with new findings of tenosynovitis which may be the explanation for his persistent elevated temperatures and mild leukocytosis.  ID following and will cont on IV abx for now      3/10 S/p left BKA 3/9 Dr Chambers  PT and OT has evaluated and  recs post-acute   Physiatry has evaluate the patient, stated that patient is a good candidate for acute rehab     Opioid dependence (HCC)- (present on admission)  Assessment & Plan  With narcotic dependence     Patient reported that he uses contin 30mg bid and morphine 30 mg every 6 hours. However, I confirmed with pharmacy, it appears that his home dose is contin 15mg bid and morphine 15 mg every 6 hours         -- Does follow with ain specialist as an op     The dose were increased to contin 30mg bid and morphine 30 mg every 4 hours per his request and given his acute issuses  lyrica 100 tid   Duloxetine     History of immunosuppressive therapy- (present on admission)  Assessment & Plan  Home biweekly Humira and intermittent steroids         -- does  follow Dr Conway as an op     Rheumatoid arthritis (HCC)- (present on admission)  Assessment & Plan  Has been maintained on Humira and intermittently on prednisone although no recent steroid use  Continue to hold Humira  Cortisol level 45    tapered off hydrocortisone     3/12 Reported a severe rheumatoid arthritis plan, May consider to resume RA medications since the infection has been controlled.   3/17: He needs to follow up with rheumatology as an op      History of bleeding ulcers  Assessment & Plan  Continue prilosec  Patient declines pharmacologic DVT prophylaxis discussed RBA     VTE prophylaxis: SCDs/TEDs    DATE OF OPERATION: 3/9/2023     PREOPERATIVE DIAGNOSIS: Nonviable left leg     POSTOPERATIVE DIAGNOSIS: Same     PROCEDURE PERFORMED:  Left below knee amputation     SURGEON: Wayne Chambers M.D.      ASSISTANT: Brooke Adair PA-C     ANESTHESIOLOGIST: Eliezer Otto MD     ANESTHESIA: General     ESTIMATED BLOOD LOSS: 25 mL     INDICATIONS: The patient is a 47 y.o. male with a necrotic left leg.  The leg was deemed to be nonviable by the Limb Preservation Service due to vascular status, poor soft tissue and chronic infection.  Given these findings, below knee amputation was indicated.  I discussed the risks and benefits of the procedure, including the risks of infection, wound healing complication, neurovascular injury, need for more proximal amputation, and the medical risks of anesthesia including DVT, PE, MI, stroke, and death.  Benefits include early mobilization and hopeful avoidence of sepsis and death.  Alternatives to surgery were also discussed, including non-operative management.  The patient signed the informed consent and the operative extremity was marked.     Co-morbidities:  See PMH  Potential Risk - Complications: Contractures, Deep Vein Thrombosis, Incontinence, Malnutrition, Pain, Pneumonia, Pressure Ulcer, and Urinary Tract Infection  Level of Risk: High    Ongoing Medical  "Management Needed (Medical/Nursing Needs):   Patient Active Problem List    Diagnosis Date Noted    Vitamin D deficiency 03/14/2023    Hypocalcemia 03/11/2023    Bacteremia 03/08/2023    Immunocompromised state due to drug therapy ([facility]) 03/08/2023    Tenosynovitis of left lower leg 03/06/2023    Yeast dermatitis 03/04/2023    Electrolyte abnormality 03/01/2023    Sepsis ([facility]) 02/28/2023    Hypoglycemia 02/28/2023    Thrombocytopenia ([facility]) 02/28/2023    Hyponatremia 02/28/2023    Debility 06/15/2022    Impaired mobility and ADLs 06/15/2022    Ulcer of both feet ([facility]) 06/15/2022    Personal history of COVID-19 01/31/2022    Opioid dependence ([facility]) 01/18/2021    Other chronic pain 01/18/2021    Multiple falls 12/10/2020    Localized osteoporosis without current pathological fracture 10/19/2020    History of osteomyelitis 08/14/2020    History of immunosuppressive therapy 08/14/2020    S/P total knee arthroplasty, right 07/27/2020    Paroxysmal atrial fibrillation ([facility]) 07/23/2020    Cigarette nicotine dependence without complication 07/21/2020    Aortic root dilatation ([facility]) 09/13/2019    H/o  essential hypertension 09/13/2019    Aortic stenosis, moderate 09/12/2019    Rheumatoid arthritis ([facility]) 09/08/2015    Chronic, continuous use of opioids 09/08/2015    Chronic pain of right knee 09/08/2015    History of bleeding ulcers 02/12/2015    MONICA (obstructive sleep apnea) 03/09/2010    Mood disorder ([facility]) 05/21/2009    ELISEO (generalized anxiety disorder) 05/21/2009     A & O    Current Vital Signs:   Temperature: 36.8 °C (98.2 °F) Pulse: 61 Respiration: 16 Blood Pressure: 105/70  Weight: 97.3 kg (214 lb 8.1 oz) Height: 157.5 cm (5' 2.01\")  Pulse Oximetry: 98 % O2 (LPM): 0      Completed Laboratory Reports:  Recent Labs     03/18/23  0100 03/18/23  1019 03/19/23  0416 03/20/23  0035   WBC 6.2  --   --   --    HEMOGLOBIN 9.0*  --   --   --    HEMATOCRIT 29.2*  --   --   --    PLATELETCT 319  --   --   --    SODIUM 128* 131* " 127* 134*   POTASSIUM 4.2 4.2 4.2 3.9   BUN 6* 5* 8 9   CREATININE 0.48* 0.42* 0.50 0.35*   ALBUMIN 2.2*  --  2.2*  --    GLUCOSE 111* 71 91 86     Additional Labs: Not Applicable    Prior Living Situation:   Housing / Facility: 1 Story House (2 [address] Steps)  Steps Into Home: 2  Lives with - Patient's Self Care Capacity: Spouse, Child Less than 18 Years of Age  Equipment Owned: 4-Wheel Walker, Tub / Shower Seat    Prior Level of Function / Living Situation:   Physical Therapy: Prior Services: Home-Independent  Housing / Facility: 1 Story House (2 [address] Steps)  Steps Into Home: 2  Bathroom Set up: Walk In Shower  Equipment Owned: 4-Wheel Walker, Tub / Shower Seat  Lives with - Patient's Self Care Capacity: Spouse, Child Less than 18 Years of Age  Bed Mobility: Independent  Transfer Status: Independent  Ambulation: Independent  Assistive Devices Used: 4-Wheel Walker  Stairs: Independent  Current Level of Function:   Gait Level Of Assist: Unable to Participate  Weight Bearing Status: L LE NWB; R LE WBAT  Supine to Sit: Supervised (use of bed features)  Sit to Supine:  (up in w/c post)  Scooting: Contact Guard Assist  Rolling: Supervised  Skilled Intervention: Verbal Cuing, Sequencing, Compensatory Strategies  Comments: Pt demonstrated increased independence in scooting for slideboard transfer, requires increased time and energy 2/2 to B UE weakness and pain  Sit to Stand:  (NT)  Bed, Chair, Wheelchair Transfer: Minimal Assist (for slideboard placement and removal)  Transfer Method: Slide Board  Skilled Intervention: Compensatory Strategies, Facilitation, Sequencing, Verbal Cuing, Tactile Cuing  Sitting in Chair: > 15 min (up in w/c post)  Sitting Edge of Bed: 7 min  Standing: NT  Occupational Therapy:   Self Feeding: Independent  Grooming / Hygiene: Independent  Bathing: Independent  Dressing: Independent  Toileting: Independent  Medication Management: Independent  Laundry: Independent  Kitchen Mobility:  "Independent  Finances: Independent  Home Management: Independent  Prior Level Of Mobility: Independent With Device in Community, Independent With Device in Home  Prior Services: Home-Independent  Housing / Facility: 1 Story House (2 [address] Steps)  Current Level of Function:   Bathing:  (declined)  Upper Body Dressing: Minimal Assist  Lower Body Dressing: Moderate Assist  Toileting:  (no need for BM at this time. Pt uses urinal Indep)  Skilled Intervention: Verbal Cuing, Tactile Cuing, Sequencing, Compensatory Strategies  Comments: Pt will need assist with most self care tasks at this time. Pt stated he \"has no one at home that can help.\"  Speech Language Pathology:      Rehabilitation Prognosis/Potential: Good  Estimated Length of Stay: 10-12 days    Nursing:      Continent    Scope/Intensity of Services Recommended:  Physical Therapy: 1.5 hr / day  5 days / week. Therapeutic Interventions Required: Maximize Endurance, Mobility, Strength, and Safety  Occupational Therapy: 1.5 hr / day 5 days / week. Therapeutic Interventions Required: Maximize Self Care, ADLs, IADLs, and Energy Conservation  Rehabilitation Nursin/7. Therapeutic Interventions Required: Monitor Pain, Skin, Wound(s), Vital Signs, Intake and Output, Labs, Safety, and Family Training  Rehabilitation Physician: 3 - 5 days / week. Therapeutic Interventions Required: Medical Management  Dietician: Nutritional Assessment/Education. Therapeutic Interventions Required: .    He requires 24-hour rehabilitation nursing to manage skin care, surgical incision, nutrition and fluid intake, pain control, safety, medication management, and patient/family goals. In addition, rehabilitation nursing will reiterate and reinforce therapy skills and equipment use, including ADLs, as well as provide education to the patient and family. Richard Hubbard II is willing to participate in and is able to tolerate the proposed plan of care.    Rehabilitation Goals and Plan " (Expected frequency & duration of treatment in the IRF):   Return to the Community, Modified Independent Level of Care, and Family Able to Provide 24/7 Assistance  Anticipated Date of Rehabilitation Admission: 03-20-23  Patient/Family oriented IRF level of care/facility/plan: Yes  Patient/Family willing to participate in IRF care/facility/plan: Yes  Patient able to tolerate IRF level of care proposed: Yes  Patient has potential to benefit IRF level of care proposed: Yes  Comments: Not Applicable    Special Needs or Precautions - Medical Necessity:  Safety Concerns/Precautions:  Fall Risk / High Risk for Falls, Balance, and Bed / Chair Alarm  Pain Management  Precautions: Fall Risk;Non Weight Bearing Left Lower Extremity;Weight Bearing As Tolerated Right Lower Extremity;Immobilizer Left Lower Extremity  Comments: s/p L BKA, immobilizer LLE; foot ulcer R foot; Hx RA  IV Site: Peripheral  Current Medications:    Current Facility-Administered Medications Ordered in Epic   Medication Dose Route Frequency Provider Last Rate Last Admin    mirtazapine (Remeron) tablet 7.5 mg  7.5 mg Oral HS PRN Jeanie Krueger M.D.        acetaminophen (Tylenol) tablet 650 mg  650 mg Oral Q6HRS Angelique Cody D.O.   650 mg at 03/20/23 1200    pregabalin (LYRICA) capsule 150 mg  150 mg Oral TID Angelique Cody D.O.   150 mg at 03/20/23 1200    DULoxetine (CYMBALTA) capsule 60 mg  60 mg Oral DAILY KAILASH TapiaDMary   60 mg at 03/20/23 0454    NS infusion   Intravenous Continuous Gil Dalton M.D. 125 mL/hr at 03/20/23 0759 New Bag at 03/20/23 0759    morphine (MS IR) tablet 30 mg  30 mg Oral Q4HRS PRN Gil Dalton M.D.   30 mg at 03/20/23 0927    simethicone (Mylicon) chewable tablet 125 mg  125 mg Oral TID PRN Gil Dalton M.D.   125 mg at 03/19/23 1845    sodium chloride (OCEAN) 0.65 % nasal spray 2 Spray  2 Spray Nasal Q2HRS PRN KAILASH TapiaDMary   2 [place] at 03/19/23 0911    methocarbamol (ROBAXIN) tablet 750 mg  750 mg  Oral TID PRN Gil Dalton M.D.   750 mg at 03/20/23 0927    vitamin D2 (Ergocalciferol) (Drisdol) capsule 50,000 Units  50,000 Units Oral Q7 DAYS Gil Dalton M.D.   50,000 Units at 03/14/23 0932    carboxymethylcellulose (REFRESH TEARS) 0.5 % ophthalmic drops 1 Drop  1 Drop Both Eyes PRN Linsey M Wegener, A.PPATTY   1 Drop at 03/19/23 0911    morphine ER (MS CONTIN) tablet 30 mg  30 mg Oral Q12HRS Tiffany Moseley M.D.   30 mg at 03/20/23 0454    calcium citrate (CALCITRATE) tablet 950 mg  950 mg Oral DAILY Ambrosio Hill M.D.   950 mg at 03/20/23 0454    thiamine (Vitamin B-1) tablet 100 mg  100 mg Oral DAILY Ambrosio Hill M.D.   100 mg at 03/20/23 0454    zolpidem (AMBIEN) tablet 5 mg  5 mg Oral HS PRN Ambrosio Hill M.D.   5 mg at 03/11/23 0143    nicotine (NICODERM) 7 MG/24HR 7 mg  7 mg Transdermal Daily-0600 Torsten Grove M.D.   7 mg at 03/20/23 0454    omeprazole (PRILOSEC) capsule 40 mg  40 mg Oral DAILY Ambrosio Hill M.D.   40 mg at 03/20/23 0454    senna-docusate (PERICOLACE or SENOKOT S) 8.6-50 MG per tablet 2 Tablet  2 Tablet Oral BID Ambrosio Hill M.D.   2 Tablet at 03/20/23 0454    And    polyethylene glycol/lytes (MIRALAX) PACKET 1 Packet  1 Packet Oral QDAY PRN Ambrosio Hill M.D.        And    magnesium hydroxide (MILK OF MAGNESIA) suspension 30 mL  30 mL Oral QDAY PRN Ambrosio Hill M.D.   30 mL at 03/19/23 1719    And    bisacodyl (DULCOLAX) suppository 10 mg  10 mg Rectal QDAY PRN Ambrosio Hill M.D.   10 mg at 03/19/23 0110    lactated ringers infusion (BOLUS)  500 mL Intravenous Once PRN Ambrosio Hill M.D.        Pharmacy Consult Request ...Pain Management Review 1 Each  1 Each Other PHARMACY TO DOSE Ambrosio Hill M.D.        dextrose 10 % BOLUS 25 g  25 g Intravenous Q15 MIN PRN Ambrosio Hill M.D.         Current Outpatient Medications Ordered in Epic   Medication Sig Dispense Refill    Adalimumab (HUMIRA PEN) 40 MG/0.4ML  Pen-injector Kit INJECT 40MG SUBCUTANEOUSLY  EVERY 2 WEEKS 2 Each 4     Diet:   DIET ORDERS (From admission to next 24h)       Start     Ordered    03/10/23 1625  Diet Order Diet: Regular  ALL MEALS        Question:  Diet:  Answer:  Regular    03/10/23 1624                    Anticipated Discharge Destination / Patient/Family Goal:  Destination: Home with Assistance Support System: Spouse  Anticipated home health services: OT and PT  Previously used HH service/ provider: Not Applicable  Anticipated DME Needs: Walker, Wheelchair, and Life [employer]  Outpatient Services: OT and PT  Alternative resources to address additional identified needs:     No future appointments.     MAGNUS Bar with [facility] will f/u with meds.    Pre-Screen Completed: 3/20/2023 1:30 PM Ayaz Vasquez L.P.N.

## 2023-03-20 NOTE — CONSULTS
"  [facility] PSYCHIATRIC FOLLOW-UP:(established)  *Reason for admission:  chronic bilateral feet ulcer. He has a history of rheumatoid arthritis. LBKA  *Legal Hold Status: not applicable                S: the remeron was helpful to fall back asleep, but on the other hand may be \"too strong\". We agreed to change it to an as needed medication that he must request.  His mood, etc is very much as yesterday. He make every effort to put a good  face on  so to speak.      O: Medical ROS (as pertinent):                      *Psychiatric Examination:   Vitals:   Vitals:    03/19/23 1500 03/19/23 1530 03/19/23 2000 03/20/23 0726   BP: 120/65 133/56 124/76 105/70   Pulse:   60 61   Resp: 17 17 17 16   Temp: 36.1 °C (97 °F) 36.4 °C (97.5 °F) 36.2 °C (97.2 °F) 36.8 °C (98.2 °F)   TempSrc: Temporal Temporal Temporal Temporal   SpO2: 90% 90% 98% 98%   Weight:       Height:         General Appearance:  good eye contact, pleasant, coopeative  Abnormal Movements: none   Gait and Posture: not observed  Speech: within normal limits  Thought Process: normal rate  Associations:   linear  Abnormal or Psychotic Thoughts: none  Judgement and Insight: intact  Orientation: grossly intact  Recent and Remote Memory: grossly intact  Attention Span and Concentration: intact  Language:fluent  Fund of Knowledge: good  Mood and Affect: depressed but looks euthymic  SI/HI:  suicidal - no and homicidal - no        Current Medications:  Scheduled Medications   Medication Dose Frequency    acetaminophen  650 mg Q6HRS    pregabalin  150 mg TID    DULoxetine  60 mg DAILY    mirtazapine  7.5 mg QHS    vitamin D2 (Ergocalciferol)  50,000 Units Q7 DAYS    morphine ER  30 mg Q12HRS    calcium citrate  950 mg DAILY    thiamine  100 mg DAILY    nicotine  7 mg Daily-0600    omeprazole  40 mg DAILY    senna-docusate  2 Tablet BID    Pharmacy Consult Request  1 Each PHARMACY TO DOSE          *ASSESSMENT/RECOMENDATIONS:  1.Adjustment disorder with " depressed mood and anxiety secondary to chronic pain and medical issues  2  Opiate dependence: prescribed. High likely     Medical:   -hx of gastric bypass 2000 resulting in GI ulceration and bleeding   -RA  -vit D def  -hypocalcemia  -immunocompromised state from meds for RA  -bacteremia  -tenosynovitis of LLE  -L BKA  -yeast dermatitis  -R foot ulceration   -chronic pain issues  -prolonged qtc 566: with electrolyte abnormalities  -hyponatremia 128  -anemia     Recommendations:  Legal Status: vol       Discussed/voalted: ABDULAZIZ Dalton MD     Medication and Other Recommendations: final orders as per Tx Tm  Low dose naltrexone has been used for pain management.  Pt has not been on elavil, tegretol, suboxone, marinol/THC or had a pain pump which have been used for nerve pain and other pain management.  He has been on cymbalta. Has a TENS which did not help him.   Other modalities used in pain are acupuncture and hypnosis  5    remeron 7.5 mg hs: change to prn  6   for now he does not want medications for depression but does want therapy: has issues he did not want to broach with me at this time. Consult for same put it.   7   has been on many antidepressants but when the time comes, if he wishes to be on one: he has not been tried on modafinil.  8  no TSH     Will continue to follow with you.           Discharge recommendations: per tx tm     Discharge Instructions: if appropriate  -Reviewed safety plan: 911, ER, PCM, MHC, Suicide crisis line  -Please assist with outpatient Psychiatric/substance use follow up appointments at discharge once medically cleared.

## 2023-03-20 NOTE — DISCHARGE PLANNING
Case Management Discharge Planning    Admission Date: 2/28/2023  GMLOS: 9.6  ALOS: 20    6-Clicks ADL Score: 18  6-Clicks Mobility Score: 12  PT and/or OT Eval ordered: Yes  Post-acute Referrals Ordered: Yes  Post-acute Choice Obtained: Yes  Has referral(s) been sent to post-acute provider:  Yes      Anticipated Discharge Dispo: Discharge Disposition: Disch to IP rehab facility or distinct part unit (62)    DME Needed: No    Action(s) Taken: MDR discussion, chart review, seen by PM&R who recommend IP rehab when stable.     Escalations Completed: None    Medically Clear: Yes    Next Steps: IP rehab acceptance, manage pain.     Barriers to Discharge: Medical clearance    Is the patient up for discharge tomorrow: No

## 2023-03-20 NOTE — THERAPY
Occupational Therapy  Daily Treatment     Patient Name: Richard Hubbard II  Age:  47 y.o., Sex:  male  Medical Record #: [id number]  Today's Date: 3/20/2023    Precautions: Fall Risk, Non Weight Bearing Left Lower Extremity, Weight Bearing As Tolerated Right Lower Extremity, Immobilizer Left Lower Extremity  Comments: s/p L BKA, immobilizer LLE; foot ulcer R foot; Hx RA    Assessment  Pt agreeable to OT tx session. Pt initially approached prior to lunch, but agreeable to stay up in the WC longer. Pt approached after lunch, ready for transfer back to bed. Pt demonstrated good techniques for slideboard transfer with assist with slideboard placement and WC set up (able to verbalize steps/placement with cues). Will continue to follow for skilled OT.    Plan    Treatment Plan Status: (P) Continue Current Treatment Plan  Type of Treatment: Self Care / Activities of Daily Living, Neuro Re-Education / Balance, Therapeutic Exercises, Therapeutic Activity, Adaptive Equipment  Treatment Frequency: 3 Times per Week  Treatment Duration: Until Therapy Goals Met    DC Equipment Recommendations: (P) Unable to determine at this time  Discharge Recommendations: (P) Recommend post-acute placement for additional occupational therapy services prior to discharge home       03/20/23 1408   Pain 0 - 10 Group   Location Leg   Location Orientation Left   Pain Rating Scale (NPRS) 7   Description Aching   Comfort Goal 5   Cognition    Cognition / Consciousness WDL   Comments pleasant and motivated   Other Treatments   Other Treatments Provided education on continued OOB activity with RN   Balance   Sitting Balance (Static) Fair +   Sitting Balance (Dynamic) Fair +   Weight Shift Sitting Fair   Skilled Intervention Verbal Cuing;Tactile Cuing;Compensatory Strategies   Bed Mobility    Sit to Supine Moderate Assist   Scooting Minimal Assist   Skilled Intervention Tactile Cuing;Verbal Cuing   Comments limited by fatigue   How much help from  another person does the patient currently need...   6 Clicks Daily Activity Score 18   Functional Mobility   Bed, Chair, Wheelchair Transfer Minimal Assist   Transfer Method Slide Board   Mobility WC>bed slideboard txf   Skilled Intervention Verbal Cuing;Tactile Cuing;Sequencing;Compensatory Strategies   Comments extensive time 2/2 fatigue/weakness   Patient / Family Goals   Patient / Family Goal #1 To get better   Short Term Goals   Short Term Goal # 1 Pt will demo LB dressing w/ SPV   Goal Outcome # 1 Progressing slower than expected   Short Term Goal # 2 Pt will demo standing grooming w/ SPV   Goal Outcome # 2 Progressing as expected   Short Term Goal # 3 Pt will demo toilet txf w/ SPV   Goal Outcome # 3 Progressing as expected   Education Group   Role of Occupational Therapist Patient Response Patient;Acceptance;Explanation;Demonstration;Verbal Demonstration;Action Demonstration   Energy Conservation Patient Response Patient;Acceptance;Explanation;Demonstration;Verbal Demonstration;Action Demonstration;Reinforcement Needed   Occupational Therapy Treatment Plan    O.T. Treatment Plan Continue Current Treatment Plan   Anticipated Discharge Equipment and Recommendations   DC Equipment Recommendations Unable to determine at this time   Discharge Recommendations Recommend post-acute placement for additional occupational therapy services prior to discharge home

## 2023-03-20 NOTE — CARE PLAN
The patient is Stable - Low risk of patient condition declining or worsening    Shift Goals  Clinical Goals: pain control, skin integrity, mobility.   Patient Goals: rest  Family Goals: not present    Progress made toward(s) clinical / shift goals: Pain controlled with prn medication see MAR, wound care consulted all wound measures are in place. Able to slide board transfer to chair.     Patient is not progressing towards the following goals:

## 2023-03-20 NOTE — DISCHARGE PLANNING
Would appreciate updated [place] evals once appropriate.  Will reach out to the office of Dr. Lee with [facility] regarding f/u scripts once opened.    0825-Spoke with Ana Guevara @ [facility].  [phone].  [facility] is his provider.  Paola left a msg for YANETH Dipika to verify f/u scripts.     1052- Spoke with Sebastián @ [facility].  She will send a msg to [facility].      1312-Spoke with Paola @ [facility].  She stated that [facility] will continue his pain meds, that he'll just need an appt.  Case is under review by Administration.

## 2023-03-20 NOTE — THERAPY
"Physical Therapy   Daily Treatment     Patient Name: Richard Hubbard II  Age:  47 y.o., Sex:  male  Medical Record #: [id number]  Today's Date: 3/20/2023     Precautions  Precautions: Fall Risk;Non Weight Bearing Left Lower Extremity;Weight Bearing As Tolerated Right Lower Extremity;Immobilizer Left Lower Extremity  Comments: s/p L BKA, immobilizer LLE; foot ulcer R foot; Hx RA    Assessment    Pt now POD #11, seen for f/u treatment. Pt demonstrated improved bed mobility to SPV for all bed mobility with bed feature use, Adore for slideboard transfer to w/c with PT-assist for set up. Pt reports continued adherence to therex. ACE wrapping was reinforced this session for L BKA wrap was falling off in bed. Pt continues to present with pain, impaired mobility, balance, decreased activity tolerance, and gross decreased UE and LE strength. Recommend placement upon d/c as pt below functional baseline. Improved tolerance for mobility during PT sessions. No DME needs at this time. Recommend pt be up at EOB for all meals, pt capable of performing this with SPV and good EOB balance.     Plan    Treatment Plan Status: Continue Current Treatment Plan  Type of Treatment: Bed Mobility, Equipment, Neuro Re-Education / Balance, Gait Training, Orthotics Training , Prosthetics Training, Self Care / Home Evaluation, Stair Training, Therapeutic Activities, Therapeutic Exercise  Treatment Frequency: 5 Times per Week  Treatment Duration: Until Therapy Goals Met    DC Equipment Recommendations: Unable to determine at this time  Discharge Recommendations: Recommend post-acute placement for additional physical therapy services prior to discharge home      Subjective    \"I am a little worried about being left in the w/c\"     Objective       03/20/23 1118   Cosignature   Documentation Review Approved with modifications made by preceptor in flowsheet   Precautions   Precautions Fall Risk;Non Weight Bearing Left Lower Extremity;Weight Bearing " As Tolerated Right Lower Extremity;Immobilizer Left Lower Extremity   Comments s/p L BKA, immobilizer LLE; foot ulcer R foot; Hx RA   Vitals   O2 (LPM) 0   O2 Delivery Device None - Room Air   Vitals Comments Pt reported no dizziness throughout session   Pain 0 - 10 Group   Therapist Pain Assessment During Activity;Nurse Notified  (Pt endorsing high pain throughout session with mobility, however, reports his pain is being managed well on PO medication)   Cognition    Cognition / Consciousness WDL   Level of Consciousness Alert   Comments Pt continues to be pleasant and cooperative but expressing emotional lability regarding prolonged hospital stay. Pt fearful of being up in w/c post session   Passive ROM Lower Body   Passive ROM Lower Body X   Comments RLE tibial external rotation and limited ankle DF baseline, otherwise WDL   Active ROM Lower Body    Active ROM Lower Body  X   Comments L knee flexion to 100 deg. this session, limited by pain. L knee extension to 0   Strength Lower Body   Lower Body Strength  X   Comments R ankle DF 3-/5 within available range, knee ext 3+/5, hip flexion 3-/5. L knee ext 3/5, hip flexion 3-/5, hip abd/add 2+/5. Maintained B LE strength from previous session. Pt reports this is due to continued therex   Sensation Lower Body   Lower Extremity Sensation   X   Comments c/o BLE numbness/tingling   Lower Body Muscle Tone   Lower Body Muscle Tone  WDL   Supine Lower Body Exercise   Supine Lower Body Exercises No   Bicep Curl 1 set of 10   Comments Not reviewed this session; pt utilizing supine LE exercise HA   Sitting Lower Body Exercises   Sitting Lower Body Exercises No   Long Arc Quad   (1x5, hold end-range 5 seconds. Therapist assist for full knee extension on last rep due to fatigue of L quad)   Hamstring Curl   (1x5)   Comments Not reviewed this session   Neuro-Muscular Treatments   Neuro-Muscular Treatments Anterior weight shift;Compensatory Strategies;Facilitation;Tactile  Cuing;Verbal Cuing;Weight Shift Right;Weight Shift Left;Sequencing   Comments seated, slideboard transfer   Other Treatments   Other Treatments Provided continued education regarding activity tolerance to sitting, pt agreeable to be in w/c for a short term. Re-wrapping on L BKA ace wrap   Neurological Concerns   Neurological Concerns Yes   Sitting Posture During ADL's Forward Head;Kyphotic;Posterior Pelvic Tilt   Paresthesia Present   Balance   Sitting Balance (Static) Fair +   Sitting Balance (Dynamic) Fair   Standing Balance (Static)   (NT)   Standing Balance (Dynamic)   (NT)   Weight Shift Sitting Fair   Weight Shift Standing Absent  (L BKA)   Skilled Intervention Compensatory Strategies;Facilitation;Verbal Cuing;Tactile Cuing   Comments EOB and slideboard transfer. Pt weight shift continuing to improve, able to offload buttocks in short bursts. Required increased time and energy 2/2 to B UE weakness and RA pain   Bed Mobility    Supine to Sit Supervised  (use of bed features)   Sit to Supine   (up in w/c post)   Scooting Contact Guard Assist   Rolling Supervised   Skilled Intervention Verbal Cuing;Sequencing;Compensatory Strategies   Comments Pt demonstrated increased independence in scooting for slideboard transfer, requires increased time and energy 2/2 to B UE weakness and pain   Gait Analysis   Gait Level Of Assist Unable to Participate   Weight Bearing Status L LE NWB; R LE WBAT   Comments pt has preferred R shoe   Functional Mobility   Sit to Stand   (NT)   Bed, Chair, Wheelchair Transfer Minimal Assist  (for slideboard placement and removal)   Transfer Method Slide Board   Mobility supine > EOB > slideboard to w/c   Skilled Intervention Compensatory Strategies;Facilitation;Sequencing;Verbal Cuing;Tactile Cuing   Comments required increased time and effort 2/2 to B UE weakness and RA pain   How much difficulty does the patient currently have...   Turning over in bed (including adjusting bedclothes, sheets  and blankets)? 3   Sitting down on and standing up from a chair with arms (e.g., wheelchair, bedside commode, etc.) 1   Moving from lying on back to sitting on the side of the bed? 3   How much help from another person does the patient currently need...   Moving to and from a bed to a chair (including a wheelchair)? 3   Need to walk in a hospital room? 1   Climbing 3-5 steps with a railing? 1   6 clicks Mobility Score 12   Activity Tolerance   Sitting in Chair > 15 min  (up in w/c post)   Sitting Edge of Bed 7 min   Standing NT   Comments decreased tolerance to sitting and standing 2/2 to pain   Short Term Goals    Short Term Goal # 1 Pt will perform supine <> sit without bed features with SPV in 6 visits to progress functional independence   Goal Outcome # 1 Progressing as expected   Short Term Goal # 2 Pt will perform STS with FWW and min A in 6 visits to progress OOB mobility   Goal Outcome # 2 Goal met, new goal added   Short Term Goal # 2 B  Pt will perform STS with FWW and SPV in 6 visits to progress OOB mobility   Goal Outcome # 2 B Goal not met   Short Term Goal # 3 Pt will perform slide board transfer with Lisa in 6 visits to improve functional independence   Goal Outcome # 3 Goal met, new goal added   Short Term Goal # 3 B Pt will perform slide board transfer with SPV in 6 visits to improve functional independence   Short Term Goal # 4 Pt will self propel w/c 250ft with SPV in 6 visits to safely access community distances   Goal Outcome # 4 Goal not met   Education Group   Education Provided Role of Physical Therapist;Use of Assistive Device   Role of Physical Therapist Patient Response Patient;Family;Acceptance;Explanation;Action Demonstration   Use of Assistive Device Patient Response Patient;Acceptance;Explanation;Action Demonstration   Physical Therapy Treatment Plan   Physical Therapy Treatment Plan Continue Current Treatment Plan   Anticipated Discharge Equipment and Recommendations   DC Equipment  Recommendations Unable to determine at this time   Discharge Recommendations Recommend post-acute placement for additional physical therapy services prior to discharge home   Interdisciplinary Plan of Care Collaboration   IDT Collaboration with  Nursing;Occupational Therapist   Patient Position at End of Therapy Seated;Call Light within Reach;Tray Table within Reach;Phone within Reach   Collaboration Comments RN updated   Session Information   Date / Session Number  3/20- 7 (2/5, 3/23)

## 2023-03-20 NOTE — CARE PLAN
The patient is Stable - Low risk of patient condition declining or worsening    Shift Goals  Clinical Goals: pain control, skin integrity  Patient Goals: rest  Family Goals: not present    Progress made toward(s) clinical / shift goals:    Problem: Pain - Standard  Goal: Alleviation of pain or a reduction in pain to the patient’s comfort goal  Outcome: Progressing     Problem: Fall Risk  Goal: Patient will remain free from falls  Outcome: Progressing     Problem: Knowledge Deficit - Standard  Goal: Patient and family/care givers will demonstrate understanding of plan of care, disease process/condition, diagnostic tests and medications  Outcome: Progressing       Patient is not progressing towards the following goals:

## 2023-03-20 NOTE — PROGRESS NOTES
Hospital Medicine Daily Progress Note    Date of Service  3/20/2023    Chief Complaint  Richard Hubbard II is a 47 y.o. male admitted 2/28/2023 with cellulitis and sepsis    Hospital Course  This is a 47-year-old male with a past medical history significant for rheumatoid arthritis on Humira, history of planovalgus deformities and foot ulcers with recent MRI concerning for osteomyelitis of the left navicular bone presented ER on 2/28 with altered mental status, fever at 103 and hypoglycemia, worsening erythema of the left lower extremity.  Patient was then being admitted for sepsis secondary to left lower extremity cellulitis.    Considering patient being hypotensive and hypoglycemic patient was admitted to [facility].  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  ID has evaluated the patient during the stay in the hospital and recommended continuing antibiotics till 3/15/2023; patient has completed IV antibiotics course at this time.    PT/OT evaluate the patient medical and postacute, physiatry evaluate the patient emergency patient is a good candidate for acute rehab.      Patient does have acute on chronic pain secondary to recent BKA.  He has been seen in the past by pain specialist.  Currently patient has been taking duloxetine 60 mg p.o. daily, morphine IR 30 mg every 6 hours, MS Contin 30 twice daily, Lyrica 150 3 times daily.  Patient has been medically stable to be discharged to acute rehab.  At this time patient is not septic as she completed antibiotic course.      Interval events:  --  No acute Events overnight, patient has been stable, patient is alert, awake, answering questions appropriately.  Patient continued to complain of pain but he reports that he had his pain is well controlled on oral medication.   -- Currently has been doing 30 mg of MS  Contin twice daily, 30 mg of MS every 6 hours, Lyrica 150 3 times daily, scheduled Tylenol, Robaxin, Lyrica 150 3 times daily  --Patient was seen by the pain specialist in the past  PT/OT has evaluate the patient medical postacute.  At this time patient medically stable to be discharged to acute rehab  __ Physiatry  continue to follow th patient     3/20:  -- No acute events overnight, medicine has been stable, patient is alert, awake, answering questions appropriately.  Patient report that his pain is better controlled on current regimen.  We discussed with educated the patient and recommended postacute.  At this time patient medically stable to be discharged to acute rehab.  -- Continue Robaxin, Lyrica, pain medication including narcotic and nonnarcotic.      I have discussed this patient's plan of care and discharge plan at IDT rounds today with Case Management, Nursing, Nursing leadership, and other members of the IDT team.    Consultants/Specialty  critical care    Code Status  Full Code    Disposition  Patient is medically cleared for discharge., awaiting insurance auth  Anticipate discharge to rehab .  I have placed the appropriate orders for post-discharge needs.    Review of Systems  Review of Systems   Constitutional:  Positive for malaise/fatigue. Negative for fever.   HENT:  Negative for congestion and sore throat.    Eyes:  Negative for blurred vision.   Respiratory:  Negative for shortness of breath.    Cardiovascular:  Negative for chest pain and orthopnea.   Gastrointestinal:  Negative for diarrhea, heartburn and nausea.   Genitourinary:  Negative for dysuria.   Musculoskeletal:  Positive for joint pain. Negative for myalgias.   Skin:  Negative for rash.   Neurological:  Negative for focal weakness and headaches.   Psychiatric/Behavioral:  The patient is nervous/anxious.    All other systems reviewed and are negative.     Physical Exam  Temp:  [36.2 °C (97.2 °F)-36.8 °C (98.2 °F)] 36.8 °C (98.2  °F)  Pulse:  [60-61] 61  Resp:  [16-17] 16  BP: (105-133)/(56-76) 105/70  SpO2:  [90 %-98 %] 98 %    Physical Exam  Vitals and nursing note reviewed.   Constitutional:       Appearance: Normal appearance. He is well-developed. He is not diaphoretic.   HENT:      Head: Normocephalic and atraumatic.   Eyes:      Extraocular Movements: Extraocular movements intact.      Pupils: Pupils are equal, round, and reactive to light.   Neck:      Vascular: No JVD.      Trachea: No tracheal deviation.   Cardiovascular:      Rate and Rhythm: Normal rate and regular rhythm.   Pulmonary:      Effort: Pulmonary effort is normal. No respiratory distress.      Breath sounds: No stridor.   Abdominal:      General: There is no distension.      Palpations: Abdomen is soft.      Hernia: A hernia (Soft and reducible) is present.   Musculoskeletal:         General: Swelling and tenderness present.      Cervical back: Neck supple.   Skin:     General: Skin is warm.      Findings: Erythema present.      Nails: There is no clubbing.   Neurological:      Mental Status: He is alert and oriented to person, place, and time.      Cranial Nerves: No cranial nerve deficit.      Motor: No abnormal muscle tone.   Psychiatric:         Behavior: Behavior normal.       Fluids    Intake/Output Summary (Last 24 hours) at 3/20/2023 1524  Last data filed at 3/20/2023 1030  Gross per 24 hour   Intake 1040 ml   Output 2200 ml   Net -1160 ml         Laboratory  Recent Labs     03/18/23  0100   WBC 6.2   RBC 3.17*   HEMOGLOBIN 9.0*   HEMATOCRIT 29.2*   MCV 92.1   MCH 28.4   MCHC 30.8*   RDW 47.9   PLATELETCT 319   MPV 8.6*       Recent Labs     03/18/23  1019 03/19/23  0416 03/20/23  0035   SODIUM 131* 127* 134*   POTASSIUM 4.2 4.2 3.9   CHLORIDE 96 94* 101   CO2 28 24 27   GLUCOSE 71 91 86   BUN 5* 8 9   CREATININE 0.42* 0.50 0.35*   CALCIUM 8.1* 7.9* 8.2*                       Imaging  IR-US GUIDED PIV   Final Result    Ultrasound-guided PERIPHERAL IV INSERTION  performed by    qualified nursing staff as above.      [id number]-WITH & W/O LEFT   Final Result      1.  No evidence of left tibial or fibular osteomyelitis or fracture.      2.  Diffuse cellulitis      3.  Atrophy of the muscles of the lower leg with edema and patchy enhancement consistent with myositis. This involves the tibialis posterior muscle to the greatest degree.      4.  Some fluid seen superficial to the medial head of the gastrocnemius muscle. There is no evidence of rim enhancement to suggest presence of abscess.      MR-FOOT-WITH & W/O LEFT   Final Result      1.  Again seen marrow edema involving the medial aspect of the navicular and the adjacent first cuneiform with some enhancement of that area with overlying soft tissue ulceration and some fluid within the overlying soft tissues consistent with    osteomyelitis versus reactive change. This is unchanged from comparison.      2.  Marrow edema with small nondisplaced trabecular fracture involving the first metatarsal head with enhancement of that area. Differential diagnosis includes nondisplaced stress/insufficiency fracture versus osteomyelitis.      3.  Again seen marrow edema involving the base of the second metatarsal with some abnormal enhancement with a small possible linear nondisplaced trabecular fracture extending through that area likely representing a small nondisplaced insufficiency    fracture. There is also arthropathy of that joint.      4.  Again seen marrow edema involving the cuneiforms and the adjacent metatarsals likely representing osteoarthritis.      5.  Osteoarthritis of the tibiotalar joint with chronic osteochondral injury of the lateral aspect of the talar dome. There is also osteoarthritis of the subtalar, talonavicular and calcaneocuboid articulations.      6.  Cellulitis involving the hindfoot most prominently medially.      7.  Prominent tenosynovitis of the tibialis posterior tendon and to a lesser degree the  flexor digitorum longus and flexor hallucis tendons. This underlies the area of soft tissue ulceration and could potentially represent infectious tenosynovitis. This    is new from comparison.      8.  Pes planus and valgus deformity of the hindfoot.      CT-EXTREMITY, LOWER WITH LEFT   Final Result      1.  No evidence of acute osteomyelitis of the left lower extremity.      2.  No evidence of deep abscess formation in the subcutaneous tissues.      US-EXTREMITY VENOUS LOWER UNILAT LEFT   Final Result      DX-TIBIA AND FIBULA LEFT   Final Result      1.  Diffuse soft tissue swelling, edema and/or cellulitis.   2.  No acute osseous abnormality.   3.  Osteopenia.      DX-FOOT-2- LEFT   Final Result      Severe osteopenia and flat foot deformity without evidence of acute displaced fracture      No definite radiographic evidence of osteomyelitis.      CT-HEAD W/O   Final Result      Head CT without contrast within normal limits. No evidence of acute cerebral infarction, hemorrhage or mass lesion.         DX-CHEST-PORTABLE (1 VIEW)   Final Result      No acute cardiac or pulmonary abnormalities are identified.             Assessment/Plan  * Sepsis (HCC)- (present on admission)  Assessment & Plan  Sepsis secondary to LLE tenosynovitis, group A strep  bacteremia  Continue cefazolin cefazolin  Duplex negative for DVT x-rays negative for acute bony pathology  Patient evaluated by orthopedics with nonsurgical management recommended and outpatient follow-up with Dr. Pryor  Continue wound care  Evaluated by ID      S/p BKA for source control    Vitamin D deficiency  Assessment & Plan  Will provide 50 K of ergocaliferol    Hypocalcemia  Assessment & Plan  S/p repletion    Immunocompromised state due to drug therapy (HCC)  Assessment & Plan  Has been maintained on Humira and intermittently on prednisone     Bacteremia  Assessment & Plan  Group A strep bacteremia  Repeated culture 2/28 negative  During the stay in the  hospital, I will continue to follow the patient, patient has completed IV antibiotics on 3/15/2023    Tenosynovitis of left lower leg  Assessment & Plan  MRI showed tenosynovitis of the tibialis posterior tendon, flexor digitorum longus and hallucis,     S/p left BKA 3/9,  Knee immobilizer to prevent flexion contracture    I discussed with ID, continue antibiotics to complete a 7 days course, okay to transition Ancef to Augmentin 875 mg twice daily    3/12 S/p left BKA 3/9, NWB LLE  Needs dressing change every other day, was changed yesterday, required IV Dilaudid  Sutures removal in 14 to 21 days postoperation    Yeast dermatitis  Assessment & Plan  Started on Diflucan and topical clotrimazole  Skin care    Electrolyte abnormality  Assessment & Plan  Hypokalemia  Hypomagnesemia  Hypophosphatemia  Hypocalcemia    Likely component of refeeding syndrome patient started on thiamine supplements  Replele electrolytes as needed    Hyponatremia  Assessment & Plan  Likely 2/2 hypovolumic hyponatremia     Improving at 134    Thrombocytopenia (HCC)  Assessment & Plan  Platelet counts 85K likely reactive secondary to sepsis  Now reslved , platelet at 319    Hypoglycemia  Assessment & Plan  Likely secondary to poor intake  Hypoglycemia protocol   --- encourage good po intake    Ulcer of both feet (HCC)- (present on admission)  Assessment & Plan  Wound care  CT negative for abscess  MRI concerning for osteomyelitis patient evaluated by LPS continue wound care  Continue Ancef  Discussed with ID Dr. Galdamez recommends transitioning to p.o. Zyvox on discharge through 3/9/2023 followed by p.o. amoxicillin 1 g twice daily for 3 to 4 weeks and outpatient follow-up with ID and orthopedics  MRI performed of the left knee through foot yesterday with new findings of tenosynovitis which may be the explanation for his persistent elevated temperatures and mild leukocytosis.  ID following and will cont on IV abx for now     3/10 S/p left  BKA 3/9 Dr Chambers  PT and OT has evaluated and  recs post-acute   Physiatry has evaluate the patient, stated that patient is a good candidate for acute rehab    Opioid dependence (HCC)- (present on admission)  Assessment & Plan  With narcotic dependence    Patient reported that he uses contin 30mg bid and morphine 30 mg every 6 hours. However, I confirmed with pharmacy, it appears that his home dose is contin 15mg bid and morphine 15 mg every 6 hours   -- Does follow with ain specialist as an op  Patient is on MS Contin and MS IR.  Currently on Lyrica 150 3 times daily along with Robaxin and duloxetine.    Psych saw the patient,psych has  recommended Remeron which has been ordered      History of immunosuppressive therapy- (present on admission)  Assessment & Plan  Home biweekly Humira and intermittent steroids   -- does follow Dr Conway as an op      Chronic, continuous use of opioids  Assessment & Plan  Does follow pain specialist as an outpatient    Rheumatoid arthritis (HCC)- (present on admission)  Assessment & Plan  Has been maintained on Humira and intermittently on prednisone although no recent steroid use  Continue to hold Humira  Cortisol level 45    tapered off hydrocortisone    3/12 Reported a severe rheumatoid arthritis plan, May consider to resume RA medications since the infection has been controlled.   3/17: He needs to follow up with rheumatology as an op     History of bleeding ulcers  Assessment & Plan  Continue prilosec  Patient declines pharmacologic DVT prophylaxis discussed RBA         VTE prophylaxis: SCDs/TEDs    He has been declining chemical ppx at this time;  he understand the risk of having clots    I have performed a physical exam and reviewed and updated ROS and Plan today (3/20/2023). In review of yesterday's note (3/19/2023), there are no changes except as documented above.

## 2023-03-21 PROBLEM — Z89.512 S/P BKA (BELOW KNEE AMPUTATION) UNILATERAL, LEFT (HCC): Status: ACTIVE | Noted: 2023-01-01

## 2023-03-21 NOTE — CARE PLAN
The patient is Stable - Low risk of patient condition declining or worsening    Shift Goals  Clinical Goals: Pain control, dressing changes  Patient Goals: Rest, prn pain medication  Family Goals: not present    Progress made toward(s) clinical / shift goals:      Problem: Skin Integrity  Goal: Skin integrity is maintained or improved  Outcome: Progressing   Pts skin assessed, wounds dressed per orders, barrier cream and antifungal applied to bottom. Waffle mattress in place.     Problem: Fall Risk  Goal: Patient will remain free from falls  Outcome: Progressing   Pt refused bed alarm. Educated on fall risk. All other precautions for a mod fall risk in place. Pt calls appropriately     Problem: Knowledge Deficit - Standard  Goal: Patient and family/care givers will demonstrate understanding of plan of care, disease process/condition, diagnostic tests and medications  Outcome: Progressing   Pt asks appropriate questions about his care and displays an understanding of his treatments and barriers to discharge.    Problem: Self Care  Goal: Patient will have the ability to perform ADLs independently or with assistance (bathe, groom, dress, toilet and feed)  Outcome: Progressing   Pt performs ADLs such as voiding in a urinal independently;y, and turning self. Pt also states that he can perform wound care as well.     Patient is not progressing towards the following goals:

## 2023-03-21 NOTE — WOUND TEAM
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  Wound dressing placed for nursing to change.  Wound team to follow weekly.  Wound consult completed at this time.

## 2023-03-21 NOTE — CARE PLAN
The patient is Watcher - Medium risk of patient condition declining or worsening    Shift Goals  Clinical Goals: pain control, skin integrity, mobility  Patient Goals: linen change, transfer to wheelchair  Family Goals: not present    Progress made toward(s) clinical / shift goals:  yes    Problem: Pain - Standard  Goal: Alleviation of pain or a reduction in pain to the patient’s comfort goal  Outcome: Progressing     Problem: Urinary - Renal Perfusion  Goal: Ability to achieve and maintain adequate renal perfusion and functioning will improve  Outcome: Progressing     Problem: Respiratory  Goal: Patient will achieve/maintain optimum respiratory ventilation and gas exchange  Outcome: Progressing     Problem: Physical Regulation  Goal: Diagnostic test results will improve  Outcome: Progressing  Goal: Signs and symptoms of infection will decrease  Outcome: Progressing     Problem: Skin Integrity  Goal: Skin integrity is maintained or improved  Outcome: Progressing     Problem: Fall Risk  Goal: Patient will remain free from falls  Outcome: Progressing     Problem: Knowledge Deficit - Standard  Goal: Patient and family/care givers will demonstrate understanding of plan of care, disease process/condition, diagnostic tests and medications  Outcome: Progressing     Problem: Self Care  Goal: Patient will have the ability to perform ADLs independently or with assistance (bathe, groom, dress, toilet and feed)  Outcome: Progressing     Problem: Mobility  Goal: Patient's capacity to carry out activities will improve  Outcome: Progressing  Flowsheets (Taken 3/21/2023 1034)  Mobility:   Encouraged mobilization per interdisciplinary team recommendations   Monitored for signs of activity intolerance   Provided assistive devices   Provided rest periods between activities   Administered pain management to allow progressive mobilization   Collaborated with PT/OT

## 2023-03-21 NOTE — WOUND TEAM
[facility]  Inpatient Services  Wound and Skin Care Brief Evaluation    Admission Date: 2/28/2023     Last order of IP CONSULT TO WOUND CARE was found on 3/20/2023 from Hospital Encounter on 2/28/2023     HPI, PMH, SH: Reviewed    Chief Complaint   Patient presents with    Flu Like Symptoms     X 3 days, patient reports malaise, fever, and diarrhea     Low Blood Sugar     40 per EMS. Patient given 250 cc D10. FSBS up to 111    Leg Swelling     Redness and warmth to the LLE. Chronic wounds to bilateral feet      Diagnosis: Sepsis (HCC) [A41.9]    Unit where seen by Wound Team: T315/02     Wound consult placed regarding sacrum, gluteal cleft. Chart and images reviewed. This discussed with bedside RN, Boogie. This RN in to assess patient. Pt pleasant and agreeable. Patient able to turn without assistance. Non-selectively debrided with NS/wound cleanser and gauze OR moist warm washcloth and no rinse foam soap. No pressure injuries or advanced wound care needs identified. Sacrum and lower back has redness with small open wounds, that is all blanching.  Anti-fungal applied for petechial area that is raised and yeast.  Wound consult completed. No further follow up unless indicated and consulted.               RSKIN:   CURRENTLY IN PLACE (X), APPLIED THIS VISIT (A), ORDERED (O):   Q shift Nam:  X  Q shift pressure point assessments:  X    Surface/Positioning   Standard/Trauma Bed          Low Airloss    X      Bariatric STEPHANIE     ICU STEPHANIE                              Waffle cushion        Waffle Overlay          Reposition q 2 hours      TAPs Turning system     Z Odell Pillow     Offloading/Redistribution   Sacral Offloading Dressing (Silicone dressing)     Heel Offloading Dressing (Silicone dressing)       x  Heel float boots (Prevalon boot)             Float Heels off Bed with Pillows  x         Respiratory NA,  room air  Silicone O2 tubing      Gray Foam Ear protectors     Cannula fixation Device (Tender  )          High flow offloading Clip    Elastic head band offloading device      Anchorfast                                                         Trach with Optifoam split foam             Containment/Moisture Prevention     Rectal tube or BMS    Purwick/Condom Cath        Rivera Catheter    Barrier wipes           Barrier paste     X  Antifungal tx   X   Interdry        Mobilization       Up to chair        Ambulate      PT/OT  x    Nutrition       Dietician        Diabetes Education      PO   x  TF     TPN     NPO   # days     Other

## 2023-03-21 NOTE — PROGRESS NOTES
Patient admitted to facility at 1140 via wheelchair; accompanied by hospital transport.  Patient assisted to room and positioned in bed for comfort and safety; call light within reach.  Patient assisted with stowing belongings and oriented to room and facility.  Admission assessment performed and documented in computer. Patient received and reviewed education binder. Admission paperwork completed; signed copies placed in chart.  Will continue to monitor.

## 2023-03-21 NOTE — DISCHARGE INSTRUCTIONS
Osteomyelitis, Adult    Bone infections (osteomyelitis) occur when bacteria or other germs get inside a bone. This can happen if you have an infection in another part of your body that spreads through your blood. Germs from your skin or from outside of your body can also cause this type of infection if you have a wound or a broken bone (fracture) that breaks the skin.  Bone infections need to be treated quickly to prevent bone damage and to prevent the infection from spreading to other areas of your body.  What are the causes?  Most bone infections are caused by bacteria. They can also be caused by other germs, such as viruses and funguses.  What increases the risk?  You are more likely to develop this condition if you:  Recently had surgery, especially bone or joint surgery.  Have a long-term (chronic) disease, such as:  Diabetes.  HIV (human immunodeficiency virus).  Rheumatoid arthritis.  Sickle cell anemia.  Kidney disease that requires dialysis.  Are aged 60 years or older.  Have a condition or take medicines that block or weaken your body's defense system (immune system).  Have a condition that reduces your blood flow.  Have an artificial joint.  Have had a joint or bone repaired with plates or screws (surgical hardware).  Use IV drugs.  Have a central line for IV access.  Have had trauma, such as stepping on a nail or a broken bone that came through the skin.  What are the signs or symptoms?  Symptoms vary depending on the type and location of your infection. Common symptoms of bone infections include:  Fever and chills.  Skin redness and warmth.  Swelling.  Pain and stiffness.  Drainage of fluid or pus near the infection.  How is this diagnosed?  This condition may be diagnosed based on:  Your symptoms and medical history.  A physical exam.  Tests, such as:  A sample of tissue, fluid, or blood taken to be examined under a microscope.  Pus or discharge swabbed from a wound for testing to identify germs and to  determine what type of medicine will kill them (culture and sensitivity).  Blood tests.  Imaging studies. These may include:  X-rays.  MRI.  CT scan.  Bone scan.  Ultrasound.  How is this treated?  Treatment for this condition depends on the cause and type of infection. Antibiotic medicines are usually the first treatment for a bone infection. This may be done in a hospital at first. You may have to continue IV antibiotics at home or take antibiotics by mouth for several weeks after that.  Other treatments may include surgery to remove:  Dead or dying tissue from a bone.  An infected artificial joint.  Infected plates or screws that were used to repair a broken bone.  Follow these instructions at home:  Medicines    Take over-the-counter and prescription medicines only as told by your health care provider.  Take your antibiotic medicine as told by your health care provider. Do not stop taking the antibiotic even if you start to feel better.  Follow instructions from your health care provider about how to take IV antibiotics at home. You may need to have a nurse come to your home to give you the IV antibiotics.  General instructions    Ask your health care provider if you have any restrictions on your activities.  If directed, put ice on the affected area:  Put ice in a plastic bag.  Place a towel between your skin and the bag.  Leave the ice on for 20 minutes, 2-3 times a day.  Wash your hands often with soap and water. If soap and water are not available, use hand .  Do not use any products that contain nicotine or tobacco, such as cigarettes and e-cigarettes. These can delay bone healing. If you need help quitting, ask your health care provider.  Keep all follow-up visits as told by your health care provider. This is important.  Contact a health care provider if:  You develop a fever or chills.  You have redness, warmth, pain, or swelling that returns after treatment.  Get help right away if:  You have  rapid breathing or you have trouble breathing.  You have chest pain.  You cannot drink fluids or make urine.  The affected area swells, changes color, or turns blue.  You have numbness or severe pain in the affected area.  Summary  Bone infections (osteomyelitis) occur when bacteria or other germs get inside a bone.  You may be more likely to get this type of infection if you have a condition, such as diabetes, that lowers your ability to fight infection or increases your chances of getting an infection.  Most bone infections are caused by bacteria. They can also be caused by other germs, such as viruses and funguses.  Treatment for this condition usually starts with taking antibiotics. Further treatment depends on the cause and type of infection.  This information is not intended to replace advice given to you by your health care provider. Make sure you discuss any questions you have with your health care provider.  Document Released: 12/18/2006 Document Revised: 01/03/2019 Document Reviewed: 12/27/2018  [employer] Patient Education © 2020 [employer]  Leg Amputation, Care After  This sheet gives you information about how to care for yourself after your procedure. Your health care provider may also give you more specific instructions. If you have problems or questions, contact your health care provider.  What can I expect after the procedure?  After the procedure, it is common to have:  A little blood or fluid coming from your incision.  Pain from your incision.  Pain that feels like it is coming from the leg that has been removed (phantom pain). This can last for a year or longer.  Skin breakdown on your stump (residual limb).  Feelings of depression, anxiety, and fear.  Follow these instructions at home:  Medicines  Take over-the-counter and prescription medicines only as told by your health care provider.  If you were prescribed an antibiotic medicine, take it as told by your health care provider. Do not stop  taking the antibiotic even if you start to feel better.  Bathing  Do not take baths, swim, use a hot tub, or get your residual limb wet until your health care provider approves. You may only be allowed to take sponge baths.  Ask your health care provider when you may start taking showers. After taking a shower, make sure to rinse and dry your residual limb carefully.  Incision care    Check your residual limb, especially your incision area, every day. Check for:  More redness, swelling, or pain.  More fluid or blood.  Warmth.  Pus or a bad smell.  Blisters.  Scrapes.  Follow instructions from your health care provider about how to take care of your incision. Make sure you:  Wash your hands with soap and water before you change your bandage (dressing). If soap and water are not available, use hand .  Change your dressing as told by your health care provider.  Leave stitches (sutures), skin glue, or adhesive strips in place. These skin closures may need to stay in place for 2 weeks or longer. If adhesive strip edges start to loosen and curl up, you may trim the loose edges. Do not remove adhesive strips completely unless your health care provider tells you to do that.  Activity  Return to your normal activities as told by your health care provider. Ask your health care provider what activities are safe for you.  Do physical therapy exercises as told by your health care provider.  If you have been fitted with an artificial leg (prosthesis) or have been given crutches, use them as told by your health care provider.  Eating and drinking  Eat a healthy diet that includes whole grains, fruits and vegetables, low-fat dairy products, and lean proteins.  Drink enough fluid to keep your urine pale yellow.  Driving  Work with an occupational therapist to learn new strategies for safe driving with an amputation.  Do not drive or use heavy equipment while taking prescription pain medicine.  General instructions  To  prevent or treat constipation while you are taking prescription pain medicine, your health care provider may recommend that you:  Drink enough fluid to keep your urine pale yellow.  Take over-the-counter or prescription medicines.  Eat foods that are high in fiber, such as fresh fruits and vegetables, whole grains, and beans.  Limit foods that are high in fat and processed sugars, such as fried and sweet foods.  Do not use oils, lotion, cream, or rubbing alcohol on the remaining part of your leg.  Wear compression stockings as told by your health care provider.  If you have trouble coping with your amputation, contact your health care provider. Some feelings of depression, anxiety, or fear are normal after an amputation, but if you struggle with these feelings or if they get overwhelming, your provider may be able to recommend a therapist or support group to help you.  Do not use any products that contain nicotine or tobacco, such as cigarettes and e-cigarettes. These can delay bone healing. If you need help quitting, ask your health care provider.  Keep all follow-up visits as told by your health care provider. This is important.  Contact a health care provider if:  You have a fever.  You have more tenderness in your residual limb.  You have a rash or itchy skin.  You have a cough or chills and you feel achy and weak.  You have trouble coping with your amputation.  You have blisters or scrapes on your residual limb.  Get help right away if:  You have severe pain in your residual limb.  You have more redness, swelling, or pain around your incision.  You have more fluid or blood coming from your incision.  Your incision feels warm to the touch, tender, and painful.  You have pus or a bad smell coming from your incision.  You feel light-headed and have shortness of breath.  You have blood-soaked bandages.  You cough up blood.  You have chest pain or pain when taking a deep breath or coughing. If you have these  symptoms, do not drive yourself to the hospital. Call emergency services right away.  If you ever feel like you may hurt yourself or others, or have thoughts about taking your own life, get help right away. You can go to your nearest emergency department or call:  Your local emergency services (911 in the [place]  A suicide crisis helpline, such as the National Suicide Prevention Lifeline at 1-607.347.3276. This is open 24 hours a day.  Summary  After a leg amputation, you may have pain that feels like it is coming from the leg that was removed (phantom pain). This can last for a year or longer.  Follow instructions from your health care provider about how to take care of your incision.  Check your residual limb, especially your incision area, every day. More redness, swelling, or pain may be a sign of infection.  Contact your health care provider if you have trouble coping with your amputation.  This information is not intended to replace advice given to you by your health care provider. Make sure you discuss any questions you have with your health care provider.  Document Released: 03/28/2018 Document Revised: 03/28/2018 Document Reviewed: 03/28/2018  [employer] Patient Education © 2020 [employer]

## 2023-03-21 NOTE — DISCHARGE PLANNING
Msg placed to Dr. Ortega-verifying medical clearance.  Will discuss this case with Administration this am.     0713-Dr. Livingston has medically cleared.     0929-Case is under review by Dr. Pisano.     1007-Dr. Pisano has accepted.  Transport has been arranged via T between [id number]. Dr. Ortega & Katiana, spouse are aware.  Msg placed to Boogie HEATONN & Rufina STILES.

## 2023-03-21 NOTE — PROGRESS NOTES
4 Eyes Skin Assessment Completed by FAITH Lantigua and FAITH Mathis.    Head WDL  Ears WDL  Nose WDL  Mouth WDL  Neck WDL  Breast/Chest WDL  Shoulder Blades WDL  Spine WDL  (R) Arm/Elbow/Hand WDL  (L) Arm/Elbow/Hand WDL  Abdomen WDL  Groin Redness and Blanching  Scrotum/Coccyx/Buttocks Redness and Blanching  (R) Leg WDL  (L) Leg Incision-with sutures-stump cleaned, Vaseline guaze applied, kerlix guaze wrap and ace wrap applied.  (R) Heel/Foot/Toe-right plantar midfoot -partial thickness wound -see picture-hydrofera blue applied  along with dry guaze dressing and hypafix tape applied  (L) Heel/Foot/Toe -BKA          Devices In Places-none      Interventions In Place Pillows and Heels Loaded W/Pillows    Possible Skin Injury No    Pictures Uploaded Into Epic Yes  Wound Consult Placed Yes  RN Wound Prevention Protocol Ordered Yes

## 2023-03-21 NOTE — DISCHARGE SUMMARY
Discharge Summary    CHIEF COMPLAINT ON ADMISSION  Chief Complaint   Patient presents with    Flu Like Symptoms     X 3 days, patient reports malaise, fever, and diarrhea     Low Blood Sugar     40 per EMS. Patient given 250 cc D10. FSBS up to 111    Leg Swelling     Redness and warmth to the LLE. Chronic wounds to bilateral feet        Reason for Admission  EMS    Admission Date  2/28/2023     CODE STATUS  Full Code    HPI & HOSPITAL COURSE  This is a 47 y.o. male here with Flu Like Symptoms (X 3 days, patient reports malaise, fever, and diarrhea ), Low Blood Sugar (40 per EMS. Patient given 250 cc D10. FSBS up to 111), and Leg Swelling (Redness and warmth to the LLE. Chronic wounds to bilateral feet )  Please review Dr. Ambrosio Hill M.D. notes for further details of history of present illness, past medical/social/family histories, allergies and medications. Please review Dr. Chambers, orthopedics consultation notes.  He is obese and smokes cigarettes heavily. He has a history of rheumatoid arthritis with deformities on Humira, MONICA, paroxysmal Afib not on anticoagulation (h.o bleeding ulcers), GI bleeding/bleeding ulcers, narcotic use/opioid dependence. He presents with fever, confusion and worsening redness of lower extyremities simon left side. He was found to be hypoglycemic. He received glucose.  At the ED, he is febrile, low normal blood pressures.   No fractures or bony involvement/infx on XR. MI eventually showed tenosynovitis.   No leukocytosis. Mild hyponatremia. , hypoglycemia resolved.  He was started on antibiotics. L leg appeared nonviable. He underwent L BKA by Reyes Prado on 3/9.   He was seen by ID. HE completed a course of antibiotics. He was also seen by psychiatry. At the time, he did not want antidepressants but was okay with continued behavioral therapy.     See below at follow-up section for other instructions.    At discharge date,  Brad Hubbard II afebrile and  hemodynamically stable.  Richard Hubbard II wanted to be discharged today.    Discharge Physical Exam  Vitals and nursing note reviewed.   Constitutional:       Appearance: Normal appearance. He is well-developed. He is not diaphoretic.   HENT:      Head: Normocephalic and atraumatic.   Eyes:      Extraocular Movements: Extraocular movements intact.      Pupils: Pupils are equal, round, and reactive to light.   Neck:      Vascular: No JVD.      Trachea: No tracheal deviation.   Cardiovascular:      Rate and Rhythm: Normal rate and regular rhythm.   Pulmonary:      Effort: Pulmonary effort is normal. No respiratory distress.      Breath sounds: No stridor.   Abdominal:      General: There is no distension.      Palpations: Abdomen is soft.      Hernia: A hernia (Soft and reducible) is present.   Musculoskeletal:         General: Swelling and tenderness improved.      Cervical back: Neck supple.   Skin:     General: Skin is warm.      Findings: Erythema improved.      Nails: There is no clubbing.   Neurological:      Mental Status: He is alert and oriented to person, place, and time.      Cranial Nerves: No cranial nerve deficit.      Motor: No abnormal muscle tone.   Psychiatric:         Behavior: Behavior normal.     Imaging  IR-US GUIDED PIV   Final Result    Ultrasound-guided PERIPHERAL IV INSERTION performed by    qualified nursing staff as above.      [id number] & W/O LEFT   Final Result      1.  No evidence of left tibial or fibular osteomyelitis or fracture.      2.  Diffuse cellulitis      3.  Atrophy of the muscles of the lower leg with edema and patchy enhancement consistent with myositis. This involves the tibialis posterior muscle to the greatest degree.      4.  Some fluid seen superficial to the medial head of the gastrocnemius muscle. There is no evidence of rim enhancement to suggest presence of abscess.      MR-FOOT-WITH & W/O LEFT   Final Result      1.  Again seen marrow edema  involving the medial aspect of the navicular and the adjacent first cuneiform with some enhancement of that area with overlying soft tissue ulceration and some fluid within the overlying soft tissues consistent with    osteomyelitis versus reactive change. This is unchanged from comparison.      2.  Marrow edema with small nondisplaced trabecular fracture involving the first metatarsal head with enhancement of that area. Differential diagnosis includes nondisplaced stress/insufficiency fracture versus osteomyelitis.      3.  Again seen marrow edema involving the base of the second metatarsal with some abnormal enhancement with a small possible linear nondisplaced trabecular fracture extending through that area likely representing a small nondisplaced insufficiency    fracture. There is also arthropathy of that joint.      4.  Again seen marrow edema involving the cuneiforms and the adjacent metatarsals likely representing osteoarthritis.      5.  Osteoarthritis of the tibiotalar joint with chronic osteochondral injury of the lateral aspect of the talar dome. There is also osteoarthritis of the subtalar, talonavicular and calcaneocuboid articulations.      6.  Cellulitis involving the hindfoot most prominently medially.      7.  Prominent tenosynovitis of the tibialis posterior tendon and to a lesser degree the flexor digitorum longus and flexor hallucis tendons. This underlies the area of soft tissue ulceration and could potentially represent infectious tenosynovitis. This    is new from comparison.      8.  Pes planus and valgus deformity of the hindfoot.      CT-EXTREMITY, LOWER WITH LEFT   Final Result      1.  No evidence of acute osteomyelitis of the left lower extremity.      2.  No evidence of deep abscess formation in the subcutaneous tissues.      US-EXTREMITY VENOUS LOWER UNILAT LEFT   Final Result      DX-TIBIA AND FIBULA LEFT   Final Result      1.  Diffuse soft tissue swelling, edema and/or cellulitis.    2.  No acute osseous abnormality.   3.  Osteopenia.      DX-FOOT-2- LEFT   Final Result      Severe osteopenia and flat foot deformity without evidence of acute displaced fracture      No definite radiographic evidence of osteomyelitis.      CT-HEAD W/O   Final Result      Head CT without contrast within normal limits. No evidence of acute cerebral infarction, hemorrhage or mass lesion.         DX-CHEST-PORTABLE (1 VIEW)   Final Result      No acute cardiac or pulmonary abnormalities are identified.                  Therefore, he is discharged in fair and stable condition to an inpatient rehabilitation hospital.    The patient met 2-midnight criteria for an inpatient stay at the time of discharge.      FOLLOW UP ITEMS POST DISCHARGE      DISCHARGE DIAGNOSES  Principal Problem:    Sepsis, LLE tenosynovitis, GAS bacteremia (HCC) POA: Yes  Active Problems:    MONICA (obstructive sleep apnea) (Chronic) POA: Yes      Overview: AHI 22, minimum saturation 77%, on CPAP 10 cm    History of bleeding ulcers POA: Unknown    Rheumatoid arthritis (HCC) (Chronic) POA: Yes    Chronic, continuous use of opioids POA: Unknown    H/o  essential hypertension (Chronic) POA: Yes    Paroxysmal atrial fibrillation (HCC) POA: Yes    S/P total knee arthroplasty, right POA: Yes    History of osteomyelitis POA: Yes    History of immunosuppressive therapy (Chronic) POA: Yes    Opioid dependence (HCC) POA: Yes    Ulcer of both feet (HCC) POA: Yes    Hypoglycemia POA: Unknown    Thrombocytopenia (HCC) POA: Unknown    Hyponatremia POA: Unknown    Electrolyte abnormality POA: Unknown    Yeast dermatitis POA: Unknown    Tenosynovitis of left lower leg POA: Unknown    Bacteremia POA: Unknown    Immunocompromised state due to drug therapy (HCC) POA: Unknown    Hypocalcemia POA: Unknown    Vitamin D deficiency POA: Unknown  Resolved Problems:    Encephalopathy POA: Unknown    Lactic acidosis POA: Unknown      FOLLOW UP  No future appointments.  Calvin  ELIZABETH Pryor M.D.  [address]  [place] [id number] [phone]    Schedule an appointment as soon as possible for a visit  For wound re-check and to discuss plan of care with possible need for surgical intervention    [facility]  [address]  [place] [address]  [phone]          Wayne Chambers M.D.  [address]  [place] [address]  [phone]    Follow up in 4 day(s)  For suture removal 4-6 weeks  Follow up with MARTÍN Turner or attending physycian at facility upon receipt  Follow upw ith Dr. Chambers, orthopedics postop visit in 1 week  Follow up with a rheumatologist in1 week for rheumatoid arthritis. Restart Humira when infx clears or able.   Follow up with Dr. Pryor, Orthopedics in 1 week for LLE tenosynovitis  Follow up with your gastroenterologist in 1-2 weeks for bleeding ulcers  Follow up with your cardiologist for parAfib (not on anticoagulation prob due to bleeding ulcer risk) in 1-2 weeks  Follow up with a pain specialist in 1 week for chronic pain.  Follow upw ith psychiatry or psychologist for pain and depression in 1-2 weeks.  Follow resources/pamphlets for  Obesity (Recommended weight loss advised, 5% through reduced calorie, low carb diet and 150 mins of exercise a week once better  Recommend bariatric surgery evaluation if morbidly obese  Educated on the increase of morbidity and mortality associated with excess weight including DM, Heart Disease, HTN, stroke, and sleep apnea. Recommended outpatient monitoring  of blood sugars, lipid panel, sleep study evaluation for metabolic syndrome), MONICA (recommend outpatient sleep study), rheumatoid arthritis, narcotic use (no swimming or drivibng and use bowel regimen, establish to a pain clinic), par Afib (follow cardiology; when cleared for GI bleeding consider aspirin or anticoagulation. Currently he even DECLINES pharmacologic DVT prophylaxis), smoking cessation (no more  smoking)    MEDICATIONS ON DISCHARGE     Medication List        START taking these medications        Instructions   carboxymethylcellulose 0.5 % Soln  Commonly known as: REFRESH TEARS   Administer 1 Drop into both eyes as needed (dry eye).  Dose: 1 Drop     DULoxetine 60 MG Cpep delayed-release capsule  Commonly known as: CYMBALTA   Take 1 Capsule by mouth every day.  Dose: 60 mg     methocarbamol 750 MG Tabs  Commonly known as: ROBAXIN   Take 1 Tablet by mouth 3 times a day as needed (spasm).  Dose: 750 mg     mirtazapine 7.5 MG tablet  Commonly known as: Remeron   Take 1 Tablet by mouth at bedtime as needed (insomnia).  Dose: 7.5 mg     polyethylene glycol/lytes 17 g Pack  Commonly known as: MIRALAX   Take 1 Packet by mouth 1 time a day as needed (if sennosides and docusate ineffective after 24 hours).  Dose: 17 g     pregabalin 150 MG Caps  Commonly known as: LYRICA   Take 1 Capsule by mouth 3 times a day for 3 days.  Dose: 150 mg     senna-docusate 8.6-50 MG Tabs  Commonly known as: PERICOLACE or SENOKOT S   Take 2 Tablets by mouth 2 times a day.  Dose: 2 Tablet     simethicone 125 MG chewable tablet  Commonly known as: Mylicon   Chew 1 Tablet 3 times a day as needed for Flatulence.  Dose: 125 mg     vitamin D2 (Ergocalciferol) 1.25 MG (75897 UT) Caps capsule  Commonly known as: Drisdol   Take 1 Capsule by mouth every 7 days.  Dose: 50,000 Units            CHANGE how you take these medications        Instructions   Naloxone 4 MG/0.1ML Liqd  What changed:   how much to take  how to take this  when to take this  reasons to take this  Commonly known as: NARCAN   One spray in one nostril for overdose and call 911.            CONTINUE taking these medications        Instructions   betamethasone dipropionate 0.05 % Crea   Apply 1 g topically to affected areas twice daily for 14 days.     Humira Pen 40 MG/0.4ML Pnkt  Generic drug: Adalimumab   INJECT 40MG SUBCUTANEOUSLY  EVERY 2 WEEKS     miconazole 2 % Crea  Commonly  "known as: MICOTIN   APPLY 1 GRAM TOPICALLY TWO TIMES A DAY     * morphine ER 15 MG Tbcr tablet  Commonly known as: MS CONTIN   Take 15 mg by mouth every 12 hours.  Dose: 15 mg     * morphine 15 MG tablet  Commonly known as: MS IR   Take 15 mg by mouth every 6 hours as needed. Indications: Pain  Dose: 15 mg     omeprazole 40 MG delayed-release capsule  Commonly known as: PRILOSEC   Take 1 capsule by mouth every day.  Dose: 40 mg           * This list has 2 medication(s) that are the same as other medications prescribed for you. Read the directions carefully, and ask your doctor or other care provider to review them with you.                  Allergies  Allergies   Allergen Reactions    Benadryl [Altaryl]      \"I get agitated\"    Nsaids      Bleeding, \"I had a bleeding ulcer\"    Phenergan [Promethazine Hcl]      \"I get very agitated\"    Penicillins      \"Had some dizziness\"  Tolerated Unasyn 10/2019  Tolerated Zosyn 02/2023    Remeron [Mirtazapine] Unspecified     I had a hard time waking up, lacking mental focus.        DIET  Orders Placed This Encounter   Procedures    Diet Order Diet: Regular     Standing Status:   Standing     Number of Occurrences:   1     Order Specific Question:   Diet:     Answer:   Regular [1]       ACTIVITY  Avoid heavy lifting or strenuous activity      LINES, DRAINS, AND WOUNDS  This is an automated list. Peripheral IVs will be removed prior to discharge.  Peripheral IV 03/19/23 22 G Anterior;Distal;Right Forearm (Active)   Site Assessment Intact;Dry;Clean 03/20/23 2054   Dressing Type Transparent;Occlusive 03/20/23 2054   Line Status Infusing 03/20/23 2054   Dressing Status Dry;Intact;Clean 03/20/23 2054   Dressing Intervention N/A 03/20/23 2054   Dressing Change Due 03/25/23 03/20/23 2054   Infiltration Grading ([facility], [facility]) 0 03/20/23 2054   Phlebitis Scale ([facility] Only) 0 03/20/23 2054       Moisture Associated Skin Damage 03/08/23 Buttock;Groin;Sacrum (Active)   Wound Image     " 03/08/23 1045   NEXT Weekly Photo (Inpatient Only) 03/15/23 03/13/23 2000   Drainage Amount None 03/20/23 2054   Drainage Description Serosanguineous 03/13/23 0815   Periwound Assessment Fragile;Blanchable erythema 03/20/23 2054   IAD Cleansing Foam Cleanser/Washcloth 03/20/23 2054   Periwound Protectant Antifungal Therapy;Barrier Paste 03/20/23 2054   IAD Containment Device None 03/20/23 2054   WOUND NURSE ONLY - Time Spent with Patient (mins) 30 03/08/23 1045       Wound 03/25/22 Full Thickness Wound Foot Right --Right Plantar Mid Foot (Active)   Site Assessment Red;Pink 03/20/23 2054   Periwound Assessment Intact 03/20/23 2054   Margins Defined edges 03/20/23 2054   Drainage Amount Scant 03/20/23 2054   Drainage Description Serous 03/20/23 2054   Treatments Cleansed;Offloading;Site care 03/20/23 2054   Wound Cleansing Normal Saline Irrigation 03/20/23 2054   Periwound Protectant Not Applicable 03/20/23 2054   Dressing Cleansing/Solutions Not Applicable 03/11/23 2033   Dressing Options Hydrofera Blue Classic;Nonadhesive Foam;Hypafix Tape 03/20/23 2054   Dressing Changed Changed 03/20/23 2054   Dressing Change/Treatment Frequency Every 48 hrs, and As Needed 03/20/23 2054       Wound 03/09/23 Incision Pretibial Proximal (Active)   Site Assessment JI 03/21/23 0755   Periwound Assessment JI 03/21/23 0755   Margins JI 03/21/23 0755   Closure JI 03/21/23 0755   Drainage Amount JI 03/21/23 0755   Drainage Description JI 03/21/23 0755   Treatments Cleansed;Offloading;Site care 03/20/23 2054   Wound Cleansing Normal Saline Irrigation 03/20/23 2054   Dressing Options Adaptic;Dry Roll Gauze;Ace Wrap 03/20/23 2054   Dressing Changed Changed 03/20/23 2054   Dressing Status Clean;Dry;Intact 03/21/23 0755   Dressing Change/Treatment Frequency Every 48 hrs, and As Needed 03/21/23 0755   NEXT Dressing Change/Treatment Date 03/22/23 03/20/23 2054       Peripheral IV 03/19/23 22 G Anterior;Distal;Right Forearm (Active)   Site  Assessment Intact;Dry;Clean 03/20/23 2054   Dressing Type Transparent;Occlusive 03/20/23 2054   Line Status Infusing 03/20/23 2054   Dressing Status Dry;Intact;Clean 03/20/23 2054   Dressing Intervention N/A 03/20/23 2054   Dressing Change Due 03/25/23 03/20/23 2054   Infiltration Grading ([facility] 0 03/20/23 2054   Phlebitis Scale ([facility] Only) 0 03/20/23 2054               MENTAL STATUS ON TRANSFER             CONSULTATIONS  ID   Ortho  Psych    PROCEDURES  See Dr. Chambers's OP NOTE    LABORATORY  Lab Results   Component Value Date    SODIUM 134 (L) 03/20/2023    POTASSIUM 3.9 03/20/2023    CHLORIDE 101 03/20/2023    CO2 27 03/20/2023    GLUCOSE 86 03/20/2023    BUN 9 03/20/2023    CREATININE 0.35 (L) 03/20/2023    CREATININE 0.95 10/19/2010    GLOMRATE >59 10/19/2010        Lab Results   Component Value Date    WBC 6.2 03/18/2023    WBC 8.4 10/19/2010    HEMOGLOBIN 9.0 (L) 03/18/2023    HEMATOCRIT 29.2 (L) 03/18/2023    PLATELETCT 319 03/18/2023        Total time of the discharge process exceeds 40 minutes.

## 2023-03-21 NOTE — PROGRESS NOTES
Bedside report received. Assumed care of patient PM 3/20. Assessment completed      Patient is A&O x 4, pt calls for assistance appropriately  Reports 7 /10 pain, prn medication administered.   Pt is r/a, no complaints of  SOB or difficulty breathing  Mobility x1 assist to  Bed alarm in on.  Voiding + in bedside urinal   Flatus + last BM        Plan of care reviewed with the patient. Bed is locked and in the lowest position. Call light is within reach. Patient encouraged to voice needs and concerns, all needs met at this time. Hourly rounding in place.

## 2023-03-21 NOTE — FLOWSHEET NOTE
03/21/23 1258   Events/Summary/Plan   Events/Summary/Plan o2 spot check Rt consult   Vital Signs   Pulse 74   Respiration 16   Pulse Oximetry 96 %   $ Pulse Oximetry (Spot Check) Yes   Respiratory Assessment   Level of Consciousness Alert   Chest Exam   Work Of Breathing / Effort Within Normal Limits   Breath Sounds   RUL Breath Sounds Clear   RML Breath Sounds Clear   RLL Breath Sounds Clear   DONALD Breath Sounds Clear   LLL Breath Sounds Clear   Oxygen   O2 Delivery Device None - Room Air

## 2023-03-21 NOTE — FLOWSHEET NOTE
03/21/23 1258   Protocol Assessment   Initial Assessment Yes   Patient History   Pulmonary Diagnosis MONICA   Procedures Relevant to Respiratory Status Home CPAP   Home O2 No   Nocturnal CPAP No   Home Treatments/Frequency No   Sleep Apnea Screening   Have you had a sleep study? No   Have you been diagnosed with sleep apnea? No

## 2023-03-21 NOTE — CARE PLAN
The patient is Watcher - Medium risk of patient condition declining or worsening     Shift Goals  Clinical Goals: safety     Problem: Knowledge Deficit - Standard  Goal: Patient and family/care givers will demonstrate understanding of plan of care, disease process/condition, diagnostic tests and medications  Note: Patient is a new admit. Educated on safety precautions and fall prevention measures. Covid testing is done, patient is to remain in isolation untill negative test result.

## 2023-03-22 NOTE — PROGRESS NOTES
"  Physical Medicine & Rehabilitation Progress Note    Encounter Date: 3/22/2023    Chief Complaint: Decreased mobility, leg pain    Interval Events (Subjective):  Patient sitting up with therapists. He reports his pain is still limiting. Discussed about scheduling pain medications in the daytime and therapists agree to try to schedule therapy around those times. Reviewed AM labs with anemia, hyponatremia, and normal blood sugars. Discussed case with dietitian who recommends MVI since he has history of Lucila-en-Y procedure.     Objective:  VITAL SIGNS: /74   Pulse 66   Temp 36.7 °C (98 °F) (Oral)   Resp 18   Ht 1.8 m (5' 10.87\")   Wt 95.8 kg (211 lb 3.2 oz)   SpO2 96%   BMI 29.57 kg/m²   Gen: NAD  Psych: Mood and affect appropriate  CV: RRR, 1+ edema  Resp: CTAB, no upper airway sounds  Abd: NTND  Neuro: AOx4, following commands    Laboratory Values:  Recent Results (from the past 72 hour(s))   POCT glucose device results    Collection Time: 03/19/23  1:40 PM   Result Value Ref Range    POC Glucose, Blood 83 65 - 99 mg/dL   POCT glucose device results    Collection Time: 03/19/23  5:44 PM   Result Value Ref Range    POC Glucose, Blood 88 65 - 99 mg/dL   Basic Metabolic Panel    Collection Time: 03/20/23 12:35 AM   Result Value Ref Range    Sodium 134 (L) 135 - 145 mmol/L    Potassium 3.9 3.6 - 5.5 mmol/L    Chloride 101 96 - 112 mmol/L    Co2 27 20 - 33 mmol/L    Glucose 86 65 - 99 mg/dL    Bun 9 8 - 22 mg/dL    Creatinine 0.35 (L) 0.50 - 1.40 mg/dL    Calcium 8.2 (L) 8.5 - 10.5 mg/dL    Anion Gap 6.0 (L) 7.0 - 16.0   ESTIMATED GFR    Collection Time: 03/20/23 12:35 AM   Result Value Ref Range    GFR (CKD-EPI) 141 >60 mL/min/1.73 m 2   POCT glucose device results    Collection Time: 03/20/23  1:10 AM   Result Value Ref Range    POC Glucose, Blood 80 65 - 99 mg/dL   POCT glucose device results    Collection Time: 03/20/23  7:52 AM   Result Value Ref Range    POC Glucose, Blood 82 65 - 99 mg/dL   POCT " glucose device results    Collection Time: 03/20/23 12:02 PM   Result Value Ref Range    POC Glucose, Blood 91 65 - 99 mg/dL   POCT glucose device results    Collection Time: 03/20/23  5:42 PM   Result Value Ref Range    POC Glucose, Blood 87 65 - 99 mg/dL   POCT glucose device results    Collection Time: 03/21/23  8:10 AM   Result Value Ref Range    POC Glucose, Blood 67 65 - 99 mg/dL   COV-2, FLU A/B, AND RSV BY PCR (2-4 HOURS CEPHEID): Collect NP swab in [facility]    Collection Time: 03/21/23 12:15 PM    Specimen: Respirate   Result Value Ref Range    Influenza virus A RNA Negative Negative    Influenza virus B, PCR Negative Negative    RSV, PCR Negative Negative    SARS-CoV-2 by PCR NotDetected     SARS-CoV-2 Source NP Swab    POCT glucose device results    Collection Time: 03/21/23  5:07 PM   Result Value Ref Range    POC Glucose, Blood 97 65 - 99 mg/dL   CBC with Differential    Collection Time: 03/22/23  5:26 AM   Result Value Ref Range    WBC 6.6 4.8 - 10.8 K/uL    RBC 3.16 (L) 4.70 - 6.10 M/uL    Hemoglobin 9.0 (L) 14.0 - 18.0 g/dL    Hematocrit 29.3 (L) 42.0 - 52.0 %    MCV 92.7 81.4 - 97.8 fL    MCH 28.5 27.0 - 33.0 pg    MCHC 30.7 (L) 33.7 - 35.3 g/dL    RDW 50.2 (H) 35.9 - 50.0 fL    Platelet Count 345 164 - 446 K/uL    MPV 8.5 (L) 9.0 - 12.9 fL    Neutrophils-Polys 53.40 44.00 - 72.00 %    Lymphocytes 33.70 22.00 - 41.00 %    Monocytes 7.90 0.00 - 13.40 %    Eosinophils 2.70 0.00 - 6.90 %    Basophils 1.20 0.00 - 1.80 %    Immature Granulocytes 1.10 (H) 0.00 - 0.90 %    Nucleated RBC 0.00 /100 WBC    Neutrophils (Absolute) 3.54 1.82 - 7.42 K/uL    Lymphs (Absolute) 2.23 1.00 - 4.80 K/uL    Monos (Absolute) 0.52 0.00 - 0.85 K/uL    Eos (Absolute) 0.18 0.00 - 0.51 K/uL    Baso (Absolute) 0.08 0.00 - 0.12 K/uL    Immature Granulocytes (abs) 0.07 0.00 - 0.11 K/uL    NRBC (Absolute) 0.00 K/uL   Comp Metabolic Panel (CMP)    Collection Time: 03/22/23  5:26 AM   Result Value Ref Range    Sodium 132 (L) 135 - 145  mmol/L    Potassium 4.2 3.6 - 5.5 mmol/L    Chloride 99 96 - 112 mmol/L    Co2 26 20 - 33 mmol/L    Anion Gap 7.0 7.0 - 16.0    Glucose 68 65 - 99 mg/dL    Bun 6 (L) 8 - 22 mg/dL    Creatinine 0.42 (L) 0.50 - 1.40 mg/dL    Calcium 8.3 (L) 8.5 - 10.5 mg/dL    AST(SGOT) 32 12 - 45 U/L    ALT(SGPT) 23 2 - 50 U/L    Alkaline Phosphatase 403 (H) 30 - 99 U/L    Total Bilirubin 0.3 0.1 - 1.5 mg/dL    Albumin 2.2 (L) 3.2 - 4.9 g/dL    Total Protein 7.8 6.0 - 8.2 g/dL    Globulin 5.6 (H) 1.9 - 3.5 g/dL    A-G Ratio 0.4 g/dL   HEMOGLOBIN A1C    Collection Time: 03/22/23  5:26 AM   Result Value Ref Range    Glycohemoglobin 5.0 4.0 - 5.6 %    Est Avg Glucose 97 mg/dL   TSH with Reflex to FT4    Collection Time: 03/22/23  5:26 AM   Result Value Ref Range    TSH 2.940 0.380 - 5.330 uIU/mL   Vitamin D, 25-hydroxy (blood)    Collection Time: 03/22/23  5:26 AM   Result Value Ref Range    25-Hydroxy   Vitamin D 25 11 (L) 30 - 100 ng/mL   ESTIMATED GFR    Collection Time: 03/22/23  5:26 AM   Result Value Ref Range    GFR (CKD-EPI) 134 >60 mL/min/1.73 m 2   CORRECTED CALCIUM    Collection Time: 03/22/23  5:26 AM   Result Value Ref Range    Correct Calcium 9.7 8.5 - 10.5 mg/dL   POCT glucose device results    Collection Time: 03/22/23  8:31 AM   Result Value Ref Range    POC Glucose, Blood 82 65 - 99 mg/dL       Medications:  Scheduled Medications   Medication Dose Frequency    nicotine  7 mg Daily-0600    multivitamin  1 Tablet DAILY    morphine  30 mg TID    senna-docusate  2 Tablet BID    acetaminophen  650 mg Q6HRS    calcium citrate  950 mg DAILY    DULoxetine  60 mg DAILY    morphine ER  30 mg Q12HRS    omeprazole  40 mg DAILY    pregabalin  150 mg TID    thiamine  100 mg DAILY    [START ON 3/28/2023] vitamin D2 (Ergocalciferol)  50,000 Units Q7 DAYS     PRN medications: Respiratory Therapy Consult, hydrALAZINE, acetaminophen, senna-docusate **AND** polyethylene glycol/lytes **AND** magnesium hydroxide **AND** bisacodyl, mag  hydrox-al hydrox-simeth, ondansetron **OR** ondansetron, traZODone, sodium chloride, carboxymethylcellulose, methocarbamol, mirtazapine, morphine, zolpidem    Diet:  Current Diet Order   Procedures    Diet Order Diet: Regular       Medical Decision Making and Plan:   L BKA - Patient with osteomyelitis while on DMARD for RA with multiple foot wounds. Patient is s/p left BKA on 3/9/23 with Dr. Chambers. NWB LLE. Needs IPOP vs extension brace.   -PT and OT for mobility and ADLs. Per guidelines, 15 hours per week between PT, OT and/or SLP.  -Follow-up Dr. Reyes CARVAJAL - Previously on medication. Currently on hold.      Tobacco Abuse - Patient on nicotine patch. Will need counseling     Anemia - Check AM CBC - 9.0 on admission, will monitor      Hyponatremia - Check AM CMP - 132, will continue to monitor     Hypocalcemia - Check AM CMP - 8.3 on admission     Morbid obesity due to excess calories - BMI Of 35.3 on admission, meets medical criteria.      Neuropathy - Patient on Lyrica TID     Pain - Patient on Morphine ER, Morphine IR PRN, Tylenol, and Lyrica  -Schedule Morphine , 1130, and 1500     Skin - Patient at risk for skin breakdown due to debility in areas including sacrum, achilles, elbows and head in addition to other sites. Nursing to assess skin daily.      GI Ppx - Patient on Prilosec for GERD prophylaxis. Patient on Senna-docusate for constipation prophylaxis.   Last BM: 03/19/23     DVT Ppx - Patient refusing AC on transfer. Patient reports he is aware of risks.  ____________________________________    T. Loi Pisano MD./PhD.  [facility] - Physical Medicine & Rehabilitation   [facility] - Brain Injury Medicine   ____________________________________    Total time:  51 minutes. Time spent included pre-rounding review of vitals and tests, unit/floor time, face-to-face time with the patient including physical examination, care coordination, counseling of patient and/or family, ordering  medications/procedures/tests, discussion with CM, PT, OT, SLP and/or other healthcare providers, and documentation in the electronic medical record. Topics discussed included admission labs, anemia, hyponatremia, pain control, and schedule opiates.

## 2023-03-22 NOTE — THERAPY
Physical Therapy   Daily Treatment     Patient Name: Richard Hubbard II  Age:  47 y.o., Sex:  male  Medical Record #: [id number]  Today's Date: 3/22/2023     Precautions  Precautions: Non Weight Bearing Left Lower Extremity, Weight Bearing As Tolerated Right Lower Extremity, Fall Risk  Comments: L BKA, R foot wound, RA - L shoulder and R wrist exacerbation    Subjective    Pt received supine in bed, w/ HOB elevated. Pt is pleasant and agreeable to PT tx session.       Objective       03/22/23 1431   Pain 0 - 10 Group   Location Wrist;Shoulder   Location Orientation Right;Left  (R wrist, L shoulder)   Description Aching   Comfort Goal 0   Therapist Pain Assessment Prior to Activity;During Activity  (due to RA)   Transfer Functional Level of Assist   Bed, Chair, Wheelchair Transfer Moderate Assist   Bed Chair Wheelchair Transfer Description Increased time;Initial preparation for task;Slideboard transfer from bed to wheelchair;Supervision for safety;Verbal cueing   Supine Lower Body Exercise   Straight Leg Raises 1 set of 10;Left   Sitting Lower Body Exercises   Long Arc Quad 2 sets of 10;Left   Bed Mobility    Supine to Sit Minimal Assist   Sit to Supine Contact Guard Assist  (elevated HOB)   Scooting Contact Guard Assist   Interdisciplinary Plan of Care Collaboration   IDT Collaboration with  Physical Therapist;Certified Nursing Assistant   Patient Position at End of Therapy In Bed;Bed Alarm On;Call Light within Reach;Tray Table within Reach   Collaboration Comments PT supervising SPT throughout session, CNA notified that urinal spilled in room     Performed 4 slideboard transfers from bed<>wc, wc<mat table, mat table<>wc, and wc<>bed all Mod A level.    Slight residual limb shaking during L LAQ, due to decrease proprioceptive input.    Education on contracture prevention at the hip and knee, importance of prone positioning for future session, and need for limb protector/knee immobilizer.     Assessment    Pt  tolerated seated and supine exercises well today. Moderate pain levels, but able to participate in all therapy activity. Good carryover w/ slideboard transfers and sequencing.     Strengths: Able to follow instructions, Effective communication skills, Good carryover of learning, Good insight into deficits/needs, Independent prior level of function, Making steady progress towards goals, Motivated for self care and independence, Pleasant and cooperative, Supportive family, Willingly participates in therapeutic activities  Barriers: Fatigue, Generalized weakness, Home accessibility, Impaired activity tolerance, Impaired balance, Limited mobility, Pain poorly managed, Pressure ulcer    Plan    LE strengthening via therEx especially in prone lying   Transition from slide board transfers to stand/squat pivot  Wc mobility and management   Residual limb desensitization  IND Residual limb management  Contracture prevention    Passport items to be completed:  Get in/out of bed safely, in/out of a vehicle, safely use mobility device, walk or wheel around home/community, navigate up and down stairs, show how to get up/down from the ground, ensure home is accessible, demonstrate HEP, complete caregiver training    Physical Therapy Problems (Active)       Problem: Balance       Dates: Start: 03/22/23         Goal: STG-Within one week, patient will tolerate AMPnoPRO assessment.        Dates: Start: 03/22/23               Problem: Mobility       Dates: Start: 03/22/23         Goal: STG-Within one week, patient will propel wheelchair up incline/ramp with CGA.       Dates: Start: 03/22/23               Problem: Mobility Transfers       Dates: Start: 03/22/23         Goal: STG-Within one week, patient will perform bed mobility with SBA.       Dates: Start: 03/22/23            Goal: STG-Within one week, patient will transfer bed to chair with CGA and slide board vs squat pivot.       Dates: Start: 03/22/23               Problem:  PT-Long Term Goals       Dates: Start: 03/22/23         Goal: LTG-By discharge, patient will propel wheelchair household distances with Mariano.        Dates: Start: 03/22/23            Goal: LTG-By discharge, patient will perform home exercise program independently.        Dates: Start: 03/22/23            Goal: LTG-By discharge, patient will transfer in/out of a car with supervision.        Dates: Start: 03/22/23            Goal: LTG-By discharge, patient will complete bed mobility with Mariano.        Dates: Start: 03/22/23            Goal: LTG-By discharge, patient will complete w/c to bed transfer using squat pivot vs slide board Mariano.        Dates: Start: 03/22/23

## 2023-03-22 NOTE — CARE PLAN
"The patient is Stable - Low risk of patient condition declining or worsening    Progress made toward(s) clinical / shift goals:      Problem: Bladder / Voiding  Goal: Patient will establish and maintain regular urinary output  Outcome: Progressing  Note: Patient is continent of bladder, voiding via urinal with staff emptying.       Patient is not progressing towards the following goals:    Problem: Fall Risk - Rehab  Goal: Patient will remain free from falls  Outcome: Not Met  Note: Osiris Healy Fall risk Assessment Score: 14    Moderate fall risk Interventions  - Bed and strip alarm   - Yellow sign by the door   - Yellow wrist band \"Fall risk\"  - Do not leave patient unattended in the bathroom  - Fall risk education provided     "

## 2023-03-22 NOTE — THERAPY
Physical Therapy   Initial Evaluation     Patient Name: Richard Hubbard II  Age:  47 y.o., Sex:  male  Medical Record #: [id number]  Today's Date: 3/22/2023     Subjective    Patient seated in w/c, nursing present, agreeable to therapy evaluation.      Objective       03/22/23 0831   PT Charge Group   PT Therapeutic Activities (Units) 1   PT Evaluation PT Evaluation Mod   PT Total Time Spent   PT Individual Total Time Spent (Mins) 60   Prior Living Situation   Prior Services Home-Independent   Housing / Facility 1 Story House   Steps Into Home 2  (2 steps from garage and front entry)   Steps In Home 0   Rail None   Bathroom Set up Walk In Shower;Tub Transfer Bench   Equipment Owned 4-Wheel Walker;Single Point Cane;Front-Wheel Walker;Tub Transfer Bench   Lives with - Patient's Self Care Capacity Spouse;Child Less than 18 Years of Age  (2 children, ages 7 and 4)   Comments Patient living at home in [place], with wife and two children. Wife is an NP working full time. Patient is primary care giver to his two children, 3yo and 6yo.   Prior Level of Functional Mobility   Bed Mobility Independent   Transfer Status Independent   Ambulation Independent   Distance Ambulation (Feet) 1000  (With 4WW)   Assistive Devices Used 4-Wheel Walker   Stairs Independent   Prior Functioning: Everyday Activities   Self Care Independent   Indoor Mobility (Ambulation) Independent   Stairs Independent   Functional Cognition Independent   Prior Device Use Walker   Pain 0 - 10 Group   Location Shoulder;Wrist   Location Orientation Left;Right   Pain Rating Scale (NPRS) 9   Cognition    Cognition / Consciousness WDL   Passive ROM Lower Body   Passive ROM Lower Body X   Comments Limited by body habitus, however within functional limits   Active ROM Lower Body    Active ROM Lower Body  WDL   Comments Assessed functionally, BLE AROM WFL   Strength Lower Body   Lower Body Strength  X   Rt Hip Extension Strength 3+ (F+)   Rt Hip Flexion Strength  3+ (F+)   Rt Hip Abduction Strength 3+ (F+)   Rt Hip Adduction Strength 4- (G-)   Rt Knee Flexion Strength 3+ (F+)   Rt Knee Extension Strength 3+ (F+)   Rt Ankle Dorsiflexion Strength 4- (G-)   Rt Ankle Plantar Flexion Strength 4- (G-)   Lt Hip Extension Strength 3 (F)   Lt Hip Flexion Strength 3- (F-)   Lt Hip Abduction Strength 3 (F)   Lt Hip Adduction Strength 3+ (F+)   Lt Knee Flexion Strength 3 (F)   Lt Knee Extension Strength 3 (F)   Sensation Lower Body   Lower Extremity Sensation   X   Comments Reports N/T in RLE, BLE intact light touch, tactile localization, and bilateral simultaneous stimulation.   Lower Body Muscle Tone   Lower Body Muscle Tone  Not Tested   Balance Assessment   Sitting Balance (Static) Poor +   Sitting Balance (Dynamic) Poor   Standing Balance (Static) Dependent   Standing Balance (Dynamic) Dependent   Bed Mobility    Supine to Sit Minimal Assist   Sit to Supine Contact Guard Assist   Sit to Stand Moderate Assist  (In parallel bars, unable to achieve upright posture, limited by pain)   Scooting Contact Guard Assist   Rolling Contact Guard Assist   Coordination Lower Body    Coordination Lower Body  Not Tested   Roll Left and Right   Assistance Needed Incidental touching   Physical Assistance Level No physical assistance   CARE Score - Roll Left and Right 4   Roll Left and Right Discharge Goal   Discharge Goal 6   Sit to Lying   Assistance Needed Incidental touching   Physical Assistance Level No physical assistance   CARE Score - Sit to Lying 4   Sit to Lying Discharge Goal   Discharge Goal 6   Lying to Sitting on Side of Bed   Assistance Needed Physical assistance   Physical Assistance Level 25% or less   CARE Score - Lying to Sitting on Side of Bed 3   Lying to Sitting on Side of Bed Discharge Goal   Discharge Goal 6   Sit to Stand   Assistance Needed Physical assistance   Physical Assistance Level 51%-75%   CARE Score - Sit to Stand 2   Sit to Stand Discharge Goal   Discharge Goal 4    Chair/Bed-to-Chair Transfer   Assistance Needed Physical assistance   Physical Assistance Level 51%-75%   CARE Score - Chair/Bed-to-Chair Transfer 2   Chair/Bed-to-Chair Transfer Discharge Goal   Discharge Goal 6   Car Transfer   Reason if not Attempted Environmental limitations   CARE Score - Car Transfer 10   Car Transfer Discharge Goal   Discharge Goal 4   Walk 10 Feet   Reason if not Attempted Safety concerns   CARE Score - Walk 10 Feet 88   Walk 10 Feet Discharge Goal   Discharge Goal 3   Walk 50 Feet with Two Turns   Reason if not Attempted Activity not applicable   CARE Score - Walk 50 Feet with Two Turns 9   Walk 50 Feet with Two Turns Discharge Goal   Discharge Goal 9   Walk 150 Feet   Reason if not Attempted Activity not applicable   CARE Score - Walk 150 Feet 9   Walk 150 Feet Discharge Goal   Discharge Goal 9   Walking 10 Feet on Uneven Surfaces   Reason if not Attempted Activity not applicable   CARE Score - Walking 10 Feet on Uneven Surfaces 9   Walking 10 Feet on Uneven Surfaces Discharge Goal   Discharge Goal 9   1 Step (Curb)   Reason if not Attempted Safety concerns   CARE Score - 1 Step (Curb) 88   1 Step (Curb) Discharge Goal   Discharge Goal 3   4 Steps   Reason if not Attempted Safety concerns   CARE Score - 4 Steps 88   4 Steps Discharge Goal   Discharge Goal 9   12 Steps   Reason if not Attempted Safety concerns   CARE Score - 12 Steps 88   12 Steps Discharge Goal   Discharge Goal 9   Picking Up Object   Reason if not Attempted Safety concerns   CARE Score - Picking Up Object 88   Picking Up Object Discharge Goal   Discharge Goal 6   Wheel 50 Feet with Two Turns   Assistance Needed Physical assistance   Physical Assistance Level 76% or more   CARE Score - Wheel 50 Feet with Two Turns 2   Type of Wheelchair/Scooter Manual   Wheel 50 Feet with Two Turns Discharge Goal   Discharge Goal 6   Wheel 150 Feet   Reason if not Attempted Safety concerns   CARE Score - Wheel 150 Feet 88   Wheel 150  Feet Discharge Goal   Discharge Goal 4   Gait Functional Level of Assist    Gait Level Of Assist Unable to Participate   Wheelchair Functional Level of Assist   Wheelchair Assist Moderate Assist   Distance Wheelchair (Feet or Distance) 10   Wheelchair Description Extra time;Limited by fatigue;Supervision for safety;Safety concerns;Verbal cueing  (limited by L shoulder and R wrist pain and RA)   Stairs Functional Level of Assist   Level of Assist with Stairs Unable to Participate   Transfer Functional Level of Assist   Bed, Chair, Wheelchair Transfer Moderate Assist   Bed Chair Wheelchair Transfer Description Adaptive equipment;Slideboard transfer from bed to wheelchair;Supervision for safety;Verbal cueing;Increased time;Initial preparation for task  (assist to place board and scoot)   Problem List    Problems Pain;Impaired Bed Mobility;Impaired Transfers;Impaired Ambulation;Functional ROM Deficit;Functional Strength Deficit;Impaired Balance;Impaired Coordination;Decreased Activity Tolerance;Motor Planning / Sequencing   Precautions   Precautions Non Weight Bearing Left Lower Extremity;Weight Bearing As Tolerated Right Lower Extremity;Fall Risk   Comments L BKA, R foot wound, RA - L shoulder and R wrist exacerbation   Current Discharge Plan   Current Discharge Plan Return to Prior Living Situation   Interdisciplinary Plan of Care Collaboration   IDT Collaboration with  Nursing;Occupational Therapist   Patient Position at End of Therapy In Bed;Bed Alarm On;Call Light within Reach;Tray Table within Reach;Phone within Reach   Collaboration Comments CLOF, transfers, pain   Benefit   Therapy Benefit Patient Would Benefit from Inpatient Rehabilitation Physical Therapy to Maximize Functional [place] with ADLs, IADLs and Mobility.   Strengths & Barriers   Strengths Able to follow instructions;Effective communication skills;Good carryover of learning;Good insight into deficits/needs;Independent prior level of  "function;Making steady progress towards goals;Motivated for self care and independence;Pleasant and cooperative;Supportive family;Willingly participates in therapeutic activities   Barriers Fatigue;Generalized weakness;Home accessibility;Impaired activity tolerance;Impaired balance;Limited mobility;Pain poorly managed;Pressure ulcer   LLE Girth Measurements:   - 2\" distal to inferior patella - 16.5\"    - 4\" distal to inferior patella -15.25\"    - 6\" distal to inferior patella - 14.75\"   - 8\" distal to inferior patella - 14.75\"     Education initiated on residual limb wrapping and amputee education.     Assessment  Patient is 47 y.o. male with a diagnosis of L BKA.  Patient presented on 2/28/23 with MRI evidence of osteomyelitis of the navicular bone, fever of 103, hypoglycemia, altered mental status, and cellulitis. Work up revealed tenosynovitis. Patient taken to OR on 3/9 for L BKA. Blood cultures have been positive for strep A. Complications to hospitalization have included difficulty controlling pain. Past medical history includes reported arthritis on humira with deformities and foot ulcers, anxiety, hemorrhagic disorders - history of bleeding ulcers, depression, RA, chronic pain, diabetes mellitus, osteomyelitis, gastric bypass surgery, and R TKA.  Additional factors influencing patient status / progress (ie: cognitive factors, co-morbidities, social support, etc): Patient is highly motivated to return prior level of independence.   Patient reporting prior independence with IADL's, ADL's, and household and community ambulation using 4WW. Patient is primary care taker for his two young children, ages 4 and 7. He reports living in a one story home in [place] with his children and wife, who is an NP working full time and unable to assist patient after discharge. Patient reports access to home requires two steps without railings. Patient reports that he enjoys fishing, watching movies, and wood working in his " leisure time.   Primary impairments include decreased BLE strength, decreased LLE ROM, balance, decreased activity tolerance/endurance, hypersensitivity, impaired motor planning and motor control. Patient is eager to participate despite being limited by pain in L shoulder and R wrist from RA flare up. Patient was unable to participate in thorough gait assessment despite using parallel bars due to increased pain. Potential barriers include medical comorbidities, home set up, ability for assistance upon discharge. Patient also with R plantar surface foot wound and baseline RA foot deformity, impacting tolerance to stand. Of note, patient endorsing 1 fall in the past year due to tripping on kid's toys resulting in a L foot fracture. Patient will benefit from skilled physical therapy to address current impairments and maximize functional mobility and safety prior to discharge.       Plan  Recommend Physical Therapy  minutes per day 5-7 days per week for 2-3 weeks for the following treatments:  PT Group Therapy, PT Prosthetic Training, PT Gait Training, PT Therapeutic Exercises, PT Neuro Re-Ed/Balance, PT Therapeutic Activity, and PT Evaluation.    HEP (BLE proximal strengthening, core, glutes, prone hip extension), residual limb dressing education, skin inspections, prosthetic education, AMPnoPRO assessment, bed mobility, transfers slide board vs squat pivot, w/c negotiation on ramps/inclines, w/c propulsion, energy conservation strategies, balance, ambulation progressing from parallel bars to LRAD, curb and stair negotiation to simulate home set up    Passport items to be completed:  Get in/out of bed safely, in/out of a vehicle, safely use mobility device, walk or wheel around home/community, navigate up and down stairs, show how to get up/down from the ground, ensure home is accessible, demonstrate HEP, complete caregiver training    Goals:  Long term and short term goals have been discussed with patient and  they are in agreement.    Physical Therapy Problems (Active)       Problem: Balance       Dates: Start: 03/22/23         Goal: STG-Within one week, patient will tolerate AMPnoPRO assessment.        Dates: Start: 03/22/23               Problem: Mobility       Dates: Start: 03/22/23         Goal: STG-Within one week, patient will propel wheelchair up incline/ramp with CGA.       Dates: Start: 03/22/23               Problem: Mobility Transfers       Dates: Start: 03/22/23         Goal: STG-Within one week, patient will perform bed mobility with SBA.       Dates: Start: 03/22/23            Goal: STG-Within one week, patient will transfer bed to chair with CGA and slide board vs squat pivot.       Dates: Start: 03/22/23               Problem: PT-Long Term Goals       Dates: Start: 03/22/23         Goal: LTG-By discharge, patient will propel wheelchair household distances with Mariano.        Dates: Start: 03/22/23            Goal: LTG-By discharge, patient will perform home exercise program independently.        Dates: Start: 03/22/23            Goal: LTG-By discharge, patient will transfer in/out of a car with supervision.        Dates: Start: 03/22/23            Goal: LTG-By discharge, patient will complete bed mobility with Mariano.        Dates: Start: 03/22/23            Goal: LTG-By discharge, patient will complete w/c to bed transfer using squat pivot vs slide board Mariano.        Dates: Start: 03/22/23

## 2023-03-22 NOTE — H&P
Physical Medicine & Rehabilitation  History and Physical (H&P)  &     Post Admission Physician Evaluation (ZACHARIAH)       Date of Admission: 3/21/2023  Date of Service: 3/21/2023   Richard Hubbard II  RH04/02    [facility] Code to Support Admission: 0005.4 - Amputation: Unilateral Lower Limb Below the Knee (BK)  Etiologic Diagnosis: S/P BKA (below knee amputation) unilateral, left (HCC)    _______________________________________________    Chief Complaint: Decreased mobility, pain    History of Present Illness:  Adapted from the PM&R Consult by Dr. Pascual:   The patient is a 47 y.o. right hand dominant male with a past medical history of reported arthritis on Humira with deformities and foot ulcers;  who presented on 2/28/2023 10:51 AM with MRI evidence of osteomyelitis of the left navicular bone fever of 103, hypoglycemia, altered mental status and cellulitis.  Patient was seen by orthopedic surgery, LPS and infectious disease.  He is currently on cefazolin 3 times daily through 3/12/2023.  Patient went marked left knee through foot yesterday which found tenosynovitis. Chart review reveals that Mr. Hubbard is a prior patient of [facility], with his most recent admission in July 2020, where he attended rehab following right knee replacement and was successfully discharged home after 11 days. Patient was taken to the OR on 3/9 for L BKA performed by Dr. Chambers. Blood cultures have been  + for strep A. Patient has been on IV abx, but will be switched to augementin.  Patient's hospital stay has been complicated by difficult to control pain control and has been changed to long acting morphine, morphine IR PRN and Lyrica.     Patient tolerated transfer to [facility]. He reports 8/10 pain. He reports it is mostly into his left limb but also with some pain to his right foot ulcers. He reports he was given an extension brace but he does not know where it is. He has paperwork at bedside from [place] but no brace is  noted. Denies fever or chills. Denies NVD. Denies SOB.     Review of Systems:     Comprehensive 14 point ROS was reviewed and all were negative except as noted elsewhere in this document.     Past Medical History:  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    Hemorrhagic disorder (HCC)     hx of bleeding ulcers    History of bleeding ulcers 2/12/2015    Hypertension 05/03/2021    pt states well controlled on meds    Insomnia 5/21/2009    Migraine 5/21/2009    Morning headache     Obesity, morbid ([facility]) 5/21/2009    Osteomyelitis ([facility]) 10/7/2019    Pain 09/2019    Chronic knee and back pain    Pubic ramus fracture ([facility]) 9/28/2019    Rheumatoid arteritis ([facility]) 09/2019    knee, feet right hand    Sleep apnea 5/21/2009    Did not tolerate cpap, does not use it    Snoring        Past Surgical History:  Past Surgical History:   Procedure Laterality Date    KNEE AMPUTATION BELOW Left 3/9/2023    Procedure: AMPUTATION, BELOW KNEE;  Surgeon: Wayne Chambers M.D.;  Location: SURGERY [facility];  Service: Orthopedics    PB TOTAL KNEE ARTHROPLASTY Right 7/22/2020    Procedure: ARTHROPLASTY, KNEE, TOTAL;  Surgeon: Temo Pires M.D.;  Location: SURGERY [facility];  Service: Orthopedics    TENDON TRANSFER Right 1/22/2020    Procedure: RIGHT DISTAL ULNA RESECTION AND EXTENSOR TENDON TRANSFER;  Surgeon: Marine Rushing M.D.;  Location:  SURGERY [facility];  Service: Orthopedics    OTHER ORTHOPEDIC SURGERY Right 2020    total right knee replacement    IRRIGATION & DEBRIDEMENT ORTHO Left 10/9/2019    Procedure: IRRIGATION AND DEBRIDEMENT, WOUND - PELVIC OSTEOMYELITIS;  Surgeon: You Olivares M.D.;  Location: SURGERY [facility];  Service: Orthopedics    OTHER SURGICAL PROCEDURE  2019    osteomelitis of pelvis cleaned    GASTRIC BYPASS LAPAROSCOPIC  2000    Lucila en y    CHOLECYSTECTOMY  2000    OTHER SURGICAL PROCEDURE      repair of broken penis       Family History:  Family History   Problem Relation Age of Onset    Hypertension Mother     Arthritis Mother     Depression Mother     Cancer Father         bladder    Schizophrenia Father     Cancer Maternal Grandmother         breast    Heart Disease Maternal Grandmother     Psychiatric Illness Other     Stroke Maternal Aunt     Other Neg Hx         no connective tissue disorders or known aortic disease       Medications:  Current Facility-Administered Medications   Medication Dose    Respiratory Therapy Consult      hydrALAZINE (APRESOLINE) tablet 25 mg  25 mg    acetaminophen (Tylenol) tablet 650 mg  650 mg    senna-docusate (PERICOLACE or SENOKOT S) 8.6-50 MG per tablet 2 Tablet  2 Tablet    And    polyethylene glycol/lytes (MIRALAX) PACKET 1 Packet  1 Packet    And    magnesium hydroxide (MILK OF MAGNESIA) suspension 30 mL  30 mL    And    bisacodyl (DULCOLAX) suppository 10 mg  10 mg    mag hydrox-al hydrox-simeth (MAALOX PLUS ES or MYLANTA DS) suspension 20 mL  20 mL    ondansetron (ZOFRAN ODT) dispertab 4 mg  4 mg    Or    ondansetron (ZOFRAN) syringe/vial injection 4 mg  4 mg    traZODone (DESYREL) tablet 50 mg  50 mg    sodium chloride (OCEAN) 0.65 % nasal spray 2 Spray  2 Spray    acetaminophen (Tylenol) tablet 650 mg  650 mg    [START ON 3/22/2023] calcium citrate (CALCITRATE) tablet 950 mg  950 mg    carboxymethylcellulose (REFRESH TEARS) 0.5 % ophthalmic drops 1 Drop  1 Drop     [START ON 3/22/2023] DULoxetine (CYMBALTA) capsule 60 mg  60 mg    methocarbamol (ROBAXIN) tablet 750 mg  750 mg    mirtazapine (Remeron) orally disintegrating tab 7.5 mg  7.5 mg    morphine (MS IR) tablet 30 mg  30 mg    morphine ER (MS CONTIN) tablet 30 mg  30 mg    [START ON 3/22/2023] nicotine (NICODERM) 7 MG/24HR 7 mg  7 mg    [START ON 3/22/2023] omeprazole (PRILOSEC) capsule 40 mg  40 mg    pregabalin (LYRICA) capsule 150 mg  150 mg    [START ON 3/22/2023] thiamine (Vitamin B-1) tablet 100 mg  100 mg    [START ON 3/28/2023] vitamin D2 (Ergocalciferol) (Drisdol) capsule 50,000 Units  50,000 Units    zolpidem (AMBIEN) tablet 5 mg  5 mg       Allergies:  Benadryl [altaryl], Nsaids, Phenergan [promethazine hcl], Penicillins, and Remeron [mirtazapine]    Psychosocial History:  Housing / Facility: 1 Story House ([address])  Steps Into Home: 2  Lives with - Patient's Self Care Capacity: Spouse, Child Less than 18 Years of Age  Equipment Owned: 4-Wheel Walker, Tub / Shower Seat     Prior Level of Function / Living Situation:   Physical Therapy: Prior Services: Home-Independent  Housing / Facility: 1 Story House ([address])  Steps Into Home: 2  Bathroom Set up: Walk In Shower  Equipment Owned: 4-Wheel Walker, Tub / Shower Seat  Lives with - Patient's Self Care Capacity: Spouse, Child Less than 18 Years of Age  Bed Mobility: Independent  Transfer Status: Independent  Ambulation: Independent  Assistive Devices Used: 4-Wheel Walker  Stairs: Independent  Current Level of Function:   Gait Level Of Assist: Unable to Participate  Weight Bearing Status: L LE NWB; R LE WBAT  Supine to Sit: Supervised (use of bed features)  Sit to Supine:  (up in w/c post)  Scooting: Contact Guard Assist  Rolling: Supervised  Skilled Intervention: Verbal Cuing, Sequencing, Compensatory Strategies  Comments: Pt demonstrated increased independence in scooting for slideboard transfer, requires increased time and energy 2/2 to B UE weakness  "and pain  Sit to Stand:  (NT)  Bed, Chair, Wheelchair Transfer: Minimal Assist (for slideboard placement and removal)  Transfer Method: Slide Board  Skilled Intervention: Compensatory Strategies, Facilitation, Sequencing, Verbal Cuing, Tactile Cuing  Sitting in Chair: > 15 min (up in w/c post)  Sitting Edge of Bed: 7 min  Standing: NT  Occupational Therapy:   Self Feeding: Independent  Grooming / Hygiene: Independent  Bathing: Independent  Dressing: Independent  Toileting: Independent  Medication Management: Independent  Laundry: Independent  Kitchen Mobility: Independent  Finances: Independent  Home Management: Independent  Prior Level Of Mobility: Independent With Device in Community, Independent With Device in Home  Prior Services: Home-Independent  Housing / Facility: 1 Story House ([address])  Current Level of Function:   Bathing:  (declined)  Upper Body Dressing: Minimal Assist  Lower Body Dressing: Moderate Assist  Toileting:  (no need for BM at this time. Pt uses urinal Indep)  Skilled Intervention: Verbal Cuing, Tactile Cuing, Sequencing, Compensatory Strategies  Comments: Pt will need assist with most self care tasks at this time. Pt stated he \"has no one at home that can help.\"    CURRENT LEVEL OF FUNCTION:   Same as level of function prior to admission to [facility]    Physical Examination:     VITAL SIGNS:   height is 1.646 m (5' 4.8\") and weight is 95.8 kg (211 lb 3.2 oz). His oral temperature is 36.6 °C (97.9 °F). His blood pressure is 130/77 and his pulse is 90. His respiration is 18 and oxygen saturation is 94%.   GENERAL: No apparent distress  HEENT: Normocephalic/atraumatic, EOMI, and PERRL  CARDIAC: Regular rate and rhythm, normal S1, S2   LUNGS: Clear to auscultation   ABDOMINAL: bowel sounds present, soft, and nontender    EXTREMITIES: no contractures, spasticity, or edema.  No calf tenderness bilaterally, 2+ bilateral DP/PT pulses.  NEURO:  Mental status:  A&Ox4 (person, " place, date, situation) answers questions appropriately  Speech: fluent, no aphasia or dysarthria  Motor:    5/5 BUE  5/5 proximal RLE, distal NT due to pain  3/5 L HF  Sensory: intact to light touch through out      Radiology:  MRI foot  IMPRESSION:     1.  Again seen marrow edema involving the medial aspect of the navicular and the adjacent first cuneiform with some enhancement of that area with overlying soft tissue ulceration and some fluid within the overlying soft tissues consistent with   osteomyelitis versus reactive change. This is unchanged from comparison.     2.  Marrow edema with small nondisplaced trabecular fracture involving the first metatarsal head with enhancement of that area. Differential diagnosis includes nondisplaced stress/insufficiency fracture versus osteomyelitis.     3.  Again seen marrow edema involving the base of the second metatarsal with some abnormal enhancement with a small possible linear nondisplaced trabecular fracture extending through that area likely representing a small nondisplaced insufficiency   fracture. There is also arthropathy of that joint.     4.  Again seen marrow edema involving the cuneiforms and the adjacent metatarsals likely representing osteoarthritis.     5.  Osteoarthritis of the tibiotalar joint with chronic osteochondral injury of the lateral aspect of the talar dome. There is also osteoarthritis of the subtalar, talonavicular and calcaneocuboid articulations.     6.  Cellulitis involving the hindfoot most prominently medially.     7.  Prominent tenosynovitis of the tibialis posterior tendon and to a lesser degree the flexor digitorum longus and flexor hallucis tendons. This underlies the area of soft tissue ulceration and could potentially represent infectious tenosynovitis. This   is new from comparison.     8.  Pes planus and valgus deformity of the hindfoot.    Laboratory Values:  Recent Labs     03/19/23  0416 03/20/23  0035   SODIUM 127* 134*    POTASSIUM 4.2 3.9   CHLORIDE 94* 101   CO2 24 27   GLUCOSE 91 86   BUN 8 9   CREATININE 0.50 0.35*   CALCIUM 7.9* 8.2*               Primary Rehabilitation Diagnosis:    This patient is a 47 y.o. male admitted for acute inpatient rehabilitation with   S/P BKA (below knee amputation) unilateral, left (HCC).    Impairments:   ADLs/IADLs  Mobility    Secondary Diagnosis/Medical Co-morbidities Affecting Function  RA  Neuropathy  Tobacco Abuse  Anemia  Hyponatremia  Hypocalcemia    Relevant Changes Since Preadmission Evaluation:    Status unchanged    The patient's rehabilitation potential is Good  The patient's medical prognosis is good    Rehabilitation Plan:   Discussion and Recommendations:   1. The patient requires an acute inpatient rehabilitation program with a coordinated program of care at an intensity and frequency not available at a lower level of care. This recommendation is substantiated by the patient's medical physicians who recommend that the patient's intervention and assessment of medical issues needs to be done at an acute level of care for patient's safety and maximum outcome.   2. A coordinated program of care will be supplied by an interdisciplinary team of physical therapy, occupational therapy, rehab physician, rehab nursing, and, if needed, speech therapy and rehab psychology. Rehab team presents a patient-specific rehabilitation and education program concentrating on prevention of future problems related to accessibility, mobility, skin, bowel, bladder, sexuality, and psychosocial and medical/surgical problems.   3. Need for Rehabilitation Physician: The rehab physician will be evaluating the patient on a multi-weekly basis to help coordinate the program of care. The rehab physician communicates between medical physicians, therapists, and nurses to maximize the patient's potential outcome. Specific areas in which the rehab physician will be providing daily assessment include the following:    A. Assessing the patient's heart rate and blood pressure response (vitals monitoring) to activity and making adjustments in medications or conservative measures as needed.   B. The rehab physician will be assessing the frequency at which the program can be increased to allow the patient to reach optimal functional outcome.   C. The rehab physician will also provide assessments in daily skin care, especially in light of patient's impairments in mobility.   D. The rehab physician will provide special expertise in understanding how to work with functional impairment and recommend appropriate interventions, compensatory techniques, and education that will facilitate the patient's outcome.   4. Rehab R.N.   The rehab RN will be working with patient to carry over in room mobility and activities of daily living when the patient is not in 3 hours of skilled therapy. Rehab nursing will be working in conjunction with rehab physician to address all the medical issues above and continue to assess laboratory work and discuss abnormalities with the treating physicians, assess vitals, and response to activity, and discuss and report abnormalities with the rehab physician. Rehab RN will also continue daily skin care, supervise bladder/bowel program, instruct in medication administration, and ensure patient safety.   5. Rehab Therapy: Therapies to treat at intensity and frequency of (may change after completion of evaluation by all therapeutic disciplines):       PT:  Physical therapy to address mobility, transfer, gait training and evaluation for adaptive equipment needs 1 and 1/2 hour/day at least 5 days/week for the duration of the ELOS (see below)       OT:  Occupational therapy to address ADLs, self-care, home management training, functional mobility/transfers and assistive device evaluation, and community re-integration 1 and 1/2 hour/day at least 5 days/week for the duration of the ELOS (see below).     Medical management /  Rehabilitation Issues/ Adverse Potential as part of rehabilitation plan     Rehabilitation Issues/Adverse Potential  1.  L BKA - Patient with osteomyelitis while on DMARD for RA with multiple foot wounds. Patient is s/p left BKA on 3/9/23 with Dr. Chambers. NWB LLE. Needs IPOP vs extension brace. Patient demonstrates functional deficits in strength, balance, coordination, and ADL's. Patient is admitted to [facility] for comprehensive rehabilitation therapy as described below.   Rehabilitation nursing monitors bowel and bladder control, educates on medication administration, co-morbidities and monitors patient safety.    2.  Neurostimulants: None at this time but continue to assess daily for need to initiate should status change.    3.  DVT prophylaxis:  Patient is refusing AC upon transfer. Encourage OOB. Monitor daily for signs and symptoms of DVT including but not limited to swelling and pain to prevent the development of DVT that may interfere with therapies.    4.  GI prophylaxis:  On prilosec to prevent gastritis/dyspepsia which may interfere with therapies.    5.  Pain: No issues with pain currently / Controlled with APAP/Lyrica/Morphine    6.  Nutrition/Dysphagia: Dietician monitors nutrient intake, recommend supplements prn and provide nutrition education to pt/family to promote optimal nutrition for wound healing/recovery.     7.  Bladder/bowel:  Start bowel and bladder program as described below, to prevent constipation, urinary retention (which may lead to UTI), and urinary incontinence (which will impact upon pt's functional independence).   - Post void bladder scans, I&O cath for PVRs >400  - up to commode after meal     8.  Skin/dermal ulcer prophylaxis: Monitor for new skin conditions with q.2 h. turns as required to prevent the development of skin breakdown.     9.  Cognition/Behavior: As needed psychologist provides adjustment counseling to illness and psychosocial barriers that  may be potential barriers to rehabilitation.     10. Respiratory therapy: RT performs O2 management prn, breathing retraining, pulmonary hygiene and bronchospasm management prn to optimize participation in therapies.     Medical Co-Morbidities/Adverse Potential Affecting Function:   L BKA - Patient with osteomyelitis while on DMARD for RA with multiple foot wounds. Patient is s/p left BKA on 3/9/23 with Dr. Chambers. NWB LLE. Needs IPOP vs extension brace.   -PT and OT for mobility and ADLs. Per guidelines, 15 hours per week between PT, OT and/or SLP.  -Follow-up Dr. Reyes CARVAJAL - Previously on medication. Currently on hold.      Tobacco Abuse - Patient on nicotine patch. Will need counseling    Anemia - Check AM CBC    Hyponatremia - Check AM CMP    Hypocalcemia - Check AM CMP    Morbid obesity due to excess calories - BMI Of 35.3 on admission, meets medical criteria.     Neuropathy - Patient on Lyrica TID    Pain - Patient on Morphine ER, Morphine IR PRN, Tylenol, and Lyrica    Skin - Patient at risk for skin breakdown due to debility in areas including sacrum, achilles, elbows and head in addition to other sites. Nursing to assess skin daily.     GI Ppx - Patient on Prilosec for GERD prophylaxis. Patient on Senna-docusate for constipation prophylaxis.   Last BM: 03/19/23    DVT Ppx - Patient refusing AC on transfer. Patient reports he is aware of risks    I personally performed a complete drug regimen review and no potential clinically significant medication issues were identified.     Goals/Expected Level of Function Based on Current Medical and Functional Status:  (may change based on patient's medical status and rate of impairment recovery)  Transfers:   Modified Independent  Mobility/Gait:   Modified Independent  ADL's:   Modified Independent    DISPOSITION: Discharge to pre-morbid independent living setting with the supportive care of patient's family.    ELOS: 10-17  days  ____________________________________    T. Loi Pisano MD./PhD.  [facility] - Physical Medicine & Rehabilitation   [facility] - Brain Injury Medicine   ____________________________________    Pt was seen today for 75 min, and entire time spent in face-to-face contact was >50% in counseling and coordination of care as detailed in A/P above.

## 2023-03-22 NOTE — DIETARY
"Nutrition services: Day 1 of admit.  Richard Hubbard II is a 47 y.o. male with admitting DX of sepsis (HCC), S/P BKA (below knee amputation) unilateral, left (HCC)  Consult received for MST of 3- pt with unsure amount of wt loss and poor PO PTA.      Assessment:  Height: 180 cm (5' 11\") per NV ID 2/08/19  Weight: 95.8 kg (211 lb 3.2 oz)  Body mass index is 29.57 kg/m² (BMI adjusted for BKA: 31.5 kg/m²), BMI classification: Obesity Class 1  Diet/Intake: Regular    Evaluation:   Hospital problems: S/P BKA, left 3/9/23, opioid dependence, sepsis, LLE tenosynovitis, GAS bacteremia; noted history of Lucila en y.  PO % x1 meal; Per RD note 3/28, pt requested to stop receiving Boost Plus. He also doesn't eat mch dairy. Pt is \"unable to eat thick meats-has a hard time digesting thick meats.\" Pt says he tolerates ground meats better than thick cuts.   LBM 3/19 +senna  RLE edema 2+; generalized; LLE edema 2+; dependent  Medications: calcium citrate and thiamine, PRN remeron, vit D2 ordered (not given)  Rd visited pt. Pt said he went from a 41 to 38 pant size in the past year. He didn't know PO intake when at home. Pt said that he eats dairy just not milk. He avoids spicy foods too. He is not currently taking a MV at home. Since pt said he didn't have time to eat at home, RD provided ideas for how to increase intake on busy days. RD offered protein snacks between meals. Pt agreed.   Nutrition Focused Physical Exam performed. Scooping on pts temporal region. Protrusion in clavicle bone region. Orbital region was sunken, hollowed in appearance.  Appears he has lost 20lbs in a year (~13%, not significant). However, wt adjusted for BKA which is consistent with wt 1 year ago. Edema could be masking weight loss.    Malnutrition Risk: Moderate malnutrition in the context of social/environmental circumstances and/or chronic illness related to pt reports that he has limited time to eat at home and GI issues (constipation, " reported hernia) as evidenced by severe muscle wasting in temporal region, moderate muscle wasting in clavicle region, and moderate fat loss in orbital region.    Recommendations/Plan:  Add BID protein snacks for pt to bolster kcal and nutrition intake.  Trial ground meats based on pt preferences. Updated nutrition rep.  Because of history, recommended MVI. Requested from MD. MD confirmed, received recommendation.   Encourage intake of >75% of meals and snacks  Document intake of all meals and snacks as % taken in ADL's to provide interdisciplinary communication across all shifts.   Monitor weight.  Nutrition rep will continue to see patient for ongoing meal and snack preferences.     RD following.

## 2023-03-22 NOTE — CARE PLAN
The patient is Stable - Low risk of patient condition declining or worsening       Problem: Skin Integrity  Goal: Skin integrity is maintained or improved  Outcome: Progressing  Note: Patient has left BKA with sutures and is well approximated also, wound on right dorsal foot with hydrafera blue, foam and hydra-fix tape. Redness to buttock. All pictures taken and in pt chart.

## 2023-03-22 NOTE — DISCHARGE PLANNING
CASE MANAGEMENT INITIAL ASSESSMENT    Admit Date:  3/21/2023     I met with patient to discuss role of case management / discharge planning / team conference.   Patient is a  47 y.o. male transferred from [facility] where he was hospitalized from 2/28 to 3/21.     PT was hospitalized at [facility] from 7/27/20 to 8/8/20 and 10/17 to 11/2/20    Diagnosis: Sepsis ([facility]) [A41.9]  S/P BKA (below knee amputation) unilateral, left  on 3/9 by Dr Chambers    Co-morbidities:   Patient Active Problem List    Diagnosis Date Noted    S/P BKA (below knee amputation) unilateral, left ([facility]) 03/21/2023    Vitamin D deficiency 03/14/2023    Hypocalcemia 03/11/2023    Bacteremia 03/08/2023    Immunocompromised state due to drug therapy ([facility]) 03/08/2023    Tenosynovitis of left lower leg 03/06/2023    Yeast dermatitis 03/04/2023    Electrolyte abnormality 03/01/2023    Sepsis, LLE tenosynovitis, GAS bacteremia ([facility]) 02/28/2023    Hypoglycemia 02/28/2023    Thrombocytopenia ([facility]) 02/28/2023    Hyponatremia 02/28/2023    Debility 06/15/2022    Impaired mobility and ADLs 06/15/2022    Ulcer of both feet ([facility]) 06/15/2022    Personal history of COVID-19 01/31/2022    Opioid dependence ([facility]) 01/18/2021    Other chronic pain 01/18/2021    Multiple falls 12/10/2020    Localized osteoporosis without current pathological fracture 10/19/2020    History of osteomyelitis 08/14/2020    History of immunosuppressive therapy 08/14/2020    S/P total knee arthroplasty, right 07/27/2020    Paroxysmal atrial fibrillation ([facility]) 07/23/2020    Cigarette nicotine dependence without complication 07/21/2020    Aortic root dilatation ([facility]) 09/13/2019    H/o  essential hypertension 09/13/2019    Aortic stenosis, moderate 09/12/2019    Rheumatoid arthritis ([facility]) 09/08/2015    Chronic, continuous use of opioids 09/08/2015    Chronic pain of right knee 09/08/2015    History of bleeding ulcers 02/12/2015    MONICA (obstructive sleep apnea)  03/09/2010    Mood disorder (HCC) 05/21/2009    ELISEO (generalized anxiety disorder) 05/21/2009     Prior Living Situation:  Housing / Facility: 1 Story House; 2 HASEEB ; +walk in shower;   Lives with - Patient's Self Care Capacity: Spouse, Child Less than 18 Years of Age - age 4 and 7    Prior Level of Function:  Medication Management: Independent  Finances: Independent  Home Management: Independent  Shopping: Independent  Prior Level Of Mobility: Independent With Device in Community, Independent With Device in Home, Independent With Steps in Community, Independent With Steps in Home  Driving / Transportation: Driving Independent    Support Systems:  Primary : Spouse Katiana Hubbard    Other support systems: Mother Priscila Hubbard   Advance Directives: No  Power of  (Name & Phone): none    Previous Services Utilized:   Equipment Owned: Front-Wheel Walker, 4-Wheel Walker, Tub Transfer Bench  Prior Services:  Has used [facility]/ [facility] in the past.     Other Information:  Occupation (Pre-Hospital Vocational): Homemaker  Primary Payor Source: [employer]  Primary Care Practitioner : Brit Fitch PCP  Other MDs: Dr Chambers; Dr Conway Rheumatoid Arthritis    Patient / Family Goal:  Patient / Family Goal: improve mobility; return home where he lives w/ spouse    Plan:  1. Continue to follow patient through hospitalization and provide discharge planning in collaboration with patient, family, physicians and ancillary services.     2. Utilize community resources to ensure a safe discharge.

## 2023-03-22 NOTE — THERAPY
Occupational Therapy   Initial Evaluation     Patient Name: Richard Hubbard II  Age:  47 y.o., Sex:  male  Medical Record #: [id number]  Today's Date: 3/22/2023     Subjective    Pt seen 2x this day, once at 0700 for eval and tx, and again at 1000 to complete IRF-ARTURO and tx. Pt limited by shoulder and wrist pain with all functional transfers and ADLs, but agreeable to make any and all attempts.      Objective       03/22/23 0701 03/22/23 1001   OT Charge Group   Charges Yes  --    OT Self Care / ADL (Units) 1 1   OT Therapy Activity (Units)  --  1   OT Evaluation OT Evaluation High  --    OT Total Time Spent   OT Individual Total Time Spent (Mins) 60 30   Prior Living Situation   Prior Services Home-Independent  --    Housing / Facility 1 Story House  --    Steps Into Home 2  --    Steps In Home 0  --    Rail None  --    Bathroom Set up Walk In Shower;Shower Glass Doors;Tub Transfer Bench  --    Equipment Owned Front-Wheel Walker;4-Wheel Walker;Tub Transfer Bench  --    Lives with - Patient's Self Care Capacity Spouse;Child Less than 18 Years of Age  --    Comments Wife works full time at [employer] and would be unavailable upon DC home. Pt has 2 small children (5yo and 8yo) he is primary caregiver to.  --    Prior Level of ADL Function   Self Feeding Independent  --    Grooming / Hygiene Independent  --    Bathing Independent  --    Dressing Independent  --    Toileting Independent  --    Prior Level of IADL Function   Medication Management Independent  --    Laundry Independent  --    Kitchen Mobility Independent  --    Finances Independent  --    Home Management Independent  --    Shopping Independent  --    Prior Level Of Mobility Independent With Device in Community;Independent With Device in Home;Independent With Steps in Community;Independent With Steps in Home  --    Driving / Transportation Driving Independent  --    Occupation (Pre-Hospital Vocational) Homemaker  --    Leisure Interests Family  --    Prior  "Functioning: Everyday Activities   Self Care Independent  --    Indoor Mobility (Ambulation) Independent  --    Stairs Independent  --    Functional Cognition Independent  --    Prior Device Use Walker  --    Pain   Intervention Medication (see MAR);Emotional Support;Repositioned;Rest;Nurse Notified  --    Pain 0 - 10 Group   Location Wrist;Shoulder;Leg  (Left shoulder, right wrist, left residual limb)  --    Location Orientation Right;Left  --    Pain Rating Scale (NPRS) 9  (Progressing to 10/10)  --    Description Aching;Shooting  --    Comfort Goal Perform Activity;Comfort with Movement  --    Therapist Pain Assessment During Activity;Post Activity;Nurse Notified  --    Cognition    Cognition / Consciousness WDL  --    Level of Consciousness Alert  --    Comments A&Ox3 (off by one day) pleasant and motivated to participate. Limited with attention by increased pain.  --    ABS (Agitated Behavior Scale)   Agitated Behavior Scale Performed No  --    Cognitive Pattern Assessment   Cognitive Pattern Assessment Used BIMS  --    Brief Interview for Mental Status (BIMS)   Repetition of Three Words (First Attempt) 3  --    Temporal Orientation: Year Correct  --    Temporal Orientation: Month Accurate within 5 days  --    Temporal Orientation: Day Incorrect  --    Recall: \"Sock\" Yes, no cue required  --    Recall: \"Blue\" Yes, no cue required  --    Recall: \"Bed\" Yes, no cue required  --    BIMS Summary Score 14  --    Confusion Assessment Method (CAM)   Is there evidence of an acute change in mental status from the patient's baseline? No  --    Inattention Behavior present, fluctuates (comes and goes, changes in severity)  --    Disorganized thinking Behavior not present  --    Altered level of consciousness Behavior not present  --    Vision Screen   Vision Not tested  (Pt reports no changes in vision or use of corrective lens, no overt deficits noted in function)  --    Passive ROM Upper Body   Passive ROM Upper Body Not " Tested X   Comments  --  Pt limited with Left shoulder flexion/abduction to 3/4 range due to pain. Did not assess past pain range.  All other ranges WFL   Active ROM Upper Body   Active ROM Upper Body  Not Tested X   Dominant Hand Right Right   Comments  --  Pt limited with Left shoulder flexion/abduction to 3/4 range due to pain. Did not assess past pain range. All other ranges WFL   Strength Upper Body   Upper Body Strength  Not Tested X   Comments  --  Pt grossly assessed with pt limited at all ranges by RA pain to 3-3+/5 bilaterally. Thought pt is able to participate in SB transfers with more than 3+/5 strength with pain.   Sensation Upper Body   Upper Extremity Sensation  Not Tested WDL   Comments  --  BUES   Upper Body Muscle Tone   Upper Body Muscle Tone  WDL  --    Comments BUES  --    Balance Assessment   Sitting Balance (Static) Fair +  --    Sitting Balance (Dynamic) Fair -  --    Weight Shift Sitting Fair  --    Comments As limited by shoulder and wrist pain during SB transfers and in function.  --    Bed Mobility    Supine to Sit Standby Assist  --    Scooting Moderate Assist  --    Coordination Upper Body   Coordination X  --    Fine Motor Coordination R-wrist motion limited by pain.  --    Gross Motor Coordination L-shoulder motion limited by pain.  --    Eating   Assistance Needed Independent Independent   CARE Score - Eating 6 6   Eating Discharge Goal   Discharge Goal 6 6   Oral Hygiene   Assistance Needed  --  Set-up / clean-up   Reason if not Attempted Environmental limitations  --    CARE Score - Oral Hygiene 10 5   Oral Hygiene Discharge Goal   Discharge Goal 6 6   Shower/Bathe Self   Assistance Needed Physical assistance  --    Physical Assistance Level 25% or less  --    CARE Score - Shower/Bathe Self 3  --    Shower/Bathe Self Discharge Goal   Discharge Goal 5  --    Upper Body Dressing   Assistance Needed Supervision  --    CARE Score - Upper Body Dressing 4  --    Upper Body Dressing  Discharge Goal   Discharge Goal 6  --    Lower Body Dressing   Assistance Needed Physical assistance  --    Physical Assistance Level 76% or more  --    CARE Score - Lower Body Dressing 2  --    Lower Body Dressing Discharge Goal   Discharge Goal 4  --    Putting On/Taking Off Footwear   Assistance Needed Physical assistance  --    Physical Assistance Level Total assistance  --    CARE Score - Putting On/Taking Off Footwear 1  --    Putting On/Taking Off Footwear Discharge Goal   Discharge Goal 4  --    Toileting Hygiene   Assistance Needed  --  Physical assistance   Physical Assistance Level  --  Total assistance   Reason if not Attempted Safety concerns  --    CARE Score - Toileting Hygiene 88 1   Toileting Hygiene Discharge Goal   Discharge Goal 4 4   Toilet Transfer   Assistance Needed  --  Physical assistance   Physical Assistance Level  --  51%-75%   Reason if not Attempted Safety concerns  --    CARE Score - Toilet Transfer 88 2   Toilet Transfer Discharge Goal   Discharge Goal 4 4   Hearing, Speech, and Vision   Ability to Hear Adequate  --    Ability to See in Adequate Light Adequate  --    Expression of Ideas and Wants Without difficulty  --    Understanding Verbal and Non-Verbal Content Understands  --    Functional Level of Assist   Eating Independent  --    Grooming  --  Supervision   Grooming Description  --  Set-up of equipment;Supervision for safety  (for oral care, seated in bed due to increased pain.)   Bathing Minimal Assist  --    Bathing Description Grab bar;Hand held shower;Tub bench;Assit with back;Assit wtih lower extremities;Assit with perineal;Increased time;Initial preparation for task;Set up for wound protection;Set-up of equipment;Verbal cueing;Supervision for safety  (Assist with waterproofing of R-foot, L-limb, and R-hand for IV. OTR assisted with washing rear, R-knee to ankle, and back. Lateral leaning for rear. Pt able to reach all other parts with increased time.)  --    Upper Body  Dressing Supervision  --    Upper Body Dressing Description Set-up of equipment;Supervision for safety  (to don pullover shirt, increased time due to L-shoulder pain.)  --    Lower Body Dressing Maximal Assist  --    Lower Body Dressing Description Assist with threading into pant leg;Assist with closures;Increased time;Set-up of equipment;Initial preparation for task;Supervision for safety;Verbal cueing  (Pt able to thread shorts when donning, but required total A with draw up with bilateral leaning while seated in w/c and again with don/doff of socks.)  --    Toileting Total Assist  --    Toileting Description Assist for hygiene;Assist to pull pants down;Assist to pull pants up;Increased time;Initial preparation for task;Set-up of equipment;Supervision for safety;Verbal cueing  (Assessed using clinical reasoning skills r/t performance of bathing and LB dressing. No need to void.)  --    Bed, Chair, Wheelchair Transfer Moderate Assist  --    Bed Chair Wheelchair Transfer Description Slideboard transfer from bed to wheelchair;Increased time;Adaptive equipment;Initial preparation for task;Set-up of equipment;Supervision for safety;Verbal cueing  (To the R-side)  --    Toilet Transfers  --  Moderate Assist   Toilet Transfer Description  --  Adaptive equipment;Increased time;Set-up of equipment;Supervision for safety;Verbal cueing  (Lateral scoot to platform droparm bedside commode over even surface with gait belt. Pt limited by pain.)   Tub / Shower Transfers Total Assist X 2  --    Tub Shower Transfer Description Adaptive equipment;Shower bench;Increased time;Initial preparation for task;Supervision for safety;Verbal cueing;Set-up of equipment  (Mod A x1 to R-side downhill SB, Mod A x1 with CGA x1 for w/c stabilization uphill SB)  --    Comprehension Independent  --    Expression Independent  --    Problem Solving Contact Guard Assist  --    Memory Independent  --    Problem List   Problem List Decreased Active Daily  Living Skills;Decreased Homemaking Skills;Decreased Upper Extremity Strength Left;Decreased Upper Extremity Strength Right;Decreased Upper Extremity AROM Left;Decreased Upper Extremity PROM Left;Decreased Functional Mobility;Safety Awareness Deficits / Cognition;Decreased Activity Tolerance;Impaired Posture / Trunk Alignment;Impaired Postural Control / Balance;Limited Knowledge of Post Op Precautions  --    Precautions   Precautions Non Weight Bearing Left Lower Extremity;Weight Bearing As Tolerated Right Lower Extremity;Fall Risk  --    Comments SB transfer, L-BKA, R-foot wound, RA  --    Current Discharge Plan   Current Discharge Plan Return to Prior Living Situation  --    Benefit    Therapy Benefit Patient Would Benefit from Inpatient Rehab Occupational Therapy to Maximize [place] with ADLs, IADLs and Functional Mobility.  --    Interdisciplinary Plan of Care Collaboration   IDT Collaboration with  Nursing;Physical Therapist;Therapy Tech  --    Patient Position at End of Therapy Seated;Chair Alarm On;Self Releasing Lap Belt Applied;Call Light within Reach;Tray Table within Reach In Bed;Bed Alarm On;Call Light within Reach;Tray Table within Reach;Phone within Reach   Collaboration Comments CLOF, transfer status, pain  --    Equipment Needs   Assistive Device / DME Front-Wheel Walker;Parallel Bars;Wheelchair;Slideboard;Tub Transfer Bench;Bedside Commode;Grab Bars In Shower / Tub;Grab Bars By Toilet  --    Adaptive Equipment Reacher;Sock Aide;Dressing Stick;Long Handled Shoe Horn;Elastic Shoe Laces;Long Handled Sponge  --    Strengths & Barriers   Strengths Able to follow instructions;Alert and oriented;Effective communication skills;Good insight into deficits/needs;Independent prior level of function;Motivated for self care and independence;Pleasant and cooperative;Supportive family;Willingly participates in therapeutic activities  --    Barriers Decreased endurance;Fatigue;Generalized weakness;Home  accessibility;Impaired activity tolerance;Impaired balance;Limited mobility;Pain;Pain poorly managed  --        Patient education: reviewed OT plan of care, rehab expectations, goal setting, ADL needs, orientation to schedule, role of OT in recovery, fall risk and use of call light. Also discussed WB precautions and SB transfers..     Assessment  Patient is 47 y.o. Male with a diagnosis of left BKA.  Additional factors influencing patient status / progress (ie: cognitive factors, co-morbidities, social support, etc): Per Dr. Pisano H&P - Pt with a past medical history of reported arthritis on Humira with deformities and foot ulcers;  who presented on 2/28/2023 10:51 AM with MRI evidence of osteomyelitis of the left navicular bone fever of 103, hypoglycemia, altered mental status and cellulitis.  Patient was seen by orthopedic surgery, LPS and infectious disease.  He is currently on cefazolin 3 times daily through 3/12/2023.  Patient went marked left knee through foot yesterday which found tenosynovitis. Chart review reveals that Mr. Hubbard is a prior patient of [facility], with his most recent admission in July 2020, where he attended rehab following right knee replacement and was successfully discharged home after 11 days. Patient was taken to the OR on 3/9 for L BKA performed by Dr. Chambers. Blood cultures have been + for strep A. Patient has been on IV abx, but will be switched to augementin.  Patient's hospital stay has been complicated by difficult to control pain control and has been changed to long acting morphine, morphine IR PRN and Lyrica. .     OT evaluation performed today; functional performance at today's assessment is as above. During evaluation and tx pt was significantly limited by pain in L-shoulder and R-wrist during functional transfers, requiring medication administration from nursing. Pt may benefit from set therapy schedule and scheduled pain medications to maximize  participation. Pt presents to rehab below his normal baseline Ind level, currently functioning between Min A-Total A for ADLs, and Mod A-Total A x2 for SB  for functional transfers. Pt is limited by decreased strength, decreased balance, decreased endurance, pain, problem solving, limited knowledge of post-op precautions, limited PROM, limited AROM, and decreased LB strength and RA pain. Pt lives in a HOUSING FACILITY: 1 Story House with 2STE and wife who can assist in the evenings only. Pt will benefit from ongoing care from this skilled interdisciplinary high intensity rehabilitation program to address the aforementioned deficits in order to maximize ind with ADLs, IADLs, and household/community mobility while reducing burden of care, supporting a safe return home upon DC.      Plan  Recommend Occupational Therapy  minutes per day 5-7 days per week for 2-3 weeks for the following treatments:  OT Orthotics Training, OT Self Care/ADL, OT Community Reintegration, OT Manual Ther Technique, OT Neuro Re-Ed/Balance, OT Sensory Int Techniques, OT Therapeutic Activity, OT Evaluation, and OT Therapeutic Exercise.    Passport items to be completed:  Perform bathroom transfers, complete dressing, complete feeding, get ready for the day, prepare a simple meal, participate in household tasks, adapt home for safety needs, demonstrate home exercise program, complete caregiver training     Goals:  Long term and short term goals have been discussed with patient and they are in agreement.    Occupational Therapy Goals (Active)       Problem: Bathing       Dates: Start: 03/22/23         Goal: STG-Within one week, patient will bathe at CGA level using DME/AE PRN.       Dates: Start: 03/22/23               Problem: Dressing       Dates: Start: 03/22/23         Goal: STG-Within one week, patient will dress LB at Mod A level using DME/AE PRN.       Dates: Start: 03/22/23               Problem: Functional Transfers       Dates:  Start: 03/22/23         Goal: STG-Within one week, patient will transfer to toilet       Dates: Start: 03/22/23               Problem: OT Long Term Goals       Dates: Start: 03/22/23         Goal: LTG-By discharge, patient will complete basic self care tasks at Min A-Mod I level using DME/AE PRN.       Dates: Start: 03/22/23            Goal: LTG-By discharge, patient will perform bathroom transfers at SBA-Mod I level using DME/AE PRN.       Dates: Start: 03/22/23            Goal: LTG-By discharge, patient will complete basic home management at Min A-Mod I level using DME/AE PRN.       Dates: Start: 03/22/23               Problem: Toileting       Dates: Start: 03/22/23         Goal: STG-Within one week, patient will complete toileting tasks        Dates: Start: 03/22/23

## 2023-03-23 NOTE — THERAPY
Physical Therapy   Daily Treatment     Patient Name: Richard Hubbard II  Age:  47 y.o., Sex:  male  Medical Record #: [id number]  Today's Date: 3/23/2023     Precautions  Precautions: (P) Non Weight Bearing Left Lower Extremity, Weight Bearing As Tolerated Right Lower Extremity  Comments: (P) L BKA, R foot wound, RA - L shoulder and R wrist exacerbation    Subjective    Patient in bed with cafeteria staff present, agreeable to PT session.      Objective       03/23/23 1101   Precautions   Precautions Non Weight Bearing Left Lower Extremity;Weight Bearing As Tolerated Right Lower Extremity   Comments L BKA, R foot wound, RA - L shoulder and R wrist exacerbation   Transfer Functional Level of Assist   Bed, Chair, Wheelchair Transfer Minimal Assist  (vs CGA)   Bed Chair Wheelchair Transfer Description Increased time;Slideboard transfer from bed to wheelchair;Initial preparation for task;Supervision for safety;Verbal cueing   Bed Mobility    Supine to Sit Minimal Assist   Sit to Supine Contact Guard Assist   Scooting Contact Guard Assist   Rolling Contact Guard Assist  (Requires cues for sequencing)   Interdisciplinary Plan of Care Collaboration   IDT Collaboration with  Other (See Comments)  (Nutrition consult at beginning of session)   Patient Position at End of Therapy Seated;Chair Alarm On;Self Releasing Lap Belt Applied;Call Light within Reach;Tray Table within Reach;Phone within Reach   Collaboration Comments POC     Sustained prone position on therapy mat 20 minutes while performing Therex as below:    - Glute sets 2x 10 with 5 second hold   - R/L knee flex/ext AROM - L 2X10, R 1X10   - R knee resisted knee flexion - blue band 1x10     Education provided on contracture prevention and importance of laying prone for 20-30 minutes per day.     Assessment    Patient tolerated treatment well. Continues to progress slide board transfer efficiency with each session. Educated patient on contracture prevention while  performing. Reviewed bed mobility mechanics to decrease L shoulder discomfort.     Strengths: Able to follow instructions, Effective communication skills, Good carryover of learning, Good insight into deficits/needs, Independent prior level of function, Making steady progress towards goals, Motivated for self care and independence, Pleasant and cooperative, Supportive family, Willingly participates in therapeutic activities  Barriers: Fatigue, Generalized weakness, Home accessibility, Impaired activity tolerance, Impaired balance, Limited mobility, Pain poorly managed, Pressure ulcer    Plan    BLE strengthening, prone lying 20-30minutes per day, HEP for LE strengthening, bed mobility, transfers slide board vs squat pivot, standing tolerance/balance with LRAD, w/c mobility/ramp negotiation, problem solve home entry (2 steps), contracture prevention, residual limb desensitization, residual limb wrapping, Prosthetic and amputee education     Passport items to be completed:  Get in/out of bed safely, in/out of a vehicle, safely use mobility device, walk or wheel around home/community, navigate up and down stairs, show how to get up/down from the ground, ensure home is accessible, demonstrate HEP, complete caregiver training    Physical Therapy Problems (Active)       Problem: Balance       Dates: Start: 03/22/23         Goal: STG-Within one week, patient will tolerate AMPnoPRO assessment.        Dates: Start: 03/22/23               Problem: Mobility       Dates: Start: 03/22/23         Goal: STG-Within one week, patient will propel wheelchair up incline/ramp with CGA.       Dates: Start: 03/22/23               Problem: Mobility Transfers       Dates: Start: 03/22/23         Goal: STG-Within one week, patient will perform bed mobility with SBA.       Dates: Start: 03/22/23            Goal: STG-Within one week, patient will transfer bed to chair with CGA and slide board vs squat pivot.       Dates: Start: 03/22/23                Problem: PT-Long Term Goals       Dates: Start: 03/22/23         Goal: LTG-By discharge, patient will propel wheelchair household distances with Mariano.        Dates: Start: 03/22/23            Goal: LTG-By discharge, patient will perform home exercise program independently.        Dates: Start: 03/22/23            Goal: LTG-By discharge, patient will transfer in/out of a car with supervision.        Dates: Start: 03/22/23            Goal: LTG-By discharge, patient will complete bed mobility with Mariano.        Dates: Start: 03/22/23            Goal: LTG-By discharge, patient will complete w/c to bed transfer using squat pivot vs slide board Mariano.        Dates: Start: 03/22/23

## 2023-03-23 NOTE — PROGRESS NOTES
Received shift report and assumed care of patient.  Patient awake, calm and stable, currently positioned in bed for comfort and safety; call light within reach.  5/10 left shoulder and leg pain at this time.   See mar for medication Will continue to monitor.

## 2023-03-23 NOTE — THERAPY
Occupational Therapy  Daily Treatment     Patient Name: Richard Hubbard II  Age:  47 y.o., Sex:  male  Medical Record #: [id number]  Today's Date: 3/23/2023     Precautions  Precautions: Non Weight Bearing Left Lower Extremity, Weight Bearing As Tolerated Right Lower Extremity  Comments: L BKA, R foot wound, RA - L shoulder and R wrist exacerbation         Subjective    Pt seen 2x this day and agreeable to both sessions. Initial session pt reporting missing his family during this admission. Dr. Pisano informed for follow up. OTR utilized active listening and therapeutic use of self to sooth pt.      Objective       03/23/23 0831 03/23/23 1231   OT Charge Group   OT Self Care / ADL (Units) 1  --    OT Therapy Activity (Units) 1 1   OT Therapeutic Exercise (Units)  --  3   OT Total Time Spent   OT Individual Total Time Spent (Mins) 30 60   Pain   Intervention  --  Emotional Support   Pain 0 - 10 Group   Location Shoulder;Wrist Shoulder;Wrist   Location Orientation Left;Right Left;Right   Pain Rating Scale (NPRS) 9 9   Description Aching Aching;Sharp   Comfort Goal Comfort with Movement;Perform Activity Perform Activity;Comfort with Movement   Therapist Pain Assessment Post Activity Pain Same as Prior to Activity;Nurse Notified Post Activity Pain Same as Prior to Activity   Functional Level of Assist   Bed, Chair, Wheelchair Transfer  --  Contact Guard Assist   Bed Chair Wheelchair Transfer Description  --  Increased time;Slideboard transfer from bed to wheelchair;Initial preparation for task;Supervision for safety;Verbal cueing;Set-up of equipment  (EOB>w/c)   Sitting Upper Body Exercises   Sitting Upper Body Exercises  --  Yes   Chest Press  --  3 sets of 10;Bilateral;Weight (See Comments for lbs)  (10lbs Equilizer)   Bicep Curls  --  Bilateral;Weight (See Comments for lbs)  (1 set of 5 reps using 3lb hand weights, as limited by pain.)   Tricep Press  --  3 sets of 10;Bilateral;Weight (See Comments for  "lbs)  (Rickshaw forward and back at 15lbs)   Interdisciplinary Plan of Care Collaboration   IDT Collaboration with   --  Therapy Tech   Patient Position at End of Therapy Seated;Chair Alarm On;Self Releasing Lap Belt Applied;Call Light within Reach;Tray Table within Reach Seated;Chair Alarm On;Call Light within Reach;Tray Table within Reach   Collaboration Comments  --  Assisted with fixing w/c arm.     0830 - OTR educated pt on the [facility] program. OTR explained each discipline briefly and then explained occupational therapy's role in more detail. OTR answered questions pt had about pt's stay and educated pt on expectations at rehab. Discussed and began to attempt drop arm commode transfer over toilet, however, pt unable to achieve enough lift to clear uphill transfer due to weakness. Pt also completed seated grooming.     1230 - Added elastic shoe laces to R-shoe with pt able to don with LHSH.    Assessment    Pt with fair tolerance to OT tx this day, with several breaks due to pain r/t RA. Pt reported that he has trialed \"everything\" topically to combat this pain over the years, and to no avail. Pt pushes himself to complete as much as possible, as he states he is motivated to get home to his family.  Strengths: Able to follow instructions, Alert and oriented, Effective communication skills, Good insight into deficits/needs, Independent prior level of function, Motivated for self care and independence, Pleasant and cooperative, Supportive family, Willingly participates in therapeutic activities  Barriers: Decreased endurance, Fatigue, Generalized weakness, Home accessibility, Impaired activity tolerance, Impaired balance, Limited mobility, Pain, Pain poorly managed    Plan    BUE strengthening, functional transfers, trial AE for LB dressing.    Occupational Therapy Goals (Active)       Problem: Bathing       Dates: Start: 03/22/23         Goal: STG-Within one week, patient will bathe at CGA level " using DME/AE PRN.       Dates: Start: 03/22/23               Problem: Dressing       Dates: Start: 03/22/23         Goal: STG-Within one week, patient will dress LB at Mod A level using DME/AE PRN.       Dates: Start: 03/22/23               Problem: Functional Transfers       Dates: Start: 03/22/23         Goal: STG-Within one week, patient will transfer to toilet at Min A level using DME/AE PRN.       Dates: Start: 03/22/23               Problem: OT Long Term Goals       Dates: Start: 03/22/23         Goal: LTG-By discharge, patient will complete basic self care tasks at Min A-Mod I level using DME/AE PRN.       Dates: Start: 03/22/23            Goal: LTG-By discharge, patient will perform bathroom transfers at SBA-Mod I level using DME/AE PRN.       Dates: Start: 03/22/23            Goal: LTG-By discharge, patient will complete basic home management at Min A-Mod I level using DME/AE PRN.       Dates: Start: 03/22/23               Problem: Toileting       Dates: Start: 03/22/23         Goal: STG-Within one week, patient will complete toileting tasks at Max A level using DME/AE PRN.       Dates: Start: 03/22/23

## 2023-03-23 NOTE — DISCHARGE PLANNING
Case Management/IDT follow up.   IDT continues to recommend IRF level of care as patient continue to make progress with all therapies.   Projected dc date set for 4/11      DC needs:  Recommendations made for home health for PT/OT/  Follow up with PCP /ORTHO  Dr Lozoya consulted today; he is receptive to follow up with her if possible; he reports his wife who is a pediatric APRN @ [employer] will pursue FMLA;   Met with pt discussing above    Plan:  Continue to follow

## 2023-03-23 NOTE — PROGRESS NOTES
"  Physical Medicine & Rehabilitation Progress Note    Encounter Date: 3/23/2023    Chief Complaint: Decreased mobility, leg pain    Interval Events (Subjective):  Patient sitting up in room. He reports he is doing much better today. He reports pain is controlled better with scheduled morphine. He denies NVD. Discussed would have first IDT later today.     _____________________________________  Interdisciplinary Team Conference   Most recent IDT on 3/23/2023    IKeaton M.D./Ph.D., was present and led the interdisciplinary team conference on 3/23/2023.  I led the IDT conference and agree with the IDT conference documentation and plan of care as noted below.     Nursing:  Diet Current Diet Order   Procedures    Diet Order Diet: Regular       Eating ADL Independent      % of Last Meal  Oral Nutrition: Breakfast, Between % Consumed   Sleep    Bowel Last BM: 03/19/23 - continent   Bladder Continent   Barriers to Discharge Home:  Pain - severe    Physical Therapy:  Bed Mobility    Transfers Minimal Assist  Increased time, Initial preparation for task, Slideboard transfer from bed to wheelchair, Supervision for safety, Verbal cueing   Mobility Unable to Participate   Stairs    Barriers to Discharge Home:  Pain - severe  Needs setup  Shoulder, wrist, knee weakness  Limited strength and standing tolerance    Occupational Therapy:  Grooming Supervision   Bathing Minimal Assist   UB Dressing Supervision   LB Dressing Maximal Assist   Toileting Total Assist   Shower & Transfer    Barriers to Discharge Home:  Left shoulder and wrist pain  Limited by pain  Weakness for lifting himself off    Tub transfer    Rec Therapy  Evaluation pending    Case Management:  Continues to work on disposition and DME needs.      Discharge Date/Disposition:  4/11/23  _____________________________________      Objective:  VITAL SIGNS: /82   Pulse 71   Temp 36.6 °C (97.9 °F) (Oral)   Resp 18   Ht 1.8 m (5' 10.87\")  "  Wt 95.8 kg (211 lb 3.2 oz)   SpO2 99%   BMI 29.57 kg/m²   Gen: NAD  Psych: Mood and affect appropriate  CV: RRR, 1+ edema  Resp: CTAB, no upper airway sounds  Abd: NTND  Neuro: AOx4, following commands  Unchanged from 3/22/23    Laboratory Values:  Recent Results (from the past 72 hour(s))   POCT glucose device results    Collection Time: 03/20/23 12:02 PM   Result Value Ref Range    POC Glucose, Blood 91 65 - 99 mg/dL   POCT glucose device results    Collection Time: 03/20/23  5:42 PM   Result Value Ref Range    POC Glucose, Blood 87 65 - 99 mg/dL   POCT glucose device results    Collection Time: 03/21/23  8:10 AM   Result Value Ref Range    POC Glucose, Blood 67 65 - 99 mg/dL   COV-2, FLU A/B, AND RSV BY PCR (2-4 HOURS CEPHEID): Collect NP swab in VTM    Collection Time: 03/21/23 12:15 PM    Specimen: Respirate   Result Value Ref Range    Influenza virus A RNA Negative Negative    Influenza virus B, PCR Negative Negative    RSV, PCR Negative Negative    SARS-CoV-2 by PCR NotDetected     SARS-CoV-2 Source NP Swab    POCT glucose device results    Collection Time: 03/21/23  5:07 PM   Result Value Ref Range    POC Glucose, Blood 97 65 - 99 mg/dL   CBC with Differential    Collection Time: 03/22/23  5:26 AM   Result Value Ref Range    WBC 6.6 4.8 - 10.8 K/uL    RBC 3.16 (L) 4.70 - 6.10 M/uL    Hemoglobin 9.0 (L) 14.0 - 18.0 g/dL    Hematocrit 29.3 (L) 42.0 - 52.0 %    MCV 92.7 81.4 - 97.8 fL    MCH 28.5 27.0 - 33.0 pg    MCHC 30.7 (L) 33.7 - 35.3 g/dL    RDW 50.2 (H) 35.9 - 50.0 fL    Platelet Count 345 164 - 446 K/uL    MPV 8.5 (L) 9.0 - 12.9 fL    Neutrophils-Polys 53.40 44.00 - 72.00 %    Lymphocytes 33.70 22.00 - 41.00 %    Monocytes 7.90 0.00 - 13.40 %    Eosinophils 2.70 0.00 - 6.90 %    Basophils 1.20 0.00 - 1.80 %    Immature Granulocytes 1.10 (H) 0.00 - 0.90 %    Nucleated RBC 0.00 /100 WBC    Neutrophils (Absolute) 3.54 1.82 - 7.42 K/uL    Lymphs (Absolute) 2.23 1.00 - 4.80 K/uL    Monos (Absolute) 0.52  0.00 - 0.85 K/uL    Eos (Absolute) 0.18 0.00 - 0.51 K/uL    Baso (Absolute) 0.08 0.00 - 0.12 K/uL    Immature Granulocytes (abs) 0.07 0.00 - 0.11 K/uL    NRBC (Absolute) 0.00 K/uL   Comp Metabolic Panel (CMP)    Collection Time: 03/22/23  5:26 AM   Result Value Ref Range    Sodium 132 (L) 135 - 145 mmol/L    Potassium 4.2 3.6 - 5.5 mmol/L    Chloride 99 96 - 112 mmol/L    Co2 26 20 - 33 mmol/L    Anion Gap 7.0 7.0 - 16.0    Glucose 68 65 - 99 mg/dL    Bun 6 (L) 8 - 22 mg/dL    Creatinine 0.42 (L) 0.50 - 1.40 mg/dL    Calcium 8.3 (L) 8.5 - 10.5 mg/dL    AST(SGOT) 32 12 - 45 U/L    ALT(SGPT) 23 2 - 50 U/L    Alkaline Phosphatase 403 (H) 30 - 99 U/L    Total Bilirubin 0.3 0.1 - 1.5 mg/dL    Albumin 2.2 (L) 3.2 - 4.9 g/dL    Total Protein 7.8 6.0 - 8.2 g/dL    Globulin 5.6 (H) 1.9 - 3.5 g/dL    A-G Ratio 0.4 g/dL   HEMOGLOBIN A1C    Collection Time: 03/22/23  5:26 AM   Result Value Ref Range    Glycohemoglobin 5.0 4.0 - 5.6 %    Est Avg Glucose 97 mg/dL   TSH with Reflex to FT4    Collection Time: 03/22/23  5:26 AM   Result Value Ref Range    TSH 2.940 0.380 - 5.330 uIU/mL   Vitamin D, 25-hydroxy (blood)    Collection Time: 03/22/23  5:26 AM   Result Value Ref Range    25-Hydroxy   Vitamin D 25 11 (L) 30 - 100 ng/mL   ESTIMATED GFR    Collection Time: 03/22/23  5:26 AM   Result Value Ref Range    GFR (CKD-EPI) 134 >60 mL/min/1.73 m 2   CORRECTED CALCIUM    Collection Time: 03/22/23  5:26 AM   Result Value Ref Range    Correct Calcium 9.7 8.5 - 10.5 mg/dL   POCT glucose device results    Collection Time: 03/22/23  8:31 AM   Result Value Ref Range    POC Glucose, Blood 82 65 - 99 mg/dL       Medications:  Scheduled Medications   Medication Dose Frequency    nicotine  7 mg Daily-0600    multivitamin  1 Tablet DAILY    morphine  30 mg TID    senna-docusate  2 Tablet BID    acetaminophen  650 mg Q6HRS    calcium citrate  950 mg DAILY    DULoxetine  60 mg DAILY    morphine ER  30 mg Q12HRS    omeprazole  40 mg DAILY     pregabalin  150 mg TID    thiamine  100 mg DAILY    [START ON 3/28/2023] vitamin D2 (Ergocalciferol)  50,000 Units Q7 DAYS     PRN medications: Respiratory Therapy Consult, hydrALAZINE, acetaminophen, senna-docusate **AND** polyethylene glycol/lytes **AND** magnesium hydroxide **AND** bisacodyl, mag hydrox-al hydrox-simeth, ondansetron **OR** ondansetron, traZODone, sodium chloride, carboxymethylcellulose, methocarbamol, mirtazapine, morphine, zolpidem    Diet:  Current Diet Order   Procedures    Diet Order Diet: Regular       Medical Decision Making and Plan:   L BKA - Patient with osteomyelitis while on DMARD for RA with multiple foot wounds. Patient is s/p left BKA on 3/9/23 with Dr. Chambers. NWB LLE. Needs IPOP vs extension brace.   -PT and OT for mobility and ADLs. Per guidelines, 15 hours per week between PT, OT and/or SLP.  -Follow-up Dr. Reyes CARVAJAL - Previously on medication. Currently on hold.      Tobacco Abuse - Patient on nicotine patch. Will need counseling     Anemia - Check AM CBC - 9.0 on admission, will monitor      Hyponatremia - Check AM CMP - 132, will continue to monitor     Hypocalcemia - Check AM CMP - 8.3 on admission     Morbid obesity due to excess calories - BMI of 35.3 on admission, meets medical criteria. Dietitian to consult     Neuropathy - Patient on Lyrica TID     Pain - Patient on Morphine ER, Morphine IR PRN, Tylenol, and Lyrica  -Schedule Morphine , 1130, and 1500. Improved pain control, continue schedule Morphine and Lyrica     Skin - Patient at risk for skin breakdown due to debility in areas including sacrum, achilles, elbows and head in addition to other sites. Nursing to assess skin daily.      GI Ppx - Patient on Prilosec for GERD prophylaxis. Patient on Senna-docusate for constipation prophylaxis.   Last BM: 03/19/23     DVT Ppx - Patient refusing AC on transfer. Patient reports he is aware of risks.  ____________________________________    T. Loi Pisano  MD./PhD.  [facility] - Physical Medicine & Rehabilitation   [facility] - Brain Injury Medicine   ____________________________________    Total time:  51 minutes. Time spent included pre-rounding review of vitals and tests, unit/floor time, face-to-face time with the patient including physical examination, care coordination, counseling of patient and/or family, ordering medications/procedures/tests, discussion with CM, PT, OT, SLP and/or other healthcare providers, and documentation in the electronic medical record. Topics discussed included discharge planning, pain control, continue morphine, and monitor for constipation. Patient was discussed separately in IDT today; please see details above.

## 2023-03-23 NOTE — THERAPY
Physical Therapy   Daily Treatment     Patient Name: Richard Hubbard II  Age:  47 y.o., Sex:  male  Medical Record #: [id number]  Today's Date: 3/23/2023     Precautions  Precautions: Non Weight Bearing Left Lower Extremity, Weight Bearing As Tolerated Right Lower Extremity  Comments: L BKA, R foot wound, RA - L shoulder and R wrist exacerbation    Subjective    Patient seated in w/c, agreeable to therapy.      Objective       03/23/23 0701   PT Charge Group   PT Therapeutic Activities (Units) 4   PT Total Time Spent   PT Individual Total Time Spent (Mins) 60   Precautions   Precautions Non Weight Bearing Left Lower Extremity;Weight Bearing As Tolerated Right Lower Extremity   Comments L BKA, R foot wound, RA - L shoulder and R wrist exacerbation   Pain 0 - 10 Group   Location Shoulder;Wrist  (L/R)   Wheelchair Functional Level of Assist   Wheelchair Assist Moderate Assist   Distance Wheelchair (Feet or Distance) 20   Wheelchair Description Extra time;Assistance with steering;Supervision for safety;Verbal cueing  (limited by L shoulder and R wrist pain, R knee and RA)   Transfer Functional Level of Assist   Bed, Chair, Wheelchair Transfer Minimal Assist   Bed Chair Wheelchair Transfer Description Increased time;Initial preparation for task;Slideboard transfer from bed to wheelchair;Supervision for safety;Verbal cueing   Bed Mobility    Supine to Sit Minimal Assist   Sit to Supine Contact Guard Assist   Sit to Stand Minimal Assist  (from elevated therapy table with FWW)   Rolling Contact Guard Assist   Interdisciplinary Plan of Care Collaboration   IDT Collaboration with  Occupational Therapist;Nursing   Patient Position at End of Therapy Seated;Chair Alarm On;Self Releasing Lap Belt Applied;Call Light within Reach;Tray Table within Reach;Phone within Reach   Collaboration Comments Transfer status, CLOF, pain     AMPnoPRO: Scored 9/39              - Required elevated table to perform sit to stand, using FWW for  BUE support           Seated <> Prone practice on therapy mat in preparation for tummy time: assistance required to lift RLE onto therapy mat, verbal and tactile cues for sequencing of log roll. Verbal cues for BUE placement to decrease shoulder discomfort. Required rest breaks in side lying, supine, and prone. Benefits from pillow positioning to support excessive thoracic kyphosis and and cervical extension. Total time 2 minutes, limited by time constraints. Total time 2 minutes, limited by time constraints.     Assessment    Patient tolerated treatment well. Performed the Amputee Mobility Predictor Assessment Tool, scoring 9/39, currently at a K1 classification and indicating poor mobility and standing tolerance. However, patient displays potential for improvement in score with pain management. Patient currently unable to arise from standard chair height to hop; limited by R foot deformity and associated RA pain. Fair to good bed mobility, requiring cues for technique to optimize performance and comfort.      Strengths: Able to follow instructions, Effective communication skills, Good carryover of learning, Good insight into deficits/needs, Independent prior level of function, Making steady progress towards goals, Motivated for self care and independence, Pleasant and cooperative, Supportive family, Willingly participates in therapeutic activities  Barriers: Fatigue, Generalized weakness, Home accessibility, Impaired activity tolerance, Impaired balance, Limited mobility, Pain poorly managed, Pressure ulcer    Plan    BLE strengthening, prone lying 20-30minutes per day, HEP for LE strengthening, bed mobility, transfers slide board vs squat pivot, standing tolerance/balance with LRAD, w/c mobility/ramp negotiation, problem solve home entry (2 steps), contracture prevention, residual limb desensitization, residual limb wrapping, Prosthetic and amputee education     Passport items to be completed:  Get in/out of bed  safely, in/out of a vehicle, safely use mobility device, walk or wheel around home/community, navigate up and down stairs, show how to get up/down from the ground, ensure home is accessible, demonstrate HEP, complete caregiver training    Physical Therapy Problems (Active)       Problem: Balance       Dates: Start: 03/22/23         Goal: STG-Within one week, patient will tolerate AMPnoPRO assessment.        Dates: Start: 03/22/23               Problem: Mobility       Dates: Start: 03/22/23         Goal: STG-Within one week, patient will propel wheelchair up incline/ramp with CGA.       Dates: Start: 03/22/23               Problem: Mobility Transfers       Dates: Start: 03/22/23         Goal: STG-Within one week, patient will perform bed mobility with SBA.       Dates: Start: 03/22/23            Goal: STG-Within one week, patient will transfer bed to chair with CGA and slide board vs squat pivot.       Dates: Start: 03/22/23               Problem: PT-Long Term Goals       Dates: Start: 03/22/23         Goal: LTG-By discharge, patient will propel wheelchair household distances with Mariano.        Dates: Start: 03/22/23            Goal: LTG-By discharge, patient will perform home exercise program independently.        Dates: Start: 03/22/23            Goal: LTG-By discharge, patient will transfer in/out of a car with supervision.        Dates: Start: 03/22/23            Goal: LTG-By discharge, patient will complete bed mobility with Mariano.        Dates: Start: 03/22/23            Goal: LTG-By discharge, patient will complete w/c to bed transfer using squat pivot vs slide board Mariano.        Dates: Start: 03/22/23

## 2023-03-23 NOTE — CARE PLAN
Problem: Knowledge Deficit - Standard  Goal: Patient and family/care givers will demonstrate understanding of plan of care, disease process/condition, diagnostic tests and medications  Outcome: Progressing     Problem: Skin Integrity  Goal: Skin integrity is maintained or improved  Note: Patient's skin is maintained and free from new or accidental injury this shift.  Will continue to monitor.    The patient is Watcher - Medium risk of patient condition declining or worsening    Shift Goals  Clinical Goals: Safety  Patient Goals: pain control

## 2023-03-23 NOTE — CARE PLAN
"The patient is Stable - Low risk of patient condition declining or worsening    Shift Goals  Clinical Goals: Safety  Patient Goals: pain control    Progress made toward(s) clinical / shift goals:      Problem: Risk for Aspiration  Goal: Patient's risk for aspiration will be absent or decrease  Outcome: Met  Note: Patient is not at risk for aspiration     Patient is not progressing towards the following goals:    Problem: Pain - Standard  Goal: Alleviation of pain or a reduction in pain to the patient’s comfort goal  Outcome: Not Progressing  Note: Pain level consistently 8/10 generalized from RA and Left BKA as well     Problem: Fall Risk - Rehab  Goal: Patient will remain free from falls  Outcome: Not Met  Note: Osiris Healy Fall risk Assessment Score: 14    Moderate fall risk Interventions  - Bed and strip alarm   - Yellow sign by the door   - Yellow wrist band \"Fall risk\"  - Do not leave patient unattended in the bathroom  - Fall risk education provided     "

## 2023-03-23 NOTE — CARE PLAN
Problem: Mobility  Goal: STG-Within one week, patient will propel wheelchair up incline/ramp with CGA.  Outcome: Progressing     Problem: Mobility Transfers  Goal: STG-Within one week, patient will perform bed mobility with SBA.  Outcome: Progressing  Goal: STG-Within one week, patient will transfer bed to chair with CGA and slide board vs squat pivot.  Outcome: Progressing     Problem: Balance  Goal: STG-Within one week, patient will tolerate AMPnoPRO assessment.   Outcome: Met

## 2023-03-23 NOTE — THERAPY
Physical Therapy   Daily Treatment     Patient Name: Richard Hubbard II  Age:  47 y.o., Sex:  male  Medical Record #: [id number]  Today's Date: 3/23/2023     Precautions  Precautions: (P) Non Weight Bearing Left Lower Extremity, Weight Bearing As Tolerated Right Lower Extremity  Comments: (P) L BKA, R foot wound, RA - L shoulder and R wrist exacerbation    Subjective    Patient seated in w/c, agreeable to therapy.      Objective     03/23/23 0701   Precautions   Precautions Non Weight Bearing Left Lower Extremity;Weight Bearing As Tolerated Right Lower Extremity   Comments L BKA, R foot wound, RA - L shoulder and R wrist exacerbation   Pain 0 - 10 Group   Location Shoulder;Wrist  (L/R)   Wheelchair Functional Level of Assist   Wheelchair Assist Moderate Assist   Distance Wheelchair (Feet or Distance) 20   Wheelchair Description Extra time;Assistance with steering;Supervision for safety;Verbal cueing  (limited by L shoulder and R wrist pain, R knee and RA)   Transfer Functional Level of Assist   Bed, Chair, Wheelchair Transfer Minimal Assist   Bed Chair Wheelchair Transfer Description Increased time;Initial preparation for task;Slideboard transfer from bed to wheelchair;Supervision for safety;Verbal cueing   Bed Mobility    Sit to Stand Minimal Assist  (from elevated therapy table with FWW; variable success at w/c height in parallel bars)   Interdisciplinary Plan of Care Collaboration   IDT Collaboration with  Occupational Therapist;Nursing   Patient Position at End of Therapy Seated;Chair Alarm On;Self Releasing Lap Belt Applied;Call Light within Reach;Tray Table within Reach;Phone within Reach   Collaboration Comments Transfer status, CLOF, pain     AMPnoPRO: Scored 9/39   - Required elevated table to perform sit to stand, using FWW for BUE support           Seated <> Prone practice on therapy mat in preparation for tummy time: assistance required to lift RLE onto therapy mat, verbal and tactile cues for  sequencing of log roll. Verbal cues for BUE placement to decrease shoulder discomfort. Required rest breaks in side lying, supine, and prone. Benefits from pillow positioning to support excessive thoracic kyphosis and and cervical extension. Total time 2 minutes, limited by time constraints.     Assessment    Patient tolerated treatment well. Performed the Amputee Mobility Predictor Assessment Tool, scoring 9/39, currently at a K1 classification and indicating poor mobility and standing tolerance. However, patient displays potential for improvement in score with pain management. Patient currently unable to arise from standard chair height to hop; limited by R foot deformity and associated RA pain. Score indicates decreased mobility. Demonstrated bed mobility including sit to supine, log rolling to prone, supine to prone, and supine to sit. Educated patient on how coming to sit from side lying can help decrease pressure on abdominal hernia.     Strengths: Able to follow instructions, Effective communication skills, Good carryover of learning, Good insight into deficits/needs, Independent prior level of function, Making steady progress towards goals, Motivated for self care and independence, Pleasant and cooperative, Supportive family, Willingly participates in therapeutic activities  Barriers: Fatigue, Generalized weakness, Home accessibility, Impaired activity tolerance, Impaired balance, Limited mobility, Pain poorly managed, Pressure ulcer    Plan    BLE strengthening, prone lying 20-30minutes per day, HEP for LE strengthening, bed mobility, transfers slide board vs squat pivot, standing tolerance/balance with LRAD, w/c mobility, curb/step negotiation with LRAD, contracture prevention, residual limb desensitization, residual limb wrapping, Prosthetic and amputee education     Passport items to be completed:  Get in/out of bed safely, in/out of a vehicle, safely use mobility device, walk or wheel around  home/community, navigate up and down stairs, show how to get up/down from the ground, ensure home is accessible, demonstrate HEP, complete caregiver training    Physical Therapy Problems (Active)       Problem: Balance       Dates: Start: 03/22/23         Goal: STG-Within one week, patient will tolerate AMPnoPRO assessment.        Dates: Start: 03/22/23               Problem: Mobility       Dates: Start: 03/22/23         Goal: STG-Within one week, patient will propel wheelchair up incline/ramp with CGA.       Dates: Start: 03/22/23               Problem: Mobility Transfers       Dates: Start: 03/22/23         Goal: STG-Within one week, patient will perform bed mobility with SBA.       Dates: Start: 03/22/23            Goal: STG-Within one week, patient will transfer bed to chair with CGA and slide board vs squat pivot.       Dates: Start: 03/22/23               Problem: PT-Long Term Goals       Dates: Start: 03/22/23         Goal: LTG-By discharge, patient will propel wheelchair household distances with Mariano.        Dates: Start: 03/22/23            Goal: LTG-By discharge, patient will perform home exercise program independently.        Dates: Start: 03/22/23            Goal: LTG-By discharge, patient will transfer in/out of a car with supervision.        Dates: Start: 03/22/23            Goal: LTG-By discharge, patient will complete bed mobility with Mariano.        Dates: Start: 03/22/23            Goal: LTG-By discharge, patient will complete w/c to bed transfer using squat pivot vs slide board Mariano.        Dates: Start: 03/22/23

## 2023-03-23 NOTE — CARE PLAN
The patient is Stable - Low risk of patient condition declining or worsening    Shift Goals  Clinical Goals: Safety  Patient Goals: pain control    Patient is not progressing towards the following goals:    Problem: Bowel Elimination  Goal: Patient will participate in bowel management program  Outcome: Not Progressing  Note: Prn milk of Magnesia and prune juice given this shift for constipation. LBM 3/19/23

## 2023-03-24 NOTE — CARE PLAN
The patient is Stable - Low risk of patient condition declining or worsening    Problem: Pain - Standard  Goal: Alleviation of pain or a reduction in pain to the patient’s comfort goal  Outcome: Progressing  Note: Pt able to participate in therapies and activities this shift. Patient able to verbalize pain level and verbalize an acceptable level of pain.      Problem: Nutrition  Goal: Patient's nutritional and fluid intake will be adequate or improve  Outcome: Progressing  Flowsheets (Taken 3/24/2023 1406)  Oral Nutrition:   Breakfast   Lunch   Dinner   Between 50-75% Consumed  Note: Patient free from s/s dehydration.  Oral and buccal mucosa pink and moist; conjunctiva moist; skin turgor good.  PO intake adequate.

## 2023-03-24 NOTE — PROGRESS NOTES
PSYCHOLOGICAL CONSULTATION:  Reason for admission: Sepsis ([facility]) [A41.9]  S/P BKA (below knee amputation) unilateral, left ([facility]) [Z89.512]  Reason for consult: History of bipolar disorder, depression, adjustment to BKA  Requesting Physician: Norris    Legal status: Voluntary    Chief Complaint: Pain, reduced functionality    HPI: Richard Hubbard II is a 47 y.o. male with a history of rheumatoid arthritis, deformities, foot ulcers who presented to the hospital on 2/28/2023 with evidence of osteomyelitis resulting in altered mental left BKA due to infection.  Psychology was consulted due to history of bipolar disorder and depression, as well as difficulty with adjustment to BKA and difficulty with pain management.    Risk Assessment: current symptoms as reported by pt.  Suicidal Ideation: Denies    Homicidal Ideation: Denies      Psychiatric Review of Systems:current symptoms as reported by pt.  Depression: Endorses significant history of depression, current depressive symptoms in the context of BKA and hospitalization  Zhanna: Denies   Anxiety/Panic Attacks: Endorses anxiety regarding prognosis and future functionality  PTSD symptom: Denies   Psychosis: Denies   Other:         Medical Review of Systems: as reported by pt. All systems reviewed. Only those found to be + are noted below. All others are negative.   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   Tobacco Use    Smoking status: Every Day     Packs/day: 0.25     Types: Cigarettes, Cigars    Smokeless tobacco: Never    Tobacco  comments:     occasional cigar   Vaping Use    Vaping Use: Never used   Substance and Sexual Activity    Alcohol use: No     Alcohol/week: 0.0 oz     Comment: quit 2011 ago- past ETOH abuse    Drug use: No    Sexual activity: Yes     Partners: Female     Comment: ;    Other Topics Concern     Service No    Blood Transfusions Yes    Caffeine Concern No    Occupational Exposure No    Hobby Hazards No    Sleep Concern No    Stress Concern Yes    Weight Concern Yes    Special Diet No    Back Care No    Exercise No    Bike Helmet No    Seat Belt Yes    Self-Exams Yes   Social History Narrative    , lives in [place]     Social Determinants of Health     Financial Resource Strain: Low Risk     Difficulty of Paying Living Expenses: Not hard at all   Food Insecurity: No Food Insecurity    Worried About Running Out of Food in the Last Year: Never true    Ran Out of Food in the Last Year: Never true   Transportation Needs: No Transportation Needs    Lack of Transportation (Medical): No    Lack of Transportation (Non-Medical): No   Physical Activity: Inactive    Days of Exercise per Week: 0 days    Minutes of Exercise per Session: 0 min   Stress: Stress Concern Present    Feeling of Stress : To some extent   Social Connections: Socially Integrated    Frequency of Communication with Friends and Family: More than three times a week    Frequency of Social Gatherings with Friends and Family: Once a week    Attends [employer] Services: More than 4 times per year    Active Member of Clubs or Organizations: Yes    Attends Club or Organization Meetings: More than 4 times per year    Marital Status:    Housing Stability: Low Risk     Unable to Pay for Housing in the Last Year: No    Number of Places Lived in the Last Year: 1    Unstable Housing in the Last Year: No       Medical Hx: Chart reviewed. Only those findings of potential interest to psychiatry are noted below:  Past Medical History:   Diagnosis Date     ADD (attention deficit disorder)     Alcohol abuse     Allergic rhinitis 5/21/2009    Anemia 09/2019    Anxiety 5/21/2009    Apnea, sleep     Arrhythmia     afib when hospitaized for infection    Back pain 5/21/2009    Bipolar disorder ([facility])     Bleeding ulcer 5/21/2009    Bleeding ulcer 5/21/2009    History of anemia-now on nexium     Bowel habit changes     constipation related to narcotics    Daytime sleepiness     Depression 5/21/2009    Eczema 5/21/2009    GERD (gastroesophageal reflux disease)     Heart burn     on meds    Heart murmur     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    Hemorrhagic disorder ([facility])     hx of bleeding ulcers    History of bleeding ulcers 2/12/2015    Hypertension 05/03/2021    pt states well controlled on meds    Insomnia 5/21/2009    Migraine 5/21/2009    Morning headache     Obesity, morbid ([facility]) 5/21/2009    Osteomyelitis ([facility]) 10/7/2019    Pain 09/2019    Chronic knee and back pain    Pubic ramus fracture ([facility]) 9/28/2019    Rheumatoid arteritis ([facility]) 09/2019    knee, feet right hand    Sleep apnea 5/21/2009    Did not tolerate cpap, does not use it    Snoring      Medical Conditions:     Allergies: Benadryl [altaryl], Nsaids, Phenergan [promethazine hcl], Penicillins, and Remeron [mirtazapine]  Medications (currently prescribed at [facility]):    Current Facility-Administered Medications:     capsaicin (Zostrix) 0.025 % cream, , Topical, TID PRN, Keaton Pisano M.D.    nicotine (NICODERM) 7 MG/24HR 7 mg, 7 mg, Transdermal, Daily-0600, Keaton Pisano M.D., 7 mg at 03/24/23 0556    multivitamin tablet 1 Tablet, 1 Tablet, Oral, DAILY, Keaton Pisano M.D., 1 Tablet at 03/24/23 0811    morphine (MS IR) tablet 30 mg, 30 mg, Oral, TID, Keaton Pisano M.D., 30 mg at 03/24/23 0811    Respiratory Therapy Consult, , Nebulization, Continuous RT, Keaton Pisano M.D.    hydrALAZINE (APRESOLINE) tablet 25 mg, 25 mg, Oral, Q8HRS PRN, Keaton Monsivais  MARCUS Pisano    acetaminophen (Tylenol) tablet 650 mg, 650 mg, Oral, Q4HRS PRN, Keaton Pisano M.D.    senna-docusate (PERICOLACE or SENOKOT S) 8.6-50 MG per tablet 2 Tablet, 2 Tablet, Oral, BID, 2 Tablet at 03/24/23 0811 **AND** polyethylene glycol/lytes (MIRALAX) PACKET 1 Packet, 1 Packet, Oral, QDAY PRN **AND** magnesium hydroxide (MILK OF MAGNESIA) suspension 30 mL, 30 mL, Oral, QDAY PRN, 30 mL at 03/22/23 2152 **AND** bisacodyl (DULCOLAX) suppository 10 mg, 10 mg, Rectal, QDAY PRN, Keaton Pisano M.D., 10 mg at 03/23/23 1604    mag hydrox-al hydrox-simeth (MAALOX PLUS ES or MYLANTA DS) suspension 20 mL, 20 mL, Oral, Q2HRS PRN, Keaton Pisano M.D.    ondansetron (ZOFRAN ODT) dispertab 4 mg, 4 mg, Oral, 4X/DAY PRN **OR** ondansetron (ZOFRAN) syringe/vial injection 4 mg, 4 mg, Intramuscular, 4X/DAY PRN, Keaton Pisano M.D.    traZODone (DESYREL) tablet 50 mg, 50 mg, Oral, QHS PRN, Keaton Pisano M.D.    sodium chloride (OCEAN) 0.65 % nasal spray 2 Spray, 2 Spray, Nasal, PRN, Keaton Pisano M.D.    acetaminophen (Tylenol) tablet 650 mg, 650 mg, Oral, Q6HRS, Keaton Pisano M.D., 650 mg at 03/24/23 0556    calcium citrate (CALCITRATE) tablet 950 mg, 950 mg, Oral, DAILY, Keaton Pisano M.D., 950 mg at 03/24/23 0812    carboxymethylcellulose (REFRESH TEARS) 0.5 % ophthalmic drops 1 Drop, 1 Drop, Both Eyes, PRN, Pugh Loi Norris, M.D.    DULoxetine (CYMBALTA) capsule 60 mg, 60 mg, Oral, DAILY, Keaton Pisano M.D., 60 mg at 03/24/23 0810    methocarbamol (ROBAXIN) tablet 750 mg, 750 mg, Oral, TID PRN, Keaton Pisano M.D., 750 mg at 03/23/23 1004    mirtazapine (Remeron) orally disintegrating tab 7.5 mg, 7.5 mg, Oral, HS PRN, Keaton Pisano M.D.    morphine (MS IR) tablet 30 mg, 30 mg, Oral, Q4HRS PRN, Keaton Pisano M.D., 30 mg at 03/24/23 1139    morphine ER (MS CONTIN) tablet 30 mg, 30 mg,  "Oral, Q12HRS, Keaton Pisano M.D., 30 mg at 03/24/23 0812    omeprazole (PRILOSEC) capsule 40 mg, 40 mg, Oral, DAILY, Keaton Pisano M.D., 40 mg at 03/24/23 0811    pregabalin (LYRICA) capsule 150 mg, 150 mg, Oral, TID, Keaton Pisano M.D., 150 mg at 03/24/23 0811    thiamine (Vitamin B-1) tablet 100 mg, 100 mg, Oral, DAILY, Keaton Pisano M.D., 100 mg at 03/24/23 0811    [START ON 3/28/2023] vitamin D2 (Ergocalciferol) (Drisdol) capsule 50,000 Units, 50,000 Units, Oral, Q7 DAYS, Keaton Pisano M.D.    zolpidem (AMBIEN) tablet 5 mg, 5 mg, Oral, HS PRN, Keaton Pisano M.D.  Labs:  No results found for this or any previous visit (from the past 24 hour(s)).       Cranial Imaging Hx: Head CT 2/28/2023 was unremarkable  Other:    Psychiatric Examination:  Vitals: Blood pressure 110/72, pulse 66, temperature 36.2 °C (97.2 °F), temperature source Oral, resp. rate 18, height 1.8 m (5' 10.87\"), weight 95.8 kg (211 lb 3.2 oz), SpO2 93 %.  Musculoskeletal: Laying in bed normal psychomotor activity, no tics or unusual mannerisms noted  Appearance and Eye Contact: Easily established rapport WDWN, appropriate dress and grooming. Behavior is calm, cooperative,  appropriate eye contact  Attention/Alertness: Alert  Thought Process: Linear and logical goal directed  Thought Content: No psychotic processes noted  Speech: Clear with normal rate and rhythm  Mood: \"Doing okay a little overwhelmed\"            Affect: Euthymic, somewhat flat         SI/HI: Denies    Memory: Recent and remote memory appear intact      Orientation: Alert oriented to person place and time  Insight into symptoms: Within normal limits  Judgement into symptoms: Within normal limits    Neuropsychological Testing:   Not formally tested          ASSESSMENT: Richard Hubbard II is a 47 y.o. male with a history of rheumatoid arthritis, deformities, foot ulcers who presented to the hospital on 2/28/2023 " with evidence of osteomyelitis resulting in altered mental left BKA due to infection.  Psychology was consulted due to history of bipolar disorder and depression, as well as difficulty with adjustment to BKA and difficulty with pain management.  Session focused on assessment of current functioning as well as psychoeducation regarding common cognitive and emotional impact of adjustment to BKA as well as hospitalization.  Patient self-reports a history of bipolar disorder diagnosis, denies any manic episode history.  Endorses significant depression history.  Denies being currently medicated for these symptoms.  We will [facility] to ensure appropriate engagement with therapies and medical staff.    DSM5 Diagnostic Considerations: Adjustment disorder with mixed anxiety and depressed mood      PLAN:  Records reviewed: Yes  Discussed patient with other provider: Norris  We will continue to follow  Thank you for the consult.    Ghazal Lozoya, Ph.D.

## 2023-03-24 NOTE — CARE PLAN
The patient is Watcher - Medium risk of patient condition declining or worsening    Shift Goals  Clinical Goals: safety, respiration monitoring  Patient Goals: pain control, rest,    Progress made toward(s) clinical / shift goals:  bed in lower position, locked, call light within reach.    Patient is not progressing towards the following goals:Pain medication was administered and tolerated well; pt continues to experience pain.      Problem: Pain - Standard  Goal: Alleviation of pain or a reduction in pain to the patient’s comfort goal  Outcome: Not Met

## 2023-03-24 NOTE — PROGRESS NOTES
"  Physical Medicine & Rehabilitation Progress Note    Encounter Date: 3/24/2023    Chief Complaint: Decreased mobility, leg pain    Interval Events (Subjective):  Patient sitting up in room. He reports pain is an issue still but he is working through it. He did speak with neuropsychology which helped. He reports otherwise he would like to keep the pain medications the same. Denies NVD. Denies constipation. Denies SOB.     _____________________________________  Interdisciplinary Team Conference   Most recent IDT on 3/23/2023    Discharge Date/Disposition:  4/11/23  _____________________________________      Objective:  VITAL SIGNS: /72   Pulse 66   Temp 36.2 °C (97.2 °F) (Oral)   Resp 18   Ht 1.8 m (5' 10.87\")   Wt 95.8 kg (211 lb 3.2 oz)   SpO2 93%   BMI 29.57 kg/m²   Gen: NAD  Psych: Mood and affect appropriate  CV: RRR, 1+ LLE edema  Resp: CTAB, no upper airway sounds  Abd: NTND  Neuro: AOx4, 4/5 L KE    Laboratory Values:  Recent Results (from the past 72 hour(s))   POCT glucose device results    Collection Time: 03/21/23  5:07 PM   Result Value Ref Range    POC Glucose, Blood 97 65 - 99 mg/dL   CBC with Differential    Collection Time: 03/22/23  5:26 AM   Result Value Ref Range    WBC 6.6 4.8 - 10.8 K/uL    RBC 3.16 (L) 4.70 - 6.10 M/uL    Hemoglobin 9.0 (L) 14.0 - 18.0 g/dL    Hematocrit 29.3 (L) 42.0 - 52.0 %    MCV 92.7 81.4 - 97.8 fL    MCH 28.5 27.0 - 33.0 pg    MCHC 30.7 (L) 33.7 - 35.3 g/dL    RDW 50.2 (H) 35.9 - 50.0 fL    Platelet Count 345 164 - 446 K/uL    MPV 8.5 (L) 9.0 - 12.9 fL    Neutrophils-Polys 53.40 44.00 - 72.00 %    Lymphocytes 33.70 22.00 - 41.00 %    Monocytes 7.90 0.00 - 13.40 %    Eosinophils 2.70 0.00 - 6.90 %    Basophils 1.20 0.00 - 1.80 %    Immature Granulocytes 1.10 (H) 0.00 - 0.90 %    Nucleated RBC 0.00 /100 WBC    Neutrophils (Absolute) 3.54 1.82 - 7.42 K/uL    Lymphs (Absolute) 2.23 1.00 - 4.80 K/uL    Monos (Absolute) 0.52 0.00 - 0.85 K/uL    Eos (Absolute) 0.18 " 0.00 - 0.51 K/uL    Baso (Absolute) 0.08 0.00 - 0.12 K/uL    Immature Granulocytes (abs) 0.07 0.00 - 0.11 K/uL    NRBC (Absolute) 0.00 K/uL   Comp Metabolic Panel (CMP)    Collection Time: 03/22/23  5:26 AM   Result Value Ref Range    Sodium 132 (L) 135 - 145 mmol/L    Potassium 4.2 3.6 - 5.5 mmol/L    Chloride 99 96 - 112 mmol/L    Co2 26 20 - 33 mmol/L    Anion Gap 7.0 7.0 - 16.0    Glucose 68 65 - 99 mg/dL    Bun 6 (L) 8 - 22 mg/dL    Creatinine 0.42 (L) 0.50 - 1.40 mg/dL    Calcium 8.3 (L) 8.5 - 10.5 mg/dL    AST(SGOT) 32 12 - 45 U/L    ALT(SGPT) 23 2 - 50 U/L    Alkaline Phosphatase 403 (H) 30 - 99 U/L    Total Bilirubin 0.3 0.1 - 1.5 mg/dL    Albumin 2.2 (L) 3.2 - 4.9 g/dL    Total Protein 7.8 6.0 - 8.2 g/dL    Globulin 5.6 (H) 1.9 - 3.5 g/dL    A-G Ratio 0.4 g/dL   HEMOGLOBIN A1C    Collection Time: 03/22/23  5:26 AM   Result Value Ref Range    Glycohemoglobin 5.0 4.0 - 5.6 %    Est Avg Glucose 97 mg/dL   TSH with Reflex to FT4    Collection Time: 03/22/23  5:26 AM   Result Value Ref Range    TSH 2.940 0.380 - 5.330 uIU/mL   Vitamin D, 25-hydroxy (blood)    Collection Time: 03/22/23  5:26 AM   Result Value Ref Range    25-Hydroxy   Vitamin D 25 11 (L) 30 - 100 ng/mL   ESTIMATED GFR    Collection Time: 03/22/23  5:26 AM   Result Value Ref Range    GFR (CKD-EPI) 134 >60 mL/min/1.73 m 2   CORRECTED CALCIUM    Collection Time: 03/22/23  5:26 AM   Result Value Ref Range    Correct Calcium 9.7 8.5 - 10.5 mg/dL   POCT glucose device results    Collection Time: 03/22/23  8:31 AM   Result Value Ref Range    POC Glucose, Blood 82 65 - 99 mg/dL       Medications:  Scheduled Medications   Medication Dose Frequency    nicotine  7 mg Daily-0600    multivitamin  1 Tablet DAILY    morphine  30 mg TID    senna-docusate  2 Tablet BID    acetaminophen  650 mg Q6HRS    calcium citrate  950 mg DAILY    DULoxetine  60 mg DAILY    morphine ER  30 mg Q12HRS    omeprazole  40 mg DAILY    pregabalin  150 mg TID    thiamine  100 mg  DAILY    [START ON 3/28/2023] vitamin D2 (Ergocalciferol)  50,000 Units Q7 DAYS     PRN medications: capsaicin, Respiratory Therapy Consult, hydrALAZINE, acetaminophen, senna-docusate **AND** polyethylene glycol/lytes **AND** magnesium hydroxide **AND** bisacodyl, mag hydrox-al hydrox-simeth, ondansetron **OR** ondansetron, traZODone, sodium chloride, carboxymethylcellulose, methocarbamol, mirtazapine, morphine, zolpidem    Diet:  Current Diet Order   Procedures    Diet Order Diet: Regular       Medical Decision Making and Plan:   L BKA - Patient with osteomyelitis while on DMARD for RA with multiple foot wounds. Patient is s/p left BKA on 3/9/23 with Dr. Chambers. NWB LLE. Needs IPOP vs extension brace.   -PT and OT for mobility and ADLs. Per guidelines, 15 hours per week between PT, OT and/or SLP.  -Follow-up Dr. Reyes CARVAJAL - Previously on medication. Currently on hold.      Tobacco Abuse - Patient on nicotine patch. Will need counseling     Anemia - Check AM CBC - 9.0 on admission, will monitor. Recheck 2/27 with Fe panel     Hyponatremia - Check AM CMP - 132, will continue to monitor. Recheck 2/27     Hypocalcemia - Check AM CMP - 8.3 on admission     Morbid obesity due to excess calories - BMI of 35.3 on admission, meets medical criteria. Dietitian to consult     Neuropathy - Patient on Lyrica TID     Pain - Patient on Morphine ER, Morphine IR PRN, Tylenol, and Lyrica  -Schedule Morphine , 1130, and 1500. Continue scheduled Morphine IR and ER as well as lyrica. Will recheck Cr 2/27     Skin - Patient at risk for skin breakdown due to debility in areas including sacrum, achilles, elbows and head in addition to other sites. Nursing to assess skin daily.      GI Ppx - Patient on Prilosec for GERD prophylaxis. Patient on Senna-docusate for constipation prophylaxis.   Last BM: 03/19/23     DVT Ppx - Patient refusing AC on transfer. Patient reports he is aware of  risks.  ____________________________________    T. Loi Pisano MD./PhD.  [facility] - Physical Medicine & Rehabilitation   [facility] - Brain Injury Medicine   ____________________________________

## 2023-03-24 NOTE — THERAPY
Physical Therapy   Daily Treatment     Patient Name: Richard Hubbard II  Age:  47 y.o., Sex:  male  Medical Record #: [id number]  Today's Date: 3/24/2023     Precautions  Precautions: (P) Non Weight Bearing Left Lower Extremity, Weight Bearing As Tolerated Right Lower Extremity, Fall Risk  Comments: (P) L BKA, R foot wound, RA - L shoulder and R wrist exacerbation    Subjective    Patient seated in w/c in bathroom, agreeable to PT treatment.      Objective       03/24/23 0931   PT Charge Group   PT Therapeutic Activities (Units) 2   PT Total Time Spent   PT Individual Total Time Spent (Mins) 30   Precautions   Precautions Non Weight Bearing Left Lower Extremity;Weight Bearing As Tolerated Right Lower Extremity;Fall Risk   Comments L BKA, R foot wound, RA - L shoulder and R wrist exacerbation   Wheelchair Functional Level of Assist   Wheelchair Assist Minimal Assist   Distance Wheelchair (Feet or Distance) 40   Wheelchair Description Extra time;Leg rest management;Supervision for safety;Verbal cueing;Assistance with steering  (Assistance given with turning corners, patient limited by R wrist pain)   Bed Mobility    Sit to Stand Maximal Assist  (in parallel bars using BUE support)   Interdisciplinary Plan of Care Collaboration   IDT Collaboration with  Nursing;Occupational Therapist   Patient Position at End of Therapy Seated;Chair Alarm On;Self Releasing Lap Belt Applied;Call Light within Reach;Tray Table within Reach;Phone within Reach   Collaboration Comments CLOF, new slideboard     Switched out patient w/c from 20W x 18D x 20H to 18W x 18D x 19H, due to patient chair being too wide and patient having difficulty propelling chair and providing inadequate back support. Also, trialed new seat cushion. Patient propelling 20ft in original chair and 40ft in new chair, demonstrating significantly improved body mechanics and positioning and reduced shoulder and wrist discomfort.     Exchanged wheelchairs in parallel  bars, patient requiring maxA to come to standing and sit down; however CGA to maintain balance. Standing time 45 seconds.     Provided patient new slide board to ease with transfers and avoid clothes being snagged.     Assessment    Patient tolerated treatment well. Patient reports liking the new chair, however feels like the arm rests are too short. Patient positioning is more ideal for improving posture, hip and knee positioning, and ability to reach wheels for propulsion.     Strengths: Able to follow instructions, Effective communication skills, Good carryover of learning, Good insight into deficits/needs, Independent prior level of function, Making steady progress towards goals, Motivated for self care and independence, Pleasant and cooperative, Supportive family, Willingly participates in therapeutic activities  Barriers: Fatigue, Generalized weakness, Home accessibility, Impaired activity tolerance, Impaired balance, Limited mobility, Pain poorly managed, Pressure ulcer    Plan    BLE strengthening, prone lying 20-30minutes per day, HEP for LE strengthening, bed mobility, transfers slide board vs squat pivot, standing tolerance/balance with LRAD, w/c mobility/ramp negotiation, problem solve home entry (2 steps), contracture prevention, residual limb desensitization, residual limb wrapping, Prosthetic and amputee education     Passport items to be completed:  Get in/out of bed safely, in/out of a vehicle, safely use mobility device, walk or wheel around home/community, navigate up and down stairs, show how to get up/down from the ground, ensure home is accessible, demonstrate HEP, complete caregiver training    Physical Therapy Problems (Active)       Problem: Mobility       Dates: Start: 03/22/23         Goal: STG-Within one week, patient will propel wheelchair up incline/ramp with CGA.       Dates: Start: 03/22/23               Problem: Mobility Transfers       Dates: Start: 03/22/23         Goal: STG-Within  one week, patient will perform bed mobility with SBA.       Dates: Start: 03/22/23            Goal: STG-Within one week, patient will transfer bed to chair with CGA and slide board vs squat pivot.       Dates: Start: 03/22/23               Problem: PT-Long Term Goals       Dates: Start: 03/22/23         Goal: LTG-By discharge, patient will propel wheelchair household distances with Mariano.        Dates: Start: 03/22/23            Goal: LTG-By discharge, patient will perform home exercise program independently.        Dates: Start: 03/22/23            Goal: LTG-By discharge, patient will transfer in/out of a car with supervision.        Dates: Start: 03/22/23            Goal: LTG-By discharge, patient will complete bed mobility with Mariano.        Dates: Start: 03/22/23            Goal: LTG-By discharge, patient will complete w/c to bed transfer using squat pivot vs slide board Mariano.        Dates: Start: 03/22/23

## 2023-03-24 NOTE — THERAPY
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 Pt agreeable to both sessions.     Objective       03/24/23 0831 03/24/23 1101   OT Charge Group   OT Self Care / ADL (Units) 4  --    OT Therapy Activity (Units)  --  2   OT Total Time Spent   OT Individual Total Time Spent (Mins) 60 30   Pain 0 - 10 Group   Location  --  Arm;Back;Generalized;Hand   Location Orientation  --  Left;Right   Pain Rating Scale (NPRS)  --  8   Description  --  Aching;Sharp   Comfort Goal  --  Comfort with Movement;Perform Activity   Therapist Pain Assessment  --  Post Activity Pain Same as Prior to Activity;Nurse Notified   Functional Level of Assist   Grooming Modified Independent;Seated  --    Grooming Description Seated in wheelchair at sink  (for oral care)  --    Bathing Minimal Assist  --    Bathing Description Grab bar;Hand held shower;Tub bench;Assit with back;Assit with perineal;Increased time;Verbal cueing;Supervision for safety;Set up for wound protection;Set-up of equipment  (Assist with waterproofing of R-foot and L-limb. OTR assisted with washing rear and back. Lateral leaning for rear. Pt able to reach all other parts with increased time.)  --    Upper Body Dressing Supervision  --    Upper Body Dressing Description Set-up of equipment  (to don pullover shirt)  --    Lower Body Dressing Minimal Assist  --    Lower Body Dressing Description Sock aid;Shoe horn;Increased time;Set-up of equipment;Supervision for safety;Verbal cueing  (Min A for draw up with pt completing dead lift in w/c. Pt able to complete all remaining parts with lateral leaning for draw  down. Donned socks, shoes, and hospital shorts, following OTR fabrication)  --    Bed, Chair, Wheelchair Transfer Contact Guard Assist Contact Guard Assist   Bed Chair Wheelchair Transfer Description Increased time;Slideboard transfer from bed to wheelchair;Verbal cueing;Supervision for safety;Set-up of equipment  (CGA to brace w/c during transfer and assist to place SB. w/c>EOB) Increased time;Slideboard transfer from bed to wheelchair;Verbal cueing;Supervision for safety;Set-up of equipment  (CGA to brace w/c during transfer and assist to place SB. w/c>EOB)   Tub / Shower Transfers Contact Guard Assist  --    Tub Shower Transfer Description Adaptive equipment;Shower bench;Increased time;Initial preparation for task;Supervision for safety;Verbal cueing;Set-up of equipment  (CGA to brace w/c during transfer and assist to place SB. w/c>EOB)  --    Interdisciplinary Plan of Care Collaboration   IDT Collaboration with  Nursing;Physical Therapist  --    Patient Position at End of Therapy Seated;Self Releasing Lap Belt Applied In Bed;Call Light within Reach;Tray Table within Reach;Family / Friend in Room   Collaboration Comments CLOF, new SB needed  --      1100 - Trialed abdominal binder to manage pt's hernia to increase ability to urinate in urinal while seated in w/c. Donned in supine and when attempting to complete log rolling, pt reporting increased low back pain. Pt then engaged in bilateral scooting on SB on EOB to increase overall UB strength and ind with functional transfers. He completed 3x each direction with breaks.    Assessment    Pt making significant progress towards goals with self-care skills and functional transfer this session. Noted with pain under control, pt with increased tolerance to all forms of tx. Would benefit from continued practice with toilet transfers.  Strengths: Able to follow instructions, Alert and oriented, Effective communication skills, Good insight into deficits/needs, Independent  prior level of function, Motivated for self care and independence, Pleasant and cooperative, Supportive family, Willingly participates in therapeutic activities  Barriers: Decreased endurance, Fatigue, Generalized weakness, Home accessibility, Impaired activity tolerance, Impaired balance, Limited mobility, Pain, Pain poorly managed    Plan    W/c<>drop arm commode over toilet transfer, BUE strengthening within reduced pain ranges, sitting balance    Passport items to be completed:  Perform bathroom transfers, complete dressing, complete feeding, get ready for the day, prepare a simple meal, participate in household tasks, adapt home for safety needs, demonstrate home exercise program, complete caregiver training     Occupational Therapy Goals (Active)       Problem: Bathing       Dates: Start: 03/22/23         Goal: STG-Within one week, patient will bathe at CGA level using DME/AE PRN.       Dates: Start: 03/22/23               Problem: Dressing       Dates: Start: 03/22/23         Goal: STG-Within one week, patient will dress LB at Mod A level using DME/AE PRN.       Dates: Start: 03/22/23               Problem: Functional Transfers       Dates: Start: 03/22/23         Goal: STG-Within one week, patient will transfer to toilet at Min A level using DME/AE PRN.       Dates: Start: 03/22/23               Problem: OT Long Term Goals       Dates: Start: 03/22/23         Goal: LTG-By discharge, patient will complete basic self care tasks at Min A-Mod I level using DME/AE PRN.       Dates: Start: 03/22/23            Goal: LTG-By discharge, patient will perform bathroom transfers at SBA-Mod I level using DME/AE PRN.       Dates: Start: 03/22/23            Goal: LTG-By discharge, patient will complete basic home management at Min A-Mod I level using DME/AE PRN.       Dates: Start: 03/22/23               Problem: Toileting       Dates: Start: 03/22/23         Goal: STG-Within one week, patient will complete toileting tasks  at Max A level using DME/AE PRN.       Dates: Start: 03/22/23

## 2023-03-24 NOTE — THERAPY
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 exacerbation   Wheelchair Functional Level of Assist   Wheelchair Assist Minimal Assist  (to SBA)   Distance Wheelchair (Feet or Distance) 50   Wheelchair Description Extra time;Verbal cueing;Supervision for safety   Transfer Functional Level of Assist   Bed, Chair, Wheelchair Transfer Contact Guard Assist   Bed Chair Wheelchair Transfer Description Increased time;Slideboard transfer from bed to wheelchair;Verbal cueing;Supervision for safety;Set-up of equipment   Sitting Lower Body Exercises   Long Arc Quad 1 set of 10  (1.5lb ankle weight on RLE)   Bed Mobility    Supine to Sit Contact Guard Assist   Sit to Supine Contact Guard Assist   Scooting Contact Guard Assist   Rolling Contact Guard Assist   Interdisciplinary Plan of Care Collaboration   IDT Collaboration with  Occupational Therapist;Other (See Comments)  ([employer] rep)   Patient Position at End of Therapy In Bed;Call Light within Reach;Tray Table within Reach;Phone within Reach   Collaboration Comments CLOCARMEN l knee immobilizer, POC     Education provided on skin inspection for BLE and use of knee  immobilizer. Provided patient with a long handled mirror. Patient demonstrated use of and verbalized understanding of skin inspections with device. Educated patient on recommended use of knee immobilier for LLE with emphasis on expectation of use of device during future ambulation, and to avoid excessive knee flexion.     Sustained prone position on therapy mat 20 minutes while performing Therex as below:               - Glute sets 2x 10 with 10 second hold              - L knee flex/ext AROM - L 2X10              - R knee resisted knee flexion - 1.5lb ankle weight 2x10   Seated LAQ on edge of therapy mat with BUE support for maintaining seated balance - 1x10 each side with 1.5lb ankle weight on RLE, CGA.     Educated patient on head-hips relationship for ease of slide board transfers. Performed slide board transfers with emphasis on sequencing and improved pelvis clearing during transfer. Patient performed 4x slide board transfers with faded verbal cueing of sequence, and fading set up of equipment - CGA for all transfers.     Assessment    Patient tolerated therapy well. Patient had improved demonstration of slideboard sequencing and mechanics throughout session, and requiring fading verbal cues and assistance throughout. Patient had improved bilateral hamstring and quadriceps concentric and eccentric control during therapeutic exercises.    Strengths: Able to follow instructions, Effective communication skills, Good carryover of learning, Good insight into deficits/needs, Independent prior level of function, Making steady progress towards goals, Motivated for self care and independence, Pleasant and cooperative, Supportive family, Willingly participates in therapeutic activities  Barriers: Fatigue, Generalized weakness, Home accessibility, Impaired activity tolerance, Impaired balance, Limited mobility, Pain poorly managed, Pressure ulcer    Plan    Seated dynamic balance, BLE strengthening, prone lying  20-30minutes per day, HEP for LE strengthening, bed mobility, transfers slide board vs squat pivot, standing tolerance/balance with LRAD, w/c mobility/ramp negotiation, problem solve home entry (2 steps), contracture prevention, residual limb desensitization, residual limb wrapping, Prosthetic and amputee education     Passport items to be completed:  Get in/out of bed safely, in/out of a vehicle, safely use mobility device, walk or wheel around home/community, navigate up and down stairs, show how to get up/down from the ground, ensure home is accessible, demonstrate HEP, complete caregiver training    Physical Therapy Problems (Active)       Problem: Mobility       Dates: Start: 03/22/23         Goal: STG-Within one week, patient will propel wheelchair up incline/ramp with CGA.       Dates: Start: 03/22/23               Problem: Mobility Transfers       Dates: Start: 03/22/23         Goal: STG-Within one week, patient will perform bed mobility with SBA.       Dates: Start: 03/22/23            Goal: STG-Within one week, patient will transfer bed to chair with CGA and slide board vs squat pivot.       Dates: Start: 03/22/23               Problem: PT-Long Term Goals       Dates: Start: 03/22/23         Goal: LTG-By discharge, patient will propel wheelchair household distances with Mariano.        Dates: Start: 03/22/23            Goal: LTG-By discharge, patient will perform home exercise program independently.        Dates: Start: 03/22/23            Goal: LTG-By discharge, patient will transfer in/out of a car with supervision.        Dates: Start: 03/22/23            Goal: LTG-By discharge, patient will complete bed mobility with Mariano.        Dates: Start: 03/22/23            Goal: LTG-By discharge, patient will complete w/c to bed transfer using squat pivot vs slide board Mariano.        Dates: Start: 03/22/23

## 2023-03-24 NOTE — CARE PLAN
The patient is Stable - Low risk of patient condition declining or worsening    Problem: Skin Integrity  Goal: Skin integrity is maintained or improved  Outcome: Progressing  Note: Left BKA intact with sutures, scabbing, scant drainage and well approximated Patient remains free from s/s infection; afebrile.      Problem: Bladder / Voiding  Goal: Patient will establish and maintain regular urinary output  Outcome: Progressing  Note: Patient voiding adequate amounts of clear, yellow urine. Denies dysuria and flank pain: afebrile. Patient is continent of bladder this shift. Uses urinal.     Problem: Bowel Elimination  Goal: Patient will participate in bowel management program  Outcome: Not Progressing  Note: Patient given suppository with no results so far. Pt feels that he will have on soon.

## 2023-03-24 NOTE — PROGRESS NOTES
Received shift report and assumed care of patient.  Patient awake, calm and stable, currently positioned in bed for comfort and safety; call light within reach.  7/10 left knee and back pain at this time.  Heat pack applied. Will continue to monitor.

## 2023-03-25 NOTE — THERAPY
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    Subjective    Pt reports fatigue this afternoon       Objective       03/25/23 1400   PT Charge Group   PT Therapeutic Exercise (Units) 2   PT Total Time Spent   PT Individual Total Time Spent (Mins) 30   Precautions   Precautions Fall Risk;Non Weight Bearing Left Lower Extremity;Weight Bearing As Tolerated Right Lower Extremity;Immobilizer Left Lower Extremity   Pain 0 - 10 Group   Location Wrist;Shoulder   Location Orientation Right;Left   Pain Rating Scale (NPRS) 4   Gait Functional Level of Assist    Gait Level Of Assist Unable to Participate   Wheelchair Functional Level of Assist   Wheelchair Assist Stand by Assist   Distance Wheelchair (Feet or Distance) 50   Wheelchair Description Extra time;Limited by fatigue   Transfer Functional Level of Assist   Bed, Chair, Wheelchair Transfer Standby Assist   Bed Chair Wheelchair Transfer Description Set-up of equipment;Verbal cueing;Supervision for safety;Increased time   Bed Mobility    Sit to Stand Minimal Assist   Interdisciplinary Plan of Care Collaboration   Patient Position at End of Therapy Seated;Phone within Reach;Tray Table within Reach;Call Light within Reach       Pt was brought to therapy gym via w/c. He did perform 50' of w/c mobility with SBA increased time required.     Pt performed seated therex as follows  -R knee extension 4# ankle weight 2x10   -R hip flexion 4# ankle weight 2x10  -Hip abduction blue theraband   -RLE leg press blue theraband x 20    Pt performed squat pivot transfer w/c to mat table with CGA.     Pt performed sit to stand x 5 repetitions  from elevated mat table to FWW with CGA-MIN A. Pt required cues for anterior weight shifting, sequencing with hands, and upright posture. He required extended seated rest between repetitions.     Squat pivot transfer back to w/c with CGA. Pt as brought back to his room via w/c.     Slideboard transfer back to bed with CGA. Sit to supine with SBA. Pt remained supine in bed with all needs in reach.   Assessment    Pt tolerated session well he is limited by fatigue and R wrist pain.     Strengths: Able to follow instructions, Effective communication skills, Good insight into deficits/needs, Independent prior level of function  Barriers: Fatigue, Generalized weakness, Home accessibility, Impaired activity tolerance, Impaired balance, Pain, Pain poorly managed, Pressure ulcer    Plan    Continue to progress pt's functional independence as per plan of care.     Passport items to be completed:  Get in/out of bed safely, in/out of a vehicle, safely use mobility device, walk or wheel around home/community, navigate up and down stairs, show how to get up/down from the ground, ensure home is accessible, demonstrate HEP, complete caregiver training    Physical Therapy Problems (Active)       Problem: Mobility       Dates: Start: 03/22/23         Goal: STG-Within one week, patient will propel wheelchair up incline/ramp with CGA.       Dates: Start: 03/22/23               Problem: Mobility Transfers       Dates: Start: 03/22/23         Goal: STG-Within one week, patient will perform bed mobility with SBA.       Dates: Start: 03/22/23            Goal: STG-Within one week, patient will transfer bed to chair with CGA and slide board vs squat pivot.       Dates: Start: 03/22/23               Problem: PT-Long Term Goals       Dates: Start: 03/22/23         Goal: LTG-By discharge, patient will propel wheelchair household distances with Mariano.        Dates: Start: 03/22/23            Goal: LTG-By discharge, patient will perform home  exercise program independently.        Dates: Start: 03/22/23            Goal: LTG-By discharge, patient will transfer in/out of a car with supervision.        Dates: Start: 03/22/23            Goal: LTG-By discharge, patient will complete bed mobility with Mariano.        Dates: Start: 03/22/23            Goal: LTG-By discharge, patient will complete w/c to bed transfer using squat pivot vs slide board Mariano.        Dates: Start: 03/22/23

## 2023-03-25 NOTE — PROGRESS NOTES
Received shift report and assumed care of patient.  Patient awake, calm and stable, currently positioned in bed for comfort and safety; call light within reach.  2/10 generalized  pain at this time. See mar for medication  Will continue to monitor.

## 2023-03-25 NOTE — THERAPY
Physical Therapy   Daily Treatment     Patient Name: Richard Hubbard II  Age:  47 y.o., Sex:  male  Medical Record #: [id number]  Today's Date: 3/25/2023     Precautions  Precautions: (P) Fall Risk, Non Weight Bearing Left Lower Extremity, Weight Bearing As Tolerated Right Lower Extremity  Comments: L BKA, R foot wound, RA - L shoulder and R wrist exacerbation    Subjective    Pt reports significant fatigue throughout session. He requires several rest breaks. He reports 8/10 R wrist pain.      Objective       03/25/23 0830   PT Charge Group   PT Therapeutic Exercise (Units) 1   PT Therapeutic Activities (Units) 3   PT Total Time Spent   PT Individual Total Time Spent (Mins) 60   Precautions   Precautions Fall Risk;Non Weight Bearing Left Lower Extremity;Weight Bearing As Tolerated Right Lower Extremity   Pain 0 - 10 Group   Location Generalized;Wrist   Location Orientation Left;Anterior;Posterior   Pain Rating Scale (NPRS) 8   Description Aching   Gait Functional Level of Assist    Gait Level Of Assist Unable to Participate   Wheelchair Functional Level of Assist   Wheelchair Assist Minimal Assist   Distance Wheelchair (Feet or Distance) 60   Transfer Functional Level of Assist   Bed, Chair, Wheelchair Transfer Contact Guard Assist   Bed Chair Wheelchair Transfer Description Verbal cueing;Slideboard transfer from bed to wheelchair   Bed Mobility    Supine to Sit Contact Guard Assist   Sit to Supine Contact Guard Assist   Sit to Stand Minimal Assist  (in parallel bars)   Interdisciplinary Plan of Care Collaboration   Patient Position at End of Therapy In Bed;Tray Table within Reach;Call Light within Reach;Phone within Reach   Strengths & Barriers   Strengths Able to follow instructions;Effective communication skills;Good insight into deficits/needs;Independent prior level of function   Barriers Fatigue;Generalized weakness;Home accessibility;Impaired activity tolerance;Impaired balance;Pain;Pain poorly  managed;Pressure ulcer       Pt supine in bed at start of session. Supine to sit with CGA. Pt performed slide board transfer with CGA from bed to w/c. He required VC for technique and proximity to chair.     Pt self propelled w/c 60' with MIN A, he required 3 rest breaks due to fatigue.     Pt performed sit to  parallel bars x 3 repetitions with MIN A. He was required to maintain static standing x 1 min each bout. Third bout he performed 10 mini squats using the RLE all with CGA. Pt attempted to perform another pull to stand transfer however despite being provided with MAX A was unable to stand from chair. He required extended rest between bouts of standing.     Pt performed slide board transfer to mat table with CGA. He maintained sitting edge of mat x 15 mins during which performed BUE cross body diagonals with ball x 10 B, anterior/posterior weight shifting, and lateral weight shifting. He again required several rest breaks due to fatigue. Slide board transfer back to w/c with CGA.     Therapist propelled pt back to room as he stated inability to do so d/t fatigue. Slideboard transfer from w/c to bed with CGA. Sit to supine with CGA. Pt remained in bed with all needs in reach.     Assessment    Pt tolerated session well. He is limited by fatigue and is easily fatigable. He c/o R wrist pain affecting his ability to perform transfers.     Strengths: (P) Able to follow instructions, Effective communication skills, Good insight into deficits/needs, Independent prior level of function  Barriers: (P) Fatigue, Generalized weakness, Home accessibility, Impaired activity tolerance, Impaired balance, Pain, Pain poorly managed, Pressure ulcer    Plan    Continue to progress pt's functional independence as per plan of care.     Passport items to be completed:  Get in/out of bed safely, in/out of a vehicle, safely use mobility device, walk or wheel around home/community, navigate up and down stairs, show how to get  up/down from the ground, ensure home is accessible, demonstrate HEP, complete caregiver training    Physical Therapy Problems (Active)       Problem: Mobility       Dates: Start: 03/22/23         Goal: STG-Within one week, patient will propel wheelchair up incline/ramp with CGA.       Dates: Start: 03/22/23               Problem: Mobility Transfers       Dates: Start: 03/22/23         Goal: STG-Within one week, patient will perform bed mobility with SBA.       Dates: Start: 03/22/23            Goal: STG-Within one week, patient will transfer bed to chair with CGA and slide board vs squat pivot.       Dates: Start: 03/22/23               Problem: PT-Long Term Goals       Dates: Start: 03/22/23         Goal: LTG-By discharge, patient will propel wheelchair household distances with Mariano.        Dates: Start: 03/22/23            Goal: LTG-By discharge, patient will perform home exercise program independently.        Dates: Start: 03/22/23            Goal: LTG-By discharge, patient will transfer in/out of a car with supervision.        Dates: Start: 03/22/23            Goal: LTG-By discharge, patient will complete bed mobility with Mariano.        Dates: Start: 03/22/23            Goal: LTG-By discharge, patient will complete w/c to bed transfer using squat pivot vs slide board Mariano.        Dates: Start: 03/22/23

## 2023-03-25 NOTE — CARE PLAN
The patient is Stable - Low risk of patient condition declining or worsening    Problem: Skin Integrity  Goal: Patient's skin integrity will be maintained or improve  Outcome: Progressing  Note: Patient's skin remains intact and free from new or accidental injury this shift.  Patient remains free from s/s infection; afebrile.       Problem: Nutrition  Goal: Patient's nutritional and fluid intake will be adequate or improve  Outcome: Progressing  Note: Patient free from s/s dehydration.  Oral and buccal mucosa pink and moist; conjunctiva moist; skin turgor good.  PO intake adequate.

## 2023-03-25 NOTE — CARE PLAN
"  Problem: Pain - Standard  Goal: Alleviation of pain or a reduction in pain to the patient’s comfort goal  Note: Educate patient of non-pharmacological comfort measures: repositioning, relaxation/breathing technique, cold compress and activities.      Problem: Fall Risk - Rehab  Goal: Patient will remain free from falls  Note: Osiris Healy Fall risk Assessment Score: 14      Moderate fall risk Interventions  - Bed and strip alarm   - Yellow sign by the door   - Yellow wrist band \"Fall risk\"  - Room near to the nurse station  - Do not leave patient unattended in the bathroom  - Fall risk education provided         The patient is Stable - Low risk of patient condition declining or worsening    Shift Goals  Clinical Goals: safety, respiration monitoring  Patient Goals: pain control, rest,    "

## 2023-03-25 NOTE — THERAPY
Occupational Therapy  Daily Treatment     Patient Name: Richard Hubbard II  Age:  47 y.o., Sex:  male  Medical Record #: [id number]  Today's Date: 3/25/2023     Precautions  Precautions: (P) Fall Risk, Non Weight Bearing Left Lower Extremity, Weight Bearing As Tolerated Right Lower Extremity, Immobilizer Left Lower Extremity  Comments: (P) L BKA, R foot wound, RA - L shoulder and R wrist exacerbation         Subjective    Patient was awake in bed and on phone with family.  Agreeable to OT.     Objective       03/25/23 1231   OT Charge Group   OT Therapy Activity (Units) 2   OT Therapeutic Exercise (Units) 2   OT Total Time Spent   OT Individual Total Time Spent (Mins) 60   Precautions   Precautions Fall Risk;Non Weight Bearing Left Lower Extremity;Weight Bearing As Tolerated Right Lower Extremity;Immobilizer Left Lower Extremity   Comments L BKA, R foot wound, RA - L shoulder and R wrist exacerbation   Pain 0 - 10 Group   Location Wrist;Shoulder   Location Orientation Right;Left   Therapist Pain Assessment During Activity   Functional Level of Assist   Lower Body Dressing Standby Assist   Lower Body Dressing Description Set-up of equipment;Supervision for safety  (to don LE immobilizer)   Bed, Chair, Wheelchair Transfer Standby Assist   Bed Chair Wheelchair Transfer Description Set-up of equipment;Slideboard transfer from bed to wheelchair;Supervision for safety  (setup/sba slideboard bed to w/c and w/c <> mat table)   Sitting Upper Body Exercises   Sitting Upper Body Exercises Yes   Tricep Press 3 sets of 10;Bilateral;Weight (See Comments for lbs)  (10 lbs on rickshaw; pt did not want to do more than 10 lbs)   Upper Extremity Bike Minutes / Rest Breaks (See Comments)  (motomed cycle gear 5 x 10 minutes without rest breaks)   Bed Mobility    Supine to Sit Standby Assist   Scooting Standby Assist   Interdisciplinary Plan of Care Collaboration   Patient Position at End of Therapy Seated;Call Light within Reach;Tray  Table within Reach;Phone within Reach;Self Releasing Lap Belt Applied     W/C mobility supervised from room to main gym for endurance building.      3 x 10 A/P leans seated EOM for core strengthening.    Assessment    Patient tolerated all activities well with only complaint being R wrist pain, mostly during transfers.  He is motivated and working hard.  Strengths: Able to follow instructions, Alert and oriented, Effective communication skills, Good insight into deficits/needs, Independent prior level of function, Motivated for self care and independence, Pleasant and cooperative, Supportive family, Willingly participates in therapeutic activities  Barriers: Decreased endurance, Fatigue, Generalized weakness, Home accessibility, Impaired activity tolerance, Impaired balance, Limited mobility, Pain, Pain poorly managed    Plan    W/c<>drop arm commode over toilet transfer, BUE strengthening within reduced pain ranges, sitting balance    Occupational Therapy Goals (Active)       Problem: Bathing       Dates: Start: 03/22/23         Goal: STG-Within one week, patient will bathe at CGA level using DME/AE PRN.       Dates: Start: 03/22/23               Problem: Dressing       Dates: Start: 03/22/23         Goal: STG-Within one week, patient will dress LB at Mod A level using DME/AE PRN.       Dates: Start: 03/22/23               Problem: Functional Transfers       Dates: Start: 03/22/23         Goal: STG-Within one week, patient will transfer to toilet at Min A level using DME/AE PRN.       Dates: Start: 03/22/23               Problem: OT Long Term Goals       Dates: Start: 03/22/23         Goal: LTG-By discharge, patient will complete basic self care tasks at Min A-Mod I level using DME/AE PRN.       Dates: Start: 03/22/23            Goal: LTG-By discharge, patient will perform bathroom transfers at SBA-Mod I level using DME/AE PRN.       Dates: Start: 03/22/23            Goal: LTG-By discharge, patient will complete  basic home management at Min A-Mod I level using DME/AE PRN.       Dates: Start: 03/22/23               Problem: Toileting       Dates: Start: 03/22/23         Goal: STG-Within one week, patient will complete toileting tasks at Max A level using DME/AE PRN.       Dates: Start: 03/22/23

## 2023-03-25 NOTE — THERAPY
Occupational Therapy  Daily Treatment     Patient Name: Richard Hubbard II  Age:  47 y.o., Sex:  male  Medical Record #: [id number]  Today's Date: 3/25/2023     Precautions  Precautions: (P) Fall Risk, Non Weight Bearing Left Lower Extremity, Weight Bearing As Tolerated Right Lower Extremity, Immobilizer Left Lower Extremity  Comments: (P) L BKA, R foot wound, RA - L shoulder and R wrist exacerbation         Subjective    Patient was resting in bed after completing PT earlier in the morning.  He was somewhat frustrated about not having received a therapy schedule today.  He stated he was fatigued from PT. He states he will not be able to wear his LE immobilizer when he is having a BM on the commode due to not having leverage to bend over and push to have a BM.     Objective       03/25/23 1001   OT Charge Group   OT Self Care / ADL (Units) 1   OT Therapy Activity (Units) 1   OT Total Time Spent   OT Individual Total Time Spent (Mins) 30   Precautions   Precautions Fall Risk;Non Weight Bearing Left Lower Extremity;Weight Bearing As Tolerated Right Lower Extremity;Immobilizer Left Lower Extremity   Comments L BKA, R foot wound, RA - L shoulder and R wrist exacerbation   Pain 0 - 10 Group   Location Wrist;Shoulder   Location Orientation Right;Left   Therapist Pain Assessment During Activity   Functional Level of Assist   Lower Body Dressing Minimal Assist   Lower Body Dressing Description Set-up of equipment;Supervision for safety  (min A to don/doff L LE immobilizer)   Bed, Chair, Wheelchair Transfer Standby Assist   Bed Chair Wheelchair Transfer Description Set-up of equipment;Slideboard transfer from bed to wheelchair;Supervision for safety;Verbal cueing  (setup/sba slideboard bed <> w/c with cues)   Toilet Transfers Contact Guard Assist   Toilet Transfer Description Adaptive equipment;Set-up of equipment;Supervision for safety;Verbal cueing  (CGA with cues for slideboard transfer w/c <> over the toilet bariatric  commode transfer)   Bed Mobility    Supine to Sit Standby Assist   Sit to Supine Standby Assist   Scooting Standby Assist   Interdisciplinary Plan of Care Collaboration   Patient Position at End of Therapy In Bed;Call Light within Reach;Tray Table within Reach;Phone within Reach     Educated provided on technique for lateral scoot transfer from w/c <> commode over toilet.    Assessment    Patient required several rest breaks during session and was breathing somewhat heavily during session.  He presents with low activity tolerance.  R wrist pain and L shoulder pain from RA are barriers at this time as patient is not taking his RA medication.  Strengths: Able to follow instructions, Alert and oriented, Effective communication skills, Good insight into deficits/needs, Independent prior level of function, Motivated for self care and independence, Pleasant and cooperative, Supportive family, Willingly participates in therapeutic activities  Barriers: Decreased endurance, Fatigue, Generalized weakness, Home accessibility, Impaired activity tolerance, Impaired balance, Limited mobility, Pain, Pain poorly managed    Plan    W/c<>drop arm commode over toilet transfer, BUE strengthening within reduced pain ranges, sitting balance    Occupational Therapy Goals (Active)       Problem: Bathing       Dates: Start: 03/22/23         Goal: STG-Within one week, patient will bathe at CGA level using DME/AE PRN.       Dates: Start: 03/22/23               Problem: Dressing       Dates: Start: 03/22/23         Goal: STG-Within one week, patient will dress LB at Mod A level using DME/AE PRN.       Dates: Start: 03/22/23               Problem: Functional Transfers       Dates: Start: 03/22/23         Goal: STG-Within one week, patient will transfer to toilet at Min A level using DME/AE PRN.       Dates: Start: 03/22/23               Problem: OT Long Term Goals       Dates: Start: 03/22/23         Goal: LTG-By discharge, patient will  complete basic self care tasks at Min A-Mod I level using DME/AE PRN.       Dates: Start: 03/22/23            Goal: LTG-By discharge, patient will perform bathroom transfers at SBA-Mod I level using DME/AE PRN.       Dates: Start: 03/22/23            Goal: LTG-By discharge, patient will complete basic home management at Min A-Mod I level using DME/AE PRN.       Dates: Start: 03/22/23               Problem: Toileting       Dates: Start: 03/22/23         Goal: STG-Within one week, patient will complete toileting tasks at Max A level using DME/AE PRN.       Dates: Start: 03/22/23

## 2023-03-26 NOTE — CARE PLAN
"The patient is Watcher - Medium risk of patient condition declining or worsening    Shift Goals  Clinical Goals: safety, respiration monitoring  Patient Goals: pain control, rest,      Problem: Mobility  Goal: Patient's capacity to carry out activities will improve  Outcome: Progressing    Patient is able to transfer himself from bed to wheelchair and back with slide board.  Minimal assistance is required.       Problem: Fall Risk - Rehab  Goal: Patient will remain free from falls  Outcome: Progressing     Osiris Healy Fall risk Assessment Score: 14    Moderate fall risk Interventions  - Bed and strip alarm   - Yellow sign by the door   - Yellow wrist band \"Fall risk\"  - Room near to the nurse station  - Do not leave patient unattended in the bathroom  - Fall risk education provided        "

## 2023-03-26 NOTE — WOUND TEAM
[facility]  Inpatient Services  Initial Wound and Skin Care Evaluation    Admission Date: 3/21/2023     Last order of IP CONSULT TO WOUND CARE was found on 3/21/2023 from Hospital Encounter on 3/9/2023     HPI, PMH, SH: Reviewed    Past Surgical History:   Procedure Laterality Date    KNEE AMPUTATION BELOW Left 3/9/2023    Procedure: AMPUTATION, BELOW KNEE;  Surgeon: Wayne Chambers M.D.;  Location: SURGERY [facility];  Service: Orthopedics    PB TOTAL KNEE ARTHROPLASTY Right 7/22/2020    Procedure: ARTHROPLASTY, KNEE, TOTAL;  Surgeon: Temo Pires M.D.;  Location: SURGERY [facility];  Service: Orthopedics    TENDON TRANSFER Right 1/22/2020    Procedure: RIGHT DISTAL ULNA RESECTION AND EXTENSOR TENDON TRANSFER;  Surgeon: Marine Rushing M.D.;  Location: SURGERY [facility];  Service: Orthopedics    OTHER ORTHOPEDIC SURGERY Right 2020    total right knee replacement    IRRIGATION & DEBRIDEMENT ORTHO Left 10/9/2019    Procedure: IRRIGATION AND DEBRIDEMENT, WOUND - PELVIC OSTEOMYELITIS;  Surgeon: You Olivares M.D.;  Location: SURGERY [facility];  Service: Orthopedics    OTHER SURGICAL PROCEDURE  2019    osteomelitis of pelvis cleaned    GASTRIC BYPASS LAPAROSCOPIC  2000    Lucila en y    CHOLECYSTECTOMY  2000    OTHER SURGICAL PROCEDURE      repair of broken penis     Social History     Tobacco Use    Smoking status: Every Day     Packs/day: 0.25     Types: Cigarettes, Cigars    Smokeless tobacco: Never    Tobacco comments:     occasional cigar   Substance Use Topics    Alcohol use: No     Alcohol/week: 0.0 oz     Comment: quit 2011 ago- past ETOH abuse     No chief complaint on file.    Diagnosis: Sepsis ([facility]) [A41.9]  S/P BKA (below knee amputation) unilateral, left ([facility]) [Z89.512]    Unit where seen by Wound Team: [facility]04/02     WOUND CONSULT/FOLLOW UP RELATED TO: L BKA incision, R plantar foot     WOUND HISTORY:  The patient is a 47 y.o.male with a past medical history of  reported arthritis on Humira with deformities and foot ulcers;  who presented on 2/28/2023 10:51 AM with MRI evidence of osteomyelitis of the left navicular bone.  Patient was taken to the OR on 3/9 for L BKA performed by Dr. Chambers. Patient's hospital stay has been complicated by difficult to control pain control.     WOUND ASSESSMENT/LDA       Wound 03/25/22 Full Thickness Wound Foot Right --Right Plantar Mid Foot (Active)   Wound Image   03/26/23 1249   Site Assessment Dry 03/26/23 1249   Periwound Assessment Dry 03/26/23 1249   Margins Defined edges;Attached edges 03/26/23 1249   Drainage Amount None 03/26/23 1249   Treatments Cleansed;Site care 03/26/23 1249   Wound Cleansing Approved Wound Cleanser 03/26/23 1249   Periwound Protectant Not Applicable 03/26/23 1249   Dressing Cleansing/Solutions 3% Betadine 03/26/23 1249   Dressing Options Silicone Adhesive Foam 03/26/23 1249   Dressing Changed New 03/26/23 1249   Dressing Change/Treatment Frequency Daily, and As Needed 03/26/23 1249   Non-staged Wound Description Full thickness 03/26/23 1249   Wound Odor None 03/26/23 1249   Exposed Structures None 03/26/23 1249       Wound 03/09/23 Incision Pretibial Proximal (Active)   Wound Image   03/21/23 1255   Site Assessment JI 03/25/23 2057   Periwound Assessment Clean;Dry;Intact;Purple 03/25/23 1646   Margins Attached edges 03/25/23 1646   Closure Approximated;Sutures 03/25/23 1646   Drainage Amount None 03/25/23 1646   Drainage Description Serosanguineous 03/24/23 1653   Treatments Cleansed;Site care 03/25/23 1646   Wound Cleansing Approved Wound Cleanser 03/25/23 1646   Periwound Protectant Not Applicable 03/25/23 1646   Dressing Cleansing/Solutions Not Applicable 03/25/23 1646   Dressing Options Ace Wrap;Dry Roll Gauze;Petrolatum Gauze (yellow) 03/25/23 1646   Dressing Changed Changed 03/25/23 1646   Dressing Status Clean;Dry;Intact 03/25/23 1646   Dressing Change/Treatment Frequency Daily, and As Needed  03/25/23 1646   NEXT Dressing Change/Treatment Date 03/26/23 03/25/23 1646   NEXT Weekly Photo (Inpatient Only) 03/27/23 03/25/23 1646     Vascular:    SUSHMA:   No results found.    Lab Values:    Lab Results   Component Value Date/Time    WBC 5.5 03/25/2023 05:21 AM    WBC 8.4 10/19/2010 12:05 PM    RBC 3.15 (L) 03/25/2023 05:21 AM    RBC 4.96 10/19/2010 12:05 PM    HEMOGLOBIN 9.2 (L) 03/25/2023 05:21 AM    HEMATOCRIT 28.9 (L) 03/25/2023 05:21 AM    CREACTPROT 18.81 (H) 02/28/2023 10:57 AM    SEDRATEWES 20 02/28/2023 10:57 AM    HBA1C 5.0 03/22/2023 05:26 AM        Culture Results show:  No results found for this or any previous visit (from the past 720 hour(s)).    Pain Level/Medicated:  Denies pain        INTERVENTIONS BY WOUND TEAM:  Chart and images reviewed. Discussed with bedside RN. All areas of concern (based on picture review, LDA review and discussion with bedside RN) have been thoroughly assessed. Documentation of areas based on significant findings. This RN in to assess patient. Performed standard wound care which includes appropriate positioning, dressing removal and non-selective debridement. Pictures and measurements obtained weekly if/when required.    RIGHT PLANTAR FOOT   Preparation for Dressing removal: NA, previous dressing removed  Non-selectively Debrided with:  wound cleanser and gauze.  Sharp debridement: NA  [place] wound: Cleansed with wound cleanser and gauze  Primary Dressing: betadine   Secondary (Outer) Dressing: silicone adhesive foam per patient request     ** LEFT BKA DRESSING WAS CHANGED BY FAITH DE LEON ON 3/25/23 **    Advanced Wound Care Discharge Planning  Number of Clinicians necessary to complete wound care: 1  Is patient requiring IV pain medications for dressing changes: No  Length of time for dressing change 15 min. (This does not include chart review, pre-medication time, set up, clean up or time spent charting.)    Interdisciplinary consultation: Patient, Bedside RN    EVALUATION  / RATIONALE FOR TREATMENT:  Most Recent Date:    3/26/23: Right plantar foot wound healed and with scar tissue present. Betadine applied to keep area clean and intact. Covered area with silicone adhesive foam (no allevyn heel dressing at this time) per patient preference as he does bear weight on that extremity during therapy. Dressing to left BKA incision was changed by primary RN. Any concerns regarding incisional dressing should be escalated to surgeon.      Goals: Steady decrease in wound area and depth weekly.    WOUND TEAM PLAN OF CARE ([X] for frequency of wound follow up,):   Nursing to follow dressing orders written for wound care. Contact wound team if area fails to progress, deteriorates or with any questions/concerns if something comes up before next scheduled follow up (See below as to whether wound is following and frequency of wound follow up)  Dressing changes by wound team:                   Follow up 3 times weekly:                NPWT change 3 times weekly:     Follow up 1-2 times weekly:      Follow up Bi-Monthly:           Follow up Monthly (High Risk):                        Follow up as needed:   X  Other (explain):     NURSING PLAN OF CARE ORDERS (X):  Dressing changes: See Dressing Care orders: X  Skin care: See Skin Care orders:   RN Prevention Protocol: X  Rectal tube care: See Rectal Tube Care orders:   Other orders:    RSKIN:   CURRENTLY IN PLACE (X), APPLIED THIS VISIT (A), ORDERED (O):   Q shift Nam:  X  Q shift pressure point assessments:  X    Surface/Positioning   Standard Mattress/Trauma Bed        X  Low Airloss          ICU Low Airloss   Bariatric STEPHANIE     Waffle cushion        Waffle Overlay          Reposition q 2 hours      TAPs Turning system     Z Odell Pillow     Offloading/Redistribution   Sacral Offloading Dressing (Silicone dressing)     Heel Offloading Dressing (Silicone dressing)       O  Heel float boots (Prevalon boot)             Float Heels off Bed with Pillows            Respiratory NA  Silicone O2 tubing         Gray Foam Ear protectors     Cannula fixation Device (Tender )          High flow offloading Clip    Elastic head band offloading device      Anchorfast                                                         Trach with Optifoam split foam             Containment/Moisture Prevention NA    Rectal tube or BMS    Purwick/Condom Cath        Rivera Catheter    Barrier wipes           Barrier paste       Antifungal tx      Interdry        Mobilization       Up to chair        Ambulate      PT/OT  X    Nutrition       Dietician        Diabetes Education      PO   X  TF     TPN     NPO   # days     Other        Anticipated discharge plans:   LTACH:        SNF/Rehab:         X         Home Health Care:           Outpatient Wound Center:            Self/Family Care:        Other:                  Vac Discharge Needs:   Vac Discharge plan is purely a recommendation from wound team and not a requirement for discharge unless otherwise stated by physician.  Not Applicable Pt not on a wound vac:     X  Regular Vac while inpatient, alternative dressing at DC:        Regular Vac in use and continued at DC:            Reg. Vac w/ Skin Sub/Biologic in use. Will need to be changed 2x wkly:      Veraflo Vac while inpatient, ok to transition to Regular Vac on Discharge (Bedside RN to Clamp small instillation tubing at time of DC):           Veraflo Vac while inpatient, would benefit from remaining on Veraflo Vac upon discharge:

## 2023-03-26 NOTE — THERAPY
"Physical Therapy   Daily Treatment     Patient Name: Richard Hubbard II  Age:  47 y.o., Sex:  male  Medical Record #: [id number]  Today's Date: 3/26/2023     Precautions  Precautions: Fall Risk, Non Weight Bearing Left Lower Extremity, Weight Bearing As Tolerated Right Lower Extremity, Immobilizer Left Lower Extremity  Comments: L BKA, R foot wound, RA - L shoulder and R wrist exacerbation    Subjective    Pt received up in , agreeable to participate. Pt's L BKA dressing appeared intact and pt reported that it was most recently changed last night.     Pt concerned about his R foot wound and whether he should bear weight through it. \"This is what happened to this leg too\" referring to wound on LLE prior to amputation. Provided emotional support/ encouragement. Discussed offloading shoe for R foot, pt reported that wife may bring it tmrw or \"soon.\"     Objective       03/26/23 1031   PT Charge Group   PT Therapeutic Exercise (Units) 3   PT Therapeutic Activities (Units) 1   PT Total Time Spent   PT Individual Total Time Spent (Mins) 60   Pain 0 - 10 Group   Location Shoulder;Wrist   Location Orientation Right;Left   Therapist Pain Assessment During Activity   Gait Functional Level of Assist    Gait Level Of Assist Unable to Participate   Wheelchair Functional Level of Assist   Wheelchair Assist Stand by Assist   Distance Wheelchair (Feet or Distance) 140  (from room>main gym)   Wheelchair Description Extra time;Leg rest management;Limited by fatigue;Supervision for safety  (BUE propulsion)   Stairs Functional Level of Assist   Level of Assist with Stairs Unable to Participate   Transfer Functional Level of Assist   Bed, Chair, Wheelchair Transfer Standby Assist  (after slideboard setup with VC for brakes and leg rest mgt)   Bed Chair Wheelchair Transfer Description Adaptive equipment;Increased time;Initial preparation for task;Slideboard transfer from bed to wheelchair;Set-up of equipment;Supervision for " "safety;Verbal cueing  (slideboard tx from wc<>mat and wc>bed)   Supine Lower Body Exercise   Other Exercises Sustained prone positioing x24 min. B knee bends 3x15, B hip ext 3x15 (RLE bent at knee with AAROM to achieve lift), B hip hike 1x15 on powderboard   Sitting Lower Body Exercises   Other Exercises Unsupported sitting EOM OH reach with BUE 2# med ball 3x10   Bed Mobility    Sit to Supine Standby Assist   Scooting Standby Assist  (wc and mat)   Rolling Standby Assist  (mat supine<>prone)   Interdisciplinary Plan of Care Collaboration   IDT Collaboration with  Physician   Patient Position at End of Therapy In Bed;Call Light within Reach;Tray Table within Reach;Phone within Reach   Collaboration Comments notified physician of pt request to discuss his R foot wound and concerns re: weightbearing through R foot         Assessment    Pt with good tolerance for session despite concerns/ anxiety about R foot wound. Remains motivated to participate doing extra reps of ea exercise. However pt presents with difficulty remembering steps for transfers (\"I just learned how to do this yesterday\" when setting up wc next to mat table), ie pt left residual limb rest in position prior to transferring requiring assist to remove it. Would benefit from cont practice with transfer setup to maximize safety and independence.    Strengths: Able to follow instructions, Effective communication skills, Good insight into deficits/needs, Independent prior level of function  Barriers: Fatigue, Generalized weakness, Home accessibility, Impaired activity tolerance, Impaired balance, Pain, Pain poorly managed, Pressure ulcer    Plan    Seated dynamic balance, BLE strengthening, prone lying 20-30minutes per day, HEP for LE strengthening, bed mobility, transfers slide board vs squat pivot, standing tolerance/balance with LRAD, w/c mobility/ramp negotiation, problem solve home entry (2 steps), contracture prevention, residual limb desensitization, " residual limb wrapping, Prosthetic and amputee education     Passport items to be completed:  Get in/out of bed safely, in/out of a vehicle, safely use mobility device, walk or wheel around home/community, navigate up and down stairs, show how to get up/down from the ground, ensure home is accessible, demonstrate HEP, complete caregiver training    Physical Therapy Problems (Active)       Problem: Mobility       Dates: Start: 03/22/23         Goal: STG-Within one week, patient will propel wheelchair up incline/ramp with CGA.       Dates: Start: 03/22/23               Problem: Mobility Transfers       Dates: Start: 03/22/23         Goal: STG-Within one week, patient will perform bed mobility with SBA.       Dates: Start: 03/22/23            Goal: STG-Within one week, patient will transfer bed to chair with CGA and slide board vs squat pivot.       Dates: Start: 03/22/23               Problem: PT-Long Term Goals       Dates: Start: 03/22/23         Goal: LTG-By discharge, patient will propel wheelchair household distances with Mariano.        Dates: Start: 03/22/23            Goal: LTG-By discharge, patient will perform home exercise program independently.        Dates: Start: 03/22/23            Goal: LTG-By discharge, patient will transfer in/out of a car with supervision.        Dates: Start: 03/22/23            Goal: LTG-By discharge, patient will complete bed mobility with Mariano.        Dates: Start: 03/22/23            Goal: LTG-By discharge, patient will complete w/c to bed transfer using squat pivot vs slide board Mariano.        Dates: Start: 03/22/23

## 2023-03-27 NOTE — CARE PLAN
"  Problem: Pain - Standard  Goal: Alleviation of pain or a reduction in pain to the patient’s comfort goal  Note: Educate patient of non-pharmacological comfort measures: repositioning, relaxation/breathing technique, cold compress and activities. On scheduled pain medications.     Problem: Fall Risk - Rehab  Goal: Patient will remain free from falls  Note: Osiris Healy Fall risk Assessment Score: 14      Moderate fall risk Interventions  - Bed and strip alarm   - Yellow sign by the door   - Yellow wrist band \"Fall risk\"  - Room near to the nurse station  - Do not leave patient unattended in the bathroom  - Fall risk education provided           The patient is Stable - Low risk of patient condition declining or worsening    Shift Goals  Clinical Goals: safety, respiration monitoring  Patient Goals: pain control, rest,        "

## 2023-03-27 NOTE — DIETARY
Nutrition Services Brief Update:    Day 6 of admit.  Richard Hubbard II is a 47 y.o. male with admitting DX of Sepsis (HCC) [A41.9]  S/P BKA (below knee amputation) unilateral, left (HCC) [Z89.512]    Current Diet: regular    Recorded PO is 50-75% to % w/ avg of 71%. Per NR's note in [employer], pt refused one afternoon snack due to late lunch.     Problem: Nutritional:  Goal: Achieve adequate nutritional intake  Description: Patient will consume >75% of meals  Outcome: progressing    RD following.

## 2023-03-27 NOTE — PROGRESS NOTES
"  Physical Medicine & Rehabilitation Progress Note    Encounter Date: 3/27/2023    Chief Complaint: Decreased mobility, leg pain    Interval Events (Subjective):  Patient sitting up in room. He reports his wife will bring in his equipment for his foot but that his family is having surgery today so it may be delayed. He is concerned about weight bearing on his right foot injury. Discussed no precautions but to monitor the wound. He reports he will have follow-up with surgeon to monitor it as well. He reports pain control is improved. Denies NVD.     _____________________________________  Interdisciplinary Team Conference   Most recent IDT on 3/23/2023    Discharge Date/Disposition:  4/11/23  _____________________________________      Objective:  VITAL SIGNS: /64   Pulse 65   Temp 36.7 °C (98 °F) (Oral)   Resp 16   Ht 1.8 m (5' 10.87\")   Wt 95.8 kg (211 lb 3.2 oz)   SpO2 96% Comment: 96  BMI 29.57 kg/m²   Gen: NAD  Psych: Mood and affect appropriate  CV: RRR, 1+ LLE edema  Resp: CTAB, no upper airway sounds  Abd: NTND  Neuro: AOx4, 4/5 L KE  Unchanged from 3/26/23    Laboratory Values:  Recent Results (from the past 72 hour(s))   CBC WITHOUT DIFFERENTIAL    Collection Time: 03/25/23  5:21 AM   Result Value Ref Range    WBC 5.5 4.8 - 10.8 K/uL    RBC 3.15 (L) 4.70 - 6.10 M/uL    Hemoglobin 9.2 (L) 14.0 - 18.0 g/dL    Hematocrit 28.9 (L) 42.0 - 52.0 %    MCV 91.7 81.4 - 97.8 fL    MCH 29.2 27.0 - 33.0 pg    MCHC 31.8 (L) 33.7 - 35.3 g/dL    RDW 50.2 (H) 35.9 - 50.0 fL    Platelet Count 344 164 - 446 K/uL    MPV 8.8 (L) 9.0 - 12.9 fL   RETICULOCYTES COUNT    Collection Time: 03/27/23  5:14 AM   Result Value Ref Range    Reticulocyte Count 2.4 (H) 0.8 - 2.1 %    Retic, Absolute 0.08 (H) 0.04 - 0.06 M/uL    Imm. Reticulocyte Fraction 16.4 9.3 - 17.4 %    Retic Hgb Equivalent 30.5 29.0 - 35.0 pg/cell   IRON/TOTAL IRON BIND    Collection Time: 03/27/23  5:14 AM   Result Value Ref Range    Iron 36 (L) 50 - 180 " ug/dL    Total Iron Binding 163 (L) 250 - 450 ug/dL    Unsat Iron Binding 127 110 - 370 ug/dL    % Saturation 22 15 - 55 %   Comp Metabolic Panel    Collection Time: 03/27/23  5:14 AM   Result Value Ref Range    Sodium 137 135 - 145 mmol/L    Potassium 4.1 3.6 - 5.5 mmol/L    Chloride 104 96 - 112 mmol/L    Co2 27 20 - 33 mmol/L    Anion Gap 6.0 (L) 7.0 - 16.0    Glucose 78 65 - 99 mg/dL    Bun 9 8 - 22 mg/dL    Creatinine 0.49 (L) 0.50 - 1.40 mg/dL    Calcium 8.5 8.5 - 10.5 mg/dL    AST(SGOT) 17 12 - 45 U/L    ALT(SGPT) 11 2 - 50 U/L    Alkaline Phosphatase 314 (H) 30 - 99 U/L    Total Bilirubin 0.3 0.1 - 1.5 mg/dL    Albumin 2.5 (L) 3.2 - 4.9 g/dL    Total Protein 7.4 6.0 - 8.2 g/dL    Globulin 4.9 (H) 1.9 - 3.5 g/dL    A-G Ratio 0.5 g/dL   ESTIMATED GFR    Collection Time: 03/27/23  5:14 AM   Result Value Ref Range    GFR (CKD-EPI) 128 >60 mL/min/1.73 m 2   CORRECTED CALCIUM    Collection Time: 03/27/23  5:14 AM   Result Value Ref Range    Correct Calcium 9.7 8.5 - 10.5 mg/dL       Medications:  Scheduled Medications   Medication Dose Frequency    nicotine  7 mg Daily-0600    multivitamin  1 Tablet DAILY    morphine  30 mg TID    senna-docusate  2 Tablet BID    acetaminophen  650 mg Q6HRS    calcium citrate  950 mg DAILY    DULoxetine  60 mg DAILY    morphine ER  30 mg Q12HRS    omeprazole  40 mg DAILY    pregabalin  150 mg TID    thiamine  100 mg DAILY    [START ON 3/28/2023] vitamin D2 (Ergocalciferol)  50,000 Units Q7 DAYS     PRN medications: capsaicin, Respiratory Therapy Consult, hydrALAZINE, acetaminophen, senna-docusate **AND** polyethylene glycol/lytes **AND** magnesium hydroxide **AND** bisacodyl, mag hydrox-al hydrox-simeth, ondansetron **OR** ondansetron, traZODone, sodium chloride, carboxymethylcellulose, methocarbamol, mirtazapine, morphine, zolpidem    Diet:  Current Diet Order   Procedures    Diet Order Diet: Regular       Medical Decision Making and Plan:   CHELI PIKE - Patient with osteomyelitis  while on DMARD for RA with multiple foot wounds. Patient is s/p left BKA on 3/9/23 with Dr. Chambers. NWB LLE. Needs IPOP vs extension brace.   -PT and OT for mobility and ADLs. Per guidelines, 15 hours per week between PT, OT and/or SLP.  -Follow-up Dr. Reyes CARVAJAL - Previously on medication. Currently on hold.      Tobacco Abuse - Patient on nicotine patch. Counseled      Anemia - Check AM CBC - 9.0 on admission, will monitor. Recheck 2/27 with Fe panel. Fe panel - low Iron. Start supplement     Hyponatremia - Check AM CMP - 132, will continue to monitor. Recheck 2/27 - 137, improved     Hypocalcemia - Check AM CMP - 8.3 on admission     Morbid obesity due to excess calories - BMI of 35.3 on admission, meets medical criteria. Dietitian to consult     Neuropathy - Patient on Lyrica TID. Continue Lyrica at current dose     Pain - Patient on Morphine ER, Morphine IR PRN, Tylenol, and Lyrica  -Schedule Morphine , 1130, and 1500. Continue scheduled Morphine IR and ER as well as lyrica. Will recheck Cr 2/27 - stable Cr at 0.49     Skin - Patient at risk for skin breakdown due to debility in areas including sacrum, achilles, elbows and head in addition to other sites. Nursing to assess skin daily.      GI Ppx - Patient on Prilosec for GERD prophylaxis. Patient on Senna-docusate for constipation prophylaxis.   Last BM: 03/19/23     DVT Ppx - Patient refusing AC on transfer. Patient reports he is aware of risks.  ____________________________________    T. Loi Pisano MD./PhD.  [facility] - Physical Medicine & Rehabilitation   [facility] - Brain Injury [facility]   ____________________________________    I had a 5 minute discussion with patient on 3/27/2023 about smoking cessation.  Discussed that smoking is associated with respiratory, cardiovascular, oncologic, and neurologic illnesses.  Patient was provided advice and counseling on different methods of smoking cessation as well as provided supportive listening for  psychologic aspect of addiction.

## 2023-03-27 NOTE — THERAPY
Physical Therapy   Daily Treatment     Patient Name: Richard Hubbard II  Age:  47 y.o., Sex:  male  Medical Record #: [id number]  Today's Date: 3/27/2023     Precautions  Precautions: (P) Fall Risk, Non Weight Bearing Left Lower Extremity, Weight Bearing As Tolerated Right Lower Extremity  Comments: (P) L BKA, R foot wound, RA - L shoulder and R wrist exacerbation. Rwrist brace, L knee emobilizer    Subjective    Patient seen twice this morning (8:30 and 10:30am), agreeable to physical therapy both sessions.      Objective       03/27/23 0831 03/27/23 1031   Precautions   Precautions Fall Risk;Non Weight Bearing Left Lower Extremity;Weight Bearing As Tolerated Right Lower Extremity Fall Risk;Non Weight Bearing Left Lower Extremity;Weight Bearing As Tolerated Right Lower Extremity   Comments L BKA, R foot wound, RA - L shoulder and R wrist exacerbation. R wrist brace, L knee immobilizer as needed L BKA, R foot wound, RA - L shoulder and R wrist exacerbation. Rwrist brace, L knee emobilizer   Wheelchair Functional Level of Assist   Wheelchair Assist Stand by Assist Stand by Assist   Distance Wheelchair (Feet or Distance) 140  (Room to main gym) 250  (room > back gym)   Wheelchair Description Extra time;Supervision for safety;Verbal cueing  (BUE propulsion) Extra time;Supervision for safety;Verbal cueing  (BUE propulsion, cueing for sequencing for W/C management/safety)   Transfer Functional Level of Assist   Bed, Chair, Wheelchair Transfer Contact Guard Assist Contact Guard Assist   Bed Chair Wheelchair Transfer Description Increased time;Supervision for safety;Verbal cueing  (Squat pivot from therapy mat <> w/c <> EOB) Increased time;Squat pivot transfer to wheelchair;Supervision for safety;Verbal cueing  (EOB <> W/C <> therapy mat)   Supine Lower Body Exercise   Short Arc Quad 2 sets of 10  --    Other Exercises Supine SLR stretch  (1 minute each side) Sustained prone position - 25 minutes. B knee flexion 2 x 15  "(RLE with 4lb ankle weight, LLE AROM), 1 x 10 glute set with 5 second hold.   Bed Mobility    Supine to Sit Standby Assist  (Cues for sequencing) Standby Assist  (cues for LUE placement to reduce shoulder pain)   Sit to Supine Standby Assist  (Cues for sequencing) Standby Assist   Sit to Stand  --  Minimal Assist  (to CGA)   Scooting Standby Assist  (Verbal cues for head hips relationship for efficiency) Standby Assist  (Cues for head/hips relationship and anterior trunk lean)   Interdisciplinary Plan of Care Collaboration   IDT Collaboration with   --  Physician   Patient Position at End of Therapy In Bed;Bed Alarm On;Call Light within Reach;Tray Table within Reach;Phone within Reach In Bed;Bed Alarm On;Call Light within Reach;Tray Table within Reach;Phone within Reach   Collaboration Comments  --  CLOF, POC     8:30AM:    - SLR supine assessment showing approximately 80* hamstring length bilaterally. PROM to stretch hamstrings 1 x 1 minute bilaterally.    - Supine therex SAQ, as above    - Performed squat pivot transfers from w/c <> therapy mat, CGA; required verbal cues for using head-hips relationship effectively.    - Seated dynamic balance: seated at edge of mat reaching for cones laterally and placement laterally on opposite side of patient, focus on reaching outside ABHISHEK and weight shifting. SBA, 1 x 8 cones stacking, bilateral.    - Rewrapped residual limb due to loose ace wrap.    - Educated patient on use of seatbelt while in w/c and changes in COM with amputation.     10:30AM:    - Adjusted patient residual limb w/c rest to extend out further in order to improve knee extension support.    - Attempted sit to  parallel bars, patient unable to complete without MaxA. Transitioned to therapy mat where patient performed 1 x 5 sit to stands with BUE support on FWW and Adore to CGA, with decreasing table height 2\" with each repetition. Assistance required for FWW stabilization and cues for anterior weight " shift.    - Hip flexor length assessment bilaterally in sugar test position with BLE WNL.    - Prone therex as above.    - Rewrapped residual limb again due to curling of ace wrap behind the knee.       Assessment    Patient tolerated treatment well in both sessions. Patient has improved independence with transfers using squat pivot technique as he doesn't have to manage clothing/skin as much, continues to require verbal cues for anterior weight shift with scooting and head hips relationship to improve efficiency and prevent shearing on wheelchair and table. Patient has improving recall of wheelchair management and sequencing, requiring fewer cues for chair set up with transfers. Performs seated dynamic balance activities with increased BUE support on tables in order to achieve full weight shift; would benefit from practice requiring increased use of obliques to reach outside ABHISHEK and cues not to use BUE. Demonstrated improved sit to stand mechanics throughout intervention, requiring less assistance and good trunk control, however patient continues to require elevated surface for practice and stabilization of FWW for balance. Patient shows increased activity tolerance and decreased R knee pain with activity. Rewrapping residual limb multiple times today to improve compression and limit posterior side of wrap from rolling.      Strengths: Able to follow instructions, Effective communication skills, Good insight into deficits/needs, Independent prior level of function  Barriers: Fatigue, Generalized weakness, Home accessibility, Impaired activity tolerance, Impaired balance, Pain, Pain poorly managed, Pressure ulcer    Plan    Seated dynamic balance, BLE strengthening, prone lying 20-30minutes per day, HEP for LE strengthening, bed mobility, transfers slide board vs squat pivot, standing tolerance/balance with LRAD, w/c mobility/ramp negotiation, problem solve home entry (2 steps), contracture prevention, residual  limb desensitization, residual limb wrapping, Prosthetic and amputee education     Passport items to be completed:  Get in/out of bed safely, in/out of a vehicle, safely use mobility device, walk or wheel around home/community, navigate up and down stairs, show how to get up/down from the ground, ensure home is accessible, demonstrate HEP, complete caregiver training    Physical Therapy Problems (Active)       Problem: Mobility       Dates: Start: 03/22/23         Goal: STG-Within one week, patient will propel wheelchair up incline/ramp with CGA.       Dates: Start: 03/22/23               Problem: Mobility Transfers       Dates: Start: 03/22/23         Goal: STG-Within one week, patient will perform bed mobility with SBA.       Dates: Start: 03/22/23            Goal: STG-Within one week, patient will transfer bed to chair with CGA and slide board vs squat pivot.       Dates: Start: 03/22/23               Problem: PT-Long Term Goals       Dates: Start: 03/22/23         Goal: LTG-By discharge, patient will propel wheelchair household distances with Mariano.        Dates: Start: 03/22/23            Goal: LTG-By discharge, patient will perform home exercise program independently.        Dates: Start: 03/22/23            Goal: LTG-By discharge, patient will transfer in/out of a car with supervision.        Dates: Start: 03/22/23            Goal: LTG-By discharge, patient will complete bed mobility with Mariano.        Dates: Start: 03/22/23            Goal: LTG-By discharge, patient will complete w/c to bed transfer using squat pivot vs slide board Mariano.        Dates: Start: 03/22/23

## 2023-03-27 NOTE — THERAPY
Occupational Therapy  Daily Treatment     Patient Name: Richard Hubbard II  Age:  47 y.o., Sex:  male  Medical Record #: [id number]  Today's Date: 3/27/2023     Precautions  Precautions: (P) Fall Risk, Non Weight Bearing Left Lower Extremity, Weight Bearing As Tolerated Right Lower Extremity  Comments: (P) L BKA, R foot wound, RA - L shoulder and R wrist exacerbation. Rwrist brace, L knee emobilizer    Subjective    Patient agreeable to participate in both OT sessions w/ focus on ADLs, IADLs and progression with standing balance/tolerance.      Objective     03/27/23 0931 03/27/23 1401   OT Charge Group   OT Self Care / ADL (Units)  --  4   OT Therapy Activity (Units) 1  --    OT Therapeutic Exercise (Units) 1  --    OT Total Time Spent   OT Individual Total Time Spent (Mins) 30 60   Precautions   Precautions Fall Risk;Non Weight Bearing Left Lower Extremity;Weight Bearing As Tolerated Right Lower Extremity Fall Risk;Non Weight Bearing Left Lower Extremity;Weight Bearing As Tolerated Right Lower Extremity   Comments L BKA, R foot wound, RA - L shoulder and R wrist exacerbation. Rwrist brace, L knee emobilizer L BKA, R foot wound, RA - L shoulder and R wrist exacerbation. Rwrist brace, L knee emobilizer   Cognition    Level of Consciousness Alert  --    Sleep/Wake Cycle   Sleep & Rest Awake Awake   Functional Level of Assist   Grooming  --  Modified Independent  (to wash hands while seated)   Toileting  --  Contact Guard Assist  (urinated while seated on padded drop arm commode over toilet)   Toilet Transfers  --  Contact Guard Assist  (completed toilet txfr x 2 using txfr board (1st trial w/ bariatric commode over toilet, 2nd trial w/ padded drop arm commode over toilet))   Sitting Upper Body Exercises   Tricep Press   (Rickshaw: 3 sets x 10 forward (1 set x 20#, 2 sets x 30#), 1 set x 10 backward (25#))  --    IADL Treatments   IADL Treatments  --  Home management   Home Management  --  see note below re: IADL  performance details   Interdisciplinary Plan of Care Collaboration   Patient Position at End of Therapy Seated;Call Light within Reach Seated;Call Light within Reach     Discussed DME options to facilitate independence with toileting tasks. Patient completed 1st toilet txfr w/bariatric commode over toilet and 2nd txfr w/ padded drop arm commode over toilet (both txfrs completed with CGA and txfr board). When seated on padded drop arm commode, patient completed clothing management tasks using lateral weight shifting technique and able to urinate (despite concerns re: not being positioned far back enough on commode).     IADLs: Patient placed detergent into washer while seated in w/c, therapist assisted with placing laundry in washer and patient able to start wash cycle while standing @ min to CGA level (w/ heavy UE support). Mod to max A required for STS from w/c w/ FWW.     Assessment    Patient tolerated OT sessions well with focus on problem solving toileting tasks, UB strengthening and standing balance/tolerance. Demo's improved functional independence with toileting pursuits and will benefit from additional practice to maximize carryover/independence/safety. Patient requires heavy BUE support when standing to complete laundry tasks. Expressed that he prefers to use padded drop arm commode vs bariatric commode over toilet. Very pleasant and motivated to participate to the best of his abilities.   Strengths: Able to follow instructions, Alert and oriented, Effective communication skills, Good insight into deficits/needs, Independent prior level of function, Motivated for self care and independence, Pleasant and cooperative, Supportive family, Willingly participates in therapeutic activities  Barriers: Decreased endurance, Fatigue, Generalized weakness, Home accessibility, Impaired activity tolerance, Impaired balance, Limited mobility, Pain, Pain poorly managed    Plan    W/c<>drop arm commode over toilet  transfer, BUE strengthening within reduced pain ranges, sitting balance       Occupational Therapy Goals (Active)       Problem: Bathing       Dates: Start: 03/22/23         Goal: STG-Within one week, patient will bathe at CGA level using DME/AE PRN.       Dates: Start: 03/22/23               Problem: Dressing       Dates: Start: 03/22/23         Goal: STG-Within one week, patient will dress LB at Mod A level using DME/AE PRN.       Dates: Start: 03/22/23               Problem: Functional Transfers       Dates: Start: 03/22/23         Goal: STG-Within one week, patient will transfer to toilet at Min A level using DME/AE PRN.       Dates: Start: 03/22/23               Problem: OT Long Term Goals       Dates: Start: 03/22/23         Goal: LTG-By discharge, patient will complete basic self care tasks at Min A-Mod I level using DME/AE PRN.       Dates: Start: 03/22/23            Goal: LTG-By discharge, patient will perform bathroom transfers at SBA-Mod I level using DME/AE PRN.       Dates: Start: 03/22/23            Goal: LTG-By discharge, patient will complete basic home management at Min A-Mod I level using DME/AE PRN.       Dates: Start: 03/22/23               Problem: Toileting       Dates: Start: 03/22/23         Goal: STG-Within one week, patient will complete toileting tasks at Max A level using DME/AE PRN.       Dates: Start: 03/22/23

## 2023-03-27 NOTE — THERAPY
Recreational Therapy   Initial Evaluation     Patient Name: Richard Hubbard II  Age:  47 y.o., Sex:  male  Medical Record #: [id number]  Today's Date: 3/27/2023     Subjective    Patient was ready and agreeable to recreation therapy evaluation.     Objective       03/27/23 1531   Procedural Tracking   Procedural Tracking Community Re-Integration;Leisure Skills Awareness;Community Skills Development;Leisure Skills Development;Social Skills Training;Cognitive Skills Training;Gross Motor Functional Leisure Skills;Fine Motor Functional Leisure Skills;Group Treatment   Treatment Time   Total Time Spent (mins) 30   Total Time Missed 0   Leisure History   Leisure Interests Family;Hobbies;Card  (wood working, games)   Pre-Morbid Leisure Lifestyle Individual;Occasionally Active   Prior Living Arrangements   Lives with - Patient's Self Care Capacity Spouse;Child Less than 18 Years of Age   Steps Into Home 2   Steps In Home 0   Ambulation Independent   Assistive Devices Used 4-Wheel Walker   Driving / Transportation Driving Independent   Functional Ability Status - Physical   Endurance Low   Right Leg Weak   Left Leg Weak   Functional Ability Status - Cognitive   Attention Span Remains on Task   Comprehension Follows Two Step Commands;Follows One Step Commands   Judgment Able to Make Independent Decisions   Functional Ability Status - Emotional    Affect Appropriate   Mood Appropriate;Depressed   Behavior Appropriate;Cooperative   Leisure Competence Measure   Leisure Awareness Minimal Assist   Leisure Attitude Minimal Assist   Leisure Skills Minimal Assist   Cultural / Social Behaviors Minimal Assist   Interpersonal Skills Minimal Assist   Community Integration Skills Minimal Assist   Social Contact Minimal Assist   Community Participation Minimal Assist   Clinical Impression   Clinical Impression Impaired Fine Motor Leisure Functioning;Impaired Gross Motor Leisure Functioning;Impaired Endurance;Impaired Leisure  Skills;Impaired Relaxation and Coping Skills;Impaired Community Skills   Current Discharge Plan   Current Discharge Plan Return to Prior Living Situation   Benefit    Benefit Patient would Benefit from Inpatient Recreational Therapy to Maximize Independent Leisure Functioning    Interdisciplinary Plan of Care Collaboration   Patient Position at End of Therapy Seated;Phone within Reach;Call Light within Reach;Tray Table within Reach   Strengths & Barriers   Strengths Able to follow instructions;Alert and oriented;Willingly participates in therapeutic activities;Supportive family;Pleasant and cooperative;Motivated for self care and independence   Barriers Decreased endurance;Fatigue;Generalized weakness;Impaired activity tolerance;Lack of motivation;Limited mobility;Pain         Assessment  Patient is 47 y.o. male with a diagnosis of S/P BKA unilateral Left.  Additional factors influencing patient status / progress (ie: cognitive factors, co-morbidities, social support, etc): past medical history of reported arthritis on Humira with deformities and foot ulcers;  who presented on 2/28/2023 10:51 AM with MRI evidence of osteomyelitis of the left navicular bone fever of 103, hypoglycemia, altered mental status and cellulitis. .        Plan  Recommend Recreational Therapy 30-60 minutes per day 3-4 days per week for 15 days for the following treatments:  Community Re-Integration, Community Skills Development, Leisure Skills Awareness, Leisure Skills Development, Social Skills Training, Cognitive Skills Training, Gross Motor Functional Leisure Skills, Fine Motor Functional Leisure Skills, and Group Treatment    Passport items to be completed:  Verbalize two positive leisure activities, discuss returning to work, hobbies, community groups or volunteer activities, explore community resources     Goals:  Long term and short term goals have been discussed with patient and they are in agreement.    Recreation Therapy Problems  (Active)       Problem: Recreation Therapy       Dates: Start: 03/27/23         Goal: STG-Within one week, patient will identify two new leisure activities.        Dates: Start: 03/27/23            Goal: STG-Within one week, patient will identify adaptations for previous leisure skills.        Dates: Start: 03/27/23            Goal: LTG-By discharge, patient will identify three new leisure activities.        Dates: Start: 03/27/23            Goal: LTG-By discharge, patient will identify and demonstrate adaptations for previous leisure skills.        Dates: Start: 03/27/23

## 2023-03-28 NOTE — THERAPY
Occupational Therapy  Daily Treatment     Patient Name: Richard Hubbard II  Age:  47 y.o., Sex:  male  Medical Record #: [id number]  Today's Date: 3/28/2023     Precautions  Precautions: Fall Risk, Non Weight Bearing Left Lower Extremity, Weight Bearing As Tolerated Right Lower Extremity  Comments: L BKA, R foot wound, RA - L shoulder and R wrist exacerbation. Rwrist brace, L knee emobilizer         Subjective    Pt seen 2x this day, and agreeable to both sessions as listed and detailed below.     Objective       03/28/23 1031 03/28/23 1401   OT Charge Group   OT Therapy Activity (Units) 3 2   OT Therapeutic Exercise (Units) 1  --    OT Total Time Spent   OT Individual Total Time Spent (Mins) 60 30   Pain   Intervention Medication (see MAR)  --    Pain 0 - 10 Group   Location Generalized  --    Location Orientation Right;Left;Anterior;Posterior  --    Description Aching  --    Comfort Goal Perform Activity;Comfort with Movement  --    Therapist Pain Assessment During Activity  --    Functional Level of Assist   Bed, Chair, Wheelchair Transfer Contact Guard Assist Contact Guard Assist   Bed Chair Wheelchair Transfer Description Increased time;Squat pivot transfer to wheelchair;Supervision for safety;Verbal cueing  (w/c <> therapy mat) Increased time;Squat pivot transfer to wheelchair;Supervision for safety;Verbal cueing  (w/c <> EOB)   Toilet Transfers  --  Contact Guard Assist   Toilet Transfer Description  --  Adaptive equipment;Set-up of equipment;Supervision for safety;Verbal cueing;Increased time  (lateral scoot w/c<>padded commode over toilet)   Tub / Shower Transfers  --  Contact Guard Assist   Tub Shower Transfer Description  --  Adaptive equipment;Shower bench;Increased time;Initial preparation for task;Supervision for safety;Verbal cueing;Set-up of equipment  (CGA for w/c during lateral scoot transfer w/c<>tub transfer bench with dry transfer.)   Sitting Upper Body Exercises   Sitting Upper Body  Exercises Yes  --    Chest Press 3 sets of 10;Bilateral;Weight (See Comments for lbs)  (3# dowel)  --    Front Arm Raise 3 sets of 10;Bilateral;Weight (See Comments for lbs)  (3# dowel)  --    Bilateral Row 3 sets of 10;Bilateral;Weight (See Comments for lbs)  (3# dowel with added pink therapy band for resistance.)  --    Comments Completed with cues for upright posture and several breaks required.  --    Balance   Skilled Intervention Verbal Cuing  --    Comments Pt engaged in seated core strengthening using weighted dowel and therapist resistance in all planes for 5-8 second holds with pt able to maintain balance and engage core.  --    Bed Mobility    Scooting Standby Assist  --    Skilled Intervention Verbal Cuing;Sequencing  (Pt engaged in bilateral scooting EOM with emphasis on lifting rear at least 1 inch from table surface. He completed 5x.)  --    Interdisciplinary Plan of Care Collaboration   IDT Collaboration with  Physical Therapist;Nursing  --    Patient Position at End of Therapy Seated;Chair Alarm On;Self Releasing Lap Belt Applied;Call Light within Reach;Tray Table within Reach In Bed;Bed Alarm On;Call Light within Reach;Tray Table within Reach   Collaboration Comments CLOF, rashing on abdomen and thigh  --      Requested measurements and pictures of pt's home, provided a list for reference. Pt plans to have his wife complete tomorrow. Following dry tub transfer, pt receptive to recommendations for Tub transfer bench in his kids BR.    Assessment    Pt making good progress towards goals with functional transfers this session, achieving CGA without SB for toilet and tub. He continues to be limited by L-shoulder pain and R-wrist, but while wearing wrist brace, has reported reduced pain with movement.   Strengths: Able to follow instructions, Alert and oriented, Effective communication skills, Good insight into deficits/needs, Independent prior level of function, Motivated for self care and  independence, Pleasant and cooperative, Supportive family, Willingly participates in therapeutic activities  Barriers: Decreased endurance, Fatigue, Generalized weakness, Home accessibility, Impaired activity tolerance, Impaired balance, Limited mobility, Pain, Pain poorly managed    Plan    Simulate toilet setup on L-side for home, BUE strengthening within reduced pain ranges, sitting balance    Passport items to be completed:  Perform bathroom transfers, complete dressing, complete feeding, get ready for the day, prepare a simple meal, participate in household tasks, adapt home for safety needs, demonstrate home exercise program, complete caregiver training     Occupational Therapy Goals (Active)       Problem: Bathing       Dates: Start: 03/22/23         Goal: STG-Within one week, patient will bathe at CGA level using DME/AE PRN.       Dates: Start: 03/22/23               Problem: Dressing       Dates: Start: 03/22/23         Goal: STG-Within one week, patient will dress LB at Mod A level using DME/AE PRN.       Dates: Start: 03/22/23               Problem: Functional Transfers       Dates: Start: 03/22/23         Goal: STG-Within one week, patient will transfer to toilet at Min A level using DME/AE PRN.       Dates: Start: 03/22/23               Problem: OT Long Term Goals       Dates: Start: 03/22/23         Goal: LTG-By discharge, patient will complete basic self care tasks at Min A-Mod I level using DME/AE PRN.       Dates: Start: 03/22/23            Goal: LTG-By discharge, patient will perform bathroom transfers at SBA-Mod I level using DME/AE PRN.       Dates: Start: 03/22/23            Goal: LTG-By discharge, patient will complete basic home management at Min A-Mod I level using DME/AE PRN.       Dates: Start: 03/22/23               Problem: Toileting       Dates: Start: 03/22/23         Goal: STG-Within one week, patient will complete toileting tasks at Max A level using DME/AE PRN.       Dates: Start:  03/22/23

## 2023-03-28 NOTE — THERAPY
"Physical Therapy   Daily Treatment     Patient Name: Richard Hubbard II  Age:  47 y.o., Sex:  male  Medical Record #: [id number]  Today's Date: 3/28/2023     Precautions  Precautions: Fall Risk, Non Weight Bearing Left Lower Extremity, Weight Bearing As Tolerated Right Lower Extremity  Comments: L BKA, R foot wound, RA - L shoulder and R wrist exacerbation. Rwrist brace, L knee emobilizer    Subjective    Pt sitting edge of bed, ready for PT. \"They won't let me transfer by myself\"     Objective       03/28/23 0831   PT Charge Group   PT Therapeutic Exercise (Units) 1   PT Therapeutic Activities (Units) 1   PT Total Time Spent   PT Individual Total Time Spent (Mins) 30   Transfer Functional Level of Assist   Bed, Chair, Wheelchair Transfer Standby Assist   Bed Chair Wheelchair Transfer Description Adaptive equipment;Increased time;Slideboard transfer from bed to wheelchair;Set-up of equipment   Supine Lower Body Exercise   Bridges 2 sets of 15  (LLE propped on bolster)   Hip Adduction  2 sets of 15  (JONATHAN towel squeeze)   Gluteal Isometrics 2 sets of 15   Quadriceps Isometrics 2 sets of 15     Re-wrapped LLE residual limb. Reviewed desensitization to LLE and provided light touch/ deep pressure to limb as tolerated.    Assessment    Pt tolerated transfers wc<>bed/ mat table with and without slide board with SBA, tolerated strength training well, requires reinforcement for residual limb desensitization / ace wrapping and skin checks during dressing changes.    Strengths: Able to follow instructions, Effective communication skills, Good insight into deficits/needs, Independent prior level of function  Barriers: Fatigue, Generalized weakness, Home accessibility, Impaired activity tolerance, Impaired balance, Pain, Pain poorly managed, Pressure ulcer    Plan    Seated dynamic balance, BLE strengthening, prone lying 20-30minutes per day, HEP for LE strengthening, bed mobility, transfers slide board vs squat pivot, " standing tolerance/balance with LRAD, w/c mobility/ramp negotiation, problem solve home entry (2 steps), contracture prevention, residual limb desensitization, residual limb wrapping, Prosthetic and amputee education     Passport items to be completed:  Get in/out of bed safely, in/out of a vehicle, safely use mobility device, walk or wheel around home/community, navigate up and down stairs, show how to get up/down from the ground, ensure home is accessible, demonstrate HEP, complete caregiver training      Physical Therapy Problems (Active)       Problem: Mobility       Dates: Start: 03/22/23         Goal: STG-Within one week, patient will propel wheelchair up incline/ramp with CGA.       Dates: Start: 03/22/23               Problem: Mobility Transfers       Dates: Start: 03/22/23         Goal: STG-Within one week, patient will perform bed mobility with SBA.       Dates: Start: 03/22/23            Goal: STG-Within one week, patient will transfer bed to chair with CGA and slide board vs squat pivot.       Dates: Start: 03/22/23               Problem: PT-Long Term Goals       Dates: Start: 03/22/23         Goal: LTG-By discharge, patient will propel wheelchair household distances with Mariano.        Dates: Start: 03/22/23            Goal: LTG-By discharge, patient will perform home exercise program independently.        Dates: Start: 03/22/23            Goal: LTG-By discharge, patient will transfer in/out of a car with supervision.        Dates: Start: 03/22/23            Goal: LTG-By discharge, patient will complete bed mobility with Mariano.        Dates: Start: 03/22/23            Goal: LTG-By discharge, patient will complete w/c to bed transfer using squat pivot vs slide board Mariano.        Dates: Start: 03/22/23

## 2023-03-28 NOTE — THERAPY
Physical Therapy   Daily Treatment     Patient Name: Richard Hubbard II  Age:  47 y.o., Sex:  male  Medical Record #: [id number]  Today's Date: 3/28/2023     Precautions  Precautions: (P) Fall Risk, Non Weight Bearing Left Lower Extremity, Weight Bearing As Tolerated Right Lower Extremity  Comments: (P) L BKA, R foot wound, RA - L shoulder and R wrist exacerbation. Rwrist brace, L knee emobilizer    Subjective    Patient seated in w/c in room, agreeable to therapy session.     Objective       03/28/23 0931   Precautions   Precautions Fall Risk;Non Weight Bearing Left Lower Extremity;Weight Bearing As Tolerated Right Lower Extremity   Comments L BKA, R foot wound, RA - L shoulder and R wrist exacerbation. Rwrist brace, L knee emobilizer   Wheelchair Functional Level of Assist   Wheelchair Assist Stand by Assist   Distance Wheelchair (Feet or Distance) 250   Wheelchair Description Extra time;Leg rest management;Limited by fatigue;Verbal cueing  (BUE propulsion)   Transfer Functional Level of Assist   Bed, Chair, Wheelchair Transfer Contact Guard Assist   Bed Chair Wheelchair Transfer Description Increased time;Squat pivot transfer to wheelchair;Supervision for safety;Verbal cueing  (w/c <> therapy mat)   Supine Lower Body Exercise   Other Exercises Sustained prone position - 25 minutes. B knee flexion 2 x 15 (RLE with 5lb ankle weight, LLE AROM), see additional exercise notes below.   Bed Mobility    Supine to Sit Standby Assist   Sit to Supine Standby Assist   Interdisciplinary Plan of Care Collaboration   IDT Collaboration with  Physician;Physical Therapist   Patient Position at End of Therapy Seated;Chair Alarm On;Self Releasing Lap Belt Applied;Other (Comments)  (Hand off to OT)   Collaboration Comments CLOF, POC, Handoff to OT     Seated postural and scapular stabilization exercises at edge of mat with Supervision, consistent verbal cues to maintain upright posture throughout interventions:    - BUE abduction  "with pink resistance band - 2 x 10    - BUE ER with pink resistance band - 2 x 10    - BUE \"X's\" (shoulder flexion R/extension L and vice versa) with pink resistance band - 2 x 10. Tactile faciltation for L scapular retraction and assistance for L shoulder flexion.     Prone lying posterior chain strengthening on therapy mat, Supervision:   - Unilateral UE shoulder flexion and contralateral LE hip extension, lifting off table, \"Half superman\" - 2 x 10   - All extremities lift off table, \"superman\" - 2 x 10   - Required intermittent verbal and tactile cues for abdominal activation, B shoulder retraction, and B hip extension.     Wheelchair modification: residual limb leg rest replaced with knee extension leg rest to support longer residual limb. Padded with 2 pillow for improved knee and hip alignment.     Assessment    Patient tolerated treatment well. Modified patient residual limb rest for improved knee and hip alignment for contracture prevention. Performed prone and seated therex with improved motor control for B scapular retraction and overall postural endurance during activity. Patient continues to require verbal cues for AD management/sequencing.     Strengths: Able to follow instructions, Effective communication skills, Good insight into deficits/needs, Independent prior level of function  Barriers: Fatigue, Generalized weakness, Home accessibility, Impaired activity tolerance, Impaired balance, Pain, Pain poorly managed, Pressure ulcer    Plan    Seated dynamic balance, BLE strengthening, prone lying 20-30minutes per day, HEP for LE strengthening, bed mobility, transfers slide board vs squat pivot, standing tolerance/balance with LRAD, w/c mobility/ramp negotiation, problem solve home entry (2 steps), contracture prevention, residual limb desensitization, residual limb wrapping, Prosthetic and amputee education     Passport items to be completed:  Get in/out of bed safely, in/out of a vehicle, safely use " mobility device, walk or wheel around home/community, navigate up and down stairs, show how to get up/down from the ground, ensure home is accessible, demonstrate HEP, complete caregiver training    Physical Therapy Problems (Active)       Problem: Mobility       Dates: Start: 03/22/23         Goal: STG-Within one week, patient will propel wheelchair up incline/ramp with CGA.       Dates: Start: 03/22/23               Problem: Mobility Transfers       Dates: Start: 03/22/23         Goal: STG-Within one week, patient will perform bed mobility with SBA.       Dates: Start: 03/22/23            Goal: STG-Within one week, patient will transfer bed to chair with CGA and slide board vs squat pivot.       Dates: Start: 03/22/23               Problem: PT-Long Term Goals       Dates: Start: 03/22/23         Goal: LTG-By discharge, patient will propel wheelchair household distances with Mariano.        Dates: Start: 03/22/23            Goal: LTG-By discharge, patient will perform home exercise program independently.        Dates: Start: 03/22/23            Goal: LTG-By discharge, patient will transfer in/out of a car with supervision.        Dates: Start: 03/22/23            Goal: LTG-By discharge, patient will complete bed mobility with Mariano.        Dates: Start: 03/22/23            Goal: LTG-By discharge, patient will complete w/c to bed transfer using squat pivot vs slide board Mariano.        Dates: Start: 03/22/23

## 2023-03-28 NOTE — CARE PLAN
The patient is Stable - Low risk of patient condition declining or worsening    Shift Goals  Clinical Goals: safety, respiration monitoring  Patient Goals: pain control, rest,    Progress made toward(s) clinical / shift goals:    Problem: Pain - Standard  Goal: Alleviation of pain or a reduction in pain to the patient’s comfort goal  Outcome: Progressing. Patient states having generalized achy pain to joint areas, pain 7-8/10. Patient receives scheduled morphine ER BID and has prn Morphine IR as needed for breakthrough pain.      Problem: Bladder / Voiding  Goal: Patient will establish and maintain regular urinary output  Outcome: Progressing. Patient voiding well over shift. Continent of bladder. Uses urinal while in bed. Calls appropriately to have staff empty urinal.

## 2023-03-28 NOTE — PROGRESS NOTES
Received shift report and assumed care of patient.  Patient awake, calm and stable, currently positioned in bed for comfort and safety; call light within reach.  6/10 generalized  pain at this time. Scheduled pain medications due.  Will continue to monitor.

## 2023-03-28 NOTE — PROGRESS NOTES
"  Physical Medicine & Rehabilitation Progress Note    Encounter Date: 3/28/2023    Chief Complaint: Decreased mobility, leg pain    Interval Events (Subjective):  Patient sitting up in therapy gym. He reports therapy is going well but his pain is severe at night. He reports it is shooting down his limb. He does not endorse phantom sensations but pain sounds neuropathic. Discussed can increase Night time dose but higher risk for side effects about 450 with maximum daily dose of 600. Will monitor labs.     _____________________________________  Interdisciplinary Team Conference   Most recent IDT on 3/23/2023    Discharge Date/Disposition:  4/11/23  _____________________________________      Objective:  VITAL SIGNS: /65   Pulse 60   Temp 37 °C (98.6 °F) (Temporal)   Resp 16   Ht 1.8 m (5' 10.87\")   Wt 95.8 kg (211 lb 3.2 oz)   SpO2 94%   BMI 29.57 kg/m²   Gen: NAD  Psych: Mood and affect appropriate  CV: RRR, 0 edema  Resp: CTAB, no upper airway sounds  Abd: NTND  Neuro: AOx4, following commands, able to extend left hip anti gravity briefly    Laboratory Values:  Recent Results (from the past 72 hour(s))   RETICULOCYTES COUNT    Collection Time: 03/27/23  5:14 AM   Result Value Ref Range    Reticulocyte Count 2.4 (H) 0.8 - 2.1 %    Retic, Absolute 0.08 (H) 0.04 - 0.06 M/uL    Imm. Reticulocyte Fraction 16.4 9.3 - 17.4 %    Retic Hgb Equivalent 30.5 29.0 - 35.0 pg/cell   IRON/TOTAL IRON BIND    Collection Time: 03/27/23  5:14 AM   Result Value Ref Range    Iron 36 (L) 50 - 180 ug/dL    Total Iron Binding 163 (L) 250 - 450 ug/dL    Unsat Iron Binding 127 110 - 370 ug/dL    % Saturation 22 15 - 55 %   Comp Metabolic Panel    Collection Time: 03/27/23  5:14 AM   Result Value Ref Range    Sodium 137 135 - 145 mmol/L    Potassium 4.1 3.6 - 5.5 mmol/L    Chloride 104 96 - 112 mmol/L    Co2 27 20 - 33 mmol/L    Anion Gap 6.0 (L) 7.0 - 16.0    Glucose 78 65 - 99 mg/dL    Bun 9 8 - 22 mg/dL    Creatinine 0.49 (L) " 0.50 - 1.40 mg/dL    Calcium 8.5 8.5 - 10.5 mg/dL    AST(SGOT) 17 12 - 45 U/L    ALT(SGPT) 11 2 - 50 U/L    Alkaline Phosphatase 314 (H) 30 - 99 U/L    Total Bilirubin 0.3 0.1 - 1.5 mg/dL    Albumin 2.5 (L) 3.2 - 4.9 g/dL    Total Protein 7.4 6.0 - 8.2 g/dL    Globulin 4.9 (H) 1.9 - 3.5 g/dL    A-G Ratio 0.5 g/dL   ESTIMATED GFR    Collection Time: 03/27/23  5:14 AM   Result Value Ref Range    GFR (CKD-EPI) 128 >60 mL/min/1.73 m 2   CORRECTED CALCIUM    Collection Time: 03/27/23  5:14 AM   Result Value Ref Range    Correct Calcium 9.7 8.5 - 10.5 mg/dL       Medications:  Scheduled Medications   Medication Dose Frequency    pregabalin  150 mg BID    pregabalin  200 mg QHS    nicotine  7 mg Daily-0600    multivitamin  1 Tablet DAILY    morphine  30 mg TID    senna-docusate  2 Tablet BID    acetaminophen  650 mg Q6HRS    calcium citrate  950 mg DAILY    DULoxetine  60 mg DAILY    morphine ER  30 mg Q12HRS    omeprazole  40 mg DAILY    thiamine  100 mg DAILY    vitamin D2 (Ergocalciferol)  50,000 Units Q7 DAYS     PRN medications: capsaicin, Respiratory Therapy Consult, hydrALAZINE, acetaminophen, senna-docusate **AND** polyethylene glycol/lytes **AND** magnesium hydroxide **AND** bisacodyl, mag hydrox-al hydrox-simeth, ondansetron **OR** ondansetron, traZODone, sodium chloride, carboxymethylcellulose, methocarbamol, mirtazapine, morphine, zolpidem    Diet:  Current Diet Order   Procedures    Diet Order Diet: Regular       Medical Decision Making and Plan:   L BKA - Patient with osteomyelitis while on DMARD for RA with multiple foot wounds. Patient is s/p left BKA on 3/9/23 with Dr. Chambers. NWB LLE. Needs IPOP vs extension brace.   -PT and OT for mobility and ADLs. Per guidelines, 15 hours per week between PT, OT and/or SLP.  -Follow-up Dr. Reyes CARVAJAL - Previously on medication. Currently on hold.      Tobacco Abuse - Patient on nicotine patch. Counseled      Anemia - Check AM CBC - 9.0 on admission, will  monitor. Recheck 2/27 with Fe panel. Fe panel - low Iron. Start supplement     Hyponatremia - Check AM CMP - 132, will continue to monitor. Recheck 2/27 - 137, improved     Hypocalcemia - Check AM CMP - 8.3 on admission     Morbid obesity due to excess calories - BMI of 35.3 on admission, meets medical criteria. Dietitian to consult     Neuropathy - Patient on Lyrica TID. Continue Lyrica at current dose. Worsening night time shooting pain.  Gabapentin and Pregabalin are high risk medications in the class of MICHAEL alpha 2 receptor agonists and cause a decrease in pain, particularly neuropathic pain. Patient must be monitored as these medications have been found to be heavily affected by the kidney clearance at any dose especially in combination with opiates and/or benzodiazepines. Side effects at higher doses or with a decrease in kidney function can include encephalopathy, sedation, respiratory depression, and increased suicidality.  Will check vital signs, monitor GFR biweekly, and monitor cognitive status as well as mood. Current medication dose and plan are: Increase to 150/150/200 mg and check AM CBC/CMP/Mag.     Pain - Patient on Morphine ER, Morphine IR PRN, Tylenol, and Lyrica  -Schedule Morphine , 1130, and 1500. Continue scheduled Morphine IR and ER as well as lyrica. Will recheck Cr 2/27 - stable Cr at 0.49     Skin - Patient at risk for skin breakdown due to debility in areas including sacrum, achilles, elbows and head in addition to other sites. Nursing to assess skin daily.      GI Ppx - Patient on Prilosec for GERD prophylaxis. Patient on Senna-docusate for constipation prophylaxis.   Last BM: 03/19/23     DVT Ppx - Patient refusing AC on transfer. Patient reports he is aware of risks.  ____________________________________    T. Loi Pisano MD./PhD.  [facility] - Physical Medicine & Rehabilitation   [facility] - [facility]   ____________________________________

## 2023-03-29 NOTE — PROGRESS NOTES
Received shift report and assumed care of patient.  Patient awake, calm and stable, currently positioned in bed for comfort and safety; call light within reach. 6/10 generalized pain at this time. See mar for medication.  Will continue to monitor.

## 2023-03-29 NOTE — THERAPY
Physical Therapy   Daily Treatment     Patient Name: Richard Hubbard II  Age:  47 y.o., Sex:  male  Medical Record #: [id number]  Today's Date: 3/29/2023     Precautions  Precautions: (P) Fall Risk, Weight Bearing As Tolerated Right Lower Extremity, Non Weight Bearing Left Lower Extremity  Comments: (P) L BKA, RA - L shoulder and R wrist exacerbation. R wrist brace, L knee immobilizer donned in bed as needed, and with ambulation; R foot wound with offloading shoe OOB    Subjective    Patient in w/c in room, agreeable to PT session.      Objective       03/29/23 1301   PT Charge Group   PT Therapeutic Exercise (Units) 2   PT Total Time Spent   PT Individual Total Time Spent (Mins) 30   Precautions   Precautions Fall Risk;Weight Bearing As Tolerated Right Lower Extremity;Non Weight Bearing Left Lower Extremity   Comments L BKA, RA - L shoulder and R wrist exacerbation. R wrist brace, L knee immobilizer donned in bed as needed, and with ambulation; R foot wound with offloading shoe OOB   Transfer Functional Level of Assist   Bed, Chair, Wheelchair Transfer Standby Assist   Bed Chair Wheelchair Transfer Description Increased time;Verbal cueing;Supervision for safety;Squat pivot transfer to wheelchair   Supine Lower Body Exercise   Bridges   (2 x 10, BLE over large bolster and Green resistance band for tactile abd cue)   Sitting Lower Body Exercises   Long Arc Quad 2 sets of 15;Bilateral   Bed Mobility    Supine to Sit Supervised   Sit to Supine Supervised   Scooting Standby Assist   Interdisciplinary Plan of Care Collaboration   IDT Collaboration with  Occupational Therapist   Patient Position at End of Therapy Seated;Chair Alarm On;Tray Table within Reach;Phone within Reach;Call Light within Reach   Collaboration Comments Patient in room end of session for shower     Completed transfer training with emphasis on anterior weight shift to facilitate more clearance.     Supine and seated there ex, as above.     W/c  mobility - 140ft x1, Supervised using BUEs to propel.     Assessment    Patient tolerated treatment well. Patient demonstrating improved w/c management for transfers with one cue for seat belt and arm rest management/timing. Demonstrated improved clearance during transfers with ability to perform transfer with fewer adjustments/scoots.     Strengths: Able to follow instructions, Effective communication skills, Good insight into deficits/needs, Independent prior level of function  Barriers: Fatigue, Generalized weakness, Home accessibility, Impaired activity tolerance, Impaired balance, Pain, Pain poorly managed, Pressure ulcer    Plan       Seated dynamic balance, BLE strengthening, prone lying 20-30minutes per day, HEP for LE strengthening, bed mobility, transfers slide board vs squat pivot, standing tolerance/balance with LRAD, w/c mobility/ramp negotiation, problem solve home entry (2 steps), contracture prevention, residual limb desensitization, residual limb wrapping, Prosthetic and amputee education     Passport items to be completed:  Get in/out of bed safely, in/out of a vehicle, safely use mobility device, walk or wheel around home/community, navigate up and down stairs, show how to get up/down from the ground, ensure home is accessible, demonstrate HEP, complete caregiver training    Physical Therapy Problems (Active)       Problem: Mobility       Dates: Start: 03/22/23         Goal: STG-Within one week, patient will propel wheelchair up incline/ramp with CGA.       Dates: Start: 03/22/23               Problem: Mobility Transfers       Dates: Start: 03/22/23         Goal: STG-Within one week, patient will perform bed mobility with SBA.       Dates: Start: 03/22/23            Goal: STG-Within one week, patient will transfer bed to chair with CGA and slide board vs squat pivot.       Dates: Start: 03/22/23               Problem: PT-Long Term Goals       Dates: Start: 03/22/23         Goal: LTG-By discharge,  patient will propel wheelchair household distances with Mariano.        Dates: Start: 03/22/23            Goal: LTG-By discharge, patient will perform home exercise program independently.        Dates: Start: 03/22/23            Goal: LTG-By discharge, patient will transfer in/out of a car with supervision.        Dates: Start: 03/22/23            Goal: LTG-By discharge, patient will complete bed mobility with Mariano.        Dates: Start: 03/22/23            Goal: LTG-By discharge, patient will complete w/c to bed transfer using squat pivot vs slide board Mariano.        Dates: Start: 03/22/23

## 2023-03-29 NOTE — PROGRESS NOTES
"  Physical Medicine & Rehabilitation Progress Note    Encounter Date: 3/29/2023    Chief Complaint: Decreased mobility, leg pain    Interval Events (Subjective):  Patient sitting up in room. He reports he is doing well. He reports his pain medications are working where they are. He has a skin rash, will start hydrocortisone cream and monitor.     _____________________________________  Interdisciplinary Team Conference   Most recent IDT on 3/23/2023    Discharge Date/Disposition:  4/11/23  _____________________________________      Objective:  VITAL SIGNS: /77   Pulse 65   Temp 36.3 °C (97.4 °F) (Oral)   Resp 18   Ht 1.8 m (5' 10.87\")   Wt 95.8 kg (211 lb 3.2 oz)   SpO2 98%   BMI 29.57 kg/m²   Gen: NAD  Psych: Mood and affect appropriate  CV: RRR, 0 edema  Resp: CTAB, no upper airway sounds  Abd: NTND  Neuro: AOx4, following commands, able to extend left hip anti gravity briefly\  Unchanged from 3/28/23    Laboratory Values:  Recent Results (from the past 72 hour(s))   RETICULOCYTES COUNT    Collection Time: 03/27/23  5:14 AM   Result Value Ref Range    Reticulocyte Count 2.4 (H) 0.8 - 2.1 %    Retic, Absolute 0.08 (H) 0.04 - 0.06 M/uL    Imm. Reticulocyte Fraction 16.4 9.3 - 17.4 %    Retic Hgb Equivalent 30.5 29.0 - 35.0 pg/cell   IRON/TOTAL IRON BIND    Collection Time: 03/27/23  5:14 AM   Result Value Ref Range    Iron 36 (L) 50 - 180 ug/dL    Total Iron Binding 163 (L) 250 - 450 ug/dL    Unsat Iron Binding 127 110 - 370 ug/dL    % Saturation 22 15 - 55 %   Comp Metabolic Panel    Collection Time: 03/27/23  5:14 AM   Result Value Ref Range    Sodium 137 135 - 145 mmol/L    Potassium 4.1 3.6 - 5.5 mmol/L    Chloride 104 96 - 112 mmol/L    Co2 27 20 - 33 mmol/L    Anion Gap 6.0 (L) 7.0 - 16.0    Glucose 78 65 - 99 mg/dL    Bun 9 8 - 22 mg/dL    Creatinine 0.49 (L) 0.50 - 1.40 mg/dL    Calcium 8.5 8.5 - 10.5 mg/dL    AST(SGOT) 17 12 - 45 U/L    ALT(SGPT) 11 2 - 50 U/L    Alkaline Phosphatase 314 (H) 30 " - 99 U/L    Total Bilirubin 0.3 0.1 - 1.5 mg/dL    Albumin 2.5 (L) 3.2 - 4.9 g/dL    Total Protein 7.4 6.0 - 8.2 g/dL    Globulin 4.9 (H) 1.9 - 3.5 g/dL    A-G Ratio 0.5 g/dL   ESTIMATED GFR    Collection Time: 03/27/23  5:14 AM   Result Value Ref Range    GFR (CKD-EPI) 128 >60 mL/min/1.73 m 2   CORRECTED CALCIUM    Collection Time: 03/27/23  5:14 AM   Result Value Ref Range    Correct Calcium 9.7 8.5 - 10.5 mg/dL   CBC WITH DIFFERENTIAL    Collection Time: 03/29/23  5:29 AM   Result Value Ref Range    WBC 4.8 4.8 - 10.8 K/uL    RBC 3.46 (L) 4.70 - 6.10 M/uL    Hemoglobin 9.8 (L) 14.0 - 18.0 g/dL    Hematocrit 31.8 (L) 42.0 - 52.0 %    MCV 91.9 81.4 - 97.8 fL    MCH 28.3 27.0 - 33.0 pg    MCHC 30.8 (L) 33.7 - 35.3 g/dL    RDW 50.0 35.9 - 50.0 fL    Platelet Count 301 164 - 446 K/uL    MPV 9.0 9.0 - 12.9 fL    Neutrophils-Polys 37.10 (L) 44.00 - 72.00 %    Lymphocytes 49.30 (H) 22.00 - 41.00 %    Monocytes 8.60 0.00 - 13.40 %    Eosinophils 3.30 0.00 - 6.90 %    Basophils 1.30 0.00 - 1.80 %    Immature Granulocytes 0.40 0.00 - 0.90 %    Nucleated RBC 0.00 /100 WBC    Neutrophils (Absolute) 1.78 (L) 1.82 - 7.42 K/uL    Lymphs (Absolute) 2.36 1.00 - 4.80 K/uL    Monos (Absolute) 0.41 0.00 - 0.85 K/uL    Eos (Absolute) 0.16 0.00 - 0.51 K/uL    Baso (Absolute) 0.06 0.00 - 0.12 K/uL    Immature Granulocytes (abs) 0.02 0.00 - 0.11 K/uL    NRBC (Absolute) 0.00 K/uL   Basic Metabolic Panel    Collection Time: 03/29/23  5:29 AM   Result Value Ref Range    Sodium 138 135 - 145 mmol/L    Potassium 4.4 3.6 - 5.5 mmol/L    Chloride 103 96 - 112 mmol/L    Co2 27 20 - 33 mmol/L    Glucose 76 65 - 99 mg/dL    Bun 9 8 - 22 mg/dL    Creatinine 0.44 (L) 0.50 - 1.40 mg/dL    Calcium 8.4 (L) 8.5 - 10.5 mg/dL    Anion Gap 8.0 7.0 - 16.0   MAGNESIUM    Collection Time: 03/29/23  5:29 AM   Result Value Ref Range    Magnesium 1.9 1.5 - 2.5 mg/dL   ESTIMATED GFR    Collection Time: 03/29/23  5:29 AM   Result Value Ref Range    GFR (CKD-EPI)  132 >60 mL/min/1.73 m 2       Medications:  Scheduled Medications   Medication Dose Frequency    pregabalin  150 mg BID    pregabalin  200 mg QHS    hydrocortisone   TID    nicotine  7 mg Daily-0600    multivitamin  1 Tablet DAILY    morphine  30 mg TID    senna-docusate  2 Tablet BID    acetaminophen  650 mg Q6HRS    calcium citrate  950 mg DAILY    DULoxetine  60 mg DAILY    morphine ER  30 mg Q12HRS    omeprazole  40 mg DAILY    thiamine  100 mg DAILY    vitamin D2 (Ergocalciferol)  50,000 Units Q7 DAYS     PRN medications: sodium chloride, capsaicin, Respiratory Therapy Consult, hydrALAZINE, acetaminophen, senna-docusate **AND** polyethylene glycol/lytes **AND** magnesium hydroxide **AND** bisacodyl, mag hydrox-al hydrox-simeth, ondansetron **OR** ondansetron, traZODone, sodium chloride, carboxymethylcellulose, methocarbamol, mirtazapine, morphine, zolpidem    Diet:  Current Diet Order   Procedures    Diet Order Diet: Regular       Medical Decision Making and Plan:   L BKA - Patient with osteomyelitis while on DMARD for RA with multiple foot wounds. Patient is s/p left BKA on 3/9/23 with Dr. Chambers. NWB LLE. Needs IPOP vs extension brace.   -PT and OT for mobility and ADLs. Per guidelines, 15 hours per week between PT, OT and/or SLP.  -Follow-up Dr. Reyes CARVAJAL - Previously on medication. Currently on hold.      Tobacco Abuse - Patient on nicotine patch. Counseled      Anemia - Check AM CBC - 9.0 on admission, will monitor. Recheck 2/27 with Fe panel. Fe panel - low Iron. Start supplement. Repeat 9.8, will monitor     Hyponatremia - Check AM CMP - 132, will continue to monitor. Recheck 2/27 - 137, improved     Hypocalcemia - Check AM CMP - 8.3 on admission     Morbid obesity due to excess calories - BMI of 35.3 on admission, meets medical criteria. Dietitian to consult     Neuropathy - Patient on Lyrica TID. Continue Lyrica at current dose. Worsening night time shooting pain. Increase to 150/150/200 mg  and check AM CBC/CMP/Mag. Labs stable, improved night time pain control.      Pain - Patient on Morphine ER, Morphine IR PRN, Tylenol, and Lyrica  -Schedule Morphine , 1130, and 1500. Continue scheduled Morphine IR and ER as well as lyrica. Will recheck Cr 2/27 - stable Cr at 0.49     Skin - Patient at risk for skin breakdown due to debility in areas including sacrum, achilles, elbows and head in addition to other sites. Nursing to assess skin daily.   -Rash on 3/28, start hydrocortisone.      GI Ppx - Patient on Prilosec for GERD prophylaxis. Patient on Senna-docusate for constipation prophylaxis.   Last BM: 03/19/23     DVT Ppx - Patient refusing AC on transfer. Patient reports he is aware of risks.  ____________________________________    T. Loi Pisano MD./PhD.  [facility] - Physical Medicine & Rehabilitation   [facility] - Brain Injury Medicine   ____________________________________

## 2023-03-29 NOTE — CARE PLAN
The patient is Stable - Low risk of patient condition declining or worsening    Shift Goals  Clinical Goals: safety, respiration monitoring  Patient Goals: pain control, rest,    Progress made toward(s) clinical / shift goals:    Problem: Pain - Standard  Goal: Alleviation of pain or a reduction in pain to the patient’s comfort goal  Outcome: Progressing. Patient has generalized achy pain to LLE and elbow, knee and shoulder joints constantly rated at 7-8/10 for pain. Patient on scheduled Morphine ER 30 mg BID as well as scheduled tylenol 650 mg TID, as well as Morphine IR 30 mg for breakthrough pain in between. Patient also does well with heat packs to back of shoulders as needed.      Problem: Bladder / Voiding  Goal: Patient will establish and maintain regular urinary output  Outcome: Progressing, Patient voiding well over shift. Uses urinal in bed. Clear yellow urine, continent of bladder, good output.

## 2023-03-29 NOTE — THERAPY
Occupational Therapy  Daily Treatment     Patient Name: Richard Hubbard II  Age:  47 y.o., Sex:  male  Medical Record #: [id number]  Today's Date: 3/29/2023     Precautions  Precautions: Fall Risk, Non Weight Bearing Left Lower Extremity, Weight Bearing As Tolerated Right Lower Extremity  Comments: L BKA, RA - L shoulder and R wrist exacerbation. R wrist brace, L knee immobilizer donned in bed as needed, and with ambulation; R foot wound with offloading shoe OOB         Subjective    Pt seated in w/c upon arrival, pleasant and cooperative, agreeable to therapy and shower       Objective       03/29/23 1031   OT Charge Group   OT Therapy Activity (Units) 2   OT Total Time Spent   OT Individual Total Time Spent (Mins) 30   IADL Treatments   Home Management Pt completed laundry task at w/c level.  Pt loaded washer and manage washer dials with reacher at SBA   Interdisciplinary Plan of Care Collaboration   Patient Position at End of Therapy Seated;Call Light within Reach;Tray Table within Reach     Functional mobility room<>laundry room SBA at w/c level     03/29/23 1331   OT Charge Group   OT Self Care / ADL (Units) 4   OT Total Time Spent   OT Individual Total Time Spent (Mins) 60   Functional Level of Assist   Grooming Modified Independent;Seated   Bathing Minimal Assist  (assist with washing buttocks)   Upper Body Dressing Independent   Lower Body Dressing Standby Assist  (supine at bed)   Tub / Shower Transfers Minimal Assist  (W/c to fold down bench lateral scoot SBA, bench to w/c uphill required slideboard with Min A)   Interdisciplinary Plan of Care Collaboration   Patient Position at End of Therapy In Bed;Call Light within Reach;Tray Table within Reach     Functional mobility bedroom<>bathroom SBA at w/c level    Edu on slideboard technique: scoot vs lift, dry vs wet transfer modifications. Pt receptive to edu and verbalized good understanding.    Pt prepped, gathered supplies prior to shower and put away  supplies post shower at w/c level SBA    Assessment    Pt completed shower routine at Min A - SBA overall using w/c, shower bench, GB's, reacher AE. Pt's functional performance was impacted by generalized weakness, pain, and impaired balance. Pt will continue to benefit from ongoing slideboard training and generalized strengthening for improve transfers and bathroom safety.      Strengths: Able to follow instructions, Alert and oriented, Effective communication skills, Good insight into deficits/needs, Independent prior level of function, Motivated for self care and independence, Pleasant and cooperative, Supportive family, Willingly participates in therapeutic activities  Barriers: Decreased endurance, Fatigue, Generalized weakness, Home accessibility, Impaired activity tolerance, Impaired balance, Limited mobility, Pain, Pain poorly managed    Plan    Refer to Primary OT POC/goals     Occupational Therapy Goals (Active)       Problem: Bathing       Dates: Start: 03/22/23         Goal: STG-Within one week, patient will bathe at CGA level using DME/AE PRN.       Dates: Start: 03/22/23               Problem: Dressing       Dates: Start: 03/22/23         Goal: STG-Within one week, patient will dress LB at Mod A level using DME/AE PRN.       Dates: Start: 03/22/23               Problem: Functional Transfers       Dates: Start: 03/22/23         Goal: STG-Within one week, patient will transfer to toilet at Min A level using DME/AE PRN.       Dates: Start: 03/22/23               Problem: OT Long Term Goals       Dates: Start: 03/22/23         Goal: LTG-By discharge, patient will complete basic self care tasks at Min A-Mod I level using DME/AE PRN.       Dates: Start: 03/22/23            Goal: LTG-By discharge, patient will perform bathroom transfers at SBA-Mod I level using DME/AE PRN.       Dates: Start: 03/22/23            Goal: LTG-By discharge, patient will complete basic home management at Min A-Mod I level using  DME/AE PRN.       Dates: Start: 03/22/23               Problem: Toileting       Dates: Start: 03/22/23         Goal: STG-Within one week, patient will complete toileting tasks at Max A level using DME/AE PRN.       Dates: Start: 03/22/23

## 2023-03-29 NOTE — THERAPY
Physical Therapy   Daily Treatment     Patient Name: Richard Hubbard II  Age:  47 y.o., Sex:  male  Medical Record #: [id number]  Today's Date: 3/29/2023     Precautions  Precautions: (P) Fall Risk, Non Weight Bearing Left Lower Extremity, Weight Bearing As Tolerated Right Lower Extremity  Comments: (P) L BKA, R foot wound, RA - L shoulder and R wrist exacerbation. R wrist brace, L knee emobilizer donned in bed as needed, and with ambulation    Subjective    Patient in w/c, agreeable to therapy.      Objective       03/29/23 0831   Precautions   Precautions Fall Risk;Non Weight Bearing Left Lower Extremity;Weight Bearing As Tolerated Right Lower Extremity   Comments L BKA, RA - L shoulder and R wrist exacerbation. R wrist brace, L knee immobilizer donned in bed as needed, and with ambulation; R foot wound with offloading shoe OOB   Wheelchair Functional Level of Assist   Wheelchair Assist Stand by Assist   Distance Wheelchair (Feet or Distance) 140   Wheelchair Description Extra time;Verbal cueing;Limited by fatigue  (BUE propulsion)   Transfer Functional Level of Assist   Bed, Chair, Wheelchair Transfer Standby Assist   Bed Chair Wheelchair Transfer Description Increased time;Squat pivot transfer to wheelchair;Supervision for safety  (EOB <> w/c <> therapy mat)   Bed Mobility    Supine to Sit Supervised   Sit to Supine Supervised   Sit to Stand Contact Guard Assist; from elevated surface    Scooting Standby Assist   Rolling Standby Assist   Interdisciplinary Plan of Care Collaboration   IDT Collaboration with  Nursing   Patient Position at End of Therapy Seated;Chair Alarm On;Self Releasing Lap Belt Applied;Call Light within Reach;Tray Table within Reach;Phone within Reach   Collaboration Comments Medication     Reviewed prone lying daily practice for contracture prevention 20-30 minutes per day. Performed bed mobility to attain prone position in hospital bed. Provided education on positioning for effective  rolling in narrow space, rail requirements, and HEP to perform in prone position. Patient verbalizing and demonstrating understanding of recommendations. Patient able to safely complete task in hospital task with good safety.     Sit to stand practice from raised therapy table, 2 x 5 with decreased table height for second set, CGA with FWW. Verbal cues for  upright posture, hand placement for ascent and descent, and eccentric lowering.     Seated dynamic balance:    - Reaching for cone outside ABHISHEK anterolaterally, re stack on contralateral side, CGA; verbal cues for upright posture.     Core strengthening:    - Seated crunch BUE holding 1# ball > B shoulder flexion - 2 x 15    - Seated Austrian twist with BUE holding 1# ball - 1 minute     Continued practice with w/c set up and leg rest negotiation and increasing anterior trunk lean while using head-hips relationship to avoid shearing on surfaces during transfers.     Assessment    Patient tolerated treatment well. Verbalized and demonstrated understanding of independently performing prone positioning and HEP prone exercises in hospital bed. Progressed sit to stands from lower surface with emphasis on increased speed/power with ascent, and eccentric control with lowering.     Strengths: Able to follow instructions, Effective communication skills, Good insight into deficits/needs, Independent prior level of function  Barriers: Fatigue, Generalized weakness, Home accessibility, Impaired activity tolerance, Impaired balance, Pain, Pain poorly managed, Pressure ulcer    Plan    Seated dynamic balance, BLE strengthening, prone lying 20-30minutes per day, HEP for LE strengthening, bed mobility, transfers slide board vs squat pivot, standing tolerance/balance with LRAD, w/c mobility/ramp negotiation, problem solve home entry (2 steps), contracture prevention, residual limb desensitization, residual limb wrapping, Prosthetic and amputee education     Passport items to be  completed:  Get in/out of bed safely, in/out of a vehicle, safely use mobility device, walk or wheel around home/community, navigate up and down stairs, show how to get up/down from the ground, ensure home is accessible, demonstrate HEP, complete caregiver training    Physical Therapy Problems (Active)       Problem: Mobility       Dates: Start: 03/22/23         Goal: STG-Within one week, patient will propel wheelchair up incline/ramp with CGA.       Dates: Start: 03/22/23               Problem: Mobility Transfers       Dates: Start: 03/22/23         Goal: STG-Within one week, patient will perform bed mobility with SBA.       Dates: Start: 03/22/23            Goal: STG-Within one week, patient will transfer bed to chair with CGA and slide board vs squat pivot.       Dates: Start: 03/22/23               Problem: PT-Long Term Goals       Dates: Start: 03/22/23         Goal: LTG-By discharge, patient will propel wheelchair household distances with Mariano.        Dates: Start: 03/22/23            Goal: LTG-By discharge, patient will perform home exercise program independently.        Dates: Start: 03/22/23            Goal: LTG-By discharge, patient will transfer in/out of a car with supervision.        Dates: Start: 03/22/23            Goal: LTG-By discharge, patient will complete bed mobility with Mariano.        Dates: Start: 03/22/23            Goal: LTG-By discharge, patient will complete w/c to bed transfer using squat pivot vs slide board Mariano.        Dates: Start: 03/22/23

## 2023-03-30 NOTE — CARE PLAN
Problem: Knowledge Deficit - Standard  Goal: Patient and family/care givers will demonstrate understanding of plan of care, disease process/condition, diagnostic tests and medications  Outcome: Progressing     Problem: Skin Integrity  Goal: Skin integrity is maintained or improved  Note: Patient's skin is maintained and free from new or accidental injury this shift.  Will continue to monitor.    The patient is Stable - Low risk of patient condition declining or worsening    Shift Goals  Clinical Goals: Safety  Patient Goals: pain control, rest

## 2023-03-30 NOTE — CARE PLAN
Problem: Bathing  Goal: STG-Within one week, patient will bathe at CGA level using DME/AE PRN.  Outcome: Progressing     Problem: Dressing  Goal: STG-Within one week, patient will dress LB at Mod A level using DME/AE PRN.  Outcome: Met     Problem: Toileting  Goal: STG-Within one week, patient will complete toileting tasks at Max A level using DME/AE PRN.  Outcome: Met     Problem: Functional Transfers  Goal: STG-Within one week, patient will transfer to toilet at Min A level using DME/AE PRN.  Outcome: Met

## 2023-03-30 NOTE — THERAPY
"Occupational Therapy  Daily Treatment     Patient Name: Richard Hubbard II  Age:  47 y.o., Sex:  male  Medical Record #: [id number]  Today's Date: 3/30/2023     Precautions  Precautions: Fall Risk, Non Weight Bearing Left Lower Extremity, Weight Bearing As Tolerated Right Lower Extremity  Comments: L BKA, RA - L shoulder and R wrist exacerbation. R wrist brace, L knee immobilizer donned in bed as needed, and with ambulation; R foot wound with offloading shoe OOB         Subjective    \"My wife will be getting those measurements soon.\" Referring to home setup for simulation.     Objective       03/30/23 0931   OT Charge Group   OT Therapy Activity (Units) 1   OT Therapeutic Exercise (Units) 3   OT Total Time Spent   OT Individual Total Time Spent (Mins) 60   Functional Level of Assist   Bed, Chair, Wheelchair Transfer Standby Assist   Bed Chair Wheelchair Transfer Description Increased time;Supervision for safety;Squat pivot transfer to wheelchair   Sitting Upper Body Exercises   Chest Press 3 sets of 10;Bilateral;Weight (See Comments for lbs)  (15lbs Equilizer)   Tricep Press Bilateral;Weight (See Comments for lbs)  (Rickshaw 5 sets 10 reps @ 35#, 3 sets of 10 reps back @ 25#)   Interdisciplinary Plan of Care Collaboration   Patient Position at End of Therapy In Bed;Call Light within Reach     Long detailed discussion for DC planning and home setup, as pt reports that a w/c may not fit through doorways at home. Listed possible configurations with bedside commode, use of master bedroom, and use of his childrens BR for toileting.     Pt completed w/c propel from room to west gym then intermittently between machines as limited by fatigue and shoulder pain.     Pt engaged in static standing in //bars with focus on single arm support with 1 set of 5 taps to opposite hip in preparation for toileting skills. Pt utilized w/c arms for sit>standing and //bars for decent. Min A for sit<>standing.    Assessment    Pt " continues to make good progress with overall UB strength and endurance, despite high pain levels. Anticipate measurements and pictures by tomorrow to begin simulating home setup for functional BR transfers.  Strengths: Able to follow instructions, Alert and oriented, Effective communication skills, Good insight into deficits/needs, Independent prior level of function, Motivated for self care and independence, Pleasant and cooperative, Supportive family, Willingly participates in therapeutic activities  Barriers: Decreased endurance, Fatigue, Generalized weakness, Home accessibility, Impaired activity tolerance, Impaired balance, Limited mobility, Pain, Pain poorly managed    Plan    Simulate toilet setup on L-side for home, BUE strengthening within reduced pain ranges, sitting balance     Passport items to be completed:  Perform bathroom transfers, complete dressing, complete feeding, get ready for the day, prepare a simple meal, participate in household tasks, adapt home for safety needs, demonstrate home exercise program, complete caregiver training     Occupational Therapy Goals (Active)       Problem: Bathing       Dates: Start: 03/22/23         Goal: STG-Within one week, patient will bathe at CGA level using DME/AE PRN.       Dates: Start: 03/22/23               Problem: Dressing       Dates: Start: 03/22/23         Goal: STG-Within one week, patient will dress LB at Mod A level using DME/AE PRN.       Dates: Start: 03/22/23               Problem: Functional Transfers       Dates: Start: 03/22/23         Goal: STG-Within one week, patient will transfer to toilet at Min A level using DME/AE PRN.       Dates: Start: 03/22/23               Problem: OT Long Term Goals       Dates: Start: 03/22/23         Goal: LTG-By discharge, patient will complete basic self care tasks at Min A-Mod I level using DME/AE PRN.       Dates: Start: 03/22/23            Goal: LTG-By discharge, patient will perform bathroom transfers at  SBA-Mod I level using DME/AE PRN.       Dates: Start: 03/22/23            Goal: LTG-By discharge, patient will complete basic home management at Min A-Mod I level using DME/AE PRN.       Dates: Start: 03/22/23               Problem: Toileting       Dates: Start: 03/22/23         Goal: STG-Within one week, patient will complete toileting tasks at Max A level using DME/AE PRN.       Dates: Start: 03/22/23

## 2023-03-30 NOTE — CARE PLAN
The patient is Stable - Low risk of patient condition declining or worsening    Shift Goals  Clinical Goals: safety, respiration monitoring  Patient Goals: pain control, rest,    Progress made toward(s) clinical / shift goals:    Problem: Pain - Standard  Goal: Alleviation of pain or a reduction in pain to the patient’s comfort goal  Outcome: Progressing. Patient requested no morphine IR over shift, states pain has been tolerable. Has scheduled tylenol q6 hours as well as scheduled morphine ER 30 mg that he takes regularly as ordered.      Problem: Bladder / Voiding  Goal: Patient will establish and maintain regular urinary output  Outcome: Progressing. Patient continent of bladder. Uses urinal in bed. Clear yellow urine with good output over shift, calls appropriately with call light for staff to help empty urinals overnight.

## 2023-03-30 NOTE — DIETARY
Nutrition Services Brief Update:    Day 9 of admit.  Richard Hubbard II is a 47 y.o. male with admitting DX of Sepsis (HCC) [A41.9]  S/P BKA (below knee amputation) unilateral, left (HCC) [Z89.512]    Current Diet: regular    PO intake is % of most meals w/ avg of 68%.  PO intake is most likely adequate.     Problem: Nutritional:  Goal: Achieve adequate nutritional intake  Description: Patient will consume >50% of meals  Outcome: met    RD will follow weekly or PRN.

## 2023-03-30 NOTE — DISCHARGE PLANNING
Case Management/IDT follow up.   IDT continues to recommend IRF level of care as patient continue to make progress with all therapies.   DC date set for 4/11      DC needs:  Recommendations made for home health for PT/OT/RN; wheelchair 20 x 16 x 20 full back w/ standard cushion; flip back arm rests; bilateral elevating legs rests; seat belt; pt has a tub transfer bench @ home.  Choice forms completed.     Follow up with: pcp/ ortho/ dr juarez out pt pmr    Met with pt / family providing update from IDT and discussed plan of care.    Plan:  Continue to follow

## 2023-03-30 NOTE — CARE PLAN
Problem: Mobility  Goal: STG-Within one week, patient will propel wheelchair up incline/ramp with CGA.  3/30/2023 1337 by Onelia Og, Student  Outcome: Met  3/30/2023 1206 by Onelia Og, Student  Outcome: Progressing     Problem: Mobility Transfers  Goal: STG-Within one week, patient will perform bed mobility with SBA.  Outcome: Met  Goal: STG-Within one week, patient will transfer bed to chair with CGA and slide board vs squat pivot.  Outcome: Met

## 2023-03-30 NOTE — THERAPY
Physical Therapy   Daily Treatment     Patient Name: Richard Hubbard II  Age:  47 y.o., Sex:  male  Medical Record #: [id number]  Today's Date: 3/30/2023     Precautions  Precautions: (P) Fall Risk, Non Weight Bearing Left Lower Extremity, Weight Bearing As Tolerated Right Lower Extremity  Comments: (P) L BKA, RA - L shoulder and R wrist exacerbation. R wrist brace, L knee immobilizer donned in bed as needed, and with ambulation; R foot wound with offloading shoe OOB    Subjective    Patient seated in w/c, agreeable to therapy.      Objective       03/30/23 1301   Precautions   Precautions Fall Risk;Non Weight Bearing Left Lower Extremity;Weight Bearing As Tolerated Right Lower Extremity   Comments L BKA, RA - L shoulder and R wrist exacerbation. R wrist brace, L knee immobilizer donned in bed as needed, and with ambulation; R foot wound with offloading shoe OOB   Gait Functional Level of Assist    Gait Level Of Assist Contact Guard Assist   Assistive Device Parallel Bars   Distance (Feet)   (2 x 2 hops forward and backwards, approximately 2ft)   Deviation Antalgic  (Heavy BUE support on parallel bars)   Wheelchair Functional Level of Assist   Wheelchair Assist Supervised   Distance Wheelchair (Feet or Distance)   (300ft x1, 150ft x1; 25ft x3 outside(ramp))   Wheelchair Description Extra time;Verbal cueing  (BUE propulsion, over level ground and uneven ground outside)   Bed Mobility    Sit to Stand Standby Assist  (Ascent from w/c arms inside parallel bars, descent using parallel bars to lower)   Interdisciplinary Plan of Care Collaboration   Patient Position at End of Therapy Seated;Self Releasing Lap Belt Applied;Tray Table within Reach;Phone within Reach;Call Light within Reach     Performed ramp negotiation outdoors 3x; SBA for ascent and descent, BUE propulsion.     Sit <> stand 5x in parallel bars with SBA; performing with BUE support on w/c for ascent, and BUE support on parallel bars for descent. Verbal  and tactile cues for anterior weight shift and mechanics.     Gait training in parallel bars with BUE support on bars with CGA; patient able to hop 2x 2 hops forward and backwards; verbal cues for increased foot clearance to avoid shoe catching on floor. Demonstrates heavy support on bars.     Assessment    Patient tolerated treatment well. Performed ramp negotiation with good technique and safety. Patient able to participate in hopping in parallel bars, demonstrating improved standing tolerance. Patient continues to report R knee pain limiting mechanics of sit to stand and heavily relying on BUE for initiation of task.     Strengths: Able to follow instructions, Effective communication skills, Good insight into deficits/needs, Independent prior level of function  Barriers: Fatigue, Generalized weakness, Home accessibility, Impaired activity tolerance, Impaired balance, Pain, Pain poorly managed, Pressure ulcer    Plan    Seated dynamic balance, BLE strengthening, prone lying 20-30minutes per day (patient able to perform independently), HEP for LE strengthening, bed mobility, transfers squat pivot, standing tolerance/balance with LRAD, w/c mobility/ramp negotiation, contracture prevention, residual limb desensitization, Independent residual limb wrapping, Prosthetic and amputee education     Passport items to be completed:  Get in/out of bed safely, in/out of a vehicle, safely use mobility device, walk or wheel around home/community, navigate up and down stairs, show how to get up/down from the ground, ensure home is accessible, demonstrate HEP, complete caregiver training    Physical Therapy Problems (Active)       Problem: PT-Long Term Goals       Dates: Start: 03/22/23         Goal: LTG-By discharge, patient will propel wheelchair household distances with Mariano.        Dates: Start: 03/22/23            Goal: LTG-By discharge, patient will perform home exercise program independently.        Dates: Start: 03/22/23             Goal: LTG-By discharge, patient will transfer in/out of a car with supervision.        Dates: Start: 03/22/23            Goal: LTG-By discharge, patient will complete bed mobility with Mariano.        Dates: Start: 03/22/23            Goal: LTG-By discharge, patient will complete w/c to bed transfer using squat pivot vs slide board Mariano.        Dates: Start: 03/22/23

## 2023-03-30 NOTE — THERAPY
Physical Therapy   Daily Treatment     Patient Name: Richard Hubbard II  Age:  47 y.o., Sex:  male  Medical Record #: [id number]  Today's Date: 3/30/2023     Precautions  Precautions: (P) Fall Risk, Non Weight Bearing Left Lower Extremity, Weight Bearing As Tolerated Right Lower Extremity  Comments: (P) L BKA, RA - L shoulder and R wrist exacerbation. R wrist brace, L knee immobilizer donned in bed as needed, and with ambulation; R foot wound with offloading shoe OOB    Subjective    Patient in w/c, agreeable to PT session.      Objective       03/30/23 0831   Precautions   Precautions Fall Risk;Non Weight Bearing Left Lower Extremity;Weight Bearing As Tolerated Right Lower Extremity   Comments L BKA, RA - L shoulder and R wrist exacerbation. R wrist brace, L knee immobilizer donned in bed as needed, and with ambulation; R foot wound with offloading shoe OOB   Wheelchair Functional Level of Assist   Wheelchair Assist Stand by Assist   Distance Wheelchair (Feet or Distance) 300   Wheelchair Description Verbal cueing;Extra time  (BUE propulsion)   Transfer Functional Level of Assist   Bed, Chair, Wheelchair Transfer Standby Assist   Bed Chair Wheelchair Transfer Description Increased time;Verbal cueing;Supervision for safety;Squat pivot transfer to wheelchair   Supine Lower Body Exercise   Bridges 2 sets of 10  (LLE over large bolster)   Standing Lower Body Exercises   Hamstring Curl 2 sets of 10  (In parallel bars, LLE with pink resistance band, BUE support on parallel bars)   Heel Rise 2 sets of 10  (Parallel bars, BUE support on parallel bars, verbal cues to maintain R knee and hip extension)   Mini Squat 2 sets of 10  (Parallel bars, BUE support on bars, verbal and tactile cues for technique)   Bed Mobility    Sit to Stand Contact Guard Assist  (from W/C, BUE support on parallel bars)   Scooting Standby Assist   Interdisciplinary Plan of Care Collaboration   IDT Collaboration with  Nursing   Patient Position at  End of Therapy Seated;Call Light within Reach;Tray Table within Reach;Phone within Reach   Collaboration Comments Medication     Patient wheeled to and from therapy, 300ft 2x, with independent propulsion into parallel bars.     Performed standing therex as above in parallel bars, able to stand with BUE support and SBA for 17 minutes prior to requiring seated rest.     Residual limb wrapping with patient observing technique in preparation for performing on self.    Assessment    Patient tolerated treatment well, focus of session on standing tolerance and strengthening exercises. Continues to require verbal cues for transfer mechanics, improving ability to anterior weight shift.     Strengths: Able to follow instructions, Effective communication skills, Good insight into deficits/needs, Independent prior level of function  Barriers: Fatigue, Generalized weakness, Home accessibility, Impaired activity tolerance, Impaired balance, Pain, Pain poorly managed, Pressure ulcer    Plan    Seated dynamic balance, BLE strengthening, prone lying 20-30minutes per day, HEP for LE strengthening, bed mobility, transfers slide board vs squat pivot, standing tolerance/balance with LRAD, w/c mobility/ramp negotiation, problem solve home entry (2 steps), contracture prevention, residual limb desensitization, Independent residual limb wrapping, Prosthetic and amputee education     Passport items to be completed:  Get in/out of bed safely, in/out of a vehicle, safely use mobility device, walk or wheel around home/community, navigate up and down stairs, show how to get up/down from the ground, ensure home is accessible, demonstrate HEP, complete caregiver training    Physical Therapy Problems (Active)       Problem: Mobility       Dates: Start: 03/22/23         Goal: STG-Within one week, patient will propel wheelchair up incline/ramp with CGA.       Dates: Start: 03/22/23               Problem: Mobility Transfers       Dates: Start:  03/22/23         Goal: STG-Within one week, patient will perform bed mobility with SBA.       Dates: Start: 03/22/23            Goal: STG-Within one week, patient will transfer bed to chair with CGA and slide board vs squat pivot.       Dates: Start: 03/22/23               Problem: PT-Long Term Goals       Dates: Start: 03/22/23         Goal: LTG-By discharge, patient will propel wheelchair household distances with Mariano.        Dates: Start: 03/22/23            Goal: LTG-By discharge, patient will perform home exercise program independently.        Dates: Start: 03/22/23            Goal: LTG-By discharge, patient will transfer in/out of a car with supervision.        Dates: Start: 03/22/23            Goal: LTG-By discharge, patient will complete bed mobility with Mariano.        Dates: Start: 03/22/23            Goal: LTG-By discharge, patient will complete w/c to bed transfer using squat pivot vs slide board Mariano.        Dates: Start: 03/22/23

## 2023-03-30 NOTE — CARE PLAN
Problem: Mobility  Goal: STG-Within one week, patient will propel wheelchair up incline/ramp with CGA.  Outcome: Progressing     Problem: Mobility Transfers  Goal: STG-Within one week, patient will perform bed mobility with SBA.  Outcome: Met  Goal: STG-Within one week, patient will transfer bed to chair with CGA and slide board vs squat pivot.  Outcome: Met

## 2023-03-30 NOTE — THERAPY
Occupational Therapy  Daily Treatment     Patient Name: Richard Hubbard II  Age:  47 y.o., Sex:  male  Medical Record #: [id number]  Today's Date: 3/30/2023     Precautions  Precautions: (P) Fall Risk, Non Weight Bearing Left Lower Extremity, Weight Bearing As Tolerated Right Lower Extremity  Comments: (P) L BKA, RA - L shoulder and R wrist exacerbation. R wrist brace, L knee immobilizer donned in bed as needed, and with ambulation; R foot wound with offloading shoe OOB    Subjective    Patient seated in w/c upon arrival, agreeable to participate in OT.     Stated his spouse will take bed measurements when able.      Objective     03/30/23 1331   OT Charge Group   OT Therapeutic Exercise (Units) 2   OT Total Time Spent   OT Individual Total Time Spent (Mins) 30   Precautions   Precautions Fall Risk;Non Weight Bearing Left Lower Extremity;Weight Bearing As Tolerated Right Lower Extremity   Comments L BKA, RA - L shoulder and R wrist exacerbation. R wrist brace, L knee immobilizer donned in bed as needed, and with ambulation; R foot wound with offloading shoe OOB   Sitting Upper Body Exercises   Shoulder Press 1 set of 10;Right ;Left;Weight (See Comments for lbs)  (w/ 3# dumbbell)   Bilateral Row 1 set of 10;Right ;Left;Weight (See Comments for lbs)  (w/ 3# dumbbell)   Bicep Curls 1 set of 10;Right ;Left;Weight (See Comments for lbs)  (w/ 3# dumbbell)   Upper Extremity Bike Level 2 Resistance  (fluidobike x 8 mins, no RBs taken)   Interdisciplinary Plan of Care Collaboration   Patient Position at End of Therapy Seated       Assessment    Patient completed UE therex to the best of his abilities w/ some some discomfort reported in L shoulder (due to RA per patient). Discussed using lighter weights to prevent over compensating with proximal muscles and mirror for visual feedback.   Strengths: Able to follow instructions, Alert and oriented, Effective communication skills, Good insight into deficits/needs, Independent  prior level of function, Motivated for self care and independence, Pleasant and cooperative, Supportive family, Willingly participates in therapeutic activities  Barriers: Decreased endurance, Fatigue, Generalized weakness, Home accessibility, Impaired activity tolerance, Impaired balance, Limited mobility, Pain, Pain poorly managed    Plan    Simulate toilet setup on L-side for home, BUE strengthening within reduced pain ranges, sitting balance     Passport items to be completed:  Perform bathroom transfers, complete dressing, complete feeding, get ready for the day, prepare a simple meal, participate in household tasks, adapt home for safety needs, demonstrate home exercise program, complete caregiver training        Occupational Therapy Goals (Active)       Problem: Bathing       Dates: Start: 03/22/23         Goal: STG-Within one week, patient will bathe at CGA level using DME/AE PRN.       Dates: Start: 03/22/23               Problem: OT Long Term Goals       Dates: Start: 03/22/23         Goal: LTG-By discharge, patient will complete basic self care tasks at Min A-Mod I level using DME/AE PRN.       Dates: Start: 03/22/23            Goal: LTG-By discharge, patient will perform bathroom transfers at SBA-Mod I level using DME/AE PRN.       Dates: Start: 03/22/23            Goal: LTG-By discharge, patient will complete basic home management at Min A-Mod I level using DME/AE PRN.       Dates: Start: 03/22/23

## 2023-03-30 NOTE — PROGRESS NOTES
"  Physical Medicine & Rehabilitation Progress Note    Encounter Date: 3/30/2023    Chief Complaint: Decreased mobility, leg pain    Interval Events (Subjective):  Patient sitting up in room. He reports he is having a bad day. He reports depressed mood due to frustration at losing a leg. He reports he thinks they should have amputated both legs. He is still motivated to get home to his kids. Denies NVD.     _____________________________________  Interdisciplinary Team Conference   Most recent IDT on 3/30/2023    Keaton DREW M.D./Ph.D., was present and led the interdisciplinary team conference on 3/30/2023.  I led the IDT conference and agree with the IDT conference documentation and plan of care as noted below.     Nursing:  Diet Current Diet Order   Procedures    Diet Order Diet: Regular       Eating ADL Independent      % of Last Meal  Oral Nutrition: Breakfast, Between 25-50% Consumed   Sleep    Bowel Last BM: 03/26/23   Bladder Continent   Barriers to Discharge Home:  Left leg pain  Right foot pain  Rash    Physical Therapy:  Bed Mobility    Transfers Standby Assist  Increased time, Supervision for safety, Squat pivot transfer to wheelchair   Mobility Unable to Participate   Stairs    Barriers to Discharge Home:  Pain  Back, leg, wrist    Occupational Therapy:  Grooming Modified Independent, Seated   Bathing Minimal Assist (assist with washing buttocks)   UB Dressing Independent   LB Dressing Standby Assist (supine at bed)   Toileting Contact Guard Assist (urinated while seated on padded drop arm commode over toilet)   Shower & Transfer    Barriers to Discharge Home:  Limited uphill transfers    Case Management:  Continues to work on disposition and DME needs.      Discharge Date/Disposition:  4/11/23  _____________________________________      Objective:  VITAL SIGNS: /77   Pulse 64   Temp 36.6 °C (97.9 °F) (Oral)   Resp 18   Ht 1.8 m (5' 10.87\")   Wt 95.8 kg (211 lb 3.2 oz)   SpO2 " 92%   BMI 29.57 kg/m²   Gen: NAD  Psych: Mood and affect appropriate  CV: RRR, 1+ LLE edema  Resp: CTAB, no upper airway sounds  Abd: NTND  Neuro: AOx4, following commands    Laboratory Values:  Recent Results (from the past 72 hour(s))   CBC WITH DIFFERENTIAL    Collection Time: 03/29/23  5:29 AM   Result Value Ref Range    WBC 4.8 4.8 - 10.8 K/uL    RBC 3.46 (L) 4.70 - 6.10 M/uL    Hemoglobin 9.8 (L) 14.0 - 18.0 g/dL    Hematocrit 31.8 (L) 42.0 - 52.0 %    MCV 91.9 81.4 - 97.8 fL    MCH 28.3 27.0 - 33.0 pg    MCHC 30.8 (L) 33.7 - 35.3 g/dL    RDW 50.0 35.9 - 50.0 fL    Platelet Count 301 164 - 446 K/uL    MPV 9.0 9.0 - 12.9 fL    Neutrophils-Polys 37.10 (L) 44.00 - 72.00 %    Lymphocytes 49.30 (H) 22.00 - 41.00 %    Monocytes 8.60 0.00 - 13.40 %    Eosinophils 3.30 0.00 - 6.90 %    Basophils 1.30 0.00 - 1.80 %    Immature Granulocytes 0.40 0.00 - 0.90 %    Nucleated RBC 0.00 /100 WBC    Neutrophils (Absolute) 1.78 (L) 1.82 - 7.42 K/uL    Lymphs (Absolute) 2.36 1.00 - 4.80 K/uL    Monos (Absolute) 0.41 0.00 - 0.85 K/uL    Eos (Absolute) 0.16 0.00 - 0.51 K/uL    Baso (Absolute) 0.06 0.00 - 0.12 K/uL    Immature Granulocytes (abs) 0.02 0.00 - 0.11 K/uL    NRBC (Absolute) 0.00 K/uL   Basic Metabolic Panel    Collection Time: 03/29/23  5:29 AM   Result Value Ref Range    Sodium 138 135 - 145 mmol/L    Potassium 4.4 3.6 - 5.5 mmol/L    Chloride 103 96 - 112 mmol/L    Co2 27 20 - 33 mmol/L    Glucose 76 65 - 99 mg/dL    Bun 9 8 - 22 mg/dL    Creatinine 0.44 (L) 0.50 - 1.40 mg/dL    Calcium 8.4 (L) 8.5 - 10.5 mg/dL    Anion Gap 8.0 7.0 - 16.0   MAGNESIUM    Collection Time: 03/29/23  5:29 AM   Result Value Ref Range    Magnesium 1.9 1.5 - 2.5 mg/dL   ESTIMATED GFR    Collection Time: 03/29/23  5:29 AM   Result Value Ref Range    GFR (CKD-EPI) 132 >60 mL/min/1.73 m 2       Medications:  Scheduled Medications   Medication Dose Frequency    pregabalin  150 mg BID    pregabalin  200 mg QHS    hydrocortisone   TID    nicotine   7 mg Daily-0600    multivitamin  1 Tablet DAILY    morphine  30 mg TID    senna-docusate  2 Tablet BID    acetaminophen  650 mg Q6HRS    calcium citrate  950 mg DAILY    DULoxetine  60 mg DAILY    morphine ER  30 mg Q12HRS    omeprazole  40 mg DAILY    thiamine  100 mg DAILY    vitamin D2 (Ergocalciferol)  50,000 Units Q7 DAYS     PRN medications: sodium chloride, capsaicin, Respiratory Therapy Consult, hydrALAZINE, acetaminophen, senna-docusate **AND** polyethylene glycol/lytes **AND** magnesium hydroxide **AND** bisacodyl, mag hydrox-al hydrox-simeth, ondansetron **OR** ondansetron, traZODone, sodium chloride, carboxymethylcellulose, methocarbamol, mirtazapine, morphine, zolpidem    Diet:  Current Diet Order   Procedures    Diet Order Diet: Regular       Medical Decision Making and Plan:   L BKA - Patient with osteomyelitis while on DMARD for RA with multiple foot wounds. Patient is s/p left BKA on 3/9/23 with Dr. Chambers. NWB LLE. Needs IPOP vs extension brace.   -PT and OT for mobility and ADLs. Per guidelines, 15 hours per week between PT, OT and/or SLP.  -Follow-up Dr. Reyes CARVAJAL - Previously on medication. Currently on hold.      Tobacco Abuse - Patient on nicotine patch. Counseled      Anemia - Check AM CBC - 9.0 on admission, will monitor. Recheck 2/27 with Fe panel. Fe panel - low Iron. Start supplement. Repeat 9.8, will monitor     Hyponatremia - Check AM CMP - 132, will continue to monitor. Recheck 2/27 - 137, improved     Hypocalcemia - Check AM CMP - 8.3 on admission     Morbid obesity due to excess calories - BMI of 35.3 on admission, meets medical criteria. Dietitian to consult     Neuropathy - Patient on Lyrica TID. Continue Lyrica at current dose. Worsening night time shooting pain. Increase to 150/150/200 mg and check AM CBC/CMP/Mag. Labs stable, improved night time pain control. Continue Lyrica 150/150/200     Adjustment disorder with depression - Patient with adjustment disorder to new  disability. He is on Cymbalta 60 mg. Continue Cymbalta and consider increase    Pain - Patient on Morphine ER, Morphine IR PRN, Tylenol, and Lyrica  -Schedule Morphine , 1130, and 1500. Continue scheduled Morphine IR and ER as well as lyrica. Will recheck Cr 2/27 - stable Cr at 0.49     Skin - Patient at risk for skin breakdown due to debility in areas including sacrum, achilles, elbows and head in addition to other sites. Nursing to assess skin daily.   -Rash on 3/28, start hydrocortisone.      GI Ppx - Patient on Prilosec for GERD prophylaxis. Patient on Senna-docusate for constipation prophylaxis.   Last BM: 03/19/23     DVT Ppx - Patient refusing AC on transfer. Patient reports he is aware of risks.  ____________________________________    T. Loi Pisano MD./PhD.  [facility] - Physical Medicine & Rehabilitation   [facility] - Brain Injury Medicine   ____________________________________

## 2023-03-31 NOTE — PROGRESS NOTES
"  Physical Medicine & Rehabilitation Progress Note    Encounter Date: 3/31/2023    Chief Complaint: Decreased mobility, leg pain    Interval Events (Subjective):  Patient sitting up in room. He reports therapy is going well. Discussed case with Reyes and recommending sutures to stay in for 6 weeks. Patient reports understanding. Denies NVD.     _____________________________________  Interdisciplinary Team Conference   Most recent IDT on 3/30/2023    Discharge Date/Disposition:  4/11/23  _____________________________________      Objective:  VITAL SIGNS: /65   Pulse 73   Temp 36.5 °C (97.7 °F) (Temporal)   Resp 17   Ht 1.8 m (5' 10.87\")   Wt 95.8 kg (211 lb 3.2 oz)   SpO2 97%   BMI 29.57 kg/m²   Gen: NAD  Psych: Mood and affect appropriate  CV: RRR, 1+ LLE edema  Resp: CTAB, no upper airway sounds  Abd: NTND  Neuro: AOx4, following commands  Unchanged from 3/30/23    Laboratory Values:  Recent Results (from the past 72 hour(s))   CBC WITH DIFFERENTIAL    Collection Time: 03/29/23  5:29 AM   Result Value Ref Range    WBC 4.8 4.8 - 10.8 K/uL    RBC 3.46 (L) 4.70 - 6.10 M/uL    Hemoglobin 9.8 (L) 14.0 - 18.0 g/dL    Hematocrit 31.8 (L) 42.0 - 52.0 %    MCV 91.9 81.4 - 97.8 fL    MCH 28.3 27.0 - 33.0 pg    MCHC 30.8 (L) 33.7 - 35.3 g/dL    RDW 50.0 35.9 - 50.0 fL    Platelet Count 301 164 - 446 K/uL    MPV 9.0 9.0 - 12.9 fL    Neutrophils-Polys 37.10 (L) 44.00 - 72.00 %    Lymphocytes 49.30 (H) 22.00 - 41.00 %    Monocytes 8.60 0.00 - 13.40 %    Eosinophils 3.30 0.00 - 6.90 %    Basophils 1.30 0.00 - 1.80 %    Immature Granulocytes 0.40 0.00 - 0.90 %    Nucleated RBC 0.00 /100 WBC    Neutrophils (Absolute) 1.78 (L) 1.82 - 7.42 K/uL    Lymphs (Absolute) 2.36 1.00 - 4.80 K/uL    Monos (Absolute) 0.41 0.00 - 0.85 K/uL    Eos (Absolute) 0.16 0.00 - 0.51 K/uL    Baso (Absolute) 0.06 0.00 - 0.12 K/uL    Immature Granulocytes (abs) 0.02 0.00 - 0.11 K/uL    NRBC (Absolute) 0.00 K/uL   Basic Metabolic Panel    " Collection Time: 03/29/23  5:29 AM   Result Value Ref Range    Sodium 138 135 - 145 mmol/L    Potassium 4.4 3.6 - 5.5 mmol/L    Chloride 103 96 - 112 mmol/L    Co2 27 20 - 33 mmol/L    Glucose 76 65 - 99 mg/dL    Bun 9 8 - 22 mg/dL    Creatinine 0.44 (L) 0.50 - 1.40 mg/dL    Calcium 8.4 (L) 8.5 - 10.5 mg/dL    Anion Gap 8.0 7.0 - 16.0   MAGNESIUM    Collection Time: 03/29/23  5:29 AM   Result Value Ref Range    Magnesium 1.9 1.5 - 2.5 mg/dL   ESTIMATED GFR    Collection Time: 03/29/23  5:29 AM   Result Value Ref Range    GFR (CKD-EPI) 132 >60 mL/min/1.73 m 2       Medications:  Scheduled Medications   Medication Dose Frequency    pregabalin  150 mg BID    pregabalin  200 mg QHS    hydrocortisone   TID    nicotine  7 mg Daily-0600    multivitamin  1 Tablet DAILY    morphine  30 mg TID    senna-docusate  2 Tablet BID    acetaminophen  650 mg Q6HRS    calcium citrate  950 mg DAILY    DULoxetine  60 mg DAILY    morphine ER  30 mg Q12HRS    omeprazole  40 mg DAILY    thiamine  100 mg DAILY    vitamin D2 (Ergocalciferol)  50,000 Units Q7 DAYS     PRN medications: sodium chloride, capsaicin, Respiratory Therapy Consult, hydrALAZINE, acetaminophen, senna-docusate **AND** polyethylene glycol/lytes **AND** magnesium hydroxide **AND** bisacodyl, mag hydrox-al hydrox-simeth, ondansetron **OR** ondansetron, traZODone, sodium chloride, carboxymethylcellulose, methocarbamol, mirtazapine, morphine, zolpidem    Diet:  Current Diet Order   Procedures    Diet Order Diet: Regular       Medical Decision Making and Plan:   L BKA - Patient with osteomyelitis while on DMARD for RA with multiple foot wounds. Patient is s/p left BKA on 3/9/23 with Dr. Chambers. NWB LLE. Needs IPOP vs extension brace.   -PT and OT for mobility and ADLs. Per guidelines, 15 hours per week between PT, OT and/or SLP.  -Follow-up Dr. Chambers. Continue sutures for 6 weeks.      RA - Previously on medication. Currently on hold.      Tobacco Abuse - Patient on  nicotine patch. Counseled      Anemia - Check AM CBC - 9.0 on admission, will monitor. Recheck 2/27 with Fe panel. Fe panel - low Iron. Start supplement. Repeat 9.8, will monitor     Hyponatremia - Check AM CMP - 132, will continue to monitor. Recheck 2/27 - 137, improved     Hypocalcemia - Check AM CMP - 8.3 on admission     Morbid obesity due to excess calories - BMI of 35.3 on admission, meets medical criteria. Dietitian to consult     Neuropathy - Patient on Lyrica TID. Continue Lyrica at current dose. Worsening night time shooting pain. Increase to 150/150/200 mg and check AM CBC/CMP/Mag. Labs stable, improved night time pain control. Continue Lyrica 150-150-200. Recheck AM labs on 4/3/23.      Adjustment disorder with depression - Patient with adjustment disorder to new disability. He is on Cymbalta 60 mg. Continue Cymbalta and consider increase    Pain - Patient on Morphine ER, Morphine IR PRN, Tylenol, and Lyrica  -Schedule Morphine , 1130, and 1500. Continue scheduled Morphine IR and ER as well as lyrica. Will recheck Cr 2/27 - stable Cr at 0.49     Skin - Patient at risk for skin breakdown due to debility in areas including sacrum, achilles, elbows and head in addition to other sites. Nursing to assess skin daily.   -Rash on 3/28, start hydrocortisone.      GI Ppx - Patient on Prilosec for GERD prophylaxis. Patient on Senna-docusate for constipation prophylaxis.   Last BM: 03/19/23     DVT Ppx - Patient refusing AC on transfer. Patient reports he is aware of risks.  ____________________________________    T. Loi Pisano MD./PhD.  [facility] - Physical Medicine & Rehabilitation   [facility]   ____________________________________

## 2023-03-31 NOTE — THERAPY
Physical Therapy   Daily Treatment     Patient Name: Richard Hubbard II  Age:  47 y.o., Sex:  male  Medical Record #: [id number]  Today's Date: 3/31/2023     Precautions  Precautions: Fall Risk, Non Weight Bearing Left Lower Extremity, Weight Bearing As Tolerated Right Lower Extremity  Comments: L BKA, RA - L shoulder and R wrist exacerbation. R wrist brace, L knee immobilizer donned in bed as needed, and with ambulation; R foot wound with offloading shoe OOB    Subjective    Patient agreeable to session. Has no complaints.       Objective       03/31/23 0831   PT Charge Group   PT Gait Training (Units) 1   PT Therapeutic Activities (Units) 1   PT Total Time Spent   PT Individual Total Time Spent (Mins) 30   Gait Functional Level of Assist    Gait Level Of Assist Contact Guard Assist   Assistive Device Parallel Bars   Distance (Feet)   (6ft, 4ft, 2ft)   # of Times Distance was Traveled 1   Deviation   (hop to pattern, cues for increased UE extension into bars)   Wheelchair Functional Level of Assist   Wheelchair Assist Supervised   Distance Wheelchair (Feet or Distance) 125   Wheelchair Description Extra time;Supervision for safety   Bed Mobility    Sit to Stand Standby Assist  (Ascent from w/c arms inside parallel bars, descent using parallel bars to lower)   Interdisciplinary Plan of Care Collaboration   IDT Collaboration with  Nursing;Occupational Therapist   Patient Position at End of Therapy Seated   Collaboration Comments med pass, handoff to OT     Adjusted parallel bars to facilitate improved UE leverage/position during hopping, and demonstrates improved foot clearance with technique.     Bwd gait x 6 ft w/ CGA.     Pt self manages w/c components and ramp up to parallel bars.     Assessment    Pt very excited about improved hopping pattern and foot clearance. Demos improved leverage and UE use after verbal cues.     Strengths: Able to follow instructions, Effective communication skills, Good insight into  deficits/needs, Independent prior level of function  Barriers: Fatigue, Generalized weakness, Home accessibility, Impaired activity tolerance, Impaired balance, Pain, Pain poorly managed, Pressure ulcer    Plan    Seated dynamic balance, BLE strengthening, prone lying 20-30minutes per day (patient able to perform independently), HEP for LE strengthening, bed mobility, transfers squat pivot, standing tolerance/balance with LRAD, w/c mobility/ramp negotiation, contracture prevention, residual limb desensitization, Independent residual limb wrapping, Prosthetic and amputee education     Passport items to be completed:  Get in/out of bed safely, in/out of a vehicle, safely use mobility device, walk or wheel around home/community, navigate up and down stairs, show how to get up/down from the ground, ensure home is accessible, demonstrate HEP, complete caregiver training    Physical Therapy Problems (Active)       Problem: PT-Long Term Goals       Dates: Start: 03/22/23         Goal: LTG-By discharge, patient will propel wheelchair household distances with Mariano.        Dates: Start: 03/22/23            Goal: LTG-By discharge, patient will perform home exercise program independently.        Dates: Start: 03/22/23            Goal: LTG-By discharge, patient will transfer in/out of a car with supervision.        Dates: Start: 03/22/23            Goal: LTG-By discharge, patient will complete bed mobility with Mariano.        Dates: Start: 03/22/23            Goal: LTG-By discharge, patient will complete w/c to bed transfer using squat pivot vs slide board Mariano.        Dates: Start: 03/22/23

## 2023-03-31 NOTE — CARE PLAN
"The patient is Stable - Low risk of patient condition declining or worsening    Shift Goals  Clinical Goals: Safety  Patient Goals: pain control, rest    Problem: Skin Integrity  Goal: Skin integrity is maintained or improved  Outcome: Progressing  Note:   Nam Score: 17    Patient's skin remains intact and free from new or accidental injury this shift; no s/s of infection. RN wound protocol checked. Encouraged hydration and educated about the importance of nutrition to keep skin integrity. Will continue to monitor.       Problem: Fall Risk - Rehab  Goal: Patient will remain free from falls  Outcome: Progressing  Note: Osiris Healy Fall risk Assessment Score: 14    Moderate fall risk Interventions  - Bed and strip alarm   - Yellow sign by the door   - Yellow wrist band \"Fall risk\"  - Room near to the nurse station  - Do not leave patient unattended in the bathroom  - Fall risk education provided     "

## 2023-03-31 NOTE — PROGRESS NOTES
"REHABILITATION PSYCHOLOGY FOLLOW-UP:  Reason for admission: Sepsis ([facility]) [A41.9]  S/P BKA (below knee amputation) unilateral, left ([facility]) [Z89.512]  Length of Visit: 19 minutes    Chief Complaint: pain    S: Met with the patient for brief individual psychotherapy. Patient presented with a euthymic affect and reported a \"doing pretty good\" mood. Session focused on psychoeducation regarding behavioral strategies for pain management as well as values identification to help increase engagement with therapy staff. Will continue to follow.    O: Psychiatric Examination:  Vitals: Blood pressure 104/69, pulse 69, temperature 36.5 °C (97.7 °F), temperature source Temporal, resp. rate 18, height 1.8 m (5' 10.87\"), weight 95.8 kg (211 lb 3.2 oz), SpO2 98 %.  Musculoskeletal: Laying in bed normal psychomotor activity, no tics or unusual mannerisms noted  Appearance and Eye Contact: easily established rapport appropriate dress and grooming. Behavior is calm, cooperative,  appropriate eye contact  Attention/Alertness: Alert  Thought Process: Linear, Logical, and Goal Directed    Thought Content: No psychotic processes noted  Speech: Clear with normal rate and rhythm  Mood: \"doing pretty well overall\"            Affect: euthymic         SI/HI: Endorses    Memory: Recent and remote memory appear intact    Orientation: alert, oriented to person, place and time  Insight into symptoms: within normal limits  Judgement into symptoms:within normal limits    ASSESSMENT: Richard Hubbard II is a 47 y.o. male with a history of rheumatoid arthritis, deformities, foot ulcers who presented to the hospital on 2/28/2023 with evidence of osteomyelitis resulting in altered mental left BKA due to infection.  Psychology was consulted due to history of bipolar disorder and depression, as well as difficulty with adjustment to BKA and difficulty with pain management.  Session focused on assessment of current functioning as well as psychoeducation " regarding common cognitive and emotional impact of adjustment to BKA as well as hospitalization.  Patient self-reports a history of bipolar disorder diagnosis, denies any manic episode history.  Endorses significant depression history.  Denies being currently medicated for these symptoms.  Session focused on behavioral strategies for pain management as well as values identification.     DSM5 Diagnostic Considerations: Adjustment disorder with mixed anxiety and depressed mood        PLAN:  Records reviewed: Yes  Discussed patient with other provider: Norris  We will continue to follow  Thank you for the consult.     Ghazal Lozoya, Ph.D

## 2023-03-31 NOTE — THERAPY
"Occupational Therapy  Daily Treatment     Patient Name: Richard Hubbard II  Age:  47 y.o., Sex:  male  Medical Record #: [id number]  Today's Date: 3/31/2023     Precautions  Precautions: Fall Risk, Non Weight Bearing Left Lower Extremity, Weight Bearing As Tolerated Right Lower Extremity  Comments: L BKA, RA - L shoulder and R wrist exacerbation. R wrist brace, L knee immobilizer donned in bed for ambulation and standing transfers; R foot wound with offloading shoe OOB         Subjective    Pt seen 2x this day as listed and detailed below. During second session pt reporting not feeling well with nursing notified that pt felt \"congested\". RN to follow up with MD.     Objective       03/31/23 0901 03/31/23 1401   OT Charge Group   OT Self Care / ADL (Units) 1  --    OT Therapy Activity (Units) 3 1   OT Therapeutic Exercise (Units)  --  1   OT Total Time Spent   OT Individual Total Time Spent (Mins) 60 30   Functional Level of Assist   Lower Body Dressing Contact Guard Assist  --    Lower Body Dressing Description Grab bar;Assistive devices;Increased time;Supervision for safety;Verbal cueing  (CGA for balance during hip hike with shorts only. Pt able to don/doff LLE immobilizer/iPOP)  --    Toileting Contact Guard Assist  --    Toileting Description Grab bar;Adaptive equipment;Assist for standing balance;Supervision for safety;Verbal cueing  (CGA during clothing management.)  --    Toilet Transfers Minimal Assist  --    Toilet Transfer Description Adaptive equipment;Increased time;Supervision for safety;Verbal cueing;Set-up of equipment  (FWW for stand pivot w/c<>raised padded commode over toilet.)  --    Sitting Upper Body Exercises   Sitting Upper Body Exercises  --  Yes   Chest Fly  --  1 set of 10;Bilateral;Medium Resistance Theraband   Bicep Curls  --  1 set of 10;Bilateral;Medium Resistance Theraband   Tricep Press  --  1 set of 10;Bilateral;Medium Resistance Theraband   IADL Treatments   IADL Treatments  --  " "Home management   Home Management  --  Pt completed laundry task at w/c level.  Pt loaded washer and manage washer dials with reacher at SBA.   Interdisciplinary Plan of Care Collaboration   IDT Collaboration with  Physical Therapist Physical Therapist   Patient Position at End of Therapy Seated;Self Releasing Lap Belt Applied;Call Light within Reach;Tray Table within Reach Seated;Self Releasing Lap Belt Applied  (Pt left seated in main gym awaiting PT.)   Collaboration Comments POC, CLOF, home setup re: laundry, pt location                                                                 The above displayed photos were provided by pt on his phone and indicate pt's home environment. Pt is currently seated in a 19\" wide w/c, though insurance will only provide an 18\" or 20\" wide. Full measurement including wheel base would be 24.5\" or 27\" wide. PT to trial each, see notes for details. Based on measurements and pictures, w/c width will determine pt's accessibility to the rooms in his home. He will likely require FWW for stand pivot transfer to toilet in master bathroom, and possibly removal of master bed frame if he is unable to access guest bedroom. Pt trialed toilet transfer as listed and detailed above.    Assessment    Pt tolerated OT tx fairly well with focus on functional transfers, therex, and home setup. Further problem solving required for ensuring ease of access from w/c level upon DC.  Strengths: Able to follow instructions, Alert and oriented, Effective communication skills, Good insight into deficits/needs, Independent prior level of function, Motivated for self care and independence, Pleasant and cooperative, Supportive family, Willingly participates in therapeutic activities  Barriers: Decreased endurance, Fatigue, Generalized weakness, Home accessibility, Impaired activity tolerance, Impaired balance, Limited mobility, Pain, Pain poorly managed    Plan    BUE strengthening within reduced pain ranges, " sitting balance    Shower on Monday for Tuesday conferences, please.    Passport items to be completed:  Perform bathroom transfers, complete dressing, complete feeding, get ready for the day, prepare a simple meal, participate in household tasks, adapt home for safety needs, demonstrate home exercise program, complete caregiver training     Occupational Therapy Goals (Active)       Problem: Bathing       Dates: Start: 03/22/23         Goal: STG-Within one week, patient will bathe at CGA level using DME/AE PRN.       Dates: Start: 03/22/23               Problem: OT Long Term Goals       Dates: Start: 03/22/23         Goal: LTG-By discharge, patient will complete basic self care tasks at Min A-Mod I level using DME/AE PRN.       Dates: Start: 03/22/23            Goal: LTG-By discharge, patient will perform bathroom transfers at SBA-Mod I level using DME/AE PRN.       Dates: Start: 03/22/23            Goal: LTG-By discharge, patient will complete basic home management at Min A-Mod I level using DME/AE PRN.       Dates: Start: 03/22/23

## 2023-03-31 NOTE — THERAPY
Physical Therapy   Daily Treatment     Patient Name: Richard Hubbard II  Age:  47 y.o., Sex:  male  Medical Record #: [id number]  Today's Date: 3/31/2023     Precautions  Precautions: (P) Fall Risk, Non Weight Bearing Left Lower Extremity, Weight Bearing As Tolerated Right Lower Extremity  Comments: (P) L BKA, RA - L shoulder and R wrist exacerbation. R wrist brace, L knee immobilizer donned in bed for ambulation and standing transfers; R foot wound with offloading shoe OOB    Subjective    Patient asleep in bed on arrival, agreeable to PT session.      Objective       03/31/23 1101   Precautions   Precautions Fall Risk;Non Weight Bearing Left Lower Extremity;Weight Bearing As Tolerated Right Lower Extremity   Comments L BKA, RA - L shoulder and R wrist exacerbation. R wrist brace, L knee immobilizer donned in bed for ambulation and standing transfers; R foot wound with offloading shoe OOB   Wheelchair Functional Level of Assist   Wheelchair Assist Supervised   Distance Wheelchair (Feet or Distance) 120  (2x)   Wheelchair Description Extra time;Limited by fatigue;Verbal cueing  (Extra time for leg rest management, BUE propulsion)   Transfer Functional Level of Assist   Bed, Chair, Wheelchair Transfer Standby Assist   Bed Chair Wheelchair Transfer Description Increased time;Squat pivot transfer to wheelchair;Supervision for safety;Verbal cueing  (to L)   Bed Mobility    Supine to Sit Supervised   Sit to Stand Standby Assist  (Ascent from w/c arms inside parallel bars, descent using RUE on parallel bar and LUE on w/c armrest to lower)   Interdisciplinary Plan of Care Collaboration   IDT Collaboration with  Physical Therapist;Occupational Therapist   Patient Position at End of Therapy Seated;Self Releasing Lap Belt Applied  (Patient independent return to room after session)   Collaboration Comments POC, CLOF, immobilizer use for transfers     Discussed patient w/c recommendations for discharge and patient  "preferences.     Sit <> stand progression in parallel bars, patient using RUE on parallel bars, and LUE on w/c arm rest - 1 x 5 with SBA; requiring increasing cues for anterior weight shift with fatigue.    Assessment    Patient tolerated treatment well. Discussed patient's w/c preferences, noting he would like to have an 18\" w/c, however he reports he thinks he got his current w/c through insurance in [phone]. Patient reports he will ask wife to bring his w/c in and will confirm when/how it was purchased. Patient continues to have difficulty initiating concentric R quadriceps contraction from seated position, requiring max BUE for ascent and decreasing his ability to anteriorly weight shift for sit to stand.    Strengths: Able to follow instructions, Effective communication skills, Good insight into deficits/needs, Independent prior level of function  Barriers: Fatigue, Generalized weakness, Home accessibility, Impaired activity tolerance, Impaired balance, Pain, Pain poorly managed, Pressure ulcer    Plan    Shuttle, piriformis stretch L side, Seated dynamic balance, BLE strengthening, prone lying 20-30minutes per day (patient able to perform independently), HEP for LE strengthening, bed mobility, transfers squat pivot, standing tolerance/balance with LRAD, w/c mobility/ramp negotiation, contracture prevention, residual limb desensitization, Independent residual limb wrapping, Prosthetic and amputee education     Passport items to be completed:  Get in/out of bed safely, in/out of a vehicle, safely use mobility device, walk or wheel around home/community, navigate up and down stairs, show how to get up/down from the ground, ensure home is accessible, demonstrate HEP, complete caregiver training    Physical Therapy Problems (Active)       Problem: PT-Long Term Goals       Dates: Start: 03/22/23         Goal: LTG-By discharge, patient will propel wheelchair household distances with Mariano.        Dates: Start: " 03/22/23            Goal: LTG-By discharge, patient will perform home exercise program independently.        Dates: Start: 03/22/23            Goal: LTG-By discharge, patient will transfer in/out of a car with supervision.        Dates: Start: 03/22/23            Goal: LTG-By discharge, patient will complete bed mobility with Mariano.        Dates: Start: 03/22/23            Goal: LTG-By discharge, patient will complete w/c to bed transfer using squat pivot vs slide board Mariano.        Dates: Start: 03/22/23

## 2023-03-31 NOTE — THERAPY
"Physical Therapy   Daily Treatment     Patient Name: Richard Hubbard II  Age:  47 y.o., Sex:  male  Medical Record #: [id number]  Today's Date: 3/31/2023     Precautions  Precautions: Fall Risk, Non Weight Bearing Left Lower Extremity, Weight Bearing As Tolerated Right Lower Extremity  Comments: L BKA, RA - L shoulder and R wrist exacerbation. R wrist brace, L knee immobilizer donned in bed for ambulation and standing transfers; R foot wound with offloading shoe OOB    Subjective    \"I'm tired but I'll do whatever you have planned\"     Objective       03/31/23 1430   PT Charge Group   PT Therapeutic Exercise (Units) 1   PT Therapeutic Activities (Units) 1   PT Total Time Spent   PT Individual Total Time Spent (Mins) 30   Wheelchair Functional Level of Assist   Wheelchair Assist Supervised   Distance Wheelchair (Feet or Distance) 120   Wheelchair Description Extra time  (VC's for efficient propulsion and turning with smaller radius)   Transfer Functional Level of Assist   Bed, Chair, Wheelchair Transfer Minimal Assist   Bed Chair Wheelchair Transfer Description Supervision for safety;Verbal cueing;Set-up of equipment  (squat pivot to shuttle)   Bed Mobility    Sit to Supine Supervised   Interdisciplinary Plan of Care Collaboration   IDT Collaboration with  Physical Therapist   Patient Position at End of Therapy In Bed;Bed Alarm On;Self Releasing Lap Belt Applied;Call Light within Reach;Phone within Reach   Collaboration Comments POC     Completed shuttle squats R LE only with L knee,hip flexion to 90/90 2x10 4-band resistance and 1x15 5-band resistance    Educated pt on efficiency of WC propulsion and technique for smaller radius turning    Assessment    Pt was limited by fatigue but was agreeable to participate. He required more assist than typical for squat pivot transfers to get onto shuttle going to the right due to height differential and fatigue. Pt is very motivated to participate and continue to work with " physical therapy.  Strengths: Able to follow instructions, Effective communication skills, Good insight into deficits/needs, Independent prior level of function  Barriers: Fatigue, Generalized weakness, Home accessibility, Impaired activity tolerance, Impaired balance, Pain, Pain poorly managed, Pressure ulcer    Plan      Trial 18in W WC with swing away armrests- refer to OT note from 3/31 for details regarding home set up    Shuttle, piriformis stretch L side, Seated dynamic balance, BLE strengthening, prone lying 20-30minutes per day (patient able to perform independently), HEP for LE strengthening, bed mobility, transfers squat pivot, standing tolerance/balance with LRAD, w/c mobility/ramp negotiation, contracture prevention, residual limb desensitization, Independent residual limb wrapping, Prosthetic and amputee education     Passport items to be completed:  Get in/out of bed safely, in/out of a vehicle, safely use mobility device, walk or wheel around home/community, navigate up and down stairs, show how to get up/down from the ground, ensure home is accessible, demonstrate HEP, complete caregiver training      Physical Therapy Problems (Active)       Problem: PT-Long Term Goals       Dates: Start: 03/22/23         Goal: LTG-By discharge, patient will propel wheelchair household distances with Mariano.        Dates: Start: 03/22/23            Goal: LTG-By discharge, patient will perform home exercise program independently.        Dates: Start: 03/22/23            Goal: LTG-By discharge, patient will transfer in/out of a car with supervision.        Dates: Start: 03/22/23            Goal: LTG-By discharge, patient will complete bed mobility with Mariano.        Dates: Start: 03/22/23            Goal: LTG-By discharge, patient will complete w/c to bed transfer using squat pivot vs slide board Mariano.        Dates: Start: 03/22/23

## 2023-04-01 NOTE — CARE PLAN
Problem: Knowledge Deficit - Standard  Goal: Patient and family/care givers will demonstrate understanding of plan of care, disease process/condition, diagnostic tests and medications  Outcome: Progressing     Problem: Skin Integrity  Goal: Skin integrity is maintained or improved  Outcome: Progressing   The patient is Stable - Low risk of patient condition declining or worsening    Shift Goals  Clinical Goals: Safety  Patient Goals: pain control, rest

## 2023-04-02 NOTE — THERAPY
Recreational Therapy  Daily Treatment     Patient Name: Richard Hubbard II  AGE:  47 y.o., SEX:  male  Medical Record #: [id number]  Today's Date: 4/2/2023       Subjective    Patient was ready and agreeable to recreation therapy session.      Objective       04/02/23 1331   Procedural Tracking   Procedural Tracking Community Re-Integration;Community Skills Development;Leisure Skills Awareness;Social Skills Training;Gross Motor Functional Leisure Skills;Cognitive Skills Training;Leisure Skills Development;Group Treatment;Fine Motor Functional Leisure Skills   Treatment Time   Total Time Spent (mins) 60   Total Time Missed 0   Functional Ability Status - Cognitive   Attention Span Remains on Task   Comprehension Follows One Step Commands;Follows Two Step Commands   Judgment Able to Make Independent Decisions   Functional Ability Status - Emotional    Affect Appropriate   Mood Appropriate   Behavior Appropriate;Cooperative   Skilled Intervention    Skilled Intervention Fine Motor Leisure;Gross Motor Leisure;Cognitive Leisure;Leisure Education ;Group Participation   Interdisciplinary Plan of Care Collaboration   IDT Collaboration with  Therapy Tech   Patient Position at End of Therapy Seated;In Bed;Phone within Reach;Call Light within Reach;Tray Table within Reach   Strengths & Barriers   Strengths Able to follow instructions;Alert and oriented;Willingly participates in therapeutic activities;Pleasant and cooperative;Motivated for self care and independence;Making steady progress towards goals   Barriers Decreased endurance;Fatigue;Generalized weakness;Impaired activity tolerance;Limited mobility;Pain         Assessment    Patient participated in group game, Retewi, during recreation therapy session. Patient required mod cues for scanning, sequencing, rule recall and divided attention. Patient was mod I for fine and gross motor aspects of game play. Patient benefited from reduced options when scanning and  sequencing tiles.     Strengths: (P) Able to follow instructions, Alert and oriented, Willingly participates in therapeutic activities, Pleasant and cooperative, Motivated for self care and independence, Making steady progress towards goals  Barriers: (P) Decreased endurance, Fatigue, Generalized weakness, Impaired activity tolerance, Limited mobility, Pain    Plan    Patient will benefit from continued recreation therapy sessions.     Passport items to be completed:  Verbalize two positive leisure activities, discuss returning to work, hobbies, community groups or volunteer activities, explore community resources

## 2023-04-02 NOTE — CARE PLAN
Problem: Pain - Standard  Goal: Alleviation of pain or a reduction in pain to the patient’s comfort goal  Note: Pt able to participate in therapies and activities this shift. Patient takes scheduled morphine and Tylenol for pain. Pt complains of pain to left BKA, right foot, and back. Heat pack offered and pt accepted. Pt rested in bed. Will continue to monitor.      Problem: Skin Integrity  Goal: Skin integrity is maintained or improved  Note: Patient's skin is maintained and free from new or accidental injury this shift.  Will continue to monitor.    The patient is Watcher - Medium risk of patient condition declining or worsening    Shift Goals  Clinical Goals: Safety  Patient Goals: pain control, rest

## 2023-04-03 NOTE — THERAPY
Physical Therapy   Daily Treatment     Patient Name: Richard Hubbard II  Age:  47 y.o., Sex:  male  Medical Record #: [id number]  Today's Date: 4/3/2023     Precautions  Precautions: Fall Risk, Non Weight Bearing Left Lower Extremity, Weight Bearing As Tolerated Right Lower Extremity  Comments: L BKA, RA - L shoulder and R wrist exacerbation. R wrist brace, L knee immobilizer donned in bed for ambulation and standing transfers; R foot wound with offloading shoe OOB    Subjective    Pt ready for therapy     Objective       04/03/23 1101   PT Charge Group   PT Therapeutic Activities (Units) 2   Supervising Physical Therapist Lorraine Healy DPT   PT Total Time Spent   PT Individual Total Time Spent (Mins) 30   Pain   Intervention Declines   Wheelchair Functional Level of Assist   Wheelchair Assist Supervised   Distance Wheelchair (Feet or Distance) 130   Wheelchair Description Extra time;Verbal cueing;Supervision for safety  (vc for)   Transfer Functional Level of Assist   Bed, Chair, Wheelchair Transfer Contact Guard Assist   Bed Chair Wheelchair Transfer Description Increased time;Supervision for safety   Interdisciplinary Plan of Care Collaboration   Patient Position at End of Therapy Seated;Other (Comments)  (self propelled to room in wc, indirect SPV with vc semi circular wc propulsion technique)     Provided amputee coalition booklet and handout for wrapping and residual limb care    Completed edu on desensitization techniques and demonstrated residual limb wrapping to the patient using figure 8 technique    Assessment    Pt requires additional demo and instructions on residual limb wrapping, using tech-back method  Strengths: Able to follow instructions, Effective communication skills, Good insight into deficits/needs, Independent prior level of function  Barriers: Fatigue, Generalized weakness, Home accessibility, Impaired activity tolerance, Impaired balance, Pain, Pain poorly managed, Pressure  ulcer    Plan    Trial 18in W WC with swing away armrests- refer to OT note from 3/31 for details regarding home set up     Shuttle, piriformis stretch L side, Seated dynamic balance, BLE strengthening, prone lying 20-30minutes per day (patient able to perform independently), HEP for LE strengthening, bed mobility, transfers squat pivot, standing tolerance/balance with LRAD, w/c mobility/ramp negotiation, contracture prevention, residual limb desensitization, Independent residual limb wrapping, Prosthetic and amputee education     Passport items to be completed:  Get in/out of bed safely, in/out of a vehicle, safely use mobility device, walk or wheel around home/community, navigate up and down stairs, show how to get up/down from the ground, ensure     Physical Therapy Problems (Active)       Problem: PT-Long Term Goals       Dates: Start: 03/22/23         Goal: LTG-By discharge, patient will propel wheelchair household distances with Mariano.        Dates: Start: 03/22/23            Goal: LTG-By discharge, patient will perform home exercise program independently.        Dates: Start: 03/22/23            Goal: LTG-By discharge, patient will transfer in/out of a car with supervision.        Dates: Start: 03/22/23            Goal: LTG-By discharge, patient will complete bed mobility with Mariano.        Dates: Start: 03/22/23            Goal: LTG-By discharge, patient will complete w/c to bed transfer using squat pivot vs slide board Mariano.        Dates: Start: 03/22/23

## 2023-04-03 NOTE — DISCHARGE PLANNING
"CM  Met with pt re dc disposition; pt aware of his appt w/ Dr De Guzman on 4/19 - he pt states his wife has FMLA has but has used all of her sick/vacation time; he also reports she is having \"a surgery\" and will not be able to assist at home for about a week;  friends will be able to assist at times; pt's mother currently hospitalized; recent spine surgery - out of ICU but still hospitalized; pt feeling overwhelmed about situation/help at home; he confirms ramp built today; he wants a \"motorized scooter\" for mobility; I advised him this item is not usually recommended for L BKA and therapist/md have not recommended this item. Will discuss w/ team tomorrow.     "

## 2023-04-03 NOTE — PROGRESS NOTES
"  Physical Medicine & Rehabilitation Progress Note    Encounter Date: 4/3/2023    Chief Complaint: Decreased mobility, leg pain    Interval Events (Subjective):  Patient sitting up in room. He reports the weekend went well. He reports ongoing neuropathic pain. He reports in the past he was on Gabapentin as well as Lyrica. Discussed AM labs including stable Cr and improving Hgb. Discussed would increase Lyrica    _____________________________________  Interdisciplinary Team Conference   Most recent IDT on 3/30/2023    Discharge Date/Disposition:  4/11/23  _____________________________________      Objective:  VITAL SIGNS: /72   Pulse 96   Temp 36.1 °C (97 °F)   Resp 18   Ht 1.8 m (5' 10.87\")   Wt 95.8 kg (211 lb 3.2 oz)   SpO2 96%   BMI 29.57 kg/m²   Gen: NAD  Psych: Mood and affect appropriate  CV: RRR, 0 edema  Resp: CTAB, no upper airway sounds  Abd: NTND  Neuro: AOx4, following commands    Laboratory Values:  Recent Results (from the past 72 hour(s))   CBC WITH DIFFERENTIAL    Collection Time: 04/03/23  5:25 AM   Result Value Ref Range    WBC 5.7 4.8 - 10.8 K/uL    RBC 3.61 (L) 4.70 - 6.10 M/uL    Hemoglobin 10.3 (L) 14.0 - 18.0 g/dL    Hematocrit 33.7 (L) 42.0 - 52.0 %    MCV 93.4 81.4 - 97.8 fL    MCH 28.5 27.0 - 33.0 pg    MCHC 30.6 (L) 33.7 - 35.3 g/dL    RDW 50.4 (H) 35.9 - 50.0 fL    Platelet Count 292 164 - 446 K/uL    MPV 9.5 9.0 - 12.9 fL    Neutrophils-Polys 44.70 44.00 - 72.00 %    Lymphocytes 42.90 (H) 22.00 - 41.00 %    Monocytes 9.20 0.00 - 13.40 %    Eosinophils 1.70 0.00 - 6.90 %    Basophils 1.20 0.00 - 1.80 %    Immature Granulocytes 0.30 0.00 - 0.90 %    Nucleated RBC 0.00 /100 WBC    Neutrophils (Absolute) 2.55 1.82 - 7.42 K/uL    Lymphs (Absolute) 2.46 1.00 - 4.80 K/uL    Monos (Absolute) 0.53 0.00 - 0.85 K/uL    Eos (Absolute) 0.10 0.00 - 0.51 K/uL    Baso (Absolute) 0.07 0.00 - 0.12 K/uL    Immature Granulocytes (abs) 0.02 0.00 - 0.11 K/uL    NRBC (Absolute) 0.00 K/uL   Basic " Metabolic Panel    Collection Time: 04/03/23  5:25 AM   Result Value Ref Range    Sodium 138 135 - 145 mmol/L    Potassium 4.5 3.6 - 5.5 mmol/L    Chloride 104 96 - 112 mmol/L    Co2 26 20 - 33 mmol/L    Glucose 82 65 - 99 mg/dL    Bun 9 8 - 22 mg/dL    Creatinine 0.55 0.50 - 1.40 mg/dL    Calcium 8.6 8.5 - 10.5 mg/dL    Anion Gap 8.0 7.0 - 16.0   MAGNESIUM    Collection Time: 04/03/23  5:25 AM   Result Value Ref Range    Magnesium 1.7 1.5 - 2.5 mg/dL   ESTIMATED GFR    Collection Time: 04/03/23  5:25 AM   Result Value Ref Range    GFR (CKD-EPI) 123 >60 mL/min/1.73 m 2       Medications:  Scheduled Medications   Medication Dose Frequency    docusate sodium  10 mg TID    pregabalin  150 mg DAILY    pregabalin  200 mg BID    hydrocortisone   TID    nicotine  7 mg Daily-0600    multivitamin  1 Tablet DAILY    morphine  30 mg TID    senna-docusate  2 Tablet BID    acetaminophen  650 mg Q6HRS    calcium citrate  950 mg DAILY    DULoxetine  60 mg DAILY    morphine ER  30 mg Q12HRS    omeprazole  40 mg DAILY    thiamine  100 mg DAILY    vitamin D2 (Ergocalciferol)  50,000 Units Q7 DAYS     PRN medications: sodium chloride, capsaicin, Respiratory Therapy Consult, hydrALAZINE, acetaminophen, senna-docusate **AND** polyethylene glycol/lytes **AND** magnesium hydroxide **AND** bisacodyl, mag hydrox-al hydrox-simeth, ondansetron **OR** ondansetron, traZODone, sodium chloride, carboxymethylcellulose, methocarbamol, mirtazapine, morphine, zolpidem    Diet:  Current Diet Order   Procedures    Diet Order Diet: Regular       Medical Decision Making and Plan:   L BKA - Patient with osteomyelitis while on DMARD for RA with multiple foot wounds. Patient is s/p left BKA on 3/9/23 with Dr. Chambers. NWB LLE. Needs IPOP vs extension brace.   -PT and OT for mobility and ADLs. Per guidelines, 15 hours per week between PT, OT and/or SLP.  -Follow-up Dr. Chambers. Continue sutures for 6 weeks.      RA - Previously on medication. Currently  on hold.      Tobacco Abuse - Patient on nicotine patch. Counseled      Anemia - Check AM CBC - 9.0 on admission, will monitor. Recheck 2/27 with Fe panel. Fe panel - low Iron. Start supplement. Repeat 9.8, will monitor     Hyponatremia - Check AM CMP - 132, will continue to monitor. Recheck 2/27 - 137, improved     Hypocalcemia - Check AM CMP - 8.3 on admission     Morbid obesity due to excess calories - BMI of 35.3 on admission, meets medical criteria. Dietitian to consult     Neuropathy - Patient on Lyrica TID. Continue Lyrica at current dose. Worsening night time shooting pain. Increase to 150/150/200 mg and check AM CBC/CMP/Mag. Labs stable, improved night time pain control. Continue Lyrica 150-150-200. Recheck AM labs on 4/3/23. Stable Cr increase to 200 mg TID   Gabapentin and Pregabalin are high risk medications in the class of MICHAEL alpha 2 receptor agonists and cause a decrease in pain, particularly neuropathic pain. Patient must be monitored as these medications have been found to be heavily affected by the kidney clearance at any dose especially in combination with opiates and/or benzodiazepines. Side effects at higher doses or with a decrease in kidney function can include encephalopathy, sedation, respiratory depression, and increased suicidality.  Will check vital signs, monitor GFR biweekly, and monitor cognitive status as well as mood.    Adjustment disorder with depression - Patient with adjustment disorder to new disability. He is on Cymbalta 60 mg. Continue Cymbalta and consider increase    Pain - Patient on Morphine ER, Morphine IR PRN, Tylenol, and Lyrica  -Schedule Morphine , 1130, and 1500. Continue scheduled Morphine IR and ER as well as lyrica. Will recheck Cr 2/27 - stable Cr at 0.49     Skin - Patient at risk for skin breakdown due to debility in areas including sacrum, achilles, elbows and head in addition to other sites. Nursing to assess skin daily.   -Rash on 3/28, start  hydrocortisone.      GI Ppx - Patient on Prilosec for GERD prophylaxis. Patient on Senna-docusate for constipation prophylaxis.      DVT Ppx - Patient refusing AC on transfer. Patient reports he is aware of risks.  ____________________________________    T. Loi Pisano MD./PhD.  [facility] - Physical Medicine & Rehabilitation   [facility] - Brain Injury Medicine   ____________________________________

## 2023-04-03 NOTE — PROGRESS NOTES
Patient care assumed. Report received from [facility] BARBARA Manzo. Patient is alert and calm, resting in bed. Call light and bedside table within reach. Will continue to monitor.

## 2023-04-03 NOTE — THERAPY
Physical Therapy   Daily Treatment     Patient Name: Richard Hubbard II  Age:  47 y.o., Sex:  male  Medical Record #: [id number]  Today's Date: 4/3/2023     Precautions  Precautions: (P) Fall Risk, Non Weight Bearing Left Lower Extremity, Weight Bearing As Tolerated Right Lower Extremity  Comments: (P) L BKA, RA - L shoulder and R wrist exacerbation. R and L wrist brace, L knee immobilizer donned in bed for ambulation and standing transfers; R foot wound with offloading shoe OOB    Subjective    Patient seated in room in w/c, agreeable to therapy.      Objective       04/03/23 1401   Precautions   Precautions Fall Risk;Non Weight Bearing Left Lower Extremity;Weight Bearing As Tolerated Right Lower Extremity   Comments L BKA, RA - L shoulder and R wrist exacerbation. R and L wrist brace, L knee immobilizer donned in bed for ambulation and standing transfers; R foot wound with offloading shoe OOB   Transfer Functional Level of Assist   Bed, Chair, Wheelchair Transfer Standby Assist   Bed Chair Wheelchair Transfer Description Increased time;Squat pivot transfer to wheelchair;Supervision for safety   Bed Mobility    Sit to Stand Contact Guard Assist  (Ascent from w/c arms inside parallel bars, descent using RUE on parallel bar and LUE on w/c armrest to lower)   Interdisciplinary Plan of Care Collaboration   IDT Collaboration with  Nursing   Patient Position at End of Therapy Seated;Call Light within Reach;Tray Table within Reach;Phone within Reach   Collaboration Comments Medications, CLOF     Gait training: Performed gait training in parallel bars with partial BWS from vector harness offloading 20-25lbs, BUE support on parallel bars vs FWW within parallel bars and SBA; hop-to pattern   - 10ft forward, 10ft retro with BUE support on // bars; verbal cues for sequencing and maintaining BUE extension   - Trialed FWW with ambulation 2x 10ft; verbal cues for AD management   - 0 LOB    Continued patient education onw/c  procurement process with anticipated necessity of smaller wheelchair for home mobility. Discussed limitations to functional ambulation due to patient's comorbidities and health status.     Assessment    Patient tolerated treatment well. Initiated ambulation with FWW; patient demonstrating poor quality of movement with increased pain to B wrist and shoulders limiting patient ability to continue practice. Continued discussion on home set up and ability to access home environment with existing equipment. Patient marginally ambulatory with body weight support in parallel bars. Poor prognosis for return to ambulation due to R TKA, RA and high risk for joint breakdown, generalized weakness, and body habitus. Therefore, given performance and comorbidities we recommend patient be primarily moving at manual w/c level for all MRADL's at discharge, with ambulation with FWW only used for short distance gait and transfers in order to protect joints. Patient reports current wheelchair is bariatric size and was obtained about 3 years ago in past inpatient rehab stay, and that patient has since lost 100+ lbs, and does not fit in home. Patient at high risk of injury and falls if he attempts to walk in current condition. Patient will benefit from 18W x 18D x 20H w/c with seat belt, full length swing away arms, bilateral elevating leg rests, and standard cushion.     Strengths: Able to follow instructions, Effective communication skills, Good insight into deficits/needs, Independent prior level of function  Barriers: Fatigue, Generalized weakness, Home accessibility, Impaired activity tolerance, Impaired balance, Pain, Pain poorly managed, Pressure ulcer    Plan    Trial 18in W WC with swing away armrests- refer to OT note from 3/31 for details regarding home set up     Shuttle, piriformis stretch L side, Seated dynamic balance, BLE strengthening, prone lying 20-30minutes per day (patient able to perform independently), HEP for LE  strengthening, bed mobility, transfers squat pivot, standing tolerance/balance with LRAD, w/c mobility/ramp negotiation, contracture prevention, residual limb desensitization, Independent residual limb wrapping, Prosthetic and amputee education     Passport items to be completed:  Get in/out of bed safely, in/out of a vehicle, safely use mobility device, walk or wheel around home/community, navigate up and down stairs, show how to get up/down from the ground, ensure     Physical Therapy Problems (Active)       Problem: PT-Long Term Goals       Dates: Start: 03/22/23         Goal: LTG-By discharge, patient will propel wheelchair household distances with Mariano.        Dates: Start: 03/22/23            Goal: LTG-By discharge, patient will perform home exercise program independently.        Dates: Start: 03/22/23            Goal: LTG-By discharge, patient will transfer in/out of a car with supervision.        Dates: Start: 03/22/23            Goal: LTG-By discharge, patient will complete bed mobility with Mariano.        Dates: Start: 03/22/23            Goal: LTG-By discharge, patient will complete w/c to bed transfer using squat pivot vs slide board Mariano.        Dates: Start: 03/22/23

## 2023-04-03 NOTE — THERAPY
"Physical Therapy   Daily Treatment     Patient Name: Richard Hubbard II  Age:  47 y.o., Sex:  male  Medical Record #: [id number]  Today's Date: 4/3/2023     Precautions  Precautions: Fall Risk, Non Weight Bearing Left Lower Extremity, Weight Bearing As Tolerated Right Lower Extremity  Comments: L BKA, RA - L shoulder and R wrist exacerbation. R wrist brace, L knee immobilizer donned in bed for ambulation and standing transfers; R foot wound with offloading shoe OOB    Subjective    Patient agreeable to session. Has no complaints.       Objective       04/03/23 1301   PT Charge Group   PT Therapeutic Activities (Units) 2   PT Total Time Spent   PT Individual Total Time Spent (Mins) 30   Wheelchair Functional Level of Assist   Wheelchair Assist Supervised   Distance Wheelchair (Feet or Distance) 125   Wheelchair Description Extra time;Supervision for safety   Interdisciplinary Plan of Care Collaboration   IDT Collaboration with  Physical Therapist Assistant (PTA)   Patient Position at End of Therapy Seated;Call Light within Reach   Collaboration Comments CLOF and tx     STS from mat table to FWW:  - 22\" height 2 x 5 CGA  - 21\" height 1 x 5 CGA  - 20\" height x 2 w/ min assist  - cues for RLE foot placement underneath COG, weight shifting, and eccentric control w/ descent primarily focused on knee flexion moment.     Lateral scoot completed w/c > mat table CGA to the left and mat > w/c to the right needing min assist.     Assessment    Patient demos impaired weight shifting and weight acceptance on RLE during STS transitions, he tends to compensate through the hip w/ flexion and extends knee into table to control his knee. Improves slightly with cues.     Strengths: Able to follow instructions, Effective communication skills, Good insight into deficits/needs, Independent prior level of function  Barriers: Fatigue, Generalized weakness, Home accessibility, Impaired activity tolerance, Impaired balance, Pain, Pain " poorly managed, Pressure ulcer    Plan    Trial 18in W WC with swing away armrests- refer to OT note from 3/31 for details regarding home set up     Shuttle, piriformis stretch L side, Seated dynamic balance, BLE strengthening, prone lying 20-30minutes per day (patient able to perform independently), HEP for LE strengthening, bed mobility, transfers squat pivot, standing tolerance/balance with LRAD, w/c mobility/ramp negotiation, contracture prevention, residual limb desensitization, Independent residual limb wrapping, Prosthetic and amputee education     Passport items to be completed:  Get in/out of bed safely, in/out of a vehicle, safely use mobility device, walk or wheel around home/community, navigate up and down stairs, show how to get up/down from the ground, ensure     Physical Therapy Problems (Active)       Problem: PT-Long Term Goals       Dates: Start: 03/22/23         Goal: LTG-By discharge, patient will propel wheelchair household distances with Mariano.        Dates: Start: 03/22/23            Goal: LTG-By discharge, patient will perform home exercise program independently.        Dates: Start: 03/22/23            Goal: LTG-By discharge, patient will transfer in/out of a car with supervision.        Dates: Start: 03/22/23            Goal: LTG-By discharge, patient will complete bed mobility with Mariano.        Dates: Start: 03/22/23            Goal: LTG-By discharge, patient will complete w/c to bed transfer using squat pivot vs slide board Mariano.        Dates: Start: 03/22/23

## 2023-04-03 NOTE — DISCHARGE PLANNING
Late Entry from 3/31.  Tc to [facility]/Dr Chambers; appointment scheduled for staple removal as follows:                   4/19/2023  1:30 PM [facility]

## 2023-04-03 NOTE — THERAPY
Occupational Therapy  Daily Treatment     Patient Name: Richard Hubbard II  Age:  47 y.o., Sex:  male  Medical Record #: [id number]  Today's Date: 4/3/2023     Precautions  Precautions: Fall Risk, Non Weight Bearing Left Lower Extremity, Weight Bearing As Tolerated Right Lower Extremity  Comments: L BKA, RA - L shoulder and R wrist exacerbation. R wrist brace, L knee immobilizer donned in bed for ambulation and standing transfers; R foot wound with offloading shoe OOB         Subjective    Pt awake in bed upon arrival, agreeable to OT session.      Objective     04/03/23 1001   OT Charge Group   OT Self Care / ADL (Units) 3   OT Therapy Activity (Units) 1   OT Total Time Spent   OT Individual Total Time Spent (Mins) 60   Functional Level of Assist   Bathing Supervision  (remained seated, washes bottom in side lean)   Bathing Description Adaptive equipment;Grab bar;Hand held shower;Tub bench;Increased time;Initial preparation for task;Supervision for safety;Verbal cueing;Set up for wound protection   Upper Body Dressing Independent   Lower Body Dressing Minimal Assist  (assist to pull all the way over hips in standing with GB)   Lower Body Dressing Description Grab bar;Increased time;Initial preparation for task;Set-up of equipment;Supervision for safety;Verbal cueing   Bed, Chair, Wheelchair Transfer Contact Guard Assist  (reach pivot EOB to w/c)   Bed Chair Wheelchair Transfer Description Adaptive equipment;Increased time;Initial preparation for task;Set-up of equipment;Supervision for safety;Verbal cueing   Tub / Shower Transfers Minimal Assist  (reach pivot w/c to shower bench, SB txfer uphill back to w/c after shower)   Tub Shower Transfer Description Adaptive equipment;Grab bar;Shower bench;Initial preparation for task;Increased time;Set-up of equipment;Supervision for safety;Verbal cueing   IADL Treatments   Home Management Pt loaded laundry machine with clothes and soap seated in w/c, set dials with verbal  cues   Interdisciplinary Plan of Care Collaboration   IDT Collaboration with  Nursing   Patient Position at End of Therapy Seated;Call Light within Reach;Tray Table within Reach;Phone within Reach   Collaboration Comments RN in for R foot wound care after shower       Assessment    Pt tolerated OT session well focused on ADLs. Needs use of SB for uphill transfer from shower bench back to w/c but able to complete with Adore. Anticipate pt would have increased independence with LBD from the bed level, completed pants over hips pursuit standing with GB for this session. Pt is overall anxious about d/c home next week due to concern for his w/c fitting through the home and limited standing/mobility at the W at this time due to RLE weakness and foot wound.     Strengths: Able to follow instructions, Alert and oriented, Effective communication skills, Good insight into deficits/needs, Independent prior level of function, Motivated for self care and independence, Pleasant and cooperative, Supportive family, Willingly participates in therapeutic activities  Barriers: Decreased endurance, Fatigue, Generalized weakness, Home accessibility, Impaired activity tolerance, Impaired balance, Limited mobility, Pain, Pain poorly managed    Plan    BUE strengthening within reduced pain ranges, sitting balance    Passport items to be completed:  Perform bathroom transfers, complete dressing, complete feeding, get ready for the day, prepare a simple meal, participate in household tasks, adapt home for safety needs, demonstrate home exercise program, complete caregiver training     Occupational Therapy Goals (Active)       Problem: Bathing       Dates: Start: 03/22/23         Goal: STG-Within one week, patient will bathe at CGA level using DME/AE PRN.       Dates: Start: 03/22/23               Problem: OT Long Term Goals       Dates: Start: 03/22/23         Goal: LTG-By discharge, patient will complete basic self care tasks at Min A-Mod  I level using DME/AE PRN.       Dates: Start: 03/22/23            Goal: LTG-By discharge, patient will perform bathroom transfers at SBA-Mod I level using DME/AE PRN.       Dates: Start: 03/22/23            Goal: LTG-By discharge, patient will complete basic home management at Min A-Mod I level using DME/AE PRN.       Dates: Start: 03/22/23

## 2023-04-03 NOTE — CARE PLAN
The patient is Stable - Low risk of patient condition declining or worsening    Shift Goals  Clinical Goals: safety  Patient Goals: pain management      Problem: Pain - Standard  Goal: Alleviation of pain or a reduction in pain to the patient’s comfort goal  Outcome: Progressing Patient able to verbalize pain level and verbalize an acceptable level of pain.      Problem: Skin Integrity  Goal: Skin integrity is maintained or improved  Outcome: Progressing BKA dressing changed and right foot plantar wound dressing changed and area cleaned. Pictures taken and uploaded.

## 2023-04-04 NOTE — DISCHARGE PLANNING
CM - Per therapy -  updated wc measurements for pt are as follows:  18w x 18d x 20h, swing away full length arm rest, seat belt, standard cushion, and bilateral elevating leg rests. Tub transfer bench and drop arm commode also ordered thru [facility] - preferred provided w/insurance.

## 2023-04-04 NOTE — THERAPY
Occupational Therapy  Daily Treatment     Patient Name: Richard Hubbard II  Age:  47 y.o., Sex:  male  Medical Record #: [id number]  Today's Date: 4/4/2023     Precautions  Precautions: (P) Fall Risk, Non Weight Bearing Left Lower Extremity, Weight Bearing As Tolerated Right Lower Extremity  Comments: (P) L BKA, RA - L shoulder and R wrist exacerbation. R and L wrist brace, L knee immobilizer donned in bed for ambulation and standing transfers; R foot wound with offloading shoe OOB         Subjective    Pt seated in wc upon arrival, agreeable to overall therex. Pt req to work on LB exercises.      Objective       04/04/23 1101   OT Charge Group   OT Therapeutic Exercise (Units) 2   OT Total Time Spent   OT Individual Total Time Spent (Mins) 30   Precautions   Precautions Fall Risk;Non Weight Bearing Left Lower Extremity;Weight Bearing As Tolerated Right Lower Extremity   Comments L BKA, RA - L shoulder and R wrist exacerbation. R and L wrist brace, L knee immobilizer donned in bed for ambulation and standing transfers; R foot wound with offloading shoe OOB   Functional Level of Assist   Bed, Chair, Wheelchair Transfer Standby Assist   Sitting Upper Body Exercises   Shoulder Press 1 set of 10;Right ;Left;Weight (See Comments for lbs)  (3# dumbell)   Bicep Curls 1 set of 10;Right ;Left;Weight (See Comments for lbs)  (3# dumbell)   Sitting Lower Body Exercises   Long Arc Quad 3 sets of 15;Right;Weight (See Comments for lbs)  (2.5# ankle weight)   Marching 3 sets of 15  (R side only 2.5# ankle weight)   Bed Mobility    Sit to Supine Supervised   Interdisciplinary Plan of Care Collaboration   Patient Position at End of Therapy In Bed;Call Light within Reach;Tray Table within Reach;Phone within Reach         Assessment    Pt completed therex to the best of ability. Pt stating that shoulder press with LUE feels like moving arm through mud/concrete.     Strengths: Able to follow instructions, Alert and oriented,  Effective communication skills, Good insight into deficits/needs, Independent prior level of function, Motivated for self care and independence, Pleasant and cooperative, Supportive family, Willingly participates in therapeutic activities  Barriers: Decreased endurance, Fatigue, Generalized weakness, Home accessibility, Impaired activity tolerance, Impaired balance, Limited mobility, Pain, Pain poorly managed    Plan    BUE strengthening within reduced pain ranges, sitting balance      Occupational Therapy Goals (Active)       Problem: Bathing       Dates: Start: 03/22/23         Goal: STG-Within one week, patient will bathe at CGA level using DME/AE PRN.       Dates: Start: 03/22/23               Problem: OT Long Term Goals       Dates: Start: 03/22/23         Goal: LTG-By discharge, patient will complete basic self care tasks at Min A-Mod I level using DME/AE PRN.       Dates: Start: 03/22/23            Goal: LTG-By discharge, patient will perform bathroom transfers at SBA-Mod I level using DME/AE PRN.       Dates: Start: 03/22/23            Goal: LTG-By discharge, patient will complete basic home management at Min A-Mod I level using DME/AE PRN.       Dates: Start: 03/22/23

## 2023-04-04 NOTE — CARE PLAN
Problem: Knowledge Deficit - Standard  Goal: Patient and family/care givers will demonstrate understanding of plan of care, disease process/condition, diagnostic tests and medications  Outcome: Progressing     Problem: Infection  Goal: Patient will remain free from infection  Note: Patient remains free from s/s infection; afebrile.  Will continue to monitor.    The patient is Stable - Low risk of patient condition declining or worsening    Shift Goals  Clinical Goals: Safety  Patient Goals: pain control, rest

## 2023-04-04 NOTE — PROGRESS NOTES
"  Physical Medicine & Rehabilitation Progress Note    Encounter Date: 4/4/2023    Chief Complaint: Decreased mobility, leg pain    Interval Events (Subjective):  Patient sitting up in room. He reports abdominal discomfort. He reports he thinks it is just gas. Will get KUB and start simethicone. He is on high dose opiates.     _____________________________________  Interdisciplinary Team Conference   Most recent IDT on 3/30/2023    Discharge Date/Disposition:  4/11/23  _____________________________________      Objective:  VITAL SIGNS: /67   Pulse 62   Temp 36.1 °C (97 °F)   Resp 18   Ht 1.8 m (5' 10.87\")   Wt 95.8 kg (211 lb 3.2 oz)   SpO2 96%   BMI 29.57 kg/m²   Gen: NAD  Psych: Mood and affect appropriate  CV: RRR, 0 edema  Resp: CTAB, no upper airway sounds  Abd: NTND  Neuro: AOx4, following commands  Unchanged from 4/3/23    Laboratory Values:  Recent Results (from the past 72 hour(s))   CBC WITH DIFFERENTIAL    Collection Time: 04/03/23  5:25 AM   Result Value Ref Range    WBC 5.7 4.8 - 10.8 K/uL    RBC 3.61 (L) 4.70 - 6.10 M/uL    Hemoglobin 10.3 (L) 14.0 - 18.0 g/dL    Hematocrit 33.7 (L) 42.0 - 52.0 %    MCV 93.4 81.4 - 97.8 fL    MCH 28.5 27.0 - 33.0 pg    MCHC 30.6 (L) 33.7 - 35.3 g/dL    RDW 50.4 (H) 35.9 - 50.0 fL    Platelet Count 292 164 - 446 K/uL    MPV 9.5 9.0 - 12.9 fL    Neutrophils-Polys 44.70 44.00 - 72.00 %    Lymphocytes 42.90 (H) 22.00 - 41.00 %    Monocytes 9.20 0.00 - 13.40 %    Eosinophils 1.70 0.00 - 6.90 %    Basophils 1.20 0.00 - 1.80 %    Immature Granulocytes 0.30 0.00 - 0.90 %    Nucleated RBC 0.00 /100 WBC    Neutrophils (Absolute) 2.55 1.82 - 7.42 K/uL    Lymphs (Absolute) 2.46 1.00 - 4.80 K/uL    Monos (Absolute) 0.53 0.00 - 0.85 K/uL    Eos (Absolute) 0.10 0.00 - 0.51 K/uL    Baso (Absolute) 0.07 0.00 - 0.12 K/uL    Immature Granulocytes (abs) 0.02 0.00 - 0.11 K/uL    NRBC (Absolute) 0.00 K/uL   Basic Metabolic Panel    Collection Time: 04/03/23  5:25 AM   Result " Value Ref Range    Sodium 138 135 - 145 mmol/L    Potassium 4.5 3.6 - 5.5 mmol/L    Chloride 104 96 - 112 mmol/L    Co2 26 20 - 33 mmol/L    Glucose 82 65 - 99 mg/dL    Bun 9 8 - 22 mg/dL    Creatinine 0.55 0.50 - 1.40 mg/dL    Calcium 8.6 8.5 - 10.5 mg/dL    Anion Gap 8.0 7.0 - 16.0   MAGNESIUM    Collection Time: 04/03/23  5:25 AM   Result Value Ref Range    Magnesium 1.7 1.5 - 2.5 mg/dL   ESTIMATED GFR    Collection Time: 04/03/23  5:25 AM   Result Value Ref Range    GFR (CKD-EPI) 123 >60 mL/min/1.73 m 2       Medications:  Scheduled Medications   Medication Dose Frequency    simethicone  125 mg TID    pregabalin  200 mg TID    docusate sodium  10 mg TID    hydrocortisone   TID    nicotine  7 mg Daily-0600    multivitamin  1 Tablet DAILY    morphine  30 mg TID    senna-docusate  2 Tablet BID    acetaminophen  650 mg Q6HRS    calcium citrate  950 mg DAILY    DULoxetine  60 mg DAILY    morphine ER  30 mg Q12HRS    omeprazole  40 mg DAILY    thiamine  100 mg DAILY    vitamin D2 (Ergocalciferol)  50,000 Units Q7 DAYS     PRN medications: sodium chloride, capsaicin, Respiratory Therapy Consult, hydrALAZINE, acetaminophen, senna-docusate **AND** polyethylene glycol/lytes **AND** magnesium hydroxide **AND** bisacodyl, mag hydrox-al hydrox-simeth, ondansetron **OR** ondansetron, traZODone, sodium chloride, carboxymethylcellulose, methocarbamol, mirtazapine, morphine, zolpidem    Diet:  Current Diet Order   Procedures    Diet Order Diet: Regular       Medical Decision Making and Plan:   L BKA - Patient with osteomyelitis while on DMARD for RA with multiple foot wounds. Patient is s/p left BKA on 3/9/23 with Dr. Chambers. NWB LLE. Needs IPOP vs extension brace.   -PT and OT for mobility and ADLs. Per guidelines, 15 hours per week between PT, OT and/or SLP.  -Follow-up Dr. Chambers. Continue sutures until follow-up with Dr. Chambers     RA - Previously on medication. Currently on hold.      Tobacco Abuse - Patient on  nicotine patch. Counseled      Anemia - Check AM CBC - 9.0 on admission, will monitor. Recheck 2/27 with Fe panel. Fe panel - low Iron. Start supplement. Repeat 9.8, will monitor     Hyponatremia - Check AM CMP - 132, will continue to monitor. Recheck 2/27 - 137, improved     Hypocalcemia - Check AM CMP - 8.3 on admission     Morbid obesity due to excess calories - BMI of 35.3 on admission, meets medical criteria. Dietitian to consult     Neuropathy - Patient on Lyrica TID. Continue Lyrica at current dose. Worsening night time shooting pain. Increase to 150/150/200 mg and check AM CBC/CMP/Mag. Labs stable, improved night time pain control. Continue Lyrica 150-150-200. Recheck AM labs on 4/3/23. Stable Cr increase to 200 mg TID   Gabapentin and Pregabalin are high risk medications in the class of MICHAEL alpha 2 receptor agonists and cause a decrease in pain, particularly neuropathic pain. Patient must be monitored as these medications have been found to be heavily affected by the kidney clearance at any dose especially in combination with opiates and/or benzodiazepines. Side effects at higher doses or with a decrease in kidney function can include encephalopathy, sedation, respiratory depression, and increased suicidality.  Will check vital signs, monitor GFR biweekly, and monitor cognitive status as well as mood.    Adjustment disorder with depression - Patient with adjustment disorder to new disability. He is on Cymbalta 60 mg. Continue Cymbalta and consider increase    Pain - Patient on Morphine ER, Morphine IR PRN, Tylenol, and Lyrica  -Schedule Morphine , 1130, and 1500. Continue scheduled Morphine IR and ER as well as lyrica. Will recheck Cr 2/27 - stable Cr at 0.49     Skin - Patient at risk for skin breakdown due to debility in areas including sacrum, achilles, elbows and head in addition to other sites. Nursing to assess skin daily.   -Rash on 3/28, start hydrocortisone.      GI Ppx - Patient on Prilosec  for GERD prophylaxis. Patient on Senna-docusate for constipation prophylaxis.   -Abdominal discomfort 4/4/23, check KUB and start Simethicone      DVT Ppx - Patient refusing AC on transfer. Patient reports he is aware of risks.  ____________________________________    T. Loi Pisano MD./PhD.  [facility] - Physical Medicine & Rehabilitation   [facility] - Brain Injury Medicine   ____________________________________

## 2023-04-04 NOTE — THERAPY
Physical Therapy   Daily Treatment     Patient Name: Richard Hubbard II  Age:  47 y.o., Sex:  male  Medical Record #: [id number]  Today's Date: 4/4/2023     Precautions  Precautions: Fall Risk, Non Weight Bearing Left Lower Extremity, Weight Bearing As Tolerated Right Lower Extremity  Comments: L BKA, RA - L shoulder and R wrist exacerbation. R and L wrist brace, L knee immobilizer donned in bed for ambulation and standing transfers; R foot wound with offloading shoe OOB    Subjective    Patient seen twice today, 13:00 (30 minutes) and 15:00 (60 minutes), patient agreeable to both session.s      Objective       04/04/23 1301   PT Charge Group   PT Therapeutic Activities (Units) 2   Precautions   Precautions Fall Risk;Non Weight Bearing Left Lower Extremity;Weight Bearing As Tolerated Right Lower Extremity   Comments L BKA, RA - L shoulder and R wrist exacerbation. R and L wrist brace, L knee immobilizer donned in bed for ambulation and standing transfers; R foot wound with offloading shoe OOB   Interdisciplinary Plan of Care Collaboration   IDT Collaboration with  Nursing;;Occupational Therapist   Patient Position at End of Therapy Seated;Chair Alarm On;Tray Table within Reach;Call Light within Reach;Phone within Reach   Collaboration Comments CLOF, AD     1:00PM: Discussed w/c options with patient, educating patient on options for motor vs manual w/c, and how it relates to ambulation goals and attaining prosthesis. Rewrapped patient residual limb.      04/04/23 1501   Precautions   Precautions Fall Risk;Non Weight Bearing Left Lower Extremity;Weight Bearing As Tolerated Right Lower Extremity   Comments L BKA, RA - L shoulder and R wrist exacerbation. R and L wrist brace, L knee immobilizer donned in bed for ambulation and standing transfers; R foot wound with offloading shoe OOB   Wheelchair Functional Level of Assist   Wheelchair Assist Supervised   Distance Wheelchair (Feet or Distance) 125    Wheelchair Description Extra time;Supervision for safety   Transfer Functional Level of Assist   Bed, Chair, Wheelchair Transfer Standby Assist   Bed Chair Wheelchair Transfer Description Increased time;Supervision for safety;Squat pivot transfer to wheelchair  (squat pivot EOB <> w/c)   Supine Lower Body Exercise   Bridges 2 sets of 15  (LLE over large bolster; blue band for tactile faciltation of ABD)   Other Exercises Sustained prone position - 20 minutes. B knee flexion 2 x 15; 5lb weight each side.   Bed Mobility    Scooting Standby Assist   Interdisciplinary Plan of Care Collaboration   IDT Collaboration with  Nursing;Occupational Therapist;   Patient Position at End of Therapy In Bed;Call Light within Reach;Tray Table within Reach;Phone within Reach   Collaboration Comments CLOF, AD     3:00PM:   - Performed supine and prone there-ex as above.   - L piriformis stretch seated, on edge of therapy mat - 3x 1 minute  - Shuttle RLE leg press, with tactile and verbal cues to control TKE:    - Squat pivot transfer to and from shuttle with SBA    - 1 X 10, 4 cords   - 2 X 10, 4.5 cords     Assessment    Patient tolerated both treatments well. Patient reporting that he would like to move forward with manual w/c ordering; specs given to CM. Demonstrating increased endurance, with improved BLE strength and control during therapeutic exercises. Performed all transfers during session with improved transfer mechanics, anterior weight shift and weight acceptance on RLE. Plan to attempt stand hop transfer with FWW to simulate bathroom home set up.     Strengths: Able to follow instructions, Effective communication skills, Good insight into deficits/needs, Independent prior level of function  Barriers: Fatigue, Generalized weakness, Home accessibility, Impaired activity tolerance, Impaired balance, Pain, Pain poorly managed, Pressure ulcer    Plan    Trial 18in W WC with swing away armrests- refer to OT note  from 3/31 for details regarding home set up     Shuttle, piriformis stretch L side, Seated dynamic balance, BLE strengthening, prone lying 20-30minutes per day (patient able to perform independently), HEP for LE strengthening, bed mobility, transfers squat pivot, standing tolerance/balance with LRAD, w/c mobility/ramp negotiation, contracture prevention, residual limb desensitization, Independent residual limb wrapping, Prosthetic and amputee education     Passport items to be completed:  Get in/out of bed safely, in/out of a vehicle, safely use mobility device, walk or wheel around home/community, navigate up and down stairs, show how to get up/down from the ground, ensure     Physical Therapy Problems (Active)       Problem: PT-Long Term Goals       Dates: Start: 03/22/23         Goal: LTG-By discharge, patient will propel wheelchair household distances with Mariano.        Dates: Start: 03/22/23            Goal: LTG-By discharge, patient will perform home exercise program independently.        Dates: Start: 03/22/23            Goal: LTG-By discharge, patient will transfer in/out of a car with supervision.        Dates: Start: 03/22/23            Goal: LTG-By discharge, patient will complete bed mobility with Mariano.        Dates: Start: 03/22/23            Goal: LTG-By discharge, patient will complete w/c to bed transfer using squat pivot vs slide board Mariano.        Dates: Start: 03/22/23

## 2023-04-04 NOTE — CARE PLAN
The patient is Stable - Low risk of patient condition declining or worsening    Shift Goals  Clinical Goals: safety  Patient Goals: pain control, to have a bowel movement    Progress made toward(s) clinical / shift goals:    Problem: Bladder / Voiding  Goal: Patient will establish and maintain regular urinary output  Outcome: Progressing. Patient urinating well over shift. Continent of bladder other than one accident over night. Patient uses urinal while in bed, clear yellow urine with good output.      Problem: Bowel Elimination  Goal: Patient will participate in bowel management program  Outcome: Progressing. Patient requested rectal suppository at bedtime. Had large continent brown bowel movement on toilet with minimal assist.

## 2023-04-04 NOTE — THERAPY
"Occupational Therapy  Daily Treatment     Patient Name: Richard Hubbard II  Age:  47 y.o., Sex:  male  Medical Record #: [id number]  Today's Date: 4/4/2023     Precautions  Precautions: Fall Risk, Non Weight Bearing Left Lower Extremity, Weight Bearing As Tolerated Right Lower Extremity  Comments: L BKA, RA - L shoulder and R wrist exacerbation. R and L wrist brace, L knee immobilizer donned in bed for ambulation and standing transfers; R foot wound with offloading shoe OOB         Subjective    \"I'm really concerned about my transfers at home.\"     Objective       04/04/23 0831   OT Charge Group   OT Therapy Activity (Units) 4   OT Total Time Spent   OT Individual Total Time Spent (Mins) 60   Cognition    Level of Consciousness Alert   Functional Level of Assist   Toilet Transfers Contact Guard Assist  (to SBA)   Toilet Transfer Description Adaptive equipment;Increased time;Supervision for safety;Verbal cueing;Set-up of equipment  (FWW for stand pivot w/c<>raised commode over toilet. Increased time for pivot, completed 2x.)   Tub / Shower Transfers Minimal Assist   Tub Shower Transfer Description Adaptive equipment;Shower bench;Increased time;Set-up of equipment;Supervision for safety;Verbal cueing  (Min A lateral scoot w/c<>tub transfer bench during Dry transfer. W/c did shift, and required bracing.)   Interdisciplinary Plan of Care Collaboration   IDT Collaboration with  Physical Therapist   Patient Position at End of Therapy Seated;Self Releasing Lap Belt Applied;Call Light within Reach   Collaboration Comments JAYME MAN     Pt completed all w/c management with don/doff of elevating leg rest, pillows, don/doff of bilateral wrist braces, and locking of w/c breaks. He completed functional w/c mobility at Mod I level with increased time, but was assisted back to room at end of session due to increase fatigue.    Reviewed home setup with pt currently using an 18\" w/c width with 24\" footprint. Per chart review of " previous notes and home setup, pt will be able to access 90% of his home in a w/c of this size. Recommended FWW for stand pivot toilet transfers, and simulated as such above. Pt also recommended to have drop arm cushioned commode due to poor skin integrity and amputation.    Assessment    Pt with good tolerance to all functional transfers this session, though does remain concerned regarding home access going forward.   Strengths: Able to follow instructions, Alert and oriented, Effective communication skills, Good insight into deficits/needs, Independent prior level of function, Motivated for self care and independence, Pleasant and cooperative, Supportive family, Willingly participates in therapeutic activities  Barriers: Decreased endurance, Fatigue, Generalized weakness, Home accessibility, Impaired activity tolerance, Impaired balance, Limited mobility, Pain, Pain poorly managed    Plan    Kitchen mobility from w/c level with simple meal prep, BUE strengthening, functional transfers using FWW for BR.    Passport items to be completed:  Perform bathroom transfers, complete dressing, complete feeding, get ready for the day, prepare a simple meal, participate in household tasks, adapt home for safety needs, demonstrate home exercise program, complete caregiver training     Occupational Therapy Goals (Active)       Problem: Bathing       Dates: Start: 03/22/23         Goal: STG-Within one week, patient will bathe at CGA level using DME/AE PRN.       Dates: Start: 03/22/23               Problem: OT Long Term Goals       Dates: Start: 03/22/23         Goal: LTG-By discharge, patient will complete basic self care tasks at Min A-Mod I level using DME/AE PRN.       Dates: Start: 03/22/23            Goal: LTG-By discharge, patient will perform bathroom transfers at SBA-Mod I level using DME/AE PRN.       Dates: Start: 03/22/23            Goal: LTG-By discharge, patient will complete basic home management at Min A-Mod I  level using DME/AE PRN.       Dates: Start: 03/22/23

## 2023-04-05 NOTE — PROGRESS NOTES
Patient care assumed. Report received from [facility] BARBARA Peters. Patient is alert and calm, resting in bed. Call light and bedside table within reach. Will continue to monitor.

## 2023-04-05 NOTE — CARE PLAN
The patient is Stable - Low risk of patient condition declining or worsening    Shift Goals  Clinical Goals: Safety  Patient Goals: pain control    Progress made toward(s) clinical / shift goals:    Problem: Bladder / Voiding  Goal: Patient will establish and maintain regular urinary output  Outcome: Progressing. Patient continent of bladder over shift. Uses urinal at bedside when in bed. Clear yellow urine with good output over shift. Calls appropriately to have staff empty urinal.      Problem: Fall Risk - Rehab  Goal: Patient will remain free from falls  Outcome: Progressing. Patient bed in lowest locked position with call light and belongings within reach. Patient refused bed alarm during day shift as well as over this shift. Patient educated on importance of bed alarm to prevent falls. Patient still refusing bed alarms.

## 2023-04-05 NOTE — PROGRESS NOTES
"  Physical Medicine & Rehabilitation Progress Note    Encounter Date: 4/5/2023    Chief Complaint: Decreased mobility, leg pain    Interval Events (Subjective):  Patient sitting up in room. He reports his stomach is feeling much better after the simethicone. He reports his pain is significant but he understands it will take time. Denies NVD.     _____________________________________  Interdisciplinary Team Conference   Most recent IDT on 3/30/2023    Discharge Date/Disposition:  4/11/23  _____________________________________      Objective:  VITAL SIGNS: BP 98/58   Pulse 72   Temp 36.2 °C (97.1 °F)   Resp 20   Ht 1.8 m (5' 10.87\")   Wt 95.8 kg (211 lb 3.2 oz)   SpO2 94%   BMI 29.57 kg/m²   Gen: NAD  Psych: Mood and affect appropriate  CV: RRR, 0 edema  Resp: CTAB, no upper airway sounds  Abd: NTND  Neuro: AOx4, following commands    Laboratory Values:  Recent Results (from the past 72 hour(s))   CBC WITH DIFFERENTIAL    Collection Time: 04/03/23  5:25 AM   Result Value Ref Range    WBC 5.7 4.8 - 10.8 K/uL    RBC 3.61 (L) 4.70 - 6.10 M/uL    Hemoglobin 10.3 (L) 14.0 - 18.0 g/dL    Hematocrit 33.7 (L) 42.0 - 52.0 %    MCV 93.4 81.4 - 97.8 fL    MCH 28.5 27.0 - 33.0 pg    MCHC 30.6 (L) 33.7 - 35.3 g/dL    RDW 50.4 (H) 35.9 - 50.0 fL    Platelet Count 292 164 - 446 K/uL    MPV 9.5 9.0 - 12.9 fL    Neutrophils-Polys 44.70 44.00 - 72.00 %    Lymphocytes 42.90 (H) 22.00 - 41.00 %    Monocytes 9.20 0.00 - 13.40 %    Eosinophils 1.70 0.00 - 6.90 %    Basophils 1.20 0.00 - 1.80 %    Immature Granulocytes 0.30 0.00 - 0.90 %    Nucleated RBC 0.00 /100 WBC    Neutrophils (Absolute) 2.55 1.82 - 7.42 K/uL    Lymphs (Absolute) 2.46 1.00 - 4.80 K/uL    Monos (Absolute) 0.53 0.00 - 0.85 K/uL    Eos (Absolute) 0.10 0.00 - 0.51 K/uL    Baso (Absolute) 0.07 0.00 - 0.12 K/uL    Immature Granulocytes (abs) 0.02 0.00 - 0.11 K/uL    NRBC (Absolute) 0.00 K/uL   Basic Metabolic Panel    Collection Time: 04/03/23  5:25 AM   Result Value " Ref Range    Sodium 138 135 - 145 mmol/L    Potassium 4.5 3.6 - 5.5 mmol/L    Chloride 104 96 - 112 mmol/L    Co2 26 20 - 33 mmol/L    Glucose 82 65 - 99 mg/dL    Bun 9 8 - 22 mg/dL    Creatinine 0.55 0.50 - 1.40 mg/dL    Calcium 8.6 8.5 - 10.5 mg/dL    Anion Gap 8.0 7.0 - 16.0   MAGNESIUM    Collection Time: 04/03/23  5:25 AM   Result Value Ref Range    Magnesium 1.7 1.5 - 2.5 mg/dL   ESTIMATED GFR    Collection Time: 04/03/23  5:25 AM   Result Value Ref Range    GFR (CKD-EPI) 123 >60 mL/min/1.73 m 2       Medications:  Scheduled Medications   Medication Dose Frequency    simethicone  125 mg TID    pregabalin  200 mg TID    hydrocortisone   TID    nicotine  7 mg Daily-0600    multivitamin  1 Tablet DAILY    morphine  30 mg TID    senna-docusate  2 Tablet BID    acetaminophen  650 mg Q6HRS    calcium citrate  950 mg DAILY    DULoxetine  60 mg DAILY    morphine ER  30 mg Q12HRS    omeprazole  40 mg DAILY    thiamine  100 mg DAILY    vitamin D2 (Ergocalciferol)  50,000 Units Q7 DAYS     PRN medications: sodium chloride, capsaicin, Respiratory Therapy Consult, hydrALAZINE, acetaminophen, senna-docusate **AND** polyethylene glycol/lytes **AND** magnesium hydroxide **AND** bisacodyl, mag hydrox-al hydrox-simeth, ondansetron **OR** ondansetron, traZODone, sodium chloride, carboxymethylcellulose, methocarbamol, mirtazapine, morphine, zolpidem    Diet:  Current Diet Order   Procedures    Diet Order Diet: Regular       Medical Decision Making and Plan:   L BKA - Patient with osteomyelitis while on DMARD for RA with multiple foot wounds. Patient is s/p left BKA on 3/9/23 with Dr. Chambers. NWB LLE. Needs IPOP vs extension brace.   -PT and OT for mobility and ADLs. Per guidelines, 15 hours per week between PT, OT and/or SLP.  -Follow-up Dr. Chambers. Continue sutures until follow-up with Dr. Chambers     RA - Previously on medication. Currently on hold.      Tobacco Abuse - Patient on nicotine patch. Counseled      Anemia -  Check AM CBC - 9.0 on admission, will monitor. Recheck 2/27 with Fe panel. Fe panel - low Iron. Start supplement. Repeat 9.8, will monitor     Hyponatremia - Check AM CMP - 132, will continue to monitor. Recheck 2/27 - 137, improved     Hypocalcemia - Check AM CMP - 8.3 on admission     Morbid obesity due to excess calories - BMI of 35.3 on admission, meets medical criteria. Dietitian to consult     Neuropathy - Patient on Lyrica TID. Continue Lyrica at current dose. Worsening night time shooting pain. Increase to 150/150/200 mg and check AM CBC/CMP/Mag. Labs stable, improved night time pain control. Continue Lyrica 150-150-200. Recheck AM labs on 4/3/23. Stable Cr increase to 200 mg TID  -Continue Lyrica 200 mg TID    Adjustment disorder with depression - Patient with adjustment disorder to new disability. He is on Cymbalta 60 mg.    Pain - Patient on Morphine ER, Morphine IR PRN, Tylenol, and Lyrica  -Schedule Morphine , 1130, and 1500. Continue scheduled Morphine IR and ER as well as lyrica. Will recheck Cr 2/27 - stable Cr at 0.49     Skin - Patient at risk for skin breakdown due to debility in areas including sacrum, achilles, elbows and head in addition to other sites. Nursing to assess skin daily.   -Rash on 3/28, start hydrocortisone.      GI Ppx - Patient on Prilosec for GERD prophylaxis. Patient on Senna-docusate for constipation prophylaxis.   -Abdominal discomfort 4/4/23, check KUB and start Simethicone      DVT Ppx - Patient refusing AC on transfer. Patient reports he is aware of risks.  ____________________________________    T. Loi Pisano MD./PhD.  [facility] - Physical Medicine & Rehabilitation   [facility]   ____________________________________

## 2023-04-05 NOTE — CARE PLAN
The patient is Stable - Low risk of patient condition declining or worsening    Shift Goals  Clinical Goals: safety  Patient Goals: pain management    Problem: Pain - Standard  Goal: Alleviation of pain or a reduction in pain to the patient’s comfort goal  Outcome: Progressing Patient able to verbalize pain level and verbalize an acceptable level of pain.      Problem: Mobility  Goal: Patient's capacity to carry out activities will improve  Outcome: Progressing patient has been up participating in therapies, educated to take rest periods in between to avoid fatigue.

## 2023-04-05 NOTE — THERAPY
Occupational Therapy  Daily Treatment     Patient Name: Richard Hubbard II  Age:  47 y.o., Sex:  male  Medical Record #: [id number]  Today's Date: 4/5/2023     Precautions  Precautions: Fall Risk, Non Weight Bearing Left Lower Extremity, Weight Bearing As Tolerated Right Lower Extremity  Comments: L BKA, RA - L shoulder and R wrist exacerbation. R and L wrist brace, L knee immobilizer donned in bed for ambulation and standing transfers; R foot wound with offloading shoe OOB         Subjective    Pt seen 2x this day and agreeable to both sessions as listed and detailed below.     Objective       04/05/23 0901 04/05/23 1301   OT Charge Group   OT Self Care / ADL (Units) 1  --    OT Therapy Activity (Units) 3  --    OT Therapeutic Exercise (Units)  --  2   OT Total Time Spent   OT Individual Total Time Spent (Mins) 60 30   Functional Level of Assist   Grooming Modified Independent;Seated  --    Grooming Description Seated in wheelchair at sink  (for oral care, hair care, and hand wash)  --    Lower Body Dressing Description   (Ind to don offloading shoe EOB)  --    Sitting Upper Body Exercises   Chest Fly  --  3 sets of 15;Bilateral;Medium Resistance Theraband   Chest Press  --  3 sets of 15;Bilateral;Medium Resistance Theraband   Front Arm Raise  --  3 sets of 15;Bilateral;Medium Resistance Theraband   Bilateral Row  --  3 sets of 15;Bilateral;Medium Resistance Theraband   Bicep Curls  --  3 sets of 15;Bilateral;Medium Resistance Theraband   Comments  --  Using a 4lb weighted dowel with OTR support therapy band for resistance.   IADL Treatments   IADL Treatments Kitchen mobility education  --    Kitchen Mobility Education Pt engaged in w/c level kitchen mobility education and training. He retrieved cones from upper/lower cabinets, drawers, fridge, freezer, . He required SBA for standing at oven to retrieve from microwave over oven. Recommended that they get a counter top microwave for ease of use.  Educated on increasing accessibility, safety, and energy conversation.  --    Bed Mobility    Supine to Sit  --  Modified Independent  (bed rail)   Interdisciplinary Plan of Care Collaboration   IDT Collaboration with  Physical Therapist  --    Patient Position at End of Therapy Seated;Call Light within Reach In Bed;Call Light within Reach;Tray Table within Reach   Collaboration Comments CLOF, POC  --          Assessment    Pt tolerated OT sessions well with focus on kitchen mobility and therex. 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  Strengths: Able to follow instructions, Alert and oriented, Effective communication skills, Good insight into deficits/needs, Independent prior level of function, Motivated for self care and independence, Pleasant and cooperative, Supportive family, Willingly participates in therapeutic activities  Barriers: Decreased endurance, Fatigue, Generalized weakness, Home accessibility, Impaired activity tolerance, Impaired balance, Limited mobility, Pain, Pain poorly managed    Plan    Kitchen mobility from w/c level with simple meal prep, BUE strengthening, functional transfers using FWW for BR.     Passport items to be completed:  Perform bathroom transfers, complete dressing, complete feeding, get ready for the day, prepare a simple meal, participate in household tasks, adapt home for safety needs, demonstrate home exercise program, complete caregiver training     Occupational Therapy Goals (Active)       Problem: Bathing       Dates: Start: 03/22/23         Goal: STG-Within one week, patient will bathe at CGA level using DME/AE PRN.       Dates: Start: 03/22/23               Problem: OT Long Term Goals       Dates: Start: 03/22/23         Goal: LTG-By discharge, patient will complete basic self care tasks at Min A-Mod I level using DME/AE PRN.       Dates: Start: 03/22/23            Goal: LTG-By discharge,  patient will perform bathroom transfers at SBA-Mod I level using DME/AE PRN.       Dates: Start: 03/22/23            Goal: LTG-By discharge, patient will complete basic home management at Min A-Mod I level using DME/AE PRN.       Dates: Start: 03/22/23

## 2023-04-05 NOTE — THERAPY
Physical Therapy   Daily Treatment     Patient Name: Richard Hubbard II  Age:  47 y.o., Sex:  male  Medical Record #: [id number]  Today's Date: 4/5/2023     Precautions  Precautions: (P) Fall Risk, Non Weight Bearing Left Lower Extremity, Weight Bearing As Tolerated Right Lower Extremity  Comments: (P) L BKA, RA - L shoulder and R wrist exacerbation. R and L wrist brace, L knee immobilizer donned in bed for ambulation and standing transfers; R foot wound with offloading shoe OOB    Subjective    Patient seated in bed, agreeable to PT session.     Objective       04/05/23 1501   Precautions   Precautions Fall Risk;Non Weight Bearing Left Lower Extremity;Weight Bearing As Tolerated Right Lower Extremity   Comments L BKA, RA - L shoulder and R wrist exacerbation. R and L wrist brace, L knee immobilizer donned in bed for ambulation and standing transfers; R foot wound with offloading shoe OOB   Transfer Functional Level of Assist   Bed, Chair, Wheelchair Transfer Standby Assist   Bed Chair Wheelchair Transfer Description Increased time;Supervision for safety;Initial preparation for task  (squat pivot EOB <> w/c <> therapy mat)   Bed Mobility    Sit to Stand Contact Guard Assist   Interdisciplinary Plan of Care Collaboration   IDT Collaboration with  Recreation Therapist   Patient Position at End of Therapy In Bed;Call Light within Reach;Tray Table within Reach;Phone within Reach   Collaboration Comments POC, CLOF     W/C modifications made to support proper pelvis and trunk alignment:    - Modified sitting surface to improve firmness for postural alignment   - Used gait belt and rolled towels to build up lumbar support for patient and to cue patient for neutral spine.    Continued education on independent wrapping and limb/wrapping checks with proper seated positioning to improve ability to reach limb, and use of mirror to view all aspects of residual limb.     Postural reeducation seated in w/c vs therapy mat, using  verbal, tactile, and visual (mirror) cues for pelvic tilts and spinal elongation; 10-15 minutes.     Assessment    Patient tolerated treatment well. Patient demonstrates improved posture during wheeling with w/c modifications and after postural training. Patient positively responded to w/c modifications, reporting improved support. Visual mirror cues were effective in promoting sustained postural corrections. Patient performed independent wrap checks, verbalizing what aspects of wrap require inspection. Able to direct PT in steps of ace wrapping demonstrating good recall of technique. Recommend further reinforcement for wrap technique and independent management.     Strengths: Able to follow instructions, Effective communication skills, Good insight into deficits/needs, Independent prior level of function  Barriers: Fatigue, Generalized weakness, Home accessibility, Impaired activity tolerance, Impaired balance, Pain, Pain poorly managed, Pressure ulcer    Plan    Abdominal/trunk postural strengthening, Shuttle, piriformis stretch L side, Seated dynamic balance, BLE strengthening, prone lying 20-30minutes per day (patient able to perform independently), HEP for LE strengthening, bed mobility, transfers squat pivot, standing tolerance/balance with LRAD, w/c mobility/ramp negotiation, contracture prevention, residual limb desensitization, Independent residual limb wrapping, Prosthetic and amputee education     Passport items to be completed:  Get in/out of bed safely, in/out of a vehicle, safely use mobility device, walk or wheel around home/community, navigate up and down stairs, show how to get up/down from the ground, ensure     Physical Therapy Problems (Active)       Problem: PT-Long Term Goals       Dates: Start: 03/22/23         Goal: LTG-By discharge, patient will propel wheelchair household distances with Mariano.        Dates: Start: 03/22/23            Goal: LTG-By discharge, patient will perform home exercise  program independently.        Dates: Start: 03/22/23            Goal: LTG-By discharge, patient will transfer in/out of a car with supervision.        Dates: Start: 03/22/23            Goal: LTG-By discharge, patient will complete bed mobility with Mariano.        Dates: Start: 03/22/23            Goal: LTG-By discharge, patient will complete w/c to bed transfer using squat pivot vs slide board Mariano.        Dates: Start: 03/22/23

## 2023-04-05 NOTE — THERAPY
Physical Therapy   Daily Treatment     Patient Name: Richard Hubbard II  Age:  47 y.o., Sex:  male  Medical Record #: [id number]  Today's Date: 4/5/2023     Precautions  Precautions: (P) Fall Risk, Non Weight Bearing Left Lower Extremity, Weight Bearing As Tolerated Right Lower Extremity  Comments: (P) L BKA, RA - L shoulder and R wrist exacerbation. R and L wrist brace, L knee immobilizer donned in bed for ambulation and standing transfers; R foot wound with offloading shoe OOB    Subjective    Patient seated in room, agreeable to PT session.     Objective       04/05/23 1101   PT Charge Group   PT Therapeutic Exercise (Units) 2   PT Total Time Spent   PT Individual Total Time Spent (Mins) 30   Precautions   Precautions Fall Risk;Non Weight Bearing Left Lower Extremity;Weight Bearing As Tolerated Right Lower Extremity   Comments L BKA, RA - L shoulder and R wrist exacerbation. R and L wrist brace, L knee immobilizer donned in bed for ambulation and standing transfers; R foot wound with offloading shoe OOB   Transfer Functional Level of Assist   Bed, Chair, Wheelchair Transfer Standby Assist   Bed Chair Wheelchair Transfer Description Increased time;Supervision for safety  (squat pivot EOB <> w/c <> therapy mat)   Interdisciplinary Plan of Care Collaboration   IDT Collaboration with  Nursing;Occupational Therapist;Physical Therapist   Patient Position at End of Therapy In Bed;Call Light within Reach;Phone within Reach;Tray Table within Reach  (Nursing present)   Collaboration Comments CLOF, POC, schedule     Seated postural and scapular stabilization exercises at edge of mat with Supervision, consistent verbal cues to maintain upright posture throughout interventions:               - BUE abduction with pink resistance band - 3 x 10               - BUE ER with pink resistance band - 3 x 10     Prone lying posterior chain strengthening on therapy mat, Supervision:   - Unilateral UE shoulder flexion and  "contralateral LE hip extension, lifting off table, \"Half superman\" - 1 x 15   - All extremities lift off table, \"superman\" - 1 x 15  - Required intermittent verbal and tactile cues for abdominal activation, B shoulder retraction, and B hip extension.     Assessment    Patient tolerated treatment well, demonstrating improved sustained postural strength during seated tasks. Benefits from tactile cues for scapular retraction during prone activities to improve motor control.     Strengths: Able to follow instructions, Effective communication skills, Good insight into deficits/needs, Independent prior level of function  Barriers: Fatigue, Generalized weakness, Home accessibility, Impaired activity tolerance, Impaired balance, Pain, Pain poorly managed, Pressure ulcer    Plan    Abdominal/trunk postural strengthening, Shuttle, piriformis stretch L side, Seated dynamic balance, BLE strengthening, prone lying 20-30minutes per day (patient able to perform independently), HEP for LE strengthening, bed mobility, transfers squat pivot, standing tolerance/balance with LRAD, w/c mobility/ramp negotiation, contracture prevention, residual limb desensitization, Independent residual limb wrapping, Prosthetic and amputee education     Passport items to be completed:  Get in/out of bed safely, in/out of a vehicle, safely use mobility device, walk or wheel around home/community, navigate up and down stairs, show how to get up/down from the ground, ensure     Physical Therapy Problems (Active)       Problem: PT-Long Term Goals       Dates: Start: 03/22/23         Goal: LTG-By discharge, patient will propel wheelchair household distances with Mariano.        Dates: Start: 03/22/23            Goal: LTG-By discharge, patient will perform home exercise program independently.        Dates: Start: 03/22/23            Goal: LTG-By discharge, patient will transfer in/out of a car with supervision.        Dates: Start: 03/22/23            Goal: " LTG-By discharge, patient will complete bed mobility with Mariano.        Dates: Start: 03/22/23            Goal: LTG-By discharge, patient will complete w/c to bed transfer using squat pivot vs slide board Mariano.        Dates: Start: 03/22/23

## 2023-04-05 NOTE — PROGRESS NOTES
"While administering patient's medications, I noticed his bed alarm was not plugged in. When I plugged in the alarm to the wall unit, patient said \"don't plug that in because it keeps going off.\" I informed him that all patients have bed alarms plugged in for their safety, and when it sounds off, we will come in to check on him and reset it. He became upset and raising his voice. I told him I will speak with the charge nurse and get back to him. After speaking with Lori, charge nurse, she said if he refuses to have his bed alarm on, make a note of it. I spoke with the patient to reiterate the importance of the bed alarm for his safety, and asked him if he is refusing to have the alarm on and he said \"yes.\" Bed alarm was unplugged. Charge nurse and oncoming RN and  CNA notified.   "

## 2023-04-06 NOTE — CARE PLAN
The patient is Stable - Low risk of patient condition declining or worsening    Shift Goals  Clinical Goals: safety  Patient Goals: pain management    Progress made toward(s) clinical / shift goals:    Problem: Bladder / Voiding  Goal: Patient will establish and maintain regular urinary output  Outcome: Progressing. Patient continent of urine over shift. Uses urinal at bedside. Calls appropriately to have staff empty urinal.      Problem: Bowel Elimination  Goal: Patient will participate in bowel management program  Outcome: Progressing. Patient continent of bowel. Requested rectal suppository this afternoon, Lbm 4/5/23.

## 2023-04-06 NOTE — THERAPY
Recreational Therapy  Daily Treatment     Patient Name: Richard Hubbard II  AGE:  47 y.o., SEX:  male  Medical Record #: [id number]  Today's Date: 4/6/2023       Subjective    Patient was ready and agreeable to recreation therapy session.      Objective       04/06/23 0931   Procedural Tracking   Procedural Tracking Community Re-Integration;Community Skills Development;Leisure Skills Awareness;Leisure Skills Development;Cognitive Skills Training;Social Skills Training;Gross Motor Functional Leisure Skills;Fine Motor Functional Leisure Skills   Treatment Time   Total Time Spent (mins) 30   Total Time Missed 0   Functional Ability Status - Cognitive   Attention Span Remains on Task   Comprehension Follows One Step Commands;Follows Two Step Commands   Judgment Able to Make Independent Decisions   Functional Ability Status - Emotional    Affect Appropriate   Mood Appropriate   Behavior Appropriate;Cooperative   Skilled Intervention    Skilled Intervention Gross Motor Leisure;Fine Motor Leisure;Cognitive Leisure   Interdisciplinary Plan of Care Collaboration   Patient Position at End of Therapy Seated  (with PT)   Strengths & Barriers   Strengths Able to follow instructions;Alert and oriented;Willingly participates in therapeutic activities;Pleasant and cooperative;Motivated for self care and independence;Making steady progress towards goals;Good insight into deficits/needs   Barriers Decreased endurance;Fatigue;Generalized weakness;Impaired balance;Impaired activity tolerance;Limited mobility         Assessment    Patient participated in new game, Holidu, during recreation therapy session. Patient required min cues for scanning, sequencing and recall. Patient required mod cues for processing speed and strategy.     Strengths: (P) Able to follow instructions, Alert and oriented, Willingly participates in therapeutic activities, Pleasant and cooperative, Motivated for self care and independence, Making steady progress  towards goals, Good insight into deficits/needs  Barriers: (P) Decreased endurance, Fatigue, Generalized weakness, Impaired balance, Impaired activity tolerance, Limited mobility    Plan    Patient will benefit from continued recreation therapy sessions.     Passport items to be completed:  Verbalize two positive leisure activities, discuss returning to work, hobbies, community groups or volunteer activities, explore community resources

## 2023-04-06 NOTE — CARE PLAN
Problem: Bathing  Goal: STG-Within one week, patient will bathe at CGA level using DME/AE PRN.  Outcome: Met

## 2023-04-06 NOTE — THERAPY
Occupational Therapy  Daily Treatment     Patient Name: Richard Hubbard II  Age:  47 y.o., Sex:  male  Medical Record #: [id number]  Today's Date: 4/6/2023     Precautions  Precautions: Fall Risk, Non Weight Bearing Left Lower Extremity, Weight Bearing As Tolerated Right Lower Extremity  Comments: L BKA, RA - L shoulder and R wrist exacerbation. R and L wrist brace, L knee immobilizer donned in bed for ambulation and standing transfers; R foot wound with offloading shoe OOB         Subjective    Pt agreeable to OT.     Objective       04/06/23 0831   OT Charge Group   OT Self Care / ADL (Units) 4   OT Total Time Spent   OT Individual Total Time Spent (Mins) 60   Pain   Intervention Declines   Cognition    Level of Consciousness Alert   Functional Level of Assist   Grooming Modified Independent;Seated   Grooming Description Seated in wheelchair at sink  (for oral care, hair care)   Bathing Supervision   Bathing Description Grab bar;Hand held shower;Tub bench;Increased time;Verbal cueing  (verbal/visual cues for waterproofing. Pt able to complete all tasks seated on bench with lateral lean for rear. Single cue to scoot back on bench, being very close to edge.)   Upper Body Dressing Independent   Lower Body Dressing Contact Guard Assist   Lower Body Dressing Description Grab bar;Increased time;Supervision for safety  (CGA of w/c for stabilization with sit<>standing at GB)   Bed, Chair, Wheelchair Transfer Standby Assist   Bed Chair Wheelchair Transfer Description Supervision for safety;Set-up of equipment  (squat pivot EOB > w/c)   Tub / Shower Transfers Minimal Assist   Tub Shower Transfer Description Shower bench;Increased time;Set-up of equipment;Supervision for safety;Verbal cueing  (Min A for w/c stabilization and scooting during lateral scoot w/c<>shower bench.)   Interdisciplinary Plan of Care Collaboration   IDT Collaboration with  Nursing   Patient Position at End of Therapy Seated;Self Releasing Lap Belt  Applied  (Pt left seated in w/c at sink completing grooming.)   Collaboration Comments Nursing completed R-foot dressing change only following bathing.         Assessment    Pt making good progress towards goals with self care, but limited by w/c failure during functional transfers. PT to assist with w/c  Strengths: Able to follow instructions, Alert and oriented, Effective communication skills, Good insight into deficits/needs, Independent prior level of function, Motivated for self care and independence, Pleasant and cooperative, Supportive family, Willingly participates in therapeutic activities  Barriers: Decreased endurance, Fatigue, Generalized weakness, Home accessibility, Impaired activity tolerance, Impaired balance, Limited mobility, Pain, Pain poorly managed    Plan    BUE strengthening, functional transfers this PM, Simple meal prep tomorrow at w/c level        Occupational Therapy Goals (Active)       Problem: Bathing       Dates: Start: 03/22/23         Goal: STG-Within one week, patient will bathe at CGA level using DME/AE PRN.       Dates: Start: 03/22/23               Problem: OT Long Term Goals       Dates: Start: 03/22/23         Goal: LTG-By discharge, patient will complete basic self care tasks at Min A-Mod I level using DME/AE PRN.       Dates: Start: 03/22/23            Goal: LTG-By discharge, patient will perform bathroom transfers at SBA-Mod I level using DME/AE PRN.       Dates: Start: 03/22/23            Goal: LTG-By discharge, patient will complete basic home management at Min A-Mod I level using DME/AE PRN.       Dates: Start: 03/22/23

## 2023-04-06 NOTE — CARE PLAN
Problem: PT-Long Term Goals  Goal: LTG-By discharge, patient will propel wheelchair household distances with Mariano.   Outcome: Progressing  Note: Em to SetupA for occasional help needed for leg rest management, cues for parts management (arm rests, brakes).  Goal: LTG-By discharge, patient will perform home exercise program independently.   Outcome: Progressing  Note: Progressing, Em for form and postural alignment.   Goal: LTG-By discharge, patient will transfer in/out of a car with supervision.   Outcome: Progressing  Note: Needs assessment.   Goal: LTG-By discharge, patient will complete bed mobility with Mariano.   Outcome: Progressing  Note: Needs assessment.  Goal: LTG-By discharge, patient will complete w/c to bed transfer using squat pivot vs slide board Mariano.   Outcome: Progressing

## 2023-04-06 NOTE — PROGRESS NOTES
"  Physical Medicine & Rehabilitation Progress Note    Encounter Date: 4/6/2023    Chief Complaint: Decreased mobility, leg pain    Interval Events (Subjective):  Patient sitting up in room. He reports still having fullness and loss of hearing in ear. Examination did not show bulging or erythema but ongoing after clean out. Will start amoxicillin and monitor. Discussed would have IDT later today to discuss discharge planning.     _____________________________________  Interdisciplinary Team Conference   Most recent IDT on 4/6/2023    IKeaton M.D./Ph.D., was present and led the interdisciplinary team conference on 4/6/2023.  I led the IDT conference and agree with the IDT conference documentation and plan of care as noted below.     Nursing:  Diet Current Diet Order   Procedures    Diet Order Diet: Regular       Eating ADL Independent      % of Last Meal  Oral Nutrition: Between 50-75% Consumed, Breakfast   Sleep    Bowel Last BM: 04/05/23   Bladder Continent   Barriers to Discharge Home:  Pain management  Right foot ulcer    Physical Therapy:  Bed Mobility    Transfers Contact Guard Assist  Other (comment), Verbal cueing (SBA for squat pivot, CGA for stand pivot c/ FWW)   Mobility Contact Guard Assist   Stairs    Barriers to Discharge Home:  Limited by pain    Hopping improving    Occupational Therapy:  Grooming Modified Independent, Seated   Bathing Supervision   UB Dressing Independent   LB Dressing Contact Guard Assist   Toileting Contact Guard Assist   Shower & Transfer    Barriers to Discharge Home:  Problem solving     Rec Therapy:  Working on anxiety    Case Management:  Continues to work on disposition and DME needs.      Discharge Date/Disposition:  4/11/23  _____________________________________      Objective:  VITAL SIGNS: BP 98/61   Pulse 60   Temp 36.5 °C (97.7 °F)   Resp 18   Ht 1.8 m (5' 10.87\")   Wt 95.8 kg (211 lb 3.2 oz)   SpO2 98%   BMI 29.57 kg/m²   Gen: NAD  Psych: Mood " and affect appropriate  CV: RRR, 0 edema  Resp: CTAB, no upper airway sounds  Abd: NTND  Neuro: AOx4, following commands  Unchanged from 4/5/23    Laboratory Values:  No results found for this or any previous visit (from the past 72 hour(s)).      Medications:  Scheduled Medications   Medication Dose Frequency    simethicone  125 mg TID    pregabalin  200 mg TID    hydrocortisone   TID    nicotine  7 mg Daily-0600    multivitamin  1 Tablet DAILY    morphine  30 mg TID    senna-docusate  2 Tablet BID    acetaminophen  650 mg Q6HRS    calcium citrate  950 mg DAILY    DULoxetine  60 mg DAILY    morphine ER  30 mg Q12HRS    omeprazole  40 mg DAILY    thiamine  100 mg DAILY    vitamin D2 (Ergocalciferol)  50,000 Units Q7 DAYS     PRN medications: sodium chloride, capsaicin, Respiratory Therapy Consult, hydrALAZINE, acetaminophen, senna-docusate **AND** polyethylene glycol/lytes **AND** magnesium hydroxide **AND** bisacodyl, mag hydrox-al hydrox-simeth, ondansetron **OR** ondansetron, traZODone, sodium chloride, carboxymethylcellulose, methocarbamol, mirtazapine, morphine, zolpidem    Diet:  Current Diet Order   Procedures    Diet Order Diet: Regular       Medical Decision Making and Plan:   L BKA - Patient with osteomyelitis while on DMARD for RA with multiple foot wounds. Patient is s/p left BKA on 3/9/23 with Dr. Chambers. NWB LLE. Needs IPOP vs extension brace.   -PT and OT for mobility and ADLs. Per guidelines, 15 hours per week between PT, OT and/or SLP.  -Follow-up Dr. Chambers. Continue sutures until follow-up with Dr. Chambers     RA - Previously on medication. Currently on hold.      Tobacco Abuse - Patient on nicotine patch. Counseled      Anemia - Check AM CBC - 9.0 on admission, will monitor. Recheck 2/27 with Fe panel. Fe panel - low Iron. Start supplement. Repeat 9.8, will monitor     Hyponatremia - Check AM CMP - 132, will continue to monitor. Recheck 2/27 - 137, improved     Hypocalcemia - Check AM CMP  - 8.3 on admission     Morbid obesity due to excess calories - BMI of 35.3 on admission, meets medical criteria. Dietitian to consult     Neuropathy - Patient on Lyrica TID. Continue Lyrica at current dose. Worsening night time shooting pain. Increase to 150/150/200 mg and check AM CBC/CMP/Mag. Labs stable, improved night time pain control. Continue Lyrica 150-150-200. Recheck AM labs on 4/3/23. Stable Cr increase to 200 mg TID  -Continue Lyrica 200 mg TID. Add Gabapentin 300 mg     Adjustment disorder with depression - Patient with adjustment disorder to new disability. He is on Cymbalta 60 mg.    Pain - Patient on Morphine ER, Morphine IR PRN, Tylenol, and Lyrica  -Schedule Morphine , 1130, and 1500. Continue scheduled Morphine IR and ER as well as lyrica. Will recheck Cr 2/27 - stable Cr at 0.49    Ear pain - No improvement after docusate and no notable wax in external canal as of 4/6/23. Will start Omnicef     Skin - Patient at risk for skin breakdown due to debility in areas including sacrum, achilles, elbows and head in addition to other sites. Nursing to assess skin daily.   -Rash on 3/28, start hydrocortisone.      GI Ppx - Patient on Prilosec for GERD prophylaxis. Patient on Senna-docusate for constipation prophylaxis.   -Abdominal discomfort 4/4/23, check KUB and start Simethicone      DVT Ppx - Patient refusing AC on transfer. Patient reports he is aware of risks.  ____________________________________    T. Loi Pisano MD./PhD.  [facility] - Physical Medicine & Rehabilitation   [facility] Medicine   ____________________________________    Total time:  51 minutes. Time spent included pre-rounding review of vitals and tests, unit/floor time, face-to-face time with the patient including physical examination, care coordination, counseling of patient and/or family, ordering medications/procedures/tests, discussion with CM, PT, OT, SLP and/or other healthcare providers, and documentation in the  electronic medical record. Topics discussed included discharge planning, gabapentin, inner ear pain and hearing changes, and start Abx. Patient was discussed separately in IDT today; please see details above.

## 2023-04-06 NOTE — THERAPY
"Physical Therapy   Daily Treatment     Patient Name: Richard Hubbard II  Age:  47 y.o., Sex:  male  Medical Record #: [id number]  Today's Date: 4/6/2023     Precautions  Precautions: Fall Risk, Non Weight Bearing Left Lower Extremity, Weight Bearing As Tolerated Right Lower Extremity  Comments: L BKA, RA - L shoulder and R wrist exacerbation. R and L wrist brace, L knee immobilizer donned in bed for ambulation and standing transfers; R foot wound with offloading shoe OOB    Subjective    \"I'm doing okay-- I'm glad I get to work with someone who knows me today.\" Pt in w/c at arrival, agreeable to PT tx.    Seen for 2 sessions:  [id number]  [id number]     Objective  [id number]     04/06/23 1001   PT Charge Group   PT Neuromuscular Re-Education / Balance (Units) 3   PT Therapeutic Activities (Units) 1   PT Total Time Spent   PT Individual Total Time Spent (Mins) 60   Transfer Functional Level of Assist   Bed, Chair, Wheelchair Transfer Contact Guard Assist   Bed Chair Wheelchair Transfer Description Other (comment);Verbal cueing  (SBA for squat pivot, CGA for stand pivot c/ FWW)   Neuro-Muscular Treatments   Neuro-Muscular Treatments Postural Facilitation;Postural Changes;Tactile Cuing;Verbal Cuing;Other (See Comments);Facilitation;Inhibition  (mirror therapy)   Comments progressive postural training in seated and standing; see note   Interdisciplinary Plan of Care Collaboration   IDT Collaboration with  Occupational Therapist;Therapy Tech   Patient Position at End of Therapy Seated;In Bed   Collaboration Comments w/c needs     NMRE: progressive postural training for postural sarthak, midline control and endurance in seated and standing  Seated:   Midline holds c/ UE support 5 x 10\", then without UE support 5 x 10\", then with UE reaches (OH/shldr flx) 1 x 5 each, then BUE 1 x 5, then combined unilateral + bilateral 1 x 5   Midline holds c/ perturbations and trunk and UE 1 x 5 x 15\" holds  Mirror therapy c/ midline " "control  Knee flex/ext 2 x 8  Hip flx 3 x 10  Hip abd 1 x 15  DF 3 x 10  Standing:  C/ UE on FWW, static holds 1 x 8 x 5\", then c/ alt trunk perturbations 3 x 10\"    TA: progressive transfer practice with cues for postural elongation, offweighting LE, and pivot; full practice mat <> w/c x 3 at CGA<>Lisa; 1 LOB on initial descent due to not reaching UE back to chair to control descent    Seated rests btw activities.    Education: postural control and endurance to support mobility and decrease pain; mirror therapy for pain management of residual limb    [id number]   04/06/23 1245   PT Charge Group   PT Therapeutic Activities (Units) 2   PT Total Time Spent   PT Individual Total Time Spent (Mins) 30   Wheelchair Functional Level of Assist   Wheelchair Assist Supervised   Distance Wheelchair (Feet or Distance) 125   Wheelchair Description Leg rest management   Transfer Functional Level of Assist   Bed, Chair, Wheelchair Transfer Contact Guard Assist   Bed Chair Wheelchair Transfer Description Verbal cueing  (cues for FWW management, sequencing, steadying for SPT)   Neuro-Muscular Treatments   Neuro-Muscular Treatments Anterior weight shift;Postural Facilitation;Postural Changes   Comments STS c/ FWW <> w/c and EOM 1 x 5, perturbations to cue cocontraction in standing x 10\"   Interdisciplinary Plan of Care Collaboration   IDT Collaboration with  Occupational Therapist   Patient Position at End of Therapy Seated;Self Releasing Lap Belt Applied   Collaboration Comments CLOF     TA: continued practice of STS and SPT/stand-hop transfers from w/c and EOM; visual demo, MC/VC for sequencing, midline control, FWW management  STS as above from w/c and EOM, CGA for steadying  Full practice: stand pivot bed>w/c, w/c<>mat x 2 reps; then stand-hop 2 x 2-3' c/ FWW on soft surface to simulate carpet at home with pivot and backing to mat and w/c, CGA for incidental steadying  Seated breaks btw activities.     Assessment    Jeovanny improving " in postural midline and endurance. Able to progress SPT c/ FWW at CGA, VC for hand placement on w/c to push from R side to rise, control descent to w/c. Improving ability to self cue and recall setup for transfers, leg rest management, and body mechanics for effective transfers. WB tolerance continues limited by UE fatigue and R foot/ankle pain related to foot wound and RA.     Strengths: Able to follow instructions, Effective communication skills, Good insight into deficits/needs, Independent prior level of function  Barriers: Fatigue, Generalized weakness, Home accessibility, Impaired activity tolerance, Impaired balance, Pain, Pain poorly managed, Pressure ulcer    Plan    Abdominal/trunk postural strengthening, Shuttle, piriformis stretch L side, Seated dynamic balance, BLE strengthening, prone lying 20-30minutes per day (patient able to perform independently), HEP for LE strengthening, bed mobility, transfers squat pivot, standing tolerance/balance with LRAD, w/c mobility/ramp negotiation, contracture prevention, residual limb desensitization, Independent residual limb wrapping, Prosthetic and amputee education     Passport items to be completed:  Get in/out of bed safely, in/out of a vehicle, safely use mobility device, walk or wheel around home/community, navigate up and down stairs, show how to get up/down from the ground, ensure        Physical Therapy Problems (Active)       Problem: PT-Long Term Goals       Dates: Start: 03/22/23         Goal: LTG-By discharge, patient will propel wheelchair household distances with Mariano.        Dates: Start: 03/22/23         Goal Note filed on 04/06/23 0820 by Lorraine Healy, PT       Em to Setup[employer] for occasional help needed for leg rest management, cues for parts management (arm rests, brakes).              Goal: LTG-By discharge, patient will perform home exercise program independently.        Dates: Start: 03/22/23         Goal Note filed on 04/06/23 0820 by Lorraine  Niraj, PT       Progressing, Em for form and postural alignment.               Goal: LTG-By discharge, patient will transfer in/out of a car with supervision.        Dates: Start: 03/22/23         Goal Note filed on 04/06/23 0820 by Lorraine Healy, PT       Needs assessment.               Goal: LTG-By discharge, patient will complete bed mobility with Mariano.        Dates: Start: 03/22/23         Goal Note filed on 04/06/23 0820 by Lorraine Healy, PT       Needs assessment.              Goal: LTG-By discharge, patient will complete w/c to bed transfer using squat pivot vs slide board Mariano.        Dates: Start: 03/22/23

## 2023-04-06 NOTE — CARE PLAN
Problem: Recreation Therapy  Goal: STG-Within one week, patient will identify two new leisure activities.   Outcome: Met  Goal: STG-Within one week, patient will identify adaptations for previous leisure skills.   Outcome: Progressing  Goal: LTG-By discharge, patient will identify three new leisure activities.   Outcome: Met  Goal: LTG-By discharge, patient will identify and demonstrate adaptations for previous leisure skills.   Outcome: Progressing

## 2023-04-06 NOTE — DISCHARGE PLANNING
Case Management/IDT follow up.   IDT continues to recommend IRF level of care as patient continue to make progress with all therapies.   Dc date set for 4/11      DC needs: Recommendations made for home health for PT/OT/SLP; dme orderd; follow up with pcp, ortho, and dr juarez out pt   Wc/cushion and padded drop arm commode ordered     Met with pt;  provided update from IDT and discussed plan of care; informed him therapy recommends a GEL cushion which was ordered but it may not be covered by insurance - other option would be for purchase on [employer].     Plan:  Continue to follow .

## 2023-04-06 NOTE — PROGRESS NOTES
Patient care assumed. Report received from [facility] BARBARA Peters. Patient is alert and calm, resting in bed. Call light and bedside table within reach. Will continue to monitor.

## 2023-04-06 NOTE — THERAPY
"Occupational Therapy  Daily Treatment     Patient Name: Richard Hubbard II  Age:  47 y.o., Sex:  male  Medical Record #: [id number]  Today's Date: 4/6/2023     Precautions  Precautions: Fall Risk, Non Weight Bearing Left Lower Extremity, Weight Bearing As Tolerated Right Lower Extremity  Comments: L BKA, RA - L shoulder and R wrist exacerbation. R and L wrist brace, L knee immobilizer donned in bed for ambulation and standing transfers; R foot wound with offloading shoe OOB         Subjective    \"Yeah, I've had wounds on my rear before.\"     Objective       04/06/23 1401   OT Charge Group   OT Therapy Activity (Units) 2   OT Total Time Spent   OT Individual Total Time Spent (Mins) 30   Functional Level of Assist   Toilet Transfers Contact Guard Assist   Toilet Transfer Description Adaptive equipment;Increased time;Set-up of equipment;Supervision for safety;Verbal cueing  (CGA for balance with acsent/decent to toilet and w/c using FWW with stand pivot. Sequencing with BUE push up from commode/wc and reach back with R-hand first for added support.)   Interdisciplinary Plan of Care Collaboration   IDT Collaboration with  Nursing;Physical Therapist   Patient Position at End of Therapy In Bed;Call Light within Reach;Tray Table within Reach   Collaboration Comments skin integrity, w/c setup, CLOF, POC     Discussed pt's skin integrity in the past and present with weight loss and previous wounds. Per nursing documentation, he has had redness during this admission. With pt's compromised skin integrity history, pt may benefit from specialty cushion for w/c upon DC home for wound prevention. Will collaborate with PT for appropriate setup.     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 Referred pt to CM for follow up on resources for added support from hired CG if needed.     Added new Left elevating leg rest to w/c with pt able to " demonstrate don/doff.    Assessment    Pt with improved transfers using new sequencing for toilet transfers. Nursing informed and board updated.  Strengths: Able to follow instructions, Alert and oriented, Effective communication skills, Good insight into deficits/needs, Independent prior level of function, Motivated for self care and independence, Pleasant and cooperative, Supportive family, Willingly participates in therapeutic activities  Barriers: Decreased endurance, Fatigue, Generalized weakness, Home accessibility, Impaired activity tolerance, Impaired balance, Limited mobility, Pain, Pain poorly managed    Plan    simple meal prep, BUE strengthening, functional transfers using FWW for BR.     Passport items to be completed:  Perform bathroom transfers, complete dressing, complete feeding, get ready for the day, prepare a simple meal, participate in household tasks, adapt home for safety needs, demonstrate home exercise program, complete caregiver training     Occupational Therapy Goals (Active)       Problem: Dressing       Dates: Start: 04/06/23         Goal: STG-Within one week, patient will dress LB at SPV level using DME/AE PRN.       Dates: Start: 04/06/23               Problem: Functional Transfers       Dates: Start: 04/06/23         Goal: STG-Within one week, patient will transfer to toilet at SPV level using DME/AE PRN.       Dates: Start: 04/06/23               Problem: OT Long Term Goals       Dates: Start: 03/22/23         Goal: LTG-By discharge, patient will complete basic self care tasks at Min A-Mod I level using DME/AE PRN.       Dates: Start: 03/22/23            Goal: LTG-By discharge, patient will perform bathroom transfers at SBA-Mod I level using DME/AE PRN.       Dates: Start: 03/22/23            Goal: LTG-By discharge, patient will complete basic home management at Min A-Mod I level using DME/AE PRN.       Dates: Start: 03/22/23               Problem: Toileting       Dates: Start:  04/06/23         Goal: STG-Within one week, patient will complete toileting tasks at SPV level using DME/AE PRN.       Dates: Start: 04/06/23

## 2023-04-07 NOTE — THERAPY
Occupational Therapy  Daily Treatment     Patient Name: Richard Hubbard II  Age:  47 y.o., Sex:  male  Medical Record #: [id number]  Today's Date: 4/7/2023     Precautions  Precautions: Fall Risk, Non Weight Bearing Left Lower Extremity, Weight Bearing As Tolerated Right Lower Extremity  Comments: L BKA, RA - L shoulder and R wrist exacerbation. R and L wrist brace, L knee immobilizer donned in bed for ambulation and standing transfers; R foot wound with offloading shoe OOB         Subjective    Pt seen 2x this day as detailed and listed below. Pt agreeable to OT sessions.     Objective       04/07/23 0901 04/07/23 1031   OT Charge Group   OT Therapy Activity (Units) 1 4   OT Therapeutic Exercise (Units) 1  --    OT Total Time Spent   OT Individual Total Time Spent (Mins) 30 60   Functional Level of Assist   Bed, Chair, Wheelchair Transfer Standby Assist  --    Bed Chair Wheelchair Transfer Description Increased time;Set-up of equipment;Squat pivot transfer to wheelchair;Supervision for safety  (SBA for SQPT EOB>w/c)  --    Sitting Upper Body Exercises   Chest Press 3 sets of 15;Bilateral;Weight (See Comments for lbs)  (6lb weighted ball)  --    Tricep Press 3 sets of 15;Bilateral;Weight (See Comments for lbs)  (40# rickshaw forward and 35# back while wearing bilateral wrist braces and w/c gloves for comfort)  --    IADL Treatments   IADL Treatments  --  Meal preparation   Meal Preparation  --  Pt completed moderately complicated meal prep from w/c level (tacos) with grossly Min A due to time constraints and positioning. Pt was able to access upper and lower cabinets, standing to reach with SBA for uppers using counter top for support. When attempting to chop and cut onion, he declined use of a lower counter top to simulate a table top at home, and ultimatly requested assist for task completion (OTR also completed chopping of lettice as well due to lack of leverage for pt to complete and time constraints). He  demonstrated fair attention to safety with novel electric stove top operation, turning on and off appropriately. Made 3 recommendations for safety including; turning the handle of pan over stove top to avoid knocking it down when passing, cutting away from self and on counter top (as pt attempted to cut open meat pack towards himself in his lap), and wathcing out for sleeves when adjusting knobs on stove top (due to his open flame gas stove at home). Continued discussion of making items more accessible at home to reduce standing needs.   Interdisciplinary Plan of Care Collaboration   IDT Collaboration with  Physical Therapist Physical Therapist   Patient Position at End of Therapy Seated;Self Releasing Lap Belt Applied Seated;Self Releasing Lap Belt Applied   Collaboration Comments pass off to PT at end of session. CLOF, POC       Assessment    Pt receptive to all recommendations during meal prep task. While pt did require Min A due to poor setup and time constraints, he was understanding of need to have a table top for prep, watching out for reaching over heat sources from w/c level, and increasing overall accessibility to frequently used items. He may require initial assist from his wife at home to procure the aforementioned recommendations to increase his safety and independence. He tolerated BUE well.    Strengths: Able to follow instructions, Alert and oriented, Effective communication skills, Good insight into deficits/needs, Independent prior level of function, Motivated for self care and independence, Pleasant and cooperative, Supportive family, Willingly participates in therapeutic activities  Barriers: Decreased endurance, Fatigue, Generalized weakness, Home accessibility, Impaired activity tolerance, Impaired balance, Limited mobility, Pain, Pain poorly managed    Plan    BUE strengthening, functional transfers using FWW for BR.     Passport items to be completed:  Perform bathroom transfers, complete  dressing, complete feeding, get ready for the day, prepare a simple meal, participate in household tasks, adapt home for safety needs, demonstrate home exercise program, complete caregiver training     Occupational Therapy Goals (Active)       Problem: Dressing       Dates: Start: 04/06/23         Goal: STG-Within one week, patient will dress LB at SPV level using DME/AE PRN.       Dates: Start: 04/06/23               Problem: Functional Transfers       Dates: Start: 04/06/23         Goal: STG-Within one week, patient will transfer to toilet at SPV level using DME/AE PRN.       Dates: Start: 04/06/23               Problem: OT Long Term Goals       Dates: Start: 03/22/23         Goal: LTG-By discharge, patient will complete basic self care tasks at Min A-Mod I level using DME/AE PRN.       Dates: Start: 03/22/23            Goal: LTG-By discharge, patient will perform bathroom transfers at SBA-Mod I level using DME/AE PRN.       Dates: Start: 03/22/23            Goal: LTG-By discharge, patient will complete basic home management at Min A-Mod I level using DME/AE PRN.       Dates: Start: 03/22/23               Problem: Toileting       Dates: Start: 04/06/23         Goal: STG-Within one week, patient will complete toileting tasks at SPV level using DME/AE PRN.       Dates: Start: 04/06/23

## 2023-04-07 NOTE — DISCHARGE PLANNING
CM  Met w / pt discussing dc plans; referral made to [facility] home health; provided info on [employer] (services [place]) for in home caregivers; advised him this is not covered thru insurance; private pay is $34-$36; pt confirms his two children will continue to go to day care during the day; there is limited family support; pt states his wife is not able to take time off of work; discussed need to schedule family training w/ his wife and offered to call her, but he stated he would call and confirm time for family training.     Addendum 4/7  Message received from pt's wife about pt not having an appt w/ [facility]; discussed w/ dr vega also  who indicates he will be able to write 2 weeks of pain meds which will give pt additional time to see Dipika @ [facility] in order not to violate pt's pain management contract.   Tc back to pt's wife Sailaja @ [phone]; message left on voice mail to confirm family training / dc prescriptions.      Tc to [facility] - spoke with MA who states she will call me back to confirm.

## 2023-04-07 NOTE — THERAPY
"Physical Therapy   Daily Treatment     Patient Name: Richard Hubbard II  Age:  47 y.o., Sex:  male  Medical Record #: [id number]  Today's Date: 4/7/2023     Precautions  Precautions: Fall Risk, Non Weight Bearing Left Lower Extremity, Weight Bearing As Tolerated Right Lower Extremity  Comments: L BKA, RA - L shoulder and R wrist exacerbation. R and L wrist brace, L knee immobilizer donned in bed for ambulation and standing transfers; R foot wound with offloading shoe OOB    Subjective    \"I'm doing alright-- I feel a little overwhelmed.\" Pt in w/c at arrival, agreeable to PT tx.      Objective       04/07/23 0931   PT Charge Group   PT Therapeutic Exercise (Units) 1   PT Neuromuscular Re-Education / Balance (Units) 2   PT Therapeutic Activities (Units) 1   PT Total Time Spent   PT Individual Total Time Spent (Mins) 60   Wheelchair Functional Level of Assist   Wheelchair Assist Modified Independent   Distance Wheelchair (Feet or Distance) 125   Wheelchair Description Extra time;Leg rest management   Stairs Functional Level of Assist   Level of Assist with Stairs Unable to Participate   Transfer Functional Level of Assist   Bed, Chair, Wheelchair Transfer Contact Guard Assist   Bed Chair Wheelchair Transfer Description Adaptive equipment;Increased time;Verbal cueing  (stand pivot c/ FWW <> w/c)   Sitting Lower Body Exercises   Ankle Pumps Bilateral  (2 x 20 c/ dynadisc for RLE ankle ROM facilitation + mirror for concurrent mirror therapy)   Other Exercises Pilates shuttle: 1 x 10 at 4 bands c/ 1 x 10 \"hops\" at top of ROM, 3 x 10 @ 5 bands full ROM, PF pulses at top of movement 3 x 10; then PF pulses c/ 1 band focusing on mm setting and isolation of ankle ROM from knee ROM   Neuro-Muscular Treatments   Neuro-Muscular Treatments Anterior weight shift;Weight Shift Right;Weight Shift Left;Verbal Cuing;Sequencing;Tactile Cuing;Facilitation;Postural Facilitation;Postural Changes    Standing balance c/ FWW: mirror for " visual FB  Standing c/ unilateral reaches 1 x 5 each, then crossing midline 1 x 5 each c/ concurrent postural facilitation  Dual task standing at kitchen counter placing and retrieving cones from variable heights from counter <>above shoulder level, opening and closing drawers, microwave,  and reaching outside of ABHISHEK, 2 x 1.5-2 min efforts c/ unilateral to bilateral UE support for balance, Em.   SPT practice c/ FWW <> shuttle w/c at CGA<>SBA btw therapy activities, VC to sequence STS + FWW management for hop + turn      Interdisciplinary Plan of Care Collaboration   IDT Collaboration with  Physical Therapist;Occupational Therapist   Patient Position at End of Therapy Seated;Self Releasing Lap Belt Applied  (wheeling back to room ad haider after therapy session Mariano)   Collaboration Comments CLOF, POC     Education: progress to date, pacing, and weighing priorities of daily life, asking for help. Motivational interviewing and encouragement to reflect on progress to date.     Assessment    Jeovanny needing increased external cues for facilitation of R gastroc in OKC and CKC positions. Improved carryover to hopping and offweighting RLE during pivot transfers, but continues to be limited by R foot pain/decreased ROM, decreased postural endurance and control.     Strengths: Able to follow instructions, Effective communication skills, Good insight into deficits/needs, Independent prior level of function  Barriers: Fatigue, Generalized weakness, Home accessibility, Impaired activity tolerance, Impaired balance, Pain, Pain poorly managed, Pressure ulcer    Plan  Car transfers.   Abdominal/trunk postural strengthening, Shuttle, piriformis stretch L side, Seated dynamic balance, BLE strengthening, prone lying 20-30minutes per day (patient able to perform independently), HEP for LE strengthening, bed mobility, transfers squat pivot, standing tolerance/balance with LRAD, w/c mobility/ramp negotiation, contracture  prevention, residual limb desensitization, Independent residual limb wrapping, Prosthetic and amputee education     Passport items to be completed:  Get in/out of bed safely, in/out of a vehicle, safely use mobility device, walk or wheel around home/community, navigate up and down stairs, show how to get up/down from the ground, ensure     Physical Therapy Problems (Active)       Problem: PT-Long Term Goals       Dates: Start: 03/22/23         Goal: LTG-By discharge, patient will propel wheelchair household distances with Mariano.        Dates: Start: 03/22/23         Goal Note filed on 04/06/23 0820 by Lorraine Healy, PT       Em to [employer] for occasional help needed for leg rest management, cues for parts management (arm rests, brakes).              Goal: LTG-By discharge, patient will perform home exercise program independently.        Dates: Start: 03/22/23         Goal Note filed on 04/06/23 0820 by Lorraine Healy, PT       Progressing, Em for form and postural alignment.               Goal: LTG-By discharge, patient will transfer in/out of a car with supervision.        Dates: Start: 03/22/23         Goal Note filed on 04/06/23 0820 by Lorraine Healy PT       Needs assessment.               Goal: LTG-By discharge, patient will complete bed mobility with Mariano.        Dates: Start: 03/22/23         Goal Note filed on 04/06/23 0820 by Lorraine Healy PT       Needs assessment.              Goal: LTG-By discharge, patient will complete w/c to bed transfer using squat pivot vs slide board Mariano.        Dates: Start: 03/22/23

## 2023-04-07 NOTE — CARE PLAN
The patient is Stable - Low risk of patient condition declining or worsening    Shift Goals  Clinical Goals: safety  Patient Goals: pain management    Problem: Pain - Standard  Goal: Alleviation of pain or a reduction in pain to the patient’s comfort goal  Outcome: Progressing Patient able to verbalize pain level and verbalize an acceptable level of pain.      Problem: Mobility  Goal: Patient's capacity to carry out activities will improve  Outcome: Progressing patient up participating in therapies, educated to take rest periods in between to avoid fatigue.

## 2023-04-07 NOTE — PROGRESS NOTES
"  Physical Medicine & Rehabilitation Progress Note    Encounter Date: 4/7/2023    Chief Complaint: Decreased mobility, leg pain    Interval Events (Subjective):  Patient sitting up in room. He reports the gabapentin did help. Discussed would continue for a few days and recheck labs on Monday. Discussed that he should contact his pain clinic and make sure they are OK with me writing him 2 weeks of pain medications until he has follow-up. Discussed he should verify his pain contract. Denies NVD    _____________________________________  Interdisciplinary Team Conference   Most recent IDT on 4/6/2023    Discharge Date/Disposition:  4/11/23 -> 4/12/23  _____________________________________      Objective:  VITAL SIGNS: BP 98/58   Pulse 70   Temp 36.5 °C (97.7 °F)   Resp 18   Ht 1.8 m (5' 10.87\")   Wt 95.8 kg (211 lb 3.2 oz)   SpO2 96%   BMI 29.57 kg/m²   Gen: NAD  Psych: Mood and affect appropriate  CV: RRR, 1+ edema  Resp: CTAB, no upper airway sounds  Abd: NTND  Neuro: AOx4, following commands    Laboratory Values:  No results found for this or any previous visit (from the past 72 hour(s)).      Medications:  Scheduled Medications   Medication Dose Frequency    cefdinir  300 mg Q12HRS    gabapentin  300 mg TID    simethicone  125 mg TID    pregabalin  200 mg TID    hydrocortisone   TID    nicotine  7 mg Daily-0600    multivitamin  1 Tablet DAILY    morphine  30 mg TID    senna-docusate  2 Tablet BID    acetaminophen  650 mg Q6HRS    calcium citrate  950 mg DAILY    DULoxetine  60 mg DAILY    morphine ER  30 mg Q12HRS    omeprazole  40 mg DAILY    thiamine  100 mg DAILY    vitamin D2 (Ergocalciferol)  50,000 Units Q7 DAYS     PRN medications: sodium chloride, capsaicin, Respiratory Therapy Consult, hydrALAZINE, acetaminophen, senna-docusate **AND** polyethylene glycol/lytes **AND** magnesium hydroxide **AND** bisacodyl, mag hydrox-al hydrox-simeth, ondansetron **OR** ondansetron, traZODone, sodium chloride, " carboxymethylcellulose, methocarbamol, mirtazapine, morphine, zolpidem    Diet:  Current Diet Order   Procedures    Diet Order Diet: Regular       Medical Decision Making and Plan:   L BKA - Patient with osteomyelitis while on DMARD for RA with multiple foot wounds. Patient is s/p left BKA on 3/9/23 with Dr. Chambers. NWB LLE. Needs IPOP vs extension brace.   -PT and OT for mobility and ADLs. Per guidelines, 15 hours per week between PT, OT and/or SLP.  -Follow-up Dr. Chambers. Continue sutures until follow-up with Dr. Chambers     RA - Previously on medication. Currently on hold.      Tobacco Abuse - Patient on nicotine patch. Counseled      Anemia - Check AM CBC - 9.0 on admission, will monitor. Recheck 2/27 with Fe panel. Fe panel - low Iron. Start supplement. Repeat 9.8, will monitor     Hyponatremia - Check AM CMP - 132, will continue to monitor. Recheck 2/27 - 137, improved     Hypocalcemia - Check AM CMP - 8.3 on admission     Morbid obesity due to excess calories - BMI of 35.3 on admission, meets medical criteria. Dietitian to consult     Neuropathy - Patient on Lyrica TID. Continue Lyrica at current dose. Worsening night time shooting pain. Increase to 150/150/200 mg and check AM CBC/CMP/Mag. Labs stable, improved night time pain control. Continue Lyrica 150-150-200. Recheck AM labs on 4/3/23. Stable Cr increase to 200 mg TID  -Continue Lyrica 200 mg TID. Add Gabapentin 300 mg. Continue Gabapentin. Check labs 4/10, follow-up about pain contract    Adjustment disorder with depression - Patient with adjustment disorder to new disability. He is on Cymbalta 60 mg.    Pain - Patient on Morphine ER, Morphine IR PRN, Tylenol, and Lyrica  -Schedule Morphine , 1130, and 1500. Continue scheduled Morphine IR and ER as well as lyrica. Will recheck Cr 2/27 - stable Cr at 0.49    Ear pain - No improvement after docusate and no notable wax in external canal as of 4/6/23. Will start Omnicef     Skin - Patient at  risk for skin breakdown due to debility in areas including sacrum, achilles, elbows and head in addition to other sites. Nursing to assess skin daily.   -Rash on 3/28, start hydrocortisone.      GI Ppx - Patient on Prilosec for GERD prophylaxis. Patient on Senna-docusate for constipation prophylaxis.   -Abdominal discomfort 4/4/23, check KUB and start Simethicone      DVT Ppx - Patient refusing AC on transfer. Patient reports he is aware of risks.  ____________________________________    T. Loi Pisano MD./PhD.  [facility] - Physical Medicine & Rehabilitation   [facility] - Brain Injury Medicine   ____________________________________

## 2023-04-07 NOTE — THERAPY
Physical Therapy   Daily Treatment     Patient Name: Richard Hubbard II  Age:  47 y.o., Sex:  male  Medical Record #: [id number]  Today's Date: 4/7/2023     Precautions  Precautions: Fall Risk, Non Weight Bearing Left Lower Extremity, Weight Bearing As Tolerated Right Lower Extremity  Comments: L BKA, RA - L shoulder and R wrist exacerbation. R and L wrist brace, L knee immobilizer donned in bed for ambulation and standing transfers; R foot wound with offloading shoe OOB    Subjective    Patient received sleeping but agreeable to PT; thankful for arrival of clean laundry; requesting to use urinal, declines toilet transfer.     Objective       04/07/23 1531   PT Charge Group   PT Therapeutic Activities (Units) 2   PT Total Time Spent   PT Individual Total Time Spent (Mins) 30   Wheelchair Functional Level of Assist   Wheelchair Assist Supervised   Distance Wheelchair (Feet or Distance) 125 ft t/from gym; not limited by fatigue   Wheelchair Description Extra time;Leg rest management  (path finding cues prn)   Transfer Functional Level of Assist   Bed, Chair, Wheelchair Transfer Contact Guard Assist   Bed Chair Wheelchair Transfer Description Adaptive equipment;Increased time;Non-hospital bed;Set-up of equipment;Supervision for safety;Verbal cueing  (3 reps with FWW, 1 rep with reach-pivot using bedrail; improved quality and control with FWW noted; pt donned residual limb protector at Setup A level and doffed IND, reviewed education on limb protection and call light usage.)   Bed Mobility    Supine to Sit Modified Independent   Sit to Supine Modified Independent   Sit to Stand Contact Guard Assist   Scooting Supervised   Rolling Modified Independent   Interdisciplinary Plan of Care Collaboration   IDT Collaboration with  Physical Therapist;Occupational Therapist;Nursing   Patient Position at End of Therapy In Bed;Call Light within Reach;Tray Table within Reach;Phone within Reach   Collaboration Comments Treatment  plan for session; Urine output         Assessment    Patient benefits from external cueing for sequencing or safety; improving hand placement noted with sit<>stands with only one minor LOB but self-cued to increased forward trunk lean when standing; increased performance time; very receptive and motivated; pleasant; increased rest breaks time for RA management but no increased pain noted this session.    Strengths: Able to follow instructions, Effective communication skills, Good insight into deficits/needs, Independent prior level of function  Barriers: Fatigue, Generalized weakness, Home accessibility, Impaired activity tolerance, Impaired balance, Pain, Pain poorly managed, Pressure ulcer    Plan    D/C scheduled for 4/11/23  Abdominal/trunk postural strengthening, Shuttle, piriformis stretch L side, Seated dynamic balance, BLE strengthening, prone lying 20-30minutes per day (patient able to perform independently), HEP for LE strengthening, bed mobility, transfers squat pivot, standing tolerance/balance with LRAD, w/c mobility/ramp negotiation, contracture prevention, residual limb desensitization, Independent residual limb wrapping, Prosthetic and amputee education     Passport items to be completed:  Get in/out of bed safely, in/out of a vehicle, safely use mobility device, walk or wheel around home/community, navigate up and down stairs, show how to get up/down from the ground    Physical Therapy Problems (Active)       Problem: PT-Long Term Goals       Dates: Start: 03/22/23         Goal: LTG-By discharge, patient will propel wheelchair household distances with Mariano.        Dates: Start: 03/22/23         Goal Note filed on 04/06/23 0820 by Lorraine Healy, PT       Em to SetupA for occasional help needed for leg rest management, cues for parts management (arm rests, brakes).              Goal: LTG-By discharge, patient will perform home exercise program independently.        Dates: Start: 03/22/23         Goal  Note filed on 04/06/23 0820 by Lorraine Healy, PT       Progressing, Em for form and postural alignment.               Goal: LTG-By discharge, patient will transfer in/out of a car with supervision.        Dates: Start: 03/22/23         Goal Note filed on 04/06/23 0820 by Lorraine Healy PT       Needs assessment.               Goal: LTG-By discharge, patient will complete bed mobility with Mariano.        Dates: Start: 03/22/23         Goal Note filed on 04/06/23 0820 by Lorraine Healy PT       Needs assessment.              Goal: LTG-By discharge, patient will complete w/c to bed transfer using squat pivot vs slide board Mariano.        Dates: Start: 03/22/23

## 2023-04-07 NOTE — CARE PLAN
"The patient is Stable - Low risk of patient condition declining or worsening    Shift Goals  Clinical Goals: safety  Patient Goals: pain management    Progress made toward(s) clinical / shift goals:      Problem: Pain - Standard  Goal: Alleviation of pain or a reduction in pain to the patient’s comfort goal  Outcome: Progressing  Note: Pain meds given as scheduled and PRN per MAR for c/o left BKA/phantom pain with adequate relief. Pt sleeps good. Will continue to monitor.     Problem: Fall Risk - Rehab  Goal: Patient will remain free from falls  Outcome: Progressing  Note: Osiris Healy Fall risk Assessment Score: 14    Moderate fall risk Interventions  - Bed and strip alarm   - Yellow sign by the door   - Yellow wrist band \"Fall risk\"  - Do not leave patient unattended in the bathroom  - Fall risk education provided    Pt calls appropriately when in need of assistance.         Patient is not progressing towards the following goals:      "

## 2023-04-08 NOTE — CARE PLAN
The patient is Stable - Low risk of patient condition declining or worsening    Shift Goals  Clinical Goals: safety  Patient Goals: pain management    Progress made toward(s) clinical / shift goals:      Problem: Pain - Standard  Goal: Alleviation of pain or a reduction in pain to the patient’s comfort goal  Outcome: Progressing  Note: Pain meds given as scheduled and PRN per MAR for c/o left BKA/phantom pain with adequate relief. Pt sleeps good. Will continue to monitor.     Problem: Bladder / Voiding  Goal: Patient will establish and maintain regular urinary output  Outcome: Progressing  Note: Pt continent of bladder. Voiding adequately using urinal at night. He denies dysuria.       Patient is not progressing towards the following goals:

## 2023-04-08 NOTE — CARE PLAN
The patient is Stable - Low risk of patient condition declining or worsening    Shift Goals  Clinical Goals: safety  Patient Goals: pain management    Progress made toward(s) clinical / shift goals:    Problem: Pain - Standard  Goal: Alleviation of pain or a reduction in pain to the patient’s comfort goal  Outcome: Progressing  Pain managed well with PRN medication at this time.  Patient uses pharmacological and non pharmacological pain-relief strategies. Patient displays improvement in mood, coping.      Problem: Skin Integrity  Goal: Skin integrity is maintained or improved  Outcome: Progressing   Pressure relieving mattress in use, turned q 2 hrs, barrier cream in use.      Patient is not progressing towards the following goals:

## 2023-04-09 NOTE — CARE PLAN
Problem: Knowledge Deficit - Standard  Goal: Patient and family/care givers will demonstrate understanding of plan of care, disease process/condition, diagnostic tests and medications  Outcome: Progressing     Problem: Skin Integrity  Goal: Skin integrity is maintained or improved  Note: Patient's skin is maintained and free from new or accidental injury this shift.  Will continue to monitor.    The patient is Stable - Low risk of patient condition declining or worsening    Shift Goals  Clinical Goals: Safety  Patient Goals: pain management

## 2023-04-09 NOTE — CARE PLAN
"  Problem: Pain - Standard  Goal: Alleviation of pain or a reduction in pain to the patient’s comfort goal  Note: Educate patient of non-pharmacological comfort measures: repositioning, relaxation/breathing technique, cold compress and activities.      Problem: Infection  Goal: Patient will remain free from infection  Note: Continues Omnicef.     Problem: Fall Risk - Rehab  Goal: Patient will remain free from falls  Note: Osiris Healy Fall risk Assessment Score: 13      Moderate fall risk Interventions  - Bed and strip alarm   - Yellow sign by the door   - Yellow wrist band \"Fall risk\"  - Room near to the nurse station  - Do not leave patient unattended in the bathroom  - Fall risk education provided                The patient is Stable - Low risk of patient condition declining or worsening    Shift Goals  Clinical Goals: safety  Patient Goals: pain management      "

## 2023-04-09 NOTE — THERAPY
Physical Therapy   Daily Treatment     Patient Name: Richard Hubbard II  Age:  47 y.o., Sex:  male  Medical Record #: [id number]  Today's Date: 4/9/2023     Precautions  Precautions: (P) Fall Risk, Non Weight Bearing Left Lower Extremity, Weight Bearing As Tolerated Right Lower Extremity  Comments: L BKA, RA - L shoulder and R wrist exacerbation. R and L wrist brace, L knee immobilizer donned in bed for ambulation and standing transfers; R foot wound with offloading shoe OOB    Subjective    Pt is pleasant and cooperative throughout session        Objective       04/09/23 1100   PT Charge Group   PT Therapeutic Exercise (Units) 1   PT Therapeutic Activities (Units) 1   PT Total Time Spent   PT Individual Total Time Spent (Mins) 30   Precautions   Precautions Fall Risk;Non Weight Bearing Left Lower Extremity;Weight Bearing As Tolerated Right Lower Extremity   Bed Mobility    Sit to Stand Standby Assist   Interdisciplinary Plan of Care Collaboration   Patient Position at End of Therapy Seated     Pt self propelled w/c to therapy gym with supervision.     Pt performed standing activity at mat table and was required to stand and manipulate objects from outside his lateral and anterior/posterior base of support cross body to the opposite side of where the object was placed. He was able to stand with SBA for 10 mins.     Motomed BUE G4 10 mins seated in w/c.     Assessment    Pt tolerated session well and was happy with his ability to perform standing balance for 10 mins.   Strengths: Able to follow instructions, Effective communication skills, Good insight into deficits/needs, Independent prior level of function  Barriers: Fatigue, Generalized weakness, Home accessibility, Impaired activity tolerance, Impaired balance, Pain, Pain poorly managed, Pressure ulcer    Plan    Continue to progress pt's functional independence in anticipation of upcoming discharge.     Passport items to be completed:  Get in/out of bed  safely, in/out of a vehicle, safely use mobility device, walk or wheel around home/community, navigate up and down stairs, show how to get up/down from the ground, ensure home is accessible, demonstrate HEP, complete caregiver training    Physical Therapy Problems (Active)       Problem: PT-Long Term Goals       Dates: Start: 03/22/23         Goal: LTG-By discharge, patient will propel wheelchair household distances with Mariano.        Dates: Start: 03/22/23         Goal Note filed on 04/06/23 0820 by Lorraine Healy, PT       Em to SetupA for occasional help needed for leg rest management, cues for parts management (arm rests, brakes).              Goal: LTG-By discharge, patient will perform home exercise program independently.        Dates: Start: 03/22/23         Goal Note filed on 04/06/23 0820 by Lorraine Healy, PT       Progressing, Em for form and postural alignment.               Goal: LTG-By discharge, patient will transfer in/out of a car with supervision.        Dates: Start: 03/22/23         Goal Note filed on 04/06/23 0820 by Lorraine Healy, PT       Needs assessment.               Goal: LTG-By discharge, patient will complete bed mobility with Mariano.        Dates: Start: 03/22/23         Goal Note filed on 04/06/23 0820 by Lorraine Healy PT       Needs assessment.              Goal: LTG-By discharge, patient will complete w/c to bed transfer using squat pivot vs slide board Mariano.        Dates: Start: 03/22/23

## 2023-04-09 NOTE — THERAPY
"Recreational Therapy  Daily Treatment     Patient Name: Richard Hubbard II  AGE:  47 y.o., SEX:  male  Medical Record #: [id number]  Today's Date: 4/9/2023       Subjective    Pt was  positive and social during the session initiating many positive social integrations.      Objective       04/09/23 0900   Procedural Tracking   Procedural Tracking Leisure Skills Development;Leisure Skills Awareness   Treatment Time   Total Time Spent (mins) 60   Functional Ability Status - Cognitive   Attention Span Remains on Task   Comprehension Follows Three Step Commands   Judgment Able to Make Independent Decisions   Functional Ability Status - Emotional    Affect Appropriate;Bright   Mood Appropriate   Behavior Appropriate   Skilled Intervention    Skilled Intervention Social Skills;Relaxation / Coping Skills   Skilled Intervention Comments [employer] game with a group   Interdisciplinary Plan of Care Collaboration   IDT Collaboration with  Recreation Therapist;Therapy Tech   Patient Position at End of Therapy Seated   Collaboration Comments Recreation therapist dismissed session for Pt to return to his room.   Strengths & Barriers   Strengths Able to follow instructions;Alert and oriented;Effective communication skills;Good insight into deficits/needs;Motivated for self care and independence;Pleasant and cooperative;Supportive family;Willingly participates in therapeutic activities   Barriers Impaired balance;Pain         Assessment    Pt remained on task though out the session. He did remind the group that he was \"a little slower\" and required a little more time for decision making.     Strengths: (P) Able to follow instructions, Alert and oriented, Effective communication skills, Good insight into deficits/needs, Motivated for self care and independence, Pleasant and cooperative, Supportive family, Willingly participates in therapeutic activities  Barriers: (P) Impaired balance, Pain    Plan    DC Pt.     Passport items to be " completed:  Verbalize two positive leisure activities, discuss returning to work, hobbies, community groups or volunteer activities, explore community resources

## 2023-04-09 NOTE — THERAPY
Occupational Therapy  Daily Treatment     Patient Name: Richard Hubbard II  Age:  47 y.o., Sex:  male  Medical Record #: [id number]  Today's Date: 4/9/2023     Precautions  Precautions: Fall Risk, Non Weight Bearing Left Lower Extremity, Weight Bearing As Tolerated Right Lower Extremity  Comments: L BKA, RA - L shoulder and R wrist exacerbation. R and L wrist brace, L knee immobilizer donned in bed for ambulation and standing transfers; R foot wound with offloading shoe OOB         Subjective    Pt seated at EOB upon arrival, pleasant and cooperative, agreeable to therapy       Objective       04/09/23 1431   OT Charge Group   OT Therapy Activity (Units) 1   OT Neuromuscular Re-education/ Balance (Units) 3   OT Total Time Spent   OT Individual Total Time Spent (Mins) 60   Interdisciplinary Plan of Care Collaboration   Patient Position at End of Therapy Seated;Call Light within Reach;Tray Table within Reach     Don immobilizer independently    Bed to w/c squat pivot transfer SBA. W/c mobility room<>main gym Mod I including leg rests mgmt    BUE motomed CV endurance: 1 min x 2 with 1 min RB in between, high pace. Total distance completed .59 mile    Standing in // bars: unilateral reaches, hopping forward/backward x 2, Max manual perturbations, postural/midline control - scapular retraction, shoulder rolls x 10 each, lat depressions with attempts to come up on R toes    Standing activity in // bars: home clean-up simulation requiring unilateral reaches, turning, mini-squat when reaching low, and far reach outside of ABHISHEK. Pt instructed to  bean bags from side-table on R side, bean bags from low stool on L side, and bean bags hanged on the end of the rails and put back/tossed in the storage bucket positioned on the floor at the end of the // bars. Completed SBA with no LOB    Assessment    Pt cont to demo improvement with standing tolerance, endurance, and standing balance which is carrying-over and  progressing him towards more functional home mgmt tasks in standing    Strengths: Able to follow instructions, Alert and oriented, Effective communication skills, Good insight into deficits/needs, Independent prior level of function, Motivated for self care and independence, Pleasant and cooperative, Supportive family, Willingly participates in therapeutic activities  Barriers: Decreased endurance, Fatigue, Generalized weakness, Home accessibility, Impaired activity tolerance, Impaired balance, Limited mobility, Pain, Pain poorly managed    Plan    Refer to Primary OT POC/goals     Occupational Therapy Goals (Active)       Problem: Dressing       Dates: Start: 04/06/23         Goal: STG-Within one week, patient will dress LB at SPV level using DME/AE PRN.       Dates: Start: 04/06/23               Problem: Functional Transfers       Dates: Start: 04/06/23         Goal: STG-Within one week, patient will transfer to toilet at SPV level using DME/AE PRN.       Dates: Start: 04/06/23               Problem: OT Long Term Goals       Dates: Start: 03/22/23         Goal: LTG-By discharge, patient will complete basic self care tasks at Min A-Mod I level using DME/AE PRN.       Dates: Start: 03/22/23            Goal: LTG-By discharge, patient will perform bathroom transfers at SBA-Mod I level using DME/AE PRN.       Dates: Start: 03/22/23            Goal: LTG-By discharge, patient will complete basic home management at Min A-Mod I level using DME/AE PRN.       Dates: Start: 03/22/23               Problem: Toileting       Dates: Start: 04/06/23         Goal: STG-Within one week, patient will complete toileting tasks at SPV level using DME/AE PRN.       Dates: Start: 04/06/23

## 2023-04-10 NOTE — PROGRESS NOTES
"  Physical Medicine & Rehabilitation Progress Note    Encounter Date: 4/10/2023    Chief Complaint: Decreased mobility, leg pain    Interval Events (Subjective):  Patient sitting up in therapy gym. He reports therapy is going well. He denies NVD. He reports ongoing pain. Still requiring an extra night time morphine IR. Discussed will need follow-up with his pain clinic but can provide 2 week supply. His wife is unavailable for training until Wednesday.     _____________________________________  Interdisciplinary Team Conference   Most recent IDT on 4/6/2023    Discharge Date/Disposition:  4/11/23 -> 4/12/23  _____________________________________      Objective:  VITAL SIGNS: BP 99/52   Pulse 95   Temp 36.7 °C (98 °F)   Resp 18   Ht 1.8 m (5' 10.87\")   Wt 86.6 kg (191 lb)   SpO2 94%   BMI 26.74 kg/m²   Gen: NAD  Psych: Mood and affect appropriate  CV: RRR, 0 edema  Resp: CTAB, no upper airway sounds  Abd: NTND  Neuro: AOx4, following commands    Laboratory Values:  Recent Results (from the past 72 hour(s))   CBC WITH DIFFERENTIAL    Collection Time: 04/10/23  5:18 AM   Result Value Ref Range    WBC 5.0 4.8 - 10.8 K/uL    RBC 3.24 (L) 4.70 - 6.10 M/uL    Hemoglobin 9.3 (L) 14.0 - 18.0 g/dL    Hematocrit 30.2 (L) 42.0 - 52.0 %    MCV 93.2 81.4 - 97.8 fL    MCH 28.7 27.0 - 33.0 pg    MCHC 30.8 (L) 33.7 - 35.3 g/dL    RDW 48.7 35.9 - 50.0 fL    Platelet Count 230 164 - 446 K/uL    MPV 10.2 9.0 - 12.9 fL    Neutrophils-Polys 37.80 (L) 44.00 - 72.00 %    Lymphocytes 45.90 (H) 22.00 - 41.00 %    Monocytes 10.70 0.00 - 13.40 %    Eosinophils 3.80 0.00 - 6.90 %    Basophils 1.40 0.00 - 1.80 %    Immature Granulocytes 0.40 0.00 - 0.90 %    Nucleated RBC 0.00 /100 WBC    Neutrophils (Absolute) 1.88 1.82 - 7.42 K/uL    Lymphs (Absolute) 2.28 1.00 - 4.80 K/uL    Monos (Absolute) 0.53 0.00 - 0.85 K/uL    Eos (Absolute) 0.19 0.00 - 0.51 K/uL    Baso (Absolute) 0.07 0.00 - 0.12 K/uL    Immature Granulocytes (abs) 0.02 0.00 - " 0.11 K/uL    NRBC (Absolute) 0.00 K/uL   Basic Metabolic Panel    Collection Time: 04/10/23  5:18 AM   Result Value Ref Range    Sodium 139 135 - 145 mmol/L    Potassium 4.1 3.6 - 5.5 mmol/L    Chloride 104 96 - 112 mmol/L    Co2 27 20 - 33 mmol/L    Glucose 79 65 - 99 mg/dL    Bun 9 8 - 22 mg/dL    Creatinine 0.64 0.50 - 1.40 mg/dL    Calcium 8.3 (L) 8.5 - 10.5 mg/dL    Anion Gap 8.0 7.0 - 16.0   MAGNESIUM    Collection Time: 04/10/23  5:18 AM   Result Value Ref Range    Magnesium 1.9 1.5 - 2.5 mg/dL   ESTIMATED GFR    Collection Time: 04/10/23  5:18 AM   Result Value Ref Range    GFR (CKD-EPI) 118 >60 mL/min/1.73 m 2         Medications:  Scheduled Medications   Medication Dose Frequency    cefdinir  300 mg Q12HRS    gabapentin  300 mg TID    simethicone  125 mg TID    pregabalin  200 mg TID    hydrocortisone   TID    nicotine  7 mg Daily-0600    multivitamin  1 Tablet DAILY    morphine  30 mg TID    senna-docusate  2 Tablet BID    acetaminophen  650 mg Q6HRS    calcium citrate  950 mg DAILY    DULoxetine  60 mg DAILY    morphine ER  30 mg Q12HRS    omeprazole  40 mg DAILY    thiamine  100 mg DAILY    vitamin D2 (Ergocalciferol)  50,000 Units Q7 DAYS     PRN medications: sodium chloride, capsaicin, Respiratory Therapy Consult, hydrALAZINE, acetaminophen, senna-docusate **AND** polyethylene glycol/lytes **AND** magnesium hydroxide **AND** bisacodyl, mag hydrox-al hydrox-simeth, ondansetron **OR** ondansetron, traZODone, sodium chloride, carboxymethylcellulose, methocarbamol, mirtazapine, morphine, zolpidem    Diet:  Current Diet Order   Procedures    Diet Order Diet: Regular       Medical Decision Making and Plan:   L BKA - Patient with osteomyelitis while on DMARD for RA with multiple foot wounds. Patient is s/p left BKA on 3/9/23 with Dr. Chambers. NWB LLE. Needs IPOP vs extension brace.   -PT and OT for mobility and ADLs. Per guidelines, 15 hours per week between PT, OT and/or SLP.  -Follow-up Dr. Chambers.  Continue sutures until follow-up with Dr. Reyes CARVAJAL - Previously on medication. Currently on hold.      Tobacco Abuse - Patient on nicotine patch. Counseled      Anemia - Check AM CBC - 9.0 on admission, will monitor. Recheck 2/27 with Fe panel. Fe panel - low Iron. Start supplement. Repeat 9.3 on 4/10/23. Follow-up PCP     Hyponatremia - Check AM CMP - 132, will continue to monitor. Recheck 2/27 - 137, improved     Hypocalcemia - Check AM CMP - 8.3 on admission     Morbid obesity due to excess calories - BMI of 35.3 on admission, meets medical criteria. Dietitian to consult     Neuropathy - Patient on Lyrica TID. Continue Lyrica at current dose. Worsening night time shooting pain. Increase to 150/150/200 mg and check AM CBC/CMP/Mag. Labs stable, improved night time pain control. Continue Lyrica 150-150-200. Recheck AM labs on 4/3/23. Stable Cr increase to 200 mg TID  -Continue Lyrica 200 mg TID. Add Gabapentin 300 mg. Continue Gabapentin. Check labs 4/10, follow-up about pain contract    Adjustment disorder with depression - Patient with adjustment disorder to new disability. He is on Cymbalta 60 mg.    Pain - Patient on Morphine ER, Morphine IR PRN, Tylenol, and Lyrica  -Schedule Morphine , 1130, and 1500. Continue scheduled Morphine IR and ER as well as lyrica. Will recheck Cr 2/27 - stable Cr at 0.49    Ear pain - No improvement after docusate and no notable wax in external canal as of 4/6/23. Will start Omnicef. Improved. Last dose Wednesday morning before discharge, continue     Skin - Patient at risk for skin breakdown due to debility in areas including sacrum, achilles, elbows and head in addition to other sites. Nursing to assess skin daily.   -Rash on 3/28, start hydrocortisone.      GI Ppx - Patient on Prilosec for GERD prophylaxis. Patient on Senna-docusate for constipation prophylaxis.   -Abdominal discomfort 4/4/23, check KUB and start Simethicone      DVT Ppx - Patient refusing AC on  transfer. Patient reports he is aware of risks.  ____________________________________    T. Loi Pisano MD./PhD.  [facility] - Physical Medicine & Rehabilitation   [facility] - Brain Injury Medicine   ____________________________________

## 2023-04-10 NOTE — THERAPY
Physical Therapy   Daily Treatment     Patient Name: Richard Hubbard II  Age:  47 y.o., Sex:  male  Medical Record #: [id number]  Today's Date: 4/10/2023     Precautions  Precautions: (P) Fall Risk, Non Weight Bearing Left Lower Extremity, Weight Bearing As Tolerated Right Lower Extremity  Comments: (P) L BKA, RA - L shoulder and R wrist exacerbation. R and L wrist brace, L knee immobilizer donned in bed for ambulation and standing transfers; R foot wound with offloading shoe OOB    Subjective    Patient seen from 10:30-11:30am and 1:00-2:00pm, seated in w/c and agreeable to both sessions.     Objective       04/10/23 1031 04/10/23 1301   PT Charge Group   PT Therapeutic Exercise (Units)  --  3   PT Therapeutic Activities (Units) 4 1   PT Total Time Spent   PT Individual Total Time Spent (Mins) 60 60   Precautions   Precautions Fall Risk;Non Weight Bearing Left Lower Extremity;Weight Bearing As Tolerated Right Lower Extremity Fall Risk;Non Weight Bearing Left Lower Extremity;Weight Bearing As Tolerated Right Lower Extremity   Comments L BKA, RA - L shoulder and R wrist exacerbation. R and L wrist brace, L knee immobilizer donned in bed for ambulation and standing transfers; R foot wound with offloading shoe OOB L BKA, RA - L shoulder and R wrist exacerbation. R and L wrist brace, L knee immobilizer donned in bed for ambulation and standing transfers; R foot wound with offloading shoe OOB   Pain 0 - 10 Group   Location Leg  --    Location Orientation Right  --    Transfer Functional Level of Assist   Bed, Chair, Wheelchair Transfer Contact Guard Assist Contact Guard Assist   Bed Chair Wheelchair Transfer Description Adaptive equipment;Increased time;Initial preparation for task;Supervision for safety;Verbal cueing  (stand hop/pivot with FWW) Adaptive equipment;Increased time;Initial preparation for task;Supervision for safety  (stand hop/pivot with FWW)   Bed Mobility    Sit to Stand Standby Assist  (to CGA based  on set up) Standby Assist  (to CGA based on set up)   Interdisciplinary Plan of Care Collaboration   IDT Collaboration with  Occupational Therapist;Physician  --    Patient Position at End of Therapy Seated;Chair Alarm On;Self Releasing Lap Belt Applied;Call Light within Reach;Tray Table within Reach;Phone within Reach Seated;Self Releasing Lap Belt Applied;Call Light within Reach;Tray Table within Reach;Phone within Reach   Collaboration Comments CLOF, POC  --      10:30-11:30am session:   - Simulated car transfer x2 each direction from w/c to Nustep with yoga mat simulated car transfer, using FWW and CGA.   - Patient performing residual limb wrapping using ace wrap with supervision and intermittent cues. Patient able to perform effectively, will benefit from additional practice and providing video for home performance.   - Discussed discharge preparation including daily routine and incorporating HEP, day of discharge family training, and type of vehicle driving home in.    1:00-2:00pm session:   - RLE shuttle press with 3 bands, gel ice pack ace wrapped to R knee for discomfort management. 2x30 reps, as well as 2x20 performing 5 end-range pulses.   - Stand pivot transfer on/off shuttle with FWW and CGA-SBA.    Assessment    Patient tolerated sessions well, focus on d/c preparation and planning as well as RLE proximal strengthening. Patient demonstrating improving transfers with FWW.    Strengths: Able to follow instructions, Effective communication skills, Good insight into deficits/needs, Independent prior level of function  Barriers: Fatigue, Generalized weakness, Home accessibility, Impaired activity tolerance, Impaired balance, Pain, Pain poorly managed, Pressure ulcer    Plan    D/C IRF-[facility] and passport items. Anticipated d/c Wednesday to home with intermittent supervision/assist at w/c level.    Passport items to be completed:  Get in/out of bed safely, in/out of a vehicle, safely use mobility device, walk  or wheel around home/community, navigate up and down stairs, show how to get up/down from the ground, ensure home is accessible, demonstrate HEP, complete caregiver training    Physical Therapy Problems (Active)       Problem: PT-Long Term Goals       Dates: Start: 03/22/23         Goal: LTG-By discharge, patient will propel wheelchair household distances with Mariano.        Dates: Start: 03/22/23         Goal Note filed on 04/06/23 0820 by Lorraine Healy, PT       Em to Setup[employer] for occasional help needed for leg rest management, cues for parts management (arm rests, brakes).              Goal: LTG-By discharge, patient will perform home exercise program independently.        Dates: Start: 03/22/23         Goal Note filed on 04/06/23 0820 by Lorraine Healy, PT       Progressing, Em for form and postural alignment.               Goal: LTG-By discharge, patient will transfer in/out of a car with supervision.        Dates: Start: 03/22/23         Goal Note filed on 04/06/23 0820 by Lorraine Healy, PT       Needs assessment.               Goal: LTG-By discharge, patient will complete bed mobility with Mariano.        Dates: Start: 03/22/23         Goal Note filed on 04/06/23 0820 by Lorraine Healy, PT       Needs assessment.              Goal: LTG-By discharge, patient will complete w/c to bed transfer using squat pivot vs slide board Mariano.        Dates: Start: 03/22/23

## 2023-04-10 NOTE — CARE PLAN
"  Problem: Pain - Standard  Goal: Alleviation of pain or a reduction in pain to the patient’s comfort goal  Note: Educate patient of non-pharmacological comfort measures: repositioning, relaxation/breathing technique, cold compress and activities.      Problem: Infection  Goal: Patient will remain free from infection  Note: Continues Omnicef, will monitor.     Problem: Fall Risk - Rehab  Goal: Patient will remain free from falls  Note: Osiris Healy Fall risk Assessment Score: 13      Moderate fall risk Interventions  - Bed and strip alarm   - Yellow sign by the door   - Yellow wrist band \"Fall risk\"  - Room near to the nurse station  - Do not leave patient unattended in the bathroom  - Fall risk education provided         The patient is Stable - Low risk of patient condition declining or worsening    Shift Goals  Clinical Goals: Safety  Patient Goals: pain management        "

## 2023-04-10 NOTE — PROGRESS NOTES
"REHABILITATION PSYCHOLOGY FOLLOW-UP:  Reason for admission: Sepsis ([facility]) [A41.9]  S/P BKA (below knee amputation) unilateral, left ([facility]) [Z89.512]  Length of Visit: 54 minutes    Chief Complaint: Adjustment to BKA    S: Met with the patient for brief individual psychotherapy. Patient presented with a euthymic, somewhat dysphoric affect and reported a \"a little overwhelmed\" mood.  Patient reports impending discharge tomorrow and states that he is overwhelmed at the prospect of returning home after BKA to be a primary parent to his young children.  Session focused on strategies to manage overwhelm as well as helping patient to set SMART goals to help break larger tasks down into smaller achievable chunks and help improve self efficacy.  Patient is progressing as expected.  As patient is scheduled to discharge tomorrow if psychology is signing off    O: Psychiatric Examination: Mental status exam unchanged from previous note dated 3/29/2023  Vitals: Blood pressure 106/66, pulse 82, temperature 36.6 °C (97.9 °F), resp. rate 18, height 1.8 m (5' 10.87\"), weight 86.6 kg (191 lb), SpO2 94 %.  Musculoskeletal: Laying in bed normal psychomotor activity, no tics or unusual mannerisms noted  Appearance and Eye Contact: easily established rapport appropriate dress and grooming. Behavior is calm, cooperative,  appropriate eye contact  Attention/Alertness: Alert  Thought Process: Linear, Logical, and Goal Directed    Thought Content: No psychotic processes noted  Speech: Clear with normal rate and rhythm  Mood: \"doing pretty well overall\"            Affect: euthymic         SI/HI: Endorses     Memory: Recent and remote memory appear intact    Orientation: alert, oriented to person, place and time  Insight into symptoms: within normal limits  Judgement into symptoms:within normal limits     ASSESSMENT: Richard Hubbard II is a 47 y.o. male with a history of rheumatoid arthritis, deformities, foot ulcers who presented to the " hospital on 2/28/2023 with evidence of osteomyelitis resulting in altered mental left BKA due to infection.  Psychology was consulted due to history of bipolar disorder and depression, as well as difficulty with adjustment to BKA and difficulty with pain management.  Session focused on assessment of current functioning as well as psychoeducation regarding common cognitive and emotional impact of adjustment to BKA as well as hospitalization.  Patient self-reports a history of bipolar disorder diagnosis, denies any manic episode history.  Endorses significant depression history.  Denies being currently medicated for these symptoms.       DSM5 Diagnostic Considerations: Adjustment disorder with mixed anxiety and depressed mood        PLAN:  Records reviewed: Yes  Discussed patient with other provider: Norris  Signing off  Thank you for the consult.     Ghazal Lozoya, Ph.D

## 2023-04-10 NOTE — CARE PLAN
The patient is Stable - Low risk of patient condition declining or worsening    Shift Goals  Clinical Goals: safety  Patient Goals: pain management    Progress made toward(s) clinical / shift goals:      Problem: Knowledge Deficit - Standard  Goal: Patient and family/care givers will demonstrate understanding of plan of care, disease process/condition, diagnostic tests and medications  Outcome: Progressing  Patient c/o constipation. LBM was on 4/8. Suppository given this afternoon. Will continue to monitor.      Problem: Pain - Standard  Goal: Alleviation of pain or a reduction in pain to the patient’s comfort goal  Outcome: Progressing       Patient is not progressing towards the following goals:

## 2023-04-10 NOTE — DISCHARGE PLANNING
Cm - Discussed w/ Dr vega who indicates pt's spouse can only come in for family training on Wed 4/12; therefore - dc date has been changed to 4/12.

## 2023-04-11 NOTE — THERAPY
Physical Therapy   Daily Treatment     Patient Name: Richard Hubbard II  Age:  47 y.o., Sex:  male  Medical Record #: [id number]  Today's Date: 4/11/2023     Precautions  Precautions: (P) Fall Risk, Non Weight Bearing Left Lower Extremity, Weight Bearing As Tolerated Right Lower Extremity  Comments: (P) L BKA, RA - L shoulder and R wrist exacerbation. R and L wrist brace, L knee immobilizer donned in bed for ambulation and standing transfers; R foot wound with offloading shoe OOB    Subjective    Patient in w/c and agreeable to therapy.     Objective       04/11/23 1431   PT Charge Group   PT Therapeutic Activities (Units) 2   PT Total Time Spent   PT Individual Total Time Spent (Mins) 30   Precautions   Precautions Fall Risk;Non Weight Bearing Left Lower Extremity;Weight Bearing As Tolerated Right Lower Extremity   Comments L BKA, RA - L shoulder and R wrist exacerbation. R and L wrist brace, L knee immobilizer donned in bed for ambulation and standing transfers; R foot wound with offloading shoe OOB   Gait Functional Level of Assist    Gait Level Of Assist Contact Guard Assist   Assistive Device Front Wheel Walker   Distance (Feet) 8   # of Times Distance was Traveled 1   Deviation   (hops on RLE with variable step length)   Wheelchair Functional Level of Assist   Wheelchair Assist Modified Independent   Distance Wheelchair (Feet or Distance) 200 ftx2   Wheelchair Description Extra time   Transfer Functional Level of Assist   Bed, Chair, Wheelchair Transfer Supervised   Bed Chair Wheelchair Transfer Description Increased time;Supervision for safety  (lateral scoot vs. stand hop/pivot with FWW)   Bed Mobility    Sit to Stand Supervised   Interdisciplinary Plan of Care Collaboration   Patient Position at End of Therapy Seated;Self Releasing Lap Belt Applied;Call Light within Reach;Tray Table within Reach;Phone within Reach     Administered AMPnoPro; see scoring sheets below.              Assessment    Patient  tolerated session well, improving AMPnoPro score from 9/39 on 3/21/23 to 19/39 today. Educated on K levels and encouraged to continue gaining strength and balance to improve score. Patient on target for d/c.    Strengths: Able to follow instructions, Effective communication skills, Good insight into deficits/needs, Independent prior level of function  Barriers: Fatigue, Generalized weakness, Home accessibility, Impaired activity tolerance, Impaired balance, Pain, Pain poorly managed, Pressure ulcer    Plan    Anticipated d/c tomorrow to home with home health PT and intermittent assist; w/c and FWW. Family training tomorrow for car transfers and hopping with FWW.     Passport items to be completed:  completed    Physical Therapy Problems (Active)       Problem: PT-Long Term Goals       Dates: Start: 03/22/23         Goal: LTG-By discharge, patient will propel wheelchair household distances with Mariano.        Dates: Start: 03/22/23         Goal Note filed on 04/06/23 0820 by Lorraine Healy, PT       Em to SetupA for occasional help needed for leg rest management, cues for parts management (arm rests, brakes).              Goal: LTG-By discharge, patient will perform home exercise program independently.        Dates: Start: 03/22/23         Goal Note filed on 04/06/23 0820 by Lorraine Healy, PT       Progressing, Em for form and postural alignment.               Goal: LTG-By discharge, patient will transfer in/out of a car with supervision.        Dates: Start: 03/22/23         Goal Note filed on 04/06/23 0820 by Lorraine Healy PT       Needs assessment.               Goal: LTG-By discharge, patient will complete bed mobility with Mariano.        Dates: Start: 03/22/23         Goal Note filed on 04/06/23 0820 by Lorraine Healy PT       Needs assessment.              Goal: LTG-By discharge, patient will complete w/c to bed transfer using squat pivot vs slide board Mariano.        Dates: Start: 03/22/23

## 2023-04-11 NOTE — DISCHARGE SUMMARY
Physical Medicine & Rehabilitation Discharge Summary    Admission Date: 3/21/2023    Discharge Date: 4/12/2023     Attending Provider: Keaton Pisano MD/PhD    Admission Diagnosis:   Active Hospital Problems    Diagnosis     *S/P BKA (below knee amputation) unilateral, left (HCC)     Hypocalcemia     Sepsis, LLE tenosynovitis, GAS bacteremia (HCC)     Thrombocytopenia (HCC)     Ulcer of both feet (HCC)     Opioid dependence (HCC)     Chronic, continuous use of opioids     Rheumatoid arthritis (HCC)     MONICA (obstructive sleep apnea)        Discharge Diagnosis:  Active Hospital Problems    Diagnosis     *S/P BKA (below knee amputation) unilateral, left (HCC)     Hypocalcemia     Sepsis, LLE tenosynovitis, GAS bacteremia (HCC)     Thrombocytopenia (HCC)     Ulcer of both feet (HCC)     Opioid dependence (HCC)     Chronic, continuous use of opioids     Rheumatoid arthritis (HCC)     MONICA (obstructive sleep apnea)        HPI per Admission History & Physical:  The patient is a 47 y.o. right hand dominant male with a past medical history of reported arthritis on Humira with deformities and foot ulcers;  who presented on 2/28/2023 10:51 AM with MRI evidence of osteomyelitis of the left navicular bone fever of 103, hypoglycemia, altered mental status and cellulitis.  Patient was seen by orthopedic surgery, LPS and infectious disease.  He is currently on cefazolin 3 times daily through 3/12/2023.  Patient went marked left knee through foot yesterday which found tenosynovitis. Chart review reveals that Mr. Hubbard is a prior patient of [facility], with his most recent admission in July 2020, where he attended rehab following right knee replacement and was successfully discharged home after 11 days. Patient was taken to the OR on 3/9 for L BKA performed by Dr. Chambers. Blood cultures have been  + for strep A. Patient has been on IV abx, but will be switched to augementin.  Patient's hospital stay has  been complicated by difficult to control pain control and has been changed to long acting morphine, morphine IR PRN and Lyrica.    Patient was admitted to [facility] on 3/21/2023.     Hospital Course by Problem List:  L BKA - Patient with osteomyelitis while on DMARD for RA with multiple foot wounds. Patient is s/p left BKA on 3/9/23 with Dr. Chambers. NWB LLE. Needs IPOP vs extension brace.   -PT and OT for mobility and ADLs. Per guidelines, 15 hours per week between PT, OT and/or SLP.  -Follow-up Dr. Chambers. Continue sutures until follow-up with Dr. Chambers     RA - Previously on medication. Currently on hold.      Tobacco Abuse - Patient on nicotine patch. Counseled      Anemia - Check AM CBC - 9.0 on admission, will monitor. Recheck 2/27 with Fe panel. Fe panel - low Iron. Start supplement. Repeat 9.3 on 4/10/23. Follow-up PCP     Hyponatremia - Check AM CMP - 132, will continue to monitor. Recheck 2/27 - 137, improved     Hypocalcemia - Check AM CMP - 8.3 on admission     Morbid obesity due to excess calories - BMI of 35.3 on admission, meets medical criteria. Dietitian to consult     Neuropathy - Patient on Lyrica TID. Continue Lyrica at current dose. Worsening night time shooting pain. Increase to 150/150/200 mg and check AM CBC/CMP/Mag. Labs stable, improved night time pain control. Continue Lyrica 150-150-200. Recheck AM labs on 4/3/23. Stable Cr increase to 200 mg TID  -Continue Lyrica 200 mg TID. Add Gabapentin 300 mg. Continue Gabapentin. Check labs 4/10, follow-up about pain contract     Adjustment disorder with depression - Patient with adjustment disorder to new disability. Continue Cymbalta 60 mg     Pain - Patient on Morphine ER, Morphine IR PRN, Tylenol, and Lyrica  -Schedule Morphine , 1130, and 1500. Continue scheduled Morphine IR and ER as well as lyrica. Will recheck Cr 2/27 - stable Cr at 0.49. Continue Lyrica and Gabapentin  I discussed the risks and benefits  of using opiate medications for pain control.  I discussed the risk of addiction, potential for overdose, and respiratory depression (and the potential need for opiate antagonist therapy if this occurs).  I encouraged the patient to take this medication sparingly with the expressed goal of weaning off the medication as soon as is clinically appropriate.  I informed the patient that we are only able to provide up to a 14 day supply of these medications at discharge and that they will be responsible for requesting any refills needed from their primary care provider or their surgeon.  We discussed the need to safely secure these medications to prevent theft, inadvertent ingestion, or misuse.  Any unused medication should be immediately disposed of through a sanctioned medication disposal program.  We discussed adjunctive pain medications and conservative therapies at length.I answered the patient's questions regarding this treatment, and the patient indicated understanding and willingness to proceed.     Ear pain - No improvement after docusate and no notable wax in external canal as of 4/6/23. Will start Omnicef. Resolved.      Skin - Patient at risk for skin breakdown due to debility in areas including sacrum, achilles, elbows and head in addition to other sites. Nursing to assess skin daily.   -Rash on 3/28, start hydrocortisone.       GI Ppx - Patient on Prilosec for GERD prophylaxis. Patient on Senna-docusate for constipation prophylaxis.   -Abdominal discomfort 4/4/23, check KUB and start Simethicone       DVT Ppx - Patient refusing AC on transfer. Patient reports he is aware of risks.    Functional Status at Discharge  Eating:  Independent  Eating Description:     Grooming:  Modified Independent, Seated  Grooming Description:  Seated in wheelchair at sink  Bathing:  Modified Independent  Bathing Description:  Grab bar, Hand held shower, Tub bench, Increased time (Pt able to reach all parts while seated on fold  down shower bench, with lateral lean for rear. Pt completed all waterproofing )  Upper Body Dressing:  Independent  Upper Body Dressing Description:  Set-up of equipment (to don pullover shirt)  Lower Body Dressing:  Standby Assist  Lower Body Dressing Description:  Assistive devices, Supervision for safety, Verbal cueing, Increased time (SBA during hip hike with FWW for support. Ind for sock and shoe )     Walk:  Standby Assist  Distance Walked:  20  Number of Times Distance Was Traveled:  3 (forward and backwards in parallel bars)  Assistive Device:  Parallel Bars  Gait Deviation:   (hops on RLE with variable step length)  Wheelchair:  Modified Independent  Distance Propelled:  200   Wheelchair Description:  Extra time  Stairs Unable to Participate  Stairs Description       Comprehension:  Independent  Comprehension Description:     Expression:  Independent  Expression Description:     Social Interaction:     Social Interaction Description:     Problem Solving:  Contact Guard Assist  Problem Solving Description:     Memory:  Independent  Memory Description:          Keaton DREW M.D., personally performed a complete drug regimen review and no potential clinically significant medication issues were identified.   Discharge Medication:     Medication List        START taking these medications        Instructions   gabapentin 300 MG Caps  Commonly known as: NEURONTIN   Take 1 Capsule by mouth 3 times a day.  Dose: 300 mg            CHANGE how you take these medications        Instructions   * morphine 30 MG tablet  What changed:   medication strength  how much to take  reasons to take this  Commonly known as: MS IR   Take 1 Tablet by mouth every 6 hours as needed for Severe Pain for up to 14 days. Indications: Pain  Dose: 30 mg     * morphine ER 30 MG Tbcr tablet  What changed:   medication strength  how much to take  Commonly known as: MS CONTIN   Take 1 Tablet by mouth every 12 hours for 14  days.  Dose: 30 mg     Naloxone 4 MG/0.1ML Liqd  What changed:   how much to take  how to take this  when to take this  reasons to take this  Commonly known as: NARCAN   One spray in one nostril for overdose and call 911.     pregabalin 200 MG capsule  What changed:   medication strength  how much to take  Commonly known as: LYRICA   Take 1 Capsule by mouth 3 times a day for 14 days.  Dose: 200 mg           * This list has 2 medication(s) that are the same as other medications prescribed for you. Read the directions carefully, and ask your doctor or other care provider to review them with you.                CONTINUE taking these medications        Instructions   DULoxetine 60 MG Cpep delayed-release capsule  Commonly known as: CYMBALTA   Take 1 Capsule by mouth every day.  Dose: 60 mg     Humira Pen 40 MG/0.4ML Pnkt  Generic drug: Adalimumab   INJECT 40MG SUBCUTANEOUSLY  EVERY 2 WEEKS     methocarbamol 750 MG Tabs  Commonly known as: ROBAXIN   Take 1 Tablet by mouth 3 times a day as needed (spasm).  Dose: 750 mg     omeprazole 40 MG delayed-release capsule  Commonly known as: PRILOSEC   Take 1 Capsule by mouth every day.  Dose: 40 mg     vitamin D2 (Ergocalciferol) 1.25 MG (71441 UT) Caps capsule  Commonly known as: Drisdol   Take 1 Capsule by mouth every 7 days.  Dose: 50,000 Units            STOP taking these medications      betamethasone dipropionate 0.05 % Crea     carboxymethylcellulose 0.5 % Soln  Commonly known as: REFRESH TEARS     miconazole 2 % Crea  Commonly known as: MICOTIN     mirtazapine 7.5 MG tablet  Commonly known as: Remeron     polyethylene glycol/lytes 17 g Pack  Commonly known as: MIRALAX     senna-docusate 8.6-50 MG Tabs  Commonly known as: PERICOLACE or SENOKOT S     simethicone 125 MG chewable tablet  Commonly known as: Mylicon              Discharge Diet:  Current Diet Order   Procedures    Diet Order Diet: Regular       Discharge Activity:  As tolerated     Disposition:  Patient to  discharge home with family support and community resources.    Equipment:  FWW, WC    Follow-up & Discharge Instructions:  Follow up with your primary care provider (PCP) within 7-10 days of discharge to review your medications and take over your care.     If you develop chest pain, fever, chills, change in neurologic function (weakness, sensation changes, vision changes), or other concerning sxs, seek immediate medical attention or call 911.      Future Appointments   Date Time Provider Department Center   4/19/2023  1:30 PM Wayne Chambers M.D. [facility]       Condition on Discharge:  Good    More than 31 minutes was spent on discharging this patient, including face-to-face time, prescription management, and the dictation of this note.    Keaton Pisano M.D.    Date of Service: 4/12/2023

## 2023-04-11 NOTE — THERAPY
Occupational Therapy  Daily Treatment     Patient Name: Richard Hubbard II  Age:  47 y.o., Sex:  male  Medical Record #: [id number]  Today's Date: 4/11/2023     Precautions  Precautions: Fall Risk, Non Weight Bearing Left Lower Extremity, Weight Bearing As Tolerated Right Lower Extremity  Comments: L BKA, RA - L shoulder and R wrist exacerbation. R and L wrist brace, L knee immobilizer donned in bed for ambulation and standing transfers; R foot wound with offloading shoe OOB         Subjective    Pt seen 2x this day and agreeable to both sessions. See details below.     Objective       04/11/23 1031 04/11/23 1301   OT Charge Group   OT Sensory Integration Techniques (Units) 1  --    OT Therapy Activity (Units) 3 2   OT Total Time Spent   OT Individual Total Time Spent (Mins) 60 30   Functional Level of Assist   Toilet Transfers Minimal Assist Standby Assist   Toilet Transfer Description Adaptive equipment;Increased time;Set-up of equipment;Supervision for safety;Verbal cueing  (See note for details.) Adaptive equipment;Increased time;Supervision for safety  (SBA for stand hop transfer using FWW to padded drop arm commode.)   Tub / Shower Transfers Supervised  --    Tub Shower Transfer Description Shower bench;Increased time;Supervision for safety  (SPV-Mod I for 3x lateral scoot transfer w/c<>tub transfer bench over tub. Dry transfers.)  --    Interdisciplinary Plan of Care Collaboration   IDT Collaboration with  Physical Therapist    Patient Position at End of Therapy Seated;Tray Table within Reach;Call Light within Reach;Self Releasing Lap Belt Applied  --    Collaboration Comments re: CLOF, POC, DC planning re: DC planning and DME     Both sessions focused on DME and DC planning with functional toilet transfers.   1030 - Pt completed 6x functional transfers in simulated home environment with small wall were his cabinets would be, tub to anterior, and commode on L-side. Initial transfer was to a  standard armed commode over toilet set to 19 inches, which pt required Min A to stand from. Reset to 22 inches, however, during hoping to posterior and turning, pt his FWW onto leg of commode and had LOB that required Mod A for recovery. Pt was able to maintain precautions with no contact made with LLE and then completed transfer. Padded commode placed with pt then completing 2x safe SBA transfer with appropriate setup. Pt then attempted a lateral scoot to commode, able to complete, but was not able to manage clothing without standing at close SBA in 1/2 sit<>standing holding with one hand on arm rest of commode, unsafely. Continued recommendation for padded drop arm commode and FWW for functional transfers.    1300 - Upon therapist arrival, pt attempting to locate a 2 in 1 padded commode and tub transfer bench, as he is concerned that a standard drop arm commode will compromise his skin and will not allow for enough space to complete toileting based on seat hole size. OTR also attempting to locate on [employer] a suitable recommendation, however, due to lack of knowledge on toilet measurements at pt's home, unable to make such recommendation except 2 separate pieces of equipment. Referred pt to CM for follow up on equipment and possibility of CG resources prior to DC tomorrow. Recommending pt have at least intermittent assist and the recommended DME for safe DC.    Strengths: Able to follow instructions, Alert and oriented, Effective communication skills, Good insight into deficits/needs, Independent prior level of function, Motivated for self care and independence, Pleasant and cooperative, Supportive family, Willingly participates in therapeutic activities  Barriers: Decreased endurance, Fatigue, Generalized weakness, Home accessibility, Impaired activity tolerance, Impaired balance, Limited mobility, Pain, Pain poorly managed    Plan    Anticipated d/c tomorrow to home with HHOT and intermittent assist; w/c and FWW  for functional transfers. Family training tomorrow for toilet and shower transfers.    Occupational Therapy Goals (Active)       Problem: Dressing       Dates: Start: 04/06/23         Goal: STG-Within one week, patient will dress LB at SPV level using DME/AE PRN.       Dates: Start: 04/06/23               Problem: Functional Transfers       Dates: Start: 04/06/23         Goal: STG-Within one week, patient will transfer to toilet at SPV level using DME/AE PRN.       Dates: Start: 04/06/23               Problem: OT Long Term Goals       Dates: Start: 03/22/23         Goal: LTG-By discharge, patient will complete basic self care tasks at Min A-Mod I level using DME/AE PRN.       Dates: Start: 03/22/23            Goal: LTG-By discharge, patient will perform bathroom transfers at SBA-Mod I level using DME/AE PRN.       Dates: Start: 03/22/23            Goal: LTG-By discharge, patient will complete basic home management at Min A-Mod I level using DME/AE PRN.       Dates: Start: 03/22/23               Problem: Toileting       Dates: Start: 04/06/23         Goal: STG-Within one week, patient will complete toileting tasks at SPV level using DME/AE PRN.       Dates: Start: 04/06/23

## 2023-04-11 NOTE — PROGRESS NOTES
"  Physical Medicine & Rehabilitation Progress Note    Encounter Date: 4/11/2023    Chief Complaint: Decreased mobility, leg pain    Interval Events (Subjective):  Patient sitting up in room. He reports he is doing well. He now has an appointment in 2 weeks. Discussed will write 2 weeks of medications. Denies NVD. Discussed opiate counseling.     _____________________________________  Interdisciplinary Team Conference   Most recent IDT on 4/6/2023    Discharge Date/Disposition:  4/11/23 -> 4/12/23  _____________________________________      Objective:  VITAL SIGNS: /69   Pulse 78   Temp 36.6 °C (97.8 °F)   Resp 18   Ht 1.8 m (5' 10.87\")   Wt 86.6 kg (191 lb)   SpO2 94%   BMI 26.74 kg/m²   Gen: NAD  Psych: Mood and affect appropriate  CV: RRR, 0 edema  Resp: CTAB, no upper airway sounds  Abd: NTND  Neuro: AOx4, following commands    Laboratory Values:  Recent Results (from the past 72 hour(s))   CBC WITH DIFFERENTIAL    Collection Time: 04/10/23  5:18 AM   Result Value Ref Range    WBC 5.0 4.8 - 10.8 K/uL    RBC 3.24 (L) 4.70 - 6.10 M/uL    Hemoglobin 9.3 (L) 14.0 - 18.0 g/dL    Hematocrit 30.2 (L) 42.0 - 52.0 %    MCV 93.2 81.4 - 97.8 fL    MCH 28.7 27.0 - 33.0 pg    MCHC 30.8 (L) 33.7 - 35.3 g/dL    RDW 48.7 35.9 - 50.0 fL    Platelet Count 230 164 - 446 K/uL    MPV 10.2 9.0 - 12.9 fL    Neutrophils-Polys 37.80 (L) 44.00 - 72.00 %    Lymphocytes 45.90 (H) 22.00 - 41.00 %    Monocytes 10.70 0.00 - 13.40 %    Eosinophils 3.80 0.00 - 6.90 %    Basophils 1.40 0.00 - 1.80 %    Immature Granulocytes 0.40 0.00 - 0.90 %    Nucleated RBC 0.00 /100 WBC    Neutrophils (Absolute) 1.88 1.82 - 7.42 K/uL    Lymphs (Absolute) 2.28 1.00 - 4.80 K/uL    Monos (Absolute) 0.53 0.00 - 0.85 K/uL    Eos (Absolute) 0.19 0.00 - 0.51 K/uL    Baso (Absolute) 0.07 0.00 - 0.12 K/uL    Immature Granulocytes (abs) 0.02 0.00 - 0.11 K/uL    NRBC (Absolute) 0.00 K/uL   Basic Metabolic Panel    Collection Time: 04/10/23  5:18 AM   Result " Value Ref Range    Sodium 139 135 - 145 mmol/L    Potassium 4.1 3.6 - 5.5 mmol/L    Chloride 104 96 - 112 mmol/L    Co2 27 20 - 33 mmol/L    Glucose 79 65 - 99 mg/dL    Bun 9 8 - 22 mg/dL    Creatinine 0.64 0.50 - 1.40 mg/dL    Calcium 8.3 (L) 8.5 - 10.5 mg/dL    Anion Gap 8.0 7.0 - 16.0   MAGNESIUM    Collection Time: 04/10/23  5:18 AM   Result Value Ref Range    Magnesium 1.9 1.5 - 2.5 mg/dL   ESTIMATED GFR    Collection Time: 04/10/23  5:18 AM   Result Value Ref Range    GFR (CKD-EPI) 118 >60 mL/min/1.73 m 2         Medications:  Scheduled Medications   Medication Dose Frequency    cefdinir  300 mg Q12HRS    gabapentin  300 mg TID    simethicone  125 mg TID    pregabalin  200 mg TID    hydrocortisone   TID    nicotine  7 mg Daily-0600    multivitamin  1 Tablet DAILY    morphine  30 mg TID    senna-docusate  2 Tablet BID    acetaminophen  650 mg Q6HRS    calcium citrate  950 mg DAILY    DULoxetine  60 mg DAILY    morphine ER  30 mg Q12HRS    omeprazole  40 mg DAILY    thiamine  100 mg DAILY    vitamin D2 (Ergocalciferol)  50,000 Units Q7 DAYS     PRN medications: sodium chloride, capsaicin, Respiratory Therapy Consult, hydrALAZINE, acetaminophen, senna-docusate **AND** polyethylene glycol/lytes **AND** magnesium hydroxide **AND** bisacodyl, mag hydrox-al hydrox-simeth, ondansetron **OR** ondansetron, traZODone, sodium chloride, carboxymethylcellulose, methocarbamol, mirtazapine, morphine, zolpidem    Diet:  Current Diet Order   Procedures    Diet Order Diet: Regular       Medical Decision Making and Plan:   L BKA - Patient with osteomyelitis while on DMARD for RA with multiple foot wounds. Patient is s/p left BKA on 3/9/23 with Dr. Chambers. NWB LLE. Needs IPOP vs extension brace.   -PT and OT for mobility and ADLs. Per guidelines, 15 hours per week between PT, OT and/or SLP.  -Follow-up Dr. Chambers. Continue sutures until follow-up with Dr. Chambers     RA - Previously on medication. Currently on hold.       Tobacco Abuse - Patient on nicotine patch. Counseled      Anemia - Check AM CBC - 9.0 on admission, will monitor. Recheck 2/27 with Fe panel. Fe panel - low Iron. Start supplement. Repeat 9.3 on 4/10/23. Follow-up PCP     Hyponatremia - Check AM CMP - 132, will continue to monitor. Recheck 2/27 - 137, improved     Hypocalcemia - Check AM CMP - 8.3 on admission     Morbid obesity due to excess calories - BMI of 35.3 on admission, meets medical criteria. Dietitian to consult     Neuropathy - Patient on Lyrica TID. Continue Lyrica at current dose. Worsening night time shooting pain. Increase to 150/150/200 mg and check AM CBC/CMP/Mag. Labs stable, improved night time pain control. Continue Lyrica 150-150-200. Recheck AM labs on 4/3/23. Stable Cr increase to 200 mg TID  -Continue Lyrica 200 mg TID. Add Gabapentin 300 mg. Continue Gabapentin. Check labs 4/10, follow-up about pain contract    Adjustment disorder with depression - Patient with adjustment disorder to new disability. Continue Cymbalta 60 mg    Pain - Patient on Morphine ER, Morphine IR PRN, Tylenol, and Lyrica  -Schedule Morphine , 1130, and 1500. Continue scheduled Morphine IR and ER as well as lyrica. Will recheck Cr 2/27 - stable Cr at 0.49. Continue Lyrica and Gabapentin  I discussed the risks and benefits of using opiate medications for pain control.  I discussed the risk of addiction, potential for overdose, and respiratory depression (and the potential need for opiate antagonist therapy if this occurs).  I encouraged the patient to take this medication sparingly with the expressed goal of weaning off the medication as soon as is clinically appropriate.  I informed the patient that we are only able to provide up to a 14 day supply of these medications at discharge and that they will be responsible for requesting any refills needed from their primary care provider or their surgeon.  We discussed the need to safely secure these medications to  prevent theft, inadvertent ingestion, or misuse.  Any unused medication should be immediately disposed of through a sanctioned medication disposal program.  We discussed adjunctive pain medications and conservative therapies at length.I answered the patient's questions regarding this treatment, and the patient indicated understanding and willingness to proceed.    Ear pain - No improvement after docusate and no notable wax in external canal as of 4/6/23. Will start Omnicef. Resolved.      Skin - Patient at risk for skin breakdown due to debility in areas including sacrum, achilles, elbows and head in addition to other sites. Nursing to assess skin daily.   -Rash on 3/28, start hydrocortisone.      GI Ppx - Patient on Prilosec for GERD prophylaxis. Patient on Senna-docusate for constipation prophylaxis.   -Abdominal discomfort 4/4/23, check KUB and start Simethicone      DVT Ppx - Patient refusing AC on transfer. Patient reports he is aware of risks.  ____________________________________    T. Loi Pisano MD./PhD.  [facility] - Physical Medicine & Rehabilitation   [facility]   ____________________________________

## 2023-04-11 NOTE — DISCHARGE INSTRUCTIONS
[facility] NURSING DISCHARGE INSTRUCTIONS    Blood Pressure: 104/68  Weight: 86.6 kg (191 lb)  Nursing recommendations for Richard Salinas Stevie AMATO at time of discharge are as follows:  Client and Family Member verbalized understanding of all discharge instructions and prescriptions.     Review all your home medications and newly ordered medications with your doctor and/or pharmacist. Follow medication instructions as directed by your doctor and/or pharmacist.    Pain Management:   Discharge Pain Medication Instructions:  Comfort Goal: Sleep Comfortably  Notify your primary care provider if pain is unrelieved with these measures, if the pain is new, or increased in intensity.    Discharge Skin Characteristics: Warm, Dry  Discharge Skin Exam: Other (Comments) (see wound documentation)  Moisture Associated Skin Damage 03/08/23 Buttock;Groin;Sacrum (Active)   Wound Image   03/21/23 1255   NEXT Weekly Photo (Inpatient Only) 03/27/23 03/21/23 1431   Drainage Amount None 04/07/23 0735   Periwound Assessment Clean;Dry;Intact;Pink;Red 04/10/23 0900   IAD Cleansing Foam Cleanser/Washcloth 03/28/23 2000   Periwound Protectant Barrier Paste 04/10/23 0900   IAD Containment Device None 03/28/23 2000       Wound 03/25/22 Full Thickness Wound Foot Right --Right Plantar Mid Foot (Active)   Wound Image   04/06/23 2000   Site Assessment JI 04/11/23 2039   Periwound Assessment Dry 04/11/23 2039   Margins Undefined edges;Unattached edges 04/09/23 0830   Drainage Amount None 04/09/23 0830   Treatments Site care 04/09/23 0830   Wound Cleansing Approved Wound Cleanser 04/09/23 0830   Periwound Protectant Not Applicable 04/09/23 0830   Dressing Cleansing/Solutions 3% Betadine 04/11/23 1628   Dressing Options Silicone Adhesive Foam 04/10/23 0900   Dressing Changed Observed 04/11/23 2039   Dressing Change/Treatment Frequency Daily, and As Needed 04/11/23 2039   Non-staged Wound Description Full thickness 03/26/23  1249   Wound Odor None 03/26/23 1249   Exposed Structures None 03/26/23 1249       Wound 03/09/23 Incision Pretibial Proximal (Active)   Wound Image   03/21/23 1255   Site Assessment JI 04/11/23 2039   Periwound Assessment JI 04/11/23 0800   Margins Attached edges 04/09/23 0830   Closure Sutures;Secondary intention 04/09/23 0830   Drainage Amount None 04/09/23 0830   Drainage Description Serosanguineous 03/24/23 1653   Treatments Offloading;Site care 04/09/23 0830   Wound Cleansing Approved Wound Cleanser 04/09/23 0830   Periwound Protectant Not Applicable 04/09/23 0830   Dressing Cleansing/Solutions Not Applicable 03/29/23 1600   Dressing Options Adaptic;Other (Comments);Soft Conforming Roll 04/09/23 0830   Dressing Changed Observed 04/11/23 2039   Dressing Status Clean;Dry;Intact 04/11/23 2039   Dressing Change/Treatment Frequency Every 48 hrs, and As Needed 04/11/23 2039   NEXT Dressing Change/Treatment Date 04/13/23 04/11/23 1628   NEXT Weekly Photo (Inpatient Only) 04/03/23 03/29/23 1600     Skin / Wound Care Instructions: Please contact your primary care physician for any change in skin integrity.     If You Have Surgical Incisions / Wounds:  Monitor surgical site(s) for signs of increased swelling, redness or symptoms of drainage from the site or fever as this could indicate signs and symptoms of infection. If these symptoms are noted, notifiy your primary care provider.      Discharge Safety Instructions: Should Have ADULT SUPERVISION     Discharge Safety Concerns: Balance Problems (Dizziness, Light Headedness), Weakness  The interdisciplinary team has made recommendation that you should have adult supervision in the house due to balance problem, weakness, unsteady gait, and needing assistance with dressing changes  Anti-embolic stockings are not required to increase circulation to the lower extremities.    Discharge Diet: Regular     Discharge Liquids: Thin  Discharge Bowel Function: Continent  Please  "contact your primary care physician for any changes in bowel habits.  Discharge Bowel Program:    Discharge Bladder Function: Continent  Discharge Urinary Devices: None      Nursing Discharge Plan:   Influenza Vaccine Indication: Patient Refuses (pt immunocompromized)    Case Management Discharge Instructions:   Discharge Location:    Agency Name/Address/Phone:    Home Health:    Outpatient Services:    DME Provider/Phone:    Medical Equipment Ordered:    Prescription Faxed to:        Discharge Medication Instructions:  Below are the medications your physician expects you to take upon discharge:      Prevent Falls in Your Home    \"Falling once doubles your chance of falling again\"        -Center for Disease Control and Prevention    Falls in the home can lead to serious injury (fractures, brain injuries), hospitalizations, increased medical costs, and could even be fatal.  The good news is, there are many precautions you can take to avoid falls in your home and help keep you safe:     If prescribed an assistive device (walker, crutches), use as instructed by the healthcare provider\"   Remove any tripping hazards from your home, including loose cords, throw rugs and clutter  Keep a nightlight on in dark (hallways, bathrooms, etc)   Get up slowly, to make sure you feel okay before getting up  Be aware of any side effects of your medications: some medications may make you dizzy  Place a non-skid rubber mat in your shower or tub-consider a shower bench or chair if unsteady on your feet  Wear supportive shoes or non-skid socks when moving around  Start an exercise program once approved by your provider.  If you are feeling weak following a hospital stay, talk to your doctor about home health or outpatient therapy programs designed to help rebuild your strength and endurance              Physical Therapy Discharge Instructions for Richard Hubbard II    4/11/2023    Weight Bearing Status - Patient Should: Bear No " Weight Left Leg, Bear Weight as Tolerated Right Leg  Level of Assist Required for Ambulation: Assist for Balance on Flat Surfaces, Should Not Attempt Curbs at This Time, Should Not Attempt Stairs at This Time  Distance Patient May Ambulate: ~5-10 ft  Device Recommended for Ambulation: Front-Wheeled Walker  Level of Assist Required to Propel Wheelchair: Requires No Assist  Level of Assist Required for Transfers: Supervision  Device Recommended for Transfers: Front-Wheeled Walker (wheelchair)  Home Exercise Program: Refer to Home Exercise Program Handout for Details  Jeovanny, you are a  and you're going to be a K2 in no time! Keep in touch :) -Agata PT [email or web address]    Occupational Therapy Discharge Instructions for Richard Hubbard II    4/13/2023    Level of Assist Required for Eating: Able to Complete Eating without Assist  Level of Assist Required for Grooming: Able to Complete Grooming without Assist  Level of Assist Required for Dressing: Requires Supervision with Dressing  Level of Assist Required for Toileting: Requires Supervision with Toileting  Level of Assist Required for Toilet Transfer: Requires Supervision with Toilet Transfer  Equipment for Toilet Transfer: Commode at Bedside, Raised Toilet Seat with Arms  Level of Assist Required for Bathing: Requires Supervision with Bathing  Equipment for Bathing: Tub Transfer Bench, Hand Held Shower Head  Level of Assist Required for Shower Transfer: Requires Supervision with Shower Transfer  Equipment for Shower Transfer: Tub Transfer Bench  Level of Assist Required for Home Mgmt: Requires Physical Assist with Home Management  Level of Assist Required for Meal Prep: Requires Physical Assist with Meal Preparation  Driving: May not Drive, Please Contact Physician for Further Information  Home Exercise Program: Refer to Home Exercise Program Handout for Details

## 2023-04-11 NOTE — THERAPY
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 R and L wrist brace, L knee immobilizer donned in bed for ambulation and standing transfers; R foot wound with offloading shoe OOB    Subjective    Patient seated in w/c and agreeable to therapy.     Objective       04/11/23 0831   PT Charge Group   PT Gait Training (Units) 1   PT Therapeutic Exercise (Units) 1   PT Therapeutic Activities (Units) 2   PT Total Time Spent   PT Individual Total Time Spent (Mins) 60   Precautions   Precautions Fall Risk;Non Weight Bearing Left Lower Extremity;Weight Bearing As Tolerated Right Lower Extremity   Comments L BKA, RA - L shoulder and R wrist exacerbation. R and L wrist brace, L knee immobilizer donned in bed for ambulation and standing transfers; R foot wound with offloading shoe OOB   Gait Functional Level of Assist    Gait Level Of Assist Standby Assist   Assistive Device Parallel Bars   Distance (Feet) 20   # of Times Distance was Traveled 3  (forward and backwards in parallel bars)   Deviation   (hops on RLE with variable step length)   Wheelchair Functional Level of Assist   Wheelchair Assist Modified Independent   Distance Wheelchair (Feet or Distance) 200   Wheelchair Description Extra time   Transfer Functional Level of Assist   Bed, Chair, Wheelchair Transfer Supervised   Bed Chair Wheelchair Transfer Description Increased time;Supervision for safety  (lateral scoot vs. stand hop/pivot with FWW)   Bed Mobility    Supine to Sit Modified Independent   Sit to Supine Modified Independent   Sit to Stand Supervised   Scooting Modified Independent   Rolling Modified Independent   Interdisciplinary Plan of Care  Collaboration   IDT Collaboration with  Recreation Therapist   Patient Position at End of Therapy Seated;Call Light within Reach;Tray Table within Reach;Phone within Reach   Collaboration Comments handoff of care to rec therapist     Forward and backward hopping in parallel bars with SBA, cues to bring BUEs forward to simulate FWW use.    Reviewed and performed HEP, written handout provided: w/c pushups 1x10, bridge on bolster 1x10 and performing static bridge hold with alternating LE lifts 1x10, BLE 90/90 position for ab activation 2x30-60 seconds.    Assessment    Patient tolerated session well, focus of session on HEP and gait training. Demonstrating good understanding of exercises and recommendations.    Strengths: Able to follow instructions, Effective communication skills, Good insight into deficits/needs, Independent prior level of function  Barriers: Fatigue, Generalized weakness, Home accessibility, Impaired activity tolerance, Impaired balance, Pain, Pain poorly managed, Pressure ulcer    Plan    Anticipated d/c tomorrow to home with home health PT and intermittent assist; w/c and FWW.    Passport items to be completed:  completed    Physical Therapy Problems (Active)       Problem: PT-Long Term Goals       Dates: Start: 03/22/23         Goal: LTG-By discharge, patient will propel wheelchair household distances with Mariano.        Dates: Start: 03/22/23         Goal Note filed on 04/06/23 0820 by Lorraine Healy, PT       Em to [employer] for occasional help needed for leg rest management, cues for parts management (arm rests, brakes).              Goal: LTG-By discharge, patient will perform home exercise program independently.        Dates: Start: 03/22/23         Goal Note filed on 04/06/23 0820 by Lorraine Healy, PT       Progressing, Em for form and postural alignment.               Goal: LTG-By discharge, patient will transfer in/out of a car with supervision.        Dates: Start: 03/22/23         Goal Note  filed on 04/06/23 0820 by Lorraine Healy, PT       Needs assessment.               Goal: LTG-By discharge, patient will complete bed mobility with Mariano.        Dates: Start: 03/22/23         Goal Note filed on 04/06/23 0820 by Lorraine Healy, PT       Needs assessment.              Goal: LTG-By discharge, patient will complete w/c to bed transfer using squat pivot vs slide board Mariano.        Dates: Start: 03/22/23

## 2023-04-11 NOTE — THERAPY
Occupational Therapy  Daily Treatment     Patient Name: Richard Hubbard II  Age:  47 y.o., Sex:  male  Medical Record #: [id number]  Today's Date: 4/11/2023     Precautions  Precautions: Fall Risk, Non Weight Bearing Left Lower Extremity, Weight Bearing As Tolerated Right Lower Extremity  Comments: L BKA, RA - L shoulder and R wrist exacerbation. R and L wrist brace, L knee immobilizer donned in bed for ambulation and standing transfers; R foot wound with offloading shoe OOB         Subjective    Pt seated in w/c and agreeable to OT tx.     Objective       04/10/23 0831   OT Charge Group   OT Self Care / ADL (Units) 3   OT Therapy Activity (Units) 1   OT Total Time Spent   OT Individual Total Time Spent (Mins) 60   Cognition    Level of Consciousness Alert   Functional Level of Assist   Eating Independent   Grooming Modified Independent;Seated   Grooming Description Seated in wheelchair at sink   Bathing Modified Independent   Bathing Description Grab bar;Hand held shower;Tub bench;Increased time  (Pt able to reach all parts while seated on fold down shower bench, with lateral lean for rear. Pt completed all waterproofing )   Upper Body Dressing Independent   Lower Body Dressing Standby Assist   Lower Body Dressing Description Assistive devices;Supervision for safety;Verbal cueing;Increased time  (SBA during hip hike with FWW for support. Ind for sock and shoe )   Toileting Contact Guard Assist   Toileting Description Assist for standing balance;Grab bar;Adaptive equipment;Increased time;Supervision for safety  (CGA for post LOB)   Toilet Transfers Contact Guard Assist   Toilet Transfer Description Adaptive equipment;Increased time;Set-up of equipment;Supervision for safety;Verbal cueing  (CGA due to minimal LOB during squat pivot transfer. Completed 2x.)   Tub / Shower Transfers Contact Guard Assist   Tub Shower Transfer Description Adaptive equipment;Shower bench;Set-up of equipment;Verbal cueing;Supervision  "for safety  (CGA for w/c stabilization due to pitch of floor during lateral scoot transfer uphill on return from bench.)   Eating   Assistance Needed Independent   CARE Score - Eating 6   Oral Hygiene   Assistance Needed Adaptive equipment;Independent   CARE Score - Oral Hygiene 6   Toileting Hygiene   Assistance Needed Incidental touching   CARE Score - Toileting Hygiene 4   Shower/Bathe Self   Assistance Needed Independent;Adaptive equipment   CARE Score - Shower/Bathe Self 6   Upper Body Dressing   Assistance Needed Independent   CARE Score - Upper Body Dressing 6   Lower Body Dressing   Assistance Needed Supervision   CARE Score - Lower Body Dressing 4   Putting On/Taking Off Footwear   Assistance Needed Independent   CARE Score - Putting On/Taking Off Footwear 6   Toilet Transfer   Assistance Needed Incidental touching   CARE Score - Toilet Transfer 4   Cognitive Pattern Assessment   Cognitive Pattern Assessment Used BIMS   Brief Interview for Mental Status (BIMS)   Repetition of Three Words (First Attempt) 3   Temporal Orientation: Year Correct   Temporal Orientation: Month Accurate within 5 days   Temporal Orientation: Day Correct   Recall: \"Sock\" Yes, no cue required   Recall: \"Blue\" Yes, no cue required   Recall: \"Bed\" Yes, no cue required   BIMS Summary Score 15   Confusion Assessment Method (CAM)   Is there evidence of an acute change in mental status from the patient's baseline? No   Inattention Behavior not present   Disorganized thinking Behavior not present   Altered level of consciousness Behavior not present     Provided pt with handouts from [employer] for cushioned drop arm commode and tub transfer bench, as pt plans to order both prior to DC home. Also discussed pt contacting CM for community resources for home support with a possible paid CG to assist with ADLs, IADls, and .     Assessment    Pt tolerated OT session well with focus on IRF-ARTURO and noted continued need to address toilet " transfers, as pt will likely be alone with his children upon DC home on Wednesday.   Strengths: Able to follow instructions, Alert and oriented, Effective communication skills, Good insight into deficits/needs, Independent prior level of function, Motivated for self care and independence, Pleasant and cooperative, Supportive family, Willingly participates in therapeutic activities  Barriers: Decreased endurance, Fatigue, Generalized weakness, Home accessibility, Impaired activity tolerance, Impaired balance, Limited mobility, Pain, Pain poorly managed    Plan    BR transfers with FWW    Occupational Therapy Goals (Active)       Problem: Dressing       Dates: Start: 04/06/23         Goal: STG-Within one week, patient will dress LB at SPV level using DME/AE PRN.       Dates: Start: 04/06/23               Problem: Functional Transfers       Dates: Start: 04/06/23         Goal: STG-Within one week, patient will transfer to toilet at SPV level using DME/AE PRN.       Dates: Start: 04/06/23               Problem: OT Long Term Goals       Dates: Start: 03/22/23         Goal: LTG-By discharge, patient will complete basic self care tasks at Min A-Mod I level using DME/AE PRN.       Dates: Start: 03/22/23            Goal: LTG-By discharge, patient will perform bathroom transfers at SBA-Mod I level using DME/AE PRN.       Dates: Start: 03/22/23            Goal: LTG-By discharge, patient will complete basic home management at Min A-Mod I level using DME/AE PRN.       Dates: Start: 03/22/23               Problem: Toileting       Dates: Start: 04/06/23         Goal: STG-Within one week, patient will complete toileting tasks at SPV level using DME/AE PRN.       Dates: Start: 04/06/23

## 2023-04-11 NOTE — CARE PLAN
Problem: PT-Long Term Goals  Goal: LTG-By discharge, patient will propel wheelchair household distances with Mariano.   Outcome: Met  Goal: LTG-By discharge, patient will perform home exercise program independently.   Outcome: Met  Goal: LTG-By discharge, patient will complete bed mobility with Mariano.   Outcome: Met     Problem: PT-Long Term Goals  Goal: LTG-By discharge, patient will transfer in/out of a car with supervision.   Outcome: Discharged - Not Met  Goal: LTG-By discharge, patient will complete w/c to bed transfer using squat pivot vs slide board Mariano.   Outcome: Discharged - Not Met

## 2023-04-11 NOTE — DISCHARGE PLANNING
LINSEY spoke with Dipika at [facility] and she will see the patient on 4/26/23 at 12:45. Patient notified.

## 2023-04-11 NOTE — THERAPY
Recreational Therapy  Daily Treatment     Patient Name: Richard Hubbard II  AGE:  47 y.o., SEX:  male  Medical Record #: [id number]  Today's Date: 4/11/2023       Subjective    Patient was ready and agreeable to recreation therapy session.      Objective       04/11/23 0931   Procedural Tracking   Procedural Tracking Community Re-Integration;Community Skills Development;Leisure Skills Development;Leisure Skills Awareness;Social Skills Training;Cognitive Skills Training;Gross Motor Functional Leisure Skills;Fine Motor Functional Leisure Skills;Group Treatment   Treatment Time   Total Time Spent (mins) 30   Total Time Missed 0   Functional Ability Status - Cognitive   Attention Span Remains on Task   Comprehension Follows One Step Commands;Follows Two Step Commands   Judgment Able to Make Independent Decisions   Functional Ability Status - Emotional    Affect Appropriate   Mood Appropriate   Behavior Appropriate;Cooperative   Skilled Intervention    Skilled Intervention Gross Motor Leisure;Fine Motor Leisure;Cognitive Leisure   Interdisciplinary Plan of Care Collaboration   IDT Collaboration with  Physician   Patient Position at End of Therapy Seated;Phone within Reach;Tray Table within Reach;Call Light within Reach   Strengths & Barriers   Strengths Able to follow instructions;Alert and oriented;Willingly participates in therapeutic activities;Pleasant and cooperative;Motivated for self care and independence;Good insight into deficits/needs;Making steady progress towards goals   Barriers Decreased endurance;Fatigue;Generalized weakness;Impaired activity tolerance;Impaired balance;Impaired carryover of learning;Pain;Limited mobility         Assessment    Patient participated in previously learned game, Satya Inti Dharma, during recreation therapy session. Patent required mod cues for sequencing, strategy, and planning. Patient required min cues for processing speed. Patient had background music on throughout session. Patient  was mod I for fine and gross motor aspects of activity.     Strengths: (P) Able to follow instructions, Alert and oriented, Willingly participates in therapeutic activities, Pleasant and cooperative, Motivated for self care and independence, Good insight into deficits/needs, Making steady progress towards goals  Barriers: (P) Decreased endurance, Fatigue, Generalized weakness, Impaired activity tolerance, Impaired balance, Impaired carryover of learning, Pain, Limited mobility    Plan    Patient will benefit from continued recreation therapy sessions.     Passport items to be completed:  Verbalize two positive leisure activities, discuss returning to work, hobbies, community groups or volunteer activities, explore community resources

## 2023-04-11 NOTE — CARE PLAN
Problem: Pain - Standard  Goal: Alleviation of pain or a reduction in pain to the patient’s comfort goal  Outcome: Progressing   Medicated per MAR  Problem: Skin Integrity  Goal: Skin integrity is maintained or improved  Outcome: Progressing     Problem: Mobility  Goal: Patient's capacity to carry out activities will improve  Outcome: Progressing   The patient is Stable - Low risk of patient condition declining or worsening    Shift Goals  Clinical Goals: Safety, mobility  Patient Goals: pain control  Family Goals: NA    Progress made toward(s) clinical / shift goals:      Patient is not progressing towards the following goals:

## 2023-04-11 NOTE — CARE PLAN
"The patient is Stable - Low risk of patient condition declining or worsening    Shift Goals  Clinical Goals: safety  Patient Goals: pain management    Progress made toward(s) clinical / shift goals:      Problem: Bladder / Voiding  Goal: Patient will establish and maintain regular urinary output  Outcome: Progressing  Note: Patient is continent of bladder, voiding via urinal with staff emptying.       Patient is not progressing towards the following goals:    Problem: Pain - Standard  Goal: Alleviation of pain or a reduction in pain to the patient’s comfort goal  Outcome: Not Progressing  Note: Chronic high pain levels     Problem: Fall Risk - Rehab  Goal: Patient will remain free from falls  Outcome: Not Met  Note: Osiris Healy Fall risk Assessment Score: 13    Moderate fall risk Interventions  - Bed and strip alarm   - Yellow sign by the door   - Yellow wrist band \"Fall risk\"  - Fall risk education provided     "

## 2023-04-12 NOTE — PROGRESS NOTES
Shift report received, patient alert and oriented x 4, asleep in no acute distress. Will continue to monitor.

## 2023-04-12 NOTE — DISCHARGE PLANNING
Orders sent to [facility]. They called the patient and there is a co-pay so his wife asked that we send orders to [facility] in [place] outpatient [facility]. Orders sent and they will call him to discuss co-pay.

## 2023-04-12 NOTE — PROGRESS NOTES
Patient discharged to home per order.  Discharge instructions reviewed with patient Who verbalized understanding. Signed copies placed in chart.  Patient has all belongings packed. Patient left facility at 15:35 via wheel chair accompanied by rehab staff and wife.  Have enjoyed working with this pleasant patient.

## 2023-04-12 NOTE — DISCHARGE PLANNING
ATTN: Case Management  RE: Referral for Home Health    Reason for referral denial: we do not service [facility]              Unfortunately, we are not able to accept this referral for the reason listed above. If further clarity is needed, our Transitional Care Specialists are available to discuss any barriers to service at [phone].      We look forward to collaborating with you in the future,  [facility] Team

## 2023-04-12 NOTE — THERAPY
"Physical Therapy   Daily Treatment     Patient Name: Richard Hubbard II  Age:  47 y.o., Sex:  male  Medical Record #: [id number]  Today's Date: 4/12/2023     Precautions  Precautions: Fall Risk, Non Weight Bearing Left Lower Extremity, Weight Bearing As Tolerated Right Lower Extremity  Comments: L BKA, RA - L shoulder and R wrist exacerbation. R and L wrist brace, L knee immobilizer donned in bed for ambulation and standing transfers; R foot wound with offloading shoe OOB    Subjective    \"There's something wrong with the wheelchair they brought me.\" Pt c/ wife and OT present for family training, agreeable to car transfer c/ PT.      Objective       04/12/23 1331   PT Charge Group   PT Therapeutic Activities (Units) 2   PT Total Time Spent   PT Individual Total Time Spent (Mins) 30   Wheelchair Functional Level of Assist   Wheelchair Assist Modified Independent   Distance Wheelchair (Feet or Distance) 150   Wheelchair Description Extra time   Transfer Functional Level of Assist   Bed, Chair, Wheelchair Transfer Modified Independent  (Setup for car transfers)   Bed Chair Wheelchair Transfer Description Squat pivot transfer to wheelchair;Other (comment)  (or SPT c/ FWW)   Interdisciplinary Plan of Care Collaboration   IDT Collaboration with  Occupational Therapist;Therapy Tech   Patient Position at End of Therapy Seated;Family / Friend in Room   Collaboration Comments FT, DME/w/c needs, car transfers     Family training/TA: Full practice of car transfer c/ SPT using car door and grab handle in car x 3 reps, Mariano.  Reviewed w/c parts/management, setup/break down for car transfers, safety concerns with attempting to load w/c independently due to weight and risk of injury to UE or fall with lifting.   Therapy tech performed repairs to w/c including tightening L armrest, B wheel bearings.     Assessment    Risk c/ safe performance of car transfers <> w/c. Able to confirm understanding of safety concerns associated with " attempting to load w/c independently and drive at this time. His wife Gerardo able to demonstrate understanding of precautions and how to load w/c safely into vehicle.   Appropriate for d/c.     Strengths: Able to follow instructions, Effective communication skills, Good insight into deficits/needs, Independent prior level of function  Barriers: Fatigue, Generalized weakness, Home accessibility, Impaired activity tolerance, Impaired balance, Pain, Pain poorly managed, Pressure ulcer    Plan    D/C 04/12/23    Physical Therapy Problems (Active)       There are no active problems.

## 2023-04-12 NOTE — PROGRESS NOTES
Third attempt to change dressings of L stump and R foot. Patient stated that he knows how to do it and he will do it at home.

## 2023-04-12 NOTE — THERAPY
Occupational Therapy  Daily Treatment     Patient Name: Richard Hubbard II  Age:  47 y.o., Sex:  male  Medical Record #: [id number]  Today's Date: 4/12/2023     Precautions  Precautions: Fall Risk, Non Weight Bearing Left Lower Extremity, Weight Bearing As Tolerated Right Lower Extremity  Comments: L BKA, RA - L shoulder and R wrist exacerbation. R and L wrist brace, L knee immobilizer donned in bed for ambulation and standing transfers; R foot wound with offloading shoe OOB         Subjective    Pt seen 2x this day, as pt's wife arrived 15 min late for FT. Completed 15 more min from [id number] for shower transfer training.     Objective       04/12/23 1301   OT Charge Group   OT Therapy Activity (Units) 3   OT Total Time Spent   OT Individual Total Time Spent (Mins) 45   Functional Level of Assist   Bed, Chair, Wheelchair Transfer Supervised   Bed Chair Wheelchair Transfer Description Increased time;Supervision for safety  (lateral scoot transfer EOB>w/c)   Toilet Transfers Standby Assist   Toilet Transfer Description Increased time;Adaptive equipment;Supervision for safety  (SBA for stand hop transfer using FWW to padded drop arm commode in simulated home space.)   Tub / Shower Transfers Standby Assist  (to SPV)   Tub Shower Transfer Description Shower bench;Supervision for safety  (SBA-SPV for lateral scoot w/c<>tub transfer bench over tub. Dry transfer.)   Interdisciplinary Plan of Care Collaboration   IDT Collaboration with  Family / Caregiver;;Physical Therapist   Patient Position at End of Therapy Seated;Family / Friend in Room   Collaboration Comments re: FT, DME, and POC     DME - W/c, padded drop arm bedside commode had arrived in AM and were unpackaged and trialed by pt. W/c cushion was a foam, not gel that had been recommended, and several parts of w/c were loose and returned to  for exchange. See  notes for further details. Commode set to 21 inches, as per pt's w/c height for even  surface level transfers.    Family training completed with Spouse who was late for training by 15 min, but OTR came back at end of tx day to complete tub transfer training. Reviewed and educated re: CLOF with ADL's, functional transfers, DME recommendations, weight bearing precautions, home safety, and level of supervision. Pt completed toilet and tub transfers as detailed and listed above, with wife observing. Continued with recommendation that pt have intermittent assist at home for toilet transfers and all shower transfers. Pt is SPV-Mod I for lateral scoot w/c<>EOB and could complete this same transfer to bedside commode if needed at home, which both wife and pt acknowledged. OTR answered all questions. All family members reported that they feel comfortable and confident that they will be able to provide pt with intermittent assist upon DC home.    Strengths: Able to follow instructions, Alert and oriented, Effective communication skills, Good insight into deficits/needs, Independent prior level of function, Motivated for self care and independence, Pleasant and cooperative, Supportive family, Willingly participates in therapeutic activities  Barriers: Decreased endurance, Fatigue, Generalized weakness, Home accessibility, Impaired activity tolerance, Impaired balance, Limited mobility, Pain, Pain poorly managed    Plan    DC home with wife to provide intermittent assist, HHOT.    Passport items to be completed:  Completed    Occupational Therapy Goals (Active)       Problem: Dressing       Dates: Start: 04/06/23         Goal: STG-Within one week, patient will dress LB at SPV level using DME/AE PRN.       Dates: Start: 04/06/23               Problem: Functional Transfers       Dates: Start: 04/06/23         Goal: STG-Within one week, patient will transfer to toilet at SPV level using DME/AE PRN.       Dates: Start: 04/06/23               Problem: OT Long Term Goals       Dates: Start: 03/22/23         Goal:  LTG-By discharge, patient will complete basic self care tasks at Min A-Mod I level using DME/AE PRN.       Dates: Start: 03/22/23            Goal: LTG-By discharge, patient will perform bathroom transfers at SBA-Mod I level using DME/AE PRN.       Dates: Start: 03/22/23            Goal: LTG-By discharge, patient will complete basic home management at Min A-Mod I level using DME/AE PRN.       Dates: Start: 03/22/23               Problem: Toileting       Dates: Start: 04/06/23         Goal: STG-Within one week, patient will complete toileting tasks at SPV level using DME/AE PRN.       Dates: Start: 04/06/23

## 2023-04-12 NOTE — CARE PLAN
"The patient is Stable - Low risk of patient condition declining or worsening    Shift Goals  Clinical Goals: Safety, mobility  Patient Goals: pain control  Family Goals: NA    Patient is not progressing towards the following goals:    Problem: Pain - Standard  Goal: Alleviation of pain or a reduction in pain to the patient’s comfort goal  Outcome: Not Progressing  Note: Patient has high levels of pain persisting through PRN pain interventions     Problem: Bladder / Voiding  Goal: Patient will establish and maintain regular urinary output  Outcome: Not Progressing  Note: Patient spilled urinal this shift requiring total bed linen change    Problem: Fall Risk - Rehab  Goal: Patient will remain free from falls  Outcome: Not Met  Note: Osirsi Healy Fall risk Assessment Score: 13    Moderate fall risk Interventions  - Bed and strip alarm   - Yellow sign by the door   - Yellow wrist band \"Fall risk\"  - Fall risk education provided     "

## 2023-04-13 NOTE — DISCHARGE PLANNING
CM/ Update - Contact made with [facility] in [place] outpatient PT; they have accepted pt and have reached out to pt re scheduling and co pay; wc delivered w/ standard cushion -  GEL CUSHION denied. No further intervention

## 2023-04-16 NOTE — PROGRESS NOTES
Subjective:   Richard Hubbard II is a 47 y.o. male who presents today with   Chief Complaint   Patient presents with    Cough     X 1 days    Fever     X 1 day ago, pt states he started a fever and wheezing. Pt states he just got out a the hospital for amputation      Cough  This is a new problem. The current episode started today. The problem has been unchanged. The problem occurs constantly. The cough is Productive of sputum. Associated symptoms include a fever, hemoptysis (blood tinged mucous), nasal congestion, shortness of breath and wheezing. Pertinent negatives include no chest pain. He has tried nothing for the symptoms. The treatment provided no relief. There is no history of asthma.   Patient recently was in the hospital and was discharged from rehab facility 3 days ago after below-knee amputation.    PMH:  has a past medical history of ADD (attention deficit disorder), Alcohol abuse, Allergic rhinitis (5/21/2009), Anemia (09/2019), Anxiety (5/21/2009), Apnea, sleep, Arrhythmia, Back pain (5/21/2009), Bipolar disorder ([facility]), Bleeding ulcer (5/21/2009), Bleeding ulcer (5/21/2009), Bowel habit changes, Daytime sleepiness, Depression (5/21/2009), Eczema (5/21/2009), GERD (gastroesophageal reflux disease), Heart burn, Heart murmur, Hemorrhagic disorder ([facility]), History of bleeding ulcers (2/12/2015), Hypertension (05/03/2021), Insomnia (5/21/2009), Migraine (5/21/2009), Morning headache, Obesity, morbid ([facility]) (5/21/2009), Osteomyelitis ([facility]) (10/7/2019), Pain (09/2019), Pubic ramus fracture ([facility]) (9/28/2019), Rheumatoid arteritis ([facility]) (09/2019), Sleep apnea (5/21/2009), and Snoring.    He has no past medical history of Hyperlipidemia.  MEDS:   Current Outpatient Medications:     albuterol 108 (90 Base) MCG/ACT Aero Soln inhalation aerosol, Inhale 2 Puffs every 6 hours as needed for Shortness of Breath., Disp: 8.5 g, Rfl: 0    doxycycline (VIBRAMYCIN) 100 MG Tab, Take 1 Tablet by mouth 2 times a day for  "7 days., Disp: 14 Tablet, Rfl: 0    amoxicillin-clavulanate (AUGMENTIN) 875-125 MG Tab, Take 1 Tablet by mouth 2 times a day for 7 days., Disp: 14 Tablet, Rfl: 0    morphine ER (MS CONTIN) 30 MG Tab CR tablet, Take 1 Tablet by mouth every 12 hours for 14 days., Disp: 28 Tablet, Rfl: 0    DULoxetine (CYMBALTA) 60 MG Cap DR Particles delayed-release capsule, Take 1 Capsule by mouth every day., Disp: 30 Capsule, Rfl: 2    gabapentin (NEURONTIN) 300 MG Cap, Take 1 Capsule by mouth 3 times a day., Disp: 90 Capsule, Rfl: 2    methocarbamol (ROBAXIN) 750 MG Tab, Take 1 Tablet by mouth 3 times a day as needed (spasm)., Disp: 28 Tablet, Rfl: 0    morphine (MS IR) 30 MG tablet, Take 1 Tablet by mouth every 6 hours as needed for Severe Pain for up to 14 days. Indications: Pain, Disp: 56 Tablet, Rfl: 0    omeprazole (PRILOSEC) 40 MG delayed-release capsule, Take 1 Capsule by mouth every day., Disp: 30 Capsule, Rfl: 2    pregabalin (LYRICA) 200 MG capsule, Take 1 Capsule by mouth 3 times a day for 14 days., Disp: 42 Capsule, Rfl: 0    vitamin D2, Ergocalciferol, (DRISDOL) 1.25 MG (57365 UT) Cap capsule, Take 1 Capsule by mouth every 7 days., Disp: 5 Capsule, Rfl:     Adalimumab (HUMIRA PEN) 40 MG/0.4ML Pen-injector Kit, INJECT 40MG SUBCUTANEOUSLY  EVERY 2 WEEKS, Disp: 2 Each, Rfl: 4    Naloxone (NARCAN) 4 MG/0.1ML Liquid, One spray in one nostril for overdose and call 911. (Patient taking differently: Administer 1 Spray into affected nostril(S) as needed. One spray in one nostril for overdose and call 911.), Disp: 1 Package, Rfl: 0  ALLERGIES:   Allergies   Allergen Reactions    Benadryl [Altaryl]      \"I get agitated\"    Nsaids      Bleeding, \"I had a bleeding ulcer\"    Phenergan [Promethazine Hcl]      \"I get very agitated\"    Penicillins      \"Had some dizziness\"  Tolerated Unasyn 10/2019  Tolerated Zosyn 02/2023    Remeron [Mirtazapine] Unspecified     I had a hard time waking up, lacking mental focus.      SURGHX:   Past " "Surgical History:   Procedure Laterality Date    KNEE AMPUTATION BELOW Left 3/9/2023    Procedure: AMPUTATION, BELOW KNEE;  Surgeon: Wayne Chambers M.D.;  Location: SURGERY [facility];  Service: Orthopedics    PB TOTAL KNEE ARTHROPLASTY Right 7/22/2020    Procedure: ARTHROPLASTY, KNEE, TOTAL;  Surgeon: Temo Pires M.D.;  Location: [facility];  Service: Orthopedics    TENDON TRANSFER Right 1/22/2020    Procedure: RIGHT DISTAL ULNA RESECTION AND EXTENSOR TENDON TRANSFER;  Surgeon: Marine Rushing M.D.;  Location: SURGERY [facility];  Service: Orthopedics    OTHER ORTHOPEDIC SURGERY Right 2020    total right knee replacement    IRRIGATION & DEBRIDEMENT ORTHO Left 10/9/2019    Procedure: IRRIGATION AND DEBRIDEMENT, WOUND - PELVIC OSTEOMYELITIS;  Surgeon: You Olivares M.D.;  Location: [facility];  Service: Orthopedics    OTHER SURGICAL PROCEDURE  2019    osteomelitis of pelvis cleaned    GASTRIC BYPASS LAPAROSCOPIC  2000    Lucila en y    CHOLECYSTECTOMY  2000    OTHER SURGICAL PROCEDURE      repair of broken penis     SOCHX:  reports that he has been smoking cigarettes and cigars. He has been smoking an average of .25 packs per day. He has never used smokeless tobacco. He reports that he does not drink alcohol and does not use drugs.  FH: Reviewed with patient, not pertinent to this visit.     Review of Systems   Constitutional:  Positive for fever.   Respiratory:  Positive for cough, hemoptysis (blood tinged mucous), sputum production, shortness of breath and wheezing.    Cardiovascular:  Negative for chest pain and palpitations.      Objective:   /78   Pulse 77   Temp 36.2 °C (97.2 °F) (Temporal)   Resp 18   Ht 1.778 m (5' 10\")   Wt 86.6 kg (191 lb)   SpO2 94%   BMI 27.41 kg/m²   Physical Exam  Vitals and nursing note reviewed.   Constitutional:       General: He is not in acute distress.     Appearance: Normal appearance. He is well-developed. He is not " ill-appearing or toxic-appearing.   HENT:      Head: Normocephalic and atraumatic.      Right Ear: Hearing normal.      Left Ear: Hearing normal.      Nose: Congestion present.      Mouth/Throat:      Mouth: Mucous membranes are moist.   Cardiovascular:      Rate and Rhythm: Normal rate and regular rhythm.      Heart sounds: Normal heart sounds.   Pulmonary:      Effort: Pulmonary effort is normal.      Breath sounds: No stridor. Wheezing (mild) present. No rhonchi or rales.   Musculoskeletal:      Comments: Below knee amputation on left lower extremity.  No notable erythema or streaking from the area.   Skin:     General: Skin is warm and dry.   Neurological:      Mental Status: He is alert.      Coordination: Coordination normal.   Psychiatric:         Mood and Affect: Mood normal.   Using wheelchair for ambulation.    DX CHEST  FINDINGS:  Limited by positioning.  Extensive ill-defined alveolar opacities in the mid and lower LEFT lung.  RIGHT lung is clear.  No pleural fluid collection or pneumothorax.  No major bony abnormality is seen.     IMPRESSION:     1.  Multifocal pneumonia involving LEFT lung.  2.  No pleural fluid or pneumothorax.    Assessment/Plan:   Assessment    1. Pneumonia of left lung due to infectious organism, unspecified part of lung  - doxycycline (VIBRAMYCIN) 100 MG Tab; Take 1 Tablet by mouth 2 times a day for 7 days.  Dispense: 14 Tablet; Refill: 0  - Referral back to [facility]  - amoxicillin-clavulanate (AUGMENTIN) 875-125 MG Tab; Take 1 Tablet by mouth 2 times a day for 7 days.  Dispense: 14 Tablet; Refill: 0    2. Acute cough  - DX-CHEST-2 VIEWS; Future    3. Sinus congestion  - SARS-CoV-2 PCR (24 hour In-House): Collect NP swab in [facility]; Future    4. Wheezing  - albuterol 108 (90 Base) MCG/ACT Aero Soln inhalation aerosol; Inhale 2 Puffs every 6 hours as needed for Shortness of Breath.  Dispense: 8.5 g; Refill: 0  Symptoms and presentation are consistent with pneumonia and confirmed with  x-ray on exam today.  Discussed with patient that we would not be able to rule out a PE in urgent care setting and if his coughing up and clearing congestion out that has blood-tinged turns into full on blood or continues to persist I would recommend following up in the ER setting.  Symptoms and presentation appear to be consistent with developing sinus infection versus upper respiratory tract infection.  Recommend ruling out COVID and discussed with patient that I would not recommend antibiotics at this point but he would like to have some sent in in case his symptoms do not improve over the next couple of days.  I recommend trying over-the-counter remedies and we will also send an inhaler for patient's wheezing that he is experiencing.  Patient states he has tolerated amoxicillin before with no issues.    Differential diagnosis, natural history, supportive care, and indications for immediate follow-up discussed.   Patient given instructions and understanding of medications and treatment.    If not improving in 3-5 days, F/U with PCP or return to  if symptoms worsen.    Patient agreeable to plan.      Please note that this dictation was created using voice recognition software. I have made every reasonable attempt to correct obvious errors, but I expect that there are errors of grammar and possibly content that I did not discover before finalizing the note.    Tim Lovell PA-C

## 2023-05-31 PROBLEM — E83.51 HYPOCALCEMIA: Status: RESOLVED | Noted: 2023-01-01 | Resolved: 2023-01-01

## 2023-05-31 PROBLEM — G89.29 OTHER CHRONIC PAIN: Status: RESOLVED | Noted: 2021-01-18 | Resolved: 2023-01-01

## 2023-05-31 PROBLEM — R78.81 BACTEREMIA: Status: RESOLVED | Noted: 2023-01-01 | Resolved: 2023-01-01

## 2023-05-31 PROBLEM — D84.821 IMMUNOCOMPROMISED STATE DUE TO DRUG THERAPY (HCC): Status: RESOLVED | Noted: 2023-01-01 | Resolved: 2023-01-01

## 2023-05-31 PROBLEM — E87.8 ELECTROLYTE ABNORMALITY: Status: RESOLVED | Noted: 2023-01-01 | Resolved: 2023-01-01

## 2023-05-31 PROBLEM — R29.6 MULTIPLE FALLS: Status: RESOLVED | Noted: 2020-12-10 | Resolved: 2023-01-01

## 2023-05-31 PROBLEM — I10 ESSENTIAL HYPERTENSION: Chronic | Status: RESOLVED | Noted: 2019-09-13 | Resolved: 2023-01-01

## 2023-05-31 PROBLEM — Z79.899 IMMUNOCOMPROMISED STATE DUE TO DRUG THERAPY (HCC): Status: RESOLVED | Noted: 2023-01-01 | Resolved: 2023-01-01

## 2023-05-31 PROBLEM — Z86.16 PERSONAL HISTORY OF COVID-19: Status: RESOLVED | Noted: 2022-01-31 | Resolved: 2023-01-01

## 2023-05-31 PROBLEM — E87.1 HYPONATREMIA: Status: RESOLVED | Noted: 2023-01-01 | Resolved: 2023-01-01

## 2023-05-31 PROBLEM — Z86.79 HISTORY OF ATRIAL FIBRILLATION: Status: ACTIVE | Noted: 2020-07-23

## 2023-05-31 PROBLEM — Z87.891 HISTORY OF CIGARETTE SMOKING: Status: ACTIVE | Noted: 2020-07-21

## 2023-05-31 PROBLEM — Z96.651 S/P TOTAL KNEE ARTHROPLASTY, RIGHT: Status: RESOLVED | Noted: 2020-07-27 | Resolved: 2023-01-01

## 2023-05-31 PROBLEM — B37.2 YEAST DERMATITIS: Status: RESOLVED | Noted: 2023-01-01 | Resolved: 2023-01-01

## 2023-05-31 PROBLEM — D69.6 THROMBOCYTOPENIA (HCC): Status: RESOLVED | Noted: 2023-01-01 | Resolved: 2023-01-01

## 2023-05-31 PROBLEM — E66.9 OBESITY (BMI 30-39.9): Status: ACTIVE | Noted: 2023-01-01

## 2023-05-31 PROBLEM — K42.9 UMBILICAL HERNIA WITHOUT OBSTRUCTION AND WITHOUT GANGRENE: Status: ACTIVE | Noted: 2023-01-01

## 2023-05-31 PROBLEM — D64.9 NORMOCYTIC ANEMIA: Status: ACTIVE | Noted: 2023-01-01

## 2023-05-31 PROBLEM — A41.9 SEPSIS (HCC): Status: RESOLVED | Noted: 2023-01-01 | Resolved: 2023-01-01

## 2023-05-31 PROBLEM — E16.2 HYPOGLYCEMIA: Status: RESOLVED | Noted: 2023-01-01 | Resolved: 2023-01-01

## 2023-05-31 NOTE — PROGRESS NOTES
Subjective:     CC: Establish    HPI:    presents today with  REQUEST  - follow up CXR- 4/16/2023- multifocal pneumonia  Albuterol - prescribe 4/2023   Duloxetine 60 for pain   Robaxin 750mg TID PRN not currently using    - - - - - -    Problem   Obesity (Bmi 30-39.9)   Umbilical Hernia Without Obstruction and Without Gangrene    Chronic umbilical hernia, no fever, chill. Regular bowel movements taking stool softeners.        Normocytic Anemia    Chronic issue. multifactorial   low iron level 2023, no ferritin on file  Reticulocyte elevated  Reports on ferrous sulfate        Ulcer of Right Foot (Hcc)    History of bilateral foot wound, 2/2 nodules thought to be rheumatological vs structural. Previously referred to podiatry, however, he was referred back to orthopedics. Following currently following with [facility].   - s/p L BKA ( 2023)       Chronic Use of Opiate Drug for Therapeutic Purpose    Follows with [facility] pain        Localized Osteoporosis Without Current Pathological Fracture    Hx of osteoporosis noted on DEXA on left femur  2020  Prior treatment:    Prevention:    Risk factors:  Hx smoking  Dental status:    EGFR:   DEXA:   The mean bone mineral density for the lumbar spine is 0.946 g/cm2, which corresponds to a T score of -1.9 and a Z score of -2.9.     The proximal left femur has a mean bone mineral density of 0.627 g/cm2, with a T score of -3.0 and a Z score of -3.5.     The distal left forearm has a mean bone mineral density of 0.867 g/cm2, with a T score of -0.1 and a Z score of -1.2.    Next imaging (2):   Pretreatment evaluation: 25(OH)D, serum calcium, cr    Medication  Treatment started: NA  Treatment ended: NA      Conditions affecting treatment: negative for: difficulty swallowing, CKD stage 4 or greater, hypocalcemia. He is able to sit or stand after taking medicine and understand directions for taking medicine.         History of Osteomyelitis    History of nodules/ chronic ulcers of  "bilateral feet feet, thought to be related to rheumatological condition vs structural abnormality.s/p left BKA on 3/9/23 with Dr. Chambers       History of atrial fibrillation ( single episode 2019)    - Not on AC due to history of bleeding ulcer  - Single episode/ Hx of pAF after orthopedic procedure in 2019, s/p cardioversion        History of Cigarette Smoking    Quit 2/2023       Aortic Root Dilatation (Hcc)    2019 Echocardiogram Ascending aorta is borderline dilated with a diameter of 4.0 cm       Rheumatoid Arthritis (Hcc)    On Humira       Vicente (Obstructive Sleep Apnea)    Diagnosed around ~2010, previously intolerant of CPAP. Saw pulmonology 1/2021, did not follow through with sleep study for CPAP/BIPAP trial       Mood Disorder (Hcc)    Mood disorder unspecified   On duloxetine 60mg for chronic pain  Report hx of diagnose with bipolar.        Hypocalcemia (Resolved)   Bacteremia (Resolved)   Immunocompromised State Due to Drug Therapy (Hcc) (Resolved)   Yeast Dermatitis (Resolved)   Electrolyte Abnormality (Resolved)   Sepsis, LLE tenosynovitis, GAS bacteremia (HCC) (Resolved)   Hypoglycemia (Resolved)   Thrombocytopenia (Hcc) (Resolved)   Hyponatremia (Resolved)   Personal History of Covid-19 (Resolved)   Other Chronic Pain (Resolved)   Multiple Falls (Resolved)   S/P Total Knee Arthroplasty, Right (Resolved)   H/o  essential hypertension (Resolved)   Chronic, Continuous Use of Opioids (Resolved)       Health Maintenance:     ROS:  Review of Systems   Constitutional:  Negative for chills and fever.   Respiratory:  Positive for wheezing. Negative for shortness of breath.         Faint wheezing   Cardiovascular:  Negative for chest pain and palpitations.       Objective:     Exam:  /58 (BP Location: Right arm, Patient Position: Sitting, BP Cuff Size: Adult)   Pulse 86   Temp 35.9 °C (96.6 °F) (Temporal)   Ht 1.778 m (5' 10\")   Wt 95.3 kg (210 lb) Comment: pt reported  SpO2 95%   BMI 30.13 kg/m² "  Body mass index is 30.13 kg/m².    Physical Exam  Constitutional:       Appearance: Normal appearance.   Cardiovascular:      Rate and Rhythm: Normal rate and regular rhythm.      Heart sounds: Murmur heard.   Pulmonary:      Effort: Pulmonary effort is normal.      Breath sounds: Normal breath sounds.   Musculoskeletal:      Cervical back: Normal range of motion and neck supple.   Neurological:      Mental Status: He is alert.         Labs:     Assessment & Plan:     47 y.o. male with the following -     1. MONICA (obstructive sleep apnea)  Chronic, stable   Report intolerant of bipap/cpap  Last saw pulm 2021  Plan  Consider repeat sleep study for evaluation vs alternative therapy    2. Rheumatoid arthritis involving multiple sites with positive rheumatoid factor (HCC)  Chronic, stable  With associate foot wound  On humira  Plan  Follow with rheum    3. History of cigarette smoking  Quit 2/2023    4. History of atrial fibrillation  See hpi    5. Pneumonia due to infectious organism, unspecified laterality, unspecified part of lung  Acute, stable  Discussed utility of cxr to rack resolution of pna  Patient is asymptomatic  - DX-CHEST-2 VIEWS; Future    6. Mood disorder (HCC)  Chronic, stable     - Referral to Behavioral Health    7. Obesity (BMI 30-39.9)    - Patient identified as having weight management issue.  Appropriate orders and counseling given.    8. Primary insomnia  Chronic stable  Hx of primary insomnia, and trouble falling asleep  - zolpidem (AMBIEN) 5 MG Tab; Take 1 Tablet by mouth at bedtime as needed for Sleep for up to 15 days.  Dispense: 15 Tablet; Refill: 0    9. Umbilical hernia without obstruction and without gangrene  Chronic stable  Request referral to   - Referral to General Surgery    10. Normocytic anemia  Chronic, stable  On iron supplement  Plan  Repeat cbc in 1 month  - CBC WITHOUT DIFFERENTIAL; Future    11. Aortic root dilatation (HCC)  Chronic, stable  Will order echocardiography      12. Localized osteoporosis without current pathological fracture  Chronic stable  Will discuss in next visit    13. History of osteomyelitis  See hpi    14. Chronic use of opiate drug for therapeutic purpose  Follows with [facility]    15. Ulcer of right foot, unspecified ulcer stage (HCC)  Chronic, stable  Follows with wound care             Return in about 4 weeks (around 6/28/2023) for Lab review, chronic disease .    Please note that this dictation was created using voice recognition software. I have made every reasonable attempt to correct obvious errors, but I expect that there are errors of grammar and possibly content that I did not discover before finalizing the note.

## 2023-08-12 PROBLEM — I50.9 HEART FAILURE (HCC): Status: ACTIVE | Noted: 2023-01-01

## 2023-08-12 PROBLEM — R79.89 ELEVATED LFTS: Status: ACTIVE | Noted: 2023-01-01

## 2023-08-12 PROBLEM — I05.8 MITRAL VALVE MASS: Status: ACTIVE | Noted: 2023-01-01

## 2023-08-12 PROBLEM — E87.70 FLUID OVERLOAD: Status: ACTIVE | Noted: 2023-01-01

## 2023-08-12 PROBLEM — N17.9 AKI (ACUTE KIDNEY INJURY) (HCC): Status: ACTIVE | Noted: 2023-01-01

## 2023-08-12 PROBLEM — I13.10 CARDIORENAL SYNDROME: Status: ACTIVE | Noted: 2023-01-01

## 2023-08-12 PROBLEM — I44.7 LEFT BUNDLE BRANCH BLOCK: Status: ACTIVE | Noted: 2023-01-01

## 2023-08-12 NOTE — ED TRIAGE NOTES
"Chief Complaint   Patient presents with    Weakness     Present for past week; pt reports normally ambulates w/ prosthetic leg \"I don't think I can walk around right now with how I feel\"    Shortness of Breath     Present past few days; pt spouse reports recent hospitalization mid July that showed \"fluid on lungs\"     BP (!) 82/41   Pulse 73   Temp 36 °C (96.8 °F) (Temporal)   Resp 20   Wt 95.3 kg (210 lb 1.6 oz)   SpO2 100%   BMI 30.15 kg/m²     Pt BIB spouse for above concern, pt fatigued in appearance in triage; emesis bag provided for sputum - Pt appears pale and juandice    Extensive PMH      "

## 2023-08-12 NOTE — ED NOTES
Pharmacy Medication Reconciliation      ~Medication reconciliation updated and complete per patient at bedside   ~Allergies have been verified and updated   ~No oral ABX within the last 30 days  ~Patient home pharmacy :  [facility]

## 2023-08-12 NOTE — H&P
"Pulmonary History & Physical Note    Date of Service  8/12/2023    Primary Care Physician  Johnathan Dover M.D.        Code Status  Full Code    Chief Complaint  Chief Complaint   Patient presents with    Weakness     Present for past week; pt reports normally ambulates w/ prosthetic leg \"I don't think I can walk around right now with how I feel\"    Shortness of Breath     Present past few days; pt spouse reports recent hospitalization mid July that showed \"fluid on lungs\"       History of Presenting Illness  Richard Hubbard II is a 48 y.o. male who presented 8/12/2023 with worsening SOB  Hx of RA on humira known previous hx of mild AI 2019 , hx of paroxysmal afib s/p cardioversion in 2019, not anticoagulated, BKA on the left now with severe   Was at [facility] 7/16/2023 with cellulitis of the right leg and an echo was done at that time showed severe AR, moderate AS and mild to moderate MR.  On ant leaflet on the Mitral is calcified mass 3 cm.    CXR c/w pulm edema   , creatinine 2.66, lfts elevated, pro BNP 45892  EKG pt now has LBBB which is new    I discussed the plan of care with   D/w DR. Ivey from cardiology the ECHO findings of 3 weeks ago and pt status, recommends no need to transfer to regional at this time   Optimize cardiac status with diuresis and if worsens to transfer to regional for further evaluation by cardiology    In ER he received 300 cc of fluid, vanc and zosyn    Pt being admitted to [facility] ICU       Review of Systems  Review of Systems   Constitutional:  Positive for malaise/fatigue.   HENT: Negative.     Eyes: Negative.    Respiratory:  Positive for shortness of breath.    Cardiovascular:  Positive for leg swelling.   Gastrointestinal: Negative.    Genitourinary: Negative.    Musculoskeletal: Negative.    Skin: Negative.    Neurological:  Positive for weakness.   Endo/Heme/Allergies: Negative.    Psychiatric/Behavioral: Negative.         Past Medical " "History   has a past medical history of ADD (attention deficit disorder), Alcohol abuse, Allergic rhinitis (5/21/2009), Anemia (09/2019), Anxiety (5/21/2009), Apnea, sleep, Arrhythmia, Back pain (5/21/2009), Bipolar disorder ([facility]), Bleeding ulcer (5/21/2009), Bleeding ulcer (5/21/2009), Bowel habit changes, Daytime sleepiness, Depression (5/21/2009), Eczema (5/21/2009), GERD (gastroesophageal reflux disease), Heart burn, Heart murmur, Hemorrhagic disorder ([facility]), History of bleeding ulcers (2/12/2015), Hypertension (05/03/2021), Insomnia (5/21/2009), Migraine (5/21/2009), Morning headache, Obesity, morbid (HCC) (5/21/2009), Osteomyelitis ([facility]) (10/7/2019), Pain (09/2019), Pubic ramus fracture ([facility]) (9/28/2019), Rheumatoid arteritis ([facility]) (09/2019), Sleep apnea (5/21/2009), and Snoring.    Surgical History   has a past surgical history that includes gastric bypass laparoscopic (2000); irrigation & debridement ortho (Left, 10/9/2019); other surgical procedure; other surgical procedure (2019); pr total knee arthroplasty (Right, 7/22/2020); cholecystectomy (2000); tendon transfer (Right, 1/22/2020); other orthopedic surgery (Right, 2020); and knee amputation below (Left, 3/9/2023).     Family History  family history includes Arthritis in his mother; Cancer in his father and maternal grandmother; Depression in his mother; Heart Disease in his maternal grandmother; Hypertension in his mother; Psychiatric Illness in an other family member; Schizophrenia in his father; Stroke in his maternal aunt.   Family history reviewed with patient. There is no family history that is pertinent to the chief complaint.     Social History   reports that he has been smoking cigarettes and cigars. He has been smoking an average of .25 packs per day. He has never used smokeless tobacco. He reports that he does not drink alcohol and does not use drugs.    Allergies  Allergies   Allergen Reactions    Nsaids      Bleeding, \"I had a bleeding " "ulcer\"    Diphenhydramine Unspecified     \"I get agitated\"    Mirtazapine Unspecified     I had a hard time waking up, lacking mental focus.     Penicillins      \"Had some dizziness\"  Tolerated Unasyn 10/2019  Tolerated Zosyn 02/2023    Promethazine Unspecified     \"I get very agitated\"       Medications  Prior to Admission Medications   Prescriptions Last Dose Informant Patient Reported? Taking?   Adalimumab (HUMIRA PEN) 40 MG/0.4ML Pen-injector Kit 8/10/2023 at [facility] Patient No No   Sig: INJECT 40MG SUBCUTANEOUSLY  EVERY 2 WEEKS   CALCIUM PO [facility] Patient Yes Yes   Sig: Take 1 Tablet by mouth every day.   DULoxetine (CYMBALTA) 60 MG Cap DR Particles delayed-release capsule 8/9/2023 at [facility] Patient No No   Sig: Take 1 Capsule by mouth every day.   Naloxone (NARCAN) 4 MG/0.1ML Liquid PRN at PRN Patient No No   Sig: One spray in one nostril for overdose and call 911.   VITAMIN D PO [facility] Patient Yes Yes   Sig: Take 1 Tablet by mouth every day.   albuterol 108 (90 Base) MCG/ACT Aero Soln inhalation aerosol PRN at PRN Patient No No   Sig: Inhale 2 Puffs every 6 hours as needed for Shortness of Breath.   morphine (MS IR) 30 MG tablet 8/11/2023 at [facility] Patient Yes No   Sig: Take 30 mg by mouth 4 times a day.   morphine ER (MS CONTIN) 15 MG Tab CR tablet 8/11/2023 at [facility] Patient Yes No   Sig: Take 15 mg by mouth 2 times a day.   pregabalin (LYRICA) 200 MG capsule 8/9/2023 at [facility] Patient Yes No   Sig: Take 200 mg by mouth every day.      Facility-Administered Medications: None       Physical Exam  Temp:  [36 °C (96.8 °F)] 36 °C (96.8 °F)  Pulse:  [73-75] 74  Resp:  [18-30] 20  BP: (82-95)/(41-51) 86/46  SpO2:  [91 %-100 %] 92 %  Blood Pressure: 95/49   Temperature: 36 °C (96.8 °F)   Pulse: 74   Respiration: (!) 24   Pulse Oximetry: 93 %       Physical Exam  Constitutional:       General: He is in acute distress.      Appearance: He is ill-appearing and toxic-appearing.   HENT:      Head: Normocephalic.      " Mouth/Throat:      Mouth: Mucous membranes are dry.   Eyes:      Extraocular Movements: Extraocular movements intact.      Pupils: Pupils are equal, round, and reactive to light.   Neck:      Comments: + JVD 4 cm  Cardiovascular:      Rate and Rhythm: Normal rate.      Heart sounds: Murmur heard.      Comments: Murmur loudest LUSB diastolic  Pulmonary:      Comments: Crackles throughout lung fields  Abdominal:      General: Bowel sounds are normal.   Musculoskeletal:      Right lower leg: Edema present.      Left lower leg: Edema present.      Comments: Bka on left  3+ edema up to thigh   Neurological:      Mental Status: He is oriented to person, place, and time.   Psychiatric:         Mood and Affect: Mood normal.         Behavior: Behavior normal.         Thought Content: Thought content normal.         Judgment: Judgment normal.      Comments: Anxious           Laboratory:  Recent Labs     08/12/23  1348   WBC 11.2*   RBC 3.67*   HEMOGLOBIN 10.5*   HEMATOCRIT 32.1*   MCV 87.5   MCH 28.6   MCHC 32.7   RDW 46.3   PLATELETCT 265   MPV 10.8     Recent Labs     08/12/23  1348   SODIUM 120*   POTASSIUM 5.4   CHLORIDE 87*   CO2 18*   GLUCOSE 111*   BUN 34*   CREATININE 2.66*   CALCIUM 8.5     Recent Labs     08/12/23  1348   ALTSGPT 242*   ASTSGOT 694*   ALKPHOSPHAT 311*   TBILIRUBIN 2.8*   GLUCOSE 111*         Recent Labs     08/12/23  1348   NTPROBNP 01482*         Recent Labs     08/12/23  1348   TROPONINT 34*       Imaging:  DX-CHEST-PORTABLE (1 VIEW)   Final Result      1.  Diffuse mixed interstitial and airspace opacities with enlarged cardiac silhouette could be related to edema, however multifocal pneumonia is not excluded.      EC-ECHOCARDIOGRAM LTD W/O CONT    (Results Pending)       ECHO done at [facility] 7/2023   LVEF range is estimated at 50 % - 55 %.     Moderately dilated right ventricle.     RVSP(estimated pulmonary artery systolic pressure): 39 mmHg.     The left atrium is severely dilated.      The right atrium is moderately to markedly dilated.     Mild - moderate mitral regurgitation.     No mitral valve stenosis.     There is a  calcified mass, measuring 2.2 cm by 2.3 cm on the base of the anterior leaflet, cannot rule out a small   vegetation on this structre   AV Vmax, Caliper: 3.97 m/s.     Mild-moderate tricuspid regurgitation.     The aortic root exhibits moderate dilatation.     The inferior vena cava is dilated.     Severe aortic regurgitation is present. There is moderate aortic stenosis. Aortic leaflets exhibit calcification. AV Vmax,   Caliper: 3.97 m/s. AV PGmax: 60 mmHg. AV PGmean: 42 mmHg. LAURA D (continuity eq. Vmax): 0.8 cm2. LAURA D   (continuity eq. VTI): 0.9 cm2.       In 2019 his AI was mild    Assessment/Plan:  Justification for Admission Status  I anticipate this patient will require at least two midnights for appropriate medical management, necessitating inpatient admission because severe decompensated AR    Patient will need a ICU (Adult and Pediatrics) bed on MEDICAL service .  The need is secondary to pressors and lasix.    * Fluid overload- (present on admission)  Assessment & Plan  His aortic regur is now severe compared to 2019 when he was mild  EF 3 weeks ago 53%  Now with LBBB  CPAP for support if needed  [place]  Lasix Bid   Pressor support  Meets criteria for valvular surgery based on symptoms and severity of AI - will defer to cardiology  BP too long for ACE  Will trend troponins  Repeat ECHO ordered as urgent    KATERYNA (acute kidney injury) (HCC)- (present on admission)  Assessment & Plan  Based on clinical exam and chart review, likely due to cardiorenal disease  MAP 65 and systolic 100   Use levophed    Left bundle branch block  Assessment & Plan  New onset likely due to worsening AI    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)  Assessment & Plan  Seen on ECHO at [place] 7/2023  Calcified on ant leaflet - 2.2 cm by 2.3 cm on the base of the anterior leaflet,   Reassess with  ECHO  Assume may need a MIA but will defer to cardiology to assess  Check esr as a few months ago was normal want to ensure we are not concerned for endocarditis    Elevated LFTs- (present on admission)  Assessment & Plan  Due to congestion secondary to cardiac cause  monitor    Cardiorenal syndrome- (present on admission)  Assessment & Plan  Due to decompensated AI  Lasix bid   Blood pressure support to improve flow    Hyponatremia- (present on admission)  Assessment & Plan  Hypervolemia hyponatremia  Will check urine lytes  Monitor closely   Mental status is intact    Rheumatoid arthritis (HCC)- (present on admission)  Assessment & Plan  Known hx on humira        VTE prophylaxis: heparin ppx    CC time managing decompensated cardiac valvular disease is 42 mins and this does not include procedure time

## 2023-08-12 NOTE — ASSESSMENT & PLAN NOTE
His aortic regur was mild ECHO 7/16/2023 severe ECHO 8/12 moderate  Diastolic heart failure   EF 55%  LBBB  Symptoms improved with diuresis  Lasix Bid   Pressor support  Meets criteria for valvular surgery based on symptoms and severity of AI - will defer to cardiology  BP too long for ACE  C/p jaw pain today  Will trend troponins

## 2023-08-12 NOTE — ASSESSMENT & PLAN NOTE
Based on clinical exam and chart review, likely due to cardiorenal disease  MAP 65 and systolic 100   Has not needed levophed

## 2023-08-12 NOTE — ED PROVIDER NOTES
"ED Provider Note    CHIEF COMPLAINT  Chief Complaint   Patient presents with    Weakness     Present for past week; pt reports normally ambulates w/ prosthetic leg \"I don't think I can walk around right now with how I feel\"    Shortness of Breath     Present past few days; pt spouse reports recent hospitalization mid July that showed \"fluid on lungs\"     EXTERNAL RECORDS REVIEWED  Patient just had an admission last month at [facility] in [place] due to right foot infection.  He had an MRI that was negative for osteomyelitis or abscess was treated with antibiotics.  He has a history of aortic stenosis, rheumatoid arthritis on Humira, left foot osteomyelitis status post BKA in March 2023, chronic pain.  He had a history of pulmonary edema at that time and TTE showing EF of 50 to 55% had a calcified mass on the base of the anterior leaflet and was unable to rule out small vegetations however the patient was not bacteremic       HPI/ROS  LIMITATION TO HISTORY   Select: : None  OUTSIDE HISTORIAN(S):  Significant other wife at bedside    Richard Hubbard II is a 48 y.o. male with complicated medical history who presents to the Emergency Department with weakness and shortness of breath.  Patient states that this has developed about over the past week.  He reports increasing shortness of breath over the last few days.  He has had some swelling in his legs however states its not as bad as it has been in the past.  No known fevers although he has had a cough.  Patient denies any chest pain or abdominal pain.  Patient was recently admitted to [facility] in [place] for foot infection of an ulcer on the right foot.  Wife says this has been healing well.  He does have a known history of aortic stenosis and atrial fibrillation however has not followed up with cardiology in some time.    PAST MEDICAL HISTORY  Past Medical History:   Diagnosis Date    ADD (attention deficit disorder)     Alcohol abuse     " Allergic rhinitis 5/21/2009    Anemia 09/2019    Anxiety 5/21/2009    Apnea, sleep     Arrhythmia     afib when hospitaized for infection    Back pain 5/21/2009    Bipolar disorder (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) 5/21/2009    Osteomyelitis ([facility]) 10/7/2019    Pain 09/2019    Chronic knee and back pain    Pubic ramus fracture ([facility]) 9/28/2019    Rheumatoid arteritis ([facility]) 09/2019    knee, feet right hand    Sleep apnea 5/21/2009    Did not tolerate cpap, does not use it    Snoring         SURGICAL HISTORY  Past Surgical History:   Procedure Laterality Date    KNEE AMPUTATION BELOW Left 3/9/2023    Procedure: AMPUTATION, BELOW KNEE;  Surgeon: Wayne Chambers M.D.;  Location: [facility];  Service: Orthopedics    PB TOTAL KNEE ARTHROPLASTY Right 7/22/2020    Procedure: ARTHROPLASTY, KNEE, TOTAL;  Surgeon: Temo Pires M.D.;  Location: [facility];  Service: Orthopedics    TENDON TRANSFER Right 1/22/2020    Procedure: RIGHT DISTAL ULNA RESECTION AND EXTENSOR TENDON TRANSFER;  Surgeon: Marine Rushing M.D.;  Location: [facility];  Service: Orthopedics    OTHER ORTHOPEDIC SURGERY Right 2020    total right knee replacement    IRRIGATION & DEBRIDEMENT ORTHO Left 10/9/2019    Procedure: IRRIGATION AND DEBRIDEMENT, WOUND - PELVIC OSTEOMYELITIS;  Surgeon: You Olivares M.D.;  Location: [facility];  Service:  Orthopedics    OTHER SURGICAL PROCEDURE  2019    osteomelitis of pelvis cleaned    GASTRIC BYPASS LAPAROSCOPIC  2000    Lucila en y    CHOLECYSTECTOMY  2000    OTHER SURGICAL PROCEDURE      repair of broken penis        FAMILY HISTORY  Family History   Problem Relation Age of Onset    Hypertension Mother     Arthritis Mother     Depression Mother     Cancer Father         bladder    Schizophrenia Father     Cancer Maternal Grandmother         breast    Heart Disease Maternal Grandmother     Psychiatric Illness Other     Stroke Maternal Aunt     Other Neg Hx         no connective tissue disorders or known aortic disease       SOCIAL HISTORY   reports that he has been smoking cigarettes and cigars. He has been smoking an average of .25 packs per day. He has never used smokeless tobacco. He reports that he does not drink alcohol and does not use drugs.    CURRENT MEDICATIONS  Current Discharge Medication List        CONTINUE these medications which have NOT CHANGED    Details   CALCIUM PO Take 1 Tablet by mouth every day.      VITAMIN D PO Take 1 Tablet by mouth every day.      Adalimumab (HUMIRA PEN) 40 MG/0.4ML Pen-injector Kit INJECT 40MG SUBCUTANEOUSLY  EVERY 2 WEEKS  Qty: 2 Each, Refills: 4      pregabalin (LYRICA) 200 MG capsule Take 200 mg by mouth every day.      morphine ER (MS CONTIN) 15 MG Tab CR tablet Take 15 mg by mouth 2 times a day.      morphine (MS IR) 30 MG tablet Take 30 mg by mouth 4 times a day.      albuterol 108 (90 Base) MCG/ACT Aero Soln inhalation aerosol Inhale 2 Puffs every 6 hours as needed for Shortness of Breath.  Qty: 8.5 g, Refills: 0    Associated Diagnoses: Wheezing      DULoxetine (CYMBALTA) 60 MG Cap DR Particles delayed-release capsule Take 1 Capsule by mouth every day.  Qty: 30 Capsule, Refills: 2    Associated Diagnoses: Tenosynovitis of left lower leg      Naloxone (NARCAN) 4 MG/0.1ML Liquid One spray in one nostril for overdose and call 911.  Qty: 1 Package, Refills: 0     "Associated Diagnoses: Rheumatoid arthritis involving multiple sites, unspecified rheumatoid factor presence; Chronic knee pain, unspecified laterality; Arthritis of knee; Closed fracture of ramus of left pubis, initial encounter ([facility]); Chronic pain of right knee; Other acute osteomyelitis, unspecified site ([facility])             ALLERGIES  Benadryl [altaryl], Nsaids, Phenergan [promethazine hcl], Penicillins, and Remeron [mirtazapine]    PHYSICAL EXAM  BP (!) 86/46   Pulse 74   Temp 36 °C (96.8 °F) (Temporal)   Resp 20   Ht 1.778 m (5' 10\")   Wt 95.3 kg (210 lb 1.6 oz)   SpO2 92%      Constitutional: Ill appearing. Alert in mild distress.  HENT: Normocephalic, Atraumatic. Bilateral external ears normal. Nose normal.  Moist mucous membranes.  Oropharynx clear.  Eyes: Pupils are equal and reactive. Conjunctiva normal.   Neck: Supple, full range of motion  Heart: Regular rate and rhythm.  Loud murmur noted.  Lungs: Mild tachypnea and increased work of breathing, diminished breath sounds at bases.  Abdomen Soft, no distention.  No tenderness to palpation.  Musculoskeletal: Atraumatic. No obvious deformities noted.  Prior left BKA, moderate lower extremity edema noted.  Skin: Warm, Dry.  No erythema, No rash.  Pale  Neurologic: Alert and oriented x3. Moving all extremities spontaneously without focal deficits.  Psychiatric: Affect normal, Mood normal, Appears appropriate and not intoxicated.      DIAGNOSTIC STUDIES / PROCEDURES    EKG  I have independently interpreted this EKG  Results for orders placed or performed during the hospital encounter of 23   EKG   Result Value Ref Range    Report       [facility] Emergency Dept.    Test Date:  2023  Pt Name:    LESLIE BADILLO             Department: [facility]  MRN:        [id number]                      [facility]  Gender:     Male                         Technician: PHILIP  :        1975                   Requested By:ER TRIAGE " PROTOCOL  Order #:    [id number]                    Reading MD: Angelita Alvarado MD    Measurements  Intervals                                Axis  Rate:       74                           P:          -17  OH:         283                          QRS:        -54  QRSD:       180                          T:          88  QT:         457  QTc:        507    Interpretive Statements  Sinus rhythm  Prolonged OH interval  Left bundle branch block  No STEMI  Compared to ECG 02/28/2023 15:34:02  No significant change from prior  Electronically Signed On 08- 14:04:57 PDT by Angelita Alvarado MD       *Note: Due to a large number of results and/or encounters for the requested time period, some results have not been displayed. A complete set of results can be found in Results Review.       LABS  Labs Reviewed   CBC WITH DIFFERENTIAL - Abnormal; Notable for the following components:       Result Value    WBC 11.2 (*)     RBC 3.67 (*)     Hemoglobin 10.5 (*)     Hematocrit 32.1 (*)     Neutrophils-Polys 75.00 (*)     Lymphocytes 17.00 (*)     Neutrophils (Absolute) 8.43 (*)     All other components within normal limits    Narrative:     Biotin intake of greater than 5 mg per day may interfere with  troponin levels, causing false low values.   COMP METABOLIC PANEL - Abnormal; Notable for the following components:    Sodium 120 (*)     Chloride 87 (*)     Co2 18 (*)     Glucose 111 (*)     Bun 34 (*)     Creatinine 2.66 (*)     AST(SGOT) 694 (*)     ALT(SGPT) 242 (*)     Alkaline Phosphatase 311 (*)     Total Bilirubin 2.8 (*)     Globulin 4.5 (*)     All other components within normal limits    Narrative:     Biotin intake of greater than 5 mg per day may interfere with  troponin levels, causing false low values.   LACTIC ACID - Abnormal; Notable for the following components:    Lactic Acid 3.2 (*)     All other components within normal limits    Narrative:     Biotin intake of greater than 5 mg per day may interfere  "with  troponin levels, causing false low values.   TROPONIN - Abnormal; Notable for the following components:    Troponin T 34 (*)     All other components within normal limits    Narrative:     Biotin intake of greater than 5 mg per day may interfere with  troponin levels, causing false low values.   PROBRAIN NATRIURETIC PEPTIDE, NT - Abnormal; Notable for the following components:    NT-proBNP 60417 (*)     All other components within normal limits    Narrative:     Biotin intake of greater than 5 mg per day may interfere with  troponin levels, causing false low values.   CRP QUANTITIVE (NON-CARDIAC) - Abnormal; Notable for the following components:    Stat C-Reactive Protein 10.37 (*)     All other components within normal limits    Narrative:     Biotin intake of greater than 5 mg per day may interfere with  troponin levels, causing false low values.   PROCALCITONIN - Abnormal; Notable for the following components:    Procalcitonin 2.55 (*)     All other components within normal limits    Narrative:     Biotin intake of greater than 5 mg per day may interfere with  troponin levels, causing false low values.   ESTIMATED GFR - Abnormal; Notable for the following components:    GFR (CKD-EPI) 29 (*)     All other components within normal limits    Narrative:     Biotin intake of greater than 5 mg per day may interfere with  troponin levels, causing false low values.   POCT GLUCOSE DEVICE RESULTS - Abnormal; Notable for the following components:    POC Glucose, Blood 108 (*)     All other components within normal limits   COV-2, FLU A/B, AND RSV BY PCR (CEPHEID)   LACTIC ACID   LACTIC ACID   BLOOD CULTURE    Narrative:     1 of 2 for Blood Culture x 2 sites order. Per Hospital  Policy: Only change Specimen Src: to \"Line\" if specified by  physician order.   BLOOD CULTURE    Narrative:     2 of 2 blood culture x2  Sites order. Per Hospital Policy:  Only change Specimen Src: to \"Line\" if specified by physician  order. "   URINALYSIS,CULTURE IF INDICATED   MAGNESIUM   SED RATE   URINE SODIUM RANDOM   URINE CREATININE RANDOM   OSMOLALITY URINE         RADIOLOGY  I have independently interpreted the diagnostic imaging associated with this visit and am waiting the final reading from the radiologist.   My preliminary interpretation is a follows: severe pulmonary edema vs multifocal pneumonia  Radiologist interpretation:   DX-CHEST-PORTABLE (1 VIEW)   Final Result      1.  Diffuse mixed interstitial and airspace opacities with enlarged cardiac silhouette could be related to edema, however multifocal pneumonia is not excluded.      EC-ECHOCARDIOGRAM LTD W/O CONT    (Results Pending)         COURSE & MEDICAL DECISION MAKING    ED Observation Status? No; Patient does not meet criteria for ED Observation.     INITIAL ASSESSMENT, COURSE AND PLAN  Care Narrative: Patient with history of rheumatoid arthritis on Humira, recurrent osteomyelitis, aortic stenosis who presents with increasing weakness and shortness of breath over the last few days.  He is afebrile with borderline low blood pressures on arrival.  He has no hypoxia however does have some mild tachypnea and increased work of breathing.  His EKG shows a left bundle branch block which appears old without concern for new ischemia or arrhythmia.  His chest x-ray shows significant pulmonary edema versus multifocal pneumonia.  He has multiple lab abnormalities including signs of acutely decompensated heart failure with an elevated BNP and a mildly elevated troponin.  No symptoms really concerning for ACS.  He also has an elevated lactate of 3.2 and mild leukocytosis.  He has elevation of inflammatory markers and procalcitonin therefore he was covered with broad-spectrum antibiotics in case of sepsis from pneumonia.  He has acute renal failure and severe hyponatremia.  He also has significant transaminitis.  Patient was given a small bolus of fluids due to borderline blood pressures and  concern for sepsis as well as severe hyponatremia.    I discussed the case with Dr. Cook, intensivist on-call who accepts admission of the patient.  She did discuss the case with Dr. Ivey with cardiology due to his significant and decompensated heart failure and valve issues.  Cardiology feels he can be diuresed and does not need urgent surgical evaluation.  Will start diuresis and likely need addition of pressor support.    3:30 PM - Upon reassessment, patient is resting comfortably with stable vital signs.  No new complaints at this time.  Discussed results with patient and/or family as well a plan of care for admission to the intensive care unit.  Patient and wife are agreeable at this time.      ADDITIONAL PROBLEM LIST  Problem #1: Acute congestive heart failure -likely due to to valve dysfunction, discussed with intensivist with plan for stat echo, will start diuresis and likely pressor support    Problem #2: Sepsis due to pneumonia -covered with broad spectrum antibiotics in case of infection, given small fluid bolus    Problem #3: Acute kidney injury likely related to above, continue to monitor -     Problem #4: Hyponatremia -given small fluid bolus, continue to monitor    Problem #5: Transaminitis -  likely related to sepsis or vascular congestion from heart failure, continue to monitor    DISPOSITION AND DISCUSSIONS  I have discussed management of the patient with the following physicians and ANNIE's:    Dr. Cook    CRITICAL CARE  The very real possibilty of a deterioration of this patient's condition required the highest level of my preparedness for sudden, emergent intervention.  I provided critical care services, which included medication orders, frequent reevaluations of the patient's condition and response to treatment, ordering and reviewing test results, and discussing the case with various consultants.  The critical care time associated with the care of the patient was 36 minutes. Review chart for  interventions. This time is exclusive of any other billable procedures.       DISPOSITION:  Patient will be hospitalized by Dr. Cook in critical condition.    FINAL DIAGNOSIS  1. Acute congestive heart failure, unspecified heart failure type (HCC)    2. Community acquired pneumonia, unspecified laterality    3. Sepsis with acute renal failure without septic shock, due to unspecified organism, unspecified acute renal failure type (HCC)    4. KATERYNA (acute kidney injury) ([facility])    5. Hyponatremia    6. Transaminitis

## 2023-08-12 NOTE — ASSESSMENT & PLAN NOTE
Seen on ECHO at [place] 7/2023 but current ECHO says probable atrial mass  Calcified on ant leaflet - 2.2 cm by 2.3 cm on the base of the anterior leaflet on 7/2023 current echo possible atrial mass  Assume may need a MIA but will defer to cardiology to assess  ESR is 40 somewhat reassuring that this is not a vegetation

## 2023-08-13 PROBLEM — G89.4 CHRONIC PAIN SYNDROME: Status: ACTIVE | Noted: 2021-01-18

## 2023-08-13 PROBLEM — I50.31 ACUTE DIASTOLIC HEART FAILURE (HCC): Status: ACTIVE | Noted: 2023-01-01

## 2023-08-13 NOTE — CARE PLAN
The patient is Stable - Low risk of patient condition declining or worsening    Shift Goals  Clinical Goals: pain control, trend trops, 1.5L fluid restriction  Patient Goals: JI  Family Goals: JI    Progress made toward(s) clinical / shift goals:  yes    Patient is not progressing towards the following goals:      Problem: Knowledge Deficit - Standard  Goal: Patient and family/care givers will demonstrate understanding of plan of care, disease process/condition, diagnostic tests and medications  Outcome: Progressing     Problem: Fall Risk  Goal: Patient will remain free from falls  Outcome: Progressing     Problem: Skin Integrity  Goal: Skin integrity is maintained or improved  Outcome: Progressing     Problem: Pain - Standard  Goal: Alleviation of pain or a reduction in pain to the patient’s comfort goal  Outcome: Progressing

## 2023-08-13 NOTE — PROGRESS NOTES
4 Eyes Skin Assessment Completed by Antonia RN and Horacio RN.    Head Jaundice   Ears Jaundice  Nose Jaundice  Mouth WDL  Neck Jaundice  Breast/Chest WDL  Shoulder Blades WDL  Spine WDL  (R) Arm/Elbow/Hand WDL  (L) Arm/Elbow/Hand WDL  Abdomen WDL  Groin Redness, Excoriation, and Rash  Scrotum/Coccyx/Buttocks Redness and Blanching  (R) Leg Edema  (L) Leg Edema  (R) Heel/Foot/Toe Edema  (L) Heel/Foot/Toe   BKA          Devices In Places ECG, Blood Pressure Cuff, Pulse Ox, and Oxy Mask      Interventions In Place InterDry, TAP System, Pillows, Q2 Turns, and Pressure Redistribution Mattress    Possible Skin Injury Yes right foot ulcer    Pictures Uploaded Into Epic Yes  Wound Consult Placed Yes  RN Wound Prevention Protocol Ordered Yes

## 2023-08-13 NOTE — PROGRESS NOTES
4 Eyes Skin Assessment Completed by Ramon RN and FAITH Ruby.    Head WDL  Ears WDL  Nose WDL  Mouth WDL  Neck WDL  Breast/Chest WDL  Shoulder Blades Redness and Blanching  Spine Redness and Blanching  (R) Arm/Elbow/Hand WDL  (L) Arm/Elbow/Hand WDL  Abdomen Redness and Non-Blanching  Groin Redness and Blanching  Scrotum/Coccyx/Buttocks Redness and Blanching  (R) Leg Swelling and Edema  (L) Leg Swelling and Edema  (R) Heel/Foot/Toe Redness, Blanching, Swelling, and Edema Ulcer  (L) Heel/Foot/Toe Below Knee amputation          Devices In Places Blood Pressure Cuff, Pulse Ox, and Nasal Cannula      Interventions In Place Gray Ear Foams, InterDry, Heel Mepilex, Sacral Mepilex, Q2 Turns, Low Air Loss Mattress, and Barrier Cream    Possible Skin Injury Yes    Pictures Uploaded Into Epic Yes  Wound Consult Placed N/A  RN Wound Prevention Protocol Ordered Yes

## 2023-08-13 NOTE — CARE PLAN
Problem: Knowledge Deficit - Standard  Goal: Patient and family/care givers will demonstrate understanding of plan of care, disease process/condition, diagnostic tests and medications  Outcome: Progressing     Problem: Fall Risk  Goal: Patient will remain free from falls  Outcome: Progressing     Problem: Skin Integrity  Goal: Skin integrity is maintained or improved  Outcome: Progressing     Problem: Pain - Standard  Goal: Alleviation of pain or a reduction in pain to the patient’s comfort goal  Outcome: Progressing   The patient is Watcher - Medium risk of patient condition declining or worsening    Shift Goals  Clinical Goals: monitor I&Os    Progress made toward(s) clinical / shift goals:    Problem: Knowledge Deficit - Standard  Goal: Patient and family/care givers will demonstrate understanding of plan of care, disease process/condition, diagnostic tests and medications  Outcome: Progressing     Problem: Fall Risk  Goal: Patient will remain free from falls  Outcome: Progressing     Problem: Skin Integrity  Goal: Skin integrity is maintained or improved  Outcome: Progressing     Problem: Pain - Standard  Goal: Alleviation of pain or a reduction in pain to the patient’s comfort goal  Outcome: Progressing       Patient is not progressing towards the following goals:

## 2023-08-13 NOTE — PROGRESS NOTES
"Pulmonary Progress Note    Date of admission  8/12/2023    Chief Complaint  48 y.o. male admitted 8/12/2023 with SOB    Hospital Course  Richard Hubbard II is a 48 y.o. 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  On ant leaflet on the Mitral is calcified mass 3 cm.     CXR c/w pulm edema   , creatinine 2.66, lfts elevated, pro BNP 31091  EKG pt now has LBBB which is new     I discussed the plan of care with   D/w DR. Ivey from cardiology the ECHO findings of 3 weeks ago and pt status, recommends no need to transfer to regional at this time   Optimize cardiac status with diuresis and if worsens to transfer to regional for further evaluation by cardiology     In ER he received 300 cc of fluid, vanc and zosyn     Pt being admitted to [facility] ICU     8/13: ECHO yesterday is different readings than the [facility] ECHO  \"Normal left ventricular chamber size.  The left ventricular ejection fraction is visually estimated to be 55%.  Normal right ventricular size and systolic function.  Moderate mitral regurgitation.  Moderate aortic valve stenosis.  Vmax is3.6  m/s.  Moderate aortic insufficiency.  Estimated right ventricular systolic pressure is 50 mmHg.\"    AI is quantified as moderate now  Mitral calcified mass is read as possible atrial mass  Diastolic heart failure Grade I  Do think pt weill need a MIA  Consult rounding cardiologist on Monday  Pt feels better with diuresis    Creatine unchanged at 2.68, -1000    Interval Problem Update  Reviewed last 24 hour events:  As above    Review of Systems  Review of Systems   Constitutional:  Positive for malaise/fatigue.   HENT: Negative.   "        Right jaw pain   Eyes: Negative.    Respiratory:  Positive for shortness of breath.    Cardiovascular: Negative.    Gastrointestinal:  Positive for nausea and vomiting.   Genitourinary: Negative.    Musculoskeletal: Negative.    Skin: Negative.    Neurological: Negative.    Endo/Heme/Allergies: Negative.    Psychiatric/Behavioral: Negative.          Vital Signs for last 24 hours   Temp:  [36 °C (96.8 °F)-36.9 °C (98.4 °F)] 36.7 °C (98.1 °F)  Pulse:  [73-81] 77  Resp:  [16-38] 19  BP: ()/(41-64) 106/61  SpO2:  [90 %-100 %] 95 %        Physical Exam   Physical Exam  Constitutional:       Appearance: He is ill-appearing.   HENT:      Head: Normocephalic and atraumatic.      Mouth/Throat:      Mouth: Mucous membranes are dry.   Eyes:      Extraocular Movements: Extraocular movements intact.      Pupils: Pupils are equal, round, and reactive to light.   Cardiovascular:      Rate and Rhythm: Normal rate.      Heart sounds: Murmur heard.   Pulmonary:      Effort: Pulmonary effort is normal.      Comments: crackles  Abdominal:      General: Bowel sounds are normal.      Palpations: Abdomen is soft.   Musculoskeletal:      Cervical back: Normal range of motion.      Right lower leg: Edema present.      Left lower leg: Edema present.      Comments: Left BKA   Skin:     General: Skin is warm and dry.   Neurological:      General: No focal deficit present.      Mental Status: He is alert and oriented to person, place, and time.   Psychiatric:         Mood and Affect: Mood normal.         Behavior: Behavior normal.         Thought Content: Thought content normal.         Judgment: Judgment normal.         Medications  Current Facility-Administered Medications   Medication Dose Route Frequency Provider Last Rate Last Admin    senna-docusate (Pericolace Or Senokot S) 8.6-50 MG per tablet 2 Tablet  2 Tablet Oral BID Sampson Menezes M.D.   2 Tablet at 08/12/23 1705    And    polyethylene glycol/lytes (Miralax)  PACKET 1 Packet  1 Packet Oral QDAY WING Menezes M.D.   1 Packet at 08/12/23 2212    And    magnesium hydroxide (Milk Of Magnesia) suspension 30 mL  30 mL Oral QDAY WING Menezes M.D.        And    bisacodyl (Dulcolax) suppository 10 mg  10 mg Rectal QDAY PRSTEPHEN Menezes M.D.        heparin injection 5,000 Units  5,000 Units Subcutaneous Q8HRS Sampson Menezes M.D.   5,000 Units at 08/13/23 0554    furosemide (Lasix) injection 40 mg  40 mg Intravenous BID DIURETIC Sampson Menezes M.D.   40 mg at 08/13/23 0554    morphine ER (Ms Contin) tablet 15 mg  15 mg Oral BID Sampson Menezes M.D.   15 mg at 08/13/23 0554    morphine (MS IR) tablet 30 mg  30 mg Oral 4X/DAY Sampson Menezes M.D.   30 mg at 08/12/23 2212    DULoxetine (Cymbalta) capsule 60 mg  60 mg Oral DAILY Sampson Menezes M.D.   60 mg at 08/13/23 0553       Fluids    Intake/Output Summary (Last 24 hours) at 8/13/2023 0917  Last data filed at 8/13/2023 0600  Gross per 24 hour   Intake 250 ml   Output 1250 ml   Net -1000 ml       Laboratory          Recent Labs     08/12/23  1740 08/13/23  0110 08/13/23  0600   SODIUM 119* 119* 122*   POTASSIUM 5.1 4.9 5.1   CHLORIDE 87* 86* 90*   CO2 16* 17* 18*   BUN 36* 38* 39*   CREATININE 2.63* 2.68* 2.68*   MAGNESIUM 2.3  --   --    CALCIUM 8.0* 7.8* 7.6*     Recent Labs     08/12/23  1348 08/12/23  1740 08/13/23  0110 08/13/23  0600   ALTSGPT 242*  --  240* 281*   ASTSGOT 694*  --  517* 606*   ALKPHOSPHAT 311*  --  245* 229*   TBILIRUBIN 2.8*  --  1.7* 1.5   GLUCOSE 111* 98 91 90     Recent Labs     08/12/23  1348 08/13/23  0110 08/13/23  0600   WBC 11.2* 9.1  --    NEUTSPOLYS 75.00*  --   --    LYMPHOCYTES 17.00*  --   --    MONOCYTES 7.40  --   --    EOSINOPHILS 0.00  --   --    BASOPHILS 0.10  --   --    ASTSGOT 694* 517* 606*   ALTSGPT 242* 240* 281*   ALKPHOSPHAT 311* 245* 229*   TBILIRUBIN 2.8* 1.7* 1.5     Recent Labs     08/12/23  1348 08/13/23  0110    RBC 3.67* 3.27*   HEMOGLOBIN 10.5* 9.4*   HEMATOCRIT 32.1* 28.2*   PLATELETCT 265 227         Assessment/Plan  * Acute diastolic heart failure (HCC)- (present on admission)  Assessment & Plan  His aortic regur was mild ECHO 7/16/2023 severe ECHO 8/12 moderate  Diastolic heart failure   EF 55%  LBBB  Symptoms improved with diuresis  Lasix Bid   Pressor support  Meets criteria for valvular surgery based on symptoms and severity of AI - will defer to cardiology  BP too long for ACE  C/p jaw pain today  Will trend troponins    KATERYNA (acute kidney injury) (HCC)- (present on admission)  Assessment & Plan  Based on clinical exam and chart review, likely due to cardiorenal disease  MAP 65 and systolic 100   Has not needed levophed    Left bundle branch block  Assessment & Plan  New onset likely due to worsening AI    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)  Assessment & Plan  Seen on ECHO at [place] 7/2023 but current ECHO says probable atrial mass  Calcified on ant leaflet - 2.2 cm by 2.3 cm on the base of the anterior leaflet on 7/2023 current echo possible atrial mass  Assume may need a MIA but will defer to cardiology to assess  ESR is 40 somewhat reassuring that this is not a vegetation    Elevated LFTs- (present on admission)  Assessment & Plan  Due to congestion secondary to cardiac cause  monitor    Cardiorenal syndrome- (present on admission)  Assessment & Plan  Due to decompensated AI  Lasix bid   Improved with diuresis and creatinine plateued    Hyponatremia- (present on admission)  Assessment & Plan  Hypervolemia hyponatremia  Improving at 122  Mental status is intact    Chronic pain syndrome- (present on admission)  Assessment & Plan  On home dose of long acting and immediate release morphine    Rheumatoid arthritis (HCC)- (present on admission)  Assessment & Plan  Known hx on humira         VTE:  Heparin  Ulcer: Not Indicated  Lines: Rivera Catheter  Ongoing indication addressed    I have performed a physical  exam and reviewed and updated ROS and Plan today (8/13/2023). In review of yesterday's note (8/12/2023), there are no changes except as documented above.     Discussed patient condition and risk of morbidity and/or mortality with Pharmacy, Charge nurse / hot rounds, and Patient  The patient remains critically ill.  Critical care time = 33 minutes in directly providing and coordinating critical care and extensive data review.  No time overlap and excludes procedures.

## 2023-08-14 PROBLEM — R53.1 GENERALIZED WEAKNESS: Status: ACTIVE | Noted: 2022-06-15

## 2023-08-14 PROBLEM — E87.6 HYPOKALEMIA: Status: ACTIVE | Noted: 2023-01-01

## 2023-08-14 NOTE — PROGRESS NOTES
Sailaja from Lab called with critical result of AST 1489 at 1253. Critical lab result read back to Sailaja .   Dr. King notified of critical lab result at 1254.  Critical lab result read back by Dr. King. Orders received.

## 2023-08-14 NOTE — ASSESSMENT & PLAN NOTE
"As per ICU notes:  \"Seen on ECHO at [place] 7/2023 but current ECHO says probable atrial mass  Calcified on ant leaflet - 2.2 cm by 2.3 cm on the base of the anterior leaflet on 7/2023 current echo possible atrial mass\"  Cardiology was consulted, recommended continue diuresis  "

## 2023-08-14 NOTE — ASSESSMENT & PLAN NOTE
Continue diuresis  Creatinine continues to improve  If worsening we will consider consulting nephrology

## 2023-08-14 NOTE — PROGRESS NOTES
Telemetry Shift Summary     Rhythm SR with BBB   HR Range 79-80  Ectopy rPVC  Measurements  0.26/0.14/0.44     Normal Values  Rhythm SR  HR Range    Measurements 0.12-0.20 / 0.06-0.10  / 0.30-0.52

## 2023-08-14 NOTE — PROGRESS NOTES
Beti from Lab called with critical result of AST at 1119. Critical lab result read back to Beti.   Charge nurse notified of critical lab result at 0700 to alert the attending doctor of the critical lab result.

## 2023-08-14 NOTE — ASSESSMENT & PLAN NOTE
Hx of  Patient on Humira  Both ESR and CRP elevated .  Prednisone 40 mg daily  Pain management with home morphine  Improving

## 2023-08-14 NOTE — PROGRESS NOTES
Received report from Ramon CUEVAS, at pt bedside. Pt C/O of discomfort and pain radiating in his jaw and shoulder, POC discussed. Pt given heat packs and placed on his shoulder. Pt refused Q2 hour repositioning and acknowledge education and stated it would cause more discomfort. Leg and knee elevated with pillow. Call light and belongings within reach. Bed locked and in low position. Alarm on and fall precautions in place.

## 2023-08-14 NOTE — CONSULTS
Gastroenterology Initial Consult Note               Author: Tarik postendoscopy M.D. with MARTÍN Kohler Date & Time Created: 8/14/2023 1:48 PM       Patient ID:  Name:             Richard Hubbard  YOB: 1975  Age:                 48 y.o.  male  MRN:               [id number]      Referring Provider:  You King MD      Presenting Chief Complaint:  Elevated liver enzymes      History of Present Illness:    This is a very pleasant 48 y.o. male with the past medical history that includes bipolar disorder, Lucila-en-Y gastric bypass surgery, rheumatoid arthritis on Humira, GERD, eczema, ADD, paroxysmal atrial fibrillation status cardiovascular seen in 2019, below the knee amputation who is seen in consultation for elevated liver enzymes.  He was admitted on 8/12/2023 with complaints of shortness of breath and weakness. On this admission here, noted to have sodium 120, creatinine 2.66 initially.  CMP most recent with sodium 129, potassium 2.8, chloride 90, BUN 30, creatinine 1.68, calcium 8, AST 1489, 616 ALT, , T. bili 1.2.  Initial BNP over 25,000.  He has been diagnosed by the primary team with acute diastolic heart failure.  It seems he might be a candidate for valve replacement surgery, but no current plans for this.  He is being diuresed.    He was just recently hospitalized at an outside facility \[facility]\"" on 7/16/2023 with findings of cellulitis of the right leg.  Echocardiogram was done at that time which showed severe aortic regurgitation, moderate aortic stenosis, and mild to moderate mitral regurgitation.  Treated with IV Unasyn at the outside facility and then this was transitioned to Augmentin on 7/20/2023 for 11 days.     Looking back at historical data to 2019 within the system he has had intermittently elevated AST and ALT with mild elevation.  Alkaline phosphatase more recently has increased up to 400.  Bilirubin has been normal in the past.  He also had an  ultrasound from 2019 which demonstrated a possible dilated portal vein.      Review of Systems:  Review of Systems   Constitutional:  Positive for malaise/fatigue. Negative for chills and fever.   HENT: Negative.     Eyes: Negative.    Respiratory:  Positive for shortness of breath. Negative for cough and wheezing.    Cardiovascular:  Positive for leg swelling. Negative for chest pain.   Gastrointestinal:  Negative for abdominal pain, blood in stool, melena, nausea and vomiting.   Genitourinary:  Negative for frequency and hematuria.   Musculoskeletal:  Positive for joint pain, myalgias and neck pain.   Neurological:  Positive for weakness. Negative for dizziness.   Psychiatric/Behavioral:  Negative for hallucinations and memory loss.              Past Medical History:  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    Hemorrhagic disorder (HCC)     hx of bleeding ulcers    History of bleeding ulcers 2/12/2015    Hypertension 05/03/2021    pt states well controlled on meds    Insomnia 5/21/2009    Migraine 5/21/2009    Morning headache     Obesity, morbid ([facility]) 5/21/2009    Osteomyelitis ([facility]) 10/7/2019    Pain 09/2019    Chronic knee and back pain    Pubic ramus fracture ([facility]) 9/28/2019    Rheumatoid arteritis ([facility]) 09/2019    knee, feet right hand    Sleep apnea 5/21/2009    Did not tolerate cpap, does not use it    Snoring      Active  Hospital Problems    Diagnosis     Hypokalemia [E87.6]     Acute diastolic heart failure (HCC) [I50.31]     Cardiorenal syndrome [I13.10]     Elevated liver function tests [R79.89]     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]     KATERYNA (acute kidney injury) (HCC) [N17.9]     Hyponatremia [E87.1]     Generalized weakness [R53.1]     Chronic pain syndrome [G89.4]     Rheumatoid arthritis (HCC) [M06.9]          Past Surgical History:  Past Surgical History:   Procedure Laterality Date    KNEE AMPUTATION BELOW Left 3/9/2023    Procedure: AMPUTATION, BELOW KNEE;  Surgeon: Wayne Chambers M.D.;  Location: SURGERY [facility];  Service: Orthopedics    PB TOTAL KNEE ARTHROPLASTY Right 7/22/2020    Procedure: ARTHROPLASTY, KNEE, TOTAL;  Surgeon: Temo Pires M.D.;  Location: [facility];  Service: Orthopedics    TENDON TRANSFER Right 1/22/2020    Procedure: RIGHT DISTAL ULNA RESECTION AND EXTENSOR TENDON TRANSFER;  Surgeon: Marine Rushing M.D.;  Location: [facility];  Service: Orthopedics    OTHER ORTHOPEDIC SURGERY Right 2020    total right knee replacement    IRRIGATION & DEBRIDEMENT ORTHO Left 10/9/2019    Procedure: IRRIGATION AND DEBRIDEMENT, WOUND - PELVIC OSTEOMYELITIS;  Surgeon: You Olivares M.D.;  Location: [facility];  Service: Orthopedics    OTHER SURGICAL PROCEDURE  2019    osteomelitis of pelvis cleaned    GASTRIC BYPASS LAPAROSCOPIC  2000    Lucila en y    CHOLECYSTECTOMY  2000    OTHER SURGICAL PROCEDURE      repair of broken penis           Hospital Medications:  Current Facility-Administered Medications   Medication Dose Frequency Provider Last Rate Last Admin    potassium chloride SA (Kdur) tablet 40 mEq  40 mEq Once You Savage M.D.        ondansetron (Zofran) syringe/vial injection 4 mg  4 mg Q4HRS PRN Sampson Menezes M.D.   4 mg at 08/13/23 1814    senna-docusate (Pericolace Or Senokot S) 8.6-50 MG per tablet 2 Tablet  2 Tablet BID Sampson  MARCUS Menezes   2 Tablet at 08/13/23 1746    And    polyethylene glycol/lytes (Miralax) PACKET 1 Packet  1 Packet QDAY PRN Sampson Menezes M.D.   1 Packet at 08/13/23 1754    And    magnesium hydroxide (Milk Of Magnesia) suspension 30 mL  30 mL QDAY PRN Sampson Menezes M.D.        And    bisacodyl (Dulcolax) suppository 10 mg  10 mg QDAY PRN Sampson Menezes M.D.        heparin injection 5,000 Units  5,000 Units Q8HRS Sampson Menezes M.D.   5,000 Units at 08/14/23 0550    furosemide (Lasix) injection 40 mg  40 mg BID DIURETIC Sampson Menezes M.D.   40 mg at 08/14/23 0551    morphine ER (Ms Contin) tablet 15 mg  15 mg BID Sampson Menezes M.D.   15 mg at 08/14/23 0551    morphine (MS IR) tablet 30 mg  30 mg 4X/DAY Sampson Menezes M.D.   30 mg at 08/14/23 1020    DULoxetine (Cymbalta) capsule 60 mg  60 mg DAILY Sampson Menezes M.D.   60 mg at 08/14/23 0551   Last reviewed on 8/12/2023  2:40 PM by Gael Lazo       Current Outpatient Medications:  Medications Prior to Admission   Medication Sig Dispense Refill Last Dose    CALCIUM PO Take 1 Tablet by mouth every day.   UNK at [facility]    VITAMIN D PO Take 1 Tablet by mouth every day.   UNK at [facility]    Adalimumab (HUMIRA PEN) 40 MG/0.4ML Pen-injector Kit INJECT 40MG SUBCUTANEOUSLY  EVERY 2 WEEKS 2 Each 4 8/10/2023 at [facility]    pregabalin (LYRICA) 200 MG capsule Take 200 mg by mouth every day.   8/9/2023 at [facility]    morphine ER (MS CONTIN) 15 MG Tab CR tablet Take 15 mg by mouth 2 times a day.   8/11/2023 at [facility]    morphine (MS IR) 30 MG tablet Take 30 mg by mouth 4 times a day.   8/11/2023 at [facility]    albuterol 108 (90 Base) MCG/ACT Aero Soln inhalation aerosol Inhale 2 Puffs every 6 hours as needed for Shortness of Breath. 8.5 g 0 PRN at PRN    DULoxetine (CYMBALTA) 60 MG Cap DR Particles delayed-release capsule Take 1 Capsule by mouth every day. 30 Capsule 2 8/9/2023 at [facility]    Naloxone (NARCAN) 4 MG/0.1ML Liquid One spray  "in one nostril for overdose and call 911. 1 Package 0 PRN at PRN         Medication Allergies:  Allergies   Allergen Reactions    Nsaids      Bleeding, \"I had a bleeding ulcer\"    Diphenhydramine Unspecified     \"I get agitated\"    Mirtazapine Unspecified     I had a hard time waking up, lacking mental focus.     Penicillins      \"Had some dizziness\"  Tolerated Unasyn 10/2019  Tolerated Zosyn 2023    Promethazine Unspecified     \"I get very agitated\"         Family Medical History:  Family History   Problem Relation Age of Onset    Hypertension Mother     Arthritis Mother     Depression Mother     Cancer Father         bladder    Schizophrenia Father     Cancer Maternal Grandmother         breast    Heart Disease Maternal Grandmother     Psychiatric Illness Other     Stroke Maternal Aunt     Other Neg Hx         no connective tissue disorders or known aortic disease         Social History:  Social History     Socioeconomic History    Marital status:      Spouse name: Not on file    Number of children: Not on file    Years of education: Not on file    Highest education level: Bachelor's degree (e.g., BA, AB, BS)   Occupational History    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   Tobacco Use    Smoking status: Every Day     Packs/day: 0.25     Types: Cigarettes, Cigars     Last attempt to quit: 2023     Years since quittin.5     Passive exposure: Current    Smokeless tobacco: Never    Tobacco comments:     occasional cigar   Vaping Use    Vaping Use: Never used   Substance and Sexual Activity    Alcohol use: No     Alcohol/week: 0.0 oz     Comment: quit  ago- past ETOH abuse    Drug use: No    Sexual activity: Yes     Partners: Female     Comment: ;    Other Topics Concern     Service No    Blood Transfusions Yes    Caffeine Concern No    Occupational Exposure No    Hobby Hazards No    Sleep Concern No    Stress Concern Yes    Weight Concern Yes    Special Diet No    Back Care No    Exercise " "No    Bike Helmet No    Seat Belt Yes    Self-Exams Yes   Social History Narrative    , lives in [place]     Social Determinants of Health     Financial Resource Strain: Low Risk  (2/28/2023)    Overall Financial Resource Strain (CARDIA)     Difficulty of Paying Living Expenses: Not hard at all   Food Insecurity: No Food Insecurity (2/28/2023)    Hunger Vital Sign     Worried About Running Out of Food in the Last Year: Never true     Ran Out of Food in the Last Year: Never true   Transportation Needs: No Transportation Needs (3/27/2023)    PRAPARE - Transportation     Lack of Transportation (Medical): No     Lack of Transportation (Non-Medical): No   Physical Activity: Inactive (2/28/2023)    Exercise Vital Sign     Days of Exercise per Week: 0 days     Minutes of Exercise per Session: 0 min   Stress: Stress Concern Present (2/28/2023)    Citizen of [place] of Occupational Health - Occupational Stress Questionnaire     Feeling of Stress : To some extent   Social Connections: Socially Integrated (2/28/2023)    Social Connection and Isolation Panel [NHANES]     Frequency of Communication with Friends and Family: More than three times a week     Frequency of Social Gatherings with Friends and Family: Once a week     Attends Oriental orthodox Services: More than 4 times per year     Active Member of Clubs or Organizations: Yes     Attends Club or Organization Meetings: More than 4 times per year     Marital Status:    Intimate Partner Violence: Not on file   Housing Stability: Low Risk  (2/28/2023)    Housing Stability Vital Sign     Unable to Pay for Housing in the Last Year: No     Number of Places Lived in the Last Year: 1     Unstable Housing in the Last Year: No         Vital signs:  Weight/BMI: Body mass index is 31.32 kg/m².  /55   Pulse 82   Temp 36.4 °C (97.6 °F) (Oral)   Resp 18   Ht 1.778 m (5' 10\")   Wt 99 kg (218 lb 4.1 oz)   SpO2 92%   Vitals:    08/14/23 0116 08/14/23 0419 08/14/23 0740 " 08/14/23 1151   BP: 104/59 111/56 100/56 100/55   Pulse: 83 82 79 82   Resp: 18 18 18 18   Temp: 36.5 °C (97.7 °F) 36.5 °C (97.7 °F) 36.7 °C (98 °F) 36.4 °C (97.6 °F)   TempSrc: Oral Oral Oral Oral   SpO2: 98% 97% 98% 92%   Weight:  99 kg (218 lb 4.1 oz)     Height:         Oxygen Therapy:  Pulse Oximetry: 92 %, O2 (LPM): 2, O2 Delivery Device: Silicone Nasal Cannula    Intake/Output Summary (Last 24 hours) at 8/14/2023 1348  Last data filed at 8/14/2023 0700  Gross per 24 hour   Intake --   Output 3600 ml   Net -3600 ml         Physical Exam:  Physical Exam  Vitals and nursing note reviewed.   Constitutional:       Appearance: He is ill-appearing.   HENT:      Head: Normocephalic and atraumatic.      Right Ear: External ear normal.      Left Ear: External ear normal.      Nose: Nose normal.      Mouth/Throat:      Mouth: Mucous membranes are moist.      Pharynx: Oropharynx is clear.   Eyes:      General: No scleral icterus.     Extraocular Movements: Extraocular movements intact.      Pupils: Pupils are equal, round, and reactive to light.   Cardiovascular:      Rate and Rhythm: Normal rate and regular rhythm.      Pulses: Normal pulses.      Heart sounds: Murmur (Systolic and diastolic) heard.   Pulmonary:      Effort: Pulmonary effort is normal. No respiratory distress.      Breath sounds: Normal breath sounds.   Abdominal:      General: Abdomen is flat. Bowel sounds are normal.      Palpations: Abdomen is soft.      Hernia: A hernia (Mid-abdominal incisional) is present.   Musculoskeletal:         General: No tenderness or signs of injury.      Cervical back: Neck supple.   Lymphadenopathy:      Cervical: No cervical adenopathy.   Skin:     General: Skin is warm and dry.      Coloration: Skin is pale.   Neurological:      General: No focal deficit present.      Mental Status: He is alert and oriented to person, place, and time.   Psychiatric:         Thought Content: Thought content normal.         Judgment:  Judgment normal.           Labs:  Recent Labs     08/12/23  1740 08/13/23  0110 08/13/23  0600 08/13/23  0937 08/14/23  0011 08/14/23  0528 08/14/23  1205   SODIUM 119*   < > 122*   < > 125* 128* 129*   POTASSIUM 5.1   < > 5.1   < > 3.4* 3.2* 2.8*   CHLORIDE 87*   < > 90*   < > 89* 92* 90*   CO2 16*   < > 18*   < > 22 23 27   BUN 36*   < > 39*   < > 36* 34* 30*   CREATININE 2.63*   < > 2.68*   < > 2.04* 1.88* 1.68*   MAGNESIUM 2.3  --  2.3  --   --   --   --    CALCIUM 8.0*   < > 7.6*   < > 7.6* 7.6* 8.0*    < > = values in this interval not displayed.     Recent Labs     08/14/23  0011 08/14/23  0528 08/14/23  1205   ALTSGPT 493* 498* 616*   ASTSGOT 1149* 1119* 1489*   ALKPHOSPHAT 249* 218* 247*   TBILIRUBIN 1.0 0.9 1.2   GLUCOSE 91 90 96     Recent Labs     08/12/23  1348 08/13/23  0110 08/13/23  0600 08/14/23  0011 08/14/23  0528 08/14/23  1205   WBC 11.2* 9.1  --   --   --   --    NEUTSPOLYS 75.00*  --   --   --   --   --    LYMPHOCYTES 17.00*  --   --   --   --   --    MONOCYTES 7.40  --   --   --   --   --    EOSINOPHILS 0.00  --   --   --   --   --    BASOPHILS 0.10  --   --   --   --   --    ASTSGOT 694* 517*   < > 1149* 1119* 1489*   ALTSGPT 242* 240*   < > 493* 498* 616*   ALKPHOSPHAT 311* 245*   < > 249* 218* 247*   TBILIRUBIN 2.8* 1.7*   < > 1.0 0.9 1.2    < > = values in this interval not displayed.     Recent Labs     08/12/23  1348 08/13/23  0110   RBC 3.67* 3.27*   HEMOGLOBIN 10.5* 9.4*   HEMATOCRIT 32.1* 28.2*   PLATELETCT 265 227     Recent Results (from the past 24 hour(s))   TROPONIN    Collection Time: 08/13/23  3:03 PM   Result Value Ref Range    Troponin T 28 (H) 6 - 19 ng/L   Comp Metabolic Panel    Collection Time: 08/13/23  3:03 PM   Result Value Ref Range    Sodium 124 (L) 135 - 145 mmol/L    Potassium 4.3 3.6 - 5.5 mmol/L    Chloride 90 (L) 96 - 112 mmol/L    Co2 19 (L) 20 - 33 mmol/L    Anion Gap 15.0 7.0 - 16.0    Glucose 104 (H) 65 - 99 mg/dL    Bun 39 (H) 8 - 22 mg/dL    Creatinine  2.34 (H) 0.50 - 1.40 mg/dL    Calcium 7.9 (L) 8.4 - 10.2 mg/dL    Correct Calcium 8.9 8.5 - 10.5 mg/dL    AST(SGOT) 994 (H) 12 - 45 U/L    ALT(SGPT) 416 (H) 2 - 50 U/L    Alkaline Phosphatase 260 (H) 30 - 99 U/L    Total Bilirubin 1.2 0.1 - 1.5 mg/dL    Albumin 2.8 (L) 3.2 - 4.9 g/dL    Total Protein 6.7 6.0 - 8.2 g/dL    Globulin 3.9 (H) 1.9 - 3.5 g/dL    A-G Ratio 0.7 g/dL   ESTIMATED GFR    Collection Time: 23  3:03 PM   Result Value Ref Range    GFR (CKD-EPI) 34 (A) >60 mL/min/1.73 m 2   EKG    Collection Time: 23  5:08 PM   Result Value Ref Range    Report       [facility]    Test Date:  2023  Pt Name:    LESLIE BADILLO             Department: ICU  MRN:        [id number]                      Room:       3316  Gender:     Male                         Technician: [id number]  :        1975                   Requested By:JONAH WATSON  Order #:    [id number]                    Reading MD: Chava Naidu MD    Measurements  Intervals                                Axis  Rate:       80                           P:          12  HI:         242                          QRS:        -44  QRSD:       159                          T:          63  QT:         452  QTc:        522    Interpretive Statements  Sinus rhythm  Prolonged HI interval  Left bundle branch block  Baseline wander in lead(s) V3  Compared to ECG 2023 14:02:00  No significant changes  Electronically Signed On 2023 18:18:07 PDT by Chava Naidu MD     TROPONIN    Collection Time: 23  9:16 PM   Result Value Ref Range    Troponin T 30 (H) 6 - 19 ng/L   Comp Metabolic Panel    Collection Time: 23 12:11 AM   Result Value Ref Range    Sodium 125 (L) 135 - 145 mmol/L    Potassium 3.4 (L) 3.6 - 5.5 mmol/L    Chloride 89 (L) 96 - 112 mmol/L    Co2 22 20 - 33 mmol/L    Anion Gap 14.0 7.0 - 16.0    Glucose 91 65 - 99 mg/dL    Bun 36 (H) 8 - 22 mg/dL    Creatinine 2.04 (H) 0.50 - 1.40 mg/dL    Calcium 7.6  (L) 8.4 - 10.2 mg/dL    Correct Calcium 8.6 8.5 - 10.5 mg/dL    AST(SGOT) 1149 (HH) 12 - 45 U/L    ALT(SGPT) 493 (H) 2 - 50 U/L    Alkaline Phosphatase 249 (H) 30 - 99 U/L    Total Bilirubin 1.0 0.1 - 1.5 mg/dL    Albumin 2.7 (L) 3.2 - 4.9 g/dL    Total Protein 6.5 6.0 - 8.2 g/dL    Globulin 3.8 (H) 1.9 - 3.5 g/dL    A-G Ratio 0.7 g/dL   ESTIMATED GFR    Collection Time: 08/14/23 12:11 AM   Result Value Ref Range    GFR (CKD-EPI) 40 (A) >60 mL/min/1.73 m 2   Comp Metabolic Panel    Collection Time: 08/14/23  5:28 AM   Result Value Ref Range    Sodium 128 (L) 135 - 145 mmol/L    Potassium 3.2 (L) 3.6 - 5.5 mmol/L    Chloride 92 (L) 96 - 112 mmol/L    Co2 23 20 - 33 mmol/L    Anion Gap 13.0 7.0 - 16.0    Glucose 90 65 - 99 mg/dL    Bun 34 (H) 8 - 22 mg/dL    Creatinine 1.88 (H) 0.50 - 1.40 mg/dL    Calcium 7.6 (L) 8.4 - 10.2 mg/dL    Correct Calcium 8.8 8.5 - 10.5 mg/dL    AST(SGOT) 1119 (HH) 12 - 45 U/L    ALT(SGPT) 498 (H) 2 - 50 U/L    Alkaline Phosphatase 218 (H) 30 - 99 U/L    Total Bilirubin 0.9 0.1 - 1.5 mg/dL    Albumin 2.5 (L) 3.2 - 4.9 g/dL    Total Protein 6.2 6.0 - 8.2 g/dL    Globulin 3.7 (H) 1.9 - 3.5 g/dL    A-G Ratio 0.7 g/dL   ESTIMATED GFR    Collection Time: 08/14/23  5:28 AM   Result Value Ref Range    GFR (CKD-EPI) 44 (A) >60 mL/min/1.73 m 2   Comp Metabolic Panel    Collection Time: 08/14/23 12:05 PM   Result Value Ref Range    Sodium 129 (L) 135 - 145 mmol/L    Potassium 2.8 (L) 3.6 - 5.5 mmol/L    Chloride 90 (L) 96 - 112 mmol/L    Co2 27 20 - 33 mmol/L    Anion Gap 12.0 7.0 - 16.0    Glucose 96 65 - 99 mg/dL    Bun 30 (H) 8 - 22 mg/dL    Creatinine 1.68 (H) 0.50 - 1.40 mg/dL    Calcium 8.0 (L) 8.4 - 10.2 mg/dL    Correct Calcium 9.1 8.5 - 10.5 mg/dL    AST(SGOT) 1489 (HH) 12 - 45 U/L    ALT(SGPT) 616 (H) 2 - 50 U/L    Alkaline Phosphatase 247 (H) 30 - 99 U/L    Total Bilirubin 1.2 0.1 - 1.5 mg/dL    Albumin 2.6 (L) 3.2 - 4.9 g/dL    Total Protein 6.6 6.0 - 8.2 g/dL    Globulin 4.0 (H) 1.9 -  3.5 g/dL    A-G Ratio 0.7 g/dL   ESTIMATED GFR    Collection Time: 08/14/23 12:05 PM   Result Value Ref Range    GFR (CKD-EPI) 50 (A) >60 mL/min/1.73 m 2         Radiology Review:  EC-ECHOCARDIOGRAM COMPLETE W/O CONT   Final Result      DX-CHEST-PORTABLE (1 VIEW)   Final Result      1.  Diffuse mixed interstitial and airspace opacities with enlarged cardiac silhouette could be related to edema, however multifocal pneumonia is not excluded.      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 He is now admitted to this facility 8/12/2023 with acute diastolic heart failure, cardiorenal syndrome, and elevated liver enzymes.  Suspicion for acute hepatic congestion versus ischemic hepatitis versus drug-induced liver injury versus flare of an underlying liver issue such as autoimmune hepatitis. He denies alcohol use    Assessment:  -Elevated liver enzymes  -Acute diastolic heart failure  -Cardiorenal syndrome  -Mitral valve versus atrial mass  -A-fib rheumatoid arthritis on Humira   -Cellulitis right lower extremity cellulitis  so status post Augmentin    Plan:  -Ultrasound with dopplers of the abdomen  -AVANI, ASMA, IgG, AMA, Ceruloplasmin, Alpha-1 antitrypsin, Acute Hepatitis panel  -Avoid hepatotoxins  -Daily CMP, INR    GI Attending -    Patient seen, examined, chart reviewed and case discussed and plan arrived at with [email or web address].  Agree with this note.    Patient has multiple possible etiologies for his abnormal liver enzymes.  As above, I favor multifactorial heart failure but also consider drug-induced liver injury.    Tarik Moreno M.D.    Francois Crisostomo, [email or web address].      Thank you for inviting me to participate in the care of this patient. Please do not hesitate to call GI consultants with additional questions/concerns or changes in the patient's clinical status at [phone].      Core Quality Measures   Reviewed items:  Labs, Medications and Radiology reports reviewed

## 2023-08-14 NOTE — CARE PLAN
The patient is Stable - Low risk of patient condition declining or worsening    Shift Goals  Clinical Goals: Manage pain and labs, 1.5 L fluid restriction  Patient Goals: Comfort, sleep  Family Goals: Unable to assess    Progress made toward(s) clinical / shift goals:    Problem: Knowledge Deficit - Standard  Goal: Patient and family/care givers will demonstrate understanding of plan of care, disease process/condition, diagnostic tests and medications  Outcome: Progressing  Note: Pt requested prednisone for rheumatoid flare up. Discussed pt's request with MD and relayed the MD's concerns to the pt. Pt acknowledge concerns and understands education.       Problem: Fall Risk  Goal: Patient will remain free from falls  Outcome: Progressing  Note: Pt scored high fall risk. Interventions in place and patient aware of increased risk for falls. Bed alarm is on and pt reported he will no attempt to get out of bed.       Problem: Skin Integrity  Goal: Skin integrity is maintained or improved  Outcome: Progressing  Note: Pt educated on q2 hour repositioning. Pt reported at a later time that he is able to reposition self, but does not want to due to discomfort and pain. Pt accepted education and was provided extra cushioning for his lower extremities.      Problem: Pain - Standard  Goal: Alleviation of pain or a reduction in pain to the patient’s comfort goal  Outcome: Progressing  Note: Pt reported increased discomfort that was not alleviated with morphine. Hot packs placed on patients shoulder and arm. Contacted MD about different interventions.      Problem: Hemodynamics  Goal: Patient's hemodynamics, fluid balance and neurologic status will be stable or improve  Outcome: Progressing  Note: Pt aware of fluid restriction and has not requested more fluids besides the ones present from day. Blood pressures in the 100s systolically and will be continued to be monitored.       Patient is not progressing towards the following  goals:

## 2023-08-14 NOTE — ASSESSMENT & PLAN NOTE
Cardiac congestion? Continue with diuresis  Denying abdominal pain  Hepatitis panel negative for acute findings.  US-RUQ negative for acute findings  Improving, will need outpatient follow-up

## 2023-08-14 NOTE — PROGRESS NOTES
"Hospital Medicine Daily Progress Note    Date of Service  8/14/2023    Chief Complaint  Richard Hubbard II is a 48 y.o. male admitted 8/12/2023 with SOB    Hospital Course  As per chart review:  \"48 y.o. 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  On ant leaflet on the Mitral is calcified mass 3 cm.     CXR c/w pulm edema   , creatinine 2.66, lfts elevated, pro BNP [id number]  EKG pt now has LBBB which is new     I discussed the plan of care with   D/w DR. Ivey from cardiology the ECHO findings of 3 weeks ago and pt status, recommends no need to transfer to [facility] at this time   Optimize cardiac status with diuresis and if worsens to transfer to [facility] for further evaluation by cardiology     In ER he received 300 cc of fluid, vanc and zosyn     Pt being admitted to [facility] ICU      8/13: ECHO yesterday is different readings than the [place] ECHO  \"Normal left ventricular chamber size.  The left ventricular ejection fraction is visually estimated to be 55%.  Normal right ventricular size and systolic function.  Moderate mitral regurgitation.  Moderate aortic valve stenosis.  Vmax is3.6  m/s.  Moderate aortic insufficiency.  Estimated right ventricular systolic pressure is 50 mmHg.\"     AI is quantified as moderate now  Mitral calcified mass is read as possible atrial mass  Diastolic heart failure Grade I  Do think pt weill need a MIA  Consult rounding cardiologist on Monday  Pt feels better with diuresis.\"    Interval Problem Update  8/14: Patient seen at bedside this morning.  Overall the patient feels better.  I did consult and discussed case with cardiology with Dr. Clements who " recommends only to continue with diuresis for now.  If the patient decompensates the patient would potentially require transfer to [facility] for further management and care cardiology.  We have ordered right upper quadrant ultrasound to evaluate for liver function test, however I suspect this is most likely due to cardiac congestion we will continue with diuresis for now.  I have also ordered hepatitis panel.  Patient denying abdominal pain at this time.    --UPDATE: Worsening AST, hepatitis panel pending as well as RUQ-US. We have consulted GI, we appreciate further recommendations.    I have discussed this patient's plan of care and discharge plan at IDT rounds today with Case Management, Nursing, Nursing leadership, and other members of the IDT team.    Consultants/Specialty  cardiology - Dr Clements    Code Status  Full Code    Disposition  The patient is not medically cleared for discharge to home or a post-acute facility.        Review of Systems  Review of Systems   Constitutional:  Positive for malaise/fatigue. Negative for chills and fever.   HENT:  Negative for hearing loss and nosebleeds.    Eyes:  Negative for blurred vision and double vision.   Respiratory:  Positive for shortness of breath.    Cardiovascular:  Positive for leg swelling. Negative for chest pain and palpitations.   Gastrointestinal:  Negative for abdominal pain, heartburn and vomiting.   Genitourinary:  Negative for dysuria and urgency.   Musculoskeletal:  Negative for back pain and falls.   Skin:  Negative for itching and rash.   Neurological:  Negative for dizziness and headaches.   Psychiatric/Behavioral:  Negative for substance abuse. The patient is not nervous/anxious.    All other systems reviewed and are negative.       Physical Exam  Temp:  [36.4 °C (97.6 °F)-36.8 °C (98.2 °F)] 36.7 °C (98 °F)  Pulse:  [79-83] 79  Resp:  [18-19] 18  BP: (100-111)/(56-65) 100/56  SpO2:  [97 %-99 %] 98 %    Physical Exam  Vitals and nursing note reviewed.    Constitutional:       Appearance: He is obese. He is ill-appearing.   HENT:      Head: Normocephalic and atraumatic.      Right Ear: External ear normal.      Left Ear: External ear normal.      Nose: Nose normal.      Mouth/Throat:      Mouth: Mucous membranes are moist.      Pharynx: Oropharynx is clear.   Eyes:      General:         Right eye: No discharge.         Left eye: No discharge.      Extraocular Movements: Extraocular movements intact.      Pupils: Pupils are equal, round, and reactive to light.   Cardiovascular:      Rate and Rhythm: Normal rate and regular rhythm.      Heart sounds: Murmur heard.   Pulmonary:      Breath sounds: Rales present.      Comments: On supplemental oxygen  Abdominal:      General: Abdomen is flat. Bowel sounds are normal. There is no distension.      Palpations: Abdomen is soft.      Tenderness: There is no abdominal tenderness.   Musculoskeletal:      Cervical back: Normal range of motion and neck supple.      Right lower leg: Edema present.      Comments: Left BKA   Skin:     General: Skin is warm.   Neurological:      General: No focal deficit present.      Mental Status: He is alert and oriented to person, place, and time.   Psychiatric:         Mood and Affect: Mood normal.         Behavior: Behavior normal.         Fluids    Intake/Output Summary (Last 24 hours) at 8/14/2023 1219  Last data filed at 8/14/2023 0700  Gross per 24 hour   Intake --   Output 3600 ml   Net -3600 ml       Laboratory  Recent Labs     08/12/23  1348 08/13/23  0110   WBC 11.2* 9.1   RBC 3.67* 3.27*   HEMOGLOBIN 10.5* 9.4*   HEMATOCRIT 32.1* 28.2*   MCV 87.5 86.2   MCH 28.6 28.7   MCHC 32.7 33.3   RDW 46.3 45.5   PLATELETCT 265 227   MPV 10.8 10.4     Recent Labs     08/13/23  1503 08/14/23  0011 08/14/23  0528   SODIUM 124* 125* 128*   POTASSIUM 4.3 3.4* 3.2*   CHLORIDE 90* 89* 92*   CO2 19* 22 23   GLUCOSE 104* 91 90   BUN 39* 36* 34*   CREATININE 2.34* 2.04* 1.88*   CALCIUM 7.9* 7.6* 7.6*  "                  Imaging  EC-ECHOCARDIOGRAM COMPLETE W/O CONT   Final Result      DX-CHEST-PORTABLE (1 VIEW)   Final Result      1.  Diffuse mixed interstitial and airspace opacities with enlarged cardiac silhouette could be related to edema, however multifocal pneumonia is not excluded.      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 They are recommending to continue diuresis for now.    Elevated liver function tests  Assessment & Plan  Cardiac congestion? Continue with diuresis  Denying abdominal pain  We will order Hepatitis panel  We have ordered US-RUQ    Cardiorenal syndrome- (present on admission)  Assessment & Plan  Continue diuresis  Cr seems to be improving  If worsening we will consider consulting nephrology    Hyponatremia- (present on admission)  Assessment & Plan  In the setting of volume overload  Monitor  improving    Generalized weakness  Assessment & Plan  PT/OT    Chronic pain syndrome- (present on admission)  Assessment & Plan  Continue home pain medications    Rheumatoid arthritis (HCC)- (present on admission)  Assessment & Plan  Hx of  Patient on Humira  Patient is complaining of some joint pain, we will " order Sed rate and CRP, if elevated we will consider steroids  Pain management with home morphine         VTE prophylaxis: heparin    I have performed a physical exam and reviewed and updated ROS and Plan today (8/14/2023). In review of yesterday's note (8/13/2023), there are no changes except as documented above.    Spend at least 51 minutes providing care for this patient.  This includes face-to-face interview, physical examination.  Review of lab work including CMP, previous CBC.  Reviewing previous notes including ICU notes.  Consulting and discussing with cardiology.  Discussing with multidisciplinary team including case management, nursing staff and pharmacy.  Creating plan of care, reviewing orders.

## 2023-08-14 NOTE — PROGRESS NOTES
Monitor Summary  Rhythm: SR / ST  Rate: 81  Ectopy: PVC PAC 1st degree block, bundle branch block  .30 / .16 / .48

## 2023-08-14 NOTE — ASSESSMENT & PLAN NOTE
We have consulted cardiology  I spoke with Dr Clements who recommended continue diuresis  Monitor volume status  Continue IV lasix for now, with holding parameters

## 2023-08-14 NOTE — PROGRESS NOTES
Beti from Lab called with critical result of AST at 1149. Critical lab result read back to Beti.   Dr. Hurtado notified of critical lab result at 0151.  Critical lab result read back by  0155.

## 2023-08-15 NOTE — CARE PLAN
The patient is Stable - Low risk of patient condition declining or worsening    Shift Goals  Clinical Goals: pain management, diuresis  Patient Goals: get prednisone ordered  Family Goals: Unable to assess    Progress made toward(s) clinical / shift goals: Patients lap belts removed at 0700. Patient confused at times but redirectable. Family at bedside most of the day. Reported R shoulder pain and medicated per MAR. Awaiting SNF placement.   Problem: Knowledge Deficit - Standard  Goal: Patient and family/care givers will demonstrate understanding of plan of care, disease process/condition, diagnostic tests and medications  Outcome: Progressing     Problem: Fall Risk  Goal: Patient will remain free from falls  Outcome: Progressing     Problem: Skin Integrity  Goal: Skin integrity is maintained or improved  Outcome: Progressing     Problem: Pain - Standard  Goal: Alleviation of pain or a reduction in pain to the patient’s comfort goal  Outcome: Progressing     Problem: Hemodynamics  Goal: Patient's hemodynamics, fluid balance and neurologic status will be stable or improve  Outcome: Progressing     Problem: Care Map:  Admission Optimal Outcome for the Heart Failure Patient  Goal: Admission:  Optimal Care of the heart failure patient  Outcome: Progressing     Problem: Care Map:  Day 1 Optimal Outcome for the Heart Failure Patient  Goal: Day 1:  Optimal Care of the heart failure patient  Outcome: Progressing     Problem: Care Map:  Day 2 Optimal Outcome for the Heart Failure Patient  Goal: Day 2:  Optimal Care of the heart failure patient  Outcome: Progressing     Problem: Care Map:  Day 3 Optimal Outcome for the Heart Failure Patient  Goal: Day 3:  Optimal Care of the heart failure patient  Outcome: Progressing     Problem: Care Map:  Day Before Discharge Optimal Outcome for the Heart Failure Patient  Goal: Day Before Discharge:  Optimal Care of the heart failure patient  Outcome: Progressing     Problem: Care Map:   Day of Discharge Optimal Outcome for the Heart Failure Patient  Goal: Day of Discharge:  Optimal Care of the heart failure patient  Outcome: Progressing       Patient is not progressing towards the following goals:

## 2023-08-15 NOTE — THERAPY
Occupational Therapy   Initial Evaluation     Patient Name: Richard Hubbard II  Age:  48 y.o., Sex:  male  Medical Record #: [id number]  Today's Date: 8/15/2023     Precautions  Precautions: Fall Risk  Comments: L BKA    Assessment  Patient is 48 y.o. male with a diagnosis of acute diastolic heart failure.  Additional factors influencing patient status / progress: Pain of R wrist/shoulder/jaw, limited activity tolerance, impaired balance, generalized weakness.  Pt resides in a [facility] w/ ramp access in [place], [place] w/ his wife and 2 children (<10 yo).  Wife works and children are of school age.  family is available to assist on a limited basis.  PLOF Mod I to Indep for ADL's, transfers and primary means of mobility via .  Pt demonstrated CGA bed mobility, min assist sit/stand, refused OOB activity secondary pain of R shoulder/wrist/jaw.  Therapist request family bring LLE prosthesis to facilitate OOB activities.  Therapist reviewed/educated pt on environmental/home safety, fall precautions, AE/DME, ADL's and transfers.  Recommmending continue skilled occupational therapy via Home Health if pt can complete 2/3 Short Term Goals (STG's).  If unable to complete 2/3 STG's, then recommednding continued skilled occupational therapy via post acute placement.    Plan    Occupational Therapy Initial Treatment Plan   Treatment Interventions: (P) Self Care / Activities of Daily Living, Adaptive Equipment, Neuro Re-Education / Balance, Therapeutic Exercises, Therapeutic Activity  Treatment Frequency: (P) 3 Times per Week  Duration: (P) Until Therapy Goals Met    DC Equipment Recommendations: (P) Unable to determine at this time  Discharge Recommendations: (P) Other - (Recommmending continue skilled occupational therapy via Home Health if pt can complete 2/3 Short Term Goals (STG's).  If unable, then recommednding continued skilled occupational therapy via post acute placement.)     Subjective    Pt was alert and cooperative  w/tx.     Objective       08/15/23 1619    Services   Is patient using  services for this encounter? No   Initial Contact Note    Initial Contact Note Order Received and Verified, Occupational Therapy Evaluation in Progress with Full Report to Follow.   Prior Living Situation   Housing / Facility 1 Story House   Steps Into Home 2  (2 steps in addition to ramp entry)   Steps In Home 0   Bathroom Set up Bathtub / Shower Combination;Shower Curtain;Tub Transfer Bench   Equipment Owned 4-Wheel Walker;Front-Wheel Walker;Wheelchair;Tub Transfer Bench;Bed Side Commode;Other (Comments)  (LLE prosthesis)   Lives with - Patient's Self Care Capacity Spouse;Child Less than 18 Years of Age   Comments Pt resides in a [facility] w/ ramp access in [place], [place] w/ his wife and 2 children (<10 yo).  Wife works and children are of school age.  family is available to assist on a limited basis.  PLOF Mod I to Indep for ADL's, transfers and primary means of mobility via wc.   Prior Level of ADL Function   Self Feeding Independent   Grooming / Hygiene Independent   Bathing Independent   Dressing Independent   Toileting Independent   Prior Level of IADL Function   Medication Management Independent   Kitchen Mobility Independent   Home Management Independent   Prior Level Of Mobility Uses Wheel Chair for in Home Mobility   Occupation (Pre-Hospital Vocational) Homemaker   Precautions   Precautions Fall Risk   Comments L BKA   Pain   Intervention Rest;Repositioned;Nurse Notified   Pain 0 - 10 Group   Location Jaw;Shoulder;Wrist   Location Orientation Right   Description Aching   Comfort Goal Comfort with Movement;Perform Activity   Therapist Pain Assessment Prior to Activity;During Activity;Post Activity;Nurse Notified;8   Cognition    Cognition / Consciousness WDL   Active ROM Upper Body   Active ROM Upper Body  WDL   Dominant Hand Right   Comments Noted pain R shoulder and wrist secondary RA   Strength Upper Body   Upper Body  Strength  X   Comments Noted pain R shoulder and wrist secondary RA   Upper Body Muscle Tone   Upper Body Muscle Tone  WDL   Coordination Upper Body   Coordination WDL   Balance Assessment   Sitting Balance (Static) Fair +   Sitting Balance (Dynamic) Fair   Standing Balance (Static) Fair -   Standing Balance (Dynamic) Poor +   Weight Shift Sitting Fair   Bed Mobility    Supine to Sit Contact Guard Assist   Sit to Supine Standby Assist   ADL Assessment   Upper Body Dressing Minimal Assist   Lower Body Dressing Moderate Assist   Toileting Moderate Assist   How much help from another person does the patient currently need...   Putting on and taking off regular lower body clothing? 2   Bathing (including washing, rinsing, and drying)? 3   Toileting, which includes using a toilet, bedpan, or urinal? 2   Putting on and taking off regular upper body clothing? 3   Taking care of personal grooming such as brushing teeth? 3   Eating meals? 4   6 Clicks Daily Activity Score 17   Functional Mobility   Sit to Stand Minimal Assist   Bed, Chair, Wheelchair Transfer Refused   Toilet Transfers Refused   Edema / Skin Assessment   Edema / Skin  Not Assessed   Activity Tolerance   Sitting Edge of Bed 10   Standing <1 min   Comments Limited by joint pain of R wrist/shoulder   Short Term Goals   Short Term Goal # 1 Mod I LB clothing management via AE/DME PRN   Short Term Goal # 2 Mod I UB clothing management   Short Term Goal # 3 Sup/SBA toileting via AE/DME PRN   Education Group   Education Provided Role of Occupational Therapist;Activities of Daily Living;Adaptive Equipment   Role of Occupational Therapist Patient Response Patient;Acceptance;Explanation;Verbal Demonstration   ADL Patient Response Patient;Acceptance;Explanation;Demonstration;Verbal Demonstration;Action Demonstration;Reinforcement Needed   Adaptive Equipment Patient Response Patient;Acceptance;Explanation;Demonstration;Verbal Demonstration;Action  Demonstration;Reinforcement Needed   Occupational Therapy Initial Treatment Plan    Treatment Interventions Self Care / Activities of Daily Living;Adaptive Equipment;Neuro Re-Education / Balance;Therapeutic Exercises;Therapeutic Activity   Treatment Frequency 3 Times per Week   Duration Until Therapy Goals Met   Problem List   Problem List Decreased Active Daily Living Skills;Decreased Functional Mobility;Decreased Activity Tolerance;Safety Awareness Deficits / Cognition;Impaired Postural Control / Balance   Anticipated Discharge Equipment and Recommendations   DC Equipment Recommendations Unable to determine at this time   Discharge Recommendations Other -  (Recommmending continue skilled occupational therapy via Home Health if pt can complete 2/3 Short Term Goals (STG's).  If unable, then recommednding continued skilled occupational therapy via post acute placement.)

## 2023-08-15 NOTE — PROGRESS NOTES
Received report from Nikia CUEVAS, at pt bedside. Pt C/O of discomfort in his jaw and shoulder, POC discussed. Pt reported understanding of POC and fluid restriction. Call light and belongings within reach. Bed locked and in low position. Alarm on and fall precautions in place.

## 2023-08-15 NOTE — CARE PLAN
The patient is Stable - Low risk of patient condition declining or worsening    Shift Goals  Clinical Goals: Pain management, 1.5 L fluid restriction, diurese  Patient Goals: Pain management, be able to sleep  Family Goals: Unable to assess    Progress made toward(s) clinical / shift goals:      Patient is not progressing towards the following goals:      Problem: Knowledge Deficit - Standard  Goal: Patient and family/care givers will demonstrate understanding of plan of care, disease process/condition, diagnostic tests and medications  Outcome: Progressing  Note: Plan of care discussed including monitor liver enzymes and diuresis.      Problem: Skin Integrity  Goal: Skin integrity is maintained or improved  Outcome: Progressing     Problem: Pain - Standard  Goal: Alleviation of pain or a reduction in pain to the patient’s comfort goal  Outcome: Progressing  Note: Pain in jaw/neck managed with medications per MAR

## 2023-08-15 NOTE — THERAPY
Physical Therapy   Initial Evaluation     Patient Name: Richard Hubbard II  Age:  48 y.o., Sex:  male  Medical Record #: [id number]  Today's Date: 8/15/2023     Precautions  Precautions: Fall Risk  Comments: L BKA    Assessment  Patient is 48 y.o. male who was recently admitted for SOB, acute diastolic HF, and elevated liver function tests. Pt was agreeable to therapy evaluation and presented with impaired balance, weakness, pain, and dec activity tolerance. At baseline pt is typically indep with bed mobility, sit<>stands, transfers to w/c, and w/c mobility. Pt states he typically uses his prosthesis for transfers, however, does not have it with him in the hospital today. Pt was only able to tolerate 1 sit<>stand and requested to go back to bed due to high pain level. Anticipate pt to progress with mobility and return back to baseline once pain is medically managed. Due to pain pt has been immobile and deconditioned. If pt continues to be in pain and limited in mobility pt may benefit from post acute therapy prior to d/c home. Will continue to follow and update plan of care.     Plan    Physical Therapy Initial Treatment Plan   Treatment Plan : (P) Bed Mobility, Equipment, Gait Training, Manual Therapy, Neuro Re-Education / Balance, Self Care / Home Evaluation, Therapeutic Activities, Therapeutic Exercise  Treatment Frequency: (P) 3 Times per Week  Duration: (P) Until Therapy Goals Met    DC Equipment Recommendations: (P) Unable to determine at this time  Discharge Recommendations: (P) Other - (if pain improves anticipate pt to d/c home with HH therapy, however, if pain continues to be limiting factor pt may require post acute therapy services)     Objective       08/15/23 1542   Initial Contact Note    Initial Contact Note Order Received and Verified, Physical Therapy Evaluation in Progress with Full Report to Follow.   Precautions   Precautions Fall Risk   Comments L BKA   Vitals   O2 (LPM) 1   O2 Delivery  Device Silicone Nasal Cannula   Pain 0 - 10 Group   Location Jaw;Shoulder   Location Orientation Right   Description Aching   Therapist Pain Assessment Prior to Activity;During Activity;Post Activity;Nurse Notified;8   Prior Living Situation   Housing / Facility 1 Story House   Steps Into Home 2  (states of ramp)   Steps In Home 0   Equipment Owned 4-Wheel Walker;Wheelchair   Lives with - Patient's Self Care Capacity Spouse;Child Less than 18 Years of Age   Comments pt states spouse is supportive and can assist upon d/c to home   Prior Level of Functional Mobility   Bed Mobility Independent   Transfer Status Independent   Ambulation Independent   Ambulation Distance   (very short distances with use of 4WW and stump on seat, tends to hop a few steps only)   Assistive Devices Used Wheelchair   Wheelchair Independent   Comments pt states he primairly transfer to w/c and uses w/c for main mode of mobility, can use 4WW for short distances. Pt states he can only use prosthesis for transfers only and not for ambulation due to some issues with fitting   Cognition    Cognition / Consciousness WDL   Comments cooperative, however, mainly focused on pain level and frustrated with pain   Passive ROM Lower Body   Passive ROM Lower Body WDL   Active ROM Lower Body    Active ROM Lower Body  WDL   Strength Lower Body   Lower Body Strength  X   Gross Strength Generalized Weakness, Equal Bilaterally   Comments presents with functional strength, however limited due to pain   Sensation Lower Body   Lower Extremity Sensation   WDL   Lower Body Muscle Tone   Lower Body Muscle Tone  WDL   Neurological Concerns   Neurological Concerns No   Coordination Upper Body   Coordination Not Tested   Coordination Lower Body    Coordination Lower Body  Not Tested   Balance Assessment   Sitting Balance (Static) Fair +   Sitting Balance (Dynamic) Fair   Standing Balance (Static) Fair -   Standing Balance (Dynamic) Poor +   Weight Shift Sitting Fair    Weight Shift Standing Absent  (L BKA, prosthesis not in room)   Comments w/fww use   Bed Mobility    Supine to Sit Contact Guard Assist   Sit to Supine Standby Assist   Scooting Standby Assist   Comments HOB elevated and rails up   Gait Analysis   Gait Level Of Assist Unable to Participate   Functional Mobility   Sit to Stand Minimal Assist   Bed, Chair, Wheelchair Transfer Refused   Mobility EOB, sit<>stand, back to bed   How much difficulty does the patient currently have...   Turning over in bed (including adjusting bedclothes, sheets and blankets)? 3   Sitting down on and standing up from a chair with arms (e.g., wheelchair, bedside commode, etc.) 1   Moving from lying on back to sitting on the side of the bed? 3   How much help from another person does the patient currently need...   Moving to and from a bed to a chair (including a wheelchair)? 2   Need to walk in a hospital room? 1   Climbing 3-5 steps with a railing? 1   6 clicks Mobility Score 11   Activity Tolerance   Sitting in Chair refused   Sitting Edge of Bed 10 mins   Standing 30 seconds   Comments limited by pain   Edema / Skin Assessment   Edema / Skin  Not Assessed   Patient / Family Goals    Patient / Family Goal #1 to go home   Short Term Goals    Short Term Goal # 1 pt will go sit<>stand w/LRD w/SPV in 6tx for safe d/c home   Short Term Goal # 2 pt will transfer bed<>chair w/LRD w/spv in 6tx   Short Term Goal # 3 pt will mobilize in w/c for 150ft w/spv in 6tx for safe d/c home   Education Group   Education Provided Role of Physical Therapist   Role of Physical Therapist Patient Response Patient;Acceptance;Explanation;Demonstration;Verbal Demonstration;Action Demonstration   Physical Therapy Initial Treatment Plan    Treatment Plan  Bed Mobility;Equipment;Gait Training;Manual Therapy;Neuro Re-Education / Balance;Self Care / Home Evaluation;Therapeutic Activities;Therapeutic Exercise   Treatment Frequency 3 Times per Week   Duration Until  Therapy Goals Met   Problem List    Problems Pain;Impaired Bed Mobility;Impaired Transfers;Functional Strength Deficit;Impaired Balance;Decreased Activity Tolerance   Anticipated Discharge Equipment and Recommendations   DC Equipment Recommendations Unable to determine at this time   Discharge Recommendations Other -  (if pain improves anticipate pt to d/c home with HH therapy, however, if pain continues to be limiting factor pt may require post acute therapy services)   Interdisciplinary Plan of Care Collaboration   IDT Collaboration with  Nursing   Patient Position at End of Therapy In Bed;Call Light within Reach;Tray Table within Reach;Phone within Reach   Collaboration Comments aware of visit and recs   Session Information   Date / Session Number  8/15-1 (1/3, 8/21)

## 2023-08-15 NOTE — PROGRESS NOTES
Telemetry Shift Summary     Rhythm SR 1st degree with BBB  HR Range 79-85  Ectopy rPVC  Measurements  0.24/0.12/0.40     Normal Values  Rhythm SR  HR Range    Measurements 0.12-0.20 / 0.06-0.10  / 0.30-0.52

## 2023-08-15 NOTE — PROGRESS NOTES
"Hospital Medicine Daily Progress Note    Date of Service  8/15/2023    Chief Complaint  Richard Hubbard II is a 48 y.o. male admitted 8/12/2023 with SOB    Hospital Course  Per notes, \"48 y.o. male who presented 8/12/2023 with worsening SOB Hx of RA on humira known previous hx of mild AI 2019 , hx of paroxysmal afib s/p cardioversion in 2019, not anticoagulated, BKA on the left now with severe   [facility] with cellulitis of the right leg and an echo was done at that time showed severe AR, moderate AS and mild to moderate MR.  On ant leaflet on the Mitral is calcified mass 3 cm.     CXR c/w pulm edema   , creatinine 2.66, lfts elevated, pro BNP 61554  EKG pt now has LBBB which is new     I discussed the plan of care with   D/w DR. Ivey from cardiology the ECHO findings of 3 weeks ago and pt status, recommends no need to transfer to [facility] at this time   Optimize cardiac status with diuresis and if worsens to transfer to [facility] for further evaluation by cardiology     In ER he received 300 cc of fluid, vanc and zosyn     Pt being admitted to [facility] ICU      8/13: ECHO yesterday is different readings than the [facility] ECHO  \"Normal left ventricular chamber size.  The left ventricular ejection fraction is visually estimated to be 55%.  Normal right ventricular size and systolic function.  Moderate mitral regurgitation.  Moderate aortic valve stenosis.  Vmax is3.6  m/s.  Moderate aortic insufficiency.  Estimated right ventricular systolic pressure is 50 mmHg.\"     AI is quantified as moderate now  Mitral calcified mass is read as possible atrial mass  Diastolic heart failure Grade I  Do think pt weill need a MIA  Consult rounding cardiologist on Monday  Pt feels better with diuresis.\"    Interval Problem Update  Per notes, \"8/14: Patient seen at bedside this morning.  Overall the patient feels better.  I did consult and discussed case with cardiology with Dr. Clements who " "recommends only to continue with diuresis for now.  If the patient decompensates the patient would potentially require transfer to [facility] for further management and care cardiology.  We have ordered right upper quadrant ultrasound to evaluate for liver function test, however I suspect this is most likely due to cardiac congestion we will continue with diuresis for now.  I have also ordered hepatitis panel.  Patient denying abdominal pain at this time.    --UPDATE: Worsening AST, hepatitis panel pending as well as RUQ-US. We have consulted GI, we appreciate further recommendations.\"    Patient complaining of particular joint pain.  LFTs stable but not improving.  Lower extremity weakness and oxygen use improving.    I have discussed this patient's plan of care and discharge plan at IDT rounds today with Case Management, Nursing, Nursing leadership, and other members of the IDT team.    Consultants/Specialty  cardiology - Dr Clements    Code Status  Full Code    Disposition  The patient is not medically cleared for discharge to home or a post-acute facility.  Anticipate discharge to: home with close outpatient follow-up      Review of Systems  Review of Systems   Constitutional:  Positive for malaise/fatigue. Negative for chills and fever.   HENT:  Negative for hearing loss and nosebleeds.    Eyes:  Negative for blurred vision and double vision.   Respiratory:  Positive for shortness of breath.    Cardiovascular:  Positive for leg swelling. Negative for chest pain and palpitations.   Gastrointestinal:  Negative for abdominal pain, heartburn and vomiting.   Genitourinary:  Negative for dysuria and urgency.   Musculoskeletal:  Negative for back pain and falls.   Skin:  Negative for itching and rash.   Neurological:  Negative for dizziness and headaches.   Psychiatric/Behavioral:  Negative for substance abuse. The patient is not nervous/anxious.    All other systems reviewed and are negative.       Physical Exam  Temp:  " [36.2 °C (97.2 °F)-36.7 °C (98 °F)] (P) 36.6 °C (97.9 °F)  Pulse:  [77-82] (P) 77  Resp:  [16-18] (P) 16  BP: ()/(51-54) (P) 90/48  SpO2:  [90 %-97 %] (P) 97 %    Physical Exam  Vitals and nursing note reviewed.   Constitutional:       Appearance: He is obese. He is ill-appearing.   HENT:      Head: Normocephalic and atraumatic.   Cardiovascular:      Rate and Rhythm: Normal rate and regular rhythm.      Heart sounds: Murmur heard.   Pulmonary:      Breath sounds: Rales present.      Comments: On supplemental oxygen  Abdominal:      General: Abdomen is flat. Bowel sounds are normal. There is no distension.      Palpations: Abdomen is soft.      Tenderness: There is no abdominal tenderness.   Musculoskeletal:      Cervical back: Normal range of motion and neck supple.      Right lower leg: Edema present.      Comments: Left BKA   Skin:     General: Skin is warm.   Neurological:      General: No focal deficit present.      Mental Status: He is alert and oriented to person, place, and time.         Fluids    Intake/Output Summary (Last 24 hours) at 8/15/2023 1553  Last data filed at 8/15/2023 1200  Gross per 24 hour   Intake 1400 ml   Output 5600 ml   Net -4200 ml       Laboratory  Recent Labs     08/13/23  0110 08/15/23  0103   WBC 9.1 6.4   RBC 3.27* 3.51*   HEMOGLOBIN 9.4* 9.9*   HEMATOCRIT 28.2* 30.3*   MCV 86.2 86.3   MCH 28.7 28.2   MCHC 33.3 32.7   RDW 45.5 46.9   PLATELETCT 227 211   MPV 10.4 10.0     Recent Labs     08/14/23  0528 08/14/23  1205 08/15/23  0103   SODIUM 128* 129* 129*   POTASSIUM 3.2* 2.8* 3.2*   CHLORIDE 92* 90* 90*   CO2 23 27 28   GLUCOSE 90 96 86   BUN 34* 30* 24*   CREATININE 1.88* 1.68* 1.28   CALCIUM 7.6* 8.0* 7.6*     Recent Labs     08/15/23  0103   INR 1.22*               Imaging  US-LIVER AND VESSELS COMPLETE (COMBO)   Final Result      Grossly unremarkable liver doppler but evaluation is very limited due to body habitus and poor breath-hold.      EC-ECHOCARDIOGRAM COMPLETE W/O  "CONT   Final Result      DX-CHEST-PORTABLE (1 VIEW)   Final Result      1.  Diffuse mixed interstitial and airspace opacities with enlarged cardiac silhouette could be related to edema, however multifocal pneumonia is not excluded.           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 They are recommending to continue diuresis for now.    Elevated liver function tests  Assessment & Plan  Cardiac congestion? Continue with diuresis  Denying abdominal pain  Hepatitis panel negative for acute findings.  US-RUQ negative for acute findings    Cardiorenal syndrome- (present on admission)  Assessment & Plan  Continue diuresis  Creatinine continues to improve  If worsening we will consider consulting nephrology    Hyponatremia- (present on admission)  Assessment & Plan  In the setting of volume overload  Monitor  improving    Generalized weakness  Assessment & Plan  PT/OT eval and treat    Chronic pain syndrome- (present on admission)  Assessment & Plan  Continue home pain medications    Rheumatoid arthritis (HCC)- (present on admission)  Assessment & Plan  Hx of  Patient on Humira  Both ESR and CRP elevated .  Prednisone 40 mg daily  Pain " management with home morphine           VTE prophylaxis: heparin    I have performed a physical exam and reviewed and updated ROS and Plan today (8/15/2023). In review of yesterday's note (8/14/2023), there are no changes except as documented above.    Total time spent with patient over 53 minutes.  This included my review with nursing and ancillary staff, review of records, face to face interview, physical examination; my review of lab results (CBC, chemistry panel), imaging review (CXR) and ECG.   In addition, counseling patient and speaking with consultants.

## 2023-08-15 NOTE — PROGRESS NOTES
Telemetry Shift Summary     Rhythm: SR  Rate: 76-84  Measurements: .22/.10/.44  Ectopy (reported by Monitor Tech): r-o PVC     Normal Values  Rhythm: Sinus  HR:   Measurements: 0.12-0.20/0.06-0.10/0.30-0.52

## 2023-08-15 NOTE — PROGRESS NOTES
Denisse from Lab called with critical result of AST 1126 at 0240. Critical lab result read back to Denisse.   Dr. Hurtado notified of critical lab result at 0256.  Critical lab result read back by Dr. Hurtado.

## 2023-08-15 NOTE — PROGRESS NOTES
Gastroenterology Initial Consult Note               Author:  Tarik Moreno M.D. with MARTÍN Kohler Date & Time Created: 8/15/2023 1:32 PM       Patient ID:  Name:             Richard Hubbard  YOB: 1975  Age:                 48 y.o.  male  MRN:               [id number]      Referring Provider:  You King MD      Presenting Chief Complaint:  Elevated liver enzymes      History of Present Illness:    This is a very pleasant 48 y.o. male with the past medical history that includes bipolar disorder, Lucila-en-Y gastric bypass surgery, rheumatoid arthritis on Humira, GERD, eczema, ADD, paroxysmal atrial fibrillation status cardiovascular seen in 2019, below the knee amputation who is seen in consultation for elevated liver enzymes.  He was admitted on 8/12/2023 with complaints of shortness of breath and weakness. On this admission here, noted to have sodium 120, creatinine 2.66 initially.  CMP most recent with sodium 129, potassium 2.8, chloride 90, BUN 30, creatinine 1.68, calcium 8, AST 1489, 616 ALT, , T. bili 1.2.  Initial BNP over 25,000.  He has been diagnosed by the primary team with acute diastolic heart failure.  It seems he might be a candidate for valve replacement surgery, but no current plans for this.  He is being diuresed.    He was just recently hospitalized at an outside facility [facility] on 7/16/2023 with findings of cellulitis of the right leg.  Echocardiogram was done at that time which showed severe aortic regurgitation, moderate aortic stenosis, and mild to moderate mitral regurgitation.  Treated with IV Unasyn at the outside facility and then this was transitioned to Augmentin on 7/20/2023 for 11 days.     Looking back at historical data to 2019 within the system he has had intermittently elevated AST and ALT with mild elevation.  Alkaline phosphatase more recently has increased up to 400.  Bilirubin has been normal in the past.  He also had an  ultrasound from 2019 which demonstrated a possible dilated portal vein.    Interval History  8/15/2023: AST 1126, , , Tbili 0.9. Overall feels well. No new changes. US liver and vessels negative including normal appearing portal vein although limited eval due to body habitus and patient participation.     Review of Systems:  Review of Systems   Constitutional:  Positive for malaise/fatigue. Negative for chills and fever.   Respiratory:  Positive for shortness of breath. Negative for cough and wheezing.    Cardiovascular:  Negative for chest pain and leg swelling.   Gastrointestinal:  Negative for abdominal pain, blood in stool, melena, nausea and vomiting.   Musculoskeletal:  Positive for joint pain and myalgias. Negative for neck pain.   Neurological:  Positive for weakness. Negative for dizziness.   Psychiatric/Behavioral:  Negative for hallucinations. The patient does not have insomnia.              Past Medical History:  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    Hemorrhagic disorder (HCC)     hx of bleeding ulcers    History of bleeding ulcers 2/12/2015    Hypertension 05/03/2021    pt states well controlled on meds    Insomnia 5/21/2009    Migraine 5/21/2009    Morning headache     Obesity, morbid (HCC) 5/21/2009    Osteomyelitis (HCC) 10/7/2019    Pain 09/2019    Chronic knee and back pain    Pubic ramus fracture  (HCC) 9/28/2019    Rheumatoid arteritis ([facility]) 09/2019    knee, feet right hand    Sleep apnea 5/21/2009    Did not tolerate cpap, does not use it    Snoring      Active Hospital Problems    Diagnosis     Hypokalemia [E87.6]     Acute diastolic heart failure ([facility]) [I50.31]     Cardiorenal syndrome [I13.10]     Elevated liver function tests [R79.89]     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]     KATERYNA (acute kidney injury) ([facility]) [N17.9]     Hyponatremia [E87.1]     Generalized weakness [R53.1]     Chronic pain syndrome [G89.4]     Rheumatoid arthritis ([facility]) [M06.9]          Past Surgical History:  Past Surgical History:   Procedure Laterality Date    KNEE AMPUTATION BELOW Left 3/9/2023    Procedure: AMPUTATION, BELOW KNEE;  Surgeon: Wayne Chambers M.D.;  [place];  Service: Orthopedics    PB TOTAL KNEE ARTHROPLASTY Right 7/22/2020    Procedure: ARTHROPLASTY, KNEE, TOTAL;  Surgeon: Temo Pires M.D.;  Location: [facility];  Service: Orthopedics    TENDON TRANSFER Right 1/22/2020    Procedure: RIGHT DISTAL ULNA RESECTION AND EXTENSOR TENDON TRANSFER;  Surgeon: Marine Rushing M.D.;  Location: [facility];  Service: Orthopedics    OTHER ORTHOPEDIC SURGERY Right 2020    total right knee replacement    IRRIGATION & DEBRIDEMENT ORTHO Left 10/9/2019    Procedure: IRRIGATION AND DEBRIDEMENT, WOUND - PELVIC OSTEOMYELITIS;  Surgeon: You Olivares M.D.;  Location: [facility];  Service: Orthopedics    OTHER SURGICAL PROCEDURE  2019    osteomelitis of pelvis cleaned    GASTRIC BYPASS LAPAROSCOPIC  2000    Lucila en y    CHOLECYSTECTOMY  2000    OTHER SURGICAL PROCEDURE      repair of broken penis           Hospital Medications:  Current Facility-Administered Medications   Medication Dose Frequency Provider Last Rate Last Admin    ondansetron (Zofran) syringe/vial injection 4 mg  4 mg Q4HRS WING Menezes M.D.   4 mg at 08/13/23 1814    senna-docusate (Pericolace  Or Senokot S) 8.6-50 MG per tablet 2 Tablet  2 Tablet BID Sampson Menezes M.D.   2 Tablet at 08/13/23 1746    And    polyethylene glycol/lytes (Miralax) PACKET 1 Packet  1 Packet QDAY PRSTEPHEN Menezes M.D.   1 Packet at 08/13/23 1754    And    magnesium hydroxide (Milk Of Magnesia) suspension 30 mL  30 mL QDAY PRN Sampson Menezes M.D.        And    bisacodyl (Dulcolax) suppository 10 mg  10 mg QDAY PRN Sampson Menezes M.D.        heparin injection 5,000 Units  5,000 Units Q8HRS Sampson Menezes M.D.   5,000 Units at 08/15/23 1318    furosemide (Lasix) injection 40 mg  40 mg BID DIURETIC Sampson Menezes M.D.   40 mg at 08/15/23 0527    morphine ER (Ms Contin) tablet 15 mg  15 mg BID Sampson Menezes M.D.   15 mg at 08/15/23 0527    morphine (MS IR) tablet 30 mg  30 mg 4X/DAY Sampson Menezes M.D.   30 mg at 08/15/23 1318    DULoxetine (Cymbalta) capsule 60 mg  60 mg DAILY Sampson Menezes M.D.   60 mg at 08/15/23 0527   Last reviewed on 8/12/2023  2:40 PM by Gael Lazo       Current Outpatient Medications:  Medications Prior to Admission   Medication Sig Dispense Refill Last Dose    CALCIUM PO Take 1 Tablet by mouth every day.   UNK at K    VITAMIN D PO Take 1 Tablet by mouth every day.   UNK at UNK    Adalimumab (HUMIRA PEN) 40 MG/0.4ML Pen-injector Kit INJECT 40MG SUBCUTANEOUSLY  EVERY 2 WEEKS 2 Each 4 8/10/2023 at K    pregabalin (LYRICA) 200 MG capsule Take 200 mg by mouth every day.   8/9/2023 at UNK    morphine ER (MS CONTIN) 15 MG Tab CR tablet Take 15 mg by mouth 2 times a day.   8/11/2023 at UNK    morphine (MS IR) 30 MG tablet Take 30 mg by mouth 4 times a day.   8/11/2023 at UNK    albuterol 108 (90 Base) MCG/ACT Aero Soln inhalation aerosol Inhale 2 Puffs every 6 hours as needed for Shortness of Breath. 8.5 g 0 PRN at PRN    DULoxetine (CYMBALTA) 60 MG Cap DR Particles delayed-release capsule Take 1 Capsule by mouth every day. 30 Capsule 2  "2023 at UNK    Naloxone (NARCAN) 4 MG/0.1ML Liquid One spray in one nostril for overdose and call 911. 1 Package 0 PRN at PRN         Medication Allergies:  Allergies   Allergen Reactions    Nsaids      Bleeding, \"I had a bleeding ulcer\"    Diphenhydramine Unspecified     \"I get agitated\"    Mirtazapine Unspecified     I had a hard time waking up, lacking mental focus.     Penicillins      \"Had some dizziness\"  Tolerated Unasyn 10/2019  Tolerated Zosyn 2023    Promethazine Unspecified     \"I get very agitated\"         Family Medical History:  Family History   Problem Relation Age of Onset    Hypertension Mother     Arthritis Mother     Depression Mother     Cancer Father         bladder    Schizophrenia Father     Cancer Maternal Grandmother         breast    Heart Disease Maternal Grandmother     Psychiatric Illness Other     Stroke Maternal Aunt     Other Neg Hx         no connective tissue disorders or known aortic disease         Social History:  Social History     Socioeconomic History    Marital status:      Spouse name: Not on file    Number of children: Not on file    Years of education: Not on file    Highest education level: Bachelor's degree (e.g., BA, AB, BS)   Occupational History    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   Tobacco Use    Smoking status: Every Day     Packs/day: 0.25     Types: Cigarettes, Cigars     Last attempt to quit: 2023     Years since quittin.5     Passive exposure: Current    Smokeless tobacco: Never    Tobacco comments:     occasional cigar   Vaping Use    Vaping Use: Never used   Substance and Sexual Activity    Alcohol use: No     Alcohol/week: 0.0 oz     Comment: quit 2011 ago- past ETOH abuse    Drug use: No    Sexual activity: Yes     Partners: Female     Comment: ;    Other Topics Concern     Service No    Blood Transfusions Yes    Caffeine Concern No    Occupational Exposure No    Hobby Hazards No    Sleep Concern No    Stress Concern Yes    " "Weight Concern Yes    Special Diet No    Back Care No    Exercise No    Bike Helmet No    Seat Belt Yes    Self-Exams Yes   Social History Narrative    , lives in [place]     Social Determinants of Health     Financial Resource Strain: Low Risk  (2/28/2023)    Overall Financial Resource Strain (CARDIA)     Difficulty of Paying Living Expenses: Not hard at all   Food Insecurity: No Food Insecurity (2/28/2023)    Hunger Vital Sign     Worried About Running Out of Food in the Last Year: Never true     Ran Out of Food in the Last Year: Never true   Transportation Needs: No Transportation Needs (3/27/2023)    PRAPARE - Transportation     Lack of Transportation (Medical): No     Lack of Transportation (Non-Medical): No   Physical Activity: Inactive (2/28/2023)    Exercise Vital Sign     Days of Exercise per Week: 0 days     Minutes of Exercise per Session: 0 min   Stress: Stress Concern Present (2/28/2023)    [employer] - Occupational Stress Questionnaire     Feeling of Stress : To some extent   Social Connections: Socially Integrated (2/28/2023)    Social Connection and Isolation Panel [NHANES]     Frequency of Communication with Friends and Family: More than three times a week     Frequency of Social Gatherings with Friends and Family: Once a week     Attends Evangelical Services: More than 4 times per year     Active Member of Clubs or Organizations: Yes     Attends Club or Organization Meetings: More than 4 times per year     Marital Status:    Intimate Partner Violence: Not on file   Housing Stability: Low Risk  (2/28/2023)    Housing Stability Vital Sign     Unable to Pay for Housing in the Last Year: No     Number of Places Lived in the Last Year: 1     Unstable Housing in the Last Year: No         Vital signs:  Weight/BMI: Body mass index is 29.96 kg/m².  BP 97/51   Pulse 79   Temp 36.6 °C (97.8 °F) (Oral)   Resp 18   Ht 1.778 m (5' 10\")   Wt 94.7 kg (208 lb 12.4 oz)  "  SpO2 96%   Vitals:    08/15/23 0039 08/15/23 0316 08/15/23 0709 08/15/23 1139   BP: 106/53 105/52 100/54 97/51   Pulse: 82 79 77 79   Resp: 18 18 18 18   Temp: 36.6 °C (97.9 °F) 36.4 °C (97.5 °F) 36.2 °C (97.2 °F) 36.6 °C (97.8 °F)   TempSrc:  Oral Axillary Oral   SpO2: 90% 94% 97% 96%   Weight:  94.7 kg (208 lb 12.4 oz)     Height:         Oxygen Therapy:  Pulse Oximetry: 96 %, O2 (LPM): 2, O2 Delivery Device: Silicone Nasal Cannula    Intake/Output Summary (Last 24 hours) at 8/15/2023 1332  Last data filed at 8/15/2023 1200  Gross per 24 hour   Intake 1400 ml   Output 5600 ml   Net -4200 ml           Physical Exam:  Physical Exam  Vitals and nursing note reviewed.   Constitutional:       Appearance: He is ill-appearing.   HENT:      Head: Normocephalic and atraumatic.      Right Ear: External ear normal.      Left Ear: External ear normal.      Nose: Nose normal.      Mouth/Throat:      Mouth: Mucous membranes are moist.      Pharynx: Oropharynx is clear.   Eyes:      General: No scleral icterus.     Extraocular Movements: Extraocular movements intact.      Pupils: Pupils are equal, round, and reactive to light.   Cardiovascular:      Rate and Rhythm: Normal rate and regular rhythm.      Pulses: Normal pulses.      Heart sounds: Murmur (Systolic and diastolic) heard.   Pulmonary:      Effort: Pulmonary effort is normal. No respiratory distress.      Breath sounds: Normal breath sounds.   Abdominal:      General: Abdomen is flat. Bowel sounds are normal.      Palpations: Abdomen is soft.      Hernia: A hernia (Mid-abdominal incisional) is present.   Musculoskeletal:         General: No tenderness or signs of injury.      Cervical back: Neck supple.   Lymphadenopathy:      Cervical: No cervical adenopathy.   Skin:     General: Skin is warm and dry.      Coloration: Skin is pale.   Neurological:      General: No focal deficit present.      Mental Status: He is alert and oriented to person, place, and time.    Psychiatric:         Thought Content: Thought content normal.         Judgment: Judgment normal.           Labs:  Recent Labs     08/12/23  1740 08/13/23  0110 08/13/23  0600 08/13/23  0937 08/14/23  0528 08/14/23  1205 08/15/23  0103   SODIUM 119*   < > 122*   < > 128* 129* 129*   POTASSIUM 5.1   < > 5.1   < > 3.2* 2.8* 3.2*   CHLORIDE 87*   < > 90*   < > 92* 90* 90*   CO2 16*   < > 18*   < > 23 27 28   BUN 36*   < > 39*   < > 34* 30* 24*   CREATININE 2.63*   < > 2.68*   < > 1.88* 1.68* 1.28   MAGNESIUM 2.3  --  2.3  --   --   --  1.6   PHOSPHORUS  --   --   --   --   --   --  2.6   CALCIUM 8.0*   < > 7.6*   < > 7.6* 8.0* 7.6*    < > = values in this interval not displayed.       Recent Labs     08/14/23  0528 08/14/23  1205 08/15/23  0103   ALTSGPT 498* 616* 577*   ASTSGOT 1119* 1489* 1126*   ALKPHOSPHAT 218* 247* 226*   TBILIRUBIN 0.9 1.2 0.9   GLUCOSE 90 96 86       Recent Labs     08/12/23  1348 08/13/23  0110 08/13/23  0600 08/14/23  0528 08/14/23  1205 08/15/23  0103   WBC 11.2* 9.1  --   --   --  6.4   NEUTSPOLYS 75.00*  --   --   --   --  68.40   LYMPHOCYTES 17.00*  --   --   --   --  21.80*   MONOCYTES 7.40  --   --   --   --  8.50   EOSINOPHILS 0.00  --   --   --   --  0.60   BASOPHILS 0.10  --   --   --   --  0.20   ASTSGOT 694* 517*   < > 1119* 1489* 1126*   ALTSGPT 242* 240*   < > 498* 616* 577*   ALKPHOSPHAT 311* 245*   < > 218* 247* 226*   TBILIRUBIN 2.8* 1.7*   < > 0.9 1.2 0.9    < > = values in this interval not displayed.       Recent Labs     08/12/23  1348 08/13/23  0110 08/15/23  0103   RBC 3.67* 3.27* 3.51*   HEMOGLOBIN 10.5* 9.4* 9.9*   HEMATOCRIT 32.1* 28.2* 30.3*   PLATELETCT 265 227 211   PROTHROMBTM  --   --  16.0*   INR  --   --  1.22*       Recent Results (from the past 24 hour(s))   CBC WITH DIFFERENTIAL    Collection Time: 08/15/23  1:03 AM   Result Value Ref Range    WBC 6.4 4.8 - 10.8 K/uL    RBC 3.51 (L) 4.70 - 6.10 M/uL    Hemoglobin 9.9 (L) 14.0 - 18.0 g/dL    Hematocrit 30.3  (L) 42.0 - 52.0 %    MCV 86.3 81.4 - 97.8 fL    MCH 28.2 27.0 - 33.0 pg    MCHC 32.7 32.3 - 36.5 g/dL    RDW 46.9 35.9 - 50.0 fL    Platelet Count 211 164 - 446 K/uL    MPV 10.0 9.0 - 12.9 fL    Neutrophils-Polys 68.40 44.00 - 72.00 %    Lymphocytes 21.80 (L) 22.00 - 41.00 %    Monocytes 8.50 0.00 - 13.40 %    Eosinophils 0.60 0.00 - 6.90 %    Basophils 0.20 0.00 - 1.80 %    Immature Granulocytes 0.50 0.00 - 0.90 %    Nucleated RBC 0.00 0.00 - 0.20 /100 WBC    Neutrophils (Absolute) 4.37 1.82 - 7.42 K/uL    Lymphs (Absolute) 1.39 1.00 - 4.80 K/uL    Monos (Absolute) 0.54 0.00 - 0.85 K/uL    Eos (Absolute) 0.04 0.00 - 0.51 K/uL    Baso (Absolute) 0.01 0.00 - 0.12 K/uL    Immature Granulocytes (abs) 0.03 0.00 - 0.11 K/uL    NRBC (Absolute) 0.00 K/uL   Comp Metabolic Panel    Collection Time: 08/15/23  1:03 AM   Result Value Ref Range    Sodium 129 (L) 135 - 145 mmol/L    Potassium 3.2 (L) 3.6 - 5.5 mmol/L    Chloride 90 (L) 96 - 112 mmol/L    Co2 28 20 - 33 mmol/L    Anion Gap 11.0 7.0 - 16.0    Glucose 86 65 - 99 mg/dL    Bun 24 (H) 8 - 22 mg/dL    Creatinine 1.28 0.50 - 1.40 mg/dL    Calcium 7.6 (L) 8.4 - 10.2 mg/dL    Correct Calcium 8.9 8.5 - 10.5 mg/dL    AST(SGOT) 1126 (HH) 12 - 45 U/L    ALT(SGPT) 577 (H) 2 - 50 U/L    Alkaline Phosphatase 226 (H) 30 - 99 U/L    Total Bilirubin 0.9 0.1 - 1.5 mg/dL    Albumin 2.4 (L) 3.2 - 4.9 g/dL    Total Protein 6.3 6.0 - 8.2 g/dL    Globulin 3.9 (H) 1.9 - 3.5 g/dL    A-G Ratio 0.6 g/dL   MAGNESIUM    Collection Time: 08/15/23  1:03 AM   Result Value Ref Range    Magnesium 1.6 1.5 - 2.5 mg/dL   PHOSPHORUS    Collection Time: 08/15/23  1:03 AM   Result Value Ref Range    Phosphorus 2.6 2.5 - 4.5 mg/dL   Sed Rate    Collection Time: 08/15/23  1:03 AM   Result Value Ref Range    Sed Rate Westergren 40 (H) 0 - 20 mm/hour   CRP QUANTITIVE (NON-CARDIAC)    Collection Time: 08/15/23  1:03 AM   Result Value Ref Range    Stat C-Reactive Protein 6.85 (H) 0.00 - 0.75 mg/dL   Prothrombin  Time    Collection Time: 08/15/23  1:03 AM   Result Value Ref Range    PT 16.0 (H) 12.0 - 14.6 sec    INR 1.22 (H) 0.87 - 1.13   ESTIMATED GFR    Collection Time: 08/15/23  1:03 AM   Result Value Ref Range    GFR (CKD-EPI) 69 >60 mL/min/1.73 m 2         Radiology Review:  US-LIVER AND VESSELS COMPLETE (COMBO)   Final Result      Grossly unremarkable liver doppler but evaluation is very limited due to body habitus and poor breath-hold.      EC-ECHOCARDIOGRAM COMPLETE W/O CONT   Final Result      DX-CHEST-PORTABLE (1 VIEW)   Final Result      1.  Diffuse mixed interstitial and airspace opacities with enlarged cardiac silhouette could be related to edema, however multifocal pneumonia is not excluded.            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 He is now admitted to this facility 8/12/2023 with acute diastolic heart failure, cardiorenal syndrome, and elevated liver enzymes.  Suspicion for acute hepatic congestion versus ischemic hepatitis versus drug-induced liver injury versus flare of an underlying liver issue such as autoimmune hepatitis. He  denies alcohol use    Assessment:  -Elevated liver enzymes  -Acute diastolic heart failure  -Cardiorenal syndrome  -Mitral valve versus atrial mass  -A-fib rheumatoid arthritis on Humira   -Cellulitis right lower extremity cellulitis so status post Augmentin    Plan:    -Await labs:AVANI, ASMA, IgG, AMA, Ceruloplasmin, Alpha-1 antitrypsin  -Avoid hepatotoxins  -Daily CMP, INR  -Continue optimization of patient's cardiovascular status    GI Attending -    Patient seen, examined, chart reviewed and case discussed and plan arrived at with A.P.R.N.  Agree with this note.      Tarik Moreno M.D.    Francois Crisostomo, [email or web address].      Thank you for inviting me to participate in the care of this patient. Please do not hesitate to call GI consultants with additional questions/concerns or changes in the patient's clinical status at [phone].      Core Quality Measures   Reviewed items:  Labs, Medications and Radiology reports reviewed

## 2023-08-15 NOTE — CARE PLAN
The patient is Stable - Low risk of patient condition declining or worsening    Shift Goals  Clinical Goals: pain management, diuresis  Patient Goals: get prednisone ordered  Family Goals: Unable to assess    Progress made toward(s) clinical / shift goals:  Patient reported consistent generalized pain. Medicated per MAR. Elevated liver enzymes, GI c/s. NPO at midnight for abd US.   Problem: Knowledge Deficit - Standard  Goal: Patient and family/care givers will demonstrate understanding of plan of care, disease process/condition, diagnostic tests and medications  8/14/2023 1914 by Nikia Joshua R.N.  Outcome: Progressing  8/14/2023 1912 by Nikia Joshua R.N.  Outcome: Progressing     Problem: Fall Risk  Goal: Patient will remain free from falls  8/14/2023 1914 by BUDDY Brice.STEPHEN.  Outcome: Progressing  8/14/2023 1912 by BUDDY Brice.N.  Outcome: Progressing     Problem: Skin Integrity  Goal: Skin integrity is maintained or improved  8/14/2023 1914 by BUDDY Brice.STEPHEN.  Outcome: Progressing  8/14/2023 1912 by BUDDY Brice.N.  Outcome: Progressing     Problem: Pain - Standard  Goal: Alleviation of pain or a reduction in pain to the patient’s comfort goal  8/14/2023 1914 by BUDDY Brice.N.  Outcome: Progressing  8/14/2023 1912 by Nikia Joshua R.N.  Outcome: Progressing     Problem: Hemodynamics  Goal: Patient's hemodynamics, fluid balance and neurologic status will be stable or improve  8/14/2023 1914 by BUDDY Brice.N.  Outcome: Progressing  8/14/2023 1912 by BUDDY Brice.N.  Outcome: Progressing     Problem: Care Map:  Admission Optimal Outcome for the Heart Failure Patient  Goal: Admission:  Optimal Care of the heart failure patient  8/14/2023 1914 by Nikia Joshua R.N.  Outcome: Progressing  8/14/2023 1912 by BUDDY Brice.N.  Outcome: Progressing     Problem: Care Map:  Day 1 Optimal Outcome for the Heart Failure Patient  Goal: Day 1:  Optimal Care of the heart failure patient  8/14/2023 1914  by Nikia Abbott, R.N.  Outcome: Progressing  8/14/2023 1912 by Nikia Joshua R.N.  Outcome: Progressing     Problem: Care Map:  Day 2 Optimal Outcome for the Heart Failure Patient  Goal: Day 2:  Optimal Care of the heart failure patient  8/14/2023 1914 by Nikia Joshua R.N.  Outcome: Progressing  8/14/2023 1912 by ERIC BriceN.  Outcome: Progressing     Problem: Care Map:  Day 3 Optimal Outcome for the Heart Failure Patient  Goal: Day 3:  Optimal Care of the heart failure patient  8/14/2023 1914 by Nikia Joshua R.N.  Outcome: Progressing  8/14/2023 1912 by ERIC BriceN.  Outcome: Progressing     Problem: Care Map:  Day Before Discharge Optimal Outcome for the Heart Failure Patient  Goal: Day Before Discharge:  Optimal Care of the heart failure patient  8/14/2023 1914 by Nikia Joshua R.N.  Outcome: Progressing  8/14/2023 1912 by BUDDY Brice.N.  Outcome: Progressing     Problem: Care Map:  Day of Discharge Optimal Outcome for the Heart Failure Patient  Goal: Day of Discharge:  Optimal Care of the heart failure patient  8/14/2023 1914 by Nikia Joshua R.N.  Outcome: Progressing  8/14/2023 1912 by BUDDY Brice.N.  Outcome: Progressing       Patient is not progressing towards the following goals:

## 2023-08-16 NOTE — PROGRESS NOTES
Gastroenterology Progress Note               Author:  Tarik Moreno M.D. with MARTÍN Kohler Date & Time Created: 8/16/2023 12:53 PM       Patient ID:  Name:             Richard Hubbard  YOB: 1975  Age:                 48 y.o.  male  MRN:               [id number]      Referring Provider:  You King MD      Presenting Chief Complaint:  Elevated liver enzymes      History of Present Illness:    This is a very pleasant 48 y.o. male with the past medical history that includes bipolar disorder, Lucila-en-Y gastric bypass surgery, rheumatoid arthritis on Humira, GERD, eczema, ADD, paroxysmal atrial fibrillation status cardiovascular seen in 2019, below the knee amputation who is seen in consultation for elevated liver enzymes.  He was admitted on 8/12/2023 with complaints of shortness of breath and weakness. On this admission here, noted to have sodium 120, creatinine 2.66 initially.  CMP most recent with sodium 129, potassium 2.8, chloride 90, BUN 30, creatinine 1.68, calcium 8, AST 1489, 616 ALT, , T. bili 1.2.  Initial BNP over 25,000.  He has been diagnosed by the primary team with acute diastolic heart failure.  It seems he might be a candidate for valve replacement surgery, but no current plans for this.  He is being diuresed.    He was just recently hospitalized at an outside facility [facility] on 7/16/2023 with findings of cellulitis of the right leg.  Echocardiogram was done at that time which showed severe aortic regurgitation, moderate aortic stenosis, and mild to moderate mitral regurgitation.  Treated with IV Unasyn at the outside facility and then this was transitioned to Augmentin on 7/20/2023 for 11 days.     Looking back at historical data to 2019 within the system he has had intermittently elevated AST and ALT with mild elevation.  Alkaline phosphatase more recently has increased up to 400.  Bilirubin has been normal in the past.  He also had an ultrasound  from 2019 which demonstrated a possible dilated portal vein.    Interval History  8/15/2023: AST 1126, , , Tbili 0.9. Overall feels well. No new changes. US liver and vessels negative including normal appearing portal vein although limited eval due to body habitus and patient participation.    8/16/2023: Stable. BNP much improved, but still high. AST/ALT both down significantly. Was started on steroids for suspected RA flare yesterday.     Review of Systems:  Review of Systems   Constitutional:  Positive for malaise/fatigue. Negative for chills and fever.   Respiratory:  Positive for shortness of breath. Negative for cough and wheezing.    Cardiovascular:  Negative for chest pain and leg swelling.   Gastrointestinal:  Negative for abdominal pain, blood in stool, melena, nausea and vomiting.   Musculoskeletal:  Positive for joint pain and myalgias. Negative for neck pain.   Neurological:  Positive for weakness. Negative for dizziness.   Psychiatric/Behavioral:  Negative for hallucinations. The patient does not have insomnia.              Past Medical History:  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    Hemorrhagic disorder (HCC)     hx of bleeding ulcers    History of bleeding ulcers 2/12/2015    Hypertension 05/03/2021    pt states well controlled on meds    Insomnia 5/21/2009    Migraine 5/21/2009    Morning headache      Obesity, morbid ([facility]) 5/21/2009    Osteomyelitis ([facility]) 10/7/2019    Pain 09/2019    Chronic knee and back pain    Pubic ramus fracture ([facility]) 9/28/2019    Rheumatoid arteritis ([facility]) 09/2019    knee, feet right hand    Sleep apnea 5/21/2009    Did not tolerate cpap, does not use it    Snoring      Active Hospital Problems    Diagnosis     Hypokalemia [E87.6]     Acute diastolic heart failure ([facility]) [I50.31]     Cardiorenal syndrome [I13.10]     Elevated liver function tests [R79.89]     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]     KATERYNA (acute kidney injury) ([facility]) [N17.9]     Hyponatremia [E87.1]     Generalized weakness [R53.1]     Chronic pain syndrome [G89.4]     Rheumatoid arthritis ([facility]) [M06.9]          Past Surgical History:  Past Surgical History:   Procedure Laterality Date    KNEE AMPUTATION BELOW Left 3/9/2023    Procedure: AMPUTATION, BELOW KNEE;  Surgeon: Wayne Chambers M.D.;  [facility];  Service: Orthopedics    PB TOTAL KNEE ARTHROPLASTY Right 7/22/2020    Procedure: ARTHROPLASTY, KNEE, TOTAL;  Surgeon: Temo Pires M.D.;  Location: [facility];  Service: Orthopedics    TENDON TRANSFER Right 1/22/2020    Procedure: RIGHT DISTAL ULNA RESECTION AND EXTENSOR TENDON TRANSFER;  Surgeon: Marine Rushing M.D.;  Location: [facility];  Service: Orthopedics    OTHER ORTHOPEDIC SURGERY Right 2020    total right knee replacement    IRRIGATION & DEBRIDEMENT ORTHO Left 10/9/2019    Procedure: IRRIGATION AND DEBRIDEMENT, WOUND - PELVIC OSTEOMYELITIS;  Surgeon: You Olivares M.D.;  Location: [facility];  Service: Orthopedics    OTHER SURGICAL PROCEDURE  2019    osteomelitis of pelvis cleaned    GASTRIC BYPASS LAPAROSCOPIC  2000    Lucila en y    CHOLECYSTECTOMY  2000    OTHER SURGICAL PROCEDURE      repair of broken penis           Hospital Medications:  Current Facility-Administered Medications   Medication Dose Frequency Provider Last Rate Last Admin    predniSONE  (Deltasone) tablet 40 mg  40 mg DAILY Juan Carlos Montes M.D.   40 mg at 08/16/23 0559    potassium chloride SA (Kdur) tablet 40 mEq  40 mEq BID Dakota Wilson M.D.   40 mEq at 08/16/23 0600    ondansetron (Zofran) syringe/vial injection 4 mg  4 mg Q4HRS PRN Sampson Menezes M.D.   4 mg at 08/13/23 1814    senna-docusate (Pericolace Or Senokot S) 8.6-50 MG per tablet 2 Tablet  2 Tablet BID Sampson Menezes M.D.   2 Tablet at 08/16/23 0600    And    polyethylene glycol/lytes (Miralax) PACKET 1 Packet  1 Packet QDAY PRSTEPHEN Menezes M.D.   1 Packet at 08/13/23 1754    And    magnesium hydroxide (Milk Of Magnesia) suspension 30 mL  30 mL QDAY PRSTEPHEN Menezes M.D.   30 mL at 08/15/23 1737    And    bisacodyl (Dulcolax) suppository 10 mg  10 mg QDAY PRN Sampson Menezes M.D.        heparin injection 5,000 Units  5,000 Units Q8HRS Sampson Menezes M.D.   5,000 Units at 08/16/23 0600    furosemide (Lasix) injection 40 mg  40 mg BID DIURETIC Sampson Menezes M.D.   40 mg at 08/16/23 0559    morphine ER (Ms Contin) tablet 15 mg  15 mg BID Sampson Menezes M.D.   15 mg at 08/16/23 0600    morphine (MS IR) tablet 30 mg  30 mg 4X/DAY Sampson Menezes M.D.   30 mg at 08/16/23 0922    DULoxetine (Cymbalta) capsule 60 mg  60 mg DAILY Sampson Menezes M.D.   60 mg at 08/16/23 0600   Last reviewed on 8/12/2023  2:40 PM by Gael Lazo       Current Outpatient Medications:  Medications Prior to Admission   Medication Sig Dispense Refill Last Dose    CALCIUM PO Take 1 Tablet by mouth every day.   UNK at UNK    VITAMIN D PO Take 1 Tablet by mouth every day.   UNK at UNK    Adalimumab (HUMIRA PEN) 40 MG/0.4ML Pen-injector Kit INJECT 40MG SUBCUTANEOUSLY  EVERY 2 WEEKS 2 Each 4 8/10/2023 at [facility]    pregabalin (LYRICA) 200 MG capsule Take 200 mg by mouth every day.   8/9/2023 at [facility]    morphine ER (MS CONTIN) 15 MG Tab CR tablet Take 15 mg by mouth 2 times a day.   8/11/2023 at  "[facility]    morphine (MS IR) 30 MG tablet Take 30 mg by mouth 4 times a day.   2023 at [facility]    albuterol 108 (90 Base) MCG/ACT Aero Soln inhalation aerosol Inhale 2 Puffs every 6 hours as needed for Shortness of Breath. 8.5 g 0 PRN at PRN    DULoxetine (CYMBALTA) 60 MG Cap DR Particles delayed-release capsule Take 1 Capsule by mouth every day. 30 Capsule 2 2023 at [facility]    Naloxone (NARCAN) 4 MG/0.1ML Liquid One spray in one nostril for overdose and call 911. 1 Package 0 PRN at PRN         Medication Allergies:  Allergies   Allergen Reactions    Nsaids      Bleeding, \"I had a bleeding ulcer\"    Diphenhydramine Unspecified     \"I get agitated\"    Mirtazapine Unspecified     I had a hard time waking up, lacking mental focus.     Penicillins      \"Had some dizziness\"  Tolerated Unasyn 10/2019  Tolerated Zosyn 2023    Promethazine Unspecified     \"I get very agitated\"         Family Medical History:  Family History   Problem Relation Age of Onset    Hypertension Mother     Arthritis Mother     Depression Mother     Cancer Father         bladder    Schizophrenia Father     Cancer Maternal Grandmother         breast    Heart Disease Maternal Grandmother     Psychiatric Illness Other     Stroke Maternal Aunt     Other Neg Hx         no connective tissue disorders or known aortic disease         Social History:  Social History     Socioeconomic History    Marital status:      Spouse name: Not on file    Number of children: Not on file    Years of education: Not on file    Highest education level: Bachelor's degree (e.g., BA, AB, BS)   Occupational History    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   Tobacco Use    Smoking status: Every Day     Packs/day: .25     Types: Cigarettes, Cigars     Last attempt to quit: 2023     Years since quittin.5     Passive exposure: Current    Smokeless tobacco: Never    Tobacco comments:     occasional cigar   Vaping Use    Vaping Use: Never used   Substance and Sexual Activity    " Alcohol use: No     Alcohol/week: 0.0 oz     Comment: quit 2011 ago- past ETOH abuse    Drug use: No    Sexual activity: Yes     Partners: Female     Comment: ;    Other Topics Concern     Service No    Blood Transfusions Yes    Caffeine Concern No    Occupational Exposure No    Hobby Hazards No    Sleep Concern No    Stress Concern Yes    Weight Concern Yes    Special Diet No    Back Care No    Exercise No    Bike Helmet No    Seat Belt Yes    Self-Exams Yes   Social History Narrative    , lives in [place]     Social Determinants of Health     Financial Resource Strain: Low Risk  (2/28/2023)    Overall Financial Resource Strain (CARDIA)     Difficulty of Paying Living Expenses: Not hard at all   Food Insecurity: No Food Insecurity (2/28/2023)    Hunger Vital Sign     Worried About Running Out of Food in the Last Year: Never true     Ran Out of Food in the Last Year: Never true   Transportation Needs: No Transportation Needs (3/27/2023)    PRAPARE - Transportation     Lack of Transportation (Medical): No     Lack of Transportation (Non-Medical): No   Physical Activity: Inactive (2/28/2023)    Exercise Vital Sign     Days of Exercise per Week: 0 days     Minutes of Exercise per Session: 0 min   Stress: Stress Concern Present (2/28/2023)    [employer] - Occupational Stress Questionnaire     Feeling of Stress : To some extent   Social Connections: Socially Integrated (2/28/2023)    Social Connection and Isolation Panel [NHANES]     Frequency of Communication with Friends and Family: More than three times a week     Frequency of Social Gatherings with Friends and Family: Once a week     Attends [employer] Services: More than 4 times per year     Active Member of Clubs or Organizations: Yes     Attends Club or Organization Meetings: More than 4 times per year     Marital Status:    Intimate Partner Violence: Not on file   Housing Stability: Low Risk  (2/28/2023)     "Housing Stability Vital Sign     Unable to Pay for Housing in the Last Year: No     Number of Places Lived in the Last Year: 1     Unstable Housing in the Last Year: No         Vital signs:  Weight/BMI: Body mass index is 29.54 kg/m².  /66   Pulse 88   Temp 36.8 °C (98.2 °F) (Oral)   Resp 18   Ht 1.778 m (5' 10\")   Wt 93.4 kg (205 lb 14.6 oz)   SpO2 93%   Vitals:    08/16/23 0351 08/16/23 0559 08/16/23 0756 08/16/23 1107   BP: 115/59 103/59 103/60 109/66   Pulse: 81 84 82 88   Resp: 16 16 18 18   Temp: 36.7 °C (98 °F)  36.7 °C (98 °F) 36.8 °C (98.2 °F)   TempSrc: Oral  Oral Oral   SpO2: 91%  92% 93%   Weight: 93.4 kg (205 lb 14.6 oz)      Height:         Oxygen Therapy:  Pulse Oximetry: 93 %, O2 (LPM): 1, O2 Delivery Device: Silicone Nasal Cannula    Intake/Output Summary (Last 24 hours) at 8/16/2023 1253  Last data filed at 8/16/2023 0600  Gross per 24 hour   Intake 1020 ml   Output 2400 ml   Net -1380 ml           Physical Exam:  Physical Exam  Vitals and nursing note reviewed.   Constitutional:       Appearance: He is ill-appearing.   HENT:      Head: Normocephalic and atraumatic.      Right Ear: External ear normal.      Left Ear: External ear normal.      Nose: Nose normal.      Mouth/Throat:      Mouth: Mucous membranes are moist.      Pharynx: Oropharynx is clear.   Eyes:      General: No scleral icterus.     Extraocular Movements: Extraocular movements intact.      Pupils: Pupils are equal, round, and reactive to light.   Cardiovascular:      Rate and Rhythm: Normal rate and regular rhythm.      Pulses: Normal pulses.      Heart sounds: Murmur (Systolic and diastolic) heard.   Pulmonary:      Effort: Pulmonary effort is normal. No respiratory distress.      Breath sounds: Normal breath sounds.   Abdominal:      General: Abdomen is flat. Bowel sounds are normal.      Palpations: Abdomen is soft.      Hernia: A hernia (Mid-abdominal incisional) is present.   Musculoskeletal:         General: No " tenderness or signs of injury.      Cervical back: Neck supple.   Lymphadenopathy:      Cervical: No cervical adenopathy.   Skin:     General: Skin is warm and dry.      Coloration: Skin is pale.   Neurological:      General: No focal deficit present.      Mental Status: He is alert and oriented to person, place, and time.   Psychiatric:         Thought Content: Thought content normal.         Judgment: Judgment normal.           Labs:  Recent Labs     08/14/23  1205 08/15/23  0103 08/16/23  0158   SODIUM 129* 129* 131*   POTASSIUM 2.8* 3.2* 3.6   CHLORIDE 90* 90* 90*   CO2 27 28 33   BUN 30* 24* 16   CREATININE 1.68* 1.28 1.02   MAGNESIUM  --  1.6 2.0   PHOSPHORUS  --  2.6  --    CALCIUM 8.0* 7.6* 8.0*       Recent Labs     08/14/23  1205 08/15/23  0103 08/16/23  0158   ALTSGPT 616* 577* 357*   ASTSGOT 1489* 1126* 313*   ALKPHOSPHAT 247* 226* 185*   TBILIRUBIN 1.2 0.9 0.8   GLUCOSE 96 86 130*       Recent Labs     08/14/23  1205 08/15/23  0103 08/16/23  0158   WBC  --  6.4 3.7*   NEUTSPOLYS  --  68.40  --    LYMPHOCYTES  --  21.80*  --    MONOCYTES  --  8.50  --    EOSINOPHILS  --  0.60  --    BASOPHILS  --  0.20  --    ASTSGOT 1489* 1126* 313*   ALTSGPT 616* 577* 357*   ALKPHOSPHAT 247* 226* 185*   TBILIRUBIN 1.2 0.9 0.8       Recent Labs     08/15/23  0103 08/16/23  0158   RBC 3.51* 3.55*   HEMOGLOBIN 9.9* 10.1*   HEMATOCRIT 30.3* 31.0*   PLATELETCT 211 213   PROTHROMBTM 16.0*  --    INR 1.22*  --        Recent Results (from the past 24 hour(s))   MAGNESIUM    Collection Time: 08/16/23  1:58 AM   Result Value Ref Range    Magnesium 2.0 1.5 - 2.5 mg/dL   CBC WITHOUT DIFFERENTIAL    Collection Time: 08/16/23  1:58 AM   Result Value Ref Range    WBC 3.7 (L) 4.8 - 10.8 K/uL    RBC 3.55 (L) 4.70 - 6.10 M/uL    Hemoglobin 10.1 (L) 14.0 - 18.0 g/dL    Hematocrit 31.0 (L) 42.0 - 52.0 %    MCV 87.3 81.4 - 97.8 fL    MCH 28.5 27.0 - 33.0 pg    MCHC 32.6 32.3 - 36.5 g/dL    RDW 46.9 35.9 - 50.0 fL    Platelet Count 213 164  - 446 K/uL    MPV 9.8 9.0 - 12.9 fL   Comp Metabolic Panel    Collection Time: 08/16/23  1:58 AM   Result Value Ref Range    Sodium 131 (L) 135 - 145 mmol/L    Potassium 3.6 3.6 - 5.5 mmol/L    Chloride 90 (L) 96 - 112 mmol/L    Co2 33 20 - 33 mmol/L    Anion Gap 8.0 7.0 - 16.0    Glucose 130 (H) 65 - 99 mg/dL    Bun 16 8 - 22 mg/dL    Creatinine 1.02 0.50 - 1.40 mg/dL    Calcium 8.0 (L) 8.4 - 10.2 mg/dL    Correct Calcium 9.4 8.5 - 10.5 mg/dL    AST(SGOT) 313 (H) 12 - 45 U/L    ALT(SGPT) 357 (H) 2 - 50 U/L    Alkaline Phosphatase 185 (H) 30 - 99 U/L    Total Bilirubin 0.8 0.1 - 1.5 mg/dL    Albumin 2.3 (L) 3.2 - 4.9 g/dL    Total Protein 6.5 6.0 - 8.2 g/dL    Globulin 4.2 (H) 1.9 - 3.5 g/dL    A-G Ratio 0.5 g/dL   proBrain Natriuretic Peptide, NT    Collection Time: 08/16/23  1:58 AM   Result Value Ref Range    NT-proBNP 9686 (H) 0 - 125 pg/mL   ESTIMATED GFR    Collection Time: 08/16/23  1:58 AM   Result Value Ref Range    GFR (CKD-EPI) 91 >60 mL/min/1.73 m 2         Radiology Review:  US-LIVER AND VESSELS COMPLETE (COMBO)   Final Result      Grossly unremarkable liver doppler but evaluation is very limited due to body habitus and poor breath-hold.      EC-ECHOCARDIOGRAM COMPLETE W/O CONT   Final Result      DX-CHEST-PORTABLE (1 VIEW)   Final Result      1.  Diffuse mixed interstitial and airspace opacities with enlarged cardiac silhouette could be related to edema, however multifocal pneumonia is not excluded.            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 He is now admitted to this facility 8/12/2023 with acute diastolic heart failure, cardiorenal syndrome, and elevated liver enzymes.  Suspicion for acute hepatic congestion versus ischemic hepatitis versus drug-induced liver injury versus flare of an underlying liver issue such as autoimmune hepatitis. He denies alcohol use    Assessment:  -Elevated liver enzymes  -Acute diastolic heart failure  -Cardiorenal syndrome  -Mitral valve versus atrial mass  -A-fib rheumatoid arthritis on Humira   -Cellulitis right lower extremity cellulitis so status post Augmentin    Plan:    -Await labs:AVANI, ASMA, IgG, AMA, Ceruloplasmin, Alpha-1 antitrypsin  -Avoid hepatotoxins  -Daily CMP  -Continue optimization of patient's cardiovascular status  -Outpatient follow up with [facility] to follow up labs and liver function    GI Attending -    Patient seen, examined, chart reviewed and case discussed and plan arrived at with A.P.R.N.  Agree with this note.     **  GI WILL SIGN OFF / STAND BY - PLEASE CALL IF ANY CONCERNS  **     Tarik Moreno M.D.       Francois Crisostomo, A.P.R.N.      Thank you for inviting me to participate in the care of this patient. Please do not hesitate to call GI consultants with additional questions/concerns or changes in the patient's clinical status at [phone].      Core Quality Measures   Reviewed items:  Labs, Medications and Radiology reports reviewed

## 2023-08-16 NOTE — CARE PLAN
The patient is Stable - Low risk of patient condition declining or worsening    Shift Goals  Clinical Goals: 1.5 fluid restriction, pain management  Patient Goals: rest  Family Goals: unable to assess    Progress made toward(s) clinical / shift goals:  fluid restriction continued      Problem: Knowledge Deficit - Standard  Goal: Patient and family/care givers will demonstrate understanding of plan of care, disease process/condition, diagnostic tests and medications  Outcome: Progressing  Note: Patient at beside with MD and had conversation about PCP, per pt MD answered all concerns and questions at this time.          Problem: Fall Risk  Goal: Patient will remain free from falls  Outcome: Progressing  Note: Remains free from falls throughout this shift.      Problem: Pain - Standard  Goal: Alleviation of pain or a reduction in pain to the patient’s comfort goal  Outcome: Progressing  Note: Scheduled pain medication given throughout the shift and patient has denied need for PRNs.       Patient is not progressing towards the following goals:

## 2023-08-16 NOTE — CONSULTS
Cardiology Initial Consult Note    Date of note:    8/15/2023      Consulting Physician: Juan Carlos Montes M.D.    Name:   Richard Hubbard   YOB: 1975  Age:   48 y.o.  male   MRN:   [id number]      Reason for Consultation: Decompensated valvular cardiomyopathy    HPI:  Richard Hubbard II is a 48 y.o. male with a history of rheumatoid arthritis on Humira, immunocompromise state, history of paroxysmal atrial fibrillation s/p cardioversion in 2019 not on oral anticoagulation, L BKA from nonhealing wounds 2/2 RA and Humira, bicuspid aortic valve with moderate stenosis with valvular cardiomyopathy who was admitted to the hospital on 8/12/2023 with shortness of breath and evidence of volume overload.  Cardiology consulted for abnormal echo findings.    Upon my evaluation, patient is lying comfortably in bed.  He is on supplemental oxygen.  Denies any active chest pain, pressure, palpitations, dizziness or lightheadedness.  States that he was admitted at [facility] last month due to lower extremity infection.  Has been feeling well since admission with active diuresis.  Denies any active nausea, vomiting, abdominal pain, headache, chest pain/tightness or orthopnea or PND.      ROS  A complete ROS was performed and is negative except for pertinent positives mentioned in HPI.      Past Medical History:   Diagnosis Date    ADD (attention deficit disorder)     Alcohol abuse     Allergic rhinitis 5/21/2009    Anemia 09/2019    Anxiety 5/21/2009    Apnea, sleep     Arrhythmia     afib when hospitaized for infection    Back pain 5/21/2009    Bipolar disorder (HCC)     Bleeding ulcer 5/21/2009    Bleeding ulcer 5/21/2009    History of anemia-now on nexium     Bowel habit changes     constipation related to narcotics    Daytime sleepiness     Depression 5/21/2009    Eczema 5/21/2009    GERD (gastroesophageal reflux disease)     Heart burn     on meds    Heart murmur     stenosis of ventricles- on  losartan    Hemorrhagic disorder (HCC)     hx of bleeding ulcers    History of bleeding ulcers 2/12/2015    Hypertension 05/03/2021    pt states well controlled on meds    Insomnia 5/21/2009    Migraine 5/21/2009    Morning headache     Obesity, morbid ([facility]) 5/21/2009    Osteomyelitis ([facility]) 10/7/2019    Pain 09/2019    Chronic knee and back pain    Pubic ramus fracture ([facility]) 9/28/2019    Rheumatoid arteritis ([facility]) 09/2019    knee, feet right hand    Sleep apnea 5/21/2009    Did not tolerate cpap, does not use it    Snoring        Past Surgical History:   Procedure Laterality Date    KNEE AMPUTATION BELOW Left 3/9/2023    Procedure: AMPUTATION, BELOW KNEE;  Surgeon: Wayne Chambers M.D.;  Location: SURGERY [facility];  Service: Orthopedics    PB TOTAL KNEE ARTHROPLASTY Right 7/22/2020    Procedure: ARTHROPLASTY, KNEE, TOTAL;  Surgeon: Temo Pires M.D.;  Location: [facility];  Service: Orthopedics    TENDON TRANSFER Right 1/22/2020    Procedure: RIGHT DISTAL ULNA RESECTION AND EXTENSOR TENDON TRANSFER;  Surgeon: Marine Rushing M.D.;  Location: [facility];  Service: Orthopedics    OTHER ORTHOPEDIC SURGERY Right 2020    total right knee replacement    IRRIGATION & DEBRIDEMENT ORTHO Left 10/9/2019    Procedure: IRRIGATION AND DEBRIDEMENT, WOUND - PELVIC OSTEOMYELITIS;  Surgeon: You Olivares M.D.;  Location: [facility];  Service: Orthopedics    OTHER SURGICAL PROCEDURE  2019    osteomelitis of pelvis cleaned    GASTRIC BYPASS LAPAROSCOPIC  2000    Lucila en y    CHOLECYSTECTOMY  2000    OTHER SURGICAL PROCEDURE      repair of broken penis         Medications Prior to Admission   Medication Sig Dispense Refill Last Dose    CALCIUM PO Take 1 Tablet by mouth every day.   UNK at UNK    VITAMIN D PO Take 1 Tablet by mouth every day.   UNK at UNK    Adalimumab (HUMIRA PEN) 40 MG/0.4ML Pen-injector Kit INJECT 40MG SUBCUTANEOUSLY  EVERY 2 WEEKS 2 Each 4 8/10/2023 at UNK     pregabalin (LYRICA) 200 MG capsule Take 200 mg by mouth every day.   8/9/2023 at [facility]    morphine ER (MS CONTIN) 15 MG Tab CR tablet Take 15 mg by mouth 2 times a day.   8/11/2023 at [facility]    morphine (MS IR) 30 MG tablet Take 30 mg by mouth 4 times a day.   8/11/2023 at [facility]    albuterol 108 (90 Base) MCG/ACT Aero Soln inhalation aerosol Inhale 2 Puffs every 6 hours as needed for Shortness of Breath. 8.5 g 0 PRN at PRN    DULoxetine (CYMBALTA) 60 MG Cap DR Particles delayed-release capsule Take 1 Capsule by mouth every day. 30 Capsule 2 8/9/2023 at [facility]    Naloxone (NARCAN) 4 MG/0.1ML Liquid One spray in one nostril for overdose and call 911. 1 Package 0 PRN at PRN     Current Facility-Administered Medications   Medication Dose Route Frequency Provider Last Rate Last Admin    predniSONE (Deltasone) tablet 40 mg  40 mg Oral DAILY Juan Carlos Montes M.D.   40 mg at 08/15/23 1619    ondansetron (Zofran) syringe/vial injection 4 mg  4 mg Intravenous Q4HRS PRN Sampson Menezes M.D.   4 mg at 08/13/23 1814    senna-docusate (Pericolace Or Senokot S) 8.6-50 MG per tablet 2 Tablet  2 Tablet Oral BID Sampson Menezes M.D.   2 Tablet at 08/13/23 1746    And    polyethylene glycol/lytes (Miralax) PACKET 1 Packet  1 Packet Oral QDAY PRN Sampson Menezes M.D.   1 Packet at 08/13/23 1754    And    magnesium hydroxide (Milk Of Magnesia) suspension 30 mL  30 mL Oral QDAY WING Menezes M.D.        And    bisacodyl (Dulcolax) suppository 10 mg  10 mg Rectal QDAY PRN Sampson Menezes M.D.        heparin injection 5,000 Units  5,000 Units Subcutaneous Q8HRS Sampson Menezes M.D.   5,000 Units at 08/15/23 1318    furosemide (Lasix) injection 40 mg  40 mg Intravenous BID DIURETIC Sampson Menezes M.D.   40 mg at 08/15/23 0527    morphine ER (Ms Contin) tablet 15 mg  15 mg Oral BID Sampson Menezes M.D.   15 mg at 08/15/23 0527    morphine (MS IR) tablet 30 mg  30 mg Oral 4X/DAY Sampson  "MARCUS Menezes   30 mg at 08/15/23 1620    DULoxetine (Cymbalta) capsule 60 mg  60 mg Oral DAILY Sampson MARCUS Menezes   60 mg at 08/15/23 0527         Allergies   Allergen Reactions    Nsaids      Bleeding, \"I had a bleeding ulcer\"    Diphenhydramine Unspecified     \"I get agitated\"    Mirtazapine Unspecified     I had a hard time waking up, lacking mental focus.     Penicillins      \"Had some dizziness\"  Tolerated Unasyn 10/2019  Tolerated Zosyn 2023    Promethazine Unspecified     \"I get very agitated\"         Family History   Problem Relation Age of Onset    Hypertension Mother     Arthritis Mother     Depression Mother     Cancer Father         bladder    Schizophrenia Father     Cancer Maternal Grandmother         breast    Heart Disease Maternal Grandmother     Psychiatric Illness Other     Stroke Maternal Aunt     Other Neg Hx         no connective tissue disorders or known aortic disease       Social History     Socioeconomic History    Marital status:      Spouse name: Not on file    Number of children: Not on file    Years of education: Not on file    Highest education level: Bachelor's degree (e.g., BA, AB, BS)   Occupational History    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   Tobacco Use    Smoking status: Every Day     Packs/day: 0.25     Types: Cigarettes, Cigars     Last attempt to quit: 2023     Years since quittin.5     Passive exposure: Current    Smokeless tobacco: Never    Tobacco comments:     occasional cigar   Vaping Use    Vaping Use: Never used   Substance and Sexual Activity    Alcohol use: No     Alcohol/week: 0.0 oz     Comment: quit  ago- past ETOH abuse    Drug use: No    Sexual activity: Yes     Partners: Female     Comment: ;    Other Topics Concern     Service No    Blood Transfusions Yes    Caffeine Concern No    Occupational Exposure No    Hobby Hazards No    Sleep Concern No    Stress Concern Yes    Weight Concern Yes    Special Diet No    " Back Care No    Exercise No    Bike Helmet No    Seat Belt Yes    Self-Exams Yes   Social History Narrative    , lives in [place]     Social Determinants of Health     Financial Resource Strain: Low Risk  (2/28/2023)    Overall Financial Resource Strain (CARDIA)     Difficulty of Paying Living Expenses: Not hard at all   Food Insecurity: No Food Insecurity (2/28/2023)    Hunger Vital Sign     Worried About Running Out of Food in the Last Year: Never true     Ran Out of Food in the Last Year: Never true   Transportation Needs: No Transportation Needs (3/27/2023)    PRAPARE - Transportation     Lack of Transportation (Medical): No     Lack of Transportation (Non-Medical): No   Physical Activity: Inactive (2/28/2023)    Exercise Vital Sign     Days of Exercise per Week: 0 days     Minutes of Exercise per Session: 0 min   Stress: Stress Concern Present (2/28/2023)    [place] [employer] - Occupational Stress Questionnaire     Feeling of Stress : To some extent   Social Connections: Socially Integrated (2/28/2023)    Social Connection and Isolation Panel [NHANES]     Frequency of Communication with Friends and Family: More than three times a week     Frequency of Social Gatherings with Friends and Family: Once a week     Attends Sabianism Services: More than 4 times per year     Active Member of Clubs or Organizations: Yes     Attends Club or Organization Meetings: More than 4 times per year     Marital Status:    Intimate Partner Violence: Not on file   Housing Stability: Low Risk  (2/28/2023)    Housing Stability Vital Sign     Unable to Pay for Housing in the Last Year: No     Number of Places Lived in the Last Year: 1     Unstable Housing in the Last Year: No         Intake/Output Summary (Last 24 hours) at 8/15/2023 1717  Last data filed at 8/15/2023 1600  Gross per 24 hour   Intake 1620 ml   Output 8000 ml   Net -6380 ml        Physical Exam  Body mass index is 29.96 kg/m².  BP  "90/48   Pulse 77   Temp 36.6 °C (97.9 °F) (Oral)   Resp 16   Ht 1.778 m (5' 10\")   Wt 94.7 kg (208 lb 12.4 oz)   SpO2 97%   Vitals:    08/15/23 0316 08/15/23 0709 08/15/23 1139 08/15/23 1548   BP: 105/52 100/54 97/51 90/48   Pulse: 79 77 79 77   Resp: 18 18 18 16   Temp: 36.4 °C (97.5 °F) 36.2 °C (97.2 °F) 36.6 °C (97.8 °F) 36.6 °C (97.9 °F)   TempSrc: Oral Axillary Oral Oral   SpO2: 94% 97% 96% 97%   Weight: 94.7 kg (208 lb 12.4 oz)      Height:         Oxygen Therapy:  Pulse Oximetry: 97 %, O2 (LPM): 1, O2 Delivery Device: Silicone Nasal Cannula    General: Laying in bed, in no acute distress, on supplemental oxygen  Eyes: nl conjunctiva  ENT: OP clear  Neck: JVP not elevated,  no carotid bruits  Lungs: normal respiratory effort, CTAB  Heart: RRR, harsh systolic murmur heard throughout the precordium, no rubs or gallops, no edema right lower extremity.    Abdomen: soft, non tender, non distended, no masses, normal bowel sounds.  No HSM.  Extremities/MSK: no clubbing, no cyanosis  Neurological: No focal sensory deficits  Psychiatric: Appropriate affect, A/O x 3  Skin: Warm extremities      Labs (personally reviewed and notable for):   Lab Results   Component Value Date/Time    SODIUM 129 (L) 08/15/2023 01:03 AM    POTASSIUM 3.2 (L) 08/15/2023 01:03 AM    CHLORIDE 90 (L) 08/15/2023 01:03 AM    CO2 28 08/15/2023 01:03 AM    GLUCOSE 86 08/15/2023 01:03 AM    BUN 24 (H) 08/15/2023 01:03 AM    CREATININE 1.28 08/15/2023 01:03 AM    CREATININE 0.95 10/19/2010 12:05 PM    BUNCREATRAT 9 10/19/2010 12:05 PM    GLOMRATE >59 10/19/2010 12:05 PM      Lab Results   Component Value Date/Time    WBC 6.4 08/15/2023 01:03 AM    WBC 8.4 10/19/2010 12:05 PM    RBC 3.51 (L) 08/15/2023 01:03 AM    RBC 4.96 10/19/2010 12:05 PM    HEMOGLOBIN 9.9 (L) 08/15/2023 01:03 AM    HEMATOCRIT 30.3 (L) 08/15/2023 01:03 AM    MCV 86.3 08/15/2023 01:03 AM    MCV 85 10/19/2010 12:05 PM    MCH 28.2 08/15/2023 01:03 AM    MCH 28.4 10/19/2010 12:05 " PM    MCHC 32.7 08/15/2023 01:03 AM    MPV 10.0 08/15/2023 01:03 AM    NEUTSPOLYS 68.40 08/15/2023 01:03 AM    LYMPHOCYTES 21.80 (L) 08/15/2023 01:03 AM    MONOCYTES 8.50 08/15/2023 01:03 AM    EOSINOPHILS 0.60 08/15/2023 01:03 AM    BASOPHILS 0.20 08/15/2023 01:03 AM    HYPOCHROMIA 1+ 10/25/2019 04:00 AM    ANISOCYTOSIS 1+ 03/07/2023 12:52 AM      Lab Results   Component Value Date/Time    CHOLSTRLTOT 130 01/28/2021 03:30 PM    LDL 48 01/28/2021 03:30 PM    HDL 68 01/28/2021 03:30 PM    TRIGLYCERIDE 72 01/28/2021 03:30 PM       Lab Results   Component Value Date/Time    TROPONINT 30 (H) 08/13/2023 2116     Lab Results   Component Value Date/Time    NTPROBNP 06558 (H) 08/12/2023 1348         Cardiac Imaging and Procedures Review:    EKG dated 8/13/2023: My personal interpretation is sinus rhythm, LBBB,  ms    Echo dated 8/12/2023: My personal interpretation is LVEF 55%, moderate MR, moderate aortic stenosis with V-max 3.6 m/s, RVSP 50 mmHg    Outside Echo dated 7/16/2023:  LVEF range is estimated at 50 % - 55 %.     Moderately dilated right ventricle.     RVSP(estimated pulmonary artery systolic pressure): 39 mmHg.     The left atrium is severely dilated.     The right atrium is moderately to markedly dilated.     Mild - moderate mitral regurgitation.     No mitral valve stenosis.     There is a  calcified mass, measuring 2.2 cm by 2.3 cm on the base of the anterior leaflet, cannot rule out a small   vegetation on this structre   AV Vmax, Caliper: 3.97 m/s.     Mild-moderate tricuspid regurgitation.     The aortic root exhibits moderate dilatation.     The inferior vena cava is dilated.     MIA dated 11/8/2019:  Bicuspid aortic valve with fusion of RCC and LCC  Borderline dilated ascending aorta 4 cm    Nuclear Perfusion Imaging (11/8/2019):  Large inferior wall defect, artifact versus infarct  No reversible ischemia  EF 35%    Telemetry (last 24 hours): Sinus rhythm      Radiology test Review:  US-LIVER AND  VESSELS COMPLETE (COMBO)   Final Result      Grossly unremarkable liver doppler but evaluation is very limited due to body habitus and poor breath-hold.      EC-ECHOCARDIOGRAM COMPLETE W/O CONT   Final Result      DX-CHEST-PORTABLE (1 VIEW)   Final Result      1.  Diffuse mixed interstitial and airspace opacities with enlarged cardiac silhouette could be related to edema, however multifocal pneumonia is not excluded.          Problem list:  Principal Problem:    Acute diastolic heart failure (HCC) (POA: Yes)  Active Problems:    Chronic pain syndrome (POA: Yes)    Generalized weakness (POA: Unknown)    Hyponatremia (POA: Yes)    Cardiorenal syndrome (POA: Yes)    Elevated liver function tests (POA: Unknown)    Mitral valve mass (POA: Yes)    KATERYNA (acute kidney injury) (HCC) (POA: Yes)    Hypokalemia (POA: Unknown)    Rheumatoid arthritis (HCC) (Chronic) (POA: Yes)      Overview: On Humira  Resolved Problems:    * No resolved hospital problems. *      Recommendations:  -- Complex patient case.  -- Patient with decompensated valvular cardiomyopathy with component of HFpEF and pulmonary hypertension.  -- Difficult to assess volume status, appears not extremely overloaded.  Obtain repeat NT proBNP to monitor.  -- IV Lasix 40 mg twice daily.  If NT proBNP is significantly improved from before, decrease IV Lasix to 20 mg twice daily.  -- Has remained hypokalemic throughout hospital stay.  Recommend scheduled potassium supplements to avoid life-threatening arrhythmias with electrolyte derangements.  Goal K>4 and Mg>2.      Thank you for allowing me to participate in the care of this patient, cardiology will continue to follow.  Please contact me with any questions.      Dakota Wilson M.D.  Cardiologist, [facility] [place]   [phone]    This note was generated using voice recognition software which has a small chance of producing errors of grammar and possibly content. I have made every reasonable  attempt to find and correct any obvious errors, but expect that some may not be found prior to finalization of this note.

## 2023-08-16 NOTE — CARE PLAN
The patient is Watcher - Medium risk of patient condition declining or worsening    Shift Goals  Clinical Goals: Pain management, 1.5L fluid restriction, monitor liver enzymes, diurese  Patient Goals: comfort, pain management  Family Goals: unable to assess    Progress made toward(s) clinical / shift goals:    Problem: Skin Integrity  Goal: Skin integrity is maintained or improved  Outcome: Progressing  Note: Pt's bottom assessed to be red and blanching. Pt in agreement for waffle overlay, however when pt was repositioned and turned, bottom was no longer red and appropriately colored. Barrier cream and waffle overlay placed. Foot wound dry and open to air and does not pose any pain or discomfort to Pt.      Problem: Pain - Standard  Goal: Alleviation of pain or a reduction in pain to the patient’s comfort goal  Outcome: Progressing  Note: Pt reported no discomfort at start of shift. Pt later reported his jaw and shoulder hurting and requested one time dose of dilaudid. MD notified and received orders. Pt has been able to sleep comfortable since administration.      Problem: Hemodynamics  Goal: Patient's hemodynamics, fluid balance and neurologic status will be stable or improve  Outcome: Progressing  Note: Pt BP in 100s systolically and stable. Pt BP will be routinely taken and prior to med administration for safety.        Patient is not progressing towards the following goals:

## 2023-08-16 NOTE — PROGRESS NOTES
Received report from Isacc CUEVAS, at pt bedside. Pt reported no needs at this time, reviewed and discussed POC. Waffle overlay and barrier cream placed. Call light and belongings within reach. Bed locked and in low position. Alarm on and fall precautions in place.

## 2023-08-16 NOTE — DISCHARGE PLANNING
"[facility] TCN chart review completed. Collaborated with LINSEY Wilhelm prior to meeting with the pt. The most current review of medical record, knowledge of pt's PLOF and social support, LACE+ score of 76 and 6 clicks scores of 18 for ADLs and 14 for mobility were considered. Per chart review, patient currently on 2L O2 (does not use home O2 at baseline).    PT/OT evals pending.    Pt seen at bedside. Introduced TCN program. Provided education regarding post acute levels of care. Discussed [facility] plan benefits. Pt verbalizes understanding. Patient states he is below his baseline with mobility and ADLs the last few weeks due to weakness, pain, and SOB. He says that since he had L BKA, he has had difficulty wearing his prosthetic limb. He said he has been WC bound the last few weeks. He owns a WC and a 4WW at home. He lives in [place] with his wife Katiana. He said he stayed in [facility] rehab hospital following his amputation, but has not had any outpatient PT as \"there are no PTs in [place].\" Patient feels he could benefit from more PT to become more mobile and independent. He was amenable to post acute placement or HH, but would like to wait for therapy recommendations before giving choice. Patient also has f/u appt with cardiology on 8/24.     TCN will continue to follow and collaborate with discharge planning team as additional post acute needs arise. Thank you.     Completed today:  PT/OT evals pending  Choice obtained: none (see above)    *Addendum 1700 -   -PT eval completed today with recs for HH vs. Post acute placement. Per PT note, \"Anticipate pt to progress with mobility and return back to baseline once pain is medically managed. Due to pain pt has been immobile and deconditioned. If pt continues to be in pain and limited in mobility pt may benefit from post acute therapy prior to d/c home.\"    -OT eval completed with HH vs. Post acute placement. Per OT note, \"Recommmending continue skilled occupational therapy via " "Home Health if pt can complete 2/3 Short Term Goals (STG's).  If unable, then recommednding continued skilled occupational therapy via post acute placement.\"  "

## 2023-08-16 NOTE — DISCHARGE PLANNING
"[facility] TCN chart review completed. Collaborated with LINSEY Wilhelm. Current discharge considerations are home with HH and DME(O2) if recommended at time of discharge vs. SNF (depending on progress made with therapy while inpatient). Per chart review, patient currently on 1L O2 (none at baseline). TCN left VM on patient's phone today. TCN also spoke with patient's wife, Katiana, over the phone. HIPPA verified. She reports that patient needs help making and keeping his f/u appts (cardiology, prosthetic team, etc.). TCN sent referral to [facility] case management team to reach out to patient for possible help managing medical care and appts.    PT/OT evals completed 8/15/23 with for HH vs. Post acute placement. Per PT note, \"Anticipate pt to progress with mobility and return back to baseline once pain is medically managed. Due to pain pt has been immobile and deconditioned. If pt continues to be in pain and limited in mobility pt may benefit from post acute therapy prior to d/c home.\"    As previously noted, Patient states he is below his baseline with mobility and ADLs the last few weeks due to weakness, pain, and SOB. He says that since he had L BKA, he has had difficulty wearing his prosthetic limb. He said he has been WC bound the last few weeks. He owns a WC and a 4WW at home. He lives in [place] with his wife Katiana. He said he stayed in [facility] acute rehab hospital following his amputation, but has not had any outpatient PT as \"there are no PTs in [place].\" Patient feels he could benefit from more PT to become more mobile and independent. He was amenable to post acute placement or HH, but would like to wait for therapy recommendations before giving choice. Patient also has f/u appt with cardiology on 8/24.    TCN sent copies of SNF choice forms to patient and wife via email so they can discuss options. TCN or CM to round back with patient Thursday to obtain SNF choice. TCN will continue to follow and collaborate with discharge " planning team as additional post acute needs arise. Thank you.    Completed today:  PT/OT with recs for SNF vs. [facility] CM referral to help with med appts  Choice obtained: [facility](O2)

## 2023-08-16 NOTE — PROGRESS NOTES
"Cardiology Follow-up Consult Note    Date of Service:    8/16/2023      Consulting Physician: Juan Carlos Montes M.D.    Name:   Richard Hubbard   YOB: 1975  Age:   48 y.o.  male   MRN:   [id number]      Subjective:  No acute events overnight.  Sleeping comfortably upon my evaluation.  No concerning cardiac symptoms.      All other review of systems reviewed and negative.      Past medical, surgical, social, and family history reviewed and unchanged from admission except as noted in assessment and plan.    Medications: Reviewed in MAR      Allergies   Allergen Reactions    Nsaids      Bleeding, \"I had a bleeding ulcer\"    Diphenhydramine Unspecified     \"I get agitated\"    Mirtazapine Unspecified     I had a hard time waking up, lacking mental focus.     Penicillins      \"Had some dizziness\"  Tolerated Unasyn 10/2019  Tolerated Zosyn 02/2023    Promethazine Unspecified     \"I get very agitated\"         Intake/Output Summary (Last 24 hours) at 8/16/2023 1635  Last data filed at 8/16/2023 1400  Gross per 24 hour   Intake 800 ml   Output 2300 ml   Net -1500 ml        Physical Exam  Body mass index is 29.54 kg/m².  /58   Pulse 89   Temp 36.8 °C (98.2 °F) (Oral)   Resp 18   Ht 1.778 m (5' 10\")   Wt 93.4 kg (205 lb 14.6 oz)   SpO2 94%   Vitals:    08/16/23 0559 08/16/23 0756 08/16/23 1107 08/16/23 1540   BP: 103/59 103/60 109/66 102/58   Pulse: 84 82 88 89   Resp: 16 18 18 18   Temp:  36.7 °C (98 °F) 36.8 °C (98.2 °F) 36.8 °C (98.2 °F)   TempSrc:  Oral Oral Oral   SpO2:  92% 93% 94%   Weight:       Height:         Oxygen Therapy:  Pulse Oximetry: 94 %, O2 (LPM): 1, O2 Delivery Device: Silicone Nasal Cannula    General: In no acute distress, on supplemental oxygen  Eyes: nl conjunctiva  ENT: OP clear  Neck: JVP not elevated, no carotid bruits  Lungs: normal respiratory effort, CTAB  Heart: RRR, systolic murmur heard throughout the precordium, no rubs or gallops, minimal RLE edema  Abdomen: " soft, non tender, non distended  Extremities/MSK: no clubbing, no cyanosis  Neurological: No focal sensory deficits  Psychiatric: Appropriate affect, A/O x 3  Skin: Warm extremities    Labs (personally reviewed and notable for):   Lab Results   Component Value Date/Time    SODIUM 131 (L) 08/16/2023 01:58 AM    POTASSIUM 3.6 08/16/2023 01:58 AM    CHLORIDE 90 (L) 08/16/2023 01:58 AM    CO2 33 08/16/2023 01:58 AM    GLUCOSE 130 (H) 08/16/2023 01:58 AM    BUN 16 08/16/2023 01:58 AM    CREATININE 1.02 08/16/2023 01:58 AM    CREATININE 0.95 10/19/2010 12:05 PM    BUNCREATRAT 9 10/19/2010 12:05 PM    GLOMRATE >59 10/19/2010 12:05 PM      Lab Results   Component Value Date/Time    WBC 3.7 (L) 08/16/2023 01:58 AM    WBC 8.4 10/19/2010 12:05 PM    RBC 3.55 (L) 08/16/2023 01:58 AM    RBC 4.96 10/19/2010 12:05 PM    HEMOGLOBIN 10.1 (L) 08/16/2023 01:58 AM    HEMATOCRIT 31.0 (L) 08/16/2023 01:58 AM    MCV 87.3 08/16/2023 01:58 AM    MCV 85 10/19/2010 12:05 PM    MCH 28.5 08/16/2023 01:58 AM    MCH 28.4 10/19/2010 12:05 PM    MCHC 32.6 08/16/2023 01:58 AM    MPV 9.8 08/16/2023 01:58 AM    NEUTSPOLYS 68.40 08/15/2023 01:03 AM    LYMPHOCYTES 21.80 (L) 08/15/2023 01:03 AM    MONOCYTES 8.50 08/15/2023 01:03 AM    EOSINOPHILS 0.60 08/15/2023 01:03 AM    BASOPHILS 0.20 08/15/2023 01:03 AM    HYPOCHROMIA 1+ 10/25/2019 04:00 AM    ANISOCYTOSIS 1+ 03/07/2023 12:52 AM      Lab Results   Component Value Date/Time    CHOLSTRLTOT 130 01/28/2021 03:30 PM    LDL 48 01/28/2021 03:30 PM    HDL 68 01/28/2021 03:30 PM    TRIGLYCERIDE 72 01/28/2021 03:30 PM       Lab Results   Component Value Date/Time    TROPONINT 30 (H) 08/13/2023 2116     Lab Results   Component Value Date/Time    NTPROBNP 9686 (H) 08/16/2023 0158       Cardiac Imaging and Procedures Review:    ECG: ECG performed 8/13/2023 was interpreted by me which shows sinus rhythm    Echo: Echocardiogram performed on 8/12/2023 was personally interpreted by me which shows LVEF 55%, moderate  MR, moderate aortic stenosis, moderate aortic insufficiency, RVSP 50 mmHg    24-hour telemetry personally reviewed which shows sinus rhythm    Radiology test Review:  US-LIVER AND VESSELS COMPLETE (COMBO)   Final Result      Grossly unremarkable liver doppler but evaluation is very limited due to body habitus and poor breath-hold.      EC-ECHOCARDIOGRAM COMPLETE W/O CONT   Final Result      DX-CHEST-PORTABLE (1 VIEW)   Final Result      1.  Diffuse mixed interstitial and airspace opacities with enlarged cardiac silhouette could be related to edema, however multifocal pneumonia is not excluded.          Problem list:  Principal Problem:    Acute diastolic heart failure (HCC) (POA: Yes)  Active Problems:    Chronic pain syndrome (POA: Yes)    Generalized weakness (POA: Unknown)    Hyponatremia (POA: Yes)    Cardiorenal syndrome (POA: Yes)    Elevated liver function tests (POA: Unknown)    Mitral valve mass (POA: Yes)    KATERYNA (acute kidney injury) (HCC) (POA: Yes)    Hypokalemia (POA: Unknown)    Rheumatoid arthritis (HCC) (Chronic) (POA: Yes)      Overview: On Humira  Resolved Problems:    * No resolved hospital problems. *    #Pulmonary hypertension  #Valvular cardiomyopathy  #Acute on chronic HFpEF  #Bicuspid aortic valve    Recommendations:  -- Patient admitted with acute on chronic decompensated HFpEF.  NT-proBNP significantly improved since admission with continued IV diuresis.  Continue IV Lasix 40 mg twice daily for an additional day.  -- Start Farxiga 10 mg daily.  -- Switch to oral torsemide 40 mg twice daily upon discharge along with potassium supplement.  -- Follow-up in heart failure clinic upon discharge.      Thank you for allowing me to participate in the care of this patient, cardiology will continue to follow.  Please contact me with any questions.    Dakota Wilson M.D.  Cardiologist, [facility] [place]   [phone]    Please note that this dictation was created using voice  recognition software. I have made every reasonable attempt to correct obvious errors, but it is possible there are errors of grammar and possibly content that I did not discover before finalizing the note.

## 2023-08-16 NOTE — PROGRESS NOTES
"Hospital Medicine Daily Progress Note    Date of Service  8/16/2023    Chief Complaint  Richard Hubbard II is a 48 y.o. male admitted 8/12/2023 with SOB    Hospital Course  Per notes, \"48 y.o. 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  On ant leaflet on the Mitral is calcified mass 3 cm.     CXR c/w pulm edema   , creatinine 2.66, lfts elevated, pro BNP 93706  EKG pt now has LBBB which is new     I discussed the plan of care with   D/w DR. Ivey from cardiology the ECHO findings of 3 weeks ago and pt status, recommends no need to transfer to [facility] at this time   Optimize cardiac status with diuresis and if worsens to transfer to [facility] for further evaluation by cardiology     In ER he received 300 cc of fluid, vanc and zosyn     Pt being admitted to [facility] ICU      8/13: ECHO yesterday is different readings than the [place] ECHO  \"Normal left ventricular chamber size.  The left ventricular ejection fraction is visually estimated to be 55%.  Normal right ventricular size and systolic function.  Moderate mitral regurgitation.  Moderate aortic valve stenosis.  Vmax is3.6  m/s.  Moderate aortic insufficiency.  Estimated right ventricular systolic pressure is 50 mmHg.\"     AI is quantified as moderate now  Mitral calcified mass is read as possible atrial mass  Diastolic heart failure Grade I  Do think pt weill need a MIA  Consult rounding cardiologist on Monday  Pt feels better with diuresis.\"    Interval Problem Update  Per notes, \"8/14: Patient seen at bedside this morning.  Overall the patient feels better.  I did consult and discussed case with cardiology with Dr. Clements who " "recommends only to continue with diuresis for now.  If the patient decompensates the patient would potentially require transfer to [facility] for further management and care cardiology.  We have ordered right upper quadrant ultrasound to evaluate for liver function test, however I suspect this is most likely due to cardiac congestion we will continue with diuresis for now.  I have also ordered hepatitis panel.  Patient denying abdominal pain at this time.    --UPDATE: Worsening AST, hepatitis panel pending as well as RUQ-US. We have consulted GI, we appreciate further recommendations.\"    8/16: Patient had remarkable improvement and LFTs overnight.  Overall joint pain has improved as well.  We will continue to monitor this more day if continues to improve likely DC in the next 24 to 48 hours.    I have discussed this patient's plan of care and discharge plan at IDT rounds today with Case Management, Nursing, Nursing leadership, and other members of the IDT team.    Consultants/Specialty  cardiology - Dr Clements    Code Status  Full Code    Disposition  The patient is not medically cleared for discharge to home or a post-acute facility.  Anticipate discharge to: home with close outpatient follow-up      Review of Systems  Review of Systems   Constitutional:  Positive for malaise/fatigue. Negative for chills and fever.   HENT:  Negative for hearing loss and nosebleeds.    Eyes:  Negative for blurred vision and double vision.   Respiratory:  Positive for shortness of breath.    Cardiovascular:  Positive for leg swelling. Negative for chest pain and palpitations.   Gastrointestinal:  Negative for abdominal pain, heartburn and vomiting.   Genitourinary:  Negative for dysuria and urgency.   Musculoskeletal:  Negative for back pain and falls.   Skin:  Negative for itching and rash.   Neurological:  Negative for dizziness and headaches.   Psychiatric/Behavioral:  Negative for substance abuse. The patient is not nervous/anxious.  "   All other systems reviewed and are negative.       Physical Exam  Temp:  [36.4 °C (97.6 °F)-36.8 °C (98.2 °F)] 36.8 °C (98.2 °F)  Pulse:  [77-88] 88  Resp:  [16-18] 18  BP: ()/(48-69) 109/66  SpO2:  [91 %-97 %] 93 %    Physical Exam  Vitals and nursing note reviewed.   Constitutional:       Appearance: He is obese. He is ill-appearing.   HENT:      Head: Normocephalic and atraumatic.   Cardiovascular:      Rate and Rhythm: Normal rate and regular rhythm.      Heart sounds: Murmur heard.   Pulmonary:      Breath sounds: Rales present.      Comments: On supplemental oxygen  Abdominal:      General: Abdomen is flat. Bowel sounds are normal. There is no distension.      Palpations: Abdomen is soft.      Tenderness: There is no abdominal tenderness.   Musculoskeletal:      Cervical back: Normal range of motion and neck supple.      Right lower leg: Edema present.      Comments: Left BKA   Skin:     General: Skin is warm.   Neurological:      General: No focal deficit present.      Mental Status: He is alert and oriented to person, place, and time.         Fluids    Intake/Output Summary (Last 24 hours) at 8/16/2023 1334  Last data filed at 8/16/2023 0600  Gross per 24 hour   Intake 1020 ml   Output 2400 ml   Net -1380 ml       Laboratory  Recent Labs     08/15/23  0103 08/16/23  0158   WBC 6.4 3.7*   RBC 3.51* 3.55*   HEMOGLOBIN 9.9* 10.1*   HEMATOCRIT 30.3* 31.0*   MCV 86.3 87.3   MCH 28.2 28.5   MCHC 32.7 32.6   RDW 46.9 46.9   PLATELETCT 211 213   MPV 10.0 9.8     Recent Labs     08/14/23  1205 08/15/23  0103 08/16/23  0158   SODIUM 129* 129* 131*   POTASSIUM 2.8* 3.2* 3.6   CHLORIDE 90* 90* 90*   CO2 27 28 33   GLUCOSE 96 86 130*   BUN 30* 24* 16   CREATININE 1.68* 1.28 1.02   CALCIUM 8.0* 7.6* 8.0*     Recent Labs     08/15/23  0103   INR 1.22*               Imaging  US-LIVER AND VESSELS COMPLETE (COMBO)   Final Result      Grossly unremarkable liver doppler but evaluation is very limited due to body habitus  "and poor breath-hold.      EC-ECHOCARDIOGRAM COMPLETE W/O CONT   Final Result      DX-CHEST-PORTABLE (1 VIEW)   Final Result      1.  Diffuse mixed interstitial and airspace opacities with enlarged cardiac silhouette could be related to edema, however multifocal pneumonia is not excluded.           Assessment/Plan  * Acute diastolic heart failure (HCC)- (present on admission)  Assessment & Plan  We have consulted cardiology  I spoke with Dr Clements who recommended continue diuresis  Monitor volume status  Continue IV lasix for now, with holding parameters    Hypokalemia  Assessment & Plan  Replace as needed  monitor    KATERYNA (acute kidney injury) (HCC)- (present on admission)  Assessment & Plan  Most likely due to volume overload  Continue diuresis for now  monitor    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)  Assessment & Plan  As per ICU notes:  \"Seen on ECHO at [place] 7/2023 but current ECHO says probable atrial mass  Calcified on ant leaflet - 2.2 cm by 2.3 cm on the base of the anterior leaflet on 7/2023 current echo possible atrial mass\"    8/14: Cardiology has been consulted, we appreciate further recommendations. They are recommending to continue diuresis for now.    Elevated liver function tests  Assessment & Plan  Cardiac congestion? Continue with diuresis  Denying abdominal pain  Hepatitis panel negative for acute findings.  US-RUQ negative for acute findings  Improving- a.m. labs    Cardiorenal syndrome- (present on admission)  Assessment & Plan  Continue diuresis  Creatinine continues to improve  If worsening we will consider consulting nephrology    Hyponatremia- (present on admission)  Assessment & Plan  In the setting of volume overload  Monitor  improving    Generalized weakness  Assessment & Plan  PT/OT eval and treat    Chronic pain syndrome- (present on admission)  Assessment & Plan  Continue home pain medications    Rheumatoid arthritis (HCC)- (present on admission)  Assessment & Plan  Hx " of  Patient on Humira  Both ESR and CRP elevated .  Prednisone 40 mg daily  Pain management with home morphine  Improving         VTE prophylaxis: heparin    I have performed a physical exam and reviewed and updated ROS and Plan today (8/16/2023). In review of yesterday's note (8/15/2023), there are no changes except as documented above.

## 2023-08-16 NOTE — PROGRESS NOTES
Telemetry Shift Summary    Rhythm SR/SB/1st Degree AVB  HR Range 42-82  Ectopy R PVCs  Measurements  0.24/0.10/0.42    Normal Values  Rhythm SR  HR Range   Measurements 0.12-0.20 / 0.06-0.10 / 0.30-0.52

## 2023-08-16 NOTE — PROGRESS NOTES
Telemetry Shift Summary     Rhythm SR with first degree AVB  HR Range 82-85  Ectopy rPVC  Measurements  0.54/0.10/0.40     Normal Values  Rhythm SR  HR Range    Measurements 0.12-0.20 / 0.06-0.10  / 0.30-0.52

## 2023-08-17 NOTE — DISCHARGE PLANNING
"HTH/SCP TCN chart review completed. Collaborated with BEATRIS Wilhelm. Current discharge considerations are SNF, depending on PT/OT re-evaluation.  Per BEATRIS, MD has reached out to therapy team to follow up with patient. Patient seen at bedside. Report he is feeling significantly weaker from \"just laying here,\" and reports he does not think he is strong enough to return home. SNF choice obtained.    TCN will continue to follow and collaborate with discharge planning team as additional post acute needs arise. Thank you.    Completed:  PT/OT with recs for SNF vs. HH  Anticipate updated notes (see above)  Choice obtained: HH, DME(O2), SNF  [facility] (1)  [facility] (2)  [facility] (3)    *Update*  Noted PT/OT are now recommending post acute placement.     Per post acute TCN, authorization for SNF provided.      [facility] / [place] remain pending.    "

## 2023-08-17 NOTE — CARE PLAN
The patient is Stable - Low risk of patient condition declining or worsening    Shift Goals  Clinical Goals: Pain control  Patient Goals: Sleep  Family Goals: unable to assess    Progress made toward(s) clinical / shift goals:    Problem: Knowledge Deficit - Standard  Goal: Patient and family/care givers will demonstrate understanding of plan of care, disease process/condition, diagnostic tests and medications  Outcome: Progressing     Problem: Fall Risk  Goal: Patient will remain free from falls  Outcome: Progressing     Problem: Skin Integrity  Goal: Skin integrity is maintained or improved  Outcome: Progressing     Problem: Pain - Standard  Goal: Alleviation of pain or a reduction in pain to the patient’s comfort goal  Outcome: Progressing       Patient is not progressing towards the following goals:

## 2023-08-17 NOTE — PROGRESS NOTES
Report received and care assumed from FAITH Valera and DELL Harman. Bed low and locked; bed and strip alarms engaged. Pt is sleeping, with even chest rise and fall noted.

## 2023-08-17 NOTE — THERAPY
Occupational Therapy  Daily Treatment     Patient Name: Richard Hubbard II  Age:  48 y.o., Sex:  male  Medical Record #: [id number]  Today's Date: 8/17/2023     Precautions  Precautions: Fall Risk  Comments: L BKA    Assessment    Pt demonstrated SBA supine to EOB sitting, CGA sit/stand, Mod assist squat pivot transfer from EOB sitting to bedside chair/recliner., Mod assist LBCM, Mod I UBCM, Mod I G&H seated.  Therapist reviewed/educated pt on environmental/home safety, fall precautions, AE/DME, ADL's and transfers.    Plan    Treatment Plan Status: Continue Current Treatment Plan  Type of Treatment: Self Care / Activities of Daily Living, Adaptive Equipment, Neuro Re-Education / Balance, Therapeutic Exercises, Therapeutic Activity  Treatment Frequency: 3 Times per Week  Treatment Duration: Until Therapy Goals Met    DC Equipment Recommendations: Unable to determine at this time  Discharge Recommendations: (P) Recommend post-acute placement for additional occupational therapy services prior to discharge home    Subjective    Pt was alert and cooperative w/ tx.     Objective       08/17/23 9147    Services   Is patient using  services for this encounter? No   Precautions   Precautions Fall Risk   Comments L BKA   Vitals   Pulse Oximetry 92 %   O2 (LPM) 2   O2 Delivery Device Silicone Nasal Cannula   Pain   Intervention Rest;Repositioned   Pain 0 - 10 Group   Location Jaw;Shoulder;Wrist   Location Orientation Right   Description Aching   Comfort Goal Comfort with Movement;Perform Activity   Therapist Pain Assessment Prior to Activity;During Activity;Post Activity   Cognition    Cognition / Consciousness WDL   Balance   Sitting Balance (Static) Fair +   Sitting Balance (Dynamic) Fair   Standing Balance (Static) Fair -   Standing Balance (Dynamic) Poor +   Weight Shift Sitting Fair   Weight Shift Standing Absent   Skilled Intervention Verbal Cuing;Tactile Cuing;Postural Facilitation;Facilitation    Comments No prosthesis present   Bed Mobility    Supine to Sit Standby Assist   Activities of Daily Living   Upper Body Dressing Modified Independent   Lower Body Dressing Moderate Assist   Toileting Moderate Assist   How much help from another person does the patient currently need...   Putting on and taking off regular lower body clothing? 2   Bathing (including washing, rinsing, and drying)? 3   Toileting, which includes using a toilet, bedpan, or urinal? 2   Putting on and taking off regular upper body clothing? 4   Taking care of personal grooming such as brushing teeth? 4   Eating meals? 4   6 Clicks Daily Activity Score 19   Functional Mobility   Sit to Stand Contact Guard Assist   Bed, Chair, Wheelchair Transfer Moderate Assist   Transfer Method Squat Pivot   Skilled Intervention Tactile Cuing;Verbal Cuing;Postural Facilitation   Activity Tolerance   Sitting in Chair 20   Sitting Edge of Bed 10   Standing <1 min   Short Term Goals   Short Term Goal # 1 Mod I LB clothing management via AE/DME PRN   Goal Outcome # 1 Progressing as expected   Short Term Goal # 2 Mod I UB clothing management   Goal Outcome # 2 Goal met   Short Term Goal # 3 Sup/SBA toileting via AE/DME PRN   Goal Outcome # 3 Progressing as expected   Occupational Therapy Treatment Plan    O.T. Treatment Plan Continue Current Treatment Plan   Anticipated Discharge Equipment and Recommendations   Discharge Recommendations Recommend post-acute placement for additional occupational therapy services prior to discharge home

## 2023-08-17 NOTE — THERAPY
Physical Therapy   Daily Treatment     Patient Name: Richard Hubbard II  Age:  48 y.o., Sex:  male  Medical Record #: [id number]  Today's Date: 8/17/2023     Precautions  Precautions: Fall Risk    Assessment    Pt stated that his wife forgot his prostheses- unable to complete gait or transfer training in this session due to this. Pt completed STSx3 with CGA with FWW at EOB. Pt demonstrates generalized weakness and states he is below his PLOF. Pt states concerns about DC home due to decrease in functional status.     Plan    Treatment Plan Status: Continue Current Treatment Plan  Type of Treatment: Bed Mobility, Equipment, Gait Training, Manual Therapy, Neuro Re-Education / Balance, Self Care / Home Evaluation, Therapeutic Activities, Therapeutic Exercise  Treatment Frequency: 3 Times per Week  Treatment Duration: Until Therapy Goals Met    DC Equipment Recommendations: Unable to determine at this time  Discharge Recommendations: Recommend post-acute placement for additional physical therapy services prior to discharge home       Objective     08/17/23 1156   Precautions   Precautions Fall Risk   Vitals   O2 (LPM) 2   O2 Delivery Device Silicone Nasal Cannula   Pain 0 - 10 Group   Therapist Pain Assessment Nurse Notified;Post Activity Pain Same as Prior to Activity  (feels weak)   Cognition    Cognition / Consciousness WDL   Strength Lower Body   Lower Body Strength  X   Gross Strength Generalized Weakness, Equal Bilaterally   Sitting Lower Body Exercises   Sitting Lower Body Exercises Yes   Sit to Stand   (3)   Other Treatments   Other Treatments Provided Pt completed 3 STS with FWW with CGA at EOB. Unable to transfer or complete gait training due to lack of prosthese in room. Pt educated on mobilizing more and sitting EOB for meals.   Balance   Sitting Balance (Static) Fair +   Sitting Balance (Dynamic) Fair   Standing Balance (Static) Fair -   Weight Shift Sitting Fair   Weight Shift Standing Absent   Skilled  Intervention Verbal Cuing;Sequencing   Bed Mobility    Supine to Sit Standby Assist   Sit to Supine Standby Assist   Skilled Intervention Verbal Cuing   Gait Analysis   Gait Level Of Assist Unable to Participate   Weight Bearing Status no restrictions   Functional Mobility   Sit to Stand Contact Guard Assist   Bed, Chair, Wheelchair Transfer Refused   Mobility STS x3   Skilled Intervention Tactile Cuing;Verbal Cuing   How much difficulty does the patient currently have...   Turning over in bed (including adjusting bedclothes, sheets and blankets)? 3   Sitting down on and standing up from a chair with arms (e.g., wheelchair, bedside commode, etc.) 3   Moving from lying on back to sitting on the side of the bed? 3   How much help from another person does the patient currently need...   Moving to and from a bed to a chair (including a wheelchair)? 2   Need to walk in a hospital room? 1   Climbing 3-5 steps with a railing? 1   6 clicks Mobility Score 13   Activity Tolerance   Sitting Edge of Bed 15 min   Patient / Family Goals    Patient / Family Goal #1 to go home   Goal #1 Outcome Goal not met   Short Term Goals    Short Term Goal # 1 pt will go sit<>stand w/LRD w/SPV in 6tx for safe d/c home   Goal Outcome # 1 Progressing as expected   Short Term Goal # 2 pt will transfer bed<>chair w/LRD w/spv in 6tx   Goal Outcome # 2 Goal not met   Short Term Goal # 3 pt will mobilize in w/c for 150ft w/spv in 6tx for safe d/c home   Goal Outcome # 3 Goal not met   Education Group   Education Provided Role of Physical Therapist   Role of Physical Therapist Patient Response Patient;Acceptance;Explanation;Demonstration;Verbal Demonstration;Action Demonstration   Physical Therapy Treatment Plan   Physical Therapy Treatment Plan Continue Current Treatment Plan   Anticipated Discharge Equipment and Recommendations   DC Equipment Recommendations Unable to determine at this time   Discharge Recommendations Recommend post-acute placement  for additional physical therapy services prior to discharge home   Interdisciplinary Plan of Care Collaboration   IDT Collaboration with  Nursing   Patient Position at End of Therapy In Bed;Call Light within Reach;Tray Table within Reach   Collaboration Comments PT session   Session Information   Date / Session Number  8/17-2(2/3, 8/21)

## 2023-08-17 NOTE — DISCHARGE PLANNING
Case Management Discharge Planning    Admission Date: 8/12/2023  GMLOS: 3.9  ALOS: 5    6-Clicks ADL Score: 17  6-Clicks Mobility Score: 13  PT and/or OT Eval ordered: Yes  Post-acute Referrals Ordered: Yes  Post-acute Choice Obtained: Yes  Has referral(s) been sent to post-acute provider:  Yes    Anticipated Discharge Dispo: Discharge Disposition: D/T to SNF with Medicare cert in anticipation of skilled care (03)    DME Needed: No    Action(s) Taken: Updated Provider/Nurse on Discharge Plan  Patient discussed with team during IDT rounds. Patient is not medically cleared for discharge at this time. Patient voiced concerns in going home to N. Pending updated PT/OT evaluation. Physician in agreement with post acute. Choice received, referrals sent to preferred SNFs.    Escalations Completed: None    Medically Clear: No    Next Steps: SNF acceptance     Barriers to Discharge: Pending Placement

## 2023-08-17 NOTE — PROGRESS NOTES
"Hospital Medicine Daily Progress Note    Date of Service  8/17/2023    Chief Complaint  Richard Hubbard II is a 48 y.o. male admitted 8/12/2023 with SOB    Hospital Course  Per notes, \"48 y.o. 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  On ant leaflet on the Mitral is calcified mass 3 cm.     CXR c/w pulm edema   , creatinine 2.66, lfts elevated, pro BNP 07189  EKG pt now has LBBB which is new     I discussed the plan of care with   D/w DR. Ivey from cardiology the ECHO findings of 3 weeks ago and pt status, recommends no need to transfer to [facility] at this time   Optimize cardiac status with diuresis and if worsens to transfer to [facility] for further evaluation by cardiology     In ER he received 300 cc of fluid, vanc and zosyn     Pt being admitted to [facility] ER ICU      8/13: ECHO yesterday is different readings than the [place] ECHO  \"Normal left ventricular chamber size.  The left ventricular ejection fraction is visually estimated to be 55%.  Normal right ventricular size and systolic function.  Moderate mitral regurgitation.  Moderate aortic valve stenosis.  Vmax is3.6  m/s.  Moderate aortic insufficiency.  Estimated right ventricular systolic pressure is 50 mmHg.\"     AI is quantified as moderate now  Mitral calcified mass is read as possible atrial mass  Diastolic heart failure Grade I  Do think pt weill need a MIA  Consult rounding cardiologist on Monday  Pt feels better with diuresis.\"    Interval Problem Update  Per notes, \"8/14: Patient seen at bedside this morning.  Overall the patient feels better.  I did consult and discussed case with cardiology with Dr. Clements who " "recommends only to continue with diuresis for now.  If the patient decompensates the patient would potentially require transfer to [facility] for further management and care cardiology.  We have ordered right upper quadrant ultrasound to evaluate for liver function test, however I suspect this is most likely due to cardiac congestion we will continue with diuresis for now.  I have also ordered hepatitis panel.  Patient denying abdominal pain at this time.    --UPDATE: Worsening AST, hepatitis panel pending as well as RUQ-US. We have consulted GI, we appreciate further recommendations.\"    8/16: Patient had remarkable improvement and LFTs overnight.  Overall joint pain has improved as well.  We will continue to monitor this more day if continues to improve likely DC in the next 24 to 48 hours.    8/17: Patient is actually doing quite well, however was reevaluated by PT/OT today who is now recommending skilled nursing, case management working on this.  Patient is cleared for discharge once bed is available.    I have discussed this patient's plan of care and discharge plan at IDT rounds today with Case Management, Nursing, Nursing leadership, and other members of the IDT team.    Consultants/Specialty  cardiology - Dr Clements    Code Status  Full Code    Disposition  The patient is not medically cleared for discharge to home or a post-acute facility.  Anticipate discharge to: skilled nursing facility      Review of Systems  Review of Systems   Constitutional:  Positive for malaise/fatigue. Negative for chills and fever.   HENT:  Negative for hearing loss and nosebleeds.    Eyes:  Negative for blurred vision and double vision.   Respiratory:  Positive for shortness of breath.    Cardiovascular:  Positive for leg swelling. Negative for chest pain and palpitations.   Gastrointestinal:  Negative for abdominal pain, heartburn and vomiting.   Genitourinary:  Negative for dysuria and urgency.   Musculoskeletal:  Negative for back " pain and falls.   Skin:  Negative for itching and rash.   Neurological:  Negative for dizziness and headaches.   Psychiatric/Behavioral:  Negative for substance abuse. The patient is not nervous/anxious.    All other systems reviewed and are negative.       Physical Exam  Temp:  [36.4 °C (97.6 °F)-36.8 °C (98.2 °F)] 36.6 °C (97.8 °F)  Pulse:  [77-89] 81  Resp:  [16-18] 16  BP: ()/(49-61) 102/53  SpO2:  [84 %-95 %] 92 %    Physical Exam  Vitals and nursing note reviewed.   Constitutional:       Appearance: He is obese. He is ill-appearing.   HENT:      Head: Normocephalic and atraumatic.   Cardiovascular:      Rate and Rhythm: Normal rate and regular rhythm.      Heart sounds: Murmur heard.   Pulmonary:      Breath sounds: Rales present.      Comments: On supplemental oxygen  Abdominal:      General: Abdomen is flat. Bowel sounds are normal. There is no distension.      Palpations: Abdomen is soft.      Tenderness: There is no abdominal tenderness.   Musculoskeletal:      Cervical back: Normal range of motion and neck supple.      Right lower leg: Edema present.      Comments: Left BKA   Skin:     General: Skin is warm.   Neurological:      General: No focal deficit present.      Mental Status: He is alert and oriented to person, place, and time.         Fluids    Intake/Output Summary (Last 24 hours) at 8/17/2023 1515  Last data filed at 8/17/2023 1155  Gross per 24 hour   Intake 902 ml   Output 2735 ml   Net -1833 ml       Laboratory  Recent Labs     08/15/23  0103 08/16/23  0158   WBC 6.4 3.7*   RBC 3.51* 3.55*   HEMOGLOBIN 9.9* 10.1*   HEMATOCRIT 30.3* 31.0*   MCV 86.3 87.3   MCH 28.2 28.5   MCHC 32.7 32.6   RDW 46.9 46.9   PLATELETCT 211 213   MPV 10.0 9.8     Recent Labs     08/15/23  0103 08/16/23  0158 08/17/23  0148   SODIUM 129* 131* 135   POTASSIUM 3.2* 3.6 4.1   CHLORIDE 90* 90* 94*   CO2 28 33 35*   GLUCOSE 86 130* 117*   BUN 24* 16 16   CREATININE 1.28 1.02 0.89   CALCIUM 7.6* 8.0* 8.3*     Recent  "Labs     08/15/23  0103   INR 1.22*               Imaging  US-LIVER AND VESSELS COMPLETE (COMBO)   Final Result      Grossly unremarkable liver doppler but evaluation is very limited due to body habitus and poor breath-hold.      EC-ECHOCARDIOGRAM COMPLETE W/O CONT   Final Result      DX-CHEST-PORTABLE (1 VIEW)   Final Result      1.  Diffuse mixed interstitial and airspace opacities with enlarged cardiac silhouette could be related to edema, however multifocal pneumonia is not excluded.           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 Continue with diuresis  Denying abdominal pain  Hepatitis panel negative for acute findings.  US-RUQ negative for acute findings  Improving, will need outpatient follow-up    Cardiorenal syndrome- (present on admission)  Assessment & Plan  Continue diuresis  Creatinine continues to improve  If worsening we will consider consulting nephrology    Hyponatremia- (present on admission)  Assessment & Plan  In the setting of volume overload  Monitor  improving    Generalized weakness  Assessment & Plan  PT/OT eval and treat-recommending postacute " care    Chronic pain syndrome- (present on admission)  Assessment & Plan  Continue home pain medications    Rheumatoid arthritis (HCC)- (present on admission)  Assessment & Plan  Hx of  Patient on Humira  Both ESR and CRP elevated .  Prednisone 40 mg daily  Pain management with home morphine  Improving         VTE prophylaxis: heparin    I have performed a physical exam and reviewed and updated ROS and Plan today (8/17/2023). In review of yesterday's note (8/16/2023), there are no changes except as documented above.

## 2023-08-17 NOTE — PROGRESS NOTES
12-hour chart check complete.    Monitor Summary  Rhythm: SR with 1° AVB  Rate: 72-83  Ectopy: Occasional trigeminy; occasional PVCs per monitor tech summary  Measurements: 0.22/0.10/0.40

## 2023-08-18 NOTE — CARE PLAN
The patient is Stable - Low risk of patient condition declining or worsening    Shift Goals  Clinical Goals: pain control, work with PT/OT  Patient Goals: sleep  Family Goals: unable to assess    Progress made toward(s) clinical / shift goals:  schedule pain medication continued, patient worked with PT/OT today, recommending SNF      Problem: Knowledge Deficit - Standard  Goal: Patient and family/care givers will demonstrate understanding of plan of care, disease process/condition, diagnostic tests and medications  Outcome: Progressing     Problem: Fall Risk  Goal: Patient will remain free from falls  Outcome: Progressing     Problem: Skin Integrity  Goal: Skin integrity is maintained or improved  Outcome: Progressing     Problem: Pain - Standard  Goal: Alleviation of pain or a reduction in pain to the patient’s comfort goal  Outcome: Progressing     Problem: Hemodynamics  Goal: Patient's hemodynamics, fluid balance and neurologic status will be stable or improve  Outcome: Progressing       Patient is not progressing towards the following goals:

## 2023-08-18 NOTE — PROGRESS NOTES
Telemetry Shift Summary    Rhythm SR  HR Range 73-79  Ectopy roPVC, rTrig, rBig  Measurements 0.22/0.08/0.38        Normal Values  Rhythm SR  HR Range    Measurements 0.12-0.20 / 0.06-0.10  / 0.30-0.52

## 2023-08-18 NOTE — DISCHARGE PLANNING
[facility]    Per [facility] PAR, patient has met [facility] OOP threshold, no anticipated copay for IRF admission.  Attempted to reach Jeovanny on his cell phone, no answer.      Was able to speak by phone with Jeovanny's spouse Katiana to discuss IRF referral.   Katiana is familiar with [facility], Jeovanny is former patient.  Reviewd [facility] insurance coverage, with update OOP threshold met, anticipate no copay for rehab admission.  Katiana is aware case under consideration by [facility], authorization pending.    Reviewed current visitor policies, including expectation for family training.  Provided TCC contact information.  Katiana aware potential admission tomorrow pending insurance authorization and medical clearance.

## 2023-08-18 NOTE — DISCHARGE PLANNING
Case Management Discharge Planning    Admission Date: 8/12/2023  GMLOS: 3.9  ALOS: 6      Anticipated Discharge Dispo: Discharge Disposition: D/T to SNF with Medicare cert in anticipation of skilled care (03)    DME Needed: No    Action(s) Taken:     Pt's case discussed during morning rounds. Pt is medically cleared for SNF transfer    Called [facility] and spoke with Rafaela, she will review pt's referral    VM left for [facility] admissions    Spoke with [facility] admissions, they will review referral and speak with spouse    1000- Received call from ciera Russo/ [facility]. She's running insurance benefit     Escalations Completed: None    Medically Clear: Yes    Next Steps: F/U with SNF    Barriers to Discharge: Pending Placement    Is the patient up for discharge tomorrow: No

## 2023-08-18 NOTE — DISCHARGE PLANNING
"HTH/SCP TCN chart review completed. Collaborated with SW. Current discharge considerations are SNF, pending acceptance, as patient is medically clear (per SW note on 8/18 at 9:06).  No further TCN needs.     TCN will continue to follow and collaborate with discharge planning team as additional post acute needs arise. Thank you.    Completed:  PT/OT recommend post acute placement - 8/17  Choice obtained: HH, DME(O2), SNF  [facility] (1)  [facility] (2)  [facility] (3)   [facility] referral not sent, likely post acute placement.     *Update*  SW reports [facility] can take today if patient able to afford co=pays.  Patient seen at bedside, report he can't afford SNF co-pays, wants to try IRF.  Choice obtained.  Patient reports \"it was free last time, so should be good.\"  TCN expressed unsure of cost at this time, and deferred to billing.  TCN informed SW.  D/c plan post acute vs. Home with home health depending on patient decision. Noted patient is medically cleared.   "

## 2023-08-18 NOTE — CONSULTS
Physical Medicine & Rehabilitation Chart Review      Please note that this is a chart review.    History of Present Illness:  Patient is a 48 y.o. male with a past medical history significant for GERD, HTN, BPD, BKA, Depression, and CHF admitted to [facility] on 8/12/2023  1:35 PM with CHF exacerbation. Patient well known to rehab services as previously admitted to [facility] in March. He was treated for CHF exacerbation with IV lasix and monitored as also had KATERYNA. His Cr has slowly trended to normal. His LFTs have also improved. He was evaluated by therapy and currently not as his prior level of function.       Past Medical History:  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    Hemorrhagic disorder (HCC)     hx of bleeding ulcers    History of bleeding ulcers 2/12/2015    Hypertension 05/03/2021    pt states well controlled on meds    Insomnia 5/21/2009    Migraine 5/21/2009    Morning headache     Obesity, morbid (HCC) 5/21/2009    Osteomyelitis ([facility]) 10/7/2019    Pain 09/2019    Chronic knee and back pain    Pubic ramus fracture ([facility]) 9/28/2019    Rheumatoid arteritis ([facility]) 09/2019    knee, feet right hand    Sleep apnea 5/21/2009    Did not tolerate cpap, does not use it    Snoring        Past Surgical History:  Past Surgical History:   Procedure Laterality Date    KNEE AMPUTATION BELOW Left 3/9/2023    Procedure: AMPUTATION,  BELOW KNEE;  Surgeon: Wayne Chambers M.D.;  Location: SURGERY [facility];  Service: Orthopedics    PB TOTAL KNEE ARTHROPLASTY Right 7/22/2020    Procedure: ARTHROPLASTY, KNEE, TOTAL;  Surgeon: Temo Pires M.D.;  Location: SURGERY [facility];  Service: Orthopedics    TENDON TRANSFER Right 1/22/2020    Procedure: RIGHT DISTAL ULNA RESECTION AND EXTENSOR TENDON TRANSFER;  Surgeon: Marine Rushing M.D.;  Location: [facility]ERY [facility];  Service: Orthopedics    OTHER ORTHOPEDIC SURGERY Right 2020    total right knee replacement    IRRIGATION & DEBRIDEMENT ORTHO Left 10/9/2019    Procedure: IRRIGATION AND DEBRIDEMENT, WOUND - PELVIC OSTEOMYELITIS;  Surgeon: You Olivares M.D.;  Location: SURGERY [facility];  Service: Orthopedics    OTHER SURGICAL PROCEDURE  2019    osteomelitis of pelvis cleaned    GASTRIC BYPASS LAPAROSCOPIC  2000    Lucila en y    CHOLECYSTECTOMY  2000    OTHER SURGICAL PROCEDURE      repair of broken penis       Family History:  Family History   Problem Relation Age of Onset    Hypertension Mother     Arthritis Mother     Depression Mother     Cancer Father         bladder    Schizophrenia Father     Cancer Maternal Grandmother         breast    Heart Disease Maternal Grandmother     Psychiatric Illness Other     Stroke Maternal Aunt     Other Neg Hx         no connective tissue disorders or known aortic disease       Medications:  Scheduled Medications   Medication Dose Frequency    dapagliflozin propanediol  10 mg DAILY    predniSONE  40 mg DAILY    potassium chloride SA  40 mEq BID    senna-docusate  2 Tablet BID    heparin  5,000 Units Q8HRS    furosemide  40 mg BID DIURETIC    morphine ER  15 mg BID    morphine  30 mg 4X/DAY    DULoxetine  60 mg DAILY     PRN medications: ondansetron, senna-docusate **AND** polyethylene glycol/lytes **AND** magnesium hydroxide **AND** bisacodyl    Allergies:  Nsaids, Diphenhydramine, Mirtazapine, Penicillins, and  Promethazine    Assessment & Plan:  The patient presents functional deficits in mobility and self-care, and Minimal  de-conditioning. Patient was previously Independent using Wheelchair living with Spouse, Child Less than 18 Years of Age in a 1 Story House with 2 (2 steps in addition to ramp entry) step(s) to enter. Per most recent PT note they are Unable to Participate for mobility  and Moderate Assist for transfers.  Per most recent OT note they are Moderate Assist at LB dressing, Moderate Assist at toileting, and   at bathing.     The patient's current diet is:  Current Diet Order   Procedures    Diet Order Diet: Level 7 - Easy to Chew (advance as tolerated); Liquid level: Level 0 - Thin; Second Modifier: (optional): Renal; Fluid modifications: (optional): 1500 ml Fluid Restriction       Patient would be short stay for cardiac rehabilitation after CHF exacerbation. The patient is a Good candidate for an acute inpatient rehabilitation program with a coordinated program of care at an intensity and frequency not available at a lower level of care.     Note: This recommendation requires that patient has at least CGA/Minimal Assistance needs in at least two therapy disciplines.  If patient progresses to no longer need CGA/Lisa with at least two therapy disciplines they may be more appropriate for Skilled nursing facility versus home with home health.      This recommendation is substantiated by the patient's current medical condition with intervention and assessment of medical issues requiring an acute level of care for patient's safety and maximum outcome. A coordinated program of care will be provided by an interdisciplinary team including physical therapy, occupational therapy, hospitalist, physiatry, and rehab nursing. Rehab goals include improved cognition, mobility, self-care management, strength and conditioning/endurance, pain management, bowel and bladder management, mood and affect, and safety with  independent home management including caregiver training. Estimated length of stay is approximately 7-14 days. Rehab potential: Good. Disposition: to pre-morbid independent living setting with supportive care of patient's spouse. We will continue to follow with you in anticipation of discharge to acute inpatient rehabilitation when medically stable to do so at the discretion of the attending physician. Thank you for allowing us to participate in this patient's care. Please call with any questions regarding this recommendation.    ____________________________________    T. Loi Pisano MD/PhD  [facility]   [facility]   ____________________________________

## 2023-08-18 NOTE — DISCHARGE PLANNING
[facility] Transitional Care Coordination    Referral from:  Dr. Montes  Insurance Provider on Facesheet:  [facility]  Potential Rehab diagnosis:  Cardiac    Chart review indicates patient has ongoing medical management and therapy needs to possibly meet inpatient rehab facility criteria with the goal of returning to community.      D/C Support:  Spouse    Physiatry to consult per protocol.      Thank you for the referral.

## 2023-08-18 NOTE — CARE PLAN
The patient is Stable - Low risk of patient condition declining or worsening    Shift Goals  Clinical Goals: pain management, pending placement  Patient Goals: rest and comfort  Family Goals: unable to assess    Progress made toward(s) clinical / shift goals: Pt educated on scheduled pain management and pain scale rating, pt verbalizes understanding. All questions answered, no further needs at this time.      Problem: Knowledge Deficit - Standard  Goal: Patient and family/care givers will demonstrate understanding of plan of care, disease process/condition, diagnostic tests and medications  Outcome: Progressing     Problem: Pain - Standard  Goal: Alleviation of pain or a reduction in pain to the patient’s comfort goal  Outcome: Progressing       Patient is not progressing towards the following goals:

## 2023-08-18 NOTE — PROGRESS NOTES
"Hospital Medicine Daily Progress Note    Date of Service  8/18/2023    Chief Complaint  Richard Hubbard II is a 48 y.o. male admitted 8/12/2023 with SOB    Hospital Course  Per notes, \"48 y.o. male who presented 8/12/2023 with worsening SOB Hx of RA on humira known previous hx of mild AI 2019 , hx of paroxysmal afib s/p cardioversion in 2019, not anticoagulated, BKA on the left now with severe   Was at [facility] 7/16/2023 with cellulitis of the right leg and an echo was done at that time showed severe AR, moderate AS and mild to moderate MR.  On ant leaflet on the Mitral is calcified mass 3 cm.     CXR c/w pulm edema   , creatinine 2.66, lfts elevated, pro BNP 07366  EKG pt now has LBBB which is new     I discussed the plan of care with   D/w DR. Ivey from cardiology the ECHO findings of 3 weeks ago and pt status, recommends no need to transfer to [facility] at this time   Optimize cardiac status with diuresis and if worsens to transfer to [facility] for further evaluation by cardiology     In ER he received 300 cc of fluid, vanc and zosyn     Pt being admitted to [facility] ICU      8/13: ECHO yesterday is different readings than the [place] ECHO  \"Normal left ventricular chamber size.  The left ventricular ejection fraction is visually estimated to be 55%.  Normal right ventricular size and systolic function.  Moderate mitral regurgitation.  Moderate aortic valve stenosis.  Vmax is3.6  m/s.  Moderate aortic insufficiency.  Estimated right ventricular systolic pressure is 50 mmHg.\"     AI is quantified as moderate now  Mitral calcified mass is read as possible atrial mass  Diastolic heart failure Grade I  Do think pt weill need a MIA  Consult rounding cardiologist on Monday  Pt feels better with diuresis.\"    Interval Problem Update  Per notes, \"8/14: Patient seen at bedside this morning.  Overall the patient feels better.  I did consult and discussed case with cardiology with Dr. Clements who " "recommends only to continue with diuresis for now.  If the patient decompensates the patient would potentially require transfer to [facility] for further management and care cardiology.  We have ordered right upper quadrant ultrasound to evaluate for liver function test, however I suspect this is most likely due to cardiac congestion we will continue with diuresis for now.  I have also ordered hepatitis panel.  Patient denying abdominal pain at this time.    --UPDATE: Worsening AST, hepatitis panel pending as well as RUQ-US. We have consulted GI, we appreciate further recommendations.\"    8/16: Patient had remarkable improvement and LFTs overnight.  Overall joint pain has improved as well.  We will continue to monitor this more day if continues to improve likely DC in the next 24 to 48 hours.    8/17: Patient is actually doing quite well, however was reevaluated by PT/OT today who is now recommending skilled nursing, case management working on this.  Patient is cleared for discharge once bed is available.    8/18: Patient is medically clear for discharge, continues to progress well.  Pain improving.  Pending either skilled nursing or inpatient rehab.  No need for daily labs as they have been stable.    I have discussed this patient's plan of care and discharge plan at IDT rounds today with Case Management, Nursing, Nursing leadership, and other members of the IDT team.    Consultants/Specialty  cardiology - Dr Clements    Code Status  Full Code    Disposition  The patient is medically cleared for discharge to home or a post-acute facility.  Anticipate discharge to: an inpatient rehabilitation hospital      Review of Systems  Review of Systems   Constitutional:  Positive for malaise/fatigue. Negative for chills and fever.   HENT:  Negative for hearing loss and nosebleeds.    Eyes:  Negative for blurred vision and double vision.   Respiratory:  Positive for shortness of breath.    Cardiovascular:  Positive for leg swelling. " Negative for chest pain and palpitations.   Gastrointestinal:  Negative for abdominal pain, heartburn and vomiting.   Genitourinary:  Negative for dysuria and urgency.   Musculoskeletal:  Negative for back pain and falls.   Skin:  Negative for itching and rash.   Neurological:  Negative for dizziness and headaches.   Psychiatric/Behavioral:  Negative for substance abuse. The patient is not nervous/anxious.    All other systems reviewed and are negative.       Physical Exam  Temp:  [36.4 °C (97.6 °F)-36.7 °C (98.1 °F)] 36.4 °C (97.6 °F)  Pulse:  [75-82] 75  Resp:  [16-18] 18  BP: ()/(45-56) 93/45  SpO2:  [87 %-97 %] 94 %    Physical Exam  Vitals and nursing note reviewed.   Constitutional:       Appearance: He is obese. He is ill-appearing.   HENT:      Head: Normocephalic and atraumatic.   Cardiovascular:      Rate and Rhythm: Normal rate and regular rhythm.      Heart sounds: Murmur heard.   Pulmonary:      Breath sounds: Rales present.      Comments: On supplemental oxygen  Abdominal:      General: Abdomen is flat. Bowel sounds are normal. There is no distension.      Palpations: Abdomen is soft.      Tenderness: There is no abdominal tenderness.   Musculoskeletal:      Cervical back: Normal range of motion and neck supple.      Right lower leg: Edema present.      Comments: Left BKA   Skin:     General: Skin is warm.   Neurological:      General: No focal deficit present.      Mental Status: He is alert and oriented to person, place, and time.         Fluids    Intake/Output Summary (Last 24 hours) at 8/18/2023 1451  Last data filed at 8/18/2023 0910  Gross per 24 hour   Intake --   Output 3400 ml   Net -3400 ml       Laboratory  Recent Labs     08/16/23 0158   WBC 3.7*   RBC 3.55*   HEMOGLOBIN 10.1*   HEMATOCRIT 31.0*   MCV 87.3   MCH 28.5   MCHC 32.6   RDW 46.9   PLATELETCT 213   MPV 9.8     Recent Labs     08/16/23 0158 08/17/23  0148   SODIUM 131* 135   POTASSIUM 3.6 4.1   CHLORIDE 90* 94*   CO2 33  "35*   GLUCOSE 130* 117*   BUN 16 16   CREATININE 1.02 0.89   CALCIUM 8.0* 8.3*                     Imaging  US-LIVER AND VESSELS COMPLETE (COMBO)   Final Result      Grossly unremarkable liver doppler but evaluation is very limited due to body habitus and poor breath-hold.      EC-ECHOCARDIOGRAM COMPLETE W/O CONT   Final Result      DX-CHEST-PORTABLE (1 VIEW)   Final Result      1.  Diffuse mixed interstitial and airspace opacities with enlarged cardiac silhouette could be related to edema, however multifocal pneumonia is not excluded.           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 Continue with diuresis  Denying abdominal pain  Hepatitis panel negative for acute findings.  US-RUQ negative for acute findings  Improving, will need outpatient follow-up    Cardiorenal syndrome- (present on admission)  Assessment & Plan  Continue diuresis  Creatinine continues to improve  If worsening we will consider consulting nephrology    Hyponatremia- (present on admission)  Assessment & Plan  In the setting of volume overload  Monitor  improving    Generalized weakness  Assessment & Plan  PT/OT " eval and treat-recommending postacute care    Chronic pain syndrome- (present on admission)  Assessment & Plan  Continue home pain medications    Rheumatoid arthritis (HCC)- (present on admission)  Assessment & Plan  Hx of  Patient on Humira  Both ESR and CRP elevated .  Prednisone 40 mg daily  Pain management with home morphine  Improving         VTE prophylaxis: heparin    I have performed a physical exam and reviewed and updated ROS and Plan today (8/18/2023). In review of yesterday's note (8/17/2023), there are no changes except as documented above.

## 2023-08-18 NOTE — PREADMISSION SCREENING NOTE
"  Pre-Admission Screening Form    Patient Information:   Name: Richard Hubbard II     MRN: [id number]       : 1975      Age: 48 y.o.   Gender: male      Race: White [7]       Marital Status:  [2]  Family Contact: Katiana Hubbard Linda        Relationship: Spouse [17]  Mother [8]  Home Phone: [phone]           Cell Phone: [phone]  Advanced Directives: None  Code Status:  FULL  Current Attending Provider: Juan Carlos Montes M.D.  Referring Physician: Dr. Juan Carlos Montes      Physiatrist Consult: Dr. Loi Pisano       Referral Date: 2023  Primary Payor Source:  [facility]  Secondary Payor Source:      Medical Information:   Date of Admission to Acute Care [place]  Room Number: 3316/02  Rehabilitation Diagnosis: 0009 - Cardiac  Immunization History   Administered Date(s) Administered    Influenza Vaccine Quad Inj (Pf) 2017, 2018, 10/17/2019, 2020, 2021    Influenza Vaccine Quad Inj (Preserved) 2015, 2016    Influenza, Unspecified - HISTORICAL DATA 2020    MODERNA SARS-COV-2 VACCINE (12+) 2021, 2021, 2021    Tdap Vaccine 2020     Allergies   Allergen Reactions    Nsaids      Bleeding, \"I had a bleeding ulcer\"    Diphenhydramine Unspecified     \"I get agitated\"    Mirtazapine Unspecified     I had a hard time waking up, lacking mental focus.     Penicillins      \"Had some dizziness\"  Tolerated Unasyn 10/2019  Tolerated Zosyn 2023    Promethazine Unspecified     \"I get very agitated\"     Past Medical History:   Diagnosis Date    ADD (attention deficit disorder)     Alcohol abuse     Allergic rhinitis 2009    Anemia 2019    Anxiety 2009    Apnea, sleep     Arrhythmia     afib when hospitaized for infection    Back pain 2009    Bipolar disorder (HCC)     Bleeding ulcer 2009    Bleeding ulcer 2009    History of anemia-now on nexium  "    Bowel habit changes     constipation related to narcotics    Daytime sleepiness     Depression 5/21/2009    Eczema 5/21/2009    GERD (gastroesophageal reflux disease)     Heart burn     on meds    Heart murmur     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    Hemorrhagic disorder (HCC)     hx of bleeding ulcers    History of bleeding ulcers 2/12/2015    Hypertension 05/03/2021    pt states well controlled on meds    Insomnia 5/21/2009    Migraine 5/21/2009    Morning headache     Obesity, morbid ([facility]) 5/21/2009    Osteomyelitis ([facility]) 10/7/2019    Pain 09/2019    Chronic knee and back pain    Pubic ramus fracture ([facility]) 9/28/2019    Rheumatoid arteritis ([facility]) 09/2019    knee, feet right hand    Sleep apnea 5/21/2009    Did not tolerate cpap, does not use it    Snoring      Past Surgical History:   Procedure Laterality Date    KNEE AMPUTATION BELOW Left 3/9/2023    Procedure: AMPUTATION, BELOW KNEE;  Surgeon: Wayne Chambers M.D.;  Location: [facility];  Service: Orthopedics    PB TOTAL KNEE ARTHROPLASTY Right 7/22/2020    Procedure: ARTHROPLASTY, KNEE, TOTAL;  Surgeon: Temo Pires M.D.;  Location: [facility];  Service: Orthopedics    TENDON TRANSFER Right 1/22/2020    Procedure: RIGHT DISTAL ULNA RESECTION AND EXTENSOR TENDON TRANSFER;  Surgeon: Marine Rushing M.D.;  Location: [facility];  Service: Orthopedics    OTHER ORTHOPEDIC SURGERY Right 2020    total right knee replacement    IRRIGATION & DEBRIDEMENT ORTHO Left 10/9/2019    Procedure: IRRIGATION AND DEBRIDEMENT, WOUND - PELVIC OSTEOMYELITIS;  Surgeon: You Olivares M.D.;  Location: [facility];  Service: Orthopedics    OTHER SURGICAL PROCEDURE  2019    osteomelitis of pelvis cleaned    GASTRIC BYPASS LAPAROSCOPIC  2000    Lucila en y    CHOLECYSTECTOMY  2000    OTHER SURGICAL PROCEDURE      repair of broken penis       History Leading to Admission, Conditions that Caused the Need for Rehab (CMS):  "    Sampson Menezes M.D.  Physician  Pulmonary  H&P     Signed  Date of Service:  8/12/2023  3:19 PM    Pulmonary History & Physical Note     Date of Service  8/12/2023     Primary Care Physician  Johnathan Dover M.D.      Code Status  Full Code     Chief Complaint    Chief Complaint  Patient presents with   Weakness      Present for past week; pt reports normally ambulates w/ prosthetic leg \"I don't think I can walk around right now with how I feel\"   Shortness of Breath      Present past few days; pt spouse reports recent hospitalization mid July that showed \"fluid on lungs\"     History of Presenting Illness  Richard Hubbard II is a 48 y.o. male who presented 8/12/2023 with worsening SOB  Hx of RA on humira known previous hx of mild AI 2019 , hx of paroxysmal afib s/p cardioversion in 2019, not anticoagulated, BKA on the left now with severe   Was at [facility] 7/16/2023 with cellulitis of the right leg and an echo was done at that time showed severe AR, moderate AS and mild to moderate MR.  On ant leaflet on the Mitral is calcified mass 3 cm.     CXR c/w pulm edema   , creatinine 2.66, lfts elevated, pro BNP 56458  EKG pt now has LBBB which is new     I discussed the plan of care with   D/w DR. Ivey from cardiology the ECHO findings of 3 weeks ago and pt status, recommends no need to transfer to regional at this time   Optimize cardiac status with diuresis and if worsens to transfer to regional for further evaluation by cardiology     In ER he received 300 cc of fluid, vanc and zosyn     Pt being admitted to [facility] ICU      Review of Systems  Review of Systems   Constitutional:  Positive for malaise/fatigue.   HENT: Negative.     Eyes: Negative.    Respiratory:  Positive for shortness of breath.    Cardiovascular:  Positive for leg swelling.   Gastrointestinal: Negative.    Genitourinary: Negative.    Musculoskeletal: Negative.    Skin: Negative.    Neurological:  Positive " for weakness.   Endo/Heme/Allergies: Negative.    Psychiatric/Behavioral: Negative.        Past Medical History   has a past medical history of ADD (attention deficit disorder), Alcohol abuse, Allergic rhinitis (5/21/2009), Anemia (09/2019), Anxiety (5/21/2009), Apnea, sleep, Arrhythmia, Back pain (5/21/2009), Bipolar disorder ([facility]), Bleeding ulcer (5/21/2009), Bleeding ulcer (5/21/2009), Bowel habit changes, Daytime sleepiness, Depression (5/21/2009), Eczema (5/21/2009), GERD (gastroesophageal reflux disease), Heart burn, Heart murmur, Hemorrhagic disorder ([facility]), History of bleeding ulcers (2/12/2015), Hypertension (05/03/2021), Insomnia (5/21/2009), Migraine (5/21/2009), Morning headache, Obesity, morbid (HCC) (5/21/2009), Osteomyelitis ([facility]) (10/7/2019), Pain (09/2019), Pubic ramus fracture ([facility]) (9/28/2019), Rheumatoid arteritis ([facility]) (09/2019), Sleep apnea (5/21/2009), and Snoring.     Surgical History   has a past surgical history that includes gastric bypass laparoscopic (2000); irrigation & debridement ortho (Left, 10/9/2019); other surgical procedure; other surgical procedure (2019); pr total knee arthroplasty (Right, 7/22/2020); cholecystectomy (2000); tendon transfer (Right, 1/22/2020); other orthopedic surgery (Right, 2020); and knee amputation below (Left, 3/9/2023).      Family History  family history includes Arthritis in his mother; Cancer in his father and maternal grandmother; Depression in his mother; Heart Disease in his maternal grandmother; Hypertension in his mother; Psychiatric Illness in an other family member; Schizophrenia in his father; Stroke in his maternal aunt.   Family history reviewed with patient. There is no family history that is pertinent to the chief complaint.      Social History   reports that he has been smoking cigarettes and cigars. He has been smoking an average of .25 packs per day. He has never used smokeless tobacco. He reports that he does not drink alcohol and does  "not use drugs.     Allergies    Allergies  Allergen Reactions   Nsaids        Bleeding, \"I had a bleeding ulcer\"   Diphenhydramine Unspecified      \"I get agitated\"   Mirtazapine Unspecified      I had a hard time waking up, lacking mental focus.    Penicillins        \"Had some dizziness\"  Tolerated Unasyn 10/2019  Tolerated Zosyn 02/2023   Promethazine Unspecified      \"I get very agitated\"      Assessment/Plan:  Justification for Admission Status  I anticipate this patient will require at least two midnights for appropriate medical management, necessitating inpatient admission because severe decompensated AR     Patient will need a ICU (Adult and Pediatrics) bed on MEDICAL service .  The need is secondary to pressors and lasix.     * Fluid overload- (present on admission)  Assessment & Plan  His aortic regur is now severe compared to 2019 when he was mild  EF 3 weeks ago 53%  Now with LBBB  CPAP for support if needed  R[place]a  Lasix Bid   Pressor support  Meets criteria for valvular surgery based on symptoms and severity of AI - will defer to cardiology  BP too long for ACE  Will trend troponins  Repeat ECHO ordered as urgent     KATERYNA (acute kidney injury) (HCC)- (present on admission)  Assessment & Plan  Based on clinical exam and chart review, likely due to cardiorenal disease  MAP 65 and systolic 100   Use levophed     Left bundle branch block  Assessment & Plan  New onset likely due to worsening AI     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)  Assessment & Plan  Seen on ECHO at [place] 7/2023  Calcified on ant leaflet - 2.2 cm by 2.3 cm on the base of the anterior leaflet,   Reassess with ECHO  Assume may need a MIA but will defer to cardiology to assess  Check esr as a few months ago was normal want to ensure we are not concerned for endocarditis     Elevated LFTs- (present on admission)  Assessment & Plan  Due to congestion secondary to cardiac cause  monitor     Cardiorenal syndrome- (present on " admission)  Assessment & Plan  Due to decompensated AI  Lasix bid   Blood pressure support to improve flow     Hyponatremia- (present on admission)  Assessment & Plan  Hypervolemia hyponatremia  Will check urine lytes  Monitor closely   Mental status is intact     Rheumatoid arthritis (HCC)- (present on admission)  Assessment & Plan  Known hx on humira     VTE prophylaxis: heparin ppx     CC time managing decompensated cardiac valvular disease is 42 mins and this does not include procedure time          Tarik Moreno M.D.  Physician  Gastroenterology  Consults     Addendum  Date of Service:  8/14/2023  1:48 PM    Addendum      Gastroenterology Initial Consult Note     Author: Tarik postendoscopy M.D. with MARTÍN Kohler Date & Time Created: 8/14/2023 1:48 PM       Patient ID:  Name:             Richard Hubbard  YOB: 1975  Age:                 48 y.o.  male  MRN:               [id number]     Referring Provider:  You King MD     Presenting Chief Complaint:  Elevated liver enzymes     History of Present Illness:    This is a very pleasant 48 y.o. male with the past medical history that includes bipolar disorder, Lucila-en-Y gastric bypass surgery, rheumatoid arthritis on Humira, GERD, eczema, ADD, paroxysmal atrial fibrillation status cardiovascular seen in 2019, below the knee amputation who is seen in consultation for elevated liver enzymes.  He was admitted on 8/12/2023 with complaints of shortness of breath and weakness. On this admission here, noted to have sodium 120, creatinine 2.66 initially.  CMP most recent with sodium 129, potassium 2.8, chloride 90, BUN 30, creatinine 1.68, calcium 8, AST 1489, 616 ALT, , T. bili 1.2.  Initial BNP over 25,000.  He has been diagnosed by the primary team with acute diastolic heart failure.  It seems he might be a candidate for valve replacement surgery, but no current plans for this.  He is being diuresed.     He was just  recently hospitalized at an outside facility [facility] on 7/16/2023 with findings of cellulitis of the right leg.  Echocardiogram was done at that time which showed severe aortic regurgitation, moderate aortic stenosis, and mild to moderate mitral regurgitation.  Treated with IV Unasyn at the outside facility and then this was transitioned to Augmentin on 7/20/2023 for 11 days.      Looking back at historical data to 2019 within the system he has had intermittently elevated AST and ALT with mild elevation.  Alkaline phosphatase more recently has increased up to 400.  Bilirubin has been normal in the past.  He also had an ultrasound from 2019 which demonstrated a possible dilated portal vein.    MDM (Data Review):   -Records reviewed and summarized in current documentation  -I personally reviewed and interpreted the laboratory results  -I personally reviewed the radiology images     Medical Decision Making, by Problem:  Active Hospital Problems    Diagnosis     Hypokalemia [E87.6]     Acute diastolic heart failure (HCC) [I50.31]     Cardiorenal syndrome [I13.10]     Elevated liver function tests [R79.89]     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]     KATERYNA (acute kidney injury) (HCC) [N17.9]     Hyponatremia [E87.1]     Generalized weakness [R53.1]     Chronic pain syndrome [G89.4]     Rheumatoid arthritis (HCC) [M06.9]       Assessment/Recommendations:  48-year-old male patient history of RA on Humira gastric bypass surgery, chronic pain syndrome who was recently hospitalized at [facility] last month with findings of right lower extremity cellulitis discharged on Augmentin and abnormal echocardiogram. He is now admitted to this facility 8/12/2023 with acute diastolic heart failure, cardiorenal syndrome, and elevated liver enzymes.  Suspicion for acute hepatic congestion versus ischemic hepatitis versus drug-induced liver injury versus flare of an underlying liver issue such as autoimmune hepatitis. He denies alcohol  use     Assessment:  -Elevated liver enzymes  -Acute diastolic heart failure  -Cardiorenal syndrome  -Mitral valve versus atrial mass  -A-fib rheumatoid arthritis on Humira   -Cellulitis right lower extremity cellulitis so status post Augmentin     Plan:  -Ultrasound with dopplers of the abdomen  -AVANI, ASMA, IgG, AMA, Ceruloplasmin, Alpha-1 antitrypsin, Acute Hepatitis panel  -Avoid hepatotoxins  -Daily CMP, INR     GI Attending -     Patient seen, examined, chart reviewed and case discussed and plan arrived at with [email or web address].  Agree with this note.     Patient has multiple possible etiologies for his abnormal liver enzymes.  As above, I favor multifactorial heart failure but also consider drug-induced liver injury.     Tarik Moreno M.D.    MATRÍN Kohler M.D.  Physician  Cardiology  Consults     Signed  Date of Service:  8/15/2023  5:17 PM    Cardiology Initial Consult Note     Date of note:    8/15/2023       Consulting Physician: Juan Carlos Montes M.D.     Name:                          Richard Hubbard   YOB: 1975  Age:                             48 y.o.  male     MRN:                           [id number]     Reason for Consultation: Decompensated valvular cardiomyopathy     HPI:  Richard Hubbard II is a 48 y.o. male with a history of rheumatoid arthritis on Humira, immunocompromise state, history of paroxysmal atrial fibrillation s/p cardioversion in 2019 not on oral anticoagulation, L BKA from nonhealing wounds 2/2 RA and Humira, bicuspid aortic valve with moderate stenosis with valvular cardiomyopathy who was admitted to the hospital on 8/12/2023 with shortness of breath and evidence of volume overload.  Cardiology consulted for abnormal echo findings.     Upon my evaluation, patient is lying comfortably in bed.  He is on supplemental oxygen.  Denies any active chest pain, pressure, palpitations, dizziness or lightheadedness.  States  that he was admitted at [facility] last month due to lower extremity infection.  Has been feeling well since admission with active diuresis.  Denies any active nausea, vomiting, abdominal pain, headache, chest pain/tightness or orthopnea or PND.     ROS  A complete ROS was performed and is negative except for pertinent positives mentioned in HPI.     Problem list:  Principal Problem:    Acute diastolic heart failure (HCC) (POA: Yes)  Active Problems:    Chronic pain syndrome (POA: Yes)    Generalized weakness (POA: Unknown)    Hyponatremia (POA: Yes)    Cardiorenal syndrome (POA: Yes)    Elevated liver function tests (POA: Unknown)    Mitral valve mass (POA: Yes)    KATERYNA (acute kidney injury) (HCC) (POA: Yes)    Hypokalemia (POA: Unknown)    Rheumatoid arthritis (HCC) (Chronic) (POA: Yes)      Overview: On Humira  Resolved Problems:    * No resolved hospital problems. *     Recommendations:  -- Complex patient case.  -- Patient with decompensated valvular cardiomyopathy with component of HFpEF and pulmonary hypertension.  -- Difficult to assess volume status, appears not extremely overloaded.  Obtain repeat NT proBNP to monitor.  -- IV Lasix 40 mg twice daily.  If NT proBNP is significantly improved from before, decrease IV Lasix to 20 mg twice daily.  -- Has remained hypokalemic throughout hospital stay.  Recommend scheduled potassium supplements to avoid life-threatening arrhythmias with electrolyte derangements.  Goal K>4 and Mg>2.     Thank you for allowing me to participate in the care of this patient, cardiology will continue to follow.  Please contact me with any questions.     Dakota Wilson M.D.  Cardiologist, [facility] [place]             Keaton Pisano M.D.  Physician  Physical Medicine & Rehab  Consults     Signed  Date of Service:  8/18/2023  2:23 PM  Consult Orders  IP Consult For Physiatry [[id number]] ordered by Juan Carlos Montes M.D. at 08/18/23 3046    Physical Medicine  & Rehabilitation Chart Review     Please note that this is a chart review.     History of Present Illness:  Patient is a 48 y.o. male with a past medical history significant for GERD, HTN, BPD, BKA, Depression, and CHF admitted to [facility] on 8/12/2023  1:35 PM with CHF exacerbation. Patient well known to rehab services as previously admitted to [facility] in March. He was treated for CHF exacerbation with IV lasix and monitored as also had KATERYNA. His Cr has slowly trended to normal. His LFTs have also improved. He was evaluated by therapy and currently not as his prior level of function.      Assessment & Plan:  The patient presents functional deficits in mobility and self-care, and Minimal  de-conditioning. Patient was previously Independent using Wheelchair living with Spouse, Child Less than 18 Years of Age in a 1 Story House with 2 (2 steps in addition to ramp entry) step(s) to enter. Per most recent PT note they are Unable to Participate for mobility  and Moderate Assist for transfers.  Per most recent OT note they are Moderate Assist at LB dressing, Moderate Assist at toileting, and   at bathing.      The patient's current diet is:  Current Diet Order  Procedures   Diet Order Diet: Level 7 - Easy to Chew (advance as tolerated); Liquid level: Level 0 - Thin; Second Modifier: (optional): Renal; Fluid modifications: (optional): 1500 ml Fluid Restriction     Patient would be short stay for cardiac rehabilitation after CHF exacerbation. The patient is a Good candidate for an acute inpatient rehabilitation program with a coordinated program of care at an intensity and frequency not available at a lower level of care.      Note: This recommendation requires that patient has at least CGA/Minimal Assistance needs in at least two therapy disciplines.  If patient progresses to no longer need CGA/Lisa with at least two therapy disciplines they may be more appropriate for Skilled nursing facility versus home with home  health.       This recommendation is substantiated by the patient's current medical condition with intervention and assessment of medical issues requiring an acute level of care for patient's safety and maximum outcome. A coordinated program of care will be provided by an interdisciplinary team including physical therapy, occupational therapy, hospitalist, physiatry, and rehab nursing. Rehab goals include improved cognition, mobility, self-care management, strength and conditioning/endurance, pain management, bowel and bladder management, mood and affect, and safety with independent home management including caregiver training. Estimated length of stay is approximately 7-14 days. Rehab potential: Good. Disposition: to pre-morbid independent living setting with supportive care of patient's spouse. We will continue to follow with you in anticipation of discharge to acute inpatient rehabilitation when medically stable to do so at the discretion of the attending physician. Thank you for allowing us to participate in this patient's care. Please call with any questions regarding this recommendation.  ____________________________________  T. Loi Pisano MD/PhD  [facility] - Physical Medicine & Rehabilitation   [facility] - Brain Injury Medicine     Co-morbidities:  See above  Potential Risk - Complications: Contractures, Deep Vein Thrombosis, Malnutrition, Pain, Perceptual Impairment, Pneumonia, Pressure Ulcer, and Infection  Level of Risk: High    Ongoing Medical Management Needed (Medical/Nursing Needs):   Patient Active Problem List    Diagnosis Date Noted    Hypokalemia 08/14/2023    Acute diastolic heart failure (HCC) 08/12/2023    Cardiorenal syndrome 08/12/2023    Elevated liver function tests 08/12/2023    Mitral valve mass 08/12/2023    Left bundle branch block 08/12/2023    KATERYNA (acute kidney injury) (HCC) 08/12/2023    Obesity (BMI 30-39.9) 05/31/2023    Umbilical hernia without obstruction and without gangrene  "05/31/2023    Normocytic anemia 05/31/2023    S/P BKA (below knee amputation) unilateral, left ([facility]) 03/21/2023    Vitamin D deficiency 03/14/2023    Tenosynovitis of left lower leg 03/06/2023    Hyponatremia 02/28/2023    Generalized weakness 06/15/2022    Impaired mobility and ADLs 06/15/2022    Ulcer of right foot ([facility]) 06/15/2022    Chronic use of opiate drug for therapeutic purpose 01/18/2021    Chronic pain syndrome 01/18/2021    Localized osteoporosis without current pathological fracture 10/19/2020    History of osteomyelitis 08/14/2020    History of immunosuppressive therapy 08/14/2020    History of atrial fibrillation ( single episode 2019) 07/23/2020    History of cigarette smoking 07/21/2020    Aortic root dilatation ([facility]) 09/13/2019    Aortic stenosis, moderate 09/12/2019    Rheumatoid arthritis ([facility]) 09/08/2015    Chronic pain of right knee 09/08/2015    History of bleeding ulcers 02/12/2015    MONICA (obstructive sleep apnea) 03/09/2010    Mood disorder ([facility]) 05/21/2009    ELISEO (generalized anxiety disorder) 05/21/2009     Current Vital Signs:   Temperature: 36.6 °C (97.9 °F) Pulse: 77 Respiration: 18 Blood Pressure: 93/45  Weight: 93.4 kg (205 lb 14.6 oz) Height: 177.8 cm (5' 10\")  Pulse Oximetry: 96 % O2 (LPM): 2      Completed Laboratory Reports:  Recent Labs     08/16/23  0158 08/17/23  0148   WBC 3.7*  --    HEMOGLOBIN 10.1*  --    HEMATOCRIT 31.0*  --    PLATELETCT 213  --    SODIUM 131* 135   POTASSIUM 3.6 4.1   BUN 16 16   CREATININE 1.02 0.89   ALBUMIN 2.3* 2.3*   GLUCOSE 130* 117*     Additional Labs: Not Applicable    Prior Living Situation:   Housing / Facility: 1 Story House  Steps Into Home: 2 (2 steps in addition to ramp entry)  Steps In Home: 0  Lives with - Patient's Self Care Capacity: Spouse, Child Less than 18 Years of Age  Equipment Owned: 4-Wheel Walker, Front-Wheel Walker, Wheelchair, Tub Transfer Bench, Bed Side Commode, Other (Comments) (LLE prosthesis)    Prior Level of Function " / Living Situation:   Physical Therapy: Housing / Facility: [facility]  Steps Into Home: 2 (2 steps in addition to ramp entry)  Steps In Home: 0  Bathroom Set up: Bathtub / Shower Combination, Shower Curtain, Tub Transfer Bench  Equipment Owned: 4-Wheel Walker, Front-Wheel Walker, Wheelchair, Tub Transfer Bench, Bed Side Commode, Other (Comments) (LLE prosthesis)  Lives with - Patient's Self Care Capacity: Spouse, Child Less than 18 Years of Age  Bed Mobility: Independent  Transfer Status: Independent  Ambulation: Independent  Assistive Devices Used: Wheelchair  Wheelchair: Independent  Current Level of Function:   Gait Level Of Assist: Unable to Participate  Weight Bearing Status: no restrictions  Supine to Sit: Standby Assist  Sit to Supine: Standby Assist  Scooting: Standby Assist  Skilled Intervention: Verbal Cuing  Comments: HOB elevated and rails up  Sit to Stand: Contact Guard Assist  Bed, Chair, Wheelchair Transfer: Moderate Assist  Toilet Transfers: Refused  Transfer Method: Squat Pivot  Skilled Intervention: Tactile Cuing, Verbal Cuing, Postural Facilitation  Sitting in Chair: 20  Sitting Edge of Bed: 10  Standing: <1 min  Occupational Therapy:   Self Feeding: Independent  Grooming / Hygiene: Independent  Bathing: Independent  Dressing: Independent  Toileting: Independent  Medication Management: Independent  Kitchen Mobility: Independent  Home Management: Independent  Prior Level Of Mobility: Uses Wheel Chair for in Home Mobility  Housing / Facility: [address] [facility]  Occupation (Pre-Hospital Vocational): Homemaker  Current Level of Function:   Upper Body Dressing: Modified Independent  Lower Body Dressing: Moderate Assist  Toileting: Moderate Assist  Speech Language Pathology:      Rehabilitation Prognosis/Potential: Good  Estimated Length of Stay: 7-14 days    Nursing:      Rivera in Place    Scope/Intensity of Services Recommended:  Physical Therapy: 1.5 hr / day  5 days / week. Therapeutic Interventions  Required: Maximize Endurance, Mobility, Strength, and Safety  Occupational Therapy: 1.5 hr / day 5 days / week. Therapeutic Interventions Required: Maximize Self Care, ADLs, IADLs, and Energy Conservation  Rehabilitation Nursin/7. Therapeutic Interventions Required: Monitor Pain, Skin, Wound(s), Vital Signs, Intake and Output, Labs, Safety, Family Training, and Left Lower extremity BKA incision surveillance; Right foot wound care; Rivera care/maintenance; Bowel and bladder regimen; Infection control; ADL's.  Rehabilitation Physician: 3 - 5 days / week. Therapeutic Interventions Required: Medical Management  Respiratory Care: Consult. Therapeutic Interventions Required: Pulmonary Toileting, O2 Weaning, and Respiratory care per protocol.   Dietician: Consult. Therapeutic Interventions Required: Nutritional evaluation with recommendations to promote optimal health and healing.     He requires 24-hour rehabilitation nursing to manage bowel and bladder function, skin care, surgical incision, wound, nutrition and fluid intake, pulmonary hygiene, pain control, safety, medication management, and patient/family goals. In addition, rehabilitation nursing will reiterate and reinforce therapy skills and equipment use, including ADLs, as well as provide education to the patient and family. Richard Salinas Stevie AMATO is willing to participate in and is able to tolerate the proposed plan of care.    Rehabilitation Goals and Plan (Expected frequency & duration of treatment in the IRF):   Return to the Community, Modified Independent Level of Care, and Outpatient Support  Anticipated Date of Rehabilitation Admission: 2023  Patient/Family oriented IRF level of care/facility/plan:   Patient/Family willing to participate in IRF care/facility/plan:   Patient able to tolerate IRF level of care proposed:   Patient has potential to benefit IRF level of care proposed:   Comments: Not Applicable    Special Needs or Precautions -  Medical Necessity:  Safety Concerns/Precautions:  Fall Risk / High Risk for Falls, Balance, and Bed / Chair Alarm  Complex Wound Care: Right foot wound care; Left BKA surgical incision - surveillance for appropriate healing.    Pain Management  Requires Oxygen    Diet:   DIET ORDERS (From admission to next 24h)       Start     Ordered    08/16/23 1427  Diet Order Diet: Level 7 - Easy to Chew (advance as tolerated); Liquid level: Level 0 - Thin; Second Modifier: (optional): Renal; Fluid modifications: (optional): 1500 ml Fluid Restriction  ALL MEALS        Question Answer Comment   Diet: Level 7 - Easy to Chew advance as tolerated   Liquid level Level 0 - Thin    Second Modifier: (optional) Renal    Fluid modifications: (optional) 1500 ml Fluid Restriction        08/16/23 1427                    Anticipated Discharge Destination / Patient/Family Goal:  Destination: Home with Assistance Support System: Spouse  Anticipated home health services: OT, PT, and Nursing  Previously used HH service/ provider:  [facility]  Anticipated DME Needs:  To be determined  Outpatient Services:  To be determined  Alternative resources to address additional identified needs:   Future Appointments   Date Time Provider Department Center   8/24/2023  3:00 PM Meg Barnett P.A.-C. [facility] None   8/28/2023  2:40 PM Johnathan Dover M.D. [address]       Pre-Screen Completed: 8/18/2023 4:02 PM Nava Morin R.N.

## 2023-08-18 NOTE — DISCHARGE PLANNING
[facility] Transitional Care Coordination    Physiatry consult complete.  Dr. Pisano recommending good candidate for IRF level care.  Volate and Teams message to TAVO Gavin seeking [place] consideration for IRF level care.      Volate update to BEATRIS Lowery.  Will follow for auth determination.

## 2023-08-18 NOTE — DISCHARGE PLANNING
Case Management Discharge Planning    Admission Date: 8/12/2023  GMLOS: 3.9  ALOS: 6    Anticipated Discharge Dispo: Discharge Disposition: D/T to SNF with Medicare cert in anticipation of skilled care (03)    DME Needed: No    Action(s) Taken:     Received call from Rafaela Sol. She states that pt's insurance has a $750 deductible for SNF benefit and $210/day co pay.     Discussed with [facility] Romaine VO who spoke with pt. Pt indicated he wants to see if acute rehab would accept him.     Updated MD. Rehab consult placed    Spoke with [facility] and they are able to accept on Monday if pt is agreeable to SNF.   PASRR in manual review d/t mh diagnosis.     Escalations Completed: None    Medically Clear: Yes    Next Steps: Care coordination will continue to follow     Barriers to Discharge: Pending Placement    Is the patient up for discharge tomorrow: No

## 2023-08-18 NOTE — PROGRESS NOTES
Telemetry Shift Summary    Rhythm SR 1st degree  HR Range 80-86  Ectopy o-fPVC  Measurements 0.22/0.08/0.36      Normal Values  Rhythm SR  HR Range:   Measurements: 0.12-0.20/0.06-0.10/0.30-0.52  SR

## 2023-08-19 NOTE — DISCHARGE PLANNING
Case Management Discharge Planning    Admission Date: 8/12/2023  GMLOS: 3.9  ALOS: 7    6-Clicks ADL Score: 19  6-Clicks Mobility Score: 13  PT and/or OT Eval ordered: Yes  Post-acute Referrals Ordered: Yes  Post-acute Choice Obtained: Yes  Has referral(s) been sent to post-acute provider:  Yes      Anticipated Discharge Dispo: Discharge Disposition: Disch to  rehab facility or distinct part unit (62)    DME Needed: No    Action(s) Taken: [facility] Transport scheduled for 0820 at [phone] via GMT.    Escalations Completed: None    Medically Clear: Yes    Next Steps:  f/u with medical team to discuss DC needs and barriers    Barriers to Discharge: None

## 2023-08-19 NOTE — DISCHARGE PLANNING
Per tuan review insurance has authorized transfer to [facility] when medically cleared. Will follow.         Member Name:  Brad Stevie AMATO   Member Number: [id number]   Reference Number: [id number]   Approved Services: Inpatient Rehab (Internal option)   Approved Service Dates: 08/19/2023 - 08/25/2023   [facility]ing Provider: [facility]   Requested Provider: [facility]

## 2023-08-19 NOTE — CARE PLAN
The patient is Stable - Low risk of patient condition declining or worsening    Shift Goals  Clinical Goals: Pain management, SNF placement  Patient Goals: Rest and feel better  Family Goals: JI    Progress made toward(s) clinical / shift goals:    Problem: Knowledge Deficit - Standard  Goal: Patient and family/care givers will demonstrate understanding of plan of care, disease process/condition, diagnostic tests and medications  Description: Target End Date:  1-3 days or as soon as patient condition allows    Document in Patient Education    1.  Patient and family/caregiver oriented to unit, equipment, visitation policy and means for communicating concern  2.  Complete/review Learning Assessment  3.  Assess knowledge level of disease process/condition, treatment plan, diagnostic tests and medications  4.  Explain disease process/condition, treatment plan, diagnostic tests and medications  Outcome: Progressing     Problem: Hemodynamics  Goal: Patient's hemodynamics, fluid balance and neurologic status will be stable or improve  Description: Target End Date:  Prior to discharge or change in level of care    Document on Assessment and I/O flowsheet templates    1.  Monitor vital signs, pulse oximetry and cardiac monitor per provider order and/or policy  2.  Maintain blood pressure per provider order  3.  Hemodynamic monitoring per provider order  4.  Manage IV fluids and IV infusions  5.  Monitor intake and output  6.  Daily weights per unit policy or provider order  7.  Assess peripheral pulses and capillary refill  8.  Assess color and body temperature  9.  Position patient for maximum circulation/cardiac output  10. Monitor for signs/symptoms of excessive bleeding  11. Assess mental status, restlessness and changes in level of consciousness  12. Monitor temperature and report fever or hypothermia to provider immediately. Consideration of targeted temperature management.  Outcome: Progressing       Patient is not  progressing towards the following goals:

## 2023-08-19 NOTE — PROGRESS NOTES
12-hour chart check complete.    Monitor Summary  Rhythm: SR, 1st degree AVB  Rate: 73-82  Ectopy: r-o) PVC, r) bigem, PAC, trigeminy  Measurements: .20/.08/.40

## 2023-08-19 NOTE — CARE PLAN
The patient is Stable - Low risk of patient condition declining or worsening    Shift Goals  Clinical Goals: pain management, monitor I/Os  Patient Goals: rest, control pain  Family Goals: JI    Progress made toward(s) clinical / shift goals:    Problem: Knowledge Deficit - Standard  Goal: Patient and family/care givers will demonstrate understanding of plan of care, disease process/condition, diagnostic tests and medications  Outcome: Progressing     Problem: Fall Risk  Goal: Patient will remain free from falls  Outcome: Progressing     Problem: Skin Integrity  Goal: Skin integrity is maintained or improved  Outcome: Progressing       Patient is not progressing towards the following goals:

## 2023-08-19 NOTE — DISCHARGE PLANNING
Approved transfer to [facility] Dr. Coyd accepting. [facility] transportation arranged for 08/20/2023 [id number] ref [id number]. Spouse notified. Attending team notified.

## 2023-08-19 NOTE — PROGRESS NOTES
Telemetry Shift Summary    Rhythm SR  HR Range 65-77  Ectopy rCoup, r-fPVC  Measurements 0.20/0.08/0.44        Normal Values  Rhythm SR  HR Range    Measurements 0.12-0.20 / 0.06-0.10  / 0.30-0.52

## 2023-08-19 NOTE — PROGRESS NOTES
1858 received bedside report and assumed patient care. Patient sleeping in bed with unlabored breathing.     2128 patient assessment completed. Patient discussed pain has been more than usual during this hospital stay. Discussed medication, bowel movements, mobilization, and blood pressure with patient. Patient's needs and questions addressed at this time.

## 2023-08-19 NOTE — DISCHARGE SUMMARY
"Discharge Summary    CHIEF COMPLAINT ON ADMISSION  Chief Complaint   Patient presents with    Weakness     Present for past week; pt reports normally ambulates w/ prosthetic leg \"I don't think I can walk around right now with how I feel\"    Shortness of Breath     Present past few days; pt spouse reports recent hospitalization mid July that showed \"fluid on lungs\"       Reason for Admission  Dehydrated; Fatigue; Shortness of breath    Admission Date  8/12/2023    CODE STATUS  Full Code    HPI & HOSPITAL COURSE  Per notes, \"48 y.o. 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  On ant leaflet on the Mitral is calcified mass 3 cm.     CXR c/w pulm edema   , creatinine 2.66, lfts elevated, pro BNP 51741  EKG pt now has LBBB which is new     I discussed the plan of care with   D/w DR. Ivey from cardiology the ECHO findings of 3 weeks ago and pt status, recommends no need to transfer to regional at this time   Optimize cardiac status with diuresis and if worsens to transfer to regional for further evaluation by cardiology\"    Patient was initially admitted to ICU for cardiorenal syndrome.  During hospitalization patient was having cardiology and gastroenterology.  He was transferred to the ICU and both cardiac function and liver function eventually improved.  Patient will need to follow-up with gastroenterology in the outpatient setting to determine actual cause of liver function as this was not clear.  Autoimmune was suspected.  Patient also noted some joint pain and was treated with steroids for possible arthritis flare, pain improved and LFTs also improved significantly it is unclear if this was " "due to steroids or natural process.  Patient was evaluated physical therapy and Occupational Therapy who recommended postacute care placement.  Patient has been accepted to [facility] will he will transfer for further recovery.     ECHO:  \"Normal left ventricular chamber size.  The left ventricular ejection fraction is visually estimated to be 55%.  Normal right ventricular size and systolic function.  Moderate mitral regurgitation.  Moderate aortic valve stenosis.  Vmax is3.6  m/s.  Moderate aortic insufficiency.  Estimated right ventricular systolic pressure is 50 mmHg.\"    Patient was initially scheduled to be discharged on 8/19, however transport could not be arranged until 8/20.    Therefore, he is discharged in good and stable condition to an inpatient rehabilitation hospital.    The patient recovered much more quickly than anticipated on admission.    Discharge Date  8/20/2023    FOLLOW UP ITEMS POST DISCHARGE  FU with PCP   FU with cardiology   FU with GI     DISCHARGE DIAGNOSES  Principal Problem:    Acute diastolic heart failure (HCC) (POA: Yes)  Active Problems:    Rheumatoid arthritis (HCC) (Chronic) (POA: Yes)      Overview: On Humira    Chronic pain syndrome (POA: Yes)    Generalized weakness (POA: Unknown)    Hyponatremia (POA: Yes)    Cardiorenal syndrome (POA: Yes)    Elevated liver function tests (POA: Unknown)    Mitral valve mass (POA: Yes)    KATERYNA (acute kidney injury) (HCC) (POA: Yes)    Hypokalemia (POA: Unknown)  Resolved Problems:    * No resolved hospital problems. *      FOLLOW UP  Future Appointments   Date Time Provider Department Center   8/24/2023  3:00 PM Meg Barnett P.A.-C. [facility] None   8/28/2023  2:40 PM Johnathan Dover M.D. [address]     No follow-up provider specified.    MEDICATIONS ON DISCHARGE     Medication List        START taking these medications        Instructions   dapagliflozin propanediol 10 MG Tabs  Commonly known as: Farxiga   Take 1 Tablet by mouth " "every day for 30 days.  Dose: 10 mg     potassium chloride SA 10 MEQ Tbcr  Commonly known as: K-Dur   Take 1 Tablet by mouth every day for 30 days.  Dose: 10 mEq     torsemide 20 MG Tabs  Commonly known as: Demadex   Take 2 Tablets by mouth every day for 30 days.  Dose: 40 mg            CONTINUE taking these medications        Instructions   albuterol 108 (90 Base) MCG/ACT Aers inhalation aerosol   Inhale 2 Puffs every 6 hours as needed for Shortness of Breath.  Dose: 2 Puff     CALCIUM PO   Take 1 Tablet by mouth every day.  Dose: 1 Tablet     DULoxetine 60 MG Cpep delayed-release capsule  Commonly known as: Cymbalta   Take 1 Capsule by mouth every day.  Dose: 60 mg     Humira Pen 40 MG/0.4ML Pnkt  Generic drug: Adalimumab   INJECT 40MG SUBCUTANEOUSLY  EVERY 2 WEEKS     * morphine ER 15 MG Tbcr tablet  Commonly known as: Ms Contin   Take 15 mg by mouth 2 times a day.  Dose: 15 mg     * morphine 30 MG tablet  Commonly known as: MS IR   Take 30 mg by mouth 4 times a day.  Dose: 30 mg     Naloxone 4 MG/0.1ML Liqd  Commonly known as: Narcan   One spray in one nostril for overdose and call 911.     pregabalin 200 MG capsule  Commonly known as: Lyrica   Take 200 mg by mouth every day.  Dose: 200 mg     VITAMIN D PO   Take 1 Tablet by mouth every day.  Dose: 1 Tablet           * This list has 2 medication(s) that are the same as other medications prescribed for you. Read the directions carefully, and ask your doctor or other care provider to review them with you.                ASK your doctor about these medications        Instructions   predniSONE 20 MG Tabs  Commonly known as: Deltasone  Ask about: Should I take this medication?   Take 2 Tablets by mouth every day for 2 days.  Dose: 40 mg              Allergies  Allergies   Allergen Reactions    Nsaids      Bleeding, \"I had a bleeding ulcer\"    Diphenhydramine Unspecified     \"I get agitated\"    Mirtazapine Unspecified     I had a hard time waking up, lacking mental " "focus.     Penicillins      \"Had some dizziness\"  Tolerated Unasyn 10/2019  Tolerated Zosyn 02/2023    Promethazine Unspecified     \"I get very agitated\"       DIET  Orders Placed This Encounter   Procedures    Diet Order Diet: Level 7 - Easy to Chew (advance as tolerated); Liquid level: Level 0 - Thin; Second Modifier: (optional): Renal; Fluid modifications: (optional): 1500 ml Fluid Restriction     Standing Status:   Standing     Number of Occurrences:   1     Order Specific Question:   Diet:     Answer:   Level 7 - Easy to Chew [22]     Comments:   advance as tolerated     Order Specific Question:   Liquid level     Answer:   Level 0 - Thin     Order Specific Question:   Second Modifier: (optional)     Answer:   Renal [8]     Order Specific Question:   Fluid modifications: (optional)     Answer:   1500 ml Fluid Restriction [9]       ACTIVITY  As tolerated.  Weight bearing as tolerated    CONSULTATIONS  GI   Cardiology   Critical Care    PROCEDURES  none    LABORATORY  Lab Results   Component Value Date    SODIUM 133 (L) 08/19/2023    POTASSIUM 4.6 08/19/2023    CHLORIDE 91 (L) 08/19/2023    CO2 33 08/19/2023    GLUCOSE 83 08/19/2023    BUN 24 (H) 08/19/2023    CREATININE 1.02 08/19/2023    CREATININE 0.95 10/19/2010    GLOMRATE >59 10/19/2010        Lab Results   Component Value Date    WBC 9.2 08/19/2023    WBC 8.4 10/19/2010    HEMOGLOBIN 11.5 (L) 08/19/2023    HEMATOCRIT 36.2 (L) 08/19/2023    PLATELETCT 234 08/19/2023        Total time of the discharge process exceeds 45 minutes.  "

## 2023-08-20 PROBLEM — I50.43 CHF (CONGESTIVE HEART FAILURE), NYHA CLASS I, ACUTE ON CHRONIC, COMBINED (HCC): Status: ACTIVE | Noted: 2023-01-01

## 2023-08-20 NOTE — H&P
Physical Medicine & Rehabilitation  History and Physical (H&P)  &     Post Admission Physician Evaluation (ZACHARIAH)       Date of Admission: 8/20/23  Date of Service: 8/20/2023   Richard Hubbard LM  Room/bed 5-1     [facility] Code to Support Admission: 0009 - Cardiac  Etiologic Diagnosis: Acute diastolic heart failure (HCC)    _______________________________________________    Chief Complaint: CHF exacerbation    History of Present Illness:  Patient is a 48-year-old male with past medical history of GERD, hypertension, severe rheumatoid arthritis previously on Humira, chronic pain history of BKA (completed inpatient rehab course3/21/2023 - 4/12/2023, depression and CHF who presented to [facility] on 8/12 with worsening shortness of breath.  Per documentation, patient had a recent hospitalization in July 2023 with cellulitis of the right lower extremity.  During that hospitalization an echo was completed that showed severe aortic regurgitation, moderate aortic stenosis and mild to moderate mitral regurgitation as well as some calcified mitral mass measuring 3 cm.  Patient returned to the emergency department with shortness of breath.  At that time work-up showed pulmonary edema, labs notable for hyponatremia and elevated creatinine.  Patient required admission to the ICU, and patient was diuresed with IV Lasix.  Cardiology was consulted who recommended continued diuresis.  Patient's hospital course was notable for worsening liver function, right upper quadrant ultrasound was obtained and GI was consulted.  Right upper quadrant ultrasound was negative for acute findings and hepatitis panel was negative.  Patient's AST and ALT continued to improve.  Patient required both IV fluid hydration as well as diuresis for cardiorenal syndrome.  Patient's kidney function has improved, creatinine 1.028/19.  Patient has been newly started on torsemide.  Patient continues to require Humira injections every 2 weeks.  Of  note he is also a chronic pain patient, patient is chronically on high-dose morphine at morphine 30 mg 4 times a day and MS Contin 15 mg twice a day.    Patient seen and examined at bedside.  Patient reports feeling tired and fatigued.  Otherwise denies lightheadedness or shortness of breath.  Patient denies chest pain or coughing.  Patient has not had any episodes of lightheadedness or dizziness when attempting to get up with therapies.  Patient reports that the edema in his residual limb has fluctuated since being on diuresis.  Patient's wife is bringing his prosthesis, discussed with patient plan to adjust sock ply as needed.  Patient is asking about his home dose morphine.  Assured patient is chronic pain meds have not been changed.  Also reviewed with patient that we will not be increasing his chronic pain medications.  Patient denies being in pain today.  Reviewed with patient tomorrow he will be on a new medication called torsemide, reviewed with patient to monitor for hypotension or lightheadedness.  Patient denies any increased rheumatoid arthritis flares.  Reports his last Humira injection was last Wednesday, 8/16.    Review of Systems:     Comprehensive 14 point ROS was reviewed and all were negative except as noted elsewhere in this document.     Past Medical History:  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    Hemorrhagic  disorder ([facility])     hx of bleeding ulcers    History of bleeding ulcers 2/12/2015    Hypertension 05/03/2021    pt states well controlled on meds    Insomnia 5/21/2009    Migraine 5/21/2009    Morning headache     Obesity, morbid ([facility]) 5/21/2009    Osteomyelitis ([facility]) 10/7/2019    Pain 09/2019    Chronic knee and back pain    Pubic ramus fracture ([facility]) 9/28/2019    Rheumatoid arteritis ([facility]) 09/2019    knee, feet right hand    Sleep apnea 5/21/2009    Did not tolerate cpap, does not use it    Snoring        Past Surgical History:  Past Surgical History:   Procedure Laterality Date    KNEE AMPUTATION BELOW Left 3/9/2023    Procedure: AMPUTATION, BELOW KNEE;  Surgeon: Wayne Chambers M.D.;  Location: SURGERY [facility];  Service: Orthopedics    PB TOTAL KNEE ARTHROPLASTY Right 7/22/2020    Procedure: ARTHROPLASTY, KNEE, TOTAL;  Surgeon: Temo Pires M.D.;  Location: [facility];  Service: Orthopedics    TENDON TRANSFER Right 1/22/2020    Procedure: RIGHT DISTAL ULNA RESECTION AND EXTENSOR TENDON TRANSFER;  Surgeon: Marine Rushing M.D.;  Location: [facility];  Service: Orthopedics    OTHER ORTHOPEDIC SURGERY Right 2020    total right knee replacement    IRRIGATION & DEBRIDEMENT ORTHO Left 10/9/2019    Procedure: IRRIGATION AND DEBRIDEMENT, WOUND - PELVIC OSTEOMYELITIS;  Surgeon: You Olivares M.D.;  Location: [facility];  Service: Orthopedics    OTHER SURGICAL PROCEDURE  2019    osteomelitis of pelvis cleaned    GASTRIC BYPASS LAPAROSCOPIC  2000    Lucila en y    CHOLECYSTECTOMY  2000    OTHER SURGICAL PROCEDURE      repair of broken penis       Family History:  Family History   Problem Relation Age of Onset    Hypertension Mother     Arthritis Mother     Depression Mother     Cancer Father         bladder    Schizophrenia Father     Cancer Maternal Grandmother         breast    Heart Disease Maternal Grandmother     Psychiatric Illness Other     Stroke Maternal Aunt      Other Neg Hx         no connective tissue disorders or known aortic disease       Medications:  Current Facility-Administered Medications   Medication Dose    Respiratory Therapy Consult      Pharmacy Consult Request ...Pain Management Review 1 Each  1 Each    senna-docusate (Pericolace Or Senokot S) 8.6-50 MG per tablet 2 Tablet  2 Tablet    And    polyethylene glycol/lytes (Miralax) PACKET 1 Packet  1 Packet    And    magnesium hydroxide (Milk Of Magnesia) suspension 30 mL  30 mL    And    bisacodyl (Dulcolax) suppository 10 mg  10 mg    omeprazole (PriLOSEC) capsule 20 mg  20 mg    mag hydrox-al hydrox-simeth (Maalox Plus Es Or Mylanta Ds) suspension 20 mL  20 mL    sodium chloride (Ocean) 0.65 % nasal spray 2 Spray  2 Spray    heparin injection 5,000 Units  5,000 Units    [START ON 8/21/2023] dapagliflozin propanediol (Farxiga) tablet 10 mg  10 mg    [START ON 8/21/2023] DULoxetine (Cymbalta) capsule 60 mg  60 mg    morphine (MS IR) tablet 30 mg  30 mg    morphine ER (Ms Contin) tablet 15 mg  15 mg    [START ON 8/21/2023] torsemide (Demadex) tablet 40 mg  40 mg    [START ON 8/21/2023] potassium chloride SA (Kdur) tablet 10 mEq  10 mEq    acetaminophen (Tylenol) tablet 500 mg  500 mg    melatonin tablet 3 mg  3 mg       Allergies:  Nsaids, Diphenhydramine, Mirtazapine, Penicillins, and Promethazine    Psychosocial History:  Patient lives in a one-story house with ramp access, lives with spouse who is supportive and can assist    Level of Function Prior to Disability:  Patient was previously mod I with prosthesis and wheelchair level, status post left BKA    Level of Function Prior to Admission to [facility]:  8/17 OT: Mod assist lower body dressing, mod assist toileting, contact-guard assist sit to stand  8/17 PT: Contact-guard assist sit to stand, refused transfers, unable to participate in functional gait, requires prosthesis for sit to stand.      CURRENT LEVEL OF FUNCTION:   Same as  "level of function prior to admission to [facility]    Physical Examination:   Physical Exam:  Vitals: BP 94/60   Pulse 83   Temp 36.7 °C (98.1 °F) (Oral)   Resp 16   Ht 1.803 m (5' 11\")   Wt 79.5 kg (175 lb 4.3 oz)   SpO2 96%   Gen: NAD, laying comfortably in bed  Head:  NC/AT  Eyes/ Nose/ Mouth: PERRLA, moist mucous membranes  Cardio: RRR, good distal perfusion, warm extremities  Pulm: normal respiratory effort, no cyanosis, on room air  Abd: Soft NTND, negative borborygmi   Ext: Mild right ankle edema.. No calf tenderness. No clubbing.    Mental status:  A&Ox4 (person, place, date, situation) answers questions appropriately follows commands  Speech: fluent, no aphasia or dysarthria    CRANIAL NERVES:  2,3: visual acuity grossly intact, PERRL  3,4,6: EOMI bilaterally, no nystagmus or diplopia  5: sensation intact to light touch bilaterally and symmetric  7: no facial asymmetry  8: hearing grossly intact      Motor:      Upper Extremity  Myotome R L   Shoulder flexion C5 5/5 5/5   Elbow flexion C5 5/5 5/5   Wrist extension C6 5/5 5/5   Elbow extension C7 5/5 5/5   Finger flexion C8 5/5 5/5   Finger abduction T1 5/5 5/5     Lower Extremity Myotome R L   Hip flexion L2 4/5 5/5   Knee extension L3 4/5 5/5   Ankle dorsiflexion L4 4/5 NA   Toe extension L5 4/5 NA   Ankle plantarflexion S1 4/5 NA       Sensory:   intact to light touch through out    DTRs: 2+ in bilateral  biceps  Negative Vaz b/l     Tone: no spasticity noted, no cogwheeling noted    Coordination:   intact finger to nose bilaterally  intact fine motor with fingers bilaterally      Radiology:  8/12 echocardiogram  EF 55%, moderate mitral regurgitation, moderate aortic valve stenosis, moderate aortic insufficiency.    8/12 chest x-ray  IMPRESSION:     1.  Diffuse mixed interstitial and airspace opacities with enlarged cardiac silhouette could be related to edema, however multifocal pneumonia is not excluded.    Laboratory " Values:  Recent Labs     08/19/23 0007   SODIUM 133*   POTASSIUM 4.6   CHLORIDE 91*   CO2 33   GLUCOSE 83   BUN 24*   CREATININE 1.02   CALCIUM 8.5     Recent Labs     08/19/23 0007   WBC 9.2   RBC 4.11*   HEMOGLOBIN 11.5*   HEMATOCRIT 36.2*   MCV 88.1   MCH 28.0   MCHC 31.8*   RDW 49.7   PLATELETCT 234   MPV 9.9           Primary Rehabilitation Diagnosis:    This patient is a 48 y.o. male admitted for acute inpatient rehabilitation with   Acute diastolic heart failure (HCC).    Impairments:   ADLs/IADLs  Mobility    Secondary Diagnosis/Medical Co-morbidities Affecting Function  CHF  History of left BKA  Chronic pain syndrome  Rheumatoid arthritis on Humira  Immune compromise  Transaminitis      Relevant Changes Since Preadmission Evaluation:    Status unchanged    The patient's rehabilitation potential is Excellent  The patient's medical prognosis is excellent    Rehabilitation Plan:   Discussion and Recommendations:   1. The patient requires an acute inpatient rehabilitation program with a coordinated program of care at an intensity and frequency not available at a lower level of care. This recommendation is substantiated by the patient's medical physicians who recommend that the patient's intervention and assessment of medical issues needs to be done at an acute level of care for patient's safety and maximum outcome.   2. A coordinated program of care will be supplied by an interdisciplinary team of physical therapy, occupational therapy, rehab physician, rehab nursing, and, if needed, speech therapy and rehab psychology. Rehab team presents a patient-specific rehabilitation and education program concentrating on prevention of future problems related to accessibility, mobility, skin, bowel, bladder, sexuality, and psychosocial and medical/surgical problems.   3. Need for Rehabilitation Physician: The rehab physician will be evaluating the patient on a multi-weekly basis to help coordinate the program of care.  The rehab physician communicates between medical physicians, therapists, and nurses to maximize the patient's potential outcome. Specific areas in which the rehab physician will be providing daily assessment include the following:   A. Assessing the patient's heart rate and blood pressure response (vitals monitoring) to activity and making adjustments in medications or conservative measures as needed.   B. The rehab physician will be assessing the frequency at which the program can be increased to allow the patient to reach optimal functional outcome.   C. The rehab physician will also provide assessments in daily skin care, especially in light of patient's impairments in mobility.   D. The rehab physician will provide special expertise in understanding how to work with functional impairment and recommend appropriate interventions, compensatory techniques, and education that will facilitate the patient's outcome.   4. Rehab R.N.   The rehab RN will be working with patient to carry over in room mobility and activities of daily living when the patient is not in 3 hours of skilled therapy. Rehab nursing will be working in conjunction with rehab physician to address all the medical issues above and continue to assess laboratory work and discuss abnormalities with the treating physicians, assess vitals, and response to activity, and discuss and report abnormalities with the rehab physician. Rehab RN will also continue daily skin care, supervise bladder/bowel program, instruct in medication administration, and ensure patient safety.   5. Rehab Therapy: Therapies to treat at intensity and frequency of (may change after completion of evaluation by all therapeutic disciplines):       PT:  Physical therapy to address mobility, transfer, gait training and evaluation for adaptive equipment needs 1.5 hour/day at least 5 days/week for the duration of the ELOS (see below)       OT:  Occupational therapy to address ADLs, self-care,  home management training, functional mobility/transfers and assistive device evaluation, and community re-integration 1.5 hour/day at least 5 days/week for the duration of the ELOS (see below).        ST/Dysphagia:  Speech therapy to address speech, language, and cognitive deficits as well as swallowing difficulties with retraining/dysphagia management and community re-integration with comprehension, expression, cognitive training 1.5 hour/day at least 5 days/week for the duration of the ELOS (see below).     Medical management / Rehabilitation Issues/ Adverse Potential as part of rehabilitation plan     Rehabilitation Issues/Adverse Potential  1.  CHF exacerbation:  patient demonstrates functional deficits in strength, balance, coordination, and ADL's. Patient is admitted to [facility] for comprehensive rehabilitation therapy as described below.   Rehabilitation nursing monitors bowel and bladder control, educates on medication administration, co-morbidities and monitors patient safety.    2.  Neurostimulants: None at this time but continue to assess daily for need to initiate should status change.    3.  DVT prophylaxis:  Patient is on heparin for anticoagulation upon transfer. Encourage OOB. Monitor daily for signs and symptoms of DVT including but not limited to swelling and pain to prevent the development of DVT that may interfere with therapies.    4.  GI prophylaxis:  On prilosec to prevent gastritis/dyspepsia which may interfere with therapies.    5.  Pain: No issues with pain currently / Controlled with chronic pain medications, MS Contin 15 mg twice daily and morphine 30 mg 4 times a day, do not anticipate patient will need additional narcotic medications for pain    6.  Nutrition/Dysphagia: Dietician monitors nutrient intake, recommend supplements prn and provide nutrition education to pt/family to promote optimal nutrition for wound healing/recovery.     7.  Bladder/bowel:  Start  bowel and bladder program as described below, to prevent constipation, urinary retention (which may lead to UTI), and urinary incontinence (which will impact upon pt's functional independence).   - TV Q3h while awake with post void bladder scans, I&O cath for PVRs >400  - up to commode after meal     8.  Skin/dermal ulcer prophylaxis: Monitor for new skin conditions with q.2 h. turns as required to prevent the development of skin breakdown.     9.  Cognition/Behavior: As needed psychologist provides adjustment counseling to illness and psychosocial barriers that may be potential barriers to rehabilitation.     10. Respiratory therapy: RT performs O2 management prn, breathing retraining, pulmonary hygiene and bronchospasm management prn to optimize participation in therapies.     Medical Co-Morbidities/Adverse Potential Affecting Function:  CHF exacerbation  - Originally presented with shortness of breath and pulmonary edema as well as cardiorenal syndrome  -While at [facility], patient was on IV Lasix for diuresis  - Cardiology was consulted at [facility] for patient's known aortic and mitral regurgitation, EF 55%  - Patient has been transferred on torsemide 40 mg daily, discussed new medication with [facility] hospitalist Dr. Wynne.  - Patient was not previously on torsemide, this will be a new medication that starts on 8/21  - Continue farxiga   -PT and OT for mobility and ADLs. Per guidelines, 15 hours per week between PT, OT and/or SLP.  -Follow-up with cardiology    Hypokalemia  - Transferred on potassium 10 mg daily  - Follow-up admission labs in the morning    Anxiety depression  - Continue home dose Cymbalta    Insomnia  - Avoiding trazodone, patient had prolonged QTc from prior EKG  - New EKG ordered to evaluate QTc  - Replace standing as needed trazodone for melatonin    Rheumatoid arthritis  - Is on Humira q. 2 weeks  - Last dose 8/16  -Has previously been on steroids, has not been transferred to  renown rehab on steroids, may consider restarting if patient has an RA flare    History of left BKA  - Previously at rehab in March/April 2023  - Monitor for edema control and residual limb for proper fit with prosthesis, wife will be bringing left BKA prosthesis    Chronic pain  - Chronically followed by pain management  - Is on home dose morphine 30 mg 4 times a day as well as MS Contin 15 mg twice daily  - Do not anticipate patient will need increased as needed pain medications  - Naloxone as needed narcotics overdose    Skin - Patient at risk for skin breakdown due to debility in areas including sacrum, achilles, elbows and head in addition to other sites. Nursing to assess skin daily.     GI Ppx - Patient on Prilosec for GERD prophylaxis. Patient on Senna-docusate for constipation prophylaxis.   Last BM: 08/12/23    DVT Ppx - heparin     I personally performed a complete drug regimen review and no potential clinically significant medication issues were identified.     Goals/Expected Level of Function Based on Current Medical and Functional Status:  (may change based on patient's medical status and rate of impairment recovery)  Transfers:   Modified Independent  Mobility/Gait:   Modified Independent  ADL's:   Modified Independent    DISPOSITION: Discharge to pre-morbid independent living setting with the supportive care of patient's spouse.    ELOS: 10 to 14 days  ____________________________________    Angelique Cody D.O.    ____________________________________    Pt was seen today for 79 min, and entire time spent in face-to-face contact was >50% in counseling and coordination of care as detailed in A/P above.

## 2023-08-20 NOTE — PROGRESS NOTES
[facility] Report given to Tamika CUEVAS. Tele box removed and dc'd IV's, piedra catheter left ib place as per Dr Montes. Pt states that all personal belongings are in his possession. Pt off unit via wc. No s/s of acute distress.

## 2023-08-20 NOTE — PROGRESS NOTES
Telemetry Shift Summary    Rhythm SR, first degree HB  HR Range 68-73  Ectopy rPVC, rCoup  Measurements 0.22/0.08/0.42        Normal Values  Rhythm SR  HR Range    Measurements 0.12-0.20 / 0.06-0.10  / 0.30-0.52

## 2023-08-20 NOTE — PROGRESS NOTES
4 Eyes Skin Assessment Completed by FAITH Brooks and FAITH Lundberg.    Head WDL  Ears WDL  Nose WDL  Mouth WDL  Neck WDL  Breast/Chest WDL  Shoulder Blades WDL  Spine WDL  (R) Arm/Elbow/Hand Bruising  (L) Arm/Elbow/Hand Bruising  Abdomen WDL  Groin Redness  Scrotum/Coccyx/Buttocks Redness and Blanching  (R) Leg dry, flaky  (L) Leg BKA  (R) Heel/Foot/Toe small plantar wound, black in nature  (L) Heel/Foot/Toe BKA, healed          Devices In Places Rivera      Interventions In Place Sacral Mepilex    Possible Skin Injury No    Pictures Uploaded Into Epic Yes  Wound Consult Placed N/A  RN Wound Prevention Protocol Ordered Yes

## 2023-08-20 NOTE — PROGRESS NOTES
1852 received bedside report and assumed patient care. Patient watching TV with unlabored breathing.     1953 patient assessment completed. Patient confirms pain and discussed blood pressures. Medications, diet, and plan of care for the evening discussed. All patient's needs and questions addressed at this time.

## 2023-08-20 NOTE — CARE PLAN
The patient is Stable - Low risk of patient condition declining or worsening    Shift Goals  Clinical Goals: safety  Patient Goals: safety     Problem: Fall Risk - Rehab  Goal: Patient will remain free from falls  Outcome: Not Met patient is AOx4 oriented to unit and unit routine. Patient oriented to safety precautions that are in place and how to call for assistance when needed. Patient has call light close by, alarms set on bed and w/c, bed in low position, will continue to educate regarding safety.      Please fax at your convenience thanks

## 2023-08-20 NOTE — CARE PLAN
The patient is Stable - Low risk of patient condition declining or worsening    Shift Goals  Clinical Goals: pain management, 1500mL fluid restriction  Patient Goals: pain control, figure out discharge plan  Family Goals: JI    Progress made toward(s) clinical / shift goals:    Problem: Knowledge Deficit - Standard  Goal: Patient and family/care givers will demonstrate understanding of plan of care, disease process/condition, diagnostic tests and medications  Outcome: Progressing     Problem: Fall Risk  Goal: Patient will remain free from falls  Outcome: Progressing     Problem: Skin Integrity  Goal: Skin integrity is maintained or improved  Outcome: Progressing     Problem: Pain - Standard  Goal: Alleviation of pain or a reduction in pain to the patient’s comfort goal  Outcome: Progressing     Problem: Hemodynamics  Goal: Patient's hemodynamics, fluid balance and neurologic status will be stable or improve  Outcome: Progressing       Patient is not progressing towards the following goals:

## 2023-08-20 NOTE — DISCHARGE PLANNING
[facility] TCN chart review completed. Collaborated with LINSEY Sanders. Per prior TCN note IRF choice obtained  however it was not in media.  Current discharge considerations are for IRF.   Patient seen at bedside and IRF choice obtained, faxed to [facility] and given to CM.  Patient in agreement with going to [facility]  today.  Per collaboration with CM, plan for discharge to IRF at 1330/1400 today.  TCN will continue to follow and collaborate with discharge planning team as additional post acute needs arise. Thank you.    Completed:  PT/OT recommend post acute placement - 8/17  Choice obtained: IRF on 8/20/23.  HH, DME(O2), SNF  [facility] (1)  [facility] (2)  [facility] (3)   [facility] referral not sent, likely post acute placement.

## 2023-08-20 NOTE — PROGRESS NOTES
"Hospital Medicine Daily Progress Note    Date of Service  8/20/2023    Chief Complaint  Richard Hubbard II is a 48 y.o. male admitted 8/12/2023 with SOB    Hospital Course  Per notes, \"48 y.o. 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  On ant leaflet on the Mitral is calcified mass 3 cm.     CXR c/w pulm edema   , creatinine 2.66, lfts elevated, pro BNP 92193  EKG pt now has LBBB which is new     I discussed the plan of care with   D/w DR. Ivey from cardiology the ECHO findings of 3 weeks ago and pt status, recommends no need to transfer to [facility] at this time   Optimize cardiac status with diuresis and if worsens to transfer to [facility] for further evaluation by cardiology     In ER he received 300 cc of fluid, vanc and zosyn     Pt being admitted to [facility] ICU      8/13: ECHO yesterday is different readings than the [place] ECHO  \"Normal left ventricular chamber size.  The left ventricular ejection fraction is visually estimated to be 55%.  Normal right ventricular size and systolic function.  Moderate mitral regurgitation.  Moderate aortic valve stenosis.  Vmax is3.6  m/s.  Moderate aortic insufficiency.  Estimated right ventricular systolic pressure is 50 mmHg.\"     AI is quantified as moderate now  Mitral calcified mass is read as possible atrial mass  Diastolic heart failure Grade I  Do think pt weill need a MIA  Consult rounding cardiologist on Monday  Pt feels better with diuresis.\"    Interval Problem Update  Per notes, \"8/14: Patient seen at bedside this morning.  Overall the patient feels better.  I did consult and discussed case with cardiology with Dr. Clements who " "recommends only to continue with diuresis for now.  If the patient decompensates the patient would potentially require transfer to main for further management and care cardiology.  We have ordered right upper quadrant ultrasound to evaluate for liver function test, however I suspect this is most likely due to cardiac congestion we will continue with diuresis for now.  I have also ordered hepatitis panel.  Patient denying abdominal pain at this time.    --UPDATE: Worsening AST, hepatitis panel pending as well as RUQ-US. We have consulted GI, we appreciate further recommendations.\"    8/16: Patient had remarkable improvement and LFTs overnight.  Overall joint pain has improved as well.  We will continue to monitor this more day if continues to improve likely DC in the next 24 to 48 hours.    8/17: Patient is actually doing quite well, however was reevaluated by PT/OT today who is now recommending skilled nursing, case management working on this.  Patient is cleared for discharge once bed is available.    8/18: Patient is medically clear for discharge, continues to progress well.  Pain improving.  Pending either skilled nursing or inpatient rehab.  No need for daily labs as they have been stable.    8/19: Patient medically cleared, initially was scheduled to discharge on 8/19, however transfer was not arranged till 8/20.  Will discharge to inpatient rehab.      I have discussed this patient's plan of care and discharge plan at IDT rounds today with Case Management, Nursing, Nursing leadership, and other members of the IDT team.    Consultants/Specialty  cardiology - Dr Clements    Code Status  Full Code    Disposition  The patient is not medically cleared for discharge to home or a post-acute facility.  Anticipate discharge to: an inpatient rehabilitation hospital      Review of Systems  Review of Systems   Constitutional:  Positive for malaise/fatigue. Negative for chills and fever.   HENT:  Negative for hearing loss " and nosebleeds.    Eyes:  Negative for blurred vision and double vision.   Respiratory:  Positive for shortness of breath.    Cardiovascular:  Positive for leg swelling. Negative for chest pain and palpitations.   Gastrointestinal:  Negative for abdominal pain, heartburn and vomiting.   Genitourinary:  Negative for dysuria and urgency.   Musculoskeletal:  Negative for back pain and falls.   Skin:  Negative for itching and rash.   Neurological:  Negative for dizziness and headaches.   Psychiatric/Behavioral:  Negative for substance abuse. The patient is not nervous/anxious.    All other systems reviewed and are negative.       Physical Exam  Temp:  [36.3 °C (97.4 °F)-36.6 °C (97.9 °F)] 36.3 °C (97.4 °F)  Pulse:  [71-75] 71  Resp:  [16-20] 18  BP: ()/(41-52) 96/47  SpO2:  [93 %-95 %] 94 %    Physical Exam  Vitals and nursing note reviewed.   Constitutional:       Appearance: He is obese. He is ill-appearing.   HENT:      Head: Normocephalic and atraumatic.   Cardiovascular:      Rate and Rhythm: Normal rate and regular rhythm.      Heart sounds: Murmur heard.   Pulmonary:      Breath sounds: Rales present.      Comments: On supplemental oxygen  Abdominal:      General: Abdomen is flat. Bowel sounds are normal. There is no distension.      Palpations: Abdomen is soft.      Tenderness: There is no abdominal tenderness.   Musculoskeletal:      Cervical back: Normal range of motion and neck supple.      Right lower leg: Edema present.      Comments: Left BKA   Skin:     General: Skin is warm.   Neurological:      General: No focal deficit present.      Mental Status: He is alert and oriented to person, place, and time.         Fluids    Intake/Output Summary (Last 24 hours) at 8/20/2023 1248  Last data filed at 8/20/2023 0600  Gross per 24 hour   Intake 902 ml   Output 2700 ml   Net -1798 ml         Laboratory  Recent Labs     08/19/23  0007   WBC 9.2   RBC 4.11*   HEMOGLOBIN 11.5*   HEMATOCRIT 36.2*   MCV 88.1  "  MCH 28.0   MCHC 31.8*   RDW 49.7   PLATELETCT 234   MPV 9.9       Recent Labs     08/19/23  0007   SODIUM 133*   POTASSIUM 4.6   CHLORIDE 91*   CO2 33   GLUCOSE 83   BUN 24*   CREATININE 1.02   CALCIUM 8.5                       Imaging  US-LIVER AND VESSELS COMPLETE (COMBO)   Final Result      Grossly unremarkable liver doppler but evaluation is very limited due to body habitus and poor breath-hold.      EC-ECHOCARDIOGRAM COMPLETE W/O CONT   Final Result      DX-CHEST-PORTABLE (1 VIEW)   Final Result      1.  Diffuse mixed interstitial and airspace opacities with enlarged cardiac silhouette could be related to edema, however multifocal pneumonia is not excluded.             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 Continue with diuresis  Denying abdominal pain  Hepatitis panel negative for acute findings.  US-RUQ negative for acute findings  Improving, will need outpatient follow-up    Cardiorenal syndrome- (present on admission)  Assessment & Plan  Continue diuresis  Creatinine continues to improve  If worsening we will consider consulting nephrology    Hyponatremia- (present on " admission)  Assessment & Plan  In the setting of volume overload  Monitor  improving    Generalized weakness  Assessment & Plan  PT/OT eval and treat-recommending postacute care    Chronic pain syndrome- (present on admission)  Assessment & Plan  Continue home pain medications    Rheumatoid arthritis (HCC)- (present on admission)  Assessment & Plan  Hx of  Patient on Humira  Both ESR and CRP elevated .  Prednisone 40 mg daily  Pain management with home morphine  Improving         VTE prophylaxis: heparin    I have performed a physical exam and reviewed and updated ROS and Plan today (8/20/2023). In review of yesterday's note (8/19/2023), there are no changes except as documented above.

## 2023-08-20 NOTE — CARE PLAN
The patient is Stable - Low risk of patient condition declining or worsening    Shift Goals  Clinical Goals: safety  Patient Goals: safety      Problem: Pain - Standard  Goal: Alleviation of pain or a reduction in pain to the patient’s comfort goal  Outcome: Not Met Patient able to verbalize pain level and verbalize an acceptable level of pain.

## 2023-08-20 NOTE — PROGRESS NOTES
NURSING DAILY NOTE    Name: Richard Hubbard II   Date of Admission: 8/20/2023   Admitting Diagnosis: Acute diastolic heart failure (HCC)  Attending Physician: Keaton Pisano M.d.  Allergies: Nsaids, Diphenhydramine, Mirtazapine, Penicillins, and Promethazine    Safety  Patient Assist  Mod assist  Patient Precautions     Precaution Comments     Bed Transfer Status     Toilet Transfer Status      Assistive Devices  Wheelchair  Oxygen  Room air w/o2 available  Diet/Therapeutic Dining  Current Diet Order   Procedures    Diet Order Diet: Level 7 - Easy to Chew (advance as tolerated); Liquid level: Level 0 - Thin; Second Modifier: (optional): Renal; Fluid modifications: (optional): 1500 ml Fluid Restriction     Pill Administration  whole  Agitated Behavioral Scale     ABS Level of Severity       Fall Risk  Has the patient had a fall this admission?   No  Osiris Healy Fall Risk Scoring  15, HIGH RISK  Fall Risk Safety Measures  bed alarm and chair alarm    Vitals  Temperature: 36.7 °C (98.1 °F)  Temp src: Oral  Pulse: 76  Respiration: 16  Blood Pressure: 94/60  Blood Pressure MAP (Calculated): 71 MM HG  BP Location: Left, Upper Arm  Patient BP Position: Supine     Oxygen  Pulse Oximetry: 93 %  O2 (LPM): 0  FiO2%: 21 %  O2 Delivery Device: Room air w/o2 available    Bowel and Bladder  Last Bowel Movement  08/12/23  Stool Type     Bowel Device     Continent  Bladder: Continent void (Rivera catheter)   Bowel: Continent movement  Bladder Function  Urine Color: Yellow  Genitourinary Assessment   Bladder Assessment (WDL):  WDL Except  Rivera Catheter: Present with Active Order  Rivera Reasons per MD Order: Acute urinary retention or bladder outlet obstruction  Urinary Elimination: Catheter (Document on LDA)  Urine Color: Yellow  Bladder Device: Indwelling Catheter    Skin  Nam Score   18  Sensory Interventions      Moisture  Interventions  Moisturizers/Barriers: Barrier Paste      Pain  Pain Rating Scale  3 - Sometimes distracts me  Pain Location  Generalized  Pain Location Orientation     Pain Interventions   Environmental Changes, Emotional Support, Education    ADLs    Bathing      Linen Change      Personal Hygiene     Chlorhexidine Bath      Oral Care     Teeth/Dentures  Broken Teeth (Comments)  Shave     Nutrition Percentage Eaten     Environmental Precautions     Patient Turns/Positioning  Patient Turns Self from Side to Side  Patient Turns Assistance/Tolerance     Bed Positions  Bed Controls On, Bed Locked  Head of Bed Elevated  Self regulated      Psychosocial/Neurologic Assessment  Psychosocial Assessment  Psychosocial (WDL):  WDL Except  Patient Behaviors: Fatigue  Neurologic Assessment  Neuro (WDL): Within Defined Limits  Level of Consciousness: Alert  Orientation Level: Oriented X4  Cognition: Follows commands  Speech: Clear  Pupil Assesment: Yes  R Pupil Size (mm): 2  R Pupil Shape / Description: Round  R Pupil Reaction: Brisk  L Pupil Size (mm): 2  L Pupil Shape / Description: Round  L Pupil Reaction: Brisk  EENT (WDL):  WDL Except    Cardio/Pulmonary Assessment  Edema      Respiratory Breath Sounds  RUL Breath Sounds: Clear  RML Breath Sounds: Clear, Diminished  RLL Breath Sounds: Diminished  DONALD Breath Sounds: Clear  LLL Breath Sounds: Diminished  Cardiac Assessment   Cardiac (WDL):  WDL Except

## 2023-08-20 NOTE — DISCHARGE PLANNING
Discussed pt during IDT rounds. M.GINA. signed COBRA form. LSW contacted pt's spouse to complete assessment and verify that she knew pt's d/c to [facility] today at 1330/1400; confirmed.    Care Transition Team Assessment    LMSW contacted pt's spouse and was able to verify the information on the face sheet.  Pt lives with his spouse. Prior to this hospitalization pt was independent at home with ADLs and IADLs. Pt does not use any DME at baseline. Pt's spouse and mother are good support. Per pt's spouse, pt has had hx of alcohol and prescription drug misuse; pt is currently in treatment and has not used in 10 years. Pt has hx of bipolar and depression. Pt's spouse is going to bring clothes for pt at rehab when he is admitted.    Information Source  Orientation Level: Oriented X4  Information Given By: Spouse  Informant's Name: Katiana Hubbard (wife)  Who is responsible for making decisions for patient? : Patient    Readmission Evaluation  Is this a readmission?: No    Elopement Risk  Legal Hold: No  Ambulatory or Self Mobile in Wheelchair: No-Not an Elopement Risk  Elopement Risk: Not at Risk for Elopement    Interdisciplinary Discharge Planning  Primary Care Physician: Johnathan Dover  Lives with - Patient's Self Care Capacity: Spouse, Child Less than 18 Years of Age  Patient or legal guardian wants to designate a caregiver: No  Support Systems: Family Member(s), Friends / Neighbors, [facility]  Housing / Facility: 1 Story House  Durable Medical Equipment: Not Applicable    Discharge Preparedness  What is your plan after discharge?: Skilled nursing facility  Prior Functional Level: Ambulatory, Independent with Activities of Daily Living, Independent with Medication Management, Drives Self    Functional Assesment  Prior Functional Level: Ambulatory, Independent with Activities of Daily Living, Independent with Medication Management, Drives Self    Finances  Financial Barriers to Discharge: No    Vision /  Hearing Impairment  Vision Impairment : Yes  Right Eye Vision: Impaired, Wears Glasses  Left Eye Vision: Impaired, Wears Glasses  Hearing Impairment : No    Domestic Abuse  Have you ever been the victim of abuse or violence?: No  Physical Abuse or Sexual Abuse: No  Verbal Abuse or Emotional Abuse: No  Possible Abuse/Neglect Reported to:: Not Applicable    Psychological Assessment  History of Substance Abuse: Alcohol  Date Last Used - Alcohol: 2013  Substance Abuse Comments: Per wife, pt has been in treatment since last use 10 years ago  History of Psychiatric Problems: Yes  Non-compliant with Treatment: No  Newly Diagnosed Illness: Yes    Discharge Risks or Barriers  Discharge risks or barriers?: No  Patient risk factors: Other (comment) (None)    Anticipated Discharge Information  Discharge Disposition: Disch to IP rehab facility or distinct part unit (62)

## 2023-08-20 NOTE — PROGRESS NOTES
Patient admitted to facility at 14:05 via wheel chair,  accompanied by [facility] staff.  Patient assisted to room and positioned in bed for comfort and safety; call light within reach.  Patient assisted with stowing belongings and oriented to room and facility. Admission assessment performed and documented in computer.  Admission paperwork completed.  Face photo and skin photos documented in media. Appropriate LDAs opened. Pt with Nam score of 18.

## 2023-08-20 NOTE — FLOWSHEET NOTE
08/20/23 1506   Protocol Assessment   Initial Assessment Yes   Patient History   Pulmonary Diagnosis was diagnosed 3 or 4 years ago with [facility]   Home O2 No   Nocturnal CPAP No   Home Treatments/Frequency No   Sleep Apnea Screening   Have you had a sleep study? Yes  (needs another sleep study)   Have you been diagnosed with sleep apnea? Yes   Do you use a CPAP/BIPAP/AUTOPAP? No   COPD Risk Screening   Do you have a history of COPD? No   COPD Population Screener   During the past 4 weeks, how much did you feel short of breath? 1   Do you ever cough up any mucus or phlegm? 1   In the past 12 months, you do less than you used to because of your breathing problems 1   Have you smoked at least 100 cigarettes in your entire life? 2   How old are you? 0   COPD Screening Score 5   COPD Coordinator Recommended Yes   Protocol Pathways   Protocol Pathways None        08/20/23 1506   Protocol Assessment   Initial Assessment Yes   Patient History   Pulmonary Diagnosis was diagnosed 3 or 4 years ago with [facility]   Home O2 No   Nocturnal CPAP No   Home Treatments/Frequency No   Sleep Apnea Screening   Have you had a sleep study? Yes  (needs another sleep study)   Have you been diagnosed with sleep apnea? Yes   Do you use a CPAP/BIPAP/AUTOPAP? No   COPD Risk Screening   Do you have a history of COPD? No   COPD Population Screener   During the past 4 weeks, how much did you feel short of breath? 1   Do you ever cough up any mucus or phlegm? 1   In the past 12 months, you do less than you used to because of your breathing problems 1   Have you smoked at least 100 cigarettes in your entire life? 2   How old are you? 0   COPD Screening Score 5   COPD Coordinator Recommended Yes   Protocol Pathways   Protocol Pathways None

## 2023-08-20 NOTE — PROGRESS NOTES
Bedside report received from Karli ROSARIO RN and assumed Pt's cares. Call light within reach. Safety measures in place.

## 2023-08-20 NOTE — CARE PLAN
The patient is Stable - Low risk of patient condition declining or worsening    Shift Goals  Clinical Goals: mange pain, 1500 ml fluid restriction  Patient Goals: control pain  Family Goals: JI    Progress made toward(s) clinical / shift goals:    Problem: Knowledge Deficit - Standard  Goal: Patient and family/care givers will demonstrate understanding of plan of care, disease process/condition, diagnostic tests and medications  Outcome: Progressing     Problem: Fall Risk  Goal: Patient will remain free from falls  Outcome: Progressing     Problem: Pain - Standard  Goal: Alleviation of pain or a reduction in pain to the patient’s comfort goal  Outcome: Progressing       Patient is not progressing towards the following goals:

## 2023-08-20 NOTE — FLOWSHEET NOTE
08/20/23 1506   Events/Summary/Plan   Events/Summary/Plan new admit no hx, was diagnosed with lc years ago does not use cpap at noc   Vital Signs   Pulse 76   Respiration 16   Pulse Oximetry 93 %   $ Pulse Oximetry (Spot Check) Yes   Respiratory Assessment   Respiratory Pattern Within Normal Limits   Level of Consciousness Alert   Chest Exam   Work Of Breathing / Effort Within Normal Limits   Breath Sounds   RUL Breath Sounds Clear   RML Breath Sounds Clear;Diminished   RLL Breath Sounds Diminished   DONALD Breath Sounds Clear   LLL Breath Sounds Diminished   Oxygen   O2 (LPM) 0   FiO2% 21 %   O2 Delivery Device Room air w/o2 available   Smoking History   Have you ever smoked Yes   Have you smoked in the last 12 months Yes   Have you quit smoking No   Do you have any pipes/cigarettes/lighter/matches/etc in your possesion No   Smoking Cessation Offered Patient Counseled

## 2023-08-21 PROBLEM — E87.79 CARDIAC VOLUME OVERLOAD: Status: ACTIVE | Noted: 2023-01-01

## 2023-08-21 PROBLEM — Z98.84 HISTORY OF GASTRIC BYPASS: Status: ACTIVE | Noted: 2023-01-01

## 2023-08-21 NOTE — CARE PLAN
The patient is Stable - Low risk of patient condition declining or worsening    Shift Goals  Clinical Goals: safety  Patient Goals: Sleep well    Progress made toward(s) clinical / shift goals:      Problem: Knowledge Deficit - Standard  Goal: Patient and family/care givers will demonstrate understanding of plan of care, disease process/condition, diagnostic tests and medications  Outcome: Progressing  Patient was having low BP with lightheadedness this am while up in chair. Dr Pisano notified.      Problem: Fall Risk - Rehab  Goal: Patient will remain free from falls  Outcome: Progressing  Patient use call light for assistance.        Patient is not progressing towards the following goals:

## 2023-08-21 NOTE — THERAPY
"Occupational Therapy   Initial Evaluation     Patient Name: Richard Hubbard II  Age:  48 y.o., Sex:  male  Medical Record #: [id number]  Today's Date: 8/21/2023     Subjective    \"I'm just so tired, this is the first I've really been out of the bed in 10 days.\"      Objective       08/21/23 0701   OT Charge Group   Charges Yes   OT Self Care / ADL (Units) 1   OT Evaluation OT Evaluation Mod   OT Total Time Spent   OT Individual Total Time Spent (Mins) 60   Prior Living Situation   Prior Services Home-Independent   Housing / Facility [address] [facility]   Steps Into Home 0  (ramp)   Steps In Home 0   Bathroom Set up Walk In Shower;Bathtub / Shower Combination;Tub Transfer Bench;Shower Curtain   Equipment Owned Wheelchair;Front-Wheel Walker;Tub Transfer Bench;Bed Side Commode;Ramp   Lives with - Patient's Self Care Capacity Spouse;Child Less than 18 Years of Age  (5 and 8 yo)   Comments wife works full time- pediatric NP at [employer]- has friends who can assist as needed at home if wife is at work   Prior Level of ADL Function   Self Feeding Independent   Grooming / Hygiene Independent   Bathing Independent   Dressing Independent   Toileting Independent   Prior Level of IADL Function   Medication Management Independent   Laundry Independent   Kitchen Mobility Independent   Finances Independent   Home Management Independent   Shopping Independent   Prior Level Of Mobility Independent With Device in Community;Independent With Device in Home   Driving / Transportation Driving Independent   Occupation (Pre-Hospital Vocational) Retired Due To Disability   Leisure Interests Family  (spending time with kids)   Prior Functioning: Everyday Activities   Self Care Independent   Indoor Mobility (Ambulation) Independent   Stairs Dependent   Functional Cognition Independent   Prior Device Use Manual wheelchair   Vitals   Pulse 77   Patient BP Position Supine   Blood Pressure (!) 88/49   Respiration 17   Pulse Oximetry 96 % " "  O2 (LPM) 1.5   O2 Delivery Device Silicone Nasal Cannula   Pain   Intervention Distraction;Emotional Support;Rest;Repositioned   Pain 0 - 10 Group   Location Generalized;Jaw;Leg   Location Orientation Right;Left;Upper   Pain Rating Scale (NPRS) 5   Description Aching;Burning   Comfort Goal Comfort with Movement;Perform Activity;Sleep Comfortably   Therapist Pain Assessment Post Activity Pain Same as Prior to Activity   Non Verbal Descriptors   Non Verbal Scale  Grimacing;Moaning;Tense Body Language   Cognition    Orientation Level Oriented x 4   Level of Consciousness Alert   Ability To Follow Commands 2 Step   ABS (Agitated Behavior Scale)   Agitated Behavior Scale Performed No   Cognitive Pattern Assessment   Cognitive Pattern Assessment Used BIMS   Brief Interview for Mental Status (BIMS)   Repetition of Three Words (First Attempt) 3   Temporal Orientation: Year Correct   Temporal Orientation: Month Accurate within 5 days   Temporal Orientation: Day Correct   Recall: \"Sock\" Yes, no cue required   Recall: \"Blue\" Yes, no cue required   Recall: \"Bed\" Yes, no cue required   BIMS Summary Score 15   Confusion Assessment Method (CAM)   Is there evidence of an acute change in mental status from the patient's baseline? No   Inattention Behavior not present   Disorganized thinking Behavior not present   Altered level of consciousness Behavior not present   Vision Screen   Vision Not tested  (no reports of double/blurry vision; wears glasses)   Passive ROM Upper Body   Passive ROM Upper Body WDL   Active ROM Upper Body   Active ROM Upper Body  WDL   Dominant Hand Right   Strength Upper Body   Upper Body Strength  X   Gross Strength Generalized Weakness, Equal Bilaterally.    Sensation Upper Body   Upper Extremity Sensation  WDL   Comments hysensitivity in L stump   Upper Body Muscle Tone   Upper Body Muscle Tone  WDL   Bed Mobility    Supine to Sit Standby Assist   Sit to Supine Contact Guard Assist   Sit to Stand " Moderate Assist   Scooting Contact Guard Assist   Rolling Standby Assist   Coordination Upper Body   Coordination WDL   Eating   Assistance Needed Independent   CARE Score - Eating 6   Eating Discharge Goal   Discharge Goal 6   Oral Hygiene   Assistance Needed Set-up / clean-up   CARE Score - Oral Hygiene 5   Oral Hygiene Discharge Goal   Discharge Goal 6   Shower/Bathe Self   Reason if not Attempted Medical concerns   CARE Score - Shower/Bathe Self 88   Upper Body Dressing   Assistance Needed Physical assistance   Physical Assistance Level 26%-50%   CARE Score - Upper Body Dressing 3   Upper Body Dressing Discharge Goal   Discharge Goal 6   Lower Body Dressing   Assistance Needed Physical assistance   Physical Assistance Level 76% or more   CARE Score - Lower Body Dressing 2   Lower Body Dressing Discharge Goal   Discharge Goal 4   Putting On/Taking Off Footwear   Assistance Needed Physical assistance   Physical Assistance Level 76% or more   CARE Score - Putting On/Taking Off Footwear 2   Putting On/Taking Off Footwear Discharge Goal   Discharge Goal 6   Toileting Hygiene   Assistance Needed Physical assistance   Physical Assistance Level 76% or more   CARE Score - Toileting Hygiene 2   Toileting Hygiene Discharge Goal   Discharge Goal 6   Toilet Transfer   Assistance Needed Physical assistance   Physical Assistance Level 76% or more   CARE Score - Toilet Transfer 2   Toilet Transfer Discharge Goal   Discharge Goal 6   Hearing, Speech, and Vision   Ability to Hear Adequate   Ability to See in Adequate Light Adequate   Expression of Ideas and Wants Without difficulty   Understanding Verbal and Non-Verbal Content Understands   Functional Level of Assist   Eating Independent   Grooming Supervision   Grooming Description Increased time;Set-up of equipment;Seated in wheelchair at sink   Upper Body Dressing Minimal Assist  (assist to pull shirt down in back)   Upper Body Dressing Description Increased time;Initial  preparation for task;Set-up of equipment;Supervision for safety   Lower Body Dressing Maximal Assist   Toileting Maximal Assist   Bed, Chair, Wheelchair Transfer Maximal Assist   Toilet Transfers Maximal Assist   Problem List   Problem List Decreased Active Daily Living Skills;Decreased Homemaking Skills;Decreased Upper Extremity Strength Right;Decreased Upper Extremity Strength Left;Decreased Functional Mobility;Decreased Activity Tolerance;Impaired Postural Control / Balance   Precautions   Precautions Fall Risk   Comments low BP, prothesis LLE   Current Discharge Plan   Current Discharge Plan Return to Prior Living Situation   Benefit    Therapy Benefit Patient Would Benefit from Inpatient Rehab Occupational Therapy to Maximize [place] with ADLs, IADLs and Functional Mobility.   Interdisciplinary Plan of Care Collaboration   Patient Position at End of Therapy In Bed;Bed Alarm On;Call Light within Reach;Tray Table within Reach;Phone within Reach   Strengths & Barriers   Strengths Able to follow instructions;Alert and oriented;Effective communication skills;Independent prior level of function;Pleasant and cooperative;Supportive family;Willingly participates in therapeutic activities   Barriers Decreased endurance;Fatigue;Generalized weakness;Impaired activity tolerance;Impaired balance       Assessment  Patient is 48 y.o. male with a diagnosis of acute diastolic heart failure. Past medical history of GERD, hypertension, severe rheumatoid arthritis previously on Humira, chronic pain history of BKA (completed inpatient rehab course3/21/2023 - 4/12/2023, depression and CHF who presented to [facility] on 8/12 with worsening shortness of breath.  Per documentation, patient had a recent hospitalization in July 2023 with cellulitis of the right lower extremity.  During that hospitalization an echo was completed that showed severe aortic regurgitation, moderate aortic stenosis and mild to moderate mitral  regurgitation as well as some calcified mitral mass measuring 3 cm.  Patient returned to the emergency department with shortness of breath.  At that time work-up showed pulmonary edema, labs notable for hyponatremia and elevated creatinine.  Patient required admission to the ICU, and patient was diuresed with IV Lasix.  Cardiology was consulted who recommended continued diuresis.  Patient's hospital course was notable for worsening liver function, right upper quadrant ultrasound was obtained and GI was consulted.  Right upper quadrant ultrasound was negative for acute findings and hepatitis panel was negative.    Pt lives in single story home, ramp to enter with wife and 2 young children. Pt's wife works FTM, but [employer] friends and others can assist as needed upon d/c. Pt was independent with all ADL's, mobility and IADL's from w/c level. Pt was only wearing prosthetic leg 2 hours at home x 1 month and not all at once. Pt has tub/shower combo with TTB and handheld shower head. Pt has drop arm commode over toilet. Pt currently presents with low BP (pt stating that its always low- refusing to wear binder), decreased endurance/strength, decreased standing bal/tolerance, generalized pain from RA, jaw pain x 2 weeks and hypersensitivity in L stump. Pt requires increased need for assist with ADL's and functional mobility at this time and will benefit from continued OT services in this setting to address these areas of concern and increased pt's independence so that he can safety return home with family.     Plan  Recommend Occupational Therapy  minutes per day 5-7 days per week for 3 weeks for the following treatments:  OT Self Care/ADL, OT Community Reintegration, OT Neuro Re-Ed/Balance, OT Sensory Int Techniques, OT Therapeutic Activity, OT Evaluation, and OT Therapeutic Exercise.    Passport items to be completed:  Perform bathroom transfers, complete dressing, complete feeding, get ready for the day, prepare a  simple meal, participate in household tasks, adapt home for safety needs, demonstrate home exercise program, complete caregiver training     Goals:  Long term and short term goals have been discussed with patient and they are in agreement.    Occupational Therapy Goals (Active)       Problem: Dressing       Dates: Start:  08/21/23         Goal: STG-Within one week, patient will dress UB with set-up assist        Dates: Start:  08/21/23            Goal: STG-Within one week, patient will dress LB with mod a        Dates: Start:  08/21/23               Problem: Functional Transfers       Dates: Start:  08/21/23         Goal: STG-Within one week, patient will transfer to toilet with mod a        Dates: Start:  08/21/23               Problem: OT Long Term Goals       Dates: Start:  08/21/23         Goal: LTG-By discharge, patient will complete basic self care tasks with mod I-SPV       Dates: Start:  08/21/23            Goal: LTG-By discharge, patient will perform bathroom transfers with mod I-SPV       Dates: Start:  08/21/23            Goal: LTG-By discharge, patient will complete basic home management with mod I-SPV from w/c level        Dates: Start:  08/21/23               Problem: Toileting       Dates: Start:  08/21/23         Goal: STG-Within one week, patient will complete toileting tasks with mod a        Dates: Start:  08/21/23

## 2023-08-21 NOTE — THERAPY
"Occupational Therapy  Daily Treatment     Patient Name: Richard Hubbard II  Age:  48 y.o., Sex:  male  Medical Record #: [id number]  Today's Date: 8/21/2023     Precautions  Precautions: Fall Risk  Comments: low BP, prothesis LLE         Subjective    \"I'm just so tired.\"      Objective       08/21/23 0901   OT Charge Group   OT Therapy Activity (Units) 1   OT Therapeutic Exercise (Units) 1   OT Total Time Spent   OT Individual Total Time Spent (Mins) 30   Cognition    Level of Consciousness Alert   Sitting Upper Body Exercises   Chest Press 2 sets of 10;Bilateral;Weight (See Comments for lbs)  (2 lb)   Bicep Curls 3 sets of 10;Bilateral;Weight (See Comments for lbs)  (2 lb)   Comments 2lb weight left in pts room for HEP   Interdisciplinary Plan of Care Collaboration   Patient Position at End of Therapy In Bed;Bed Alarm On;Call Light within Reach;Tray Table within Reach;Phone within Reach         Assessment  Pt tolerated session fair. Pt reported fatigue from this morning and requesting to stay in bed for therapy. Pt reported difficulty eating breakfast this morning 2/2 jaw pain x 2 weeks since the ICU. Pt was unable to eat bread or anything chewy- nutritionist in room at end of session- told pt to mention this to them. Pt completed 2 exercises for UB strengthening in bed, 2 lb weight left in room for HEP. Discussed nerve pain in L stump. Pt reported still having a lot of pain in L stump- discussed desensitization techniques I.e. tapping, rubbing, rubbing various textures on stump, vibration, mirror therapy. Educated pt on the importance of completing his HEP at home to maintain strength/endurance and reduce pain. Pt reported that it was too difficult to get HH PT in him home due to them having a 2 month wait list. Discussed having pt d/c to OP rather than HH after this visit- will need to look into what's available for pt prior to d/c.     Strengths: Able to follow instructions, Alert and oriented, Effective " communication skills, Independent prior level of function, Pleasant and cooperative, Supportive family, Willingly participates in therapeutic activities  Barriers: Decreased endurance, Fatigue, Generalized weakness, Impaired activity tolerance, Impaired balance    Plan    ADL's, IADL's at w/c level, standing bal/tolerance, strengthening/endurance, functional mobility     Passport items to be completed:  Perform bathroom transfers, complete dressing, complete feeding, get ready for the day, prepare a simple meal, participate in household tasks, adapt home for safety needs, demonstrate home exercise program, complete caregiver training     Occupational Therapy Goals (Active)       Problem: Dressing       Dates: Start:  08/21/23         Goal: STG-Within one week, patient will dress UB with set-up assist        Dates: Start:  08/21/23            Goal: STG-Within one week, patient will dress LB with mod a        Dates: Start:  08/21/23               Problem: Functional Transfers       Dates: Start:  08/21/23         Goal: STG-Within one week, patient will transfer to toilet with mod a        Dates: Start:  08/21/23               Problem: OT Long Term Goals       Dates: Start:  08/21/23         Goal: LTG-By discharge, patient will complete basic self care tasks with mod I-SPV       Dates: Start:  08/21/23            Goal: LTG-By discharge, patient will perform bathroom transfers with mod I-SPV       Dates: Start:  08/21/23            Goal: LTG-By discharge, patient will complete basic home management with mod I-SPV from w/c level        Dates: Start:  08/21/23               Problem: Toileting       Dates: Start:  08/21/23         Goal: STG-Within one week, patient will complete toileting tasks with mod a        Dates: Start:  08/21/23

## 2023-08-21 NOTE — CARE PLAN
"  Problem: Knowledge Deficit - Standard  Goal: Patient and family/care givers will demonstrate understanding of plan of care, disease process/condition, diagnostic tests and medications  Outcome: Progressing  Note: Pt agrees with plan of care tonight regarding medications and safety.  Will continue to monitor patient.      Problem: Pain - Standard  Goal: Alleviation of pain or a reduction in pain to the patient’s comfort goal  Outcome: Progressing  Note: Complains of generalized pain , medicated with scheduled Morphine IR and ER.  Has + relief.  See MAR and doc flow sheet.  Will continue to monitor patient.     Problem: Fall Risk - Rehab  Goal: Patient will remain free from falls  Outcome: Progressing  Note: Osiris Healy Fall risk Assessment Score:  15    High fall risk Interventions   - Alarming seatbelt  - Wander guard  - Bed and strip alarm   - Yellow sign by the door   - Yellow wrist band \"Fall risk\"  - Room near to the nurse station  - Do not leave patient unattended in the bathroom  - Fall risk education provided        The patient is Stable - Low risk of patient condition declining or worsening    Shift Goals  Clinical Goals: Safety  Patient Goals: Sleep well    Progress made toward(s) clinical / shift goals:  progressing          "

## 2023-08-21 NOTE — PROGRESS NOTES
"  Physical Medicine & Rehabilitation Progress Note    Encounter Date: 8/21/2023    Chief Complaint: Decreased mobility, weakness    Interval Events (Subjective):  Patient sitting up in therapy gym. He reports he is doing well. Denies NVD. He reports the renal diet is restricting his intake. Discussed will adjust it as long as he monitors his fluid intake and salt intake.     Objective:  VITAL SIGNS: /53   Pulse 81   Temp 36.7 °C (98 °F) (Oral)   Resp 17   Ht 1.803 m (5' 11\")   Wt 79.5 kg (175 lb 4.3 oz)   SpO2 93%   BMI 24.44 kg/m²   Gen: NAD  Psych: Mood and affect appropriate  CV: RRR, 0 edema  Resp: CTAB, no upper airway sounds  Abd: NTND  Neuro: AOx4, following commands    Laboratory Values:  Recent Results (from the past 72 hour(s))   CBC WITHOUT DIFFERENTIAL    Collection Time: 08/19/23 12:07 AM   Result Value Ref Range    WBC 9.2 4.8 - 10.8 K/uL    RBC 4.11 (L) 4.70 - 6.10 M/uL    Hemoglobin 11.5 (L) 14.0 - 18.0 g/dL    Hematocrit 36.2 (L) 42.0 - 52.0 %    MCV 88.1 81.4 - 97.8 fL    MCH 28.0 27.0 - 33.0 pg    MCHC 31.8 (L) 32.3 - 36.5 g/dL    RDW 49.7 35.9 - 50.0 fL    Platelet Count 234 164 - 446 K/uL    MPV 9.9 9.0 - 12.9 fL   Basic Metabolic Panel    Collection Time: 08/19/23 12:07 AM   Result Value Ref Range    Sodium 133 (L) 135 - 145 mmol/L    Potassium 4.6 3.6 - 5.5 mmol/L    Chloride 91 (L) 96 - 112 mmol/L    Co2 33 20 - 33 mmol/L    Glucose 83 65 - 99 mg/dL    Bun 24 (H) 8 - 22 mg/dL    Creatinine 1.02 0.50 - 1.40 mg/dL    Calcium 8.5 8.4 - 10.2 mg/dL    Anion Gap 9.0 7.0 - 16.0   ESTIMATED GFR    Collection Time: 08/19/23 12:07 AM   Result Value Ref Range    GFR (CKD-EPI) 91 >60 mL/min/1.73 m 2   CBC with Differential    Collection Time: 08/21/23  5:22 AM   Result Value Ref Range    WBC 9.4 4.8 - 10.8 K/uL    RBC 4.03 (L) 4.70 - 6.10 M/uL    Hemoglobin 11.3 (L) 14.0 - 18.0 g/dL    Hematocrit 35.1 (L) 42.0 - 52.0 %    MCV 87.1 81.4 - 97.8 fL    MCH 28.0 27.0 - 33.0 pg    MCHC 32.2 (L) " 32.3 - 36.5 g/dL    RDW 48.6 35.9 - 50.0 fL    Platelet Count 208 164 - 446 K/uL    MPV 9.6 9.0 - 12.9 fL    Neutrophils-Polys 51.60 44.00 - 72.00 %    Lymphocytes 34.90 22.00 - 41.00 %    Monocytes 10.30 0.00 - 13.40 %    Eosinophils 1.70 0.00 - 6.90 %    Basophils 0.30 0.00 - 1.80 %    Immature Granulocytes 1.20 (H) 0.00 - 0.90 %    Nucleated RBC 0.00 0.00 - 0.20 /100 WBC    Neutrophils (Absolute) 4.88 1.82 - 7.42 K/uL    Lymphs (Absolute) 3.29 1.00 - 4.80 K/uL    Monos (Absolute) 0.97 (H) 0.00 - 0.85 K/uL    Eos (Absolute) 0.16 0.00 - 0.51 K/uL    Baso (Absolute) 0.03 0.00 - 0.12 K/uL    Immature Granulocytes (abs) 0.11 0.00 - 0.11 K/uL    NRBC (Absolute) 0.00 K/uL   Comp Metabolic Panel (CMP)    Collection Time: 08/21/23  5:22 AM   Result Value Ref Range    Sodium 132 (L) 135 - 145 mmol/L    Potassium 4.2 3.6 - 5.5 mmol/L    Chloride 95 (L) 96 - 112 mmol/L    Co2 31 20 - 33 mmol/L    Anion Gap 6.0 (L) 7.0 - 16.0    Glucose 80 65 - 99 mg/dL    Bun 23 (H) 8 - 22 mg/dL    Creatinine 1.05 0.50 - 1.40 mg/dL    Calcium 8.3 (L) 8.5 - 10.5 mg/dL    Correct Calcium 9.3 8.5 - 10.5 mg/dL    AST(SGOT) 26 12 - 45 U/L    ALT(SGPT) 61 (H) 2 - 50 U/L    Alkaline Phosphatase 127 (H) 30 - 99 U/L    Total Bilirubin 0.5 0.1 - 1.5 mg/dL    Albumin 2.7 (L) 3.2 - 4.9 g/dL    Total Protein 6.3 6.0 - 8.2 g/dL    Globulin 3.6 (H) 1.9 - 3.5 g/dL    A-G Ratio 0.8 g/dL   HEMOGLOBIN A1C    Collection Time: 08/21/23  5:22 AM   Result Value Ref Range    Glycohemoglobin 4.8 4.0 - 5.6 %    Est Avg Glucose 91 mg/dL   Vitamin D, 25-hydroxy (blood)    Collection Time: 08/21/23  5:22 AM   Result Value Ref Range    25-Hydroxy   Vitamin D 25 21 (L) 30 - 100 ng/mL   Magnesium    Collection Time: 08/21/23  5:22 AM   Result Value Ref Range    Magnesium 2.1 1.5 - 2.5 mg/dL   Phosphorus    Collection Time: 08/21/23  5:22 AM   Result Value Ref Range    Phosphorus 4.1 2.5 - 4.5 mg/dL   ESTIMATED GFR    Collection Time: 08/21/23  5:22 AM   Result Value Ref  Range    GFR (CKD-EPI) 88 >60 mL/min/1.73 m 2       Medications:  Scheduled Medications   Medication Dose Frequency    Pharmacy Consult Request  1 Each PHARMACY TO DOSE    senna-docusate  2 Tablet BID    omeprazole  20 mg DAILY    heparin  5,000 Units Q8HRS    dapagliflozin propanediol  10 mg DAILY    DULoxetine  60 mg DAILY    morphine  30 mg 4X/DAY    morphine ER  15 mg BID    torsemide  40 mg Q DAY    potassium chloride SA  10 mEq DAILY    melatonin  3 mg QHS     PRN medications: Respiratory Therapy Consult, senna-docusate **AND** polyethylene glycol/lytes **AND** magnesium hydroxide **AND** bisacodyl, mag hydrox-al hydrox-simeth, sodium chloride, acetaminophen, naloxone    Diet:  Current Diet Order   Procedures    Diet Order Diet: Level 7 - Easy to Chew (advance as tolerated); Liquid level: Level 0 - Thin       Medical Decision Making and Plan:  CHF exacerbation  - Originally presented with shortness of breath and pulmonary edema as well as cardiorenal syndrome  -While at [facility], patient was on IV Lasix for diuresis  - Cardiology was consulted at [facility] for patient's known aortic and mitral regurgitation, EF 55%  - Patient has been transferred on torsemide 40 mg daily, discussed new medication with [facility] hospitalist Dr. Wynne.  - Consult hospitalist  - Continue farxiga   -PT and OT for mobility and ADLs. Per guidelines, 15 hours per week between PT, OT and/or SLP.  -Follow-up with cardiology     Hypokalemia  - Transferred on potassium 10 mg daily  - Follow-up admission labs in the morning     Anxiety depression  - Continue home dose Cymbalta     Insomnia  - Avoiding trazodone, patient had prolonged QTc from prior EKG  - New EKG ordered to evaluate QTc  - Replace standing as needed trazodone for melatonin     Rheumatoid arthritis  - Is on Humira q. 2 weeks  - Last dose 8/16  -Has previously been on steroids, [facility], may consider restarting if  patient has an RA flare     History of left BKA  - Previously at rehab in March/April 2023  - Monitor for edema control and residual limb for proper fit with prosthesis, wife will be bringing left BKA prosthesis     Chronic pain  - Chronically followed by pain management  - Is on home dose morphine 30 mg 4 times a day as well as MS Contin 15 mg twice daily  - Do not anticipate patient will need increased as needed pain medications  - Naloxone as needed narcotics overdose     Vitamin D deficiency  21 on admission.  - Start 1000 U     Skin - Patient at risk for skin breakdown due to debility in areas including sacrum, achilles, elbows and head in addition to other sites. Nursing to assess skin daily.      GI Ppx - Patient on Prilosec for GERD prophylaxis. Patient on Senna-docusate for constipation prophylaxis.      DVT Ppx - heparin   ____________________________________    T. Loi Pisano MD/PhD  [facility] - Physical Medicine & Rehabilitation   [facility] - Brain Injury Medicine   ____________________________________    Total time:  50 minutes. Time spent included pre-rounding review of vitals and tests, unit/floor time, face-to-face time with the patient including physical examination, care coordination, counseling of patient and/or family, ordering medications/procedures/tests, discussion with CM, PT, OT, SLP and/or other healthcare providers, and documentation in the electronic medical record. Topics discussed included admission labs, anemia, vitamin D deficiency and hospitalist consult. Patient's case was discussed face to face with Hospitalist on [facility] floor.

## 2023-08-21 NOTE — PROGRESS NOTES
NURSING DAILY NOTE    Name: Richard Hubbard II   Date of Admission: 8/20/2023   Admitting Diagnosis: Acute diastolic heart failure (HCC)  Attending Physician: Keaton Pisano M.d.  Allergies: Nsaids, Diphenhydramine, Mirtazapine, Penicillins, and Promethazine    Safety  Patient Assist  Mod Assist  Patient Precautions     Precaution Comments     Bed Transfer Status     Toilet Transfer Status      Assistive Devices  Rails, Wheelchair  Oxygen  None - Room Air  Diet/Therapeutic Dining  Current Diet Order   Procedures    Diet Order Diet: Level 7 - Easy to Chew (advance as tolerated); Liquid level: Level 0 - Thin; Second Modifier: (optional): Renal; Fluid modifications: (optional): 1500 ml Fluid Restriction     Pill Administration  whole  Agitated Behavioral Scale     ABS Level of Severity       Fall Risk  Has the patient had a fall this admission?   No  Osiris Healy Fall Risk Scoring  15, HIGH RISK  Fall Risk Safety Measures  bed alarm and Bed strip alarm    Vitals  Temperature: 36.7 °C (98 °F)  Temp src: Oral  Pulse: 76  Respiration: 18  Blood Pressure: 90/50  Blood Pressure MAP (Calculated): 63 MM HG  BP Location: Right, Upper Arm  Patient BP Position: Supine     Oxygen  Pulse Oximetry: 96 %  O2 (LPM): 0  FiO2%: 21 %  O2 Delivery Device: None - Room Air    Bowel and Bladder  Last Bowel Movement  08/12/23  Stool Type     Bowel Device  Bathroom, Other (Comment) (Bowel Meds)  Continent  Bladder: Continent void (Rivera catheter)   Bowel: Continent movement  Bladder Function  Urine Color: Yellow  Genitourinary Assessment   Bladder Assessment (WDL):  WDL Except  Rivera Catheter: Present with Active Order  Rivera Reasons per MD Order: Acute urinary retention or bladder outlet obstruction  Urinary Elimination: Catheter (Document on LDA)  Urine Color: Yellow  Bladder Device: Indwelling Catheter    Skin  Nam Score   18  Sensory Interventions      Moisture  Interventions  Moisturizers/Barriers: Barrier Wipes, Barrier Paste      Pain  Pain Rating Scale  4 - Distracts me, can do usual activities  Pain Location  Generalized  Pain Location Orientation     Pain Interventions   Rest    ADLs    Bathing      Linen Change      Personal Hygiene     Chlorhexidine Bath      Oral Care     Teeth/Dentures  Broken Teeth (Comments)  Shave     Nutrition Percentage Eaten     Environmental Precautions  Treaded Slipper Socks on Patient, Bed in Low Position  Patient Turns/Positioning  Patient Turns Self from Side to Side  Patient Turns Assistance/Tolerance     Bed Positions  Bed Controls On, Bed Locked  Head of Bed Elevated  Self regulated      Psychosocial/Neurologic Assessment  Psychosocial Assessment  Psychosocial (WDL):  WDL Except  Patient Behaviors: Fatigue  Neurologic Assessment  Neuro (WDL): Within Defined Limits  Level of Consciousness: Alert  Orientation Level: Oriented X4  Cognition: Follows commands  Speech: Clear  Pupil Assesment: Yes  R Pupil Size (mm): 2  R Pupil Shape / Description: Round  R Pupil Reaction: Brisk  L Pupil Size (mm): 2  L Pupil Shape / Description: Round  L Pupil Reaction: Brisk  EENT (WDL):  WDL Except    Cardio/Pulmonary Assessment  Edema      Respiratory Breath Sounds  RUL Breath Sounds: Clear  RML Breath Sounds: Clear, Diminished  RLL Breath Sounds: Clear, Diminished  DONALD Breath Sounds: Clear  LLL Breath Sounds: Clear, Diminished  Cardiac Assessment   Cardiac (WDL):  WDL Except (H/O HTN, CHF.)

## 2023-08-21 NOTE — THERAPY
Physical Therapy   Daily Treatment     Patient Name: Richard Hubbard II  Age:  48 y.o., Sex:  male  Medical Record #: [id number]  Today's Date: 8/21/2023     Precautions  Precautions: Fall Risk  Comments: low BP, prothesis LLE    Subjective    Pt resting in bed, willing to participate.     Objective       08/21/23 1331   PT Charge Group   PT Therapeutic Exercise (Units) 2   PT Total Time Spent   PT Individual Total Time Spent (Mins) 30   Wheelchair Functional Level of Assist   Wheelchair Assist Supervised   Distance Wheelchair (Feet or Distance) 100   Wheelchair Description Limited by fatigue   Transfer Functional Level of Assist   Bed, Chair, Wheelchair Transfer Moderate Assist   Bed Chair Wheelchair Transfer Description Adaptive equipment;Increased time;Requires lift   Supine Lower Body Exercise   Supine Lower Body Exercises Yes   Bridges Two Legged;2 sets of 10  (completed in BKA position with LLE on half bolster, second sets 2 x 10 with BLE's on large bolster wedge of increased lift.)   Hip Adduction  2 sets of 10  (JONATHAN towel squeeze)   Short Arc Quad 2 sets of 10   Gluteal Isometrics 2 sets of 10   Quadriceps Isometrics 2 sets of 10   Bed Mobility    Supine to Sit Standby Assist   Sit to Supine Contact Guard Assist   Sit to Stand Moderate Assist         Assessment    Pt requires cues for pacing / energy conservation and diaphragmatic breathing with supine exercises as noted. Requires cues for set-up/ safety and controlled landing with reach-pivot vs stand-pivot transfers.    Strengths: Able to follow instructions, Independent prior level of function, Motivated for self care and independence, Pleasant and cooperative, Supportive family, Willingly participates in therapeutic activities  Barriers: Decreased endurance, Fatigue, Generalized weakness, Hypotension, Impaired balance, Impaired activity tolerance, Limited mobility (ill fitting prosthesis, poly-joint RA)    Plan    Wc endurance, prosthetics assessment  8/22 @ 11:00 with [employer] prosthetics, sit<>stand and prgoressive gait training when L BKA prosthetic fit adjusted, mat program    Passport items to be completed:  Get in/out of bed safely, in/out of a vehicle, safely use mobility device, walk or wheel around home/community, navigate up and down stairs, show how to get up/down from the ground, ensure home is accessible, demonstrate HEP, complete caregiver training    Physical Therapy Problems (Active)       Problem: Balance       Dates: Start:  08/21/23         Goal: STG-Within one week, patient will maintain static standing 2 minutes x 3 with  UE support at // bars for wt shifting/ prosthesis education and limb desensitization       Dates: Start:  08/21/23               Problem: Mobility       Dates: Start:  08/21/23         Goal: STG-Within one week, patient will propel wheelchair household distances modified independent 50 ft x 2       Dates: Start:  08/21/23            Goal: STG-Within one week, patient will ambulate household distance min A with FWW x 25 ft       Dates: Start:  08/21/23               Problem: Mobility Transfers       Dates: Start:  08/21/23         Goal: STG-Within one week, patient will perform bed mobility modified independent       Dates: Start:  08/21/23            Goal: STG-Within one week, patient will transfer bed to chair CGA for stand pivot without prosthesis vs stand-step with FWW and prosthesis       Dates: Start:  08/21/23               Problem: PT-Long Term Goals       Dates: Start:  08/21/23         Goal: LTG-By discharge, patient will tolerate standing 5 minutes x 2 for wt shifting/ balance training and prosthesis training/ tolerance       Dates: Start:  08/21/23            Goal: LTG-By discharge, patient will propel wheelchair modified independent 50 ft x 2       Dates: Start:  08/21/23            Goal: LTG-By discharge, patient will ambulate SBA with FWW/ L prosthesis x 50 ft       Dates: Start:  08/21/23            Goal: LTG-By  discharge, patient will transfer one surface to another modified independent with or without prosthesis       Dates: Start:  08/21/23            Goal: LTG-By discharge, patient will transfer in/out of a car modified independent with or without prosthesis       Dates: Start:  08/21/23

## 2023-08-21 NOTE — PROGRESS NOTES
Confirmed last bowel movement of 8/12/23.  Offered laxatives this morning.  Refuses to have Miralax, requesting for Milk of Magnesia and given.  Will continue to monitor patient.

## 2023-08-21 NOTE — THERAPY
Physical Therapy   Initial Evaluation     Patient Name: Richard Hubbard II  Age:  48 y.o., Sex:  male  Medical Record #: [id number]  Today's Date: 8/21/2023     Subjective    Pt resting in bed, willing to participate.     Objective       08/21/23 1031   PT Charge Group   Charges Yes   PT Therapeutic Exercise (Units) 1   PT Evaluation PT Evaluation Mod   PT Total Time Spent   PT Individual Total Time Spent (Mins) 60   Prior Living Situation   Prior Services Home-Independent  (intermittent home health)   Housing / Facility 1 Story House   Steps Into Home   (ramp)   Lives with - Patient's Self Care Capacity Spouse;Child Less than 18 Years of Age   Prior Level of Functional Mobility   Bed Mobility Independent   Transfer Status Independent   Ambulation Required Assist  (pt reports 2 falls 2* ill fitting prosthesis)   Distance Ambulation (Feet)   (household)   Assistive Devices Used Front-Wheel Walker  (L BKA prosthesis)   Wheelchair Independent   Stairs   (NA)   Prior Functioning: Everyday Activities   Self Care Independent   Indoor Mobility (Ambulation) Independent   Stairs Not applicable   Functional Cognition Independent   Prior Device Use Manual wheelchair;Walker;Orthotics/Prosthetics   Passive ROM Lower Body   Passive ROM Lower Body WDL   Comments R ankle joint deformity 2* RA/ charcot foot   Active ROM Lower Body    Active ROM Lower Body  WDL   Strength Lower Body   Lower Body Strength  X   Rt Hip Flexion Strength 3+ (F+)   Rt Hip Abduction Strength 3+ (F+)   Rt Hip Adduction Strength 3+ (F+)   Comments otherwise strength grossly 4+ throughout   Lower Body Muscle Tone   Lower Body Muscle Tone  WDL   Balance Assessment   Sitting Balance (Static) Fair +   Sitting Balance (Dynamic) Fair   Standing Balance (Static) Poor +   Standing Balance (Dynamic) Poor +   Weight Shift Sitting Fair   Weight Shift Standing Fair   Comments UE support at // bars and L BKA prosthesis required for standing activity   Bed Mobility     Supine to Sit Standby Assist   Sit to Supine Minimal Assist   Sit to Stand Moderate Assist   Scooting Standby Assist   Rolling Supervised   Neurological Concerns   Neurological Concerns No   Coordination Lower Body    Coordination Lower Body  WDL   Roll Left and Right   Assistance Needed Supervision   CARE Score - Roll Left and Right 4   Roll Left and Right Discharge Goal   Discharge Goal 6   Sit to Lying   Assistance Needed Physical assistance   Physical Assistance Level 25% or less   CARE Score - Sit to Lying 3   Sit to Lying Discharge Goal   Discharge Goal 6   Lying to Sitting on Side of Bed   Assistance Needed Supervision   CARE Score - Lying to Sitting on Side of Bed 4   Lying to Sitting on Side of Bed Discharge Goal   Discharge Goal 6   Sit to Stand   Assistance Needed Physical assistance   Physical Assistance Level 51%-75%   CARE Score - Sit to Stand 2   Sit to Stand Discharge Goal   Discharge Goal 5   Chair/Bed-to-Chair Transfer   Assistance Needed Physical assistance   Physical Assistance Level 51%-75%   CARE Score - Chair/Bed-to-Chair Transfer 2   Chair/Bed-to-Chair Transfer Discharge Goal   Discharge Goal 5   Car Transfer   Comment low activity tolerance/ hypotensive/ ill fitting prosthesis   Reason if not Attempted Safety concerns   CARE Score - Car Transfer 88   Car Transfer Discharge Goal   Discharge Goal 5   Walk 10 Feet   Comment low activity tolerance/ hypotensive/ ill fitting prosthesis   Reason if not Attempted Safety concerns   CARE Score - Walk 10 Feet 88   Walk 10 Feet Discharge Goal   Discharge Goal 4   Walk 50 Feet with Two Turns   Comment low activity tolerance/ hypotensive/ ill fitting prosthesis   Reason if not Attempted Safety concerns   CARE Score - Walk 50 Feet with Two Turns 88   Walk 50 Feet with Two Turns Discharge Goal   Discharge Goal 4   Walk 150 Feet   Comment low activity tolerance/ hypotensive/ ill fitting prosthesis   Reason if not Attempted Safety concerns   CARE Score -  Walk 150 Feet 88   Walk 150 Feet Discharge Goal   Discharge Goal 88   Walking 10 Feet on Uneven Surfaces   Comment low activity tolerance/ hypotensive/ ill fitting prosthesis   Reason if not Attempted Safety concerns   CARE Score - Walking 10 Feet on Uneven Surfaces 88   Walking 10 Feet on Uneven Surfaces Discharge Goal   Discharge Goal 4   1 Step (Curb)   Comment low activity tolerance/ hypotensive/ ill fitting prosthesis   Reason if not Attempted Safety concerns   CARE Score - 1 Step (Curb) 88   1 Step (Curb) Discharge Goal   Discharge Goal 3   4 Steps   Reason if not Attempted Activity not applicable   CARE Score - 4 Steps 9   4 Steps Discharge Goal   Discharge Goal 9   12 Steps   Reason if not Attempted Activity not applicable   CARE Score - 12 Steps 9   12 Steps Discharge Goal   Discharge Goal 9   Picking Up Object   Comment low activity tolerance/ hypotensive/ ill fitting prosthesis   Reason if not Attempted Safety concerns   CARE Score - Picking Up Object 88   Picking Up Object Discharge Goal   Discharge Goal 3   Wheel 50 Feet with Two Turns   Assistance Needed Physical assistance   Physical Assistance Level 51%-75%   CARE Score - Wheel 50 Feet with Two Turns 2   Type of Wheelchair/Scooter Manual   Wheel 50 Feet with Two Turns Discharge Goal   Discharge Goal 6   Wheel 150 Feet   Assistance Needed Physical assistance   Physical Assistance Level 76% or more   CARE Score - Wheel 150 Feet 2   Type of Wheelchair/Scooter Manual   Wheel 150 Feet Discharge Goal   Discharge Goal 4   Gait Functional Level of Assist    Gait Level Of Assist Minimal Assist   Assistive Device Parallel Bars  (L BKA prosthesis however prosthesis is ill fitting at present)   Distance (Feet) 10   # of Times Distance was Traveled 2   Deviation Bradykinetic;Decreased Base Of Support   Wheelchair Functional Level of Assist   Wheelchair Assist Stand by Assist   Distance Wheelchair (Feet or Distance) 20   Wheelchair Description Extra time;Limited  by fatigue   Stairs Functional Level of Assist   Level of Assist with Stairs Unable to Participate   Transfer Functional Level of Assist   Bed, Chair, Wheelchair Transfer Moderate Assist  (mod/max A reach - pivot  without prosthesis)   Bed Chair Wheelchair Transfer Description Adaptive equipment;Increased time;Requires lift;Set-up of equipment;Squat pivot transfer to wheelchair;Verbal cueing   Problem List    Problems Impaired Transfers;Impaired Ambulation;Functional Strength Deficit;Impaired Balance;Decreased Activity Tolerance  (ill fitting prosthesis/ poly-joint OA/ RA)   Precautions   Precautions Fall Risk   Current Discharge Plan   Current Discharge Plan Return to Prior Living Situation   Interdisciplinary Plan of Care Collaboration   Patient Position at End of Therapy In Bed;Bed Alarm On;Call Light within Reach;Tray Table within Reach;Phone within Reach   Benefit   Therapy Benefit Patient Would Benefit from Inpatient Rehabilitation Physical Therapy to Maximize Functional Hazard with ADLs, IADLs and Mobility.   Strengths & Barriers   Strengths Able to follow instructions;Independent prior level of function;Motivated for self care and independence;Pleasant and cooperative;Supportive family;Willingly participates in therapeutic activities   Barriers Decreased endurance;Fatigue;Generalized weakness;Hypotension;Impaired balance;Impaired activity tolerance;Limited mobility  (ill fitting prosthesis, poly-joint RA)     Pt familiar with rehab/ PT and POC.   Pt completed gait endurance with // bars and L BKA prosthesis 10 ft x 2 with min A, prosthesis is poorly fitting, too large and provides minimal surface contact with residual limb at this time. Pt will require re-assessment from prosthetists, may need new socket fabricated due to large size of current prosthesis and small residual limb with questionable atrophy. Pt reports having had LE edema on and off throughout past few months during prosthetic  fabrication.    Pt completed seated exercises 2 x 10 for hip flex, abd/ add, LAQ's/ hamstring curls and R ankle pumps.     Assessment  Patient is 48 y.o. male with a diagnosis of  acute diastolic heart failure. Past medical history of GERD, hypertension, severe rheumatoid arthritis previously on Humira, chronic pain history of BKA (completed inpatient rehab course3/21/2023 - 4/12/2023, depression and CHF who presented to [facility] on 8/12 with worsening shortness of breath.  Per documentation, patient had a recent hospitalization in July 2023 with cellulitis of the right lower extremity.  During that hospitalization an echo was completed that showed severe aortic regurgitation, moderate aortic stenosis and mild to moderate mitral regurgitation as well as some calcified mitral mass measuring 3 cm.  Patient returned to the emergency department with shortness of breath.  At that time work-up showed pulmonary edema, labs notable for hyponatremia and elevated creatinine.  Patient required admission to the ICU, and patient was diuresed with IV Lasix.  Cardiology was consulted who recommended continued diuresis.  Patient's hospital course was notable for worsening liver function, right upper quadrant ultrasound was obtained and GI was consulted.  Right upper quadrant ultrasound was negative for acute findings and hepatitis panel was negative.      Pt was independent prior to admit from  level mobility.  Pt currently presents to rehab with low activity tolerance / shortness of breath with exertion, slightly hypotensive, requires mod/max A for safety and controlled landing with wc<>bed transfer via squat-pivot vs stand pivot transfer, requires SBA/ min A for bed mobility, demonstrated generalized weakness/ debility, significant RLE joint deformities, R hip/ knee pain and weakness and requires UE support at // bars for initiation of gait activity x 10 ft with min A due to poorly fitting L BKA prosthesis. Pt  should benefit from rehab PT to maximize wc level independence, improve functional strength and endurance, assist with prosthetic fit and wear schedule and improve overall gait tolerance/ safety and standing balance for safe return home with spouse and supportive friends.    Plan  Recommend Physical Therapy  minutes per day 5-6 days per week for 2 weeks for the following treatments:  PT Group Therapy, PT Prosthetic Training, PT Gait Training, PT Self Care/Home Eval, PT Therapeutic Exercises, PT Neuro Re-Ed/Balance, PT Therapeutic Activity, PT Manual Therapy, and PT Evaluation.    Passport items to be completed:  Get in/out of bed safely, in/out of a vehicle, safely use mobility device, walk or wheel around home/community, navigate up and down stairs, show how to get up/down from the ground, ensure home is accessible, demonstrate HEP, complete caregiver training    Goals:  Long term and short term goals have been discussed with patient and they are in agreement.    Physical Therapy Problems (Active)       Problem: Balance       Dates: Start:  08/21/23         Goal: STG-Within one week, patient will maintain static standing       Dates: Start:  08/21/23               Problem: Mobility       Dates: Start:  08/21/23         Goal: STG-Within one week, patient will propel wheelchair household distances       Dates: Start:  08/21/23            Goal: STG-Within one week, patient will ambulate household distance       Dates: Start:  08/21/23               Problem: Mobility Transfers       Dates: Start:  08/21/23         Goal: STG-Within one week, patient will perform bed mobility       Dates: Start:  08/21/23            Goal: STG-Within one week, patient will transfer bed to chair       Dates: Start:  08/21/23               Problem: PT-Long Term Goals       Dates: Start:  08/21/23         Goal: LTG-By discharge, patient will tolerate standing       Dates: Start:  08/21/23            Goal: LTG-By discharge, patient will  propel wheelchair       Dates: Start:  08/21/23            Goal: LTG-By discharge, patient will ambulate       Dates: Start:  08/21/23            Goal: LTG-By discharge, patient will transfer one surface to another       Dates: Start:  08/21/23            Goal: LTG-By discharge, patient will transfer in/out of a car       Dates: Start:  08/21/23

## 2023-08-21 NOTE — CONSULTS
"  HOSPITAL MEDICINE CONSULTATION    Requesting Physician:  Dr. Cody    Reason for Consult:  Volume management    History of Present Illness:  The patient is a 48-year-old  male with past medical history significant for rheumatoid arthritis, chronic pain syndrome, gastric bypass surgery, and left below knee amputation.  He was admitted to [facility] on 8/12/23 for progressive dyspnea.  He was treated for valvular cardiomyopathy with cardiorenal syndrome.  Echocardiogram showed ejection fraction 55%, moderate mitral regurgitation/aortic stenosis/aortic regurgitation, and right ventricular systolic pressure 50 mmHg.  He was aggressively diuresed with intravenous furosemide then transitioned to oral torsemide.  Due to his ongoing functional debility, the patient was transferred to [facility] on 8/20/23.  Hospital Medicine consultation is requested to assist in the management of this patient's volume status.    Review of Systems:  Review of Systems   Constitutional:  Negative for chills and fever.   HENT: Negative.     Eyes: Negative.    Respiratory:  Negative for cough and shortness of breath.    Cardiovascular:  Negative for chest pain and palpitations.   Gastrointestinal:  Negative for abdominal pain, nausea and vomiting.   Genitourinary: Negative.    Musculoskeletal:  Positive for joint pain.   Skin:  Negative for itching and rash.   Endo/Heme/Allergies:  Negative for polydipsia. Does not bruise/bleed easily.   All other systems reviewed and are negative.      Allergies:  Allergies   Allergen Reactions    Nsaids      Bleeding, \"I had a bleeding ulcer\"    Diphenhydramine Unspecified     \"I get agitated\"    Mirtazapine Unspecified     I had a hard time waking up, lacking mental focus.     Penicillins      \"Had some dizziness\"  Tolerated Unasyn 10/2019  Tolerated Zosyn 02/2023    Promethazine Unspecified     \"I get very agitated\"       Medications:    Current " Facility-Administered Medications:     [START ON 8/22/2023] vitamin D3 (Cholecalciferol) tablet 1,000 Units, 1,000 Units, Oral, DAILY, Keaton Pisano M.D.    [START ON 8/22/2023] torsemide (Demadex) tablet 20 mg, 20 mg, Oral, Q DAY, Iris Collins M.D.    Respiratory Therapy Consult, , Nebulization, Continuous RT, Angelique Cody D.O.    Pharmacy Consult Request ...Pain Management Review 1 Each, 1 Each, Other, PHARMACY TO DOSE, Angelique Cody D.O.    senna-docusate (Pericolace Or Senokot S) 8.6-50 MG per tablet 2 Tablet, 2 Tablet, Oral, BID, 2 Tablet at 08/21/23 0805 **AND** polyethylene glycol/lytes (Miralax) PACKET 1 Packet, 1 Packet, Oral, QDAY PRN **AND** magnesium hydroxide (Milk Of Magnesia) suspension 30 mL, 30 mL, Oral, QDAY PRN, 30 mL at 08/21/23 0608 **AND** bisacodyl (Dulcolax) suppository 10 mg, 10 mg, Rectal, QDAY PRN, SIDDHARTH CarboneO., 10 mg at 08/20/23 1633    omeprazole (PriLOSEC) capsule 20 mg, 20 mg, Oral, DAILY, SIDDHARTH CarboneO., 20 mg at 08/21/23 0805    mag hydrox-al hydrox-simeth (Maalox Plus Es Or Mylanta Ds) suspension 20 mL, 20 mL, Oral, Q2HRS PRN, SIDDHARTH CarboneO.    sodium chloride (Ocean) 0.65 % nasal spray 2 Spray, 2 Spray, Nasal, PRN, SIDDHARTH CarboneO.    heparin injection 5,000 Units, 5,000 Units, Subcutaneous, Q8HRS, SIDDHARTH CarboneOMary, 5,000 Units at 08/21/23 1333    dapagliflozin propanediol (Farxiga) tablet 10 mg, 10 mg, Oral, DAILY, SIDDHARTH CarboneO., 10 mg at 08/21/23 0805    DULoxetine (Cymbalta) capsule 60 mg, 60 mg, Oral, DAILY, SIDDHARTH CarboneOMary, 60 mg at 08/21/23 0805    morphine (MS IR) tablet 30 mg, 30 mg, Oral, 4X/DAY, GINA Carbone.O., 30 mg at 08/21/23 1333    morphine ER (Ms Contin) tablet 15 mg, 15 mg, Oral, BID, SIDDHARTH CarboneOMary, 15 mg at 08/21/23 0805    potassium chloride SA (Kdur) tablet 10 mEq, 10 mEq, Oral, DAILY, GINA Carbone.O., 10 mEq at 08/21/23 0805    acetaminophen (Tylenol) tablet 500 mg, 500 mg, Oral, Q6HRS PRN,  Angelique Cody D.O.    melatonin tablet 3 mg, 3 mg, Oral, QHS, Angelique Cody D.O., 3 mg at 08/20/23 2150    naloxone (Narcan) injection 0.4 mg, 0.4 mg, Intravenous, Once PRN, Angelique Cody D.O.    Past Medical/Surgical History:  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    Hemorrhagic disorder (HCC)     hx of bleeding ulcers    History of bleeding ulcers 2/12/2015    Hypertension 05/03/2021    pt states well controlled on meds    Insomnia 5/21/2009    Migraine 5/21/2009    Morning headache     Obesity, morbid (HCC) 5/21/2009    Osteomyelitis ([facility]) 10/7/2019    Pain 09/2019    Chronic knee and back pain    Pubic ramus fracture ([facility]) 9/28/2019    Rheumatoid arteritis ([facility]) 09/2019    knee, feet right hand    Sleep apnea 5/21/2009    Did not tolerate cpap, does not use it    Snoring      Past Surgical History:   Procedure Laterality Date    KNEE AMPUTATION BELOW Left 3/9/2023    Procedure: AMPUTATION, BELOW KNEE;  Surgeon: Wayne Chambers M.D.;  Location: SURGERY [facility];  Service: Orthopedics    PB TOTAL KNEE ARTHROPLASTY Right 7/22/2020    Procedure: ARTHROPLASTY, KNEE, TOTAL;  Surgeon: Temo Pires M.D.;  Location: SURGERY [facility];  Service: Orthopedics    TENDON TRANSFER Right 1/22/2020    Procedure: RIGHT DISTAL ULNA RESECTION AND EXTENSOR TENDON TRANSFER;  Surgeon: Marine Rushing M.D.;  Location: SURGERY  [facility];  Service: Orthopedics    OTHER ORTHOPEDIC SURGERY Right 2020    total right knee replacement    IRRIGATION & DEBRIDEMENT ORTHO Left 10/9/2019    Procedure: IRRIGATION AND DEBRIDEMENT, WOUND - PELVIC OSTEOMYELITIS;  Surgeon: You Olivares M.D.;  Location: SURGERY [facility];  Service: Orthopedics    OTHER SURGICAL PROCEDURE  2019    osteomelitis of pelvis cleaned    GASTRIC BYPASS LAPAROSCOPIC      Lucila en y    CHOLECYSTECTOMY      OTHER SURGICAL PROCEDURE      repair of broken penis       Social History:  Social History     Socioeconomic History    Marital status:      Spouse name: Not on file    Number of children: Not on file    Years of education: Not on file    Highest education level: Bachelor's degree (e.g., BA, AB, BS)   Occupational History    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   Tobacco Use    Smoking status: Every Day     Packs/day: .25     Types: Cigarettes, Cigars     Last attempt to quit: 2023     Years since quittin.5     Passive exposure: Current    Smokeless tobacco: Never    Tobacco comments:     occasional cigar   Vaping Use    Vaping Use: Never used   Substance and Sexual Activity    Alcohol use: No     Alcohol/week: 0.0 oz     Comment: quit  ago- past ETOH abuse    Drug use: No    Sexual activity: Yes     Partners: Female     Comment: ;    Other Topics Concern     Service No    Blood Transfusions Yes    Caffeine Concern No    Occupational Exposure No    Hobby Hazards No    Sleep Concern No    Stress Concern Yes    Weight Concern Yes    Special Diet No    Back Care No    Exercise No    Bike Helmet No    Seat Belt Yes    Self-Exams Yes   Social History Narrative    , lives in [place]     Social Determinants of Health     Financial Resource Strain: Low Risk  (2023)    Overall Financial Resource Strain (CARDIA)     Difficulty of Paying Living Expenses: Not hard at all   Food Insecurity: No Food Insecurity (2023)    Hunger  Vital Sign     Worried About Running Out of Food in the Last Year: Never true     Ran Out of Food in the Last Year: Never true   Transportation Needs: No Transportation Needs (8/21/2023)    PRAPARE - Transportation     Lack of Transportation (Medical): No     Lack of Transportation (Non-Medical): No   Physical Activity: Inactive (2/28/2023)    Exercise Vital Sign     Days of Exercise per Week: 0 days     Minutes of Exercise per Session: 0 min   Stress: Stress Concern Present (2/28/2023)    [place] [employer] - Occupational Stress Questionnaire     Feeling of Stress : To some extent   Social Connections: Socially Integrated (2/28/2023)    Social Connection and Isolation Panel [NHANES]     Frequency of Communication with Friends and Family: More than three times a week     Frequency of Social Gatherings with Friends and Family: Once a week     Attends Latter-day Services: More than 4 times per year     Active Member of Clubs or Organizations: Yes     Attends Club or Organization Meetings: More than 4 times per year     Marital Status:    Intimate Partner Violence: Not on file   Housing Stability: Low Risk  (2/28/2023)    Housing Stability Vital Sign     Unable to Pay for Housing in the Last Year: No     Number of Places Lived in the Last Year: 1     Unstable Housing in the Last Year: No       Family History:  Family History   Problem Relation Age of Onset    Hypertension Mother     Arthritis Mother     Depression Mother     Cancer Father         bladder    Schizophrenia Father     Cancer Maternal Grandmother         breast    Heart Disease Maternal Grandmother     Psychiatric Illness Other     Stroke Maternal Aunt     Other Neg Hx         no connective tissue disorders or known aortic disease       Physical Examination:   Vitals:    08/21/23 0720 08/21/23 0751 08/21/23 1017 08/21/23 1319   BP: (!) 89/49 90/45 101/53 96/56   Pulse: 79 79 81 90   Resp:    16   Temp:    36.7 °C (98 °F)    TempSrc:    Oral   SpO2:  93%  93%   Weight:       Height:           Physical Exam  Vitals reviewed.   Constitutional:       General: He is not in acute distress.     Appearance: Normal appearance. He is not ill-appearing.   HENT:      Head: Normocephalic and atraumatic.      Right Ear: External ear normal.      Left Ear: External ear normal.      Nose: Nose normal.      Mouth/Throat:      Pharynx: Oropharynx is clear.   Eyes:      General:         Right eye: No discharge.         Left eye: No discharge.      Extraocular Movements: Extraocular movements intact.      Conjunctiva/sclera: Conjunctivae normal.   Cardiovascular:      Rate and Rhythm: Normal rate and regular rhythm.   Pulmonary:      Effort: Pulmonary effort is normal. No respiratory distress.      Breath sounds: Normal breath sounds. No wheezing.   Abdominal:      General: Bowel sounds are normal. There is no distension.      Palpations: Abdomen is soft.      Tenderness: There is no abdominal tenderness.      Comments: Ventral hernia   Musculoskeletal:      Cervical back: Normal range of motion and neck supple.      Right lower leg: No edema.      Comments: L BKA   Skin:     General: Skin is warm and dry.   Neurological:      Mental Status: He is alert and oriented to person, place, and time.         Laboratory Data:  Recent Labs     08/19/23  0007 08/21/23  0522   WBC 9.2 9.4   RBC 4.11* 4.03*   HEMOGLOBIN 11.5* 11.3*   HEMATOCRIT 36.2* 35.1*   MCV 88.1 87.1   MCH 28.0 28.0   MCHC 31.8* 32.2*   RDW 49.7 48.6   PLATELETCT 234 208   MPV 9.9 9.6     Recent Labs     08/19/23  0007 08/21/23  0522   SODIUM 133* 132*   POTASSIUM 4.6 4.2   CHLORIDE 91* 95*   CO2 33 31   GLUCOSE 83 80   BUN 24* 23*   CREATININE 1.02 1.05   CALCIUM 8.5 8.3*       Imaging:  No orders to display       Impressions/Recommendations:  Rheumatoid arthritis (HCC)  Chronically on Humira  Rheumatology F/U    Chronic pain syndrome  On Morphine  Management per Physiatry    Vitamin D  deficiency  Vit D level 21  On supplementation    History of atrial fibrillation ( single episode 2019)  S/P Cardioversion in 2019  Consider ASA    History of gastric bypass  Monitor electrolytes    Cardiac volume overload  Echo EF 55%, RVSP 50 mmHg, mod MR/AS/AR  S/P IV Lasix then changed to PO Torsemide prior to Rehab admission  Will decrease diuretics for hypotension  Also on Farxiga    S/P BKA (below knee amputation) unilateral, left (HCC)  Ambulates w/ prosthesis    Full Code    Discussed with Dr. Pisano.  Thank you for the opportunity to assist in this patient's care.  We will continue to follow along with you.

## 2023-08-21 NOTE — PROGRESS NOTES
NURSING DAILY NOTE    Name: Richard Hubbard II   Date of Admission: 8/20/2023   Admitting Diagnosis: Acute diastolic heart failure (HCC)  Attending Physician: Keaton Pisano M.d.  Allergies: Nsaids, Diphenhydramine, Mirtazapine, Penicillins, and Promethazine    Safety  Patient Assist  Mod Assist  Patient Precautions  Fall Risk  Precaution Comments  low BP, prothesis LLE  Bed Transfer Status  Moderate Assist  Toilet Transfer Status   Maximal Assist  Assistive Devices  Rails, Wheelchair  Oxygen  None - Room Air  Diet/Therapeutic Dining  Current Diet Order   Procedures    Diet Order Diet: Level 7 - Easy to Chew (advance as tolerated); Liquid level: Level 0 - Thin     Pill Administration  whole  Agitated Behavioral Scale     ABS Level of Severity       Fall Risk  Has the patient had a fall this admission?   No  Osiris Healy Fall Risk Scoring  15, HIGH RISK  Fall Risk Safety Measures  bed alarm and chair alarm    Vitals  Temperature: 36.7 °C (98 °F)  Temp src: Oral  Pulse: 90  Respiration: 16  Blood Pressure: 96/56  Blood Pressure MAP (Calculated): 69 MM HG  BP Location: Right, Upper Arm  Patient BP Position: Supine     Oxygen  Pulse Oximetry: 93 %  O2 (LPM): 1.5  FiO2%: 21 %  O2 Delivery Device: None - Room Air    Bowel and Bladder  Last Bowel Movement  08/21/23  Stool Type  Type 5: Soft blob with clear cut edges (passed easily)  Bowel Device  Bathroom  Continent  Bladder: Continent void (Rivera catheter)   Bowel: Continent movement  Bladder Function  Urine Color: Yellow  Genitourinary Assessment   Bladder Assessment (WDL):  WDL Except  Rivera Catheter: Present with Active Order  Rivera Reasons per MD Order: Acute urinary retention or bladder outlet obstruction  Urinary Elimination: Catheter (Document on LDA)  Urine Color: Yellow  Bladder Device: Indwelling Catheter    Skin  Nam Score   18  Sensory Interventions      Moisture  Interventions  Moisturizers/Barriers: Barrier Paste, Barrier Wipes      Pain  Pain Rating Scale  8 - Awful, hard to do anything  Pain Location  Generalized  Pain Location Orientation  Right, Left, Upper  Pain Interventions   Medication (see MAR)    ADLs    Bathing      Linen Change      Personal Hygiene     Chlorhexidine Bath      Oral Care     Teeth/Dentures  Broken Teeth (Comments)  Shave     Nutrition Percentage Eaten     Environmental Precautions  Treaded Slipper Socks on Patient, Bed in Low Position  Patient Turns/Positioning  Patient Turns Self from Side to Side  Patient Turns Assistance/Tolerance     Bed Positions  Bed Controls On, Bed Locked  Head of Bed Elevated  Self regulated      Psychosocial/Neurologic Assessment  Psychosocial Assessment  Psychosocial (WDL):  WDL Except  Patient Behaviors: Fatigue  Neurologic Assessment  Neuro (WDL): Within Defined Limits  Level of Consciousness: Alert  Orientation Level: Oriented X4  Cognition: Follows commands  Speech: Clear  Pupil Assesment: Yes  R Pupil Size (mm): 2  R Pupil Shape / Description: Round  R Pupil Reaction: Brisk  L Pupil Size (mm): 2  L Pupil Shape / Description: Round  L Pupil Reaction: Brisk  EENT (WDL):  WDL Except    Cardio/Pulmonary Assessment  Edema      Respiratory Breath Sounds  RUL Breath Sounds: Clear  RML Breath Sounds: Clear, Diminished  RLL Breath Sounds: Clear, Diminished  DONALD Breath Sounds: Clear  LLL Breath Sounds: Clear, Diminished  Cardiac Assessment   Cardiac (WDL):  WDL Except

## 2023-08-21 NOTE — DISCHARGE PLANNING
CASE MANAGEMENT INITIAL ASSESSMENT    Admit Date:  8/20/2023     I met with patient to discuss role of case management / discharge planning / team conference.   Patient is a  48 y.o. male transferred from [facility].    Diagnosis: CHF (congestive heart failure), NYHA class I, acute on chronic, combined ([facility]) [I50.43]    Co-morbidities:   Patient Active Problem List    Diagnosis Date Noted    CHF (congestive heart failure), NYHA class I, acute on chronic, combined ([facility]) 08/20/2023    Hypokalemia 08/14/2023    Acute diastolic heart failure ([facility]) 08/12/2023    Cardiorenal syndrome 08/12/2023    Elevated liver function tests 08/12/2023    Mitral valve mass 08/12/2023    Left bundle branch block 08/12/2023    KATERYNA (acute kidney injury) ([facility]) 08/12/2023    Obesity (BMI 30-39.9) 05/31/2023    Umbilical hernia without obstruction and without gangrene 05/31/2023    Normocytic anemia 05/31/2023    S/P BKA (below knee amputation) unilateral, left ([facility]) 03/21/2023    Vitamin D deficiency 03/14/2023    Tenosynovitis of left lower leg 03/06/2023    Hyponatremia 02/28/2023    Generalized weakness 06/15/2022    Impaired mobility and ADLs 06/15/2022    Ulcer of right foot ([facility]) 06/15/2022    Chronic use of opiate drug for therapeutic purpose 01/18/2021    Chronic pain syndrome 01/18/2021    Localized osteoporosis without current pathological fracture 10/19/2020    History of osteomyelitis 08/14/2020    History of immunosuppressive therapy 08/14/2020    History of atrial fibrillation ( single episode 2019) 07/23/2020    History of cigarette smoking 07/21/2020    Aortic root dilatation ([facility]) 09/13/2019    Aortic stenosis, moderate 09/12/2019    Rheumatoid arthritis ([facility]) 09/08/2015    Chronic pain of right knee 09/08/2015    History of bleeding ulcers 02/12/2015    MONICA (obstructive sleep apnea) 03/09/2010    Mood disorder ([facility]) 05/21/2009    ELISEO (generalized anxiety disorder) 05/21/2009     Prior Living Situation:  Housing / Facility: [address]  Lives with - Patient's Self Care Capacity: Spouse, Child Less than 18 Years of Age (5 and 6 yo)    Prior Level of Function:  Medication Management: Independent  Finances: Independent  Home Management: Independent  Shopping: Independent  Prior Level Of Mobility: Independent With Device in Community, Independent With Device in Home  Driving / Transportation: Driving Independent    Support Systems:  Primary : Katiana-wife: [phone]      Previous Services Utilized:   Equipment Owned: Wheelchair, Front-Wheel Walker, Tub Transfer Bench, Bed Side Commode, Ramp  Prior Services: Home-Independent    Other Information:        Primary Payor Source: [employer]  Primary Care Practitioner : Agusto Dover MD    Patient / Family Goal:  Patient / Family Goal: Return home    Plan:  1. Continue to follow patient through hospitalization and provide discharge planning in collaboration with patient, family, physicians and ancillary services.     2. Utilize community resources to ensure a safe discharge.

## 2023-08-21 NOTE — PROGRESS NOTES
Received bedside shift report from Karli THRASHER RN regarding patient and assumed care. Patient awake, calm and stable, currently positioned in bed for comfort and safety; call light within reach. Denies pain or discomfort at this time. Will continue to monitor.

## 2023-08-21 NOTE — ASSESSMENT & PLAN NOTE
Echo EF 55%, RVSP 50 mmHg, mod MR/AS/AR  S/P IV Lasix then changed to PO Torsemide prior to Rehab admission  Torsemide discontinued last week for hypotension  Also on Farxiga  CXR now shows worsening pulm edema  Will resume diuretics, trial low dose Lasix

## 2023-08-22 PROBLEM — I50.33 ACUTE ON CHRONIC HEART FAILURE WITH PRESERVED EJECTION FRACTION (HFPEF) (HCC): Status: ACTIVE | Noted: 2023-01-01

## 2023-08-22 NOTE — PROGRESS NOTES
Received bedside shift report from Ghislaine THRASHER RN regarding patient and assumed care. Patient awake, calm and stable, currently positioned in bed for comfort and safety; call light within reach. Denies pain or discomfort at this time. Will continue to monitor.

## 2023-08-22 NOTE — THERAPY
Occupational Therapy  Daily Treatment     Patient Name: Richard Hubbard II  Age:  48 y.o., Sex:  male  Medical Record #: [id number]  Today's Date: 8/22/2023     Precautions  Precautions: (P) Fall Risk  Comments: (P) low BP, prothesis LLE, piedra         Subjective    Pt pleasant and agreeable to OT session.      Objective       08/22/23 0831   OT Charge Group   OT Self Care / ADL (Units) 5   OT Therapeutic Exercise (Units) 1   OT Total Time Spent   OT Individual Total Time Spent (Mins) 90   Precautions   Precautions Fall Risk   Comments low BP, prothesis LLE, piedra   Cognition    Level of Consciousness Alert   Functional Level of Assist   Eating Independent   Grooming Modified Independent;Seated   Bathing Standby Assist   Bathing Description Grab bar;Hand held shower;Tub bench;Increased time;Set-up of equipment;Supervision for safety   Upper Body Dressing Supervision   Upper Body Dressing Description Set-up of equipment   Lower Body Dressing Contact Guard Assist   Lower Body Dressing Description Grab bar;Increased time;Set-up of equipment;Supervision for safety;Verbal cueing  (CGA for balance when standing to pull up shorts; pt able to manage piedra and thread LEs into shorts, don/doff R sock/shoe.)   Bed, Chair, Wheelchair Transfer Minimal Assist   Bed Chair Wheelchair Transfer Description Increased time;Set-up of equipment;Squat pivot transfer to wheelchair;Supervision for safety;Verbal cueing   Tub / Shower Transfers Minimal Assist   Tub Shower Transfer Description Grab bar;Shower bench;Increased time;Set-up of equipment;Supervision for safety;Verbal cueing  (w/c)   Sitting Upper Body Exercises   Bilateral Row 3 sets of 15;Bilateral;Weight (See Comments for lbs)  (30# on Equalizer)   Tricep Press 3 sets of 15;Bilateral;Weight (See Comments for lbs)  (25# on Rickshaw facing fwd.)   Bed Mobility    Supine to Sit Standby Assist   Sit to Supine Standby Assist   Scooting Standby Assist   Rolling Supervised    Interdisciplinary Plan of Care Collaboration   Patient Position at End of Therapy In Bed;Bed Alarm On;Call Light within Reach;Tray Table within Reach;Phone within Reach         Assessment    Pt tolerated OT session well and demonstrates increased independence w/ ADLs and functional txfrs from OT evaluation yesterday. He con't to present with pain and fatigue, requires cues for energy conservation, pacing and breathing technique.   Strengths: Able to follow instructions, Alert and oriented, Effective communication skills, Independent prior level of function, Pleasant and cooperative, Supportive family, Willingly participates in therapeutic activities  Barriers: Decreased endurance, Fatigue, Generalized weakness, Impaired activity tolerance, Impaired balance    Plan    ADL's, IADL's at w/c level, standing bal/tolerance, strengthening/endurance, functional mobility      Passport items to be completed:  Perform bathroom transfers, complete dressing, complete feeding, get ready for the day, prepare a simple meal, participate in household tasks, adapt home for safety needs, demonstrate home exercise program, complete caregiver training     Occupational Therapy Goals (Active)       Problem: Dressing       Dates: Start:  08/21/23         Goal: STG-Within one week, patient will dress UB with set-up assist        Dates: Start:  08/21/23            Goal: STG-Within one week, patient will dress LB with mod a        Dates: Start:  08/21/23               Problem: Functional Transfers       Dates: Start:  08/21/23         Goal: STG-Within one week, patient will transfer to toilet with mod a        Dates: Start:  08/21/23               Problem: OT Long Term Goals       Dates: Start:  08/21/23         Goal: LTG-By discharge, patient will complete basic self care tasks with mod I-SPV       Dates: Start:  08/21/23            Goal: LTG-By discharge, patient will perform bathroom transfers with mod I-SPV       Dates: Start:  08/21/23             Goal: LTG-By discharge, patient will complete basic home management with mod I-SPV from w/c level        Dates: Start:  08/21/23               Problem: Toileting       Dates: Start:  08/21/23         Goal: STG-Within one week, patient will complete toileting tasks with mod a        Dates: Start:  08/21/23

## 2023-08-22 NOTE — PROGRESS NOTES
[facility] Medicine Daily Progress Note    Date of Service  8/22/2023      Chief Complaint:  CHF with vol overload    Interval History:  No complaints.  Doing ok.    Review of Systems  Review of Systems   Constitutional:  Negative for chills and fever.   Respiratory:  Negative for shortness of breath.    Cardiovascular:  Negative for chest pain.   Gastrointestinal:  Negative for abdominal pain, diarrhea, nausea and vomiting.   Psychiatric/Behavioral:  The patient is not nervous/anxious.         Physical Exam  Temp:  [36.4 °C (97.5 °F)-36.7 °C (98 °F)] 36.4 °C (97.5 °F)  Pulse:  [66-90] 66  Resp:  [16-18] 18  BP: ()/(45-56) 92/50  SpO2:  [93 %-97 %] 97 %    Physical Exam  Vitals and nursing note reviewed.   Constitutional:       Appearance: Normal appearance.   HENT:      Head: Atraumatic.   Eyes:      Conjunctiva/sclera: Conjunctivae normal.      Pupils: Pupils are equal, round, and reactive to light.   Cardiovascular:      Rate and Rhythm: Normal rate and regular rhythm.      Heart sounds: No murmur heard.  Pulmonary:      Effort: Pulmonary effort is normal.      Breath sounds: No stridor. No wheezing or rales.   Abdominal:      General: There is no distension.      Palpations: Abdomen is soft.      Tenderness: There is no abdominal tenderness.   Musculoskeletal:      Cervical back: Normal range of motion and neck supple.      Right lower leg: No edema.      Left lower leg: No edema.      Comments: Has left BKA.   Skin:     General: Skin is warm and dry.      Findings: No rash.   Neurological:      Mental Status: He is alert and oriented to person, place, and time.   Psychiatric:         Mood and Affect: Mood normal.         Behavior: Behavior normal.         Fluids    Intake/Output Summary (Last 24 hours) at 8/22/2023 0805  Last data filed at 8/22/2023 0525  Gross per 24 hour   Intake 940 ml   Output 2100 ml   Net -1160 ml       Laboratory  Recent Labs     08/21/23  0522   WBC 9.4   RBC 4.03*   HEMOGLOBIN  11.3*   HEMATOCRIT 35.1*   MCV 87.1   MCH 28.0   MCHC 32.2*   RDW 48.6   PLATELETCT 208   MPV 9.6     Recent Labs     08/21/23  0522   SODIUM 132*   POTASSIUM 4.2   CHLORIDE 95*   CO2 31   GLUCOSE 80   BUN 23*   CREATININE 1.05   CALCIUM 8.3*                   Imaging    Assessment/Plan  Acute on chronic heart failure with preserved ejection fraction (HFpEF) ([facility])  Assessment & Plan  Echo: EF 55%, RVSP 50. mod MR/AS/AR  BNP: 25,906 (8/12) --> 9686 (8/16)  S/P IV Lasix at [facility]  Cont Farxiga  Cont Demadex  Cont K+ supplements  K+: 4.2 (8/21)  Cont to monitor    S/P BKA (below knee amputation) unilateral, left (HCC)- (present on admission)  Assessment & Plan  Ambulates with prosthesis    Vitamin D deficiency- (present on admission)  Assessment & Plan  Vit D: 21  Cont supplements    Chronic pain syndrome- (present on admission)  Assessment & Plan  Management per Physiatry    History of atrial fibrillation ( single episode 2019)- (present on admission)  Assessment & Plan  HR ok  S/P Cardioversion in 2019  Cont Demadex  Note: has hx of bleeding ulcer  Cont to monitor    Rheumatoid arthritis ([facility])- (present on admission)  Assessment & Plan  Chronically on Humira

## 2023-08-22 NOTE — PROGRESS NOTES
"  Physical Medicine & Rehabilitation Progress Note    Encounter Date: 8/22/2023    Chief Complaint: Decreased mobility, weakness    Interval Events (Subjective):  Patient sitting up in room. He reports therapy is going well. Denies NVD. Denies SOB.    Objective:  VITAL SIGNS: BP 92/44   Pulse 66   Temp 36.4 °C (97.5 °F) (Oral)   Resp 15   Ht 1.803 m (5' 11\")   Wt 81.9 kg (180 lb 8 oz)   SpO2 95%   BMI 25.17 kg/m²   Gen: NAD  Psych: Mood and affect appropriate  CV: RRR, 0 edema  Resp: CTAB, no upper airway sounds  Abd: NTND  Neuro: AOx4, following commands  Unchanged from 8/21/23    Laboratory Values:  Recent Results (from the past 72 hour(s))   CBC with Differential    Collection Time: 08/21/23  5:22 AM   Result Value Ref Range    WBC 9.4 4.8 - 10.8 K/uL    RBC 4.03 (L) 4.70 - 6.10 M/uL    Hemoglobin 11.3 (L) 14.0 - 18.0 g/dL    Hematocrit 35.1 (L) 42.0 - 52.0 %    MCV 87.1 81.4 - 97.8 fL    MCH 28.0 27.0 - 33.0 pg    MCHC 32.2 (L) 32.3 - 36.5 g/dL    RDW 48.6 35.9 - 50.0 fL    Platelet Count 208 164 - 446 K/uL    MPV 9.6 9.0 - 12.9 fL    Neutrophils-Polys 51.60 44.00 - 72.00 %    Lymphocytes 34.90 22.00 - 41.00 %    Monocytes 10.30 0.00 - 13.40 %    Eosinophils 1.70 0.00 - 6.90 %    Basophils 0.30 0.00 - 1.80 %    Immature Granulocytes 1.20 (H) 0.00 - 0.90 %    Nucleated RBC 0.00 0.00 - 0.20 /100 WBC    Neutrophils (Absolute) 4.88 1.82 - 7.42 K/uL    Lymphs (Absolute) 3.29 1.00 - 4.80 K/uL    Monos (Absolute) 0.97 (H) 0.00 - 0.85 K/uL    Eos (Absolute) 0.16 0.00 - 0.51 K/uL    Baso (Absolute) 0.03 0.00 - 0.12 K/uL    Immature Granulocytes (abs) 0.11 0.00 - 0.11 K/uL    NRBC (Absolute) 0.00 K/uL   Comp Metabolic Panel (CMP)    Collection Time: 08/21/23  5:22 AM   Result Value Ref Range    Sodium 132 (L) 135 - 145 mmol/L    Potassium 4.2 3.6 - 5.5 mmol/L    Chloride 95 (L) 96 - 112 mmol/L    Co2 31 20 - 33 mmol/L    Anion Gap 6.0 (L) 7.0 - 16.0    Glucose 80 65 - 99 mg/dL    Bun 23 (H) 8 - 22 mg/dL    " Creatinine 1.05 0.50 - 1.40 mg/dL    Calcium 8.3 (L) 8.5 - 10.5 mg/dL    Correct Calcium 9.3 8.5 - 10.5 mg/dL    AST(SGOT) 26 12 - 45 U/L    ALT(SGPT) 61 (H) 2 - 50 U/L    Alkaline Phosphatase 127 (H) 30 - 99 U/L    Total Bilirubin 0.5 0.1 - 1.5 mg/dL    Albumin 2.7 (L) 3.2 - 4.9 g/dL    Total Protein 6.3 6.0 - 8.2 g/dL    Globulin 3.6 (H) 1.9 - 3.5 g/dL    A-G Ratio 0.8 g/dL   HEMOGLOBIN A1C    Collection Time: 08/21/23  5:22 AM   Result Value Ref Range    Glycohemoglobin 4.8 4.0 - 5.6 %    Est Avg Glucose 91 mg/dL   Vitamin D, 25-hydroxy (blood)    Collection Time: 08/21/23  5:22 AM   Result Value Ref Range    25-Hydroxy   Vitamin D 25 21 (L) 30 - 100 ng/mL   Magnesium    Collection Time: 08/21/23  5:22 AM   Result Value Ref Range    Magnesium 2.1 1.5 - 2.5 mg/dL   Phosphorus    Collection Time: 08/21/23  5:22 AM   Result Value Ref Range    Phosphorus 4.1 2.5 - 4.5 mg/dL   ESTIMATED GFR    Collection Time: 08/21/23  5:22 AM   Result Value Ref Range    GFR (CKD-EPI) 88 >60 mL/min/1.73 m 2       Medications:  Scheduled Medications   Medication Dose Frequency    vitamin D3  1,000 Units DAILY    torsemide  20 mg Q DAY    Pharmacy Consult Request  1 Each PHARMACY TO DOSE    senna-docusate  2 Tablet BID    omeprazole  20 mg DAILY    heparin  5,000 Units Q8HRS    dapagliflozin propanediol  10 mg DAILY    DULoxetine  60 mg DAILY    morphine  30 mg 4X/DAY    morphine ER  15 mg BID    potassium chloride SA  10 mEq DAILY    melatonin  3 mg QHS     PRN medications: Respiratory Therapy Consult, senna-docusate **AND** polyethylene glycol/lytes **AND** magnesium hydroxide **AND** bisacodyl, mag hydrox-al hydrox-simeth, sodium chloride, acetaminophen, naloxone    Diet:  Current Diet Order   Procedures    Diet Order Diet: Level 7 - Easy to Chew (advance as tolerated); Liquid level: Level 0 - Thin       Medical Decision Making and Plan:  CHF exacerbation  - Originally presented with shortness of breath and pulmonary edema as well  as cardiorenal syndrome  -While at [facility], patient was on IV Lasix for diuresis  - Cardiology was consulted at [facility] for patient's known aortic and mitral regurgitation, EF 55%  - Patient has been transferred on torsemide 40 mg daily, discussed new medication with [facility] hospitalist Dr. Wynne.  - Consult hospitalist  - Continue farxiga   -PT and OT for mobility and ADLs. Per guidelines, 15 hours per week between PT, OT and/or SLP.  -Follow-up with cardiology     Hypokalemia  - Transferred on potassium 10 mg daily  - Follow-up admission labs in the morning     Anxiety depression  - Continue Cymbalta     Insomnia  - Avoiding trazodone, patient had prolonged QTc from prior EKG  - New EKG ordered to evaluate QTc  - Replace standing as needed trazodone for melatonin     Rheumatoid arthritis  - Is on Humira q. 2 weeks  - Last dose 8/16  -Has previously been on steroids, has not been transferred to [facility] rehab on steroids, may consider restarting if patient has an RA flare     History of left BKA  - Previously at rehab in March/April 2023  - Monitor for edema control and residual limb for proper fit with prosthesis, wife will be bringing left BKA prosthesis     Chronic pain  - Chronically followed by pain management  - Is on home dose morphine 30 mg 4 times a day as well as MS Contin 15 mg twice daily  - Do not anticipate patient will need increased as needed pain medications  - Naloxone as needed narcotics overdose     Vitamin D deficiency  21 on admission.  - Start 1000 U     Skin - Patient at risk for skin breakdown due to debility in areas including sacrum, achilles, elbows and head in addition to other sites. Nursing to assess skin daily.      GI Ppx - Patient on Prilosec for GERD prophylaxis. Patient on Senna-docusate for constipation prophylaxis.      DVT Ppx - heparin on transfer. Continue Heparin  ____________________________________    T. Loi Pisano MD/PhD  [facility] - Physical Medicine &  Rehabilitation   [facility] - Brain Injury Medicine   ____________________________________

## 2023-08-22 NOTE — THERAPY
Physical Therapy   Daily Treatment     Patient Name: Richard Hubbard II  Age:  48 y.o., Sex:  male  Medical Record #: [id number]  Today's Date: 8/22/2023     Precautions  Precautions: Fall Risk  Comments: low BP, prothesis LLE, piedra    Subjective    Pt resting in bed, willing to participate     Objective       08/22/23 1031   PT Charge Group   PT Prosthetic Training - Initial (Units) 1   PT Gait Training (Units) 1   PT Therapeutic Exercise (Units) 2   PT Total Time Spent   PT Individual Total Time Spent (Mins) 60   Gait Functional Level of Assist    Gait Level Of Assist Minimal Assist   Assistive Device Front Wheel Walker  (L BKA prosthesis)   Distance (Feet) 20  (in addition to 10 ft x 3)   # of Times Distance was Traveled 1   Deviation Bradykinetic   Transfer Functional Level of Assist   Bed, Chair, Wheelchair Transfer Moderate Assist   Bed Chair Wheelchair Transfer Description Adaptive equipment;Increased time;Requires lift   Supine Lower Body Exercise   Bridges Two Legged;2 sets of 10  (completed in BKA position with LLE on half bolster)   Hip Adduction  2 sets of 10  (JONATHAN towel squeeze)   Gluteal Isometrics 2 sets of 10   Other Exercises prone lying hip ext 2 x 10 LLE   Bed Mobility    Supine to Sit Supervised   Sit to Supine Supervised   Sit to Stand Moderate Assist     Consult with prosthetist due to large and ill-fitting prosthesis, poor wear tolerance 2* pain and minimal distribution of wt bearing along residual limb, pt requires 28 ply sock application for improved fit and surface contact with socket, discussed probability of needing new socket fabrication, prosthetist in agreement.     Assessment    Pt remains with low activity tolerance and requires mod A for sit>stand transition for low wc or when fatigued. Improved standing/ wt acceptance and gait tolerance with increased sock ply during PT, tolerated ~ 20 minutes wear time this am.     Strengths: Able to follow instructions, Independent prior  level of function, Motivated for self care and independence, Pleasant and cooperative, Supportive family, Willingly participates in therapeutic activities  Barriers: Decreased endurance, Fatigue, Generalized weakness, Hypotension, Impaired balance, Impaired activity tolerance, Limited mobility (ill fitting prosthesis, poly-joint RA)    Plan    Wc endurance, sit<>stand and progressive gait training with FWW and L prosthesis, supine/ sidelying and prone mat program.     Passport items to be completed:  Get in/out of bed safely, in/out of a vehicle, safely use mobility device, walk or wheel around home/community, navigate up and down stairs, show how to get up/down from the ground, ensure home is accessible, demonstrate HEP, complete caregiver training      Physical Therapy Problems (Active)       Problem: Balance       Dates: Start:  08/21/23         Goal: STG-Within one week, patient will maintain static standing 2 minutes x 3 with  UE support at // bars for wt shifting/ prosthesis education and limb desensitization       Dates: Start:  08/21/23               Problem: Mobility       Dates: Start:  08/21/23         Goal: STG-Within one week, patient will propel wheelchair household distances modified independent 50 ft x 2       Dates: Start:  08/21/23            Goal: STG-Within one week, patient will ambulate household distance min A with FWW x 25 ft       Dates: Start:  08/21/23               Problem: Mobility Transfers       Dates: Start:  08/21/23         Goal: STG-Within one week, patient will perform bed mobility modified independent       Dates: Start:  08/21/23            Goal: STG-Within one week, patient will transfer bed to chair CGA for stand pivot without prosthesis vs stand-step with FWW and prosthesis       Dates: Start:  08/21/23               Problem: PT-Long Term Goals       Dates: Start:  08/21/23         Goal: LTG-By discharge, patient will tolerate standing 5 minutes x 2 for wt shifting/ balance  training and prosthesis training/ tolerance       Dates: Start:  08/21/23            Goal: LTG-By discharge, patient will propel wheelchair modified independent 50 ft x 2       Dates: Start:  08/21/23            Goal: LTG-By discharge, patient will ambulate SBA with FWW/ L prosthesis x 50 ft       Dates: Start:  08/21/23            Goal: LTG-By discharge, patient will transfer one surface to another modified independent with or without prosthesis       Dates: Start:  08/21/23            Goal: LTG-By discharge, patient will transfer in/out of a car modified independent with or without prosthesis       Dates: Start:  08/21/23

## 2023-08-22 NOTE — PROGRESS NOTES
NURSING DAILY NOTE    Name: Richard Hubbard II   Date of Admission: 8/20/2023   Admitting Diagnosis: Acute diastolic heart failure (HCC)  Attending Physician: Keaton Pisano M.d.  Allergies: Nsaids, Diphenhydramine, Mirtazapine, Penicillins, and Promethazine    Safety  Patient Assist  Mon Assist  Patient Precautions  Fall Risk  Precaution Comments  low BP, prothesis LLE  Bed Transfer Status  Moderate Assist  Toilet Transfer Status   Maximal Assist  Assistive Devices  Rails, Wheelchair  Oxygen  None - Room Air  Diet/Therapeutic Dining  Current Diet Order   Procedures    Diet Order Diet: Level 7 - Easy to Chew (advance as tolerated); Liquid level: Level 0 - Thin     Pill Administration  whole  Agitated Behavioral Scale     ABS Level of Severity       Fall Risk  Has the patient had a fall this admission?   No  Osiris Healy Fall Risk Scoring  15, HIGH RISK  Fall Risk Safety Measures  bed alarm and bed strip alarm    Vitals  Temperature: 36.4 °C (97.5 °F)  Temp src: Oral  Pulse: 66  Respiration: 18  Blood Pressure: 92/50  Blood Pressure MAP (Calculated): 64 MM HG  BP Location: Right, Upper Arm  Patient BP Position: Supine     Oxygen  Pulse Oximetry: 97 %  O2 (LPM): 0  FiO2%: 21 %  O2 Delivery Device: None - Room Air    Bowel and Bladder  Last Bowel Movement  08/21/23  Stool Type  Type 5: Soft blob with clear cut edges (passed easily)  Bowel Device  Bathroom, Other (Comment) (Bowel Meds)  Continent  Bladder: Continent void (Rivera catheter)   Bowel: Continent movement  Bladder Function  Urine Color: Yellow  Genitourinary Assessment   Bladder Assessment (WDL):  WDL Except  Rivera Catheter: Present with Active Order  Rivera Reasons per MD Order: Acute urinary retention or bladder outlet obstruction  Urinary Elimination: Catheter (Document on LDA)  Urine Color: Yellow  Bladder Device: Indwelling Catheter    Skin  Nam Score   18  Sensory Interventions       Moisture Interventions  Moisturizers/Barriers: Barrier Paste, Barrier Wipes      Pain  Pain Rating Scale  5 - Interrupts some activities  Pain Location  Generalized  Pain Location Orientation  Right, Left, Upper  Pain Interventions   Rest    ADLs    Bathing      Linen Change      Personal Hygiene     Chlorhexidine Bath      Oral Care     Teeth/Dentures  Broken Teeth (Comments)  Shave     Nutrition Percentage Eaten  *  * Meal *  *, Dinner, Between % Consumed  Environmental Precautions  Treaded Slipper Socks on Patient, Bed in Low Position  Patient Turns/Positioning  Patient Turns Self from Side to Side  Patient Turns Assistance/Tolerance     Bed Positions  Bed Controls On, Bed Locked  Head of Bed Elevated  Self regulated      Psychosocial/Neurologic Assessment  Psychosocial Assessment  Psychosocial (WDL):  WDL Except  Patient Behaviors: Fatigue  Neurologic Assessment  Neuro (WDL): Within Defined Limits  Level of Consciousness: Alert  Orientation Level: Oriented X4  Cognition: Follows commands  Speech: Clear  Pupil Assesment: Yes  R Pupil Size (mm): 2  R Pupil Shape / Description: Round  R Pupil Reaction: Brisk  L Pupil Size (mm): 2  L Pupil Shape / Description: Round  L Pupil Reaction: Brisk  EENT (WDL):  WDL Except    Cardio/Pulmonary Assessment  Edema      Respiratory Breath Sounds  RUL Breath Sounds: Clear  RML Breath Sounds: Clear, Diminished  RLL Breath Sounds: Clear, Diminished  DONALD Breath Sounds: Clear  LLL Breath Sounds: Clear, Diminished  Cardiac Assessment   Cardiac (WDL):  WDL Except (H/O HTN, CHF.)

## 2023-08-22 NOTE — CARE PLAN
"The patient is Watcher - Medium risk of patient condition declining or worsening    Shift Goals  Clinical Goals: Safety  Patient Goals: Safety, pain management      Problem: Pain - Standard  Goal: Alleviation of pain or a reduction in pain to the patient’s comfort goal  Outcome: Progressing    Patient c/o 7/10 pain.  Scheduled medication given per MAR.     Problem: Fall Risk - Rehab  Goal: Patient will remain free from falls  Outcome: Progressing     Osiris Healy Fall risk Assessment Score: 15    High fall risk Interventions   - Alarming seatbelt  - Wander guard  - Bed and strip alarm   - Yellow sign by the door   - Yellow wrist band \"Fall risk\"  - Room near to the nurse station  - Do not leave patient unattended in the bathroom  - Fall risk education provided      "

## 2023-08-23 NOTE — PROGRESS NOTES
"  Physical Medicine & Rehabilitation Progress Note    Encounter Date: 8/23/2023    Chief Complaint: Decreased mobility, weakness    Interval Events (Subjective):  Patient sitting up in room. He reports pain to leg. Will restart Pregabalin which he was on in the past. Discussed about checking XR of right knee as that is most painful. He reports if it does not improve may need steroid taper. Denies nVD.     Objective:  VITAL SIGNS: /56   Pulse 79   Temp 36.9 °C (98.4 °F) (Oral)   Resp 18   Ht 1.803 m (5' 11\")   Wt 85 kg (187 lb 6.3 oz)   SpO2 98%   BMI 26.14 kg/m²   Gen: NAD  Psych: Mood and affect appropriate  CV: RRR, 0 edema  Resp: CTAB, no upper airway sounds  Abd: NTND  Neuro: AOx4, following commands  MSK: Normal ROM R Knee    Laboratory Values:  Recent Results (from the past 72 hour(s))   CBC with Differential    Collection Time: 08/21/23  5:22 AM   Result Value Ref Range    WBC 9.4 4.8 - 10.8 K/uL    RBC 4.03 (L) 4.70 - 6.10 M/uL    Hemoglobin 11.3 (L) 14.0 - 18.0 g/dL    Hematocrit 35.1 (L) 42.0 - 52.0 %    MCV 87.1 81.4 - 97.8 fL    MCH 28.0 27.0 - 33.0 pg    MCHC 32.2 (L) 32.3 - 36.5 g/dL    RDW 48.6 35.9 - 50.0 fL    Platelet Count 208 164 - 446 K/uL    MPV 9.6 9.0 - 12.9 fL    Neutrophils-Polys 51.60 44.00 - 72.00 %    Lymphocytes 34.90 22.00 - 41.00 %    Monocytes 10.30 0.00 - 13.40 %    Eosinophils 1.70 0.00 - 6.90 %    Basophils 0.30 0.00 - 1.80 %    Immature Granulocytes 1.20 (H) 0.00 - 0.90 %    Nucleated RBC 0.00 0.00 - 0.20 /100 WBC    Neutrophils (Absolute) 4.88 1.82 - 7.42 K/uL    Lymphs (Absolute) 3.29 1.00 - 4.80 K/uL    Monos (Absolute) 0.97 (H) 0.00 - 0.85 K/uL    Eos (Absolute) 0.16 0.00 - 0.51 K/uL    Baso (Absolute) 0.03 0.00 - 0.12 K/uL    Immature Granulocytes (abs) 0.11 0.00 - 0.11 K/uL    NRBC (Absolute) 0.00 K/uL   Comp Metabolic Panel (CMP)    Collection Time: 08/21/23  5:22 AM   Result Value Ref Range    Sodium 132 (L) 135 - 145 mmol/L    Potassium 4.2 3.6 - 5.5 mmol/L "    Chloride 95 (L) 96 - 112 mmol/L    Co2 31 20 - 33 mmol/L    Anion Gap 6.0 (L) 7.0 - 16.0    Glucose 80 65 - 99 mg/dL    Bun 23 (H) 8 - 22 mg/dL    Creatinine 1.05 0.50 - 1.40 mg/dL    Calcium 8.3 (L) 8.5 - 10.5 mg/dL    Correct Calcium 9.3 8.5 - 10.5 mg/dL    AST(SGOT) 26 12 - 45 U/L    ALT(SGPT) 61 (H) 2 - 50 U/L    Alkaline Phosphatase 127 (H) 30 - 99 U/L    Total Bilirubin 0.5 0.1 - 1.5 mg/dL    Albumin 2.7 (L) 3.2 - 4.9 g/dL    Total Protein 6.3 6.0 - 8.2 g/dL    Globulin 3.6 (H) 1.9 - 3.5 g/dL    A-G Ratio 0.8 g/dL   HEMOGLOBIN A1C    Collection Time: 08/21/23  5:22 AM   Result Value Ref Range    Glycohemoglobin 4.8 4.0 - 5.6 %    Est Avg Glucose 91 mg/dL   Vitamin D, 25-hydroxy (blood)    Collection Time: 08/21/23  5:22 AM   Result Value Ref Range    25-Hydroxy   Vitamin D 25 21 (L) 30 - 100 ng/mL   Magnesium    Collection Time: 08/21/23  5:22 AM   Result Value Ref Range    Magnesium 2.1 1.5 - 2.5 mg/dL   Phosphorus    Collection Time: 08/21/23  5:22 AM   Result Value Ref Range    Phosphorus 4.1 2.5 - 4.5 mg/dL   ESTIMATED GFR    Collection Time: 08/21/23  5:22 AM   Result Value Ref Range    GFR (CKD-EPI) 88 >60 mL/min/1.73 m 2       Medications:  Scheduled Medications   Medication Dose Frequency    vitamin D3  1,000 Units DAILY    torsemide  20 mg Q DAY    Pharmacy Consult Request  1 Each PHARMACY TO DOSE    senna-docusate  2 Tablet BID    omeprazole  20 mg DAILY    heparin  5,000 Units Q8HRS    dapagliflozin propanediol  10 mg DAILY    DULoxetine  60 mg DAILY    morphine  30 mg 4X/DAY    morphine ER  15 mg BID    potassium chloride SA  10 mEq DAILY    melatonin  3 mg QHS     PRN medications: Respiratory Therapy Consult, senna-docusate **AND** polyethylene glycol/lytes **AND** magnesium hydroxide **AND** bisacodyl, mag hydrox-al hydrox-simeth, sodium chloride, acetaminophen, naloxone    Diet:  Current Diet Order   Procedures    Diet Order Diet: Regular (For 8/23/23 breakfast, pt is requesting victor, eggs  and pancakes.  Plus with all meals, he would like 2 cokes on his trays.  Thank you.)       Medical Decision Making and Plan:  CHF exacerbation  - Originally presented with shortness of breath and pulmonary edema as well as cardiorenal syndrome  -While at [facility], patient was on IV Lasix for diuresis  - Cardiology was consulted at [facility] for patient's known aortic and mitral regurgitation, EF 55%  - Patient has been transferred on torsemide 40 mg daily, discussed new medication with [facility] hospitalist Dr. Wynne.  - Consult hospitalist  - Continue farxiga   -PT and OT for mobility and ADLs. Per guidelines, 15 hours per week between PT, OT and/or SLP.  -Follow-up with cardiology     Hypokalemia  - Transferred on potassium 10 mg daily  - Follow-up admission labs in the morning     Anxiety depression  - Continue Cymbalta     Insomnia  - Avoiding trazodone, patient had prolonged QTc from prior EKG  - New EKG ordered to evaluate QTc  - Replace standing as needed trazodone for melatonin     Rheumatoid arthritis  - Is on Humira q. 2 weeks  - Last dose 8/16  -Has previously been on steroids, has not been transferred to [facility] rehab on steroids, may consider restarting if patient has an RA flare     History of left BKA  - Previously at rehab in March/April 2023  - Monitor for edema control and residual limb for proper fit with prosthesis, wife will be bringing left BKA prosthesis     Chronic pain  - Chronically followed by pain management  - Is on home dose morphine 30 mg 4 times a day as well as MS Contin 15 mg twice daily  - Do not anticipate patient will need increased as needed pain medications  - Naloxone as needed narcotics overdose. Start Lyrica 100 mg TID. Check AM labs, consider steroid taper     Vitamin D deficiency  21 on admission.  - Start 1000 U     Skin - Patient at risk for skin breakdown due to debility in areas including sacrum, achilles, elbows and head in addition to other sites. Nursing  to assess skin daily.      GI Ppx - Patient on Prilosec for GERD prophylaxis. Patient on Senna-docusate for constipation prophylaxis.      DVT Ppx - heparin on transfer. Continue Heparin  ____________________________________    T. Loi Pisano MD/PhD  [facility] - Physical Medicine & Rehabilitation   [facility] - Brain Injury Medicine   ____________________________________

## 2023-08-23 NOTE — PROGRESS NOTES
Received bedside shift report from Flori FOSS RN regarding patient and assumed care. Patient awake, calm and stable, currently positioned in bed for comfort and safety; call light within reach. Denies pain or discomfort at this time. Will continue to monitor.

## 2023-08-23 NOTE — CARE PLAN
"The patient is Watcher - Medium risk of patient condition declining or worsening    Shift Goals  Clinical Goals: Safety  Patient Goals: Sleep      Problem: Pain - Standard  Goal: Alleviation of pain or a reduction in pain to the patient’s comfort goal  Outcome: Progressing     Patient c/o 8/10 pain.  Scheduled medication given per MAR.    Problem: Fall Risk - Rehab  Goal: Patient will remain free from falls  Outcome: Progressing    Osiris Healy Fall risk Assessment Score: 15    High fall risk Interventions   - Alarming seatbelt  - Wander guard  - Bed and strip alarm   - Yellow sign by the door   - Yellow wrist band \"Fall risk\"  - Room near to the nurse station  - Do not leave patient unattended in the bathroom  - Fall risk education provided           "

## 2023-08-23 NOTE — THERAPY
"Physical Therapy   Daily Treatment     Patient Name: Richard Hubbard II  Age:  48 y.o., Sex:  male  Medical Record #: [id number]  Today's Date: 8/23/2023     Precautions  Precautions: Fall Risk  Comments: low BP, prothesis LLE, piedra    Subjective    Patient is agreeable to participate, reports pain and RN is coming with meds for morning session 0930. Patient reports better pain for 1100 session, verbalizes acute pain with R knee in coming to standing. Agreeable for 1515 session, willing to work on mat table exercises     Objective       08/23/23 0931 08/23/23 1101 08/23/23 1515   PT Charge Group   PT Gait Training (Units) 3  --   --    PT Therapeutic Exercise (Units)  --  2 3   PT Total Time Spent   PT Individual Total Time Spent (Mins) 45 30 45   Wheelchair Functional Level of Assist   Wheelchair Assist Supervised  --   --    Distance Wheelchair (Feet or Distance) 350  --   --    Wheelchair Description Extra time;Limited by fatigue;Supervision for safety;Verbal cueing  (mobilized in wheelchair outdoors over ramps, uneven ground, cement sidewalk seams. Multiple rest breaks throughout due to work of self mobilization)  --   --    Transfer Functional Level of Assist   Bed, Chair, Wheelchair Transfer  --  Contact Guard Assist  --    Bed Chair Wheelchair Transfer Description  --  Increased time;Initial preparation for task;Set-up of equipment;Supervision for safety;Verbal cueing  --    Supine Lower Body Exercise   Bridges  --   --  Two Legged;1 set of 15   Hip Abduction  --   --  1 set of 15;Bilateral   Straight Leg Raises  --   --  Front;1 set of 15;Bilateral   Short Arc Quad  --   --  2 sets of 15;Bilateral   Other Exercises  --   --  prone hamstring curls 1 x 15B, prone hip extension 1 x15 B, prone \"superman\" with B shoulder flexion/B hip extension x 15   Sitting Lower Body Exercises   Hip Flexion  --  2 sets of 15;Bilateral  --    Hip Abduction  --  2 sets of 15;Bilateral;Medium Resistance Theraband  --    Long " Arc Quad  --  2 sets of 15;Bilateral  --    Hamstring Curl  --  2 sets of 15;Right;Medium Resistance Theraband  --    Sit to Stand  --    (2 reps, painful on lateral knee just above joint line with standing. Standing activities terminated, Dr Pisano aware.)  --    Bed Mobility    Sit to Supine  --  Supervised  --    Sit to Stand  --  Minimal Assist  --    Outcome Measures   Outcome Measures Utilized 6 Minute Walk Test  (6 minute wheel test)  --   --    6 Minute Walk Test   Distance (feet) 538  --   --    Gait Speed (meters per second) 0.46  --   --    Number of Rests 2  --   --    Level of Assistance 6  --   --    Interdisciplinary Plan of Care Collaboration   IDT Collaboration with  Nursing Physician Physician   Patient Position at End of Therapy Seated;Call Light within Reach;Tray Table within Reach;Phone within Reach In Bed;Bed Alarm On;Call Light within Reach;Tray Table within Reach;Phone within Reach Seated;Call Light within Reach;Tray Table within Reach;Phone within Reach   Collaboration Comments RN provided pain meds Informed MD re: lateral knee pain in standing with history of 3-4 falls since last admission, MD to order xray to rule out periprosthetic fx MD rounded back with patient re: Lyrica         Assessment    Six minute roll test is below average for age matched BLE impaired populations (280 meters is mean). Patient was able to complete 163 meters. He is limited by pain in shoulders, kyphotic posture, limited endurance/ conditioning. Standing is limited by pain in R knee, is tender to palpation along lateral femoral condyle, MD ordered xray to rule out periprosthetic fracture. Supine exercises in afternoon were performed on mat table, patient demonstrates good carryover of education for mat table program from previous rehab stay, requires minimal cuing for sequencing of mat table exercises. He will benefit from further strength training to optimize conditioning and progress to ambulation with new BKA  prosthetic. Will request for new offloading shoe for protecting R plantar wound.    Strengths: Able to follow instructions, Independent prior level of function, Motivated for self care and independence, Pleasant and cooperative, Supportive family, Willingly participates in therapeutic activities  Barriers: Decreased endurance, Fatigue, Generalized weakness, Hypotension, Impaired balance, Impaired activity tolerance, Limited mobility (ill fitting prosthesis, poly-joint RA)    Plan    Prone/ supine/ seated/ standing therex, balance, progress to ambulation as able, offloading shoe fax [phone]    Passport items to be completed:  Get in/out of bed safely, in/out of a vehicle, safely use mobility device, walk or wheel around home/community, navigate up and down stairs, show how to get up/down from the ground, ensure home is accessible, demonstrate HEP, complete caregiver training    Physical Therapy Problems (Active)       Problem: Balance       Dates: Start:  08/21/23         Goal: STG-Within one week, patient will maintain static standing 2 minutes x 3 with  UE support at // bars for wt shifting/ prosthesis education and limb desensitization       Dates: Start:  08/21/23               Problem: Mobility       Dates: Start:  08/21/23         Goal: STG-Within one week, patient will propel wheelchair household distances modified independent 50 ft x 2       Dates: Start:  08/21/23            Goal: STG-Within one week, patient will ambulate household distance min A with FWW x 25 ft       Dates: Start:  08/21/23               Problem: Mobility Transfers       Dates: Start:  08/21/23         Goal: STG-Within one week, patient will perform bed mobility modified independent       Dates: Start:  08/21/23            Goal: STG-Within one week, patient will transfer bed to chair CGA for stand pivot without prosthesis vs stand-step with FWW and prosthesis       Dates: Start:  08/21/23               Problem: PT-Long Term Goals        Dates: Start:  08/21/23         Goal: LTG-By discharge, patient will tolerate standing 5 minutes x 2 for wt shifting/ balance training and prosthesis training/ tolerance       Dates: Start:  08/21/23            Goal: LTG-By discharge, patient will propel wheelchair modified independent 50 ft x 2       Dates: Start:  08/21/23            Goal: LTG-By discharge, patient will ambulate SBA with FWW/ L prosthesis x 50 ft       Dates: Start:  08/21/23            Goal: LTG-By discharge, patient will transfer one surface to another modified independent with or without prosthesis       Dates: Start:  08/21/23            Goal: LTG-By discharge, patient will transfer in/out of a car modified independent with or without prosthesis       Dates: Start:  08/21/23

## 2023-08-23 NOTE — WOUND TEAM
[facility]  Inpatient Services  Initial Wound and Skin Care Evaluation    Admission Date: 2023     Last order of IP CONSULT TO WOUND CARE was found on 2023 from Hospital Encounter on 2023     HPI, PMH, SH: Reviewed    Past Surgical History:   Procedure Laterality Date    KNEE AMPUTATION BELOW Left 3/9/2023    Procedure: AMPUTATION, BELOW KNEE;  Surgeon: Wayne Chambers M.D.;  Location: SURGERY [facility];  Service: Orthopedics    PB TOTAL KNEE ARTHROPLASTY Right 2020    Procedure: ARTHROPLASTY, KNEE, TOTAL;  Surgeon: Temo Pires M.D.;  Location: SURGERY [facility];  Service: Orthopedics    TENDON TRANSFER Right 2020    Procedure: RIGHT DISTAL ULNA RESECTION AND EXTENSOR TENDON TRANSFER;  Surgeon: Marine Rushing M.D.;  Location: SURGERY [facility];  Service: Orthopedics    OTHER ORTHOPEDIC SURGERY Right     total right knee replacement    IRRIGATION & DEBRIDEMENT ORTHO Left 10/9/2019    Procedure: IRRIGATION AND DEBRIDEMENT, WOUND - PELVIC OSTEOMYELITIS;  Surgeon: You Olivares M.D.;  Location: SURGERY [facility];  Service: Orthopedics    OTHER SURGICAL PROCEDURE  2019    osteomelitis of pelvis cleaned    GASTRIC BYPASS LAPAROSCOPIC      Lucila en y    CHOLECYSTECTOMY      OTHER SURGICAL PROCEDURE      repair of broken penis     Social History     Tobacco Use    Smoking status: Every Day     Current packs/day: 0.00     Types: Cigarettes, Cigars     Last attempt to quit: 2023     Years since quittin.5     Passive exposure: Current    Smokeless tobacco: Never    Tobacco comments:     occasional cigar   Substance Use Topics    Alcohol use: No     Alcohol/week: 0.0 oz     Comment: quit  ago- past ETOH abuse     No chief complaint on file.    Diagnosis: CHF (congestive heart failure), NYHA class I, acute on chronic, combined (HCC) [I50.43]    Unit where seen by Wound Team: [facility]     WOUND CONSULT RELATED TO:  right plantar  foot    WOUND TEAM PLAN OF CARE - Frequency of Follow-up:   Nursing to follow dressing orders written for wound care. Contact wound team if area fails to progress, deteriorates or with any questions/concerns if something comes up before next scheduled follow up (See below as to whether wound is following and frequency of wound follow up)   Not following, consult as needed  - right plantar foot    WOUND HISTORY:   Patient has severe RA and developed charcot foot.        WOUND ASSESSMENT/LDA  Wound 08/20/23 Neuropathic Right right plantar wound (Active)   Date First Assessed/Time First Assessed: 08/20/23 1449   Present on Original Admission: Yes  Primary Wound Type: Neuropathic  Laterality: Right  Wound Description (Comments): right plantar wound      Assessments 8/22/2023  5:00 PM   Wound Image     Site Assessment Red   Periwound Assessment Clean;Dry;Intact   Margins Defined edges   Closure Secondary intention   Drainage Amount Scant   Drainage Description Serosanguineous   Treatments CSWD - Conservative Sharp Wound Debridement;Cleansed;Site care   Wound Cleansing Normal Saline Irrigation   Periwound Protectant No-sting Skin Prep   Dressing Status Clean;Dry;Intact   Dressing Changed New   Dressing Cleansing/Solutions Not Applicable   Dressing Options Hydrofiber Silver;Silicone Adhesive Foam   Dressing Change/Treatment Frequency Every 48 hrs, and As Needed   NEXT Dressing Change/Treatment Date 08/24/23   NEXT Weekly Photo (Inpatient Only) 08/29/23   Wound Team Following Not following   Non-staged Wound Description Full thickness   Wound Length (cm) 0.2 cm   Wound Width (cm) 0.5 cm   Wound Depth (cm) 0.1 cm   Wound Surface Area (cm^2) 0.1 cm^2   Wound Volume (cm^3) 0.01 cm^3   Post-Procedure Length (cm) 0.7 cm   Post-Procedure Width (cm) 0.7 cm   Post-Procedure Depth (cm) 0.2 cm   Post-Procedure Surface Area (cm^2) 0.49 cm^2   Post-Procedure Volume (cm^3) 0.098 cm^3   Shape oval   Wound Odor None   Right Foot  Monofilament 10-point exam (Sensate) 7/10   Left Foot Monofilament 10-point exam (Sensate) 7/10   Exposed Structures None   WOUND NURSE ONLY - Time Spent with Patient (mins) 30        Vascular:    SUSHMA:   No results found.    Lab Values:    Lab Results   Component Value Date/Time    WBC 9.4 08/21/2023 05:22 AM    WBC 8.4 10/19/2010 12:05 PM    RBC 4.03 (L) 08/21/2023 05:22 AM    RBC 4.96 10/19/2010 12:05 PM    HEMOGLOBIN 11.3 (L) 08/21/2023 05:22 AM    HEMATOCRIT 35.1 (L) 08/21/2023 05:22 AM    CREACTPROT 6.85 (H) 08/15/2023 01:03 AM    SEDRATEWES 40 (H) 08/15/2023 01:03 AM    HBA1C 4.8 08/21/2023 05:22 AM         Culture Results show:  No results found for this or any previous visit (from the past 720 hour(s)).    Pain Level/Medicated:  None, Tolerated without pain medication       INTERVENTIONS BY WOUND TEAM:  Chart and images reviewed. Discussed with bedside RN. All areas of concern (based on picture review, LDA review and discussion with bedside RN) have been thoroughly assessed. Documentation of areas based on significant findings. This RN in to assess patient. Performed standard wound care which includes appropriate positioning, dressing removal and non-selective debridement. Pictures and measurements obtained weekly if/when required.    Wound:  Right plantar foot  Preparation for Dressing removal: Open to air  Cleansed/Non-selectively Debrided with:  Normal Saline and Gauze  Non-Excisional Conservative Sharp debridement: Slough, Non-Viable/Devitalized tissue, and Callus debrided away using scissors and forceps < 20cm2 debrided down to viable tissue.  Small amount of bleeding noted, controlled with manual pressure.  [place] wound: Cleansed with Normal Saline and Gauze, Prepped with No Sting  Primary Dressing:  Aquacel Ag hydrofiber  Secondary (Outer) Dressing: silicone adhesive foam     Advanced Wound Care Discharge Planning  Number of Clinicians necessary to complete wound care: 1  Is patient requiring IV pain  medications for dressing changes:  No   Length of time for dressing change 10 min. (This does not include chart review, pre-medication time, set up, clean up or time spent charting.)    Interdisciplinary consultation: Patient, Bedside RN (Flori), Jazmin MTZ (Wound RN).    EVALUATION / RATIONALE FOR TREATMENT:     Date:  08/22/23  Wound Status:  Initial evaluation    Neuropathic RA charcot foot ulcer. Aquacel Ag Hydrofiber applied to manage bioburden, absorb exudate, and maintain a moist wound environment without laterally wicking exudate therefore reducing marti-wound maceration. Patient is dialed in with Dr. Pryor and OP wound clinic.            Goals: Steady decrease in wound area and depth weekly.    NURSING PLAN OF CARE ORDERS:  Dressing changes: See Dressing Care orders    NUTRITION RECOMMENDATIONS   Wound Team Recommendations:  Protein supplements  Arginine powder    DIET ORDERS (From admission to next 24h)       Start     Ordered    08/21/23 1150  Diet Order Diet: Level 7 - Easy to Chew (advance as tolerated); Liquid level: Level 0 - Thin  ALL MEALS        Question Answer Comment   Diet: Level 7 - Easy to Chew advance as tolerated   Liquid level Level 0 - Thin        08/21/23 1149                    PREVENTATIVE INTERVENTIONS:    Q shift Nam - performed per nursing policy  Q shift pressure point assessments - performed per nursing policy    Surface/Positioning  Standard/trauma mattress - Currently in Place    Offloading/Redistribution  Float Heels off Bed with Pillows - Currently in Place           Respiratory  N/A    Anticipated discharge plans:  Outpatient Wound Center        Vac Discharge Needs:  Vac Discharge plan is purely a recommendation from wound team and not a requirement for discharge unless otherwise stated by physician.  Not Applicable Pt not on a wound vac

## 2023-08-23 NOTE — PROGRESS NOTES
BP 94/54, held Demadex per Doctor's orders of parameters regarding blood pressures.  Will continue to monitor patient.

## 2023-08-23 NOTE — DISCHARGE INSTRUCTIONS
[facility] NURSING DISCHARGE INSTRUCTIONS    Blood Pressure: 107/65  Weight: 91.5 kg (201 lb 11.5 oz)  Nursing recommendations for Richard Salinas Stevie AMATO at time of discharge are as follows:  Client verbalized understanding of all discharge instructions and prescriptions.     Review all your home medications and newly ordered medications with your doctor and/or pharmacist. Follow medication instructions as directed by your doctor and/or pharmacist.    Pain Management:   Discharge Pain Medication Instructions:  Comfort Goal: Sleep Comfortably  Notify your primary care provider if pain is unrelieved with these measures, if the pain is new, or increased in intensity.    Discharge Skin Characteristics: Warm, Dry  Discharge Skin Exam: Other (Comments)  Moisture Associated Skin Damage 08/20/23 Groin (Active)   Drainage Amount None 09/04/23 1700   Periwound Assessment Clean;Dry;Intact 09/04/23 1700   IAD Cleansing Soap and Water 09/04/23 1700   Periwound Protectant Not Applicable 09/04/23 1700   IAD Containment Device Indwelling Catheter 08/25/23 0745       Wound 08/20/23 Neuropathic Right right plantar wound (Active)   Wound Image   09/04/23 0815   Site Assessment [place] 09/04/23 0815   Periwound Assessment Clean;Dry;Intact 09/04/23 0815   Margins JI 08/30/23 2030   Closure Secondary intention 09/04/23 0815   Drainage Amount None 09/04/23 0815   Drainage Description JI 08/30/23 0825   Treatments Site care 09/04/23 0815   Wound Cleansing Normal Saline Irrigation 09/04/23 0815   Periwound Protectant Hydrofiber 08/29/23 0542   Dressing Status Removed 09/04/23 0815   Dressing Changed Changed 09/04/23 0815   Dressing Cleansing/Solutions Normal Saline 08/29/23 0542   Dressing Options Hydrofiber Silver;Silicone Adhesive Foam 09/04/23 0815   Dressing Change/Treatment Frequency Every 48 hrs, and As Needed 08/30/23 0601   NEXT Dressing Change/Treatment Date 09/01/23 08/30/23 0601   NEXT Weekly Photo  (Inpatient Only) 09/11/23 09/04/23 0815   Wound Team Following Not following 08/27/23 2224   Non-staged Wound Description Full thickness 08/22/23 1700   Wound Length (cm) 0.2 cm 08/22/23 1700   Wound Width (cm) 0.5 cm 08/22/23 1700   Wound Depth (cm) 0.1 cm 08/22/23 1700   Wound Surface Area (cm^2) 0.1 cm^2 08/22/23 1700   Wound Volume (cm^3) 0.01 cm^3 08/22/23 1700   Post-Procedure Length (cm) 0.7 cm 08/22/23 1700   Post-Procedure Width (cm) 0.7 cm 08/22/23 1700   Post-Procedure Depth (cm) 0.2 cm 08/22/23 1700   Post-Procedure Surface Area (cm^2) 0.49 cm^2 08/22/23 1700   Post-Procedure Volume (cm^3) 0.098 cm^3 08/22/23 1700   Shape oval 08/22/23 1700   Wound Odor None 08/22/23 1700   Right Foot Monofilament 10-point exam (Sensate) 7/10 08/22/23 1700   Left Foot Monofilament 10-point exam (Sensate) 7/10 08/22/23 1700   Exposed Structures None 08/22/23 1700   WOUND NURSE ONLY - Time Spent with Patient (mins) 30 08/22/23 1700     Skin / Wound Care Instructions: Please contact your primary care physician for any change in skin integrity.     If You Have Surgical Incisions / Wounds:  Monitor surgical site(s) for signs of increased swelling, redness or symptoms of drainage from the site or fever as this could indicate signs and symptoms of infection. If these symptoms are noted, notifiy your primary care provider.      Discharge Safety Instructions: No Supervision Needed     Discharge Safety Concerns: Weakness  The interdisciplinary team has made recommendation that you do not require supervision in the house due to demonstration of safety with ADL's and IADLS and problem solving skills  Anti-embolic stockings are not required to increase circulation to the lower extremities.    Discharge Diet: Regular     Discharge Liquids: Thin  Discharge Bowel Function: Continent  Please contact your primary care physician for any changes in bowel habits.  Discharge Bowel Program:    Discharge Bladder Function: Continent  Discharge  "Urinary Devices: None      Nursing Discharge Plan:   Smoking Cessation Offered: Patient Counseled    Case Management Discharge Instructions:   Discharge Location:    Agency Name/Address/Phone:    Home Health:    Outpatient Services:    DME Provider/Phone:    Medical Equipment Ordered:    Prescription Faxed to:        Discharge Medication Instructions:  Below are the medications your physician expects you to take upon discharge:    Prevent Falls in Your Home    \"Falling once doubles your chance of falling again\"        -Center for Disease Control and Prevention    Falls in the home can lead to serious injury (fractures, brain injuries), hospitalizations, increased medical costs, and could even be fatal.  The good news is, there are many precautions you can take to avoid falls in your home and help keep you safe:     If prescribed an assistive device (walker, crutches), use as instructed by the healthcare provider\"   Remove any tripping hazards from your home, including loose cords, throw rugs and clutter  Keep a nightlight on in dark (hallways, bathrooms, etc)   Get up slowly, to make sure you feel okay before getting up  Be aware of any side effects of your medications: some medications may make you dizzy  Place a non-skid rubber mat in your shower or tub-consider a shower bench or chair if unsteady on your feet  Wear supportive shoes or non-skid socks when moving around  Start an exercise program once approved by your provider.  If you are feeling weak following a hospital stay, talk to your doctor about home health or outpatient therapy programs designed to help rebuild your strength and endurance          Right plantar foot: Nursing to remove old dressing. Cleanse wound with wound cleanser and gauze. Pat dry. Apply no sting skin barrier to marti-wound. Then cut a piece of aquacel ag (hydrofiber silver) to fit wound bed. Secure in place with an adhesive foam. Nursing to change every 48hrs and PRN dislodgement and " or drainage saturation.

## 2023-08-23 NOTE — THERAPY
Occupational Therapy  Daily Treatment     Patient Name: Richard Hubbard II  Age:  48 y.o., Sex:  male  Medical Record #: [id number]  Today's Date: 8/23/2023     Precautions  Precautions: (P) Fall Risk  Comments: (P) low BP, prothesis LLE, piedra         Subjective    Pt agreeable to OT session.      Objective       08/23/23 0831   OT Charge Group   OT Therapeutic Exercise (Units) 4   OT Total Time Spent   OT Individual Total Time Spent (Mins) 60   Precautions   Precautions Fall Risk   Comments low BP, prothesis LLE, piedra   Cognition    Level of Consciousness Alert   Functional Level of Assist   Bed, Chair, Wheelchair Transfer Contact Guard Assist   Bed Chair Wheelchair Transfer Description Increased time;Set-up of equipment;Supervision for safety   Sitting Upper Body Exercises   Upper Extremity Bike Level 4 Resistance  (BUE MotoMed x10 min, 1 RB)   Other Exercise Core/UB exercises seated EOM w/ 4# med ball, 3x15 each: bicep curl, chest press, abdominal twists   Bed Mobility    Supine to Sit Supervised   Scooting Supervised   Rolling Supervised   Interdisciplinary Plan of Care Collaboration   IDT Collaboration with  Nursing   Patient Position at End of Therapy Seated;Chair Alarm On;Self Releasing Lap Belt Applied;Call Light within Reach;Tray Table within Reach;Phone within Reach;Other (Comments)   Collaboration Comments handoff to RN for medication pass     W/c mobility from pt room <> therapy gym with supv, increased time.     Assessment    Pt tolerated OT session well. He con't to present with low activity tolerance and requires rest breaks and cues for pacing. Pt progressing to CGA for bed<>w/c txfrs.   Strengths: Able to follow instructions, Alert and oriented, Effective communication skills, Independent prior level of function, Pleasant and cooperative, Supportive family, Willingly participates in therapeutic activities  Barriers: Decreased endurance, Fatigue, Generalized weakness, Impaired activity  tolerance, Impaired balance    Plan    ADL's, IADL's at w/c level, standing bal/tolerance, strengthening/endurance, functional mobility     Occupational Therapy Goals (Active)       Problem: Dressing       Dates: Start:  08/21/23         Goal: STG-Within one week, patient will dress UB with set-up assist        Dates: Start:  08/21/23            Goal: STG-Within one week, patient will dress LB with mod a        Dates: Start:  08/21/23               Problem: Functional Transfers       Dates: Start:  08/21/23         Goal: STG-Within one week, patient will transfer to toilet with mod a        Dates: Start:  08/21/23               Problem: OT Long Term Goals       Dates: Start:  08/21/23         Goal: LTG-By discharge, patient will complete basic self care tasks with mod I-SPV       Dates: Start:  08/21/23            Goal: LTG-By discharge, patient will perform bathroom transfers with mod I-SPV       Dates: Start:  08/21/23            Goal: LTG-By discharge, patient will complete basic home management with mod I-SPV from w/c level        Dates: Start:  08/21/23               Problem: Toileting       Dates: Start:  08/21/23         Goal: STG-Within one week, patient will complete toileting tasks with mod a        Dates: Start:  08/21/23

## 2023-08-23 NOTE — PROGRESS NOTES
Received bedside shift report from Emilie VIVEROS RN regarding patient and assumed care. Patient awake, calm and stable, currently positioned in bed for comfort and safety; call light within reach. Denies pain or discomfort at this time. Will continue to monitor.

## 2023-08-23 NOTE — PROGRESS NOTES
NURSING DAILY NOTE    Name: Richard Hubbard II   Date of Admission: 8/20/2023   Admitting Diagnosis: Acute diastolic heart failure (HCC)  Attending Physician: Keaton Pisano M.d.  Allergies: Nsaids, Diphenhydramine, Mirtazapine, Penicillins, and Promethazine    Safety  Patient Assist  Mod Assist  Patient Precautions  Fall Risk  Precaution Comments  low BP, prothesis LLE, pidera  Bed Transfer Status  Moderate Assist  Toilet Transfer Status   Maximal Assist  Assistive Devices  Rails, Wheelchair  Oxygen  None - Room Air  Diet/Therapeutic Dining  Current Diet Order   Procedures    Diet Order Diet: Regular (For 8/23/23 breakfast, pt is requesting victor, eggs and pancakes.  Plus with all meals, he would like 2 cokes on his trays.  Thank you.)     Pill Administration  whole  Agitated Behavioral Scale     ABS Level of Severity       Fall Risk  Has the patient had a fall this admission?   No  Osiris Healy Fall Risk Scoring  15, HIGH RISK  Fall Risk Safety Measures  bed alarm and bed strip alarm    Vitals  Temperature: 37 °C (98.6 °F)  Temp src: Oral  Pulse: 74  Respiration: 18  Blood Pressure: 94/54  Blood Pressure MAP (Calculated): 67 MM HG  BP Location: Right, Upper Arm  Patient BP Position: Supine     Oxygen  Pulse Oximetry: 95 %  O2 (LPM): 0  FiO2%: 21 %  O2 Delivery Device: None - Room Air    Bowel and Bladder  Last Bowel Movement  08/21/23  Stool Type  Type 5: Soft blob with clear cut edges (passed easily)  Bowel Device  Bathroom, Other (Comment) (Bowel Meds)  Continent  Bladder: Continent void (Piedra catheter)   Bowel: Continent movement  Bladder Function  Urine Void (mL): 700 ml  Urine Color: Yellow  Genitourinary Assessment   Bladder Assessment (WDL):  WDL Except  Piedra Catheter: Present with Active Order  Piedra Reasons per MD Order: Acute urinary retention or bladder outlet obstruction  Urinary Elimination: Catheter (Document on LDA)  Urine  Color: Yellow  Bladder Device: Indwelling Catheter    Skin  Nam Score   18  Sensory Interventions      Moisture Interventions  Moisturizers/Barriers: Barrier Paste, Barrier Wipes      Pain  Pain Rating Scale  7 - Focus of attention, prevents doing daily activities  Pain Location  Generalized  Pain Location Orientation  Right, Left, Upper  Pain Interventions   Rest    ADLs    Bathing      Linen Change      Personal Hygiene     Chlorhexidine Bath      Oral Care     Teeth/Dentures  Broken Teeth (Comments)  Shave     Nutrition Percentage Eaten  *  * Meal *  *, Dinner, Between 50-75% Consumed  Environmental Precautions  Treaded Slipper Socks on Patient, Bed in Low Position  Patient Turns/Positioning  Patient Turns Self from Side to Side  Patient Turns Assistance/Tolerance     Bed Positions  Bed Controls On, Bed Locked  Head of Bed Elevated  Self regulated      Psychosocial/Neurologic Assessment  Psychosocial Assessment  Psychosocial (WDL):  WDL Except  Patient Behaviors: Fatigue  Neurologic Assessment  Neuro (WDL): Within Defined Limits  Level of Consciousness: Alert  Orientation Level: Oriented X4  Cognition: Follows commands  Speech: Clear  Pupil Assesment: Yes  R Pupil Size (mm): 2  R Pupil Shape / Description: Round  R Pupil Reaction: Brisk  L Pupil Size (mm): 2  L Pupil Shape / Description: Round  L Pupil Reaction: Brisk  EENT (WDL):  WDL Except    Cardio/Pulmonary Assessment  Edema      Respiratory Breath Sounds  RUL Breath Sounds: Clear  RML Breath Sounds: Clear, Diminished  RLL Breath Sounds: Clear, Diminished  DONALD Breath Sounds: Clear  LLL Breath Sounds: Clear, Diminished  Cardiac Assessment   Cardiac (WDL):  WDL Except (H/O HTN, CHF.)

## 2023-08-23 NOTE — PROGRESS NOTES
NURSING DAILY NOTE    Name: Richard Hubbard II   Date of Admission: 8/20/2023   Admitting Diagnosis: Acute diastolic heart failure (HCC)  Attending Physician: Keaton Pisano M.d.  Allergies: Nsaids, Diphenhydramine, Mirtazapine, Penicillins, and Promethazine    Safety  Patient Assist  Mon Assist  Patient Precautions  Fall Risk  Precaution Comments  low BP, prothesis LLE, piedra  Bed Transfer Status  Moderate Assist  Toilet Transfer Status   Maximal Assist  Assistive Devices  Rails, Wheelchair  Oxygen  None - Room Air  Diet/Therapeutic Dining  Current Diet Order   Procedures    Diet Order Diet: Level 7 - Easy to Chew (advance as tolerated); Liquid level: Level 0 - Thin     Pill Administration  whole  Agitated Behavioral Scale     ABS Level of Severity       Fall Risk  Has the patient had a fall this admission?   No  Osiris Healy Fall Risk Scoring  15, HIGH RISK  Fall Risk Safety Measures  bed alarm and chair alarm    Vitals  Temperature: 37.1 °C (98.8 °F)  Temp src: Oral  Pulse: 89  Respiration: 17  Blood Pressure: 92/56  Blood Pressure MAP (Calculated): 68 MM HG  BP Location: Right, Upper Arm  Patient BP Position: Supine     Oxygen  Pulse Oximetry: 95 %  O2 (LPM): 0  FiO2%: 21 %  O2 Delivery Device: None - Room Air    Bowel and Bladder  Last Bowel Movement  08/21/23  Stool Type  Type 5: Soft blob with clear cut edges (passed easily)  Bowel Device  Bathroom, Other (Comment) (Bowel Meds)  Continent  Bladder: Continent void (Piedra catheter)   Bowel: Continent movement  Bladder Function  Urine Color: Yellow  Genitourinary Assessment   Bladder Assessment (WDL):  WDL Except  Piedra Catheter: Present with Active Order  Piedra Reasons per MD Order: Acute urinary retention or bladder outlet obstruction  Urinary Elimination: Catheter (Document on LDA)  Urine Color: Yellow  Bladder Device: Indwelling Catheter    Skin  Nam Score   18  Sensory  Interventions      Moisture Interventions  Moisturizers/Barriers: Barrier Paste, Barrier Wipes      Pain  Pain Rating Scale  7 - Focus of attention, prevents doing daily activities  Pain Location  Generalized  Pain Location Orientation  Right, Left, Upper  Pain Interventions   Medication (see MAR)    ADLs    Bathing      Linen Change      Personal Hygiene     Chlorhexidine Bath      Oral Care     Teeth/Dentures  Broken Teeth (Comments)  Shave     Nutrition Percentage Eaten  *  * Meal *  *, Dinner, Between % Consumed  Environmental Precautions  Treaded Slipper Socks on Patient, Bed in Low Position  Patient Turns/Positioning  Patient Turns Self from Side to Side  Patient Turns Assistance/Tolerance     Bed Positions  Bed Controls On, Bed Locked  Head of Bed Elevated  Self regulated      Psychosocial/Neurologic Assessment  Psychosocial Assessment  Psychosocial (WDL):  WDL Except  Patient Behaviors: Fatigue  Neurologic Assessment  Neuro (WDL): Within Defined Limits  Level of Consciousness: Alert  Orientation Level: Oriented X4  Cognition: Follows commands  Speech: Clear  Pupil Assesment: Yes  R Pupil Size (mm): 2  R Pupil Shape / Description: Round  R Pupil Reaction: Brisk  L Pupil Size (mm): 2  L Pupil Shape / Description: Round  L Pupil Reaction: Brisk  EENT (WDL):  WDL Except    Cardio/Pulmonary Assessment  Edema      Respiratory Breath Sounds  RUL Breath Sounds: Clear  RML Breath Sounds: Clear, Diminished  RLL Breath Sounds: Clear, Diminished  DONALD Breath Sounds: Clear  LLL Breath Sounds: Clear, Diminished  Cardiac Assessment   Cardiac (WDL):  WDL Except

## 2023-08-23 NOTE — PROGRESS NOTES
[facility] Medicine Daily Progress Note    Date of Service  8/23/2023      Chief Complaint:  CHF with vol overload    Interval History:  No significant events overnight.    Review of Systems  Review of Systems   Constitutional:  Negative for fever.   Eyes:  Negative for blurred vision.   Respiratory:  Negative for shortness of breath.    Cardiovascular:  Negative for palpitations.   Gastrointestinal:  Negative for nausea and vomiting.   Neurological:  Negative for dizziness and headaches.   Psychiatric/Behavioral:  Negative for hallucinations.         Physical Exam  Temp:  [36.9 °C (98.4 °F)-37.1 °C (98.8 °F)] 36.9 °C (98.4 °F)  Pulse:  [74-89] 79  Resp:  [17-18] 18  BP: ()/(54-57) 104/56  SpO2:  [95 %-98 %] 98 %    Physical Exam  Vitals and nursing note reviewed.   Constitutional:       General: He is not in acute distress.  HENT:      Mouth/Throat:      Mouth: Mucous membranes are moist.      Pharynx: Oropharynx is clear.   Eyes:      General: No scleral icterus.  Cardiovascular:      Rate and Rhythm: Normal rate and regular rhythm.      Heart sounds: No murmur heard.  Pulmonary:      Effort: Pulmonary effort is normal.      Breath sounds: Normal breath sounds. No stridor.   Abdominal:      General: There is no distension.      Palpations: Abdomen is soft.      Tenderness: There is no abdominal tenderness.   Musculoskeletal:      Cervical back: No rigidity.      Right lower leg: No edema.      Left lower leg: No edema.      Comments: Has left BKA.   Skin:     General: Skin is warm and dry.      Findings: No rash.   Neurological:      Mental Status: He is alert and oriented to person, place, and time.   Psychiatric:         Mood and Affect: Mood normal.         Behavior: Behavior normal.         Fluids    Intake/Output Summary (Last 24 hours) at 8/23/2023 0858  Last data filed at 8/23/2023 0521  Gross per 24 hour   Intake 360 ml   Output 1900 ml   Net -1540 ml         Laboratory  Recent Labs     08/21/23  0599    WBC 9.4   RBC 4.03*   HEMOGLOBIN 11.3*   HEMATOCRIT 35.1*   MCV 87.1   MCH 28.0   MCHC 32.2*   RDW 48.6   PLATELETCT 208   MPV 9.6       Recent Labs     08/21/23  0522   SODIUM 132*   POTASSIUM 4.2   CHLORIDE 95*   CO2 31   GLUCOSE 80   BUN 23*   CREATININE 1.05   CALCIUM 8.3*                     Imaging    Assessment/Plan  Acute on chronic heart failure with preserved ejection fraction (HFpEF) ([facility])  Assessment & Plan  Echo: EF 55%, RVSP 50, mod MR/AS/AR  BNP: 25,906 (8/12) --> 9686 (8/16)  S/P IV Lasix at [facility] [place]  Cont Farxiga  Cont Demadex  Cont K+ supplements  K+: 4.2 (8/21)  Cont to monitor    S/P BKA (below knee amputation) unilateral, left ([facility])- (present on admission)  Assessment & Plan  Ambulates with prosthesis    Vitamin D deficiency- (present on admission)  Assessment & Plan  Vit D: 21  Cont supplements    Chronic pain syndrome- (present on admission)  Assessment & Plan  Management per Physiatry    History of atrial fibrillation ( single episode 2019)- (present on admission)  Assessment & Plan  HR ok  S/P Cardioversion in 2019  Cont Demadex  Note: has hx of bleeding ulcer  Cont to monitor    Rheumatoid arthritis ([facility])- (present on admission)  Assessment & Plan  Chronically on Humira

## 2023-08-24 PROBLEM — D64.9 ANEMIA: Status: ACTIVE | Noted: 2023-01-01

## 2023-08-24 NOTE — PROGRESS NOTES
Assumed care of patient. Bedside report received from Flori CUEVAS. Patient is in bed. Fall precautions in place. Call light and belongings within reach. Updated on POC, all questions and concerns answered at this time. No other needs at this time.

## 2023-08-24 NOTE — CARE PLAN
Problem: Knowledge Deficit - Standard  Goal: Patient and family/care givers will demonstrate understanding of plan of care, disease process/condition, diagnostic tests and medications  Outcome: Progressing   Pt education given regarding plan of care with emphasis on adequate hydration, pt shows good understanding, will continue to reinforce education and continue to monitor.         Problem: Fall Risk - Rehab  Goal: Patient will remain free from falls  Outcome: Progressing   Pt education given regarding fall precautions AND safety measures, pt shows good understanding, has not attempted to self transfer this shift, will continue to reinforce education and continue to monitor.

## 2023-08-24 NOTE — CARE PLAN
The patient is Stable - Low risk of patient condition declining or worsening    Shift Goals  Clinical Goals: Safety  Patient Goals: Sleep    Progress made toward(s) clinical / shift goals:    Problem: Knowledge Deficit - Standard  Goal: Patient and family/care givers will demonstrate understanding of plan of care, disease process/condition, diagnostic tests and medications  Outcome: Progressing   Patient educated on the POC and medications administered. All questions and concerns addressed at this time  Problem: Pain - Standard  Goal: Alleviation of pain or a reduction in pain to the patient’s comfort goal  Outcome: Progressing   Use of 0-10 pain scale. Patient is able to verbalize pain and discomfort appropriately   Problem: Fall Risk - Rehab  Goal: Patient will remain free from falls  Outcome: Progressing  Bed is locked and in low position. Call light and belongings within reach     Patient is not progressing towards the following goals:

## 2023-08-24 NOTE — THERAPY
Physical Therapy   Daily Treatment     Patient Name: Richard Hubbard II  Age:  48 y.o., Sex:  male  Medical Record #: [id number]  Today's Date: 8/24/2023     Precautions  Precautions: Fall Risk  Comments: low BP, prothesis LLE, piedra    Subjective    Pt was seated in w/c upon arrival and agreeable to treatment.  Pt reported pain in joints and fatigue.      Objective       08/24/23 1331   PT Charge Group   PT Therapeutic Exercise (Units) 1   PT Therapeutic Activities (Units) 1   PT Total Time Spent   PT Individual Total Time Spent (Mins) 30   Precautions   Precautions Fall Risk   Interdisciplinary Plan of Care Collaboration   Patient Position at End of Therapy Seated;Call Light within Reach;Tray Table within Reach;Phone within Reach     Pt given UE HEP for primary WC users with focus on shoulder injury prevention.  Reviewed with pt the following exercises (all x 10 reps):  Shoulder extension (blue TB)  Scapular retraction  Tricep extension (blue TB)  ER (orange TB)  D1 and D2 PNF patterns (blue TB)  Flies (blue TB)   Shoulder depression    Assessment    Pt with good effort with UE exercises with min VCing on form.  Pt verbalized education on importance of shoulder injury prevention.   Strengths: Able to follow instructions, Independent prior level of function, Motivated for self care and independence, Pleasant and cooperative, Supportive family, Willingly participates in therapeutic activities  Barriers: Decreased endurance, Fatigue, Generalized weakness, Hypotension, Impaired balance, Impaired activity tolerance, Limited mobility (ill fitting prosthesis, poly-joint RA)    Plan    Wc endurance, sit<>stand and progressive gait training with FWW and L prosthesis, supine/ sidelying and prone mat program.     Passport items to be completed:  Get in/out of bed safely, in/out of a vehicle, safely use mobility device, walk or wheel around home/community, navigate up and down stairs, show how to get up/down from the  ground, ensure home is accessible, demonstrate HEP, complete caregiver training       Physical Therapy Problems (Active)       Problem: Balance       Dates: Start:  08/21/23         Goal: STG-Within one week, patient will maintain static standing 2 minutes x 3 with  UE support at // bars for wt shifting/ prosthesis education and limb desensitization       Dates: Start:  08/21/23               Problem: Mobility Transfers       Dates: Start:  08/21/23         Goal: STG-Within one week, patient will perform bed mobility modified independent       Dates: Start:  08/21/23               Problem: PT-Long Term Goals       Dates: Start:  08/21/23         Goal: LTG-By discharge, patient will tolerate standing 5 minutes x 2 for wt shifting/ balance training and prosthesis training/ tolerance       Dates: Start:  08/21/23            Goal: LTG-By discharge, patient will propel wheelchair modified independent 50 ft x 2       Dates: Start:  08/21/23            Goal: LTG-By discharge, patient will ambulate SBA with FWW/ L prosthesis x 50 ft       Dates: Start:  08/21/23            Goal: LTG-By discharge, patient will transfer one surface to another modified independent with or without prosthesis       Dates: Start:  08/21/23            Goal: LTG-By discharge, patient will transfer in/out of a car modified independent with or without prosthesis       Dates: Start:  08/21/23

## 2023-08-24 NOTE — THERAPY
Occupational Therapy  Daily Treatment     Patient Name: Richard Hubbard II  Age:  48 y.o., Sex:  male  Medical Record #: [id number]  Today's Date: 8/24/2023     Precautions  Precautions: (P) Fall Risk  Comments: (P) low BP, prothesis LLE, piedra         Subjective    Pt reported fatigue, but pleasant and motivated for therapy.     Objective       08/24/23 1401   OT Charge Group   OT Therapy Activity (Units) 1   OT Therapeutic Exercise (Units) 3   OT Total Time Spent   OT Individual Total Time Spent (Mins) 60   Precautions   Precautions Fall Risk   Comments low BP, prothesis LLE, piedra   Cognition    Level of Consciousness Alert   Sitting Upper Body Exercises   Bilateral Row 3 sets of 15;Bilateral;Weight (See Comments for lbs)  (25# on Equalizer)   Tricep Press 3 sets of 15;Bilateral;Weight (See Comments for lbs)  (25# on Rickshaw facing fwd.)   IADL Treatments   Home Management Pt completed laundry task from w/c level with set up/supv, including gathering clothing, loading washer with clothing/soap and managing controls.   Supine Lower Body Exercise   Other Exercises Shuttle for RLE strengthening with 5 bands, 5x15 reps; Min A for txfr from w/c<>shuttle.   Interdisciplinary Plan of Care Collaboration   Patient Position at End of Therapy Seated;Self Releasing Lap Belt Applied;Call Light within Reach;Tray Table within Reach;Phone within Reach     W/c mobility from pt room <> therapy gym with supv, increased time.     Assessment    Pt progressing with overall strength and endurance. He was able to complete laundry task from w/c level with set up/supv. Pt con't to present with decreased activity tolerance and fatigue.   Strengths: Able to follow instructions, Alert and oriented, Effective communication skills, Independent prior level of function, Pleasant and cooperative, Supportive family, Willingly participates in therapeutic activities  Barriers: Decreased endurance, Fatigue, Generalized weakness, Impaired activity  tolerance, Impaired balance    Plan    ADL's, IADL's at w/c level, standing bal/tolerance, strengthening/endurance, functional mobility     Occupational Therapy Goals (Active)       Problem: Functional Transfers       Dates: Start:  08/24/23         Goal: STG-Within one week, patient will transfer to tub/shower with supervision using w/c and tub txfr bench.        Dates: Start:  08/24/23               Problem: IADL's       Dates: Start:  08/24/23         Goal: STG-Within one week, patient will access kitchen area at supv level using AE as needed.        Dates: Start:  08/24/23               Problem: OT Long Term Goals       Dates: Start:  08/21/23         Goal: LTG-By discharge, patient will complete basic self care tasks with mod I-SPV       Dates: Start:  08/21/23            Goal: LTG-By discharge, patient will perform bathroom transfers with mod I-SPV       Dates: Start:  08/21/23            Goal: LTG-By discharge, patient will complete basic home management with mod I-SPV from w/c level        Dates: Start:  08/21/23

## 2023-08-24 NOTE — PROGRESS NOTES
NURSING DAILY NOTE    Name: Richard Hubbard II   Date of Admission: 8/20/2023   Admitting Diagnosis: Acute diastolic heart failure (HCC)  Attending Physician: Keaton Pisano M.d.  Allergies: Nsaids, Diphenhydramine, Mirtazapine, Penicillins, and Promethazine    Safety  Patient Assist  Mod  Patient Precautions  Fall Risk  Precaution Comments  low BP, prothesis LLE, piedra  Bed Transfer Status  Contact Guard Assist  Toilet Transfer Status   Maximal Assist  Assistive Devices  Rails, Wheelchair  Oxygen  None - Room Air  Diet/Therapeutic Dining  Current Diet Order   Procedures    Diet Order Diet: Regular (For 8/23/23 breakfast, pt is requesting victor, eggs and pancakes.  Plus with all meals, he would like 2 cokes on his trays.  Thank you.)     Pill Administration  whole  Agitated Behavioral Scale     ABS Level of Severity       Fall Risk  Has the patient had a fall this admission?   No  Osiris Healy Fall Risk Scoring  15, HIGH RISK  Fall Risk Safety Measures  bed alarm and chair alarm    Vitals  Temperature: 36.9 °C (98.4 °F)  Temp src: Oral  Pulse: 80  Respiration: 18  Blood Pressure: 101/56  Blood Pressure MAP (Calculated): 71 MM HG  BP Location: Right, Upper Arm  Patient BP Position: Supine     Oxygen  Pulse Oximetry: 95 %  O2 (LPM): 0  FiO2%: 21 %  O2 Delivery Device: None - Room Air    Bowel and Bladder  Last Bowel Movement  08/23/23  Stool Type  Type 3: Like a sausage, but with cracks on its surface  Bowel Device  Bathroom  Continent  Bladder: Continent void (Piedra catheter)   Bowel: Continent movement  Bladder Function  Urine Void (mL): 700 ml  Urine Color: Yellow  Genitourinary Assessment   Bladder Assessment (WDL):  WDL Except  Piedra Catheter: Present with Active Order  Piedra Reasons per MD Order: Acute urinary retention or bladder outlet obstruction  Urinary Elimination: Catheter (Document on LDA)  Urine Color: Yellow  Bladder Device:  Indwelling Catheter    Skin  Nam Score   18  Sensory Interventions      Moisture Interventions  Moisturizers/Barriers: Barrier Wipes, Barrier Paste      Pain  Pain Rating Scale  9 - Can't bear the pain, unable to do anything  Pain Location  Generalized  Pain Location Orientation  Right, Left, Upper  Pain Interventions   Medication (see MAR)    ADLs    Bathing      Linen Change      Personal Hygiene     Chlorhexidine Bath      Oral Care     Teeth/Dentures  Broken Teeth (Comments)  Shave     Nutrition Percentage Eaten  *  * Meal *  *, Dinner, Between 50-75% Consumed  Environmental Precautions  Treaded Slipper Socks on Patient, Personal Belongings, Wastebasket, Call Bell etc. in Easy Reach, Transferred to Stronger Side, Report Given to Other Health Care Providers Regarding Fall Risk, Bed in Low Position  Patient Turns/Positioning  Patient Turns Self from Side to Side  Patient Turns Assistance/Tolerance     Bed Positions  Bed Controls On, Bed Locked  Head of Bed Elevated  Self regulated      Psychosocial/Neurologic Assessment  Psychosocial Assessment  Psychosocial (WDL):  WDL Except  Patient Behaviors: Fatigue  Neurologic Assessment  Neuro (WDL): Within Defined Limits  Level of Consciousness: Alert  Orientation Level: Oriented X4  Cognition: Follows commands  Speech: Clear  Pupil Assesment: Yes  R Pupil Size (mm): 2  R Pupil Shape / Description: Round  R Pupil Reaction: Brisk  L Pupil Size (mm): 2  L Pupil Shape / Description: Round  L Pupil Reaction: Brisk  EENT (WDL):  WDL Except    Cardio/Pulmonary Assessment  Edema      Respiratory Breath Sounds  RUL Breath Sounds: Clear  RML Breath Sounds: Clear, Diminished  RLL Breath Sounds: Clear, Diminished  DONALD Breath Sounds: Clear  LLL Breath Sounds: Clear, Diminished  Cardiac Assessment   Cardiac (WDL):  WDL Except (H/O HTN, CHF.)

## 2023-08-24 NOTE — PROGRESS NOTES
Physical Medicine & Rehabilitation Progress Note    Encounter Date: 8/24/2023    Chief Complaint: Decreased mobility, weakness    Interval Events (Subjective):  Patient sitting up in room. He reports some blood yesterday from bag vs GI source. He reports history of GI bleeds. He was able to walk > 125 feet. Will discontinue Heparin. Discussed will monitor. Discussed will have IDT later today to discuss discharge planning.     _____________________________________  Interdisciplinary Team Conference   Most recent IDT on 8/24/2023    IKeaton M.D./Ph.D., was present and led the interdisciplinary team conference on 8/24/2023.  I led the IDT conference and agree with the IDT conference documentation and plan of care as noted below.     Nursing:  Diet Current Diet Order   Procedures    Diet Order Diet: Regular (For 8/23/23 breakfast, pt is requesting victor, eggs and pancakes.  Plus with all meals, he would like 2 cokes on his trays.  Thank you.)       Eating ADL Independent      % of Last Meal  Oral Nutrition: Breakfast, Between 50-75% Consumed   Sleep    Bowel Last BM: 08/23/23   Bladder Rivera   Barriers to Discharge Home:  FOBT x2  Oxygen at night     Physical Therapy:  Bed Mobility    Transfers Contact Guard Assist  Increased time, Supervision for safety, Verbal cueing, Initial preparation for task   Mobility Contact Guard Assist   Stairs Adore   Barriers to Discharge Home:  Off loading shoe new one    Occupational Therapy:  Grooming Modified Independent, Seated   Bathing Standby Assist   UB Dressing Supervision   LB Dressing Contact Guard Assist   Toileting Maximal Assist   Shower & Transfer CGA   Barriers to Discharge Home:  General debility    Respiratory Therapy:  O2 (LPM): 1  O2 Delivery Device: Silicone Nasal Cannula    Case Management:  Continues to work on disposition and DME needs.      Discharge Date/Disposition:  9/4/23  _____________________________________      Objective:  VITAL SIGNS:  "BP 94/50   Pulse 77   Temp 36.6 °C (97.8 °F) (Oral)   Resp 17   Ht 1.803 m (5' 11\")   Wt 85 kg (187 lb 6.3 oz)   SpO2 93%   BMI 26.14 kg/m²   Gen: NAD  Psych: Mood and affect appropriate  CV: RRR, 0 edema  Resp: CTAB, no upper airway sounds  Abd: NTND  Neuro: AOx4, following commands  MSK: Normal ROM R Knee  Unchanged from 8/23/23    Laboratory Values:  Recent Results (from the past 72 hour(s))   CBC WITH DIFFERENTIAL    Collection Time: 08/24/23  5:25 AM   Result Value Ref Range    WBC 7.2 4.8 - 10.8 K/uL    RBC 3.58 (L) 4.70 - 6.10 M/uL    Hemoglobin 9.7 (L) 14.0 - 18.0 g/dL    Hematocrit 31.5 (L) 42.0 - 52.0 %    MCV 88.0 81.4 - 97.8 fL    MCH 27.1 27.0 - 33.0 pg    MCHC 30.8 (L) 32.3 - 36.5 g/dL    RDW 49.2 35.9 - 50.0 fL    Platelet Count 189 164 - 446 K/uL    MPV 9.9 9.0 - 12.9 fL    Neutrophils-Polys 52.60 44.00 - 72.00 %    Lymphocytes 30.20 22.00 - 41.00 %    Monocytes 12.30 0.00 - 13.40 %    Eosinophils 3.20 0.00 - 6.90 %    Basophils 1.00 0.00 - 1.80 %    Immature Granulocytes 0.70 0.00 - 0.90 %    Nucleated RBC 0.00 0.00 - 0.20 /100 WBC    Neutrophils (Absolute) 3.81 1.82 - 7.42 K/uL    Lymphs (Absolute) 2.19 1.00 - 4.80 K/uL    Monos (Absolute) 0.89 (H) 0.00 - 0.85 K/uL    Eos (Absolute) 0.23 0.00 - 0.51 K/uL    Baso (Absolute) 0.07 0.00 - 0.12 K/uL    Immature Granulocytes (abs) 0.05 0.00 - 0.11 K/uL    NRBC (Absolute) 0.00 K/uL   Basic Metabolic Panel    Collection Time: 08/24/23  5:25 AM   Result Value Ref Range    Sodium 136 135 - 145 mmol/L    Potassium 4.2 3.6 - 5.5 mmol/L    Chloride 102 96 - 112 mmol/L    Co2 27 20 - 33 mmol/L    Glucose 80 65 - 99 mg/dL    Bun 18 8 - 22 mg/dL    Creatinine 0.90 0.50 - 1.40 mg/dL    Calcium 8.4 (L) 8.5 - 10.5 mg/dL    Anion Gap 7.0 7.0 - 16.0   MAGNESIUM    Collection Time: 08/24/23  5:25 AM   Result Value Ref Range    Magnesium 2.1 1.5 - 2.5 mg/dL   ESTIMATED GFR    Collection Time: 08/24/23  5:25 AM   Result Value Ref Range    GFR (CKD-EPI) 106 >60 " mL/min/1.73 m 2       Medications:  Scheduled Medications   Medication Dose Frequency    pregabalin  100 mg TID    vitamin D3  1,000 Units DAILY    torsemide  20 mg Q DAY    Pharmacy Consult Request  1 Each PHARMACY TO DOSE    senna-docusate  2 Tablet BID    omeprazole  20 mg DAILY    heparin  5,000 Units Q8HRS    dapagliflozin propanediol  10 mg DAILY    DULoxetine  60 mg DAILY    morphine  30 mg 4X/DAY    morphine ER  15 mg BID    potassium chloride SA  10 mEq DAILY    melatonin  3 mg QHS     PRN medications: zolpidem, Respiratory Therapy Consult, senna-docusate **AND** polyethylene glycol/lytes **AND** magnesium hydroxide **AND** bisacodyl, mag hydrox-al hydrox-simeth, sodium chloride, acetaminophen, naloxone    Diet:  Current Diet Order   Procedures    Diet Order Diet: Regular (For 8/23/23 breakfast, pt is requesting victor, eggs and pancakes.  Plus with all meals, he would like 2 cokes on his trays.  Thank you.)       Medical Decision Making and Plan:  CHF exacerbation  - Originally presented with shortness of breath and pulmonary edema as well as cardiorenal syndrome  -While at [facility], patient was on IV Lasix for diuresis  - Cardiology was consulted at [facility] for patient's known aortic and mitral regurgitation, EF 55%  - Patient has been transferred on torsemide 40 mg daily, discussed new medication with [facility] hospitalist Dr. Wynne.  - Consult hospitalist  - [facility]   -PT and OT for mobility and ADLs. Per guidelines, 15 hours per week between PT, OT and/or SLP.  -Follow-up with cardiology     Hypokalemia  - Transferred on potassium 10 mg daily  - Follow-up admission labs in the morning     Anxiety depression  - Continue Cymbalta     Anemia  - worse on 8/24/23, 9.7 from 11.3. Will hold heparin as history of GI bleeds    Insomnia  - Avoiding trazodone, patient had prolonged QTc from prior EKG  - New EKG ordered to evaluate QTc  - Replace standing as needed trazodone for melatonin.  Poor improvement with melatonin. Will trial Ambien as has tolerated in the past.      Rheumatoid arthritis  - Is on Humira q. 2 weeks  - Last dose 8/16  -Has previously been on steroids, has not been transferred to [facility] rehab on steroids, may consider restarting if patient has an RA flare     History of left BKA  - Previously at rehab in March/April 2023  - Monitor for edema control and residual limb for proper fit with prosthesis, wife will be bringing left BKA prosthesis     Chronic pain  - Chronically followed by pain management  - Is on home dose morphine 30 mg 4 times a day as well as MS Contin 15 mg twice daily  - Do not anticipate patient will need increased as needed pain medications  - Naloxone as needed narcotics overdose. Start Lyrica 100 mg TID. Continue Lyrica     Vitamin D deficiency  21 on admission.  - Start 1000 U     Skin - Patient at risk for skin breakdown due to debility in areas including sacrum, achilles, elbows and head in addition to other sites. Nursing to assess skin daily.      GI Ppx - Patient on Prilosec for GERD prophylaxis. Patient on Senna-docusate for constipation prophylaxis.      DVT Ppx - heparin on transfer. Discontinue Heparin   ____________________________________    T. Loi Pisano MD/PhD  [facility] - Physical Medicine & Rehabilitation   [facility] - Brain Injury Medicine   ____________________________________    Total time:  50 minutes. Time spent included pre-rounding review of vitals and tests, unit/floor time, face-to-face time with the patient including physical examination, care coordination, counseling of patient and/or family, ordering medications/procedures/tests, discussion with CM, PT, OT, SLP and/or other healthcare providers, and documentation in the electronic medical record. Topics discussed included discharge planning, continue lyrica, ambien for sleep, and new anemia. Patient was discussed separately in IDT today; please see details above.

## 2023-08-24 NOTE — PROGRESS NOTES
[facility] Medicine Daily Progress Note    Date of Service  8/24/2023      Chief Complaint:  CHF with vol overload    Interval History:  Discussed with pt about his H&H dropping this am.  Will repeat CBC and check Fe, B12, FOB.  D/W Physiatry -- will stop Heparin.    Review of Systems  Review of Systems   Constitutional:  Negative for fever.   Eyes:  Negative for blurred vision.   Respiratory:  Negative for cough.    Cardiovascular:  Negative for chest pain.   Gastrointestinal:  Negative for diarrhea.   Musculoskeletal:  Negative for joint pain.   Neurological:  Negative for dizziness.   Psychiatric/Behavioral:  The patient is not nervous/anxious.         Physical Exam  Temp:  [36.6 °C (97.8 °F)-36.6 °C (97.9 °F)] 36.6 °C (97.8 °F)  Pulse:  [71-80] 77  Resp:  [17-18] 17  BP: ()/(50-62) 94/50  SpO2:  [93 %-95 %] 93 %    Physical Exam  Vitals and nursing note reviewed.   Constitutional:       Appearance: He is not diaphoretic.   HENT:      Mouth/Throat:      Pharynx: No oropharyngeal exudate or posterior oropharyngeal erythema.   Eyes:      Extraocular Movements: Extraocular movements intact.   Neck:      Vascular: No carotid bruit.   Cardiovascular:      Rate and Rhythm: Normal rate and regular rhythm.      Heart sounds: No murmur heard.  Pulmonary:      Effort: Pulmonary effort is normal.      Breath sounds: Normal breath sounds. No stridor.   Abdominal:      General: Bowel sounds are normal.      Palpations: Abdomen is soft.   Musculoskeletal:      Right lower leg: No edema.      Left lower leg: No edema.      Comments: Has left BKA.   Skin:     General: Skin is warm and dry.      Findings: No rash.   Neurological:      Mental Status: He is alert and oriented to person, place, and time.   Psychiatric:         Mood and Affect: Mood normal.         Behavior: Behavior normal.         Fluids    Intake/Output Summary (Last 24 hours) at 8/24/2023 0936  Last data filed at 8/24/2023 0900  Gross per 24 hour   Intake 360  ml   Output 1400 ml   Net -1040 ml         Laboratory  Recent Labs     08/24/23  0525   WBC 7.2   RBC 3.58*   HEMOGLOBIN 9.7*   HEMATOCRIT 31.5*   MCV 88.0   MCH 27.1   MCHC 30.8*   RDW 49.2   PLATELETCT 189   MPV 9.9       Recent Labs     08/24/23  0525   SODIUM 136   POTASSIUM 4.2   CHLORIDE 102   CO2 27   GLUCOSE 80   BUN 18   CREATININE 0.90   CALCIUM 8.4*                     Imaging    Assessment/Plan  Anemia  Assessment & Plan  HB: 11.3 (8/21) --> 9.7 (8/24)  Will recheck H&H  Will check Fe, B12 levels  Will check SFOB  D/W Physiatry -- will d/c Heparin  Note: has a hx of bleeding gastric ulcer  Cont to monitor    Acute on chronic heart failure with preserved ejection fraction (HFpEF) ([facility])  Assessment & Plan  Echo: EF 55%, RVSP 50, mod MR/AS/AR  BNP: 25,906 (8/12) --> 9686 (8/16)  [place] [facility]  Cont Farxiga  Cont Demadex  Cont K+ supplements  K+: 4.2 (8/24)  Cont to monitor    S/P BKA (below knee amputation) unilateral, left ([facility])- (present on admission)  Assessment & Plan  Ambulates with prosthesis    Vitamin D deficiency- (present on admission)  Assessment & Plan  Vit D: 21  Cont supplements    Chronic pain syndrome- (present on admission)  Assessment & Plan  Management per Physiatry    History of atrial fibrillation ( single episode 2019)- (present on admission)  Assessment & Plan  HR ok  Cont Demadex  Note: had cardioversion in 2019  Note: has hx of bleeding ulcer  Cont to monitor    Rheumatoid arthritis ([facility])- (present on admission)  Assessment & Plan  Chronically on Humira

## 2023-08-24 NOTE — CARE PLAN
Problem: Dressing  Goal: STG-Within one week, patient will dress UB with set-up assist   Outcome: Met  Goal: STG-Within one week, patient will dress LB with mod a   Outcome: Met     Problem: Toileting  Goal: STG-Within one week, patient will complete toileting tasks with mod a   Outcome: Met     Problem: Functional Transfers  Goal: STG-Within one week, patient will transfer to toilet with mod a   Outcome: Met

## 2023-08-24 NOTE — THERAPY
"Physical Therapy   Daily Treatment     Patient Name: Richard Hubbard II  Age:  48 y.o., Sex:  male  Medical Record #: [id number]  Today's Date: 8/24/2023     Precautions  Precautions: Fall Risk  Comments: low BP, prothesis LLE, piedra    Subjective    Patient is found in bed eating breakfast, is agreeable to participate. Reports pain \"is there\". Isn't as bad as yesterday.     Objective       08/24/23 0831   PT Charge Group   PT Gait Training (Units) 2   PT Therapeutic Exercise (Units) 2   PT Neuromuscular Re-Education / Balance (Units) 1   PT Therapeutic Activities (Units) 1   PT Total Time Spent   PT Individual Total Time Spent (Mins) 90   Gait Functional Level of Assist    Gait Level Of Assist Contact Guard Assist   Assistive Device Front Wheel Walker   Distance (Feet) 40  (160 total: 40, 40, 80 around mat table)   Transfer Functional Level of Assist   Bed, Chair, Wheelchair Transfer Contact Guard Assist   Bed Chair Wheelchair Transfer Description Increased time;Supervision for safety;Verbal cueing;Initial preparation for task   Neuro-Muscular Treatments   Neuro-Muscular Treatments Anterior weight shift;Weight Shift Right;Weight Shift Left;Compensatory Strategies;Postural Facilitation;Verbal Cuing   Comments lateral weight shifts with no UE support 2 x 15 B, AP weight shifts with feet in wide semi tandem stance no UE support 2 x 15B, lateral weight shifts with stepping in place no UE support 2 x15B   Outcome Measures   Outcome Measures Utilized 5x STS   5x STS (Five Times Sit to Stand Test)   Height of Sitting Surface (inches) 25   5x Sit to STand (seconds) 33   Score Definition Fall Risk   Sit to Stand Comments no UE support arms across chest, stood from mat table   Interdisciplinary Plan of Care Collaboration   IDT Collaboration with  Physician;Nursing   Patient Position at End of Therapy Seated;Call Light within Reach;Tray Table within Reach;Phone within Reach   Collaboration Comments MD llanos, RN provided " "meds   Physical Therapist Assigned   Assigned PT / Treatment Time / Comments EB 90 mins/ day         Assessment    Patient demonstrates improved ability to stand, weight bear, weight shift than yesterday. He tolerates ambulation with FWW and prosthetic limb with 33 ply total (six 5ply, one 3ply). Standing balance tasks done from mat table and no assistive device. He reports pain in hips with sit to stand activity, is initially reluctant to bend at hips for return to sitting and instead wants to \"plop\". He is very motivated, very pleasant. 5x sit to stand indicates high fall risk, patient has fallen 3-4 times since last stay at rehab.     Strengths: Able to follow instructions, Independent prior level of function, Motivated for self care and independence, Pleasant and cooperative, Supportive family, Willingly participates in therapeutic activities  Barriers: Decreased endurance, Fatigue, Generalized weakness, Hypotension, Impaired balance, Impaired activity tolerance, Limited mobility (ill fitting prosthesis, poly-joint RA)    Plan    Wc endurance, sit<>stand and progressive gait training with FWW and L prosthesis, supine/ sidelying and prone mat program.    Passport items to be completed:  Get in/out of bed safely, in/out of a vehicle, safely use mobility device, walk or wheel around home/community, navigate up and down stairs, show how to get up/down from the ground, ensure home is accessible, demonstrate HEP, complete caregiver training    Physical Therapy Problems (Active)       Problem: Balance       Dates: Start:  08/21/23         Goal: STG-Within one week, patient will maintain static standing 2 minutes x 3 with  UE support at // bars for wt shifting/ prosthesis education and limb desensitization       Dates: Start:  08/21/23               Problem: Mobility       Dates: Start:  08/21/23         Goal: STG-Within one week, patient will propel wheelchair household distances modified independent 50 ft x 2       " Dates: Start:  08/21/23            Goal: STG-Within one week, patient will ambulate household distance min A with FWW x 25 ft       Dates: Start:  08/21/23               Problem: Mobility Transfers       Dates: Start:  08/21/23         Goal: STG-Within one week, patient will perform bed mobility modified independent       Dates: Start:  08/21/23            Goal: STG-Within one week, patient will transfer bed to chair CGA for stand pivot without prosthesis vs stand-step with FWW and prosthesis       Dates: Start:  08/21/23               Problem: PT-Long Term Goals       Dates: Start:  08/21/23         Goal: LTG-By discharge, patient will tolerate standing 5 minutes x 2 for wt shifting/ balance training and prosthesis training/ tolerance       Dates: Start:  08/21/23            Goal: LTG-By discharge, patient will propel wheelchair modified independent 50 ft x 2       Dates: Start:  08/21/23            Goal: LTG-By discharge, patient will ambulate SBA with FWW/ L prosthesis x 50 ft       Dates: Start:  08/21/23            Goal: LTG-By discharge, patient will transfer one surface to another modified independent with or without prosthesis       Dates: Start:  08/21/23            Goal: LTG-By discharge, patient will transfer in/out of a car modified independent with or without prosthesis       Dates: Start:  08/21/23

## 2023-08-24 NOTE — CARE PLAN
Problem: Balance  Goal: STG-Within one week, patient will maintain static standing 2 minutes x 3 with  UE support at // bars for wt shifting/ prosthesis education and limb desensitization  Outcome: Not Met     Problem: Mobility Transfers  Goal: STG-Within one week, patient will perform bed mobility modified independent  Outcome: Not Met     Problem: Mobility  Goal: STG-Within one week, patient will propel wheelchair household distances modified independent 50 ft x 2  Outcome: Met  Goal: STG-Within one week, patient will ambulate household distance min A with FWW x 25 ft  Outcome: Met     Problem: Mobility Transfers  Goal: STG-Within one week, patient will transfer bed to chair CGA for stand pivot without prosthesis vs stand-step with FWW and prosthesis  Outcome: Met

## 2023-08-25 NOTE — THERAPY
Physical Therapy   Daily Treatment     Patient Name: Richard Hubbard II  Age:  48 y.o., Sex:  male  Medical Record #: [id number]  Today's Date: 8/25/2023     Precautions  Precautions: Fall Risk, Other (See Comments)  Comments: low BP, prothesis LLE, piedra    Subjective    Patient is agreeable to participate for both sessions ([id number] and [id number]), is frustrated at 1030 session due to dietary mistakes for lunch.      Objective       08/25/23 0831 08/25/23 1031   PT Charge Group   PT Gait Training (Units) 3  --    PT Therapeutic Exercise (Units) 1 2   PT Total Time Spent   PT Individual Total Time Spent (Mins) 60 30   Gait Functional Level of Assist    Gait Level Of Assist Contact Guard Assist  --    Assistive Device Front Wheel Walker  --    Distance (Feet)   (120, 150, 225 ft. 2 min walk test 175 ft)  --    # of Times Distance was Traveled 3  --    Deviation Antalgic;Bradykinetic  (forward flexed posture)  --    Transfer Functional Level of Assist   Bed, Chair, Wheelchair Transfer Contact Guard Assist Supervised   Bed Chair Wheelchair Transfer Description Increased time;Supervision for safety;Verbal cueing Increased time;Supervision for safety   Supine Lower Body Exercise   Other Exercises  --  leg press shuttle 5 x 1 min each with emphasis on keeping heel down with deep knee flexion   Standing Lower Body Exercises   Hip Extension 2 sets of 10;Bilateral   --    Marching 2 sets of 10  --    Bed Mobility    Supine to Sit Supervised  --    Sit to Stand Supervised  --    Interdisciplinary Plan of Care Collaboration   IDT Collaboration with  Nursing Dietician   Patient Position at End of Therapy Seated;Chair Alarm On;Call Light within Reach;Tray Table within Reach;Phone within Reach Seated   Collaboration Comments RN provided AM meds during session left seated up in chair to speak with kitchen person ordering meal     2 min walk test 175 ft with one standing rest break. He demonstrates no losses of balance with  turning/ managing walker.     Assessment    Patient is ambulating farther between rest breaks. He is limited by pain in shoulder and pain in R foot wound. Requires 30ply for donning leg for ambulation today. Standing strengthening exercises limited by kyphotic posture and generalized weakness. He is making good progress towards goals/ PLOF.    Strengths: Able to follow instructions, Independent prior level of function, Motivated for self care and independence, Pleasant and cooperative, Supportive family, Willingly participates in therapeutic activities  Barriers: Decreased endurance, Fatigue, Generalized weakness, Hypotension, Impaired balance, Impaired activity tolerance, Limited mobility (ill fitting prosthesis, poly-joint RA)    Plan    Wc endurance, sit<>stand and progressive gait training with FWW and L prosthesis, supine/ sidelying and prone mat program.    Passport items to be completed:  Get in/out of bed safely, in/out of a vehicle, safely use mobility device, walk or wheel around home/community, navigate up and down stairs, show how to get up/down from the ground, ensure home is accessible, demonstrate HEP, complete caregiver training    Physical Therapy Problems (Active)       Problem: Balance       Dates: Start:  08/21/23         Goal: STG-Within one week, patient will maintain static standing 2 minutes x 3 with  UE support at // bars for wt shifting/ prosthesis education and limb desensitization       Dates: Start:  08/21/23               Problem: Mobility Transfers       Dates: Start:  08/21/23         Goal: STG-Within one week, patient will perform bed mobility modified independent       Dates: Start:  08/21/23               Problem: PT-Long Term Goals       Dates: Start:  08/21/23         Goal: LTG-By discharge, patient will tolerate standing 5 minutes x 2 for wt shifting/ balance training and prosthesis training/ tolerance       Dates: Start:  08/21/23            Goal: LTG-By discharge, patient will  propel wheelchair modified independent 50 ft x 2       Dates: Start:  08/21/23            Goal: LTG-By discharge, patient will ambulate SBA with FWW/ L prosthesis x 50 ft       Dates: Start:  08/21/23            Goal: LTG-By discharge, patient will transfer one surface to another modified independent with or without prosthesis       Dates: Start:  08/21/23            Goal: LTG-By discharge, patient will transfer in/out of a car modified independent with or without prosthesis       Dates: Start:  08/21/23

## 2023-08-25 NOTE — THERAPY
Physical Therapy   Daily Treatment     Patient Name: Richard Hubbard II  Age:  48 y.o., Sex:  male  Medical Record #: [id number]  Today's Date: 8/25/2023     Precautions  Precautions: Fall Risk, Other (See Comments)  Comments: low BP, prothesis LLE, piedra    Subjective    Pt received in room in bed, resting. Agreeable to PT session. Due to length of this session chose not to don prosthesis because it takes ~15 min to put on the socks.     Objective       08/25/23 1301   PT Charge Group   PT Therapeutic Exercise (Units) 1   PT Therapeutic Activities (Units) 1  30 min spent with pt.   Precautions   Precautions Fall Risk;Other (See Comments)   Comments low BP, prothesis LLE, piedra   Cognition    Level of Consciousness Alert   Sleep/Wake Cycle   Sleep & Rest Resting   Transfer Functional Level of Assist   Bed, Chair, Wheelchair Transfer Minimal Assist  (squat-pivot tx from bed to wc to pt's L with Min A)   Sitting Lower Body Exercises   Nustep Resistance Level 6;Resistance Level 5  (UE+R LE Propulsion x10 min, 0.26 miles completed)   Bed Mobility    Supine to Sit Supervised   Sit to Stand Contact Guard Assist   Interdisciplinary Plan of Care Collaboration   IDT Collaboration with  Nursing;Occupational Therapist   Patient Position at End of Therapy Seated;Call Light within Reach;Tray Table within Reach;Phone within Reach   Collaboration Comments administered meds at start of session; with OT re: MAGDA     Pt edu: safety with transfers.    Assessment    Pt participative with session. Presents with generalized weakness, decreased endurance, impaired standing balance. Impaired safety observed during transfers without prosthesis donned, may benefit from further transfer practice and education.    Strengths: Able to follow instructions, Independent prior level of function, Motivated for self care and independence, Pleasant and cooperative, Supportive family, Willingly participates in therapeutic activities  Barriers:  Decreased endurance, Fatigue, Generalized weakness, Hypotension, Impaired balance, Impaired activity tolerance, Limited mobility (ill fitting prosthesis, poly-joint RA)    Plan    Wc endurance, sit<>stand and progressive gait training with FWW and L prosthesis, supine/ sidelying and prone mat program.        Physical Therapy Problems (Active)       Problem: Balance       Dates: Start:  08/21/23         Goal: STG-Within one week, patient will maintain static standing 2 minutes x 3 with  UE support at // bars for wt shifting/ prosthesis education and limb desensitization       Dates: Start:  08/21/23               Problem: Mobility Transfers       Dates: Start:  08/21/23         Goal: STG-Within one week, patient will perform bed mobility modified independent       Dates: Start:  08/21/23               Problem: PT-Long Term Goals       Dates: Start:  08/21/23         Goal: LTG-By discharge, patient will tolerate standing 5 minutes x 2 for wt shifting/ balance training and prosthesis training/ tolerance       Dates: Start:  08/21/23            Goal: LTG-By discharge, patient will propel wheelchair modified independent 50 ft x 2       Dates: Start:  08/21/23            Goal: LTG-By discharge, patient will ambulate SBA with FWW/ L prosthesis x 50 ft       Dates: Start:  08/21/23            Goal: LTG-By discharge, patient will transfer one surface to another modified independent with or without prosthesis       Dates: Start:  08/21/23            Goal: LTG-By discharge, patient will transfer in/out of a car modified independent with or without prosthesis       Dates: Start:  08/21/23

## 2023-08-25 NOTE — CARE PLAN
The patient is Stable - Low risk of patient condition declining or worsening    Shift Goals  Clinical Goals: Safety  Patient Goals: sleep    Progress made toward(s) clinical / shift goals:    Problem: Fall Risk - Rehab  Goal: Patient will remain free from falls  Outcome: Progressing. Patient bed in lowest locked position with bed alarm on and call light within reach. Patient calls appropriately with call light for needs. Patient has not been impulsive over shift.      Problem: Bladder / Voiding  Goal: Patient will establish and maintain regular urinary output  Outcome: Progressing. Patient has indwelling piedra catheter in place for retention. Patient voiding large amount of yellow clear urine over shift.

## 2023-08-25 NOTE — CARE PLAN
The patient is Stable - Low risk of patient condition declining or worsening    Shift Goals  Clinical Goals: Safety  Patient Goals: sleep    Progress made toward(s) clinical / shift goals:  Pain controlled with scheduled medication, piedra dced, occult 2/2 blood sample needs collecting, able to participate in therapy.

## 2023-08-25 NOTE — THERAPY
"Occupational Therapy  Daily Treatment     Patient Name: Richard Hubbard II  Age:  48 y.o., Sex:  male  Medical Record #: [id number]  Today's Date: 8/25/2023     Precautions  Precautions: Fall Risk, Other (See Comments)  Comments: low BP, prothesis LLE, piedra         Subjective    \"I'm just tired from this week. I think it's all catching up to me.\"      Objective       08/25/23 1331   OT Charge Group   OT Self Care / ADL (Units) 4   OT Total Time Spent   OT Individual Total Time Spent (Mins) 60   Cognition    Level of Consciousness Alert   Functional Level of Assist   Grooming Modified Independent   Grooming Description Increased time;Seated in wheelchair at sink   Bathing Standby Assist   Bathing Description Grab bar;Hand held shower;Tub bench;Increased time;Initial preparation for task;Set-up of equipment;Supervision for safety   Upper Body Dressing Modified Independent   Upper Body Dressing Description Increased time   Lower Body Dressing Minimal Assist   Lower Body Dressing Description Grab bar;Increased time;Initial preparation for task;Set-up of equipment;Supervision for safety  (assist for donning shorts over waist in standing 2/2 fatigue; pt was able to thread catheter through shorts without assist)   Toilet Transfers Contact Guard Assist   Toilet Transfer Description Grab bar;Increased time;Set-up of equipment;Supervision for safety;Verbal cueing   Interdisciplinary Plan of Care Collaboration   Patient Position at End of Therapy Seated;Call Light within Reach;Tray Table within Reach;Phone within Reach         Assessment    Pt tolerated session well. Pt completed shower/dressing tasks at SPV-min a level. Pt required increased assist for LBD this session 2/2 fatigue after shower and being the last therapy session of the day. Pt demonstrated good ability to navigate catheter and thread in/out of pants without assist or cues. CGA for xfer's for safety. Pt reported not having met  this admission- " nurse notified and will let  know that pt would like to speak with them.     Strengths: Able to follow instructions, Alert and oriented, Effective communication skills, Independent prior level of function, Pleasant and cooperative, Supportive family, Willingly participates in therapeutic activities  Barriers: Decreased endurance, Fatigue, Generalized weakness, Impaired activity tolerance, Impaired balance    Plan    ADL's, IADL's at w/c level, standing bal/tolerance, strengthening/endurance, functional mobility    Occupational Therapy Goals (Active)       Problem: Functional Transfers       Dates: Start:  08/24/23         Goal: STG-Within one week, patient will transfer to tub/shower with supervision using w/c and tub txfr bench.        Dates: Start:  08/24/23               Problem: IADL's       Dates: Start:  08/24/23         Goal: STG-Within one week, patient will access kitchen area at supv level using AE as needed.        Dates: Start:  08/24/23               Problem: OT Long Term Goals       Dates: Start:  08/21/23         Goal: LTG-By discharge, patient will complete basic self care tasks with mod I-SPV       Dates: Start:  08/21/23            Goal: LTG-By discharge, patient will perform bathroom transfers with mod I-SPV       Dates: Start:  08/21/23            Goal: LTG-By discharge, patient will complete basic home management with mod I-SPV from w/c level        Dates: Start:  08/21/23

## 2023-08-25 NOTE — PROGRESS NOTES
NURSING DAILY NOTE    Name: Richard Hbubard II   Date of Admission: 8/20/2023   Admitting Diagnosis: Acute diastolic heart failure (HCC)  Attending Physician: Keaton Pisano M.d.  Allergies: Nsaids, Diphenhydramine, Mirtazapine, Penicillins, and Promethazine    Safety  Patient Assist  sba  Patient Precautions  Fall Risk  Precaution Comments  low BP, prothesis LLE, piedra  Bed Transfer Status  Contact Guard Assist  Toilet Transfer Status   Maximal Assist  Assistive Devices  Rails, Wheelchair  Oxygen  Silicone Nasal Cannula  Diet/Therapeutic Dining  Current Diet Order   Procedures    Diet Order Diet: Regular (For 8/23/23 breakfast, pt is requesting victor, eggs and pancakes.  Plus with all meals, he would like 2 cokes on his trays.  Thank you.)     Pill Administration  whole  Agitated Behavioral Scale     ABS Level of Severity       Fall Risk  Has the patient had a fall this admission?   No  Osiris Healy Fall Risk Scoring  17, HIGH RISK  Fall Risk Safety Measures  bed alarm, chair alarm, poor balance, and ok to leave pt in bathroom    Vitals  Temperature: 36.6 °C (97.8 °F)  Temp src: Oral  Pulse: 77  Respiration: 17  Blood Pressure: 94/50  Blood Pressure MAP (Calculated): 65 MM HG  BP Location: Right, Upper Arm  Patient BP Position: Supine     Oxygen  Pulse Oximetry: 93 %  O2 (LPM): 1  FiO2%: 21 %  O2 Delivery Device: Silicone Nasal Cannula    Bowel and Bladder  Last Bowel Movement  08/23/23  Stool Type  Type 3: Like a sausage, but with cracks on its surface  Bowel Device  Bathroom  Continent  Bladder: Continent void (Piedra catheter)   Bowel: Continent movement  Bladder Function  Urine Void (mL): 700 ml  Urine Color: Melonie  Genitourinary Assessment   Bladder Assessment (WDL):  WDL Except  Piedra Catheter: Present with Active Order  Piedra Reasons per MD Order: Acute urinary retention or bladder outlet obstruction  Urinary Elimination: Catheter  (Document on LDA)  Urine Color: Melonie  Bladder Device: Indwelling Catheter    Skin  Nam Score   18  Sensory Interventions      Moisture Interventions  Moisturizers/Barriers: Barrier Paste, Barrier Wipes      Pain  Pain Rating Scale  8 - Awful, hard to do anything  Pain Location  Generalized  Pain Location Orientation  Right, Left, Upper  Pain Interventions   Medication (see MAR)    ADLs    Bathing      Linen Change      Personal Hygiene     Chlorhexidine Bath      Oral Care     Teeth/Dentures  Broken Teeth (Comments)  Shave     Nutrition Percentage Eaten  *  * Meal *  *, Dinner, Between % Consumed  Environmental Precautions  Treaded Slipper Socks on Patient, Personal Belongings, Wastebasket, Call Bell etc. in Easy Reach, Transferred to Stronger Side, Report Given to Other Health Care Providers Regarding Fall Risk, Bed in Low Position, Communication Sign for Patients & Families, Mobility Assessed & Appropriate Sign Placed  Patient Turns/Positioning  Patient Turns Self from Side to Side  Patient Turns Assistance/Tolerance     Bed Positions  Bed Controls On, Bed Locked  Head of Bed Elevated  Self regulated      Psychosocial/Neurologic Assessment  Psychosocial Assessment  Psychosocial (WDL):  WDL Except  Patient Behaviors: Fatigue  Neurologic Assessment  Neuro (WDL): Within Defined Limits  Level of Consciousness: Alert  Orientation Level: Oriented X4  Cognition: Follows commands  Speech: Clear  Pupil Assesment: Yes  R Pupil Size (mm): 2  R Pupil Shape / Description: Round  R Pupil Reaction: Brisk  L Pupil Size (mm): 2  L Pupil Shape / Description: Round  L Pupil Reaction: Brisk  EENT (WDL):  WDL Except    Cardio/Pulmonary Assessment  Edema      Respiratory Breath Sounds  RUL Breath Sounds: Clear  RML Breath Sounds: Clear, Diminished  RLL Breath Sounds: Clear, Diminished  DONALD Breath Sounds: Clear  LLL Breath Sounds: Clear, Diminished  Cardiac Assessment   Cardiac (WDL):  WDL Except (CHF)

## 2023-08-25 NOTE — PROGRESS NOTES
NURSING DAILY NOTE    Name: Richard Hubbard II  Date of Admission: 8/20/2023  Admitting Diagnosis: Acute diastolic heart failure (HCC)  Attending Physician: Keaton Pisano M.d.  Allergies: Nsaids, Diphenhydramine, Mirtazapine, Penicillins, and Promethazine    Safety  Patient Assist  osba  Patient Precautions  oFall Risk  Precaution Comments  olow BP, prothesis LLE, piedra  Bed Transfer Status  oContact Guard Assist  Toilet Transfer Status  oMaximal Assist  Assistive Devices  oRails, Wheelchair  Oxygen  oNone - Room Air  Diet/Therapeutic Dining  o  Current Diet Order   Procedures    Diet Order Diet: Regular (For 8/23/23 breakfast, pt is requesting victor, eggs and pancakes.  Plus with all meals, he would like 2 cokes on his trays.  Thank you.)     Pill Administration  owhole  Agitated Behavioral Scale  o   ABS Level of Severity  o     Fall Risk  Has the patient had a fall this admission?  Keith  Osiris Healy Fall Risk Scoring  o17, HIGH RISK  Fall Risk Safety Measures  catherine alarm, chair alarm, and seatbelt alarm    Vitals  Temperature: 36.4 °C (97.6 °F)  Temp src: Oral  Pulse: 80  Respiration: 18  Blood Pressure: 93/51  Blood Pressure MAP (Calculated): 65 MM HG  BP Location: Right, Upper Arm  Patient BP Position: Supine    Oxygen  Pulse Oximetry: 98 %  O2 (LPM): 1  FiO2%: 21 %  O2 Delivery Device: None - Room Air    Bowel and Bladder  Last Bowel Movement  o08/23/23  Stool Type  oType 3: Like a sausage, but with cracks on its surface  Bowel Device  oBathroom  Continent  oBladder: Continent void (Piedra catheter)  oBowel: Continent movement  Bladder Function  oUrine Void (mL): 1200 ml  Urine Color: Yellow  Genitourinary Assessment  oBladder Assessment (WDL):  WDL Except  Piedra Catheter: Present with Active Order  Piedra Reasons per MD Order: Acute urinary retention or bladder outlet obstruction  Piedra Care: Given with Soap and Water  Urinary Elimination: Catheter (Document on LDA)  Urine Color:  Yellow  Bladder Device: Indwelling Catheter    Skin  Nam Score  o 18  Sensory Interventions  o Skin Preventative Measures: Pillows in Use for Support / Positioning  Moisture Interventions  oMoisturizers/Barriers: Barrier Paste, Barrier Wipes, Containment Devices  Containment Devices: Indwelling Catheter      Pain  Pain Rating Scale  o8 - Awful, hard to do anything  Pain Location  oGeneralized  Pain Location Orientation  oRight, Left, Upper  Pain Interventions  oMedication (see MAR)    ADLs    Bathing  o   Linen Change  o   Personal Hygiene  o   Chlorhexidine Bath  o   Oral Care  o   Teeth/Dentures  oBroken Teeth (Comments)  Shave  o   Nutrition Percentage Eaten  o*  * Meal *  *, Dinner, Between % Consumed  Environmental Precautions  oPersonal Belongings, Wastebasket, Call Bell etc. in Easy Reach, Bed in Low Position  Patient Turns/Positioning  oPatient Turns Self from Side to Side  Patient Turns Assistance/Tolerance  o   Bed Positions  Shannan Controls On, Bed Locked  Head of Bed Elevated  oSelf regulated      Psychosocial/Neurologic Assessment  Psychosocial Assessment  oPsychosocial (WDL):  WDL Except  Patient Behaviors: Fatigue  Neurologic Assessment  oNeuro (WDL): Within Defined Limits  Level of Consciousness: Alert  Orientation Level: Oriented X4  Cognition: Follows commands  Speech: Clear  Pupil Assesment: Yes  R Pupil Size (mm): 2  R Pupil Shape / Description: Round  R Pupil Reaction: Brisk  L Pupil Size (mm): 2  L Pupil Shape / Description: Round  L Pupil Reaction: Brisk  oEENT (WDL):  WDL Except    Cardio/Pulmonary Assessment  Edema  o   Respiratory Breath Sounds  oRUL Breath Sounds: Clear  RML Breath Sounds: Clear, Diminished  RLL Breath Sounds: Clear, Diminished  DONALD Breath Sounds: Clear  LLL Breath Sounds: Clear, Diminished  Cardiac Assessment  oCardiac (WDL):  WDL Except

## 2023-08-25 NOTE — THERAPY
Occupational Therapy  Daily Treatment     Patient Name: Richard Hubbard II  Age:  48 y.o., Sex:  male  Medical Record #: [id number]  Today's Date: 8/25/2023     Precautions  Precautions: Fall Risk, Other (See Comments)  Comments: low BP, prothesis LLE, piedra         Subjective    Pt agreeable to OT session.      Objective       08/25/23 1101   OT Charge Group   OT Therapeutic Exercise (Units) 2   OT Total Time Spent   OT Individual Total Time Spent (Mins) 30   Precautions   Precautions Fall Risk;Other (See Comments)   Comments low BP, prothesis LLE, piedra   Cognition    Level of Consciousness Alert   Sitting Upper Body Exercises   Tricep Press 3 sets of 15;Bilateral;Weight (See Comments for lbs)  (Rickshaw facing bwd with 30#)   Supine Lower Body Exercise   Other Exercises Shuttle for RLE strengthening with 5 bands, 5x15 reps; CGA for txfr from w/c<>shuttle   Interdisciplinary Plan of Care Collaboration   Patient Position at End of Therapy Seated;Self Releasing Lap Belt Applied;Call Light within Reach;Tray Table within Reach;Phone within Reach         Assessment    Pt con't to progress with strength, endurance and functional transfers. He con't to require rest breaks and cues for pacing.   Strengths: Able to follow instructions, Alert and oriented, Effective communication skills, Independent prior level of function, Pleasant and cooperative, Supportive family, Willingly participates in therapeutic activities  Barriers: Decreased endurance, Fatigue, Generalized weakness, Impaired activity tolerance, Impaired balance    Plan    ADL's, IADL's at w/c level, standing bal/tolerance, strengthening/endurance, functional mobility     Occupational Therapy Goals (Active)       Problem: Functional Transfers       Dates: Start:  08/24/23         Goal: STG-Within one week, patient will transfer to tub/shower with supervision using w/c and tub txfr bench.        Dates: Start:  08/24/23               Problem: IADL's       Dates:  Start:  08/24/23         Goal: STG-Within one week, patient will access kitchen area at supv level using AE as needed.        Dates: Start:  08/24/23               Problem: OT Long Term Goals       Dates: Start:  08/21/23         Goal: LTG-By discharge, patient will complete basic self care tasks with mod I-SPV       Dates: Start:  08/21/23            Goal: LTG-By discharge, patient will perform bathroom transfers with mod I-SPV       Dates: Start:  08/21/23            Goal: LTG-By discharge, patient will complete basic home management with mod I-SPV from w/c level        Dates: Start:  08/21/23

## 2023-08-25 NOTE — PROGRESS NOTES
Physical Medicine & Rehabilitation Progress Note    Encounter Date: 8/25/2023    Chief Complaint: Decreased mobility, weakness    Interval Events (Subjective):  Patient doing well overall.  Received report from nurse that he has some right-sided abdominal/chest pain.  Patient examined in has some point tenderness over his right lateral chest over his ribs.  Patient has a systolic ejection murmur, which is known to the patient.  No signs of abdominal distention or bruising.  I suspect this is musculoskeletal in nature.  We discussed adding a lidocaine patch, however patient states the pain is manageable without analgesics    _____________________________________  Interdisciplinary Team Conference   Most recent IDT on 8/24/2023    Nursing:  Diet Current Diet Order   Procedures    Diet Order Diet: Regular (For 8/23/23 breakfast, pt is requesting victor, eggs and pancakes.  Plus with all meals, he would like 2 cokes on his trays.  Thank you.)       Eating ADL Independent      % of Last Meal  Oral Nutrition: Breakfast, Between 50-75% Consumed   Sleep    Bowel Last BM: 08/23/23   Bladder Rivera   Barriers to Discharge Home:  FOBT x2  Oxygen at night     Physical Therapy:  Bed Mobility    Transfers Contact Guard Assist  Increased time, Supervision for safety, Verbal cueing, Initial preparation for task   Mobility Contact Guard Assist   Stairs Adore   Barriers to Discharge Home:  Off loading shoe new one    Occupational Therapy:  Grooming Modified Independent, Seated   Bathing Standby Assist   UB Dressing Supervision   LB Dressing Contact Guard Assist   Toileting Maximal Assist   Shower & Transfer CGA   Barriers to Discharge Home:  General debility    Respiratory Therapy:  O2 (LPM): 1  O2 Delivery Device: Silicone Nasal Cannula    Case Management:  Continues to work on disposition and DME needs.      Discharge Date/Disposition:  9/4/23  _____________________________________      Objective:  VITAL SIGNS: /55   Pulse 79  "  Temp 36.8 °C (98.2 °F) (Oral)   Resp 18   Ht 1.803 m (5' 11\")   Wt 87 kg (191 lb 12.8 oz)   SpO2 97%   BMI 26.75 kg/m²   Gen: NAD  Psych: Mood and affect appropriate  CV: RRR, 0 edema  Resp: CTAB, no upper airway sounds  Abd: NTND  Neuro: AOx4, following commands  MSK: Normal ROM R Knee  Unchanged from 8/23/23    Laboratory Values:  Recent Results (from the past 72 hour(s))   CBC WITH DIFFERENTIAL    Collection Time: 08/24/23  5:25 AM   Result Value Ref Range    WBC 7.2 4.8 - 10.8 K/uL    RBC 3.58 (L) 4.70 - 6.10 M/uL    Hemoglobin 9.7 (L) 14.0 - 18.0 g/dL    Hematocrit 31.5 (L) 42.0 - 52.0 %    MCV 88.0 81.4 - 97.8 fL    MCH 27.1 27.0 - 33.0 pg    MCHC 30.8 (L) 32.3 - 36.5 g/dL    RDW 49.2 35.9 - 50.0 fL    Platelet Count 189 164 - 446 K/uL    MPV 9.9 9.0 - 12.9 fL    Neutrophils-Polys 52.60 44.00 - 72.00 %    Lymphocytes 30.20 22.00 - 41.00 %    Monocytes 12.30 0.00 - 13.40 %    Eosinophils 3.20 0.00 - 6.90 %    Basophils 1.00 0.00 - 1.80 %    Immature Granulocytes 0.70 0.00 - 0.90 %    Nucleated RBC 0.00 0.00 - 0.20 /100 WBC    Neutrophils (Absolute) 3.81 1.82 - 7.42 K/uL    Lymphs (Absolute) 2.19 1.00 - 4.80 K/uL    Monos (Absolute) 0.89 (H) 0.00 - 0.85 K/uL    Eos (Absolute) 0.23 0.00 - 0.51 K/uL    Baso (Absolute) 0.07 0.00 - 0.12 K/uL    Immature Granulocytes (abs) 0.05 0.00 - 0.11 K/uL    NRBC (Absolute) 0.00 K/uL   Basic Metabolic Panel    Collection Time: 08/24/23  5:25 AM   Result Value Ref Range    Sodium 136 135 - 145 mmol/L    Potassium 4.2 3.6 - 5.5 mmol/L    Chloride 102 96 - 112 mmol/L    Co2 27 20 - 33 mmol/L    Glucose 80 65 - 99 mg/dL    Bun 18 8 - 22 mg/dL    Creatinine 0.90 0.50 - 1.40 mg/dL    Calcium 8.4 (L) 8.5 - 10.5 mg/dL    Anion Gap 7.0 7.0 - 16.0   MAGNESIUM    Collection Time: 08/24/23  5:25 AM   Result Value Ref Range    Magnesium 2.1 1.5 - 2.5 mg/dL   ESTIMATED GFR    Collection Time: 08/24/23  5:25 AM   Result Value Ref Range    GFR (CKD-EPI) 106 >60 mL/min/1.73 m 2   CBC " WITHOUT DIFFERENTIAL    Collection Time: 08/25/23  6:21 AM   Result Value Ref Range    WBC 6.7 4.8 - 10.8 K/uL    RBC 3.42 (L) 4.70 - 6.10 M/uL    Hemoglobin 9.5 (L) 14.0 - 18.0 g/dL    Hematocrit 29.8 (L) 42.0 - 52.0 %    MCV 87.1 81.4 - 97.8 fL    MCH 27.8 27.0 - 33.0 pg    MCHC 31.9 (L) 32.3 - 36.5 g/dL    RDW 49.1 35.9 - 50.0 fL    Platelet Count 194 164 - 446 K/uL    MPV 10.1 9.0 - 12.9 fL   proBrain Natriuretic Peptide, NT    Collection Time: 08/25/23  6:21 AM   Result Value Ref Range    NT-proBNP 4093 (H) 0 - 125 pg/mL   FERRITIN    Collection Time: 08/25/23  6:21 AM   Result Value Ref Range    Ferritin 289.0 22.0 - 322.0 ng/mL   IRON/TOTAL IRON BIND    Collection Time: 08/25/23  6:21 AM   Result Value Ref Range    Iron 32 (L) 50 - 180 ug/dL    Total Iron Binding 259 250 - 450 ug/dL    Unsat Iron Binding 227 110 - 370 ug/dL    % Saturation 12 (L) 15 - 55 %   VITAMIN B12    Collection Time: 08/25/23  6:21 AM   Result Value Ref Range    Vitamin B12 -True Cobalamin 326 211 - 911 pg/mL       Medications:  Scheduled Medications   Medication Dose Frequency    ferrous sulfate  325 mg BID WITH MEALS    pregabalin  100 mg TID    vitamin D3  1,000 Units DAILY    torsemide  20 mg Q DAY    Pharmacy Consult Request  1 Each PHARMACY TO DOSE    senna-docusate  2 Tablet BID    omeprazole  20 mg DAILY    dapagliflozin propanediol  10 mg DAILY    DULoxetine  60 mg DAILY    morphine  30 mg 4X/DAY    morphine ER  15 mg BID    potassium chloride SA  10 mEq DAILY    melatonin  3 mg QHS     PRN medications: zolpidem, Respiratory Therapy Consult, senna-docusate **AND** polyethylene glycol/lytes **AND** magnesium hydroxide **AND** bisacodyl, mag hydrox-al hydrox-simeth, sodium chloride, acetaminophen, naloxone    Diet:  Current Diet Order   Procedures    Diet Order Diet: Regular (For 8/23/23 breakfast, pt is requesting victor, eggs and pancakes.  Plus with all meals, he would like 2 cokes on his trays.  Thank you.)       Medical  Decision Making and Plan:  CHF exacerbation  - Originally presented with shortness of breath and pulmonary edema as well as cardiorenal syndrome  -While at [facility], patient was on IV Lasix for diuresis  - Cardiology was consulted at [facility] for patient's known aortic and mitral regurgitation, EF 55%  - Patient has been transferred on torsemide 40 mg daily, discussed new medication with [facility] hospitalist Dr. Wynne.  - Consult hospitalist  - Continue farxiga   -PT and OT for mobility and ADLs. Per guidelines, 15 hours per week between PT, OT and/or SLP.  -Follow-up with cardiology     Hypokalemia  - Transferred on potassium 10 mg daily  - Follow-up admission labs in the morning     Anxiety depression  - Continue Cymbalta     Anemia  - worse on 8/24/23, 9.7 from 11.3. Will hold heparin as history of GI bleeds    Insomnia  - Avoiding trazodone, patient had prolonged QTc from prior EKG  - New EKG ordered to evaluate QTc  - Replace standing as needed trazodone for melatonin. Poor improvement with melatonin. Will trial Ambien as has tolerated in the past.      Rheumatoid arthritis  - Is on Humira q. 2 weeks  - Last dose 8/16  -Has previously been on steroids, has not been transferred to [facility] rehab on steroids, may consider restarting if patient has an RA flare     History of left BKA  - Previously at rehab in March/April 2023  - Monitor for edema control and residual limb for proper fit with prosthesis, wife will be bringing left BKA prosthesis     Chronic pain  - Chronically followed by pain management  - Is on home dose morphine 30 mg 4 times a day as well as MS Contin 15 mg twice daily  - Do not anticipate patient will need increased as needed pain medications  - Naloxone as needed narcotics overdose. Start Lyrica 100 mg TID. Continue Lyrica     Vitamin D deficiency  21 on admission.  - Start 1000 U     Bladder   - Miguel Frias'boogie 8/25/2023  - Start bladder scan protocol     Skin - Patient at risk for  skin breakdown due to debility in areas including sacrum, achilles, elbows and head in addition to other sites. Nursing to assess skin daily.      GI Ppx - Patient on Prilosec for GERD prophylaxis. Patient on Senna-docusate for constipation prophylaxis.      DVT Ppx - heparin on transfer. Discontinue Heparin   ____________________________________    Jed Pascual DO MS  [facility] - Physical Medicine and Rehabilitation     ____________________________________    Total time:  >35 minutes. Time spent included pre-rounding review of vitals and tests, unit/floor time, face-to-face time with the patient including physical examination, care coordination, counseling of patient and/or family, ordering medications/procedures/tests, discussion with CM, PT, OT, SLP and/or other healthcare providers, and documentation in the electronic medical record. Topics discussed included discharge planning, continue lyrica ambien for sleep, and new anemia. Patient was discussed separately in IDT today; please see details above.

## 2023-08-25 NOTE — PROGRESS NOTES
[facility] Medicine Daily Progress Note    Date of Service  8/25/2023      Chief Complaint:  CHF with vol overload    Interval History:  Discussed about stopping his Heparin 2nd to hx of bleeding ulcer.    Review of Systems  Review of Systems   Constitutional:  Negative for chills and fever.   Respiratory:  Negative for shortness of breath.    Cardiovascular:  Negative for chest pain.   Gastrointestinal:  Negative for abdominal pain, diarrhea, nausea and vomiting.   Psychiatric/Behavioral:  The patient is not nervous/anxious.         Physical Exam  Temp:  [36.4 °C (97.6 °F)-36.8 °C (98.2 °F)] 36.8 °C (98.2 °F)  Pulse:  [76-80] 79  Resp:  [18] 18  BP: ()/(51-55) 102/55  SpO2:  [97 %-98 %] 97 %    Physical Exam  Vitals and nursing note reviewed.   Constitutional:       Appearance: Normal appearance.   HENT:      Head: Atraumatic.   Eyes:      Conjunctiva/sclera: Conjunctivae normal.      Pupils: Pupils are equal, round, and reactive to light.   Cardiovascular:      Rate and Rhythm: Normal rate and regular rhythm.   Pulmonary:      Effort: Pulmonary effort is normal.      Breath sounds: Normal breath sounds.   Abdominal:      General: Bowel sounds are normal.      Palpations: Abdomen is soft.   Musculoskeletal:      Cervical back: Normal range of motion and neck supple.      Right lower leg: No edema.      Left lower leg: No edema.      Comments: Has left BKA.   Skin:     General: Skin is warm and dry.   Neurological:      Mental Status: He is alert and oriented to person, place, and time.   Psychiatric:         Mood and Affect: Mood normal.         Behavior: Behavior normal.         Fluids    Intake/Output Summary (Last 24 hours) at 8/25/2023 1052  Last data filed at 8/25/2023 0900  Gross per 24 hour   Intake 800 ml   Output 4100 ml   Net -3300 ml         Laboratory  Recent Labs     08/24/23  0525 08/25/23  0621   WBC 7.2 6.7   RBC 3.58* 3.42*   HEMOGLOBIN 9.7* 9.5*   HEMATOCRIT 31.5* 29.8*   MCV 88.0 87.1   MCH  27.1 27.8   MCHC 30.8* 31.9*   RDW 49.2 49.1   PLATELETCT 189 194   MPV 9.9 10.1       Recent Labs     08/24/23  0525   SODIUM 136   POTASSIUM 4.2   CHLORIDE 102   CO2 27   GLUCOSE 80   BUN 18   CREATININE 0.90   CALCIUM 8.4*                     Imaging    Assessment/Plan  Anemia  Assessment & Plan  HB: 11.3 (8/21) --> 9.7 (8/24) --> 9.5 (8/25)  Fe: 32, sats 12%, ferritin wnl  B12: 326  F/U SFOB  Off Proph Heparin  Will give Fe supplements  Note: has a hx of bleeding gastric ulcer  Cont to monitor    Acute on chronic heart failure with preserved ejection fraction (HFpEF) ([facility])  Assessment & Plan  Echo: EF 55%, RVSP 50, mod MR/AS/AR  BNP: 1220 (3/15) --> 25,906 (8/12) --> 9686 (8/16) --> 4093 (8/25)  [place] [facility]  Cont Farxiga  Cont Demadex  Cont K+ supplements  K+: 4.2 (8/24)  Cont to monitor    S/P BKA (below knee amputation) unilateral, left ([facility])- (present on admission)  Assessment & Plan  Ambulates with prosthesis    Vitamin D deficiency- (present on admission)  Assessment & Plan  Vit D: 21  Cont supplements    Chronic pain syndrome- (present on admission)  Assessment & Plan  Management per Physiatry    History of atrial fibrillation ( single episode 2019)- (present on admission)  Assessment & Plan  HR ok  Cont Demadex  Note: had cardioversion in 2019  Note: has hx of bleeding ulcer  Cont to monitor    Rheumatoid arthritis ([facility])- (present on admission)  Assessment & Plan  Chronically on Humira

## 2023-08-26 NOTE — PROGRESS NOTES
NURSING DAILY NOTE    Name: Richard Hubbard II   Date of Admission: 8/20/2023   Admitting Diagnosis: Acute diastolic heart failure (HCC)  Attending Physician: Keaton Pisano M.d.  Allergies: Nsaids, Diphenhydramine, Mirtazapine, Penicillins, and Promethazine    Safety  Patient Assist  sba  Patient Precautions  Fall Risk, Other (See Comments)  Precaution Comments  low BP, prothesis LLE, piedra  Bed Transfer Status  Minimal Assist (squat-pivot tx from bed to wc to pt's L with Min A)  Toilet Transfer Status   Contact Guard Assist  Assistive Devices  Rails, Wheelchair  Oxygen  Silicone Nasal Cannula (nc placed on patient 1.5L 02 HS)  Diet/Therapeutic Dining  Current Diet Order   Procedures    Diet Order Diet: Regular (For 8/23/23 breakfast, pt is requesting victor, eggs and pancakes.  Plus with all meals, he would like 2 cokes on his trays.  Thank you.)     Pill Administration  whole  Agitated Behavioral Scale     ABS Level of Severity       Fall Risk  Has the patient had a fall this admission?   No  Osiris Healy Fall Risk Scoring  17, HIGH RISK  Fall Risk Safety Measures  bed alarm, chair alarm, and poor balance    Vitals  Temperature: 36.9 °C (98.4 °F)  Temp src: Oral  Pulse: 70  Respiration: 18  Blood Pressure: 121/60  Blood Pressure MAP (Calculated): 80 MM HG  BP Location: Left, Upper Arm  Patient BP Position: Sitting     Oxygen  Pulse Oximetry: 98 %  O2 (LPM): 1.5  FiO2%: 21 %  O2 Delivery Device: Silicone Nasal Cannula (nc placed on patient 1.5L 02 HS)    Bowel and Bladder  Last Bowel Movement  08/23/23  Stool Type  Type 3: Like a sausage, but with cracks on its surface  Bowel Device  Bathroom  Continent  Bladder: Continent void (Piedra catheter)   Bowel: Continent movement  Bladder Function  Urine Void (mL): 1200 ml  Urine Color: Yellow  Genitourinary Assessment   Bladder Assessment (WDL):  WDL Except  Piedra Catheter: Present with Active  Order  Rivera Reasons per MD Order: Acute urinary retention or bladder outlet obstruction  Rivera Care: Given with Soap and Water  Urinary Elimination: Catheter (Document on LDA)  Urine Color: Yellow  Bladder Device: Indwelling Catheter    Skin  Nam Score   18  Sensory Interventions   Skin Preventative Measures: Pillows in Use for Support / Positioning  Moisture Interventions  Moisturizers/Barriers: Barrier Paste  Containment Devices: Indwelling Catheter      Pain  Pain Rating Scale  0 - No Pain  Pain Location  Generalized  Pain Location Orientation  Right, Left, Upper  Pain Interventions   Medication (see MAR)    ADLs    Bathing      Linen Change      Personal Hygiene     Chlorhexidine Bath      Oral Care     Teeth/Dentures  Broken Teeth (Comments)  Shave     Nutrition Percentage Eaten  Lunch, Between % Consumed  Environmental Precautions  Personal Belongings, Wastebasket, Call Bell etc. in Easy Reach, Bed in Low Position  Patient Turns/Positioning  Patient Turns Self from Side to Side  Patient Turns Assistance/Tolerance     Bed Positions  Bed Controls On, Bed Locked  Head of Bed Elevated  Self regulated      Psychosocial/Neurologic Assessment  Psychosocial Assessment  Psychosocial (WDL):  WDL Except  Patient Behaviors: Fatigue  Neurologic Assessment  Neuro (WDL): Within Defined Limits  Level of Consciousness: Alert  Orientation Level: Oriented X4  Cognition: Follows commands  Speech: Clear  Pupil Assesment: Yes  R Pupil Size (mm): 2  R Pupil Shape / Description: Round  R Pupil Reaction: Brisk  L Pupil Size (mm): 2  L Pupil Shape / Description: Round  L Pupil Reaction: Brisk  Motor Function/Sensation Assessment: Sensation (in all limbs)  EENT (WDL):  WDL Except    Cardio/Pulmonary Assessment  Edema      Respiratory Breath Sounds  RUL Breath Sounds: Clear  RML Breath Sounds: Clear, Diminished  RLL Breath Sounds: Clear, Diminished  DONALD Breath Sounds: Clear  LLL Breath Sounds: Clear, Diminished  Cardiac  Assessment   Cardiac (WDL):  WDL Except (hx: acute diastolic HF)

## 2023-08-26 NOTE — PROGRESS NOTES
NURSING DAILY NOTE    Name: Richard Hubbard II   Date of Admission: 8/20/2023   Admitting Diagnosis: Acute diastolic heart failure (HCC)  Attending Physician: Keaton Pisano M.d.  Allergies: Nsaids, Diphenhydramine, Mirtazapine, Penicillins, and Promethazine    Safety  Patient Assist  sba  Patient Precautions  Fall Risk, Other (See Comments)  Precaution Comments  low BP, prothesis LLE, piedra  Bed Transfer Status  Minimal Assist (squat-pivot tx from bed to wc to pt's L with Min A)  Toilet Transfer Status   Contact Guard Assist  Assistive Devices  Rails, Wheelchair  Oxygen  None - Room Air  Diet/Therapeutic Dining  Current Diet Order   Procedures    Diet Order Diet: Regular (For 8/23/23 breakfast, pt is requesting victor, eggs and pancakes.  Plus with all meals, he would like 2 cokes on his trays.  Thank you.)     Pill Administration  whole  Agitated Behavioral Scale     ABS Level of Severity       Fall Risk  Has the patient had a fall this admission?   No  Osiris Healy Fall Risk Scoring  17, HIGH RISK  Fall Risk Safety Measures  bed alarm, chair alarm, and poor balance    Vitals  Temperature: 37.1 °C (98.7 °F)  Temp src: Oral  Pulse: 82  Respiration: 18  Blood Pressure: 112/61  Blood Pressure MAP (Calculated): 78 MM HG  BP Location: Right, Upper Arm  Patient BP Position: Sitting     Oxygen  Pulse Oximetry: 98 %  O2 (LPM): 1.5  FiO2%: 21 %  O2 Delivery Device: None - Room Air    Bowel and Bladder  Last Bowel Movement  08/23/23  Stool Type  Type 3: Like a sausage, but with cracks on its surface  Bowel Device  Bathroom  Continent  Bladder: Continent void (Piedra catheter)   Bowel: Continent movement  Bladder Function  Urine Void (mL): 600 ml  Number of Times Voided: 1  Urine Color: Yellow  Genitourinary Assessment   Bladder Assessment (WDL):  WDL Except  Piedra Catheter: Present with Active Order  Piedra Reasons per MD Order: Acute urinary retention or  bladder outlet obstruction  Rivera Care: Given with Soap and Water  Urinary Elimination: Catheter (Document on LDA)  Urine Color: Yellow  Bladder Device: Indwelling Catheter    Skin  Nam Score   18  Sensory Interventions   Skin Preventative Measures: Pillows in Use for Support / Positioning  Moisture Interventions  Moisturizers/Barriers: Barrier Paste  Containment Devices: Indwelling Catheter      Pain  Pain Rating Scale  1 - Hardly Notice Pain  Pain Location  Generalized  Pain Location Orientation  Right, Left, Upper  Pain Interventions   [place]    ADLs    Bathing      Linen Change      Personal Hygiene     Chlorhexidine Bath      Oral Care     Teeth/Dentures  Broken Teeth (Comments)  Shave     Nutrition Percentage Eaten  Lunch, Between % Consumed  Environmental Precautions  Personal Belongings, Wastebasket, Call Bell etc. in Easy Reach, Bed in Low Position  Patient Turns/Positioning  Patient Turns Self from Side to Side  Patient Turns Assistance/Tolerance     Bed Positions  Bed Controls On, Bed Locked  Head of Bed Elevated  Self regulated      Psychosocial/Neurologic Assessment  Psychosocial Assessment  Psychosocial (WDL):  WDL Except  Patient Behaviors: Fatigue  Neurologic Assessment  Neuro (WDL): Within Defined Limits  Level of Consciousness: Alert  Orientation Level: Oriented X4  Cognition: Follows commands  Speech: Clear  Pupil Assesment: Yes  R Pupil Size (mm): 2  R Pupil Shape / Description: Round  R Pupil Reaction: Brisk  L Pupil Size (mm): 2  L Pupil Shape / Description: Round  L Pupil Reaction: Brisk  Motor Function/Sensation Assessment: Sensation (in all limbs)  EENT (WDL):  WDL Except    Cardio/Pulmonary Assessment  Edema      Respiratory Breath Sounds  RUL Breath Sounds: Clear  RML Breath Sounds: Clear, Diminished  RLL Breath Sounds: Clear, Diminished  DONALD Breath Sounds: Clear  LLL Breath Sounds: Clear, Diminished  Cardiac Assessment   Cardiac (WDL):  WDL Except (hx: acute diastolic  HF)

## 2023-08-26 NOTE — FLOWSHEET NOTE
08/26/23 1355   Events/Summary/Plan   Events/Summary/Plan SVN given   Vital Signs   Pulse 81  (post 83)   Respiration 20  (post 20)   Pulse Oximetry 96 %   $ Pulse Oximetry (Spot Check) Yes   Respiratory Assessment   Level of Consciousness Alert   Chest Exam   Work Of Breathing / Effort Within Normal Limits   Breath Sounds   RUL Breath Sounds Fine Crackles  (increased aeration T/O)   RML Breath Sounds Fine Crackles   RLL Breath Sounds Fine Crackles   DONALD Breath Sounds Fine Crackles   LLL Breath Sounds Fine Crackles   Secretions   Cough Productive   How Sputum Obtained Expectorated;Spontaneous   Sputum Amount Scant   Sputum Color Clear   Sputum Consistency Thin   Oxygen   O2 Delivery Device Room air w/o2 available

## 2023-08-26 NOTE — PROGRESS NOTES
[facility] Medicine Daily Progress Note    Date of Service  8/26/2023      Chief Complaint:  CHF with vol overload    Interval History:  Discusses about his BP being lower.  PT is on Demadex but hasn't got it for a while -- will stop and observe.    Review of Systems  Review of Systems   Constitutional:  Negative for fever.   Eyes:  Negative for blurred vision.   Respiratory:  Negative for shortness of breath.    Cardiovascular:  Negative for palpitations.   Gastrointestinal:  Negative for nausea and vomiting.   Neurological:  Negative for dizziness and headaches.   Psychiatric/Behavioral:  Negative for hallucinations.         Physical Exam  Temp:  [36.6 °C (97.8 °F)-37.1 °C (98.7 °F)] 36.6 °C (97.8 °F)  Pulse:  [70-83] 83  Resp:  [18] 18  BP: ()/(52-66) 96/66  SpO2:  [95 %-98 %] 95 %    Physical Exam  Vitals and nursing note reviewed.   Constitutional:       General: He is not in acute distress.  HENT:      Mouth/Throat:      Mouth: Mucous membranes are moist.      Pharynx: Oropharynx is clear.   Eyes:      General: No scleral icterus.  Cardiovascular:      Rate and Rhythm: Normal rate and regular rhythm.   Pulmonary:      Effort: Pulmonary effort is normal.      Breath sounds: No wheezing or rales.   Abdominal:      General: Bowel sounds are normal.      Palpations: Abdomen is soft.   Musculoskeletal:      Cervical back: No rigidity.      Right lower leg: No edema.      Left lower leg: No edema.      Comments: Has left BKA.   Skin:     General: Skin is warm and dry.   Neurological:      Mental Status: He is alert and oriented to person, place, and time.   Psychiatric:         Mood and Affect: Mood normal.         Behavior: Behavior normal.         Fluids    Intake/Output Summary (Last 24 hours) at 8/26/2023 0957  Last data filed at 8/26/2023 0111  Gross per 24 hour   Intake 360 ml   Output 1450 ml   Net -1090 ml         Laboratory  Recent Labs     08/24/23  0525 08/25/23  0621   WBC 7.2 6.7   RBC 3.58* 3.42*    HEMOGLOBIN 9.7* 9.5*   HEMATOCRIT 31.5* 29.8*   MCV 88.0 87.1   MCH 27.1 27.8   MCHC 30.8* 31.9*   RDW 49.2 49.1   PLATELETCT 189 194   MPV 9.9 10.1       Recent Labs     08/24/23  0525   SODIUM 136   POTASSIUM 4.2   CHLORIDE 102   CO2 27   GLUCOSE 80   BUN 18   CREATININE 0.90   CALCIUM 8.4*                     Imaging    Assessment/Plan  Anemia  Assessment & Plan  HB: 11.3 (8/21) --> 9.7 (8/24) --> 9.5 (8/25)  Fe: 32, sats 12%, ferritin wnl  B12: 326  F/U SFOB  Off Proph Heparin  Cont Fe supplements  Note: has a hx of bleeding gastric ulcer  Cont to monitor    Acute on chronic heart failure with preserved ejection fraction (HFpEF) (Formerly [facility])  Assessment & Plan  Echo: EF 55%, RVSP 50, mod MR/AS/AR  BNP: 1220 (3/15) --> 25,906 (8/12) --> 9686 (8/16) --> 4093 (8/25)  S/P IV Lasix at [facility]  Cont Farxiga  Cont Demadex --> will decrease dose (2nd to lower BP)  Cont K+ supplements  K+: 4.2 (8/24)  Cont to monitor    S/P BKA (below knee amputation) unilateral, left (HCC)- (present on admission)  Assessment & Plan  Ambulates with prosthesis    Vitamin D deficiency- (present on admission)  Assessment & Plan  Vit D: 21  Cont supplements    Chronic pain syndrome- (present on admission)  Assessment & Plan  Management per Physiatry    History of atrial fibrillation ( single episode 2019)- (present on admission)  Assessment & Plan  HR ok  Cont Demadex  Note: had cardioversion in 2019  Note: has hx of bleeding ulcer  Cont to monitor    Rheumatoid arthritis (HCC)- (present on admission)  Assessment & Plan  Chronically on Humira

## 2023-08-26 NOTE — CARE PLAN
"  Problem: Pain - Standard  Goal: Alleviation of pain or a reduction in pain to the patient’s comfort goal  Outcome: Progressing  Note: Assessed for pain and discomfort , pain under control[ se mar] . Cont monitored.     Problem: Fall Risk - Rehab  Goal: Patient will remain free from falls  Outcome: Progressing  Note: Osiris Healy Fall risk Assessment Score: 17    High fall risk Interventions   - Alarming seatbelt  - Bed and strip alarm   - Yellow sign by the door   - Yellow wrist band \"Fall risk\"  - Room near to the nurse station  - Do not leave patient unattended in the bathroom  - Fall risk education provided       The patient is Stable - Low risk of patient condition declining or worsening    Shift Goals  Clinical Goals: Safety, pain control  Patient Goals: Safety  Family Goals: Education    Progress made toward(s) clinical / shift goals:  Pt free from fall and injury,    "

## 2023-08-26 NOTE — FLOWSHEET NOTE
08/26/23 1355   Events/Summary/Plan   Events/Summary/Plan SVN given   Vital Signs   Pulse 81   Respiration 20   Pulse Oximetry 96 %   $ Pulse Oximetry (Spot Check) Yes   Respiratory Assessment   Level of Consciousness Alert   Chest Exam   Work Of Breathing / Effort Within Normal Limits   Breath Sounds   RUL Breath Sounds Fine Crackles   RML Breath Sounds Fine Crackles   RLL Breath Sounds Fine Crackles   DONALD Breath Sounds Fine Crackles   LLL Breath Sounds Fine Crackles   Secretions   Cough Productive   How Sputum Obtained Expectorated;Spontaneous   Sputum Amount Scant   Sputum Color Clear   Sputum Consistency Thin   Oxygen   O2 Delivery Device Room air w/o2 available

## 2023-08-27 NOTE — CARE PLAN
The patient is Stable - Low risk of patient condition declining or worsening    Shift Goals  Clinical Goals: safety, pain  Patient Goals: safety, pain  Family Goals: Education    Progress made toward(s) clinical / shift goals:      Problem: Pain - Standard  Goal: Alleviation of pain or a reduction in pain to the patient’s comfort goal  Outcome: Progressing     Problem: Psychosocial  Goal: Patient's level of anxiety will decrease  Outcome: Progressing     Problem: Psychosocial  Goal: Patient's ability to verbalize feelings about condition will improve  Outcome: Progressing     Problem: Skin Integrity  Goal: Patient's skin integrity will be maintained or improve  Outcome: Progressing       Patient is not progressing towards the following goals:none

## 2023-08-27 NOTE — PROGRESS NOTES
NURSING DAILY NOTE    Name: Richard Hubbard II   Date of Admission: 8/20/2023   Admitting Diagnosis: Acute diastolic heart failure (HCC)  Attending Physician: Keaton Pisano M.d.  Allergies: Nsaids, Diphenhydramine, Mirtazapine, Penicillins, and Promethazine    Safety  Patient Assist  sba  Patient Precautions  Fall Risk, Other (See Comments)  Precaution Comments  low BP, prothesis LLE, piedra  Bed Transfer Status  Minimal Assist (squat-pivot tx from bed to wc to pt's L with Min A)  Toilet Transfer Status   Contact Guard Assist  Assistive Devices  Rails, Wheelchair  Oxygen  Room air w/o2 available  Diet/Therapeutic Dining  Current Diet Order   Procedures    Diet Order Diet: Regular (For 8/23/23 breakfast, pt is requesting victor, eggs and pancakes.  Plus with all meals, he would like 2 cokes on his trays.  Thank you.)     Pill Administration  whole  Agitated Behavioral Scale     ABS Level of Severity       Fall Risk  Has the patient had a fall this admission?   No  Osiris Healy Fall Risk Scoring  17, HIGH RISK  Fall Risk Safety Measures  ok to leave pt in bathroom    Vitals  Temperature: 36.7 °C (98 °F)  Temp src: Oral  Pulse: 81 (post 83)  Respiration: 20 (post 20)  Blood Pressure: 110/65  Blood Pressure MAP (Calculated): 80 MM HG  BP Location: Left, Upper Arm  Patient BP Position: Supine     Oxygen  Pulse Oximetry: 96 %  O2 (LPM): 1.5  FiO2%: 21 %  O2 Delivery Device: Room air w/o2 available    Bowel and Bladder  Last Bowel Movement  08/23/23  Stool Type  Type 3: Like a sausage, but with cracks on its surface  Bowel Device  Bathroom  Continent  Bladder: Continent void (Piedra catheter)   Bowel: Continent movement  Bladder Function  Urine Void (mL): 600 ml  Number of Times Voided: 1  Urine Color: Yellow  Genitourinary Assessment   Bladder Assessment (WDL):  WDL Except  Piedra Catheter: Present with Active Order  Piedra Reasons per MD Order: Acute  urinary retention or bladder outlet obstruction  Rivera Care: Given with Soap and Water  Urinary Elimination: Catheter (Document on LDA)  Urine Color: Yellow  Bladder Device: Indwelling Catheter    Skin  Nam Score   18  Sensory Interventions   Skin Preventative Measures: Pillows in Use for Support / Positioning  Moisture Interventions  Moisturizers/Barriers: Barrier Paste  Containment Devices: Indwelling Catheter      Pain  Pain Rating Scale  9 - Can't bear the pain, unable to do anything  Pain Location  Generalized  Pain Location Orientation  Right, Left, Upper  Pain Interventions   [place]    ADLs    Bathing      Linen Change      Personal Hygiene     Chlorhexidine Bath      Oral Care     Teeth/Dentures  Broken Teeth (Comments)  Shave     Nutrition Percentage Eaten  Lunch, Between % Consumed  Environmental Precautions  Personal Belongings, Wastebasket, Call Bell etc. in Easy Reach, Bed in Low Position  Patient Turns/Positioning  Patient Turns Self from Side to Side  Patient Turns Assistance/Tolerance     Bed Positions  Bed Controls On, Bed Locked  Head of Bed Elevated  Self regulated      Psychosocial/Neurologic Assessment  Psychosocial Assessment  Psychosocial (WDL):  WDL Except  Patient Behaviors: Fatigue  Neurologic Assessment  Neuro (WDL): Within Defined Limits  Level of Consciousness: Alert  Orientation Level: Oriented X4  Cognition: Follows commands  Speech: Clear  Pupil Assesment: Yes  R Pupil Size (mm): 2  R Pupil Shape / Description: Round  R Pupil Reaction: Brisk  L Pupil Size (mm): 2  L Pupil Shape / Description: Round  L Pupil Reaction: Brisk  Motor Function/Sensation Assessment: Sensation (in all limbs)  EENT (WDL):  WDL Except    Cardio/Pulmonary Assessment  Edema      Respiratory Breath Sounds  RUL Breath Sounds: Fine Crackles (increased aeration T/O)  RML Breath Sounds: Fine Crackles  RLL Breath Sounds: Fine Crackles  DONALD Breath Sounds: Fine Crackles  LLL Breath Sounds: Fine  Crackles  Cardiac Assessment   Cardiac (WDL):  WDL Except

## 2023-08-27 NOTE — PROGRESS NOTES
[facility] Medicine Daily Progress Note    Date of Service  8/27/2023      Chief Complaint:  CHF with vol overload    Interval History:  No significant events overnight.    Review of Systems  Review of Systems   Constitutional:  Negative for fever.   Eyes:  Negative for blurred vision.   Respiratory:  Negative for cough.    Cardiovascular:  Negative for chest pain.   Gastrointestinal:  Negative for diarrhea.   Musculoskeletal:  Negative for joint pain.   Neurological:  Negative for dizziness.   Psychiatric/Behavioral:  The patient is not nervous/anxious.         Physical Exam  Temp:  [36.7 °C (98 °F)-36.8 °C (98.3 °F)] 36.7 °C (98 °F)  Pulse:  [81-85] 85  Resp:  [17-20] 17  BP: ()/(59-66) 95/59  SpO2:  [94 %-97 %] 97 %    Physical Exam  Vitals and nursing note reviewed.   Constitutional:       Appearance: He is not diaphoretic.   HENT:      Mouth/Throat:      Pharynx: No oropharyngeal exudate or posterior oropharyngeal erythema.   Eyes:      Extraocular Movements: Extraocular movements intact.   Neck:      Vascular: No carotid bruit.   Cardiovascular:      Rate and Rhythm: Normal rate and regular rhythm.   Pulmonary:      Effort: Pulmonary effort is normal.      Breath sounds: No wheezing or rales.   Abdominal:      General: There is no distension.      Palpations: Abdomen is soft.      Tenderness: There is no abdominal tenderness.   Musculoskeletal:      Right lower leg: No edema.      Left lower leg: No edema.      Comments: Has left BKA.   Skin:     General: Skin is warm and dry.   Neurological:      Mental Status: He is alert and oriented to person, place, and time.   Psychiatric:         Mood and Affect: Mood normal.         Behavior: Behavior normal.         Fluids    Intake/Output Summary (Last 24 hours) at 8/27/2023 1105  Last data filed at 8/27/2023 0800  Gross per 24 hour   Intake 960 ml   Output 2200 ml   Net -1240 ml         Laboratory  Recent Labs     08/25/23  0621   WBC 6.7   RBC 3.42*    HEMOGLOBIN 9.5*   HEMATOCRIT 29.8*   MCV 87.1   MCH 27.8   MCHC 31.9*   RDW 49.1   PLATELETCT 194   MPV 10.1                           Imaging    Assessment/Plan  Anemia  Assessment & Plan  HB: 11.3 (8/21) --> 9.7 (8/24) --> 9.5 (8/25)  Fe: 32, sats 12%, ferritin wnl  B12: 326  F/U SFOB  Off Proph Heparin  On Fe supplements -- will d/c 2nd to ulcer disease  Note: has a hx of bleeding gastric ulcer  Cont to monitor    Acute on chronic heart failure with preserved ejection fraction (HFpEF) (Formerly [facility])  Assessment & Plan  Echo: EF 55%, RVSP 50, mod MR/AS/AR  BNP: 1220 (3/15) --> 25,906 (8/12) --> 9686 (8/16) --> 4093 (8/25)  [place] [facility] [place] Farx[place]  Off Demadex -- 2nd to lower BP (takes a lot of pain meds)  Cont to monitor    S/P BKA (below knee amputation) unilateral, left ([facility])- (present on admission)  Assessment & Plan  Ambulates with prosthesis    Vitamin D deficiency- (present on admission)  Assessment & Plan  Vit D: 21  Cont supplements    Chronic pain syndrome- (present on admission)  Assessment & Plan  Management per Physiatry    History of atrial fibrillation ( single episode 2019)- (present on admission)  Assessment & Plan  HR ok  Off Demadex 2nd to low BP  Not on any AC 2nd to hx of bleeding ulcer  Note: had cardioversion in 2019  Cont to monitor    Rheumatoid arthritis ([facility])- (present on admission)  Assessment & Plan  Chronically on Humira

## 2023-08-27 NOTE — CARE PLAN
The patient is Watcher - Medium risk of patient condition declining or worsening    Shift Goals  Clinical Goals: pain mgmt, sleep  Patient Goals: pain mgmt  Family Goals: Education    Progress made toward(s) clinical / shift goals:   Problem: Knowledge Deficit - Standard  Goal: Patient and family/care givers will demonstrate understanding of plan of care, disease process/condition, diagnostic tests and medications  Outcome: Progressing  Note: Pt education reinforced regarding plan of care with emphasis on adequate hydration, pt again shows better understanding with improved follow through, pt again reports understanding how this can effect constipation, will continue to reinforce education and continue to monitor.       Patient is not progressing towards the following goals:      Problem: Pain - Standard  Goal: Alleviation of pain or a reduction in pain to the patient’s comfort goal  Outcome: Not Progressing  Flowsheets  Taken 8/26/2023 2150  Pain Rating Scale (NPRS): 8  Taken 8/26/2023 2010  Non Verbal Scale:   Calm   Unlabored Breathing  Note: Pt states his pain is at a constant 8-9/10.  Patient able to verbalize pain level but unable to verbalize an acceptable level of pain.

## 2023-08-28 NOTE — PROGRESS NOTES
"  Physical Medicine & Rehabilitation Progress Note    Encounter Date: 8/28/2023    Chief Complaint: Pain    Interval Events (Subjective):  ISSAC COTTER 8/26  VV    Seen and examined in his room. Pain present, mildly improved. Would like better pain control. Knows a lot of pain due to RA. Follows with [facility].    Objective:  VITAL SIGNS: /66   Pulse 82   Temp 36.3 °C (97.3 °F) (Oral)   Resp 18   Ht 1.803 m (5' 11\")   Wt 86.4 kg (190 lb 7.6 oz)   SpO2 99%   BMI 26.57 kg/m²     GEN: No apparent distress  HEENT: Head normocephalic, atraumatic.  Sclera nonicteric bilaterally, no ocular discharge appreciated bilaterally.  CV: Extremities warm and well-perfused, no peripheral edema appreciated bilaterally.  PULMONARY: Breathing nonlabored on room air, no respiratory accessory muscle use.  Not requiring supplemental oxygen.  ABD: Soft, nontender.  SKIN: No appreciable skin breakdown on exposed areas of skin.  PSYCH: Mood and affect within normal limits.  NEURO: Awake alert.  Conversational.  Logical thought content.      Laboratory Values:  No results found for this or any previous visit (from the past 72 hour(s)).    Medications:  Scheduled Medications   Medication Dose Frequency    ferric gluconate (Ferrlecit) IV  250 mg BID    pregabalin  150 mg TID    fluconazole  200 mg DAILY    acetaminophen  1,000 mg TID    lidocaine  2 Patch Q24HR    vitamin D3  1,000 Units DAILY    senna-docusate  2 Tablet BID    omeprazole  20 mg DAILY    dapagliflozin propanediol  10 mg DAILY    DULoxetine  60 mg DAILY    morphine  30 mg 4X/DAY    morphine ER  15 mg BID    melatonin  3 mg QHS     PRN medications: albuterol, zolpidem, Respiratory Therapy Consult, senna-docusate **AND** polyethylene glycol/lytes **AND** magnesium hydroxide **AND** bisacodyl, mag hydrox-al hydrox-simeth, sodium chloride, naloxone    Diet:  Current Diet Order   Procedures    Diet Order Diet: Regular (For 8/23/23 breakfast, pt is requesting " victor, eggs and pancakes.  Plus with all meals, he would like 2 cokes on his trays.  Thank you.)       Medical Decision Making and Plan:      CHF exacerbation  - Originally presented with shortness of breath and pulmonary edema as well as cardiorenal syndrome  -While at [facility], patient was on IV Lasix for diuresis  - Cardiology was consulted at [facility] for patient's known aortic and mitral regurgitation, EF 55%  - Patient has been transferred on torsemide 40 mg daily, discussed new medication with [facility] hospitalist Dr. Wynne.  - Consult hospitalist  - Continue farxiga   -PT and OT for mobility and ADLs. Per guidelines, 15 hours per week between PT, OT and/or SLP.  -Follow-up with cardiology     Hypokalemia  - Transferred on potassium 10 mg daily. K + normal. Off of supplement. 8/28 BMP     Anxiety depression  - Continue Cymbalta     Anemia  - worse on 8/24/23, 9.7 from 11.3. Held heparin as history of GI bleeds. FOBT negative 8/25 x1, repeat PENDING. H/H stable in 9's. IV iron 8/28. CBC 8/29     Insomnia  - Avoiding trazodone, patient had prolonged QTc from prior EKG  - Replace standing as needed trazodone for melatonin. Poor improvement with melatonin. Will trial Ambien as has tolerated in the past.      Rheumatoid arthritis  - Is on Humira q. 2 weeks  - Last dose 8/16--> due 8/30  -Has previously been on steroids, [facility], may consider restarting if patient has an RA flare     History of left BKA  - Previously at rehab in March/April 2023  - Monitor for edema control and residual limb for proper fit with prosthesis, wife will be bringing left BKA prosthesis     Chronic pain  - Chronically followed by pain management - [facility]  - Is on home dose morphine 30 mg 4 times a day as well as MS Contin 15 mg twice daily  - Do not anticipate patient will need increased as needed pain medications  - Naloxone as needed narcotics overdose. Start  Lyrica 100 mg TID. 8/28 Increase Lyrica to 150mg TID.      Vitamin D deficiency  21 on admission.  - Start 1000 U      Bladder   - Rivera Dc'd 8/25/2023; Voiding volitionally.     Skin - Patient at risk for skin breakdown due to debility in areas including sacrum, achilles, elbows and head in addition to other sites. Nursing to assess skin daily.      GI Ppx - Patient on Prilosec for GERD prophylaxis. Patient on Senna-docusate for constipation prophylaxis.       DVT PROPHYLAXIS: None - had been on Heparin, d/c 8/24 due to ambulating > 125 feet and having h/o GI bleed and bleeding day of 8/24    HOSPITALIST FOLLOWING: Yes    CODE STATUS: FULL CODE  ____________________________________    Dr. Shaista Guerra DO, MS  [facility] - Physical Medicine & Rehabilitation   ____________________________________

## 2023-08-28 NOTE — CARE PLAN
The patient is Stable - Low risk of patient condition declining or worsening    Shift Goals  Clinical Goals: pain management  Patient Goals: pain management  Family Goals: Education    Problem: Pain - Standard  Goal: Alleviation of pain or a reduction in pain to the patient’s comfort goal  Outcome: Progressing Patient able to verbalize pain level and verbalize an acceptable level of pain.      Problem: Fall Risk - Rehab  Goal: Patient will remain free from falls  Outcome: Progressing Pt uses call light consistently and appropriately. Waits for assistance does not attempt self transfer this shift. Able to verbalize needs.

## 2023-08-28 NOTE — THERAPY
"Physical Therapy   Daily Treatment     Patient Name: Richard Hubbard II  Age:  48 y.o., Sex:  male  Medical Record #: [id number]  Today's Date: 8/28/2023     Precautions  Precautions: Fall Risk, Other (See Comments)  Comments: low BP, prothesis LLE, piedra    Subjective    Patient asleep in bed, says he wasn't notified he had 7AM therapy but is in agreement. Says he has had 3-4 falls since his BKA. \"Mostly when I'm transferring\"     Objective       08/28/23 0701   PT Charge Group   PT Gait Training (Units) 1   PT Neuromuscular Re-Education / Balance (Units) 1   PT Therapeutic Activities (Units) 2   PT Total Time Spent   PT Individual Total Time Spent (Mins) 60   Gait Functional Level of Assist    Gait Level Of Assist Contact Guard Assist   Assistive Device Front Wheel Walker   Distance (Feet) 300   # of Times Distance was Traveled 1   Deviation Antalgic;Bradykinetic  (flexed posturing)   Wheelchair Functional Level of Assist   Wheelchair Assist Supervised   Distance Wheelchair (Feet or Distance) 100   Wheelchair Description Extra time;Limited by fatigue   Transfer Functional Level of Assist   Bed, Chair, Wheelchair Transfer Minimal Assist   Bed Chair Wheelchair Transfer Description Adaptive equipment;Initial preparation for task;Increased time;Supervision for safety;Verbal cueing  (stand pivot bed > w/c, patient reaches for leg rest in stance throwing balance off slightly.)   Bed Mobility    Supine to Sit Supervised   Sit to Stand Contact Guard Assist   Neuro-Muscular Treatments   Neuro-Muscular Treatments Anterior weight shift;Compensatory Strategies;Postural Changes;Postural Facilitation;Weight Shift Right;Weight Shift Left   Comments standing w/ single UE support cross body punching w/ trunk rotation x 10, progressed to no UE support cross body punching/reaching x 10, overhead reach x 10, and reach towards floor x 10. normal ABHISHEK w/ eyes closed to failure x 3, then eyes closed lateral weight shifting x 10, eyes " closed anterior/post weight shifting x 5, w/ posterior LOB each attempt at weight shifting onto heels.   Interdisciplinary Plan of Care Collaboration   Patient Position at End of Therapy Seated;Call Light within Reach;Tray Table within Reach;Phone within Reach     Pt dons LLE prosthetic and socks (31 ply), w/ spv and min cues for for wrinkles and technique. Pt preferred to don socks prior to pin prosthetic liner. Requires extensive time to complete due to amount of socks needed for proper fit.     STS edge of mat working on equal weight bearing (pt self selects RLE back), educated on benefits, along with compensatory patterns.     Discussed fall history/risk, and interventions to address.     Assessment    Patient tolerates session well, requires lots of time to don prosthetic due to amount of socks needed for proper fit. Balance impairment present with high dependence on Ues.    Strengths: Able to follow instructions, Independent prior level of function, Motivated for self care and independence, Pleasant and cooperative, Supportive family, Willingly participates in therapeutic activities  Barriers: Decreased endurance, Fatigue, Generalized weakness, Hypotension, Impaired balance, Impaired activity tolerance, Limited mobility (ill fitting prosthesis, poly-joint RA)    Plan    Wc endurance, sit<>stand and progressive gait training with FWW and L prosthesis, supine/ sidelying and prone mat program.       Physical Therapy Problems (Active)       Problem: Balance       Dates: Start:  08/21/23         Goal: STG-Within one week, patient will maintain static standing 2 minutes x 3 with  UE support at // bars for wt shifting/ prosthesis education and limb desensitization       Dates: Start:  08/21/23               Problem: Mobility Transfers       Dates: Start:  08/21/23         Goal: STG-Within one week, patient will perform bed mobility modified independent       Dates: Start:  08/21/23               Problem: PT-Long Term  Goals       Dates: Start:  08/21/23         Goal: LTG-By discharge, patient will tolerate standing 5 minutes x 2 for wt shifting/ balance training and prosthesis training/ tolerance       Dates: Start:  08/21/23            Goal: LTG-By discharge, patient will propel wheelchair modified independent 50 ft x 2       Dates: Start:  08/21/23            Goal: LTG-By discharge, patient will ambulate SBA with FWW/ L prosthesis x 50 ft       Dates: Start:  08/21/23            Goal: LTG-By discharge, patient will transfer one surface to another modified independent with or without prosthesis       Dates: Start:  08/21/23            Goal: LTG-By discharge, patient will transfer in/out of a car modified independent with or without prosthesis       Dates: Start:  08/21/23

## 2023-08-28 NOTE — PROGRESS NOTES
NURSING DAILY NOTE    Name: Richard Hubbard II   Date of Admission: 8/20/2023   Admitting Diagnosis: Acute diastolic heart failure (HCC)  Attending Physician: Keaton Pisano M.d.  Allergies: Nsaids, Diphenhydramine, Mirtazapine, Penicillins, and Promethazine    Safety  Patient Assist  mod assist  Patient Precautions  Fall Risk, Other (See Comments)  Precaution Comments  low BP, prothesis LLE, piedra  Bed Transfer Status  Minimal Assist (squat-pivot tx from bed to wc to pt's L with Min A)  Toilet Transfer Status   Contact Guard Assist  Assistive Devices  Wheelchair  Oxygen  None - Room Air  Diet/Therapeutic Dining  Current Diet Order   Procedures    Diet Order Diet: Regular (For 8/23/23 breakfast, pt is requesting victor, eggs and pancakes.  Plus with all meals, he would like 2 cokes on his trays.  Thank you.)     Pill Administration  whole  Agitated Behavioral Scale     ABS Level of Severity       Fall Risk  Has the patient had a fall this admission?   No  Osiris Healy Fall Risk Scoring  19, HIGH RISK  Fall Risk Safety Measures  poor balance and low vision/ hearing    Vitals  Temperature: 36.7 °C (98.1 °F)  Temp src: Oral  Pulse: 78  Respiration: 18  Blood Pressure: 93/52  Blood Pressure MAP (Calculated): 66 MM HG  BP Location: Left, Upper Arm  Patient BP Position: Sitting     Oxygen  Pulse Oximetry: 97 %  O2 (LPM): 1.5  FiO2%: 21 %  O2 Delivery Device: None - Room Air    Bowel and Bladder  Last Bowel Movement  08/26/23  Stool Type  Type 3: Like a sausage, but with cracks on its surface, Type 4: Like a sausage or snake, smooth and soft (between 3 and 4)  Bowel Device  Bathroom  Continent  Bladder: Continent void (Piedra catheter)   Bowel: Continent movement  Bladder Function  Urine Void (mL): 300 ml (urinals)  Number of Times Voided: 1  Urine Color: Yellow  Genitourinary Assessment   Bladder Assessment (WDL):  Within Defined Limits  Piedra  Catheter: Present with Active Order  Rivera Reasons per MD Order: Acute urinary retention or bladder outlet obstruction  Rivera Care: Given with Soap and Water  Urinary Elimination: Catheter (Document on LDA)  Urine Color: Yellow  Bladder Device: Indwelling Catheter    Skin  Nam Score   18  Sensory Interventions   Skin Preventative Measures: Pillows in Use for Support / Positioning  Moisture Interventions  Moisturizers/Barriers: Moisturizer   Containment Devices: Indwelling Catheter      Pain  Pain Rating Scale  7 - Focus of attention, prevents doing daily activities  Pain Location  Ankle, Knee, Wrist  Pain Location Orientation  Right, Left, Upper  Pain Interventions   Medication (see MAR)    ADLs    Bathing      Linen Change   Partial  Personal Hygiene  Change Charlotte Pads  Chlorhexidine Bath      Oral Care  Brushed Teeth  Teeth/Dentures  Broken Teeth (Comments)  Shave  Self  Nutrition Percentage Eaten  Lunch, Between % Consumed  Environmental Precautions  Bed in Low Position, Treaded Slipper Socks on Patient  Patient Turns/Positioning  Patient Turns Self from Side to Side  Patient Turns Assistance/Tolerance     Bed Positions  Bed Controls On  Head of Bed Elevated  Self regulated      Psychosocial/Neurologic Assessment  Psychosocial Assessment  Psychosocial (WDL):  Within Defined Limits  Patient Behaviors: Irritable  Neurologic Assessment  Neuro (WDL): Within Defined Limits  Level of Consciousness: Alert  Orientation Level: Oriented X4  Cognition: Follows commands  Speech: Clear  Pupil Assesment: Yes  R Pupil Size (mm): 2  R Pupil Shape / Description: Round  R Pupil Reaction: Brisk  L Pupil Size (mm): 2  L Pupil Shape / Description: Round  L Pupil Reaction: Brisk  Motor Function/Sensation Assessment: Sensation (sensation in all 4 limbs)  EENT (WDL):  WDL Except    Cardio/Pulmonary Assessment  Edema      Respiratory Breath Sounds  RUL Breath Sounds: Fine Crackles  RML Breath Sounds: Fine Crackles  RLL Breath  Sounds: Fine Crackles  DONALD Breath Sounds: Fine Crackles  LLL Breath Sounds: Fine Crackles  Cardiac Assessment   Cardiac (WDL):  WDL Except (CHF causing debility)

## 2023-08-28 NOTE — CARE PLAN
The patient is Stable - Low risk of patient condition declining or worsening    Shift Goals  Clinical Goals: Pain control, safety  Patient Goals: Safety  Family Goals: Education    Progress made toward(s) clinical / shift goals:      Problem: Fall Risk - Rehab  Goal: Patient will remain free from falls  Outcome: Progressing  Note: Pt remains free from falls   Call light is within reach, pt understands to have a staff member present when transferring (bed to WC), going to bathroom, or putting on prosthesis.  Pt is A&O x 4 bed & WC alarms in place  Reinforce use of call light when needs to get out of bed/WC.

## 2023-08-28 NOTE — THERAPY
Occupational Therapy  Daily Treatment     Patient Name: Richard Hubbard II  Age:  48 y.o., Sex:  male  Medical Record #: [id number]  Today's Date: 8/28/2023     Precautions  Precautions: (P) Fall Risk, Other (See Comments)  Comments: (P) low BP, prothesis LLE         Subjective    Pt up in w/c at approach, requesting to shower.      Objective       08/28/23 0931   OT Charge Group   Charges Yes   OT Self Care / ADL (Units) 3   OT Therapy Activity (Units) 1   OT Total Time Spent   OT Individual Total Time Spent (Mins) 60   Precautions   Precautions Fall Risk;Other (See Comments)   Comments low BP, prothesis LLE   Cognition    Cognition / Consciousness WDL   Orientation Level Oriented x 4   Level of Consciousness Alert   Sleep/Wake Cycle   Sleep & Rest Awake   Functional Level of Assist   Grooming Modified Independent   Grooming Description Increased time;Seated in wheelchair at sink  (brushing teeth, mouth wash, brushing hair, shaving)   Bathing Standby Assist   Bathing Description Grab bar;Hand held shower;Tub bench;Increased time;Initial preparation for task;Set-up of equipment;Supervision for safety  (UB/LB bathing)   Upper Body Dressing Modified Independent   Upper Body Dressing Description Increased time  (including retrieval)   Lower Body Dressing Standby Assist   Lower Body Dressing Description Grab bar;Increased time;Supervision for safety  (including retreival)   Tub / Shower Transfers Standby Assist   Tub Shower Transfer Description Grab bar;Shower bench;Supervision for safety;Verbal cueing   Balance   Sitting Balance (Static) Fair +   Sitting Balance (Dynamic) Fair   Standing Balance (Static) Fair -   Standing Balance (Dynamic) Poor +   Interdisciplinary Plan of Care Collaboration   IDT Collaboration with  Physical Therapist   Patient Position at End of Therapy Seated;Chair Alarm On;Other (Comments)  (in main gym, ready for PT)   Strengths & Barriers   Strengths Able to follow instructions;Alert and  oriented;Effective communication skills;Independent prior level of function;Pleasant and cooperative;Supportive family;Willingly participates in therapeutic activities   Barriers Decreased endurance;Fatigue;Generalized weakness;Impaired activity tolerance;Impaired balance     Theract:  -started own laundry in washer including putting clothes in, adding detergent, and starting machine from seated in w/c.    Assessment    Pt with good tolerance to OT treatment on this date focused on ADLs and theract. Pt completed most tasks with supervision with intermittent SBA/CGA. He demonstrates good insight into deficits and able to ask for assistance where needed.     Strengths: (P) Able to follow instructions, Alert and oriented, Effective communication skills, Independent prior level of function, Pleasant and cooperative, Supportive family, Willingly participates in therapeutic activities  Barriers: (P) Decreased endurance, Fatigue, Generalized weakness, Impaired activity tolerance, Impaired balance    Plan    ADL's, IADL's at w/c level, standing bal/tolerance, strengthening/endurance, functional mobility    Occupational Therapy Goals (Active)       Problem: Functional Transfers       Dates: Start:  08/24/23         Goal: STG-Within one week, patient will transfer to tub/shower with supervision using w/c and tub txfr bench.        Dates: Start:  08/24/23               Problem: IADL's       Dates: Start:  08/24/23         Goal: STG-Within one week, patient will access kitchen area at supv level using AE as needed.        Dates: Start:  08/24/23               Problem: OT Long Term Goals       Dates: Start:  08/21/23         Goal: LTG-By discharge, patient will complete basic self care tasks with mod I-SPV       Dates: Start:  08/21/23            Goal: LTG-By discharge, patient will perform bathroom transfers with mod I-SPV       Dates: Start:  08/21/23            Goal: LTG-By discharge, patient will complete basic home  management with mod I-SPV from w/c level        Dates: Start:  08/21/23

## 2023-08-28 NOTE — PROGRESS NOTES
Received shift report from day RN Rolando and assumed care of patient.  Patient awake, calm and stable, currently positioned in Family Lounge in his wheelchair. Pt stated he wants his pain medications with his NOC med pass.  Educated pt to let me know if  pain or discomfort worsens before this time.  Will continue to monitor.

## 2023-08-28 NOTE — THERAPY
"Occupational Therapy  Daily Treatment     Patient Name: Richard Hubbard II  Age:  48 y.o., Sex:  male  Medical Record #: [id number]  Today's Date: 8/28/2023     Precautions  Precautions: (P) Fall Risk, Other (See Comments)  Comments: (P) low BP, prothesis LLE         Subjective    \" Can we do some leg presses?\"     Objective       08/28/23 1331   OT Charge Group   OT Therapeutic Exercise (Units) 2   OT Total Time Spent   OT Individual Total Time Spent (Mins) 30   Precautions   Precautions Fall Risk;Other (See Comments)   Comments low BP, prothesis LLE   Sitting Upper Body Exercises   Other Exercise Biceps/ triceps strengthening   rikhaw   x 20 reps x 3 with 30 lbs and  x 20 reps x 1 with 25lbs   facing      20 rep x 2 backed in with 30lbs   Interdisciplinary Plan of Care Collaboration   Patient Position at End of Therapy Seated;Call Light within Reach;Tray Table within Reach        Right leg press on shuttle   x 20 reps  x 2   with  3 bands  and  x 20 reps  x 4 bands      Squat pivot transfer    w/c  <->  shuttle   supervision   Assessment     Motivated to improve  overall strength/endurance and return home    Completed ther ex  with no  complaints     Strengths: Able to follow instructions, Alert and oriented, Effective communication skills, Independent prior level of function, Pleasant and cooperative, Supportive family, Willingly participates in therapeutic activities  Barriers: Decreased endurance, Fatigue, Generalized weakness, Impaired activity tolerance, Impaired balance    Plan    ADL's, IADL's at w/c level, standing bal/tolerance, strengthening/endurance, functional mobility    Occupational Therapy Goals (Active)       Problem: Functional Transfers       Dates: Start:  08/24/23         Goal: STG-Within one week, patient will transfer to tub/shower with supervision using w/c and tub txfr bench.        Dates: Start:  08/24/23               Problem: IADL's       Dates: Start:  08/24/23         Goal: " STG-Within one week, [facility] as needed.        Dates: Start:  08/24/23               Problem: OT Long Term Goals       Dates: Start:  08/21/23         Goal: LTG-By discharge, patient will complete basic self care tasks with mod I-SPV       Dates: Start:  08/21/23            Goal: LTG-By discharge, patient will perform bathroom transfers with mod I-SPV       Dates: Start:  08/21/23            Goal: LTG-By discharge, patient will complete basic home management with mod I-SPV from w/c level        Dates: Start:  08/21/23

## 2023-08-28 NOTE — THERAPY
Recreational Therapy  Daily Treatment     Patient Name: Richard Hubbard II  AGE:  48 y.o., SEX:  male  Medical Record #: [id number]  Today's Date: 8/28/2023       Subjective    Patient agreeable to recreation therapy session.      Objective       08/28/23 1301   Procedural Tracking   Procedural Tracking Community Re-Integration;Community Skills Development;Leisure Skills Awareness;Leisure Skills Development;Social Skills Training;Cognitive Skills Training;Gross Motor Functional Leisure Skills;Fine Motor Functional Leisure Skills;Group Treatment   Treatment Time   Total Time Spent (mins) 30   Total Time Missed 0   Functional Ability Status - Cognitive   Attention Span Remains on Task   Comprehension Follows One Step Commands;Follows Two Step Commands   Judgment Able to Make Independent Decisions   Functional Ability Status - Emotional    Affect Appropriate   Mood Appropriate   Behavior Cooperative;Appropriate   Leisure Competence Measure   Leisure Awareness Modified Independent   Leisure Attitude Modified Independent   Leisure Skills Modified Independent   Cultural / Social Behaviors Modified Independent   Interpersonal Skills Modified Independent   Community Integration Skills Modified Independent   Social Contact Modified Independent   Community Participation Modified Independent   Skilled Intervention    Skilled Intervention Social Skills   Interdisciplinary Plan of Care Collaboration   Patient Position at End of Therapy Seated   Strengths & Barriers   Strengths Able to follow instructions;Alert and oriented;Effective communication skills;Willingly participates in therapeutic activities;Pleasant and cooperative;Motivated for self care and independence;Making steady progress towards goals   Barriers Decreased endurance;Generalized weakness;Fatigue;Impaired activity tolerance;Impaired balance;Limited mobility;Pain         Assessment    Patient known to CTRS from previous stay at [facility]. Patient and CTRS discussed  what the past few months have looked like, upcoming medical procedures, and there frequent hospital visits he has experienced. Patient would like to participate in rec while here. Discussed goals as well as limited time. Patient receptive and agreeable.     Strengths: (P) Able to follow instructions, Alert and oriented, Effective communication skills, Willingly participates in therapeutic activities, Pleasant and cooperative, Motivated for self care and independence, Making steady progress towards goals  Barriers: (P) Decreased endurance, Generalized weakness, Fatigue, Impaired activity tolerance, Impaired balance, Limited mobility, Pain    Plan    Patient will benefit from continued recreation therapy sessions.     Passport items to be completed:  Verbalize two positive leisure activities, discuss returning to work, hobbies, community groups or volunteer activities, explore community resources

## 2023-08-28 NOTE — PROGRESS NOTES
Patient care assumed. Report received from [facility] BARBARA Yates. Patient is alert and calm, resting in bed. Call light and bedside table within reach. Will continue to monitor.

## 2023-08-28 NOTE — CARE PLAN
The patient is Stable - Low risk of patient condition declining or worsening    Shift Goals  Clinical Goals: Pain control, safety  Patient Goals: Safety  Family Goals: Education    Progress made toward(s) clinical / shift goals:  Pt's VSS, occult blood 2/2 collected, diflucan for groin yeast infection given, pain controlled with scheduled medication, H&H trending down no s/sx, able to participate in therapy.

## 2023-08-28 NOTE — PROGRESS NOTES
[facility] Medicine Daily Progress Note    Date of Service  8/28/2023      Chief Complaint:  CHF with vol overload    Interval History:  Discussed about giving him IV Fe instead of orals since he has a hx of bleeding ulcer.    Review of Systems  Review of Systems   Constitutional:  Negative for chills and fever.   Respiratory:  Negative for shortness of breath.    Cardiovascular:  Negative for chest pain.   Gastrointestinal:  Negative for abdominal pain, diarrhea, nausea and vomiting.   Psychiatric/Behavioral:  The patient is not nervous/anxious.         Physical Exam  Temp:  [36.4 °C (97.5 °F)-36.7 °C (98.1 °F)] 36.4 °C (97.5 °F)  Pulse:  [78-80] 80  Resp:  [17-18] 18  BP: (92-98)/(52-60) 98/60  SpO2:  [93 %-98 %] 98 %    Physical Exam  Vitals and nursing note reviewed.   Constitutional:       Appearance: Normal appearance.   HENT:      Head: Atraumatic.   Eyes:      Conjunctiva/sclera: Conjunctivae normal.      Pupils: Pupils are equal, round, and reactive to light.   Cardiovascular:      Rate and Rhythm: Normal rate and regular rhythm.      Heart sounds: No murmur heard.  Pulmonary:      Effort: Pulmonary effort is normal.      Breath sounds: No stridor. No wheezing or rales.   Abdominal:      General: There is no distension.      Palpations: Abdomen is soft.      Tenderness: There is no abdominal tenderness.   Musculoskeletal:      Cervical back: Normal range of motion and neck supple.      Right lower leg: No edema.      Left lower leg: No edema.      Comments: Has left BKA.   Skin:     General: Skin is warm and dry.      Findings: No rash.   Neurological:      Mental Status: He is alert and oriented to person, place, and time.   Psychiatric:         Mood and Affect: Mood normal.         Behavior: Behavior normal.         Fluids    Intake/Output Summary (Last 24 hours) at 8/28/2023 1053  Last data filed at 8/28/2023 0900  Gross per 24 hour   Intake 1260 ml   Output 2000 ml   Net -740 ml         Laboratory                             Imaging    Assessment/Plan  Anemia  Assessment & Plan  HB: 11.3 (8/21) --> 9.7 (8/24) --> 9.5 (8/25)  Fe: 32, sats 12%, ferritin wnl  B12: 326  SFOB (-) x 1  Off Proph Heparin (hx of bleeding gastric ulcer)  Off Fe supplements -- stopped 2nd to ulcer disease  Will give IV Fe   Cont to monitor    Acute on chronic heart failure with preserved ejection fraction (HFpEF) ([facility])  Assessment & Plan  Echo: EF 55%, RVSP 50, mod MR/AS/AR  BNP: 1220 (3/15) --> 25,906 (8/12) --> 9686 (8/16) --> 4093 (8/25)  S/P IV Lasix at [facility]  Cont Farxiga  Off Demadex -- 2nd to lower BP (takes a lot of pain meds)  Cont to monitor    S/P BKA (below knee amputation) unilateral, left (HCC)- (present on admission)  Assessment & Plan  Ambulates with prosthesis    Vitamin D deficiency- (present on admission)  Assessment & Plan  Vit D: 21  Cont supplements    Chronic pain syndrome- (present on admission)  Assessment & Plan  Management per Physiatry    History of atrial fibrillation ( single episode 2019)- (present on admission)  Assessment & Plan  HR ok  Off Demadex 2nd to low BP  Not on any AC 2nd to hx of bleeding ulcer  Note: had cardioversion in 2019  Cont to monitor    Rheumatoid arthritis (HCC)- (present on admission)  Assessment & Plan  Chronically on Humira

## 2023-08-28 NOTE — THERAPY
Physical Therapy   Daily Treatment     Patient Name: Richard Hubbard II  Age:  48 y.o., Sex:  male  Medical Record #: [id number]  Today's Date: 8/28/2023     Precautions  Precautions: Fall Risk, Other (See Comments)  Comments: low BP, prothesis LLE, piedra    Subjective    Patient agreeable to session. Has no complaints.       Objective       08/28/23 1031   PT Charge Group   PT Therapeutic Exercise (Units) 2   PT Total Time Spent   PT Individual Total Time Spent (Mins) 30   Transfer Functional Level of Assist   Bed, Chair, Wheelchair Transfer Minimal Assist   Bed Chair Wheelchair Transfer Description Adaptive equipment;Increased time;Set-up of equipment;Supervision for safety;Verbal cueing   Supine Lower Body Exercise   Bridges One Legged;Two Legged;1 set of 15  (1 set single leg, 1 set BLEs on bolster w/ 4# weight)   Hip Abduction Side Lying;2 sets of 15;Left  (straight leg, TCs for form)   Hip Adduction  Bilateral;1 set of 15  (iso ball squeeze)   Straight Leg Raises Front;2 sets of 15;Left;Weight (See Comments for lbs)  (0#/4#)   Other Exercises sidelying hip ext w/ abd iso 2 x 10 LLE. TCs for form   Sitting Lower Body Exercises   Hip Abduction 1 set of 15;Bilateral;Heavy Resistance Theraband   Long Arc Quad 1 set of 15;Right;Weight (See Comments for lbs)  (4#)   Bed Mobility    Supine to Sit Supervised   Sit to Supine Supervised   Sit to Stand Contact Guard Assist   Rolling Supervised   Interdisciplinary Plan of Care Collaboration   IDT Collaboration with  Occupational Therapist   Patient Position at End of Therapy Seated;Chair Alarm On  (self propel w/c back to room)   Collaboration Comments received from OT         Assessment    Patient tolerates well, evident BLE weakness/atrophy present, compensates via hip flexors often. Would benefit from posterior chain strengthening.     Strengths: Able to follow instructions, Independent prior level of function, Motivated for self care and independence, Pleasant and  cooperative, Supportive family, Willingly participates in therapeutic activities  Barriers: Decreased endurance, Fatigue, Generalized weakness, Hypotension, Impaired balance, Impaired activity tolerance, Limited mobility (ill fitting prosthesis, poly-joint RA)    Plan    Wc endurance, sit<>stand and progressive    supine/ sidelying and prone mat program.  Posterior chain strength  Standing balance unsupported  Prosthetic gait training.        Physical Therapy Problems (Active)       Problem: Balance       Dates: Start:  08/21/23         Goal: STG-Within one week, patient will maintain static standing 2 minutes x 3 with  UE support at // bars for wt shifting/ prosthesis education and limb desensitization       Dates: Start:  08/21/23               Problem: Mobility Transfers       Dates: Start:  08/21/23         Goal: STG-Within one week, patient will perform bed mobility modified independent       Dates: Start:  08/21/23               Problem: PT-Long Term Goals       Dates: Start:  08/21/23         Goal: LTG-By discharge, patient will tolerate standing 5 minutes x 2 for wt shifting/ balance training and prosthesis training/ tolerance       Dates: Start:  08/21/23            Goal: LTG-By discharge, patient will propel wheelchair modified independent 50 ft x 2       Dates: Start:  08/21/23            Goal: LTG-By discharge, patient will ambulate SBA with FWW/ L prosthesis x 50 ft       Dates: Start:  08/21/23            Goal: LTG-By discharge, patient will transfer one surface to another modified independent with or without prosthesis       Dates: Start:  08/21/23            Goal: LTG-By discharge, patient will transfer in/out of a car modified independent with or without prosthesis       Dates: Start:  08/21/23

## 2023-08-29 PROBLEM — E87.79 CARDIAC VOLUME OVERLOAD: Status: ACTIVE | Noted: 2023-01-01

## 2023-08-29 NOTE — PROGRESS NOTES
NURSING DAILY NOTE    Name: Richard Hubbard II   Date of Admission: 8/20/2023   Admitting Diagnosis: Acute diastolic heart failure (HCC)  Attending Physician: Shaista Guerra D.o.  Allergies: Nsaids, Diphenhydramine, Mirtazapine, Penicillins, and Promethazine    Safety  Patient Assist  Mod assist  Patient Precautions  Fall Risk, Other (See Comments)  Precaution Comments  low BP, prothesis LLE  Bed Transfer Status  Minimal Assist  Toilet Transfer Status   Contact Guard Assist  Assistive Devices  Wheelchair  Oxygen  None - Room Air  Diet/Therapeutic Dining  Current Diet Order   Procedures    Diet Order Diet: Regular (For 8/23/23 breakfast, pt is requesting victor, eggs and pancakes.  Plus with all meals, he would like 2 cokes on his trays.  Thank you.)     Pill Administration  whole  Agitated Behavioral Scale     ABS Level of Severity       Fall Risk  Has the patient had a fall this admission?   No  Osiris Healy Fall Risk Scoring  16, HIGH RISK  Fall Risk Safety Measures  chair alarm    Vitals  Temperature: 36.3 °C (97.3 °F)  Temp src: Oral  Pulse: 82  Respiration: 18  Blood Pressure: 109/66  Blood Pressure MAP (Calculated): 80 MM HG  BP Location: Left, Upper Arm  Patient BP Position: Sitting     Oxygen  Pulse Oximetry: 99 %  O2 (LPM): 0  FiO2%: 21 %  O2 Delivery Device: None - Room Air    Bowel and Bladder  Last Bowel Movement  08/26/23  Stool Type  Type 3: Like a sausage, but with cracks on its surface, Type 4: Like a sausage or snake, smooth and soft (between 3 and 4)  Bowel Device  Bathroom  Continent  Bladder: Continent void (Rivera catheter)   Bowel: Continent movement  Bladder Function  Urine Void (mL): 300 ml  Number of Times Voided: 1  Urine Color: Yellow  Genitourinary Assessment   Bladder Assessment (WDL):  Within Defined Limits  Rivera Catheter: Present with Active Order  Rivera Reasons per MD Order: Acute urinary retention or bladder outlet  obstruction  Rivera Care: Given with Soap and Water  Urinary Elimination: Catheter (Document on LDA)  Urine Color: Yellow  Bladder Device: Indwelling Catheter    Skin  Nma Score   18  Sensory Interventions   Skin Preventative Measures: Pillows in Use for Support / Positioning  Moisture Interventions  Moisturizers/Barriers: Barrier Wipes  Containment Devices: Indwelling Catheter      Pain  Pain Rating Scale  7 - Focus of attention, prevents doing daily activities  Pain Location  Generalized  Pain Location Orientation  Right, Left, Upper  Pain Interventions   Medication (see MAR) (scheduled)    ADLs    Bathing      Linen Change   Partial  Personal Hygiene  Change [place] Pads  Chlorhexidine Bath      Oral Care  Brushed Teeth  Teeth/Dentures  Broken Teeth (Comments)  Shave  Self  Nutrition Percentage Eaten  Between 50-75% Consumed  Environmental Precautions  Bed in Low Position, Treaded Slipper Socks on Patient  Patient Turns/Positioning  Patient Turns Self from Side to Side  Patient Turns Assistance/Tolerance     Bed Positions  Bed Controls On, Bed Locked  Head of Bed Elevated  Self regulated      Psychosocial/Neurologic Assessment  Psychosocial Assessment  Psychosocial (WDL):  Within Defined Limits  Patient Behaviors: Irritable  Neurologic Assessment  Neuro (WDL): Within Defined Limits  Level of Consciousness: Alert  Orientation Level: Oriented X4  Cognition: Follows commands  Speech: Clear  Pupil Assesment: Yes  R Pupil Size (mm): 2  R Pupil Shape / Description: Round  R Pupil Reaction: Brisk  L Pupil Size (mm): 2  L Pupil Shape / Description: Round  L Pupil Reaction: Brisk  Motor Function/Sensation Assessment: Sensation  RUE Sensation: Full sensation  LUE Sensation: Full sensation  RLE Sensation: Full sensation  LLE Sensation: Full sensation  EENT (WDL):  WDL Except    Cardio/Pulmonary Assessment  Edema      Respiratory Breath Sounds  RUL Breath Sounds: Clear  RML Breath Sounds: Clear  RLL Breath Sounds: Clear  DONALD  Breath Sounds: Clear  LLL Breath Sounds: Clear  Cardiac Assessment   Cardiac (WDL):  WDL Except

## 2023-08-29 NOTE — PROGRESS NOTES
[facility] Medicine Daily Progress Note        Chief Complaint:  Volume overload    Interval History:  Pt c/o right sided chest pain, worse w/ deep inspiration.  Labs reviewed, will get CXR.    Review of Systems  Review of Systems   Constitutional:  Negative for chills and fever.   HENT: Negative.     Eyes: Negative.    Respiratory:  Negative for cough and shortness of breath.    Cardiovascular:  Positive for chest pain. Negative for palpitations.   Gastrointestinal:  Negative for abdominal pain, nausea and vomiting.   Genitourinary: Negative.    Musculoskeletal: Negative.    Skin:  Negative for itching and rash.   Endo/Heme/Allergies:  Negative for polydipsia. Does not bruise/bleed easily.        Physical Exam  Temp:  [36.3 °C (97.4 °F)-36.9 °C (98.4 °F)] 36.4 °C (97.5 °F)  Pulse:  [70-87] 70  Resp:  [18-20] 20  BP: ()/(54-64) 104/64  SpO2:  [91 %-99 %] 92 %    Physical Exam  Vitals reviewed.   Constitutional:       General: He is not in acute distress.     Appearance: Normal appearance. He is not ill-appearing.   HENT:      Head: Normocephalic and atraumatic.      Right Ear: External ear normal.      Left Ear: External ear normal.      Nose: Nose normal.      Mouth/Throat:      Pharynx: Oropharynx is clear.   Eyes:      General:         Right eye: No discharge.         Left eye: No discharge.      Extraocular Movements: Extraocular movements intact.      Conjunctiva/sclera: Conjunctivae normal.   Cardiovascular:      Rate and Rhythm: Normal rate and regular rhythm.   Pulmonary:      Effort: No respiratory distress.      Breath sounds: No wheezing.      Comments: Faint crackles  Abdominal:      General: Bowel sounds are normal. There is no distension.      Palpations: Abdomen is soft.      Tenderness: There is no abdominal tenderness.   Musculoskeletal:      Cervical back: Normal range of motion and neck supple.      Right lower leg: No edema.      Comments: L BKA   Skin:     General: Skin is warm and dry.    Neurological:      Mental Status: He is alert and oriented to person, place, and time.         Fluids    Intake/Output Summary (Last 24 hours) at 8/29/2023 1551  Last data filed at 8/29/2023 1340  Gross per 24 hour   Intake 1680 ml   Output 3400 ml   Net -1720 ml       Laboratory  Recent Labs     08/29/23  0610   WBC 6.3   RBC 3.40*   HEMOGLOBIN 9.4*   HEMATOCRIT 30.4*   MCV 89.4   MCH 27.6   MCHC 30.9*   RDW 51.7*   PLATELETCT 205   MPV 9.9     Recent Labs     08/29/23  0610   SODIUM 136   POTASSIUM 4.4   CHLORIDE 105   CO2 23   GLUCOSE 79   BUN 16   CREATININE 0.87   CALCIUM 8.3*                 Assessment/Plan  Cardiac volume overload  Assessment & Plan  Echo EF 55%, RVSP 50 mmHg, mod MR/AS/AR  S/P IV Lasix then changed to PO Torsemide prior to Rehab admission  Torsemide discontinued last week for hypotension  Also on Farxiga  CXR now shows worsening pulm edema  Will resume diuretics, trial low dose Lasix    Anemia  Assessment & Plan  Fe 32, getting IV Fe per Dr. Nicole SUTTON negative x 2 thus far  Follow H/H    History of gastric bypass  Assessment & Plan  Monitor electrolytes    S/P BKA (below knee amputation) unilateral, left (HCC)- (present on admission)  Assessment & Plan  Ambulates with prosthesis    Vitamin D deficiency- (present on admission)  Assessment & Plan  Vit D level 21  On supplementation    Chronic pain syndrome- (present on admission)  Assessment & Plan  On Morphine  Management per Physiatry    History of atrial fibrillation ( single episode 2019)- (present on admission)  Assessment & Plan  S/P cardioversion in 2019  On no anti-plts or AC due to h/o GIB    Rheumatoid arthritis (HCC)- (present on admission)  Assessment & Plan  Presently on Prednisone  Also chronically on Humira  Rheumatology F/U    History of bleeding ulcers- (present on admission)  Assessment & Plan  Will increase Omeprazole given concurrent use of Prednisone       Full Code    Reviewed w/ Dr. Guerra

## 2023-08-29 NOTE — PROGRESS NOTES
NURSING DAILY NOTE    Name: Richard Hubbard II   Date of Admission: 8/20/2023   Admitting Diagnosis: Acute diastolic heart failure (HCC)  Attending Physician: Shaista Guerra D.o.  Allergies: Nsaids, Diphenhydramine, Mirtazapine, Penicillins, and Promethazine    Safety  Patient Assist  min assist  Patient Precautions  Fall Risk, Other (See Comments)  Precaution Comments  low BP, prothesis LLE  Bed Transfer Status  Minimal Assist  Toilet Transfer Status   Contact Guard Assist  Assistive Devices  Rails, Wheelchair  Oxygen  None - Room Air  Diet/Therapeutic Dining  Current Diet Order   Procedures    Diet Order Diet: Regular (For 8/23/23 breakfast, pt is requesting victor, eggs and pancakes.  Plus with all meals, he would like 2 cokes on his trays.  Thank you.)     Pill Administration  whole  Agitated Behavioral Scale     ABS Level of Severity       Fall Risk  Has the patient had a fall this admission?   No  Osiris Healy Fall Risk Scoring  16, HIGH RISK  Fall Risk Safety Measures  bed alarm, chair alarm, and poor balance    Vitals  Temperature: 36.9 °C (98.4 °F)  Temp src: Oral  Pulse: 87  Respiration: 18  Blood Pressure: 109/63  Blood Pressure MAP (Calculated): 78 MM HG  BP Location: Left, Upper Arm  Patient BP Position: Sitting     Oxygen  Pulse Oximetry: 99 %  O2 (LPM): 0  FiO2%: 21 %  O2 Delivery Device: None - Room Air    Bowel and Bladder  Last Bowel Movement  08/26/23  Stool Type  Type 3: Like a sausage, but with cracks on its surface, Type 4: Like a sausage or snake, smooth and soft (between 3 and 4)  Bowel Device  Bathroom  Continent  Bladder: Continent void (Rivera catheter)   Bowel: Continent movement  Bladder Function  Urine Void (mL): 300 ml (urinals)  Number of Times Voided: 1  Urine Color: Yellow  Genitourinary Assessment   Bladder Assessment (WDL):  Within Defined Limits  Rivera Catheter: Present with Active Order  Rivera Reasons per MD Order:  Acute urinary retention or bladder outlet obstruction  Rivera Care: Given with Soap and Water  Urinary Elimination: Catheter (Document on LDA)  Urine Color: Yellow  Bladder Device: Indwelling Catheter    Skin  Nam Score   18  Sensory Interventions   Skin Preventative Measures: Pillows in Use for Support / Positioning  Moisture Interventions  Moisturizers/Barriers: Barrier Wipes  Containment Devices: Indwelling Catheter      Pain  Pain Rating Scale  0 - No Pain  Pain Location  Generalized  Pain Location Orientation  Right, Left  Pain Interventions   [place]    ADLs    Bathing   Patient Refused Bathing (claims he got shower this Da y shift)  Linen Change   Partial  Personal Hygiene  Change [place] Pads  Chlorhexidine Bath      Oral Care  Brushed Teeth  Teeth/Dentures  Broken Teeth (Comments)  Shave  Self  Nutrition Percentage Eaten  Between 50-75% Consumed  Environmental Precautions  Bed in Low Position  Patient Turns/Positioning  Patient Turns Self from Side to Side  Patient Turns Assistance/Tolerance     Bed Positions  Bed Controls On  Head of Bed Elevated  Self regulated      Psychosocial/Neurologic Assessment  Psychosocial Assessment  Psychosocial (WDL):  Within Defined Limits  Patient Behaviors: Irritable  Neurologic Assessment  Neuro (WDL): Within Defined Limits  Level of Consciousness: Alert  Orientation Level: Oriented X4  Cognition: Follows commands  Speech: Clear  Pupil Assesment: Yes  R Pupil Size (mm): 2  R Pupil Shape / Description: Round  R Pupil Reaction: Brisk  L Pupil Size (mm): 2  L Pupil Shape / Description: Round  L Pupil Reaction: Brisk  Motor Function/Sensation Assessment: Sensation  RUE Sensation: Full sensation  LUE Sensation: Full sensation  RLE Sensation: Full sensation  LLE Sensation: Full sensation  EENT (WDL):  WDL Except    Cardio/Pulmonary Assessment  Edema      Respiratory Breath Sounds  RUL Breath Sounds: Clear  RML Breath Sounds: Clear  RLL Breath Sounds: Clear  DONALD Breath Sounds:  Clear  LLL Breath Sounds: Clear  Cardiac Assessment   Cardiac (WDL):  WDL Except

## 2023-08-29 NOTE — THERAPY
"Occupational Therapy  Daily Treatment     Patient Name: Richard Hubbard II  Age:  48 y.o., Sex:  male  Medical Record #: [id number]  Today's Date: 8/29/2023     Precautions  Precautions: Fall Risk, Other (See Comments)  Comments: low BP, prothesis LLE         Subjective    \"I'm just so tired today.\"      Objective       08/29/23 1401   OT Charge Group   OT Neuromuscular Re-education / Balance (Units) 3   OT Therapy Activity (Units) 1   OT Total Time Spent   OT Individual Total Time Spent (Mins) 60   Cognition    Level of Consciousness Alert   Neuro-Muscular Treatments   Neuro-Muscular Treatments Biofeedback;Tapping;Vibration   Comments desensitization techniques and mirror therapy for phantom limb pains   Interdisciplinary Plan of Care Collaboration   Patient Position at End of Therapy Seated;Call Light within Reach;Tray Table within Reach;Phone within Reach         Assessment    Pt tolerated session fair. Pt very fatigued this session but pleasant and agreeable to OT session. Pt reported having phantom limb pains still. Educated pt on SI for desensitization and pt performed- vibration with various heads for different stimuli, rubbing stump with lotion, tapping end, rubbing various textures over stump. Pt able to tolerate all sensations except strong vibration, pt could only tolerate low vibration and difficult tolerating on shin bone and around shin. Pt completed mirror therapy- educated pt on use of mirror therapy and pt completed various exercises and movements with RLE while looking in mirror. Discussed how pt could perform this at home for HEP. Discussed cooking at home- pt's counter tops are high and having difficulty being able to reach everything and cut things up from w/c level. Pt reported using TV tray at home next to counter- discussed using dining room table or card table with larger small. Instructed pt to have wife take pictures for better visual of kitchen space to continue to brainstorm ideas. "     Strengths: Able to follow instructions, Alert and oriented, Effective communication skills, Independent prior level of function, Pleasant and cooperative, Supportive family, Willingly participates in therapeutic activities  Barriers: Decreased endurance, Fatigue, Generalized weakness, Impaired activity tolerance, Impaired balance    Plan  *cooking task/kitchen mobility from w/c level*   ADL's, IADL's at w/c level, standing bal/tolerance, strengthening/endurance, functional mobility    Occupational Therapy Goals (Active)       Problem: Functional Transfers       Dates: Start:  08/24/23         Goal: STG-Within one week, patient will transfer to tub/shower with supervision using w/c and tub txfr bench.        Dates: Start:  08/24/23               Problem: IADL's       Dates: Start:  08/24/23         Goal: STG-Within one week, patient will access kitchen area at supv level using AE as needed.        Dates: Start:  08/24/23               Problem: OT Long Term Goals       Dates: Start:  08/21/23         Goal: LTG-By discharge, patient will complete basic self care tasks with mod I-SPV       Dates: Start:  08/21/23            Goal: LTG-By discharge, patient will perform bathroom transfers with mod I-SPV       Dates: Start:  08/21/23            Goal: LTG-By discharge, patient will complete basic home management with mod I-SPV from w/c level        Dates: Start:  08/21/23

## 2023-08-29 NOTE — THERAPY
Recreational Therapy  Daily Treatment     Patient Name: Richard Hubbard II  AGE:  48 y.o., SEX:  male  Medical Record #: [id number]  Today's Date: 8/29/2023       Subjective    Patient ready and agreeable to recreation therapy session.      Objective       08/29/23 0901   Procedural Tracking   Procedural Tracking Community Re-Integration;Community Skills Development;Leisure Skills Awareness;Leisure Skills Development;Social Skills Training;Cognitive Skills Training;Gross Motor Functional Leisure Skills;Fine Motor Functional Leisure Skills;Group Treatment   Treatment Time   Total Time Spent (mins) 30   Total Time Missed 0   Functional Ability Status - Cognitive   Attention Span Remains on Task   Comprehension Follows Two Step Commands;Follows One Step Commands   Judgment Able to Make Independent Decisions   Functional Ability Status - Emotional    Affect Appropriate   Mood Appropriate   Behavior Appropriate;Cooperative   Skilled Intervention    Skilled Intervention Gross Motor Leisure;Fine Motor Leisure;Cognitive Leisure   Interdisciplinary Plan of Care Collaboration   IDT Collaboration with  Occupational Therapist;Physical Therapist;Nursing   Patient Position at End of Therapy Seated;Phone within Reach;Tray Table within Reach;Call Light within Reach   Strengths & Barriers   Strengths Able to follow instructions;Alert and oriented;Effective communication skills;Willingly participates in therapeutic activities;Pleasant and cooperative   Barriers Decreased endurance;Fatigue;Generalized weakness;Impaired activity tolerance;Impaired balance;Impaired carryover of learning;Limited mobility;Pain         Assessment    Patient participated in new game, Cale, during recreation therapy session. Patient required mod cues for recall, strategy, sequencing, scanning and attention. Patient was mod I for fine motor aspects of activity.     Strengths: (P) Able to follow instructions, Alert and oriented, Effective communication  skills, Willingly participates in therapeutic activities, Pleasant and cooperative  Barriers: (P) Decreased endurance, Fatigue, Generalized weakness, Impaired activity tolerance, Impaired balance, Impaired carryover of learning, Limited mobility, Pain    Plan    Patient will benefit from continued recreation therapy sessions.     Passport items to be completed:  Verbalize two positive leisure activities, discuss returning to work, hobbies, community groups or volunteer activities, explore community resources

## 2023-08-29 NOTE — CARE PLAN
The patient is Stable - Low risk of patient condition declining or worsening    Problem: Knowledge Deficit - Standard  Goal: Patient and family/care givers will demonstrate understanding of plan of care, disease process/condition, diagnostic tests and medications  Outcome: Progressing. Reviewed POC, all questions answered.        Problem: Pain - Standard  Goal: Alleviation of pain or a reduction in pain to the patient’s comfort goal  Outcome: Progressing. Medicated per MAR and repositioned for comfort.       Shift Goals  Clinical Goals: Safety  Patient Goals: Participate in therapy, pain management  Family Goals: Education

## 2023-08-29 NOTE — THERAPY
Physical Therapy   Daily Treatment     Patient Name: Richard Hubbard II  Age:  48 y.o., Sex:  male  Medical Record #: [id number]  Today's Date: 8/29/2023     Precautions  Precautions: Fall Risk, Other (See Comments)  Comments: low BP, prothesis LLE    Subjective    Patient is found seated up in chair, agreeable to participate     Objective       08/29/23 1231   PT Charge Group   PT Therapeutic Exercise (Units) 4   PT Total Time Spent   PT Individual Total Time Spent (Mins) 60   Supine Lower Body Exercise   Bridges Two Legged;2 sets of 15  (LLE on bolster)   Hip Abduction Hook Lying;2 sets of 15;Bilateral   Straight Leg Raises Back ;Front;2 sets of 15   Short Arc Quad 2 sets of 15;Bilateral  (RLE 4# ankle weight)   Other Exercises prone hip extension 2 x15, prone hamstring curls 2x15, prone Supermans (bilateral hip extension and shoulder flexion in prone) 2 x 10   Bed Mobility    Supine to Sit Supervised   Sit to Supine Supervised   Sit to Stand Supervised   Interdisciplinary Plan of Care Collaboration   Patient Position at End of Therapy Seated;Call Light within Reach;Tray Table within Reach;Phone within Reach;Self Releasing Lap Belt Applied         Assessment    Patient demonstrates good recall of mat table program and requires little to no cuing for set up and exercises. He demonstrates good transfer safety, good safety awareness with the wheelchair. He is safe to return to home when medically cleared.     Strengths: Able to follow instructions, Independent prior level of function, Motivated for self care and independence, Pleasant and cooperative, Supportive family, Willingly participates in therapeutic activities  Barriers: Decreased endurance, Fatigue, Generalized weakness, Hypotension, Impaired balance, Impaired activity tolerance, Limited mobility (ill fitting prosthesis, poly-joint RA)    Plan    Wc endurance, sit<>stand and progressive   Supine/ sidelying and prone mat program.  Posterior chain  strength  Standing balance unsupported  Prosthetic gait training.     Passport items to be completed:  Get in/out of bed safely, in/out of a vehicle, safely use mobility device, walk or wheel around home/community, navigate up and down stairs, show how to get up/down from the ground, ensure home is accessible, demonstrate HEP, complete caregiver training    Physical Therapy Problems (Active)       Problem: Balance       Dates: Start:  08/21/23         Goal: STG-Within one week, patient will maintain static standing 2 minutes x 3 with  UE support at // bars for wt shifting/ prosthesis education and limb desensitization       Dates: Start:  08/21/23               Problem: Mobility Transfers       Dates: Start:  08/21/23         Goal: STG-Within one week, patient will perform bed mobility modified independent       Dates: Start:  08/21/23               Problem: PT-Long Term Goals       Dates: Start:  08/21/23         Goal: LTG-By discharge, patient will tolerate standing 5 minutes x 2 for wt shifting/ balance training and prosthesis training/ tolerance       Dates: Start:  08/21/23            Goal: LTG-By discharge, patient will propel wheelchair modified independent 50 ft x 2       Dates: Start:  08/21/23            Goal: LTG-By discharge, patient will ambulate SBA with FWW/ L prosthesis x 50 ft       Dates: Start:  08/21/23            Goal: LTG-By discharge, patient will transfer one surface to another modified independent with or without prosthesis       Dates: Start:  08/21/23            Goal: LTG-By discharge, patient will transfer in/out of a car modified independent with or without prosthesis       Dates: Start:  08/21/23

## 2023-08-29 NOTE — CARE PLAN
"  Problem: Pain - Standard  Goal: Alleviation of pain or a reduction in pain to the patient’s comfort goal  Outcome: Progressing  Note: Assessed for pain and discomfort, pt on pain mgt for gen body pain with telief[ see mar]     Problem: Fall Risk - Rehab  Goal: Patient will remain free from falls  Outcome: Progressing  Note: Osiris Healy Fall risk Assessment Score: 16    High fall risk Interventions   - Alarming seatbelt  -- Bed and strip alarm   - Yellow sign by the door   - Yellow wrist band \"Fall risk\"  - Room near to the nurse station  - Do not leave patient unattended in the bathroom  - Fall risk education provided       The patient is Stable - Low risk of patient condition declining or worsening    Shift Goals  Clinical Goals: pain management  Patient Goals: pain management  Family Goals: Education    Progress made toward(s) clinical / shift goals:  Pt free from fall and injury/    "

## 2023-08-29 NOTE — DISCHARGE PLANNING
Case Management/IDT follow up.   IDT continues to recommend IRF level of care as patient continue to make progress with all therapies.   Projected dc date set for: 09/04/2023      DC needs:   Recommendations made for home health for PT/OT/RN  Follow up with: pcp (patient will make that appointment, [facility], [facility])      Met with pt / family providing update from IDT and discussed plan of care. Patient is concerned about the d/c date, family is out of town until that night.     Plan:  Continue to follow

## 2023-08-29 NOTE — PROGRESS NOTES
"  Physical Medicine & Rehabilitation Progress Note    Encounter Date: 8/29/2023    Chief Complaint: Shoulder pain    Interval Events (Subjective):  ISSAC COTTER 8/29  VV    Seen and examined in his room. Pain in shoulders is still very bothersome and not improving. He is due for Humira tomorrow per chart review. Having regular BMs. Worried about receiving pain medications and getting pain appointment.     Objective:  VITAL SIGNS: BP 98/54   Pulse 87   Temp 36.3 °C (97.4 °F) (Oral)   Resp 18   Ht 1.803 m (5' 11\")   Wt 86.1 kg (189 lb 13.1 oz)   SpO2 91%   BMI 26.47 kg/m²     GEN: No apparent distress  HEENT: Head normocephalic, atraumatic.  Sclera nonicteric bilaterally, no ocular discharge appreciated bilaterally.  CV: Extremities warm and well-perfused, no peripheral edema appreciated bilaterally.  PULMONARY: Breathing nonlabored on room air, no respiratory accessory muscle use.  Not requiring supplemental oxygen.  SKIN: No appreciable skin breakdown on exposed areas of skin.  PSYCH: Mood and affect within normal limits.  NEURO: Awake alert.  Conversational.  Logical thought content.  MSK: L BKA amputation.       Laboratory Values:  Recent Results (from the past 72 hour(s))   CBC WITH DIFFERENTIAL    Collection Time: 08/29/23  6:10 AM   Result Value Ref Range    WBC 6.3 4.8 - 10.8 K/uL    RBC 3.40 (L) 4.70 - 6.10 M/uL    Hemoglobin 9.4 (L) 14.0 - 18.0 g/dL    Hematocrit 30.4 (L) 42.0 - 52.0 %    MCV 89.4 81.4 - 97.8 fL    MCH 27.6 27.0 - 33.0 pg    MCHC 30.9 (L) 32.3 - 36.5 g/dL    RDW 51.7 (H) 35.9 - 50.0 fL    Platelet Count 205 164 - 446 K/uL    MPV 9.9 9.0 - 12.9 fL    Neutrophils-Polys 58.30 44.00 - 72.00 %    Lymphocytes 22.30 22.00 - 41.00 %    Monocytes 11.10 0.00 - 13.40 %    Eosinophils 6.70 0.00 - 6.90 %    Basophils 1.40 0.00 - 1.80 %    Immature Granulocytes 0.20 0.00 - 0.90 %    Nucleated RBC 0.00 0.00 - 0.20 /100 WBC    Neutrophils (Absolute) 3.68 1.82 - 7.42 K/uL    Lymphs (Absolute) 1.41 1.00 - " 4.80 K/uL    Monos (Absolute) 0.70 0.00 - 0.85 K/uL    Eos (Absolute) 0.42 0.00 - 0.51 K/uL    Baso (Absolute) 0.09 0.00 - 0.12 K/uL    Immature Granulocytes (abs) 0.01 0.00 - 0.11 K/uL    NRBC (Absolute) 0.00 K/uL   Basic Metabolic Panel    Collection Time: 08/29/23  6:10 AM   Result Value Ref Range    Sodium 136 135 - 145 mmol/L    Potassium 4.4 3.6 - 5.5 mmol/L    Chloride 105 96 - 112 mmol/L    Co2 23 20 - 33 mmol/L    Glucose 79 65 - 99 mg/dL    Bun 16 8 - 22 mg/dL    Creatinine 0.87 0.50 - 1.40 mg/dL    Calcium 8.3 (L) 8.5 - 10.5 mg/dL    Anion Gap 8.0 7.0 - 16.0   ESTIMATED GFR    Collection Time: 08/29/23  6:10 AM   Result Value Ref Range    GFR (CKD-EPI) 107 >60 mL/min/1.73 m 2   OCCULT BLOOD X2 (STOOL)    Collection Time: 08/29/23  8:45 AM   Result Value Ref Range    Occult Blood 1 Negative Negative       Medications:  Scheduled Medications   Medication Dose Frequency    predniSONE  10 mg BID    ferric gluconate (Ferrlecit) IV  250 mg BID    pregabalin  150 mg TID    fluconazole  200 mg DAILY    acetaminophen  1,000 mg TID    lidocaine  2 Patch Q24HR    vitamin D3  1,000 Units DAILY    senna-docusate  2 Tablet BID    omeprazole  20 mg DAILY    dapagliflozin propanediol  10 mg DAILY    DULoxetine  60 mg DAILY    morphine  30 mg 4X/DAY    morphine ER  15 mg BID    melatonin  3 mg QHS     PRN medications: albuterol, zolpidem, Respiratory Therapy Consult, senna-docusate **AND** polyethylene glycol/lytes **AND** magnesium hydroxide **AND** bisacodyl, mag hydrox-al hydrox-simeth, sodium chloride, naloxone    Diet:  Current Diet Order   Procedures    Diet Order Diet: Regular (For 8/23/23 breakfast, pt is requesting victor, eggs and pancakes.  Plus with all meals, he would like 2 cokes on his trays.  Thank you.)       Medical Decision Making and Plan:      CHF exacerbation  - Originally presented with shortness of breath and pulmonary edema as well as cardiorenal syndrome  -While at [facility], patient was on IV  Lasix for diuresis  - Cardiology was consulted at [facility] for patient's known aortic and mitral regurgitation, EF 55%  - Patient has been transferred on torsemide 40 mg daily, discussed new medication with [facility] hospitalist Dr. Wynne.  - Consult hospitalist  - Continue farxiga   -PT and OT for mobility and ADLs. Per guidelines, 15 hours per week between PT, OT and/or SLP.  -Follow-up with cardiology     Hypokalemia  - Transferred on potassium 10 mg daily. K + normal. Off of supplement. 8/29 BMP with normal K+.      Anxiety depression  - Continue Cymbalta      Anemia  - worse on 8/24/23, 9.7 from 11.3. Held heparin as history of GI bleeds. FOBT negative 8/25 x1, repeat PENDING. H/H stable in 9's. IV iron 8/28. CBC 8/29 - stable 9.4 Monitor.      Insomnia  - Avoiding trazodone, patient had prolonged QTc from prior EKG  - Replace standing as needed trazodone for melatonin. Poor improvement with melatonin. Will trial Ambien as has tolerated in the past.      Rheumatoid arthritis  - Is on Humira q. 2 weeks  - Last dose 8/16--> due 8/30  -Has previously been on steroids, [facility], may consider restarting if patient has an RA flare  --Restart Prednisone, shoulder pain limiting. Start prednisone 10mg BID.      History of left BKA  - Previously at rehab in March/April 2023  - Monitor for edema control and residual limb for proper fit with prosthesis, wife will be bringing left BKA prosthesis     Chronic pain  - Chronically followed by pain management - [facility]  - Is on home dose morphine 30 mg 4 times a day as well as MS Contin 15 mg twice daily  - Do not anticipate patient will need increased as needed pain medications  - Naloxone as needed narcotics overdose. Start Lyrica 100 mg TID. 8/28 Increase Lyrica to 150mg TID. 8/29 Continue Lyrica 150mg TID.      Vitamin D deficiency  21 on admission.  - Start 1000 U      Bladder   - Miguel Frias'd 8/25/2023;  Voiding volitionally.     Skin - Patient at risk for skin breakdown due to debility in areas including sacrum, achilles, elbows and head in addition to other sites. Nursing to assess skin daily.      GI Ppx - Patient on Prilosec for GERD prophylaxis. Patient on Senna-docusate for constipation prophylaxis.       DVT PROPHYLAXIS: None - had been on Heparin, d/c 8/24 due to ambulating > 125 feet and having h/o GI bleed and bleeding day of 8/24    HOSPITALIST FOLLOWING: Yes    CODE STATUS: FULL CODE  ____________________________________    Dr. Shaista Guerra DO, MS  [facility] - Physical Medicine & Rehabilitation   ____________________________________

## 2023-08-29 NOTE — DOCUMENTATION QUERY
[facility]                                                                       Query Response Note      PATIENT:               LESLIE BADILLO  ACCT #:                  [id number]  MRN:                     [id number]  :                      1975  ADMIT DATE:       2023 1:35 PM  DISCH DATE:        2023 1:43 PM  RESPONDING  PROVIDER #:        [id number]           QUERY TEXT:    Patient admitted with Acute on Chronic HFpEF. Sepsis due to PNA is documented in the ED note on  and patient received empiric abx and a 500cc bolus.    Lactic acid and Procal elevated and ED provider linked KATERYNA and transaminitis to Sepsis, however only 1/4 SIRS was met on admission with RR>20.     Sepsis and PNA diagnoses are not included in subsequent documentation and abx not continued during inpatient stay.       Please clarify the status of Sepsis and PNA.      The patient's clinical indicators include:  49 yo M admitted with Acute on Chronic HFpEF    Clinical Indicators:  Labs on admission: WBC11.2; Lactic Acid 3.2-->2.4; Procalcitonin 2.55  -Vitals on admission: T 36.0; RR 30; HR 75  -Blood cultures NGTD  - CXR: Diffuse mixed interstitial and airspace opacities with enlarged cardiac silhouette could be related to edema, however multifocal pneumonia is not excluded.    -ED note: Sepsis due to pneumonia  -H&P:  ill-appearing and toxic-appearing.    Risk Factors: Immunocompromised state, Acute on chronic HFpEF, KATERYNA on CKD    Treatments: 500cc LR bolus and 1 dose each azithromycin and Zosyn in ED, labs, blood cultures      Contact me with any questions.    Thank you for your time and attention,  Emmy Wright RN, [email or web address]  Connect via email, Voalte or messenger.  Options provided:   -- Sepsis and PNA ruled out   -- Sepsis due to PNA exists, (please provide additional SIRS)   -- Other explanation, (please specify other  explanation)      Query created by: Emmy Wright on 8/25/2023 2:54 PM    RESPONSE TEXT:    Sepsis and PNA ruled out          Electronically signed by:  ASHWINI FORDE MD 8/29/2023 7:38 AM

## 2023-08-29 NOTE — THERAPY
"Occupational Therapy  Daily Treatment     Patient Name: Richard Hubbard II  Age:  48 y.o., Sex:  male  Medical Record #: [id number]  Today's Date: 8/29/2023     Precautions  Precautions: (P) Fall Risk, Other (See Comments)  Comments: (P) low BP, prothesis LLE         Subjective    \"I would like to work on legs\"     Objective       08/29/23 1101   OT Charge Group   Charges Yes   OT Therapeutic Exercise (Units) 2   Precautions   Precautions Fall Risk;Other (See Comments)   Comments low BP, prothesis LLE   Pain   Pain Scales 0 to 10 Scale    Intervention Heat Applied   Pain 0 - 10 Group   Location Shoulder   Location Orientation Right   Pain Rating Scale (NPRS) 6   Functional Level of Assist   Bed, Chair, Wheelchair Transfer Contact Guard Assist   Interdisciplinary Plan of Care Collaboration   IDT Collaboration with  Other (See Comments)  (Xray tech)   Patient Position at End of Therapy Seated;Chair Alarm On;Other (Comments)  (x ray tech)   Collaboration Comments hand off to X ray for CXR   Strengths & Barriers   Strengths Able to follow instructions;Alert and oriented;Effective communication skills;Independent prior level of function;Pleasant and cooperative;Supportive family;Willingly participates in therapeutic activities   Barriers Decreased endurance;Fatigue;Generalized weakness;Impaired activity tolerance;Impaired balance     Supine leg press: 2x25 with 5 resistance bands on sled, 2x25 leg pulses with 3 resistance bands  Sitting hamstring curs: 2x25 with medium resistance bands    Assessment    Pt with good tolerance to OT treatment on this date focused on strength. Hot pack applied to R shoulder during session to alleviate reported pain. Pt demos good competency with LE HEP and is motivated to build strength.     Strengths: (P) Able to follow instructions, Alert and oriented, Effective communication skills, Independent prior level of function, Pleasant and cooperative, Supportive family, Willingly " participates in therapeutic activities  Barriers: (P) Decreased endurance, Fatigue, Generalized weakness, Impaired activity tolerance, Impaired balance    Plan    ADL's, IADL's at w/c level, standing bal/tolerance, strengthening/endurance, functional mobility    Occupational Therapy Goals (Active)       Problem: Functional Transfers       Dates: Start:  08/24/23         Goal: STG-Within one week, patient will transfer to tub/shower with supervision using w/c and tub txfr bench.        Dates: Start:  08/24/23               Problem: IADL's       Dates: Start:  08/24/23         Goal: STG-Within one week, patient will access kitchen area at supv level using AE as needed.        Dates: Start:  08/24/23               Problem: OT Long Term Goals       Dates: Start:  08/21/23         Goal: LTG-By discharge, patient will complete basic self care tasks with mod I-SPV       Dates: Start:  08/21/23            Goal: LTG-By discharge, patient will perform bathroom transfers with mod I-SPV       Dates: Start:  08/21/23            Goal: LTG-By discharge, patient will complete basic home management with mod I-SPV from w/c level        Dates: Start:  08/21/23

## 2023-08-29 NOTE — ASSESSMENT & PLAN NOTE
Echo EF 55%, RVSP 50 mmHg, mod MR/AS/AR  S/P IV Lasix then changed to PO Torsemide prior to Rehab admission  Torsemide discontinued for hypotension  Also on Farxiga  CXR 8/29/23 worsening pulm edema  Resumed low dose Lasix and diuresing well (I/O negative 2 to 3 L every 24 hours)  F/U CXR 9/1/23 persistent pulm edema, atx  Continue to diuresis as tolerated  Also on IS  Needs outpt Cardiology F/U

## 2023-08-29 NOTE — THERAPY
Physical Therapy   Daily Treatment     Patient Name: Richard Hubbard II  Age:  48 y.o., Sex:  male  Medical Record #: [id number]  Today's Date: 8/29/2023     Precautions  Precautions: (P) Fall Risk, Other (See Comments)  Comments: (P) low BP, prothesis LLE    Subjective    Pt acknowledged ongoing functional weakness, attributed to challenges during transfers.      Objective       08/29/23 0931   PT Charge Group   PT Neuromuscular Re-Education / Balance (Units) 1   PT Therapeutic Activities (Units) 1   PT Total Time Spent   PT Individual Total Time Spent (Mins) 30   Precautions   Precautions Fall Risk;Other (See Comments)   Comments low BP, prothesis LLE   Wheelchair Functional Level of Assist   Wheelchair Assist Supervised   Distance Wheelchair (Feet or Distance) 150   Wheelchair Description Adaptive equipment   Transfer Functional Level of Assist   Bed, Chair, Wheelchair Transfer Minimal Assist  (wc <> standard bed, removal of arm rest, cues for using mini squat technique to transfer (emphasis on glute activation/ body mechanics))   Bed Chair Wheelchair Transfer Description Non-hospital bed;Increased time;Initial preparation for task;Set-up of equipment;Squat pivot transfer to wheelchair   Supine Lower Body Exercise   Bridges Two Legged;2 sets of 10   Gluteal Isometrics 1 set of 10;Bilateral   Comments Sidelying hip extension BLE 10x 2, AAROM RLE d/t longer lever   Bed Mobility    Supine to Sit Supervised   Sit to Supine Supervised   Scooting Supervised   Rolling Supervised   Neuro-Muscular Treatments   Neuro-Muscular Treatments Weight Shift Left;Weight Shift Right;Verbal Cuing;Tactile Cuing;Sequencing   Interdisciplinary Plan of Care Collaboration   IDT Collaboration with  Recreation Therapist   Patient Position at End of Therapy Seated;Other (Comments);Call Light within Reach;Tray Table within Reach;Phone within Reach  (declined use of self releasing lap belt)   Collaboration Comments Handoff from rec  therapist/ POC         Assessment    Pt participated in standard bed transfer training (without bed rail), and posterior chain supine/ side lying exercise. Pt attempted seated side scooting to emphasis mini squats/ triceps press body mechanics for functional strengthening (min A). Pt chose not to don prosthetic this session.        Strengths: Able to follow instructions, Independent prior level of function, Motivated for self care and independence, Pleasant and cooperative, Supportive family, Willingly participates in therapeutic activities  Barriers: Decreased endurance, Fatigue, Generalized weakness, Hypotension, Impaired balance, Impaired activity tolerance, Limited mobility (ill fitting prosthesis, poly-joint RA)    Plan    Wc endurance, sit<>stand and progressive   Supine/ sidelying and prone mat program.  Posterior chain strength  Standing balance unsupported  Prosthetic gait training.     Passport items to be completed:  Get in/out of bed safely, in/out of a vehicle, safely use mobility device, walk or wheel around home/community, navigate up and down stairs, show how to get up/down from the ground, ensure home is accessible, demonstrate HEP, complete caregiver training    Physical Therapy Problems (Active)       Problem: Balance       Dates: Start:  08/21/23         Goal: STG-Within one week, patient will maintain static standing 2 minutes x 3 with  UE support at // bars for wt shifting/ prosthesis education and limb desensitization       Dates: Start:  08/21/23               Problem: Mobility Transfers       Dates: Start:  08/21/23         Goal: STG-Within one week, patient will perform bed mobility modified independent       Dates: Start:  08/21/23               Problem: PT-Long Term Goals       Dates: Start:  08/21/23         Goal: LTG-By discharge, patient will tolerate standing 5 minutes x 2 for wt shifting/ balance training and prosthesis training/ tolerance       Dates: Start:  08/21/23             Goal: LTG-By discharge, patient will propel wheelchair modified independent 50 ft x 2       Dates: Start:  08/21/23            Goal: LTG-By discharge, patient will ambulate SBA with FWW/ L prosthesis x 50 ft       Dates: Start:  08/21/23            Goal: LTG-By discharge, patient will transfer one surface to another modified independent with or without prosthesis       Dates: Start:  08/21/23            Goal: LTG-By discharge, patient will transfer in/out of a car modified independent with or without prosthesis       Dates: Start:  08/21/23

## 2023-08-30 NOTE — THERAPY
Occupational Therapy  Daily Treatment     Patient Name: Richard Hubbard II  Age:  48 y.o., Sex:  male  Medical Record #: [id number]  Today's Date: 2023     Precautions  Precautions: (P) Fall Risk, Other (See Comments)  Comments: low BP, prothesis LLE         Subjective    Patient was seated in w/c in room talking with prosthetist.  He was agreeable to OT.       Objective       23 1231   OT Charge Group   OT Therapy Activity (Units) 1   OT Therapeutic Exercise (Units) 3   OT Total Time Spent   OT Individual Total Time Spent (Mins) 60   Precautions   Precautions Fall Risk;Other (See Comments)   Sitting Upper Body Exercises   Tricep Press Bilateral;Weight (See Comments for lbs)  (3 sets of 20 reps on rickshaw with 40 lbs)   Supine Lower Body Exercise   Other Exercises shuttle used for R quad/glut strengthenin bands for 3 x 20 and 5 bands for 4 x 20.   Interdisciplinary Plan of Care Collaboration   IDT Collaboration with  Other (See Comments)   Patient Position at End of Therapy Seated  (taking self back to room)   Collaboration Comments prosthetist in room upon OT arrival; collaborated with her and patient about new prosthetic and offloading shoe     SPT w/c <> nustep with close SBA.    W/C mobility in room, hallways, gym with mod I.    Assessment    Patient tolerated all activities well and without complaints.  He enjoys the rickshaw and shuttle very much.  Strengths: Able to follow instructions, Alert and oriented, Effective communication skills, Independent prior level of function, Pleasant and cooperative, Supportive family, Willingly participates in therapeutic activities  Barriers: Decreased endurance, Fatigue, Generalized weakness, Impaired activity tolerance, Impaired balance    Plan    *cooking task/kitchen mobility from w/c level*   ADL's, IADL's at w/c level, standing bal/tolerance, strengthening/endurance, functional mobility    Occupational Therapy Goals (Active)       Problem: Functional  Transfers       Dates: Start:  08/24/23         Goal: STG-Within one week, patient will transfer to tub/shower with supervision using w/c and tub txfr bench.        Dates: Start:  08/24/23               Problem: IADL's       Dates: Start:  08/24/23         Goal: STG-Within one week, patient will access kitchen area at supv level using AE as needed.        Dates: Start:  08/24/23               Problem: OT Long Term Goals       Dates: Start:  08/21/23         Goal: LTG-By discharge, patient will complete basic self care tasks with mod I-SPV       Dates: Start:  08/21/23            Goal: LTG-By discharge, patient will perform bathroom transfers with mod I-SPV       Dates: Start:  08/21/23            Goal: LTG-By discharge, patient will complete basic home management with mod I-SPV from w/c level        Dates: Start:  08/21/23

## 2023-08-30 NOTE — CARE PLAN
"  Problem: Pain - Standard  Goal: Alleviation of pain or a reduction in pain to the patient’s comfort goal  8/30/2023 0202 by ERIC RodriguezN.  Outcome: Progressing  Note: Assessed for pain and discomfort, pain under control, on pain mgt.[ See mar], Cont monitored. Pt with left BKA with prosthesis.  8/30/2023 0152 by ERIC RodriguezN.  Outcome: Progressing   The patient is Stable - Low risk of patient condition declining or worsening    Shift Goals  Clinical Goals: Safety  Patient Goals: Participate in therapy, pain management  Family Goals: Education    Progress made toward(s) clinical / shift goals:  Pt free from fall and injury.    Problem: Fall Risk - Rehab  Goal: Patient will remain free from falls  8/30/2023 0202 by ERIC RodriguezN.  Outcome: Not Progressing  Note: Osiris Healy Fall risk Assessment Score: 16    High fall risk Interventions   --- Bed and strip alarm   - Yellow sign by the door   - Yellow wrist band \"Fall risk\"  - Room near to the nurse station  - Do not leave patient unattended in the bathroom  - Fall risk education provided      Pt high risk for fall, refused the use of strip alarm and chair alarm ,  reinforced compliance to safety instructions. Cont monitored.  "

## 2023-08-30 NOTE — PROGRESS NOTES
"  Physical Medicine & Rehabilitation Progress Note    Encounter Date: 8/30/2023    Chief Complaint: Pain improved    Interval Events (Subjective):  ISSAC COTTER 8/29  VV    Seen and examined in the therapy gym. He is doing well. Pain improved with steroids. Ok doing Humira at home as this is not in pharmacy.     Objective:  VITAL SIGNS: /66   Pulse 88   Temp 36.5 °C (97.7 °F) (Oral)   Resp 18   Ht 1.803 m (5' 11\")   Wt 86.2 kg (190 lb 0.6 oz)   SpO2 94%   BMI 26.50 kg/m²     GEN: No apparent distress  HEENT: Head normocephalic, atraumatic.  Sclera nonicteric bilaterally, no ocular discharge appreciated bilaterally.  CV: Extremities warm and well-perfused, no peripheral edema appreciated bilaterally.  PULMONARY: Breathing nonlabored on room air, no respiratory accessory muscle use.  Not requiring supplemental oxygen.  SKIN: No appreciable skin breakdown on exposed areas of skin.  PSYCH: Mood and affect within normal limits.  NEURO: Awake alert.  Conversational.  Logical thought content.  MSK: L BKA      Laboratory Values:  Recent Results (from the past 72 hour(s))   CBC WITH DIFFERENTIAL    Collection Time: 08/29/23  6:10 AM   Result Value Ref Range    WBC 6.3 4.8 - 10.8 K/uL    RBC 3.40 (L) 4.70 - 6.10 M/uL    Hemoglobin 9.4 (L) 14.0 - 18.0 g/dL    Hematocrit 30.4 (L) 42.0 - 52.0 %    MCV 89.4 81.4 - 97.8 fL    MCH 27.6 27.0 - 33.0 pg    MCHC 30.9 (L) 32.3 - 36.5 g/dL    RDW 51.7 (H) 35.9 - 50.0 fL    Platelet Count 205 164 - 446 K/uL    MPV 9.9 9.0 - 12.9 fL    Neutrophils-Polys 58.30 44.00 - 72.00 %    Lymphocytes 22.30 22.00 - 41.00 %    Monocytes 11.10 0.00 - 13.40 %    Eosinophils 6.70 0.00 - 6.90 %    Basophils 1.40 0.00 - 1.80 %    Immature Granulocytes 0.20 0.00 - 0.90 %    Nucleated RBC 0.00 0.00 - 0.20 /100 WBC    Neutrophils (Absolute) 3.68 1.82 - 7.42 K/uL    Lymphs (Absolute) 1.41 1.00 - 4.80 K/uL    Monos (Absolute) 0.70 0.00 - 0.85 K/uL    Eos (Absolute) 0.42 0.00 - 0.51 K/uL    Baso " (Absolute) 0.09 0.00 - 0.12 K/uL    Immature Granulocytes (abs) 0.01 0.00 - 0.11 K/uL    NRBC (Absolute) 0.00 K/uL   Basic Metabolic Panel    Collection Time: 08/29/23  6:10 AM   Result Value Ref Range    Sodium 136 135 - 145 mmol/L    Potassium 4.4 3.6 - 5.5 mmol/L    Chloride 105 96 - 112 mmol/L    Co2 23 20 - 33 mmol/L    Glucose 79 65 - 99 mg/dL    Bun 16 8 - 22 mg/dL    Creatinine 0.87 0.50 - 1.40 mg/dL    Calcium 8.3 (L) 8.5 - 10.5 mg/dL    Anion Gap 8.0 7.0 - 16.0   ESTIMATED GFR    Collection Time: 08/29/23  6:10 AM   Result Value Ref Range    GFR (CKD-EPI) 107 >60 mL/min/1.73 m 2   OCCULT BLOOD X2 (STOOL)    Collection Time: 08/29/23  8:45 AM   Result Value Ref Range    Occult Blood 1 Negative Negative       Medications:  Scheduled Medications   Medication Dose Frequency    predniSONE  10 mg BID    omeprazole  20 mg BID    furosemide  20 mg Q DAY    ferric gluconate (Ferrlecit) IV  250 mg BID    pregabalin  150 mg TID    fluconazole  200 mg DAILY    acetaminophen  1,000 mg TID    lidocaine  2 Patch Q24HR    vitamin D3  1,000 Units DAILY    senna-docusate  2 Tablet BID    dapagliflozin propanediol  10 mg DAILY    DULoxetine  60 mg DAILY    morphine  30 mg 4X/DAY    morphine ER  15 mg BID    melatonin  3 mg QHS     PRN medications: albuterol, zolpidem, Respiratory Therapy Consult, senna-docusate **AND** polyethylene glycol/lytes **AND** magnesium hydroxide **AND** bisacodyl, mag hydrox-al hydrox-simeth, sodium chloride, naloxone    Diet:  Current Diet Order   Procedures    Diet Order Diet: Regular (For 8/23/23 breakfast, pt is requesting victor, eggs and pancakes.  Plus with all meals, he would like 2 cokes on his trays.  Thank you.)       Medical Decision Making and Plan:      CHF exacerbation  - Originally presented with shortness of breath and pulmonary edema as well as cardiorenal syndrome  -While at [facility], patient was on IV Lasix for diuresis  - Cardiology was consulted at [facility] for  patient's known aortic and mitral regurgitation, EF 55%  - Patient has been transferred on torsemide 40 mg daily, discussed new medication with [facility] hospitalist Dr. Wynne.  - Consult hospitalist  - Continue farxiga   -PT and OT for mobility and ADLs. Per guidelines, 15 hours per week between PT, OT and/or SLP.  -Follow-up with cardiology     Hypokalemia  - Transferred on potassium 10 mg daily. K + normal. Off of supplement. 8/29 BMP with normal K+.      Anxiety depression  - Continue Cymbalta      Anemia  - worse on 8/24/23, 9.7 from 11.3. Held heparin as history of GI bleeds. FOBT negative 8/25 x1, repeat PENDING. H/H stable in 9's. IV iron 8/28. CBC 8/29 - stable 9.4 Monitor.      Insomnia  - Avoiding trazodone, patient had prolonged QTc from prior EKG  - Replace standing as needed trazodone for melatonin. Poor improvement with melatonin. Will trial Ambien as has tolerated in the past.      Rheumatoid arthritis  - Is on Humira q. 2 weeks  - Last dose 8/16--> due 8/30 (has to have home supply, not in pharmacy formulary)  - Has previously been on steroids, has not been transferred to [facility] on steroids, may consider restarting if patient has an RA flare  - Restart Prednisone, shoulder pain limiting. Start prednisone 10mg BID 8/29. Pain improved 8/30 continue scheduled steroids.      History of left BKA  - Previously at rehab in March/April 2023  - Monitor for edema control and residual limb for proper fit with prosthesis, wife will be bringing left BKA prosthesis     Chronic pain  - Chronically followed by pain management - [facility]  - Is on home dose morphine 30 mg 4 times a day as well as MS Contin 15 mg twice daily  - Do not anticipate patient will need increased as needed pain medications  - Naloxone as needed narcotics overdose. Start Lyrica 100 mg TID. 8/28 Increase Lyrica to 150mg TID. 8/30 continue Lyrica 150mg TID.      Vitamin D deficiency  21 on admission.  - Start 1000 U       Bladder   - Rivera Dc'd 8/25/2023; Voiding volitionally.     Skin - Patient at risk for skin breakdown due to debility in areas including sacrum, achilles, elbows and head in addition to other sites. Nursing to assess skin daily.      GI Ppx - Patient on Prilosec for GERD prophylaxis. Patient on Senna-docusate for constipation prophylaxis.       DVT PROPHYLAXIS: None - had been on Heparin, d/c 8/24 due to ambulating > 125 feet and having h/o GI bleed and bleeding day of 8/24    HOSPITALIST FOLLOWING: Yes    CODE STATUS: FULL CODE    DISPO: Home with family. Has equipment. Family not available for  until Tuesday night.     JESSICA: 9/5/23 due to pharmacy closures for holiday and availability to get a ride.   ____________________________________    Dr. Shaista Guerra DO, MS  [facility] - Physical Medicine & Rehabilitation   ____________________________________

## 2023-08-30 NOTE — PROGRESS NOTES
Hospital Medicine Daily Progress Note        Chief Complaint:  Volume overload    Interval History:  Pt reports right sided chest pain better today.  Discussed CXR results.    Review of Systems  Review of Systems   Constitutional:  Negative for chills and fever.   HENT: Negative.     Eyes: Negative.    Respiratory:  Negative for cough and shortness of breath.    Cardiovascular:  Negative for chest pain and palpitations.   Gastrointestinal:  Negative for abdominal pain, nausea and vomiting.   Genitourinary: Negative.    Musculoskeletal: Negative.    Skin:  Negative for itching and rash.   Endo/Heme/Allergies:  Negative for polydipsia. Does not bruise/bleed easily.        Physical Exam  Temp:  [36.4 °C (97.5 °F)-36.8 °C (98.3 °F)] 36.5 °C (97.7 °F)  Pulse:  [] 88  Resp:  [18-20] 18  BP: (104-115)/(58-74) 104/66  SpO2:  [92 %-96 %] 94 %    Physical Exam  Vitals reviewed.   Constitutional:       General: He is not in acute distress.     Appearance: Normal appearance. He is not ill-appearing.   HENT:      Head: Normocephalic and atraumatic.      Right Ear: External ear normal.      Left Ear: External ear normal.      Nose: Nose normal.      Mouth/Throat:      Pharynx: Oropharynx is clear.   Eyes:      General:         Right eye: No discharge.         Left eye: No discharge.      Extraocular Movements: Extraocular movements intact.      Conjunctiva/sclera: Conjunctivae normal.   Cardiovascular:      Rate and Rhythm: Normal rate and regular rhythm.   Pulmonary:      Effort: No respiratory distress.      Breath sounds: No wheezing.      Comments: Faint crackles  Abdominal:      General: Bowel sounds are normal. There is no distension.      Palpations: Abdomen is soft.      Tenderness: There is no abdominal tenderness.   Musculoskeletal:      Cervical back: Normal range of motion and neck supple.      Right lower leg: No edema.      Comments: L BKA   Skin:     General: Skin is warm and dry.   Neurological:      Mental  Status: He is alert and oriented to person, place, and time.         Fluids    Intake/Output Summary (Last 24 hours) at 8/30/2023 1259  Last data filed at 8/30/2023 0900  Gross per 24 hour   Intake 1560 ml   Output 4300 ml   Net -2740 ml       Laboratory  Recent Labs     08/29/23  0610   WBC 6.3   RBC 3.40*   HEMOGLOBIN 9.4*   HEMATOCRIT 30.4*   MCV 89.4   MCH 27.6   MCHC 30.9*   RDW 51.7*   PLATELETCT 205   MPV 9.9     Recent Labs     08/29/23  0610   SODIUM 136   POTASSIUM 4.4   CHLORIDE 105   CO2 23   GLUCOSE 79   BUN 16   CREATININE 0.87   CALCIUM 8.3*                 Assessment/Plan  Cardiac volume overload  Assessment & Plan  Echo EF 55%, RVSP 50 mmHg, mod MR/AS/AR  S/P IV Lasix then changed to PO Torsemide prior to Rehab admission  Torsemide discontinued last week for hypotension  Also on Farxiga  CXR now shows worsening pulm edema  Resumed diuretics w/ low dose Lasix  Needs outpt Cardiology F/U    Anemia  Assessment & Plan  Fe 32, S/P IV Fe  FOB negative x 2 thus far  Follow H/H    History of gastric bypass  Assessment & Plan  Monitor electrolytes    S/P BKA (below knee amputation) unilateral, left (HCC)- (present on admission)  Assessment & Plan  Ambulates with prosthesis    Vitamin D deficiency- (present on admission)  Assessment & Plan  Vit D level 21  On supplementation    Chronic pain syndrome- (present on admission)  Assessment & Plan  On Morphine  Management per Physiatry    History of atrial fibrillation ( single episode 2019)- (present on admission)  Assessment & Plan  S/P cardioversion in 2019  On no anti-plts or AC due to h/o GIB    Rheumatoid arthritis (HCC)- (present on admission)  Assessment & Plan  Presently on Prednisone  Also chronically on Humira  Rheumatology F/U    History of bleeding ulcers- (present on admission)  Assessment & Plan  Continue PPI bid       Full Code

## 2023-08-30 NOTE — THERAPY
Physical Therapy   Daily Treatment     Patient Name: Richard Hubbard II  Age:  48 y.o., Sex:  male  Medical Record #: [id number]  Today's Date: 8/30/2023     Precautions  Precautions: Fall Risk, Other (See Comments)  Comments: low BP, prothesis LLE    Subjective    Pt agreeable to practice core and strengthening exercises     Objective       08/30/23 1101   PT Charge Group   PT Therapeutic Exercise (Units) 1   PT Neuromuscular Re-Education / Balance (Units) 1   Supervising Physical Therapist Nazanin Wade   PT Total Time Spent   PT Individual Total Time Spent (Mins) 30   Wheelchair Functional Level of Assist   Wheelchair Assist Modified Independent   Distance Wheelchair (Feet or Distance) 125x2   Wheelchair Description Adaptive equipment;Extra time   Sitting Lower Body Exercises   Sitting Lower Body Exercises Yes   Bilateral Row 2 sets of 15;Bilateral;Medium Resistance Theraband   Neuro-Muscular Treatments   Neuro-Muscular Treatments Anterior weight shift;Facilitation;Verbal Cuing   Comments unsupported sitting EOM with emphasis on postural re education, core activation, neutral pelvis and breathing   Interdisciplinary Plan of Care Collaboration   Patient Position at End of Therapy Seated;Self Releasing Lap Belt Applied;Call Light within Reach  (to room for lung in )     Seated exercises EOM with #6lb weighted ball  2 x 20 chest press  2 x 20 diagonals/trunk rotation  Pt transfers on/off mat with SPV    Assessment    Tx session focusing on posterior chain strengthening and unsupported sitting balance EOM. Requires vc/tc for proper form and eccentric control. Pt requested information on obtaining a sitting arrangement similar to the one his is currently using at rehab, information was printed and pt was educated alternative options as the cost of the stump board set up may not be economical for the patient. Pt appreciative   Strengths: Able to follow instructions, Independent prior level of function, Motivated  for self care and independence, Pleasant and cooperative, Supportive family, Willingly participates in therapeutic activities  Barriers: Decreased endurance, Fatigue, Generalized weakness, Hypotension, Impaired balance, Impaired activity tolerance, Limited mobility (ill fitting prosthesis, poly-joint RA)    Plan    Wc endurance, sit<>stand and progressive   Supine/ sidelying and prone mat program.  Posterior chain strength  Standing balance unsupported  Prosthetic gait training.      Passport items to be completed:  Get in/out of bed safely, in/out of a vehicle, safely use mobility device, walk or wheel around home/community, navigate up and down stairs, show how to get up/down from the ground, ensure home is accessible, demonstrate HEP, complete caregiver training    Physical Therapy Problems (Active)       Problem: Balance       Dates: Start:  08/21/23         Goal: STG-Within one week, patient will maintain static standing 2 minutes x 3 with  UE support at // bars for wt shifting/ prosthesis education and limb desensitization       Dates: Start:  08/21/23               Problem: Mobility Transfers       Dates: Start:  08/21/23         Goal: STG-Within one week, patient will perform bed mobility modified independent       Dates: Start:  08/21/23               Problem: PT-Long Term Goals       Dates: Start:  08/21/23         Goal: LTG-By discharge, patient will tolerate standing 5 minutes x 2 for wt shifting/ balance training and prosthesis training/ tolerance       Dates: Start:  08/21/23            Goal: LTG-By discharge, patient will propel wheelchair modified independent 50 ft x 2       Dates: Start:  08/21/23            Goal: LTG-By discharge, patient will ambulate SBA with FWW/ L prosthesis x 50 ft       Dates: Start:  08/21/23            Goal: LTG-By discharge, patient will transfer one surface to another modified independent with or without prosthesis       Dates: Start:  08/21/23            Goal: LTG-By  discharge, patient will transfer in/out of a car modified independent with or without prosthesis       Dates: Start:  08/21/23

## 2023-08-30 NOTE — PROGRESS NOTES
NURSING DAILY NOTE    Name: Richard Hubbard II   Date of Admission: 8/20/2023   Admitting Diagnosis: Acute diastolic heart failure (HCC)  Attending Physician: Shaista Guerra D.o.  Allergies: Nsaids, Diphenhydramine, Mirtazapine, Penicillins, and Promethazine    Safety  Patient Assist  min assist  Patient Precautions  Fall Risk, Other (See Comments)  Precaution Comments  low BP, prothesis LLE  Bed Transfer Status  Contact Guard Assist  Toilet Transfer Status   Contact Guard Assist  Assistive Devices  Rails, Wheelchair  Oxygen  None - Room Air  Diet/Therapeutic Dining  Current Diet Order   Procedures    Diet Order Diet: Regular (For 8/23/23 breakfast, pt is requesting victor, eggs and pancakes.  Plus with all meals, he would like 2 cokes on his trays.  Thank you.)     Pill Administration  whole  Agitated Behavioral Scale     ABS Level of Severity       Fall Risk  Has the patient had a fall this admission?   No  Osiris Healy Fall Risk Scoring  16, HIGH RISK  Fall Risk Safety Measures  bed alarm, chair alarm, and poor balance    Vitals  Temperature: 36.5 °C (97.7 °F)  Temp src: Oral  Pulse: 88  Respiration: 18  Blood Pressure: 104/66  Blood Pressure MAP (Calculated): 79 MM HG  BP Location: Left, Upper Arm  Patient BP Position: Supine     Oxygen  Pulse Oximetry: 94 %  O2 (LPM): 0  FiO2%: 21 %  O2 Delivery Device: None - Room Air    Bowel and Bladder  Last Bowel Movement  08/29/23  Stool Type  Type 2: Sausage shaped, but lumpy  Bowel Device  Bathroom  Continent  Bladder: Continent void (Rivera catheter)   Bowel: Continent movement  Bladder Function  Urine Void (mL): 200 ml  Number of Times Voided: 1  Urine Color: Yellow  Genitourinary Assessment   Bladder Assessment (WDL):  Within Defined Limits  Rivera Catheter: Present with Active Order  Rivera Reasons per MD Order: Acute urinary retention or bladder outlet obstruction  Rivera Care: Given with Soap and  Water  Urinary Elimination: Catheter (Document on LDA)  Urine Color: Yellow  Bladder Device: Indwelling Catheter    Skin  Nam Score   18  Sensory Interventions   Skin Preventative Measures: Pillows in Use for Support / Positioning  Moisture Interventions  Moisturizers/Barriers: Barrier Wipes  Containment Devices: Indwelling Catheter      Pain  Pain Rating Scale  1 - Hardly Notice Pain  Pain Location  Shoulder, Knee, Ankle  Pain Location Orientation  Right  Pain Interventions   Declines    ADLs    Bathing   Patient Refused Bathing (claims he got shower this Da y shift)  Linen Change   Partial  Personal Hygiene  Change Charlotte Pads  Chlorhexidine Bath      Oral Care  Brushed Teeth  Teeth/Dentures  Broken Teeth (Comments)  Shave  Self  Nutrition Percentage Eaten  Between 50-75% Consumed  Environmental Precautions  Bed in Low Position, Treaded Slipper Socks on Patient  Patient Turns/Positioning  Patient Turns Self from Side to Side  Patient Turns Assistance/Tolerance     Bed Positions  Bed Controls On  Head of Bed Elevated  Self regulated      Psychosocial/Neurologic Assessment  Psychosocial Assessment  Psychosocial (WDL):  Within Defined Limits  Patient Behaviors: Irritable  Neurologic Assessment  Neuro (WDL): Within Defined Limits  Level of Consciousness: Alert  Orientation Level: Oriented X4  Cognition: Follows commands  Speech: Clear  Pupil Assesment: Yes  R Pupil Size (mm): 2  R Pupil Shape / Description: Round  R Pupil Reaction: Brisk  L Pupil Size (mm): 2  L Pupil Shape / Description: Round  L Pupil Reaction: Brisk  Motor Function/Sensation Assessment: Sensation  RUE Sensation: Full sensation  LUE Sensation: Full sensation  RLE Sensation: Full sensation  LLE Sensation: Full sensation  EENT (WDL):  WDL Except    Cardio/Pulmonary Assessment  Edema      Respiratory Breath Sounds  RUL Breath Sounds: Clear  RML Breath Sounds: Clear  RLL Breath Sounds: Clear  DONALD Breath Sounds: Clear  LLL Breath Sounds: Clear  Cardiac  Assessment   Cardiac (WDL):  WDL Except

## 2023-08-30 NOTE — PROGRESS NOTES
NURSING DAILY NOTE    Name: Richard Hubbard II   Date of Admission: 8/20/2023   Admitting Diagnosis: Acute diastolic heart failure (HCC)  Attending Physician: Shaista Guerra D.o.  Allergies: Nsaids, Diphenhydramine, Mirtazapine, Penicillins, and Promethazine    Safety  Patient Assist  min assist  Patient Precautions  Fall Risk, Other (See Comments)  Precaution Comments  low BP, prothesis LLE  Bed Transfer Status  Contact Guard Assist  Toilet Transfer Status   Contact Guard Assist  Assistive Devices  Wheelchair  Oxygen  None - Room Air  Diet/Therapeutic Dining  Current Diet Order   Procedures    Diet Order Diet: Regular (For 8/23/23 breakfast, pt is requesting victor, eggs and pancakes.  Plus with all meals, he would like 2 cokes on his trays.  Thank you.)     Pill Administration  whole  Agitated Behavioral Scale     ABS Level of Severity       Fall Risk  Has the patient had a fall this admission?   No  Osiris Healy Fall Risk Scoring  16, HIGH RISK  Fall Risk Safety Measures  bed alarm and chair alarm    Vitals  Temperature: 36.4 °C (97.5 °F)  Temp src: Oral  Pulse: 76  Respiration: 20  Blood Pressure: 104/64  Blood Pressure MAP (Calculated): 77 MM HG  BP Location: Upper Arm, Left  Patient BP Position: Sitting     Oxygen  Pulse Oximetry: 96 %  O2 (LPM): 0  FiO2%: 21 %  O2 Delivery Device: None - Room Air    Bowel and Bladder  Last Bowel Movement  08/26/23  Stool Type  Type 2: Sausage shaped, but lumpy  Bowel Device  Bathroom  Continent  Bladder: Continent void (Rivera catheter)   Bowel: Continent movement  Bladder Function  Urine Void (mL): 500 ml  Number of Times Voided: 1  Urine Color: Yellow  Genitourinary Assessment   Bladder Assessment (WDL):  Within Defined Limits  Rivera Catheter: Present with Active Order  Rivera Reasons per MD Order: Acute urinary retention or bladder outlet obstruction  Rivera Care: Given with Soap and Water  Urinary Elimination:  Catheter (Document on LDA)  Urine Color: Yellow  Bladder Device: Indwelling Catheter    Skin  Nam Score   18  Sensory Interventions   Skin Preventative Measures: Pillows in Use for Support / Positioning  Moisture Interventions  Moisturizers/Barriers: Barrier Wipes  Containment Devices: Indwelling Catheter      Pain  Pain Rating Scale  7 - Focus of attention, prevents doing daily activities  Pain Location  Shoulder  Pain Location Orientation  Right  Pain Interventions   Heat Applied    ADLs    Bathing   Patient Refused Bathing (claims he got shower this Da y shift)  Linen Change   Partial  Personal Hygiene  Change Charlotte Pads  Chlorhexidine Bath      Oral Care  Brushed Teeth  Teeth/Dentures  Broken Teeth (Comments)  Shave  Self  Nutrition Percentage Eaten  Between 50-75% Consumed  Environmental Precautions  Bed in Low Position, Treaded Slipper Socks on Patient  Patient Turns/Positioning  Patient Turns Self from Side to Side  Patient Turns Assistance/Tolerance     Bed Positions  Bed Controls On  Head of Bed Elevated  Self regulated      Psychosocial/Neurologic Assessment  Psychosocial Assessment  Psychosocial (WDL):  Within Defined Limits  Patient Behaviors: Irritable  Neurologic Assessment  Neuro (WDL): Within Defined Limits  Level of Consciousness: Alert  Orientation Level: Oriented X4  Cognition: Follows commands  Speech: Clear  Pupil Assesment: Yes  R Pupil Size (mm): 2  R Pupil Shape / Description: Round  R Pupil Reaction: Brisk  L Pupil Size (mm): 2  L Pupil Shape / Description: Round  L Pupil Reaction: Brisk  Motor Function/Sensation Assessment: Sensation  RUE Sensation: Full sensation  LUE Sensation: Full sensation  RLE Sensation: Full sensation  LLE Sensation: Full sensation  EENT (WDL):  WDL Except    Cardio/Pulmonary Assessment  Edema      Respiratory Breath Sounds  RUL Breath Sounds: Clear  RML Breath Sounds: Clear  RLL Breath Sounds: Clear  DONALD Breath Sounds: Clear  LLL Breath Sounds: Clear  Cardiac  Assessment   Cardiac (WDL):  WDL Except

## 2023-08-30 NOTE — FLOWSHEET NOTE
08/29/23 1704   Events/Summary/Plan   Events/Summary/Plan SVN given with albuterol   Vital Signs   Pulse 76   Respiration 20   Pulse Oximetry 96 %   $ Pulse Oximetry (Spot Check) Yes   Respiratory Assessment   Respiratory Pattern Within Normal Limits   Level of Consciousness Alert   Chest Exam   Work Of Breathing / Effort Within Normal Limits   Breath Sounds   RUL Breath Sounds Clear   RML Breath Sounds Clear   RLL Breath Sounds Clear   DONALD Breath Sounds Clear   LLL Breath Sounds Clear   Secretions   Cough Strong;Non Productive   Oxygen   O2 Delivery Device None - Room Air

## 2023-08-30 NOTE — CARE PLAN
Problem: Self Care  Goal: Patient will have the ability to perform ADLs independently or with assistance  Outcome: Not Met  Note: Patient able to perform regular activities this shift.  Pain controlled this shift.  Pain management includes PRN pain meds as well as non-pharmacological measures such as emotional support, rest, and repositioning.  Will continue to monitor.    The patient is Stable - Low risk of patient condition declining or worsening    Shift Goals  Clinical Goals: Safety  Patient Goals: Participate in therapy, pain management  Family Goals: Education    Progress made toward(s) clinical / shift goals:  pt participates with therapy and is cooperative with nursing    Patient is not progressing towards the following goals:      Problem: Self Care  Goal: Patient will have the ability to perform ADLs independently or with assistance  Outcome: Not Met  Note: Patient able to perform regular activities this shift.  Pain controlled this shift.  Pain management includes PRN pain meds as well as non-pharmacological measures such as emotional support, rest, and repositioning.  Will continue to monitor.

## 2023-08-30 NOTE — THERAPY
Physical Therapy   Daily Treatment     Patient Name: Richard Hubbard II  Age:  48 y.o., Sex:  male  Medical Record #: [id number]  Today's Date: 8/30/2023     Precautions  Precautions: Fall Risk, Other (See Comments)  Comments: low BP, prothesis LLE    Subjective    Patient is agreeable to participate with therapy, has his original prosthetic leg donned for morning session. Has received new prosthetic leg for afternoon session, willing to work on walking and assess for potential areas of irritation.      Objective       08/30/23 0931 08/30/23 1415   PT Charge Group   PT Gait Training (Units) 2 3   PT Therapeutic Exercise (Units) 1  --    PT Total Time Spent   PT Individual Total Time Spent (Mins) 45 45   Gait Functional Level of Assist    Gait Level Of Assist Contact Guard Assist Contact Guard Assist   Assistive Device Front Wheel Walker Front Wheel Walker   Distance (Feet) 150 120   # of Times Distance was Traveled 2 2   Deviation Antalgic;Bradykinetic Antalgic;Bradykinetic   Sitting Lower Body Exercises   Sit to Stand 2 sets of 10  (progressively lower mat table without UE support (25 inches to 20 inches))  --    Interdisciplinary Plan of Care Collaboration   Patient Position at End of Therapy Seated;Call Light within Reach;Tray Table within Reach;Phone within Reach Seated         Assessment    Spent morning session working on sit to stand transfers, gait with FWW for endurance/ postural awareness. Afternoon session was working on identifying potential areas of irritation in new offloading shoe and new prosthetic. Identified some areas of irritation in offloading shoe, made adjustments to allow for reduced weight bearing through foot wound. Patient reports feeling less cushioned in new prosthetic simply because he only has one 5ply sock on, rather than total of 33 ply with old limb. Endurance has improved markedly, is expected to be safe to return to home when medically cleared.     Strengths: Able to follow  instructions, Independent prior level of function, Motivated for self care and independence, Pleasant and cooperative, Supportive family, Willingly participates in therapeutic activities  Barriers: Decreased endurance, Fatigue, Generalized weakness, Hypotension, Impaired balance, Impaired activity tolerance, Limited mobility (ill fitting prosthesis, poly-joint RA)    Plan    Core/ posterior chain strengthening, balance, cardiovascular endurance     Passport items to be completed:   in/out of a vehicle, ,  show how to get up/down from the ground, , complete caregiver training    Physical Therapy Problems (Active)       Problem: Balance       Dates: Start:  08/21/23         Goal: STG-Within one week, patient will maintain static standing 2 minutes x 3 with  UE support at // bars for wt shifting/ prosthesis education and limb desensitization       Dates: Start:  08/21/23               Problem: Mobility Transfers       Dates: Start:  08/21/23         Goal: STG-Within one week, patient will perform bed mobility modified independent       Dates: Start:  08/21/23               Problem: PT-Long Term Goals       Dates: Start:  08/21/23         Goal: LTG-By discharge, patient will tolerate standing 5 minutes x 2 for wt shifting/ balance training and prosthesis training/ tolerance       Dates: Start:  08/21/23            Goal: LTG-By discharge, patient will propel wheelchair modified independent 50 ft x 2       Dates: Start:  08/21/23            Goal: LTG-By discharge, patient will ambulate SBA with FWW/ L prosthesis x 50 ft       Dates: Start:  08/21/23            Goal: LTG-By discharge, patient will transfer one surface to another modified independent with or without prosthesis       Dates: Start:  08/21/23            Goal: LTG-By discharge, patient will transfer in/out of a car modified independent with or without prosthesis       Dates: Start:  08/21/23

## 2023-08-31 NOTE — CARE PLAN
"  Problem: Pain - Standard  Goal: Alleviation of pain or a reduction in pain to the patient’s comfort goal  Outcome: Progressing  Note: Assessed for pain and injury , pt on pain mgt for shoulder and right leg pain with relief [ see mar] .     Problem: Fall Risk - Rehab  Goal: Patient will remain free from falls  Outcome: Progressing  Note: Osiris Healy Fall risk Assessment Score: 16    High fall risk Interventions     - Bed and strip alarm   - Yellow sign by the door   - Yellow wrist band \"Fall risk\"  - Room near to the nurse station  - Do not leave patient unattended in the bathroom  - Fall risk education provided       The patient is Stable - Low risk of patient condition declining or worsening    Shift Goals  Clinical Goals: Safety  Patient Goals: Participate in therapy, pain management  Family Goals: Education    Progress made toward(s) clinical / shift goals:  Pt free from and injury.    "

## 2023-08-31 NOTE — CARE PLAN
Problem: Functional Transfers  Goal: STG-Within one week, patient will transfer to tub/shower with supervision using w/c and tub txfr bench.   8/31/2023 1105 by Aury Barbosa, OT  Outcome: Progressing  Note: Stand by  8/31/2023 1103 by Aury Barbosa, OT  Outcome: Progressing     Problem: IADL's  Goal: STG-Within one week, patient will access kitchen area at supv level using AE as needed.   8/31/2023 1105 by Aury Barbosa, OT  Outcome: Progressing  Note: Stand by  8/31/2023 1103 by Aury Barbosa, OT  Outcome: Progressing

## 2023-08-31 NOTE — THERAPY
"Physical Therapy   Daily Treatment     Patient Name: Richard Hubbard II  Age:  48 y.o., Sex:  male  Medical Record #: [id number]  Today's Date: 8/31/2023     Precautions  Precautions: Fall Risk, Other (See Comments)  Comments: Low BP, prosthesis LLE    Subjective    Patient is agreeable to participate with therapy \"It's your party\", would like to go outside     Objective       08/31/23 0915 08/31/23 1301   PT Charge Group   PT Gait Training (Units) 4  --    PT Neuromuscular Re-Education / Balance (Units)  --  2   PT Total Time Spent   PT Individual Total Time Spent (Mins) 60 30   Gait Functional Level of Assist    Gait Level Of Assist Contact Guard Assist  --    Assistive Device Front Wheel Walker  --    Distance (Feet) 195  --    # of Times Distance was Traveled 3  --    Deviation Antalgic;Bradykinetic  --    Stairs Functional Level of Assist   Level of Assist with Stairs Contact Guard Assist  --    # of Stairs Climbed 12  --    Stairs Description Artifical limb(s);Extra time;Hand rails;Limited by fatigue;Supervision for safety;Verbal cueing;Walker;Safety concerns  (cues for \"up with the good, down with the bad\", attempts to perform reciprocal stair climbing. Demonstrates more stability when ascending with RLE leading, descending with LLE leading.)  --    Outcome Measures   Outcome Measures Utilized  --  Mcleroy Balance Scale   Mcelroy Balance Scale   Sitting Unsupported (Score 0-4)  --  4   Change Of Positon: Sitting To Standing (Score 0-4)  --  4   Change Of Positon: Standing To Sitting (Score 0-4)  --  4   Transfers (Score 0-4)  --  3   Standing Unsupported (Score 0-4)  --  4   Standing With Eyes Closed (Score 0-4)  --  3   Standing With Feet Together (Score 0-4)  --  2   Tandem Standing (Score 0-4)  --  2   Standing On One Leg (Score 0-4)  --  1   Turning Trunk (Feet Fixed) (Score 0-4)  --  4   Retrieving Objects From Floor (Score 0-4)  --  4   Turning 360 Degrees (Score 0-4)  --  2   Stool Stepping (Score 0-4)  " --  3   Reaching Forward While Standing (Score 0-4)  --  2   Mcelroy Balance Total Score (0-56)  --  42   Interdisciplinary Plan of Care Collaboration   IDT Collaboration with  Nursing  --    Patient Position at End of Therapy Seated;Call Light within Reach;Tray Table within Reach;Phone within Reach Seated;Call Light within Reach;Tray Table within Reach;Phone within Reach   Collaboration Comments RN provided meds during PT session  --      Ambulates all 585 ft outdoors over uneven ground with seated rest breaks every 195 ft.     Assessment    Mcelroy balance score places patient in high fall risk category. He is making good progress with ambulation over uneven ground, tolerates more activity between rest breaks than earlier during stay. He is expected to continue making functional gains, is safe to return to home by anticipated discharge date.    Strengths: Able to follow instructions, Independent prior level of function, Motivated for self care and independence, Pleasant and cooperative, Supportive family, Willingly participates in therapeutic activities  Barriers: Decreased endurance, Fatigue, Generalized weakness, Hypotension, Impaired balance, Impaired activity tolerance, Limited mobility (ill fitting prosthesis, poly-joint RA)    Plan    Wc endurance, sit<>stand and progressive   Supine/ sidelying and prone mat program.  Posterior chain strength  Standing balance unsupported  Prosthetic gait training.     Passport items to be completed:  Get in/out of bed safely, in/out of a vehicle, safely use mobility device, walk or wheel around home/community, navigate up and down stairs, show how to get up/down from the ground, ensure home is accessible, demonstrate HEP, complete caregiver training    Physical Therapy Problems (Active)       There are no active problems.

## 2023-08-31 NOTE — CARE PLAN
Problem: Pain - Standard  Goal: Alleviation of pain or a reduction in pain to the patient’s comfort goal  Outcome: Progressing  Patient on scheduled pain meds with good relief.     Problem: Fall Risk - Rehab  Goal: Patient will remain free from falls  Outcome: Progressing  Patient with good safety awareness, uses call light appropriately.   The patient is Stable - Low risk of patient condition declining or worsening    Shift Goals  Clinical Goals: Safety  Patient Goals: Participate in therapy, pain management  Family Goals: Education

## 2023-08-31 NOTE — THERAPY
Occupational Therapy  Daily Treatment     Patient Name: Richard Hubbard II  Age:  48 y.o., Sex:  male  Medical Record #: [id number]  Today's Date: 8/31/2023     Precautions  Precautions: (P) Fall Risk, Other (See Comments)  Comments: (P) Low BP, prosthesis LLE         Subjective    Pt sleeping comfortably at approach and startled awake. Alert and agreeable to ADL session this AM     Objective       08/31/23 0701   OT Charge Group   Charges Yes   OT Self Care / ADL (Units) 4   OT Total Time Spent   OT Individual Total Time Spent (Mins) 60   Precautions   Precautions Fall Risk;Other (See Comments)   Comments Low BP, prosthesis LLE   Cognition    Cognition / Consciousness WDL   Orientation Level Oriented x 4   Level of Consciousness Alert   Ability To Follow Commands 3 Step   Sleep/Wake Cycle   Sleep & Rest Asleep   Sleep Observations Alert upon awakening   Functional Level of Assist   Grooming Modified Independent  (oral hygiene, shaving, seated at sink)   Grooming Description Increased time;Seated in wheelchair at sink;Supervision for safety   Bathing Standby Assist  (full body shower)   Bathing Description Grab bar;Hand rails;Hand held shower;Long handled bath tool;Tub bench;Increased time;Set up for shower sleeve;Supervision for safety   Upper Body Dressing Modified Independent  (including item retreival)   Upper Body Dressing Description Increased time;Supervision for safety   Lower Body Dressing Standby Assist  (including item retreival and footwear)   Lower Body Dressing Description Grab bar;Increased time;Supervision for safety   Bed, Chair, Wheelchair Transfer Contact Guard Assist  (bed>w/c)   Bed Chair Wheelchair Transfer Description Adaptive equipment;Increased time;Squat pivot transfer to wheelchair;Supervision for safety   Tub / Shower Transfers Contact Guard Assist   Tub Shower Transfer Description Grab bar;Shower bench;Increased time;Supervision for safety   Bed Mobility    Supine to Sit Supervised    Interdisciplinary Plan of Care Collaboration   IDT Collaboration with  Nursing   Patient Position at End of Therapy Seated;Call Light within Reach;Tray Table within Reach   Collaboration Comments IV cover got wet during shower   Strengths & Barriers   Strengths Able to follow instructions;Alert and oriented;Effective communication skills;Independent prior level of function;Pleasant and cooperative;Supportive family;Willingly participates in therapeutic activities   Barriers Decreased endurance;Fatigue;Generalized weakness;Impaired activity tolerance;Impaired balance       Assessment    Pt with good tolerance to OT treatment on this date focused on ADLs. Pt continues to demonstrate good safety awareness during ADL tasks needing supervision and intermittent assistance for set up/clean up. He maximizes his independence with tasks by modifying ADLs to conserve energy and extend act tolerance. Suspect pt nearing his functional baseline.     Strengths: (P) Able to follow instructions, Alert and oriented, Effective communication skills, Independent prior level of function, Pleasant and cooperative, Supportive family, Willingly participates in therapeutic activities  Barriers: (P) Decreased endurance, Fatigue, Generalized weakness, Impaired activity tolerance, Impaired balance    Plan    *cooking task/kitchen mobility from w/c level*   ADL's, IADL's at w/c level, standing bal/tolerance, strengthening/endurance, functional mobility    Occupational Therapy Goals (Active)       Problem: Functional Transfers       Dates: Start:  08/24/23         Goal: STG-Within one week, patient will transfer to tub/shower with supervision using w/c and tub txfr bench.        Dates: Start:  08/24/23               Problem: IADL's       Dates: Start:  08/24/23         Goal: STG-Within one week, patient will access kitchen area at supv level using AE as needed.        Dates: Start:  08/24/23               Problem: OT Long Term Goals        Dates: Start:  08/21/23         Goal: LTG-By discharge, patient will complete basic self care tasks with mod I-SPV       Dates: Start:  08/21/23            Goal: LTG-By discharge, patient will perform bathroom transfers with mod I-SPV       Dates: Start:  08/21/23            Goal: LTG-By discharge, patient will complete basic home management with mod I-SPV from w/c level        Dates: Start:  08/21/23

## 2023-08-31 NOTE — CARE PLAN
Problem: Balance  Goal: STG-Within one week, patient will maintain static standing 2 minutes x 3 with  UE support at // bars for wt shifting/ prosthesis education and limb desensitization  Outcome: Met     Problem: Mobility Transfers  Goal: STG-Within one week, patient will perform bed mobility modified independent  Outcome: Met     Problem: PT-Long Term Goals  Goal: LTG-By discharge, patient will tolerate standing 5 minutes x 2 for wt shifting/ balance training and prosthesis training/ tolerance  Outcome: Met  Goal: LTG-By discharge, patient will propel wheelchair modified independent 50 ft x 2  Outcome: Met  Goal: LTG-By discharge, patient will ambulate SBA with FWW/ L prosthesis x 50 ft  Outcome: Met  Goal: LTG-By discharge, patient will transfer one surface to another modified independent with or without prosthesis  Outcome: Met  Goal: LTG-By discharge, patient will transfer in/out of a car modified independent with or without prosthesis  Outcome: Met

## 2023-08-31 NOTE — PROGRESS NOTES
Hospital Medicine Daily Progress Note        Chief Complaint:  Volume overload    Interval History:  No chest pain, shortness of breath, or palpitations.    Review of Systems  Review of Systems   Constitutional:  Negative for chills and fever.   HENT: Negative.     Eyes: Negative.    Respiratory:  Negative for cough and shortness of breath.    Cardiovascular:  Positive for leg swelling. Negative for chest pain and palpitations.   Gastrointestinal:  Negative for abdominal pain, nausea and vomiting.   Genitourinary: Negative.    Musculoskeletal: Negative.    Skin:  Negative for itching and rash.   Endo/Heme/Allergies:  Negative for polydipsia. Does not bruise/bleed easily.        Physical Exam  Temp:  [36.5 °C (97.7 °F)-36.6 °C (97.9 °F)] 36.5 °C (97.7 °F)  Pulse:  [84-94] 87  Resp:  [18] 18  BP: ()/(62-67) 104/67  SpO2:  [94 %-97 %] 94 %    Physical Exam  Vitals reviewed.   Constitutional:       General: He is not in acute distress.     Appearance: Normal appearance. He is not ill-appearing.   HENT:      Head: Normocephalic and atraumatic.      Right Ear: External ear normal.      Left Ear: External ear normal.      Nose: Nose normal.      Mouth/Throat:      Pharynx: Oropharynx is clear.   Eyes:      General:         Right eye: No discharge.         Left eye: No discharge.      Extraocular Movements: Extraocular movements intact.      Conjunctiva/sclera: Conjunctivae normal.   Cardiovascular:      Rate and Rhythm: Normal rate and regular rhythm.   Pulmonary:      Effort: No respiratory distress.      Breath sounds: No wheezing.      Comments: Faint crackles  Abdominal:      General: Bowel sounds are normal. There is no distension.      Palpations: Abdomen is soft.      Tenderness: There is no abdominal tenderness.   Musculoskeletal:      Cervical back: Normal range of motion and neck supple.      Right lower leg: Edema present.      Comments: L BKA   Skin:     General: Skin is warm and dry.   Neurological:       Mental Status: He is alert and oriented to person, place, and time.         Fluids    Intake/Output Summary (Last 24 hours) at 8/31/2023 1313  Last data filed at 8/31/2023 0937  Gross per 24 hour   Intake 360 ml   Output 3550 ml   Net -3190 ml       Laboratory  Recent Labs     08/29/23  0610   WBC 6.3   RBC 3.40*   HEMOGLOBIN 9.4*   HEMATOCRIT 30.4*   MCV 89.4   MCH 27.6   MCHC 30.9*   RDW 51.7*   PLATELETCT 205   MPV 9.9     Recent Labs     08/29/23  0610   SODIUM 136   POTASSIUM 4.4   CHLORIDE 105   CO2 23   GLUCOSE 79   BUN 16   CREATININE 0.87   CALCIUM 8.3*                 Assessment/Plan  Cardiac volume overload  Assessment & Plan  Echo EF 55%, RVSP 50 mmHg, mod MR/AS/AR  S/P IV Lasix then changed to PO Torsemide prior to Rehab admission  Torsemide discontinued last week for hypotension  Also on Farxiga  CXR now shows worsening pulm edema  Resumed diuretics w/ low dose Lasix  Needs outpt Cardiology F/U    Anemia  Assessment & Plan  Fe 32, S/P IV Fe  FOB negative x 2 thus far  Follow H/H  Check F/U labs in AM    History of gastric bypass  Assessment & Plan  Monitor electrolytes  Check F/U labs in AM    S/P BKA (below knee amputation) unilateral, left (HCC)- (present on admission)  Assessment & Plan  Ambulates with prosthesis    Vitamin D deficiency- (present on admission)  Assessment & Plan  Vit D level 21  On supplementation    Chronic pain syndrome- (present on admission)  Assessment & Plan  On Morphine  Management per Physiatry    History of atrial fibrillation ( single episode 2019)- (present on admission)  Assessment & Plan  S/P cardioversion in 2019  On no anti-plts or AC due to h/o GIB    Rheumatoid arthritis (HCC)- (present on admission)  Assessment & Plan  Presently on Prednisone  Also chronically on Humira  Rheumatology F/U    History of bleeding ulcers- (present on admission)  Assessment & Plan  Continue PPI bid       Full Code

## 2023-08-31 NOTE — PROGRESS NOTES
NURSING DAILY NOTE    Name: Richard Hubbard II   Date of Admission: 8/20/2023   Admitting Diagnosis: Acute diastolic heart failure (HCC)  Attending Physician: Shaista Guerra D.o.  Allergies: Nsaids, Diphenhydramine, Mirtazapine, Penicillins, and Promethazine    Safety  Patient Assist  min  Patient Precautions  Fall Risk, Other (See Comments)  Precaution Comments  Low BP, prosthesis LLE  Bed Transfer Status  Contact Guard Assist (bed>w/c)  Toilet Transfer Status   Contact Guard Assist  Assistive Devices  Wheelchair  Oxygen  None - Room Air  Diet/Therapeutic Dining  Current Diet Order   Procedures    Diet Order Diet: Regular (For 8/23/23 breakfast, pt is requesting victor, eggs and pancakes.  Plus with all meals, he would like 2 cokes on his trays.  Thank you.)     Pill Administration  whole  Agitated Behavioral Scale     ABS Level of Severity       Fall Risk  Has the patient had a fall this admission?   No  Osiris Healy Fall Risk Scoring  16, HIGH RISK  Fall Risk Safety Measures  bed alarm, chair alarm, and seatbelt alarm    Vitals  Temperature: 36.3 °C (97.4 °F)  Temp src: Oral  Pulse: 93  Respiration: 14  Blood Pressure: 114/66  Blood Pressure MAP (Calculated): 82 MM HG  BP Location: Right, Upper Arm  Patient BP Position: Supine     Oxygen  Pulse Oximetry: 96 %  O2 (LPM): 0  FiO2%: 21 %  O2 Delivery Device: None - Room Air    Bowel and Bladder  Last Bowel Movement  08/30/23  Stool Type  Type 5: Soft blob with clear cut edges (passed easily)  Bowel Device  Bathroom  Continent  Bladder: Continent void (Rivera catheter)   Bowel: Continent movement  Bladder Function  Urine Void (mL): 700 ml  Number of Times Voided: 1  Urine Color: Yellow  Genitourinary Assessment   Bladder Assessment (WDL):  WDL Except  Rivera Catheter: Not Applicable  Rivera Reasons per MD Order: Acute urinary retention or bladder outlet obstruction  Rivera Care: Given with Soap and  Water  Urinary Elimination: Catheter (Document on LDA)  Urine Color: Yellow  Bladder Device: Bathroom, Urinal    Skin  Nam Score   18  Sensory Interventions   Skin Preventative Measures: Pillows in Use for Support / Positioning  Moisture Interventions  Moisturizers/Barriers: Barrier Wipes  Containment Devices: Indwelling Catheter      Pain  Pain Rating Scale  8 - Awful, hard to do anything  Pain Location  Shoulder, Knee  Pain Location Orientation  Right  Pain Interventions   Medication (see MAR)    ADLs    Bathing   Patient Refused Bathing (notified RN Mary Beth)  Linen Change   Partial  Personal Hygiene  Change Charlotte Pads  Chlorhexidine Bath      Oral Care  Brushed Teeth  Teeth/Dentures  Broken Teeth (Comments)  Shave  Self  Nutrition Percentage Eaten  Between % Consumed  Environmental Precautions  Treaded Slipper Socks on Patient  Patient Turns/Positioning  Patient Turns Self from Side to Side  Patient Turns Assistance/Tolerance  Assistance of One  Bed Positions  Bed Controls On, Bed Locked  Head of Bed Elevated  Self regulated      Psychosocial/Neurologic Assessment  Psychosocial Assessment  Psychosocial (WDL):  Within Defined Limits  Patient Behaviors: Irritable  Neurologic Assessment  Neuro (WDL): Within Defined Limits  Level of Consciousness: Alert  Orientation Level: Oriented X4  Cognition: Follows commands  Speech: Clear  Pupil Assesment: No  R Pupil Size (mm): 2  R Pupil Shape / Description: Round  R Pupil Reaction: Brisk  L Pupil Size (mm): 2  L Pupil Shape / Description: Round  L Pupil Reaction: Brisk  Motor Function/Sensation Assessment: Sensation  RUE Sensation: Full sensation  LUE Sensation: Full sensation  RLE Sensation: Full sensation  LLE Sensation: Full sensation  EENT (WDL):  WDL Except    Cardio/Pulmonary Assessment  Edema      Respiratory Breath Sounds  RUL Breath Sounds: Diminished  RML Breath Sounds: Diminished  RLL Breath Sounds: Diminished  DONALD Breath Sounds: Clear  LLL Breath Sounds:  Clear  Cardiac Assessment   Cardiac (WDL):  WDL Except

## 2023-08-31 NOTE — PROGRESS NOTES
"  Physical Medicine & Rehabilitation Progress Note    Encounter Date: 8/31/2023    Chief Complaint: Pain better in leg    Interval Events (Subjective):  ISSAC COTTER 8/30  Voiding volitionally     Seen and examined in his room. He is on board with d/c plan. Steroids have helped leg pain.     Objective:  VITAL SIGNS: /67   Pulse 87   Temp 36.5 °C (97.7 °F) (Oral)   Resp 18   Ht 1.803 m (5' 11\")   Wt 89.5 kg (197 lb 5 oz)   SpO2 94%   BMI 27.52 kg/m²     GEN: No apparent distress  HEENT: Head normocephalic, atraumatic.  Sclera nonicteric bilaterally, no ocular discharge appreciated bilaterally.  CV: Extremities warm and well-perfused, no peripheral edema appreciated bilaterally.  PULMONARY: Breathing nonlabored on room air, no respiratory accessory muscle use.  Not requiring supplemental oxygen.  SKIN: No appreciable skin breakdown on exposed areas of skin.  PSYCH: Mood and affect within normal limits.  NEURO: Awake alert.  Conversational.  Logical thought content.  MSK: L BKA    Laboratory Values:  Recent Results (from the past 72 hour(s))   CBC WITH DIFFERENTIAL    Collection Time: 08/29/23  6:10 AM   Result Value Ref Range    WBC 6.3 4.8 - 10.8 K/uL    RBC 3.40 (L) 4.70 - 6.10 M/uL    Hemoglobin 9.4 (L) 14.0 - 18.0 g/dL    Hematocrit 30.4 (L) 42.0 - 52.0 %    MCV 89.4 81.4 - 97.8 fL    MCH 27.6 27.0 - 33.0 pg    MCHC 30.9 (L) 32.3 - 36.5 g/dL    RDW 51.7 (H) 35.9 - 50.0 fL    Platelet Count 205 164 - 446 K/uL    MPV 9.9 9.0 - 12.9 fL    Neutrophils-Polys 58.30 44.00 - 72.00 %    Lymphocytes 22.30 22.00 - 41.00 %    Monocytes 11.10 0.00 - 13.40 %    Eosinophils 6.70 0.00 - 6.90 %    Basophils 1.40 0.00 - 1.80 %    Immature Granulocytes 0.20 0.00 - 0.90 %    Nucleated RBC 0.00 0.00 - 0.20 /100 WBC    Neutrophils (Absolute) 3.68 1.82 - 7.42 K/uL    Lymphs (Absolute) 1.41 1.00 - 4.80 K/uL    Monos (Absolute) 0.70 0.00 - 0.85 K/uL    Eos (Absolute) 0.42 0.00 - 0.51 K/uL    Baso (Absolute) 0.09 0.00 - 0.12 K/uL "    Immature Granulocytes (abs) 0.01 0.00 - 0.11 K/uL    NRBC (Absolute) 0.00 K/uL   Basic Metabolic Panel    Collection Time: 08/29/23  6:10 AM   Result Value Ref Range    Sodium 136 135 - 145 mmol/L    Potassium 4.4 3.6 - 5.5 mmol/L    Chloride 105 96 - 112 mmol/L    Co2 23 20 - 33 mmol/L    Glucose 79 65 - 99 mg/dL    Bun 16 8 - 22 mg/dL    Creatinine 0.87 0.50 - 1.40 mg/dL    Calcium 8.3 (L) 8.5 - 10.5 mg/dL    Anion Gap 8.0 7.0 - 16.0   ESTIMATED GFR    Collection Time: 08/29/23  6:10 AM   Result Value Ref Range    GFR (CKD-EPI) 107 >60 mL/min/1.73 m 2   OCCULT BLOOD X2 (STOOL)    Collection Time: 08/29/23  8:45 AM   Result Value Ref Range    Occult Blood 1 Negative Negative       Medications:  Scheduled Medications   Medication Dose Frequency    predniSONE  10 mg BID    omeprazole  20 mg BID    furosemide  20 mg Q DAY    pregabalin  150 mg TID    acetaminophen  1,000 mg TID    lidocaine  2 Patch Q24HR    vitamin D3  1,000 Units DAILY    senna-docusate  2 Tablet BID    dapagliflozin propanediol  10 mg DAILY    DULoxetine  60 mg DAILY    morphine  30 mg 4X/DAY    morphine ER  15 mg BID    melatonin  3 mg QHS     PRN medications: albuterol, zolpidem, Respiratory Therapy Consult, senna-docusate **AND** polyethylene glycol/lytes **AND** magnesium hydroxide **AND** bisacodyl, mag hydrox-al hydrox-simeth, sodium chloride, naloxone    Diet:  Current Diet Order   Procedures    Diet Order Diet: Regular (For 8/23/23 breakfast, pt is requesting victor, eggs and pancakes.  Plus with all meals, he would like 2 cokes on his trays.  Thank you.)       Medical Decision Making and Plan:      CHF exacerbation  - Originally presented with shortness of breath and pulmonary edema as well as cardiorenal syndrome  -While at [facility], patient was on IV Lasix for diuresis  - Cardiology was consulted at [facility] for patient's known aortic and mitral regurgitation, EF 55%  - Patient has been transferred on torsemide 40 mg daily,  discussed new medication with [facility] hospitalist Dr. Wynne.  - Consult hospitalist  - Continue farxiga   -PT and OT for mobility and ADLs. Per guidelines, 15 hours per week between PT, OT and/or SLP.  -Follow-up with cardiology     Hypokalemia  - Transferred on potassium 10 mg daily. K + normal. Off of supplement. 8/29 BMP with normal K+.      Anxiety depression  - Continue Cymbalta      Anemia  - worse on 8/24/23, 9.7 from 11.3. Held heparin as history of GI bleeds. FOBT negative 8/25 x1, repeat PENDING. H/H stable in 9's. IV iron 8/28. CBC 8/29 - stable 9.4 Monitor.      Insomnia  - Avoiding trazodone, patient had prolonged QTc from prior EKG  - Replace standing as needed trazodone for melatonin. Poor improvement with melatonin. Will trial Ambien as has tolerated in the past.      Rheumatoid arthritis  - Is on Humira q. 2 weeks  - Last dose 8/16--> due 8/30 (has to have home supply, not in pharmacy formulary)  - Has previously been on steroids, has not been transferred to [facility] rehab on steroids, may consider restarting if patient has an RA flare  - Restart Prednisone, shoulder pain limiting. Start prednisone 10mg BID 8/29. Pain improved 8/30 continue scheduled steroids. 8/31 leg pain better on steroids. Continue scheduled steroids.      History of left BKA  - Previously at rehab in March/April 2023  - Monitor for edema control and residual limb for proper fit with prosthesis, wife will be bringing left BKA prosthesis     Chronic pain  - Chronically followed by pain management - [facility]  - Is on home dose morphine 30 mg 4 times a day as well as MS Contin 15 mg twice daily  - Do not anticipate patient will need increased as needed pain medications  - Naloxone as needed narcotics overdose. Start Lyrica 100 mg TID. 8/28 Increase Lyrica to 150mg TID. 8/30 continue Lyrica 150mg TID.      Vitamin D deficiency  21 on admission.  - Start 1000 U      Bladder   - Rivera Dc'd 8/25/2023; Voiding  volitionally.     Skin - Patient at risk for skin breakdown due to debility in areas including sacrum, achilles, elbows and head in addition to other sites. Nursing to assess skin daily.      GI Ppx - Patient on Prilosec for GERD prophylaxis. Patient on Senna-docusate for constipation prophylaxis.       DVT PROPHYLAXIS: None - had been on Heparin, d/c 8/24 due to ambulating > 125 feet and having h/o GI bleed and bleeding day of 8/24    HOSPITALIST FOLLOWING: Yes    CODE STATUS: FULL CODE    DISPO: Home with family. Has equipment. Family not available for  until Tuesday night.     JESSICA: 9/5/23 due to pharmacy closures for holiday and availability to get a ride.   ____________________________________    Dr. Shaista Guerra DO, MS  [facility] - Physical Medicine & Rehabilitation   ____________________________________      _____________________________________  Interdisciplinary Team Conference   Most recent IDT on 8/31/2023    I, Dr. Shaista Guerra DO, MS, was present and led the interdisciplinary team conference on 8/31/2023.  I led the IDT conference and agree with the IDT conference documentation and plan of care as noted below.     Nursing:  Diet Current Diet Order   Procedures    Diet Order Diet: Regular (For 8/23/23 breakfast, pt is requesting victor, eggs and pancakes.  Plus with all meals, he would like 2 cokes on his trays.  Thank you.)       Eating ADL Independent      % of Last Meal  Oral Nutrition: Between % Consumed   Sleep    Bowel Last BM: 08/30/23   Bladder      Physical Therapy:  Bed Mobility    Transfers Contact Guard Assist (bed>w/c)  Adaptive equipment, Increased time, Squat pivot transfer to wheelchair, Supervision for safety   Mobility Contact Guard Assist   Stairs      Walking a couple hundred feet at a time. Has done stairs.     Occupational Therapy:  Grooming Modified Independent (oral hygiene, shaving, seated at sink)   Bathing Standby Assist (full body shower)   UB Dressing  Modified Independent (including item retreival)   LB Dressing Standby Assist (including item retreival and footwear)   Toileting Maximal Assist   Shower & Transfer      Respiratory Therapy:  O2 (LPM): 0  O2 Delivery Device: None - Room Air    Case Management:  Continues to work on disposition and DME needs.     BARRIERS TO DISCHARGE HOME:     Endurance. Work on safety.   Discharge Date/Disposition:  9/5/23  _____________________________________

## 2023-08-31 NOTE — PROGRESS NOTES
NURSING DAILY NOTE    Name: Richard Hubbard II   Date of Admission: 8/20/2023   Admitting Diagnosis: Acute diastolic heart failure (HCC)  Attending Physician: Shaista Guerra D.o.  Allergies: Nsaids, Diphenhydramine, Mirtazapine, Penicillins, and Promethazine    Safety  Patient Assist  min  Patient Precautions  Fall Risk, Other (See Comments)  Precaution Comments  low BP, prothesis LLE  Bed Transfer Status  Contact Guard Assist  Toilet Transfer Status   Contact Guard Assist  Assistive Devices  Wheelchair, Rails  Oxygen  None - Room Air  Diet/Therapeutic Dining  Current Diet Order   Procedures    Diet Order Diet: Regular (For 8/23/23 breakfast, pt is requesting victor, eggs and pancakes.  Plus with all meals, he would like 2 cokes on his trays.  Thank you.)     Pill Administration  whole  Agitated Behavioral Scale     ABS Level of Severity       Fall Risk  Has the patient had a fall this admission?   No  Osiris Healy Fall Risk Scoring  16, HIGH RISK  Fall Risk Safety Measures  bed alarm, chair alarm, and poor balance    Vitals  Temperature: 36.6 °C (97.9 °F)  Temp src: Oral  Pulse: 84  Respiration: 18  Blood Pressure: 100/64  Blood Pressure MAP (Calculated): 76 MM HG  BP Location: Left, Upper Arm  Patient BP Position: Sitting     Oxygen  Pulse Oximetry: 97 %  O2 (LPM): 0  FiO2%: 21 %  O2 Delivery Device: None - Room Air    Bowel and Bladder  Last Bowel Movement  08/30/23  Stool Type  Type 5: Soft blob with clear cut edges (passed easily)  Bowel Device  Bathroom  Continent  Bladder: Continent void (Rivera catheter)   Bowel: Continent movement  Bladder Function  Urine Void (mL): 450 ml (urinal)  Number of Times Voided: 1  Urine Color: Yellow  Genitourinary Assessment   Bladder Assessment (WDL):  WDL Except  Rivera Catheter: Not Applicable  Rivera Reasons per MD Order: Acute urinary retention or bladder outlet obstruction  Rivera Care: Given with Soap and  Water  Urinary Elimination: Catheter (Document on LDA)  Urine Color: Yellow  Bladder Device: Bathroom, Urinal    Skin  Nam Score   18  Sensory Interventions   Skin Preventative Measures: Pillows in Use for Support / Positioning  Moisture Interventions  Moisturizers/Barriers: Barrier Wipes  Containment Devices: Indwelling Catheter      Pain  Pain Rating Scale  8 - Awful, hard to do anything  Pain Location  Shoulder, Knee  Pain Location Orientation  Right  Pain Interventions   Medication (see MAR)    ADLs    Bathing   Patient Refused Bathing (notified RN Mary Beth)  Linen Change   Partial  Personal Hygiene  Change Charlotte Pads  Chlorhexidine Bath      Oral Care  Brushed Teeth  Teeth/Dentures  Broken Teeth (Comments)  Shave  Self  Nutrition Percentage Eaten  Between % Consumed  Environmental Precautions  Treaded Slipper Socks on Patient  Patient Turns/Positioning  Patient Turns Self from Side to Side  Patient Turns Assistance/Tolerance  Assistance of One  Bed Positions  Bed Controls On, Bed Locked  Head of Bed Elevated  Self regulated      Psychosocial/Neurologic Assessment  Psychosocial Assessment  Psychosocial (WDL):  Within Defined Limits  Patient Behaviors: Irritable  Neurologic Assessment  Neuro (WDL): Within Defined Limits  Level of Consciousness: Alert  Orientation Level: Oriented X4  Cognition: Follows commands  Speech: Clear  Pupil Assesment: No  R Pupil Size (mm): 2  R Pupil Shape / Description: Round  R Pupil Reaction: Brisk  L Pupil Size (mm): 2  L Pupil Shape / Description: Round  L Pupil Reaction: Brisk  Motor Function/Sensation Assessment: Sensation  RUE Sensation: Full sensation  LUE Sensation: Full sensation  RLE Sensation: Full sensation  LLE Sensation: Full sensation  EENT (WDL):  WDL Except    Cardio/Pulmonary Assessment  Edema      Respiratory Breath Sounds  RUL Breath Sounds: Diminished  RML Breath Sounds: Diminished  RLL Breath Sounds: Diminished  DONALD Breath Sounds: Clear  LLL Breath Sounds:  Clear  Cardiac Assessment   Cardiac (WDL):  WDL Except (hx htn, chf, mitral and aortic valve regurgitation)

## 2023-08-31 NOTE — THERAPY
Occupational Therapy  Daily Treatment     Patient Name: Richard Hubbard II  Age:  48 y.o., Sex:  male  Medical Record #: [id number]  Today's Date: 8/31/2023     Precautions  Precautions: Fall Risk, Other (See Comments)  Comments: Low BP, prosthesis LLE         Subjective    Pt wanting to try leg press with prosthesis now that he has a new sock with better fitting shoe.      Objective       08/31/23 1431   OT Charge Group   Charges Yes   OT Therapeutic Exercise (Units) 2   OT Total Time Spent   OT Individual Total Time Spent (Mins) 30   Precautions   Precautions Fall Risk;Other (See Comments)   Comments Low BP, prosthesis LLE   Cognition    Cognition / Consciousness WDL   Orientation Level Oriented x 4   Level of Consciousness Alert   Ability To Follow Commands 3 Step   Supine Lower Body Exercise   Other Exercises shuttle used for ULE leg press. RLE 2x25 c/6 bands; LLE 2x25 c/3 bands   Interdisciplinary Plan of Care Collaboration   IDT Collaboration with  Nursing   Patient Position at End of Therapy Seated;[place] Vest Applied;Tray Table within Reach   Collaboration Comments CLOF, disposition   Strengths & Barriers   Strengths Able to follow instructions;Alert and oriented;Effective communication skills;Independent prior level of function;Pleasant and cooperative;Supportive family;Willingly participates in therapeutic activities   Barriers Decreased endurance;Fatigue;Generalized weakness;Impaired activity tolerance;Impaired balance         Assessment    Pt with good tolerance to OT treatment on this date focused on BLE strength. Pt utilizes sled with new addition of trialing BLE/LLE with prosthesis present. Pt with good tolerance to additional strengthing exercises. Endorses feeling fatigue in B quads.     Strengths: (P) Able to follow instructions, Alert and oriented, Effective communication skills, Independent prior level of function, Pleasant and cooperative, Supportive family, Willingly participates in  therapeutic activities  Barriers: (P) Decreased endurance, Fatigue, Generalized weakness, Impaired activity tolerance, Impaired balance    Plan    Kitchen mobility, meal prep tasks, BLE strengthening    Occupational Therapy Goals (Active)       Problem: Functional Transfers       Dates: Start:  08/24/23         Goal: STG-Within one week, patient will transfer to tub/shower with supervision using w/c and tub txfr bench.        Dates: Start:  08/24/23         Goal Note filed on 08/31/23 1105 by Aury Barbosa OT       Stand by                 Problem: IADL's       Dates: Start:  08/24/23         Goal: STG-Within one week, patient will access kitchen area at supv level using AE as needed.        Dates: Start:  08/24/23         Goal Note filed on 08/31/23 1105 by Aury Barbosa OT       Stand by                 Problem: OT Long Term Goals       Dates: Start:  08/21/23         Goal: LTG-By discharge, patient will complete basic self care tasks with mod I-SPV       Dates: Start:  08/21/23            Goal: LTG-By discharge, patient will perform bathroom transfers with mod I-SPV       Dates: Start:  08/21/23            Goal: LTG-By discharge, patient will complete basic home management with mod I-SPV from w/c level        Dates: Start:  08/21/23

## 2023-08-31 NOTE — DISCHARGE PLANNING
CM met with patient to discuss IDT and DC date of 9/5/23.  Answered questions.  CM will continue to monitor for DC needs.

## 2023-09-01 PROBLEM — R05.9 COUGH: Status: ACTIVE | Noted: 2023-01-01

## 2023-09-01 NOTE — PROGRESS NOTES
Received shift report from Patsy and assumed care of patient.  Patient awake, calm and stable, currently positioned in bed for comfort and safety; call light within reach.  Denies pain or discomfort at this time.  Will continue to monitor.

## 2023-09-01 NOTE — THERAPY
"Physical Therapy   Daily Treatment     Patient Name: Richard Hubbard II  Age:  48 y.o., Sex:  male  Medical Record #: [id number]  Today's Date: 9/1/2023     Precautions  Precautions: Fall Risk, Other (See Comments)  Comments: Low BP, prosthesis LLE    Subjective    Patient is found seated up in chair, agreeable to participate. \"It's your party\", willing to work on walking. \"I'm starting to feel more comfortable in this [prosthetic]\"     Objective       09/01/23 1031   PT Charge Group   PT Gait Training (Units) 4   PT Total Time Spent   PT Individual Total Time Spent (Mins) 60   Gait Functional Level of Assist    Gait Level Of Assist Contact Guard Assist   Assistive Device Front Wheel Walker   Distance (Feet) 400   # of Times Distance was Traveled 3   Deviation Antalgic;Bradykinetic   6 Minute Walk Test   Distance (feet) 450  (ambulated with new prosthetic over indoor surface)   Gait Speed (meters per second) 0.38   Number of Rests 0   Level of Assistance 5   Interdisciplinary Plan of Care Collaboration   IDT Collaboration with  Other (See Comments)   Patient Position at End of Therapy Seated   Collaboration Comments handoff to imaging tech for chest xray         Assessment    Six minute walk test performed over ground, patient has not been able to tolerate ambulating this far to this point. He ambulated 450 x 2 ft outdoors over uneven ground with seated rest break, then ambulated 450 ft indoors over well lit, flat, uncluttered walkway without rest break. We have not repeated the 6 minute roll test to this point. 450 ft is well below age matched norms, although this speed does place him in a functional category for d/c to home and limited community ambulator. He is making good functional progress, is expected to be safe to return to home by anticipated discharge date.    Strengths: Able to follow instructions, Independent prior level of function, Motivated for self care and independence, Pleasant and cooperative, " Supportive family, Willingly participates in therapeutic activities  Barriers: Decreased endurance, Fatigue, Generalized weakness, Hypotension, Impaired balance, Impaired activity tolerance, Limited mobility (ill fitting prosthesis, poly-joint RA)    Plan    Wc endurance, sit<>stand and progressive   Supine/ sidelying and prone mat program.  Posterior chain strength  Standing balance unsupported  Prosthetic gait training.     Passport items to be completed:  Get in/out of bed safely, in/out of a vehicle, safely use mobility device, walk or wheel around home/community, navigate up and down stairs, show how to get up/down from the ground, ensure home is accessible, demonstrate HEP, complete caregiver training    Physical Therapy Problems (Active)       Problem: Balance       Dates: Start:  08/31/23         Goal: STG-Within one week, patient will maintain dynamic standing during standing ball toss on firm ground with supervision assist x 5 minutes       Dates: Start:  08/31/23               Problem: Mobility       Dates: Start:  08/31/23         Goal: STG-Within one week, patient will perform 6 minute walk test with FWW and CGA       Dates: Start:  08/31/23               Problem: PT-Long Term Goals       Dates: Start:  08/31/23         Goal: LTG-By discharge, patient will perform home exercise program mat table program with set up assist and minimal cuing       Dates: Start:  08/31/23

## 2023-09-01 NOTE — CARE PLAN
The patient is Stable - Low risk of patient condition declining or worsening    Shift Goals  Clinical Goals: safety, pain mgmt, sleep  Patient Goals: pain control, sleep  Family Goals: Education    Progress made toward(s) clinical / shift goals:     Problem: Knowledge Deficit - Standard  Goal: Patient and family/care givers will demonstrate understanding of plan of care, disease process/condition, diagnostic tests and medications  Outcome: Progressing  Note: Pt education reinforced regarding plan of care with emphasis on adequate hydration, pt again shows better understanding with improved follow through, pt again reports understanding how this can effect constipation, will continue to reinforce education and continue to monitor.       Problem: Skin Integrity  Goal: Patient's skin integrity will be maintained or improve  Outcome: Progressing  Note: Patient's skin remains intact and free from new or accidental injury this shift.  Will continue to monitor.

## 2023-09-01 NOTE — PROGRESS NOTES
NURSING DAILY NOTE    Name: Richard Hubbard II   Date of Admission: 8/20/2023   Admitting Diagnosis: Acute diastolic heart failure (HCC)  Attending Physician: Shaista Guerra D.o.  Allergies: Nsaids, Diphenhydramine, Mirtazapine, Penicillins, and Promethazine    Safety  Patient Assist  SBA  Patient Precautions  Fall Risk, Other (See Comments)  Precaution Comments  Low BP, prosthesis LLE  Bed Transfer Status  Contact Guard Assist (bed>w/c)  Toilet Transfer Status   Contact Guard Assist  Assistive Devices  Rails, Wheelchair  Oxygen  None - Room Air  Diet/Therapeutic Dining  Current Diet Order   Procedures    Diet Order Diet: Regular (For 8/23/23 breakfast, pt is requesting victor, eggs and pancakes.  Plus with all meals, he would like 2 cokes on his trays.  Thank you.)     Pill Administration  whole  Agitated Behavioral Scale     ABS Level of Severity       Fall Risk  Has the patient had a fall this admission?   No  Osiris Healy Fall Risk Scoring  16, HIGH RISK  Fall Risk Safety Measures  bed alarm and pt does not have alarm on WC.     Vitals  Temperature: 36.4 °C (97.6 °F)  Temp src: Oral  Pulse: 85  Respiration: 18  Blood Pressure: 109/64  Blood Pressure MAP (Calculated): 79 MM HG  BP Location: Left, Upper Arm  Patient BP Position: Sitting     Oxygen  Pulse Oximetry: 98 %  O2 (LPM): 0  FiO2%: 21 %  O2 Delivery Device: None - Room Air    Bowel and Bladder  Last Bowel Movement  08/30/23  Stool Type  Type 5: Soft blob with clear cut edges (passed easily)  Bowel Device  Bathroom  Continent  Bladder: Continent void (Rivera catheter)   Bowel: Continent movement  Bladder Function  Urine Void (mL): 500 ml (urinal)  Number of Times Voided: 1  Urine Color: Yellow  Genitourinary Assessment   Bladder Assessment (WDL):  WDL Except  Rivera Catheter: Not Applicable  Rivera Reasons per MD Order: Acute urinary retention or bladder outlet obstruction  Irvera Care: Given  with Soap and Water  Urinary Elimination: Catheter (Document on LDA)  Urine Color: Yellow  Bladder Device: Bathroom, Urinal    Skin  Nam Score   18  Sensory Interventions   Skin Preventative Measures: Pillows in Use for Support / Positioning  Moisture Interventions  Moisturizers/Barriers: Barrier Wipes  Containment Devices: Indwelling Catheter      Pain  Pain Rating Scale  7 - Focus of attention, prevents doing daily activities  Pain Location  Shoulder, Knee  Pain Location Orientation  Right  Pain Interventions   Medication (see MAR) (pt states he wants only to have his scheduled morphine at bedtime.)    ADLs    Bathing   Patient Refused Bathing (notified RN Mary Beth)  Linen Change   Partial  Personal Hygiene  Change [place] Pads  Chlorhexidine Bath      Oral Care  Brushed Teeth  Teeth/Dentures  Broken Teeth (Comments)  Shave  Self  Nutrition Percentage Eaten  Between % Consumed  Environmental Precautions  Treaded Slipper Socks on Patient, Personal Belongings, Wastebasket, Call Bell etc. in Easy Reach, Transferred to Stronger Side, Bed in Low Position  Patient Turns/Positioning  Patient Turns Self from Side to Side  Patient Turns Assistance/Tolerance  Assistance of One  Bed Positions  Bed Controls On, Bed Locked  Head of Bed Elevated  Self regulated      Psychosocial/Neurologic Assessment  Psychosocial Assessment  Psychosocial (WDL):  Within Defined Limits  Patient Behaviors: Irritable  Neurologic Assessment  Neuro (WDL): Within Defined Limits  Level of Consciousness: Alert  Orientation Level: Oriented X4  Cognition: Follows commands  Speech: Clear  Pupil Assesment: No  R Pupil Size (mm): 2  R Pupil Shape / Description: Round  R Pupil Reaction: Brisk  L Pupil Size (mm): 2  L Pupil Shape / Description: Round  L Pupil Reaction: Brisk  Motor Function/Sensation Assessment: Sensation  RUE Sensation: Full sensation  LUE Sensation: Full sensation  RLE Sensation: Full sensation  LLE Sensation: Full sensation  EENT (WDL):   WDL Except    Cardio/Pulmonary Assessment  Edema      Respiratory Breath Sounds  RUL Breath Sounds: Diminished  RML Breath Sounds: Diminished  RLL Breath Sounds: Diminished  DONALD Breath Sounds: Clear  LLL Breath Sounds: Clear  Cardiac Assessment   Cardiac (WDL):  WDL Except (acute diastolyic heart failure, CHF)

## 2023-09-01 NOTE — PROGRESS NOTES
NURSING DAILY NOTE    Name: Richard Hubbard II   Date of Admission: 8/20/2023   Admitting Diagnosis: Acute diastolic heart failure (HCC)  Attending Physician: Shaista Guerra D.o.  Allergies: Nsaids, Diphenhydramine, Mirtazapine, Penicillins, and Promethazine    Safety  Patient Assist  SBA  Patient Precautions  Fall Risk  Precaution Comments  Low BP, prosthesis LLE  Bed Transfer Status  Supervised  Toilet Transfer Status   Contact Guard Assist  Assistive Devices  Rails, Wheelchair  Oxygen  None - Room Air  Diet/Therapeutic Dining  Current Diet Order   Procedures    Diet Order Diet: Regular (For 8/23/23 breakfast, pt is requesting victor, eggs and pancakes.  Plus with all meals, he would like 2 cokes on his trays.  Thank you.)     Pill Administration  whole  Agitated Behavioral Scale     ABS Level of Severity       Fall Risk  Has the patient had a fall this admission?   No  Osiris Healy Fall Risk Scoring  16, HIGH RISK  Fall Risk Safety Measures  bed alarm, chair alarm, and seatbelt alarm    Vitals  Temperature: 36.3 °C (97.4 °F)  Temp src: Oral  Pulse: 94  Respiration: 18  Blood Pressure: 107/74  Blood Pressure MAP (Calculated): 85 MM HG  BP Location: Left, Upper Arm  Patient BP Position: Supine     Oxygen  Pulse Oximetry: 100 %  O2 (LPM): 0  FiO2%: 21 %  O2 Delivery Device: None - Room Air    Bowel and Bladder  Last Bowel Movement  08/30/23  Stool Type  Type 5: Soft blob with clear cut edges (passed easily)  Bowel Device  Bathroom  Continent  Bladder: Continent void (Rivera catheter)   Bowel: Continent movement  Bladder Function  Urine Void (mL): 300 ml  Number of Times Voided: 1  Urine Color: Yellow  Genitourinary Assessment   Bladder Assessment (WDL):  WDL Except  Rivera Catheter: Not Applicable  Rivera Reasons per MD Order: Acute urinary retention or bladder outlet obstruction  Rivera Care: Given with Soap and Water  Urinary Elimination: Catheter  (Document on LDA)  Urine Color: Yellow  Bladder Device: Bathroom, Urinal    Skin  Nam Score   18  Sensory Interventions   Skin Preventative Measures: Pillows in Use for Support / Positioning  Moisture Interventions  Moisturizers/Barriers: Barrier Wipes  Containment Devices: Indwelling Catheter      Pain  Pain Rating Scale  7 - Focus of attention, prevents doing daily activities  Pain Location  Knee, Shoulder  Pain Location Orientation  Right  Pain Interventions   Medication (see MAR)    ADLs    Bathing   Patient Refused Bathing (notified RN Mary Beth)  Linen Change   Partial  Personal Hygiene  Change [place] Pads  Chlorhexidine Bath      Oral Care  Brushed Teeth  Teeth/Dentures  Broken Teeth (Comments)  Shave  Self  Nutrition Percentage Eaten  Between % Consumed  Environmental Precautions  Treaded Slipper Socks on Patient  Patient Turns/Positioning  Patient Turns Self from Side to Side  Patient Turns Assistance/Tolerance  Assistance of One  Bed Positions  Bed Controls On, Bed Locked  Head of Bed Elevated  Self regulated      Psychosocial/Neurologic Assessment  Psychosocial Assessment  Psychosocial (WDL):  Within Defined Limits  Patient Behaviors: Irritable  Neurologic Assessment  Neuro (WDL): Within Defined Limits  Level of Consciousness: Alert  Orientation Level: Oriented X4  Cognition: Follows commands  Speech: Clear  Pupil Assesment: No  R Pupil Size (mm): 2  R Pupil Shape / Description: Round  R Pupil Reaction: Brisk  L Pupil Size (mm): 2  L Pupil Shape / Description: Round  L Pupil Reaction: Brisk  Motor Function/Sensation Assessment: Sensation  RUE Sensation: Full sensation  LUE Sensation: Full sensation  RLE Sensation: Full sensation  LLE Sensation: Full sensation  EENT (WDL):  WDL Except    Cardio/Pulmonary Assessment  Edema      Respiratory Breath Sounds  RUL Breath Sounds: Diminished  RML Breath Sounds: Diminished  RLL Breath Sounds: Diminished  DONALD Breath Sounds: Clear  LLL Breath Sounds: Clear  Cardiac  Assessment   Cardiac (WDL):  WDL Except (acute dastolitic heart failure, CHF)

## 2023-09-01 NOTE — PROGRESS NOTES
"  Physical Medicine & Rehabilitation Progress Note    Encounter Date: 9/1/2023    Chief Complaint: Doing well    Interval Events (Subjective):  VSS    Seen and examined in his room. Has cough. COVID and CXR ordered. Feels like chest is tight when he has PNA or fluid from his leaky valve. No other issues now. Feels better after IV iron.     Objective:  VITAL SIGNS: /74   Pulse 94   Temp 36.3 °C (97.4 °F) (Oral)   Resp 18   Ht 1.803 m (5' 11\")   Wt 90.5 kg (199 lb 8.3 oz)   SpO2 100%   BMI 27.83 kg/m²     GEN: No apparent distress  HEENT: Head normocephalic, atraumatic.  Sclera nonicteric bilaterally, no ocular discharge appreciated bilaterally.  CV: Extremities warm and well-perfused, no peripheral edema appreciated bilaterally.  PULMONARY: Breathing nonlabored on room air, no respiratory accessory muscle use.  Not requiring supplemental oxygen.  SKIN: No appreciable skin breakdown on exposed areas of skin.  PSYCH: Mood and affect within normal limits.  NEURO: Awake alert.  Conversational.  Logical thought content.  MSK: L BKA    Laboratory Values:  Recent Results (from the past 72 hour(s))   CBC WITHOUT DIFFERENTIAL    Collection Time: 09/01/23  6:25 AM   Result Value Ref Range    WBC 5.5 4.8 - 10.8 K/uL    RBC 3.17 (L) 4.70 - 6.10 M/uL    Hemoglobin 8.7 (L) 14.0 - 18.0 g/dL    Hematocrit 28.4 (L) 42.0 - 52.0 %    MCV 89.6 81.4 - 97.8 fL    MCH 27.4 27.0 - 33.0 pg    MCHC 30.6 (L) 32.3 - 36.5 g/dL    RDW 51.4 (H) 35.9 - 50.0 fL    Platelet Count 202 164 - 446 K/uL    MPV 9.8 9.0 - 12.9 fL   Basic Metabolic Panel    Collection Time: 09/01/23  6:25 AM   Result Value Ref Range    Sodium 138 135 - 145 mmol/L    Potassium 4.4 3.6 - 5.5 mmol/L    Chloride 105 96 - 112 mmol/L    Co2 27 20 - 33 mmol/L    Glucose 89 65 - 99 mg/dL    Bun 15 8 - 22 mg/dL    Creatinine 0.83 0.50 - 1.40 mg/dL    Calcium 8.4 (L) 8.5 - 10.5 mg/dL    Anion Gap 6.0 (L) 7.0 - 16.0   ESTIMATED GFR    Collection Time: 09/01/23  6:25 AM "   Result Value Ref Range    GFR (CKD-EPI) 108 >60 mL/min/1.73 m 2       Medications:  Scheduled Medications   Medication Dose Frequency    potassium chloride SA  10 mEq DAILY    predniSONE  10 mg BID    omeprazole  20 mg BID    furosemide  20 mg Q DAY    pregabalin  150 mg TID    acetaminophen  1,000 mg TID    lidocaine  2 Patch Q24HR    vitamin D3  1,000 Units DAILY    senna-docusate  2 Tablet BID    dapagliflozin propanediol  10 mg DAILY    DULoxetine  60 mg DAILY    morphine  30 mg 4X/DAY    morphine ER  15 mg BID    melatonin  3 mg QHS     PRN medications: albuterol, zolpidem, Respiratory Therapy Consult, senna-docusate **AND** polyethylene glycol/lytes **AND** magnesium hydroxide **AND** bisacodyl, mag hydrox-al hydrox-simeth, sodium chloride, naloxone    Diet:  Current Diet Order   Procedures    Diet Order Diet: Regular (For 8/23/23 breakfast, pt is requesting victor, eggs and pancakes.  Plus with all meals, he would like 2 cokes on his trays.  Thank you.)       Medical Decision Making and Plan:      CHF exacerbation  - Originally presented with shortness of breath and pulmonary edema as well as cardiorenal syndrome  -While at [facility], patient was on IV Lasix for diuresis  - Cardiology was consulted at [facility] for patient's known aortic and mitral regurgitation, EF 55%  - Patient has been transferred on torsemide 40 mg daily, discussed new medication with [facility] hospitalist Dr. Wynne.  - Consult hospitalist  - Continue farxiga   -PT and OT for mobility and ADLs. Per guidelines, 15 hours per week between PT, OT and/or SLP.  -Follow-up with cardiology    Cough  COVID PENDING  CXR PENDING     Hypokalemia  - Transferred on potassium 10 mg daily. K + normal. Off of supplement. 8/29 BMP with normal K+.      Anxiety depression  - Continue Cymbalta      Anemia  - worse on 8/24/23, 9.7 from 11.3. Held heparin as history of GI bleeds. FOBT negative 8/25 x1, repeat PENDING. H/H stable in 9's. IV iron  8/28. CBC 8/29 - stable 9.4 Monitor. 9/1 reduced 8/7.      Insomnia  - Avoiding trazodone, patient had prolonged QTc from prior EKG  - Replace standing as needed trazodone for melatonin. Poor improvement with melatonin. Will trial Ambien as has tolerated in the past.      Rheumatoid arthritis  - Is on Humira q. 2 weeks  - Last dose 8/16--> due 8/30 (has to have home supply, not in pharmacy formulary)  - Has previously been on steroids, has not been transferred to [facility] rehab on steroids, may consider restarting if patient has an RA flare  - Restart Prednisone, shoulder pain limiting. Start prednisone 10mg BID 8/29. Pain improved 8/30 continue scheduled steroids. 8/31 leg pain better on steroids. Continue scheduled steroids.      History of left BKA  - Previously at rehab in March/April 2023  - Monitor for edema control and residual limb for proper fit with prosthesis, wife will be bringing left BKA prosthesis     Chronic pain  - Chronically followed by pain management - [place] [facility]  - Is on home dose morphine 30 mg 4 times a day as well as MS Contin 15 mg twice daily  - Do not anticipate patient will need increased as needed pain medications  - Naloxone as needed narcotics overdose. Start Lyrica 100 mg TID. 8/28 Increase Lyrica to 150mg TID. 8/30 continue Lyrica 150mg TID.      Vitamin D deficiency  21 on admission.  - Start 1000 U      Bladder   - Rivera Dc'd 8/25/2023; Voiding volitionally.     Skin - Patient at risk for skin breakdown due to debility in areas including sacrum, achilles, elbows and head in addition to other sites. Nursing to assess skin daily.      GI Ppx - Patient on Prilosec for GERD prophylaxis. Patient on Senna-docusate for constipation prophylaxis.       DVT PROPHYLAXIS: None - had been on Heparin, d/c 8/24 due to ambulating > 125 feet and having h/o GI bleed and bleeding day of 8/24    HOSPITALIST FOLLOWING: Yes    CODE STATUS: FULL CODE    DISPO: Home with family. Has  equipment. Family not available for  until Tuesday night.     JESSICA: 9/5/23 due to pharmacy closures for holiday and availability to get a ride.   ____________________________________    Dr. Shaista Guerra DO, MS  [facility] - Physical Medicine & Rehabilitation   ____________________________________

## 2023-09-01 NOTE — THERAPY
Occupational Therapy  Daily Treatment     Patient Name: Richard Hubbard II  Age:  48 y.o., Sex:  male  Medical Record #: [id number]  Today's Date: 9/1/2023     Precautions  Precautions: Fall Risk  Comments: Low BP, prosthesis LLE    Subjective    Pt pleasant and motivated to participate in OT     Objective     09/01/23 0931   OT Charge Group   Charges Yes   OT Therapy Activity (Units) 1   OT Therapeutic Exercise (Units) 3   OT Total Time Spent   OT Individual Total Time Spent (Mins) 60   Precautions   Precautions Fall Risk   Comments Low BP, prosthesis LLE   Vitals   O2 Delivery Device None - Room Air   Pain   Intervention Declines   Pain 0 - 10 Group   Location Generalized   Cognition    Level of Consciousness Alert   ABS (Agitated Behavior Scale)   Agitated Behavior Scale Performed No   Sleep/Wake Cycle   Sleep & Rest Awake;Out of bed   Vision Screen   Vision   (Pt wears glasses)   Functional Level of Assist   Lower Body Dressing Standby Assist   Lower Body Dressing Description   (Pt donned LLE prosthesis)   Sitting Upper Body Exercises   Sitting Upper Body Exercises Yes   Tricep Press Bilateral;Weight (See Comments for lbs)  (3 sets of 20; 45#)   Supine Lower Body Exercise   Supine Lower Body Exercises Yes   Other Exercises RLE shuttle w/ 6 bands, 3 sets fo 25; LLE shuttle w/ prosthesis and 4 bands, 3 sets of 15   Balance   Comments Provided CGA w/ FWW for functional ambulation outside, traversing concrete and ramped sidewalks; required one seated rest break.   Interdisciplinary Plan of Care Collaboration   Patient Position at End of Therapy Seated;Chair Alarm On;Call Light within Reach;Tray Table within Reach   Strengths & Barriers   Strengths Able to follow instructions;Alert and oriented;Effective communication skills;Independent prior level of function;Pleasant and cooperative;Supportive family;Willingly participates in therapeutic activities   Barriers Decreased endurance;Fatigue;Generalized  weakness;Impaired activity tolerance;Impaired balance     Assessment    Pt seen for tx, addressing UB/LB strengthening and functional ambulation. Pt tolerated activity well w/ rest breaks provided as needed. Pt progressing towards goals. Continue PT POC.    Strengths: Able to follow instructions, Alert and oriented, Effective communication skills, Independent prior level of function, Pleasant and cooperative, Supportive family, Willingly participates in therapeutic activities    Barriers: Decreased endurance, Fatigue, Generalized weakness, Impaired activity tolerance, Impaired balance    Plan    Kitchen Mobility  Meal prep  Overall strengthening    Passport items to be completed:  Perform bathroom transfers, complete dressing, complete feeding, get ready for the day, prepare a simple meal, participate in household tasks, adapt home for safety needs, demonstrate home exercise program, complete caregiver training     Occupational Therapy Goals (Active)       Problem: Functional Transfers       Dates: Start:  08/24/23         Goal: STG-Within one week, patient will transfer to tub/shower with supervision using w/c and tub txfr bench.        Dates: Start:  08/24/23         Goal Note filed on 08/31/23 1105 by Aury Barbosa OT       Stand by                 Problem: IADL's       Dates: Start:  08/24/23         Goal: STG-Within one week, patient will access kitchen area at supv level using AE as needed.        Dates: Start:  08/24/23         Goal Note filed on 08/31/23 1105 by Aury Barbosa OT       Stand by                 Problem: OT Long Term Goals       Dates: Start:  08/21/23         Goal: LTG-By discharge, patient will complete basic self care tasks with mod I-SPV       Dates: Start:  08/21/23            Goal: LTG-By discharge, patient will perform bathroom transfers with mod I-SPV       Dates: Start:  08/21/23            Goal: LTG-By discharge, patient will complete basic home management with mod I-SPV from w/c  level        Dates: Start:  08/21/23

## 2023-09-01 NOTE — PROGRESS NOTES
[facility] Medicine Daily Progress Note        Chief Complaint:  Volume overload    Interval History:  Pt c/o congestion in right chest and cough.  Labs reviewed.    Review of Systems  Review of Systems   Constitutional:  Negative for chills and fever.   HENT:  Positive for congestion.    Eyes: Negative.    Respiratory:  Positive for cough. Negative for shortness of breath.    Cardiovascular:  Positive for leg swelling. Negative for chest pain and palpitations.   Gastrointestinal:  Negative for abdominal pain, nausea and vomiting.   Genitourinary: Negative.    Musculoskeletal: Negative.    Skin:  Negative for itching and rash.   Endo/Heme/Allergies:  Negative for polydipsia. Does not bruise/bleed easily.        Physical Exam  Temp:  [36.3 °C (97.4 °F)-36.4 °C (97.6 °F)] 36.3 °C (97.4 °F)  Pulse:  [85-94] 94  Resp:  [14-18] 18  BP: (107-114)/(64-74) 107/74  SpO2:  [96 %-100 %] 100 %    Physical Exam  Vitals reviewed.   Constitutional:       General: He is not in acute distress.     Appearance: Normal appearance. He is not ill-appearing.   HENT:      Head: Normocephalic and atraumatic.      Right Ear: External ear normal.      Left Ear: External ear normal.      Nose: Nose normal.      Mouth/Throat:      Pharynx: Oropharynx is clear.   Eyes:      General:         Right eye: No discharge.         Left eye: No discharge.      Extraocular Movements: Extraocular movements intact.      Conjunctiva/sclera: Conjunctivae normal.   Cardiovascular:      Rate and Rhythm: Normal rate and regular rhythm.   Pulmonary:      Effort: No respiratory distress.      Breath sounds: No wheezing.      Comments: Faint crackles  Abdominal:      General: Bowel sounds are normal. There is no distension.      Palpations: Abdomen is soft.      Tenderness: There is no abdominal tenderness.   Musculoskeletal:      Cervical back: Normal range of motion and neck supple.      Right lower leg: Edema present.      Comments: L BKA   Skin:     General: Skin  is warm and dry.   Neurological:      Mental Status: He is alert and oriented to person, place, and time.         Fluids    Intake/Output Summary (Last 24 hours) at 9/1/2023 1201  Last data filed at 9/1/2023 0500  Gross per 24 hour   Intake --   Output 2600 ml   Net -2600 ml       Laboratory  Recent Labs     09/01/23  0625   WBC 5.5   RBC 3.17*   HEMOGLOBIN 8.7*   HEMATOCRIT 28.4*   MCV 89.6   MCH 27.4   MCHC 30.6*   RDW 51.4*   PLATELETCT 202   MPV 9.8     Recent Labs     09/01/23  0625   SODIUM 138   POTASSIUM 4.4   CHLORIDE 105   CO2 27   GLUCOSE 89   BUN 15   CREATININE 0.83   CALCIUM 8.4*                 Assessment/Plan  Cough  Assessment & Plan  R/O COVID/FLU/RSV  Check CXR    Cardiac volume overload  Assessment & Plan  Echo EF 55%, RVSP 50 mmHg, mod MR/AS/AR  S/P IV Lasix then changed to PO Torsemide prior to Rehab admission  Torsemide discontinued last week for hypotension  Also on Farxiga  CXR now shows worsening pulm edema  Resumed diuretics w/ low dose Lasix  Needs outpt Cardiology F/U    Anemia  Assessment & Plan  Fe 32, S/P IV Fe  FOB negative x 2 thus far  Follow H/H    History of gastric bypass  Assessment & Plan  Monitor electrolytes    S/P BKA (below knee amputation) unilateral, left (HCC)- (present on admission)  Assessment & Plan  Ambulates with prosthesis    Vitamin D deficiency- (present on admission)  Assessment & Plan  Vit D level 21  On supplementation    Chronic pain syndrome- (present on admission)  Assessment & Plan  On Morphine  Management per Physiatry    History of atrial fibrillation ( single episode 2019)- (present on admission)  Assessment & Plan  S/P cardioversion in 2019  On no anti-plts or AC due to h/o GIB    Rheumatoid arthritis (HCC)- (present on admission)  Assessment & Plan  Presently on Prednisone  Also chronically on Humira  Rheumatology F/U    History of bleeding ulcers- (present on admission)  Assessment & Plan  Continue PPI bid       Full Code    Discussed w/ pt and   Charlie

## 2023-09-01 NOTE — THERAPY
Physical Therapy   Daily Treatment     Patient Name: Richard Hubbard II  Age:  48 y.o., Sex:  male  Medical Record #: [id number]  Today's Date: 9/1/2023     Precautions  Precautions: Fall Risk  Comments: Low BP, prosthesis LLE    Subjective    Pt received in room, agreeable to PT session.     Objective       09/01/23 1331   PT Charge Group   PT Therapeutic Exercise (Units) 4   PT Total Time Spent   PT Individual Total Time Spent (Mins) 60   Precautions   Precautions Fall Risk   Comments Low BP, prosthesis LLE   Cognition    Level of Consciousness Alert   Sleep/Wake Cycle   Sleep & Rest Awake;Out of bed   Wheelchair Functional Level of Assist   Wheelchair Assist Modified Independent   Distance Wheelchair (Feet or Distance) 846   Transfer Functional Level of Assist   Bed, Chair, Wheelchair Transfer Supervised   Bed Chair Wheelchair Transfer Description Increased time   Supine Lower Body Exercise   Comments supine pec stretch with foam roller on mat table, L sidelying open book x10 reps   Sitting Lower Body Exercises   Sit to Stand 1 set of 15  (from EOM, focus on controlled eccentric sit and hip hinge, UE support to facilitate improved control)   Bed Mobility    Supine to Sit Modified Independent   Sit to Supine Modified Independent   Sit to Stand Modified Independent   Scooting Modified Independent   Rolling Modified Independent   Interdisciplinary Plan of Care Collaboration   IDT Collaboration with  Physical Therapist   Patient Position at End of Therapy Seated;Call Light within Reach;Tray Table within Reach;Phone within Reach   Collaboration Comments primary PT re: CLOF     Discussed strategies to adhere to an exercise program when discharged home.    6 minute wheel test: 846'. Reviewed SC propulsion technique and its relationship to shoulder health following assessment.     Pt dons/doffs L prosthesis independently.    Assessment    Pt fully participative with session. Will benefit from posterior chain strength  and B quad eccentric strengthening to facilitate improved stability during functional mobility.     Strengths: Able to follow instructions, Independent prior level of function, Motivated for self care and independence, Pleasant and cooperative, Supportive family, Willingly participates in therapeutic activities  Barriers: Decreased endurance, Fatigue, Generalized weakness, Hypotension, Impaired balance, Impaired activity tolerance, Limited mobility (ill fitting prosthesis, poly-joint RA)    Plan    Wc endurance, sit<>stand and progressive   Supine/ sidelying and prone mat program.  Posterior chain strength  Standing balance unsupported  Prosthetic gait training.         Physical Therapy Problems (Active)       Problem: Balance       Dates: Start:  08/31/23         Goal: STG-Within one week, patient will maintain dynamic standing during standing ball toss on firm ground with supervision assist x 5 minutes       Dates: Start:  08/31/23               Problem: Mobility       Dates: Start:  08/31/23         Goal: STG-Within one week, patient will perform 6 minute walk test with FWW and CGA       Dates: Start:  08/31/23               Problem: PT-Long Term Goals       Dates: Start:  08/31/23         Goal: LTG-By discharge, patient will perform home exercise program mat table program with set up assist and minimal cuing       Dates: Start:  08/31/23

## 2023-09-01 NOTE — CARE PLAN
The patient is Stable - Low risk of patient condition declining or worsening    Shift Goals  Clinical Goals: safety, pain mgmt, sleep  Patient Goals: pain control, sleep  Family Goals: Education    Progress made toward(s) clinical / shift goals:    Problem: Pain - Standard  Goal: Alleviation of pain or a reduction in pain to the patient’s comfort goal  Outcome: Progressing     Problem: Fall Risk - Rehab  Goal: Patient will remain free from falls  Outcome: Progressing

## 2023-09-02 NOTE — PROGRESS NOTES
NURSING DAILY NOTE    Name: Richard Hubbard II   Date of Admission: 8/20/2023   Admitting Diagnosis: Acute diastolic heart failure (HCC)  Attending Physician: Shaista Guerra D.o.  Allergies: Nsaids, Diphenhydramine, Mirtazapine, Penicillins, and Promethazine    Safety  Patient Assist  SBA  Patient Precautions  Fall Risk  Precaution Comments  Low BP, prosthesis LLE  Bed Transfer Status  Supervised  Toilet Transfer Status   Contact Guard Assist  Assistive Devices  Rails, Wheelchair  Oxygen  None - Room Air  Diet/Therapeutic Dining  Current Diet Order   Procedures    Diet Order Diet: Regular (For 8/23/23 breakfast, pt is requesting victor, eggs and pancakes.  Plus with all meals, he would like 2 cokes on his trays.  Thank you.)     Pill Administration  whole  Agitated Behavioral Scale     ABS Level of Severity       Fall Risk  Has the patient had a fall this admission?   No  Osiris Healy Fall Risk Scoring  16, HIGH RISK  Fall Risk Safety Measures  bed alarm, chair alarm, seatbelt alarm, poor balance, and low vision/ hearing    Vitals  Temperature: 36.9 °C (98.5 °F)  Temp src: Oral  Pulse: 84  Respiration: 18  Blood Pressure: 98/61  Blood Pressure MAP (Calculated): 73 MM HG  BP Location: Left, Upper Arm  Patient BP Position: Supine     Oxygen  Pulse Oximetry: 98 %  O2 (LPM): 0  FiO2%: 21 %  O2 Delivery Device: None - Room Air    Bowel and Bladder  Last Bowel Movement  08/30/23  Stool Type  Type 5: Soft blob with clear cut edges (passed easily)  Bowel Device  Bathroom  Continent  Bladder: Continent void (Rivera catheter)   Bowel: Continent movement  Bladder Function  Urine Void (mL): 1000 ml  Number of Times Voided: 1  Urine Color: Yellow  Genitourinary Assessment   Bladder Assessment (WDL):  WDL Except  Rivera Catheter: Not Applicable  Rivera Reasons per MD Order: Acute urinary retention or bladder outlet obstruction  Rivera Care: Given with Soap and  Water  Urinary Elimination: Catheter (Document on LDA)  Urine Color: Yellow  Bladder Device: Urinal    Skin  Nam Score   18  Sensory Interventions   Skin Preventative Measures: Pillows in Use for Support / Positioning  Moisture Interventions  Moisturizers/Barriers: Barrier Wipes  Containment Devices: Indwelling Catheter      Pain  Pain Rating Scale  8 - Awful, hard to do anything  Pain Location  Knee, Shoulder  Pain Location Orientation  Right  Pain Interventions   Medication (see MAR)    ADLs    Bathing   Patient Refused Bathing (notified RN Mary Beth)  Linen Change   Partial  Personal Hygiene  Change Charlotte Pads  Chlorhexidine Bath      Oral Care  Brushed Teeth  Teeth/Dentures  Broken Teeth (Comments)  Shave  Self  Nutrition Percentage Eaten  Between % Consumed  Environmental Precautions  Treaded Slipper Socks on Patient, Transferred to Stronger Side, Bed in Low Position, Personal Belongings, Wastebasket, Call Bell etc. in Easy Reach  Patient Turns/Positioning  Patient Turns Self from Side to Side  Patient Turns Assistance/Tolerance  Assistance of One  Bed Positions  Bed Controls On  Head of Bed Elevated  Self regulated      Psychosocial/Neurologic Assessment  Psychosocial Assessment  Psychosocial (WDL):  Within Defined Limits  Patient Behaviors: Drowsy  Neurologic Assessment  Neuro (WDL): Within Defined Limits  Level of Consciousness: Alert  Orientation Level: Oriented X4  Cognition: Follows commands  Speech: Clear  Pupil Assesment: No  R Pupil Size (mm): 2  R Pupil Shape / Description: Round  R Pupil Reaction: Brisk  L Pupil Size (mm): 2  L Pupil Shape / Description: Round  L Pupil Reaction: Brisk  Motor Function/Sensation Assessment: Sensation  RUE Sensation: Full sensation  LUE Sensation: Full sensation  RLE Sensation: Full sensation  LLE Sensation: Full sensation  EENT (WDL):  WDL Except    Cardio/Pulmonary Assessment  Edema      Respiratory Breath Sounds  RUL Breath Sounds: Diminished  RML Breath Sounds:  Diminished  RLL Breath Sounds: Diminished  DONALD Breath Sounds: Clear  LLL Breath Sounds: Clear  Cardiac Assessment   Cardiac (WDL):  WDL Except (acute dastolitic heart failure, CHF)

## 2023-09-02 NOTE — CARE PLAN
"The patient is Stable - Low risk of patient condition declining or worsening    Shift Goals  Clinical Goals: pain mgmt, safety  Patient Goals: pain, safety  Family Goals: Education    Progress made toward(s) clinical / shift goals:      Problem: Pain - Standard  Goal: Alleviation of pain or a reduction in pain to the patient’s comfort goal  Outcome: Progressing   --pt receiving extended release as well as immediate release pain meds -chronic pain level is a \"7\"  Problem: Fall Risk - Rehab  Goal: Patient will remain free from falls  Outcome: Progressing   --pt remains free from falls  Problem: Skin Integrity  Goal: Patient's skin integrity will be maintained or improve  Outcome: Progressing   --buttock red -barrier cream applied    Patient is not progressing towards the following goals:none      "

## 2023-09-02 NOTE — PROGRESS NOTES
Hospital Medicine Daily Progress Note        Chief Complaint:  Volume overload    Interval History:  Pt reports cough and chest congestion better today.  Last 24 hours I/O -3360 mL.  Labs reviewed; CXR not done as ordered.    Review of Systems  Review of Systems   Constitutional:  Negative for chills and fever.   HENT:  Negative for congestion.    Eyes: Negative.    Respiratory:  Negative for cough and shortness of breath.    Cardiovascular:  Positive for leg swelling. Negative for chest pain and palpitations.   Gastrointestinal:  Negative for abdominal pain, nausea and vomiting.   Genitourinary: Negative.    Musculoskeletal: Negative.    Skin:  Negative for itching and rash.   Endo/Heme/Allergies:  Negative for polydipsia. Does not bruise/bleed easily.        Physical Exam  Temp:  [36.7 °C (98.1 °F)-36.9 °C (98.5 °F)] 36.7 °C (98.1 °F)  Pulse:  [84-96] 96  Resp:  [16-18] 16  BP: (98-99)/(61-64) 99/64  SpO2:  [95 %-98 %] 95 %    Physical Exam  Vitals reviewed.   Constitutional:       General: He is not in acute distress.     Appearance: Normal appearance. He is not ill-appearing.   HENT:      Head: Normocephalic and atraumatic.      Right Ear: External ear normal.      Left Ear: External ear normal.      Nose: Nose normal.      Mouth/Throat:      Pharynx: Oropharynx is clear.   Eyes:      General:         Right eye: No discharge.         Left eye: No discharge.      Extraocular Movements: Extraocular movements intact.      Conjunctiva/sclera: Conjunctivae normal.   Cardiovascular:      Rate and Rhythm: Normal rate and regular rhythm.   Pulmonary:      Effort: No respiratory distress.      Breath sounds: No wheezing.      Comments: Faint crackles  Abdominal:      General: Bowel sounds are normal. There is no distension.      Palpations: Abdomen is soft.      Tenderness: There is no abdominal tenderness.   Musculoskeletal:      Cervical back: Normal range of motion and neck supple.      Right lower leg: Edema present.       Comments: L BKA   Skin:     General: Skin is warm and dry.   Neurological:      Mental Status: He is alert and oriented to person, place, and time.         Fluids    Intake/Output Summary (Last 24 hours) at 9/2/2023 1222  Last data filed at 9/2/2023 1049  Gross per 24 hour   Intake 520 ml   Output 5160 ml   Net -4640 ml       Laboratory  Recent Labs     09/01/23  0625   WBC 5.5   RBC 3.17*   HEMOGLOBIN 8.7*   HEMATOCRIT 28.4*   MCV 89.6   MCH 27.4   MCHC 30.6*   RDW 51.4*   PLATELETCT 202   MPV 9.8     Recent Labs     09/01/23  0625   SODIUM 138   POTASSIUM 4.4   CHLORIDE 105   CO2 27   GLUCOSE 89   BUN 15   CREATININE 0.83   CALCIUM 8.4*                 Assessment/Plan  Cough  Assessment & Plan  COVID/FLU/RSV negative  CXR unclear why not done yet    Cardiac volume overload  Assessment & Plan  Echo EF 55%, RVSP 50 mmHg, mod MR/AS/AR  S/P IV Lasix then changed to PO Torsemide prior to Rehab admission  Torsemide discontinued for hypotension  Also on Farxiga  CXR 8/29/23 shows worsening pulm edema  Resumed low dose Lasix and diuresing well  Needs outpt Cardiology F/U    Anemia  Assessment & Plan  Fe 32, S/P IV Fe  FOB negative x 2 thus far  Follow H/H    History of gastric bypass  Assessment & Plan  Monitor electrolytes    S/P BKA (below knee amputation) unilateral, left (HCC)- (present on admission)  Assessment & Plan  Has prosthesis    Vitamin D deficiency- (present on admission)  Assessment & Plan  Vit D level 21  On supplementation    Chronic pain syndrome- (present on admission)  Assessment & Plan  On Morphine  Management per Physiatry    History of atrial fibrillation ( single episode 2019)- (present on admission)  Assessment & Plan  S/P cardioversion in 2019  On no anti-plts or AC due to h/o GIB    Rheumatoid arthritis (HCC)- (present on admission)  Assessment & Plan  Presently on Prednisone  Also chronically on Humira  Rheumatology F/U    History of bleeding ulcers- (present on admission)  Assessment &  Plan  Continue PPI bid       Full Code    Discussed w/ pt and Charge RN (Lori)

## 2023-09-02 NOTE — CARE PLAN
The patient is Stable - Low risk of patient condition declining or worsening    Shift Goals  Clinical Goals: pain mgmt, safety, sleep  Patient Goals: pain control, sleep, go back home  Family Goals: Education    Progress made toward(s) clinical / shift goals:     Problem: Skin Integrity  Goal: Patient's skin integrity will be maintained or improve  Outcome: Progressing  Note: Pt's skin remains free from new or accidental injury. Skin protective measures in place.       Problem: Self Care  Goal: Patient will have the ability to perform ADLs independently or with assistance  Outcome: Progressing  Note: Pt abl to transfer self via slide board and/ or stand and pivot from bed to WC and back again. Will continue to monitor for safety

## 2023-09-02 NOTE — PROGRESS NOTES
Received shift report and assumed care of patient.  Patient awake, calm and stable, currently positioned in WC inside Family lounge for comfort and safety; call light within reach.  C/O pain 8/10 at this time Pt states this is normal for him and he will want his scheduled morphine and Ambien with NOC med pass .  Will continue to monitor.

## 2023-09-03 NOTE — CARE PLAN
The patient is Stable - Low risk of patient condition declining or worsening    Shift Goals  Clinical Goals: pain mgmt, safety  Patient Goals: sleep, family time  Family Goals: Education    Progress made toward(s) clinical / shift goals:    Problem: Psychosocial  Goal: Patient's ability to verbalize feelings about condition will improve  Outcome: Progressing  Note: At hand-off pt was reported to be tearful earlier on 9/2/2023.   I spent about an hour with pt. Discussed being lonely (since family is sick and unable to visit from [place]), he's worried about his mother being sick and not being able to get the life saving surgery she needs. Pt struggling with overwhelm of his responsibilities and difficulties being able to balance family and his own health appointments scheduled in [place]. Pt vocalized no desire/plans for harm to self or others.

## 2023-09-03 NOTE — THERAPY
"Occupational Therapy  Daily Treatment     Patient Name: Richard Hubbard II  Age:  48 y.o., Sex:  male  Medical Record #: [id number]  Today's Date: 9/3/2023     Precautions  Precautions: (P) Fall Risk  Comments: (P) Low BP, prosthesis LLE         Subjective  \"I'd like to work on some exercises to get stronger\"  Pt was alert, oriented and cooperative w/ tx.     Objective       09/03/23 1031   Precautions   Precautions Fall Risk   Comments Low BP, prosthesis LLE   Non Verbal Descriptors   Non Verbal Scale  Calm   Cognition    Cognition / Consciousness WDL   Level of Consciousness Alert   Sitting Upper Body Exercises   Chest Press 3 sets of 10  (Vertical Chest Press @ Equalizer, 3x10 @ 30#'s)   Bilateral Row 3 sets of 10  (Bilateral Row at Equalizer, 3x10 @ 25#'s)   Tricep Press Other Resistance (See Comments)  (Rickshaw, Forward facing - 1x10@50#'s, 1x10@60#'s, 1x10@65#'s; Reverse facing, 3x10 @ 45#'s)   Standing Upper Body Exercises   Other Exercises Standing at [facility], [facility], Level 1, 10 mins, .681 km, rest break x 1.         Assessment    OT tx emphasis on BUE TherEx seated and in standing - see notes above.    Strengths: Able to follow instructions, Alert and oriented, Effective communication skills, Independent prior level of function, Pleasant and cooperative, Supportive family, Willingly participates in therapeutic activities  Barriers: Decreased endurance, Fatigue, Generalized weakness, Impaired activity tolerance, Impaired balance    Plan    Refer to primary OT for POC    Passport items to be completed:  Perform bathroom transfers, complete dressing, complete feeding, get ready for the day, prepare a simple meal, participate in household tasks, adapt home for safety needs, demonstrate home exercise program, complete caregiver training     Occupational Therapy Goals (Active)       Problem: Functional Transfers       Dates: Start:  08/24/23         Goal: STG-Within one week, patient will transfer to " tub/shower with supervision using w/c and tub txfr bench.        Dates: Start:  08/24/23         Goal Note filed on 08/31/23 1105 by Aury Barbosa OT       Stand by                 Problem: IADL's       Dates: Start:  08/24/23         Goal: STG-Within one week, patient will access kitchen area at supv level using AE as needed.        Dates: Start:  08/24/23         Goal Note filed on 08/31/23 1105 by Aury Barbosa OT       Stand by                 Problem: OT Long Term Goals       Dates: Start:  08/21/23         Goal: LTG-By discharge, patient will complete basic self care tasks with mod I-SPV       Dates: Start:  08/21/23            Goal: LTG-By discharge, patient will perform bathroom transfers with mod I-SPV       Dates: Start:  08/21/23            Goal: LTG-By discharge, patient will complete basic home management with mod I-SPV from w/c level        Dates: Start:  08/21/23

## 2023-09-03 NOTE — PROGRESS NOTES
Received shift report and assumed care of patient.  Patient awake, calm and stable, currently positioned in  in family lounge for comfort and safety.  Denies having any different or new pain or discomfort at this time.  Will continue to monitor.

## 2023-09-03 NOTE — THERAPY
Physical Therapy   Daily Treatment     Patient Name: Richard Hubbard II  Age:  48 y.o., Sex:  male  Medical Record #: [id number]  Today's Date: 9/3/2023     Precautions  Precautions: Fall Risk  Comments: Low BP, prosthesis LLE    Subjective    Patient requires right shoe adjustment and shoe change prior to ambulation; pleased with ambulation progress, reports he is planning right foot surgery, then anticipating another rehab stay      Objective       09/03/23 1431   PT Charge Group   PT Gait Training (Units) 3   PT Therapeutic Activities (Units) 1   PT Total Time Spent   PT Individual Total Time Spent (Mins) 60   Precautions   Precautions Fall Risk   Comments Low BP, prosthesis LLE   Gait Functional Level of Assist    Gait Level Of Assist Standby Assist   Assistive Device Front Wheel Walker   Distance (Feet) 400   # of Times Distance was Traveled 3   Deviation Antalgic;Bradykinetic  (occasional left LE scissoring steps, can self-correct with verbal cues)   Interdisciplinary Plan of Care Collaboration   IDT Collaboration with  Occupational Therapist   Collaboration Comments treatment planning       Assessment    Able to ambulate 400ft bous with FWW with occasional right foot scissoring (history of right charcot foot deformity; PT added foot insert); patient aware and able to correct with verbal cues; scissoring becomes more frequent with fatigue and turning corners    Strengths: Able to follow instructions, Independent prior level of function, Motivated for self care and independence, Pleasant and cooperative, Supportive family, Willingly participates in therapeutic activities  Barriers: Decreased endurance, Fatigue, Generalized weakness, Hypotension, Impaired balance, Impaired activity tolerance, Limited mobility (ill fitting prosthesis, poly-joint RA)    Plan    Wc endurance, sit<>stand and progressive   Supine/ sidelying and prone mat program.  Posterior chain strength  Standing balance unsupported  Prosthetic  gait training.   Outdoor ambulation with FWW    Passport items to be completed:  Get in/out of bed safely, in/out of a vehicle, safely use mobility device, walk or wheel around home/community, navigate up and down stairs, show how to get up/down from the ground, ensure home is accessible, demonstrate HEP, complete caregiver training    Physical Therapy Problems (Active)       Problem: Balance       Dates: Start:  08/31/23         Goal: STG-Within one week, patient will maintain dynamic standing during standing ball toss on firm ground with supervision assist x 5 minutes       Dates: Start:  08/31/23               Problem: Mobility       Dates: Start:  08/31/23         Goal: STG-Within one week, patient will perform 6 minute walk test with FWW and CGA       Dates: Start:  08/31/23               Problem: PT-Long Term Goals       Dates: Start:  08/31/23         Goal: LTG-By discharge, patient will perform home exercise program mat table program with set up assist and minimal cuing       Dates: Start:  08/31/23

## 2023-09-03 NOTE — CARE PLAN
The patient is Stable - Low risk of patient condition declining or worsening    Problem: Knowledge Deficit - Standard  Goal: Patient and family/care givers will demonstrate understanding of plan of care, disease process/condition, diagnostic tests and medications  Outcome: Progressing. Reviewed POC, all questions answered.        Problem: Pain - Standard  Goal: Alleviation of pain or a reduction in pain to the patient’s comfort goal  Outcome: Progressing. Medicated per MAR and repositioned for comfort.       Shift Goals  Clinical Goals: [place]  Patient Goals: Pain control, participate in therapy  Family Goals: Education

## 2023-09-03 NOTE — PROGRESS NOTES
Hospital Medicine Daily Progress Note        Chief Complaint:  Volume overload    Interval History:  Pt denies cough but c/o a little congestion today.  Last 24 hrs I/O -2120 mL.  CXR results reviewed and discussed.    Review of Systems  Review of Systems   Constitutional:  Negative for chills and fever.   HENT:  Positive for congestion.    Eyes: Negative.    Respiratory:  Negative for cough and shortness of breath.    Cardiovascular:  Positive for leg swelling. Negative for chest pain and palpitations.   Gastrointestinal:  Negative for abdominal pain, nausea and vomiting.   Genitourinary: Negative.    Musculoskeletal: Negative.    Skin:  Negative for itching and rash.   Endo/Heme/Allergies:  Negative for polydipsia. Does not bruise/bleed easily.        Physical Exam  Temp:  [36.3 °C (97.4 °F)-36.6 °C (97.8 °F)] 36.3 °C (97.4 °F)  Pulse:  [86-94] 86  Resp:  [16-20] 17  BP: (104-115)/(63-68) 104/63  SpO2:  [93 %-95 %] 95 %    Physical Exam  Vitals reviewed.   Constitutional:       General: He is not in acute distress.     Appearance: Normal appearance. He is not ill-appearing.   HENT:      Head: Normocephalic and atraumatic.      Right Ear: External ear normal.      Left Ear: External ear normal.      Nose: Nose normal.      Mouth/Throat:      Pharynx: Oropharynx is clear.   Eyes:      General:         Right eye: No discharge.         Left eye: No discharge.      Extraocular Movements: Extraocular movements intact.      Conjunctiva/sclera: Conjunctivae normal.   Cardiovascular:      Rate and Rhythm: Normal rate and regular rhythm.   Pulmonary:      Effort: No respiratory distress.      Breath sounds: No wheezing.      Comments: Faint crackles  Abdominal:      General: Bowel sounds are normal. There is no distension.      Palpations: Abdomen is soft.      Tenderness: There is no abdominal tenderness.   Musculoskeletal:      Cervical back: Normal range of motion and neck supple.      Right lower leg: Edema present.       Comments: L BKA   Skin:     General: Skin is warm and dry.   Neurological:      Mental Status: He is alert and oriented to person, place, and time.         Fluids    Intake/Output Summary (Last 24 hours) at 9/3/2023 0959  Last data filed at 9/3/2023 0907  Gross per 24 hour   Intake 1000 ml   Output 1800 ml   Net -800 ml       Laboratory  Recent Labs     09/01/23  0625   WBC 5.5   RBC 3.17*   HEMOGLOBIN 8.7*   HEMATOCRIT 28.4*   MCV 89.6   MCH 27.4   MCHC 30.6*   RDW 51.4*   PLATELETCT 202   MPV 9.8     Recent Labs     09/01/23  0625   SODIUM 138   POTASSIUM 4.4   CHLORIDE 105   CO2 27   GLUCOSE 89   BUN 15   CREATININE 0.83   CALCIUM 8.4*                 Assessment/Plan  Cardiac volume overload  Assessment & Plan  Echo EF 55%, RVSP 50 mmHg, mod MR/AS/AR  S/P IV Lasix then changed to PO Torsemide prior to Rehab admission  Torsemide discontinued for hypotension  Also on Farxiga  CXR 8/29/23 worsening pulm edema  Resumed low dose Lasix and diuresing well (I/O negative 2 to 3 L every 24 hours)  F/U CXR 9/1/23 persistent pulm edema, atx  Continue diuresis as tolerated by blood pressure and renal function  Check F/U BNP in AM  Start IS  Needs outpt Cardiology F/U    Anemia  Assessment & Plan  Fe 32, S/P IV Fe  FOB negative x 2 thus far  Follow H/H  Check F/U labs in AM    History of gastric bypass  Assessment & Plan  Monitor electrolytes  Check F/U labs in AM    S/P BKA (below knee amputation) unilateral, left (HCC)- (present on admission)  Assessment & Plan  Has prosthesis    Vitamin D deficiency- (present on admission)  Assessment & Plan  Vit D level 21  On supplementation    Chronic pain syndrome- (present on admission)  Assessment & Plan  On Morphine  Management per Physiatry    History of atrial fibrillation ( single episode 2019)- (present on admission)  Assessment & Plan  S/P cardioversion in 2019  On no anti-plts or AC due to h/o GIB    Rheumatoid arthritis (HCC)- (present on admission)  Assessment &  Plan  Presently on Prednisone  Also chronically on Humira  Rheumatology F/U    History of bleeding ulcers- (present on admission)  Assessment & Plan  Continue PPI bid       Full Code    Discussed w/ pt

## 2023-09-03 NOTE — PROGRESS NOTES
NURSING DAILY NOTE    Name: Richard Hubbard II   Date of Admission: 8/20/2023   Admitting Diagnosis: Acute diastolic heart failure (HCC)  Attending Physician: Shaista Guerra D.o.  Allergies: Nsaids, Diphenhydramine, Mirtazapine, Penicillins, and Promethazine    Safety  Patient Assist  sb a  Patient Precautions  Fall Risk  Precaution Comments  Low BP, prosthesis LLE  Bed Transfer Status  Supervised  Toilet Transfer Status   Contact Guard Assist  Assistive Devices  Wheelchair  Oxygen  None - Room Air  Diet/Therapeutic Dining  Current Diet Order   Procedures    Diet Order Diet: Regular (For 8/23/23 breakfast, pt is requesting victor, eggs and pancakes.  Plus with all meals, he would like 2 cokes on his trays.  Thank you.)     Pill Administration  whole  Agitated Behavioral Scale     ABS Level of Severity       Fall Risk  Has the patient had a fall this admission?   No  Osiris Healy Fall Risk Scoring  18, HIGH RISK  Fall Risk Safety Measures  ok to leave pt in bathroom and ok to leave pt in shower    Vitals  Temperature: 36.6 °C (97.8 °F)  Temp src: Oral  Pulse: 94  Respiration: 16  Blood Pressure: 115/65  Blood Pressure MAP (Calculated): 82 MM HG  BP Location: Right, Upper Arm  Patient BP Position: Sitting     Oxygen  Pulse Oximetry: 93 %  O2 (LPM): 0  FiO2%: 21 %  O2 Delivery Device: None - Room Air    Bowel and Bladder  Last Bowel Movement  08/30/23  Stool Type  Type 5: Soft blob with clear cut edges (passed easily)  Bowel Device  Bathroom  Continent  Bladder: Continent void (Rivera catheter)   Bowel: Continent movement  Bladder Function  Urine Void (mL): 300 ml  Number of Times Voided: 1  Urine Color: Unable To Evaluate  Genitourinary Assessment   Bladder Assessment (WDL):  WDL Except  Rivera Catheter: Not Applicable  Rivera Reasons per MD Order: Acute urinary retention or bladder outlet obstruction  Rivera Care: Given with Soap and Water  Urinary  Elimination: Catheter (Document on LDA)  Urine Color: Unable To Evaluate  Bladder Device: Urinal    Skin  Nam Score   18  Sensory Interventions   Skin Preventative Measures: Pillows in Use for Support / Positioning  Moisture Interventions  Moisturizers/Barriers: Barrier Paste, Barrier Wipes  Containment Devices: Indwelling Catheter      Pain  Pain Rating Scale  7 - Focus of attention, prevents doing daily activities  Pain Location  Knee, Shoulder  Pain Location Orientation  Right  Pain Interventions   Medication (see MAR)    ADLs    Bathing   Shower, * * With Assistance from, Staff  Linen Change   Partial  Personal Hygiene  Change Charlotte Pads  Chlorhexidine Bath      Oral Care  Brushed Teeth  Teeth/Dentures  Broken Teeth (Comments)  Shave  Self  Nutrition Percentage Eaten  *  * Meal *  *, Lunch, Between % Consumed  Environmental Precautions  Treaded Slipper Socks on Patient, Transferred to Stronger Side, Bed in Low Position, Personal Belongings, Wastebasket, [place] etc. in Easy Reach  Patient Turns/Positioning  Patient Turns Self from Side to Side  Patient Turns Assistance/Tolerance  Assistance of One  Bed Positions  Bed Controls On  Head of Bed Elevated  Self regulated      Psychosocial/Neurologic Assessment  Psychosocial Assessment  Psychosocial (WDL):  Within Defined Limits  Patient Behaviors: Drowsy  Neurologic Assessment  Neuro (WDL): Within Defined Limits  Level of Consciousness: Alert  Orientation Level: Oriented X4  Cognition: Follows commands  Speech: Clear  Pupil Assesment: No  R Pupil Size (mm): 2  R Pupil Shape / Description: Round  R Pupil Reaction: Brisk  L Pupil Size (mm): 2  L Pupil Shape / Description: Round  L Pupil Reaction: Brisk  Motor Function/Sensation Assessment: Sensation  RUE Sensation: Full sensation  LUE Sensation: Full sensation  RLE Sensation: Full sensation  LLE Sensation: Full sensation  EENT (WDL):  WDL Except    Cardio/Pulmonary Assessment  Edema      Respiratory Breath  Sounds  RUL Breath Sounds: Diminished  RML Breath Sounds: Diminished  RLL Breath Sounds: Diminished  DONALD Breath Sounds: Clear  LLL Breath Sounds: Clear  Cardiac Assessment   Cardiac (WDL):  WDL Except (acute dastolitic heart failure, CHF)

## 2023-09-03 NOTE — THERAPY
Physical Therapy   Daily Treatment     Patient Name: Richard Hubbard II  Age:  48 y.o., Sex:  male  Medical Record #: [id number]  Today's Date: 9/3/2023     Precautions  Precautions: Fall Risk  Comments: Low BP, prosthesis LLE    Subjective    Patient agreeable to PT; reports he likes to work on LE strengthening on the shuttle     Objective       09/03/23 1001   PT Charge Group   PT Therapeutic Exercise (Units) 1   PT Therapeutic Activities (Units) 1   PT Total Time Spent   PT Individual Total Time Spent (Mins) 30   Precautions   Precautions Fall Risk   Comments Low BP, prosthesis LLE   Transfer Functional Level of Assist   Bed, Chair, Wheelchair Transfer Standby Assist   Supine Lower Body Exercise   Other Exercises LLE shuttle w/ prosthesis and 4 bands, 3 sets of 25   Interdisciplinary Plan of Care Collaboration   IDT Collaboration with  Occupational Therapist   Collaboration Comments treatment pass-off     Able to don lL LE prosthetic prior to therapy  Able to don right adaptive shoe with SPV from w/c level    Assessment    Able to complete 3 sets of 25 left LE shuttle with 4 bands with 1min rest break in between sets    Strengths: Able to follow instructions, Independent prior level of function, Motivated for self care and independence, Pleasant and cooperative, Supportive family, Willingly participates in therapeutic activities  Barriers: Decreased endurance, Fatigue, Generalized weakness, Hypotension, Impaired balance, Impaired activity tolerance, Limited mobility (ill fitting prosthesis, poly-joint RA)    Plan    Wc endurance, sit<>stand and progressive   Supine/ sidelying and prone mat program.  Posterior chain strength  Standing balance unsupported  Prosthetic gait training.    Passport items to be completed:  Get in/out of bed safely, in/out of a vehicle, safely use mobility device, walk or wheel around home/community, navigate up and down stairs, show how to get up/down from the ground, ensure home is  accessible, demonstrate HEP, complete caregiver training    Physical Therapy Problems (Active)       Problem: Balance       Dates: Start:  08/31/23         Goal: STG-Within one week, patient will maintain dynamic standing during standing ball toss on firm ground with supervision assist x 5 minutes       Dates: Start:  08/31/23               Problem: Mobility       Dates: Start:  08/31/23         Goal: STG-Within one week, patient will perform 6 minute walk test with FWW and CGA       Dates: Start:  08/31/23               Problem: PT-Long Term Goals       Dates: Start:  08/31/23         Goal: LTG-By discharge, patient will perform home exercise program mat table program with set up assist and minimal cuing       Dates: Start:  08/31/23

## 2023-09-03 NOTE — THERAPY
Occupational Therapy  Daily Treatment     Patient Name: Richard Hubbard II  Age:  48 y.o., Sex:  male  Medical Record #: [id number]  Today's Date: 9/3/2023     Precautions  Precautions: (P) Fall Risk  Comments: (P) Low BP, prosthesis LLE         Subjective    Pt was alert and cooperative w/ tx.     Objective       09/03/23 1401   Precautions   Precautions Fall Risk   Comments Low BP, prosthesis LLE   Vitals   Room Air Oximetry 95   O2 (LPM) 0   O2 Delivery Device None - Room Air   Pain   Intervention Rest;Repositioned   Pain 0 - 10 Group   Location Knee   Location Orientation Right   Description Aching;Constant   Comfort Goal Comfort with Movement;Perform Activity   Therapist Pain Assessment Prior to Activity;During Activity;Post Activity;Nurse Notified;7   Non Verbal Descriptors   Non Verbal Scale  Calm;Unlabored Breathing   Cognition    Cognition / Consciousness WDL   ABS (Agitated Behavior Scale)   Agitated Behavior Scale Performed No   Functional Level of Assist   Grooming Modified Independent;Seated   Grooming Description Increased time;Seated in wheelchair at sink   Lower Body Dressing Standby Assist   Lower Body Dressing Description Application of orthotic or brace;Increased time   Standing Upper Body Exercises   Comments Standing 4' from rebounder, CGA, unweighted medium ball 1x25 no LOB/no dropped balls; 1x25 4# ball, no LOB/no dropped balls; 1x25 unweight large ball no LOB/no dropped ball; standing 6' from rebounder 1x25 unweight large ball no LOB/no dropped ball .   Bed Mobility    Supine to Sit Modified Independent         Assessment    OT tx emphasis on ADL's and standing TherEx - see notes above.    Strengths: Able to follow instructions, Alert and oriented, Effective communication skills, Independent prior level of function, Pleasant and cooperative, Supportive family, Willingly participates in therapeutic activities  Barriers: Decreased endurance, Fatigue, Generalized weakness, Impaired activity  tolerance, Impaired balance    Plan    Refer to primary OT for POC.    Passport items to be completed:  Perform bathroom transfers, complete dressing, complete feeding, get ready for the day, prepare a simple meal, participate in household tasks, adapt home for safety needs, demonstrate home exercise program, complete caregiver training     Occupational Therapy Goals (Active)       Problem: Functional Transfers       Dates: Start:  08/24/23         Goal: STG-Within one week, patient will transfer to tub/shower with supervision using w/c and tub txfr bench.        Dates: Start:  08/24/23         Goal Note filed on 08/31/23 1105 by Aury Barbosa OT       Stand by                 Problem: IADL's       Dates: Start:  08/24/23         Goal: STG-Within one week, patient will access kitchen area at supv level using AE as needed.        Dates: Start:  08/24/23         Goal Note filed on 08/31/23 1105 by Aury Barbosa OT       Stand by                 Problem: OT Long Term Goals       Dates: Start:  08/21/23         Goal: LTG-By discharge, patient will complete basic self care tasks with mod I-SPV       Dates: Start:  08/21/23            Goal: LTG-By discharge, patient will perform bathroom transfers with mod I-SPV       Dates: Start:  08/21/23            Goal: LTG-By discharge, patient will complete basic home management with mod I-SPV from w/c level        Dates: Start:  08/21/23

## 2023-09-04 PROBLEM — R73.9 BLOOD GLUCOSE ELEVATED: Status: ACTIVE | Noted: 2023-01-01

## 2023-09-04 NOTE — PROGRESS NOTES
..                                                         NURSING DAILY NOTE    Name: Richard Hubbard II   Date of Admission: 8/20/2023   Admitting Diagnosis: Acute diastolic heart failure (HCC)  Attending Physician: Shaista Guerra D.o.  Allergies: Nsaids, Diphenhydramine, Mirtazapine, Penicillins, and Promethazine    Safety  Patient Assist  SBA  Patient Precautions  Fall Risk  Precaution Comments  Low BP, prosthesis LLE  Bed Transfer Status  Standby Assist  Toilet Transfer Status   Contact Guard Assist  Assistive Devices  Wheelchair  Oxygen  None - Room Air  Diet/Therapeutic Dining  Current Diet Order   Procedures    Diet Order Diet: Regular (For 8/23/23 breakfast, pt is requesting victor, eggs and pancakes.  Plus with all meals, he would like 2 cokes on his trays.  Thank you.)     Pill Administration  whole  Agitated Behavioral Scale     ABS Level of Severity       Fall Risk  Has the patient had a fall this admission?   No  Osiris Healy Fall Risk Scoring  16, HIGH RISK  Fall Risk Safety Measures  bed alarm, chair alarm, low vision/ hearing, and ok to leave pt in bathroom    Vitals  Temperature: 36.5 °C (97.7 °F)  Temp src: Oral  Pulse: 87  Respiration: 17  Blood Pressure: 108/64  Blood Pressure MAP (Calculated): 79 MM HG  BP Location: Left, Upper Arm  Patient BP Position: Sitting     Oxygen  Pulse Oximetry: 97 %  O2 (LPM): 0  FiO2%: 21 %  O2 Delivery Device: None - Room Air    Bowel and Bladder  Last Bowel Movement  09/03/23  Stool Type  Type 4: Like a sausage or snake, smooth and soft  Bowel Device  Bathroom  Continent  Bladder: Continent void (Rivera catheter)   Bowel: Continent movement  Bladder Function  Urine Void (mL): 250 ml  Number of Times Voided:  (numerous per patient)  Urine Color: Yellow  Number of Times Incontinent of Urine: 0  Genitourinary Assessment   Bladder Assessment (WDL):  Within Defined Limits  Rivera Catheter: Not Applicable  Rivera Reasons per MD Order: Acute urinary retention or  bladder outlet obstruction  Rivera Care: Given with Soap and Water  Urinary Elimination: Catheter (Document on LDA)  Urine Color: Yellow  Number of Bladder Accidents: 0  Total Number of Bladder of Accidents in Last 7 Days: 0  Number of Times Incontinent of Urine: 0  Bladder Device: Urinal    Skin  Nam Score   18  Sensory Interventions   Skin Preventative Measures: Pillows in Use for Support / Positioning  Moisture Interventions  Moisturizers/Barriers: Barrier Paste, Barrier Wipes  Containment Devices: Indwelling Catheter      Pain  Pain Rating Scale  7 - Focus of attention, prevents doing daily activities  Pain Location  Knee, Leg  Pain Location Orientation  Right, Left  Pain Interventions   Medication (see MAR)    ADLs    Bathing   Shower, * * With Assistance from, Staff  Linen Change   Partial  Personal Hygiene  Change Charlotte Pads  Chlorhexidine Bath      Oral Care  Brushed Teeth  Teeth/Dentures  Broken Teeth (Comments)  Shave  Self  Nutrition Percentage Eaten  *  * Meal *  *, Dinner, Between % Consumed  Environmental Precautions  Treaded Slipper Socks on Patient, Personal Belongings, Wastebasket, Call Bell etc. in Easy Reach, Transferred to [facility] Side, Bed in Low Position  Patient Turns/Positioning  Patient Turns Self from Side to Side  Patient Turns Assistance/Tolerance  * * Assistance, Assistance of One  Bed Positions  Bed Controls On, Bed Locked  Head of Bed Elevated  Self regulated      Psychosocial/Neurologic Assessment  Psychosocial Assessment  Psychosocial (WDL):  Within Defined Limits  Patient Behaviors: Depressed, Drowsy  Neurologic Assessment  Neuro (WDL): Exceptions to WDL (neuropathy + phantom)  Level of Consciousness: Alert  Orientation Level: Oriented X4  Cognition: Follows commands  Speech: Clear  Pupil Assesment: Yes  R Pupil Size (mm): 2  R Pupil Shape / Description: Round  R Pupil Reaction: Brisk  L Pupil Size (mm): 2  L Pupil Shape / Description: Round  L Pupil Reaction: Brisk  Motor  Function/Sensation Assessment: Sensation  RUE Sensation: Full sensation  LUE Sensation: Full sensation  RLE Sensation: Full sensation  LLE Sensation: Full sensation  EENT (WDL):  WDL Except    Cardio/Pulmonary Assessment  Edema      Respiratory Breath Sounds  RUL Breath Sounds: Diminished  RML Breath Sounds: Diminished  RLL Breath Sounds: Diminished  DONALD Breath Sounds: Clear  LLL Breath Sounds: Clear  Cardiac Assessment   Cardiac (WDL):  WDL Except (CHF)

## 2023-09-04 NOTE — CARE PLAN
"The patient is Stable - Low risk of patient condition declining or worsening    Shift Goals  Clinical Goals: [place]  Patient Goals: Pain control, participate in therapy  Family Goals: Education    Patient is not progressing towards the following goals:    Problem: Fall Risk - Rehab  Goal: Patient will remain free from falls  Outcome: Not Met  Note: Osiris Healy Fall risk Assessment Score: 16    High fall risk Interventions   - Bed and strip alarm   - Yellow sign by the door   - Yellow wrist band \"Fall risk\"  - Fall risk education provided     "

## 2023-09-04 NOTE — PROGRESS NOTES
NURSING DAILY NOTE    Name: Richard Hubbard II   Date of Admission: 8/20/2023   Admitting Diagnosis: Acute diastolic heart failure (HCC)  Attending Physician: Shaista Guerra D.o.  Allergies: Nsaids, Diphenhydramine, Mirtazapine, Penicillins, and Promethazine    Safety  Patient Assist  SBA  Patient Precautions  Fall Risk  Precaution Comments  Low BP, prosthesis LLE  Bed Transfer Status  Standby Assist  Toilet Transfer Status   Contact Guard Assist  Assistive Devices  Wheelchair  Oxygen  None - Room Air  Diet/Therapeutic Dining  Current Diet Order   Procedures    Diet Order Diet: Regular (For 8/23/23 breakfast, pt is requesting victor, eggs and pancakes.  Plus with all meals, he would like 2 cokes on his trays.  Thank you.)     Pill Administration  whole  Agitated Behavioral Scale     ABS Level of Severity       Fall Risk  Has the patient had a fall this admission?   No  Osiris Healy Fall Risk Scoring  18, HIGH RISK  Fall Risk Safety Measures  bed alarm, chair alarm, and poor balance    Vitals  Temperature: 36.7 °C (98.1 °F)  Temp src: Oral  Pulse: 87  Respiration: 16  Blood Pressure: 106/62  Blood Pressure MAP (Calculated): 77 MM HG  BP Location: Right, Upper Arm  Patient BP Position: Sitting     Oxygen  Pulse Oximetry: 94 %  O2 (LPM): 0  FiO2%: 21 %  O2 Delivery Device: None - Room Air    Bowel and Bladder  Last Bowel Movement  09/03/23  Stool Type  Type 4: Like a sausage or snake, smooth and soft  Bowel Device  Bathroom  Continent  Bladder: Continent void (Rivera catheter)   Bowel: Continent movement  Bladder Function  Urine Void (mL): 600 ml  Number of Times Voided: 1  Urine Color: Yellow  Genitourinary Assessment   Bladder Assessment (WDL):  WDL Except  Rivera Catheter: Not Applicable  Rivera Reasons per MD Order: Acute urinary retention or bladder outlet obstruction  Rivera Care: Given with Soap and Water  Urinary Elimination: Catheter (Document on  LDA)  Urine Color: Yellow  Bladder Device: Urinal    Skin  Nam Score   18  Sensory Interventions   Skin Preventative Measures: Pillows in Use for Support / Positioning  Moisture Interventions  Moisturizers/Barriers: Barrier Paste, Barrier Wipes  Containment Devices: Indwelling Catheter      Pain  Pain Rating Scale  7 - Focus of attention, prevents doing daily activities  Pain Location  Knee  Pain Location Orientation  Right  Pain Interventions   Rest, Repositioned    ADLs    Bathing   Shower, * * With Assistance from, Staff  Linen Change   Partial  Personal Hygiene  Change Charlotte Pads  Chlorhexidine Bath      Oral Care  Brushed Teeth  Teeth/Dentures  Broken Teeth (Comments)  Shave  Self  Nutrition Percentage Eaten  *  * Meal *  *, Dinner, Between % Consumed  Environmental Precautions  Treaded Slipper Socks on Patient, Personal Belongings, Wastebasket, Call Bell etc. in Easy Reach, Transferred to Stronger Side, Bed in Low Position  Patient Turns/Positioning  Patient Turns Self from Side to Side  Patient Turns Assistance/Tolerance  * * Assistance, Assistance of One  Bed Positions  Bed Controls On, Bed Locked  Head of Bed Elevated  Self regulated      Psychosocial/Neurologic Assessment  Psychosocial Assessment  Psychosocial (WDL):  Within Defined Limits  Patient Behaviors: Depressed, Drowsy  Neurologic Assessment  Neuro (WDL): Within Defined Limits  Level of Consciousness: Alert  Orientation Level: Oriented X4  Cognition: Follows commands  Speech: Clear  Pupil Assesment: Yes  R Pupil Size (mm): 2  R Pupil Shape / Description: Round  R Pupil Reaction: Brisk  L Pupil Size (mm): 2  L Pupil Shape / Description: Round  L Pupil Reaction: Brisk  Motor Function/Sensation Assessment: Sensation  RUE Sensation: Full sensation  LUE Sensation: Full sensation  RLE Sensation: Full sensation  LLE Sensation: Full sensation  EENT (WDL):  WDL Except    Cardio/Pulmonary Assessment  Edema      Respiratory Breath Sounds  RUL Breath  Sounds: Diminished  RML Breath Sounds: Diminished  RLL Breath Sounds: Diminished  DONALD Breath Sounds: Clear  LLL Breath Sounds: Clear  Cardiac Assessment   Cardiac (WDL):  WDL Except (CHF)

## 2023-09-04 NOTE — THERAPY
"Occupational Therapy   Discharge Summary     Patient Name: Richard Hubbard II  Age:  48 y.o., Sex:  male  Medical Record #: [id number]  Today's Date: 9/4/2023     Precautions  Precautions: Fall Risk  Comments: prosthesis LLE         Subjective    \"I would really like to work on some strength and walking since it is my last session\" - Pt agreeable to DC ADLs check off     Objective       09/04/23 0831   OT Charge Group   Charges Yes   OT Self Care / ADL (Units) 2   OT Neuromuscular Re-education / Balance (Units) 1   OT Therapeutic Exercise (Units) 1   OT Total Time Spent   OT Individual Total Time Spent (Mins) 60   Precautions   Precautions Fall Risk   Comments prosthesis LLE   Cognition    Cognition / Consciousness WDL   Orientation Level Oriented x 4   Level of Consciousness Alert   Ability To Follow Commands 3 Step   Functional Level of Assist   Grooming Modified Independent   Grooming Description Seated in wheelchair at sink   Bathing Modified Independent   Bathing Description Grab bar;Hand held shower;Tub bench;Increased time   Upper Body Dressing Modified Independent   Lower Body Dressing Modified Independent   Lower Body Dressing Description Increased time   Toileting Modified Independent   Toileting Description Increased time   Bed, Chair, Wheelchair Transfer Modified Independent   Bed Chair Wheelchair Transfer Description Increased time   Toilet Transfers Modified Independent   Toilet Transfer Description Increased time   Tub / Shower Transfers Modified Independent   Tub Shower Transfer Description Increased time   Sitting Upper Body Exercises   Chest Press 3 sets of 10  (vertical chest press @ equilizer 25#)   Bilateral Row 3 sets of 10  (B row @ equilizer 25#)   Neuro-Muscular Treatments   Neuro-Muscular Treatments Other (See Comments)  (Pt completed community-level ambulation distances outside and multi-varied terrains with 2ww, L prosthesis, and Mod Ind.)   Balance   Sitting Balance (Static) Good "   Sitting Balance (Dynamic) Fair +   Standing Balance (Static) Fair   Standing Balance (Dynamic) Fair -   Interdisciplinary Plan of Care Collaboration   IDT Collaboration with  Physical Therapist   Patient Position at End of Therapy Seated;Call Light within Reach;Tray Table within Reach   Collaboration Comments Mod I in room   Strengths & Barriers   Strengths Able to follow instructions;Alert and oriented;Effective communication skills;Independent prior level of function;Pleasant and cooperative;Supportive family;Willingly participates in therapeutic activities   Barriers Decreased endurance;Fatigue;Generalized weakness;Impaired activity tolerance;Impaired balance   Eating   Assistance Needed Independent   Physical Assistance Level No physical assistance   CARE Score - Eating 6   Oral Hygiene   Assistance Needed Independent   Physical Assistance Level No physical assistance   CARE Score - Oral Hygiene 6   Toileting Hygiene   Assistance Needed Independent   Physical Assistance Level No physical assistance   CARE Score - Toileting Hygiene 6   Shower/Bathe Self   Assistance Needed Independent   Physical Assistance Level No physical assistance   CARE Score - Shower/Bathe Self 6   Upper Body Dressing   Assistance Needed Independent   Physical Assistance Level No physical assistance   CARE Score - Upper Body Dressing 6   Lower Body Dressing   Assistance Needed Independent   Physical Assistance Level No physical assistance   CARE Score - Lower Body Dressing 6   Putting On/Taking Off Footwear   Assistance Needed Independent   Physical Assistance Level No physical assistance   CARE Score - Putting On/Taking Off Footwear 6   Toilet Transfer   Assistance Needed Independent   Physical Assistance Level No physical assistance   CARE Score - Toilet Transfer 6   Cognitive Pattern Assessment   Cognitive Pattern Assessment Used BIMS   Brief Interview for Mental Status (BIMS)   Repetition of Three Words (First Attempt) 3   Temporal  "Orientation: Year Correct   Temporal Orientation: Month Accurate within 5 days   Temporal Orientation: Day Correct   Recall: \"Sock\" Yes, no cue required   Recall: \"Blue\" Yes, no cue required   Recall: \"Bed\" Yes, no cue required   BIMS Summary Score 15   Confusion Assessment Method (CAM)   Is there evidence of an acute change in mental status from the patient's baseline? No   Inattention Behavior not present   Disorganized thinking Behavior not present   Altered level of consciousness Behavior not present   Discharge Summary    Discharge Location  Home   Patient Discharging with Assist of Family    Level of Supervision Required Intermittent Supervision   Recommended Equipment for Discharge Front-Wheeled Walker   Recommended Services Upon Discharge No Follow-Up Occupational Therapy Recommended   Long Term Goals Met 3   Long Term Goals Not Met 0   Reason(s) for Goals Not Met na   Criteria for Termination of Services Maximum Function Achieved for Inpatient Rehabilitation   Discharge Instructions to Patient   Level of Assist Required for Eating Able to Complete Eating without Assist   Level of Assist Required for Grooming Able to Complete Grooming without Assist   Level of Assist Required for Dressing Able to Complete Dressing without Assist   Level of Assist Required for Toileting Able to Complete Toileting without Assist   Level of Assist Required for Toilet Transfer Able to Complete Toilet Transfer without Assist   Equipment for Toilet Transfer Grab Bars by Toilet   Level of Assist Required for Bathing Able to Complete Bathing without Assist   Equipment for Bathing Shower Chair;Grab Bars in Tub / Shower   Level of Assist Required for Shower Transfer Able to Complete Shower Transfer without Assist   Equipment for Shower Transfer Grab Bars in Tub / Shower   Level of Assist Required for Home Mgmt Able to Complete Home Management without Assist   Level of Assist Required for Meal Prep Able to Complete Meal Preparation " without Assist   Driving Please Contact Physician Prior to Driving   Home Exercise Program Refer to Home Exercise Program Handout for Details       Assessment    Pt with good tolerance to OT treatment on this date focused on DC ADLs, community level ambulation, and UE strength. Pt demonstrates ability to be Mod I with all completed tasks as well as good carry over for safety precautions. He has good understanding of strength training/HEP for progression of abilities and reports no further concerns with return home.     Strengths: Able to follow instructions, Alert and oriented, Effective communication skills, Independent prior level of function, Pleasant and cooperative, Supportive family, Willingly participates in therapeutic activities  Barriers: Decreased endurance, Fatigue, Generalized weakness, Impaired activity tolerance, Impaired balance    Plan  DC home with family 9/5. All goals met.     Occupational Therapy Goals (Active)       There are no active problems.

## 2023-09-04 NOTE — THERAPY
Physical Therapy   Daily Treatment     Patient Name: Richard Hubbard II  Age:  48 y.o., Sex:  male  Medical Record #: [id number]  Today's Date: 9/4/2023     Precautions  Precautions: Fall Risk  Comments: Low BP, prosthesis LLE    Subjective    Ready for PT     Objective       09/04/23 0945   PT Charge Group   PT Gait Training (Units) 2   PT Therapeutic Activities (Units) 1   Supervising Physical Therapist Virgen Bryant   PT Total Time Spent   PT Individual Total Time Spent (Mins) 45   Gait Functional Level of Assist    Gait Level Of Assist Standby Assist   Assistive Device Front Wheel Walker   Distance (Feet)   (250' x 2 and 100' x 1 outdoors)   Deviation Bradykinetic   Wheelchair Functional Level of Assist   Wheelchair Assist Modified Independent   Distance Wheelchair (Feet or Distance) 200x2   Wheelchair Description Extra time;Adaptive equipment   Stairs Functional Level of Assist   Level of Assist with Stairs Standby Assist   # of Stairs Climbed 12   Stairs Description Artifical limb(s);Extra time;Hand rails;Supervision for safety   Transfer Functional Level of Assist   Bed, Chair, Wheelchair Transfer Supervised   Bed Chair Wheelchair Transfer Description Increased time;Supervision for safety   Bed Mobility    Sit to Stand Modified Independent   Interdisciplinary Plan of Care Collaboration   Patient Position at End of Therapy Seated;Self Releasing Lap Belt Applied;Call Light within Reach   Roll Left and Right   Assistance Needed Independent   CARE Score - Roll Left and Right 6   Sit to Lying   Assistance Needed Independent   CARE Score - Sit to Lying 6   Lying to Sitting on Side of Bed   Assistance Needed Independent   CARE Score - Lying to Sitting on Side of Bed 6   Sit to Stand   Assistance Needed Adaptive equipment;Set-up / clean-up   CARE Score - Sit to Stand 5   Chair/Bed-to-Chair Transfer   Assistance Needed Set-up / clean-up   CARE Score - Chair/Bed-to-Chair Transfer 5   Car Transfer   Assistance Needed  Set-up / clean-up;Adaptive equipment   CARE Score - Car Transfer 5   Walk 10 Feet   Assistance Needed Adaptive equipment;Set-up / clean-up   CARE Score - Walk 10 Feet 5   Walk 50 Feet with Two Turns   Assistance Needed Adaptive equipment;Set-up / clean-up   CARE Score - Walk 50 Feet with Two Turns 5   Walk 150 Feet   Assistance Needed Set-up / clean-up;Adaptive equipment   CARE Score - Walk 150 Feet 5   Walking 10 Feet on Uneven Surfaces   Assistance Needed Adaptive equipment;Set-up / clean-up;Supervision   CARE Score - Walking 10 Feet on Uneven Surfaces 4   1 Step (Curb)   Assistance Needed Adaptive equipment;Supervision;Verbal cues   CARE Score - 1 Step (Curb) 4   4 Steps   Assistance Needed Supervision   CARE Score - 4 Steps 4   12 Steps   Assistance Needed Supervision   CARE Score - 12 Steps 4   Picking Up Object   Assistance Needed Adaptive equipment   CARE Score - Picking Up Object 6   Wheel 50 Feet with Two Turns   Assistance Needed Independent   CARE Score - Wheel 50 Feet with Two Turns 6   Wheel 150 Feet   Assistance Needed Independent   CARE Score - Wheel 150 Feet 6         Assessment    Pt tolerated session well with focus on collection of DC IRF ARTURO data.  Strengths: Able to follow instructions, Independent prior level of function, Motivated for self care and independence, Pleasant and cooperative, Supportive family, Willingly participates in therapeutic activities  Barriers: Decreased endurance, Fatigue, Generalized weakness, Hypotension, Impaired balance, Impaired activity tolerance, Limited mobility (ill fitting prosthesis, poly-joint RA)    Plan    Dc home 9/5    Passport items to be completed:  Get in/out of bed safely, in/out of a vehicle, safely use mobility device, walk or wheel around home/community, navigate up and down stairs, show how to get up/down from the ground, ensure home is accessible, demonstrate HEP, complete caregiver training    Physical Therapy Problems (Active)       Problem:  Balance       Dates: Start:  08/31/23         Goal: STG-Within one week, patient will maintain dynamic standing during standing ball toss on firm ground with supervision assist x 5 minutes       Dates: Start:  08/31/23               Problem: Mobility       Dates: Start:  08/31/23         Goal: STG-Within one week, patient will perform 6 minute walk test with FWW and CGA       Dates: Start:  08/31/23               Problem: PT-Long Term Goals       Dates: Start:  08/31/23         Goal: LTG-By discharge, patient will perform home exercise program mat table program with set up assist and minimal cuing       Dates: Start:  08/31/23

## 2023-09-04 NOTE — CARE PLAN
Problem: OT Long Term Goals  Goal: LTG-By discharge, patient will complete basic self care tasks with mod I-SPV  Outcome: Met  Goal: LTG-By discharge, patient will perform bathroom transfers with mod I-SPV  Outcome: Met  Goal: LTG-By discharge, patient will complete basic home management with mod I-SPV from w/c level   Outcome: Met     Problem: Functional Transfers  Goal: STG-Within one week, patient will transfer to tub/shower with supervision using w/c and tub txfr bench.   Outcome: Met     Problem: IADL's  Goal: STG-Within one week, patient will access kitchen area at supv level using AE as needed.   Outcome: Met

## 2023-09-04 NOTE — PROGRESS NOTES
Hospital Medicine Daily Progress Note        Chief Complaint:  Volume overload    Interval History:  No new complaints.  Last 24 hrs I/O -1720 mL.  Labs reviewed and discussed.    Review of Systems  Review of Systems   Constitutional:  Negative for chills and fever.   HENT: Negative.     Eyes: Negative.    Respiratory:  Negative for cough and shortness of breath.    Cardiovascular:  Positive for leg swelling. Negative for chest pain and palpitations.   Gastrointestinal:  Negative for abdominal pain, nausea and vomiting.   Genitourinary: Negative.    Musculoskeletal: Negative.    Skin:  Negative for itching and rash.   Endo/Heme/Allergies:  Negative for polydipsia. Does not bruise/bleed easily.        Physical Exam  Temp:  [36.5 °C (97.7 °F)-36.7 °C (98.1 °F)] 36.6 °C (97.8 °F)  Pulse:  [74-90] 74  Resp:  [16-17] 17  BP: ()/(59-75) 109/63  SpO2:  [94 %-97 %] 96 %    Physical Exam  Vitals reviewed.   Constitutional:       General: He is not in acute distress.     Appearance: Normal appearance. He is not ill-appearing.   HENT:      Head: Normocephalic and atraumatic.      Right Ear: External ear normal.      Left Ear: External ear normal.      Nose: Nose normal.      Mouth/Throat:      Pharynx: Oropharynx is clear.   Eyes:      General:         Right eye: No discharge.         Left eye: No discharge.      Extraocular Movements: Extraocular movements intact.      Conjunctiva/sclera: Conjunctivae normal.   Cardiovascular:      Rate and Rhythm: Normal rate and regular rhythm.   Pulmonary:      Effort: No respiratory distress.      Breath sounds: No wheezing.      Comments: Faint crackles  Abdominal:      General: Bowel sounds are normal. There is no distension.      Palpations: Abdomen is soft.      Tenderness: There is no abdominal tenderness.   Musculoskeletal:      Cervical back: Normal range of motion and neck supple.      Right lower leg: Edema present.      Comments: L BKA   Skin:     General: Skin is warm and  dry.   Neurological:      Mental Status: He is alert and oriented to person, place, and time.         Fluids    Intake/Output Summary (Last 24 hours) at 9/4/2023 1017  Last data filed at 9/4/2023 0900  Gross per 24 hour   Intake 1600 ml   Output 4225 ml   Net -2625 ml       Laboratory  Recent Labs     09/04/23  0538   WBC 5.3   RBC 3.65*   HEMOGLOBIN 10.1*   HEMATOCRIT 32.6*   MCV 89.3   MCH 27.7   MCHC 31.0*   RDW 53.2*   PLATELETCT 234   MPV 9.8     Recent Labs     09/04/23  0538   SODIUM 137   POTASSIUM 4.4   CHLORIDE 105   CO2 25   GLUCOSE 93   BUN 19   CREATININE 0.81   CALCIUM 8.3*                 Assessment/Plan  Cardiac volume overload  Assessment & Plan  Echo EF 55%, RVSP 50 mmHg, mod MR/AS/AR  S/P IV Lasix then changed to PO Torsemide prior to Rehab admission  Torsemide discontinued for hypotension  Also on Farxiga  CXR 8/29/23 worsening pulm edema  Resumed low dose Lasix and diuresing well (I/O negative 2 to 3 L every 24 hours)  F/U CXR 9/1/23 persistent pulm edema, atx  Continue to diuresis as tolerated  Also on IS  Needs outpt Cardiology F/U    Anemia  Assessment & Plan  Fe 32, S/P IV Fe  FOB negative x 2 thus far  Follow H/H    History of gastric bypass  Assessment & Plan  Monitor electrolytes    S/P BKA (below knee amputation) unilateral, left (HCC)- (present on admission)  Assessment & Plan  Has prosthesis    Vitamin D deficiency- (present on admission)  Assessment & Plan  Vit D level 21  On supplementation    Chronic pain syndrome- (present on admission)  Assessment & Plan  On Morphine  Management per Physiatry    History of atrial fibrillation ( single episode 2019)- (present on admission)  Assessment & Plan  S/P cardioversion in 2019  On no anti-plts or AC due to h/o GIB    Rheumatoid arthritis (HCC)- (present on admission)  Assessment & Plan  Presently on Prednisone  Also chronically on Humira  Rheumatology F/U    History of bleeding ulcers- (present on admission)  Assessment & Plan  Continue PPI  bid       Full Code    Reviewed w/ pt and Dr. Guerra

## 2023-09-04 NOTE — DISCHARGE SUMMARY
Physical Medicine & Rehabilitation Discharge Summary    Admission Date: 8/20/2023    Discharge Date: 9/5/2023    Attending Provider: Keaton Pisano MD/PhD    Admission Diagnosis:   Active Hospital Problems    Diagnosis     *Acute diastolic heart failure (HCC)     Blood glucose elevated     Cough     Cardiac volume overload     Anemia     History of gastric bypass     Acute on chronic heart failure with preserved ejection fraction (HFpEF) (HCC)     CHF (congestive heart failure), NYHA class I, acute on chronic, combined (HCC)     Mitral valve mass     Cardiorenal syndrome     Elevated liver function tests     S/P BKA (below knee amputation) unilateral, left (HCC)     Vitamin D deficiency     Generalized weakness     Impaired mobility and ADLs     Chronic pain syndrome     Chronic use of opiate drug for therapeutic purpose     History of immunosuppressive therapy     History of atrial fibrillation ( single episode 2019)     Aortic root dilatation (HCC)     Rheumatoid arthritis (HCC)     History of bleeding ulcers     MONICA (obstructive sleep apnea)     Mood disorder (HCC)     ELISEO (generalized anxiety disorder)        Discharge Diagnosis:  Active Hospital Problems    Diagnosis     *Acute diastolic heart failure (HCC)     Blood glucose elevated     Cough     Cardiac volume overload     Anemia     History of gastric bypass     Acute on chronic heart failure with preserved ejection fraction (HFpEF) (HCC)     CHF (congestive heart failure), NYHA class I, acute on chronic, combined (HCC)     Mitral valve mass     Cardiorenal syndrome     Elevated liver function tests     S/P BKA (below knee amputation) unilateral, left (HCC)     Vitamin D deficiency     Generalized weakness     Impaired mobility and ADLs     Chronic pain syndrome     Chronic use of opiate drug for therapeutic purpose     History of immunosuppressive therapy     History of atrial fibrillation ( single episode 2019)     Aortic root dilatation (HCC)      Rheumatoid arthritis (HCC)     History of bleeding ulcers     MONICA (obstructive sleep apnea)     Mood disorder (HCC)     ELISEO (generalized anxiety disorder)        HPI per Admission History & Physical:  Patient is a 48-year-old male with past medical history of GERD, hypertension, severe rheumatoid arthritis previously on Humira, chronic pain history of BKA (completed inpatient rehab course3/21/2023 - 4/12/2023, depression and CHF who presented to [facility] on 8/12 with worsening shortness of breath.  Per documentation, patient had a recent hospitalization in July 2023 with cellulitis of the right lower extremity.  During that hospitalization an echo was completed that showed severe aortic regurgitation, moderate aortic stenosis and mild to moderate mitral regurgitation as well as some calcified mitral mass measuring 3 cm.  Patient returned to the emergency department with shortness of breath.  At that time work-up showed pulmonary edema, labs notable for hyponatremia and elevated creatinine.  Patient required admission to the ICU, and patient was diuresed with IV Lasix.  Cardiology was consulted who recommended continued diuresis.  Patient's hospital course was notable for worsening liver function, right upper quadrant ultrasound was obtained and GI was consulted.  Right upper quadrant ultrasound was negative for acute findings and hepatitis panel was negative.  Patient's AST and ALT continued to improve.  Patient required both IV fluid hydration as well as diuresis for cardiorenal syndrome.  Patient's kidney function has improved, creatinine 1.028/19.  Patient has been newly started on torsemide.  Patient continues to require Humira injections every 2 weeks.  Of note he is also a chronic pain patient, patient is chronically on high-dose morphine at morphine 30 mg 4 times a day and MS Contin 15 mg twice a day.     Patient seen and examined at bedside.  Patient reports feeling tired and fatigued.  Otherwise  denies lightheadedness or shortness of breath.  Patient denies chest pain or coughing.  Patient has not had any episodes of lightheadedness or dizziness when attempting to get up with therapies.  Patient reports that the edema in his residual limb has fluctuated since being on diuresis.  Patient's wife is bringing his prosthesis, discussed with patient plan to adjust sock ply as needed.  Patient is asking about his home dose morphine.  Assured patient is chronic pain meds have not been changed.  Also reviewed with patient that we will not be increasing his chronic pain medications.  Patient denies being in pain today.  Reviewed with patient tomorrow he will be on a new medication called torsemide, reviewed with patient to monitor for hypotension or lightheadedness.  Patient denies any increased rheumatoid arthritis flares.  Reports his last Humira injection was last Wednesday, 8/16.    Patient was admitted to [facility] on 8/20/2023.     Hospital Course by Problem List:  CHF exacerbation  - Originally presented with shortness of breath and pulmonary edema as well as cardiorenal syndrome  -While at [facility], patient was on IV Lasix for diuresis  - Cardiology was consulted at [facility] for patient's known aortic and mitral regurgitation, EF 55%  - Patient has been transferred on torsemide 40 mg daily, discussed new medication with [facility] hospitalist Dr. Wynne.  - Consult hospitalist  - Continue farga   -PT and OT for mobility and ADLs. Per guidelines, 15 hours per week between PT, OT and/or SLP.  -Follow-up with cardiology     Cough  COVID PENDING  CXR PENDING     Hypokalemia  - Transferred on potassium 10 mg daily. K + normal. Off of supplement. 8/29 BMP with normal K+.      Anxiety depression  - Continue Cymbalta      Anemia  - worse on 8/24/23, 9.7 from 11.3. Held heparin as history of GI bleeds. FOBT negative 8/25 x1, repeat PENDING. H/H stable in 9's. IV iron 8/28. CBC 8/29 -  stable 9.4 Monitor. 9/1 reduced 8/7.      Insomnia  - Avoiding trazodone, patient had prolonged QTc from prior EKG  - Replace standing as needed trazodone for melatonin. Poor improvement with melatonin. Will trial Ambien as has tolerated in the past.      Rheumatoid arthritis  - Is on Humira q. 2 weeks  - Last dose 8/16--> due 8/30 (has to have home supply, not in pharmacy formulary)  - Has previously been on steroids, has not been transferred to [facility] rehab on steroids, may consider restarting if patient has an RA flare  - Restart Prednisone, shoulder pain limiting. Start prednisone 10mg BID 8/29. Pain improved 8/30 continue scheduled steroids. 8/31 leg pain better on steroids. Continue scheduled steroids.      History of left BKA  - Previously at rehab in March/April 2023  - Monitor for edema control and residual limb for proper fit with prosthesis, wife will be bringing left BKA prosthesis     Chronic pain  - Chronically followed by pain management - [facility]  - Is on home dose morphine 30 mg 4 times a day as well as MS Contin 15 mg twice daily  - Do not anticipate patient will need increased as needed pain medications  - Naloxone as needed narcotics overdose. Start Lyrica 100 mg TID. 8/28 Increase Lyrica to 150mg TID. 8/30 continue Lyrica 150mg TID.     I discussed the risks and benefits of using opiate medications for pain control.  I discussed the risk of addiction, potential for overdose, and respiratory depression (and the potential need for opiate antagonist therapy if this occurs).  I encouraged the patient to take this medication sparingly with the expressed goal of weaning off the medication as soon as is clinically appropriate.  I informed the patient that we are only able to provide a 16 day supply of these medications at discharge and that they will be responsible for requesting any refills needed from their primary care provider or their surgeon.  We discussed the need to safely  secure these medications to prevent theft, inadvertent ingestion, or misuse.  Any unused medication should be immediately disposed of through a sanctioned medication disposal program.  We discussed adjunctive pain medications and conservative therapies at length.      I answered the patient's questions regarding this treatment, and the patient indicated understanding and willingness to proceed.    PDMP reviewed - patient last had 30 day supply of pain medications filled 7/20/23. He missed his appointment with Pain Management due to hospitalization. Currently wrote for 16 days of pain medications to get him to his appointment with Pain Management scheduled 9/20/23.        Vitamin D deficiency  21 on admission.  - Start 1000 U      Bladder   - Rivera Dc'd 8/25/2023; Voiding volitionally.     Skin - Patient at risk for skin breakdown due to debility in areas including sacrum, achilles, elbows and head in addition to other sites. Nursing to assess skin daily.      GI Ppx - Patient on Prilosec for GERD prophylaxis. Patient on Senna-docusate for constipation prophylaxis.         DVT PROPHYLAXIS: None - had been on Heparin, d/c 8/24 due to ambulating > 125 feet and having h/o GI bleed and bleeding day of 8/24     HOSPITALIST FOLLOWING: Yes     CODE STATUS: FULL CODE     DISPO: Home with family. Has equipment. Family not available for  until Tuesday night.      JESSICA: 9/5/23 due to pharmacy closures for holiday and availability to get a ride.     Functional Status at Discharge  Eating:  Independent  Eating Description:     Grooming:  Modified Independent, Seated  Grooming Description:  Increased time, Seated in wheelchair at sink  Bathing:  Standby Assist (full body shower)  Bathing Description:  Grab bar, Hand rails, Hand held shower, Long handled bath tool, Tub bench, Increased time, Set up for shower sleeve, Supervision for safety  Upper Body Dressing:  Modified Independent (including item retreival)  Upper Body  "Dressing Description:  Increased time, Supervision for safety  Lower Body Dressing:  Standby Assist  Lower Body Dressing Description:  Application of orthotic or brace, Increased time     Walk:  Standby Assist  Distance Walked:  400  Number of Times Distance Was Traveled:  3  Assistive Device:  Front Wheel Walker  Gait Deviation:  Antalgic, Bradykinetic (occasional left LE scissoring steps, can self-correct with verbal cues)  Wheelchair:  Modified Independent  Distance Propelled:  864   Wheelchair Description:  Adaptive equipment, Extra time  Stairs Contact Guard Assist  Stairs Description Artifical limb(s), Extra time, Hand rails, Limited by fatigue, Supervision for safety, Verbal cueing, Walker, Safety concerns (cues for \"up with the good, down with the bad\", attempts to perform reciprocal stair climbing. Demonstrates more stability when ascending with RLE leading, descending with LLE leading.)     Comprehension:     Comprehension Description:     Expression:     Expression Description:     Social Interaction:     Social Interaction Description:     Problem Solving:     Problem Solving Description:     Memory:     Memory Description:          IKeaton M.D., personally performed a complete drug regimen review and no potential clinically significant medication issues were identified.   Discharge Medication:     Medication List        START taking these medications        Instructions   Acetaminophen Extra Strength 500 MG Tabs   Take 2 Tablets by mouth in the morning, at noon, and at bedtime.  Dose: 1,000 mg     furosemide 20 MG Tabs  Commonly known as: Lasix   Take 1 Tablet by mouth every day.  Dose: 20 mg     lidocaine 5 % Ptch  Commonly known as: Lidoderm   Place 2 Patches on the skin every 24 hours.  Dose: 2 Patch     melatonin 3 MG Tabs   Take 1 Tablet by mouth at bedtime.  Dose: 3 mg     omeprazole 20 MG delayed-release capsule  Commonly known as: PriLOSEC   Take 1 Capsule by mouth 2 times a " day.  Dose: 20 mg     predniSONE 10 MG Tabs  Commonly known as: Deltasone   Take 1 Tablet by mouth 2 times a day.  Dose: 10 mg     zolpidem 5 MG Tabs  Commonly known as: Ambien   Take 1 Tablet by mouth at bedtime as needed for Sleep for up to 16 days.  Dose: 5 mg            CHANGE how you take these medications        Instructions   D3-1000 1000 UNIT Tabs  What changed:   medication strength  how much to take  Generic drug: vitamin D   Take 1 Tablet by mouth every day.  Dose: 1,000 Units     pregabalin 150 MG Caps  What changed:   medication strength  how much to take  when to take this  Commonly known as: Lyrica   Take 1 Capsule by mouth 3 times a day for 16 days. Indications: Neuropathic Pain  Dose: 150 mg            CONTINUE taking these medications        Instructions   albuterol 108 (90 Base) MCG/ACT Aers inhalation aerosol   Inhale 2 Puffs every 6 hours as needed for Shortness of Breath.  Dose: 2 Puff     dapagliflozin propanediol 10 MG Tabs  Commonly known as: Farxiga   Take 1 Tablet by mouth every day for 90 days.  Dose: 10 mg     DULoxetine 60 MG Cpep delayed-release capsule  Commonly known as: Cymbalta   Take 1 Capsule by mouth every day.  Dose: 60 mg     * morphine ER 15 MG Tbcr tablet  Commonly known as: Ms Contin   Take 1 Tablet by mouth 2 times a day for 16 days.  Dose: 15 mg     * morphine 30 MG tablet  Commonly known as: MS IR   Take 1 Tablet by mouth 4 times a day for 16 days.  Dose: 30 mg     Naloxone 4 MG/0.1ML Liqd  Commonly known as: Narcan   One spray in one nostril for overdose and call 911.     potassium chloride SA 10 MEQ Tbcr  Commonly known as: K-Dur   Take 1 Tablet by mouth every day for 90 days.  Dose: 10 mEq           * This list has 2 medication(s) that are the same as other medications prescribed for you. Read the directions carefully, and ask your doctor or other care provider to review them with you.                STOP taking these medications      amoxicillin-clavulanate [id number]  MG Tabs  Commonly known as: Augmentin     CALCIUM PO     Humira Pen 40 MG/0.4ML Pnkt  Generic drug: Adalimumab     Movantik 25 MG Tabs  Generic drug: Naloxegol Oxalate     torsemide 20 MG Tabs  Commonly known as: Demadex              Discharge Diet:  Current Diet Order   Procedures    Diet Order Diet: Regular (For 8/23/23 breakfast, pt is requesting victor, eggs and pancakes.  Plus with all meals, he would like 2 cokes on his trays.  Thank you.)       Discharge Activity:  Per PT/OT    Disposition:  Patient to discharge home with family support and community resources.    Equipment:  Per PT/OT    Follow-up & Discharge Instructions:  Follow up with your primary care provider (PCP) within 7-10 days of discharge to review your medications and take over your care.     If you develop chest pain, fever, chills, change in neurologic function (weakness, sensation changes, vision changes), or other concerning sxs, seek immediate medical attention or call 911.      Future Appointments   Date Time Provider Department Center   9/4/2023  2:30 PM Meg Gregory PTA [facility] None   9/14/2023  8:40 AM Mazin Jackson D.O. [facility] None       Condition on Discharge:  Good    More than 31 minutes was spent on discharging this patient, including face-to-face time, prescription management, and the dictation of this note.    Keaton Pisano M.D.    Date of Service: 9/5/2023

## 2023-09-05 NOTE — CARE PLAN
The patient is Stable - Low risk of patient condition declining or worsening    Shift Goals  Clinical Goals: safety  Patient Goals: d/c home  Family Goals: Education    Progress made toward(s) clinical / shift goals:  2    Problem: Skin Integrity  Goal: Patient's skin integrity will be maintained or improve  Outcome: Progressing: dressing of hydrofiber ag covered by silicon adhesive guauze to right plantar foot changed. Updated photo taken. Rash to groin nearly resolved. Pt showered today and verbalizes appropriate preventative skin care measures.     Problem: Bowel Elimination  Goal: Patient will participate in bowel management program  Outcome: Progressing: Scheduled senokot administered per MAR. Pt continent of lg bm today.

## 2023-09-05 NOTE — DISCHARGE PLANNING
Case management  Reviewed signed copy of IMM and answered all questions.    Dc date /disposition: today with home health.

## 2023-09-05 NOTE — PROGRESS NOTES
..                                                         NURSING DAILY NOTE    Name: Richard Hubbard II   Date of Admission: 8/20/2023   Admitting Diagnosis: Acute diastolic heart failure (HCC)  Attending Physician: Shaista Guerra D.o.  Allergies: Nsaids, Diphenhydramine, Mirtazapine, Penicillins, and Promethazine    Safety  Patient Assist  SBA  Patient Precautions  Fall Risk  Precaution Comments  prosthesis LLE  Bed Transfer Status  Supervised  Toilet Transfer Status   Modified Independent  Assistive Devices  Wheelchair  Oxygen  None - Room Air  Diet/Therapeutic Dining  Current Diet Order   Procedures    Diet Order Diet: Regular (For 8/23/23 breakfast, pt is requesting victor, eggs and pancakes.  Plus with all meals, he would like 2 cokes on his trays.  Thank you.)     Pill Administration  whole  Agitated Behavioral Scale     ABS Level of Severity       Fall Risk  Has the patient had a fall this admission?   No  Osiris Healy Fall Risk Scoring  16, HIGH RISK  Fall Risk Safety Measures  bed alarm, chair alarm, and ok to leave pt in bathroom    Vitals  Temperature: 36.7 °C (98 °F)  Temp src: Oral  Pulse: 92  Respiration: 18  Blood Pressure: 107/65  Blood Pressure MAP (Calculated): 79 MM HG  BP Location: Right, Upper Arm  Patient BP Position: Supine     Oxygen  Pulse Oximetry: 98 %  O2 (LPM): 0  FiO2%: 21 %  O2 Delivery Device: None - Room Air    Bowel and Bladder  Last Bowel Movement  09/04/23  Stool Type  Type 4: Like a sausage or snake, smooth and soft  Bowel Device  Bathroom  Continent  Bladder: Continent void (Rivera catheter)   Bowel: Continent movement  Bladder Function  Urine Void (mL): 800 ml  Number of Times Voided: 2  Urine Color: Unable To Evaluate  Number of Times Incontinent of Urine: 0  Genitourinary Assessment   Bladder Assessment (WDL):  Within Defined Limits  Rivera Catheter: Not Applicable  Rivera Reasons per MD Order: Acute urinary retention or bladder outlet obstruction  Rivera Care: Given  with Soap and Water  Urinary Elimination: Catheter (Document on LDA)  Urine Color: Unable To Evaluate  Number of Bladder Accidents: 0  Total Number of Bladder of Accidents in Last 7 Days: 0  Number of Times Incontinent of Urine: 0  Bladder Device: Urinal    Skin  Nam Score   18  Sensory Interventions   Bed Types: Standard/Trauma Mattress  Skin Preventative Measures: Pillows in Use to Float Heels, Pillows in Use for Support / Positioning  Moisture Interventions  Moisturizers/Barriers: Barrier Wipes  Containment Devices: Indwelling Catheter      Pain  Pain Rating Scale  6 - Hard to ignore, avoid usual activities  Pain Location  Foot, Shoulder, Knee  Pain Location Orientation  Right  Pain Interventions   Medication (see MAR)    ADLs    Bathing   Shower, Self Care, No Assistance Required (SB A)  Linen Change   Partial  Personal Hygiene  Change Charlotte Pads  Chlorhexidine Bath      Oral Care  Brushed Teeth  Teeth/Dentures  Broken Teeth (Comments)  Shave  Self  Nutrition Percentage Eaten  *  * Meal *  *, Dinner, Between % Consumed  Environmental Precautions  Treaded Slipper Socks on Patient, Personal Belongings, Wastebasket, Call Bell etc. in Easy Reach, Transferred to Stronger Side, Bed in Low Position  Patient Turns/Positioning  Patient Turns Self from Side to Side  Patient Turns Assistance/Tolerance  * * Assistance, Assistance of One  Bed Positions  Bed Controls On, Bed Locked  Head of Bed Elevated  Self regulated      Psychosocial/Neurologic Assessment  Psychosocial Assessment  Psychosocial (WDL):  Within Defined Limits  Patient Behaviors: Depressed, Drowsy  Neurologic Assessment  Neuro (WDL): Exceptions to WDL (neuropathy + phantom)  Level of Consciousness: Alert  Orientation Level: Oriented X4  Cognition: Follows commands  Speech: Clear  Pupil Assesment: Yes  R Pupil Size (mm): 3  R Pupil Shape / Description: Round  R Pupil Reaction: Brisk  L Pupil Size (mm): 3  L Pupil Shape / Description: Round  L Pupil  Reaction: Brisk  Motor Function/Sensation Assessment: Sensation  RUE Sensation: Full sensation  LUE Sensation: Full sensation  RLE Sensation: Full sensation  LLE Sensation: Full sensation  EENT (WDL):  WDL Except    Cardio/Pulmonary Assessment  Edema   RLE Edema: 2+  Respiratory Breath Sounds  RUL Breath Sounds: Clear  RML Breath Sounds: Clear  RLL Breath Sounds: Clear  DONALD Breath Sounds: Clear  LLL Breath Sounds: Clear  Cardiac Assessment   Cardiac (WDL):  WDL Except (CHF)

## 2023-09-05 NOTE — CARE PLAN
The patient is Stable - Low risk of patient condition declining or worsening    Shift Goals  Clinical Goals: Safety, pain control, discharge  Patient Goals: Discharge  Family Goals: Education    Progress made toward(s) clinical / shift goals:  Discharge today    Patient is not progressing towards the following goals:      Problem: Knowledge Deficit - Standard  Goal: Patient and family/care givers will demonstrate understanding of plan of care, disease process/condition, diagnostic tests and medications  Outcome: Met     Problem: Pain - Standard  Goal: Alleviation of pain or a reduction in pain to the patient’s comfort goal  Outcome: Met     Problem: Fall Risk - Rehab  Goal: Patient will remain free from falls  Outcome: Met     Problem: Psychosocial  Goal: Patient's level of anxiety will decrease  Outcome: Met  Goal: Patient's ability to verbalize feelings about condition will improve  Outcome: Met  Goal: Patient's ability to re-evaluate and adapt role responsibilities will improve  Outcome: Met  Goal: Patient and family will demonstrate ability to cope with life altering diagnosis and/or procedure  Outcome: Met  Goal: Spiritual and cultural needs incorporated into hospitalization  Outcome: Met     Problem: Bladder / Voiding  Goal: Patient will establish and maintain bladder regimen  Outcome: Met     Problem: Skin Integrity  Goal: Patient's skin integrity will be maintained or improve  Outcome: Met     Problem: Nutrition  Goal: Patient's nutritional and fluid intake will be adequate or improve  Outcome: Met  Goal: Patient will display progressive weight gain toward goal have adequate food and fluid intake  Outcome: Met  Goal: Patient will demonstrate ability to administer respiratory medications  Outcome: Met     Problem: Self Care  Goal: Patient will have the ability to perform ADLs independently or with assistance  Outcome: Met     Problem: Mobility  Goal: Patient's capacity to carry out activities will  improve  Outcome: Met     Problem: Infection  Goal: Patient will remain free from infection  Outcome: Met     Problem: VTE Prevention  Goal: Patient will remain free from venous thromboembolism (VTE)  Outcome: Met     Problem: Skin Integrity  Goal: Skin integrity is maintained or improved  Outcome: Met

## 2023-09-05 NOTE — PROGRESS NOTES
NURSING DAILY NOTE    Name: Richard Hubbard II   Date of Admission: 8/20/2023   Admitting Diagnosis: Acute diastolic heart failure (HCC)  Attending Physician: Shaista Guerra D.o.  Allergies: Nsaids, Diphenhydramine, Mirtazapine, Penicillins, and Promethazine    Safety  Patient Assist  SBA  Patient Precautions  Fall Risk  Precaution Comments  prosthesis LLE  Bed Transfer Status  Supervised  Toilet Transfer Status   Modified Independent  Assistive Devices  Wheelchair  Oxygen  None - Room Air  Diet/Therapeutic Dining  Current Diet Order   Procedures    Diet Order Diet: Regular (For 8/23/23 breakfast, pt is requesting victor, eggs and pancakes.  Plus with all meals, he would like 2 cokes on his trays.  Thank you.)     Pill Administration  whole  Agitated Behavioral Scale     ABS Level of Severity       Fall Risk  Has the patient had a fall this admission?   No  Osiris Healy Fall Risk Scoring  21, HIGH RISK  Fall Risk Safety Measures  bed alarm and chair alarm, BKA    Vitals  Temperature: 36.6 °C (97.8 °F)  Temp src: Oral  Pulse: 74  Respiration: 17  Blood Pressure: 109/63  Blood Pressure MAP (Calculated): 78 MM HG  BP Location: Right, Upper Arm  Patient BP Position: Sitting     Oxygen  Pulse Oximetry: 96 %  O2 (LPM): 0  FiO2%: 21 %  O2 Delivery Device: None - Room Air    Bowel and Bladder  Last Bowel Movement  09/04/23  Stool Type  Type 4: Like a sausage or snake, smooth and soft  Bowel Device  Bathroom  Continent  Bladder: Continent void (Rivera catheter)   Bowel: Continent movement  Bladder Function  Urine Void (mL): 725 ml  Number of Times Voided:  (numerous per patient)  Urine Color: Yellow  Number of Times Incontinent of Urine: 0  Genitourinary Assessment   Bladder Assessment (WDL):  Within Defined Limits  Rivera Catheter: Not Applicable  Rivera Reasons per MD Order: Acute urinary retention or bladder outlet obstruction  Rivera Care: Given with Soap and  Water  Urinary Elimination: Catheter (Document on LDA)  Urine Color: Yellow  Number of Bladder Accidents: 0  Total Number of Bladder of Accidents in Last 7 Days: 0  Number of Times Incontinent of Urine: 0  Bladder Device: Urinal    Skin  Nam Score   17  Sensory Interventions   Skin Preventative Measures: Pillows in Use to Float Heels, Pillows in Use for Support / Positioning  Moisture Interventions  Moisturizers/Barriers: Barrier Wipes  Containment Devices: Indwelling Catheter      Pain  Pain Rating Scale  6 - Hard to ignore, avoid usual activities  Pain Location  Foot, Shoulder, Knee  Pain Location Orientation  Right  Pain Interventions   Medication (see MAR), Distraction    ADLs    Bathing   Shower, Self Care, No Assistance Required (SB A)  Linen Change   Partial  Personal Hygiene  Change [place] Pads  Chlorhexidine Bath      Oral Care  Brushed Teeth  Teeth/Dentures  Broken Teeth (Comments)  Shave  Self  Nutrition Percentage Eaten  *  * Meal *  *, Breakfast, Between % Consumed  Environmental Precautions  Treaded Slipper Socks on Patient, Personal Belongings, Wastebasket, Call Bell etc. in Easy Reach, Transferred to Stronger Side, Bed in Low Position  Patient Turns/Positioning  Patient Turns Self from Side to Side  Patient Turns Assistance/Tolerance  * * Assistance, Assistance of One  Bed Positions  Bed Controls On, Bed Locked  Head of Bed Elevated  Self regulated      Psychosocial/Neurologic Assessment  Psychosocial Assessment  Psychosocial (WDL):  Within Defined Limits  Patient Behaviors: Depressed, Drowsy  Neurologic Assessment  Neuro (WDL): Within Defined Limits  Level of Consciousness: Alert  Orientation Level: Oriented X4  Cognition: Follows commands  Speech: Clear  Pupil Assesment: Yes  R Pupil Size (mm): 3  R Pupil Shape / Description: Round  R Pupil Reaction: Brisk  L Pupil Size (mm): 3  L Pupil Shape / Description: Round  L Pupil Reaction: Brisk  Motor Function/Sensation Assessment: Sensation  RUE  Sensation: Full sensation  LUE Sensation: Full sensation  RLE Sensation: Full sensation  LLE Sensation: Full sensation  EENT (WDL):  WDL Except    Cardio/Pulmonary Assessment  Edema      Respiratory Breath Sounds  RUL Breath Sounds: Clear  RML Breath Sounds: Clear  RLL Breath Sounds: Clear  DONALD Breath Sounds: Clear  LLL Breath Sounds: Clear  Cardiac Assessment   Cardiac (WDL):  WDL Except (HF)

## 2023-09-05 NOTE — CARE PLAN
"The patient is Stable - Low risk of patient condition declining or worsening    Shift Goals  Clinical Goals: safety  Patient Goals: d/c home  Family Goals: Education    Progress made toward(s) clinical / shift goals:      Problem: Bladder / Voiding  Goal: Patient will establish and maintain regular urinary output  Outcome: Met  Note: Patient is continent of bladder, voiding via urinal with staff emptying.       Problem: Bowel Elimination  Goal: Patient will participate in bowel management program  Outcome: Met  Note: Patient is continent of bowel and reports having daily BMs     Patient is not progressing towards the following goals:    Problem: Pain - Standard  Goal: Alleviation of pain or a reduction in pain to the patient’s comfort goal  Outcome: Not Met  Note: Patient has chronically high pain levels     Problem: Fall Risk - Rehab  Goal: Patient will remain free from falls  Outcome: Not Met  Note: Osiris Healy Fall risk Assessment Score: 16    High fall risk Interventions   - Alarming seatbelt  - Bed and strip alarm   - Yellow sign by the door   - Yellow wrist band \"Fall risk\"  - Fall risk education provided     "

## 2023-09-06 NOTE — PROGRESS NOTES
1925 Pt's spouse and children in the facility to  pt, belongings with pt, together with medications. Pt left in stable condition.

## 2023-09-11 NOTE — TELEPHONE ENCOUNTER
Attempted to called patient in regards to his appointment with Dr Jackson needing to confirmed if patient has seen a prior cardiologists and go over some information in regards of the appointment. Patient stated that he needs to call me back and will call me back today to go over his appointment.

## 2023-09-11 NOTE — TELEPHONE ENCOUNTER
[facility] called regarding the heart rate for , he stated that Jennifer heart rate is 108/116 bpm manually and electronically. His other vitals are good, and patient denies any chest pain.

## 2023-09-11 NOTE — TELEPHONE ENCOUNTER
ADD    Caller: Richard Hubbard II     Topic/issue: Patient returning call to continue speaking with Amaya. Please advise. Prior cardiologist was Dr. Ivey in 2020.     Callback Number: [phone] (home)      Thank you,     Diane MESSINA

## 2023-09-12 NOTE — TELEPHONE ENCOUNTER
09/12/2023    Spoke with patient in regards to his appointment patient stated that he has not seen any cardiologists since the last time he saw . Patient aware of EKG and 6MWT.

## 2023-09-20 PROBLEM — I50.32 CHRONIC DIASTOLIC HEART FAILURE WITH PRESERVED EJECTION FRACTION (HCC): Status: ACTIVE | Noted: 2023-01-01

## 2023-09-20 NOTE — PROGRESS NOTES
"Subjective:     CC:     HPI:    presents today with    Visit schedule intended for Hospital follow-up visit    Patient was admitted 8/23/2023 to 9/5/2023 in rehab  \"past medical history of GERD, hypertension, severe rheumatoid arthritis previously on Humira, chronic pain history of BKA (completed inpatient rehab course3/21/2023 - 4/12/2023, depression and CHF who presented to [facility] on 8/12 with worsening shortness of breath\"    - Recent admission for CHF exacerbation with shortness of breath and pulmonary edema.  Underwent IV Lasix/diuresis.  Recent echo which showed LVEF of 55 with aortic and mitral regurg.  Patient was discharged with torsemide 40 mg daily. Back on furosemide 20mg daily.  Patient is on farxiga 10mg daily.    Chronic pain patient is on home morphine 30 mg 4 times daily, MS Contin 15 mg twice daily, naloxone.  Recently started on Lyrica 150mg 3 times daily    Overall patient report doing well. He was able to get his prescriptions. He report he will need refill sent to [place] and will notify office when refill is due.     AVANI, ASMA, IgG, AMA, Ceruloplasmin, Alpha-1 antitrypsin  AVANI: none detected, f-Actin AB, IgG 11 (0-19), igG 1514 ( [id number]), antimitochondrial AB 2.8 ( 0-24), ceruloplasmin 23 (15-30)    GI follow up for LFT??? - [facility]  - rpeort schedule   Pcp       Problem   Chronic Diastolic Heart Failure With Preserved Ejection Fraction (Hcc)   Right Foot Ulcer (Hcc)    History of bilateral foot wound, 2/2 nodules thought to be rheumatological vs structural. Previously referred to podiatry, however, he was referred back to orthopedics. Following currently following with [facility].   - s/p L BKA ( 2023)         Health Maintenance:     ROS:  ROS    Objective:     Exam:  /85   Pulse (!) 102   Ht 1.803 m (5' 11\")   Wt 95.3 kg (210 lb)   BMI 29.29 kg/m²  Body mass index is 29.29 kg/m².    Physical Exam      Labs: ***    Assessment & Plan:     48 y.o. male " with the following -     ***          No follow-ups on file.    Please note that this dictation was created using voice recognition software. I have made every reasonable attempt to correct obvious errors, but I expect that there are errors of grammar and possibly content that I did not discover before finalizing the note.

## 2023-09-20 NOTE — PROGRESS NOTES
"Virtual Visit: Established Patient   This visit was conducted via Zoom using secure and encrypted videoconferencing technology.   The patient was in their home in the state of [place].    The patient's identity was confirmed and verbal consent was obtained for this virtual visit.    Subjective:   CC:   Chief Complaint   Patient presents with    Follow-Up     Richard Hubbard II is a 48 y.o. male presenting for evaluation and management of:  ----------------  Visit schedule intended for Hospital follow-up visit  -----------------  Patient was admitted 8/23/2023 to 9/5/2023 in rehab  \"past medical history of GERD, hypertension, severe rheumatoid arthritis previously on Humira, chronic pain history of BKA (completed inpatient rehab course3/21/2023 - 4/12/2023, depression and CHF who presented to [facility] on 8/12 with worsening shortness of breath\"    - Recent admission for CHF exacerbation with shortness of breath and pulmonary edema.  Underwent IV Lasix/diuresis.  Recent echo which showed LVEF of 55 with aortic and mitral regurg.  Patient was discharged with torsemide 40 mg daily. Back on furosemide 20mg daily.  Patient is on farxiga 10mg daily.    Chronic pain patient is on home morphine 30 mg 4 times daily, MS Contin 15 mg twice daily, naloxone.  Recently started on Lyrica 150mg 3 times daily    ---------------------  Overall patient report doing well. He was able to get his prescriptions. He report he will need refill sent to [facility] and will notify office when refill is due.         GI follow up for LFT??? - [facility]  - rpeort schedule   Pcp         ROS at baseline      Current medicines (including changes today)  Current Outpatient Medications   Medication Sig Dispense Refill    ferrous sulfate 325 (65 Fe) MG tablet Take 325 mg by mouth 2 times a day.      docusate sodium (COLACE) 100 MG Cap Take 200 mg by mouth 2 times a day.      multivitamin Tab Take 1 Tablet by mouth every day.   "    dapagliflozin propanediol (FARXIGA) 10 MG Tab Take 1 Tablet by mouth every day for 90 days. 30 Tablet 2    DULoxetine (CYMBALTA) 60 MG Cap DR Particles delayed-release capsule Take 1 Capsule by mouth every day. 30 Capsule 2    morphine ER (MS CONTIN) 15 MG Tab CR tablet Take 1 Tablet by mouth 2 times a day for 16 days. 32 Tablet 0    morphine (MS IR) 30 MG tablet Take 1 Tablet by mouth 4 times a day for 16 days. 64 Tablet 0    potassium chloride SA (K-DUR) 10 MEQ Tab CR Take 1 Tablet by mouth every day for 90 days. 30 Each 2    pregabalin (LYRICA) 150 MG Cap Take 1 Capsule by mouth 3 times a day for 16 days. Indications: Neuropathic Pain 48 Capsule 0    vitamin D3 (CHOLECALCIFEROL) 1000 Unit (25 mcg) Tab Take 1 Tablet by mouth every day. 30 Tablet 2    acetaminophen (TYLENOL) 500 MG Tab Take 2 Tablets by mouth in the morning, at noon, and at bedtime. 180 Tablet 2    furosemide (LASIX) 20 MG Tab Take 1 Tablet by mouth every day. 30 Tablet 2    lidocaine (LIDODERM) 5 % Patch Place 2 Patches on the skin every 24 hours. 10 Patch 2    melatonin 3 MG Tab Take 1 Tablet by mouth at bedtime. 30 Tablet 2    omeprazole (PRILOSEC) 20 MG delayed-release capsule Take 1 Capsule by mouth 2 times a day. 60 Capsule 2    predniSONE (DELTASONE) 10 MG Tab Take 1 Tablet by mouth 2 times a day. 60 Tablet 2    zolpidem (AMBIEN) 5 MG Tab Take 1 Tablet by mouth at bedtime as needed for Sleep for up to 16 days. 16 Tablet 0    albuterol 108 (90 Base) MCG/ACT Aero Soln inhalation aerosol Inhale 2 Puffs every 6 hours as needed for Shortness of Breath. 8.5 g 0    Naloxone (NARCAN) 4 MG/0.1ML Liquid One spray in one nostril for overdose and call 911. 1 Package 0    MOVANTIK 25 MG Tab       Adalimumab (HUMIRA) 40 MG/0.4ML Prefilled Syringe Kit Inject 40 mg under the skin every 14 days.      Adalimumab (HUMIRA PEN) 40 MG/0.4ML Pen-injector Kit INJECT 40MG SUBCUTANEOUSLY  EVERY 2 WEEKS 2 Each 4     No current facility-administered medications for  "this visit.       Patient Active Problem List    Diagnosis Date Noted    Blood glucose elevated 09/04/2023    Cough 09/01/2023    Cardiac volume overload 08/29/2023    Anemia 08/24/2023    History of gastric bypass 08/21/2023    Chronic diastolic heart failure with preserved ejection fraction ([facility]) 08/21/2023    CHF (congestive heart failure), NYHA class I, acute on chronic, combined ([facility]) 08/20/2023    Hypokalemia 08/14/2023    Acute diastolic heart failure ([facility]) 08/12/2023    Cardiorenal syndrome 08/12/2023    Elevated liver function tests 08/12/2023    Mitral valve mass 08/12/2023    Left bundle branch block 08/12/2023    KATERYNA (acute kidney injury) ([facility]) 08/12/2023    Obesity (BMI 30-39.9) 05/31/2023    Umbilical hernia without obstruction and without gangrene 05/31/2023    Normocytic anemia 05/31/2023    S/P BKA (below knee amputation) unilateral, left ([facility]) 03/21/2023    Vitamin D deficiency 03/14/2023    Tenosynovitis of left lower leg 03/06/2023    Hyponatremia 02/28/2023    Generalized weakness 06/15/2022    Impaired mobility and ADLs 06/15/2022    Right foot ulcer ([facility]) 06/15/2022    Chronic use of opiate drug for therapeutic purpose 01/18/2021    Chronic pain syndrome 01/18/2021    Localized osteoporosis without current pathological fracture 10/19/2020    History of osteomyelitis 08/14/2020    History of immunosuppressive therapy 08/14/2020    History of atrial fibrillation ( single episode 2019) 07/23/2020    History of cigarette smoking 07/21/2020    Aortic root dilatation ([facility]) 09/13/2019    Aortic stenosis, moderate 09/12/2019    Rheumatoid arthritis ([facility]) 09/08/2015    Chronic pain of right knee 09/08/2015    History of bleeding ulcers 02/12/2015    MONICA (obstructive sleep apnea) 03/09/2010    Mood disorder ([facility]) 05/21/2009    ELISEO (generalized anxiety disorder) 05/21/2009        Objective:   /85   Pulse (!) 102   Ht 1.803 m (5' 11\")   Wt 95.3 kg (210 lb)   BMI 29.29 kg/m²     Physical " Exam  Constitutional: Alert, no distress, well-groomed.  Skin: No rashes in visible areas.  Eye: Round. Conjunctiva clear, lids normal. No icterus.   ENMT: Lips pink without lesions,  Neck: No masses, no thyromegaly. Moves freely without pain.  Respiratory: Unlabored respiratory effort, no cough or audible wheeze  Psych: Alert and oriented x3, normal affect and mood.   Speech fluent, face symmetric, not in resp distress, speak in full sentences.   Assessment and Plan:   The following treatment plan was discussed:     1. Elevated liver enzymes  Chronic, stable/ transient  Down trending at time of discharge  GI labs reviewed, AVANI, ASMA, IgG, AMA, Ceruloplasmin, Alpha-1 antitrypsin  AVANI: none detected, f-Actin AB, IgG 11 (0-19), igG 1514 ( 768-1632), antimitochondrial AB 2.8 ( 0-24), ceruloplasmin 23 (15-30)  Plan  Patient defer [facility] referral at this time  I did not find a1at order/ lab result     2. Chronic diastolic heart failure with preserved ejection fraction (HCC)  Chronic, stable  On farxiga 10mg and furosemide 20mg daily   Report has follow up with cardiology       Follow-up: Return in about 3 months (around 12/20/2023) for Lab review, Med check.

## 2023-09-21 NOTE — TELEPHONE ENCOUNTER
Caller Name: Meron   Call Back Number: [phone]    How would the patient prefer to be contacted with a response: Phone call OK to leave a detailed message    Nurse meron left a vm regarding a new wound that was found on  as of yesterday. She said it does not look to be infected, she is just giving an update on his findings.

## 2023-09-26 NOTE — TELEPHONE ENCOUNTER
Phone Number Called: [phone]    Call outcome: Left detailed message for patient. Informed to call back with any additional questions.    Message: Left message with patient and recommended that patient go to ER as he has not been seen in office for over three years and since he is experiencing shortness of breathe and a racing heart.

## 2023-09-26 NOTE — TELEPHONE ENCOUNTER
ADD    Caller: Richard Hubbard II     Topic/issue: Patient called and stated he is having swelling in both his legs which has been going on for a few days. He also stated he is having shortness of breath as well a racing heart and believes he is going in and out of AFIB. Per the patient he is not having any other symptoms. I did advise the patient to go to the ER if the symptoms worsen.     Please advise.    Callback Number: [phone] (home)      Thank you,     Diane MESSINA

## 2023-09-26 NOTE — TELEPHONE ENCOUNTER
Phone Number Called: [phone]    Call outcome: Spoke to patient regarding message below.    Message: Spoke to patient about current symptoms. Patient reports feeling lethargic constantly. Patient is wheelchair bound at this time r/t BKA. Patient reports that he is having difficulty catching his breathe. Patient reports swelling in legs and thighs. Patient reports he can hear wheezing. Patient is taking furosemide at this time but reports that in the past it has not helped. Patient reports no chest pain at this time. Patient reports he feels at times that he may be going in and out of afib. Patient current HR is 108 and /81. RN recommended patient go to ER based on symptoms at this time as we cannot adjust medications at this time since patient is not reestablished care in office. Advised to call back with any questions or concerns.

## 2023-10-10 PROBLEM — M79.671 RIGHT FOOT PAIN: Status: ACTIVE | Noted: 2023-01-01

## 2023-10-10 PROBLEM — M25.571 ACUTE RIGHT ANKLE PAIN: Status: ACTIVE | Noted: 2023-01-01

## 2023-10-18 PROBLEM — R65.21 SEPTIC SHOCK (HCC): Status: ACTIVE | Noted: 2023-01-01

## 2023-10-18 PROBLEM — A41.9 SEPTIC SHOCK (HCC): Status: ACTIVE | Noted: 2023-01-01

## 2023-10-19 PROBLEM — M21.6X1 ACQUIRED PLANOVALGUS DEFORMITY OF RIGHT FOOT: Status: ACTIVE | Noted: 2023-01-01

## 2023-10-19 PROBLEM — R01.1 MURMUR, CARDIAC: Status: ACTIVE | Noted: 2023-01-01

## 2023-10-19 PROBLEM — R40.0 SOMNOLENCE: Status: ACTIVE | Noted: 2023-01-01

## 2023-10-19 PROBLEM — G89.4 CHRONIC PAIN SYNDROME: Chronic | Status: ACTIVE | Noted: 2021-01-18

## 2023-10-19 NOTE — PROGRESS NOTES
Med Rec UPDATED and COMPLETE per PATIENT  Allergies Reviewed  Antibiotcs in past 30 days:NO  Anticoagulant in past 14 days:NO  Preferred pharmacy:[facility] IN [place]    Pt unsure of Lyrica strength or directions    [facility] currently closed for lunch opens back up @ 1400    Addendum: verified Lyrica with [facility] via [facility]

## 2023-10-19 NOTE — CARE PLAN
The patient is Watcher - Medium risk of patient condition declining or worsening    Shift Goals  Clinical Goals: MAP>65  Patient Goals: JI  Family Goals: JI    Progress made toward(s) clinical / shift goals:      Problem: Pain - Standard  Goal: Alleviation of pain or a reduction in pain to the patient’s comfort goal  Outcome: Progressing     Problem: Knowledge Deficit - Standard  Goal: Patient and family/care givers will demonstrate understanding of plan of care, disease process/condition, diagnostic tests and medications  Outcome: Progressing     Problem: Fall Risk  Goal: Patient will remain free from falls  Outcome: Progressing       Patient educated on plan of care, verbalizes understanding. Pain measured on verbal numeric pain scale, PRNs available. Q2 hour turns in place for skin integrity.       Patient is not progressing towards the following goals: N/A

## 2023-10-19 NOTE — ED NOTES
POC COVID/flu/RSV collected and in process   - Infectious work up has been unrevealing.   - Concern for SLE flare rather than TB   - d/w rheum --- Patient has been on 48 hours of IV solumedrol 30 iv bid with little improvement. Rheum would like to do pulse dose steroids at 1000mg IV daily for 3 days. Today will be day 2 of 3. Improvement in symptoms since yesterday. - Infectious work up has been unrevealing.   - 3 negative AFB smears. Isolation precautions removed.  quant (-)  - Concern for SLE flare rather than TB   - d/w rheum --- Patient has been on 48 hours of IV solumedrol 30 iv bid with little improvement. Rheum would like to do pulse dose steroids at 1000mg IV daily for 3 days. Today will be day 2 of 3. Improvement in symptoms since yesterday.

## 2023-10-19 NOTE — H&P
[facility] ICU H&P Note      Admit Date: 10/18/2023    Resident(s): Rasheed Dent M.D.   Attending:  RADHA MIRANDA/ Dr. Saravia    Patient ID:    Name:  Richard Hubbard   YOB: 1975  Age:  48 y.o.  male   MRN:  [id number]    Hospital Course (carried forward and updated):  Richard Hubbard II is a 48 y.o. male with past medical history significant for osteomyelitis status post left BKA (3/2023), alcohol abuse disorder, rheumatoid arthritis on immunotherapy, obstructive sleep apnea who was brought to the emergency department after he was confused and not answering questions.    Patient unable to provide significant history as he is quite somnolent however easily arousable.  Per chart review, patient's wife felt as if the patient was getting altered and having worsening pain and erythema of left lower extremity stump.  Patient recently had a BKA of his left lower extremity on March of this year.  Patient was also scheduled to have a right subtalar dislocation open reduction procedure on 10/25/2023.    In the ED, initial vitals of temperature of 102.2 Fahrenheit, respiratory rate 31, pulse of 110, blood pressure of 9455, and 85% on pulse oximetry.  Labs are significant for white blood cell count of 32.9, hemoglobin of 10.4, sodium 134, proBNP of over 32,000, albumin of 2.7. COVID/Flu/RSV negative.  CT abdomen pelvis demonstrated 4.6 cm mid abdominal hernia without obstructive changes.  CT left lower extremity demonstrated enhancing fluid collection and suspected changes for osteomyelitis.      Consultants:  Critical Care  Orthopedics     Past Medical History:   Diagnosis Date    ADD (attention deficit disorder)     Alcohol abuse     Allergic rhinitis 5/21/2009    Anemia 09/2019    Anxiety 5/21/2009    Apnea, sleep     Arrhythmia     afib when hospitaized for infection    Back pain 5/21/2009    Bipolar disorder (HCC)     Bleeding ulcer 5/21/2009    Bleeding ulcer 5/21/2009    History of anemia-now on  nexium     Bowel habit changes     constipation related to narcotics    Daytime sleepiness     Depression 5/21/2009    Eczema 5/21/2009    GERD (gastroesophageal reflux disease)     Heart burn     on meds    Heart murmur     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    Hemorrhagic disorder (HCC)     hx of bleeding ulcers    History of bleeding ulcers 2/12/2015    Hypertension 05/03/2021    pt states well controlled on meds    Insomnia 5/21/2009    Migraine 5/21/2009    Morning headache     Obesity, morbid (formerly [facility]) 5/21/2009    Osteomyelitis (formerly [facility]) 10/7/2019    Pain 09/2019    Chronic knee and back pain    Pubic ramus fracture (formerly [facility]) 9/28/2019    Rheumatoid arteritis (formerly [facility]) 09/2019    knee, feet right hand    Sleep apnea 5/21/2009    Did not tolerate cpap, does not use it    Snoring        Past Surgical History:   Procedure Laterality Date    KNEE AMPUTATION BELOW Left 3/9/2023    Procedure: AMPUTATION, BELOW KNEE;  Surgeon: Wayne Chambers M.D.;  Location: [facility];  Service: Orthopedics    PB TOTAL KNEE ARTHROPLASTY Right 7/22/2020    Procedure: ARTHROPLASTY, KNEE, TOTAL;  Surgeon: Temo Pires M.D.;  Location: [facility];  Service: Orthopedics    TENDON TRANSFER Right 1/22/2020    Procedure: RIGHT DISTAL ULNA RESECTION AND EXTENSOR TENDON TRANSFER;  Surgeon: Marine Rushing M.D.;  Location: [facility];  Service: Orthopedics    OTHER ORTHOPEDIC SURGERY Right 2020    total right knee replacement    IRRIGATION & DEBRIDEMENT ORTHO Left 10/9/2019    Procedure: IRRIGATION AND DEBRIDEMENT, WOUND - PELVIC OSTEOMYELITIS;  Surgeon: You Olivares M.D.;  Location: [facility];  Service: Orthopedics    OTHER SURGICAL PROCEDURE  2019    osteomelitis of pelvis cleaned    GASTRIC BYPASS LAPAROSCOPIC  2000    Lucila en y    CHOLECYSTECTOMY  2000    OTHER SURGICAL PROCEDURE      repair of broken penis       Social History     Socioeconomic History    Marital status:       Spouse name: Not on file    Number of children: Not on file    Years of education: Not on file    Highest education level: Bachelor's degree (e.g., BA, AB, BS)   Occupational History    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   Tobacco Use    Smoking status: Some Days     Current packs/day: 0.00     Types: Cigarettes, Cigars     Last attempt to quit: 2023     Years since quittin.7     Passive exposure: Current    Smokeless tobacco: Never    Tobacco comments:     occasional cigar   Vaping Use    Vaping Use: Never used   Substance and Sexual Activity    Alcohol use: No     Alcohol/week: 0.0 oz     Comment: quit  ago- past ETOH abuse    Drug use: No    Sexual activity: Yes     Partners: Female     Comment: ;    Other Topics Concern     Service No    Blood Transfusions Yes    Caffeine Concern No    Occupational Exposure No    Hobby Hazards No    Sleep Concern No    Stress Concern Yes    Weight Concern Yes    Special Diet No    Back Care No    Exercise No    Bike Helmet No    Seat Belt Yes    Self-Exams Yes   Social History Narrative    , lives in [place]     Social Determinants of Health     Financial Resource Strain: Low Risk  (2023)    Overall Financial Resource Strain (CARDIA)     Difficulty of Paying Living Expenses: Not very hard   Food Insecurity: No Food Insecurity (2023)    Hunger Vital Sign     Worried About Running Out of Food in the Last Year: Never true     Ran Out of Food in the Last Year: Never true   Transportation Needs: Unmet Transportation Needs (2023)    PRAPARE - Transportation     Lack of Transportation (Medical): Yes     Lack of Transportation (Non-Medical): Yes   Physical Activity: Sufficiently Active (2023)    Exercise Vital Sign     Days of Exercise per Week: 7 days     Minutes of Exercise per Session: 30 min   Stress: Stress Concern Present (2023)    [employer] - Occupational Stress Questionnaire     Feeling of Stress :  "Rather much   Social Connections: Socially Integrated (9/7/2023)    Social Connection and Isolation Panel [NHANES]     Frequency of Communication with Friends and Family: More than three times a week     Frequency of Social Gatherings with Friends and Family: Twice a week     Attends Faith Services: More than 4 times per year     Active Member of Clubs or Organizations: Yes     Attends Club or Organization Meetings: More than 4 times per year     Marital Status:    Intimate Partner Violence: Not At Risk (9/7/2023)    Humiliation, Afraid, Rape, and Kick questionnaire     Fear of Current or Ex-Partner: No     Emotionally Abused: No     Physically Abused: No     Sexually Abused: No   Housing Stability: Low Risk  (9/7/2023)    Housing Stability Vital Sign     Unable to Pay for Housing in the Last Year: No     Number of Places Lived in the Last Year: 1     Unstable Housing in the Last Year: No         Vitals Range last 24h:  Temp:  [37.9 °C (100.3 °F)-39 °C (102.2 °F)] 37.9 °C (100.3 °F)  Pulse:  [] 88  Resp:  [12-45] 25  BP: ()/(48-59) 87/53  SpO2:  [85 %-100 %] 96 %      Intake/Output Summary (Last 24 hours) at 10/19/2023 0128  Last data filed at 10/19/2023 0045  Gross per 24 hour   Intake 2833.34 ml   Output --   Net 2833.34 ml        Review of Systems   Unable to perform ROS: Acuity of condition       PHYSICAL EXAM:  Vitals:    10/19/23 0030 10/19/23 0045 10/19/23 0100 10/19/23 0115   BP: 94/50 95/53 (!) 85/55 (!) 87/53   Pulse: 91 91 89 88   Resp: 12 12 13 (!) 25   Temp:       TempSrc:       SpO2: 96% 94% 93% 96%   Weight:  104 kg (229 lb 15 oz)     Height:  1.778 m (5' 10\")      Body mass index is 32.99 kg/m².    O2 therapy: Pulse Oximetry: 96 %, O2 (LPM): 10, O2 Delivery Device: Oxymask           Physical Exam  Vitals and nursing note reviewed.   HENT:      Head: Normocephalic and atraumatic.      Right Ear: External ear normal.      Left Ear: External ear normal.      Nose: No rhinorrhea. "      Mouth/Throat:      Mouth: Mucous membranes are moist.   Eyes:      Extraocular Movements: Extraocular movements intact.      Conjunctiva/sclera: Conjunctivae normal.   Cardiovascular:      Rate and Rhythm: Regular rhythm. Tachycardia present.      Heart sounds: Murmur heard.      No gallop.      Comments: 4/6 systolic murmur  Pulmonary:      Effort: Pulmonary effort is normal.      Breath sounds: Rhonchi present. No wheezing.   Abdominal:      General: Bowel sounds are normal. There is no distension.      Tenderness: There is no abdominal tenderness.      Comments: Ventral hernia, reducible   Musculoskeletal:         General: Swelling, tenderness and deformity present.      Cervical back: Normal range of motion and neck supple.      Right lower leg: No edema.      Left lower leg: No edema.      Comments: LLE erythema and warm to tough    RLE has a healing wound on palmar aspect   Skin:     General: Skin is warm and dry.      Capillary Refill: Capillary refill takes 2 to 3 seconds.      Coloration: Skin is not jaundiced.         Recent Labs     10/19/23  0011   [id number] 7.37*   [id number] 44.2*   [id number] 96.7*   [id number] 96.2   ARTHCO3 25   [id number] 10L   ARTBE 0     Recent Labs     10/18/23  2050 10/18/23  2326   SODIUM 138 134*   POTASSIUM 4.2 3.9   CHLORIDE 100 100   CO2 27 26   BUN 17 19   CREATININE 0.96 0.93   CALCIUM 8.2* 7.5*     Recent Labs     10/18/23  2050 10/18/23  2326   ALTSGPT 8 12   ASTSGOT 10* 23   ALKPHOSPHAT 119* 115*   TBILIRUBIN 0.6 0.5   GLUCOSE 73 79     Recent Labs     10/18/23  2050 10/18/23  2326   RBC 4.01* 3.65*   HEMOGLOBIN 11.4* 10.4*   HEMATOCRIT 36.8* 33.2*   PLATELETCT 265 240     Recent Labs     10/18/23  2050 10/18/23  2326   WBC 29.7* 32.9*   NEUTSPOLYS 86.60* 85.50*   LYMPHOCYTES 8.50* 8.60*   MONOCYTES 3.70 3.90   EOSINOPHILS 0.00 0.20   BASOPHILS 0.10 0.20   ASTSGOT 10* 23   ALTSGPT 8 12   ALKPHOSPHAT 119* 115*   TBILIRUBIN 0.6 0.5       Meds:   omeprazole  20 mg       NORepinephrine  0-1 mcg/kg/min (Ideal) 0.1 mcg/kg/min (10/18/23 9176)    linezolid (ZYVOX) IV  600 mg 600 mg (10/19/23 0039)    piperacillin-tazobactam  3.375 g          Procedures:  N/a    Imaging:  DX-CHEST-PORTABLE (1 VIEW)   Final Result         1.  Pulmonary edema and/or infiltrates are identified, which appear somewhat increased since the prior exam.   2.  Cardiomegaly      CT-EXTREMITY, LOWER WITH LEFT   Final Result         1.  Enhancing fluid collection at the distal soft tissues at below the knee amputation, appearance most compatible with abscess. Appears to communicate with the skin surface.   2.  Suspected abscess abuts the distal tibial stump concerning for changes of osteomyelitis.   3.  Diffuse subcutaneous fat stranding, compatible changes of edema and/or cellulitis.      CT-ABDOMEN-PELVIS WITH   Final Result         1.  4.6 cm mid abdominal hernia containing a portion of transverse without visualized obstructive changes   2.  Bilateral pulmonary infiltrates.   3.  Moderate right and trace left pleural effusion   4.  Diffuse subcutaneous fat stranding, appearance favoring changes of anasarca   5.  Atherosclerosis and atherosclerotic coronary artery disease      DX-TIBIA AND FIBULA LEFT   Final Result         1.  No acute traumatic bony injury.   2.  Soft tissue gas at the distal amputation site anteriorly, appearance suggests open wound and/or underlying infectious etiology. Correlate with exam.      DX-CHEST-PORTABLE (1 VIEW)   Final Result         1.  Pulmonary edema and/or infiltrates.   2.  Trace right pleural effusion   3.  Cardiomegaly          ASSESSEMENT and PLAN:    * Septic shock (HCC)- (present on admission)  Assessment & Plan  This is Sepsis Present on admission  SIRS criteria identified on my evaluation include: Fever, with temperature greater than 100.9 deg F and Leukocytosis, with WBC greater than 12,000  Clinical indicators of end organ dysfunction include Hypotension with systolic  blood pressure less than 90 or MAP less than 65 and Toxic Metabolic Encephalopathy  Source is likely osteomyelitis  Sepsis protocol initiated  Crystalloid Fluid Administration: Fluid resuscitation ordered per standard protocol - 30 mL/kg per current or ideal body weight  IV antibiotics as appropriate for source of sepsis  Reassessment: I have reassessed the patient's hemodynamic status    Orthopedics consulted in the ED, surgery plan per Orthopedics  Blood cultures collected  Received IVF in the ED  Patient received vancomycin. Continue Linezolid and Zosyn    Murmur, cardiac  Assessment & Plan  Suspect likely due to known aortic stenosis  However if early blood cultures, possible evaluation for endocarditis    Somnolence  Assessment & Plan  Alcohol, ammonia, TSH, UDS WNL  ABG pending    Rheumatoid arthritis ([facility])- (present on admission)  Assessment & Plan  On Humira and intermittent prednisone  Will hold off on restarting prednisone for now, cortisol level pending  If hemodynamics worsen, will consider stress dose steroids    Chronic diastolic heart failure with preserved ejection fraction (HCC)- (present on admission)  Assessment & Plan  Careful with IVF  Hold home Lasix as patient is in septic shock, anticipate restart of heart failure medications when able    Normocytic anemia- (present on admission)  Assessment & Plan  Chronic, baseline Hb 9-11  Continue to monitor for acute blood loss    Chronic pain syndrome- (present on admission)  Assessment & Plan  Limit usage of narcotic pain medications  Multimodality pain management as needed    History of cigarette smoking- (present on admission)  Assessment & Plan  Patient reports intermittent tobacco usage, last 1-2 weeks ago  Declined NRT at this time  Further counseling on discharge    MONICA (obstructive sleep apnea)- (present on admission)  Assessment & Plan  Limit sedating medications  ABG pending    Mood disorder ([facility])- (present on admission)  Assessment &  Plan  Holding home duloxetine as patient is on linezolid to minimize serotonin syndrome  Anticipate restart after discharge        DISPO: Admit to ICU    CODE STATUS: Full    Quality Measures:  Feeding: N/a  Analgesia: N/a  Sedation: N/a  Thromboprophylaxis: SCDs on RLE, holding chemical for surgery in AM  Head of bed: >30 degrees  Ulcer prophylaxis: PPI  Glycemic control: N/a  Bowel care: bowel regimen  Indwelling lines: PIV  Deescalation of antibiotics: N/a      Rasheed Dent M.D.

## 2023-10-19 NOTE — HOSPITAL COURSE
Richard Hubbard is a 48 year old male with PMH significant for ADD, RA on immunotherapy, MONICA, gastric bypass, ELISEO, ETOH abuse, GERD, CHF with moderate AS and MR, pulmonary HTN, left BKA, chronic pain on chronic opioid therapy and planovalgus of the right foot who presented 10/18/23 with AMS and fevers. He was seen in the OP setting 10/10 to discuss options for his acquired right foot planovalgus with plan for surgical intervention 10/25.  In the ED, he had a fever 102.2 and his LLE BKA stump was noted to be hot and tender. CT scan showed enhancing fluid collection at the distal soft tissues below the knee amputation consistent with abscess. He was treated with sepsis protocol and admitted to ICU.

## 2023-10-19 NOTE — ASSESSMENT & PLAN NOTE
This is Sepsis Present on admission  SIRS criteria identified on my evaluation include: Fever, with temperature greater than 100.9 deg F, Tachycardia, with heart rate greater than 90 BPM and Leukocytosis, with WBC greater than 12,000  Clinical indicators of end organ dysfunction include Hypotension with systolic blood pressure less than 90 or MAP less than 65, Lactic Acid greater than 2 and Toxic Metabolic Encephalopathy  Source is LLE BKA stump abscess  Sepsis protocol initiated  Crystalloid Fluid Administration: Fluid resuscitation ordered per standard protocol - 30 mL/kg per current or ideal body weight  IV antibiotics as appropriate for source of sepsis  Reassessment: I have reassessed the patient's hemodynamic status.  Vasopressors weaned off today   Blood cultures positive MRSA/MSSA.  Repeating.  ID consulted.  Will see today  Weaning steroids

## 2023-10-19 NOTE — ANESTHESIA PREPROCEDURE EVALUATION
Case: [id number] Date/Time: 10/19/23 0830    Procedure: IRRIGATION AND DEBRIDEMENT, WOUND (Left: Knee)    Location: [facility] / SURGERY [facility]    Surgeons: Jay Roe M.D.          Relevant Problems   ANESTHESIA   (positive) MONICA (obstructive sleep apnea)      CARDIAC   (positive) Aortic root dilatation (HCC)   (positive) Aortic stenosis, moderate   (positive) Left bundle branch block   (positive) Murmur, cardiac         (positive) KATERYNA (acute kidney injury) (HCC)   (positive) Cardiorenal syndrome      Other   (positive) Rheumatoid arthritis (HCC)       Physical Exam    Airway   Mallampati: II  TM distance: >3 FB  Neck ROM: full       Cardiovascular - normal exam  Rhythm: regular  Rate: normal  (-) murmur     Dental - normal exam           Pulmonary - normal exam  Breath sounds clear to auscultation     Abdominal    Neurological - normal exam                 Anesthesia Plan    ASA 4- EMERGENT                       Informed Consent:

## 2023-10-19 NOTE — ANESTHESIA POSTPROCEDURE EVALUATION
Patient: Richard Hubbard II    Procedure Summary     Date: 10/19/23 Room / Location: [place] [id number] / SURGERY [facility]    Anesthesia Start: 0931 Anesthesia Stop: 1009    Procedure: IRRIGATION AND DEBRIDEMENT, WOUND (Left: Knee) Diagnosis:     Surgeons: Jay Roe M.D. Responsible Provider:     Anesthesia Type: Not recorded ASA Status: 4 - Emergent          Final Anesthesia Type: No value filed.  Last vitals  BP   Blood Pressure: 99/55    Temp   35.9 °C (96.7 °F)    Pulse   79   Resp   18    SpO2   99 %      Anesthesia Post Evaluation    Patient location during evaluation: PACU  Patient participation: complete - patient participated  Level of consciousness: awake and alert  Pain score: 2    Airway patency: patent  Anesthetic complications: no  Cardiovascular status: hemodynamically stable  Respiratory status: acceptable  Hydration status: euvolemic    PONV: none          No notable events documented.     Nurse Pain Score: 2 (NPRS)

## 2023-10-19 NOTE — PROGRESS NOTES
Critical Care Progress Note    Date of admission  10/18/2023    Chief Complaint  Altered Mental Status    Hospital Course  Richard Hubbard is a 48 year old male with PMH significant for ADD, RA on immunotherapy, MONICA, gastric bypass, ELISEO, ETOH abuse, GERD, CHF with moderate AS and MR, pulmonary HTN, left BKA, chronic pain on chronic opioid therapy and planovalgus of the right foot who presented 10/18/23 with AMS and fevers. He was seen in the OP setting 10/10 to discuss options for his acquired right foot planovalgus with plan for surgical intervention 10/25.  In the ED, he had a fever 102.2 and his LLE BKA stump was noted to be hot and tender. CT scan showed enhancing fluid collection at the distal soft tissues below the knee amputation consistent with abscess. He was treated with sepsis protocol and admitted to ICU.    Interval Problem Update  Reviewed last 24 hour events:  WBC 30 - 33 - 35 - 36. Lactic peak 2.1, down to 0.6 - stop trending  To OR this morning with Orthopedics - I&D  Tmax 99.9  SR 80's  SBP's 90's - Levo @ 0.12  RASS -1, no sedation  A/O x 4, following  NPO for OR - advance diet post-op as tolerated. BM PTA  Rivera, RIJ, PIV's  I/O: 3300/2350  Zosyn and Linezolid. Home PPI. Blood and urine cultures in process. Steroids  Replacing electrolytes  Mobility 2    Review of Systems  Review of Systems   Constitutional:  Negative for chills and fever.   HENT:  Positive for sore throat (after surgery).    Eyes:  Negative for blurred vision and double vision.   Respiratory:  Negative for cough and shortness of breath.    Gastrointestinal:  Negative for nausea and vomiting.   Musculoskeletal:  Positive for joint pain. Negative for falls.   Neurological:  Negative for dizziness, speech change and headaches.   Psychiatric/Behavioral:  Negative for hallucinations. The patient has insomnia. The patient is not nervous/anxious.    All other systems reviewed and are negative.       Vital Signs for last 24 hours    Temp:  [35.9 °C (96.7 °F)-39 °C (102.2 °F)] 36.6 °C (97.9 °F)  Pulse:  [] 84  Resp:  [11-45] 11  BP: ()/(48-62) 109/56  SpO2:  [85 %-100 %] 97 %    Hemodynamic parameters for last 24 hours       Respiratory Information for the last 24 hours       Physical Exam   Physical Exam  Vitals and nursing note reviewed.   Constitutional:       General: He is not in acute distress.     Appearance: Normal appearance. He is ill-appearing. He is not toxic-appearing.   HENT:      Head: Normocephalic.      Right Ear: Hearing normal.      Left Ear: Hearing normal.      Nose: Nose normal.      Mouth/Throat:      Lips: Pink.      Mouth: Mucous membranes are moist.   Cardiovascular:      Rate and Rhythm: Normal rate and regular rhythm.      Pulses: Normal pulses.      Heart sounds: Normal heart sounds.   Pulmonary:      Effort: Pulmonary effort is normal.      Breath sounds: Normal breath sounds. No wheezing or rhonchi.   Abdominal:      General: Bowel sounds are normal.      Palpations: Abdomen is soft.      Tenderness: There is no abdominal tenderness. There is no guarding or rebound.   Musculoskeletal:      Right lower leg: No edema.      Left lower leg: No edema.      Right foot: Deformity and Charcot foot present.      Comments: Left BKA      Left Lower Extremity: Left leg is amputated below knee.   Skin:     General: Skin is warm and dry.      Capillary Refill: Capillary refill takes less than 2 seconds.   Neurological:      Mental Status: He is alert.      GCS: GCS eye subscore is 4. GCS verbal subscore is 5. GCS motor subscore is 6.      Motor: Motor function is intact.   Psychiatric:         Mood and Affect: Mood normal.         Behavior: Behavior is cooperative.         Cognition and Memory: Cognition normal.         Judgment: Judgment normal.         Medications  Current Facility-Administered Medications   Medication Dose Route Frequency Provider Last Rate Last Admin    omeprazole (PriLOSEC) capsule 20 mg  20  mg Oral BID Rasheed Dent M.D.   20 mg at 10/19/23 0522    hydrocortisone sodium succinate PF (Solu-CORTEF) 100 MG injection 50 mg  50 mg Intravenous Q6HRS Maxi Saravia M.D.   50 mg at 10/19/23 1147    senna-docusate (Pericolace Or Senokot S) 8.6-50 MG per tablet 2 Tablet  2 Tablet Oral BID Estefanía Muñozer, A.P.R.N.        And    polyethylene glycol/lytes (Miralax) PACKET 1 Packet  1 Packet Oral QDAY PRN Estefanía Cuevas, A.P.R.N.        And    magnesium hydroxide (Milk Of Magnesia) suspension 30 mL  30 mL Oral QDAY PRN Estefanía Muñozer, A.P.R.N.        And    bisacodyl (Dulcolax) suppository 10 mg  10 mg Rectal QDAY PRN Estefanía Muñozer, A.P.R.N.        acetaminophen (Tylenol) tablet 650 mg  650 mg Oral Q4HRS PRN Estefanía Cuevas, A.P.R.N.        HYDROmorphone (Dilaudid) injection 1 mg  1 mg Intravenous Q2HRS PRN Estefanía Cuevas, A.P.R.N.   1 mg at 10/19/23 1143    MD Alert...ICU Electrolyte Replacement per Pharmacy   Other PHARMACY TO DOSE Estefanía Muñozer, A.P.R.N.        potassium chloride SA (Kdur) tablet 40 mEq  40 mEq Oral Once Estefanía Muñozer, A.P.R.N.        morphine ER (Ms Contin) tablet 15 mg  15 mg Oral Q12HRS Estefanía Cuevas, A.P.R.N.   15 mg at 10/19/23 1212    morphine (MS IR) tablet 30 mg  30 mg Oral Q6HRS PRN Estefanía Cuevas, A.P.R.N.   30 mg at 10/19/23 1308    norepinephrine (Levophed) 8 mg in 250 mL NS infusion (premix)  0-1 mcg/kg/min (Ideal) Intravenous Continuous Temo Bernstein M.D. 16.9 mL/hr at 10/19/23 1202 0.12 mcg/kg/min at 10/19/23 1202    Linezolid (Zyvox) premix 600 mg  600 mg Intravenous BID Maxi Saravia M.D.   Stopped at 10/19/23 0628    piperacillin-tazobactam (Zosyn) 3.375 g in  mL IVPB  3.375 g Intravenous Q8HRS Maxi Saravia M.D. 25 mL/hr at 10/19/23 1159 3.375 g at 10/19/23 1159       Fluids    Intake/Output Summary (Last 24 hours) at 10/19/2023 1333  Last data filed at 10/19/2023 1200  Gross per 24 hour   Intake 3727.96 ml   Output 4875 ml   Net -1147.04 ml        Laboratory  Recent Labs     10/19/23  0011   IIVZK70H 7.37*   AZLRGQ500X 44.2*   PYEPB247T 96.7*   YRRJ0JOQ 96.2   ARTHCO3 25   T6AMBGNNK 10L   ARTBE 0         Recent Labs     10/18/23  2050 10/18/23  2326 10/19/23  0130   SODIUM 138 134*  --    POTASSIUM 4.2 3.9  --    CHLORIDE 100 100  --    CO2 27 26  --    BUN 17 19  --    CREATININE 0.96 0.93  --    MAGNESIUM  --   --  1.7   PHOSPHORUS  --   --  2.7   CALCIUM 8.2* 7.5*  --      Recent Labs     10/18/23  2050 10/18/23  2326   ALTSGPT 8 12   ASTSGOT 10* 23   ALKPHOSPHAT 119* 115*   TBILIRUBIN 0.6 0.5   GLUCOSE 73 79     Recent Labs     10/18/23  2050 10/18/23  2326 10/19/23  0130 10/19/23  0308   WBC 29.7* 32.9* 34.9* 36.2*   NEUTSPOLYS 86.60* 85.50* 86.50* 86.40*   LYMPHOCYTES 8.50* 8.60* 7.80* 7.80*   MONOCYTES 3.70 3.90 3.60 3.80   EOSINOPHILS 0.00 0.20 0.00 0.00   BASOPHILS 0.10 0.20 0.20 0.20   ASTSGOT 10* 23  --   --    ALTSGPT 8 12  --   --    ALKPHOSPHAT 119* 115*  --   --    TBILIRUBIN 0.6 0.5  --   --      Recent Labs     10/18/23  2326 10/19/23  0130 10/19/23  0308   RBC 3.65* 3.65* 3.77*   HEMOGLOBIN 10.4* 10.4* 10.7*   HEMATOCRIT 33.2* 33.2* 34.9*   PLATELETCT 240 249 258   PROTHROMBTM  --  15.9*  --    INR  --  1.25*  --        Imaging  CT:    Reviewed    Assessment/Plan  * Septic shock (HCC)- (present on admission)  Assessment & Plan  This is Sepsis Present on admission  SIRS criteria identified on my evaluation include: Fever, with temperature greater than 100.9 deg F, Tachycardia, with heart rate greater than 90 BPM and Leukocytosis, with WBC greater than 12,000  Clinical indicators of end organ dysfunction include Hypotension with systolic blood pressure less than 90 or MAP less than 65, Lactic Acid greater than 2 and Toxic Metabolic Encephalopathy  Source is LLE BKA stump abscess  Sepsis protocol initiated  Crystalloid Fluid Administration: Fluid resuscitation ordered per standard protocol - 30 mL/kg per current or ideal body weight  IV  "antibiotics as appropriate for source of sepsis  Reassessment: I have reassessed the patient's hemodynamic status. Patient has not required vasopressor support    Acquired planovalgus deformity of right foot- (present on admission)  Assessment & Plan  -plan for OR 10/15    Chronic diastolic heart failure with preserved ejection fraction (HCC)- (present on admission)  Assessment & Plan  -most recent ECHO EF 55%  -holding off on ACE/ARB/BB while on pressors for hypotension  -Lasix as tolerated  -daily weight  -strict I&O    Chronic pain syndrome- (present on admission)  Assessment & Plan  -see \"chronic, continuous use of opioids\"    Chronic, continuous use of opioids- (present on admission)  Assessment & Plan  -PDMP reviewed. He receives 120 (30mg) MS IR/month and 60 (15mg) MS ER/month - we will restart this post-op  -PRN IV Dilaudid for severe break through   -bowel protocol      Rheumatoid arthritis (HCC)- (present on admission)  Assessment & Plan  -history of - on Humira as OP    MONICA (obstructive sleep apnea)- (present on admission)  Assessment & Plan  -history of  -has failed to FU OP for sleep study    Insomnia  Assessment & Plan  -on Ambien as OP    Mood disorder (HCC)- (present on admission)  Assessment & Plan  -on Cymbalta as OP         VTE:  Contraindicated  Ulcer: Not Indicated  Lines: Rivera Catheter  Ongoing indication addressed    I have performed a physical exam and reviewed and updated ROS and Plan today (10/19/2023). In review of yesterday's note (10/18/2023), there are no changes except as documented above.     Discussed patient condition and risk of morbidity and/or mortality with RN, RT, Pharmacy, Dietary, , Patient, and my attending Dr. Gonzalez.  The patient remains critically ill.  Critical care time = 54 minutes in directly providing and coordinating critical care and extensive data review.  No time overlap and excludes procedures.    Please note that this dictation was created using " voice recognition software. I have made every reasonable attempt to correct obvious errors, but there may be errors of grammar and possibly content that I did not discover before finalizing the note.    JOSIANE Foss.

## 2023-10-19 NOTE — ANESTHESIA TIME REPORT
Anesthesia Start and Stop Event Times     Date Time Event    10/19/2023 0825 Ready for Procedure     0931 Anesthesia Start     1009 Anesthesia Stop        Responsible Staff    No responsible staff documented.       Overtime Reason:  no overtime (within assigned shift)    Comments:

## 2023-10-19 NOTE — PROGRESS NOTES
Reviewed imaging and clinical images.  Plan for resuscitation/broad spectrum ABX per critical care and operative debridement of his LLE infection in AM.

## 2023-10-19 NOTE — CONSULTS
Critical Care Medicine Faculty Consult Note    Consulted by: Dr Temo Bernstein    Reason for consult: Severe sepsis from left stump infection/abscess with acute encephalopathy    HPI: 48y M hx of ADD, General anxiety disorder, alcohol abuse, chronic pain, MONICA, gastric bypass, RA on immunotherapy, MRSA, ventral hernia, GERD, left BKA 3/2023, CHF with Mod AS, Mod MR, pulmonary hypertension RVSP 50, EF 55% and hx of Afib. He has been followed by orthopedics with plan OR 10/25. He presented by EMS after his wife noticed he was AMS. He had 102.2 fever in ER was given sepsis bolus of fluids and required high levels of oxygen with a concerning left lower leg wound at his stump was hot and tender. He underwent ct scan showing abscess, cellulitis and stranding. Orthopedics has been consulted. History is limited by patient due to his acute mental status change but he does awake move all extremities. His wife is at home taking care of there children and is not at bedside. He will be admitted as full code as he has been in the past.     Exam:Ill appearing sleeping male in red 1 with tachypnea on oxy mask, dry mucous membranes, no meningismus, lungs with tachypnea no retractions, heart with systolic murmur consistent with AS, abdomen with ventral hernia which is soft and easily reducibable, ext with demarcate chronic appearing wound to left lateral stump warm to touch on spot of pus but unable to express any fluid, neurologically he does awaken and follow commands and move extremities and speech clearly but sleepy.      A/P:  Severe sepsis from left lower ext stump infection and abscess and likely osteo. Orthopedic consultation, s/p vanco, continue linezolid and zosyn. If blood cx + recommend repeat echo to rule out endocarditis with + murmur and restarting vancomycin. Follow up cultures and wound data. Carefull volume expansion with heart failure history and use pressors with norepinephrine with map >65. Check viral swab. Check  cortisol level with hx of chronic steroids and if develops shock would start stress dose steroids.     Hx of CHF (Mod AS, Mod MR, Pulmonary HTN, EF 55%)  Careful with volume expansion. Restart heart failure regime when stable. Monitor need for force diuresis or Bipap.     Sepsis encephalopathy:  Non focal on exam, check VBG/ABG, ammonia, alcohol, TSH and UDS. Serial monitor need for intubation and provide aspiration precautions.     Leukocytosis: Due to sepsis, serial monitor.     Chronic pain: judicious use of narcotics and multimodal pain regime.     MONICA: avoid central acting drugs, use of cpap, check abg/vbg to rule out CO2 narcosis.     Patient remains in critical condition from severe sepsis and encephalopathy . Critical care time provided was 78 minutes. This excludes all separate billable procedures.     Please see [employer]/NP notes for additional documentation    Maxi Saravia MD  Critical Care Medicine

## 2023-10-19 NOTE — ED NOTES
Pt remains lethargic but responsive to voice.    Media of bilat foot/stump wound uploaded.    Pt titrated down to 2LPM via NC

## 2023-10-19 NOTE — PROGRESS NOTES
4 Eyes Skin Assessment Completed by FAITH Oliva and FAITH Gautam.    Head Blanching and Redness  Ears WDL  Nose WDL  Mouth WDL  Neck RIJ CVC  Breast/Chest WDL  Shoulder Blades WDL  Spine Redness right flank/hip  (R) Arm/Elbow/Hand Redness, Blanching, Bruising, and Abrasion  (L) Arm/Elbow/Hand Bruising and Abrasion  Abdomen Hernia  Groin Redness and Blanching  Scrotum/Coccyx/Buttocks Redness and Blanching  (R) Leg Redness and Blanching Thigh  (L) Leg Redness, Blanching, and Rash  (R) Heel/Foot/Toe Packed wound, boggy heel, redness great toe nailbed  (L) Heel/Foot/Toe Amputation, stump- red, open, hot blanching, purulent discharge          Devices In Places ECG, Blood Pressure Cuff, Pulse Ox, SCD's, and Central Line      Interventions In Place Q2 Turns, Low Air Loss Mattress, and Heels Loaded W/Pillows    Possible Skin Injury Yes    Pictures Uploaded Into Epic Yes  Wound Consult Placed Yes  RN Wound Prevention Protocol Ordered No

## 2023-10-19 NOTE — CARE PLAN
The patient is Stable - Low risk of patient condition declining or worsening    Shift Goals  Clinical Goals: MAP >65/SBP >90, monitor surgical site,  Patient Goals: JI  Family Goals: JI    Progress made toward(s) clinical / shift goals:      Problem: Pain - Standard  Goal: Alleviation of pain or a reduction in pain to the patient’s comfort goal  Outcome: Progressing       Problem: Respiratory  Goal: Patient will achieve/maintain optimum respiratory ventilation and gas exchange  Outcome: Progressing     Problem: Fall Risk  Goal: Patient will remain free from falls  Outcome: Progressing       Patient is not progressing towards the following goals:

## 2023-10-19 NOTE — ED NOTES
Blood and 2x set cultures collected and sent to lab.     2nd PIV initiated with US guidance by assist RN.    Pt straight cath'd for UA collected.    Pt originally on 10LPM via oxymask, titrated down to 6LPM at this time    Pt medicated and fluids administered per MAR.

## 2023-10-19 NOTE — ASSESSMENT & PLAN NOTE
Suspect likely due to known aortic stenosis  However if early blood cultures, possible evaluation for endocarditis

## 2023-10-19 NOTE — CONSULTS
10/19/2023    Time Called: 11:35pm  Time Arrived: 6am      HPI: Richard Hubbard II is a 48 y.o. 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  Chronic complains of pain at his left BKA site.  He says this has been worsening over the past 2 days.  Yesterday he began feeling very sick so presented to the emergency department.    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    Hemorrhagic disorder (HCC)     hx of bleeding ulcers    History of bleeding ulcers 2/12/2015    Hypertension 05/03/2021    pt states well controlled on meds    Insomnia 5/21/2009    Migraine 5/21/2009    Morning headache     Obesity, morbid (HCC) 5/21/2009    Osteomyelitis (Formerly [facility]) 10/7/2019    Pain 09/2019    Chronic knee and back pain    Pubic ramus fracture (Formerly [facility]) 9/28/2019    Rheumatoid arteritis (Formerly [facility]) 09/2019    knee, feet right hand    Sleep apnea 5/21/2009    Did not tolerate cpap, does not use it    Snoring        Past Surgical History:   Procedure Laterality Date    KNEE AMPUTATION BELOW Left 3/9/2023    Procedure: AMPUTATION, BELOW KNEE;  Surgeon: Wayne Chambers M.D.;  Location: SURGERY [facility];  Service: Orthopedics    PB TOTAL KNEE ARTHROPLASTY Right 7/22/2020    Procedure: ARTHROPLASTY, KNEE, TOTAL;  Surgeon: Temo Pires M.D.;  Location: SURGERY [facility];  Service: Orthopedics    TENDON TRANSFER Right 1/22/2020     Procedure: RIGHT DISTAL ULNA RESECTION AND EXTENSOR TENDON TRANSFER;  Surgeon: Marine Rushing M.D.;  Location: SURGERY [facility];  Service: Orthopedics    OTHER ORTHOPEDIC SURGERY Right 2020    total right knee replacement    IRRIGATION & DEBRIDEMENT ORTHO Left 10/9/2019    Procedure: IRRIGATION AND DEBRIDEMENT, WOUND - PELVIC OSTEOMYELITIS;  Surgeon: You Olivares M.D.;  Location: SURGERY [facility];  Service: Orthopedics    OTHER SURGICAL PROCEDURE  2019    osteomelitis of pelvis cleaned    GASTRIC BYPASS LAPAROSCOPIC  2000    Lucila en y    CHOLECYSTECTOMY  2000    OTHER SURGICAL PROCEDURE      repair of broken penis       Medications  No current facility-administered medications on file prior to encounter.     Current Outpatient Medications on File Prior to Encounter   Medication Sig Dispense Refill    furosemide (LASIX) 20 MG Tab Take 1 Tablet by mouth 2 times a day as needed (water retention). Take 1 tablet daily and may take additional 1 tablet as need for increase in weight by 3 lb/ water retention. 180 Tablet 3    predniSONE (DELTASONE) 10 MG Tab Take 1 Tablet by mouth 2 times a day. 180 Tablet 3    zolpidem (AMBIEN) 5 MG Tab Take 1 Tablet by mouth at bedtime as needed for Sleep for up to 30 days. 30 Tablet 0    MOVANTIK 25 MG Tab       Adalimumab (HUMIRA) 40 MG/0.4ML Prefilled Syringe Kit Inject 40 mg under the skin every 14 days.      ferrous sulfate 325 (65 Fe) MG tablet Take 325 mg by mouth 2 times a day.      docusate sodium (COLACE) 100 MG Cap Take 200 mg by mouth 2 times a day.      multivitamin Tab Take 1 Tablet by mouth every day.      dapagliflozin propanediol (FARXIGA) 10 MG Tab Take 1 Tablet by mouth every day for 90 days. 30 Tablet 2    DULoxetine (CYMBALTA) 60 MG Cap DR Particles delayed-release capsule Take 1 Capsule by mouth every day. 30 Capsule 2    potassium chloride SA (K-DUR) 10 MEQ Tab CR Take 1 Tablet by mouth every day for 90 days. 30 Each 2    vitamin D3  (CHOLECALCIFEROL) 1000 Unit (25 mcg) Tab Take 1 Tablet by mouth every day. 30 Tablet 2    acetaminophen (TYLENOL) 500 MG Tab Take 2 Tablets by mouth in the morning, at noon, and at bedtime. 180 Tablet 2    lidocaine (LIDODERM) 5 % Patch Place 2 Patches on the skin every 24 hours. 10 Patch 2    melatonin 3 MG Tab Take 1 Tablet by mouth at bedtime. 30 Tablet 2    omeprazole (PRILOSEC) 20 MG delayed-release capsule Take 1 Capsule by mouth 2 times a day. 60 Capsule 2    Adalimumab (HUMIRA PEN) 40 MG/0.4ML Pen-injector Kit INJECT 40MG SUBCUTANEOUSLY  EVERY 2 WEEKS 2 Each 4    albuterol 108 (90 Base) MCG/ACT Aero Soln inhalation aerosol Inhale 2 Puffs every 6 hours as needed for Shortness of Breath. 8.5 g 0    Naloxone (NARCAN) 4 MG/0.1ML Liquid One spray in one nostril for overdose and call 911. 1 Package 0       Allergies  Nsaids, Diphenhydramine, Mirtazapine, Penicillins, and Promethazine    ROS  Negative except as indicated in the HPI    Family History   Problem Relation Age of Onset    Hypertension Mother     Arthritis Mother     Depression Mother     Cancer Father         bladder    Schizophrenia Father     Cancer Maternal Grandmother         breast    Heart Disease Maternal Grandmother     Psychiatric Illness Other     Stroke Maternal Aunt     Other Neg Hx         no connective tissue disorders or known aortic disease       Social History     Socioeconomic History    Marital status:     Highest education level: Bachelor's degree (e.g., BA, AB, BS)   Occupational History    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   Tobacco Use    Smoking status: Some Days     Current packs/day: 0.00     Types: Cigarettes, Cigars     Last attempt to quit: 2023     Years since quittin.7     Passive exposure: Current    Smokeless tobacco: Never    Tobacco comments:     occasional cigar   Vaping Use    Vaping Use: Never used   Substance and Sexual Activity    Alcohol use: No     Alcohol/week: 0.0 oz     Comment: quit  ago- past  ETOH abuse    Drug use: No    Sexual activity: Yes     Partners: Female     Comment: ;    Other Topics Concern     Service No    Blood Transfusions Yes    Caffeine Concern No    Occupational Exposure No    Hobby Hazards No    Sleep Concern No    Stress Concern Yes    Weight Concern Yes    Special Diet No    Back Care No    Exercise No    Bike Helmet No    Seat Belt Yes    Self-Exams Yes   Social History Narrative    , lives in [place]     Social Determinants of Health     Financial Resource Strain: Low Risk  (9/7/2023)    Overall Financial Resource Strain (CARDIA)     Difficulty of Paying Living Expenses: Not very hard   Food Insecurity: No Food Insecurity (9/7/2023)    Hunger Vital Sign     Worried About Running Out of Food in the Last Year: Never true     Ran Out of Food in the Last Year: Never true   Transportation Needs: Unmet Transportation Needs (9/7/2023)    PRAPARE - Transportation     Lack of Transportation (Medical): Yes     Lack of Transportation (Non-Medical): Yes   Physical Activity: Sufficiently Active (9/7/2023)    Exercise Vital Sign     Days of Exercise per Week: 7 days     Minutes of Exercise per Session: 30 min   Stress: Stress Concern Present (9/7/2023)    [employer] [place] [employer] - Occupational Stress Questionnaire     Feeling of Stress : Rather much   Social Connections: Socially Integrated (9/7/2023)    Social Connection and Isolation Panel [NHANES]     Frequency of Communication with Friends and Family: More than three times a week     Frequency of Social Gatherings with Friends and Family: Twice a week     Attends Sabianist Services: More than 4 times per year     Active Member of Clubs or Organizations: Yes     Attends Club or Organization Meetings: More than 4 times per year     Marital Status:    Intimate Partner Violence: Not At Risk (9/7/2023)    Humiliation, Afraid, Rape, and Kick questionnaire     Fear of Current or Ex-Partner: No      "Emotionally Abused: No     Physically Abused: No     Sexually Abused: No   Housing Stability: Low Risk  (9/7/2023)    Housing Stability Vital Sign     Unable to Pay for Housing in the Last Year: No     Number of Places Lived in the Last Year: 1     Unstable Housing in the Last Year: No       Physical Exam  Vitals  BP 99/55   Pulse 79   Temp 35.9 °C (96.7 °F) (Temporal)   Resp 18   Ht 1.778 m (5' 10\")   Wt 104 kg (229 lb 15 oz)   SpO2 99%   General: Well Developed, Well Nourished, Age appropriate appearance  HEENT: Normocephalic, atraumatic  Psych: Normal mood and affect  Neck: Supple, nontender, no masses   Lungs: Breathing unlabored, No audible wheezing  Heart: Regular rate  Abdomen: ND  MSK:   On inspection of the left lower extremity his residual limb is perfused.  There is erythema and a open granulating area over the lateral aspect of his incision.  Tender to palpation throughout this area.  No obvious fluctuance.      Radiographs:      DX-CHEST-PORTABLE (1 VIEW)   Final Result         1.  Pulmonary edema and/or infiltrates are identified, which appear somewhat increased since the prior exam.   2.  Cardiomegaly      CT-EXTREMITY, LOWER WITH LEFT   Final Result         1.  Enhancing fluid collection at the distal soft tissues at below the knee amputation, appearance most compatible with abscess. Appears to communicate with the skin surface.   2.  Suspected abscess abuts the distal tibial stump concerning for changes of osteomyelitis.   3.  Diffuse subcutaneous fat stranding, compatible changes of edema and/or cellulitis.      CT-ABDOMEN-PELVIS WITH   Final Result         1.  4.6 cm mid abdominal hernia containing a portion of transverse without visualized obstructive changes   2.  Bilateral pulmonary infiltrates.   3.  Moderate right and trace left pleural effusion   4.  Diffuse subcutaneous fat stranding, appearance favoring changes of anasarca   5.  Atherosclerosis and atherosclerotic coronary artery " disease      DX-TIBIA AND FIBULA LEFT   Final Result         1.  No acute traumatic bony injury.   2.  Soft tissue gas at the distal amputation site anteriorly, appearance suggests open wound and/or underlying infectious etiology. Correlate with exam.      DX-CHEST-PORTABLE (1 VIEW)   Final Result         1.  Pulmonary edema and/or infiltrates.   2.  Trace right pleural effusion   3.  Cardiomegaly          Laboratory Values  Recent Labs     10/18/23  2326 10/19/23  0130 10/19/23  0308   WBC 32.9* 34.9* 36.2*   RBC 3.65* 3.65* 3.77*   HEMOGLOBIN 10.4* 10.4* 10.7*   HEMATOCRIT 33.2* 33.2* 34.9*   MCV 91.0 91.0 92.6   MCH 28.5 28.5 28.4   MCHC 31.3* 31.3* 30.7*   RDW 58.5* 58.4* 59.7*   PLATELETCT 240 249 258   MPV 9.4 9.6 9.2     Recent Labs     10/18/23  2050 10/18/23  2326   SODIUM 138 134*   POTASSIUM 4.2 3.9   CHLORIDE 100 100   CO2 27 26   GLUCOSE 73 79   BUN 17 19     Recent Labs     10/19/23  0130   INR 1.25*         Assessment: 48-year-old male with an abscess at his left BKA site.    Plan: We discussed the diagnosis and findings with the patient at length.  We reviewed possible non operative and operative interventions and the risks and benefits of each of these.  he had a chance to ask questions and all of these were answered to his satisfaction. The patient chose to proceed with operative intervention to include incision and drainage of his left BKA abscess with wound VAC placement. Risks and benefits of surgery were discussed which include but are not limited to bleeding, infection, neurovascular damage, malunion, nonunion, instability, limb length discrepancy, DVT, PE, MI, Stroke and death. They understand these risks and wish to proceed.      N.p.o. for OR      Jay Roe MD  Orthopedic Trauma

## 2023-10-19 NOTE — ED PROVIDER NOTES
"ED Provider Note    CHIEF COMPLAINT  Chief Complaint   Patient presents with    ALOC     Pt found to be altered by wife. Pt reports feeling lethargic. Pt A+Ox4 but not answering further questioning.        EXTERNAL RECORDS REVIEWED  Reviewed extensive hospitalization records most recently last month for acute heart failure reviewed operative records with Dr. Chambers dated March 2023    HPI/ROS  LIMITATION TO HISTORY   Patient is altered on arrival  OUTSIDE HISTORIAN(S):  Wife provided additional history    Richard Hubbard II is a 48 y.o. male who presents for evaluation of fever altered mental status swelling and pain around his left lower extremity where he recently had a below the knee amputation.  Patient has a complex and extensive medical history including rheumatoid arthritis for which he is on Humira, heart failure, morbid obesity.  He is status post left lower extremity BKA around 7 months ago.  According to his wife he has been not caring for himself as he has been caring for his mother because she is in the hospital with recent heart valve surgery.  He apparently wears a prosthetic.  He had been \"run down\" recently and when his wife saw him and talk to him this afternoon he was somewhat altered and felt feverish.  He presented here by ambulance.  On arrival the patient was febrile tachycardic and altered.  He was mumbling incoherent words at the time.  There is no report of trauma.    PAST MEDICAL HISTORY   has a past medical history of ADD (attention deficit disorder), Alcohol abuse, Allergic rhinitis (5/21/2009), Anemia (09/2019), Anxiety (5/21/2009), Apnea, sleep, Arrhythmia, Back pain (5/21/2009), Bipolar disorder (Formerly [facility]), Bleeding ulcer (5/21/2009), Bleeding ulcer (5/21/2009), Bowel habit changes, Daytime sleepiness, Depression (5/21/2009), Eczema (5/21/2009), GERD (gastroesophageal reflux disease), Heart burn, Heart murmur, Hemorrhagic disorder (Formerly [facility]), History of bleeding ulcers (2/12/2015), " Hypertension (2021), Insomnia (2009), Migraine (2009), Morning headache, Obesity, morbid (HCC) (2009), Osteomyelitis (HCC) (10/7/2019), Pain (2019), Pubic ramus fracture (HCC) (2019), Rheumatoid arteritis (HCC) (2019), Sleep apnea (2009), and Snoring.    SURGICAL HISTORY   has a past surgical history that includes gastric bypass laparoscopic (); irrigation & debridement ortho (Left, 10/9/2019); other surgical procedure; other surgical procedure (); total knee arthroplasty (Right, 2020); cholecystectomy (); tendon transfer (Right, 2020); other orthopedic surgery (Right, ); and knee amputation below (Left, 3/9/2023).    FAMILY HISTORY  Family History   Problem Relation Age of Onset    Hypertension Mother     Arthritis Mother     Depression Mother     Cancer Father         bladder    Schizophrenia Father     Cancer Maternal Grandmother         breast    Heart Disease Maternal Grandmother     Psychiatric Illness Other     Stroke Maternal Aunt     Other Neg Hx         no connective tissue disorders or known aortic disease       SOCIAL HISTORY  Social History     Tobacco Use    Smoking status: Some Days     Current packs/day: 0.00     Types: Cigarettes, Cigars     Last attempt to quit: 2023     Years since quittin.7     Passive exposure: Current    Smokeless tobacco: Never    Tobacco comments:     occasional cigar   Vaping Use    Vaping Use: Never used   Substance and Sexual Activity    Alcohol use: No     Alcohol/week: 0.0 oz     Comment: quit  ago- past ETOH abuse    Drug use: No    Sexual activity: Yes     Partners: Female     Comment: ;        CURRENT MEDICATIONS    Current Facility-Administered Medications:     vancomycin (Vancocin) 2,750 mg in  mL IVPB, 25 mg/kg, Intravenous, Once, Temo Bernstein M.D., Last Rate: 166.7 mL/hr at 10/18/23 2138, 2,750 mg at 10/18/23 2138    norepinephrine (Levophed) 8 mg in 250 mL NS infusion  (premix), 0-1 mcg/kg/min (Ideal), Intravenous, Continuous, Temo Bernstein M.D.    Linezolid (Zyvox) premix 600 mg, 600 mg, Intravenous, BID, Maxi Saravia M.D.    piperacillin-tazobactam (Zosyn) 4.5 g in  mL IVPB, 4.5 g, Intravenous, Once **AND** [START ON 10/19/2023] piperacillin-tazobactam (Zosyn) 4.5 g in  mL IVPB, 4.5 g, Intravenous, Q8HRS, Maxi Saravia M.D.    Current Outpatient Medications:     furosemide (LASIX) 20 MG Tab, Take 1 Tablet by mouth 2 times a day as needed (water retention). Take 1 tablet daily and may take additional 1 tablet as need for increase in weight by 3 lb/ water retention., Disp: 180 Tablet, Rfl: 3    predniSONE (DELTASONE) 10 MG Tab, Take 1 Tablet by mouth 2 times a day., Disp: 180 Tablet, Rfl: 3    zolpidem (AMBIEN) 5 MG Tab, Take 1 Tablet by mouth at bedtime as needed for Sleep for up to 30 days., Disp: 30 Tablet, Rfl: 0    MOVANTIK 25 MG Tab, , Disp: , Rfl:     Adalimumab (HUMIRA) 40 MG/0.4ML Prefilled Syringe Kit, Inject 40 mg under the skin every 14 days., Disp: , Rfl:     ferrous sulfate 325 (65 Fe) MG tablet, Take 325 mg by mouth 2 times a day., Disp: , Rfl:     docusate sodium (COLACE) 100 MG Cap, Take 200 mg by mouth 2 times a day., Disp: , Rfl:     multivitamin Tab, Take 1 Tablet by mouth every day., Disp: , Rfl:     dapagliflozin propanediol (FARXIGA) 10 MG Tab, Take 1 Tablet by mouth every day for 90 days., Disp: 30 Tablet, Rfl: 2    DULoxetine (CYMBALTA) 60 MG Cap DR Particles delayed-release capsule, Take 1 Capsule by mouth every day., Disp: 30 Capsule, Rfl: 2    potassium chloride SA (K-DUR) 10 MEQ Tab CR, Take 1 Tablet by mouth every day for 90 days., Disp: 30 Each, Rfl: 2    vitamin D3 (CHOLECALCIFEROL) 1000 Unit (25 mcg) Tab, Take 1 Tablet by mouth every day., Disp: 30 Tablet, Rfl: 2    acetaminophen (TYLENOL) 500 MG Tab, Take 2 Tablets by mouth in the morning, at noon, and at bedtime., Disp: 180 Tablet, Rfl: 2    lidocaine (LIDODERM) 5 % Patch,  "Place 2 Patches on the skin every 24 hours., Disp: 10 Patch, Rfl: 2    melatonin 3 MG Tab, Take 1 Tablet by mouth at bedtime., Disp: 30 Tablet, Rfl: 2    omeprazole (PRILOSEC) 20 MG delayed-release capsule, Take 1 Capsule by mouth 2 times a day., Disp: 60 Capsule, Rfl: 2    Adalimumab (HUMIRA PEN) 40 MG/0.4ML Pen-injector Kit, INJECT 40MG SUBCUTANEOUSLY  EVERY 2 WEEKS, Disp: 2 Each, Rfl: 4    albuterol 108 (90 Base) MCG/ACT Aero Soln inhalation aerosol, Inhale 2 Puffs every 6 hours as needed for Shortness of Breath., Disp: 8.5 g, Rfl: 0    Naloxone (NARCAN) 4 MG/0.1ML Liquid, One spray in one nostril for overdose and call 911., Disp: 1 Package, Rfl: 0          ALLERGIES  Allergies   Allergen Reactions    Nsaids      Bleeding, \"I had a bleeding ulcer\"    Diphenhydramine Unspecified     \"I get agitated\"    Mirtazapine Unspecified     I had a hard time waking up, lacking mental focus.     Penicillins      \"Had some dizziness\"  Tolerated Unasyn 10/2019  Tolerated Zosyn 02/2023    Promethazine Unspecified     \"I get very agitated\"       PHYSICAL EXAM  VITAL SIGNS: BP 95/54   Pulse (!) 105   Temp 37.9 °C (100.3 °F) (Temporal)   Resp 18   Ht 1.803 m (5' 11\")   Wt 113 kg (250 lb)   SpO2 88%   BMI 34.87 kg/m²    Pulse ox interpretation: Hypoxic on room air  Constitutional: Oriented to person only appears critically ill tachypneic  HEENT: Atraumatic normocephalic, pupils are equal round reactive to light extraocular movements are intact. The nares is clear, external ears are normal, mouth shows moist mucous membranes normal dentition for age  Neck: Supple, no JVD no tracheal deviation  Cardiovascular: Tachycardic no murmurs no murmur rub or gallop 2+ pulses peripherally x4  Thorax & Lungs: Bilateral rhonchi  GI: Soft nontender nondistended positive large right mid abdominal reducible hernia  Skin: Warm dry no acute rash or lesion  Musculoskeletal: Left lower extremity below the knee amputation is notable for erythema " and focal tenderness with some drainage at the distal aspect with crepitus sluggish  Neurologic: Cranial nerves III through XII are grossly intact no sensory deficit no cerebellar dysfunction   Psychiatric: Sluggish          DIAGNOSTIC STUDIES / PROCEDURES    Physician procedure: Central venous catheter: Indication critically ill patient requiring vasopressors and central venous access.  Emergent consent was obtained due to the patient's altered mental status.  The right anterior neck was prepped with chlorhexidine x3.  Sterile gown gloves and universal precautions were utilized.  Dynamic real-time ultrasound was utilized to visualize a large right internal jugular vessel.  5 cc of lidocaine without epinephrine was demonstrated in the skin and deeper tissues for anesthesia.  Finder needle was inserted on the first attempt.  Dark nonpulsatile blood was aspirated.  Seldinger wire was advanced without resistance and the tract was dilated.  Catheter was placed over the wire and the wire was removed.  All ports were flushed.  The line was sewn into place and sterile dressing applied.  Blood loss less than 1 cc.  Chest x-ray confirmed placement.  No evidence of complication such as pneumothorax or blood loss.    LABS  Results for orders placed or performed during the hospital encounter of 10/18/23   Lactic acid (lactate)   Result Value Ref Range    Lactic Acid 2.1 (H) 0.5 - 2.0 mmol/L   CBC With Differential   Result Value Ref Range    WBC 29.7 (H) 4.8 - 10.8 K/uL    RBC 4.01 (L) 4.70 - 6.10 M/uL    Hemoglobin 11.4 (L) 14.0 - 18.0 g/dL    Hematocrit 36.8 (L) 42.0 - 52.0 %    MCV 91.8 81.4 - 97.8 fL    MCH 28.4 27.0 - 33.0 pg    MCHC 31.0 (L) 32.3 - 36.5 g/dL    RDW 59.4 (H) 35.9 - 50.0 fL    Platelet Count 265 164 - 446 K/uL    MPV 8.9 (L) 9.0 - 12.9 fL    Neutrophils-Polys 86.60 (H) 44.00 - 72.00 %    Lymphocytes 8.50 (L) 22.00 - 41.00 %    Monocytes 3.70 0.00 - 13.40 %    Eosinophils 0.00 0.00 - 6.90 %    Basophils 0.10  0.00 - 1.80 %    Immature Granulocytes 1.10 (H) 0.00 - 0.90 %    Nucleated RBC 0.10 0.00 - 0.20 /100 WBC    Neutrophils (Absolute) 25.71 (H) 1.82 - 7.42 K/uL    Lymphs (Absolute) 2.52 1.00 - 4.80 K/uL    Monos (Absolute) 1.11 (H) 0.00 - 0.85 K/uL    Eos (Absolute) 0.00 0.00 - 0.51 K/uL    Baso (Absolute) 0.04 0.00 - 0.12 K/uL    Immature Granulocytes (abs) 0.34 (H) 0.00 - 0.11 K/uL    NRBC (Absolute) 0.03 K/uL   Comp Metabolic Panel   Result Value Ref Range    Sodium 138 135 - 145 mmol/L    Potassium 4.2 3.6 - 5.5 mmol/L    Chloride 100 96 - 112 mmol/L    Co2 27 20 - 33 mmol/L    Anion Gap 11.0 7.0 - 16.0    Glucose 73 65 - 99 mg/dL    Bun 17 8 - 22 mg/dL    Creatinine 0.96 0.50 - 1.40 mg/dL    Calcium 8.2 (L) 8.5 - 10.5 mg/dL    Correct Calcium 8.8 8.5 - 10.5 mg/dL    AST(SGOT) 10 (L) 12 - 45 U/L    ALT(SGPT) 8 2 - 50 U/L    Alkaline Phosphatase 119 (H) 30 - 99 U/L    Total Bilirubin 0.6 0.1 - 1.5 mg/dL    Albumin 3.3 3.2 - 4.9 g/dL    Total Protein 6.5 6.0 - 8.2 g/dL    Globulin 3.2 1.9 - 3.5 g/dL    A-G Ratio 1.0 g/dL   Urinalysis    Specimen: Urine   Result Value Ref Range    Color Yellow     Character Clear     Specific Gravity 1.014 <1.035    Ph 5.5 5.0 - 8.0    Glucose Negative Negative mg/dL    Ketones Negative Negative mg/dL    Protein Negative Negative mg/dL    Bilirubin Negative Negative    Urobilinogen, Urine 0.2 Negative    Nitrite Negative Negative    Leukocyte Esterase Trace (A) Negative    Occult Blood Negative Negative    Micro Urine Req Microscopic    PROCALCITONIN   Result Value Ref Range    Procalcitonin 1.42 (H) <0.25 ng/mL   URINE MICROSCOPIC (W/UA)   Result Value Ref Range    WBC 5-10 (A) /hpf    RBC 0-2 (A) /hpf    Bacteria Negative None /hpf    Epithelial Cells Negative /hpf    Hyaline Cast 0-2 /lpf   ESTIMATED GFR   Result Value Ref Range    GFR (CKD-EPI) 98 >60 mL/min/1.73 m 2   EKG   Result Value Ref Range    Report       [facility]    Test Date:   2023-10-18  Pt Name:    LESLIE BADILLO             Department: ER  MRN:        [id number]                      Room:        01  Gender:     Male                         Technician: [id number]  :        [id number]                   Requested By:ER TRIAGE PROTOCOL  Order #:    [id number]                    Reading MD:    Measurements  Intervals                                Axis  Rate:       111                          P:          61  RI:         164                          QRS:        -24  QRSD:       118                          T:          85  QT:         347  QTc:        472    Interpretive Statements  Sinus tachycardia  Nonspecific intraventricular conduction delay  Borderline repolarization abnormality  ST elevation, consider inferior injury  Compared to ECG 2023 17:08:01  Intraventricular conduction delay now present  ST (T wave) deviation now present  Myocardial infarct finding now present  Sinus rhythm no longe r present  First degree AV block no longer present  Left bundle-branch block no longer present       *Note: Due to a large number of results and/or encounters for the requested time period, some results have not been displayed. A complete set of results can be found in Results Review.     EKG twelve-lead interpretation by me sinus tachycardia intraventricular conduction delay ST changes concerning for inferior injury without STEMI morphology.  No evidence of heart block or arrhythmia    RADIOLOGY  I have independently interpreted the diagnostic imaging associated with this visit and am waiting the final reading from the radiologist.   My preliminary interpretation is as follows: Pulmonary edema with infiltrates, abscess noted in the left below the knee amputation soft tissues  Radiologist interpretation:   DX-CHEST-PORTABLE (1 VIEW)   Final Result         1.  Pulmonary edema and/or infiltrates are identified, which appear somewhat increased since the prior exam.   2.  Cardiomegaly       CT-EXTREMITY, LOWER WITH LEFT   Final Result         1.  Enhancing fluid collection at the distal soft tissues at below the knee amputation, appearance most compatible with abscess. Appears to communicate with the skin surface.   2.  Suspected abscess abuts the distal tibial stump concerning for changes of osteomyelitis.   3.  Diffuse subcutaneous fat stranding, compatible changes of edema and/or cellulitis.      CT-ABDOMEN-PELVIS WITH   Final Result         1.  4.6 cm mid abdominal hernia containing a portion of transverse without visualized obstructive changes   2.  Bilateral pulmonary infiltrates.   3.  Moderate right and trace left pleural effusion   4.  Diffuse subcutaneous fat stranding, appearance favoring changes of anasarca   5.  Atherosclerosis and atherosclerotic coronary artery disease      DX-TIBIA AND FIBULA LEFT   Final Result         1.  No acute traumatic bony injury.   2.  Soft tissue gas at the distal amputation site anteriorly, appearance suggests open wound and/or underlying infectious etiology. Correlate with exam.      DX-CHEST-PORTABLE (1 VIEW)   Final Result         1.  Pulmonary edema and/or infiltrates.   2.  Trace right pleural effusion   3.  Cardiomegaly          COURSE & MEDICAL DECISION MAKING    ED Observation Status? No; Patient does not meet criteria for ED Observation.     INITIAL ASSESSMENT, COURSE AND PLAN  Care Narrative:     This is a critically ill 48-year-old gentleman with extensive comorbidities who presents here with fever tachycardia and altered mental status.  Septic protocols initiated immediately upon arrival.  2 large-bore IVs were established.  Blood cultures were performed and sepsis bolus was ordered and initiated.  Broad-spectrum antibiotics in the form of vancomycin and Rocephin were immediately ordered.  The patient had extensive evaluation today.  Etiology of infection could be COVID, pneumonia possible soft tissue infection of the left lower extremity or  intra-abdominal source.  Patient has profound leukocytosis with left shift, elevated lactic acid.  He is quite ill despite sepsis bolus the patient remained hypotensive and subsequent central venous catheter and Levophed was initiated.  Consultation with intensivist with Dr. Saravia was obtained.  CT scan of the abdomen pelvis demonstrated a nonincarcerated hernia but no other obvious intra-abdominal process.  There was diffuse fat stranding and abscess noted in the below the knee amputation soft tissues.  Emergent consultation with Dr. Roe with orthopedics was obtained.  He indicated that aggressive resuscitation in the ICU overnight is indicated and if anything changes they will be available for operative intervention which I think is reasonable.  There does not appear to be significant gas tracking up any of the fascial planes to suggest necrotizing fasciitis.  The patient does appear to be responding to fluids and antibiotics.  The patient will be admitted to the ICU in guarded condition at this time with orthopedic consultation.      HYDRATION: Based on the patient's presentation of Sepsis the patient was given IV fluids. IV Hydration was used because oral hydration was not adequate alone. Upon recheck following hydration, the patient was improved.      ADDITIONAL PROBLEM LIST    DISPOSITION AND DISCUSSIONS  I have discussed management of the patient with the following physicians and ANNIE's: Discussed plan of care with intensivist Dr. Saravia as well as Dr. Roe with orthopedics discussed resuscitation with    Discussion of management with other QHP or appropriate source(s): ER pharmacist    Escalation of care considered, and ultimately not performed: Not applicable    Barriers to care at this time, including but not limited to: Patient is altered    Decision tools and prescription drugs considered including, but not limited to: None    FINAL DIAGNOSIS  1. Severe sepsis (HCC)    2. Altered mental status,  unspecified altered mental status type    3. Infection of amputation stump of left lower extremity (HCC)         CRITICAL CARE  The very real possibilty of a deterioration of this patient's condition required the highest level of my preparedness for sudden, emergent intervention.  I provided critical care services, which included medication orders, frequent reevaluations of the patient's condition and response to treatment, ordering and reviewing test results, and discussing the case with various consultants.  The critical care time associated with the care of the patient was 45 minutes. Review chart for interventions. This time is exclusive of any other billable procedures.     Electronically signed by: Temo Bernstein M.D., 10/18/2023 10:56 PM

## 2023-10-19 NOTE — PROGRESS NOTES
4 Eyes Skin Assessment Completed by Ame RN and Washington RN.    Head WDL  Ears WDL  Nose WDL  Mouth Bleeding, to back of throat  Neck WDL  Breast/Chest WDL  Shoulder Blades Redness and Blanching  Spine Redness and Blanching  (R) Arm/Elbow/Hand Redness and Blanching  (L) Arm/Elbow/Hand Redness and Blanching  Abdomen WDL  Groin WDL  Scrotum/Coccyx/Buttocks Redness and Blanching  (R) Leg Redness, discoloration, cool.   (L) Leg Incision, Dressing and wound vac in place. Dressing is clean,dry,intact. Left BKA.   (R) Heel/Foot/Toe Redness and Blanching  (L) Heel/Foot/Toe N/A - Left BKA          Devices In Places Tele Box, Blood Pressure Cuff, Pulse Ox, Rivera, SCD's, and Central Line      Interventions In Place Gray Ear Foams, Pillows, Q2 Turns, and Low Air Loss Mattress    Possible Skin Injury No    Pictures Uploaded Into Epic N/A  Wound Consult Placed N/A  RN Wound Prevention Protocol Ordered Yes

## 2023-10-19 NOTE — ASSESSMENT & PLAN NOTE
-most recent ECHO EF 55%  -holding off on ACE/ARB/BB while on pressors for hypotension  -Lasix as tolerated  -daily weight  -strict I&O

## 2023-10-19 NOTE — ED NOTES
Pt noted to desat on transport to CT. Pt placed back on 6LPM via oxymask.    Central line placed by Dr. Bernstein. Confirmed by xray.

## 2023-10-19 NOTE — OR NURSING
Patient arrived to PACU from OR in stable condition.   Report received at 1004.  VSS and initial assessment complete. Pt c/o pain, awaiting PACU orders.  1040 PACU orders received. Medicated for pain.  1055 medicated with PO, tolerated well.  1115 pt stable for transport to ICU.    Report given to Ame CUEVAS opportunity given for questions.   Patient discharged to RICU in stable condition, consistent with preoperative status.

## 2023-10-19 NOTE — ASSESSMENT & PLAN NOTE
Careful with IVF  Hold home Lasix as patient is in septic shock, anticipate restart of heart failure medications when able

## 2023-10-19 NOTE — ASSESSMENT & PLAN NOTE
This is Sepsis Present on admission  SIRS criteria identified on my evaluation include: Fever, with temperature greater than 100.9 deg F and Leukocytosis, with WBC greater than 12,000  Clinical indicators of end organ dysfunction include Hypotension with systolic blood pressure less than 90 or MAP less than 65 and Toxic Metabolic Encephalopathy  Source is likely osteomyelitis  Sepsis protocol initiated  Crystalloid Fluid Administration: Fluid resuscitation ordered per standard protocol - 30 mL/kg per current or ideal body weight  IV antibiotics as appropriate for source of sepsis  Reassessment: I have reassessed the patient's hemodynamic status    Orthopedics consulted in the ED, surgery plan per Orthopedics  Blood cultures collected  Received IVF in the ED  Patient received vancomycin. Continue Linezolid and Zosyn

## 2023-10-19 NOTE — ED TRIAGE NOTES
Pt BIBA via [facility]    Chief Complaint   Patient presents with    ALOC     Pt found to be altered by wife. Pt reports feeling lethargic. Pt A+Ox4 but not answering further questioning.      Pt sating 85% on RA. Placed on 10LPM via oxymask and sating 94%. Skin pink dry and hot. Vitals/tele on the monitor.

## 2023-10-19 NOTE — ADDENDUM NOTE
Addendum  created 10/19/23 1105 by Riaz Duncan M.D.    Order list changed, Pharmacy for encounter modified

## 2023-10-19 NOTE — ASSESSMENT & PLAN NOTE
Holding home duloxetine as patient is on linezolid to minimize serotonin syndrome  Anticipate restart after discharge

## 2023-10-19 NOTE — ASSESSMENT & PLAN NOTE
-PDMP reviewed. He receives 120 (30mg) MS IR/month and 60 (15mg) MS ER/month - we will restart this post-op  -PRN IV Dilaudid for severe break through   -bowel protocol

## 2023-10-19 NOTE — ASSESSMENT & PLAN NOTE
Patient reports intermittent tobacco usage, last 1-2 weeks ago  Declined NRT at this time  Further counseling on discharge

## 2023-10-19 NOTE — ASSESSMENT & PLAN NOTE
On Humira and intermittent prednisone  Will hold off on restarting prednisone for now, cortisol level pending  If hemodynamics worsen, will consider stress dose steroids

## 2023-10-20 PROBLEM — L02.416 ABSCESS OF LEFT LOWER EXTREMITY: Status: ACTIVE | Noted: 2023-01-01

## 2023-10-20 PROBLEM — R78.81 MRSA BACTEREMIA: Status: RESOLVED | Noted: 2023-01-01 | Resolved: 2023-01-01

## 2023-10-20 PROBLEM — B95.62 MRSA BACTEREMIA: Status: ACTIVE | Noted: 2023-01-01

## 2023-10-20 PROBLEM — R78.81 BACTEREMIA DUE TO STAPHYLOCOCCUS AUREUS: Status: ACTIVE | Noted: 2023-01-01

## 2023-10-20 PROBLEM — B95.61 BACTEREMIA DUE TO STAPHYLOCOCCUS AUREUS: Status: ACTIVE | Noted: 2023-01-01

## 2023-10-20 PROBLEM — B95.62 MRSA BACTEREMIA: Status: RESOLVED | Noted: 2023-01-01 | Resolved: 2023-01-01

## 2023-10-20 NOTE — CARE PLAN
Problem: Pain - Standard  Goal: Alleviation of pain or a reduction in pain to the patient’s comfort goal  Outcome: Progressing     Problem: Knowledge Deficit - Standard  Goal: Patient and family/care givers will demonstrate understanding of plan of care, disease process/condition, diagnostic tests and medications  Outcome: Progressing     Problem: Hemodynamics  Goal: Patient's hemodynamics, fluid balance and neurologic status will be stable or improve  Outcome: Progressing     Problem: Fluid Volume  Goal: Fluid volume balance will be maintained  Outcome: Progressing   The patient is Stable - Low risk of patient condition declining or worsening    Shift Goals: Titrate levophed off.  Clinical Goals: stable hemodynamics, monitor wound and dressing, pain control.  Patient Goals: Rest, pain control  Family Goals: JI    Progress made toward(s) clinical / shift goals:  Levophed weaned off. Pt has transfer orders. VSS. Pain regimen changed and better controlled for patient.

## 2023-10-20 NOTE — ASSESSMENT & PLAN NOTE
"MSSA bacteremia with blood cultures x2 positive  Echocardiogram has been ordered  Infectious disease has been consulted\"    See above  "

## 2023-10-20 NOTE — PROGRESS NOTES
Pharmacy Vancomycin Kinetics Note for 10/20/2023     48 y.o. male on Vancomycin day # 1     Vancomycin Indication (AUC Dosing): S. aureus bacteremia    Provider specified end date: 10/31/23    Active Antibiotics (From admission, onward)      Ordered     Ordering Provider       u Oct 19, 2023 10:14 PM    10/19/23 2214  vancomycin (Vancocin) 2,500 mg in  mL IVPB  (vancomycin (VANCOCIN) IV (LD + Maintenance))  ONCE         Roxanna THRASHER Savannaestrada       Thu Oct 19, 2023 10:02 PM    10/19/23 2202  MD Alert...Vancomycin per Pharmacy  PHARMACY TO DOSE        Question:  Indication(s) for vancomycin?  Answer:  Other (comments)  Comment:  MRSA -Bld cx    Roxanna THRASHER Savannaestrada            Dosing Weight: 104 kg (229 lb 4.5 oz)      Admission History: Admitted on 10/18/2023 for Septic shock (HCC) [A41.9, R65.21]  Pertinent history: Patient admitted with BKA infection. Blood culture with MRSA/MSSA    Allergies:     Nsaids, Diphenhydramine, Mirtazapine, Penicillins, and Promethazine     Pertinent cultures to date:     Results       Procedure Component Value Units Date/Time    Blood Culture - Draw one set from central line if present [[id number]]  (Abnormal) Collected: 10/18/23 2123    Order Status: Completed Specimen: Blood from Line Updated: 10/19/23 2023     Significant Indicator POS     Source BLD     Site peripheral     Culture Result Growth detected by Bactec instrument. 10/19/2023  20:15  Gram Stain: Gram positive cocci: Possible Staphylococcus sp.  Methicillin Resistant Staphylococcus aureus (MRSA)  detected by PCR.  Susceptibility to follow.      Narrative:      CALL  Swann  ICC tel. [phone],  CALLED  ICC tel. [phone] 10/19/2023, 20:17, RB PERF. RESULTS CALLED  TO:[id number] + message sent to Pharm through Voalte  Blood Cultures X2. Draw one blood culture from central line  (including implanted port) and one blood culture  peripherally. If no central line present draw blood cultures  times two peripherally from different  sites  Release to patient->Immediate  Right Forearm/Arm    Acid Fast Stain [[id number]] Collected: 10/19/23 0945    Order Status: Completed Specimen: Tissue Updated: 10/19/23 2017     Significant Indicator NEG     Source TISS     Site Left Leg #1     AFB Smear Results No acid fast bacilli seen.    Narrative:      Surgery Specimen    Fungal Smear [[id number]] Collected: 10/19/23 0945    Order Status: Completed Specimen: Tissue Updated: 10/19/23 2017     Significant Indicator NEG     Source TISS     Site Left Leg #1     Fungal Smear Results No fungal elements seen.    Narrative:      Surgery Specimen    GRAM STAIN [[id number]] Collected: 10/19/23 0945    Order Status: Completed Specimen: Tissue Updated: 10/19/23 2017     Significant Indicator .     Source TISS     Site Left Leg #1     Gram Stain Result Moderate WBCs.  Many Gram positive cocci.      Narrative:      Surgery Specimen    CULTURE TISSUE W/ GRM STAIN [[id number]] Collected: 10/19/23 0945    Order Status: No result Specimen: Tissue Updated: 10/19/23 2016     Significant Indicator NEG     Source TISS     Site Left Leg #1     Culture Result -     Gram Stain Result Moderate WBCs.  Many Gram positive cocci.      Narrative:      Surgery Specimen    Anaerobic Culture [[id number]] Collected: 10/19/23 0945    Order Status: No result Specimen: Tissue Updated: 10/19/23 2016     Significant Indicator NEG     Source TISS     Site Left Leg #1     Culture Result -    Narrative:      Surgery Specimen    Fungal Culture [[id number]] Collected: 10/19/23 0945    Order Status: No result Specimen: Tissue Updated: 10/19/23 2016     Significant Indicator NEG     Source TISS     Site Left Leg #1     Culture Result -     Fungal Smear Results No fungal elements seen.    Narrative:      Surgery Specimen    AFB Culture [[id number]] Collected: 10/19/23 0945    Order Status: Completed Specimen: Tissue Updated: 10/19/23 2016     Significant Indicator NEG     Source TISS     Site Left Leg #1      Culture Result Culture in progress.     AFB Smear Results No acid fast bacilli seen.    Narrative:      Surgery Specimen    Acid Fast Stain [[id number]] Collected: 10/19/23 0946    Order Status: Completed Specimen: Tissue Updated: 10/19/23 2016     Significant Indicator NEG     Source TISS     Site Left Leg #2     AFB Smear Results No acid fast bacilli seen.    Narrative:      Surgery Specimen    Fungal Smear [[id number]] Collected: 10/19/23 0946    Order Status: Completed Specimen: Tissue Updated: 10/19/23 2016     Significant Indicator NEG     Source TISS     Site Left Leg #2     Fungal Smear Results No fungal elements seen.    Narrative:      Surgery Specimen    GRAM STAIN [[id number]] Collected: 10/19/23 0946    Order Status: Completed Specimen: Tissue Updated: 10/19/23 2016     Significant Indicator .     Source TISS     Site Left Leg #2     Gram Stain Result Few WBCs.  Few Gram positive cocci.      Narrative:      Surgery Specimen    CULTURE TISSUE W/ GRM STAIN [[id number]] Collected: 10/19/23 0946    Order Status: No result Specimen: Tissue Updated: 10/19/23 2016     Significant Indicator NEG     Source TISS     Site Left Leg #2     Culture Result -     Gram Stain Result Few WBCs.  Few Gram positive cocci.      Narrative:      Surgery Specimen    Anaerobic Culture [[id number]] Collected: 10/19/23 0946    Order Status: No result Specimen: Tissue Updated: 10/19/23 2016     Significant Indicator NEG     Source TISS     Site Left Leg #2     Culture Result -    Narrative:      Surgery Specimen    Fungal Culture [[id number]] Collected: 10/19/23 0946    Order Status: No result Specimen: Tissue Updated: 10/19/23 2016     Significant Indicator NEG     Source TISS     Site Left Leg #2     Culture Result -     Fungal Smear Results No fungal elements seen.    Narrative:      Surgery Specimen    AFB Culture [[id number]] Collected: 10/19/23 0946    Order Status: Completed Specimen: Tissue Updated: 10/19/23 2016     Significant  Indicator NEG     Source TISS     Site Left Leg #2     Culture Result Culture in progress.     AFB Smear Results No acid fast bacilli seen.    Narrative:      Surgery Specimen    Blood Culture - Draw one set from central line if present [[id number]]  (Abnormal) Collected: 10/18/23 2005    Order Status: Completed Specimen: Blood from Peripheral Updated: 10/19/23 1738     Significant Indicator POS     Source BLD     Site PERIPHERAL     Culture Result Growth detected by Bactec instrument. 10/19/2023  17:35  Gram Stain: Gram positive cocci: Possible Staphylococcus sp.  Staphylococcus aureus (methicillin sensitive)  detected by PCR.  Susceptibility to follow.      Narrative:      CALL  [facility]  [facility] tel. [phone],  CALLED  ICC tel. [phone] 10/19/2023, 17:38, RB PERF. RESULTS CALLED TO:  Ame CUEVAS, [id number] and [facility]  Blood Cultures X2. Draw one blood culture from central line  (including implanted port) and one blood culture  peripherally. If no central line present draw blood cultures  times two peripherally from different sites  Release to patient->Immediate  Right Forearm/Arm    MRSA By PCR (Amp) [[id number]] Collected: 10/18/23 2335    Order Status: Completed Specimen: Respirate from Nares Updated: 10/19/23 1241     MRSA by PCR POSITIVE    Urinalysis [[id number]]  (Abnormal) Collected: 10/18/23 2102    Order Status: Completed Specimen: Urine Updated: 10/18/23 2139     Color Yellow     Character Clear     Specific Gravity 1.014     Ph 5.5     Glucose Negative mg/dL      Ketones Negative mg/dL      Protein Negative mg/dL      Bilirubin Negative     Urobilinogen, Urine 0.2     Nitrite Negative     Leukocyte Esterase Trace     Occult Blood Negative     Micro Urine Req Microscopic    Narrative:      Release to patient->Immediate  Indication for culture:->Evaluation for sepsis without a  clear source of infection    Urine Culture (New) [[id number]] Collected: 10/18/23 2102    Order Status: Sent Specimen: Urine Updated:  "10/18/23 2115    Narrative:      Release to patient->Immediate  Indication for culture:->Evaluation for sepsis without a  clear source of infection            Labs:     Estimated Creatinine Clearance: 118.6 mL/min (by C-G formula based on SCr of 0.93 mg/dL).  Recent Labs     10/18/23  2050 10/18/23  2326 10/19/23  0130 10/19/23  0308   WBC 29.7* 32.9* 34.9* 36.2*   NEUTSPOLYS 86.60* 85.50* 86.50* 86.40*     Recent Labs     10/18/23  2050 10/18/23  2326   BUN 17 19   CREATININE 0.96 0.93   ALBUMIN 3.3 2.7*       Intake/Output Summary (Last 24 hours) at 10/20/2023 0110  Last data filed at 10/19/2023 1800  Gross per 24 hour   Intake 2136.57 ml   Output 5225 ml   Net -3088.43 ml      /58   Pulse 90   Temp 36.6 °C (97.9 °F)   Resp (!) 21   Ht 1.778 m (5' 10\")   Wt 104 kg (229 lb 15 oz)   SpO2 97%  Temp (24hrs), Av.8 °C (98.3 °F), Min:35.9 °C (96.7 °F), Max:37.4 °C (99.3 °F)      List concerns for Vancomycin clearance:     None    Pharmacokinetics:     AUC kinetics:   Ke (hr ^-1): 0.1023 hr^-1  Half life: 6.78 hr  Clearance: 6.514  Estimated TDD: 3257  Estimated Dose: 1194  Estimated interval: 8.8    A/P:     -  Vancomycin dose: vanco 1500mg IV q12h    -  Next vancomycin level(s): 2-3 days    -  Predicted vancomycin AUC from initial AUC test calculator: 461 mg·hr/L    -  Comments: Start twice daily dosing given age, renal indices, and body habitus. Minimal concerns regarding accumulation. Pharmacy to follow.     Juan Pablo Wood, PharmD    "

## 2023-10-20 NOTE — CARE PLAN
The patient is Watcher - Medium risk of patient condition declining or worsening    Shift Goals  Clinical Goals: stable hemodynamics, monitor wound and dressing  Patient Goals: Rest, pain control  Family Goals: JI    Progress made toward(s) clinical / shift goals:    Problem: Pain - Standard  Goal: Alleviation of pain or a reduction in pain to the patient’s comfort goal  Outcome: Progressing     Problem: Knowledge Deficit - Standard  Goal: Patient and family/care givers will demonstrate understanding of plan of care, disease process/condition, diagnostic tests and medications  Outcome: Progressing     Problem: Hemodynamics  Goal: Patient's hemodynamics, fluid balance and neurologic status will be stable or improve  Outcome: Progressing     Problem: Fluid Volume  Goal: Fluid volume balance will be maintained  Outcome: Progressing     Problem: Respiratory  Goal: Patient will achieve/maintain optimum respiratory ventilation and gas exchange  Outcome: Progressing     Problem: Skin Integrity  Goal: Skin integrity is maintained or improved  Outcome: Progressing     Problem: Fall Risk  Goal: Patient will remain free from falls  Outcome: Progressing   Pain managed using PRNs (see MAR). BP managed using PRNs (see MAR). Patient expresses understanding of plan of care.     Patient is not progressing towards the following goals: N/A

## 2023-10-20 NOTE — PROGRESS NOTES
Notified via Araceli from lab that pt had one positive blood culture for gram positive cocci and clusters, and the PCR was positive for staph. Notified Provider, Estefanía Cuevas at 1740 of this result. Provider aware, no new orders at this time.

## 2023-10-20 NOTE — PROGRESS NOTES
Critical Care Progress Note    Date of admission  10/18/2023    Chief Complaint  LLE wound infection    Hospital Course  Richard Hubbard is a 48 year old male with PMH significant for ADD, RA on immunotherapy, MONICA, gastric bypass, ELISEO, ETOH abuse, GERD, CHF with moderate AS and MR, pulmonary HTN, left BKA, chronic pain on chronic opioid therapy and planovalgus of the right foot who presented 10/18/23 with AMS and fevers. He was seen in the OP setting 10/10 to discuss options for his acquired right foot planovalgus with plan for surgical intervention 10/25.  In the ED, he had a fever 102.2 and his LLE BKA stump was noted to be hot and tender. CT scan showed enhancing fluid collection at the distal soft tissues below the knee amputation consistent with abscess. He was treated with sepsis protocol and admitted to ICU.    Interval Problem Update  Reviewed last 24 hour events:  POD #1 I&D BKA   Tmax 99.3  SR 80s  SBP 110s to 120s - MAP goal > 65. Levo weaned off @ 0845 -weaning steroids  Neuro: Alert/oriented x4.  No focal neuro deficits  2L NC  Regular Diet  Rivera  I/O: 3000/3900  PIV, RIJ TLC -okay to keep TLC at transferred to orthopedic floor  Blood cultures (+) MRSA and MSSA - Linezolid to Vanco overnight -ID consulted  PPI  Mobility 3B    No longer requires ICU care.  Transfer to Orthopedic floor.  Discussed with Hospitalist team, who will assume care.  Critical care service is available for any questions or concerns.    Review of Systems  Review of Systems   Constitutional:  Negative for chills and fever.   HENT:  Negative for sore throat.    Eyes:  Negative for blurred vision and double vision.   Respiratory:  Negative for cough and shortness of breath.    Gastrointestinal:  Negative for nausea and vomiting.   Musculoskeletal:  Positive for joint pain. Negative for falls.   Neurological:  Negative for dizziness, speech change and headaches.   Psychiatric/Behavioral:  Negative for hallucinations. The patient has  insomnia. The patient is not nervous/anxious.    All other systems reviewed and are negative.       Vital Signs for last 24 hours   Pulse:  [76-99] 85  Resp:  [9-40] 16  BP: ()/(50-77) 112/58  SpO2:  [94 %-100 %] 97 %    Hemodynamic parameters for last 24 hours       Respiratory Information for the last 24 hours       Physical Exam   Physical Exam  Vitals and nursing note reviewed.   Constitutional:       General: He is not in acute distress.     Appearance: Normal appearance. He is ill-appearing. He is not toxic-appearing.   HENT:      Head: Normocephalic.      Right Ear: Hearing normal.      Left Ear: Hearing normal.      Nose: Nose normal.      Mouth/Throat:      Lips: Pink.      Mouth: Mucous membranes are moist.   Cardiovascular:      Rate and Rhythm: Normal rate and regular rhythm.      Pulses: Normal pulses.      Heart sounds: Normal heart sounds.   Pulmonary:      Effort: Pulmonary effort is normal.      Breath sounds: Normal breath sounds. No wheezing or rhonchi.   Abdominal:      General: Bowel sounds are normal.      Palpations: Abdomen is soft.      Tenderness: There is no abdominal tenderness. There is no guarding or rebound.   Musculoskeletal:      Right lower leg: No edema.      Left lower leg: No edema.      Right foot: Deformity and Charcot foot present.      Comments: Left BKA      Left Lower Extremity: Left leg is amputated below knee.   Skin:     General: Skin is warm and dry.      Capillary Refill: Capillary refill takes less than 2 seconds.   Neurological:      Mental Status: He is alert.      GCS: GCS eye subscore is 4. GCS verbal subscore is 5. GCS motor subscore is 6.      Motor: Motor function is intact.   Psychiatric:         Mood and Affect: Mood normal.         Behavior: Behavior is cooperative.         Cognition and Memory: Cognition normal.         Judgment: Judgment normal.         Medications  Current Facility-Administered Medications   Medication Dose Route Frequency Provider  Last Rate Last Admin    vancomycin (Vancocin) 1,500 mg in  mL IVPB  1,500 mg Intravenous Q12HR Richard Gonzalez M.D.        morphine (MS IR) tablet 30 mg  30 mg Oral Q6HRS Estefanía Cuevas, A.P.R.N.   30 mg at 10/20/23 1206    hydrocortisone sodium succinate PF (Solu-CORTEF) 100 MG injection 50 mg  50 mg Intravenous Q12HRS Estefanía Cuevas, A.P.R.N.        Followed by    [START ON 10/22/2023] hydrocortisone sodium succinate PF (Solu-CORTEF) 100 MG injection 50 mg  50 mg Intravenous DAILY Estefanía Cuevas, A.P.R.N.        zolpidem (Ambien) tablet 5 mg  5 mg Oral HS PRN Estefanía Cuevas, A.P.R.N.        omeprazole (PriLOSEC) capsule 20 mg  20 mg Oral BID Rasheed Dent M.D.   20 mg at 10/20/23 0612    senna-docusate (Pericolace Or Senokot S) 8.6-50 MG per tablet 2 Tablet  2 Tablet Oral BID Estefanía Cuevas, A.P.R.N.   2 Tablet at 10/20/23 0612    And    polyethylene glycol/lytes (Miralax) PACKET 1 Packet  1 Packet Oral QDAY PRN Estefanía Cuevas, A.P.R.N.        And    magnesium hydroxide (Milk Of Magnesia) suspension 30 mL  30 mL Oral QDAY PRN Estefanía Cuevas, A.P.R.N.        And    bisacodyl (Dulcolax) suppository 10 mg  10 mg Rectal QDAY PRN Estefanía Cuevas, A.P.R.N.        acetaminophen (Tylenol) tablet 650 mg  650 mg Oral Q4HRS PRN Estefanía Cuevas, A.P.R.N.        HYDROmorphone (Dilaudid) injection 1 mg  1 mg Intravenous Q2HRS PRN Estefanía Cuevas, A.P.R.N.   1 mg at 10/20/23 0841    MD Alert...ICU Electrolyte Replacement per Pharmacy   Other PHARMACY TO DOSE Estefanía Cuevas, A.P.R.N.        morphine ER (Ms Contin) tablet 15 mg  15 mg Oral Q12HRS JESSICA Foss.P.R.N.   15 mg at 10/20/23 0612    pregabalin (Lyrica) capsule 150 mg  150 mg Oral TID JESSICA Foss.P.R.N.   150 mg at 10/20/23 0842    DULoxetine (Cymbalta) capsule 60 mg  60 mg Oral DAILY JESSICA Foss.P.R.N.   60 mg at 10/20/23 0611    MD Alert...Vancomycin per Pharmacy   Other PHARMACY TO DOSE Roxanna Mcintyre           Fluids    Intake/Output Summary  (Last 24 hours) at 10/20/2023 1216  Last data filed at 10/20/2023 1208  Gross per 24 hour   Intake 2605.71 ml   Output 1635 ml   Net 970.71 ml       Laboratory  Recent Labs     10/19/23  0011   WVDNQ27B 7.37*   LAUDBQ572F 44.2*   VUAPC817W 96.7*   BMUW7VQR 96.2   ARTHCO3 25   I9RINABSB 10L   ARTBE 0         Recent Labs     10/18/23  2050 10/18/23  2326 10/19/23  0130 10/20/23  0326   SODIUM 138 134*  --  141   POTASSIUM 4.2 3.9  --  4.0   CHLORIDE 100 100  --  106   CO2 27 26  --  27   BUN 17 19  --  19   CREATININE 0.96 0.93  --  0.81   MAGNESIUM  --   --  1.7 2.4   PHOSPHORUS  --   --  2.7  --    CALCIUM 8.2* 7.5*  --  7.7*     Recent Labs     10/18/23  2050 10/18/23  2326 10/20/23  0326   ALTSGPT 8 12 12   ASTSGOT 10* 23 15   ALKPHOSPHAT 119* 115* 121*   TBILIRUBIN 0.6 0.5 0.3   GLUCOSE 73 79 156*     Recent Labs     10/18/23  2050 10/18/23  2326 10/19/23  0130 10/19/23  0308 10/20/23  0326   WBC 29.7* 32.9* 34.9* 36.2* 21.6*   NEUTSPOLYS 86.60* 85.50* 86.50* 86.40* 94.60*   LYMPHOCYTES 8.50* 8.60* 7.80* 7.80* 2.20*   MONOCYTES 3.70 3.90 3.60 3.80 2.30   EOSINOPHILS 0.00 0.20 0.00 0.00 0.00   BASOPHILS 0.10 0.20 0.20 0.20 0.10   ASTSGOT 10* 23  --   --  15   ALTSGPT 8 12  --   --  12   ALKPHOSPHAT 119* 115*  --   --  121*   TBILIRUBIN 0.6 0.5  --   --  0.3     Recent Labs     10/19/23  0130 10/19/23  0308 10/20/23  0326   RBC 3.65* 3.77* 3.59*   HEMOGLOBIN 10.4* 10.7* 10.3*   HEMATOCRIT 33.2* 34.9* 33.0*   PLATELETCT 249 258 212   PROTHROMBTM 15.9*  --   --    INR 1.25*  --   --        Imaging  X-Ray:  I have personally reviewed the images and compared with prior images.    Assessment/Plan  * Septic shock (HCC)- (present on admission)  Assessment & Plan  This is Sepsis Present on admission  SIRS criteria identified on my evaluation include: Fever, with temperature greater than 100.9 deg F, Tachycardia, with heart rate greater than 90 BPM and Leukocytosis, with WBC greater than 12,000  Clinical indicators of end  "organ dysfunction include Hypotension with systolic blood pressure less than 90 or MAP less than 65, Lactic Acid greater than 2 and Toxic Metabolic Encephalopathy  Source is LLE BKA stump abscess  Sepsis protocol initiated  Crystalloid Fluid Administration: Fluid resuscitation ordered per standard protocol - 30 mL/kg per current or ideal body weight  IV antibiotics as appropriate for source of sepsis  Reassessment: I have reassessed the patient's hemodynamic status.  Vasopressors weaned off today   Blood cultures positive MRSA/MSSA.  Repeating.  ID consulted.  Will see today  Weaning steroids    Acquired planovalgus deformity of right foot- (present on admission)  Assessment & Plan  -plan for OR 10/15    Chronic diastolic heart failure with preserved ejection fraction (HCC)- (present on admission)  Assessment & Plan  -most recent ECHO EF 55%  -holding off on ACE/ARB/BB while on pressors for hypotension  -Lasix as tolerated  -daily weight  -strict I&O    MRSA bacteremia  Assessment & Plan  -Repeating blood cultures  -Infectious disease consult  -Continue antibiotics  -ECHO    Chronic pain syndrome- (present on admission)  Assessment & Plan  -see \"chronic, continuous use of opioids\"    Chronic, continuous use of opioids- (present on admission)  Assessment & Plan  -PDMP reviewed. He receives 120 (30mg) MS IR/month and 60 (15mg) MS ER/month - we will restart this post-op  -PRN IV Dilaudid for severe break through   -bowel protocol      Rheumatoid arthritis (HCC)- (present on admission)  Assessment & Plan  -history of - on Humira as OP    MONICA (obstructive sleep apnea)- (present on admission)  Assessment & Plan  -history of  -has failed to FU OP for sleep study  -Declines nocturnal CPAP while hospitalized    Insomnia  Assessment & Plan  -on Ambien as OP -he is requesting it be restarted today    Mood disorder (HCC)- (present on admission)  Assessment & Plan  -on Cymbalta as OP         VTE:  Contraindicated  Ulcer: " PPI  Lines: Central Line  Ongoing indication addressed and Rivera Catheter  Ongoing indication addressed    I have performed a physical exam and reviewed and updated ROS and Plan today (10/20/2023). In review of yesterday's note (10/19/2023), there are no changes except as documented above.     Discussed patient condition and risk of morbidity and/or mortality with Hospitalist, RN, RT, Pharmacy, Dietary, , Patient, and my attending Dr. Gonzalez    Please note that this dictation was created using voice recognition software. I have made every reasonable attempt to correct obvious errors, but there may be errors of grammar and possibly content that I did not discover before finalizing the note.    JOSIANE Foss.

## 2023-10-20 NOTE — ASSESSMENT & PLAN NOTE
He is followed by [facility].  He is on morphine immediate release 30 mg 4 times a day and morphine sustained release 15 mg twice daily which will be restarted.  Continue Lyrica and Cymbalta.  Start oxycodone for moderate breakthrough pain and IV Dilaudid for severe breakthrough pain.

## 2023-10-20 NOTE — DISCHARGE PLANNING
Case Management Discharge Planning    Admission Date: 10/18/2023  GMLOS: 11.1  ALOS: 2    6-Clicks ADL Score: 7  6-Clicks Mobility Score: 6  PT and/or OT Eval ordered: No  Post-acute Referrals Ordered: No    Anticipated Discharge Dispo: Discharge Disposition: Discharged to home/self care (01)    Per chart review pt resides in [place], [place]    Family support:  Katiana Hubbard Spouse   [phone]   Priscila Hubbard Mother [phone]     Current Insurance on file: [facility]    DME Needed: pending hospital course    Action(s) Taken: chart reviewed    Escalations Completed: None    Medically Clear: No    Next Steps: f/u with pt and medical team to discuss dc needs and barriers.    Barriers to Discharge: Medical clearance /Specialty Clearance    Is the patient up for discharge tomorrow: No

## 2023-10-20 NOTE — CONSULTS
Hospital Medicine Consultation    Date of Service  10/20/2023    Referring Physician  Richard Gonzalez M.D.    Consulting Physician  Carlos Eduardo Andrade M.D.    Reason for Consultation  sepsis    History of Presenting Illness  48 y.o. male who presented 10/18/2023 with fever and infected lower extremity stump.  Mr. Hubbard has a past medical history of rheumatoid arthritis and recent below the knee amputation that was most recently admitted to [facility] then went to inpatient rehab 8/20/2023 through 9/5/2023 after work-up of congestive heart failure with preserved ejection fraction.  10/18/2023 he scented to the emergency room with altered level of consciousness and lethargy.  In the emergency room he was found to have a white blood cell count of 30,000 and a CT lower extremity revealed a enhancing fluid collection at the distal soft tissues at the below the knee amputation consistent with infection and osteomyelitis.  He was admitted in guarded condition to the intensive care unit for IV fluids, broad-spectrum IV antibiotics and management of septic shock.  On 10/19/2023 he underwent left below the knee amputation incision and drainage of an abscess with wound VAC placement.  Blood cultures are positive for MSSA x2.    Review of Systems  Review of Systems   Constitutional:  Negative for chills and fever.   Respiratory:  Negative for shortness of breath.    Gastrointestinal:         Tolerating a diet       Past Medical History   has a past medical history of ADD (attention deficit disorder), Alcohol abuse, Allergic rhinitis (5/21/2009), Anemia (09/2019), Anxiety (5/21/2009), Apnea, sleep, Arrhythmia, Back pain (5/21/2009), Bipolar disorder ([facility]), Bleeding ulcer (5/21/2009), Bleeding ulcer (5/21/2009), Bowel habit changes, Daytime sleepiness, Depression (5/21/2009), Eczema (5/21/2009), GERD (gastroesophageal reflux disease), Heart burn, Heart murmur, Hemorrhagic disorder (Former[facility]), History of bleeding ulcers (2/12/2015),  Hypertension (05/03/2021), Insomnia (5/21/2009), Migraine (5/21/2009), Morning headache, Obesity, morbid (HCC) (5/21/2009), Osteomyelitis (HCC) (10/7/2019), Pain (09/2019), Pubic ramus fracture (HCC) (9/28/2019), Rheumatoid arteritis ([facility]) (09/2019), Sleep apnea (5/21/2009), and Snoring.    He has no past medical history of Hyperlipidemia.    Surgical History   has a past surgical history that includes gastric bypass laparoscopic (2000); irrigation & debridement ortho (Left, 10/9/2019); other surgical procedure; other surgical procedure (2019); pr total knee arthroplasty (Right, 7/22/2020); cholecystectomy (2000); tendon transfer (Right, 1/22/2020); other orthopedic surgery (Right, 2020); knee amputation below (Left, 3/9/2023); and irrigation & debridement general (Left, 10/19/2023).    Family History  family history includes Arthritis in his mother; Cancer in his father and maternal grandmother; Depression in his mother; Heart Disease in his maternal grandmother; Hypertension in his mother; Psychiatric Illness in an other family member; Schizophrenia in his father; Stroke in his maternal aunt.    Social History   reports that he has been smoking cigarettes and cigars. He has been exposed to tobacco smoke. He has never used smokeless tobacco. He reports that he does not drink alcohol and does not use drugs.    Medications  Prior to Admission Medications   Prescriptions Last Dose Informant Patient Reported? Taking?   Adalimumab (HUMIRA PEN) 40 MG/0.4ML Pen-injector Kit 2 WEEKS AGO at [facility] Patient No No   Sig: INJECT 40MG SUBCUTANEOUSLY  EVERY 2 WEEKS   DULoxetine (CYMBALTA) 60 MG Cap DR Particles delayed-release capsule 2 DAYS AGO at [facility] Patient No No   Sig: Take 1 Capsule by mouth every day.   MOVANTIK 25 MG Tab 2 DAYS AGO at [facility] Patient Yes No   Sig: Take 25 mg by mouth every day.   Naloxone (NARCAN) 4 MG/0.1ML Liquid PRN at [facility] Patient No No   Sig: One spray in one nostril for overdose and call 911.   acetaminophen  (TYLENOL) 500 MG Tab 2 DAYS AGO at [facility] Patient No No   Sig: Take 2 Tablets by mouth in the morning, at noon, and at bedtime.   albuterol 108 (90 Base) MCG/ACT Aero Soln inhalation aerosol >MONTH at [facility] Patient No No   Sig: Inhale 2 Puffs every 6 hours as needed for Shortness of Breath.   dapagliflozin propanediol (FARXIGA) 10 MG Tab 2 DAYS AGO at [facility] Patient No No   Sig: Take 1 Tablet by mouth every day for 90 days.   docusate sodium (COLACE) 100 MG Cap 2 DAYS AGO at [facility] Patient Yes No   Sig: Take 200 mg by mouth every day.   furosemide (LASIX) 20 MG Tab 2 DAYS AGO at [facility] Patient No No   Sig: Take 1 Tablet by mouth 2 times a day as needed (water retention). Take 1 tablet daily and may take additional 1 tablet as need for increase in weight by 3 lb/ water retention.   lidocaine (LIDODERM) 5 % Patch COUPLE WEEKS AGO at [facility] Patient No No   Sig: Place 2 Patches on the skin every 24 hours.   morphine (MS IR) 30 MG tablet 2 DAYS AGO at [facility] Patient Yes No   Sig: Take 30 mg by mouth 4 times a day.   morphine ER (MS CONTIN) 15 MG Tab CR tablet 2 DAYS AGO at [facility] Patient Yes No   Sig: Take 15 mg by mouth 2 times a day.   multivitamin Tab 2 DAYS AGO at [facility] Patient Yes No   Sig: Take 1 Tablet by mouth every day.   omeprazole (PRILOSEC) 20 MG delayed-release capsule 2 DAYS AGO at [facility] Patient Yes Yes   Sig: Take 20 mg by mouth every day.   potassium chloride SA (K-DUR) 10 MEQ Tab CR 2 DAYS AGO at [facility] Patient No No   Sig: Take 1 Tablet by mouth every day for 90 days.   predniSONE (DELTASONE) 10 MG Tab 2 DAYS AGO at [facility] Patient No No   Sig: Take 1 Tablet by mouth 2 times a day.   pregabalin (LYRICA) 150 MG Cap 2 DAYS AGO at [facility] Patient Yes Yes   Sig: Take 150 mg by mouth in the morning, at noon, and at bedtime.   vitamin D3 (CHOLECALCIFEROL) 1000 Unit (25 mcg) Tab 2 DAYS AGO at [facility] Patient No No   Sig: Take 1 Tablet by mouth every day.   zolpidem (AMBIEN) 5 MG Tab 2 DAYS AGO at [facility] Patient No No   Sig: Take 1 Tablet by mouth at  "bedtime as needed for Sleep for up to 30 days.      Facility-Administered Medications: None       Allergies  Allergies   Allergen Reactions    Nsaids      Bleeding, \"I had a bleeding ulcer\"    Diphenhydramine Unspecified     \"I get agitated\"    Mirtazapine Unspecified     I had a hard time waking up, lacking mental focus.     Penicillins      \"Had some dizziness\"  Tolerated Unasyn 10/2019  Tolerated Zosyn 02/2023    Promethazine Unspecified     \"I get very agitated\"       Physical Exam  Pulse:  [76-99] 85  Resp:  [9-40] 16  BP: ()/(50-77) 112/58  SpO2:  [94 %-100 %] 97 %    Physical Exam  Vitals and nursing note reviewed.   Constitutional:       Appearance: He is ill-appearing.   HENT:      Mouth/Throat:      Mouth: Mucous membranes are dry.   Neck:      Comments: Right IJ central line  Cardiovascular:      Rate and Rhythm: Normal rate and regular rhythm.      Heart sounds: Murmur heard.   Pulmonary:      Effort: Pulmonary effort is normal.      Breath sounds: Normal breath sounds.   Abdominal:      General: There is no distension.      Tenderness: There is no abdominal tenderness.      Comments: Ventral hernia   Genitourinary:     Comments: dorothea  Musculoskeletal:      Comments: Right Charcot foot  Left BKA covered     Neurological:      General: No focal deficit present.      Mental Status: He is alert and oriented to person, place, and time.   Psychiatric:         Mood and Affect: Mood normal.         Behavior: Behavior normal.         Fluids  Date 10/20/23 0700 - 10/21/23 0659   Shift 4813-2799 9157-4997 5109-0526 24 Hour Total   INTAKE   I.V. 18.7   18.7   Shift Total 18.7   18.7   OUTPUT   Urine 120   120   Shift Total 120   120   Weight (kg) 104.3 104.3 104.3 104.3       Laboratory  Recent Labs     10/19/23  0130 10/19/23  0308 10/20/23  0326   WBC 34.9* 36.2* 21.6*   RBC 3.65* 3.77* 3.59*   HEMOGLOBIN 10.4* 10.7* 10.3*   HEMATOCRIT 33.2* 34.9* 33.0*   MCV 91.0 92.6 91.9   MCH 28.5 28.4 28.7   MCHC " 31.3* 30.7* 31.2*   RDW 58.4* 59.7* 58.1*   PLATELETCT 249 258 212   MPV 9.6 9.2 9.3     Recent Labs     10/18/23  2050 10/18/23  2326 10/20/23  0326   SODIUM 138 134* 141   POTASSIUM 4.2 3.9 4.0   CHLORIDE 100 100 106   CO2 27 26 27   GLUCOSE 73 79 156*   BUN 17 19 19   CREATININE 0.96 0.93 0.81   CALCIUM 8.2* 7.5* 7.7*     Recent Labs     10/19/23  0130   INR 1.25*                 Imaging  DX-CHEST-PORTABLE (1 VIEW)   Final Result         1.  Pulmonary edema and/or infiltrates are identified, which appear somewhat increased since the prior exam.   2.  Cardiomegaly      CT-EXTREMITY, LOWER WITH LEFT   Final Result         1.  Enhancing fluid collection at the distal soft tissues at below the knee amputation, appearance most compatible with abscess. Appears to communicate with the skin surface.   2.  Suspected abscess abuts the distal tibial stump concerning for changes of osteomyelitis.   3.  Diffuse subcutaneous fat stranding, compatible changes of edema and/or cellulitis.      CT-ABDOMEN-PELVIS WITH   Final Result         1.  4.6 cm mid abdominal hernia containing a portion of transverse without visualized obstructive changes   2.  Bilateral pulmonary infiltrates.   3.  Moderate right and trace left pleural effusion   4.  Diffuse subcutaneous fat stranding, appearance favoring changes of anasarca   5.  Atherosclerosis and atherosclerotic coronary artery disease      DX-TIBIA AND FIBULA LEFT   Final Result         1.  No acute traumatic bony injury.   2.  Soft tissue gas at the distal amputation site anteriorly, appearance suggests open wound and/or underlying infectious etiology. Correlate with exam.      DX-CHEST-PORTABLE (1 VIEW)   Final Result         1.  Pulmonary edema and/or infiltrates.   2.  Trace right pleural effusion   3.  Cardiomegaly      EC-ECHOCARDIOGRAM COMPLETE W/ CONT    (Results Pending)       Assessment/Plan  * Septic shock (HCC)- (present on admission)  Assessment & Plan  This is Septic shock  Present on admission  SIRS criteria identified on my evaluation include: Leukocytosis, with WBC greater than 12,000  Clinical indicators of end organ dysfunction include Lactic Acid greater than 2  Indicators of septic shock include: Sepsis present and initial lactate level result is greater than or equal to 4mmol/L he required intravenous pressors  Sources is: Staph aureus bacteremia  Sepsis protocol initiated  Crystalloid Fluid Administration: Was given  IV antibiotics as appropriate for source of sepsis  Reassessment: I have reassessed the patient's hemodynamic status  Tapering IV hydrocortisone    Abscess of left lower extremity- (present on admission)  Assessment & Plan  Status post debridement on 10/19 and he will require further debridement by ortho.    Bacteremia due to Staphylococcus aureus- (present on admission)  Assessment & Plan  MSSA bacteremia with blood cultures x2 positive  Echocardiogram has been ordered  Infectious disease has been consulted    Acquired planovalgus deformity of right foot- (present on admission)  Assessment & Plan  Charcot foot on the right side    Chronic diastolic heart failure with preserved ejection fraction (HCC)- (present on admission)  Assessment & Plan  Monitor for volume overload  He had been on lasix as an outpatient    Chronic pain syndrome- (present on admission)  Assessment & Plan  He is followed by [facility].  He is on morphine immediate release 30 mg 4 times a day and morphine sustained release 15 mg twice daily which will be restarted.  Continue Lyrica and Cymbalta.  Start oxycodone for moderate breakthrough pain and IV Dilaudid for severe breakthrough pain.    Rheumatoid arthritis (HCC)- (present on admission)  Assessment & Plan  He has been on Humira in the past

## 2023-10-20 NOTE — PROGRESS NOTES
Notified provider, Jyothi, that pt's surgical site on the left lower extremity started to have sanguinous drainage surrounding the wound vac. Wound team at bedside. Per provider okay to take the wound vac off, and apply a pressure dressing to the wound. Dressing applied per Wound Team. Sent provider an image of what the wound looks like with wound vac off, and provider states that it looks appropriate.

## 2023-10-20 NOTE — OP REPORT
DATE OF OPERATION: 10/19/2023     PREOPERATIVE DIAGNOSIS:  Left below-knee amputation residual limb abscess    POSTOPERATIVE DIAGNOSIS: Same    PROCEDURE PERFORMED:   Incision and drainage left below-knee amputation abscess  Wound VAC placement 8 x 4 cm    SURGEON: Jay Roe M.D.     ASSISTANT: None    ANESTHESIA: General    SPECIMEN: None    ESTIMATED BLOOD LOSS: 50 mL    IMPLANTS: None    INDICATIONS: The patient is a 48 y.o. male who presented with increased pain and swelling of his left below-knee amputation site with an open wound consistent with abscess formation.  I discussed the risks and benefits of the above procedure which include but are not limited to risks of infection, wound healing complication, neurovascular injury, pain, malunion, non-union, malrotation, and the medical risks of anesthesia including MI, stroke, and death.  Alternatives to surgery were also discussed, including non-operative management, which I did not recommend.  The patient and/or their POA was in agreement with the plan to proceed, and the informed consent was signed and documented.    DESCRIPTION OF PROCEDURE:  Patient was seen in the preoperative holding area on the day of surgery and marked on the operative site which was the left leg below-knee amputation site. They were transported to the operating room and positioned supine on the operating table.  Care was taken to pad any bony prominences and prominent neurovascular structures.  Anesthesia was induced.  The operative extremity was prescrubbed with a CHG brush followed by an alcohol bath and then prepped and draped in the usual sterile fashion.  A time out was performed in which the correct patient, site, side, procedure, and surgeon's initials on extremity were confirmed by all operating personnel.     We then turned our attention to the left leg.  I began by incising his previous incision and encountered immediate purulence.  I sharply excised the area of the  open wound that appeared to be necrotic over the lateral side using a knife.  I then incised and spread down and expose the area of abscess which was primarily superficial.  I did take the muscle flap down to the bone to inspect the bone.  There was significant amounts of fibrous tissue covering the end of the bone which was excisionally debrided sharply using a rongeur.  All tissue appeared healthy.  I then irrigated the wound thoroughly with 3 L of sterile saline.  I then inspected the wound and achieved hemostasis with Bovie electrocautery.  I then placed a wound VAC sponge into the wound and achieved good seal in the wound VAC.  The patient was then awoken from anesthesia without immediate complication transferred to the PACU in stable condition.    POSTOPERATIVE PLAN:      Inpatient plan: PACU to floor.  Continue antibiotics per the primary team.Plan for repeat debridement and likely closure in 48 hours   Weightbearing status: Nonweightbearing left leg  DVT prophylaxis: Per primary  Outpatient plan: Pending definitive closure    ____________________________________   Jay Roe M.D.   DD: 10/19/2023  6:50 PM

## 2023-10-20 NOTE — CONSULTS
Consults  INFECTIOUS DISEASES INPATIENT CONSULT NOTE     Date of Service: 10/20/2023    Consult Requested By: Carlos Eduardo Andrade M.D.    Reason for Consultation: MSSA bacteremia and BKA stump infection    History of Present Illness:   Richard Hubbard II is a 48 y.o.  admitted 10/18/2023. Pt has a past medical history of rheumatoid arthritis with rheumatoid nodules on Humira per notes.  Patient also with history of chronic bilateral foot ulcers ongoing since 2022.  He was last seen by ID in 3/23 for group A strep bacteremia at leg tenosynovitis with left foot osteomyelitis.  He underwent left BKA on 3/9/2023.  He presented complaining of altered mentation and fevers.    Hospital Course:   Febrile to 102.2 on arrival, leukocytosis on arrival and ongoing.  CT abdomen pelvis relatively unremarkable but lower lung bases with infiltrates.  CT leg with enhancing fluid collection below-knee amputation, compatible with abscess.  Abscess abuts the distal tibial stump concerning for changes of osteomyelitis.    Review Of Systems:  Review of Systems   Constitutional:  Positive for malaise/fatigue. Negative for chills and fever.   HENT:  Negative for hearing loss.    Eyes:  Negative for blurred vision and double vision.   Respiratory:  Negative for cough, sputum production and shortness of breath.    Cardiovascular:  Negative for chest pain and leg swelling.   Gastrointestinal:  Negative for abdominal pain, constipation, diarrhea, nausea and vomiting.   Genitourinary:  Negative for dysuria.   Musculoskeletal:  Positive for back pain, myalgias and neck pain.   Skin:  Negative for rash.   Neurological:  Negative for weakness.   Psychiatric/Behavioral:  The patient is not nervous/anxious.        PMH:   Past Medical History:   Diagnosis Date    ADD (attention deficit disorder)     Alcohol abuse     Allergic rhinitis 5/21/2009    Anemia 09/2019    Anxiety 5/21/2009    Apnea, sleep     Arrhythmia     afib when hospitaized for  infection    Back pain 5/21/2009    Bipolar disorder ([facility])     Bleeding ulcer 5/21/2009    Bleeding ulcer 5/21/2009    History of anemia-now on nexium     Bowel habit changes     constipation related to narcotics    Daytime sleepiness     Depression 5/21/2009    Eczema 5/21/2009    GERD (gastroesophageal reflux disease)     Heart burn     on meds    Heart murmur     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    Hemorrhagic disorder (HCC)     hx of bleeding ulcers    History of bleeding ulcers 2/12/2015    Hypertension 05/03/2021    pt states well controlled on meds    Insomnia 5/21/2009    Migraine 5/21/2009    Morning headache     Obesity, morbid ([facility]) 5/21/2009    Osteomyelitis ([facility]) 10/7/2019    Pain 09/2019    Chronic knee and back pain    Pubic ramus fracture ([facility]) 9/28/2019    Rheumatoid arteritis ([facility]) 09/2019    knee, feet right hand    Sleep apnea 5/21/2009    Did not tolerate cpap, does not use it    Snoring        PSH:  Past Surgical History:   Procedure Laterality Date    IRRIGATION & DEBRIDEMENT GENERAL Left 10/19/2023    Procedure: IRRIGATION AND DEBRIDEMENT, WOUND;  Surgeon: Jay Roe M.D.;  Location: [facility];  Service: Trauma    KNEE AMPUTATION BELOW Left 3/9/2023    Procedure: AMPUTATION, BELOW KNEE;  Surgeon: Wayne Chambers M.D.;  Location: [facility];  Service: Orthopedics    PB TOTAL KNEE ARTHROPLASTY Right 7/22/2020    Procedure: ARTHROPLASTY, KNEE, TOTAL;  Surgeon: Temo Pires M.D.;  Location: [facility];  Service: Orthopedics    TENDON TRANSFER Right 1/22/2020    Procedure: RIGHT DISTAL ULNA RESECTION AND EXTENSOR TENDON TRANSFER;  Surgeon: Marine Rushing M.D.;  Location: [facility];  Service: Orthopedics    OTHER ORTHOPEDIC SURGERY Right 2020    total right knee replacement    IRRIGATION & DEBRIDEMENT ORTHO Left 10/9/2019    Procedure: IRRIGATION AND DEBRIDEMENT, WOUND - PELVIC OSTEOMYELITIS;  Surgeon: You Olivares M.D.;   Location: [facility];  Service: Orthopedics    OTHER SURGICAL PROCEDURE  2019    osteomelitis of pelvis cleaned    GASTRIC BYPASS LAPAROSCOPIC      Lucila en y    CHOLECYSTECTOMY      OTHER SURGICAL PROCEDURE      repair of broken penis       FAMILY HX:  Family History   Problem Relation Age of Onset    Hypertension Mother     Arthritis Mother     Depression Mother     Cancer Father         bladder    Schizophrenia Father     Cancer Maternal Grandmother         breast    Heart Disease Maternal Grandmother     Psychiatric Illness Other     Stroke Maternal Aunt     Other Neg Hx         no connective tissue disorders or known aortic disease     Reviewed family history. No pertinent family history.     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   Tobacco Use    Smoking status: Some Days     Current packs/day: 0.00     Types: Cigarettes, Cigars     Last attempt to quit: 2023     Years since quittin.7     Passive exposure: Current    Smokeless tobacco: Never    Tobacco comments:     occasional cigar   Vaping Use    Vaping Use: Never used   Substance and Sexual Activity    Alcohol use: No     Alcohol/week: 0.0 oz     Comment: quit  ago- past ETOH abuse    Drug use: No    Sexual activity: Yes     Partners: Female     Comment: ;    Other Topics Concern     Service No    Blood Transfusions Yes    Caffeine Concern No    Occupational Exposure No    Hobby Hazards No    Sleep Concern No    Stress Concern Yes    Weight Concern Yes    Special Diet No    Back Care No    Exercise No    Bike Helmet No    Seat Belt Yes    Self-Exams Yes   Social History Narrative    , lives in [place]     Social Determinants of Health     Financial  Resource Strain: Low Risk  (2023)    Overall Financial Resource Strain (CARDIA)     Difficulty of Paying Living Expenses: Not very hard   Food Insecurity: No Food Insecurity (2023)    Hunger Vital Sign     Worried About Running Out of Food in the Last Year: Never true     Ran Out of Food in the Last Year: Never true   Transportation Needs: Unmet Transportation Needs (2023)    PRAPARE - Transportation     Lack of Transportation (Medical): Yes     Lack of Transportation (Non-Medical): Yes   Physical Activity: Sufficiently Active (2023)    Exercise Vital Sign     Days of Exercise per Week: 7 days     Minutes of Exercise per Session: 30 min   Stress: Stress Concern Present (2023)    [place] [employer] - Occupational Stress Questionnaire     Feeling of Stress : Rather much   Social Connections: Socially Integrated (2023)    Social Connection and Isolation Panel [NHANES]     Frequency of Communication with Friends and Family: More than three times a week     Frequency of Social Gatherings with Friends and Family: Twice a week     Attends Nondenominational Services: More than 4 times per year     Active Member of Clubs or Organizations: Yes     Attends Club or Organization Meetings: More than 4 times per year     Marital Status:    Intimate Partner Violence: Not At Risk (2023)    Humiliation, Afraid, Rape, and Kick questionnaire     Fear of Current or Ex-Partner: No     Emotionally Abused: No     Physically Abused: No     Sexually Abused: No   Housing Stability: Low Risk  (2023)    Housing Stability Vital Sign     Unable to Pay for Housing in the Last Year: No     Number of Places Lived in the Last Year: 1     Unstable Housing in the Last Year: No     Social History     Tobacco Use   Smoking Status Some Days    Current packs/day: 0.00    Types: Cigarettes, Cigars    Last attempt to quit: 2023    Years since quittin.7    Passive exposure: Current   Smokeless  "Tobacco Never   Tobacco Comments    occasional cigar     Social History     Substance and Sexual Activity   Alcohol Use No    Alcohol/week: 0.0 oz    Comment: quit 2011 ago- past ETOH abuse       Allergies/Intolerances:  Allergies   Allergen Reactions    Nsaids      Bleeding, \"I had a bleeding ulcer\"    Diphenhydramine Unspecified     \"I get agitated\"    Mirtazapine Unspecified     I had a hard time waking up, lacking mental focus.     Penicillins      \"Had some dizziness\"  Tolerated Unasyn 10/2019  Tolerated Zosyn 02/2023    Promethazine Unspecified     \"I get very agitated\"       History reviewed with the patient and /or family member, chart & primary care team    Other Current Medications:    Current Facility-Administered Medications:     vancomycin (Vancocin) 1,500 mg in  mL IVPB, 1,500 mg, Intravenous, Q12HR, Richard Gonzalez M.D., Last Rate: 125 mL/hr at 10/20/23 1306, 1,500 mg at 10/20/23 1306    morphine (MS IR) tablet 30 mg, 30 mg, Oral, Q6HRS, Estefanía Cuevas A.P.R.N., 30 mg at 10/20/23 1206    hydrocortisone sodium succinate PF (Solu-CORTEF) 100 MG injection 50 mg, 50 mg, Intravenous, Q12HRS **FOLLOWED BY** [START ON 10/22/2023] hydrocortisone sodium succinate PF (Solu-CORTEF) 100 MG injection 50 mg, 50 mg, Intravenous, DAILY, Estefanía Cuevas A.P.R.N.    zolpidem (Ambien) tablet 5 mg, 5 mg, Oral, HS PRN, Estefanía Cuevas A.P.R.N.    omeprazole (PriLOSEC) capsule 20 mg, 20 mg, Oral, BID, Rasheed Dent M.D., 20 mg at 10/20/23 0612    senna-docusate (Pericolace Or Senokot S) 8.6-50 MG per tablet 2 Tablet, 2 Tablet, Oral, BID, 2 Tablet at 10/20/23 0612 **AND** polyethylene glycol/lytes (Miralax) PACKET 1 Packet, 1 Packet, Oral, QDAY PRN **AND** magnesium hydroxide (Milk Of Magnesia) suspension 30 mL, 30 mL, Oral, QDAY PRN **AND** bisacodyl (Dulcolax) suppository 10 mg, 10 mg, Rectal, QDAY PRN, Estefanía Cuevas A.P.R.N.    acetaminophen (Tylenol) tablet 650 mg, 650 mg, Oral, Q4HRS PRN, Estefanía Cuevas, " "A.P.R.N.    HYDROmorphone (Dilaudid) injection 1 mg, 1 mg, Intravenous, Q2HRS PRN, JESSICA Foss.P.R.N., 1 mg at 10/20/23 1301    MD Alert...ICU Electrolyte Replacement per Pharmacy, , Other, PHARMACY TO DOSE, WOODY FossRMaryN.    morphine ER (Ms Contin) tablet 15 mg, 15 mg, Oral, Q12HRS, Estefanía Cuevas A.P.R.N., 15 mg at 10/20/23 0612    pregabalin (Lyrica) capsule 150 mg, 150 mg, Oral, TID, Estefanía Cuevas A.P.R.N., 150 mg at 10/20/23 1435    DULoxetine (Cymbalta) capsule 60 mg, 60 mg, Oral, DAILY, Estefanía Cuevas A.P.R.N., 60 mg at 10/20/23 0611    MD Alert...Vancomycin per Pharmacy, , Other, PHARMACY TO DOSE, Roxanna Mcintyre  [unfilled]    Most Recent Vital Signs:  /57   Pulse 83   Temp 36.6 °C (97.9 °F)   Resp 17   Ht 1.778 m (5' 10\")   Wt 104 kg (229 lb 15 oz)   SpO2 96%   BMI 32.99 kg/m²   Temp  Av.4 °C (99.3 °F)  Min: 35.9 °C (96.7 °F)  Max: 39 °C (102.2 °F)    Physical Exam:  Physical Exam  Constitutional:       Appearance: Normal appearance.   HENT:      Head: Normocephalic and atraumatic.      Right Ear: External ear normal.      Left Ear: External ear normal.      Nose: Nose normal.      Mouth/Throat:      Mouth: Mucous membranes are moist.      Pharynx: Oropharynx is clear.   Eyes:      Extraocular Movements: Extraocular movements intact.      Conjunctiva/sclera: Conjunctivae normal.      Pupils: Pupils are equal, round, and reactive to light.   Cardiovascular:      Rate and Rhythm: Normal rate and regular rhythm.      Heart sounds: Murmur heard.   Pulmonary:      Effort: Pulmonary effort is normal.      Comments: Crackles   Abdominal:      General: Abdomen is flat. Bowel sounds are normal.      Palpations: Abdomen is soft.   Musculoskeletal:      Cervical back: Normal range of motion and neck supple.      Comments: Right BKA site and surgical dressings, clean, dry and intact   Skin:     General: Skin is warm and dry.   Neurological:      General: No focal deficit " present.      Mental Status: He is alert and oriented to person, place, and time.   Psychiatric:         Mood and Affect: Mood normal.         Behavior: Behavior normal.           Pertinent Lab Results:  Recent Labs     10/19/23  0130 10/19/23  0308 10/20/23  0326   WBC 34.9* 36.2* 21.6*      Recent Labs     10/19/23  0130 10/19/23  0308 10/20/23  0326   HEMOGLOBIN 10.4* 10.7* 10.3*   HEMATOCRIT 33.2* 34.9* 33.0*   MCV 91.0 92.6 91.9   MCH 28.5 28.4 28.7   PLATELETCT 249 258 212         Recent Labs     10/18/23  2050 10/18/23  2326 10/20/23  0326   SODIUM 138 134* 141   POTASSIUM 4.2 3.9 4.0   CHLORIDE 100 100 106   CO2 27 26 27   CREATININE 0.96 0.93 0.81        Recent Labs     10/18/23  2050 10/18/23  2326 10/20/23  0326   ALBUMIN 3.3 2.7* 2.7*        Pertinent Micro:      Blood Culture Hold   Date Value Ref Range Status   10/20/2023 Collected  Final        Studies:  CT-EXTREMITY, LOWER WITH LEFT    Result Date: 10/18/2023    10/18/2023 10:12 PM HISTORY/REASON FOR EXAM:  Altered; Left stump abscess deep space infection. TECHNIQUE/EXAM DESCRIPTION AND NUMBER OF VIEWS:  CT scan of the LEFT lower extremity with contrast, with reconstructions. Thin helical 3 mm sections were obtained from the distal femur through the proximal tibia/fibula. Sagittal and coronal multiplanar reconstructions were generated from the axial images. A total of 100 mL of Omnipaque 350 nonionic contrast was administered  IV without complication. Up to date radiation dose reduction adjustments have been utilized to meet ALARA standards for radiation dose reduction. COMPARISON: February 28, 2023 FINDINGS: Limited views of the small bowel and colon appear unremarkable. The bladder appears within normal limits. There is bony demineralization noted. Left below the knee amputation is observed. There is 5.7 x 4.5 cm peripherally enhancing fluid collection identified at the distal margin of the tibial stump, associated foci of air are seen anteriorly  with probable anterior wound. Diffuse subcutaneous fat stranding is seen.     1.  Enhancing fluid collection at the distal soft tissues at below the knee amputation, appearance most compatible with abscess. Appears to communicate with the skin surface. 2.  Suspected abscess abuts the distal tibial stump concerning for changes of osteomyelitis. 3.  Diffuse subcutaneous fat stranding, compatible changes of edema and/or cellulitis.    DX-CHEST-PORTABLE (1 VIEW)    Result Date: 10/18/2023    10/18/2023 10:59 PM HISTORY/REASON FOR EXAM: line placement TECHNIQUE/EXAM DESCRIPTION:  Single AP view of the chest. COMPARISON: Today at 2120 FINDINGS: Right internal jugular central line is seen terminating within the right atrium.   Cardiomegaly is observed. The mediastinal contour appears within normal limits.  The central  pulmonary vasculature appears prominent and indistinct. Bilateral lung volumes are diminished.  Diffuse scattered hazy pulmonary parenchymal opacities are seen. No significant pleural effusions are identified. The bony structures appear age-appropriate.     1.  Pulmonary edema and/or infiltrates are identified, which appear somewhat increased since the prior exam. 2.  Cardiomegaly    CT-ABDOMEN-PELVIS WITH    Result Date: 10/18/2023    10/18/2023 10:12 PM HISTORY/REASON FOR EXAM:  Pain. TECHNIQUE/EXAM DESCRIPTION:   CT scan of the abdomen and pelvis with contrast. Contrast-enhanced helical scanning was obtained from the diaphragmatic domes through the pubic symphysis following the bolus administration of nonionic contrast without complication. 100 mL of Omnipaque 350 nonionic contrast was administered without complication. Low dose optimization technique was utilized for this CT exam including automated exposure control and adjustment of the mA and/or kV according to patient size. COMPARISON: No prior studies available. FINDINGS: Lower Chest: Scattered bilateral pulmonary consolidations are seen. Moderate  right and trace left pleural effusion is seen. Liver: Normal. Spleen: Unremarkable. Pancreas: Unremarkable. Gallbladder: No calcified stones. Biliary: Nondilated. Adrenal glands: Normal. Kidneys: Unremarkable without hydronephrosis. Bowel: No obstruction or acute inflammation. 4.6 cm mid abdominal hernia seen containing a portion of the transverse colon. Lymph nodes: No adenopathy. Vasculature: Atherosclerotic changes are seen including atherosclerotic coronary artery calcifications.. Peritoneum: Unremarkable without ascites. Musculoskeletal: No acute or destructive process. Diffuse subcutaneous fat stranding is seen. Pelvis: No adenopathy or free fluid.     1.  4.6 cm mid abdominal hernia containing a portion of transverse without visualized obstructive changes 2.  Bilateral pulmonary infiltrates. 3.  Moderate right and trace left pleural effusion 4.  Diffuse subcutaneous fat stranding, appearance favoring changes of anasarca 5.  Atherosclerosis and atherosclerotic coronary artery disease    DX-TIBIA AND FIBULA LEFT    Result Date: 10/18/2023  10/18/2023 9:28 PM HISTORY/REASON FOR EXAM: Atraumatic Pain/Swelling/Deformity; From the knee down please and thank you TECHNIQUE/EXAM DESCRIPTION:  AP and lateral views of the LEFT tibia and fibula. COMPARISON:  None FINDINGS: Postsurgical changes of below-the-knee amputation are seen. There is soft tissue gas identified at the distal site anteriorly.     1.  No acute traumatic bony injury. 2.  Soft tissue gas at the distal amputation site anteriorly, appearance suggests open wound and/or underlying infectious etiology. Correlate with exam.    DX-CHEST-PORTABLE (1 VIEW)    Result Date: 10/18/2023    10/18/2023 9:12 PM HISTORY/REASON FOR EXAM: possible sepsis. TECHNIQUE/EXAM DESCRIPTION:  Single AP view of the chest. COMPARISON: September 1, 2023 FINDINGS: Cardiomegaly is observed. The mediastinal contour appears within normal limits.  The central  pulmonary vasculature  appears prominent and indistinct. Bilateral lung volumes are diminished.  Diffuse scattered hazy pulmonary parenchymal opacities are seen. Mild blunting of the right costophrenic angle is seen suggesting small right effusion. The bony structures appear age-appropriate.     1.  Pulmonary edema and/or infiltrates. 2.  Trace right pleural effusion 3.  Cardiomegaly    DX-FOOT-2- RIGHT    Result Date: 10/10/2023  Independent review of the patient's x-rays of the right ankle and foot weightbearing taken today including AP, lateral and oblique views demonstrate significant and severe pes planovalgus deformity.  There is subfibular impingement and contact of the calcaneus adjacent to the distal fibula, subluxation of the subtalar as well as the talonavicular joints.  No acute fractures are noted.    DX-ANKLE 3+ VIEWS RIGHT    Result Date: 10/10/2023  Independent review of the patient's x-rays of the right ankle and foot weightbearing taken today including AP, lateral and oblique views demonstrate significant and severe pes planovalgus deformity.  There is subfibular impingement and contact of the calcaneus adjacent to the distal fibula, subluxation of the subtalar as well as the talonavicular joints.  No acute fractures are noted.      ASSESSMENT/PLAN:     48 y.o.  admitted 10/18/2023. Pt has a past medical history of RA on Humira per notes.  Patient also with history of chronic bilateral foot ulcers ongoing since 2022.  He was last seen by ID in 3/23 for group A strep bacteremia at leg tenosynovitis with left foot osteomyelitis.  He underwent left BKA on 3/9/2023.  He presented complaining of altered mentation and fevers.    Hospital Course:   Febrile to 102.2 on arrival, leukocytosis on arrival and ongoing.  CT abdomen pelvis relatively unremarkable but lower lung bases with infiltrates.  CT leg with enhancing fluid collection below-knee amputation, compatible with abscess.  Abscess abuts the distal tibial stump  concerning for changes of osteomyelitis.    Problem List  Sepsis  Leukocytosis  Fever, improved  Bacteremia  -Blood cultures on 10/18 +MSSA   Left BKA stump infection, abscess, osteomyelitis  -OR on 10/19 for I&D of abscess, down to level of bone, OR cultures + Streptococcus agalactiae and MSSA  Pneumonia  Rheumatoid arthritis, on immunotherapy  Immune suppression  History of gastric bypass  Alcohol abuse  CHF with moderate AAS and MR  History of left BKA  Right foot planovalgus deformation  Thoracic area back pain    Assessment:      -Notes were reviewed from primary team, radiology, ED, surgery, specialists, etc.   -Reviewed labs to date, microbiology for current admit and prior  -Imaging was independently reviewed and interpreted  -Discussion with ID pharmacy    Plan:  ID recommends ongoing hospitalization due to high risk of of morbidity for the above problems.     Recommendations for antibiotics:   --- Will stop vancomycin and transition to cefazolin given lab confirmation of MSSA infection, the coag negative staph is likely contaminant but will continue to follow-up blood cultures  --- Cefazolin also treat the Streptococcus from wound culture    Recommendations for further/ongoing work-up:   --- Follow-up blood cultures as well as repeat  --- Follow-up OR cultures  --- Significant lung findings, recommend CT chest noncontrast to better characterize pneumonia and to assess for any indication of septic emboli  --- Follow-up TTE  --- Continue to monitor back pain, if this is persistent or worsening will need to consider MRI spine  --- Monitor labs    Recommendations for intervention:   --- Plan is for additional surgical procedures        Dispo: Awaiting culture results and work-up as above   PICC: TBD      Plan of care discussed with AYAN Andrade M.D.. Will continue to follow    Aury Silva M.D.

## 2023-10-20 NOTE — WOUND TEAM
Notified by RN regarding Wound Vac malfunction.  Upon assessment, dressing with large amount of coagulated blood on top of Wound Vac dressing. Wound Vac dressing taken down, noted areas of continuous oozing. Applied surgifoam and manual pressure- unable to achieve hemostasis.    RN updated Dr. Roe on findings, MD recommending pressure dressing.    NS moistened roll gauze applied over open wound, then pressure dressing (ABD, kerlix, and coban) applied.  Recommend changing pressure dressing PRN, per MD plan is to return to OR Saturday.    Wound team not actively following, please defer to Ortho for their recommendations.  Wound team is available for any questions/dressing assistance.

## 2023-10-20 NOTE — ASSESSMENT & PLAN NOTE
"This is Septic shock Present on admission  SIRS criteria identified on my evaluation include: Leukocytosis, with WBC greater than 12,000  Clinical indicators of end organ dysfunction include Lactic Acid greater than 2  Indicators of septic shock include: Sepsis present and initial lactate level result is greater than or equal to 4mmol/L he required intravenous pressors  Sources is: Staph aureus bacteremia  Sepsis protocol initiated  Crystalloid Fluid Administration: Was given  IV antibiotics as appropriate for source of sepsis  Reassessment: I have reassessed the patient's hemodynamic status  Tapering IV hydrocortisone\"    InD of stump abscess/cellulitis today  On perioperative high dose steroids  Echo normal EF, moderate aortic stenosis, moderate AI, no mention of vegetations  Reorder his Lsix for swelling. Scrotal US for scrotal swelling  CT Chest showed bilateral opacities  ID aware.      "

## 2023-10-21 PROBLEM — E66.09 CLASS 1 OBESITY DUE TO EXCESS CALORIES WITHOUT SERIOUS COMORBIDITY WITH BODY MASS INDEX (BMI) OF 32.0 TO 32.9 IN ADULT: Status: ACTIVE | Noted: 2023-01-01

## 2023-10-21 NOTE — PROGRESS NOTES
4 Eyes Skin Assessment Completed by [facility] RN and FAITH Heath.    Head WDL  Ears WDL  Nose WDL  Mouth WDL  Neck WDL  Breast/Chest WDL  Shoulder Blades WDL  Spine WDL  (R) Arm/Elbow/Hand Redness and Blanching  (L) Arm/Elbow/Hand WDL  Abdomen WDL  Groin Redness and Abrasion  Scrotum/Coccyx/Buttocks Redness  (R) Leg Rash and Edema   (L) Leg BKA  (R) Heel/Foot/Toe Charcot foot, open wound on the sole  (L) Heel/Foot/Toe BKA          Devices In Places Pulse Ox, Rivera, SCD's, Central Line, and Nasal Cannula      Interventions In Place NC W/Ear Foams, Waffle Overlay, TAP System, and Barrier Cream    Possible Skin Injury No    Pictures Uploaded Into Epic picture already uploaded  Wound Consult Placed Yes  RN Wound Prevention Protocol Ordered No

## 2023-10-21 NOTE — PROGRESS NOTES
"Hospital Medicine Daily Progress Note    Date of Service  10/21/2023    Chief Complaint  Richard uHbbard II is a 48 y.o. male admitted 10/18/2023 with ALOC (Pt found to be altered by wife. Pt reports feeling lethargic. Pt A+Ox4 but not answering further questioning. )    Hospital Course  No notes on file  48 y.o. male who presented 10/18/2023 with fever and infected lower extremity stump.  Mr. Hubbard has a past medical history of rheumatoid arthritis and recent below the knee amputation that was most recently admitted to [facility] then went to inpatient rehab 8/20/2023 through 9/5/2023 after work-up of congestive heart failure with preserved ejection fraction.  10/18/2023 he scented to the emergency room with altered level of consciousness and lethargy.  In the emergency room he was found to have a white blood cell count of 30,000 and a CT lower extremity revealed a enhancing fluid collection at the distal soft tissues at the below the knee amputation consistent with infection and osteomyelitis.  He was admitted in guarded condition to the intensive care unit for IV fluids, broad-spectrum IV antibiotics and management of septic shock.  On 10/19/2023 he underwent left below the knee amputation incision and drainage of an abscess with wound VAC placement.  Blood cultures are positive for MSSA x2.\"    Dr. Andrade 10/20/2023     Interval Problem Update  10/21. I reviewed the chart along with vitals, labs, imaging, test (both pending and resulted) and recommendations from specialists and interdisciplinary team.  HE has L BKA stump infection with wound vac and MSSA bacteremia. Echo is pending. I reviewed ID recommendations. Ordered     I have discussed this patient's plan of care and discharge plan at IDT rounds today with Case Management, Nursing, Nursing leadership, and other members of the IDT team.    Consultants/Specialty  CC admitted  Hospitalist  Orthopedics  ID    Code Status  Full " Code    Disposition  The patient is not medically cleared for discharge to home or a post-acute facility.      I have placed the appropriate orders for post-discharge needs.    Review of Systems  Review of Systems   Constitutional:  Positive for fever. Negative for chills.   HENT:  Negative for congestion, hearing loss and nosebleeds.    Eyes:  Negative for pain and redness.   Respiratory:  Negative for cough, hemoptysis, shortness of breath and wheezing.    Cardiovascular:  Negative for chest pain and palpitations.   Gastrointestinal:  Negative for abdominal pain, blood in stool, constipation, diarrhea, nausea and vomiting.   Genitourinary:  Negative for dysuria, frequency and hematuria.   Musculoskeletal:  Negative for falls, joint pain and myalgias.   Skin:  Positive for rash.   Neurological:  Negative for dizziness, tremors, focal weakness, seizures, loss of consciousness, weakness and headaches.   Psychiatric/Behavioral:  The patient is not nervous/anxious and does not have insomnia.    All other systems reviewed and are negative.       Physical Exam  Temp:  [36 °C (96.8 °F)-36.7 °C (98.1 °F)] 36.1 °C (97 °F)  Pulse:  [83-89] 89  Resp:  [11-18] 17  BP: ()/(57-70) 105/66  SpO2:  [95 %-98 %] 95 %    Physical Exam  Vitals and nursing note reviewed.   Constitutional:       Appearance: He is obese.   HENT:      Head: Normocephalic and atraumatic.      Right Ear: External ear normal.      Left Ear: External ear normal.      Nose: Nose normal.      Mouth/Throat:      Mouth: Mucous membranes are moist.   Eyes:      General: No scleral icterus.     Conjunctiva/sclera: Conjunctivae normal.   Cardiovascular:      Rate and Rhythm: Normal rate and regular rhythm.      Heart sounds: Murmur heard.      No friction rub. No gallop.   Pulmonary:      Effort: Pulmonary effort is normal.      Breath sounds: Normal breath sounds.   Abdominal:      General: Abdomen is flat. Bowel sounds are normal. There is no distension.       Palpations: Abdomen is soft.      Tenderness: There is no abdominal tenderness. There is no guarding.      Comments: Ventral hernia   Musculoskeletal:         General: Deformity (L BKA) present. Normal range of motion.      Cervical back: Normal range of motion and neck supple.      Comments: R Charcot foot   Skin:     General: Skin is warm.   Neurological:      Mental Status: He is alert and oriented to person, place, and time. Mental status is at baseline.   Psychiatric:         Mood and Affect: Mood normal.         Behavior: Behavior normal.         Thought Content: Thought content normal.         Judgment: Judgment normal.         Fluids    Intake/Output Summary (Last 24 hours) at 10/21/2023 1209  Last data filed at 10/20/2023 1706  Gross per 24 hour   Intake 248.74 ml   Output 200 ml   Net 48.74 ml       Laboratory  Recent Labs     10/19/23  0130 10/19/23  0308 10/20/23  0326   WBC 34.9* 36.2* 21.6*   RBC 3.65* 3.77* 3.59*   HEMOGLOBIN 10.4* 10.7* 10.3*   HEMATOCRIT 33.2* 34.9* 33.0*   MCV 91.0 92.6 91.9   MCH 28.5 28.4 28.7   MCHC 31.3* 30.7* 31.2*   RDW 58.4* 59.7* 58.1*   PLATELETCT 249 258 212   MPV 9.6 9.2 9.3     Recent Labs     10/18/23  2050 10/18/23  2326 10/20/23  0326   SODIUM 138 134* 141   POTASSIUM 4.2 3.9 4.0   CHLORIDE 100 100 106   CO2 27 26 27   GLUCOSE 73 79 156*   BUN 17 19 19   CREATININE 0.96 0.93 0.81   CALCIUM 8.2* 7.5* 7.7*     Recent Labs     10/19/23  0130   INR 1.25*               Imaging  DX-CHEST-PORTABLE (1 VIEW)   Final Result         1.  Pulmonary edema and/or infiltrates are identified, which appear somewhat increased since the prior exam.   2.  Cardiomegaly      CT-EXTREMITY, LOWER WITH LEFT   Final Result         1.  Enhancing fluid collection at the distal soft tissues at below the knee amputation, appearance most compatible with abscess. Appears to communicate with the skin surface.   2.  Suspected abscess abuts the distal tibial stump concerning for changes of  "osteomyelitis.   3.  Diffuse subcutaneous fat stranding, compatible changes of edema and/or cellulitis.      CT-ABDOMEN-PELVIS WITH   Final Result         1.  4.6 cm mid abdominal hernia containing a portion of transverse without visualized obstructive changes   2.  Bilateral pulmonary infiltrates.   3.  Moderate right and trace left pleural effusion   4.  Diffuse subcutaneous fat stranding, appearance favoring changes of anasarca   5.  Atherosclerosis and atherosclerotic coronary artery disease      DX-TIBIA AND FIBULA LEFT   Final Result         1.  No acute traumatic bony injury.   2.  Soft tissue gas at the distal amputation site anteriorly, appearance suggests open wound and/or underlying infectious etiology. Correlate with exam.      DX-CHEST-PORTABLE (1 VIEW)   Final Result         1.  Pulmonary edema and/or infiltrates.   2.  Trace right pleural effusion   3.  Cardiomegaly      EC-ECHOCARDIOGRAM COMPLETE W/ CONT    (Results Pending)   CT-CHEST (THORAX) W/O    (Results Pending)        Assessment/Plan  * Septic shock, MSSA bacteremia, L BKA stump abscess (HCC)- (present on admission)  Assessment & Plan  This is Septic shock Present on admission  SIRS criteria identified on my evaluation include: Leukocytosis, with WBC greater than 12,000  Clinical indicators of end organ dysfunction include Lactic Acid greater than 2  Indicators of septic shock include: Sepsis present and initial lactate level result is greater than or equal to 4mmol/L he required intravenous pressors  Sources is: Staph aureus bacteremia  Sepsis protocol initiated  Crystalloid Fluid Administration: Was given  IV antibiotics as appropriate for source of sepsis  Reassessment: I have reassessed the patient's hemodynamic status  Tapering IV hydrocortisone\"    Surgery today  On perioperative high dose steroids  Echo PENDING  Ordered CT Chest  Continue current antibiotics        Class 1 obesity due to excess calories without serious comorbidity with " body mass index (BMI) of 32.0 to 32.9 in adult  Assessment & Plan  Recommended weight loss advised, 5% through reduced calorie, low carb diet and 150 mins of exercise a week once better  Recommend bariatric surgery evaluation if morbidly obese  Educated on the increase of morbidity and mortality associated with excess weight including DM, Heart Disease, HTN, stroke, and sleep apnea. Recommended outpatient monitoring  of blood sugars, lipid panel, sleep study evaluation for metabolic syndrome.      Abscess of left lower extremity- (present on admission)  Assessment & Plan  Status post debridement on 10/19 and he will require further debridement by ortho.    Bacteremia due to Staphylococcus aureus- (present on admission)  Assessment & Plan  MSSA bacteremia with blood cultures x2 positive  Echocardiogram has been ordered  Infectious disease has been consulted    Acquired planovalgus deformity of right foot- (present on admission)  Assessment & Plan  Charcot foot on the right side    Chronic diastolic heart failure with preserved ejection fraction (HCC)- (present on admission)  Assessment & Plan  Monitor for volume overload  He had been on lasix as an outpatient    Chronic pain syndrome- (present on admission)  Assessment & Plan  He is followed by [facility].  He is on morphine immediate release 30 mg 4 times a day and morphine sustained release 15 mg twice daily which will be restarted.  Continue Lyrica and Cymbalta.  Start oxycodone for moderate breakthrough pain and IV Dilaudid for severe breakthrough pain.    Rheumatoid arthritis (HCC)- (present on admission)  Assessment & Plan  He has been on Humira in the past         VTE prophylaxis: Not on pharmacolohic DVT prophyalxis. Will start one if no contraindication.     I have performed a physical exam and reviewed and updated ROS and Plan today (10/21/2023). In review of yesterday's note (10/20/2023), there are no changes except as documented  above.

## 2023-10-21 NOTE — PROGRESS NOTES
Infectious Disease Progress Note    Author: Aury Silva M.D. Date & Time of service: 10/21/2023  8:28 AM    Chief Complaint:  MSSA bacteremia and BKA stump infection    Interval History:      Review of Systems:  Review of Systems   Gastrointestinal:  Negative for abdominal pain, constipation, diarrhea, nausea and vomiting.   Musculoskeletal:  Positive for joint pain and myalgias. Negative for back pain and neck pain.   Neurological:  Negative for weakness.   Psychiatric/Behavioral:  The patient is not nervous/anxious.        Hemodynamics:  Temp (24hrs), Av.2 °C (97.2 °F), Min:36 °C (96.8 °F), Max:36.7 °C (98.1 °F)  Temperature: 36.1 °C (97 °F), Monitored Temp: 36 °C (96.8 °F)  Pulse  Av.5  Min: 76  Max: 110   Blood Pressure: 105/66       Physical Exam:  Physical Exam  Cardiovascular:      Rate and Rhythm: Normal rate and regular rhythm.      Heart sounds: Normal heart sounds.   Pulmonary:      Effort: Pulmonary effort is normal.      Breath sounds: Normal breath sounds.   Abdominal:      General: Abdomen is flat. Bowel sounds are normal.      Palpations: Abdomen is soft.   Musculoskeletal:      Comments: Status post left BKA, site and surgical dressings, clean, dry and intact   Skin:     General: Skin is warm and dry.   Neurological:      General: No focal deficit present.      Mental Status: He is oriented to person, place, and time.   Psychiatric:         Mood and Affect: Mood normal.         Behavior: Behavior normal.         Meds:    Current Facility-Administered Medications:     morphine    [COMPLETED] hydrocortisone sodium succinate PF **FOLLOWED BY** [START ON 10/22/2023] hydrocortisone sodium succinate PF    zolpidem    oxyCODONE immediate-release    ceFAZolin    Nozin nasal  swab    omeprazole    senna-docusate **AND** polyethylene glycol/lytes **AND** magnesium hydroxide **AND** bisacodyl    acetaminophen    HYDROmorphone    MD Alert...Adult ICU Electrolyte Replacement per  Pharmacy    morphine ER    pregabalin    DULoxetine    Labs:  Recent Labs     10/19/23  0130 10/19/23  0308 10/20/23  0326   WBC 34.9* 36.2* 21.6*   RBC 3.65* 3.77* 3.59*   HEMOGLOBIN 10.4* 10.7* 10.3*   HEMATOCRIT 33.2* 34.9* 33.0*   MCV 91.0 92.6 91.9   MCH 28.5 28.4 28.7   RDW 58.4* 59.7* 58.1*   PLATELETCT 249 258 212   MPV 9.6 9.2 9.3   NEUTSPOLYS 86.50* 86.40* 94.60*   LYMPHOCYTES 7.80* 7.80* 2.20*   MONOCYTES 3.60 3.80 2.30   EOSINOPHILS 0.00 0.00 0.00   BASOPHILS 0.20 0.20 0.10     Recent Labs     10/18/23  2050 10/18/23  2326 10/20/23  0326   SODIUM 138 134* 141   POTASSIUM 4.2 3.9 4.0   CHLORIDE 100 100 106   CO2 27 26 27   GLUCOSE 73 79 156*   BUN 17 19 19     Recent Labs     10/18/23  2050 10/18/23  2326 10/20/23  0326   ALBUMIN 3.3 2.7* 2.7*   TBILIRUBIN 0.6 0.5 0.3   ALKPHOSPHAT 119* 115* 121*   TOTPROTEIN 6.5 5.7* 6.0   ALTSGPT 8 12 12   ASTSGOT 10* 23 15   CREATININE 0.96 0.93 0.81       Imaging:  CT-EXTREMITY, LOWER WITH LEFT    Result Date: 10/18/2023    10/18/2023 10:12 PM HISTORY/REASON FOR EXAM:  Altered; Left stump abscess deep space infection. TECHNIQUE/EXAM DESCRIPTION AND NUMBER OF VIEWS:  CT scan of the LEFT lower extremity with contrast, with reconstructions. Thin helical 3 mm sections were obtained from the distal femur through the proximal tibia/fibula. Sagittal and coronal multiplanar reconstructions were generated from the axial images. A total of 100 mL of Omnipaque 350 nonionic contrast was administered  IV without complication. Up to date radiation dose reduction adjustments have been utilized to meet ALARA standards for radiation dose reduction. COMPARISON: February 28, 2023 FINDINGS: Limited views of the small bowel and colon appear unremarkable. The bladder appears within normal limits. There is bony demineralization noted. Left below the knee amputation is observed. There is 5.7 x 4.5 cm peripherally enhancing fluid collection identified at the distal margin of the tibial stump,  associated foci of air are seen anteriorly with probable anterior wound. Diffuse subcutaneous fat stranding is seen.     1.  Enhancing fluid collection at the distal soft tissues at below the knee amputation, appearance most compatible with abscess. Appears to communicate with the skin surface. 2.  Suspected abscess abuts the distal tibial stump concerning for changes of osteomyelitis. 3.  Diffuse subcutaneous fat stranding, compatible changes of edema and/or cellulitis.    DX-CHEST-PORTABLE (1 VIEW)    Result Date: 10/18/2023    10/18/2023 10:59 PM HISTORY/REASON FOR EXAM: line placement TECHNIQUE/EXAM DESCRIPTION:  Single AP view of the chest. COMPARISON: Today at 2120 FINDINGS: Right internal jugular central line is seen terminating within the right atrium.   Cardiomegaly is observed. The mediastinal contour appears within normal limits.  The central  pulmonary vasculature appears prominent and indistinct. Bilateral lung volumes are diminished.  Diffuse scattered hazy pulmonary parenchymal opacities are seen. No significant pleural effusions are identified. The bony structures appear age-appropriate.     1.  Pulmonary edema and/or infiltrates are identified, which appear somewhat increased since the prior exam. 2.  Cardiomegaly    CT-ABDOMEN-PELVIS WITH    Result Date: 10/18/2023    10/18/2023 10:12 PM HISTORY/REASON FOR EXAM:  Pain. TECHNIQUE/EXAM DESCRIPTION:   CT scan of the abdomen and pelvis with contrast. Contrast-enhanced helical scanning was obtained from the diaphragmatic domes through the pubic symphysis following the bolus administration of nonionic contrast without complication. 100 mL of Omnipaque 350 nonionic contrast was administered without complication. Low dose optimization technique was utilized for this CT exam including automated exposure control and adjustment of the mA and/or kV according to patient size. COMPARISON: No prior studies available. FINDINGS: Lower Chest: Scattered bilateral  pulmonary consolidations are seen. Moderate right and trace left pleural effusion is seen. Liver: Normal. Spleen: Unremarkable. Pancreas: Unremarkable. Gallbladder: No calcified stones. Biliary: Nondilated. Adrenal glands: Normal. Kidneys: Unremarkable without hydronephrosis. Bowel: No obstruction or acute inflammation. 4.6 cm mid abdominal hernia seen containing a portion of the transverse colon. Lymph nodes: No adenopathy. Vasculature: Atherosclerotic changes are seen including atherosclerotic coronary artery calcifications.. Peritoneum: Unremarkable without ascites. Musculoskeletal: No acute or destructive process. Diffuse subcutaneous fat stranding is seen. Pelvis: No adenopathy or free fluid.     1.  4.6 cm mid abdominal hernia containing a portion of transverse without visualized obstructive changes 2.  Bilateral pulmonary infiltrates. 3.  Moderate right and trace left pleural effusion 4.  Diffuse subcutaneous fat stranding, appearance favoring changes of anasarca 5.  Atherosclerosis and atherosclerotic coronary artery disease    DX-TIBIA AND FIBULA LEFT    Result Date: 10/18/2023  10/18/2023 9:28 PM HISTORY/REASON FOR EXAM: Atraumatic Pain/Swelling/Deformity; From the knee down please and thank you TECHNIQUE/EXAM DESCRIPTION:  AP and lateral views of the LEFT tibia and fibula. COMPARISON:  None FINDINGS: Postsurgical changes of below-the-knee amputation are seen. There is soft tissue gas identified at the distal site anteriorly.     1.  No acute traumatic bony injury. 2.  Soft tissue gas at the distal amputation site anteriorly, appearance suggests open wound and/or underlying infectious etiology. Correlate with exam.    DX-CHEST-PORTABLE (1 VIEW)    Result Date: 10/18/2023    10/18/2023 9:12 PM HISTORY/REASON FOR EXAM: possible sepsis. TECHNIQUE/EXAM DESCRIPTION:  Single AP view of the chest. COMPARISON: September 1, 2023 FINDINGS: Cardiomegaly is observed. The mediastinal contour appears within normal  limits.  The central  pulmonary vasculature appears prominent and indistinct. Bilateral lung volumes are diminished.  Diffuse scattered hazy pulmonary parenchymal opacities are seen. Mild blunting of the right costophrenic angle is seen suggesting small right effusion. The bony structures appear age-appropriate.     1.  Pulmonary edema and/or infiltrates. 2.  Trace right pleural effusion 3.  Cardiomegaly    DX-FOOT-2- RIGHT    Result Date: 10/10/2023  Independent review of the patient's x-rays of the right ankle and foot weightbearing taken today including AP, lateral and oblique views demonstrate significant and severe pes planovalgus deformity.  There is subfibular impingement and contact of the calcaneus adjacent to the distal fibula, subluxation of the subtalar as well as the talonavicular joints.  No acute fractures are noted.    DX-ANKLE 3+ VIEWS RIGHT    Result Date: 10/10/2023  Independent review of the patient's x-rays of the right ankle and foot weightbearing taken today including AP, lateral and oblique views demonstrate significant and severe pes planovalgus deformity.  There is subfibular impingement and contact of the calcaneus adjacent to the distal fibula, subluxation of the subtalar as well as the talonavicular joints.  No acute fractures are noted.      Micro:        Assessment:  Active Hospital Problems    Diagnosis     *Septic shock, MSSA bacteremia, LLE abscess ([facility]) [A41.9, R65.21]     Bacteremia due to Staphylococcus aureus [R78.81, B95.61]     Abscess of left lower extremity [L02.416]     Acquired planovalgus deformity of right foot [M21.6X1]     Chronic diastolic heart failure with preserved ejection fraction (HCC) [I50.32]     Chronic pain syndrome [G89.4]     Rheumatoid arthritis ([facility]) [M06.9]     Chronic, continuous use of opioids [F11.90]     MONICA (obstructive sleep apnea) [G47.33]     Mood disorder ([facility]) [F39]     Insomnia [G47.00]      Interval 24 hours:      AF, O2 RA  Labs reviewed to  assess for clinical status, drug toxicity and organ function, no new labs today   Studies reviewed  Micro reviewed    Patient overall doing well, some pain at prior surgical site.  States his back pain has resolved today.  He has chronic back pain worse with movement.      ASSESSMENT/PLAN:      48 y.o.  admitted 10/18/2023. Pt has a past medical history of RA on Humira per notes.  Patient also with history of chronic bilateral foot ulcers ongoing since 2022.  He was last seen by ID in 3/23 for group A strep bacteremia at leg tenosynovitis with left foot osteomyelitis.  He underwent left BKA on 3/9/2023.  He presented complaining of altered mentation and fevers.     Hospital Course:   Febrile to 102.2 on arrival, leukocytosis on arrival and ongoing.  CT abdomen pelvis relatively unremarkable but lower lung bases with infiltrates.  CT leg with enhancing fluid collection below-knee amputation, compatible with abscess.  Abscess abuts the distal tibial stump concerning for changes of osteomyelitis.     Problem List  Sepsis, improved   Leukocytosis, improving  Fever, improved  Bacteremia  -Blood cultures on 10/18 +MSSA   Left BKA stump infection, abscess, osteomyelitis  -OR on 10/19 for I&D of abscess, down to level of bone, OR cultures + Streptococcus agalactiae and MSSA  Pneumonia  Rheumatoid arthritis, on immunotherapy  Immune suppression  History of gastric bypass  Alcohol abuse  CHF with moderate AAS and MR  History of left BKA  Right foot planovalgus deformation  Thoracic area back pain     Plan:  ID recommends ongoing hospitalization due to high risk of of morbidity for the above problems.      Recommendations for antibiotics:   --- Continue cefazolin 2 g every 8 hours given lab confirmation of MSSA infection, the coag negative staph is likely contaminant but will continue to follow-up blood cultures  --- Cefazolin also treat the Streptococcus from wound culture     Recommendations for further/ongoing work-up:    --- Follow-up blood cultures as well as repeat  --- Follow-up OR cultures  --- Significant lung findings, recommend CT chest noncontrast to better characterize pneumonia and to assess for any indication of septic emboli  --- Follow-up TTE  --- Continue to monitor back pain, if this is persistent or worsening will need to consider MRI spine  --- Monitor labs     Recommendations for intervention:   --- Plan is for additional surgical procedures  --- Patient will need RIJ removed soon as feasible as this was placed while still bacteremic, okay to leave for procedure planned tomorrow then would remove after that         Dispo: Awaiting culture results and work-up as above   PICC: TBD, will eventually need PICC line once blood cultures are confirmed as negative        Plan of care discussed with Dr. Ortega and microbiology department.   Will continue to follow     Aury Silva M.D.

## 2023-10-21 NOTE — PROGRESS NOTES
"1732 Report received from LILIANA Berg RN.    1900 Received patient from [facility] via gurney accompanied by one male Transport. Transferred patient to bed using slide board x4 assist. Bed waffle, TAPS and bed alarm placed. Fall protocol in effect. Call light within reach. Reminded patient to call for assist. Verbalized understanding.    1925 Patient in bed. Bedside report given to Oncoming JENNFIER RN\"S (Mirta).  "

## 2023-10-21 NOTE — PROGRESS NOTES
Plan for OR Sunday for repeat I&D due to OR availabily   Ok for diet tomorrow, NPO Saturday night renee Colindres MD  Orthopedic Trauma Surgery

## 2023-10-21 NOTE — CARE PLAN
The patient is Stable - Low risk of patient condition declining or worsening    Shift Goals  Clinical Goals: pain control, VS monitoring  Patient Goals: Rest, pain control  Family Goals: JI    Progress made toward(s) clinical / shift goals:  patient resting comfortably, pain is controlled by PRN pain meds    Problem: Pain - Standard  Goal: Alleviation of pain or a reduction in pain to the patient’s comfort goal  Outcome: Progressing       Patient is not progressing towards the following goals:

## 2023-10-22 NOTE — DISCHARGE PLANNING
TCN following. HTH/SCP chart review completed. Patient in Surgery this AM for L BKA I & D.  TCN will f/u tomorrow.

## 2023-10-22 NOTE — DISCHARGE PLANNING
Received choice form at: 1044 via Teams by FAITH STILES  Agency/Facility name: [facility]  Referral sent per choice form at:  [phone]

## 2023-10-22 NOTE — CARE PLAN
The patient is Stable - Low risk of patient condition declining or worsening    Shift Goals  Clinical Goals: pain control, safety  Patient Goals: comfort  Family Goals: no family present    Progress made toward(s) clinical / shift goals:    Problem: Pain - Standard  Goal: Alleviation of pain or a reduction in pain to the patient’s comfort goal  Outcome: Progressing    Administer pain medication as per MAR.     Problem: Knowledge Deficit - Standard  Goal: Patient and family/care givers will demonstrate understanding of plan of care, disease process/condition, diagnostic tests and medications  Outcome: Progressing    Educate patient about his pln of care and encourage to actively participate.     Problem: Skin Integrity  Goal: Skin integrity is maintained or improved  Outcome: Progressing    Encourage patient to mobilize and educate the importance of mobilization.       Patient is not progressing towards the following goals:

## 2023-10-22 NOTE — OP REPORT
DATE OF OPERATION: 10/22/2023     PREOPERATIVE DIAGNOSIS: Left below-knee amputation abscess    POSTOPERATIVE DIAGNOSIS: Same    PROCEDURE PERFORMED: Left BKA revision primary closure    SURGEON: River Colindres M.D.     ASSISTANT: None    ANESTHESIA: General    SPECIMEN: None    ESTIMATED BLOOD LOSS: 20 mL    IMPLANTS: None      INDICATIONS: The patient is a 48 y.o. male who presented with above.  I discussed the risks and benefits of the procedure which include but are not limited to risks of infection, wound healing complication, neurovascular injury, pain, malunion, non-union, malrotation, and the medical risks of anesthesia including MI, stroke, and death.  Alternatives to surgery were also discussed, including non-operative management, which I did not recommend.  The patient was in agreement with the plan to proceed, and the informed consent was signed and documented.  I met with the patient pre-operatively and marked the operative extremity with their agreement.  We proceeded to the operating room.     DESCRIPTION OF PROCEDURE:  Patient was seen in the preoperative holding area on the day of surgery. The operative site was marked with my initials.  he was taken to the operating room and placed supine on the operative table.  Anesthesia was induced.  The operative extremity was prepped and draped in the normal sterile fashion.  Operative pause was conducted and the correct patient, site, side, procedure, and surgeon's initials on extremity were identified.  Previous amputation site was evaluated noted be clean without any obvious purulence.  Wound was debrided with a Stokes elevator.  Distal femur was then excised.  Tissue was obtained and sent for culture.  Tourniquet was deflated any active bleeding was then cauterized Bovie cautery.  The wound was then thoroughly irrigated sterile saline closed in layered fashion with 0 Vicryl, 2-0 Vicryl, 2-0 nylon suture.  Sterile dressings applied.  Patient awoken taken  to PACU stable condition.    POSTOPERATIVE PLAN: Nonweightbearing left lower extremity weight bearing.  Mobilize with physical and occupational therapies.  DVT prophylaxis with SCDs and Lovenox until mobilizing independently and then can be switched to aspirin for 4 weeks.  Antibiotics per primary team.  The patient will follow up in clinic in 2 weeks to check wounds and remove sutures/staples.      ____________________________________   River Colindres M.D.   DD: 10/22/2023  9:27 AM

## 2023-10-22 NOTE — CARE PLAN
The patient is Stable - Low risk of patient condition declining or worsening    Shift Goals  Clinical Goals: pain control, safety  Patient Goals: sleep, pain relief  Family Goals: elias    Progress made toward(s) clinical / shift goals:      Problem: Pain - Standard  Goal: Alleviation of pain or a reduction in pain to the patient’s comfort goal  Outcome: Not Progressing     Problem: Respiratory  Goal: Patient will achieve/maintain optimum respiratory ventilation and gas exchange  Outcome: Not Progressing       Patient still relies on scheduled narcotics for pain. Patient still O2 dependent.

## 2023-10-22 NOTE — PROGRESS NOTES
Pt left for surgery at 0756 and returned at 1100. He ws pretty sedated, but rated his pain at a 9/10. When asked what his pain was at home on a daily basis, he replied 7/10. He was given several narcotics in the PACU so none were given upon arrival to the floor until he awoke for vitals at  approx 1320. O2 sats are 92-94% with 4L O2 NC. Wife Katiana was called upon return to the floor and a report was given via voicemail. Wound vac was removed. Vanco powder was placed inside the wound abd the wound was closed with staples, xeroform gauze and an ace wrap were placed over the wound once closed.

## 2023-10-22 NOTE — ANESTHESIA PROCEDURE NOTES
Airway    Date/Time: 10/22/2023 8:44 AM    Performed by: Maxi Polk M.D.  Authorized by: Maxi Polk M.D.    Location:  OR  Urgency:  Elective  Indications for Airway Management:  Anesthesia      Spontaneous Ventilation: absent    Sedation Level:  Deep  Preoxygenated: Yes    Final Airway Type:  Supraglottic airway  Final Supraglottic Airway:  Standard LMA    SGA Size:  5  Number of Attempts at Approach:  1   Atraumatic insertion, good seal

## 2023-10-22 NOTE — ANESTHESIA TIME REPORT
Anesthesia Start and Stop Event Times     Date Time Event    10/22/2023 0757 Ready for Procedure     0839 Anesthesia Start     0916 Anesthesia Stop        Responsible Staff  10/22/23    Name Role Begin End    Maxi Polk M.D. Anesth 0839 0916        Overtime Reason:  no overtime (within assigned shift)    Comments:

## 2023-10-22 NOTE — ANESTHESIA POSTPROCEDURE EVALUATION
Patient: Richard Hubbard II    Procedure Summary     Date: 10/22/23 Room / Location: [place] [id number] / SURGERY [facility]    Anesthesia Start: 0839 Anesthesia Stop: 0916    Procedure: REVISION, BELOW KNEE AMPUTATION (Left: Leg Lower) Diagnosis: (LEFT BELOW KNEE AMPUTATION INFECTION)    Surgeons: River Colindres M.D. Responsible Provider: Maxi Polk M.D.    Anesthesia Type: general ASA Status: 4          Final Anesthesia Type: general  Last vitals  BP   Blood Pressure: 90/60    Temp   36.1 °C (97 °F)    Pulse   78   Resp   17    SpO2   94 %      Anesthesia Post Evaluation    Patient location during evaluation: PACU  Patient participation: complete - patient participated  Level of consciousness: awake and alert  Pain score: 6    Airway patency: patent  Anesthetic complications: no  Cardiovascular status: hemodynamically stable  Respiratory status: acceptable  Hydration status: euvolemic    PONV: none          No notable events documented.

## 2023-10-22 NOTE — OR NURSING
Pt is aaox4, resting comfortably in hospital bed on 4lpm nasal cannula. His respirations are equal and unlabored, he is in no acute distress. Pt is treated for pain per MAR. Dr. Polk updated on pt's uncontrolled pain earlier on with new orders in place to help ease pt's discomfort. Pt takes sips of water without difficulty or complaints of n/v. Surgical site is clean, dry and intact and elevated on pillows. Will continue to monitor.     Handoff report to JUSTIN Mar RN and aware of pt arrival.

## 2023-10-22 NOTE — ANESTHESIA PREPROCEDURE EVALUATION
Case: [id number] Date/Time: 10/22/23 0840    Procedure: INCISION AND DRAINAGE, LEFT BELOW KNEE AMPUTATION WASHOUT (Left)    Location: [facility] OR  / SURGERY [facility]    Surgeons: River Colindres M.D.        Admitted with sepsis and has MSSA bacteremia.    Relevant Problems   ANESTHESIA   (positive) MONICA (obstructive sleep apnea)      CARDIAC   (positive) Aortic root dilatation (HCC)   (positive) Aortic stenosis, moderate   (positive) Left bundle branch block   (positive) Murmur, cardiac         (positive) KATERYNA (acute kidney injury) (HCC)   (positive) Cardiorenal syndrome      Other   (positive) Rheumatoid arthritis (HCC)       Physical Exam    Airway   Mallampati: II  TM distance: >3 FB  Neck ROM: full       Cardiovascular - normal exam  Rhythm: regular  Rate: normal  (-) murmur     Dental       Very poor dentition   Pulmonary - normal exam  Breath sounds clear to auscultation     Abdominal    Neurological - normal exam                 Anesthesia Plan    ASA 4   ASA physical status 4 criteria: sepsis    Plan - general       Airway plan will be LMA          Induction: intravenous    Postoperative Plan: Postoperative administration of opioids is intended.    Pertinent diagnostic labs and testing reviewed    Informed Consent:    Anesthetic plan and risks discussed with patient.    Use of blood products discussed with: patient whom consented to blood products.

## 2023-10-22 NOTE — PROGRESS NOTES
"[facility] Medicine Daily Progress Note    Date of Service  10/22/2023    Chief Complaint  Richard Hubbard II is a 48 y.o. male admitted 10/18/2023 with ALOC (Pt found to be altered by wife. Pt reports feeling lethargic. Pt A+Ox4 but not answering further questioning. )    Hospital Course  No notes on file  48 y.o. male who presented 10/18/2023 with fever and infected lower extremity stump.  Mr. Hubbard has a past medical history of rheumatoid arthritis and recent below the knee amputation that was most recently admitted to [facility] then went to inpatient rehab 8/20/2023 through 9/5/2023 after work-up of congestive heart failure with preserved ejection fraction.  10/18/2023 he scented to the emergency room with altered level of consciousness and lethargy.  In the emergency room he was found to have a white blood cell count of 30,000 and a CT lower extremity revealed a enhancing fluid collection at the distal soft tissues at the below the knee amputation consistent with infection and osteomyelitis.  He was admitted in guarded condition to the intensive care unit for IV fluids, broad-spectrum IV antibiotics and management of septic shock.  On 10/19/2023 he underwent left below the knee amputation incision and drainage of an abscess with wound VAC placement.  Blood cultures are positive for MSSA x2.\"    Dr. Andrade 10/20/2023     Interval Problem Update  10/21. I reviewed the chart along with vitals, labs, imaging, test (both pending and resulted) and recommendations from specialists and interdisciplinary team.  HE has L BKA stump infection with wound vac and MSSA bacteremia. Echo is pending. I reviewed ID recommendations. Ordered CT chest  10/22. Planned for another InD for stump infection.abscess. Patient anticipating.   Echo PENDING  CT chest showed bilateral pulmonary opacities. Currently on antibiotics  Await OR cultures and discuss with ID.  Leukocytosis. Ordred labs.    I have discussed this patient's " plan of care and discharge plan at IDT rounds today with Case Management, Nursing, Nursing leadership, and other members of the IDT team.    Consultants/Specialty  CC admitted  Hospitalist  Orthopedics  ID    Code Status  Full Code    Disposition  The patient is not medically cleared for discharge to home or a post-acute facility.      I have placed the appropriate orders for post-discharge needs.    Review of Systems  Review of Systems   Constitutional:  Positive for fever. Negative for chills.   HENT:  Negative for congestion, hearing loss and nosebleeds.    Eyes:  Negative for pain and redness.   Respiratory:  Negative for cough, hemoptysis, shortness of breath and wheezing.    Cardiovascular:  Negative for chest pain and palpitations.   Gastrointestinal:  Negative for abdominal pain, blood in stool, constipation, diarrhea, nausea and vomiting.   Genitourinary:  Negative for dysuria, frequency and hematuria.   Musculoskeletal:  Negative for falls, joint pain and myalgias.   Skin:  Positive for rash.   Neurological:  Negative for dizziness, tremors, focal weakness, seizures, loss of consciousness, weakness and headaches.   Psychiatric/Behavioral:  The patient is not nervous/anxious and does not have insomnia.    All other systems reviewed and are negative.       Physical Exam  Temp:  [36.1 °C (97 °F)-36.8 °C (98.2 °F)] 36.1 °C (97 °F)  Pulse:  [] 78  Resp:  [12-20] 17  BP: ()/(50-72) 104/61  SpO2:  [92 %-98 %] 96 %    Physical Exam  Vitals and nursing note reviewed.   Constitutional:       Appearance: He is obese.   HENT:      Head: Normocephalic and atraumatic.      Right Ear: External ear normal.      Left Ear: External ear normal.      Nose: Nose normal.      Mouth/Throat:      Mouth: Mucous membranes are moist.   Eyes:      General: No scleral icterus.     Conjunctiva/sclera: Conjunctivae normal.   Cardiovascular:      Rate and Rhythm: Normal rate and regular rhythm.      Heart sounds: Murmur  heard.      No friction rub. No gallop.   Pulmonary:      Effort: Pulmonary effort is normal.      Breath sounds: Normal breath sounds.   Abdominal:      General: Abdomen is flat. Bowel sounds are normal. There is no distension.      Palpations: Abdomen is soft.      Tenderness: There is no abdominal tenderness. There is no guarding.      Comments: Ventral hernia   Musculoskeletal:         General: Deformity (L BKA) present. Normal range of motion.      Cervical back: Normal range of motion and neck supple.      Comments: R Charcot foot   Skin:     General: Skin is warm.   Neurological:      Mental Status: He is alert and oriented to person, place, and time. Mental status is at baseline.   Psychiatric:         Mood and Affect: Mood normal.         Behavior: Behavior normal.         Thought Content: Thought content normal.         Judgment: Judgment normal.      Comments: Calm         Fluids    Intake/Output Summary (Last 24 hours) at 10/22/2023 1324  Last data filed at 10/22/2023 0908  Gross per 24 hour   Intake 400 ml   Output 1315 ml   Net -915 ml         Laboratory  Recent Labs     10/20/23  0326   WBC 21.6*   RBC 3.59*   HEMOGLOBIN 10.3*   HEMATOCRIT 33.0*   MCV 91.9   MCH 28.7   MCHC 31.2*   RDW 58.1*   PLATELETCT 212   MPV 9.3       Recent Labs     10/20/23  0326   SODIUM 141   POTASSIUM 4.0   CHLORIDE 106   CO2 27   GLUCOSE 156*   BUN 19   CREATININE 0.81   CALCIUM 7.7*                       Imaging  CT-CHEST (THORAX) W/O   Final Result      1.  Moderate size right pleural effusion is identified.      2.  Probable consolidative atelectasis in the right lower pulmonary lobe.      3.  Bilateral pulmonary opacifications are identified as described above. Findings could be due to inflammation or infection. Pneumonitis and pneumonia are possibilities.      Fleischner Society pulmonary nodule recommendations:   Not Applicable         DX-CHEST-PORTABLE (1 VIEW)   Final Result         1.  Pulmonary edema and/or  infiltrates are identified, which appear somewhat increased since the prior exam.   2.  Cardiomegaly      CT-EXTREMITY, LOWER WITH LEFT   Final Result         1.  Enhancing fluid collection at the distal soft tissues at below the knee amputation, appearance most compatible with abscess. Appears to communicate with the skin surface.   2.  Suspected abscess abuts the distal tibial stump concerning for changes of osteomyelitis.   3.  Diffuse subcutaneous fat stranding, compatible changes of edema and/or cellulitis.      CT-ABDOMEN-PELVIS WITH   Final Result         1.  4.6 cm mid abdominal hernia containing a portion of transverse without visualized obstructive changes   2.  Bilateral pulmonary infiltrates.   3.  Moderate right and trace left pleural effusion   4.  Diffuse subcutaneous fat stranding, appearance favoring changes of anasarca   5.  Atherosclerosis and atherosclerotic coronary artery disease      DX-TIBIA AND FIBULA LEFT   Final Result         1.  No acute traumatic bony injury.   2.  Soft tissue gas at the distal amputation site anteriorly, appearance suggests open wound and/or underlying infectious etiology. Correlate with exam.      DX-CHEST-PORTABLE (1 VIEW)   Final Result         1.  Pulmonary edema and/or infiltrates.   2.  Trace right pleural effusion   3.  Cardiomegaly      EC-ECHOCARDIOGRAM COMPLETE W/ CONT    (Results Pending)        Assessment/Plan  * Septic shock, MSSA bacteremia, L BKA stump abscess (HCC)- (present on admission)  Assessment & Plan  This is Septic shock Present on admission  SIRS criteria identified on my evaluation include: Leukocytosis, with WBC greater than 12,000  Clinical indicators of end organ dysfunction include Lactic Acid greater than 2  Indicators of septic shock include: Sepsis present and initial lactate level result is greater than or equal to 4mmol/L he required intravenous pressors  Sources is: Staph aureus bacteremia  Sepsis protocol initiated  Crystalloid  "Fluid Administration: Was given  IV antibiotics as appropriate for source of sepsis  Reassessment: I have reassessed the patient's hemodynamic status  Tapering IV hydrocortisone\"    InD of stump abscess/cellulitis today  On perioperative high dose steroids  Echo PENDING  CT Chest showed bilateral opacities  Discuss with ID if we are to broaden antibiotics        Class 1 obesity due to excess calories without serious comorbidity with body mass index (BMI) of 32.0 to 32.9 in adult  Assessment & Plan  Recommended weight loss advised, 5% through reduced calorie, low carb diet and 150 mins of exercise a week once better  Recommend bariatric surgery evaluation if morbidly obese  Educated on the increase of morbidity and mortality associated with excess weight including DM, Heart Disease, HTN, stroke, and sleep apnea. Recommended outpatient monitoring  of blood sugars, lipid panel, sleep study evaluation for metabolic syndrome.      Abscess of left lower extremity- (present on admission)  Assessment & Plan  Status post debridement on 10/19 and he will require further debridement by ortho.    Bacteremia due to Staphylococcus aureus- (present on admission)  Assessment & Plan  MSSA bacteremia with blood cultures x2 positive  Echocardiogram has been ordered  Infectious disease has been consulted    Acquired planovalgus deformity of right foot- (present on admission)  Assessment & Plan  Charcot foot on the right side    Chronic diastolic heart failure with preserved ejection fraction (HCC)- (present on admission)  Assessment & Plan  Monitor for volume overload  He had been on lasix as an outpatient    Chronic pain syndrome- (present on admission)  Assessment & Plan  He is followed by [facility].  He is on morphine immediate release 30 mg 4 times a day and morphine sustained release 15 mg twice daily which will be restarted.  Continue Lyrica and Cymbalta.  Start oxycodone for moderate breakthrough pain and IV " Dilaudid for severe breakthrough pain.    Rheumatoid arthritis (HCC)- (present on admission)  Assessment & Plan  He has been on Humira in the past         VTE prophylaxis: Not on pharmacolohic DVT prophyalxis. Will start one if no contraindication.     I have performed a physical exam and reviewed and updated ROS and Plan today (10/22/2023). In review of yesterday's note (10/21/2023), there are no changes except as documented above.

## 2023-10-23 NOTE — PROGRESS NOTES
Ortho foot and ankle progress note    The patient was seen and evaluated at bedside    Given the findings of the left below-knee amputation stump site infection and subsequent irrigation debridement, sepsis, positive blood cultures it would be prudent to delay surgery on the right lower extremity in the setting of active infection as this may increase the risk of subsequent infection on the right lower extremity    I discussed this with the patient  All the patient's options were answered  We will cancel surgery this Wednesday  Touchdown weightbearing on the right lower extremity for transfers  We will coordinate follow-up as outpatient      Calvin Pryor MD

## 2023-10-23 NOTE — PROGRESS NOTES
"Hospital Medicine Daily Progress Note    Date of Service  10/23/2023    Chief Complaint  Richard Hubbard II is a 48 y.o. male admitted 10/18/2023 with ALOC (Pt found to be altered by wife. Pt reports feeling lethargic. Pt A+Ox4 but not answering further questioning. )    Hospital Course  No notes on file  48 y.o. male who presented 10/18/2023 with fever and infected lower extremity stump.  Mr. Hubbard has a past medical history of rheumatoid arthritis and recent below the knee amputation that was most recently admitted to [facility] then went to inpatient rehab 8/20/2023 through 9/5/2023 after work-up of congestive heart failure with preserved ejection fraction.  10/18/2023 he scented to the emergency room with altered level of consciousness and lethargy.  In the emergency room he was found to have a white blood cell count of 30,000 and a CT lower extremity revealed a enhancing fluid collection at the distal soft tissues at the below the knee amputation consistent with infection and osteomyelitis.  He was admitted in guarded condition to the intensive care unit for IV fluids, broad-spectrum IV antibiotics and management of septic shock.  On 10/19/2023 he underwent left below the knee amputation incision and drainage of an abscess with wound VAC placement.  Blood cultures are positive for MSSA x2.\"    Dr. Andrade 10/20/2023     Interval Problem Update  10/21. I reviewed the chart along with vitals, labs, imaging, test (both pending and resulted) and recommendations from specialists and interdisciplinary team.  HE has L BKA stump infection with wound vac and MSSA bacteremia. Echo is pending. I reviewed ID recommendations. Ordered CT chest  10/22. Planned for another InD for stump infection.abscess. Patient anticipating.   Echo PENDING  CT chest showed bilateral pulmonary opacities. Currently on antibiotics  Await OR cultures and discuss with ID.  Leukocytosis. Ordred labs.  10/23. Mild leukocytosis it has " trended down. Discussed with ID and vascular team. Keep cental line for now. Patient has some crackling on auscultation, L side as well as scrotal swelling. Echo EF %% and moderate aortic valve stenosis. Already had recent CT Chest no get but opacifications for possible pneumonia. On antibiotics, ID aware. Ordered scrotal US.    Patient is medically complex.    I have discussed this patient's plan of care and discharge plan at IDT rounds today with Case Management, Nursing, Nursing leadership, and other members of the IDT team.    Consultants/Specialty  CC admitted  Hospitalist  Orthopedics  ID    Code Status  Full Code    Disposition  The patient is not medically cleared for discharge to home or a post-acute facility.    I have placed the appropriate orders for post-discharge needs.    Review of Systems  Review of Systems   Constitutional:  Positive for fever. Negative for chills.   HENT:  Negative for congestion, hearing loss and nosebleeds.    Eyes:  Negative for pain and redness.   Respiratory:  Negative for cough, hemoptysis, shortness of breath and wheezing.    Cardiovascular:  Negative for chest pain and palpitations.   Gastrointestinal:  Negative for abdominal pain, blood in stool, constipation, diarrhea, nausea and vomiting.   Genitourinary:  Negative for dysuria, frequency and hematuria.   Musculoskeletal:  Negative for falls, joint pain and myalgias.   Skin:  Positive for rash.   Neurological:  Negative for dizziness, tremors, focal weakness, seizures, loss of consciousness, weakness and headaches.   Psychiatric/Behavioral:  The patient is not nervous/anxious and does not have insomnia.    All other systems reviewed and are negative.       Physical Exam  Temp:  [36.1 °C (97 °F)-36.7 °C (98.1 °F)] 36.4 °C (97.5 °F)  Pulse:  [] 71  Resp:  [12-20] 16  BP: ()/(50-64) 106/64  SpO2:  [92 %-100 %] 95 %    Physical Exam  Vitals and nursing note reviewed.   Constitutional:       Appearance: He is  obese.   HENT:      Head: Normocephalic and atraumatic.      Right Ear: External ear normal.      Left Ear: External ear normal.      Nose: Nose normal.      Mouth/Throat:      Mouth: Mucous membranes are moist.   Eyes:      General: No scleral icterus.     Conjunctiva/sclera: Conjunctivae normal.   Cardiovascular:      Rate and Rhythm: Normal rate and regular rhythm.      Heart sounds: Murmur heard.      No friction rub. No gallop.   Pulmonary:      Effort: Pulmonary effort is normal.      Breath sounds: Normal breath sounds.   Abdominal:      General: Abdomen is flat. Bowel sounds are normal. There is no distension.      Palpations: Abdomen is soft.      Tenderness: There is no abdominal tenderness. There is no guarding.      Comments: Ventral hernia   Genitourinary:     Comments: Scrotal swelling  Musculoskeletal:         General: Deformity (L BKA) present. Normal range of motion.      Cervical back: Normal range of motion and neck supple.      Comments: R Charcot foot   Skin:     General: Skin is warm.   Neurological:      Mental Status: He is alert and oriented to person, place, and time. Mental status is at baseline.   Psychiatric:         Mood and Affect: Mood normal.         Behavior: Behavior normal.         Thought Content: Thought content normal.         Judgment: Judgment normal.      Comments: Calm         Fluids    Intake/Output Summary (Last 24 hours) at 10/23/2023 0745  Last data filed at 10/23/2023 0400  Gross per 24 hour   Intake 1448.5 ml   Output 1140 ml   Net 308.5 ml         Laboratory  Recent Labs     10/23/23  0545   WBC 11.3*   RBC 3.41*   HEMOGLOBIN 9.9*   HEMATOCRIT 31.3*   MCV 91.8   MCH 29.0   MCHC 31.6*   RDW 57.1*   PLATELETCT 165   MPV 9.8       Recent Labs     10/23/23  0545   SODIUM 137   POTASSIUM 4.4   CHLORIDE 101   CO2 31   GLUCOSE 93   BUN 13   CREATININE 0.50   CALCIUM 7.6*                       Imaging  CT-CHEST (THORAX) W/O   Final Result      1.  Moderate size right pleural  effusion is identified.      2.  Probable consolidative atelectasis in the right lower pulmonary lobe.      3.  Bilateral pulmonary opacifications are identified as described above. Findings could be due to inflammation or infection. Pneumonitis and pneumonia are possibilities.      Fleischner Society pulmonary nodule recommendations:   Not Applicable         DX-CHEST-PORTABLE (1 VIEW)   Final Result         1.  Pulmonary edema and/or infiltrates are identified, which appear somewhat increased since the prior exam.   2.  Cardiomegaly      CT-EXTREMITY, LOWER WITH LEFT   Final Result         1.  Enhancing fluid collection at the distal soft tissues at below the knee amputation, appearance most compatible with abscess. Appears to communicate with the skin surface.   2.  Suspected abscess abuts the distal tibial stump concerning for changes of osteomyelitis.   3.  Diffuse subcutaneous fat stranding, compatible changes of edema and/or cellulitis.      CT-ABDOMEN-PELVIS WITH   Final Result         1.  4.6 cm mid abdominal hernia containing a portion of transverse without visualized obstructive changes   2.  Bilateral pulmonary infiltrates.   3.  Moderate right and trace left pleural effusion   4.  Diffuse subcutaneous fat stranding, appearance favoring changes of anasarca   5.  Atherosclerosis and atherosclerotic coronary artery disease      DX-TIBIA AND FIBULA LEFT   Final Result         1.  No acute traumatic bony injury.   2.  Soft tissue gas at the distal amputation site anteriorly, appearance suggests open wound and/or underlying infectious etiology. Correlate with exam.      DX-CHEST-PORTABLE (1 VIEW)   Final Result         1.  Pulmonary edema and/or infiltrates.   2.  Trace right pleural effusion   3.  Cardiomegaly      EC-ECHOCARDIOGRAM COMPLETE W/ CONT    (Results Pending)        Assessment/Plan  * Septic shock, MSSA bacteremia, L BKA stump abscess (HCC)- (present on admission)  Assessment & Plan  This is Septic  "shock Present on admission  SIRS criteria identified on my evaluation include: Leukocytosis, with WBC greater than 12,000  Clinical indicators of end organ dysfunction include Lactic Acid greater than 2  Indicators of septic shock include: Sepsis present and initial lactate level result is greater than or equal to 4mmol/L he required intravenous pressors  Sources is: Staph aureus bacteremia  Sepsis protocol initiated  Crystalloid Fluid Administration: Was given  IV antibiotics as appropriate for source of sepsis  Reassessment: I have reassessed the patient's hemodynamic status  Tapering IV hydrocortisone\"    InD of stump abscess/cellulitis today  On perioperative high dose steroids  Echo normal EF, moderate aortic stenosis, moderate AI, no mention of vegetations  Reorder his Lsix for swelling. Scrotal US for scrotal swelling  CT Chest showed bilateral opacities  ID aware.        Class 1 obesity due to excess calories without serious comorbidity with body mass index (BMI) of 32.0 to 32.9 in adult  Assessment & Plan  Recommended weight loss advised, 5% through reduced calorie, low carb diet and 150 mins of exercise a week once better  Recommend bariatric surgery evaluation if morbidly obese  Educated on the increase of morbidity and mortality associated with excess weight including DM, Heart Disease, HTN, stroke, and sleep apnea. Recommended outpatient monitoring  of blood sugars, lipid panel, sleep study evaluation for metabolic syndrome.      Abscess of left lower extremity- (present on admission)  Assessment & Plan  Status post debridement on 10/19 and he will require further debridement by ortho.    Bacteremia due to Staphylococcus aureus- (present on admission)  Assessment & Plan  MSSA bacteremia with blood cultures x2 positive  Echocardiogram has been ordered  Infectious disease has been consulted\"    See above    Acquired planovalgus deformity of right foot- (present on admission)  Assessment & Plan  Charcot " foot on the right side    Chronic diastolic heart failure with preserved ejection fraction (HCC)- (present on admission)  Assessment & Plan  Monitor for volume overload  He had been on lasix as an outpatient    Chronic pain syndrome- (present on admission)  Assessment & Plan  He is followed by [facility].  He is on morphine immediate release 30 mg 4 times a day and morphine sustained release 15 mg twice daily which will be restarted.  Continue Lyrica and Cymbalta.  Start oxycodone for moderate breakthrough pain and IV Dilaudid for severe breakthrough pain.    Rheumatoid arthritis (HCC)- (present on admission)  Assessment & Plan  He has been on Humira in the past         VTE prophylaxis: Not on pharmacolohic DVT prophyalxis. Will start one if no contraindication.     I have performed a physical exam and reviewed and updated ROS and Plan today (10/23/2023). In review of yesterday's note (10/22/2023), there are no changes except as documented above.

## 2023-10-23 NOTE — CARE PLAN
The patient is Stable - Low risk of patient condition declining or worsening    Shift Goals  Clinical Goals: Pain control, Safety  Patient Goals: Pain control  Family Goals: Not present    Progress made toward(s) clinical / shift goals:    Problem: Pain - Standard  Goal: Alleviation of pain or a reduction in pain to the patient’s comfort goal  Outcome: Progressing  Note: Patient educated on pain scale and to notify RN of pain. Medicated per MAR and repositioned        Problem: Knowledge Deficit - Standard  Goal: Patient and family/care givers will demonstrate understanding of plan of care, disease process/condition, diagnostic tests and medications  Outcome: Progressing  Note: Plan of care discussed, verbalized understanding. Questions answered        Problem: Hemodynamics  Goal: Patient's hemodynamics, fluid balance and neurologic status will be stable or improve  Outcome: Progressing     Problem: Skin Integrity  Goal: Skin integrity is maintained or improved  Outcome: Progressing     Problem: Fall Risk  Goal: Patient will remain free from falls  Outcome: Progressing       Patient is not progressing towards the following goals:

## 2023-10-23 NOTE — PROGRESS NOTES
"      Orthopaedic Progress Note    Interval changes:  Patient doing well post op  LLE dressings are CDI  Cleared for DC to SNF by ortho pending medicine clearance    ROS - Patient denies any new issues.  Pain well controlled.    BP 97/52   Pulse 75   Temp 36.7 °C (98.1 °F) (Temporal)   Resp 18   Ht 1.778 m (5' 10\")   Wt 104 kg (229 lb 15 oz)   SpO2 98%     Patient seen and examined  No acute distress  Breathing non labored  RRR  LLE dressing CDI, no contracture noted.     Recent Labs     10/23/23  0545   WBC 11.3*   RBC 3.41*   HEMOGLOBIN 9.9*   HEMATOCRIT 31.3*   MCV 91.8   MCH 29.0   MCHC 31.6*   RDW 57.1*   PLATELETCT 165   MPV 9.8       Active Hospital Problems    Diagnosis     Rheumatoid arthritis (HCC) [M06.9]      Priority: Low     On Humira      Class 1 obesity due to excess calories without serious comorbidity with body mass index (BMI) of 32.0 to 32.9 in adult [E66.09, Z68.32]     Bacteremia due to Staphylococcus aureus [R78.81, B95.61]     Abscess of left lower extremity [L02.416]     Acquired planovalgus deformity of right foot [M21.6X1]     Septic shock, MSSA bacteremia, L BKA stump abscess ([facility]) [A41.9, R65.21]     Chronic diastolic heart failure with preserved ejection fraction ([facility]) [I50.32]     Chronic pain syndrome [G89.4]     Chronic, continuous use of opioids [F11.90]     MONICA (obstructive sleep apnea) [G47.33]      Diagnosed around ~2010, previously intolerant of CPAP. Saw pulmonology 1/2021, did not follow through with sleep study for CPAP/BIPAP trial      Mood disorder ([facility]) [F39]      Mood disorder unspecified   On duloxetine 60mg for chronic pain  Report hx of diagnose with bipolar.       Insomnia [G47.00]        Assessment/Plan:  Patient doing well post op  LLE dressings are CDI  Cleared for DC to SNF by ortho pending medicine clearance  POD#1 S/P  Left BKA revision primary closure  Wt bearing status - NWB LLE  Wound care/Drains - Dressings to be changed every other day by nursing. Or " PRN for saturation starting POD#2  Future Procedures - none planned   Lovenox: Start 10/22, Duration-until ambulatory > 150'  Sutures/Staples out- 14-21 days post operatively. Removal will completed by ortho mid levels only.  PT/OT-initiated  Antibiotics: ancef 2g IV Q8  DVT Prophylaxis- TEDS/SCDs/Foot pumps  [place]-not needed per ortho  Case Coordination for Discharge Planning - Disposition per therapy recs.

## 2023-10-23 NOTE — DISCHARGE PLANNING
Received Choice form at 1642  Agency/Facility Name: [facility]  Referral sent per Choice form @ 165     Received Choice form at 1658  Agency/Facility Name: [facility]  Referral sent per Choice form @ [phone]

## 2023-10-24 NOTE — PROGRESS NOTES
Infectious Disease Progress Note    Author: Aury Silva M.D. Date & Time of service: 10/24/2023  10:52 AM    Chief Complaint:  MSSA bacteremia and BKA stump infection     Interval History:  10/21  AF, O2 RA, overall doing well, some pain at prior surgical site.  States his back pain has resolved today.      Review of Systems:  Review of Systems   Respiratory:  Negative for cough.    Gastrointestinal:  Negative for abdominal pain, constipation, diarrhea, nausea and vomiting.   Musculoskeletal:  Positive for joint pain and myalgias. Negative for back pain and neck pain.   Psychiatric/Behavioral:  The patient is not nervous/anxious.        Hemodynamics:  Temp (24hrs), Av.7 °C (98 °F), Min:36.6 °C (97.9 °F), Max:36.8 °C (98.2 °F)  Temperature: 36.6 °C (97.9 °F)  Pulse  Av.9  Min: 61  Max: 110   Blood Pressure: 103/53       Physical Exam:  Physical Exam  Cardiovascular:      Rate and Rhythm: Normal rate and regular rhythm.      Heart sounds: Murmur heard.   Pulmonary:      Effort: Pulmonary effort is normal.      Breath sounds: Normal breath sounds.   Abdominal:      General: Abdomen is flat. Bowel sounds are normal.      Palpations: Abdomen is soft.   Musculoskeletal:      Comments: Left BKA site with dressings in place, clean, dry and intact, tender to palpation   Skin:     General: Skin is warm and dry.   Neurological:      General: No focal deficit present.      Mental Status: He is oriented to person, place, and time.   Psychiatric:         Mood and Affect: Mood normal.         Behavior: Behavior normal.         Meds:    Current Facility-Administered Medications:     phosphorus    furosemide    enoxaparin (LOVENOX) injection    morphine    zolpidem    oxyCODONE immediate-release    ceFAZolin    Nozin nasal  swab    omeprazole    senna-docusate **AND** polyethylene glycol/lytes **AND** magnesium hydroxide **AND** bisacodyl    acetaminophen    HYDROmorphone    morphine ER    pregabalin     DULoxetine    Labs:  Recent Labs     10/23/23  0545 10/24/23  0423   WBC 11.3* 9.6   RBC 3.41* 3.29*   HEMOGLOBIN 9.9* 9.1*   HEMATOCRIT 31.3* 30.3*   MCV 91.8 92.1   MCH 29.0 27.7   RDW 57.1* 57.5*   PLATELETCT 165 141*   MPV 9.8 9.2     Recent Labs     10/23/23  0545 10/24/23  0423   SODIUM 137 139   POTASSIUM 4.4 4.4   CHLORIDE 101 103   CO2 31 33   GLUCOSE 93 83   BUN 13 11     Recent Labs     10/23/23  0545 10/24/23  0423   ALBUMIN 2.4* 2.2*   CREATININE 0.50 0.49*       Imaging:  [id number]    Result Date: 10/24/2023  10/24/2023 8:38 AM HISTORY/REASON FOR EXAM: Scrotal swelling. TECHNIQUE/EXAM DESCRIPTION: Real-time sonography of the scrotum was performed with gray-scale, color and duplex Doppler imaging. COMPARISON: None FINDINGS: The right testis measures 2.81 cm x 1.94 cm x 1.98 cm. Normal in size and echotexture. Normal vascularity on color Doppler. No intratesticular mass. The left testis measures 2.53 cm x 1.76 cm x 1.64 cm. Normal in size and echotexture. Normal vascularity on color Doppler. No intratesticular mass. Vascular flow is symmetric. Appearance of the epididymides are within normal limits. The right epididymis measures 0.90 cm. The left epididymis measures 1.18 cm Moderate right hydrocele. No varicocele is detected. Diffuse induration/soft tissue swelling of the scrotal wall. No discrete soft tissue abscess.     1.  Diffuse induration/soft tissue swelling of the scrotal wall which may represent cellulitis. No soft tissue abscess. 2.  Moderate right hydrocele.    EC-ECHOCARDIOGRAM COMPLETE W/ CONT    Result Date: 10/23/2023  Transthoracic Echo Report Echocardiography Laboratory CONCLUSIONS Prior study on 8/12/2023, compared to the report of the prior study, there has been no significant change. Normal left ventricular systolic function. The left ventricular ejection fraction is visually estimated to be 55%. Mild mitral regurgitation. Heavily calcified aortic valve, possibly a bicuspid.  Moderate aortic valve stenosis Vmax is 3.57 m/s. Aortic valve area calculated from the continuity equation is 1.2 cm2. Vmax is 3.57 m/s. Transvalvular gradients are - Peak: 50 mmHg,  Mean: 28 mmHg. Moderate aortic insufficiency. Mild tricuspid regurgitation. Estimated right ventricular systolic pressure is 45 mmHg. LESLIE BADILLO Exam Date:         10/20/2023                    10:58 Exam Location:     Inpatient Priority:          Routine Ordering Physician:        SHAI BARRIENTOS Referring Physician: Sonographer:               Gee Alfredo                            RDCS, RVT Age:    48     Gender:    M MRN:    [id number] :    1975 BSA:    2.22   Ht (in):    70     Wt (lb):    230 Exam Type:     Complete, Contrast Indications:     Acute and subacute endocarditis, unspecified ICD Codes:       I33.9 CPT Codes:       92368,  BP:   97     /   82     HR:   87 Technical Quality:       Poor MEASUREMENTS  (Male / Female) Normal Values 2D ECHO LV Diastolic Diameter PLAX        6 cm                  4.2 - 5.9 / 3.9 - 5.3 cm LV Systolic Diameter PLAX         3.8 cm                2.1 - 4.0 cm IVS Diastolic Thickness           1 cm                  LVPW Diastolic Thickness          1 cm                  LVOT Diameter                     2.5 cm                Estimated LV Ejection Fraction    55 %                  DOPPLER AV Peak Velocity                  3.4 m/s               AV Peak Gradient                  47 mmHg               AV Mean Gradient                  28.4 mmHg             AI Pressure Half Time             255 ms                LVOT Peak Velocity                0.93 m/s              AV Area Cont Eq vti               1.7 cm2               MV Velocity Time Integral         27.1 cm               Mitral E Point Velocity           0.92 m/s              Mitral E to A Ratio               1.7                   Mitral A Duration                 101 ms                MV Pressure Half Time             28.7  ms               MV Area PHT                       7.7 cm2               MV Deceleration Time              98.8 ms               Pulmonary Vein Systolic Velocity  0.45 m/s              Pulmonary Vein Diastolic Velocit  0.67 m/s              Pulmonary Vein S/D Ratio          0.66                  Pulmonary Vein A Velocity         0.35 m/s              Pulmonary Vein A Duration         168 ms                Pulm Vein-MV A Duration           66.9 ms               TR Peak Velocity                  294 cm/s              PV Peak Velocity                  0.87 m/s              PV Peak Gradient                  3 mmHg                PV Mean Gradient                  1.8 mmHg              RVOT Peak Velocity                0.8 m/s               * Indicates values subject to auto-interpretation LV EF:  55    % FINDINGS Left Ventricle 3 mL of contrast was used to enhance visualization of the endocardial border. Existing IV was used at the right antecubital. The left ventricle is mildly dilated. Normal left ventricular wall thickness. Normal left ventricular systolic function. The left ventricular ejection fraction is visually estimated to be 55%. No regional wall motion abnormalities. Grade I diastolic dysfunction. Right Ventricle Normal right ventricular size and systolic function. Right Atrium Enlarged right atrium. Normal inferior vena cava size and inspiratory collapse. Left Atrium Severely dilated left atrium. Left atrial volume index is 56 mL/sq m. Mitral Valve Structurally normal mitral valve. Mild mitral regurgitation. Aortic Valve The aortic valve is not well visualized. Heavily calcified aortic valve, possibly a bicuspid. Moderate aortic valve stenosis Vmax is 3.57 m/s. Transvalvular gradients are - Peak: 50 mmHg,  Mean: 28 mmHg. Aortic valve area calculated from the continuity equation is 1.2 cm2. The left ventricular index is 62 ml/m2 with a dimensionless index of 0.26.   Moderate aortic insufficiency. Pressure half  time is 297 msec. Tricuspid Valve The tricuspid valve is not well visualized. Mild tricuspid regurgitation. Estimated right ventricular systolic pressure is 45 mmHg. Pulmonic Valve The pulmonic valve is not well visualized. Trace pulmonic insufficiency. Pericardium No pericardial effusion. Aorta The aortic root is dilated with a diameter of  4.5 cm. Chava Naidu M.D. (Electronically Signed) Final Date:     23 October 2023                 12:48    CT-CHEST (THORAX) W/O    Result Date: 10/21/2023  10/21/2023 2:11 PM HISTORY/REASON FOR EXAM:  evaluate for infection. Sepsis. TECHNIQUE/EXAM DESCRIPTION: CT scan of the chest without contrast. Noncontrast helical scanning of the chest was obtained from the lung apices through the adrenal glands. Low dose optimization technique was utilized for this CT exam including automated exposure control and adjustment of the mA and/or kV according to patient size. COMPARISON: 11/05/2019 FINDINGS: Lungs: Multifocal opacifications with irregular borders and poorly defined borders are identified in each lung which are most extensive in the left upper pulmonary lobe. There is consolidation and volume loss in the right lower pulmonary lobe. Mediastinum/Ijeoma: No significant adenopathy. Pleura: Moderate size right pleural effusion. Cardiac: Mild cardiomegaly. Vascular: Unremarkable. Soft tissues: Unremarkable. Bones: No acute or destructive process.     1.  Moderate size right pleural effusion is identified. 2.  Probable consolidative atelectasis in the right lower pulmonary lobe. 3.  Bilateral pulmonary opacifications are identified as described above. Findings could be due to inflammation or infection. Pneumonitis and pneumonia are possibilities. Fleischner Society pulmonary nodule recommendations: Not Applicable     CT-EXTREMITY, LOWER WITH LEFT    Result Date: 10/18/2023    10/18/2023 10:12 PM HISTORY/REASON FOR EXAM:  Altered; Left stump abscess deep space infection. TECHNIQUE/EXAM  DESCRIPTION AND NUMBER OF VIEWS:  CT scan of the LEFT lower extremity with contrast, with reconstructions. Thin helical 3 mm sections were obtained from the distal femur through the proximal tibia/fibula. Sagittal and coronal multiplanar reconstructions were generated from the axial images. A total of 100 mL of Omnipaque 350 nonionic contrast was administered  IV without complication. Up to date radiation dose reduction adjustments have been utilized to meet ALARA standards for radiation dose reduction. COMPARISON: February 28, 2023 FINDINGS: Limited views of the small bowel and colon appear unremarkable. The bladder appears within normal limits. There is bony demineralization noted. Left below the knee amputation is observed. There is 5.7 x 4.5 cm peripherally enhancing fluid collection identified at the distal margin of the tibial stump, associated foci of air are seen anteriorly with probable anterior wound. Diffuse subcutaneous fat stranding is seen.     1.  Enhancing fluid collection at the distal soft tissues at below the knee amputation, appearance most compatible with abscess. Appears to communicate with the skin surface. 2.  Suspected abscess abuts the distal tibial stump concerning for changes of osteomyelitis. 3.  Diffuse subcutaneous fat stranding, compatible changes of edema and/or cellulitis.    DX-CHEST-PORTABLE (1 VIEW)    Result Date: 10/18/2023    10/18/2023 10:59 PM HISTORY/REASON FOR EXAM: line placement TECHNIQUE/EXAM DESCRIPTION:  Single AP view of the chest. COMPARISON: Today at 2120 FINDINGS: Right internal jugular central line is seen terminating within the right atrium.   Cardiomegaly is observed. The mediastinal contour appears within normal limits.  The central  pulmonary vasculature appears prominent and indistinct. Bilateral lung volumes are diminished.  Diffuse scattered hazy pulmonary parenchymal opacities are seen. No significant pleural effusions are identified. The bony structures  appear age-appropriate.     1.  Pulmonary edema and/or infiltrates are identified, which appear somewhat increased since the prior exam. 2.  Cardiomegaly    CT-ABDOMEN-PELVIS WITH    Result Date: 10/18/2023    10/18/2023 10:12 PM HISTORY/REASON FOR EXAM:  Pain. TECHNIQUE/EXAM DESCRIPTION:   CT scan of the abdomen and pelvis with contrast. Contrast-enhanced helical scanning was obtained from the diaphragmatic domes through the pubic symphysis following the bolus administration of nonionic contrast without complication. 100 mL of Omnipaque 350 nonionic contrast was administered without complication. Low dose optimization technique was utilized for this CT exam including automated exposure control and adjustment of the mA and/or kV according to patient size. COMPARISON: No prior studies available. FINDINGS: Lower Chest: Scattered bilateral pulmonary consolidations are seen. Moderate right and trace left pleural effusion is seen. Liver: Normal. Spleen: Unremarkable. Pancreas: Unremarkable. Gallbladder: No calcified stones. Biliary: Nondilated. Adrenal glands: Normal. Kidneys: Unremarkable without hydronephrosis. Bowel: No obstruction or acute inflammation. 4.6 cm mid abdominal hernia seen containing a portion of the transverse colon. Lymph nodes: No adenopathy. Vasculature: Atherosclerotic changes are seen including atherosclerotic coronary artery calcifications.. Peritoneum: Unremarkable without ascites. Musculoskeletal: No acute or destructive process. Diffuse subcutaneous fat stranding is seen. Pelvis: No adenopathy or free fluid.     1.  4.6 cm mid abdominal hernia containing a portion of transverse without visualized obstructive changes 2.  Bilateral pulmonary infiltrates. 3.  Moderate right and trace left pleural effusion 4.  Diffuse subcutaneous fat stranding, appearance favoring changes of anasarca 5.  Atherosclerosis and atherosclerotic coronary artery disease    DX-TIBIA AND FIBULA LEFT    Result Date:  10/18/2023  10/18/2023 9:28 PM HISTORY/REASON FOR EXAM: Atraumatic Pain/Swelling/Deformity; From the knee down please and thank you TECHNIQUE/EXAM DESCRIPTION:  AP and lateral views of the LEFT tibia and fibula. COMPARISON:  None FINDINGS: Postsurgical changes of below-the-knee amputation are seen. There is soft tissue gas identified at the distal site anteriorly.     1.  No acute traumatic bony injury. 2.  Soft tissue gas at the distal amputation site anteriorly, appearance suggests open wound and/or underlying infectious etiology. Correlate with exam.    DX-CHEST-PORTABLE (1 VIEW)    Result Date: 10/18/2023    10/18/2023 9:12 PM HISTORY/REASON FOR EXAM: possible sepsis. TECHNIQUE/EXAM DESCRIPTION:  Single AP view of the chest. COMPARISON: September 1, 2023 FINDINGS: Cardiomegaly is observed. The mediastinal contour appears within normal limits.  The central  pulmonary vasculature appears prominent and indistinct. Bilateral lung volumes are diminished.  Diffuse scattered hazy pulmonary parenchymal opacities are seen. Mild blunting of the right costophrenic angle is seen suggesting small right effusion. The bony structures appear age-appropriate.     1.  Pulmonary edema and/or infiltrates. 2.  Trace right pleural effusion 3.  Cardiomegaly    DX-FOOT-2- RIGHT    Result Date: 10/10/2023  Independent review of the patient's x-rays of the right ankle and foot weightbearing taken today including AP, lateral and oblique views demonstrate significant and severe pes planovalgus deformity.  There is subfibular impingement and contact of the calcaneus adjacent to the distal fibula, subluxation of the subtalar as well as the talonavicular joints.  No acute fractures are noted.    DX-ANKLE 3+ VIEWS RIGHT    Result Date: 10/10/2023  Independent review of the patient's x-rays of the right ankle and foot weightbearing taken today including AP, lateral and oblique views demonstrate significant and severe pes planovalgus deformity.   There is subfibular impingement and contact of the calcaneus adjacent to the distal fibula, subluxation of the subtalar as well as the talonavicular joints.  No acute fractures are noted.      Micro:        Assessment:  Active Hospital Problems    Diagnosis     *Septic shock, MSSA bacteremia, L BKA stump abscess ([facility]) [A41.9, R65.21]     Class 1 obesity due to excess calories without serious comorbidity with body mass index (BMI) of 32.0 to 32.9 in adult [E66.09, Z68.32]     Bacteremia due to Staphylococcus aureus [R78.81, B95.61]     Abscess of left lower extremity [L02.416]     Acquired planovalgus deformity of right foot [M21.6X1]     Chronic diastolic heart failure with preserved ejection fraction (HCC) [I50.32]     Chronic pain syndrome [G89.4]     Rheumatoid arthritis ([facility]) [M06.9]     Chronic, continuous use of opioids [F11.90]     MONICA (obstructive sleep apnea) [G47.33]     Mood disorder ([facility]) [F39]     Insomnia [G47.00]      Interval 24 hours:      AF, O2 2 L NC, 96%  Labs reviewed to assess for clinical status, drug toxicity and organ function  Imaging personally reviewed both images and report.   Micro reviewed    Patient with pain in the left BKA site.  No significant cough or shortness of breath.  Denies any back pain.  Continued on cefazolin as below.     ASSESSMENT/PLAN:      48 y.o.  admitted 10/18/2023. Pt has a past medical history of RA on Humira per notes.  Patient also with history of chronic bilateral foot ulcers ongoing since 2022.  He was last seen by ID in 3/23 for group A strep bacteremia at leg tenosynovitis with left foot osteomyelitis.  He underwent left BKA on 3/9/2023.  He presented complaining of altered mentation and fevers.     Hospital Course:   Febrile to 102.2 on arrival, leukocytosis on arrival and ongoing.  CT abdomen pelvis relatively unremarkable but lower lung bases with infiltrates.  CT leg with enhancing fluid collection below-knee amputation, compatible with abscess.   Abscess abuts the distal tibial stump concerning for changes of osteomyelitis.     Problem List  Bacteremia  -Blood cultures on 10/18 +MSSA   -Blood cultures on 10/20 are no growth to date  -TTE on 10/23 with no vegetations, heavily calcified aortic valve positive bicuspid, moderate aortic valve stenosis, moderate aortic insufficiency, aortic root is dilated to 4.5 cm.  Left BKA stump infection, abscess, osteomyelitis  -OR on 10/19 for I&D of abscess, down to level of bone, OR cultures + Streptococcus agalactiae and MSSA  -OR again on 10/22 for revision of left BKA abscess and primary closure, OR cultures are so far no growth  Pneumonia, moderate-sized right pleural effusion  -CT chest noncontrast on 10/21 with pleural effusion, bilateral pulmonary opacifications pneumonitis or pneumonia possible  Rheumatoid arthritis, on immunotherapy  Immune suppression  History of gastric bypass  Alcohol abuse  CHF with moderate AAS and MR  History of left BKA  Right foot planovalgus deformation  Thoracic area back pain, resolved  Scrotal edema, R hydrocele noted on scrotal ultrasound     Plan:    Recommendations for antibiotics:   --- Continue cefazolin 2 g every 8 hours given lab confirmation of MSSA infection, the coag negative staph is likely contaminant but will continue to follow-up blood cultures  --- Cefazolin also treat the Streptococcus from wound culture  --- Recommend a 6-week IV antibiotic course for bacteremia and osteomyelitis, end 12/1/23      Recommendations for further/ongoing work-up:   --- Follow-up blood cultures   --- Follow-up OR cultures  --- Blood cultures cleared quickly and we have a source so will not recommend MIA.  If any repeat positive blood cultures or signs or symptoms of endocarditis will request MIA.  Patient will be given a 6-week antibiotic course regardless for osteomyelitis.  --- Continue to monitor for any new head, neck back or joint pain and image appropriately   --- Monitor labs      Recommendations for intervention:   --- Plan is for additional surgical procedures       Dispo: Awaiting culture results and work-up as above.  If no further surgical procedures are planned then we will need to begin discussions as to whether he will be going to rehab, skilled facility or to home.  Please notify ID and I can place appropriate orders.    PICC: Okay to place PICC line, ordered      Plan of care discussed with Dr. Kennedy. Will continue to follow     Aury Silva M.D.     ADDENDA     Plan is for discharge to rehab.  Recommend follow-up in ID clinic prior to stopping antibiotics, okay to be seen by JEFFY Tyler.    ID will sign off

## 2023-10-24 NOTE — CARE PLAN
The patient is Stable - Low risk of patient condition declining or worsening    Shift Goals  Clinical Goals: IV abx, PICC placement, pain control  Patient Goals: pain control  Family Goals: not present    Progress made toward(s) clinical / shift goals:      Problem: Pain - Standard  Goal: Alleviation of pain or a reduction in pain to the patient’s comfort goal  Outcome: Progressing     Problem: Knowledge Deficit - Standard  Goal: Patient and family/care givers will demonstrate understanding of plan of care, disease process/condition, diagnostic tests and medications  Outcome: Progressing       Patient is not progressing towards the following goals:

## 2023-10-24 NOTE — PROGRESS NOTES
"      Orthopaedic Progress Note    Interval changes:  Patient doing well    Cleared for DC to SNF by ortho pending medicine clearance    ROS - Patient denies any new issues.  Pain well controlled.    /63   Pulse 82   Temp 36.6 °C (97.9 °F) (Temporal)   Resp 16   Ht 1.778 m (5' 10\")   Wt 104 kg (229 lb 15 oz)   SpO2 90%     Patient seen and examined  No acute distress  Breathing non labored  RRR  LLE dressing CDI, no contracture noted.     Recent Labs     10/23/23  0545 10/24/23  0423   WBC 11.3* 9.6   RBC 3.41* 3.29*   HEMOGLOBIN 9.9* 9.1*   HEMATOCRIT 31.3* 30.3*   MCV 91.8 92.1   MCH 29.0 27.7   MCHC 31.6* 30.0*   RDW 57.1* 57.5*   PLATELETCT 165 141*   MPV 9.8 9.2       Active Hospital Problems    Diagnosis     Rheumatoid arthritis ([facility]) [M06.9]      Priority: Low     On Humira      Class 1 obesity due to excess calories without serious comorbidity with body mass index (BMI) of 32.0 to 32.9 in adult [E66.09, Z68.32]     Bacteremia due to Staphylococcus aureus [R78.81, B95.61]     Abscess of left lower extremity [L02.416]     Acquired planovalgus deformity of right foot [M21.6X1]     Septic shock, MSSA bacteremia, L BKA stump abscess ([facility]) [A41.9, R65.21]     Chronic diastolic heart failure with preserved ejection fraction ([facility]) [I50.32]     Chronic pain syndrome [G89.4]     Chronic, continuous use of opioids [F11.90]     MONICA (obstructive sleep apnea) [G47.33]      Diagnosed around ~2010, previously intolerant of CPAP. Saw pulmonology 1/2021, did not follow through with sleep study for CPAP/BIPAP trial      Mood disorder ([facility]) [F39]      Mood disorder unspecified   On duloxetine 60mg for chronic pain  Report hx of diagnose with bipolar.       Insomnia [G47.00]        Assessment/Plan:  Patient doing well    LLE dressings are CDI  Cleared for DC to SNF by ortho pending medicine clearance  POD#2 S/P  Left BKA revision primary closure  Wt bearing status - NWB LLE  Wound care/Drains - Dressings to be " changed every other day by nursing. Or PRN for saturation starting POD#2  Future Procedures - none planned   Lovenox: Start 10/22, Duration-until ambulatory > 150'  Sutures/Staples out- 14-21 days post operatively. Removal will completed by ortho mid levels only.  PT/OT-initiated  Antibiotics: ancef 2g IV Q8  DVT Prophylaxis- TEDS/SCDs/Foot pumps  [place]-not needed per ortho  Case Coordination for Discharge Planning - Disposition per therapy recs.

## 2023-10-24 NOTE — DISCHARGE PLANNING
"[facility] TCN chart review completed. Collaborated with LINSEY Almaraz prior to meeting with the pt. The most current review of medical record, knowledge of pt's PLOF and social support, LACE+ score of 77 and 6 clicks scores of 7 for ADLs and 6 for mobility were considered. Per chart review, patient currently on IV ABX and has wound vac. Infectious disease is following. His scheduled surgery for R leg for this Wednesday was cancelled. Per chart review, patient had L BKA I&D of an abscess with wound vac placement. Patient also MSSA bacteremia positive in stump. He is on 2L O2 (none at baseline).    Per surgeon Dr. Marquez's note, \"patient cleared for discharge to SNF by ortho pending medical clearance.    Pt seen at bedside. Introduced TCN program. Provided education regarding post acute levels of care. Discussed [facility] plan benefits. Pt verbalizes understanding. Patient lives with his spouse Katiana and 2 young kids in [place], [place]. He recently [facility]Sept [facility]. He uses a prosthetic on L leg with FWW. He also use W/C when tired. He owns bedside commode and tub/shower seat. He is amenable to Ltach or IRF placement, but is declining to discuss SNF placement at this time. Appreciate PT/OT consults placed. Per surgeon note today \"PT/OT initiated.\"    Choice proactively obtained for IRF and Ltach and faxed to [facility] to be saved in media. TCN will continue to follow and collaborate with discharge planning team as additional post acute needs arise. Thank you.     Completed today:  PT/OT consults not in place as of time writing this note; TCN/CM to follow up with MD and request PT/OT orders Tuesday   Choice obtained: IRF ([facility] acute rehab ); [facility]ach ([facility])  "

## 2023-10-24 NOTE — PROCEDURES
Vascular Access Team     Date of Insertion: October 24, 2023  Arm Circumference: 35 cm  Internal length: 46 cm  External Length: 0 cm  Vein Occupancy %: 21%   Reason for PICC: Long term antibiotics  Labs: WBC 9.6, , BUN 11, Cr 0.49, , INR n/A     Consents confirmed, vessel patency confirmed with ultrasound. Risks and benefits of procedure explained to patient and education regarding central line associated bloodstream infections provided. Questions answered.      PICC placed in lue per licensed provider order with ultrasound guidance.  4 Fr, single lumen PICC placed in brachial vein after 1 attempt(s). 2 mL of 1% lidocaine injected intradermally at the insertion site. A 21 gauge microintroducer needle was visualized entering the vein and modified Seldinger technique was used to obtain access to the vein. 46 cm catheter inserted and brisk blood return was observed from each lumen upon aspiration. Line secured at the 0 cm marker. TCS stylet removed and observed to be fully intact. Each lumen flushed using pulsatile method without resistance with 10 mL 0.9% normal saline. PICC line secured with Biopatch and Tegaderm.     PICC tip placement location is confirmed by nurse to be in the Superior Vena Cava (SVC) utilizing 3CG technology. PICC line is appropriate for use at this time. Patient tolerated procedure well, without complications.  Patient condition relayed to primary RN or ordering physician via this post procedure note in the EMR.      Ultrasound images uploaded to PACS and viewable in the EMR - yes  Ultrasound imaged printed and placed in paper chart - no     [employer] Power PICC ref # [id number], Lot # [id number], Expiration Date December 31, 2024

## 2023-10-24 NOTE — CARE PLAN
The patient is Stable - Low risk of patient condition declining or worsening    Shift Goals  Clinical Goals: Pain control, labs, safety  Patient Goals: Pain control, sleep  Family Goals: JI    Progress made toward(s) clinical / shift goals:    Problem: Knowledge Deficit - Standard  Goal: Patient and family/care givers will demonstrate understanding of plan of care, disease process/condition, diagnostic tests and medications  Description: Target End Date:  1-3 days or as soon as patient condition allows    Document in Patient Education    1.  Patient and family/caregiver oriented to unit, equipment, visitation policy and means for communicating concern  2.  Complete/review Learning Assessment  3.  Assess knowledge level of disease process/condition, treatment plan, diagnostic tests and medications  4.  Explain disease process/condition, treatment plan, diagnostic tests and medications  Outcome: Progressing     Problem: Respiratory  Goal: Patient will achieve/maintain optimum respiratory ventilation and gas exchange  Description: Target End Date:  Prior to discharge or change in level of care    Document on Assessment flowsheet    1.  Assess and monitor rate, rhythm, depth and effort of respiration  2.  Breath sounds assessed qshift and/or as needed  3.  Assess O2 saturation, administer/titrate oxygen as ordered  4.  Position patient for maximum ventilatory efficiency  5.  Turn, cough, and deep breath with splinting to improve effectiveness  6.  Collaborate with RT to administer medication/treatments per order  7.  Encourage use of incentive spirometer and encourage patient to cough after use and utilize splinting techniques if applicable  8.  Airway suctioning  9.  Monitor sputum production for changes in color, consistency and frequency  10. Perform frequent oral hygiene  11. Alternate physical activity with rest periods  Outcome: Progressing       Patient is not progressing towards the following  goals:      Problem: Pain - Standard  Goal: Alleviation of pain or a reduction in pain to the patient’s comfort goal  Description: Target End Date:  Prior to discharge or change in level of care    Document on Vitals flowsheet    1.  Document pain using the appropriate pain scale per order or unit policy  2.  Educate and implement non-pharmacologic comfort measures (i.e. relaxation, distraction, massage, cold/heat therapy, etc.)  3.  Pain management medications as ordered  4.  Reassess pain after pain med administration per policy  5.  If opiods administered assess patient's response to pain medication is appropriate per POSS sedation scale  6.  Follow pain management plan developed in collaboration with patient and interdisciplinary team (including palliative care or pain specialists if applicable)  Outcome: Not Progressing

## 2023-10-25 NOTE — PROGRESS NOTES
Bedside report received, assessment completed    A&O x  4, pt calls appropriately  Mobility: Up with x1 assist  Fall Risk Assessment: low, bed alarm n/a, door notifications yes  Pain Assessment / Reassessment completed, medication provided per MAR  Diet: Regular  LDA:   IV Access: PICC LUE, CDI/ flushed/ SL  GI/: piedra void, + flatus, 10/23 BM  DVT Prophylaxis: Lovenox, SCD on while in bed   Nam Score: 18, Interventions per flow sheet  Procedures:    - 10/19 I&D   - 10/22 LLE BKA Revision   D/C Plan:    - PT/OT evaluation and treatment   - Wound Care LLE    - ID consult/ treatment    - Pain MGT     Reviewed plan of care with patient, bed in lowest position and locked, pt resting comfortably now, call light within reach, all needs met at this time. Interventions will be executed per plan of care

## 2023-10-25 NOTE — PROGRESS NOTES
"[facility] Medicine Daily Progress Note    Date of Service  10/25/2023    Chief Complaint  Richard Hubbard II is a 48 y.o. male admitted 10/18/2023 with ALOC (Pt found to be altered by wife. Pt reports feeling lethargic. Pt A+Ox4 but not answering further questioning. )    Hospital Course  48 y.o. male who presented 10/18/2023 with fever and infected lower extremity stump.  Mr. Hubbard has a past medical history of rheumatoid arthritis and recent below the knee amputation that was most recently admitted to [facility] then went to inpatient rehab 8/20/2023 through 9/5/2023 after work-up of congestive heart failure with preserved ejection fraction.  10/18/2023 he scented to the emergency room with altered level of consciousness and lethargy.  In the emergency room he was found to have a white blood cell count of 30,000 and a CT lower extremity revealed a enhancing fluid collection at the distal soft tissues at the below the knee amputation consistent with infection and osteomyelitis.  He was admitted in guarded condition to the intensive care unit for IV fluids, broad-spectrum IV antibiotics and management of septic shock.  On 10/19/2023 he underwent left below the knee amputation incision and drainage of an abscess with wound VAC placement.  Blood cultures are positive for MSSA x2.\"    Dr. Andrade 10/20/2023    10/21. I reviewed the chart along with vitals, labs, imaging, test (both pending and resulted) and recommendations from specialists and interdisciplinary team.  HE has L BKA stump infection with wound vac and MSSA bacteremia. Echo is pending. I reviewed ID recommendations. Ordered CT chest  10/22. Planned for another InD for stump infection.abscess. Patient anticipating.   Echo PENDING  CT chest showed bilateral pulmonary opacities. Currently on antibiotics  Await OR cultures and discuss with ID.  Leukocytosis. Ordred labs.  10/23. Mild leukocytosis it has trended down. Discussed with ID and vascular " team. Keep cental line for now. Patient has some crackling on auscultation, L side as well as scrotal swelling. Echo EF %% and moderate aortic valve stenosis. Already had recent CT Chest no get but opacifications for possible pneumonia. On antibiotics, ID aware. Ordered scrotal US.     Interval Problem Update  Patient was evaluated bedside  In no acute distress this morning  Stable vitals  On 1 L liters nasal cannula, titrate  Infect disease following and recommending total of 6 weeks of IV antibiotics which are to end on 12/1/2023  PICC line ordered per ID  Pending PT/OT evaluate  Labs on AM    I have discussed this patient's plan of care and discharge plan at IDT rounds today with Case Management, Nursing, Nursing leadership, and other members of the IDT team.    Consultants/Specialty  CC admitted  Hospitalist  Orthopedics  ID    Code Status  Full Code    Disposition  The patient is medically cleared for discharge to home or a post-acute facility.    Pending PT/OT evaluation    I have placed the appropriate orders for post-discharge needs.    Review of Systems  Review of Systems   Constitutional:  Positive for fever. Negative for chills.   HENT:  Negative for congestion, hearing loss and nosebleeds.    Eyes:  Negative for pain and redness.   Respiratory:  Negative for cough, hemoptysis, shortness of breath and wheezing.    Cardiovascular:  Negative for chest pain and palpitations.   Gastrointestinal:  Negative for abdominal pain, blood in stool, constipation, diarrhea, nausea and vomiting.   Genitourinary:  Negative for dysuria, frequency and hematuria.   Musculoskeletal:  Negative for falls, joint pain and myalgias.   Skin:  Positive for rash.   Neurological:  Negative for dizziness, tremors, focal weakness, seizures, loss of consciousness, weakness and headaches.   Psychiatric/Behavioral:  The patient is not nervous/anxious and does not have insomnia.    All other systems reviewed and are negative.        Physical Exam  Temp:  [36.6 °C (97.9 °F)-36.9 °C (98.4 °F)] 36.7 °C (98.1 °F)  Pulse:  [81-89] 89  Resp:  [16] 16  BP: (108-120)/(56-67) 108/67  SpO2:  [90 %-98 %] 91 %    Physical Exam  Vitals and nursing note reviewed.   Constitutional:       Appearance: He is obese.   HENT:      Head: Normocephalic and atraumatic.      Right Ear: External ear normal.      Left Ear: External ear normal.      Nose: Nose normal.      Mouth/Throat:      Mouth: Mucous membranes are moist.   Eyes:      General: No scleral icterus.     Conjunctiva/sclera: Conjunctivae normal.   Cardiovascular:      Rate and Rhythm: Normal rate and regular rhythm.      Heart sounds: Murmur heard.      No friction rub. No gallop.   Pulmonary:      Effort: Pulmonary effort is normal.      Breath sounds: Normal breath sounds.   Abdominal:      General: Abdomen is flat. Bowel sounds are normal. There is no distension.      Palpations: Abdomen is soft.      Tenderness: There is no abdominal tenderness. There is no guarding.      Comments: Ventral hernia   Genitourinary:     Comments: Scrotal swelling  Musculoskeletal:         General: Deformity (L BKA) present. Normal range of motion.      Cervical back: Normal range of motion and neck supple.      Comments: R Charcot foot   Skin:     General: Skin is warm.   Neurological:      Mental Status: He is alert and oriented to person, place, and time. Mental status is at baseline.   Psychiatric:         Mood and Affect: Mood normal.         Behavior: Behavior normal.         Thought Content: Thought content normal.         Judgment: Judgment normal.      Comments: Calm         Fluids    Intake/Output Summary (Last 24 hours) at 10/25/2023 1459  Last data filed at 10/25/2023 0841  Gross per 24 hour   Intake 120 ml   Output 74316 ml   Net -74251 ml       Laboratory  Recent Labs     10/23/23  0545 10/24/23  0423 10/25/23  0500   WBC 11.3* 9.6 11.5*   RBC 3.41* 3.29* 3.54*   HEMOGLOBIN 9.9* 9.1* 10.1*   HEMATOCRIT  31.3* 30.3* 32.1*   MCV 91.8 92.1 90.7   MCH 29.0 27.7 28.5   MCHC 31.6* 30.0* 31.5*   RDW 57.1* 57.5* 58.0*   PLATELETCT 165 141* 142*   MPV 9.8 9.2 10.2     Recent Labs     10/23/23  0545 10/24/23  0423 10/25/23  0500   SODIUM 137 139 137   POTASSIUM 4.4 4.4 4.5   CHLORIDE 101 103 100   CO2 31 33 31   GLUCOSE 93 83 74   BUN 13 11 10   CREATININE 0.50 0.49* 0.54   CALCIUM 7.6* 7.4* 7.7*                     Imaging  IR-PICC LINE PLACEMENT W/ GUIDANCE > AGE 5   Final Result                  Ultrasound-guided PICC placement performed by qualified nursing staff as    above.          [id number]   Final Result      1.  Diffuse induration/soft tissue swelling of the scrotal wall which may represent cellulitis. No soft tissue abscess.   2.  Moderate right hydrocele.      CT-CHEST (THORAX) W/O   Final Result      1.  Moderate size right pleural effusion is identified.      2.  Probable consolidative atelectasis in the right lower pulmonary lobe.      3.  Bilateral pulmonary opacifications are identified as described above. Findings could be due to inflammation or infection. Pneumonitis and pneumonia are possibilities.      Fleischner Society pulmonary nodule recommendations:   Not Applicable         EC-ECHOCARDIOGRAM COMPLETE W/ CONT   Final Result      DX-CHEST-PORTABLE (1 VIEW)   Final Result         1.  Pulmonary edema and/or infiltrates are identified, which appear somewhat increased since the prior exam.   2.  Cardiomegaly      CT-EXTREMITY, LOWER WITH LEFT   Final Result         1.  Enhancing fluid collection at the distal soft tissues at below the knee amputation, appearance most compatible with abscess. Appears to communicate with the skin surface.   2.  Suspected abscess abuts the distal tibial stump concerning for changes of osteomyelitis.   3.  Diffuse subcutaneous fat stranding, compatible changes of edema and/or cellulitis.      CT-ABDOMEN-PELVIS WITH   Final Result         1.  4.6 cm mid abdominal  "hernia containing a portion of transverse without visualized obstructive changes   2.  Bilateral pulmonary infiltrates.   3.  Moderate right and trace left pleural effusion   4.  Diffuse subcutaneous fat stranding, appearance favoring changes of anasarca   5.  Atherosclerosis and atherosclerotic coronary artery disease      DX-TIBIA AND FIBULA LEFT   Final Result         1.  No acute traumatic bony injury.   2.  Soft tissue gas at the distal amputation site anteriorly, appearance suggests open wound and/or underlying infectious etiology. Correlate with exam.      DX-CHEST-PORTABLE (1 VIEW)   Final Result         1.  Pulmonary edema and/or infiltrates.   2.  Trace right pleural effusion   3.  Cardiomegaly           Assessment/Plan  * Septic shock, MSSA bacteremia, L BKA stump abscess (HCC)- (present on admission)  Assessment & Plan  This is Septic shock Present on admission  SIRS criteria identified on my evaluation include: Leukocytosis, with WBC greater than 12,000  Clinical indicators of end organ dysfunction include Lactic Acid greater than 2  Indicators of septic shock include: Sepsis present and initial lactate level result is greater than or equal to 4mmol/L he required intravenous pressors  Sources is: Staph aureus bacteremia  Sepsis protocol initiated  Crystalloid Fluid Administration: Was given  IV antibiotics as appropriate for source of sepsis  Reassessment: I have reassessed the patient's hemodynamic status  Tapering IV hydrocortisone\"    InD of stump abscess/cellulitis today  On perioperative high dose steroids  Echo normal EF, moderate aortic stenosis, moderate AI, no mention of vegetations  Reorder his Lsix for swelling. Scrotal US for scrotal swelling  CT Chest showed bilateral opacities  ID aware.        Class 1 obesity due to excess calories without serious comorbidity with body mass index (BMI) of 32.0 to 32.9 in adult  Assessment & Plan  Recommended weight loss advised, 5% through reduced " "calorie, low carb diet and 150 mins of exercise a week once better  Recommend bariatric surgery evaluation if morbidly obese  Educated on the increase of morbidity and mortality associated with excess weight including DM, Heart Disease, HTN, stroke, and sleep apnea. Recommended outpatient monitoring  of blood sugars, lipid panel, sleep study evaluation for metabolic syndrome.      Abscess of left lower extremity- (present on admission)  Assessment & Plan  Status post debridement on 10/19 and he will require further debridement by ortho.    Bacteremia due to Staphylococcus aureus- (present on admission)  Assessment & Plan  MSSA bacteremia with blood cultures x2 positive  Echocardiogram has been ordered  Infectious disease has been consulted\"    See above    Acquired planovalgus deformity of right foot- (present on admission)  Assessment & Plan  Charcot foot on the right side    Chronic diastolic heart failure with preserved ejection fraction (HCC)- (present on admission)  Assessment & Plan  Monitor for volume overload  He had been on lasix as an outpatient    Chronic pain syndrome- (present on admission)  Assessment & Plan  He is followed by [facility].  He is on morphine immediate release 30 mg 4 times a day and morphine sustained release 15 mg twice daily which will be restarted.  Continue Lyrica and Cymbalta.  Start oxycodone for moderate breakthrough pain and IV Dilaudid for severe breakthrough pain.    Rheumatoid arthritis (HCC)- (present on admission)  Assessment & Plan  He has been on Humira in the past         VTE prophylaxis: Lovenox    I have performed a physical exam and reviewed and updated ROS and Plan today (10/25/2023). In review of yesterday's note (10/24/2023), there are no changes except as documented above.        "

## 2023-10-25 NOTE — DISCHARGE PLANNING
Case Management Discharge Planning    Admission Date: 10/18/2023  GMLOS: 11.1  ALOS: 7    6-Clicks ADL Score: 7  6-Clicks Mobility Score: 6  PT and/or OT Eval ordered: Yes  Post-acute Referrals Ordered: No  Post-acute Choice Obtained: Yes  Has referral(s) been sent to post-acute provider:  Yes      Anticipated Discharge Dispo: Discharge Disposition: D/T to home under A care in anticipation of covered skilled care (06)  Discharge Address: [address] [place] [address]  [place] [address]  Discharge Contact Phone Number: [phone]    DME Needed: No    Action(s) Taken: Updated Provider/Nurse on Discharge Plan    Escalations Completed: OT    Medically Clear: No pending OT eval.     Next Steps: f/u OT eval.    Barriers to Discharge: O.T,    Is the patient up for discharge tomorrow: pending    Patient accepted by  [facility]   O.T. is pending.   Will f/u with discharge needs.

## 2023-10-25 NOTE — THERAPY
Occupational Therapy   Initial Evaluation     Patient Name: Richard Hubbard II  Age:  48 y.o., Sex:  male  Medical Record #: [id number]  Today's Date: 10/25/2023     Precautions  Precautions: Fall Risk, Toe Touch Weight Bearing Right Lower Extremity, Non Weight Bearing Left Lower Extremity  Comments: hx L BKA    Assessment  Patient is 48 y.o. male with a diagnosis of L BKA infections (BKA 7 mo ago) now s/p multiple I&D and revisions. He was originally scheduled to have a cage placed around his R foot for charcot foot w/ Cassinelli but instead had to be admitted for the LLE stump infection.  Additional factors influencing patient status / progress: weakness, fatigue, impaired balance, pain.      Plan    Occupational Therapy Initial Treatment Plan   Treatment Interventions: Self Care / Activities of Daily Living, Adaptive Equipment, Neuro Re-Education / Balance, Therapeutic Exercises, Therapeutic Activity  Treatment Frequency: 3 Times per Week  Duration: Until Therapy Goals Met    DC Equipment Recommendations: Unable to determine at this time  Discharge Recommendations: Recommend post-acute placement for additional occupational therapy services prior to discharge home      Objective       10/25/23 1344   Prior Living Situation   Prior Services Home-Independent   Housing / Facility 1 Story House   Equipment Owned Wheelchair;Slide Board;Lower Extremity Prosthesis (L.E Prosthesis)   Lives with - Patient's Self Care Capacity Spouse;Child Less than 18 Years of Age   Comments PTA pt was independent at home   Prior Level of ADL Function   Self Feeding Independent   Grooming / Hygiene Independent   Bathing Independent   Dressing Independent   Toileting Independent   Prior Level of IADL Function   Medication Management Independent   Laundry Independent   Kitchen Mobility Independent   Finances Independent   Home Management Independent   Shopping Independent   Precautions   Precautions Fall Risk;Toe Touch Weight Bearing  Right Lower Extremity;Non Weight Bearing Left Lower Extremity   Comments hx L BKA   Pain 0 - 10 Group   Therapist Pain Assessment Nurse Notified;During Activity  (pain in BLE)   Cognition    Cognition / Consciousness WDL   Level of Consciousness Alert   Active ROM Upper Body   Active ROM Upper Body  WDL   Strength Upper Body   Upper Body Strength  WDL   Balance Assessment   Sitting Balance (Static) Fair   Sitting Balance (Dynamic) Fair -   Weight Shift Sitting Fair   Comments standing NT   Bed Mobility    Supine to Sit Standby Assist   Sit to Supine Standby Assist   Scooting Standby Assist   ADL Assessment   Grooming Supervision;Seated   Upper Body Dressing Supervision   Lower Body Dressing Moderate Assist   Toileting   (declined need)   Comments edu on importance of skin protection during SB txfs, provided w/ moisture wicking fabric for swollen scrotum, edu on use of it as a sling as well   How much help from another person does the patient currently need...   Putting on and taking off regular lower body clothing? 2   Bathing (including washing, rinsing, and drying)? 2   Toileting, which includes using a toilet, bedpan, or urinal? 2   Putting on and taking off regular upper body clothing? 3   Taking care of personal grooming such as brushing teeth? 3   Eating meals? 3   6 Clicks Daily Activity Score 15   Functional Mobility   Bed, Chair, Wheelchair Transfer Minimal Assist   Transfer Method Slide Board   Mobility EOB>W/c>BTB   Comments difficulty maintaining TTWB RLE on the way back to the bed via SB   Patient / Family Goals   Patient / Family Goal #1 get stronger at rehab   Short Term Goals   Short Term Goal # 1 pt will demo ADL txfs with supv via seated SB   Short Term Goal # 2 pt will dress LB with supv   Short Term Goal # 3 pt will demo toileting w/ supv

## 2023-10-25 NOTE — CARE PLAN
The patient is Watcher - Medium risk of patient condition declining or worsening    Shift Goals  Clinical Goals: pain control, IV ATB  Patient Goals: pain control  Family Goals: No family present    Progress made toward(s) clinical / shift goals:      Problem: Knowledge Deficit - Standard  Goal: Patient and family/care givers will demonstrate understanding of plan of care, disease process/condition, diagnostic tests and medications  Outcome: Progressing       Patient is not progressing towards the following goals:      Problem: Pain - Standard  Goal: Alleviation of pain or a reduction in pain to the patient’s comfort goal  Outcome: Not Progressing   Pt using scheduled pain medication and PRN oxycodone and dilaudid. Reported severe pain at bedtime, dilaudid given.

## 2023-10-25 NOTE — PROGRESS NOTES
"[facility] Medicine Daily Progress Note    Date of Service  10/24/2023    Chief Complaint  Richard Hubbard II is a 48 y.o. male admitted 10/18/2023 with ALOC (Pt found to be altered by wife. Pt reports feeling lethargic. Pt A+Ox4 but not answering further questioning. )    Hospital Course  48 y.o. male who presented 10/18/2023 with fever and infected lower extremity stump.  Mr. Hubbard has a past medical history of rheumatoid arthritis and recent below the knee amputation that was most recently admitted to [facility] then went to inpatient rehab 8/20/2023 through 9/5/2023 after work-up of congestive heart failure with preserved ejection fraction.  10/18/2023 he scented to the emergency room with altered level of consciousness and lethargy.  In the emergency room he was found to have a white blood cell count of 30,000 and a CT lower extremity revealed a enhancing fluid collection at the distal soft tissues at the below the knee amputation consistent with infection and osteomyelitis.  He was admitted in guarded condition to the intensive care unit for IV fluids, broad-spectrum IV antibiotics and management of septic shock.  On 10/19/2023 he underwent left below the knee amputation incision and drainage of an abscess with wound VAC placement.  Blood cultures are positive for MSSA x2.\"    Dr. Andrade 10/20/2023    10/21. I reviewed the chart along with vitals, labs, imaging, test (both pending and resulted) and recommendations from specialists and interdisciplinary team.  HE has L BKA stump infection with wound vac and MSSA bacteremia. Echo is pending. I reviewed ID recommendations. Ordered CT chest  10/22. Planned for another InD for stump infection.abscess. Patient anticipating.   Echo PENDING  CT chest showed bilateral pulmonary opacities. Currently on antibiotics  Await OR cultures and discuss with ID.  Leukocytosis. Ordred labs.  10/23. Mild leukocytosis it has trended down. Discussed with ID and vascular " team. Keep cental line for now. Patient has some crackling on auscultation, L side as well as scrotal swelling. Echo EF %% and moderate aortic valve stenosis. Already had recent CT Chest no get but opacifications for possible pneumonia. On antibiotics, ID aware. Ordered scrotal US.     Interval Problem Update  Patient was evaluated bedside  In no acute distress this morning  Stable vitals  On 1 L liters nasal cannula, titrate  Infect disease following and recommending total of 6 weeks of IV antibiotics which are to end on 12/1/2023  PICC line ordered per ID  PT/OT to evaluate  Labs on AM    I have discussed this patient's plan of care and discharge plan at IDT rounds today with Case Management, Nursing, Nursing leadership, and other members of the IDT team.    Consultants/Specialty  CC admitted  Hospitalist  Orthopedics  ID    Code Status  Full Code    Disposition  The patient is not medically cleared for discharge to home or a post-acute facility.    I have placed the appropriate orders for post-discharge needs.    Review of Systems  Review of Systems   Constitutional:  Positive for fever. Negative for chills.   HENT:  Negative for congestion, hearing loss and nosebleeds.    Eyes:  Negative for pain and redness.   Respiratory:  Negative for cough, hemoptysis, shortness of breath and wheezing.    Cardiovascular:  Negative for chest pain and palpitations.   Gastrointestinal:  Negative for abdominal pain, blood in stool, constipation, diarrhea, nausea and vomiting.   Genitourinary:  Negative for dysuria, frequency and hematuria.   Musculoskeletal:  Negative for falls, joint pain and myalgias.   Skin:  Positive for rash.   Neurological:  Negative for dizziness, tremors, focal weakness, seizures, loss of consciousness, weakness and headaches.   Psychiatric/Behavioral:  The patient is not nervous/anxious and does not have insomnia.    All other systems reviewed and are negative.       Physical Exam  Temp:  [36.6 °C  (97.9 °F)-36.8 °C (98.2 °F)] 36.6 °C (97.9 °F)  Pulse:  [79-86] 82  Resp:  [15-16] 16  BP: ()/(48-63) 112/63  SpO2:  [90 %-98 %] 90 %    Physical Exam  Vitals and nursing note reviewed.   Constitutional:       Appearance: He is obese.   HENT:      Head: Normocephalic and atraumatic.      Right Ear: External ear normal.      Left Ear: External ear normal.      Nose: Nose normal.      Mouth/Throat:      Mouth: Mucous membranes are moist.   Eyes:      General: No scleral icterus.     Conjunctiva/sclera: Conjunctivae normal.   Cardiovascular:      Rate and Rhythm: Normal rate and regular rhythm.      Heart sounds: Murmur heard.      No friction rub. No gallop.   Pulmonary:      Effort: Pulmonary effort is normal.      Breath sounds: Normal breath sounds.   Abdominal:      General: Abdomen is flat. Bowel sounds are normal. There is no distension.      Palpations: Abdomen is soft.      Tenderness: There is no abdominal tenderness. There is no guarding.      Comments: Ventral hernia   Genitourinary:     Comments: Scrotal swelling  Musculoskeletal:         General: Deformity (L BKA) present. Normal range of motion.      Cervical back: Normal range of motion and neck supple.      Comments: R Charcot foot   Skin:     General: Skin is warm.   Neurological:      Mental Status: He is alert and oriented to person, place, and time. Mental status is at baseline.   Psychiatric:         Mood and Affect: Mood normal.         Behavior: Behavior normal.         Thought Content: Thought content normal.         Judgment: Judgment normal.      Comments: Calm         Fluids    Intake/Output Summary (Last 24 hours) at 10/24/2023 1839  Last data filed at 10/24/2023 1519  Gross per 24 hour   Intake 720 ml   Output 4200 ml   Net -3480 ml       Laboratory  Recent Labs     10/23/23  0545 10/24/23  0423   WBC 11.3* 9.6   RBC 3.41* 3.29*   HEMOGLOBIN 9.9* 9.1*   HEMATOCRIT 31.3* 30.3*   MCV 91.8 92.1   MCH 29.0 27.7   MCHC 31.6* 30.0*   RDW  57.1* 57.5*   PLATELETCT 165 141*   MPV 9.8 9.2     Recent Labs     10/23/23  0545 10/24/23  0423   SODIUM 137 139   POTASSIUM 4.4 4.4   CHLORIDE 101 103   CO2 31 33   GLUCOSE 93 83   BUN 13 11   CREATININE 0.50 0.49*   CALCIUM 7.6* 7.4*                     Imaging  IR-PICC LINE PLACEMENT W/ GUIDANCE > AGE 5   Final Result                  Ultrasound-guided PICC placement performed by qualified nursing staff as    above.          [id number]   Final Result      1.  Diffuse induration/soft tissue swelling of the scrotal wall which may represent cellulitis. No soft tissue abscess.   2.  Moderate right hydrocele.      CT-CHEST (THORAX) W/O   Final Result      1.  Moderate size right pleural effusion is identified.      2.  Probable consolidative atelectasis in the right lower pulmonary lobe.      3.  Bilateral pulmonary opacifications are identified as described above. Findings could be due to inflammation or infection. Pneumonitis and pneumonia are possibilities.      Fleischner Society pulmonary nodule recommendations:   Not Applicable         EC-ECHOCARDIOGRAM COMPLETE W/ CONT   Final Result      DX-CHEST-PORTABLE (1 VIEW)   Final Result         1.  Pulmonary edema and/or infiltrates are identified, which appear somewhat increased since the prior exam.   2.  Cardiomegaly      CT-EXTREMITY, LOWER WITH LEFT   Final Result         1.  Enhancing fluid collection at the distal soft tissues at below the knee amputation, appearance most compatible with abscess. Appears to communicate with the skin surface.   2.  Suspected abscess abuts the distal tibial stump concerning for changes of osteomyelitis.   3.  Diffuse subcutaneous fat stranding, compatible changes of edema and/or cellulitis.      CT-ABDOMEN-PELVIS WITH   Final Result         1.  4.6 cm mid abdominal hernia containing a portion of transverse without visualized obstructive changes   2.  Bilateral pulmonary infiltrates.   3.  Moderate right and trace left  "pleural effusion   4.  Diffuse subcutaneous fat stranding, appearance favoring changes of anasarca   5.  Atherosclerosis and atherosclerotic coronary artery disease      DX-TIBIA AND FIBULA LEFT   Final Result         1.  No acute traumatic bony injury.   2.  Soft tissue gas at the distal amputation site anteriorly, appearance suggests open wound and/or underlying infectious etiology. Correlate with exam.      DX-CHEST-PORTABLE (1 VIEW)   Final Result         1.  Pulmonary edema and/or infiltrates.   2.  Trace right pleural effusion   3.  Cardiomegaly           Assessment/Plan  * Septic shock, MSSA bacteremia, L BKA stump abscess (HCC)- (present on admission)  Assessment & Plan  This is Septic shock Present on admission  SIRS criteria identified on my evaluation include: Leukocytosis, with WBC greater than 12,000  Clinical indicators of end organ dysfunction include Lactic Acid greater than 2  Indicators of septic shock include: Sepsis present and initial lactate level result is greater than or equal to 4mmol/L he required intravenous pressors  Sources is: Staph aureus bacteremia  Sepsis protocol initiated  Crystalloid Fluid Administration: Was given  IV antibiotics as appropriate for source of sepsis  Reassessment: I have reassessed the patient's hemodynamic status  Tapering IV hydrocortisone\"    InD of stump abscess/cellulitis today  On perioperative high dose steroids  Echo normal EF, moderate aortic stenosis, moderate AI, no mention of vegetations  Reorder his Lsix for swelling. Scrotal US for scrotal swelling  CT Chest showed bilateral opacities  ID aware.        Class 1 obesity due to excess calories without serious comorbidity with body mass index (BMI) of 32.0 to 32.9 in adult  Assessment & Plan  Recommended weight loss advised, 5% through reduced calorie, low carb diet and 150 mins of exercise a week once better  Recommend bariatric surgery evaluation if morbidly obese  Educated on the increase of morbidity " "and mortality associated with excess weight including DM, Heart Disease, HTN, stroke, and sleep apnea. Recommended outpatient monitoring  of blood sugars, lipid panel, sleep study evaluation for metabolic syndrome.      Abscess of left lower extremity- (present on admission)  Assessment & Plan  Status post debridement on 10/19 and he will require further debridement by ortho.    Bacteremia due to Staphylococcus aureus- (present on admission)  Assessment & Plan  MSSA bacteremia with blood cultures x2 positive  Echocardiogram has been ordered  Infectious disease has been consulted\"    See above    Acquired planovalgus deformity of right foot- (present on admission)  Assessment & Plan  Charcot foot on the right side    Chronic diastolic heart failure with preserved ejection fraction (HCC)- (present on admission)  Assessment & Plan  Monitor for volume overload  He had been on lasix as an outpatient    Chronic pain syndrome- (present on admission)  Assessment & Plan  He is followed by [facility].  He is on morphine immediate release 30 mg 4 times a day and morphine sustained release 15 mg twice daily which will be restarted.  Continue Lyrica and Cymbalta.  Start oxycodone for moderate breakthrough pain and IV Dilaudid for severe breakthrough pain.    Rheumatoid arthritis (HCC)- (present on admission)  Assessment & Plan  He has been on Humira in the past         VTE prophylaxis: Lovenox    I have performed a physical exam and reviewed and updated ROS and Plan today (10/24/2023). In review of yesterday's note (10/23/2023), there are no changes except as documented above.        "

## 2023-10-25 NOTE — THERAPY
Physical Therapy   Initial Evaluation     Patient Name: Richard Hubbard II  Age:  48 y.o., Sex:  male  Medical Record #: [id number]  Today's Date: 10/25/2023     Precautions  Precautions: Fall Risk;Non Weight Bearing Left Lower Extremity;Non Weight Bearing Right Lower Extremity  Comments: Hx of L BKA    Assessment  Patient is 48 y.o. male with ALOC, Pt found to be altered by wife.  Septic shock, MSSA bacteremia, L BKA stump abscess.  Bacteremia due to Staphylococcus aureus  S/P  Left BKA revision primary closure.   PMhx of Charcot foot R, CHF, chronic pain syndrome, RA.    Pt able to sit up to EOB and wanted to try slide board to/from WC. Pt with difficult time maintaining weight bearing status R LE with slide board transfer and pt required seated rest while on slide board due to fatigue and weakness. Pt back in bed at end of treatment. Patient with decreased activity tolerance, high fall risk, decreased sitting balance, and pain and will continue to benefit from Acute Care Physical Therapy to assist towards established goals. Anticipate pt will benefit from post acute placement upon DC from hospital.           Plan    Physical Therapy Initial Treatment Plan   Treatment Plan : Bed Mobility, Therapeutic Activities, Therapeutic Exercise, Manual Therapy, Neuro Re-Education / Balance  Treatment Frequency: 3 Times per Week  Duration: Until Therapy Goals Met    DC Equipment Recommendations: Unable to determine at this time  Discharge Recommendations: Recommend post-acute placement for additional physical therapy services prior to discharge home       Subjective    Pt resting in bed, agreeable to in the PT/OT.     Objective       10/25/23 3006   Initial Contact Note    Initial Contact Note Order Received and Verified, Physical Therapy Evaluation in Progress with Full Report to Follow.   Precautions   Precautions Fall Risk;Non Weight Bearing Left Lower Extremity;Non Weight Bearing Right Lower Extremity   Comments Hx of L  BKA   Pain 0 - 10 Group   Location Leg;Scrotum  (scrotal discomfort due to edema and irritated)   Location Orientation Left   Therapist Pain Assessment Nurse Notified;During Activity   Prior Living Situation   Prior Services Home-Independent   Housing / Facility 1 Story House   Equipment Owned Wheelchair;Slide Board;Lower Extremity Prosthesis (L.E Prosthesis);Front-Wheel Walker;Sock Aid;Reacher;Tub / Shower Seat   Lives with - Patient's Self Care Capacity Spouse;Child Less than 18 Years of Age   Comments Pt stated that he was supposed to not be using his L prosthesis in order for residual wound to heal but he had to take care of his mother and was using his prosthesis.   Cognition    Cognition / Consciousness WDL   Level of Consciousness Alert   Active ROM Lower Body    Active ROM Lower Body  WDL   Comments L BKA WDL, pt with charcot foot R   Strength Lower Body   Lower Body Strength  WDL   Balance Assessment   Sitting Balance (Static) Fair   Sitting Balance (Dynamic) Fair -   Weight Shift Sitting Fair   Bed Mobility    Supine to Sit Standby Assist   Sit to Supine Standby Assist   Scooting Standby Assist   Functional Mobility   Bed, Chair, Wheelchair Transfer Minimal Assist   Transfer Method Slide Board   Mobility EOB<>WC, pt u/a to maintain NWB R LE with slide board trasnfers due to fatigue and UE weakness   Wheelchair Assist   (assistance with set up and slide board set up)   How much difficulty does the patient currently have...   Turning over in bed (including adjusting bedclothes, sheets and blankets)? 3   Sitting down on and standing up from a chair with arms (e.g., wheelchair, bedside commode, etc.) 1   Moving from lying on back to sitting on the side of the bed? 3   How much help from another person does the patient currently need...   Moving to and from a bed to a chair (including a wheelchair)? 2   Need to walk in a hospital room? 1   Climbing 3-5 steps with a railing? 1   6 clicks Mobility Score 11  "  Activity Tolerance   Sitting in Chair >15 minutes   Comments pt with fatigue during slide board transfers and required rest during transfers for about 30 seconds   Edema / Skin Assessment   Edema / Skin  X   Comments pt with irritated skin on back and elbows, RN got pt hypoallergenic sheets, put the sheets on the bed while out of bed   Patient / Family Goals    Patient / Family Goal #1 \"I think I need to go to rehab.\"   Short Term Goals    Short Term Goal # 1 supine<>sit supervised in 6 visits to progress bed mobility   Short Term Goal # 2 slide board transfers SBA maintaining TDWB R LE and no rest during transfer to progress transfers   Short Term Goal # 3 WC propulsion 150ft supervised for household mobility   Education Group   Education Provided Role of Physical Therapist;Weight Bearing Precautions;Transfer Status   Role of Physical Therapist Patient Response Patient;Acceptance;Explanation;Verbal Demonstration   Transfer Status Patient Response Patient;Acceptance;Explanation;Action Demonstration   Weight Bearing Precautions Patient Response Patient;Acceptance;Explanation;Verbal Demonstration;Reinforcement Needed  (pt with difficult time maintaining NWB R LE due to UE weakness and fatigue. Pt able to maintain NWB L LE.)   Physical Therapy Initial Treatment Plan    Treatment Plan  Bed Mobility;Therapeutic Activities;Therapeutic Exercise;Manual Therapy;Neuro Re-Education / Balance   Treatment Frequency 3 Times per Week   Duration Until Therapy Goals Met   Problem List    Problems Pain;Impaired Bed Mobility;Impaired Transfers;Functional Strength Deficit;Impaired Balance;Decreased Activity Tolerance   Anticipated Discharge Equipment and Recommendations   DC Equipment Recommendations Unable to determine at this time   Discharge Recommendations Recommend post-acute placement for additional physical therapy services prior to discharge home   Interdisciplinary Plan of Care Collaboration   IDT Collaboration with  " Nursing;Occupational Therapist  Patient benefited from co-treatment with Occupational Therapist for the following reason(s):  Patient required 2 person assistance for safety and to provide effective interventions. Each discipline assisted patient with appropriate and separate goals.     Patient Position at End of Therapy In Bed;Call Light within Reach;Tray Table within Reach;Phone within Reach

## 2023-10-25 NOTE — DISCHARGE PLANNING
"[facility] TCN chart review completed. Collaborated with LINSEY Almaraz.  Current discharge considerations are for IRF vs. LTACH.  Per chart review, patient currently on IV ABX and has wound vac.  Per Infectious Disease note by Aury Silva MD on 10/24/23 at 10:52 AM, \" Recommend a 6-week IV antibiotic course for bacteremia and osteomyelitis, end 12/1/23 \".   Patient seen at bedside and is agreeable to LTACH  placement.  He states he has a shower chair, commode, W/C, FWW, LH reacher,  shoe horn, sock aid and L LE prosthesis.  TCN will continue to follow and collaborate with discharge planning team as additional post acute needs arise. Thank you.    Completed:  PT/OT consults not in place as of time writing this note; TCN/CM to follow up with MD and request PT/OT orders Tuesday   Choice obtained: IRF ([facility] acute rehab ); ltach ([facility])    Addendum:  3540- [facility] authorization/referral for LTAC obtained.  Notified Meg roa from LTAC.  TCN will notify CM in AM.  "

## 2023-10-25 NOTE — PROGRESS NOTES
"      Orthopaedic Progress Note    Interval changes:  Patient doing well    Cleared for DC to SNF by ortho pending medicine clearance    ROS - Patient denies any new issues.  Pain well controlled.    /67   Pulse 89   Temp 36.7 °C (98.1 °F) (Temporal)   Resp 16   Ht 1.778 m (5' 10\")   Wt 104 kg (229 lb 15 oz)   SpO2 91%     Patient seen and examined  No acute distress  Breathing non labored  RRR  LLE dressing CDI, no contracture noted.     Recent Labs     10/23/23  0545 10/24/23  0423 10/25/23  0500   WBC 11.3* 9.6 11.5*   RBC 3.41* 3.29* 3.54*   HEMOGLOBIN 9.9* 9.1* 10.1*   HEMATOCRIT 31.3* 30.3* 32.1*   MCV 91.8 92.1 90.7   MCH 29.0 27.7 28.5   MCHC 31.6* 30.0* 31.5*   RDW 57.1* 57.5* 58.0*   PLATELETCT 165 141* 142*   MPV 9.8 9.2 10.2       Active Hospital Problems    Diagnosis     Rheumatoid arthritis ([facility]) [M06.9]      Priority: Low     On Humira      Class 1 obesity due to excess calories without serious comorbidity with body mass index (BMI) of 32.0 to 32.9 in adult [E66.09, Z68.32]     Bacteremia due to Staphylococcus aureus [R78.81, B95.61]     Abscess of left lower extremity [L02.416]     Acquired planovalgus deformity of right foot [M21.6X1]     Septic shock, MSSA bacteremia, L BKA stump abscess ([facility]) [A41.9, R65.21]     Chronic diastolic heart failure with preserved ejection fraction ([facility]) [I50.32]     Chronic pain syndrome [G89.4]     Chronic, continuous use of opioids [F11.90]     MONICA (obstructive sleep apnea) [G47.33]      Diagnosed around ~2010, previously intolerant of CPAP. Saw pulmonology 1/2021, did not follow through with sleep study for CPAP/BIPAP trial      Mood disorder (HCC) [F39]      Mood disorder unspecified   On duloxetine 60mg for chronic pain  Report hx of diagnose with bipolar.       Insomnia [G47.00]        Assessment/Plan:  Patient doing well    LLE dressings are CDI  Cleared for DC to SNF by ortho pending medicine clearance  POD#3 S/P  Left BKA revision primary " closure  Wt bearing status - NWB BLE  Wound care/Drains - Dressings to be changed every other day by nursing. Or PRN for saturation starting POD#2  Future Procedures - none planned   Lovenox: Start 10/22, Duration-until ambulatory > 150'  Sutures/Staples out- 14-21 days post operatively. Removal will completed by ortho mid levels only.  PT/OT-initiated  Antibiotics: ancef 2g IV Q8  DVT Prophylaxis- TEDS/SCDs/Foot pumps  [place]-not needed per ortho  Case Coordination for Discharge Planning - Disposition per therapy recs.

## 2023-10-25 NOTE — DISCHARGE PLANNING
Care Transition Team Assessment    Information Source  Orientation Level: Oriented X4  Information Given By: Patient  Informant's Name: Richard Hubbard II  Who is responsible for making decisions for patient? : Patient    Readmission Evaluation  Is this a readmission?: No    Elopement Risk  Legal Hold: No  Ambulatory or Self Mobile in Wheelchair: No-Not an Elopement Risk  Disoriented: No  Psychiatric Symptoms: None  History of Wandering: No  Elopement this Admit: No  Vocalizing Wanting to Leave: No  Displays Behaviors, Body Language Wanting to Leave: No-Not at Risk for Elopement  Elopement Risk: Not at Risk for Elopement    Interdisciplinary Discharge Planning  Does Admitting Nurse Feel This Could be a Complex Discharge?: No  Primary Care Physician: VIDHYA STROUD M.D.  Lives with - Patient's Self Care Capacity: Spouse, Child Less than 18 Years of Age  Patient or legal guardian wants to designate a caregiver: No  Support Systems: Family Member(s)  Housing / Facility: [address] ([address])  Do You Take your Prescribed Medications Regularly: Yes ([facility] in [place])  Able to Return to Previous ADL's: Yes  Mobility Issues: Yes  Prior Services: Skilled Home Health Services (Patient stated that he was on service with [facility].)  Patient Prefers to be Discharged to:: home  Durable Medical Equipment: Walker, Commode, Other - Specify (wheelchair and BSC)    Discharge Preparedness  What is your plan after discharge?: Home with help, Home health care  What are your discharge supports?: Spouse  Prior Functional Level: Ambulatory, Independent with Activities of Daily Living  Difficulity with ADLs: Walking    Functional Assesment  Prior Functional Level: Ambulatory, Independent with Activities of Daily Living    Finances  Financial Barriers to Discharge: No  Prescription Coverage: Yes (Uses [facility] in [place])    Vision / Hearing Impairment  Vision Impairment : Yes  Right Eye Vision: Impaired, Wears  Glasses  Left Eye Vision: Impaired, Wears Glasses  Hearing Impairment : No    Advance Directive  Advance Directive?: None (Per the patient his wife will serve as his Advanced Directive.)  Advance Directive offered?: AD Booklet refused    Domestic Abuse  Have you ever been the victim of abuse or violence?: No  Physical Abuse or Sexual Abuse: No  Verbal Abuse or Emotional Abuse: No  Possible Abuse/Neglect Reported to:: Not Applicable      Discharge Risks or Barriers  Discharge risks or barriers?: No    Anticipated Discharge Information  Discharge Disposition: D/T to home under A care in anticipation of covered skilled care (06)  Discharge Address: [address] [place] [address]  [place] [address]  Discharge Contact Phone Number: [phone]    CM met with the patient at the bedside to discuss disccharge planning.   Patient is not medically cleared.  Per the patient is not willing to go to a SNF but is open to go to [facility] or to [facility]

## 2023-10-25 NOTE — CARE PLAN
The patient is Stable - Low risk of patient condition declining or worsening    Shift Goals  Clinical Goals: Pain Mgt/ PT/OT  Patient Goals: Pain Mgt  Family Goals: No family present    Progress made toward(s) clinical / shift goals:       Problem: Pain - Standard  Goal: Alleviation of pain or a reduction in pain to the patient’s comfort goal  Outcome: Progressing     Problem: Knowledge Deficit - Standard  Goal: Patient and family/care givers will demonstrate understanding of plan of care, disease process/condition, diagnostic tests and medications  Outcome: Progressing     Problem: Hemodynamics  Goal: Patient's hemodynamics, fluid balance and neurologic status will be stable or improve  Outcome: Progressing

## 2023-10-26 NOTE — PROGRESS NOTES
"      Orthopaedic Progress Note    Interval changes:  Patient doing well    Cleared for DC to SNF by ortho pending medicine clearance    ROS - Patient denies any new issues.  Pain well controlled.    /57   Pulse 73   Temp 36.3 °C (97.3 °F) (Temporal)   Resp 15   Ht 1.778 m (5' 10\")   Wt 104 kg (229 lb 15 oz)   SpO2 94%     Patient seen and examined  No acute distress  Breathing non labored  RRR  LLE dressing CDI, no contracture noted.     Recent Labs     10/24/23  0423 10/25/23  0500 10/26/23  0430   WBC 9.6 11.5* 8.3   RBC 3.29* 3.54* 3.46*   HEMOGLOBIN 9.1* 10.1* 9.8*   HEMATOCRIT 30.3* 32.1* 31.4*   MCV 92.1 90.7 90.8   MCH 27.7 28.5 28.3   MCHC 30.0* 31.5* 31.2*   RDW 57.5* 58.0* 58.4*   PLATELETCT 141* 142* 148*   MPV 9.2 10.2 9.8       Active Hospital Problems    Diagnosis     Rheumatoid arthritis ([facility]) [M06.9]      Priority: Low     On Humira      Class 1 obesity due to excess calories without serious comorbidity with body mass index (BMI) of 32.0 to 32.9 in adult [E66.09, Z68.32]     Bacteremia due to Staphylococcus aureus [R78.81, B95.61]     Abscess of left lower extremity [L02.416]     Acquired planovalgus deformity of right foot [M21.6X1]     Septic shock, MSSA bacteremia, L BKA stump abscess ([facility]) [A41.9, R65.21]     Chronic diastolic heart failure with preserved ejection fraction ([facility]) [I50.32]     Chronic pain syndrome [G89.4]     Chronic, continuous use of opioids [F11.90]     MONICA (obstructive sleep apnea) [G47.33]      Diagnosed around ~2010, previously intolerant of CPAP. Saw pulmonology 1/2021, did not follow through with sleep study for CPAP/BIPAP trial      Mood disorder (HCC) [F39]      Mood disorder unspecified   On duloxetine 60mg for chronic pain  Report hx of diagnose with bipolar.       Insomnia [G47.00]        Assessment/Plan:  Patient doing well    LLE dressings are CDI  Cleared for DC to SNF by ortho pending medicine clearance  POD#4 S/P Left BKA revision primary closure  Wt " bearing status - NWB BLE  Wound care/Drains - Dressings to be changed every other day by nursing. Or PRN for saturation starting POD#2  Future Procedures - none planned   Lovenox: Start 10/22, Duration-until ambulatory > 150'  Sutures/Staples out- 14-21 days post operatively. Removal will completed by ortho mid levels only.  PT/OT-initiated  Antibiotics: ancef 2g IV Q8  DVT Prophylaxis- TEDS/SCDs/Foot pumps  Rivera-not needed per ortho  Case Coordination for Discharge Planning - Disposition per therapy recs.

## 2023-10-26 NOTE — PROGRESS NOTES
"[facility] Medicine Daily Progress Note    Date of Service  10/26/2023    Chief Complaint  Richard Hubbard II is a 48 y.o. male admitted 10/18/2023 with ALOC (Pt found to be altered by wife. Pt reports feeling lethargic. Pt A+Ox4 but not answering further questioning. )    Hospital Course  48 y.o. male who presented 10/18/2023 with fever and infected lower extremity stump.  Mr. Hubbard has a past medical history of rheumatoid arthritis and recent below the knee amputation that was most recently admitted to [facility] then went to inpatient rehab 8/20/2023 through 9/5/2023 after work-up of congestive heart failure with preserved ejection fraction.  10/18/2023 he scented to the emergency room with altered level of consciousness and lethargy.  In the emergency room he was found to have a white blood cell count of 30,000 and a CT lower extremity revealed a enhancing fluid collection at the distal soft tissues at the below the knee amputation consistent with infection and osteomyelitis.  He was admitted in guarded condition to the intensive care unit for IV fluids, broad-spectrum IV antibiotics and management of septic shock.  On 10/19/2023 he underwent left below the knee amputation incision and drainage of an abscess with wound VAC placement.  Blood cultures are positive for MSSA x2.\"    Dr. Andrade 10/20/2023    10/21. I reviewed the chart along with vitals, labs, imaging, test (both pending and resulted) and recommendations from specialists and interdisciplinary team.  HE has L BKA stump infection with wound vac and MSSA bacteremia. Echo is pending. I reviewed ID recommendations. Ordered CT chest  10/22. Planned for another InD for stump infection.abscess. Patient anticipating.   Echo PENDING  CT chest showed bilateral pulmonary opacities. Currently on antibiotics  Await OR cultures and discuss with ID.  Leukocytosis. Ordred labs.  10/23. Mild leukocytosis it has trended down. Discussed with ID and vascular " team. Keep cental line for now. Patient has some crackling on auscultation, L side as well as scrotal swelling. Echo EF %% and moderate aortic valve stenosis. Already had recent CT Chest no get but opacifications for possible pneumonia. On antibiotics, ID aware. Ordered scrotal US.     Interval Problem Update  Patient was evaluated bedside  In no acute distress this morning  Infect disease following and recommending total of 6 weeks of IV antibiotics which are to end on 12/1/2023  PT/OT recommending postacute care  PMR to evaluate    I have discussed this patient's plan of care and discharge plan at IDT rounds today with Case Management, Nursing, Nursing leadership, and other members of the IDT team.    Consultants/Specialty  CC admitted  Hospitalist  Orthopedics  ID    Code Status  Full Code    Disposition  The patient is medically cleared for discharge to home or a post-acute facility.    Pending PT/OT evaluation    I have placed the appropriate orders for post-discharge needs.    Review of Systems  Review of Systems   Constitutional:  Positive for fever. Negative for chills.   HENT:  Negative for congestion, hearing loss and nosebleeds.    Eyes:  Negative for pain and redness.   Respiratory:  Negative for cough, hemoptysis, shortness of breath and wheezing.    Cardiovascular:  Negative for chest pain and palpitations.   Gastrointestinal:  Negative for abdominal pain, blood in stool, constipation, diarrhea, nausea and vomiting.   Genitourinary:  Negative for dysuria, frequency and hematuria.   Musculoskeletal:  Negative for falls, joint pain and myalgias.   Skin:  Positive for rash.   Neurological:  Negative for dizziness, tremors, focal weakness, seizures, loss of consciousness, weakness and headaches.   Psychiatric/Behavioral:  The patient is not nervous/anxious and does not have insomnia.    All other systems reviewed and are negative.       Physical Exam  Temp:  [36.3 °C (97.3 °F)-36.8 °C (98.2 °F)] 36.3 °C  (97.3 °F)  Pulse:  [71-85] 73  Resp:  [15-16] 15  BP: ()/(45-57) 109/57  SpO2:  [93 %-96 %] 94 %    Physical Exam  Vitals and nursing note reviewed.   Constitutional:       Appearance: He is obese.   HENT:      Head: Normocephalic and atraumatic.      Right Ear: External ear normal.      Left Ear: External ear normal.      Nose: Nose normal.      Mouth/Throat:      Mouth: Mucous membranes are moist.   Eyes:      General: No scleral icterus.     Conjunctiva/sclera: Conjunctivae normal.   Cardiovascular:      Rate and Rhythm: Normal rate and regular rhythm.      Heart sounds: Murmur heard.      No friction rub. No gallop.   Pulmonary:      Effort: Pulmonary effort is normal.      Breath sounds: Normal breath sounds.   Abdominal:      General: Abdomen is flat. Bowel sounds are normal. There is no distension.      Palpations: Abdomen is soft.      Tenderness: There is no abdominal tenderness. There is no guarding.      Comments: Ventral hernia   Genitourinary:     Comments: Scrotal swelling  Musculoskeletal:         General: Deformity (L BKA) present. Normal range of motion.      Cervical back: Normal range of motion and neck supple.      Comments: R Charcot foot   Skin:     General: Skin is warm.   Neurological:      Mental Status: He is alert and oriented to person, place, and time. Mental status is at baseline.   Psychiatric:         Mood and Affect: Mood normal.         Behavior: Behavior normal.         Thought Content: Thought content normal.         Judgment: Judgment normal.      Comments: Calm         Fluids    Intake/Output Summary (Last 24 hours) at 10/26/2023 0848  Last data filed at 10/26/2023 0420  Gross per 24 hour   Intake --   Output 4000 ml   Net -4000 ml       Laboratory  Recent Labs     10/24/23  0423 10/25/23  0500 10/26/23  0430   WBC 9.6 11.5* 8.3   RBC 3.29* 3.54* 3.46*   HEMOGLOBIN 9.1* 10.1* 9.8*   HEMATOCRIT 30.3* 32.1* 31.4*   MCV 92.1 90.7 90.8   MCH 27.7 28.5 28.3   MCHC 30.0* 31.5*  31.2*   RDW 57.5* 58.0* 58.4*   PLATELETCT 141* 142* 148*   MPV 9.2 10.2 9.8     Recent Labs     10/24/23  0423 10/25/23  0500 10/26/23  0430   SODIUM 139 137 136   POTASSIUM 4.4 4.5 4.4   CHLORIDE 103 100 100   CO2 33 31 33   GLUCOSE 83 74 88   BUN 11 10 9   CREATININE 0.49* 0.54 0.46*   CALCIUM 7.4* 7.7* 8.0*                     Imaging  IR-PICC LINE PLACEMENT W/ GUIDANCE > AGE 5   Final Result                  Ultrasound-guided PICC placement performed by qualified nursing staff as    above.          [id number]   Final Result      1.  Diffuse induration/soft tissue swelling of the scrotal wall which may represent cellulitis. No soft tissue abscess.   2.  Moderate right hydrocele.      CT-CHEST (THORAX) W/O   Final Result      1.  Moderate size right pleural effusion is identified.      2.  Probable consolidative atelectasis in the right lower pulmonary lobe.      3.  Bilateral pulmonary opacifications are identified as described above. Findings could be due to inflammation or infection. Pneumonitis and pneumonia are possibilities.      Fleischner Society pulmonary nodule recommendations:   Not Applicable         EC-ECHOCARDIOGRAM COMPLETE W/ CONT   Final Result      DX-CHEST-PORTABLE (1 VIEW)   Final Result         1.  Pulmonary edema and/or infiltrates are identified, which appear somewhat increased since the prior exam.   2.  Cardiomegaly      CT-EXTREMITY, LOWER WITH LEFT   Final Result         1.  Enhancing fluid collection at the distal soft tissues at below the knee amputation, appearance most compatible with abscess. Appears to communicate with the skin surface.   2.  Suspected abscess abuts the distal tibial stump concerning for changes of osteomyelitis.   3.  Diffuse subcutaneous fat stranding, compatible changes of edema and/or cellulitis.      CT-ABDOMEN-PELVIS WITH   Final Result         1.  4.6 cm mid abdominal hernia containing a portion of transverse without visualized obstructive changes  "  2.  Bilateral pulmonary infiltrates.   3.  Moderate right and trace left pleural effusion   4.  Diffuse subcutaneous fat stranding, appearance favoring changes of anasarca   5.  Atherosclerosis and atherosclerotic coronary artery disease      DX-TIBIA AND FIBULA LEFT   Final Result         1.  No acute traumatic bony injury.   2.  Soft tissue gas at the distal amputation site anteriorly, appearance suggests open wound and/or underlying infectious etiology. Correlate with exam.      DX-CHEST-PORTABLE (1 VIEW)   Final Result         1.  Pulmonary edema and/or infiltrates.   2.  Trace right pleural effusion   3.  Cardiomegaly           Assessment/Plan  * Septic shock, MSSA bacteremia, L BKA stump abscess (HCC)- (present on admission)  Assessment & Plan  This is Septic shock Present on admission  SIRS criteria identified on my evaluation include: Leukocytosis, with WBC greater than 12,000  Clinical indicators of end organ dysfunction include Lactic Acid greater than 2  Indicators of septic shock include: Sepsis present and initial lactate level result is greater than or equal to 4mmol/L he required intravenous pressors  Sources is: Staph aureus bacteremia  Sepsis protocol initiated  Crystalloid Fluid Administration: Was given  IV antibiotics as appropriate for source of sepsis  Reassessment: I have reassessed the patient's hemodynamic status  Tapering IV hydrocortisone\"    InD of stump abscess/cellulitis today  On perioperative high dose steroids  Echo normal EF, moderate aortic stenosis, moderate AI, no mention of vegetations  Reorder his Lsix for swelling. Scrotal US for scrotal swelling  CT Chest showed bilateral opacities  ID aware.        Class 1 obesity due to excess calories without serious comorbidity with body mass index (BMI) of 32.0 to 32.9 in adult  Assessment & Plan  Recommended weight loss advised, 5% through reduced calorie, low carb diet and 150 mins of exercise a week once better  Recommend bariatric " "surgery evaluation if morbidly obese  Educated on the increase of morbidity and mortality associated with excess weight including DM, Heart Disease, HTN, stroke, and sleep apnea. Recommended outpatient monitoring  of blood sugars, lipid panel, sleep study evaluation for metabolic syndrome.      Abscess of left lower extremity- (present on admission)  Assessment & Plan  Status post debridement on 10/19 and he will require further debridement by ortho.    Bacteremia due to Staphylococcus aureus- (present on admission)  Assessment & Plan  MSSA bacteremia with blood cultures x2 positive  Echocardiogram has been ordered  Infectious disease has been consulted\"    See above    Acquired planovalgus deformity of right foot- (present on admission)  Assessment & Plan  Charcot foot on the right side    Chronic diastolic heart failure with preserved ejection fraction (HCC)- (present on admission)  Assessment & Plan  Monitor for volume overload  He had been on lasix as an outpatient    Chronic pain syndrome- (present on admission)  Assessment & Plan  He is followed by [facility].  He is on morphine immediate release 30 mg 4 times a day and morphine sustained release 15 mg twice daily which will be restarted.  Continue Lyrica and Cymbalta.  Start oxycodone for moderate breakthrough pain and IV Dilaudid for severe breakthrough pain.    Rheumatoid arthritis (HCC)- (present on admission)  Assessment & Plan  He has been on Humira in the past         VTE prophylaxis: Lovenox    I have performed a physical exam and reviewed and updated ROS and Plan today (10/26/2023). In review of yesterday's note (10/25/2023), there are no changes except as documented above.        "

## 2023-10-26 NOTE — DISCHARGE PLANNING
"[facility] TCN chart review completed. Collaborated with LINSEY Berg. Current discharge considerations are IRF. Note patient accepted by [facility] and several SNFs. Acceptance to [facility] is still pending (see below). Per chart review, patient on IV ABX (6 weeks) and wound vac.    PT/OT evals completed 10/25 with recommendation for post acute placement.    Physiatry consult completed today with statement \"Patient is a potential candidate for inpatient rehab based on needs for PT, OT, however pending confirmation of access to home IV abx will determine eligibility\" and \"prior to acceptance to IRF, will need insurance auth\".    TCN let [facility] acute re[facility] TCC that patient's insurance [facility] covers home IV ABX through [facility]. TCN met with patient at bedside and discussed home IV ABX needs once discharged from [facility] acute rehab. Patient is amenable to pay home infusion co-pays (although exact co-pay amount will only be known once [facility] received MD order and referral). CM notified. Per TCC, [facility] acute rehab will have available beds tomorrow.     TCN will continue to follow and collaborate with discharge planning team as additional post acute needs arise. Thank you.    Completed:   PT/OT with recs for post acute placement  Physiatry consult with statement pt is a potential candidate  Choice obtained: IRF ([facility] ); [facility] ([facility]); SNF ([facility])  "

## 2023-10-26 NOTE — PROGRESS NOTES
Report received. Assumed care. Pt in bed awake. A/O x4. VSS. Responds appropriately. C/O pain, medicated per MAR, no SOB. Assessment complete. L BKA noted with surgical dressing, cdi. CMS intact. Rivera in place with moderate yellow urine output, intact. Discussed POC, , pt verbalizes understanding. Explained importance of calling before getting OOB. Call light and belongings within reach. Bed alarm on. Bed in the lowest position. Treaded socks in place. Hourly rounding in progress. Will continue to monitor .

## 2023-10-26 NOTE — CARE PLAN
The patient is Stable - Low risk of patient condition declining or worsening    Shift Goals  Clinical Goals: pain management  Patient Goals: comfort  Family Goals: elias    Progress made toward(s) clinical / shift goals:    Problem: Pain - Standard  Goal: Alleviation of pain or a reduction in pain to the patient’s comfort goal  Outcome: Progressing     Patient aaox4, reports pain at incisional site and scrotal area (2/2 edema). Patient rates pain 7-8/10, and seems to be controlled with prn oxycodone and scheduled morphine. Patient has no other complaints at this time. Encouraged to use call light for assistance

## 2023-10-26 NOTE — DISCHARGE PLANNING
[facility] Transitional Care Coordination    Referral from:  Dr. Marcia Davison  Insurance Provider on Facesheet:  [facility]  Potential Rehab diagnosis:      Chart review indicates patient has ongoing medical management and therapy needs to possibly meet inpatient rehab facility criteria with the goal of returning to community.      D/C Support      Physiatry to consult per protocol.  POD 4 s/p L BKA revision.  IV abx to 12/01/23.    Last Covid test:    Thank you for the referral.

## 2023-10-26 NOTE — CARE PLAN
The patient is Watcher - Medium risk of patient condition declining or worsening    Shift Goals  Clinical Goals: stable VS/labs, pain control, free from fall and skin breakdown  Patient Goals: pain control, comfort  Family Goals: not present at this time    Progress made toward(s) clinical / shift goals:    Problem: Pain - Standard  Goal: Alleviation of pain or a reduction in pain to the patient’s comfort goal  Outcome: Progressing  Note: Medicated with oxy 10 and scheduled Morphine tablets per MAR with adequate pain control per pt report. Pt resting in bed comfortably. Hourly rounding in progress     Problem: Knowledge Deficit - Standard  Goal: Patient and family/care givers will demonstrate understanding of plan of care, disease process/condition, diagnostic tests and medications  Outcome: Progressing  Note: Educated pt on POC, monitor VS/labs, pain control, mobility, IV antbx, safety, all questions answered, hourly rounding in progress     Problem: Skin Integrity  Goal: Skin integrity is maintained or improved  Outcome: Progressing  Note: Pt turns self from side to side, no skin breakdown noted, will continue to monitor       Patient is not progressing towards the following goals:

## 2023-10-26 NOTE — DISCHARGE PLANNING
Reached out to TCNs to explore patient's options for IV abx after rehab, TCC will continue to follow.

## 2023-10-26 NOTE — DISCHARGE PLANNING
"[facility] TCN chart review completed. Collaborated with LINSEY Almaraz.  Current discharge considerations are for post-acute placement.   Per chart review, patient currently on IV ABX and has wound vac with 6 weeks of IV ABX until 12/1/23.  Per chart review,  [facility] and [facility] have accepted.  [facility] Authorization was previously completed for [facility] and CM was notified.  Patient previously refused all SNF's however he is  agreeable to [facility], [facility] and [facility].  Patient refuses [facility] SNF.  [facility] and [facility] have declined.  [facility] and [facility] are pending.      Patient seen at bedside and requested to find what co-payments would be at each facility.  TCN assisted with calling [facility] for patient to find out co-payments of SNF, LTACH and IRF.      Per collaboration with Meg roa from [facility], Dr. Marcia Davison M.D. stated that a SNF could accommodate patients wound vac and IV ABX.  This TCN discussed MD recommendations with Dr. Marcia Davison and stated he will order a PM & R consult.  Patient stated, \"I have been to [facility] several times and Dr. Block at [facility] stated he will take me back at [facility] anytime\".        Per chart review, PT and OT recommend post-acute placement on 10/25/23.  PT recommendations obtained via Voalte message with PT [place].       TCN to get SNF choice once accepted by facility patient is willing to go to.  Patient stated he would be willing [facility].  [facility].  TCN will continue to follow and collaborate with discharge planning team as additional post acute needs arise. Thank you.    Completed:   PT and OT recommend post-acute placement on 10/25/23.   Choice obtained: [facility]F ([facility] ); ltach ([facility]).  "

## 2023-10-26 NOTE — CONSULTS
Physical Medicine and Rehabilitation Consultation              Date of initial consultation: 10/26/2023  Requesting provider: ordered by Leo Davison M.D. at 10/26/23 0953   Consulting provider: Angelique Cody D.O.  Reason for consultation: assess for acute inpatient rehab appropriateness  LOS: 8 Day(s)    Chief complaint: L BKA infection     HPI: The patient is a 48 y.o. male with a past medical history of  L BKA, RA on Humira , CHF, chronic pain, R charcot foot (followed by Dr. Calvin Pryor) and history of gastric bypass surgery;  who presented on 10/18/2023  8:32 PM with sepsis due to L BKA infection.  Per documentation, patient presented to the ED with AMS and lethargy. Work up revealed WBC 30, and CT LLE showed an enhancing fluid collection in the distal end of the L BKA consistent with osteomyelitis. Ortho and ID were consulted. Patient was started on IV fluids and empiric IV abx for septic shock. Patient was taken to the OR on 10/19 for I and D of L BKA with wound vac placement performed by Dr. Roe. Patient returned to the OR on 10/22 for L BKA revision performed by Dr. Colindres. Blood cultures returned + Mssa. Updated Echo done showed EF 45%, no evidence of vegetations. ID recommending IV Ancef 2g q8hr x 6 weeks, stop date 12/1/23.     Patient seen and examined at bedside. Patient reports he feels ok, is frustrated to be back in the hospital. Denies RA flare up. Report chronic pain in RLE is stable, charcot foot unchanged. Last Humira injection was about two weeks ago. Chronic pain meds unchanged. Patient is very interested in returning to rehab with goals to work on transfers and return to WC level. Reports being limited by pain in RLE at this time for transfers, is willing to work on sideboard transfers if Wbing status in RLE is not upgrade. Reviewed options for rehab. Reviewed with patient that his IV abx x 6 weeks will be the limiting issue for return to rehab, if patient does  not have access to home IV abx coverage he will need SNF for duration of IV abx. Patient agreeable.      Social Hx:  Patient lives with  spouse in a 1 story house with   0 HASEEB  At prior level of function MOD I with LLE prosthesis     Tobacco: intermittent cigarette and cigar use   Alcohol: former alcohol abuse   Drugs: Denies     THERAPY:  Restrictions: Fall Risk, NWB LLE, NWB RLE, history of L BKA   PT: Functional mobility   10/25 Min A rtansfers     OT: ADLs  10/25 Mod A lower body dressing     SLP:   None     IMAGING:  DX-CHEST-PORTABLE (1 VIEW)   Final Result           1.  Pulmonary edema and/or infiltrates are identified, which appear somewhat increased since the prior exam.   2.  Cardiomegaly       CT-EXTREMITY, LOWER WITH LEFT   Final Result           1.  Enhancing fluid collection at the distal soft tissues at below the knee amputation, appearance most compatible with abscess. Appears to communicate with the skin surface.   2.  Suspected abscess abuts the distal tibial stump concerning for changes of osteomyelitis.   3.  Diffuse subcutaneous fat stranding, compatible changes of edema and/or cellulitis.       CT-ABDOMEN-PELVIS WITH   Final Result           1.  4.6 cm mid abdominal hernia containing a portion of transverse without visualized obstructive changes   2.  Bilateral pulmonary infiltrates.   3.  Moderate right and trace left pleural effusion   4.  Diffuse subcutaneous fat stranding, appearance favoring changes of anasarca   5.  Atherosclerosis and atherosclerotic coronary artery disease       DX-TIBIA AND FIBULA LEFT   Final Result           1.  No acute traumatic bony injury.   2.  Soft tissue gas at the distal amputation site anteriorly, appearance suggests open wound and/or underlying infectious etiology. Correlate with exam.       DX-CHEST-PORTABLE (1 VIEW)   Final Result           1.  Pulmonary edema and/or infiltrates.   2.  Trace right pleural effusion   3.  Cardiomegaly        PROCEDURES:  10/19  L BKA I and D with wound vac placement by Dr. Roe   10/22  L BKA revision by Dr. Colindres     PMH:  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    Hemorrhagic disorder ([facility])     hx of bleeding ulcers    History of bleeding ulcers 2/12/2015    Hypertension 05/03/2021    pt states well controlled on meds    Insomnia 5/21/2009    Migraine 5/21/2009    Morning headache     Obesity, morbid ([facility]) 5/21/2009    Osteomyelitis ([facility]) 10/7/2019    Pain 09/2019    Chronic knee and back pain    Pubic ramus fracture ([facility]) 9/28/2019    Rheumatoid arteritis ([facility]) 09/2019    knee, feet right hand    Sleep apnea 5/21/2009    Did not tolerate cpap, does not use it    Snoring        PSH:  Past Surgical History:   Procedure Laterality Date    INCISION AND DRAINAGE ORTHOPEDIC Left 10/22/2023    Procedure: REVISION, BELOW KNEE AMPUTATION;  Surgeon: River Colindres M.D.;  Location: SURGERY [facility];  Service: Orthopedics    IRRIGATION & DEBRIDEMENT GENERAL Left 10/19/2023    Procedure: IRRIGATION AND DEBRIDEMENT, WOUND;  Surgeon: Jay Roe M.D.;  Location: SURGERY [facility];  Service: Trauma    KNEE AMPUTATION BELOW Left 3/9/2023    Procedure: AMPUTATION, BELOW KNEE;  Surgeon: Wayne Chambers M.D.;  Location: SURGERY [facility];  Service: Orthopedics    PB TOTAL KNEE ARTHROPLASTY Right 7/22/2020    Procedure:  ARTHROPLASTY, KNEE, TOTAL;  Surgeon: Temo Pires M.D.;  Location: SURGERY [facility];  Service: Orthopedics    TENDON TRANSFER Right 1/22/2020    Procedure: RIGHT DISTAL ULNA RESECTION AND EXTENSOR TENDON TRANSFER;  Surgeon: Marine Rushing M.D.;  Location: SURGERY [facility];  Service: Orthopedics    OTHER ORTHOPEDIC SURGERY Right 2020    total right knee replacement    IRRIGATION & DEBRIDEMENT ORTHO Left 10/9/2019    Procedure: IRRIGATION AND DEBRIDEMENT, WOUND - PELVIC OSTEOMYELITIS;  Surgeon: You Olivares M.D.;  Location: SURGERY [facility];  Service: Orthopedics    OTHER SURGICAL PROCEDURE  2019    osteomelitis of pelvis cleaned    GASTRIC BYPASS LAPAROSCOPIC  2000    Lucila en y    CHOLECYSTECTOMY  2000    OTHER SURGICAL PROCEDURE      repair of broken penis       FHX:  Family History   Problem Relation Age of Onset    Hypertension Mother     Arthritis Mother     Depression Mother     Cancer Father         bladder    Schizophrenia Father     Cancer Maternal Grandmother         breast    Heart Disease Maternal Grandmother     Psychiatric Illness Other     Stroke Maternal Aunt     Other Neg Hx         no connective tissue disorders or known aortic disease       Medications:  Current Facility-Administered Medications   Medication Dose    HYDROmorphone (Dilaudid) injection 1 mg  1 mg    acetaminophen (Tylenol) tablet 1,000 mg  1,000 mg    furosemide (Lasix) tablet 20 mg  20 mg    enoxaparin (Lovenox) inj 40 mg  40 mg    morphine (MS IR) tablet 30 mg  30 mg    zolpidem (Ambien) tablet 5 mg  5 mg    oxyCODONE immediate-release (Roxicodone) tablet 5-10 mg  5-10 mg    ceFAZolin (Ancef) 2 g in  mL IVPB  2 g    Nozin nasal  swab  1 Applicator    omeprazole (PriLOSEC) capsule 20 mg  20 mg    senna-docusate (Pericolace Or Senokot S) 8.6-50 MG per tablet 2 Tablet  2 Tablet    And    polyethylene glycol/lytes (Miralax) PACKET 1 Packet  1 Packet    And    magnesium hydroxide (Milk  "Of Magnesia) suspension 30 mL  30 mL    And    bisacodyl (Dulcolax) suppository 10 mg  10 mg    morphine ER (Ms Contin) tablet 15 mg  15 mg    pregabalin (Lyrica) capsule 150 mg  150 mg    DULoxetine (Cymbalta) capsule 60 mg  60 mg       Allergies:  Allergies   Allergen Reactions    Nsaids      Bleeding, \"I had a bleeding ulcer\"    Diphenhydramine Unspecified     \"I get agitated\"    Mirtazapine Unspecified     I had a hard time waking up, lacking mental focus.     Penicillins      \"Had some dizziness\"  Tolerated Unasyn 10/2019  Tolerated Zosyn 02/2023    Promethazine Unspecified     \"I get very agitated\"       Physical Exam:  Vitals: /57   Pulse 73   Temp 36.3 °C (97.3 °F) (Temporal)   Resp 15   Ht 1.778 m (5' 10\")   Wt 104 kg (229 lb 15 oz)   SpO2 94%   Gen: NAD, laying comfortably in bed   Head:  NC/AT  Eyes/ Nose/ Mouth: PERRLA, moist mucous membranes  Cardio: RRR, good distal perfusion, warm extremities  Pulm: normal respiratory effort, no cyanosis, on RA   Abd: Soft NTND, negative borborygmi   Ext: No peripheral edema. No calf tenderness. No clubbing. L BKA residual limb with acewrap dressing, no strike through     Mental status:  A&Ox4 (person, place, date, situation) answers questions appropriately follows commands  Speech: fluent, no aphasia or dysarthria    CRANIAL NERVES:  2,3: visual acuity grossly intact, PERRL  3,4,6: EOMI bilaterally, no nystagmus or diplopia  5: sensation intact to light touch bilaterally and symmetric  7: no facial asymmetry  8: hearing grossly intact      Motor:      Upper Extremity  Myotome R L   Shoulder flexion C5 5/5 5/5   Elbow flexion C5 5/5 5/5   Wrist extension C6 5/5 5/5   Elbow extension C7 5/5 5/5   Finger flexion C8 5/5 5/5   Finger abduction T1 5/5 5/5     Lower Extremity Myotome R L   Hip flexion L2 5/5 5/5   Knee extension L3 5/5 At least 3/5   Ankle dorsiflexion L4 4/5 NA   Toe extension L5 4/5 NA   Ankle plantarflexion S1 4/5 NA        Sensory:   intact " to light touch through out    DTRs: 2+ in bilateral  biceps    Tone: no spasticity noted    Coordination:   intact finger to nose bilaterally  intact fine motor with fingers bilaterally      Labs: Reviewed and significant for   Recent Labs     10/24/23  0423 10/25/23  0500 10/26/23  0430   RBC 3.29* 3.54* 3.46*   HEMOGLOBIN 9.1* 10.1* 9.8*   HEMATOCRIT 30.3* 32.1* 31.4*   PLATELETCT 141* 142* 148*     Recent Labs     10/24/23  0423 10/25/23  0500 10/26/23  0430   SODIUM 139 137 136   POTASSIUM 4.4 4.5 4.4   CHLORIDE 103 100 100   CO2 33 31 33   GLUCOSE 83 74 88   BUN 11 10 9   CREATININE 0.49* 0.54 0.46*   CALCIUM 7.4* 7.7* 8.0*     Recent Results (from the past 24 hour(s))   EKG    Collection Time: 10/25/23  3:46 PM   Result Value Ref Range    Report       [facility]    Test Date:  2023-10-25  Pt Name:    LESLIE BADILLO             Department: 131  MRN:        [id number]                      Room:       [facility]  Gender:     Male                         Technician: [facility]  :        1975                   Requested By:MARY HAY  Order #:    [id number]                    Reading MD: Alexx Vernon MD    Measurements  Intervals                                Axis  Rate:       109                          P:          11  OH:         141                          QRS:        -9  QRSD:       102                          T:          64  QT:         392  QTc:        529    Interpretive Statements  Sinus tachycardia  Ventricular bigeminy  Electronically Signed On 10- 17:00:32 PDT by Alexx Vernon MD     CBC WITHOUT DIFFERENTIAL    Collection Time: 10/26/23  4:30 AM   Result Value Ref Range    WBC 8.3 4.8 - 10.8 K/uL    RBC 3.46 (L) 4.70 - 6.10 M/uL    Hemoglobin 9.8 (L) 14.0 - 18.0 g/dL    Hematocrit 31.4 (L) 42.0 - 52.0 %    MCV 90.8 81.4 - 97.8 fL    MCH 28.3 27.0 - 33.0 pg    MCHC 31.2 (L) 32.3 - 36.5 g/dL    RDW 58.4 (H) 35.9 - 50.0 fL    Platelet Count 148 (L) 164 - 446 K/uL    MPV 9.8  9.0 - 12.9 fL   Comp Metabolic Panel    Collection Time: 10/26/23  4:30 AM   Result Value Ref Range    Sodium 136 135 - 145 mmol/L    Potassium 4.4 3.6 - 5.5 mmol/L    Chloride 100 96 - 112 mmol/L    Co2 33 20 - 33 mmol/L    Anion Gap 3.0 (L) 7.0 - 16.0    Glucose 88 65 - 99 mg/dL    Bun 9 8 - 22 mg/dL    Creatinine 0.46 (L) 0.50 - 1.40 mg/dL    Calcium 8.0 (L) 8.5 - 10.5 mg/dL    Correct Calcium 9.4 8.5 - 10.5 mg/dL    AST(SGOT) 13 12 - 45 U/L    ALT(SGPT) <5 2 - 50 U/L    Alkaline Phosphatase 94 30 - 99 U/L    Total Bilirubin 0.2 0.1 - 1.5 mg/dL    Albumin 2.3 (L) 3.2 - 4.9 g/dL    Total Protein 5.6 (L) 6.0 - 8.2 g/dL    Globulin 3.3 1.9 - 3.5 g/dL    A-G Ratio 0.7 g/dL   ESTIMATED GFR    Collection Time: 10/26/23  4:30 AM   Result Value Ref Range    GFR (CKD-EPI) 130 >60 mL/min/1.73 m 2         ASSESSMENT:  Patient is a 48 y.o. male admitted with L BKA osteomyelitis     [facility] Code / Diagnosis to Support: 0005.4 - Amputation: Unilateral Lower Limb Below the Knee (BK)    Rehabilitation: Impaired ADLs and mobility  Patient is a potential candidate for inpatient rehab based on needs for PT, OT, however pending confirmation of access to home IV abx will determine eligibility.     Barriers to transfer include: Insurance authorization, TCCs to verify disposition, medical clearance and bed availability     Additional Recommendations:  L BKA osteomyelitis   Sepsis   - presented with AMS and lethargy, found to have WBC 30   - CT LLE showed concern for osteomyelitis at L BKA stump   - ortho consulted   - 10/ 19  LBKA I and D with wound vac placement by Dr. Roe  - return to OR 10/22 for L BKA revision by Dr. Colindres   - NWB LLE   - ID following, patient is on 6 weeks IV Ancef 2g q8hrs, will need confirmation that patient would be able to access this at home   - continue with PT/OT     RA   - known history of RA, limited in function in BUE   - on humira at home   - chronically immune suppressed     RLE charcot foot  -  "chronic R foot pain   - currently NWB RLE, reviewed Calvin Terrell's note , ok for \"Touchdown weightbearing on the right lower extremity for transfers\"     ABLA   - post op Hgb 9.8   - Hgb down trending   - primary team monitoring CBC     Chronic pain   - [facility] for [facility] management   - curently on morphine 30mg Q6hrs and MS contin 15mg q12h, this is patient's chronic dose     Dispo:  - patient is currently functioning below their level of baseline, recommend post acute rehab  - recommend IRF level therapy with 3hr of therapy 5 days per week ONLY IF patient has access to home IV ancef q8hrs after DC from rehab   - piror to acceptance to IRF, will need insurance auth  - TCC to assist with insurance auth and DC support         Medical Complexity:  L BKA osteomyelitis   Sepsis   RA   ABLA   Chronic pain   Impaired mobility and ADLs       DVT PPX: Lovenox       Thank you for allowing us to participate in the care of this patient.     Patient was seen for 82 minutes on unit/floor of which > 50% of time was spent on counseling and coordination of care regarding the above, including prognosis, risk reduction, benefits of treatment, and options for next stage of care.    Angelique Cody D.O.   Physical Medicine and Rehabilitation     Please note that this dictation was created using voice recognition software. I have made every reasonable attempt to correct obvious errors, but there may be errors of grammar and possibly content that I did not discover before finalizing the note.            "

## 2023-10-27 PROBLEM — T87.9 BKA STUMP COMPLICATION (HCC): Status: ACTIVE | Noted: 2023-01-01

## 2023-10-27 NOTE — DISCHARGE PLANNING
0957: Spoke with patient who reports he sees MAGNUS Bailey at [facility] [phone]. Pt reports he has a follow-up appointment scheduled 11/1, called office and left a vm.    Per Dr Kennedy anticipate clearance today, forwarded PAS to Dr Pisano to review.    1030: Patient has been accepted by Dr Pisano at [facility]. Transport set for 12/1230 GMT w/c, nurse report [id number]. Notified care team.

## 2023-10-27 NOTE — CARE PLAN
The patient is Stable - Low risk of patient condition declining or worsening    Shift Goals  Clinical Goals: pain control, free from fall  Patient Goals: pain control,comfort  Family Goals: no family present    Progress made toward(s) clinical / shift goals:    Problem: Pain - Standard  Goal: Alleviation of pain or a reduction in pain to the patient’s comfort goal  Outcome: Progressing  Note: Patient complained of  pain on left leg, medicated per MAR and patient reported adequate pain control.Resting in bed,will continue to monitor.     Problem: Knowledge Deficit - Standard  Goal: Patient and family/care givers will demonstrate understanding of plan of care, disease process/condition, diagnostic tests and medications  Outcome: Progressing  Note: Patient educated on pain control, safety,mobility and IV Abx, questions answered.Will continue to monitor.     Problem: Fall Risk  Goal: Patient will remain free from falls  Outcome: Progressing  Note: Safety precautions are in place including bed locked and in lowest position, upper bed rails up, bed alarm on, call light within reach, treaded socks on, tray table and personal belongings within reach. No fall at this time.       Patient is not progressing towards the following goals:

## 2023-10-27 NOTE — PROGRESS NOTES
1250 Pt arrived at [facility] from [facility] via transport. Dr. Pisano to follow for diagnosis of BKA. Initial assessment initiated. Pt oriented to room and facility routine and safety measures; pt education binder provided and discussed. Pt A/O x 4, continent of bowel and bladder. Moderate assist for transfers. All wounds photographed and documented; photos uploaded to . Pt's pain controlled with scheduled pain medication. Pt positioned for comfort in bed. Call light within reach, safety measures in place. Will continue to monitor.

## 2023-10-27 NOTE — PREADMISSION SCREENING NOTE
"  Pre-Admission Screening Form    Patient Information:   Name: Richard Hubbard II     MRN: [id number]       : 1975      Age: 48 y.o.   Gender: male      Race: White [7]       Marital Status:  [2]  Family Contact: Katiana Hubbard Linda        Relationship: Spouse [17]  Mother [8]  Home Phone:   [phone]           Cell Phone: [phone]  Advanced Directives: None  Code Status:  FULL  Current Attending Provider: Leo Davison*  Referring Physician: Dr. Kennedy      Physiatrist Consult: Dr. Cody       Referral Date: 10/26/23  Primary Payor Source:  [facility]  Secondary Payor Source:      Medical Information:   Date of Admission to Acute Care Setting:10/18/2023  Room Number: T301/01  Rehabilitation Diagnosis: 0005.4 - Amputation: Unilateral Lower Limb Below the Knee (BK)  Immunization History   Administered Date(s) Administered    Influenza Vaccine Quad Inj (Pf) 2017, 2018, 10/17/2019, 2020, 2021    Influenza Vaccine Quad Inj (Preserved) 2015, 2016    Influenza, Unspecified - HISTORICAL DATA 2020    MODERNA SARS-COV-2 VACCINE (12+) 2021, 2021, 2021    Tdap Vaccine 2020     Allergies   Allergen Reactions    Nsaids      Bleeding, \"I had a bleeding ulcer\"    Diphenhydramine Unspecified     \"I get agitated\"    Mirtazapine Unspecified     I had a hard time waking up, lacking mental focus.     Penicillins      \"Had some dizziness\"  Tolerated Unasyn 10/2019  Tolerated Zosyn 2023    Promethazine Unspecified     \"I get very agitated\"     Past Medical History:   Diagnosis Date    ADD (attention deficit disorder)     Alcohol abuse     Allergic rhinitis 2009    Anemia 2019    Anxiety 2009    Apnea, sleep     Arrhythmia     afib when hospitaized for infection    Back pain 2009    Bipolar disorder (HCC)     Bleeding ulcer 2009    Bleeding ulcer 2009    History " of anemia-now on nexium     Bowel habit changes     constipation related to narcotics    Daytime sleepiness     Depression 5/21/2009    Eczema 5/21/2009    GERD (gastroesophageal reflux disease)     Heart burn     on meds    Heart murmur     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    Hemorrhagic disorder (HCC)     hx of bleeding ulcers    History of bleeding ulcers 2/12/2015    Hypertension 05/03/2021    pt states well controlled on meds    Insomnia 5/21/2009    Migraine 5/21/2009    Morning headache     Obesity, morbid ([facility]) 5/21/2009    Osteomyelitis ([facility]) 10/7/2019    Pain 09/2019    Chronic knee and back pain    Pubic ramus fracture ([facility]) 9/28/2019    Rheumatoid arteritis ([facility]) 09/2019    knee, feet right hand    Sleep apnea 5/21/2009    Did not tolerate cpap, does not use it    Snoring      Past Surgical History:   Procedure Laterality Date    INCISION AND DRAINAGE ORTHOPEDIC Left 10/22/2023    Procedure: REVISION, BELOW KNEE AMPUTATION;  Surgeon: River Colindres M.D.;  Location: [facility];  Service: Orthopedics    IRRIGATION & DEBRIDEMENT GENERAL Left 10/19/2023    Procedure: IRRIGATION AND DEBRIDEMENT, WOUND;  Surgeon: Jay Roe M.D.;  Location: [facility];  Service: Trauma    KNEE AMPUTATION BELOW Left 3/9/2023    Procedure: AMPUTATION, BELOW KNEE;  Surgeon: Wayne Chambers M.D.;  Location: [facility];  Service: Orthopedics    PB TOTAL KNEE ARTHROPLASTY Right 7/22/2020    Procedure: ARTHROPLASTY, KNEE, TOTAL;  Surgeon: Temo Pires M.D.;  Location: [facility];  Service: Orthopedics    TENDON TRANSFER Right 1/22/2020    Procedure: RIGHT DISTAL ULNA RESECTION AND EXTENSOR TENDON TRANSFER;  Surgeon: Marine Rushing M.D.;  Location: [facility];  Service: Orthopedics    OTHER ORTHOPEDIC SURGERY Right 2020    total right knee replacement    IRRIGATION & DEBRIDEMENT ORTHO Left 10/9/2019    Procedure: IRRIGATION AND DEBRIDEMENT, WOUND - PELVIC  OSTEOMYELITIS;  Surgeon: You Olivares M.D.;  Location: [facility];  Service: Orthopedics    OTHER SURGICAL PROCEDURE  2019    osteomelitis of pelvis cleaned    GASTRIC BYPASS LAPAROSCOPIC  2000    Lucila en y    CHOLECYSTECTOMY  2000    OTHER SURGICAL PROCEDURE      repair of broken penis       History Leading to Admission, Conditions that Caused the Need for Rehab (CMS):     Physical Medicine and Rehabilitation Consultation                                                                                  Date of initial consultation: 10/26/2023  Requesting provider: ordered by Leo Davison M.D. at 10/26/23 0953   Consulting provider: Angelique Cody D.O.  Reason for consultation: assess for acute inpatient rehab appropriateness  LOS: 8 Day(s)     Chief complaint: L BKA infection      HPI: The patient is a 48 y.o. male with a past medical history of  L BKA, RA on Humira , CHF, chronic pain, R charcot foot (followed by Dr. Calvin Pryor) and history of gastric bypass surgery;  who presented on 10/18/2023  8:32 PM with sepsis due to L BKA infection.  Per documentation, patient presented to the ED with AMS and lethargy. Work up revealed WBC 30, and CT LLE showed an enhancing fluid collection in the distal end of the L BKA consistent with osteomyelitis. Ortho and ID were consulted. Patient was started on IV fluids and empiric IV abx for septic shock. Patient was taken to the OR on 10/19 for I and D of L BKA with wound vac placement performed by Dr. Roe. Patient returned to the OR on 10/22 for L BKA revision performed by Dr. Colindres. Blood cultures returned + Mssa. Updated Echo done showed EF 45%, no evidence of vegetations. ID recommending IV Ancef 2g q8hr x 6 weeks, stop date 12/1/23.      Patient seen and examined at bedside. Patient reports he feels ok, is frustrated to be back in the hospital. Denies RA flare up. Report chronic pain in RLE is stable, charcot foot unchanged.  Last Humira injection was about two weeks ago. Chronic pain meds unchanged. Patient is very interested in returning to rehab with goals to work on transfers and return to WC level. Reports being limited by pain in RLE at this time for transfers, is willing to work on sideboard transfers if Wbing status in RLE is not upgrade. Reviewed options for rehab. Reviewed with patient that his IV abx x 6 weeks will be the limiting issue for return to rehab, if patient does not have access to home IV abx coverage he will need SNF for duration of IV abx. Patient agreeable.       Social Hx:  Patient lives with  spouse in a 1 story house with   0 HASEEB  At prior level of function MOD I with LLE prosthesis      Tobacco: intermittent cigarette and cigar use   Alcohol: former alcohol abuse   Drugs: Denies      THERAPY:  Restrictions: Fall Risk, NWB LLE, NWB RLE, history of L BKA   PT: Functional mobility   10/25 Min A rtansfers      OT: ADLs  10/25 Mod A lower body dressing      SLP:   None      IMAGING:  DX-CHEST-PORTABLE (1 VIEW)   Final Result           1.  Pulmonary edema and/or infiltrates are identified, which appear somewhat increased since the prior exam.   2.  Cardiomegaly       CT-EXTREMITY, LOWER WITH LEFT   Final Result           1.  Enhancing fluid collection at the distal soft tissues at below the knee amputation, appearance most compatible with abscess. Appears to communicate with the skin surface.   2.  Suspected abscess abuts the distal tibial stump concerning for changes of osteomyelitis.   3.  Diffuse subcutaneous fat stranding, compatible changes of edema and/or cellulitis.       CT-ABDOMEN-PELVIS WITH   Final Result           1.  4.6 cm mid abdominal hernia containing a portion of transverse without visualized obstructive changes   2.  Bilateral pulmonary infiltrates.   3.  Moderate right and trace left pleural effusion   4.  Diffuse subcutaneous fat stranding, appearance favoring changes of anasarca   5.   "Atherosclerosis and atherosclerotic coronary artery disease       DX-TIBIA AND FIBULA LEFT   Final Result           1.  No acute traumatic bony injury.   2.  Soft tissue gas at the distal amputation site anteriorly, appearance suggests open wound and/or underlying infectious etiology. Correlate with exam.       DX-CHEST-PORTABLE (1 VIEW)   Final Result           1.  Pulmonary edema and/or infiltrates.   2.  Trace right pleural effusion   3.  Cardiomegaly         PROCEDURES:  10/19  L BKA I and D with wound vac placement by Dr. Roe   10/22  L BKA revision by Dr. Colindres      Allergies:        Allergies   Allergen Reactions    Nsaids         Bleeding, \"I had a bleeding ulcer\"    Diphenhydramine Unspecified       \"I get agitated\"    Mirtazapine Unspecified       I had a hard time waking up, lacking mental focus.     Penicillins         \"Had some dizziness\"  Tolerated Unasyn 10/2019  Tolerated Zosyn 02/2023    Promethazine Unspecified       \"I get very agitated\"         Physical Exam:  Vitals: /57   Pulse 73   Temp 36.3 °C (97.3 °F) (Temporal)   Resp 15   Ht 1.778 m (5' 10\")   Wt 104 kg (229 lb 15 oz)   SpO2 94%   Gen: NAD, laying comfortably in bed   Head:  NC/AT  Eyes/ Nose/ Mouth: PERRLA, moist mucous membranes  Cardio: RRR, good distal perfusion, warm extremities  Pulm: normal respiratory effort, no cyanosis, on RA   Abd: Soft NTND, negative borborygmi   Ext: No peripheral edema. No calf tenderness. No clubbing. L BKA residual limb with acewrap dressing, no strike through      Mental status:  A&Ox4 (person, place, date, situation) answers questions appropriately follows commands  Speech: fluent, no aphasia or dysarthria     CRANIAL NERVES:  2,3: visual acuity grossly intact, PERRL  3,4,6: EOMI bilaterally, no nystagmus or diplopia  5: sensation intact to light touch bilaterally and symmetric  7: no facial asymmetry  8: hearing grossly intact        Motor:                            Upper Extremity  " Myotome R L   Shoulder flexion C5 5/5 5/5   Elbow flexion C5 5/5 5/5   Wrist extension C6 5/5 5/5   Elbow extension C7 5/5 5/5   Finger flexion C8 5/5 5/5   Finger abduction T1 5/5 5/5      Lower Extremity Myotome R L   Hip flexion L2 5/5 5/5   Knee extension L3 5/5 At least 3/5   Ankle dorsiflexion L4 4/5 NA   Toe extension L5 4/5 NA   Ankle plantarflexion S1 4/5 NA          Sensory:   intact to light touch through out     DTRs: 2+ in bilateral  biceps     Tone: no spasticity noted     Coordination:   intact finger to nose bilaterally  intact fine motor with fingers bilaterally        Labs: Reviewed and significant for         Recent Labs     10/24/23  0423 10/25/23  0500 10/26/23  0430   RBC 3.29* 3.54* 3.46*   HEMOGLOBIN 9.1* 10.1* 9.8*   HEMATOCRIT 30.3* 32.1* 31.4*   PLATELETCT 141* 142* 148*            Recent Labs     10/24/23  0423 10/25/23  0500 10/26/23  0430   SODIUM 139 137 136   POTASSIUM 4.4 4.5 4.4   CHLORIDE 103 100 100   CO2 33 31 33   GLUCOSE 83 74 88   BUN 11 10 9   CREATININE 0.49* 0.54 0.46*   CALCIUM 7.4* 7.7* 8.0*      Recent Results          Recent Results (from the past 24 hour(s))   EKG     Collection Time: 10/25/23  3:46 PM   Result Value Ref Range     Report           [facility]     Test Date:  2023-10-25  Pt Name:    LESLIE BADILLO             Department: 131  MRN:        [id number]                      Room:       [facility]  Gender:     Male                         Technician: DON  :        1975                   Requested By:MARY HAY  Order #:    [id number]                    Reading MD: Alexx Vernon MD     Measurements  Intervals                                Axis  Rate:       109                          P:          11  MT:         141                          QRS:        -9  QRSD:       102                          T:          64  QT:         392  QTc:        529     Interpretive Statements  Sinus tachycardia  Ventricular bigeminy  Electronically Signed  On 10- 17:00:32 PDT by Alexx Vernon MD      CBC WITHOUT DIFFERENTIAL     Collection Time: 10/26/23  4:30 AM   Result Value Ref Range     WBC 8.3 4.8 - 10.8 K/uL     RBC 3.46 (L) 4.70 - 6.10 M/uL     Hemoglobin 9.8 (L) 14.0 - 18.0 g/dL     Hematocrit 31.4 (L) 42.0 - 52.0 %     MCV 90.8 81.4 - 97.8 fL     MCH 28.3 27.0 - 33.0 pg     MCHC 31.2 (L) 32.3 - 36.5 g/dL     RDW 58.4 (H) 35.9 - 50.0 fL     Platelet Count 148 (L) 164 - 446 K/uL     MPV 9.8 9.0 - 12.9 fL   Comp Metabolic Panel     Collection Time: 10/26/23  4:30 AM   Result Value Ref Range     Sodium 136 135 - 145 mmol/L     Potassium 4.4 3.6 - 5.5 mmol/L     Chloride 100 96 - 112 mmol/L     Co2 33 20 - 33 mmol/L     Anion Gap 3.0 (L) 7.0 - 16.0     Glucose 88 65 - 99 mg/dL     Bun 9 8 - 22 mg/dL     Creatinine 0.46 (L) 0.50 - 1.40 mg/dL     Calcium 8.0 (L) 8.5 - 10.5 mg/dL     Correct Calcium 9.4 8.5 - 10.5 mg/dL     AST(SGOT) 13 12 - 45 U/L     ALT(SGPT) <5 2 - 50 U/L     Alkaline Phosphatase 94 30 - 99 U/L     Total Bilirubin 0.2 0.1 - 1.5 mg/dL     Albumin 2.3 (L) 3.2 - 4.9 g/dL     Total Protein 5.6 (L) 6.0 - 8.2 g/dL     Globulin 3.3 1.9 - 3.5 g/dL     A-G Ratio 0.7 g/dL   ESTIMATED GFR     Collection Time: 10/26/23  4:30 AM   Result Value Ref Range     GFR (CKD-EPI) 130 >60 mL/min/1.73 m 2               ASSESSMENT:  Patient is a 48 y.o. male admitted with L BKA osteomyelitis      [facility] Code / Diagnosis to Support: 0005.4 - Amputation: Unilateral Lower Limb Below the Knee (BK)     Rehabilitation: Impaired ADLs and mobility  Patient is a potential candidate for inpatient rehab based on needs for PT, OT, however pending confirmation of access to home IV abx will determine eligibility.      Barriers to transfer include: Insurance authorization, TCCs to verify disposition, medical clearance and bed availability      Additional Recommendations:  L BKA osteomyelitis   Sepsis   - presented with AMS and lethargy, found to have WBC 30   - CT LLE showed  "concern for osteomyelitis at L BKA stump   - ortho consulted   - 10/ 19  LBDAISY I and D with wound vac placement by Dr. Roe  - return to OR 10/22 for L BKA revision by Dr. Colindres   - NWB LLE   - ID following, patient is on 6 weeks IV Ancef 2g q8hrs, will need confirmation that patient would be able to access this at home   - continue with PT/OT      RA   - known history of RA, limited in function in BUE   - on humira at home   - chronically immune suppressed      RLE charcot foot  - chronic R foot pain   - currently NWB RLE, reviewed Calvin Terrell's note , ok for \"Touchdown weightbearing on the right lower extremity for transfers\"      ABLA   - post op Hgb 9.8   - Hgb down trending   - primary team monitoring CBC      Chronic pain   - followed by [facility] for outpatient management   - curently on morphine 30mg Q6hrs and MS contin 15mg q12h, this is patient's chronic dose      Dispo:  - patient is currently functioning below their level of baseline, recommend post acute rehab  - recommend IRF level therapy with 3hr of therapy 5 days per week ONLY IF patient has access to home IV ancef q8hrs after DC from rehab   - piror to acceptance to IRF, will need insurance auth  - TCC to assist with insurance auth and DC support            Medical Complexity:  L BKA osteomyelitis   Sepsis   RA   ABLA   Chronic pain   Impaired mobility and ADLs         DVT PPX: Lovenox         Thank you for allowing us to participate in the care of this patient.      Patient was seen for 82 minutes on unit/floor of which > 50% of time was spent on counseling and coordination of care regarding the above, including prognosis, risk reduction, benefits of treatment, and options for next stage of care.     Angelique Cody D.O.   Physical Medicine and Rehabilitation      Please note that this dictation was created using voice recognition software. I have made every reasonable attempt to correct obvious errors, but there may be errors of " grammar and possibly content that I did not discover before finalizing the note.       [facility] ICU H&P Note        Admit Date:    10/18/2023     Resident(s):   Rasheed Dent M.D.   Attending:      RADHA MIRANDA/ Dr. Saravia     Patient ID:    Name:              Richard Hubbard   YOB: 1975  Age:                 48 y.o.  male     MRN:               [id number]     Hospital Course (carried forward and updated):  Richard Hubbard II is a 48 y.o. male with past medical history significant for osteomyelitis status post left BKA (3/2023), alcohol abuse disorder, rheumatoid arthritis on immunotherapy, obstructive sleep apnea who was brought to the emergency department after he was confused and not answering questions.     Patient unable to provide significant history as he is quite somnolent however easily arousable.  Per chart review, patient's wife felt as if the patient was getting altered and having worsening pain and erythema of left lower extremity stump.  Patient recently had a BKA of his left lower extremity on March of this year.  Patient was also scheduled to have a right subtalar dislocation open reduction procedure on 10/25/2023.     In the ED, initial vitals of temperature of 102.2 Fahrenheit, respiratory rate 31, pulse of 110, blood pressure of 9455, and 85% on pulse oximetry.  Labs are significant for white blood cell count of 32.9, hemoglobin of 10.4, sodium 134, proBNP of over 32,000, albumin of 2.7. COVID/Flu/RSV negative.  CT abdomen pelvis demonstrated 4.6 cm mid abdominal hernia without obstructive changes.  CT left lower extremity demonstrated enhancing fluid collection and suspected changes for osteomyelitis.        Consultants:  Critical Care  Orthopedics      Past Medical History        Past Medical History:   Diagnosis Date    ADD (attention deficit disorder)      Alcohol abuse      Allergic rhinitis 5/21/2009    Anemia 09/2019    Anxiety 5/21/2009    Apnea, sleep       Arrhythmia       afib when hospitaized for infection    Back pain 5/21/2009    Bipolar disorder (HCC)      Bleeding ulcer 5/21/2009    Bleeding ulcer 5/21/2009     History of anemia-now on nexium     Bowel habit changes       constipation related to narcotics    Daytime sleepiness      Depression 5/21/2009    Eczema 5/21/2009    GERD (gastroesophageal reflux disease)      Heart burn       on meds    Heart murmur       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    Hemorrhagic disorder (HCC)       hx of bleeding ulcers    History of bleeding ulcers 2/12/2015    Hypertension 05/03/2021     pt states well controlled on meds    Insomnia 5/21/2009    Migraine 5/21/2009    Morning headache      Obesity, morbid ([facility]) 5/21/2009    Osteomyelitis ([facility]) 10/7/2019    Pain 09/2019     Chronic knee and back pain    Pubic ramus fracture ([facility]) 9/28/2019    Rheumatoid arteritis ([facility]) 09/2019     knee, feet right hand    Sleep apnea 5/21/2009     Did not tolerate cpap, does not use it    Snoring              Past Surgical History         Past Surgical History:   Procedure Laterality Date    KNEE AMPUTATION BELOW Left 3/9/2023     Procedure: AMPUTATION, BELOW KNEE;  Surgeon: Wayne Chambers M.D.;  Location: [facility];  Service: Orthopedics    PB TOTAL KNEE ARTHROPLASTY Right 7/22/2020     Procedure: ARTHROPLASTY, KNEE, TOTAL;  Surgeon: Temo Pires M.D.;  Location: [facility];  Service: Orthopedics    TENDON TRANSFER Right 1/22/2020     Procedure: RIGHT DISTAL ULNA RESECTION AND EXTENSOR TENDON TRANSFER;  Surgeon: Marine Rushing M.D.;  Location: [facility];  Service: Orthopedics    OTHER ORTHOPEDIC SURGERY Right 2020     total right knee replacement    IRRIGATION & DEBRIDEMENT ORTHO Left 10/9/2019     Procedure: IRRIGATION AND DEBRIDEMENT, WOUND - PELVIC OSTEOMYELITIS;  Surgeon: You Olivares M.D.;  Location: [facility];  Service: Orthopedics    OTHER SURGICAL PROCEDURE   2019      osteomelitis of pelvis cleaned    GASTRIC BYPASS LAPAROSCOPIC        Lucila en y    CHOLECYSTECTOMY       OTHER SURGICAL PROCEDURE         repair of broken penis            Social History               Socioeconomic History    Marital status:        Spouse name: Not on file    Number of children: Not on file    Years of education: Not on file    Highest education level: Bachelor's degree (e.g., BA, AB, BS)   Occupational History    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   Tobacco Use    Smoking status: Some Days       Current packs/day: 0.00       Types: Cigarettes, Cigars       Last attempt to quit: 2023       Years since quittin.7       Passive exposure: Current    Smokeless tobacco: Never    Tobacco comments:       occasional cigar   Vaping Use    Vaping Use: Never used   Substance and Sexual Activity    Alcohol use: No       Alcohol/week: 0.0 oz       Comment: quit 2011 ago- past ETOH abuse    Drug use: No    Sexual activity: Yes       Partners: Female       Comment: ;    Other Topics Concern     Service No    Blood Transfusions Yes    Caffeine Concern No    Occupational Exposure No    Hobby Hazards No    Sleep Concern No    Stress Concern Yes    Weight Concern Yes    Special Diet No    Back Care No    Exercise No    Bike Helmet No    Seat Belt Yes    Self-Exams Yes   Social History Narrative     , lives in [place]      Social Determinants of Health           Financial Resource Strain: Low Risk  (2023)     Overall Financial Resource Strain (CARDIA)      Difficulty of Paying Living Expenses: Not very hard   Food Insecurity: No Food Insecurity (2023)     Hunger Vital Sign      Worried About Running Out of Food in the Last Year: Never true      Ran Out of Food in the Last Year: Never true   Transportation Needs: Unmet Transportation Needs (2023)     PRAPARE - Transportation      Lack of Transportation (Medical): Yes      Lack of Transportation (Non-Medical): Yes    Physical Activity: Sufficiently Active (9/7/2023)     Exercise Vital Sign      Days of Exercise per Week: 7 days      Minutes of Exercise per Session: 30 min   Stress: Stress Concern Present (9/7/2023)     [place] [employer] - Occupational Stress Questionnaire      Feeling of Stress : Rather much   Social Connections: Socially Integrated (9/7/2023)     Social Connection and Isolation Panel [NHANES]      Frequency of Communication with Friends and Family: More than three times a week      Frequency of Social Gatherings with Friends and Family: Twice a week      Attends Mandaeism Services: More than 4 times per year      Active Member of Clubs or Organizations: Yes      Attends Club or Organization Meetings: More than 4 times per year      Marital Status:    Intimate Partner Violence: Not At Risk (9/7/2023)     Humiliation, Afraid, Rape, and Kick questionnaire      Fear of Current or Ex-Partner: No      Emotionally Abused: No      Physically Abused: No      Sexually Abused: No   Housing Stability: Low Risk  (9/7/2023)     Housing Stability Vital Sign      Unable to Pay for Housing in the Last Year: No      Number of Places Lived in the Last Year: 1      Unstable Housing in the Last Year: No               Vitals Range last 24h:  Temp:  [37.9 °C (100.3 °F)-39 °C (102.2 °F)] 37.9 °C (100.3 °F)  Pulse:  [] 88  Resp:  [12-45] 25  BP: ()/(48-59) 87/53  SpO2:  [85 %-100 %] 96 %        Intake/Output Summary (Last 24 hours) at 10/19/2023 0128  Last data filed at 10/19/2023 0045      Gross per 24 hour   Intake 2833.34 ml   Output --   Net 2833.34 ml         Review of Systems   Unable to perform ROS: Acuity of condition         PHYSICAL EXAM:  Vitals          Vitals:     10/19/23 0030 10/19/23 0045 10/19/23 0100 10/19/23 0115   BP: 94/50 95/53 (!) 85/55 (!) 87/53   Pulse: 91 91 89 88   Resp: 12 12 13 (!) 25   Temp:           TempSrc:           SpO2: 96% 94% 93% 96%   Weight:   104 kg  "(229 lb 15 oz)       Height:   1.778 m (5' 10\")           Body mass index is 32.99 kg/m².     O2 therapy: Pulse Oximetry: 96 %, O2 (LPM): 10, O2 Delivery Device: Oxymask                         Physical Exam  Vitals and nursing note reviewed.   HENT:      Head: Normocephalic and atraumatic.      Right Ear: External ear normal.      Left Ear: External ear normal.      Nose: No rhinorrhea.      Mouth/Throat:      Mouth: Mucous membranes are moist.   Eyes:      Extraocular Movements: Extraocular movements intact.      Conjunctiva/sclera: Conjunctivae normal.   Cardiovascular:      Rate and Rhythm: Regular rhythm. Tachycardia present.      Heart sounds: Murmur heard.      No gallop.      Comments: 4/6 systolic murmur  Pulmonary:      Effort: Pulmonary effort is normal.      Breath sounds: Rhonchi present. No wheezing.   Abdominal:      General: Bowel sounds are normal. There is no distension.      Tenderness: There is no abdominal tenderness.      Comments: Ventral hernia, reducible   Musculoskeletal:         General: Swelling, tenderness and deformity present.      Cervical back: Normal range of motion and neck supple.      Right lower leg: No edema.      Left lower leg: No edema.      Comments: LLE erythema and warm to tough     RLE has a healing wound on palmar aspect   Skin:     General: Skin is warm and dry.      Capillary Refill: Capillary refill takes 2 to 3 seconds.      Coloration: Skin is not jaundiced.                Recent Labs     10/19/23  0011   AZPNZ54H 7.37*   [id number] 44.2*   [id number] 96.7*   GYMP7RFR 96.2   ARTHCO3 25   A4KOQGBCT 10L   ARTBE 0           Recent Labs     10/18/23  2050 10/18/23  2326   SODIUM 138 134*   POTASSIUM 4.2 3.9   CHLORIDE 100 100   CO2 27 26   BUN 17 19   CREATININE 0.96 0.93   CALCIUM 8.2* 7.5*           Recent Labs     10/18/23  2050 10/18/23  2326   ALTSGPT 8 12   ASTSGOT 10* 23   ALKPHOSPHAT 119* 115*   TBILIRUBIN 0.6 0.5   GLUCOSE 73 79           Recent Labs     " 10/18/23  2050 10/18/23  2326   RBC 4.01* 3.65*   HEMOGLOBIN 11.4* 10.4*   HEMATOCRIT 36.8* 33.2*   PLATELETCT 265 240           Recent Labs     10/18/23  2050 10/18/23  2326   WBC 29.7* 32.9*   NEUTSPOLYS 86.60* 85.50*   LYMPHOCYTES 8.50* 8.60*   MONOCYTES 3.70 3.90   EOSINOPHILS 0.00 0.20   BASOPHILS 0.10 0.20   ASTSGOT 10* 23   ALTSGPT 8 12   ALKPHOSPHAT 119* 115*   TBILIRUBIN 0.6 0.5         Meds:   omeprazole  20 mg      NORepinephrine  0-1 mcg/kg/min (Ideal) 0.1 mcg/kg/min (10/18/23 2359)    linezolid (ZYVOX) IV  600 mg 600 mg (10/19/23 0039)    piperacillin-tazobactam  3.375 g                      Procedures:  N/a     Imaging:  DX-CHEST-PORTABLE (1 VIEW)   Final Result           1.  Pulmonary edema and/or infiltrates are identified, which appear somewhat increased since the prior exam.   2.  Cardiomegaly       CT-EXTREMITY, LOWER WITH LEFT   Final Result           1.  Enhancing fluid collection at the distal soft tissues at below the knee amputation, appearance most compatible with abscess. Appears to communicate with the skin surface.   2.  Suspected abscess abuts the distal tibial stump concerning for changes of osteomyelitis.   3.  Diffuse subcutaneous fat stranding, compatible changes of edema and/or cellulitis.       CT-ABDOMEN-PELVIS WITH   Final Result           1.  4.6 cm mid abdominal hernia containing a portion of transverse without visualized obstructive changes   2.  Bilateral pulmonary infiltrates.   3.  Moderate right and trace left pleural effusion   4.  Diffuse subcutaneous fat stranding, appearance favoring changes of anasarca   5.  Atherosclerosis and atherosclerotic coronary artery disease       DX-TIBIA AND FIBULA LEFT   Final Result           1.  No acute traumatic bony injury.   2.  Soft tissue gas at the distal amputation site anteriorly, appearance suggests open wound and/or underlying infectious etiology. Correlate with exam.       DX-CHEST-PORTABLE (1 VIEW)   Final Result            1.  Pulmonary edema and/or infiltrates.   2.  Trace right pleural effusion   3.  Cardiomegaly             ASSESSEMENT and PLAN:     * Septic shock (HCC)- (present on admission)  Assessment & Plan  This is Sepsis Present on admission  SIRS criteria identified on my evaluation include: Fever, with temperature greater than 100.9 deg F and Leukocytosis, with WBC greater than 12,000  Clinical indicators of end organ dysfunction include Hypotension with systolic blood pressure less than 90 or MAP less than 65 and Toxic Metabolic Encephalopathy  Source is likely osteomyelitis  Sepsis protocol initiated  Crystalloid Fluid Administration: Fluid resuscitation ordered per standard protocol - 30 mL/kg per current or ideal body weight  IV antibiotics as appropriate for source of sepsis  Reassessment: I have reassessed the patient's hemodynamic status     Orthopedics consulted in the ED, surgery plan per Orthopedics  Blood cultures collected  Received IVF in the ED  Patient received vancomycin. Continue Linezolid and Zosyn     Murmur, cardiac  Assessment & Plan  Suspect likely due to known aortic stenosis  However if early blood cultures, possible evaluation for endocarditis     Somnolence  Assessment & Plan  Alcohol, ammonia, TSH, UDS WNL  ABG pending     Rheumatoid arthritis (HCC)- (present on admission)  Assessment & Plan  On Humira and intermittent prednisone  Will hold off on restarting prednisone for now, cortisol level pending  If hemodynamics worsen, will consider stress dose steroids     Chronic diastolic heart failure with preserved ejection fraction (HCC)- (present on admission)  Assessment & Plan  Careful with IVF  Hold home Lasix as patient is in septic shock, anticipate restart of heart failure medications when able     Normocytic anemia- (present on admission)  Assessment & Plan  Chronic, baseline Hb 9-11  Continue to monitor for acute blood loss     Chronic pain syndrome- (present on admission)  Assessment &  Plan  Limit usage of narcotic pain medications  Multimodality pain management as needed     History of cigarette smoking- (present on admission)  Assessment & Plan  Patient reports intermittent tobacco usage, last 1-2 weeks ago  Declined NRT at this time  Further counseling on discharge     MONICA (obstructive sleep apnea)- (present on admission)  Assessment & Plan  Limit sedating medications  ABG pending     Mood disorder (HCC)- (present on admission)  Assessment & Plan  Holding home duloxetine as patient is on linezolid to minimize serotonin syndrome  Anticipate restart after discharge           DISPO: Admit to ICU     CODE STATUS: Full     Quality Measures:  Feeding: N/a  Analgesia: N/a  Sedation: N/a  Thromboprophylaxis: SCDs on RLE, holding chemical for surgery in AM  Head of bed: >30 degrees  Ulcer prophylaxis: PPI  Glycemic control: N/a  Bowel care: bowel regimen  Indwelling lines: PIV  Deescalation of antibiotics: N/a        Rasheed Dent M.D.       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 He has been followed by orthopedics with plan OR 10/25. He presented by EMS after his wife noticed he was AMS. He had 102.2 fever in ER was given sepsis bolus of fluids and required high levels of oxygen with a concerning left lower leg wound at his stump was hot and tender. He underwent ct scan showing abscess, cellulitis and stranding. Orthopedics has been consulted. History is limited by patient due to his acute mental status change but he does awake move all extremities. His wife is at home taking care of there children and is not at bedside. He  will be admitted as full code as he has been in the past.      Exam:Ill appearing sleeping male in red 1 with tachypnea on oxy mask, dry mucous membranes, no meningismus, lungs with tachypnea no retractions, heart with systolic murmur consistent with AS, abdomen with ventral hernia which is soft and easily reducibable, ext with demarcate chronic appearing wound to left lateral stump warm to touch on spot of pus but unable to express any fluid, neurologically he does awaken and follow commands and move extremities and speech clearly but sleepy.       A/P:  Severe sepsis from left lower ext stump infection and abscess and likely osteo. Orthopedic consultation, s/p vanco, continue linezolid and zosyn. If blood cx + recommend repeat echo to rule out endocarditis with + murmur and restarting vancomycin. Follow up cultures and wound data. Carefull volume expansion with heart failure history and use pressors with norepinephrine with map >65. Check viral swab. Check cortisol level with hx of chronic steroids and if develops shock would start stress dose steroids.      Hx of CHF (Mod AS, Mod MR, Pulmonary HTN, EF 55%)  Careful with volume expansion. Restart heart failure regime when stable. Monitor need for force diuresis or Bipap.      Sepsis encephalopathy:  Non focal on exam, check VBG/ABG, ammonia, alcohol, TSH and UDS. Serial monitor need for intubation and provide aspiration precautions.      Leukocytosis: Due to sepsis, serial monitor.      Chronic pain: judicious use of narcotics and multimodal pain regime.      MONICA: avoid central acting drugs, use of cpap, check abg/vbg to rule out CO2 narcosis.      Patient remains in critical condition from severe sepsis and encephalopathy . Critical care time provided was 78 minutes. This excludes all separate billable procedures.      Please see [facility]/NP notes for additional documentation     Maxi Saravia MD  Critical Care Medicine    10/19/2023     Time Called: 11:35pm  Time  Arrived: 6am        HPI: Richard Hubbard II is a 48 y.o. 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  Chronic complains of pain at his left BKA site.  He says this has been worsening over the past 2 days.  Yesterday he began feeling very sick so presented to the emergency department.     Past Medical History        Past Medical History:   Diagnosis Date    ADD (attention deficit disorder)      Alcohol abuse      Allergic rhinitis 5/21/2009    Anemia 09/2019    Anxiety 5/21/2009    Apnea, sleep      Arrhythmia       afib when hospitaized for infection    Back pain 5/21/2009    Bipolar disorder (HCC)      Bleeding ulcer 5/21/2009    Bleeding ulcer 5/21/2009     History of anemia-now on nexium     Bowel habit changes       constipation related to narcotics    Daytime sleepiness      Depression 5/21/2009    Eczema 5/21/2009    GERD (gastroesophageal reflux disease)      Heart burn       on meds    Heart murmur       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    Hemorrhagic disorder (HCC)       hx of bleeding ulcers    History of bleeding ulcers 2/12/2015    Hypertension 05/03/2021     pt states well controlled on meds    Insomnia 5/21/2009    Migraine 5/21/2009    Morning headache      Obesity, morbid (HCC) 5/21/2009    Osteomyelitis ([facility]) 10/7/2019    Pain 09/2019     Chronic knee and back pain    Pubic ramus fracture ([facility]) 9/28/2019    Rheumatoid arteritis ([facility]) 09/2019     knee, feet right hand    Sleep apnea 5/21/2009     Did not tolerate cpap, does not use it    Snoring              Past Surgical History         Past Surgical History:   Procedure Laterality Date    KNEE AMPUTATION BELOW Left 3/9/2023     Procedure: AMPUTATION, BELOW KNEE;  Surgeon: Wayne Chambers M.D.;  Location: SURGERY [facility];  Service: Orthopedics    PB TOTAL KNEE ARTHROPLASTY Right 7/22/2020     Procedure: ARTHROPLASTY, KNEE, TOTAL;  Surgeon: Temo Pires M.D.;  Location: SURGERY [facility];  Service:  Orthopedics    TENDON TRANSFER Right 1/22/2020     Procedure: RIGHT DISTAL ULNA RESECTION AND EXTENSOR TENDON TRANSFER;  Surgeon: Marine Rushing M.D.;  Location: SURGERY [facility];  Service: Orthopedics    OTHER ORTHOPEDIC SURGERY Right 2020     total right knee replacement    IRRIGATION & DEBRIDEMENT ORTHO Left 10/9/2019     Procedure: IRRIGATION AND DEBRIDEMENT, WOUND - PELVIC OSTEOMYELITIS;  Surgeon: You Olivares M.D.;  Location: SURGERY [facility];  Service: Orthopedics    OTHER SURGICAL PROCEDURE   2019     osteomelitis of pelvis cleaned    GASTRIC BYPASS LAPAROSCOPIC   2000     Lucila en y    CHOLECYSTECTOMY   2000    OTHER SURGICAL PROCEDURE         repair of broken penis            Medications  No current facility-administered medications on file prior to encounter.             Current Outpatient Medications on File Prior to Encounter   Medication Sig Dispense Refill    furosemide (LASIX) 20 MG Tab Take 1 Tablet by mouth 2 times a day as needed (water retention). Take 1 tablet daily and may take additional 1 tablet as need for increase in weight by 3 lb/ water retention. 180 Tablet 3    predniSONE (DELTASONE) 10 MG Tab Take 1 Tablet by mouth 2 times a day. 180 Tablet 3    zolpidem (AMBIEN) 5 MG Tab Take 1 Tablet by mouth at bedtime as needed for Sleep for up to 30 days. 30 Tablet 0    MOVANTIK 25 MG Tab          Adalimumab (HUMIRA) 40 MG/0.4ML Prefilled Syringe Kit Inject 40 mg under the skin every 14 days.        ferrous sulfate 325 (65 Fe) MG tablet Take 325 mg by mouth 2 times a day.        docusate sodium (COLACE) 100 MG Cap Take 200 mg by mouth 2 times a day.        multivitamin Tab Take 1 Tablet by mouth every day.        dapagliflozin propanediol (FARXIGA) 10 MG Tab Take 1 Tablet by mouth every day for 90 days. 30 Tablet 2    DULoxetine (CYMBALTA) 60 MG Cap DR Particles delayed-release capsule Take 1 Capsule by mouth every day. 30 Capsule 2    potassium chloride SA (K-DUR) 10 MEQ  Tab CR Take 1 Tablet by mouth every day for 90 days. 30 Each 2    vitamin D3 (CHOLECALCIFEROL) 1000 Unit (25 mcg) Tab Take 1 Tablet by mouth every day. 30 Tablet 2    acetaminophen (TYLENOL) 500 MG Tab Take 2 Tablets by mouth in the morning, at noon, and at bedtime. 180 Tablet 2    lidocaine (LIDODERM) 5 % Patch Place 2 Patches on the skin every 24 hours. 10 Patch 2    melatonin 3 MG Tab Take 1 Tablet by mouth at bedtime. 30 Tablet 2    omeprazole (PRILOSEC) 20 MG delayed-release capsule Take 1 Capsule by mouth 2 times a day. 60 Capsule 2    Adalimumab (HUMIRA PEN) 40 MG/0.4ML Pen-injector Kit INJECT 40MG SUBCUTANEOUSLY  EVERY 2 WEEKS 2 Each 4    albuterol 108 (90 Base) MCG/ACT Aero Soln inhalation aerosol Inhale 2 Puffs every 6 hours as needed for Shortness of Breath. 8.5 g 0    Naloxone (NARCAN) 4 MG/0.1ML Liquid One spray in one nostril for overdose and call 911. 1 Package 0         Allergies  Nsaids, Diphenhydramine, Mirtazapine, Penicillins, and Promethazine     ROS  Negative except as indicated in the HPI     Family History         Family History   Problem Relation Age of Onset    Hypertension Mother      Arthritis Mother      Depression Mother      Cancer Father           bladder    Schizophrenia Father      Cancer Maternal Grandmother           breast    Heart Disease Maternal Grandmother      Psychiatric Illness Other      Stroke Maternal Aunt      Other Neg Hx           no connective tissue disorders or known aortic disease            Social History               Socioeconomic History    Marital status:     Highest education level: Bachelor's degree (e.g., BA, AB, BS)   Occupational History    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   Tobacco Use    Smoking status: Some Days       Current packs/day: 0.00       Types: Cigarettes, Cigars       Last attempt to quit: 2023       Years since quittin.7       Passive exposure: Current    Smokeless tobacco: Never    Tobacco comments:       occasional cigar    Vaping Use    Vaping Use: Never used   Substance and Sexual Activity    Alcohol use: No       Alcohol/week: 0.0 oz       Comment: quit 2011 ago- past ETOH abuse    Drug use: No    Sexual activity: Yes       Partners: Female       Comment: ;    Other Topics Concern     Service No    Blood Transfusions Yes    Caffeine Concern No    Occupational Exposure No    Hobby Hazards No    Sleep Concern No    Stress Concern Yes    Weight Concern Yes    Special Diet No    Back Care No    Exercise No    Bike Helmet No    Seat Belt Yes    Self-Exams Yes   Social History Narrative     , lives in [place]      Social Determinants of Health           Financial Resource Strain: Low Risk  (9/7/2023)     Overall Financial Resource Strain (CARDIA)      Difficulty of Paying Living Expenses: Not very hard   Food Insecurity: No Food Insecurity (9/7/2023)     Hunger Vital Sign      Worried About Running Out of Food in the Last Year: Never true      Ran Out of Food in the Last Year: Never true   Transportation Needs: Unmet Transportation Needs (9/7/2023)     PRAPARE - Transportation      Lack of Transportation (Medical): Yes      Lack of Transportation (Non-Medical): Yes   Physical Activity: Sufficiently Active (9/7/2023)     Exercise Vital Sign      Days of Exercise per Week: 7 days      Minutes of Exercise per Session: 30 min   Stress: Stress Concern Present (9/7/2023)     [employer] - Occupational Stress Questionnaire      Feeling of Stress : Rather much   Social Connections: Socially Integrated (9/7/2023)     Social Connection and Isolation Panel [NHANES]      Frequency of Communication with Friends and Family: More than three times a week      Frequency of Social Gatherings with Friends and Family: Twice a week      Attends Spiritism Services: More than 4 times per year      Active Member of Clubs or Organizations: Yes      Attends Club or Organization Meetings: More than 4 times per  "year      Marital Status:    Intimate Partner Violence: Not At Risk (9/7/2023)     Humiliation, Afraid, Rape, and Kick questionnaire      Fear of Current or Ex-Partner: No      Emotionally Abused: No      Physically Abused: No      Sexually Abused: No   Housing Stability: Low Risk  (9/7/2023)     Housing Stability Vital Sign      Unable to Pay for Housing in the Last Year: No      Number of Places Lived in the Last Year: 1      Unstable Housing in the Last Year: No            Physical Exam  Vitals  BP 99/55   Pulse 79   Temp 35.9 °C (96.7 °F) (Temporal)   Resp 18   Ht 1.778 m (5' 10\")   Wt 104 kg (229 lb 15 oz)   SpO2 99%   General: Well Developed, Well Nourished, Age appropriate appearance  HEENT: Normocephalic, atraumatic  Psych: Normal mood and affect  Neck: Supple, nontender, no masses   Lungs: Breathing unlabored, No audible wheezing  Heart: Regular rate  Abdomen: ND  MSK:   On inspection of the left lower extremity his residual limb is perfused.  There is erythema and a open granulating area over the lateral aspect of his incision.  Tender to palpation throughout this area.  No obvious fluctuance.        Radiographs:        DX-CHEST-PORTABLE (1 VIEW)   Final Result           1.  Pulmonary edema and/or infiltrates are identified, which appear somewhat increased since the prior exam.   2.  Cardiomegaly       CT-EXTREMITY, LOWER WITH LEFT   Final Result           1.  Enhancing fluid collection at the distal soft tissues at below the knee amputation, appearance most compatible with abscess. Appears to communicate with the skin surface.   2.  Suspected abscess abuts the distal tibial stump concerning for changes of osteomyelitis.   3.  Diffuse subcutaneous fat stranding, compatible changes of edema and/or cellulitis.       CT-ABDOMEN-PELVIS WITH   Final Result           1.  4.6 cm mid abdominal hernia containing a portion of transverse without visualized obstructive changes   2.  Bilateral pulmonary " infiltrates.   3.  Moderate right and trace left pleural effusion   4.  Diffuse subcutaneous fat stranding, appearance favoring changes of anasarca   5.  Atherosclerosis and atherosclerotic coronary artery disease       DX-TIBIA AND FIBULA LEFT   Final Result           1.  No acute traumatic bony injury.   2.  Soft tissue gas at the distal amputation site anteriorly, appearance suggests open wound and/or underlying infectious etiology. Correlate with exam.       DX-CHEST-PORTABLE (1 VIEW)   Final Result           1.  Pulmonary edema and/or infiltrates.   2.  Trace right pleural effusion   3.  Cardiomegaly             Laboratory Values        Recent Labs     10/18/23  2326 10/19/23  0130 10/19/23  0308   WBC 32.9* 34.9* 36.2*   RBC 3.65* 3.65* 3.77*   HEMOGLOBIN 10.4* 10.4* 10.7*   HEMATOCRIT 33.2* 33.2* 34.9*   MCV 91.0 91.0 92.6   MCH 28.5 28.5 28.4   MCHC 31.3* 31.3* 30.7*   RDW 58.5* 58.4* 59.7*   PLATELETCT 240 249 258   MPV 9.4 9.6 9.2           Recent Labs     10/18/23  2050 10/18/23  2326   SODIUM 138 134*   POTASSIUM 4.2 3.9   CHLORIDE 100 100   CO2 27 26   GLUCOSE 73 79   BUN 17 19          Recent Labs     10/19/23  0130   INR 1.25*            Assessment: 48-year-old male with an abscess at his left BKA site.     Plan: We discussed the diagnosis and findings with the patient at length.  We reviewed possible non operative and operative interventions and the risks and benefits of each of these.  he had a chance to ask questions and all of these were answered to his satisfaction. The patient chose to proceed with operative intervention to include incision and drainage of his left BKA abscess with wound VAC placement. Risks and benefits of surgery were discussed which include but are not limited to bleeding, infection, neurovascular damage, malunion, nonunion, instability, limb length discrepancy, DVT, PE, MI, Stroke and death. They understand these risks and wish to proceed.       N.p.o. for OR        Jay  MD Jyothi  Orthopedic Trauma      Hospital Medicine Consultation     Date of Service  10/20/2023     Referring Physician  Richard Gonzalez M.D.     Consulting Physician  Carlos Eduardo Andrade M.D.     Reason for Consultation  sepsis     History of Presenting Illness  48 y.o. male who presented 10/18/2023 with fever and infected lower extremity stump.  Mr. Hubbard has a past medical history of rheumatoid arthritis and recent below the knee amputation that was most recently admitted to [facility] then went to inpatient rehab 8/20/2023 through 9/5/2023 after work-up of congestive heart failure with preserved ejection fraction.  10/18/2023 he scented to the emergency room with altered level of consciousness and lethargy.  In the emergency room he was found to have a white blood cell count of 30,000 and a CT lower extremity revealed a enhancing fluid collection at the distal soft tissues at the below the knee amputation consistent with infection and osteomyelitis.  He was admitted in guarded condition to the intensive care unit for IV fluids, broad-spectrum IV antibiotics and management of septic shock.  On 10/19/2023 he underwent left below the knee amputation incision and drainage of an abscess with wound VAC placement.  Blood cultures are positive for MSSA x2.     Review of Systems  Review of Systems   Constitutional:  Negative for chills and fever.   Respiratory:  Negative for shortness of breath.    Gastrointestinal:         Tolerating a diet         Past Medical History   has a past medical history of ADD (attention deficit disorder), Alcohol abuse, Allergic rhinitis (5/21/2009), Anemia (09/2019), Anxiety (5/21/2009), Apnea, sleep, Arrhythmia, Back pain (5/21/2009), Bipolar disorder ([facility]), Bleeding ulcer (5/21/2009), Bleeding ulcer (5/21/2009), Bowel habit changes, Daytime sleepiness, Depression (5/21/2009), Eczema (5/21/2009), GERD (gastroesophageal reflux disease), Heart burn, Heart murmur, Hemorrhagic disorder  ([facility]), History of bleeding ulcers (2/12/2015), Hypertension (05/03/2021), Insomnia (5/21/2009), Migraine (5/21/2009), Morning headache, Obesity, morbid ([facility]) (5/21/2009), Osteomyelitis ([facility]) (10/7/2019), Pain (09/2019), Pubic ramus fracture ([facility]) (9/28/2019), Rheumatoid arteritis ([facility]) (09/2019), Sleep apnea (5/21/2009), and Snoring.     He has no past medical history of Hyperlipidemia.     Surgical History   has a past surgical history that includes gastric bypass laparoscopic (2000); irrigation & debridement ortho (Left, 10/9/2019); other surgical procedure; other surgical procedure (2019); pr total knee arthroplasty (Right, 7/22/2020); cholecystectomy (2000); tendon transfer (Right, 1/22/2020); other orthopedic surgery (Right, 2020); knee amputation below (Left, 3/9/2023); and irrigation & debridement general (Left, 10/19/2023).     Family History  family history includes Arthritis in his mother; Cancer in his father and maternal grandmother; Depression in his mother; Heart Disease in his maternal grandmother; Hypertension in his mother; Psychiatric Illness in an other family member; Schizophrenia in his father; Stroke in his maternal aunt.     Social History   reports that he has been smoking cigarettes and cigars. He has been exposed to tobacco smoke. He has never used smokeless tobacco. He reports that he does not drink alcohol and does not use drugs.     Medications  Prior to Admission Medications   Prescriptions Last Dose Informant Patient Reported? Taking?   Adalimumab (HUMIRA PEN) 40 MG/0.4ML Pen-injector Kit 2 WEEKS AGO at [facility] Patient No No   Sig: INJECT 40MG SUBCUTANEOUSLY  EVERY 2 WEEKS   DULoxetine (CYMBALTA) 60 MG Cap DR Particles delayed-release capsule 2 DAYS AGO at [facility] Patient No No   Sig: Take 1 Capsule by mouth every day.   MOVANTIK 25 MG Tab 2 DAYS AGO at [facility] Patient Yes No   Sig: Take 25 mg by mouth every day.   Naloxone (NARCAN) 4 MG/0.1ML Liquid PRN at [facility] Patient No No   Sig: One spray in  one nostril for overdose and call 911.   acetaminophen (TYLENOL) 500 MG Tab 2 DAYS AGO at [facility] Patient No No   Sig: Take 2 Tablets by mouth in the morning, at noon, and at bedtime.   albuterol 108 (90 Base) MCG/ACT Aero Soln inhalation aerosol >MONTH at [facility] Patient No No   Sig: Inhale 2 Puffs every 6 hours as needed for Shortness of Breath.   dapagliflozin propanediol (FARXIGA) 10 MG Tab 2 DAYS AGO at [facility] Patient No No   Sig: Take 1 Tablet by mouth every day for 90 days.   docusate sodium (COLACE) 100 MG Cap 2 DAYS AGO at [facility] Patient Yes No   Sig: Take 200 mg by mouth every day.   furosemide (LASIX) 20 MG Tab 2 DAYS AGO at [facility] Patient No No   Sig: Take 1 Tablet by mouth 2 times a day as needed (water retention). Take 1 tablet daily and may take additional 1 tablet as need for increase in weight by 3 lb/ water retention.   lidocaine (LIDODERM) 5 % Patch COUPLE WEEKS AGO at [facility] Patient No No   Sig: Place 2 Patches on the skin every 24 hours.   morphine (MS IR) 30 MG tablet 2 DAYS AGO at [facility] Patient Yes No   Sig: Take 30 mg by mouth 4 times a day.   morphine ER (MS CONTIN) 15 MG Tab CR tablet 2 DAYS AGO at [facility] Patient Yes No   Sig: Take 15 mg by mouth 2 times a day.   multivitamin Tab 2 DAYS AGO at [facility] Patient Yes No   Sig: Take 1 Tablet by mouth every day.   omeprazole (PRILOSEC) 20 MG delayed-release capsule 2 DAYS AGO at [facility] Patient Yes Yes   Sig: Take 20 mg by mouth every day.   potassium chloride SA (K-DUR) 10 MEQ Tab CR 2 DAYS AGO at [facility] Patient No No   Sig: Take 1 Tablet by mouth every day for 90 days.   predniSONE (DELTASONE) 10 MG Tab 2 DAYS AGO at [facility] Patient No No   Sig: Take 1 Tablet by mouth 2 times a day.   pregabalin (LYRICA) 150 MG Cap 2 DAYS AGO at [facility] Patient Yes Yes   Sig: Take 150 mg by mouth in the morning, at noon, and at bedtime.   vitamin D3 (CHOLECALCIFEROL) 1000 Unit (25 mcg) Tab 2 DAYS AGO at [facility] Patient No No   Sig: Take 1 Tablet by mouth every day.   zolpidem (AMBIEN) 5 MG Tab 2 DAYS AGO at  "[facility] Patient No No   Sig: Take 1 Tablet by mouth at bedtime as needed for Sleep for up to 30 days.      Facility-Administered Medications: None         Allergies        Allergies   Allergen Reactions    Nsaids         Bleeding, \"I had a bleeding ulcer\"    Diphenhydramine Unspecified       \"I get agitated\"    Mirtazapine Unspecified       I had a hard time waking up, lacking mental focus.     Penicillins         \"Had some dizziness\"  Tolerated Unasyn 10/2019  Tolerated Zosyn 02/2023    Promethazine Unspecified       \"I get very agitated\"         Physical Exam  Pulse:  [76-99] 85  Resp:  [9-40] 16  BP: ()/(50-77) 112/58  SpO2:  [94 %-100 %] 97 %     Physical Exam  Vitals and nursing note reviewed.   Constitutional:       Appearance: He is ill-appearing.   HENT:      Mouth/Throat:      Mouth: Mucous membranes are dry.   Neck:      Comments: Right IJ central line  Cardiovascular:      Rate and Rhythm: Normal rate and regular rhythm.      Heart sounds: Murmur heard.   Pulmonary:      Effort: Pulmonary effort is normal.      Breath sounds: Normal breath sounds.   Abdominal:      General: There is no distension.      Tenderness: There is no abdominal tenderness.      Comments: Ventral hernia   Genitourinary:     Comments: piedra  Musculoskeletal:      Comments: Right Charcot foot  Left BKA covered     Neurological:      General: No focal deficit present.      Mental Status: He is alert and oriented to person, place, and time.   Psychiatric:         Mood and Affect: Mood normal.         Behavior: Behavior normal.            Fluids         Date 10/20/23 0700 - 10/21/23 0659   Shift 6013-3076 3149-0765 6353-9610 24 Hour Total   INTAKE   I.V. 18.7     18.7   Shift Total 18.7     18.7   OUTPUT   Urine 120     120   Shift Total 120     120   Weight (kg) 104.3 104.3 104.3 104.3         Laboratory        Recent Labs     10/19/23  0130 10/19/23  0308 10/20/23  0326   WBC 34.9* 36.2* 21.6*   RBC 3.65* 3.77* 3.59*   HEMOGLOBIN " 10.4* 10.7* 10.3*   HEMATOCRIT 33.2* 34.9* 33.0*   MCV 91.0 92.6 91.9   MCH 28.5 28.4 28.7   MCHC 31.3* 30.7* 31.2*   RDW 58.4* 59.7* 58.1*   PLATELETCT 249 258 212   MPV 9.6 9.2 9.3            Recent Labs     10/18/23  2050 10/18/23  2326 10/20/23  0326   SODIUM 138 134* 141   POTASSIUM 4.2 3.9 4.0   CHLORIDE 100 100 106   CO2 27 26 27   GLUCOSE 73 79 156*   BUN 17 19 19   CREATININE 0.96 0.93 0.81   CALCIUM 8.2* 7.5* 7.7*          Recent Labs     10/19/23  0130   INR 1.25*                  Imaging  DX-CHEST-PORTABLE (1 VIEW)   Final Result           1.  Pulmonary edema and/or infiltrates are identified, which appear somewhat increased since the prior exam.   2.  Cardiomegaly       CT-EXTREMITY, LOWER WITH LEFT   Final Result           1.  Enhancing fluid collection at the distal soft tissues at below the knee amputation, appearance most compatible with abscess. Appears to communicate with the skin surface.   2.  Suspected abscess abuts the distal tibial stump concerning for changes of osteomyelitis.   3.  Diffuse subcutaneous fat stranding, compatible changes of edema and/or cellulitis.       CT-ABDOMEN-PELVIS WITH   Final Result           1.  4.6 cm mid abdominal hernia containing a portion of transverse without visualized obstructive changes   2.  Bilateral pulmonary infiltrates.   3.  Moderate right and trace left pleural effusion   4.  Diffuse subcutaneous fat stranding, appearance favoring changes of anasarca   5.  Atherosclerosis and atherosclerotic coronary artery disease       DX-TIBIA AND FIBULA LEFT   Final Result           1.  No acute traumatic bony injury.   2.  Soft tissue gas at the distal amputation site anteriorly, appearance suggests open wound and/or underlying infectious etiology. Correlate with exam.       DX-CHEST-PORTABLE (1 VIEW)   Final Result           1.  Pulmonary edema and/or infiltrates.   2.  Trace right pleural effusion   3.  Cardiomegaly       EC-ECHOCARDIOGRAM COMPLETE W/ CONT     (Results Pending)         Assessment/Plan  * Septic shock (HCC)- (present on admission)  Assessment & Plan  This is Septic shock Present on admission  SIRS criteria identified on my evaluation include: Leukocytosis, with WBC greater than 12,000  Clinical indicators of end organ dysfunction include Lactic Acid greater than 2  Indicators of septic shock include: Sepsis present and initial lactate level result is greater than or equal to 4mmol/L he required intravenous pressors  Sources is: Staph aureus bacteremia  Sepsis protocol initiated  Crystalloid Fluid Administration: Was given  IV antibiotics as appropriate for source of sepsis  Reassessment: I have reassessed the patient's hemodynamic status  Tapering IV hydrocortisone     Abscess of left lower extremity- (present on admission)  Assessment & Plan  Status post debridement on 10/19 and he will require further debridement by ortho.     Bacteremia due to Staphylococcus aureus- (present on admission)  Assessment & Plan  MSSA bacteremia with blood cultures x2 positive  Echocardiogram has been ordered  Infectious disease has been consulted     Acquired planovalgus deformity of right foot- (present on admission)  Assessment & Plan  Charcot foot on the right side     Chronic diastolic heart failure with preserved ejection fraction (HCC)- (present on admission)  Assessment & Plan  Monitor for volume overload  He had been on lasix as an outpatient     Chronic pain syndrome- (present on admission)  Assessment & Plan  He is followed by [facility].  He is on morphine immediate release 30 mg 4 times a day and morphine sustained release 15 mg twice daily which will be restarted.  Continue Lyrica and Cymbalta.  Start oxycodone for moderate breakthrough pain and IV Dilaudid for severe breakthrough pain.     Rheumatoid arthritis (HCC)- (present on admission)  Assessment & Plan  He has been on Humira in the past         Consults  INFECTIOUS DISEASES INPATIENT  CONSULT NOTE      Date of Service: 10/20/2023     Consult Requested By: Carlos Eduardo Andrade M.D.     Reason for Consultation: MSSA bacteremia and BKA stump infection     History of Present Illness:   Richard Hubbard II is a 48 y.o.  admitted 10/18/2023. Pt has a past medical history of rheumatoid arthritis with rheumatoid nodules on Humira per notes.  Patient also with history of chronic bilateral foot ulcers ongoing since 2022.  He was last seen by ID in 3/23 for group A strep bacteremia at leg tenosynovitis with left foot osteomyelitis.  He underwent left BKA on 3/9/2023.  He presented complaining of altered mentation and fevers.     Hospital Course:   Febrile to 102.2 on arrival, leukocytosis on arrival and ongoing.  CT abdomen pelvis relatively unremarkable but lower lung bases with infiltrates.  CT leg with enhancing fluid collection below-knee amputation, compatible with abscess.  Abscess abuts the distal tibial stump concerning for changes of osteomyelitis.     Review Of Systems:  Review of Systems   Constitutional:  Positive for malaise/fatigue. Negative for chills and fever.   HENT:  Negative for hearing loss.    Eyes:  Negative for blurred vision and double vision.   Respiratory:  Negative for cough, sputum production and shortness of breath.    Cardiovascular:  Negative for chest pain and leg swelling.   Gastrointestinal:  Negative for abdominal pain, constipation, diarrhea, nausea and vomiting.   Genitourinary:  Negative for dysuria.   Musculoskeletal:  Positive for back pain, myalgias and neck pain.   Skin:  Negative for rash.   Neurological:  Negative for weakness.   Psychiatric/Behavioral:  The patient is not nervous/anxious.          PMH:   Past Medical History        Past Medical History:   Diagnosis Date    ADD (attention deficit disorder)      Alcohol abuse      Allergic rhinitis 5/21/2009    Anemia 09/2019    Anxiety 5/21/2009    Apnea, sleep      Arrhythmia       afib when hospitaized for  infection    Back pain 5/21/2009    Bipolar disorder ([facility])      Bleeding ulcer 5/21/2009    Bleeding ulcer 5/21/2009     History of anemia-now on nexium     Bowel habit changes       constipation related to narcotics    Daytime sleepiness      Depression 5/21/2009    Eczema 5/21/2009    GERD (gastroesophageal reflux disease)      Heart burn       on meds    Heart murmur       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    Hemorrhagic disorder (HCC)       hx of bleeding ulcers    History of bleeding ulcers 2/12/2015    Hypertension 05/03/2021     pt states well controlled on meds    Insomnia 5/21/2009    Migraine 5/21/2009    Morning headache      Obesity, morbid ([facility]) 5/21/2009    Osteomyelitis ([facility]) 10/7/2019    Pain 09/2019     Chronic knee and back pain    Pubic ramus fracture ([facility]) 9/28/2019    Rheumatoid arteritis ([facility]) 09/2019     knee, feet right hand    Sleep apnea 5/21/2009     Did not tolerate cpap, does not use it    Snoring              PSH:  Past Surgical History         Past Surgical History:   Procedure Laterality Date    IRRIGATION & DEBRIDEMENT GENERAL Left 10/19/2023     Procedure: IRRIGATION AND DEBRIDEMENT, WOUND;  Surgeon: Jay Roe M.D.;  Location: [facility];  Service: Trauma    KNEE AMPUTATION BELOW Left 3/9/2023     Procedure: AMPUTATION, BELOW KNEE;  Surgeon: Wayne Chambers M.D.;  Location: [facility];  Service: Orthopedics    PB TOTAL KNEE ARTHROPLASTY Right 7/22/2020     Procedure: ARTHROPLASTY, KNEE, TOTAL;  Surgeon: Temo Pires M.D.;  Location: [facility];  Service: Orthopedics    TENDON TRANSFER Right 1/22/2020     Procedure: RIGHT DISTAL ULNA RESECTION AND EXTENSOR TENDON TRANSFER;  Surgeon: Marine Rushing M.D.;  Location: [facility];  Service: Orthopedics    OTHER ORTHOPEDIC SURGERY Right 2020     total right knee replacement    IRRIGATION & DEBRIDEMENT ORTHO Left 10/9/2019     Procedure: IRRIGATION AND DEBRIDEMENT, WOUND -  PELVIC OSTEOMYELITIS;  Surgeon: You Olivares M.D.;  Location: [facility];  Service: Orthopedics    OTHER SURGICAL PROCEDURE   2019     osteomelitis of pelvis cleaned    GASTRIC BYPASS LAPAROSCOPIC        Lucila en y    CHOLECYSTECTOMY       OTHER SURGICAL PROCEDURE         repair of broken penis            FAMILY HX:  Family History         Family History   Problem Relation Age of Onset    Hypertension Mother      Arthritis Mother      Depression Mother      Cancer Father           bladder    Schizophrenia Father      Cancer Maternal Grandmother           breast    Heart Disease Maternal Grandmother      Psychiatric Illness Other      Stroke Maternal Aunt      Other Neg Hx           no connective tissue disorders or known aortic disease         Reviewed family history. No pertinent family history.      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   Tobacco Use    Smoking status: Some Days       Current packs/day: 0.00       Types: Cigarettes, Cigars       Last attempt to quit: 2023       Years since quittin.7       Passive exposure: Current    Smokeless tobacco: Never    Tobacco comments:       occasional cigar   Vaping Use    Vaping Use: Never used   Substance and Sexual Activity    Alcohol use: No       Alcohol/week: 0.0 oz       Comment: quit 2011- past ETOH abuse    Drug use: No    Sexual activity: Yes       Partners: Female       Comment: ;    Other Topics Concern     Service No    Blood Transfusions Yes    Caffeine Concern No    Occupational Exposure No    Hobby Hazards No    Sleep Concern No    Stress Concern Yes    Weight Concern Yes    Special Diet No    Back Care No    Exercise No    Bike Helmet  No    Seat Belt Yes    Self-Exams Yes   Social History Narrative     , lives in [place]      Social Determinants of Health           Financial Resource Strain: Low Risk  (9/7/2023)     Overall Financial Resource Strain (CARDIA)      Difficulty of Paying Living Expenses: Not very hard   Food Insecurity: No Food Insecurity (9/7/2023)     Hunger Vital Sign      Worried About Running Out of Food in the Last Year: Never true      Ran Out of Food in the Last Year: Never true   Transportation Needs: Unmet Transportation Needs (9/7/2023)     PRAPARE - Transportation      Lack of Transportation (Medical): Yes      Lack of Transportation (Non-Medical): Yes   Physical Activity: Sufficiently Active (9/7/2023)     Exercise Vital Sign      Days of Exercise per Week: 7 days      Minutes of Exercise per Session: 30 min   Stress: Stress Concern Present (9/7/2023)     [employer] - Occupational Stress Questionnaire      Feeling of Stress : Rather much   Social Connections: Socially Integrated (9/7/2023)     Social Connection and Isolation Panel [NHANES]      Frequency of Communication with Friends and Family: More than three times a week      Frequency of Social Gatherings with Friends and Family: Twice a week      Attends Hinduism Services: More than 4 times per year      Active Member of Clubs or Organizations: Yes      Attends Club or Organization Meetings: More than 4 times per year      Marital Status:    Intimate Partner Violence: Not At Risk (9/7/2023)     Humiliation, Afraid, Rape, and Kick questionnaire      Fear of Current or Ex-Partner: No      Emotionally Abused: No      Physically Abused: No      Sexually Abused: No   Housing Stability: Low Risk  (9/7/2023)     Housing Stability Vital Sign      Unable to Pay for Housing in the Last Year: No      Number of Places Lived in the Last Year: 1      Unstable Housing in the Last Year: No         Social History           Tobacco Use  "  Smoking Status Some Days    Current packs/day: 0.00    Types: Cigarettes, Cigars    Last attempt to quit: 2023    Years since quittin.7    Passive exposure: Current   Smokeless Tobacco Never   Tobacco Comments     occasional cigar      Social History           Substance and Sexual Activity   Alcohol Use No    Alcohol/week: 0.0 oz     Comment: quit  ago- past ETOH abuse         Allergies/Intolerances:        Allergies   Allergen Reactions    Nsaids         Bleeding, \"I had a bleeding ulcer\"    Diphenhydramine Unspecified       \"I get agitated\"    Mirtazapine Unspecified       I had a hard time waking up, lacking mental focus.     Penicillins         \"Had some dizziness\"  Tolerated Unasyn 10/2019  Tolerated Zosyn 2023    Promethazine Unspecified       \"I get very agitated\"         History reviewed with the patient and /or family member, chart & primary care team     Other Current Medications:     Current Facility-Administered Medications:     vancomycin (Vancocin) 1,500 mg in  mL IVPB, 1,500 mg, Intravenous, Q12HR, Richard Gonzalez M.D., Last Rate: 125 mL/hr at 10/20/23 1306, 1,500 mg at 10/20/23 1306    morphine (MS IR) tablet 30 mg, 30 mg, Oral, Q6HRS, Estefanía Cuevas, A.P.R.N., 30 mg at 10/20/23 1206    hydrocortisone sodium succinate PF (Solu-CORTEF) 100 MG injection 50 mg, 50 mg, Intravenous, Q12HRS **FOLLOWED BY** [START ON 10/22/2023] hydrocortisone sodium succinate PF (Solu-CORTEF) 100 MG injection 50 mg, 50 mg, Intravenous, DAILY, Estefanía Cuevas A.P.R.N.    zolpidem (Ambien) tablet 5 mg, 5 mg, Oral, HS PRN, Estefanía Cuevas A.P.R.N.    omeprazole (PriLOSEC) capsule 20 mg, 20 mg, Oral, BID, Rasheed Dent M.D., 20 mg at 10/20/23 0612    senna-docusate (Pericolace Or Senokot S) 8.6-50 MG per tablet 2 Tablet, 2 Tablet, Oral, BID, 2 Tablet at 10/20/23 0612 **AND** polyethylene glycol/lytes (Miralax) PACKET 1 Packet, 1 Packet, Oral, QDAY PRN **AND** magnesium hydroxide (Milk Of Magnesia) " "suspension 30 mL, 30 mL, Oral, QDAY PRN **AND** bisacodyl (Dulcolax) suppository 10 mg, 10 mg, Rectal, QDAY PRN, Estefanía Cuevas A.P.R.N.    acetaminophen (Tylenol) tablet 650 mg, 650 mg, Oral, Q4HRS PRN, Estefanía Cuevas A.P.R.N.    HYDROmorphone (Dilaudid) injection 1 mg, 1 mg, Intravenous, Q2HRS PRN, Estefanía Cuevas A.P.R.N., 1 mg at 10/20/23 1301    MD Alert...ICU Electrolyte Replacement per Pharmacy, , Other, PHARMACY TO DOSE, JESSICA Foss.P.R.STEPHEN.    morphine ER (Ms Contin) tablet 15 mg, 15 mg, Oral, Q12HRS, Estefanía Cuevas A.P.R.N., 15 mg at 10/20/23 0612    pregabalin (Lyrica) capsule 150 mg, 150 mg, Oral, TID, Estefanía Cuevas, A.P.R.N., 150 mg at 10/20/23 1435    DULoxetine (Cymbalta) capsule 60 mg, 60 mg, Oral, DAILY, Estefanía Cuevas, A.P.R.N., 60 mg at 10/20/23 0611    MD Alert...Vancomycin per Pharmacy, , Other, PHARMACY TO DOSE, Roxanna Mcintyre  [unfilled]     Most Recent Vital Signs:  /57   Pulse 83   Temp 36.6 °C (97.9 °F)   Resp 17   Ht 1.778 m (5' 10\")   Wt 104 kg (229 lb 15 oz)   SpO2 96%   BMI 32.99 kg/m²   Temp  Av.4 °C (99.3 °F)  Min: 35.9 °C (96.7 °F)  Max: 39 °C (102.2 °F)     Physical Exam:  Physical Exam  Constitutional:       Appearance: Normal appearance.   HENT:      Head: Normocephalic and atraumatic.      Right Ear: External ear normal.      Left Ear: External ear normal.      Nose: Nose normal.      Mouth/Throat:      Mouth: Mucous membranes are moist.      Pharynx: Oropharynx is clear.   Eyes:      Extraocular Movements: Extraocular movements intact.      Conjunctiva/sclera: Conjunctivae normal.      Pupils: Pupils are equal, round, and reactive to light.   Cardiovascular:      Rate and Rhythm: Normal rate and regular rhythm.      Heart sounds: Murmur heard.   Pulmonary:      Effort: Pulmonary effort is normal.      Comments: Crackles   Abdominal:      General: Abdomen is flat. Bowel sounds are normal.      Palpations: Abdomen is soft.   Musculoskeletal:     "  Cervical back: Normal range of motion and neck supple.      Comments: Right BKA site and surgical dressings, clean, dry and intact   Skin:     General: Skin is warm and dry.   Neurological:      General: No focal deficit present.      Mental Status: He is alert and oriented to person, place, and time.   Psychiatric:         Mood and Affect: Mood normal.         Behavior: Behavior normal.               Pertinent Lab Results:        Recent Labs     10/19/23  0130 10/19/23  0308 10/20/23  0326   WBC 34.9* 36.2* 21.6*            Recent Labs     10/19/23  0130 10/19/23  0308 10/20/23  0326   HEMOGLOBIN 10.4* 10.7* 10.3*   HEMATOCRIT 33.2* 34.9* 33.0*   MCV 91.0 92.6 91.9   MCH 28.5 28.4 28.7   PLATELETCT 249 258 212                  Recent Labs     10/18/23  2050 10/18/23  2326 10/20/23  0326   SODIUM 138 134* 141   POTASSIUM 4.2 3.9 4.0   CHLORIDE 100 100 106   CO2 27 26 27   CREATININE 0.96 0.93 0.81               Recent Labs     10/18/23  2050 10/18/23  2326 10/20/23  0326   ALBUMIN 3.3 2.7* 2.7*         Pertinent Micro:              Blood Culture Hold   Date Value Ref Range Status   10/20/2023 Collected   Final         Studies:  CT-EXTREMITY, LOWER WITH LEFT     Result Date: 10/18/2023    10/18/2023 10:12 PM HISTORY/REASON FOR EXAM:  Altered; Left stump abscess deep space infection. TECHNIQUE/EXAM DESCRIPTION AND NUMBER OF VIEWS:  CT scan of the LEFT lower extremity with contrast, with reconstructions. Thin helical 3 mm sections were obtained from the distal femur through the proximal tibia/fibula. Sagittal and coronal multiplanar reconstructions were generated from the axial images. A total of 100 mL of Omnipaque 350 nonionic contrast was administered  IV without complication. Up to date radiation dose reduction adjustments have been utilized to meet ALARA standards for radiation dose reduction. COMPARISON: February 28, 2023 FINDINGS: Limited views of the small bowel and colon appear unremarkable. The bladder  appears within normal limits. There is bony demineralization noted. Left below the knee amputation is observed. There is 5.7 x 4.5 cm peripherally enhancing fluid collection identified at the distal margin of the tibial stump, associated foci of air are seen anteriorly with probable anterior wound. Diffuse subcutaneous fat stranding is seen.      1.  Enhancing fluid collection at the distal soft tissues at below the knee amputation, appearance most compatible with abscess. Appears to communicate with the skin surface. 2.  Suspected abscess abuts the distal tibial stump concerning for changes of osteomyelitis. 3.  Diffuse subcutaneous fat stranding, compatible changes of edema and/or cellulitis.     DX-CHEST-PORTABLE (1 VIEW)     Result Date: 10/18/2023    10/18/2023 10:59 PM HISTORY/REASON FOR EXAM: line placement TECHNIQUE/EXAM DESCRIPTION:  Single AP view of the chest. COMPARISON: Today at 2120 FINDINGS: Right internal jugular central line is seen terminating within the right atrium.   Cardiomegaly is observed. The mediastinal contour appears within normal limits.  The central  pulmonary vasculature appears prominent and indistinct. Bilateral lung volumes are diminished.  Diffuse scattered hazy pulmonary parenchymal opacities are seen. No significant pleural effusions are identified. The bony structures appear age-appropriate.      1.  Pulmonary edema and/or infiltrates are identified, which appear somewhat increased since the prior exam. 2.  Cardiomegaly     CT-ABDOMEN-PELVIS WITH     Result Date: 10/18/2023    10/18/2023 10:12 PM HISTORY/REASON FOR EXAM:  Pain. TECHNIQUE/EXAM DESCRIPTION:   CT scan of the abdomen and pelvis with contrast. Contrast-enhanced helical scanning was obtained from the diaphragmatic domes through the pubic symphysis following the bolus administration of nonionic contrast without complication. 100 mL of Omnipaque 350 nonionic contrast was administered without complication. Low dose  optimization technique was utilized for this CT exam including automated exposure control and adjustment of the mA and/or kV according to patient size. COMPARISON: No prior studies available. FINDINGS: Lower Chest: Scattered bilateral pulmonary consolidations are seen. Moderate right and trace left pleural effusion is seen. Liver: Normal. Spleen: Unremarkable. Pancreas: Unremarkable. Gallbladder: No calcified stones. Biliary: Nondilated. Adrenal glands: Normal. Kidneys: Unremarkable without hydronephrosis. Bowel: No obstruction or acute inflammation. 4.6 cm mid abdominal hernia seen containing a portion of the transverse colon. Lymph nodes: No adenopathy. Vasculature: Atherosclerotic changes are seen including atherosclerotic coronary artery calcifications.. Peritoneum: Unremarkable without ascites. Musculoskeletal: No acute or destructive process. Diffuse subcutaneous fat stranding is seen. Pelvis: No adenopathy or free fluid.      1.  4.6 cm mid abdominal hernia containing a portion of transverse without visualized obstructive changes 2.  Bilateral pulmonary infiltrates. 3.  Moderate right and trace left pleural effusion 4.  Diffuse subcutaneous fat stranding, appearance favoring changes of anasarca 5.  Atherosclerosis and atherosclerotic coronary artery disease     DX-TIBIA AND FIBULA LEFT     Result Date: 10/18/2023  10/18/2023 9:28 PM HISTORY/REASON FOR EXAM: Atraumatic Pain/Swelling/Deformity; From the knee down please and thank you TECHNIQUE/EXAM DESCRIPTION:  AP and lateral views of the LEFT tibia and fibula. COMPARISON:  None FINDINGS: Postsurgical changes of below-the-knee amputation are seen. There is soft tissue gas identified at the distal site anteriorly.      1.  No acute traumatic bony injury. 2.  Soft tissue gas at the distal amputation site anteriorly, appearance suggests open wound and/or underlying infectious etiology. Correlate with exam.     DX-CHEST-PORTABLE (1 VIEW)     Result Date:  10/18/2023    10/18/2023 9:12 PM HISTORY/REASON FOR EXAM: possible sepsis. TECHNIQUE/EXAM DESCRIPTION:  Single AP view of the chest. COMPARISON: September 1, 2023 FINDINGS: Cardiomegaly is observed. The mediastinal contour appears within normal limits.  The central  pulmonary vasculature appears prominent and indistinct. Bilateral lung volumes are diminished.  Diffuse scattered hazy pulmonary parenchymal opacities are seen. Mild blunting of the right costophrenic angle is seen suggesting small right effusion. The bony structures appear age-appropriate.      1.  Pulmonary edema and/or infiltrates. 2.  Trace right pleural effusion 3.  Cardiomegaly     DX-FOOT-2- RIGHT     Result Date: 10/10/2023  Independent review of the patient's x-rays of the right ankle and foot weightbearing taken today including AP, lateral and oblique views demonstrate significant and severe pes planovalgus deformity.  There is subfibular impingement and contact of the calcaneus adjacent to the distal fibula, subluxation of the subtalar as well as the talonavicular joints.  No acute fractures are noted.     DX-ANKLE 3+ VIEWS RIGHT     Result Date: 10/10/2023  Independent review of the patient's x-rays of the right ankle and foot weightbearing taken today including AP, lateral and oblique views demonstrate significant and severe pes planovalgus deformity.  There is subfibular impingement and contact of the calcaneus adjacent to the distal fibula, subluxation of the subtalar as well as the talonavicular joints.  No acute fractures are noted.        ASSESSMENT/PLAN:      48 y.o.  admitted 10/18/2023. Pt has a past medical history of RA on Humira per notes.  Patient also with history of chronic bilateral foot ulcers ongoing since 2022.  He was last seen by ID in 3/23 for group A strep bacteremia at leg tenosynovitis with left foot osteomyelitis.  He underwent left BKA on 3/9/2023.  He presented complaining of altered mentation and fevers.      Hospital Course:   Febrile to 102.2 on arrival, leukocytosis on arrival and ongoing.  CT abdomen pelvis relatively unremarkable but lower lung bases with infiltrates.  CT leg with enhancing fluid collection below-knee amputation, compatible with abscess.  Abscess abuts the distal tibial stump concerning for changes of osteomyelitis.     Problem List  Sepsis  Leukocytosis  Fever, improved  Bacteremia  -Blood cultures on 10/18 +MSSA   Left BKA stump infection, abscess, osteomyelitis  -OR on 10/19 for I&D of abscess, down to level of bone, OR cultures + Streptococcus agalactiae and MSSA  Pneumonia  Rheumatoid arthritis, on immunotherapy  Immune suppression  History of gastric bypass  Alcohol abuse  CHF with moderate AAS and MR  History of left BKA  Right foot planovalgus deformation  Thoracic area back pain     Assessment:       -Notes were reviewed from primary team, radiology, ED, surgery, specialists, etc.   -Reviewed labs to date, microbiology for current admit and prior  -Imaging was independently reviewed and interpreted  -Discussion with ID pharmacy     Plan:  ID recommends ongoing hospitalization due to high risk of of morbidity for the above problems.      Recommendations for antibiotics:   --- Will stop vancomycin and transition to cefazolin given lab confirmation of MSSA infection, the coag negative staph is likely contaminant but will continue to follow-up blood cultures  --- Cefazolin also treat the Streptococcus from wound culture     Recommendations for further/ongoing work-up:   --- Follow-up blood cultures as well as repeat  --- Follow-up OR cultures  --- Significant lung findings, recommend CT chest noncontrast to better characterize pneumonia and to assess for any indication of septic emboli  --- Follow-up TTE  --- Continue to monitor back pain, if this is persistent or worsening will need to consider MRI spine  --- Monitor labs     Recommendations for intervention:   --- Plan is for additional  surgical procedures           Dispo: Awaiting culture results and work-up as above   PICC: TBD        Plan of care discussed with  Carlos Eduardo Andrade M.D.. Will continue to follow     Aury Silva M.D.      DATE OF OPERATION: 10/19/2023     PREOPERATIVE DIAGNOSIS:  Left below-knee amputation residual limb abscess     POSTOPERATIVE DIAGNOSIS: Same     PROCEDURE PERFORMED:   Incision and drainage left below-knee amputation abscess  Wound VAC placement 8 x 4 cm     SURGEON: Jay Roe M.D.      ASSISTANT: None     ANESTHESIA: General     SPECIMEN: None     ESTIMATED BLOOD LOSS: 50 mL     IMPLANTS: None     INDICATIONS: The patient is a 48 y.o. male who presented with increased pain and swelling of his left below-knee amputation site with an open wound consistent with abscess formation.  I discussed the risks and benefits of the above procedure which include but are not limited to risks of infection, wound healing complication, neurovascular injury, pain, malunion, non-union, malrotation, and the medical risks of anesthesia including MI, stroke, and death.  Alternatives to surgery were also discussed, including non-operative management, which I did not recommend.  The patient and/or their POA was in agreement with the plan to proceed, and the informed consent was signed and documented.     DESCRIPTION OF PROCEDURE:  Patient was seen in the preoperative holding area on the day of surgery and marked on the operative site which was the left leg below-knee amputation site. They were transported to the operating room and positioned supine on the operating table.  Care was taken to pad any bony prominences and prominent neurovascular structures.  Anesthesia was induced.  The operative extremity was prescrubbed with a CHG brush followed by an alcohol bath and then prepped and draped in the usual sterile fashion.  A time out was performed in which the correct patient, site, side, procedure, and surgeon's initials  on extremity were confirmed by all operating personnel.      We then turned our attention to the left leg.  I began by incising his previous incision and encountered immediate purulence.  I sharply excised the area of the open wound that appeared to be necrotic over the lateral side using a knife.  I then incised and spread down and expose the area of abscess which was primarily superficial.  I did take the muscle flap down to the bone to inspect the bone.  There was significant amounts of fibrous tissue covering the end of the bone which was excisionally debrided sharply using a rongeur.  All tissue appeared healthy.  I then irrigated the wound thoroughly with 3 L of sterile saline.  I then inspected the wound and achieved hemostasis with Bovie electrocautery.  I then placed a wound VAC sponge into the wound and achieved good seal in the wound VAC.  The patient was then awoken from anesthesia without immediate complication transferred to the PACU in stable condition.     POSTOPERATIVE PLAN:       Inpatient plan: PACU to floor.  Continue antibiotics per the primary team.Plan for repeat debridement and likely closure in 48 hours   Weightbearing status: Nonweightbearing left leg  DVT prophylaxis: Per primary  Outpatient plan: Pending definitive closure     ____________________________________   Jay Roe M.D.   DD: 10/19/2023  6:50 PM     DATE OF OPERATION: 10/22/2023     PREOPERATIVE DIAGNOSIS: Left below-knee amputation abscess     POSTOPERATIVE DIAGNOSIS: Same     PROCEDURE PERFORMED: Left BKA revision primary closure     SURGEON: River Colindres M.D.      ASSISTANT: None     ANESTHESIA: General     SPECIMEN: None     ESTIMATED BLOOD LOSS: 20 mL     IMPLANTS: None        INDICATIONS: The patient is a 48 y.o. male who presented with above.  I discussed the risks and benefits of the procedure which include but are not limited to risks of infection, wound healing complication, neurovascular injury, pain,  malunion, non-union, malrotation, and the medical risks of anesthesia including MI, stroke, and death.  Alternatives to surgery were also discussed, including non-operative management, which I did not recommend.  The patient was in agreement with the plan to proceed, and the informed consent was signed and documented.  I met with the patient pre-operatively and marked the operative extremity with their agreement.  We proceeded to the operating room.      DESCRIPTION OF PROCEDURE:  Patient was seen in the preoperative holding area on the day of surgery. The operative site was marked with my initials.  he was taken to the operating room and placed supine on the operative table.  Anesthesia was induced.  The operative extremity was prepped and draped in the normal sterile fashion.  Operative pause was conducted and the correct patient, site, side, procedure, and surgeon's initials on extremity were identified.  Previous amputation site was evaluated noted be clean without any obvious purulence.  Wound was debrided with a Stokes elevator.  Distal femur was then excised.  Tissue was obtained and sent for culture.  Tourniquet was deflated any active bleeding was then cauterized Bovie cautery.  The wound was then thoroughly irrigated sterile saline closed in layered fashion with 0 Vicryl, 2-0 Vicryl, 2-0 nylon suture.  Sterile dressings applied.  Patient awoken taken to PACU stable condition.     POSTOPERATIVE PLAN: Nonweightbearing left lower extremity weight bearing.  Mobilize with physical and occupational therapies.  DVT prophylaxis with SCDs and Lovenox until mobilizing independently and then can be switched to aspirin for 4 weeks.  Antibiotics per primary team.  The patient will follow up in clinic in 2 weeks to check wounds and remove sutures/staples.        ____________________________________   River Colindres M.D.   DD: 10/22/2023  9:27 AM       Co-morbidities:  See PMH  Potential Risk - Complications:  Contractures, Deep Vein Thrombosis, Pain, Pneumonia, and Pressure Ulcer  Level of Risk: High    Ongoing Medical Management Needed (Medical/Nursing Needs):   Patient Active Problem List    Diagnosis Date Noted    Class 1 obesity due to excess calories without serious comorbidity with body mass index (BMI) of 32.0 to 32.9 in adult 10/21/2023    Bacteremia due to Staphylococcus aureus 10/20/2023    Abscess of left lower extremity 10/20/2023    Somnolence 10/19/2023    Murmur, cardiac 10/19/2023    Acquired planovalgus deformity of right foot 10/19/2023    Septic shock, MSSA bacteremia, L BKA stump abscess (HCC) 10/18/2023    Right foot pain 10/10/2023    Acute right ankle pain 10/10/2023    Blood glucose elevated 09/04/2023    Cough 09/01/2023    Cardiac volume overload 08/29/2023    Anemia 08/24/2023    History of gastric bypass 08/21/2023    Chronic diastolic heart failure with preserved ejection fraction (HCC) 08/21/2023    CHF (congestive heart failure), NYHA class I, acute on chronic, combined (HCC) 08/20/2023    Hypokalemia 08/14/2023    Acute diastolic heart failure (HCC) 08/12/2023    Cardiorenal syndrome 08/12/2023    Elevated liver function tests 08/12/2023    Mitral valve mass 08/12/2023    Left bundle branch block 08/12/2023    KATERYNA (acute kidney injury) ([facility]) 08/12/2023    Obesity (BMI 30-39.9) 05/31/2023    Umbilical hernia without obstruction and without gangrene 05/31/2023    Normocytic anemia 05/31/2023    S/P BKA (below knee amputation) unilateral, left (HCC) 03/21/2023    Vitamin D deficiency 03/14/2023    Tenosynovitis of left lower leg 03/06/2023    Encephalopathy 02/28/2023    Hyponatremia 02/28/2023    Generalized weakness 06/15/2022    Impaired mobility and ADLs 06/15/2022    Right foot ulcer (HCC) 06/15/2022    Chronic use of opiate drug for therapeutic purpose 01/18/2021    Chronic pain syndrome 01/18/2021    Localized osteoporosis without current pathological fracture 10/19/2020    History of  "osteomyelitis 08/14/2020    History of immunosuppressive therapy 08/14/2020    History of atrial fibrillation ( single episode 2019) 07/23/2020    History of cigarette smoking 07/21/2020    Aortic root dilatation (HCC) 09/13/2019    Aortic stenosis, moderate 09/12/2019    Rheumatoid arthritis (HCC) 09/08/2015    Chronic, continuous use of opioids 09/08/2015    Chronic pain of right knee 09/08/2015    History of bleeding ulcers 02/12/2015    MONICA (obstructive sleep apnea) 03/09/2010    Mood disorder (HCC) 05/21/2009    ELISEO (generalized anxiety disorder) 05/21/2009    Insomnia 05/21/2009     A/o  Current Vital Signs:   Temperature: 36.8 °C (98.2 °F) Pulse: 67 Respiration: 16 Blood Pressure: 95/56  Weight: 104 kg (229 lb 15 oz) Height: 177.8 cm (5' 10\")  Pulse Oximetry: 96 % O2 (LPM): 1      Completed Laboratory Reports:  Recent Labs     10/25/23  0500 10/26/23  0430 10/27/23  0324   WBC 11.5* 8.3 6.6   HEMOGLOBIN 10.1* 9.8* 10.1*   HEMATOCRIT 32.1* 31.4* 32.0*   PLATELETCT 142* 148* 150*   SODIUM 137 136 135   POTASSIUM 4.5 4.4 4.6   BUN 10 9 13   CREATININE 0.54 0.46* 0.56   ALBUMIN 2.3* 2.3* 2.4*   GLUCOSE 74 88 91     Additional Labs: Not Applicable    Prior Living Situation:   Housing / Facility: [address]  Lives with - Patient's Self Care Capacity: Spouse, Child Less than 18 Years of Age  Equipment Owned: Wheelchair, Slide Board, Lower Extremity Prosthesis (L.E Prosthesis), Front-Wheel Walker, Sock Aid, Reacher, Tub / Shower Seat    Prior Level of Function / Living Situation:   Physical Therapy: Prior Services: Home-Independent  Housing / Facility: [address]  Equipment Owned: Wheelchair, Slide Board, Lower Extremity Prosthesis (L.E Prosthesis), Front-Wheel Walker, Sock Aid, Reacher, Tub / Shower Seat  Lives with - Patient's Self Care Capacity: Spouse, Child Less than 18 Years of Age     Current Level of Function:      Supine to Sit: Standby Assist  Sit to Supine: Standby Assist  Scooting: Standby " Assist  Bed, Chair, Wheelchair Transfer: Minimal Assist  Transfer Method: Slide Board  Wheelchair Assist:  (assistance with set up and slide board set up)  Sitting in Chair: >15 minutes  Occupational Therapy:   Self Feeding: Independent  Grooming / Hygiene: Independent  Bathing: Independent  Dressing: Independent  Toileting: Independent  Medication Management: Independent  Laundry: Independent  Kitchen Mobility: Independent  Finances: Independent  Home Management: Independent  Shopping: Independent  Prior Services: Home-Independent  Housing / Facility: [address] [facility]  Current Level of Function:   Upper Body Dressing: Supervision  Lower Body Dressing: Moderate Assist  Toileting:  (declined need)  Speech Language Pathology:      Rehabilitation Prognosis/Potential: Good  Estimated Length of Stay: 10-14 days    Nursing:      Continent    Scope/Intensity of Services Recommended:  Physical Therapy: 1.5 hr / day  5 days / week. Therapeutic Interventions Required: Maximize Endurance, Mobility, Strength, and Safety  Occupational Therapy: 1.5 hr / day 5 days / week. Therapeutic Interventions Required: Maximize Self Care, ADLs, IADLs, and Energy Conservation  Rehabilitation Nursin/7. Therapeutic Interventions Required: Monitor Pain, Skin, Vital Signs, Intake and Output, Labs, Safety, and Family Training  Rehabilitation Physician: 3 - 5 days / week. Therapeutic Interventions Required: Medical Management    He requires 24-hour rehabilitation nursing to manage bowel and bladder function, skin care, surgical incision, nutrition and fluid intake, pain control, safety, medication management, and patient/family goals. In addition, rehabilitation nursing will reiterate and reinforce therapy skills and equipment use, including ADLs, as well as provide education to the patient and family. Richard Salinas Stevie AMATO is willing to participate in and is able to tolerate the proposed plan of care.    Rehabilitation Goals and Plan  (Expected frequency & duration of treatment in the IRF):   Return to the Community, Minimal Assist Level of Care, and Family Able to Provide 24/7 Assistance  Anticipated Date of Rehabilitation Admission: 10/27/23  Patient/Family oriented IRF level of care/facility/plan: Yes  Patient/Family willing to participate in IRF care/facility/plan: Yes  Patient able to tolerate IRF level of care proposed: Yes  Patient has potential to benefit IRF level of care proposed: Yes  Comments: Not Applicable    Special Needs or Precautions - Medical Necessity:  Safety Concerns/Precautions:  Fall Risk / High Risk for Falls and Balance  Pain Management  Current Medications:    Current Facility-Administered Medications Ordered in Epic   Medication Dose Route Frequency Provider Last Rate Last Admin    [Held by provider] oxyCODONE immediate-release (Roxicodone) tablet 5 mg  5 mg Oral Q4HRS PRN SIDDHARTH YorkN.P.        Or    [Held by provider] oxyCODONE immediate release (Roxicodone) tablet 10 mg  10 mg Oral Q4HRS PRN SIDDHARTH YorkN.P.   10 mg at 10/27/23 0826    enoxaparin (Lovenox) inj 40 mg  40 mg Subcutaneous DAILY AT 1800 Issac Ortega M.D.   40 mg at 10/26/23 1641    morphine (MS IR) tablet 30 mg  30 mg Oral Q6HRS Estefanía Cuevas, A.P.R.N.   30 mg at 10/27/23 0623    zolpidem (Ambien) tablet 5 mg  5 mg Oral HS PRN Estefanía Cuevas A.P.R.N.   5 mg at 10/26/23 0033    ceFAZolin (Ancef) 2 g in  mL IVPB  2 g Intravenous Q8HRS Aury Silva M.D.   Stopped at 10/27/23 0659    Nozin nasal  swab  1 Applicator Each Nostril BID Carlos Eduardo Andrade M.D.   1 Applicator at 10/26/23 1641    omeprazole (PriLOSEC) capsule 20 mg  20 mg Oral BID Rasheed Dent M.D.   20 mg at 10/27/23 0622    senna-docusate (Pericolace Or Senokot S) 8.6-50 MG per tablet 2 Tablet  2 Tablet Oral BID Estefanía MARTÍN Negro   2 Tablet at 10/27/23 0622    And    polyethylene glycol/lytes (Miralax) PACKET 1 Packet  1 Packet Oral QDAY ANKITN Estefanía MORALES  Cuevas, A.P.R.N.        And    magnesium hydroxide (Milk Of Magnesia) suspension 30 mL  30 mL Oral QDAY PRN Estefanía Cuevas, A.P.R.N.        And    bisacodyl (Dulcolax) suppository 10 mg  10 mg Rectal QDAY PRN Estefanía Cuevas, A.P.R.N.        morphine ER (Ms Contin) tablet 15 mg  15 mg Oral Q12HRS Estefanía Cuevas A.P.R.N.   15 mg at 10/27/23 0623    pregabalin (Lyrica) capsule 150 mg  150 mg Oral TID Estefanía Cuevas A.P.R.N.   150 mg at 10/27/23 0826    DULoxetine (Cymbalta) capsule 60 mg  60 mg Oral DAILY Estefanía Cuevas A.P.R.N.   60 mg at 10/27/23 0622     No current [facility]-ordered outpatient medications on file.     Diet:   DIET ORDERS (From admission to next 24h)       Start     Ordered    10/22/23 1644  Diet Order Diet: Regular  ALL MEALS        Question:  Diet:  Answer:  Regular    10/22/23 1643                    Anticipated Discharge Destination / Patient/Family Goal:  Destination: Home with Assistance Support System: Spouse  Anticipated home health services: OT, PT, Nursing, Social Work, and Aide  Previously used HH service/ provider: Not Applicable  Anticipated DME Needs: Wheelchair  Outpatient Services: OT and PT  Alternative resources to address additional identified needs:   Future Appointments   Date Time Provider Department Center   [facility]2023 12[facility]      Pre-Screen Completed: 10/27/2023 9:50 AM Senia Edward

## 2023-10-27 NOTE — DISCHARGE SUMMARY
Discharge Summary    CHIEF COMPLAINT ON ADMISSION  Chief Complaint   Patient presents with    ALOC     Pt found to be altered by wife. Pt reports feeling lethargic. Pt A+Ox4 but not answering further questioning.        Reason for Admission  EMS    Admission Date  10/18/2023     CODE STATUS  Full Code    HPI & HOSPITAL COURSE  48-year-old male with a past medical history of CHF, rheumatoid arthritis,gastric bypass surgery and recent below the knee amputation was admitted on 10/18/2023 for sepsis secondary to left BKA surgical site infection and osteomyelitis. Patient was started on broad-spectrum antibiotics.  The PDX surgery following for which patient underwent irrigation debridement of left BKA with wound VAC placement on 10/19 and required left BKA revision on 10/22.  Face disease following for which blood cultures were positive for MSSA and echocardiogram without vegetation.  Patient was ultimately transition to a 6-week IV Ancef antibiotic regimen which she is to conclude on 12/1/2023.  Patient was eval by physiatry and accepted to inpatient rehab.  Stable patient with in chronic condition was discharged to inpatient rehab for continuity of care and rehab.    Therefore, he is discharged in good and stable condition to an inpatient rehabilitation hospital.    The patient met 2-midnight criteria for an inpatient stay at the time of discharge.      FOLLOW UP ITEMS POST DISCHARGE  Inpatient rehab to follow posthospital discharge care    DISCHARGE DIAGNOSES  Principal Problem:    Septic shock, MSSA bacteremia, L BKA stump abscess (HCC) (POA: Yes)  Active Problems:    Mood disorder (HCC) (POA: Yes)      Overview: Mood disorder unspecified       On duloxetine 60mg for chronic pain      Report hx of diagnose with bipolar.     Insomnia (POA: Unknown)    MONICA (obstructive sleep apnea) (Chronic) (POA: Yes)      Overview: Diagnosed around ~2010, previously intolerant of CPAP. Saw pulmonology       1/2021, did not follow  through with sleep study for CPAP/BIPAP trial    Rheumatoid arthritis (HCC) (Chronic) (POA: Yes)      Overview: On Humira    Chronic, continuous use of opioids (Chronic) (POA: Yes)    Chronic pain syndrome (Chronic) (POA: Yes)    Chronic diastolic heart failure with preserved ejection fraction (HCC) (POA: Yes)    Acquired planovalgus deformity of right foot (POA: Yes)    Bacteremia due to Staphylococcus aureus (POA: Yes)    Abscess of left lower extremity (POA: Yes)    Class 1 obesity due to excess calories without serious comorbidity with body mass index (BMI) of 32.0 to 32.9 in adult (POA: Unknown)  Resolved Problems:    MRSA bacteremia (POA: Unknown)      FOLLOW UP  Future Appointments   Date Time Provider Department Center   11/30/2023 12:00 PM [facility]     No follow-up provider specified.    MEDICATIONS ON DISCHARGE     Medication List        ASK your doctor about these medications        Instructions   Acetaminophen Extra Strength 500 MG Tabs   Take 2 Tablets by mouth in the morning, at noon, and at bedtime.  Dose: 1,000 mg     albuterol 108 (90 Base) MCG/ACT Aers inhalation aerosol   Inhale 2 Puffs every 6 hours as needed for Shortness of Breath.  Dose: 2 Puff     D3-1000 1000 UNIT Tabs  Generic drug: vitamin D   Take 1 Tablet by mouth every day.  Dose: 1,000 Units     docusate sodium 100 MG Caps  Commonly known as: Colace   Take 200 mg by mouth every day.  Dose: 200 mg     DULoxetine 60 MG Cpep delayed-release capsule  Commonly known as: Cymbalta   Take 1 Capsule by mouth every day.  Dose: 60 mg     Farxiga 10 MG Tabs  Generic drug: dapagliflozin propanediol   Take 1 Tablet by mouth every day for 90 days.  Dose: 10 mg     furosemide 20 MG Tabs  Commonly known as: Lasix   Take 1 Tablet by mouth 2 times a day as needed (water retention). Take 1 tablet daily and may take additional 1 tablet as need for increase in weight by 3 lb/ water retention.  Dose: 20 mg     Humira Pen 40  "MG/0.4ML Pnkt  Generic drug: Adalimumab  Ask about: Which instructions should I use?   INJECT 40MG SUBCUTANEOUSLY  EVERY 2 WEEKS     lidocaine 5 % Ptch  Commonly known as: Lidoderm   Place 2 Patches on the skin every 24 hours.  Dose: 2 Patch     * morphine 30 MG tablet  Commonly known as: MS IR   Take 30 mg by mouth 4 times a day.  Dose: 30 mg     * morphine ER 15 MG Tbcr tablet  Commonly known as: Ms Contin   Take 15 mg by mouth 2 times a day.  Dose: 15 mg     Movantik 25 MG Tabs  Generic drug: Naloxegol Oxalate   Take 25 mg by mouth every day.  Dose: 25 mg     multivitamin Tabs   Take 1 Tablet by mouth every day.  Dose: 1 Tablet     Naloxone 4 MG/0.1ML Liqd  Commonly known as: Narcan   One spray in one nostril for overdose and call 911.     omeprazole 20 MG delayed-release capsule  Commonly known as: PriLOSEC  Ask about: Which instructions should I use?   Take 20 mg by mouth every day.  Dose: 20 mg     potassium chloride SA 10 MEQ Tbcr  Commonly known as: K-Dur   Take 1 Tablet by mouth every day for 90 days.  Dose: 10 mEq     predniSONE 10 MG Tabs  Commonly known as: Deltasone   Take 1 Tablet by mouth 2 times a day.  Dose: 10 mg     pregabalin 150 MG Caps  Commonly known as: Lyrica   Take 150 mg by mouth in the morning, at noon, and at bedtime.  Dose: 150 mg     zolpidem 5 MG Tabs  Commonly known as: Ambien   Take 1 Tablet by mouth at bedtime as needed for Sleep for up to 30 days.  Dose: 5 mg           * This list has 2 medication(s) that are the same as other medications prescribed for you. Read the directions carefully, and ask your doctor or other care provider to review them with you.                  Allergies  Allergies   Allergen Reactions    Nsaids      Bleeding, \"I had a bleeding ulcer\"    Diphenhydramine Unspecified     \"I get agitated\"    Mirtazapine Unspecified     I had a hard time waking up, lacking mental focus.     Penicillins      \"Had some dizziness\"  Tolerated Unasyn 10/2019  Tolerated Zosyn " "02/2023    Promethazine Unspecified     \"I get very agitated\"       DIET  Orders Placed This Encounter   Procedures    Diet Order Diet: Regular     Standing Status:   Standing     Number of Occurrences:   1     Order Specific Question:   Diet:     Answer:   Regular [1]       ACTIVITY  As tolerated.  Weight bearing as tolerated    LINES, DRAINS, AND WOUNDS  This is an automated list. Peripheral IVs will be removed prior to discharge.  PICC Single Lumen 10/24/23 Left Brachial (Active)   Site Assessment Clean;Dry;Intact 10/27/23 0800   Line Status Blood return noted;Scrubbed the hub prior to access;Flushed 10/27/23 0800   Line Secured at (cm) 0 cm 10/24/23 1230   Extremity Circumference (cm) 35 cm 10/24/23 1230   Dressing Type Biopatch;Securing device;Transparent 10/27/23 0800   Dressing Status Clean;Dry;Intact 10/27/23 0800   Dressing Intervention N/A 10/27/23 0800   Dressing Change Due 10/28/23 10/27/23 0800   Date Primary Tubing Changed 10/24/23 10/26/23 0845   NEXT Primary Tubing Change  10/28/23 10/27/23 0800   NEXT IV Connector(s) Change 10/24/23 10/24/23 1230   Line Necessity Assessed Antibiotic Therapy Greater than 7 Days 10/27/23 0800   $ Single Lumen PICC Charge Single kit used 10/24/23 1230     Urethral Catheter Non-latex;Temperature probe (Active)   Site Assessment Clean;Skin intact 10/27/23 0800   Collection Container Standard drainage bag 10/27/23 0800   Urinary Catheter Care Tamper Evident Seal in Place;Drainage Tube Extended;Drainage Bag Not Overfilled;Drainage Tubing Properly Secured;Drainage Bag Below Bladder Level and Not on Floor 10/27/23 0800   Securement Method Securing device (Describe) 10/27/23 0800   Output (mL) 1800 mL 10/27/23 0545      Moisture Associated Skin Damage 08/20/23 Groin (Active)   Drainage Amount None 10/26/23 2000   Periwound Assessment Red;Edema 10/26/23 2000   Periwound Protectant Interdry 10/26/23 2000       Wound 08/20/23 Neuropathic Right right plantar wound (Active)     "   Wound 10/19/23 Incision Leg Left (Active)   Site Assessment JI 10/27/23 0800   Periwound Assessment Clean;Intact;Dry 10/27/23 0800   Margins JI 10/27/23 0800   Closure JI 10/27/23 0800   Drainage Amount None 10/27/23 0800   Drainage Description Sanguineous 10/19/23 2000   Treatments Other (Comment) 10/22/23 1100   Wound Cleansing Not Applicable 10/21/23 0752   Periwound Protectant Not Applicable 10/21/23 0752   Dressing Status Dry;Clean;Intact 10/27/23 0800   Dressing Changed Observed 10/27/23 0800   Dressing Cleansing/Solutions Not Applicable 10/26/23 2000   Dressing Options [place] Dressing 10/27/23 0800   Dressing Change/Treatment Frequency Every 48 hrs, and As Needed 10/27/23 0800      PICC Single Lumen 10/24/23 Left Brachial (Active)   Site Assessment Clean;Dry;Intact 10/27/23 0800   Line Status Blood return noted;Scrubbed the hub prior to access;Flushed 10/27/23 0800   Line Secured at (cm) 0 cm 10/24/23 1230   Extremity Circumference (cm) 35 cm 10/24/23 1230   Dressing Type Biopatch;Securing device;Transparent 10/27/23 0800   Dressing Status Clean;Dry;Intact 10/27/23 0800   Dressing Intervention N/A 10/27/23 0800   Dressing Change Due 10/28/23 10/27/23 0800   Date Primary Tubing Changed 10/24/23 10/26/23 0845   NEXT Primary Tubing Change  10/28/23 10/27/23 0800   NEXT IV Connector(s) Change 10/24/23 10/24/23 1230   Line Necessity Assessed Antibiotic Therapy Greater than 7 Days 10/27/23 0800   $ Single Lumen PICC Charge Single kit used 10/24/23 1230            Urethral Catheter Non-latex;Temperature probe (Active)   Site Assessment Clean;Skin intact 10/27/23 0800   Collection Container Standard drainage bag 10/27/23 0800   Urinary Catheter Care Tamper Evident Seal in Place;Drainage Tube Extended;Drainage Bag Not Overfilled;Drainage Tubing Properly Secured;Drainage Bag Below Bladder Level and Not on Floor 10/27/23 0800   Securement Method Securing device (Describe) 10/27/23 0800   Output (mL) 1800 mL  10/27/23 0545        MENTAL STATUS ON TRANSFER  At baseline     CONSULTATIONS  Critical care medicine  Orthopedic surgery  Infectious disease  Physiatry    PROCEDURES  10/19  Incision and drainage left below-knee amputation abscess  Wound VAC placement 8 x 4 cm    10/22  Left BKA revision primary closure    LABORATORY  Lab Results   Component Value Date    SODIUM 135 10/27/2023    POTASSIUM 4.6 10/27/2023    CHLORIDE 101 10/27/2023    CO2 29 10/27/2023    GLUCOSE 91 10/27/2023    BUN 13 10/27/2023    CREATININE 0.56 10/27/2023    CREATININE 0.95 10/19/2010    GLOMRATE >59 10/19/2010        Lab Results   Component Value Date    WBC 6.6 10/27/2023    WBC 8.4 10/19/2010    HEMOGLOBIN 10.1 (L) 10/27/2023    HEMATOCRIT 32.0 (L) 10/27/2023    PLATELETCT 150 (L) 10/27/2023        Total time of the discharge process exceeds 37 minutes.

## 2023-10-27 NOTE — H&P
Physical Medicine & Rehabilitation  History and Physical (H&P)  &     Post Admission Physician Evaluation (ZACHARIAH)       Date of Admission: 10/27/2023  Date of Service: 10/27/2023   Richard Hubbard II  [id number]    IGC Code to Support Admission: IGC Code / Diagnosis to Support: 0016 - Debility (Non-Cardiac, Non-Pulmonary)  Etiologic Diagnosis: BKA stump complication (HCC)    _______________________________________________    Chief Complaint: Decreased mobility, pain    History of Present Illness:  Adapted from the PM&R Consult by Dr. Cody:   The patient is a 48 y.o. male with a past medical history of  L BKA, RA on Humira , CHF, chronic pain, R charcot foot (followed by Dr. Calvin Pryor) and history of gastric bypass surgery;  who presented on 10/18/2023  8:32 PM with sepsis due to L BKA infection.  Per documentation, patient presented to the ED with AMS and lethargy. Work up revealed WBC 30, and CT LLE showed an enhancing fluid collection in the distal end of the L BKA consistent with osteomyelitis. Ortho and ID were consulted. Patient was started on IV fluids and empiric IV abx for septic shock. Patient was taken to the OR on 10/19 for I and D of L BKA with wound vac placement performed by Dr. Roe. Patient returned to the OR on 10/22 for L BKA revision performed by Dr. Colindres. Blood cultures returned + Mssa. Updated Echo done showed EF 45%, no evidence of vegetations. ID recommending IV Ancef 2g q8hr x 6 weeks, stop date 12/1/23.     Patient sitting up in room. He reports pain is currently controlled at rest. Denies NVD. Denies fever or chills. He reports he was surprised how quickly he went septic.    Review of Systems:     Comprehensive 14 point ROS was reviewed and all were negative except as noted elsewhere in this document.     Past Medical History:  Past Medical History:   Diagnosis Date    ADD (attention deficit disorder)     Alcohol abuse     Allergic rhinitis 5/21/2009    Anemia 09/2019     Anxiety 5/21/2009    Apnea, sleep     Arrhythmia     afib when hospitaized for infection    Back pain 5/21/2009    Bipolar disorder (HCC)     Bleeding ulcer 5/21/2009    Bleeding ulcer 5/21/2009    History of anemia-now on nexium     Bowel habit changes     constipation related to narcotics    Daytime sleepiness     Depression 5/21/2009    Eczema 5/21/2009    GERD (gastroesophageal reflux disease)     Heart burn     on meds    Heart murmur     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    Hemorrhagic disorder (HCC)     hx of bleeding ulcers    History of bleeding ulcers 2/12/2015    Hypertension 05/03/2021    pt states well controlled on meds    Insomnia 5/21/2009    Migraine 5/21/2009    Morning headache     Obesity, morbid ([facility]) 5/21/2009    Osteomyelitis ([facility]) 10/7/2019    Pain 09/2019    Chronic knee and back pain    Pubic ramus fracture ([facility]) 9/28/2019    Rheumatoid arteritis ([facility]) 09/2019    knee, feet right hand    Sleep apnea 5/21/2009    Did not tolerate cpap, does not use it    Snoring        Past Surgical History:  Past Surgical History:   Procedure Laterality Date    INCISION AND DRAINAGE ORTHOPEDIC Left 10/22/2023    Procedure: REVISION, BELOW KNEE AMPUTATION;  Surgeon: River Colindres M.D.;  Location: [facility];  Service: Orthopedics    IRRIGATION & DEBRIDEMENT GENERAL Left 10/19/2023    Procedure: IRRIGATION AND DEBRIDEMENT, WOUND;  Surgeon: Jay Roe M.D.;  Location: [facility];  Service: Trauma    KNEE AMPUTATION BELOW Left 3/9/2023    Procedure: AMPUTATION, BELOW KNEE;  Surgeon: Wayne Chambers M.D.;  Location: [facility];  Service: Orthopedics    PB TOTAL KNEE ARTHROPLASTY Right 7/22/2020    Procedure: ARTHROPLASTY, KNEE, TOTAL;  Surgeon: Temo Pires M.D.;  Location: [facility];  Service: Orthopedics    TENDON TRANSFER Right 1/22/2020    Procedure: RIGHT DISTAL ULNA RESECTION AND EXTENSOR TENDON TRANSFER;  Surgeon: Marine Rushing M.D.;   Location: SURGERY [facility];  Service: Orthopedics    OTHER ORTHOPEDIC SURGERY Right 2020    total right knee replacement    IRRIGATION & DEBRIDEMENT ORTHO Left 10/9/2019    Procedure: IRRIGATION AND DEBRIDEMENT, WOUND - PELVIC OSTEOMYELITIS;  Surgeon: You Olivares M.D.;  Location: SURGERY [facility];  Service: Orthopedics    OTHER SURGICAL PROCEDURE  2019    osteomelitis of pelvis cleaned    GASTRIC BYPASS LAPAROSCOPIC  2000    Lucila en y    CHOLECYSTECTOMY  2000    OTHER SURGICAL PROCEDURE      repair of broken penis       Family History:  Family History   Problem Relation Age of Onset    Hypertension Mother     Arthritis Mother     Depression Mother     Cancer Father         bladder    Schizophrenia Father     Cancer Maternal Grandmother         breast    Heart Disease Maternal Grandmother     Psychiatric Illness Other     Stroke Maternal Aunt     Other Neg Hx         no connective tissue disorders or known aortic disease       Medications:  Current Facility-Administered Medications   Medication Dose    Respiratory Therapy Consult      Pharmacy Consult Request ...Pain Management Review 1 Each  1 Each    hydrALAZINE (Apresoline) tablet 25 mg  25 mg    acetaminophen (Tylenol) tablet 650 mg  650 mg    senna-docusate (Pericolace Or Senokot S) 8.6-50 MG per tablet 2 Tablet  2 Tablet    And    polyethylene glycol/lytes (Miralax) PACKET 1 Packet  1 Packet    And    magnesium hydroxide (Milk Of Magnesia) suspension 30 mL  30 mL    And    bisacodyl (Dulcolax) suppository 10 mg  10 mg    mag hydrox-al hydrox-simeth (Maalox Plus Es Or Mylanta Ds) suspension 20 mL  20 mL    ondansetron (Zofran ODT) dispertab 4 mg  4 mg    Or    ondansetron (Zofran) syringe/vial injection 4 mg  4 mg    traZODone (Desyrel) tablet 50 mg  50 mg    sodium chloride (Ocean) 0.65 % nasal spray 2 Spray  2 Spray    oxyCODONE immediate-release (Roxicodone) tablet 5 mg  5 mg    Or    oxyCODONE immediate release (Roxicodone) tablet 10 mg   10 mg    traMADol (Ultram) 50 MG tablet 50 mg  50 mg    ceFAZolin (Ancef) 2 g in  mL IVPB  2 g    [START ON 10/28/2023] DULoxetine (Cymbalta) capsule 60 mg  60 mg    enoxaparin (Lovenox) inj 40 mg  40 mg    morphine (MS IR) tablet 30 mg  30 mg    morphine ER (Ms Contin) tablet 15 mg  15 mg    pregabalin (Lyrica) capsule 150 mg  150 mg    omeprazole (PriLOSEC) capsule 20 mg  20 mg       Allergies:  Nsaids, Diphenhydramine, Mirtazapine, Penicillins, and Promethazine    Psychosocial History:  Housing / Facility: [address] [facility]  Lives with - Patient's Self Care Capacity: Spouse, Child Less than 18 Years of Age  Equipment Owned: Wheelchair, Slide Board, Lower Extremity Prosthesis (L.E Prosthesis), Front-Wheel Walker, Sock Aid, Reacher, Tub / Shower Seat     Prior Level of Function / Living Situation:   Physical Therapy: Prior Services: Home-Independent  Housing / Facility: [address] [facility]  Equipment Owned: Wheelchair, Slide Board, Lower Extremity Prosthesis (L.E Prosthesis), Front-Wheel Walker, Sock Aid, Reacher, Tub / Shower Seat  Lives with - Patient's Self Care Capacity: Spouse, Child Less than 18 Years of Age     Current Level of Function:      Supine to Sit: Standby Assist  Sit to Supine: Standby Assist  Scooting: Standby Assist  Bed, Chair, Wheelchair Transfer: Minimal Assist  Transfer Method: Slide Board  Wheelchair Assist:  (assistance with set up and slide board set up)  Sitting in Chair: >15 minutes  Occupational Therapy:   Self Feeding: Independent  Grooming / Hygiene: Independent  Bathing: Independent  Dressing: Independent  Toileting: Independent  Medication Management: Independent  Laundry: Independent  Kitchen Mobility: Independent  Finances: Independent  Home Management: Independent  Shopping: Independent  Prior Services: Home-Independent  Housing / Facility: [address] [facility]  Current Level of Function:   Upper Body Dressing: Supervision  Lower Body Dressing: Moderate Assist  Toileting:  (declined  "need)    CURRENT LEVEL OF FUNCTION:   Same as level of function prior to admission to [facility]    Physical Examination:     VITAL SIGNS:   height is 1.803 m (5' 11\") and weight is 93.4 kg (206 lb). His oral temperature is 36.8 °C (98.3 °F). His blood pressure is 100/61 and his pulse is 89. His respiration is 18 and oxygen saturation is 93%.   GENERAL: No apparent distress  HEENT: Normocephalic/atraumatic, EOMI, and PERRL  CARDIAC: Regular rate and rhythm, normal S1, S2   LUNGS: Clear to auscultation   ABDOMINAL: bowel sounds present, soft, and nontender    EXTREMITIES: no contractures, spasticity. 2+edema.    NEURO:  Mental status:  A&Ox4 (person, place, date, situation) answers questions appropriatel  CRANIAL NERVES:   Motor:    4/5 BUE  4/5 RLE, 3/5 L HF  Sensory: intact to light touch through out    Radiology:                  Results for orders placed during the hospital encounter of 10/18/23    CT-CHEST (THORAX) W/O    Impression  1.  Moderate size right pleural effusion is identified.    2.  Probable consolidative atelectasis in the right lower pulmonary lobe.    3.  Bilateral pulmonary opacifications are identified as described above. Findings could be due to inflammation or infection. Pneumonitis and pneumonia are possibilities.    Fleischner Society pulmonary nodule recommendations:  Not Applicable                    Results for orders placed during the hospital encounter of 10/18/23    CT-ABDOMEN-PELVIS WITH    Impression  1.  4.6 cm mid abdominal hernia containing a portion of transverse without visualized obstructive changes  2.  Bilateral pulmonary infiltrates.  3.  Moderate right and trace left pleural effusion  4.  Diffuse subcutaneous fat stranding, appearance favoring changes of anasarca  5.  Atherosclerosis and atherosclerotic coronary artery disease           Laboratory Values:  Recent Labs     10/25/23  0500 10/26/23  0430 10/27/23  0324   SODIUM 137 136 135   POTASSIUM 4.5 " 4.4 4.6   CHLORIDE 100 100 101   CO2 31 33 29   GLUCOSE 74 88 91   BUN 10 9 13   CREATININE 0.54 0.46* 0.56   CALCIUM 7.7* 8.0* 8.0*     Recent Labs     10/25/23  0500 10/26/23  0430 10/27/23  0324   WBC 11.5* 8.3 6.6   RBC 3.54* 3.46* 3.49*   HEMOGLOBIN 10.1* 9.8* 10.1*   HEMATOCRIT 32.1* 31.4* 32.0*   MCV 90.7 90.8 91.7   MCH 28.5 28.3 28.9   MCHC 31.5* 31.2* 31.6*   RDW 58.0* 58.4* 58.4*   PLATELETCT 142* 148* 150*   MPV 10.2 9.8 10.1           Primary Rehabilitation Diagnosis:    This patient is a 48 y.o. male admitted for acute inpatient rehabilitation with BKA stump complication (HCC).    Impairments:   ADLs/IADLs  Mobility    Secondary Diagnosis/Medical Co-morbidities Affecting Function  MSSA bacteremia  Anemia  Thrombocytopenia  Depression  Neuropathy    Relevant Changes Since Preadmission Evaluation:    Status unchanged    The patient's rehabilitation potential is Good  The patient's medical prognosis is good    Rehabilitation Plan:   Discussion and Recommendations:   1. The patient requires an acute inpatient rehabilitation program with a coordinated program of care at an intensity and frequency not available at a lower level of care. This recommendation is substantiated by the patient's medical physicians who recommend that the patient's intervention and assessment of medical issues needs to be done at an acute level of care for patient's safety and maximum outcome.   2. A coordinated program of care will be supplied by an interdisciplinary team of physical therapy, occupational therapy, rehab physician, rehab nursing, and, if needed, speech therapy and rehab psychology. Rehab team presents a patient-specific rehabilitation and education program concentrating on prevention of future problems related to accessibility, mobility, skin, bowel, bladder, sexuality, and psychosocial and medical/surgical problems.   3. Need for Rehabilitation Physician: The rehab physician will be evaluating the patient on a  multi-weekly basis to help coordinate the program of care. The rehab physician communicates between medical physicians, therapists, and nurses to maximize the patient's potential outcome. Specific areas in which the rehab physician will be providing daily assessment include the following:   A. Assessing the patient's heart rate and blood pressure response (vitals monitoring) to activity and making adjustments in medications or conservative measures as needed.   B. The rehab physician will be assessing the frequency at which the program can be increased to allow the patient to reach optimal functional outcome.   C. The rehab physician will also provide assessments in daily skin care, especially in light of patient's impairments in mobility.   D. The rehab physician will provide special expertise in understanding how to work with functional impairment and recommend appropriate interventions, compensatory techniques, and education that will facilitate the patient's outcome.   4. Rehab R.N.   The rehab RN will be working with patient to carry over in room mobility and activities of daily living when the patient is not in 3 hours of skilled therapy. Rehab nursing will be working in conjunction with rehab physician to address all the medical issues above and continue to assess laboratory work and discuss abnormalities with the treating physicians, assess vitals, and response to activity, and discuss and report abnormalities with the rehab physician. Rehab RN will also continue daily skin care, supervise bladder/bowel program, instruct in medication administration, and ensure patient safety.   5. Rehab Therapy: Therapies to treat at intensity and frequency of (may change after completion of evaluation by all therapeutic disciplines):       PT:  Physical therapy to address mobility, transfer, gait training and evaluation for adaptive equipment needs 1 and 1/2 hour/day at least 5 days/week for the duration of the ELOS (see  below)       OT:  Occupational therapy to address ADLs, self-care, home management training, functional mobility/transfers and assistive device evaluation, and community re-integration 1 and 1/2 hour/day at least 5 days/week for the duration of the ELOS (see below).     Medical management / Rehabilitation Issues/ Adverse Potential as part of rehabilitation plan     Rehabilitation Issues/Adverse Potential  1.  BKA abscess - Patient s/p I&D and Revision for abscess and possible osteomyelitis. Patient is NWB BLE  Patient demonstrates functional deficits in strength, balance, coordination, and ADL's. Patient is admitted to [facility] for comprehensive rehabilitation therapy as described below.   Rehabilitation nursing monitors bowel and bladder control, educates on medication administration, co-morbidities and monitors patient safety.    2.  Neurostimulants: None at this time but continue to assess daily for need to initiate should status change.    3.  DVT prophylaxis:  Patient is on Lovenox for anticoagulation upon transfer. Encourage OOB. Monitor daily for signs and symptoms of DVT including but not limited to swelling and pain to prevent the development of DVT that may interfere with therapies.    4.  GI prophylaxis:  On prilosec to prevent gastritis/dyspepsia which may interfere with therapies.    5.  Pain: No issues with pain currently / Controlled with APAP/Oxycodone    6.  Nutrition/Dysphagia: Dietician monitors nutrient intake, recommend supplements prn and provide nutrition education to pt/family to promote optimal nutrition for wound healing/recovery.     7.  Bladder/bowel:  Start bowel and bladder program as described below, to prevent constipation, urinary retention (which may lead to UTI), and urinary incontinence (which will impact upon pt's functional independence).   - Post void bladder scans, I&O cath for PVRs >400  - up to commode after meal     8.  Skin/dermal ulcer prophylaxis:  Monitor for new skin conditions with q.2 h. turns as required to prevent the development of skin breakdown.     9.  Cognition/Behavior: As needed psychologist provides adjustment counseling to illness and psychosocial barriers that may be potential barriers to rehabilitation.     10. Respiratory therapy: RT performs O2 management prn, breathing retraining, pulmonary hygiene and bronchospasm management prn to optimize participation in therapies.     Medical Co-Morbidities/Adverse Potential Affecting Function:   BKA abscess - Patient s/p I&D and Revision for abscess and possible osteomyelitis. Patient is NWB BLE  -PT and OT for mobility and ADLs. Per guidelines, 15 hours per week between PT, OT and/or SLP.  -Follow-up Orthopedics    MSSA bacteremia - Patient on Ancef 2g q8 until 11/19/23 per ID    Anemia - Check AM CBC    Thrombocytopenia  - Check AM CBC    Depression - Patient on Cymbalta     Neuropathy - Patient on Cymbalta 60 mg and Lyrica 150 mg     Pain - Patient on PRN Tylenol, Oxycodone, Lyrica 150 mg TID, and Morphine 30 mg q6    Skin - Patient at risk for skin breakdown due to debility in areas including sacrum, achilles, elbows and head in addition to other sites. Nursing to assess skin daily.     GI Ppx - Patient on Prilosec for GERD prophylaxis. Patient on Senna-docusate for constipation prophylaxis.        DVT Ppx - Patient Lovenox on transfer.    I personally performed a complete drug regimen review and no potential clinically significant medication issues were identified.     Goals/Expected Level of Function Based on Current Medical and Functional Status:  (may change based on patient's medical status and rate of impairment recovery)  Transfers:   Modified Independent  Mobility/Gait:   Modified Independent  ADL's:   Modified Independent    DISPOSITION: Discharge to pre-morbid independent living setting with the supportive care of patient's spouse and family.    ELOS: 10-14  days  ____________________________________    T. Loi Pisano MD/PhD  [facility] - Physical Medicine & Rehabilitation   [facility] - Brain Injury Medicine   ____________________________________    Pt was seen today for 75 min, and entire time spent in face-to-face contact was >50% in counseling and coordination of care as detailed in A/P above.

## 2023-10-27 NOTE — DISCHARGE PLANNING
HTH/SCP TCN chart review completed. Collaborated with LINSEY Chen. Discussed current discharge considerations noting IRF following at this time. Voalte sent to [facility] to collaborate as needed. Per chart review, noted patient anticipated medical clearance today.     10:23AM Voalte received from [facility] at [facility] notifying of patient anticipated discharge to IRF today betweeen 12/12:30PM. CM provided update. TCN met with patient at bedside to provide update regarding anticipated discharge today.     As S[facility] auth completed for anticipated discharge to IRF level of care today, no further acute TCN needs anticipated in patient discharge planning at this time. TCN remains available to further collaborate via voalte should needs in discharge planning arise necessitating TCN involvement prior to anticipated discharge. Thank you.     Completed  PT/OT with recs for post acute placement  Physiatry consult with statement pt is a potential candidate  Choice obtained: IRF ([facility] ); [facility] ([facility]) SNF ([facility]). Noted patient an anticipated discharge to IRF level of care at [facility] today with transportation set between 12/12:30PM. SCP auth complete

## 2023-10-28 NOTE — THERAPY
Physical Therapy   Initial Evaluation     Patient Name: Richard Hubbard II  Age:  48 y.o., Sex:  male  Medical Record #: [id number]  Today's Date: 10/28/2023     Subjective    Pt initially refusing to perform transfers with R LE NWB as he states his surgeon stated that he is permitted to WB for transfers. Agreeable after extensive discussion and following up with surgeon on Monday     Objective       10/28/23 1230   PT Charge Group   PT Therapeutic Exercise (Units) 2   PT Evaluation PT Evaluation Mod   PT Total Time Spent   PT Individual Total Time Spent (Mins) 90   Prior Living Situation   Prior Services Skilled Home Health Services   Housing / Facility 1 Story House   Steps Into Home   (ramped entrance)   Bathroom Set up Bathtub / Shower Combination   Equipment Owned Wheelchair;Slide Board;Lower Extremity Prosthesis (L.E Prosthesis);Tub Transfer Bench;Sock Aid;Reacher;Front-Wheel Walker   Lives with - Patient's Self Care Capacity Spouse;Child Less than 18 Years of Age   Comments Pt was progressing with home health and preapring for surgery on R LE prior to sepsis and L BKA infection   Prior Level of Functional Mobility   Bed Mobility Independent   Transfer Status Independent   Ambulation Required Assist   Distance Ambulation (Feet) 50   Assistive Devices Used Front-Wheel Walker   Wheelchair Independent   Stairs Unable To Determine At This Time   Cognition    Level of Consciousness Alert   Comments Agreeable after extensive education to complete transfers R LE NWB   Strength Lower Body   Lower Body Strength  X   Rt Hip Extension Strength 2+ (P+)   Rt Hip Flexion Strength 3+ (F+)   Rt Knee Flexion Strength 3+ (F+)   Rt Knee Extension Strength 3+ (F+)   Lt Hip Extension Strength 2+ (P+)   Lt Hip Flexion Strength 3- (F-)   Sensation Lower Body   Lower Extremity Sensation   WDL   Balance Assessment   Sitting Balance (Static) Fair +   Sitting Balance (Dynamic) Fair +   Weight Shift Sitting Fair   Bed Mobility     Supine to Sit Moderate Assist   Sit to Supine Standby Assist   Sit to Stand Unable to Participate   Scooting Standby Assist   Rolling Minimal Assist to Rt.;Minimum Assist to Lt.   Roll Left and Right   Assistance Needed Physical assistance   Physical Assistance Level 25% or less   CARE Score - Roll Left and Right 3   Roll Left and Right Discharge Goal   Discharge Goal 6   Sit to Lying   Assistance Needed Supervision   CARE Score - Sit to Lying 4   Sit to Lying Discharge Goal   Discharge Goal 6   Lying to Sitting on Side of Bed   Assistance Needed Physical assistance   Physical Assistance Level 26%-50%   CARE Score - Lying to Sitting on Side of Bed 3   Lying to Sitting on Side of Bed Discharge Goal   Discharge Goal 6   Sit to Stand   Reason if not Attempted Medical concerns   CARE Score - Sit to Stand 88   Sit to Stand Discharge Goal   Discharge Goal 9   Chair/Bed-to-Chair Transfer   Assistance Needed Physical assistance   Physical Assistance Level 25% or less   CARE Score - Chair/Bed-to-Chair Transfer 3   Chair/Bed-to-Chair Transfer Discharge Goal   Discharge Goal 5   Toilet Transfer   Reason if not Attempted Refused to perform   CARE Score - Toilet Transfer 7   Toilet Transfer Discharge Goal   Discharge Goal 6   Car Transfer   Reason if not Attempted Environmental limitations   CARE Score - Car Transfer 10   Car Transfer Discharge Goal   Discharge Goal 3   Walk 10 Feet   Reason if not Attempted Medical concerns   CARE Score - Walk 10 Feet 88   Walk 10 Feet Discharge Goal   Discharge Goal 9   Walk 50 Feet with Two Turns   Reason if not Attempted Medical concerns   CARE Score - Walk 50 Feet with Two Turns 88   Walk 50 Feet with Two Turns Discharge Goal   Discharge Goal 9   Walk 150 Feet   Reason if not Attempted Activity not applicable   CARE Score - Walk 150 Feet 9   Walk 150 Feet Discharge Goal   Discharge Goal 9   Walking 10 Feet on Uneven Surfaces   Reason if not Attempted Activity not applicable   CARE Score -  Walking 10 Feet on Uneven Surfaces 9   Walking 10 Feet on Uneven Surfaces Discharge Goal   Discharge Goal 9   1 Step (Curb)   Reason if not Attempted Activity not applicable   CARE Score - 1 Step (Curb) 9   1 Step (Curb) Discharge Goal   Discharge Goal 9   4 Steps   Reason if not Attempted Activity not applicable   CARE Score - 4 Steps 9   4 Steps Discharge Goal   Discharge Goal 9   12 Steps   Reason if not Attempted Activity not applicable   CARE Score - 12 Steps 9   12 Steps Discharge Goal   Discharge Goal 9   Picking Up Object   Reason if not Attempted Activity not applicable   CARE Score - Picking Up Object 9   Picking Up Object Discharge Goal   Discharge Goal 9   Wheel 50 Feet with Two Turns   Assistance Needed Supervision   CARE Score - Wheel 50 Feet with Two Turns 4   Type of Wheelchair/Scooter Manual   Wheel 50 Feet with Two Turns Discharge Goal   Discharge Goal 6   Wheel 150 Feet   Assistance Needed Supervision   CARE Score - Wheel 150 Feet 4   Type of Wheelchair/Scooter Manual   Wheel 150 Feet Discharge Goal   Discharge Goal 6   Gait Functional Level of Assist    Gait Level Of Assist Unable to Participate   Wheelchair Functional Level of Assist   Wheelchair Assist Stand by Assist   Distance Wheelchair (Feet or Distance) 150x2   Wheelchair Description Limited by fatigue;Leg rest management;Extra time  (frequent rest breaks due to fatigue)   Stairs Functional Level of Assist   Level of Assist with Stairs Unable to Participate   Transfer Functional Level of Assist   Bed, Chair, Wheelchair Transfer Minimal Assist   Bed Chair Wheelchair Transfer Description Set-up of equipment;Slideboard transfer from bed to wheelchair;Supervision for safety;Verbal cueing;Adaptive equipment   Problem List    Problems Pain;Impaired Bed Mobility;Impaired Transfers;Functional ROM Deficit;Functional Strength Deficit;Impaired Balance;Decreased Activity Tolerance;Safety Awareness Deficits / Cognition;Limited Knowledge of Post-Op  "Precautions   Precautions   Precautions Fall Risk;Non Weight Bearing Right Lower Extremity;Non Weight Bearing Left Lower Extremity   Comments 1L O2 PRN   Current Discharge Plan   Current Discharge Plan Return to Prior Living Situation   Interdisciplinary Plan of Care Collaboration   IDT Collaboration with  Physician;Nursing;Certified Nursing Assistant   Patient Position at End of Therapy Seated;Chair Alarm On;Self Releasing Lap Belt Applied;Call Light within Reach;Phone within Reach;Tray Table within Reach   Collaboration Comments WBing precautions   Benefit   Therapy Benefit Patient Would Benefit from Inpatient Rehabilitation Physical Therapy to Maximize Functional [place] with ADLs, IADLs and Mobility.   Strengths & Barriers   Strengths Independent prior level of function;Motivated for self care and independence;Supportive family;Willingly participates in therapeutic activities;Alert and oriented   Barriers Bladder retention;Decreased endurance;Impaired activity tolerance;Impaired balance;Impulsive;Limited mobility;Pain     Demonstrated SB transfer with B LE NWB  Discussed potential balance impairments and alternate transfer methods if pt were to need R LE amputation in the future    Completed mat therex program:  -prone lying 10 min  -prone hamstring curl 3x10  -prone glute sets 3x10 5\"hold  -SAQ R LE 2lb ankle weight L LE no resistance 2x10 5\"hold  -R LE hamstring stretch 2x30\"    Assessment  Patient is 48 y.o. male presenting with severe functional debility after sepsis due to L BKA infection.  Additional factors influencing patient status / progress (ie: cognitive factors, co-morbidities, social support, etc): B LE NWB, frequent hospitalizations in last year, and supportive spouse.      Plan    **Follow up regarding Wbing precautions and if L LE requires immobilizer MD to contact surgeon    Recommend Physical Therapy  minutes per day 5-7 days per week for 7-10 days for the following treatments:  PT " Gait Training, PT Therapeutic Exercises, PT TENS Application, PT Neuro Re-Ed/Balance, PT Therapeutic Activity, and PT Evaluation.    Passport items to be completed:  Get in/out of bed safely, in/out of a vehicle, safely use mobility device, walk or wheel around home/community, navigate up and down stairs, show how to get up/down from the ground, ensure home is accessible, demonstrate HEP, complete caregiver training    Goals:  Long term and short term goals have been discussed with patient and they are in agreement.    Physical Therapy Problems (Active)       Problem: Mobility       Dates: Start:  10/28/23         Goal: STG-Within one week, patient will propel wheelchair community 200ft including 3% grade Adore       Dates: Start:  10/28/23               Problem: Mobility Transfers       Dates: Start:  10/28/23         Goal: STG-Within one week, patient will perform bed mobility independently       Dates: Start:  10/28/23            Goal: STG-Within one week, patient will transfer bed to chair with SBA while maintaining Wbing precautions       Dates: Start:  10/28/23               Problem: PT-Long Term Goals       Dates: Start:  10/28/23         Goal: LTG-By discharge, patient will propel wheelchair 300ft indoors and level outdoors Alfonzo       Dates: Start:  10/28/23            Goal: LTG-By discharge, patient will transfer one surface to another with set up assist while maintaining Wbing precautions       Dates: Start:  10/28/23            Goal: LTG-By discharge, patient will transfer in/out of a car with modA       Dates: Start:  10/28/23

## 2023-10-28 NOTE — PROGRESS NOTES
L arm swollen by QUIRINO PACE MD aware. Pt denies pain, PICC flushed with blood return. No orders given.

## 2023-10-28 NOTE — THERAPY
Occupational Therapy   Initial Evaluation     Patient Name: Richard Hubbard II  Age:  48 y.o., Sex:  male  Medical Record #: [id number]  Today's Date: 10/28/2023     Subjective    Pt pleasant and motivated to participate in OT     Objective     10/28/23 0831   OT Charge Group   Charges Yes   OT Self Care / ADL (Units) 1   OT Evaluation OT Evaluation Mod   OT Total Time Spent   OT Individual Total Time Spent (Mins) 60   Prior Living Situation   Prior Services Home-Independent   Housing / Facility 1 Story House   Steps Into Home 2   Bathroom Set up Bathtub / Shower Combination   Equipment Owned Wheelchair;Slide Board;Lower Extremity Prosthesis (L.E Prosthesis);Tub Transfer Bench;Front-Wheel Walker;Sock Aid;Reacher;Tub / Shower Seat   Lives with - Patient's Self Care Capacity Spouse;Child Less than 18 Years of Age   Prior Level of ADL Function   Self Feeding Independent   Grooming / Hygiene Independent   Bathing Independent   Dressing Independent   Toileting Independent   Prior Level of IADL Function   Medication Management Independent   Laundry Independent   Kitchen Mobility Independent   Finances Independent   Home Management Independent   Shopping Independent   Prior Level Of Mobility Independent With Device in Community;Independent With Device in Home   Driving / Transportation Driving Independent   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)   Prior Functioning: Everyday Activities   Self Care Independent   Indoor Mobility (Ambulation) Independent   Stairs Independent   Functional Cognition Independent   Prior Device Use Manual wheelchair;Walker   Vitals   O2 Delivery Device None - Room Air   Pain   Intervention Declines   Pain 0 - 10 Group   Location Leg   Location Orientation Left   Cognition    Level of Consciousness Alert   ABS (Agitated Behavior Scale)   Agitated Behavior Scale Performed No   Cognitive Pattern Assessment   Cognitive Pattern Assessment Used BIMS   Brief Interview for  "Mental Status (BIMS)   Repetition of Three Words (First Attempt) 3   Temporal Orientation: Year Missed by more than 5 years  (Pt initally stated the year was 2001, and appeared adament about year. Pt eventually acknowleged it was 2023 and appeared to be joking w/ this therapist.)   Temporal Orientation: Month Accurate within 5 days   Temporal Orientation: Day Correct   Recall: \"Sock\" Yes, no cue required   Recall: \"Blue\" Yes, no cue required   Recall: \"Bed\" Yes, no cue required   BIMS Summary Score 12   Confusion Assessment Method (CAM)   Is there evidence of an acute change in mental status from the patient's baseline? No   Inattention Behavior not present   Disorganized thinking Behavior not present   Altered level of consciousness Behavior not present   Vision Screen   Vision   (Pt wears glasses)   Active ROM Upper Body   Active ROM Upper Body  WDL   Balance Assessment   Sitting Balance (Static) Fair +   Sitting Balance (Dynamic) Fair +   Weight Shift Sitting Fair   Bed Mobility    Supine to Sit Standby Assist   Sit to Supine Standby Assist   Scooting Standby Assist   Rolling Standby Assist   Coordination Upper Body   Coordination WDL   Eating   Assistance Needed Set-up / clean-up   Physical Assistance Level No physical assistance   CARE Score - Eating 5   Eating Discharge Goal   Discharge Goal 6   Oral Hygiene   Assistance Needed Set-up / clean-up   Physical Assistance Level No physical assistance   CARE Score - Oral Hygiene 5   Oral Hygiene Discharge Goal   Discharge Goal 6   Shower/Bathe Self   Assistance Needed Supervision   Physical Assistance Level No physical assistance   CARE Score - Shower/Bathe Self 4   Shower/Bathe Self Discharge Goal   Discharge Goal 5   Upper Body Dressing   Assistance Needed Set-up / clean-up   Physical Assistance Level No physical assistance   CARE Score - Upper Body Dressing 5   Upper Body Dressing Discharge Goal   Discharge Goal 6   Lower Body Dressing   Assistance Needed " Physical assistance   Physical Assistance Level 25% or less   CARE Score - Lower Body Dressing 3   Lower Body Dressing Discharge Goal   Discharge Goal 5   Putting On/Taking Off Footwear   Assistance Needed Supervision   Physical Assistance Level No physical assistance   CARE Score - Putting On/Taking Off Footwear 4   Putting On/Taking Off Footwear Discharge Goal   Discharge Goal 5   Toileting Hygiene   Assistance Needed Incidental touching   Physical Assistance Level No physical assistance   CARE Score - Toileting Hygiene 4   Toileting Hygiene Discharge Goal   Discharge Goal 6   Toilet Transfer   Assistance Needed Incidental touching   Physical Assistance Level No physical assistance   CARE Score - Toilet Transfer 4   Toilet Transfer Discharge Goal   Discharge Goal 6   Hearing, Speech, and Vision   Ability to Hear Adequate   Ability to See in Adequate Light Adequate   Expression of Ideas and Wants Without difficulty   Understanding Verbal and Non-Verbal Content Understands   Functional Level of Assist   Eating Supervision   Eating Description Set-up of equipment or meal/tube feeding;Increased time   Grooming Supervision;Seated   Grooming Description Increased time;Initial preparation for task;Seated in wheelchair at sink;Supervision for safety   Bathing Standby Assist   Bathing Description Grab bar;Hand held shower;Tub bench;Increased time;Initial preparation for task;Set-up of equipment;Set up for shower sleeve;Set up for wound protection;Supervision for safety   Upper Body Dressing Supervision   Upper Body Dressing Description Increased time;Initial preparation for task;Supervision for safety   Lower Body Dressing Minimal Assist   Lower Body Dressing Description Initial preparation for task;Increased time;Set-up of equipment;Supervision for safety;Verbal cueing  (Assist to don shorts over hips)   Toileting Contact Guard Assist   Toileting Description Grab bar;Increased time;Initial preparation for task;Set-up of  equipment;Supervision for safety   Bed, Chair, Wheelchair Transfer Contact Guard Assist   Bed Chair Wheelchair Transfer Description Increased time;Initial preparation for task;Set-up of equipment;Supervision for safety;Verbal cueing   Toilet Transfers Contact Guard Assist   Toilet Transfer Description Grab bar;Increased time;Initial preparation for task;Set-up of equipment;Supervision for safety   Tub / Shower Transfers Contact Guard Assist   Tub Shower Transfer Description Grab bar;Shower bench;Increased time;Initial preparation for task;Set-up of equipment;Supervision for safety   Problem List   Problem List Decreased Active Daily Living Skills;Decreased Homemaking Skills;Decreased Functional Mobility;Decreased Activity Tolerance;Safety Awareness Deficits / Cognition;Impaired Postural Control / Balance   Precautions   Precautions Fall Risk;Non Weight Bearing Right Lower Extremity;Non Weight Bearing Left Lower Extremity   Comments 1L O2 PRN   Current Discharge Plan   Current Discharge Plan Return to Prior Living Situation   Benefit    Therapy Benefit Patient Would Benefit from Inpatient Rehab Occupational Therapy to Maximize [place] with ADLs, IADLs and Functional Mobility.   Interdisciplinary Plan of Care Collaboration   Patient Position at End of Therapy In Bed;Bed Alarm On;Call Light within Reach;Tray Table within Reach;Phone within Reach   Strengths & Barriers   Strengths Able to follow instructions;Adequate strength;Independent prior level of function;Motivated for self care and independence;Pleasant and cooperative;Willingly participates in therapeutic activities;Supportive family   Barriers Decreased endurance;Fatigue;Generalized weakness;Impaired activity tolerance;Impaired balance;Limited mobility;Pain   Occupational Therapist Assigned   Assigned OT / Treatment Time / Comments Dwight Marsh (non-primary), 60-90 minutes throughout day       Assessment  Patient is 48 y.o. male with a diagnosis of BKA  stump complication. Additional factors influencing patient status / progress (ie: cognitive factors, co-morbidities, social support, etc): L BKA, RA on Humira , CHF, chronic pain, R charcot foot (followed by Dr. Calvin Pryor) and history of gastric bypass surgery.      Pt seen for eval, presenting w/ deficits as noted above. Pt reports I PLOF prior to admission. Pt is min A to SBA for ADLs and mobility at w/c level. Pt would benefit from OT in order to facilitate and maximize functional gains.    Plan  Recommend Occupational Therapy  minutes per day 5-7 days per week for 14 days for the following treatments:  OT Group Therapy, OT Self Care/ADL, OT Manual Ther Technique, OT Neuro Re-Ed/Balance, OT Therapeutic Activity, OT Evaluation, and OT Therapeutic Exercise.    Passport items to be completed:  Perform bathroom transfers, complete dressing, complete feeding, get ready for the day, prepare a simple meal, participate in household tasks, adapt home for safety needs, demonstrate home exercise program, complete caregiver training     Goals:  Long term and short term goals have been discussed with patient and they are in agreement.    Occupational Therapy Goals (Active)       Problem: Bathing       Dates: Start:  10/28/23         Goal: STG-Within one week, patient will bathe w/ supervision and DME as needed       Dates: Start:  10/28/23               Problem: Dressing       Dates: Start:  10/28/23         Goal: STG-Within one week, patient will complete full dressing routine w/ SBA and AE as needed       Dates: Start:  10/28/23               Problem: IADL's       Dates: Start:  10/28/23         Goal: STG-Within one week, patient will prepare a meal at w/c level w/ SBA       Dates: Start:  10/28/23            Goal: STG-Within one week, patient will utilize washer and dryer at w/c level w/ SBA and AE as needed       Dates: Start:  10/28/23               Problem: OT Long Term Goals       Dates: Start:  10/28/23          Goal: LTG-By discharge, patient will complete basic self care tasks w/ mod I and AE/DME as needed       Dates: Start:  10/28/23            Goal: LTG-By discharge, patient will complete basic home management w/ mod I and LRAD as needed       Dates: Start:  10/28/23               Problem: Toileting       Dates: Start:  10/28/23         Goal: STG-Within one week, patient will complete toileting tasks w/ SBA and DME as needed       Dates: Start:  10/28/23

## 2023-10-28 NOTE — WOUND TEAM
[facility]  Inpatient Services  Initial Wound and Skin Care Evaluation    Admission Date: 10/27/2023     Last order of IP CONSULT TO WOUND CARE was found on 10/27/2023 from Hospital Encounter on 10/26/2023     HPI, PMH, SH: Reviewed    Past Surgical History:   Procedure Laterality Date    INCISION AND DRAINAGE ORTHOPEDIC Left 10/22/2023    Procedure: REVISION, BELOW KNEE AMPUTATION;  Surgeon: River Colindres M.D.;  Location: SURGERY [facility];  Service: Orthopedics    IRRIGATION & DEBRIDEMENT GENERAL Left 10/19/2023    Procedure: IRRIGATION AND DEBRIDEMENT, WOUND;  Surgeon: Jay Roe M.D.;  Location: SURGERY [facility];  Service: Trauma    KNEE AMPUTATION BELOW Left 3/9/2023    Procedure: AMPUTATION, BELOW KNEE;  Surgeon: Wayne Chambers M.D.;  Location: [facility];  Service: Orthopedics    PB TOTAL KNEE ARTHROPLASTY Right 2020    Procedure: ARTHROPLASTY, KNEE, TOTAL;  Surgeon: Temo Pires M.D.;  Location: [facility];  Service: Orthopedics    TENDON TRANSFER Right 2020    Procedure: RIGHT DISTAL ULNA RESECTION AND EXTENSOR TENDON TRANSFER;  Surgeon: Marine Rushing M.D.;  Location: [facility];  Service: Orthopedics    OTHER ORTHOPEDIC SURGERY Right 2020    total right knee replacement    IRRIGATION & DEBRIDEMENT ORTHO Left 10/9/2019    Procedure: IRRIGATION AND DEBRIDEMENT, WOUND - PELVIC OSTEOMYELITIS;  Surgeon: You Olivares M.D.;  Location: [facility];  Service: Orthopedics    OTHER SURGICAL PROCEDURE  2019    osteomelitis of pelvis cleaned    GASTRIC BYPASS LAPAROSCOPIC      Lucila en y    CHOLECYSTECTOMY      OTHER SURGICAL PROCEDURE      repair of broken penis     Social History     Tobacco Use    Smoking status: Some Days     Current packs/day: 0.00     Types: Cigarettes, Cigars     Last attempt to quit: 2023     Years since quittin.7     Passive exposure: Current    Smokeless tobacco: Never     Tobacco comments:     occasional cigar   Substance Use Topics    Alcohol use: No     Alcohol/week: 0.0 oz     Comment: quit 2011 ago- past ETOH abuse     No chief complaint on file.    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 Contact wound team if area fails to progress, deteriorates or with any questions/concerns if something comes up before next scheduled follow up (See below as to whether wound is following and frequency of wound follow up)   Not following, consult as needed  -      WOUND HISTORY:     Found with osteomyelitis to L BKA incision. Returned to OR on 10/19 with Dr. Roe for L BKA incision I&D and then again on 10/22 for L BKA revision with Dr. Colindres. Pt with Charcot's foot to right foot which is being followed by Dr. Pryor.        WOUND ASSESSMENT/LDA    Wound 08/20/23 Diabetic Ulcer Plantar Right midfoot (Active)   Date First Assessed/Time First Assessed: 08/20/23 1449   Present on Original Admission: Yes  Primary Wound Type: Diabetic Ulcer  Wound Orientation: Plantar  Laterality: Right  Wound Description (Comments): midfoot      Assessments 10/28/2023 11:00 AM   Wound Image      Site Assessment Red   Periwound Assessment Intact   Margins Defined edges;Unattached edges   Closure Open to air   Drainage Amount Scant   Drainage Description Serous   Treatments Cleansed;Site care   Wound Cleansing Approved Wound Cleanser   Periwound Protectant Skin Protectant Wipes to Periwound   Dressing Status Clean;Dry;Intact   Dressing Changed Changed   Dressing Cleansing/Solutions Not Applicable   Dressing Options Hydrofera Blue Ready;Silicone Adhesive Foam   Dressing Change/Treatment Frequency Every 72 hrs, and As Needed   NEXT Dressing Change/Treatment Date 10/31/23    Non-staged Wound Description Full thickness   Wound Length (cm) 0.8 cm   Wound Width (cm) 0.3 cm   Wound Depth (cm) 0.4 cm   Wound Surface Area (cm^2) 0.24 cm^2   Wound Volume (cm^3) 0.096 cm^3   Wound Healing % -860   Shape circular   Wound Odor None   Exposed Structures None   WOUND NURSE ONLY - Time Spent with Patient (mins) 30       Moisture Associated Skin Damage 08/20/23 Groin;Buttock;Perineum;Thigh (Active)   First Observed Date/First Observed Time: 08/20/23 1505   Present on Original Admission: Yes  Laterality: Bilateral  Wound Location : Groin;Buttock;Perineum;Thigh      Assessments 10/28/2023 11:00 AM   Wound Image     Drainage Amount None   Periwound Assessment Red   Periwound Protectant Barrier Paste   WOUND NURSE ONLY - Time Spent with Patient (mins) 30       Wound 10/19/23 Incision Leg Left (Active)   Date First Assessed/Time First Assessed: 10/19/23 1018   Primary Wound Type: Incision  Location: Leg  Laterality: Left      Assessments 10/28/2023 11:00 AM   Wound Image     Site Assessment Intact;Drainage   Periwound Assessment Intact   Margins Defined edges;Attached edges   Closure Sutures   Drainage Amount Scant   Drainage Description Serosanguineous   Treatments Cleansed;Site care   Wound Cleansing Approved Wound Cleanser   Periwound Protectant Skin Protectant Wipes to Periwound   Dressing Status Clean;Dry;Intact   Dressing Changed New   Dressing Cleansing/Solutions Not Applicable   Dressing Options Hydrofera Blue Ready;Silicone Adhesive Foam   Dressing Change/Treatment Frequency Every 72 hrs, and As Needed   NEXT Dressing Change/Treatment Date 10/31/23   Wound Team Following Not following   Non-staged Wound Description Not applicable        Vascular:    SUSHMA:   No results found.    Lab Values:    Lab Results   Component Value Date/Time    WBC 7.7 10/28/2023 05:38 AM    WBC 8.4 10/19/2010 12:05 PM    RBC 3.53 (L) 10/28/2023 05:38 AM    RBC 4.96 10/19/2010 12:05 PM    HEMOGLOBIN 9.8 (L) 10/28/2023  05:38 AM    HEMATOCRIT 32.2 (L) 10/28/2023 05:38 AM    CREACTPROT 6.85 (H) 08/15/2023 01:03 AM    SEDRATEWES 40 (H) 08/15/2023 01:03 AM    HBA1C 4.7 10/28/2023 05:38 AM         Culture Results show:  No results found for this or any previous visit (from the past 720 hour(s)).    Pain Level/Medicated:  Patient denies pain       INTERVENTIONS BY WOUND TEAM:  Chart and images reviewed. Discussed with bedside RN. All areas of concern (based on picture review, LDA review and discussion with bedside RN) have been thoroughly assessed. Documentation of areas based on significant findings. This RN in to assess patient. Performed standard wound care which includes appropriate positioning, dressing removal and non-selective debridement. Pictures and measurements obtained weekly if/when required.    Wound:  L BKA incision   Preparation for Dressing removal: Removed without difficulty  Cleansed/Non-selectively Debrided with:  Wound cleanser and Gauze  [place] wound: Cleansed with Wound cleanser and Gauze, Prepped with No Sting  Primary Dressing:  Hydrofera blue    Secondary (Outer) Dressing: silicone adhesive foam (but may be better to utilize island dressing to decrease dressing bulk)      Wound:  R foot   Preparation for Dressing removal: Removed without difficulty  Cleansed/Non-selectively Debrided with:  Wound cleanser and Gauze  [place] wound: Cleansed with Wound cleanser and Gauze, Prepped with No Sting  Primary Dressing:  HFB  Secondary (Outer) Dressing: silicone adhesive foam     Advanced Wound Care Discharge Planning  Number of Clinicians necessary to complete wound care: 1  Is patient requiring IV pain medications for dressing changes:  No   Length of time for dressing change 30 min. (This does not include chart review, pre-medication time, set up, clean up or time spent charting.)    Interdisciplinary consultation: Patient, Bedside RN, N/A.  Pressure injury and staging reviewed with N/A.    EVALUATION / RATIONALE FOR  TREATMENT:     Date:  10/28/23  Wound Status:  Initial evaluation    Left BKA incision approximated and intact. Small serosanguinous drainage on previous dressing. Hydrofera blue applied to manage small drainage. Right plantar foot DFU small, clean, and with minimal drainage. Also applied HFB. Pt NWB to BLE at this time. Sacrum, perineum, scrotum, and posterior thighs with diffuse rash and dermatitis - will order Miconazole GERARD for application.          Goals: Steady decrease in wound area and depth weekly.    NURSING PLAN OF CARE ORDERS:  Dressing changes: See Dressing Care orders  Skin care: See Skin Care orders    NUTRITION RECOMMENDATIONS   Wound Team Recommendations:  N/A    DIET ORDERS (From admission to next 24h)       Start     Ordered    10/27/23 1313  Diet Order Diet: Regular  ALL MEALS        Question:  Diet:  Answer:  Regular    10/27/23 1312                    PREVENTATIVE INTERVENTIONS:    Q shift Nam - performed per nursing policy  Q shift pressure point assessments - performed per nursing policy    Surface/Positioning  Standard/trauma mattress - Currently in Place  Reposition q 2 hours - Currently in Place    Offloading/Redistribution  Float Heels off Bed with Pillows - Ordered         Anticipated discharge plans:  TBD        Vac Discharge Needs:  Vac Discharge plan is purely a recommendation from wound team and not a requirement for discharge unless otherwise stated by physician.  Not Applicable Pt not on a wound vac

## 2023-10-28 NOTE — FLOWSHEET NOTE
10/28/23 0744   Events/Summary/Plan   Events/Summary/Plan 02 pulse ox check   Vital Signs   Pulse 89   Respiration 18   Pulse Oximetry 94 %   $ Pulse Oximetry (Spot Check) Yes   Respiratory Assessment   Level of Consciousness Alert   Chest Exam   Work Of Breathing / Effort Within Normal Limits   Oxygen   O2 (LPM) 1   O2 Delivery Device Silicone Nasal Cannula

## 2023-10-28 NOTE — CARE PLAN
The patient is Stable - Low risk of patient condition declining or worsening    Shift Goals  Clinical Goals: Safety, pain control  Patient Goals: Safety  Family Goals: Education    Problem: Skin Integrity  Goal: Skin integrity is maintained or improved  Outcome: Progressing  Note:   Nam Score: 15    Patient's skin remains intact and free from new or accidental injury this shift; no s/s of infection. RN wound protocol checked. Encouraged hydration and educated about the importance of nutrition to keep skin integrity. Will continue to monitor.       Problem: Fall Risk - Rehab  Goal: Patient will remain free from falls  Outcome: Progressing  Note: Osiris Healy Fall risk Assessment Score: 8    Low fall risk interventions   - Call light within reach   - Yellow  socks   - Belongings within reach   - Bed in the lowest position

## 2023-10-28 NOTE — PROGRESS NOTES
4 Eyes Skin Assessment Completed by FAITH Marshall and FAITH Donovan.    Head WDL  Ears WDL  Nose WDL  Mouth WDL  Neck WDL  Breast/Chest WDL  Shoulder Blades WDL  Spine WDL  (R) Arm/Elbow/Hand WDL  (L) Arm/Elbow/Hand Redness, Bruising, and Edema  Abdomen WDL  Groin Redness and Rash  Scrotum/Coccyx/Buttocks Redness  (R) Leg Redness and Abrasion  (L) Leg Redness and Incision  (R) Heel/Foot/Toe Scab  (L) Heel/Foot/Toe Amputation           Devices In Places LUE PICC      Interventions In Place Pillows    Possible Skin Injury No    Pictures Uploaded Into Epic Yes  Wound Consult Placed Yes  RN Wound Prevention Protocol Ordered Yes

## 2023-10-28 NOTE — PROGRESS NOTES
NURSING DAILY NOTE    Name: Richard Hubbard II   Date of Admission: 10/27/2023   Admitting Diagnosis: BKA stump complication (HCC)  Attending Physician: Keaton Pisano M.d.  Allergies: Nsaids, Diphenhydramine, Mirtazapine, Penicillins, and Promethazine    Safety  Patient Assist     Patient Precautions     Precaution Comments     Bed Transfer Status     Toilet Transfer Status      Assistive Devices  Rails, Wheelchair  Oxygen  Silicone Nasal Cannula  Diet/Therapeutic Dining  Current Diet Order   Procedures    Diet Order Diet: Regular     Pill Administration  whole  Agitated Behavioral Scale     ABS Level of Severity       Fall Risk  Has the patient had a fall this admission?      Osiris Healy Fall Risk Scoring  8, LOW RISK  Fall Risk Safety Measures  bed alarm and chair alarm    Vitals  Temperature: 36.6 °C (97.9 °F)  Temp src: Temporal  Pulse: 87  Respiration: 18  Blood Pressure: 109/49  Blood Pressure MAP (Calculated): 69 MM HG  BP Location: Right, Upper Arm  Patient BP Position: Sitting     Oxygen  Pulse Oximetry: 96 %  O2 (LPM): 1  O2 Delivery Device: Silicone Nasal Cannula    Bowel and Bladder  Last Bowel Movement  10/27/23  Stool Type  Type 1: Separate hard lumps (hard to pass)  Bowel Device     Continent  Bladder:  (piedra in place)   Bowel: No movement  Bladder Function  Urine Void (mL): 800 ml  Urine Color: Yellow  Genitourinary Assessment   Bladder Assessment (WDL):  WDL Except  Piedra Catheter: Present with Active Order  Urine Color: Yellow    Skin  Nam Score   15  Sensory Interventions   Bed Types: Standard/Trauma Mattress  Moisture Interventions  Moisturizers/Barriers: Moisturizer       Pain  Pain Rating Scale  8 - Awful, hard to do anything  Pain Location  Leg  Pain Location Orientation  Left  Pain Interventions   Medication (see MAR)    ADLs    Bathing      Linen Change      Personal Hygiene     Chlorhexidine Bath      Oral Care      Teeth/Dentures     Shave     Nutrition Percentage Eaten     Environmental Precautions     Patient Turns/Positioning  Patient Turns Self from Side to Side  Patient Turns Assistance/Tolerance     Bed Positions     Head of Bed Elevated         Psychosocial/Neurologic Assessment  Psychosocial Assessment     Neurologic Assessment  Neuro (WDL): Within Defined Limits  Level of Consciousness: Alert  EENT (WDL):  WDL Except    Cardio/Pulmonary Assessment  Edema      Respiratory Breath Sounds  RUL Breath Sounds: Diminished  RML Breath Sounds: Diminished  RLL Breath Sounds: Diminished  DONALD Breath Sounds: Diminished  LLL Breath Sounds: Diminished  Cardiac Assessment   Cardiac (WDL):  WDL Except

## 2023-10-28 NOTE — THERAPY
Occupational Therapy  Daily Treatment     Patient Name: Richard Hubbard II  Age:  48 y.o., Sex:  male  Medical Record #: [id number]  Today's Date: 10/28/2023     Precautions  Precautions: Fall Risk, Non Weight Bearing Right Lower Extremity, Non Weight Bearing Left Lower Extremity  Comments: 1L O2 PRN    Subjective    Pt appears fatigued; however, pleasant and willing to participate in OT     Objective     10/28/23 1401   OT Charge Group   Charges Yes   OT Therapy Activity (Units) 1   OT Therapeutic Exercise (Units) 1   OT Total Time Spent   OT Individual Total Time Spent (Mins) 30   Precautions   Precautions Fall Risk;Non Weight Bearing Right Lower Extremity;Non Weight Bearing Left Lower Extremity   Comments 1L O2 PRN   Vitals   O2 Delivery Device None - Room Air   Pain   Intervention Declines   Pain 0 - 10 Group   Location [place]       Cognition    Level of Consciousness Alert   ABS (Agitated Behavior Scale)   Agitated Behavior Scale Performed No   Sleep/Wake Cycle   Sleep & Rest Awake;Out of bed   Vision Screen   Vision   (Pt wears glasses)   Functional Level of Assist   Lower Body Dressing Standby Assist   Lower Body Dressing Description Increased time;Supervision for safety   Bed, Chair, Wheelchair Transfer Contact Guard Assist   Bed Chair Wheelchair Transfer Description Increased time;Slideboard transfer from bed to wheelchair;Set-up of equipment;Supervision for safety;Verbal cueing   Sitting Upper Body Exercises   Sitting Upper Body Exercises Yes   Upper Extremity Bike Level 2 Resistance  (Pt tolerated 15 minutes w/o rest break)   Bed Mobility    Sit to Supine Standby Assist   Scooting Standby Assist   Rolling Standby Assist   Interdisciplinary Plan of Care Collaboration   Patient Position at End of Therapy In Bed;Bed Alarm On;Call Light within Reach;Phone within Reach;Tray Table within Reach   Strengths & Barriers   Strengths Able to follow instructions;Adequate strength;Independent prior level of  function;Motivated for self care and independence;Pleasant and cooperative;Willingly participates in therapeutic activities;Supportive family   Barriers Decreased endurance;Fatigue;Generalized weakness;Impaired activity tolerance;Impaired balance;Limited mobility;Pain     Provided SBA for pt to self-propel in w/c from pt rm <> main gym    Assessment    Pt seen for tx, addressing UE exercise and functional transfers. Pt tolerated activity fair w/ visible fatigue. Pt progressing towards goals. Continue OT POC.    Strengths: Able to follow instructions, Adequate strength, Independent prior level of function, Motivated for self care and independence, Pleasant and cooperative, Willingly participates in therapeutic activities, Supportive family    Barriers: Decreased endurance, Fatigue, Generalized weakness, Impaired activity tolerance, Impaired balance, Limited mobility, Pain    Plan    I/ADLs  UE exercise  Functional mobility  Endurance    DME       Passport items to be completed:  Perform bathroom transfers, complete dressing, complete feeding, get ready for the day, prepare a simple meal, participate in household tasks, adapt home for safety needs, demonstrate home exercise program, complete caregiver training     Occupational Therapy Goals (Active)       Problem: Bathing       Dates: Start:  10/28/23         Goal: STG-Within one week, patient will bathe w/ supervision and DME as needed       Dates: Start:  10/28/23               Problem: Dressing       Dates: Start:  10/28/23         Goal: STG-Within one week, patient will complete full dressing routine w/ SBA and AE as needed       Dates: Start:  10/28/23               Problem: IADL's       Dates: Start:  10/28/23         Goal: STG-Within one week, patient will prepare a meal at w/c level w/ SBA       Dates: Start:  10/28/23            Goal: STG-Within one week, patient will utilize washer and dryer at w/c level w/ SBA and AE as needed       Dates: Start:  10/28/23                Problem: OT Long Term Goals       Dates: Start:  10/28/23         Goal: LTG-By discharge, patient will complete basic self care tasks w/ mod I and AE/DME as needed       Dates: Start:  10/28/23            Goal: LTG-By discharge, patient will complete basic home management w/ mod I and LRAD as needed       Dates: Start:  10/28/23               Problem: Toileting       Dates: Start:  10/28/23         Goal: STG-Within one week, patient will complete toileting tasks w/ SBA and DME as needed       Dates: Start:  10/28/23

## 2023-10-28 NOTE — CARE PLAN
Problem: Skin Integrity  Goal: Skin integrity is maintained or improved  Outcome: Progressing  Note: Obtained order for GERARD cream for fungal infection in groin, completed wound care per orders, and inflated low air loss mattress.     Problem: Pain - Standard  Goal: Alleviation of pain or a reduction in pain to the patient’s comfort goal  Outcome: Not Progressing  Note: Patient continues to report 7/10 pain with scheduled and PRN pain medication.   The patient is Watcher - Medium risk of patient condition declining or worsening    Shift Goals  Clinical Goals: safety, pain control  Patient Goals: pain control  Family Goals: JI    Progress made toward(s) clinical / shift goals:  Completed wound care and placed other preventative measures for skin breakdown.      Patient is not progressing towards the following goals: Offer non-pharmacological treatments for pain and emotional support.      Problem: Pain - Standard  Goal: Alleviation of pain or a reduction in pain to the patient’s comfort goal  Outcome: Not Progressing  Note: Patient continues to report 7/10 pain with scheduled and PRN pain medication.

## 2023-10-29 NOTE — PROGRESS NOTES
NURSING DAILY NOTE    Name: Richard Hubbard II   Date of Admission: 10/27/2023   Admitting Diagnosis: BKA stump complication (HCC)  Attending Physician: Keaton Pisano M.d.  Allergies: Nsaids, Diphenhydramine, Mirtazapine, Penicillins, and Promethazine    Safety  Patient Assist     Patient Precautions  Fall Risk, Non Weight Bearing Right Lower Extremity, Non Weight Bearing Left Lower Extremity  Precaution Comments  1L O2 PRN  Bed Transfer Status  Contact Guard Assist  Toilet Transfer Status   Contact Guard Assist  Assistive Devices  Rails, Wheelchair  Oxygen  Nasal Cannula  Diet/Therapeutic Dining  Current Diet Order   Procedures    Diet Order Diet: Regular     Pill Administration  whole  Agitated Behavioral Scale     ABS Level of Severity       Fall Risk  Has the patient had a fall this admission?   No  Osiris Healy Fall Risk Scoring  9, LOW RISK  Fall Risk Safety Measures  bed alarm and poor balance    Vitals  Temperature: 36.7 °C (98.1 °F)  Temp src: Oral  Pulse: 88  Respiration: 16  Blood Pressure: 93/51  Blood Pressure MAP (Calculated): 65 MM HG  BP Location: Right, Upper Arm  Patient BP Position: Supine     Oxygen  Pulse Oximetry: 97 %  O2 (LPM): 1  O2 Delivery Device: Nasal Cannula    Bowel and Bladder  Last Bowel Movement   (Abdominal hernia)  Stool Type  Type 1: Separate hard lumps (hard to pass)  Bowel Device     Continent  Bladder:  (piedra in place)   Bowel: No movement  Bladder Function  Urine Void (mL): 425 ml  Number of Times Voided: 1  Urine Color: Yellow  Genitourinary Assessment   Bladder Assessment (WDL):  Within Defined Limits  Piedra Catheter: Not Applicable  Urine Color: Yellow  Bladder Device: Urinal    Skin  Nam Score   14  Sensory Interventions   Bed Types: Standard/Trauma Mattress  Moisture Interventions  Moisturizers/Barriers: Barrier Paste, Antifungal Paste      Pain  Pain Rating Scale  7 - Focus of attention,  prevents doing daily activities  Pain Location  Leg  Pain Location Orientation  Left  Pain Interventions   Declines    ADLs    Bathing   Shower, * * With Assistance from, Staff  Linen Change      Personal Hygiene     Chlorhexidine Bath      Oral Care     Teeth/Dentures     Shave     Nutrition Percentage Eaten  *  * Meal *  *, Lunch, Between 50-75% Consumed  Environmental Precautions     Patient Turns/Positioning  Patient Turns Self from Side to Side  Patient Turns Assistance/Tolerance     Bed Positions     Head of Bed Elevated         Psychosocial/Neurologic Assessment  Psychosocial Assessment  Psychosocial (WDL):  Within Defined Limits  Patient Behaviors: Anxious  Neurologic Assessment  Neuro (WDL): Within Defined Limits  Level of Consciousness: Alert  EENT (WDL):  WDL Except    Cardio/Pulmonary Assessment  Edema   Scrotal Edema: Generalized  Respiratory Breath Sounds  RUL Breath Sounds: Diminished  RML Breath Sounds: Diminished  RLL Breath Sounds: Diminished  DONALD Breath Sounds: Diminished  LLL Breath Sounds: Diminished  Cardiac Assessment   Cardiac (WDL):  WDL Except (CHF)

## 2023-10-29 NOTE — PROGRESS NOTES
Piedra removed per patient request.  Physician Ok'd.  PVR's to begin Q 4-6 hours with timed voids Q 3 hours.  Straight cath for bladder scan >400 ml.  8 CC's fluid removed from balloon.  Patient exhibited pain, slowed rate of removal, patient recovered.  Able to remove piedra and provided patient with a urinal.  600 mL urine recorded from piedra bag.

## 2023-10-29 NOTE — CARE PLAN
The patient is Stable - Low risk of patient condition declining or worsening    Problem: Knowledge Deficit - Standard  Goal: Patient and family/care givers will demonstrate understanding of plan of care, disease process/condition, diagnostic tests and medications  Outcome: Progressing. Reviewed POC, all questions answered.        Problem: Pain - Standard  Goal: Alleviation of pain or a reduction in pain to the patient’s comfort goal  Outcome: Progressing. Medicated per MAR and repositioned for comfort.       Shift Goals  Clinical Goals: Safety  Patient Goals: Participate in therapy, pain control  Family Goals: JI

## 2023-10-29 NOTE — PROGRESS NOTES
NURSING DAILY NOTE    Name: Richard Hubbard II   Date of Admission: 10/27/2023   Admitting Diagnosis: BKA stump complication (HCC)  Attending Physician: Keaton Pisano M.d.  Allergies: Nsaids, Diphenhydramine, Mirtazapine, Penicillins, and Promethazine    Safety  Patient Assist     Patient Precautions  Fall Risk, Non Weight Bearing Right Lower Extremity, Non Weight Bearing Left Lower Extremity  Precaution Comments  1L O2 PRN  Bed Transfer Status  Contact Guard Assist  Toilet Transfer Status   Contact Guard Assist  Assistive Devices  Rails, Wheelchair  Oxygen  None - Room Air  Diet/Therapeutic Dining  Current Diet Order   Procedures    Diet Order Diet: Regular     Pill Administration  whole  Agitated Behavioral Scale     ABS Level of Severity       Fall Risk  Has the patient had a fall this admission?      Osiris Healy Fall Risk Scoring  8, LOW RISK  Fall Risk Safety Measures  bed alarm, chair alarm, and poor balance    Vitals  Temperature: 36.7 °C (98.1 °F)  Temp src: Oral  Pulse: (!) 101  Respiration: 16  Blood Pressure: 112/72  Blood Pressure MAP (Calculated): 85 MM HG  BP Location: Right, Upper Arm  Patient BP Position: Sitting     Oxygen  Pulse Oximetry: 93 %  O2 (LPM): 1  O2 Delivery Device: None - Room Air    Bowel and Bladder  Last Bowel Movement  10/27/23  Stool Type  Type 1: Separate hard lumps (hard to pass)  Bowel Device     Continent  Bladder:  (piedra in place)   Bowel: No movement  Bladder Function  Urine Void (mL): 800 ml  Urine Color: Yellow  Genitourinary Assessment   Bladder Assessment (WDL):  WDL Except  Piedra Catheter: Present with Active Order  Urine Color: Yellow    Skin  Nam Score   14  Sensory Interventions   Bed Types: Standard/Trauma Mattress  Moisture Interventions  Moisturizers/Barriers: Barrier Wipes, Barrier Paste, Moisturizer       Pain  Pain Rating Scale  8 - Awful, hard to do anything  Pain Location  Groin,  Leg  Pain Location Orientation  Left  Pain Interventions   Declines    ADLs    Bathing   Shower, * * With Assistance from, Staff  Linen Change      Personal Hygiene     Chlorhexidine Bath      Oral Care     Teeth/Dentures     Shave     Nutrition Percentage Eaten  *  * Meal *  *, Lunch, Between 50-75% Consumed  Environmental Precautions     Patient Turns/Positioning  Patient Turns Self from Side to Side  Patient Turns Assistance/Tolerance     Bed Positions     Head of Bed Elevated         Psychosocial/Neurologic Assessment  Psychosocial Assessment  Psychosocial (WDL):  WDL Except  Patient Behaviors: Anxious  Neurologic Assessment  Neuro (WDL): Within Defined Limits  Level of Consciousness: Alert  EENT (WDL):  WDL Except    Cardio/Pulmonary Assessment  Edema   Scrotal Edema: Generalized  Respiratory Breath Sounds  RUL Breath Sounds: Diminished  RML Breath Sounds: Diminished  RLL Breath Sounds: Diminished  DONALD Breath Sounds: Diminished  LLL Breath Sounds: Diminished  Cardiac Assessment   Cardiac (WDL):  WDL Except (CHF)

## 2023-10-29 NOTE — PROGRESS NOTES
Physical Medicine & Rehabilitation Progress Note  Encounter Date: 10/28/2023    Chief Complaint:   Left below-knee amputation infection    Interval Events (Subjective):  No acute events overnight.    Objective:  VITAL SIGNS: VITAL SIGNS:   Vitals:    10/27/23 2000 10/28/23 0640 10/28/23 0744 10/28/23 1405   BP: 109/49 104/63  112/72   Pulse: 87 96 89 (!) 101   Resp: 18 18 18 16   Temp: 36.6 °C (97.9 °F) 36.7 °C (98 °F)  36.7 °C (98.1 °F)   TempSrc: Temporal Oral  Oral   SpO2: 96% 95% 94% 93%   Weight:       Height:             Gen: NAD  Psych: Mood and affect appropriate  CV: RRR  Resp: CTAB, no upper airway sounds  Abd: NTND  Neuro: Alert and oriented x3    Laboratory Values:  Recent Results (from the past 72 hour(s))   CBC WITHOUT DIFFERENTIAL    Collection Time: 10/26/23  4:30 AM   Result Value Ref Range    WBC 8.3 4.8 - 10.8 K/uL    RBC 3.46 (L) 4.70 - 6.10 M/uL    Hemoglobin 9.8 (L) 14.0 - 18.0 g/dL    Hematocrit 31.4 (L) 42.0 - 52.0 %    MCV 90.8 81.4 - 97.8 fL    MCH 28.3 27.0 - 33.0 pg    MCHC 31.2 (L) 32.3 - 36.5 g/dL    RDW 58.4 (H) 35.9 - 50.0 fL    Platelet Count 148 (L) 164 - 446 K/uL    MPV 9.8 9.0 - 12.9 fL   Comp Metabolic Panel    Collection Time: 10/26/23  4:30 AM   Result Value Ref Range    Sodium 136 135 - 145 mmol/L    Potassium 4.4 3.6 - 5.5 mmol/L    Chloride 100 96 - 112 mmol/L    Co2 33 20 - 33 mmol/L    Anion Gap 3.0 (L) 7.0 - 16.0    Glucose 88 65 - 99 mg/dL    Bun 9 8 - 22 mg/dL    Creatinine 0.46 (L) 0.50 - 1.40 mg/dL    Calcium 8.0 (L) 8.5 - 10.5 mg/dL    Correct Calcium 9.4 8.5 - 10.5 mg/dL    AST(SGOT) 13 12 - 45 U/L    ALT(SGPT) <5 2 - 50 U/L    Alkaline Phosphatase 94 30 - 99 U/L    Total Bilirubin 0.2 0.1 - 1.5 mg/dL    Albumin 2.3 (L) 3.2 - 4.9 g/dL    Total Protein 5.6 (L) 6.0 - 8.2 g/dL    Globulin 3.3 1.9 - 3.5 g/dL    A-G Ratio 0.7 g/dL   ESTIMATED GFR    Collection Time: 10/26/23  4:30 AM   Result Value Ref Range    GFR (CKD-EPI) 130 >60 mL/min/1.73 m 2   CBC WITHOUT  DIFFERENTIAL    Collection Time: 10/27/23  3:24 AM   Result Value Ref Range    WBC 6.6 4.8 - 10.8 K/uL    RBC 3.49 (L) 4.70 - 6.10 M/uL    Hemoglobin 10.1 (L) 14.0 - 18.0 g/dL    Hematocrit 32.0 (L) 42.0 - 52.0 %    MCV 91.7 81.4 - 97.8 fL    MCH 28.9 27.0 - 33.0 pg    MCHC 31.6 (L) 32.3 - 36.5 g/dL    RDW 58.4 (H) 35.9 - 50.0 fL    Platelet Count 150 (L) 164 - 446 K/uL    MPV 10.1 9.0 - 12.9 fL   Comp Metabolic Panel    Collection Time: 10/27/23  3:24 AM   Result Value Ref Range    Sodium 135 135 - 145 mmol/L    Potassium 4.6 3.6 - 5.5 mmol/L    Chloride 101 96 - 112 mmol/L    Co2 29 20 - 33 mmol/L    Anion Gap 5.0 (L) 7.0 - 16.0    Glucose 91 65 - 99 mg/dL    Bun 13 8 - 22 mg/dL    Creatinine 0.56 0.50 - 1.40 mg/dL    Calcium 8.0 (L) 8.5 - 10.5 mg/dL    Correct Calcium 9.3 8.5 - 10.5 mg/dL    AST(SGOT) 7 (L) 12 - 45 U/L    ALT(SGPT) <5 2 - 50 U/L    Alkaline Phosphatase 95 30 - 99 U/L    Total Bilirubin 0.2 0.1 - 1.5 mg/dL    Albumin 2.4 (L) 3.2 - 4.9 g/dL    Total Protein 5.6 (L) 6.0 - 8.2 g/dL    Globulin 3.2 1.9 - 3.5 g/dL    A-G Ratio 0.8 g/dL   ESTIMATED GFR    Collection Time: 10/27/23  3:24 AM   Result Value Ref Range    GFR (CKD-EPI) 122 >60 mL/min/1.73 m 2   CoV-2, Flu A/B, And RSV by PCR ([facility])    Collection Time: 10/27/23  9:45 AM    Specimen: Nasopharyngeal; Respirate   Result Value Ref Range    Influenza virus A RNA Negative Negative    Influenza virus B, PCR Negative Negative    RSV, PCR Negative Negative    SARS-CoV-2 by PCR NotDetected     SARS-CoV-2 Source NP Swab    CBC with Differential    Collection Time: 10/28/23  5:38 AM   Result Value Ref Range    WBC 7.7 4.8 - 10.8 K/uL    RBC 3.53 (L) 4.70 - 6.10 M/uL    Hemoglobin 9.8 (L) 14.0 - 18.0 g/dL    Hematocrit 32.2 (L) 42.0 - 52.0 %    MCV 91.2 81.4 - 97.8 fL    MCH 27.8 27.0 - 33.0 pg    MCHC 30.4 (L) 32.3 - 36.5 g/dL    RDW 57.9 (H) 35.9 - 50.0 fL    Platelet Count 170 164 - 446 K/uL    MPV 10.0 9.0 - 12.9 fL    Neutrophils-Polys 67.00 44.00  - 72.00 %    Lymphocytes 22.10 22.00 - 41.00 %    Monocytes 6.00 0.00 - 13.40 %    Eosinophils 2.30 0.00 - 6.90 %    Basophils 0.50 0.00 - 1.80 %    Immature Granulocytes 2.10 (H) 0.00 - 0.90 %    Nucleated RBC 0.00 0.00 - 0.20 /100 WBC    Neutrophils (Absolute) 5.16 1.82 - 7.42 K/uL    Lymphs (Absolute) 1.70 1.00 - 4.80 K/uL    Monos (Absolute) 0.46 0.00 - 0.85 K/uL    Eos (Absolute) 0.18 0.00 - 0.51 K/uL    Baso (Absolute) 0.04 0.00 - 0.12 K/uL    Immature Granulocytes (abs) 0.16 (H) 0.00 - 0.11 K/uL    NRBC (Absolute) 0.00 K/uL   Comp Metabolic Panel (CMP)    Collection Time: 10/28/23  5:38 AM   Result Value Ref Range    Sodium 136 135 - 145 mmol/L    Potassium 4.7 3.6 - 5.5 mmol/L    Chloride 100 96 - 112 mmol/L    Co2 28 20 - 33 mmol/L    Anion Gap 8.0 7.0 - 16.0    Glucose 77 65 - 99 mg/dL    Bun 16 8 - 22 mg/dL    Creatinine 0.91 0.50 - 1.40 mg/dL    Calcium 8.1 (L) 8.5 - 10.5 mg/dL    Correct Calcium 9.1 8.5 - 10.5 mg/dL    AST(SGOT) 9 (L) 12 - 45 U/L    ALT(SGPT) <5 2 - 50 U/L    Alkaline Phosphatase 98 30 - 99 U/L    Total Bilirubin 0.2 0.1 - 1.5 mg/dL    Albumin 2.7 (L) 3.2 - 4.9 g/dL    Total Protein 6.1 6.0 - 8.2 g/dL    Globulin 3.4 1.9 - 3.5 g/dL    A-G Ratio 0.8 g/dL   HEMOGLOBIN A1C    Collection Time: 10/28/23  5:38 AM   Result Value Ref Range    Glycohemoglobin 4.7 4.0 - 5.6 %    Est Avg Glucose 88 mg/dL   TSH with Reflex to FT4    Collection Time: 10/28/23  5:38 AM   Result Value Ref Range    TSH 9.280 (H) 0.380 - 5.330 uIU/mL   Vitamin D, 25-hydroxy (blood)    Collection Time: 10/28/23  5:38 AM   Result Value Ref Range    25-Hydroxy   Vitamin D 25 16 (L) 30 - 100 ng/mL   ESTIMATED GFR    Collection Time: 10/28/23  5:38 AM   Result Value Ref Range    GFR (CKD-EPI) 104 >60 mL/min/1.73 m 2   FREE THYROXINE    Collection Time: 10/28/23  5:38 AM   Result Value Ref Range    Free T-4 0.95 0.93 - 1.70 ng/dL       Medications:  Scheduled Medications   Medication Dose Frequency    miconazole   BID     Pharmacy Consult Request  1 Each PHARMACY TO DOSE    senna-docusate  2 Tablet BID    ceFAZolin  2 g Q8HRS    DULoxetine  60 mg DAILY    enoxaparin (LOVENOX) injection  40 mg DAILY AT 1800    morphine  30 mg Q6HRS    morphine ER  15 mg Q12HRS    pregabalin  150 mg TID    omeprazole  20 mg BID    normal saline (PF)  20 mL Q12HRS     PRN medications: Respiratory Therapy Consult, hydrALAZINE, acetaminophen, senna-docusate **AND** polyethylene glycol/lytes **AND** magnesium hydroxide **AND** bisacodyl, mag hydrox-al hydrox-simeth, ondansetron **OR** ondansetron, traZODone, sodium chloride, oxyCODONE immediate-release **OR** oxyCODONE immediate-release, traMADol, zolpidem    Bowel:  Last Documented BM Date:Last BM: 10/27/23  Last Documented BM Description: Stool Description: Large;Brown    Bladder:  Last Documented Urine Void (mL): 800 ml  Last Documented Bladder Scan:    Last Documented Bladder Scan Results (mL):      Physical Therapy:  Bed Mobility    Transfers Contact Guard Assist  Increased time;Slideboard transfer from bed to wheelchair;Set-up of equipment;Supervision for safety;Verbal cueing   Mobility Unable to Participate   Stairs    Barriers to Discharge Home:      Occupational Therapy:  Grooming Supervision;Seated   Bathing Standby Assist   UB Dressing Supervision   LB Dressing Standby Assist   Toileting Contact Guard Assist   Shower & Transfer Contact Guard Assist   Barriers to Discharge Home:    Diet:  Current Diet Order   Procedures    Diet Order Diet: Regular     Medical Decision Making and Plan:  BKA abscess - Patient s/p I&D and Revision for abscess and possible osteomyelitis. Patient is NWB BLE  -PT and OT for mobility and ADLs. Per guidelines, 15 hours per week between PT, OT and/or SLP.  -Follow-up Orthopedics     MSSA bacteremia - Patient on Ancef 2g q8 until 11/19/23 per ID     Anemia -labs reviewed.  Vitals reviewed.  Stable.     Thrombocytopenia  -Labs reviewed.  Stable     Depression -continue  Cymbalta     Neuropathy -continue  Cymbalta 60 mg and Lyrica 150 mg      Pain - Patient on PRN Tylenol, Oxycodone, Lyrica 150 mg TID, and Morphine 30 mg q6     Skin - Patient at risk for skin breakdown due to debility in areas including sacrum, achilles, elbows and head in addition to other sites. Nursing to assess skin daily.      GI Ppx - Patient on Prilosec for GERD prophylaxis. Patient on Senna-docusate for constipation prophylaxis.      DVT Ppx - Patient Lovenox on transfer.    _____________________________________    Gerardo Gutierrez MD  [facility] - Physical Medicine & Rehabilitation     _____________________________________

## 2023-10-29 NOTE — FLOWSHEET NOTE
10/29/23 0658   Events/Summary/Plan   Events/Summary/Plan RA pulse ox check   Vital Signs   Pulse 78   Respiration 16   Pulse Oximetry 95 %   $ Pulse Oximetry (Spot Check) Yes   Respiratory Assessment   Level of Consciousness Alert   Chest Exam   Work Of Breathing / Effort Within Normal Limits   Oxygen   O2 Delivery Device Room air w/o2 available

## 2023-10-29 NOTE — CARE PLAN
Problem: Pain - Standard  Goal: Alleviation of pain or a reduction in pain to the patient’s comfort goal  Outcome: Progressing  Patient has verbalized understanding to notify staff prior to pain becoming unbearable and to participate in his pain management regimen.     Problem: Fall Risk - Rehab  Goal: Patient will remain free from falls  Outcome: Progressing  Patient verbalized understanding to notify staff for needs and for ambulation and toileting.     The patient is Stable - Low risk of patient condition declining or worsening    Shift Goals  Clinical Goals: Safety, pain control  Patient Goals: Pain control  Family Goals: JI    Progress made toward(s) clinical / shift goals:  Progressing    Patient is not progressing towards the following goals:

## 2023-10-30 NOTE — PROGRESS NOTES
NURSING DAILY NOTE    Name: Richard Hubbard II   Date of Admission: 10/27/2023   Admitting Diagnosis: BKA stump complication (HCC)  Attending Physician: Keaton Pisano M.d.  Allergies: Nsaids, Diphenhydramine, Mirtazapine, Penicillins, and Promethazine    Safety  Patient Assist     Patient Precautions  Fall Risk, Non Weight Bearing Right Lower Extremity, Non Weight Bearing Left Lower Extremity  Precaution Comments  1L O2 PRN  Bed Transfer Status  Contact Guard Assist  Toilet Transfer Status   Contact Guard Assist  Assistive Devices  Rails, Slide board, Wheelchair, Wheelchair push  Oxygen  Room air w/o2 available  Diet/Therapeutic Dining  Current Diet Order   Procedures    Diet Order Diet: Regular     Pill Administration  whole  Agitated Behavioral Scale     ABS Level of Severity       Fall Risk  Has the patient had a fall this admission?   No  Osiris Healy Fall Risk Scoring  9, LOW RISK  Fall Risk Safety Measures  bed alarm and chair alarm    Vitals  Temperature: 36.9 °C (98.4 °F)  Temp src: Oral  Pulse: 88  Respiration: 18  Blood Pressure: 100/60  Blood Pressure MAP (Calculated): 73 MM HG  BP Location: Left, Upper Arm  Patient BP Position: Sitting     Oxygen  Pulse Oximetry: 96 %  O2 (LPM): 1  O2 Delivery Device: Room air w/o2 available    Bowel and Bladder  Last Bowel Movement  10/29/23  Stool Type  Type 4: Like a sausage or snake, smooth and soft  Bowel Device     Continent  Bladder:  (piedra in place)   Bowel: No movement  Bladder Function  Urine Void (mL): 300 ml  Number of Times Voided: 1  Urine Color: Yellow  Genitourinary Assessment   Bladder Assessment (WDL):  WDL Except  Piedra Catheter: Not Applicable  Urine Color: Yellow  Bladder Device: Urinal  Bladder Scan: Post Void  $ Bladder Scan Results (mL): 98    Skin  Nam Score   14  Sensory Interventions   Bed Types: Standard/Trauma Mattress  Skin Preventative Measures: Pillows in Use for  Support / Positioning  Moisture Interventions  Moisturizers/Barriers: Barrier Paste, Antifungal Paste      Pain  Pain Rating Scale  7 - Focus of attention, prevents doing daily activities  Pain Location  Leg  Pain Location Orientation  Left  Pain Interventions   Medication (see MAR), Repositioned, Rest    ADLs    Bathing   Shower, * * With Assistance from, Staff  Linen Change      Personal Hygiene  Moist Charlotte Wipes, Perineal Care  Chlorhexidine Bath      Oral Care     Teeth/Dentures     Shave     Nutrition Percentage Eaten  *  * Meal *  *, Lunch, Between 50-75% Consumed  Environmental Precautions     Patient Turns/Positioning  Patient Turns Self from Side to Side  Patient Turns Assistance/Tolerance     Bed Positions     Head of Bed Elevated         Psychosocial/Neurologic Assessment  Psychosocial Assessment  Psychosocial (WDL):  WDL Except  Patient Behaviors: Anxious  Neurologic Assessment  Neuro (WDL): Within Defined Limits  Level of Consciousness: Alert  EENT (WDL):  WDL Except    Cardio/Pulmonary Assessment  Edema   Scrotal Edema: Generalized  Respiratory Breath Sounds  RUL Breath Sounds: Diminished  RML Breath Sounds: Diminished  RLL Breath Sounds: Diminished  DONALD Breath Sounds: Diminished  LLL Breath Sounds: Diminished  Cardiac Assessment   Cardiac (WDL):  WDL Except (CHF)

## 2023-10-30 NOTE — THERAPY
Occupational Therapy  Daily Treatment     Patient Name: Richard Hubbard II  Age:  48 y.o., Sex:  male  Medical Record #: [id number]  Today's Date: 10/30/2023     Precautions  Precautions: (P) Fall Risk, Non Weight Bearing Right Lower Extremity, Non Weight Bearing Left Lower Extremity  Comments: 1L O2 PRN         Subjective    Patient was supine in bed and agreeable to OT.  He is disappointed he will miss Halloween/trick or treating with his kids tomorrow night.  Would like to return home as soon as possible.       Objective       10/30/23 1031   OT Charge Group   OT Therapy Activity (Units) 2   OT Therapeutic Exercise (Units) 2   OT Total Time Spent   OT Individual Total Time Spent (Mins) 60   Precautions   Precautions Fall Risk;Non Weight Bearing Right Lower Extremity;Non Weight Bearing Left Lower Extremity   Functional Level of Assist   Bed, Chair, Wheelchair Transfer Supervised   Bed Chair Wheelchair Transfer Description Set-up of equipment;Supervision for safety;Slideboard transfer from bed to wheelchair  (supervised slideboard transfer bed <> w/c, pt able to place and remove slideboard)   Sitting Upper Body Exercises   Sitting Upper Body Exercises Yes   Lat Pull 3 sets of 15;Bilateral;Weight (See Comments for lbs)  (25 lbs on equalizer)   Tricep Press 3 sets of 10;Bilateral;Weight (See Comments for lbs)  (30 lbs on rickshaw)   Bed Mobility    Supine to Sit Modified Independent   Sit to Supine Modified Independent   Scooting Modified Independent   Interdisciplinary Plan of Care Collaboration   IDT Collaboration with  Physician   Patient Position at End of Therapy In Bed;Call Light within Reach;Tray Table within Reach;Phone within Reach;Bed Alarm On   Collaboration Comments Dr. Pisano rounded with patient; Dr. Pisano to get ahold of surgeon for R LE WB precaution clarification     W/C mobility mod I (extra time) from room <> back gym.  Propelled self around main gym to meet different therapy dogs.  Spent  several minutes with dogs for psychosocial benefits.      Dr. Pisano note from this morning stated patient is touchdown WB to R LE, per surgeon's most recent note.      Assessment    Patient tolerated w/c mobility, bed mobility, bed <> w/c transfers and UB exercises well and without complaints.  He maintains NWB to B LE well.    Strengths: Able to follow instructions, Adequate strength, Independent prior level of function, Motivated for self care and independence, Pleasant and cooperative, Willingly participates in therapeutic activities, Supportive family  Barriers: Decreased endurance, Fatigue, Generalized weakness, Impaired activity tolerance, Impaired balance, Limited mobility, Pain    Plan    ADLs, IADLs, safety with transfers, strength/endurance    Occupational Therapy Goals (Active)       Problem: Bathing       Dates: Start:  10/28/23         Goal: STG-Within one week, patient will bathe w/ supervision and DME as needed       Dates: Start:  10/28/23               Problem: Dressing       Dates: Start:  10/28/23         Goal: STG-Within one week, patient will complete full dressing routine w/ SBA and AE as needed       Dates: Start:  10/28/23               Problem: IADL's       Dates: Start:  10/28/23         Goal: STG-Within one week, patient will prepare a meal at w/c level w/ SBA       Dates: Start:  10/28/23            Goal: STG-Within one week, patient will utilize washer and dryer at w/c level w/ SBA and AE as needed       Dates: Start:  10/28/23               Problem: OT Long Term Goals       Dates: Start:  10/28/23         Goal: LTG-By discharge, patient will complete basic self care tasks w/ mod I and AE/DME as needed       Dates: Start:  10/28/23            Goal: LTG-By discharge, patient will complete basic home management w/ mod I and LRAD as needed       Dates: Start:  10/28/23               Problem: Toileting       Dates: Start:  10/28/23         Goal: STG-Within one week, patient will complete  toileting tasks w/ SBA and DME as needed       Dates: Start:  10/28/23

## 2023-10-30 NOTE — THERAPY
Physical Therapy   Daily Treatment     Patient Name: Richard Hubbard II  Age:  48 y.o., Sex:  male  Medical Record #: [id number]  Today's Date: 10/30/2023     Precautions  Precautions: Fall Risk, Non Weight Bearing Left Lower Extremity, Other (See Comments)  Comments: Touchdown WB R LE, per surgeon's most recent note    Subjective    Patient frustrated he cannot do SPT anymore, staff asking him to use slide board to comply with PA orders of NWB on right     Objective       10/30/23 1531   PT Charge Group   PT Therapeutic Activities (Units) 2   PT Total Time Spent   PT Individual Total Time Spent (Mins) 30   Precautions   Precautions Fall Risk;Non Weight Bearing Left Lower Extremity;Other (See Comments)   Comments Touchdown WB R LE, per surgeon's most recent note   Transfer Functional Level of Assist   Bed, Chair, Wheelchair Transfer Contact Guard Assist   Bed Chair Wheelchair Transfer Description Set-up of equipment;Verbal cueing;Slideboard transfer from bed to wheelchair   Supine Lower Body Exercise   Supine Lower Body Exercises   (reviewed supine ther-ex program)   Interdisciplinary Plan of Care Collaboration   IDT Collaboration with  Physical Therapist Assistant (PTA)   Collaboration Comments treatment planning       Assessment    Demonstrates CGA slide board transfers and SPV for bed mobility    Strengths: Independent prior level of function, Motivated for self care and independence, Supportive family, Willingly participates in therapeutic activities, Alert and oriented  Barriers: Bladder retention, Decreased endurance, Impaired activity tolerance, Impaired balance, Impulsive, Limited mobility, Pain    Plan    Confirm wbing statis for R LE  Generalized strengthening and endurance  BKA exercises, progress independence with transfers  Discuss home entry and car transfer      DME        Passport items to be completed:  Get in/out of bed safely, in/out of a vehicle, safely use mobility device, walk or wheel  around home/community, navigate up and down stairs, show how to get up/down from the ground, ensure home is accessible, demonstrate HEP, complete caregiver training    Physical Therapy Problems (Active)       Problem: Mobility       Dates: Start:  10/28/23         Goal: STG-Within one week, patient will propel wheelchair community 200ft including 3% grade Adore       Dates: Start:  10/28/23               Problem: Mobility Transfers       Dates: Start:  10/28/23         Goal: STG-Within one week, patient will perform bed mobility independently       Dates: Start:  10/28/23            Goal: STG-Within one week, patient will transfer bed to chair with SBA while maintaining Wbing precautions       Dates: Start:  10/28/23               Problem: PT-Long Term Goals       Dates: Start:  10/28/23         Goal: LTG-By discharge, patient will propel wheelchair 300ft indoors and level outdoors Alfonzo       Dates: Start:  10/28/23            Goal: LTG-By discharge, patient will transfer one surface to another with set up assist while maintaining Wbing precautions       Dates: Start:  10/28/23            Goal: LTG-By discharge, patient will transfer in/out of a car with modA       Dates: Start:  10/28/23

## 2023-10-30 NOTE — DIETARY
"Nutrition services: Day 3 of admit.  Richard Hubbard II is a 48 y.o. male with admitting DX of BKA stump complication w/ infection/possible osteomyelitis of BKA site.     Consult received for MST of 2 on nutritiion screen due to report of 14-23 lb wt loss x 1 month. No report of poor PO PTA.       Assessment:  Height: 180.3 cm (5' 11\")  Weight: 93 kg (205 lb 0.4 oz)  Adjusted wt (ABW) for BKA: 98.8 kg (217 lb)  Body mass index based on ABW is 30.5 kg/m²., BMI classification: class 1 obesity  Diet/Intake: regular/ 50-75% x 3 and % x 1.     Evaluation:   History of  L BKA, RA, CHF, chronic pain, R charcot foot and history of gastric bypass surgery  Wt has been variable since 8/12/23. Pt reports that wt loss was fluid related due to hx of heart failure. No report of poor appetite or change in PO intake. Pt's gastric bypass was in 2000.   Pt requested visit from NR so that he can order his meals. NR aware and will visit pt.     Malnutrition Risk: ASPEN criteria for malnutrition not met    Recommendations/Plan:   Encourage intake of > 50%  Document intake of all meals  as % taken in ADL's to provide interdisciplinary communication across all shifts.   Monitor weight.  Nutrition rep will continue to see patient for ongoing meal and snack preferences.     RD will follow weekly or PRN  "

## 2023-10-30 NOTE — DISCHARGE PLANNING
CASE MANAGEMENT INITIAL ASSESSMENT    Admit Date:  10/27/2023     Case Management has reviewed the medical chart and will meet with patient to discuss role of case management / discharge planning / team conference.   Patient is a  48 y.o. [place] [facility] [place]    Attending physician: Dr. Pisano  PCP: Dr. Dover   Orthopedic: Dr. Roe, Dr. Colindres     Diagnosis: BKA stump complication (HCC) [T87.9]    Co-morbidities:   Patient Active Problem List    Diagnosis Date Noted    BKA stump complication (HCC) 10/27/2023    Class 1 obesity due to excess calories without serious comorbidity with body mass index (BMI) of 32.0 to 32.9 in adult 10/21/2023    Bacteremia due to Staphylococcus aureus 10/20/2023    Abscess of left lower extremity 10/20/2023    Somnolence 10/19/2023    Murmur, cardiac 10/19/2023    Acquired planovalgus deformity of right foot 10/19/2023    Septic shock, MSSA bacteremia, L BKA stump abscess (HCC) 10/18/2023    Right foot pain 10/10/2023    Acute right ankle pain 10/10/2023    Blood glucose elevated 09/04/2023    Cough 09/01/2023    Cardiac volume overload 08/29/2023    Anemia 08/24/2023    History of gastric bypass 08/21/2023    Chronic diastolic heart failure with preserved ejection fraction (HCC) 08/21/2023    CHF (congestive heart failure), NYHA class I, acute on chronic, combined (HCC) 08/20/2023    Hypokalemia 08/14/2023    Acute diastolic heart failure (HCC) 08/12/2023    Cardiorenal syndrome 08/12/2023    Elevated liver function tests 08/12/2023    Mitral valve mass 08/12/2023    Left bundle branch block 08/12/2023    KATERYNA (acute kidney injury) (HCC) 08/12/2023    Obesity (BMI 30-39.9) 05/31/2023    Umbilical hernia without obstruction and without gangrene 05/31/2023    Normocytic anemia 05/31/2023    S/P BKA (below knee amputation) unilateral, left (HCC) 03/21/2023    Vitamin D deficiency 03/14/2023    MRSA bacteremia 03/08/2023    Tenosynovitis of left lower leg 03/06/2023     Encephalopathy 02/28/2023    Hyponatremia 02/28/2023    Generalized weakness 06/15/2022    Impaired mobility and ADLs 06/15/2022    Right foot ulcer (HCC) 06/15/2022    Chronic use of opiate drug for therapeutic purpose 01/18/2021    Chronic pain syndrome 01/18/2021    Localized osteoporosis without current pathological fracture 10/19/2020    History of osteomyelitis 08/14/2020    History of immunosuppressive therapy 08/14/2020    History of atrial fibrillation ( single episode 2019) 07/23/2020    History of cigarette smoking 07/21/2020    Aortic root dilatation (HCC) 09/13/2019    Aortic stenosis, moderate 09/12/2019    Rheumatoid arthritis (HCC) 09/08/2015    Chronic, continuous use of opioids 09/08/2015    Chronic pain of right knee 09/08/2015    History of bleeding ulcers 02/12/2015    MONICA (obstructive sleep apnea) 03/09/2010    Mood disorder (HCC) 05/21/2009    ELISEO (generalized anxiety disorder) 05/21/2009    Insomnia 05/21/2009     Prior Living Situation:  Housing / Facility: [address]  Lives with - Patient's Self Care Capacity: Spouse, Child Less than 18 Years of Age    Prior Level of Function:  Medication Management: Independent  Finances: Independent  Home Management: Independent  Shopping: Independent  Prior Level Of Mobility: Independent With Device in Community, Independent With Device in Home  Driving / Transportation: Driving Independent    Support Systems:  Primary : Katiana Hubbard (spouse)    Previous Services Utilized:   Equipment Owned: Wheelchair, Slide Board, Lower Extremity Prosthesis (L.E Prosthesis), Tub Transfer Bench, Sock Aid, Reacher, Front-Wheel Walker  Prior Services: Skilled Home Health Services    Other Information:  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 Roe (ortho) Dr. Colindres (ortho)    Patient / Family Goal:  Patient / Family  Goal:  (cm to confirm goals)    Plan:  1. Continue to follow patient through hospitalization and provide discharge planning in collaboration with patient, family, physicians and ancillary services.     2. Utilize community resources to ensure a safe discharge.

## 2023-10-30 NOTE — PROGRESS NOTES
NURSING DAILY NOTE    Name: Richard Hubbard II   Date of Admission: 10/27/2023   Admitting Diagnosis: BKA stump complication (HCC)  Attending Physician: Keaton Pisano M.d.  Allergies: Nsaids, Diphenhydramine, Mirtazapine, Penicillins, and Promethazine    Safety  Patient Assist     Patient Precautions  Fall Risk, Non Weight Bearing Right Lower Extremity, Non Weight Bearing Left Lower Extremity  Precaution Comments  1L O2 PRN  Bed Transfer Status  Contact Guard Assist  Toilet Transfer Status   Contact Guard Assist  Assistive Devices  Rails, Slide board, Wheelchair, Wheelchair push  Oxygen  Room air w/o2 available  Diet/Therapeutic Dining  Current Diet Order   Procedures    Diet Order Diet: Regular     Pill Administration  whole  Agitated Behavioral Scale     ABS Level of Severity       Fall Risk  Has the patient had a fall this admission?   No  Osiris Healy Fall Risk Scoring  9, LOW RISK  Fall Risk Safety Measures  bed alarm and chair alarm    Vitals  Temperature: 37.2 °C (98.9 °F)  Temp src: Oral  Pulse: 95  Respiration: 18  Blood Pressure: 100/64  Blood Pressure MAP (Calculated): 76 MM HG  BP Location: Left, Upper Arm  Patient BP Position: Sitting     Oxygen  Pulse Oximetry: 92 %  O2 (LPM): 1  O2 Delivery Device: Room air w/o2 available    Bowel and Bladder  Last Bowel Movement  10/29/23  Stool Type  Type 4: Like a sausage or snake, smooth and soft  Bowel Device     Continent  Bladder:  (piedra in place)   Bowel: No movement  Bladder Function  Urine Void (mL): 400 ml  Number of Times Voided: 1  Urine Color: Unable To Evaluate  Genitourinary Assessment   Bladder Assessment (WDL):  WDL Except  Piedra Catheter: Present with Active Order  Urine Color: Unable To Evaluate  Bladder Device: Urinal    Skin  Nam Score   14  Sensory Interventions   Bed Types: Standard/Trauma Mattress  Skin Preventative Measures: Pillows in Use for Support /  Positioning  Moisture Interventions  Moisturizers/Barriers: Barrier Paste, Antifungal Paste      Pain  Pain Rating Scale  7 - Focus of attention, prevents doing daily activities  Pain Location  Leg  Pain Location Orientation  Left  Pain Interventions   Medication (see MAR), Repositioned, Rest    ADLs    Bathing   Shower, * * With Assistance from, Staff  Linen Change      Personal Hygiene  Moist Charlotte Wipes, Perineal Care  Chlorhexidine Bath      Oral Care     Teeth/Dentures     Shave     Nutrition Percentage Eaten  *  * Meal *  *, Lunch, Between 50-75% Consumed  Environmental Precautions     Patient Turns/Positioning  Patient Turns Self from Side to Side  Patient Turns Assistance/Tolerance     Bed Positions     Head of Bed Elevated         Psychosocial/Neurologic Assessment  Psychosocial Assessment  Psychosocial (WDL):  WDL Except  Patient Behaviors: Anxious  Neurologic Assessment  Neuro (WDL): Within Defined Limits  Level of Consciousness: Alert  EENT (WDL):  WDL Except    Cardio/Pulmonary Assessment  Edema   Scrotal Edema: Generalized  Respiratory Breath Sounds  RUL Breath Sounds: Diminished  RML Breath Sounds: Diminished  RLL Breath Sounds: Diminished  DONALD Breath Sounds: Diminished  LLL Breath Sounds: Diminished  Cardiac Assessment   Cardiac (WDL):  WDL Except (CHF)

## 2023-10-30 NOTE — CARE PLAN
The patient is Stable - Low risk of patient condition declining or worsening      Problem: Pain - Standard  Goal: Alleviation of pain or a reduction in pain to the patient’s comfort goal  Outcome: Progressing   Note: Patient able to verbalize pain level and verbalize an acceptable level of pain.       Problem: Fall Risk - Rehab  Goal: Patient will remain free from falls  Outcome: Progressing   Osiris Healy Fall risk Assessment Score: 9    Low fall risk interventions   - Call light within reach   - Yellow  socks   - Belongings within reach   - Bed in the lowest position     Patient uses call light consistently and appropriately this shift.  Waits for assistance when needed and does not attempt self transfer.  Able to verbalize needs.  Will continue to monitor.

## 2023-10-30 NOTE — PROGRESS NOTES
"  Physical Medicine & Rehabilitation Progress Note    Encounter Date: 10/30/2023    Chief Complaint: Decreased mobility, leg pain    Interval Events (Subjective):  Patient sitting up in room. He reports he is doing OK. He reports he was told he could put his foot down with transfers. Reviewed notes and per Roya's most recent note he is touch down weight bearing on the RLE. Denies pain. Reviewed admission labs and low Vitamin D and ongoing anemia. Will recheck labs    Objective:  VITAL SIGNS: /60   Pulse 88   Temp 36.5 °C (97.7 °F) (Oral)   Resp 18   Ht 1.803 m (5' 11\")   Wt 93 kg (205 lb 0.4 oz)   SpO2 97%   BMI 28.60 kg/m²   Gen: NAD  Psych: Mood and affect appropriate  CV: RRR, 1+ RLE edema  Resp: CTAB, no upper airway sounds  Abd: NTND  Neuro: AOx4, following commands    Laboratory Values:  Recent Results (from the past 72 hour(s))   CBC with Differential    Collection Time: 10/28/23  5:38 AM   Result Value Ref Range    WBC 7.7 4.8 - 10.8 K/uL    RBC 3.53 (L) 4.70 - 6.10 M/uL    Hemoglobin 9.8 (L) 14.0 - 18.0 g/dL    Hematocrit 32.2 (L) 42.0 - 52.0 %    MCV 91.2 81.4 - 97.8 fL    MCH 27.8 27.0 - 33.0 pg    MCHC 30.4 (L) 32.3 - 36.5 g/dL    RDW 57.9 (H) 35.9 - 50.0 fL    Platelet Count 170 164 - 446 K/uL    MPV 10.0 9.0 - 12.9 fL    Neutrophils-Polys 67.00 44.00 - 72.00 %    Lymphocytes 22.10 22.00 - 41.00 %    Monocytes 6.00 0.00 - 13.40 %    Eosinophils 2.30 0.00 - 6.90 %    Basophils 0.50 0.00 - 1.80 %    Immature Granulocytes 2.10 (H) 0.00 - 0.90 %    Nucleated RBC 0.00 0.00 - 0.20 /100 WBC    Neutrophils (Absolute) 5.16 1.82 - 7.42 K/uL    Lymphs (Absolute) 1.70 1.00 - 4.80 K/uL    Monos (Absolute) 0.46 0.00 - 0.85 K/uL    Eos (Absolute) 0.18 0.00 - 0.51 K/uL    Baso (Absolute) 0.04 0.00 - 0.12 K/uL    Immature Granulocytes (abs) 0.16 (H) 0.00 - 0.11 K/uL    NRBC (Absolute) 0.00 K/uL   Comp Metabolic Panel (CMP)    Collection Time: 10/28/23  5:38 AM   Result Value Ref Range    Sodium 136 " 135 - 145 mmol/L    Potassium 4.7 3.6 - 5.5 mmol/L    Chloride 100 96 - 112 mmol/L    Co2 28 20 - 33 mmol/L    Anion Gap 8.0 7.0 - 16.0    Glucose 77 65 - 99 mg/dL    Bun 16 8 - 22 mg/dL    Creatinine 0.91 0.50 - 1.40 mg/dL    Calcium 8.1 (L) 8.5 - 10.5 mg/dL    Correct Calcium 9.1 8.5 - 10.5 mg/dL    AST(SGOT) 9 (L) 12 - 45 U/L    ALT(SGPT) <5 2 - 50 U/L    Alkaline Phosphatase 98 30 - 99 U/L    Total Bilirubin 0.2 0.1 - 1.5 mg/dL    Albumin 2.7 (L) 3.2 - 4.9 g/dL    Total Protein 6.1 6.0 - 8.2 g/dL    Globulin 3.4 1.9 - 3.5 g/dL    A-G Ratio 0.8 g/dL   HEMOGLOBIN A1C    Collection Time: 10/28/23  5:38 AM   Result Value Ref Range    Glycohemoglobin 4.7 4.0 - 5.6 %    Est Avg Glucose 88 mg/dL   TSH with Reflex to FT4    Collection Time: 10/28/23  5:38 AM   Result Value Ref Range    TSH 9.280 (H) 0.380 - 5.330 uIU/mL   Vitamin D, 25-hydroxy (blood)    Collection Time: 10/28/23  5:38 AM   Result Value Ref Range    25-Hydroxy   Vitamin D 25 16 (L) 30 - 100 ng/mL   ESTIMATED GFR    Collection Time: 10/28/23  5:38 AM   Result Value Ref Range    GFR (CKD-EPI) 104 >60 mL/min/1.73 m 2   FREE THYROXINE    Collection Time: 10/28/23  5:38 AM   Result Value Ref Range    Free T-4 0.95 0.93 - 1.70 ng/dL       Medications:  Scheduled Medications   Medication Dose Frequency    vitamin D3  2,000 Units DAILY    miconazole   BID    Pharmacy Consult Request  1 Each PHARMACY TO DOSE    senna-docusate  2 Tablet BID    ceFAZolin  2 g Q8HRS    DULoxetine  60 mg DAILY    enoxaparin (LOVENOX) injection  40 mg DAILY AT 1800    morphine  30 mg Q6HRS    morphine ER  15 mg Q12HRS    pregabalin  150 mg TID    omeprazole  20 mg BID    normal saline (PF)  20 mL Q12HRS     PRN medications: Respiratory Therapy Consult, hydrALAZINE, acetaminophen, senna-docusate **AND** polyethylene glycol/lytes **AND** magnesium hydroxide **AND** bisacodyl, mag hydrox-al hydrox-simeth, ondansetron **OR** ondansetron, traZODone, sodium chloride, oxyCODONE  immediate-release **OR** oxyCODONE immediate-release, traMADol, zolpidem    Diet:  Current Diet Order   Procedures    Diet Order Diet: Regular       Medical Decision Making and Plan:  BKA abscess - Patient s/p I&D and Revision for abscess and possible osteomyelitis.   -PT and OT for mobility and ADLs. Per guidelines, 15 hours per week between PT, OT and/or SLP.  -Follow-up Orthopedics. NWB LLE, touch down weight bearing RLE for charcot joint     MSSA bacteremia - Patient on Ancef 2g q8 until 11/19/23 per ID.      Anemia - Check AM CBC - 9.8, will recheck 10/31/23 w Fe panel     Thrombocytopenia  - Check AM CBC - 170, monitor       Depression - Patient on Cymbalta      Neuropathy - Patient on Cymbalta 60 mg and Lyrica 150 mg      Pain - Patient on PRN Tylenol, Oxycodone, Lyrica 150 mg TID, and Morphine 30 mg q6     Skin - Patient at risk for skin breakdown due to debility in areas including sacrum, achilles, elbows and head in addition to other sites. Nursing to assess skin daily.      Vitamin D deficiency -16 on admission, start 2000 U     Insomnia - On Ambien 10 mg daily    GI Ppx - Patient on Prilosec for GERD prophylaxis. Patient on Senna-docusate for constipation prophylaxis.      DVT Ppx - Patient Lovenox on transfer.   ____________________________________    T. Loi Pisano MD/PhD  [facility], [place] - Physical Medicine & Rehabilitation   [facility] [place] - Brain Injury Medicine   ____________________________________    Total time:  50 minutes. Time spent included pre-rounding review of vitals and tests, unit/floor time, face-to-face time with the patient including physical examination, care coordination, counseling of patient and/or family, ordering medications/procedures/tests, discussion with CM, PT, OT, SLP and/or other healthcare providers, and documentation in the electronic medical record. Topics discussed included admission labs, anemia, check Fe panel, vitamin D deficiency, and weight bearing precautions. Patient was  discussed separately in IDT today; please see details above.

## 2023-10-30 NOTE — THERAPY
"Occupational Therapy  Daily Treatment     Patient Name: Richard Hubbard II  Age:  48 y.o., Sex:  male  Medical Record #: [id number]  Today's Date: 10/30/2023     Precautions  Precautions: Fall Risk, Non Weight Bearing Left Lower Extremity, Other (See Comments)  Comments: Touchdown WB R LE, per surgeon's most recent note         Subjective    \"Let's do some UB strengthening today.\"      Objective       10/30/23 1431   OT Charge Group   OT Therapeutic Exercise (Units) 2   OT Total Time Spent   OT Individual Total Time Spent (Mins) 30   Functional Level of Assist   Bed, Chair, Wheelchair Transfer Supervised   Bed Chair Wheelchair Transfer Description Increased time;Initial preparation for task;Set-up of equipment;Slideboard transfer from bed to wheelchair   Sitting Upper Body Exercises   Tricep Press Bilateral;Weight (See Comments for lbs)  (25 lbs; 5 sets x 10 forward and backward)   Comments pt engaged in w/c mobility to/from room to back gym using BUE only   Bed Mobility    Supine to Sit Modified Independent   Scooting Modified Independent   Interdisciplinary Plan of Care Collaboration   Patient Position at End of Therapy Seated;Self Releasing Lap Belt Applied  (wheeled himself back to room)         Assessment    Pt tolerated session well. Pt wanting to focus on UB strengthening this session. Pt able to complete SB xfer with supervision but reported wanting to be able to stand pivot xfer- still waiting on clarification from the doctor if he is able to WB on RLE.     Strengths: Able to follow instructions, Adequate strength, Independent prior level of function, Motivated for self care and independence, Pleasant and cooperative, Willingly participates in therapeutic activities, Supportive family  Barriers: Decreased endurance, Fatigue, Generalized weakness, Impaired activity tolerance, Impaired balance, Limited mobility, Pain    Plan    ADLs, IADLs, safety with transfers, strength/endurance    DME   "       Occupational Therapy Goals (Active)       Problem: Bathing       Dates: Start:  10/28/23         Goal: STG-Within one week, patient will bathe w/ supervision and DME as needed       Dates: Start:  10/28/23               Problem: Dressing       Dates: Start:  10/28/23         Goal: STG-Within one week, patient will complete full dressing routine w/ SBA and AE as needed       Dates: Start:  10/28/23               Problem: IADL's       Dates: Start:  10/28/23         Goal: STG-Within one week, patient will prepare a meal at w/c level w/ SBA       Dates: Start:  10/28/23            Goal: STG-Within one week, patient will utilize washer and dryer at w/c level w/ SBA and AE as needed       Dates: Start:  10/28/23               Problem: OT Long Term Goals       Dates: Start:  10/28/23         Goal: LTG-By discharge, patient will complete basic self care tasks w/ mod I and AE/DME as needed       Dates: Start:  10/28/23            Goal: LTG-By discharge, patient will complete basic home management w/ mod I and LRAD as needed       Dates: Start:  10/28/23               Problem: Toileting       Dates: Start:  10/28/23         Goal: STG-Within one week, patient will complete toileting tasks w/ SBA and DME as needed       Dates: Start:  10/28/23

## 2023-10-30 NOTE — THERAPY
Physical Therapy   Daily Treatment     Patient Name: Richard Hubbard II  Age:  48 y.o., Sex:  male  Medical Record #: [id number]  Today's Date: 10/30/2023     Precautions  Precautions: Fall Risk, Non Weight Bearing Left Lower Extremity, Other (See Comments)  Comments: Touchdown WB R LE, per surgeon's most recent note    Subjective    Pt willing to participate     Objective       10/30/23 0831   PT Charge Group   PT Therapeutic Exercise (Units) 3   PT Therapeutic Activities (Units) 1   Supervising Physical Therapist Kate Bryant   PT Total Time Spent   PT Individual Total Time Spent (Mins) 60   Pain 0 - 10 Group   Location Generalized   Gait Functional Level of Assist    Gait Level Of Assist Unable to Participate   Wheelchair Functional Level of Assist   Wheelchair Assist Supervised   Distance Wheelchair (Feet or Distance) 150   Wheelchair Description Extra time;Leg rest management;Limited by fatigue;Supervision for safety   Stairs Functional Level of Assist   Level of Assist with Stairs Unable to Participate   # of Stairs Climbed 0   Stairs Description Safety concerns   Transfer Functional Level of Assist   Bed, Chair, Wheelchair Transfer Contact Guard Assist   Bed Chair Wheelchair Transfer Description Slideboard transfer from bed to wheelchair;Increased time;Supervision for safety;Verbal cueing   Supine Lower Body Exercise   Supine Lower Body Exercises Yes  (prone)   Other Exercises see note   Bed Mobility    Sit to Supine Standby Assist   Sit to Stand Unable to Participate   Scooting Standby Assist   Neuro-Muscular Treatments   Neuro-Muscular Treatments Verbal Cuing;Tactile Cuing   Interdisciplinary Plan of Care Collaboration   IDT Collaboration with  Occupational Therapist;Physical Therapist   Patient Position at End of Therapy Seated;Chair Alarm On;Call Light within Reach;Tray Table within Reach   Collaboration Comments wbing statis for R LE         Therapeutic exercise in prone  I's T's Y's 3 x 10  B LE  knee flexion 3 x 10  Prone hip flexor stretch x 20' during exercise program    Assessment    Pt tolerated tx session well focusing on progression of SB transfer and generalized strengthening. Requires vc/tc for proper form and control with exercises  Strengths: Independent prior level of function, Motivated for self care and independence, Supportive family, Willingly participates in therapeutic activities, Alert and oriented  Barriers: Bladder retention, Decreased endurance, Impaired activity tolerance, Impaired balance, Impulsive, Limited mobility, Pain    Plan    Confirm wbing statis for R LE  Generalized strengthening and endurance  BKA exercises, progress independence with transfers  Discuss home entry and car transfer     DME       Passport items to be completed:  Get in/out of bed safely, in/out of a vehicle, safely use mobility device, walk or wheel around home/community, navigate up and down stairs, show how to get up/down from the ground, ensure home is accessible, demonstrate HEP, complete caregiver training    Physical Therapy Problems (Active)       Problem: Mobility       Dates: Start:  10/28/23         Goal: STG-Within one week, patient will propel wheelchair community 200ft including 3% grade Adore       Dates: Start:  10/28/23               Problem: Mobility Transfers       Dates: Start:  10/28/23         Goal: STG-Within one week, patient will perform bed mobility independently       Dates: Start:  10/28/23            Goal: STG-Within one week, patient will transfer bed to chair with SBA while maintaining Wbing precautions       Dates: Start:  10/28/23               Problem: PT-Long Term Goals       Dates: Start:  10/28/23         Goal: LTG-By discharge, patient will propel wheelchair 300ft indoors and level outdoors Alfonzo       Dates: Start:  10/28/23            Goal: LTG-By discharge, patient will transfer one surface to another with set up assist while maintaining Wbing precautions       Dates:  Start:  10/28/23            Goal: LTG-By discharge, patient will transfer in/out of a car with modA       Dates: Start:  10/28/23

## 2023-10-31 NOTE — THERAPY
Occupational Therapy  Daily Treatment     Patient Name: Richard Hubbard II  Age:  48 y.o., Sex:  male  Medical Record #: [id number]  Today's Date: 10/31/2023     Precautions  Precautions: (P) Fall Risk, Non Weight Bearing Left Lower Extremity, Other (See Comments)  Comments: (P) Touchdown WB R LE         Subjective    Patient was seated in w/c and agreeable to shower.       Objective       10/31/23 1031   OT Charge Group   OT Self Care / ADL (Units) 3   OT Therapy Activity (Units) 1   OT Total Time Spent   OT Individual Total Time Spent (Mins) 60   Precautions   Precautions Fall Risk;Non Weight Bearing Left Lower Extremity;Other (See Comments)   Comments Touchdown WB R LE   Pain 0 - 10 Group   Location Generalized   Therapist Pain Assessment During Activity   Functional Level of Assist   Grooming Modified Independent;Seated   Bathing Supervision   Bathing Description Grab bar;Hand held shower;Tub bench;Set-up of equipment;Supervision for safety;Set up for wound protection  (setup for bilateral wound protection)   Upper Body Dressing Modified Independent   Upper Body Dressing Description Assist device equipment  (wc)   Lower Body Dressing Standby Assist   Lower Body Dressing Description Set-up of equipment;Supervision for safety  (doffed clothing on shower bench, donned on w/c and on bed)   Bed, Chair, Wheelchair Transfer Standby Assist   Bed Chair Wheelchair Transfer Description Set-up of equipment;Slideboard transfer from bed to wheelchair;Supervision for safety;Verbal cueing  (w/c <> bed with slideboard)   Tub / Shower Transfers Contact Guard Assist   Tub Shower Transfer Description Grab bar;Shower bench;Set-up of equipment;Supervision for safety;Verbal cueing;Adaptive equipment  (slideboard)   Interdisciplinary Plan of Care Collaboration   Patient Position at End of Therapy Seated;Call Light within Reach;Tray Table within Reach;Phone within Reach         Assessment    Patient completed adl routine with setup  and SBA using AD/AE.    Strengths: Able to follow instructions, Adequate strength, Independent prior level of function, Motivated for self care and independence, Pleasant and cooperative, Willingly participates in therapeutic activities, Supportive family  Barriers: Decreased endurance, Fatigue, Generalized weakness, Impaired activity tolerance, Impaired balance, Limited mobility, Pain    Plan    ADLs, IADLs, safety with transfers, strength/endurance    Occupational Therapy Goals (Active)       Problem: Bathing       Dates: Start:  10/28/23         Goal: STG-Within one week, patient will bathe w/ supervision and DME as needed       Dates: Start:  10/28/23    Expected End:  11/17/23               Problem: Dressing       Dates: Start:  10/28/23         Goal: STG-Within one week, patient will complete full dressing routine w/ SBA and AE as needed       Dates: Start:  10/28/23    Expected End:  11/17/23               Problem: IADL's       Dates: Start:  10/28/23         Goal: STG-Within one week, patient will prepare a meal at w/c level w/ SBA       Dates: Start:  10/28/23    Expected End:  11/17/23            Goal: STG-Within one week, patient will utilize washer and dryer at w/c level w/ SBA and AE as needed       Dates: Start:  10/28/23    Expected End:  11/17/23               Problem: OT Long Term Goals       Dates: Start:  10/28/23         Goal: LTG-By discharge, patient will complete basic self care tasks w/ mod I and AE/DME as needed       Dates: Start:  10/28/23    Expected End:  11/17/23            Goal: LTG-By discharge, patient will complete basic home management w/ mod I and LRAD as needed       Dates: Start:  10/28/23    Expected End:  11/17/23               Problem: Toileting       Dates: Start:  10/28/23         Goal: STG-Within one week, patient will complete toileting tasks w/ SBA and DME as needed       Dates: Start:  10/28/23    Expected End:  11/17/23

## 2023-10-31 NOTE — THERAPY
"Occupational Therapy  Daily Treatment     Patient Name: Richard Hubbard II  Age:  48 y.o., Sex:  male  Medical Record #: [id number]  Today's Date: 10/31/2023     Precautions  Precautions: Fall Risk, Non Weight Bearing Left Lower Extremity, Other (See Comments)  Comments: Touchdown WB R LE         Subjective    \"I will be getting a surgery on this foot, and yes, I won't be able to put weight through it then.\" Per pt when discussing TTWB on RLE.      Objective       10/31/23 0901   OT Charge Group   OT Self Care / ADL (Units) 2   OT Total Time Spent   OT Individual Total Time Spent (Mins) 30   Cognition    Level of Consciousness Alert   Functional Level of Assist   Toileting Contact Guard Assist   Toileting Description Grab bar;Set-up of equipment;Supervision for safety;Verbal cueing  (CGA for balance with clothing management and cues to scoot back on bench for safety during hip hike with lateral leaning.)   Bed, Chair, Wheelchair Transfer Contact Guard Assist   Bed Chair Wheelchair Transfer Description Set-up of equipment;Slideboard transfer from bed to wheelchair;Supervision for safety;Verbal cueing  (EOB>w/c with slideboard)   Toilet Transfers Minimal Assist   Toilet Transfer Description Adaptive equipment;Set-up of equipment;Increased time;Supervision for safety;Verbal cueing  (CGA for w/c stabilization onto drop arm commode over toilet with dycem on SB. Min A for exit (down hill) due to shorts getting caught on board.)   Interdisciplinary Plan of Care Collaboration   IDT Collaboration with  Occupational Therapist;Physical Therapist   Patient Position at End of Therapy Seated;Chair Alarm On;Self Releasing Lap Belt Applied;Call Light within Reach;Tray Table within Reach   Collaboration Comments Primary for CLOF, POC         Assessment    Pt with fair tolerance to transfer training and toileting this day, with noted SOB and fatigue with transitional movements. Pt able to complete at grossly CGA level, but " required Min A for transfer off toilet due to clothing.  Strengths: Able to follow instructions, Adequate strength, Independent prior level of function, Motivated for self care and independence, Pleasant and cooperative, Willingly participates in therapeutic activities, Supportive family  Barriers: Decreased endurance, Fatigue, Generalized weakness, Impaired activity tolerance, Impaired balance, Limited mobility, Pain    Plan    ADLs, IADLs, safety with transfers, strength/endurance    Occupational Therapy Goals (Active)       Problem: IADL's       Dates: Start:  10/28/23         Goal: STG-Within one week, patient will prepare a meal at w/c level w/ SBA       Dates: Start:  10/28/23    Expected End:  11/17/23         Goal Note filed on 10/31/23 1151 by Binu Keen, OT/L       Not yet attempted              Goal: STG-Within one week, patient will utilize washer and dryer at w/c level w/ SBA and AE as needed       Dates: Start:  10/28/23    Expected End:  11/17/23         Goal Note filed on 10/31/23 1151 by Binu Keen, OT/L       Not yet attempted                 Problem: OT Long Term Goals       Dates: Start:  10/28/23         Goal: LTG-By discharge, patient will complete basic self care tasks w/ mod I and AE/DME as needed       Dates: Start:  10/28/23    Expected End:  11/17/23            Goal: LTG-By discharge, patient will complete basic home management w/ mod I and LRAD as needed       Dates: Start:  10/28/23    Expected End:  11/17/23               Problem: Toileting       Dates: Start:  10/28/23         Goal: STG-Within one week, patient will complete toileting tasks w/ SBA and DME as needed       Dates: Start:  10/28/23    Expected End:  11/17/23         Goal Note filed on 10/31/23 1151 by Binu Keen, OT/L       CGA

## 2023-10-31 NOTE — CARE PLAN
Problem: Mobility  Goal: STG-Within one week, patient will propel wheelchair community 200ft including 3% grade Adore  Outcome: Not Met  Note: NT outdoor and w/c ramps     Problem: Mobility Transfers  Goal: STG-Within one week, patient will transfer bed to chair with SBA while maintaining Wbing precautions  Outcome: Not Met  Note: Requires CGA with slideboard transfers     Problem: Mobility Transfers  Goal: STG-Within one week, patient will perform bed mobility independently  Outcome: Met

## 2023-10-31 NOTE — PROGRESS NOTES
Patient is at [facility] and plans for discharge on 11/3/2023 with continued IV antibiotics.  Orders written for home infusion and uploaded into epic under media tab.

## 2023-10-31 NOTE — DOCUMENTATION QUERY
DOCUMENTATION QUERY    PROVIDERS: Please select “Cosign w/ note”to reply to query.    To better represent the severity of illness of your patient, please review the following information and exercise your independent professional judgment in responding to this query.     CHF is documented in the History and Physical. Based upon the clinical findings, risk factors, and treatment, can this diagnosis be further specified?    Acute Systolic heart failure   Chronic Systolic heart failure  Acute on Chronic Systolic heart failure  Acute Diastolic heart failure   Chronic Diastolic heart failure  Acute on Chronic Diastolic heart failure  Acute Systolic and Diastolic heart failure  Chronic Systolic and Diastolic heart failure  Acute on Chronic Systolic and diastolic heart failure  Other explanation of clinical findings  Unable to determine         The medical record reflects the following:   Clinical Findings  H&P: Past medical history of CHF    ECHO:  CONCLUSIONS 10/23/2023  Prior study on 8/12/2023, compared to the report of the prior study,   there has been no significant change.   Normal left ventricular systolic function.  The left ventricular ejection fraction is visually estimated to be 55%.  Mild mitral regurgitation.  Heavily calcified aortic valve, possibly a bicuspid.  Moderate aortic valve stenosis Vmax is 3.57 m/s.  Aortic valve area calculated from the continuity equation is 1.2 cm2.  Vmax is 3.57 m/s. Transvalvular gradients are - Peak: 50 mmHg,  Mean:   28 mmHg.   Moderate aortic insufficiency.  Mild tricuspid regurgitation.  Estimated right ventricular systolic pressure is 45 mmHg.       Discharge planning note 10/30/23:   Chronic diastolic heart failure with preserved ejection fraction (HCC) 08/21/2023    CHF (congestive heart failure), NYHA class I, acute on chronic, combined (HCC) 08/20/2023      Treatment  Lasix 20 mg per screening form   Risk Factors  MSSA Bacteremia, anemia, thrombocytopenia   Location  within medical record  History and Physical, discharge planning note, screening form     Thank you,   [email or web address]

## 2023-10-31 NOTE — PROGRESS NOTES
NURSING DAILY NOTE    Name: Richard Hubbard II   Date of Admission: 10/27/2023   Admitting Diagnosis: BKA stump complication (HCC)  Attending Physician: Keaton Pisano M.d.  Allergies: Nsaids, Diphenhydramine, Mirtazapine, Penicillins, and Promethazine    Safety  Patient Assist     Patient Precautions  Fall Risk, Non Weight Bearing Left Lower Extremity, Other (See Comments)  Precaution Comments  Touchdown WB R LE, per surgeon's most recent note  Bed Transfer Status  Contact Guard Assist  Toilet Transfer Status   Contact Guard Assist  Assistive Devices  Rails, Wheelchair  Oxygen  None - Room Air  Diet/Therapeutic Dining  Current Diet Order   Procedures    Diet Order Diet: Regular     Pill Administration  whole  Agitated Behavioral Scale     ABS Level of Severity       Fall Risk  Has the patient had a fall this admission?   No  Osiris Healy Fall Risk Scoring  9, LOW RISK  Fall Risk Safety Measures  bed alarm and chair alarm    Vitals  Temperature: 36.5 °C (97.7 °F)  Temp src: Oral  Pulse: 96  Respiration: 18  Blood Pressure: 129/75  Blood Pressure MAP (Calculated): 93 MM HG  BP Location: Right, Upper Arm  Patient BP Position: Sitting     Oxygen  Pulse Oximetry: 97 %  O2 (LPM): 1  O2 Delivery Device: None - Room Air    Bowel and Bladder  Last Bowel Movement  10/30/23  Stool Type  Type 3: Like a sausage, but with cracks on its surface  Bowel Device     Continent  Bladder:  (piedra in place)   Bowel: No movement  Bladder Function  Urine Void (mL): 700 ml  Number of Times Voided: 1  Urine Color: Yellow  Genitourinary Assessment   Bladder Assessment (WDL):  WDL Except  Piedra Catheter: Present with Active Order  Urine Color: Yellow  Bladder Device: Urinal  Bladder Scan: Post Void  $ Bladder Scan Results (mL): 117    Skin  Nam Score   14  Sensory Interventions   Bed Types: Standard/Trauma Mattress  Skin Preventative Measures: Pillows in Use for  Support / Positioning  Moisture Interventions  Moisturizers/Barriers: Antifungal Paste      Pain  Pain Rating Scale  4 - Distracts me, can do usual activities  Pain Location  Generalized  Pain Location Orientation  Left  Pain Interventions   Medication (see MAR)    ADLs    Bathing   Shower, * * With Assistance from, Staff  Linen Change   Partial  Personal Hygiene  Moist Charlotte Wipes, Perineal Care  Chlorhexidine Bath      Oral Care     Teeth/Dentures     Shave     Nutrition Percentage Eaten  Lunch, Between % Consumed  Environmental Precautions     Patient Turns/Positioning  Patient Turns Self from Side to Side  Patient Turns Assistance/Tolerance     Bed Positions     Head of Bed Elevated         Psychosocial/Neurologic Assessment  Psychosocial Assessment  Psychosocial (WDL):  WDL Except  Patient Behaviors: Anxious  Neurologic Assessment  Neuro (WDL): Within Defined Limits  Level of Consciousness: Alert  EENT (WDL):  WDL Except    Cardio/Pulmonary Assessment  Edema   Scrotal Edema: Generalized  Respiratory Breath Sounds  RUL Breath Sounds: Diminished  RML Breath Sounds: Diminished  RLL Breath Sounds: Diminished  DONALD Breath Sounds: Diminished  LLL Breath Sounds: Diminished  Cardiac Assessment   Cardiac (WDL):  WDL Except (CHF)

## 2023-10-31 NOTE — DOCUMENTATION QUERY
DOCUMENTATION QUERY    PROVIDERS: Please select “Cosign w/ note”to reply to query.    To better represent the severity of illness of your patient, please review the following information and exercise your independent professional judgment in responding to this query.     BKA abscess is documented in the History and Physical. Based upon the clinical findings, risk factors, and treatment, please specify if this condition is still being treated during the rehab stay.    [[Osteomyelitis is being treated during the rehab stay.]]  [[Osteomyelitis is not being treated during the rehab stay.]]  [[Other explanation]]  [[Unable to determine]]      The medical record reflects the following:   Clinical Findings Per H&P:  BKA abscess - Patient s/p I&D and Revision for abscess and possible osteomyelitis. Patient is NWB BLE  -PT and OT for mobility and ADLs. Per guidelines, 15 hours per week between PT, OT and/or SLP.  -Follow-up Orthopedics    Per Pre-admit Screening Form:   Severe sepsis from left lower ext stump infection and abscess and likely osteo. Orthopedic consultation, s/p vanco, continue linezolid and zosyn. If blood cx + recommend repeat echo to rule out endocarditis with + murmur and restarting vancomycin. Follow up cultures and wound data. Carefull volume expansion with heart failure history and use pressors with norepinephrine with map >65. Check viral swab. Check cortisol level with hx of chronic steroids and if develops shock would start stress dose steroids.     Per H&P:  MSSA bacteremia - Patient on Ancef 2g q8 until 11/19/23 per ID   Treatment Ancef 2g q8 until 11/19/23 per ID   Risk Factors  MSSA bacteremia   Location within medical record  History and Physical,  screening form     Thank you,   Majo Herring, [email or web address]

## 2023-10-31 NOTE — PROGRESS NOTES
NURSING DAILY NOTE    Name: Richard Hubbard II   Date of Admission: 10/27/2023   Admitting Diagnosis: BKA stump complication (HCC)  Attending Physician: Keaton Pisano M.d.  Allergies: Nsaids, Diphenhydramine, Mirtazapine, Penicillins, and Promethazine    Safety  Patient Assist     Patient Precautions  Fall Risk, Non Weight Bearing Left Lower Extremity, Other (See Comments)  Precaution Comments  Touchdown WB R LE, per surgeon's most recent note  Bed Transfer Status  Contact Guard Assist  Toilet Transfer Status   Contact Guard Assist  Assistive Devices  Rails, Wheelchair  Oxygen  None - Room Air  Diet/Therapeutic Dining  Current Diet Order   Procedures    Diet Order Diet: Regular     Pill Administration  whole  Agitated Behavioral Scale     ABS Level of Severity       Fall Risk  Has the patient had a fall this admission?   No  Osiris Healy Fall Risk Scoring  9, LOW RISK  Fall Risk Safety Measures  bed alarm, chair alarm, and poor balance    Vitals  Temperature: 36.9 °C (98.4 °F)  Temp src: Oral  Pulse: 89  Respiration: 18  Blood Pressure: 103/60  Blood Pressure MAP (Calculated): 74 MM HG  BP Location: Right, Upper Arm  Patient BP Position: Supine     Oxygen  Pulse Oximetry: 98 %  O2 (LPM): 1  O2 Delivery Device: None - Room Air    Bowel and Bladder  Last Bowel Movement  10/29/23  Stool Type  Type 4: Like a sausage or snake, smooth and soft  Bowel Device     Continent  Bladder:  (piedra in place)   Bowel: No movement  Bladder Function  Urine Void (mL): 300 ml  Number of Times Voided: 1  Urine Color: Melonie  Genitourinary Assessment   Bladder Assessment (WDL):  WDL Except  Piedra Catheter: Present with Active Order  Urine Color: Melonie  Bladder Device: Urinal  Bladder Scan: Post Void  $ Bladder Scan Results (mL): 98    Skin  Nam Score   14  Sensory Interventions   Bed Types: Standard/Trauma Mattress  Skin Preventative Measures: Pillows in Use for  Support / Positioning  Moisture Interventions  Moisturizers/Barriers: Antifungal Paste, Barrier Cream      Pain  Pain Rating Scale  7 - Focus of attention, prevents doing daily activities  Pain Location  Generalized  Pain Location Orientation  Left  Pain Interventions   Medication (see MAR), Repositioned, Rest    ADLs    Bathing   Shower, * * With Assistance from, Staff  Linen Change   Partial  Personal Hygiene  Moist Charlotte Wipes, Perineal Care  Chlorhexidine Bath      Oral Care     Teeth/Dentures     Shave     Nutrition Percentage Eaten  Lunch, Between % Consumed  Environmental Precautions     Patient Turns/Positioning  Patient Turns Self from Side to Side  Patient Turns Assistance/Tolerance     Bed Positions     Head of Bed Elevated         Psychosocial/Neurologic Assessment  Psychosocial Assessment  Psychosocial (WDL):  WDL Except  Patient Behaviors: Anxious  Neurologic Assessment  Neuro (WDL): Within Defined Limits  Level of Consciousness: Alert  EENT (WDL):  WDL Except    Cardio/Pulmonary Assessment  Edema   Scrotal Edema: Generalized  Respiratory Breath Sounds  RUL Breath Sounds: Diminished  RML Breath Sounds: Diminished  RLL Breath Sounds: Diminished  DONALD Breath Sounds: Diminished  LLL Breath Sounds: Diminished  Cardiac Assessment   Cardiac (WDL):  WDL Except (CHF)

## 2023-10-31 NOTE — DISCHARGE PLANNING
Case Management/IDT follow up.   IDT continues to recommend IRF level of care as patient continue to make progress with all therapies.   Projected dc date set for 11/3/     DC needs: home ivab; home health for PT/OT/RN; follow up w/ pcp, surgeon, and id   Pt has all dme @ home.   Met w/ pt providing update. No preference on agencies for home ivab.    Plan:  Dc date 11/3.      Addendum 11/1  [facility] as pt has no choice/no preference.

## 2023-10-31 NOTE — THERAPY
Physical Therapy   Daily Treatment     Patient Name: Richard Hubbard II  Age:  48 y.o., Sex:  male  Medical Record #: [id number]  Today's Date: 10/31/2023     Precautions  Precautions: Fall Risk, Non Weight Bearing Left Lower Extremity, Toe Touch Weight Bearing Right Lower Extremity  Comments: Touchdown WB R LE    Subjective    Discussed conserving right LE WB for unlevel transfers (car); otherwise best to perform lateral scoot and protect right foot wound; acknowledges that he is waiting for surgery on right foot and will need to know how to SB for transfers for 4-6months after right foot surgery; plans to use van for vehicle at discharge- plans to SPT into van    asking for Saturday discharge for transportation arrangements      Objective       10/31/23 1401   PT Charge Group   PT Therapeutic Exercise (Units) 1   PT Therapeutic Activities (Units) 3   PT Total Time Spent   PT Individual Total Time Spent (Mins) 60   Precautions   Precautions Fall Risk;Non Weight Bearing Left Lower Extremity;Toe Touch Weight Bearing Right Lower Extremity   Wheelchair Functional Level of Assist   Wheelchair Assist Minimal Assist  (mod I indoors, outdoors- SBA on sidewalks, min assist ramps)   Transfer Functional Level of Assist   Bed, Chair, Wheelchair Transfer Standby Assist  (lateral scoot (not using SB))   Interdisciplinary Plan of Care Collaboration   IDT Collaboration with  Physical Therapist   Collaboration Comments treatment planning       Assessment    Biggest mobility challenge is min assist on outdoor ramps    Strengths: Independent prior level of function, Motivated for self care and independence, Supportive family, Willingly participates in therapeutic activities, Alert and oriented  Barriers: Bladder retention, Decreased endurance, Impaired activity tolerance, Impaired balance, Impulsive, Limited mobility, Pain    Plan    Transfers to elevated mat to simulate van transfers  Outdoor w/c skills  Review amputee HEP-  prosthetic prep, LE ROM/strengthening program      DME-  Slideboard- 30in for car transfers     Passport items to be completed:  Get in/out of bed safely, in/out of a vehicle, safely use mobility device, walk or wheel around home/community, navigate up and down stairs, show how to get up/down from the ground, ensure home is accessible, demonstrate HEP, complete caregiver training    Physical Therapy Problems (Active)       Problem: Mobility       Dates: Start:  10/28/23         Goal: STG-Within one week, patient will propel wheelchair community 200ft including 3% grade Adore       Dates: Start:  10/28/23    Expected End:  11/17/23         Goal Note filed on 10/31/23 1246 by MARIBEL Haile       NT outdoor and w/c ramps                 Problem: Mobility Transfers       Dates: Start:  10/28/23         Goal: STG-Within one week, patient will transfer bed to chair with SBA while maintaining Wbing precautions       Dates: Start:  10/28/23    Expected End:  11/17/23         Goal Note filed on 10/31/23 1246 by MARIBEL Haile       Requires CGA with slideboard transfers                 Problem: PT-Long Term Goals       Dates: Start:  10/28/23         Goal: LTG-By discharge, patient will propel wheelchair 300ft indoors and level outdoors Alfonzo       Dates: Start:  10/28/23    Expected End:  11/17/23            Goal: LTG-By discharge, patient will transfer one surface to another with set up assist while maintaining Wbing precautions       Dates: Start:  10/28/23    Expected End:  11/17/23            Goal: LTG-By discharge, patient will transfer in/out of a car with modA       Dates: Start:  10/28/23    Expected End:  11/17/23

## 2023-10-31 NOTE — CARE PLAN
Problem: Toileting  Goal: STG-Within one week, patient will complete toileting tasks w/ SBA and DME as needed  Outcome: Not Met  Note: CGA     Problem: IADL's  Goal: STG-Within one week, patient will prepare a meal at w/c level w/ SBA  Outcome: Not Met  Note: Not yet attempted  Goal: STG-Within one week, patient will utilize washer and dryer at w/c level w/ SBA and AE as needed  Outcome: Not Met  Note: Not yet attempted     Problem: Bathing  Goal: STG-Within one week, patient will bathe w/ supervision and DME as needed  Outcome: Met  Note: Setup and supervised     Problem: Dressing  Goal: STG-Within one week, patient will complete full dressing routine w/ SBA and AE as needed  Outcome: Met  Note: SBA in w/c and on bed

## 2023-10-31 NOTE — CARE PLAN
The patient is Stable - Low risk of patient condition declining or worsening    Problem: Knowledge Deficit - Standard  Goal: Patient and family/care givers will demonstrate understanding of plan of care, disease process/condition, diagnostic tests and medications  Outcome: Progressing. Reviewed POC, all questions answered.        Problem: Pain - Standard  Goal: Alleviation of pain or a reduction in pain to the patient’s comfort goal  Outcome: Progressing. Medicated per MAR and repositioned for comfort.       Shift Goals  Clinical Goals: Safety  Patient Goals: Participate in therapy  Family Goals: JI

## 2023-10-31 NOTE — THERAPY
Physical Therapy   Daily Treatment     Patient Name: Richard Hubbard II  Age:  48 y.o., Sex:  male  Medical Record #: [id number]  Today's Date: 10/31/2023     Precautions  Precautions: (P) Fall Risk, Non Weight Bearing Left Lower Extremity, Other (See Comments)  Comments: (P) Touchdown WB R LE, per surgeon's most recent note    Subjective    Pt reported feeling sad to miss Halloween with his kids after receiving a picture of them in costumes, but was agreeable to participate.      Objective       10/31/23 0963   PT Charge Group   PT Therapeutic Activities (Units) 2   PT Total Time Spent   PT Individual Total Time Spent (Mins) 30   Precautions   Precautions Fall Risk;Non Weight Bearing Left Lower Extremity;Other (See Comments)   Comments Touchdown WB R LE, per surgeon's most recent note   Gait Functional Level of Assist    Gait Level Of Assist Unable to Participate  (WB restrictions)   Wheelchair Functional Level of Assist   Wheelchair Assist Supervised   Distance Wheelchair (Feet or Distance) 150   Wheelchair Description Adaptive equipment   Transfer Functional Level of Assist   Bed, Chair, Wheelchair Transfer Contact Guard Assist   Bed Chair Wheelchair Transfer Description Slideboard transfer from bed to wheelchair;Set-up of equipment;Non-hospital bed;Increased time;Adaptive equipment  (SB transfer CGA/SBA uphill, SBA downhill, board set up asist)   Supine Lower Body Exercise   Bridges Two Legged;3 sets of 10   Bed Mobility    Supine to Sit Modified Independent   Sit to Supine Modified Independent   Scooting Modified Independent   Rolling Modified Independent   Neuro-Muscular Treatments   Neuro-Muscular Treatments Weight Shift Left;Weight Shift Right;Verbal Cuing;Sequencing   Interdisciplinary Plan of Care Collaboration   IDT Collaboration with  Occupational Therapist   Patient Position at End of Therapy Seated;Self Releasing Lap Belt Applied;Call Light within Reach;Tray Table within Reach;Phone within Reach    Collaboration Comments POC, CLOF, handoff from OT     Edit to flow sheet above: 1 legged bridges (error)     Assessment    Pt demonstrated unlevel slide board transfers wc <> standard bed with bed rail CGA-SBA, with set up. Pt demonstrated recall of previous training on bridge performance. Pt demonstrated good safety awareness with bed mobility.     Strengths: Independent prior level of function, Motivated for self care and independence, Supportive family, Willingly participates in therapeutic activities, Alert and oriented  Barriers: Bladder retention, Decreased endurance, Impaired activity tolerance, Impaired balance, Impulsive, Limited mobility, Pain    Plan    Confirm wbing statis for R LE  Generalized strengthening and endurance  BKA exercises, progress independence with transfers  Discuss home entry and car transfer     DME  Pt has walker, wc, slide board.      Passport items to be completed:  Get in/out of bed safely, in/out of a vehicle, safely use mobility device, walk or wheel around home/community, navigate up and down stairs, show how to get up/down from the ground, ensure home is accessible, demonstrate HEP, complete caregiver training    Physical Therapy Problems (Active)       Problem: Mobility       Dates: Start:  10/28/23         Goal: STG-Within one week, patient will propel wheelchair community 200ft including 3% grade [place]       Dates: Start:  10/28/23    Expected End:  11/17/23               Problem: Mobility Transfers       Dates: Start:  10/28/23         Goal: STG-Within one week, patient will perform bed mobility independently       Dates: Start:  10/28/23    Expected End:  11/17/23            Goal: STG-Within one week, patient will transfer bed to chair with SBA while maintaining Wbing precautions       Dates: Start:  10/28/23    Expected End:  11/17/23               Problem: PT-Long Term Goals       Dates: Start:  10/28/23         Goal: LTG-By discharge, patient will propel wheelchair  300ft indoors and level outdoors [place]       Dates: Start:  10/28/23    Expected End:  11/17/23            Goal: LTG-By discharge, patient will transfer one surface to another with set up assist while maintaining Wbing precautions       Dates: Start:  10/28/23    Expected End:  11/17/23            Goal: LTG-By discharge, patient will transfer in/out of a car with modA       Dates: Start:  10/28/23    Expected End:  11/17/23

## 2023-10-31 NOTE — PROGRESS NOTES
Physical Medicine & Rehabilitation Progress Note    Encounter Date: 10/31/2023    Chief Complaint: Decreased mobility, leg pain    Interval Events (Subjective):  Patient sitting up in room. He reports therapy is going well. Reviewed AM labs and ongoing anemia with low Fe. Discussed Fe supplement and opiate medications. Discussed about constipation and giving with breakfast. Discussed would have IDT later today.     _____________________________________  Interdisciplinary Team Conference   Most recent IDT on 10/31/2023    IKeaton M.D./Ph.D., was present and led the interdisciplinary team conference on 10/31/2023.  I led the IDT conference and agree with the IDT conference documentation and plan of care as noted below.     Nursing:  Diet Current Diet Order   Procedures    Diet Order Diet: Regular       Eating ADL Supervision  Set-up of equipment or meal/tube feeding, Increased time   % of Last Meal  Oral Nutrition: Lunch, Between % Consumed   Sleep    Bowel Last BM: 10/30/23   Bladder    Barriers to Discharge Home:  R foot injury    Physical Therapy:  Bed Mobility    Transfers Standby Assist  Set-up of equipment, Slideboard transfer from bed to wheelchair, Supervision for safety, Verbal cueing (w/c <> bed with slideboard)   Mobility Unable to Participate (WB restrictions)   Stairs    Barriers to Discharge Home:  Limited by right foot    Occupational Therapy:  Grooming Modified Independent, Seated   Bathing Supervision   UB Dressing Modified Independent   LB Dressing Standby Assist   Toileting Contact Guard Assist   Shower & Transfer    Barriers to Discharge Home:      Respiratory Therapy:  O2 (LPM): 1  O2 Delivery Device: Silicone Nasal Cannula    Case Management:  Continues to work on disposition and DME needs.      Discharge Date/Disposition:  11/4/23  _____________________________________    Objective:  VITAL SIGNS: /56   Pulse 80   Temp 36.4 °C (97.5 °F) (Oral)   Resp 18   Ht  "1.803 m (5' 11\")   Wt 93 kg (205 lb 0.4 oz)   SpO2 95%   BMI 28.60 kg/m²   Gen: NAD  Psych: Mood and affect appropriate  CV: RRR, 1+ RLE edema  Resp: CTAB, no upper airway sounds  Abd: NTND  Neuro: AOx4, following commands  Unchanged from 10/30/23    Laboratory Values:  Recent Results (from the past 72 hour(s))   CBC WITHOUT DIFFERENTIAL    Collection Time: 10/31/23  5:20 AM   Result Value Ref Range    WBC 5.8 4.8 - 10.8 K/uL    RBC 3.25 (L) 4.70 - 6.10 M/uL    Hemoglobin 9.0 (L) 14.0 - 18.0 g/dL    Hematocrit 30.2 (L) 42.0 - 52.0 %    MCV 92.9 81.4 - 97.8 fL    MCH 27.7 27.0 - 33.0 pg    MCHC 29.8 (L) 32.3 - 36.5 g/dL    RDW 57.4 (H) 35.9 - 50.0 fL    Platelet Count 182 164 - 446 K/uL    MPV 10.5 9.0 - 12.9 fL   RETICULOCYTES COUNT    Collection Time: 10/31/23  5:20 AM   Result Value Ref Range    Reticulocyte Count 2.0 0.8 - 2.6 %    Retic, Absolute 0.07 0.04 - 0.12 M/uL    Imm. Reticulocyte Fraction 15.9 2.6 - 16.1 %    Retic Hgb Equivalent 29.1 29.0 - 35.0 pg/cell   IRON/TOTAL IRON BIND    Collection Time: 10/31/23  5:20 AM   Result Value Ref Range    Iron 36 (L) 50 - 180 ug/dL    Total Iron Binding 255 250 - 450 ug/dL    Unsat Iron Binding 219 110 - 370 ug/dL    % Saturation 14 (L) 15 - 55 %   Basic Metabolic Panel    Collection Time: 10/31/23  5:20 AM   Result Value Ref Range    Sodium 136 135 - 145 mmol/L    Potassium 4.3 3.6 - 5.5 mmol/L    Chloride 103 96 - 112 mmol/L    Co2 25 20 - 33 mmol/L    Glucose 65 65 - 99 mg/dL    Bun 12 8 - 22 mg/dL    Creatinine 0.64 0.50 - 1.40 mg/dL    Calcium 8.3 (L) 8.5 - 10.5 mg/dL    Anion Gap 8.0 7.0 - 16.0   ESTIMATED GFR    Collection Time: 10/31/23  5:20 AM   Result Value Ref Range    GFR (CKD-EPI) 117 >60 mL/min/1.73 m 2       Medications:  Scheduled Medications   Medication Dose Frequency    [START ON 11/1/2023] ferrous sulfate  325 mg QDAY with Breakfast    vitamin D3  2,000 Units DAILY    miconazole   BID    Pharmacy Consult Request  1 Each PHARMACY TO DOSE    " senna-docusate  2 Tablet BID    ceFAZolin  2 g Q8HRS    DULoxetine  60 mg DAILY    enoxaparin (LOVENOX) injection  40 mg DAILY AT 1800    morphine  30 mg Q6HRS    morphine ER  15 mg Q12HRS    pregabalin  150 mg TID    omeprazole  20 mg BID    normal saline (PF)  20 mL Q12HRS     PRN medications: mineral oil-pet hydrophilic, Respiratory Therapy Consult, hydrALAZINE, acetaminophen, senna-docusate **AND** polyethylene glycol/lytes **AND** magnesium hydroxide **AND** bisacodyl, mag hydrox-al hydrox-simeth, ondansetron **OR** ondansetron, traZODone, sodium chloride, oxyCODONE immediate-release **OR** oxyCODONE immediate-release, traMADol, zolpidem    Diet:  Current Diet Order   Procedures    Diet Order Diet: Regular       Medical Decision Making and Plan:  BKA abscess - Patient s/p I&D and Revision for abscess and possible osteomyelitis.   -PT and OT for mobility and ADLs. Per guidelines, 15 hours per week between PT, OT and/or SLP.  -Follow-up Orthopedics. NWB LLE, touch down weight bearing RLE for charcot joint     MSSA bacteremia - Patient on Ancef 2g q8 until 11/19/23 per ID. Continue Ancef until 11/19     Anemia - Check AM CBC - 9.8, will recheck 10/31/23 w Fe panel. Repeat 9.0, low Fe and Fe sat. Start on PO Fe.      Thrombocytopenia  - Check AM CBC - 170, monitor       Depression - Patient on Cymbalta      Neuropathy - Patient on Cymbalta 60 mg and Lyrica 150 mg      Pain - Patient on PRN Tylenol, Oxycodone, Lyrica 150 mg TID, and Morphine 30 mg q6. Continue Morphine 30 mg q6     Skin - Patient at risk for skin breakdown due to debility in areas including sacrum, achilles, elbows and head in addition to other sites. Nursing to assess skin daily.      Vitamin D deficiency -16 on admission, start 2000 U     Insomnia - On Ambien 10 mg daily    GI Ppx - Patient on Prilosec for GERD prophylaxis. Patient on Senna-docusate for constipation prophylaxis.      DVT Ppx - Patient Lovenox on transfer. Continue  Lovenox  ____________________________________    T. Loi Pisano MD/PhD  [facility] - Physical Medicine & Rehabilitation   [facility] - Brain Injury Medicine   ____________________________________    Total time:  51 minutes. Time spent included pre-rounding review of vitals and tests, unit/floor time, face-to-face time with the patient including physical examination, care coordination, counseling of patient and/or family, ordering medications/procedures/tests, discussion with CM, PT, OT, SLP and/or other healthcare providers, and documentation in the electronic medical record. Topics discussed included discharge planning, pain control, anemia, low Fe, and start Fe supplement. Patient was discussed separately in IDT today; please see details above.

## 2023-10-31 NOTE — CARE PLAN
The patient is Stable - Low risk of patient condition declining or worsening    Shift Goals  Clinical Goals: Safety  Patient Goals: Participate in therapy  Family Goals: JI    Problem: Skin Integrity  Goal: Skin integrity is maintained or improved  Outcome: Progressing  Note:   Nam Score: 14    Patient's skin remains intact and free from new or accidental injury this shift; no s/s of infection. RN wound protocol checked. Encouraged hydration and educated about the importance of nutrition to keep skin integrity. Will continue to monitor.       Problem: Fall Risk - Rehab  Goal: Patient will remain free from falls  Outcome: Progressing  Note: Osiris Healy Fall risk Assessment Score: 9    Low fall risk interventions   - Call light within reach   - Yellow  socks   - Belongings within reach   - Bed in the lowest position

## 2023-10-31 NOTE — PROGRESS NOTES
Staff asked for an update of his wound photos however refused. He stated that the nurse just dressed the stump and he wanted to wait in the morning when it's dressing time. Also refused to take a photo of his sacrum but was able to update groin photo. Will relay to day shift nurse.

## 2023-11-01 NOTE — PROGRESS NOTES
NURSING DAILY NOTE    Name: Richard Hubbard II   Date of Admission: 10/27/2023   Admitting Diagnosis: BKA stump complication (HCC)  Attending Physician: Keaton Pisano M.d.  Allergies: Nsaids, Diphenhydramine, Mirtazapine, Penicillins, and Promethazine    Safety  Patient Assist     Patient Precautions  Fall Risk, Non Weight Bearing Left Lower Extremity, Toe Touch Weight Bearing Right Lower Extremity  Precaution Comments  Touchdown WB R LE  Bed Transfer Status  Standby Assist (lateral scoot (not using SB))  Toilet Transfer Status   Minimal Assist  Assistive Devices  Rails, Wheelchair  Oxygen  Silicone Nasal Cannula  Diet/Therapeutic Dining  Current Diet Order   Procedures    Diet Order Diet: Regular     Pill Administration  whole  Agitated Behavioral Scale     ABS Level of Severity       Fall Risk  Has the patient had a fall this admission?   No  Osiris Healy Fall Risk Scoring  9, LOW RISK  Fall Risk Safety Measures  bed alarm, chair alarm, and poor balance    Vitals  Temperature: 36.4 °C (97.5 °F)  Temp src: Oral  Pulse: 84  Respiration: 18  Blood Pressure: 108/71  Blood Pressure MAP (Calculated): 83 MM HG  BP Location: Right, Upper Arm  Patient BP Position: Mast's Position     Oxygen  Pulse Oximetry: 98 %  O2 (LPM): 1  O2 Delivery Device: Silicone Nasal Cannula    Bowel and Bladder  Last Bowel Movement  10/30/23  Stool Type  Type 3: Like a sausage, but with cracks on its surface  Bowel Device     Continent  Bladder:  (piedra in place)   Bowel: No movement  Bladder Function  Urine Void (mL): 300 ml  Number of Times Voided: 1  Urine Color: Yellow  Genitourinary Assessment   Bladder Assessment (WDL):  WDL Except  Piedra Catheter: Present with Active Order  Urine Color: Yellow  Bladder Device: Urinal  Bladder Scan: Post Void  $ Bladder Scan Results (mL): 117    Skin  Nam Score   14  Sensory Interventions   Bed Types: Standard/Trauma  Mattress  Skin Preventative Measures: Pillows in Use for Support / Positioning  Moisture Interventions  Moisturizers/Barriers: Antifungal Paste      Pain  Pain Rating Scale  7 - Focus of attention, prevents doing daily activities  Pain Location  Generalized  Pain Location Orientation  Left  Pain Interventions   Medication (see MAR)    ADLs    Bathing   Shower, * * With Assistance from, Staff  Linen Change   Partial  Personal Hygiene  Moist Charlotte Wipes, Perineal Care  Chlorhexidine Bath      Oral Care     Teeth/Dentures     Shave     Nutrition Percentage Eaten  Lunch, Between % Consumed  Environmental Precautions     Patient Turns/Positioning  Patient Turns Self from Side to Side  Patient Turns Assistance/Tolerance     Bed Positions     Head of Bed Elevated         Psychosocial/Neurologic Assessment  Psychosocial Assessment  Psychosocial (WDL):  WDL Except  Patient Behaviors: Anxious  Neurologic Assessment  Neuro (WDL): Within Defined Limits  Level of Consciousness: Alert  EENT (WDL):  WDL Except    Cardio/Pulmonary Assessment  Edema   Scrotal Edema: Generalized  Respiratory Breath Sounds  RUL Breath Sounds: Diminished  RML Breath Sounds: Diminished  RLL Breath Sounds: Diminished  DONALD Breath Sounds: Diminished  LLL Breath Sounds: Diminished  Cardiac Assessment   Cardiac (WDL):  WDL Except (CHF)

## 2023-11-01 NOTE — THERAPY
Occupational Therapy  Daily Treatment     Patient Name: Richard Hubbard II  Age:  48 y.o., Sex:  male  Medical Record #: [id number]  Today's Date: 11/1/2023     Precautions  Precautions: Fall Risk, Non Weight Bearing Left Lower Extremity, Toe Touch Weight Bearing Right Lower Extremity  Comments: Touchdown WB R LE         Subjective    Patient reported having a virtual appointment this morning at 9:15 with his pain doctor.  Agreeable to OT until 09:15.       Objective       11/01/23 0831   Therapy Missed   Missed Therapy (Minutes) 15   Reason For Missed Therapy Medical - Patient on Hold from Therapy  (virtual appointment with pain doctor)   OT Charge Group   OT Therapy Activity (Units) 1   OT Therapeutic Exercise (Units) 2   OT Total Time Spent   OT Individual Total Time Spent (Mins) 45   Precautions   Precautions Fall Risk;Non Weight Bearing Left Lower Extremity;Toe Touch Weight Bearing Right Lower Extremity   Comments Touchdown WB R LE   Functional Level of Assist   Bed, Chair, Wheelchair Transfer Standby Assist   Bed Chair Wheelchair Transfer Description Supervision for safety  (sba lateral scoot w/c <> mat table)   Sitting Upper Body Exercises   Upper Extremity Bike Minutes / Rest Breaks (See Comments)  (water bike level 0 x 20 minutes with two short rest breaks)   Comments Seated EOM A/P leans with 4 lb ball held to upper chest for core strengthening.   Interdisciplinary Plan of Care Collaboration   IDT Collaboration with  Physician   Patient Position at End of Therapy Seated;Call Light within Reach;Tray Table within Reach;Phone within Reach   Collaboration Comments requested hold due to virual pain doctor appointment     W/C mobility from his room to the back gym, mod I with extra time.      Assessment    Patient demonstrating good motivation to participate in therapy.  He presents with impaired endurance.    Strengths: Able to follow instructions, Adequate strength, Independent prior level of function,  Motivated for self care and independence, Pleasant and cooperative, Willingly participates in therapeutic activities, Supportive family  Barriers: Decreased endurance, Fatigue, Generalized weakness, Impaired activity tolerance, Impaired balance, Limited mobility, Pain    Plan    ADLs, IADLs, safety with transfers, strength/endurance    Occupational Therapy Goals (Active)       Problem: IADL's       Dates: Start:  10/28/23         Goal: STG-Within one week, patient will prepare a meal at w/c level w/ SBA       Dates: Start:  10/28/23    Expected End:  11/17/23         Goal Note filed on 10/31/23 1151 by Binu Keen, OT/L       Not yet attempted              Goal: STG-Within one week, patient will utilize washer and dryer at w/c level w/ SBA and AE as needed       Dates: Start:  10/28/23    Expected End:  11/17/23         Goal Note filed on 10/31/23 1151 by Binu Keen, OT/L       Not yet attempted                 Problem: OT Long Term Goals       Dates: Start:  10/28/23         Goal: LTG-By discharge, patient will complete basic self care tasks w/ mod I and AE/DME as needed       Dates: Start:  10/28/23    Expected End:  11/17/23            Goal: LTG-By discharge, patient will complete basic home management w/ mod I and LRAD as needed       Dates: Start:  10/28/23    Expected End:  11/17/23               Problem: Toileting       Dates: Start:  10/28/23         Goal: STG-Within one week, patient will complete toileting tasks w/ SBA and DME as needed       Dates: Start:  10/28/23    Expected End:  11/17/23         Goal Note filed on 10/31/23 1151 by Binu Keen, OT/L       CGA

## 2023-11-01 NOTE — CARE PLAN
"The patient is Stable - Low risk of patient condition declining or worsening    Shift Goals  Clinical Goals: Safety  Patient Goals: Participate in therapy  Family Goals: JI    Patient is not progressing towards the following goals:    Problem: Fall Risk - Rehab  Goal: Patient will remain free from falls  Outcome: Not Met  Note: Osiris Healy Fall risk Assessment Score: 12    Moderate fall risk Interventions  - Bed and strip alarm   - Yellow sign by the door   - Yellow wrist band \"Fall risk\"  - Fall risk education provided     "

## 2023-11-01 NOTE — PROGRESS NOTES
..                                                         NURSING DAILY NOTE    Name: Richard Hubbard II   Date of Admission: 10/27/2023   Admitting Diagnosis: BKA stump complication (HCC)  Attending Physician: Keaton Pisano M.d.  Allergies: Nsaids, Diphenhydramine, Mirtazapine, Penicillins, and Promethazine    Safety  Patient Assist     Patient Precautions  Fall Risk, Non Weight Bearing Left Lower Extremity, Toe Touch Weight Bearing Right Lower Extremity  Precaution Comments  Touchdown WB R LE  Bed Transfer Status  Standby Assist (lateral scoot (not using SB))  Toilet Transfer Status   Minimal Assist  Assistive Devices  Rails, Wheelchair  Oxygen  None - Room Air  Diet/Therapeutic Dining  Current Diet Order   Procedures    Diet Order Diet: Regular     Pill Administration  whole  Agitated Behavioral Scale     ABS Level of Severity       Fall Risk  Has the patient had a fall this admission?   No  Osiris Healy Fall Risk Scoring  12, MODERATE RISK  Fall Risk Safety Measures  bed alarm, chair alarm, low vision/ hearing, and ok to leave pt in bathroom    Vitals  Temperature: 36.4 °C (97.5 °F)  Temp src: Oral  Pulse: 87  Respiration: 18  Blood Pressure: 100/62  Blood Pressure MAP (Calculated): 75 MM HG  BP Location: Right, Upper Arm  Patient BP Position: Sitting     Oxygen  Pulse Oximetry: 98 %  O2 (LPM): 1  O2 Delivery Device: None - Room Air    Bowel and Bladder  Last Bowel Movement  10/31/23 (per patient)  Stool Type  Type 3: Like a sausage, but with cracks on its surface  Bowel Device     Continent  Bladder:  (piedra in place)   Bowel: No movement  Bladder Function  Urine Void (mL): 500 ml  Number of Times Voided: 1  Urine Color: Yellow  Genitourinary Assessment   Bladder Assessment (WDL):  Within Defined Limits  Piedra Catheter: Present with Active Order  Urine Color: Yellow  Bladder Device: Urinal  Bladder Scan: Post Void  $ Bladder Scan Results (mL): 117    Skin  Nam Score   17  Sensory  Interventions   Bed Types: Standard/Trauma Mattress  Skin Preventative Measures: Pillows in Use for Support / Positioning  Moisture Interventions  Moisturizers/Barriers: Antifungal Paste      Pain  Pain Rating Scale  7 - Focus of attention, prevents doing daily activities  Pain Location  Leg, Hand  Pain Location Orientation  Right, Left  Pain Interventions   Medication (see MAR)    ADLs    Bathing   Shower, * * With Assistance from, Staff  Linen Change   Partial  Personal Hygiene  Moist Charlotte Wipes, Perineal Care  Chlorhexidine Bath      Oral Care     Teeth/Dentures     Shave     Nutrition Percentage Eaten  Lunch, Between % Consumed  Environmental Precautions     Patient Turns/Positioning  Patient Turns Self from Side to Side  Patient Turns Assistance/Tolerance     Bed Positions     Head of Bed Elevated         Psychosocial/Neurologic Assessment  Psychosocial Assessment  Psychosocial (WDL):  Within Defined Limits  Patient Behaviors: Anxious  Neurologic Assessment  Neuro (WDL): Exceptions to WDL (neuropathy)  Level of Consciousness: Alert  Orientation Level: Oriented X4  EENT (WDL):  WDL Except    Cardio/Pulmonary Assessment  Edema   Scrotal Edema: Generalized  Respiratory Breath Sounds  RUL Breath Sounds: Clear  RML Breath Sounds: Clear  RLL Breath Sounds: Diminished  DONALD Breath Sounds: Clear  LLL Breath Sounds: Diminished  Cardiac Assessment   Cardiac (WDL):  WDL Except (CHF)

## 2023-11-01 NOTE — PROGRESS NOTES
"  Physical Medicine & Rehabilitation Progress Note    Encounter Date: 11/1/2023    Chief Complaint: Decreased mobility, leg pain    Interval Events (Subjective):  Patient sitting up in room. He reports therapy is going well. He reports worsening rash. Discussed steroid taper as it has worked for him in the past. He is in agreement.     _____________________________________  Interdisciplinary Team Conference   Most recent IDT on 10/31/2023    Discharge Date/Disposition:  11/4/23  _____________________________________    Objective:  VITAL SIGNS: /70   Pulse 96   Temp 36.4 °C (97.6 °F) (Oral)   Resp 19   Ht 1.803 m (5' 11\")   Wt 93 kg (205 lb 0.4 oz)   SpO2 93%   BMI 28.60 kg/m²   Gen: NAD  Psych: Mood and affect appropriate  CV: RRR, 1+ edema  Resp: CTAB, no upper airway sounds  Abd: NTND  Neuro: AOx4, following commands  Skin: bilateral extensor forearm rashes    Laboratory Values:  Recent Results (from the past 72 hour(s))   CBC WITHOUT DIFFERENTIAL    Collection Time: 10/31/23  5:20 AM   Result Value Ref Range    WBC 5.8 4.8 - 10.8 K/uL    RBC 3.25 (L) 4.70 - 6.10 M/uL    Hemoglobin 9.0 (L) 14.0 - 18.0 g/dL    Hematocrit 30.2 (L) 42.0 - 52.0 %    MCV 92.9 81.4 - 97.8 fL    MCH 27.7 27.0 - 33.0 pg    MCHC 29.8 (L) 32.3 - 36.5 g/dL    RDW 57.4 (H) 35.9 - 50.0 fL    Platelet Count 182 164 - 446 K/uL    MPV 10.5 9.0 - 12.9 fL   RETICULOCYTES COUNT    Collection Time: 10/31/23  5:20 AM   Result Value Ref Range    Reticulocyte Count 2.0 0.8 - 2.6 %    Retic, Absolute 0.07 0.04 - 0.12 M/uL    Imm. Reticulocyte Fraction 15.9 2.6 - 16.1 %    Retic Hgb Equivalent 29.1 29.0 - 35.0 pg/cell   IRON/TOTAL IRON BIND    Collection Time: 10/31/23  5:20 AM   Result Value Ref Range    Iron 36 (L) 50 - 180 ug/dL    Total Iron Binding 255 250 - 450 ug/dL    Unsat Iron Binding 219 110 - 370 ug/dL    % Saturation 14 (L) 15 - 55 %   Basic Metabolic Panel    Collection Time: 10/31/23  5:20 AM   Result Value Ref Range    Sodium " 136 135 - 145 mmol/L    Potassium 4.3 3.6 - 5.5 mmol/L    Chloride 103 96 - 112 mmol/L    Co2 25 20 - 33 mmol/L    Glucose 65 65 - 99 mg/dL    Bun 12 8 - 22 mg/dL    Creatinine 0.64 0.50 - 1.40 mg/dL    Calcium 8.3 (L) 8.5 - 10.5 mg/dL    Anion Gap 8.0 7.0 - 16.0   ESTIMATED GFR    Collection Time: 10/31/23  5:20 AM   Result Value Ref Range    GFR (CKD-EPI) 117 >60 mL/min/1.73 m 2       Medications:  Scheduled Medications   Medication Dose Frequency    ferrous sulfate  325 mg QDAY with Breakfast    vitamin D3  2,000 Units DAILY    miconazole   BID    Pharmacy Consult Request  1 Each PHARMACY TO DOSE    senna-docusate  2 Tablet BID    ceFAZolin  2 g Q8HRS    DULoxetine  60 mg DAILY    enoxaparin (LOVENOX) injection  40 mg DAILY AT 1800    morphine  30 mg Q6HRS    morphine ER  15 mg Q12HRS    pregabalin  150 mg TID    omeprazole  20 mg BID    normal saline (PF)  20 mL Q12HRS     PRN medications: albuterol, mineral oil-pet hydrophilic, Respiratory Therapy Consult, hydrALAZINE, acetaminophen, senna-docusate **AND** polyethylene glycol/lytes **AND** magnesium hydroxide **AND** bisacodyl, mag hydrox-al hydrox-simeth, ondansetron **OR** ondansetron, traZODone, sodium chloride, oxyCODONE immediate-release **OR** oxyCODONE immediate-release, traMADol, zolpidem    Diet:  Current Diet Order   Procedures    Diet Order Diet: Regular       Medical Decision Making and Plan:  BKA abscess - Patient s/p I&D and Revision for abscess and possible osteomyelitis.   -PT and OT for mobility and ADLs. Per guidelines, 15 hours per week between PT, OT and/or SLP.  -Follow-up Orthopedics. NWB LLE, touch down weight bearing RLE for charcot joint     MSSA bacteremia - Patient on Ancef 2g q8 until 11/19/23 per ID. Continue Ancef until 11/19     Anemia - Check AM CBC - 9.8, will recheck 10/31/23 w Fe panel. Repeat 9.0, low Fe and Fe sat. Start on PO Fe.      Thrombocytopenia  - Check AM CBC - 170, monitor       Depression - Patient on Cymbalta       Neuropathy - Patient on Cymbalta 60 mg and Lyrica 150 mg      Pain - Patient on PRN Tylenol, Oxycodone, Lyrica 150 mg TID, and Morphine 30 mg q6. Ongoing pain, continue Morphine 30 mg q6h     Skin - Patient at risk for skin breakdown due to debility in areas including sacrum, achilles, elbows and head in addition to other sites. Nursing to assess skin daily.   -Psoriasis rashes - start Medrol dose yahaira     Vitamin D deficiency -16 on admission, start 2000 U     Insomnia - On Ambien 10 mg daily    GI Ppx - Patient on Prilosec for GERD prophylaxis. Patient on Senna-docusate for constipation prophylaxis.      DVT Ppx - Patient Lovenox on transfer. Continue Lovenox  ____________________________________    T. Loi Pisano MD/PhD  [facility] - Physical Medicine & Rehabilitation   [facility] - Brain Injury Medicine   ____________________________________

## 2023-11-01 NOTE — THERAPY
Physical Therapy   Daily Treatment     Patient Name: Richard Hubbard II  Age:  48 y.o., Sex:  male  Medical Record #: [id number]  Today's Date: 11/1/2023     Precautions  Precautions: Fall Risk, Non Weight Bearing Left Lower Extremity, Toe Touch Weight Bearing Right Lower Extremity  Comments: Touchdown WB R LE    Subjective    Seated in wc, completed grooming at wc level, agreeable to PT     Objective       11/01/23 0701   PT Charge Group   PT Therapeutic Exercise (Units) 3   PT Therapeutic Activities (Units) 1   Supervising Physical Therapist Kate Bryant   PT Total Time Spent   PT Individual Total Time Spent (Mins) 60   Transfer Functional Level of Assist   Bed, Chair, Wheelchair Transfer Standby Assist   Bed Chair Wheelchair Transfer Description Verbal cueing;Supervision for safety  (lateral scoot)   Supine Lower Body Exercise   Supine Lower Body Exercises Yes   Chest Press 3 sets of 15;Bilateral  (5lb weighted ball)   Other Exercises I's T's Y's 3 x 15, AA for I's. prone hip ext and knee flexion 3 x 15, seated trunk rotation, 20 minutes in prone for hip fexor stretch   Bed Mobility    Supine to Sit Modified Independent   Sit to Supine Modified Independent   Sit to Stand Unable to Participate   Scooting Modified Independent   Rolling Modified Independent   Interdisciplinary Plan of Care Collaboration   IDT Collaboration with  Occupational Therapist   Patient Position at End of Therapy Seated;Self Releasing Lap Belt Applied;Call Light within Reach   Collaboration Comments CLOF   Physical Therapist Assigned   Assigned PT / Treatment Time / Comments 60/30AM         Assessment    Pt completed lateral scoot transfer with SBA, assistance to stabilize wc. Pt presents with significantly impaired activity tolerance and requires extended rest breaks between activities   Strengths: Independent prior level of function, Motivated for self care and independence, Supportive family, Willingly participates in therapeutic  activities, Alert and oriented  Barriers: Bladder retention, Decreased endurance, Impaired activity tolerance, Impaired balance, Impulsive, Limited mobility, Pain    Plan    Transfers to elevated mat to simulate van transfers  Outdoor w/c skills  Review amputee HEP- prosthetic prep, LE ROM/strengthening program    DME       Passport items to be completed:  Get in/out of bed safely, in/out of a vehicle, safely use mobility device, walk or wheel around home/community, navigate up and down stairs, show how to get up/down from the ground, ensure home is accessible, demonstrate HEP, complete caregiver training    Physical Therapy Problems (Active)       Problem: Mobility       Dates: Start:  10/28/23         Goal: STG-Within one week, patient will propel wheelchair community 200ft including 3% grade Adore       Dates: Start:  10/28/23    Expected End:  11/17/23         Goal Note filed on 10/31/23 1246 by MARIBEL Haile       NT outdoor and w/c ramps                 Problem: Mobility Transfers       Dates: Start:  10/28/23         Goal: STG-Within one week, patient will transfer bed to chair with SBA while maintaining Wbing precautions       Dates: Start:  10/28/23    Expected End:  11/17/23         Goal Note filed on 10/31/23 1246 by JOELLEN HaileT       Requires CGA with slideboard transfers                 Problem: PT-Long Term Goals       Dates: Start:  10/28/23         Goal: LTG-By discharge, patient will propel wheelchair 300ft indoors and level outdoors Alfonzo       Dates: Start:  10/28/23    Expected End:  11/17/23            Goal: LTG-By discharge, patient will transfer one surface to another with set up assist while maintaining Wbing precautions       Dates: Start:  10/28/23    Expected End:  11/17/23            Goal: LTG-By discharge, patient will transfer in/out of a car with modA       Dates: Start:  10/28/23    Expected End:  11/17/23

## 2023-11-01 NOTE — THERAPY
"Occupational Therapy  Daily Treatment     Patient Name: Richard Hubbard II  Age:  48 y.o., Sex:  male  Medical Record #: [id number]  Today's Date: 11/1/2023     Precautions  Precautions: Fall Risk, Non Weight Bearing Left Lower Extremity, Toe Touch Weight Bearing Right Lower Extremity  Comments: Touchdown WB R LE         Subjective    \"I really want to get some new shoes but it seems silly to buy two shoes, I only really need one shoe.\"      Objective       11/01/23 1031   OT Charge Group   OT Therapy Activity (Units) 2   OT Therapeutic Exercise (Units) 2   OT Total Time Spent   OT Individual Total Time Spent (Mins) 60   Pain   Intervention Distraction;Emotional Support;Heat Applied  (stretching)   Pain 0 - 10 Group   Location Shoulder   Location Orientation Right;Left   Pain Rating Scale (NPRS) 4   Description Aching   Comfort Goal Comfort with Movement;Perform Activity;Sleep Comfortably   Therapist Pain Assessment During Activity   Cognition    Level of Consciousness Alert   IADL Treatments   IADL Treatments Home management   Home Management pt engaged in laundry task- pt able to put clothing in washer and turn knobs, shut door with mod I while seated in w/c   Interdisciplinary Plan of Care Collaboration   Patient Position at End of Therapy Seated;Self Releasing Lap Belt Applied;Chair Alarm On;Call Light within Reach;Tray Table within Reach;Phone within Reach         Assessment    Pt tolerated session fair. Pt fatigued from other therapies earlier today but pleasant and motivated to continue working. Pt c/o shoulder pain/soreness- educated pt on stretching for BUE: tricep, shoulder, shoulder rolls forward/backward, scapular retraction, forearm stretch, upper trap stretch, and neck stretches while seated in w/c. Pt held each stretch x 20-30 seconds. Pt completed w/c mobility to/from therapy gym to room with slow magdalena and use of BUE only. Educated pt on website where he can  purchase single shoes or different " sized shoes if needed due to prosthetic leg.     Strengths: Able to follow instructions, Adequate strength, Independent prior level of function, Motivated for self care and independence, Pleasant and cooperative, Willingly participates in therapeutic activities, Supportive family  Barriers: Decreased endurance, Fatigue, Generalized weakness, Impaired activity tolerance, Impaired balance, Limited mobility, Pain    Plan    ADLs, IADLs, safety with transfers, strength/endurance    DME         Occupational Therapy Goals (Active)       Problem: IADL's       Dates: Start:  10/28/23         Goal: STG-Within one week, patient will prepare a meal at w/c level w/ SBA       Dates: Start:  10/28/23    Expected End:  11/17/23         Goal Note filed on 10/31/23 1151 by Binu Keen, OT/L       Not yet attempted              Goal: STG-Within one week, patient will utilize washer and dryer at w/c level w/ SBA and AE as needed       Dates: Start:  10/28/23    Expected End:  11/17/23         Goal Note filed on 10/31/23 1151 by Binu Keen, OT/L       Not yet attempted                 Problem: OT Long Term Goals       Dates: Start:  10/28/23         Goal: LTG-By discharge, patient will complete basic self care tasks w/ mod I and AE/DME as needed       Dates: Start:  10/28/23    Expected End:  11/17/23            Goal: LTG-By discharge, patient will complete basic home management w/ mod I and LRAD as needed       Dates: Start:  10/28/23    Expected End:  11/17/23               Problem: Toileting       Dates: Start:  10/28/23         Goal: STG-Within one week, patient will complete toileting tasks w/ SBA and DME as needed       Dates: Start:  10/28/23    Expected End:  11/17/23         Goal Note filed on 10/31/23 1151 by Binu Keen, OT/L       CGA

## 2023-11-01 NOTE — PROGRESS NOTES
Received shift report and assumed care of patient.  Patient awake, calm and stable, currently positioned in bed for comfort and safety; call light within reach.  Continues to c/o chronic pain at this time.  Will continue to monitor.

## 2023-11-01 NOTE — FLOWSHEET NOTE
11/01/23 0659   Events/Summary/Plan   Events/Summary/Plan RA pulse ox check. States he is sob   Vital Signs   Pulse 96   Respiration 18   Pulse Oximetry 93 %   $ Pulse Oximetry (Spot Check) Yes   Respiratory Assessment   Level of Consciousness Alert   Chest Exam   Work Of Breathing / Effort Within Normal Limits   Breath Sounds   RUL Breath Sounds Diminished   RML Breath Sounds Diminished   RLL Breath Sounds Diminished   DONALD Breath Sounds Diminished   LLL Breath Sounds Diminished   Oxygen   O2 Delivery Device Room air w/o2 available

## 2023-11-01 NOTE — CARE PLAN
Problem: Self Care  Goal: Patient will have the ability to perform ADLs independently or with assistance  Outcome: Progressing  Note: Patient able to perform regular activities this shift.  Pain controlled this shift.  Pain management includes PRN pain meds as well as non-pharmacological measures such as emotional support, rest, and repositioning.  Will continue to monitor.    The patient is Stable - Low risk of patient condition declining or worsening    Shift Goals  Clinical Goals: Safety  Patient Goals: Participate in therapy  Family Goals: JI    Progress made toward(s) clinical / shift goals:  pt participates with therapy and is cooperative with nursing    Patient is not progressing towards the following goals:

## 2023-11-02 NOTE — THERAPY
Occupational Therapy  Daily Treatment     Patient Name: Richard Hubbard II  Age:  48 y.o., Sex:  male  Medical Record #: [id number]  Today's Date: 11/2/2023     Precautions  Precautions: (P) Fall Risk, Non Weight Bearing Left Lower Extremity, Toe Touch Weight Bearing Right Lower Extremity  Comments: Touchdown WB R LE         Subjective    Patient was seated in w/c in his room applying aquafor to arms/legs.  He was agreeable to OT.      Objective       11/02/23 0831   OT Charge Group   OT Self Care / ADL (Units) 1   OT Therapy Activity (Units) 3   OT Total Time Spent   OT Individual Total Time Spent (Mins) 60   Precautions   Precautions Fall Risk;Non Weight Bearing Left Lower Extremity;Toe Touch Weight Bearing Right Lower Extremity   Functional Level of Assist   Grooming Modified Independent;Seated   Grooming Description   (oral care, brush hair, wash/dry hands/face)   Sitting Upper Body Exercises   Tricep Press 3 sets of 15;Bilateral;Weight (See Comments for lbs)  (35 lbs on rickshaw forward)   Interdisciplinary Plan of Care Collaboration   Patient Position at End of Therapy Seated  (hand off to PT)     Education and training on dry tub/shower transfer using tub bench and slideboard.  Discussed body positioning to make transfer as safe as possible.      Discussed home toilet setup and whether patient can get w/c close enough to toilet (which has a droparm commode over it) to perform slideboard transfer.  He thinks he can get it close enough, but it was recommended that if he cannot, he should move the commode out of the bathroom into an open space for increased ease and safety with transfer.      Assessment    Patient will need a slideboard for home use as he initially stated he had one at evaluation, but now states he does not.  May need to move bedside commode out of bathroom if he cannot get w/c close enough to it.    Strengths: Able to follow instructions, Adequate strength, Independent prior level of  "function, Motivated for self care and independence, Pleasant and cooperative, Willingly participates in therapeutic activities, Supportive family  Barriers: Decreased endurance, Fatigue, Generalized weakness, Impaired activity tolerance, Impaired balance, Limited mobility, Pain    Plan    Shower tomorrow for d/c QI    DME  OT DME Recommendations  Additional Equipment: Transfer Board (30\" Length)      Occupational Therapy Goals (Active)       Problem: IADL's       Dates: Start:  10/28/23         Goal: STG-Within one week, patient will prepare a meal at w/c level w/ SBA       Dates: Start:  10/28/23    Expected End:  11/17/23         Goal Note filed on 10/31/23 1151 by Binu Keen, OT/L       Not yet attempted              Goal: STG-Within one week, patient will utilize washer and dryer at w/c level w/ SBA and AE as needed       Dates: Start:  10/28/23    Expected End:  11/17/23         Goal Note filed on 10/31/23 1151 by Binu Keen, OT/L       Not yet attempted                 Problem: OT Long Term Goals       Dates: Start:  10/28/23         Goal: LTG-By discharge, patient will complete basic self care tasks w/ mod I and AE/DME as needed       Dates: Start:  10/28/23    Expected End:  11/17/23            Goal: LTG-By discharge, patient will complete basic home management w/ mod I and LRAD as needed       Dates: Start:  10/28/23    Expected End:  11/17/23               Problem: Toileting       Dates: Start:  10/28/23         Goal: STG-Within one week, patient will complete toileting tasks w/ SBA and DME as needed       Dates: Start:  10/28/23    Expected End:  11/17/23         Goal Note filed on 10/31/23 1151 by Binu Keen, OT/L       CGA                "

## 2023-11-02 NOTE — CARE PLAN
The patient is Stable - Low risk of patient condition declining or worsening    Shift Goals  Clinical Goals: safety, pain management  Patient Goals: safety, pain management  Family Goals: JI    Progress made toward(s) clinical / shift goals:    Problem: Pain - Standard  Goal: Alleviation of pain or a reduction in pain to the patient’s comfort goal  Outcome: Progressing   --pain management consists of extended release and break through prn immediate release  Problem: Fall Risk - Rehab  Goal: Patient will remain free from falls  Outcome: Progressing   Remains free from falls    Patient is not progressing towards the following goals:none

## 2023-11-02 NOTE — PROGRESS NOTES
"  Physical Medicine & Rehabilitation Progress Note    Encounter Date: 11/2/2023    Chief Complaint: Decreased mobility, leg pain    Interval Events (Subjective):  Patient sitting up in room. He denies NVD. Denies SOB. He reports mild improvement in rash. Denies SOB.     _____________________________________  Interdisciplinary Team Conference   Most recent IDT on 10/31/2023    Discharge Date/Disposition:  11/4/23  _____________________________________    Objective:  VITAL SIGNS: /69   Pulse 99   Temp 36.4 °C (97.5 °F) (Oral)   Resp 18   Ht 1.803 m (5' 11\")   Wt 93 kg (205 lb 0.4 oz)   SpO2 96%   BMI 28.60 kg/m²   Gen: NAD  Psych: Mood and affect appropriate  CV: RRR, 1+ edema  Resp: CTAB, no upper airway sounds  Abd: NTND  Neuro: AOx4, following commands  Skin: bilateral extensor forearm rashes  Unchanged from 11/1/23    Laboratory Values:  Recent Results (from the past 72 hour(s))   CBC WITHOUT DIFFERENTIAL    Collection Time: 10/31/23  5:20 AM   Result Value Ref Range    WBC 5.8 4.8 - 10.8 K/uL    RBC 3.25 (L) 4.70 - 6.10 M/uL    Hemoglobin 9.0 (L) 14.0 - 18.0 g/dL    Hematocrit 30.2 (L) 42.0 - 52.0 %    MCV 92.9 81.4 - 97.8 fL    MCH 27.7 27.0 - 33.0 pg    MCHC 29.8 (L) 32.3 - 36.5 g/dL    RDW 57.4 (H) 35.9 - 50.0 fL    Platelet Count 182 164 - 446 K/uL    MPV 10.5 9.0 - 12.9 fL   RETICULOCYTES COUNT    Collection Time: 10/31/23  5:20 AM   Result Value Ref Range    Reticulocyte Count 2.0 0.8 - 2.6 %    Retic, Absolute 0.07 0.04 - 0.12 M/uL    Imm. Reticulocyte Fraction 15.9 2.6 - 16.1 %    Retic Hgb Equivalent 29.1 29.0 - 35.0 pg/cell   IRON/TOTAL IRON BIND    Collection Time: 10/31/23  5:20 AM   Result Value Ref Range    Iron 36 (L) 50 - 180 ug/dL    Total Iron Binding 255 250 - 450 ug/dL    Unsat Iron Binding 219 110 - 370 ug/dL    % Saturation 14 (L) 15 - 55 %   Basic Metabolic Panel    Collection Time: 10/31/23  5:20 AM   Result Value Ref Range    Sodium 136 135 - 145 mmol/L    Potassium 4.3 3.6 - " 5.5 mmol/L    Chloride 103 96 - 112 mmol/L    Co2 25 20 - 33 mmol/L    Glucose 65 65 - 99 mg/dL    Bun 12 8 - 22 mg/dL    Creatinine 0.64 0.50 - 1.40 mg/dL    Calcium 8.3 (L) 8.5 - 10.5 mg/dL    Anion Gap 8.0 7.0 - 16.0   ESTIMATED GFR    Collection Time: 10/31/23  5:20 AM   Result Value Ref Range    GFR (CKD-EPI) 117 >60 mL/min/1.73 m 2       Medications:  Scheduled Medications   Medication Dose Frequency    methylPREDNISolone  4 mg 4X/DAY WITH MEALS + NIGHTLY    Followed by    methylPREDNISolone  8 mg 4X/DAY WITH MEALS + NIGHTLY    Followed by    [START ON 11/3/2023] methylPREDNISolone  4 mg 4X/DAY WITH MEALS + NIGHTLY    Followed by    [START ON 11/4/2023] methylPREDNISolone  4 mg TID WITH MEALS    Followed by    [START ON 11/5/2023] methylPREDNISolone  4 mg BID WITH MEALS    ferrous sulfate  325 mg QDAY with Breakfast    vitamin D3  2,000 Units DAILY    miconazole   BID    Pharmacy Consult Request  1 Each PHARMACY TO DOSE    senna-docusate  2 Tablet BID    ceFAZolin  2 g Q8HRS    DULoxetine  60 mg DAILY    enoxaparin (LOVENOX) injection  40 mg DAILY AT 1800    morphine  30 mg Q6HRS    morphine ER  15 mg Q12HRS    pregabalin  150 mg TID    omeprazole  20 mg BID    normal saline (PF)  20 mL Q12HRS     PRN medications: albuterol, mineral oil-pet hydrophilic, Respiratory Therapy Consult, hydrALAZINE, acetaminophen, senna-docusate **AND** polyethylene glycol/lytes **AND** magnesium hydroxide **AND** bisacodyl, mag hydrox-al hydrox-simeth, ondansetron **OR** ondansetron, traZODone, sodium chloride, oxyCODONE immediate-release **OR** oxyCODONE immediate-release, traMADol, zolpidem    Diet:  Current Diet Order   Procedures    Diet Order Diet: Regular       Medical Decision Making and Plan:  SHAHZAD abscess - Patient s/p I&D and Revision for abscess and possible osteomyelitis. Required revision for osteomyelitis    -PT and OT for mobility and ADLs. Per guidelines, 15 hours per week between PT, OT and/or SLP.  -Follow-up  Orthopedics. NWB LLE, touch down weight bearing RLE for charcot joint     MSSA bacteremia - Patient on Ancef 2g q8 until 11/19/23 per ID. Continue Ancef      Anemia - Check AM CBC - 9.8, will recheck 10/31/23 w Fe panel. Repeat 9.0, low Fe and Fe sat. Start on PO Fe.      Thrombocytopenia  - Check AM CBC - 170, monitor. Will repeat       Depression - Patient on Cymbalta      Neuropathy - Patient on Cymbalta 60 mg and Lyrica 150 mg. Continue Cymbalta and Lyrica.      Pain - Patient on PRN Tylenol, Oxycodone, Lyrica 150 mg TID, and Morphine 30 mg q6. Ongoing pain, continue Morphine 30 mg q6h     Skin - Patient at risk for skin breakdown due to debility in areas including sacrum, achilles, elbows and head in addition to other sites. Nursing to assess skin daily.   -Psoriasis rashes - start Medrol dose yahaira. Continue steroids as improving arm rashes    Vitamin D deficiency -16 on admission, start 2000 U     Insomnia - On Ambien 10 mg daily    GI Ppx - Patient on Prilosec for GERD prophylaxis. Patient on Senna-docusate for constipation prophylaxis.      DVT Ppx - Patient Lovenox on transfer. Continue Lovenox  ____________________________________    T. Loi Pisano MD/PhD  [facility] - Physical Medicine & Rehabilitation   [facility] - Brain Injury Medicine   ____________________________________

## 2023-11-02 NOTE — THERAPY
Physical Therapy   Daily Treatment     Patient Name: Richard Hubbard II  Age:  48 y.o., Sex:  male  Medical Record #: [id number]  Today's Date: 11/2/2023     Precautions  Precautions: Fall Risk, Non Weight Bearing Left Lower Extremity, Toe Touch Weight Bearing Right Lower Extremity  Comments: Touchdown WB R LE    Subjective    Can I go check the dryer to see if my clothes are done, I think I just heard the buzzer     Objective       11/02/23 0931   PT Charge Group   PT Therapeutic Exercise (Units) 3   PT Therapeutic Activities (Units) 1   Supervising Physical Therapist Kate Bryant   PT Total Time Spent   PT Individual Total Time Spent (Mins) 60   Gait Functional Level of Assist    Gait Level Of Assist Unable to Participate   Wheelchair Functional Level of Assist   Wheelchair Assist Modified Independent   Distance Wheelchair (Feet or Distance) 150   Wheelchair Description Extra time  (UE propulsion)   Stairs Functional Level of Assist   Level of Assist with Stairs Unable to Participate   # of Stairs Climbed 0   Stairs Description Safety concerns  (B LE NWB/BKA)   Transfer Functional Level of Assist   Bed, Chair, Wheelchair Transfer Standby Assist  (to SPV)   Bed Chair Wheelchair Transfer Description Supervision for safety;Other (comment)  (lateral Scoot)   Supine Lower Body Exercise   Supine Lower Body Exercises Yes   Bridges Two Legged;3 sets of 15  (hips on bolster)   Hip Adduction  Bilateral;3 sets of 15   Short Arc Quad 3 sets of 15;Bilateral   Other Exercises scapular retraction 3 x 15, shoulder exercises including ER with shoulders at 90/90 and shoulder ER with elbows at side, blue resistance band   Bed Mobility    Supine to Sit Modified Independent   Sit to Supine Modified Independent   Sit to Stand Unable to Participate   Scooting Modified Independent   Rolling Modified Independent   Neuro-Muscular Treatments   Neuro-Muscular Treatments Anterior weight shift;Weight Shift Right;Weight Shift Left  "  Comments seated balance unsupported EOM with vc/tc for upright posture. balloon volley 2 x 15 taps with pt reaching across midline and out of ABHISHEK   Interdisciplinary Plan of Care Collaboration   IDT Collaboration with  Physician   Patient Position at End of Therapy Seated;Self Releasing Lap Belt Applied   Collaboration Comments spoke to pt during therapy   PT DME Recommendations   Additional Equipment Transfer Board (30\" Length)         Assessment    Pt remains with limited endurance and proximal weakness. Progressing transfers to SPV/Alfonzo  Strengths: Independent prior level of function, Motivated for self care and independence, Supportive family, Willingly participates in therapeutic activities, Alert and oriented  Barriers: Bladder retention, Decreased endurance, Impaired activity tolerance, Impaired balance, Impulsive, Limited mobility, Pain    Plan    Strengthening  Endurance  Mock car transfer with 30\" slideboard    Pt to dc Saturday 11/4 at wc level   DC IRF ARTURO, passport    DME  PT DME Recommendations  Additional Equipment: (P) Transfer Board (30\" Length)    Passport items to be completed:  Get in/out of bed safely, in/out of a vehicle, safely use mobility device, walk or wheel around home/community, navigate up and down stairs, show how to get up/down from the ground, ensure home is accessible, demonstrate HEP, complete caregiver training    Physical Therapy Problems (Active)       Problem: Mobility       Dates: Start:  10/28/23         Goal: STG-Within one week, patient will propel wheelchair community 200ft including 3% grade Adore       Dates: Start:  10/28/23    Expected End:  11/17/23         Goal Note filed on 10/31/23 1246 by MARIBEL Haile       NT outdoor and w/c ramps                 Problem: Mobility Transfers       Dates: Start:  10/28/23         Goal: STG-Within one week, patient will transfer bed to chair with SBA while maintaining Wbing precautions       Dates: Start:  10/28/23    " Expected End:  11/17/23         Goal Note filed on 10/31/23 1246 by Inocencia Gonzalez, MSPT       Requires CGA with slideboard transfers                 Problem: PT-Long Term Goals       Dates: Start:  10/28/23         Goal: LTG-By discharge, patient will propel wheelchair 300ft indoors and level outdoors Alfonzo       Dates: Start:  10/28/23    Expected End:  11/17/23            Goal: LTG-By discharge, patient will transfer one surface to another with set up assist while maintaining Wbing precautions       Dates: Start:  10/28/23    Expected End:  11/17/23            Goal: LTG-By discharge, patient will transfer in/out of a car with modA       Dates: Start:  10/28/23    Expected End:  11/17/23

## 2023-11-02 NOTE — PROGRESS NOTES
NURSING DAILY NOTE    Name: Richard Hubbard II   Date of Admission: 10/27/2023   Admitting Diagnosis: BKA stump complication (HCC)  Attending Physician: Keaton Pisano M.d.  Allergies: Nsaids, Diphenhydramine, Mirtazapine, Penicillins, and Promethazine    Safety  Patient Assist  min  Patient Precautions  Fall Risk, Non Weight Bearing Left Lower Extremity, Toe Touch Weight Bearing Right Lower Extremity  Precaution Comments  Touchdown WB R LE  Bed Transfer Status  Standby Assist  Toilet Transfer Status   Minimal Assist  Assistive Devices  Slide board, Wheelchair  Oxygen  None - Room Air  Diet/Therapeutic Dining  Current Diet Order   Procedures    Diet Order Diet: Regular     Pill Administration  whole  Agitated Behavioral Scale     ABS Level of Severity       Fall Risk  Has the patient had a fall this admission?   No  Osiris Healy Fall Risk Scoring  12, MODERATE RISK  Fall Risk Safety Measures  bed alarm    Vitals  Temperature: 36.5 °C (97.7 °F)  Temp src: Oral  Pulse: 94  Respiration: 19  Blood Pressure: 95/56  Blood Pressure MAP (Calculated): 69 MM HG  BP Location: Right, Upper Arm  Patient BP Position: Mast's Position     Oxygen  Pulse Oximetry: 92 %  O2 (LPM): 1  O2 Delivery Device: None - Room Air    Bowel and Bladder  Last Bowel Movement  10/31/23 (per patient)  Stool Type  Type 3: Like a sausage, but with cracks on its surface  Bowel Device  Bathroom  Continent  Bladder:  (piedra in place)   Bowel: No movement  Bladder Function  Urine Void (mL): 400 ml  Number of Times Voided: 1  Urine Color: Yellow  Genitourinary Assessment   Bladder Assessment (WDL):  WDL Except  Piedra Catheter: Present with Active Order  Urine Color: Yellow  Bladder Device: Urinal  Bladder Scan: Post Void  $ Bladder Scan Results (mL): 117    Skin  Nam Score   16  Sensory Interventions   Bed Types: Standard/Trauma Mattress  Skin Preventative Measures: Pillows in Use  for Support / Positioning  Moisture Interventions  Moisturizers/Barriers: Barrier Wipes, Antifungal Paste      Pain  Pain Rating Scale  6 - Hard to ignore, avoid usual activities  Pain Location  Leg, Hand  Pain Location Orientation  Right, Left  Pain Interventions   Medication (see MAR)    ADLs    Bathing   Shower, * * With Assistance from, Staff  Linen Change   Complete  Personal Hygiene  Moist Charlotte Wipes, Perineal Care  Chlorhexidine Bath      Oral Care     Teeth/Dentures     Shave     Nutrition Percentage Eaten  Lunch, Between % Consumed  Environmental Precautions     Patient Turns/Positioning  Patient Turns Self from Side to Side  Patient Turns Assistance/Tolerance     Bed Positions  Bed Controls On  Head of Bed Elevated  Self regulated      Psychosocial/Neurologic Assessment  Psychosocial Assessment  Psychosocial (WDL):  Within Defined Limits  Patient Behaviors: Anxious  Neurologic Assessment  Neuro (WDL): Exceptions to WDL (neuropathy)  Level of Consciousness: Alert  Orientation Level: Oriented X4  Cognition: Appropriate judgement  Speech: Clear  Pupil Assesment: No  EENT (WDL):  WDL Except    Cardio/Pulmonary Assessment  Edema   Scrotal Edema: Generalized  Respiratory Breath Sounds  RUL Breath Sounds: Clear  RML Breath Sounds: Clear  RLL Breath Sounds: Diminished  DONALD Breath Sounds: Clear  LLL Breath Sounds: Diminished  Cardiac Assessment   Cardiac (WDL):  WDL Except (hx chf)

## 2023-11-02 NOTE — FLOWSHEET NOTE
11/02/23 0709   Events/Summary/Plan   Events/Summary/Plan 02 pulse ox check   Vital Signs   Pulse 99   Respiration 18   Pulse Oximetry 96 %   $ Pulse Oximetry (Spot Check) Yes   Respiratory Assessment   Level of Consciousness Alert   Chest Exam   Work Of Breathing / Effort Within Normal Limits   Oxygen   O2 (LPM) 2   O2 Delivery Device Silicone Nasal Cannula

## 2023-11-02 NOTE — THERAPY
Physical Therapy   Daily Treatment     Patient Name: Richard Hubbard II  Age:  48 y.o., Sex:  male  Medical Record #: [id number]  Today's Date: 11/2/2023     Precautions  Precautions: Fall Risk, Non Weight Bearing Left Lower Extremity, Toe Touch Weight Bearing Right Lower Extremity  Comments: Touchdown WB R LE; 1L O2 PRN    Subjective    Patient received in w/c in restroom; agreeable to PT.     Objective       11/02/23 1231   PT Charge Group   PT Therapeutic Activities (Units) 2   PT Total Time Spent   PT Individual Total Time Spent (Mins) 30   Vitals   Respiration (!) 26  (during basic activity at bedside)   O2 (LPM) 0   O2 Delivery Device None - Room Air   Wheelchair Functional Level of Assist   Wheelchair Assist   (SPV outdoors including with ramps, IND indoors)   Distance Wheelchair (Feet or Distance) 2 reps outdoos (250 and 125 ft) and 1 rep indoors x150 ft   Wheelchair Description Extra time   Transfer Functional Level of Assist   Bed, Chair, Wheelchair Transfer Supervised   Bed Chair Wheelchair Transfer Description Adaptive equipment;Increased time;Slideboard transfer from bed to wheelchair;Set-up of equipment   Bed Mobility    Sit to Supine Modified Independent   Scooting Supervised  (Setup A with SB)   Rolling Modified Independent   Interdisciplinary Plan of Care Collaboration   Patient Position at End of Therapy In Bed;Call Light within Reach;Tray Table within Reach;Phone within Reach         Assessment    Patient has endurance limitations with outdoor w/c mobility but also reported fatigue prior to session; increased respiratory rate noted; good motivation and good safety awareness throughout.    Strengths: Independent prior level of function, Motivated for self care and independence, Supportive family, Willingly participates in therapeutic activities, Alert and oriented  Barriers: Bladder retention, Decreased endurance, Impaired activity tolerance, Impaired balance, Impulsive, Limited mobility,  "Pain    Plan    Strengthening  Endurance  Mock car transfer with 30\" slideboard     Pt to dc Saturday 11/4 at wc level   DC IRF ARTURO, passport    DME  PT DME Recommendations  Additional Equipment: Transfer Board (30\" Length)    Passport items to be completed:  Get in/out of bed safely, in/out of a vehicle, safely use mobility device, walk or wheel around home/community, navigate up and down stairs, show how to get up/down from the ground, ensure home is accessible, demonstrate HEP, complete caregiver training    Physical Therapy Problems (Active)       Problem: Mobility       Dates: Start:  10/28/23         Goal: STG-Within one week, patient will propel wheelchair community 200ft including 3% grade Adore       Dates: Start:  10/28/23    Expected End:  11/17/23         Goal Note filed on 10/31/23 1246 by MARIBEL Haile       NT outdoor and w/c ramps                 Problem: Mobility Transfers       Dates: Start:  10/28/23         Goal: STG-Within one week, patient will transfer bed to chair with SBA while maintaining Wbing precautions       Dates: Start:  10/28/23    Expected End:  11/17/23         Goal Note filed on 10/31/23 1246 by MARIBEL Haile       Requires CGA with slideboard transfers                 Problem: PT-Long Term Goals       Dates: Start:  10/28/23         Goal: LTG-By discharge, patient will propel wheelchair 300ft indoors and level outdoors Alfonzo       Dates: Start:  10/28/23    Expected End:  11/17/23            Goal: LTG-By discharge, patient will transfer one surface to another with set up assist while maintaining Wbing precautions       Dates: Start:  10/28/23    Expected End:  11/17/23            Goal: LTG-By discharge, patient will transfer in/out of a car with modA       Dates: Start:  10/28/23    Expected End:  11/17/23              "

## 2023-11-02 NOTE — THERAPY
Occupational Therapy  Daily Treatment     Patient Name: Richard Hubbard II  Age:  48 y.o., Sex:  male  Medical Record #: [id number]  Today's Date: 11/2/2023     Precautions  Precautions: Fall Risk, Non Weight Bearing Left Lower Extremity, Toe Touch Weight Bearing Right Lower Extremity  Comments: Touchdown WB R LE; 1L O2 PRN    Subjective    Pt pleasant and motivated to participate in OT; requesting to go outside     Objective     11/02/23 1431   OT Charge Group   Charges Yes   OT Self Care / ADL (Units) 1   OT Therapy Activity (Units) 1   OT Total Time Spent   OT Individual Total Time Spent (Mins) 30   Precautions   Precautions Fall Risk;Non Weight Bearing Left Lower Extremity;Toe Touch Weight Bearing Right Lower Extremity   Comments Touchdown WB R LE; 1L O2 PRN   Vitals   O2 Delivery Device None - Room Air   Pain   Intervention Declines   Pain 0 - 10 Group   Location Scrotum   Description Other (Comments)  (Pt reports having a rash that is causing discomfort)   Cognition    Level of Consciousness Alert   ABS (Agitated Behavior Scale)   Agitated Behavior Scale Performed No   Sleep/Wake Cycle   Sleep & Rest Awake   Vision Screen   Vision   (Pt wears glasses)   Functional Level of Assist   Grooming Modified Independent;Seated   Grooming Description Increased time;Seated in wheelchair at sink;Verbal cueing   Toileting Supervision   Toileting Description   (Pt used urinal while seated in w/c in pt bathroom)   Bed, Chair, Wheelchair Transfer Standby Assist   Bed Chair Wheelchair Transfer Description Increased time;Initial preparation for task;Slideboard transfer from bed to wheelchair;Set-up of equipment;Supervision for safety   Bed Mobility    Supine to Sit Modified Independent   Scooting Modified Independent   Rolling Modified Independent   Interdisciplinary Plan of Care Collaboration   Patient Position at End of Therapy Seated;Chair Alarm On;Call Light within Reach;Tray Table within Reach;Phone within Reach   OT  "DME Recommendations   Additional Equipment Transfer Board (30\" Length)   Strengths & Barriers   Strengths Able to follow instructions;Adequate strength;Independent prior level of function;Motivated for self care and independence;Pleasant and cooperative;Willingly participates in therapeutic activities;Supportive family   Barriers Decreased endurance;Fatigue;Generalized weakness;Impaired activity tolerance;Impaired balance;Limited mobility;Pain     Pt tolerated outside w/c mobility w/ min A on sidewalk slope d/t slope being steep. Provided two rest breaks d/t fatigue.    Assessment    Pt seen for tx, addressing toileting routine and outside w/c mobility. Pt tolerated activity fair d/t pain and fatigue. Continue OT POC.    Strengths: Able to follow instructions, Adequate strength, Independent prior level of function, Motivated for self care and independence, Pleasant and cooperative, Willingly participates in therapeutic activities, Supportive family    Barriers: Decreased endurance, Fatigue, Generalized weakness, Impaired activity tolerance, Impaired balance, Limited mobility, Pain    Plan    I/ADLs  Safety w/ transfers  Strength/Endurance    DME  OT DME Recommendations  Additional Equipment: Transfer Board (30\" Length)    Passport items to be completed:  Perform bathroom transfers, complete dressing, complete feeding, get ready for the day, prepare a simple meal, participate in household tasks, adapt home for safety needs, demonstrate home exercise program, complete caregiver training     Occupational Therapy Goals (Active)       Problem: IADL's       Dates: Start:  10/28/23         Goal: STG-Within one week, patient will prepare a meal at w/c level w/ SBA       Dates: Start:  10/28/23    Expected End:  11/17/23         Goal Note filed on 10/31/23 1151 by Binu Keen, OT/L       Not yet attempted              Goal: STG-Within one week, patient will utilize washer and dryer at w/c level w/ SBA and AE as needed  "      Dates: Start:  10/28/23    Expected End:  11/17/23         Goal Note filed on 10/31/23 1151 by Binu Keen, OT/L       Not yet attempted                 Problem: OT Long Term Goals       Dates: Start:  10/28/23         Goal: LTG-By discharge, patient will complete basic self care tasks w/ mod I and AE/DME as needed       Dates: Start:  10/28/23    Expected End:  11/17/23            Goal: LTG-By discharge, patient will complete basic home management w/ mod I and LRAD as needed       Dates: Start:  10/28/23    Expected End:  11/17/23               Problem: Toileting       Dates: Start:  10/28/23         Goal: STG-Within one week, patient will complete toileting tasks w/ SBA and DME as needed       Dates: Start:  10/28/23    Expected End:  11/17/23         Goal Note filed on 10/31/23 1151 by Binu Keen, OT/L       CGA

## 2023-11-02 NOTE — PROGRESS NOTES
NURSING DAILY NOTE    Name: Richard Hubbard II   Date of Admission: 10/27/2023   Admitting Diagnosis: BKA stump complication (HCC)  Attending Physician: Keaton Pisano M.d.  Allergies: Nsaids, Diphenhydramine, Mirtazapine, Penicillins, and Promethazine    Safety  Patient Assist  min  Patient Precautions  Fall Risk, Non Weight Bearing Left Lower Extremity, Toe Touch Weight Bearing Right Lower Extremity  Precaution Comments  Touchdown WB R LE  Bed Transfer Status  Standby Assist  Toilet Transfer Status   Minimal Assist  Assistive Devices  Slide board, Wheelchair  Oxygen  None - Room Air  Diet/Therapeutic Dining  Current Diet Order   Procedures    Diet Order Diet: Regular     Pill Administration  whole  Agitated Behavioral Scale     ABS Level of Severity       Fall Risk  Has the patient had a fall this admission?   No  Osiris Healy Fall Risk Scoring  12, MODERATE RISK  Fall Risk Safety Measures  ok to leave pt in bathroom and ok to leave pt in shower    Vitals  Temperature: 36.7 °C (98.1 °F)  Temp src: Oral  Pulse: 94  Respiration: (!) 95  Blood Pressure: 109/69  Blood Pressure MAP (Calculated): 82 MM HG  BP Location: Right, Upper Arm  Patient BP Position: Mast's Position     Oxygen  Pulse Oximetry: 94 %  O2 (LPM): 1  O2 Delivery Device: None - Room Air    Bowel and Bladder  Last Bowel Movement  11/01/23  Stool Type  Type 3: Like a sausage, but with cracks on its surface  Bowel Device  Bathroom  Continent  Bladder:  (piedra in place)   Bowel: No movement  Bladder Function  Urine Void (mL): 900 ml  Number of Times Voided: 2  Urine Color: Yellow  Genitourinary Assessment   Bladder Assessment (WDL):  WDL Except  Piedra Catheter: Present with Active Order  Urine Color: Yellow  Bladder Device: Urinal  Bladder Scan: Post Void  $ Bladder Scan Results (mL): 117    Skin  Nam Score   16  Sensory Interventions   Bed Types: Standard/Trauma Mattress with  Overlay  Skin Preventative Measures: Pillows in Use for Support / Positioning, Remove Glasses While Patient is Sleeping  Moisture Interventions  Moisturizers/Barriers: Antifungal Paste, Barrier Wipes      Pain  Pain Rating Scale  7 - Focus of attention, prevents doing daily activities  Pain Location  Leg, Hand  Pain Location Orientation  Right, Left  Pain Interventions   Medication (see MAR)    ADLs    Bathing   Shower, * * With Assistance from, Staff  Linen Change   Complete  Personal Hygiene  Moist Charlotte Wipes, Perineal Care  Chlorhexidine Bath      Oral Care     Teeth/Dentures     Shave     Nutrition Percentage Eaten  Lunch, Between % Consumed  Environmental Precautions     Patient Turns/Positioning  Patient Turns Self from Side to Side  Patient Turns Assistance/Tolerance     Bed Positions  Bed Controls On  Head of Bed Elevated  Self regulated      Psychosocial/Neurologic Assessment  Psychosocial Assessment  Psychosocial (WDL):  Within Defined Limits  Patient Behaviors: Anxious  Neurologic Assessment  Neuro (WDL): Exceptions to WDL (neuropathy)  Level of Consciousness: Alert  Orientation Level: Oriented X4  Cognition: Appropriate judgement  Speech: Clear  Pupil Assesment: No  EENT (WDL):  WDL Except    Cardio/Pulmonary Assessment  Edema   Scrotal Edema: Generalized  Respiratory Breath Sounds  RUL Breath Sounds: Clear  RML Breath Sounds: Clear  RLL Breath Sounds: Diminished  DONALD Breath Sounds: Clear  LLL Breath Sounds: Diminished  Cardiac Assessment   Cardiac (WDL):  WDL Except (hx chf)

## 2023-11-02 NOTE — CARE PLAN
Problem: Bowel Elimination  Goal: Patient will participate in bowel management program  Outcome: Progressing  Note: Patient uses bathroom during bowel elimination. Will continue to monitor    The patient is Stable - Low risk of patient condition declining or worsening    Shift Goals  Clinical Goals: safety, pain management  Patient Goals: safety, pain management  Family Goals: JI    Progress made toward(s) clinical / shift goals:  pt continent and having regular bowel movements    Patient is not progressing towards the following goals:

## 2023-11-03 PROBLEM — R65.21 SEPTIC SHOCK (HCC): Status: RESOLVED | Noted: 2023-01-01 | Resolved: 2023-01-01

## 2023-11-03 PROBLEM — A41.9 SEPTIC SHOCK (HCC): Status: RESOLVED | Noted: 2023-01-01 | Resolved: 2023-01-01

## 2023-11-03 NOTE — FLOWSHEET NOTE
11/03/23 1116   Events/Summary/Plan   Events/Summary/Plan SpO2 check   Vital Signs   Pulse (!) 150  (pt acknowledges A-fib with occasional RVR)   Respiration 18   Pulse Oximetry 95 %   $ Pulse Oximetry (Spot Check) Yes   Respiratory Assessment   Respiratory Pattern Within Normal Limits   Chest Exam   Work Of Breathing / Effort Mild   Oxygen   O2 Delivery Device None - Room Air

## 2023-11-03 NOTE — PROGRESS NOTES
NURSING DAILY NOTE    Name: Richard Hubbard II   Date of Admission: 10/27/2023   Admitting Diagnosis: BKA stump complication (HCC)  Attending Physician: Keaton Pisano M.d.  Allergies: Nsaids, Diphenhydramine, Mirtazapine, Penicillins, and Promethazine    Safety  Patient Assist  min  Patient Precautions  Fall Risk, Non Weight Bearing Left Lower Extremity, Toe Touch Weight Bearing Right Lower Extremity  Precaution Comments  Touchdown WB R LE; 1L O2 PRN  Bed Transfer Status  Standby Assist  Toilet Transfer Status   Minimal Assist  Assistive Devices  Slide board, Wheelchair  Oxygen  None - Room Air  Diet/Therapeutic Dining  Current Diet Order   Procedures    Diet Order Diet: Regular     Pill Administration  whole  Agitated Behavioral Scale     ABS Level of Severity       Fall Risk  Has the patient had a fall this admission?   No  Osiris Healy Fall Risk Scoring  12, MODERATE RISK  Fall Risk Safety Measures  bed alarm, poor balance, and left BKA    Vitals  Temperature: 36.4 °C (97.5 °F)  Temp src: Oral  Pulse: 98  Respiration: 19  Blood Pressure: 105/66  Blood Pressure MAP (Calculated): 79 MM HG  BP Location: Right, Upper Arm  Patient BP Position: Sitting     Oxygen  Pulse Oximetry: 92 %  O2 (LPM): 0  O2 Delivery Device: None - Room Air    Bowel and Bladder  Last Bowel Movement  11/01/23  Stool Type  Type 3: Like a sausage, but with cracks on its surface  Bowel Device  Bathroom  Continent  Bladder:  (piedra in place)   Bowel: No movement  Bladder Function  Urine Void (mL): 350 ml  Number of Times Voided: 2  Urine Color: Yellow  Genitourinary Assessment   Bladder Assessment (WDL):  WDL Except  Piedra Catheter: Present with Active Order  Urine Color: Yellow  Bladder Device: Urinal  Bladder Scan: Post Void  $ Bladder Scan Results (mL): 117    Skin  Nam Score   16  Sensory Interventions   Bed Types: Standard/Trauma Mattress  Skin Preventative Measures:  Pillows in Use for Support / Positioning  Moisture Interventions  Moisturizers/Barriers: Antifungal Paste, Barrier Wipes      Pain  Pain Rating Scale  8 - Awful, hard to do anything  Pain Location  Scrotum  Pain Location Orientation  Right, Left  Pain Interventions   Declines    ADLs    Bathing   Shower, * * With Assistance from, Staff  Linen Change   Partial  Personal Hygiene  Moist Charlotte Wipes, Perineal Care  Chlorhexidine Bath      Oral Care     Teeth/Dentures     Shave     Nutrition Percentage Eaten  Lunch, Between % Consumed  Environmental Precautions     Patient Turns/Positioning  Patient Turns Self from Side to Side  Patient Turns Assistance/Tolerance     Bed Positions  Bed Controls On  Head of Bed Elevated  Self regulated      Psychosocial/Neurologic Assessment  Psychosocial Assessment  Psychosocial (WDL):  Within Defined Limits  Patient Behaviors: Anxious  Neurologic Assessment  Neuro (WDL): Exceptions to WDL (neuropathy)  Level of Consciousness: Alert  Orientation Level: Oriented X4  Cognition: Appropriate judgement  Speech: Clear  Pupil Assesment: No  EENT (WDL):  WDL Except    Cardio/Pulmonary Assessment  Edema   Scrotal Edema: Generalized  Respiratory Breath Sounds  RUL Breath Sounds: Clear  RML Breath Sounds: Clear  RLL Breath Sounds: Diminished  DONALD Breath Sounds: Clear  LLL Breath Sounds: Diminished  Cardiac Assessment   Cardiac (WDL):  WDL Except (hx chf)

## 2023-11-03 NOTE — CARE PLAN
"The patient is Stable - Low risk of patient condition declining or worsening    Shift Goals  Clinical Goals: safety, pain management  Patient Goals: safety, pain management  Family Goals: JI    Progress made toward(s) clinical / shift goals:      Problem: Pain - Standard  Goal: Alleviation of pain or a reduction in pain to the patient’s comfort goal  Outcome: Progressing  Note: Pain meds given as scheduled and PRN per MAR for c/o left BKA/phantom pain with moderate relief. Pt sleeps off and on despite PRN ambien     Problem: Fall Risk - Rehab  Goal: Patient will remain free from falls  Outcome: Progressing  Note: Osiris Healy Fall risk Assessment Score: 12    Moderate fall risk Interventions  - Bed and strip alarm   - Yellow sign by the door   - Yellow wrist band \"Fall risk\"  - Room near to the nurse station  - Do not leave patient unattended in the bathroom  - Fall risk education provided         Patient is not progressing towards the following goals:      "

## 2023-11-03 NOTE — DISCHARGE PLANNING
"CM  30\" slide board ordered through [employer] thru [employer]; order faxed via [employer].     "

## 2023-11-03 NOTE — DISCHARGE INSTRUCTIONS
[facility] NURSING DISCHARGE INSTRUCTIONS    Blood Pressure: 102/64  Weight: 93 kg (205 lb 0.4 oz)  Nursing recommendations for Richard Salinas Stevie AMATO at time of discharge are as follows:  Client and Family Member verbalized understanding of all discharge instructions and prescriptions.     Review all your home medications and newly ordered medications with your doctor and/or pharmacist. Follow medication instructions as directed by your doctor and/or pharmacist.    Pain Management:   Discharge Pain Medication Instructions:  Comfort Goal: Comfort with Movement, Sleep Comfortably  Notify your primary care provider if pain is unrelieved with these measures, if the pain is new, or increased in intensity.    Discharge Skin Characteristics: Warm, Dry  Discharge Skin Exam: Other (Comments) (left BKA with sutures and dressing; redness on bilateral groin & scrotum)  Moisture Associated Skin Damage 08/20/23 Groin;Buttock;Perineum;Thigh (Active)   Wound Image   10/28/23 1100   Drainage Amount None 11/03/23 2100   Periwound Assessment Red 11/03/23 0849   Periwound Protectant Antifungal Therapy 11/03/23 0849   WOUND NURSE ONLY - Time Spent with Patient (mins) 30 10/28/23 1100       Wound 08/20/23 Diabetic Ulcer Plantar Right midfoot (Active)   Wound Image    10/28/23 1100   Site Assessment JI 11/02/23 2130   Periwound Assessment Intact 10/28/23 2045   Margins Defined edges;Unattached edges 11/03/23 0849   Closure Open to air 10/28/23 2045   Drainage Amount Scant 10/28/23 2045   Drainage Description Serous 10/28/23 2045   Treatments Cleansed;Site care 10/28/23 1100   Wound Cleansing Approved Wound Cleanser 10/28/23 1100   Periwound Protectant Skin Protectant Wipes to Periwound 10/28/23 1100   Dressing Status Clean;Dry;Intact 10/31/23 0700   Dressing Changed Changed 10/28/23 1100   Dressing Cleansing/Solutions Not Applicable 10/28/23 1100   Dressing Options Hydrofiber Silver;Silicone Adhesive Foam  10/31/23 0700   Dressing Change/Treatment Frequency Every 72 hrs, and As Needed 11/03/23 0849   NEXT Dressing Change/Treatment Date 10/31/23 10/28/23 1100   Non-staged Wound Description Full thickness 10/28/23 1100   Wound Length (cm) 0.8 cm 10/28/23 1100   Wound Width (cm) 0.3 cm 10/28/23 1100   Wound Depth (cm) 0.4 cm 10/28/23 1100   Wound Surface Area (cm^2) 0.24 cm^2 10/28/23 1100   Wound Volume (cm^3) 0.096 cm^3 10/28/23 1100   Wound Healing % -860 10/28/23 1100   Shape circular 10/28/23 1100   Wound Odor None 10/28/23 1100   Exposed Structures None 10/28/23 1100   WOUND NURSE ONLY - Time Spent with Patient (mins) 30 10/28/23 1100       Wound 10/19/23 Incision Leg Left (Active)   Wound Image   10/28/23 1100   Site Assessment Clean;Dry;Pink 11/03/23 0849   Periwound Assessment Intact 10/28/23 2045   Margins Defined edges;Attached edges 11/03/23 0849   Closure Sutures 11/03/23 0849   Drainage Amount Scant 11/03/23 0849   Drainage Description Serosanguineous 11/03/23 0849   Treatments Cleansed;Site care;Offloading 11/03/23 0849   Wound Cleansing Approved Wound Cleanser 10/28/23 1100   Periwound Protectant Skin Protectant Wipes to Periwound 11/03/23 0849   Dressing Status Clean;Dry;Intact;Removed 11/03/23 0849   Dressing Changed Changed 11/03/23 0849   Dressing Cleansing/Solutions Not Applicable 10/28/23 1100   Dressing Options Hydrofiber Silver;Island Dressing 10/31/23 0700   Dressing Change/Treatment Frequency Every 72 hrs, and As Needed 11/03/23 0849   NEXT Dressing Change/Treatment Date 11/03/23 10/31/23 2017   Wound Team Following Not following 10/28/23 1100   Non-staged Wound Description Not applicable 10/28/23 1100       Wound 10/27/23 Elbow Left Left arm swelling (Active)   Site Assessment Clean;Dry;Intact 11/03/23 0849   Periwound Assessment Clean;Dry;Intact 11/03/23 0849   Closure Open to air 11/03/23 0849   Drainage Amount None 11/03/23 0849       Wound 10/27/23 Plantar Right Plantar wound (Active)    Site Assessment Clean;Dry;Red 11/03/23 0849   Periwound Assessment Intact;Clean;Dry 11/03/23 0849   Margins Unattached edges 11/03/23 0849   Closure Adhesive bandage 11/03/23 0849   Drainage Amount Scant 11/03/23 0849   Drainage Description Serosanguineous 11/03/23 0849   Treatments Cleansed;Site care;Offloading 11/03/23 0849   Dressing Status Clean;Dry;Intact 11/03/23 0849   Dressing Changed Changed 11/03/23 0849     Skin / Wound Care Instructions: Please contact your primary care physician for any change in skin integrity.     If You Have Surgical Incisions / Wounds:  Monitor surgical site(s) for signs of increased swelling, redness or symptoms of drainage from the site or fever as this could indicate signs and symptoms of infection. If these symptoms are noted, notifiy your primary care provider.      Discharge Safety Instructions: Should Not Be Left Alone In The House     Discharge Safety Concerns: Unsteady Gait, Balance Problems (Dizziness, Light Headedness), Weakness, History Of Falls  The interdisciplinary team has made recommendation that you should not be left alone  in the house due to balance problem, history of falls, weakness, and unsteady gait  Anti-embolic stockings are not required to increase circulation to the lower extremities.    Discharge Diet: Regular     Discharge Liquids: Thin  Discharge Bowel Function: Continent  Please contact your primary care physician for any changes in bowel habits.  Discharge Bowel Program:    Discharge Bladder Function: Continent  Discharge Urinary Devices:        Nursing Discharge Plan:   Influenza Vaccine Indication: Patient Refuses, Indicated: 9 to 64 years of age    Case Management Discharge Instructions:   Discharge Location:    Agency Name/Address/Phone:    Home Health:    Outpatient Services:    DME Provider/Phone:    Medical Equipment Ordered:    Prescription Faxed to:        Discharge Medication Instructions:  Below are the medications your physician  expects you to take upon discharge:    Leg Amputation, Care After  The following information offers guidance on how to care for yourself after your procedure. Your health care provider may also give you more specific instructions. If you have problems or questions, contact your health care provider.  What can I expect after the procedure?  After the procedure, it is common to have:  A little blood or fluid coming from your incision.  Pain from your incision.  Pain that feels like it is coming from the leg that has been removed (phantom pain). This can last for a year or longer.  Skin breakdown on your stump (residual limb).  Feelings of depression, anxiety, and fear.  Follow these instructions at home:  Medicines  Take over-the-counter and prescription medicines only as told by your health care provider.  If you were prescribed an antibiotic medicine, take it as told by your health care provider. Do not stop taking the antibiotic even if you start to feel better.  Ask your health care provider if the medicine prescribed to you:  Requires you to avoid driving or using machinery.  Can cause constipation. You may need to take these actions to prevent or treat constipation:  Take over-the-counter or prescription medicines.  Eat foods that are high in fiber, such as beans, whole grains, and fresh fruits and vegetables.  Limit foods that are high in fat and processed sugars, such as fried or sweet foods.  Bathing  Do not take baths, swim, use a hot tub, or get your residual limb wet until your health care provider approves. You may only be allowed to take sponge baths.  Ask your health care provider when you may start taking showers. After taking a shower, make sure to rinse and dry your residual limb carefully.  Incision care    Check your residual limb, especially your incision area, every day. Check for:  Blisters.  Scrapes.  Signs of infection, such as:  More redness, swelling, or pain.  More fluid or  blood.  Warmth.  Pus or a bad smell.  Follow instructions from your health care provider about how to take care of your incision. Make sure you:  Wash your hands with soap and water for at least 20 seconds before and after you change your bandage (dressing). If soap and water are not available, use hand .  Change your dressing as told by your health care provider.  Leave stitches (sutures), staples, skin glue, or adhesive strips in place. These skin closures may need to stay in place for 2 weeks or longer. If adhesive strip edges start to loosen and curl up, you may trim the loose edges. Do not remove adhesive strips completely unless your health care provider tells you to do that.  Activity  Return to your normal activities as told by your health care provider. Ask your health care provider what activities are safe for you.  Do physical therapy exercises as told by your health care provider.  If you have been fitted with an artificial leg (prosthesis) or have been given crutches or another assistive device, use them as told by your health care provider.  Eating and drinking  Eat a healthy diet that includes whole grains, fruits and vegetables, low-fat dairy products, and lean proteins.  Drink enough fluid to keep your urine pale yellow.  General instructions  Do not use oils, lotion, cream, or rubbing alcohol on the remaining part of your leg.  Wear compression stockings as told by your health care provider. These stockings help to prevent blood clots and reduce swelling in your legs.  Work with an occupational therapist to learn new strategies for safe driving with an amputation.  If you have trouble coping with your amputation, contact your health care provider. Some feelings of depression, anxiety, or fear are normal after an amputation, but if you struggle with these feelings or if they get overwhelming, your provider may be able to recommend a therapist or support group to help you.  Do not use any  products that contain nicotine or tobacco. These products include cigarettes, chewing tobacco, and vaping devices, such as e-cigarettes. These can delay bone and wound healing. If you need help quitting, ask your health care provider.  Keep all follow-up visits. This is important.  Contact a health care provider if:  You have a fever.  You have more tenderness in your residual limb.  You have a rash or itchy skin.  You have a cough or chills and you feel achy and weak.  You have trouble coping with your amputation.  You have blisters or scrapes on your residual limb.  You have any of these signs of infection:  More redness, swelling, or pain around your incision.  More fluid or blood coming from your incision.  The incision feels warm to the touch, tender, and painful.  Pus or a bad smell coming from your incision.  Get help right away if:  You have severe pain in your residual limb.  You feel light-headed and have shortness of breath.  You have blood-soaked bandages.  You have chest pain or pain when taking a deep breath or coughing.  These symptoms may represent a serious problem that is an emergency. Do not wait to see if the symptoms will go away. Get medical help right away. Call your local emergency services (962 in the [place] Do not drive yourself to the hospital.  If you ever feel like you may hurt yourself or others, or have thoughts about taking your own life, get help right away. Go to your nearest emergency department or:  Call your local emergency services (800 in the [place]  Call a suicide crisis helpline, such as the National Suicide Prevention Lifeline at 1-262.946.7232 or 064 in the [place] This is open 24 hours a day in the [place]  Text the Crisis Text Line at 057982 (in the [place]  Summary  After a leg amputation, you may have pain that feels like it is coming from the leg that was removed (phantom pain). This can last for a year or longer.  Follow instructions from your health care provider about how  "to take care of your incision.  Check your residual limb, especially your incision area, every day. More redness, swelling, or pain may be a sign of infection.  Contact your health care provider if you have trouble coping with your amputation.  This information is not intended to replace advice given to you by your health care provider. Make sure you discuss any questions you have with your health care provider.  Document Revised: 07/13/2022 Document Reviewed: 04/21/2022  [employer] Patient Education © 2023 [employer].    Prevent Falls in Your Home    \"Falling once doubles your chance of falling again\"        -Center for Disease Control and Prevention    Falls in the home can lead to serious injury (fractures, brain injuries), hospitalizations, increased medical costs, and could even be fatal.  The good news is, there are many precautions you can take to avoid falls in your home and help keep you safe:     If prescribed an assistive device (walker, crutches), use as instructed by the healthcare provider\"   Remove any tripping hazards from your home, including loose cords, throw rugs and clutter  Keep a nightlight on in dark (hallways, bathrooms, etc)   Get up slowly, to make sure you feel okay before getting up  Be aware of any side effects of your medications: some medications may make you dizzy  Place a non-skid rubber mat in your shower or tub-consider a shower bench or chair if unsteady on your feet  Wear supportive shoes or non-skid socks when moving around  Start an exercise program once approved by your provider.  If you are feeling weak following a hospital stay, talk to your doctor about home health or outpatient therapy programs designed to help rebuild your strength and endurance          Occupational Therapy Discharge Instructions for Richard Hubbard II    11/3/2023    Level of Assist Required for Eating: Able to Complete Eating without Assist  Level of Assist Required for Grooming: Able to " Complete Grooming without Assist  Level of Assist Required for Dressing: Able to Complete Dressing without Assist  Level of Assist Required for Toileting: Requires Supervision with Toileting  Level of Assist Required for Toilet Transfer: Requires Supervision with Toilet Transfer  Equipment for Toilet Transfer: Commode over Toilet, Commode at Bedside  Level of Assist Required for Bathing: Requires Supervision with Bathing  Equipment for Bathing: Tub Transfer Bench, Grab Bars in Tub / Shower, Hand Held Shower Head  Level of Assist Required for Shower Transfer: Requires Supervision with Shower Transfer  Equipment for Shower Transfer: Tub Transfer Bench, Grab Bars in Tub / Shower  Level of Assist Required for Home Mgmt: Requires Physical Assist with Home Management  Level of Assist Required for Meal Prep: Requires Physical Assist with Meal Preparation  Driving: May not Drive, Please Contact Physician for Further Information  Home Exercise Program: Refer to Home Exercise Program Handout for Details (issued during previous rehab stays)

## 2023-11-03 NOTE — PROGRESS NOTES
"  Physical Medicine & Rehabilitation Progress Note    Encounter Date: 11/3/2023    Chief Complaint: Decreased mobility, leg pain    Interval Events (Subjective):  Patient sitting up in room getting trained for home antibiotics. He reports he feels like he can go home tomorrow. He reports pain is controlled. Reviewed AM labs and ongoing anemia.     _____________________________________  Interdisciplinary Team Conference   Most recent IDT on 10/31/2023    Discharge Date/Disposition:  11/4/23  _____________________________________    Objective:  VITAL SIGNS: /55   Pulse (!) 150 Comment: pt acknowledges A-fib with occasional RVR  Temp 36.3 °C (97.4 °F) (Temporal)   Resp 18   Ht 1.803 m (5' 11\")   Wt 93 kg (205 lb 0.4 oz)   SpO2 95%   BMI 28.60 kg/m²   Gen: NAD  Psych: Mood and affect appropriate  CV: Irregularly Irregular, 1+ edema  Resp: CTAB, no upper airway sounds  Abd: NTND  Neuro: AOx4, following commands    Laboratory Values:  Recent Results (from the past 72 hour(s))   CBC WITH DIFFERENTIAL    Collection Time: 11/03/23  5:30 AM   Result Value Ref Range    WBC 5.9 4.8 - 10.8 K/uL    RBC 3.05 (L) 4.70 - 6.10 M/uL    Hemoglobin 8.4 (L) 14.0 - 18.0 g/dL    Hematocrit 28.1 (L) 42.0 - 52.0 %    MCV 92.1 81.4 - 97.8 fL    MCH 27.5 27.0 - 33.0 pg    MCHC 29.9 (L) 32.3 - 36.5 g/dL    RDW 56.4 (H) 35.9 - 50.0 fL    Platelet Count 219 164 - 446 K/uL    MPV 10.2 9.0 - 12.9 fL    Neutrophils-Polys 69.40 44.00 - 72.00 %    Lymphocytes 20.40 (L) 22.00 - 41.00 %    Monocytes 7.40 0.00 - 13.40 %    Eosinophils 1.50 0.00 - 6.90 %    Basophils 0.80 0.00 - 1.80 %    Immature Granulocytes 0.50 0.00 - 0.90 %    Nucleated RBC 0.00 0.00 - 0.20 /100 WBC    Neutrophils (Absolute) 4.11 1.82 - 7.42 K/uL    Lymphs (Absolute) 1.21 1.00 - 4.80 K/uL    Monos (Absolute) 0.44 0.00 - 0.85 K/uL    Eos (Absolute) 0.09 0.00 - 0.51 K/uL    Baso (Absolute) 0.05 0.00 - 0.12 K/uL    Immature Granulocytes (abs) 0.03 0.00 - 0.11 K/uL    NRBC " (Absolute) 0.00 K/uL    Anisocytosis 1+     Macrocytosis 1+    Basic Metabolic Panel    Collection Time: 11/03/23  5:30 AM   Result Value Ref Range    Sodium 139 135 - 145 mmol/L    Potassium 4.6 3.6 - 5.5 mmol/L    Chloride 105 96 - 112 mmol/L    Co2 25 20 - 33 mmol/L    Glucose 93 65 - 99 mg/dL    Bun 11 8 - 22 mg/dL    Creatinine 0.57 0.50 - 1.40 mg/dL    Calcium 8.4 (L) 8.5 - 10.5 mg/dL    Anion Gap 9.0 7.0 - 16.0   MAGNESIUM    Collection Time: 11/03/23  5:30 AM   Result Value Ref Range    Magnesium 2.0 1.5 - 2.5 mg/dL   CRP QUANTITIVE (NON-CARDIAC)    Collection Time: 11/03/23  5:30 AM   Result Value Ref Range    Stat C-Reactive Protein 0.99 (H) 0.00 - 0.75 mg/dL   Sed Rate    Collection Time: 11/03/23  5:30 AM   Result Value Ref Range    Sed Rate Westergren 113 (H) 0 - 20 mm/hour   ESTIMATED GFR    Collection Time: 11/03/23  5:30 AM   Result Value Ref Range    GFR (CKD-EPI) 121 >60 mL/min/1.73 m 2   PLATELET ESTIMATE    Collection Time: 11/03/23  5:30 AM   Result Value Ref Range    Plt Estimation Normal    MORPHOLOGY    Collection Time: 11/03/23  5:30 AM   Result Value Ref Range    RBC Morphology Present     Poikilocytosis 1+     Ovalocytes 1+    PERIPHERAL SMEAR REVIEW    Collection Time: 11/03/23  5:30 AM   Result Value Ref Range    Peripheral Smear Review see below    DIFFERENTIAL COMMENT    Collection Time: 11/03/23  5:30 AM   Result Value Ref Range    Comments-Diff see below        Medications:  Scheduled Medications   Medication Dose Frequency    fluconazole  100 mg DAILY    methylPREDNISolone  4 mg 4X/DAY WITH MEALS + NIGHTLY    Followed by    [START ON 11/4/2023] methylPREDNISolone  4 mg TID WITH MEALS    Followed by    [START ON 11/5/2023] methylPREDNISolone  4 mg BID WITH MEALS    ferrous sulfate  325 mg QDAY with Breakfast    vitamin D3  2,000 Units DAILY    miconazole   BID    Pharmacy Consult Request  1 Each PHARMACY TO DOSE    senna-docusate  2 Tablet BID    ceFAZolin  2 g Q8HRS    DULoxetine   60 mg DAILY    enoxaparin (LOVENOX) injection  40 mg DAILY AT 1800    morphine  30 mg Q6HRS    morphine ER  15 mg Q12HRS    pregabalin  150 mg TID    omeprazole  20 mg BID    normal saline (PF)  20 mL Q12HRS     PRN medications: albuterol, mineral oil-pet hydrophilic, Respiratory Therapy Consult, hydrALAZINE, acetaminophen, senna-docusate **AND** polyethylene glycol/lytes **AND** magnesium hydroxide **AND** bisacodyl, mag hydrox-al hydrox-simeth, ondansetron **OR** ondansetron, traZODone, sodium chloride, oxyCODONE immediate-release **OR** oxyCODONE immediate-release, traMADol, zolpidem    Diet:  Current Diet Order   Procedures    Diet Order Diet: Regular       Medical Decision Making and Plan:  BKA abscess - Patient s/p I&D and Revision for abscess and possible osteomyelitis. Required revision for osteomyelitis    -PT and OT for mobility and ADLs. Per guidelines, 15 hours per week between PT, OT and/or SLP.  -Follow-up Orthopedics. NWB LLE, touch down weight bearing RLE for charcot joint     MSSA bacteremia - Patient on Ancef 2g q8 until 11/19/23 per ID. Continue Ancef      Anemia - Check AM CBC - 9.8, will recheck 10/31/23 w Fe panel. Repeat 9.0, low Fe and Fe sat. Start on PO Fe.      A Fib - Patient has history of A fib.     Thrombocytopenia  - Check AM CBC - 170, monitor. Will repeat       Depression - Patient on Cymbalta      Neuropathy - Patient on Cymbalta 60 mg and Lyrica 150 mg. Continue Cymbalta and Lyrica. Cr stable on 11/3 labs     Pain - Patient on PRN Tylenol, Oxycodone, Lyrica 150 mg TID, and Morphine 30 mg q6. Ongoing pain, continue Morphine 30 mg q6h     Skin - Patient at risk for skin breakdown due to debility in areas including sacrum, achilles, elbows and head in addition to other sites. Nursing to assess skin daily.   -Psoriasis rashes - start Medrol dose yahaira. Continue steroids as improving arm rashes  -Groin rash not improving with nystatin. Will start Fluconazole.     Vitamin D deficiency -16  on admission, start 2000 U     Insomnia - On Ambien 10 mg daily    GI Ppx - Patient on Prilosec for GERD prophylaxis. Patient on Senna-docusate for constipation prophylaxis.      DVT Ppx - Patient Lovenox on transfer. Continue Lovenox  ____________________________________    T. Loi Pisano MD/PhD  [facility] - Physical Medicine & Rehabilitation   [facility] - Brain Injury Medicine   ____________________________________

## 2023-11-03 NOTE — DISCHARGE SUMMARY
Physical Medicine & Rehabilitation Discharge Summary    Admission Date: 10/27/2023    Discharge Date: 11/4/2023     Attending Provider: Dr. Charlie DO for Keaton Pisano MD/PhD    Admission Diagnosis:   Active Hospital Problems    Diagnosis     *BKA stump complication (HCC)     Class 1 obesity due to excess calories without serious comorbidity with body mass index (BMI) of 32.0 to 32.9 in adult     Bacteremia due to Staphylococcus aureus     Acquired planovalgus deformity of right foot     Septic shock, MSSA bacteremia, L BKA stump abscess (HCC)     MRSA bacteremia     Chronic pain syndrome        Discharge Diagnosis:  Active Hospital Problems    Diagnosis     *BKA stump complication (HCC)     Class 1 obesity due to excess calories without serious comorbidity with body mass index (BMI) of 32.0 to 32.9 in adult     Bacteremia due to Staphylococcus aureus     Acquired planovalgus deformity of right foot     Septic shock, MSSA bacteremia, L BKA stump abscess (HCC)     MRSA bacteremia     Chronic pain syndrome        HPI per Admission History & Physical:  The patient is a 48 y.o. male with a past medical history of  L BKA, RA on Humira , CHF, chronic pain, R charcot foot (followed by Dr. Calvin Pryor) and history of gastric bypass surgery;  who presented on 10/18/2023  8:32 PM with sepsis due to L BKA infection.  Per documentation, patient presented to the ED with AMS and lethargy. Work up revealed WBC 30, and CT LLE showed an enhancing fluid collection in the distal end of the L BKA consistent with osteomyelitis. Ortho and ID were consulted. Patient was started on IV fluids and empiric IV abx for septic shock. Patient was taken to the OR on 10/19 for I and D of L BKA with wound vac placement performed by Dr. Roe. Patient returned to the OR on 10/22 for L BKA revision performed by Dr. Colindres. Blood cultures returned + Mssa. Updated Echo done showed EF 45%, no evidence of vegetations. ID  recommending IV Ancef 2g q8hr x 6 weeks, stop date 12/1/23.     Patient was admitted to [facility] on 10/27/2023.     Hospital Course by Problem List:  BKA abscess - Patient s/p I&D and Revision for abscess and possible osteomyelitis. Required revision for osteomyelitis    -PT and OT for mobility and ADLs. Per guidelines, 15 hours per week between PT, OT and/or SLP.  -Follow-up Orthopedics. NWB LLE, touch down weight bearing RLE for charcot joint     MSSA bacteremia - Patient on Ancef 2g q8 until 11/19/23 per ID. Continue Ancef      Anemia - Check AM CBC - 9.8, will recheck 10/31/23 w Fe panel. Repeat 9.0, low Fe and Fe sat. Start on PO Fe.      A Fib - Patient has history of A fib.      Thrombocytopenia  - Check AM CBC - 170, monitor. Will repeat       Depression - Patient on Cymbalta      Neuropathy - Patient on Cymbalta 60 mg and Lyrica 150 mg. Continue Cymbalta and Lyrica. Cr stable on 11/3 labs     Pain - Patient on PRN Tylenol, Oxycodone, Lyrica 150 mg TID, and Morphine 30 mg q6. Ongoing pain, continue Morphine 30 mg q6h     Skin - Patient at risk for skin breakdown due to debility in areas including sacrum, achilles, elbows and head in addition to other sites. Nursing to assess skin daily.   -Psoriasis rashes - start Medrol dose yahaira. Continue steroids as improving arm rashes  -Groin rash not improving with nystatin. Will start Fluconazole.      Vitamin D deficiency -16 on admission, start 2000 U      Insomnia - On Ambien 10 mg daily     GI Ppx - Patient on Prilosec for GERD prophylaxis. Patient on Senna-docusate for constipation prophylaxis.      DVT Ppx - Patient Lovenox on transfer. Continue Lovenox    Functional Status at Discharge  Eating:  Independent  Eating Description:  Set-up of equipment or meal/tube feeding, Increased time  Grooming:  Modified Independent, Seated  Grooming Description:  Increased time, Seated in wheelchair at sink, Verbal cueing  Bathing:  Supervision  Bathing  Description:  Set up for wound protection, Set-up of equipment, Supervision for safety, Grab bar, Hand held shower, Tub bench  Upper Body Dressing:  Modified Independent  Upper Body Dressing Description:  Assist device equipment (wc)  Lower Body Dressing:  Modified Independent  Lower Body Dressing Description:  Assistive devices (wc; gets dressed on bed; doffed clothes on shower bench)  Discharge Location : Home  Patient Discharging with Assist of: Family ;Spouse / Significant Other  Level of Supervision Required: Intermittent Supervision  Recommended Equipment for Discharge: Slideboard  Recommended Services Upon Discharge: Home Health Occupational Therapy  Long Term Goals Met: 0  Long Term Goals Not Met: 2  Reason(s) for Goals Not Met: supervision for safety due to BKA and TTWB  Criteria for Termination of Services: Maximum Function Achieved for Inpatient Rehabilitation  Walk:  Unable to Participate  Distance Walked:     Number of Times Distance Was Traveled:     Assistive Device:     Gait Deviation:     Wheelchair:  Modified Independent  Distance Propelled:  150+   Wheelchair Description:  Extra time, Limited by fatigue  Stairs Unable to Participate  Stairs Description Safety concerns (B LE NWB/BKA)  Discharge Location: Home  Patient Discharging with Assist of: Spouse / Significant Other  Level of Supervision Required Upon Discharge: No Supervision  Recommended Equipment for Discharge: Manual Wheelchair  Recommeded Services Upon Discharge: Home Health Physical Therapy  Long Term Goals Met: 2  Long Term Goals Not Met: 1  Reason(s) for Goals Not Met: unable to trial car transfer, simulated with raised mat (pt reports he will likely perform SPT with FWW for safer transfer, despite education on RLE NWB)  Criteria for Termination of Services: Maximum Function Achieved for Inpatient Rehabilitation  Comprehension:     Comprehension Description:     Expression:     Expression Description:     Social Interaction:      Social Interaction Description:     Problem Solving:     Problem Solving Description:     Memory:     Memory Description:          IShaista D.O., personally performed a complete drug regimen review and no potential clinically significant medication issues were identified.   Discharge Medication:     Medication List        START taking these medications        Instructions   fluconazole 100 MG Tabs  Commonly known as: Diflucan   Take 1 Tablet by mouth every day.  Dose: 100 mg     methylPREDNISolone 4 MG Tabs  Commonly known as: Medrol   Doctor's comments: For 3 days  Take 1 Tablet by mouth 3 times a day.  Dose: 4 mg     oxyCODONE immediate release 10 MG immediate release tablet  Commonly known as: Roxicodone   Take 1 Tablet by mouth every 6 hours as needed for Severe Pain for up to 14 days.  Dose: 10 mg            CONTINUE taking these medications        Instructions   Acetaminophen Extra Strength 500 MG Tabs   Take 2 Tablets by mouth in the morning, at noon, and at bedtime.  Dose: 1,000 mg     albuterol 108 (90 Base) MCG/ACT Aers inhalation aerosol   Inhale 2 Puffs every 6 hours as needed for Shortness of Breath.  Dose: 2 Puff     D3-1000 1000 UNIT Tabs  Generic drug: vitamin D   Take 1 Tablet by mouth every day.  Dose: 1,000 Units     dapagliflozin propanediol 10 MG Tabs  Commonly known as: Farxiga   Take 1 Tablet by mouth every day for 90 days.  Dose: 10 mg     DULoxetine 60 MG Cpep delayed-release capsule  Commonly known as: Cymbalta   Take 1 Capsule by mouth every day.  Dose: 60 mg     furosemide 20 MG Tabs  Commonly known as: Lasix   Take 1 Tablet by mouth 2 times a day as needed (water retention). Take 1 tablet daily and may take additional 1 tablet as need for increase in weight by 3 lb/ water retention.  Dose: 20 mg     Humira Pen 40 MG/0.4ML Pnkt  Generic drug: Adalimumab   INJECT 40MG SUBCUTANEOUSLY  EVERY 2 WEEKS     * morphine 30 MG tablet  Commonly known as: MS IR   Take 30 mg by mouth 4  times a day.  Dose: 30 mg     * morphine ER 15 MG Tbcr tablet  Commonly known as: Ms Contin   Take 1 Tablet by mouth 2 times a day for 14 days.  Dose: 15 mg     Movantik 25 MG Tabs  Generic drug: Naloxegol Oxalate   Take 25 mg by mouth every day.  Dose: 25 mg     multivitamin Tabs   Take 1 Tablet by mouth every day.  Dose: 1 Tablet     Naloxone 4 MG/0.1ML Liqd  Commonly known as: Narcan   One spray in one nostril for overdose and call 911.     NS SOLN 100 mL with ceFAZolin 2 g SOLR 2 g   Infuse 2 g into a venous catheter every 8 hours for 35 days.  Dose: 2 g     omeprazole 20 MG delayed-release capsule  Commonly known as: PriLOSEC   Take 1 Capsule by mouth every day.  Dose: 20 mg     pregabalin 150 MG Caps  Commonly known as: Lyrica   Take 1 Capsule by mouth in the morning, at noon, and at bedtime for 30 days.  Dose: 150 mg     zolpidem 5 MG Tabs  Commonly known as: Ambien   Take 1 Tablet by mouth at bedtime as needed for Sleep for up to 30 days.  Dose: 5 mg           * This list has 2 medication(s) that are the same as other medications prescribed for you. Read the directions carefully, and ask your doctor or other care provider to review them with you.                STOP taking these medications      lidocaine 5 % Ptch  Commonly known as: Lidoderm              Discharge Diet:  Current Diet Order   Procedures    Diet Order Diet: Regular       Discharge Activity:  NWB BLE     Disposition:  Patient to discharge home with family support and community resources.    Equipment:  WC    Follow-up & Discharge Instructions:  Follow up with your primary care provider (PCP) within 7-10 days of discharge to review your medications and take over your care.     If you develop chest pain, fever, chills, change in neurologic function (weakness, sensation changes, vision changes), or other concerning sxs, seek immediate medical attention or call 911.      Future Appointments   Date Time Provider Department Center   11/3/2023  1:30  PM Binu Keen, OT/L [facility] None   11/30/2023 12:00 PM [facility]       Condition on Discharge:  Good    More than 33 minutes was spent on discharging this patient, including face-to-face time, prescription management, and the dictation of this note.    Shaista Guerra D.O.    Date of Service: 11/4/2023

## 2023-11-03 NOTE — DISCHARGE PLANNING
RUTH Dumont has a co pay of $50 for slideboard; Doris w/ [employer] called pt and informed him; he prefers to purchase one on [employer].

## 2023-11-03 NOTE — THERAPY
Physical Therapy   Discharge Summary     Patient Name: Richard Hubbard II  Age:  48 y.o., Sex:  male  Medical Record #: [id number]  Today's Date: 11/3/2023     The following represents two non consecutive sessions totaling 90 min 7:00-8:00 and 11:00-11:30.    Precautions  Precautions: Fall Risk, Non Weight Bearing Left Lower Extremity, Toe Touch Weight Bearing Right Lower Extremity  Comments: Touchdown WB R LE; 1L O2 PRN    Subjective    Pt reports feeling ready to go home     Objective       11/03/23 0701   PT Charge Group   PT Therapeutic Exercise (Units) 2   PT Therapeutic Activities (Units) 2   PT Total Time Spent   PT Individual Total Time Spent (Mins) 60   Gait Functional Level of Assist    Gait Level Of Assist Unable to Participate   Wheelchair Functional Level of Assist   Wheelchair Assist Modified Independent   Distance Wheelchair (Feet or Distance) 150+   Wheelchair Description Extra time;Limited by fatigue   Stairs Functional Level of Assist   Level of Assist with Stairs Unable to Participate   Transfer Functional Level of Assist   Bed, Chair, Wheelchair Transfer Independent  (additional slide board transfer to raised mat with Adore and increased time, cues for safety)   Bed Chair Wheelchair Transfer Description Assist with two limbs  (depression lift transfer. Inconsistent adherence to RLE NWB throughout, worsened with uneven transfer.)   Supine Lower Body Exercise   Bridges Two Legged;2 sets of 15  (BLE knees on bolster to maintain BLE NWB)   Hip Abduction 2 sets of 15;Bilateral   Short Arc Quad 2 sets of 15;Bilateral  (5s holds)   Ankle Pumps 2 sets of 15;Right   Bed Mobility    Supine to Sit Independent   Sit to Supine Independent   Sit to Stand Unable to Participate   Scooting Independent   Rolling Independent   Interdisciplinary Plan of Care Collaboration   Patient Position at End of Therapy Seated;Call Light within Reach;Tray Table within Reach   PT DME Recommendations   Wheelchair   (pt already  "owns)   Assistive Device   (pt already owns FWW)   Additional Equipment Transfer Board (30\" Length)   Roll Left and Right   Assistance Needed Independent   CARE Score - Roll Left and Right 6   Sit to Lying   Assistance Needed Independent   CARE Score - Sit to Lying 6   Lying to Sitting on Side of Bed   Assistance Needed Independent   CARE Score - Lying to Sitting on Side of Bed 6   Sit to Stand   Reason if not Attempted Medical concerns   CARE Score - Sit to Stand 88   Chair/Bed-to-Chair Transfer   Assistance Needed Independent   CARE Score - Chair/Bed-to-Chair Transfer 6   Car Transfer   Assistance Needed Physical assistance   Physical Assistance Level 26%-50%   CARE Score - Car Transfer 3   Walk 10 Feet   Reason if not Attempted Medical concerns   CARE Score - Walk 10 Feet 88   Walk 50 Feet with Two Turns   Reason if not Attempted Medical concerns   CARE Score - Walk 50 Feet with Two Turns 88   Walk 150 Feet   Reason if not Attempted Medical concerns   CARE Score - Walk 150 Feet 88   Walking 10 Feet on Uneven Surfaces   Reason if not Attempted Medical concerns   CARE Score - Walking 10 Feet on Uneven Surfaces 88   1 Step (Curb)   Reason if not Attempted Medical concerns   CARE Score - 1 Step (Curb) 88   4 Steps   Reason if not Attempted Medical concerns   CARE Score - 4 Steps 88   12 Steps   Reason if not Attempted Medical concerns   CARE Score - 12 Steps 88   Picking Up Object   Assistance Needed Physical assistance   Physical Assistance Level Total assistance   CARE Score - Picking Up Object 1   Wheel 50 Feet with Two Turns   Assistance Needed Independent   CARE Score - Wheel 50 Feet with Two Turns 6   Type of Wheelchair/Scooter Manual   Wheel 150 Feet   Assistance Needed Independent   CARE Score - Wheel 150 Feet 6   Type of Wheelchair/Scooter Manual   P.T. Discharge Summary   Discharge Location Home   Patient Discharging with Assist of Spouse / Significant Other   Level of Supervision Required Upon Discharge No " Supervision   Recommended Equipment for Discharge Manual Wheelchair   Recommeded Services Upon Discharge Home Health Physical Therapy   Long Term Goals Met 2   Long Term Goals Not Met 1   Reason(s) for Goals Not Met unable to trial car transfer, simulated with raised mat  (pt reports he will likely perform SPT with FWW for safer transfer, despite education on RLE NWB)   Criteria for Termination of Services Maximum Function Achieved for Inpatient Rehabilitation   Discharge Instructions to Patient   Weight Bearing Status - Patient Should Bear No Weight Right Leg;Bear No Weight Left Leg   Level of Assist Required for Ambulation Should Not Attempt Walking;Should Not Attempt Stairs at This Time;Should Not Attempt Curbs at This Time   Level of Assist Required to Propel Wheelchair Requires No Assist   Level of Assist Required for Transfers Requires No Assist   Home Exercise Program Refer to Home Exercise Program Handout for Details   Prosthesis / Orthosis Recommendation / Location No Prosthesis  or Orthosis Recommended  (until LLE wound healed)        11/03/23 1101   PT Charge Group   PT Therapeutic Activities (Units) 2   PT Total Time Spent   PT Individual Total Time Spent (Mins) 30   Vitals   Pulse (!) 177  (140-180bpm in afib)   Pulse Oximetry 95 %   O2 Delivery Device None - Room Air   Vitals Comments seated/at rest, pt reports feeling fatigued with shortness of breath. MD made aware, as well as RN   Transfer Functional Level of Assist   Bed, Chair, Wheelchair Transfer Independent   Bed Chair Wheelchair Transfer Description Assist with two limbs  (depression lift transfer)   Interdisciplinary Plan of Care Collaboration   IDT Collaboration with  Physician;Respiratory Therapist;Nursing;Occupational Therapist   Patient Position at End of Therapy Seated;Call Light within Reach;Tray Table within Reach   Collaboration Comments tachycardia     Pt dependently wheeled outside d/t tachycardia. Education provided on d/c planning,  safety at home, f/u with MD's, car transfer, w/c use.     Assessment    Pt limited by poor activity tolerance overall and tachycardia during second session. Pt reports feeling ready for d/c home.     Strengths: Independent prior level of function, Motivated for self care and independence, Supportive family, Willingly participates in therapeutic activities, Alert and oriented  Barriers: Bladder retention, Decreased endurance, Impaired activity tolerance, Impaired balance, Impulsive, Limited mobility, Pain    Plan    D/c home tomorrow 11/4        Physical Therapy Problems (Active)       There are no active problems.

## 2023-11-03 NOTE — DISCHARGE PLANNING
CM  [facility] (choice - pt has been on service w/ them in the pat) has accepted pt; [facility] will bring IVAB to hospital in preparation for dc on 11/4; pt has all dme at home; follow up with:     I met with pt who is in agreement w dc home tomorrow.   I reviewed all cm instructions.     Follow-up Information:   Johnathan Dover M.D. /PCP  [address] [facility]  [place] [id number] [phone]      Calvin Pryor M.D. Ortho  [address] [facility]  [place] [id number] [phone]        [facility] [place]  [address]  [place] [address]  [phone]  [facility] will call to schedule follow up with RN/PT/OT.    [facility]  [address]  [place] [address]  [phone]  Provider for home infusion. IV meds delivered to pt @ [facility].            Spouse to transport home.  Dc 11/4/2023  w/ home health / iv infusion @ home

## 2023-11-03 NOTE — CARE PLAN
Problem: Mobility  Goal: STG-Within one week, patient will propel wheelchair community 200ft including 3% grade Adore  Outcome: Met     Problem: Mobility Transfers  Goal: STG-Within one week, patient will transfer bed to chair with SBA while maintaining Wbing precautions  Outcome: Met     Problem: PT-Long Term Goals  Goal: LTG-By discharge, patient will propel wheelchair 300ft indoors and level outdoors Alfonzo  Outcome: Met  Goal: LTG-By discharge, patient will transfer one surface to another with set up assist while maintaining Wbing precautions  Outcome: Met  Goal: LTG-By discharge, patient will transfer in/out of a car with modA  Outcome: Discharged - Not Met

## 2023-11-03 NOTE — CARE PLAN
Problem: OT Long Term Goals  Goal: LTG-By discharge, patient will complete basic self care tasks w/ mod I and AE/DME as needed  Outcome: Not Met  Goal: LTG-By discharge, patient will complete basic home management w/ mod I and LRAD as needed  Outcome: Not Met

## 2023-11-03 NOTE — PROGRESS NOTES
NURSING DAILY NOTE    Name: Richard Hubbard II   Date of Admission: 10/27/2023   Admitting Diagnosis: BKA stump complication (HCC)  Attending Physician: Keaton Pisano M.d.  Allergies: Nsaids, Diphenhydramine, Mirtazapine, Penicillins, and Promethazine    Safety  Patient Assist  sba  Patient Precautions  Fall Risk, Non Weight Bearing Left Lower Extremity, Toe Touch Weight Bearing Right Lower Extremity  Precaution Comments  Touchdown WB R LE; 1L O2 PRN  Bed Transfer Status  Standby Assist  Toilet Transfer Status   Minimal Assist  Assistive Devices  Slide board, Rails, Wheelchair  Oxygen  None - Room Air  Diet/Therapeutic Dining  Current Diet Order   Procedures    Diet Order Diet: Regular     Pill Administration  whole  Agitated Behavioral Scale     ABS Level of Severity       Fall Risk  Has the patient had a fall this admission?   No  Osiris Healy Fall Risk Scoring  12, MODERATE RISK  Fall Risk Safety Measures  bed alarm, chair alarm, and poor balance    Vitals  Temperature: 36.8 °C (98.3 °F)  Temp src: Oral  Pulse: 81  Respiration: 18  Blood Pressure: 92/57  Blood Pressure MAP (Calculated): 69 MM HG  BP Location: Right, Upper Arm  Patient BP Position: Sitting     Oxygen  Pulse Oximetry: 98 %  O2 (LPM): 0  O2 Delivery Device: None - Room Air    Bowel and Bladder  Last Bowel Movement  11/01/23  Stool Type  Type 3: Like a sausage, but with cracks on its surface  Bowel Device  Bathroom  Continent  Bladder:  (piedra in place)   Bowel: No movement  Bladder Function  Urine Void (mL): 900 ml (urinals)  Number of Times Voided: 2  Urine Color: Yellow  Genitourinary Assessment   Bladder Assessment (WDL):  WDL Except  Piedra Catheter: Not Applicable  Urine Color: Yellow  Bladder Device: Urinal  Bladder Scan: Post Void  $ Bladder Scan Results (mL): 117    Skin  Nam Score   16  Sensory Interventions   Bed Types: Low Airloss  Skin Preventative Measures:  Pillows in Use for Support / Positioning  Moisture Interventions  Moisturizers/Barriers: Antifungal Paste, Barrier Wipes      Pain  Pain Rating Scale  7 - Focus of attention, prevents doing daily activities  Pain Location  Leg  Pain Location Orientation  Left  Pain Interventions   Medication (see MAR)    ADLs    Bathing   Shower, * * With Assistance from, Staff  Linen Change   Partial  Personal Hygiene  Moist Charlotte Wipes, Perineal Care  Chlorhexidine Bath      Oral Care     Teeth/Dentures     Shave     Nutrition Percentage Eaten  Lunch, Between % Consumed  Environmental Precautions  Bed in Low Position, Treaded Slipper Socks on Patient  Patient Turns/Positioning  Patient Turns Self from Side to Side  Patient Turns Assistance/Tolerance     Bed Positions  Bed Controls On  Head of Bed Elevated  Self regulated      Psychosocial/Neurologic Assessment  Psychosocial Assessment  Psychosocial (WDL):  Within Defined Limits  Patient Behaviors: Anxious  Neurologic Assessment  Neuro (WDL): Exceptions to WDL (neuropathy)  Level of Consciousness: Alert  Orientation Level: Oriented X4  Cognition: Appropriate judgement  Speech: Clear  Pupil Assesment: No  EENT (WDL):  WDL Except    Cardio/Pulmonary Assessment  Edema   Scrotal Edema: Generalized  Respiratory Breath Sounds  RUL Breath Sounds: Clear  RML Breath Sounds: Clear  RLL Breath Sounds: Diminished  DONALD Breath Sounds: Clear  LLL Breath Sounds: Diminished  Cardiac Assessment   Cardiac (WDL):  WDL Except (hx chf)

## 2023-11-03 NOTE — THERAPY
Occupational Therapy  Daily Treatment     Patient Name: Richard Hubbard II  Age:  48 y.o., Sex:  male  Medical Record #: [id number]  Today's Date: 11/3/2023     Precautions  Precautions: (P) Fall Risk, Non Weight Bearing Left Lower Extremity, Toe Touch Weight Bearing Right Lower Extremity  Comments: Touchdown WB R LE; 1L O2 PRN         Subjective    Patient was sleeping soundly in bed upon OT arrival.  Once awake, he was agreeable to OT.  He reported fatigue.       Objective       11/03/23 1331   OT Charge Group   OT Therapy Activity (Units) 1   OT Therapeutic Exercise (Units) 1   OT Total Time Spent   OT Individual Total Time Spent (Mins) 30   Precautions   Precautions Fall Risk;Non Weight Bearing Left Lower Extremity;Toe Touch Weight Bearing Right Lower Extremity   Vitals   Pulse (!) 140   Functional Level of Assist   Bed, Chair, Wheelchair Transfer Supervised   Bed Chair Wheelchair Transfer Description Set-up of equipment;Supervision for safety  (lateral scoot bed to w/c)   Sitting Upper Body Exercises   Sitting Upper Body Exercises Yes   Bicep Curls 3 sets of 10;Right ;Left;Weight (See Comments for lbs)   Pronation / Supination 3 sets of 10;Right ;Left;Weight (See Comments for lbs)   Comments 3 lb weights   Bed Mobility    Supine to Sit Independent   Scooting Independent   Interdisciplinary Plan of Care Collaboration   Patient Position at End of Therapy Seated;Call Light within Reach;Tray Table within Reach;Phone within Reach;Self Releasing Lap Belt Applied         Assessment    Patient tolerated light UE therex well and without complaints.  He is ready to d/c tomorrow if medically stable.   Strengths: Able to follow instructions, Adequate strength, Independent prior level of function, Motivated for self care and independence, Pleasant and cooperative, Willingly participates in therapeutic activities, Supportive family  Barriers: Decreased endurance, Fatigue, Generalized weakness, Impaired activity tolerance,  "Impaired balance, Limited mobility, Pain    Plan    D/C tomorrow    DME  OT DME Recommendations  Additional Equipment: Transfer Board (30\" Length)        Occupational Therapy Goals (Active)       Problem: IADL's       Dates: Start:  10/28/23         Goal: STG-Within one week, patient will prepare a meal at w/c level w/ SBA       Dates: Start:  10/28/23    Expected End:  11/17/23         Goal Note filed on 10/31/23 1151 by Binu Keen, OT/L       Not yet attempted              Goal: STG-Within one week, patient will utilize washer and dryer at w/c level w/ SBA and AE as needed       Dates: Start:  10/28/23    Expected End:  11/17/23         Goal Note filed on 10/31/23 1151 by Binu Keen, OT/L       Not yet attempted                 Problem: OT Long Term Goals       Dates: Start:  10/28/23         Goal: LTG-By discharge, patient will complete basic self care tasks w/ mod I and AE/DME as needed       Dates: Start:  10/28/23    Expected End:  11/17/23            Goal: LTG-By discharge, patient will complete basic home management w/ mod I and LRAD as needed       Dates: Start:  10/28/23    Expected End:  11/17/23               Problem: Toileting       Dates: Start:  10/28/23         Goal: STG-Within one week, patient will complete toileting tasks w/ SBA and DME as needed       Dates: Start:  10/28/23    Expected End:  11/17/23         Goal Note filed on 10/31/23 1151 by Binu Keen, OT/L       CGA                "

## 2023-11-04 NOTE — PROGRESS NOTES
NURSING DAILY NOTE    Name: Richard Hubbard II   Date of Admission: 10/27/2023   Admitting Diagnosis: BKA stump complication (HCC)  Attending Physician: Keaton Pisano M.d.  Allergies: Nsaids, Diphenhydramine, Mirtazapine, Penicillins, and Promethazine    Safety  Patient Assist  sba  Patient Precautions  Fall Risk, Non Weight Bearing Left Lower Extremity, Toe Touch Weight Bearing Right Lower Extremity  Precaution Comments  Touchdown WB R LE; 1L O2 PRN  Bed Transfer Status  Supervised  Toilet Transfer Status   Supervised (BSC)  Assistive Devices  Slide board, Rails, Wheelchair  Oxygen  Room air w/o2 available  Diet/Therapeutic Dining  Current Diet Order   Procedures    Diet Order Diet: Regular     Pill Administration  whole  Agitated Behavioral Scale     ABS Level of Severity       Fall Risk  Has the patient had a fall this admission?   No  Osiris Healy Fall Risk Scoring  13, MODERATE RISK  Fall Risk Safety Measures  bed alarm, chair alarm, and poor balance    Vitals  Temperature: 36.5 °C (97.7 °F)  Temp src: Oral  Pulse: 92  Respiration: 18  Blood Pressure: 133/75  Blood Pressure MAP (Calculated): 94 MM HG  BP Location: Left, Upper Arm  Patient BP Position: Sitting     Oxygen  Pulse Oximetry: 91 %  O2 (LPM): 0  O2 Delivery Device: Room air w/o2 available    Bowel and Bladder  Last Bowel Movement  11/03/23  Stool Type  Type 3: Like a sausage, but with cracks on its surface  Bowel Device  Bathroom  Continent  Bladder: Continent void   Bowel: Continent movement  Bladder Function  Urine Void (mL): 600 ml  Number of Times Voided: 1  Urine Color: Yellow  Genitourinary Assessment   Bladder Assessment (WDL):  WDL Except  Irvera Catheter: Not Applicable  Urine Color: Yellow  Bladder Device: Urinal  Bladder Scan: Post Void  $ Bladder Scan Results (mL): 117    Skin  Nam Score   17  Sensory Interventions   Bed Types: Low Airloss  Skin Preventative  Measures: Pillows in Use for Support / Positioning  Moisture Interventions  Moisturizers/Barriers: Antifungal Paste      Pain  Pain Rating Scale  7 - Focus of attention, prevents doing daily activities  Pain Location  Leg, Foot  Pain Location Orientation  Left  Pain Interventions   Medication (see MAR)    ADLs    Bathing   Shower, * * With Assistance from, Staff  Linen Change   Partial  Personal Hygiene  Moist Charlotte Wipes, Perineal Care  Chlorhexidine Bath      Oral Care     Teeth/Dentures     Shave     Nutrition Percentage Eaten  Dinner, Between % Consumed  Environmental Precautions  Bed in Low Position, Treaded Slipper Socks on Patient  Patient Turns/Positioning  Patient Turns Self from Side to Side  Patient Turns Assistance/Tolerance     Bed Positions  Bed Controls On  Head of Bed Elevated  Self regulated      Psychosocial/Neurologic Assessment  Psychosocial Assessment  Psychosocial (WDL):  Within Defined Limits  Patient Behaviors: Anxious  Neurologic Assessment  Neuro (WDL): Exceptions to WDL (neuropathy)  Level of Consciousness: Alert  Orientation Level: Oriented X4  Cognition: Appropriate judgement  Speech: Clear  Pupil Assesment: No  EENT (WDL):  WDL Except    Cardio/Pulmonary Assessment  Edema   Scrotal Edema: Generalized  Respiratory Breath Sounds  RUL Breath Sounds: Diminished  RML Breath Sounds: Diminished  RLL Breath Sounds: Diminished  DONALD Breath Sounds: Diminished  LLL Breath Sounds: Diminished  Cardiac Assessment   Cardiac (WDL):  WDL Except (hx chf)

## 2023-11-04 NOTE — PROGRESS NOTES
Patient discharged to home per order.  Discharge instructions reviewed with patient and space; they verbalize understanding and signed copies placed in chart.  Patient has all belongings; signed copy of form in chart.  Patient left facility at 1340 via accompanied by rehab staff and spouse.  Have enjoyed working with this pleasant patient.

## 2023-11-04 NOTE — CARE PLAN
Problem: Knowledge Deficit - Standard  Goal: Patient and family/care givers will demonstrate understanding of plan of care, disease process/condition, diagnostic tests and medications  Outcome: Progressing   Pt education given regarding plan of care with emphasis on adequate hydration, pt shows good understanding with moderate follow through, will continue to reinforce education and continue to monitor.         Problem: Fall Risk - Rehab  Goal: Patient will remain free from falls  Outcome: Progressing   Pt education given regarding fall precautions AND safety measures, pt shows good understanding, has not attempted to self transfer this shift, will continue to reinforce education and continue to monitor.

## 2023-11-04 NOTE — PROGRESS NOTES
NURSING DAILY NOTE    Name: Richard Hubbard II   Date of Admission: 10/27/2023   Admitting Diagnosis: BKA stump complication (HCC)  Attending Physician: Keaton Pisano M.d.  Allergies: Nsaids, Diphenhydramine, Mirtazapine, Penicillins, and Promethazine    Safety  Patient Assist  sba  Patient Precautions  Fall Risk, Non Weight Bearing Left Lower Extremity, Toe Touch Weight Bearing Right Lower Extremity  Precaution Comments  Touchdown WB R LE; 1L O2 PRN  Bed Transfer Status  Supervised  Toilet Transfer Status   Supervised (BSC)  Assistive Devices  Slide board, Rails, Wheelchair  Oxygen  None - Room Air  Diet/Therapeutic Dining  Current Diet Order   Procedures    Diet Order Diet: Regular     Pill Administration  whole  Agitated Behavioral Scale     ABS Level of Severity       Fall Risk  Has the patient had a fall this admission?   No  Osiris Healy Fall Risk Scoring  12, MODERATE RISK  Fall Risk Safety Measures  bed alarm, chair alarm, and poor balance    Vitals  Temperature: 36.8 °C (98.2 °F)  Temp src: Temporal  Pulse: (!) 149 (RN notified )  Respiration: 18  Blood Pressure: 101/58  Blood Pressure MAP (Calculated): 72 MM HG  BP Location: Right, Upper Arm  Patient BP Position: Sitting     Oxygen  Pulse Oximetry: 96 %  O2 (LPM): 0  O2 Delivery Device: None - Room Air    Bowel and Bladder  Last Bowel Movement  11/01/23  Stool Type  Type 3: Like a sausage, but with cracks on its surface  Bowel Device  Bathroom  Continent  Bladder:  (piedra in place)   Bowel: No movement  Bladder Function  Urine Void (mL): 400 ml  Number of Times Voided: 1  Urine Color: Yellow  Genitourinary Assessment   Bladder Assessment (WDL):  WDL Except  Piedra Catheter: Not Applicable  Urine Color: Yellow  Bladder Device: Urinal  Bladder Scan: Post Void  $ Bladder Scan Results (mL): 117    Skin  Nam Score   16  Sensory Interventions   Bed Types: Low Airloss  Skin Preventative  Measures: Pillows in Use for Support / Positioning  Moisture Interventions  Moisturizers/Barriers: Antifungal Paste      Pain  Pain Rating Scale  7 - Focus of attention, prevents doing daily activities  Pain Location  Leg  Pain Location Orientation  Left  Pain Interventions   Medication (see MAR)    ADLs    Bathing   Shower, * * With Assistance from, Staff  Linen Change   Partial  Personal Hygiene  Moist Charlotte Wipes, Perineal Care  Chlorhexidine Bath      Oral Care     Teeth/Dentures     Shave     Nutrition Percentage Eaten  Breakfast, Less than 25% Consumed  Environmental Precautions  Bed in Low Position, Treaded Slipper Socks on Patient  Patient Turns/Positioning  Patient Turns Self from Side to Side  Patient Turns Assistance/Tolerance     Bed Positions  Bed Controls On  Head of Bed Elevated  Self regulated      Psychosocial/Neurologic Assessment  Psychosocial Assessment  Psychosocial (WDL):  Within Defined Limits  Patient Behaviors: Anxious  Neurologic Assessment  Neuro (WDL): Exceptions to WDL (neuropathy)  Level of Consciousness: Alert  Orientation Level: Oriented X4  Cognition: Appropriate judgement  Speech: Clear  Pupil Assesment: No  EENT (WDL):  WDL Except    Cardio/Pulmonary Assessment  Edema   Scrotal Edema: Generalized  Respiratory Breath Sounds  RUL Breath Sounds: Clear  RML Breath Sounds: Clear  RLL Breath Sounds: Diminished  DONALD Breath Sounds: Clear  LLL Breath Sounds: Diminished  Cardiac Assessment   Cardiac (WDL):  WDL Except (hx chf)

## 2023-11-04 NOTE — FLOWSHEET NOTE
11/04/23 0809   Events/Summary/Plan   Events/Summary/Plan Albuterol mdi given on RNs request   Vital Signs   Pulse 92   Respiration 18   Pulse Oximetry 91 %   $ Pulse Oximetry (Spot Check) Yes   Respiratory Assessment   Level of Consciousness Alert   Chest Exam   Work Of Breathing / Effort Mild   Breath Sounds   RUL Breath Sounds Diminished   RML Breath Sounds Diminished   RLL Breath Sounds Diminished   DONALD Breath Sounds Diminished   Secretions   Cough Non Productive;Dry   Oxygen   O2 Delivery Device Room air w/o2 available

## 2023-11-04 NOTE — PROGRESS NOTES
NURSING DAILY NOTE    Name: Richard Hubbard II   Date of Admission: 10/27/2023   Admitting Diagnosis: BKA stump complication (HCC)  Attending Physician: Keaton Pisano M.d.  Allergies: Nsaids, Diphenhydramine, Mirtazapine, Penicillins, and Promethazine    Safety  Patient Assist  SBA  Patient Precautions  Fall Risk, Non Weight Bearing Left Lower Extremity, Toe Touch Weight Bearing Right Lower Extremity  Precaution Comments  Touchdown WB R LE; 1L O2 PRN  Bed Transfer Status  Supervised  Toilet Transfer Status   Supervised (BSC)  Assistive Devices  Slide board, Rails, Wheelchair  Oxygen  None - Room Air  Diet/Therapeutic Dining  Current Diet Order   Procedures    Diet Order Diet: Regular     Pill Administration  whole  Agitated Behavioral Scale     ABS Level of Severity       Fall Risk  Has the patient had a fall this admission?   No  Osiris Healy Fall Risk Scoring  12, MODERATE RISK  Fall Risk Safety Measures  bed alarm, chair alarm, and poor balance    Vitals  Temperature: 36.5 °C (97.7 °F)  Temp src: Oral  Pulse: (Abnormal) 116  Respiration: 20  Blood Pressure: 102/64  Blood Pressure MAP (Calculated): 77 MM HG  BP Location: Left, Upper Arm  Patient BP Position: Sitting     Oxygen  Pulse Oximetry: 96 %  O2 (LPM): 0  O2 Delivery Device: None - Room Air    Bowel and Bladder  Last Bowel Movement  11/03/23 (per pt)  Stool Type  Type 3: Like a sausage, but with cracks on its surface  Bowel Device  Bathroom  Continent  Bladder: Continent void   Bowel: Continent movement  Bladder Function  Urine Void (mL): 600 ml  Number of Times Voided: 1  Urine Color: Yellow  Genitourinary Assessment   Bladder Assessment (WDL):  WDL Except  Rivera Catheter: Not Applicable  Urine Color: Yellow  Bladder Device: Urinal  Bladder Scan: Post Void  $ Bladder Scan Results (mL): 117    Skin  Nam Score   17  Sensory Interventions   Bed Types: Low Airloss  Skin Preventative  Measures: Pillows in Use for Support / Positioning  Moisture Interventions  Moisturizers/Barriers: Antifungal Paste      Pain  Pain Rating Scale  7 - Focus of attention, prevents doing daily activities  Pain Location  Leg  Pain Location Orientation  Left  Pain Interventions   Medication (see MAR)    ADLs    Bathing   Shower, * * With Assistance from, Staff  Linen Change   Partial  Personal Hygiene  Moist Charlotte Wipes, Perineal Care  Chlorhexidine Bath      Oral Care     Teeth/Dentures     Shave     Nutrition Percentage Eaten  Dinner, Between % Consumed  Environmental Precautions  Bed in Low Position, Treaded Slipper Socks on Patient  Patient Turns/Positioning  Patient Turns Self from Side to Side  Patient Turns Assistance/Tolerance     Bed Positions  Bed Controls On  Head of Bed Elevated  Self regulated      Psychosocial/Neurologic Assessment  Psychosocial Assessment  Psychosocial (WDL):  Within Defined Limits  Patient Behaviors: Anxious  Neurologic Assessment  Neuro (WDL): Exceptions to WDL (neuropathy)  Level of Consciousness: Alert  Orientation Level: Oriented X4  Cognition: Appropriate judgement  Speech: Clear  Pupil Assesment: No  EENT (WDL):  WDL Except    Cardio/Pulmonary Assessment  Edema   Scrotal Edema: Generalized  Respiratory Breath Sounds  RUL Breath Sounds: Clear  RML Breath Sounds: Clear  RLL Breath Sounds: Diminished  DONALD Breath Sounds: Clear  LLL Breath Sounds: Diminished  Cardiac Assessment   Cardiac (WDL):  WDL Except (hx chf)

## 2023-11-04 NOTE — FLOWSHEET NOTE
11/04/23 0736   Events/Summary/Plan   Events/Summary/Plan RA pulse ox check   Vital Signs   Pulse 87   Respiration 18   Pulse Oximetry 90 %   $ Pulse Oximetry (Spot Check) Yes   Respiratory Assessment   Level of Consciousness Alert   Chest Exam   Work Of Breathing / Effort Mild   Oxygen   O2 Delivery Device Room air w/o2 available

## 2023-11-28 NOTE — PROGRESS NOTES
INFECTIOUS  DISEASE  OUTPATIENT CLINIC  NOTE   Subjective   Primary care provider: Johnathan Dover M.D..     Reason for Follow Up:   Follow-up for   1. MSSA bacteremia        2. Polymicrobial bacterial infection        3. Hx of BKA, left (HCC)        4. Osteomyelitis of left leg (HCC)        5. Rheumatoid arthritis, involving unspecified site, unspecified whether rheumatoid factor present (HCC)        6. History of immunosuppressive therapy            HPI: Patient previously seen and treated by ID team as inpatient during hospital admission.   Richard Hubbard II is a 48 y.o.  admitted 10/18/2023. Pt has a past medical history of rheumatoid arthritis with rheumatoid nodules on Humira per notes.  Patient also with history of chronic bilateral foot ulcers ongoing since 2022.  He was last seen by ID in 3/23 for group A strep bacteremia at leg tenosynovitis with left foot osteomyelitis.  He underwent left BKA on 3/9/2023.  He presented complaining of altered mentation and fevers.  CT abdomen pelvis relatively unremarkable but lower lung bases with infiltrates.  CT leg with enhancing fluid collection below-knee amputation, compatible with abscess.  Abscess abuts the distal tibial stump concerning for changes of osteomyelitis. Blood cultures on 10/18 +MSSA. Blood cultures on 10/20 are no growth to date. TTE on 10/23 with no vegetations, heavily calcified aortic valve positive bicuspid, moderate aortic valve stenosis, moderate aortic insufficiency, aortic root is dilated to 4.5 cm. OR on 10/19 for I&D of abscess, down to level of bone, OR cultures + Streptococcus agalactiae and MSSA. OR again on 10/22 for revision of left BKA abscess and primary closure, OR cultures are so far no growth. CT chest noncontrast on 10/21 with pleural effusion, bilateral pulmonary opacifications pneumonitis or pneumonia possible. 6 week course of cefazolin 2 g every 8 hours given lab confirmation of MSSA infection, the coag negative  staph is likely contaminant but will continue to follow-up blood cultures. Cefazolin also treat the Streptococcus from wound culture. 6-week IV antibiotic course for bacteremia and osteomyelitis, end 12/1/23 11/28/23- This evaluation was conducted via Zoom using secure and encrypted videoconferencing technology. The patient was in their home in the [facility].    The patient's identity was confirmed and verbal consent was obtained for this virtual visit.  Today Patient reports feeling well. Pt stating that the wound is healing well but does have one small area with a small amount of yellowish drainage along surgical suture line.  Patient will obtain new wound photo and upload to epic. Previous wound pictures reviewed in EPIC. Denies pungent odor, redness, pain. Denies feeling generally ill, fevers/chills, general malaise, headache, n/v/d.  Patient reports that he is tolerating IV cefazolin with no complaints of side effects.  Most recent labs reviewed from 11/27/2023, WBC WNL 8.2, stable anemia 9.9/31.8, CMP mostly unremarkable besides mild hypokalemia 3.3 and stable.  Due to continuous drainage and risk for worsening infection we will extend antibiotic therapy with oral antibiotics, p.o. Augmentin 875/125 mg twice daily.  Total 8-week course    Current Antimicrobials: IV cefazolin 2 g every 8 hours, 6-week course, end date 12/1/2023  Previous Antimicrobials:    Other Current Medications:  Home Medications    Medication Sig Taking? Last Dose Authorizing Provider   NS SOLN 100 mL with ceFAZolin 2 g SOLR 2 g Infuse 2 g into a venous catheter every 8 hours for 35 days. Yes Taking Leo Davison M.D.   fluconazole (DIFLUCAN) 100 MG Tab Take 1 Tablet by mouth every day.   Keaton Pisano M.D.   methylPREDNISolone (MEDROL) 4 MG Tab Take 1 Tablet by mouth 3 times a day.   Keaton Pisano M.D.   omeprazole (PRILOSEC) 20 MG delayed-release capsule Take 1 Capsule by mouth every day.    Keaton Pisano M.D.   zolpidem (AMBIEN) 5 MG Tab Take 1 Tablet by mouth at bedtime as needed for Sleep for up to 30 days.   Keaton Pisano M.D.   pregabalin (LYRICA) 150 MG Cap Take 1 Capsule by mouth in the morning, at noon, and at bedtime for 30 days.   Keaton Pisano M.D.   furosemide (LASIX) 20 MG Tab Take 1 Tablet by mouth 2 times a day as needed (water retention). Take 1 tablet daily and may take additional 1 tablet as need for increase in weight by 3 lb/ water retention.   Keaton Pisano M.D.   DULoxetine (CYMBALTA) 60 MG Cap DR Particles delayed-release capsule Take 1 Capsule by mouth every day.   Keaton Pisano M.D.   dapagliflozin propanediol (FARXIGA) 10 MG Tab Take 1 Tablet by mouth every day for 90 days.   Keaton Pisano M.D.   morphine (MS IR) 30 MG tablet Take 30 mg by mouth 4 times a day.   Physician Outpatient   MOVANTIK 25 MG Tab Take 25 mg by mouth every day.   Physician Outpatient   multivitamin Tab Take 1 Tablet by mouth every day.   Physician Outpatient   vitamin D3 (CHOLECALCIFEROL) 1000 Unit (25 mcg) Tab Take 1 Tablet by mouth every day.   Shaista Guerra D.O.   acetaminophen (TYLENOL) 500 MG Tab Take 2 Tablets by mouth in the morning, at noon, and at bedtime.   Shaista Guerra D.O.   Adalimumab (HUMIRA PEN) 40 MG/0.4ML Pen-injector Kit INJECT 40MG SUBCUTANEOUSLY  EVERY 2 WEEKS      albuterol 108 (90 Base) MCG/ACT Aero Soln inhalation aerosol Inhale 2 Puffs every 6 hours as needed for Shortness of Breath.   Tim Lovell P.A.-C.   Naloxone (NARCAN) 4 MG/0.1ML Liquid One spray in one nostril for overdose and call 911.   Gerardo Gutierrez M.D.        [facility]:  Past Medical History:   Diagnosis Date    ADD (attention deficit disorder)     Alcohol abuse     Allergic rhinitis 5/21/2009    Anemia 09/2019    Anxiety 5/21/2009    Apnea, sleep     Arrhythmia     afib when hospitaized for infection    Back pain 5/21/2009    Bipolar disorder  ([facility])     Bleeding ulcer 5/21/2009    Bleeding ulcer 5/21/2009    History of anemia-now on nexium     Bowel habit changes     constipation related to narcotics    Daytime sleepiness     Depression 5/21/2009    Eczema 5/21/2009    GERD (gastroesophageal reflux disease)     Heart burn     on meds    Heart murmur     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    Hemorrhagic disorder ([facility])     hx of bleeding ulcers    History of bleeding ulcers 2/12/2015    Hypertension 05/03/2021    pt states well controlled on meds    Insomnia 5/21/2009    Migraine 5/21/2009    Morning headache     Obesity, morbid ([facility]) 5/21/2009    Osteomyelitis ([facility]) 10/7/2019    Pain 09/2019    Chronic knee and back pain    Pubic ramus fracture ([facility]) 9/28/2019    Rheumatoid arteritis ([facility]) 09/2019    knee, feet right hand    Sleep apnea 5/21/2009    Did not tolerate cpap, does not use it    Snoring      Past Surgical History:   Procedure Laterality Date    INCISION AND DRAINAGE ORTHOPEDIC Left 10/22/2023    Procedure: REVISION, BELOW KNEE AMPUTATION;  Surgeon: River Colindres M.D.;  Location: [facility];  Service: Orthopedics    IRRIGATION & DEBRIDEMENT GENERAL Left 10/19/2023    Procedure: IRRIGATION AND DEBRIDEMENT, WOUND;  Surgeon: Jay Roe M.D.;  Location: [facility];  Service: Trauma    KNEE AMPUTATION BELOW Left 3/9/2023    Procedure: AMPUTATION, BELOW KNEE;  Surgeon: Wayne Chambers M.D.;  Location: [facility];  Service: Orthopedics    PB TOTAL KNEE ARTHROPLASTY Right 7/22/2020    Procedure: ARTHROPLASTY, KNEE, TOTAL;  Surgeon: Temo Pires M.D.;  Location: [facility];  Service: Orthopedics    TENDON TRANSFER Right 1/22/2020    Procedure: RIGHT DISTAL ULNA RESECTION AND EXTENSOR TENDON TRANSFER;  Surgeon: Marine Rushing M.D.;  Location: [facility];  Service: Orthopedics    OTHER ORTHOPEDIC SURGERY Right 2020    total right knee replacement    IRRIGATION & DEBRIDEMENT  ORTHO Left 10/9/2019    Procedure: IRRIGATION AND DEBRIDEMENT, WOUND - PELVIC OSTEOMYELITIS;  Surgeon: You Olivares M.D.;  Location: [facility];  Service: Orthopedics    OTHER SURGICAL PROCEDURE      osteomelitis of pelvis cleaned    GASTRIC BYPASS LAPAROSCOPIC      Lucila en y    CHOLECYSTECTOMY      OTHER SURGICAL PROCEDURE      repair of broken penis     Family History   Problem Relation Age of Onset    Hypertension Mother     Arthritis Mother     Depression Mother     Cancer Father         bladder    Schizophrenia Father     Cancer Maternal Grandmother         breast    Heart Disease Maternal Grandmother     Psychiatric Illness Other     Stroke Maternal Aunt     Other Neg Hx         no connective tissue disorders or known aortic disease     Social History     Socioeconomic History    Marital status:      Spouse name: Not on file    Number of children: Not on file    Years of education: Not on file    Highest education level: Bachelor's degree (e.g., BA, AB, BS)   Occupational History    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   Tobacco Use    Smoking status: Some Days     Current packs/day: 0.00     Types: Cigarettes, Cigars     Last attempt to quit: 2023     Years since quittin.8     Passive exposure: Current    Smokeless tobacco: Never    Tobacco comments:     occasional cigar   Vaping Use    Vaping Use: Never used   Substance and Sexual Activity    Alcohol use: No     Alcohol/week: 0.0 oz     Comment: quit 2011- past ETOH abuse    Drug use: No    Sexual activity: Yes     Partners: Female     Comment: ;    Other Topics Concern     Service No    Blood Transfusions Yes    Caffeine Concern No    Occupational Exposure No    Hobby Hazards No    Sleep Concern No    Stress Concern Yes    Weight Concern Yes    Special Diet No    Back Care No    Exercise No    Bike Helmet No    Seat Belt Yes    Self-Exams Yes   Social History Narrative    , lives in [place]  "    Social Determinants of Health     Financial Resource Strain: Low Risk  (9/7/2023)    Overall Financial Resource Strain (CARDIA)     Difficulty of Paying Living Expenses: Not very hard   Food Insecurity: No Food Insecurity (9/7/2023)    Hunger Vital Sign     Worried About Running Out of Food in the Last Year: Never true     Ran Out of Food in the Last Year: Never true   Transportation Needs: Unmet Transportation Needs (10/30/2023)    PRAPARE - Transportation     Lack of Transportation (Medical): Yes     Lack of Transportation (Non-Medical): Yes   Physical Activity: Sufficiently Active (9/7/2023)    Exercise Vital Sign     Days of Exercise per Week: 7 days     Minutes of Exercise per Session: 30 min   Stress: Stress Concern Present (9/7/2023)    [employer] [place] [employer] - Occupational Stress Questionnaire     Feeling of Stress : Rather much   Social Connections: Socially Integrated (9/7/2023)    Social Connection and Isolation Panel [NHANES]     Frequency of Communication with Friends and Family: More than three times a week     Frequency of Social Gatherings with Friends and Family: Twice a week     Attends Sikhism Services: More than 4 times per year     Active Member of Clubs or Organizations: Yes     Attends Club or Organization Meetings: More than 4 times per year     Marital Status:    Intimate Partner Violence: Not At Risk (9/7/2023)    Humiliation, Afraid, Rape, and Kick questionnaire     Fear of Current or Ex-Partner: No     Emotionally Abused: No     Physically Abused: No     Sexually Abused: No   Housing Stability: Low Risk  (9/7/2023)    Housing Stability Vital Sign     Unable to Pay for Housing in the Last Year: No     Number of Places Lived in the Last Year: 1     Unstable Housing in the Last Year: No           Allergies/Intolerances:  Allergies   Allergen Reactions    Nsaids      Bleeding, \"I had a bleeding ulcer\"    Diphenhydramine Unspecified     \"I get agitated\"    " "Mirtazapine Unspecified     I had a hard time waking up, lacking mental focus.     Penicillins      \"Had some dizziness\"  Tolerated Unasyn 10/2019  Tolerated Zosyn 02/2023    Promethazine Unspecified     \"I get very agitated\"       ROS:   Review of Systems   Constitutional:  Negative for chills, fever, malaise/fatigue and weight loss.   HENT:  Negative for congestion and hearing loss.    Eyes:  Negative for blurred vision and double vision.   Respiratory:  Negative for cough, sputum production, shortness of breath and wheezing.    Cardiovascular:  Negative for chest pain and palpitations.   Gastrointestinal:  Negative for abdominal pain, nausea and vomiting.   Genitourinary:  Negative for dysuria.   Musculoskeletal:  Negative for myalgias.        Wound is healing well but does have one small area with a small amount of yellowish drainage along surgical suture line.   Skin:  Negative for itching and rash.   Neurological:  Negative for dizziness, focal weakness and headaches.   Endo/Heme/Allergies:  Does not bruise/bleed easily.   Psychiatric/Behavioral:  The patient is not nervous/anxious.       ROS was reviewed and were negative except as above.    Objective    Most Recent Vital Signs:  Height 1.803 m (5' 11\")   Weight 93 kg (205 lb)   Body Mass Index 28.59 kg/m²     Physical Exam:       Pertinent Lab/Imaging Results:  [unfilled]  @CMP@  WBC   Date/Time Value Ref Range Status   11/27/2023 02:30 PM 8.2 4.8 - 10.8 K/uL Final   10/19/2010 12:05 PM 8.4 4.0 - 10.5 x10E3/uL Final     RBC   Date/Time Value Ref Range Status   11/27/2023 02:30 PM 3.51 (L) 4.70 - 6.10 M/uL Final   10/19/2010 12:05 PM 4.96 4.20 - 6.00 x10E6/uL Final     Hemoglobin   Date/Time Value Ref Range Status   11/27/2023 02:30 PM 9.9 (L) 14.0 - 18.0 g/dL Final     Hematocrit   Date/Time Value Ref Range Status   11/27/2023 02:30 PM 31.8 (L) 42.0 - 52.0 % Final     MCV   Date/Time Value Ref Range Status   11/27/2023 02:30 PM 90.6 81.4 - 97.8 fL Final "   10/19/2010 12:05 PM 85 80 - 98 fL Final     MCH   Date/Time Value Ref Range Status   11/27/2023 02:30 PM 28.2 27.0 - 33.0 pg Final   10/19/2010 12:05 PM 28.4 27.0 - 34.0 pg Final     MCHC   Date/Time Value Ref Range Status   11/27/2023 02:30 PM 31.1 (L) 32.3 - 36.5 g/dL Final     Comment:     Please note new reference range effective 05/22/2023.     MPV   Date/Time Value Ref Range Status   11/27/2023 02:30 PM 10.2 9.0 - 12.9 fL Final      Sodium   Date/Time Value Ref Range Status   11/27/2023 02:30  135 - 145 mmol/L Final     Potassium   Date/Time Value Ref Range Status   11/27/2023 02:30 PM 3.3 (L) 3.6 - 5.5 mmol/L Final     Chloride   Date/Time Value Ref Range Status   11/27/2023 02:30  96 - 112 mmol/L Final     Co2   Date/Time Value Ref Range Status   11/27/2023 02:30 PM 26 20 - 33 mmol/L Final     Glucose   Date/Time Value Ref Range Status   11/27/2023 02:30 PM 95 65 - 99 mg/dL Final     Bun   Date/Time Value Ref Range Status   11/27/2023 02:30 PM 8 8 - 22 mg/dL Final     Creatinine   Date/Time Value Ref Range Status   11/27/2023 02:30 PM 0.63 0.50 - 1.40 mg/dL Final   10/19/2010 12:05 PM 0.95 0.76 - 1.27 mg/dL Final     Bun-Creatinine Ratio   Date/Time Value Ref Range Status   10/19/2010 12:05 PM 9 8 - 27 Final     Glom Filt Rate, Est   Date/Time Value Ref Range Status   10/19/2010 12:05 PM >59 >59 mL/min/1.73 Final     Alkaline Phosphatase   Date/Time Value Ref Range Status   11/27/2023 02:30  (H) 30 - 99 U/L Final     AST(SGOT)   Date/Time Value Ref Range Status   11/27/2023 02:30 PM 12 12 - 45 U/L Final     ALT(SGPT)   Date/Time Value Ref Range Status   11/27/2023 02:30 PM <5 2 - 50 U/L Final     Total Bilirubin   Date/Time Value Ref Range Status   11/27/2023 02:30 PM 0.3 0.1 - 1.5 mg/dL Final      CPK Total   Date/Time Value Ref Range Status   10/01/2019 04:23 AM 26 0 - 154 U/L Final      Recent Labs     11/27/23  1430   ASTSGOT 12   ALTSGPT <5   TBILIRUBIN 0.3   ALKPHOSPHAT 122*    GLOBULIN 4.1*     Blood Culture Hold   Date Value Ref Range Status   10/20/2023 Collected  Final       Blood Culture Hold   Date Value Ref Range Status   10/20/2023 Collected  Final          ntains abnormal data CULTURE TISSUE W/ GRM STAIN  Order: [id number]  Status: Final result    Visible to patient: Yes (not seen)    Next appt: 11/28/2023 at 09:00 AM in Infectious Diseases (Tayo Shell, A.P.R.N.)    Specimen Information: Tissue   0 Result Notes      Component 1 mo ago   Significant Indicator POS Positive (POS)   Source TISS   Site Left Leg #2   Culture Result - Abnormal    Gram Stain Result  Abnormal   Few WBCs.  Few Gram positive cocci.    Culture Result  Abnormal   Streptococcus agalactiae (Group B)  Moderate growth    Culture Result  Abnormal   Staphylococcus aureus  Moderate growth  Methicillin sensitive by screening method  See previous culture for sensitivity report.    Resulting Agency M              Narrative  Performed by: M  Surgery Specimen      Specimen Collected: 10/19/23  9:46 AM Last Resulted: 10/21/23 11:23 AM           CULTURE TISSUE W/ GRM STAIN  Order: [id number]  Status: Final result    Visible to patient: Yes (not seen)    Next appt: 11/28/2023 at 09:00 AM in Infectious Diseases (Tayo Shell, A.P.R.N.)    Specimen Information: Tissue   0 Result Notes      Component 1 mo ago   Significant Indicator POS Positive (POS)   Source TISS   Site Left Leg #1   Culture Result - Abnormal    Gram Stain Result  Abnormal   Moderate WBCs.  Many Gram positive cocci.    Culture Result  Abnormal   Streptococcus agalactiae (Group B)  Heavy growth    Culture Result  Abnormal   Staphylococcus aureus  Moderate growth    Resulting Agency M        Susceptibility     Staphylococcus aureus     MARÍA     Ampicillin/sulbactam 16/8 mcg/mL Intermediate     Azithromycin >4 mcg/mL Resistant     Cefazolin <=8 mcg/mL Sensitive     Cefepime <=4 mcg/mL Sensitive     Clindamycin 0.5 mcg/mL Sensitive     Daptomycin 1 mcg/mL  Sensitive     Erythromycin >4 mcg/mL Resistant     Oxacillin 1 mcg/mL Sensitive     Pip/Tazobactam <=8 mcg/mL Sensitive     Tetracycline <=4 mcg/mL Sensitive     Trimeth/Sulfa <=0.5/9.5 m... Sensitive     Vancomycin 1 mcg/mL Sensitive                 Narrative  Performed by: AUBRIE  Surgery Specimen      Specimen Collected: 10/19/23  9:45 AM Last Resulted: 10/21/23 11:22 AM               Blood Culture - Draw one set from central line if present  Order: [id number]  Status: Final result    Visible to patient: Yes (seen)    Next appt: 11/28/2023 at 09:00 AM in Infectious Diseases (Tayo Tyler, A.P.R.N.)    Specimen Information: Peripheral; Blood   0 Result Notes      Component 1 mo ago   Significant Indicator POS Positive (POS)   Source BLD   Site PERIPHERAL   Culture Result  Abnormal   Growth detected by Bactec instrument. 10/19/2023  17:35  Staphylococcus aureus (methicillin sensitive)  detected by PCR.    Culture Result Staphylococcus aureus Abnormal    Culture Result  Abnormal   Diphtheroids  Possible Contaminant  Isolated from one set only, please correlate with clinical  condition. Contact the Microbiology department within 48 hr  if identification and susceptibility are needed.    Resulting Agency M        Susceptibility     Staphylococcus aureus     MARÍA     Ampicillin/sulbactam <=8/4 mcg/mL Sensitive     Azithromycin >4 mcg/mL Resistant     Cefazolin <=8 mcg/mL Sensitive     Cefepime <=4 mcg/mL Sensitive     Clindamycin 0.5 mcg/mL Sensitive     Daptomycin 1 mcg/mL Sensitive     Erythromycin >4 mcg/mL Resistant     Oxacillin 1 mcg/mL Sensitive     Pip/Tazobactam <=8 mcg/mL Sensitive     Tetracycline <=4 mcg/mL Sensitive     Trimeth/Sulfa <=0.5/9.5 m... Sensitive     Vancomycin 1 mcg/mL Sensitive                 Narrative  Performed by: AUBRIE  CALL  Swann  [facility] tel. [phone],  CALLED  [facility] tel. [phone] 10/19/2023, 17:38, RB PERF. RESULTS CALLED TO:  Ame CUEVAS, [id number] and [facility]  Blood Cultures X2. Draw one blood  culture from central line  (including implanted port) and one blood culture  peripherally. If no central line present draw blood cultures  times two peripherally from different sites  Release to patient->Immediate  Right Forearm/Arm      Specimen Collected: 10/18/23  8:05 PM Last Resulted: 10/21/23  1:04 P         ntains abnormal data Blood Culture - Draw one set from central line if present  Order: [id number]  Status: Final result    Visible to patient: Yes (seen)    Next appt: 11/28/2023 at 09:00 AM in Infectious Diseases (Tayo Tyler, A.P.R.N.)    Specimen Information: Line; Blood   0 Result Notes      Component 1 mo ago   Significant Indicator POS Positive (POS)   Source BLD   Site peripheral   Culture Result  Abnormal   Growth detected by Bactec instrument. 10/19/2023  20:15  Methicillin Resistant Staphylococcus aureus (MRSA)  detected by PCR.  Correct result: Methicillin Sensitive Staphylococcus aureus.  Culture is mixed with a Coagulase Negative Staphylococcus  (CNS), resulting in a false positive MRSA by PCR.  Erroneous result:Methicillin Resistant Staphylococcus aureus  (MRSA)  detected by PCR.    Culture Result  Abnormal   Staphylococcus aureus  Methicillin sensitive by screening method  Please see previous culture for susceptibilities    Culture Result  Abnormal   Coagulase-negative Staphylococcus species  Two biotypes present  Possible Contaminant  Isolated from one set only, please correlate with clinical  condition. Contact the Microbiology department within 48 hr  if identification and susceptibility are needed.    Resulting Agency M              Narrative  Performed by: M  CALL  Swann  [facility] tel. [phone],  CALLED  [facility] tel. [phone] 10/20/2023, 14:27, RB PERF. RESULTS CALLED TO:  FAITH Berg [id number] with Melissa report  Blood Cultures X2. Draw one blood culture from central line  (including implanted port) and one blood culture  peripherally. If no central line present draw blood cultures  times two  peripherally from different sites  Release to patient->Immediate  Right Forearm/Arm      Specimen Collected: 10/18/23  9:23 PM Last Resulted: 10/21/23  1:07 PM             Impression/Assessment      1. MSSA bacteremia        2. Polymicrobial bacterial infection        3. Hx of BKA, left (HCC)        4. Osteomyelitis of left leg (HCC)        5. Rheumatoid arthritis, involving unspecified site, unspecified whether rheumatoid factor present ([facility])        6. History of immunosuppressive therapy            Richard Hubbard II is a 48 y.o.  admitted 10/18/2023. Pt has a past medical history of rheumatoid arthritis with rheumatoid nodules on Humira per notes.  Patient also with history of chronic bilateral foot ulcers ongoing since 2022.  He was last seen by ID in 3/23 for group A strep bacteremia at leg tenosynovitis with left foot osteomyelitis.  He underwent left BKA on 3/9/2023.  He presented complaining of altered mentation and fevers.  CT abdomen pelvis relatively unremarkable but lower lung bases with infiltrates.  CT leg with enhancing fluid collection below-knee amputation, compatible with abscess.  Abscess abuts the distal tibial stump concerning for changes of osteomyelitis. Blood cultures on 10/18 +MSSA. Blood cultures on 10/20 are no growth to date. TTE on 10/23 with no vegetations, heavily calcified aortic valve positive bicuspid, moderate aortic valve stenosis, moderate aortic insufficiency, aortic root is dilated to 4.5 cm. OR on 10/19 for I&D of abscess, down to level of bone, OR cultures + Streptococcus agalactiae and MSSA. OR again on 10/22 for revision of left BKA abscess and primary closure, OR cultures are so far no growth. CT chest noncontrast on 10/21 with pleural effusion, bilateral pulmonary opacifications pneumonitis or pneumonia possible. 6 week course of cefazolin 2 g every 8 hours given lab confirmation of MSSA infection, the coag negative staph is likely contaminant but will continue to  follow-up blood cultures. Cefazolin also treat the Streptococcus from wound culture. 6-week IV antibiotic course for bacteremia and osteomyelitis, end 12/1/23.  Then transition to p.o. Augmentin 875/125 mg twice daily for additional 14 days.  Total 8-week course    11/28/23- This evaluation was conducted via Zoom using secure and encrypted videoconferencing technology. The patient was in their home in the [facility].    The patient's identity was confirmed and verbal consent was obtained for this virtual visit.  Today Patient reports feeling well. Pt stating that the wound is healing well but does have one small area with a small amount of yellowish drainage along surgical suture line.  Patient will obtain new wound photo and upload to epic. Previous wound pictures reviewed in EPIC. Denies pungent odor, redness, pain. Denies feeling generally ill, fevers/chills, general malaise, headache, n/v/d.  Patient reports that he is tolerating IV cefazolin with no complaints of side effects.  Most recent labs reviewed from 11/27/2023, WBC WNL 8.2, stable anemia 9.9/31.8, CMP mostly unremarkable besides mild hypokalemia 3.3 and stable.  Due to continuous drainage and risk for worsening infection we will extend antibiotic therapy with oral antibiotics, p.o. Augmentin 875/125 mg twice daily.  Total 8-week course    PLAN:   - Continue 6 week course of cefazolin 2 g every 8 hours, end date 12/1/23.  Then transition to p.o. Augmentin 875/125 mg twice daily for 2 additional weeks.  Total 8-week course of antibiotic therapy  - Ok to remove PICC after last infusion   - Repeat Labs CBC/CMP weekly.  Repeat labs 1 week after starting p.o. Augmentin  - Medication education provided and S/S of side effects discussed   - Recommend routine follow up with PCP  - Continue wound care to Left leg. Home health [facility]   - Recommended to start po probiotic  - Education provided on S/S of infection and when to report to ER/ call 911       Return  visit: 3 weeks. Follow up with primary care physician for chronic medical problems      I have performed a physical exam,  updated ROS and plan today. I have reviewed previous images, labs, and provider notes.      JOSIANE Vigil.    All Patients should seek medical re-evaluation or report to the ER for new, increasing or worsening symptoms. In some circumstances medical conditions can change from the initial evaluation and may require emergent medical re-evaluation. This includes but is not limited to chest pain, shortness of breath, atypical abdominal pain, atypical headache, ALOC, fever >101, low blood pressure, high respiratory rate (above 30), low oxygen saturation (below 90%), acute delirium, abnormal bleeding, inability to tolerate any intake, weakness on one side of the body, any worsened or concerning conditions.    Please note that this dictation was created using voice recognition software. I have worked with technical experts from [facility] to optimize the interface.  I have made every reasonable attempt to correct obvious errors, but there may be errors of grammar and possibly content that I did not discover before finalizing the note.

## 2023-12-01 NOTE — OR NURSING
When I called Richard [place] for a scheduled 1:00PM appointment on 12/1/23, he stated he needed to reschedule. I gave him the telephone number for pre-registration to reschedule RN telephone appointment.

## 2023-12-06 PROBLEM — S93.314A: Status: ACTIVE | Noted: 2023-01-01

## 2023-12-06 PROBLEM — S93.314A DISLOCATION OF RIGHT SUBTALAR JOINT: Status: ACTIVE | Noted: 2023-01-01

## 2023-12-06 NOTE — PROGRESS NOTES
Medication history reviewed with PT at bedside    Med rec is complete per PT reporting    Allergies reviewed.     PT is currently taking Augmentin 875mg. 14 day course. Started 12/04/2023. Last dose 12/06/2023 at 1000    Patient is not taking anticoagulants.    Preferred pharmacy for this visit - [facility] on [address] in [place] ([phone])

## 2023-12-06 NOTE — ANESTHESIA PROCEDURE NOTES
Peripheral Block    Date/Time: 12/6/2023 2:28 PM    Performed by: Walt Razo M.D.  Authorized by: Walt Razo M.D.    Patient Location:  OR  Start Time:  12/6/2023 2:28 PM  Reason for Block: at surgeon's request and post-op pain management ONLY    patient identified, IV checked, site marked, risks and benefits discussed, surgical consent, monitors and equipment checked, pre-op evaluation and timeout performed    Patient Position:  Left lateral decubitus  Prep: ChloraPrep    Monitoring:  Heart rate, continuous pulse ox and cardiac monitor  Block Region:  Lower Extremity  Lower Extremity - Block Type:  SCIATIC nerve block, lateral approach    Laterality:  Right  Procedures: ultrasound guided  Image captured, interpreted and electronically stored.  Strength:  1 %  Dose:  3 ml  Block Type:  Single-shot  Needle Length:  50mm  Needle Gauge:  22 G  Needle Localization:  Ultrasound guidance  Ultrasound picture in chart  Injection Assessment:  Negative aspiration for heme, no paresthesia on injection, incremental injection and local visualized surrounding nerve on ultrasound  Evidence of intravascular injection: No     US Guided Sciatic Nerve Block   US probe placed several cm proximal to popliteal crease on posterior thigh and scanned caudad and cephalad until Sciatic Nerve (SN) identified superficial/lateral to popliteal artery.  Needle inserted lateral to probe in an in plane approach under direct visualization to a perineural position.  After negative aspiration LA injected with ease and visualized surrounding the SN.

## 2023-12-06 NOTE — H&P
Hospital Medicine History & Physical Note    Date of Service  12/6/2023    Primary Care Physician  Johnathan Dover M.D.    Consultants  Orthopedic Surgery    Code Status  Full Code    Chief Complaint  Right subtalar dislocation    History of Presenting Illness  [place] II is a 48 y.o. male who presented 12/6/2023 with a progressive flatfoot deformity/superficial wound who presents for open reduction and internal fixation of the right subtalar dislocation.    Medicine consulted to admit postop due to patient's complex medical history.    Patient seen at bedside preop, has no complaints at this time.    I discussed the plan of care with patient.    Review of Systems  Review of Systems   Constitutional: Negative.    HENT: Negative.     Eyes: Negative.    Respiratory: Negative.     Cardiovascular: Negative.    Gastrointestinal: Negative.    Genitourinary: Negative.    Musculoskeletal: Negative.    Skin: Negative.    Neurological: Negative.    Endo/Heme/Allergies: Negative.    Psychiatric/Behavioral: Negative.         Past Medical History   has a past medical history of ADD (attention deficit disorder), Alcohol abuse, Allergic rhinitis (05/21/2009), Anemia (09/2019), Anxiety (05/21/2009), Apnea, sleep, Arrhythmia, Back pain (05/21/2009), Bipolar disorder ([facility]), Bleeding ulcer (05/21/2009), Bleeding ulcer (05/21/2009), Bowel habit changes, Breath shortness, Congenital anomaly of mitral valve, Congestive heart failure ([facility]), Daytime sleepiness, Dental disorder, Depression (05/21/2009), Eczema (05/21/2009), GERD (gastroesophageal reflux disease), Heart burn, Heart murmur, Hemorrhagic disorder ([facility]), History of bleeding ulcers (02/12/2015), Hypertension (05/03/2021), Insomnia (05/21/2009), Intestinal hernia, Migraine (05/21/2009), Morning headache, Obesity, morbid ([facility]) (05/21/2009), Osteomyelitis ([facility]) (10/07/2019), Pain (09/2019), Pneumonia, Pubic ramus fracture ([facility]) (09/28/2019), Rheumatoid arteritis  "([facility]) (09/2019), Sleep apnea (05/21/2009), and Snoring.    Surgical History   has a past surgical history that includes gastric bypass laparoscopic (2000); irrigation & debridement ortho (Left, 10/09/2019); other surgical procedure; other surgical procedure (2019); pr total knee arthroplasty (Right, 07/22/2020); cholecystectomy (2000); tendon transfer (Right, 01/22/2020); other orthopedic surgery (Right, 2020); knee amputation below (Left, 03/09/2023); irrigation & debridement general (Left, 10/19/2023); and incision and drainage orthopedic (Left, 10/22/2023).     Family History  family history includes Arthritis in his mother; Cancer in his father and maternal grandmother; Depression in his mother; Heart Disease in his maternal grandmother; Hypertension in his mother; Psychiatric Illness in an other family member; Schizophrenia in his father; Stroke in his maternal aunt.   Family history reviewed with patient. There is no family history that is pertinent to the chief complaint.     Social History   reports that he has been smoking cigarettes and cigars. He has been exposed to tobacco smoke. He has never used smokeless tobacco. He reports that he does not drink alcohol and does not use drugs.    Allergies  Allergies   Allergen Reactions    Nsaids      Bleeding, \"I had a bleeding ulcer\"    Diphenhydramine Unspecified     \"I get agitated\"    Mirtazapine Unspecified     I had a hard time waking up, lacking mental focus.     Penicillins      \"Had some dizziness\"  Tolerated Unasyn 10/2019  Tolerated Zosyn 02/2023    Promethazine Unspecified     \"I get very agitated\"       Medications  Prior to Admission Medications   Prescriptions Last Dose Informant Patient Reported? Taking?   Adalimumab (HUMIRA PEN) 40 MG/0.4ML Pen-injector Kit 11/20/2023 at Q 14 DAYS Patient No No   Sig: INJECT 40MG SUBCUTANEOUSLY  EVERY 2 WEEKS   Patient taking differently: Inject 40 mg into the shoulder, thigh, or buttocks every 14 days. "   DULoxetine (CYMBALTA) 60 MG Cap DR Particles delayed-release capsule 12/6/2023 at 1000 Patient No No   Sig: Take 1 Capsule by mouth every day.   MORPHINE SULFATE ER PO 12/6/2023 at 1000 Patient Yes No   Sig: Take 15 mg by mouth 2 times a day.   MOVANTIK 25 MG Tab 12/6/2023 at 1000 Patient Yes No   Sig: Take 25 mg by mouth every day.   Naloxone (NARCAN) 4 MG/0.1ML Liquid PRN at PRN Patient No No   Sig: One spray in one nostril for overdose and call 911.   Patient taking differently: Administer 1 Spray into affected nostril(S) one time. One spray in one nostril for overdose and call 911.   acetaminophen (TYLENOL) 500 MG Tab 12/4/2023 at UNK Patient No No   Sig: Take 2 Tablets by mouth in the morning, at noon, and at bedtime.   albuterol 108 (90 Base) MCG/ACT Aero Soln inhalation aerosol 12/6/2023 at 1200 Patient No No   Sig: Inhale 2 Puffs every 6 hours as needed for Shortness of Breath.   amoxicillin-clavulanate (AUGMENTIN) 875-125 MG Tab 12/6/2023 at 1000 Patient No No   Sig: Take 1 Tablet by mouth 2 times a day for 14 days.   dapagliflozin propanediol (FARXIGA) 10 MG Tab 12/1/2023 at HELD Patient No No   Sig: Take 1 Tablet by mouth every day for 90 days.   fluconazole (DIFLUCAN) 100 MG Tab UNK at UNK Patient No No   Sig: Take 1 Tablet by mouth every day.   furosemide (LASIX) 20 MG Tab 12/6/2023 at 1000 Patient No No   Sig: Take 1 Tablet by mouth 2 times a day as needed (water retention). Take 1 tablet daily and may take additional 1 tablet as need for increase in weight by 3 lb/ water retention.   hydrOXYzine pamoate (VISTARIL) 25 MG Cap POST OP at POST OP Patient No No   Sig: Take 1 Capsule by mouth 3 times a day as needed for Itching or Other (nausea or insomnia).   methylPREDNISolone (MEDROL) 4 MG Tab 12/5/2023 at AM Patient No No   Sig: Take 1 Tablet by mouth 3 times a day.   morphine (MS IR) 30 MG tablet 12/6/2023 at 1000 Patient Yes No   Sig: Take 30 mg by mouth 4 times a day.   multivitamin Tab 12/1/2023  at HELD Patient Yes No   Sig: Take 1 Tablet by mouth every day.   omeprazole (PRILOSEC) 20 MG delayed-release capsule 12/6/2023 at 1000 Patient No No   Sig: Take 1 Capsule by mouth every day.   vitamin D3 (CHOLECALCIFEROL) 1000 Unit (25 mcg) Tab 12/1/2023 at HELD Patient No No   Sig: Take 1 Tablet by mouth every day.      Facility-Administered Medications: None       Physical Exam  Temp:  [36.8 °C (98.2 °F)] 36.8 °C (98.2 °F)  Pulse:  [104] 104  Resp:  [22] 22  BP: (114)/(68) 114/68  SpO2:  [97 %] 97 %  Blood Pressure: 114/68   Temperature: 36.8 °C (98.2 °F)   Pulse: (!) 104   Respiration: (!) 22 (RN notified.)   Pulse Oximetry: 97 %       Physical Exam  Constitutional:       Appearance: Normal appearance. He is normal weight.   HENT:      Head: Normocephalic.      Nose: Nose normal.      Mouth/Throat:      Mouth: Mucous membranes are moist.   Eyes:      Pupils: Pupils are equal, round, and reactive to light.   Cardiovascular:      Rate and Rhythm: Normal rate and regular rhythm.      Pulses: Normal pulses.   Pulmonary:      Effort: Pulmonary effort is normal.      Breath sounds: Normal breath sounds.   Abdominal:      General: Abdomen is flat. Bowel sounds are normal.      Palpations: Abdomen is soft.   Musculoskeletal:      Cervical back: Neck supple.      Comments: Left BKA  Right ankle externally rotated, cool to touch, dorsalis pedis pulse 1+   Skin:     General: Skin is warm.   Neurological:      General: No focal deficit present.      Mental Status: He is alert and oriented to person, place, and time. Mental status is at baseline.   Psychiatric:         Mood and Affect: Mood normal.         Behavior: Behavior normal.         Thought Content: Thought content normal.         Judgment: Judgment normal.         Laboratory:  Recent Labs     12/04/23  1400   WBC 8.7   RBC 3.45*   HEMOGLOBIN 9.5*   HEMATOCRIT 30.5*   MCV 88.4   MCH 27.5   MCHC 31.1*   RDW 50.8*   PLATELETCT 269   MPV 10.6     Recent Labs      "12/04/23  1400   SODIUM 138   POTASSIUM 4.1   CHLORIDE 102   CO2 25   GLUCOSE 77   BUN 9   CREATININE 0.60   CALCIUM 8.5     Recent Labs     12/04/23  1400   ALTSGPT <5   ASTSGOT 9*   ALKPHOSPHAT 117*   TBILIRUBIN 0.4   GLUCOSE 77         No results for input(s): \"NTPROBNP\" in the last 72 hours.      No results for input(s): \"TROPONINT\" in the last 72 hours.    Imaging:  DX-PORTABLE FLUORO > 1 HOUR    (Results Pending)   DX-FOOT-COMPLETE 3+ RIGHT    (Results Pending)       no X-Ray or EKG requiring interpretation    Assessment/Plan:  Justification for Admission Status  I anticipate this patient will require at least two midnights for appropriate medical management, necessitating inpatient admission because patient presents for internal fixation of right subtalar dislocation    Patient will need a Med/Surg bed on SURGICAL service .  The need is secondary to right subtalar dislocation.    * Dislocation of right subtalar joint  Assessment & Plan  Patient to go to the OR by orthopedic surgery  Pain medications postoperatively  PT OT    Acute diastolic heart failure (HCC)- (present on admission)  Assessment & Plan  Continue lasix    Chronic pain- (present on admission)  Assessment & Plan  Continue home meds      Rheumatoid arthritis (HCC)- (present on admission)  Assessment & Plan  On humira         VTE prophylaxis: pharmacologic prophylaxis contraindicated due to OR  "

## 2023-12-06 NOTE — ANESTHESIA PROCEDURE NOTES
Peripheral Block    Date/Time: 12/6/2023 2:28 PM    Performed by: Walt Razo M.D.  Authorized by: Walt Razo M.D.    Patient Location:  OR  Start Time:  12/6/2023 2:28 PM  Reason for Block: at surgeon's request and post-op pain management ONLY    patient identified, IV checked, site marked, risks and benefits discussed, surgical consent, monitors and equipment checked, pre-op evaluation and timeout performed    Patient Position:  Supine  Prep: ChloraPrep    Monitoring:  Heart rate, continuous pulse ox and cardiac monitor  Block Region:  Lower Extremity  Lower Extremity - Block Type:  Selective FEMORAL nerve block at the Adductor Canal    Laterality:  Right  Procedures: ultrasound guided  Image captured, interpreted and electronically stored.  Local Infiltration:  Lidocaine  Strength:  1 %  Dose:  3 ml  Block Type:  Single-shot  Needle Length:  100mm  Needle Gauge:  21 G  Needle Localization:  Ultrasound guidance  Ultrasound picture in chart  Injection Assessment:  Negative aspiration for heme, no paresthesia on injection, incremental injection and local visualized surrounding nerve on ultrasound  Evidence of intravascular injection: No     US Guided Selective Femoral Nerve Block at Adductor Canal:   US probe placed at mid-thigh level on externally rotated leg and femur identified.  Probe directed medially until Sartorius Muscle (SM), Femoral Artery (FA) and Saphenous Nerve (SN) identified in Adductor Canal (AC).  Needle inserted anterolateral to probe in an in plane approach into a subsartorial perivascular perineural position.  After negative aspiration LA injected with ease and visualized spreading within the AC.

## 2023-12-06 NOTE — ANESTHESIA PROCEDURE NOTES
Airway    Date/Time: 12/6/2023 2:36 PM    Performed by: Walt Razo M.D.  Authorized by: Walt Razo M.D.    Location:  OR  Urgency:  Elective  Indications for Airway Management:  Anesthesia      Spontaneous Ventilation: absent    Sedation Level:  Deep  Preoxygenated: Yes    Patient Position:  Sniffing  Mask Difficulty Assessment:  1 - vent by mask  Final Airway Type:  Endotracheal airway  Final Endotracheal Airway:  ETT  Cuffed: Yes    Technique Used for Successful ETT Placement:  Direct laryngoscopy    Insertion Site:  Oral  Blade Type:  David  Laryngoscope Blade/Videolaryngoscope Blade Size:  4  ETT Size (mm):  8.0  Measured from:  Teeth  ETT to Teeth (cm):  22  Placement Verified by: auscultation and capnometry    Cormack-Lehane Classification:  Grade I - full view of glottis  Number of Attempts at Approach:  1

## 2023-12-06 NOTE — LETTER
November 29, 2023    Patient Name: Richard [place] II  Surgeon Name: Calvin Pryor M.D.  Surgery Facility: [facility] ([address])  Surgery Date: 12/6/2023    The time of your surgery is not final and may change up to and until the day of your surgery. You will be contacted 24-48 hours prior to your surgery date with your check-in and surgery time.    If you will not be at one of the below numbers please call the surgery scheduler at [phone]  Preferred Phone: [phone]    BEFORE YOUR SURGERY   Pre Registration and/or Lab Work must be done within and no earlier than 28 days prior to your surgery date. Your scheduled facility will contact you regarding all required preregistration and/or lab work. If you have not been contacted within 7 days of your scheduled procedure please call [facility] at [phone] for an appointment as soon as possible.    The [facility] offers a class for patients undergoing a total hip/knee replacement surgery scheduled at our outpatient surgery center. Information about what to expect for preparation, the day of surgery and recovery will be given. We highly recommend bringing your support for surgery with you to the class, as well as any questions you may have. If you are interested in attending the class, please call [phone] for scheduling.     Pre op Appointment:  Instructions: Bring a list of all medications you are taking including the dosing and frequency.    DAY OF YOUR SURGERY  Nothing to eat or drink after midnight     Refrain from smoking any substance after midnight prior to surgery. Smoking may interfere with the anesthetic and frequently produces nausea during the recovery period.    Continue taking all lifesaving medications. Including the morning of your surgery with small sip of water.    Please do NOT take on the day of surgery:  Diuretics: examples- furosemide (Lasix),  spironolactone, hydrochlorothiazide  ACE-inhibitors: examples- lisinopril, ramipril, enalapril  “ARBs”: examples- losartan, Olmesartan, valsartan    Please arrive at the hospital/surgery center at the check-in time provided.     An adult will need to bring you and take you home after your surgery.     AFTER YOUR SURGERY  Post op Appointment:   Date: 12/19/23   Time: 02:00PM   With: Calvin Pryor MD   Location: [address] [place] [id number]    TIME OFF WORK  FMLA or Disability forms can be faxed directly to: [phone] or you may drop them off at [address] [place] [address]. Our office charges a $35.00 fee per form. Forms will be completed within 10 business days of drop off and payment received. For the status of your forms you may contact our disability office directly [phone].    MEDICATION INSTRUCTIONS **Please read section completely**    The following medications should be stopped a minimum of 10 days prior to surgery:  All over the counter, Supplements & Herbal medications    Anorectics: Phentermine (Adipex-P, Lomaira and Suprenza), Phentermine-topiramate (Qsymia), Bupropion-naltrexone (Contrave)    Opiod Partial Agonists/Opioid Antagonists: Buprenorphine (Subocone, Belbuca, Butrans, Probuphine Implant, Sublocade), Naltrexone (ReVia, Vivitrol), Naloxone    Amphetamines: Dextroamphetamine/Amphetamine (Adderall, Mydayis), Methylphenidate Hydrochloride (Concerta, Metadate, Methylin, Ritalin)    The following medications should be stopped 5 days prior to surgery:  Blood Thinners: Any Aspirin, Aspirin products, anti-inflammatories such as ibuprofen and any blood thinners such as Coumadin and Plavix. Please consult your prescribing physician if you are on life saving blood thinners, in regards to when to stop medications prior to surgery.     The following medications should be stopped a minimum of 3 days prior to surgery:  PDE-5 inhibitors: Sildenafil (Viagra), Tadalafil (Cialis),  Vardenafil (Levitra), Avanafil (Stendra)    MAO Inhibitors: Rasagiline (Azilect), Selegiline (Eldepryl, Emsam, Selapar), Isocarboxazid (Marplan), Phenelzine (Nardil)             COVID and INFLUENZA NOTICE TO PATIENTS    Currently, the [facility] does not routinely test patients for COVID-19 or Influenza prior to their elective surgery.  However, if patients develop the following symptoms prior to their surgery date, they should voluntarily test for COVID-19 and Influenza and notify the surgical office of their condition and results.  The symptoms warranting testing would be any two of the following:    Fever (Temp above 100.4 F)  Chills  Cough  Shortness of breath or difficulty breathing  Fatigue  Myalgias (muscle or body aches)  Headache  Sore Throat  Congestion or Runny Nose  Nausea or vomiting  Diarrhea  New loss of taste or smell    Having these symptoms prior to surgery can significantly increase your risk of morbidity and mortality under anesthesia, which may compromise your health and require a transfer to a hospital for a higher level of care.  Therefore, it is advisable to notify the surgical team of any illness in order to get information for the appropriate time to delay the surgery to minimize these preventable risks.    Your health and safety are our number one priority at the [facility], and we are thankful that you entrust us with your care.  Please help us ensure the best possible surgical and anesthetic outcome by sharing appropriate health information with our perioperative team and staff.  We look forward to taking great care of you!    Thank you for your time and consideration on this matter.    Binu Marino MD  Medical Director  Anesthesiologist  [facility]

## 2023-12-06 NOTE — ASSESSMENT & PLAN NOTE
Last echo from October 20, 2023 with EF 55%, grade 1 diastolic dysfunction with moderate AAS/AI and RVSP of 45 mmHg --> now in biventricular failure as seen on POCUS per Dr. Gonda 12/9    - Repeat echo 12/9 with EF 45% and severe AS  Cardiology has been following him   I/Os daily, cardiac diet

## 2023-12-06 NOTE — ANESTHESIA PREPROCEDURE EVALUATION
Case: [id number] Date/Time: 12/06/23 1345    Procedures:       RIGHT SUBTALAR DISLOCATION OPEN REDUCTION INTERNAL FIXATION, TALONAVICULAR OPEN REDUCTION INTERNAL FIXATION, ACHILLES TENDON LENGTHENING, TRIPLE ARTHRODESIS, OSTEOTOMIES AS INDICATED, RIGHT LOWER EXTREMITY APPLICATION MULTIPLANAR EXTERNAL FIXATOR      REPAIR, TENDON, ACHILLES      FUSION AND ARTHROEREISIS, JOINT, FOOT AND ANKLE      APPLICATION, EXTERNAL FIXATION DEVICE      OSTEOTOMY    Diagnosis:       Right foot pain [M79.671]      Acute right ankle pain [M25.571]    Pre-op diagnosis: Right foot pain [M79.671], Acute right ankle pain [M25.571]    Location: [place] [address] / SURGERY [facility]    Surgeons: Calvin Pryor M.D.          Past Medical History:   Diagnosis Date    ADD (attention deficit disorder)     Alcohol abuse     Allergic rhinitis 05/21/2009    Anemia 09/2019    Anxiety 05/21/2009    Apnea, sleep     Arrhythmia     afib when hospitaized for infection    Back pain 05/21/2009    Bipolar disorder ([facility])     Bleeding ulcer 05/21/2009    Bleeding ulcer 05/21/2009    History of anemia-now on nexium     Bowel habit changes     constipation related to narcotics    Breath shortness     Congenital anomaly of mitral valve     Patient reports he only has 3 heart valves    Congestive heart failure (HCC)     Daytime sleepiness     Dental disorder     Missing teeth    Depression 05/21/2009    Eczema 05/21/2009    GERD (gastroesophageal reflux disease)     Heart burn     on meds    Heart murmur     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    Hemorrhagic disorder (HCC)     hx of bleeding ulcers    History of bleeding ulcers 02/12/2015    Hypertension 05/03/2021 12/7/23:  Patient states he has Never had high blood pressure. pt states well controlled on meds    Insomnia 05/21/2009    Intestinal hernia     Migraine 05/21/2009    Morning headache     Obesity, morbid (HCC) 05/21/2009    Osteomyelitis ([facility]) 10/07/2019    Pain 09/2019    Chronic knee and back  pain    Pneumonia     12/4/23:  Last time was around 1 year ago.    Pubic ramus fracture (HCC) 09/28/2019    Rheumatoid arteritis (HCC) 09/2019    knee, feet right hand    Sleep apnea 05/21/2009    Did not tolerate cpap, does not use it12/4/23:  Resolved with weight loss.    Snoring      Recent CHF exacerbation 10/2023, worked up at [facility].  Sat high 80s to low 90s on RA upon admission today. Says no new CP/SOB and has been taking lasix as scheduled.  Lungs have decreased sounds but clear with no ronchi.  Labs normal on 12/4/23  Known bicuspid AV and echo reviewed  Chronic opioid use and patient aware that pain control postoperatively could be a significant challenge    Past Surgical History:   Procedure Laterality Date    INCISION AND DRAINAGE ORTHOPEDIC Left 10/22/2023    Procedure: REVISION, BELOW KNEE AMPUTATION;  Surgeon: River Colindres M.D.;  Location: [facility];  Service: Orthopedics    IRRIGATION & DEBRIDEMENT GENERAL Left 10/19/2023    Procedure: IRRIGATION AND DEBRIDEMENT, WOUND;  Surgeon: Jay Roe M.D.;  Location: [facility];  Service: Trauma    KNEE AMPUTATION BELOW Left 03/09/2023    Procedure: AMPUTATION, BELOW KNEE;  Surgeon: Wayne Chambers M.D.;  Location: [facility];  Service: Orthopedics    PB TOTAL KNEE ARTHROPLASTY Right 07/22/2020    Procedure: ARTHROPLASTY, KNEE, TOTAL;  Surgeon: Temo Pires M.D.;  Location: [facility];  Service: Orthopedics    TENDON TRANSFER Right 01/22/2020    Procedure: RIGHT DISTAL ULNA RESECTION AND EXTENSOR TENDON TRANSFER;  Surgeon: Marine Rushing M.D.;  Location: [facility];  Service: Orthopedics    OTHER ORTHOPEDIC SURGERY Right 2020    total right knee replacement    IRRIGATION & DEBRIDEMENT ORTHO Left 10/09/2019    Procedure: IRRIGATION AND DEBRIDEMENT, WOUND - PELVIC OSTEOMYELITIS;  Surgeon: You Olivares M.D.;  Location: [facility];  Service: Orthopedics    OTHER  SURGICAL PROCEDURE  2019    osteomelitis of pelvis cleaned    GASTRIC BYPASS LAPAROSCOPIC  2000    Lucila en y    CHOLECYSTECTOMY  2000    OTHER SURGICAL PROCEDURE      repair of broken penis.  12/4/23:  Patient denies any surgery on penis.     Current Outpatient Medications   Medication Instructions    Acetaminophen Extra Strength 1,000 mg, Oral, 3 times daily    Adalimumab (HUMIRA PEN) 40 MG/0.4ML Pen-injector Kit INJECT 40MG SUBCUTANEOUSLY  EVERY 2 WEEKS    albuterol 108 (90 Base) MCG/ACT Aero Soln inhalation aerosol 2 Puffs, Inhalation, EVERY 6 HOURS PRN    amoxicillin-clavulanate (AUGMENTIN) 875-125 MG Tab 1 Tablet, Oral, 2 TIMES DAILY    D3-1000 1,000 Units, Oral, DAILY    dapagliflozin propanediol (FARXIGA) 10 mg, Oral, DAILY    DULoxetine (CYMBALTA) 60 mg, Oral, DAILY    fluconazole (DIFLUCAN) 100 mg, Oral, DAILY    furosemide (LASIX) 20 mg, Oral, 2 TIMES DAILY PRN, Take 1 tablet daily and may take additional 1 tablet as need for increase in weight by 3 lb/ water retention.    hydrOXYzine pamoate (VISTARIL) 25 mg, Oral, 3 TIMES DAILY PRN    methylPREDNISolone (MEDROL) 4 mg, Oral, 3 TIMES DAILY    morphine (MS IR) 30 mg, Oral, 4 TIMES DAILY    MORPHINE SULFATE ER PO 15 mg, Oral, 2 Times Daily (BID)    Movantik 25 mg, Oral, Every Day (QD)    multivitamin Tab 1 Tablet, Oral, DAILY    Naloxone (NARCAN) 4 MG/0.1ML Liquid One spray in one nostril for overdose and call 911.    omeprazole (PRILOSEC) 20 mg, Oral, DAILY     TTE 10/23/2023:  CONCLUSIONS  Prior study on 8/12/2023, compared to the report of the prior study,   there has been no significant change.   Normal left ventricular systolic function.  The left ventricular ejection fraction is visually estimated to be 55%.  Mild mitral regurgitation.  Heavily calcified aortic valve, possibly a bicuspid.  Moderate aortic valve stenosis Vmax is 3.57 m/s.  Aortic valve area calculated from the continuity equation is 1.2 cm2.  Vmax is 3.57 m/s. Transvalvular gradients  are - Peak: 50 mmHg,  Mean:   28 mmHg.   Moderate aortic insufficiency.  Mild tricuspid regurgitation.  Estimated right ventricular systolic pressure is 45 mmHg.    Relevant Problems   ANESTHESIA   (positive) MONICA (obstructive sleep apnea)      CARDIAC   (positive) Aortic root dilatation (HCC)   (positive) Aortic stenosis, moderate   (positive) Left bundle branch block   (positive) Murmur, cardiac         (positive) KATERYNA (acute kidney injury) ([facility])   (positive) Cardiorenal syndrome      Other   (positive) Acute right ankle pain   (positive) Rheumatoid arthritis ([facility])   (positive) Tenosynovitis of left lower leg   (positive) Umbilical hernia without obstruction and without gangrene       Physical Exam    Airway   Mallampati: II  TM distance: >3 FB  Neck ROM: full       Cardiovascular - normal exam  Rhythm: regular  Rate: normal  (+) murmur     Dental         Very poor dentition     Pulmonary - normal exam  Breath sounds clear to auscultation  (+) decreased breath sounds     Abdominal    Neurological - normal exam                   Anesthesia Plan    ASA 3 (Bicuspid aortic valve with moderate aortic stenosis)       Plan - general and peripheral nerve block     Peripheral nerve block will be post-op pain control  Airway plan will be ETT          Induction: intravenous    Postoperative Plan: Postoperative administration of opioids is intended.    Pertinent diagnostic labs and testing reviewed    Informed Consent:    Anesthetic plan and risks discussed with patient.    Use of blood products discussed with: patient whom consented to blood products.       Peripheral nerve block procedure and risks discussed with patient in detail. Risks discussed include, but are not limited to, failed block, prolonged numbness, pain, infection, bleeding, local anesthetic toxicity, and/or nerve damage. All questions answered and patient consents to proceed as planned and discussed.    Anesthetic procedure and risks discussed with patient  in detail.  Risks include but are not limited to PONV, pain, sore throat, damage to teeth/lips/gums, positioning injury, allergic reaction, vocal cord injury, prolonged intubation, and/or cardiopulmonary problems up to and including death.  Patient indicates complete understanding. All patient/family questions were answered to full satisfaction and they agree to proceed as above planned.

## 2023-12-06 NOTE — ASSESSMENT & PLAN NOTE
He underwent open reduction of right subtalar joint dislocation, right talonavicular joint dislocation right foot triple arthrodesis, right extensor tendon lengthening and application of multiplanar external fixator right lower extremity on 12/6.    Per orthopedic surgery, patient will be nonweightbearing to toe-touch weightbearing for transfer on the right lower extremity.  Will obtain PT/OT, continue DVT prophylaxis and multimodal pain management.

## 2023-12-06 NOTE — H&P
Surgery Orthopedic History & Physical Note    Date  12/6/2023    Primary Care Physician  Johnathan Dover M.D.    CC  Pre-Op Diagnosis Codes:     * Right foot pain [M79.671]     * Acute right ankle pain [M25.571]    HPI  This is a 48 y.o. male who presented with right lower extremity progressive flatfoot deformity, superficial wound, no evidence of osteomyelitis.  Here for surgical intervention    Past Medical History:   Diagnosis Date    ADD (attention deficit disorder)     Alcohol abuse     Allergic rhinitis 05/21/2009    Anemia 09/2019    Anxiety 05/21/2009    Apnea, sleep     Arrhythmia     afib when hospitaized for infection    Back pain 05/21/2009    Bipolar disorder ([facility])     Bleeding ulcer 05/21/2009    Bleeding ulcer 05/21/2009    History of anemia-now on nexium     Bowel habit changes     constipation related to narcotics    Breath shortness     Congenital anomaly of mitral valve     Patient reports he only has 3 heart valves    Congestive heart failure ([facility])     Daytime sleepiness     Dental disorder     Missing teeth    Depression 05/21/2009    Eczema 05/21/2009    GERD (gastroesophageal reflux disease)     Heart burn     on meds    Heart murmur     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    Hemorrhagic disorder (HCC)     hx of bleeding ulcers    History of bleeding ulcers 02/12/2015    Hypertension 05/03/2021 12/7/23:  Patient states he has Never had high blood pressure. pt states well controlled on meds    Insomnia 05/21/2009    Intestinal hernia     Migraine 05/21/2009    Morning headache     Obesity, morbid ([facility]) 05/21/2009    Osteomyelitis ([facility]) 10/07/2019    Pain 09/2019    Chronic knee and back pain    Pneumonia     12/4/23:  Last time was around 1 year ago.    Pubic ramus fracture ([facility]) 09/28/2019    Rheumatoid arteritis ([facility]) 09/2019    knee, feet right hand    Sleep apnea 05/21/2009    Did not tolerate cpap, does not use it12/4/23:  Resolved with weight loss.    Snoring        Past Surgical  History:   Procedure Laterality Date    INCISION AND DRAINAGE ORTHOPEDIC Left 10/22/2023    Procedure: REVISION, BELOW KNEE AMPUTATION;  Surgeon: River Colindres M.D.;  Location: SURGERY [facility];  Service: Orthopedics    IRRIGATION & DEBRIDEMENT GENERAL Left 10/19/2023    Procedure: IRRIGATION AND DEBRIDEMENT, WOUND;  Surgeon: Jay Roe M.D.;  Location: SURGERY [facility];  Service: Trauma    KNEE AMPUTATION BELOW Left 03/09/2023    Procedure: AMPUTATION, BELOW KNEE;  Surgeon: Wayne Chambers M.D.;  Location: SURGERY [facility];  Service: Orthopedics    PB TOTAL KNEE ARTHROPLASTY Right 07/22/2020    Procedure: ARTHROPLASTY, KNEE, TOTAL;  Surgeon: Temo Pires M.D.;  Location: [facility];  Service: Orthopedics    TENDON TRANSFER Right 01/22/2020    Procedure: RIGHT DISTAL ULNA RESECTION AND EXTENSOR TENDON TRANSFER;  Surgeon: Marine Rushing M.D.;  Location: [facility];  Service: Orthopedics    OTHER ORTHOPEDIC SURGERY Right 2020    total right knee replacement    IRRIGATION & DEBRIDEMENT ORTHO Left 10/09/2019    Procedure: IRRIGATION AND DEBRIDEMENT, WOUND - PELVIC OSTEOMYELITIS;  Surgeon: You Olivares M.D.;  Location: [facility];  Service: Orthopedics    OTHER SURGICAL PROCEDURE  2019    osteomelitis of pelvis cleaned    GASTRIC BYPASS LAPAROSCOPIC  2000    Lucila en y    CHOLECYSTECTOMY  2000    OTHER SURGICAL PROCEDURE      repair of broken penis.  12/4/23:  Patient denies any surgery on penis.       Current Facility-Administered Medications   Medication Dose Route Frequency Provider Last Rate Last Admin    ceFAZolin (Ancef) injection 2 g  2 g Intravenous Once Calvin Pryor M.D.        lidocaine (Xylocaine) 1 % injection 0.5 mL  0.5 mL Intradermal Once PRN Calvin Pryor M.D.        lactated ringers infusion   Intravenous Continuous Calvin Pryor M.D.        oxyCODONE CR (OxyCONTIN) tablet 20 mg  20 mg Oral Once  Walt Razo M.D.        acetaminophen (Tylenol) tablet 1,000 mg  1,000 mg Oral Once Walt Razo M.D.           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  23:  Patient declines smoking cessation information at this time.    Vaping Use    Vaping Use: Never used   Substance and Sexual Activity    Alcohol use: No     Alcohol/week: 0.0 oz     Comment: quit  ago- past ETOH abuse    Drug use: Never    Sexual activity: Yes     Partners: Female     Comment: ;    Other Topics Concern     Service No    Blood Transfusions Yes    Caffeine Concern No    Occupational Exposure No    Hobby Hazards No    Sleep Concern No    Stress Concern Yes    Weight Concern Yes    Special Diet No    Back Care No    Exercise No    Bike Helmet No    Seat Belt Yes    Self-Exams Yes   Social History Narrative    , lives in [place]     Social Determinants of Health     Financial Resource Strain: Low Risk  (2023)    Overall Financial Resource Strain (CARDIA)     Difficulty of Paying Living Expenses: Not very hard   Food Insecurity: No Food Insecurity (2023)    Hunger Vital Sign     Worried About Running Out of Food in the Last Year: Never true     Ran Out of Food in the Last Year: Never true   Transportation Needs: Unmet Transportation Needs (10/30/2023)    PRAPARE - Transportation     Lack of Transportation (Medical): Yes     Lack of Transportation (Non-Medical): Yes   Physical Activity: Sufficiently  Active (9/7/2023)    Exercise Vital Sign     Days of Exercise per Week: 7 days     Minutes of Exercise per Session: 30 min   Stress: Stress Concern Present (9/7/2023)    [employer] - Occupational Stress Questionnaire     Feeling of Stress : Rather much   Social Connections: Socially Integrated (9/7/2023)    Social Connection and Isolation Panel [NHANES]     Frequency of Communication with Friends and Family: More than three times a week     Frequency of Social Gatherings with Friends and Family: Twice a week     Attends Congregational Services: More than 4 times per year     Active Member of Clubs or Organizations: Yes     Attends Club or Organization Meetings: More than 4 times per year     Marital Status:    Intimate Partner Violence: Not At Risk (9/7/2023)    Humiliation, Afraid, Rape, and Kick questionnaire     Fear of Current or Ex-Partner: No     Emotionally Abused: No     Physically Abused: No     Sexually Abused: No   Housing Stability: Low Risk  (9/7/2023)    Housing Stability Vital Sign     Unable to Pay for Housing in the Last Year: No     Number of Places Lived in the Last Year: 1     Unstable Housing in the Last Year: No       Family History   Problem Relation Age of Onset    Hypertension Mother     Arthritis Mother     Depression Mother     Cancer Father         bladder    Schizophrenia Father     Cancer Maternal Grandmother         breast    Heart Disease Maternal Grandmother     Psychiatric Illness Other     Stroke Maternal Aunt     Other Neg Hx         no connective tissue disorders or known aortic disease       Allergies  Nsaids, Diphenhydramine, Mirtazapine, Penicillins, and Promethazine    Review of Systems  Negative    Physical Exam    Vital Signs  Blood Pressure: 114/68   Temperature: 36.8 °C (98.2 °F)   Pulse: (!) 104   Respiration: (!) 22 (RN notified.)   Pulse Oximetry: 97 %     Exam:  Cardiovascular: Well perfused, DP/PT pulses intact, cap refill < 2  secs.  General: Well-developed, well nourished male in no apparent distress.  Psych: Normal mood and affect.  Eyes: Eyes demonstrate normal movement.  There is no evidence of jaundice or scleral icterus.  HEENT: Normocephalic, atraumatic.  NECK: trachea midline.  PULMONARY: Breathing comfortably on room air, without labored breathing.  CARDIAC: regular rate  Right UE:  strength is intact.  MP joint flexion demonstrates no pain.  Full MP joint extension is noted.  Musculoskeletal  Right lower extremity severe planovalgus deformity, incompletely correctable to neutral, subluxation/dislocation of the talonavicular and subtalar joints.  Palpable pulses.  Superficial wound in the plantar medial aspect of the foot  Labs:  Recent Labs     12/04/23  1400   WBC 8.7   RBC 3.45*   HEMOGLOBIN 9.5*   HEMATOCRIT 30.5*   MCV 88.4   MCH 27.5   MCHC 31.1*   RDW 50.8*   PLATELETCT 269   MPV 10.6     Recent Labs     12/04/23  1400   SODIUM 138   POTASSIUM 4.1   CHLORIDE 102   CO2 25   GLUCOSE 77   BUN 9   CREATININE 0.60   CALCIUM 8.5         Recent Labs     12/04/23  1400   ASTSGOT 9*   ALTSGPT <5   TBILIRUBIN 0.4   ALKPHOSPHAT 117*   GLOBULIN 3.3       Radiology:  DX-PORTABLE FLUORO > 1 HOUR    (Results Pending)   DX-FOOT-COMPLETE 3+ RIGHT    (Results Pending)         Assessment/Plan:  Pre-Op Diagnosis Codes:     * Right foot pain [M79.671]     * Acute right ankle pain [M25.571]  Procedure(s):  RIGHT SUBTALAR DISLOCATION OPEN REDUCTION INTERNAL FIXATION, TALONAVICULAR OPEN REDUCTION INTERNAL FIXATION, ACHILLES TENDON LENGTHENING, TRIPLE ARTHRODESIS, OSTEOTOMIES AS INDICATED, RIGHT LOWER EXTREMITY APPLICATION MULTIPLANAR EXTERNAL FIXATOR  REPAIR, TENDON, ACHILLES  FUSION AND ARTHROEREISIS, JOINT, FOOT AND ANKLE  APPLICATION, EXTERNAL FIXATION DEVICE  OSTEOTOMY

## 2023-12-06 NOTE — LETTER
October 17, 2023    Patient Name: Richard Hubbard II  Surgeon Name: Calvin Pryor M.D.  Surgery Facility: [facility] ([address] [facility])  Surgery Date: 10/25/2023    The time of your surgery is not final and may change up to and until the day of your surgery. You will be contacted 24-48 hours prior to your surgery date with your check-in and surgery time.    If you will not be at one of the below numbers please call the surgery scheduler at [phone]  Preferred Phone: [phone]    BEFORE YOUR SURGERY   Pre Registration and/or Lab Work must be done within and no earlier than 28 days prior to your surgery date. Your scheduled facility will contact you regarding all required preregistration and/or lab work. If you have not been contacted within 7 days of your scheduled procedure please call [facility] at [phone] for an appointment as soon as possible.    The [facility] offers a class for patients undergoing a total hip/knee replacement surgery scheduled at our outpatient surgery center. Information about what to expect for preparation, the day of surgery and recovery will be given. We highly recommend bringing your support for surgery with you to the class, as well as any questions you may have. If you are interested in attending the class, please call [phone] for scheduling.     Pre op Appointment:  Instructions: Bring a list of all medications you are taking including the dosing and frequency.    DAY OF YOUR SURGERY  Nothing to eat or drink after midnight     Refrain from smoking any substance after midnight prior to surgery. Smoking may interfere with the anesthetic and frequently produces nausea during the recovery period.    Continue taking all lifesaving medications. Including the morning of your surgery with small sip of water.    Please do NOT take on the day of surgery:  Diuretics: examples- furosemide (Lasix),  spironolactone, hydrochlorothiazide  ACE-inhibitors: examples- lisinopril, ramipril, enalapril  “ARBs”: examples- losartan, Olmesartan, valsartan    Please arrive at the hospital/surgery center at the check-in time provided.     An adult will need to bring you and take you home after your surgery.     AFTER YOUR SURGERY  Post op Appointment:   Date: 11/07/23   Time: 02:30PM   With: Calvin Pryor MD   Location: [address] [place] [id number]    TIME OFF WORK  FMLA or Disability forms can be faxed directly to: [phone] or you may drop them off at [address] [facility] [place] [address]. Our office charges a $35.00 fee per form. Forms will be completed within 10 business days of drop off and payment received. For the status of your forms you may contact our disability office directly [phone].    MEDICATION INSTRUCTIONS **Please read section completely**    The following medications should be stopped a minimum of 10 days prior to surgery:  All over the counter, Supplements & Herbal medications    Anorectics: Phentermine (Adipex-P, Lomaira and Suprenza), Phentermine-topiramate (Qsymia), Bupropion-naltrexone (Contrave)    Opiod Partial Agonists/Opioid Antagonists: Buprenorphine (Subocone, Belbuca, Butrans, Probuphine Implant, Sublocade), Naltrexone (ReVia, Vivitrol), Naloxone    Amphetamines: Dextroamphetamine/Amphetamine (Adderall, Mydayis), Methylphenidate Hydrochloride (Concerta, Metadate, Methylin, Ritalin)    The following medications should be stopped 5 days prior to surgery:  Blood Thinners: Any Aspirin, Aspirin products, anti-inflammatories such as ibuprofen and any blood thinners such as Coumadin and Plavix. Please consult your prescribing physician if you are on life saving blood thinners, in regards to when to stop medications prior to surgery.     The following medications should be stopped a minimum of 3 days prior to surgery:  PDE-5 inhibitors: Sildenafil (Viagra), Tadalafil (Cialis),  Vardenafil (Levitra), Avanafil (Stendra)    MAO Inhibitors: Rasagiline (Azilect), Selegiline (Eldepryl, Emsam, Selapar), Isocarboxazid (Marplan), Phenelzine (Nardil)             COVID and INFLUENZA NOTICE TO PATIENTS    Currently, the [facility] does not routinely test patients for COVID-19 or Influenza prior to their elective surgery.  However, if patients develop the following symptoms prior to their surgery date, they should voluntarily test for COVID-19 and Influenza and notify the surgical office of their condition and results.  The symptoms warranting testing would be any two of the following:    Fever (Temp above 100.4 F)  Chills  Cough  Shortness of breath or difficulty breathing  Fatigue  Myalgias (muscle or body aches)  Headache  Sore Throat  Congestion or Runny Nose  Nausea or vomiting  Diarrhea  New loss of taste or smell    Having these symptoms prior to surgery can significantly increase your risk of morbidity and mortality under anesthesia, which may compromise your health and require a transfer to a hospital for a higher level of care.  Therefore, it is advisable to notify the surgical team of any illness in order to get information for the appropriate time to delay the surgery to minimize these preventable risks.    Your health and safety are our number one priority at the [facility], and we are thankful that you entrust us with your care.  Please help us ensure the best possible surgical and anesthetic outcome by sharing appropriate health information with our perioperative team and staff.  We look forward to taking great care of you!    Thank you for your time and consideration on this matter.    Binu Marino MD  Medical Director  Anesthesiologist  [facility]

## 2023-12-07 PROBLEM — D84.9 IMMUNOSUPPRESSION (HCC): Status: ACTIVE | Noted: 2023-01-01

## 2023-12-07 NOTE — DISCHARGE PLANNING
HTH/SCP TCN chart review completed. The most current review of medical record, knowledge of pt's PLOF and social support, LACE+ score of 58, 6 clicks scores of 47 mobility were considered.      Pt seen at bedside. Introduced TCN program. Provided education regarding post acute levels of care. Discussed HTH/SCP plan benefits (Meds to Beds, medical uber and [facility] transitional care). Pt verbalizes understanding.     Patient reports he is agreeable to IRF, reports he would be okay with [facility] as backup choice.  Noted awaiting PT/OT recommendations however patient anticipates he will need post acute placement, which is reasonable based on procedure and PLOF per chart review.      Choice proactively obtained for IRF and SNF and faxed to [facility]. TCN will continue to follow and collaborate with discharge planning team as additional post acute needs arise. Thank you.     Completed today:  Awaiting PT/OT recommendations   Choice obtained: IRF (1), [facility] (2)  SCP with [facility] PCP. Anticipate post acute placement, however noted patient has f/u scheduled 12/19 with multispeciality care.

## 2023-12-07 NOTE — ASSESSMENT & PLAN NOTE
Moderate on last echo in October 2023  - Repeat echo revealing severe AS  Cardiology has been following and  planning for TAVR

## 2023-12-07 NOTE — ASSESSMENT & PLAN NOTE
With infected stump  -- Orthopedic surgery was consulted, patient underwent reimplantation of the left BKA wound.  Intraoperative culture growing Serratia  ID consulted, will need IV antibiotics for 6 weeks; last day being 1/23/2024

## 2023-12-07 NOTE — OR NURSING
"1800 Pt arrived from OR via gurney. Report given by anesthesia and RN. 98.3f diaphoretic, . Sleeping perrla. Opa, 10L mask even non labored breathing. Lungs clear airway patent. SR, paroxymal runs of wide complex qrs. EKG ordered per MD Razo at beside. Skin pink warm. Piv patent. RLE elevated, dressing cdi no drainage. Ex fix in place.     1808 EKG at bedside     1920 MD Razo at beside. Given EKG. No ectopy noted currently.     1827 updated Katiana, spouse, via text     1831 MD Razo at bedside, checking nasopharyngeal temp. Pt sleeping. Even non labored breathing.     1851 arousable. Gag reflex intact. Opa removed. Spontaneous even non labored breathing. States name clearly, makes eye contact, moves x4 extremities. Falls back to sleep.     1956, 1916 updated Katiana, spouse, via text     1917 continues to sleep. Easily arouses and states name then falls back to sleep. RLE dressing cdi no drainage.     1945 MD Razo at bedside     2006 report given to Lauren CUEVAS, [id number].     2007 continues to sleep, easily arouses, states name and place then falls back to sleep.     2012 updated katiana, via text    2015 awake, sitting up drinking water. A&ox4. Clear speech, follows commands. States \"Im good.\" DENIES pain and nausea. Even on labored breathing. Ready for transfer to room  "

## 2023-12-07 NOTE — PROGRESS NOTES
[facility] Medicine Daily Progress Note    Date of Service  12/7/2023    Chief Complaint  Richard Hubbard II is a 48 y.o. male admitted 12/6/2023 with No chief complaint on file.        Hospital Course  Patient is a 48-year-old male with a past medical history significant for moderate aortic stenosis, bicuspid aortic valve, mildly to moderate pulmonary hypertension with RSVP of 50, history of gastric bypass, history of rheumatoid arthritis, left BKA stump abscess/osteomyelitis complicated with MSSA bacteremia on 10/23.  Patient was supposed to be on Ancef daily 12/1 and continue Augmentin 875/125 1 tablet p.o. twice daily for 8 weeks.    He was admitted on 12/6 by orthopedic surgery and he was noted to have progressive flatfoot on the right side.  He underwent open reduction of right subtalar joint dislocation, right talonavicular joint dislocation right foot triple arthrodesis, right extensor tendon lengthening and application of multiplanar external fixator right lower extremity on 12/6.    Per orthopedic surgery, patient will be nonweightbearing to toe-touch weightbearing for transfer on the right lower extremity.  Will obtain PT/OT, continue DVT prophylaxis and multimodal pain management.    Interval events:  -- No acute events overnight, medicine has been stable, patient is currently requiring 2 L of oxygen  -- Unfortunately we do not have any blood work at the time of dictation, will obtain blood work  -- It is noted that patient have congestive heart failure and was started on Lasix, will monitor daily weight I/os, cardiac diet.  -- Will obtain PT/OT, DVT prophylaxis, multimodal pain management  -- Orthopedic surgery following, will follow the recommendation        I have discussed this patient's plan of care and discharge plan at IDT rounds today with Case Management, Nursing, Nursing leadership, and other members of the IDT team.    Consultants/Specialty  orthopedics    Code Status  Full  Code    Disposition  The patient is not medically cleared for discharge to home or a post-acute facility.  Anticipate discharge to: an inpatient rehabilitation hospital    I have placed the appropriate orders for post-discharge needs.    Review of Systems  Review of Systems   Constitutional:  Positive for malaise/fatigue.   Eyes:  Negative for blurred vision and double vision.   Musculoskeletal:  Positive for joint pain and myalgias.   Neurological:  Negative for headaches.   Psychiatric/Behavioral:  The patient is nervous/anxious.         Physical Exam  Temp:  [35.9 °C (96.6 °F)-37.6 °C (99.7 °F)] 36.4 °C (97.5 °F)  Pulse:  [] 95  Resp:  [14-22] 17  BP: ()/(52-70) 123/67  SpO2:  [92 %-100 %] 95 %    Physical Exam  Vitals and nursing note reviewed.   Constitutional:       Appearance: Normal appearance.   HENT:      Head: Normocephalic and atraumatic.   Eyes:      Extraocular Movements: Extraocular movements intact.      Pupils: Pupils are equal, round, and reactive to light.   Cardiovascular:      Rate and Rhythm: Normal rate.   Pulmonary:      Effort: No respiratory distress.      Comments: Decreased breath sounds at the bases  Musculoskeletal:      Cervical back: Neck supple.   Neurological:      Mental Status: He is alert and oriented to person, place, and time.   Psychiatric:         Mood and Affect: Mood normal.         Fluids    Intake/Output Summary (Last 24 hours) at 12/7/2023 0913  Last data filed at 12/7/2023 0700  Gross per 24 hour   Intake 560 ml   Output 750 ml   Net -190 ml       Laboratory  Recent Labs     12/04/23  1400 12/07/23  0804   WBC 8.7 10.3   RBC 3.45* 3.22*   HEMOGLOBIN 9.5* 8.9*   HEMATOCRIT 30.5* 29.0*   MCV 88.4 90.1   MCH 27.5 27.6   MCHC 31.1* 30.7*   RDW 50.8* 52.4*   PLATELETCT 269 236   MPV 10.6 10.2     Recent Labs     12/04/23  1400   SODIUM 138   POTASSIUM 4.1   CHLORIDE 102   CO2 25   GLUCOSE 77   BUN 9   CREATININE 0.60   CALCIUM 8.5                    Imaging  DX-PORTABLE FLUORO > 1 HOUR   Preliminary Result      Portable fluoroscopy utilized for 1 minute 48 seconds.         INTERPRETING LOCATION: [address], [place], [address]      DX-FOOT-COMPLETE 3+ RIGHT   Preliminary Result      Digitized intraoperative radiograph is submitted for review. This examination is not for diagnostic purpose but for guidance during a surgical procedure. Please see the patient's chart for full procedural details.         INTERPRETING LOCATION: [address], [place], [address]           Assessment/Plan  * Dislocation of right subtalar joint  Assessment & Plan  He underwent open reduction of right subtalar joint dislocation, right talonavicular joint dislocation right foot triple arthrodesis, right extensor tendon lengthening and application of multiplanar external fixator right lower extremity on 12/6.    Per orthopedic surgery, patient will be nonweightbearing to toe-touch weightbearing for transfer on the right lower extremity.  Will obtain PT/OT, continue DVT prophylaxis and multimodal pain management.    Immunosuppression (HCC)  Assessment & Plan  Chronic considering patient being on Humira for rheumatoid arthritis    Acute diastolic heart failure (HCC)- (present on admission)  Assessment & Plan  Continue lasix  I/Os , daily weight, fluid restriction to 1.5 L, cardiac diet    S/P BKA (below knee amputation) unilateral, left (HCC)- (present on admission)  Assessment & Plan  History of   --patient stated leaking from his wound, will obtain wound care consult  --Continue Augmentin        Chronic pain- (present on admission)  Assessment & Plan  Continue home meds      Nonrheumatic aortic valve stenosis- (present on admission)  Assessment & Plan  Hx of   Present has bicuspid aortic valve    Rheumatoid arthritis (HCC)- (present on admission)  Assessment & Plan  On humaria    , Patient is supposed to take on 12/30 he stated he did not take that medication         VTE prophylaxis:  lovenox

## 2023-12-07 NOTE — ANESTHESIA POSTPROCEDURE EVALUATION
Patient: Richard Hubbard II    Procedure Summary       Date: 12/06/23 Room / Location: [place] [address] / SURGERY [facility]    Anesthesia Start: 1417 Anesthesia Stop: 1810    Procedure: RIGHT SUBTALAR DISLOCATION OPEN REDUCTION INTERNAL FIXATION, TALONAVICULAR OPEN REDUCTION INTERNAL FIXATION, ACHILLES TENDON LENGTHENING, TRIPLE ARTHRODESIS, RIGHT LOWER EXTREMITY APPLICATION OF MULTIPLANAR EXTERNAL FIXATOR (Right: Foot) Diagnosis:       Right foot pain      Acute right ankle pain      (Right Foot Dislocations; Planovalgus)    Surgeons: Calvin Pryor M.D. Responsible Provider: Walt Razo M.D.    Anesthesia Type: general, peripheral nerve block ASA Status: 3            Final Anesthesia Type: general, peripheral nerve block  Last vitals  BP   Blood Pressure: 103/54    Temp   36.9 °C (98.4 °F)    Pulse   86   Resp   20    SpO2   98 %      Anesthesia Post Evaluation    Patient location during evaluation: PACU  Patient participation: complete - patient participated  Level of consciousness: sleepy but conscious  Pain score: 0    Airway patency: patent  Anesthetic complications: no  Cardiovascular status: hemodynamically stable  Respiratory status: acceptable  Hydration status: balanced  Comments: Stable throughout with SBP 80s-90s.  Levophed gtt used intra-op  Good block coverage of ex-fix  Ventilating well and ok to monitored bed    PONV: none          There were no known notable events for this encounter.     Nurse Pain Score: 0 (NPRS)

## 2023-12-07 NOTE — PROGRESS NOTES
4 Eyes Skin Assessment Completed by Dominga RN and FAITH Mejias.    Head WDL  Ears WDL  Nose WDL  Mouth WDL  Neck WDL  Breast/Chest WDL  Shoulder Blades WDL  Spine Bruising  (R) Arm/Elbow/Hand WDL  (L) Arm/Elbow/Hand WDL  Abdomen Redness under pannus, interdry in place  Groin Redness to right groin  Scrotum/Coccyx/Buttocks Redness and Blanching  (R) Leg ace wrap in place with external fixator, CDI. No orders to change dressing present  (L) Leg BKA with wound to incision site, wound consult already in place, seen by wound RN today, dressing in place  (R) Heel/Foot/Toe Leg ace wrap in place with external fixator, CDI. No orders to change dressing present  (L) Heel/Foot/Toe BKA          Devices In Places Pulse Ox and Nasal Cannula, external fixator       Interventions In Place NC W/Ear Foams, Sacral Mepilex, Waffle Overlay, TAP System, Q2 Turns, and Pressure Redistribution Mattress    Possible Skin Injury No    Pictures Uploaded Into Epic N/A  Wound Consult Placed N/A  RN Wound Prevention Protocol Ordered Yes

## 2023-12-07 NOTE — CARE PLAN
The patient is Stable - Low risk of patient condition declining or worsening    Shift Goals  Clinical Goals: Pain control, void  Patient Goals: Pain control    Progress made toward(s) clinical / shift goals:  Pain medication administered PRN, patient able to sleep intermittently. Patient able to void, no interventions needed    Patient is not progressing towards the following goals:

## 2023-12-07 NOTE — ANESTHESIA TIME REPORT
Anesthesia Start and Stop Event Times       Date Time Event    12/6/2023 1340 Ready for Procedure     1417 Anesthesia Start     1810 Anesthesia Stop          Responsible Staff  12/06/23      Name Role Begin End    Walt Razo M.D. Anesth 1417 1810          Overtime Reason:  no overtime (within assigned shift)    Comments: Nerve block x2: Adductor canal and sciatic blocks both with U/S

## 2023-12-07 NOTE — PROGRESS NOTES
Report received from PACU RN, assumed care upon arrival.  Assessment complete.  A&O x 4. Patient calls appropriately.  Patient has not ambulated since sx  Patient has 7/10 pain. Pain managed with prescribed medications.  Denies N&V.   Surgical incision with ex fix to RLE, dressing CD&I. L BKA  - flatus, - BM.  Patient denies SOB. On 4L nasal cannula.  Patient calm, pleasant, and cooperative.  Review plan with of care with patient. Call light and personal belongings within reach. Hourly rounding in place. All needs met at this time.

## 2023-12-07 NOTE — PROGRESS NOTES
4 Eyes Skin Assessment Completed by FAITH Aguilar and Naomi RN     Head WDL  Ears WDL  Nose WDL  Mouth WDL  Neck WDL  Breast/Chest WDL   Shoulder Blades WDL   Spine  WDL  (R) Arm/Elbow/Hand WDL  (L) Arm/Elbow/Hand WDL, PIV in place  Abdomen WDL  Pannus: pink, red, excoriation  Scrotum/Coccyx/Buttocks WDL  (R) Leg: surgical incision with ex fix, dressing in place  (L) Leg: BKA, yellow, white wound              Devices In Places: ex fix, , nasal cannula        Interventions In Place: interdry         Pictures Uploaded Into Epic yes  Wound Consult Placed yes

## 2023-12-07 NOTE — HOSPITAL COURSE
This is a 48-year-old male with a past medical history significant for moderate AS, bicuspid aortic valve, mildly to moderate pulmonary hypertension with RSVP of 50, history of gastric bypass, history of rheumatoid arthritis, left BKA stump abscess/osteomyelitis complicated with MSSA bacteremia on 10/23. Patient was supposed to be on Ancef daily 12/1 and continue Augmentin 875/125 1 tablet p.o. twice daily for 8 weeks per ID recs    He was admitted on 12/6 by orthopedic surgery and he was noted to have progressive flatfoot on the right side.  He underwent open reduction of right subtalar joint dislocation, right talonavicular joint dislocation right foot triple arthrodesis, right extensor tendon lengthening and application of multiplanar external fixator right lower extremity on 12/6.     Per orthopedic surgery, patient will be nonweightbearing to toe-touch weightbearing for transfer on the right lower extremity.  12/8, Response was called, found to be in A-fib with RVR.      Of note patient was noted to be in cardiogenic shock on pressures, echocardiogram showed EF 45%, severely AAS, moderate AI, moderate to severe TR, RSVP 66.  Cardiology was consulted, plan for TAVR in the next month.  Patient underwent cardiac cath on 12/20.  Cards recommend amiodarone initially, switched to dronabinol 2.5 mg p.o. twice daily on 12/20; along with heparin drip.    Of note patient has infected left BKA wound, he underwent amputation of left leg below the knee, patient is nonweightbearing on the left lower extremity.  Will need PT/OT, DVT prophylaxis Multimodal pain management.  Intraoperative culture growing Serratia marcescens.  Patient was started on ceftriaxone plus Zyvox since 12/9-12/13 and changed to Cefepime on 12/13.

## 2023-12-07 NOTE — WOUND TEAM
[facility]  Inpatient Services  Initial Wound and Skin Care Evaluation    Admission Date: 12/6/2023     Last order of IP CONSULT TO WOUND CARE was found on 10/27/2023 from Hospital Encounter on 10/26/2023     HPI, PMH, SH: Reviewed    Past Surgical History:   Procedure Laterality Date    ORIF, ANKLE Right 12/6/2023    Procedure: RIGHT SUBTALAR DISLOCATION OPEN REDUCTION INTERNAL FIXATION, TALONAVICULAR OPEN REDUCTION INTERNAL FIXATION, ACHILLES TENDON LENGTHENING, TRIPLE ARTHRODESIS, RIGHT LOWER EXTREMITY APPLICATION OF MULTIPLANAR EXTERNAL FIXATOR;  Surgeon: Calvin Pryor M.D.;  Location: [facility];  Service: Orthopedics    INCISION AND DRAINAGE ORTHOPEDIC Left 10/22/2023    Procedure: REVISION, BELOW KNEE AMPUTATION;  Surgeon: River Colindres M.D.;  Location: [facility];  Service: Orthopedics    IRRIGATION & DEBRIDEMENT GENERAL Left 10/19/2023    Procedure: IRRIGATION AND DEBRIDEMENT, WOUND;  Surgeon: Jay Roe M.D.;  Location: [facility];  Service: Trauma    KNEE AMPUTATION BELOW Left 03/09/2023    Procedure: AMPUTATION, BELOW KNEE;  Surgeon: Wayne Chambers M.D.;  Location: [facility];  Service: Orthopedics    PB TOTAL KNEE ARTHROPLASTY Right 07/22/2020    Procedure: ARTHROPLASTY, KNEE, TOTAL;  Surgeon: Temo Pires M.D.;  Location: [facility];  Service: Orthopedics    TENDON TRANSFER Right 01/22/2020    Procedure: RIGHT DISTAL ULNA RESECTION AND EXTENSOR TENDON TRANSFER;  Surgeon: Marine Rushing M.D.;  Location: [facility];  Service: Orthopedics    OTHER ORTHOPEDIC SURGERY Right 2020    total right knee replacement    IRRIGATION & DEBRIDEMENT ORTHO Left 10/09/2019    Procedure: IRRIGATION AND DEBRIDEMENT, WOUND - PELVIC OSTEOMYELITIS;  Surgeon: You Olivares M.D.;  Location: [facility];  Service: Orthopedics    OTHER SURGICAL PROCEDURE  2019    osteomelitis of pelvis cleaned     GASTRIC BYPASS LAPAROSCOPIC      Lcuila en y    CHOLECYSTECTOMY      OTHER SURGICAL PROCEDURE      repair of broken penis.  23:  Patient denies any surgery on penis.     Social History     Tobacco Use    Smoking status: Some Days     Current packs/day: 0.00     Types: Cigarettes, Cigars     Last attempt to quit: 2023     Years since quittin.8     Passive exposure: Current    Smokeless tobacco: Never    Tobacco comments:     occasional cigar.  23:  Patient declines smoking cessation information at this time.    Substance Use Topics    Alcohol use: No     Alcohol/week: 0.0 oz     Comment: quit  ago- past ETOH abuse     No chief complaint on file.    Diagnosis: Right foot pain [M79.671]  Acute right ankle pain [M25.571]  Dislocation of right subtalar joint, initial encounter [S93.314A]    Unit where seen by Wound Team: T4     WOUND CONSULT RELATED TO: L BKA site     WOUND TEAM PLAN OF CARE - Frequency of Follow-up:   Nursing to follow dressing orders written for wound care. Contact wound team if area fails to progress, deteriorates or with any questions/concerns if something comes up before next scheduled follow up (See below as to whether wound is following and frequency of wound follow up)   Weekly - L BKA     WOUND HISTORY:   Patient with diagnosis of pes planovalgus admitted  and had surgery with Dr. Pryor related to right foot:  Right subtalar joint dislocation  Right talonavicular dislocation   Right ankle equinus contracture  Severe right pes planovalgus  Rheumatoid arthritis  Patient underwent L BKA with Dr. Chambers in March related to foot wound.  In October he had 2 I&Ds with ortho related to abscess.  Patient had a wound vac.  Unclear why he does not have a VAC dressing now.         WOUND ASSESSMENT/LDA  Wound 23 Full Thickness Wound Leg Left chronic wound (Active)   Date First Assessed/Time First Assessed: 23   Present on Original Admission: Yes   Primary Wound Type: Full Thickness Wound  Location: Leg  Laterality: Left  Wound Description (Comments): chronic wound      Assessments 12/7/2023  1:00 PM   Wound Image       Site Assessment Pink;Red;Yellow;Tan   Periwound Assessment Edema   Margins Unattached edges   Closure Secondary intention   Drainage Amount Small   Drainage Description Yellow;Serosanguineous   Treatments Cleansed;Site care;Offloading   Wound Cleansing Normal Saline Irrigation   Periwound Protectant Skin Protectant Wipes to Periwound   Dressing Status Clean;Dry;Intact   Dressing Changed New   Dressing Cleansing/Solutions 1/4 Strength Dakin's Solution   Dressing Options Plain Strip Packing;Absorbent Abdominal Pad;Dry Roll Gauze   Dressing Change/Treatment Frequency Every Shift, and As Needed   NEXT Dressing Change/Treatment Date 12/07/23   NEXT Weekly Photo (Inpatient Only) 12/14/23   Wound Team Following Weekly   Non-staged Wound Description Full thickness   Wound Length (cm) 1.4 cm   Wound Width (cm) 1.9 cm   Wound Surface Area (cm^2) 2.66 cm^2   Undermining (cm) 3 cm   Undermining of Wound, 1st Location From 3 o'clock;To 9 o'clock   WOUND NURSE ONLY - Time Spent with Patient (mins) 60      Vascular:    SUSHMA:   No results found.    Lab Values:    Lab Results   Component Value Date/Time    WBC 10.3 12/07/2023 08:04 AM    WBC 8.4 10/19/2010 12:05 PM    RBC 3.22 (L) 12/07/2023 08:04 AM    RBC 4.96 10/19/2010 12:05 PM    HEMOGLOBIN 8.9 (L) 12/07/2023 08:04 AM    HEMATOCRIT 29.0 (L) 12/07/2023 08:04 AM    CREACTPROT 0.43 11/27/2023 02:30 PM    SEDRATEWES 40 (H) 11/27/2023 02:30 PM    HBA1C 4.7 10/28/2023 05:38 AM         Culture Results show:  No results found for this or any previous visit (from the past 720 hour(s)).    Pain Level/Medicated:  None, Tolerated without pain medication       INTERVENTIONS BY WOUND TEAM:  Chart and images reviewed. Discussed with bedside RN. All areas of concern (based on picture review, LDA review and discussion with  "bedside RN) have been thoroughly assessed. Documentation of areas based on significant findings. This RN in to assess patient. Performed standard wound care which includes appropriate positioning, dressing removal and non-selective debridement. Pictures and measurements obtained weekly if/when required.    Wound:  L BKA  Cleansed/Non-selectively Debrided with:  Normal Saline and Gauze  [place] wound: Cleansed with Normal Saline and Gauze, Prepped with No Sting  Primary Dressin/2\" plain strip packing to tunnel medially and posterior and wound bed.  Secondary (Outer) Dressing: ABD pad and roll gauze    Advanced Wound Care Discharge Planning  Number of Clinicians necessary to complete wound care: 1  Is patient requiring IV pain medications for dressing changes:  No   Length of time for dressing change 20 min. (This does not include chart review, pre-medication time, set up, clean up or time spent charting.)    Interdisciplinary consultation: Patient, Bedside RN (Amanda),  New RN shadow .  Pressure injury and staging reviewed with N/A.    EVALUATION / RATIONALE FOR TREATMENT:     Date:  23  Wound Status:  Initial evaluation    Wound with bone/periosteum exposed.  Updated Dr. Pryor who preformed R foot surgery.  Dr. Pryor agreed to have member of surgery team take a look at wound and determine if/when a revision would be appropriate.  For now, dakin's moistened packing to wound bed to encourage wicking of fluid and aid in removal of dead tissue.           Goals: Steady decrease in wound area and depth weekly.    NURSING PLAN OF CARE ORDERS:  Dressing changes: See Dressing Care orders  RN Prevention Protocol    NUTRITION RECOMMENDATIONS   Wound Team Recommendations:  N/A    DIET ORDERS (From admission to next 24h)       Start     Ordered    23 1258  Diet Order Diet: Cardiac; Fluid modifications: (optional): 1200 ml Fluid Restriction  ALL MEALS        Question Answer Comment   Diet: Cardiac  "   Fluid modifications: (optional) 1200 ml Fluid Restriction        12/07/23 1258                    PREVENTATIVE INTERVENTIONS:    Q shift Nam - performed per nursing policy  Q shift pressure point assessments - performed per nursing policy    Surface/Positioning  Standard/trauma mattress - Currently in Place  Reposition q 2 hours - Ordered  Waffle overlay  - Ordered    Offloading/Redistribution  Sacral offloading dressing (Silicone dressing) - Ordered  Float Heels off Bed with Pillows - Ordered       L BKA, R foot in ex fix      Respiratory  Silicone O2 tubing - Currently in Place  Gray Foam Ear protectors - Ordered    Anticipated discharge plans:  TBD        Vac Discharge Needs:  Vac Discharge plan is purely a recommendation from wound team and not a requirement for discharge unless otherwise stated by physician.  Not Applicable Pt not on a wound vac

## 2023-12-07 NOTE — OP REPORT
Date of operation: 12/6/2023    Service: Orthopaedic Surgery    Surgeon: Calvin Pryor MD    Assistant:   MD Kavita Thomason CSFA    Preoperative Diagnosis:   Right subtalar joint dislocation  Right talonavicular dislocation   Right ankle equinus contracture  Severe right pes planovalgus  Rheumatoid arthritis    Post operative Diagnosis: Same    Procedure Performed:   Open reduction of right subtalar joint dislocation  Open reduction of right talonavicular joint dislocation  Right foot triple arthrodesis  Right Achilles tendon lengthening  Application of multiplanar external fixator right lower extremity      Complications: None    Specimens: None    Tourniquet Time: 0 minutes    Implants: Moe medical salvage screws 7 mm x 2.  [place] fixos 5.0 millimeter screws x 2.  Del multiplanar external fixator      Indication for procedure: The patient is a pleasant 48-year-old male with a history of the above diagnoses.  He had previously lost the left lower extremity secondary to a wound that had developed from severe planovalgus and deformity.  We discussed operative and nonoperative treatment with the patient.  He was at significant risk of increased wound, deep infection secondary to his deformity and not limited ambulation and mobility on the left lower extremity secondary to below-knee amputation.  He elected for operative intervention. Risks of the procedure were discussed with the patient which include but not limited to bleeding, infection, damage to surrounding nerves, tendons, ligaments, other structures, need for future or revision surgery, continued pain, unsightly scar, dissatisfaction with outcome, DVT, stroke, myocardial infarction and even death.  Benefits include improved pain control and improved overall functional outcome.  The patient wished to proceed and the surgical consent forms were signed.    This is a staged procedure on the right lower extremity for planned  external fixator removal at approximately 8 weeks post surgically from this procedure.    Description of Procedure: On the date of surgery the patient was met in the preoperative area where the operative extremity was identified by myself, confirmed the patient, marked with my initials.  The patient was then brought back to the operating room, placed supine on the operating table.  Anesthesia was undertaken without incident.  A nonsterile thigh tourniquet was placed on the operative thigh, SCD on the contralateral lower extremity.  Operative extremity was prepped and draped in the usual sterile fashion.  Multidisciplinary timeout was performed to confirm the correct site, correct patient, correct procedure. The patient received antibiotics within 30 minutes of incision.     We began with the Achilles tendon lengthening.  Incision was made over the Achilles tendon.  3 incisions in the Achilles tendon, distal medial, more proximal lateral, more proximal medial were created.  We then dorsiflex the ankle and were able to achieve an approximately 15 to 20 degrees of ankle dorsiflexion.  These wounds were irrigated and closed.    We then created incisions over the subtalar joint at the sinus Tarsi in addition to an incision over the subtalar joint medially at the level of the sustentaculum kathleen, just dorsal of this and overlying the FDL and posterior tibial tendon sheaths.  These were carefully dissected down.  The subtalar joint was then entered with blunt dissection and a Stokes elevator.  We were then able to manually manipulate the joint both from medially as well as laterally and able to reduce this in a good overall position with manual manipulation.  This completed the subtalar joint open reduction.  We then placed an incision over the dorsal aspect of the talonavicular joint as well as using the extended medial incision at the talonavicular joint to introduce Stokes elevator as well as further tools for manipulation  to obtain reduction adequately of the talonavicular joint.  Once this was performed we were able to manipulate the foot into a more neutral and functional position.    We then turned our attention to the subtalar and talonavicular as well as calcaneocuboid joint preparation, triple arthrodesis.  Incision was made over the calcaneocuboid joint.  We then utilized the medial and lateral as well as dorsal and medial incisions for the subtalar and talonavicular joints respectively.  Cartilage was removed with minimally invasive burgers as well as osteotomes and curettes.  We were able to denude all cartilage.  We utilized fluoroscopic imaging to localize the position of the minimally invasive burst.  After removal of all cartilage these joint spaces were copiously irrigated.  We were able to reduce and maintain the reduction at the subtalar, talonavicular joint and calcaneocuboid joints.  Augment allograft was placed into the joint spaces.  We then reduced and placed fixation across the subtalar joint, talonavicular joint and calcaneocuboid joint.  Appropriate screws were placed regarding length.  Reduction was maintained.  AP, lateral, Choi and oblique views were taken of the ankle and foot demonstrating good overall alignment.  At this point the wounds were copiously irrigated and closed in layers.    At this point the ankle was then manipulated to place this into neutral dorsiflexion.  We then placed a temporary K wire across the ankle joint, to allow for application and placement of the multiplanar external fixator after manipulation into good position.    The multiplanar external fixator frame was then placed around the leg.  We then placed a total of 3 pins in the proximal ring, 2 pins in the middle ring, 5 pins in the distal ring through the foot.  The proximal and middle rings in the tibia were tensioned to 130 kg, the foot rings were tensioned to 90 kg.  Fluoroscopic imaging demonstrated good overall alignment  of the frame with regard to the foot as well as the leg.  Sterile dressings were applied.  Patient was normal anesthesia, down to the postoperative gurney and into PACU in stable condition.      Post-Operative Plan: The patient will be nonweightbearing to touchdown weightbearing for transfers on the right lower extremity.  Will continue to follow his clinical course.  He will also be continued to monitor for symptoms of congestive heart failure given his underlying history.  Appreciate hospitalist management and recommendations of his medical problems.

## 2023-12-07 NOTE — PROGRESS NOTES
Bedside report received.  Assessment complete.  A&O x 4. Patient calls appropriately.  Patient unable to ambulate at this time. Orders regarding mobility restrictions pending.Bed alarm on  Patient has 7/10 pain. Pain managed with prescribed medications.  Denies N&V. Tolerating cardiac diet.  RLE with dressing and ex fix in place. Patient toes warm and pink. Patient unable to feel sensation or move toes   + void, + flatus, - BM.  Patient denies SOB.  SCD's off-RLE with ex fix in place, Left AKA .    Review plan with of care with patient. Call light and personal belongings within reach. Hourly rounding in place. All needs met at this time.

## 2023-12-08 NOTE — PROGRESS NOTES
The patient was seen and evaluated at bedside    He is overall doing okay.  He reports a deep pain within the central portion of the tibia and leg.    Exam:  Dressings are clean and dry throughout the right lower extremity multiplanar external fixator.  With palpation there is good space between the middle, proximal, distal rings of the foot  Mild strikethrough on the dressings on the medial aspect of the hindfoot  Pin sites intact  Sensation grossly intact to light touch but slightly decreased  Able to fire toes    48-year-old male postoperative day 2 status post right Achilles tendon lengthening, triple arthrodesis, application of multiplanar external fixator with left lower extremity below-knee amputation wound    Plan:  Nonweightbearing on the right lower extremity, okay for touchdown as needed for transfers on the frame  Plan for surgery tomorrow with Dr. Roe for left revision below-knee amputation  N.p.o. at midnight  Nonweightbearing left lower extremity  All of the patient's questions were answered      Calvin Pryor MD

## 2023-12-08 NOTE — PROGRESS NOTES
4 Eyes Skin Assessment Completed by FAITH Aguilar and Meliat RN     Head WDL  Ears WDL  Nose WDL  Mouth WDL  Neck WDL  Breast/Chest WDL   Shoulder Blades WDL   Spine  WDL  (R) Arm/Elbow/Hand WDL  (L) Arm/Elbow/Hand WDL, PIV in place  Abdomen WDL  Pannus/groin: pink, red, excoriation  Scrotum/Coccyx/Buttocks WDL  (R) Leg: surgical incision with ex fix, dressing in place  (L) Leg: BKA, yellow, white wound              Devices In Places: ex fix, , nasal cannula        Interventions In Place: interdry, dressing change, TAPS system with wedges, Q2H turn, waffle mattress

## 2023-12-08 NOTE — CARE PLAN
The patient is Stable - Low risk of patient condition declining or worsening    Shift Goals  Clinical Goals: pain < 7 by end of day, maintain skin integrity  Patient Goals: Pain control    Progress made toward(s) clinical / shift goals: Wound prevention protocol implemented, dressing orders in place for Left BKA, Q2H turns, 2 RN skin check completed;     Patient is not progressing towards the following goals: patient consistently rates pain 7 or greater; scheduled MS contin and IR morphine in place, with 1 mg dilaudid IVP for breakthrough pain; patient encouraged to notify staff of pain       Problem: Pain - Standard  Goal: Alleviation of pain or a reduction in pain to the patient’s comfort goal  Outcome: Not Progressing

## 2023-12-08 NOTE — CARE PLAN
The patient is Stable - Low risk of patient condition declining or worsening    Shift Goals  Clinical Goals: pain control, wound care, skin integrity preservation  Patient Goals: pain control  Family Goals: not present at bedside    Progress made toward(s) clinical / shift goals:  Patient is permitted Q2 hour turns for skin integrity preservation. Right external fixator and dressing remain in place on RLE. Pain has been medicated per MAR.     Patient is not progressing towards the following goals: Pending discharge clearance.     Problem: Fall Risk  Goal: Patient will remain free from falls  Outcome: Progressing     Problem: Knowledge Deficit - Standard  Goal: Patient and family/care givers will demonstrate understanding of plan of care, disease process/condition, diagnostic tests and medications  Outcome: Progressing     Problem: Pain - Standard  Goal: Alleviation of pain or a reduction in pain to the patient’s comfort goal  Outcome: Progressing     Problem: Skin Integrity  Goal: Skin integrity is maintained or improved  Outcome: Progressing

## 2023-12-08 NOTE — THERAPY
"Physical Therapy   Initial Evaluation     Patient Name: Richard Hubbard II  Age:  48 y.o., Sex:  male  Medical Record #: [id number]  Today's Date: 12/7/2023     Precautions  Precautions: Fall Risk  Comments: L BKA with wound prior, R ex-fix touchdown WB for transfers    Assessment  Patient is a 48 y.o. male who was admitted s/p complex R foot reconstruction with placement of ex-fix. Additionally, pt has a wound on his L BKA stump that may need to be revised and his BNP is 39,000. PMH significant for gastric bypass, L BKA with prior stump abscess and osteomyelitis, pulmonary hypertension, aortic stenosis, rheumatoid arthritis, CHF.    Pt received in bed and agreeable to PT evaluation. Pt presents with impaired functional mobility secondary to pain, decreased strength, and dizziness upon sitting with possible orthostatic hypotension. Pt required min A due to the weight of the external fixator. Additionally, pt is still unable to feel his R foot or move his toes with the nerve block being >24 hours prior. Anticipate pt will benefit from further rehab prior to return home. Will follow for acute PT to progress as able.    Plan    Physical Therapy Initial Treatment Plan   Treatment Plan : Bed Mobility, Equipment, Neuro Re-Education / Balance, Self Care / Home Evaluation, Therapeutic Activities, Therapeutic Exercise  Treatment Frequency: 4 Times per Week  Duration: Until Therapy Goals Met    DC Equipment Recommendations: Unable to determine at this time  Discharge Recommendations: Recommend post-acute placement for additional physical therapy services prior to discharge home     Subjective    \"I've been so sweaty all day\"     Objective       12/07/23 1559   Precautions   Precautions Fall Risk   Comments L BKA with wound prior, R ex-fix touchdown WB for transfers   Vitals   Pulse (!) 101   Patient BP Position Sitting   Blood Pressure 109/68   O2 (LPM) 2   O2 Delivery Device Nasal Cannula   Vitals Comments Pt c/o " lightheadedness in sitting with BP dropping from 120's systolic to 109. Pt also profusely sweating, reports he has been all day.   Pain 0 - 10 Group   Therapist Pain Assessment Post Activity Pain Same as Prior to Activity;Nurse Notified  (pain in BLE, not rated)   Prior Living Situation   Prior Services Home-Independent   Housing / Facility 1 Story House   Steps Into Home 0   Steps In Home 0   Equipment Owned Front-Wheel Walker;4-Wheel Walker;Wheelchair   Lives with - Patient's Self Care Capacity Spouse;Child Less than 18 Years of Age   Comments Pt lives with his spouse and 2 kids <10. Spouse works during the day and the kids go to school, so he will be alone at times.   Prior Level of Functional Mobility   Bed Mobility Independent   Transfer Status Independent   Wheelchair Independent   Comments Pt reports primarily using wheelchair for mobility due to LE issues.   History of Falls   History of Falls No   Cognition    Level of Consciousness Alert   Comments Very pleasant and receptive to education   Active ROM Lower Body    Comments RLE limited by weight of ex-fix. LLE WFL   Strength Lower Body   Comments RLE 3-/5 and limited by weight of ex-fix. Unable to wiggle toes due to nerve block still in effect. LLE WFL   Sensation Lower Body   Comments Reports numbness to R foot since surgery and nerve block, unable to feel foot still   Lower Body Muscle Tone   Lower Body Muscle Tone  WDL   Neurological Concerns   Comments Concern re: sensory and motor deficits in R foot due to nerve block >24hrs ago   Coordination Lower Body    Coordination Lower Body  WDL   Balance Assessment   Sitting Balance (Static) Fair   Sitting Balance (Dynamic) Fair   Weight Shift Sitting Fair   Comments rounded back posture, not able to attempt standing due to WB status   Bed Mobility    Supine to Sit Minimal Assist   Sit to Supine Minimal Assist   Scooting Minimal Assist   Comments min A for RLE, pt managing trunk and LLE, HOB elevated   Gait  Analysis   Gait Level Of Assist Unable to Participate   Functional Mobility   Sit to Stand Unable to Participate   Mobility EOB only   Comments Pt limited by dizziness in sitting, able to scoot laterally but not up for transfer to    How much difficulty does the patient currently have...   Turning over in bed (including adjusting bedclothes, sheets and blankets)? 2   Sitting down on and standing up from a chair with arms (e.g., wheelchair, bedside commode, etc.) 1   Moving from lying on back to sitting on the side of the bed? 1   How much help from another person does the patient currently need...   Moving to and from a bed to a chair (including a wheelchair)? 3   Need to walk in a hospital room? 1   Climbing 3-5 steps with a railing? 1   6 clicks Mobility Score 9   Short Term Goals    Short Term Goal # 1 Pt will perform bed mobility with supervision to progress function in 6 visits.   Short Term Goal # 2 Pt will transfer bed to wheelchair with supervision to progress function in 6 visits.   Short Term Goal # 3 Pt will propel wheelchair 100ft at mod I level to progress function in 6 visits.   Education Group   Education Provided Role of Physical Therapist   Role of Physical Therapist Patient Response Patient;Acceptance;Explanation;Verbal Demonstration   Physical Therapy Initial Treatment Plan    Treatment Plan  Bed Mobility;Equipment;Neuro Re-Education / Balance;Self Care / Home Evaluation;Therapeutic Activities;Therapeutic Exercise   Treatment Frequency 4 Times per Week   Duration Until Therapy Goals Met   Problem List    Problems Pain;Decreased Activity Tolerance;Impaired Bed Mobility;Impaired Transfers   Anticipated Discharge Equipment and Recommendations   DC Equipment Recommendations Unable to determine at this time   Discharge Recommendations Recommend post-acute placement for additional physical therapy services prior to discharge home   Interdisciplinary Plan of Care Collaboration   IDT Collaboration with   Nursing   Patient Position at End of Therapy In Bed;Call Light within Reach;Tray Table within Reach;Phone within Reach   Collaboration Comments RN updated

## 2023-12-08 NOTE — THERAPY
Occupational Therapy Contact Note    Patient Name: Richard Hubbard II  Age:  48 y.o., Sex:  male  Medical Record #: [id number]  Today's Date: 12/8/2023 12/08/23 1405   Initial Contact Note    Initial Contact Note Order Received and Verified, Occupational Therapy Evaluation in Progress with Full Report to Follow.   Interdisciplinary Plan of Care Collaboration   Collaboration Comments OT eval held SHAHZAD revison tomorrow 12/9 needs eval 12/10   Session Information   Date / Session Number  12/8 HOLD eval 12/10

## 2023-12-08 NOTE — PROGRESS NOTES
"Orthopaedic Progress Note    Interval changes:    No acute issues overnight. Reports pain to right lower extremity, in addition to left BKA site. Working with PT. He would like to proceed with surgical intervention for his chronic left BKA wound.       /68   Pulse (!) 101   Temp 35.9 °C (96.6 °F) (Temporal)   Resp 17   Ht 1.803 m (5' 11\")   Wt 95.1 kg (209 lb 10.5 oz)   SpO2 97%       Patient seen and examined  No acute distress  Breathing non labored  RRR  Dressing clean dry and intact. Multiplanar ring fixator stable.  Distally neurovascularly intact, cap refill < 2 sec     Recent Labs     12/07/23  0804   WBC 10.3   RBC 3.22*   HEMOGLOBIN 8.9*   HEMATOCRIT 29.0*   MCV 90.1   MCH 27.6   MCHC 30.7*   RDW 52.4*   PLATELETCT 236   MPV 10.2       Active Hospital Problems    Diagnosis     Immunosuppression (HCC) [D84.9]     Dislocation of right subtalar joint [S93.314A]     Dislocation of right subtalar joint, initial encounter [S93.314A]     Right foot pain [M79.671]     Acute right ankle pain [M25.571]     Hx of gastric bypass [Z98.84]     Acute diastolic heart failure (HCC) [I50.31]     S/P BKA (below knee amputation) unilateral, left ([facility]) [Z89.512]     Chronic pain [G89.29]     Nonrheumatic aortic valve stenosis [I35.0]     Rheumatoid arthritis (HCC) [M06.9]      On Humira         Assessment/Plan:    Status post Right triple arthrodesis, placement of multiplanar ring fixator  Chronic Left BKA wound     POD#1  Wt bearing status - NWB RLE and LLE  Wound care/Drains - Maintain dressing clean and dry  Future Procedures - Plan for left revision BKA, I&D on 12/9, please make NPO at midnight  Sutures/Staples out- 10-14 days post operatively  PT/OT-initiated  Antibiotics: None  DVT Prophylaxis- TEDS/SCDs/Foot pumps        "

## 2023-12-08 NOTE — CARE PLAN
Problem: Fall Risk  Goal: Patient will remain free from falls  Outcome: Progressing     Problem: Knowledge Deficit - Standard  Goal: Patient and family/care givers will demonstrate understanding of plan of care, disease process/condition, diagnostic tests and medications  Outcome: Progressing     Problem: Pain - Standard  Goal: Alleviation of pain or a reduction in pain to the patient’s comfort goal  Outcome: Progressing     Problem: Skin Integrity  Goal: Skin integrity is maintained or improved  Outcome: Progressing       The patient is Stable - Low risk of patient condition declining or worsening    Shift Goals  Clinical Goals: pain control, maintain skin integrity  Patient Goals: pain control, rest  Family Goals: N/A    Progress made toward(s) clinical / shift goals:  Pt reported pain controlled per MAR this shift. Pt LLE dressing changed per order. Pt rested this shift.

## 2023-12-08 NOTE — PROGRESS NOTES
Postoperative day 1 status post right triple arthrodesis, Achilles lengthening, application of multiplanar external fixator    He has had a chronic left BKA wound which has been a continued issue  Wound care and images were reviewed which demonstrates worsening wound and exposed distal tibia  Plan for surgical intervention on the left lower extremity Saturday morning for left lower extremity BKA revision with shortening and closure with Dr. Roe    N.p.o. at midnight Friday night    Nonweightbearing right lower extremity and left lower extremity    PT/OT for bed to wheelchair transfer.

## 2023-12-08 NOTE — PROGRESS NOTES
Report received from RN, assumed care at 1845  Pt is A0X4, and responds appropriately   Pt declines any SOB, chest pain, new onset of numbness/ tingling  Pt rates pain at 7/10, on a scale of 1-10, pt medicated per MAR  Pt is voiding adequately and without hesitancy  Pt has - flatus, + bowel sounds, - BM, PTA  Pt is NWB to both RLE and LLE  Pt is tolerating a cardiac diet, pt denies any nausea/vomiting  Plan of care discussed, all questions answered. Explained importance of calling before getting OOB and pt verbalizes understanding. Explained importance of oral care. Call light is within reach, treaded slipper socks on, bed in lowest/ locked position, hourly rounding in place, all needs met at this time

## 2023-12-08 NOTE — PROGRESS NOTES
[facility] Medicine Daily Progress Note    Date of Service  12/8/2023    Chief Complaint  Richard Hubbard II is a 48 y.o. male admitted 12/6/2023 with No chief complaint on file.        Hospital Course  Patient is a 48-year-old male with a past medical history significant for moderate aortic stenosis, bicuspid aortic valve, mildly to moderate pulmonary hypertension with RSVP of 50, history of gastric bypass, history of rheumatoid arthritis, left BKA stump abscess/osteomyelitis complicated with MSSA bacteremia on 10/23.  Patient was supposed to be on Ancef daily 12/1 and continue Augmentin 875/125 1 tablet p.o. twice daily for 8 weeks.    He was admitted on 12/6 by orthopedic surgery and he was noted to have progressive flatfoot on the right side.  He underwent open reduction of right subtalar joint dislocation, right talonavicular joint dislocation right foot triple arthrodesis, right extensor tendon lengthening and application of multiplanar external fixator right lower extremity on 12/6.    Per orthopedic surgery, patient will be nonweightbearing to toe-touch weightbearing for transfer on the right lower extremity.  Will obtain PT/OT, continue DVT prophylaxis and multimodal pain management.    Interval events:  -- No acute events overnight, medicine has been stable, patient is currently requiring 2 L of oxygen  -- Unfortunately we do not have any blood work at the time of dictation, will obtain blood work  -- It is noted that patient have congestive heart failure and was started on Lasix, will monitor daily weight I/os, cardiac diet.  -- Will obtain PT/OT, DVT prophylaxis, multimodal pain management  -- Orthopedic surgery following, will follow the recommendation    12/8:  -- No acute events overnight, patient is alert, awake, answering questions appropriately.  Upon my evaluation, patient is noted to be diaphoretic, procalcitonin is elevated, patient is noted to be in acute renal failure with a creatinine of  Received Choice form at 0805  Agency/Facility Name: [facility]   Referral sent per Choice form @ 9391      2  -- Patient was started on IV fluid, repeat BMP 2 PM.  --Potassium is mild elevated at 5.6  -- I went and discussed with the patient considering patient renal failure morphine is not the best option so we will change his pain medication to oxycodone and Dilaudid, he stated understanding  --Patient's left leg did not look good, started on IV antibiotic, patient will be going for surgery tomorrow, will make n.p.o. at midnight.   -- Considering patient moderate aortic stenosis, Moderate MI, hopefully with preserved ejection fraction and elevated BNP; cards called   --Per chart review, he had hx of a-fib , but not on any anti-coagulation ; will provide remote tele monitoring    I have discussed this patient's plan of care and discharge plan at IDT rounds today with Case Management, Nursing, Nursing leadership, and other members of the IDT team.    Consultants/Specialty  orthopedics    Code Status  Full Code    Disposition  The patient is not medically cleared for discharge to home or a post-acute facility.  Anticipate discharge to: an inpatient rehabilitation hospital    I have placed the appropriate orders for post-discharge needs.    Review of Systems  Review of Systems   Constitutional:  Positive for malaise/fatigue.   Eyes:  Negative for blurred vision and double vision.   Musculoskeletal:  Positive for joint pain and myalgias.   Neurological:  Negative for headaches.   Psychiatric/Behavioral:  The patient is nervous/anxious.         Physical Exam  Temp:  [35.9 °C (96.6 °F)-36.3 °C (97.3 °F)] 36.2 °C (97.2 °F)  Pulse:  [] 93  Resp:  [17-20] 17  BP: (109-125)/(59-76) 110/59  SpO2:  [91 %-97 %] 91 %    Physical Exam  Vitals and nursing note reviewed.   Constitutional:       Appearance: Normal appearance.   HENT:      Head: Normocephalic and atraumatic.   Eyes:      Extraocular Movements: Extraocular movements intact.      Pupils: Pupils are equal, round, and reactive to light.   Cardiovascular:      Rate and  Rhythm: Normal rate.   Pulmonary:      Effort: No respiratory distress.      Comments: Decreased breath sounds at the bases  Musculoskeletal:      Cervical back: Neck supple.   Neurological:      Mental Status: He is alert and oriented to person, place, and time.   Psychiatric:         Mood and Affect: Mood normal.       Fluids    Intake/Output Summary (Last 24 hours) at 12/8/2023 1132  Last data filed at 12/8/2023 0453  Gross per 24 hour   Intake 1473 ml   Output 450 ml   Net 1023 ml         Laboratory  Recent Labs     12/07/23  0804 12/08/23  0322   WBC 10.3 17.3*   RBC 3.22* 3.39*   HEMOGLOBIN 8.9* 9.1*   HEMATOCRIT 29.0* 29.4*   MCV 90.1 86.7   MCH 27.6 26.8*   MCHC 30.7* 31.0*   RDW 52.4* 50.4*   PLATELETCT 236 205   MPV 10.2 10.3       Recent Labs     12/07/23  0804 12/08/23  0322   SODIUM 127* 126*   POTASSIUM 5.4 5.6*   CHLORIDE 94* 93*   CO2 20 18*   GLUCOSE 109* 77   BUN 24* 32*   CREATININE 1.81* 2.01*   CALCIUM 8.2* 8.3*                     Imaging  DX-CHEST-LIMITED (1 VIEW)   Final Result      1.  Enlarged cardiac silhouette with changes of vascular congestion.      DX-PORTABLE FLUORO > 1 HOUR   Final Result      Portable fluoroscopy utilized for 1 minute 48 seconds.         INTERPRETING LOCATION: [address] [place] [address], [id number]      DX-FOOT-COMPLETE 3+ RIGHT   Final Result      Digitized intraoperative radiograph is submitted for review. This examination is not for diagnostic purpose but for guidance during a surgical procedure. Please see the patient's chart for full procedural details.         INTERPRETING LOCATION: [address] [place] [address], [id number]           Assessment/Plan  * Dislocation of right subtalar joint  Assessment & Plan  He underwent open reduction of right subtalar joint dislocation, right talonavicular joint dislocation right foot triple arthrodesis, right extensor tendon lengthening and application of multiplanar external fixator right lower extremity on 12/6.    Per orthopedic surgery,  patient will be nonweightbearing to toe-touch weightbearing for transfer on the right lower extremity.  Will obtain PT/OT, continue DVT prophylaxis and multimodal pain management.    Immunosuppression (HCC)  Assessment & Plan  Chronic considering patient being on Humira for rheumatoid arthritis    Acute renal failure (ARF) ([facility])- (present on admission)  Assessment & Plan  Patient creatinine continued to get worse.  Will hold p.o. Lasix and continue IV fluid, repeat BMP in AM.  Avoid nephrotoxin  Bladder scan X 1    Acute diastolic heart failure ([facility])- (present on admission)  Assessment & Plan  Patient noted to be clinically dry today; will hold Lasix, continue IV fluid  Monitor   I/Os, daily weight    S/P BKA (below knee amputation) unilateral, left ([facility])- (present on admission)  Assessment & Plan  History of   --patient stated leaking from his wound, wound care continue to follow, patient was started on IV Ancef on 12/8  --will make n.p.o. at midnight  -- will be going for surgery tomorrow for revision and I&D of Left BKA    Chronic pain- (present on admission)  Assessment & Plan  Continue home meds      Sepsis ([facility])- (present on admission)  Assessment & Plan  This is Sepsis Not present on admission  SIRS criteria identified on my evaluation include: Tachycardia, with heart rate greater than 90 BPM and Leukocytosis, with WBC greater than 12,000  Clinical indicators of end organ dysfunction include Acute On Chronic Renal Failure, with creatinine >0.5 above baseline level  Source is Skin ( left BKA)  Sepsis protocol initiated  Crystalloid Fluid Administration: Resuscitation volume of 1.5 ordered. Reason that resuscitation volume of less than 30ml/kg was ordered concern for causing harm given CHF  IV antibiotics as appropriate for source of sepsis  Reassessment: I have reassessed the patient's hemodynamic status    Blood CX X 2, chest X-ray , UA was ordered  Started on iv abx    Nonrheumatic aortic valve stenosis-  (present on admission)  Assessment & Plan  Hx of   Present has bicuspid aortic valve, Moderate AS  Called cardiology    Rheumatoid arthritis (HCC)- (present on admission)  Assessment & Plan  On humaria  Patient is supposed to take on 12/30 he stated he did not take that medication         VTE prophylaxis: heparin      Addendum:  around 5.30 pm  Repeat blood work was reviewed, noted to have potassium of 5.8, creatinine of 2.02; patient was provided albuterol treatment, he was started on D5 half NS +75 Meq of Bicarb at 125 cc/hr; repeat BMP at 11 PM. Discussed with Night team in regards to checking K.    I have performed a physical exam and reviewed and updated ROS and Plan today (12/8/2023). In review of yesterday's note (12/7/2023), there are no changes except as documented above.

## 2023-12-08 NOTE — DISCHARGE PLANNING
HTH/SCP TCN chart review completed. Current discharge considerations are post acute placement, noted patient's first choice is [facility], second is [facility].  TCN will continue to follow and collaborate with discharge planning team as additional post acute needs arise. Thank you.    Completed:  PT recommending post acute placement - 12/7  Awaiting OT recommendations   Choice obtained: [facility] (1), [facility] (2)  SCP with [facility] PCP. Anticipate post acute placement, however noted patient has f/u scheduled 12/19 with multispeciality care.

## 2023-12-08 NOTE — PROGRESS NOTES
4 Eyes Skin Assessment Completed by FAITH Sen and FAITH Orr.    Head WDL  Ears WDL  Nose WDL  Mouth WDL  Neck WDL  Breast/Chest WDL  Shoulder Blades WDL  Spine WDL  (R) Arm/Elbow/Hand WDL  (L) Arm/Elbow/Hand WDL - PIV  Abdomen WDL - Telemonitor in place  Groin Redness and Blanching; excoriation under pannus; interdry in place  Scrotum/Coccyx/Buttocks Redness and Blanching Mepilex in place  (R) Leg Incision - ACE wrap and Ex-Fix in place  (L) Leg BKA - yellow/white wound present on stump; wound care performed  (R) Heel/Foot/Toe Incision - ACE wrap and Ex-fix in place  (L) Heel/Foot/Toe JI - BKA          Devices In Places Tele Box, Pulse Ox, and Nasal Cannula      Interventions In Place InterDry, Sacral Mepilex, TAP System, Pillows, Q2 Turns, Low Air Loss Mattress, Barrier Cream, and Dri-Odell Pads    Possible Skin Injury No - previously charted    Pictures Uploaded Into Epic N/A previously done  Wound Consult Placed N/A previously done  RN Wound Prevention Protocol Ordered Yes - previously

## 2023-12-09 PROBLEM — R57.0 CARDIOGENIC SHOCK (HCC): Status: ACTIVE | Noted: 2023-01-01

## 2023-12-09 PROBLEM — G93.41 ACUTE METABOLIC ENCEPHALOPATHY: Status: ACTIVE | Noted: 2023-01-01

## 2023-12-09 PROBLEM — I50.41 ACUTE COMBINED SYSTOLIC AND DIASTOLIC HEART FAILURE (HCC): Status: ACTIVE | Noted: 2023-01-01

## 2023-12-09 PROBLEM — R74.01 TRANSAMINITIS: Status: ACTIVE | Noted: 2023-01-01

## 2023-12-09 NOTE — PROGRESS NOTES
HOSPITALIST NOC CROSS COVER    Rapid response called for patient having rapid HR. telemetry monitor noted patient to be in SVT. However, EKG shows patient in wide-complex atrial fibrillation with RVR. Patient blood pressure also down into the 90s systolic. Patient asymptomatic.  Cardiologist, Dr Rojas, was consulted and evaluated patient.    Patient given 500 ml bolus IV fluids for blood pressure support, improving his blood pressure to the low 100s. Patient was given an amiodarone bolus and started on amiodarone drip, which has improved his HR to 110-120s fairly quickly. The patient was also started on heparin IV, as he is to have a left revision below-knee amputation, so would need to avoid oral anticoagulation. Patient has history of bleeding ulcer, so will need to monitor for bleeding while on heparin    Patient had earlier had a mildly elevated potassium 5.8. Repeat potassium mildly elevated again at 5.6. Given a dose of calcium gluconate. Patient also noticed to have hyponatremia and was somewhat acidotic, and repeat labs are essentially unchanged from earlier today.  Patient had already been started on sodium bicarb and so this was continued.     Lactate noted to be elevated to 4.6.  Procal added to labs and also noted to be elevated. Chest x-ray concerning for pneumonia. Patient is currently on Ancef secondary to left BKA.  Ancef discontinued and Rocephin and Doxy IV started.    Previous echocardiogram 10/20/23 shows normal LV function, EF 55%; mild mitral and tricuspid regurg; mod aortic valve stenosis; mod aortic insufficiency.     Workup:  CBC 15.4 - down from 17.3 earlier today  Hgb 8.9 - stable  Sodium 126   Potassium 5.6  Chloride 91  CO2 17  Agap 18  BUN 37  Cr 2.0  eGFR 41      Tbili 2.6  Lactic acid 4.6  Troponin 30  Procal 7.68  CXR: Small R pleural effusion with adjacent airspace disease            Worsening interstitial infiltrates and/or edema  EKG: Atrial fib, 165 bpm (beginning  of rapid)  Repeat EKG: Atrial fib, 133 bpm. LBBB (known)      VS: Temp 96.7 C, heart rate 116, respiratory rate 22, /57, SpO2 95% on 2 L NC    ASSESSMENT/PLAN  #Afib with RVR  #Hyperkalemia  #Hyponatremia  #Transamitis  #Sepsis secondary to UTI/Skin infection  #KATERYNA  - IV bolus 500 ml x 1  - Amiodarone bolus, followed by amiodarone drip  - Calcium gluconate x 1  - Continue bicarb  - Echocardiogram  - IV heparin (scheduled for OR tomorrow, so will not start oral anticoag)  - Monitor for bleed due to hx of bleeding ulcers  - Discontinue Ancef  - Start Rocephin and doxycycline  - Sputum culture  - Blood cultures  - Transfer to telemetry unit ([facility] is full; patient does not qualify for ICU at this time)  -Whether the patient is stable for surgery will be determined by surgery and attending in the morning. Currently patient has heart rate in the 110-120s while on amiodarone drip.  He remains asymptomatic.  Blood pressure currently 112/57

## 2023-12-09 NOTE — PROGRESS NOTES
Dr. Dobbs at bedside. Discussed decreased rate of amio gtt to 0.5, holding heparin due to bloody oral secretions, and allowing patient to have ice chips and some water.     OR RN also called for report, discussed with OR RN and Dr. Dobbs that patient is likely not appropriate for surgery at this time.

## 2023-12-09 NOTE — ASSESSMENT & PLAN NOTE
LVEF 45%  Grade 3 diastolic dysfunction  Continue spironolactone, 25 mg daily  Continue torsemide, 20 mg daily  Continue dapagliflozin, 10 mg daily  Improved

## 2023-12-09 NOTE — PROCEDURES
Procedure Note    Date: 12/9/2023  Time: 0245    Procedure: Central Venous Line placement and right heart catheterization  Site: R IJ vein    Indication: Cardiogenic shock  Consent: Informed consent obtained from patient or designated decision maker after explaining the benefits/risks of the procedure including but not limited to bleeding, infection, nerve or other deep structure injury, pneumothorax/hemothorax, arrythmia, or death. Patient or surrogate expressed understanding and agreement.    Time-out: Verbal consent was obtained. Immediately prior to procedure, a time out was called to verify the correct patient, procedure, equipment, support staff and site/side marked as required. Pre-procedure pain reported as 0/10 and post-procedure was 0/10.    Procedure: After obtaining consent, a time-out was performed. Appropriate site confirmed with ultrasound and patient positioned, prepped, and draped in sterile fashion. All those present wearing cap and mask and those physically participating remained adhering to sterile fashion with cap, mask, gloves, and gown. 5 mL of local anesthetic injected (1% lidocaine without epinephrine) achieving appropriate comfort level for patient. Vein localized and accessed using continuous ultrasound guidance and a 12 Fr introducer catheter placed using Seldinger technique. Able to aspirate dark, non-pulsatile blood through each lumen and sterile saline flushed easily before capping. Line secured and dressed in sterile fashion. Wire removal: After insertion of the catheter via Seldinger technique the guidewire was removed intact and confirmed by the assistant in the room.    Patient tolerated procedure well without any difficulties and remains in care of bedside nurse. CXR will be performed to confirm appropriate placement for internal jugular or subclavian CVLs.    After introducer sheath placed as described above, I floated a swan talat catheter into the R sided PA under pressure  guidance and performed a wedge procedure and obtained right heart pressures at 64 cm. The balloon was deflated and line secured in place. Additional pressures to be obtained through the catheter.    EBL: minimal  Complications: none  CXR: appropriately positioned tip of CVL without PTX    Jeremy Gonda, MD  Critical Care Medicine

## 2023-12-09 NOTE — CONSULTS
Critical Care Consultation    Date of consult: 12/9/2023    Referring Physician  NARCISA Donnelly    Reason for Consultation  Multiorgan failure, hypotension    History of Presenting Illness  48 y.o. male who presented 12/6/2023 with a past medical history significant for atrial fibrillation, bipolar disorder, obesity, rheumatoid arthritis, sleep apnea and grade 1 diastolic dysfunction with moderate aortic stenosis and pulmonary artery hypertension (RVSP 45 mmHg).  He was admitted to the hospital on December 6 for progressive flatfoot deformity/superficial wound needing open reduction and internal fixation of the right subtalar dislocation.  He was taken to the operating room on day of admission for open reduction of the dislocation as well as right foot triple arthrodeses, right Achilles tendon lengthening and application of multiplanar external fixator.  He was admitted to the hospital postoperatively by the hospitalist service to help manage his coexisting medical disease.  He was started on Lasix and remained on Humira for his rheumatoid arthritis.  During his hospital stay, patient started develop increasing oxygen demand and noted to be going into acute renal failure with elevated liver enzymes.  His IV Lasix was held and he was given IV fluids.  He was started on a bicarbonate drip when his potassium started to rise.  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  Surgery plan to take him back to the OR for I&D of his left BKA and he was started on sepsis protocol.  This evening, patient went into A-fib with RVR.  He was loaded with IV amiodarone and cardiology was consulted recommending diuresis and ongoing amiodarone and a repeat echocardiogram once his rate is better controlled.  I was called this evening however as patient started to develop worsening hypotension for which she was treated with 2 L of crystalloid but remained  hypotensive.  At the time my evaluation, I performed a bedside POCUS showing dilated and poorly functioning right and left ventricles with a dilated IVC.  I started him on a norepinephrine drip and transferred him to the intensive care unit where a [place]-Aung catheter was placed to help guide cardiac support.    Code Status  Full Code    Review of Systems  Review of Systems   Constitutional:  Positive for malaise/fatigue. Negative for chills and fever.   HENT:  Negative for congestion and sore throat.    Eyes:  Negative for blurred vision.   Respiratory:  Positive for cough and shortness of breath. Negative for sputum production.    Cardiovascular:  Positive for palpitations and leg swelling. Negative for chest pain.   Gastrointestinal:  Negative for abdominal pain, nausea and vomiting.   Genitourinary:  Negative for dysuria.   Musculoskeletal:  Negative for back pain and myalgias.   Skin:  Negative for rash.   Neurological:  Positive for weakness. Negative for dizziness, sensory change, speech change and headaches.   Endo/Heme/Allergies:  Does not bruise/bleed easily.   Psychiatric/Behavioral:  Negative for depression. The patient is not nervous/anxious.    All other systems reviewed and are negative.      Past Medical History   has a past medical history of ADD (attention deficit disorder), Alcohol abuse, Allergic rhinitis (05/21/2009), Anemia (09/2019), Anxiety (05/21/2009), Apnea, sleep, Arrhythmia, Back pain (05/21/2009), Bipolar disorder ([facility]), Bleeding ulcer (05/21/2009), Bleeding ulcer (05/21/2009), Bowel habit changes, Breath shortness, Congenital anomaly of mitral valve, Congestive heart failure ([facility]), Daytime sleepiness, Dental disorder, Depression (05/21/2009), Eczema (05/21/2009), GERD (gastroesophageal reflux disease), Heart burn, Heart murmur, Hemorrhagic disorder ([facility]), History of bleeding ulcers (02/12/2015), Hypertension (05/03/2021), Insomnia (05/21/2009), Intestinal hernia, Migraine (05/21/2009),  Morning headache, Obesity, morbid (HCC) (05/21/2009), Osteomyelitis ([facility]) (10/07/2019), Pain (09/2019), Pneumonia, Pubic ramus fracture ([facility]) (09/28/2019), Rheumatoid arteritis ([facility]) (09/2019), Sleep apnea (05/21/2009), and Snoring.    He has no past medical history of Hyperlipidemia.    Surgical History   has a past surgical history that includes gastric bypass laparoscopic (2000); irrigation & debridement ortho (Left, 10/09/2019); other surgical procedure; other surgical procedure (2019); pr total knee arthroplasty (Right, 07/22/2020); cholecystectomy (2000); tendon transfer (Right, 01/22/2020); other orthopedic surgery (Right, 2020); knee amputation below (Left, 03/09/2023); irrigation & debridement general (Left, 10/19/2023); incision and drainage orthopedic (Left, 10/22/2023); and orif, ankle (Right, 12/6/2023).    Family History  family history includes Arthritis in his mother; Cancer in his father and maternal grandmother; Depression in his mother; Heart Disease in his maternal grandmother; Hypertension in his mother; Psychiatric Illness in an other family member; Schizophrenia in his father; Stroke in his maternal aunt.    Social History   reports that he has been smoking cigarettes and cigars. He has been exposed to tobacco smoke. He has never used smokeless tobacco. He reports that he does not drink alcohol and does not use drugs.    Medications  Home Medications       Reviewed by Radha Perez R.N. (Registered Nurse) on 12/06/23 at 1332  Med List Status: Complete     Medication Last Dose Status   acetaminophen (TYLENOL) 500 MG Tab 12/4/2023 Active   Adalimumab (HUMIRA PEN) 40 MG/0.4ML Pen-injector Kit 11/20/2023 Active   albuterol 108 (90 Base) MCG/ACT Aero Soln inhalation aerosol 12/6/2023 Active   amoxicillin-clavulanate (AUGMENTIN) 875-125 MG Tab 12/6/2023 Active   dapagliflozin propanediol (FARXIGA) 10 MG Tab 12/1/2023 Active   DULoxetine (CYMBALTA) 60 MG Cap DR Particles delayed-release capsule  12/6/2023 Active   fluconazole (DIFLUCAN) 100 MG Tab UNK Active   furosemide (LASIX) 20 MG Tab 12/6/2023 Active   hydrOXYzine pamoate (VISTARIL) 25 MG Cap POST OP Active   methylPREDNISolone (MEDROL) 4 MG Tab 12/5/2023 Active   morphine (MS IR) 30 MG tablet 12/6/2023 Active   MORPHINE SULFATE ER PO 12/6/2023 Active   MOVANTIK 25 MG Tab 12/6/2023 Active   multivitamin Tab 12/1/2023 Active   Naloxone (NARCAN) 4 MG/0.1ML Liquid PRN Active   omeprazole (PRILOSEC) 20 MG delayed-release capsule 12/6/2023 Active   vitamin D3 (CHOLECALCIFEROL) 1000 Unit (25 mcg) Tab 12/1/2023 Active                  Current Facility-Administered Medications   Medication Dose Route Frequency Provider Last Rate Last Admin    sodium bicarbonate 75 mEq in 1/2 NS 1,000 mL infusion  75 mEq Intravenous Continuous Jeremy M Gonda, M.D.        Pharmacy Consult Request   Other PHARMACY TO DOSE Gil Dalton M.D.        oxyCODONE CR (OxyCONTIN) tablet 10 mg  10 mg Oral Q12HRS iGl Dalton M.D.   10 mg at 12/08/23 1707    oxycodone (Oxy-IR) immediate release tablet 15 mg  15 mg Oral Q6HRS PRN Gil Dalton M.D.        albuterol (Proventil) 2.5mg/0.5ml nebulizer solution 2.5 mg  2.5 mg Nebulization Q4H PRN (RT) Gil Dalton M.D.        DEXTROSE 50 % IV SOLN             heparin infusion 25,000 units in 500 mL 0.45% NACL  0-30 Units/kg/hr Intravenous Continuous Gil Dalton M.D. 34.2 mL/hr at 12/08/23 2214 18 Units/kg/hr at 12/08/23 2214    heparin injection 3,800 Units  40 Units/kg Intravenous PRN Gil Dalton M.D.        dextrose 5% infusion   Intravenous Continuous Natalie Watters, A.P.R.N. 30 mL/hr at 12/08/23 2000 New Bag at 12/08/23 2000    amiodarone (Nexterone) 360 mg/200 mL infusion  0.5 mg/min Intravenous Continuous Romaine Clements M.D. 16.7 mL/hr at 12/09/23 0119 0.5 mg/min at 12/09/23 0119    cefTRIAXone (Rocephin) syringe 2,000 mg  2,000 mg Intravenous Q24HRS MARTÍN Burgos        doxycycline (Vibramycin) 100 mg in NS  "100 mL IVPB  100 mg Intravenous Q12HRS MARTÍN Burgos 100 mL/hr at 12/09/23 0113 100 mg at 12/09/23 0113    dakins 0.125% (1/4 strength) topical soln   Topical BID Calvin Pryor M.D.   1 mL at 12/08/23 1711    omeprazole (PriLOSEC) capsule 20 mg  20 mg Oral DAILY Gil Dalton M.D.   20 mg at 12/08/23 0445    furosemide (Lasix) injection 20 mg  20 mg Intravenous BID DIURETIC KAILASH TapiaDMary   20 mg at 12/08/23 0445    albuterol inhaler 2 Puff  2 Puff Inhalation Q6HRS PRN Torsten Grove M.D.        [Held by provider] DULoxetine (Cymbalta) capsule 60 mg  60 mg Oral DAILY Torsten Grove M.D.   60 mg at 12/08/23 0502    vitamin D3 (Cholecalciferol) tablet 1,000 Units  1,000 Units Oral DAILY Torsten Grove M.D.   1,000 Units at 12/08/23 0445    HYDROmorphone (Dilaudid) injection 1 mg  1 mg Intravenous Q4HRS PRN Torsten Gorve M.D.   1 mg at 12/08/23 2221       Allergies  Allergies   Allergen Reactions    Nsaids      Bleeding, \"I had a bleeding ulcer\"    Diphenhydramine Unspecified     \"I get agitated\"    Mirtazapine Unspecified     I had a hard time waking up, lacking mental focus.     Penicillins      \"Had some dizziness\"  Tolerated Unasyn 10/2019  Tolerated Zosyn 02/2023    Promethazine Unspecified     \"I get very agitated\"       Vital Signs last 24 hours  Temp:  [36.2 °C (97.2 °F)-36.7 °C (98.1 °F)] 36.7 °C (98.1 °F)  Pulse:  [] 84  Resp:  [14-24] 16  BP: ()/(45-92) 78/46  SpO2:  [2 %-98 %] 95 %    Physical Exam  Physical Exam  Vitals and nursing note reviewed.   Constitutional:       General: He is sleeping. He is in acute distress.      Appearance: He is well-developed. He is obese. He is ill-appearing.      Interventions: Nasal cannula in place.   HENT:      Head: Normocephalic and atraumatic.      Nose: Nose normal. No congestion.      Mouth/Throat:      Mouth: Mucous membranes are moist.      Pharynx: Oropharynx is clear. No oropharyngeal exudate.   Eyes:      " General: No scleral icterus.     Conjunctiva/sclera: Conjunctivae normal.      Pupils: Pupils are equal, round, and reactive to light.   Neck:      Vascular: No JVD.   Cardiovascular:      Rate and Rhythm: Normal rate and regular rhythm. Occasional Extrasystoles are present.     Chest Wall: PMI is displaced.      Pulses: Decreased pulses.           Radial pulses are 1+ on the right side and 1+ on the left side.      Heart sounds: Murmur heard.   Pulmonary:      Effort: Tachypnea present. No accessory muscle usage or respiratory distress.      Breath sounds: No stridor. Examination of the right-lower field reveals decreased breath sounds and rales. Examination of the left-lower field reveals decreased breath sounds and rales. Decreased breath sounds and rales present. No wheezing.      Comments: Protecting airway, moans when he sleeps but speaks in full sentences when awake  Abdominal:      General: Bowel sounds are normal. There is no distension.      Palpations: Abdomen is soft.      Tenderness: There is no abdominal tenderness. There is no guarding or rebound.   Musculoskeletal:         General: No tenderness.      Cervical back: Neck supple. No tenderness.      Right lower leg: Edema present.      Left lower leg: Edema present.      Comments: Right lower extremity external hardware noted, left lower extremity amputation site dressing is clean/dry/intact, edema in lower extremities and hands   Skin:     General: Skin is warm and dry.      Capillary Refill: Capillary refill takes less than 2 seconds.      Coloration: Skin is not pale.   Neurological:      General: No focal deficit present.      Mental Status: He is oriented to person, place, and time and easily aroused. He is lethargic.      Cranial Nerves: No cranial nerve deficit.      Sensory: No sensory deficit.   Psychiatric:         Mood and Affect: Mood normal.         Behavior: Behavior normal. Behavior is cooperative.         Thought Content: Thought  content normal.         Fluids    Intake/Output Summary (Last 24 hours) at 12/9/2023 0139  Last data filed at 12/9/2023 0120  Gross per 24 hour   Intake 1632.45 ml   Output 950 ml   Net 682.45 ml       Laboratory  Recent Results (from the past 48 hour(s))   CBC WITHOUT DIFFERENTIAL    Collection Time: 12/07/23  8:04 AM   Result Value Ref Range    WBC 10.3 4.8 - 10.8 K/uL    RBC 3.22 (L) 4.70 - 6.10 M/uL    Hemoglobin 8.9 (L) 14.0 - 18.0 g/dL    Hematocrit 29.0 (L) 42.0 - 52.0 %    MCV 90.1 81.4 - 97.8 fL    MCH 27.6 27.0 - 33.0 pg    MCHC 30.7 (L) 32.3 - 36.5 g/dL    RDW 52.4 (H) 35.9 - 50.0 fL    Platelet Count 236 164 - 446 K/uL    MPV 10.2 9.0 - 12.9 fL   Renal Function Panel    Collection Time: 12/07/23  8:04 AM   Result Value Ref Range    Sodium 127 (L) 135 - 145 mmol/L    Potassium 5.4 3.6 - 5.5 mmol/L    Chloride 94 (L) 96 - 112 mmol/L    Co2 20 20 - 33 mmol/L    Glucose 109 (H) 65 - 99 mg/dL    Creatinine 1.81 (H) 0.50 - 1.40 mg/dL    Bun 24 (H) 8 - 22 mg/dL    Calcium 8.2 (L) 8.5 - 10.5 mg/dL    Correct Calcium 8.7 8.5 - 10.5 mg/dL    Phosphorus 5.8 (H) 2.5 - 4.5 mg/dL    Albumin 3.4 3.2 - 4.9 g/dL   MAGNESIUM    Collection Time: 12/07/23  8:04 AM   Result Value Ref Range    Magnesium 2.1 1.5 - 2.5 mg/dL   proBrain Natriuretic Peptide, NT    Collection Time: 12/07/23  8:04 AM   Result Value Ref Range    NT-proBNP 18365 (H) 0 - 125 pg/mL   ESTIMATED GFR    Collection Time: 12/07/23  8:04 AM   Result Value Ref Range    GFR (CKD-EPI) 46 (A) >60 mL/min/1.73 m 2   POCT glucose device results    Collection Time: 12/07/23  5:35 PM   Result Value Ref Range    POC Glucose, Blood 90 65 - 99 mg/dL   CBC WITHOUT DIFFERENTIAL    Collection Time: 12/08/23  3:22 AM   Result Value Ref Range    WBC 17.3 (H) 4.8 - 10.8 K/uL    RBC 3.39 (L) 4.70 - 6.10 M/uL    Hemoglobin 9.1 (L) 14.0 - 18.0 g/dL    Hematocrit 29.4 (L) 42.0 - 52.0 %    MCV 86.7 81.4 - 97.8 fL    MCH 26.8 (L) 27.0 - 33.0 pg    MCHC 31.0 (L) 32.3 - 36.5 g/dL     RDW 50.4 (H) 35.9 - 50.0 fL    Platelet Count 205 164 - 446 K/uL    MPV 10.3 9.0 - 12.9 fL   Renal Function Panel    Collection Time: 12/08/23  3:22 AM   Result Value Ref Range    Sodium 126 (L) 135 - 145 mmol/L    Potassium 5.6 (H) 3.6 - 5.5 mmol/L    Chloride 93 (L) 96 - 112 mmol/L    Co2 18 (L) 20 - 33 mmol/L    Glucose 77 65 - 99 mg/dL    Creatinine 2.01 (H) 0.50 - 1.40 mg/dL    Bun 32 (H) 8 - 22 mg/dL    Calcium 8.3 (L) 8.5 - 10.5 mg/dL    Correct Calcium 8.9 8.5 - 10.5 mg/dL    Phosphorus 5.6 (H) 2.5 - 4.5 mg/dL    Albumin 3.3 3.2 - 4.9 g/dL   ESTIMATED GFR    Collection Time: 12/08/23  3:22 AM   Result Value Ref Range    GFR (CKD-EPI) 40 (A) >60 mL/min/1.73 m 2   PROCALCITONIN    Collection Time: 12/08/23  3:22 AM   Result Value Ref Range    Procalcitonin 7.68 (H) <0.25 ng/mL   proBrain Natriuretic Peptide, NT    Collection Time: 12/08/23  3:22 AM   Result Value Ref Range    NT-proBNP 09537 (H) 0 - 125 pg/mL   URINE SODIUM RANDOM    Collection Time: 12/08/23  5:00 AM   Result Value Ref Range    Sodium, Urine -per volume <20 mmol/L   URINE CREATININE RANDOM    Collection Time: 12/08/23  5:00 AM   Result Value Ref Range    Creatinine, Random Urine 118.97 mg/dL   OSMOLALITY URINE    Collection Time: 12/08/23  5:00 AM   Result Value Ref Range    Osmolality Urine 396 300 - 900 mOsm/kg H2O   URINALYSIS    Collection Time: 12/08/23 12:29 PM    Specimen: Urine, Clean Catch   Result Value Ref Range    Color DK Yellow     Character Clear     Specific Gravity 1.016 <1.035    Ph 5.0 5.0 - 8.0    Glucose Negative Negative mg/dL    Ketones Negative Negative mg/dL    Protein Negative Negative mg/dL    Bilirubin Negative Negative    Urobilinogen, Urine 1.0 Negative    Nitrite Negative Negative    Leukocyte Esterase Negative Negative    Occult Blood Negative Negative    Micro Urine Req see below    Basic Metabolic Panel    Collection Time: 12/08/23  2:22 PM   Result Value Ref Range    Sodium 127 (L) 135 - 145 mmol/L     Potassium 5.8 (H) 3.6 - 5.5 mmol/L    Chloride 91 (L) 96 - 112 mmol/L    Co2 17 (L) 20 - 33 mmol/L    Glucose 59 (L) 65 - 99 mg/dL    Bun 36 (H) 8 - 22 mg/dL    Creatinine 2.02 (H) 0.50 - 1.40 mg/dL    Calcium 8.1 (L) 8.5 - 10.5 mg/dL    Anion Gap 19.0 (H) 7.0 - 16.0   ESTIMATED GFR    Collection Time: 23  2:22 PM   Result Value Ref Range    GFR (CKD-EPI) 40 (A) >60 mL/min/1.73 m 2   EKG    Collection Time: 23  5:13 PM   Result Value Ref Range    Report       [facility]    Test Date:  2023  Pt Name:    LESLIE BADILLO             Department: 141  MRN:        [id number]                      Room:       T407  Gender:     Male                         Technician: DON  :        1975                   Requested By:YASMINE ROSALES  Order #:    [id number]                    Reading MD: Bhupendra Harley MD    Measurements  Intervals                                Axis  Rate:       91                           P:          18  LA:         201                          QRS:        -21  QRSD:       125                          T:          51  QT:         385  QTc:        474    Interpretive Statements  Sinus rhythm  Borderline prolonged LA interval  Left bundle branch block  Compared to ECG 2023 18:08:35  Left bundle-branch block now present  Intraventricular conduction delay no longer present  Myocardial infarct finding no longer present  Electronically Signed On 2023 17:17:09 PST by Bhupendra Harley MD     EKG    Collection Time: 23  7:21 PM   Result Value Ref Range    Report       [facility]    Test Date:  2023  Pt Name:    LESLIE BADILLO             Department: 141  MRN:        [id number]                      Room:       T728  Gender:     Male                         Technician: LUIS  :        1975                   Requested By:ANTONIA SCALES  Order #:    [id number]                    Reading MD: Romaine Clements MD    Measurements  Intervals                                 Axis  Rate:       165                          P:          -17  DC:         90                           QRS:        261  QRSD:       124                          T:          161  QT:         287  QTc:        476    Interpretive Statements  Probable atrial fibrillation with aberrancy  Consider left anterior fasicular block  Cannot exclude ischemia  Electronically Signed On 12- 23:15:07 PST by Romaine Clements MD     Troponins NOW    Collection Time: 12/08/23  7:39 PM   Result Value Ref Range    Troponin T 30 (H) 6 - 19 ng/L   Comp Metabolic Panel    Collection Time: 12/08/23  7:39 PM   Result Value Ref Range    Sodium 126 (L) 135 - 145 mmol/L    Potassium 5.6 (H) 3.6 - 5.5 mmol/L    Chloride 91 (L) 96 - 112 mmol/L    Co2 17 (L) 20 - 33 mmol/L    Anion Gap 18.0 (H) 7.0 - 16.0    Glucose 82 65 - 99 mg/dL    Bun 37 (H) 8 - 22 mg/dL    Creatinine 2.00 (H) 0.50 - 1.40 mg/dL    Calcium 7.9 (L) 8.5 - 10.5 mg/dL    Correct Calcium 8.5 8.5 - 10.5 mg/dL    AST(SGOT) 586 (H) 12 - 45 U/L    ALT(SGPT) 127 (H) 2 - 50 U/L    Alkaline Phosphatase 253 (H) 30 - 99 U/L    Total Bilirubin 2.6 (H) 0.1 - 1.5 mg/dL    Albumin 3.2 3.2 - 4.9 g/dL    Total Protein 7.0 6.0 - 8.2 g/dL    Globulin 3.8 (H) 1.9 - 3.5 g/dL    A-G Ratio 0.8 g/dL   MAGNESIUM    Collection Time: 12/08/23  7:39 PM   Result Value Ref Range    Magnesium 2.2 1.5 - 2.5 mg/dL   PHOSPHORUS    Collection Time: 12/08/23  7:39 PM   Result Value Ref Range    Phosphorus 5.6 (H) 2.5 - 4.5 mg/dL   CBC WITHOUT DIFFERENTIAL    Collection Time: 12/08/23  7:39 PM   Result Value Ref Range    WBC 15.4 (H) 4.8 - 10.8 K/uL    RBC 3.27 (L) 4.70 - 6.10 M/uL    Hemoglobin 8.9 (L) 14.0 - 18.0 g/dL    Hematocrit 28.4 (L) 42.0 - 52.0 %    MCV 86.9 81.4 - 97.8 fL    MCH 27.2 27.0 - 33.0 pg    MCHC 31.3 (L) 32.3 - 36.5 g/dL    RDW 50.0 35.9 - 50.0 fL    Platelet Count 195 164 - 446 K/uL    MPV 10.4 9.0 - 12.9 fL   LACTIC ACID    Collection Time: 12/08/23  7:39 PM    Result Value Ref Range    Lactic Acid 4.6 (HH) 0.5 - 2.0 mmol/L   aPTT    Collection Time: 23  7:39 PM   Result Value Ref Range    APTT 34.6 24.7 - 36.0 sec   Prothrombin Time    Collection Time: 23  7:39 PM   Result Value Ref Range    PT 26.4 (H) 12.0 - 14.6 sec    INR 2.39 (H) 0.87 - 1.13   Heparin Xa (Unfractionated)    Collection Time: 23  7:39 PM   Result Value Ref Range    Heparin Xa (UFH) <0.10 IU/mL   ESTIMATED GFR    Collection Time: 23  7:39 PM   Result Value Ref Range    GFR (CKD-EPI) 41 (A) >60 mL/min/1.73 m 2   CORTISOL    Collection Time: 23  7:39 PM   Result Value Ref Range    Cortisol 39.2 (H) 0.0 - 23.0 ug/dL   EKG    Collection Time: 23  9:03 PM   Result Value Ref Range    Report       [facility]    Test Date:  2023  Pt Name:    LESLIE BADILLO             Department: 141  MRN:        [id number]                      Room:       [facility]  Gender:     Male                         Technician: LUIS  :        1975                   Requested By:HERIBERTO MONTES DE OCA  Order #:    [id number]                    Reading MD: Romaine Clements MD    Measurements  Intervals                                Axis  Rate:       133                          P:          205  RI:         142                          QRS:        -17  QRSD:       138                          T:          72  QT:         360  QTc:        536    Interpretive Statements  Probable atrial fibrillation with rapid ventricular response and intermittent  aberrancy  Electronically Signed On 2023 23:18:20 PST by Romaine Clements MD     Troponins in two (2) hours    Collection Time: 23 10:03 PM   Result Value Ref Range    Troponin T 27 (H) 6 - 19 ng/L   PROCALCITONIN    Collection Time: 23 10:03 PM   Result Value Ref Range    Procalcitonin 9.39 (H) <0.25 ng/mL   POCT glucose device results    Collection Time: 23 11:20 PM   Result Value Ref Range    POC Glucose, Blood 137 (H) 65 - 99  mg/dL   LACTIC ACID    Collection Time: 23 11:23 PM   Result Value Ref Range    Lactic Acid 2.9 (H) 0.5 - 2.0 mmol/L   CBC WITHOUT DIFFERENTIAL    Collection Time: 23 11:23 PM   Result Value Ref Range    WBC 15.6 (H) 4.8 - 10.8 K/uL    RBC 3.15 (L) 4.70 - 6.10 M/uL    Hemoglobin 8.7 (L) 14.0 - 18.0 g/dL    Hematocrit 28.3 (L) 42.0 - 52.0 %    MCV 89.8 81.4 - 97.8 fL    MCH 27.6 27.0 - 33.0 pg    MCHC 30.7 (L) 32.3 - 36.5 g/dL    RDW 52.3 (H) 35.9 - 50.0 fL    Platelet Count 179 164 - 446 K/uL    MPV 10.5 9.0 - 12.9 fL   PROCALCITONIN    Collection Time: 23 11:23 PM   Result Value Ref Range    Procalcitonin 10.30 (HH) <0.25 ng/mL   Renal Function Panel    Collection Time: 23 11:23 PM   Result Value Ref Range    Sodium 123 (L) 135 - 145 mmol/L    Potassium 4.9 3.6 - 5.5 mmol/L    Chloride 91 (L) 96 - 112 mmol/L    Co2 17 (L) 20 - 33 mmol/L    Glucose 134 (H) 65 - 99 mg/dL    Creatinine 1.92 (H) 0.50 - 1.40 mg/dL    Bun 38 (H) 8 - 22 mg/dL    Calcium 7.4 (L) 8.5 - 10.5 mg/dL    Correct Calcium 8.3 (L) 8.5 - 10.5 mg/dL    Phosphorus 5.1 (H) 2.5 - 4.5 mg/dL    Albumin 2.9 (L) 3.2 - 4.9 g/dL   ESTIMATED GFR    Collection Time: 23 11:23 PM   Result Value Ref Range    GFR (CKD-EPI) 43 (A) >60 mL/min/1.73 m 2   EKG    Collection Time: 23 11:25 PM   Result Value Ref Range    Report       [facility]    Test Date:  2023  Pt Name:    LESLIE BADILLO             Department: 171  MRN:        [id number]                      Room:       T728  Gender:     Male                         Technician: LUIS  :        1975                   Requested By:YASMINE ROSALES  Order #:    [id number]                    Reading MD:    Measurements  Intervals                                Axis  Rate:       121                          P:          0  WI:         0                            QRS:        -28  QRSD:       136                          T:          52  QT:         356  QTc:         505    Interpretive Statements  Atrial flutter/fibrillation  Left bundle branch block  Compared to ECG 12/08/2023 21:03:18  Left bundle-branch block now present         Imaging  DX-CHEST-PORTABLE (1 VIEW)   Final Result         1. [place]-Aung catheter tip is located within a right lower lobe pulmonary arterial branch, repositioning may be indicated.   2. Stable pulmonary edema, cardiomegaly, and small pleural effusions.      DX-CHEST-PORTABLE (1 VIEW)   Final Result      1.  Small right pleural effusion with adjacent airspace disease.   2.  Worsening interstitial infiltrates and/or edema.      DX-CHEST-LIMITED (1 VIEW)   Final Result      1.  Enlarged cardiac silhouette with changes of vascular congestion.      DX-PORTABLE FLUORO > 1 HOUR   Final Result      Portable fluoroscopy utilized for 1 minute 48 seconds.         INTERPRETING LOCATION: [address] , [place], [address]      DX-FOOT-COMPLETE 3+ RIGHT   Final Result      Digitized intraoperative radiograph is submitted for review. This examination is not for diagnostic purpose but for guidance during a surgical procedure. Please see the patient's chart for full procedural details.         INTERPRETING LOCATION: [address] , [place], [address]      EC-ECHOCARDIOGRAM COMPLETE W/ CONT    (Results Pending)    *Personally reviewed chest x-ray showing worsening pulmonary edema with enlarged cardiac silhouette and small bilateral effusions   *Personally reviewed EKG showing atrial fibrillation with a heart rate of 133 with aberrancy, prolonged QTc interval of 536, no ST elevation   *Personally performed and reviewed bedside POCUS showing dilated and poorly functioning left and right ventricles, no pericardial effusion, plump IVC    Assessment/Plan  * Dislocation of right subtalar joint  Assessment & Plan  S/p ORIF of right subtalar dislocation as well as application of external fixator 12/6  Ortho following  PT/OT    Acute metabolic encephalopathy  Assessment & Plan  Secondary  to shock  Avoid sedatives with close neurologic monitoring  Aspiration precautions    Cardiogenic shock ([facility])  Assessment & Plan  Transfer to the ICU  Place PA catheter  Begin norepinephrine drip to maintain mean arterial pressure greater than 65  Once stable on norepinephrine, begin very low-dose dobutamine given history of moderate AS and concern for reducing afterload and causing coronary ischemia but needing inotropic support  Monitor lactic acid, urine output, vital signs, endorgan function for adequacy of resuscitation  Begin Lasix drip  Echocardiogram, DVT ultrasound    Acute renal failure (ARF) ([facility])- (present on admission)  Assessment & Plan  Secondary to cardiogenic shock/ATN, nonoliguric  Avoid nephrotoxins  Monitor creatinine, urine output, electrolytes closely while maintaining perfusion to the kidneys  Continue low-dose bicarbonate drip given coexisting hyperkalemia    Acute combined systolic and diastolic heart failure ([facility])- (present on admission)  Assessment & Plan  Last echo from October 20, 2023 with EF 55%, grade 1 diastolic dysfunction with moderate AAS/AI and RVSP of 45 mmHg --> now in biventricular failure as seen on POCUS, ?A-fib induced  Formal echo pending  Unable to tolerate GDMT other than diuresis for now  Cardiology consulted    Atrial fibrillation with RVR ([facility])- (present on admission)  Assessment & Plan  History of A-fib, went into AVR 12/8, converted back to normal sinus rhythm 12/9  Continue IV amiodarone at 1 mg an hour for now  Optimize electrolytes, continuous telemetry monitoring  Echo  Heparin drip    Sepsis ([facility])- (present on admission)  Assessment & Plan  This is Septic shock Not present on admission  SIRS criteria identified on my evaluation include: Tachycardia, with heart rate greater than 90 BPM, Tachypnea, with respirations greater than 20 per minute, and Leukocytosis, with WBC greater than 12,000  Clinical indicators of end organ dysfunction include Hypotension with  systolic blood pressure less than 90 or MAP less than 65  Indicators of septic shock include: Sepsis present and persistent hypotension despite fluid resuscitation   Sources is: Soft tissue  S/p sepsis protocol initiated 12/8  Crystalloid Fluid Administration: Resuscitation volume of 2 L ordered. Reason that resuscitation volume of less than 30ml/kg was ordered concern for causing harm given CHF  IV antibiotics as appropriate for source of sepsis  Reassessment: I have reassessed the patient's hemodynamic status  Monitor lactic acid, urine output, vital signs closely for adequacy of resuscitation    Nonrheumatic aortic valve stenosis- (present on admission)  Assessment & Plan  Moderate on last echo in October 2023  Repeat echo  Cautious with heart rate and afterload reduction    Transaminitis  Assessment & Plan  Secondary to shock liver  Avoid hepatotoxins  Monitor LFTs and synthetic function while maintaining perfusion to the liver    Immunosuppression (HCC)  Assessment & Plan  Holding Humira for now given infected left lower extremity    S/P BKA (below knee amputation) unilateral, left (HCC)- (present on admission)  Assessment & Plan  With infected stump  Continue antibiotics, follow-up on cultures  Plan for I&D of left BKA when stabilized  Wound care    Rheumatoid arthritis (HCC)- (present on admission)  Assessment & Plan  As needed analgesics  Holding immunosuppressants for now in the setting of infection    Hx of gastric bypass- (present on admission)  Assessment & Plan  Encourage ongoing weight loss management    Hyponatremia- (present on admission)  Assessment & Plan  Secondary to heart failure  Monitor with above therapies    Chronic pain- (present on admission)  Assessment & Plan  Continue as needed analgesics        Discussed patient condition and risk of morbidity and/or mortality with Hospitalist, RN, RT, Patient, and cardiology.    The patient remains critically ill.  Critical care time = 122 minutes in  directly providing and coordinating critical care and extensive data review.  No time overlap and excludes procedures.    Please note that this dictation was created using voice recognition software. I have made every reasonable attempt to correct obvious errors, but there may be errors of grammar and possibly content that I did not discover before finalizing the note.

## 2023-12-09 NOTE — CONSULTS
CARDIOLOGY CONSULTATION NOTE      Date of Consultation: 12/8/2023  Consulting Provider: Gil Dalton M.d.    Patient Name: Richard Hubbard  YOB: 1975  MRN: [id number]    Reason for Consultation:   Tachycardia    Assessment/Recommendations:   # Paroxysmal atrial fibrillation with rapid ventricular response: He has a known history of prior atrial fibrillation CT today appears irregular insistent with atrial fibrillation with aberrancy.  He has already been given a bolus of amiodarone, with his borderline low blood pressures and only recent development of atrial fibrillation would be reasonable to start a drip for control.  May also convert on atrial fibrillation drip.  Would also recommend starting a heparin drip if not otherwise contraindicated with his recent surgery.    # Acute kidney injury, hyperkalemia: He developed acute kidney injury with hyperkalemia after his initial surgery.  He does appear volume overloaded with elevated JVP and evidence of pulmonary edema on chest x-ray.  Defer management of hyperkalemia to primary service, but would recommend trying a higher dose of Lasix 40 mg IV.  This could be deferred to the morning for patient comfort as he is not having difficulty maintaining his saturations.    # History of bicuspid aortic valve, moderate aortic stenosis, heart failure with preserved ejection fraction: Recommend control of atrial fibrillation and administration of intravenous Lasix as above.    # Acute liver injury, lactic acidosis: These are concerning for poor end-organ perfusion.  Although he did have a recent echocardiogram, given worsening volume overload and evidence of hypoperfusion, would recommend repeating an echocardiogram when his heart rate is better controlled.    History of Present Illness:   Richard Hubbard II is a 48 year-old gentleman with a past medical history of bicuspid aortic valve with moderate stenosis, paroxysmal atrial fibrillation, sleep  apnea, heart failure with preserved ejection fraction, rheumatoid arthritis, prior left BKA, and recent right ankle surgery.  Briefly, he underwent right ankle surgery on 12/6/2023.  Postoperatively, he developed acute kidney injury and hyperkalemia.  He had been maintained on home Lasix, although on a once daily dose instead of his usual home dose of twice daily.  This evening, he developed sudden onset of tachycardia.  An ECG was checked, which demonstrated atrial fibrillation with rapid ventricular response and abberancy, and Cardiology was consulted for further evaluation.    Currently, he reports noticing that his heart rate is fast, but is otherwise asymptomatic.  He denies significant chest pain or chest discomfort and also states that he does not feel short of breath.  He reports having an episode of symptomatic atrial fibrillation only once or twice per year and is not on chronic anticoagulation.    Medical History:     Past Medical History:   Diagnosis Date    ADD (attention deficit disorder)     Alcohol abuse     Allergic rhinitis 05/21/2009    Anemia 09/2019    Anxiety 05/21/2009    Apnea, sleep     Arrhythmia     afib when hospitaized for infection    Back pain 05/21/2009    Bipolar disorder (HCC)     Bleeding ulcer 05/21/2009    Bleeding ulcer 05/21/2009    History of anemia-now on nexium     Bowel habit changes     constipation related to narcotics    Breath shortness     Congenital anomaly of mitral valve     Patient reports he only has 3 heart valves    Congestive heart failure (HCC)     Daytime sleepiness     Dental disorder     Missing teeth    Depression 05/21/2009    Eczema 05/21/2009    GERD (gastroesophageal reflux disease)     Heart burn     on meds    Heart murmur     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    Hemorrhagic disorder (HCC)     hx of bleeding ulcers    History of bleeding ulcers 02/12/2015    Hypertension 05/03/2021 12/7/23:  Patient states he has Never had high blood pressure.  pt states well controlled on meds    Insomnia 05/21/2009    Intestinal hernia     Migraine 05/21/2009    Morning headache     Obesity, morbid ([facility]) 05/21/2009    Osteomyelitis ([facility]) 10/07/2019    Pain 09/2019    Chronic knee and back pain    Pneumonia     12/4/23:  Last time was around 1 year ago.    Pubic ramus fracture ([facility]) 09/28/2019    Rheumatoid arteritis ([facility]) 09/2019    knee, feet right hand    Sleep apnea 05/21/2009    Did not tolerate cpap, does not use it12/4/23:  Resolved with weight loss.    Snoring      Surgical History:     Past Surgical History:   Procedure Laterality Date    ORIF, ANKLE Right 12/6/2023    Procedure: RIGHT SUBTALAR DISLOCATION OPEN REDUCTION INTERNAL FIXATION, TALONAVICULAR OPEN REDUCTION INTERNAL FIXATION, ACHILLES TENDON LENGTHENING, TRIPLE ARTHRODESIS, RIGHT LOWER EXTREMITY APPLICATION OF MULTIPLANAR EXTERNAL FIXATOR;  Surgeon: Calvin Pryor M.D.;  Location: [facility];  Service: Orthopedics    INCISION AND DRAINAGE ORTHOPEDIC Left 10/22/2023    Procedure: REVISION, BELOW KNEE AMPUTATION;  Surgeon: River Colindres M.D.;  Location: [facility];  Service: Orthopedics    IRRIGATION & DEBRIDEMENT GENERAL Left 10/19/2023    Procedure: IRRIGATION AND DEBRIDEMENT, WOUND;  Surgeon: Jay Roe M.D.;  Location: [facility];  Service: Trauma    KNEE AMPUTATION BELOW Left 03/09/2023    Procedure: AMPUTATION, BELOW KNEE;  Surgeon: Wayne Chambers M.D.;  Location: [facility];  Service: Orthopedics    PB TOTAL KNEE ARTHROPLASTY Right 07/22/2020    Procedure: ARTHROPLASTY, KNEE, TOTAL;  Surgeon: Temo Pires M.D.;  Location: [facility];  Service: Orthopedics    TENDON TRANSFER Right 01/22/2020    Procedure: RIGHT DISTAL ULNA RESECTION AND EXTENSOR TENDON TRANSFER;  Surgeon: Marine Rushing M.D.;  Location: [facility];  Service: Orthopedics    OTHER ORTHOPEDIC SURGERY Right 2020    total right knee  replacement    IRRIGATION & DEBRIDEMENT ORTHO Left 10/09/2019    Procedure: IRRIGATION AND DEBRIDEMENT, WOUND - PELVIC OSTEOMYELITIS;  Surgeon: You Olivares M.D.;  Location: SUR[facility];  Service: Orthopedics    OTHER SURGICAL PROCEDURE  2019    osteomelitis of pelvis cleaned    GASTRIC BYPASS LAPAROSCOPIC  2000    Lucila en y    CHOLECYSTECTOMY  2000    OTHER SURGICAL PROCEDURE      repair of broken penis.  12/4/23:  Patient denies any surgery on penis.     Family History:     Family History   Problem Relation Age of Onset    Hypertension Mother     Arthritis Mother     Depression Mother     Cancer Father         bladder    Schizophrenia Father     Cancer Maternal Grandmother         breast    Heart Disease Maternal Grandmother     Psychiatric Illness Other     Stroke Maternal Aunt     Other Neg Hx         no connective tissue disorders or known aortic disease     Social History:   The patient is a non-smoker    Medications and Allergies:     Current Facility-Administered Medications   Medication Dose Route Frequency Provider Last Rate Last Admin    Pharmacy Consult Request   Other PHARMACY TO DOSE Gil Dalton M.D.        oxyCODONE CR (OxyCONTIN) tablet 10 mg  10 mg Oral Q12HRS Gil Dalton M.D.   10 mg at 12/08/23 1707    oxycodone (Oxy-IR) immediate release tablet 15 mg  15 mg Oral Q6HRS PRN Gil Dalton M.D.        ceFAZolin (Ancef) 2 g in  mL IVPB  2 g Intravenous Q8HRS Gil Dalton M.D.   Stopped at 12/08/23 1740    albuterol (Proventil) 2.5mg/0.5ml nebulizer solution 2.5 mg  2.5 mg Nebulization Q4H PRN (RT) Gil Dalton M.D.        sodium bicarbonate 75 mEq in 1/2 NS 1,000 mL infusion  75 mEq Intravenous Continuous Gil Dalton M.D. 125 mL/hr at 12/08/23 1819 75 mEq at 12/08/23 1819    DEXTROSE 50 % IV SOLN             heparin infusion 25,000 units in 500 mL 0.45% NACL  0-30 Units/kg/hr Intravenous Continuous Gil Dalton M.D. 34.2 mL/hr at 12/08/23 2214 18 Units/kg/hr at  "12/08/23 2214    heparin injection 3,800 Units  40 Units/kg Intravenous PRN Gil Dalton M.D.        dextrose 5% infusion   Intravenous Continuous MARTÍN Burgos 30 mL/hr at 12/08/23 2000 New Bag at 12/08/23 2000    amiodarone (Nexterone) 360 mg/200 mL infusion  1 mg/min Intravenous Once Romaine Clements M.D. 33.3 mL/hr at 12/08/23 2043 1 mg/min at 12/08/23 2043    Followed by    [START ON 12/9/2023] amiodarone (Nexterone) 360 mg/200 mL infusion  0.5 mg/min Intravenous Continuous Romaine Clements M.D.        dakins 0.125% (1/4 strength) topical soln   Topical BID Calvin Pryor M.D.   1 mL at 12/08/23 1711    omeprazole (PriLOSEC) capsule 20 mg  20 mg Oral DAILY Gil Dalton M.D.   20 mg at 12/08/23 0445    [Held by provider] furosemide (Lasix) injection 20 mg  20 mg Intravenous BID DIURETIC Gil Dalton M.D.   20 mg at 12/08/23 0445    albuterol inhaler 2 Puff  2 Puff Inhalation Q6HRS PRN Torsten Grove M.D.        [Held by provider] DULoxetine (Cymbalta) capsule 60 mg  60 mg Oral DAILY Torsten Grove M.D.   60 mg at 12/08/23 0502    vitamin D3 (Cholecalciferol) tablet 1,000 Units  1,000 Units Oral DAILY Torsten Grove M.D.   1,000 Units at 12/08/23 0445    HYDROmorphone (Dilaudid) injection 1 mg  1 mg Intravenous Q4HRS PRN Torsten Grove M.D.   1 mg at 12/08/23 2221       Allergies   Allergen Reactions    Nsaids      Bleeding, \"I had a bleeding ulcer\"    Diphenhydramine Unspecified     \"I get agitated\"    Mirtazapine Unspecified     I had a hard time waking up, lacking mental focus.     Penicillins      \"Had some dizziness\"  Tolerated Unasyn 10/2019  Tolerated Zosyn 02/2023    Promethazine Unspecified     \"I get very agitated\"       Review of Systems:   A pertinent review of systems was performed and was unremarkable except as per HPI above.    Vital Signs:   BP 92/58   Pulse (!) 117   Temp 36.7 °C (98.1 °F)   Resp 20   Ht 1.803 m (5' 11\")   Wt 95.1 kg (209 lb 10.5 " oz)   SpO2 96%   BMI 29.24 kg/m²   Vitals:    12/08/23 2047 12/08/23 2056 12/08/23 2109 12/08/23 2124   BP: 106/54 (!) 121/92 112/57 92/58   Pulse: (!) 126 (!) 120 (!) 116 (!) 117   Resp: 18 20 (!) 22 20   Temp:       TempSrc:       SpO2: 98% 97% 95% 96%   Weight:       Height:         Body mass index is 29.24 kg/m².  Oxygen Therapy:  Pulse Oximetry: 96 %, O2 (LPM): 2, O2 Delivery Device: Nasal Cannula    Physical Examination:   General: Chronically ill-appearing, but in no acute distress  Eyes: Extraocular movements intact, anicteric  HENT: Neck full range of motion, jugular venous distension appears to be at jawline, although examination is difficult due to atrial fibrillation with rapid ventricular response  Pulmonary: Normal respiratory effort, on supplemental oxygen.  Cardiovascular: Irregular, tachycardic.  Gastrointestinal: Soft, non-tender, non-distended  Extremities: Right lower extremity wrapped, left lower extremity BKA, unable to assess for edema.  Neurological: Alert and oriented, no gross focal motor deficits  Psychiatric: Appropriate affect, normal judgment    Laboratories:   Estimated Creatinine Clearance: 53.7 mL/min (A) (by C-G formula based on SCr of 2 mg/dL (H)).  Recent Labs     12/07/23 0804 12/08/23 0322 12/08/23 1422 12/08/23 1939   CREATININE 1.81* 2.01* 2.02* 2.00*   BUN 24* 32* 36* 37*   POTASSIUM 5.4 5.6* 5.8* 5.6*   SODIUM 127* 126* 127* 126*   CALCIUM 8.2* 8.3* 8.1* 7.9*   MAGNESIUM 2.1  --   --  2.2   CO2 20 18* 17* 17*   ALBUMIN 3.4 3.3  --  3.2     Recent Labs     12/08/23 0322 12/08/23 1422 12/08/23 1939   GLUCOSE 77 59* 82     Recent Labs     12/08/23 1939   ASTSGOT 586*   ALTSGPT 127*   ALKPHOSPHAT 253*   INR 2.39*     Recent Labs     12/07/23  0804 12/08/23 0322 12/08/23 1939   WBC 10.3 17.3* 15.4*   HEMOGLOBIN 8.9* 9.1* 8.9*   PLATELETCT 236 205 195     Recent Labs     12/07/23  0804 12/08/23 0322 12/08/23 1939   TROPONINT  --   --  30*   NTPROBNP 80962* 77432*  --       Lab Results   Component Value Date/Time    LDL 48 01/28/2021 1530    LDL 37 05/01/2019 1511    LDL 35 09/07/2018 1059     Lab Results   Component Value Date/Time    HDL 68 01/28/2021 1530    HDL 64 05/01/2019 1511    HDL 66 09/07/2018 1059       Lab Results   Component Value Date/Time    TRIGLYCERIDE 72 01/28/2021 1530    TRIGLYCERIDE 64 05/01/2019 1511    TRIGLYCERIDE 35 09/07/2018 1059       Lab Results   Component Value Date/Time    CHOLSTRLTOT 130 01/28/2021 1530    CHOLSTRLTOT 114 05/01/2019 1511    CHOLSTRLTOT 108 09/07/2018 1059     Studies:   Echocardiography  TTE (10/23/2023)  Normal left ventricular systolic function.  The left ventricular ejection fraction is visually estimated to be 55%.  Mild mitral regurgitation.  Heavily calcified aortic valve, possibly a bicuspid.  Moderate aortic valve stenosis Vmax is 3.57 m/s.  Aortic valve area calculated from the continuity equation is 1.2 cm2.  Vmax is 3.57 m/s. Transvalvular gradients are - Peak: 50 mmHg,  Mean: 28 mmHg.   Moderate aortic insufficiency.  Mild tricuspid regurgitation.  Estimated right ventricular systolic pressure is 45 mmHg.    Stress Testing  SPECT MPI (1/8/2019)   1. Large inferior wall defect which could be artifact or infarct.   2. No definite reversible defect.   3. Global hypokinesis.   4. EF 35%    X-ray/CT/MRI  CXR (12/8/2023)  1.  Small right pleural effusion with adjacent airspace disease.  2.  Worsening interstitial infiltrates and/or edema.        Romaine Clements MD, [facility]  Interventional Cardiology  [facility]  [address] [address]  Phone: [phone]  Fax: [phone]

## 2023-12-09 NOTE — PROGRESS NOTES
"Interval History:  48-year-old male with a history of BAV with moderate AAS, PAF, MONICA, rheumatoid arthritis and severe PAD with prior left BKA.  Underwent ankle surgery 12/6/2023 with postoperative KATERYNA and hyperkalemia with reduction of his chronic diuresis postoperatively.  Cardiology consultation was obtained 12/8 for new AF RVR with aberrancy.  12/9: Amiodarone drip overnight, remains A-fib RVR with intermittent hypotension.  Was administered 2 L of IV fluids overnight and initiated on Levophed.  Has converted to sinus rhythm.  [place]-Aung catheter placed.  Progressive leukocytosis, markedly elevated procalcitonin and lactic acidosis with multiorgan system dysfunction.    Physical Exam   Blood pressure 102/54, pulse 89, temperature 37 °C (98.6 °F), temperature source Bladder, resp. rate (!) 22, height 1.803 m (5' 11\"), weight 108 kg (237 lb 7 oz), SpO2 97 %.    Constitutional: Critically ill-appearing.  Appears well-developed.   HENT: Normocephalic and atraumatic. No scleral icterus.   Neck: No JVD present.   Cardiovascular: Normal rate.   Pulmonary/Chest: Normal chest rise.  Abdominal: S/NT/ND BS+   Musculoskeletal: Exhibits no edema. Pulses present.  Skin: Skin is warm and dry.     ROS: As HPI other reviewed and negative       Intake/Output Summary (Last 24 hours) at 12/9/2023 1238  Last data filed at 12/9/2023 1200  Gross per 24 hour   Intake 7318.67 ml   Output 695 ml   Net 6623.67 ml       Recent Labs     12/08/23 2323 12/09/23  0445 12/09/23  0825   WBC 15.6* 15.7* 17.8*   RBC 3.15* 3.37* 3.26*   HEMOGLOBIN 8.7* 9.3* 9.1*   HEMATOCRIT 28.3* 29.7* 28.7*   MCV 89.8 88.1 88.0   MCH 27.6 27.6 27.9   MCHC 30.7* 31.3* 31.7*   RDW 52.3* 51.0* 51.8*   PLATELETCT 179 211 200   MPV 10.5 10.2 10.6     Recent Labs     12/08/23 2323 12/09/23  0445 12/09/23  0825   SODIUM 123* 125* 125*   POTASSIUM 4.9 5.6* 5.4   CHLORIDE 91* 91* 90*   CO2 17* 12* 13*   GLUCOSE 134* 70 55*   BUN 38* 36* 39*   CREATININE 1.92* 1.87* 1.91* "   CALCIUM 7.4* 7.4* 7.4*     Recent Labs     12/08/23 1939   APTT 34.6   INR 2.39*                   No current facility-administered medications on file prior to encounter.     Current Outpatient Medications on File Prior to Encounter   Medication Sig Dispense Refill    MOVANTIK 25 MG Tab Take 25 mg by mouth every day.      multivitamin Tab Take 1 Tablet by mouth every day.      vitamin D3 (CHOLECALCIFEROL) 1000 Unit (25 mcg) Tab Take 1 Tablet by mouth every day. 30 Tablet 2    acetaminophen (TYLENOL) 500 MG Tab Take 2 Tablets by mouth in the morning, at noon, and at bedtime. 180 Tablet 2    Adalimumab (HUMIRA PEN) 40 MG/0.4ML Pen-injector Kit INJECT 40MG SUBCUTANEOUSLY  EVERY 2 WEEKS (Patient taking differently: Inject 40 mg into the shoulder, thigh, or buttocks every 14 days.) 2 Each 4    albuterol 108 (90 Base) MCG/ACT Aero Soln inhalation aerosol Inhale 2 Puffs every 6 hours as needed for Shortness of Breath. 8.5 g 0    Naloxone (NARCAN) 4 MG/0.1ML Liquid One spray in one nostril for overdose and call 911. (Patient taking differently: Administer 1 Spray into affected nostril(S) one time. One spray in one nostril for overdose and call 911.) 1 Package 0       Current Facility-Administered Medications   Medication Dose Frequency Provider Last Rate Last Admin    norepinephrine (Levophed) 8 mg in 250 mL NS infusion (premix)  0-1 mcg/kg/min (Ideal) Continuous Jeremy M Gonda, M.D. 19.8 mL/hr at 12/09/23 1102 0.14 mcg/kg/min at 12/09/23 1102    Linezolid (Zyvox) premix 600 mg  600 mg Q12HRS Danny Dobbs M.D.   Stopped at 12/09/23 0938    DOBUTamine (Dobutrex) 1 mg/1 mL premix infusion  0-20 mcg/kg/min (Ideal) Continuous Danny Dobbs M.D. 11.3 mL/hr at 12/09/23 0745 2.5 mcg/kg/min at 12/09/23 0745    HYDROmorphone (Dilaudid) injection 1 mg  1 mg Q4HRS PRN Danny Dobbs M.D.        senna-docusate (Pericolace Or Senokot S) 8.6-50 MG per tablet 2 Tablet  2 Tablet BID Danny Dobbs M.D.        And     polyethylene glycol/lytes (Miralax) Packet 1 Packet  1 Packet QDAY PRN Danny Dobbs M.D.        And    magnesium hydroxide (Milk Of Magnesia) suspension 30 mL  30 mL QDAY PRN Danny Dobbs M.D.        And    bisacodyl (Dulcolax) suppository 10 mg  10 mg QDAY PRN Danny Dobbs M.D.        heparin injection 5,000 Units  5,000 Units Q8HRS Danny Dobbs M.D.        hydrocortisone sodium succinate PF (Solu-CORTEF) 100 MG injection 50 mg  50 mg Q6HRS Danny Dobbs M.D.   50 mg at 12/09/23 1123    thiamine (B-1) IVPB 100 mg in 100 mL D5W (premix)  100 mg DAILY Danny Dobbs M.D.   Stopped at 12/09/23 1157    oxycodone (Oxy-IR) immediate release tablet 15 mg  15 mg Q6HRS PRN Gil Dalton M.D.        albuterol (Proventil) 2.5mg/0.5ml nebulizer solution 2.5 mg  2.5 mg Q4H PRN (RT) Gil Dalton M.D.        amiodarone (Nexterone) 360 mg/200 mL infusion  0.5 mg/min Continuous Danny Dobbs M.D. 16.7 mL/hr at 12/09/23 1123 0.5 mg/min at 12/09/23 1123    cefTRIAXone (Rocephin) syringe 2,000 mg  2,000 mg Q24HRS Natalie Watters, A.P.R.N.   2,000 mg at 12/09/23 0541    dakins 0.125% (1/4 strength) topical soln   BID Calvin Pryor M.D.   2 mL at 12/09/23 0900    omeprazole (PriLOSEC) capsule 20 mg  20 mg DAILY Gil Dalton M.D.   20 mg at 12/09/23 0537    DULoxetine (Cymbalta) capsule 60 mg  60 mg DAILY Torsten Grove M.D.   60 mg at 12/09/23 0538   Last reviewed on 12/6/2023  1:32 PM by Radha Perez RCRISTOFER   Medications reviewed    Imaging reviewed    ECHO(10/20/2023):  Prior study on 8/12/2023, compared to the report of the prior study,   there has been no significant change.   Normal left ventricular systolic function.  The left ventricular ejection fraction is visually estimated to be 55%.  Mild mitral regurgitation.  Heavily calcified aortic valve, possibly a bicuspid.  Moderate aortic valve stenosis Vmax is 3.57 m/s.  Aortic valve area calculated from the continuity equation is 1.2  cm2.  Vmax is 3.57 m/s. Transvalvular gradients are - Peak: 50 mmHg,  Mean:   28 mmHg.   Moderate aortic insufficiency.  Mild tricuspid regurgitation.  Estimated right ventricular systolic pressure is 45 mmHg.    Impressions:  1.  Septic shock  2.  A-fib RVR with aberrancy mediated by hyperkalemia, now sinus rhythm  3.  Multiorgan system dysfunction  4.  Lactic acidosis  5.  Normocytic anemia  6.  Acute kidney injury  7.  Advanced peripheral arterial disease  8.  Borderline troponin elevation not consistent with acute coronary syndrome  9.  Hyponatremia  10.  Hyperkalemia, recently symptomatic  11. Moderate aortic stenosis    Recommendations:  Patient remains critically ill  Continue amiodarone gtt. for now, he is likely to go back into atrial fibrillation.  Atrial fibrillation is not the  of his shock as he remains in shock now in sinus rhythm.  Oral anticoagulation would be indicated once he recovers, continue heparin GTT for now.  Hemodynamically guided supportive measures to continue  Continue search for source of sepsis with attempts to obtain source control.  Echocardiogram now that he is in sinus rhythm.    Critical Care Time:  35 minutes  Date of Service:   CPT: 53348  No time overlap  Time excludes procedures  Discussed at bedside with RN/Team/Family      Discussed with primary physician and bedside nursing.

## 2023-12-09 NOTE — PROGRESS NOTES
Dr. Dobbs at bedside, discussed labs and current hemodynamics. Also updated that patient blood glucose was 71, repeat of 84 after patient consumed some soda.     Received final update that patient will not be going to the OR for BKA revision.

## 2023-12-09 NOTE — PROCEDURES
Arterial Line Placement Procedure                  Date: 12/09/23    Indication: cardiovascular instability    Consent: The patient provided verbal consent for this procedure.    Procedure: The skin over the right radial artery was prepped and draped in the usual sterile fashion.  Local anesthesia was administered.  Using bedside ultrasound guidance, a guide wire was inserted into the artery via a guide needle, and then the arterial line catheter was inserted into the vessel over the guide wire.  The guide wire was then removed and the transducer set was then attached and securely fastened to the skin with sutures and dressed in a sterile fashion.  Waveforms on the monitor were observed and found to be adequate.  The patient had good distal perfusion after the procedure.    The patient tolerated the procedure well.     Complications: None    Supervising physician: Dr. Jeremy Gonda

## 2023-12-09 NOTE — PROGRESS NOTES
4 Eyes Skin Assessment Completed by Bc RN and Antonio RN.    Head WDL  Ears WDL  Nose WDL  Mouth WDL  Neck WDL  Breast/Chest WDL  Shoulder Blades WDL  Spine WDL  (R) Arm/Elbow/Hand WDL  (L) Arm/Elbow/Hand WDL  Abdomen WDL  Groin WDL  Scrotum/Coccyx/Buttocks Redness and Discoloration  (R) Leg Redness, Swelling, Abrasion, and Incision  (L) Leg Redness, Bruising, Swelling, and Incision  (R) Heel/Foot/Toe Redness, Discoloration, and Swelling  (L) Heel/Foot/Toe WDL          Devices In Places ECG, Tele Box, Blood Pressure Cuff, Pulse Ox, Arterial Line, Central Line, and Nasal Cannula      Interventions In Place Sacral Mepilex, Waffle Overlay, Pillows, Q2 Turns, and Low Air Loss Mattress    Possible Skin Injury Yes    Pictures Uploaded Into Epic Yes  Wound Consult Placed Yes  RN Wound Prevention Protocol Ordered Yes

## 2023-12-09 NOTE — PROGRESS NOTES
"Critical Care Progress Note    Date of admission  12/6/2023    Chief Complaint  \"48 y.o. male who presented 12/6/2023 with a past medical history significant for atrial fibrillation, bipolar disorder, obesity, rheumatoid arthritis, sleep apnea and grade 1 diastolic dysfunction with moderate aortic stenosis and pulmonary artery hypertension (RVSP 45 mmHg).  He was admitted to the hospital on December 6 for progressive flatfoot deformity/superficial wound needing open reduction and internal fixation of the right subtalar dislocation.  He was taken to the operating room on day of admission for open reduction of the dislocation as well as right foot triple arthrodeses, right Achilles tendon lengthening and application of multiplanar external fixator.  He was admitted to the hospital postoperatively by the hospitalist service to help manage his coexisting medical disease.  He was started on Lasix and remained on Humira for his rheumatoid arthritis.  During his hospital stay, patient started develop increasing oxygen demand and noted to be going into acute renal failure with elevated liver enzymes.  His IV Lasix was held and he was given IV fluids.  He was started on a bicarbonate drip when his potassium started to rise.  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  Surgery plan to take him back to the OR for I&D of his left BKA and he was started on sepsis protocol.  This evening, patient went into A-fib with RVR.  He was loaded with IV amiodarone and cardiology was consulted recommending diuresis and ongoing amiodarone and a repeat echocardiogram once his rate is better controlled.  I was called this evening however as patient started to develop worsening hypotension for which she was treated with 2 L of crystalloid but remained hypotensive.  At the time my evaluation, I performed a bedside POCUS showing dilated and poorly " "functioning right and left ventricles with a dilated IVC.  I started him on a norepinephrine drip and transferred him to the intensive care unit where a [place]-Aung catheter was placed to help guide cardiac support. \"    Hospital Course  12/9 admitted to the ICU for cardiogenic shock and organ dysfunction s/p IVF    Interval Problem Update  Reviewed last 24 hour events:  Somnolent but easily awakens   NC oxygen  DBT and levo  SR-ST  UOP has picked up since diuretics and optimizing CI  Holding heparin gtt for frankly bloody sputum  Empiric ceftriaxone + linezolid    Review of Systems  Review of Systems   Unable to perform ROS: Critical illness        Vital Signs for last 24 hours   Temp:  [36.5 °C (97.7 °F)-37.7 °C (99.9 °F)] 37 °C (98.6 °F)  Pulse:  [] 87  Resp:  [14-32] 19  BP: ()/(45-92) 106/57  SpO2:  [2 %-100 %] 97 %    Hemodynamic parameters for last 24 hours  CVP:  [21 MM HG-26 MM HG] 21 MM HG  PCWP:  [25 MM HG-32 MM HG] 27 MM HG  CO:  [2.9-5.17] 4.73  CI:  [1.3-2.37] 2.17    Respiratory Information for the last 24 hours       Physical Exam   Physical Exam  Vitals and nursing note reviewed.   Constitutional:       Appearance: He is ill-appearing.   HENT:      Head: Normocephalic and atraumatic.      Right Ear: External ear normal.      Left Ear: External ear normal.      Nose: Nose normal.      Mouth/Throat:      Mouth: Mucous membranes are moist.   Eyes:      Pupils: Pupils are equal, round, and reactive to light.   Cardiovascular:      Rate and Rhythm: Normal rate.      Pulses: Normal pulses.   Pulmonary:      Effort: Pulmonary effort is normal. No respiratory distress.   Abdominal:      General: Abdomen is flat. There is no distension.      Palpations: Abdomen is soft.      Tenderness: There is no abdominal tenderness. There is no guarding or rebound.   Musculoskeletal:      Comments: L amputation site is dehisced but does not appear overtly infected; no crepitus or induration. R foot dressing " c/d/i   Skin:     General: Skin is warm and dry.      Capillary Refill: Capillary refill takes 2 to 3 seconds.   Neurological:      General: No focal deficit present.      Mental Status: He is alert.      Motor: No weakness.   Psychiatric:         Mood and Affect: Mood normal.         Medications  Current Facility-Administered Medications   Medication Dose Route Frequency Provider Last Rate Last Admin    norepinephrine (Levophed) 8 mg in 250 mL NS infusion (premix)  0-1 mcg/kg/min (Ideal) Intravenous Continuous Jeremy M Gonda, M.D. 19.8 mL/hr at 12/09/23 1102 0.14 mcg/kg/min at 12/09/23 1102    Linezolid (Zyvox) premix 600 mg  600 mg Intravenous Q12HRS Danny Dobbs M.D.   Stopped at 12/09/23 0938    DOBUTamine (Dobutrex) 1 mg/1 mL premix infusion  0-20 mcg/kg/min (Ideal) Intravenous Continuous Danny Dobbs M.D. 11.3 mL/hr at 12/09/23 0745 2.5 mcg/kg/min at 12/09/23 0745    HYDROmorphone (Dilaudid) injection 1 mg  1 mg Intravenous Q4HRS PRN Danny Dobbs M.D.        senna-docusate (Pericolace Or Senokot S) 8.6-50 MG per tablet 2 Tablet  2 Tablet Oral BID Danny Dobbs M.D.        And    polyethylene glycol/lytes (Miralax) Packet 1 Packet  1 Packet Oral QDAY PRN Danny Dobbs M.D.        And    magnesium hydroxide (Milk Of Magnesia) suspension 30 mL  30 mL Oral QDAY PRN Danny Dobbs M.D.        And    bisacodyl (Dulcolax) suppository 10 mg  10 mg Rectal QDAY PRN Danny Dobbs M.D.        heparin injection 5,000 Units  5,000 Units Subcutaneous Q8HRS Danny Dobbs M.D.        hydrocortisone sodium succinate PF (Solu-CORTEF) 100 MG injection 50 mg  50 mg Intravenous Q6HRS Danny Dobbs M.D.   50 mg at 12/09/23 1123    thiamine (B-1) IVPB 100 mg in 100 mL D5W (premix)  100 mg Intravenous DAILY Danny Dobbs M.D.   Stopped at 12/09/23 1157    furosemide (Lasix) injection 60 mg  60 mg Intravenous Q8HRS Danny Dobbs M.D.   60 mg at 12/09/23 1529    oxycodone (Oxy-IR) immediate  release tablet 15 mg  15 mg Oral Q6HRS PRN Gil Dalton M.D.        albuterol (Proventil) 2.5mg/0.5ml nebulizer solution 2.5 mg  2.5 mg Nebulization Q4H PRN (RT) Gil Dalton M.D.        amiodarone (Nexterone) 360 mg/200 mL infusion  0.5 mg/min Intravenous Continuous Danny Dobbs M.D. 16.7 mL/hr at 12/09/23 1123 0.5 mg/min at 12/09/23 1123    cefTRIAXone (Rocephin) syringe 2,000 mg  2,000 mg Intravenous Q24HRS Natalie Watters A.P.R.N.   2,000 mg at 12/09/23 0541    dakins 0.125% (1/4 strength) topical soln   Topical BID Calvin Pryor M.D.   2 mL at 12/09/23 0900    omeprazole (PriLOSEC) capsule 20 mg  20 mg Oral DAILY Gil Dalton M.D.   20 mg at 12/09/23 0537    DULoxetine (Cymbalta) capsule 60 mg  60 mg Oral DAILY Torsten Grove M.D.   60 mg at 12/09/23 0538       Fluids    Intake/Output Summary (Last 24 hours) at 12/9/2023 1551  Last data filed at 12/9/2023 1400  Gross per 24 hour   Intake 6388.01 ml   Output 1295 ml   Net 5093.01 ml       Laboratory          Recent Labs     12/07/23  0804 12/08/23  0322 12/08/23  1939 12/08/23  2323 12/09/23  0445 12/09/23  0825 12/09/23  1234   SODIUM 127*   < > 126* 123* 125* 125* 125*   POTASSIUM 5.4   < > 5.6* 4.9 5.6* 5.4 5.0   CHLORIDE 94*   < > 91* 91* 91* 90* 92*   CO2 20   < > 17* 17* 12* 13* 14*   BUN 24*   < > 37* 38* 36* 39* 39*   CREATININE 1.81*   < > 2.00* 1.92* 1.87* 1.91* 1.82*   MAGNESIUM 2.1  --  2.2  --  2.2  --   --    PHOSPHORUS 5.8*   < > 5.6* 5.1* 5.7*  --   --    CALCIUM 8.2*   < > 7.9* 7.4* 7.4* 7.4* 7.4*    < > = values in this interval not displayed.     Recent Labs     12/09/23 0445 12/09/23 0825 12/09/23  1234   ALTSGPT 161* 191* 230*   ASTSGOT 660* 852* 1029*   ALKPHOSPHAT 256* 258* 248*   TBILIRUBIN 2.3* 2.5* 2.5*   GLUCOSE 70 55* 84     Recent Labs     12/09/23 0445 12/09/23 0825 12/09/23  1234   WBC 15.7* 17.8* 19.1*   NEUTSPOLYS 83.70*  --   --    LYMPHOCYTES 9.30*  --   --    MONOCYTES 6.10  --   --    EOSINOPHILS  0.10  --   --    BASOPHILS 0.10  --   --    ASTSGOT 660* 852* 1029*   ALTSGPT 161* 191* 230*   ALKPHOSPHAT 256* 258* 248*   TBILIRUBIN 2.3* 2.5* 2.5*     Recent Labs     12/08/23  1939 12/08/23  2323 12/09/23  0445 12/09/23  0825 12/09/23  1234   RBC 3.27*   < > 3.37* 3.26* 3.25*   HEMOGLOBIN 8.9*   < > 9.3* 9.1* 9.0*   HEMATOCRIT 28.4*   < > 29.7* 28.7* 28.1*   PLATELETCT 195   < > 211 200 187   PROTHROMBTM 26.4*  --   --   --   --    APTT 34.6  --   --   --   --    INR 2.39*  --   --   --   --     < > = values in this interval not displayed.       Imaging  X-Ray:  I have personally reviewed the images and compared with prior images.    Assessment/Plan  * Cardiogenic shock (HCC)  Assessment & Plan  Admitted to the ICU 12/09/23 with suspected BiV failure    Lanse guided therapy  DBT for CI > 2.2  Diuretics to achieve euvolemia  Levo for MAP > 65    Acute metabolic encephalopathy  Assessment & Plan  Secondary to shock  Improving   Aspiration precautions  Delirium bundle     Transaminitis  Assessment & Plan  Secondary to shock liver    Maintain adequate perfusion  RUQ US reassuring  Hepatitis panel  Avoid hepatotoxins  Monitor LFTs and synthetic function while maintaining perfusion to the liver    Immunosuppression (HCC)  Assessment & Plan  Holding Humira for now given infected left lower extremity    Dislocation of right subtalar joint  Assessment & Plan  S/p ORIF of right subtalar dislocation as well as application of external fixator 12/6  Ortho following  PT/OT    Hx of gastric bypass- (present on admission)  Assessment & Plan  Encourage ongoing weight loss management    Acute renal failure (ARF) (HCC)- (present on admission)  Assessment & Plan  Secondary to cardiogenic shock/ATN   UOP improving with diuretics     Strict I/Os  Renally dosed medications  Avoid nephrotoxic agents as able  Trend     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)- (present on admission)  Assessment & Plan  Last echo from  October 20, 2023 with EF 55%, grade 1 diastolic dysfunction with moderate AAS/AI and RVSP of 45 mmHg --> now in biventricular failure as seen on POCUS per Dr. Gonda 12/9    Repeat ECHO pending  [place] guided therapy  Unable to tolerate GDMT    Cardiology following    S/P BKA (below knee amputation) unilateral, left (HCC)- (present on admission)  Assessment & Plan  With infected stump  Continue antibiotics, follow-up on cultures  Plan for I&D of left BKA when stabilized  Wound care    Hyponatremia- (present on admission)  Assessment & Plan  Secondary to heart failure  Monitor with above therapies    Chronic pain- (present on admission)  Assessment & Plan  Continue as needed analgesics    Atrial fibrillation with RVR (HCC)- (present on admission)  Assessment & Plan  History of A-fib, went into AVR 12/8, converted back to normal sinus rhythm 12/9    Amiodarone   Optimize electrolytes, continuous telemetry monitoring  Formal ECHO  Heparin drip stopped for hemoptysis     Sepsis (HCC)- (present on admission)  Assessment & Plan  This is Septic shock Not present on admission  SIRS criteria identified on my evaluation include: Tachycardia, with heart rate greater than 90 BPM, Tachypnea, with respirations greater than 20 per minute, and Leukocytosis, with WBC greater than 12,000  Clinical indicators of end organ dysfunction include Hypotension with systolic blood pressure less than 90 or MAP less than 65  Indicators of septic shock include: Sepsis present and persistent hypotension despite fluid resuscitation   Sources is: Soft tissue  S/p sepsis protocol initiated 12/8  Crystalloid Fluid Administration: Resuscitation volume of 2 L ordered. Reason that resuscitation volume of less than 30ml/kg was ordered concern for causing harm given CHF  IV antibiotics as appropriate for source of sepsis  Reassessment: I have reassessed the patient's hemodynamic status  Monitor lactic acid, urine output, vital signs closely for adequacy of  resuscitation    Linezolid + ceftriaxone  Wound culture staph and strep  Follow cultures  MAP > 65 using vasopressors     Nonrheumatic aortic valve stenosis- (present on admission)  Assessment & Plan  Moderate on last echo in October 2023  Repeat ECHO  Maintain euvolemia     Rheumatoid arthritis (HCC)- (present on admission)  Assessment & Plan  As needed analgesics  Holding immunosuppressants for now in the setting of infection         VTE:  Heparin  Ulcer: Not Indicated  Lines: Central Line  Ongoing indication addressed, Arterial Line  Ongoing indication addressed, and Rivera Catheter  Ongoing indication addressed    I have performed a physical exam and reviewed and updated ROS and Plan today (12/9/2023). In review of yesterday's note (12/8/2023), there are no changes except as documented above.     Discussed patient condition and risk of morbidity and/or mortality with RN, RT, Pharmacy, Charge nurse / hot rounds, and Patient  The patient remains critically ill.  Critical care time = 94 minutes in directly providing and coordinating critical care and extensive data review.  No time overlap and excludes procedures.

## 2023-12-09 NOTE — PROGRESS NOTES
NOC HOSPITALIST CROSS COVER    Notified by RN regarding hypotension and A-fib with RVR.  Patient was seen evaluated bedside on amnio drip heart rates between 120 and 140.  Patient initially fluid responsive following 500 cc NS bolus blood pressures 90s to 100s systolic.  Patient had been rapid response earlier for A-fib with RVR and cardiology has been consulted.  Lactic acid was elevated at that time 4.6 procalcitonin is 10.  Ultimately patient received 2 L IV crystalloid.  Despite aggressive fluid resuscitation patient remained hypotensive.  Contacted critical care physician Dr. Gonda who evaluated patient bedside.  Bedside point-of-care echocardiogram demonstrated global hypokinesis.  Patient was transferred to ICU for concern of cardiogenic shock and placed on Levophed drip.       A/P:  # Cardiogenic shock  -Patient transferred to ICU for pressor support        -----------------------------------------------------------------------------------------------------------    Electronically signed by:  SIDDHARTH Peña PA-C  Hospitalist Services

## 2023-12-09 NOTE — ASSESSMENT & PLAN NOTE
Not POA  Resolved   - Linezolid + ceftriaxone changed to Cefepime  Patient underwent I&D on 12/17, culture growing Serratia, ID consulted, continue cefepime, will need 6 weeks of IV antibiotics while him being in the hospital, will switch to invanz upon dc

## 2023-12-09 NOTE — ASSESSMENT & PLAN NOTE
Infected left BKA stump  Wound culture with Serratia species  Continue cefepime  S/P I&D on 12/16  Wound care    Staples/sutures out 2-3 weeks post op from 12/16 (~Remove week of Jan 1st)

## 2023-12-09 NOTE — PROGRESS NOTES
0817 monitor tech called that QRS is now wide. Updated Dr. Dobbs, stat EKG ordered. QRS returned back to normal on bedside monitor at 0825. EKG at bedside at 0828.

## 2023-12-09 NOTE — PROGRESS NOTES
Monitor check called, patient in SVTs in 300s. Patient not complaining of symptoms, presenting with calm demeanor. Rapid response called. Hospitalist notified. Patient sustaining 140-170s. Code cart at bedside, zoll placed on patient, rapid and hospitalist at bedside.

## 2023-12-09 NOTE — PROGRESS NOTES
Rapid Response Summary     Rapid response called at 1910 for: Tachycardia     VS: Tachycardia  (See Vitals Flowsheet)  Additional info: PT was in rapid AF HR in the 140s-160s. BP was stable in 100s to 90s. EKG was done and sent to Dr. Clements with cardiology. Dr. Clements order an amio gtt. EKG showed Afib with widen QRS. PT had no chest pain throughout the rapid nor increase in 02 demand. End of rapid HR was 100s-130s.   MD Paged: Natalie WESTFALL NP who as at bedside throughout the rapid.   Interventions:    Imaging/Tests: Chest X-ray and EKG    Labs: CBC, CMP, Lactic Acid, Troponin, APTT, PT, INR, and Magnesium   Medications: Fluid Amio bolus given and Amio gtt started. Heparin gtt ordered.    Other: PIV started   Disposition: Improved with rapid response team interventions. Primary RN updated on plan of care. Patient transferred to telemetry.

## 2023-12-09 NOTE — PROGRESS NOTES
Adolfo from Lab called with critical result of lactic 4.6 at 2017. Critical lab result read back to Adolfo.   Rapid team and hospitalist notified of critical lab result at 2018.

## 2023-12-09 NOTE — CARE PLAN
The patient is Unstable - High likelihood or risk of patient condition declining or worsening    Shift Goals  Clinical Goals: hemodynamic stability  Patient Goals: rest  Family Goals: elias    Progress made toward(s) clinical / shift goals:  [place] catheter places for close hemodynamic monitoring. Patient placed on norepi and dobutamine gtt. MAP goal >65. Lasix gtt to control venous pressures. Patient denied pain through the shift.      Problem: Pain - Standard  Goal: Alleviation of pain or a reduction in pain to the patient’s comfort goal  Outcome: Progressing     Problem: Skin Integrity  Goal: Skin integrity is maintained or improved  Outcome: Progressing     Problem: Hemodynamics  Goal: Patient's hemodynamics, fluid balance and neurologic status will be stable or improve  Outcome: Progressing

## 2023-12-09 NOTE — ASSESSMENT & PLAN NOTE
Severe aortic stenosis due to bicuspid aortic valve  Mean valve gradient 48 mmHg  Associated moderate to severe aortic insufficiency  Continue spironolactone, 25 mg daily  Continue torsemide, 20 mg daily    Will likely need TAVR at some point per cardiology

## 2023-12-09 NOTE — PROGRESS NOTES
Critical of procalcitonin of 10.30. Dr Andrzej Peña at bedside, with rapid team. Aware of critical

## 2023-12-09 NOTE — PROGRESS NOTES
Tech from Lab called with critical result of AST 1029 at 1325. Critical lab result read back to tech.   Dr. Dobbs notified of critical lab result at 1328.  Critical lab result read back by Dr. Dobbs.

## 2023-12-09 NOTE — ASSESSMENT & PLAN NOTE
Due to ischemic hepatopathy from heart failure  Trend liver enzymes and synthetic function  Avoid hepatotoxins  Improving

## 2023-12-09 NOTE — PROGRESS NOTES
Lab called with critical lactic of 7.7.  MD Gonda notified, read back result.  Continue Q4 hour lactics, this RN to run new cardiac numbers.

## 2023-12-10 PROBLEM — Q23.1 AORTIC STENOSIS DUE TO BICUSPID AORTIC VALVE: Status: ACTIVE | Noted: 2019-09-12

## 2023-12-10 PROBLEM — G93.41 ACUTE METABOLIC ENCEPHALOPATHY: Status: RESOLVED | Noted: 2023-01-01 | Resolved: 2023-01-01

## 2023-12-10 PROBLEM — R79.89 ELEVATED LFTS: Status: ACTIVE | Noted: 2023-01-01

## 2023-12-10 PROBLEM — Q23.0 AORTIC STENOSIS DUE TO BICUSPID AORTIC VALVE: Status: ACTIVE | Noted: 2019-09-12

## 2023-12-10 PROBLEM — I27.20 PULMONARY HYPERTENSION (HCC): Status: ACTIVE | Noted: 2023-01-01

## 2023-12-10 NOTE — ASSESSMENT & PLAN NOTE
History of A-fib, went into AVR 12/8, converted back to normal sinus rhythm 12/9, now back in afib 12/10    On 12/21:  --Noted to be in Sinus rhythm, started on Eliquis 5 mg twice daily  --follow cards recs

## 2023-12-10 NOTE — CARE PLAN
The patient is Watcher - Medium risk of patient condition declining or worsening    Shift Goals  Clinical Goals: Stable hemodynamics, titrate down vasopressor  Patient Goals: Rest  Family Goals: updates    Progress made toward(s) clinical / shift goals:  Patient slept comfortably through the night and did not complain of pain. Patient was turned Q2 hours, wound care performed on BKA. Target hemodynamics of MAP > 65 and CI >2.2 achieved.       Problem: Pain - Standard  Goal: Alleviation of pain or a reduction in pain to the patient’s comfort goal  Outcome: Progressing     Problem: Skin Integrity  Goal: Skin integrity is maintained or improved  Outcome: Progressing     Problem: Hemodynamics  Goal: Patient's hemodynamics, fluid balance and neurologic status will be stable or improve  Outcome: Progressing

## 2023-12-10 NOTE — PROGRESS NOTES
"Critical Care Progress Note    Date of admission  12/6/2023    Chief Complaint  \"48 y.o. male who presented 12/6/2023 with a past medical history significant for atrial fibrillation, bipolar disorder, obesity, rheumatoid arthritis, sleep apnea and grade 1 diastolic dysfunction with moderate aortic stenosis and pulmonary artery hypertension (RVSP 45 mmHg).  He was admitted to the hospital on December 6 for progressive flatfoot deformity/superficial wound needing open reduction and internal fixation of the right subtalar dislocation.  He was taken to the operating room on day of admission for open reduction of the dislocation as well as right foot triple arthrodeses, right Achilles tendon lengthening and application of multiplanar external fixator.  He was admitted to the hospital postoperatively by the hospitalist service to help manage his coexisting medical disease.  He was started on Lasix and remained on Humira for his rheumatoid arthritis.  During his hospital stay, patient started develop increasing oxygen demand and noted to be going into acute renal failure with elevated liver enzymes.  His IV Lasix was held and he was given IV fluids.  He was started on a bicarbonate drip when his potassium started to rise.  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  Surgery plan to take him back to the OR for I&D of his left BKA and he was started on sepsis protocol.  This evening, patient went into A-fib with RVR.  He was loaded with IV amiodarone and cardiology was consulted recommending diuresis and ongoing amiodarone and a repeat echocardiogram once his rate is better controlled.  I was called this evening however as patient started to develop worsening hypotension for which she was treated with 2 L of crystalloid but remained hypotensive.  At the time my evaluation, I performed a bedside POCUS showing dilated and poorly " "functioning right and left ventricles with a dilated IVC.  I started him on a norepinephrine drip and transferred him to the intensive care unit where a [place] catheter was placed to help guide cardiac support. \"    Hospital Course  12/9 admitted to the ICU for cardiogenic shock and organ dysfunction s/p IVF  12/10 improved hemodynamics and organ dysfunction; less vasopressors/inotropic support    Interval Problem Update  Reviewed last 24 hour events:  Awake and alert  NC oxygen  DBT 2.5  Levo   SR-ST  3.3 L yesterday; -1.8 L so far today  Holding heparin gtt for frankly bloody sputum  Empiric ceftriaxone + linezolid    Review of Systems  Review of Systems   Unable to perform ROS: Critical illness        Vital Signs for last 24 hours   Temp:  [35.8 °C (96.4 °F)-36.2 °C (97.2 °F)] 36.1 °C (97 °F)  Pulse:  [] 100  Resp:  [9-24] 15  BP: ()/(47-64) 102/56  SpO2:  [94 %-98 %] 95 %    Hemodynamic parameters for last 24 hours  CVP:  [9 MM HG-20 MM HG] 12 MM HG  PCWP:  [24 MM HG-31 MM HG] 30 MM HG  CO:  [4.5-7.25] 5.55  CI:  [1.8-3.33] 2.55    Respiratory Information for the last 24 hours       Physical Exam   Physical Exam  Vitals and nursing note reviewed.   Constitutional:       Appearance: He is ill-appearing.   HENT:      Head: Normocephalic and atraumatic.      Right Ear: External ear normal.      Left Ear: External ear normal.      Nose: Nose normal.      Mouth/Throat:      Mouth: Mucous membranes are moist.   Eyes:      Pupils: Pupils are equal, round, and reactive to light.   Cardiovascular:      Rate and Rhythm: Normal rate.      Pulses: Normal pulses.   Pulmonary:      Effort: Pulmonary effort is normal. No respiratory distress.   Abdominal:      General: Abdomen is flat. There is no distension.      Palpations: Abdomen is soft.      Tenderness: There is no abdominal tenderness. There is no guarding or rebound.   Musculoskeletal:      Comments: L amputation site is dehisced but does not appear " overtly infected; no crepitus or induration. R foot dressing c/d/i   Skin:     General: Skin is warm and dry.      Capillary Refill: Capillary refill takes less than 2 seconds.   Neurological:      General: No focal deficit present.      Mental Status: He is alert.      Motor: No weakness.   Psychiatric:         Mood and Affect: Mood normal.         Medications  Current Facility-Administered Medications   Medication Dose Route Frequency Provider Last Rate Last Admin    MD Alert...ICU Electrolyte Replacement per Pharmacy   Other PHARMACY TO DOSE Danny Dobbs M.D.        linezolid (Zyvox) tablet 600 mg  600 mg Oral Q12HRS Danny Dobbs M.D.        enoxaparin (Lovenox) inj 40 mg  40 mg Subcutaneous DAILY AT 1800 Danny Dobbs M.D.        amiodarone (Cordarone) tablet 400 mg  400 mg Oral TWICE DAILY Danny Dobbs M.D.   400 mg at 12/10/23 0944    Followed by    [START ON 12/17/2023] amiodarone (Cordarone) tablet 400 mg  400 mg Oral Q DAY Danny Dobbs M.D.        Followed by    [START ON 12/24/2023] amiodarone (Cordarone) tablet 200 mg  200 mg Oral Q DAY Danny Dobbs M.D.        norepinephrine (Levophed) 8 mg in 250 mL NS infusion (premix)  0-1 mcg/kg/min (Ideal) Intravenous Continuous Jeremy M Gonda, M.D. 2.8 mL/hr at 12/10/23 1528 0.02 mcg/kg/min at 12/10/23 1528    DOBUTamine (Dobutrex) 1 mg/1 mL premix infusion  0-20 mcg/kg/min (Ideal) Intravenous Continuous Danny Dobbs M.D. 11.3 mL/hr at 12/10/23 0741 2.5 mcg/kg/min at 12/10/23 0741    HYDROmorphone (Dilaudid) injection 1 mg  1 mg Intravenous Q4HRS PRN Danny Dobbs M.D.   1 mg at 12/10/23 1344    senna-docusate (Pericolace Or Senokot S) 8.6-50 MG per tablet 2 Tablet  2 Tablet Oral BID Danny Dobbs M.D.   2 Tablet at 12/10/23 0542    And    polyethylene glycol/lytes (Miralax) Packet 1 Packet  1 Packet Oral QDAY PRN Danny Dobbs M.D.   1 Packet at 12/10/23 0847    And    magnesium hydroxide (Milk Of Magnesia) suspension  30 mL  30 mL Oral QDAY PRN Danny Dobbs M.D.        And    bisacodyl (Dulcolax) suppository 10 mg  10 mg Rectal QDAY PRN Danny Dobbs M.D.        hydrocortisone sodium succinate PF (Solu-CORTEF) 100 MG injection 50 mg  50 mg Intravenous Q6HRS Danny Dobbs M.D.   50 mg at 12/10/23 1148    thiamine (B-1) IVPB 100 mg in 100 mL D5W (premix)  100 mg Intravenous DAILY Danny Dobbs M.D.   Stopped at 12/10/23 0559    furosemide (Lasix) injection 60 mg  60 mg Intravenous Q8HRS Danny Dobbs M.D.   60 mg at 12/10/23 1344    oxycodone (Oxy-IR) immediate release tablet 15 mg  15 mg Oral Q6HRS PRN Gil Dalton M.D.   15 mg at 12/10/23 0847    albuterol (Proventil) 2.5mg/0.5ml nebulizer solution 2.5 mg  2.5 mg Nebulization Q4H PRN (RT) Gil Dalton M.D.        cefTRIAXone (Rocephin) syringe 2,000 mg  2,000 mg Intravenous Q24HRS Natalie Watters, A.P.R.N.   2,000 mg at 12/10/23 0554    dakins 0.125% (1/4 strength) topical soln   Topical BID Calvin Pryor M.D.   473 mL at 12/10/23 0550    omeprazole (PriLOSEC) capsule 20 mg  20 mg Oral DAILY Gil Dalton M.D.   20 mg at 12/10/23 0542    DULoxetine (Cymbalta) capsule 60 mg  60 mg Oral DAILY Torsten Grove M.D.   60 mg at 12/10/23 0542       Fluids    Intake/Output Summary (Last 24 hours) at 12/10/2023 1617  Last data filed at 12/10/2023 1400  Gross per 24 hour   Intake 1328.37 ml   Output 6535 ml   Net -5206.63 ml       Laboratory          Recent Labs     12/08/23  1939 12/08/23  2323 12/09/23  0445 12/09/23  0825 12/09/23  1234 12/09/23  1747 12/10/23  0420   SODIUM 126* 123* 125*   < > 125* 125* 129*   POTASSIUM 5.6* 4.9 5.6*   < > 5.0 4.6 3.7   CHLORIDE 91* 91* 91*   < > 92* 90* 92*   CO2 17* 17* 12*   < > 14* 19* 23   BUN 37* 38* 36*   < > 39* 38* 36*   CREATININE 2.00* 1.92* 1.87*   < > 1.82* 1.70* 1.38   MAGNESIUM 2.2  --  2.2  --   --   --  1.7   PHOSPHORUS 5.6* 5.1* 5.7*  --   --   --  3.4   CALCIUM 7.9* 7.4* 7.4*   < > 7.4* 7.4*  7.2*    < > = values in this interval not displayed.     Recent Labs     12/09/23  1234 12/09/23  1747 12/10/23  0420   ALTSGPT 230* 299* 248*   ASTSGOT 1029* 1553* 1146*   ALKPHOSPHAT 248* 247* 218*   TBILIRUBIN 2.5* 2.4* 1.8*   GLUCOSE 84 132* 114*     Recent Labs     12/09/23  0445 12/09/23  0825 12/09/23  1234 12/09/23  1747 12/10/23  0420   WBC 15.7* 17.8* 19.1*  --  10.3   NEUTSPOLYS 83.70*  --   --   --  89.10*   LYMPHOCYTES 9.30*  --   --   --  5.20*   MONOCYTES 6.10  --   --   --  5.00   EOSINOPHILS 0.10  --   --   --  0.00   BASOPHILS 0.10  --   --   --  0.10   ASTSGOT 660* 852* 1029* 1553* 1146*   ALTSGPT 161* 191* 230* 299* 248*   ALKPHOSPHAT 256* 258* 248* 247* 218*   TBILIRUBIN 2.3* 2.5* 2.5* 2.4* 1.8*     Recent Labs     12/08/23  1939 12/08/23  2323 12/09/23  0825 12/09/23  1234 12/10/23  0420   RBC 3.27*   < > 3.26* 3.25* 3.03*   HEMOGLOBIN 8.9*   < > 9.1* 9.0* 8.2*   HEMATOCRIT 28.4*   < > 28.7* 28.1* 25.4*   PLATELETCT 195   < > 200 187 134*   PROTHROMBTM 26.4*  --   --   --   --    APTT 34.6  --   --   --   --    INR 2.39*  --   --   --   --     < > = values in this interval not displayed.       Imaging  X-Ray:  I have personally reviewed the images and compared with prior images.    Assessment/Plan  * Cardiogenic shock (HCC)  Assessment & Plan  Admitted to the ICU 12/09/23 with suspected BiV failure    Mobile guided therapy  DBT for CI > 2.2  Diuretics to achieve euvolemia  Levo for MAP > 65    Transaminitis  Assessment & Plan  Secondary to shock liver and congestive hepatopathy     Maintain adequate perfusion  RUQ US reassuring  Hepatitis panel negative   Avoid hepatotoxins  Monitor LFTs and synthetic function while maintaining perfusion to the liver    Immunosuppression (HCC)  Assessment & Plan  Holding Humira for now given infected left lower extremity    Dislocation of right subtalar joint  Assessment & Plan  S/p ORIF of right subtalar dislocation as well as application of external fixator  12/6  Ortho following  PT/OT    Hx of gastric bypass- (present on admission)  Assessment & Plan  Encourage ongoing weight loss management    Acute renal failure (ARF) (HCC)- (present on admission)  Assessment & Plan  Secondary to cardiogenic shock/ATN   Improving with diuresis     Strict I/Os  Renally dosed medications  Avoid nephrotoxic agents as able  Trend     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)- (present on admission)  Assessment & Plan  Last echo from October 20, 2023 with EF 55%, grade 1 diastolic dysfunction with moderate AAS/AI and RVSP of 45 mmHg --> now in biventricular failure as seen on POCUS per Dr. Gonda 12/9    Repeat ECHO pending  [place] guided therapy  Unable to tolerate GDMT    Cardiology following    S/P BKA (below knee amputation) unilateral, left (HCC)- (present on admission)  Assessment & Plan  With infected stump  Continue antibiotics, follow-up on cultures  Plan for I&D of left BKA when stabilized  Wound care    Hyponatremia- (present on admission)  Assessment & Plan  Secondary to heart failure  Monitor with above therapies    Chronic pain- (present on admission)  Assessment & Plan  Continue as needed analgesics    Atrial fibrillation with RVR (HCC)- (present on admission)  Assessment & Plan  History of A-fib, went into AVR 12/8, converted back to normal sinus rhythm 12/9    Amiodarone   Optimize electrolytes, continuous telemetry monitoring  Heparin drip stopped for hemoptysis     Sepsis (HCC)- (present on admission)  Assessment & Plan  This is Septic shock Not present on admission  SIRS criteria identified on my evaluation include: Tachycardia, with heart rate greater than 90 BPM, Tachypnea, with respirations greater than 20 per minute, and Leukocytosis, with WBC greater than 12,000  Clinical indicators of end organ dysfunction include Hypotension with systolic blood pressure less than 90 or MAP less than 65  Indicators of septic shock include: Sepsis present and  persistent hypotension despite fluid resuscitation   Sources is: Soft tissue  S/p sepsis protocol initiated 12/8  Crystalloid Fluid Administration: Resuscitation volume of 2 L ordered. Reason that resuscitation volume of less than 30ml/kg was ordered concern for causing harm given CHF  IV antibiotics as appropriate for source of sepsis  Reassessment: I have reassessed the patient's hemodynamic status  Monitor lactic acid, urine output, vital signs closely for adequacy of resuscitation    Linezolid + ceftriaxone  Wound culture staph and strep  Follow cultures  MAP > 65 using vasopressors     Nonrheumatic aortic valve stenosis- (present on admission)  Assessment & Plan  Moderate on last echo in October 2023  Repeat ECHO severe AS  Cards eval once stabilized   Maintain euvolemia     Rheumatoid arthritis (HCC)- (present on admission)  Assessment & Plan  As needed analgesics  Holding immunosuppressants for now in the setting of infection         VTE:  Heparin  Ulcer: Not Indicated  Lines: Central Line  Ongoing indication addressed, Arterial Line  Ongoing indication addressed, and Rivera Catheter  Ongoing indication addressed    I have performed a physical exam and reviewed and updated ROS and Plan today (12/10/2023). In review of yesterday's note (12/9/2023), there are no changes except as documented above.     Discussed patient condition and risk of morbidity and/or mortality with RN, RT, Pharmacy, Charge nurse / hot rounds, and Patient  The patient remains critically ill.  Critical care time = 72 minutes in directly providing and coordinating critical care and extensive data review.  No time overlap and excludes procedures.

## 2023-12-10 NOTE — ASSESSMENT & PLAN NOTE
Admitted to the ICU 12/09/23 with suspected BiV failure    - Continue to monitor while off pressors  - IV lasix changed to PO torsemide  - Goal SBP > 90    Now reslved

## 2023-12-10 NOTE — PROGRESS NOTES
MD Dobbs updated on 0600 cardiac numbers.  Cardiac Index 2.2 with Dobutamine off for 2 hours.   Okay to leave Dobutamine off for now.

## 2023-12-10 NOTE — PROGRESS NOTES
SR/a-fib: .21/.11/.40  Currently in a-fib, rate 100-115  [place] in place, secured at 64cm

## 2023-12-10 NOTE — PROGRESS NOTES
[place] numbers off Dobutamine showing CI of 2.1.  Will restart Dobutamine gtt to achieve CI > 2.2 per order.

## 2023-12-10 NOTE — PROGRESS NOTES
Dr. Lima that patient has converted back to a-fib. Discussed current hemodynamics and drips. No new orders received. Rate 100-115.

## 2023-12-10 NOTE — PROGRESS NOTES
Cardiology Follow Up Progress Note    Date of Service  12/10/2023    Attending Physician  Danny Dobbs M.D.    Chief Complaint   Cardiology consultation 12/8/2023 for evaluation of A-fib RVR, patient with history of PAF    HPI  Richard Hubbard II is a 48 y.o. male admitted 12/6/2023 orthopedic surgery to undergo right ankle surgery, prior left BKA.  Post op complicated by KATERYNA and hyperkalemia.    PMH: Bicuspid aortic valve, moderate AS, secondary pulmonary hypertension RVSP 50, HFpEF, rheumatoid arthritis, left below the knee amputation complicated by MSSA bacteremia on 10/23, PAF, A-fib noted on EKG 2019.    Interim Events  Effective diuresis overnight  I/O =-3200 x 24  SBP appropriate  On low-dose Levophed  Off dobutamine  Maintaining SR on amiodarone    Review of Systems  Review of Systems   Constitutional:  Positive for fatigue.   Respiratory:  Negative for apnea, cough, choking, chest tightness and shortness of breath.        Vital signs in last 24 hours  Temp:  [35.8 °C (96.4 °F)-36.2 °C (97.2 °F)] 35.8 °C (96.4 °F)  Pulse:  [] 96  Resp:  [9-24] 15  BP: ()/(47-64) 86/53  SpO2:  [94 %-98 %] 97 %    Physical Exam  Physical Exam  Cardiovascular:      Rate and Rhythm: Normal rate and regular rhythm.   Pulmonary:      Effort: Pulmonary effort is normal.   Skin:     General: Skin is warm.   Neurological:      Mental Status: He is alert. Mental status is at baseline.   Psychiatric:         Mood and Affect: Mood normal.         Lab Review  Lab Results   Component Value Date/Time    WBC 10.3 12/10/2023 04:20 AM    WBC 8.4 10/19/2010 12:05 PM    RBC 3.03 (L) 12/10/2023 04:20 AM    RBC 4.96 10/19/2010 12:05 PM    HEMOGLOBIN 8.2 (L) 12/10/2023 04:20 AM    HEMATOCRIT 25.4 (L) 12/10/2023 04:20 AM    MCV 83.8 12/10/2023 04:20 AM    MCV 85 10/19/2010 12:05 PM    MCH 27.1 12/10/2023 04:20 AM    MCH 28.4 10/19/2010 12:05 PM    MCHC 32.3 12/10/2023 04:20 AM    MPV 10.7 12/10/2023 04:20 AM      Lab Results    Component Value Date/Time    SODIUM 129 (L) 12/10/2023 04:20 AM    POTASSIUM 3.7 12/10/2023 04:20 AM    CHLORIDE 92 (L) 12/10/2023 04:20 AM    CO2 23 12/10/2023 04:20 AM    GLUCOSE 114 (H) 12/10/2023 04:20 AM    BUN 36 (H) 12/10/2023 04:20 AM    CREATININE 1.38 12/10/2023 04:20 AM    CREATININE 0.95 10/19/2010 12:05 PM    BUNCREATRAT 9 10/19/2010 12:05 PM    GLOMRATE >59 10/19/2010 12:05 PM      Lab Results   Component Value Date/Time    ASTSGOT 1146 (HH) 12/10/2023 04:20 AM    ALTSGPT 248 (H) 12/10/2023 04:20 AM     Lab Results   Component Value Date/Time    CHOLSTRLTOT 130 01/28/2021 03:30 PM    LDL 48 01/28/2021 03:30 PM    HDL 68 01/28/2021 03:30 PM    TRIGLYCERIDE 72 01/28/2021 03:30 PM    TROPONINT 27 (H) 12/08/2023 10:03 PM       Recent Labs     12/08/23  0322   NTPROBNP 30242*         Assessment/Plan    # Septic shock  # A-fib RVR mediated by hyperkalemia.  # Prior history of PAF.  # Maintaining SR on amiodarone 400 twice daily.  # KATERYNA, continues to improve.  # Hyponatremia, continues to improve.  # Recent echocardiogram mildly reduced LV systolic function LVEF 45% .  # Moderate to severe AI.  # Severe AS, transvalvular gradients are peak 79 mmHg, mean 48 mmHg, V-max is 4.44M/S.  # Moderate to severe MR,   # RA pressure 15 mmHg   # RVSP 66 mmHg  # Transaminitis      Plan  -Transition to oral amiodarone 400 mg p.o. twice daily.  -Oral anticoagulation when no contraindications.  -IV heparin on hold for bloody sputum.  -Off dobutamine 12/10/2023.  -Remains on low-dose Levophed.  -Remains on empiric antibiotic Rocephin, linezolid.  -GDMT for cardiomyopathy LVEF 45% when off pressors.      I personally spent a total of 20 minutes which includes face-to-face time and non-face-to-face time spent on preparing to see the patient, reviewing hospital notes and tests, obtaining history from the patient, performing a medically appropriate exam, counseling and educating the patient, ordering  medications/tests/procedures/referrals as clinically indicated, and documenting information in the electronic medical record.       Thank you for allowing me to participate in the care of this patient.  I will continue to follow this patient    Please contact me with any questions.    MAURICIO Marsh.   Cardiologist, [facility]  [phone]

## 2023-12-10 NOTE — CARE PLAN
The patient is Unstable - High likelihood or risk of patient condition declining or worsening    Shift Goals  Clinical Goals: Stable hemodynamics, improved lab values  Patient Goals: rest  Family Goals: elias    Progress made toward(s) clinical / shift goals:  Lactic decreased, increase in AST/ALT      Problem: Pain - Standard  Goal: Alleviation of pain or a reduction in pain to the patient’s comfort goal  Outcome: Progressing     Problem: Hemodynamics  Goal: Patient's hemodynamics, fluid balance and neurologic status will be stable or improve  Outcome: Progressing  Converted to a-fib at end of shift. See documentation for CO/CI. Titrated down on levophed.      Problem: Urinary - Renal Perfusion  Goal: Ability to achieve and maintain adequate renal perfusion and functioning will improve  Outcome: Progressing  D/C'd Lasix gtt, lasix pushes started. 2L out of [place]      Problem: Respiratory  Goal: Patient will achieve/maintain optimum respiratory ventilation and gas exchange  Outcome: Progressing

## 2023-12-10 NOTE — ASSESSMENT & PLAN NOTE
He underwent open reduction of right subtalar joint dislocation, right talonavicular joint dislocation right foot triple arthrodesis, right extensor tendon lengthening and application of multiplanar external fixator right lower extremity on 12/6 by ortho  Per orthopedic surgery, patient will be nonweightbearing to toe-touch weightbearing for transfer on the right lower extremity  - PT/OT as tolerated  - DVTppx with Lovenox  - Pain regimen  - Wound care

## 2023-12-10 NOTE — PROGRESS NOTES
Tech from Lab called with critical result of AST at 1900. Critical lab result read back to tech.   Dr. Lima notified of critical lab result at 1905.  Critical lab result read back by Dr. Lima.

## 2023-12-10 NOTE — PROGRESS NOTES
MD Gonda updated with new [place] numbers.   CI 2.5, patient on 2.5 of Dobut and 0.05 norepi.  Okay to stop Dobut gtt and recheck cardiac numbers in one hour.

## 2023-12-10 NOTE — PROGRESS NOTES
[facility] ICU Resident Progress Note      Admit Date: 12/6/2023    Resident(s): Aden Carias M.D.   Attending:  RADHA MIRANDA/ Dr. Dobbs    Patient ID:    Name:  Richard Hubbard   YOB: 1975  Age:  48 y.o.  male   MRN:  [id number]    Hospital Course (carried forward and updated):  Richard Hubbard II is a 48 y.o. male with hx of afib, RA, bipolar, obesity, MONICA, and grade 1 diastolic dysfunction with moderate AS and PAH  who presented for surgery for progressive flatfoot deformity. Pt had open reduction of R subtalar and R talonavicular joint dislocation along with triple arthrodeses and R achilles tendon lengthening 12/6. He developed hypotension with acute renal failure and elevated LFTs. Then started to develop wound breakdown of previous L BKA, started on Ancef 12/8. Then developed afib RVR with hypotension, bedside echo concerning for cardiogenic shock so transferred to ICU.     Consultants:  Critical Care  Cardiology     Interval Events:    12/10: Dobutamine off but now back on as CI < 2.2. Pt converted back to afib, rate controlled.     NEURO:   A&O x4.  CARDIOVASC:  On dobutamine with goal CI > 2.2  RESPIRATORY:  Stable on 2L NC  GI/NUTRITION:  Cardiac diet  RENAL/FLUID/LYTES: UOP 5445 in last 24h. Renal function stable.   HEME/ONC:   N/A  INFECTIOUS D:  On C3 and Linezolid  ENDOCRINE:   N/A    Vitals Range last 24h:  Temp:  [35.8 °C (96.4 °F)-37 °C (98.6 °F)] 35.8 °C (96.4 °F)  Pulse:  [] 93  Resp:  [9-24] 12  BP: ()/(47-64) 93/52  SpO2:  [94 %-98 %] 97 %      Intake/Output Summary (Last 24 hours) at 12/10/2023 1032  Last data filed at 12/10/2023 1000  Gross per 24 hour   Intake 1548.73 ml   Output 6560 ml   Net -5011.27 ml        Review of Systems   Constitutional:  Negative for chills, fever and malaise/fatigue.   HENT:  Negative for ear pain, hearing loss and sinus pain.    Eyes:  Negative for blurred vision, double vision and pain.   Respiratory:  Negative for cough and  shortness of breath.    Cardiovascular:  Negative for chest pain, palpitations and leg swelling.   Gastrointestinal:  Negative for abdominal pain, heartburn, nausea and vomiting.   Genitourinary:  Negative for dysuria, frequency and urgency.   Musculoskeletal:  Positive for joint pain. Negative for falls.   Neurological:  Positive for tingling. Negative for dizziness, weakness and headaches.       PHYSICAL EXAM:  Vitals:    12/10/23 0800 12/10/23 0900 12/10/23 0944 12/10/23 1005   BP: 92/54  98/54 93/52   Pulse: 82 96 84 93   Resp: 15 19  12   Temp: 36 °C (96.8 °F)   (!) 35.8 °C (96.4 °F)   TempSrc: Core   Core   SpO2: 95% 96%  97%   Weight:       Height:        Body mass index is 32.13 kg/m².    O2 therapy: Pulse Oximetry: 97 %, O2 (LPM): 2, O2 Delivery Device: Silicone Nasal Cannula    Date 12/10/23 0700 - 12/11/23 0659   Shift 0811-9047 0972-5427 0558-3451 24 Hour Total   INTAKE   P.O. 400   400     P.O. 400   400   I.V. 91.9   91.9     Magnesium Sulfate Volume 4.1   4.1     Amiodarone Volume 61.6   61.6     Dobutamine Volume 26.2   26.2   Shift Total 491.9   491.9   OUTPUT   Urine 1300   1300     Output (mL) (Urethral Catheter) 1300   1300   Shift Total 1300   1300   NET -808.1   -808.1        Physical Exam  Constitutional:       General: He is not in acute distress.     Appearance: Normal appearance. He is not ill-appearing.   HENT:      Head: Normocephalic and atraumatic.      Nose: Nose normal.      Mouth/Throat:      Mouth: Mucous membranes are moist.      Pharynx: Oropharynx is clear.   Eyes:      General: No scleral icterus.     Extraocular Movements: Extraocular movements intact.      Pupils: Pupils are equal, round, and reactive to light.   Cardiovascular:      Rate and Rhythm: Normal rate and regular rhythm.      Pulses: Normal pulses.      Heart sounds: Normal heart sounds.   Pulmonary:      Effort: Pulmonary effort is normal. No respiratory distress.      Breath sounds: Normal breath sounds. No  wheezing.   Abdominal:      General: Abdomen is flat. Bowel sounds are normal. There is no distension.      Palpations: Abdomen is soft.      Tenderness: There is no abdominal tenderness.   Musculoskeletal:         General: Swelling and tenderness present.      Comments: L BKA wound wrapped without evidence of discharge. RLE wound dry and clean   Skin:     General: Skin is warm and dry.      Coloration: Skin is not jaundiced or pale.   Neurological:      General: No focal deficit present.      Mental Status: He is alert and oriented to person, place, and time. Mental status is at baseline.             Recent Labs     12/08/23  1939 12/08/23  2323 12/09/23  0445 12/09/23  0825 12/09/23  1234 12/09/23  1747 12/10/23  0420   SODIUM 126* 123* 125*   < > 125* 125* 129*   POTASSIUM 5.6* 4.9 5.6*   < > 5.0 4.6 3.7   CHLORIDE 91* 91* 91*   < > 92* 90* 92*   CO2 17* 17* 12*   < > 14* 19* 23   BUN 37* 38* 36*   < > 39* 38* 36*   CREATININE 2.00* 1.92* 1.87*   < > 1.82* 1.70* 1.38   MAGNESIUM 2.2  --  2.2  --   --   --  1.7   PHOSPHORUS 5.6* 5.1* 5.7*  --   --   --  3.4   CALCIUM 7.9* 7.4* 7.4*   < > 7.4* 7.4* 7.2*    < > = values in this interval not displayed.     Recent Labs     12/09/23  1234 12/09/23  1747 12/10/23  0420   ALTSGPT 230* 299* 248*   ASTSGOT 1029* 1553* 1146*   ALKPHOSPHAT 248* 247* 218*   TBILIRUBIN 2.5* 2.4* 1.8*   GLUCOSE 84 132* 114*     Recent Labs     12/08/23  1939 12/08/23  2323 12/09/23  0825 12/09/23  1234 12/10/23  0420   RBC 3.27*   < > 3.26* 3.25* 3.03*   HEMOGLOBIN 8.9*   < > 9.1* 9.0* 8.2*   HEMATOCRIT 28.4*   < > 28.7* 28.1* 25.4*   PLATELETCT 195   < > 200 187 134*   PROTHROMBTM 26.4*  --   --   --   --    APTT 34.6  --   --   --   --    INR 2.39*  --   --   --   --     < > = values in this interval not displayed.     Recent Labs     12/09/23  0445 12/09/23  0825 12/09/23  1234 12/09/23  1747 12/10/23  0420   WBC 15.7* 17.8* 19.1*  --  10.3   NEUTSPOLYS 83.70*  --   --   --  89.10*    LYMPHOCYTES 9.30*  --   --   --  5.20*   MONOCYTES 6.10  --   --   --  5.00   EOSINOPHILS 0.10  --   --   --  0.00   BASOPHILS 0.10  --   --   --  0.10   ASTSGOT 660* 852* 1029* 1553* 1146*   ALTSGPT 161* 191* 230* 299* 248*   ALKPHOSPHAT 256* 258* 248* 247* 218*   TBILIRUBIN 2.3* 2.5* 2.5* 2.4* 1.8*       Meds:   magnesium sulfate  2 g 2 g (12/10/23 0950)    MD Alert...Adult ICU Electrolyte Replacement per Pharmacy        linezolid  600 mg      enoxaparin (LOVENOX) injection  40 mg      amiodarone  400 mg      Followed by    [START ON 12/17/2023] amiodarone  400 mg      Followed by    [START ON 12/24/2023] amiodarone  200 mg      NORepinephrine  0-1 mcg/kg/min (Ideal) Stopped (12/10/23 0700)    DOBUTamine in D5W  0-20 mcg/kg/min (Ideal) 2.5 mcg/kg/min (12/10/23 0741)    HYDROmorphone  1 mg      senna-docusate  2 Tablet      And    polyethylene glycol/lytes  1 Packet      And    magnesium hydroxide  30 mL      And    bisacodyl  10 mg      hydrocortisone sodium succinate PF  50 mg      thiamine  100 mg Stopped (12/10/23 0559)    furosemide  60 mg      oxyCODONE immediate-release  15 mg      albuterol  2.5 mg      cefTRIAXone (ROCEPHIN) IV  2,000 mg      dakins 0.125% (1/4 strength)        omeprazole  20 mg      DULoxetine  60 mg          Procedures:  12/6: Open reduction of R subtalar and talonavicular joint dislocation, R foot triple arthrodesis, R achilles tendon lengthening, application of multiplanar external fixator RLE  12/9: Central and A lines placed    Imaging:  EC-ECHOCARDIOGRAM COMPLETE W/ CONT   Final Result      US-RUQ   Final Result         1. Hepatic steatosis.   2. Otherwise, normal.      DX-CHEST-PORTABLE (1 VIEW)   Final Result         1. [place] catheter tip is located within a right lower lobe pulmonary arterial branch, repositioning may be indicated.   2. Stable pulmonary edema, cardiomegaly, and small pleural effusions.      DX-CHEST-PORTABLE (1 VIEW)   Final Result      1.  Small right  pleural effusion with adjacent airspace disease.   2.  Worsening interstitial infiltrates and/or edema.      DX-CHEST-LIMITED (1 VIEW)   Final Result      1.  Enlarged cardiac silhouette with changes of vascular congestion.      DX-PORTABLE FLUORO > 1 HOUR   Final Result      Portable fluoroscopy utilized for 1 minute 48 seconds.         INTERPRETING LOCATION: [address] , [facility], [address]      DX-FOOT-COMPLETE 3+ RIGHT   Final Result      Digitized intraoperative radiograph is submitted for review. This examination is not for diagnostic purpose but for guidance during a surgical procedure. Please see the patient's chart for full procedural details.         INTERPRETING LOCATION: [address] , [place], [address]      US-EXTREMITY VENOUS LOWER BILAT    (Results Pending)       ASSESSEMENT and PLAN:    * Cardiogenic shock (HCC)  Assessment & Plan  Admitted to the ICU 12/09/23 with suspected BiV failure    - [place] guided therapy  - Dobutamine for CI > 2.2, has needed 2.5 as CI down to 1.8 when off  - Continue IV Lasix 60mg q8h  - Goal MAP > 65, has not needed levophed since 12/9    Transaminitis  Assessment & Plan  Secondary to shock liver    - AST mildly improved, continue to monitor daily  - Maintain adequate perfusion  - RUQ US reassuring  - Hepatitis panel negative  - Avoid hepatotoxins    Immunosuppression (HCC)  Assessment & Plan  Holding Humira for now given infected left lower extremity    Dislocation of right subtalar joint  Assessment & Plan  He underwent open reduction of right subtalar joint dislocation, right talonavicular joint dislocation right foot triple arthrodesis, right extensor tendon lengthening and application of multiplanar external fixator right lower extremity on 12/6 by ortho  Per orthopedic surgery, patient will be nonweightbearing to toe-touch weightbearing for transfer on the right lower extremity  - PT/OT when able  - DVTppx with Lovenox  - Pain regimen  - Wound care    Hx of gastric bypass-  (present on admission)  Assessment & Plan  Encourage ongoing weight loss management    Acute renal failure (ARF) (HCC)- (present on admission)  Assessment & Plan  Secondary to cardiogenic shock/ATN   UOP and renal function improved with diuretics     - Strict I/Os  - Renally dose meds  - Avoid nephrotoxic agents as able  - Continue to monitor    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)- (present on admission)  Assessment & Plan  Last echo from October 20, 2023 with EF 55%, grade 1 diastolic dysfunction with moderate AAS/AI and RVSP of 45 mmHg --> now in biventricular failure as seen on POCUS per Dr. Gonda 12/9    - Repeat echo 12/9 with EF 45% and severe AS  - [place] guided therapy  - Hold GDMT given shock state  - Cardiology following    S/P BKA (below knee amputation) unilateral, left (HCC)- (present on admission)  Assessment & Plan  With infected stump  - Continue C3 and Linzolid for skin infection  - Cultures NGTD so far  - Plan for I&D of left BKA when stabilized  - Continue wound care    Hyponatremia- (present on admission)  Assessment & Plan  Likely 2/2 heart failure  Monitor with above therapies    Chronic pain- (present on admission)  Assessment & Plan  Continue home meds    Atrial fibrillation with RVR (HCC)- (present on admission)  Assessment & Plan  History of A-fib, went into AVR 12/8, converted back to normal sinus rhythm 12/9, now back in afib 12/10    - Will transition Amiodarone  gtt to PO  - Echo 12/9 showing EF 45% and severe AS  - Replace electrolytes prn    Sepsis (HCC)- (present on admission)  Assessment & Plan  This is Septic shock Not present on admission  SIRS criteria identified on my evaluation include: Tachycardia, with heart rate greater than 90 BPM, Tachypnea, with respirations greater than 20 per minute, and Leukocytosis, with WBC greater than 12,000  Clinical indicators of end organ dysfunction include Hypotension with systolic blood pressure less than 90 or MAP less  than 65  Indicators of septic shock include: Sepsis present and persistent hypotension despite fluid resuscitation   Sources is: Soft tissue  S/p sepsis protocol initiated 12/8  Crystalloid Fluid Administration: Resuscitation volume of 2 L ordered. Reason that resuscitation volume of less than 30ml/kg was ordered concern for causing harm given CHF  IV antibiotics as appropriate for source of sepsis  Reassessment: I have reassessed the patient's hemodynamic status  Monitor lactic acid, urine output, vital signs closely for adequacy of resuscitation    - Linezolid + ceftriaxone  - Previous wound culture from 10/2023 grew staph and strep  - Cultures from this admission NGTD   - Goal MAP > 65 using vasopressors     Nonrheumatic aortic valve stenosis- (present on admission)  Assessment & Plan  Moderate on last echo in October 2023  - Repeat echo revealing severe AS  - Maintain euvolemia    Rheumatoid arthritis (HCC)- (present on admission)  Assessment & Plan  As needed analgesics  Holding immunosuppressants for now in the setting of infection      DISPO: Continued ICU stay, pt requiring pressors    CODE STATUS: Full Code    Quality Measures:  Feeding: Cardiac diet  Analgesia: PRN oxycodone and dilaudid  Sedation: N/A  Thromboprophylaxis: Lovenox 40mg qd  Head of bed: >30 degrees  Ulcer prophylaxis: N/A  Glycemic control: N/A  Bowel care: bowel regimen in place  Indwelling lines: Central line, [place]-talat, A line, PIVs, piedra  Deescalation of antibiotics: Linezolid and C3 for wound infection likely source of sepsis      Aden Carias M.D. PGY-2

## 2023-12-10 NOTE — PROGRESS NOTES
Cardiac Numbers    2100: Norepi 0.08, Dobutamine 2.5, Afib  CO 5.0  CI 2.3  PAP 56/30  CVP 15  PCWP -- (did not wedge)   SVR 1000  PVR --    0020: Norepi 0.05, Dobutamine 2.5, Afib  CO 5.6  CI 2.5  PAP 49/28  CVP 12  PCWP 24        0400: Norepi 0.03, Dobutamine 2.5, Afib  CO 5.5  CI 2.5  PAP 48/24  CVP 13  PCWP 24        0600: Norepi 0.02, Afib (Dobutamine off for 2 hrs)  CO 4.8  CI 2.2  PAP 47/19  CVP 14  PCWP 30  SVR 1004

## 2023-12-11 NOTE — PROGRESS NOTES
Updated Dr. Moore:  SBP 70s-80s, Map 63-65 on 1 mcg/kg/min Dobutamine. CI 2.7, .  Orders to restart levo gtt.

## 2023-12-11 NOTE — PROGRESS NOTES
[facility] Resident Progress Note      Admit Date: 12/6/2023    Resident(s): Aden Carias M.D.   Attending:  RADHA MIRANDA/ Dr. Moore    Patient ID:    Name:  Richard Hubbard   YOB: 1975  Age:  48 y.o.  male   MRN:  [id number]    Hospital Course (carried forward and updated):  Richard Hubbard II is a 48 y.o. male with hx of afib, RA, bipolar, obesity, MONICA, and grade 1 diastolic dysfunction with moderate AS and PAH  who presented for surgery for progressive flatfoot deformity. Pt had open reduction of R subtalar and R talonavicular joint dislocation along with triple arthrodeses and R achilles tendon lengthening 12/6. He developed hypotension with acute renal failure and elevated LFTs. Then started to develop wound breakdown of previous L BKA, started on Ancef 12/8. Then developed afib RVR with hypotension, bedside echo concerning for cardiogenic shock so transferred to ICU.     12/10: Dobutamine off but now back on as CI < 2.2. Pt converted back to afib, rate controlled.     Consultants:  Critical Care  Cardiology     Interval Events:    12/11: Lasix decreased to 40mg q8. Lots of ectopy overnight. Will order wound care and obtain wound cx. Stop hydrocortisone.     NEURO:   A&O x4  CARDIOVASC:  On low dose dobutamine with goal CI > 2.2  RESPIRATORY:  Stable on 2L NC  GI/NUTRITION:  Cardiac diet  RENAL/FLUID/LYTES: UOP 5650 in last 24h. Renal function stable.   HEME/ONC:   N/A  INFECTIOUS D:  On C3 and Linezolid  ENDOCRINE:   N/A    Vitals Range last 24h:  Temp:  [35.6 °C (96.1 °F)-36.5 °C (97.7 °F)] 36 °C (96.8 °F)  Pulse:  [] 99  Resp:  [11-22] 13  BP: ()/(50-60) 88/51  SpO2:  [93 %-97 %] 95 %      Intake/Output Summary (Last 24 hours) at 12/11/2023 1036  Last data filed at 12/11/2023 0933  Gross per 24 hour   Intake 1781.39 ml   Output 4750 ml   Net -2968.61 ml          Review of Systems   Constitutional:  Negative for chills, fever and malaise/fatigue.   HENT:  Negative  for ear pain, hearing loss and sinus pain.    Eyes:  Negative for blurred vision, double vision and pain.   Respiratory:  Positive for shortness of breath. Negative for cough.    Cardiovascular:  Negative for chest pain, palpitations and leg swelling.   Gastrointestinal:  Negative for abdominal pain, heartburn, nausea and vomiting.   Genitourinary:  Negative for dysuria, frequency and urgency.   Musculoskeletal:  Positive for joint pain. Negative for falls.   Neurological:  Positive for tingling. Negative for dizziness, weakness and headaches.       PHYSICAL EXAM:  Vitals:    12/11/23 0700 12/11/23 0800 12/11/23 0900 12/11/23 1000   BP: 92/54 93/51 90/53 (!) 88/51   Pulse: 94 99 84 99   Resp: 16 15 15 13   Temp:  36 °C (96.8 °F)     TempSrc:  Core     SpO2: 95% 95%     Weight:       Height:        Body mass index is 30.66 kg/m².    O2 therapy: Pulse Oximetry: 95 %, O2 (LPM): 1, O2 Delivery Device: Silicone Nasal Cannula    Date 12/11/23 0700 - 12/12/23 0659   Shift 7054-9780 6705-0480 5234-5635 24 Hour Total   INTAKE   I.V. 40.1   40.1     Magnesium Sulfate Volume 0   0     Dobutamine Volume 40.1   40.1   IV Piggyback 99.7   99.7     Volume (mL) (potassium chloride in water (Kcl) ivpb **Administer in ICU only** 20 mEq) 99.7   99.7   Shift Total 139.9   139.9   OUTPUT   Urine 400   400     Output (mL) (Urethral Catheter) 400   400   Shift Total 400   400   NET -260.2   -260.2          Physical Exam  Constitutional:       General: He is not in acute distress.     Appearance: Normal appearance. He is not ill-appearing.   HENT:      Head: Normocephalic and atraumatic.      Nose: Nose normal.      Mouth/Throat:      Mouth: Mucous membranes are moist.      Pharynx: Oropharynx is clear.   Eyes:      General: No scleral icterus.     Extraocular Movements: Extraocular movements intact.      Pupils: Pupils are equal, round, and reactive to light.   Cardiovascular:      Rate and Rhythm: Normal rate. Rhythm irregular.       Pulses: Normal pulses.      Heart sounds: Murmur (3/6 systolic) heard.   Pulmonary:      Effort: Pulmonary effort is normal. No respiratory distress.      Breath sounds: Normal breath sounds. No wheezing.   Abdominal:      General: Abdomen is flat. Bowel sounds are normal. There is no distension.      Palpations: Abdomen is soft.      Tenderness: There is no abdominal tenderness.   Musculoskeletal:         General: Swelling and tenderness present.      Comments: L BKA wound wrapped without evidence of discharge. RLE wound dry and clean   Skin:     General: Skin is warm and dry.      Coloration: Skin is not jaundiced or pale.   Neurological:      General: No focal deficit present.      Mental Status: He is alert and oriented to person, place, and time. Mental status is at baseline.      Comments: Delayed verbal responses             Recent Labs     12/09/23  0445 12/09/23  0825 12/09/23  1747 12/10/23  0420 12/10/23  1841 12/10/23  2345 12/11/23  0455   SODIUM 125*   < > 125* 129*  --   --  133*   POTASSIUM 5.6*   < > 4.6 3.7 3.2* 3.4* 3.3*   CHLORIDE 91*   < > 90* 92*  --   --  94*   CO2 12*   < > 19* 23  --   --  28   BUN 36*   < > 38* 36*  --   --  32*   CREATININE 1.87*   < > 1.70* 1.38  --   --  1.03   MAGNESIUM 2.2  --   --  1.7 2.0 2.5 2.4   PHOSPHORUS 5.7*  --   --  3.4  --   --  1.9*   CALCIUM 7.4*   < > 7.4* 7.2*  --   --  7.5*    < > = values in this interval not displayed.       Recent Labs     12/09/23  1747 12/10/23  0420 12/11/23  0455   ALTSGPT 299* 248* 332*   ASTSGOT 1553* 1146* 1323*   ALKPHOSPHAT 247* 218* 225*   TBILIRUBIN 2.4* 1.8* 1.3   GLUCOSE 132* 114* 111*       Recent Labs     12/08/23  1939 12/08/23  2323 12/09/23  1234 12/10/23  0420 12/11/23  0455   RBC 3.27*   < > 3.25* 3.03* 3.21*   HEMOGLOBIN 8.9*   < > 9.0* 8.2* 8.6*   HEMATOCRIT 28.4*   < > 28.1* 25.4* 26.8*   PLATELETCT 195   < > 187 134* 123*   PROTHROMBTM 26.4*  --   --   --   --    APTT 34.6  --   --   --   --    INR 2.39*  --    --   --   --     < > = values in this interval not displayed.       Recent Labs     12/09/23  0445 12/09/23  0825 12/09/23  1234 12/09/23  1747 12/10/23  0420 12/11/23  0455   WBC 15.7*   < > 19.1*  --  10.3 7.9   NEUTSPOLYS 83.70*  --   --   --  89.10* 88.90*   LYMPHOCYTES 9.30*  --   --   --  5.20* 5.20*   MONOCYTES 6.10  --   --   --  5.00 5.30   EOSINOPHILS 0.10  --   --   --  0.00 0.00   BASOPHILS 0.10  --   --   --  0.10 0.10   ASTSGOT 660*   < > 1029* 1553* 1146* 1323*   ALTSGPT 161*   < > 230* 299* 248* 332*   ALKPHOSPHAT 256*   < > 248* 247* 218* 225*   TBILIRUBIN 2.3*   < > 2.5* 2.4* 1.8* 1.3    < > = values in this interval not displayed.         Meds:   furosemide  40 mg      potassium phosphate  30 mmol 30 mmol (12/11/23 0826)    K+ Scale: Goal of 5  1 Each      potassium chloride SA  40 mEq      spironolactone  25 mg      MD Alert...Adult ICU Electrolyte Replacement per Pharmacy        linezolid  600 mg      enoxaparin (LOVENOX) injection  40 mg      amiodarone  400 mg      Followed by    [START ON 12/17/2023] amiodarone  400 mg      Followed by    [START ON 12/24/2023] amiodarone  200 mg      DOBUTamine in D5W  0-20 mcg/kg/min (Ideal) 1 mcg/kg/min (12/11/23 0932)    HYDROmorphone  1 mg      senna-docusate  2 Tablet      And    polyethylene glycol/lytes  1 Packet      And    magnesium hydroxide  30 mL      And    bisacodyl  10 mg      oxyCODONE immediate-release  15 mg      albuterol  2.5 mg      cefTRIAXone (ROCEPHIN) IV  2,000 mg      dakins 0.125% (1/4 strength)        omeprazole  20 mg      DULoxetine  60 mg          Procedures:  12/6: Open reduction of R subtalar and talonavicular joint dislocation, R foot triple arthrodesis, R achilles tendon lengthening, application of multiplanar external fixator RLE  12/9: Central and A lines placed    Imaging:  EC-ECHOCARDIOGRAM COMPLETE W/ CONT   Final Result      US-RUQ   Final Result         1. Hepatic steatosis.   2. Otherwise, normal.       DX-CHEST-PORTABLE (1 VIEW)   Final Result         1. Jupiter-Aung catheter tip is located within a right lower lobe pulmonary arterial branch, repositioning may be indicated.   2. Stable pulmonary edema, cardiomegaly, and small pleural effusions.      DX-CHEST-PORTABLE (1 VIEW)   Final Result      1.  Small right pleural effusion with adjacent airspace disease.   2.  Worsening interstitial infiltrates and/or edema.      DX-CHEST-LIMITED (1 VIEW)   Final Result      1.  Enlarged cardiac silhouette with changes of vascular congestion.      DX-PORTABLE FLUORO > 1 HOUR   Final Result      Portable fluoroscopy utilized for 1 minute 48 seconds.         INTERPRETING LOCATION: [address] [facility], [facility], [address]      DX-FOOT-COMPLETE 3+ RIGHT   Final Result      Digitized intraoperative radiograph is submitted for review. This examination is not for diagnostic purpose but for guidance during a surgical procedure. Please see the patient's chart for full procedural details.         INTERPRETING LOCATION: [address] [facility], [facility], [address]      US-EXTREMITY VENOUS LOWER BILAT    (Results Pending)       ASSESSEMENT and PLAN:    * Cardiogenic shock (HCC)  Assessment & Plan  Admitted to the ICU 12/09/23 with suspected BiV failure    - Jupiter guided therapy  - Dobutamine for CI > 2.2, has needed 2.5 as CI down to 1.8 when off  - Continue IV Lasix 40mg q8h  - Goal MAP > 65    Elevated LFTs  Assessment & Plan  Secondary to shock liver    - AST stable, continue to monitor daily  - Maintain adequate perfusion  - RUQ US reassuring  - Hepatitis panel negative  - Avoid hepatotoxins    Immunosuppression (HCC)  Assessment & Plan  Holding Humira for now given infected left lower extremity    Dislocation of right subtalar joint  Assessment & Plan  He underwent open reduction of right subtalar joint dislocation, right talonavicular joint dislocation right foot triple arthrodesis, right extensor tendon lengthening and application of multiplanar external  fixator right lower extremity on 12/6 by ortho  Per orthopedic surgery, patient will be nonweightbearing to toe-touch weightbearing for transfer on the right lower extremity  - PT/OT when able  - DVTppx with Lovenox  - Pain regimen  - Wound care    Hx of gastric bypass- (present on admission)  Assessment & Plan  Encourage ongoing weight loss management    Hypokalemia- (present on admission)  Assessment & Plan  Replace as needed    Acute kidney injury (HCC)- (present on admission)  Assessment & Plan  RESOLVED  Secondary to cardiogenic shock/ATN   UOP and renal function improved with diuretics     - Strict I/Os  - Renally dose meds  - Avoid nephrotoxic agents as able  - Continue to monitor    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)- (present on admission)  Assessment & Plan  Last echo from October 20, 2023 with EF 55%, grade 1 diastolic dysfunction with moderate AAS/AI and RVSP of 45 mmHg --> now in biventricular failure as seen on POCUS per Dr. Gonda 12/9    - Repeat echo 12/9 with EF 45% and severe AS  - Anita guided therapy  - Hold GDMT given shock state  - Cardiology following    S/P BKA (below knee amputation), left (HCC)- (present on admission)  Assessment & Plan  With infected stump  - Continue C3 and Linzolid for skin infection  - Blood cultures NGTD so far  - Will obtain wound cx  - Plan for I&D of left BKA when stabilized  - Continue wound care    Hyponatremia- (present on admission)  Assessment & Plan  Likely 2/2 heart failure  Monitor with above therapies    Chronic pain- (present on admission)  Assessment & Plan  Continue home meds    Atrial fibrillation with RVR (HCC)- (present on admission)  Assessment & Plan  History of A-fib, went into AVR 12/8, converted back to normal sinus rhythm 12/9, now back in afib 12/10    - Continued PO amiodarone  - Echo 12/9 showing EF 45% and severe AS  - Replace electrolytes prn    Sepsis (HCC)- (present on admission)  Assessment & Plan  This is Septic  shock Not present on admission  SIRS criteria identified on my evaluation include: Tachycardia, with heart rate greater than 90 BPM, Tachypnea, with respirations greater than 20 per minute, and Leukocytosis, with WBC greater than 12,000  Clinical indicators of end organ dysfunction include Hypotension with systolic blood pressure less than 90 or MAP less than 65  Indicators of septic shock include: Sepsis present and persistent hypotension despite fluid resuscitation   Sources is: Soft tissue  S/p sepsis protocol initiated 12/8  Crystalloid Fluid Administration: Resuscitation volume of 2 L ordered. Reason that resuscitation volume of less than 30ml/kg was ordered concern for causing harm given CHF  IV antibiotics as appropriate for source of sepsis  Reassessment: I have reassessed the patient's hemodynamic status  Monitor lactic acid, urine output, vital signs closely for adequacy of resuscitation    - Linezolid + ceftriaxone  - Previous wound culture from 10/2023 grew staph and strep  - Blood cultures from this admission NGTD   - Wound cx ordered  - Goal MAP > 65 using vasopressors     Aortic stenosis due to bicuspid aortic valve- (present on admission)  Assessment & Plan  Moderate on last echo in October 2023  - Repeat echo revealing severe AS  - Maintain euvolemia    Rheumatoid arthritis (HCC)- (present on admission)  Assessment & Plan  As needed analgesics  Holding immunosuppressants for now in the setting of infection      DISPO: Continued ICU stay, pt requiring pressors    CODE STATUS: Full Code    Quality Measures:  Feeding: Cardiac diet  Analgesia: PRN oxycodone and dilaudid  Sedation: N/A  Thromboprophylaxis: Lovenox 40mg qd  Head of bed: >30 degrees  Ulcer prophylaxis: N/A  Glycemic control: N/A  Bowel care: bowel regimen in place  Indwelling lines: Central line, Franklin-talat, A line, PIVs, piedra  Deescalation of antibiotics: Linezolid and C3 for wound infection likely source of sepsis      Aden Carias M.D.  PGY-2

## 2023-12-11 NOTE — CARE PLAN
The patient is Watcher - Medium risk of patient condition declining or worsening    Shift Goals  Clinical Goals: CI > 2.2, hemodynamic stability  Patient Goals: rest  Family Goals: elias    Progress made toward(s) clinical / shift goals:  Patient titrated off norepi. Dobutamine off for most of the shift. Restarted at 0400 for goal CI > 2.2. Wound care completed, patient turned Q2 hours. Patient with great urine output. Patient denied pain through most of the shift, localized pain in leg when repositioning.       Problem: Pain - Standard  Goal: Alleviation of pain or a reduction in pain to the patient’s comfort goal  Outcome: Progressing     Problem: Skin Integrity  Goal: Skin integrity is maintained or improved  Outcome: Progressing     Problem: Hemodynamics  Goal: Patient's hemodynamics, fluid balance and neurologic status will be stable or improve  Outcome: Progressing     Problem: Fluid Volume  Goal: Fluid volume balance will be maintained  Outcome: Progressing

## 2023-12-11 NOTE — PROGRESS NOTES
Patient with saturated dressings around external fixator on right leg.   Attempted to contact ortho MD and PA as patient has no orders or instructions for wound care regarding right leg ex-fix.   Dressings were removed and photos were taken. Cleansed with wound cleanser and pat dry with gauze. New dressing was applied. Wound consult ordered.

## 2023-12-11 NOTE — PROGRESS NOTES
Cardiac Numbers    1945: Dobutamine 2.5, Norepi 0.02, Afib  CO 6.6  CI 3.0  CVP 10  PAP 43/24    PVR 72    *Dobutamine and Norepi turned off at approx 2000.     0000: Afib  CO 5.0  CI 2.3  CVP 12  PAP 42/27    PVR 96    0400: Afib   CO 4.5  CI 2.1  CVP 12  PAP 45/27  SVR 1026      *Dobutamine restarted to achieve goal CI > 2.2 per order.

## 2023-12-11 NOTE — CARE PLAN
Problem: Hemodynamics  Goal: Patient's hemodynamics, fluid balance and neurologic status will be stable or improve  Description: Target End Date:  Prior to discharge or change in level of care    Document on Assessment and I/O flowsheet templates    1.  Monitor vital signs, pulse oximetry and cardiac monitor per provider order and/or policy  2.  Maintain blood pressure per provider order  3.  Hemodynamic monitoring per provider order  4.  Manage IV fluids and IV infusions  5.  Monitor intake and output  6.  Daily weights per unit policy or provider order  7.  Assess peripheral pulses and capillary refill  8.  Assess color and body temperature  9.  Position patient for maximum circulation/cardiac output  10. Monitor for signs/symptoms of excessive bleeding  11. Assess mental status, restlessness and changes in level of consciousness  12. Monitor temperature and report fever or hypothermia to provider immediately. Consideration of targeted temperature management.  Outcome: Progressing     Problem: Fluid Volume  Goal: Fluid volume balance will be maintained  Description: Target End Date:  Prior to discharge or change in level of care    Document on I/O flowsheet    1.  Monitor intake and output as ordered  2.  Promote oral intake as appropriate  3.  Report inadequate intake or output to physician  4.  Administer IV therapy as ordered  5.  Weights per provider order  6.  Assess for signs and symptoms of bleeding  7.  Monitor for signs of fluid overload (respiratory changes, edema, weight gain, increased abdominal girth)  8.  Monitor of signs for inadequate fluid volume (poor skin turgor, dry mucous membranes)  9.  Instruct patient on adherence to fluid restrictions  Outcome: Progressing   The patient is Watcher - Medium risk of patient condition declining or worsening    Shift Goals  Clinical Goals: C.I. > 2.2, maintain hemodynamics  Patient Goals: comfort and pain control  Family Goals: unable to assess    Progress  made toward(s) clinical / shift goals:  Patient's C.I. improving throughout the shift and BP stable with minimal use of levo gtt. Patient also diuresing effectively and CVP reflecting his fluid status. Patient's mentation and appetite much better today. Patient's wife at the bedside throughout the shift for emotional support.     Patient is not progressing towards the following goals:

## 2023-12-11 NOTE — PROGRESS NOTES
Critical Care Progress Note    Date of admission  12/6/2023    Chief Complaint  48 y.o. male admitted 12/6/2023 with a dislocated right subtalar joint.  He has a history of a bicuspid aortic valve with aortic stenosis, left BKA, PAF, rheumatoid arthritis, chronic pain, bipolar disorder.    Hospital Course      12/9 admitted to the ICU for cardiogenic shock and organ dysfunction s/p IVF  12/10 improved hemodynamics and organ dysfunction; less vasopressors/inotropic support    12/11 -    titrating dobutamine.  Force diuresis.  Replete potassium.  Continue ceftriaxone and linezolid.      Interval Problem Update  Reviewed last 24 hour events:      AF  97.7  -3517 mL in the last 24  -347 mL since admit  Dobutamine      Review of Systems  Review of Systems   Unable to perform ROS: Acuity of condition        Vital Signs for last 24 hours   Temp:  [35.6 °C (96.1 °F)-36.5 °C (97.7 °F)] 36 °C (96.8 °F)  Pulse:  [] 99  Resp:  [11-22] 15  BP: ()/(50-60) 93/51  SpO2:  [93 %-97 %] 95 %    Hemodynamic parameters for last 24 hours  CVP:  [9 MM HG-15 MM HG] 11 MM HG  PCWP:  [26 MM HG-30 MM HG] 27 MM HG  CO:  [4.5-7.25] 5.4  CI:  [2.1-3.33] 2.48    Respiratory Information for the last 24 hours       Physical Exam   Physical Exam  Constitutional:       Appearance: He is not diaphoretic.   HENT:      Head: Normocephalic.      Mouth/Throat:      Pharynx: Oropharynx is clear.   Eyes:      Pupils: Pupils are equal, round, and reactive to light.   Cardiovascular:      Comments: Atrial fibrillation  Pulmonary:      Breath sounds: Rales (Few crackles) present. No wheezing.   Abdominal:      General: There is no distension.      Tenderness: There is no abdominal tenderness.   Musculoskeletal:      Comments: Left BKA.  Left BKA wound distress.  External fixator on the right lower extremity.   Skin:     General: Skin is warm.   Neurological:      General: No focal deficit present.      Mental Status: He is oriented to person, place,  and time.      Cranial Nerves: No cranial nerve deficit.         Medications  Current Facility-Administered Medications   Medication Dose Route Frequency Provider Last Rate Last Admin    furosemide (Lasix) injection 40 mg  40 mg Intravenous Q8HRS Beckie Phan M.D.   40 mg at 12/11/23 0526    potassium phosphate IVPB 30 mmol in 500 mL D5W (premix)  30 mmol Intravenous Once You Moore M.D. 83.3 mL/hr at 12/11/23 0826 30 mmol at 12/11/23 0826    K+ Scale: Goal of 5  1 Each Intravenous Q6HRS You Moore M.D.        potassium chloride SA (Kdur) tablet 40 mEq  40 mEq Oral Q8HRS You Moore M.D.   40 mEq at 12/11/23 0823    MD Alert...ICU Electrolyte Replacement per Pharmacy   Other PHARMACY TO DOSE Dnany Dobbs M.D.        linezolid (Zyvox) tablet 600 mg  600 mg Oral Q12HRS Danny Dobbs M.D.   600 mg at 12/11/23 0508    enoxaparin (Lovenox) inj 40 mg  40 mg Subcutaneous DAILY AT 1800 Danny Dobbs M.D.   40 mg at 12/10/23 1738    amiodarone (Cordarone) tablet 400 mg  400 mg Oral TWICE DAILY Danny Dobbs M.D.   400 mg at 12/11/23 0507    Followed by    [START ON 12/17/2023] amiodarone (Cordarone) tablet 400 mg  400 mg Oral Q DAY Danny Dobbs M.D.        Followed by    [START ON 12/24/2023] amiodarone (Cordarone) tablet 200 mg  200 mg Oral Q DAY Danny Dobbs M.D.        norepinephrine (Levophed) 8 mg in 250 mL NS infusion (premix)  0-1 mcg/kg/min (Ideal) Intravenous Continuous Jeremy M Gonda, M.D.   Stopped at 12/10/23 2002    DOBUTamine (Dobutrex) 1 mg/1 mL premix infusion  0-20 mcg/kg/min (Ideal) Intravenous Continuous Danny Dobbs M.D. 11.3 mL/hr at 12/11/23 0700 2.5 mcg/kg/min at 12/11/23 0700    HYDROmorphone (Dilaudid) injection 1 mg  1 mg Intravenous Q4HRS PRN Danny Dobbs M.D.   1 mg at 12/11/23 0618    senna-docusate (Pericolace Or Senokot S) 8.6-50 MG per tablet 2 Tablet  2 Tablet Oral BID Danny Dobbs M.D.   2 Tablet at  12/11/23 0509    And    polyethylene glycol/lytes (Miralax) Packet 1 Packet  1 Packet Oral QDAY PRN Danny Dobbs M.D.   1 Packet at 12/10/23 0847    And    magnesium hydroxide (Milk Of Magnesia) suspension 30 mL  30 mL Oral QDAY PRN Danny Dobbs M.D.   30 mL at 12/10/23 1738    And    bisacodyl (Dulcolax) suppository 10 mg  10 mg Rectal QDAY PRN Danny Dobbs M.D.        hydrocortisone sodium succinate PF (Solu-CORTEF) 100 MG injection 50 mg  50 mg Intravenous Q6HRS Danny Dobbs M.D.   50 mg at 12/11/23 0523    oxycodone (Oxy-IR) immediate release tablet 15 mg  15 mg Oral Q6HRS PRN Gil Dalton M.D.   15 mg at 12/11/23 0522    albuterol (Proventil) 2.5mg/0.5ml nebulizer solution 2.5 mg  2.5 mg Nebulization Q4H PRN (RT) Gil Dalton M.D.        cefTRIAXone (Rocephin) syringe 2,000 mg  2,000 mg Intravenous Q24HRS Natalie Watters, A.P.R.N.   2,000 mg at 12/11/23 0514    dakins 0.125% (1/4 strength) topical soln   Topical BID Calvin Pryor M.D.   473 mL at 12/11/23 0510    omeprazole (PriLOSEC) capsule 20 mg  20 mg Oral DAILY Gil Dalton M.D.   20 mg at 12/11/23 0509    DULoxetine (Cymbalta) capsule 60 mg  60 mg Oral DAILY Torsten Grove M.D.   60 mg at 12/11/23 0508       Fluids    Intake/Output Summary (Last 24 hours) at 12/11/2023 0842  Last data filed at 12/11/2023 0810  Gross per 24 hour   Intake 2218.76 ml   Output 5175 ml   Net -2956.24 ml       Laboratory          Recent Labs     12/09/23  0445 12/09/23  0825 12/09/23  1747 12/10/23  0420 12/10/23  1841 12/10/23  2345 12/11/23  0455   SODIUM 125*   < > 125* 129*  --   --  133*   POTASSIUM 5.6*   < > 4.6 3.7 3.2* 3.4* 3.3*   CHLORIDE 91*   < > 90* 92*  --   --  94*   CO2 12*   < > 19* 23  --   --  28   BUN 36*   < > 38* 36*  --   --  32*   CREATININE 1.87*   < > 1.70* 1.38  --   --  1.03   MAGNESIUM 2.2  --   --  1.7 2.0 2.5 2.4   PHOSPHORUS 5.7*  --   --  3.4  --   --  1.9*   CALCIUM 7.4*   < > 7.4* 7.2*  --   --  7.5*    <  > = values in this interval not displayed.     Recent Labs     12/09/23  1747 12/10/23  0420 12/11/23  0455   ALTSGPT 299* 248* 332*   ASTSGOT 1553* 1146* 1323*   ALKPHOSPHAT 247* 218* 225*   TBILIRUBIN 2.4* 1.8* 1.3   GLUCOSE 132* 114* 111*     Recent Labs     12/09/23  0445 12/09/23  0825 12/09/23  1234 12/09/23  1747 12/10/23  0420 12/11/23  0455   WBC 15.7*   < > 19.1*  --  10.3 7.9   NEUTSPOLYS 83.70*  --   --   --  89.10* 88.90*   LYMPHOCYTES 9.30*  --   --   --  5.20* 5.20*   MONOCYTES 6.10  --   --   --  5.00 5.30   EOSINOPHILS 0.10  --   --   --  0.00 0.00   BASOPHILS 0.10  --   --   --  0.10 0.10   ASTSGOT 660*   < > 1029* 1553* 1146* 1323*   ALTSGPT 161*   < > 230* 299* 248* 332*   ALKPHOSPHAT 256*   < > 248* 247* 218* 225*   TBILIRUBIN 2.3*   < > 2.5* 2.4* 1.8* 1.3    < > = values in this interval not displayed.     Recent Labs     12/08/23  1939 12/08/23  2323 12/09/23  1234 12/10/23  0420 12/11/23  0455   RBC 3.27*   < > 3.25* 3.03* 3.21*   HEMOGLOBIN 8.9*   < > 9.0* 8.2* 8.6*   HEMATOCRIT 28.4*   < > 28.1* 25.4* 26.8*   PLATELETCT 195   < > 187 134* 123*   PROTHROMBTM 26.4*  --   --   --   --    APTT 34.6  --   --   --   --    INR 2.39*  --   --   --   --     < > = values in this interval not displayed.       Imaging  X-Ray:  I have personally reviewed the images and compared with prior images. and My impression is: 12/9/2023 images reviewed - pulmonary edema    Assessment/Plan  * Cardiogenic shock (HCC)  Assessment & Plan  I am titrating dobutamine based upon serial hemodynamic measurements via [place]-Aung catheter    Sepsis (HCC)- (present on admission)  Assessment & Plan  Developed after admission  Skin and soft tissue source  Continue broad-spectrum intravenous antimicrobial chemotherapy    Pulmonary hypertension (HCC)  Assessment & Plan  WHO group 2 (combined systolic and diastolic heart failure, severe aortic stenosis/regurgitation, moderate to severe mitral regurgitation)  RVSP 66 mmHg  Force  diuresis with furosemide    Elevated LFTs  Assessment & Plan  Due to ischemic hepatopathy from heart failure  Trend liver enzymes and synthetic function  Avoid hepatotoxins    Dislocation of right subtalar joint  Assessment & Plan  S/P ORIF on 12/6    Acute kidney injury (HCC)- (present on admission)  Assessment & Plan  Monitor renal function and urine output  Avoid nephrotoxins and renal dose medications  Improving    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)- (present on admission)  Assessment & Plan  LVEF 45%  Grade 3 diastolic dysfunction  Force diuresis with furosemide  I am titrating dobutamine based upon serial hemodynamic measurements from [place]-Aung catheter    S/P BKA (below knee amputation), left (HCC)- (present on admission)  Assessment & Plan  Infected left BKA stump  Continue ceftriaxone and linezolid  Plan for I&D when medically improved  Wound care    Chronic pain- (present on admission)  Assessment & Plan  Continue as needed analgesics    Atrial fibrillation with RVR (HCC)- (present on admission)  Assessment & Plan  Continue amiodarone, 400 mg twice daily  Optimize potassium and magnesium    Aortic stenosis due to bicuspid aortic valve- (present on admission)  Assessment & Plan  Severe aortic stenosis due to bicuspid aortic valve  Mean valve gradient 48 mmHg  Associated moderate to severe aortic insufficiency  Force diuresis with furosemide    Immunosuppression (HCC)  Assessment & Plan  Hold adalimumab with infected left BKA stump    Hx of gastric bypass- (present on admission)  Assessment & Plan       Hypokalemia- (present on admission)  Assessment & Plan  Aggressive potassium repletion    Rheumatoid arthritis (HCC)- (present on admission)  Assessment & Plan  Hold adalimumab due to infected left BKA stump         VTE:  Contraindicated  Ulcer: Not Indicated  Lines: Central Line  Ongoing indication addressed, Arterial Line  Ongoing indication addressed, and Rivera Catheter  Ongoing  indication addressed    I have performed a physical exam and reviewed and updated ROS and Plan today (12/11/2023). In review of yesterday's note (12/10/2023), there are no changes except as documented above.     I have assessed and reassessed his respiratory status, blood pressure, hemodynamics, cardiovascular status with titration of dobutamine.  He is at increased risk for worsening respiratory, cardiovascular, hepatic and renal system dysfunction.    Discussed patient condition and risk of morbidity and/or mortality with RN, RT, Pharmacy, Charge nurse / hot rounds, and QA team    The patient remains critically ill.  Critical care time = 35 minutes in directly providing and coordinating critical care and extensive data review.  No time overlap and excludes procedures.    You Moore MD  Pulmonary and Critical Care Medicine

## 2023-12-11 NOTE — PROGRESS NOTES
Critical Care Progress Note    Date of admission  12/6/2023    Chief Complaint  48 y.o. male admitted 12/6/2023 with a dislocated right subtalar joint.  He has a history of a bicuspid aortic valve with aortic stenosis, left BKA, PAF, rheumatoid arthritis, chronic pain, bipolar disorder.    Hospital Course      12/9 admitted to the ICU for cardiogenic shock and organ dysfunction s/p IVF  12/10 improved hemodynamics and organ dysfunction; less vasopressors/inotropic support    12/11 -    titrating dobutamine.  Force diuresis.  Replete potassium.  Continue ceftriaxone and linezolid.  12/12 -    titrating dobutamine.  Titrating norepinephrine.  Force diuresis.  Continue ceftriaxone and linezolid.      Interval Problem Update  Reviewed last 24 hour events:      AF  Dobutamine  NE  98.2  -909 mL in the last 24  -1256 mL since admit      Review of Systems  Review of Systems   Unable to perform ROS: Acuity of condition        Vital Signs for last 24 hours   Temp:  [36.2 °C (97.2 °F)-36.8 °C (98.2 °F)] 36.5 °C (97.7 °F)  Pulse:  [] 90  Resp:  [8-36] 15  BP: ()/(49-64) 97/56  SpO2:  [87 %-98 %] 95 %    Hemodynamic parameters for last 24 hours  CVP:  [6 MM HG-10 MM HG] 8 MM HG  PCWP:  [22 MM HG-30 MM HG] 25 MM HG  CO:  [3.65-5.8] 4.8  CI:  [1.67-2.66] 2.2    Respiratory Information for the last 24 hours       Physical Exam   Physical Exam  Constitutional:       Appearance: He is not diaphoretic.   HENT:      Head: Normocephalic.      Mouth/Throat:      Pharynx: Oropharynx is clear.   Eyes:      Pupils: Pupils are equal, round, and reactive to light.   Cardiovascular:      Comments: Atrial fibrillation  Pulmonary:      Breath sounds: Rales (Scattered crackles) present. No wheezing.   Abdominal:      General: There is no distension.      Tenderness: There is no abdominal tenderness.   Musculoskeletal:      Comments: Left BKA.  Left BKA wound dressed.  External fixator on the right lower extremity.   Skin:      General: Skin is warm.   Neurological:      General: No focal deficit present.      Mental Status: He is oriented to person, place, and time.      Cranial Nerves: No cranial nerve deficit.         Medications  Current Facility-Administered Medications   Medication Dose Route Frequency Provider Last Rate Last Admin    furosemide (Lasix) injection 40 mg  40 mg Intravenous Q8HRS Beckie Phan M.D.   40 mg at 12/12/23 0535    spironolactone (Aldactone) tablet 25 mg  25 mg Oral Q DAY Romaine Clements M.D.   25 mg at 12/12/23 0529    norepinephrine (Levophed) 8 mg in 250 mL NS infusion (premix)  0-1 mcg/kg/min (Ideal) Intravenous Continuous You Moore M.D. 7.1 mL/hr at 12/12/23 0305 0.05 mcg/kg/min at 12/12/23 0305    K+ Scale: Goal of 5  1 Each Intravenous Q6HRS You Moore M.D.   1 Each at 12/11/23 1800    MD Alert...ICU Electrolyte Replacement per Pharmacy   Other PHARMACY TO DOSE Danny Dobbs M.D.        linezolid (Zyvox) tablet 600 mg  600 mg Oral Q12HRS Danny Dobbs M.D.   600 mg at 12/12/23 0530    enoxaparin (Lovenox) inj 40 mg  40 mg Subcutaneous DAILY AT 1800 Danny Dobbs M.D.   40 mg at 12/11/23 1713    amiodarone (Cordarone) tablet 400 mg  400 mg Oral TWICE DAILY Danny Dobbs M.D.   400 mg at 12/12/23 0528    Followed by    [START ON 12/17/2023] amiodarone (Cordarone) tablet 400 mg  400 mg Oral Q DAY Danny Dobbs M.D.        Followed by    [START ON 12/24/2023] amiodarone (Cordarone) tablet 200 mg  200 mg Oral Q DAY Danny Dobbs M.D.        DOBUTamine (Dobutrex) 1 mg/1 mL premix infusion  0-20 mcg/kg/min (Ideal) Intravenous Continuous Danny Dobbs M.D. 11.3 mL/hr at 12/11/23 1939 2.5 mcg/kg/min at 12/11/23 1939    HYDROmorphone (Dilaudid) injection 1 mg  1 mg Intravenous Q4HRS PRN Danny Dobbs M.D.   1 mg at 12/12/23 0545    senna-docusate (Pericolace Or Senokot S) 8.6-50 MG per tablet 2 Tablet  2 Tablet Oral BID Danny Dobbs M.D.    2 Tablet at 12/11/23 1714    And    polyethylene glycol/lytes (Miralax) Packet 1 Packet  1 Packet Oral QDAY PRN Danny Dobbs M.D.   1 Packet at 12/12/23 0532    And    magnesium hydroxide (Milk Of Magnesia) suspension 30 mL  30 mL Oral QDAY PRN Danny Dobbs M.D.   30 mL at 12/12/23 0534    And    bisacodyl (Dulcolax) suppository 10 mg  10 mg Rectal QDAY PRN Danny Dobbs M.D.        oxycodone (Oxy-IR) immediate release tablet 15 mg  15 mg Oral Q6HRS PRN Gil Dalton M.D.   15 mg at 12/12/23 0840    albuterol (Proventil) 2.5mg/0.5ml nebulizer solution 2.5 mg  2.5 mg Nebulization Q4H PRN (RT) Gil Dalton M.D.        cefTRIAXone (Rocephin) syringe 2,000 mg  2,000 mg Intravenous Q24HRS Natalie Watters, A.P.R.N.   2,000 mg at 12/12/23 0533    dakins 0.125% (1/4 strength) topical soln   Topical BID Calvin Pryor M.D.   1 mL at 12/11/23 1713    omeprazole (PriLOSEC) capsule 20 mg  20 mg Oral DAILY Gil Dalton M.D.   20 mg at 12/12/23 0532    DULoxetine (Cymbalta) capsule 60 mg  60 mg Oral DAILY Torsten Grove M.D.   60 mg at 12/12/23 0532       Fluids    Intake/Output Summary (Last 24 hours) at 12/12/2023 0853  Last data filed at 12/12/2023 0551  Gross per 24 hour   Intake 1043.92 ml   Output 1675 ml   Net -631.08 ml       Laboratory          Recent Labs     12/10/23  0420 12/10/23  1841 12/10/23  2345 12/11/23  0455 12/11/23  1205 12/11/23  1720 12/12/23  0005 12/12/23  0440   SODIUM 129*  --   --  133*  --   --   --  136   POTASSIUM 3.7   < > 3.4* 3.3*   < > 4.6 5.0 5.0   CHLORIDE 92*  --   --  94*  --   --   --  97   CO2 23  --   --  28  --   --   --  27   BUN 36*  --   --  32*  --   --   --  31*   CREATININE 1.38  --   --  1.03  --   --   --  0.93   MAGNESIUM 1.7   < > 2.5 2.4  --   --   --  2.2   PHOSPHORUS 3.4  --   --  1.9*  --   --   --  2.4*   CALCIUM 7.2*  --   --  7.5*  --   --   --  7.6*    < > = values in this interval not displayed.     Recent Labs     12/10/23  0421  12/11/23  0455 12/12/23  0440   ALTSGPT 248* 332* 307*   ASTSGOT 1146* 1323* 764*   ALKPHOSPHAT 218* 225* 230*   TBILIRUBIN 1.8* 1.3 1.0   GLUCOSE 114* 111* 103*     Recent Labs     12/10/23  0420 12/11/23  0455 12/12/23  0440   WBC 10.3 7.9 10.6   NEUTSPOLYS 89.10* 88.90* 79.40*   LYMPHOCYTES 5.20* 5.20* 12.40*   MONOCYTES 5.00 5.30 7.60   EOSINOPHILS 0.00 0.00 0.00   BASOPHILS 0.10 0.10 0.10   ASTSGOT 1146* 1323* 764*   ALTSGPT 248* 332* 307*   ALKPHOSPHAT 218* 225* 230*   TBILIRUBIN 1.8* 1.3 1.0     Recent Labs     12/10/23  0420 12/11/23  0455 12/12/23  0440   RBC 3.03* 3.21* 3.54*   HEMOGLOBIN 8.2* 8.6* 9.4*   HEMATOCRIT 25.4* 26.8* 30.1*   PLATELETCT 134* 123* 125*       Imaging  X-Ray:  I have personally reviewed the images and compared with prior images. and My impression is: 12/9/2023 images reviewed - pulmonary edema    Assessment/Plan  * Cardiogenic shock (HCC)  Assessment & Plan  I am titrating dobutamine and norepinephrine based upon serial hemodynamic measurements via [place]-Aung catheter    Sepsis (HCC)- (present on admission)  Assessment & Plan  Developed after admission  Skin and soft tissue source  Continue broad-spectrum intravenous antimicrobial chemotherapy    Pulmonary hypertension (HCC)  Assessment & Plan  WHO group 2 (combined systolic and diastolic heart failure, severe aortic stenosis/regurgitation, moderate to severe mitral regurgitation)  RVSP 66 mmHg  Force diuresis with furosemide    Elevated LFTs  Assessment & Plan  Due to ischemic hepatopathy from heart failure  Trend liver enzymes and synthetic function  Avoid hepatotoxins    Dislocation of right subtalar joint  Assessment & Plan  S/P ORIF on 12/6    Acute kidney injury (HCC)- (present on admission)  Assessment & Plan  Monitor renal function and urine output  Avoid nephrotoxins and renal dose medications  Improving    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)- (present on admission)  Assessment & Plan  LVEF 45%  Grade 3  diastolic dysfunction  Force diuresis with furosemide  I am titrating dobutamine based upon serial hemodynamic measurements from [place]-Aung catheter    S/P BKA (below knee amputation), left ([facility])- (present on admission)  Assessment & Plan  Infected left BKA stump  Continue ceftriaxone and linezolid  Plan for I&D when medically improved  Wound care    Chronic pain- (present on admission)  Assessment & Plan  Continue as needed analgesics    Atrial fibrillation with RVR ([facility])- (present on admission)  Assessment & Plan  Continue amiodarone, 400 mg twice daily  Optimize potassium and magnesium    Aortic stenosis due to bicuspid aortic valve- (present on admission)  Assessment & Plan  Severe aortic stenosis due to bicuspid aortic valve  Mean valve gradient 48 mmHg  Associated moderate to severe aortic insufficiency  Force diuresis with furosemide    Immunosuppression ([facility])  Assessment & Plan  Hold adalimumab with infected left BKA stump    Hx of gastric bypass- (present on admission)  Assessment & Plan       Rheumatoid arthritis ([facility])- (present on admission)  Assessment & Plan  Hold adalimumab due to infected left BKA stump         VTE:  Contraindicated  Ulcer: Not Indicated  Lines: Central Line  Ongoing indication addressed, Arterial Line  Ongoing indication addressed, and Rivera Catheter  Ongoing indication addressed    I have performed a physical exam and reviewed and updated ROS and Plan today (12/12/2023). In review of yesterday's note (12/11/2023), there are no changes except as documented above.     I have assessed and reassessed his respiratory status, blood pressure, hemodynamics, cardiovascular status with titration of dobutamine and norepinephrine.  He is at increased risk for worsening respiratory, cardiovascular, hepatic and renal system dysfunction.    Discussed patient condition and risk of morbidity and/or mortality with RN, RT, Pharmacy, Charge nurse / hot rounds, and QA team    The patient remains  critically ill.  Critical care time = 40 minutes in directly providing and coordinating critical care and extensive data review.  No time overlap and excludes procedures.    You Moore MD  Pulmonary and Critical Care Medicine

## 2023-12-11 NOTE — PROGRESS NOTES
New cardiac numbers showing C.I. of 3.0 on 2.5 Dobutamine and 0.02 Norepi.  MD Armenta updated.  Will stop Dobutamine for now and re-evaluate cardiac numbers.   Goal C.I. > 2.2.

## 2023-12-11 NOTE — PROGRESS NOTES
Cardiology Progress Note    Date of Service  12/11/2023    Attending Physician  Danny Dobbs M.D.    Events/Subjective  Diuresed well yesterday, Cr continues to improve.  Converted to atrial fibrillation/flutter overnight.  Remains on low-dose DBT, NE  No acute complaints this morning, is concerned about his potential need in the future for heart valve surgery    Vitals  Temp:  [35.6 °C (96.1 °F)-36.5 °C (97.7 °F)] 36 °C (96.8 °F)  Pulse:  [] 99  Resp:  [11-22] 15  BP: ()/(50-60) 93/51  SpO2:  [93 %-97 %] 95 %    Physical Exam  General: Chronically ill-appearing, but in no acute distress  Eyes: Extraocular movements intact, anicteric  Neck: Full range of motion, unable to assess JVP with PA catheter in place, last CVP 11 mmHg  Pulmonary: Normal respiratory effort, remains on supplemental oxygen  Cardiovascular: Irregular, borderline tachycardic  Gastrointestinal: Obese, nondistended  Extremities: Left lower extremity BKA, right lower extremity in fixation device  Neurological: Alert and oriented, normal speech    Laboratories  Lab Results   Component Value Date/Time    WBC 7.9 12/11/2023 04:55 AM    WBC 8.4 10/19/2010 12:05 PM    RBC 3.21 (L) 12/11/2023 04:55 AM    RBC 4.96 10/19/2010 12:05 PM    HEMOGLOBIN 8.6 (L) 12/11/2023 04:55 AM    HEMATOCRIT 26.8 (L) 12/11/2023 04:55 AM    MCV 83.5 12/11/2023 04:55 AM    MCV 85 10/19/2010 12:05 PM    MCH 26.8 (L) 12/11/2023 04:55 AM    MCH 28.4 10/19/2010 12:05 PM    MCHC 32.1 (L) 12/11/2023 04:55 AM    MPV 10.8 12/11/2023 04:55 AM      Lab Results   Component Value Date/Time    SODIUM 133 (L) 12/11/2023 04:55 AM    POTASSIUM 3.3 (L) 12/11/2023 04:55 AM    CHLORIDE 94 (L) 12/11/2023 04:55 AM    CO2 28 12/11/2023 04:55 AM    GLUCOSE 111 (H) 12/11/2023 04:55 AM    BUN 32 (H) 12/11/2023 04:55 AM    CREATININE 1.03 12/11/2023 04:55 AM    CREATININE 0.95 10/19/2010 12:05 PM    BUNCREATRAT 9 10/19/2010 12:05 PM    GLOMRATE >59 10/19/2010 12:05 PM      Lab Results  "  Component Value Date/Time    ASTSGOT 1323 (HH) 12/11/2023 04:55 AM    ALTSGPT 332 (H) 12/11/2023 04:55 AM     Lab Results   Component Value Date/Time    CHOLSTRLTOT 130 01/28/2021 03:30 PM    LDL 48 01/28/2021 03:30 PM    HDL 68 01/28/2021 03:30 PM    TRIGLYCERIDE 72 01/28/2021 03:30 PM    TROPONINT 27 (H) 12/08/2023 10:03 PM       No results for input(s): \"NTPROBNP\" in the last 72 hours.    Telemetry  Atrial fibrillation/flutter    Studies  TTE (12/9/2023)  The left ventricle is mildly dilated. The left ventricular ejection fraction is visually estimated to be 45%.  Mildly reduced left ventricular systolic function.  Normal right ventricular size and systolic function.  Moderately dilated left atrium  Aortic valve not well viauzlied; possibly bicuspid aortic valve.  Severe aortic valve stenosis.  Moderate to severe aortic insufficiency.  Moderate to severe mitral regurgitation.  Mild tricuspid regurgitation.   Right atrial pressure is estimated to be 15 mmHg. Estimated right ventricular systolic pressure is 66 mmHg; severely elevated.  The ascending aorta is moderately dilated with a diameter of 4.5 cm.    Assessment  # Mixed septic and cardiogenic shock  # Atrial fibrillation/flutter with RVR  # Severe bicuspid aortic valve stenosis/regurgitation  # Moderate to severe mitral regurgitation  # Acute heart failure with reduced ejection fraction (LVEF 45%)  # Cardiogenic shock developed in the setting of atrial fibrillation with RVR and severe bicuspid aortic valve stenosis.  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 bid  -Start NOAC when no surgical contraindication, IV heparin currently on hold for bloody sputum.  -Continue " intravenous diuresis with furosemide 40 mg tid  -Daily BMP and aggressive potassium repletion hold diuresis  -If SBP remains >90 start spironolactone to assist with diuresis and potassium retention, this generally does not appreciably affect blood pressure and heart failure patients hypertension      A total of 40 minutes of critical care time was spent evaluating and managing this patient remains on intravenous vasoactive agents for treatment of mixed cardiogenic and septic shock with acute liver and acute kidney injury in setting of severe bicuspid aortic valve stenosis.    Cardiology will continue to follow.  Please contact me with any questions.    Romaine Clements M.D.   Cardiologist, [facility]

## 2023-12-11 NOTE — DISCHARGE PLANNING
Case Management Discharge Planning    Admission Date: 12/6/2023  GMLOS: 7.3  ALOS: 5    6-Clicks ADL Score: 22  6-Clicks Mobility Score: 10  PT and/or OT Eval ordered: Yes  Post-acute Referrals Ordered: No  Post-acute Choice Obtained: No  Has referral(s) been sent to post-acute provider:  No      Anticipated Discharge Dispo: Discharge Disposition: Disch to  rehab facility or distinct part unit (62)  Per chart review pt resides in [place], [place].    Family support:   Name                          Relation              Phone   Katiana Hubbard          Spouse                   [phone]  Priscila Hubbard          Mother                    [phone]    Current Insurance on file: [employer]    DME Needed: No    Action(s) Taken: Updated Provider/Nurse on Discharge Plan    Cart review complete; Pt was discussed in IDT rounds today; Pt admitted to the hospital following orthoptic surgery; Pt transferred to ICU level of care for cardiogenic shock; Pt remains critically ill at this time; Pt pending OT Eval with PT recommending post-acute placement; Pt currently on service with Sameera RUSS; LSW will continue to follow to assist with any CM needs.     Escalations Completed: None    Medically Clear: No    Next Steps:  f/u with pt and medical team to discuss dc needs and barriers.     LSW to complete DC assessment.     Barriers to Discharge: Medical clearance, Pending Placement, and Pending PT Evaluation    Is the patient up for discharge tomorrow: No

## 2023-12-11 NOTE — ASSESSMENT & PLAN NOTE
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

## 2023-12-11 NOTE — PROGRESS NOTES
C.I. stable throughout the shift and above 2.2. MD Dobbs updated on plan to turn off dobutamine gtt at 1600. Will repeat C.I. at 1800.

## 2023-12-11 NOTE — PROGRESS NOTES
Patient with new ectopy over the last hour. Afib with frequent PVC's, bigem, RBBB.  STAT 12 lead EKG ordered to confirm.   New labs with K 3.2, Mag 2, MD Armenta updated.

## 2023-12-11 NOTE — PROGRESS NOTES
1800 C.I. 2.2. Dobutamine gtt restarted.     At 1800 patient having much more frequent PVC's and going in and out of bigeminy. MD Armenta notified. Potassium and Magnesium level drawn and sent STAT to lab.

## 2023-12-11 NOTE — PROGRESS NOTES
RCC    Called by RN for ectopy  Great UO after diuretics  K 3.2 and Mg 2.0    Will K scale to 4.5 and dose 2 grams MgSO4

## 2023-12-12 NOTE — PROGRESS NOTES
[facility] ICU Resident Progress Note      Admit Date: 12/6/2023    Resident(s): Aden Carias M.D.   Attending:  RADHA MIRANDA/ Dr. Moore    Patient ID:    Name:  Richard Hubbard   YOB: 1975  Age:  48 y.o.  male   MRN:  [id number]    Hospital Course (carried forward and updated):  Richard Hubbard II is a 48 y.o. male with hx of afib, RA, bipolar, obesity, MONICA, and grade 1 diastolic dysfunction with moderate AS and PAH  who presented for surgery for progressive flatfoot deformity. Pt had open reduction of R subtalar and R talonavicular joint dislocation along with triple arthrodeses and R achilles tendon lengthening 12/6. He developed hypotension with acute renal failure and elevated LFTs. Then started to develop wound breakdown of previous L BKA, started on Ancef 12/8. Then developed afib RVR with hypotension, bedside echo concerning for cardiogenic shock so transferred to ICU.     12/10: Dobutamine off but now back on as CI < 2.2. Pt converted back to afib, rate controlled.   12/11: Lasix decreased to 40mg q8. Lots of ectopy overnight. Wound cx obtained. Stopped hydrocortisone.     Consultants:  Critical Care  Cardiology     Interval Events:    12/12: Transaminases stable/downtrending. Changing Lasix from q8 to q12.     NEURO:   A&O x4  CARDIOVASC:  On low dose dobutamine and levophed with goal CI > 2.2  RESPIRATORY:  Stable on RA  GI/NUTRITION:  Cardiac diet  RENAL/FLUID/LYTES: UOP 2075 in last 24h. Renal function stable.   HEME/ONC:   N/A  INFECTIOUS D:  On C3 and Linezolid  ENDOCRINE:   N/A    Vitals Range last 24h:  Temp:  [36.2 °C (97.2 °F)-36.8 °C (98.2 °F)] 36.5 °C (97.7 °F)  Pulse:  [] 113  Resp:  [8-36] 16  BP: ()/(49-64) 94/53  SpO2:  [87 %-98 %] 89 %      Intake/Output Summary (Last 24 hours) at 12/12/2023 0805  Last data filed at 12/12/2023 0551  Gross per 24 hour   Intake 1143.63 ml   Output 1675 ml   Net -531.37 ml          Review of Systems   Constitutional:   Negative for chills, fever and malaise/fatigue.   HENT:  Negative for ear pain, hearing loss and sinus pain.    Eyes:  Negative for blurred vision, double vision and pain.   Respiratory:  Positive for shortness of breath. Negative for cough.    Cardiovascular:  Negative for chest pain, palpitations and leg swelling.   Gastrointestinal:  Negative for abdominal pain, heartburn, nausea and vomiting.   Genitourinary:  Negative for dysuria, frequency and urgency.   Musculoskeletal:  Positive for joint pain. Negative for falls.   Neurological:  Positive for tingling. Negative for dizziness, weakness and headaches.       PHYSICAL EXAM:  Vitals:    12/12/23 0630 12/12/23 0645 12/12/23 0700 12/12/23 0715   BP: 90/50  94/53    Pulse: 98 (!) 114 (!) 103 (!) 113   Resp: 16 13 20 16   Temp:   36.5 °C (97.7 °F)    TempSrc:   Core    SpO2: 95% 94% 97% 89%   Weight:       Height:        Body mass index is 30.62 kg/m².    O2 therapy: Pulse Oximetry: 89 %, O2 (LPM): 0, O2 Delivery Device: None - Room Air           Physical Exam  Constitutional:       General: He is not in acute distress.     Appearance: Normal appearance. He is not ill-appearing.   HENT:      Head: Normocephalic and atraumatic.      Nose: Nose normal.      Mouth/Throat:      Mouth: Mucous membranes are moist.      Pharynx: Oropharynx is clear.   Eyes:      General: No scleral icterus.     Extraocular Movements: Extraocular movements intact.      Pupils: Pupils are equal, round, and reactive to light.   Cardiovascular:      Rate and Rhythm: Normal rate. Rhythm irregular.      Pulses: Normal pulses.      Heart sounds: Murmur (3/6 systolic) heard.   Pulmonary:      Effort: Pulmonary effort is normal. No respiratory distress.      Breath sounds: Normal breath sounds. No wheezing.   Abdominal:      General: Abdomen is flat. Bowel sounds are normal. There is no distension.      Palpations: Abdomen is soft.      Tenderness: There is no abdominal tenderness.    Musculoskeletal:         General: Swelling and tenderness present.      Comments: L BKA wound wrapped without evidence of discharge. RLE wound dry and clean   Skin:     General: Skin is warm and dry.      Coloration: Skin is not jaundiced or pale.   Neurological:      General: No focal deficit present.      Mental Status: He is alert and oriented to person, place, and time. Mental status is at baseline.             Recent Labs     12/10/23  0420 12/10/23  1841 12/10/23  2345 12/11/23  0455 12/11/23  1205 12/11/23  1720 12/12/23  0005 12/12/23  0440   SODIUM 129*  --   --  133*  --   --   --  136   POTASSIUM 3.7   < > 3.4* 3.3*   < > 4.6 5.0 5.0   CHLORIDE 92*  --   --  94*  --   --   --  97   CO2 23  --   --  28  --   --   --  27   BUN 36*  --   --  32*  --   --   --  31*   CREATININE 1.38  --   --  1.03  --   --   --  0.93   MAGNESIUM 1.7   < > 2.5 2.4  --   --   --  2.2   PHOSPHORUS 3.4  --   --  1.9*  --   --   --  2.4*   CALCIUM 7.2*  --   --  7.5*  --   --   --  7.6*    < > = values in this interval not displayed.       Recent Labs     12/10/23  0420 12/11/23  0455 12/12/23  0440   ALTSGPT 248* 332* 307*   ASTSGOT 1146* 1323* 764*   ALKPHOSPHAT 218* 225* 230*   TBILIRUBIN 1.8* 1.3 1.0   GLUCOSE 114* 111* 103*       Recent Labs     12/10/23  0420 12/11/23  0455 12/12/23  0440   RBC 3.03* 3.21* 3.54*   HEMOGLOBIN 8.2* 8.6* 9.4*   HEMATOCRIT 25.4* 26.8* 30.1*   PLATELETCT 134* 123* 125*       Recent Labs     12/10/23  0420 12/11/23  0455 12/12/23 0440   WBC 10.3 7.9 10.6   NEUTSPOLYS 89.10* 88.90* 79.40*   LYMPHOCYTES 5.20* 5.20* 12.40*   MONOCYTES 5.00 5.30 7.60   EOSINOPHILS 0.00 0.00 0.00   BASOPHILS 0.10 0.10 0.10   ASTSGOT 1146* 1323* 764*   ALTSGPT 248* 332* 307*   ALKPHOSPHAT 218* 225* 230*   TBILIRUBIN 1.8* 1.3 1.0         Meds:   furosemide  40 mg      spironolactone  25 mg      NORepinephrine  0-1 mcg/kg/min (Ideal) 0.05 mcg/kg/min (12/12/23 0305)    K+ Scale: Goal of 5  1 Each      MD Alert...Adult  ICU Electrolyte Replacement per Pharmacy        linezolid  600 mg      enoxaparin (LOVENOX) injection  40 mg      amiodarone  400 mg      Followed by    [START ON 12/17/2023] amiodarone  400 mg      Followed by    [START ON 12/24/2023] amiodarone  200 mg      DOBUTamine in D5W  0-20 mcg/kg/min (Ideal) 2.5 mcg/kg/min (12/11/23 1939)    HYDROmorphone  1 mg      senna-docusate  2 Tablet      And    polyethylene glycol/lytes  1 Packet      And    magnesium hydroxide  30 mL      And    bisacodyl  10 mg      oxyCODONE immediate-release  15 mg      albuterol  2.5 mg      cefTRIAXone (ROCEPHIN) IV  2,000 mg      dakins 0.125% (1/4 strength)        omeprazole  20 mg      DULoxetine  60 mg          Procedures:  12/6: Open reduction of R subtalar and talonavicular joint dislocation, R foot triple arthrodesis, R achilles tendon lengthening, application of multiplanar external fixator RLE  12/9: Central and A lines placed    Imaging:  DX-CHEST-PORTABLE (1 VIEW)   Final Result         1.  Coronary edema and/or infiltrates, stable since prior study.   2.  Trace right pleural effusion, stable   3.  Cardiomegaly      US-EXTREMITY VENOUS LOWER BILAT   Final Result      EC-ECHOCARDIOGRAM COMPLETE W/ CONT   Final Result      US-RUQ   Final Result         1. Hepatic steatosis.   2. Otherwise, normal.      DX-CHEST-PORTABLE (1 VIEW)   Final Result         1. [place] catheter tip is located within a right lower lobe pulmonary arterial branch, repositioning may be indicated.   2. Stable pulmonary edema, cardiomegaly, and small pleural effusions.      DX-CHEST-PORTABLE (1 VIEW)   Final Result      1.  Small right pleural effusion with adjacent airspace disease.   2.  Worsening interstitial infiltrates and/or edema.      DX-CHEST-LIMITED (1 VIEW)   Final Result      1.  Enlarged cardiac silhouette with changes of vascular congestion.      DX-PORTABLE FLUORO > 1 HOUR   Final Result      Portable fluoroscopy utilized for 1 minute 48 seconds.          INTERPRETING LOCATION: [address], [place], [address]      DX-FOOT-COMPLETE 3+ RIGHT   Final Result      Digitized intraoperative radiograph is submitted for review. This examination is not for diagnostic purpose but for guidance during a surgical procedure. Please see the patient's chart for full procedural details.         INTERPRETING LOCATION: [address], [place], [address]          ASSESSEMENT and PLAN:    * Cardiogenic shock (HCC)  Assessment & Plan  Admitted to the ICU 12/09/23 with suspected BiV failure    - [place] guided therapy  - Dobutamine for CI > 2.2, has needed 2.5 as CI down to 1.8 when off  - Changed IV Lasix to 40mg bid  - Goal MAP > 65    Elevated LFTs  Assessment & Plan  Secondary to shock liver    - Improving  - Maintain adequate perfusion  - RUQ US reassuring  - Hepatitis panel negative  - Avoid hepatotoxins    Immunosuppression (HCC)  Assessment & Plan  Holding Humira for now given infected left lower extremity    Dislocation of right subtalar joint  Assessment & Plan  He underwent open reduction of right subtalar joint dislocation, right talonavicular joint dislocation right foot triple arthrodesis, right extensor tendon lengthening and application of multiplanar external fixator right lower extremity on 12/6 by ortho  Per orthopedic surgery, patient will be nonweightbearing to toe-touch weightbearing for transfer on the right lower extremity  - PT/OT when able  - DVTppx with Lovenox  - Pain regimen  - Wound care    Hx of gastric bypass- (present on admission)  Assessment & Plan  Encourage ongoing weight loss management    Hypokalemia- (present on admission)  Assessment & Plan  Replace as needed    Acute kidney injury (HCC)- (present on admission)  Assessment & Plan  RESOLVED  Secondary to cardiogenic shock/ATN   UOP and renal function improved with diuretics     - Strict I/Os  - Renally dose meds  - Avoid nephrotoxic agents as able  - Continue to monitor    Acute combined systolic and  diastolic heart failure (HCC)- (present on admission)  Assessment & Plan  Last echo from October 20, 2023 with EF 55%, grade 1 diastolic dysfunction with moderate AAS/AI and RVSP of 45 mmHg --> now in biventricular failure as seen on POCUS per Dr. Gonda 12/9    - Repeat echo 12/9 with EF 45% and severe AS  - [place] guided therapy  - Hold GDMT given shock state  - Cardiology following    S/P BKA (below knee amputation), left (HCC)- (present on admission)  Assessment & Plan  With infected stump  - Continue C3 and Linzolid for skin infection  - Blood cultures NGTD so far  - Will obtain wound cx  - Plan for I&D of left BKA when stabilized  - Continue wound care    Hyponatremia- (present on admission)  Assessment & Plan  Likely 2/2 heart failure  Monitor with above therapies    Chronic pain- (present on admission)  Assessment & Plan  Continue home meds    Atrial fibrillation with RVR (HCC)- (present on admission)  Assessment & Plan  History of A-fib, went into AVR 12/8, converted back to normal sinus rhythm 12/9, now back in afib 12/10    - Continued PO amiodarone  - Echo 12/9 showing EF 45% and severe AS  - Replace electrolytes prn    Sepsis (HCC)- (present on admission)  Assessment & Plan  This is Septic shock Not present on admission  SIRS criteria identified on my evaluation include: Tachycardia, with heart rate greater than 90 BPM, Tachypnea, with respirations greater than 20 per minute, and Leukocytosis, with WBC greater than 12,000  Clinical indicators of end organ dysfunction include Hypotension with systolic blood pressure less than 90 or MAP less than 65  Indicators of septic shock include: Sepsis present and persistent hypotension despite fluid resuscitation   Sources is: Soft tissue  S/p sepsis protocol initiated 12/8  Crystalloid Fluid Administration: Resuscitation volume of 2 L ordered. Reason that resuscitation volume of less than 30ml/kg was ordered concern for causing harm given CHF  IV antibiotics as  appropriate for source of sepsis  Reassessment: I have reassessed the patient's hemodynamic status  Monitor lactic acid, urine output, vital signs closely for adequacy of resuscitation    - Linezolid + ceftriaxone  - Previous wound culture from 10/2023 grew staph and strep  - Blood cultures from this admission NGTD   - Wound cx ordered  - Goal MAP > 65 using vasopressors     Aortic stenosis due to bicuspid aortic valve- (present on admission)  Assessment & Plan  Moderate on last echo in October 2023  - Repeat echo revealing severe AS  - Maintain euvolemia    Rheumatoid arthritis (HCC)- (present on admission)  Assessment & Plan  As needed analgesics  Holding immunosuppressants for now in the setting of infection      DISPO: Continued ICU stay, pt requiring pressors    CODE STATUS: Full Code    Quality Measures:  Feeding: Cardiac diet  Analgesia: PRN oxycodone and dilaudid  Sedation: N/A  Thromboprophylaxis: Lovenox 40mg qd  Head of bed: >30 degrees  Ulcer prophylaxis: N/A  Glycemic control: N/A  Bowel care: bowel regimen in place  Indwelling lines: Central line, [place], A line, PIVs, piedra  Deescalation of antibiotics: Linezolid and C3 for wound infection likely source of sepsis      Aden Carias M.D. PGY-2

## 2023-12-12 NOTE — CARE PLAN
The patient is Watcher - Medium risk of patient condition declining or worsening    Shift Goals  Clinical Goals: CI > 2.2, hemodynamic stability  Patient Goals: rest    Problem: Hemodynamics  Goal: Patient's hemodynamics, fluid balance and neurologic status will be stable or improve  Outcome: Progressing  Back on levo and back on dobutamine     Problem: Fluid Volume  Goal: Fluid volume balance will be maintained  Outcome: Progressing  700ml UOP     Problem: Pain - Standard  Goal: Alleviation of pain or a reduction in pain to the patient’s comfort goal  Outcome: Not Progressing- consistently at 7 on pain scale, could benefit from additional PRNS/increased frequency or varied dosages

## 2023-12-12 NOTE — PROGRESS NOTES
Cardiology Progress Note    Date of Service  12/12/2023    Attending Physician  Danny Dobbs M.D.    Events/Subjective  Less diuresis yesterday, however, Cr continues to improve and still making light colored urine.  Remains in AF/AFL  Remains on low-dose DBT, NE    Vitals  Temp:  [36 °C (96.8 °F)-36.8 °C (98.2 °F)] 36.2 °C (97.2 °F)  Pulse:  [] 98  Resp:  [8-36] 16  BP: ()/(49-64) 90/50  SpO2:  [87 %-98 %] 94 %    Physical Exam  General: Chronically ill-appearing, but in no acute distress  Eyes: Extraocular movements intact, anicteric  Neck: Full range of motion, unable to assess JVP with PA catheter in place, last CVP 9 mmHg  Pulmonary: Normal respiratory effort, remains on supplemental oxygen  Cardiovascular: Irregular, borderline tachycardic  Gastrointestinal: Obese, nondistended  Extremities: Left lower extremity BKA, right lower extremity in fixation device  Neurological: Alert and oriented, normal speech    Laboratories  Lab Results   Component Value Date/Time    WBC 10.6 12/12/2023 04:40 AM    WBC 8.4 10/19/2010 12:05 PM    RBC 3.54 (L) 12/12/2023 04:40 AM    RBC 4.96 10/19/2010 12:05 PM    HEMOGLOBIN 9.4 (L) 12/12/2023 04:40 AM    HEMATOCRIT 30.1 (L) 12/12/2023 04:40 AM    MCV 85.0 12/12/2023 04:40 AM    MCV 85 10/19/2010 12:05 PM    MCH 26.6 (L) 12/12/2023 04:40 AM    MCH 28.4 10/19/2010 12:05 PM    MCHC 31.2 (L) 12/12/2023 04:40 AM    MPV 11.2 12/12/2023 04:40 AM      Lab Results   Component Value Date/Time    SODIUM 136 12/12/2023 04:40 AM    POTASSIUM 5.0 12/12/2023 04:40 AM    CHLORIDE 97 12/12/2023 04:40 AM    CO2 27 12/12/2023 04:40 AM    GLUCOSE 103 (H) 12/12/2023 04:40 AM    BUN 31 (H) 12/12/2023 04:40 AM    CREATININE 0.93 12/12/2023 04:40 AM    CREATININE 0.95 10/19/2010 12:05 PM    BUNCREATRAT 9 10/19/2010 12:05 PM    GLOMRATE >59 10/19/2010 12:05 PM      Lab Results   Component Value Date/Time    ASTSGOT 764 (H) 12/12/2023 04:40 AM    ALTSGPT 307 (H) 12/12/2023 04:40 AM     Lab  "Results   Component Value Date/Time    CHOLSTRLTOT 130 01/28/2021 03:30 PM    LDL 48 01/28/2021 03:30 PM    HDL 68 01/28/2021 03:30 PM    TRIGLYCERIDE 72 01/28/2021 03:30 PM    TROPONINT 27 (H) 12/08/2023 10:03 PM       No results for input(s): \"NTPROBNP\" in the last 72 hours.    Telemetry  Atrial fibrillation/flutter    Studies  TTE (12/9/2023)  The left ventricle is mildly dilated. The left ventricular ejection fraction is visually estimated to be 45%.  Mildly reduced left ventricular systolic function.  Normal right ventricular size and systolic function.  Moderately dilated left atrium  Aortic valve not well viauzlied; possibly bicuspid aortic valve.  Severe aortic valve stenosis.  Moderate to severe aortic insufficiency.  Moderate to severe mitral regurgitation.  Mild tricuspid regurgitation.   Right atrial pressure is estimated to be 15 mmHg. Estimated right ventricular systolic pressure is 66 mmHg; severely elevated.  The ascending aorta is moderately dilated with a diameter of 4.5 cm.    Assessment  # Probable mixed septic and cardiogenic shock  # Atrial fibrillation/flutter with RVR  # Severe bicuspid aortic valve stenosis/regurgitation  # Moderate to severe mitral regurgitation  # Acute heart failure with reduced ejection fraction (LVEF 45%)  # Cardiogenic shock developed in the setting of atrial fibrillation with RVR and severe bicuspid aortic valve stenosis.  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 bid, may spontaneously convert to sinus when inotropic drips weaned  -Start " NOAC when no contraindication, IV heparin currently on hold for bloody sputum  -Continue intravenous diuresis with furosemide 40 mg bid  -Continue spironolactone 25 mg daily  -Daily BMP and aggressive potassium repletion hold diuresis      Cardiology will continue to follow.  Please contact me with any questions.    Romaine Clements M.D.   Cardiologist, [facility], formerly [facility]

## 2023-12-12 NOTE — THERAPY
Consent: Prior to the procedure, written consent was obtained and risks were reviewed, including but not limited to: redness, peeling, blistering, pigmentary change, scarring, infection, and pain. Physical Therapy   Daily Treatment     Patient Name: Richard Hubbard II  Age:  45 y.o., Sex:  male  Medical Record #: [id number]  Today's Date: 8/7/2020     Precautions  Precautions: Fall Risk, Weight Bearing As Tolerated Right Lower Extremity  Comments: No pelvic precautions, ok for stretching/ROM and strengthening    Subjective    Pt ambulates to therapy with FWW modified independent     Objective       08/07/20 1301   Gait Functional Level of Assist    Gait Level Of Assist Modified Independent   Stairs Functional Level of Assist   Level of Assist with Stairs Contact Guard Assist   # of Stairs Climbed 4   Transfer Functional Level of Assist   Bed, Chair, Wheelchair Transfer Modified Independent   Supine Lower Body Exercise   Chest Fly 3 sets of 15  (4# dumbbell wts)   Chest Press 3 sets of 15  (8# wieghted dowel)   Straight Leg Raises 2 sets of 10  (cues for quad sets prior to lift)   Knee to Chest 2 sets of 10  (hamstring curls on therapy ball)   Heel Slide 2 sets of 10  (sheet around foot for AAROM)   Other Exercises pt completed UE strength training while ice to R knee post ROM x 15 minutes.    Sitting Lower Body Exercises   Other Exercises AAROM with floor pedals x 5 minutes to complete forward/backward revolutions as able.    Standing Lower Body Exercises   Mini Lunge 2 sets of 10  (RLE extension stretch/cues for quad set)   Neuro-Muscular Treatments   Neuro-Muscular Treatments Facilitation;Biofeedback;Anterior weight shift;Tactile Cuing   Comments neuro re-ed stretgth training for R quad sets/ lunges for stretching and manual joint oscillations for pain control/fluid mobilization and muscle relaxation.    PT Total Time Spent   PT Individual Total Time Spent (Mins) 90   PT Charge Group   PT Gait Training 1   PT Therapeutic Exercise 4   PT Neuromuscular Re-Education / Balance 1     Pt completed 2 steps without rails (similar to home environment) with FWW and Adore for safety and walker positioning/  Number Of Layers: 1 stabilization.     Assessment    Pt remains with limited R knee ROM due to significant swelling/ edema but willing to push self with ROM/ stretching exercises. Requires tactile cues for full quad set activation during strength training exercises as noted. Recommend grab bar/ rail for safe access to home, however pt reports utilizing door frame to assist with access to home.     Strengths: Supportive family, Motivated for self care and independence  Barriers: Limited mobility, Impaired balance, Impaired activity tolerance, Pain, Pain poorly managed, Decreased endurance, Fatigue    Plan    D/c tomorrow with home health PT.     Physical Therapy Problems     Problem: PT-Long Term Goals     Dates: Start: 07/28/20       Goal: LTG-By discharge, patient will ambulate     Dates: Start: 07/28/20       Description: 1) Individualized goal:  Patient will amb >50 ft indoors mod I with FWW  2) Interventions:  PT Gait Training, PT Self Care/Home Eval, PT Therapeutic Exercises, PT Neuro Re-Ed/Balance, PT Aquatic Therapy, PT Therapeutic Activity, PT Manual Therapy, and PT Evaluation            Goal: LTG-By discharge, patient will transfer one surface to another     Dates: Start: 07/28/20       Description: 1) Individualized goal: Patient will transfer Mod I with FWW STS/SPT   2) Interventions:  PT Gait Training, PT Self Care/Home Eval, PT Therapeutic Exercises, PT Neuro Re-Ed/Balance, PT Aquatic Therapy, PT Therapeutic Activity, PT Manual Therapy, and PT Evaluation            Goal: LTG-By discharge, patient will ambulate up/down 4-6 stairs     Dates: Start: 07/28/20       Description: 1) Individualized goal:  Patient will amb up/down 2 standard stairs with no railing CGA  2) Interventions:  PT Gait Training, PT Self Care/Home Eval, PT Therapeutic Exercises, PT Neuro Re-Ed/Balance, PT Aquatic Therapy, PT Therapeutic Activity, PT Manual Therapy, and PT Evaluation                       Treatment Number: 0 Price (Use Numbers Only, No Special Characters Or $): 110 Chemical Peel: ALIX (GA 15%, TCA 15%, Zinc 2%) Post-Care Instructions: I reviewed with the patient in detail post-care instructions. Patient should avoid sun exposure and wear sun protection. Post Peel Care: post-care instructions were reviewed with the patient. Detail Level: Zone

## 2023-12-12 NOTE — DISCHARGE PLANNING
Case Management Discharge Planning    Admission Date: 12/6/2023  GMLOS: 7.3  ALOS: 6    6-Clicks ADL Score: 22  6-Clicks Mobility Score: 10  PT and/or OT Eval ordered: Yes  Post-acute Referrals Ordered: No  Post-acute Choice Obtained: Yes  Has referral(s) been sent to post-acute provider:  No      Anticipated Discharge Dispo: Discharge Disposition: Disch to  rehab facility or distinct part unit (62)  Discharge Address: [address] [facility] [facility] [address] [facility]   [place] [address]    DME Needed: No    Action(s) Taken: DC Assessment Complete (See below)    LSW completed assessment and verified information on face sheet. Pt lives with his wife and 2 children under the age of 10 in a single-story house that has no steps to enter. Pt verified [address] as [address] [facility] [address] [facility], [place] [address]. Pt PCP is Dr. Johnathan Dover. Prior to this hospitalization pt was independent at home with most ADLs and IADLs. Prior to admission pt uses a walker or a wheel chair for mobility. Pt was able to transfer to his wheel chair independently. Pt has good support from his wife and mother. Pt denies any SA or MH concerns. Pt does not have an advance directive. Pt is on service with [facility].     Community Resources for Social Determinants added to AVS.     Escalations Completed: None    Medically Clear: No    Next Steps:  f/u with pt and medical team to discuss dc needs and barriers.     Barriers to Discharge: Medical clearance and Pending Placement    Is the patient up for discharge tomorrow: No      Care Transition Team Assessment    Information Source  Orientation Level: Oriented X4  Who is responsible for making decisions for patient? : Patient    Readmission Evaluation  Is this a readmission?: No    Elopement Risk  Legal Hold: No  Ambulatory or Self Mobile in Wheelchair: No-Not an Elopement Risk  Disoriented: No  Psychiatric Symptoms: None  History of Wandering: No  Elopement this Admit: No  Vocalizing Wanting to Leave: No  Displays Behaviors, Body Language Wanting  to Leave: No-Not at Risk for Elopement  Elopement Risk: Not at Risk for Elopement    Interdisciplinary Discharge Planning  Lives with - Patient's Self Care Capacity: Spouse, Child Less than 18 Years of Age  Patient or legal guardian wants to designate a caregiver: No  Support Systems: Spouse / Significant Other  Housing / Facility: [address] [facility]  Prior Services: Home-Independent  Durable Medical Equipment: Other - Specify (wheelchair)    Discharge Preparedness  What is your plan after discharge?: Skilled nursing facility  What are your discharge supports?: Spouse, Parent  Prior Functional Level: Independent with Activities of Daily Living, Independent with Medication Management, Uses Wheelchair, Uses Walker  Difficulity with ADLs: Walking  Difficulity with IADLs: None    Functional Assesment  Prior Functional Level: Independent with Activities of Daily Living, Independent with Medication Management, Uses Wheelchair, Uses Walker    Finances  Financial Barriers to Discharge: No  Prescription Coverage: Yes    Vision / Hearing Impairment  Vision Impairment : Yes  Right Eye Vision: Impaired, Wears Glasses  Left Eye Vision: Impaired, Wears Glasses  Hearing Impairment : No    Advance Directive  Advance Directive?: None    Domestic Abuse  Have you ever been the victim of abuse or violence?: No  Physical Abuse or Sexual Abuse: No  Verbal Abuse or Emotional Abuse: No  Possible Abuse/Neglect Reported to:: Not Applicable    Psychological Assessment  History of Substance Abuse: None  History of Psychiatric Problems: No  Non-compliant with Treatment: No    Discharge Risks or Barriers  Patient risk factors: Complex medical needs    Anticipated Discharge Information  Discharge Disposition: Disch to  rehab facility or distinct part unit (62)  Discharge Address: [facility] [place] [address]

## 2023-12-12 NOTE — THERAPY
"Occupational Therapy   Initial Evaluation     Patient Name: Richard Hubbard II  Age:  48 y.o., Sex:  male  Medical Record #: [id number]  Today's Date: 12/12/2023     Precautions: Fall Risk, Non Weight Bearing Right Lower Extremity, Non Weight Bearing Left Lower Extremity  Comments: RLE \"okay for touchdown as needed for transfers on the frame\" per Ortho; wound to LLE terminal residual limb    Assessment    Patient is 48 y.o. male with h/o a-fib, HTN, bipolar, MONICA, obesity, L BKA, grade 1 diastolic dysfunction with aortic stenosis, admitted for elective reconstruction of L ankle on 12/6 due to deformity/dislocation. Course complicated by: acute respiratory failure, acute renal failure, elevated liver enzymes, wound to L BKA. Plan for I&D to LLE. Pt seen for OT eval. See grid below for details. Pt limited by cognition, pain, bradykinetic motor with incoordination, balance impairment, WB prec, high-profile ex-fix. Able to mobilize to EOB and participate in grooming task. Plan to introduce slide board for seated transfers. Pt will benefit from ongoing acute OT to maximize functional independence and safety.     Plan    Occupational Therapy Initial Treatment Plan   Treatment Interventions: Self Care / Activities of Daily Living, Adaptive Equipment, Cognitive Skill Development, Neuro Re-Education / Balance, Therapeutic Exercises, Therapeutic Activity  Treatment Frequency: 3 Times per Week  Duration: Until Therapy Goals Met    DC Equipment Recommendations: Unable to determine at this time  Discharge Recommendations: Recommend post-acute placement for additional occupational therapy services prior to discharge home     Subjective    \"Not too good.\" (when asked how he was feeling)     Objective       12/12/23 1450   Prior Living Situation   Prior Services Unable To Determine At This Time   Housing / Facility [address] [facility]   Steps Into Home 0   Steps In Home   (ramp in place)   Bathroom Set up   (unable to determine) " "  Equipment Owned Front-Wheel Walker;4-Wheel Walker;Wheelchair  (per PT note)   Lives with - Patient's Self Care Capacity Spouse;Child Less than 18 Years of Age  (per PT note)   Comments Pt is limited historian this session due to confusion, very delayed responses   Prior Level of ADL Function   Comments Unable to determine due to confusion, delay   Vitals   Pulse (!) 115   (fluctuates one-teens to 120s; h/o a-fib)   Blood Pressure (!) 84/46   Pulse Oximetry 93 %   O2 (LPM) 0   O2 Delivery Device None - Room Air   Cognition    Speech/ Communication Delayed Responses  (profound delay)   Orientation Level Not Oriented to Year;Not Oriented to Day;Not Oriented to Time   Level of Consciousness Alert  (eyes open, but lethargic and very slow to respond)   New Learning Impaired   Attention Impaired   Sequencing Impaired   Comments Pt extremely slow to respond, frequently stating \"Um...uh...\" but unable to answer inquiry; OT unable to determine PLOF   Active ROM Upper Body   Active ROM Upper Body  WDL   Dominant Hand Right   Strength Upper Body   Gross Strength Generalized Weakness, Equal Bilaterally.    Sensation Upper Body   Comments denies sensory changes   Coordination Upper Body   Comments bradykinetic motor BUE negatively impacts functional use   Balance Assessment   Sitting Balance (Static) Fair   Sitting Balance (Dynamic) Fair -   Weight Shift Sitting Poor   Comments unable to stand due to WB precautions; forward flexed cervical and thoracic spine with lateral kyphosis   Bed Mobility    Supine to Sit Maximal Assist   Sit to Supine Maximal Assist   Scooting Maximal Assist  (seated)   ADL Assessment   Grooming Moderate Assist;Seated  (oral care, face wipe EOB)   Lower Body Dressing   (NT; ex-fix to distal RLE; dressing to L residual limb)   Toileting   (NT; Rivera, no BM)   Functional Mobility   Sit to Stand Unable to Participate   Bed, Chair, Wheelchair Transfer Unable to Participate   Mobility Bed mobility, sitting " balance   Edema / Skin Assessment   Comments gauze dressing to LLE residual limb; ex-fix to R lower leg/foot   Activity Tolerance   Sitting in Chair unable   Sitting Edge of Bed 10 min   Standing unable   Short Term Goals   Short Term Goal # 1 Pt will complete slide board transfer to/from  with mod A   Short Term Goal # 2 Pt will change gown EOB with set-up   Short Term Goal # 3 Pt will use urinal EOB with set-up   Education Group   Role of Occupational Therapist Patient Response Patient;Acceptance;Explanation;Reinforcement Needed;Verbal Demonstration   Joint Protection Patient Response Patient;Acceptance;Explanation;Verbal Demonstration;Action Demonstration;Reinforcement Needed  (active extension to L knee (BKA))   Transfers Patient Response Patient;Acceptance;Explanation;Verbal Demonstration;Reinforcement Needed  (plan to introduce slide board for seated transfers)   Weight Bearing Precautions Patient Response Patient;Acceptance;Explanation;Verbal Demonstration;Reinforcement Needed  (NWB BLE)

## 2023-12-12 NOTE — CARE PLAN
The patient is Watcher - Medium risk of patient condition declining or worsening    Shift Goals  Clinical Goals: CI > 2.2, hemodynamic stability  Patient Goals: rest  Family Goals: elias      Problem: Pain - Standard  Goal: Alleviation of pain or a reduction in pain to the patient’s comfort goal  Outcome: Progressing     Problem: Hemodynamics  Goal: Patient's hemodynamics, fluid balance and neurologic status will be stable or improve  Outcome: Progressing

## 2023-12-12 NOTE — PROGRESS NOTES
Lab sampling unable to locate my potassium sample. Will redraw and resend for another K for K scale.

## 2023-12-13 NOTE — PROGRESS NOTES
Critical Care Progress Note    Date of admission  12/6/2023    Chief Complaint  48 y.o. male admitted 12/6/2023 with a dislocated right subtalar joint.  He has a history of a bicuspid aortic valve with aortic stenosis, left BKA, PAF, rheumatoid arthritis, chronic pain, bipolar disorder.    Hospital Course      12/9 admitted to the ICU for cardiogenic shock and organ dysfunction s/p IVF  12/10 improved hemodynamics and organ dysfunction; less vasopressors/inotropic support    12/11 -    titrating dobutamine.  Force diuresis.  Replete potassium.  Continue ceftriaxone and linezolid.  12/12 -    titrating dobutamine.  Titrating norepinephrine.  Force diuresis.  Continue ceftriaxone and linezolid.  12/13 -    dobutamine has been successfully titrated off.  Titrating norepinephrine.  Continue diuresis.  Change ceftriaxone and linezolid to cefepime.  Wound culture with Serratia species.      Interval Problem Update  Reviewed last 24 hour events:      SR  Dobutamine off  NE titrating  98.6  -2335 mL in the last 24  -3590 mL since admit      Review of Systems  Review of Systems   Unable to perform ROS: Acuity of condition        Vital Signs for last 24 hours   Temp:  [36.7 °C (98.1 °F)-37 °C (98.6 °F)] 36.9 °C (98.4 °F)  Pulse:  [] 85  Resp:  [9-41] 17  BP: ()/(42-59) 95/51  SpO2:  [89 %-98 %] 90 %    Hemodynamic parameters for last 24 hours  CVP:  [6 MM HG-14 MM HG] 6 MM HG  PCWP:  [21 MM HG-33 MM HG] 33 MM HG  CO:  [3.9-7.5] 3.9  CI:  [1.79-3.44] 1.79    Respiratory Information for the last 24 hours       Physical Exam   Physical Exam  Constitutional:       Appearance: He is not diaphoretic.   HENT:      Head: Normocephalic.      Mouth/Throat:      Pharynx: Oropharynx is clear.   Eyes:      Pupils: Pupils are equal, round, and reactive to light.   Cardiovascular:      Comments: Sinus rhythm  Pulmonary:      Breath sounds: Rales (Unchanged crackles) present. No wheezing.   Abdominal:      General: There is no  distension.      Tenderness: There is no abdominal tenderness.   Musculoskeletal:      Comments: Left BKA.  Left BKA wound dressed.  External fixator on the right lower extremity.   Skin:     General: Skin is warm.   Neurological:      General: No focal deficit present.      Mental Status: He is oriented to person, place, and time.      Cranial Nerves: No cranial nerve deficit.         Medications  Current Facility-Administered Medications   Medication Dose Route Frequency Provider Last Rate Last Admin    cefepime (Maxipime) 2 g in  mL IVPB  2 g Intravenous Q8HRS You Moore M.D.   Stopped at 12/13/23 1041    lactulose 20 GM/30ML solution 30 mL  30 mL Oral TID You Moore M.D.   30 mL at 12/13/23 1142    potassium chloride SA (Kdur) tablet 20 mEq  20 mEq Oral BID You Moore M.D.        furosemide (Lasix) injection 40 mg  40 mg Intravenous BID DIURETIC You Moore M.D.   40 mg at 12/13/23 0518    spironolactone (Aldactone) tablet 25 mg  25 mg Oral Q DAY Romaine Clements M.D.   25 mg at 12/13/23 0516    norepinephrine (Levophed) 8 mg in 250 mL NS infusion (premix)  0-1 mcg/kg/min (Ideal) Intravenous Continuous You Moore M.D. 4.2 mL/hr at 12/12/23 1500 0.03 mcg/kg/min at 12/12/23 1500    MD Alert...ICU Electrolyte Replacement per Pharmacy   Other PHARMACY TO DOSE Danny Dobbs M.D.        enoxaparin (Lovenox) inj 40 mg  40 mg Subcutaneous DAILY AT 1800 Danny Dobbs M.D.   40 mg at 12/12/23 1712    amiodarone (Cordarone) tablet 400 mg  400 mg Oral TWICE DAILY Danny Dobbs M.D.   400 mg at 12/13/23 0600    Followed by    [START ON 12/17/2023] amiodarone (Cordarone) tablet 400 mg  400 mg Oral Q DAY Danny Dobbs M.D.        Followed by    [START ON 12/24/2023] amiodarone (Cordarone) tablet 200 mg  200 mg Oral Q DAY Danny Dobbs M.D.        HYDROmorphone (Dilaudid) injection 1 mg  1 mg Intravenous Q4HRS PRN Danny Dobbs M.D.   1  mg at 12/13/23 0822    senna-docusate (Pericolace Or Senokot S) 8.6-50 MG per tablet 2 Tablet  2 Tablet Oral BID Danny Dobbs M.D.   2 Tablet at 12/13/23 0516    And    polyethylene glycol/lytes (Miralax) Packet 1 Packet  1 Packet Oral QDAY PRN Danny Dobbs M.D.   1 Packet at 12/13/23 0518    And    magnesium hydroxide (Milk Of Magnesia) suspension 30 mL  30 mL Oral QDAY PRN Danny Dobbs M.D.   30 mL at 12/13/23 0516    And    bisacodyl (Dulcolax) suppository 10 mg  10 mg Rectal QDAY PRN Danny Dobbs M.D.        oxycodone (Oxy-IR) immediate release tablet 15 mg  15 mg Oral Q6HRS PRN Gil Dalton M.D.   15 mg at 12/13/23 0341    albuterol (Proventil) 2.5mg/0.5ml nebulizer solution 2.5 mg  2.5 mg Nebulization Q4H PRN (RT) Gil Dalton M.D.        dakins 0.125% (1/4 strength) topical soln   Topical BID Calvin Pryor M.D.   1 mL at 12/13/23 0600    omeprazole (PriLOSEC) capsule 20 mg  20 mg Oral DAILY Gil Dalton M.D.   20 mg at 12/13/23 0518    DULoxetine (Cymbalta) capsule 60 mg  60 mg Oral DAILY Torsten Grove M.D.   60 mg at 12/13/23 0521       Fluids    Intake/Output Summary (Last 24 hours) at 12/13/2023 1159  Last data filed at 12/13/2023 1000  Gross per 24 hour   Intake 764.23 ml   Output 4265 ml   Net -3500.77 ml       Laboratory          Recent Labs     12/11/23  0455 12/11/23  1205 12/12/23  0440 12/12/23  1229 12/12/23  1720 12/12/23  2330 12/13/23  0450   SODIUM 133*  --  136  --   --   --  136   POTASSIUM 3.3*   < > 5.0   < > 4.9 4.4 4.2   CHLORIDE 94*  --  97  --   --   --  99   CO2 28  --  27  --   --   --  29   BUN 32*  --  31*  --   --   --  25*   CREATININE 1.03  --  0.93  --   --   --  0.68   MAGNESIUM 2.4  --  2.2  --   --   --  2.1   PHOSPHORUS 1.9*  --  2.4*  --   --   --  2.6   CALCIUM 7.5*  --  7.6*  --   --   --  7.6*    < > = values in this interval not displayed.     Recent Labs     12/11/23  0455 12/12/23  0440 12/13/23  0450   ALTSGPT 332* 307* 186*    ASTSGOT 1323* 764* 269*   ALKPHOSPHAT 225* 230* 183*   TBILIRUBIN 1.3 1.0 1.0   GLUCOSE 111* 103* 94     Recent Labs     12/11/23  0455 12/12/23  0440 12/13/23  0450   WBC 7.9 10.6 8.9   NEUTSPOLYS 88.90* 79.40* 75.40*   LYMPHOCYTES 5.20* 12.40* 16.70*   MONOCYTES 5.30 7.60 7.20   EOSINOPHILS 0.00 0.00 0.00   BASOPHILS 0.10 0.10 0.10   ASTSGOT 1323* 764* 269*   ALTSGPT 332* 307* 186*   ALKPHOSPHAT 225* 230* 183*   TBILIRUBIN 1.3 1.0 1.0     Recent Labs     12/11/23  0455 12/12/23 0440 12/13/23 0450   RBC 3.21* 3.54* 3.39*   HEMOGLOBIN 8.6* 9.4* 9.2*   HEMATOCRIT 26.8* 30.1* 29.5*   PLATELETCT 123* 125* 103*       Imaging  None    Assessment/Plan  * Cardiogenic shock (HCC)  Assessment & Plan  He is off dobutamine  I am titrating norepinephrine to keep mean arterial pressure greater than 65  Continue Mobile-Aung hemodynamic measurements with interpretation    Sepsis (HCC)- (present on admission)  Assessment & Plan  Developed after admission  Skin and soft tissue source  Change antibiotics to cefepime    Pulmonary hypertension (HCC)  Assessment & Plan  WHO group 2 (combined systolic and diastolic heart failure, severe aortic stenosis/regurgitation, moderate to severe mitral regurgitation)  RVSP 66 mmHg  Force diuresis with furosemide    Elevated LFTs  Assessment & Plan  Due to ischemic hepatopathy from heart failure  Trend liver enzymes and synthetic function  Avoid hepatotoxins  Improving    Dislocation of right subtalar joint  Assessment & Plan  S/P ORIF on 12/6    Acute kidney injury (HCC)- (present on admission)  Assessment & Plan  Monitor renal function and urine output  Avoid nephrotoxins and renal dose medications  Resolved    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)- (present on admission)  Assessment & Plan  LVEF 45%  Grade 3 diastolic dysfunction  Force diuresis with furosemide  He is off dobutamine  I am titrating norepinephrine to keep mean arterial pressure greater than 65    S/P BKA  (below knee amputation), left ([facility])- (present on admission)  Assessment & Plan  Infected left BKA stump  Wound culture with Serratia species  Stop ceftriaxone and linezolid and begin cefepime  Plan for I&D when medically improved  Wound care    Chronic pain- (present on admission)  Assessment & Plan  Continue as needed analgesics    Atrial fibrillation with RVR ([facility])- (present on admission)  Assessment & Plan  Continue amiodarone, 400 mg twice daily  Optimize potassium and magnesium  He is now in sinus rhythm    Aortic stenosis due to bicuspid aortic valve- (present on admission)  Assessment & Plan  Severe aortic stenosis due to bicuspid aortic valve  Mean valve gradient 48 mmHg  Associated moderate to severe aortic insufficiency  Force diuresis with furosemide    Immunosuppression ([facility])  Assessment & Plan  Hold adalimumab with infected left BKA stump    Hx of gastric bypass- (present on admission)  Assessment & Plan       Rheumatoid arthritis ([facility])- (present on admission)  Assessment & Plan  Hold adalimumab due to infected left BKA stump         VTE:  Lovenox  Ulcer: Not Indicated  Lines: Central Line  Ongoing indication addressed, Arterial Line  Ongoing indication addressed, and Rivera Catheter  Ongoing indication addressed    I have performed a physical exam and reviewed and updated ROS and Plan today (12/13/2023). In review of yesterday's note (12/12/2023), there are no changes except as documented above.     I have assessed and reassessed his respiratory status, hemodynamics, blood pressure, cardiovascular status with titration of norepinephrine.  He is at increased risk for worsening respiratory, cardiovascular, hepatic and renal system dysfunction.    Discussed patient condition and risk of morbidity and/or mortality with RN, RT, Pharmacy, Charge nurse / hot rounds, and QA team    The patient remains critically ill.  Critical care time = 35 minutes in directly providing and coordinating critical care and  extensive data review.  No time overlap and excludes procedures.    You Moore MD  Pulmonary and Critical Care Medicine

## 2023-12-13 NOTE — PROGRESS NOTES
Cardiac Numbers    0200 (Levo 0.04)    CO 4.8  CI 2.2  CVP 7  PCWP 21  SVR 1114  PA Mean 50    0400 (Levo 0.02)    CO 5.0  CI 2.3  CVP 10  PCWP 22    PA Mean 45

## 2023-12-13 NOTE — PROGRESS NOTES
Bedside report received, patient care assumed. Pt art line with low diastolic/MAP, levo increased per order. Pt reports no needs at this time.

## 2023-12-13 NOTE — CARE PLAN
The patient is Watcher - Medium risk of patient condition declining or worsening    Shift Goals  Clinical Goals: CI > 2.2, hemodynamic stability  Patient Goals: rest  Family Goals: elias    Progress made toward(s) clinical / shift goals:      Problem: Fall Risk  Goal: Patient will remain free from falls  Outcome: Progressing     Problem: Knowledge Deficit - Standard  Goal: Patient and family/care givers will demonstrate understanding of plan of care, disease process/condition, diagnostic tests and medications  Outcome: Progressing     Problem: Pain - Standard  Goal: Alleviation of pain or a reduction in pain to the patient’s comfort goal  Outcome: Progressing

## 2023-12-13 NOTE — PROGRESS NOTES
[facility] ICU Resident Progress Note      Admit Date: 12/6/2023    Resident(s): Aden Carias M.D.   Attending:  RADHA MIRANDA/ Dr. Moore    Patient ID:    Name:  Richrad Hubbard   YOB: 1975  Age:  48 y.o.  male   MRN:  [id number]    Hospital Course (carried forward and updated):  Richard Hubbard II is a 48 y.o. male with hx of afib, RA, bipolar, obesity, MONICA, and grade 1 diastolic dysfunction with moderate AS and PAH  who presented for surgery for progressive flatfoot deformity. Pt had open reduction of R subtalar and R talonavicular joint dislocation along with triple arthrodeses and R achilles tendon lengthening 12/6. He developed hypotension with acute renal failure and elevated LFTs. Then started to develop wound breakdown of previous L BKA, started on Ancef 12/8. Then developed afib RVR with hypotension, bedside echo concerning for cardiogenic shock so transferred to ICU.     12/10: Dobutamine off but now back on as CI < 2.2. Pt converted back to afib, rate controlled.   12/11: Lasix decreased to 40mg q8. Lots of ectopy overnight. Wound cx obtained. Stopped hydrocortisone.   12/12: Transaminases stable/downtrending. Changing Lasix from q8 to q12.     Consultants:  Critical Care  Cardiology     Interval Events:    12/13: Off dobutamine, titrating levophed. CI 1.8, continue to monitor off dobutamine. Wound cx + for serratia, Cefepime started.     NEURO:   A&O x4  CARDIOVASC:  Levophed  RESPIRATORY:  Stable on RA  GI/NUTRITION:  Cardiac diet  RENAL/FLUID/LYTES: UOP 3000 in last 24h. Renal function stable.   HEME/ONC:   N/A  INFECTIOUS D:  Switch to Cefepime  ENDOCRINE:   N/A    Vitals Range last 24h:  Temp:  [36.7 °C (98.1 °F)-37 °C (98.6 °F)] 37 °C (98.6 °F)  Pulse:  [] 88  Resp:  [9-41] 15  BP: ()/(42-59) 107/56  SpO2:  [89 %-98 %] 91 %      Intake/Output Summary (Last 24 hours) at 12/13/2023 0814  Last data filed at 12/13/2023 0551  Gross per 24 hour   Intake 665.36 ml    Output 3000 ml   Net -2334.64 ml          Review of Systems   Constitutional:  Negative for chills, fever and malaise/fatigue.   HENT:  Negative for ear pain, hearing loss and sinus pain.    Eyes:  Negative for blurred vision, double vision and pain.   Respiratory:  Negative for cough and shortness of breath.    Cardiovascular:  Negative for chest pain, palpitations and leg swelling.   Gastrointestinal:  Negative for abdominal pain, heartburn, nausea and vomiting.   Genitourinary:  Negative for dysuria, frequency and urgency.   Musculoskeletal:  Positive for joint pain. Negative for falls.   Neurological:  Positive for tingling. Negative for dizziness, weakness and headaches.       PHYSICAL EXAM:  Vitals:    12/13/23 0500 12/13/23 0515 12/13/23 0530 12/13/23 0545   BP: (!) 88/53  107/56    Pulse: 86 85 87 88   Resp: 17 15 (!) 41 15   Temp:       TempSrc:       SpO2:       Weight:       Height:        Body mass index is 30.62 kg/m².    O2 therapy: Pulse Oximetry: 91 %, O2 (LPM): 0, O2 Delivery Device: None - Room Air           Physical Exam  Constitutional:       General: He is not in acute distress.     Appearance: Normal appearance. He is not ill-appearing.      Comments: Sleeping comfortably when first seen   HENT:      Head: Normocephalic and atraumatic.      Nose: Nose normal.      Mouth/Throat:      Mouth: Mucous membranes are moist.      Pharynx: Oropharynx is clear.   Eyes:      General: No scleral icterus.     Extraocular Movements: Extraocular movements intact.      Pupils: Pupils are equal, round, and reactive to light.   Cardiovascular:      Rate and Rhythm: Normal rate. Rhythm irregular.      Pulses: Normal pulses.      Heart sounds: Murmur (3/6 systolic) heard.   Pulmonary:      Effort: Pulmonary effort is normal. No respiratory distress.      Breath sounds: Normal breath sounds. No wheezing.   Abdominal:      General: Abdomen is flat. Bowel sounds are normal. There is no distension.      Palpations:  Abdomen is soft.      Tenderness: There is no abdominal tenderness.   Musculoskeletal:         General: Swelling and tenderness present.      Comments: L BKA wound wrapped without evidence of discharge. RLE wound dry and clean   Skin:     General: Skin is warm and dry.      Coloration: Skin is not jaundiced or pale.   Neurological:      General: No focal deficit present.      Mental Status: He is alert and oriented to person, place, and time. Mental status is at baseline.             Recent Labs     12/11/23  0455 12/11/23  1205 12/12/23  0440 12/12/23  1229 12/12/23  1720 12/12/23  2330 12/13/23 0450   SODIUM 133*  --  136  --   --   --  136   POTASSIUM 3.3*   < > 5.0   < > 4.9 4.4 4.2   CHLORIDE 94*  --  97  --   --   --  99   CO2 28  --  27  --   --   --  29   BUN 32*  --  31*  --   --   --  25*   CREATININE 1.03  --  0.93  --   --   --  0.68   MAGNESIUM 2.4  --  2.2  --   --   --  2.1   PHOSPHORUS 1.9*  --  2.4*  --   --   --  2.6   CALCIUM 7.5*  --  7.6*  --   --   --  7.6*    < > = values in this interval not displayed.       Recent Labs     12/11/23 0455 12/12/23 0440 12/13/23 0450   ALTSGPT 332* 307* 186*   ASTSGOT 1323* 764* 269*   ALKPHOSPHAT 225* 230* 183*   TBILIRUBIN 1.3 1.0 1.0   GLUCOSE 111* 103* 94       Recent Labs     12/11/23 0455 12/12/23 0440 12/13/23 0450   RBC 3.21* 3.54* 3.39*   HEMOGLOBIN 8.6* 9.4* 9.2*   HEMATOCRIT 26.8* 30.1* 29.5*   PLATELETCT 123* 125* 103*       Recent Labs     12/11/23 0455 12/12/23 0440 12/13/23 0450   WBC 7.9 10.6 8.9   NEUTSPOLYS 88.90* 79.40* 75.40*   LYMPHOCYTES 5.20* 12.40* 16.70*   MONOCYTES 5.30 7.60 7.20   EOSINOPHILS 0.00 0.00 0.00   BASOPHILS 0.10 0.10 0.10   ASTSGOT 1323* 764* 269*   ALTSGPT 332* 307* 186*   ALKPHOSPHAT 225* 230* 183*   TBILIRUBIN 1.3 1.0 1.0         Meds:   K+ Scale: Goal of 4.5  1 Each      furosemide  40 mg      spironolactone  25 mg      NORepinephrine  0-1 mcg/kg/min (Ideal) 0.03 mcg/kg/min (12/12/23 1500)    MD Alert...Adult  ICU Electrolyte Replacement per Pharmacy        linezolid  600 mg      enoxaparin (LOVENOX) injection  40 mg      amiodarone  400 mg      Followed by    [START ON 12/17/2023] amiodarone  400 mg      Followed by    [START ON 12/24/2023] amiodarone  200 mg      DOBUTamine in D5W  0-20 mcg/kg/min (Ideal) Stopped (12/12/23 2000)    HYDROmorphone  1 mg      senna-docusate  2 Tablet      And    polyethylene glycol/lytes  1 Packet      And    magnesium hydroxide  30 mL      And    bisacodyl  10 mg      oxyCODONE immediate-release  15 mg      albuterol  2.5 mg      dakins 0.125% (1/4 strength)        omeprazole  20 mg      DULoxetine  60 mg          Procedures:  12/6: Open reduction of R subtalar and talonavicular joint dislocation, R foot triple arthrodesis, R achilles tendon lengthening, application of multiplanar external fixator RLE  12/9: Central and A lines placed    Imaging:  DX-CHEST-PORTABLE (1 VIEW)   Final Result         1.  Coronary edema and/or infiltrates, stable since prior study.   2.  Trace right pleural effusion, stable   3.  Cardiomegaly      US-EXTREMITY VENOUS LOWER BILAT   Final Result      EC-ECHOCARDIOGRAM COMPLETE W/ CONT   Final Result      US-RUQ   Final Result         1. Hepatic steatosis.   2. Otherwise, normal.      DX-CHEST-PORTABLE (1 VIEW)   Final Result         1. Napoleon-Aung catheter tip is located within a right lower lobe pulmonary arterial branch, repositioning may be indicated.   2. Stable pulmonary edema, cardiomegaly, and small pleural effusions.      DX-CHEST-PORTABLE (1 VIEW)   Final Result      1.  Small right pleural effusion with adjacent airspace disease.   2.  Worsening interstitial infiltrates and/or edema.      DX-CHEST-LIMITED (1 VIEW)   Final Result      1.  Enlarged cardiac silhouette with changes of vascular congestion.      DX-PORTABLE FLUORO > 1 HOUR   Final Result      Portable fluoroscopy utilized for 1 minute 48 seconds.         INTERPRETING LOCATION: [address] [place] [address]  [place], [id number]      DX-FOOT-COMPLETE 3+ RIGHT   Final Result      Digitized intraoperative radiograph is submitted for review. This examination is not for diagnostic purpose but for guidance during a surgical procedure. Please see the patient's chart for full procedural details.         INTERPRETING LOCATION: [address] [facility], [place], [address]          ASSESSEMENT and PLAN:    * Cardiogenic shock (HCC)  Assessment & Plan  Admitted to the ICU 12/09/23 with suspected BiV failure    - [place] guided therapy  - Continue to monitor while off dobutamine, CI 1.8  - Wean off levophed as tolerated  - Continue IV Lasix to 40mg bid  - Goal MAP > 65    S/P BKA (below knee amputation), left (HCC)- (present on admission)  Assessment & Plan  With infected stump  - Blood cultures NGTD so far  - Wound cx + for serratia  - Started Cefepime 7d course on 12/13  - Plan for I&D of left BKA when stabilized, possibly 12/14  - Continue wound care    Elevated LFTs  Assessment & Plan  Secondary to shock liver    - Improving  - Maintain adequate perfusion  - RUQ US reassuring  - Hepatitis panel negative  - Avoid hepatotoxins    Immunosuppression (HCC)  Assessment & Plan  Holding Humira for now given infected left lower extremity    Dislocation of right subtalar joint  Assessment & Plan  He underwent open reduction of right subtalar joint dislocation, right talonavicular joint dislocation right foot triple arthrodesis, right extensor tendon lengthening and application of multiplanar external fixator right lower extremity on 12/6 by ortho  Per orthopedic surgery, patient will be nonweightbearing to toe-touch weightbearing for transfer on the right lower extremity  - PT/OT when able  - DVTppx with Lovenox  - Pain regimen  - Wound care    Hx of gastric bypass- (present on admission)  Assessment & Plan  Encourage ongoing weight loss management    Hypokalemia- (present on admission)  Assessment & Plan  Replace as needed    Acute kidney injury (HCC)- (present  on admission)  Assessment & Plan  RESOLVED  Secondary to cardiogenic shock/ATN   UOP and renal function improved with diuretics     - Strict I/Os  - Renally dose meds  - Avoid nephrotoxic agents as able  - Continue to monitor    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)- (present on admission)  Assessment & Plan  Last echo from October 20, 2023 with EF 55%, grade 1 diastolic dysfunction with moderate AAS/AI and RVSP of 45 mmHg --> now in biventricular failure as seen on POCUS per Dr. Gonda 12/9    - Repeat echo 12/9 with EF 45% and severe AS  - [place] guided therapy  - Hold GDMT given shock state  - Cardiology following    Hyponatremia- (present on admission)  Assessment & Plan  Likely 2/2 heart failure  Monitor with above therapies    Chronic pain- (present on admission)  Assessment & Plan  Continue home meds    Atrial fibrillation with RVR (HCC)- (present on admission)  Assessment & Plan  History of A-fib, went into AVR 12/8, converted back to normal sinus rhythm 12/9, now back in afib 12/10    - Continued PO amiodarone  - Echo 12/9 showing EF 45% and severe AS  - Replace electrolytes prn    Sepsis (HCC)- (present on admission)  Assessment & Plan  This is Septic shock Not present on admission  SIRS criteria identified on my evaluation include: Tachycardia, with heart rate greater than 90 BPM, Tachypnea, with respirations greater than 20 per minute, and Leukocytosis, with WBC greater than 12,000  Clinical indicators of end organ dysfunction include Hypotension with systolic blood pressure less than 90 or MAP less than 65  Indicators of septic shock include: Sepsis present and persistent hypotension despite fluid resuscitation   Sources is: Soft tissue  S/p sepsis protocol initiated 12/8  Crystalloid Fluid Administration: Resuscitation volume of 2 L ordered. Reason that resuscitation volume of less than 30ml/kg was ordered concern for causing harm given CHF  IV antibiotics as appropriate for source of  sepsis  Reassessment: I have reassessed the patient's hemodynamic status  Monitor lactic acid, urine output, vital signs closely for adequacy of resuscitation    - Linezolid + ceftriaxone changed to Cefepime  - Previous wound culture from 10/2023 grew staph and strep  - Blood cultures from this admission NGTD   - Wound cx + for serratia  - Goal MAP > 65 using vasopressors     Aortic stenosis due to bicuspid aortic valve- (present on admission)  Assessment & Plan  Moderate on last echo in October 2023  - Repeat echo revealing severe AS  - Maintain euvolemia    Rheumatoid arthritis (HCC)- (present on admission)  Assessment & Plan  As needed analgesics  Holding immunosuppressants for now in the setting of infection      DISPO: Continued ICU stay, pt requiring pressors    CODE STATUS: Full Code    Quality Measures:  Feeding: Cardiac diet  Analgesia: PRN oxycodone and dilaudid  Sedation: N/A  Thromboprophylaxis: Lovenox 40mg qd  Head of bed: >30 degrees  Ulcer prophylaxis: N/A  Glycemic control: N/A  Bowel care: bowel regimen in place  Indwelling lines: Central line, Russell-talat, A line, PIVs, piedra  Deescalation of antibiotics: Cefepime added due to wound cx + eliza Carias M.D. PGY-2

## 2023-12-13 NOTE — PROGRESS NOTES
Pt having new chest pain. Pt unable to describe pain. EKG obtained, negative for STEMI. Dr. Carias notified.

## 2023-12-13 NOTE — PROGRESS NOTES
Critical Care Progress Note    Date of admission  12/6/2023    Chief Complaint  48 y.o. male admitted 12/6/2023 with a dislocated right subtalar joint.  He has a history of a bicuspid aortic valve with aortic stenosis, left BKA, PAF, rheumatoid arthritis, chronic pain, bipolar disorder.    Hospital Course      12/9 admitted to the ICU for cardiogenic shock and organ dysfunction s/p IVF  12/10 improved hemodynamics and organ dysfunction; less vasopressors/inotropic support    12/11 -    titrating dobutamine.  Force diuresis.  Replete potassium.  Continue ceftriaxone and linezolid.  12/12 -    titrating dobutamine.  Titrating norepinephrine.  Force diuresis.  Continue ceftriaxone and linezolid.  12/13 -    dobutamine has been successfully titrated off.  Titrating norepinephrine.  Continue diuresis.  Change ceftriaxone and linezolid to cefepime.  Wound culture with Serratia species.  12/14 -    titrating norepinephrine.  Remove Torreon-Aung catheter.  Continue cefepime.      Interval Problem Update  Reviewed last 24 hour events:      SR  NE  98.6  -1872 mL in the last 24  -5462 mL since admit      Review of Systems  Review of Systems   Unable to perform ROS: Acuity of condition        Vital Signs for last 24 hours   Temp:  [36.3 °C (97.3 °F)-37 °C (98.6 °F)] 36.3 °C (97.3 °F)  Pulse:  [85-96] 92  Resp:  [9-52] 15  BP: ()/(41-62) 91/53  SpO2:  [87 %-97 %] 92 %    Hemodynamic parameters for last 24 hours  CVP:  [6 MM HG-10 MM HG] 9 MM HG  PCWP:  [26 MM HG-32 MM HG] 27 MM HG  CO:  [4.8-6.1] 5.5  CI:  [2.2-2.62] 2.5    Respiratory Information for the last 24 hours       Physical Exam   Physical Exam  Constitutional:       Appearance: He is not diaphoretic.   HENT:      Head: Normocephalic.      Mouth/Throat:      Pharynx: Oropharynx is clear.   Eyes:      Pupils: Pupils are equal, round, and reactive to light.   Cardiovascular:      Comments: Sinus rhythm  Pulmonary:      Breath sounds: Rales (Scattered crackles)  present. No wheezing.   Abdominal:      General: There is no distension.      Tenderness: There is no abdominal tenderness.   Musculoskeletal:      Comments: Left BKA.  Left BKA wound dressed.  External fixator on the right lower extremity.   Skin:     General: Skin is warm.   Neurological:      General: No focal deficit present.      Mental Status: He is oriented to person, place, and time.      Cranial Nerves: No cranial nerve deficit.         Medications  Current Facility-Administered Medications   Medication Dose Route Frequency Provider Last Rate Last Admin    phosphorus (K-Phos-Neutral) per tablet 500 mg  500 mg Oral BID You Moore M.D.   500 mg at 12/14/23 1030    [START ON 12/15/2023] torsemide (Demadex) tablet 20 mg  20 mg Oral Q DAY Romaine Clements M.D.        [START ON 12/15/2023] dapagliflozin propanediol (Farxiga) tablet 10 mg  10 mg Oral DAILY Romaine Clements M.D.        cefepime (Maxipime) 2 g in  mL IVPB  2 g Intravenous Q8HRS You Moore M.D.   Stopped at 12/14/23 1421    lactulose 20 GM/30ML solution 30 mL  30 mL Oral TID You Moore M.D.   30 mL at 12/14/23 1158    potassium chloride SA (Kdur) tablet 20 mEq  20 mEq Oral BID You Moore M.D.   20 mEq at 12/14/23 0631    spironolactone (Aldactone) tablet 25 mg  25 mg Oral Q DAY Romaine Clements M.D.   25 mg at 12/14/23 0632    norepinephrine (Levophed) 8 mg in 250 mL NS infusion (premix)  0-1 mcg/kg/min (Ideal) Intravenous Continuous You Moore M.D. 5.6 mL/hr at 12/14/23 0703 0.04 mcg/kg/min at 12/14/23 0703    MD Alert...ICU Electrolyte Replacement per Pharmacy   Other PHARMACY TO DOSE Danny Dobbs M.D.        enoxaparin (Lovenox) inj 40 mg  40 mg Subcutaneous DAILY AT 1800 Danny Dobbs M.D.   40 mg at 12/13/23 1705    amiodarone (Cordarone) tablet 400 mg  400 mg Oral TWICE DAILY Danny Dobbs M.D.   400 mg at 12/14/23 0632    Followed by    [START ON 12/17/2023]  amiodarone (Cordarone) tablet 400 mg  400 mg Oral Q DAY Danny Dobbs M.D.        Followed by    [START ON 12/24/2023] amiodarone (Cordarone) tablet 200 mg  200 mg Oral Q DAY Danny Dobbs M.D.        HYDROmorphone (Dilaudid) injection 1 mg  1 mg Intravenous Q4HRS PRN Danny Dobbs M.D.   1 mg at 12/14/23 0433    senna-docusate (Pericolace Or Senokot S) 8.6-50 MG per tablet 2 Tablet  2 Tablet Oral BID Danny Dobbs M.D.   2 Tablet at 12/14/23 0630    And    polyethylene glycol/lytes (Miralax) Packet 1 Packet  1 Packet Oral QDAY PRN Danny Dobbs M.D.   1 Packet at 12/13/23 0518    And    magnesium hydroxide (Milk Of Magnesia) suspension 30 mL  30 mL Oral QDAY PRN Danny Dobbs M.D.   30 mL at 12/13/23 0516    And    bisacodyl (Dulcolax) suppository 10 mg  10 mg Rectal QDAY PRN Danny Dobbs M.D.        oxycodone (Oxy-IR) immediate release tablet 15 mg  15 mg Oral Q6HRS PRN Gil Dalton M.D.   15 mg at 12/14/23 1353    albuterol (Proventil) 2.5mg/0.5ml nebulizer solution 2.5 mg  2.5 mg Nebulization Q4H PRN (RT) Gil Dalton M.D.        dakins 0.125% (1/4 strength) topical soln   Topical BID Calvin Pryor M.D.   3 mL at 12/14/23 0708    omeprazole (PriLOSEC) capsule 20 mg  20 mg Oral DAILY Gil Dalton M.D.   20 mg at 12/14/23 0632    DULoxetine (Cymbalta) capsule 60 mg  60 mg Oral DAILY Torsten Grove M.D.   60 mg at 12/14/23 0632       Fluids    Intake/Output Summary (Last 24 hours) at 12/14/2023 1508  Last data filed at 12/14/2023 1400  Gross per 24 hour   Intake 1129.53 ml   Output 3615 ml   Net -2485.47 ml       Laboratory          Recent Labs     12/12/23  0440 12/12/23  1229 12/12/23  2330 12/13/23  0450 12/14/23  0330   SODIUM 136  --   --  136 136   POTASSIUM 5.0   < > 4.4 4.2 4.3   CHLORIDE 97  --   --  99 98   CO2 27  --   --  29 31   BUN 31*  --   --  25* 20   CREATININE 0.93  --   --  0.68 0.65   MAGNESIUM 2.2  --   --  2.1 2.0   PHOSPHORUS 2.4*  --   --  2.6  2.4*   CALCIUM 7.6*  --   --  7.6* 7.7*    < > = values in this interval not displayed.     Recent Labs     12/12/23 0440 12/13/23  0450 12/14/23  0330   ALTSGPT 307* 186* 131*   ASTSGOT 764* 269* 104*   ALKPHOSPHAT 230* 183* 165*   TBILIRUBIN 1.0 1.0 1.3   GLUCOSE 103* 94 99     Recent Labs     12/12/23 0440 12/13/23 0450 12/14/23  0330   WBC 10.6 8.9 9.9   NEUTSPOLYS 79.40* 75.40* 76.30*   LYMPHOCYTES 12.40* 16.70* 15.80*   MONOCYTES 7.60 7.20 7.00   EOSINOPHILS 0.00 0.00 0.50   BASOPHILS 0.10 0.10 0.10   ASTSGOT 764* 269* 104*   ALTSGPT 307* 186* 131*   ALKPHOSPHAT 230* 183* 165*   TBILIRUBIN 1.0 1.0 1.3     Recent Labs     12/12/23 0440 12/13/23 0450 12/14/23  0330   RBC 3.54* 3.39* 3.52*   HEMOGLOBIN 9.4* 9.2* 9.4*   HEMATOCRIT 30.1* 29.5* 30.8*   PLATELETCT 125* 103* 102*       Imaging  X-Ray:  I have personally reviewed the images and compared with prior images. and My impression is: Right greater than left lung opacities    Assessment/Plan  * Cardiogenic shock (HCC)  Assessment & Plan  He is off dobutamine  I am titrating norepinephrine to keep mean arterial pressure greater than 65    Sepsis (HCC)- (present on admission)  Assessment & Plan  Developed after admission  Skin and soft tissue source  Continue cefepime    Pulmonary hypertension (HCC)  Assessment & Plan  WHO group 2 (combined systolic and diastolic heart failure, severe aortic stenosis/regurgitation, moderate to severe mitral regurgitation)  RVSP 66 mmHg  Stop furosemide  Begin torsemide, 20 mg daily    Elevated LFTs  Assessment & Plan  Due to ischemic hepatopathy from heart failure  Trend liver enzymes and synthetic function  Avoid hepatotoxins  Improving    Dislocation of right subtalar joint  Assessment & Plan  S/P ORIF on 12/6    Acute kidney injury (HCC)- (present on admission)  Assessment & Plan  Monitor renal function and urine output  Avoid nephrotoxins and renal dose medications  Resolved    Acute combined systolic and diastolic  heart failure ([facility])- (present on admission)  Assessment & Plan  LVEF 45%  Grade 3 diastolic dysfunction  Stop furosemide  Begin spironolactone, 25 mg daily  Begin torsemide, 20 mg daily  Begin dapagliflozin, 10 mg daily  I am titrating norepinephrine to keep mean arterial pressure greater than 65    S/P BKA (below knee amputation), left ([facility])- (present on admission)  Assessment & Plan  Infected left BKA stump  Wound culture with Serratia species  Continue cefepime  Plan for I&D when medically improved  Wound care    Chronic pain- (present on admission)  Assessment & Plan  Continue as needed analgesics    Atrial fibrillation with RVR ([facility])- (present on admission)  Assessment & Plan  Continue amiodarone, 400 mg twice daily  Optimize potassium and magnesium  He is now in sinus rhythm    Aortic stenosis due to bicuspid aortic valve- (present on admission)  Assessment & Plan  Severe aortic stenosis due to bicuspid aortic valve  Mean valve gradient 48 mmHg  Associated moderate to severe aortic insufficiency  Stop furosemide  Begin spironolactone, 25 mg daily  Begin torsemide, 20 mg daily    Immunosuppression ([facility])  Assessment & Plan  Hold adalimumab with infected left BKA stump    Hx of gastric bypass- (present on admission)  Assessment & Plan       Rheumatoid arthritis ([facility])- (present on admission)  Assessment & Plan  Hold adalimumab due to infected left BKA stump         VTE:  Lovenox  Ulcer: Not Indicated  Lines: Central Line  Ongoing indication addressed and Rivera Catheter  Ongoing indication addressed    I have performed a physical exam and reviewed and updated ROS and Plan today (12/14/2023). In review of yesterday's note (12/13/2023), there are no changes except as documented above.     I have assessed and reassessed his respiratory status, blood pressure, hemodynamics, cardiovascular status with titration of norepinephrine.  He is at increased risk for worsening respiratory and cardiovascular system  dysfunction.    Discussed patient condition and risk of morbidity and/or mortality with RN, RT, Pharmacy, Charge nurse / hot rounds, and QA team    The patient remains critically ill.  Critical care time = 40 minutes in directly providing and coordinating critical care and extensive data review.  No time overlap and excludes procedures.    You Moore MD  Pulmonary and Critical Care Medicine

## 2023-12-13 NOTE — PROGRESS NOTES
Cardiology Progress Note    Date of Service  12/13/2023    Attending Physician  Danny Dobbs M.D.    Events/Subjective  Creatinine continues to improve with diuresis  Converted back to sinus rhythm overnight  Dobutamine weaned off, although cardiac index is now around 1.8  Remains on low-dose norepinephrine    Vitals  Temp:  [36.7 °C (98.1 °F)-37.1 °C (98.8 °F)] 37.1 °C (98.8 °F)  Pulse:  [] 82  Resp:  [9-46] 15  BP: ()/(46-59) 91/54  SpO2:  [89 %-98 %] 97 %    Physical Exam  General: Chronically ill-appearing, somewhat lethargic but in no acute distress  Eyes: Extraocular movements intact, anicteric  Neck: Full range of motion, unable to assess JVP with PA catheter in place, last CVP 9 mmHg  Pulmonary: Normal respiratory effort, remains on supplemental oxygen  Cardiovascular: Regular rate, regular rhythm  Gastrointestinal: Obese, nondistended  Extremities: Left lower extremity BKA, right lower extremity in fixation device  Neurological: Lethargic, but arousable and answers questions appropriately    Laboratories  Lab Results   Component Value Date/Time    WBC 8.9 12/13/2023 04:50 AM    WBC 8.4 10/19/2010 12:05 PM    RBC 3.39 (L) 12/13/2023 04:50 AM    RBC 4.96 10/19/2010 12:05 PM    HEMOGLOBIN 9.2 (L) 12/13/2023 04:50 AM    HEMATOCRIT 29.5 (L) 12/13/2023 04:50 AM    MCV 87.0 12/13/2023 04:50 AM    MCV 85 10/19/2010 12:05 PM    MCH 27.1 12/13/2023 04:50 AM    MCH 28.4 10/19/2010 12:05 PM    MCHC 31.2 (L) 12/13/2023 04:50 AM    MPV 11.3 12/13/2023 04:50 AM      Lab Results   Component Value Date/Time    SODIUM 136 12/13/2023 04:50 AM    POTASSIUM 4.2 12/13/2023 04:50 AM    CHLORIDE 99 12/13/2023 04:50 AM    CO2 29 12/13/2023 04:50 AM    GLUCOSE 94 12/13/2023 04:50 AM    BUN 25 (H) 12/13/2023 04:50 AM    CREATININE 0.68 12/13/2023 04:50 AM    CREATININE 0.95 10/19/2010 12:05 PM    BUNCREATRAT 9 10/19/2010 12:05 PM    GLOMRATE >59 10/19/2010 12:05 PM      Lab Results   Component Value Date/Time     "ASTSGOT 269 (H) 12/13/2023 04:50 AM    ALTSGPT 186 (H) 12/13/2023 04:50 AM     Lab Results   Component Value Date/Time    CHOLSTRLTOT 130 01/28/2021 03:30 PM    LDL 48 01/28/2021 03:30 PM    HDL 68 01/28/2021 03:30 PM    TRIGLYCERIDE 72 01/28/2021 03:30 PM    TROPONINT 27 (H) 12/08/2023 10:03 PM       No results for input(s): \"NTPROBNP\" in the last 72 hours.    Telemetry  Atrial fibrillation/flutter    Studies  TTE (12/9/2023)  The left ventricle is mildly dilated. The left ventricular ejection fraction is visually estimated to be 45%.  Mildly reduced left ventricular systolic function.  Normal right ventricular size and systolic function.  Moderately dilated left atrium  Aortic valve not well viauzlied; possibly bicuspid aortic valve.  Severe aortic valve stenosis.  Moderate to severe aortic insufficiency.  Moderate to severe mitral regurgitation.  Mild tricuspid regurgitation.   Right atrial pressure is estimated to be 15 mmHg. Estimated right ventricular systolic pressure is 66 mmHg; severely elevated.  The ascending aorta is moderately dilated with a diameter of 4.5 cm.    Assessment  # Probable mixed septic and cardiogenic shock  # Atrial fibrillation/flutter with RVR  # Severe bicuspid aortic valve stenosis/regurgitation  # Moderate to severe mitral regurgitation  # Acute heart failure with reduced ejection fraction (LVEF 45%)  # Cardiogenic shock developed in the setting of atrial fibrillation with RVR and severe bicuspid aortic valve stenosis.  # Cardiorenal syndrome with acute kidney injury  # Acute liver injury due to shock  # Pulmonary hypertension, likely mixed WHO 2/3 disease  # Hyperkalemia in the setting of KATERYNA, resolved    Recommendation  -Continue to wean norepinephrine as tolerated  -Can remain off dobutamine for now despite slightly low cardiac index, however, if urine output worsens or there is other evidence of poor endorgan perfusion this may need to be restarted  -May need liberal SBP goal to " wean norepinephrine  -If unable to wean vasoactive agents, then may need to consider temporizing BAV  -Defer antibiotics to primary service  -Continue oral amiodarone per schedule  -Eventually would plan to start NOAC when there is no further potential for invasive procedures  -Continue intravenous diuresis with furosemide 40 mg bid  -Continue spironolactone 25 mg daily  -Daily BMP and aggressive potassium repletion hold diuresis      Cardiology will continue to follow.  Please contact me with any questions.    Romaine Clements M.D.   Cardiologist, [facility]

## 2023-12-13 NOTE — CARE PLAN
The patient is Watcher - Medium risk of patient condition declining or worsening    Shift Goals  Clinical Goals: CI > 2.2, hemodynamic stability, pain management, wean vasopressors  Patient Goals: pain management  Family Goals: elias    Progress made toward(s) clinical / shift goals:    Problem: Hemodynamics  Goal: Patient's hemodynamics, fluid balance and neurologic status will be stable or improve  Outcome: Not Progressing  Note: 1. Monitor vital signs, pulse oximetry and cardiac monitor per provider order and/or policy   2. Maintain blood pressure per provider order   3. Hemodynamic monitoring per provider order   4. Manage IV fluids and IV infusions   5. Monitor intake and output   6. Daily weights per unit policy or provider order   7. Assess peripheral pulses and capillary refill   8. Assess color and body temperature   9. Position patient for maximum circulation/cardiac output   10. Monitor for signs/symptoms of excessive bleeding   11. Assess mental status, restlessness and changes in level of consciousness      Problem: Knowledge Deficit - Standard  Goal: Patient and family/care givers will demonstrate understanding of plan of care, disease process/condition, diagnostic tests and medications  Outcome: Progressing  Note:   1. Patient and family/caregiver oriented to unit, equipment, visitation policy and means for communicating concern   2. Complete/review Learning Assessment   3. Assess knowledge level of disease process/condition, treatment plan, diagnostic tests and medications   4. Explain disease process/condition, treatment plan, diagnostic tests and medications       Problem: Pain - Standard  Goal: Alleviation of pain or a reduction in pain to the patient’s comfort goal  Outcome: Progressing  Note:   1. Document pain using the appropriate pain scale per order or unit policy   2. Educate and implement non-pharmacologic comfort measures (i.e. relaxation, distraction, massage, cold/heat therapy, etc.)   3.  Pain management medications as ordered   4. Reassess pain after pain med administration per policy   5. If opiods administered assess patient's response to pain medication is appropriate per POSS sedation scale   6. Follow pain management plan developed in collaboration with patient and interdisciplinary team            Patient is not progressing towards the following goals:      Problem: Hemodynamics  Goal: Patient's hemodynamics, fluid balance and neurologic status will be stable or improve  Outcome: Not Progressing  Note: 1. Monitor vital signs, pulse oximetry and cardiac monitor per provider order and/or policy   2. Maintain blood pressure per provider order   3. Hemodynamic monitoring per provider order   4. Manage IV fluids and IV infusions   5. Monitor intake and output   6. Daily weights per unit policy or provider order   7. Assess peripheral pulses and capillary refill   8. Assess color and body temperature   9. Position patient for maximum circulation/cardiac output   10. Monitor for signs/symptoms of excessive bleeding   11. Assess mental status, restlessness and changes in level of consciousness

## 2023-12-13 NOTE — THERAPY
"Physical Therapy   Daily Treatment     Patient Name: Richard Hubbard II  Age:  48 y.o., Sex:  male  Medical Record #: [id number]  Today's Date: 12/13/2023     Precautions  Precautions: Fall Risk;Non Weight Bearing Right Lower Extremity;Non Weight Bearing Left Lower Extremity  Comments: RLE \"okay for touchdown as needed for transfers on the frame\" per Ortho; wound to LLE terminal residual limb    Assessment    Today, pt is able to perform seated scoot along EOB for one \"scoot\" to his L, but unable to scoot to right. Weight of ex fix is limiting as pt is weak. Pt shows good effort, but with delayed responses, delayed processing. Pt is appreciative of therapy. Plan made to pursue practice of slide board transfer to  as pt does not feel confident with idea of trying stand/squat pivot to wc. PT to continue. See details below.     Plan    Treatment Plan Status: Continue Current Treatment Plan  Type of Treatment: Bed Mobility, Equipment, Neuro Re-Education / Balance, Self Care / Home Evaluation, Therapeutic Activities, Therapeutic Exercise  Treatment Frequency: 4 Times per Week  Treatment Duration: Until Therapy Goals Met    DC Equipment Recommendations: Unable to determine at this time  Discharge Recommendations: Recommend post-acute placement for additional physical therapy services prior to discharge home         Objective       12/13/23 0844   Charge Group   Charges  Yes   PT Therapeutic Activities (Units) 3   Total Time Spent   PT Total Time Yes   PT Therapeutic Activities Time Spent (Mins) 38   PT Total Time Spent (Calculated) 38   Precautions   Precautions Fall Risk;Non Weight Bearing Right Lower Extremity;Non Weight Bearing Left Lower Extremity   Comments RLE \"okay for touchdown as needed for transfers on the frame\" per Ortho; wound to LLE terminal residual limb   Pain 0 - 10 Group   Therapist Pain Assessment During Activity;Nurse Notified;6  (R LE. Pt was premedicated with IV pain meds.)   Cognition  "   Cognition / Consciousness X   Speech/ Communication Delayed Responses   Level of Consciousness Alert   Comments very delayed, has trouble rating pain, but eventually states 6/10   Active ROM Lower Body    Active ROM Lower Body  X   Comments wiggles R toes, remainder of LE AROM limited by weight of ex fix. L LE BKA, residual limb wfl.   Strength Lower Body   Lower Body Strength  X   Comments R LE unable to fully test, needs assist to move across mattress due to weight of ex fix. L LE able to move across mattress without assist. Wound remains distal aspect L LE. Nsg in to change dressing after it fell off during therapy   Balance   Sitting Balance (Static) Fair   Sitting Balance (Dynamic) Fair -   Weight Shift Sitting Poor   Skilled Intervention Verbal Cuing;Sequencing   Bed Mobility    Supine to Sit Maximal Assist   Sit to Supine Maximal Assist   Scooting Moderate Assist  (pt shows good effort at self scoot to EOB and along EOB, but limited by weakness)   Rolling Moderate Assist to Lt.   Skilled Intervention Verbal Cuing;Sequencing;Facilitation   Gait Analysis   Gait Level Of Assist Unable to Participate   Functional Mobility   Sit to Stand Unable to Participate   Bed, Chair, Wheelchair Transfer Unable to Participate   Comments trial today of seated scoot along EOB in preparation for slide board. Pt able to scoot once to his left, unable to scoot to his right. Discussion/education re two options: stand/squat pivot to chair vs slide board to chair. Pt reports unable to attempt partial stand or squat to chair, feels more confident with sliding along EOB and later to try slide board.   How much difficulty does the patient currently have...   Turning over in bed (including adjusting bedclothes, sheets and blankets)? 3   Sitting down on and standing up from a chair with arms (e.g., wheelchair, bedside commode, etc.) 1   Moving from lying on back to sitting on the side of the bed? 2   How much help from another person  does the patient currently need...   Moving to and from a bed to a chair (including a wheelchair)? 1   Need to walk in a hospital room? 1   Climbing 3-5 steps with a railing? 1   6 clicks Mobility Score 9   Activity Tolerance   Sitting Edge of Bed 10+   Short Term Goals    Short Term Goal # 1 Pt will perform bed mobility with supervision to progress function in 6 visits.   Goal Outcome # 1 goal not met   Short Term Goal # 2 Pt will transfer bed to wheelchair with supervision to progress function in 6 visits.   Goal Outcome # 2 Goal not met   Short Term Goal # 3 Pt will propel wheelchair 100ft at mod I level to progress function in 6 visits.   Goal Outcome # 3 Goal not met   Education Group   Transfer Status Patient Response Patient;Acceptance;Explanation;Verbal Demonstration;Action Demonstration   Additional Comments as above, ed done re options for bed to wc transfer, plan made to try via slide board for now.   Physical Therapy Treatment Plan   Physical Therapy Treatment Plan Continue Current Treatment Plan   Anticipated Discharge Equipment and Recommendations   DC Equipment Recommendations Unable to determine at this time   Discharge Recommendations Recommend post-acute placement for additional physical therapy services prior to discharge home   Interdisciplinary Plan of Care Collaboration   IDT Collaboration with  Nursing   Patient Position at End of Therapy In Bed;Call Light within Reach;Tray Table within Reach;Phone within Reach   Collaboration Comments nsg updated   Session Information   Date / Session Number  12/13-2 (2/4, 12/13)

## 2023-12-14 NOTE — CARE PLAN
The patient is Watcher - Medium risk of patient condition declining or worsening    Shift Goals  Clinical Goals: wean levo and sit in chair  Patient Goals: pain control  Family Goals: n/a    Progress made toward(s) clinical / shift goals:        Problem: Fall Risk  Goal: Patient will remain free from falls  Outcome: Progressing     Problem: Knowledge Deficit - Standard  Goal: Patient and family/care givers will demonstrate understanding of plan of care, disease process/condition, diagnostic tests and medications  Outcome: Progressing     Problem: Pain - Standard  Goal: Alleviation of pain or a reduction in pain to the patient’s comfort goal  Outcome: Progressing     Problem: Skin Integrity  Goal: Skin integrity is maintained or improved  Outcome: Progressing     Problem: Hemodynamics  Goal: Patient's hemodynamics, fluid balance and neurologic status will be stable or improve  Outcome: Progressing     Problem: Fluid Volume  Goal: Fluid volume balance will be maintained  Outcome: Progressing

## 2023-12-14 NOTE — DIETARY
"Nutrition services: Day 8 of admit.  Richard Hubbard II is a 48 y.o. male with admitting DX of Right foot pain   Acute right ankle pain   Dislocation of right subtalar joint, initial encounter  Sepsis     Potentially Poor PO observed during weekly screen     Assessment:  Height: 180.3 cm (5' 11\")  Weight: 98.4 kg (216 lb 14.9 oz) via bed scale   BMI adjusted for BKA: 31.8. BMI classification: Obesity Class I   Diet/Intake: cardiac     Evaluation:   Pertinent medical hx and presentation includes: acute kidney injury, s/p left BKA, RA, hx of gastric bypass, acute combined systolic and diastolic heart failure, immunosuppression  No poor PO indicated per malnutrition screening tool completed upon admission. Per ADLs, pt consuming variably, with majority of meals <25-50% and others >50%. RD to add Ensure Plus or equivalent to assist in ease of adequate nutrient intake with consistency.   Per chart review, weight hx with unknown measurement sources  Wt Readings from Last 4 Encounters:   12/14/23 98.4 kg (216 lb 14.9 oz)   11/28/23 93 kg (205 lb)   10/29/23 93 kg (205 lb 0.4 oz)   10/19/23 104 kg (229 lb 15 oz)   10/10/23: 200 lbs per office visit   Weight varies, likely based on method of measurement or self-reported. Pt with stand-up scale weight during admission at 203 lbs, which appears to be stable within the past 1 month minimally. No weight loss reported per Malnutrition Screening Tool conducted upon admission. No weight loss observed during course of admission.   Skin: WT follows for partial thickness to sacrum r/t friction and full thickness wound to left leg; no peripheral edema documented   Pertinent labs: albumin 2.6, phosphorus 2.4  Pertinent meds: cordarone, lovenox, lasix, lactulose, prilosec, potassium chloride, phosphorus, senokot, levophed @0.04mcg  Last BM: none charted since 12/4, noted with constipation, lactulose started 12/13    Malnutrition Risk: At risk with potentially poor PO    Problem: " Nutritional:  Goal: Achieve adequate nutritional intake    Recommendations/Plan:  Patient will consume 50-75% or more for meals/supplement provided   RD to add Ensure Plus or equivalent BID to supplement intake   Encourage intake of meals  Document intake of all meals and/or supplements as % taken in ADL's to provide interdisciplinary communication across all shifts.   Monitor weight.  Nutrition rep will continue to see patient for ongoing meal and snack preferences.     RD following

## 2023-12-14 NOTE — PROGRESS NOTES
1600      CVP:  [6 MM HG-11 MM HG] 10 MM HG  PCWP:  [21 MM HG-33 MM HG] 32 MM HG  CO:  [3.9-7.5] 4.8  CI:  [1.79-3.44] 2.2

## 2023-12-14 NOTE — WOUND TEAM
[facility]  Inpatient Services  Wound and Skin Care Follow-up    Admission Date: 12/6/2023     Last order of IP CONSULT TO WOUND CARE was found on 10/27/2023 from Hospital Encounter on 10/26/2023     HPI, PMH, SH: Reviewed    Past Surgical History:   Procedure Laterality Date    ORIF, ANKLE Right 12/6/2023    Procedure: RIGHT SUBTALAR DISLOCATION OPEN REDUCTION INTERNAL FIXATION, TALONAVICULAR OPEN REDUCTION INTERNAL FIXATION, ACHILLES TENDON LENGTHENING, TRIPLE ARTHRODESIS, RIGHT LOWER EXTREMITY APPLICATION OF MULTIPLANAR EXTERNAL FIXATOR;  Surgeon: Calvin Pryor M.D.;  Location: [facility];  Service: Orthopedics    INCISION AND DRAINAGE ORTHOPEDIC Left 10/22/2023    Procedure: REVISION, BELOW KNEE AMPUTATION;  Surgeon: River Colindres M.D.;  Location: [facility];  Service: Orthopedics    IRRIGATION & DEBRIDEMENT GENERAL Left 10/19/2023    Procedure: IRRIGATION AND DEBRIDEMENT, WOUND;  Surgeon: Jay Roe M.D.;  Location: [facility];  Service: Trauma    KNEE AMPUTATION BELOW Left 03/09/2023    Procedure: AMPUTATION, BELOW KNEE;  Surgeon: Wayne Chambers M.D.;  Location: [facility];  Service: Orthopedics    PB TOTAL KNEE ARTHROPLASTY Right 07/22/2020    Procedure: ARTHROPLASTY, KNEE, TOTAL;  Surgeon: Temo Pires M.D.;  Location: [facility];  Service: Orthopedics    TENDON TRANSFER Right 01/22/2020    Procedure: RIGHT DISTAL ULNA RESECTION AND EXTENSOR TENDON TRANSFER;  Surgeon: Marine Rushing M.D.;  Location: [facility];  Service: Orthopedics    OTHER ORTHOPEDIC SURGERY Right 2020    total right knee replacement    IRRIGATION & DEBRIDEMENT ORTHO Left 10/09/2019    Procedure: IRRIGATION AND DEBRIDEMENT, WOUND - PELVIC OSTEOMYELITIS;  Surgeon: You Olivares M.D.;  Location: [facility];  Service: Orthopedics    OTHER SURGICAL PROCEDURE  2019    osteomelitis of pelvis cleaned    GASTRIC BYPASS  LAPAROSCOPIC      Lucila en y    CHOLECYSTECTOMY      OTHER SURGICAL PROCEDURE      repair of broken penis.  23:  Patient denies any surgery on penis.     Social History     Tobacco Use    Smoking status: Some Days     Current packs/day: 0.00     Types: Cigarettes, Cigars     Last attempt to quit: 2023     Years since quittin.8     Passive exposure: Current    Smokeless tobacco: Never    Tobacco comments:     occasional cigar.  23:  Patient declines smoking cessation information at this time.    Substance Use Topics    Alcohol use: No     Alcohol/week: 0.0 oz     Comment: quit  ago- past ETOH abuse     No chief complaint on file.    Diagnosis: Right foot pain [M79.671]  Acute right ankle pain [M25.571]  Dislocation of right subtalar joint, initial encounter [S93.314A]  Sepsis (HCC) [A41.9]    Unit where seen by Wound Team: T615/     WOUND FOLLOW UP RELATED TO:  L BKA, sacrum       WOUND TEAM PLAN OF CARE - Frequency of Follow-up:   Nursing to follow dressing orders written for wound care. Contact wound team if area fails to progress, deteriorates or with any questions/concerns if something comes up before next scheduled follow up (See below as to whether wound is following and frequency of wound follow up)  Dressing changes by wound team:                   Weekly - L BKA  Not following, consult as needed  - Sacrum    WOUND HISTORY:       Patient with diagnosis of pes planovalgus admitted  and had surgery with Dr. Pryor related to right foot:  Right subtalar joint dislocation  Right talonavicular dislocation   Right ankle equinus contracture  Severe right pes planovalgus  Rheumatoid arthritis  Patient underwent L BKA with Dr. Chambers in March related to foot wound.  In October he had 2 I&Ds with ortho related to abscess.  Patient had a wound vac.  Unclear why he does not have a VAC dressing now.         WOUND ASSESSMENT/LDA  Wound 23 Full Thickness Wound Leg Left chronic  wound (Active)   Date First Assessed/Time First Assessed: 12/06/23 2015   Present on Original Admission: Yes  Primary Wound Type: Full Thickness Wound  Location: Leg  Laterality: Left  Wound Description (Comments): chronic wound      Assessments 12/13/2023  4:00 PM   Wound Image      Site Assessment Red;Pink;Yellow;Painful   Periwound Assessment Clean;Dry;Intact   Margins Defined edges;Unattached edges   Closure Secondary intention   Drainage Amount Scant   Drainage Description Serosanguineous   Treatments Cleansed;Offloading   Wound Cleansing Approved Wound Cleanser   Periwound Protectant Barrier Paste   Dressing Status Clean;Dry;Intact   Dressing Changed Changed   Dressing Cleansing/Solutions 1/4 Strength Dakin's Solution   Dressing Options Plain Strip Packing;Silicone Adhesive Foam   Dressing Change/Treatment Frequency Every Shift, and As Needed   NEXT Dressing Change/Treatment Date 12/14/23   NEXT Weekly Photo (Inpatient Only) 12/20/23   Wound Team Following Weekly   Non-staged Wound Description Full thickness   Wound Length (cm) 1.1 cm   Wound Width (cm) 1.2 cm   Wound Depth (cm) 0.8 cm   Wound Surface Area (cm^2) 1.32 cm^2   Wound Volume (cm^3) 1.056 cm^3   Undermining (cm) 1.9 cm   Undermining of Wound, 1st Location From 12 o'clock;To 12 o'clock   Shape Round   Wound Odor None   WOUND NURSE ONLY - Time Spent with Patient (mins) 30       Wound 12/13/23 Partial Thickness Wound Sacrum r/t friction (Active)   Date First Assessed/Time First Assessed: 12/13/23 1100   Primary Wound Type: Partial Thickness Wound  Location: Sacrum  Wound Description (Comments): r/t friction      Assessments 12/13/2023  4:00 PM   Wound Image      Site Assessment Red   Periwound Assessment Clean;Dry;Intact   Margins Defined edges;Attached edges   Closure Adhesive bandage   Drainage Amount None   Treatments Cleansed;Offloading   Wound Cleansing Foam Cleanser/Washcloth   Dressing Status Clean;Dry;Intact   Dressing Changed Changed    Dressing Options Offloading Dressing - Sacral   Dressing Change/Treatment Frequency Every 72 hrs, and As Needed   NEXT Dressing Change/Treatment Date 12/16/23   NEXT Weekly Photo (Inpatient Only) 12/20/23   Wound Team Following Not following   Non-staged Wound Description Partial thickness        Vascular:    SUSHMA:   No results found.    Lab Values:    Lab Results   Component Value Date/Time    WBC 8.9 12/13/2023 04:50 AM    WBC 8.4 10/19/2010 12:05 PM    RBC 3.39 (L) 12/13/2023 04:50 AM    RBC 4.96 10/19/2010 12:05 PM    HEMOGLOBIN 9.2 (L) 12/13/2023 04:50 AM    HEMATOCRIT 29.5 (L) 12/13/2023 04:50 AM    CREACTPROT 0.43 11/27/2023 02:30 PM    SEDRATEWES 40 (H) 11/27/2023 02:30 PM    HBA1C 4.7 10/28/2023 05:38 AM         Culture Results show:  Recent Results (from the past 720 hour(s))   CULTURE WOUND W/ GRAM STAIN    Collection Time: 12/11/23  2:04 PM    Specimen: Left Leg; Wound   Result Value Ref Range    Significant Indicator POS (POS)     Source WND     Site LEFT LEG     Culture Result - (A)     Gram Stain Result Moderate WBCs.  No organisms seen.       Culture Result Serratia marcescens  Light growth   (A)        Susceptibility    Serratia marcescens - MARÍA     Ampicillin >16 Resistant mcg/mL     Amoxicillin/CA >16/8 Resistant mcg/mL     Ceftriaxone <=1 Sensitive mcg/mL     Cefazolin >16 Resistant mcg/mL     Ciprofloxacin <=0.25 Sensitive mcg/mL     Cefepime <=2 Sensitive mcg/mL     Cefuroxime >16 Resistant mcg/mL     Ampicillin/sulbactam >16/8 Resistant mcg/mL     Tobramycin <=2 Sensitive mcg/mL     Ertapenem <=0.5 Sensitive mcg/mL     Gentamicin <=2 Sensitive mcg/mL     Levofloxacin <=0.5 Sensitive mcg/mL     Minocycline <=4 Sensitive mcg/mL     Moxifloxacin <=2 Sensitive mcg/mL     Pip/Tazobactam <=8 Sensitive mcg/mL     Trimeth/Sulfa <=0.5/9.5 Sensitive mcg/mL     Tigecycline <=2 Sensitive mcg/mL       Pain Level/Medicated:  IV pain medications administered by bedside RN 5min prior (Pt educated that IV  "medication will not be available on outpatient basis)       INTERVENTIONS BY WOUND TEAM:  Chart and images reviewed. Discussed with bedside RN. All areas of concern (based on picture review, LDA review and discussion with bedside RN) have been thoroughly assessed. Documentation of areas based on significant findings. This RN in to assess patient. Performed standard wound care which includes appropriate positioning, dressing removal and non-selective debridement. Pictures and measurements obtained weekly if/when required.    Wound:  L BKA  Preparation for Dressing removal: Removed without difficulty  Cleansed/Non-selectively Debrided with:  Wound cleanser and Gauze  [place] wound: Cleansed with Wound cleanser and Gauze, Prepped with Barrier paste  Primary Dressing:  Dakins moistened 1/2\" strip packing  Secondary (Outer) Dressing: Island dressing cut to fit (no silicone adhesive foams available on floor)     Wound:  Sacrum  Preparation for Dressing removal: Removed without difficulty  Cleansed/Non-selectively Debrided with:  No rinse foam soap and Moist warm washcloth  Primary Dressing:  Offloading adhesive foam    Advanced Wound Care Discharge Planning  Number of Clinicians necessary to complete wound care: 1  Is patient requiring IV pain medications for dressing changes:  No   Length of time for dressing change 10 min. (This does not include chart review, pre-medication time, set up, clean up or time spent charting.)    Interdisciplinary consultation: Patient, Bedside RN (Ghazal)  EVALUATION / RATIONALE FOR TREATMENT:     Date:  12/13/23  Wound Status:  Wound progressing as expected  Less slough overlying wound bed. Patient still not medically stable for L BKA revision at this time. Continuing Dakins wet to dry dressing in the mean time.  Sacrum with small partial thickness skin loss, likely related to friction with some moisture components as well.    Date:  12/07/23  Wound Status:  Initial evaluation    Wound with " bone/periosteum exposed.  Updated Dr. Pryor who preformed R foot surgery.  Dr. Pryor agreed to have member of surgery team take a look at wound and determine if/when a revision would be appropriate.  For now, dakin's moistened packing to wound bed to encourage wicking of fluid and aid in removal of dead tissue.           Goals: Steady decrease in wound area and depth weekly.    NURSING PLAN OF CARE ORDERS:  No new orders this visit    NUTRITION RECOMMENDATIONS   Wound Team Recommendations:  N/A     DIET ORDERS (From admission to next 24h)       Start     Ordered    12/10/23 1054  Diet Order Diet: Cardiac  ALL MEALS        Question:  Diet:  Answer:  Cardiac    12/10/23 1053                    PREVENTATIVE INTERVENTIONS:   Q shift Nam - performed per nursing policy  Q shift pressure point assessments - performed per nursing policy    Surface/Positioning  ICU Low Airloss - Currently in Place  Reposition q 2 hours - Currently in Place    Offloading/Redistribution  Sacral offloading dressing (Silicone dressing) - Currently in Place  Float Heels off Bed with Pillows - Currently in Place           Containment/Moisture Prevention    Rivera Catheter - Currently in Place    Mobilization      Mobilizing with PT/OT      Anticipated discharge plans:  TBD        Vac Discharge Needs:  Vac Discharge plan is purely a recommendation from wound team and not a requirement for discharge unless otherwise stated by physician.  Not Applicable Pt not on a wound vac

## 2023-12-14 NOTE — PROGRESS NOTES
"The patient was seen and evaluated on rounds yesterday afternoon    He stated that he was overall doing \"okay\".  He states that his pain in the right lower extremity is relatively well-controlled.    Exam:  Right lower extremity examination with external fixator in place, pins are well-fixed  Incisions are healing well, no evidence of erythema or dehiscence    48-year-old male postoperative day 8 status post right lower extremity external fixator application after triple arthrodesis and Achilles tendon lengthening    Plan:  Nonweightbearing right lower extremity  Okay for touchdown weightbearing for transfers  PT/OT  Will require formal surgical intervention on the left lower extremity once stabilized from a cardiac standpoint  Orthopedics will continue to follow  We will help continue to coordinate care regarding the left lower extremity once he is safe and able to undergo any surgical procedure from a cardiac standpoint.      Calvin Pryor MD  "

## 2023-12-14 NOTE — CARE PLAN
The patient is {Patient Stability:[id number]}    Shift Goals  Clinical Goals: CI >2, mobilize  Patient Goals: pain control  Family Goals: JI    Progress made toward(s) clinical / shift goals:  ***    Patient is not progressing towards the following goals:      Problem: Pain - Standard  Goal: Alleviation of pain or a reduction in pain to the patient’s comfort goal  Outcome: Not Progressing  Note: Pain untouched by oxy 15mg PRN.  Pain slightly decreased with dilaudid 1mg PRN.  Pt states pain in R foot is worsening

## 2023-12-14 NOTE — CARE PLAN
The patient is Watcher - Medium risk of patient condition declining or worsening    Shift Goals  Clinical Goals: CI >2, mobilize  Patient Goals: pain control  Family Goals: JI    Progress made toward(s) clinical / shift goals:       Problem: Hemodynamics  Goal: Patient's hemodynamics, fluid balance and neurologic status will be stable or improve  Outcome: Progressing  Note: Pt CO/ CI improving off of dobutamine gtt.  Levo required again at low dose overnight        Patient is not progressing towards the following goals:      Problem: Pain - Standard  Goal: Alleviation of pain or a reduction in pain to the patient’s comfort goal  12/14/2023 0511 by Jasmyne Nichols R.N.  Outcome: Not Progressing  Note: Pain not affected much by oxy 15mg and dilaudid 1mg PRNs.  Pt states pain is worsening throughout the night.

## 2023-12-14 NOTE — THERAPY
"Occupational Therapy  Daily Treatment     Patient Name: Richard Hubbard II  Age:  48 y.o., Sex:  male  Medical Record #: [id number]  Today's Date: 12/14/2023     Precautions: Fall Risk, Non Weight Bearing Right Lower Extremity, Non Weight Bearing Left Lower Extremity  Comments: MAP >65; RLE \"okay for touchdown as needed for transfers on the frame\" per Ortho; wound to LLE terminal residual limb    `    Assessment    Pt seen for OT tx to include: bed mobility, sitting balance, lateral seated scooting, gown change, seated grooming. See grid below for details including assessment of pt's manual WC and feasibility of seated slide board transfers. Pt demos improved lateral scoots, but anticipate need for 2 skilled therapists to ensure safety (given L BKA and R ex-fix). Initial training on use of resistance band for UB strengthening. Pt needs mod/max cues for form and demonstrated limited tolerance for therapeutic exercise. Continues to demo moderate cognitive deficits (delayed processing, decreased attention). Will benefit from ongoing acute OT.     Plan    Treatment Plan Status: Continue Current Treatment Plan  Type of Treatment: Self Care / Activities of Daily Living, Adaptive Equipment, Cognitive Skill Development, Neuro Re-Education / Balance, Therapeutic Exercises, Therapeutic Activity  Treatment Frequency: 3 Times per Week  Treatment Duration: Until Therapy Goals Met    DC Equipment Recommendations: Unable to determine at this time  Discharge Recommendations: Recommend post-acute placement for additional occupational therapy services prior to discharge home    Subjective    \"My foot hurts.\"     Objective       12/14/23 1124   Vitals   Blood Pressure (!) 86/53   O2 (LPM) 1   O2 Delivery Device Silicone Nasal Cannula   Vitals Comments MAP maintained; SBP slightly below goal, but pt asymmtomatic throughout session   Cognition    Speech/ Communication Delayed Responses   Level of Consciousness Alert   New Learning " "Impaired   Attention Impaired   Comments pt continues to demo delayed processing, but more engaged than last session   Strength Upper Body   Gross Strength Generalized Weakness, Equal Bilaterally.    Supine Upper Body Exercises   Chest Fly Light Resistance Theraband;Bilateral  (1 set of 5)   Bicep Curl Light Resistance Theraband;Bilateral  (1 set of 5)   Elbow Extension Light Resistance Theraband;Bilateral  (1 set of 5)   Comments limited tolerance for therapeutic exercise   Other Treatments   Other Treatments Provided assessed seat height of pt's WC and discussed goal of seated slide board transfers; pt's WC has 2 stacked closed foam cushions, but is still ~3\" lower than EOB; anticipate pt would currently require heavy assist for uphill transfer BTB; will continue to work on lateral scoots with goal of minimal hip lift-off for increased independence with transfers   Balance   Sitting Balance (Static) Fair   Sitting Balance (Dynamic) Fair -   Weight Shift Sitting Poor   Bed Mobility    Supine to Sit Maximal Assist   Sit to Supine Maximal Assist   Scooting Moderate Assist  (seated, lateral L & R)   Activities of Daily Living   Grooming Standby Assist;Seated  (oral care EOB)   Upper Body Dressing Moderate Assist  (gown change; lines impact)   Toileting   (Rivera, no BM)   Functional Mobility   Sit to Stand Unable to Participate   Bed, Chair, Wheelchair Transfer Unable to Participate   Mobility Supine > < EOB, lateral scooting   Activity Tolerance   Sitting Edge of Bed >15 min   Comments improved sitting tolerance, but continues to be limited by weakness   Short Term Goals   Short Term Goal # 1 Pt will complete slide board transfer to/from  with mod A   Goal Outcome # 1 Goal not met   Short Term Goal # 2 Pt will change gown EOB with set-up   Goal Outcome # 2 Progressing slower than expected   Short Term Goal # 3 Pt will use urinal EOB with set-up   Goal Outcome # 3   (NT; Rivera still in place)           "

## 2023-12-14 NOTE — PROGRESS NOTES
Monitor Summary    Rhythm:NSR in 80-90s with occasional PVCs  SC:0.17  QRS:0.08  QT:0.39

## 2023-12-14 NOTE — PROGRESS NOTES
Cardiology Progress Note    Date of Service  12/14/2023    Events/Subjective  Creatinine stable  Remains in sinus rhythm  Remains on very low-dose norepinephrine with cardiac index greater than 2    Vitals  Temp:  [36.8 °C (98.2 °F)-37.1 °C (98.8 °F)] 36.9 °C (98.4 °F)  Pulse:  [81-95] 92  Resp:  [9-46] 16  BP: ()/(41-62) 92/57  SpO2:  [87 %-97 %] 96 %    Physical Exam  General: Chronically ill-appearing, in no acute distress  Eyes: Extraocular movements intact, anicteric  Neck: Full range of motion, unable to assess JVP with PA catheter in place, last CVP 7 mmHg  Pulmonary: Normal respiratory effort, remains on supplemental oxygen  Cardiovascular: Regular rate, regular rhythm  Gastrointestinal: Obese, nondistended  Extremities: Left lower extremity BKA, right lower extremity in fixation device  Neurological: Less lethargic today, answering questions appropriately    Laboratories  Lab Results   Component Value Date/Time    WBC 9.9 12/14/2023 03:30 AM    WBC 8.4 10/19/2010 12:05 PM    RBC 3.52 (L) 12/14/2023 03:30 AM    RBC 4.96 10/19/2010 12:05 PM    HEMOGLOBIN 9.4 (L) 12/14/2023 03:30 AM    HEMATOCRIT 30.8 (L) 12/14/2023 03:30 AM    MCV 87.5 12/14/2023 03:30 AM    MCV 85 10/19/2010 12:05 PM    MCH 26.7 (L) 12/14/2023 03:30 AM    MCH 28.4 10/19/2010 12:05 PM    MCHC 30.5 (L) 12/14/2023 03:30 AM    MPV 10.4 12/14/2023 03:30 AM      Lab Results   Component Value Date/Time    SODIUM 136 12/14/2023 03:30 AM    POTASSIUM 4.3 12/14/2023 03:30 AM    CHLORIDE 98 12/14/2023 03:30 AM    CO2 31 12/14/2023 03:30 AM    GLUCOSE 99 12/14/2023 03:30 AM    BUN 20 12/14/2023 03:30 AM    CREATININE 0.65 12/14/2023 03:30 AM    CREATININE 0.95 10/19/2010 12:05 PM    BUNCREATRAT 9 10/19/2010 12:05 PM    GLOMRATE >59 10/19/2010 12:05 PM      Lab Results   Component Value Date/Time    ASTSGOT 104 (H) 12/14/2023 03:30 AM    ALTSGPT 131 (H) 12/14/2023 03:30 AM     Lab Results   Component Value Date/Time    CHOLSTRLTOT 130 01/28/2021  "03:30 PM    LDL 48 01/28/2021 03:30 PM    HDL 68 01/28/2021 03:30 PM    TRIGLYCERIDE 72 01/28/2021 03:30 PM    TROPONINT 27 (H) 12/08/2023 10:03 PM       No results for input(s): \"NTPROBNP\" in the last 72 hours.    Telemetry  Sinus rhythm    Studies  TTE (12/9/2023)  The left ventricle is mildly dilated. The left ventricular ejection fraction is visually estimated to be 45%.  Mildly reduced left ventricular systolic function.  Normal right ventricular size and systolic function.  Moderately dilated left atrium  Aortic valve not well viauzlied; possibly bicuspid aortic valve.  Severe aortic valve stenosis.  Moderate to severe aortic insufficiency.  Moderate to severe mitral regurgitation.  Mild tricuspid regurgitation.   Right atrial pressure is estimated to be 15 mmHg. Estimated right ventricular systolic pressure is 66 mmHg; severely elevated.  The ascending aorta is moderately dilated with a diameter of 4.5 cm.    Assessment  # Probable mixed septic and cardiogenic shock, resolving  # Atrial fibrillation/flutter with RVR, converted to sinus rhythm  # Severe bicuspid aortic valve stenosis/regurgitation  # Moderate to severe mitral regurgitation  # Acute heart failure with reduced ejection fraction (LVEF 45%)  # Cardiogenic shock developed in the setting of atrial fibrillation with RVR and severe bicuspid aortic valve stenosis.  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  -Can remove PA catheter when norepinephrine weaned off  -Discontinue intravenous diuresis and transition to torsemide 20 mg and dapagliflozin 10 mg tomorrow  -Continue spironolactone 25 mg daily  -Daily BMP and aggressive potassium repletion hold diuresis  -Defer antibiotics to primary service  -Continue oral amiodarone per " schedule  -Eventually would plan to start NOAC when there is no further potential for invasive procedures      Cardiology will continue to follow.  Please contact me with any questions.    Romaine Clements M.D.   Cardiologist, [facility]

## 2023-12-14 NOTE — PROGRESS NOTES
[facility] ICU Resident Progress Note      Admit Date: 12/6/2023    Resident(s): Aden Carias M.D.   Attending:  RADHA MIRANDA/ Dr. Moore    Patient ID:    Name:  Richard Hubbard   YOB: 1975  Age:  48 y.o.  male   MRN:  [id number]    Hospital Course (carried forward and updated):  Richard Hubbard II is a 48 y.o. male with hx of afib, RA, bipolar, obesity, MONICA, and grade 1 diastolic dysfunction with moderate AS and PAH  who presented for surgery for progressive flatfoot deformity. Pt had open reduction of R subtalar and R talonavicular joint dislocation along with triple arthrodeses and R achilles tendon lengthening 12/6. He developed hypotension with acute renal failure and elevated LFTs. Then started to develop wound breakdown of previous L BKA, started on Ancef 12/8. Then developed afib RVR with hypotension, bedside echo concerning for cardiogenic shock so transferred to ICU.     12/10: Dobutamine off but now back on as CI < 2.2. Pt converted back to afib, rate controlled.   12/11: Lasix decreased to 40mg q8. Lots of ectopy overnight. Wound cx obtained. Stopped hydrocortisone.   12/12: Transaminases stable/downtrending. Changing Lasix from q8 to q12.  12/13: Off dobutamine, titrating levophed. CI 1.8, continue to monitor off dobutamine. Wound cx + for serratia, Cefepime started.      Consultants:  Critical Care  Cardiology     Interval Events:    12/14: CI stable off dobutamine. Titrating levophed. Pending L BKA I&D when stable.     NEURO:   A&O x4  CARDIOVASC:  Levophed  RESPIRATORY:  Stable on RA  GI/NUTRITION:  Cardiac diet  RENAL/FLUID/LYTES: UOP 3150 in last 24h. Renal function stable.   HEME/ONC:   N/A  INFECTIOUS D:  Cefepime  ENDOCRINE:   N/A    Vitals Range last 24h:  Temp:  [36.8 °C (98.2 °F)-37.1 °C (98.8 °F)] 36.9 °C (98.4 °F)  Pulse:  [81-95] 89  Resp:  [9-46] 11  BP: ()/(41-60) 102/54  SpO2:  [87 %-97 %] 89 %      Intake/Output Summary (Last 24 hours) at 12/14/2023  0837  Last data filed at 12/14/2023 0800  Gross per 24 hour   Intake 939.02 ml   Output 3000 ml   Net -2060.98 ml          Review of Systems   Constitutional:  Negative for chills, fever and malaise/fatigue.   HENT:  Negative for ear pain, hearing loss and sinus pain.    Eyes:  Negative for blurred vision, double vision and pain.   Respiratory:  Negative for cough and shortness of breath.    Cardiovascular:  Negative for chest pain, palpitations and leg swelling.   Gastrointestinal:  Negative for abdominal pain, heartburn, nausea and vomiting.   Genitourinary:  Negative for dysuria, frequency and urgency.   Musculoskeletal:  Positive for joint pain. Negative for falls.   Neurological:  Positive for tingling. Negative for dizziness, weakness and headaches.       PHYSICAL EXAM:  Vitals:    12/14/23 0615 12/14/23 0630 12/14/23 0645 12/14/23 0700   BP: 95/53 (!) 87/52 102/58 102/54   Pulse:    89   Resp:    (!) 11   Temp:       TempSrc:       SpO2:    89%   Weight:       Height:        Body mass index is 30.26 kg/m².    O2 therapy: Pulse Oximetry: 89 %, O2 (LPM): 1, O2 Delivery Device: Silicone Nasal Cannula    Date 12/14/23 0700 - 12/15/23 0659   Shift 8598-1317 6301-9799 1191-7072 24 Hour Total   INTAKE   Shift Total       OUTPUT   Urine 900   900     Output (mL) (Urethral Catheter) 900   900   Shift Total 900   900   NET -900   -900          Physical Exam  Constitutional:       General: He is not in acute distress.     Appearance: Normal appearance. He is not ill-appearing.      Comments: Sleeping comfortably when first seen   HENT:      Head: Normocephalic and atraumatic.      Nose: Nose normal.      Mouth/Throat:      Mouth: Mucous membranes are moist.      Pharynx: Oropharynx is clear.   Eyes:      General: No scleral icterus.     Extraocular Movements: Extraocular movements intact.      Pupils: Pupils are equal, round, and reactive to light.   Cardiovascular:      Rate and Rhythm: Normal rate. Rhythm irregular.       Pulses: Normal pulses.      Heart sounds: Murmur (3/6 systolic) heard.   Pulmonary:      Effort: Pulmonary effort is normal. No respiratory distress.      Breath sounds: Normal breath sounds. No wheezing.   Abdominal:      General: Abdomen is flat. Bowel sounds are normal. There is no distension.      Palpations: Abdomen is soft.      Tenderness: There is no abdominal tenderness.   Musculoskeletal:         General: Swelling and tenderness present.      Comments: L BKA wound wrapped without evidence of discharge. RLE wound dry and clean   Skin:     General: Skin is warm and dry.      Coloration: Skin is not jaundiced or pale.   Neurological:      General: No focal deficit present.      Mental Status: He is alert and oriented to person, place, and time. Mental status is at baseline.             Recent Labs     12/12/23 0440 12/12/23  1229 12/12/23  2330 12/13/23 0450 12/14/23  0330   SODIUM 136  --   --  136 136   POTASSIUM 5.0   < > 4.4 4.2 4.3   CHLORIDE 97  --   --  99 98   CO2 27  --   --  29 31   BUN 31*  --   --  25* 20   CREATININE 0.93  --   --  0.68 0.65   MAGNESIUM 2.2  --   --  2.1 2.0   PHOSPHORUS 2.4*  --   --  2.6 2.4*   CALCIUM 7.6*  --   --  7.6* 7.7*    < > = values in this interval not displayed.       Recent Labs     12/12/23 0440 12/13/23 0450 12/14/23  0330   ALTSGPT 307* 186* 131*   ASTSGOT 764* 269* 104*   ALKPHOSPHAT 230* 183* 165*   TBILIRUBIN 1.0 1.0 1.3   GLUCOSE 103* 94 99       Recent Labs     12/12/23 0440 12/13/23 0450 12/14/23  0330   RBC 3.54* 3.39* 3.52*   HEMOGLOBIN 9.4* 9.2* 9.4*   HEMATOCRIT 30.1* 29.5* 30.8*   PLATELETCT 125* 103* 102*       Recent Labs     12/12/23 0440 12/13/23 0450 12/14/23  0330   WBC 10.6 8.9 9.9   NEUTSPOLYS 79.40* 75.40* 76.30*   LYMPHOCYTES 12.40* 16.70* 15.80*   MONOCYTES 7.60 7.20 7.00   EOSINOPHILS 0.00 0.00 0.50   BASOPHILS 0.10 0.10 0.10   ASTSGOT 764* 269* 104*   ALTSGPT 307* 186* 131*   ALKPHOSPHAT 230* 183* 165*   TBILIRUBIN 1.0 1.0  1.3         Meds:   cefepime  2 g Stopped (12/14/23 0704)    lactulose  30 mL      potassium chloride SA  20 mEq      furosemide  40 mg      spironolactone  25 mg      NORepinephrine  0-1 mcg/kg/min (Ideal) 0.04 mcg/kg/min (12/14/23 0703)    MD Alert...Adult ICU Electrolyte Replacement per Pharmacy        enoxaparin (LOVENOX) injection  40 mg      amiodarone  400 mg      Followed by    [START ON 12/17/2023] amiodarone  400 mg      Followed by    [START ON 12/24/2023] amiodarone  200 mg      HYDROmorphone  1 mg      senna-docusate  2 Tablet      And    polyethylene glycol/lytes  1 Packet      And    magnesium hydroxide  30 mL      And    bisacodyl  10 mg      oxyCODONE immediate-release  15 mg      albuterol  2.5 mg      dakins 0.125% (1/4 strength)        omeprazole  20 mg      DULoxetine  60 mg          Procedures:  12/6: Open reduction of R subtalar and talonavicular joint dislocation, R foot triple arthrodesis, R achilles tendon lengthening, application of multiplanar external fixator RLE  12/9: Central and A lines placed    Imaging:  DX-CHEST-PORTABLE (1 VIEW)   Final Result         1.  Pulmonary edema and/or infiltrates are identified, which are stable since the prior exam.   2.  Trace bilateral pleural effusions   3.  Cardiomegaly      DX-CHEST-PORTABLE (1 VIEW)   Final Result         1.  Coronary edema and/or infiltrates, stable since prior study.   2.  Trace right pleural effusion, stable   3.  Cardiomegaly      US-EXTREMITY VENOUS LOWER BILAT   Final Result      EC-ECHOCARDIOGRAM COMPLETE W/ CONT   Final Result      US-RUQ   Final Result         1. Hepatic steatosis.   2. Otherwise, normal.      DX-CHEST-PORTABLE (1 VIEW)   Final Result         1. [place] catheter tip is located within a right lower lobe pulmonary arterial branch, repositioning may be indicated.   2. Stable pulmonary edema, cardiomegaly, and small pleural effusions.      DX-CHEST-PORTABLE (1 VIEW)   Final Result      1.  Small right  pleural effusion with adjacent airspace disease.   2.  Worsening interstitial infiltrates and/or edema.      DX-CHEST-LIMITED (1 VIEW)   Final Result      1.  Enlarged cardiac silhouette with changes of vascular congestion.      DX-PORTABLE FLUORO > 1 HOUR   Final Result      Portable fluoroscopy utilized for 1 minute 48 seconds.         INTERPRETING LOCATION: [address] [facility], [facility], [address]      DX-FOOT-COMPLETE 3+ RIGHT   Final Result      Digitized intraoperative radiograph is submitted for review. This examination is not for diagnostic purpose but for guidance during a surgical procedure. Please see the patient's chart for full procedural details.         INTERPRETING LOCATION: [address] , [facility], [address]          ASSESSEMENT and PLAN:    * Cardiogenic shock (HCC)  Assessment & Plan  Admitted to the ICU 12/09/23 with suspected BiV failure    - [place] guided therapy  - Continue to monitor while off dobutamine, CI stable  - Wean off levophed as tolerated  - Continue IV Lasix 40mg bid  - Goal SBP > 90    S/P BKA (below knee amputation), left (HCC)- (present on admission)  Assessment & Plan  With infected stump  - Blood cultures NGTD so far  - Wound cx + for serratia  - Started Cefepime 7d course on 12/13  - Plan for I&D of left BKA when stabilized  - Continue wound care    Elevated LFTs  Assessment & Plan  Secondary to shock liver    - Improving  - Maintain adequate perfusion  - RUQ US reassuring  - Hepatitis panel negative  - Avoid hepatotoxins    Immunosuppression (HCC)  Assessment & Plan  Holding Humira for now given infected left lower extremity    Dislocation of right subtalar joint  Assessment & Plan  He underwent open reduction of right subtalar joint dislocation, right talonavicular joint dislocation right foot triple arthrodesis, right extensor tendon lengthening and application of multiplanar external fixator right lower extremity on 12/6 by ortho  Per orthopedic surgery, patient will be nonweightbearing to  toe-touch weightbearing for transfer on the right lower extremity  - PT/OT as tolerated  - DVTppx with Lovenox  - Pain regimen  - Wound care    Hx of gastric bypass- (present on admission)  Assessment & Plan  Encourage ongoing weight loss management    Hypokalemia- (present on admission)  Assessment & Plan  Replace as needed    Acute kidney injury (HCC)- (present on admission)  Assessment & Plan  RESOLVED  Secondary to cardiogenic shock/ATN   UOP and renal function improved with diuretics     - Strict I/Os  - Renally dose meds  - Avoid nephrotoxic agents as able  - Continue to monitor    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)- (present on admission)  Assessment & Plan  Last echo from October 20, 2023 with EF 55%, grade 1 diastolic dysfunction with moderate AAS/AI and RVSP of 45 mmHg --> now in biventricular failure as seen on POCUS per Dr. Gonda 12/9    - Repeat echo 12/9 with EF 45% and severe AS  - [place] guided therapy  - Hold GDMT given shock state  - Cardiology following    Hyponatremia- (present on admission)  Assessment & Plan  Likely 2/2 heart failure  Monitor with above therapies    Chronic pain- (present on admission)  Assessment & Plan  Continue home meds    Atrial fibrillation with RVR (HCC)- (present on admission)  Assessment & Plan  History of A-fib, went into AVR 12/8, converted back to normal sinus rhythm 12/9, now back in afib 12/10    - Continued PO amiodarone  - Echo 12/9 showing EF 45% and severe AS  - Replace electrolytes prn    Sepsis (HCC)- (present on admission)  Assessment & Plan  This is Septic shock Not present on admission  SIRS criteria identified on my evaluation include: Tachycardia, with heart rate greater than 90 BPM, Tachypnea, with respirations greater than 20 per minute, and Leukocytosis, with WBC greater than 12,000  Clinical indicators of end organ dysfunction include Hypotension with systolic blood pressure less than 90 or MAP less than 65  Indicators of septic  shock include: Sepsis present and persistent hypotension despite fluid resuscitation   Sources is: Soft tissue  S/p sepsis protocol initiated 12/8  Crystalloid Fluid Administration: Resuscitation volume of 2 L ordered. Reason that resuscitation volume of less than 30ml/kg was ordered concern for causing harm given CHF  IV antibiotics as appropriate for source of sepsis  Reassessment: I have reassessed the patient's hemodynamic status  Monitor lactic acid, urine output, vital signs closely for adequacy of resuscitation    - Linezolid + ceftriaxone changed to Cefepime  - Previous wound culture from 10/2023 grew staph and strep  - Blood cultures from this admission NGTD   - Wound cx + for serratia  - Goal SBP >90 using vasopressors     Aortic stenosis due to bicuspid aortic valve- (present on admission)  Assessment & Plan  Moderate on last echo in October 2023  - Repeat echo revealing severe AS  - Maintain euvolemia    Rheumatoid arthritis (HCC)- (present on admission)  Assessment & Plan  As needed analgesics  Holding immunosuppressants for now in the setting of infection      DISPO: Continued ICU stay, pt requiring pressors    CODE STATUS: Full Code    Quality Measures:  Feeding: Cardiac diet  Analgesia: PRN oxycodone and dilaudid  Sedation: N/A  Thromboprophylaxis: Lovenox 40mg qd  Head of bed: >30 degrees  Ulcer prophylaxis: N/A  Glycemic control: N/A  Bowel care: bowel regimen in place  Indwelling lines: Central line, Posey-talat, A line, PIVs, piedra  Deescalation of antibiotics: Cefepime added due to wound cx + eliza Carias M.D. PGY-2

## 2023-12-14 NOTE — CARE PLAN
The patient is Stable - Low risk of patient condition declining or worsening    Shift Goals  Clinical Goals: CI >2, mobilize  Patient Goals: pain control  Family Goals: JI    Progress made toward(s) clinical / shift goals:  patient remained off dobutamine, weaned off levo, q2 turns, waffle mattress and TAPs in use.     Problem: Pain - Standard  Goal: Alleviation of pain or a reduction in pain to the patient’s comfort goal  Description: Target End Date:  Prior to discharge or change in level of care    Document on Vitals flowsheet    1.  Document pain using the appropriate pain scale per order or unit policy  2.  Educate and implement non-pharmacologic comfort measures (i.e. relaxation, distraction, massage, cold/heat therapy, etc.)  3.  Pain management medications as ordered  4.  Reassess pain after pain med administration per policy  5.  If opiods administered assess patient's response to pain medication is appropriate per POSS sedation scale  6.  Follow pain management plan developed in collaboration with patient and interdisciplinary team (including palliative care or pain specialists if applicable)  Outcome: Progressing     Problem: Skin Integrity  Goal: Skin integrity is maintained or improved  Description: Target End Date:  Prior to discharge or change in level of care    Document interventions on Skin Risk/Nam flowsheet groups and corresponding LDA    1.  Assess and monitor skin integrity, appearance and/or temperature  2.  Assess risk factors for impaired skin integrity and/or pressures ulcers  3.  Implement precautions to protect skin integrity in collaboration with interdisciplinary team  4.  Implement pressure ulcer prevention protocol if at risk for skin breakdown  5.  Confirm wound care consult if at risk for skin breakdown  6.  Ensure patient use of pressure relieving devices  (Low air loss bed, waffle overlay, heel protectors, ROHO cushion, etc)  Outcome: Progressing       Patient is not  progressing towards the following goals:

## 2023-12-15 NOTE — CARE PLAN
The patient is Watcher - Medium risk of patient condition declining or worsening    Shift Goals  Clinical Goals: Hemodynamic stability, mobility, debridement  Patient Goals: Pain control  Family Goals: JI    Progress made toward(s) clinical / shift goals:    Problem: Fall Risk  Goal: Patient will remain free from falls  Outcome: Progressing  Note: Appropriate interventions in place     Problem: Knowledge Deficit - Standard  Goal: Patient and family/care givers will demonstrate understanding of plan of care, disease process/condition, diagnostic tests and medications  Outcome: Progressing     Problem: Pain - Standard  Goal: Alleviation of pain or a reduction in pain to the patient’s comfort goal  Outcome: Progressing  Note: Patient medicated per MAR     Problem: Skin Integrity  Goal: Skin integrity is maintained or improved  Outcome: Progressing  Note: Wound care completed     Problem: Hemodynamics  Goal: Patient's hemodynamics, fluid balance and neurologic status will be stable or improve  Outcome: Progressing  Note: Off Levo since 0430     Problem: Fluid Volume  Goal: Fluid volume balance will be maintained  Outcome: Progressing

## 2023-12-15 NOTE — PROGRESS NOTES
[facility] Resident Progress Note      Admit Date: 12/6/2023    Resident(s): Aden Carias M.D.   Attending:  RADHA MIRANDA/ Dr. Moore    Patient ID:    Name:  Richard Hubbard   YOB: 1975  Age:  48 y.o.  male   MRN:  [id number]    Hospital Course (carried forward and updated):  Richard Hubbard II is a 48 y.o. male with hx of afib, RA, bipolar, obesity, MONICA, and grade 1 diastolic dysfunction with moderate AS and PAH  who presented for surgery for progressive flatfoot deformity. Pt had open reduction of R subtalar and R talonavicular joint dislocation along with triple arthrodeses and R achilles tendon lengthening 12/6. He developed hypotension with acute renal failure and elevated LFTs. Then started to develop wound breakdown of previous L BKA, started on Ancef 12/8. Then developed afib RVR with hypotension, bedside echo concerning for cardiogenic shock so transferred to ICU.     12/10: Dobutamine off but now back on as CI < 2.2. Pt converted back to afib, rate controlled.   12/11: Lasix decreased to 40mg q8. Lots of ectopy overnight. Wound cx obtained. Stopped hydrocortisone.   12/12: Transaminases stable/downtrending. Changing Lasix from q8 to q12.  12/13: Off dobutamine, titrating levophed. CI 1.8, continue to monitor off dobutamine. Wound cx + for serratia, Cefepime started.    12/14: CI stable off dobutamine. Titrating levophed.    Consultants:  Critical Care  Cardiology     Interval Events:    12/15: Off pressors. Plan for OR tomorrow.     NEURO:   A&O x4  CARDIOVASC:  Off pressors, soft BPs  RESPIRATORY:  Stable on RA  GI/NUTRITION:  Cardiac diet  RENAL/FLUID/LYTES: UOP 3035 in last 24h. Renal function stable.   HEME/ONC:   N/A  INFECTIOUS D:  Cefepime  ENDOCRINE:   N/A    Vitals Range last 24h:  Temp:  [36.2 °C (97.1 °F)-37.1 °C (98.8 °F)] 37.1 °C (98.8 °F)  Pulse:  [84-96] 90  Resp:  [13-52] 20  BP: ()/(47-58) 86/53  SpO2:  [85 %-99 %] 92 %      Intake/Output Summary  (Last 24 hours) at 12/15/2023 1038  Last data filed at 12/15/2023 1000  Gross per 24 hour   Intake 803.39 ml   Output 1735 ml   Net -931.61 ml          Review of Systems   Constitutional:  Negative for chills, fever and malaise/fatigue.   HENT:  Negative for ear pain, hearing loss and sinus pain.    Eyes:  Negative for blurred vision, double vision and pain.   Respiratory:  Negative for cough and shortness of breath.    Cardiovascular:  Negative for chest pain, palpitations and leg swelling.   Gastrointestinal:  Negative for abdominal pain, heartburn, nausea and vomiting.   Genitourinary:  Negative for dysuria, frequency and urgency.   Musculoskeletal:  Positive for joint pain. Negative for falls.   Neurological:  Positive for tingling. Negative for dizziness, weakness and headaches.       PHYSICAL EXAM:  Vitals:    12/15/23 0700 12/15/23 0800 12/15/23 0900 12/15/23 1000   BP: (!) 85/51 (!) 88/50 (!) 89/52 (!) 86/53   Pulse: 90 92 86 90   Resp: 20 20 20 20   Temp:       TempSrc:  Bladder  Bladder   SpO2: 96% 93% 91% 92%   Weight:       Height:        Body mass index is 28.5 kg/m².    O2 therapy: Pulse Oximetry: 92 %, O2 (LPM): 1, O2 Delivery Device: None - Room Air    Date 12/15/23 0700 - 12/16/23 0659   Shift 0180-5796 9324-4034 7215-0959 24 Hour Total   INTAKE   P.O. 0   0     P.O. 0   0   I.V. 0   0     Norepinephrine Volume 0   0   Shift Total 0   0   OUTPUT   Urine 250   250     Output (mL) (Urethral Catheter) 250   250   Shift Total 250   250   NET -250   -250          Physical Exam  Constitutional:       General: He is not in acute distress.     Appearance: Normal appearance. He is not ill-appearing.      Comments: Sleeping comfortably when first seen   HENT:      Head: Normocephalic and atraumatic.      Nose: Nose normal.      Mouth/Throat:      Mouth: Mucous membranes are moist.      Pharynx: Oropharynx is clear.   Eyes:      General: No scleral icterus.     Extraocular Movements: Extraocular movements  intact.      Pupils: Pupils are equal, round, and reactive to light.   Cardiovascular:      Rate and Rhythm: Normal rate. Rhythm irregular.      Pulses: Normal pulses.      Heart sounds: Murmur (3/6 systolic) heard.   Pulmonary:      Effort: Pulmonary effort is normal. No respiratory distress.      Breath sounds: Normal breath sounds. No wheezing.   Abdominal:      General: Abdomen is flat. Bowel sounds are normal. There is no distension.      Palpations: Abdomen is soft.      Tenderness: There is no abdominal tenderness.   Musculoskeletal:         General: No swelling or tenderness.      Comments: L BKA wound wrapped without evidence of discharge. RLE wound dry and clean   Skin:     General: Skin is warm and dry.      Coloration: Skin is not jaundiced or pale.   Neurological:      General: No focal deficit present.      Mental Status: He is alert and oriented to person, place, and time. Mental status is at baseline.             Recent Labs     12/13/23  0450 12/14/23  0330 12/15/23  0445   SODIUM 136 136 137   POTASSIUM 4.2 4.3 4.5   CHLORIDE 99 98 100   CO2 29 31 30   BUN 25* 20 16   CREATININE 0.68 0.65 0.63   MAGNESIUM 2.1 2.0 2.1   PHOSPHORUS 2.6 2.4* 3.1   CALCIUM 7.6* 7.7* 7.9*       Recent Labs     12/13/23  0450 12/14/23  0330 12/15/23  0445   ALTSGPT 186* 131* 81*   ASTSGOT 269* 104* 45   ALKPHOSPHAT 183* 165* 149*   TBILIRUBIN 1.0 1.3 1.5   GLUCOSE 94 99 83       Recent Labs     12/13/23  0450 12/14/23  0330 12/15/23  0445   RBC 3.39* 3.52* 3.42*   HEMOGLOBIN 9.2* 9.4* 9.2*   HEMATOCRIT 29.5* 30.8* 30.0*   PLATELETCT 103* 102* 93*       Recent Labs     12/13/23  0450 12/14/23  0330 12/15/23  0445   WBC 8.9 9.9 9.3   NEUTSPOLYS 75.40* 76.30* 74.90*   LYMPHOCYTES 16.70* 15.80* 14.60*   MONOCYTES 7.20 7.00 8.30   EOSINOPHILS 0.00 0.50 1.70   BASOPHILS 0.10 0.10 0.10   ASTSGOT 269* 104* 45   ALTSGPT 186* 131* 81*   ALKPHOSPHAT 183* 165* 149*   TBILIRUBIN 1.0 1.3 1.5         Meds:   torsemide  20 mg       dapagliflozin propanediol  10 mg      cefepime  2 g Stopped (12/15/23 4586)    lactulose  30 mL      potassium chloride SA  20 mEq      spironolactone  25 mg      NORepinephrine  0-1 mcg/kg/min (Ideal) Stopped (12/15/23 7814)    MD Alert...Adult ICU Electrolyte Replacement per Pharmacy        enoxaparin (LOVENOX) injection  40 mg      amiodarone  400 mg      Followed by    [START ON 12/17/2023] amiodarone  400 mg      Followed by    [START ON 12/24/2023] amiodarone  200 mg      HYDROmorphone  1 mg      senna-docusate  2 Tablet      And    polyethylene glycol/lytes  1 Packet      And    magnesium hydroxide  30 mL      And    bisacodyl  10 mg      oxyCODONE immediate-release  15 mg      albuterol  2.5 mg      dakins 0.125% (1/4 strength)        omeprazole  20 mg      DULoxetine  60 mg          Procedures:  12/6: Open reduction of R subtalar and talonavicular joint dislocation, R foot triple arthrodesis, R achilles tendon lengthening, application of multiplanar external fixator RLE  12/9: Central and A lines placed    Imaging:  DX-CHEST-PORTABLE (1 VIEW)   Final Result         1.  Pulmonary edema and/or infiltrates are identified, which are stable since the prior exam.   2.  Trace bilateral pleural effusions   3.  Cardiomegaly      DX-CHEST-PORTABLE (1 VIEW)   Final Result         1.  Coronary edema and/or infiltrates, stable since prior study.   2.  Trace right pleural effusion, stable   3.  Cardiomegaly      US-EXTREMITY VENOUS LOWER BILAT   Final Result      EC-ECHOCARDIOGRAM COMPLETE W/ CONT   Final Result      US-RUQ   Final Result         1. Hepatic steatosis.   2. Otherwise, normal.      DX-CHEST-PORTABLE (1 VIEW)   Final Result         1. [place] catheter tip is located within a right lower lobe pulmonary arterial branch, repositioning may be indicated.   2. Stable pulmonary edema, cardiomegaly, and small pleural effusions.      DX-CHEST-PORTABLE (1 VIEW)   Final Result      1.  Small right pleural effusion  with adjacent airspace disease.   2.  Worsening interstitial infiltrates and/or edema.      DX-CHEST-LIMITED (1 VIEW)   Final Result      1.  Enlarged cardiac silhouette with changes of vascular congestion.      DX-PORTABLE FLUORO > 1 HOUR   Final Result      Portable fluoroscopy utilized for 1 minute 48 seconds.         INTERPRETING LOCATION: [address] , [place], [address]      DX-FOOT-COMPLETE 3+ RIGHT   Final Result      Digitized intraoperative radiograph is submitted for review. This examination is not for diagnostic purpose but for guidance during a surgical procedure. Please see the patient's chart for full procedural details.         INTERPRETING LOCATION: [address] , [facility], [address]          ASSESSEMENT and PLAN:    * Cardiogenic shock (HCC)  Assessment & Plan  Admitted to the ICU 12/09/23 with suspected BiV failure    - Continue to monitor while off pressors  - IV lasix changed to PO torsemide  - Goal SBP > 90    S/P BKA (below knee amputation), left (HCC)- (present on admission)  Assessment & Plan  With infected stump  - Blood cultures NGTD so far  - Wound cx + for serratia  - Started Cefepime 7d course on 12/13  - Plan for I&D of left BKA 12/16  - Continue wound care    Elevated LFTs  Assessment & Plan  Secondary to shock liver    - Improving  - Maintain adequate perfusion  - RUQ US reassuring  - Hepatitis panel negative  - Avoid hepatotoxins    Immunosuppression (HCC)  Assessment & Plan  Holding Humira for now given infected left lower extremity    Dislocation of right subtalar joint  Assessment & Plan  He underwent open reduction of right subtalar joint dislocation, right talonavicular joint dislocation right foot triple arthrodesis, right extensor tendon lengthening and application of multiplanar external fixator right lower extremity on 12/6 by ortho  Per orthopedic surgery, patient will be nonweightbearing to toe-touch weightbearing for transfer on the right lower extremity  - PT/OT as tolerated  -  DVTppx with Lovenox  - Pain regimen  - Wound care    Hx of gastric bypass- (present on admission)  Assessment & Plan  Encourage ongoing weight loss management    Hypokalemia- (present on admission)  Assessment & Plan  Replace as needed    Acute kidney injury (HCC)- (present on admission)  Assessment & Plan  RESOLVED  Secondary to cardiogenic shock/ATN   UOP and renal function improved with diuretics     - Strict I/Os  - Renally dose meds  - Avoid nephrotoxic agents as able  - Continue to monitor    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)- (present on admission)  Assessment & Plan  Last echo from October 20, 2023 with EF 55%, grade 1 diastolic dysfunction with moderate AAS/AI and RVSP of 45 mmHg --> now in biventricular failure as seen on POCUS per Dr. Gonda 12/9    - Repeat echo 12/9 with EF 45% and severe AS  - Can remove [place]  - Hold GDMT other than Dapagliflozin for now  - Cardiology following    Hyponatremia- (present on admission)  Assessment & Plan  Likely 2/2 heart failure  Monitor with above therapies    Chronic pain- (present on admission)  Assessment & Plan  Continue home meds    Atrial fibrillation with RVR (HCC)- (present on admission)  Assessment & Plan  History of A-fib, went into AVR 12/8, converted back to normal sinus rhythm 12/9, now back in afib 12/10    - Continued PO amiodarone  - Echo 12/9 showing EF 45% and severe AS  - Replace electrolytes prn    Sepsis (HCC)- (present on admission)  Assessment & Plan  This is Septic shock Not present on admission  SIRS criteria identified on my evaluation include: Tachycardia, with heart rate greater than 90 BPM, Tachypnea, with respirations greater than 20 per minute, and Leukocytosis, with WBC greater than 12,000  Clinical indicators of end organ dysfunction include Hypotension with systolic blood pressure less than 90 or MAP less than 65  Indicators of septic shock include: Sepsis present and persistent hypotension despite fluid  resuscitation   Sources is: Soft tissue  S/p sepsis protocol initiated 12/8  Crystalloid Fluid Administration: Resuscitation volume of 2 L ordered. Reason that resuscitation volume of less than 30ml/kg was ordered concern for causing harm given CHF  IV antibiotics as appropriate for source of sepsis  Reassessment: I have reassessed the patient's hemodynamic status  Monitor lactic acid, urine output, vital signs closely for adequacy of resuscitation    - Linezolid + ceftriaxone changed to Cefepime  - Previous wound culture from 10/2023 grew staph and strep  - Blood cultures from this admission NGTD   - Wound cx + for serratia  - Goal SBP >90 using vasopressors     Aortic stenosis due to bicuspid aortic valve- (present on admission)  Assessment & Plan  Moderate on last echo in October 2023  - Repeat echo revealing severe AS  - Maintain euvolemia    Rheumatoid arthritis (HCC)- (present on admission)  Assessment & Plan  As needed analgesics  Holding immunosuppressants for now in the setting of infection      DISPO: Continued ICU stay, pt just weaned off pressors. Consider downgrade either in late afternoon or tomorrow.     CODE STATUS: Full Code    Quality Measures:  Feeding: Cardiac diet  Analgesia: PRN oxycodone and dilaudid  Sedation: N/A  Thromboprophylaxis: Lovenox 40mg qd  Head of bed: >30 degrees  Ulcer prophylaxis: N/A  Glycemic control: N/A  Bowel care: bowel regimen in place  Indwelling lines: Central line, [place]-talat, A line, PIVs, piedra  Deescalation of antibiotics: Cefepime added due to wound cx + eliza Carias M.D. PGY-2

## 2023-12-15 NOTE — DISCHARGE PLANNING
Case Management Discharge Planning    Admission Date: 12/6/2023  GMLOS: 7.3  ALOS: 9    6-Clicks ADL Score: 12  6-Clicks Mobility Score: 9  PT and/or OT Eval ordered: Yes  Post-acute Referrals Ordered: Yes  Post-acute Choice Obtained: No  Has referral(s) been sent to post-acute provider:  No      Anticipated Discharge Dispo: Discharge Disposition: Disch to  rehab facility or distinct part unit (62)  Discharge Address: [facility] [address] [facility]   [place] [id number]    DME Needed: No    Action(s) Taken: Updated Provider/Nurse on Discharge Plan    Cart review complete; Pt was discussed in IDT rounds today; Pt remains critically ill at this time; PT/OT recommending post-acute placement; Pt currently on service with Sameera RUSS; LSW will continue to follow to assist with any CM needs.     Escalations Completed: None    Medically Clear: No    Next Steps:  f/u with pt and medical team to discuss dc needs and barriers.     Barriers to Discharge: Medical clearance and Pending Placement    Is the patient up for discharge tomorrow: No

## 2023-12-15 NOTE — CARE PLAN
The patient is Watcher - Medium risk of patient condition declining or worsening    Shift Goals  Clinical Goals: wean vasopressor, sleep comfortably  Patient Goals: pain control  Family Goals: elias    Progress made toward(s) clinical / shift goals:    Problem: Pain - Standard  Goal: Alleviation of pain or a reduction in pain to the patient’s comfort goal  Outcome: Progressing     Problem: Skin Integrity  Goal: Skin integrity is maintained or improved  Outcome: Progressing

## 2023-12-15 NOTE — PROGRESS NOTES
Cardiology Progress Note    Date of Service  12/15/2023    Events/Subjective  NE weaned off with stable blood pressures  Creatinine stable  Remains in sinus rhythm    Vitals  Temp:  [36.2 °C (97.1 °F)-37.1 °C (98.8 °F)] 37.1 °C (98.8 °F)  Pulse:  [84-96] 90  Resp:  [13-43] 20  BP: ()/(47-58) 86/53  SpO2:  [85 %-99 %] 92 %    Physical Exam  General: Chronically ill-appearing, in no acute distress  Eyes: Extraocular movements intact, anicteric  Neck: Full range of motion, unable to assess JVP with sheath in place  Pulmonary: Normal respiratory effort, remains on supplemental oxygen  Cardiovascular: Regular rate, regular rhythm  Gastrointestinal: Obese, nondistended  Extremities: Left lower extremity BKA, right lower extremity in fixation device  Neurological: Less lethargic today, answering questions appropriately    Laboratories  Lab Results   Component Value Date/Time    WBC 9.3 12/15/2023 04:45 AM    WBC 8.4 10/19/2010 12:05 PM    RBC 3.42 (L) 12/15/2023 04:45 AM    RBC 4.96 10/19/2010 12:05 PM    HEMOGLOBIN 9.2 (L) 12/15/2023 04:45 AM    HEMATOCRIT 30.0 (L) 12/15/2023 04:45 AM    MCV 87.7 12/15/2023 04:45 AM    MCV 85 10/19/2010 12:05 PM    MCH 26.9 (L) 12/15/2023 04:45 AM    MCH 28.4 10/19/2010 12:05 PM    MCHC 30.7 (L) 12/15/2023 04:45 AM    MPV 10.3 12/15/2023 04:45 AM      Lab Results   Component Value Date/Time    SODIUM 137 12/15/2023 04:45 AM    POTASSIUM 4.5 12/15/2023 04:45 AM    CHLORIDE 100 12/15/2023 04:45 AM    CO2 30 12/15/2023 04:45 AM    GLUCOSE 83 12/15/2023 04:45 AM    BUN 16 12/15/2023 04:45 AM    CREATININE 0.63 12/15/2023 04:45 AM    CREATININE 0.95 10/19/2010 12:05 PM    BUNCREATRAT 9 10/19/2010 12:05 PM    GLOMRATE >59 10/19/2010 12:05 PM      Lab Results   Component Value Date/Time    ASTSGOT 45 12/15/2023 04:45 AM    ALTSGPT 81 (H) 12/15/2023 04:45 AM     Lab Results   Component Value Date/Time    CHOLSTRLTOT 130 01/28/2021 03:30 PM    LDL 48 01/28/2021 03:30 PM    HDL 68 01/28/2021  "03:30 PM    TRIGLYCERIDE 72 01/28/2021 03:30 PM    TROPONINT 27 (H) 12/08/2023 10:03 PM       No results for input(s): \"NTPROBNP\" in the last 72 hours.    Telemetry  Sinus rhythm    Studies  TTE (12/9/2023)  The left ventricle is mildly dilated. The left ventricular ejection fraction is visually estimated to be 45%.  Mildly reduced left ventricular systolic function.  Normal right ventricular size and systolic function.  Moderately dilated left atrium  Aortic valve not well viauzlied; possibly bicuspid aortic valve.  Severe aortic valve stenosis.  Moderate to severe aortic insufficiency.  Moderate to severe mitral regurgitation.  Mild tricuspid regurgitation.   Right atrial pressure is estimated to be 15 mmHg. Estimated right ventricular systolic pressure is 66 mmHg; severely elevated.  The ascending aorta is moderately dilated with a diameter of 4.5 cm.    Assessment  # Probable mixed septic and cardiogenic shock, resolving  # Atrial fibrillation/flutter with RVR, converted to sinus rhythm  # Severe bicuspid aortic valve stenosis/regurgitation  # Moderate to severe mitral regurgitation  # Acute heart failure with reduced ejection fraction (LVEF 45%)  # Cardiogenic shock developed in the setting of atrial fibrillation with RVR and severe bicuspid aortic valve stenosis.  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  Please contact me with " any questions.    Romaine Clements M.D.   Cardiologist, [facility]

## 2023-12-15 NOTE — PROGRESS NOTES
Critical Care Progress Note    Date of admission  12/6/2023    Chief Complaint  48 y.o. male admitted 12/6/2023 with a dislocated right subtalar joint.  He has a history of a bicuspid aortic valve with aortic stenosis, left BKA, PAF, rheumatoid arthritis, chronic pain, bipolar disorder.    Hospital Course      12/9 admitted to the ICU for cardiogenic shock and organ dysfunction s/p IVF  12/10 improved hemodynamics and organ dysfunction; less vasopressors/inotropic support    12/11 -    titrating dobutamine.  Force diuresis.  Replete potassium.  Continue ceftriaxone and linezolid.  12/12 -    titrating dobutamine.  Titrating norepinephrine.  Force diuresis.  Continue ceftriaxone and linezolid.  12/13 -    dobutamine has been successfully titrated off.  Titrating norepinephrine.  Continue diuresis.  Change ceftriaxone and linezolid to cefepime.  Wound culture with Serratia species.  12/14 -    titrating norepinephrine.  Remove [place]-Aung catheter.  Continue cefepime.  12/15 -    titrating norepinephrine.  Continue cefepime.      Interval Problem Update  Reviewed last 24 hour events:      SR  NE  98.8  -2135 mL in the last 24  -7597 mL since admit      Review of Systems  Review of Systems   Unable to perform ROS: Acuity of condition        Vital Signs for last 24 hours   Temp:  [36.2 °C (97.1 °F)-37.1 °C (98.8 °F)] 37.1 °C (98.8 °F)  Pulse:  [84-95] 90  Resp:  [13-33] 20  BP: ()/(47-58) 86/53  SpO2:  [85 %-99 %] 92 %    Hemodynamic parameters for last 24 hours  PCWP:  [23 MM HG] 23 MM HG  CO:  [5.2] 5.2  CI:  [2.4] 2.4    Respiratory Information for the last 24 hours       Physical Exam   Physical Exam  Constitutional:       Appearance: He is not diaphoretic.   HENT:      Head: Normocephalic.      Mouth/Throat:      Pharynx: Oropharynx is clear.   Eyes:      Pupils: Pupils are equal, round, and reactive to light.   Cardiovascular:      Comments: Sinus rhythm  Pulmonary:      Breath sounds: Rales (Unchanged  crackles) present. No wheezing.   Abdominal:      General: There is no distension.      Tenderness: There is no abdominal tenderness.   Musculoskeletal:      Comments: Left BKA.  Left BKA wound dressed.  External fixator on the right lower extremity.   Skin:     General: Skin is warm.   Neurological:      General: No focal deficit present.      Mental Status: He is oriented to person, place, and time.      Cranial Nerves: No cranial nerve deficit.         Medications  Current Facility-Administered Medications   Medication Dose Route Frequency Provider Last Rate Last Admin    [START ON 12/16/2023] potassium chloride SA (Kdur) tablet 20 mEq  20 mEq Oral DAILY Romaine Clements M.D.        torsemide (Demadex) tablet 20 mg  20 mg Oral Q DAY Romaine Clements M.D.        dapagliflozin propanediol (Farxiga) tablet 10 mg  10 mg Oral DAILY Romaine Clements M.D.   10 mg at 12/15/23 0509    cefepime (Maxipime) 2 g in  mL IVPB  2 g Intravenous Q8HRS You Moore M.D.   Stopped at 12/15/23 0544    lactulose 20 GM/30ML solution 30 mL  30 mL Oral TID You Moore M.D.   30 mL at 12/14/23 1716    spironolactone (Aldactone) tablet 25 mg  25 mg Oral Q DAY Romaine Clements M.D.   25 mg at 12/14/23 0632    norepinephrine (Levophed) 8 mg in 250 mL NS infusion (premix)  0-1 mcg/kg/min (Ideal) Intravenous Continuous You Moore M.D.   Stopped at 12/15/23 0438    MD Alert...ICU Electrolyte Replacement per Pharmacy   Other PHARMACY TO DOSE Danny Dobbs M.D.        enoxaparin (Lovenox) inj 40 mg  40 mg Subcutaneous DAILY AT 1800 Danny Dobbs M.D.   40 mg at 12/14/23 1716    amiodarone (Cordarone) tablet 400 mg  400 mg Oral TWICE DAILY Danny Dobbs M.D.   400 mg at 12/15/23 0508    Followed by    [START ON 12/17/2023] amiodarone (Cordarone) tablet 400 mg  400 mg Oral Q DAY Danny Dobbs M.D.        Followed by    [START ON 12/24/2023] amiodarone (Cordarone) tablet 200 mg  200 mg  Oral Q DAY Danny Dobbs M.D.        HYDROmorphone (Dilaudid) injection 1 mg  1 mg Intravenous Q4HRS PRN Danny Dobbs M.D.   1 mg at 12/15/23 0520    senna-docusate (Pericolace Or Senokot S) 8.6-50 MG per tablet 2 Tablet  2 Tablet Oral BID Danny Dobbs M.D.   2 Tablet at 12/14/23 1715    And    polyethylene glycol/lytes (Miralax) Packet 1 Packet  1 Packet Oral QDAY PRN Danny Dobbs M.D.   1 Packet at 12/13/23 0518    And    magnesium hydroxide (Milk Of Magnesia) suspension 30 mL  30 mL Oral QDAY PRN Danny Dobbs M.D.   30 mL at 12/13/23 0516    And    bisacodyl (Dulcolax) suppository 10 mg  10 mg Rectal QDAY PRN Danny Dobbs M.D.        oxycodone (Oxy-IR) immediate release tablet 15 mg  15 mg Oral Q6HRS PRN Gil Dalton M.D.   15 mg at 12/15/23 0907    albuterol (Proventil) 2.5mg/0.5ml nebulizer solution 2.5 mg  2.5 mg Nebulization Q4H PRN (RT) Gil Dalton M.D.        dakins 0.125% (1/4 strength) topical soln   Topical BID Calvin Pryor M.D.   473 mL at 12/15/23 0514    omeprazole (PriLOSEC) capsule 20 mg  20 mg Oral DAILY Gil Dalton M.D.   20 mg at 12/15/23 0508    DULoxetine (Cymbalta) capsule 60 mg  60 mg Oral DAILY Torsten Grove M.D.   60 mg at 12/15/23 0508       Fluids    Intake/Output Summary (Last 24 hours) at 12/15/2023 1127  Last data filed at 12/15/2023 1000  Gross per 24 hour   Intake 563.39 ml   Output 1585 ml   Net -1021.61 ml       Laboratory          Recent Labs     12/13/23  0450 12/14/23  0330 12/15/23  0445   SODIUM 136 136 137   POTASSIUM 4.2 4.3 4.5   CHLORIDE 99 98 100   CO2 29 31 30   BUN 25* 20 16   CREATININE 0.68 0.65 0.63   MAGNESIUM 2.1 2.0 2.1   PHOSPHORUS 2.6 2.4* 3.1   CALCIUM 7.6* 7.7* 7.9*     Recent Labs     12/13/23  0450 12/14/23  0330 12/15/23  0445   ALTSGPT 186* 131* 81*   ASTSGOT 269* 104* 45   ALKPHOSPHAT 183* 165* 149*   TBILIRUBIN 1.0 1.3 1.5   GLUCOSE 94 99 83     Recent Labs     12/13/23  0450 12/14/23 0330  12/15/23  0445   WBC 8.9 9.9 9.3   NEUTSPOLYS 75.40* 76.30* 74.90*   LYMPHOCYTES 16.70* 15.80* 14.60*   MONOCYTES 7.20 7.00 8.30   EOSINOPHILS 0.00 0.50 1.70   BASOPHILS 0.10 0.10 0.10   ASTSGOT 269* 104* 45   ALTSGPT 186* 131* 81*   ALKPHOSPHAT 183* 165* 149*   TBILIRUBIN 1.0 1.3 1.5     Recent Labs     12/13/23  0450 12/14/23  0330 12/15/23  0445   RBC 3.39* 3.52* 3.42*   HEMOGLOBIN 9.2* 9.4* 9.2*   HEMATOCRIT 29.5* 30.8* 30.0*   PLATELETCT 103* 102* 93*       Imaging  None    Assessment/Plan  * Cardiogenic shock (HCC)  Assessment & Plan  He is off dobutamine  I am titrating norepinephrine to keep mean arterial pressure greater than 65    Sepsis (HCC)- (present on admission)  Assessment & Plan  Developed after admission  Skin and soft tissue source  Continue cefepime    Pulmonary hypertension (HCC)  Assessment & Plan  WHO group 2 (combined systolic and diastolic heart failure, severe aortic stenosis/regurgitation, moderate to severe mitral regurgitation)  RVSP 66 mmHg  Continue torsemide, 20 mg daily    Elevated LFTs  Assessment & Plan  Due to ischemic hepatopathy from heart failure  Trend liver enzymes and synthetic function  Avoid hepatotoxins  Improving    Dislocation of right subtalar joint  Assessment & Plan  S/P ORIF on 12/6    Acute kidney injury (HCC)- (present on admission)  Assessment & Plan  Monitor renal function and urine output  Avoid nephrotoxins and renal dose medications  Resolved    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)- (present on admission)  Assessment & Plan  LVEF 45%  Grade 3 diastolic dysfunction  Continue spironolactone, 25 mg daily  Continue torsemide, 20 mg daily  Continue dapagliflozin, 10 mg daily  I am titrating norepinephrine to keep mean arterial pressure greater than 65    S/P BKA (below knee amputation), left (HCC)- (present on admission)  Assessment & Plan  Infected left BKA stump  Wound culture with Serratia species  Continue cefepime  Plan for I&D when  medically improved  Wound care    Chronic pain- (present on admission)  Assessment & Plan  Continue as needed analgesics    Atrial fibrillation with RVR (HCC)- (present on admission)  Assessment & Plan  Continue amiodarone, 400 mg twice daily  Optimize potassium and magnesium  He is in sinus rhythm    Aortic stenosis due to bicuspid aortic valve- (present on admission)  Assessment & Plan  Severe aortic stenosis due to bicuspid aortic valve  Mean valve gradient 48 mmHg  Associated moderate to severe aortic insufficiency  Continue spironolactone, 25 mg daily  Continue torsemide, 20 mg daily    Immunosuppression (HCC)  Assessment & Plan  Hold adalimumab with infected left BKA stump    Hx of gastric bypass- (present on admission)  Assessment & Plan       Rheumatoid arthritis (HCC)- (present on admission)  Assessment & Plan  Hold adalimumab due to infected left BKA stump         VTE:  Lovenox  Ulcer: Not Indicated  Lines: Central Line  Ongoing indication addressed and Rivera Catheter  Ongoing indication addressed    I have performed a physical exam and reviewed and updated ROS and Plan today (12/15/2023). In review of yesterday's note (12/14/2023), there are no changes except as documented above.     I have assessed and reassessed his respiratory status, hemodynamics, blood pressure, cardiovascular status with titration of norepinephrine.  He is at increased risk for worsening respiratory and cardiovascular system dysfunction.    Discussed patient condition and risk of morbidity and/or mortality with RN, RT, Pharmacy, Charge nurse / hot rounds, and QA team    The patient remains critically ill.  Critical care time = 35 minutes in directly providing and coordinating critical care and extensive data review.  No time overlap and excludes procedures.    You Moore MD  Pulmonary and Critical Care Medicine

## 2023-12-16 NOTE — ANESTHESIA PROCEDURE NOTES
Airway    Date/Time: 12/16/2023 3:27 PM    Performed by: Steve Gomes M.D.  Authorized by: Steve Gomes M.D.    Location:  OR  Urgency:  Elective  Indications for Airway Management:  Anesthesia      Spontaneous Ventilation: absent    Sedation Level:  Deep  Preoxygenated: Yes    Mask Difficulty Assessment:  0 - not attempted  Final Airway Type:  Supraglottic airway  Final Supraglottic Airway:  Standard LMA    SGA Size:  5  Number of Attempts at Approach:  1

## 2023-12-16 NOTE — PROGRESS NOTES
Cardiology Progress Note    Date of Service  12/16/2023    Events/Subjective  No vasoactive support  Creatinine stable  Remains in sinus rhythm  Plan for OR today for debridement    Vitals  Temp:  [37 °C (98.6 °F)-37.3 °C (99.1 °F)] 37.2 °C (99 °F)  Pulse:  [83-93] 89  Resp:  [12-46] 30  BP: (86-98)/(51-60) 94/53  SpO2:  [92 %-98 %] 95 %    Physical Exam  General: Chronically ill-appearing, in no acute distress  Eyes: Extraocular movements intact, anicteric  Neck: Full range of motion, unable to assess JVP with sheath in place  Pulmonary: Normal respiratory effort, remains on supplemental oxygen  Cardiovascular: Regular rate, regular rhythm  Gastrointestinal: Obese, nondistended  Extremities: Left lower extremity BKA, right lower extremity in fixation device  Neurological: Less lethargic today, answering questions appropriately    Laboratories  Lab Results   Component Value Date/Time    WBC 8.6 12/16/2023 08:55 AM    WBC 8.4 10/19/2010 12:05 PM    RBC 3.49 (L) 12/16/2023 08:55 AM    RBC 4.96 10/19/2010 12:05 PM    HEMOGLOBIN 9.3 (L) 12/16/2023 08:55 AM    HEMATOCRIT 30.8 (L) 12/16/2023 08:55 AM    MCV 88.3 12/16/2023 08:55 AM    MCV 85 10/19/2010 12:05 PM    MCH 26.6 (L) 12/16/2023 08:55 AM    MCH 28.4 10/19/2010 12:05 PM    MCHC 30.2 (L) 12/16/2023 08:55 AM    MPV 10.0 12/16/2023 08:55 AM      Lab Results   Component Value Date/Time    SODIUM 135 12/16/2023 08:55 AM    POTASSIUM 4.6 12/16/2023 08:55 AM    CHLORIDE 103 12/16/2023 08:55 AM    CO2 26 12/16/2023 08:55 AM    GLUCOSE 81 12/16/2023 08:55 AM    BUN 14 12/16/2023 08:55 AM    CREATININE 0.53 12/16/2023 08:55 AM    CREATININE 0.95 10/19/2010 12:05 PM    BUNCREATRAT 9 10/19/2010 12:05 PM    GLOMRATE >59 10/19/2010 12:05 PM      Lab Results   Component Value Date/Time    ASTSGOT 26 12/16/2023 08:55 AM    ALTSGPT 55 (H) 12/16/2023 08:55 AM     Lab Results   Component Value Date/Time    CHOLSTRLTOT 130 01/28/2021 03:30 PM    LDL 48 01/28/2021 03:30 PM    HDL 68  "01/28/2021 03:30 PM    TRIGLYCERIDE 72 01/28/2021 03:30 PM    TROPONINT 27 (H) 12/08/2023 10:03 PM       No results for input(s): \"NTPROBNP\" in the last 72 hours.    Telemetry  Sinus rhythm    Studies  TTE (12/9/2023)  The left ventricle is mildly dilated. The left ventricular ejection fraction is visually estimated to be 45%.  Mildly reduced left ventricular systolic function.  Normal right ventricular size and systolic function.  Moderately dilated left atrium  Aortic valve not well viauzlied; possibly bicuspid aortic valve.  Severe aortic valve stenosis.  Moderate to severe aortic insufficiency.  Moderate to severe mitral regurgitation.  Mild tricuspid regurgitation.   Right atrial pressure is estimated to be 15 mmHg. Estimated right ventricular systolic pressure is 66 mmHg; severely elevated.  The ascending aorta is moderately dilated with a diameter of 4.5 cm.    Assessment  # Probable mixed septic and cardiogenic shock, resolving  # Atrial fibrillation/flutter with RVR, converted to sinus rhythm  # Severe bicuspid aortic valve stenosis/regurgitation  # Moderate to severe mitral regurgitation  # Acute heart failure with reduced ejection fraction (LVEF 45%), now appears euvolemic  # Cardiogenic shock developed in the setting of atrial fibrillation with RVR and severe bicuspid aortic valve stenosis.  # Cardiorenal syndrome with acute kidney injury, resolved  # Acute liver injury due to shock, resolving  # Pulmonary hypertension, likely mixed WHO 2/3 disease  # Hyperkalemia in the setting of KATERYNA, resolved    Recommendation  -Continue torsemide 20 mg   -Continue dapagliflozin 10 mg  -Continue spironolactone 25 mg daily  -Daily BMP and aggressive potassium repletion  -BP remains borderline for BB or ANNIKA inhibitor  -Continue oral amiodarone per schedule  -Eventually would plan to start NOAC when there is no further potential for invasive procedures  -Defer antibiotics to primary service  -Plan for return to OR today. " Would recommend against administration of large volumes of IV fluids. Would recommend against use of phenylephrine for BP support in favor of norepinephrine or other agents with B-adrenergic activity. He remains at high risk for decompensation with surgery, however, would recommend proceeding with surgeries that are felt to be necessary.      Cardiology will continue to follow.  Please contact me with any questions.    Romaine Clements M.D.   Cardiologist, [facility]

## 2023-12-16 NOTE — PROGRESS NOTES
Critical Care Progress Note    Date of admission  12/6/2023    Chief Complaint  48 y.o. male admitted 12/6/2023 with a dislocated right subtalar joint.  He has a history of a bicuspid aortic valve with aortic stenosis, left BKA, PAF, rheumatoid arthritis, chronic pain, bipolar disorder.    Hospital Course      12/9 admitted to the ICU for cardiogenic shock and organ dysfunction s/p IVF  12/10 improved hemodynamics and organ dysfunction; less vasopressors/inotropic support    12/11 -    titrating dobutamine.  Force diuresis.  Replete potassium.  Continue ceftriaxone and linezolid.  12/12 -    titrating dobutamine.  Titrating norepinephrine.  Force diuresis.  Continue ceftriaxone and linezolid.  12/13 -    dobutamine has been successfully titrated off.  Titrating norepinephrine.  Continue diuresis.  Change ceftriaxone and linezolid to cefepime.  Wound culture with Serratia species.  12/14 -    titrating norepinephrine.  Remove [place]-Aung catheter.  Continue cefepime.  12/15 -    titrating norepinephrine.  Continue cefepime.  12/16 -    he is off vasopressors.  Continue cefepime.  For I&D today.  Continue torsemide, spironolactone and dapagliflozin.  12/17 -    S/P I&D of left BKA wound yesterday.  Continue cefepime.  Titrating norepinephrine.      Interval Problem Update  Reviewed last 24 hour events:      SR  NE  99.3  +235 mL in the last 24  -7872 mL since admit      Review of Systems  Review of Systems   Unable to perform ROS: Acuity of condition        Vital Signs for last 24 hours   Temp:  [36.2 °C (97.2 °F)-37.1 °C (98.8 °F)] 37.1 °C (98.8 °F)  Pulse:  [62-99] 82  Resp:  [11-63] 21  BP: ()/(46-59) 95/50  SpO2:  [90 %-100 %] 96 %    Hemodynamic parameters for last 24 hours       Respiratory Information for the last 24 hours       Physical Exam   Physical Exam  Constitutional:       Appearance: He is not diaphoretic.   HENT:      Head: Normocephalic.      Nose: Nose normal.   Eyes:      General: No scleral  icterus.     Pupils: Pupils are equal, round, and reactive to light.   Cardiovascular:      Comments: Sinus rhythm  Pulmonary:      Breath sounds: Rales (Few crackles) present. No wheezing.   Abdominal:      General: There is no distension.      Tenderness: There is no abdominal tenderness. There is no guarding.   Musculoskeletal:      Cervical back: Normal range of motion.      Comments: Left BKA.  Left BKA wound dressed.  External fixator on the right lower extremity.   Skin:     General: Skin is warm.         Medications  Current Facility-Administered Medications   Medication Dose Route Frequency Provider Last Rate Last Admin    torsemide (Demadex) tablet 20 mg  20 mg Oral Q DAY Romaine Clements M.D.   20 mg at 12/17/23 0523    dapagliflozin propanediol (Farxiga) tablet 10 mg  10 mg Oral DAILY Romaine Clements M.D.   10 mg at 12/17/23 0513    cefepime (Maxipime) 2 g in  mL IVPB  2 g Intravenous Q8HRS You Moore M.D.   Stopped at 12/17/23 0548    lactulose 20 GM/30ML solution 30 mL  30 mL Oral TID You Moore M.D.   30 mL at 12/16/23 0520    spironolactone (Aldactone) tablet 25 mg  25 mg Oral Q DAY Romaine Clements M.D.   25 mg at 12/17/23 0509    norepinephrine (Levophed) 8 mg in 250 mL NS infusion (premix)  0-1 mcg/kg/min (Ideal) Intravenous Continuous You Moore M.D. 7.1 mL/hr at 12/17/23 0300 0.05 mcg/kg/min at 12/17/23 0300    MD Alert...ICU Electrolyte Replacement per Pharmacy   Other PHARMACY TO DOSE Danny Dobbs M.D.        enoxaparin (Lovenox) inj 40 mg  40 mg Subcutaneous DAILY AT 1800 Danny Dobbs M.D.   40 mg at 12/16/23 1756    amiodarone (Cordarone) tablet 400 mg  400 mg Oral Q DAY Danny Dobbs M.D.   400 mg at 12/17/23 0508    Followed by    [START ON 12/24/2023] amiodarone (Cordarone) tablet 200 mg  200 mg Oral Q DAY Danny D Dobbs, M.D.        HYDROmorphone (Dilaudid) injection 1 mg  1 mg Intravenous Q4HRS PRN Danny Dobbs M.D.    1 mg at 12/15/23 1753    senna-docusate (Pericolace Or Senokot S) 8.6-50 MG per tablet 2 Tablet  2 Tablet Oral BID Danny Dobbs M.D.   2 Tablet at 12/17/23 0510    And    polyethylene glycol/lytes (Miralax) Packet 1 Packet  1 Packet Oral QDAY PRN Danny Dobbs M.D.   1 Packet at 12/13/23 0518    And    magnesium hydroxide (Milk Of Magnesia) suspension 30 mL  30 mL Oral QDAY PRN Danny Dobbs M.D.   30 mL at 12/13/23 0516    And    bisacodyl (Dulcolax) suppository 10 mg  10 mg Rectal QDAY PRN Danny Dobbs M.D.        oxycodone (Oxy-IR) immediate release tablet 15 mg  15 mg Oral Q6HRS PRN Gil Dalton M.D.   15 mg at 12/17/23 0546    albuterol (Proventil) 2.5mg/0.5ml nebulizer solution 2.5 mg  2.5 mg Nebulization Q4H PRN (RT) Gil Dalton M.D.        dakins 0.125% (1/4 strength) topical soln   Topical BID Calvin Pryor M.D.   10 mL at 12/16/23 0522    omeprazole (PriLOSEC) capsule 20 mg  20 mg Oral DAILY Gil Dalton M.D.   20 mg at 12/17/23 0512    DULoxetine (Cymbalta) capsule 60 mg  60 mg Oral DAILY Torsten Grove M.D.   60 mg at 12/17/23 0509       Fluids    Intake/Output Summary (Last 24 hours) at 12/17/2023 0823  Last data filed at 12/17/2023 0800  Gross per 24 hour   Intake 1539.5 ml   Output 1065 ml   Net 474.5 ml       Laboratory          Recent Labs     12/15/23  0445 12/16/23  0855 12/17/23  0524   SODIUM 137 135 131*   POTASSIUM 4.5 4.6 5.4   CHLORIDE 100 103 100   CO2 30 26 22   BUN 16 14 20   CREATININE 0.63 0.53 0.78   MAGNESIUM 2.1 2.2 2.2   PHOSPHORUS 3.1 2.6 3.2   CALCIUM 7.9* 7.8* 7.7*     Recent Labs     12/15/23  0445 12/16/23  0855 12/17/23  0524   ALTSGPT 81* 55* 44   ASTSGOT 45 26 21   ALKPHOSPHAT 149* 132* 128*   TBILIRUBIN 1.5 1.5 1.3   GLUCOSE 83 81 138*     Recent Labs     12/15/23  0445 12/16/23  0855 12/17/23  0524   WBC 9.3 8.6 9.2   NEUTSPOLYS 74.90* 69.90 87.30*   LYMPHOCYTES 14.60* 15.20* 8.10*   MONOCYTES 8.30 11.50 3.80   EOSINOPHILS 1.70 2.70  0.00   BASOPHILS 0.10 0.20 0.10   ASTSGOT 45 26 21   ALTSGPT 81* 55* 44   ALKPHOSPHAT 149* 132* 128*   TBILIRUBIN 1.5 1.5 1.3     Recent Labs     12/15/23  0445 12/16/23  0855 12/17/23  0524   RBC 3.42* 3.49* 3.43*   HEMOGLOBIN 9.2* 9.3* 9.1*   HEMATOCRIT 30.0* 30.8* 29.8*   PLATELETCT 93* 101* 155*       Imaging  None    Assessment/Plan  * Cardiogenic shock (HCC)  Assessment & Plan  I am titrating norepinephrine to keep systolic blood pressure greater than 90    Sepsis (HCC)- (present on admission)  Assessment & Plan  Developed after admission  Skin and soft tissue source  Continue cefepime  Improved    Pulmonary hypertension (HCC)  Assessment & Plan  WHO group 2 (combined systolic and diastolic heart failure, severe aortic stenosis/regurgitation, moderate to severe mitral regurgitation)  RVSP 66 mmHg  Continue torsemide, 20 mg daily    Elevated LFTs  Assessment & Plan  Due to ischemic hepatopathy from heart failure  Trend liver enzymes and synthetic function  Avoid hepatotoxins  Improving    Dislocation of right subtalar joint  Assessment & Plan  S/P ORIF on 12/6    Acute kidney injury (HCC)- (present on admission)  Assessment & Plan  Monitor renal function and urine output  Avoid nephrotoxins and renal dose medications  Resolved    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)- (present on admission)  Assessment & Plan  LVEF 45%  Grade 3 diastolic dysfunction  Continue spironolactone, 25 mg daily  Continue torsemide, 20 mg daily  Continue dapagliflozin, 10 mg daily  Improved    S/P BKA (below knee amputation), left (HCC)- (present on admission)  Assessment & Plan  Infected left BKA stump  Wound culture with Serratia species  Continue cefepime  S/P I&D on 12/16  Wound care    Chronic pain- (present on admission)  Assessment & Plan  Continue as needed analgesics    Atrial fibrillation with RVR (HCC)- (present on admission)  Assessment & Plan  Decrease amiodarone, 400 mg daily  Optimize potassium and  magnesium  He is in sinus rhythm    Aortic stenosis due to bicuspid aortic valve- (present on admission)  Assessment & Plan  Severe aortic stenosis due to bicuspid aortic valve  Mean valve gradient 48 mmHg  Associated moderate to severe aortic insufficiency  Continue spironolactone, 25 mg daily  Continue torsemide, 20 mg daily    Immunosuppression (HCC)  Assessment & Plan  Hold adalimumab with infected left BKA stump    Hx of gastric bypass- (present on admission)  Assessment & Plan       Rheumatoid arthritis (HCC)- (present on admission)  Assessment & Plan  Hold adalimumab due to infected left BKA stump         VTE:  Lovenox  Ulcer: Not Indicated  Lines: Central Line  Ongoing indication addressed and Rivera Catheter  Ongoing indication addressed    I have performed a physical exam and reviewed and updated ROS and Plan today (12/17/2023). In review of yesterday's note (12/16/2023), there are no changes except as documented above.     I have assessed and reassessed his respiratory status, blood pressure, hemodynamics and cardiovascular status with titration of norepinephrine.  He is at increased risk for worsening cardiovascular system dysfunction.    Discussed patient condition and risk of morbidity and/or mortality with RN, RT, Pharmacy, Charge nurse / hot rounds, and QA team    The patient remains critically ill.  Critical care time = 35 minutes in directly providing and coordinating critical care and extensive data review.  No time overlap and excludes procedures.    You Moore MD  Pulmonary and Critical Care Medicine

## 2023-12-16 NOTE — CARE PLAN
The patient is Watcher - Medium risk of patient condition declining or worsening    Shift Goals  Clinical Goals: stay off norepi, pain control  Patient Goals: pain control, rest  Family Goals: elias    Progress made toward(s) clinical / shift goals:        Problem: Pain - Standard  Goal: Alleviation of pain or a reduction in pain to the patient’s comfort goal  Outcome: Progressing     Problem: Hemodynamics  Goal: Patient's hemodynamics, fluid balance and neurologic status will be stable or improve  Outcome: Progressing

## 2023-12-16 NOTE — ANESTHESIA PREPROCEDURE EVALUATION
Case: [id number] Date/Time: 12/16/23 1608    Procedure: AMPUTATION, BELOW KNEE-REVISION (Left)    Location: [facility] OR 12 / SURGERY [facility]    Surgeons: You Olivares M.D.            Relevant Problems   ANESTHESIA   (positive) MONICA (obstructive sleep apnea)      CARDIAC   (positive) Aortic root dilatation (HCC)   (positive) Aortic stenosis due to bicuspid aortic valve   (positive) Atrial fibrillation with RVR (HCC)   (positive) Left bundle branch block   (positive) Murmur, cardiac   (positive) Pulmonary hypertension (HCC)         (positive) Acute kidney injury (HCC)   (positive) Cardiorenal syndrome      Other   (positive) Rheumatoid arthritis (HCC)       Physical Exam    Airway   Mallampati: III  TM distance: >3 FB  Neck ROM: full       Cardiovascular - normal exam  Rhythm: regular  Rate: normal  (-) murmur     Dental - normal exam           Pulmonary - normal exam  Breath sounds clear to auscultation     Abdominal    Neurological - normal exam         Other findings: CONCLUSIONS  The left ventricle is mildly dilated. The left ventricular ejection   fraction is visually estimated to be 45%. Mildly reduced left   ventricular systolic function.  Normal right ventricular size and systolic function.  Moderately dilated left atrium  Aortic valve not well viauzlied; possibly bicuspid aortic valve.  Severe aortic valve stenosis. Moderate to severe aortic insufficiency.  Moderate to severe mitral regurgitation.  Mild tricuspid regurgitation.   Right atrial pressure is estimated to be 15 mmHg. Estimated right   ventricular systolic pressure is 66 mmHg; severely elevated.  Normal pericardium without effusion.  The ascending aorta is moderately dilated with a diameter of 4.5 cm.  Compared to the prior study on 10/20/23, now severe aortic valve   stenosis, mildly reduced left ventricular systolic function, moderate   to severe mitral and aortic regurgitation, severely estimated RVSP   elevation, moderately dilated ascending  tricia.     LESLIE BADILLO  Exam Date:         12/09/2023                 Anesthesia Plan    ASA 4 (Severe AS, pHTN)   ASA physical status 4 criteria: other (comment)    Plan - general       Airway plan will be LMA          Induction: intravenous    Postoperative Plan: Postoperative administration of opioids is intended.    Pertinent diagnostic labs and testing reviewed    Informed Consent:    Anesthetic plan and risks discussed with patient.

## 2023-12-16 NOTE — PROGRESS NOTES
Critical Care Progress Note    Date of admission  12/6/2023    Chief Complaint  48 y.o. male admitted 12/6/2023 with a dislocated right subtalar joint.  He has a history of a bicuspid aortic valve with aortic stenosis, left BKA, PAF, rheumatoid arthritis, chronic pain, bipolar disorder.    Hospital Course      12/9 admitted to the ICU for cardiogenic shock and organ dysfunction s/p IVF  12/10 improved hemodynamics and organ dysfunction; less vasopressors/inotropic support    12/11 -    titrating dobutamine.  Force diuresis.  Replete potassium.  Continue ceftriaxone and linezolid.  12/12 -    titrating dobutamine.  Titrating norepinephrine.  Force diuresis.  Continue ceftriaxone and linezolid.  12/13 -    dobutamine has been successfully titrated off.  Titrating norepinephrine.  Continue diuresis.  Change ceftriaxone and linezolid to cefepime.  Wound culture with Serratia species.  12/14 -    titrating norepinephrine.  Remove Johnson-Aung catheter.  Continue cefepime.  12/15 -    titrating norepinephrine.  Continue cefepime.  12/16 -    he is off vasopressors.  Continue cefepime.  For I&D today.  Continue torsemide, spironolactone and dapagliflozin.      Interval Problem Update  Reviewed last 24 hour events:      SR  99.1  -510 mL in the last 24  -8107 mL since admit       is feeling better today.  He has no angina, palpitations or syncope.  He has pain in his right leg.  He denies any abdominal pain, nausea or vomiting.  He has no dyspnea or hemoptysis.      Review of Systems  Review of Systems   Constitutional:  Negative for chills, diaphoresis and fever.   HENT:  Negative for sinus pain and sore throat.    Eyes:  Negative for pain, discharge and redness.   Respiratory:  Negative for hemoptysis, shortness of breath and stridor.    Cardiovascular:  Negative for chest pain, palpitations and PND.   Gastrointestinal:  Negative for abdominal pain, blood in stool, nausea and vomiting.   Genitourinary:  Negative for  dysuria, hematuria and urgency.   Musculoskeletal:  Negative for myalgias and neck pain.   Skin:  Negative for rash.   Neurological:  Negative for focal weakness, seizures and headaches.   Endo/Heme/Allergies:  Does not bruise/bleed easily.   Psychiatric/Behavioral:  Negative for hallucinations. The patient is not nervous/anxious.         Vital Signs for last 24 hours   Temp:  [37 °C (98.6 °F)-37.3 °C (99.1 °F)] 37.2 °C (99 °F)  Pulse:  [83-93] 89  Resp:  [12-46] 23  BP: (87-98)/(51-60) 97/56  SpO2:  [92 %-98 %] 96 %    Hemodynamic parameters for last 24 hours       Respiratory Information for the last 24 hours       Physical Exam   Physical Exam  Constitutional:       General: He is not in acute distress.     Appearance: He is not toxic-appearing or diaphoretic.   HENT:      Head: Normocephalic and atraumatic.      Right Ear: External ear normal.      Left Ear: External ear normal.      Nose: Nose normal.      Mouth/Throat:      Pharynx: Oropharynx is clear.   Eyes:      General: No scleral icterus.     Pupils: Pupils are equal, round, and reactive to light.   Cardiovascular:      Comments: Sinus rhythm  Pulmonary:      Effort: Pulmonary effort is normal. No respiratory distress.      Breath sounds: No stridor. Rales (Few crackles) present. No wheezing.   Abdominal:      General: There is no distension.      Palpations: Abdomen is soft.      Tenderness: There is no abdominal tenderness. There is no guarding or rebound.   Musculoskeletal:      Cervical back: Normal range of motion.      Comments: Left BKA.  Left BKA wound dressed.  External fixator on the right lower extremity.   Skin:     General: Skin is warm.   Neurological:      General: No focal deficit present.      Mental Status: He is alert and oriented to person, place, and time.      Cranial Nerves: No cranial nerve deficit.   Psychiatric:         Thought Content: Thought content normal.         Judgment: Judgment normal.         Medications  Current  Facility-Administered Medications   Medication Dose Route Frequency Provider Last Rate Last Admin    torsemide (Demadex) tablet 20 mg  20 mg Oral Q DAY Romaine Clements M.D.        dapagliflozin propanediol (Farxiga) tablet 10 mg  10 mg Oral DAILY Romaine Clements M.D.   10 mg at 12/16/23 0520    cefepime (Maxipime) 2 g in  mL IVPB  2 g Intravenous Q8HRS You Moore M.D.   Stopped at 12/16/23 0547    lactulose 20 GM/30ML solution 30 mL  30 mL Oral TID You Moore M.D.   30 mL at 12/16/23 0520    spironolactone (Aldactone) tablet 25 mg  25 mg Oral Q DAY Romaine Clements M.D.   25 mg at 12/14/23 0632    norepinephrine (Levophed) 8 mg in 250 mL NS infusion (premix)  0-1 mcg/kg/min (Ideal) Intravenous Continuous You Moore M.D.   Stopped at 12/15/23 0438    MD Alert...ICU Electrolyte Replacement per Pharmacy   Other PHARMACY TO DOSE Danny Dobbs M.D.        enoxaparin (Lovenox) inj 40 mg  40 mg Subcutaneous DAILY AT 1800 Danny Dobbs M.D.   40 mg at 12/15/23 1752    amiodarone (Cordarone) tablet 400 mg  400 mg Oral TWICE DAILY Danny Dobbs M.D.   400 mg at 12/16/23 0518    Followed by    [START ON 12/17/2023] amiodarone (Cordarone) tablet 400 mg  400 mg Oral Q DAY Danny Dobbs M.D.        Followed by    [START ON 12/24/2023] amiodarone (Cordarone) tablet 200 mg  200 mg Oral Q DAY Danny Dobbs M.D.        HYDROmorphone (Dilaudid) injection 1 mg  1 mg Intravenous Q4HRS PRN Danny Dobbs M.D.   1 mg at 12/15/23 1753    senna-docusate (Pericolace Or Senokot S) 8.6-50 MG per tablet 2 Tablet  2 Tablet Oral BID Danny Dobbs M.D.   2 Tablet at 12/16/23 0520    And    polyethylene glycol/lytes (Miralax) Packet 1 Packet  1 Packet Oral QDAY PRN Danny Dobbs M.D.   1 Packet at 12/13/23 0518    And    magnesium hydroxide (Milk Of Magnesia) suspension 30 mL  30 mL Oral QDAY PRN Danny Dobbs M.D.   30 mL at 12/13/23 0516    And    bisacodyl  (Dulcolax) suppository 10 mg  10 mg Rectal QDAY PRN Danny Dobbs M.D.        oxycodone (Oxy-IR) immediate release tablet 15 mg  15 mg Oral Q6HRS PRN Gil Dalton M.D.   15 mg at 12/16/23 0532    albuterol (Proventil) 2.5mg/0.5ml nebulizer solution 2.5 mg  2.5 mg Nebulization Q4H PRN (RT) Gil Dalton M.D.        dakins 0.125% (1/4 strength) topical soln   Topical BID Calvin Pryor M.D.   10 mL at 12/16/23 0522    omeprazole (PriLOSEC) capsule 20 mg  20 mg Oral DAILY Gil Dalton M.D.   20 mg at 12/16/23 0518    DULoxetine (Cymbalta) capsule 60 mg  60 mg Oral DAILY Torsten Grove M.D.   60 mg at 12/16/23 0518       Fluids    Intake/Output Summary (Last 24 hours) at 12/16/2023 1152  Last data filed at 12/16/2023 0800  Gross per 24 hour   Intake 520 ml   Output 1325 ml   Net -805 ml       Laboratory          Recent Labs     12/14/23  0330 12/15/23  0445 12/16/23  0855   SODIUM 136 137 135   POTASSIUM 4.3 4.5 4.6   CHLORIDE 98 100 103   CO2 31 30 26   BUN 20 16 14   CREATININE 0.65 0.63 0.53   MAGNESIUM 2.0 2.1 2.2   PHOSPHORUS 2.4* 3.1 2.6   CALCIUM 7.7* 7.9* 7.8*     Recent Labs     12/14/23  0330 12/15/23  0445 12/16/23  0855   ALTSGPT 131* 81* 55*   ASTSGOT 104* 45 26   ALKPHOSPHAT 165* 149* 132*   TBILIRUBIN 1.3 1.5 1.5   GLUCOSE 99 83 81     Recent Labs     12/14/23  0330 12/15/23  0445 12/16/23  0855   WBC 9.9 9.3 8.6   NEUTSPOLYS 76.30* 74.90* 69.90   LYMPHOCYTES 15.80* 14.60* 15.20*   MONOCYTES 7.00 8.30 11.50   EOSINOPHILS 0.50 1.70 2.70   BASOPHILS 0.10 0.10 0.20   ASTSGOT 104* 45 26   ALTSGPT 131* 81* 55*   ALKPHOSPHAT 165* 149* 132*   TBILIRUBIN 1.3 1.5 1.5     Recent Labs     12/14/23  0330 12/15/23  0445 12/16/23  0855   RBC 3.52* 3.42* 3.49*   HEMOGLOBIN 9.4* 9.2* 9.3*   HEMATOCRIT 30.8* 30.0* 30.8*   PLATELETCT 102* 93* 101*       Imaging  None    Assessment/Plan  * Cardiogenic shock (HCC)  Assessment & Plan  He is off dobutamine  He is off norepinephrine    Sepsis (HCC)-  (present on admission)  Assessment & Plan  Developed after admission  Skin and soft tissue source  Continue cefepime  Improved    Pulmonary hypertension (HCC)  Assessment & Plan  WHO group 2 (combined systolic and diastolic heart failure, severe aortic stenosis/regurgitation, moderate to severe mitral regurgitation)  RVSP 66 mmHg  Continue torsemide, 20 mg daily    Elevated LFTs  Assessment & Plan  Due to ischemic hepatopathy from heart failure  Trend liver enzymes and synthetic function  Avoid hepatotoxins  Improving    Dislocation of right subtalar joint  Assessment & Plan  S/P ORIF on 12/6    Acute kidney injury (HCC)- (present on admission)  Assessment & Plan  Monitor renal function and urine output  Avoid nephrotoxins and renal dose medications  Resolved    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)- (present on admission)  Assessment & Plan  LVEF 45%  Grade 3 diastolic dysfunction  Continue spironolactone, 25 mg daily  Continue torsemide, 20 mg daily  Continue dapagliflozin, 10 mg daily  Improved    S/P BKA (below knee amputation), left (HCC)- (present on admission)  Assessment & Plan  Infected left BKA stump  Wound culture with Serratia species  Continue cefepime  Plan for I&D today  Wound care    Chronic pain- (present on admission)  Assessment & Plan  Continue as needed analgesics    Atrial fibrillation with RVR (HCC)- (present on admission)  Assessment & Plan  Continue amiodarone, 400 mg twice daily  Optimize potassium and magnesium  He is in sinus rhythm    Aortic stenosis due to bicuspid aortic valve- (present on admission)  Assessment & Plan  Severe aortic stenosis due to bicuspid aortic valve  Mean valve gradient 48 mmHg  Associated moderate to severe aortic insufficiency  Continue spironolactone, 25 mg daily  Continue torsemide, 20 mg daily    Immunosuppression ([facility])  Assessment & Plan  Hold adalimumab with infected left BKA stump    Hx of gastric bypass- (present on  admission)  Assessment & Plan       Rheumatoid arthritis (HCC)- (present on admission)  Assessment & Plan  Hold adalimumab due to infected left BKA stump         VTE:  Lovenox  Ulcer: Not Indicated  Lines: Central Line  Ongoing indication addressed and Rivera Catheter  Ongoing indication addressed    I have performed a physical exam and reviewed and updated ROS and Plan today (12/16/2023). In review of yesterday's note (12/15/2023), there are no changes except as documented above.     Discussed patient condition and risk of morbidity and/or mortality with RN, RT, Pharmacy, Charge nurse / hot rounds, and QA team    You Moore MD  Pulmonary and Critical Care Medicine

## 2023-12-16 NOTE — PROGRESS NOTES
[facility] Resident Progress Note      Admit Date: 12/6/2023    Resident(s): Aden Carias M.D.   Attending:  RADHA MIRANDA/ Dr. Moore    Patient ID:    Name:  Richard Hubbard   YOB: 1975  Age:  48 y.o.  male   MRN:  [id number]    Hospital Course (carried forward and updated):  Richard Hubbard II is a 48 y.o. male with hx of afib, RA, bipolar, obesity, MONICA, and grade 1 diastolic dysfunction with moderate AS and PAH  who presented for surgery for progressive flatfoot deformity. Pt had open reduction of R subtalar and R talonavicular joint dislocation along with triple arthrodeses and R achilles tendon lengthening 12/6. He developed hypotension with acute renal failure and elevated LFTs. Then started to develop wound breakdown of previous L BKA, started on Ancef 12/8. Then developed afib RVR with hypotension, bedside echo concerning for cardiogenic shock so transferred to ICU.     12/10: Dobutamine off but now back on as CI < 2.2. Pt converted back to afib, rate controlled.   12/11: Lasix decreased to 40mg q8. Lots of ectopy overnight. Wound cx obtained. Stopped hydrocortisone.   12/12: Transaminases stable/downtrending. Changing Lasix from q8 to q12.  12/13: Off dobutamine, titrating levophed. CI 1.8, continue to monitor off dobutamine. Wound cx + for serratia, Cefepime started.    12/14: CI stable off dobutamine. Titrating levophed.  12/15: Off pressors. Plan for OR tomorrow.     Consultants:  Critical Care  Cardiology     Interval Events:    12/16: OR today.    NEURO:   A&O x4  CARDIOVASC:  Off pressors, soft BPs  RESPIRATORY:  Stable on RA  GI/NUTRITION:  Cardiac diet  RENAL/FLUID/LYTES: UOP 1200 in last 24h. Renal function stable.   HEME/ONC:   N/A  INFECTIOUS D:  Cefepime for serratia in wound cx  ENDOCRINE:   N/A    Vitals Range last 24h:  Temp:  [37 °C (98.6 °F)-37.3 °C (99.1 °F)] 37.2 °C (99 °F)  Pulse:  [83-93] 89  Resp:  [12-46] 23  BP: (86-98)/(51-60) 94/53  SpO2:  [92 %-98  %] 96 %      Intake/Output Summary (Last 24 hours) at 12/16/2023 1012  Last data filed at 12/16/2023 0800  Gross per 24 hour   Intake 520 ml   Output 1325 ml   Net -805 ml          Review of Systems   Constitutional:  Negative for chills, fever and malaise/fatigue.   HENT:  Negative for ear pain, hearing loss and sinus pain.    Eyes:  Negative for blurred vision, double vision and pain.   Respiratory:  Negative for cough and shortness of breath.    Cardiovascular:  Negative for chest pain, palpitations and leg swelling.   Gastrointestinal:  Negative for abdominal pain, heartburn, nausea and vomiting.   Genitourinary:  Negative for dysuria, frequency and urgency.   Musculoskeletal:  Positive for joint pain. Negative for falls.   Neurological:  Positive for tingling. Negative for dizziness, weakness and headaches.       PHYSICAL EXAM:  Vitals:    12/16/23 0700 12/16/23 0800 12/16/23 0900 12/16/23 1000   BP: 91/55 96/55 94/53    Pulse: 89 89 89 89   Resp: (!) 30 (!) 29 (!) 30 (!) 23   Temp:       TempSrc:       SpO2: 95% 97% 95% 96%   Weight:       Height:        Body mass index is 28.5 kg/m².    O2 therapy: Pulse Oximetry: 96 %, O2 Delivery Device: None - Room Air    Date 12/16/23 0700 - 12/17/23 0659   Shift 4778-7416 7419-7161 7213-5976 24 Hour Total   INTAKE   Shift Total       OUTPUT   Urine 225   225     Output (mL) (Urethral Catheter) 225   225   Shift Total 225   225   NET -225   -225          Physical Exam  Constitutional:       General: He is not in acute distress.     Appearance: Normal appearance. He is not ill-appearing.      Comments: Sleeping comfortably when first seen   HENT:      Head: Normocephalic and atraumatic.      Nose: Nose normal.      Mouth/Throat:      Mouth: Mucous membranes are moist.      Pharynx: Oropharynx is clear.   Eyes:      General: No scleral icterus.     Extraocular Movements: Extraocular movements intact.      Pupils: Pupils are equal, round, and reactive to light.    Cardiovascular:      Rate and Rhythm: Normal rate. Rhythm irregular.      Pulses: Normal pulses.      Heart sounds: Murmur (3/6 systolic) heard.   Pulmonary:      Effort: Pulmonary effort is normal. No respiratory distress.      Breath sounds: Normal breath sounds. No wheezing.   Abdominal:      General: Abdomen is flat. Bowel sounds are normal. There is no distension.      Palpations: Abdomen is soft.      Tenderness: There is no abdominal tenderness.   Musculoskeletal:         General: No swelling or tenderness.      Comments: L BKA wound wrapped without evidence of discharge. RLE wound dry and clean   Skin:     General: Skin is warm and dry.      Coloration: Skin is not jaundiced or pale.   Neurological:      General: No focal deficit present.      Mental Status: He is alert and oriented to person, place, and time. Mental status is at baseline.             Recent Labs     12/14/23  0330 12/15/23  0445 12/16/23  0855   SODIUM 136 137 135   POTASSIUM 4.3 4.5 4.6   CHLORIDE 98 100 103   CO2 31 30 26   BUN 20 16 14   CREATININE 0.65 0.63 0.53   MAGNESIUM 2.0 2.1 2.2   PHOSPHORUS 2.4* 3.1 2.6   CALCIUM 7.7* 7.9* 7.8*       Recent Labs     12/14/23  0330 12/15/23  0445 12/16/23  0855   ALTSGPT 131* 81* 55*   ASTSGOT 104* 45 26   ALKPHOSPHAT 165* 149* 132*   TBILIRUBIN 1.3 1.5 1.5   GLUCOSE 99 83 81       Recent Labs     12/14/23  0330 12/15/23  0445 12/16/23  0855   RBC 3.52* 3.42* 3.49*   HEMOGLOBIN 9.4* 9.2* 9.3*   HEMATOCRIT 30.8* 30.0* 30.8*   PLATELETCT 102* 93* 101*       Recent Labs     12/14/23  0330 12/15/23  0445 12/16/23  0855   WBC 9.9 9.3 8.6   NEUTSPOLYS 76.30* 74.90* 69.90   LYMPHOCYTES 15.80* 14.60* 15.20*   MONOCYTES 7.00 8.30 11.50   EOSINOPHILS 0.50 1.70 2.70   BASOPHILS 0.10 0.10 0.20   ASTSGOT 104* 45 26   ALTSGPT 131* 81* 55*   ALKPHOSPHAT 165* 149* 132*   TBILIRUBIN 1.3 1.5 1.5         Meds:   torsemide  20 mg      dapagliflozin propanediol  10 mg      cefepime  2 g Stopped (12/16/23 1369)     lactulose  30 mL      spironolactone  25 mg      NORepinephrine  0-1 mcg/kg/min (Ideal) Stopped (12/15/23 0438)    MD Alert...Adult ICU Electrolyte Replacement per Pharmacy        enoxaparin (LOVENOX) injection  40 mg      amiodarone  400 mg      Followed by    [START ON 12/17/2023] amiodarone  400 mg      Followed by    [START ON 12/24/2023] amiodarone  200 mg      HYDROmorphone  1 mg      senna-docusate  2 Tablet      And    polyethylene glycol/lytes  1 Packet      And    magnesium hydroxide  30 mL      And    bisacodyl  10 mg      oxyCODONE immediate-release  15 mg      albuterol  2.5 mg      dakins 0.125% (1/4 strength)        omeprazole  20 mg      DULoxetine  60 mg          Procedures:  12/6: Open reduction of R subtalar and talonavicular joint dislocation, R foot triple arthrodesis, R achilles tendon lengthening, application of multiplanar external fixator RLE  12/9: Central and A lines placed    Imaging:  DX-CHEST-PORTABLE (1 VIEW)   Final Result         1.  Pulmonary edema and/or infiltrates are identified, which are stable since the prior exam.   2.  Trace bilateral pleural effusions   3.  Cardiomegaly      DX-CHEST-PORTABLE (1 VIEW)   Final Result         1.  Coronary edema and/or infiltrates, stable since prior study.   2.  Trace right pleural effusion, stable   3.  Cardiomegaly      US-EXTREMITY VENOUS LOWER BILAT   Final Result      EC-ECHOCARDIOGRAM COMPLETE W/ CONT   Final Result      US-RUQ   Final Result         1. Hepatic steatosis.   2. Otherwise, normal.      DX-CHEST-PORTABLE (1 VIEW)   Final Result         1. Stow-Aung catheter tip is located within a right lower lobe pulmonary arterial branch, repositioning may be indicated.   2. Stable pulmonary edema, cardiomegaly, and small pleural effusions.      DX-CHEST-PORTABLE (1 VIEW)   Final Result      1.  Small right pleural effusion with adjacent airspace disease.   2.  Worsening interstitial infiltrates and/or edema.      DX-CHEST-LIMITED (1  VIEW)   Final Result      1.  Enlarged cardiac silhouette with changes of vascular congestion.      DX-PORTABLE FLUORO > 1 HOUR   Final Result      Portable fluoroscopy utilized for 1 minute 48 seconds.         INTERPRETING LOCATION: [address], [place], [address]      DX-FOOT-COMPLETE 3+ RIGHT   Final Result      Digitized intraoperative radiograph is submitted for review. This examination is not for diagnostic purpose but for guidance during a surgical procedure. Please see the patient's chart for full procedural details.         INTERPRETING LOCATION: [address], [place], [address]          ASSESSEMENT and PLAN:    * Cardiogenic shock (HCC)  Assessment & Plan  Admitted to the ICU 12/09/23 with suspected BiV failure    - Continue to monitor while off pressors  - IV lasix changed to PO torsemide  - Goal SBP > 90    S/P BKA (below knee amputation), left (HCC)- (present on admission)  Assessment & Plan  With infected stump  - Blood cultures NGTD so far  - Wound cx + for serratia  - Started Cefepime 7d course on 12/13  - Plan for I&D of left BKA 12/16  - Continue wound care    Elevated LFTs  Assessment & Plan  Secondary to shock liver    - Improving  - Maintain adequate perfusion  - RUQ US reassuring  - Hepatitis panel negative  - Avoid hepatotoxins    Immunosuppression (HCC)  Assessment & Plan  Holding Humira for now given infected left lower extremity    Dislocation of right subtalar joint  Assessment & Plan  He underwent open reduction of right subtalar joint dislocation, right talonavicular joint dislocation right foot triple arthrodesis, right extensor tendon lengthening and application of multiplanar external fixator right lower extremity on 12/6 by ortho  Per orthopedic surgery, patient will be nonweightbearing to toe-touch weightbearing for transfer on the right lower extremity  - PT/OT as tolerated  - DVTppx with Lovenox  - Pain regimen  - Wound care    Hx of gastric bypass- (present on admission)  Assessment &  Plan  Encourage ongoing weight loss management    Hypokalemia- (present on admission)  Assessment & Plan  Replace as needed    Acute kidney injury (HCC)- (present on admission)  Assessment & Plan  RESOLVED  Secondary to cardiogenic shock/ATN   UOP and renal function improved with diuretics     - Strict I/Os  - Renally dose meds  - Avoid nephrotoxic agents as able  - Continue to monitor    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)- (present on admission)  Assessment & Plan  Last echo from October 20, 2023 with EF 55%, grade 1 diastolic dysfunction with moderate AAS/AI and RVSP of 45 mmHg --> now in biventricular failure as seen on POCUS per Dr. Gonda 12/9    - Repeat echo 12/9 with EF 45% and severe AS  - Can remove [place]  - Hold GDMT other than Dapagliflozin for now  - Cardiology following    Hyponatremia- (present on admission)  Assessment & Plan  Likely 2/2 heart failure  Monitor with above therapies    Chronic pain- (present on admission)  Assessment & Plan  Continue home meds    Atrial fibrillation with RVR (HCC)- (present on admission)  Assessment & Plan  History of A-fib, went into AVR 12/8, converted back to normal sinus rhythm 12/9, now back in afib 12/10    - Continued PO amiodarone  - Echo 12/9 showing EF 45% and severe AS  - Replace electrolytes prn    Sepsis (HCC)- (present on admission)  Assessment & Plan  This is Septic shock Not present on admission  SIRS criteria identified on my evaluation include: Tachycardia, with heart rate greater than 90 BPM, Tachypnea, with respirations greater than 20 per minute, and Leukocytosis, with WBC greater than 12,000  Clinical indicators of end organ dysfunction include Hypotension with systolic blood pressure less than 90 or MAP less than 65  Indicators of septic shock include: Sepsis present and persistent hypotension despite fluid resuscitation   Sources is: Soft tissue  S/p sepsis protocol initiated 12/8  Crystalloid Fluid Administration:  Resuscitation volume of 2 L ordered. Reason that resuscitation volume of less than 30ml/kg was ordered concern for causing harm given CHF  IV antibiotics as appropriate for source of sepsis  Reassessment: I have reassessed the patient's hemodynamic status  Monitor lactic acid, urine output, vital signs closely for adequacy of resuscitation    - Linezolid + ceftriaxone changed to Cefepime  - Previous wound culture from 10/2023 grew staph and strep  - Blood cultures from this admission NGTD   - Wound cx + for serratia  - Goal SBP >90 using vasopressors     Aortic stenosis due to bicuspid aortic valve- (present on admission)  Assessment & Plan  Moderate on last echo in October 2023  - Repeat echo revealing severe AS  - Maintain euvolemia    Rheumatoid arthritis (HCC)- (present on admission)  Assessment & Plan  As needed analgesics  Holding immunosuppressants for now in the setting of infection      DISPO: Continued ICU stay, pt just weaned off pressors. Consider downgrade either in late afternoon or tomorrow.     CODE STATUS: Full Code    Quality Measures:  Feeding: Cardiac diet  Analgesia: PRN oxycodone and dilaudid  Sedation: N/A  Thromboprophylaxis: Lovenox 40mg qd  Head of bed: >30 degrees  Ulcer prophylaxis: N/A  Glycemic control: N/A  Bowel care: bowel regimen in place  Indwelling lines: Central line, Tridell-talat, A line, PIVs, piedra  Deescalation of antibiotics: Cefepime added due to wound cx + eliza Carias M.D. PGY-2

## 2023-12-16 NOTE — PROGRESS NOTES
Previous left BKA complicated by abscess and wound issues.    Plan:  Left BKA revision versus secondary closure of open wound

## 2023-12-16 NOTE — CARE PLAN
The patient is Watcher - Medium risk of patient condition declining or worsening    Shift Goals  Clinical Goals: pain control  Patient Goals: pain control, rest  Family Goals: elias    Progress made toward(s) clinical / shift goals:    Problem: Fall Risk  Goal: Patient will remain free from falls  Outcome: Progressing     Problem: Knowledge Deficit - Standard  Goal: Patient and family/care givers will demonstrate understanding of plan of care, disease process/condition, diagnostic tests and medications  Outcome: Progressing     Problem: Pain - Standard  Goal: Alleviation of pain or a reduction in pain to the patient’s comfort goal  Outcome: Progressing     Problem: Skin Integrity  Goal: Skin integrity is maintained or improved  Outcome: Progressing     Problem: Hemodynamics  Goal: Patient's hemodynamics, fluid balance and neurologic status will be stable or improve  Outcome: Progressing     Problem: Fluid Volume  Goal: Fluid volume balance will be maintained  Outcome: Progressing     Problem: Urinary - Renal Perfusion  Goal: Ability to achieve and maintain adequate renal perfusion and functioning will improve  Outcome: Progressing     Problem: Respiratory  Goal: Patient will achieve/maintain optimum respiratory ventilation and gas exchange  Outcome: Progressing     Problem: Mechanical Ventilation  Goal: Safe management of artificial airway and ventilation  Outcome: Progressing  Goal: Successful weaning off mechanical ventilator, spontaneously maintains adequate gas exchange  Outcome: Progressing  Goal: Patient will be able to express needs and understand communication  Outcome: Progressing     Problem: Physical Regulation  Goal: Diagnostic test results will improve  Outcome: Progressing  Goal: Signs and symptoms of infection will decrease  Outcome: Progressing

## 2023-12-17 NOTE — ANESTHESIA POSTPROCEDURE EVALUATION
Patient: Richard Hubbard II    Procedure Summary       Date: 12/16/23 Room / Location: [facility] 12 / SURGERY [facility]    Anesthesia Start: 1455 Anesthesia Stop: 1609    Procedure: debridement, secondary closure of left revision amputation (Left: Leg Lower) Diagnosis: (Left below the knee amputation wound)    Surgeons: You Olivares M.D. Responsible Provider: Steve Gomes M.D.    Anesthesia Type: general ASA Status: 4            Final Anesthesia Type: general  Last vitals  BP   Blood Pressure: (!) 86/54    Temp   36.3 °C (97.3 °F)    Pulse   83   Resp   20    SpO2   93 %      Anesthesia Post Evaluation    Patient location during evaluation: PACU  Patient participation: complete - patient participated  Level of consciousness: awake and alert  Pain score: 2    Airway patency: patent  Anesthetic complications: no  Cardiovascular status: hemodynamically stable  Respiratory status: acceptable  Hydration status: euvolemic    PONV: none    patient able to participate, but full recovery from regional anesthesia has not occurred and is not expected within the stipulated timeframe for the completion of the evaluation      No notable events documented.     Nurse Pain Score: 6 (NPRS)

## 2023-12-17 NOTE — ANESTHESIA PROCEDURE NOTES
Peripheral Block    Date/Time: 12/16/2023 5:12 PM    Performed by: Steve Gomes M.D.  Authorized by: Steve Gomes M.D.    Patient Location:  Post-op  Start Time:  12/16/2023 5:12 PM  Reason for Block: at surgeon's request and post-op pain management ONLY    patient identified, IV checked, site marked, risks and benefits discussed, surgical consent, monitors and equipment checked, pre-op evaluation and timeout performed    Patient Position:  Supine  Prep: ChloraPrep    Monitoring:  Heart rate, continuous pulse ox and cardiac monitor  Block Region:  Lower Extremity  Lower Extremity - Block Type:  Selective FEMORAL nerve block at the Adductor Canal    Laterality:  Left  Procedures: ultrasound guided  Image captured, interpreted and electronically stored.  Strength:  1 %  Dose:  3 ml  Block Type:  Single-shot  Needle Length:  100mm  Needle Gauge:  21 G  Needle Localization:  Ultrasound guidance  Ultrasound picture in chart  Injection Assessment:  Negative aspiration for heme, no paresthesia on injection, incremental injection and local visualized surrounding nerve on ultrasound  Evidence of intravascular injection: No     US Guided Selective Femoral Nerve Block at Adductor Canal:   US probe placed at mid-thigh level on externally rotated leg and femur identified.  Probe directed medially until Sartorius Muscle (SM), Femoral Artery (FA) and Saphenous Nerve (SN) identified in Adductor Canal (AC).  Needle inserted anterolateral to probe in an in plane approach into a subsartorial perivascular perineural position.  After negative aspiration LA injected with ease and visualized spreading within the AC.

## 2023-12-17 NOTE — OR NURSING
Patient arrived from OR via bed. Report received from Anesthesia and Nursing staff. Vitals stable, no distress noted, orders reviewed and released.

## 2023-12-17 NOTE — OR NURSING
Patient transported to room via bed on 1L n.c. with 250 in O2 tank accompanied by RN & CNA. Patient monitored on transport monitor during transfer. Vitals stable, no distress noted. Family updated, Report given to FAITH Cotto.

## 2023-12-17 NOTE — CARE PLAN
The patient is Watcher - Medium risk of patient condition declining or worsening    Shift Goals  Clinical Goals: Maintain MAP>65  Patient Goals: pain control  Family Goals: JI    Progress made toward(s) clinical / shift goals:    Problem: Pain - Standard  Goal: Alleviation of pain or a reduction in pain to the patient’s comfort goal  Outcome: Progressing     Problem: Hemodynamics  Goal: Patient's hemodynamics, fluid balance and neurologic status will be stable or improve  Outcome: Progressing  Note: Attempted to keep pt off norepinephrine but had to start at low dose of 0.05.     Problem: Respiratory  Goal: Patient will achieve/maintain optimum respiratory ventilation and gas exchange  Outcome: Progressing

## 2023-12-17 NOTE — PROGRESS NOTES
Cardiology Progress Note    Date of Service  12/17/2023    Events/Subjective  Restarted on low-dose norepinephrine following surgery, but otherwise has remained stable  Creatinine stable  Remains in sinus rhythm    Vitals  Temp:  [36.2 °C (97.2 °F)-37.1 °C (98.8 °F)] 37.1 °C (98.8 °F)  Pulse:  [62-99] 83  Resp:  [11-63] 21  BP: ()/(46-59) 89/55  SpO2:  [89 %-100 %] 92 %    Physical Exam  General: Chronically ill-appearing, in no acute distress  Eyes: Extraocular movements intact, anicteric  Neck: Full range of motion, unable to assess JVP with sheath in place  Pulmonary: Normal respiratory effort, remains on supplemental oxygen  Cardiovascular: Regular rate, regular rhythm  Gastrointestinal: Obese, nondistended  Extremities: Left lower extremity BKA, right lower extremity in fixation device  Neurological: More alert, normal speech    Laboratories  Lab Results   Component Value Date/Time    WBC 9.2 12/17/2023 05:24 AM    WBC 8.4 10/19/2010 12:05 PM    RBC 3.43 (L) 12/17/2023 05:24 AM    RBC 4.96 10/19/2010 12:05 PM    HEMOGLOBIN 9.1 (L) 12/17/2023 05:24 AM    HEMATOCRIT 29.8 (L) 12/17/2023 05:24 AM    MCV 86.9 12/17/2023 05:24 AM    MCV 85 10/19/2010 12:05 PM    MCH 26.5 (L) 12/17/2023 05:24 AM    MCH 28.4 10/19/2010 12:05 PM    MCHC 30.5 (L) 12/17/2023 05:24 AM    MPV 11.1 12/17/2023 05:24 AM      Lab Results   Component Value Date/Time    SODIUM 131 (L) 12/17/2023 05:24 AM    POTASSIUM 5.4 12/17/2023 05:24 AM    CHLORIDE 100 12/17/2023 05:24 AM    CO2 22 12/17/2023 05:24 AM    GLUCOSE 138 (H) 12/17/2023 05:24 AM    BUN 20 12/17/2023 05:24 AM    CREATININE 0.78 12/17/2023 05:24 AM    CREATININE 0.95 10/19/2010 12:05 PM    BUNCREATRAT 9 10/19/2010 12:05 PM    GLOMRATE >59 10/19/2010 12:05 PM      Lab Results   Component Value Date/Time    ASTSGOT 21 12/17/2023 05:24 AM    ALTSGPT 44 12/17/2023 05:24 AM     Lab Results   Component Value Date/Time    CHOLSTRLTOT 130 01/28/2021 03:30 PM    LDL 48 01/28/2021 03:30  "PM    HDL 68 01/28/2021 03:30 PM    TRIGLYCERIDE 72 01/28/2021 03:30 PM    TROPONINT 27 (H) 12/08/2023 10:03 PM       No results for input(s): \"NTPROBNP\" in the last 72 hours.    Telemetry  Sinus rhythm    Studies  TTE (12/9/2023)  The left ventricle is mildly dilated. The left ventricular ejection fraction is visually estimated to be 45%.  Mildly reduced left ventricular systolic function.  Normal right ventricular size and systolic function.  Moderately dilated left atrium  Aortic valve not well viauzlied; possibly bicuspid aortic valve.  Severe aortic valve stenosis.  Moderate to severe aortic insufficiency.  Moderate to severe mitral regurgitation.  Mild tricuspid regurgitation.   Right atrial pressure is estimated to be 15 mmHg. Estimated right ventricular systolic pressure is 66 mmHg; severely elevated.  The ascending aorta is moderately dilated with a diameter of 4.5 cm.    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  Please contact me with any questions.    Romaine Clements M.D.   Cardiologist, [facility]

## 2023-12-17 NOTE — OR NURSING
1635 - Dr. Gomes notified patient's pain unrelieved after Oxycodone PO PRN given. Current vitals reviewed with MD. MD to come by and place block.    1649 - MD updated on vital signs. MD gave telephone order for Ephedrine 10 mg IV push one time.    1705 - Dr. Gomes at bedside.     1710 - Time Out performed for anesthesia placed left popliteal and left adductor blocks. Vitals monitored every 5 minutes.    1722 - Procedure completed.

## 2023-12-17 NOTE — CARE PLAN
The patient is Stable - Low risk of patient condition declining or worsening    Shift Goals  Clinical Goals: wean levophed as tolerated  Patient Goals: pain control  Family Goals: elias    Progress made toward(s) clinical / shift goals:    Problem: Fall Risk  Goal: Patient will remain free from falls  Outcome: Progressing     Problem: Knowledge Deficit - Standard  Goal: Patient and family/care givers will demonstrate understanding of plan of care, disease process/condition, diagnostic tests and medications  Outcome: Progressing     Problem: Pain - Standard  Goal: Alleviation of pain or a reduction in pain to the patient’s comfort goal  Outcome: Progressing     Problem: Skin Integrity  Goal: Skin integrity is maintained or improved  Outcome: Progressing     Problem: Hemodynamics  Goal: Patient's hemodynamics, fluid balance and neurologic status will be stable or improve  Outcome: Progressing     Problem: Fluid Volume  Goal: Fluid volume balance will be maintained  Outcome: Progressing     Problem: Urinary - Renal Perfusion  Goal: Ability to achieve and maintain adequate renal perfusion and functioning will improve  Outcome: Progressing     Problem: Respiratory  Goal: Patient will achieve/maintain optimum respiratory ventilation and gas exchange  Outcome: Progressing     Problem: Mechanical Ventilation  Goal: Safe management of artificial airway and ventilation  Outcome: Progressing  Goal: Successful weaning off mechanical ventilator, spontaneously maintains adequate gas exchange  Outcome: Progressing  Goal: Patient will be able to express needs and understand communication  Outcome: Progressing     Problem: Physical Regulation  Goal: Diagnostic test results will improve  Outcome: Progressing  Goal: Signs and symptoms of infection will decrease  Outcome: Progressing

## 2023-12-17 NOTE — PROGRESS NOTES
"    Orthopedic PA Progress Note    Interval changes:  Patient doing well postop  LLE dressings are CDI  NWB LLE  No pending procedures from orthopedic trauma team for LLE  Per Dr. Pryor, NWB RLE     ROS - Patient denies any new issues. Pain well controlled.    BP 95/50   Pulse 82   Temp 37.1 °C (98.8 °F) (Bladder)   Resp (!) 21   Ht 1.803 m (5' 11\")   Wt 88.9 kg (195 lb 15.8 oz)   SpO2 96%     Patient seen and examined  No acute distress  Breathing non labored  RRR  LLE: Dressings CDI.     Recent Labs     12/15/23  0445 12/16/23  0855 12/17/23  0524   WBC 9.3 8.6 9.2   RBC 3.42* 3.49* 3.43*   HEMOGLOBIN 9.2* 9.3* 9.1*   HEMATOCRIT 30.0* 30.8* 29.8*   MCV 87.7 88.3 86.9   MCH 26.9* 26.6* 26.5*   MCHC 30.7* 30.2* 30.5*   RDW 52.6* 52.5* 51.3*   PLATELETCT 93* 101* 155*   MPV 10.3 10.0 11.1       Active Hospital Problems    Diagnosis     Atrial fibrillation with RVR (HCC) [I48.91]      Priority: High    Pulmonary hypertension (HCC) [I27.20]      Priority: Medium    Rheumatoid arthritis (HCC) [M06.9]      Priority: Low     On Humira      Cardiogenic shock (HCC) [R57.0]     Elevated LFTs [R79.89]     Immunosuppression (HCC) [D84.9]     Dislocation of right subtalar joint [S93.314A]     Hx of gastric bypass [Z98.84]     Hypokalemia [E87.6]     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) [I50.41]     Acute kidney injury (HCC) [N17.9]     S/P BKA (below knee amputation), left (HCC) [Z89.512]     Hyponatremia [E87.1]     Chronic pain [G89.29]     Sepsis (HCC) [A41.9]     Aortic stenosis due to bicuspid aortic valve [Q23.0, Q23.1]        Assessment/Plan:  Patient doing well postop  LLE dressings are CDI  NWB LLE  No pending procedures from orthopedic trauma team for LLE  Per Dr. Pryor, NWB RLE     POD#1 S/p  Left below knee amputation revision  Wt bearing status - NWB LLE  Wound care/Drains - Dressings to be changed every other day by nursing. Or PRN for saturation starting POD#2  Future " Procedures - None planned   Lovenox: Start 12/10, Duration-until ambulatory > 150'  Sutures/Staples out- 14-21 days post operatively. Removal will completed by ortho ANNIE's unless transferred.  DVT Prophylaxis outpatient: ASA 81 mg PO BID x4 weeks  PT/OT-initiated  Antibiotics: Cefepime; Perioperative completed  DVT Prophylaxis- TEDS/SCDs/Foot pumps  [place]-not needed per ortho  Case Coordination for Discharge Planning - Disposition per therapy recs.

## 2023-12-17 NOTE — ANESTHESIA PROCEDURE NOTES
Peripheral Block    Date/Time: 12/16/2023 5:12 PM    Performed by: Steve Gomes M.D.  Authorized by: Steve Gomes M.D.    Patient Location:  Post-op  Start Time:  12/16/2023 5:12 PM  Reason for Block: at surgeon's request and post-op pain management ONLY    patient identified, IV checked, site marked, risks and benefits discussed, surgical consent, monitors and equipment checked, pre-op evaluation and timeout performed    Patient Position:  Supine  Prep: ChloraPrep    Monitoring:  Heart rate, continuous pulse ox and cardiac monitor  Block Region:  Lower Extremity  Lower Extremity - Block Type:  SCIATIC nerve block, lateral approach    Laterality:  Left  Procedures: ultrasound guided  Image captured, interpreted and electronically stored.  Strength:  1 %  Dose:  3 ml  Block Type:  Single-shot  Needle Length:  100mm  Needle Gauge:  21 G  Needle Localization:  Ultrasound guidance  Ultrasound picture in chart  Injection Assessment:  Negative aspiration for heme, no paresthesia on injection, incremental injection and local visualized surrounding nerve on ultrasound  Evidence of intravascular injection: No     US Guided Sciatic Nerve Block   US probe placed several cm proximal to popliteal crease on posterior thigh and scanned caudad and cephalad until Sciatic Nerve (SN) identified superficial/lateral to popliteal artery.  Needle inserted lateral to probe in an in plane approach under direct visualization to a perineural position.  After negative aspiration LA injected with ease and visualized surrounding the SN.

## 2023-12-17 NOTE — PROGRESS NOTES
Pt aox4, however, pt confused. Pt having paranoia and delusional thoughts. Per wife, this is not pt baseline. Pt is normally cognitively with it and aox4. Pt blood pressures also remain soft 84/52. Dr. Fernandez notified.

## 2023-12-18 NOTE — OP REPORT
DATE OF OPERATION: 12/16/2023     PREOPERATIVE DIAGNOSIS: Left below-knee amputation chronic wound and exposed tibia    POSTOPERATIVE DIAGNOSIS: Same    PROCEDURE PERFORMED: Reamputation left leg below the knee    SURGEON: You Olivares M.D.     ASSISTANT: None    ANESTHESIA: General    SPECIMEN: Culture and pathology    ESTIMATED BLOOD LOSS: 20 mL    IMPLANTS: None      INDICATIONS: The patient is a 48 y.o. male who presented with a history of a previous below-knee amputation to the left leg.  This was complicated by some wound healing issues of which she now has a chronic wound open to the left BKA stump.  I recommended debridement and possible revision amputation and wound closure.  I discussed the risks and benefits of the procedure which include but are not limited to risks of infection, wound healing complication, neurovascular injury, pain, and the medical risks of anesthesia including MI, stroke, and death.  Alternatives to surgery were also discussed, including non-operative management, which I did not recommend.  The patient was in agreement with the plan to proceed, and the informed consent was signed and documented.  I met with the patient pre-operatively and marked the operative extremity with their agreement.  We proceeded to the operating room.     DESCRIPTION OF PROCEDURE:  Patient was seen in the preoperative holding area on the day of surgery. The operative site was marked with my initials.  he was taken to the operating room and placed supine on the operative table.  Anesthesia was induced.  The operative extremity was prepped and draped in the normal sterile fashion.  Operative pause was conducted and the correct patient, site, side, procedure, and surgeon's initials on extremity were identified.  The wound was thoroughly explored.  The open wound led directly down to the tibial stump.  There is no muscular coverage of this tibial stump at this time.  Based off these findings it was clear that  this was not able to be reclosed primarily.  The wound was extended along the previous incision line until of the distal tibial stump could be fully exposed.  A saw was used to recut the tibia removing a napkin ring of bone.  This allowed for the soft tissues to become compliant enough for adequate coverage.  A sample of the interosseous bone was sent for culture the remaining bone was sent for pathology.  The remaining soft tissues were thoroughly debrided as well.  A rongeur and knife was used to excise any nonviable tissues that were present within this previous open wound and this was debrided down to the bone.  Once I was happy that the remaining soft tissues appeared more normal in their appearance the wound was then irrigated normal saline.  This was then closed in layered fashion with PDS and nylon suture.  Sterile dressings were applied including compressive Ace wrap.  The patient awoke in the operating room and was taken to PACU in stable condition.    POSTOPERATIVE PLAN: Nonweightbearing left lower extremity.  Continue to follow cultures and pathology.  Wound care with dressing changes as needed.  Sutures out between 3 and 4 weeks depending on wound healing.      ____________________________________   You Olivares M.D.

## 2023-12-18 NOTE — PROGRESS NOTES
"    Orthopedic PA Progress Note    Interval changes:  Patient doing well. Wife at bedside  LLE dressings are CDI- dressings changed  NWB LLE  No pending procedures from orthopedic trauma team for LLE  RLE- External fixator in place without evidence of loosening- no signs of infection at pin sites  Per Dr. Pryor, NWB RLE     ROS - Patient denies any new issues. Pain well controlled.    /59   Pulse 85   Temp 37 °C (98.6 °F) (Bladder)   Resp (!) 23   Ht 1.803 m (5' 11\")   Wt 88.4 kg (194 lb 14.2 oz)   SpO2 96%     Patient seen and examined  No acute distress  Breathing non labored  RRR  LLE: Dressings CDI. Incision well-approximated with sutures. No erythema or purulent drainage.     Recent Labs     12/16/23  0855 12/17/23  0524 12/18/23  0531   WBC 8.6 9.2 15.9*   RBC 3.49* 3.43* 3.14*   HEMOGLOBIN 9.3* 9.1* 8.4*   HEMATOCRIT 30.8* 29.8* 27.5*   MCV 88.3 86.9 87.6   MCH 26.6* 26.5* 26.8*   MCHC 30.2* 30.5* 30.5*   RDW 52.5* 51.3* 52.1*   PLATELETCT 101* 155* 139*   MPV 10.0 11.1 11.2         Active Hospital Problems    Diagnosis     Atrial fibrillation with RVR ([facility]) [I48.91]      Priority: High    Pulmonary hypertension (HCC) [I27.20]      Priority: Medium    Rheumatoid arthritis (HCC) [M06.9]      Priority: Low     On Humira      Cardiogenic shock (HCC) [R57.0]     Elevated LFTs [R79.89]     Immunosuppression (HCC) [D84.9]     Dislocation of right subtalar joint [S93.314A]     Hypokalemia [E87.6]     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) [I50.41]     Acute kidney injury (HCC) [N17.9]     S/P BKA (below knee amputation), left (HCC) [Z89.512]     Hyponatremia [E87.1]     Chronic pain [G89.29]     Sepsis (HCC) [A41.9]     Aortic stenosis due to bicuspid aortic valve [Q23.0, Q23.1]        Assessment/Plan:  Patient doing well.  LLE dressings are CDI- dressings changed  NWB LLE  No pending procedures from orthopedic trauma team for LLE  RLE- External fixator in place without evidence " of loosening- no signs of infection at pin sites  Per Dr. Pryor, NWB RLE     POD#2 S/p  Left below knee amputation revision  Wt bearing status - NWB LLE  Wound care/Drains - Dressings to be changed every other day by nursing. Or PRN for saturation starting POD#2  Future Procedures - None planned   Lovenox: Start 12/10, Duration-until ambulatory > 150'  Sutures/Staples out- 14-21 days post operatively. Removal will completed by ortho ANNIE's unless transferred.  DVT Prophylaxis outpatient: ASA 81 mg PO BID x4 weeks  PT/OT-initiated  Antibiotics: Cefepime; Perioperative completed  DVT Prophylaxis- TEDS/SCDs/Foot pumps  [place]-not needed per ortho  Case Coordination for Discharge Planning - Disposition per therapy recs.

## 2023-12-18 NOTE — HOSPITAL COURSE
12/9 admitted to the ICU for cardiogenic shock and organ dysfunction s/p IVF  12/10 improved hemodynamics and organ dysfunction; less vasopressors/inotropic support     12/11 -    titrating dobutamine.  Force diuresis.  Replete potassium.  Continue ceftriaxone and linezolid.  12/12 -    titrating dobutamine.  Titrating norepinephrine.  Force diuresis.  Continue ceftriaxone and linezolid.  12/13 -    dobutamine has been successfully titrated off.  Titrating norepinephrine.  Continue diuresis.  Change ceftriaxone and linezolid to cefepime.  Wound culture with Serratia species.  12/14 -    titrating norepinephrine.  Remove [place] catheter.  Continue cefepime.  12/15 -    titrating norepinephrine.  Continue cefepime.  12/16 -    he is off vasopressors.  Continue cefepime.  For I&D today.  Continue torsemide, spironolactone and dapagliflozin.  12/17 -    S/P I&D of left BKA wound yesterday.  Continue cefepime.  Titrating norepinephrine.  12/18 - Weaning off norepinephrine    12/19 - Off norepinephrine, transfer to [facility].  Pain control

## 2023-12-18 NOTE — PROGRESS NOTES
Cardiology Follow Up Progress Note    Date of Service  12/18/2023    Attending Physician  Umair Donovan M.D.    Chief Complaint   acute systolic congestive heart failure, severe aortic stenosis, atrial fibrillation, bilateral leg surgery and septic shock.    HPI  Richard Hubbard II is a 48 y.o. male admitted 12/6/2023 with above.    Interim Events  No significant changes noted from cardiac standpoint within the past 24 hours.    Review of Systems  Review of Systems   Unable to perform ROS: Other   Cardiovascular:  Negative for chest pain.       Vital signs in last 24 hours  Temp:  [37 °C (98.6 °F)-37.3 °C (99.1 °F)] 37 °C (98.6 °F)  Pulse:  [76-90] 81  Resp:  [11-52] 23  BP: ()/(45-73) 90/59  SpO2:  [89 %-100 %] 97 %    Physical Exam  Physical Exam  Constitutional:       Appearance: Normal appearance. He is well-developed and normal weight. He is ill-appearing.   HENT:      Head: Normocephalic and atraumatic.      Mouth/Throat:      Mouth: Mucous membranes are moist.   Eyes:      Extraocular Movements: Extraocular movements intact.      Conjunctiva/sclera: Conjunctivae normal.   Cardiovascular:      Rate and Rhythm: Normal rate. Rhythm irregular.      Pulses: Normal pulses.      Heart sounds: Murmur heard.   Pulmonary:      Effort: Pulmonary effort is normal.      Breath sounds: Normal breath sounds.   Abdominal:      General: Bowel sounds are normal.      Palpations: Abdomen is soft.   Musculoskeletal:      Cervical back: Normal range of motion and neck supple.   Skin:     General: Skin is warm and dry.   Neurological:      General: No focal deficit present.      Mental Status: He is alert and oriented to person, place, and time. Mental status is at baseline.   Psychiatric:         Mood and Affect: Mood normal.         Behavior: Behavior normal.         Thought Content: Thought content normal.         Judgment: Judgment normal.     Right leg in fixation device. Left BKA.    Lab Review  Lab Results  "  Component Value Date/Time    WBC 15.9 (H) 12/18/2023 05:31 AM    WBC 8.4 10/19/2010 12:05 PM    RBC 3.14 (L) 12/18/2023 05:31 AM    RBC 4.96 10/19/2010 12:05 PM    HEMOGLOBIN 8.4 (L) 12/18/2023 05:31 AM    HEMATOCRIT 27.5 (L) 12/18/2023 05:31 AM    MCV 87.6 12/18/2023 05:31 AM    MCV 85 10/19/2010 12:05 PM    MCH 26.8 (L) 12/18/2023 05:31 AM    MCH 28.4 10/19/2010 12:05 PM    MCHC 30.5 (L) 12/18/2023 05:31 AM    MPV 11.2 12/18/2023 05:31 AM      Lab Results   Component Value Date/Time    SODIUM 131 (L) 12/18/2023 05:31 AM    POTASSIUM 3.9 12/18/2023 05:31 AM    CHLORIDE 102 12/18/2023 05:31 AM    CO2 21 12/18/2023 05:31 AM    GLUCOSE 90 12/18/2023 05:31 AM    BUN 22 12/18/2023 05:31 AM    CREATININE 0.75 12/18/2023 05:31 AM    CREATININE 0.95 10/19/2010 12:05 PM    BUNCREATRAT 9 10/19/2010 12:05 PM    GLOMRATE >59 10/19/2010 12:05 PM      Lab Results   Component Value Date/Time    ASTSGOT 20 12/18/2023 05:31 AM    ALTSGPT 33 12/18/2023 05:31 AM     Lab Results   Component Value Date/Time    CHOLSTRLTOT 130 01/28/2021 03:30 PM    LDL 48 01/28/2021 03:30 PM    HDL 68 01/28/2021 03:30 PM    TRIGLYCERIDE 72 01/28/2021 03:30 PM    TROPONINT 27 (H) 12/08/2023 10:03 PM       No results for input(s): \"NTPROBNP\" in the last 72 hours.  (Above labs reviewed.)       Current Facility-Administered Medications:     acetaminophen (Tylenol) tablet 650 mg, 650 mg, Oral, Q6HRS, Todd Soliz D.O., 650 mg at 12/18/23 1111    midodrine (Proamatine) tablet 10 mg, 10 mg, Oral, TID WITH MEALS, Umair Donovan M.D.    HYDROmorphone (Dilaudid) injection 0.5-1 mg, 0.5-1 mg, Intravenous, Q4HRS PRN, Todd Soliz D.O.    torsemide (Demadex) tablet 20 mg, 20 mg, Oral, Q DAY, Romaine Clements M.D., 20 mg at 12/17/23 0523    dapagliflozin propanediol (Farxiga) tablet 10 mg, 10 mg, Oral, DAILY, Romaine Clements M.D., 10 mg at 12/18/23 0519    cefepime (Maxipime) 2 g in  mL IVPB, 2 g, Intravenous, Q8HRS, You Moore M.D., " Stopped at 12/18/23 0556    lactulose 20 GM/30ML solution 30 mL, 30 mL, Oral, TID, You Moore M.D., 30 mL at 12/18/23 1112    spironolactone (Aldactone) tablet 25 mg, 25 mg, Oral, Q DAY, Romaine Clements M.D., 25 mg at 12/17/23 0509    norepinephrine (Levophed) 8 mg in 250 mL NS infusion (premix), 0-1 mcg/kg/min (Ideal), Intravenous, Continuous, You Moore M.D., Stopped at 12/18/23 1204    MD Alert...ICU Electrolyte Replacement per Pharmacy, , Other, PHARMACY TO DOSE, Danny Dobbs M.D.    enoxaparin (Lovenox) inj 40 mg, 40 mg, Subcutaneous, DAILY AT 1800, Danny Dobbs M.D., 40 mg at 12/17/23 1713    [COMPLETED] amiodarone (Cordarone) tablet 400 mg, 400 mg, Oral, TWICE DAILY, 400 mg at 12/16/23 1756 **FOLLOWED BY** amiodarone (Cordarone) tablet 400 mg, 400 mg, Oral, Q DAY, 400 mg at 12/18/23 0516 **FOLLOWED BY** [START ON 12/24/2023] amiodarone (Cordarone) tablet 200 mg, 200 mg, Oral, Q DAY, Danny Dobbs M.D.    senna-docusate (Pericolace Or Senokot S) 8.6-50 MG per tablet 2 Tablet, 2 Tablet, Oral, BID, 2 Tablet at 12/18/23 0517 **AND** polyethylene glycol/lytes (Miralax) Packet 1 Packet, 1 Packet, Oral, QDAY PRN, 1 Packet at 12/13/23 0518 **AND** magnesium hydroxide (Milk Of Magnesia) suspension 30 mL, 30 mL, Oral, QDAY PRN, 30 mL at 12/13/23 0516 **AND** bisacodyl (Dulcolax) suppository 10 mg, 10 mg, Rectal, QDAY PRN, Danny Dobbs M.D.    oxycodone (Oxy-IR) immediate release tablet 15 mg, 15 mg, Oral, Q6HRS PRN, Gil Dalton M.D., 15 mg at 12/18/23 1145    albuterol (Proventil) 2.5mg/0.5ml nebulizer solution 2.5 mg, 2.5 mg, Nebulization, Q4H PRN (RT), Gil Dalton M.D.    omeprazole (PriLOSEC) capsule 20 mg, 20 mg, Oral, DAILY, Gil Dalton M.D., 20 mg at 12/18/23 0516    DULoxetine (Cymbalta) capsule 60 mg, 60 mg, Oral, DAILY, Torsten Grove M.D., 60 mg at 12/18/23 0518  (Medications reviewed.)    Cardiac Imaging and Procedures Review  CARDIAC  STUDIES/PROCEDURES:    ECHOCARDIOGRAM CONCLUSIONS (12/09/23)  The left ventricle is mildly dilated. The left ventricular ejection   fraction is visually estimated to be 45%. Mildly reduced left   ventricular systolic function.  Normal right ventricular size and systolic function.  Moderately dilated left atrium  Aortic valve not well viauzlied; possibly bicuspid aortic valve.  Severe aortic valve stenosis. Moderate to severe aortic insufficiency.  Moderate to severe mitral regurgitation.  Mild tricuspid regurgitation.   Right atrial pressure is estimated to be 15 mmHg. Estimated right   ventricular systolic pressure is 66 mmHg; severely elevated.  Normal pericardium without effusion.  The ascending aorta is moderately dilated with a diameter of 4.5 cm.  Compared to the prior study on 10/20/23, now severe aortic valve   stenosis, mildly reduced left ventricular systolic function, moderate   to severe mitral and aortic regurgitation, severely estimated RVSP   elevation, moderately dilated ascending aorta.  (study result reviewed)     ECHOCARDIOGRAM CONCLUSIONS (10/20/23)  Prior study on 8/12/2023, compared to the report of the prior study,   there has been no significant change.   Normal left ventricular systolic function.  The left ventricular ejection fraction is visually estimated to be 55%.  Mild mitral regurgitation.  Heavily calcified aortic valve, possibly a bicuspid.  Moderate aortic valve stenosis Vmax is 3.57 m/s.  Aortic valve area calculated from the continuity equation is 1.2 cm2.  Vmax is 3.57 m/s. Transvalvular gradients are - Peak: 50 mmHg,  Mean:   28 mmHg.   Moderate aortic insufficiency.  Mild tricuspid regurgitation.  Estimated right ventricular systolic pressure is 45 mmHg.  (study result reviewed)     EKG performed on (12/12/23) was reviewed: EKG personally interpreted shows atrial fibrillation.     MYOCARDIAL PERFUSION STUDY CONCLUSIONS (11/08/19)  NUCLEAR IMAGING INTERPRETATION  1. Large  inferior wall defect which could be artifact or infarct.  2. No definite reversible defect.  3. Global hypokinesis.  4. EF 35%  ECG INTERPRETATION  Negative stress ECG for ischemia.  (study result reviewed)     Assessment/Plan  Acute systolic congestive heart failure with cardiomyopathy: The overall volume status is adequate.  Severe aortic stenosis: His aortic stenosis is severe. He will need consideration for TAVR when non valvular issues are resolved.   Atrial fibrillation on amiodarone.  Septic shock off of pressor support.    Thank you for allowing me to participate in the care of this patient.  I will continue to follow this patient    Please contact me with any questions.    Chava Naidu M.D.   Cardiologist, [facility]  [phone]

## 2023-12-18 NOTE — PROGRESS NOTES
Critical Care Progress Note    Date of admission  12/6/2023    Chief Complaint  48 y.o. male admitted 12/6/2023 with a dislocated right subtalar joint. He has a history of a bicuspid aortic valve with aortic stenosis, left BKA, PAF, rheumatoid arthritis, chronic pain, bipolar disorder.     Hospital Course  12/9 admitted to the ICU for cardiogenic shock and organ dysfunction s/p IVF  12/10 improved hemodynamics and organ dysfunction; less vasopressors/inotropic support     12/11 -    titrating dobutamine.  Force diuresis.  Replete potassium.  Continue ceftriaxone and linezolid.  12/12 -    titrating dobutamine.  Titrating norepinephrine.  Force diuresis.  Continue ceftriaxone and linezolid.  12/13 -    dobutamine has been successfully titrated off.  Titrating norepinephrine.  Continue diuresis.  Change ceftriaxone and linezolid to cefepime.  Wound culture with Serratia species.  12/14 -    titrating norepinephrine.  Remove [place]-Aung catheter.  Continue cefepime.  12/15 -    titrating norepinephrine.  Continue cefepime.  12/16 -    he is off vasopressors.  Continue cefepime.  For I&D today.  Continue torsemide, spironolactone and dapagliflozin.  12/17 -    S/P I&D of left BKA wound yesterday.  Continue cefepime.  Titrating norepinephrine.    Interval Problem Update  Reviewed last 24 hour events:  Tmax: Afebrile  Diet: Cardiac  Vasopressors:   Norepinephrine ~1.5 mcg/min    Infusions: None    Antibx:   Cefepime 12/13-12/19 (anticipated)    Ceftriaxone 12/9-12/13  Linezolid 12/9-12/13  Augmentin 12/6-12/8    Intake / Output  Urine Output past 24 hours: 2.5 L    Lab Trends  WBC 9 --> 16     Na 135 --> 131      Review of Systems  Review of Systems   Unable to perform ROS: Acuity of condition        Vital Signs for last 24 hours   Temp:  [37 °C (98.6 °F)-37.3 °C (99.1 °F)] 37 °C (98.6 °F)  Pulse:  [76-88] 85  Resp:  [11-45] 23  BP: ()/(45-66) 106/59  SpO2:  [89 %-100 %] 96 %    Hemodynamic parameters for last 24  hours       Respiratory Information for the last 24 hours       Physical Exam   Physical Exam  Vitals and nursing note reviewed. Exam conducted with a chaperone present.   Constitutional:       General: He is not in acute distress.     Appearance: Normal appearance. He is not ill-appearing.   HENT:      Head: Normocephalic.      Mouth/Throat:      Mouth: Mucous membranes are moist.   Eyes:      Extraocular Movements: Extraocular movements intact.   Cardiovascular:      Rate and Rhythm: Normal rate and regular rhythm.      Pulses: Normal pulses.   Pulmonary:      Effort: Pulmonary effort is normal. No respiratory distress.   Abdominal:      General: There is no distension.      Palpations: Abdomen is soft.      Tenderness: There is no abdominal tenderness. There is no guarding or rebound.   Musculoskeletal:      Cervical back: Normal range of motion and neck supple.      Comments: Right LE external fixator in place  Pin sites are ok      Left Lower Extremity: Left leg is amputated below knee.   Skin:     General: Skin is warm and dry.      Capillary Refill: Capillary refill takes less than 2 seconds.   Neurological:      General: No focal deficit present.      Mental Status: He is alert.         Medications  Current Facility-Administered Medications   Medication Dose Route Frequency Provider Last Rate Last Admin    acetaminophen (Tylenol) tablet 650 mg  650 mg Oral Q6HRS Todd Soliz DMIGUEL ANGEL   650 mg at 12/18/23 1111    midodrine (Proamatine) tablet 10 mg  10 mg Oral TID WITH MEALS Umair Donovan M.D.        torsemide (Demadex) tablet 20 mg  20 mg Oral Q DAY Romaine Clements M.D.   20 mg at 12/17/23 0523    dapagliflozin propanediol (Farxiga) tablet 10 mg  10 mg Oral DAILY Romaine Clements M.D.   10 mg at 12/18/23 0519    cefepime (Maxipime) 2 g in  mL IVPB  2 g Intravenous Q8HRS You Moore M.D.   Stopped at 12/18/23 0556    lactulose 20 GM/30ML solution 30 mL  30 mL Oral TID You Thomas  MARCUS Gardner   30 mL at 12/18/23 1112    spironolactone (Aldactone) tablet 25 mg  25 mg Oral Q DAY Romaine Clements M.D.   25 mg at 12/17/23 0509    norepinephrine (Levophed) 8 mg in 250 mL NS infusion (premix)  0-1 mcg/kg/min (Ideal) Intravenous Continuous You Moore M.D. 1.4 mL/hr at 12/18/23 0834 0.01 mcg/kg/min at 12/18/23 0834    MD Alert...ICU Electrolyte Replacement per Pharmacy   Other PHARMACY TO DOSE Danny Dobbs M.D.        enoxaparin (Lovenox) inj 40 mg  40 mg Subcutaneous DAILY AT 1800 Danny Dobbs M.D.   40 mg at 12/17/23 1713    amiodarone (Cordarone) tablet 400 mg  400 mg Oral Q DAY Danny Dobbs M.D.   400 mg at 12/18/23 0516    Followed by    [START ON 12/24/2023] amiodarone (Cordarone) tablet 200 mg  200 mg Oral Q DAY Danny Dobbs M.D.        senna-docusate (Pericolace Or Senokot S) 8.6-50 MG per tablet 2 Tablet  2 Tablet Oral BID Danny Dobbs M.D.   2 Tablet at 12/18/23 0517    And    polyethylene glycol/lytes (Miralax) Packet 1 Packet  1 Packet Oral QDAY PRN Danny Dobbs M.D.   1 Packet at 12/13/23 0518    And    magnesium hydroxide (Milk Of Magnesia) suspension 30 mL  30 mL Oral QDAY PRN Danny Dobbs M.D.   30 mL at 12/13/23 0516    And    bisacodyl (Dulcolax) suppository 10 mg  10 mg Rectal QDAY PRN Danny Dobbs M.D.        oxycodone (Oxy-IR) immediate release tablet 15 mg  15 mg Oral Q6HRS PRN Gil Dalton M.D.   15 mg at 12/18/23 1145    albuterol (Proventil) 2.5mg/0.5ml nebulizer solution 2.5 mg  2.5 mg Nebulization Q4H PRN (RT) Gil Dalton M.D.        omeprazole (PriLOSEC) capsule 20 mg  20 mg Oral DAILY Gil Dalton M.D.   20 mg at 12/18/23 0516    DULoxetine (Cymbalta) capsule 60 mg  60 mg Oral DAILY Torsten Grove M.D.   60 mg at 12/18/23 0518       Fluids    Intake/Output Summary (Last 24 hours) at 12/18/2023 1339  Last data filed at 12/18/2023 1000  Gross per 24 hour   Intake 130.8 ml   Output 815 ml   Net -684.2 ml        Laboratory          Recent Labs     12/16/23  0855 12/17/23  0524 12/18/23  0531   SODIUM 135 131* 131*   POTASSIUM 4.6 5.4 3.9   CHLORIDE 103 100 102   CO2 26 22 21   BUN 14 20 22   CREATININE 0.53 0.78 0.75   MAGNESIUM 2.2 2.2 2.1   PHOSPHORUS 2.6 3.2 2.8   CALCIUM 7.8* 7.7* 7.2*     Recent Labs     12/16/23  0855 12/17/23  0524 12/18/23  0531   ALTSGPT 55* 44 33   ASTSGOT 26 21 20   ALKPHOSPHAT 132* 128* 112*   TBILIRUBIN 1.5 1.3 0.9   GLUCOSE 81 138* 90     Recent Labs     12/16/23  0855 12/17/23  0524 12/18/23  0531   WBC 8.6 9.2 15.9*   NEUTSPOLYS 69.90 87.30* 74.20*   LYMPHOCYTES 15.20* 8.10* 11.20*   MONOCYTES 11.50 3.80 13.90*   EOSINOPHILS 2.70 0.00 0.00   BASOPHILS 0.20 0.10 0.10   ASTSGOT 26 21 20   ALTSGPT 55* 44 33   ALKPHOSPHAT 132* 128* 112*   TBILIRUBIN 1.5 1.3 0.9     Recent Labs     12/16/23  0855 12/17/23  0524 12/18/23  0531   RBC 3.49* 3.43* 3.14*   HEMOGLOBIN 9.3* 9.1* 8.4*   HEMATOCRIT 30.8* 29.8* 27.5*   PLATELETCT 101* 155* 139*       Imaging  X-Ray:  No film today    Assessment/Plan  * Cardiogenic shock (HCC)  Assessment & Plan  I am titrating norepinephrine to keep systolic blood pressure greater than 90    Given low dose, will start mido    Immunosuppression (HCC)  Assessment & Plan  Hold adalimumab with infected left BKA stump    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)- (present on admission)  Assessment & Plan  LVEF 45%  Grade 3 diastolic dysfunction  Continue spironolactone, 25 mg daily  Continue torsemide, 20 mg daily  Continue dapagliflozin, 10 mg daily  Improved    S/P BKA (below knee amputation), left (HCC)- (present on admission)  Assessment & Plan  Infected left BKA stump  Wound culture with Serratia species  Continue cefepime  S/P I&D on 12/16  Wound care    Staples/sutures out 2-3 weeks post op from 12/16 (~Remove week of Jan 1st)    Aortic stenosis due to bicuspid aortic valve- (present on admission)  Assessment & Plan  Severe aortic stenosis due to  bicuspid aortic valve  Mean valve gradient 48 mmHg  Associated moderate to severe aortic insufficiency  Continue spironolactone, 25 mg daily  Continue torsemide, 20 mg daily    Rheumatoid arthritis (HCC)- (present on admission)  Assessment & Plan  Hold adalimumab due to infected left BKA stump    Pulmonary hypertension (HCC)  Assessment & Plan  WHO group 2 (combined systolic and diastolic heart failure, severe aortic stenosis/regurgitation, moderate to severe mitral regurgitation)  RVSP 66 mmHg  Continue torsemide, 20 mg daily    Dislocation of right subtalar joint  Assessment & Plan  S/P ORIF on 12/6    Hyponatremia- (present on admission)  Assessment & Plan  Mildly worsening  Continue inotrope/vasopressor support  Goal euvolemia  trend    Atrial fibrillation with RVR (HCC)- (present on admission)  Assessment & Plan  Decrease amiodarone, 400 mg daily  Optimize potassium and magnesium  He is in sinus rhythm    Hx of gastric bypass  Assessment & Plan       Elevated LFTs  Assessment & Plan  Due to ischemic hepatopathy from heart failure  Trend liver enzymes and synthetic function  Avoid hepatotoxins  Improving    Hypokalemia- (present on admission)  Assessment & Plan  Check BMP daily  Goal is 4  Replete as necessary     Acute kidney injury (HCC)- (present on admission)  Assessment & Plan  Monitor renal function and urine output  Avoid nephrotoxins and renal dose medications  Resolved    Chronic pain- (present on admission)  Assessment & Plan  Continue as needed analgesics    Sepsis (HCC)- (present on admission)  Assessment & Plan  Developed after admission  Skin and soft tissue source  Continue cefepime  Improved         VTE:  Lovenox  Ulcer: PPI  Lines: None    I have performed a physical exam and reviewed and updated ROS and Plan today (12/18/2023). In review of yesterday's note (12/17/2023), there are no changes except as documented above.     Discussed patient condition and risk of morbidity and/or mortality with  RN, RT, Pharmacy, Charge nurse / hot rounds, Patient, and cardiology and orthopedics    The patient remains critically ill.  Critical care time = 52 minutes in directly providing and coordinating critical care and extensive data review.  No time overlap and excludes procedures.

## 2023-12-18 NOTE — CARE PLAN
The patient is Watcher - Medium risk of patient condition declining or worsening    Shift Goals  Clinical Goals: Wean off vasopressors  Patient Goals: JI  Family Goals: JI    Progress made toward(s) clinical / shift goals:    Problem: Pain - Standard  Goal: Alleviation of pain or a reduction in pain to the patient’s comfort goal  Outcome: Progressing  Note: Pt sees pain specialist, pt taking oxycodone 10 mg q6 h.     Problem: Hemodynamics  Goal: Patient's hemodynamics, fluid balance and neurologic status will be stable or improve  Outcome: Progressing  Note: Pt on low dose vasopressors       Patient is not progressing towards the following goals:      Problem: Skin Integrity  Goal: Skin integrity is maintained or improved  Outcome: Not Progressing  Note: Pt has new BKA surgical site, right external fixator site and bottom excoriation

## 2023-12-19 NOTE — DIETARY
Nutrition Update:    Day 13 of admit.  Richard Hubbard II is a 48 y.o. male with admitting DX of Right foot pain [M79.671]  Acute right ankle pain [M25.571]  Dislocation of right subtalar joint, initial encounter [S93.314A]  Sepsis (HCC) [A41.9].  Patient being followed to optimize nutrition.    Current Diet: Cardiac with supplements    Per ADLs pt consistently eating <50%. RD able to visit pt at bedside. Pt reports not having much of an appetite, also dislikes cardiac restriction and ensure products. RD encouraged adequate nutrient intake. Pt to trial magic cups. Sending NR as able to obtain meal preferences.      Problem: Nutritional:  Goal: Achieve adequate nutritional intake  Description: Patient will consume >50% of meals  Outcome: Not met    RD following.

## 2023-12-19 NOTE — PROGRESS NOTES
Critical Care Progress Note    Date of admission  12/6/2023    Chief Complaint  48 y.o. male admitted 12/6/2023 with a dislocated right subtalar joint. He has a history of a bicuspid aortic valve with aortic stenosis, left BKA, PAF, rheumatoid arthritis, chronic pain, bipolar disorder.     Hospital Course  12/9 admitted to the ICU for cardiogenic shock and organ dysfunction s/p IVF  12/10 improved hemodynamics and organ dysfunction; less vasopressors/inotropic support     12/11 -    titrating dobutamine.  Force diuresis.  Replete potassium.  Continue ceftriaxone and linezolid.  12/12 -    titrating dobutamine.  Titrating norepinephrine.  Force diuresis.  Continue ceftriaxone and linezolid.  12/13 -    dobutamine has been successfully titrated off.  Titrating norepinephrine.  Continue diuresis.  Change ceftriaxone and linezolid to cefepime.  Wound culture with Serratia species.  12/14 -    titrating norepinephrine.  Remove [place]-Aung catheter.  Continue cefepime.  12/15 -    titrating norepinephrine.  Continue cefepime.  12/16 -    he is off vasopressors.  Continue cefepime.  For I&D today.  Continue torsemide, spironolactone and dapagliflozin.  12/17 -    S/P I&D of left BKA wound yesterday.  Continue cefepime.  Titrating norepinephrine.  12/18 - Weaning off norepinephrine    12/19 - Off norepinephrine, transfer to [facility].  Pain control    Interval Problem Update  Reviewed last 24 hour events:  Tmax: Afebrile  Diet: Cardiac  Vasopressors: None    Infusions: None    Antibx:   Cefepime 12/13-12/19 (anticipated)    Ceftriaxone 12/9-12/13  Linezolid 12/9-12/13  Augmentin 12/6-12/8    Intake / Output  Urine Output past 24 hours: 2.5 L    Lab Trends  WBC 9 --> 16 --> 12  Hgb 9 --> 8     Na 135 --> 131 --> 132      Review of Systems  Review of Systems   Constitutional:  Negative for chills, fever and malaise/fatigue.   HENT:  Negative for congestion and sore throat.    Eyes: Negative.    Respiratory:  Negative for sputum  production and shortness of breath.    Cardiovascular:  Negative for chest pain and palpitations.   Gastrointestinal:  Negative for abdominal pain, nausea and vomiting.   Genitourinary: Negative.    Musculoskeletal:  Positive for joint pain.   Skin: Negative.    Neurological:  Negative for focal weakness and headaches.   All other systems reviewed and are negative.       Vital Signs for last 24 hours   Temp:  [36.7 °C (98.1 °F)-36.9 °C (98.4 °F)] 36.7 °C (98.1 °F)  Pulse:  [77-90] 82  Resp:  [14-52] 20  BP: ()/(47-59) 92/51  SpO2:  [90 %-100 %] 98 %    Hemodynamic parameters for last 24 hours       Respiratory Information for the last 24 hours       Physical Exam   Physical Exam  Vitals and nursing note reviewed. Exam conducted with a chaperone present.   Constitutional:       General: He is not in acute distress.     Appearance: Normal appearance. He is not ill-appearing.   HENT:      Head: Normocephalic.      Mouth/Throat:      Mouth: Mucous membranes are moist.   Eyes:      Extraocular Movements: Extraocular movements intact.   Cardiovascular:      Rate and Rhythm: Normal rate and regular rhythm.      Pulses: Normal pulses.   Pulmonary:      Effort: Pulmonary effort is normal. No respiratory distress.   Abdominal:      General: There is no distension.      Palpations: Abdomen is soft.      Tenderness: There is no abdominal tenderness. There is no guarding or rebound.   Musculoskeletal:      Cervical back: Normal range of motion and neck supple.      Comments: Right LE external fixator in place  Pin sites are ok      Left Lower Extremity: Left leg is amputated below knee.   Skin:     General: Skin is warm and dry.      Capillary Refill: Capillary refill takes less than 2 seconds.   Neurological:      General: No focal deficit present.      Mental Status: He is alert.         Medications  Current Facility-Administered Medications   Medication Dose Route Frequency Provider Last Rate Last Admin    acetaminophen  (Tylenol) tablet 650 mg  650 mg Oral Q6HRS Todd Soliz D.OMary   650 mg at 12/19/23 0523    midodrine (Proamatine) tablet 10 mg  10 mg Oral TID WITH MEALS Umair Donovan M.D.   10 mg at 12/18/23 1756    HYDROmorphone (Dilaudid) injection 0.5-1 mg  0.5-1 mg Intravenous Q4HRS PRN SIDDHARTH De AndaOMary   1 mg at 12/19/23 0223    torsemide (Demadex) tablet 20 mg  20 mg Oral Q DAY Romaine Clements M.D.   20 mg at 12/17/23 0523    dapagliflozin propanediol (Farxiga) tablet 10 mg  10 mg Oral DAILY Romaine Clements M.D.   10 mg at 12/19/23 0523    cefepime (Maxipime) 2 g in  mL IVPB  2 g Intravenous Q8HRS You Moore M.D.   Stopped at 12/19/23 0555    lactulose 20 GM/30ML solution 30 mL  30 mL Oral TID You Moore M.D.   30 mL at 12/18/23 1755    spironolactone (Aldactone) tablet 25 mg  25 mg Oral Q DAY Romaine Clements M.D.   25 mg at 12/17/23 0509    MD Alert...ICU Electrolyte Replacement per Pharmacy   Other PHARMACY TO DOSE Danny Dobbs M.D.        enoxaparin (Lovenox) inj 40 mg  40 mg Subcutaneous DAILY AT 1800 Danny Dobbs M.D.   40 mg at 12/18/23 1755    amiodarone (Cordarone) tablet 400 mg  400 mg Oral Q DAY Danny Dobbs M.D.   400 mg at 12/19/23 0523    Followed by    [START ON 12/24/2023] amiodarone (Cordarone) tablet 200 mg  200 mg Oral Q DAY Danny Dobbs M.D.        senna-docusate (Pericolace Or Senokot S) 8.6-50 MG per tablet 2 Tablet  2 Tablet Oral BID Danny Dobbs M.D.   2 Tablet at 12/19/23 0524    And    polyethylene glycol/lytes (Miralax) Packet 1 Packet  1 Packet Oral QDAY PRN Danny Dobbs M.D.   1 Packet at 12/13/23 0518    And    magnesium hydroxide (Milk Of Magnesia) suspension 30 mL  30 mL Oral QDAY PRN Danny Dobbs M.D.   30 mL at 12/13/23 0516    And    bisacodyl (Dulcolax) suppository 10 mg  10 mg Rectal QDAY PRN Danny Dobbs M.D.        oxycodone (Oxy-IR) immediate release tablet 15 mg  15 mg Oral Q6HRS PRN Gil Dalton,  M.DMary   15 mg at 12/18/23 2342    albuterol (Proventil) 2.5mg/0.5ml nebulizer solution 2.5 mg  2.5 mg Nebulization Q4H PRN (RT) Gil Dalton M.D.        omeprazole (PriLOSEC) capsule 20 mg  20 mg Oral DAILY BahmanKAILASH GardinerDMary   20 mg at 12/19/23 0524    DULoxetine (Cymbalta) capsule 60 mg  60 mg Oral DAILY Torsten Carloz Grove M.D.   60 mg at 12/19/23 0523       Fluids    Intake/Output Summary (Last 24 hours) at 12/19/2023 0832  Last data filed at 12/19/2023 0600  Gross per 24 hour   Intake 1029.86 ml   Output 655 ml   Net 374.86 ml       Laboratory          Recent Labs     12/17/23  0524 12/18/23  0531 12/19/23  0220   SODIUM 131* 131* 132*   POTASSIUM 5.4 3.9 4.2   CHLORIDE 100 102 101   CO2 22 21 22   BUN 20 22 23*   CREATININE 0.78 0.75 0.79   MAGNESIUM 2.2 2.1 2.1   PHOSPHORUS 3.2 2.8 2.5   CALCIUM 7.7* 7.2* 7.7*     Recent Labs     12/17/23  0524 12/18/23  0531 12/19/23  0220   ALTSGPT 44 33 30   ASTSGOT 21 20 20   ALKPHOSPHAT 128* 112* 111*   TBILIRUBIN 1.3 0.9 0.9   GLUCOSE 138* 90 82     Recent Labs     12/16/23  0855 12/17/23  0524 12/18/23  0531 12/19/23  0220   WBC 8.6 9.2 15.9* 12.1*   NEUTSPOLYS 69.90 87.30* 74.20*  --    LYMPHOCYTES 15.20* 8.10* 11.20*  --    MONOCYTES 11.50 3.80 13.90*  --    EOSINOPHILS 2.70 0.00 0.00  --    BASOPHILS 0.20 0.10 0.10  --    ASTSGOT 26 21 20 20   ALTSGPT 55* 44 33 30   ALKPHOSPHAT 132* 128* 112* 111*   TBILIRUBIN 1.5 1.3 0.9 0.9     Recent Labs     12/17/23  0524 12/18/23  0531 12/19/23  0220   RBC 3.43* 3.14* 3.13*   HEMOGLOBIN 9.1* 8.4* 8.3*   HEMATOCRIT 29.8* 27.5* 27.9*   PLATELETCT 155* 139* 144*       Imaging  X-Ray:  No film today    Assessment/Plan  * Cardiogenic shock (HCC)  Assessment & Plan  Much improved  Off NE  Telemetry    Immunosuppression (HCC)  Assessment & Plan  Hold adalimumab with infected left BKA stump    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)- (present on admission)  Assessment & Plan  LVEF 45%  Grade 3 diastolic  dysfunction  Continue spironolactone, 25 mg daily  Continue torsemide, 20 mg daily  Continue dapagliflozin, 10 mg daily  Improved    S/P BKA (below knee amputation), left (HCC)- (present on admission)  Assessment & Plan  Infected left BKA stump  Wound culture with Serratia species  Continue cefepime  S/P I&D on 12/16  Wound care    Staples/sutures out 2-3 weeks post op from 12/16 (~Remove week of Jan 1st)    Aortic stenosis due to bicuspid aortic valve- (present on admission)  Assessment & Plan  Severe aortic stenosis due to bicuspid aortic valve  Mean valve gradient 48 mmHg  Associated moderate to severe aortic insufficiency  Continue spironolactone, 25 mg daily  Continue torsemide, 20 mg daily    Will likely need TAVR at some point per cardiology    Rheumatoid arthritis (HCC)- (present on admission)  Assessment & Plan  Hold adalimumab due to infected left BKA stump    Pulmonary hypertension (HCC)  Assessment & Plan  WHO group 2 (combined systolic and diastolic heart failure, severe aortic stenosis/regurgitation, moderate to severe mitral regurgitation)  RVSP 66 mmHg  Continue torsemide, 20 mg daily    Dislocation of right subtalar joint  Assessment & Plan  S/P ORIF on 12/6    Hyponatremia- (present on admission)  Assessment & Plan  Goal euvolemia  Trend - mild improvement today    Atrial fibrillation with RVR (HCC)- (present on admission)  Assessment & Plan  Decrease amiodarone, 400 mg daily  Optimize potassium and magnesium  He is in sinus rhythm    Hx of gastric bypass  Assessment & Plan       Elevated LFTs  Assessment & Plan  Due to ischemic hepatopathy from heart failure  Trend liver enzymes and synthetic function  Avoid hepatotoxins  Improving    Hypokalemia- (present on admission)  Assessment & Plan  Check BMP daily  Goal is 4  Replete as necessary     Acute kidney injury (HCC)- (present on admission)  Assessment & Plan  Monitor renal function and urine output  Avoid nephrotoxins and renal dose  medications  Resolved    Chronic pain- (present on admission)  Assessment & Plan  Continue as needed analgesics    Sepsis (HCC)- (present on admission)  Assessment & Plan  Developed after admission  Skin and soft tissue source  Continue cefepime  Improved         VTE:  Lovenox  Ulcer: PPI  Lines: None    I have performed a physical exam and reviewed and updated ROS and Plan today (12/19/2023). In review of yesterday's note (12/18/2023), there are no changes except as documented above.     Discussed patient condition and risk of morbidity and/or mortality with RN, RT, Pharmacy, Charge nurse / hot rounds, Patient, and cardiology and orthopedics    Ok to transfer patient out of ICU today. [facility] will sign off at transfer. Please call with questions.

## 2023-12-19 NOTE — CARE PLAN
The patient is Watcher - Medium risk of patient condition declining or worsening    Shift Goals  Clinical Goals: Stable hemodynamics  Patient Goals: Rest, pain control  Family Goals: JI    Progress made toward(s) clinical / shift goals:    Problem: Pain - Standard  Goal: Alleviation of pain or a reduction in pain to the patient’s comfort goal  Outcome: Progressing  Flowsheets  Taken 12/19/2023 0223 by Vicky Gil RCRISTOFER  Pain Rating Scale (NPRS): 8  Taken 12/19/2023 0042 by Vicky Gil RCRISTOFER  Non Verbal Scale:   Calm   Unlabored Breathing  Taken 12/18/2023 0617 by Melissa Pedersen RCRISTOFER  Critical-Care Pain Observation Score: 0     Problem: Skin Integrity  Goal: Skin integrity is maintained or improved  Outcome: Progressing     Problem: Hemodynamics  Goal: Patient's hemodynamics, fluid balance and neurologic status will be stable or improve  Outcome: Progressing     Problem: Respiratory  Goal: Patient will achieve/maintain optimum respiratory ventilation and gas exchange  Outcome: Progressing     Problem: Physical Regulation  Goal: Diagnostic test results will improve  Outcome: Progressing

## 2023-12-19 NOTE — THERAPY
"Occupational Therapy  Daily Treatment     Patient Name: Richard Hubbard II  Age:  48 y.o., Sex:  male  Medical Record #: [id number]  Today's Date: 12/19/2023     Precautions: Fall Risk, Non Weight Bearing Right Lower Extremity, Non Weight Bearing Left Lower Extremity  Comments: NWB-TTWB R foor for slide transfers only (slide board)    Assessment    Pt seen for OT tx with focus on slide board transfers. Pt now s/p revision to L BKA. Instructed on no WB through residual limb. Pt demos excellent L knee ROM. States he has two prosthetics at home and looks forward to using them again. Educated that he will likely need to replace (or modify) current prostheses due to revision. Pt completed slide board transfer to person  with mod A from OT & PT to ensure safety (first transfer since admission). Instructed on TTWB only through R ex-fix. Pt up for ~45 minutes and able to complete WC level grooming. OT returned to complete transfer BTB with assist from CNA. Slightly more assist due to uphill, however pt demos very good technique. Much improved mentation this session; no delay and improved comprehension. Pt is progressing towards OT goals. Will continue to follow. Recommend physiatry consult.     Plan    Treatment Plan Status: Continue Current Treatment Plan  Type of Treatment: Self Care / Activities of Daily Living, Adaptive Equipment, Cognitive Skill Development, Neuro Re-Education / Balance, Therapeutic Exercises, Therapeutic Activity  Treatment Frequency: 3 Times per Week  Treatment Duration: Until Therapy Goals Met    DC Equipment Recommendations: Unable to determine at this time  Discharge Recommendations: Recommend post-acute placement for additional occupational therapy services prior to discharge home    Subjective    \"That went better than I thought it would.\"     Objective       12/19/23 1146   Vitals   Pulse 93   Patient BP Position Sitting   Blood Pressure 114/67   Pulse Oximetry 95 %   O2 (LPM) 2   O2 " Delivery Device Silicone Nasal Cannula   Cognition    Cognition / Consciousness WDL   Level of Consciousness Alert   Comments much improved cognition & affect   Other Treatments   Other Treatments Provided Co-tx with PT for first session due to complexity of functional transfer   Balance   Sitting Balance (Static) Fair +   Sitting Balance (Dynamic) Fair   Weight Shift Sitting Fair   Comments standing deferred due to NWB BLE   Bed Mobility    Supine to Sit Moderate Assist  (HOB elevated)   Sit to Supine Moderate Assist   Scooting Minimal Assist  (seated)   Rolling Moderate Assist to Lt.;Minimal Assist to Rt.  (support to R ex-fix)   Activities of Daily Living   Grooming Modified Independent  (oral care in WC)   Toileting   ([place], no BM)   Functional Mobility   Sit to Stand Unable to Participate   Bed, Chair, Wheelchair Transfer Moderate Assist   Transfer Method Stand Step  (to/from WC)   Mobility Supine > < EOB, slide board to/from WC   Activity Tolerance   Sitting in Chair 45 min to WC   Sitting Edge of Bed 10 min   Standing unable   Comments up to chair limited by gradually increasing RLE pain   Short Term Goals   Short Term Goal # 1 Pt will complete slide board transfer to/from WC with mod A   Goal Outcome # 1 Progressing as expected   Short Term Goal # 2 Pt will change gown EOB with set-up   Goal Outcome # 2   (NT this session)   Short Term Goal # 3 Pt will use urinal EOB with set-up   Goal Outcome # 3   (NT due to Rivera still in place)   Education Group   Transfers Patient Response Patient;Acceptance;Explanation;Demonstration;Verbal Demonstration;Action Demonstration;Reinforcement Needed  (slide board to/from WC)   Weight Bearing Precautions Patient Response Patient;Acceptance;Explanation;Demonstration;Verbal Demonstration;Action Demonstration;Reinforcement Needed  (TTWB RLE for transfers only)

## 2023-12-19 NOTE — PROGRESS NOTES
"Hospital Medicine Daily Progress Note    Date of Service  12/19/2023    Chief Complaint  Richard Hubbard II is a 48 y.o. male admitted 12/6/2023 for \"an internal fixation of the right subtalar dislocation    Hospital Course    Patient is a 48-year-old male with a past medical history significant for moderate aortic stenosis, bicuspid aortic valve, mildly to moderate pulmonary hypertension with RSVP of 50, history of gastric bypass, history of rheumatoid arthritis, left BKA stump abscess/osteomyelitis complicated with MSSA bacteremia on 10/23.  Patient was supposed to be on Ancef daily 12/1 and continue Augmentin 875/125 1 tablet p.o. twice daily for 8 weeks.  He was admitted on 12/6 by orthopedic surgery and he was noted to have progressive flatfoot on the right side.  He underwent open reduction of right subtalar joint dislocation, right talonavicular joint dislocation right foot triple arthrodesis, right extensor tendon lengthening and application of multiplanar external fixator right lower extremity on 12/6.    Per orthopedic surgery, patient will be nonweightbearing to toe-touch weightbearing for transfer on the right lower extremity.  Will obtain PT/OT, continue DVT prophylaxis and multimodal pain management.    On December 8, 2023 rapid response was called as patient found to have SVT.    Patient found to have A-fib with RVR with prior history of A-fib.  Patient transferred to ICU for multiorgan failure and hypotension on December 9, 2023.  Patient received central venous line placement on December 9, 2023.  He also underwent arterial line placement.    Patient underwent left below-knee amputation for chronic wound and exposed tibia on December 16, 2023 by Dr. Olivares.    On December 19, 2023 intensivist Dr. Donovan requested transfer care to hospitalist service.      Interval Problem Update    12/19/23    I evaluated and examined him at the bedside.  Currently he is on 2 L of oxygen and maintaining oxygen " saturation.  Current white blood cell count is 12.1, hemoglobin of 8.3 and platelet count of 144.  CMP showed sodium of 132, potassium 4.2, BUN of 23 and creatinine 0.79.  He reported generalized pain.  I discussed plan of care with him and answered all his questions.        I have discussed this patient's plan of care and discharge plan at IDT rounds today with Case Management, Nursing, Nursing leadership, and other members of the IDT team.    Consultants/Specialty  cardiology, critical care, and orthopedics    Code Status  Full Code    Disposition  The patient is not medically cleared for discharge to home or a post-acute facility.  Anticipate discharge to: skilled nursing facility    I have placed the appropriate orders for post-discharge needs.    Review of Systems  Review of Systems   Constitutional:  Positive for malaise/fatigue. Negative for chills, fever and weight loss.   HENT:  Negative for hearing loss and tinnitus.    Eyes:  Negative for blurred vision, double vision, photophobia and pain.   Respiratory:  Negative for cough, sputum production and shortness of breath.    Cardiovascular:  Negative for chest pain, palpitations, orthopnea and leg swelling.   Gastrointestinal:  Negative for abdominal pain, constipation, diarrhea, nausea and vomiting.   Genitourinary:  Negative for dysuria, frequency and urgency.   Musculoskeletal:  Positive for joint pain and myalgias. Negative for back pain and neck pain.   Skin:  Negative for rash.   Neurological:  Negative for dizziness, tingling, tremors, sensory change, speech change, focal weakness and headaches.   Psychiatric/Behavioral:  Negative for hallucinations and substance abuse.    All other systems reviewed and are negative.       Physical Exam  Temp:  [36.7 °C (98.1 °F)-36.9 °C (98.4 °F)] 36.7 °C (98.1 °F)  Pulse:  [77-93] 90  Resp:  [14-26] 20  BP: ()/(50-74) 100/57  SpO2:  [90 %-100 %] 94 %    Physical Exam  Vitals reviewed.   Constitutional:        General: He is not in acute distress.  HENT:      Head: Normocephalic and atraumatic. No contusion.      Right Ear: External ear normal.      Left Ear: External ear normal.      Nose: Nose normal.      Mouth/Throat:      Pharynx: No oropharyngeal exudate.   Eyes:      General:         Right eye: No discharge.         Left eye: No discharge.      Pupils: Pupils are equal, round, and reactive to light.   Cardiovascular:      Rate and Rhythm: Normal rate and regular rhythm.      Heart sounds: No murmur heard.     No friction rub. No gallop.   Pulmonary:      Effort: Pulmonary effort is normal.      Breath sounds: No wheezing or rhonchi.   Abdominal:      General: Bowel sounds are normal. There is no distension.      Palpations: Abdomen is soft.      Tenderness: There is no abdominal tenderness. There is no rebound.   Musculoskeletal:         General: No swelling or tenderness. Normal range of motion.      Cervical back: No rigidity. No muscular tenderness.      Comments: Left BKA  External fixator on right   Skin:     General: Skin is warm and dry.      Coloration: Skin is not jaundiced.   Neurological:      General: No focal deficit present.      Mental Status: He is alert.      Cranial Nerves: No cranial nerve deficit.      Sensory: No sensory deficit.   Psychiatric:         Mood and Affect: Mood normal.         Fluids    Intake/Output Summary (Last 24 hours) at 12/19/2023 1525  Last data filed at 12/19/2023 0800  Gross per 24 hour   Intake 920 ml   Output 460 ml   Net 460 ml       Laboratory  Recent Labs     12/17/23  0524 12/18/23  0531 12/19/23  0220   WBC 9.2 15.9* 12.1*   RBC 3.43* 3.14* 3.13*   HEMOGLOBIN 9.1* 8.4* 8.3*   HEMATOCRIT 29.8* 27.5* 27.9*   MCV 86.9 87.6 89.1   MCH 26.5* 26.8* 26.5*   MCHC 30.5* 30.5* 29.7*   RDW 51.3* 52.1* 53.0*   PLATELETCT 155* 139* 144*   MPV 11.1 11.2 11.4     Recent Labs     12/17/23  0524 12/18/23  0531 12/19/23  0220   SODIUM 131* 131* 132*   POTASSIUM 5.4 3.9 4.2    CHLORIDE 100 102 101   CO2 22 21 22   GLUCOSE 138* 90 82   BUN 20 22 23*   CREATININE 0.78 0.75 0.79   CALCIUM 7.7* 7.2* 7.7*                   Imaging  DX-CHEST-PORTABLE (1 VIEW)   Final Result         1.  Pulmonary edema and/or infiltrates are identified, which are stable since the prior exam.   2.  Trace bilateral pleural effusions   3.  Cardiomegaly      DX-CHEST-PORTABLE (1 VIEW)   Final Result         1.  Coronary edema and/or infiltrates, stable since prior study.   2.  Trace right pleural effusion, stable   3.  Cardiomegaly      US-EXTREMITY VENOUS LOWER BILAT   Final Result      EC-ECHOCARDIOGRAM COMPLETE W/ CONT   Final Result      US-RUQ   Final Result         1. Hepatic steatosis.   2. Otherwise, normal.      DX-CHEST-PORTABLE (1 VIEW)   Final Result         1. Greybull-Aung catheter tip is located within a right lower lobe pulmonary arterial branch, repositioning may be indicated.   2. Stable pulmonary edema, cardiomegaly, and small pleural effusions.      DX-CHEST-PORTABLE (1 VIEW)   Final Result      1.  Small right pleural effusion with adjacent airspace disease.   2.  Worsening interstitial infiltrates and/or edema.      DX-CHEST-LIMITED (1 VIEW)   Final Result      1.  Enlarged cardiac silhouette with changes of vascular congestion.      DX-PORTABLE FLUORO > 1 HOUR   Final Result      Portable fluoroscopy utilized for 1 minute 48 seconds.         INTERPRETING LOCATION: [address] [place] [address], [id number]      DX-FOOT-COMPLETE 3+ RIGHT   Final Result      Digitized intraoperative radiograph is submitted for review. This examination is not for diagnostic purpose but for guidance during a surgical procedure. Please see the patient's chart for full procedural details.         INTERPRETING LOCATION: [address] [place] [address], [id number]           Assessment/Plan  * Cardiogenic shock (HCC)  Assessment & Plan  Admitted to the ICU 12/09/23 with suspected BiV failure    - Continue to monitor while off pressors  - IV lasix  changed to PO torsemide  - Goal SBP > 90    Elevated LFTs  Assessment & Plan  Secondary to shock liver    - Improving  - Maintain adequate perfusion  - RUQ US reassuring  - Hepatitis panel negative  - Avoid hepatotoxins  Ordered CMP for tomorrow    Immunosuppression (HCC)  Assessment & Plan  Holding Humira for now given infected left lower extremity    Dislocation of right subtalar joint  Assessment & Plan  He underwent open reduction of right subtalar joint dislocation, right talonavicular joint dislocation right foot triple arthrodesis, right extensor tendon lengthening and application of multiplanar external fixator right lower extremity on 12/6 by ortho  Per orthopedic surgery, patient will be nonweightbearing to toe-touch weightbearing for transfer on the right lower extremity  - PT/OT as tolerated  - DVTppx with Lovenox  - Pain regimen  - Wound care    Hx of gastric bypass  Assessment & Plan  Encourage ongoing weight loss management    Hypokalemia- (present on admission)  Assessment & Plan  Replace as needed    Acute kidney injury (HCC)- (present on admission)  Assessment & Plan  Now RESOLVED  Secondary to cardiogenic shock/ATN   UOP and renal function improved with diuretics     - Strict I/Os  - Renally dose meds  - Avoid nephrotoxic agents as able  - Continue to monitor    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)- (present on admission)  Assessment & Plan  Last echo from October 20, 2023 with EF 55%, grade 1 diastolic dysfunction with moderate AAS/AI and RVSP of 45 mmHg --> now in biventricular failure as seen on POCUS per Dr. Gonda 12/9    - Repeat echo 12/9 with EF 45% and severe AS  - Can remove [place]  Cardiology has been following him   Continue monitor closely on telemetry.    S/P BKA (below knee amputation), left (HCC)- (present on admission)  Assessment & Plan  With infected stump  - Blood cultures NGTD so far  - Wound cx + for serratia  - Started Cefepime 7d course on 12/13  - Plan  for I&D of left BKA 12/16  Continue IV antibiotic and continue wound care    Hyponatremia- (present on admission)  Assessment & Plan  Likely 2/2 heart failure  Monitor with above therapies  I ordered CMP for tomorrow.    Chronic pain- (present on admission)  Assessment & Plan  Continue home meds    Atrial fibrillation with RVR (HCC)- (present on admission)  Assessment & Plan  History of A-fib, went into AVR 12/8, converted back to normal sinus rhythm 12/9, now back in afib 12/10    - Continued PO amiodarone  - Echo 12/9 showing EF 45% and severe AS  Continue to monitor electrolytes and replace as needed.      Sepsis (HCC)- (present on admission)  Assessment & Plan  This is Septic shock Not present on admission  SIRS criteria identified on my evaluation include: Tachycardia, with heart rate greater than 90 BPM, Tachypnea, with respirations greater than 20 per minute, and Leukocytosis, with WBC greater than 12,000  Clinical indicators of end organ dysfunction include Hypotension with systolic blood pressure less than 90 or MAP less than 65  Indicators of septic shock include: Sepsis present and persistent hypotension despite fluid resuscitation   Sources is: Soft tissue  S/p sepsis protocol initiated 12/8  Crystalloid Fluid Administration: Resuscitation volume of 2 L ordered. Reason that resuscitation volume of less than 30ml/kg was ordered concern for causing harm given CHF  IV antibiotics as appropriate for source of sepsis  Reassessment: I have reassessed the patient's hemodynamic status  Monitor lactic acid, urine output, vital signs closely for adequacy of resuscitation    - Linezolid + ceftriaxone changed to Cefepime  - Previous wound culture from 10/2023 grew staph and strep  - Blood cultures from this admission NGTD   I am continuing IV cefepime.      Aortic stenosis due to bicuspid aortic valve- (present on admission)  Assessment & Plan  Moderate on last echo in October 2023  - Repeat echo revealing severe  AS  Cardiology has been following him.    Rheumatoid arthritis (HCC)- (present on admission)  Assessment & Plan  As needed analgesics  Holding immunosuppressants for now in the setting of infection           >51 minutes total time spent in records review, lab/radiology assessment, patient history and assessment, and directing plan of care with high level medical decision making complexity. See orders.    VTE prophylaxis:    enoxaparin ppx      I have performed a physical exam and reviewed and updated ROS and Plan today (12/19/2023). In review of yesterday's note (12/18/2023), there are no changes except as documented above.

## 2023-12-19 NOTE — CARE PLAN
The patient is Stable - Low risk of patient condition declining or worsening    Shift Goals  Clinical Goals: Wean off vasopressors  Patient Goals: JI  Family Goals: JI    Progress made toward(s) clinical / shift goals:    Problem: Knowledge Deficit - Standard  Goal: Patient and family/care givers will demonstrate understanding of plan of care, disease process/condition, diagnostic tests and medications  Description: Target End Date:  1-3 days or as soon as patient condition allows    Document in Patient Education    1.  Patient and family/caregiver oriented to unit, equipment, visitation policy and means for communicating concern  2.  Complete/review Learning Assessment  3.  Assess knowledge level of disease process/condition, treatment plan, diagnostic tests and medications  4.  Explain disease process/condition, treatment plan, diagnostic tests and medications  Outcome: Progressing  Note: Educated on interventions, provided reassurance, explained plan of care       Problem: Pain - Standard  Goal: Alleviation of pain or a reduction in pain to the patient’s comfort goal  Description: Target End Date:  Prior to discharge or change in level of care    Document on Vitals flowsheet    1.  Document pain using the appropriate pain scale per order or unit policy  2.  Educate and implement non-pharmacologic comfort measures (i.e. relaxation, distraction, massage, cold/heat therapy, etc.)  3.  Pain management medications as ordered  4.  Reassess pain after pain med administration per policy  5.  If opiods administered assess patient's response to pain medication is appropriate per POSS sedation scale  6.  Follow pain management plan developed in collaboration with patient and interdisciplinary team (including palliative care or pain specialists if applicable)  Outcome: Progressing  Note: Pain assessed, interventions given as indicated and interventions reassessed.       Problem: Skin Integrity  Goal: Skin integrity is  maintained or improved  Description: Target End Date:  Prior to discharge or change in level of care    Document interventions on Skin Risk/Nam flowsheet groups and corresponding LDA    1.  Assess and monitor skin integrity, appearance and/or temperature  2.  Assess risk factors for impaired skin integrity and/or pressures ulcers  3.  Implement precautions to protect skin integrity in collaboration with interdisciplinary team  4.  Implement pressure ulcer prevention protocol if at risk for skin breakdown  5.  Confirm wound care consult if at risk for skin breakdown  6.  Ensure patient use of pressure relieving devices  (Low air loss bed, waffle overlay, heel protectors, ROHO cushion, etc)  Outcome: Progressing  Note: Skin care completed, all interventions placed, wound consult placed for sacrum       Problem: Hemodynamics  Goal: Patient's hemodynamics, fluid balance and neurologic status will be stable or improve  Description: Target End Date:  Prior to discharge or change in level of care    Document on Assessment and I/O flowsheet templates    1.  Monitor vital signs, pulse oximetry and cardiac monitor per provider order and/or policy  2.  Maintain blood pressure per provider order  3.  Hemodynamic monitoring per provider order  4.  Manage IV fluids and IV infusions  5.  Monitor intake and output  6.  Daily weights per unit policy or provider order  7.  Assess peripheral pulses and capillary refill  8.  Assess color and body temperature  9.  Position patient for maximum circulation/cardiac output  10. Monitor for signs/symptoms of excessive bleeding  11. Assess mental status, restlessness and changes in level of consciousness  12. Monitor temperature and report fever or hypothermia to provider immediately. Consideration of targeted temperature management.  Outcome: Progressing  Note: Midodrine added, levophed weaned off       Patient is not progressing towards the following goals:

## 2023-12-19 NOTE — PROGRESS NOTES
Report given to FAITH Lovett. Receiving RN aware this RN unable to perform full skin assessment of patient yet this shift. Patient transferred to [id number] with all belongings, call light in hand.

## 2023-12-19 NOTE — THERAPY
Physical Therapy   Daily Treatment     Patient Name: Richard Hubbard II  Age:  48 y.o., Sex:  male  Medical Record #: [id number]  Today's Date: 12/19/2023     Precautions  Precautions: Fall Risk;Non Weight Bearing Left Lower Extremity  Comments: nwb-ttwb right foot for slide transfers only (for slide board transfers)    Assessment    Today, pt is able to participate in seated slide board transfer going to his left, using R LE TTWB to assist with transfer from bed to wc. Pt is showing improved cognition, high motivation. See details below. PT to continue.     Plan    Treatment Plan Status: Continue Current Treatment Plan  Type of Treatment: Bed Mobility, Equipment, Neuro Re-Education / Balance, Self Care / Home Evaluation, Therapeutic Activities, Therapeutic Exercise  Treatment Frequency: 4 Times per Week  Treatment Duration: Until Therapy Goals Met    DC Equipment Recommendations: Unable to determine at this time  Discharge Recommendations: Recommend post-acute placement for additional physical therapy services prior to discharge home           Objective       12/19/23 1059   Charge Group   Charges  Yes   PT Therapeutic Activities (Units) 2   Total Time Spent   PT Total Time Yes   PT Therapeutic Activities Time Spent (Mins) 30   PT Total Time Spent (Calculated) 30   Precautions   Precautions Fall Risk;Non Weight Bearing Left Lower Extremity   Comments nwb-ttwb right foot for slide transfers only (for slide board transfers)   Vitals   O2 (LPM) 2   O2 Delivery Device Silicone Nasal Cannula   Pain 0 - 10 Group   Location Leg   Location Orientation Right   Therapist Pain Assessment Prior to Activity;5;During Activity   Cognition    Cognition / Consciousness WDL  (clearer cognitively, no longer delayed responses)   New Learning   (following cues well)   Comments much improved cognition overall.   Active ROM Lower Body    Active ROM Lower Body  X   Comments L LE shows full knee extension to cues, good knee flexion. R  "LE limited by ex fix but able to wiggle toes when cued.   Strength Lower Body   Lower Body Strength  X   Comments needs assist to move R LE across mattress. no assist needed to move L LE   Sensation Lower Body   Lower Extremity Sensation   X   Comments reports that right foot \"feels strange\" with new alignment.   Other Treatments   Other Treatments Provided L LE ace wrapped   Balance   Sitting Balance (Static) Fair   Sitting Balance (Dynamic) Fair -   Weight Shift Sitting Fair   Skilled Intervention Verbal Cuing;Sequencing;Facilitation   Bed Mobility    Supine to Sit Moderate Assist  (HOB up, assist to move R LE to EOB)   Sit to Supine   (up chair)   Scooting Moderate Assist   Rolling Minimal Assist to Rt.;Minimum Assist to Lt.   Skilled Intervention Verbal Cuing;Sequencing;Facilitation   Gait Analysis   Gait Level Of Assist Unable to Participate   Functional Mobility   Sit to Stand Unable to Participate   Bed, Chair, Wheelchair Transfer Moderate Assist   Transfer Method Slide Board   Skilled Intervention Verbal Cuing;Sequencing;Facilitation   How much difficulty does the patient currently have...   Turning over in bed (including adjusting bedclothes, sheets and blankets)? 3   Sitting down on and standing up from a chair with arms (e.g., wheelchair, bedside commode, etc.) 1   Moving from lying on back to sitting on the side of the bed? 2   How much help from another person does the patient currently need...   Moving to and from a bed to a chair (including a wheelchair)? 2   Need to walk in a hospital room? 1   Climbing 3-5 steps with a railing? 1   6 clicks Mobility Score 10   Activity Tolerance   Sitting in Chair 30+   Short Term Goals    Short Term Goal # 1 Pt will perform bed mobility with supervision to progress function in 6 visits.   Goal Outcome # 1 goal not met   Short Term Goal # 2 Pt will transfer bed to wheelchair with supervision to progress function in 6 visits.   Goal Outcome # 2 Goal not met   Short " Term Goal # 3 Pt will propel wheelchair 100ft at mod I level to progress function in 6 visits.   Goal Outcome # 3 Goal not met   Education Group   Transfer Status Patient Response Patient;Acceptance;Explanation;Action Demonstration;Reinforcement Needed   Additional Comments sequencing for transfer bed to    Physical Therapy Treatment Plan   Physical Therapy Treatment Plan Continue Current Treatment Plan   Anticipated Discharge Equipment and Recommendations   DC Equipment Recommendations Unable to determine at this time   Discharge Recommendations Recommend post-acute placement for additional physical therapy services prior to discharge home   Interdisciplinary Plan of Care Collaboration   IDT Collaboration with  Nursing   Patient Position at End of Therapy Seated;Call Light within Reach;Tray Table within Reach;Phone within Reach   Collaboration Comments nsg updated.   Session Information   Date / Session Number  12/19-3 (1/4, 12/20)

## 2023-12-19 NOTE — DISCHARGE PLANNING
[facility] Rehabilitation Transitional Care Coordination    Referral from: Dr Donovan  Insurance Provider on Facesheet: [facility]  Potential Rehab Diagnosis: L BKA revision    Chart review indicates patient may have on going medical management and may have therapy needs to possibly meet inpatient rehab facility criteria with the goal of returning to community.    D/C support: Spouse (Spouse works during the day)     Physiatry consultation forwarded per protocol.     Pt was recently at [facility] 10/27-11/4 on Dr Block's service. Physiatry to consult, TCC will follow.     Thank you for the referral.

## 2023-12-20 PROBLEM — I95.9 HYPOTENSION: Status: ACTIVE | Noted: 2023-01-01

## 2023-12-20 NOTE — PROGRESS NOTES
4 Eyes Skin Assessment Completed by FAITH Lovett and FAITH Clinton.    Head WDL  Ears WDL  Nose WDL  Mouth WDL  Neck WDL  Breast/Chest WDL  Shoulder Blades WDL  Spine WDL  (R) Arm/Elbow/Hand WDL  (L) Arm/Elbow/Hand WDL  Abdomen WDL  Groin WDL  Scrotum WDL  Coccyx/Buttocks Redness and Blanching  (R) Leg Edema Bruising Ext fixator  (L) Leg Bruising BKA  (R) Heel/Foot/Toe Bruising and Ext fixator  (L) Heel/Foot/Toe BKA          Devices In Places Tele Box, Blood Pressure Cuff, Pulse Ox, Rivera, and Nasal Cannula      Interventions In Place Waffle Overlay and Low Air Loss Mattress    Possible Skin Injury No    Pictures Uploaded Into Epic N/A  Wound Consult Placed N/A  RN Wound Prevention Protocol Ordered Yes

## 2023-12-20 NOTE — CARE PLAN
The patient is Watcher - Medium risk of patient condition declining or worsening    Shift Goals  Clinical Goals: hemodynamic stability, pain control.  Patient Goals: pain control  Family Goals: JI    Progress made toward(s) clinical / shift goals:  patient pain is well controlled with pharmacological and non pharmacological means. Patient understands the importance of voicing his feelings and needs. Patient understands the plan of care that has been set by his care team.       Problem: Fall Risk  Goal: Patient will remain free from falls  Outcome: Progressing     Problem: Knowledge Deficit - Standard  Goal: Patient and family/care givers will demonstrate understanding of plan of care, disease process/condition, diagnostic tests and medications  Outcome: Progressing     Problem: Pain - Standard  Goal: Alleviation of pain or a reduction in pain to the patient’s comfort goal  Outcome: Progressing     Problem: Skin Integrity  Goal: Skin integrity is maintained or improved  Outcome: Progressing     Problem: Hemodynamics  Goal: Patient's hemodynamics, fluid balance and neurologic status will be stable or improve  Outcome: Progressing       Patient is not progressing towards the following goals:

## 2023-12-20 NOTE — PROGRESS NOTES
"CARDIOLOGY FOLLOW UP    Impressions:  #. Severe aortic stenosis  #. AF - pers since admit, LVEF decline predates the AF  #. Mod to severe MR  #. LVEF 45% - chronic CMP  #. 4.5 cm TAA  #. Recent MSSA bacteremia, left BKA multiple revisions lately - most recent 12/16, 12/9 with right foot surgery in ext fixator- toe touch  #. Hx of RA  #. Hx gastric bypass,     Recommendations:  AVR - likely via TAVR in next month- ID clearance  - will arrange preTAVR testing, cath, CT    Discussed MV and TAA unable to be addressed with TAVR. Given failing LV and co morbidities doubtful open surgery could be delayed long enough to allow for lower limb healing.     Will start heparin if cleared by ortho, can consider MIA/DCCV, amiodarone load - hold amiodarone for now.     Dapa, spironolactone and torsemide for now.     Hold BB/ACEi/ARB due to hypotension    Will follow    SUBJECTIVE/INTERIM EVENTS:  No cardiac symptoms    EXAM:  BP (!) 89/54   Pulse 83   Temp 36.7 °C (98.1 °F) (Bladder)   Resp 16   Ht 1.803 m (5' 11\")   Wt 86.3 kg (190 lb 4.1 oz)   SpO2 97%   BMI 26.54 kg/m²   GEN: NAD  RESP: CTAB  CVS: irregular, ZEE, late peaking  ABD: Soft, NT/ND  EXT: WWP, no edema. Left BKA, right ext fixator    Studies  Lab Results   Component Value Date/Time    SODIUM 129 (L) 12/20/2023 05:24 AM    POTASSIUM 4.4 12/20/2023 05:24 AM    CHLORIDE 99 12/20/2023 05:24 AM    CO2 22 12/20/2023 05:24 AM    GLUCOSE 100 (H) 12/20/2023 05:24 AM    BUN 20 12/20/2023 05:24 AM    CREATININE 0.77 12/20/2023 05:24 AM    CREATININE 0.95 10/19/2010 12:05 PM    BUNCREATRAT 9 10/19/2010 12:05 PM    GLOMRATE >59 10/19/2010 12:05 PM     Recent Labs     12/18/23  0531 12/19/23  0220 12/20/23 0524   WBC 15.9* 12.1* 14.0*   RBC 3.14* 3.13* 3.39*   HEMOGLOBIN 8.4* 8.3* 9.1*   HEMATOCRIT 27.5* 27.9* 30.1*   MCV 87.6 89.1 88.8   MCH 26.8* 26.5* 26.8*   RDW 52.1* 53.0* 53.0*   PLATELETCT 139* 144* 189   MPV 11.2 11.4 10.6   NEUTSPOLYS 74.20*  --   --    LYMPHOCYTES " 11.20*  --   --    MONOCYTES 13.90*  --   --    EOSINOPHILS 0.00  --   --    BASOPHILS 0.10  --   --

## 2023-12-20 NOTE — CONSULTS
Physical Medicine and Rehabilitation Consultation          Date of initial consultation: 12/20/2023  Consulting provider: Umair Donovan M.D.   Reason for consultation: assess for acute inpatient rehab appropriateness  LOS: 14 Day(s)    Chief complaint: Right ankle deformity correction    HPI: The patient is a 48 y.o. right hand dominant male with a past medical history of left BKA, RA on Humira, CHF, chronic pain, right Charcot foot followed by Dr. Aguilera, history of gastric bypass surgery, recent hospitalization for sepsis due to L BKA infection, A-fib, bipolar disorder, obesity, sleep apnea;  who presented on 12/6/2023 11:59 AM with progressive flatfoot deformity, and taken to the OR by Dr. Calvin Pryor for ORIF right foot triple arthrodeses, right Achilles tendon lengthening, and application of external fixator.  Postoperatively, patient is NWB RLE, toe-touch okay for transfer.  On 12/8 rapid response was called for patient due to SVT.  Found to have A-fib with RVR, transferred to ICU for multiorgan failure and hypotension on 12/9.  Once patient is stabilized from a cardiac perspective, he was taken back to the OR for left BKA chronic wound with exposed tibia and underwent revision of left BKA on 12/16 by Dr. Olivares.  Currently patient has continuing leukocytosis, anemia, hyponatremia.    The patient currently reports nausea, poor appetite, some shortness of breath currently on 2 L nasal cannula oxygen.  Patient has an external fixator on his right leg. The ex-fix is expected to stay on for 8-12 weeks. He also had a revision of LBKA which is wrapped in ACE.     ROS  Pertinent positives are mentioned in the HPI, all others reviewed and are negative.    Social Hx:  1 SH  0 HASEEB  With: Spouse and 2 kids under 10 years old.  Spouse works during the day and the kids go to school.  Patient is alone at times.    THERAPY:  Restrictions: NWB RLE, TTWB OK for slide board transfers.   PT: Functional mobility    12/19: Mod assist slide board transfers    OT: ADLs  12/19: Modified independent for grooming, Rivera dependent for urination  12/14: Mod assist upper body dressing    SLP:   None     IMAGING:  Chest x-ray 12/14/2023  1.  Pulmonary edema and/or infiltrates are identified, which are stable since the prior exam.  2.  Trace bilateral pleural effusions  3.  Cardiomegaly    PROCEDURES:  Calvin Pryor MD 12/6/23  Open reduction of right subtalar joint dislocation  Open reduction of right talonavicular joint dislocation  Right foot triple arthrodesis  Right Achilles tendon lengthening  Application of multiplanar external fixator right lower extremity    You Olivares MD 12/16/2023  Left BKA revision    PMH:  Past Medical History:   Diagnosis Date    ADD (attention deficit disorder)     Alcohol abuse     Allergic rhinitis 05/21/2009    Anemia 09/2019    Anxiety 05/21/2009    Apnea, sleep     Arrhythmia     afib when hospitaized for infection    Back pain 05/21/2009    Bipolar disorder ([facility])     Bleeding ulcer 05/21/2009    Bleeding ulcer 05/21/2009    History of anemia-now on nexium     Bowel habit changes     constipation related to narcotics    Breath shortness     Congenital anomaly of mitral valve     Patient reports he only has 3 heart valves    Congestive heart failure (HCC)     Daytime sleepiness     Dental disorder     Missing teeth    Depression 05/21/2009    Eczema 05/21/2009    GERD (gastroesophageal reflux disease)     Heart burn     on meds    Heart murmur     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    Hemorrhagic disorder (HCC)     hx of bleeding ulcers    History of bleeding ulcers 02/12/2015    Hypertension 05/03/2021 12/7/23:  Patient states he has Never had high blood pressure. pt states well controlled on meds    Insomnia 05/21/2009    Intestinal hernia     Migraine 05/21/2009    Morning headache     Obesity, morbid (HCC) 05/21/2009    Osteomyelitis ([facility]) 10/07/2019    Pain 09/2019    Chronic knee  and back pain    Pneumonia     12/4/23:  Last time was around 1 year ago.    Pubic ramus fracture (HCC) 09/28/2019    Rheumatoid arteritis (HCC) 09/2019    knee, feet right hand    Sleep apnea 05/21/2009    Did not tolerate cpap, does not use it12/4/23:  Resolved with weight loss.    Snoring        PSH:  Past Surgical History:   Procedure Laterality Date    KNEE AMPUTATION BELOW Left 12/16/2023    Procedure: debridement, secondary closure of left revision amputation;  Surgeon: You Olivares M.D.;  Location: [facility];  Service: Orthopedics    ORIF, ANKLE Right 12/6/2023    Procedure: RIGHT SUBTALAR DISLOCATION OPEN REDUCTION INTERNAL FIXATION, TALONAVICULAR OPEN REDUCTION INTERNAL FIXATION, ACHILLES TENDON LENGTHENING, TRIPLE ARTHRODESIS, RIGHT LOWER EXTREMITY APPLICATION OF MULTIPLANAR EXTERNAL FIXATOR;  Surgeon: Calvin Pryor M.D.;  Location: [facility];  Service: Orthopedics    INCISION AND DRAINAGE ORTHOPEDIC Left 10/22/2023    Procedure: REVISION, BELOW KNEE AMPUTATION;  Surgeon: River Colindres M.D.;  Location: [facility];  Service: Orthopedics    IRRIGATION & DEBRIDEMENT GENERAL Left 10/19/2023    Procedure: IRRIGATION AND DEBRIDEMENT, WOUND;  Surgeon: Jay Roe M.D.;  Location: [facility];  Service: Trauma    KNEE AMPUTATION BELOW Left 03/09/2023    Procedure: AMPUTATION, BELOW KNEE;  Surgeon: Wayne Chambers M.D.;  Location: [facility];  Service: Orthopedics    PB TOTAL KNEE ARTHROPLASTY Right 07/22/2020    Procedure: ARTHROPLASTY, KNEE, TOTAL;  Surgeon: Temo Piers M.D.;  Location: [facility];  Service: Orthopedics    TENDON TRANSFER Right 01/22/2020    Procedure: RIGHT DISTAL ULNA RESECTION AND EXTENSOR TENDON TRANSFER;  Surgeon: Marine Rushing M.D.;  Location: [facility];  Service: Orthopedics    OTHER ORTHOPEDIC SURGERY Right 2020    total right knee replacement    IRRIGATION & DEBRIDEMENT ORTHO  "Left 10/09/2019    Procedure: IRRIGATION AND DEBRIDEMENT, WOUND - PELVIC OSTEOMYELITIS;  Surgeon: You Olivares M.D.;  Location: [facility];  Service: Orthopedics    OTHER SURGICAL PROCEDURE  2019    osteomelitis of pelvis cleaned    GASTRIC BYPASS LAPAROSCOPIC  2000    Lucila en y    CHOLECYSTECTOMY  2000    OTHER SURGICAL PROCEDURE      repair of broken penis.  12/4/23:  Patient denies any surgery on penis.       FHX:  Family History   Problem Relation Age of Onset    Hypertension Mother     Arthritis Mother     Depression Mother     Cancer Father         bladder    Schizophrenia Father     Cancer Maternal Grandmother         breast    Heart Disease Maternal Grandmother     Psychiatric Illness Other     Stroke Maternal Aunt     Other Neg Hx         no connective tissue disorders or known aortic disease       Medications:  Current Facility-Administered Medications   Medication Dose    midodrine (Proamatine) tablet 5 mg  5 mg    acetaminophen (Tylenol) tablet 650 mg  650 mg    HYDROmorphone (Dilaudid) injection 0.5-1 mg  0.5-1 mg    torsemide (Demadex) tablet 20 mg  20 mg    dapagliflozin propanediol (Farxiga) tablet 10 mg  10 mg    lactulose 20 GM/30ML solution 30 mL  30 mL    spironolactone (Aldactone) tablet 25 mg  25 mg    MD Alert...ICU Electrolyte Replacement per Pharmacy      enoxaparin (Lovenox) inj 40 mg  40 mg    senna-docusate (Pericolace Or Senokot S) 8.6-50 MG per tablet 2 Tablet  2 Tablet    And    polyethylene glycol/lytes (Miralax) Packet 1 Packet  1 Packet    And    magnesium hydroxide (Milk Of Magnesia) suspension 30 mL  30 mL    And    bisacodyl (Dulcolax) suppository 10 mg  10 mg    oxycodone (Oxy-IR) immediate release tablet 15 mg  15 mg    albuterol (Proventil) 2.5mg/0.5ml nebulizer solution 2.5 mg  2.5 mg    omeprazole (PriLOSEC) capsule 20 mg  20 mg    DULoxetine (Cymbalta) capsule 60 mg  60 mg       Allergies:  Allergies   Allergen Reactions    Nsaids      Bleeding, \"I had a " "bleeding ulcer\"    Diphenhydramine Unspecified     \"I get agitated\"    Mirtazapine Unspecified     I had a hard time waking up, lacking mental focus.     Penicillins      \"Had some dizziness\"  Tolerated Unasyn 10/2019  Tolerated Zosyn 02/2023    Promethazine Unspecified     \"I get very agitated\"         Physical Exam:  Vitals: BP 94/53   Pulse 87   Temp 36.7 °C (98.1 °F) (Bladder)   Resp (!) 27   Ht 1.803 m (5' 11\")   Wt 89.1 kg (196 lb 6.9 oz)   SpO2 95%   Gen: NAD  Head: NC/AT  Eyes/ Nose/ Mouth: PERRLA, moist mucous membranes  Cardio: RRR, good distal perfusion, warm extremities  Pulm: normal respiratory effort, no cyanosis   Abd: Soft NTND, negative borborygmi   Ext: No peripheral edema. No calf tenderness. No clubbing.    Mental status: answers questions appropriately follows commands  Speech: fluent, no aphasia or dysarthria    Motor:      Upper Extremity  Myotome R L   Shoulder flexion C5 5 5   Elbow flexion C5 5 5   Wrist extension C6 5 5   Elbow extension C7 5 5   Finger flexion C8 5 5   Finger abduction T1 5 5     Lower Extremity Myotome R L   Hip flexion L2 2/5 5   Knee extension L3 2/5 5   Ankle dorsiflexion L4 Ex-fix -   Toe extension L5 Ex-fix -   Ankle plantarflexion S1 Ex-fix -       Labs: Reviewed and significant for   Recent Labs     12/18/23  0531 12/19/23  0220 12/20/23  0524   RBC 3.14* 3.13* 3.39*   HEMOGLOBIN 8.4* 8.3* 9.1*   HEMATOCRIT 27.5* 27.9* 30.1*   PLATELETCT 139* 144* 189     Recent Labs     12/18/23  0531 12/19/23  0220 12/20/23  0524   SODIUM 131* 132* 129*   POTASSIUM 3.9 4.2 4.4   CHLORIDE 102 101 99   CO2 21 22 22   GLUCOSE 90 82 100*   BUN 22 23* 20   CREATININE 0.75 0.79 0.77   CALCIUM 7.2* 7.7* 7.9*     Recent Results (from the past 24 hour(s))   Comp Metabolic Panel    Collection Time: 12/20/23  5:24 AM   Result Value Ref Range    Sodium 129 (L) 135 - 145 mmol/L    Potassium 4.4 3.6 - 5.5 mmol/L    Chloride 99 96 - 112 mmol/L    Co2 22 20 - 33 mmol/L    Anion Gap 8.0 " 7.0 - 16.0    Glucose 100 (H) 65 - 99 mg/dL    Bun 20 8 - 22 mg/dL    Creatinine 0.77 0.50 - 1.40 mg/dL    Calcium 7.9 (L) 8.5 - 10.5 mg/dL    Correct Calcium 8.7 8.5 - 10.5 mg/dL    AST(SGOT) 17 12 - 45 U/L    ALT(SGPT) 23 2 - 50 U/L    Alkaline Phosphatase 122 (H) 30 - 99 U/L    Total Bilirubin 1.0 0.1 - 1.5 mg/dL    Albumin 3.0 (L) 3.2 - 4.9 g/dL    Total Protein 6.5 6.0 - 8.2 g/dL    Globulin 3.5 1.9 - 3.5 g/dL    A-G Ratio 0.9 g/dL   MAGNESIUM    Collection Time: 12/20/23  5:24 AM   Result Value Ref Range    Magnesium 2.3 1.5 - 2.5 mg/dL   PHOSPHORUS    Collection Time: 12/20/23  5:24 AM   Result Value Ref Range    Phosphorus 1.8 (L) 2.5 - 4.5 mg/dL   CBC WITHOUT DIFFERENTIAL    Collection Time: 12/20/23  5:24 AM   Result Value Ref Range    WBC 14.0 (H) 4.8 - 10.8 K/uL    RBC 3.39 (L) 4.70 - 6.10 M/uL    Hemoglobin 9.1 (L) 14.0 - 18.0 g/dL    Hematocrit 30.1 (L) 42.0 - 52.0 %    MCV 88.8 81.4 - 97.8 fL    MCH 26.8 (L) 27.0 - 33.0 pg    MCHC 30.2 (L) 32.3 - 36.5 g/dL    RDW 53.0 (H) 35.9 - 50.0 fL    Platelet Count 189 164 - 446 K/uL    MPV 10.6 9.0 - 12.9 fL   ESTIMATED GFR    Collection Time: 12/20/23  5:24 AM   Result Value Ref Range    GFR (CKD-EPI) 111 >60 mL/min/1.73 m 2         ASSESSMENT:  Patient is a 48 y.o. male admitted for left ankle revision by Dr. Rivas Rider MD, and Also Ended up Requiring a Left BKA Revision by Dr. You Olivares     [facility] Code / Diagnosis to Support: 0008.9 - Orthopaedic Disorders: Other Orthopaedic    Rehabilitation: Impaired ADLs and mobility  Patient is a good candidate for inpatient rehab based on needs for PT, OT.  Patient will also benefit from family training.  Patient has a good discharge situation which will be home with spouse.     Barriers to transfer include: Insurance authorization, TCCs to verify disposition, medical clearance and bed availability     All cases are subject to administrative review and recommendations may change    Disposition recommendations:  -  Reasonable candidate to return to [employer]. He has been with [facility] several times this year and has done well with therapy. He now has new WB restrictions and difficulty moving his RLE due to the ex-fix.   - TCC to submit to [facility] insurance for auth   -PMR to follow in the periphery for rehab appropriateness, please reach out with questions or request for medical management    Medical Complexity:    RLE deformity   - S/p corrective surgery by Dr. Pryor on 12/6/23 resulting in ankle spanning ex-fix which is expected to stay on for 8-12 weeks   - NWB RLE, TTWB OK for transfers   - Continue PT/OT while house   - Patient would be appropriate for a short stay of IPR for transfer training and ADLs with new ex-fix     L BKA revision   - chronic wound with exposed bone   - S/P revision by Dr. Olivares 12/16/23  -NWB LLE     Acute respiratory failure   - On 2L NC O2   - Encouraged IS use    Leukocytosis   - WBC 14   - primary team managing     Acute blood loss anemia   - HGB 9   - Primary team managing     Hyponatremia   - Sodium 129  - Maintain Na >130     Nausea, poor appetite   - Adding Marinol 2.5 mg BID for 2 days, then continue with BID PRN    Phantom limb pain   - Cymbalta 60 mg daily     DVT PPX: Lovenox       Thank you for allowing us to participate in the care of this patient.     Patient was seen for >80 minutes on unit/floor of which > 50% of time was spent on counseling and coordination of care regarding the above, including prognosis, risk reduction, benefits of treatment, and options for next stage of care.    Jed Pascual, DO   Physical Medicine and Rehabilitation     Please note that this dictation was created using voice recognition software. I have made every reasonable attempt to correct obvious errors, but there may be errors of grammar and possibly content that I did not discover before finalizing the note.

## 2023-12-20 NOTE — PROGRESS NOTES
Patient up to unit from [facility]. Patient is A&Ox4, VSS, no signs of distress. Tele monitor placed and verified by monitor room. All questions and concerns answered, call light in reach, plan of care ongoing. Agree with previous RN charting.

## 2023-12-20 NOTE — PROGRESS NOTES
"      Orthopaedic Progress Note    Interval changes:  Patient doing well   LLE dressings are CDI  Cleared for DC to rehab by ortho pending medicine clearance    ROS - Patient denies any new issues.  Pain well controlled.    BP (!) 89/49   Pulse 81   Temp 36.7 °C (98.1 °F) (Temporal)   Resp 19   Ht 1.803 m (5' 11\")   Wt 86.3 kg (190 lb 4.1 oz)   SpO2 95%     Patient seen and examined  No acute distress  Breathing non labored  RRR  LLE BKA dressing CDI, no contracture noted.    Recent Labs     12/19/23  0220 12/20/23  0524 12/20/23  1350   WBC 12.1* 14.0* 12.9*   RBC 3.13* 3.39* 3.19*   HEMOGLOBIN 8.3* 9.1* 8.6*   HEMATOCRIT 27.9* 30.1* 27.9*   MCV 89.1 88.8 87.5   MCH 26.5* 26.8* 27.0   MCHC 29.7* 30.2* 30.8*   RDW 53.0* 53.0* 51.4*   PLATELETCT 144* 189 148*   MPV 11.4 10.6 11.1       Active Hospital Problems    Diagnosis     Atrial fibrillation with RVR (HCC) [I48.91]      Priority: High    Pulmonary hypertension (HCC) [I27.20]      Priority: Medium    Rheumatoid arthritis (HCC) [M06.9]      Priority: Low     On Humira      Cardiogenic shock (HCC) [R57.0]     Elevated LFTs [R79.89]     Immunosuppression (HCC) [D84.9]     Dislocation of right subtalar joint [S93.314A]     Hypokalemia [E87.6]     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) [I50.41]     Acute kidney injury (HCC) [N17.9]     S/P BKA (below knee amputation), left (HCC) [Z89.512]     Hyponatremia [E87.1]     Chronic pain [G89.29]     Sepsis (HCC) [A41.9]     Aortic stenosis due to bicuspid aortic valve [Q23.0, Q23.1]        Assessment/Plan:  Patient doing well   LLE dressings are CDI  Cleared for DC to rehab by ortho pending medicine clearance  POD#4 S/P Reamputation left leg below the knee   Wt bearing status - NWB LLE  Wound care/Drains - Dressings to be changed every other day by nursing. Or PRN for saturation starting POD#2  Future Procedures - none planned   Lovenox: Start ok to start per ortho, Duration-until ambulatory > " 150'  Sutures/Staples out- 14-21 days post operatively. Removal will completed by ortho mid levels only.  PT/OT-initiated  Antibiotics: Perioperative completed  DVT Prophylaxis- TEDS/SCDs/Foot pumps  Rivera-not needed per ortho  Case Coordination for Discharge Planning - Disposition per therapy recs.

## 2023-12-20 NOTE — PROGRESS NOTES
"CARDIOLOGY FOLLOW UP    Impressions:  #. Severe aortic stenosis  #. AF - pers since admit  #. Mod to severe MR  #. LVEF 45% - chronic CMP  #. 4.5 cm TAA  #. Recent MSSA bacteremia, left BKA multiple revisions lately - most recent 12/16, 12/9 with right foot surgery in ext fixator- toe touch  #. Hx of RA  #. Hx gastric bypass,     Recommendations:  AVR - likely via TAVR in next month- ID clearance    Discussed MV and TAA unable to be addressed with TAVR. Given failing LV and co morbidities doubtful open surgery could be delayed long enough to allow for lower limb healing.     Amiodarone    Will start anticoag if cleared by ortho, can consider MIA/DCCV, amiodarone load - hold amiodarone for now.     Dapa, spironolactone and torsemide for now.     Hold BB/ACEi/ARB due to valve disease     Will follow    SUBJECTIVE/INTERIM EVENTS:  No cardiac symptoms    EXAM:  /58   Pulse 85   Temp 36.7 °C (98.1 °F) (Bladder)   Resp 20   Ht 1.803 m (5' 11\")   Wt 89.1 kg (196 lb 6.9 oz)   SpO2 95%   BMI 27.40 kg/m²   GEN: NAD  RESP: CTAB  CVS: irregular, No M/R/G  ABD: Soft, NT/ND  EXT: WWP, no edema. Left BKA, right ext fixator    Studies  Lab Results   Component Value Date/Time    SODIUM 132 (L) 12/19/2023 02:20 AM    POTASSIUM 4.2 12/19/2023 02:20 AM    CHLORIDE 101 12/19/2023 02:20 AM    CO2 22 12/19/2023 02:20 AM    GLUCOSE 82 12/19/2023 02:20 AM    BUN 23 (H) 12/19/2023 02:20 AM    CREATININE 0.79 12/19/2023 02:20 AM    CREATININE 0.95 10/19/2010 12:05 PM    BUNCREATRAT 9 10/19/2010 12:05 PM    GLOMRATE >59 10/19/2010 12:05 PM     Recent Labs     12/17/23  0524 12/18/23  0531 12/19/23  0220   WBC 9.2 15.9* 12.1*   RBC 3.43* 3.14* 3.13*   HEMOGLOBIN 9.1* 8.4* 8.3*   HEMATOCRIT 29.8* 27.5* 27.9*   MCV 86.9 87.6 89.1   MCH 26.5* 26.8* 26.5*   RDW 51.3* 52.1* 53.0*   PLATELETCT 155* 139* 144*   MPV 11.1 11.2 11.4   NEUTSPOLYS 87.30* 74.20*  --    LYMPHOCYTES 8.10* 11.20*  --    MONOCYTES 3.80 13.90*  --    EOSINOPHILS " 0.00 0.00  --    BASOPHILS 0.10 0.10  --          Medical records reviewed for this encounter    For this encounter I directly reviewed/Interpreted Echo images severe AS    Encounter addressed life threatening valve disease

## 2023-12-20 NOTE — PROGRESS NOTES
4 Eyes Skin Assessment Completed by FAITH Hanley and FAITH Craig.    Head WDL  Ears WDL  Nose WDL  Mouth WDL  Neck WDL  Breast/Chest WDL  Shoulder Blades WDL  Spine WDL  (R) Arm/Elbow/Hand WDL  (L) Arm/Elbow/Hand WDL  Abdomen WDL  Groin Redness  Scrotum/Coccyx/Buttocks Redness and Blanching, partial thickness wound to sacrum  (R) Leg Right foot incision with external fixator  (L) Leg Full thickness would to left BKA stump  (R) Heel/Foot/Toe bruising and fixator  (L) Heel/Foot/Toe bka      -Wound team already following    Devices In Places Tele Box, Blood Pressure Cuff, Pulse Ox, and Nasal Cannula      Interventions In Place NC W/Ear Foams, Waffle Overlay, TAP System, Pillows, Q2 Turns, and Pressure Redistribution Mattress    Possible Skin Injury Yes    Pictures Uploaded Into Epic Yes  Wound Consult Placed Yes  RN Wound Prevention Protocol Ordered Yes

## 2023-12-20 NOTE — WOUND TEAM
[facility]  Inpatient Services  Wound and Skin Care Follow-up    Admission Date: 12/6/2023     Last order of IP CONSULT TO WOUND CARE was found on 10/27/2023 from Hospital Encounter on 10/26/2023     HPI, PMH, SH: Reviewed    Past Surgical History:   Procedure Laterality Date    KNEE AMPUTATION BELOW Left 12/16/2023    Procedure: debridement, secondary closure of left revision amputation;  Surgeon: You Olivares M.D.;  Location: [facility];  Service: Orthopedics    ORIF, ANKLE Right 12/6/2023    Procedure: RIGHT SUBTALAR DISLOCATION OPEN REDUCTION INTERNAL FIXATION, TALONAVICULAR OPEN REDUCTION INTERNAL FIXATION, ACHILLES TENDON LENGTHENING, TRIPLE ARTHRODESIS, RIGHT LOWER EXTREMITY APPLICATION OF MULTIPLANAR EXTERNAL FIXATOR;  Surgeon: Calvin Pryor M.D.;  Location: [facility];  Service: Orthopedics    INCISION AND DRAINAGE ORTHOPEDIC Left 10/22/2023    Procedure: REVISION, BELOW KNEE AMPUTATION;  Surgeon: River Colindres M.D.;  Location: [facility];  Service: Orthopedics    IRRIGATION & DEBRIDEMENT GENERAL Left 10/19/2023    Procedure: IRRIGATION AND DEBRIDEMENT, WOUND;  Surgeon: Jay Roe M.D.;  Location: [facility];  Service: Trauma    KNEE AMPUTATION BELOW Left 03/09/2023    Procedure: AMPUTATION, BELOW KNEE;  Surgeon: Wayne Chambers M.D.;  Location: [facility];  Service: Orthopedics    PB TOTAL KNEE ARTHROPLASTY Right 07/22/2020    Procedure: ARTHROPLASTY, KNEE, TOTAL;  Surgeon: Temo Pires M.D.;  Location: [facility];  Service: Orthopedics    TENDON TRANSFER Right 01/22/2020    Procedure: RIGHT DISTAL ULNA RESECTION AND EXTENSOR TENDON TRANSFER;  Surgeon: Marine Rushing M.D.;  Location: [facility];  Service: Orthopedics    OTHER ORTHOPEDIC SURGERY Right 2020    total right knee replacement    IRRIGATION & DEBRIDEMENT ORTHO Left 10/09/2019    Procedure: IRRIGATION AND DEBRIDEMENT, WOUND  - PELVIC OSTEOMYELITIS;  Surgeon: You Olivares M.D.;  Location: SURGERY [facility];  Service: Orthopedics    OTHER SURGICAL PROCEDURE  2019    osteomelitis of pelvis cleaned    GASTRIC BYPASS LAPAROSCOPIC      Lucila en y    CHOLECYSTECTOMY      OTHER SURGICAL PROCEDURE      repair of broken penis.  23:  Patient denies any surgery on penis.     Social History     Tobacco Use    Smoking status: Some Days     Current packs/day: 0.00     Types: Cigarettes, Cigars     Last attempt to quit: 2023     Years since quittin.8     Passive exposure: Current    Smokeless tobacco: Never    Tobacco comments:     occasional cigar.  23:  Patient declines smoking cessation information at this time.    Substance Use Topics    Alcohol use: No     Alcohol/week: 0.0 oz     Comment: quit  ago- past ETOH abuse     No chief complaint on file.    Diagnosis: Right foot pain [M79.671]  Acute right ankle pain [M25.571]  Dislocation of right subtalar joint, initial encounter [S93.314A]  Sepsis (HCC) [A41.9]    Unit where seen by Wound Team: T730/     WOUND FOLLOW UP RELATED TO:  Sacrum       WOUND TEAM PLAN OF CARE - Frequency of Follow-up:   Nursing to follow dressing orders written for wound care. Contact wound team if area fails to progress, deteriorates or with any questions/concerns if something comes up before next scheduled follow up (See below as to whether wound is following and frequency of wound follow up)  Dressing changes by wound team:                   Weekly - sacrum    WOUND HISTORY:       Patient with diagnosis of pes planovalgus admitted  and had surgery with Dr. Pryor related to right foot:  Right subtalar joint dislocation  Right talonavicular dislocation   Right ankle equinus contracture  Severe right pes planovalgus  Rheumatoid arthritis  Patient underwent L BKA with Dr. Chambers in March related to foot wound.  In October he had 2 I&Ds with ortho related to abscess.   Patient had a wound vac.  Unclear why he does not have a VAC dressing now.      12/18 Wound Team consulted for new discoloration to sacrum         WOUND ASSESSMENT/LDA  Wound 12/18/23 Pressure Injury Sacrum Left 12/20 DTI (Active)   Date First Assessed/Time First Assessed: 12/18/23 (c) 1123   Location: Sacrum  Laterality: Left  Wound Description (Comments): 12/20 DTI      Assessments 12/20/2023  3:00 PM   Wound Image     Site Assessment Purple   Periwound Assessment Red   Margins Attached edges;Defined edges   Closure None   Drainage Amount None   Treatments Cleansed;Site care;Offloading   Wound Cleansing Foam Cleanser/Washcloth   Periwound Protectant Moisture Barrier   Dressing Status Clean;Dry;Intact   Dressing Changed Changed   Dressing Cleansing/Solutions Not Applicable   Dressing Options Offloading Dressing - Sacral   Dressing Change/Treatment Frequency Every 72 hrs, and As Needed   NEXT Dressing Change/Treatment Date 12/23/23   NEXT Weekly Photo (Inpatient Only) 12/27/23   Wound Team Following Weekly   Wound Length (cm) 0.6 cm   Wound Width (cm) 1.5 cm   Wound Depth (cm) 0 cm   Wound Surface Area (cm^2) 0.9 cm^2   Wound Volume (cm^3) 0 cm^3   Shape Irregular   Wound Odor None   WOUND NURSE ONLY - Time Spent with Patient (mins) 30       Moisture Associated Skin Damage 12/20/23 Buttock (Active)   First Observed Date/First Observed Time: 12/20/23 1500   Laterality: Bilateral  Wound Location : Buttock      Assessments 12/20/2023  3:00 PM   NEXT Weekly Photo (Inpatient Only) 12/27/23   Drainage Amount None   Periwound Assessment Rash;Red   IAD Cleansing Foam Cleanser/Washcloth   Periwound Protectant Antifungal Therapy   WOUND NURSE ONLY - Time Spent with Patient (mins) 30        Vascular:    SUSHMA:   No results found.    Lab Values:    Lab Results   Component Value Date/Time    WBC 12.9 (H) 12/20/2023 01:50 PM    WBC 8.4 10/19/2010 12:05 PM    RBC 3.19 (L) 12/20/2023 01:50 PM    RBC 4.96 10/19/2010 12:05 PM     HEMOGLOBIN 8.6 (L) 12/20/2023 01:50 PM    HEMATOCRIT 27.9 (L) 12/20/2023 01:50 PM    CREACTPROT 0.43 11/27/2023 02:30 PM    SEDRATEWES 40 (H) 11/27/2023 02:30 PM    HBA1C 4.7 10/28/2023 05:38 AM         Culture Results show:  Recent Results (from the past 720 hour(s))   CULTURE WOUND W/ GRAM STAIN    Collection Time: 12/11/23  2:04 PM    Specimen: Left Leg; Wound   Result Value Ref Range    Significant Indicator POS (POS)     Source WND     Site LEFT LEG     Culture Result - (A)     Gram Stain Result Moderate WBCs.  No organisms seen.       Culture Result Serratia marcescens  Light growth   (A)        Susceptibility    Serratia marcescens - MARÍA     Ampicillin >16 Resistant mcg/mL     Amoxicillin/CA >16/8 Resistant mcg/mL     Ceftriaxone <=1 Sensitive mcg/mL     Cefazolin >16 Resistant mcg/mL     Ciprofloxacin <=0.25 Sensitive mcg/mL     Cefepime <=2 Sensitive mcg/mL     Cefuroxime >16 Resistant mcg/mL     Ampicillin/sulbactam >16/8 Resistant mcg/mL     Tobramycin <=2 Sensitive mcg/mL     Ertapenem <=0.5 Sensitive mcg/mL     Gentamicin <=2 Sensitive mcg/mL     Levofloxacin <=0.5 Sensitive mcg/mL     Minocycline <=4 Sensitive mcg/mL     Moxifloxacin <=2 Sensitive mcg/mL     Pip/Tazobactam <=8 Sensitive mcg/mL     Trimeth/Sulfa <=0.5/9.5 Sensitive mcg/mL     Tigecycline <=2 Sensitive mcg/mL       Pain Level/Medicated:  Patient denies pain       INTERVENTIONS BY WOUND TEAM:  Chart and images reviewed. Discussed with bedside RN. All areas of concern (based on picture review, LDA review and discussion with bedside RN) have been thoroughly assessed. Documentation of areas based on significant findings. This RN in to assess patient. Performed standard wound care which includes appropriate positioning, dressing removal and non-selective debridement. Pictures and measurements obtained weekly if/when required.    Wound:  Sacrum  Preparation for Dressing removal: Removed without difficulty  Cleansed/Non-selectively Debrided  with:  No rinse foam soap and Moist warm washcloth  [place] wound: Cleansed with No rinse foam soap and Moist warm washcloth, Prepped with N/A  Primary Dressing:  sacral offloading foam     Wound:  Buttocks  Preparation for Dressing removal: Open to air  Cleansed/Non-selectively Debrided with:  No rinse foam soap and Moist warm washcloth  [place] wound: Cleansed with No rinse foam soap and Moist warm washcloth, Prepped with N/A  Primary Dressing:  miconazole miguel ordered    Advanced Wound Care Discharge Planning  Number of Clinicians necessary to complete wound care: 1  Is patient requiring IV pain medications for dressing changes:  No   Length of time for dressing change 30 min. (This does not include chart review, pre-medication time, set up, clean up or time spent charting.)    Interdisciplinary consultation: Patient, Flori DAMIAN (Wound RN).  Pressure injury and staging reviewed with Flori DAMIAN (Wound RN).    EVALUATION / RATIONALE FOR TREATMENT:     Date:  12/20/23  Wound Status:  Initial evaluation    Patient left sacrum with nonblanchable purple discoloration. Sacral offloading dressing applied.     Buttocks also noted to have reddened rash concerning for yeast. Miconazole miguel ordered for antifungal therapy      Date:  12/13/23  Wound Status:  Wound progressing as expected  Less slough overlying wound bed. Patient still not medically stable for L BKA revision at this time. Continuing Dakins wet to dry dressing in the mean time.  Sacrum with small partial thickness skin loss, likely related to friction with some moisture components as well.    Date:  12/07/23  Wound Status:  Initial evaluation    Wound with bone/periosteum exposed.  Updated Dr. Pryor who preformed R foot surgery.  Dr. Pryor agreed to have member of surgery team take a look at wound and determine if/when a revision would be appropriate.  For now, dakin's moistened packing to wound bed to encourage wicking of fluid and aid in removal of  dead tissue.           Goals: Steady decrease in wound area and depth weekly.    NURSING PLAN OF CARE ORDERS:  Dressing changes: See Dressing Care orders  Skin care: See Skin Care orders  RN Prevention Protocol    NUTRITION RECOMMENDATIONS   Wound Team Recommendations:  N/A     DIET ORDERS (From admission to next 24h)       Start     Ordered    12/20/23 2079  Diet NPO Restrict to: Sips with Medications  AT MIDNIGHT      Question:  Diet NPO Restrict to:  Answer:  Sips with Medications    12/20/23 1330    12/17/23 0857  Diet Order Diet: Cardiac  ALL MEALS        Question:  Diet:  Answer:  Cardiac    12/17/23 0856    12/14/23 0939  Supplements  ONCE        Question Answer Comment   Which Supplement Ensure    Ensure: Ensure Plus Carton BID       12/14/23 0939                    PREVENTATIVE INTERVENTIONS:   Q shift Nam - performed per nursing policy  Q shift pressure point assessments - performed per nursing policy    Surface/Positioning  Reposition q 2 hours - Currently in Place  TAPs Turning system - Currently in Place  Waffle overlay  - Currently in Place    Offloading/Redistribution  Sacral offloading dressing (Silicone dressing) - Applied this Visit      Containment/Moisture Prevention    Antifungal treatment - Ordered    Anticipated discharge plans:  TBD        Vac Discharge Needs:  Vac Discharge plan is purely a recommendation from wound team and not a requirement for discharge unless otherwise stated by physician.  Not Applicable Pt not on a wound vac

## 2023-12-20 NOTE — PROGRESS NOTES
Report called to FAITH Hanley on T7.   Patient transferring to T730 bed1..  VSS.   Patient updated on plan of care.

## 2023-12-20 NOTE — CARE PLAN
The patient is Stable - Low risk of patient condition declining or worsening    Shift Goals  Clinical Goals: Hemodynamic stability  Patient Goals: pain control  Family Goals: JI    Progress made toward(s) clinical / shift goals:    Problem: Fall Risk  Goal: Patient will remain free from falls  Outcome: Progressing     Problem: Knowledge Deficit - Standard  Goal: Patient and family/care givers will demonstrate understanding of plan of care, disease process/condition, diagnostic tests and medications  Outcome: Progressing     Problem: Pain - Standard  Goal: Alleviation of pain or a reduction in pain to the patient’s comfort goal  Outcome: Progressing     Problem: Skin Integrity  Goal: Skin integrity is maintained or improved  Outcome: Progressing     Problem: Hemodynamics  Goal: Patient's hemodynamics, fluid balance and neurologic status will be stable or improve  Outcome: Progressing

## 2023-12-21 PROBLEM — G89.29 CHRONIC PAIN: Status: RESOLVED | Noted: 2021-01-18 | Resolved: 2023-01-01

## 2023-12-21 NOTE — PROCEDURES
Vascular Access Team     Date of Insertion: 12/21/23  Arm Circumference: 32  Internal length: 0  External Length: 45  Vein Occupancy %: 25   Reason for PICC: abx  Labs: WBC 13.5, , BUN 22, Cr 0.91, , INR 1.28     Consents confirmed, vessel patency confirmed with ultrasound. Risks and benefits of procedure explained to patient and education regarding central line associated bloodstream infections provided. Questions answered.      PICC placed in LUE per licensed provider order with ultrasound guidance.  4 Fr, single lumen PICC placed in brachial vein after 1 attempt(s). 2 mL of 1% lidocaine injected intradermally at the insertion site. A 21 gauge microintroducer needle was visualized entering the vein and modified Seldinger technique was used to obtain access to the vein. 45 cm catheter inserted and brisk blood return was observed from each lumen upon aspiration. Line secured at the 0 cm marker. TCS stylet removed and observed to be fully intact. Each lumen flushed using pulsatile method without resistance with 10 mL 0.9% normal saline. PICC line secured with Biopatch and Tegaderm.     PICC tip placement location is confirmed by nurse to be in the Superior Vena Cava (SVC) utilizing 3CG technology. PICC line is appropriate for use at this time. Patient tolerated procedure well, without complications.  Patient condition relayed to primary RN or ordering physician via this post procedure note in the EMR.      Ultrasound images uploaded to PACS and viewable in the EMR - yes  Ultrasound imaged printed and placed in paper chart - no     BARD Power PICC ref # [id number], Lot # [id number], Expiration Date 11/30/24

## 2023-12-21 NOTE — PROGRESS NOTES
Cardiology Follow Up Progress Note    Date of Service  12/21/2023    Attending Physician  Gil Dalton M.D.    Chief Complaint   A-fib RVR    HPI  Richard Hubbard II is a 48 y.o. male admitted 12/6/2023 with progressive flatfoot deformity/superficial wound presents for open reduction internal fixation of the right subtalar dislocation.    PMH: Bicuspid aortic valve, PAF, sleep apnea, HFpEF, rheumatoid arthritis, left below the knee amputation, recent ankle surgery 12/6/2023 complicated by KATERYNA, hyperkalemia    Interim Events  Overnight hypotensive received IV fluids.  Discontinue torsemide  SBP appropriate this morning  Telemetry-SR  Underwent successful coronary angiography 12/20/2023 revealed severe aortic stenosis, normal-appearing epicardial coronary arteries.  Review of Systems  Review of Systems   Respiratory:  Negative for apnea, cough, choking, chest tightness, shortness of breath, wheezing and stridor.        Vital signs in last 24 hours  Temp:  [36.2 °C (97.2 °F)-36.8 °C (98.2 °F)] 36.2 °C (97.2 °F)  Pulse:  [70-85] 85  Resp:  [16-24] 20  BP: (75-99)/(39-55) 96/53  SpO2:  [91 %-95 %] 93 %    Physical Exam  Physical Exam  Cardiovascular:      Rate and Rhythm: Normal rate and regular rhythm.      Pulses: Normal pulses.      Heart sounds: Murmur heard.   Pulmonary:      Effort: Pulmonary effort is normal.   Skin:     General: Skin is warm.   Neurological:      Mental Status: He is alert. Mental status is at baseline.   Psychiatric:         Mood and Affect: Mood normal.         Lab Review  Lab Results   Component Value Date/Time    WBC 13.5 (H) 12/21/2023 12:10 AM    WBC 8.4 10/19/2010 12:05 PM    RBC 2.92 (L) 12/21/2023 12:10 AM    RBC 4.96 10/19/2010 12:05 PM    HEMOGLOBIN 7.8 (L) 12/21/2023 12:10 AM    HEMATOCRIT 25.8 (L) 12/21/2023 12:10 AM    MCV 88.4 12/21/2023 12:10 AM    MCV 85 10/19/2010 12:05 PM    MCH 26.7 (L) 12/21/2023 12:10 AM    MCH 28.4 10/19/2010 12:05 PM    MCHC 30.2 (L) 12/21/2023  "12:10 AM    MPV 10.8 12/21/2023 12:10 AM      Lab Results   Component Value Date/Time    SODIUM 129 (L) 12/21/2023 12:10 AM    POTASSIUM 3.6 12/21/2023 12:10 AM    CHLORIDE 96 12/21/2023 12:10 AM    CO2 22 12/21/2023 12:10 AM    GLUCOSE 91 12/21/2023 12:10 AM    BUN 22 12/21/2023 12:10 AM    CREATININE 0.91 12/21/2023 12:10 AM    CREATININE 0.95 10/19/2010 12:05 PM    BUNCREATRAT 9 10/19/2010 12:05 PM    GLOMRATE >59 10/19/2010 12:05 PM      Lab Results   Component Value Date/Time    ASTSGOT 18 12/21/2023 12:10 AM    ALTSGPT 17 12/21/2023 12:10 AM     Lab Results   Component Value Date/Time    CHOLSTRLTOT 130 01/28/2021 03:30 PM    LDL 48 01/28/2021 03:30 PM    HDL 68 01/28/2021 03:30 PM    TRIGLYCERIDE 72 01/28/2021 03:30 PM    TROPONINT 27 (H) 12/08/2023 10:03 PM       No results for input(s): \"NTPROBNP\" in the last 72 hours.      Assessment/Plan    # Severe aortic stenosis   # history of bicuspid aortic valve  # Recent MSSA bacteremia, right foot surgery 12/6/2023, reamputation of left leg below the knee 12/16/2023.  # Moderate to severe MR.  # LVEF 45%, chronic cardiomyopathy..  # A-fib RVR.  # History of PAF (2019)  # Gastric bypass  # Rheumatoid arthritis  # TAA 4.5 cm      -Plan for outpatient TAVR likely in a month pending ID clearance.  -Spontaneously reverted to sinus rhythm this morning.  -Maintaining sinus rhythm  -Discontinue IV heparin, transition to Eliquis 5 twice daily  -GDMT Farxiga 10, spironolactone 25  -Pre-TAVR CT pending.  -Holding BB/ACE/ARB secondary to hypotension    I personally spent a total of 15 minutes which includes face-to-face time and non-face-to-face time spent on preparing to see the patient, reviewing hospital notes and tests, obtaining history from the patient, performing a medically appropriate exam, counseling and educating the patient, ordering medications/tests/procedures/referrals as clinically indicated, and documenting information in the electronic medical record. "       Thank you for allowing me to participate in the care of this patient.  I will continue to follow this patient    Please contact me with any questions.    MAURICIO Marsh.   Cardiologist, [facility]  [phone]

## 2023-12-21 NOTE — CARE PLAN
The patient is Stable - Low risk of patient condition declining or worsening    Shift Goals  Clinical Goals: Hemodynamic stability  Patient Goals: pain control  Family Goals: JI      Problem: Knowledge Deficit - Standard  Goal: Patient and family/care givers will demonstrate understanding of plan of care, disease process/condition, diagnostic tests and medications  Outcome: Progressing     Patient verbalized an understanding of the plan of care.    Problem: Pain - Standard  Goal: Alleviation of pain or a reduction in pain to the patient’s comfort goal  Outcome: Progressing     Patient verbalized the importance of pain management.

## 2023-12-21 NOTE — DISCHARGE PLANNING
Follow up return call from spouse. Katiana is onboard with IRF she feels that it build confidence not just strength for a successful transition home. Discuss current skin issues Katiana tells me that  is prone to fungal infection secondary to immunosuppressive drugs. Katiana is hopeful for IRF level of care.

## 2023-12-21 NOTE — DISCHARGE PLANNING
Insurance has authorized.  Hep gtt & IV analgesics are a barrier.  Would appreciate updated [place] evals once appropriate.

## 2023-12-21 NOTE — PROGRESS NOTES
"Hospital Medicine Daily Progress Note    Date of Service  12/21/2023    Chief Complaint  Richard Hubbard II is a 48 y.o. male admitted 12/6/2023 for \"an internal fixation of the right subtalar dislocation    Hospital Course    This is a 48-year-old male with a past medical history significant for moderate AS, bicuspid aortic valve, mildly to moderate pulmonary hypertension with RSVP of 50, history of gastric bypass, history of rheumatoid arthritis on Humira, left BKA stump abscess/osteomyelitis complicated with MSSA bacteremia on 10/23. Patient was supposed to be on Ancef daily 12/1 and continue Augmentin 875/125 1 tablet p.o. twice daily for 2 weeks per ID recs    He was admitted on 12/6 by orthopedic surgery and he was noted to have progressive flatfoot on the right side.  He underwent open reduction of right subtalar joint dislocation, right talonavicular joint dislocation right foot triple arthrodesis, right extensor tendon lengthening and application of multiplanar external fixator right lower extremity on 12/6.     Per orthopedic surgery, patient will be nonweightbearing to toe-touch weightbearing for transfer on the right lower extremity.  12/8, Response was called, found to be in A-fib with RVR.      Of note patient was noted to be in cardiogenic shock on pressures, echocardiogram showed EF 45%, severely AAS, moderate AI, moderate to severe TR, RSVP 66.  Cardiology was consulted, plan for TAVR in the next month.  Patient underwent cardiac cath on 12/20.  Cards recommend amiodarone initially, switched to dronabinol 2.5 mg p.o. twice daily on 12/20; along with heparin drip.    Of note patient has infected left BKA wound, he underwent Reamputation of left leg below the knee, patient is nonweightbearing on the left lower extremity.  Will need PT/OT, DVT prophylaxis Multimodal pain management.  Intraoperative culture growing Serratia marcescens.  Patient was started on ceftriaxone plus Zyvox since " 12/9-12/13 and changed to Cefepime on 12/13.       Interval events:  --- No acute events overnight, vital sign has been stable, patient is alert, awake and answering questions appropriately, he is on 3 L of oxygen  --Patient has infected left BKA wound, culture growing Serratia, ID was consulted, patient will need 6 weeks of IV antibiotics.  Will obtain PICC line  -- Cardiology continue to follow the patient, currently patient is in sinus rhythm and maintaining it.  Heparin was discontinued, will continue Eliquis 5 mg p.o. twice daily.  -Currently we are holding beta-blocker, ACE/ARB secondary to hypotension.-  Plan for TAVR next month.    I have discussed this patient's plan of care and discharge plan at IDT rounds today with Case Management, Nursing, Nursing leadership, and other members of the IDT team.    Consultants/Specialty  cardiology, critical care, and orthopedics    Code Status  Full Code    Disposition  The patient is not medically cleared for discharge to home or a post-acute facility.  Anticipate discharge to: an inpatient rehabilitation hospital    I have placed the appropriate orders for post-discharge needs.    Review of Systems  Review of Systems   Constitutional:  Positive for malaise/fatigue. Negative for fever.   HENT:  Negative for hearing loss.    Eyes:  Negative for blurred vision.   Respiratory:  Negative for cough, sputum production and shortness of breath.    Cardiovascular:  Negative for chest pain and leg swelling.   Gastrointestinal:  Negative for abdominal pain and nausea.   Genitourinary:  Negative for dysuria and urgency.   Musculoskeletal:  Positive for joint pain and myalgias. Negative for back pain and neck pain.   Neurological:  Negative for dizziness, sensory change and focal weakness.   Psychiatric/Behavioral:  Negative for substance abuse.    All other systems reviewed and are negative.       Physical Exam  Temp:  [36.2 °C (97.2 °F)-36.8 °C (98.2 °F)] 36.3 °C (97.3 °F)  Pulse:   [70-89] 89  Resp:  [16-24] 18  BP: (75-99)/(39-55) 98/54  SpO2:  [91 %-94 %] 91 %    Physical Exam  Vitals reviewed.   HENT:      Head: Normocephalic and atraumatic. No contusion.   Eyes:      Pupils: Pupils are equal, round, and reactive to light.   Cardiovascular:      Rate and Rhythm: Normal rate and regular rhythm.   Pulmonary:      Effort: Pulmonary effort is normal.      Breath sounds: No wheezing or rhonchi.   Abdominal:      General: Bowel sounds are normal. There is no distension.      Palpations: Abdomen is soft.      Tenderness: There is no abdominal tenderness. There is no rebound.   Musculoskeletal:         General: Normal range of motion.      Cervical back: No rigidity. No muscular tenderness.      Comments: Left BKA  External fixator on right   Skin:     General: Skin is warm.   Neurological:      General: No focal deficit present.      Mental Status: He is alert.      Cranial Nerves: No cranial nerve deficit.      Sensory: No sensory deficit.   Psychiatric:         Mood and Affect: Mood normal.         Fluids    Intake/Output Summary (Last 24 hours) at 12/21/2023 1519  Last data filed at 12/21/2023 1100  Gross per 24 hour   Intake 800 ml   Output 870 ml   Net -70 ml         Laboratory  Recent Labs     12/20/23  0524 12/20/23  1350 12/21/23  0010   WBC 14.0* 12.9* 13.5*   RBC 3.39* 3.19* 2.92*   HEMOGLOBIN 9.1* 8.6* 7.8*   HEMATOCRIT 30.1* 27.9* 25.8*   MCV 88.8 87.5 88.4   MCH 26.8* 27.0 26.7*   MCHC 30.2* 30.8* 30.2*   RDW 53.0* 51.4* 53.1*   PLATELETCT 189 148* 194   MPV 10.6 11.1 10.8       Recent Labs     12/20/23  0524 12/20/23  1350 12/21/23  0010   SODIUM 129* 130* 129*   POTASSIUM 4.4 3.9 3.6   CHLORIDE 99 96 96   CO2 22 21 22   GLUCOSE 100* 108* 91   BUN 20 20 22   CREATININE 0.77 0.81 0.91   CALCIUM 7.9* 7.6* 7.4*       Recent Labs     12/20/23  1350 12/20/23 2028   APTT 27.1 37.2*   INR 1.31* 1.28*                 Imaging  DX-FOOT-COMPLETE 3+ RIGHT   Final Result      1.  ORIF with  extensive hardware consistent with known history of triple arthrodesis.   2.  External fixator hardware is also present.      DX-CHEST-PORTABLE (1 VIEW)   Final Result         1.  Pulmonary edema and/or infiltrates are identified, which are stable since the prior exam.   2.  Trace bilateral pleural effusions   3.  Cardiomegaly      DX-CHEST-PORTABLE (1 VIEW)   Final Result         1.  Coronary edema and/or infiltrates, stable since prior study.   2.  Trace right pleural effusion, stable   3.  Cardiomegaly      US-EXTREMITY VENOUS LOWER BILAT   Final Result      EC-ECHOCARDIOGRAM COMPLETE W/ CONT   Final Result      US-RUQ   Final Result         1. Hepatic steatosis.   2. Otherwise, normal.      DX-CHEST-PORTABLE (1 VIEW)   Final Result         1. [place] catheter tip is located within a right lower lobe pulmonary arterial branch, repositioning may be indicated.   2. Stable pulmonary edema, cardiomegaly, and small pleural effusions.      DX-CHEST-PORTABLE (1 VIEW)   Final Result      1.  Small right pleural effusion with adjacent airspace disease.   2.  Worsening interstitial infiltrates and/or edema.      DX-CHEST-LIMITED (1 VIEW)   Final Result      1.  Enlarged cardiac silhouette with changes of vascular congestion.      DX-PORTABLE FLUORO > 1 HOUR   Final Result      Portable fluoroscopy utilized for 1 minute 48 seconds.         INTERPRETING LOCATION: [address] [facility], [facility], [address]      DX-FOOT-COMPLETE 3+ RIGHT   Final Result      Digitized intraoperative radiograph is submitted for review. This examination is not for diagnostic purpose but for guidance during a surgical procedure. Please see the patient's chart for full procedural details.         INTERPRETING LOCATION: [address] , [facility], [address]      CL-LEFT HEART CATHETERIZATION WITH POSSIBLE INTERVENTION    (Results Pending)   CT-TAVR CHEST-ABD-PELVIS W/WO POST PROCESS    (Results Pending)   IR-PICC LINE PLACEMENT W/ GUIDANCE > AGE 5    (Results Pending)         Assessment/Plan  * Cardiogenic shock (HCC)  Assessment & Plan  Admitted to the ICU 12/09/23 with suspected BiV failure    - Continue to monitor while off pressors  - IV lasix changed to PO torsemide  - Goal SBP > 90    Now reslved    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)- (present on admission)  Assessment & Plan  Last echo from October 20, 2023 with EF 55%, grade 1 diastolic dysfunction with moderate AAS/AI and RVSP of 45 mmHg --> now in biventricular failure as seen on POCUS per Dr. Gonda 12/9    - Repeat echo 12/9 with EF 45% and severe AS  Cardiology has been following him   I/Os daily, cardiac diet    S/P BKA (below knee amputation), left (HCC)- (present on admission)  Assessment & Plan  With infected stump  -- Orthopedic surgery was consulted, patient underwent reimplantation of the left BKA wound.  Intraoperative culture growing Serratia  ID consulted, will need IV antibiotics for 6 weeks  Will obtain PICC line    Hypotension  Assessment & Plan  Blood pressure on the lower side, monitor  Holding ACE, ARB, beta-blocker    Hyponatremia- (present on admission)  Assessment & Plan  At 129, monitor    Sepsis (HCC)- (present on admission)  Assessment & Plan  Not POA  Resolved   - Linezolid + ceftriaxone changed to Cefepime  Patient underwent I&D on 12/17, culture growing Serratia, ID consulted, continue cefepime, will need 6 weeks of IV antibiotics    Aortic stenosis due to bicuspid aortic valve- (present on admission)  Assessment & Plan    Moderate on last echo in October 2023  - Repeat echo revealing severe AS  Cardiology has been following and  planning for TAVR    Hx of gastric bypass  Assessment & Plan  Encourage ongoing weight loss management    Atrial fibrillation with RVR (HCC)- (present on admission)  Assessment & Plan  History of A-fib, went into AVR 12/8, converted back to normal sinus rhythm 12/9, now back in afib 12/10    On 12/21:  --Noted to be in Sinus rhythm, started on  Eliquis 5 mg twice daily  --follow cards recs      Elevated LFTs  Assessment & Plan      - Improving  - Maintain adequate perfusion  - RUQ US reassuring  - Hepatitis panel negative  - Avoid hepatotoxins  Ordered CMP for tomorrow    Immunosuppression (HCC)  Assessment & Plan  Holding Humira for now given infected left lower extremity    Dislocation of right subtalar joint  Assessment & Plan  He underwent open reduction of right subtalar joint dislocation, right talonavicular joint dislocation right foot triple arthrodesis, right extensor tendon lengthening and application of multiplanar external fixator right lower extremity on 12/6 by ortho  Per orthopedic surgery, patient will be nonweightbearing to toe-touch weightbearing for transfer on the right lower extremity  - PT/OT as tolerated  - DVTppx with Lovenox  - Pain regimen  - Wound care    Hypokalemia- (present on admission)  Assessment & Plan  Replete as needed    Acute kidney injury (HCC)- (present on admission)  Assessment & Plan  Now RESOLVED  Secondary to cardiogenic shock/ATN     Rheumatoid arthritis (HCC)- (present on admission)  Assessment & Plan  As needed analgesics  Holding immunosuppressants for now in the setting of infection      I  VTE prophylaxis: eliquis

## 2023-12-21 NOTE — CONSULTS
[facility] INFECTIOUS DISEASES INPATIENT CONSULT NOTE     Date of Service: 12/21/2023    Consult Requested By: Gil Dalton M.D.    Reason for Consultation: Stump infection    Chief Complaint: Infection    History of Present Illness:     Richard Hubbard II is a 48 y.o. male admitted 12/6/2023.  Extensive review of documentation from multiple specialties performed in generating this HPI.  Patient with rheumatoid arthritis with rheumatoid nodules on Humira per notes, chronic bilateral lower extremity ulcers since at least 2022.  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  He was readmitted with BKA abscess with MSSA bacteremia, underwent I&D down to bone on 10/19/2023, revision of left BKA 10/22 with primary closure.  He completed 6 weeks of IV Ancef on 12/1/2023 and plan was to follow this with an additional 2 weeks of p.o. Augmentin through 12/15/2023.    Patient was now readmitted with right lower extremity progressive flatfoot deformity with superficial wound without infection and was admitted for elective intervention.  On 12/6, he underwent open reduction of right subtalar and talonavicular joint dislocations, underwent right foot triple arthrodesis, external fixator placement.    He was also noticed to have chronic wound over the prior left BKA stump.  He was taken to the OR on 12/16 for debridement.  Open wound was noted to lead directly down to the tibial stump without muscular coverage.  Thus, a saw was used to recut the tibia and the stump was closed.  OR cultures are negative but previous superficial swab from 12/11 from the chronic wound grew Serratia.  Pathology from this procedure notes no acute osteomyelitis but does note area of ulceration and necrosis with some bone marrow fibrosis and eroded cartilage with necrosis of the articular surface edge.    Review of Systems:  All other systems reviewed and are  negative expect as noted in HPI    Past Medical History:   Diagnosis Date    ADD (attention deficit disorder)     Alcohol abuse     Allergic rhinitis 05/21/2009    Anemia 09/2019    Anxiety 05/21/2009    Apnea, sleep     Arrhythmia     afib when hospitaized for infection    Back pain 05/21/2009    Bipolar disorder (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)     hx of bleeding ulcers    History of bleeding ulcers 02/12/2015    Hypertension 05/03/2021 12/7/23:  Patient states he has Never had high blood pressure. pt states well controlled on meds    Insomnia 05/21/2009    Intestinal hernia     Migraine 05/21/2009    Morning headache     Obesity, morbid ([facility]) 05/21/2009    Osteomyelitis ([facility]) 10/07/2019    Pain 09/2019    Chronic knee and back pain    Pneumonia     12/4/23:  Last time was around 1 year ago.    Pubic ramus fracture (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    Snoring        Past Surgical History:   Procedure Laterality Date    KNEE AMPUTATION BELOW Left 12/16/2023    Procedure: debridement, secondary closure of left revision amputation;  Surgeon: You Olivares M.D.;  Location: SURGERY [facility];  Service: Orthopedics    ORIF, ANKLE Right 12/6/2023    Procedure: RIGHT SUBTALAR DISLOCATION OPEN REDUCTION INTERNAL FIXATION, TALONAVICULAR OPEN  REDUCTION INTERNAL FIXATION, ACHILLES TENDON LENGTHENING, TRIPLE ARTHRODESIS, RIGHT LOWER EXTREMITY APPLICATION OF MULTIPLANAR EXTERNAL FIXATOR;  Surgeon: Calvin Pryor M.D.;  Location: [facility];  Service: Orthopedics    INCISION AND DRAINAGE ORTHOPEDIC Left 10/22/2023    Procedure: REVISION, BELOW KNEE AMPUTATION;  Surgeon: River Colindres M.D.;  Location: [facility];  Service: Orthopedics    IRRIGATION & DEBRIDEMENT GENERAL Left 10/19/2023    Procedure: IRRIGATION AND DEBRIDEMENT, WOUND;  Surgeon: Jay Roe M.D.;  Location: [facility];  Service: Trauma    KNEE AMPUTATION BELOW Left 03/09/2023    Procedure: AMPUTATION, BELOW KNEE;  Surgeon: Wayne Chambers M.D.;  Location: [facility];  Service: Orthopedics    PB TOTAL KNEE ARTHROPLASTY Right 07/22/2020    Procedure: ARTHROPLASTY, KNEE, TOTAL;  Surgeon: Temo Pires M.D.;  Location: [facility];  Service: Orthopedics    TENDON TRANSFER Right 01/22/2020    Procedure: RIGHT DISTAL ULNA RESECTION AND EXTENSOR TENDON TRANSFER;  Surgeon: Marine Rushing M.D.;  Location: [facility];  Service: Orthopedics    OTHER ORTHOPEDIC SURGERY Right 2020    total right knee replacement    IRRIGATION & DEBRIDEMENT ORTHO Left 10/09/2019    Procedure: IRRIGATION AND DEBRIDEMENT, WOUND - PELVIC OSTEOMYELITIS;  Surgeon: You Olivares M.D.;  Location: [facility];  Service: Orthopedics    OTHER SURGICAL PROCEDURE  2019    osteomelitis of pelvis cleaned    GASTRIC BYPASS LAPAROSCOPIC  2000    Lucila en y    CHOLECYSTECTOMY  2000    OTHER SURGICAL PROCEDURE      repair of broken penis.  12/4/23:  Patient denies any surgery on penis.       Family History   Problem Relation Age of Onset    Hypertension Mother     Arthritis Mother     Depression Mother     Cancer Father         bladder    Schizophrenia Father     Cancer Maternal Grandmother         breast    Heart Disease Maternal  Grandmother     Psychiatric Illness Other     Stroke Maternal Aunt     Other Neg Hx         no connective tissue disorders or known aortic disease       Social History     Socioeconomic History    Marital status:      Spouse name: Not on file    Number of children: Not on file    Years of education: Not on file    Highest education level: Bachelor's degree (e.g., BA, AB, BS)   Occupational History    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  23:  Patient declines smoking cessation information at this time.    Vaping Use    Vaping Use: Never used   Substance and Sexual Activity    Alcohol use: No     Alcohol/week: 0.0 oz     Comment: quit  ago- past ETOH abuse    Drug use: Never    Sexual activity: Yes     Partners: Female     Comment: ;    Other Topics Concern     Service No    Blood Transfusions Yes    Caffeine Concern No    Occupational Exposure No    Hobby Hazards No    Sleep Concern No    Stress Concern Yes    Weight Concern Yes    Special Diet No    Back Care No    Exercise No    Bike Helmet No    Seat Belt Yes    Self-Exams Yes   Social History Narrative    , lives in [place]     Social Determinants of Health     Financial Resource Strain: Low Risk  (2023)    Overall Financial Resource Strain (CARDIA)     Difficulty of Paying Living Expenses: Not very hard   Food Insecurity: No Food Insecurity (2023)    Hunger Vital Sign     Worried About Running Out of Food in the Last Year: Never true     Ran Out of Food in the Last Year: Never true   Transportation Needs: Unmet Transportation Needs (10/30/2023)    PRAPARE - Transportation     Lack of Transportation (Medical): Yes     Lack of Transportation (Non-Medical): Yes  "  Physical Activity: Sufficiently Active (9/7/2023)    Exercise Vital Sign     Days of Exercise per Week: 7 days     Minutes of Exercise per Session: 30 min   Stress: Stress Concern Present (9/7/2023)    [employer] - Occupational Stress Questionnaire     Feeling of Stress : Rather much   Social Connections: Socially Integrated (9/7/2023)    Social Connection and Isolation Panel [NHANES]     Frequency of Communication with Friends and Family: More than three times a week     Frequency of Social Gatherings with Friends and Family: Twice a week     Attends Congregation Services: More than 4 times per year     Active Member of Clubs or Organizations: Yes     Attends Club or Organization Meetings: More than 4 times per year     Marital Status:    Intimate Partner Violence: Not At Risk (9/7/2023)    Humiliation, Afraid, Rape, and Kick questionnaire     Fear of Current or Ex-Partner: No     Emotionally Abused: No     Physically Abused: No     Sexually Abused: No   Housing Stability: Low Risk  (9/7/2023)    Housing Stability Vital Sign     Unable to Pay for Housing in the Last Year: No     Number of Places Lived in the Last Year: 1     Unstable Housing in the Last Year: No       Allergies   Allergen Reactions    Nsaids      Bleeding, \"I had a bleeding ulcer\"    Diphenhydramine Unspecified     \"I get agitated\"    Mirtazapine Unspecified     I had a hard time waking up, lacking mental focus.     Penicillins      \"Had some dizziness\"  Tolerated Unasyn 10/2019  Tolerated Zosyn 02/2023    Promethazine Unspecified     \"I get very agitated\"       Medications:    Current Facility-Administered Medications:     apixaban (Eliquis) tablet 5 mg, 5 mg, Oral, BID, Ramin Hoang A.P.N., 5 mg at 12/21/23 0851    ondansetron (Zofran) syringe/vial injection 4 mg, 4 mg, Intravenous, Q4HRS PRN, Arnoldo Washington M.D., 4 mg at 12/20/23 1020    lidocaine (Xylocaine) 1 % injection 0.5 mL, 0.5 mL, " Intradermal, Once PRN, WOODY CordovaRMaryN.    heparin injection 3,500 Units, 40 Units/kg, Intravenous, PRN, SMITH CordovaP.R.N., 3,500 Units at 12/20/23 2243    dronabinol (Marinol) capsule 2.5 mg, 2.5 mg, Oral, BEFORE LUNCH AND DINNER, 2.5 mg at 12/20/23 1745 **FOLLOWED BY** [START ON 12/22/2023] dronabinol (Marinol) capsule 2.5 mg, 2.5 mg, Oral, BID PRN, Jed Pascual D.O.    miconazole (Micotin) 2 % cream, , Topical, BID, Arnoldo Washington M.D., Given at 12/20/23 1746    acetaminophen (Tylenol) tablet 650 mg, 650 mg, Oral, Q6HRS, Todd Soliz, D.O., 650 mg at 12/21/23 0518    HYDROmorphone (Dilaudid) injection 0.5-1 mg, 0.5-1 mg, Intravenous, Q4HRS PRN, Todd Soliz, D.O., 1 mg at 12/21/23 0809    dapagliflozin propanediol (Farxiga) tablet 10 mg, 10 mg, Oral, DAILY, Romaine Clements M.D., 10 mg at 12/21/23 0518    lactulose 20 GM/30ML solution 30 mL, 30 mL, Oral, TID, You Moore M.D., 30 mL at 12/18/23 1755    spironolactone (Aldactone) tablet 25 mg, 25 mg, Oral, Q DAY, Todd Soliz, D.O., 25 mg at 12/20/23 0514    MD Alert...ICU Electrolyte Replacement per Pharmacy, , Other, PHARMACY TO DOSE, Danny Dobbs M.D.    senna-docusate (Pericolace Or Senokot S) 8.6-50 MG per tablet 2 Tablet, 2 Tablet, Oral, BID, 2 Tablet at 12/20/23 1745 **AND** polyethylene glycol/lytes (Miralax) Packet 1 Packet, 1 Packet, Oral, QDAY PRN, 1 Packet at 12/13/23 0518 **AND** magnesium hydroxide (Milk Of Magnesia) suspension 30 mL, 30 mL, Oral, QDAY PRN, 30 mL at 12/13/23 0516 **AND** bisacodyl (Dulcolax) suppository 10 mg, 10 mg, Rectal, QDAY PRN, Danny Dobbs M.D.    oxycodone (Oxy-IR) immediate release tablet 15 mg, 15 mg, Oral, Q6HRS PRN, Gil Dalton M.D., 15 mg at 12/20/23 1138    albuterol (Proventil) 2.5mg/0.5ml nebulizer solution 2.5 mg, 2.5 mg, Nebulization, Q4H PRN (RT), Gil Dalton M.D.    omeprazole (PriLOSEC) capsule 20 mg, 20 mg, Oral, DAILY, Gil Dalton M.D., 20 mg at  "23 0517    DULoxetine (Cymbalta) capsule 60 mg, 60 mg, Oral, DAILY, Torsten Grove M.D., 60 mg at 23 0518    Physical Exam:   Vital Signs: BP 96/53   Pulse 85   Temp 36.2 °C (97.2 °F) (Temporal)   Resp 20   Ht 1.803 m (5' 11\")   Wt 89.5 kg (197 lb 5 oz)   SpO2 93%   BMI 27.52 kg/m²   Temp  Av.6 °C (97.9 °F)  Min: 35.6 °C (96.1 °F)  Max: 37.7 °C (99.9 °F)  Vital signs reviewed  Constitutional: Patient is oriented to person, place, and time. Appears well-developed and well-nourished. No distress  Head: Atraumatic, normocephalic  Eyes: Conjunctivae normal, EOM intact  Mouth/Throat: Lips without lesions  Pulmonary/Chest: No respiratory distress. Unlabored respiratory effort  Abdominal: Soft, non tender. BS + x 4. No masses or hepatosplenomegaly.   Musculoskeletal: Right foot with external fixator in place, left BKA stump with clean and dry dressing, well-approximated  Skin: Skin is warm and dry. No rashes or embolic phenomena noted on exposed skin  Psychiatric: Normal mood and affect. Behavior is normal.     LABS:  Recent Labs     23  0523  1350 23  0010   WBC 14.0* 12.9* 13.5*      Recent Labs     23  0523  1350 23  0010   HEMOGLOBIN 9.1* 8.6* 7.8*   HEMATOCRIT 30.1* 27.9* 25.8*   MCV 88.8 87.5 88.4   MCH 26.8* 27.0 26.7*   PLATELETCT 189 148* 194       Recent Labs     23  0524 23  1350 23  0010   SODIUM 129* 130* 129*   POTASSIUM 4.4 3.9 3.6   CHLORIDE 99 96 96   CO2  22   CREATININE 0.77 0.81 0.91        Recent Labs     23  0523  1350 23  0010   ALBUMIN 3.0* 2.7* 2.6*        MICRO:  Blood Culture Hold   Date Value Ref Range Status   10/20/2023 Collected  Final        Latest pertinent labs were reviewed    IMAGING STUDIES:  As above    Hospital Course/Assessment:   Richard Salinas Stevie AMATO is a 48 y.o. male patient with rheumatoid arthritis with rheumatoid nodules on Humira, history of chronic lower " extremity wounds, history of left foot osteomyelitis with strep status post amputation, complicated by BKA stump infection and MSSA bacteremia, status post 6 weeks of IV Ancef, now with chronic tibial osteomyelitis.  Patient underwent I&D with revision amputation of the left BKA on 12/16, cultures positive for Serratia.    Pertinent Diagnoses:  Left BKA stump infection with tibial osteomyelitis  Serratia infection  Recent MSSA bacteremia and osteomyelitis, completed therapy  Rheumatoid arthritis  Immunosuppressed status    Plan:   -Continue IV cefepime 2 g every 8 hours.  Recommend 6 weeks of IV antibiotics  -Patient prefers home once a day antibiotic options  -Of note, cardiology is also planning an outpatient TAVR in approximately 1 month.  Given that he has had recurrence of BKA stump infection, the infection risk profile for this procedure will depend on clinical course in the coming weeks  -HIV antibody in a.m.    Disposition: On discharge, can do home IV ertapenem 1 g every 24 hours through 1/23/2024  At least weekly CBC with differential, CMP while on IV antibiotics  -Paper home orders for IV antibiotics faxed to  on 12/21 ([id number])  Need for PICC line: Yes, ordered    Plan was discussed with the primary team, Dr. Dalton.  ID will sign off.  This illness poses a threat to limb function.  ID clinic follow-up in 2 to 3 weeks.  Okay to schedule with NP Nayeli Galdamez M.D.    Please note that this dictation was created using voice recognition software. I have worked with technical experts from [facility] to optimize the interface.  I have made every reasonable attempt to correct obvious errors, but there may be errors of grammar and possibly content that I did not discover before finalizing the note.

## 2023-12-21 NOTE — DISCHARGE PLANNING
"HTH/SCP TCN chart review completed.  Current discharge considerations are IRF pending acceptance / medical clearance.  Noted authorization by SCP in chart. Additionally per PMR, patient is \"good candidate\" for IRF.  Per TCC notes on 12/21 at 7:44 \"Hep gtt & IV analgesics are a barrier\" for IRF, and per TCC note on 8:42, TCC \"Reached out to spouse to discuss post acute options pending return call.\" Noted choice obtained by TCN on 12/7 for [facility] as backup plan if IRF declines.      TCN will continue to follow and collaborate with discharge planning team as additional post acute needs arise. Thank you.    Completed:  OT/PT recommending post acute placement - 12/19  Choice obtained:   1) IRF - unclear about acceptance, see above  2) [facility]   SCP with [facility] PCP. Anticipate post acute placement, however noted patient has f/u scheduled 12/19 with multispeciality care.     "

## 2023-12-21 NOTE — CARE PLAN
The patient is Watcher - Medium risk of patient condition declining or worsening    Shift Goals  Clinical Goals: stable BP, pain management, transition to oral AC  Patient Goals: pain management  Family Goals: na    Patients BP remained stable today, in sinus rhythm. PICC placed for continuation of IV abx. Pain management with IV dilaudid and PO oxy.     Progress made toward(s) clinical / shift goals:    Problem: Fall Risk  Goal: Patient will remain free from falls  Outcome: Progressing     Problem: Knowledge Deficit - Standard  Goal: Patient and family/care givers will demonstrate understanding of plan of care, disease process/condition, diagnostic tests and medications  Outcome: Progressing     Problem: Pain - Standard  Goal: Alleviation of pain or a reduction in pain to the patient’s comfort goal  Outcome: Progressing     Problem: Skin Integrity  Goal: Skin integrity is maintained or improved  Outcome: Progressing     Problem: Hemodynamics  Goal: Patient's hemodynamics, fluid balance and neurologic status will be stable or improve  Outcome: Progressing     Problem: Fluid Volume  Goal: Fluid volume balance will be maintained  Outcome: Progressing     Problem: Urinary - Renal Perfusion  Goal: Ability to achieve and maintain adequate renal perfusion and functioning will improve  Outcome: Progressing     Problem: Respiratory  Goal: Patient will achieve/maintain optimum respiratory ventilation and gas exchange  Outcome: Progressing     Problem: Mechanical Ventilation  Goal: Safe management of artificial airway and ventilation  Outcome: Progressing  Goal: Successful weaning off mechanical ventilator, spontaneously maintains adequate gas exchange  Outcome: Progressing  Goal: Patient will be able to express needs and understand communication  Outcome: Progressing     Problem: Physical Regulation  Goal: Diagnostic test results will improve  Outcome: Progressing  Goal: Signs and symptoms of infection will  decrease  Outcome: Progressing       Patient is not progressing towards the following goals:

## 2023-12-21 NOTE — PROGRESS NOTES
HOSPITALIST NOC CROSS COVER    Notified the patient had blood pressure 82/41 with MAP 55. He had been given already a bolus of IV fluids. Second bolus was given; however, remains hypotensive in the 70s.  Dose of midodrine added    Patient is status postcardiac catheterization this evening 12/20/23.  Found to have normal-appearing epicardial coronary arteries; normal-appearing bilateral iliac and common femoral arteries; severe aortic valve insufficiency; and dilated aortic root and ascending aorta, at least 4 cm.    On 12/8 RR called for SVT.  Found to have A-fib with RVR, ultimately transferred to ICU for multiorgan failure and hypotension on 12/9.  Once stabilized patient underwent left BKA with revision of left BKA on 12/16 by Dr. Olivares.    Patient is currently on a heparin drip. He is currently asymptomatic.    He last received fentanyl, Phenylephrine and midazolam at 5:30 pm    PLAN  #Hypotension  - midodrine 10 mg x 1  - Will avoid any further fluids

## 2023-12-21 NOTE — PROGRESS NOTES
NOC HOSPITALIST CROSS COVER    Notified by RN regarding persistent hypotension despite two 500 cc boluses and a dose of midodrine 10 mg PO. His BP has been ranging in the 70s-80s systolic with MAPs in the low 50s-60s. He is asymptomatic. Discussed with cardiology and intensivist, Dr Reyes. As patient is mentating appropriately and has no signs of hypoperfusion, he is okay to remain on telemetry at this time, with a goal MAP of > 60. He was given an additional dose of midodrine.       Vitals:    12/20/23 2300   BP: (!) 80/44   Pulse:    Resp:    Temp:    SpO2:         Plan:  #Hypotension  -Persistent hypotension, but around baseline for this hospitalization without signs of hypoperfusion  -Midodrine 10 mg PO once  -Close monitoring of mentation and perfusion indicators  -May require upgrade to IMCU if hypotension becomes worse    ----------------------------------------------------------------------------------------------------------    Electronically signed by:  Shanti Barajas, MIKA, MAGNUS, MAXX-BC  Hospitalist Services

## 2023-12-21 NOTE — DISCHARGE PLANNING
Case Management Discharge Planning    Admission Date: 12/6/2023  GMLOS: 10.1  ALOS: 15    6-Clicks ADL Score: 17  6-Clicks Mobility Score: 10  PT and/or OT Eval ordered: Yes  Post-acute Referrals Ordered: Yes  Post-acute Choice Obtained: Yes  Has referral(s) been sent to post-acute provider:  Yes      Anticipated Discharge Dispo: Discharge Disposition: Disch to  rehab facility or distinct part unit (62)  Discharge Address: [address] [place] [address]   [place] [id number]    DME Needed: No    Action(s) Taken: Updated Provider/Nurse on Discharge Plan      [facility] medically cleared.     Escalations Completed: None    Medically Clear: No    Next Steps: CM will continue to assist Pt with discharge needs.      Barriers to Discharge: Medical clearance    Is the patient up for discharge tomorrow: No

## 2023-12-21 NOTE — PROGRESS NOTES
Patient back to unit from cath lab. Tele monitor placed and verified by monitor room. Post-op vitals in place, TR band to right radial CDI, no signs of bleeding or hematoma. Per cardiology, restart heparin at previous rate. All questions and concerns answered, call light in reach, plan of care ongoing.

## 2023-12-21 NOTE — PROCEDURES
Cardiac Catheterization Laboratory Procedure Note    DATE: 12/20/2023    : Hermann Barcenas MD, MPH    PROCEDURES PERFORMED:  Coronary angiography  Supravalvular aortography  Aortobifemoral angiography  Moderate conscious sedation    INDICATIONS:  Severe aortic stenosis insufficiency    CONSENT:  The complete alternatives, risks, and benefits of the procedure were explained to the patient. Informed consent was obtained prior to the procedure.    MEDICATIONS:  Lidocaine  Fentanyl  Midazolam  Nitroglycerin  Verapamil  Heparin    MODERATE CONSCIOUS SEDATION:  I personally supervised the administration of moderate conscious sedation by the nursing staff for 23 minutes.  Start time: 1703  End time: 1726    CONTRAST: Omnipaque 120 cc    RADIATION DOSE (Air Kerma): 276 mGy    FLUOROSCOPY TIME: 5.3 minutes    ACCESS:  6-Algerian Glidesheath in the right radial artery    ESTIMATED BLOOD LOSS: <10 cc    COMPLICATIONS: None    PROCEDURE IN DETAIL:  The patient was brought to the cardiac catheterization laboratory in the fasting state. The skin over the right wrist was prepped and draped in the usual sterile fashion. Lidocaine infiltration was used to anesthetize the tissue over the right radial artery. Using the micropuncture technique, a 6-Algerian Glidesheath was inserted in the right radial artery. A 5-Fr Terumo Tiger diagnostic catheter was then advanced over a standard J-wire into the aortic root.  This catheter was then used to engage the ostium of the left coronary artery and cineangiograms were obtained in multiple projections for complete evaluation of the left coronary system. This catheter was then exchanged over a J-wire to a 6 Algerian JR4 diagnostic catheter which was used to engage the ostium of the right coronary artery and cineangiograms were obtained in multiple projections for complete evaluation of the right coronary system.  Then this catheter was exchanged over a J-wire to a 4 Algerian long pigtail catheter  which was used for the aortobifemoral angiography and supravalvular aortography . following completion of coronary an aortic angiography, all wires, catheters, and sheaths were removed.  A TR band was placed over the right wrist using the patent hemostasis technique.     HEMODYNAMICS:  Aortic pressure: 85/55 mmHg    CORONARY ANGIOGRAPHY:  The left main coronary artery : Normal-appearing large caliber vessel that trifurcates to LAD, ramus intermedius and left circumflex  The left anterior descending coronary artery : Normal-appearing large in caliber transapical vessel with a large D1 branch.  The left circumflex coronary artery : Large in caliber normal-appearing vessel that gives rise to an OM.  Ramus intermedius: Large in caliber normal-appearing vessel  The right coronary artery  : High anterior takeoff, large in caliber normal-appearing dominant vessel    Supravalvular aortography:  Severe aortic valve insufficiency  Calcified aortic valve  Dilated aortic root 4 cm and ascending aorta 4cm    Aortobifemoral angiography:  Normal    IMPRESSION:  1.  Normal-appearing epicardial coronary arteries  2.  Normal-appearing bilateral iliac and common femoral arteries  3.  Severe aortic valve insufficiency  4.  Dilated aortic root and ascending aorta, at least 4cm    NOTIFICATION:  The patient's spouse  - Katiana - was called and notified of the results.  27    Referring Cardiologist - Dr. Vernon - was notified.    Hermann Barcenas MD, MPH [facility]  Interventional Cardiologist  [facility]   of Clinical Internal Medicine - [facility]

## 2023-12-21 NOTE — PROGRESS NOTES
Monitor Summary  Rhythm: SR  Rate: 66-84  Ectopy: rare PVC, rare PAC  Measurements: 0.18/0.13/0.44

## 2023-12-22 PROBLEM — S88.112A UNILATERAL COMPLETE BKA, LEFT, INITIAL ENCOUNTER (HCC): Status: ACTIVE | Noted: 2023-01-01

## 2023-12-22 NOTE — CARE PLAN
The patient is Stable - Low risk of patient condition declining or worsening    Shift Goals  Clinical Goals: Pain management, comfort  Patient Goals: pain management  Family Goals: NA    Patient continues to have pain to lower extremities. Switched to home oral pain medications. Reported nausea, gave zofran. VSS. Plan for dc to rehab.   Progress made toward(s) clinical / shift goals:    Problem: Fall Risk  Goal: Patient will remain free from falls  Outcome: Progressing     Problem: Knowledge Deficit - Standard  Goal: Patient and family/care givers will demonstrate understanding of plan of care, disease process/condition, diagnostic tests and medications  Outcome: Progressing     Problem: Pain - Standard  Goal: Alleviation of pain or a reduction in pain to the patient’s comfort goal  Outcome: Progressing     Problem: Skin Integrity  Goal: Skin integrity is maintained or improved  Outcome: Progressing     Problem: Hemodynamics  Goal: Patient's hemodynamics, fluid balance and neurologic status will be stable or improve  Outcome: Progressing     Problem: Fluid Volume  Goal: Fluid volume balance will be maintained  Outcome: Progressing     Problem: Urinary - Renal Perfusion  Goal: Ability to achieve and maintain adequate renal perfusion and functioning will improve  Outcome: Progressing     Problem: Respiratory  Goal: Patient will achieve/maintain optimum respiratory ventilation and gas exchange  Outcome: Progressing     Problem: Mechanical Ventilation  Goal: Safe management of artificial airway and ventilation  Outcome: Progressing  Goal: Successful weaning off mechanical ventilator, spontaneously maintains adequate gas exchange  Outcome: Progressing  Goal: Patient will be able to express needs and understand communication  Outcome: Progressing     Problem: Physical Regulation  Goal: Diagnostic test results will improve  Outcome: Progressing  Goal: Signs and symptoms of infection will decrease  Outcome: Progressing        Patient is not progressing towards the following goals:

## 2023-12-22 NOTE — PROGRESS NOTES
Bedside report received at 1900 and assumed care of patient. Resting in bed, and complaining of 7/10 pain and medicated per MAR. A&O x4, on 3L NC. Patient updated on plan of care.Tele monitoring in place. Educated on fall risk, all fall precautions in place. Call device within reach, bed locked and in lowest position, denied other needs at this time. Hourly rounding in place.

## 2023-12-22 NOTE — CARE PLAN
The patient is Stable - Low risk of patient condition declining or worsening    Shift Goals  Clinical Goals: Pain management, comfort  Patient Goals: pain management  Family Goals: NA    Progress made toward(s) clinical / shift goals:      Problem: Fall Risk  Goal: Patient will remain free from falls  Outcome: Progressing     Problem: Knowledge Deficit - Standard  Goal: Patient and family/care givers will demonstrate understanding of plan of care, disease process/condition, diagnostic tests and medications  Outcome: Progressing     Problem: Skin Integrity  Goal: Skin integrity is maintained or improved  Outcome: Progressing     Problem: Hemodynamics  Goal: Patient's hemodynamics, fluid balance and neurologic status will be stable or improve  Outcome: Progressing     Problem: Fluid Volume  Goal: Fluid volume balance will be maintained  Outcome: Progressing     Problem: Urinary - Renal Perfusion  Goal: Ability to achieve and maintain adequate renal perfusion and functioning will improve  Outcome: Progressing     Problem: Respiratory  Goal: Patient will achieve/maintain optimum respiratory ventilation and gas exchange  Outcome: Progressing       Patient is not progressing towards the following goals:    Requiring frequent prn medication.    Problem: Pain - Standard  Goal: Alleviation of pain or a reduction in pain to the patient’s comfort goal  Outcome: Not Progressing

## 2023-12-22 NOTE — DISCHARGE PLANNING
"HTH/SCP TCN chart review completed. Current discharge considerations are IRF pending final RIRF acceptance / final medical clearance. Noted authorization for RIRF by SCP in chart as well. Per PMR, patient is \"good candidate\" for IRF. Note per TCC/RIRF note from today @9:35AM, \"CT pending. IV analgesics are a barrier. Will need to be transitioned to PO analgesic\". Note pt medically cleared per hospitalist MD for dc to rehab per review of MD note from today. Though would also noted per hospitalist MD note from today, 12/22, \"cardiology continue to follow, patient is undergoing cardiac CT today\" and \"will stop IV pain medication as this will be a barrier for him to get discharged to rehab\" Noted choice obtained by TCN on 12/7 for [facility] as backup plan if IRF declines.       TCN will continue to follow and collaborate with discharge planning team as additional post acute needs arise. Thank you.     Completed:  PT continues to recommend post acute placement on 12/22  OT recommending post acute placement - 12/19  Choice obtained:   1) IRF - pending final acceptance, see above  2) [facility]   [facility]P with [facility] P[facility]. Anticipate post acute placement, however noted patient has f/u scheduled 12/19 with multispeciality car    "

## 2023-12-22 NOTE — PROGRESS NOTES
Telemetry Shift Summary      Rhythm: SR  HR: 80-86  Ectopy: PVC     Measurements: .17/.11/.44

## 2023-12-22 NOTE — DISCHARGE PLANNING
Case Management Discharge Planning    Admission Date: 12/6/2023  GMLOS: 10.1  ALOS: 16    6-Clicks ADL Score: 16  6-Clicks Mobility Score: 7  PT and/or OT Eval ordered: Yes  Post-acute Referrals Ordered: Yes  Post-acute Choice Obtained: Yes  Has referral(s) been sent to post-acute provider:  Yes      Anticipated Discharge Dispo: Discharge Disposition: Disch to  rehab facility or distinct part unit (62)  Discharge Address: [address] [place] [address] [place] [address]    DME Needed: No    Action(s) Taken: Updated Provider/Nurse on Discharge Plan    RN CM message [facility] yesterday though [facility] that ID placed outpatient order infusion for Ertapenem 1GM IV every 24H until 01/23/2024. Malachi replied they should be able to arrange outpatient infusion at discharge in [place].     Plan is still [facility] once medically cleared.    Escalations Completed: None    Medically Clear: No    Next Steps: CM will continue to assist Pt with discharge needs.      Barriers to Discharge: Medical clearance

## 2023-12-22 NOTE — PROGRESS NOTES
"      Orthopaedic Progress Note    Interval changes:  Patient doing well   LLE dressings changed incision without issues  Cleared for DC to rehab by ortho pending medicine clearance    ROS - Patient denies any new issues.  Pain well controlled.    /39   Pulse 70   Temp 36.6 °C (97.9 °F) (Temporal)   Resp 18   Ht 1.803 m (5' 11\")   Wt 89.5 kg (197 lb 5 oz)   SpO2 99%     Patient seen and examined  No acute distress  Breathing non labored  RRR  LLE dressings changed incision without issues, no contracture noted.    Recent Labs     12/20/23  0524 12/20/23  1350 12/21/23  0010   WBC 14.0* 12.9* 13.5*   RBC 3.39* 3.19* 2.92*   HEMOGLOBIN 9.1* 8.6* 7.8*   HEMATOCRIT 30.1* 27.9* 25.8*   MCV 88.8 87.5 88.4   MCH 26.8* 27.0 26.7*   MCHC 30.2* 30.8* 30.2*   RDW 53.0* 51.4* 53.1*   PLATELETCT 189 148* 194   MPV 10.6 11.1 10.8       Active Hospital Problems    Diagnosis     Atrial fibrillation with RVR (HCC) [I48.91]      Priority: High    Pulmonary hypertension (HCC) [I27.20]      Priority: Medium    Rheumatoid arthritis (HCC) [M06.9]      Priority: Low     On Humira      Hypotension [I95.9]     Cardiogenic shock (HCC) [R57.0]     Elevated LFTs [R79.89]     Immunosuppression (HCC) [D84.9]     Dislocation of right subtalar joint [S93.314A]     Hypokalemia [E87.6]     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) [I50.41]     Acute kidney injury (HCC) [N17.9]     S/P BKA (below knee amputation), left (HCC) [Z89.512]     Hyponatremia [E87.1]     Sepsis (HCC) [A41.9]     Aortic stenosis due to bicuspid aortic valve [Q23.0, Q23.1]        Assessment/Plan:  Patient doing well   LLE dressings changed incision without issues  Cleared for DC to rehab by ortho pending medicine clearance  POD#5 S/P Reamputation left leg below the knee   Wt bearing status - NWB LLE  Wound care/Drains - Dressings to be changed every other day by nursing. Or PRN for saturation starting POD#2  Future Procedures - none planned "   Lovenox: Start ok to start per ortho, Duration-until ambulatory > 150'  Sutures/Staples out- 14-21 days post operatively. Removal will completed by ortho mid levels only.  PT/OT-initiated  Antibiotics: Perioperative completed  DVT Prophylaxis- TEDS/SCDs/Foot pumps  [place]-not needed per ortho  Case Coordination for Discharge Planning - Disposition per therapy recs.

## 2023-12-22 NOTE — H&P
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 Hubbard is a 48-year-old male with a history of aortic stenosis, bicuspid aortic valve, mild pretension with RSVP of 50, history of gastric bypass, history of arthritis on Humira AAA stump abscess/osteomyelitis complicated with MSSA bacteremia on 10/2023 who presented on 12/6 with progressively flatfoot on the right side.  He underwent open reduction of the right subtalar joint dislocation, right talonavicular joint dislocation right foot triple arthrodesis, right extensor tendon lengthening and application of multiplanar external fixator on the right lower extremity on 12/6.  Per orthopedic surgery patient is nonweightbearing to toe-touch weightbearing for transfers on the right lower extremity.  Ex-Fix is to be removed after 8 to 12 weeks.    On 12/8 his hospitalization was complicated by atrial fibrillation with RVR.  Echo at that time showed ejection fraction of 45% with severe aortic stenosis and moderate aortic insufficiency as well as moderate to severe tricuspid regurg and RSVP of 66.  Cardiology was consulted and is recommending a TAVR in the next month.  He underwent a cardiac catheterization on 12/20.  He was not on any beta-blockers ACE inhibitor's or ARB's considering his hypotension.    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  Orthopedic surgery is requesting that he remain nonweightbearing on the left  lower extremity.  Intraoperative cultures grew Serratia marcescens he was initially started on ceftriaxone and Zyvox until 12/13 then changed to cefepime on 12/13.  Infectious disease was consulted and recommended a total of 6 weeks of IV antibiotics with transition to Invanz upon discharge from [facility] ending on 1/23/2024.  While on IV antibiotics patient needs CBC with differential and CMP every week.    Review of Systems:       Gen: No fatigue, confusion, significant weight loss  Eyes: no blurry vision, double vision or pain in eyes  ENT: no changes in hearing, runny nose, nose bleeds, sinus pain  CV: No CP, palpitations,   Lungs: + shortness of breath, no changes in secretions, changes in cough, pain with coughing  Abd: No abd pain, nausea, vomiting, pain with eating  : no blood in urine, pain during storage phase, bladder spasms, suprapubic pain  Ext: Pain in both legs  Neuro: no changes in strength or sensation  Skin: no new ulcers/skin breakdown appreciated by patient  Mood: No changes in mood today, increase in depression or anxiety  Heme: no bruising, or bleeding        Past Medical History:  Past Medical History:   Diagnosis Date    ADD (attention deficit disorder)     Alcohol abuse     Allergic rhinitis 05/21/2009    Anemia 09/2019    Anxiety 05/21/2009    Apnea, sleep     Arrhythmia     afib when hospitaized for infection    Back pain 05/21/2009    Bipolar disorder (HCC)     Bleeding ulcer 05/21/2009    Bleeding ulcer 05/21/2009    History of anemia-now on nexium     Bowel habit changes     constipation related to narcotics    Breath shortness     Congenital anomaly of mitral valve     Patient reports he only has 3 heart valves    Congestive heart failure (HCC)     Daytime sleepiness     Dental disorder     Missing teeth    Depression 05/21/2009    Eczema 05/21/2009    GERD (gastroesophageal reflux disease)     Heart burn     on meds    Heart murmur     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    Hemorrhagic  disorder ([facility])     hx of bleeding ulcers    History of bleeding ulcers 02/12/2015    Hypertension 05/03/2021 12/7/23:  Patient states he has Never had high blood pressure. pt states well controlled on meds    Insomnia 05/21/2009    Intestinal hernia     Migraine 05/21/2009    Morning headache     Obesity, morbid (Former[facility]) 05/21/2009    Osteomyelitis ([facility]) 10/07/2019    Pain 09/2019    Chronic knee and back pain    Pneumonia     12/4/23:  Last time was around 1 year ago.    Pubic ramus fracture (Former[facility]) 09/28/2019    Rheumatoid arteritis ([facility]) 09/2019    knee, feet right hand    Sleep apnea 05/21/2009    Did not tolerate cpap, does not use it12/4/23:  Resolved with weight loss.    Snoring        Past Surgical History:  Past Surgical History:   Procedure Laterality Date    KNEE AMPUTATION BELOW Left 12/16/2023    Procedure: debridement, secondary closure of left revision amputation;  Surgeon: You Olivares M.D.;  Location: [facility];  Service: Orthopedics    ORIF, ANKLE Right 12/6/2023    Procedure: RIGHT SUBTALAR DISLOCATION OPEN REDUCTION INTERNAL FIXATION, TALONAVICULAR OPEN REDUCTION INTERNAL FIXATION, ACHILLES TENDON LENGTHENING, TRIPLE ARTHRODESIS, RIGHT LOWER EXTREMITY APPLICATION OF MULTIPLANAR EXTERNAL FIXATOR;  Surgeon: Calvin Pryor M.D.;  Location: [facility];  Service: Orthopedics    INCISION AND DRAINAGE ORTHOPEDIC Left 10/22/2023    Procedure: REVISION, BELOW KNEE AMPUTATION;  Surgeon: River Colindres M.D.;  Location: [facility];  Service: Orthopedics    IRRIGATION & DEBRIDEMENT GENERAL Left 10/19/2023    Procedure: IRRIGATION AND DEBRIDEMENT, WOUND;  Surgeon: Jay Roe M.D.;  Location: [facility];  Service: Trauma    KNEE AMPUTATION BELOW Left 03/09/2023    Procedure: AMPUTATION, BELOW KNEE;  Surgeon: Wayne Chambers M.D.;  Location: [facility];  Service: Orthopedics    PB TOTAL KNEE ARTHROPLASTY Right 07/22/2020    Procedure:  ARTHROPLASTY, KNEE, TOTAL;  Surgeon: Temo Pires M.D.;  Location: SURGERY [facility];  Service: Orthopedics    TENDON TRANSFER Right 01/22/2020    Procedure: RIGHT DISTAL ULNA RESECTION AND EXTENSOR TENDON TRANSFER;  Surgeon: Marine Rushing M.D.;  Location: SURGERY [facility];  Service: Orthopedics    OTHER ORTHOPEDIC SURGERY Right 2020    total right knee replacement    IRRIGATION & DEBRIDEMENT ORTHO Left 10/09/2019    Procedure: IRRIGATION AND DEBRIDEMENT, WOUND - PELVIC OSTEOMYELITIS;  Surgeon: You Olivares M.D.;  Location: SURGERY [facility];  Service: Orthopedics    OTHER SURGICAL PROCEDURE  2019    osteomelitis of pelvis cleaned    GASTRIC BYPASS LAPAROSCOPIC  2000    Lucila en y    CHOLECYSTECTOMY  2000    OTHER SURGICAL PROCEDURE      repair of broken penis.  12/4/23:  Patient denies any surgery on penis.       Family History:  Family History   Problem Relation Age of Onset    Hypertension Mother     Arthritis Mother     Depression Mother     Cancer Father         bladder    Schizophrenia Father     Cancer Maternal Grandmother         breast    Heart Disease Maternal Grandmother     Psychiatric Illness Other     Stroke Maternal Aunt     Other Neg Hx         no connective tissue disorders or known aortic disease       Medications:  No current facility-administered medications for this encounter.     No current outpatient medications on file.     Facility-Administered Medications Ordered in Other Encounters   Medication Dose    [Held by provider] traMADol (Ultram) 50 MG tablet 50 mg  50 mg    morphine ER (Ms Contin) tablet 15 mg  15 mg    morphine (MS IR) tablet 30 mg  30 mg    apixaban (Eliquis) tablet 5 mg  5 mg    cefepime (Maxipime) 2 g in  mL IVPB  2 g    ondansetron (Zofran) syringe/vial injection 4 mg  4 mg    dronabinol (Marinol) capsule 2.5 mg  2.5 mg    miconazole (Micotin) 2 % cream      acetaminophen (Tylenol) tablet 650 mg  650 mg    dapagliflozin propanediol  (Farxiga) tablet 10 mg  10 mg    lactulose 20 GM/30ML solution 30 mL  30 mL    spironolactone (Aldactone) tablet 25 mg  25 mg    senna-docusate (Pericolace Or Senokot S) 8.6-50 MG per tablet 2 Tablet  2 Tablet    And    polyethylene glycol/lytes (Miralax) Packet 1 Packet  1 Packet    And    magnesium hydroxide (Milk Of Magnesia) suspension 30 mL  30 mL    And    bisacodyl (Dulcolax) suppository 10 mg  10 mg    albuterol (Proventil) 2.5mg/0.5ml nebulizer solution 2.5 mg  2.5 mg    omeprazole (PriLOSEC) capsule 20 mg  20 mg    DULoxetine (Cymbalta) capsule 60 mg  60 mg       Allergies:  Nsaids, Diphenhydramine, Mirtazapine, Penicillins, and Promethazine    Psychosocial History:  Living Site:  Home  Living With: Spouse, child less than 18 years of age  Caregiver's availability:   Spouse      Level of Function Prior to Disability:  Independent at home level    Level of Function Prior to Admission to [facility]:    Balance   Sitting Balance (Static) Fair +   Sitting Balance (Dynamic) Fair   Weight Shift Sitting Fair   Skilled Intervention Verbal Cuing;Compensatory Strategies;Sequencing   Comments no LOB sitting EOB   Bed Mobility    Supine to Sit Minimal Assist  (for RLE OOB)   Sit to Supine Moderate Assist   Scooting Minimal Assist   Skilled Intervention Verbal Cuing;Compensatory Strategies;Sequencing;Facilitation   Comments used bed features   Gait Analysis   Gait Level Of Assist Unable to Participate   Functional Mobility   Sit to Stand Unable to Participate   Bed, Chair, Wheelchair Transfer    (deferred given patient pending imaging following session)     Bed Mobility    Supine to Sit Moderate Assist  (HOB elevated)   Sit to Supine Moderate Assist   Scooting Minimal Assist  (seated)   Rolling Moderate Assist to Lt.;Minimal Assist to Rt.  (support to R ex-fix)   Activities of Daily Living   Grooming Modified Independent  (oral care in WC)   Toileting    ([place], no BM)   Functional Mobility   Sit to  Stand Unable to Participate   Bed, Chair, Wheelchair Transfer Moderate Assist   Transfer Method Stand Step  (to/from WC)   Mobility Supine > < EOB, slide board to/from WC     CURRENT LEVEL OF FUNCTION:   Same as level of function prior to admission to [facility]    Physical Examination:     VITAL SIGNS:   vitals were not taken for this visit.   GENERAL: No apparent distress  HEENT: Normocephalic/atraumatic, EOMI, PERRL, and No nystagmus  CARDIAC: Regular rate and rhythm, normal S1, S2   LUNGS: Clear to auscultation   ABDOMINAL: bowel sounds present, soft, nontender, and nondistended , abdominal hernia present, premorbid  EXTREMITIES: Right BKA incision is clean dry and intact sutures present multiple incision  Right lower extremity Ex-Fix present pin sites are clean dry and intact no erythema.    NEURO:  Mental status:  A&Ox4 (person, place, date, situation) answers questions appropriately  Speech: fluent, no aphasia or dysarthria  CRANIAL NERVES:  2,3: visual acuity grossly intact, PERRL  3,4,6: EOMI bilaterally, no nystagmus or diplopia  7: no facial asymmetry  8: hearing grossly intact  9,10: symmetric palate elevation  11: SCM/Trapezius strength 5/5 bilaterally  12: tongue protrudes midline          Sensory: intact to light touch through out    Negative babinski b/l  Negative Vaz b/l     Radiology:    Complete foot x-rays 12/20:  IMPRESSION:     1.  ORIF with extensive hardware consistent with known history of triple arthrodesis.  2.  External fixator hardware is also present.        Laboratory Values:  Recent Labs     12/20/23  1350 12/21/23  0010 12/22/23  0137   SODIUM 130* 129* 129*   POTASSIUM 3.9 3.6 3.8   CHLORIDE 96 96 99   CO2 21 22 19*   GLUCOSE 108* 91 81   BUN 20 22 19   CREATININE 0.81 0.91 0.72   CALCIUM 7.6* 7.4* 8.0*     Recent Labs     12/20/23  1350 12/21/23  0010 12/22/23  0137   WBC 12.9* 13.5* 12.2*   RBC 3.19* 2.92* 3.01*   HEMOGLOBIN 8.6* 7.8* 8.0*   HEMATOCRIT  27.9* 25.8* 26.5*   MCV 87.5 88.4 88.0   MCH 27.0 26.7* 26.6*   MCHC 30.8* 30.2* 30.2*   RDW 51.4* 53.1* 53.6*   PLATELETCT 148* 194 210   MPV 11.1 10.8 10.7     Recent Labs     12/20/23  1350 12/20/23 2028   APTT 27.1 37.2*   INR 1.31* 1.28*       Primary Rehabilitation Diagnosis:    This patient is a 48 y.o. male admitted for acute inpatient rehabilitation with No Principal Problem: There is no principal problem currently on the Problem List. Please update the Problem List and refresh..    Impairments:   ADLs/IADLs  Mobility    Secondary Diagnosis/Medical Co-morbidities Affecting Function    Right lower extremity external fixator  severe aortic stenosis  moderate aortic insufficiency  moderate to severe tricuspid regurg and RSVP of 66  Rheumatoid arthritis on Humira   stump abscess/osteomyelitis complicated with MSSA bacteremia on 10/2023  Chronic pain  Acute kidney injury  Leukocytosis  Hyponatremia  Respiratory failure with hypoxia  Incontinence associated dermatitis and fungal infection over the sacrum  Impaired ADLs and mobility    Relevant Changes Since Preadmission Evaluation:    Status unchanged    The patient's rehabilitation potential is Very Good  The patient's medical prognosis is good    Rehabilitation Plan:   Discussion and Recommendations:   1. The patient requires an acute inpatient rehabilitation program with a coordinated program of care at an intensity and frequency not available at a lower level of care. This recommendation is substantiated by the patient's medical physicians who recommend that the patient's intervention and assessment of medical issues needs to be done at an acute level of care for patient's safety and maximum outcome.   2. A coordinated program of care will be supplied by an interdisciplinary team of physical therapy, occupational therapy, rehab physician, rehab nursing, and, if needed, speech therapy and rehab psychology. Rehab team presents a patient-specific rehabilitation  and education program concentrating on prevention of future problems related to accessibility, mobility, skin, bowel, bladder, sexuality, and psychosocial and medical/surgical problems.   3. Need for Rehabilitation Physician: The rehab physician will be evaluating the patient on a multi-weekly basis to help coordinate the program of care. The rehab physician communicates between medical physicians, therapists, and nurses to maximize the patient's potential outcome. Specific areas in which the rehab physician will be providing daily assessment include the following:   A. Assessing the patient's heart rate and blood pressure response (vitals monitoring) to activity and making adjustments in medications or conservative measures as needed.   B. The rehab physician will be assessing the frequency at which the program can be increased to allow the patient to reach optimal functional outcome.   C. The rehab physician will also provide assessments in daily skin care, especially in light of patient's impairments in mobility.   D. The rehab physician will provide special expertise in understanding how to work with functional impairment and recommend appropriate interventions, compensatory techniques, and education that will facilitate the patient's outcome.   4. Rehab R.N.   The rehab RN will be working with patient to carry over in room mobility and activities of daily living when the patient is not in 3 hours of skilled therapy. Rehab nursing will be working in conjunction with rehab physician to address all the medical issues above and continue to assess laboratory work and discuss abnormalities with the treating physicians, assess vitals, and response to activity, and discuss and report abnormalities with the rehab physician. Rehab RN will also continue daily skin care, supervise bladder/bowel program, instruct in medication administration, and ensure patient safety.   5. Rehab Therapy: Therapies to treat at intensity and  frequency of (may change after completion of evaluation by all therapeutic disciplines):       PT:  Physical therapy to address mobility, transfer, gait training and evaluation for adaptive equipment needs 1.5 hour/day at least 5 days/week for the duration of the ELOS (see below)       OT:  Occupational therapy to address ADLs, self-care, home management training, functional mobility/transfers and assistive device evaluation, and community re-integration 1.5 hour/day at least 5 days/week for the duration of the ELOS (see below).       Medical management / Rehabilitation Issues/ Adverse Potential as part of rehabilitation plan     Rehabilitation Issues/Adverse Potential    1.  Left BKA revision: Patient demonstrates functional deficits in  strength, balance, coordination, and ADL's. The patient requires therapy to correct or compensate for these deficits prior to discharge. Patient is admitted to [facility] for comprehensive rehabilitation therapy, including  physical, occupational therapy.     Rehabilitation nursing monitors bowel and bladder control, educates on medication administration, co-morbidities and monitors patient safety.    2.  Neurostimulants: None at this time but continue to assess daily for need to initiate should status change.    3.  DVT prophylaxis:  Patient is on apixaban 5 mg twice daily for anticoagulation upon transfer. Encourage OOB. Monitor daily for signs and symptoms of DVT including but not limited to swelling and pain to prevent the development of DVT that may interfere with therapies.    4.  GI prophylaxis:  On prilosec to prevent gastritis/dyspepsia which may interfere with therapies.    5.  Pain: Controlled on below regimen    6.  Nutrition/Dysphagia: Dietician monitors nutrient intake, recommend supplements prn and provide nutrition education to pt/family to promote optimal nutrition for wound healing/recovery.     7.  Bladder/bowel:  Start bowel and bladder  program as described below, to prevent constipation, urinary retention (which may lead to UTI), and urinary incontinence (which will impact upon pt's functional independence).   - TV Q3h while awake with post void bladder scans, I&O cath for PVRs >400  - up to commode after meal     8.  Skin/dermal ulcer prophylaxis: Monitor for new skin conditions with q.2 h. turns as required to prevent the development of skin breakdown.     9.  Cognition/Behavior: As needed psychologist provides adjustment counseling to illness and psychosocial barriers that may be potential barriers to rehabilitation.     10. Respiratory therapy: RT performs O2 management prn, breathing retraining, pulmonary hygiene and bronchospasm management prn to optimize participation in therapies.     Medical Co-Morbidities/Adverse Potential Affecting Function:  Left residual limb infection and osteomyelitis requiring BKA revision:  -PT and OT for mobility and ADLs. Per guidelines, 15 hours per week between PT, OT.  -Follow-up orthopedic surgery  -Nonweightbearing left lower extremity  -Dressing changes daily    Multiple right lower extremity surgical interventions: open reduction of the right subtalar joint dislocation, right talonavicular joint dislocation right foot triple arthrodesis, right extensor tendon lengthening and application of multiplanar external fixator on the right lower extremity on 12/6.   -Right lower extremity external fixator, pain management as per nursing protocol  - Expected removal of Ex-Fix in 8 to 12 weeks  -Toe-touch weightbearing on right lower extremity for transfers only    CV: CHF, severe aortic stenosis, moderate aortic insufficiency, moderate to severe tricuspid regurg and RSVP of 66  -Cardiology was consulted and recommending TAVR in the next month.  -Will hold beta-blocker/ACE/ARB given hypotension  -Follow-up with cardiology as outpatient    Rheumatoid arthritis on Humira   -Will discuss with patient about bringing  home medication and regular administration.    stump abscess/osteomyelitis complicated with MSSA bacteremia: Previous abscess and osteomyelitis 10/2023, revision as above 12/16  - Cefepime 2 g IV every 8 hours  -Transition to ertapenem on discharge until 1/23  -CBC with differential and CMP weekly  - Follow-up with ID as outpatient    Chronic pain:  -MS Contin 15 mg every 12 hours  -Morphine IR 30 mg p.o. every 6 hours as needed moderate to severe pain  - Cymbalta    Postoperative pain:  - Tylenol 1000 mg 3 times daily, given high-dose Tylenol will check LFTs in a.m.  - Morphine IR 30 mg every 6 hours    Leukocytosis: WBC of 12.2 on 12/22  - Check CBC in a.m.    Hyponatremia: Sodium 129 on 12/22  -Check CMP in a.m.    Respiratory failure with hypoxia  -Consult respiratory therapy  - Supplemental oxygen as per guidelines  - Check chest x-ray    Incontinence associated dermatitis and fungal infection over the sacrum  -Miconazole twice daily  -Maintain dry surface, monitor for incontinence  - Turn every 2 hours    Malnutrition: BMI 27.46, with albumin of 2.5  - Dronabinol 2.5 mg twice daily        Skin - Patient at risk for skin breakdown due to debility in areas including sacrum, achilles, elbows and head in addition to other sites. Nursing to assess skin daily.     GI Ppx - Patient on Prilosec for GERD prophylaxis. Patient on Senna-docusate for constipation prophylaxis.     DVT Ppx - Patient apixaban 5 mg every 12 hours on transfer.    I personally performed a complete drug regimen review and no potential clinically significant medication issues were identified.     Goals/Expected Level of Function Based on Current Medical and Functional Status:  (may change based on patient's medical status and rate of impairment recovery)  Transfers:   Supervision  Mobility/Gait:   Supervision  ADL's:   Supervision    DISPOSITION: Discharge to pre-morbid independent living setting with the supportive care of patient's  spouse.    ELOS: 10-14 days  ____________________________________    Dr. Alexx Khan DO  [facility] - Physical Medicine & Rehabilitation   [facility] - Spinal Cord Injury Medicine   ____________________________________    Pt was seen today for 78 min, and entire time spent in face-to-face contact was >50% in counseling and coordination of care as detailed in A/P above.

## 2023-12-22 NOTE — DISCHARGE PLANNING
CT pending.  IV analgesics are a barrier. Will need to be transitioned to PO analgesics.  Dr. Dalton is aware.     1437- has medically cleared.  Case is under review by Administration.     1640-Dr. Khan has accepted.  Emilie STILES is aware. I do apologize for the delay.

## 2023-12-22 NOTE — PROGRESS NOTES
"Hospital Medicine Daily Progress Note    Date of Service  12/22/2023    Chief Complaint  Richard Hubbard II is a 48 y.o. male admitted 12/6/2023 for \"an internal fixation of the right subtalar dislocation    Hospital Course    This is a 48-year-old male with a past medical history significant for moderate AS, bicuspid aortic valve, mildly to moderate pulmonary hypertension with RSVP of 50, history of gastric bypass, history of rheumatoid arthritis on Humira, left BKA stump abscess/osteomyelitis complicated with MSSA bacteremia on 10/23. Patient was supposed to be on Ancef daily 12/1 and continue Augmentin 875/125 1 tablet p.o. twice daily for 2 weeks per ID recs    He was admitted on 12/6 by orthopedic surgery and he was noted to have progressive flatfoot on the right side.  He underwent open reduction of right subtalar joint dislocation, right talonavicular joint dislocation right foot triple arthrodesis, right extensor tendon lengthening and application of multiplanar external fixator right lower extremity on 12/6.  PT/OT has evaluate the patient, recommend postacute, podiatry stated that patient is a good candidate for acute rehab     Per orthopedic surgery, patient will be nonweightbearing to toe-touch weightbearing for transfer on the right lower extremity.  12/8, Response was called, found to be in A-fib with RVR.      Of note patient was noted to be in cardiogenic shock on pressures, echocardiogram showed EF 45%, severely AAS, moderate AI, moderate to severe TR, RSVP 66.  Cardiology was consulted, plan for TAVR in the next month.  Patient underwent cardiac cath on 12/20.  Currently patient is not on any BB/ACE/ARB considering him being hypotensive.  Cardiology plans to do TAVR next month.    Of note patient has infected left BKA wound, he underwent Reamputation of left leg below the knee, patient is nonweightbearing on the left lower extremity.  Will need PT/OT, DVT prophylaxis Multimodal pain " management.  Intraoperative culture growing Serratia marcescens.  Patient was started on ceftriaxone plus Zyvox since 12/9-12/13 and changed to Cefepime on 12/13.  ID was consulted, recommended patient will need 6 weeks of IV Invanz, last day being 1/23/2024.  While patient was on IV antibiotics, needs CBC with differential and CMP every week.    At this time patient is medically stable to be discharged to acute rehab    Interval events:  --- No acute events overnight, vital sign has been stable, patient is alert, awake and answering questions appropriately, he is on 3 L of oxygen  --Patient has infected left BKA wound, culture growing Serratia, ID was consulted, patient will need 6 weeks of IV antibiotics.  Will obtain PICC line  -- Cardiology continue to follow the patient, currently patient is in sinus rhythm and maintaining it.  Heparin was discontinued, will continue Eliquis 5 mg p.o. twice daily.  -Currently we are holding beta-blocker, ACE/ARB secondary to hypotension.-  Plan for TAVR next month.    12/22:  -- No acute events overnight, vital sign has been stable, patient is currently requiring 4 L of oxygen.  His blood pressure is on the lower side but stable  -- Patient is still hyponatremic with a sodium of 129, alert and oriented, monitor  -- WBC trending down at 12  -- Cardiology continue to follow, patient is undergoing cardiac CT today; will continue Eliquis, hold beta-blockers/ACE/ARB considering him being hypotensive.  -- Patient has severe aortic stenosis, will need TAVR in the next month as an outpatient  -- ID continue to follow the patient patient will need 6 weeks of antibiotics.  While him being in the hospital continues to be fine; upon discharge we will continue Invanz    Patient stated that he continues to have pain, will stop IV pain medication as this will be a barrier for him to get discharged to rehab    I have discussed this patient's plan of care and discharge plan at IDT rounds today  with Case Management, Nursing, Nursing leadership, and other members of the IDT team.    Consultants/Specialty  cardiology, critical care, and orthopedics    Code Status  Full Code    Disposition  The patient is not medically cleared for discharge to home or a post-acute facility.  Anticipate discharge to: an inpatient rehabilitation hospital    I have placed the appropriate orders for post-discharge needs.    Review of Systems  Review of Systems   Constitutional:  Positive for malaise/fatigue. Negative for fever.   HENT:  Negative for hearing loss.    Eyes:  Negative for blurred vision.   Respiratory:  Negative for cough, sputum production and shortness of breath.    Cardiovascular:  Negative for chest pain and leg swelling.   Gastrointestinal:  Negative for abdominal pain and nausea.   Genitourinary:  Negative for dysuria and urgency.   Musculoskeletal:  Positive for joint pain and myalgias. Negative for back pain and neck pain.   Neurological:  Negative for dizziness, sensory change and focal weakness.   Psychiatric/Behavioral:  Negative for substance abuse.    All other systems reviewed and are negative.       Physical Exam  Temp:  [36 °C (96.8 °F)-36.7 °C (98.1 °F)] 36.2 °C (97.2 °F)  Pulse:  [70-92] 87  Resp:  [18-20] 20  BP: ()/(39-69) 101/57  SpO2:  [89 %-99 %] 95 %    Physical Exam  Vitals reviewed.   HENT:      Head: Normocephalic and atraumatic. No contusion.   Eyes:      Pupils: Pupils are equal, round, and reactive to light.   Cardiovascular:      Rate and Rhythm: Normal rate and regular rhythm.   Pulmonary:      Effort: Pulmonary effort is normal.      Breath sounds: No wheezing or rhonchi.   Abdominal:      General: Bowel sounds are normal. There is no distension.      Palpations: Abdomen is soft.      Tenderness: There is no abdominal tenderness. There is no rebound.   Musculoskeletal:         General: Normal range of motion.      Cervical back: No rigidity. No muscular tenderness.       Comments: Left BKA  External fixator on right   Skin:     General: Skin is warm.   Neurological:      General: No focal deficit present.      Mental Status: He is alert.      Cranial Nerves: No cranial nerve deficit.      Sensory: No sensory deficit.   Psychiatric:         Mood and Affect: Mood normal.         Fluids    Intake/Output Summary (Last 24 hours) at 12/22/2023 1005  Last data filed at 12/22/2023 0657  Gross per 24 hour   Intake 460 ml   Output 1150 ml   Net -690 ml         Laboratory  Recent Labs     12/20/23  1350 12/21/23  0010 12/22/23  0137   WBC 12.9* 13.5* 12.2*   RBC 3.19* 2.92* 3.01*   HEMOGLOBIN 8.6* 7.8* 8.0*   HEMATOCRIT 27.9* 25.8* 26.5*   MCV 87.5 88.4 88.0   MCH 27.0 26.7* 26.6*   MCHC 30.8* 30.2* 30.2*   RDW 51.4* 53.1* 53.6*   PLATELETCT 148* 194 210   MPV 11.1 10.8 10.7       Recent Labs     12/20/23  1350 12/21/23  0010 12/22/23  0137   SODIUM 130* 129* 129*   POTASSIUM 3.9 3.6 3.8   CHLORIDE 96 96 99   CO2 21 22 19*   GLUCOSE 108* 91 81   BUN 20 22 19   CREATININE 0.81 0.91 0.72   CALCIUM 7.6* 7.4* 8.0*       Recent Labs     12/20/23  1350 12/20/23 2028   APTT 27.1 37.2*   INR 1.31* 1.28*                 Imaging  IR-PICC LINE PLACEMENT W/ GUIDANCE > AGE 5   Final Result                  Ultrasound-guided PICC placement performed by qualified nursing staff as    above.          DX-FOOT-COMPLETE 3+ RIGHT   Final Result      1.  ORIF with extensive hardware consistent with known history of triple arthrodesis.   2.  External fixator hardware is also present.      DX-CHEST-PORTABLE (1 VIEW)   Final Result         1.  Pulmonary edema and/or infiltrates are identified, which are stable since the prior exam.   2.  Trace bilateral pleural effusions   3.  Cardiomegaly      DX-CHEST-PORTABLE (1 VIEW)   Final Result         1.  Coronary edema and/or infiltrates, stable since prior study.   2.  Trace right pleural effusion, stable   3.  Cardiomegaly      US-EXTREMITY VENOUS LOWER BILAT   Final  Result      EC-ECHOCARDIOGRAM COMPLETE W/ CONT   Final Result      US-RUQ   Final Result         1. Hepatic steatosis.   2. Otherwise, normal.      DX-CHEST-PORTABLE (1 VIEW)   Final Result         1. [place] catheter tip is located within a right lower lobe pulmonary arterial branch, repositioning may be indicated.   2. Stable pulmonary edema, cardiomegaly, and small pleural effusions.      DX-CHEST-PORTABLE (1 VIEW)   Final Result      1.  Small right pleural effusion with adjacent airspace disease.   2.  Worsening interstitial infiltrates and/or edema.      DX-CHEST-LIMITED (1 VIEW)   Final Result      1.  Enlarged cardiac silhouette with changes of vascular congestion.      DX-PORTABLE FLUORO > 1 HOUR   Final Result      Portable fluoroscopy utilized for 1 minute 48 seconds.         INTERPRETING LOCATION: [address], [place], [address]      DX-FOOT-COMPLETE 3+ RIGHT   Final Result      Digitized intraoperative radiograph is submitted for review. This examination is not for diagnostic purpose but for guidance during a surgical procedure. Please see the patient's chart for full procedural details.         INTERPRETING LOCATION: [address] , [place], [address]      CL-LEFT HEART CATHETERIZATION WITH POSSIBLE INTERVENTION    (Results Pending)   CT-TAVR CHEST-ABD-PELVIS W/WO POST PROCESS    (Results Pending)        Assessment/Plan  * Cardiogenic shock (HCC)  Assessment & Plan  Admitted to the ICU 12/09/23 with suspected BiV failure    - Continue to monitor while off pressors  - IV lasix changed to PO torsemide  - Goal SBP > 90    Now reslved    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)- (present on admission)  Assessment & Plan  Last echo from October 20, 2023 with EF 55%, grade 1 diastolic dysfunction with moderate AAS/AI and RVSP of 45 mmHg --> now in biventricular failure as seen on POCUS per Dr. Gonda 12/9    - Repeat echo 12/9 with EF 45% and severe AS  Cardiology has been following him   I/Os  daily, cardiac diet    S/P BKA (below knee amputation), left ([facility])- (present on admission)  Assessment & Plan  With infected stump  -- Orthopedic surgery was consulted, patient underwent reimplantation of the left BKA wound.  Intraoperative culture growing Serratia  ID consulted, will need IV antibiotics for 6 weeks; last day being 1/23/2024      Hypotension  Assessment & Plan  Blood pressure on the lower side, monitor  Holding ACE, ARB, beta-blocker    Hyponatremia- (present on admission)  Assessment & Plan  At 129, monitor    Sepsis ([facility])- (present on admission)  Assessment & Plan  Not POA  Resolved   - Linezolid + ceftriaxone changed to Cefepime  Patient underwent I&D on 12/17, culture growing Serratia, ID consulted, continue cefepime, will need 6 weeks of IV antibiotics while him being in the hospital, will switch to invanz upon dc    Aortic stenosis due to bicuspid aortic valve- (present on admission)  Assessment & Plan    Moderate on last echo in October 2023  - Repeat echo revealing severe AS  Cardiology has been following and  planning for TAVR    Hx of gastric bypass  Assessment & Plan  Encourage ongoing weight loss management    Atrial fibrillation with RVR ([facility])- (present on admission)  Assessment & Plan  History of A-fib, went into AVR 12/8, converted back to normal sinus rhythm 12/9, now back in afib 12/10    On 12/21:  --Noted to be in Sinus rhythm, started on Eliquis 5 mg twice daily  --follow cards recs      Elevated LFTs  Assessment & Plan      - Improving  - Maintain adequate perfusion  - RUQ US reassuring      Immunosuppression ([facility])  Assessment & Plan  Holding Humira for now given infected left lower extremity    Dislocation of right subtalar joint  Assessment & Plan  He underwent open reduction of right subtalar joint dislocation, right talonavicular joint dislocation right foot triple arthrodesis, right extensor tendon lengthening and application of multiplanar external fixator right lower  extremity on 12/6 by ortho  Per orthopedic surgery, patient will be nonweightbearing to toe-touch weightbearing for transfer on the right lower extremity  - PT/OT as tolerated  - DVTppx with Lovenox  - Pain regimen  - Wound care    Hypokalemia- (present on admission)  Assessment & Plan  Replete as needed    Acute kidney injury (HCC)- (present on admission)  Assessment & Plan  Now RESOLVED  Secondary to cardiogenic shock/ATN     Rheumatoid arthritis (HCC)- (present on admission)  Assessment & Plan  As needed analgesics  Holding immunosuppressants for now in the setting of infection      I  VTE prophylaxis: eliquis  I have performed a physical exam and reviewed and updated ROS and Plan today (12/22/2023). In review of yesterday's note (12/21/2023), there are no changes except as documented above.

## 2023-12-22 NOTE — PREADMISSION SCREENING NOTE
"  Pre-Admission Screening Form    Patient Information:   Name: Richard Hubbard II     MRN: [id number]       : 1975      Age: 48 y.o.   Gender: male      Race: White [7]       Marital Status:  [2]  Family Contact: Katiana Hubbard Linda        Relationship: Spouse [17]  Mother [8]  Home Phone:   [phone]           Cell Phone: [phone]  Advanced Directives: None  Code Status:  FULL  Current Attending Provider: Gil Dalton M.D.  Referring Physician: Dr. Donovan  Physiatrist Consult: Dr. Pascual   Referral Date: 23  Primary Payor Source:  [facility]  Secondary Payor Source:      Medical Information:   Date of Admission to Acute Care [place]2023  Room Number: T730/01  Rehabilitation Diagnosis: 0008.9 - Orthopaedic Disorders: Other Orthopaedic  Immunization History   Administered Date(s) Administered    Influenza Vaccine Quad Inj (Pf) 2017, 2018, 10/17/2019, 2020, 2021, 2023    Influenza Vaccine Quad Inj (Preserved) 2015, 2016    Influenza, Unspecified - HISTORICAL DATA 2020    MODERNA SARS-COV-2 VACCINE (12+) 2021, 2021, 2021    Tdap Vaccine 2020     Allergies   Allergen Reactions    Nsaids      Bleeding, \"I had a bleeding ulcer\"    Diphenhydramine Unspecified     \"I get agitated\"    Mirtazapine Unspecified     I had a hard time waking up, lacking mental focus.     Penicillins      \"Had some dizziness\"  Tolerated Unasyn 10/2019  Tolerated Zosyn 2023    Promethazine Unspecified     \"I get very agitated\"     Past Medical History:   Diagnosis Date    ADD (attention deficit disorder)     Alcohol abuse     Allergic rhinitis 2009    Anemia 2019    Anxiety 2009    Apnea, sleep     Arrhythmia     afib when hospitaized for infection    Back pain 2009    Bipolar disorder (HCC)     Bleeding ulcer 2009    Bleeding ulcer 2009    History of " anemia-now on nexium     Bowel habit changes     constipation related to narcotics    Breath shortness     Congenital anomaly of mitral valve     Patient reports he only has 3 heart valves    Congestive heart failure (HCC)     Daytime sleepiness     Dental disorder     Missing teeth    Depression 05/21/2009    Eczema 05/21/2009    GERD (gastroesophageal reflux disease)     Heart burn     on meds    Heart murmur     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    Hemorrhagic disorder (HCC)     hx of bleeding ulcers    History of bleeding ulcers 02/12/2015    Hypertension 05/03/2021 12/7/23:  Patient states he has Never had high blood pressure. pt states well controlled on meds    Insomnia 05/21/2009    Intestinal hernia     Migraine 05/21/2009    Morning headache     Obesity, morbid ([facility]) 05/21/2009    Osteomyelitis ([facility]) 10/07/2019    Pain 09/2019    Chronic knee and back pain    Pneumonia     12/4/23:  Last time was around 1 year ago.    Pubic ramus fracture ([facility]) 09/28/2019    Rheumatoid arteritis ([facility]) 09/2019    knee, feet right hand    Sleep apnea 05/21/2009    Did not tolerate cpap, does not use it12/4/23:  Resolved with weight loss.    Snoring      Past Surgical History:   Procedure Laterality Date    KNEE AMPUTATION BELOW Left 12/16/2023    Procedure: debridement, secondary closure of left revision amputation;  Surgeon: You Olivares M.D.;  Location: [facility];  Service: Orthopedics    ORIF, ANKLE Right 12/6/2023    Procedure: RIGHT SUBTALAR DISLOCATION OPEN REDUCTION INTERNAL FIXATION, TALONAVICULAR OPEN REDUCTION INTERNAL FIXATION, ACHILLES TENDON LENGTHENING, TRIPLE ARTHRODESIS, RIGHT LOWER EXTREMITY APPLICATION OF MULTIPLANAR EXTERNAL FIXATOR;  Surgeon: Calvin Pryor M.D.;  Location: [facility];  Service: Orthopedics    INCISION AND DRAINAGE ORTHOPEDIC Left 10/22/2023    Procedure: REVISION, BELOW KNEE AMPUTATION;  Surgeon: River Colindres M.D.;  Location: [facility];   Service: Orthopedics    IRRIGATION & DEBRIDEMENT GENERAL Left 10/19/2023    Procedure: IRRIGATION AND DEBRIDEMENT, WOUND;  Surgeon: Jay Roe M.D.;  Location: [facility];  Service: Trauma    KNEE AMPUTATION BELOW Left 03/09/2023    Procedure: AMPUTATION, BELOW KNEE;  Surgeon: Wayne Chambers M.D.;  Location: [facility];  Service: Orthopedics    PB TOTAL KNEE ARTHROPLASTY Right 07/22/2020    Procedure: ARTHROPLASTY, KNEE, TOTAL;  Surgeon: Temo Pires M.D.;  Location: [facility];  Service: Orthopedics    TENDON TRANSFER Right 01/22/2020    Procedure: RIGHT DISTAL ULNA RESECTION AND EXTENSOR TENDON TRANSFER;  Surgeon: Marine Rushing M.D.;  Location: [facility];  Service: Orthopedics    OTHER ORTHOPEDIC SURGERY Right 2020    total right knee replacement    IRRIGATION & DEBRIDEMENT ORTHO Left 10/09/2019    Procedure: IRRIGATION AND DEBRIDEMENT, WOUND - PELVIC OSTEOMYELITIS;  Surgeon: You Olivares M.D.;  Location: [facility];  Service: Orthopedics    OTHER SURGICAL PROCEDURE  2019    osteomelitis of pelvis cleaned    GASTRIC BYPASS LAPAROSCOPIC  2000    Lucila en y    CHOLECYSTECTOMY  2000    OTHER SURGICAL PROCEDURE      repair of broken penis.  12/4/23:  Patient denies any surgery on penis.       History Leading to Admission, Conditions that Caused the Need for Rehab (CMS):     Dr. Pascual (Physiatry) recommendations:  ASSESSMENT:  Patient is a 48 y.o. male admitted for left ankle revision by Dr. Rivas Rider MD, and Also Ended up Requiring a Left BKA Revision by Dr. You Olivares      [facility] Code / Diagnosis to Support: 0008.9 - Orthopaedic Disorders: Other Orthopaedic     Rehabilitation: Impaired ADLs and mobility  Patient is a good candidate for inpatient rehab based on needs for PT, OT.  Patient will also benefit from family training.  Patient has a good discharge situation which will be home with spouse.      Barriers to transfer  include: Insurance authorization, TCCs to verify disposition, medical clearance and bed availability      All cases are subject to administrative review and recommendations may change     Disposition recommendations:  - Reasonable candidate to return to [facility]. He has been with [facility] several times this year and has done well with therapy. He now has new WB restrictions and difficulty moving his RLE due to the ex-fix.   - TCC to submit to [facility] insurance for auth   -PMR to follow in the periphery for rehab appropriateness, please reach out with questions or request for medical management     Medical Complexity:     RLE deformity   - S/p corrective surgery by Dr. Pryor on 12/6/23 resulting in ankle spanning ex-fix which is expected to stay on for 8-12 weeks   - NWB RLE, TTWB OK for transfers   - Continue PT/OT while house   - Patient would be appropriate for a short stay of [facility] for transfer training and ADLs with new ex-fix      L BKA revision   - chronic wound with exposed bone   - S/P revision by Dr. Olivares 12/16/23  -NWB LLE      Acute respiratory failure   - On 2L NC O2   - Encouraged IS use     Leukocytosis   - WBC 14   - primary team managing      Acute blood loss anemia   - HGB 9   - Primary team managing      Hyponatremia   - Sodium 129  - Maintain Na >130      Nausea, poor appetite   - Adding Marinol 2.5 mg BID for 2 days, then continue with BID PRN     Phantom limb pain   - Cymbalta 60 mg daily      DVT PPX: Lovenox     Dr. Galdamez (Infectious Disease) recommendations:  Hospital Course/Assessment:  Richard Hubbard II is a 48 y.o. male patient with rheumatoid arthritis with rheumatoid nodules on Humira, history of chronic lower extremity wounds, history of left foot osteomyelitis with strep status post amputation, complicated by BKA stump infection and MSSA bacteremia, status post 6 weeks of IV Ancef, now with chronic tibial osteomyelitis.  Patient underwent I&D with revision amputation of the left BKA  "on 12/16, cultures positive for Serratia.     Pertinent Diagnoses:  Left BKA stump infection with tibial osteomyelitis  Serratia infection  Recent MSSA bacteremia and osteomyelitis, completed therapy  Rheumatoid arthritis  Immunosuppressed status     Plan:  -Continue IV cefepime 2 g every 8 hours.  Recommend 6 weeks of IV antibiotics  -Patient prefers home once a day antibiotic options  -Of note, cardiology is also planning an outpatient TAVR in approximately 1 month.  Given that he has had recurrence of BKA stump infection, the infection risk profile for this procedure will depend on clinical course in the coming weeks  -HIV antibody in a.m.     Disposition: On discharge, can do home IV ertapenem 1 g every 24 hours through 1/23/2024  At least weekly CBC with differential, CMP while on IV antibiotics  -Paper home orders for IV antibiotics faxed to  on 12/21 ([id number])  Need for PICC line: Yes, ordered    Dr. Dalton progress note:  Date of Service  12/22/2023     Chief Complaint  Richard Hubbard II is a 48 y.o. male admitted 12/6/2023 for \"an internal fixation of the right subtalar dislocation     Hospital Course     This is a 48-year-old male with a past medical history significant for moderate AS, bicuspid aortic valve, mildly to moderate pulmonary hypertension with RSVP of 50, history of gastric bypass, history of rheumatoid arthritis on Humira, left BKA stump abscess/osteomyelitis complicated with MSSA bacteremia on 10/23. Patient was supposed to be on Ancef daily 12/1 and continue Augmentin 875/125 1 tablet p.o. twice daily for 2 weeks per ID recs     He was admitted on 12/6 by orthopedic surgery and he was noted to have progressive flatfoot on the right side.  He underwent open reduction of right subtalar joint dislocation, right talonavicular joint dislocation right foot triple arthrodesis, right extensor tendon lengthening and application of multiplanar external fixator right lower extremity on 12/6.  " PT/OT has evaluate the patient, recommend postacute, podiatry stated that patient is a good candidate for acute rehab     Per orthopedic surgery, patient will be nonweightbearing to toe-touch weightbearing for transfer on the right lower extremity.  12/8, Response was called, found to be in A-fib with RVR.       Of note patient was noted to be in cardiogenic shock on pressures, echocardiogram showed EF 45%, severely AAS, moderate AI, moderate to severe TR, RSVP 66.  Cardiology was consulted, plan for TAVR in the next month.  Patient underwent cardiac cath on 12/20.  Currently patient is not on any BB/ACE/ARB considering him being hypotensive.  Cardiology plans to do TAVR next month.     Of note patient has infected left BKA wound, he underwent Reamputation of left leg below the knee, patient is nonweightbearing on the left lower extremity.  Will need PT/OT, DVT prophylaxis Multimodal pain management.  Intraoperative culture growing Serratia marcescens.  Patient was started on ceftriaxone plus Zyvox since 12/9-12/13 and changed to Cefepime on 12/13.  ID was consulted, recommended patient will need 6 weeks of IV Invanz, last day being 1/23/2024.  While patient was on IV antibiotics, needs CBC with differential and CMP every week.     At this time patient is medically stable to be discharged to acute rehab     Interval events:  --- No acute events overnight, vital sign has been stable, patient is alert, awake and answering questions appropriately, he is on 3 L of oxygen  --Patient has infected left BKA wound, culture growing Serratia, ID was consulted, patient will need 6 weeks of IV antibiotics.  Will obtain PICC line  -- Cardiology continue to follow the patient, currently patient is in sinus rhythm and maintaining it.  Heparin was discontinued, will continue Eliquis 5 mg p.o. twice daily.  -Currently we are holding beta-blocker, ACE/ARB secondary to hypotension.-  Plan for TAVR next month.     12/22:  -- No acute  events overnight, vital sign has been stable, patient is currently requiring 4 L of oxygen.  His blood pressure is on the lower side but stable  -- Patient is still hyponatremic with a sodium of 129, alert and oriented, monitor  -- WBC trending down at 12  -- Cardiology continue to follow, patient is undergoing cardiac CT today; will continue Eliquis, hold beta-blockers/ACE/ARB considering him being hypotensive.  -- Patient has severe aortic stenosis, will need TAVR in the next month as an outpatient  -- ID continue to follow the patient patient will need 6 weeks of antibiotics.  While him being in the hospital continues to be fine; upon discharge we will continue Invanz     Patient stated that he continues to have pain, will stop IV pain medication as this will be a barrier for him to get discharged to rehab     I have discussed this patient's plan of care and discharge plan at IDT rounds today with Case Management, Nursing, Nursing leadership, and other members of the IDT team.     Consultants/Specialty  cardiology, critical care, and orthopedics     Code Status  Full Code     Disposition  The patient is not medically cleared for discharge to home or a post-acute facility.  Anticipate discharge to: an inpatient rehabilitation hospital     I have placed the appropriate orders for post-discharge needs.     Review of Systems  Review of Systems   Constitutional:  Positive for malaise/fatigue. Negative for fever.   HENT:  Negative for hearing loss.    Eyes:  Negative for blurred vision.   Respiratory:  Negative for cough, sputum production and shortness of breath.    Cardiovascular:  Negative for chest pain and leg swelling.   Gastrointestinal:  Negative for abdominal pain and nausea.   Genitourinary:  Negative for dysuria and urgency.   Musculoskeletal:  Positive for joint pain and myalgias. Negative for back pain and neck pain.   Neurological:  Negative for dizziness, sensory change and focal weakness.    Psychiatric/Behavioral:  Negative for substance abuse.    All other systems reviewed and are negative.     Physical Exam  Temp:  [36 °C (96.8 °F)-36.7 °C (98.1 °F)] 36.2 °C (97.2 °F)  Pulse:  [70-92] 87  Resp:  [18-20] 20  BP: ()/(39-69) 101/57  SpO2:  [89 %-99 %] 95 %     Physical Exam  Vitals reviewed.   HENT:      Head: Normocephalic and atraumatic. No contusion.   Eyes:      Pupils: Pupils are equal, round, and reactive to light.   Cardiovascular:      Rate and Rhythm: Normal rate and regular rhythm.   Pulmonary:      Effort: Pulmonary effort is normal.      Breath sounds: No wheezing or rhonchi.   Abdominal:      General: Bowel sounds are normal. There is no distension.      Palpations: Abdomen is soft.      Tenderness: There is no abdominal tenderness. There is no rebound.   Musculoskeletal:         General: Normal range of motion.      Cervical back: No rigidity. No muscular tenderness.      Comments: Left BKA  External fixator on right   Skin:     General: Skin is warm.   Neurological:      General: No focal deficit present.      Mental Status: He is alert.      Cranial Nerves: No cranial nerve deficit.      Sensory: No sensory deficit.   Psychiatric:         Mood and Affect: Mood normal.      Fluids     Intake/Output Summary (Last 24 hours) at 12/22/2023 1005  Last data filed at 12/22/2023 0657  Gross per 24 hour  Intake 460 ml  Output 1150 ml  Net -690 ml     Laboratory  Recent Labs    12/20/23  1350 12/21/23  0010 12/22/23  0137  WBC 12.9* 13.5* 12.2*  RBC 3.19* 2.92* 3.01*  HEMOGLOBIN 8.6* 7.8* 8.0*  HEMATOCRIT 27.9* 25.8* 26.5*  MCV 87.5 88.4 88.0  MCH 27.0 26.7* 26.6*  MCHC 30.8* 30.2* 30.2*  RDW 51.4* 53.1* 53.6*  PLATELETCT 148* 194 210  MPV 11.1 10.8 10.7     Recent Labs    12/20/23  1350 12/21/23  0010 12/22/23  0137  SODIUM 130* 129* 129*  POTASSIUM 3.9 3.6 3.8  CHLORIDE 96 96 99  CO2 21 22 19*  GLUCOSE 108* 91 81  BUN 20 22 19  CREATININE 0.81 0.91 0.72  CALCIUM 7.6* 7.4* 8.0*     Recent  Labs    12/20/23  1350 12/20/23 2028  APTT 27.1 37.2*  INR 1.31* 1.28*     Imaging  IR-PICC LINE PLACEMENT W/ GUIDANCE > AGE 5  Final Result             Ultrasound-guided PICC placement performed by qualified nursing staff as   above.      DX-FOOT-COMPLETE 3+ RIGHT  Final Result     1.  ORIF with extensive hardware consistent with known history of triple arthrodesis.  2.  External fixator hardware is also present.     DX-CHEST-PORTABLE (1 VIEW)  Final Result     1.  Pulmonary edema and/or infiltrates are identified, which are stable since the prior exam.  2.  Trace bilateral pleural effusions  3.  Cardiomegaly     DX-CHEST-PORTABLE (1 VIEW)  Final Result     1.  Coronary edema and/or infiltrates, stable since prior study.  2.  Trace right pleural effusion, stable  3.  Cardiomegaly     US-EXTREMITY VENOUS LOWER BILAT  Final Result     EC-ECHOCARDIOGRAM COMPLETE W/ CONT  Final Result     US-RUQ  Final Result     1. Hepatic steatosis.  2. Otherwise, normal.     DX-CHEST-PORTABLE (1 VIEW)  Final Result     1. [place] catheter tip is located within a right lower lobe pulmonary arterial branch, repositioning may be indicated.  2. Stable pulmonary edema, cardiomegaly, and small pleural effusions.     DX-CHEST-PORTABLE (1 VIEW)  Final Result     1.  Small right pleural effusion with adjacent airspace disease.  2.  Worsening interstitial infiltrates and/or edema.     DX-CHEST-LIMITED (1 VIEW)  Final Result     1.  Enlarged cardiac silhouette with changes of vascular congestion.     DX-PORTABLE FLUORO > 1 HOUR  Final Result     Portable fluoroscopy utilized for 1 minute 48 seconds.     INTERPRETING LOCATION: [address] [place] [address] [place], [address]     DX-FOOT-COMPLETE 3+ RIGHT  Final Result     Digitized intraoperative radiograph is submitted for review. This examination is not for diagnostic purpose but for guidance during a surgical procedure. Please see the patient's chart for full procedural details.     INTERPRETING LOCATION:  [address] [facility], [address]     CL-LEFT HEART CATHETERIZATION WITH POSSIBLE INTERVENTION    (Results Pending)  CT-TAVR CHEST-ABD-PELVIS W/WO POST PROCESS    (Results Pending)     Assessment/Plan  * Cardiogenic shock (HCC)  Assessment & Plan  Admitted to the ICU 12/09/23 with suspected BiV failure     - Continue to monitor while off pressors  - IV lasix changed to PO torsemide  - Goal SBP > 90     Now reslved     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)- (present on admission)  Assessment & Plan  Last echo from October 20, 2023 with EF 55%, grade 1 diastolic dysfunction with moderate AAS/AI and RVSP of 45 mmHg --> now in biventricular failure as seen on POCUS per Dr. Gonda 12/9     - Repeat echo 12/9 with EF 45% and severe AS  Cardiology has been following him   I/Os daily, cardiac diet     S/P BKA (below knee amputation), left (HCC)- (present on admission)  Assessment & Plan  With infected stump  -- Orthopedic surgery was consulted, patient underwent reimplantation of the left BKA wound.  Intraoperative culture growing Serratia  ID consulted, will need IV antibiotics for 6 weeks; last day being 1/23/2024     Hypotension  Assessment & Plan  Blood pressure on the lower side, monitor  Holding ACE, ARB, beta-blocker     Hyponatremia- (present on admission)  Assessment & Plan  At 129, monitor     Sepsis (HCC)- (present on admission)  Assessment & Plan  Not POA  Resolved   - Linezolid + ceftriaxone changed to Cefepime  Patient underwent I&D on 12/17, culture growing Serratia, ID consulted, continue cefepime, will need 6 weeks of IV antibiotics while him being in the hospital, will switch to invanz upon dc     Aortic stenosis due to bicuspid aortic valve- (present on admission)  Assessment & Plan     Moderate on last echo in October 2023  - Repeat echo revealing severe AS  Cardiology has been following and  planning for TAVR     Hx of gastric bypass  Assessment & Plan  Encourage ongoing weight loss  management     Atrial fibrillation with RVR (HCC)- (present on admission)  Assessment & Plan  History of A-fib, went into AVR 12/8, converted back to normal sinus rhythm 12/9, now back in afib 12/10     On 12/21:  --Noted to be in Sinus rhythm, started on Eliquis 5 mg twice daily  --follow cards recs     Elevated LFTs  Assessment & Plan     - Improving  - Maintain adequate perfusion  - RUQ US reassuring     Immunosuppression (HCC)  Assessment & Plan  Holding Humira for now given infected left lower extremity     Dislocation of right subtalar joint  Assessment & Plan  He underwent open reduction of right subtalar joint dislocation, right talonavicular joint dislocation right foot triple arthrodesis, right extensor tendon lengthening and application of multiplanar external fixator right lower extremity on 12/6 by ortho  Per orthopedic surgery, patient will be nonweightbearing to toe-touch weightbearing for transfer on the right lower extremity  - PT/OT as tolerated  - DVTppx with Lovenox  - Pain regimen  - Wound care     Hypokalemia- (present on admission)  Assessment & Plan  Replete as needed     Acute kidney injury (HCC)- (present on admission)  Assessment & Plan  Now RESOLVED  Secondary to cardiogenic shock/ATN      Rheumatoid arthritis (HCC)- (present on admission)  Assessment & Plan  As needed analgesics  Holding immunosuppressants for now in the setting of infection     VTE prophylaxis: eliquis    Date of operation: 12/6/2023     Service: Orthopaedic Surgery     Surgeon: Calvin Pryor MD     Assistant:   1. Brandon Card MD  2. PAPITO Sanders     Preoperative Diagnosis:   1. Right subtalar joint dislocation  2. Right talonavicular dislocation   3. Right ankle equinus contracture  4. Severe right pes planovalgus  5. Rheumatoid arthritis     Post operative Diagnosis: Same     Procedure Performed:   1. Open reduction of right subtalar joint dislocation  2. Open reduction of right  talonavicular joint dislocation  3. Right foot triple arthrodesis  4. Right Achilles tendon lengthening  5. Application of multiplanar external fixator right lower extremity     Complications: None     Specimens: None     Tourniquet Time: 0 minutes     Implants: [employer] medical salvage screws 7 mm x 2.  Del fixos 5.0 millimeter screws x 2.  Del multiplanar external fixator     Indication for procedure: The patient is a pleasant 48-year-old male with a history of the above diagnoses.  He had previously lost the left lower extremity secondary to a wound that had developed from severe planovalgus and deformity.  We discussed operative and nonoperative treatment with the patient.  He was at significant risk of increased wound, deep infection secondary to his deformity and not limited ambulation and mobility on the left lower extremity secondary to below-knee amputation.  He elected for operative intervention. Risks of the procedure were discussed with the patient which include but not limited to bleeding, infection, damage to surrounding nerves, tendons, ligaments, other structures, need for future or revision surgery, continued pain, unsightly scar, dissatisfaction with outcome, DVT, stroke, myocardial infarction and even death.  Benefits include improved pain control and improved overall functional outcome.  The patient wished to proceed and the surgical consent forms were signed.     This is a staged procedure on the right lower extremity for planned external fixator removal at approximately 8 weeks post surgically from this procedure.    DATE OF OPERATION: 12/16/2023     PREOPERATIVE DIAGNOSIS: Left below-knee amputation chronic wound and exposed tibia     POSTOPERATIVE DIAGNOSIS: Same     PROCEDURE PERFORMED: Reamputation left leg below the knee     SURGEON: You Olivares M.D.      ASSISTANT: None     ANESTHESIA: General     SPECIMEN: Culture and pathology     ESTIMATED BLOOD LOSS: 20 mL     IMPLANTS:  None     INDICATIONS: The patient is a 48 y.o. male who presented with a history of a previous below-knee amputation to the left leg.  This was complicated by some wound healing issues of which she now has a chronic wound open to the left BKA stump.  I recommended debridement and possible revision amputation and wound closure.  I discussed the risks and benefits of the procedure which include but are not limited to risks of infection, wound healing complication, neurovascular injury, pain, and the medical risks of anesthesia including MI, stroke, and death.  Alternatives to surgery were also discussed, including non-operative management, which I did not recommend.  The patient was in agreement with the plan to proceed, and the informed consent was signed and documented.  I met with the patient pre-operatively and marked the operative extremity with their agreement.  We proceeded to the operating room.     Cardiac Catheterization Laboratory Procedure Note     DATE: 12/20/2023     : Hermann Barcenas MD, MPH     PROCEDURES PERFORMED:  1. Coronary angiography  2. Supravalvular aortography  3. Aortobifemoral angiography  4. Moderate conscious sedation     INDICATIONS:  Severe aortic stenosis insufficiency     CONSENT:  The complete alternatives, risks, and benefits of the procedure were explained to the patient. Informed consent was obtained prior to the procedure.     MEDICATIONS:  1. Lidocaine  2. Fentanyl  3. Midazolam  4. Nitroglycerin  5. Verapamil  6. Heparin     MODERATE CONSCIOUS SEDATION:  I personally supervised the administration of moderate conscious sedation by the nursing staff for 23 minutes.  Start time: 1703  End time: 1726     CONTRAST: Omnipaque 120 cc     RADIATION DOSE (Air Kerma): 276 mGy     FLUOROSCOPY TIME: 5.3 minutes     ACCESS:  1. 6-[place] in the right radial artery     ESTIMATED BLOOD LOSS: <10 cc     COMPLICATIONS: None    Co-morbidities:  See PMH  Potential Risk -  Complications: Contractures, Deep Vein Thrombosis, Incontinence, Malnutrition, Pain, Pneumonia, Pressure Ulcer, and Urinary Tract Infection  Level of Risk: High    Ongoing Medical Management Needed (Medical/Nursing Needs):   Patient Active Problem List    Diagnosis Date Noted    Hypotension 12/20/2023    Pulmonary hypertension ([facility]) 12/10/2023    Cardiogenic shock ([facility]) 12/09/2023    Elevated LFTs 12/09/2023    Immunosuppression ([facility]) 12/07/2023    Dislocation of right subtalar joint 12/06/2023    Dislocation of right subtalar joint, initial encounter 12/06/2023    BKA stump complication (HCC) 10/27/2023    Class 1 obesity due to excess calories without serious comorbidity with body mass index (BMI) of 32.0 to 32.9 in adult 10/21/2023    Bacteremia due to Staphylococcus aureus 10/20/2023    Abscess of left lower extremity 10/20/2023    Somnolence 10/19/2023    Murmur, cardiac 10/19/2023    Acquired planovalgus deformity of right foot 10/19/2023    Right foot pain 10/10/2023    Acute right ankle pain 10/10/2023    Blood glucose elevated 09/04/2023    Cough 09/01/2023    Cardiac volume overload 08/29/2023    Anemia 08/24/2023    Hx of gastric bypass 08/21/2023    Chronic diastolic heart failure with preserved ejection fraction ([facility]) 08/21/2023    CHF (congestive heart failure), NYHA class I, acute on chronic, combined ([facility]) 08/20/2023    Hypokalemia 08/14/2023    Acute combined systolic and diastolic heart failure ([facility]) 08/12/2023    Cardiorenal syndrome 08/12/2023    Elevated liver function tests 08/12/2023    Mitral valve mass 08/12/2023    Left bundle branch block 08/12/2023    Acute kidney injury (HCC) 08/12/2023    Obesity (BMI 30-39.9) 05/31/2023    Umbilical hernia without obstruction and without gangrene 05/31/2023    Normocytic anemia 05/31/2023    S/P BKA (below knee amputation), left (HCC) 03/21/2023    Vitamin D deficiency 03/14/2023    MRSA bacteremia 03/08/2023    Tenosynovitis of left lower leg  "03/06/2023    Encephalopathy 02/28/2023    Hyponatremia 02/28/2023    Generalized weakness 06/15/2022    Impaired mobility and ADLs 06/15/2022    Right foot ulcer (HCC) 06/15/2022    Chronic use of opiate drug for therapeutic purpose 01/18/2021    Localized osteoporosis without current pathological fracture 10/19/2020    History of osteomyelitis 08/14/2020    History of immunosuppressive therapy 08/14/2020    History of atrial fibrillation ( single episode 2019) 07/23/2020    History of cigarette smoking 07/21/2020    Atrial fibrillation with RVR (HCC) 11/05/2019    Sepsis (HCC) 10/02/2019    Aortic root dilatation (HCC) 09/13/2019    Aortic stenosis due to bicuspid aortic valve 09/12/2019    Rheumatoid arthritis ([facility]) 09/08/2015    Chronic, continuous use of opioids 09/08/2015    Chronic pain of right knee 09/08/2015    History of bleeding ulcers 02/12/2015    MONICA (obstructive sleep apnea) 03/09/2010    Mood disorder ([facility]) 05/21/2009    ELISEO (generalized anxiety disorder) 05/21/2009    Insomnia 05/21/2009     A & O    Current Vital Signs:   Temperature: 36.3 °C (97.3 °F) Pulse: 96 Respiration: (!) 24 (Rn notified ) Blood Pressure: 117/60  Weight: 89.3 kg (196 lb 13.9 oz) Height: 180.3 cm (5' 11\")  Pulse Oximetry: 91 % O2 (LPM): 4      Completed Laboratory Reports:  Recent Labs     12/20/23  0524 12/20/23  1350 12/20/23 2028 12/21/23  0010 12/22/23  0137   WBC 14.0* 12.9*  --  13.5* 12.2*   HEMOGLOBIN 9.1* 8.6*  --  7.8* 8.0*   HEMATOCRIT 30.1* 27.9*  --  25.8* 26.5*   PLATELETCT 189 148*  --  194 210   SODIUM 129* 130*  --  129* 129*   POTASSIUM 4.4 3.9  --  3.6 3.8   BUN 20 20  --  22 19   CREATININE 0.77 0.81  --  0.91 0.72   ALBUMIN 3.0* 2.7*  --  2.6* 2.5*   GLUCOSE 100* 108*  --  91 81   INR  --  1.31* 1.28*  --   --      Additional Labs: Not Applicable    Prior Living Situation:   Housing / Facility: 1 Story House  Steps Into Home: 0  Steps In Home:  (ramp in place)  Lives with - Patient's Self Care Capacity: " Spouse, Child Less than 18 Years of Age (per PT note)  Equipment Owned: Front-Wheel Walker, 4-Wheel Walker, Wheelchair (per PT note)    Prior Level of Function / Living Situation:   Physical Therapy: Prior Services: Unable To Determine At This Time  Housing / Facility: [address] [place] [address]  Steps Into Home: 0  Steps In Home:  (ramp in place)  Bathroom Set up:  (unable to determine)  Equipment Owned: Front-Wheel Walker, 4-Wheel Walker, Wheelchair (per PT note)  Lives with - Patient's Self Care Capacity: Spouse, Child Less than 18 Years of Age (per PT note)  Bed Mobility: Independent  Transfer Status: Independent  Wheelchair: Independent  Current Level of Function:   Gait Level Of Assist: Unable to Participate  Supine to Sit: Minimal Assist (for RLE OOB)  Sit to Supine: Moderate Assist  Scooting: Minimal Assist  Rolling: Moderate Assist to Lt., Minimal Assist to Rt. (support to R ex-fix)  Skilled Intervention: Verbal Cuing, Compensatory Strategies, Sequencing, Facilitation  Comments: used bed features  Sit to Stand: Unable to Participate  Bed, Chair, Wheelchair Transfer:  (deferred given patient pending imaging following session)  Transfer Method: Stand Step (to/from )  Skilled Intervention: Compensatory Strategies, Postural Facilitation, Tactile Cuing, Verbal Cuing, Sequencing  Sitting in Chair: 45 min to   Sitting Edge of Bed: 10 min  Standing: unable  Occupational Therapy:      Prior Services: Unable To Determine At This Time  Housing / Facility: [address] [place] [address]  Current Level of Function:   Upper Body Dressing: Moderate Assist (gown change; lines impact)  Lower Body Dressing:  (NT; ex-fix to distal RLE; dressing to L residual limb)  Toileting:  (Rivera, fernanda BM)  Skilled Intervention: Compensatory Strategies  Speech Language Pathology:      Rehabilitation Prognosis/Potential: Good  Estimated Length of Stay: 10-12 days    Nursing:      Incontinent    Scope/Intensity of Services Recommended:  Physical Therapy: 1.5 hr / day   5 days / week. Therapeutic Interventions Required: Maximize Endurance, Mobility, Strength, and Safety  Occupational Therapy: 1.5 hr / day 5 days / week. Therapeutic Interventions Required: Maximize Self Care, ADLs, IADLs, and Energy Conservation  Rehabilitation Nursin/7. Therapeutic Interventions Required: Monitor Pain, Skin, Wound(s), Vital Signs, Intake and Output, Labs, Safety, and Family Training  Rehabilitation Physician: 3 - 5 days / week. Therapeutic Interventions Required: Medical Management  Respiratory Care: Pulmonary Toileting. Therapeutic Interventions Required: Pulmonary Toileting and O2 Weaning  Dietician: Nutritional Assessment/Education. Therapeutic Interventions Required: .    He requires 24-hour rehabilitation nursing to manage bowel and bladder function, skin care, surgical incision, wound, nutrition and fluid intake, pulmonary hygiene, pain control, safety, medication management, and patient/family goals. In addition, rehabilitation nursing will reiterate and reinforce therapy skills and equipment use, including ADLs, as well as provide education to the patient and family. Richard Salinas Stevie AMATO is willing to participate in and is able to tolerate the proposed plan of care.    Rehabilitation Goals and Plan (Expected frequency & duration of treatment in the IRF):   Return to the Community, Modified Independent Level of Care, and Family Able to Provide 24/7 Assistance  Anticipated Date of Rehabilitation Admission: 23  Patient/Family oriented IRF level of care/facility/plan: Yes  Patient/Family willing to participate in IRF care/facility/plan: Yes  Patient able to tolerate IRF level of care proposed: Yes  Patient has potential to benefit IRF level of care proposed: Yes  Comments: Not Applicable    Special Needs or Precautions - Medical Necessity:  Safety Concerns/Precautions:  Fall Risk / High Risk for Falls, Balance, and Bed / Chair Alarm  Complex Wound Care: Surgical  Precautions:  Fall Risk;Non Weight Bearing Left Lower Extremity;Non Weight Bearing Right Lower Extremity  Comments: NWB TTWB RLE for slide board transfers only  Pain Management  IV Site: PICC  Requires Oxygen  Current Medications:    Current Facility-Administered Medications Ordered in Epic   Medication Dose Route Frequency Provider Last Rate Last Admin    [Held by provider] traMADol (Ultram) 50 MG tablet 50 mg  50 mg Oral Q6HRS PRN Gil Dalton M.D.        morphine ER (Ms Contin) tablet 15 mg  15 mg Oral Q12HRS Gil Dalton M.D.        morphine (MS IR) tablet 30 mg  30 mg Oral Q6HRS PRN Gil Dalton M.D.   30 mg at 12/22/23 1412    apixaban (Eliquis) tablet 5 mg  5 mg Oral BID Ramin Hoang A.P.N.   5 mg at 12/22/23 0534    cefepime (Maxipime) 2 g in  mL IVPB  2 g Intravenous Q8HRS Romie Galdamez M.D.   Stopped at 12/22/23 1446    ondansetron (Zofran) syringe/vial injection 4 mg  4 mg Intravenous Q4HRS PRN Arnoldo Washington M.D.   4 mg at 12/22/23 1421    dronabinol (Marinol) capsule 2.5 mg  2.5 mg Oral BID PRN Jed Pascual DMaryOMary        miconazole (Micotin) 2 % cream   Topical BID Arnoldo Washington M.D.   Given at 12/22/23 0535    acetaminophen (Tylenol) tablet 650 mg  650 mg Oral Q6HRS Todd Soliz D.OMary   650 mg at 12/22/23 1126    dapagliflozin propanediol (Farxiga) tablet 10 mg  10 mg Oral DAILY Romaine Clements M.D.   10 mg at 12/22/23 0533    lactulose 20 GM/30ML solution 30 mL  30 mL Oral TID You Moore M.D.   30 mL at 12/18/23 1755    spironolactone (Aldactone) tablet 25 mg  25 mg Oral Q DAY Todd Soliz D.OMary   25 mg at 12/22/23 0533    senna-docusate (Pericolace Or Senokot S) 8.6-50 MG per tablet 2 Tablet  2 Tablet Oral BID Danny Dobbs M.D.   2 Tablet at 12/22/23 0533    And    polyethylene glycol/lytes (Miralax) Packet 1 Packet  1 Packet Oral QDAY PRN Danny Dobbs M.D.   1 Packet at 12/13/23 0518    And    magnesium hydroxide (Milk Of Magnesia) suspension 30 mL   30 mL Oral QDAY PRN Danny Dobbs M.D.   30 mL at 12/13/23 0516    And    bisacodyl (Dulcolax) suppository 10 mg  10 mg Rectal QDAY PRN Danny Dobbs M.D.        albuterol (Proventil) 2.5mg/0.5ml nebulizer solution 2.5 mg  2.5 mg Nebulization Q4H PRN (RT) iGl Dalton M.D.        omeprazole (PriLOSEC) capsule 20 mg  20 mg Oral DAILY Gil Dalton M.D.   20 mg at 12/22/23 0536    DULoxetine (Cymbalta) capsule 60 mg  60 mg Oral DAILY Torsten Grove M.D.   60 mg at 12/22/23 0533     No current [facility]-ordered outpatient medications on file.     Diet:   DIET ORDERS (From admission to next 24h)       Start     Ordered    12/21/23 1354  Diet Order Diet: Cardiac  ALL MEALS        Question:  Diet:  Answer:  Cardiac    12/21/23 1353    12/14/23 0939  Supplements  ONCE        Question Answer Comment   Which Supplement Ensure    Ensure: Ensure Plus Carton BID       12/14/23 0939                    Anticipated Discharge Destination / Patient/Family Goal:  Destination: Home with Assistance Support System: Spouse  Anticipated home health services: OT and PT  Previously used HH service/ provider: Not Applicable  Anticipated DME Needs: Oxygen, Walker, Wheelchair, and Life Line  Outpatient Services: OT and PT  Alternative resources to address additional identified needs:   Future Appointments   Date Time Provider Department Center   1/2/2024 10:30 AM MARTÍN Vigil None      Pre-Screen Completed: 12/22/2023 2:59 PM Ayaz Vasquez L.P.N.

## 2023-12-22 NOTE — DIETARY
Nutrition Update:    Day 16 of admit.  Richard Hubbard II is a 48 y.o. male with admitting DX of Right foot pain [M79.671]  Acute right ankle pain [M25.571]  Dislocation of right subtalar joint, initial encounter [S93.314A]  Sepsis (HCC) [A41.9].  Patient being followed to optimize nutrition.    Current Diet: Cardiac with supplements     Per ADLs pt ate % of dinner last night. No other recent meals documented. Continue to encourage intake of all meals including magic cup supplements.     Problem: Nutritional:  Goal: Achieve adequate nutritional intake  Description: Patient will consume >50% of meals  Outcome: Slowly progressing    RD following.

## 2023-12-22 NOTE — THERAPY
Physical Therapy   Daily Treatment     Patient Name: Richard Hubbard II  Age:  48 y.o., Sex:  male  Medical Record #: [id number]  Today's Date: 12/22/2023     Precautions  Precautions: Fall Risk;Non Weight Bearing Left Lower Extremity;Non Weight Bearing Right Lower Extremity  Comments: NWB TTWB RLE for slide board transfers only    Assessment    Patient's functional mobility limited by ex fix, NWB BLE, functional weakness, and decreased activity tolerance today. He was motivated to participate with therapy but demonstrated increased WOB with activity. He requested to perform ADLs once sitting EOB and did so without assist; no assist required for sitting balance either. Will continue to follow.    Plan    Treatment Plan Status: Continue Current Treatment Plan  Type of Treatment: Bed Mobility, Equipment, Neuro Re-Education / Balance, Self Care / Home Evaluation, Therapeutic Activities, Therapeutic Exercise  Treatment Frequency: 4 Times per Week  Treatment Duration: Until Therapy Goals Met    DC Equipment Recommendations: Unable to determine at this time  Discharge Recommendations: Recommend post-acute placement for additional physical therapy services prior to discharge home      Subjective    RN cleared patient for therapy, received in bed, agreeable     Objective       12/22/23 0836   Charge Group   Charges  Yes   PT Therapeutic Activities (Units) 1   Total Time Spent   PT Total Time Yes   PT Therapeutic Activities Time Spent (Mins) 21   PT Total Time Spent (Calculated) 21   Precautions   Precautions Fall Risk;Non Weight Bearing Left Lower Extremity;Non Weight Bearing Right Lower Extremity   Comments NWB TTWB RLE for slide board transfers only   Vitals   O2 (LPM) 4   O2 Delivery Device Silicone Nasal Cannula   Vitals Comments increased WOB throughout   Pain 0 - 10 Group   Location Leg   Location Orientation Right;Left   Therapist Pain Assessment During Activity;Nurse Notified   Cognition    Cognition /  Consciousness WDL   Level of Consciousness Alert   Comments appears to be at/near baseline; pleasant, cooperative, motivated   Active ROM Lower Body    Comments as before; LLE WFL given BKA and RLE in ex fix   Strength Lower Body   Comments limited ability to assess; patient required assist for RLE OOB 2/2 ex fix, no assist required with LLE   Balance   Sitting Balance (Static) Fair +   Sitting Balance (Dynamic) Fair   Weight Shift Sitting Fair   Skilled Intervention Verbal Cuing;Compensatory Strategies;Sequencing   Comments no LOB sitting EOB   Bed Mobility    Supine to Sit Minimal Assist  (for RLE OOB)   Sit to Supine Moderate Assist   Scooting Minimal Assist   Skilled Intervention Verbal Cuing;Compensatory Strategies;Sequencing;Facilitation   Comments used bed features   Gait Analysis   Gait Level Of Assist Unable to Participate   Functional Mobility   Sit to Stand Unable to Participate   Bed, Chair, Wheelchair Transfer   (deferred given patient pending imaging following session)   How much difficulty does the patient currently have...   Turning over in bed (including adjusting bedclothes, sheets and blankets)? 1   Sitting down on and standing up from a chair with arms (e.g., wheelchair, bedside commode, etc.) 1   Moving from lying on back to sitting on the side of the bed? 1   How much help from another person does the patient currently need...   Moving to and from a bed to a chair (including a wheelchair)? 2   Need to walk in a hospital room? 1   Climbing 3-5 steps with a railing? 1   6 clicks Mobility Score 7   Patient / Family Goals    Patient / Family Goal #1 get better   Short Term Goals    Short Term Goal # 1 Pt will perform bed mobility with supervision to progress function in 6 visits.   Goal Outcome # 1 goal not met   Short Term Goal # 2 Pt will transfer bed to wheelchair with supervision to progress function in 6 visits.   Goal Outcome # 2 Goal not met   Short Term Goal # 3 Pt will propel wheelchair  100ft at mod I level to progress function in 6 visits.   Goal Outcome # 3 Goal not met   Physical Therapy Treatment Plan   Physical Therapy Treatment Plan Continue Current Treatment Plan   Anticipated Discharge Equipment and Recommendations   DC Equipment Recommendations Unable to determine at this time   Discharge Recommendations Recommend post-acute placement for additional physical therapy services prior to discharge home   Interdisciplinary Plan of Care Collaboration   IDT Collaboration with  Nursing   Patient Position at End of Therapy In Bed;Call Light within Reach;Tray Table within Reach;Phone within Reach   Collaboration Comments RN aware of visit, response   Session Information   Date / Session Number  12/22-4 (1/4, 12/27)

## 2023-12-23 NOTE — PROGRESS NOTES
1706 Pt arrived at [facility] from [facility] via [facility]. Dr. Pisano to follow for diagnosis of Unilateral complete BKA left. Initial assessment initiated. Pt oriented to room and facility routine and safety measures; pt education binder provided and discussed. Pt A/O x 4, continent of bowel and bladder. Mod/Max assist for transfers. All wounds photographed and documented; photos uploaded to . Pt c/o pain to RLE, will medicated per MAR. Pt positioned for comfort in bed. Call light within reach, safety measures in place. Will continue to monitor.

## 2023-12-23 NOTE — PROGRESS NOTES
4 Eyes Skin Assessment Completed by FAITH Sandoval and FAITH Heath.    Head WDL glasses  Ears WDL o2 NC   Nose WDL  Mouth WDL  Neck WDL  Breast/Chest WDL  Shoulder Blades WDL  Spine WDL  (R) Arm/Elbow/Hand WDL  (L) Arm/Elbow/Hand WDL  Abdomen WDL  Groin Redness, Excoriation, and Rash  Scrotum/Coccyx/Buttocks Redness and Excoriation  (R) Leg Redness external fix   (L) Leg Incision bka with sutures  (R) Heel/Foot/Toe incisions medial, lateral and top of foot with sutures  (L) Heel/Foot/Toe BKA          Devices In Places Nasal Cannula      Interventions In Place Gray Ear Foams, TAP System, Pillows, Q2 Turns, and Low Air Loss Mattress    Possible Skin Injury Yes    Pictures Uploaded Into Epic Yes  Wound Consult Placed Yes  RN Wound Prevention Protocol Ordered Yes

## 2023-12-23 NOTE — PROGRESS NOTES
NURSING DAILY NOTE    Name: Richard Hubbard II   Date of Admission: 12/22/2023   Admitting Diagnosis: Unilateral complete BKA, left, initial encounter ([facility])  Attending Physician: Keaton Pisano M.d.  Allergies: Nsaids, Diphenhydramine, Mirtazapine, Penicillins, and Promethazine    Safety  Patient Assist     Patient Precautions     Precaution Comments     Bed Transfer Status     Toilet Transfer Status      Assistive Devices     Oxygen  Nasal Cannula  Diet/Therapeutic Dining  Current Diet Order   Procedures    Diet Order Diet: Cardiac     Pill Administration  whole  Agitated Behavioral Scale  20  ABS Level of Severity  No Agitation    Fall Risk  Has the patient had a fall this admission?   No  Osiris Healy Fall Risk Scoring  15, HIGH RISK  Fall Risk Safety Measures  bed alarm, chair alarm, poor balance, and low vision/ hearing    Vitals  Temperature: 36.5 °C (97.7 °F)  Temp src: Oral  Pulse: 96  Respiration: (Abnormal) 24  Blood Pressure: 111/68  Blood Pressure MAP (Calculated): 82 MM HG  BP Location: Right, Upper Arm  Patient BP Position: Supine     Oxygen  Pulse Oximetry: 96 %  O2 (LPM): 4  O2 Delivery Device: Nasal Cannula    Bowel and Bladder  Last Bowel Movement  12/19/23 (per report)  Stool Type     Bowel Device     Continent  Bladder: Continent void   Bowel: Continent movement  Bladder Function  Urine Void (mL): 150 ml  Number of Times Voided: 1  Urine Color: Melonie  Genitourinary Assessment   Bladder Assessment (WDL):  WDL Except  Rivera Catheter: Not Applicable  Urine Color: Melonie  Time Void: No  Bladder Scan: Post Void  $ Bladder Scan Results (mL): 35    Skin  Nam Score   15  Sensory Interventions   Bed Types: Standard/Trauma Mattress  Skin Preventative Measures: Pillows in Use to Float Heels  Moisture Interventions  Moisturizers/Barriers: Barrier Wipes      Pain  Pain Rating Scale  8 - Awful, hard to do anything  Pain Location  Leg,  Foot  Pain Location Orientation  Right  Pain Interventions   Medication (see MAR)    ADLs    Bathing      Linen Change      Personal Hygiene     Chlorhexidine Bath      Oral Care     Teeth/Dentures     Shave     Nutrition Percentage Eaten     Environmental Precautions     Patient Turns/Positioning  Patient Turns Self from Side to Side  Patient Turns Assistance/Tolerance     Bed Positions     Head of Bed Elevated         Psychosocial/Neurologic Assessment  Psychosocial Assessment  Psychosocial (WDL):  WDL Except  Patient Behaviors: Anxious  Neurologic Assessment  Neuro (WDL): Within Defined Limits  Level of Consciousness: Alert  Orientation Level: Oriented X4  Cognition: Appropriate judgement  Speech: Clear  Pupil Assesment: No  EENT (WDL):  WDL Except    Cardio/Pulmonary Assessment  Edema      Respiratory Breath Sounds     Cardiac Assessment   Cardiac (WDL):  WDL Except

## 2023-12-23 NOTE — DISCHARGE INSTRUCTIONS
HF Patient Discharge Instructions  Monitor your weight daily, and maintain a weight chart, to track your weight changes.   Activity as tolerated, unless your Doctor has ordered otherwise.   Follow a low fat, low cholesterol, low salt diet unless instructed otherwise by your Doctor. Read the labels on the back of food products and track your intake of fat, cholesterol and salt.   Fluid Restriction No. If a Fluid Restriction has been ordered by your Doctor, measure fluids with a measuring cup to ensure that you are not exceeding the restriction.   No smoking.  Oxygen Yes. If your Doctor has ordered that you wear Oxygen at home, it is important to wear it as ordered.  Did you receive an explanation from staff on the importance of taking each of your medications and why it is necessary to keep taking them unless your doctor says to stop? Yes  Were all of your questions answered about how to manage your heart failure and what to do if you have increased signs and symptoms after you go home? Yes  Do you feel like your heart failure care team involved you in the care treatment plan and allowed you to make decisions regarding your care while in the hospital and addressed any discharge needs you might have? Yes    See the educational handout provided at discharge for more information on monitoring your daily weight, activity and diet. This also explains more about Heart Failure, symptoms of a flare-up and some of the tests that you have undergone.     Warning Signs of a Flare-Up include:  Swelling in the ankles or lower legs.  Shortness of breath, while at rest, or while doing normal activities.   Shortness of breath at night when in bed, or coughing in bed.   Requiring more pillows to sleep at night, or needing to sit up at night to sleep.  Feeling weak, dizzy or fatigued.     When to call your Doctor:  Call your Primary Care Physician or Cardiologist if:   You experience any pain radiating to your jaw or neck.  You have  any difficulty breathing.  You experience weight gain of 3 lbs in a day or 5 lbs in a week.   You feel any palpitations or irregular heartbeats.  You become dizzy or lose consciousness.   If you have had an angiogram or had a pacemaker or AICD placed, and experience:  Bleeding, drainage or swelling at the surgical / puncture site.  Fever greater than 100.0 F  Shock from internal defibrillator.  Cool and / or numb extremities.     Please access the [facility] My HF Guide/Heart Failure Interactive Workbook:   [email or web address]

## 2023-12-23 NOTE — FLOWSHEET NOTE
12/22/23 1719   Protocol Assessment   Initial Assessment Yes   Patient History   Pulmonary Diagnosis hx of Cardiogenic shock   Procedures Relevant to Respiratory Status None   Home O2 No   Nocturnal CPAP No   Home Treatments/Frequency No   Sleep Apnea Screening   Have you had a sleep study? Yes   Have you been diagnosed with sleep apnea? Yes   Do you use a CPAP/BIPAP/AUTOPAP? No  (Lost 330 lbs since his sleep study)   Protocol Pathways   Protocol Pathways None

## 2023-12-23 NOTE — PROGRESS NOTES
"      Orthopaedic Progress Note    Interval changes:  Patient doing well   LLE dressings CDI  Cleared for DC to rehab by ortho pending medicine clearance    ROS - Patient denies any new issues.  Pain well controlled.    /59   Pulse 79   Temp 36.2 °C (97.2 °F) (Temporal)   Resp 20   Ht 1.803 m (5' 11\")   Wt 89.3 kg (196 lb 13.9 oz)   SpO2 93%     Patient seen and examined  No acute distress  Breathing non labored  RRR  LLE dressings CDI, no contracture noted.    Recent Labs     12/20/23  1350 12/21/23  0010 12/22/23  0137   WBC 12.9* 13.5* 12.2*   RBC 3.19* 2.92* 3.01*   HEMOGLOBIN 8.6* 7.8* 8.0*   HEMATOCRIT 27.9* 25.8* 26.5*   MCV 87.5 88.4 88.0   MCH 27.0 26.7* 26.6*   MCHC 30.8* 30.2* 30.2*   RDW 51.4* 53.1* 53.6*   PLATELETCT 148* 194 210   MPV 11.1 10.8 10.7       Active Hospital Problems    Diagnosis     Atrial fibrillation with RVR (HCC) [I48.91]      Priority: High    Pulmonary hypertension (HCC) [I27.20]      Priority: Medium    Rheumatoid arthritis (HCC) [M06.9]      Priority: Low     On Humira      Hypotension [I95.9]     Cardiogenic shock (HCC) [R57.0]     Elevated LFTs [R79.89]     Immunosuppression (HCC) [D84.9]     Dislocation of right subtalar joint [S93.314A]     Hypokalemia [E87.6]     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) [I50.41]     Acute kidney injury (HCC) [N17.9]     S/P BKA (below knee amputation), left (HCC) [Z89.512]     Hyponatremia [E87.1]     Sepsis (HCC) [A41.9]     Aortic stenosis due to bicuspid aortic valve [Q23.0, Q23.1]        Assessment/Plan:  Patient doing well   LLE dressings CDI  Cleared for DC to rehab by ortho pending medicine clearance  POD#6 S/P Reamputation left leg below the knee   Wt bearing status - NWB LLE  Wound care/Drains - Dressings to be changed every other day by nursing. Or PRN for saturation starting POD#2  Future Procedures - none planned   Lovenox: Start ok to start per ortho, Duration-until ambulatory > 150'  Sutures/Staples " out- 14-21 days post operatively. Removal will completed by ortho mid levels only.  PT/OT-initiated  Antibiotics: Perioperative completed  DVT Prophylaxis- TEDS/SCDs/Foot pumps  [place]-not needed per ortho  Case Coordination for Discharge Planning - Disposition per therapy recs.

## 2023-12-23 NOTE — CONSULTS
HOSPITAL MEDICINE CONSULTATION    Requesting Physician:  Dr. Khan    Reason for Consult:  General Medical Management    History of Present Illness:  The patient is a 48-year-old  male with past medical history significant for aortic stenosis, rheumatoid arthritis on Humira, gastric bypass surgery, and chronic pain syndrome.  He was admitted to [facility] on 12/6/23 for elective surgical repair of progressive deformity of the right foot.  The patient underwent open reduction and internal fixation with placement of external fixator by Dr. Pryor.  He was also found to have recurrent left BKA stump infection and underwent second revision on 12/16/23 by Dr. Olivares.  He remains on antimicrobial therapy.  His hospital course was complicated by atrial fibrillation with rapid ventricular response.  Echocardiogram showed ejection fraction 45%, right ventricular systolic pressure 66 mmHg, left atrial enlargement, and severe AS/MR.  Cardiology consultation recommended transcatheter aortic valve replacement next month.  Due to his ongoing functional debility, the patient was transferred to [facility] on 12/22/23.  Hospital Medicine consultation is requested to assist in the general medical management of this patient.  He is noted to have anemia, hyponatremia, abnormal thyroid studies, and vitamin D deficiency.    Review of Systems:  Review of Systems   Constitutional:  Negative for chills and fever.   HENT: Negative.     Eyes: Negative.    Respiratory:  Positive for shortness of breath. Negative for cough.    Cardiovascular:  Negative for chest pain and palpitations.   Gastrointestinal:  Negative for abdominal pain, nausea and vomiting.   Genitourinary: Negative.    Musculoskeletal:         Wound pain   Skin:  Negative for itching and rash.   Endo/Heme/Allergies:  Negative for polydipsia. Does not bruise/bleed easily.   All other systems reviewed and are  "negative.      Allergies:  Allergies   Allergen Reactions    Nsaids      Bleeding, \"I had a bleeding ulcer\"    Diphenhydramine Unspecified     \"I get agitated\"    Mirtazapine Unspecified     I had a hard time waking up, lacking mental focus.     Penicillins      \"Had some dizziness\"  Tolerated Unasyn 10/2019  Tolerated Zosyn 02/2023    Promethazine Unspecified     \"I get very agitated\"       Medications:    Current Facility-Administered Medications:     sodium chloride (Salt) tablet 2 g, 2 g, Oral, TID WITH MEALS, Iris Collins M.D., 2 g at 12/25/23 1822    sodium zirconium cyclosilicate (Lokelma) packet 15 g, 15 g, Oral, BID, Iris Collins M.D., 15 g at 12/25/23 2207    ipratropium-albuterol (DUONEB) nebulizer solution, 3 mL, Nebulization, TID, SIDDHARTH SykesOMary, 3 mL at 12/25/23 1901    linezolid (Zyvox) tablet 600 mg, 600 mg, Oral, Q12HRS, Iris Collins M.D., 600 mg at 12/25/23 2206    morphine (MS IR) tablet 30 mg, 30 mg, Oral, Q4HRS, SIDDHARTH SykesO., 30 mg at 12/25/23 1526    oxyCODONE immediate release (Roxicodone) tablet 10 mg, 10 mg, Oral, Q4HRS PRN, SIDDHARTH SykesO.    vitamin D3 (Cholecalciferol) tablet 2,000 Units, 2,000 Units, Oral, DAILY, Iris Collins M.D., 2,000 Units at 12/25/23 0841    Respiratory Therapy Consult, , Nebulization, Continuous RT, Alexx Khan D.O.    Pharmacy Consult Request ...Pain Management Review 1 Each, 1 Each, Other, PHARMACY TO DOSE, Alexx Khan D.O.    acetaminophen (Tylenol) tablet 1,000 mg, 1,000 mg, Oral, TID, 1,000 mg at 12/25/23 2206 **FOLLOWED BY** [START ON 12/27/2023] acetaminophen (Tylenol) tablet 1,000 mg, 1,000 mg, Oral, Q6HRS PRN, GINA Sykes.O.    hydrALAZINE (Apresoline) tablet 25 mg, 25 mg, Oral, Q8HRS PRN, GINA Sykes.O.    acetaminophen (Tylenol) tablet 650 mg, 650 mg, Oral, Q4HRS PRN, GINA Sykes.O.    carboxymethylcellulose (Refresh Tears) 0.5 % ophthalmic drops 1 Drop, 1 Drop, Both Eyes, PRN, SIDDHARTH SykesO.    " benzocaine-menthol (Cepacol) lozenge 1 Lozenge, 1 Lozenge, Mouth/Throat, Q2HRS PRN, Alexx Khan D.O.    mag hydrox-al hydrox-simeth (Maalox Plus Es Or Mylanta Ds) suspension 20 mL, 20 mL, Oral, Q2HRS PRN, Alexx Khan D.O.    ondansetron (Zofran ODT) dispertab 4 mg, 4 mg, Oral, 4X/DAY PRN, 4 mg at 12/23/23 0849 **OR** ondansetron (Zofran) syringe/vial injection 4 mg, 4 mg, Intramuscular, 4X/DAY PRN, GINA Sykes.O.    traZODone (Desyrel) tablet 50 mg, 50 mg, Oral, QHS PRN, GINA Sykes.O.    sodium chloride (Ocean) 0.65 % nasal spray 2 Spray, 2 Spray, Nasal, PRN, Alexx Khan D.O.    therapeutic multivitamin-minerals (Theragran-M) tablet 1 Tablet, 1 Tablet, Oral, DAILY WITH LUNCH, Alexx Khan D.O., 1 Tablet at 12/25/23 1059    apixaban (Eliquis) tablet 5 mg, 5 mg, Oral, BID, Alexx Khan D.O., 5 mg at 12/25/23 2206    cefepime (Maxipime) 2 g in  mL IVPB, 2 g, Intravenous, Q8HRS, Alexx Khan D.O., Stopped at 12/25/23 2250    dapagliflozin propanediol (Farxiga) tablet 10 mg, 10 mg, Oral, DAILY, Alexx Khan D.O., 10 mg at 12/25/23 0842    DULoxetine (Cymbalta) capsule 60 mg, 60 mg, Oral, DAILY, Alexx Khan D.O., 60 mg at 12/25/23 0845    lactulose 20 GM/30ML solution 30 mL, 30 mL, Oral, TID, Alexx Khan D.O., 30 mL at 12/25/23 2207    miconazole (Micotin) 2 % cream, , Topical, BID, Alexx Khan D.O., Given at 12/25/23 2100    morphine ER (Ms Contin) tablet 15 mg, 15 mg, Oral, Q12HRS, Alexx Khan D.O., 15 mg at 12/25/23 2206    omeprazole (PriLOSEC) capsule 20 mg, 20 mg, Oral, DAILY, Alexx Khan D.O., 20 mg at 12/25/23 0843    senna-docusate (Pericolace Or Senokot S) 8.6-50 MG per tablet 2 Tablet, 2 Tablet, Oral, BID, 2 Tablet at 12/25/23 0842 **AND** polyethylene glycol/lytes (Miralax) Packet 1 Packet, 1 Packet, Oral, QDAY PRN **AND** magnesium hydroxide (Milk Of Magnesia) suspension 30 mL, 30 mL, Oral, QDAY PRN, 30 mL at 12/23/23  0841 **AND** bisacodyl (Dulcolax) suppository 10 mg, 10 mg, Rectal, QDAY PRN, SIDDHARTH SykesO.    albuterol (Proventil) 2.5mg/3ml nebulizer solution 2.5 mg, 2.5 mg, Nebulization, Q4H PRN (RT), SIDDHARTH SykesO., 2.5 mg at 12/24/23 0639    normal saline (PF) 20 mL, 20 mL, Intravenous, Q12HRS, Keaton Pisano M.D., 20 mL at 12/25/23 3672    Past Medical/Surgical History:  Past Medical History:   Diagnosis Date    ADD (attention deficit disorder)     Alcohol abuse     Allergic rhinitis 05/21/2009    Anemia 09/2019    Anxiety 05/21/2009    Apnea, sleep     Arrhythmia     afib when hospitaized for infection    Back pain 05/21/2009    Bipolar disorder (HCC)     Bleeding ulcer 05/21/2009    Bleeding ulcer 05/21/2009    History of anemia-now on nexium     Bowel habit changes     constipation related to narcotics    Breath shortness     Congenital anomaly of mitral valve     Patient reports he only has 3 heart valves    Congestive heart failure (HCC)     Daytime sleepiness     Dental disorder     Missing teeth    Depression 05/21/2009    Eczema 05/21/2009    GERD (gastroesophageal reflux disease)     Heart burn     on meds    Heart murmur     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    Hemorrhagic disorder (HCC)     hx of bleeding ulcers    History of bleeding ulcers 02/12/2015    Hypertension 05/03/2021 12/7/23:  Patient states he has Never had high blood pressure. pt states well controlled on meds    Insomnia 05/21/2009    Intestinal hernia     Migraine 05/21/2009    Morning headache     Obesity, morbid ([facility]) 05/21/2009    Osteomyelitis ([facility]) 10/07/2019    Pain 09/2019    Chronic knee and back pain    Pneumonia     12/4/23:  Last time was around 1 year ago.    Pubic ramus fracture ([facility]) 09/28/2019    Rheumatoid arteritis ([facility]) 09/2019    knee, feet right hand    Sleep apnea 05/21/2009    Did not tolerate cpap, does not use it12/4/23:  Resolved with weight loss.    Snoring      Past Surgical History:    Procedure Laterality Date    KNEE AMPUTATION BELOW Left 12/16/2023    Procedure: debridement, secondary closure of left revision amputation;  Surgeon: You Olivares M.D.;  Location: [facility];  Service: Orthopedics    ORIF, ANKLE Right 12/6/2023    Procedure: RIGHT SUBTALAR DISLOCATION OPEN REDUCTION INTERNAL FIXATION, TALONAVICULAR OPEN REDUCTION INTERNAL FIXATION, ACHILLES TENDON LENGTHENING, TRIPLE ARTHRODESIS, RIGHT LOWER EXTREMITY APPLICATION OF MULTIPLANAR EXTERNAL FIXATOR;  Surgeon: Calvin Pryor M.D.;  Location: [facility];  Service: Orthopedics    INCISION AND DRAINAGE ORTHOPEDIC Left 10/22/2023    Procedure: REVISION, BELOW KNEE AMPUTATION;  Surgeon: River Colindres M.D.;  Location: [facility];  Service: Orthopedics    IRRIGATION & DEBRIDEMENT GENERAL Left 10/19/2023    Procedure: IRRIGATION AND DEBRIDEMENT, WOUND;  Surgeon: Jay Roe M.D.;  Location: [facility];  Service: Trauma    KNEE AMPUTATION BELOW Left 03/09/2023    Procedure: AMPUTATION, BELOW KNEE;  Surgeon: Wayne Chambers M.D.;  Location: [facility];  Service: Orthopedics    PB TOTAL KNEE ARTHROPLASTY Right 07/22/2020    Procedure: ARTHROPLASTY, KNEE, TOTAL;  Surgeon: Temo Pires M.D.;  Location: [facility];  Service: Orthopedics    TENDON TRANSFER Right 01/22/2020    Procedure: RIGHT DISTAL ULNA RESECTION AND EXTENSOR TENDON TRANSFER;  Surgeon: Marine Rushing M.D.;  Location: [facility];  Service: Orthopedics    OTHER ORTHOPEDIC SURGERY Right 2020    total right knee replacement    IRRIGATION & DEBRIDEMENT ORTHO Left 10/09/2019    Procedure: IRRIGATION AND DEBRIDEMENT, WOUND - PELVIC OSTEOMYELITIS;  Surgeon: You Olivares M.D.;  Location: [facility];  Service: Orthopedics    OTHER SURGICAL PROCEDURE  2019    osteomelitis of pelvis cleaned    GASTRIC BYPASS LAPAROSCOPIC  2000    Lucila en y    CHOLECYSTECTOMY  2000     OTHER SURGICAL PROCEDURE      repair of broken penis.  23:  Patient denies any surgery on penis.       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  23:  Patient declines smoking cessation information at this time.    Vaping Use    Vaping Use: Never used   Substance and Sexual Activity    Alcohol use: No     Alcohol/week: 0.0 oz     Comment: quit 2011 ago- past ETOH abuse    Drug use: Never    Sexual activity: Yes     Partners: Female     Comment: ;    Other Topics Concern     Service No    Blood Transfusions Yes    Caffeine Concern No    Occupational Exposure No    Hobby Hazards No    Sleep Concern No    Stress Concern Yes    Weight Concern Yes    Special Diet No    Back Care No    Exercise No    Bike Helmet No    Seat Belt Yes    Self-Exams Yes   Social History Narrative    , lives in [place]     Social Determinants of Health     Financial Resource Strain: Low Risk  (2023)    Overall Financial Resource Strain (CARDIA)     Difficulty of Paying Living Expenses: Not very hard   Food Insecurity: No Food Insecurity (2023)    Hunger Vital Sign     Worried About Running Out of Food in the Last Year: Never true     Ran Out of Food in the Last Year: Never true   Transportation Needs: Unmet Transportation Needs (10/30/2023)    PRAPARE - Transportation     Lack of Transportation (Medical): Yes     Lack of Transportation (Non-Medical): Yes   Physical Activity: Sufficiently  Active (9/7/2023)    Exercise Vital Sign     Days of Exercise per Week: 7 days     Minutes of Exercise per Session: 30 min   Stress: Stress Concern Present (9/7/2023)    [employer] [place] [employer] - Occupational Stress Questionnaire     Feeling of Stress : Rather much   Social Connections: Socially Integrated (9/7/2023)    Social Connection and Isolation Panel [NHANES]     Frequency of Communication with Friends and Family: More than three times a week     Frequency of Social Gatherings with Friends and Family: Twice a week     Attends Sabianism Services: More than 4 times per year     Active Member of Clubs or Organizations: Yes     Attends Club or Organization Meetings: More than 4 times per year     Marital Status:    Intimate Partner Violence: Not At Risk (9/7/2023)    Humiliation, Afraid, Rape, and Kick questionnaire     Fear of Current or Ex-Partner: No     Emotionally Abused: No     Physically Abused: No     Sexually Abused: No   Housing Stability: Low Risk  (9/7/2023)    Housing Stability Vital Sign     Unable to Pay for Housing in the Last Year: No     Number of Places Lived in the Last Year: 1     Unstable Housing in the Last Year: No       Family History:  Family History   Problem Relation Age of Onset    Hypertension Mother     Arthritis Mother     Depression Mother     Cancer Father         bladder    Schizophrenia Father     Cancer Maternal Grandmother         breast    Heart Disease Maternal Grandmother     Psychiatric Illness Other     Stroke Maternal Aunt     Other Neg Hx         no connective tissue disorders or known aortic disease       Physical Examination:     Physical Exam  Vitals reviewed.   Constitutional:       General: He is not in acute distress.     Appearance: Normal appearance. He is not ill-appearing.   HENT:      Head: Normocephalic and atraumatic.      Right Ear: External ear normal.      Left Ear: External ear normal.      Nose: Nose normal.      Mouth/Throat:       Pharynx: Oropharynx is clear.   Eyes:      General:         Right eye: No discharge.         Left eye: No discharge.      Extraocular Movements: Extraocular movements intact.      Conjunctiva/sclera: Conjunctivae normal.   Cardiovascular:      Rate and Rhythm: Normal rate and regular rhythm.   Pulmonary:      Effort: No respiratory distress.      Breath sounds: Rales present. No wheezing.   Abdominal:      General: Bowel sounds are normal. There is no distension.      Palpations: Abdomen is soft.      Tenderness: There is no abdominal tenderness.   Musculoskeletal:      Cervical back: Normal range of motion and neck supple.      Right lower leg: Edema present.      Comments: L BKA wound photos reviewed  RLE external fixator   Skin:     General: Skin is warm and dry.   Neurological:      Mental Status: He is alert and oriented to person, place, and time.         Laboratory Data:        Imaging:      Impressions/Recommendations:  Chronic use of opiate drug for therapeutic purpose  On MS Contin  Pain management per Physiatry    Atrial fibrillation with RVR (HCC)  Echo EF 45%, RVSP 66 mmHg, LAE, severe AS/MR  On no rate controlling agents  Monitor for tachydysrhythmias  Anticoagulated on Eliquis    Rheumatoid arthritis (HCC)  On Humira as outpt  Rheumatology F/U    Hx of gastric bypass  History noted    Hyponatremia  On NaCl  Closely follow electrolytes    Vitamin D deficiency  Vit D level 17  Start supplementation    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)  Echo EF 45%, RVSP 66 mmHg, LAE, severe AS/MR  On Farxiga and Aldactone    BKA stump complication (HCC)  Recurrent infxn S/P 2nd BKA revision on 12/16/23 by Dr. Olivares  On Cefepime through 1/23/24 per ID  WBC improving  Will check PCT  Wound care and pain control per Physiatry    Borderline abnormal TFTs  TSH 8.39 and FT4 1.33  Suspect subclinical hypothyroid  Outpt F/U    Anemia  Has normocytic indices  Check Fe Panel  Follow H/H    Aortic  stenosis due to bicuspid aortic valve  Echo EF 45, RVSP 66, lae, severe as, severe mr  TAVR next month per Cardiolgy consultation at [facility]    Acquired planovalgus deformity of right foot  S/P scheduled ORIF on 12/6/23 by Dr. Pryor  Wound care and pain control per Physiatry    Full Code    Thank you for the opportunity to assist in this patient's care.  We will continue to follow along with you.

## 2023-12-23 NOTE — THERAPY
Occupational Therapy   Initial Evaluation     Patient Name: Richard Hubbard II  Age:  48 y.o., Sex:  male  Medical Record #: [id number]  Today's Date: 12/23/2023     Subjective    Pt was supine in bed upon arrival. Pt agreeable for an OT initial eval.       Objective       12/23/23 0701   OT Charge Group   OT Self Care / ADL (Units) 1   OT Evaluation OT Evaluation High   OT Total Time Spent   OT Individual Total Time Spent (Mins) 60   Prior Living Situation   Prior Services Skilled Home Health Services   Housing / Facility 1 Story House   Steps Into Home   (Pt reports having ramp.)   Steps In Home 0   Rail None   Elevator No   Bathroom Set up Walk In Shower   Equipment Owned Grab Bar(s) In Tub / Shower;Wheelchair   Lives with - Patient's Self Care Capacity Spouse;Child Less than 18 Years of Age   Prior Level of ADL Function   Self Feeding Independent   Grooming / Hygiene Independent   Bathing Other (Comments)  (Set up of wound coverings but no assist needed once that was done.)   Dressing Independent   Toileting Independent   Prior Level of IADL Function   Medication Management Independent   Laundry Independent   Kitchen Mobility Independent   Finances Independent   Home Management Independent   Shopping Independent   Prior Level Of Mobility Independent With Device in Community;Independent With Device in Home   Driving / Transportation Relatives / Others Provide Transportation   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)   Leisure Interests Family;Sports;Television   Prior Functioning: Everyday Activities   Self Care Independent   Indoor Mobility (Ambulation) Independent   Functional Cognition Independent   Prior Device Use Manual wheelchair   Pain   Pain Scales 0 to 10 Scale    Intervention Emotional Support   Pain 0 - 10 Group   Location Leg   Location Orientation Right;Left   Pain Rating Scale (NPRS) 7   Description Aching   Comfort Goal Comfort with Movement   Therapist Pain Assessment  "Post Activity Pain Same as Prior to Activity   Cognition    Level of Consciousness Alert   Cognitive Pattern Assessment   Cognitive Pattern Assessment Used BIMS   Brief Interview for Mental Status (BIMS)   Repetition of Three Words (First Attempt) 3   Temporal Orientation: Year Correct   Temporal Orientation: Month Accurate within 5 days   Temporal Orientation: Day Correct   Recall: \"Sock\" Yes, no cue required   Recall: \"Blue\" Yes, no cue required   Recall: \"Bed\" Yes, no cue required   BIMS Summary Score 15   Confusion Assessment Method (CAM)   Is there evidence of an acute change in mental status from the patient's baseline? No   Inattention Behavior not present   Disorganized thinking Behavior not present   Altered level of consciousness Behavior not present   Vision Screen   Vision Not tested  (Pt wears glasses.)   Active ROM Upper Body   Active ROM Upper Body  WDL   Strength Upper Body   Upper Body Strength  WDL   Sensation Upper Body   Upper Extremity Sensation  WDL   Bed Mobility    Supine to Sit Maximal Assist   Coordination Upper Body   Coordination WDL   Eating   Assistance Needed Independent   CARE Score - Eating 6   Eating Discharge Goal   Discharge Goal 6   Oral Hygiene   Assistance Needed Set-up / clean-up   Physical Assistance Level No physical assistance   CARE Score - Oral Hygiene 5   Oral Hygiene Discharge Goal   Discharge Goal 6   Shower/Bathe Self   Assistance Needed Physical assistance   Physical Assistance Level 26%-50%   CARE Score - Shower/Bathe Self 3   Shower/Bathe Self Discharge Goal   Discharge Goal 5   Upper Body Dressing   Assistance Needed Physical assistance   Physical Assistance Level 25% or less   CARE Score - Upper Body Dressing 3   Upper Body Dressing Discharge Goal   Discharge Goal 6   Lower Body Dressing   Assistance Needed Physical assistance   Physical Assistance Level Total assistance   CARE Score - Lower Body Dressing 1   Lower Body Dressing Discharge Goal   Discharge Goal 4 "   Putting On/Taking Off Footwear   Reason if not Attempted Medical concerns   CARE Score - Putting On/Taking Off Footwear 88   Putting On/Taking Off Footwear Discharge Goal   Discharge Goal 88   Toileting Hygiene   Assistance Needed Physical assistance   Physical Assistance Level 51%-75%   CARE Score - Toileting Hygiene 2   Toileting Hygiene Discharge Goal   Discharge Goal 4   Toilet Transfer   Reason if not Attempted Safety concerns   CARE Score - Toilet Transfer 88   Toilet Transfer Discharge Goal   Discharge Goal 4   Hearing, Speech, and Vision   Ability to Hear Adequate   Ability to See in Adequate Light Adequate   Expression of Ideas and Wants Without difficulty   Understanding Verbal and Non-Verbal Content Understands   Functional Level of Assist   Eating Independent   Grooming Standby Assist   Grooming Description Increased time;Initial preparation for task;Set-up of equipment;Supervision for safety   Bathing Moderate Assist   Bathing Description Assit wtih lower extremities;Assit with perineal;Increased time;Initial preparation for task;Set-up of equipment;Supervision for safety  (Bed bath)   Upper Body Dressing Minimal Assist   Upper Body Dressing Description Set-up of equipment;Increased time;Initial preparation for task;Assist with closures   Lower Body Dressing Total Assist   Lower Body Dressing Description Increased time;Initial preparation for task;Set-up of equipment;Supervision for safety;Assist with threading into pant leg  (Undergarment.)   Toileting Moderate Assist   Toileting Description Assist for hygiene;Increased time;Initial preparation for task;Set-up of equipment;Supervision for safety   Bed, Chair, Wheelchair Transfer   (Not tested as pt is NWB LLE and NWB/TTWB RLE.)   Toilet Transfers   (Not tested due to medical/safety concerns.)   Tub / Shower Transfers   (Not tested due to medical/safety concerns.)   Comprehension Independent   Expression Independent   Problem Solving Modified  Independent   Memory Independent   Problem List   Problem List Decreased Active Daily Living Skills;Decreased Homemaking Skills;Decreased Functional Mobility;Decreased Activity Tolerance;Other (Comments)  (Generalized Weakness.)   Precautions   Precautions Fall Risk;Non Weight Bearing Left Lower Extremity;Toe Touch Weight Bearing Right Lower Extremity   Comments External fixator to RLE.   Current Discharge Plan   Current Discharge Plan Return to Prior Living Situation   Benefit    Therapy Benefit Patient Would Benefit from Inpatient Rehab Occupational Therapy to Maximize [place] with ADLs, IADLs and Functional Mobility.   Interdisciplinary Plan of Care Collaboration   IDT Collaboration with  Physical Therapist   Patient Position at End of Therapy In Bed;Bed Alarm On;Call Light within Reach;Tray Table within Reach;Phone within Reach   OT DME Recommendations   Bathroom Equipment Drop Arm Commode   Strengths & Barriers   Strengths Able to follow instructions;Effective communication skills;Independent prior level of function;Pleasant and cooperative;Supportive family   Barriers Decreased endurance;Fatigue;Generalized weakness;Impaired activity tolerance;Impaired balance;Limited mobility;Pain       Assessment    PMHX includes:       Active Hospital Problems     Diagnosis      *Unilateral complete BKA, left, initial encounter ([facility])      Hypotension      BKA stump complication ([facility])      Bacteremia due to Staphylococcus aureus      Anemia      Chronic diastolic heart failure with preserved ejection fraction ([facility])      CHF (congestive heart failure), NYHA class I, acute on chronic, combined ([facility])      Acute combined systolic and diastolic heart failure ([facility])      Acute kidney injury ([facility])      Hyponatremia      Chronic use of opiate drug for therapeutic purpose      Atrial fibrillation with RVR ([facility])      Aortic root dilatation ([facility])      Chronic pain of right knee          Left residual limb infection and  osteomyelitis requiring BKA revision:    -Nonweightbearing left lower extremity       Multiple right lower extremity surgical interventions: open reduction of the right subtalar joint dislocation, right talonavicular joint dislocation right foot triple arthrodesis, right extensor tendon lengthening and application of multiplanar external fixator on the right lower extremity on 12/6.   -Right lower extremity external fixator  -Toe-touch weightbearing on right lower extremity for transfers only   Additional factors influencing patient status / progress (ie: cognitive factors, co-morbidities, social support, etc).    Plan  Recommend Occupational Therapy  30/60/90  minutes per day 5-7 days per week for 3-4weeks for the following treatments:  OT Self Care/ADL, OT Community Reintegration, OT Therapeutic Activity, OT Evaluation, and OT Therapeutic Exercise.    Passport items to be completed:  Perform bathroom transfers, complete dressing, complete feeding, get ready for the day, prepare a simple meal, participate in household tasks, adapt home for safety needs, demonstrate home exercise program, complete caregiver training     Goals:  Long term and short term goals have been discussed with patient and they are in agreement.    Occupational Therapy Goals (Active)       Problem: Bathing       Dates: Start:  12/23/23         Goal: STG-Within one week, patient will bathe with Min A.       Dates: Start:  12/23/23               Problem: Dressing       Dates: Start:  12/23/23         Goal: STG-Within one week, patient will dress UB with SBA.       Dates: Start:  12/23/23            Goal: STG-Within one week, patient will dress LB with Max/Mod A.       Dates: Start:  12/23/23               Problem: Functional Transfers       Dates: Start:  12/23/23         Goal: STG-Within one week, patient will transfer to toilet with Max A.       Dates: Start:  12/23/23               Problem: OT Long Term Goals       Dates: Start:  12/23/23          Goal: LTG-By discharge, patient will complete basic self care tasks with CGA/Supervision.       Dates: Start:  12/23/23            Goal: LTG-By discharge, patient will perform bathroom transfers with CGA.       Dates: Start:  12/23/23

## 2023-12-23 NOTE — DISCHARGE SUMMARY
Discharge Summary    CHIEF COMPLAINT ON ADMISSION  No chief complaint on file.      Reason for Admission  Right foot pain    Admission Date  12/6/2023     CODE STATUS  Full Code    HPI & HOSPITAL COURSE  This is a 48-year-old male with a past medical history significant for moderate AS, bicuspid aortic valve, mildly to moderate pulmonary hypertension with RSVP of 50, history of gastric bypass, history of rheumatoid arthritis on Humira, left BKA stump abscess/osteomyelitis complicated with MSSA bacteremia on 10/23. Patient was supposed to be on Ancef daily 12/1 and continue Augmentin 875/125 1 tablet p.o. twice daily for 2 weeks per ID recs     He was admitted on 12/6 by orthopedic surgery and he was noted to have progressive flatfoot on the right side.  He underwent open reduction of right subtalar joint dislocation, right talonavicular joint dislocation right foot triple arthrodesis, right extensor tendon lengthening and application of multiplanar external fixator right lower extremity on 12/6.     Per orthopedic surgery, patient will be nonweightbearing to toe-touch weightbearing for transfer on the right lower extremity. On 12/8, Response was called, found to be in A-fib with RVR.       Of note patient was noted to be in cardiogenic shock on pressures, echocardiogram showed EF 45%, severely AAS, moderate AI, moderate to severe TR, RSVP 66. Cardiology was consulted, Patient underwent cardiac cath on 12/20.  Currently patient is not on any BB/ACE/ARB considering him being hypotensive.  Also he will continue eliquis  5mg po bid  for secondary hypercoagulable state 2/ a-fib, currently on sinus. Cardiology plans to do TAVR next month as an op. Cardiology will follow him as an op.     Of note patient has infected left BKA wound, he underwent Reamputation of left leg below the knee, patient is nonweightbearing on the left lower extremity. Culture growing Serratia marcescens.    Patient was started on ceftriaxone plus  Zyvox since 12/9-12/13 and changed to Cefepime on 12/13.  ID was consulted, recommended patient will need 6 weeks of IV Invanz, last day being 1/23/2024.  While patient was on IV antibiotics, needs CBC with differential and CMP every week. On discharge, patient will continue Iv Invanz 1 Gm daily.     At this time patient is medically stable to be discharged to acute rehab    Therefore, he is discharged in good and stable condition to an inpatient rehabilitation hospital.    The patient met 2-midnight criteria for an inpatient stay at the time of discharge.      FOLLOW UP ITEMS POST DISCHARGE  Johnathan Dover M.D.  Please follow up with orthopedic surgery    DISCHARGE DIAGNOSES  Principal Problem:    Cardiogenic shock (HCC) (POA: Unknown)  Active Problems:    S/P BKA (below knee amputation), left (HCC) (POA: Yes)    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) (POA: Yes)    Aortic stenosis due to bicuspid aortic valve (POA: Yes)    Sepsis (HCC) (POA: Yes)    Hyponatremia (POA: Yes)    Hypotension (POA: Unknown)    Atrial fibrillation with RVR (HCC) (POA: Yes)    Rheumatoid arthritis (HCC) (Chronic) (POA: Yes)      Overview: On Humira    Acute kidney injury (HCC) (POA: Yes)    Hypokalemia (POA: Yes)    Dislocation of right subtalar joint (POA: Unknown)    Immunosuppression (HCC) (POA: Unknown)    Elevated LFTs (POA: Unknown)    Pulmonary hypertension (HCC) (POA: Unknown)  Resolved Problems:    Chronic pain (POA: Yes)      FOLLOW UP  Future Appointments   Date Time Provider Department Center   12/23/2023  7:00 AM Melva Cooper OT [facility] None   12/23/2023  9:00 AM Melva Cooper OT [facility] None   12/23/2023  9:30 AM Melita Duckworth DPT [facility] None   12/23/2023  2:00 PM Melita Duckworth DPT [facility] None   1/2/2024 10:30 AM MARTÍN Vigil None     No follow-up provider specified.    MEDICATIONS ON DISCHARGE     Medication List        START taking these medications        Instructions   apixaban 5mg  Tabs  Commonly known as: Eliquis   Take 1 Tablet by mouth 2 times a day. Indications: Thromboembolism secondary to Atrial Fibrillation  Dose: 5 mg     dronabinol 2.5 MG Caps  Commonly known as: Marinol   Take 1 Capsule by mouth 2 times a day as needed (appetite) for up to 10 days.  Dose: 2.5 mg     polyethylene glycol/lytes Pack  Commonly known as: Miralax   Take 1 Packet by mouth 1 time a day as needed (if sennosides and docusate ineffective after 24 hours).  Dose: 17 g     senna-docusate 8.6-50 MG Tabs  Commonly known as: Pericolace Or Senokot S   Take 2 Tablets by mouth 2 times a day.  Dose: 2 Tablet     spironolactone 25 MG Tabs  Start taking on: December 23, 2023  Commonly known as: Aldactone   Take 1 Tablet by mouth every day.  Dose: 25 mg            CHANGE how you take these medications        Instructions   Naloxone 4 MG/0.1ML Liqd  What changed:   how much to take  how to take this  when to take this  Commonly known as: Narcan   One spray in one nostril for overdose and call 911.            CONTINUE taking these medications        Instructions   Acetaminophen Extra Strength 500 MG Tabs   Take 2 Tablets by mouth in the morning, at noon, and at bedtime.  Dose: 1,000 mg     albuterol 108 (90 Base) MCG/ACT Aers inhalation aerosol   Inhale 2 Puffs every 6 hours as needed for Shortness of Breath.  Dose: 2 Puff     D3-1000 1000 UNIT Tabs  Generic drug: vitamin D   Take 1 Tablet by mouth every day.  Dose: 1,000 Units     dapagliflozin propanediol 10 MG Tabs  Commonly known as: Farxiga   Take 1 Tablet by mouth every day for 90 days.  Dose: 10 mg     DULoxetine 60 MG Cpep delayed-release capsule  Commonly known as: Cymbalta   Take 1 Capsule by mouth every day.  Dose: 60 mg     * MORPHINE SULFATE ER PO   Take 15 mg by mouth 2 times a day.  Dose: 15 mg     * morphine 30 MG tablet  Commonly known as: MS IR   Take 30 mg by mouth 4 times a day.  Dose: 30 mg     Movantik 25 MG Tabs  Generic drug: Naloxegol Oxalate   Take  "25 mg by mouth every day.  Dose: 25 mg     multivitamin Tabs   Take 1 Tablet by mouth every day.  Dose: 1 Tablet     omeprazole 20 MG delayed-release capsule  Commonly known as: PriLOSEC   Take 1 Capsule by mouth every day.  Dose: 20 mg           * This list has 2 medication(s) that are the same as other medications prescribed for you. Read the directions carefully, and ask your doctor or other care provider to review them with you.                STOP taking these medications      amoxicillin-clavulanate 875-125 MG Tabs  Commonly known as: Augmentin     fluconazole 100 MG Tabs  Commonly known as: Diflucan     furosemide 20 MG Tabs  Commonly known as: Lasix     Humira Pen 40 MG/0.4ML Pnkt  Generic drug: Adalimumab     hydrOXYzine pamoate 25 MG Caps  Commonly known as: Vistaril     methylPREDNISolone 4 MG Tabs  Commonly known as: Medrol     oxyCODONE immediate release 10 MG immediate release tablet  Commonly known as: Roxicodone              Allergies  Allergies   Allergen Reactions    Nsaids      Bleeding, \"I had a bleeding ulcer\"    Diphenhydramine Unspecified     \"I get agitated\"    Mirtazapine Unspecified     I had a hard time waking up, lacking mental focus.     Penicillins      \"Had some dizziness\"  Tolerated Unasyn 10/2019  Tolerated Zosyn 02/2023    Promethazine Unspecified     \"I get very agitated\"       DIET  Orders Placed This Encounter   Procedures    Diet Order Diet: Cardiac     Standing Status:   Standing     Number of Occurrences:   1     Order Specific Question:   Diet:     Answer:   Cardiac [6]       ACTIVITY  As tolerated and directed by rehab.  No weight bearing in the left lower ext    LINES, DRAINS, AND WOUNDS  This is an automated list. Peripheral IVs will be removed prior to discharge.  Peripheral IV 12/20/23 20 G Anterior;Right Forearm (Active)   Site Assessment Clean;Intact;Dry 12/22/23 1000   Dressing Type Transparent 12/22/23 1000   Line Status Flushed;Scrubbed the hub prior to " access;Saline locked 12/22/23 1000   Dressing Status Clean;Dry;Intact 12/22/23 1000   Dressing Intervention N/A 12/21/23 1900   [place] [facility] [place] 12/21/23 1900   Phlebitis Scale ([facility] Only) 0 12/21/23 1900       Peripheral IV 12/20/23 20 G Anterior;Left Forearm (Active)   Site Assessment Clean;Dry;Intact 12/22/23 1000   Dressing Type Transparent 12/22/23 1000   Line Status Saline locked;Scrubbed the hub prior to access;Flushed 12/22/23 1000   Dressing Status Clean;Dry;Intact 12/22/23 1000   Dressing Intervention N/A 12/21/23 1900       PICC Single Lumen 12/21/23 Left Brachial (Active)   Site Assessment Clean;Dry;Intact 12/21/23 1900   Line Status Blood return noted;Flushed;Saline locked 12/22/23 1000   Line Secured at (cm) 0 cm 12/21/23 1523   Extremity Circumference (cm) 32 cm 12/21/23 1523   Dressing Type Biopatch;Securing device;Skin barrier;Transparent 12/22/23 1000   Dressing Status Clean;Dry;Intact 12/22/23 1000   Dressing Intervention Initial dressing 12/22/23 1000   Dressing Change Due 12/23/23 12/21/23 1523   Date IV Connector(s) Changed 12/21/23 12/21/23 1523   NEXT IV Connector(s) Change 12/23/23 12/21/23 1523   Line Necessity Assessed Antibiotic Therapy Greater than 7 Days 12/21/23 1523   $ Single Lumen PICC Charge Single kit used 12/21/23 1523       Moisture Associated Skin Damage 08/20/23 Groin;Buttock;Perineum;Thigh (Active)       Moisture Associated Skin Damage 12/20/23 Buttock (Active)   NEXT Weekly Photo (Inpatient Only) 12/27/23 12/20/23 1500   Drainage Amount None 12/20/23 1500   Periwound Assessment Rash;Red 12/20/23 1500   IAD Cleansing Foam Cleanser/Washcloth 12/20/23 1500   Periwound Protectant Antifungal Therapy 12/20/23 1500   WOUND NURSE ONLY - Time Spent with Patient (mins) 30 12/20/23 1500       Wound 08/20/23 Diabetic Ulcer Plantar Right midfoot (Active)       Wound 10/19/23 Incision Leg Left (Active)       Wound 10/27/23 Elbow Left Left arm swelling (Active)        Wound 10/27/23 Plantar Right Plantar wound (Active)       Wound 12/06/23 Incision Foot Right Adaptic, 4x4 gauze, soft roll, ace wrap, external fixator (Active)   Wound Image    12/10/23 2200   Site Assessment Clean;Dry 12/19/23 2000   Periwound Assessment Clean;Dry;Intact 12/19/23 2000   Margins JI 12/20/23 0200   Closure Sutures 12/19/23 2000   Drainage Amount None 12/19/23 2000   Drainage Description Serosanguineous 12/15/23 0800   Treatments Site care 12/18/23 1600   Wound Cleansing Foam Cleanser/Washcloth 12/18/23 1600   Dressing Status Open to Air 12/21/23 0900   Dressing Changed Changed 12/15/23 0800   Dressing Cleansing/Solutions Normal Saline 12/15/23 2000   Dressing Options Petrolatum Gauze (yellow) 12/18/23 1600   Dressing Change/Treatment Frequency Daily, and As Needed 12/16/23 1800       Wound 12/06/23 Full Thickness Wound Leg Left chronic wound (Active)   Wound Image    12/13/23 1600   Site Assessment JI 12/18/23 2000   Periwound Assessment JI 12/18/23 2000   Margins JI 12/18/23 2000   Closure JI 12/16/23 1730   Drainage Amount None 12/16/23 1730   Drainage Description Serous 12/15/23 0800   Treatments Offloading 12/16/23 1606   Offloading/DME Other (comment) 12/13/23 0844   Wound Cleansing Approved Wound Cleanser 12/15/23 2000   Periwound Protectant Barrier Paste 12/15/23 2000   Dressing Status Dry;Intact;Clean 12/16/23 1730   Dressing Changed Changed 12/18/23 1600   Dressing Cleansing/Solutions 1/4 Strength Dakin's Solution 12/15/23 2000   Dressing Options Ace Wrap;Dry Gauze;Other (Comments) 12/16/23 1730   Dressing Change/Treatment Frequency Every Shift, and As Needed 12/16/23 1800   NEXT Dressing Change/Treatment Date 12/16/23 12/15/23 2000   NEXT Weekly Photo (Inpatient Only) 12/20/23 12/13/23 1600   Wound Team Following Weekly 12/13/23 1600   Non-staged Wound Description Full thickness 12/13/23 1600   Wound Length (cm) 1.1 cm 12/13/23 1600   Wound Width (cm) 1.2 cm 12/13/23 1600   Wound  Depth (cm) 0.8 cm 12/13/23 1600   Wound Surface Area (cm^2) 1.32 cm^2 12/13/23 1600   Wound Volume (cm^3) 1.056 cm^3 12/13/23 1600   Undermining (cm) 1.9 cm 12/13/23 1600   Undermining of Wound, 1st Location From 12 o'clock;To 12 o'clock 12/13/23 1600   Shape Round 12/13/23 1600   Wound Odor None 12/13/23 1600   WOUND NURSE ONLY - Time Spent with Patient (mins) 30 12/13/23 1600       Wound 12/13/23 Partial Thickness Wound Sacrum r/t friction (Active)   Wound Image    12/13/23 1600   Site Assessment Clean;[place] 12/19/23 1400   Periwound Assessment Pink 12/18/23 2000   Margins Defined edges;Attached edges 12/18/23 2000   Closure Secondary intention 12/18/23 2000   Drainage Amount Scant 12/17/23 2000   Drainage Description Tan 12/17/23 2000   Treatments Offloading 12/18/23 2000   Wound Cleansing Foam Cleanser/Washcloth 12/18/23 1600   Periwound Protectant Moisture Barrier 12/19/23 1400   Dressing Status Clean;Dry;Intact 12/19/23 1400   Dressing Changed Observed 12/19/23 1400   Dressing Options Offloading Dressing - Sacral 12/18/23 2000   Dressing Change/Treatment Frequency Every 72 hrs, and As Needed 12/18/23 2000   NEXT Dressing Change/Treatment Date 12/16/23 12/15/23 2000   NEXT Weekly Photo (Inpatient Only) 12/20/23 12/13/23 1600   Wound Team Following Not following 12/13/23 1600   Non-staged Wound Description Partial thickness 12/13/23 1600       Wound 12/18/23 Pressure Injury Sacrum Left 12/20 DTI (Active)   Wound Image   12/20/23 1500   Site Assessment Purple 12/20/23 1500   Periwound Assessment Red 12/20/23 1500   Margins Attached edges;Defined edges 12/20/23 1500   Closure None 12/20/23 1500   Drainage Amount None 12/20/23 1500   Treatments Cleansed;Site care;Offloading 12/20/23 1500   Wound Cleansing Foam Cleanser/Washcloth 12/20/23 1500   Periwound Protectant Moisture Barrier 12/20/23 1500   Dressing Status Clean;Dry;Intact 12/20/23 1500   Dressing Changed Changed 12/20/23 1500   Dressing Cleansing/Solutions  Not Applicable 12/20/23 1500   Dressing Options Offloading Dressing - Sacral 12/20/23 1500   Dressing Change/Treatment Frequency Every 72 hrs, and As Needed 12/20/23 1500   NEXT Dressing Change/Treatment Date 12/23/23 12/20/23 1500   NEXT Weekly Photo (Inpatient Only) 12/27/23 12/20/23 1500   Wound Team Following Weekly 12/20/23 1500   WOUND NURSE ONLY - Pressure Injury Stage DTPI 12/20/23 1500   Wound Length (cm) 0.6 cm 12/20/23 1500   Wound Width (cm) 1.5 cm 12/20/23 1500   Wound Depth (cm) 0 cm 12/20/23 1500   Wound Surface Area (cm^2) 0.9 cm^2 12/20/23 1500   Wound Volume (cm^3) 0 cm^3 12/20/23 1500   Shape Irregular 12/20/23 1500   Wound Odor None 12/20/23 1500   WOUND NURSE ONLY - Time Spent with Patient (mins) 30 12/20/23 1500      PICC Single Lumen 12/21/23 Left Brachial (Active)   Site Assessment Clean;Dry;Intact 12/21/23 1900   Line Status Blood return noted;Flushed;Saline locked 12/22/23 1000   Line Secured at (cm) 0 cm 12/21/23 1523   Extremity Circumference (cm) 32 cm 12/21/23 1523   Dressing Type Biopatch;Securing device;Skin barrier;Transparent 12/22/23 1000   Dressing Status Clean;Dry;Intact 12/22/23 1000   Dressing Intervention Initial dressing 12/22/23 1000   Dressing Change Due 12/23/23 12/21/23 1523   Date IV Connector(s) Changed 12/21/23 12/21/23 1523   NEXT IV Connector(s) Change 12/23/23 12/21/23 1523   Line Necessity Assessed Antibiotic Therapy Greater than 7 Days 12/21/23 1523   $ Single Lumen PICC Charge Single kit used 12/21/23 1523     Peripheral IV 12/20/23 20 G Anterior;Right Forearm (Active)   Site Assessment Clean;Intact;Dry 12/22/23 1000   Dressing Type Transparent 12/22/23 1000   Line Status Flushed;Scrubbed the hub prior to access;Saline locked 12/22/23 1000   Dressing Status Clean;Dry;Intact 12/22/23 1000   Dressing Intervention N/A 12/21/23 1900   Infil[place] Grading ([facility] [place] 0 12/21/23 1900   Phlebitis Scale ([facility] Only) 0 12/21/23 1900       Peripheral IV 12/20/23 20 G  Anterior;Left Forearm (Active)   Site Assessment Clean;Dry;Intact 12/22/23 1000   Dressing Type Transparent 12/22/23 1000   Line Status Saline locked;Scrubbed the hub prior to access;Flushed 12/22/23 1000   Dressing Status Clean;Dry;Intact 12/22/23 1000   Dressing Intervention N/A 12/21/23 1900               MENTAL STATUS ON TRANSFER             CONSULTATIONS  Cardiology   Ortho  ID  Critical care      PROCEDURES  On 12/6:  Open reduction of right subtalar joint dislocation  Open reduction of right talonavicular joint dislocation  Right foot triple arthrodesis  Right Achilles tendon lengthening  Application of multiplanar external fixator right lower extremity     On 12/16:Reamputation left leg below the kne     Om 12/20: 1.  Normal-appearing epicardial coronary arteries  2.  Normal-appearing bilateral iliac and common femoral arteries  3.  Severe aortic valve insufficiency  4.  Dilated aortic root and ascending aorta, at least 4cm       LABORATORY  Lab Results   Component Value Date    SODIUM 129 (L) 12/22/2023    POTASSIUM 3.8 12/22/2023    CHLORIDE 99 12/22/2023    CO2 19 (L) 12/22/2023    GLUCOSE 81 12/22/2023    BUN 19 12/22/2023    CREATININE 0.72 12/22/2023    CREATININE 0.95 10/19/2010    GLOMRATE >59 10/19/2010        Lab Results   Component Value Date    WBC 12.2 (H) 12/22/2023    WBC 8.4 10/19/2010    HEMOGLOBIN 8.0 (L) 12/22/2023    HEMATOCRIT 26.5 (L) 12/22/2023    PLATELETCT 210 12/22/2023        Total time of the discharge process exceeds 39 minutes.

## 2023-12-23 NOTE — DISCHARGE PLANNING
MSW received call from Tele 7 charge stating [facility] is at bedside to take pt to [facility]. MSW called TCN for [facility] Ayaz to clarify. Ayaz confirmed that they did accept pt back with Dr. Khan and Jessica can d/c to [facility]. Tele 7 charge to go forward and sent pt with [facility] to [facility]. Ayaz asked report be completed by bedside RN. MSW met with pt and he agreeable to return to [facility].COBRA given to [facility].

## 2023-12-23 NOTE — FLOWSHEET NOTE
12/22/23 1724   Events/Summary/Plan   Events/Summary/Plan RT Assessment   Vital Signs   Pulse 96   Respiration (!) 24   Pulse Oximetry 96 %   $ Pulse Oximetry (Spot Check) Yes   Respiratory Assessment   Respiratory Pattern Within Normal Limits   Level of Consciousness Alert   Chest Exam   Work Of Breathing / Effort Mild   Oxygen   O2 (LPM) 4   O2 Delivery Device Nasal Cannula   Smoking History   Have you ever smoked Yes   Have you smoked in the last 12 months Yes   Have you quit smoking Yes   Confirm Quit Date 09/27/23   Smoking Cessation Offered Patient Counseled

## 2023-12-23 NOTE — FLOWSHEET NOTE
12/23/23 1516   Events/Summary/Plan   Events/Summary/Plan SVN   Vital Signs   Pulse 88  (post 86)   Respiration 18  (post 18)   Pulse Oximetry 96 %   $ Pulse Oximetry (Spot Check) Yes   Respiratory Assessment   Level of Consciousness Alert   Chest Exam   Work Of Breathing / Effort Mild   Breath Sounds   RUL Breath Sounds Clear  (increased aeration T/O)   RML Breath Sounds Diminished   RLL Breath Sounds Diminished   DONALD Breath Sounds Clear   LLL Breath Sounds Diminished   Oxygen   O2 (LPM) 4  (titrated 3L)   O2 Delivery Device Oxymask

## 2023-12-23 NOTE — CARE PLAN
"The patient is Stable - Low risk of patient condition declining or worsening    Shift Goals  Clinical Goals: safety; pain management    Progress made toward(s) clinical / shift goals:      Problem: Fall Risk - Rehab  Goal: Patient will remain free from falls  Outcome: Progressing  Note: Osiris Healy Fall risk Assessment Score: 15    High fall risk Interventions   - Alarming seatbelt  - Bed and strip alarm   - Yellow sign by the door   - Yellow wrist band \"Fall risk\"  - Room near to the nurse station  - Do not leave patient unattended in the bathroom  - Fall risk education provided       Problem: Pain - Standard  Goal: Alleviation of pain or a reduction in pain to the patient’s comfort goal  Outcome: Progressing  Note: Pain meds given as scheduled and PRN per MAR for c/o constant right leg pain with moderate relief. Pt is requesting to have his oxycodone !R 15mg PRN continued as his breakthrough pain med while in rehab. Informed Dr Khan(on call MD) and gave pt one time [id number]  dose for tonight. Dr Khan will talk with pt again about his pain meds in am. Will continue to monitor.       Patient is not progressing towards the following goals:      "

## 2023-12-23 NOTE — THERAPY
Occupational Therapy  Daily Treatment     Patient Name: Richard Hubbard II  Age:  48 y.o., Sex:  male  Medical Record #: [id number]  Today's Date: 12/23/2023     Precautions  Precautions: (P) Fall Risk, Non Weight Bearing Left Lower Extremity, Toe Touch Weight Bearing Right Lower Extremity  Comments: (P) External fixator to RLE.         Subjective    Pt agreeable for an OT tx session.     Objective       12/23/23 0901   OT Charge Group   OT Therapeutic Exercise (Units) 2   OT Total Time Spent   OT Individual Total Time Spent (Mins) 30   Precautions   Precautions Fall Risk;Non Weight Bearing Left Lower Extremity;Toe Touch Weight Bearing Right Lower Extremity   Comments External fixator to RLE.   Pain   Pain Scales 0 to 10 Scale    Intervention Emotional Support   Pain 0 - 10 Group   Location Leg   Location Orientation Right;Left   Pain Rating Scale (NPRS) 7   Therapist Pain Assessment Post Activity Pain Same as Prior to Activity   Supine Upper Body Exercises   Front Arm Raise 3 sets of 10;Bilateral;Weight (See Comments for lbs)  (2 lb dumbbell.)   Bicep Curl 3 sets of 10;Bilateral  (2 lb dumbbell.)   Other Exercise wrist curls,pronation/supination 3 sets x 10 reps.  (2 lb dumbbell.)   Interdisciplinary Plan of Care Collaboration   IDT Collaboration with  Physical Therapist   Patient Position at End of Therapy In Bed;Bed Alarm On;Tray Table within Reach;Phone within Reach;Call Light within Reach       Therapeutic ex's performed to increase strength for improved ADL's and functional t/f's.  Assessment    Pt needed several restbreaks. No new increased c/o pain or discomfort during session.  Strengths: Able to follow instructions, Effective communication skills, Independent prior level of function, Pleasant and cooperative, Supportive family  Barriers: Decreased endurance, Fatigue, Generalized weakness, Impaired activity tolerance, Impaired balance, Limited mobility, Pain    Plan    Will continue with OT  POC.    DME  OT DME Recommendations  Bathroom Equipment: Drop Arm Commode    Passport items to be completed:      Occupational Therapy Goals (Active)       Problem: Bathing       Dates: Start:  12/23/23         Goal: STG-Within one week, patient will bathe with Min A.       Dates: Start:  12/23/23               Problem: Dressing       Dates: Start:  12/23/23         Goal: STG-Within one week, patient will dress UB with SBA.       Dates: Start:  12/23/23            Goal: STG-Within one week, patient will dress LB with Max/Mod A.       Dates: Start:  12/23/23               Problem: Functional Transfers       Dates: Start:  12/23/23         Goal: STG-Within one week, patient will transfer to toilet with Max A.       Dates: Start:  12/23/23               Problem: OT Long Term Goals       Dates: Start:  12/23/23         Goal: LTG-By discharge, patient will complete basic self care tasks with CGA/Supervision.       Dates: Start:  12/23/23            Goal: LTG-By discharge, patient will perform bathroom transfers with CGA.       Dates: Start:  12/23/23

## 2023-12-23 NOTE — FLOWSHEET NOTE
12/23/23 1006   Events/Summary/Plan   Events/Summary/Plan SVN   Vital Signs   Pulse 94  (post 103)   Respiration 20  (post 22)   Pulse Oximetry 97 %   $ Pulse Oximetry (Spot Check) Yes   Respiratory Assessment   Level of Consciousness Alert   Chest Exam   Work Of Breathing / Effort Mild   Breath Sounds   RUL Breath Sounds Diminished  (increased aeration)   RML Breath Sounds Diminished   RLL Breath Sounds Diminished   DONALD Breath Sounds Diminished   LLL Breath Sounds Diminished   Secretions   Cough Productive   How Sputum Obtained Expectorated;Spontaneous   Sputum Amount Small   Sputum Color Bloody   Sputum Consistency Thin   Oxygen   O2 (LPM) 4   O2 Delivery Device Silicone Nasal Cannula  (mouth breather, switched over to oxymask)

## 2023-12-23 NOTE — PROGRESS NOTES
"  Physical Medicine & Rehabilitation Progress Note    Encounter Date: 12/23/2023    Chief Complaint: Severe pain in bilateral lower extremities    Interval Events (Subjective):    Patient was evaluated in his room, notified by nursing that patient was having worsening difficulty with breathing on evaluation therapy was comfortable getting patient up and working with him in the setting of current respiratory status.  He does complain of shortness of breath but is more concerned about the pain.    He complains of significant pain and associates this with medications of home occasion, these were changed on admission to [facility].  Usually takes MS Contin 15 mg every 12 hours and morphine IR 30 mg every 4 hours scheduled and previously received oxycodone and as as needed medication without difficulty.    He is also very concerned about his lack of appetite.  He would prefer to transition to regular diet, not eating any of that cardiac diet.      ROS:  Gen: No fatigue, confusion, significant weight loss  Eyes: no blurry vision, double vision or pain in eyes  ENT: no changes in hearing, runny nose, nose bleeds, sinus pain  CV: No CP, palpitations,   Lungs: + shortness of breath, no significant changes in secretions, worsening of cough,no pain with coughing  Abd: No abd pain, nausea, vomiting, pain with eating  : no blood in urine, pain during storage phase, bladder spasms, suprapubic pain  Neuro: no changes in strength or sensation  Skin: no new ulcers/skin breakdown appreciated by patient  Mood: No changes in mood today, increase in depression or anxiety  Heme: no bruising, or bleeding    Objective:  Vitals: /68   Pulse 94   Temp 36.4 °C (97.6 °F) (Oral)   Resp 20   Ht 1.803 m (5' 11\")   Wt 97.5 kg (214 lb 15.2 oz)   SpO2 97%   Gen: NAD, Body mass index is 29.98 kg/m².  HEENT:  NC/AT, PERRLA, moist mucous membranes  Cardio: RRR, no mumurs  Pulm: Poor respiratory effort minimal breath sounds at bilateral " bases, crackles present throughout rest of lungs  Abd: Soft NTND, active bowel sounds,   Ext:  No calf tenderness. No clubbing/cyanosis.  Positive dorsal pedis pulse on the right lower extremity, pin sites are clean dry and intact no erythema appreciated, swelling present  Left BKA incision is clean dry and intact    Laboratory Values:  Recent Results (from the past 72 hour(s))   aPTT    Collection Time: 12/20/23  1:50 PM   Result Value Ref Range    APTT 27.1 24.7 - 36.0 sec   Prothrombin Time    Collection Time: 12/20/23  1:50 PM   Result Value Ref Range    PT 16.4 (H) 12.0 - 14.6 sec    INR 1.31 (H) 0.87 - 1.13   Heparin Xa (Unfractionated)    Collection Time: 12/20/23  1:50 PM   Result Value Ref Range    Heparin Xa (UFH) <0.10 IU/mL   Complete Metabolic Panel (CMP)    Collection Time: 12/20/23  1:50 PM   Result Value Ref Range    Sodium 130 (L) 135 - 145 mmol/L    Potassium 3.9 3.6 - 5.5 mmol/L    Chloride 96 96 - 112 mmol/L    Co2 21 20 - 33 mmol/L    Anion Gap 13.0 7.0 - 16.0    Glucose 108 (H) 65 - 99 mg/dL    Bun 20 8 - 22 mg/dL    Creatinine 0.81 0.50 - 1.40 mg/dL    Calcium 7.6 (L) 8.5 - 10.5 mg/dL    Correct Calcium 8.6 8.5 - 10.5 mg/dL    AST(SGOT) 19 12 - 45 U/L    ALT(SGPT) 20 2 - 50 U/L    Alkaline Phosphatase 118 (H) 30 - 99 U/L    Total Bilirubin 0.9 0.1 - 1.5 mg/dL    Albumin 2.7 (L) 3.2 - 4.9 g/dL    Total Protein 6.4 6.0 - 8.2 g/dL    Globulin 3.7 (H) 1.9 - 3.5 g/dL    A-G Ratio 0.7 g/dL   CBC without Differential    Collection Time: 12/20/23  1:50 PM   Result Value Ref Range    WBC 12.9 (H) 4.8 - 10.8 K/uL    RBC 3.19 (L) 4.70 - 6.10 M/uL    Hemoglobin 8.6 (L) 14.0 - 18.0 g/dL    Hematocrit 27.9 (L) 42.0 - 52.0 %    MCV 87.5 81.4 - 97.8 fL    MCH 27.0 27.0 - 33.0 pg    MCHC 30.8 (L) 32.3 - 36.5 g/dL    RDW 51.4 (H) 35.9 - 50.0 fL    Platelet Count 148 (L) 164 - 446 K/uL    MPV 11.1 9.0 - 12.9 fL   ESTIMATED GFR    Collection Time: 12/20/23  1:50 PM   Result Value Ref Range    GFR (CKD-EPI) 109  >60 mL/min/1.73 m 2   Heparin Anti-Xa    Collection Time: 12/20/23  8:28 PM   Result Value Ref Range    Heparin Xa (UFH) <0.10 IU/mL   Prothrombin Time    Collection Time: 12/20/23  8:28 PM   Result Value Ref Range    PT 16.1 (H) 12.0 - 14.6 sec    INR 1.28 (H) 0.87 - 1.13   APTT    Collection Time: 12/20/23  8:28 PM   Result Value Ref Range    APTT 37.2 (H) 24.7 - 36.0 sec   VENOUS BLOOD GAS    Collection Time: 12/20/23 11:38 PM   Result Value Ref Range    Venous Bg Ph 7.34 7.31 - 7.45    Venous Bg Ph Temp Corrected 7.34 7.31 - 7.45    Venous Bg Pco2 38.8 (L) 41.0 - 51.0 mmHg    Venous Bg Pco2 Temp Corrected 38.5 (L) 41.0 - 51.0 mmHg    Venous Bg Po2 42.0 (H) 25.0 - 40.0 mmHg    Venous Bg Po2 Temp Corrected 41.4 (H) 25.0 - 40.0 mmHg    Venous Bg O2 Saturation 68.3 %    Venous Bg Hco3 20 (L) 24 - 28 mmol/L    Venous Bg Base Excess -5 mmol/L    Body Temp 36.8 Centigrade    O2 Therapy 2    CORTISOL    Collection Time: 12/21/23 12:10 AM   Result Value Ref Range    Cortisol 14.8 0.0 - 23.0 ug/dL   CBC WITHOUT DIFFERENTIAL    Collection Time: 12/21/23 12:10 AM   Result Value Ref Range    WBC 13.5 (H) 4.8 - 10.8 K/uL    RBC 2.92 (L) 4.70 - 6.10 M/uL    Hemoglobin 7.8 (L) 14.0 - 18.0 g/dL    Hematocrit 25.8 (L) 42.0 - 52.0 %    MCV 88.4 81.4 - 97.8 fL    MCH 26.7 (L) 27.0 - 33.0 pg    MCHC 30.2 (L) 32.3 - 36.5 g/dL    RDW 53.1 (H) 35.9 - 50.0 fL    Platelet Count 194 164 - 446 K/uL    MPV 10.8 9.0 - 12.9 fL   Comp Metabolic Panel    Collection Time: 12/21/23 12:10 AM   Result Value Ref Range    Sodium 129 (L) 135 - 145 mmol/L    Potassium 3.6 3.6 - 5.5 mmol/L    Chloride 96 96 - 112 mmol/L    Co2 22 20 - 33 mmol/L    Anion Gap 11.0 7.0 - 16.0    Glucose 91 65 - 99 mg/dL    Bun 22 8 - 22 mg/dL    Creatinine 0.91 0.50 - 1.40 mg/dL    Calcium 7.4 (L) 8.5 - 10.5 mg/dL    Correct Calcium 8.5 8.5 - 10.5 mg/dL    AST(SGOT) 18 12 - 45 U/L    ALT(SGPT) 17 2 - 50 U/L    Alkaline Phosphatase 111 (H) 30 - 99 U/L    Total Bilirubin 0.9  0.1 - 1.5 mg/dL    Albumin 2.6 (L) 3.2 - 4.9 g/dL    Total Protein 6.0 6.0 - 8.2 g/dL    Globulin 3.4 1.9 - 3.5 g/dL    A-G Ratio 0.8 g/dL   MAGNESIUM    Collection Time: 23 12:10 AM   Result Value Ref Range    Magnesium 2.0 1.5 - 2.5 mg/dL   PHOSPHORUS    Collection Time: 23 12:10 AM   Result Value Ref Range    Phosphorus 3.0 2.5 - 4.5 mg/dL   ESTIMATED GFR    Collection Time: 23 12:10 AM   Result Value Ref Range    GFR (CKD-EPI) 104 >60 mL/min/1.73 m 2   LACTIC ACID    Collection Time: 23 12:10 AM   Result Value Ref Range    Lactic Acid 1.4 0.5 - 2.0 mmol/L   Heparin Anti-Xa    Collection Time: 23  5:01 AM   Result Value Ref Range    Heparin Xa (UFH) 0.33 IU/mL   EKG    Collection Time: 23  8:27 AM   Result Value Ref Range    Report       [facility]    Test Date:  2023  Pt Name:    LESLIE BADILLO             Department: 171  MRN:        [id number]                      Room:       T730  Gender:     Male                         Technician: HARSHA  :        [id number]                   Requested By:MELISSA DUMONT  Order #:    [id number]                    Reading MD: Rick Douglas MD    Measurements  Intervals                                Axis  Rate:       82                           P:          31  KS:         189                          QRS:        -20  QRSD:       129                          T:          56  QT:         435  QTc:        508    Interpretive Statements  Sinus rhythm  Nonspecific intraventricular conduction delay  Compared to ECG 2023 15:48:17  Atrial fibrillation no longer present  ST (T wave) deviation no longer present  Electronically Signed On 2023 12:30:59 PST by Rick Douglas MD     HIV AG/AB COMBO ASSAY SCREENING    Collection Time: 23  1:37 AM   Result Value Ref Range    HIV Ag/Ab Combo Assay Non-Reactive Non Reactive   CBC WITHOUT DIFFERENTIAL    Collection Time: 23  1:37 AM   Result Value Ref Range    WBC 12.2  (H) 4.8 - 10.8 K/uL    RBC 3.01 (L) 4.70 - 6.10 M/uL    Hemoglobin 8.0 (L) 14.0 - 18.0 g/dL    Hematocrit 26.5 (L) 42.0 - 52.0 %    MCV 88.0 81.4 - 97.8 fL    MCH 26.6 (L) 27.0 - 33.0 pg    MCHC 30.2 (L) 32.3 - 36.5 g/dL    RDW 53.6 (H) 35.9 - 50.0 fL    Platelet Count 210 164 - 446 K/uL    MPV 10.7 9.0 - 12.9 fL   Comp Metabolic Panel    Collection Time: 12/22/23  1:37 AM   Result Value Ref Range    Sodium 129 (L) 135 - 145 mmol/L    Potassium 3.8 3.6 - 5.5 mmol/L    Chloride 99 96 - 112 mmol/L    Co2 19 (L) 20 - 33 mmol/L    Anion Gap 11.0 7.0 - 16.0    Glucose 81 65 - 99 mg/dL    Bun 19 8 - 22 mg/dL    Creatinine 0.72 0.50 - 1.40 mg/dL    Calcium 8.0 (L) 8.5 - 10.5 mg/dL    Correct Calcium 9.2 8.5 - 10.5 mg/dL    AST(SGOT) 18 12 - 45 U/L    ALT(SGPT) 15 2 - 50 U/L    Alkaline Phosphatase 121 (H) 30 - 99 U/L    Total Bilirubin 1.2 0.1 - 1.5 mg/dL    Albumin 2.5 (L) 3.2 - 4.9 g/dL    Total Protein 6.4 6.0 - 8.2 g/dL    Globulin 3.9 (H) 1.9 - 3.5 g/dL    A-G Ratio 0.6 g/dL   ESTIMATED GFR    Collection Time: 12/22/23  1:37 AM   Result Value Ref Range    GFR (CKD-EPI) 113 >60 mL/min/1.73 m 2   CBC with Differential    Collection Time: 12/23/23  5:25 AM   Result Value Ref Range    WBC 10.1 4.8 - 10.8 K/uL    RBC 3.10 (L) 4.70 - 6.10 M/uL    Hemoglobin 8.2 (L) 14.0 - 18.0 g/dL    Hematocrit 27.9 (L) 42.0 - 52.0 %    MCV 90.0 81.4 - 97.8 fL    MCH 26.5 (L) 27.0 - 33.0 pg    MCHC 29.4 (L) 32.3 - 36.5 g/dL    RDW 54.5 (H) 35.9 - 50.0 fL    Platelet Count 238 164 - 446 K/uL    MPV 10.8 9.0 - 12.9 fL    Neutrophils-Polys 82.70 (H) 44.00 - 72.00 %    Lymphocytes 11.30 (L) 22.00 - 41.00 %    Monocytes 3.50 0.00 - 13.40 %    Eosinophils 1.00 0.00 - 6.90 %    Basophils 0.30 0.00 - 1.80 %    Immature Granulocytes 1.20 (H) 0.00 - 0.90 %    Nucleated RBC 0.60 (H) 0.00 - 0.20 /100 WBC    Neutrophils (Absolute) 8.32 (H) 1.82 - 7.42 K/uL    Lymphs (Absolute) 1.14 1.00 - 4.80 K/uL    Monos (Absolute) 0.35 0.00 - 0.85 K/uL    Eos  (Absolute) 0.10 0.00 - 0.51 K/uL    Baso (Absolute) 0.03 0.00 - 0.12 K/uL    Immature Granulocytes (abs) 0.12 (H) 0.00 - 0.11 K/uL    NRBC (Absolute) 0.06 K/uL    Anisocytosis 1+     Macrocytosis 1+    Comp Metabolic Panel (CMP)    Collection Time: 12/23/23  5:25 AM   Result Value Ref Range    Sodium 129 (L) 135 - 145 mmol/L    Potassium 4.2 3.6 - 5.5 mmol/L    Chloride 95 (L) 96 - 112 mmol/L    Co2 21 20 - 33 mmol/L    Anion Gap 13.0 7.0 - 16.0    Glucose 89 65 - 99 mg/dL    Bun 20 8 - 22 mg/dL    Creatinine 0.75 0.50 - 1.40 mg/dL    Calcium 7.9 (L) 8.5 - 10.5 mg/dL    Correct Calcium 8.8 8.5 - 10.5 mg/dL    AST(SGOT) 12 12 - 45 U/L    ALT(SGPT) 12 2 - 50 U/L    Alkaline Phosphatase 125 (H) 30 - 99 U/L    Total Bilirubin 1.2 0.1 - 1.5 mg/dL    Albumin 2.9 (L) 3.2 - 4.9 g/dL    Total Protein 6.6 6.0 - 8.2 g/dL    Globulin 3.7 (H) 1.9 - 3.5 g/dL    A-G Ratio 0.8 g/dL   HEMOGLOBIN A1C    Collection Time: 12/23/23  5:25 AM   Result Value Ref Range    Glycohemoglobin 4.9 4.0 - 5.6 %    Est Avg Glucose 94 mg/dL   Lipid Profile (Lipid Panel)    Collection Time: 12/23/23  5:25 AM   Result Value Ref Range    Cholesterol,Tot 69 (L) 100 - 199 mg/dL    Triglycerides 66 0 - 149 mg/dL    HDL 32 (A) >=40 mg/dL    LDL 24 <100 mg/dL   Magnesium    Collection Time: 12/23/23  5:25 AM   Result Value Ref Range    Magnesium 2.3 1.5 - 2.5 mg/dL   Phosphorus    Collection Time: 12/23/23  5:25 AM   Result Value Ref Range    Phosphorus 2.5 2.5 - 4.5 mg/dL   Prothrombin time    Collection Time: 12/23/23  5:25 AM   Result Value Ref Range    PT 20.3 (H) 12.0 - 14.6 sec    INR 1.71 (H) 0.87 - 1.13   TSH with Reflex to FT4    Collection Time: 12/23/23  5:25 AM   Result Value Ref Range    TSH 8.390 (H) 0.380 - 5.330 uIU/mL   Vitamin D, 25-hydroxy (blood)    Collection Time: 12/23/23  5:25 AM   Result Value Ref Range    25-Hydroxy   Vitamin D 25 17 (L) 30 - 100 ng/mL   ESTIMATED GFR    Collection Time: 12/23/23  5:25 AM   Result Value Ref Range     GFR (CKD-EPI) 112 >60 mL/min/1.73 m 2   FREE THYROXINE    Collection Time: 12/23/23  5:25 AM   Result Value Ref Range    Free T-4 1.33 0.93 - 1.70 ng/dL   PLATELET ESTIMATE    Collection Time: 12/23/23  5:25 AM   Result Value Ref Range    Plt Estimation PLTCONFIRM    MORPHOLOGY    Collection Time: 12/23/23  5:25 AM   Result Value Ref Range    RBC Morphology Present     Polychromia 1+     Poikilocytosis 1+     Ovalocytes 1+    PERIPHERAL SMEAR REVIEW    Collection Time: 12/23/23  5:25 AM   Result Value Ref Range    Peripheral Smear Review see below    DIFFERENTIAL COMMENT    Collection Time: 12/23/23  5:25 AM   Result Value Ref Range    Comments-Diff see below        Medications:  Scheduled Medications   Medication Dose Frequency    Pharmacy Consult Request  1 Each PHARMACY TO DOSE    acetaminophen  1,000 mg TID    therapeutic multivitamin-minerals  1 Tablet DAILY WITH LUNCH    apixaban  5 mg BID    cefepime  2 g Q8HRS    dapagliflozin propanediol  10 mg DAILY    DULoxetine  60 mg DAILY    lactulose  30 mL TID    miconazole   BID    morphine ER  15 mg Q12HRS    omeprazole  20 mg DAILY    senna-docusate  2 Tablet BID    spironolactone  25 mg Q DAY    normal saline (PF)  20 mL Q12HRS     PRN medications: Respiratory Therapy Consult, acetaminophen **FOLLOWED BY** [START ON 12/27/2023] acetaminophen, hydrALAZINE, acetaminophen, carboxymethylcellulose, benzocaine-menthol, mag hydrox-al hydrox-simeth, ondansetron **OR** ondansetron, traZODone, sodium chloride, dronabinol, senna-docusate **AND** polyethylene glycol/lytes **AND** magnesium hydroxide **AND** bisacodyl, albuterol, morphine    Diet:  Current Diet Order   Procedures    Diet Order Diet: Cardiac       Assessment:  Active Hospital Problems    Diagnosis     *Unilateral complete BKA, left, initial encounter ([facility])     Hypotension     BKA stump complication (HCC)     Bacteremia due to Staphylococcus aureus     Anemia     Chronic diastolic heart failure with preserved  ejection fraction (HCC)     CHF (congestive heart failure), NYHA class I, acute on chronic, combined (HCC)     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)     Acute kidney injury (HCC)     Hyponatremia     Chronic use of opiate drug for therapeutic purpose     Atrial fibrillation with RVR (HCC)     Aortic root dilatation (HCC)     Chronic pain of right knee        Medical Decision Making and Plan:    Left residual limb infection and osteomyelitis requiring BKA revision:  -PT and OT for mobility and ADLs. Per guidelines, 15 hours per week between PT, OT.  -Follow-up orthopedic surgery  -Nonweightbearing left lower extremity  -Dressing changes daily  -Continue comprehensive acute inpatient rehabilitation, did require multiple medical hold today secondary to respiratory status     Multiple right lower extremity surgical interventions: open reduction of the right subtalar joint dislocation, right talonavicular joint dislocation right foot triple arthrodesis, right extensor tendon lengthening and application of multiplanar external fixator on the right lower extremity on 12/6.   -Right lower extremity external fixator, pain management as per nursing protocol  - Expected removal of Ex-Fix in 8 to 12 weeks  -Toe-touch weightbearing on right lower extremity for transfers only     CV: CHF, severe aortic stenosis, moderate aortic insufficiency, moderate to severe tricuspid regurg and RSVP of 66  -Cardiology was consulted and recommending TAVR in the next month.  -Will hold beta-blocker/ACE/ARB given hypotension  -Follow-up with cardiology as outpatient  -Transition to regular diet secondary to poor intake of cardiovascular diet     Rheumatoid arthritis: on Humira at home  -Will discuss with patient about bringing home medication and regular administration.     stump abscess/osteomyelitis complicated with MSSA bacteremia: Previous abscess and osteomyelitis 10/2023, revision as above 12/16  - Cefepime 2 g IV every  8 hours  -Transition to ertapenem on discharge until 1/23  -CBC with differential and CMP weekly  - Follow-up with ID as outpatient     Chronic pain: Severe pain overnight and this morning limiting her participation with therapy  -MS Contin 15 mg every 12 hours  - Transition to home dose morphine IR 30 mg 4 hours scheduled  -Oxycodone 10 mg every 4 hours as needed severe pain  - Cymbalta 60 mg daily     Postoperative pain:  - Tylenol 1000 mg 3 times daily, given high-dose Tylenol will check LFTs in a.m.  - Morphine IR 30 mg every 4 hours scheduled  - Oxycodone 10 mg every 4 hours as needed severe pain, discussed risks of respiratory suppression     Leukocytosis: WBC of 12.2 on 12/22 -> 10.1 on 12/23  -Check CBC in a.m.     Hyponatremia: Sodium of 129 on 12/23  -Start patient on sodium chloride tablets 1 g 3 times daily  - Check BMP in a.m.     Respiratory failure with hypoxia  -Consult respiratory therapy  - Supplemental oxygen as per guidelines, currently on supplemental oxygen of 4 L via nasal cannula/OxyMask  - Check chest x-ray, check CBC, check BMP in a.m.  - Consult hospitalist discussed with Dr. 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 intake, will transition to regular diet     GI Ppx - Patient on Prilosec for GERD prophylaxis. Patient on Senna-docusate for constipation prophylaxis.      DVT Ppx - Patient apixaban 5 mg every 12 hours on transfer.  ____________________________________    Alexx Khan DO  [facility] - Physical Medicine & Rehabilitation   [facility] - Spinal Cord Injury Medicine  ____________________________________    Total time:  55 minutes.  I spent greater than 50% of the time for patient care and coordination on this date, including unit/floor time, and face-to-face time  with the patient as per assessment and plan above.  Discussion included acute respiratory failure increasing work of breathing limiting participation in therapy, chronic pain, modification to multiple medications, described significant risk of respiratory depression, cardiovascular, changed diet to regular in setting of poor intake

## 2023-12-24 NOTE — FLOWSHEET NOTE
12/24/23 0728   Events/Summary/Plan   Events/Summary/Plan 02 pulse ox check. Found him with the oxymask off   Vital Signs   Pulse 92   Respiration (!) 22   Pulse Oximetry (!) 73 %   $ Pulse Oximetry (Spot Check) Yes   Respiratory Assessment   Level of Consciousness Alert   Oxygen   O2 Delivery Device Room air w/o2 available  (oxymask placed back on. 02 increased to 5L)

## 2023-12-24 NOTE — CARE PLAN
"The patient is Watcher - Medium risk of patient condition declining or worsening    Shift Goals  Clinical Goals: pain management, skin integrity    Problem: Fall Risk - Rehab  Goal: Patient will remain free from falls  Note: Osiris Healy Fall risk Assessment Score: 19    High fall risk Interventions   - Bed and strip alarm   - Yellow sign by the door   - Yellow wrist band \"Fall risk\"  - Room near to the nurse station  - Do not leave patient unattended in the bathroom  - Fall risk education provided     Problem: Bowel Elimination  Goal: Patient will participate in bowel management program  Note: LBM 12/19, prn milk of mag and senna given, no abdominal discomfort     "

## 2023-12-24 NOTE — FLOWSHEET NOTE
12/23/23 1651   Events/Summary/Plan   Events/Summary/Plan 02 pulse ox check   Vital Signs   Pulse 86   Respiration 18   Pulse Oximetry 91 %   $ Pulse Oximetry (Spot Check) Yes   Oxygen   O2 (LPM) 3  (states he is sob, increased 02 back to 4L)   O2 Delivery Device Oxymask

## 2023-12-24 NOTE — THERAPY
"Physical Therapy   Initial Evaluation     Patient Name: Richard Hubbard II  Age:  48 y.o., Sex:  male  Medical Record #: [id number]  Today's Date: 12/23/2023     Subjective    \"I just feel so lethargic; no energy\".  In regards to sitting up/ transferring to  \"I could, but I'd probably pass out\".     Objective       12/23/23 0931   Therapy Missed   Missed Therapy (Minutes) 30   Reason For Missed Therapy Medical - Patient on Hold from Therapy;Medical - Patient  in Procedure  (Requiring breathing tx and trial of oxymask d/t SOB)   PT Charge Group   PT Evaluation PT Evaluation Mod   PT Total Time Spent   PT Individual Total Time Spent (Mins) 30   Prior Living Situation   Prior Services Home-Independent   Housing / Facility 1 Story House   Steps Into Home   (ramp)   Steps In Home 0   Equipment Owned Wheelchair  (standard cushion)   Lives with - Patient's Self Care Capacity Child Less than 18 Years of Age;Spouse   Comments 6 and 8 years old   Prior Level of Functional Mobility   Bed Mobility Independent   Transfer Status Independent   Ambulation Other (Comments)  (wc 100% per pt report)   Assistive Devices Used Wheelchair   Stairs Other (Comments)  (wc up ramp indep propulsion)   Prior Functioning: Everyday Activities   Self Care Independent   Indoor Mobility (Ambulation) Dependent  (pt reported wc mobility 100%)   Stairs Dependent   Functional Cognition Independent   Prior Device Use Manual wheelchair   Vitals   Pulse 94   Patient BP Position Supine   Blood Pressure 114/68   Pulse Oximetry 98 %   O2 (LPM) 4   O2 Delivery Device Nasal Cannula   Vitals Comments SOB observed, RT, RN, physician notified. RT performed breathing treatments, and provided oxymask due to tendency for mouth breathing.   Bed Mobility    Supine to Sit Refused   Rolling Moderate Assist to Rt.;Moderate Assist to Lt.   Roll Left and Right   Assistance Needed Physical assistance   Physical Assistance Level 26%-50%   CARE Score - Roll Left and Right " 3   Roll Left and Right Discharge Goal   Discharge Goal 6   Sit to Lying   Reason if not Attempted Refused to perform  (SOB observed)   CARE Score - Sit to Lying 7   Sit to Lying Discharge Goal   Discharge Goal 6   Lying to Sitting on Side of Bed   Reason if not Attempted Medical concerns   CARE Score - Lying to Sitting on Side of Bed 88   Lying to Sitting on Side of Bed Discharge Goal   Discharge Goal 6   Sit to Stand   Reason if not Attempted Activity not applicable  (NWB precautions bilaterally)   CARE Score - Sit to Stand 9   Sit to Stand Discharge Goal   Discharge Goal 9   Chair/Bed-to-Chair Transfer   Reason if not Attempted Medical concerns   CARE Score - Chair/Bed-to-Chair Transfer 88   Chair/Bed-to-Chair Transfer Discharge Goal   Discharge Goal 5   Car Transfer   Reason if not Attempted Medical concerns   CARE Score - Car Transfer 88   Car Transfer Discharge Goal   Discharge Goal 3   Walk 10 Feet   Reason if not Attempted Activity not applicable   CARE Score - Walk 10 Feet 9   Walk 10 Feet Discharge Goal   Discharge Goal 9   Walk 50 Feet with Two Turns   Reason if not Attempted Activity not applicable   CARE Score - Walk 50 Feet with Two Turns 9   Walk 50 Feet with Two Turns Discharge Goal   Discharge Goal 9   Walk 150 Feet   Reason if not Attempted Activity not applicable   CARE Score - Walk 150 Feet 9   Walk 150 Feet Discharge Goal   Discharge Goal 9   Walking 10 Feet on Uneven Surfaces   Reason if not Attempted Activity not applicable   CARE Score - Walking 10 Feet on Uneven Surfaces 9   Walking 10 Feet on Uneven Surfaces Discharge Goal   Discharge Goal 9   1 Step (Curb)   Reason if not Attempted Activity not applicable   CARE Score - 1 Step (Curb) 9   1 Step (Curb) Discharge Goal   Discharge Goal 9   4 Steps   Reason if not Attempted Activity not applicable   CARE Score - 4 Steps 9   4 Steps Discharge Goal   Discharge Goal 9   12 Steps   Reason if not Attempted Activity not applicable   CARE Score - 12  "Steps 9   12 Steps Discharge Goal   Discharge Goal 9   Picking Up Object   Reason if not Attempted Activity not applicable   CARE Score - Picking Up Object 9   Picking Up Object Discharge Goal   Discharge Goal 9   Wheel 50 Feet with Two Turns   Reason if not Attempted Medical concerns   CARE Score - Wheel 50 Feet with Two Turns 88   Type of Wheelchair/Scooter Manual   Wheel 50 Feet with Two Turns Discharge Goal   Discharge Goal 6   Wheel 150 Feet   Reason if not Attempted Medical concerns   CARE Score - Wheel 150 Feet 88   Type of Wheelchair/Scooter Manual   Wheel 150 Feet Discharge Goal   Discharge Goal 5   Gait Functional Level of Assist    Gait Level Of Assist Unable to Participate  (NWB BLE)   Wheelchair Functional Level of Assist   Wheelchair Assist   (Unable, SOB requiring breathing tx, medical hold)   Stairs Functional Level of Assist   Level of Assist with Stairs Unable to Participate   Transfer Functional Level of Assist   Bed, Chair, Wheelchair Transfer Unable to Participate   Problem List    Problems Pain;Impaired Bed Mobility;Impaired Transfers;Functional ROM Deficit;Impaired Balance;Decreased Activity Tolerance   Precautions   Precautions Fall Risk;Non Weight Bearing Right Lower Extremity;Non Weight Bearing Left Lower Extremity   Comments External fixator to RLE \"NWB to TTWB for RLE transfers\" per order   Current Discharge Plan   Current Discharge Plan Return to Prior Living Situation   Interdisciplinary Plan of Care Collaboration   IDT Collaboration with  Physician;Nursing;Respiratory Therapist   Patient Position at End of Therapy In Bed;Other (Comments)  (handoff to RT for breathing treatments and trial of oxymask d/t tendency for mouth breathing)   Collaboration Comments Medical hold 2nd half of session for breathing treatments. Pt signed up for tech assist for future appointment.   PT DME Recommendations   Wheelchair   (has wc)   Cushion Specialty (See other comments)  (has standard cushion, but " "skin is currently compromised, recommend specialty cushion for pressure relief)   Additional Equipment Transfer Board (30\" Length)   Benefit   Therapy Benefit Patient Would Benefit from Inpatient Rehabilitation Physical Therapy to Maximize Functional [place] with ADLs, IADLs and Mobility.   Strengths & Barriers   Strengths Supportive family   Barriers Decreased endurance;Fatigue;Generalized weakness;Impaired activity tolerance;Impaired balance;Limited mobility;Pain;Pain poorly managed  (WB precautions)     Pt provided with 18\"w wc, and pressure relieving Roho cushion. Elevating leg rests were provided to support BKA and external fixator.   Slide board was obtained.     Assessment  Patient is 48 y.o. male with a history of aortic stenosis, bicuspid aortic valve, mild pretension with RSVP of 50, history of gastric bypass, history of arthritis on Humira AAA stump abscess/osteomyelitis complicated with MSSA bacteremia on 10/2023 who presented on 12/6 with progressively flatfoot on the right side.  He underwent open reduction of the right subtalar joint dislocation, right talonavicular joint dislocation right foot triple arthrodesis, right extensor tendon lengthening and application of multiplanar external fixator on the right lower extremity on 12/6.  Per orthopedic surgery patient is nonweightbearing to toe-touch weightbearing for transfers on the right lower extremity.  Ex-Fix is to be removed after 8 to 12 weeks.     Additional factors influencing patient status / progress (ie: cognitive factors, co-morbidities, social support, etc):  On 12/8 his hospitalization was complicated by atrial fibrillation with RVR.  Echo at that time showed ejection fraction of 45% with severe aortic stenosis and moderate aortic insufficiency as well as moderate to severe tricuspid regurg and RSVP of 66.  Cardiology was consulted and is recommending a TAVR in the next month.  He underwent a cardiac catheterization on 12/20.  He was " not on any beta-blockers ACE inhibitor's or ARB's considering his hypotension.     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  Orthopedic surgery is requesting that he remain nonweightbearing on the left lower extremity.  Intraoperative cultures grew Serratia marcescens he was initially started on ceftriaxone and Zyvox until 12/13 then changed to cefepime on 12/13.  Infectious disease was consulted and recommended a total of 6 weeks of IV antibiotics with transition to Invanz upon discharge from [facility] ending on 1/23/2024.      Plan  Recommend Physical Therapy  minutes per day 5-7 days per week for 3 weeks for the following treatments:  PT Group Therapy, PT E Stim Attended, PT Prosthetic Training, PT Gait Training, PT Self Care/Home Eval, PT Therapeutic Exercises, PT Neuro Re-Ed/Balance, PT Aquatic Therapy, PT Therapeutic Activity, PT Manual Therapy, and PT Evaluation.    Passport items to be completed:  Get in/out of bed safely, in/out of a vehicle, safely use mobility device, walk or wheel around home/community, navigate up and down stairs, show how to get up/down from the ground, ensure home is accessible, demonstrate HEP, complete caregiver training    Goals:  Long term and short term goals have been discussed with patient and they are in agreement.    Physical Therapy Problems (Active)       Problem: Mobility       Dates: Start:  12/23/23         Goal: STG-Within one week, patient will propel wheelchair household distances >50 ft indoors x 2 SBA       Dates: Start:  12/23/23               Problem: Mobility Transfers       Dates: Start:  12/23/23         Goal: STG-Within one week, patient will transfer bed to chair with slide board mod A x 1       Dates: Start:  12/23/23            Goal: STG-Within one week, patient will move supine to sit mod A       Dates: Start:  12/23/23               Problem: PT-Long Term Goals       Dates: Start:   12/23/23         Goal: LTG-By discharge, patient will propel wheelchair mod I >150 ft over various surfaces, including ramp to enter/ exit home        Dates: Start:  12/23/23            Goal: LTG-By discharge, patient will transfer one surface to another mod I slide board wc <> bed, while maintaining WB precautions       Dates: Start:  12/23/23               no radiation

## 2023-12-24 NOTE — THERAPY
"Physical Therapy   Daily Treatment     Patient Name: Richard Hubbard II  Age:  48 y.o., Sex:  male  Medical Record #: [id number]  Today's Date: 12/23/2023     Precautions  Precautions: (P) Fall Risk, Non Weight Bearing Right Lower Extremity, Non Weight Bearing Left Lower Extremity  Comments: (P) External fixator to RLE \"NWB to TTWB for RLE transfers\" per order    Subjective    Pt reported that he could not trial sitting EOB this afternoon; too fatigued/ SOB.     Objective       12/23/23 1401   PT Charge Group   PT Therapeutic Activities (Units) 2   PT Total Time Spent   PT Individual Total Time Spent (Mins) 30   Precautions   Precautions Fall Risk;Non Weight Bearing Right Lower Extremity;Non Weight Bearing Left Lower Extremity   Comments External fixator to RLE \"NWB to TTWB for RLE transfers\" per order   Vitals   O2 (LPM) 4   O2 Delivery Device Oxymask   Pain 0 - 10 Group   Location Leg   Location Orientation Right   Transfer Functional Level of Assist   Bed, Chair, Wheelchair Transfer Unable to Participate   Supine Lower Body Exercise   Quadriceps Isometrics 2 sets of 10;Bilateral   Bed Mobility    Supine to Sit Unable to Participate  (SOB)   Scooting Total Assist X 2  (2 person with bedding assit, 1 person to support external fixator)   Rolling Moderate Assist to Rt.;Moderate Assist to Lt.   Neuro-Muscular Treatments   Neuro-Muscular Treatments Weight Shift Left;Weight Shift Right;Verbal Cuing;Tactile Cuing;Sequencing   Comments Rolling in bed to reposition bedding, and for RN to treat irritated buttock/ groin skin. Repositioning patient in bed for safety and comfort 2-3 person scooting. Pillows to support neutral rotation of external fixator and posture.   Interdisciplinary Plan of Care Collaboration   IDT Collaboration with  Certified Nursing Assistant;Therapy Tech;Nursing;Physician;Respiratory Therapist   Patient Position at End of Therapy In Bed;Other (Comments)  (handoff to RN for continued skin " "assessment)   Collaboration Comments POC, concerning skin on bottom, repositioning assistance         Assessment    Pt was unable to trial EOB balance, sup <> sit, or transfer d/t ongoing SOB. Pt is now using 4L with oxymask.  Pt was assisted with bed mobility, to reposition and better align R hip for neutral rotation/ support external fixator, with 2-3 person assist. Pt participated in rolling 4x and quad sets.       Strengths: Supportive family  Barriers: Decreased endurance, Fatigue, Generalized weakness, Impaired activity tolerance, Impaired balance, Limited mobility, Pain, Pain poorly managed (WB precautions)    Plan    Trial EOB balance, assess slide board transfer (caution: skin is compromised), bed mobility, therapeutic exercise.    DME  PT DME Recommendations  Wheelchair:  (has wc)  Cushion: Specialty (See other comments) (has standard cushion, but skin is currently compromised, recommend specialty cushion for pressure relief)  Additional Equipment: Transfer Board (30\" Length)    Passport items to be completed:  Get in/out of bed safely, in/out of a vehicle, safely use mobility device, walk or wheel around home/community, navigate up and down stairs, show how to get up/down from the ground, ensure home is accessible, demonstrate HEP, complete caregiver training    Physical Therapy Problems (Active)       Problem: Mobility       Dates: Start:  12/23/23         Goal: STG-Within one week, patient will propel wheelchair household distances >50 ft indoors x 2 SBA       Dates: Start:  12/23/23               Problem: Mobility Transfers       Dates: Start:  12/23/23         Goal: STG-Within one week, patient will transfer bed to chair with slide board mod A x 1       Dates: Start:  12/23/23            Goal: STG-Within one week, patient will move supine to sit mod A       Dates: Start:  12/23/23               Problem: PT-Long Term Goals       Dates: Start:  12/23/23         Goal: LTG-By discharge, patient will " propel wheelchair mod I >150 ft over various surfaces, including ramp to enter/ exit home        Dates: Start:  12/23/23            Goal: LTG-By discharge, patient will transfer one surface to another mod I slide board wc <> bed, while maintaining WB precautions       Dates: Start:  12/23/23

## 2023-12-24 NOTE — FLOWSHEET NOTE
12/24/23 0848   Events/Summary/Plan   Events/Summary/Plan 02 pulse ox check   Vital Signs   Pulse 92   Respiration (!) 22   Pulse Oximetry 96 %   $ Pulse Oximetry (Spot Check) Yes   Respiratory Assessment   Level of Consciousness Alert   Chest Exam   Work Of Breathing / Effort Moderate   Oxygen   O2 (LPM) 5   O2 Delivery Device Oxymask

## 2023-12-24 NOTE — CARE PLAN
"The patient is Watcher - Medium risk of patient condition declining or worsening    Shift Goals  Clinical Goals: pain management, skin integrity    Progress made toward(s) clinical / shift goals:    Problem: Fall Risk - Rehab  Goal: Patient will remain free from falls  Note: Osiris Healy Fall risk Assessment Score: 19    High fall risk Interventions   - Alarming seatbelt  - Bed and strip alarm   - Yellow sign by the door   - Yellow wrist band \"Fall risk\"  - Room near to the nurse station  - Do not leave patient unattended in the bathroom  - Fall risk education provided       Problem: Pain - Standard  Goal: Alleviation of pain or a reduction in pain to the patient’s comfort goal  Note: Patient able to verbalize needs.  Denies pain or discomfort this shift and no s/s same noted.  Will continue to monitor.     Problem: Infection  Goal: Patient will remain free from infection  Outcome: Progressing  Note: Pt is able to verbalize s/s of infection: redness of area, fever, pain.          "

## 2023-12-24 NOTE — THERAPY
"Occupational Therapy  Daily Treatment     Patient Name: Richard Hubbard II  Age:  48 y.o., Sex:  male  Medical Record #: [id number]  Today's Date: 12/24/2023     Precautions  Precautions: (P) Fall Risk, Non Weight Bearing Right Lower Extremity, Non Weight Bearing Left Lower Extremity  Comments: (P) External fixator to RLE \"NWB to TTWB for RLE transfers\" per order         Subjective    Pt needed to be called by his name several times to awaken from sleep.     Objective       12/24/23 1401   OT Charge Group   OT Self Care / ADL (Units) 1   OT Therapeutic Exercise (Units) 3   OT Total Time Spent   OT Individual Total Time Spent (Mins) 60   Precautions   Precautions Fall Risk;Non Weight Bearing Right Lower Extremity;Non Weight Bearing Left Lower Extremity   Comments External fixator to RLE \"NWB to TTWB for RLE transfers\" per order   Pain   Pain Scales 0 to 10 Scale    Intervention Emotional Support   Pain 0 - 10 Group   Location Leg   Location Orientation Right;Left   Pain Rating Scale (NPRS) 6   Comfort Goal Comfort with Movement   Therapist Pain Assessment Post Activity Pain Same as Prior to Activity   Functional Level of Assist   Grooming Standby Assist   Grooming Description Increased time;Initial preparation for task;Set-up of equipment;Supervision for safety   Supine Upper Body Exercises   Chest Fly 3 sets of 10;Medium Resistance Theraband   Chest Press 3 sets of 10;Medium Resistance Theraband   Front Arm Raise 3 sets of 10;Medium Resistance Theraband   Bicep Curl 3 sets of 10;Medium Resistance Theraband   Interdisciplinary Plan of Care Collaboration   IDT Collaboration with  Nursing   Patient Position at End of Therapy In Bed;Bed Alarm On;Call Light within Reach;Tray Table within Reach;Phone within Reach     ADL task and therapeutic ex's performed from bed level due to fatigue. Therapeutic ex's performed to increase strength for improved ADL's and out of bed tolerance.    Assessment    Pt needed several rest " "breaks due to fatigue. Pt with same level of pain in LE's as yesterday.  Strengths: Able to follow instructions, Effective communication skills, Independent prior level of function, Pleasant and cooperative, Supportive family  Barriers: Decreased endurance, Fatigue, Generalized weakness, Impaired activity tolerance, Impaired balance, Limited mobility, Pain    Plan    Will continue with OT POC.    DME  OT DME Recommendations  Bathroom Equipment: Drop Arm Commode  Additional Equipment: Transfer Board (30\" Length)    Passport items to be completed:      Occupational Therapy Goals (Active)       Problem: Bathing       Dates: Start:  12/23/23         Goal: STG-Within one week, patient will bathe with Min A.       Dates: Start:  12/23/23               Problem: Dressing       Dates: Start:  12/23/23         Goal: STG-Within one week, patient will dress UB with SBA.       Dates: Start:  12/23/23            Goal: STG-Within one week, patient will dress LB with Max/Mod A.       Dates: Start:  12/23/23               Problem: Functional Transfers       Dates: Start:  12/23/23         Goal: STG-Within one week, patient will transfer to toilet with Max A.       Dates: Start:  12/23/23               Problem: OT Long Term Goals       Dates: Start:  12/23/23         Goal: LTG-By discharge, patient will complete basic self care tasks with CGA/Supervision.       Dates: Start:  12/23/23            Goal: LTG-By discharge, patient will perform bathroom transfers with CGA.       Dates: Start:  12/23/23              "

## 2023-12-24 NOTE — FLOWSHEET NOTE
12/24/23 1150   Events/Summary/Plan   Events/Summary/Plan svn and flutter   Vital Signs   Pulse 87  (post 85)   Respiration 18  (post 18)   Pulse Oximetry 92 %   $ Pulse Oximetry (Spot Check) Yes   Respiratory Assessment   Level of Consciousness Alert   Chest Exam   Work Of Breathing / Effort Within Normal Limits;Mild   Breath Sounds   RUL Breath Sounds Diminished  (increased aeration)   RML Breath Sounds Diminished   RLL Breath Sounds Diminished   DONALD Breath Sounds Diminished   LLL Breath Sounds Diminished   Secretions   Cough Non Productive;Weak   Oxygen   O2 (LPM) 5   O2 Delivery Device Silicone Nasal Cannula  (switched over to oxymask on pt's request)

## 2023-12-24 NOTE — FLOWSHEET NOTE
12/23/23 1847   Events/Summary/Plan   Events/Summary/Plan SVN   Vital Signs   Pulse 85  (post 86)   Respiration 18  (post 18)   Pulse Oximetry (!) 77 %   $ Pulse Oximetry (Spot Check) Yes   Respiratory Assessment   Level of Consciousness Alert   Chest Exam   Work Of Breathing / Effort Mild   Breath Sounds   RUL Breath Sounds Clear  (increased aeration T/O)   RML Breath Sounds Diminished   RLL Breath Sounds Diminished   DONALD Breath Sounds Clear   LLL Breath Sounds Diminished   Oxygen   O2 Delivery Device Room air w/o2 available  (had oxymask off.)

## 2023-12-25 NOTE — PROGRESS NOTES
" NURSING DAILY NOTE    Name: Richard Hubbard II  Date of Admission: 12/22/2023  Admitting Diagnosis: Unilateral complete BKA, left, initial encounter (Formerly [facility])  Attending Physician: Keaton Pisano M.d.  Allergies: Nsaids, Diphenhydramine, Mirtazapine, Penicillins, and Promethazine    Safety  Patient Assist  o   Patient Precautions  oFall Risk, Non Weight Bearing Right Lower Extremity, Non Weight Bearing Left Lower Extremity  Precaution Comments  oExternal fixator to RLE \"NWB to TTWB for RLE transfers\" per order  Bed Transfer Status  oUnable to Participate  Toilet Transfer Status  o (Not tested due to medical/safety concerns.)  Assistive Devices  o   Oxygen  oMask  Diet/Therapeutic Dining  o  Current Diet Order   Procedures    Diet Order Diet: Regular     Pill Administration  owhole  Agitated Behavioral Scale  o16  ABS Level of Severity  Keith Agitation    Fall Risk  Has the patient had a fall this admission?  Keith  Osiris Healy Fall Risk Scoring  o19, HIGH RISK  Fall Risk Safety Measures  catherine alarm and chair alarm    Vitals  Temperature: 36.4 °C (97.6 °F)  Temp src: Oral  Pulse: 87  Respiration: 20  Blood Pressure: 106/55  Blood Pressure MAP (Calculated): 72 MM HG  BP Location: Right, Upper Arm  Patient BP Position: Mast's Position    Oxygen  Pulse Oximetry: 96 %  O2 (LPM): 5  O2 Delivery Device: Mask  Incentive Spirometer Volume: 1000 mL    Bowel and Bladder  Last Bowel Movement  o12/19/23 (refused lactulose, agreed to senna scheduled)  Stool Type  o   Bowel Device  o   Continent  oBladder: Continent void  oBowel: Continent movement  Bladder Function  oUrine Void (mL): 300 ml  Number of Times Voided: 1  Urine Color: Melonie  Genitourinary Assessment  oBladder Assessment (WDL):  WDL Except  Rivera Catheter: Not Applicable  Urine Color: Melonie  Bladder Device: Urinal  Time Void: No  Bladder Scan: Post Void  $ Bladder Scan Results (mL): 35    Skin  Nam Score  o 13  Sensory Interventions  o Bed Types: " Standard/Trauma Mattress with Overlay  Skin Preventative Measures: Waffle Overlay, Pillows in Use for Support / Positioning  Moisture Interventions  oMoisturizers/Barriers: Barrier Wipes, Antifungal Paste      Pain  Pain Rating Scale  o6 - Hard to ignore, avoid usual activities  Pain Location  Susan  Pain Location Orientation  oRight, Left  Pain Interventions  oMedication (see MAR), Emotional Support, Environmental Changes, Food, Rest    ADLs    Bathing  o   Linen Change  o   Personal Hygiene  o   Chlorhexidine Bath  o   Oral Care  o   Teeth/Dentures  o   Shave  o   Nutrition Percentage Eaten  o   Environmental Precautions  oPersonal Belongings, Wastebasket, Call Bell etc. in Easy Reach, Bed in Low Position  Patient Turns/Positioning  oPatient Turns Self from Side to Side  Patient Turns Assistance/Tolerance  o   Bed Positions  o   Head of Bed Elevated  o       Psychosocial/Neurologic Assessment  Psychosocial Assessment  oPsychosocial (WDL):  WDL Except  Patient Behaviors: Fatigue  Neurologic Assessment  oNeuro (WDL): Exceptions to WDL  Level of Consciousness: Alert  Orientation Level: Oriented X4  Cognition: Follows commands  Speech: Delayed responses  Pupil Assesment: Yes  R Pupil Size (mm): 2  R Pupil Shape / Description: Round  R Pupil Reaction: Brisk  L Pupil Size (mm): 2  L Pupil Shape / Description: Round  L Pupil Reaction: Brisk  Motor Function/Sensation Assessment: Motor strength  Muscle Strength Right Arm: Good Strength Against Gravity and Moderate Resistance  Muscle Strength Left Arm: Good Strength Against Gravity and Moderate Resistance  Muscle Strength Right Leg: Weak Movement but Not Against Gravity or Resistance  Muscle Strength Left Leg: Fair Strength against Gravity but No Resistance (L BKA)  oEENT (WDL):  WDL Except    Cardio/Pulmonary Assessment  Edema  oScrotal Edema: Generalized  RLE Edema: Generalized  Respiratory Breath Sounds  oRUL Breath Sounds: Diminished  RML Breath Sounds: Diminished  RLL  Breath Sounds: Diminished  DONALD Breath Sounds: Diminished  LLL Breath Sounds: Diminished  Cardiac Assessment  oCardiac (WDL):  WDL Except

## 2023-12-25 NOTE — CARE PLAN
The patient is Watcher - Medium risk of patient condition declining or worsening    Shift Goals  Clinical Goals: pain management, skin integrity    Problem: Pain - Standard  Goal: Alleviation of pain or a reduction in pain to the patient’s comfort goal  Note: Patient receiving scheduled morphine for bilateral leg pain     Problem: Skin Integrity  Goal: Patient's skin integrity will be maintained or improve  Note: Pin care done with charge RN, micotin cream applied to sacrum and groin

## 2023-12-25 NOTE — FLOWSHEET NOTE
12/25/23 0637   Events/Summary/Plan   Events/Summary/Plan SVN and flutter   Vital Signs   Pulse 86   Respiration 18   Pulse Oximetry 95 %   $ Pulse Oximetry (Spot Check) Yes   Respiratory Assessment   Level of Consciousness Alert   Chest Exam   Work Of Breathing / Effort Within Normal Limits;Mild   Breath Sounds   RUL Breath Sounds Diminished  (increased aeration T/O)   RML Breath Sounds Diminished   RLL Breath Sounds Diminished   DONALD Breath Sounds Diminished   LLL Breath Sounds Diminished   Oxygen   O2 (LPM) 5  (02 titrated to 4.5)   O2 Delivery Device Oxymask

## 2023-12-25 NOTE — FLOWSHEET NOTE
12/25/23 1458   Events/Summary/Plan   Events/Summary/Plan SVN and Flutter   Vital Signs   Pulse 84  (post 86)   Respiration 20  (post 20)   Pulse Oximetry 96 %   $ Pulse Oximetry (Spot Check) Yes   Respiratory Assessment   Level of Consciousness Alert   Chest Exam   Work Of Breathing / Effort Within Normal Limits;Mild   Breath Sounds   RUL Breath Sounds Diminished  (increased aeration T/O)   RML Breath Sounds Diminished   RLL Breath Sounds Diminished   DONALD Breath Sounds Diminished   LLL Breath Sounds Diminished   Oxygen   O2 (LPM) 4.5  (titrated to 4L)   O2 Delivery Device Oxymask

## 2023-12-25 NOTE — CARE PLAN
The patient is Watcher - Medium risk of patient condition declining or worsening    Shift Goals  Clinical Goals: Safety  Patient Goals: Pain control    Progress made toward(s) clinical / shift goals:    Problem: Fall Risk - Rehab  Goal: Patient will remain free from falls  Outcome: Progressing     Problem: Pain - Standard  Goal: Alleviation of pain or a reduction in pain to the patient’s comfort goal  Outcome: Progressing       Patient is not progressing towards the following goals:

## 2023-12-25 NOTE — THERAPY
Physical Therapy   Daily Treatment     Patient Name: Richard Hubbard II  Age:  48 y.o., Sex:  male  Medical Record #: [id number]  Today's Date: 12/25/2023     Precautions  Precautions: Fall Risk, Non Weight Bearing Right Lower Extremity, Non Weight Bearing Left Lower Extremity  Comments: EX Fix to RLE    Subjective    Patient agreeable to PT; RN present; pt has variable alertness during session; reports having just napped but limited food & fluid intake; requested ice at end of session.     Objective       12/25/23 1401   Therapy Missed   Missed Therapy (Minutes) 30   Reason For Missed Therapy Medical - Patient not Able To Participate;Medical - Other (Please Comment)  (difficulty with attention to task, lethargy)   PT Charge Group   PT Therapeutic Activities (Units) 2   PT Total Time Spent   PT Individual Total Time Spent (Mins) 30   Vitals   O2 (LPM) 5   O2 Delivery Device Oxymask;Nasal Cannula   Vitals Comments RT present during session, moving from NC to Oxymask   Pain   Intervention Education;Repositioned;Distraction;Therapeutic Presence;Rest   Pain 0 - 10 Group   Location Leg   Location Orientation Right;Lower   Description Aching   Comfort Goal 0   Therapist Pain Assessment 7;8;Prior to Activity;Nurse Notified  (RN present, placing IV ABX)   Gait Functional Level of Assist    Gait Level Of Assist Unable to Participate   Assistive Device   (n/a)   Distance (Feet) 0   # of Times Distance was Traveled 0   Deviation   (Limited by WB precautions, medical status)   Wheelchair Functional Level of Assist   Wheelchair Assist   (Unable/unsafe)   Distance Wheelchair (Feet or Distance) 0   Wheelchair Description   (Limited by medical status)   Stairs Functional Level of Assist   Level of Assist with Stairs Unable to Participate   # of Stairs Climbed 0   Stairs Description Safety concerns  (Limited by WB precautions, medical status)   Transfer Functional Level of Assist   Bed, Chair, Wheelchair Transfer   (4-person  "lateral transfer board from bed to bed)   Bed Chair Wheelchair Transfer Description Adaptive equipment;Assist with two limbs;Increased time;Initial preparation for task;Requires lift;Set-up of equipment;Supervision for safety;Verbal cueing;Other (comment)  (Consider use of Jia at this time. Low air loss mattress bed now in place as of this session.)   Toilet Transfers Unable to Participate   Toilet Transfer Description   (Safety concerns)   Supine Lower Body Exercise   Comments Attempted supine ther ex 3x but pt unable to keep alert, attention, and follow directions.   Bed Mobility    Supine to Sit Total Assist X 2   Sit to Supine Total Assist X 2   Sit to Stand Unable to Participate  (due to WB precautions and medical status)   Scooting   (4-person lateral transfer board from bed to bed)   Rolling Total Assist X 2 to Rt.;Total Assist X 2 to Lt.   Interdisciplinary Plan of Care Collaboration   IDT Collaboration with  Nursing;Respiratory Therapist;Certified Nursing Assistant;Physician;Therapy Tech   Patient Position at End of Therapy In Bed;Bed Alarm On;Call Light within Reach;Tray Table within Reach;Phone within Reach   Collaboration Comments Tech A throughout; therapy scheduling afterwards; MD re medical status, requested hold, requested 15 hours over 7 days; RN for IV ABX; charge RN for low air loss mattress bed; CNA A with bed to bed transfer, use Jia lift for transfers at this time         Assessment    Patient unable to perform multiple bed mobility without 2-person A throughout. Delayed responses, variable alertness, reports \"only 7-8\" antalgia, requires repetition of short questions due to inattention to task.  Limited participation.  Receptive to verbal education on pt's goals, skin protection, review of precautions, and POC; but will require structured setting to improve performance when alertness improves.    Strengths: Supportive family  Barriers: Decreased endurance, Fatigue, Generalized weakness, " "Impaired activity tolerance, Impaired balance, Limited mobility, Pain, Pain poorly managed (WB precautions)    Plan    Trial EOB balance, assess slide board transfer (caution: skin is compromised), bed mobility, therapeutic exercise.    DME  PT DME Recommendations  Wheelchair:  (has wc)  Cushion: Specialty (See other comments) (has standard cushion, but skin is currently compromised, recommend specialty cushion for pressure relief)  Additional Equipment: Transfer Board (30\" Length)    Passport items to be completed:  Get in/out of bed safely, in/out of a vehicle, safely use/maintain mobility device, navigate around home/community, show how to get up/down from the ground, demonstrate pressure relief/ perform skin check, ensure home is accessible, demonstrate HEP, complete caregiver training    Physical Therapy Problems (Active)       Problem: Mobility       Dates: Start:  12/23/23         Goal: STG-Within one week, patient will propel wheelchair household distances >50 ft indoors x 2 SBA       Dates: Start:  12/23/23               Problem: Mobility Transfers       Dates: Start:  12/23/23         Goal: STG-Within one week, patient will transfer bed to chair with slide board mod A x 1       Dates: Start:  12/23/23            Goal: STG-Within one week, patient will move supine to sit mod A       Dates: Start:  12/23/23               Problem: PT-Long Term Goals       Dates: Start:  12/23/23         Goal: LTG-By discharge, patient will propel wheelchair mod I >150 ft over various surfaces, including ramp to enter/ exit home        Dates: Start:  12/23/23            Goal: LTG-By discharge, patient will transfer one surface to another mod I slide board wc <> bed, while maintaining WB precautions       Dates: Start:  12/23/23              "

## 2023-12-25 NOTE — FLOWSHEET NOTE
12/25/23 0637   Events/Summary/Plan   Events/Summary/Plan SVN and flutter   Vital Signs   Pulse 86   Respiration 18   Pulse Oximetry 95 %   $ Pulse Oximetry (Spot Check) Yes   Respiratory Assessment   Level of Consciousness Alert   Chest Exam   Work Of Breathing / Effort Within Normal Limits;Mild   Breath Sounds   RUL Breath Sounds Diminished   RML Breath Sounds Diminished   RLL Breath Sounds Diminished   DONALD Breath Sounds Diminished   LLL Breath Sounds Diminished   Oxygen   O2 (LPM) 5  (02 titrated to 4.5)   O2 Delivery Device Oxymask

## 2023-12-25 NOTE — FLOWSHEET NOTE
12/24/23 1813   Events/Summary/Plan   Events/Summary/Plan SVN and flutter   Vital Signs   Pulse 89  (post 86)   Respiration 20  (post 20)   Pulse Oximetry 97 %   $ Pulse Oximetry (Spot Check) Yes   Respiratory Assessment   Level of Consciousness Alert   Chest Exam   Work Of Breathing / Effort Within Normal Limits;Mild   Breath Sounds   RUL Breath Sounds Diminished  (increased aeration T/O)   RML Breath Sounds Diminished   RLL Breath Sounds Diminished   DONALD Breath Sounds Diminished   LLL Breath Sounds Diminished   Oxygen   O2 (LPM) 5   O2 Delivery Device Oxymask

## 2023-12-25 NOTE — CARE PLAN
"The patient is Watcher - Medium risk of patient condition declining or worsening    Shift Goals  Clinical Goals: safety  Patient Goals: safety, pain control    Progress made toward(s) clinical / shift goals:    Problem: Fall Risk - Rehab  Goal: Patient will remain free from falls  Outcome: Progressing. Patient bed in lowest locked position with bed alarm on and call light within reach. Patient calls appropriately with call light for needs and has not attempted to self transfer over shift.      Problem: Pain - Standard  Goal: Alleviation of pain or a reduction in pain to the patient’s comfort goal  Outcome: Progressing. Patient refused scheduled morphine over night stating pain was tolerable and he was aware he was \"drowsy\". Pain medication held per request. VSS. Patient has scheduled q4h morphine 30 mg as well as scheduled bid 15 mg ER Morphine for pain. Patient also has prn oxycodone available for breakthrough pain but declined over shift.              "

## 2023-12-25 NOTE — THERAPY
Occupational Therapy  Daily Treatment     Patient Name: Richard Hubbard II  Age:  48 y.o., Sex:  male  Medical Record #: [id number]  Today's Date: 12/25/2023     Precautions  Precautions: Fall Risk, Non Weight Bearing Right Lower Extremity, Non Weight Bearing Left Lower Extremity  Comments: (P) EX Fix to RLE         Subjective    Patient agreeable to therapy and willing to sit up on EOB     Objective       12/25/23 1031   OT Charge Group   OT Therapy Activity (Units) 2   OT Therapeutic Exercise (Units) 2   OT Total Time Spent   OT Individual Total Time Spent (Mins) 60   Precautions   Precautions Fall Risk;Non Weight Bearing Right Lower Extremity;Non Weight Bearing Left Lower Extremity   Comments EX Fix to RLE   Vitals   Pulse Oximetry 96 %   O2 (LPM) 5   Vitals Comments BP 96/46 Initial, 102/45 2 minutes, 112/54 10 minutes, 106/46 20 minutes   Pain 0 - 10 Group   Therapist Pain Assessment During Activity;6   Sleep/Wake Cycle   Sleep & Rest Awake   Sitting Upper Body Exercises   Chest Fly 3 sets of 10;Bilateral;Medium Resistance Theraband   Chest Press 3 sets of 10;Bilateral;Medium Resistance Theraband   Lat Pull 3 sets of 10;Bilateral;Medium Resistance Theraband   Bicep Curls 3 sets of 10;Bilateral;Medium Resistance Theraband   Balance   Sitting Balance (Static) Good   Sitting Balance (Dynamic) Good   Bed Mobility    Supine to Sit Minimal Assist   Sit to Supine Total Assist X 2   Scooting Unable to Participate   Rolling Minimal Assist to Rt.;Minimum Assist to Lt.   Skilled Intervention Verbal Cuing;Tactile Cuing   Strengths & Barriers   Strengths Able to follow instructions;Effective communication skills;Independent prior level of function;Pleasant and cooperative;Supportive family   Barriers Decreased endurance;Fatigue;Generalized weakness;Impaired activity tolerance;Impaired balance;Limited mobility;Pain         Assessment    Patient tolerated session fair. He sat to EOB with min A to manage RLE. He was  "initially dizzy when he sat up and his BP was low. OT checked with RN who stated since his BP was trending up to monitor and continue. Patient participated with UE exercises demonstrating good sitting balance, but poor core strength and had to lean over onto LUE on bed frequently for breaks. Patient tolerated sitting EOB for 40 minutes once he was up sitting participating in exercises with frequent, extended rest breaks. He was max x 2 to return to supine and repositioning was TD x 2.   Strengths: (P) Able to follow instructions, Effective communication skills, Independent prior level of function, Pleasant and cooperative, Supportive family  Barriers: (P) Decreased endurance, Fatigue, Generalized weakness, Impaired activity tolerance, Impaired balance, Limited mobility, Pain    Plan    Continue OT POC    DME  OT DME Recommendations  Bathroom Equipment: Drop Arm Commode  Additional Equipment: Transfer Board (30\" Length)    Passport items to be completed:  Perform bathroom transfers, complete dressing, complete feeding, get ready for the day, prepare a simple meal, participate in household tasks, adapt home for safety needs, demonstrate home exercise program, complete caregiver training     Occupational Therapy Goals (Active)       Problem: Bathing       Dates: Start:  12/23/23         Goal: STG-Within one week, patient will bathe with Min A.       Dates: Start:  12/23/23               Problem: Dressing       Dates: Start:  12/23/23         Goal: STG-Within one week, patient will dress UB with SBA.       Dates: Start:  12/23/23            Goal: STG-Within one week, patient will dress LB with Max/Mod A.       Dates: Start:  12/23/23               Problem: Functional Transfers       Dates: Start:  12/23/23         Goal: STG-Within one week, patient will transfer to toilet with Max A.       Dates: Start:  12/23/23               Problem: OT Long Term Goals       Dates: Start:  12/23/23         Goal: LTG-By discharge, " patient will complete basic self care tasks with CGA/Supervision.       Dates: Start:  12/23/23            Goal: LTG-By discharge, patient will perform bathroom transfers with CGA.       Dates: Start:  12/23/23

## 2023-12-25 NOTE — PROGRESS NOTES
"  Physical Medicine & Rehabilitation Progress Note    Encounter Date: 12/25/2023    Chief Complaint: Pain in bilateral lower extremities and shortness of breath    Interval Events (Subjective):    Patient was evaluated in his room lying comfortably in bed.  He reports his pain is improved with scheduling morphine.   He is still having difficulty with breathing but has improved.  In discussions with respiratory therapy were able to decrease his oxygen needs from 5 L to 4 L today.  He is still mouth breathing and requiring mask instead of nasal cannula.  He is participating better with therapy today.    Last BM: 12/19/23 (refused lactulose, agreed to senna scheduled)    ROS:  Gen: No fatigue, confusion, significant weight loss  Eyes: no blurry vision, double vision or pain in eyes  ENT: no changes in hearing, runny nose, nose bleeds, sinus pain  CV: No CP, palpitations  Lungs: improved shortness of breath, no changes in secretions, changes in cough, pain with coughing  Abd: No abd pain, nausea, vomiting, pain with eating  : no blood in urine, pain during storage phase, bladder spasms, suprapubic pain  Ext: No swelling in legs, asymmetric atrophy  Neuro: no changes in strength or sensation  Skin: no new ulcers/skin breakdown appreciated by patient  Mood: No changes in mood today, increase in depression or anxiety  Heme: no bruising, or bleeding    Objective:  Vitals: /55   Pulse 86   Temp 36.4 °C (97.6 °F) (Oral)   Resp 18   Ht 1.803 m (5' 11\")   Wt 97.5 kg (214 lb 15.2 oz)   SpO2 95%   Gen: NAD, Body mass index is 29.98 kg/m².  HEENT: NC/AT, PERRLA, moist mucous membranes  Cardio: RRR, no mumurs  Pulm: Improved aeration, crackles and rhonchi present at bilateral bases  Abd: Soft NTND, active bowel sounds,  Ext: No calf tenderness on the right. No clubbing/cyanosis. +dorsal pedalis pulses bilaterally.  Pin sites are clean dry and intact no erythema  Residual limb on the left is clean dry and intact no " significant swelling    Laboratory Values:  Recent Results (from the past 72 hour(s))   CBC with Differential    Collection Time: 12/23/23  5:25 AM   Result Value Ref Range    WBC 10.1 4.8 - 10.8 K/uL    RBC 3.10 (L) 4.70 - 6.10 M/uL    Hemoglobin 8.2 (L) 14.0 - 18.0 g/dL    Hematocrit 27.9 (L) 42.0 - 52.0 %    MCV 90.0 81.4 - 97.8 fL    MCH 26.5 (L) 27.0 - 33.0 pg    MCHC 29.4 (L) 32.3 - 36.5 g/dL    RDW 54.5 (H) 35.9 - 50.0 fL    Platelet Count 238 164 - 446 K/uL    MPV 10.8 9.0 - 12.9 fL    Neutrophils-Polys 82.70 (H) 44.00 - 72.00 %    Lymphocytes 11.30 (L) 22.00 - 41.00 %    Monocytes 3.50 0.00 - 13.40 %    Eosinophils 1.00 0.00 - 6.90 %    Basophils 0.30 0.00 - 1.80 %    Immature Granulocytes 1.20 (H) 0.00 - 0.90 %    Nucleated RBC 0.60 (H) 0.00 - 0.20 /100 WBC    Neutrophils (Absolute) 8.32 (H) 1.82 - 7.42 K/uL    Lymphs (Absolute) 1.14 1.00 - 4.80 K/uL    Monos (Absolute) 0.35 0.00 - 0.85 K/uL    Eos (Absolute) 0.10 0.00 - 0.51 K/uL    Baso (Absolute) 0.03 0.00 - 0.12 K/uL    Immature Granulocytes (abs) 0.12 (H) 0.00 - 0.11 K/uL    NRBC (Absolute) 0.06 K/uL    Anisocytosis 1+     Macrocytosis 1+    Comp Metabolic Panel (CMP)    Collection Time: 12/23/23  5:25 AM   Result Value Ref Range    Sodium 129 (L) 135 - 145 mmol/L    Potassium 4.2 3.6 - 5.5 mmol/L    Chloride 95 (L) 96 - 112 mmol/L    Co2 21 20 - 33 mmol/L    Anion Gap 13.0 7.0 - 16.0    Glucose 89 65 - 99 mg/dL    Bun 20 8 - 22 mg/dL    Creatinine 0.75 0.50 - 1.40 mg/dL    Calcium 7.9 (L) 8.5 - 10.5 mg/dL    Correct Calcium 8.8 8.5 - 10.5 mg/dL    AST(SGOT) 12 12 - 45 U/L    ALT(SGPT) 12 2 - 50 U/L    Alkaline Phosphatase 125 (H) 30 - 99 U/L    Total Bilirubin 1.2 0.1 - 1.5 mg/dL    Albumin 2.9 (L) 3.2 - 4.9 g/dL    Total Protein 6.6 6.0 - 8.2 g/dL    Globulin 3.7 (H) 1.9 - 3.5 g/dL    A-G Ratio 0.8 g/dL   HEMOGLOBIN A1C    Collection Time: 12/23/23  5:25 AM   Result Value Ref Range    Glycohemoglobin 4.9 4.0 - 5.6 %    Est Avg Glucose 94 mg/dL    Lipid Profile (Lipid Panel)    Collection Time: 12/23/23  5:25 AM   Result Value Ref Range    Cholesterol,Tot 69 (L) 100 - 199 mg/dL    Triglycerides 66 0 - 149 mg/dL    HDL 32 (A) >=40 mg/dL    LDL 24 <100 mg/dL   Magnesium    Collection Time: 12/23/23  5:25 AM   Result Value Ref Range    Magnesium 2.3 1.5 - 2.5 mg/dL   Phosphorus    Collection Time: 12/23/23  5:25 AM   Result Value Ref Range    Phosphorus 2.5 2.5 - 4.5 mg/dL   Prothrombin time    Collection Time: 12/23/23  5:25 AM   Result Value Ref Range    PT 20.3 (H) 12.0 - 14.6 sec    INR 1.71 (H) 0.87 - 1.13   TSH with Reflex to FT4    Collection Time: 12/23/23  5:25 AM   Result Value Ref Range    TSH 8.390 (H) 0.380 - 5.330 uIU/mL   Vitamin D, 25-hydroxy (blood)    Collection Time: 12/23/23  5:25 AM   Result Value Ref Range    25-Hydroxy   Vitamin D 25 17 (L) 30 - 100 ng/mL   ESTIMATED GFR    Collection Time: 12/23/23  5:25 AM   Result Value Ref Range    GFR (CKD-EPI) 112 >60 mL/min/1.73 m 2   FREE THYROXINE    Collection Time: 12/23/23  5:25 AM   Result Value Ref Range    Free T-4 1.33 0.93 - 1.70 ng/dL   PLATELET ESTIMATE    Collection Time: 12/23/23  5:25 AM   Result Value Ref Range    Plt Estimation PLTCONFIRM    MORPHOLOGY    Collection Time: 12/23/23  5:25 AM   Result Value Ref Range    RBC Morphology Present     Polychromia 1+     Poikilocytosis 1+     Ovalocytes 1+    PERIPHERAL SMEAR REVIEW    Collection Time: 12/23/23  5:25 AM   Result Value Ref Range    Peripheral Smear Review see below    DIFFERENTIAL COMMENT    Collection Time: 12/23/23  5:25 AM   Result Value Ref Range    Comments-Diff see below    CBC WITH DIFFERENTIAL    Collection Time: 12/24/23  5:00 AM   Result Value Ref Range    WBC 9.5 4.8 - 10.8 K/uL    RBC 3.13 (L) 4.70 - 6.10 M/uL    Hemoglobin 8.3 (L) 14.0 - 18.0 g/dL    Hematocrit 28.0 (L) 42.0 - 52.0 %    MCV 89.5 81.4 - 97.8 fL    MCH 26.5 (L) 27.0 - 33.0 pg    MCHC 29.6 (L) 32.3 - 36.5 g/dL    RDW 55.2 (H) 35.9 - 50.0 fL     Platelet Count 255 164 - 446 K/uL    MPV 10.6 9.0 - 12.9 fL    Neutrophils-Polys 80.50 (H) 44.00 - 72.00 %    Lymphocytes 13.10 (L) 22.00 - 41.00 %    Monocytes 3.50 0.00 - 13.40 %    Eosinophils 1.20 0.00 - 6.90 %    Basophils 0.40 0.00 - 1.80 %    Immature Granulocytes 1.30 (H) 0.00 - 0.90 %    Nucleated RBC 1.30 (H) 0.00 - 0.20 /100 WBC    Neutrophils (Absolute) 7.69 (H) 1.82 - 7.42 K/uL    Lymphs (Absolute) 1.25 1.00 - 4.80 K/uL    Monos (Absolute) 0.33 0.00 - 0.85 K/uL    Eos (Absolute) 0.11 0.00 - 0.51 K/uL    Baso (Absolute) 0.04 0.00 - 0.12 K/uL    Immature Granulocytes (abs) 0.12 (H) 0.00 - 0.11 K/uL    NRBC (Absolute) 0.12 K/uL   Basic Metabolic Panel    Collection Time: 12/24/23  5:00 AM   Result Value Ref Range    Sodium 128 (L) 135 - 145 mmol/L    Potassium 5.2 3.6 - 5.5 mmol/L    Chloride 96 96 - 112 mmol/L    Co2 20 20 - 33 mmol/L    Glucose 96 65 - 99 mg/dL    Bun 25 (H) 8 - 22 mg/dL    Creatinine 0.90 0.50 - 1.40 mg/dL    Calcium 8.3 (L) 8.5 - 10.5 mg/dL    Anion Gap 12.0 7.0 - 16.0   PROCALCITONIN    Collection Time: 12/24/23  5:00 AM   Result Value Ref Range    Procalcitonin 1.20 (H) <0.25 ng/mL   IRON/TOTAL IRON BIND    Collection Time: 12/24/23  5:00 AM   Result Value Ref Range    Iron 21 (L) 50 - 180 ug/dL    Total Iron Binding 249 (L) 250 - 450 ug/dL    Unsat Iron Binding 228 110 - 370 ug/dL    % Saturation 8 (L) 15 - 55 %   ESTIMATED GFR    Collection Time: 12/24/23  5:00 AM   Result Value Ref Range    GFR (CKD-EPI) 106 >60 mL/min/1.73 m 2   MRSA By PCR (Amp)    Collection Time: 12/24/23 12:30 PM    Specimen: Nares; Respirate   Result Value Ref Range    MRSA by PCR Negative Negative   Basic Metabolic Panel    Collection Time: 12/25/23  5:30 AM   Result Value Ref Range    Sodium 126 (L) 135 - 145 mmol/L    Potassium 6.2 (H) 3.6 - 5.5 mmol/L    Chloride 94 (L) 96 - 112 mmol/L    Co2 17 (L) 20 - 33 mmol/L    Glucose 89 65 - 99 mg/dL    Bun 32 (H) 8 - 22 mg/dL    Creatinine 1.40 0.50 - 1.40 mg/dL     Calcium 8.2 (L) 8.5 - 10.5 mg/dL    Anion Gap 15.0 7.0 - 16.0   proBrain Natriuretic Peptide, NT    Collection Time: 12/25/23  5:30 AM   Result Value Ref Range    NT-proBNP 03415 (H) 0 - 125 pg/mL   ESTIMATED GFR    Collection Time: 12/25/23  5:30 AM   Result Value Ref Range    GFR (CKD-EPI) 62 >60 mL/min/1.73 m 2       Medications:  Scheduled Medications   Medication Dose Frequency    sodium chloride  2 g TID WITH MEALS    sodium zirconium cyclosilicate  15 g Once    ipratropium-albuterol  3 mL TID    linezolid  600 mg Q12HRS    morphine  30 mg Q4HRS    vitamin D3  2,000 Units DAILY    Pharmacy Consult Request  1 Each PHARMACY TO DOSE    acetaminophen  1,000 mg TID    therapeutic multivitamin-minerals  1 Tablet DAILY WITH LUNCH    apixaban  5 mg BID    cefepime  2 g Q8HRS    dapagliflozin propanediol  10 mg DAILY    DULoxetine  60 mg DAILY    lactulose  30 mL TID    miconazole   BID    morphine ER  15 mg Q12HRS    omeprazole  20 mg DAILY    senna-docusate  2 Tablet BID    normal saline (PF)  20 mL Q12HRS     PRN medications: oxyCODONE immediate-release, Respiratory Therapy Consult, acetaminophen **FOLLOWED BY** [START ON 12/27/2023] acetaminophen, hydrALAZINE, acetaminophen, carboxymethylcellulose, benzocaine-menthol, mag hydrox-al hydrox-simeth, ondansetron **OR** ondansetron, traZODone, sodium chloride, senna-docusate **AND** polyethylene glycol/lytes **AND** magnesium hydroxide **AND** bisacodyl, albuterol    Diet:  Current Diet Order   Procedures    Diet Order Diet: Regular       Assessment:  Active Hospital Problems    Diagnosis     *Unilateral complete BKA, left, initial encounter ([facility])     Hypotension     BKA stump complication (HCC)     Bacteremia due to Staphylococcus aureus     Anemia     Chronic diastolic heart failure with preserved ejection fraction (HCC)     CHF (congestive heart failure), NYHA class I, acute on chronic, combined (HCC)     Acute combined systolic and diastolic heart failure ([facility])      Acute kidney injury (HCC)     Hyponatremia     Chronic use of opiate drug for therapeutic purpose     Atrial fibrillation with RVR (HCC)     Aortic root dilatation (HCC)     Chronic pain of right knee        Medical Decision Making and Plan:    Left residual limb infection and osteomyelitis requiring BKA revision:  -Continue comprehensive acute inpatient rehabilitation  -PT and OT for mobility and ADLs. Per guidelines, 15 hours per week between PT, OT.  -Follow-up orthopedic surgery  -Nonweightbearing left lower extremity  -Dressing changes daily       Multiple right lower extremity surgical interventions: open reduction of the right subtalar joint dislocation, right talonavicular joint dislocation right foot triple arthrodesis, right extensor tendon lengthening and application of multiplanar external fixator on the right lower extremity on 12/6.   -Right lower extremity external fixator, pain management as per nursing protocol  - Expected removal of Ex-Fix in 8 to 12 weeks  -Toe-touch weightbearing on right lower extremity for transfers only     CV: CHF, severe aortic stenosis, moderate aortic insufficiency, moderate to severe tricuspid regurg and RSVP of 66  -Cardiology was consulted and recommending TAVR in the next month.  -Will hold beta-blocker/ACE/ARB given hypotension  -Follow-up with cardiology as outpatient  -Transition to regular diet secondary to poor intake of cardiovascular diet     Rheumatoid arthritis: on Humira at home  - discussed with patient about bringing home medication and regular administration.     stump abscess/osteomyelitis complicated with MSSA bacteremia: Previous abscess and osteomyelitis 10/2023, revision as above 12/16  - Cefepime 2 g IV every 8 hours  -Transition to ertapenem on discharge until 1/23  -CBC with differential and CMP weekly  - Follow-up with ID as outpatient     Chronic pain: Severe pain overnight and this morning limiting her participation with therapy  -MS Contin  15 mg every 12 hours  - Transition to home dose morphine IR 30 mg 4 hours scheduled  -Oxycodone 10 mg every 4 hours as needed severe pain  - Cymbalta 60 mg daily     Postoperative pain:  - Tylenol 1000 mg 3 times daily, given high-dose Tylenol will check LFTs in a.m.  - Morphine IR 30 mg every 4 hours scheduled  - Oxycodone 10 mg every 4 hours as needed severe pain, discussed risks of respiratory suppression     Leukocytosis: WBC of 12.2 on 12/22 -> 10.1 on 12/23  -Check CBC in a.m.     Hyponatremia: Sodium of 129 on 12/23 -> sodium of 126 on 12/25  -Start patient on sodium chloride tablets 1 g 3 times daily  -Check CMP in a.m. as per hospitalist  - Consult hospitalist, appreciate input     Hyperkalemia: Potassium of 6.2  - Consulted hospitalist, discussed with Dr. Collins  - Rechecking potassium at 3 PM      Acute kidney injury: Creatinine of 1.4 on 12/25  - Check CMP in a.m.  - Encourage p.o. fluid intake  - Consult hospitalist    Respiratory failure with hypoxia  -Consult respiratory therapy  -Start on DuoNeb 3 times daily and hypertonic saline as mucolytic 3 times daily.  Discussed Aerobika  - Supplemental oxygen as per guidelines, currently on supplemental oxygen of 4 L via nasal cannula/OxyMask  - Consult hospitalist discussed with Dr. 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 intake, will transition to regular diet     GI Ppx - Patient on Prilosec for GERD prophylaxis. Patient on Senna-docusate for constipation prophylaxis.      DVT Ppx - Patient apixaban 5 mg every 12 hours on transfer.  ____________________________________    Alexx Khan DO  [facility] - Physical Medicine & Rehabilitation   [facility] - Spinal Cord Injury Medicine  ____________________________________    total time:  56 minutes.   I spent greater than 50% of the time for patient care and coordination on this date, including unit/floor time, and face-to-face time with the patient as per assessment and plan above.  Discussion included evaluation of labs, natremia, discussed with hospitalist, recheck labs, KATERYNA, check CMP in a.m. as per hospitalist, respiratory failure,DuoNeb 3 times daily and hypertonic saline for mucolytic

## 2023-12-26 PROBLEM — J96.90 RESPIRATORY FAILURE (HCC): Status: ACTIVE | Noted: 2023-01-01

## 2023-12-26 PROBLEM — I26.99 PULMONARY EMBOLISM (HCC): Status: ACTIVE | Noted: 2023-01-01

## 2023-12-26 PROBLEM — R79.89 AZOTEMIA: Status: ACTIVE | Noted: 2023-01-01

## 2023-12-26 PROBLEM — I45.10 RIGHT BUNDLE BRANCH BLOCK: Status: ACTIVE | Noted: 2023-01-01

## 2023-12-26 PROBLEM — I50.22 CHRONIC HFREF (HEART FAILURE WITH REDUCED EJECTION FRACTION) (HCC): Status: ACTIVE | Noted: 2023-01-01

## 2023-12-26 PROBLEM — R41.0 CONFUSION: Status: ACTIVE | Noted: 2023-01-01

## 2023-12-26 PROBLEM — R94.6 BORDERLINE ABNORMAL TFTS: Status: ACTIVE | Noted: 2023-01-01

## 2023-12-26 PROBLEM — N17.9 AKI (ACUTE KIDNEY INJURY) (HCC): Status: ACTIVE | Noted: 2023-01-01

## 2023-12-26 PROBLEM — J96.01 ACUTE RESPIRATORY FAILURE WITH HYPOXIA (HCC): Status: ACTIVE | Noted: 2023-01-01

## 2023-12-26 PROBLEM — E87.5 HYPERKALEMIA: Status: ACTIVE | Noted: 2023-01-01

## 2023-12-26 NOTE — ASSESSMENT & PLAN NOTE
Bun/Cr: 32/1.40 --> 42/2.10  Off Lasix  Encouraging fluid intake  F/U renal US  Will get urine lytes  Cont to monitor

## 2023-12-26 NOTE — PROGRESS NOTES
Spoke with Dr. Stark radiology regarding presence of septic emboli in the lungs on TAVR planning CT. Continue with present regimen of Abx and eliquis. Patient to review data with ID.

## 2023-12-26 NOTE — DOCUMENTATION QUERY
[facility]                                                                       Query Response Note      PATIENT:               LESLIE BADILLO  ACCT #:                  [id number]  MRN:                     [id number]  :                      1975  ADMIT DATE:       2023 11:59 AM  DISCH DATE:        2023 5:07 PM  RESPONDING  PROVIDER #:        [id number]           QUERY TEXT:    Deep tissue pressure injury sacrum first assessed  is noted in the  Wound Team Eval.   Can you clarify if this is a relevant diagnosis?      The patient's Clinical Indicators include:   Wound Eval:  Pressure injury sacrum left DTI, first assessed 23  Risk Factors: decreased mobility, shock  Treatment: Wound Team Eval, reposition q 2 h, TAPs turning system, waffle overlay    Thank you,  Di Carrera RN, BSN, CCDS  Clinical   Connect via [employer]  Options provided:   -- Deep tissue pressure injury sacrum not present on admission ruled in   -- Deep tissue pressure injury sacrum present on admission ruled in   -- Other explanation of clinical findings, please specify   -- Findings of no clinical significance      Query created by: Di Carrera on 2023 8:11 AM    RESPONSE TEXT:    Deep tissue pressure injury sacrum not present on admission ruled in          Electronically signed by:  YASMINE ROSALES MD 2023 5:28 PM

## 2023-12-26 NOTE — THERAPY
Missed Therapy    Patient Name: Richard Hubbard II  Age:  48 y.o., Sex:  male  Medical Record #: [id number]  Today's Date: 12/26/2023    Discussed missed therapy with MD, Nursing, and scheduling      12/26/23 1101   Therapy Missed   Missed Therapy (Minutes) 30   Reason For Missed Therapy Medical - Patient on Hold from Therapy   Vitals   Pulse 86   Patient BP Position Supine   Blood Pressure 98/59   Pulse Oximetry 95 %   O2 (LPM) 4   O2 Delivery Device Nasal Cannula   Interdisciplinary Plan of Care Collaboration   IDT Collaboration with  Nursing   Patient Position at End of Therapy In Bed;Phone within Reach;Tray Table within Reach;Call Light within Reach   Collaboration Comments Communicated to RN about shallow breathing and cold sweats unpon arrival       Communicated to nursing about pt cold sweats and shallow breathing. Pt was able to answer orientation questions, however would repeat 2-3x after each question was asked.

## 2023-12-26 NOTE — ASSESSMENT & PLAN NOTE
K+: 6.2 --> 5.9 (12/26)  S/P Lokelma x 2 doses  Will start Lokelma x 4 dose  Likely 2nd to renal failure  Cont to monitor

## 2023-12-26 NOTE — WOUND TEAM
[facility]  Inpatient Services  Wound and Skin Care Follow-up    Admission Date: 12/22/2023     Last order of IP CONSULT TO WOUND CARE was found on 12/22/2023 from Hospital Encounter on 12/19/2023     HPI, PMH, SH: Reviewed    Past Surgical History:   Procedure Laterality Date    KNEE AMPUTATION BELOW Left 12/16/2023    Procedure: debridement, secondary closure of left revision amputation;  Surgeon: You Olivares M.D.;  [place];  Service: Orthopedics    ORIF, ANKLE Right 12/6/2023    Procedure: RIGHT SUBTALAR DISLOCATION OPEN REDUCTION INTERNAL FIXATION, TALONAVICULAR OPEN REDUCTION INTERNAL FIXATION, ACHILLES TENDON LENGTHENING, TRIPLE ARTHRODESIS, RIGHT LOWER EXTREMITY APPLICATION OF MULTIPLANAR EXTERNAL FIXATOR;  Surgeon: Calvin Pryor M.D.;  [place];  Service: Orthopedics    INCISION AND DRAINAGE ORTHOPEDIC Left 10/22/2023    Procedure: REVISION, BELOW KNEE AMPUTATION;  Surgeon: River Colindres M.D.;  [place];  Service: Orthopedics    IRRIGATION & DEBRIDEMENT GENERAL Left 10/19/2023    Procedure: IRRIGATION AND DEBRIDEMENT, WOUND;  Surgeon: Jay Roe M.D.;  [place];  Service: Trauma    KNEE AMPUTATION BELOW Left 03/09/2023    Procedure: AMPUTATION, BELOW KNEE;  Surgeon: Wayne Chambers M.D.;  [place];  Service: Orthopedics    PB TOTAL KNEE ARTHROPLASTY Right 07/22/2020    Procedure: ARTHROPLASTY, KNEE, TOTAL;  Surgeon: Temo Pires M.D.;  Location: [facility];  Service: Orthopedics    TENDON TRANSFER Right 01/22/2020    Procedure: RIGHT DISTAL ULNA RESECTION AND EXTENSOR TENDON TRANSFER;  Surgeon: Marine Rushing M.D.;  Location: [facility];  Service: Orthopedics    OTHER ORTHOPEDIC SURGERY Right 2020    total right knee replacement    IRRIGATION & DEBRIDEMENT ORTHO Left 10/09/2019    Procedure: IRRIGATION AND DEBRIDEMENT, WOUND  - PELVIC OSTEOMYELITIS;  Surgeon: You Olivares M.D.;  Location: SURGERY [facility];  Service: Orthopedics    OTHER SURGICAL PROCEDURE  2019    osteomelitis of pelvis cleaned    GASTRIC BYPASS LAPAROSCOPIC      Lucila en y    CHOLECYSTECTOMY      OTHER SURGICAL PROCEDURE      repair of broken penis.  23:  Patient denies any surgery on penis.     Social History     Tobacco Use    Smoking status: Some Days     Current packs/day: 0.00     Types: Cigarettes, Cigars     Last attempt to quit: 2023     Years since quittin.8     Passive exposure: Current    Smokeless tobacco: Never    Tobacco comments:     occasional cigar.  23:  Patient declines smoking cessation information at this time.    Substance Use Topics    Alcohol use: No     Alcohol/week: 0.0 oz     Comment: quit  ago- past ETOH abuse     No chief complaint on file.    Diagnosis: Unilateral complete BKA, left, initial encounter ([facility]) [S88.112A]    Unit where seen by Wound Team:      WOUND FOLLOW UP RELATED TO:  sacrum       WOUND TEAM PLAN OF CARE - Frequency of Follow-up:   Nursing to follow dressing orders written for wound care. Contact wound team if area fails to progress, deteriorates or with any questions/concerns if something comes up before next scheduled follow up (See below as to whether wound is following and frequency of wound follow up)  Dressing changes by wound team:                   Weekly - sacrum     WOUND HISTORY:         Pressure injury to the sacrum     WOUND ASSESSMENT/LDA              Vascular:    SUSHMA:   No results found.    Lab Values:    Lab Results   Component Value Date/Time    WBC 10.9 (H) 2023 06:02 AM    WBC 8.4 10/19/2010 12:05 PM    RBC 2.95 (L) 2023 06:02 AM    RBC 4.96 10/19/2010 12:05 PM    HEMOGLOBIN 7.7 (L) 2023 06:02 AM    HEMATOCRIT 26.1 (L) 2023 06:02 AM    CREACTPROT 0.43 2023 02:30 PM    SEDRATEWES 40 (H) 2023 02:30 PM    HBA1C 4.9 2023  05:25 AM         Culture Results show:  Recent Results (from the past 720 hour(s))   CULTURE WOUND W/ GRAM STAIN    Collection Time: 12/11/23  2:04 PM    Specimen: Left Leg; Wound   Result Value Ref Range    Significant Indicator POS (POS)     Source WND     Site LEFT LEG     Culture Result - (A)     Gram Stain Result Moderate WBCs.  No organisms seen.       Culture Result Serratia marcescens  Light growth   (A)        Susceptibility    Serratia marcescens - MARÍA     Ampicillin >16 Resistant mcg/mL     Amoxicillin/CA >16/8 Resistant mcg/mL     Ceftriaxone <=1 Sensitive mcg/mL     Cefazolin >16 Resistant mcg/mL     Ciprofloxacin <=0.25 Sensitive mcg/mL     Cefepime <=2 Sensitive mcg/mL     Cefuroxime >16 Resistant mcg/mL     Ampicillin/sulbactam >16/8 Resistant mcg/mL     Tobramycin <=2 Sensitive mcg/mL     Ertapenem <=0.5 Sensitive mcg/mL     Gentamicin <=2 Sensitive mcg/mL     Levofloxacin <=0.5 Sensitive mcg/mL     Minocycline <=4 Sensitive mcg/mL     Moxifloxacin <=2 Sensitive mcg/mL     Pip/Tazobactam <=8 Sensitive mcg/mL     Trimeth/Sulfa <=0.5/9.5 Sensitive mcg/mL     Tigecycline <=2 Sensitive mcg/mL       Pain Level/Medicated:  None, Tolerated without pain medication       INTERVENTIONS BY WOUND TEAM:  Chart and images reviewed. Discussed with bedside RN. All areas of concern (based on picture review, LDA review and discussion with bedside RN) have been thoroughly assessed. Documentation of areas based on significant findings. This RN in to assess patient. Performed standard wound care which includes appropriate positioning, dressing removal and non-selective debridement. Pictures and measurements obtained weekly if/when required.    Wound:  Sacrum  Preparation for Dressing removal: Open to air  Cleansed/Non-selectively Debrided with:  Moist warm washcloth  [place] wound: Cleansed with Moist warm washcloth, Prepped with Barrier paste  Primary Dressing:  barrier paste and YAMIL, offloading measures    Advanced Wound  Care Discharge Planning  Number of Clinicians necessary to complete wound care: 1  Is patient requiring IV pain medications for dressing changes:  No   Length of time for dressing change 10 min. (This does not include chart review, pre-medication time, set up, clean up or time spent charting.)    Interdisciplinary consultation: Patient, Bedside RN (Enrico), N/A.  Pressure injury and staging reviewed with N/A.    EVALUATION / RATIONALE FOR TREATMENT:     Date:  12/26/23  Wound Status:  No change in wound     Sacrum has open area to the right side.  Non-blanching and moist.  Barrier paste applied. Evolution of DTI that was previously there.  Continue with offloading measures and barrier paste as patient is very moist from incontinence and perspiration. Patient breathing hard with turning, pulse ox applied and oxygen saturation at 95% on 4L.  Respiratory has been seeing patient and also states that patient did not do well with treatment.  Bedside RN notified and rapid response was called.     Right ex-fix doing well and pin care was performed by bedside RN.          Goals: Steady decrease in wound area and depth weekly.    NURSING PLAN OF CARE ORDERS:  Skin care: See Skin Care orders  RN Prevention Protocol    NUTRITION RECOMMENDATIONS   Wound Team Recommendations:  N/A     DIET ORDERS (From admission to next 24h)       Start     Ordered    12/23/23 1057  [Order Hold - Suspended Admission]  Diet Order Diet: Regular  ALL MEALS        (On hold at INPATIENT REHAB since 12/26/23 1325)   Question:  Diet:  Answer:  Regular    12/23/23 1056    12/22/23 1712  [Order Hold - Suspended Admission]  Supplements  ONCE        (On hold at INPATIENT REHAB since 12/26/23 1325)   Question Answer Comment   Which Supplement Ensure    Ensure: Ensure Plus Carton BID       12/22/23 1712                    PREVENTATIVE INTERVENTIONS:   Q shift Nam - performed per nursing policy  Q shift pressure point assessments - performed per nursing  policy    Surface/Positioning  Standard/trauma mattress - Currently in Place  Reposition q 2 hours - Currently in Place  TAPs Turning system - Currently in Place  Waffle overlay  - Currently in Place    Offloading/Redistribution  Heel offloading dressing (Silicone dressing) - Currently in Place  Heel float boots (Prevalon boot) - Currently in Place  Heels floated with waffle overlay - Currently in Place      Respiratory  Silicone O2 tubing - Currently in Place  Gray Foam Ear protectors - Currently in Place    Containment/Moisture Prevention    Dri-su pad - Currently in Place  Barrier paste - Currently in Place    Mobilization      N/A     Anticipated discharge plans:  TBD        Vac Discharge Needs:  Vac Discharge plan is purely a recommendation from wound team and not a requirement for discharge unless otherwise stated by physician.  Not Applicable Pt not on a wound vac

## 2023-12-26 NOTE — ED NOTES
Per ERP, place piedra catheter. Piedra placed, pt tolerated well. Immediate urine return noted. Urine sample collected and sent to lab.

## 2023-12-26 NOTE — ED NOTES
Pt is arousable to voice, and oriented x2 to self and location. Pt placed on cardiac monitor, pulse ox, and automatic BP. VSS, saturating above 95% on 5L NC, SR w/ bundle in the in the 80-90s. Call light within reach and chart up for ERP.

## 2023-12-26 NOTE — ASSESSMENT & PLAN NOTE
Na: 128 --> 126 --> 127 (12/26)  On salt tabs --> will d/c 2nd to CHF and severe heart disease  Has diminished appetite and oral intake  Cont to monitor

## 2023-12-26 NOTE — PROGRESS NOTES
Physical Medicine & Rehabilitation Progress Note    Encounter Date: 12/26/2023    Chief Complaint: Decreased mobility, weakness    Interval Events (Subjective):  Patient sitting up in room. He is having difficulty staying awake but per staff he is oriented when he wakes up. He is having some deep breathing while sleep, his oxygen with 92-94.     _____________________________________  Interdisciplinary Team Conference   Most recent IDT on 12/26/2023    IKeaton M.D./Ph.D., was present and led the interdisciplinary team conference on 12/26/2023.  I led the IDT conference and agree with the IDT conference documentation and plan of care as noted below.     Nursing:  Diet Current Diet Order   Procedures    Diet Order Diet: Regular       Eating ADL Independent      % of Last Meal  Oral Nutrition: Between 25-50% Consumed   Sleep    Bowel Last BM: 12/19/23 (refused lactulose, agreed to senna scheduled)   Bladder    Barriers to Discharge Home:      Physical Therapy:  Bed Mobility    Transfers  (4-person lateral transfer board from bed to bed)  Adaptive equipment, Assist with two limbs, Increased time, Initial preparation for task, Requires lift, Set-up of equipment, Supervision for safety, Verbal cueing, Other (comment) (Consider use of Jia at this time. Low air loss mattress bed now in place as of this session.)   Mobility Unable to Participate   Stairs    Barriers to Discharge Home:      Occupational Therapy:  Grooming Standby Assist   Bathing Moderate Assist   UB Dressing Minimal Assist   LB Dressing Total Assist   Toileting Moderate Assist   Shower & Transfer    Barriers to Discharge Home:      Respiratory Therapy:  O2 (LPM): 4  O2 Delivery Device: Nasal Cannula    Case Management:  Continues to work on disposition and DME needs.      Discharge Date/Disposition:  TBD  _____________________________________      Objective:  VITAL SIGNS: /65   Pulse 87   Temp 36.8 °C (98.2 °F)   Resp (!) 22  "Comment: shallow, MD aware  Ht 1.803 m (5' 11\")   Wt 97.5 kg (214 lb 15.2 oz)   SpO2 95%   BMI 29.98 kg/m²   Gen: NAD  Psych: Mood and affect appropriate  CV: RRR, 1+ RLE edema  Resp: CTAB, no upper airway sounds  Abd: NTND  Neuro: AOx3, falling asleep mid sentence    Laboratory Values:  Recent Results (from the past 72 hour(s))   CBC WITH DIFFERENTIAL    Collection Time: 12/24/23  5:00 AM   Result Value Ref Range    WBC 9.5 4.8 - 10.8 K/uL    RBC 3.13 (L) 4.70 - 6.10 M/uL    Hemoglobin 8.3 (L) 14.0 - 18.0 g/dL    Hematocrit 28.0 (L) 42.0 - 52.0 %    MCV 89.5 81.4 - 97.8 fL    MCH 26.5 (L) 27.0 - 33.0 pg    MCHC 29.6 (L) 32.3 - 36.5 g/dL    RDW 55.2 (H) 35.9 - 50.0 fL    Platelet Count 255 164 - 446 K/uL    MPV 10.6 9.0 - 12.9 fL    Neutrophils-Polys 80.50 (H) 44.00 - 72.00 %    Lymphocytes 13.10 (L) 22.00 - 41.00 %    Monocytes 3.50 0.00 - 13.40 %    Eosinophils 1.20 0.00 - 6.90 %    Basophils 0.40 0.00 - 1.80 %    Immature Granulocytes 1.30 (H) 0.00 - 0.90 %    Nucleated RBC 1.30 (H) 0.00 - 0.20 /100 WBC    Neutrophils (Absolute) 7.69 (H) 1.82 - 7.42 K/uL    Lymphs (Absolute) 1.25 1.00 - 4.80 K/uL    Monos (Absolute) 0.33 0.00 - 0.85 K/uL    Eos (Absolute) 0.11 0.00 - 0.51 K/uL    Baso (Absolute) 0.04 0.00 - 0.12 K/uL    Immature Granulocytes (abs) 0.12 (H) 0.00 - 0.11 K/uL    NRBC (Absolute) 0.12 K/uL   Basic Metabolic Panel    Collection Time: 12/24/23  5:00 AM   Result Value Ref Range    Sodium 128 (L) 135 - 145 mmol/L    Potassium 5.2 3.6 - 5.5 mmol/L    Chloride 96 96 - 112 mmol/L    Co2 20 20 - 33 mmol/L    Glucose 96 65 - 99 mg/dL    Bun 25 (H) 8 - 22 mg/dL    Creatinine 0.90 0.50 - 1.40 mg/dL    Calcium 8.3 (L) 8.5 - 10.5 mg/dL    Anion Gap 12.0 7.0 - 16.0   PROCALCITONIN    Collection Time: 12/24/23  5:00 AM   Result Value Ref Range    Procalcitonin 1.20 (H) <0.25 ng/mL   IRON/TOTAL IRON BIND    Collection Time: 12/24/23  5:00 AM   Result Value Ref Range    Iron 21 (L) 50 - 180 ug/dL    Total Iron " Binding 249 (L) 250 - 450 ug/dL    Unsat Iron Binding 228 110 - 370 ug/dL    % Saturation 8 (L) 15 - 55 %   ESTIMATED GFR    Collection Time: 12/24/23  5:00 AM   Result Value Ref Range    GFR (CKD-EPI) 106 >60 mL/min/1.73 m 2   MRSA By PCR (Amp)    Collection Time: 12/24/23 12:30 PM    Specimen: Nares; Respirate   Result Value Ref Range    MRSA by PCR Negative Negative   Basic Metabolic Panel    Collection Time: 12/25/23  5:30 AM   Result Value Ref Range    Sodium 126 (L) 135 - 145 mmol/L    Potassium 6.2 (H) 3.6 - 5.5 mmol/L    Chloride 94 (L) 96 - 112 mmol/L    Co2 17 (L) 20 - 33 mmol/L    Glucose 89 65 - 99 mg/dL    Bun 32 (H) 8 - 22 mg/dL    Creatinine 1.40 0.50 - 1.40 mg/dL    Calcium 8.2 (L) 8.5 - 10.5 mg/dL    Anion Gap 15.0 7.0 - 16.0   proBrain Natriuretic Peptide, NT    Collection Time: 12/25/23  5:30 AM   Result Value Ref Range    NT-proBNP 29501 (H) 0 - 125 pg/mL   ESTIMATED GFR    Collection Time: 12/25/23  5:30 AM   Result Value Ref Range    GFR (CKD-EPI) 62 >60 mL/min/1.73 m 2   POTASSIUM SERUM (K)    Collection Time: 12/25/23  3:15 PM   Result Value Ref Range    Potassium 6.2 (H) 3.6 - 5.5 mmol/L   CBC WITHOUT DIFFERENTIAL    Collection Time: 12/26/23  6:02 AM   Result Value Ref Range    WBC 10.9 (H) 4.8 - 10.8 K/uL    RBC 2.95 (L) 4.70 - 6.10 M/uL    Hemoglobin 7.7 (L) 14.0 - 18.0 g/dL    Hematocrit 26.1 (L) 42.0 - 52.0 %    MCV 88.5 81.4 - 97.8 fL    MCH 26.1 (L) 27.0 - 33.0 pg    MCHC 29.5 (L) 32.3 - 36.5 g/dL    RDW 55.1 (H) 35.9 - 50.0 fL    Platelet Count 255 164 - 446 K/uL    MPV 10.1 9.0 - 12.9 fL   Comp Metabolic Panel    Collection Time: 12/26/23  6:02 AM   Result Value Ref Range    Sodium 127 (L) 135 - 145 mmol/L    Potassium 5.9 (H) 3.6 - 5.5 mmol/L    Chloride 96 96 - 112 mmol/L    Co2 18 (L) 20 - 33 mmol/L    Anion Gap 13.0 7.0 - 16.0    Glucose 89 65 - 99 mg/dL    Bun 42 (H) 8 - 22 mg/dL    Creatinine 2.10 (H) 0.50 - 1.40 mg/dL    Calcium 7.9 (L) 8.5 - 10.5 mg/dL    Correct Calcium 8.9  8.5 - 10.5 mg/dL    AST(SGOT) 31 12 - 45 U/L    ALT(SGPT) 16 2 - 50 U/L    Alkaline Phosphatase 143 (H) 30 - 99 U/L    Total Bilirubin 1.5 0.1 - 1.5 mg/dL    Albumin 2.8 (L) 3.2 - 4.9 g/dL    Total Protein 6.6 6.0 - 8.2 g/dL    Globulin 3.8 (H) 1.9 - 3.5 g/dL    A-G Ratio 0.7 g/dL   ESTIMATED GFR    Collection Time: 12/26/23  6:02 AM   Result Value Ref Range    GFR (CKD-EPI) 38 (A) >60 mL/min/1.73 m 2       Medications:  Scheduled Medications   Medication Dose Frequency    sodium zirconium cyclosilicate  10 g TWICE DAILY    ipratropium-albuterol  3 mL TID    linezolid  600 mg Q12HRS    morphine  30 mg Q4HRS    vitamin D3  2,000 Units DAILY    Pharmacy Consult Request  1 Each PHARMACY TO DOSE    acetaminophen  1,000 mg TID    therapeutic multivitamin-minerals  1 Tablet DAILY WITH LUNCH    apixaban  5 mg BID    cefepime  2 g Q8HRS    dapagliflozin propanediol  10 mg DAILY    DULoxetine  60 mg DAILY    lactulose  30 mL TID    miconazole   BID    morphine ER  15 mg Q12HRS    omeprazole  20 mg DAILY    senna-docusate  2 Tablet BID    normal saline (PF)  20 mL Q12HRS     PRN medications: oxyCODONE immediate-release, Respiratory Therapy Consult, acetaminophen **FOLLOWED BY** [START ON 12/27/2023] acetaminophen, hydrALAZINE, acetaminophen, carboxymethylcellulose, benzocaine-menthol, mag hydrox-al hydrox-simeth, ondansetron **OR** ondansetron, traZODone, sodium chloride, senna-docusate **AND** polyethylene glycol/lytes **AND** magnesium hydroxide **AND** bisacodyl, albuterol    Diet:  Current Diet Order   Procedures    Diet Order Diet: Regular       Medical Decision Making and Plan:  Adapted from Dr. Khan's A&P  Left residual limb infection and osteomyelitis requiring BKA revision:  -Continue comprehensive acute inpatient rehabilitation  -PT and OT for mobility and ADLs. Per guidelines, 15 hours per week between PT, OT.  -Follow-up orthopedic surgery  -Nonweightbearing left lower extremity  -Dressing changes daily         Multiple right lower extremity surgical interventions: open reduction of the right subtalar joint dislocation, right talonavicular joint dislocation right foot triple arthrodesis, right extensor tendon lengthening and application of multiplanar external fixator on the right lower extremity on 12/6.   -Right lower extremity external fixator, pain management as per nursing protocol  - Expected removal of Ex-Fix in 8 to 12 weeks  -Toe-touch weightbearing on right lower extremity for transfers only     CV: CHF, severe aortic stenosis, moderate aortic insufficiency, moderate to severe tricuspid regurg and RSVP of 66  -Cardiology was consulted and recommending TAVR in the next month.  -Will hold beta-blocker/ACE/ARB given hypotension  -Follow-up with cardiology as outpatient  -Transition to regular diet secondary to poor intake of cardiovascular diet     Rheumatoid arthritis: on Humira at home  - discussed with patient about bringing home medication and regular administration.     stump abscess/osteomyelitis complicated with MSSA bacteremia: Previous abscess and osteomyelitis 10/2023, revision as above 12/16  - Cefepime 2 g IV every 8 hours  -Transition to ertapenem on discharge until 1/23  -CBC with differential and CMP weekly  - Follow-up with ID as outpatient     Chronic pain: Severe pain overnight and this morning limiting her participation with therapy  -MS Contin 15 mg every 12 hours  - Transition to home dose morphine IR 30 mg 4 hours scheduled  -Oxycodone 10 mg every 4 hours as needed severe pain  - Cymbalta 60 mg daily     Postoperative pain:  - Tylenol 1000 mg 3 times daily, given high-dose Tylenol will check LFTs in a.m.  - Morphine IR 30 mg every 4 hours scheduled -> reduce to 15 as too sedated  - Oxycodone 10 mg every 4 hours as needed severe pain, discussed risks of respiratory suppression     Leukocytosis: WBC of 12.2 on 12/22 -> 10.1 on 12/23  -Check CBC in a.m. 10-.9, continue to monitor on IV  antibiotics     Hyponatremia: Sodium of 129 on 12/23 -> sodium of 126 on 12/25  -Start patient on sodium chloride tablets 1 g 3 times daily  -Check CMP in a.m. as per hospitalist  - Consult hospitalist, appreciate input     Hyperkalemia: Potassium of 6.2  - Consulted hospitalist, discussed with Dr. Collins  - Rechecking potassium at 3 PM     Acute kidney injury: Creatinine of 1.4 on 12/25  - Check CMP in a.m.  - AM labs 2.1      Respiratory failure with hypoxia  -Consult respiratory therapy  -Start on DuoNeb 3 times daily and hypertonic saline as mucolytic 3 times daily.  Discussed Aerobika  - Supplemental oxygen as per guidelines, currently on supplemental oxygen of 4 L via nasal cannula/OxyMask  - Consult hospitalist discussed with Dr. 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 intake, will transition to regular diet     GI Ppx - Patient on Prilosec for GERD prophylaxis. Patient on Senna-docusate for constipation prophylaxis.      DVT Ppx - Patient apixaban 5 mg every 12 hours on transfer.  ____________________________________    T. Loi Pisano MD/PhD  [facility] - Physical Medicine & Rehabilitation   [facility]   ____________________________________    Total time:  50 minutes. Time spent included pre-rounding review of vitals and tests, unit/floor time, face-to-face time with the patient including physical examination, care coordination, counseling of patient and/or family, ordering medications/procedures/tests, discussion with CM, PT, OT, SLP and/or other healthcare providers, and documentation in the electronic medical record. Topics discussed included discharge planning, worsening KATERYNA, sedation, and reduce pain medications. Patient was discussed separately in IDT today;  please see details above.

## 2023-12-26 NOTE — ASSESSMENT & PLAN NOTE
Hb: 7.7  Fe: 21, sats 8% -- no ferritin ordered -- don't know if it's ACD  Will get ferritin levels before starting supplements

## 2023-12-26 NOTE — THERAPY
Missed Therapy    Patient Name: Richard Hubbard II  Age:  48 y.o., Sex:  male  Medical Record #: [id number]  Today's Date: 12/26/2023    Discussed missed therapy with RN, MD- transfer acute, D/C         12/26/23 1331   Therapy Missed   Missed Therapy (Minutes) 60   Reason For Missed Therapy Medical - Patient on Hold from Therapy  (Transfer acute)   Roll Left and Right   Assistance Needed Physical assistance   Physical Assistance Level Total assistance   CARE Score - Roll Left and Right 1   Sit to Lying   Assistance Needed Physical assistance   Physical Assistance Level Total assistance   CARE Score - Sit to Lying 1   Lying to Sitting on Side of Bed   Assistance Needed Physical assistance   Physical Assistance Level Total assistance   CARE Score - Lying to Sitting on Side of Bed 1   Sit to Stand   Reason if not Attempted Activity not applicable   CARE Score - Sit to Stand 9   Chair/Bed-to-Chair Transfer   Assistance Needed Physical assistance   Physical Assistance Level Total assistance  (Jia)   CARE Score - Chair/Bed-to-Chair Transfer 1   Car Transfer   Reason if not Attempted Medical concerns   CARE Score - Car Transfer 88   Walk 10 Feet   Reason if not Attempted Activity not applicable   CARE Score - Walk 10 Feet 9   Walk 50 Feet with Two Turns   Reason if not Attempted Activity not applicable   CARE Score - Walk 50 Feet with Two Turns 9   Walk 150 Feet   Reason if not Attempted Activity not applicable   CARE Score - Walk 150 Feet 9   Walking 10 Feet on Uneven Surfaces   Reason if not Attempted Activity not applicable   CARE Score - Walking 10 Feet on Uneven Surfaces 9   1 Step (Curb)   Reason if not Attempted Activity not applicable   CARE Score - 1 Step (Curb) 9   4 Steps   Reason if not Attempted Activity not applicable   CARE Score - 4 Steps 9   12 Steps   Reason if not Attempted Activity not applicable   CARE Score - 12 Steps 9   Picking Up Object   Reason if not Attempted Activity not applicable   CARE  Score - Picking Up Object 9   Wheel 50 Feet with Two Turns   Reason if not Attempted Medical concerns   CARE Score - Wheel 50 Feet with Two Turns 88   Type of Wheelchair/Scooter Manual   Wheel 150 Feet   Reason if not Attempted Medical concerns   CARE Score - Wheel 150 Feet 88   Type of Wheelchair/Scooter Manual   P.T. Discharge Summary   Discharge Location [facility]   Patient Discharging with Assist of Paid Caregiver   Level of Supervision Required Upon Discharge Twenty Four Hour Supervision   Long Term Goals Met None- See POC   Long Term Goals Not Met All- See POC   Reason(s) for Goals Not Met Transfer acute   Criteria for Termination of Services Patient Transferred to Acute [facility] for Medical Purposes

## 2023-12-26 NOTE — FLOWSHEET NOTE
12/26/23 0747   Events/Summary/Plan   Events/Summary/Plan SVN and Flutter, Found Pt with oxymask below his chin, will place on nasal cannula for breakfast and therapy.   Vital Signs   Pulse 88   Respiration 20   Pulse Oximetry 94 %   $ Pulse Oximetry (Spot Check) Yes   Respiratory Assessment   Respiratory Pattern Within Normal Limits   Level of Consciousness Responds to voice   Chest Exam   Work Of Breathing / Effort Moderate   Breath Sounds   RUL Breath Sounds Clear   RML Breath Sounds Crackles   RLL Breath Sounds Crackles   DONALD Breath Sounds Clear   LLL Breath Sounds Crackles   Secretions   Cough Strong;Non Productive   Oxygen   O2 (LPM) 4   O2 Delivery Device Nasal Cannula

## 2023-12-26 NOTE — DISCHARGE PLANNING
CASE MANAGEMENT INITIAL ASSESSMENT    Admit Date:  12/22/2023     I met with patient to discuss role of case management / discharge planning / team conference.   Patient is a  48 y.o. male transferred from [facility], [place].    Diagnosis: Unilateral complete BKA, left, initial encounter ([facility]) [S88.112A]    Co-morbidities:   Patient Active Problem List    Diagnosis Date Noted    Borderline abnormal TFTs 12/26/2023    Respiratory failure ([facility]) 12/26/2023    KATERYNA (acute kidney injury) ([facility]) 12/26/2023    Hyperkalemia 12/26/2023    Unilateral complete BKA, left, initial encounter ([facility]) 12/22/2023    Hypotension 12/20/2023    Pulmonary hypertension ([facility]) 12/10/2023    Cardiogenic shock ([facility]) 12/09/2023    Elevated LFTs 12/09/2023    Immunosuppression ([facility]) 12/07/2023    Dislocation of right subtalar joint 12/06/2023    Dislocation of right subtalar joint, initial encounter 12/06/2023    BKA stump complication ([facility]) 10/27/2023    Class 1 obesity due to excess calories without serious comorbidity with body mass index (BMI) of 32.0 to 32.9 in adult 10/21/2023    Bacteremia due to Staphylococcus aureus 10/20/2023    Abscess of left lower extremity 10/20/2023    Somnolence 10/19/2023    Murmur, cardiac 10/19/2023    Acquired planovalgus deformity of right foot 10/19/2023    Right foot pain 10/10/2023    Acute right ankle pain 10/10/2023    Blood glucose elevated 09/04/2023    Cough 09/01/2023    Cardiac volume overload 08/29/2023    Anemia 08/24/2023    Hx of gastric bypass 08/21/2023    Chronic diastolic heart failure with preserved ejection fraction ([facility]) 08/21/2023    CHF (congestive heart failure), NYHA class I, acute on chronic, combined ([facility]) 08/20/2023    Hypokalemia 08/14/2023    Acute combined systolic and diastolic heart failure ([facility]) 08/12/2023    Cardiorenal syndrome 08/12/2023    Elevated liver function tests 08/12/2023    Mitral valve mass 08/12/2023    Left bundle branch block 08/12/2023    Acute kidney injury ([facility])  08/12/2023    Obesity (BMI 30-39.9) 05/31/2023    Umbilical hernia without obstruction and without gangrene 05/31/2023    Normocytic anemia 05/31/2023    S/P BKA (below knee amputation), left (HCC) 03/21/2023    Vitamin D deficiency 03/14/2023    MRSA bacteremia 03/08/2023    Tenosynovitis of left lower leg 03/06/2023    Encephalopathy 02/28/2023    Hyponatremia 02/28/2023    Generalized weakness 06/15/2022    Impaired mobility and ADLs 06/15/2022    Right foot ulcer (HCC) 06/15/2022    Chronic use of opiate drug for therapeutic purpose 01/18/2021    Localized osteoporosis without current pathological fracture 10/19/2020    History of osteomyelitis 08/14/2020    History of immunosuppressive therapy 08/14/2020    History of atrial fibrillation ( single episode 2019) 07/23/2020    History of cigarette smoking 07/21/2020    Atrial fibrillation with RVR ([facility]) 11/05/2019    Sepsis ([facility]) 10/02/2019    Aortic root dilatation ([facility]) 09/13/2019    Aortic stenosis due to bicuspid aortic valve 09/12/2019    Rheumatoid arthritis ([facility]) 09/08/2015    Chronic, continuous use of opioids 09/08/2015    Chronic pain of right knee 09/08/2015    History of bleeding ulcers 02/12/2015    MONICA (obstructive sleep apnea) 03/09/2010    Mood disorder ([facility]) 05/21/2009    ELISEO (generalized anxiety disorder) 05/21/2009    Insomnia 05/21/2009     Prior Living Situation:  Housing / Facility: 1 Story House  Lives with - Patient's Self Care Capacity: Child Less than 18 Years of Age, Spouse    Prior Level of Function:  Medication Management: Independent  Finances: Independent  Home Management: Independent  Shopping: Independent  Prior Level Of Mobility: Independent With Device in Community, Independent With Device in Home  Driving / Transportation: Relatives / Others Provide Transportation    Support Systems:  Primary : Katiana-spouse: [phone] or gatito-Priscila: [phone].       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 Continue to follow patient through hospitalization and provide discharge planning in collaboration with patient, family, physicians and ancillary services.     2. Utilize community resources to ensure a safe discharge.

## 2023-12-26 NOTE — ASSESSMENT & PLAN NOTE
2nd to recurrent infection  S/P 2nd BKA revision on 12/16/23  Cont Cefepime (thru 1/23/24) per ID  On Zyvox (thru 12/30) per Dr. Collins  PCT has been improving  Will check U/A

## 2023-12-26 NOTE — CARE PLAN
Problem: Bathing  Goal: STG-Within one week, patient will bathe with Min A.  Outcome: Not Met  Note: Requires Mod A     Problem: Dressing  Goal: STG-Within one week, patient will dress UB with SBA.  Outcome: Not Met  Note: Requires Min A  Goal: STG-Within one week, patient will dress LB with Max/Mod A.  Outcome: Not Met  Note: Requires Total A     Problem: Functional Transfers  Goal: STG-Within one week, patient will transfer to toilet with Max A.  Outcome: Not Met  Note: Unable to participate

## 2023-12-26 NOTE — CARE PLAN
Problem: Mobility Transfers  Goal: STG-Within one week, patient will transfer bed to chair with slide board mod A x 1  Outcome: Not Met  Note:  4-person lateral transfer board from bed to bed, consider Jia    Goal: STG-Within one week, patient will move supine to sit mod A  Outcome: Not Met  Note: Total A x2, pain, SOB, WB restrictions

## 2023-12-26 NOTE — THERAPY
Physical Therapy   Daily Treatment     Patient Name: Richard Hubbard II  Age:  48 y.o., Sex:  male  Medical Record #: [id number]  Today's Date: 12/26/2023     Precautions  Precautions: Fall Risk, Non Weight Bearing Right Lower Extremity, Non Weight Bearing Left Lower Extremity  Comments: RLE ex fix, 4-5 L O2    Subjective    Patient in bed, lethargic and appearing malaised.     Objective       12/26/23 0831   PT Charge Group   PT Therapeutic Activities (Units) 2   PT Total Time Spent   PT Individual Total Time Spent (Mins) 30   Precautions   Precautions Fall Risk;Non Weight Bearing Right Lower Extremity;Non Weight Bearing Left Lower Extremity   Comments RLE ex fix, 4-5 L O2   Supine Lower Body Exercise   Other Exercises LLE SLR x15 during residual limb wrapping with cues for performance   Interdisciplinary Plan of Care Collaboration   IDT Collaboration with  Nursing;Respiratory Therapist   Patient Position at End of Therapy In Bed;Call Light within Reach;Tray Table within Reach;Phone within Reach     Discussion with nursing and respiratory therapist regarding patient status. Patient with significant drainage on bed sheets around RLE, as well as difficulty answering questions including orientation questions.    Residual limb wrapping performed on LLE, with patient performing several SLR to assist with process.    Assessment    Patient tolerated session poorly, very lethargic and unable to participate in conversations. Able to follow instructions with multiple prompts for lifting leg to assist with residual limb wrapping.    Strengths: Supportive family  Barriers: Decreased endurance, Fatigue, Generalized weakness, Impaired activity tolerance, Impaired balance, Limited mobility, Pain, Pain poorly managed (WB precautions)    Plan    Trial EOB balance, assess slide board transfer (caution: skin is compromised), bed mobility, therapeutic exercise.     DME  PT DME Recommendations  Wheelchair:  (has wc)  Cushion:  "Specialty (See other comments) (has standard cushion, but skin is currently compromised, recommend specialty cushion for pressure relief)  Additional Equipment: Transfer Board (30\" Length)     Passport items to be completed:  Get in/out of bed safely, in/out of a vehicle, safely use/maintain mobility device, navigate around home/community, show how to get up/down from the ground, demonstrate pressure relief/ perform skin check, ensure home is accessible, demonstrate HEP, complete caregiver training    Physical Therapy Problems (Active)       Problem: Mobility       Dates: Start:  12/23/23         Goal: STG-Within one week, patient will propel wheelchair household distances >50 ft indoors x 2 SBA       Dates: Start:  12/23/23               Problem: Mobility Transfers       Dates: Start:  12/23/23         Goal: STG-Within one week, patient will transfer bed to chair with slide board mod A x 1       Dates: Start:  12/23/23            Goal: STG-Within one week, patient will move supine to sit mod A       Dates: Start:  12/23/23               Problem: PT-Long Term Goals       Dates: Start:  12/23/23         Goal: LTG-By discharge, patient will propel wheelchair mod I >150 ft over various surfaces, including ramp to enter/ exit home        Dates: Start:  12/23/23            Goal: LTG-By discharge, patient will transfer one surface to another mod I slide board wc <> bed, while maintaining WB precautions       Dates: Start:  12/23/23              "

## 2023-12-26 NOTE — ASSESSMENT & PLAN NOTE
Echo: EF 45, RVSP 66, LAE, severe AS, severe MR  Plan for TAVR next month per Cardiolgy consultation at [facility]

## 2023-12-26 NOTE — FLOWSHEET NOTE
12/25/23 1904   Events/Summary/Plan   Events/Summary/Plan SVN and flutter   Vital Signs   Pulse 88  (post 86)   Respiration 20  (post 20)   Pulse Oximetry 95 %   $ Pulse Oximetry (Spot Check) Yes   Respiratory Assessment   Level of Consciousness Alert   Chest Exam   Work Of Breathing / Effort Within Normal Limits;Mild   Breath Sounds   RUL Breath Sounds Diminished  (increased aeration)   RML Breath Sounds Diminished   RLL Breath Sounds Diminished   DONALD Breath Sounds Diminished   LLL Breath Sounds Diminished   Oxygen   O2 (LPM) 4   O2 Delivery Device Oxymask

## 2023-12-26 NOTE — PROGRESS NOTES
Upon AM assessment 0845 pt AAOx4 but forgetful. Noted to be diaphoretic and tachypneic to mid 20s. Vitals otherwise stable. Coughing extensively with PO intake. RN made pt NPO. Labs notable for K 5.9, WBC slightly increased to 10.9. MD Pisano notified, states he will round shortly.     Therapies attempting to see pt, rounding with RN agreeing pt not appropriate to see 2/2 diaphoresis and general malaise. Vitals still stable. Notified MD Pisano again requesting labs and imaging. MD stated to notify hospitalist. MD Rivera notified 1215, read receipt, no response. Rapid called to get MD team to bedside. Quick assessment, MD decision to send to ED. Report given to EMS.

## 2023-12-26 NOTE — PROGRESS NOTES
Hospital Medicine Daily Progress Note      Chief Complaint  Anemia  Hyponatremia  Abnormal Thyroid Studies    Interval Problem Update  Pt reports SOB a bit better today.  Lab and imaging results reviewed and discussed.    Review of Systems  Review of Systems   Constitutional:  Negative for chills and fever.   HENT: Negative.     Eyes: Negative.    Respiratory:  Positive for shortness of breath. Negative for cough and hemoptysis.    Cardiovascular:  Negative for chest pain and palpitations.   Gastrointestinal:  Negative for abdominal pain, nausea and vomiting.   Musculoskeletal:         Wound pain   Skin:  Negative for itching and rash.   Endo/Heme/Allergies:  Negative for polydipsia. Does not bruise/bleed easily.        Physical Exam  Temp:  [36.2 °C (97.1 °F)-36.7 °C (98 °F)] 36.2 °C (97.1 °F)  Pulse:  [84-90] 88  Resp:  [17-20] 20  BP: (105-112)/(54-60) 105/60  SpO2:  [93 %-99 %] 95 %    Physical Exam  Vitals reviewed.   Constitutional:       Comments: Chronically ill appearing   HENT:      Head: Normocephalic and atraumatic.      Right Ear: External ear normal.      Left Ear: External ear normal.      Nose: Nose normal.      Mouth/Throat:      Pharynx: Oropharynx is clear.   Eyes:      General:         Right eye: No discharge.         Left eye: No discharge.      Extraocular Movements: Extraocular movements intact.      Conjunctiva/sclera: Conjunctivae normal.   Cardiovascular:      Rate and Rhythm: Normal rate and regular rhythm.   Pulmonary:      Effort: No respiratory distress.      Breath sounds: No wheezing or rales.   Abdominal:      General: Bowel sounds are normal. There is no distension.      Palpations: Abdomen is soft.      Tenderness: There is no abdominal tenderness. There is no guarding.   Musculoskeletal:      Cervical back: Normal range of motion and neck supple.      Right lower leg: Edema present.      Comments: L BKA wound photos reviewed  RLE external fixator   Skin:     General: Skin is warm  and dry.   Neurological:      Mental Status: He is oriented to person, place, and time.         Fluids      Laboratory      Imaging    Assessment/Plan  Azotemia  Assessment & Plan  Will change Aldactone to Lasix given uptrending K+  Check F/U labs in AM    Respiratory failure (HCC)  Assessment & Plan  CXR worsening severe bilat diffuse airspace disease, small right eff  Will add Zyvox to Cefepime given h/o +MRSA PCR    Borderline abnormal TFTs  Assessment & Plan  TSH 8.39 and FT4 1.33  Suspect subclinical hypothyroid  Outpt F/U    BKA stump complication (HCC)- (present on admission)  Assessment & Plan  Recurrent infxn S/P 2nd BKA revision on 12/16/23 by Dr. Olivares  On Cefepime through 1/23/24 per ID  WBC and PCT both improving  Wound care and pain control per Physiatry    Acquired planovalgus deformity of right foot- (present on admission)  Assessment & Plan  S/P scheduled ORIF on 12/6/23 by Dr. Pryor  Wound care and pain control per Physiatry    Anemia- (present on admission)  Assessment & Plan  Has normocytic indices  Fe 21, may start supplement if no contraindications  Follow H/H    Hx of gastric bypass- (present on admission)  Assessment & Plan  History noted    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)- (present on admission)  Assessment & Plan  Echo EF 45%, RVSP 66 mmHg, LAE, severe AS/MR  On Farxiga and Aldactone    Vitamin D deficiency- (present on admission)  Assessment & Plan  Vit D level 17  Continue supplementation    Hyponatremia- (present on admission)  Assessment & Plan  Increase NaCl  Check F/U labs in AM    Chronic use of opiate drug for therapeutic purpose- (present on admission)  Assessment & Plan  On MS Contin  Pain management per Physiatry    Atrial fibrillation with RVR (HCC)- (present on admission)  Assessment & Plan  Echo EF 45%, RVSP 66 mmHg, LAE, severe AS/MR  On no rate controlling agents  Monitor for tachydysrhythmias  Anticoagulated on Eliquis    Aortic stenosis due  to bicuspid aortic valve- (present on admission)  Assessment & Plan  Echo EF 45, RVSP 66, lae, severe as, severe mr  Plan for TAVR next month per Cardiolgy consultation at [facility]    Rheumatoid arthritis (HCC)- (present on admission)  Assessment & Plan  On Humira as outpt  Rheumatology F/U    Full Code

## 2023-12-26 NOTE — CONSULTS
Critical Care Consultation    Date of consult: 12/26/2023    Referring Physician  Bartolome Gomes M.D.    Reason for Consultation  Acute encephalopathy, renal failure    History of Presenting Illness  48 y.o. male who presented 12/26/2023 with encephalopathy.  He has an extremely complex PMH and has been hospitalized or in acute rehab almost continuously for the last year.  His underlying medical problems are severe RA on humira, heart failure both from valvular disease (bicuspid AoV with AS and AI) and reduced EF, afib, MONICA, chronic pain on opiates, gastric bypass. His recent admissions are as follow:     Spring 2023 L leg osteo requiring BKA.   July 2023: admitted at [facility] for RLE wounds and CHF  August 2023: Admission for heart failure, cardiorenal, abnormal LFTs that all improved. October 2023: Infection of the L BKA stump requiring revision, MSSA bacteremia treated with 6 weeks cefazolin.   12/6/23: Admitted for multiple orthopedic surgeries to the E for deformities related to his RA. Developed afib with RVR and cardiogenic shock. Also underwent another revision of the LLE BKA for wounds and was growing serratia marcescens.  He was discharged on 12/22 to inpatient rehab on cefepime and linezolid, but plans to switch to ertapenem when he was sent home from rehab.  He also saw cardiology and they planned on a TAVR about 1 month after discharge pending ID clearance.  Of note, he had a CT for TAVR workup on the day of discharge which was notable for probable PE as well as peripheral opacities concerning for septic pulmonary emboli vs pulmonary infarcts.  He also has diffuse GGO.     Today he was sent to the ER from rehab for altered mental status.  In the ER I introduced myself.  He then perseverated and repeat my name and his name, but was unable to answer other questions.  He is found to have KATERYNA and mild hyperkalemia. He is on 5L nc.     Code Status  Prior    Review of Systems  Review of Systems   Unable  to perform ROS: Mental status change       Past Medical History   has a past medical history of ADD (attention deficit disorder), Alcohol abuse, Allergic rhinitis (05/21/2009), Anemia (09/2019), Anxiety (05/21/2009), Apnea, sleep, Arrhythmia, Back pain (05/21/2009), Bipolar disorder ([facility]), Bleeding ulcer (05/21/2009), Bleeding ulcer (05/21/2009), Bowel habit changes, Breath shortness, Congenital anomaly of mitral valve, Congestive heart failure ([facility]), Daytime sleepiness, Dental disorder, Depression (05/21/2009), Eczema (05/21/2009), GERD (gastroesophageal reflux disease), Heart burn, Heart murmur, Hemorrhagic disorder ([facility]), History of bleeding ulcers (02/12/2015), Hypertension (05/03/2021), Insomnia (05/21/2009), Intestinal hernia, Migraine (05/21/2009), Morning headache, Obesity, morbid ([facility]) (05/21/2009), Osteomyelitis ([facility]) (10/07/2019), Pain (09/2019), Pneumonia, Pubic ramus fracture ([facility]) (09/28/2019), Rheumatoid arteritis ([facility]) (09/2019), Sleep apnea (05/21/2009), and Snoring.    He has no past medical history of Hyperlipidemia.    Surgical History   has a past surgical history that includes gastric bypass laparoscopic (2000); irrigation & debridement ortho (Left, 10/09/2019); other surgical procedure; other surgical procedure (2019); pr total knee arthroplasty (Right, 07/22/2020); cholecystectomy (2000); tendon transfer (Right, 01/22/2020); other orthopedic surgery (Right, 2020); knee amputation below (Left, 03/09/2023); irrigation & debridement general (Left, 10/19/2023); incision and drainage orthopedic (Left, 10/22/2023); orif, ankle (Right, 12/6/2023); and knee amputation below (Left, 12/16/2023).    Family History  family history includes Arthritis in his mother; Cancer in his father and maternal grandmother; Depression in his mother; Heart Disease in his maternal grandmother; Hypertension in his mother; Psychiatric Illness in an other family member; Schizophrenia in his father; Stroke in his maternal  aunt.    Social History   reports that he has been smoking cigarettes and cigars. He has been exposed to tobacco smoke. He has never used smokeless tobacco. He reports that he does not drink alcohol and does not use drugs.    Medications  Home Medications    **Home medications have not yet been reviewed for this encounter**       No current facility-administered medications for this encounter.     Current Outpatient Medications   Medication Sig Dispense Refill    spironolactone (ALDACTONE) 25 MG Tab Take 1 Tablet by mouth every day. 30 Tablet 3    senna-docusate (PERICOLACE OR SENOKOT S) 8.6-50 MG Tab Take 2 Tablets by mouth 2 times a day. 30 Tablet 0    polyethylene glycol/lytes (MIRALAX) Pack Take 1 Packet by mouth 1 time a day as needed (if sennosides and docusate ineffective after 24 hours).  3    dronabinol (MARINOL) 2.5 MG Cap Take 1 Capsule by mouth 2 times a day as needed (appetite) for up to 10 days. 20 Capsule 0    apixaban (ELIQUIS) 5mg Tab Take 1 Tablet by mouth 2 times a day. Indications: Thromboembolism secondary to Atrial Fibrillation 60 Tablet     MORPHINE SULFATE ER PO Take 15 mg by mouth 2 times a day.      omeprazole (PRILOSEC) 20 MG delayed-release capsule Take 1 Capsule by mouth every day. 30 Capsule 2    DULoxetine (CYMBALTA) 60 MG Cap DR Particles delayed-release capsule Take 1 Capsule by mouth every day. 30 Capsule 2    dapagliflozin propanediol (FARXIGA) 10 MG Tab Take 1 Tablet by mouth every day for 90 days. 30 Tablet 2    morphine (MS IR) 30 MG tablet Take 30 mg by mouth 4 times a day.      MOVANTIK 25 MG Tab Take 25 mg by mouth every day.      multivitamin Tab Take 1 Tablet by mouth every day.      vitamin D3 (CHOLECALCIFEROL) 1000 Unit (25 mcg) Tab Take 1 Tablet by mouth every day. 30 Tablet 2    acetaminophen (TYLENOL) 500 MG Tab Take 2 Tablets by mouth in the morning, at noon, and at bedtime. 180 Tablet 2    albuterol 108 (90 Base) MCG/ACT Aero Soln inhalation aerosol Inhale 2 Puffs  every 6 hours as needed for Shortness of Breath. 8.5 g 0    Naloxone (NARCAN) 4 MG/0.1ML Liquid One spray in one nostril for overdose and call 911. (Patient taking differently: Administer 1 Spray into affected nostril(S) one time. One spray in one nostril for overdose and call 911.) 1 Package 0     Facility-Administered Medications Ordered in Other Encounters   Medication Dose Route Frequency Provider Last Rate Last Admin    [MAR Hold - Suspended Admission] sodium zirconium cyclosilicate (Lokelma) packet 10 g  10 g Oral TWICE DAILY Noe Rivera M.D.        [MAR Hold - Suspended Admission] morphine (MS IR) tablet 15 mg  15 mg Oral Q4HRS Keaton Pisano M.D.        [MAR Hold - Suspended Admission] ipratropium-albuterol (DUONEB) nebulizer solution  3 mL Nebulization TID GINA Sykes.OMary   3 mL at 12/26/23 0747    [MAR Hold - Suspended Admission] linezolid (Zyvox) tablet 600 mg  600 mg Oral Q12HRS Iris Collins M.D.   600 mg at 12/26/23 0836    [MAR Hold - Suspended Admission] oxyCODONE immediate release (Roxicodone) tablet 10 mg  10 mg Oral Q4HRS PRN Alexx Khan D.O.        [MAR Hold - Suspended Admission] vitamin D3 (Cholecalciferol) tablet 2,000 Units  2,000 Units Oral DAILY Iris Collins M.D.   2,000 Units at 12/26/23 0835    [MAR Hold - Suspended Admission] Respiratory Therapy Consult   Nebulization Continuous RT GINA Sykes.O.        [MAR Hold - Suspended Admission] Pharmacy Consult Request ...Pain Management Review 1 Each  1 Each Other PHARMACY TO DOSE GINA Sykes.O.        [MAR Hold - Suspended Admission] acetaminophen (Tylenol) tablet 1,000 mg  1,000 mg Oral TID GINA Sykes.O.   1,000 mg at 12/26/23 0836    Followed by    [MAR Hold - Suspended Admission] acetaminophen (Tylenol) tablet 1,000 mg  1,000 mg Oral Q6HRS PRN GINA Sykes.O.        [MAR Hold - Suspended Admission] hydrALAZINE (Apresoline) tablet 25 mg  25 mg Oral Q8HRS PRN Alexx Khan D.O.        [MAR  Hold - Suspended Admission] acetaminophen (Tylenol) tablet 650 mg  650 mg Oral Q4HRS PRN Alexx Khan, D.O.        [MAR Hold - Suspended Admission] carboxymethylcellulose (Refresh Tears) 0.5 % ophthalmic drops 1 Drop  1 Drop Both Eyes PRN Alexx Khan D.O.        [MAR Hold - Suspended Admission] benzocaine-menthol (Cepacol) lozenge 1 Lozenge  1 Lozenge Mouth/Throat Q2HRS PRN Alexx Khan D.O.        [MAR Hold - Suspended Admission] mag hydrox-al hydrox-simeth (Maalox Plus Es Or Mylanta Ds) suspension 20 mL  20 mL Oral Q2HRS PRN Alexx Khan, D.O.        [MAR Hold - Suspended Admission] ondansetron (Zofran ODT) dispertab 4 mg  4 mg Oral 4X/DAY PRN Alexx Khan D.O.   4 mg at 12/23/23 0849    Or    [MAR Hold - Suspended Admission] ondansetron (Zofran) syringe/vial injection 4 mg  4 mg Intramuscular 4X/DAY PRN Alexx Khan, D.O.        [MAR Hold - Suspended Admission] traZODone (Desyrel) tablet 50 mg  50 mg Oral QHS PRN Alexx Khan, D.O.        [MAR Hold - Suspended Admission] sodium chloride (Ocean) 0.65 % nasal spray 2 Spray  2 Spray Nasal PRN Alexx Khan D.O.        [MAR Hold - Suspended Admission] therapeutic multivitamin-minerals (Theragran-M) tablet 1 Tablet  1 Tablet Oral DAILY WITH LUNCH Alexx Khan D.O.   1 Tablet at 12/25/23 1059    [MAR Hold - Suspended Admission] apixaban (Eliquis) tablet 5 mg  5 mg Oral BID Alexx Khan D.O.   5 mg at 12/26/23 0836    [MAR Hold - Suspended Admission] cefepime (Maxipime) 2 g in  mL IVPB  2 g Intravenous Q8HRS Alexx Khan D.O.   Stopped at 12/26/23 0552    [MAR Hold - Suspended Admission] dapagliflozin propanediol (Farxiga) tablet 10 mg  10 mg Oral DAILY Alexx Khan D.O.   10 mg at 12/26/23 0836    [MAR Hold - Suspended Admission] DULoxetine (Cymbalta) capsule 60 mg  60 mg Oral DAILY Alexx Khan D.OMary   60 mg at 12/26/23 0836    [MAR Hold - Suspended Admission] lactulose 20 GM/30ML solution 30 mL  30 mL  "Oral TID Alexx Khan D.O.   30 mL at 12/26/23 0837    [MAR Hold - Suspended Admission] miconazole (Micotin) 2 % cream   Topical BID GINA Sykes.O.   Given at 12/25/23 2100    [MAR Hold - Suspended Admission] morphine ER (Ms Contin) tablet 15 mg  15 mg Oral Q12HRS Alexx Khan D.O.   15 mg at 12/25/23 2206    [MAR Hold - Suspended Admission] omeprazole (PriLOSEC) capsule 20 mg  20 mg Oral DAILY Alexx Khan D.O.   20 mg at 12/26/23 0836    [MAR Hold - Suspended Admission] senna-docusate (Pericolace Or Senokot S) 8.6-50 MG per tablet 2 Tablet  2 Tablet Oral BID Alexx Khan D.O.   2 Tablet at 12/26/23 0836    And    [MAR Hold - Suspended Admission] polyethylene glycol/lytes (Miralax) Packet 1 Packet  1 Packet Oral QDAY PRN Alexx Khan D.O.        And    [MAR Hold - Suspended Admission] magnesium hydroxide (Milk Of Magnesia) suspension 30 mL  30 mL Oral QDAY PRN Alexx Khan D.O.   30 mL at 12/26/23 0529    And    [MAR Hold - Suspended Admission] bisacodyl (Dulcolax) suppository 10 mg  10 mg Rectal QDAY PRN Alexx Khan D.O.        [MAR Hold - Suspended Admission] albuterol (Proventil) 2.5mg/3ml nebulizer solution 2.5 mg  2.5 mg Nebulization Q4H PRN (RT) Alexx Khan D.O.   2.5 mg at 12/24/23 0639    [MAR Hold - Suspended Admission] normal saline (PF) 20 mL  20 mL Intravenous Q12HRS Keaton Pisano M.D.   20 mL at 12/26/23 1150       Allergies  Allergies   Allergen Reactions    Nsaids      Bleeding, \"I had a bleeding ulcer\"    Diphenhydramine Unspecified     \"I get agitated\"    Mirtazapine Unspecified     I had a hard time waking up, lacking mental focus.     Penicillins      \"Had some dizziness\"  Tolerated Unasyn 10/2019  Tolerated Zosyn 02/2023    Promethazine Unspecified     \"I get very agitated\"       Vital Signs last 24 hours  Temp:  [36.7 °C (98 °F)] 36.7 °C (98 °F)  Pulse:  [82-92] 88  Resp:  [14-22] 16  BP: ()/(52-62) 106/53  SpO2:  [95 %-97 %] 96 " %    Physical Exam  Physical Exam  Constitutional:       General: He is not in acute distress.     Appearance: He is ill-appearing.   HENT:      Mouth/Throat:      Mouth: Mucous membranes are dry.   Eyes:      Pupils: Pupils are equal, round, and reactive to light.      Comments: Normal pupil size and reactivity   Cardiovascular:      Rate and Rhythm: Normal rate and regular rhythm.      Heart sounds: Murmur heard.   Pulmonary:      Effort: Pulmonary effort is normal.      Breath sounds: Rales present.   Abdominal:      General: Bowel sounds are normal. There is no distension.      Tenderness: There is no abdominal tenderness.      Hernia: A hernia is present.   Musculoskeletal:      Right lower leg: Edema present.      Left lower leg: Edema present.      Comments: Weeping edema RLE around his fixation hardware  LLE edema above his BKA wrapping   Skin:     Comments: L BKA stump incision healing well   Neurological:      Comments: GCS 13. Perseverating on words.  Grossly nonfocal but not participating well with exam   Psychiatric:      Comments: Unable to assess         Fluids    Intake/Output Summary (Last 24 hours) at 12/26/2023 1538  Last data filed at 12/26/2023 1200  Gross per 24 hour   Intake 440 ml   Output 550 ml   Net -110 ml       Laboratory  Recent Results (from the past 48 hour(s))   Basic Metabolic Panel    Collection Time: 12/25/23  5:30 AM   Result Value Ref Range    Sodium 126 (L) 135 - 145 mmol/L    Potassium 6.2 (H) 3.6 - 5.5 mmol/L    Chloride 94 (L) 96 - 112 mmol/L    Co2 17 (L) 20 - 33 mmol/L    Glucose 89 65 - 99 mg/dL    Bun 32 (H) 8 - 22 mg/dL    Creatinine 1.40 0.50 - 1.40 mg/dL    Calcium 8.2 (L) 8.5 - 10.5 mg/dL    Anion Gap 15.0 7.0 - 16.0   proBrain Natriuretic Peptide, NT    Collection Time: 12/25/23  5:30 AM   Result Value Ref Range    NT-proBNP 33593 (H) 0 - 125 pg/mL   ESTIMATED GFR    Collection Time: 12/25/23  5:30 AM   Result Value Ref Range    GFR (CKD-EPI) 62 >60 mL/min/1.73 m 2    POTASSIUM SERUM (K)    Collection Time: 12/25/23  3:15 PM   Result Value Ref Range    Potassium 6.2 (H) 3.6 - 5.5 mmol/L   CBC WITHOUT DIFFERENTIAL    Collection Time: 12/26/23  6:02 AM   Result Value Ref Range    WBC 10.9 (H) 4.8 - 10.8 K/uL    RBC 2.95 (L) 4.70 - 6.10 M/uL    Hemoglobin 7.7 (L) 14.0 - 18.0 g/dL    Hematocrit 26.1 (L) 42.0 - 52.0 %    MCV 88.5 81.4 - 97.8 fL    MCH 26.1 (L) 27.0 - 33.0 pg    MCHC 29.5 (L) 32.3 - 36.5 g/dL    RDW 55.1 (H) 35.9 - 50.0 fL    Platelet Count 255 164 - 446 K/uL    MPV 10.1 9.0 - 12.9 fL   Comp Metabolic Panel    Collection Time: 12/26/23  6:02 AM   Result Value Ref Range    Sodium 127 (L) 135 - 145 mmol/L    Potassium 5.9 (H) 3.6 - 5.5 mmol/L    Chloride 96 96 - 112 mmol/L    Co2 18 (L) 20 - 33 mmol/L    Anion Gap 13.0 7.0 - 16.0    Glucose 89 65 - 99 mg/dL    Bun 42 (H) 8 - 22 mg/dL    Creatinine 2.10 (H) 0.50 - 1.40 mg/dL    Calcium 7.9 (L) 8.5 - 10.5 mg/dL    Correct Calcium 8.9 8.5 - 10.5 mg/dL    AST(SGOT) 31 12 - 45 U/L    ALT(SGPT) 16 2 - 50 U/L    Alkaline Phosphatase 143 (H) 30 - 99 U/L    Total Bilirubin 1.5 0.1 - 1.5 mg/dL    Albumin 2.8 (L) 3.2 - 4.9 g/dL    Total Protein 6.6 6.0 - 8.2 g/dL    Globulin 3.8 (H) 1.9 - 3.5 g/dL    A-G Ratio 0.7 g/dL   ESTIMATED GFR    Collection Time: 12/26/23  6:02 AM   Result Value Ref Range    GFR (CKD-EPI) 38 (A) >60 mL/min/1.73 m 2   Lactic acid (lactate)    Collection Time: 12/26/23  2:05 PM   Result Value Ref Range    Lactic Acid 1.9 0.5 - 2.0 mmol/L   CBC With Differential    Collection Time: 12/26/23  2:05 PM   Result Value Ref Range    WBC 9.4 4.8 - 10.8 K/uL    RBC 2.83 (L) 4.70 - 6.10 M/uL    Hemoglobin 7.7 (L) 14.0 - 18.0 g/dL    Hematocrit 24.8 (L) 42.0 - 52.0 %    MCV 87.6 81.4 - 97.8 fL    MCH 27.2 27.0 - 33.0 pg    MCHC 31.0 (L) 32.3 - 36.5 g/dL    RDW 55.1 (H) 35.9 - 50.0 fL    Platelet Count 261 164 - 446 K/uL    MPV 10.0 9.0 - 12.9 fL    Neutrophils-Polys 83.20 (H) 44.00 - 72.00 %    Lymphocytes 11.40 (L)  22.00 - 41.00 %    Monocytes 4.30 0.00 - 13.40 %    Eosinophils 0.30 0.00 - 6.90 %    Basophils 0.10 0.00 - 1.80 %    Immature Granulocytes 0.70 0.00 - 0.90 %    Nucleated RBC 2.00 (H) 0.00 - 0.20 /100 WBC    Neutrophils (Absolute) 7.82 (H) 1.82 - 7.42 K/uL    Lymphs (Absolute) 1.07 1.00 - 4.80 K/uL    Monos (Absolute) 0.40 0.00 - 0.85 K/uL    Eos (Absolute) 0.03 0.00 - 0.51 K/uL    Baso (Absolute) 0.01 0.00 - 0.12 K/uL    Immature Granulocytes (abs) 0.07 0.00 - 0.11 K/uL    NRBC (Absolute) 0.19 K/uL   Comp Metabolic Panel    Collection Time: 23  2:05 PM   Result Value Ref Range    Sodium 129 (L) 135 - 145 mmol/L    Potassium 5.7 (H) 3.6 - 5.5 mmol/L    Chloride 97 96 - 112 mmol/L    Co2 17 (L) 20 - 33 mmol/L    Anion Gap 15.0 7.0 - 16.0    Glucose 89 65 - 99 mg/dL    Bun 45 (H) 8 - 22 mg/dL    Creatinine 2.37 (H) 0.50 - 1.40 mg/dL    Calcium 8.0 (L) 8.5 - 10.5 mg/dL    Correct Calcium 8.9 8.5 - 10.5 mg/dL    AST(SGOT) 37 12 - 45 U/L    ALT(SGPT) 19 2 - 50 U/L    Alkaline Phosphatase 146 (H) 30 - 99 U/L    Total Bilirubin 1.4 0.1 - 1.5 mg/dL    Albumin 2.9 (L) 3.2 - 4.9 g/dL    Total Protein 6.6 6.0 - 8.2 g/dL    Globulin 3.7 (H) 1.9 - 3.5 g/dL    A-G Ratio 0.8 g/dL   CRP QUANTITIVE (NON-CARDIAC)    Collection Time: 23  2:05 PM   Result Value Ref Range    Stat C-Reactive Protein 13.05 (H) 0.00 - 0.75 mg/dL   ESTIMATED GFR    Collection Time: 23  2:05 PM   Result Value Ref Range    GFR (CKD-EPI) 33 (A) >60 mL/min/1.73 m 2   TROPONIN    Collection Time: 23  2:05 PM   Result Value Ref Range    Troponin T 24 (H) 6 - 19 ng/L   EKG    Collection Time: 23  2:14 PM   Result Value Ref Range    Report       [facility] Emergency Dept.    Test Date:  2023  Pt Name:    LESLIE ABY             Department: ER  MRN:        [id number]                      Room:        03  Gender:     Male                         Technician: [id number]  :        [id number]                    Requested By:ER TRIAGE PROTOCOL  Order #:    [id number]                    Reading MD: SIDNEY RODRIGUEZ MD    Measurements  Intervals                                Axis  Rate:       85                           P:          9  AK:         212                          QRS:        -44  QRSD:       210                          T:          15  QT:         465  QTc:        553    Interpretive Statements  Twelve-lead EKG shows a normal sinus rhythm with a ventricular to 85,  prolonged AK interval, right bundle branch block, no ST segment elevation or  depression  Electronically Signed On 12- 14:44:50 PST by SIDNEY RODRIGUEZ MD     Urinalysis    Collection Time: 12/26/23  3:05 PM    Specimen: Urine   Result Value Ref Range    Micro Urine Req Microscopic        Imaging  DX-TIBIA AND FIBULA RIGHT   Final Result      DX-TIBIA AND FIBULA LEFT   Final Result         No obvious destruction of the tibial and fibular stumps.      However, plain film is very insensitive to detect early osteomyelitis. If there is clinical concern, further evaluation with MRI is recommended.         DX-FOOT-2- RIGHT   Final Result      CT-RENAL COLIC EVALUATION(A/P W/O)   Final Result         Limited exam due to lack of IV contrast.      1. No definite evidence of inflammatory change in the abdomen or pelvis.  The study is however limited due to nonuse of intravenous contrast      2. Redemonstration of groundglass and airspace opacities throughout both lungs. Bilateral pleural effusions, right more than left.      3. Epigastric ventral hernia containing the transverse colon. No evidence of obstruction.      4. Diffuse anasarca.      CT-HEAD W/O   Final Result      Head CT without contrast within normal limits. No evidence of acute cerebral infarction, hemorrhage or mass lesion.         DX-CHEST-PORTABLE (1 VIEW)   Final Result         Diffuse groundglass and airspace opacities throughout both lungs, similar to prior.      Trace right  pleural effusion, similar to prior.      EC-ECHOCARDIOGRAM COMPLETE W/O CONT    (Results Pending)       Assessment/Plan  * Acute encephalopathy- (present on admission)  Assessment & Plan  Differential includes cefepime related encephalopathy, infection related, micronutrient deficiency given his gastric bypass (less likely with acute onset), opiate excess or decreased opiate clearance d/t his renal failure.  Mild uremia is likely contributing    -recommend switching from cefepime to a different antibiotic given the relatively high incidence of neurotoxicity with cefepime. Consider zosyn until cultures return from this admission  -agree with thiamine  -if encephalopathy is persistent, consider trace metal deficiency, etc  -reduce dose of opiates while altered, or avoid altogether although he may start withdrawing  -avoid other sedating agents  -delirium precautions  -TSH OK  -check cortisol, ammonia    Acute renal failure (ARF) (HCC)- (present on admission)  Assessment & Plan  Antibiotic/AIN related vs cardiorenal.  I think much less likely to be hypovolemic as he has significant JVD and his chest imaging is quite overloaded    -recommend diuresis  -consider nephrology consultation for further evaluation of possible AIN   -piedra for strict I&O  -CT KUB without hydro    Acute respiratory failure with hypoxia (HCC)- (present on admission)  Assessment & Plan  His chest imaging is quite concerning.  He had a small amount of GGO on his October CT chest.  His CXRs since that time have had progressive GGO and nodular changes, as well as effusion. He was bacteremic in October, but not known to be bacteremic since that time and was on appropriate antibiotics, so theoretically should not have septic pulmonary emboli.  If these are septic emboli, that would be concerning for an untreated bloodstream infection and/or right sided endocarditis. The differential includes pulmonary infarcts, but his PE burden is quite minimal so I  think that's less likely. He has severe RA, and I'm unable to tell if he's been receiving his humira regularly throughout these hospital stays. While less likely, the abnormal chest CT could be lung injury from RA.  Certainly volume overload if worsening his respiratory status and chest imaging, but I don't believe that's the whole story.     -recommend diuresis  -recommend consulting pulmonology in the morning to discuss possible bronchoscopy to help further delineate the cause of his lung disease  -sending RA serology  -recommend a MIA to evaluate right sided endocarditis, which I have ordered  -blood cultures pending    Pulmonary embolism (HCC)  Assessment & Plan  Incidental finding on his TAVR workup CT  Discussed with Dr. Stark, they are likely not acute  Patient is already anticoagulated.  Continue therapeutic anticoagulation    Right bundle branch block  Assessment & Plan  Intermittently seen on prior EKGs  Possibly rate related    Hyperkalemia- (present on admission)  Assessment & Plan  Mildly elevated at 5.7 most recently  Recommend diuresis  trend      Other problems per hospitalist. Medically very complex patient at high risk of deteriorating.

## 2023-12-26 NOTE — ED TRIAGE NOTES
"Chief Complaint   Patient presents with    ALOC     Pt BIB EMS from [facility]. Pt has history of aortic stenosis, HF, bacteremia, and underwent right lower extremity surgery with external fixation 12/6. Per EMS, staff called emergency services because pt was tachypneic and due to abnormal labs. Staff unable to elaborate on what lab values they were calling about. Upon EMS arrival, pt was altered and fatigued arousable to voice, oriented to self only. Per staff, pt is normally A&Ox4. EMS was unable to get a clear LKW, but pt reportedly has been in this state for the last two days. Upon EMS arrival, pt was in the 80% on RA. Placed pt on 6L NC.     Sepsis score 6. Code sepsis activated.     BP 95/52   Pulse 92   Temp 36.7 °C (98 °F) (Oral)   Resp (!) 22   Ht 1.803 m (5' 11\")   Wt 97.5 kg (214 lb 15.2 oz)   SpO2 96%   BMI 29.98 kg/m²     "

## 2023-12-26 NOTE — THERAPY
Occupational Therapy  Daily Treatment     Patient Name: Richard Hubbard II  Age:  48 y.o., Sex:  male  Medical Record #: [id number]  Today's Date: 12/26/2023     Precautions  Precautions: Fall Risk, Non Weight Bearing Right Lower Extremity, Non Weight Bearing Left Lower Extremity  Comments: RLE ex fix, 4-5 L O2         Subjective    Pt was in bed sleeping. Pt was pail in color w/ shallow breaths and cold sweats, however would awake when name was called.      Objective       12/26/23 0901   Therapy Missed   Missed Therapy (Minutes) 15   OT Charge Group   OT Self Care / ADL (Units) 2   OT Therapy Activity (Units) 1   OT Total Time Spent   OT Individual Total Time Spent (Mins) 45   Vitals   Pulse 93   Patient BP Position Supine   Blood Pressure 103/65   Pulse Oximetry 94 %   O2 (LPM) 4   O2 Delivery Device Nasal Cannula   Vitals Comments MD aware   Functional Level of Assist   Grooming Maximal Assist  (wipe face w/ damp wash cloth.)   Grooming Description Increased time;Set-up of equipment;Verbal cueing  (Needed Platinum assistance w/ wiping face w/ VC for areas missed d/t weakness and fatigue)   Upper Body Dressing Minimal Assist   Upper Body Dressing Description Increased time;Assit with threading arms through sleeves;Set-up of equipment;Verbal cueing   Balance   Sitting Balance (Static) Poor   Sitting Balance (Dynamic) Poor   Bed Mobility    Supine to Sit Maximal Assist   Sit to Supine Maximal Assist   Scooting Maximal Assist   Skilled Intervention Verbal Cuing;Postural Facilitation   Interdisciplinary Plan of Care Collaboration   IDT Collaboration with  Physician   Patient Position at End of Therapy In Bed;Tray Table within Reach;Call Light within Reach   Collaboration Comments Communicated to MD about shallow breathing, cold sweat, and lethargic. MD aware of PB, ME, and temp. Pt was okay w/ holding therapy if need be.         Assessment    Max A for all bed mob. Pt was able agreeable to sit EOB and was able to  "follow simple commands, however needed assistance w/ LE as well as sitting up and scooting EOB. Once seated up pt demonstrated poor sitting balance for ~20-25min. Pt still had shallow breathing and all vitals were checked (BP:99/61, Temp:98.1, MS:94, O2: 96). Instructed pt in UB dressing and light grooming EOB however needed assistance with tasks. Pt was placed supine and repo for comfort. MD arrived in room for a consult w/ pt and therapist updated on pt current condition.     Strengths: Able to follow instructions, Effective communication skills, Independent prior level of function, Pleasant and cooperative, Supportive family  Barriers: Decreased endurance, Fatigue, Generalized weakness, Impaired activity tolerance, Impaired balance, Limited mobility, Pain    Plan    Continue POC     DME  OT DME Recommendations  Bathroom Equipment: Drop Arm Commode  Additional Equipment: Transfer Board (30\" Length)    Passport items to be completed:  Perform bathroom transfers, complete dressing, complete feeding, get ready for the day, prepare a simple meal, participate in household tasks, adapt home for safety needs, demonstrate home exercise program, complete caregiver training     Occupational Therapy Goals (Active)       Problem: Bathing       Dates: Start:  12/23/23         Goal: STG-Within one week, patient will bathe with Min A.       Dates: Start:  12/23/23         Goal Note filed on 12/26/23 0815 by Camelia Mckeon MS,OTR/L       Requires Mod A                 Problem: Dressing       Dates: Start:  12/23/23         Goal: STG-Within one week, patient will dress UB with SBA.       Dates: Start:  12/23/23         Goal Note filed on 12/26/23 0815 by Camelia Mckeon MS,OTR/L       Requires Min A              Goal: STG-Within one week, patient will dress LB with Max/Mod A.       Dates: Start:  12/23/23         Goal Note filed on 12/26/23 0815 by Camelia Mckeon MS,OTR/L       Requires Total A                 " Problem: Functional Transfers       Dates: Start:  12/23/23         Goal: STG-Within one week, patient will transfer to toilet with Max A.       Dates: Start:  12/23/23         Goal Note filed on 12/26/23 0815 by Camelia Mckeon MS,OTR/L       Unable to participate                  Problem: OT Long Term Goals       Dates: Start:  12/23/23         Goal: LTG-By discharge, patient will complete basic self care tasks with CGA/Supervision.       Dates: Start:  12/23/23            Goal: LTG-By discharge, patient will perform bathroom transfers with CGA.       Dates: Start:  12/23/23

## 2023-12-26 NOTE — ASSESSMENT & PLAN NOTE
CXR worsening severe bilat diffuse airspace disease, small right eff  Continue Cefepime (on for BKA infection)  On Zyvox (thru 12/30) per Dr. Collins  On 4 liters O2 supplementation

## 2023-12-26 NOTE — H&P
Hospital Medicine History & Physical Note    Date of Service  12/26/2023    Primary Care Physician  Johnathan Dover M.D.    Consultants  cardiology, critical care, and infectious disease      Code Status  Prior    Chief Complaint  Chief Complaint   Patient presents with    ALOC       History of Presenting Illness  Richard Hubbard II is a 48 y.o. male who presented 12/26/2023 with ALOC  He has a history obesity s/p gastric bypass, chronic pain on narcotics, aortic stenosis/bicuspid aortic valve with mod pul HTN, rheumatoid arthritis on Humira flatfoot s/p open reduction of right subtalar joint dislocation, right talonavicular joint dislocation right foot triple arthrodesis, right extensor tendon lengthening and application of multiplanar external fixator right lower extremity c/b MSSA bactermia and osteomyelitis on 12/6. He was recently discharged to rehab for cardiogenic shock, echo revealing EF 45%, severe aortic stenosis with mod AI, TR planned for TAVR in a month when stable and is on anticoagulation for Afib, but was also septic from s/p L below knee reamputation c/b infected stump and osteomyelitis, culture positive for Serratia on IV Invanz planned STOP date 1/23/2024, however a preTAVR CT scan showed septic pulmonary emboli. He is now presenting with altered mentation and somnolence.   At the ED, he is afebrile, SBPs , hypoxic put on 5LO2NC. Some bouts of altered mentation on initial exam. CXR showed similar ground glass opacities and trace pleural eff as prior. No leukocytosis. Hb 7.7. Na 129, K 5.7, Cr-2.37, elevated troponin and BNP.    When I saw him in the ER, he was sleeping but arousable. I asked for his name and he asked for water. He has slight confusion. Generalized weakness, no obvious gross motor deficits.     Possible multifactorial (septic, drug/narcotic induced) encephalopathy. Given history of bypass also r/o Wernicke's encephalopathy. Head CT PENDING came back no bleed or mass  effect (thus we can restart anticoagulation). I ordered IV thiamine and ordered thiamine levels. I spoke with Dr. Clifford and we agreed on cefepime for now for antibiotics. Because of a dirty urinalysis and acute kidney injury I ordered a CT renal and that is PENDING. Blood and urine cultures ordered and PENDING. Initiated sepsis protocol with slightly less IV fluid resuscitation protocol given known mild HFrEF (EF45%). SBP 90-100s, admit to IMCU. Only PRN narcotics for now (was on chronic narcotics). Cardiology consulted, for now will postpone preTAVR planning due to infection.     SOAP note:    Richard Hubbard II is and remains critically ill.  The patient continues to have: cardiorespiratory and hemodynamic issues  The vital organ system that is affected is the: Cardiovascular, pulmonary and hemodynamics  If untreated there is a high chance of deterioration into: Cardiorespiratory collapse  And eventually death.   The critical care that I am providing today:   The critical management that has been undertaken is medically complex.   There has been no overlap in critical care time.   I personally provided 60 minutes critical care time and extensive data review exclusive of time spent on procedures. Time includes review of laboratory data, imaging, discussion with consultants and staff and monitoring hemodynamics for potential decompensation  Start time: 400pm  Stop time: 500pm    I discussed the plan of care with EDP, Pharmacy, ID physician    Review of Systems  Review of Systems   Unable to perform ROS: Critical illness       Past Medical History   has a past medical history of ADD (attention deficit disorder), Alcohol abuse, Allergic rhinitis (05/21/2009), Anemia (09/2019), Anxiety (05/21/2009), Apnea, sleep, Arrhythmia, Back pain (05/21/2009), Bipolar disorder (HCC), Bleeding ulcer (05/21/2009), Bleeding ulcer (05/21/2009), Bowel habit changes, Breath shortness, Congenital anomaly of mitral valve,  "Congestive heart failure (HCC), Daytime sleepiness, Dental disorder, Depression (05/21/2009), Eczema (05/21/2009), GERD (gastroesophageal reflux disease), Heart burn, Heart murmur, Hemorrhagic disorder ([facility]), History of bleeding ulcers (02/12/2015), Hypertension (05/03/2021), Insomnia (05/21/2009), Intestinal hernia, Migraine (05/21/2009), Morning headache, Obesity, morbid (HCC) (05/21/2009), Osteomyelitis ([facility]) (10/07/2019), Pain (09/2019), Pneumonia, Pubic ramus fracture ([facility]) (09/28/2019), Rheumatoid arteritis ([facility]) (09/2019), Sleep apnea (05/21/2009), and Snoring.    Surgical History   has a past surgical history that includes gastric bypass laparoscopic (2000); irrigation & debridement ortho (Left, 10/09/2019); other surgical procedure; other surgical procedure (2019); pr total knee arthroplasty (Right, 07/22/2020); cholecystectomy (2000); tendon transfer (Right, 01/22/2020); other orthopedic surgery (Right, 2020); knee amputation below (Left, 03/09/2023); irrigation & debridement general (Left, 10/19/2023); incision and drainage orthopedic (Left, 10/22/2023); orif, ankle (Right, 12/6/2023); and knee amputation below (Left, 12/16/2023).     Family History  family history includes Arthritis in his mother; Cancer in his father and maternal grandmother; Depression in his mother; Heart Disease in his maternal grandmother; Hypertension in his mother; Psychiatric Illness in an other family member; Schizophrenia in his father; Stroke in his maternal aunt.   Family history reviewed with patient. There is no family history that is pertinent to the chief complaint.     Social History   reports that he has been smoking cigarettes and cigars. He has been exposed to tobacco smoke. He has never used smokeless tobacco. He reports that he does not drink alcohol and does not use drugs.    Allergies  Allergies   Allergen Reactions    Nsaids      Bleeding, \"I had a bleeding ulcer\"    Diphenhydramine Unspecified     \"I get " "agitated\"    Mirtazapine Unspecified     I had a hard time waking up, lacking mental focus.     Penicillins      \"Had some dizziness\"  Tolerated Unasyn 10/2019  Tolerated Zosyn 02/2023    Promethazine Unspecified     \"I get very agitated\"       Medications  Prior to Admission Medications   Prescriptions Last Dose Informant Patient Reported? Taking?   DULoxetine (CYMBALTA) 60 MG Cap DR Particles delayed-release capsule  Patient No No   Sig: Take 1 Capsule by mouth every day.   MORPHINE SULFATE ER PO  Patient Yes No   Sig: Take 15 mg by mouth 2 times a day.   MOVANTIK 25 MG Tab  Patient Yes No   Sig: Take 25 mg by mouth every day.   Naloxone (NARCAN) 4 MG/0.1ML Liquid  Patient No No   Sig: One spray in one nostril for overdose and call 911.   Patient taking differently: Administer 1 Spray into affected nostril(S) one time. One spray in one nostril for overdose and call 911.   acetaminophen (TYLENOL) 500 MG Tab  Patient No No   Sig: Take 2 Tablets by mouth in the morning, at noon, and at bedtime.   albuterol 108 (90 Base) MCG/ACT Aero Soln inhalation aerosol  Patient No No   Sig: Inhale 2 Puffs every 6 hours as needed for Shortness of Breath.   apixaban (ELIQUIS) 5mg Tab   No No   Sig: Take 1 Tablet by mouth 2 times a day. Indications: Thromboembolism secondary to Atrial Fibrillation   dapagliflozin propanediol (FARXIGA) 10 MG Tab  Patient No No   Sig: Take 1 Tablet by mouth every day for 90 days.   dronabinol (MARINOL) 2.5 MG Cap   No No   Sig: Take 1 Capsule by mouth 2 times a day as needed (appetite) for up to 10 days.   morphine (MS IR) 30 MG tablet  Patient Yes No   Sig: Take 30 mg by mouth 4 times a day.   multivitamin Tab  Patient Yes No   Sig: Take 1 Tablet by mouth every day.   omeprazole (PRILOSEC) 20 MG delayed-release capsule  Patient No No   Sig: Take 1 Capsule by mouth every day.   polyethylene glycol/lytes (MIRALAX) Pack   No No   Sig: Take 1 Packet by mouth 1 time a day as needed (if sennosides and " docusate ineffective after 24 hours).   senna-docusate (PERICOLACE OR SENOKOT S) 8.6-50 MG Tab   No No   Sig: Take 2 Tablets by mouth 2 times a day.   spironolactone (ALDACTONE) 25 MG Tab   No No   Sig: Take 1 Tablet by mouth every day.   vitamin D3 (CHOLECALCIFEROL) 1000 Unit (25 mcg) Tab  Patient No No   Sig: Take 1 Tablet by mouth every day.      Facility-Administered Medications: None       Physical Exam  Temp:  [36.6 °C (97.8 °F)-37.1 °C (98.7 °F)] 36.7 °C (98 °F)  Pulse:  [82-93] 88  Resp:  [14-22] 16  BP: ()/(52-65) 106/53  SpO2:  [94 %-97 %] 96 %  Blood Pressure: 106/53   Temperature: 36.7 °C (98 °F)   Pulse: 88   Respiration: 16   Pulse Oximetry: 96 %       Physical Exam  Vitals and nursing note reviewed.   Constitutional:       Appearance: He is obese. He is ill-appearing.   HENT:      Head: Normocephalic and atraumatic.      Right Ear: External ear normal.      Left Ear: External ear normal.      Nose: Nose normal.      Mouth/Throat:      Mouth: Mucous membranes are moist.   Eyes:      General: No scleral icterus.     Conjunctiva/sclera: Conjunctivae normal.   Cardiovascular:      Rate and Rhythm: Tachycardia present. Rhythm irregular.      Heart sounds: Murmur heard.      No friction rub. No gallop.   Pulmonary:      Effort: Pulmonary effort is normal.      Breath sounds: Rales (occ bibasilar crackles) present.   Abdominal:      General: Abdomen is flat. Bowel sounds are normal. There is no distension.      Palpations: Abdomen is soft.      Tenderness: There is no abdominal tenderness. There is no guarding.   Musculoskeletal:         General: Deformity (L BKA, external fixator device on the R) present. Normal range of motion.      Cervical back: Normal range of motion and neck supple.   Skin:     General: Skin is warm.      Findings: Bruising present.   Neurological:      Mental Status: He is alert. He is disoriented.   Psychiatric:      Comments: Somnolent         Laboratory:  Recent Labs      12/24/23  0500 12/26/23  0602 12/26/23  1405   WBC 9.5 10.9* 9.4   RBC 3.13* 2.95* 2.83*   HEMOGLOBIN 8.3* 7.7* 7.7*   HEMATOCRIT 28.0* 26.1* 24.8*   MCV 89.5 88.5 87.6   MCH 26.5* 26.1* 27.2   MCHC 29.6* 29.5* 31.0*   RDW 55.2* 55.1* 55.1*   PLATELETCT 255 255 261   MPV 10.6 10.1 10.0     Recent Labs     12/25/23  0530 12/25/23  1515 12/26/23  0602 12/26/23  1405   SODIUM 126*  --  127* 129*   POTASSIUM 6.2* 6.2* 5.9* 5.7*   CHLORIDE 94*  --  96 97   CO2 17*  --  18* 17*   GLUCOSE 89  --  89 89   BUN 32*  --  42* 45*   CREATININE 1.40  --  2.10* 2.37*   CALCIUM 8.2*  --  7.9* 8.0*     Recent Labs     12/25/23  0530 12/26/23  0602 12/26/23  1405   ALTSGPT  --  16 19   ASTSGOT  --  31 37   ALKPHOSPHAT  --  143* 146*   TBILIRUBIN  --  1.5 1.4   GLUCOSE 89 89 89         Recent Labs     12/25/23  0530   NTPROBNP 65712*         Recent Labs     12/26/23  1405   TROPONINT 24*       Imaging:  DX-CHEST-PORTABLE (1 VIEW)   Final Result         Diffuse groundglass and airspace opacities throughout both lungs, similar to prior.      Trace right pleural effusion, similar to prior.      CT-HEAD W/O    (Results Pending)       Assessment/Plan:  Justification for Admission Status  I anticipate this patient will require at least two midnights for appropriate medical management, necessitating inpatient admission because remains encephalopathic with possible sepsis, UTI, pneumonia osteomyelitis as causes, has underlying aortic stenosis and mild heart failure as well.    Patient will need a Intermediate Care (Adult and Pediatrics) bed on EMERGENCY service .  The need is secondary to soft blood pressures in the setting of sepsis and severe aortic stenosis, high risk for septic and cardiogenic shock.    * Acute encephalopathy, hypoxia,L BKA tump infx and osteomyelitis, Serratia positive, chronic narcotics, mild  HFrEF/severe aortic stenosis- (present on admission)  Assessment & Plan  Septic, hypoxic, drug induced and/or Wernicke's in the  differential. Ordered IV thiamine and thiamine levels. O2 per protocol, stop chronic narcotics only PRN if in pain.Head CT no bleed (restart apixaban), Address potential sepsis and infection, IV fluids but gentler given mild HFrEF and hypoxia. Discussed with Dr. Clifford, ID, sisi to start cefepime for now. Consult intensivist or pulmonologist at some point given persistent lung opacities and diagnosis of septic pulmonary emboli (may need bronch of note patient on ELiquis could potentially switch to hepa gtt for poossible procedures), ordered repeat echo (patient has murmur on exam), follow blood and urine cultures and will get CT renal (PENDING) r/o hydronephrosis/renal abscess/pyelonephritis, ordered XR of shins and R foot (PENDING, may need CT if there is suspicion of abscess) and consult Orthopedics depending on the findings. Patient did wake up asking for water but if he fails bedside swallow will need Cortrak and Speech for medications.     Chronic HFrEF (heart failure with reduced ejection fraction) ([facility])  Assessment & Plan  Noted,  Monitor fluid resuscitation and respiratory status    Acute respiratory failure with hypoxia (HCC)- (present on admission)  Assessment & Plan  Septic pulmonary emboli, known chronic mild HFrEF, possible infection  Resp and O2 per protocol  As above    Anemia- (present on admission)  Assessment & Plan  No obvious active bleeding. Does not appear to be hemolytic or DIC process with normal platelets and bilirubin  Transfuse if Hb<7 or <8 if hypotensive or cardiology feels this is needed meanwhile getting fluids. If transfusing may diuretics if there is CHF or pulmonary edema    History of gastric bypass- (present on admission)  Assessment & Plan  High risk Wernicke's  Ordered IV thiamine and B1 levels    History of osteomyelitis- (present on admission)  Assessment & Plan  Started cefepime  ID consulted    Sepsis ([facility])- (present on admission)  Assessment & Plan  This is Sepsis Present  on admission  SIRS criteria identified on my evaluation include: Tachycardia, with heart rate greater than 90 BPM and Tachypnea, with respirations greater than 20 per minute  Clinical indicators of end organ dysfunction include Acute Renal Failure, with a finding of creatinine greater than 2 (patient does not have ESRD or CKD  Source is respiratory or urinary or skin/soft tissue  Sepsis protocol initiated  Crystalloid Fluid Administration: Resuscitation volume ordered. Reason that resuscitation volume of less than 30ml/kg was ordered concern for causing harm given CHF  IV antibiotics as appropriate for source of sepsis  Reassessment: I have reassessed the patient's hemodynamic status    Cefepime started  IV fluids less than 30m/kg given known oartic stenosis and mild reduction in EF  ID consulted    Severe aortic stenosis- (present on admission)  Assessment & Plan  Avoid hypotension or hypertension  Cardiology consulted.    Chronic narcotic use- (present on admission)  Assessment & Plan  Avoid narcotics for now, make it PRN  Narcan if needed currently patient is arousable    I odered IV fluids and Lokema for hyperkalemia.Repeat renal function. If K still elevated can do insulin plus dextrose however he can get hypoglycemic.  VTE prophylaxis: Eliquis

## 2023-12-26 NOTE — ED PROVIDER NOTES
ED Provider Note    CHIEF COMPLAINT  Chief Complaint   Patient presents with    ALOC       EXTERNAL RECORDS REVIEWED  Other reviewed a note from Dr. Galvez from interventional cardiology that noted that radiology told him the patient has septic emboli to the lungs on a TAVR planning CT scan.  The patient is currently on antibiotics and Eliquis.  That CT scan was performed on 22 December the patient is currently on Zyvox and Maxipime    HPI/ROS  LIMITATION TO HISTORY   Select: Altered mental status / Confusion  OUTSIDE HISTORIAN(S):  [facility] states that they were contacted by [facility] nursing [facility] after the patient was found altered.  Due to the staffing at the facility most of the staff currently at the facility were not well-known to the patient.  It is unclear when he became altered but it sounds like it was within the last 48 to 72 hours.    Richard Hubbard II is a 48 y.o. male who presents in an altered state.  The patient has a complex history.  He is currently on antibiotics for pneumonia as well as osteomyelitis of the left lower extremity that is post below the knee amputation.  I also reviewed the patient's progress note from Dr. Galicia at the [facility] nursing [facility] from yesterday.  The patient seemed to be coherent at that time.  Is also noted the patient has a history of chronic diastolic heart failure as well as atrial fibrillation.  No other history could be obtained as the patient would not answer questions appropriately.    PAST MEDICAL HISTORY   has a past medical history of ADD (attention deficit disorder), Alcohol abuse, Allergic rhinitis (05/21/2009), Anemia (09/2019), Anxiety (05/21/2009), Apnea, sleep, Arrhythmia, Back pain (05/21/2009), Bipolar disorder ([facility]), Bleeding ulcer (05/21/2009), Bleeding ulcer (05/21/2009), Bowel habit changes, Breath shortness, Congenital anomaly of mitral valve, Congestive heart failure (HCC), Daytime sleepiness, Dental disorder, Depression  (2009), Eczema (2009), GERD (gastroesophageal reflux disease), Heart burn, Heart murmur, Hemorrhagic disorder ([facility]), History of bleeding ulcers (2015), Hypertension (2021), Insomnia (2009), Intestinal hernia, Migraine (2009), Morning headache, Obesity, morbid ([facility]) (2009), Osteomyelitis ([facility]) (10/07/2019), Pain (2019), Pneumonia, Pubic ramus fracture ([facility]) (2019), Rheumatoid arteritis ([facility]) (2019), Sleep apnea (2009), and Snoring.    SURGICAL HISTORY   has a past surgical history that includes gastric bypass laparoscopic (); irrigation & debridement ortho (Left, 10/09/2019); other surgical procedure; other surgical procedure (); total knee arthroplasty (Right, 2020); cholecystectomy (); tendon transfer (Right, 2020); other orthopedic surgery (Right, ); knee amputation below (Left, 2023); irrigation & debridement general (Left, 10/19/2023); incision and drainage orthopedic (Left, 10/22/2023); orif, ankle (Right, 2023); and knee amputation below (Left, 2023).    FAMILY HISTORY  Family History   Problem Relation Age of Onset    Hypertension Mother     Arthritis Mother     Depression Mother     Cancer Father         bladder    Schizophrenia Father     Cancer Maternal Grandmother         breast    Heart Disease Maternal Grandmother     Psychiatric Illness Other     Stroke Maternal Aunt     Other Neg Hx         no connective tissue disorders or known aortic disease       SOCIAL HISTORY  Social History     Tobacco Use    Smoking status: Some Days     Current packs/day: 0.00     Types: Cigarettes, Cigars     Last attempt to quit: 2023     Years since quittin.8     Passive exposure: Current    Smokeless tobacco: Never    Tobacco comments:     occasional cigar.  23:  Patient declines smoking cessation information at this time.    Vaping Use    Vaping Use: Never used   Substance and Sexual Activity    Alcohol  "use: No     Alcohol/week: 0.0 oz     Comment: quit 2011 ago- past ETOH abuse    Drug use: Never    Sexual activity: Yes     Partners: Female     Comment: ;        CURRENT MEDICATIONS  Home Medications    **Home medications have not yet been reviewed for this encounter**         ALLERGIES  Allergies   Allergen Reactions    Nsaids      Bleeding, \"I had a bleeding ulcer\"    Diphenhydramine Unspecified     \"I get agitated\"    Mirtazapine Unspecified     I had a hard time waking up, lacking mental focus.     Penicillins      \"Had some dizziness\"  Tolerated Unasyn 10/2019  Tolerated Zosyn 02/2023    Promethazine Unspecified     \"I get very agitated\"       PHYSICAL EXAM  VITAL SIGNS: BP 95/52   Pulse 92   Temp 36.7 °C (98 °F) (Oral)   Resp (!) 22   Ht 1.803 m (5' 11\")   Wt 97.5 kg (214 lb 15.2 oz)   SpO2 96%   BMI 29.98 kg/m²    In general the patient appears ill    HEENT unremarkable except for dry mucous membranes    Pulmonary the patient's lungs are symmetrically diminished throughout    Cardiovascular S1-S2 with a regular rate and rhythm    GI the patient has a reducible ventral hernia     mild scrotal and penile edema    Skin some erythema to the right stump as well as some erythema to the right leg where he has an external fixator in place    Neurologic examination the patient will open his eyes to commands but he will not answer questions appropriately.  He does move all 4 extremities to command    DIAGNOSTIC STUDIES  Results for orders placed or performed during the hospital encounter of 12/26/23   Lactic acid (lactate)   Result Value Ref Range    Lactic Acid 1.9 0.5 - 2.0 mmol/L   CBC With Differential   Result Value Ref Range    WBC 9.4 4.8 - 10.8 K/uL    RBC 2.83 (L) 4.70 - 6.10 M/uL    Hemoglobin 7.7 (L) 14.0 - 18.0 g/dL    Hematocrit 24.8 (L) 42.0 - 52.0 %    MCV 87.6 81.4 - 97.8 fL    MCH 27.2 27.0 - 33.0 pg    MCHC 31.0 (L) 32.3 - 36.5 g/dL    RDW 55.1 (H) 35.9 - 50.0 fL    Platelet Count 261 " 164 - 446 K/uL    MPV 10.0 9.0 - 12.9 fL    Neutrophils-Polys 83.20 (H) 44.00 - 72.00 %    Lymphocytes 11.40 (L) 22.00 - 41.00 %    Monocytes 4.30 0.00 - 13.40 %    Eosinophils 0.30 0.00 - 6.90 %    Basophils 0.10 0.00 - 1.80 %    Immature Granulocytes 0.70 0.00 - 0.90 %    Nucleated RBC 2.00 (H) 0.00 - 0.20 /100 WBC    Neutrophils (Absolute) 7.82 (H) 1.82 - 7.42 K/uL    Lymphs (Absolute) 1.07 1.00 - 4.80 K/uL    Monos (Absolute) 0.40 0.00 - 0.85 K/uL    Eos (Absolute) 0.03 0.00 - 0.51 K/uL    Baso (Absolute) 0.01 0.00 - 0.12 K/uL    Immature Granulocytes (abs) 0.07 0.00 - 0.11 K/uL    NRBC (Absolute) 0.19 K/uL   Comp Metabolic Panel   Result Value Ref Range    Sodium 129 (L) 135 - 145 mmol/L    Potassium 5.7 (H) 3.6 - 5.5 mmol/L    Chloride 97 96 - 112 mmol/L    Co2 17 (L) 20 - 33 mmol/L    Anion Gap 15.0 7.0 - 16.0    Glucose 89 65 - 99 mg/dL    Bun 45 (H) 8 - 22 mg/dL    Creatinine 2.37 (H) 0.50 - 1.40 mg/dL    Calcium 8.0 (L) 8.5 - 10.5 mg/dL    Correct Calcium 8.9 8.5 - 10.5 mg/dL    AST(SGOT) 37 12 - 45 U/L    ALT(SGPT) 19 2 - 50 U/L    Alkaline Phosphatase 146 (H) 30 - 99 U/L    Total Bilirubin 1.4 0.1 - 1.5 mg/dL    Albumin 2.9 (L) 3.2 - 4.9 g/dL    Total Protein 6.6 6.0 - 8.2 g/dL    Globulin 3.7 (H) 1.9 - 3.5 g/dL    A-G Ratio 0.8 g/dL   Urinalysis    Specimen: Urine   Result Value Ref Range    Micro Urine Req Microscopic    CRP QUANTITIVE (NON-CARDIAC)   Result Value Ref Range    Stat C-Reactive Protein 13.05 (H) 0.00 - 0.75 mg/dL   ESTIMATED GFR   Result Value Ref Range    GFR (CKD-EPI) 33 (A) >60 mL/min/1.73 m 2   TROPONIN   Result Value Ref Range    Troponin T 24 (H) 6 - 19 ng/L   EKG   Result Value Ref Range    Report       [facility] Emergency Dept.    Test Date:  2023  Pt Name:    LESLIE BADILLO             Department: ER  MRN:        [id number]                      Room:       RD 03  Gender:     Male                         Technician: [id number]  :        [id number]                    Requested By:ER TRIAGE PROTOCOL  Order #:    [id number]                    Reading MD: SIDNEY RODRIGUEZ MD    Measurements  Intervals                                Axis  Rate:       85                           P:          9  MA:         212                          QRS:        -44  QRSD:       210                          T:          15  QT:         465  QTc:        553    Interpretive Statements  Twelve-lead EKG shows a normal sinus rhythm with a ventricular to 85,  prolonged MA interval, right bundle branch block, no ST segment elevation or  depression  Electronically Signed On 12- 14:44:50 PST by SIDNEY RODRIGUEZ MD       *Note: Due to a large number of results and/or encounters for the requested time period, some results have not been displayed. A complete set of results can be found in Results Review.       EKG  I have independently interpreted this EKG  Please see my interpretation above      RADIOLOGY  DX-CHEST-PORTABLE (1 VIEW)   Final Result         Diffuse groundglass and airspace opacities throughout both lungs, similar to prior.      Trace right pleural effusion, similar to prior.      CT-HEAD W/O    (Results Pending)       COURSE & MEDICAL DECISION MAKING    This is a 48-year-old gentleman who presents acutely ill in appearance.  I do not have a clear source for his current altered state as well as acute illness.  Sepsis would be high in the differential and the patient did receive cefepime for antibiotic coverage after speaking with pharmacy and he also received a fluid bolus.  I did not want to be overly aggressive with fluid resuscitation due to the patient's low ejection fraction and aortic valve insufficiency.  After 500 cc bolus the patient's blood pressure has responded and his pulse is increased.  Laboratory and Alysis does not show any evidence of lactic acidosis.  The patient does have acute renal sufficiency which is concerning.  Some of this could be from medication  versus prerenal.  He does have some edema throughout but this could be from his debilitated state.  Chest x-ray does not show any evidence of pneumonia.  The patient does have anemia but no active evidence of bleeding.  The patient's urinalysis is pending and this would be on the differential as a potential etiology and with his current antibiotic regimen a UTI would be unlikely but needs to be followed.  I spoke with the intensivist regarding the case and she feels the patient can be safely managed at this time at the [facility].  Therefore the hospitalist will be notified.  The patient will be admitted to the hospital in guarded condition.    FINAL DIAGNOSIS  1.  Altered mental status  2.  Rule out sepsis  3.  Acute renal insufficiency  4.  Anemia  5.  Chronic osteomyelitis left lower extremity  6.  Critical care time 35 minutes       Electronically signed by: Bartolome Gomes M.D., 12/26/2023 2:18 PM

## 2023-12-26 NOTE — PROGRESS NOTES
Hospital Medicine Daily Progress Note      Chief Complaint  Anemia  Hyponatremia  Abnormal Thyroid Studies    Interval Problem Update  Pt reports no significant improvement in SOB.  Labs reviewed.    Review of Systems  Review of Systems   Constitutional:  Negative for chills and fever.   HENT: Negative.     Eyes: Negative.    Respiratory:  Positive for shortness of breath. Negative for cough and hemoptysis.    Cardiovascular:  Negative for chest pain and palpitations.   Gastrointestinal:  Negative for abdominal pain, nausea and vomiting.   Musculoskeletal:         Wound pain   Skin:  Negative for itching and rash.   Endo/Heme/Allergies:  Negative for polydipsia. Does not bruise/bleed easily.        Physical Exam  Temp:  [36.2 °C (97.1 °F)-36.7 °C (98 °F)] 36.2 °C (97.1 °F)  Pulse:  [84-90] 88  Resp:  [17-20] 20  BP: (105-112)/(54-60) 105/60  SpO2:  [93 %-99 %] 95 %    Physical Exam  Vitals reviewed.   Constitutional:       General: He is not in acute distress.     Appearance: Normal appearance. He is not ill-appearing.   HENT:      Head: Normocephalic and atraumatic.      Right Ear: External ear normal.      Left Ear: External ear normal.      Nose: Nose normal.      Mouth/Throat:      Pharynx: Oropharynx is clear.   Eyes:      General:         Right eye: No discharge.         Left eye: No discharge.      Extraocular Movements: Extraocular movements intact.      Conjunctiva/sclera: Conjunctivae normal.   Cardiovascular:      Rate and Rhythm: Normal rate and regular rhythm.   Pulmonary:      Effort: No respiratory distress.      Breath sounds: No wheezing or rales.   Abdominal:      General: Bowel sounds are normal. There is no distension.      Palpations: Abdomen is soft.      Tenderness: There is no abdominal tenderness. There is no guarding.   Musculoskeletal:      Cervical back: Normal range of motion and neck supple.      Right lower leg: Edema present.      Comments: L BKA wound photos reviewed  RLE external  fixator   Skin:     General: Skin is warm and dry.   Neurological:      Mental Status: He is alert and oriented to person, place, and time.         Fluids      Laboratory      Imaging    Assessment/Plan  Hyperkalemia  Assessment & Plan  Start Lokelma  Repeat labs in 6 hours    Respiratory failure (HCC)  Assessment & Plan  CXR worsening severe bilat diffuse airspace disease, small right eff  Continue empiric Zyvox and Cefepime  IS and RT protocol  Check F/U labs in AM    Borderline abnormal TFTs  Assessment & Plan  TSH 8.39 and FT4 1.33  Suspect subclinical hypothyroid  Outpt F/U    BKA stump complication (HCC)- (present on admission)  Assessment & Plan  Recurrent infxn S/P 2nd BKA revision on 12/16/23 by Dr. Olivares  On Cefepime through 1/23/24 per ID  WBC and PCT both improving  Wound care and pain control per Physiatry    Acquired planovalgus deformity of right foot- (present on admission)  Assessment & Plan  S/P scheduled ORIF on 12/6/23 by Dr. Pryor  Wound care and pain control per Physiatry    Anemia- (present on admission)  Assessment & Plan  Has normocytic indices  Fe 21, may start supplement if no contraindications  Follow H/H    Hx of gastric bypass- (present on admission)  Assessment & Plan  History noted    Acute kidney injury (HCC)- (present on admission)  Assessment & Plan  Discontinue Lasix  Request FENa and U/S  Closely follow renal function    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)- (present on admission)  Assessment & Plan  Echo EF 45%, RVSP 66 mmHg, LAE, severe AS/MR  On Farxiga and Lasix    Vitamin D deficiency- (present on admission)  Assessment & Plan  Vit D level 17  Continue supplementation    Hyponatremia- (present on admission)  Assessment & Plan  Will further increase NaCl  Check F/U labs in AM    Chronic use of opiate drug for therapeutic purpose- (present on admission)  Assessment & Plan  On MS Contin  Pain management per Physiatry    Atrial fibrillation with RVR  ([facility])- (present on admission)  Assessment & Plan  Echo EF 45%, RVSP 66 mmHg, LAE, severe AS/MR  On no rate controlling agents  Monitor for tachydysrhythmias  Anticoagulated on Eliquis    Aortic stenosis due to bicuspid aortic valve- (present on admission)  Assessment & Plan  Echo EF 45, RVSP 66, lae, severe as, severe mr  Plan for TAVR next month per Cardiolgy consultation at [facility]    Rheumatoid arthritis ([facility])- (present on admission)  Assessment & Plan  On Humira as outpt  Rheumatology F/U    Full Code     Reviewed w/ Charge RN (Melva) and Dr. Khan

## 2023-12-27 PROBLEM — I33.0 AORTIC VALVE ABSCESS: Status: ACTIVE | Noted: 2023-01-01

## 2023-12-27 NOTE — ASSESSMENT & PLAN NOTE
Noted on MIA 12/27.  12/27-consulted Dr. Fernandez, thoracic surgeon, his note 12/28 states palliative care vs transfer to a higher level of care facility for post op cardiac support  High risk surgical patient with worsening renal function and mentation.  I have discussed with the wife and will have further discussion on Comfort Care vs Transfer to a facility if he would be accepted  I will reconsult infectious disease based on thoracic surgeries decision

## 2023-12-27 NOTE — CONSULTS
REFERRING PHYSICIAN: You Mcmahon MD.     CONSULTING PHYSICIAN: Russell Fernandez DO.    CHIEF COMPLAINT: Encephalopathy    HISTORY OF PRESENT ILLNESS: The patient is a 48 y.o. male with past medical history of alcohol abuse, atrial fibrillation, heart failure with bicuspid AoV with AS and AI, reduced ejection fraction, MONICA, gastric bypass, chronic pain on opiates, osteomyelitis status post BKA Spring 2023, and severe RA on Humira who presented on 12/26/23 with encephalopathy. Patient had infection of BKA and MSSA in October requiring revision. In December he was admitted for orthopedic surgeries on the RLE related to RA. Patient was being worked up for a TAVR by cardiology and was planning to have the procedure after ID clearance. He was admitted for encephalopathy, KATERYNA, and hyperkalemia. Transesophageal echocardiogram reveals endocarditis. {Patient opens eyes to voice, but appears obtunded.     PAST MEDICAL HISTORY:   Past Medical History:   Diagnosis Date    ADD (attention deficit disorder)     Alcohol abuse     Allergic rhinitis 05/21/2009    Anemia 09/2019    Anxiety 05/21/2009    Apnea, sleep     Arrhythmia     afib when hospitaized for infection    Back pain 05/21/2009    Bipolar disorder (HCC)     Bleeding ulcer 05/21/2009    Bleeding ulcer 05/21/2009    History of anemia-now on nexium     Bowel habit changes     constipation related to narcotics    Breath shortness     Congenital anomaly of mitral valve     Patient reports he only has 3 heart valves    Congestive heart failure (HCC)     Daytime sleepiness     Dental disorder     Missing teeth    Depression 05/21/2009    Eczema 05/21/2009    GERD (gastroesophageal reflux disease)     Heart burn     on meds    Heart murmur     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    Hemorrhagic disorder (HCC)     hx of bleeding ulcers    History of bleeding ulcers 02/12/2015    Hypertension 05/03/2021 12/7/23:  Patient states he has Never had high blood pressure. pt  states well controlled on meds    Insomnia 05/21/2009    Intestinal hernia     Migraine 05/21/2009    Morning headache     Obesity, morbid ([facility]) 05/21/2009    Osteomyelitis ([facility]) 10/07/2019    Pain 09/2019    Chronic knee and back pain    Pneumonia     12/4/23:  Last time was around 1 year ago.    Pubic ramus fracture ([facility]) 09/28/2019    Rheumatoid arteritis ([facility]) 09/2019    knee, feet right hand    Sleep apnea 05/21/2009    Did not tolerate cpap, does not use it12/4/23:  Resolved with weight loss.    Snoring        PAST SURGICAL HISTORY:   Past Surgical History:   Procedure Laterality Date    KNEE AMPUTATION BELOW Left 12/16/2023    Procedure: debridement, secondary closure of left revision amputation;  Surgeon: You Olivares M.D.;  Location: [facility];  Service: Orthopedics    ORIF, ANKLE Right 12/6/2023    Procedure: RIGHT SUBTALAR DISLOCATION OPEN REDUCTION INTERNAL FIXATION, TALONAVICULAR OPEN REDUCTION INTERNAL FIXATION, ACHILLES TENDON LENGTHENING, TRIPLE ARTHRODESIS, RIGHT LOWER EXTREMITY APPLICATION OF MULTIPLANAR EXTERNAL FIXATOR;  Surgeon: Calvin Pryor M.D.;  Location: [facility];  Service: Orthopedics    INCISION AND DRAINAGE ORTHOPEDIC Left 10/22/2023    Procedure: REVISION, BELOW KNEE AMPUTATION;  Surgeon: River Colindres M.D.;  Location: [facility];  Service: Orthopedics    IRRIGATION & DEBRIDEMENT GENERAL Left 10/19/2023    Procedure: IRRIGATION AND DEBRIDEMENT, WOUND;  Surgeon: Jay Roe M.D.;  Location: [facility];  Service: Trauma    KNEE AMPUTATION BELOW Left 03/09/2023    Procedure: AMPUTATION, BELOW KNEE;  Surgeon: Wayne Chambers M.D.;  Location: [facility];  Service: Orthopedics    PB TOTAL KNEE ARTHROPLASTY Right 07/22/2020    Procedure: ARTHROPLASTY, KNEE, TOTAL;  Surgeon: Temo Pires M.D.;  Location: [facility];  Service: Orthopedics    TENDON TRANSFER Right 01/22/2020    Procedure: RIGHT DISTAL  ULNA RESECTION AND EXTENSOR TENDON TRANSFER;  Surgeon: Marine Rushing M.D.;  Location: SURGERY [facility];  Service: Orthopedics    OTHER ORTHOPEDIC SURGERY Right 2020    total right knee replacement    IRRIGATION & DEBRIDEMENT ORTHO Left 10/09/2019    Procedure: IRRIGATION AND DEBRIDEMENT, WOUND - PELVIC OSTEOMYELITIS;  Surgeon: You Olivares M.D.;  Location: SURGERY [facility];  Service: Orthopedics    OTHER SURGICAL PROCEDURE  2019    osteomelitis of pelvis cleaned    GASTRIC BYPASS LAPAROSCOPIC      Lucila en y    CHOLECYSTECTOMY      OTHER SURGICAL PROCEDURE      repair of broken penis.  23:  Patient denies any surgery on penis.       FAMILY HISTORY:   Family History   Problem Relation Age of Onset    Hypertension Mother     Arthritis Mother     Depression Mother     Cancer Father         bladder    Schizophrenia Father     Cancer Maternal Grandmother         breast    Heart Disease Maternal Grandmother     Psychiatric Illness Other     Stroke Maternal Aunt     Other Neg Hx         no connective tissue disorders or known aortic disease        SOCIAL HISTORY:   Social History     Socioeconomic History    Marital status:      Spouse name: Not on file    Number of children: Not on file    Years of education: Not on file    Highest education level: Bachelor's degree (e.g., BA, AB, BS)   Occupational History    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  23:  Patient declines smoking cessation information at this time.    Vaping Use    Vaping Use: Never used   Substance and Sexual Activity    Alcohol use: No     Alcohol/week: 0.0 oz     Comment: quit 2011 ago- past ETOH abuse    Drug use: Never    Sexual activity: Yes      Partners: Female     Comment: ;    Other Topics Concern     Service No    Blood Transfusions Yes    Caffeine Concern No    Occupational Exposure No    Hobby Hazards No    Sleep Concern No    Stress Concern Yes    Weight Concern Yes    Special Diet No    Back Care No    Exercise No    Bike Helmet No    Seat Belt Yes    Self-Exams Yes   Social History Narrative    , lives in [place]     Social Determinants of Health     Financial Resource Strain: Low Risk  (9/7/2023)    Overall Financial Resource Strain (CARDIA)     Difficulty of Paying Living Expenses: Not very hard   Food Insecurity: No Food Insecurity (9/7/2023)    Hunger Vital Sign     Worried About Running Out of Food in the Last Year: Never true     Ran Out of Food in the Last Year: Never true   Transportation Needs: No Transportation Needs (12/26/2023)    PRAPARE - Transportation     Lack of Transportation (Medical): No     Lack of Transportation (Non-Medical): No   Recent Concern: Transportation Needs - Unmet Transportation Needs (10/30/2023)    PRAPARE - Transportation     Lack of Transportation (Medical): Yes     Lack of Transportation (Non-Medical): Yes   Physical Activity: Sufficiently Active (9/7/2023)    Exercise Vital Sign     Days of Exercise per Week: 7 days     Minutes of Exercise per Session: 30 min   Stress: Stress Concern Present (9/7/2023)    [employer] [place] [employer] - Occupational Stress Questionnaire     Feeling of Stress : Rather much   Social Connections: Socially Integrated (9/7/2023)    Social Connection and Isolation Panel [NHANES]     Frequency of Communication with Friends and Family: More than three times a week     Frequency of Social Gatherings with Friends and Family: Twice a week     Attends Hinduism Services: More than 4 times per year     Active Member of Clubs or Organizations: Yes     Attends Club or Organization Meetings: More than 4 times per year     Marital Status:    Intimate  "Partner Violence: Not At Risk (9/7/2023)    Humiliation, Afraid, Rape, and Kick questionnaire     Fear of Current or Ex-Partner: No     Emotionally Abused: No     Physically Abused: No     Sexually Abused: No   Housing Stability: Low Risk  (9/7/2023)    Housing Stability Vital Sign     Unable to Pay for Housing in the Last Year: No     Number of Places Lived in the Last Year: 1     Unstable Housing in the Last Year: No       ALLERGIES:   Allergies   Allergen Reactions    Nsaids      Bleeding, \"I had a bleeding ulcer\"    Diphenhydramine Unspecified     \"I get agitated\"    Mirtazapine Unspecified     I had a hard time waking up, lacking mental focus.     Penicillins      \"Had some dizziness\"  Tolerated Unasyn 10/2019  Tolerated Zosyn 02/2023    Promethazine Unspecified     \"I get very agitated\"        CURRENT MEDICATIONS:     Current Facility-Administered Medications:     acetaminophen (Tylenol) tablet 1,000 mg, 1,000 mg, Enteral Tube, Q8HRS, Issac Ortega M.D.    oxyCODONE immediate-release (Roxicodone) tablet 5 mg, 5 mg, Enteral Tube, Q3HRS PRN **OR** oxyCODONE immediate release (Roxicodone) tablet 10 mg, 10 mg, Enteral Tube, Q3HRS PRN **OR** HYDROmorphone (Dilaudid) injection 0.5 mg, 0.5 mg, Intravenous, Q3HRS PRN, Issac Ortega M.D., 0.5 mg at 12/27/23 0734    acetaminophen (Tylenol) tablet 650 mg, 650 mg, Enteral Tube, Q6HRS PRN, Issac Ortega M.D.    senna-docusate (Pericolace Or Senokot S) 8.6-50 MG per tablet 2 Tablet, 2 Tablet, Enteral Tube, BID **AND** polyethylene glycol/lytes (Miralax) Packet 1 Packet, 1 Packet, Enteral Tube, QDAY PRN **AND** magnesium hydroxide (Milk Of Magnesia) suspension 30 mL, 30 mL, Enteral Tube, QDAY PRN **AND** bisacodyl (Dulcolax) suppository 10 mg, 10 mg, Rectal, QDAY PRN, Issac Ortega M.D.    DULoxetine (Cymbalta) capsule 60 mg, 60 mg, Enteral Tube, DAILY, Issac Ortega M.D.    sodium zirconium cyclosilicate (Lokelma) packet 10 g, 10 g, Enteral Tube, DAILY " AT NOON, Issac Ortega M.D.    lansoprazole (Prevacid) solutab 30 mg, 30 mg, Enteral Tube, DAILY, Issac Ortega M.D.    propofol (DIPRIVAN) 20mL vial injection (RWN), 200 mg, Intravenous, Once, Mally Rahman D.O.    Notify provider if pain remains uncontrolled, , , CONTINUOUS **AND** Use the Numeric Rating Scale (NRS), Sy-Baker Faces (WBF), or FLACC on regular floors and Critical-Care Pain Observation Tool (CPOT) on ICUs/Trauma to assess pain, , , CONTINUOUS **AND** Pulse Ox, , , CONTINUOUS **AND** Pharmacy Consult Request ...Pain Management Review 1 Each, 1 Each, Other, PHARMACY TO DOSE **AND** If patient difficult to arouse and/or has respiratory depression (respiratory rate of 10 or less), stop any opiates that are currently infusing and call a Rapid Response., , , CONTINUOUS, Issac Ortega M.D.    apixaban (Eliquis) tablet 5 mg, 5 mg, Enteral Tube, BID, Issac Ortega M.D.    thiamine (B-1) IVPB 100 mg in 100 mL D5W (premix), 100 mg, Intravenous, DAILY, Issac Ortega M.D., Stopped at 12/27/23 0559    LR (Bolus) infusion 500 mL, 500 mL, Intravenous, Once PRN, Issac Ortega M.D.    Pharmacy Consult: Enteral tube insertion - review meds/change route/product selection, 1 Each, Other, PHARMACY TO DOSE, Issac Ortega M.D.    [COMPLETED] piperacillin-tazobactam (Zosyn) 4.5 g in  mL IVPB, 4.5 g, Intravenous, Once, Stopped at 12/26/23 2131 **AND** piperacillin-tazobactam (Zosyn) 4.5 g in  mL IVPB, 4.5 g, Intravenous, Q8HRS, Inocencia Pearl M.D., Last Rate: 25 mL/hr at 12/27/23 0726, 4.5 g at 12/27/23 9302    furosemide (Lasix) injection 80 mg, 80 mg, Intravenous, BID DIURETIC, Inocencia Paerl M.D., 80 mg at 12/27/23 3424    Facility-Administered Medications Ordered in Other Encounters:     [MAR Hold - Suspended Admission] sodium zirconium cyclosilicate (Lokelma) packet 10 g, 10 g, Oral, TWICE DAILY, Noe Rivera M.D.    [MAR Hold - Suspended Admission] morphine (MS IR)  tablet 15 mg, 15 mg, Oral, Q4HRS, Keaton Pisano M.D.    [MAR Hold - Suspended Admission] ipratropium-albuterol (DUONEB) nebulizer solution, 3 mL, Nebulization, TID, SIDDHARTH SykesO., 3 mL at 12/26/23 0747    [MAR Hold - Suspended Admission] linezolid (Zyvox) tablet 600 mg, 600 mg, Oral, Q12HRS, Iris Collins M.D., 600 mg at 12/26/23 0836    [MAR Hold - Suspended Admission] oxyCODONE immediate release (Roxicodone) tablet 10 mg, 10 mg, Oral, Q4HRS PRN, SIDDHARTH SykesO.    [MAR Hold - Suspended Admission] vitamin D3 (Cholecalciferol) tablet 2,000 Units, 2,000 Units, Oral, DAILY, Iris Collins M.D., 2,000 Units at 12/26/23 0835    [MAR Hold - Suspended Admission] Respiratory Therapy Consult, , Nebulization, Continuous RT, GINA Sykes.O.    [MAR Hold - Suspended Admission] Pharmacy Consult Request ...Pain Management Review 1 Each, 1 Each, Other, PHARMACY TO DOSE, GINA Sykes.BERONICA.    [MAR Hold - Suspended Admission] acetaminophen (Tylenol) tablet 1,000 mg, 1,000 mg, Oral, TID, 1,000 mg at 12/26/23 0836 **FOLLOWED BY** [MAR Hold - Suspended Admission] acetaminophen (Tylenol) tablet 1,000 mg, 1,000 mg, Oral, Q6HRS PRN, Alexx Khan D.O.    [MAR Hold - Suspended Admission] hydrALAZINE (Apresoline) tablet 25 mg, 25 mg, Oral, Q8HRS PRN, GINA Sykes.O.    [MAR Hold - Suspended Admission] acetaminophen (Tylenol) tablet 650 mg, 650 mg, Oral, Q4HRS PRN, GINA Sykes.O.    [MAR Hold - Suspended Admission] carboxymethylcellulose (Refresh Tears) 0.5 % ophthalmic drops 1 Drop, 1 Drop, Both Eyes, PRN, GINA Sykes.O.    [MAR Hold - Suspended Admission] benzocaine-menthol (Cepacol) lozenge 1 Lozenge, 1 Lozenge, Mouth/Throat, Q2HRS PRN, Alexx Khan, D.O.    [MAR Hold - Suspended Admission] mag hydrox-al hydrox-simeth (Maalox Plus Es Or Mylanta Ds) suspension 20 mL, 20 mL, Oral, Q2HRS PRN, Alexx Khan, D.O.    [MAR Hold - Suspended Admission] ondansetron (Zofran ODT)  dispertab 4 mg, 4 mg, Oral, 4X/DAY PRN, 4 mg at 12/23/23 0849 **OR** [MAR Hold - Suspended Admission] ondansetron (Zofran) syringe/vial injection 4 mg, 4 mg, Intramuscular, 4X/DAY PRN, Alexx Khan, D.O.    [MAR Hold - Suspended Admission] traZODone (Desyrel) tablet 50 mg, 50 mg, Oral, QHS PRN, Alexx Khan D.O.    [MAR Hold - Suspended Admission] sodium chloride (Ocean) 0.65 % nasal spray 2 Spray, 2 Spray, Nasal, PRN, Alexx Khan D.O.    [MAR Hold - Suspended Admission] therapeutic multivitamin-minerals (Theragran-M) tablet 1 Tablet, 1 Tablet, Oral, DAILY WITH LUNCH, Alexx Khan D.O., 1 Tablet at 12/25/23 1059    [MAR Hold - Suspended Admission] apixaban (Eliquis) tablet 5 mg, 5 mg, Oral, BID, Alexx Khan D.O., 5 mg at 12/26/23 0836    [MAR Hold - Suspended Admission] cefepime (Maxipime) 2 g in  mL IVPB, 2 g, Intravenous, Q8HRS, Alexx Khna D.O., Stopped at 12/26/23 0552    [MAR Hold - Suspended Admission] dapagliflozin propanediol (Farxiga) tablet 10 mg, 10 mg, Oral, DAILY, Alexx Khan D.O., 10 mg at 12/26/23 0836    [MAR Hold - Suspended Admission] DULoxetine (Cymbalta) capsule 60 mg, 60 mg, Oral, DAILY, Alexx Khan, D.O., 60 mg at 12/26/23 0836    [MAR Hold - Suspended Admission] lactulose 20 GM/30ML solution 30 mL, 30 mL, Oral, TID, Alexx Khan D.O., 30 mL at 12/26/23 0837    [MAR Hold - Suspended Admission] miconazole (Micotin) 2 % cream, , Topical, BID, Alexx Khan D.O., Given at 12/25/23 2100    [MAR Hold - Suspended Admission] morphine ER (Ms Contin) tablet 15 mg, 15 mg, Oral, Q12HRS, Alexx Khan D.O., 15 mg at 12/25/23 2206    [MAR Hold - Suspended Admission] omeprazole (PriLOSEC) capsule 20 mg, 20 mg, Oral, DAILY, Alexx Khan D.O., 20 mg at 12/26/23 0836    [MAR Hold - Suspended Admission] senna-docusate (Pericolace Or Senokot S) 8.6-50 MG per tablet 2 Tablet, 2 Tablet, Oral, BID, 2 Tablet at 12/26/23 0836 **AND** [MAR Hold -  Suspended Admission] polyethylene glycol/lytes (Miralax) Packet 1 Packet, 1 Packet, Oral, QDAY PRN **AND** [MAR Hold - Suspended Admission] magnesium hydroxide (Milk Of Magnesia) suspension 30 mL, 30 mL, Oral, QDAY PRN, 30 mL at 12/26/23 0529 **AND** [MAR Hold - Suspended Admission] bisacodyl (Dulcolax) suppository 10 mg, 10 mg, Rectal, QDAY PRN, Alexx Khan D.BERONICA.    [MAR Hold - Suspended Admission] albuterol (Proventil) 2.5mg/3ml nebulizer solution 2.5 mg, 2.5 mg, Nebulization, Q4H PRN (RT), Alexx Khan D.O., 2.5 mg at 12/24/23 0639    [MAR Hold - Suspended Admission] normal saline (PF) 20 mL, 20 mL, Intravenous, Q12HRS, Keaton Pisano M.D., 20 mL at 12/26/23 1150     LABS REVIEWED:  Lab Results   Component Value Date/Time    SODIUM 131 (L) 12/27/2023 02:45 AM    POTASSIUM 5.6 (H) 12/27/2023 02:45 AM    CHLORIDE 99 12/27/2023 02:45 AM    CO2 18 (L) 12/27/2023 02:45 AM    GLUCOSE 86 12/27/2023 02:45 AM    BUN 50 (H) 12/27/2023 02:45 AM    CREATININE 2.52 (H) 12/27/2023 02:45 AM    CREATININE 0.95 10/19/2010 12:05 PM    BUNCREATRAT 9 10/19/2010 12:05 PM    GLOMRATE >59 10/19/2010 12:05 PM      Lab Results   Component Value Date/Time    PROTHROMBTM 37.1 (H) 12/26/2023 02:05 PM    INR 3.68 (H) 12/26/2023 02:05 PM      Lab Results   Component Value Date/Time    WBC 8.3 12/27/2023 02:45 AM    WBC 8.4 10/19/2010 12:05 PM    RBC 2.90 (L) 12/27/2023 02:45 AM    RBC 4.96 10/19/2010 12:05 PM    HEMOGLOBIN 7.6 (L) 12/27/2023 02:45 AM    HEMATOCRIT 25.5 (L) 12/27/2023 02:45 AM    MCV 87.9 12/27/2023 02:45 AM    MCV 85 10/19/2010 12:05 PM    MCH 26.2 (L) 12/27/2023 02:45 AM    MCH 28.4 10/19/2010 12:05 PM    MCHC 29.8 (L) 12/27/2023 02:45 AM    MPV 10.2 12/27/2023 02:45 AM    NEUTSPOLYS 82.90 (H) 12/27/2023 02:45 AM    LYMPHOCYTES 11.30 (L) 12/27/2023 02:45 AM    MONOCYTES 4.40 12/27/2023 02:45 AM    EOSINOPHILS 0.40 12/27/2023 02:45 AM    BASOPHILS 0.20 12/27/2023 02:45 AM    HYPOCHROMIA 1+ 10/25/2019 04:00  AM    ANISOCYTOSIS 1+ 12/23/2023 05:25 AM        IMAGING REVIEWED AND INTERPRETED:    ECHOCARDIOGRAM:   TTE 12/9/23 [facility]  CONCLUSIONS  The left ventricle is mildly dilated. The left ventricular ejection   fraction is visually estimated to be 45%. Mildly reduced left   ventricular systolic function.  Normal right ventricular size and systolic function.  Moderately dilated left atrium  Aortic valve not well viauzlied; possibly bicuspid aortic valve.  Severe aortic valve stenosis. Moderate to severe aortic insufficiency.  Moderate to severe mitral regurgitation.  Mild tricuspid regurgitation.   Right atrial pressure is estimated to be 15 mmHg. Estimated right   ventricular systolic pressure is 66 mmHg; severely elevated.  Normal pericardium without effusion.  The ascending aorta is moderately dilated with a diameter of 4.5 cm.  Compared to the prior study on 10/20/23, now severe aortic valve   stenosis, mildly reduced left ventricular systolic function, moderate   to severe mitral and aortic regurgitation, severely estimated RVSP   elevation, moderately dilated ascending aorta.    MIA 12/27/23 [facility]:    Heavily calcified bicuspid aortic valve with severe aortic   insufficiency and evidence of perivalvular abscess which also appears   to be calcified.  No other valvular vegetations noted  No DELORES thrombus  No pericardial effusion  Aortic atherosclerosis noted  Primary team updated, recommend CTS consultation.         ANGIOGRAM:   12/20/23 [facility]  IMPRESSION:  1.  Normal-appearing epicardial coronary arteries  2.  Normal-appearing bilateral iliac and common femoral arteries  3.  Severe aortic valve insufficiency  4.  Dilated aortic root and ascending aorta, at least 4cm    REVIEW OF SYSTEMS:   Review of Systems   Unable to perform ROS: Mental acuity         PHYSICAL EXAMINATION:   Physical Exam  Constitutional:       General: He is not in acute distress.     Appearance: Normal appearance. He is ill-appearing.   HENT:      Head:  "Normocephalic and atraumatic.      Nose: Nose normal.   Eyes:      Conjunctiva/sclera: Conjunctivae normal.      Pupils: Pupils are equal, round, and reactive to light.   Neck:      Vascular: No JVD.      Trachea: No tracheal deviation.   Cardiovascular:      Rate and Rhythm: Normal rate and regular rhythm.      Heart sounds: Murmur heard.   Pulmonary:      Effort: Pulmonary effort is normal. No respiratory distress.      Breath sounds: Normal breath sounds. No stridor.   Abdominal:      General: Bowel sounds are normal. There is no distension.      Palpations: Abdomen is soft.      Tenderness: There is no abdominal tenderness.   Musculoskeletal:         General: No tenderness.      Cervical back: Normal range of motion and neck supple.      Comments: S/p BKA   Skin:     General: Skin is warm and dry.   Neurological:      General: No focal deficit present.      Mental Status: He is alert and oriented to person, place, and time.      Coordination: Coordination normal.      Gait: Gait is intact.   Psychiatric:         Mood and Affect: Mood and affect normal.         Behavior: Behavior normal.         Cognition and Memory: Memory normal.       /57   Pulse 85   Temp 36.3 °C (97.4 °F) (Temporal)   Resp 20   Ht 1.803 m (5' 11\")   Wt 94.5 kg (208 lb 5.4 oz)   SpO2 97%   BMI 29.06 kg/m²        IMPRESSION:  Bacterial endocarditis, aortic valve abscess, pulmonary embolism, right bundle branch block, chronic heart failure with reduced ejection fraction, acute renal failure, acute respiratory failure with hypoxia, status post BKA, osteomyelitis of BKA, sepsis    PLAN:  I recommend evaluation for palliative care versus transfer to a higher level of care with that has mechanical support backup.  He is extremely ill and the complexity of the surgery required increases the risk that he will need mechanical circulatory support postoperatively.    The procedure, its risks, benefits, potential complications and " alternative treatments were discussed with the patient in detail including the risks should he decide not to undergo my recommended treatment. All of his questions were answered to his satisfaction and he is willing to proceed with the operation. The risks include death, stroke,  infection: to include a rare bacterial infection related to the use of the heart/lung machine, marti-operative myocardial infarction, dysrhythmias, diaphragmatic paralysis, chest wall paresthesia, tracheostomy, kidney or other organ failure, possible return to the operating room for bleeding, bleeding requiring transfusion with its attendant risks including AIDS or hepatitis, dehiscence of surgical incisions, respiratory complications including the need for prolonged ventilator support, Protamine or other drug reaction, peripheral neuropathy, loss of limb, and miscount of surgical items. The operative mortality risk is approximately 10%. The STS mortality risk score is 2.3% and the morbidity and mortality risk score is 23.9%. The scores were discussed with patient.    Findings and recommendations have been discussed with the patient’s cardiologist Dr. Barcenas.  Thank you for this very challenging consultation and participation in the patient’s care.  I will keep you apprised of all future developments.      Sincerely,     Russell Fernandez DO.        I,  Russell Fernandez DO performed a substantial portion of the EM visit face-to-face with the same patient on the same date of service with, MAGNUS Thomas. I was personally involved in reviewing and interpreting the films and conducted elements of the history and physical exam. I performed all of the medical decision making for the patient.

## 2023-12-27 NOTE — ASSESSMENT & PLAN NOTE
12/28 BUN: 56 <50, creatinine: 2.71 <2.52  Received contrast for CTA TAVR study 12/22.  Consulting [facility] 12/28  Monitor labs

## 2023-12-27 NOTE — ED NOTES
Med rec updated and complete. All[facility]ies reviewed.. [facility]  Last dose of eliquis 12/26/23. Pt is receiving cefepime 2 g every 8 hours and linezolid 600 mg bid.

## 2023-12-27 NOTE — PROCEDURES
Procedure note, Procedural Sedation:    Indication for the sedation was for transesophageal echocardiogram to rule out endocarditis.  Informed consent was obtained prior to the sedation.    The patient is NPO.  The patient has no prior history of complications with anesthesia and no family history of complications with anesthesia.   The patient's Malampati class is 1.  The patient has no apparent difficulty moving the neck or opening the mouth.    The patient was placed on cardiac monitoring and continuous pulse oximetry monitoring.  End tidal capnometry monitoring was utilized.  A nurse was continuously at the patient's bedside.  Respiratory therapy was at the bedside.  The patient was placed on supplemental oxygen.    The patient was given 7cc of propofol for sedation.  Adequate sedation was achieved.    There were no adverse events.  No apnea, hypoxia, hypotension, or aspiration.    The patient tolerated the procedure well with no apparent complications.    Total bedside time was 12 minutes.    Mally Rahman DO   Pulmonary and Critical Care

## 2023-12-27 NOTE — ASSESSMENT & PLAN NOTE
Incidental finding on his TAVR workup CT  Discussed with Dr. Stark, they are likely not acute  Patient is already anticoagulated.  Continue therapeutic anticoagulation

## 2023-12-27 NOTE — PROGRESS NOTES
Hospital Medicine Daily Progress Note    Date of Service  12/27/2023    Chief Complaint  Altered    Hospital Course  Richard Hubbard II is a 48 y.o. male with hx of obesity, s/p gastric bypass, rheumatoid arthritis, chronic narcotics for chronic pain, aortic stenosis/bicuspid aortic valve w/ mod pul HTN, rheumatoid arthritis on Humira.  He has had a complex past year with most of it being in the hospital or in acute rehab.  Richard was admitted 12/26/2023 with acute encephalopathy repeated names but unable to answer questions.  He was noted to have KATERYNA and hyperkalemia and requiring 5 L supplemental oxygen.    Interval Problem Update  12/27: K:5.6, BUN:50, Cr:2.52 patient seen today after being sedated with Dilaudid.  He was quite lethargic.  I did call and update the wife on the findings of the transesophageal echocardiogram which showed a concern of an aortic abscess.  I then consulted thoracic surgeon.  Unable to have NG tube placed successfully today per RN.    I have discussed this patient's plan of care and discharge plan at IDT rounds today with Case Management, Nursing, Nursing leadership, and other members of the IDT team.      Code Status  Full Code    Disposition  The patient is not medically cleared for discharge to home or a post-acute facility.      I have placed the appropriate orders for post-discharge needs.    Review of Systems  Review of Systems   Unable to perform ROS: Mental acuity        Physical Exam  Temp:  [36.2 °C (97.2 °F)-36.6 °C (97.9 °F)] 36.4 °C (97.5 °F)  Pulse:  [82-90] 88  Resp:  [12-30] 20  BP: ()/(51-60) 100/51  SpO2:  [92 %-99 %] 92 %    Physical Exam  Constitutional:       Appearance: He is obese. He is ill-appearing.      Interventions: He is sedated.   HENT:      Head: Normocephalic and atraumatic.      Nose: Nose normal.      Mouth/Throat:      Mouth: Mucous membranes are moist.   Eyes:      General:         Right eye: No discharge.         Left eye: No discharge.       Extraocular Movements: Extraocular movements intact.      Conjunctiva/sclera: Conjunctivae normal.   Cardiovascular:      Rate and Rhythm: Normal rate and regular rhythm.      Heart sounds: Murmur heard.   Pulmonary:      Effort: Pulmonary effort is normal. No respiratory distress.      Breath sounds: Normal breath sounds. No wheezing.   Abdominal:      General: Bowel sounds are normal. There is no distension.      Palpations: Abdomen is soft.   Musculoskeletal:      Cervical back: Neck supple. No tenderness.      Right lower leg: Edema present.      Left lower leg: Edema present.      Comments: Left BKA with stump wrapped in ACE bandages. Right lower leg with ortho hardware stabilizing prior ortho surgery.   Lymphadenopathy:      Cervical: No cervical adenopathy.   Skin:     General: Skin is dry.   Neurological:      Mental Status: He is lethargic and disoriented.   Psychiatric:         Attention and Perception: He is inattentive.         Speech: Speech is slurred.         Behavior: Behavior is slowed.         Cognition and Memory: Cognition is impaired.         Fluids    Intake/Output Summary (Last 24 hours) at 12/27/2023 1535  Last data filed at 12/27/2023 1205  Gross per 24 hour   Intake 864.18 ml   Output 2300 ml   Net -1435.82 ml       Laboratory  Recent Labs     12/26/23  0602 12/26/23  1405 12/27/23  0245   WBC 10.9* 9.4 8.3   RBC 2.95* 2.83* 2.90*   HEMOGLOBIN 7.7* 7.7* 7.6*   HEMATOCRIT 26.1* 24.8* 25.5*   MCV 88.5 87.6 87.9   MCH 26.1* 27.2 26.2*   MCHC 29.5* 31.0* 29.8*   RDW 55.1* 55.1* 56.0*   PLATELETCT 255 261 241   MPV 10.1 10.0 10.2     Recent Labs     12/26/23  1405 12/26/23  2045 12/27/23  0245   SODIUM 129* 128* 131*   POTASSIUM 5.7* 5.6* 5.6*   CHLORIDE 97 98 99   CO2 17* 17* 18*   GLUCOSE 89 87 86   BUN 45* 49* 50*   CREATININE 2.37* 2.40* 2.52*   CALCIUM 8.0* 7.9* 7.8*     Recent Labs     12/26/23  1405   INR 3.68*               Imaging  EC-MIA W/O CONT         DX-TIBIA AND FIBULA RIGHT    Final Result      DX-TIBIA AND FIBULA LEFT   Final Result         No obvious destruction of the tibial and fibular stumps.      However, plain film is very insensitive to detect early osteomyelitis. If there is clinical concern, further evaluation with MRI is recommended.         DX-FOOT-2- RIGHT   Final Result      CT-RENAL COLIC EVALUATION(A/P W/O)   Final Result         Limited exam due to lack of IV contrast.      1. No definite evidence of inflammatory change in the abdomen or pelvis.  The study is however limited due to nonuse of intravenous contrast      2. Redemonstration of groundglass and airspace opacities throughout both lungs. Bilateral pleural effusions, right more than left.      3. Epigastric ventral hernia containing the transverse colon. No evidence of obstruction.      4. Diffuse anasarca.      CT-HEAD W/O   Final Result      Head CT without contrast within normal limits. No evidence of acute cerebral infarction, hemorrhage or mass lesion.         DX-CHEST-PORTABLE (1 VIEW)   Final Result         Diffuse groundglass and airspace opacities throughout both lungs, similar to prior.      Trace right pleural effusion, similar to prior.           Assessment/Plan  * Acute encephalopathy- (present on admission)  Assessment & Plan  Concern of infectious etiology with endocarditis  Continue antibiotics for now  Minimize sedation as able  Monitor neuro status    Aortic valve abscess  Assessment & Plan  Noted on MIA 12/27.  12/27-consulted Dr. Jane, thoracic surgeon  Await further input on decision for surgery versus antibiotic care  I will reconsult infectious disease based on thoracic surgeries decision    Chronic HFrEF (heart failure with reduced ejection fraction) ([facility])  Assessment & Plan  Noted,  Monitor fluid resuscitation and respiratory status    Acute renal failure (ARF) ([facility])- (present on admission)  Assessment & Plan  12/27 BUN: 50, creatinine: 2.52  Monitor labs    Acute respiratory failure  with hypoxia ([facility])- (present on admission)  Assessment & Plan  Septic pulmonary emboli, known chronic mild HFrEF, possible infection  Resp and O2 per protocol  Mobilize and encourage deep inspiratory efforts as able, minimize sedation as able  As above    Anemia- (present on admission)  Assessment & Plan  12/27 hemoglobin 7.6  Anemia likely of chronic disease  Monitor daily CBC    History of gastric bypass- (present on admission)  Assessment & Plan  High risk Wernicke's  Ordered IV thiamine and B1 levels    S/P BKA (below knee amputation), left ([facility])- (present on admission)  Assessment & Plan  Wound care.  PT/OT    History of osteomyelitis- (present on admission)  Assessment & Plan  Started cefepime  ID consulted    Sepsis ([facility])- (present on admission)  Assessment & Plan  This is Sepsis Present on admission  SIRS criteria identified on my evaluation include: Tachycardia, with heart rate greater than 90 BPM and Tachypnea, with respirations greater than 20 per minute  Clinical indicators of end organ dysfunction include Acute Renal Failure, with a finding of creatinine greater than 2 (patient does not have ESRD or CKD  Source is respiratory or urinary or skin/soft tissue  Sepsis protocol initiated  Crystalloid Fluid Administration: Resuscitation volume ordered. Reason that resuscitation volume of less than 30ml/kg was ordered concern for causing harm given CHF  IV antibiotics as appropriate for source of sepsis  Reassessment: I have reassessed the patient's hemodynamic status    Cefepime started  IV fluids less than 30m/kg given known oartic stenosis and mild reduction in EF  ID consulted    Severe aortic stenosis- (present on admission)  Assessment & Plan  Avoid hypotension or hypertension  Cardiology consulted.    Chronic narcotic use- (present on admission)  Assessment & Plan  Avoid narcotics for now, make it PRN  Narcan if needed currently patient is arousable    Rheumatoid arthritis ([facility])- (present on  admission)  Assessment & Plan  History of RA         VTE prophylaxis:    therapeutic anticoagulation with eliquis 5 mg BID      I have performed a physical exam and reviewed and updated ROS and Plan today (12/27/2023). In review of yesterday's note (12/26/2023), there are no changes except as documented above.

## 2023-12-27 NOTE — ASSESSMENT & PLAN NOTE
His chest imaging is quite concerning.  He had a small amount of GGO on his October CT chest.  His CXRs since that time have had progressive GGO and nodular changes, as well as effusion. He was bacteremic in October, but not known to be bacteremic since that time and was on appropriate antibiotics, so theoretically should not have septic pulmonary emboli.  If these are septic emboli, that would be concerning for an untreated bloodstream infection and/or right sided endocarditis. The differential includes pulmonary infarcts, but his PE burden is quite minimal so I think that's less likely. He has severe RA, and I'm unable to tell if he's been receiving his humira regularly throughout these hospital stays. While less likely, the abnormal chest CT could be lung injury from RA.  Certainly volume overload if worsening his respiratory status and chest imaging, but I don't believe that's the whole story.     -recommend diuresis  -recommend consulting pulmonology in the morning to discuss possible bronchoscopy to help further delineate the cause of his lung disease  -sending RA serology  -recommend a MIA to evaluate right sided endocarditis, which I have ordered  -blood cultures pending

## 2023-12-27 NOTE — ASSESSMENT & PLAN NOTE
Differential includes cefepime related encephalopathy, infection related, micronutrient deficiency given his gastric bypass (less likely with acute onset), opiate excess or decreased opiate clearance d/t his renal failure.  Mild uremia is likely contributing    -recommend switching from cefepime to a different antibiotic given the relatively high incidence of neurotoxicity with cefepime. Consider zosyn until cultures return from this admission  -agree with thiamine  -if encephalopathy is persistent, consider trace metal deficiency, etc  -reduce dose of opiates while altered, or avoid altogether although he may start withdrawing  -avoid other sedating agents  -delirium precautions  -TSH OK  -check cortisol, ammonia

## 2023-12-27 NOTE — CARE PLAN
The patient is Stable - Low risk of patient condition declining or worsening    Problem: Respiratory  Goal: Patient will achieve/maintain optimum respiratory ventilation and gas exchange  Outcome: Progressing  Note: Nurse monitoring pt resp status     Problem: Skin Integrity  Goal: Skin integrity is maintained or improved  Outcome: Progressing  Note: Pt on Q2 turns     Shift Goals  Clinical Goals: monitor vitals labs  Patient Goals: elias  Family Goals: ELIAS    Progress made toward(s) clinical / shift goals:      Patient is not progressing towards the following goals:

## 2023-12-27 NOTE — DIETARY
"Nutrition Support Assessment:  Day 1 of admit.  Richard Hubbard II is a 48 y.o. male with admitting DX of Confusion.     Current problem list:  Richard Hubbard II is a 48 y.o. male who presents in an altered state.  The patient has a complex history.  He is currently on antibiotics for pneumonia as well as osteomyelitis of the left lower extremity that is post below the knee amputation.  I also reviewed the patient's progress note from Dr. Galicia at the [facility] nursing [facility] from yesterday.  The patient seemed to be coherent at that time.  Is also noted the patient has a history of chronic diastolic heart failure as well as atrial fibrillation.  No other history could be obtained as the patient would not answer questions appropriately.   Acute respiratory failure with hypoxia ([facility])  Acute renal failure (ARF) ([facility])  Hyperkalemia  Chronic HFrEF (heart failure with reduced ejection fraction) ([facility])  History of gastric bypass ()  L BKA     Assessment:  Estimated Nutritional Needs based on:   Height: 180.3 cm (5' 11\")  Weight: 94.5 kg (208 lb 5.4 oz)  Weight to Use in Calculations: 94.5 kg (208 lb 5.4 oz) (Adjusted BKA weight is 100.4 kg, BMI 30.9 (Class I obesity).)  Ideal Body Weight: 73.4 kg (161 lb 14.4 oz) (Adjusted for BKA)    Calculation/Equation: REE per MSJ = 1844 kcal/day  Total Calories / day: 1800 - 2000 (Calories / k - 21)  Total Grams Protein / day: 88 - 110 (Grams Protein / kg IBW: 1.2 - 1.5)     Evaluation:   TF consult in the setting of AMS. Enteral access still pending.   Estimated needs adjusted based on adjusted BMI of >30.  Labs: Na 131, K+ 5.6, BUN 50, Cr 2.52, GFR 31, alb 2.7, pre-albumin 3.1, CRP 11.6  Meds include lasix, Abx, bowel meds, Lokelma, and thiamine.  Wounds noted.  No BM recorded.  Renal TF formula is most appropriate @ this time.     Recommendations/Plan:  Nepro with CARBSTEADY @ goal rate 45 ml/hr to provide 1912 kcal, 87 grams protein, and 785 ml free water per " day.  Can substitute Novasource Renal @ 40 ml/hr (1920 kcal, 87 grams protein, and 691 ml free water per day).    RD following.

## 2023-12-27 NOTE — ASSESSMENT & PLAN NOTE
Concern of infectious etiology with endocarditis  Continue antibiotics for now  Minimize sedation as able  Monitor neuro status  Holding MRI based on decision of Goals of care.

## 2023-12-27 NOTE — DISCHARGE PLANNING
was sent acute from [facility].  Will need a PMR consult referral as well as TAI alexander to cogitate a return once medically cleared.

## 2023-12-27 NOTE — PROGRESS NOTES
Wasted 13mL of prop post procedure with FAITH Espinoza. Per pharmacy Ivette, does not need to be wasted in [facility].

## 2023-12-27 NOTE — CARE PLAN
The patient is Watcher - Medium risk of patient condition declining or worsening    Shift Goals  Clinical Goals: ABX  Patient Goals: JI  Family Goals: JI    Progress made toward(s) clinical / shift goals:    Problem: Fall Risk  Goal: Patient will remain free from falls  Outcome: Progressing, pt remains free from falls.      Problem: Pain - Standard  Goal: Alleviation of pain or a reduction in pain to the patient’s comfort goal  Outcome: Progressing, pt remains calm.        Patient is not progressing towards the following goals:

## 2023-12-27 NOTE — PROGRESS NOTES
Asked to do MIA for endocarditis.    Consented his wife over the phone since patient's neuro status prevents him consenting. She understands indications, risks and benefits and agrees to proceed with the MIA.    Plan for MIA with Intensivist support - Dr. Rahman - for sedation around 9:30 AM. Updated bedside RN and Dr. Mcmahon.    Hermann Barcenas MD, MPH [facility]  Interventional Cardiologist  [facility]   of Clinical Internal Medicine - [facility]

## 2023-12-27 NOTE — ASSESSMENT & PLAN NOTE
Antibiotic/AIN related vs cardiorenal.  I think much less likely to be hypovolemic as he has significant JVD and his chest imaging is quite overloaded    -recommend diuresis  -consider nephrology consultation for further evaluation of possible AIN   -piedra for strict I&O  -CT KUB without hydro

## 2023-12-27 NOTE — ASSESSMENT & PLAN NOTE
Avoid hypotension or hypertension  Cardiology consulted.  There was initial plan of a TAVR but now with aortic valve abscess this will need transfer to higher level of care facility or palliative means.

## 2023-12-27 NOTE — ASSESSMENT & PLAN NOTE
Septic pulmonary emboli, known chronic mild HFrEF, possible infection  Resp and O2 per protocol  CXR stat 12/28 as breath sounds worse  Mobilize and encourage deep inspiratory efforts as able, minimize sedation as able  As above

## 2023-12-27 NOTE — ASSESSMENT & PLAN NOTE
Cefepime  IV fluids less than 30m/kg given known aortic stenosis and mild reduction in EF  ID consulting

## 2023-12-27 NOTE — ED NOTES
Bedside report to FAITH Frey. All questions answered. Pt transported off unit with RN on portable monitor. Pt awake and saturating above 90% on 5L NC at time of transfer.

## 2023-12-28 NOTE — WOUND TEAM
Wound consult received regarding patient BLE, sacrum, and groin. Patient seen and evaluated by wound RN on 12/26/23 at [facility]. Patient up for weekly follow up by wound team. Skin care orders in place for nursing. Please call wound team for questions or concerns.

## 2023-12-28 NOTE — ASSESSMENT & PLAN NOTE
Hold on anticoagulation as Septic emboli more likely  Prior U/S LE negative for DVT 12/11/23  Heparin DVT prophylaxis for now.  Unable to take oral and no NG access.  Will consult Pulmonary for further input.

## 2023-12-28 NOTE — CARE PLAN
The patient is Unstable - High likelihood or risk of patient condition declining or worsening    Shift Goals  Clinical Goals: Wean o2, abx  Patient Goals: JI  Family Goals: JI    Progress made toward(s) clinical / shift goals:    Problem: Fall Risk  Goal: Patient will remain free from falls  Outcome: Progressing, pt remains free from falls.      Problem: Pain - Standard  Goal: Alleviation of pain or a reduction in pain to the patient’s comfort goal  Outcome: Progressing, pt has no complaints of pain.        Patient is not progressing towards the following goals:

## 2023-12-28 NOTE — PROGRESS NOTES
I have updated the patient's wife.  Palliative measures vs transfer to a facility that could handle potential post valve surgical complications if needed.  His wife request transfer to the closest higher level facility if able.    I have reached out to our transfer center and initiated transfer to higher level of care.

## 2023-12-28 NOTE — PROGRESS NOTES
History and Physical Service   [facility] - INPATIENT    HISTORY AND PHYSICAL EXAMINATION            Date of Evaluation: 11/2/2021  Patient name:  Bambi Wilde  MRN:   [id number]  YOB: 1954  PCP:    Vani Lim MD    History Obtained From:     Patient, medical records     History of Present Illness: This is Bambi Wilde a 78 y.o. male who presents today for a diagnostic colonoscopy, possible APC hemorrhoidectomy by Dr. Colin Lloyd for colon screening, bleeding, change in bowel habits and hemorrhoids. The patient referred to Dr Colin Lloyd and evaluated for c/o change in bowel habits and hemorrhoids. He takes daily ducolax and stool softeners for daily BM' He noticed bleeding from hemorrhoids intermittently over the years. Dr Lora Zhao advised further diagnotic evaluation and arrived for his scheduled procedures. He completed the prep as directed until watery yellow clear. No family history of colon cancer or polyps. Previous colonoscopy. Patient  denies bloody tarry stools, diarrhea alternating with constipation, fever, chills, night sweats, pain or unexplained weight loss. Past Medical History:     Past Medical History:   Diagnosis Date    Bleeding hemorrhoid     Sarcoidosis         Past Surgical History:     Past Surgical History:   Procedure Laterality Date    COLONOSCOPY      JOINT REPLACEMENT Left 04/2021        Medications Prior to Admission:     Prior to Admission medications    Medication Sig Start Date End Date Taking? Authorizing Provider   ALPRAZolam Zuleyma Buggy) 0.5 MG tablet Take 0.5 mg by mouth nightly as needed.  9/26/21  Yes Historical Provider, MD   vitamin D3 (CHOLECALCIFEROL) 125 MCG (5000 UT) TABS tablet Take 5,000 Units by mouth daily   Yes Historical Provider, MD   fenofibrate (TRICOR) 145 MG tablet Take 145 mg by mouth daily 9/29/21  Yes Historical Provider, MD   levothyroxine (SYNTHROID) 75 MCG tablet Take 75 mcg by mouth daily 10/10/21  Yes Historical Provider, Notified MD Grant of patients increasing O2 needs. He has increased from 7L oxymask at the beginning of my shift to now requiring NRB to maintain sats >90%. Does not appear to be in respiratory distress, lung sounds unchanged from initial assessment. RT called to bedside to begin HHF, currently satting 97% on 40L 60%.    MD   mirabegron (MYRBETRIQ) 50 MG TB24 Take 50 mg by mouth daily 3/8/21  Yes Historical Provider, MD   NIFEdipine (PROCARDIA XL) 30 MG extended release tablet Take 30 mg by mouth daily 9/29/21  Yes Historical Provider, MD   tamsulosin (FLOMAX) 0.4 MG capsule Take 0.4 mg by mouth daily 10/13/21  Yes Historical Provider, MD   traZODone (DESYREL) 100 MG tablet Take 100 mg by mouth nightly 10/24/21  Yes Historical Provider, MD   Omega-3 Fatty Acids (FISH OIL) 1000 MG CPDR Take 3,000 mg by mouth   Yes Historical Provider, MD   bisacodyl (DULCOLAX) 5 MG EC tablet Take 5 mg by mouth nightly as needed for Constipation   Yes Historical Provider, MD   meloxicam (MOBIC) 15 MG tablet Take 15 mg by mouth daily 10/19/21   Historical Provider, MD   traMADol (ULTRAM) 50 MG tablet Take 50 mg by mouth nightly as needed for Pain. 9/29/21   Historical Provider, MD        Allergies:     Patient has no known allergies. Social History:     Tobacco:    has no history on file for tobacco use. Alcohol:      has no history on file for alcohol use. Drug Use:  has no history on file for drug use. Family History:     No family history on file. Review of Systems:     Positive and Negative as described in HPI. CONSTITUTIONAL:  negative for fevers, chills, sweats, fatigue, weight loss  HEENT:  negative for vision, hearing changes, runny nose, throat pain  RESPIRATORY: Hx Sarcoidosis  Follows with Dr Dar Espinoza  negative for shortness of breath, cough, congestion, wheezing. CARDIOVASCULAR: Follows with Dr Cem Yoon Had cardiac clearance with stress test and ECHO prior to joint replacement 4/2021 that were normal   negative for chest pain, palpitations.   GASTROINTESTINAL: See HPI  GENITOURINARY: Hx BPH on Flowmax Follows with Dr Felipe Forrest negative for difficulty of urination, burning with urination, frequency   INTEGUMENT:  negative for rash, skin lesions, easy bruising   HEMATOLOGIC/LYMPHATIC:  negative for swelling/edema ALLERGIC/IMMUNOLOGIC:  negative for urticaria , itching  ENDOCRINE: Hx thyroid dx controlled on medication and followed by PCP  negative increase in drinking, increase in urination, hot or cold intolerance  MUSCULOSKELETAL:  negative joint pains, muscle aches, swelling of joints  NEUROLOGICAL:  negative for headaches, dizziness, lightheadedness, numbness, pain, tingling extremities  BEHAVIOR/PSYCH:  negative for depression, anxiety    Physical Exam:   BP (!) 141/85   Pulse 81   Temp 97.3 °F (36.3 °C) (Temporal)   Resp 16   Ht 5' 7\" (1.702 m)   Wt 202 lb 8 oz (91.9 kg)   SpO2 98%   BMI 31.72 kg/m²     General Appearance:  alert, well appearing, and in no acute distress  Mental status: oriented to person, place, and time with normal affect  Head:  normocephalic, atraumatic. Eye: no icterus, redness, pupils equal and reactive, extraocular eye movements intact, conjunctiva clear  Ear: normal external ear, no discharge, hearing intact  Nose:  no drainage noted  Mouth: mucous membranes moist  Neck: supple, no carotid bruits, thyroid not palpable  Lungs: Bilateral equal air entry, unlabored at rest w/o use of accessory muscles, no wheezing, rales or rhonchi, normal effort No CVA tenderness  Cardiovascular: HR 81  normal rate, regular rhythm, no murmur, gallop, rub. Abdomen: Soft, nontender, nondistended, normal bowel sounds, no hepatomegaly or splenomegaly  Neurologic: There are no new focal motor or sensory deficits, normal muscle tone and bulk, no abnormal sensation, normal speech, cranial nerves II through XII grossly intact  Skin: No gross lesions, rashes, bruising or bleeding on exposed skin area  Extremities:  peripheral pulses palpable, no pedal edema or calf pain with palpation  Psych: normal affect     Investigations:      Laboratory Testing:  No results for input(s): COVID19 in the last 720 hours. No results for input(s): POCGLU in the last 72 hours.   No results found for this or any previous visit (from the past 24 hour(s)). No results for input(s): HGB, HCT, WBC, MCV, PLATELET, NA, K, CL, CO2, BUN, CREATININE, GLUCOSE, INR, PROTIME, APTT, AST, ALT, LABALBU, HCG in the last 720 hours. Imaging/Diagnostics:    Diagnosis:      1. Change in bowel habits, colon screening, bleeding and hemorrhoids    Plans:     1.  Diagnostic colonoscopy, possible APC treatment hemorrhoidectomy       JANA Britt CNP  11/2/2021  6:46 AM

## 2023-12-28 NOTE — DISCHARGE PLANNING
Case Management Discharge Planning    Admission Date: 12/26/2023  GMLOS: 5.1  ALOS: 2    6-Clicks ADL Score: 6  6-Clicks Mobility Score: 6      Anticipated Discharge Dispo: Discharge Disposition: D/T to short term gen hosp for  care (02)      Action(s) Taken: RNCM was notified that patient and family want to transfer for [facility]. Patient has [facility]. [facility] would be dedicated facility and would have to decline before other facilities could be attempted. Per RTOC, [facility] declined and are now trying [facility] in [place]. Patients spouse, Katiana was agreeable with [facility]. If [facility] declines they will continue to send referrals to facilities based on insurance.     Escalations Completed: None    Medically Clear: No    Next Steps: RNCM will continue to be available to assist with transfer to [facility] as needed.     Barriers to Discharge: Pending Placement and Transportation    Is the patient up for discharge tomorrow: No

## 2023-12-28 NOTE — CARE PLAN
The patient is Watcher - Medium risk of patient condition declining or worsening    Shift Goals  Clinical Goals: stable neuro status, abx  Patient Goals: JI  Family Goals: safety, comfort    Progress made toward(s) clinical / shift goals:    Problem: Hemodynamics  Goal: Patient's hemodynamics, fluid balance and neurologic status will be stable or improve  Outcome: Progressing     Problem: Pain - Standard  Goal: Alleviation of pain or a reduction in pain to the patient’s comfort goal  Outcome: Progressing       Patient is not progressing towards the following goals:      Problem: Knowledge Deficit - Standard  Goal: Patient and family/care givers will demonstrate understanding of plan of care, disease process/condition, diagnostic tests and medications  Outcome: Not Progressing  Note: Pt educated regarding plan of care and medications. All questions answered.      Problem: Respiratory  Goal: Patient will achieve/maintain optimum respiratory ventilation and gas exchange  Outcome: Not Progressing  Note: Lung sounds and respiratory effort assessed regularly. RT protocol in effect. Pt given breathing treatments and encouraged to cough PRN.       Problem: Fall Risk  Goal: Patient will remain free from falls  Outcome: Not Progressing  Note: Fall precautions in place. Bed in lowest position. Non-skid socks in place. Personal possessions within reach. Mobility sign on door. Bed-alarm on. Call light within reach. Pt educated regarding fall prevention.

## 2023-12-28 NOTE — PROGRESS NOTES
Hospital Medicine Daily Progress Note    Date of Service  12/28/2023    Chief Complaint  Altered    Hospital Course  Shola Hubbard II is a 48 y.o. male with hx of obesity, s/p gastric bypass, rheumatoid arthritis, chronic narcotics for chronic pain, aortic stenosis/bicuspid aortic valve w/ mod pul HTN, rheumatoid arthritis on Humira.  He has had a complex past year with most of it being in the hospital or in acute rehab.  Shola was admitted 12/26/2023 with acute encephalopathy repeated names but unable to answer questions.  He was noted to have KATERYNA and hyperkalemia and requiring 5 L supplemental oxygen.    Interval Problem Update  12/28: Hgb:7.3, Cr:2.71, I/O:364/3700. I am consulting [facility] Nephrology.  THoracic surgery recommending for palliative or transfer to higher level of care.  I initially discussed with the wife and she request transfer to another facility.  I was called later by Dr Cliff Mullins, a friend throught the patient's Yarsani, and he stated he had talked to Katiana and he felt it would be best served for Palliative/Comfort measures.  I discussed with Katiana and she along with her  have met with patient and he was asked if comfort measures or surgery and he wishes for surgery.  Sailaja and I discussed code status once again and he remains a full code.  Patient remains very lethargic and grossly ill.  The patient is critically ill with multi organ dysfunction (respiratory, renal, neurologically and cardiac) and in face of aortic valve abscess.  He was having increase respiratory failure and on HFNC with CXR showing worsening opacification and his work of breathing has worsened. After consulting pulmonary and critical care, myself, her  Dr Dae Wren, and myself spent 55 minutes at bedside and in conference discussing his options.  He is at a high risk of death despite intubation and unlikely to be a candidate for a higher level of care facility given his comorbidities and poor  physical condition.  Agreement for Comfort measure was made.  Patient opens eyes to voice but not nodding his head consistantly. I did discuss with the patient he was failing and that we would make him very comfortable with medications and not intubated him.  I did discuss in depth plan with RN that patient has had prior high tolerance of pain medication and may require higher doses for any air hunger.    Critical Care: 75 minutes critical time in face of respiratory failure, and multi organ failure.     12/27: K:5.6, BUN:50, Cr:2.52 patient seen today after being sedated with Dilaudid.  He was quite lethargic.  I did call and update the wife on the findings of the transesophageal echocardiogram which showed a concern of an aortic abscess.  I then consulted thoracic surgeon.  Unable to have NG tube placed successfully today per RN.    I have discussed this patient's plan of care and discharge plan at IDT rounds today with Case Management, Nursing, Nursing leadership, and other members of the IDT team.      Code Status  Full Code    Disposition  The patient is not medically cleared for discharge to home or a post-acute facility.      I have placed the appropriate orders for post-discharge needs.    Review of Systems  Review of Systems   Unable to perform ROS: Mental acuity        Physical Exam  Temp:  [36.4 °C (97.6 °F)-36.7 °C (98.1 °F)] 36.7 °C (98 °F)  Pulse:  [87-99] 99  Resp:  [14-37] 22  BP: ()/(50-56) 105/54  SpO2:  [83 %-96 %] 94 %    Physical Exam  Constitutional:       Appearance: He is obese. He is ill-appearing.      Interventions: He is sedated.   HENT:      Head: Normocephalic and atraumatic.      Nose: Nose normal.      Mouth/Throat:      Mouth: Mucous membranes are moist.   Eyes:      General:         Right eye: No discharge.         Left eye: No discharge.      Extraocular Movements: Extraocular movements intact.      Conjunctiva/sclera: Conjunctivae normal.   Cardiovascular:      Rate and  Rhythm: Normal rate and regular rhythm.      Heart sounds: Murmur heard.   Pulmonary:      Effort: Pulmonary effort is normal. No respiratory distress.      Breath sounds: Normal breath sounds. No wheezing.   Abdominal:      General: Bowel sounds are normal. There is no distension.      Palpations: Abdomen is soft.   Musculoskeletal:      Cervical back: Neck supple. No tenderness.      Right lower leg: Edema present.      Left lower leg: Edema present.      Comments: Left BKA with stump wrapped in ACE bandages. Right lower leg with ortho hardware stabilizing prior ortho surgery.   Lymphadenopathy:      Cervical: No cervical adenopathy.   Skin:     General: Skin is dry.   Neurological:      Mental Status: He is lethargic and disoriented.   Psychiatric:         Attention and Perception: He is inattentive.         Speech: Speech is slurred.         Behavior: Behavior is slowed.         Cognition and Memory: Cognition is impaired.         Fluids    Intake/Output Summary (Last 24 hours) at 12/28/2023 1557  Last data filed at 12/28/2023 0700  Gross per 24 hour   Intake 313.76 ml   Output 1400 ml   Net -1086.24 ml       Laboratory  Recent Labs     12/26/23  1405 12/27/23  0245 12/28/23  0235   WBC 9.4 8.3 8.0   RBC 2.83* 2.90* 2.79*   HEMOGLOBIN 7.7* 7.6* 7.3*   HEMATOCRIT 24.8* 25.5* 24.9*   MCV 87.6 87.9 89.2   MCH 27.2 26.2* 26.2*   MCHC 31.0* 29.8* 29.3*   RDW 55.1* 56.0* 57.6*   PLATELETCT 261 241 211   MPV 10.0 10.2 9.7     Recent Labs     12/26/23  2045 12/27/23  0245 12/28/23  0235   SODIUM 128* 131* 136   POTASSIUM 5.6* 5.6* 4.7   CHLORIDE 98 99 102   CO2 17* 18* 17*   GLUCOSE 87 86 81   BUN 49* 50* 56*   CREATININE 2.40* 2.52* 2.71*   CALCIUM 7.9* 7.8* 7.5*     Recent Labs     12/26/23  1405   INR 3.68*               Imaging  EC-MIA W/O CONT   Final Result      DX-TIBIA AND FIBULA RIGHT   Final Result      DX-TIBIA AND FIBULA LEFT   Final Result         No obvious destruction of the tibial and fibular stumps.       However, plain film is very insensitive to detect early osteomyelitis. If there is clinical concern, further evaluation with MRI is recommended.         DX-FOOT-2- RIGHT   Final Result      CT-RENAL COLIC EVALUATION(A/P W/O)   Final Result         Limited exam due to lack of IV contrast.      1. No definite evidence of inflammatory change in the abdomen or pelvis.  The study is however limited due to nonuse of intravenous contrast      2. Redemonstration of groundglass and airspace opacities throughout both lungs. Bilateral pleural effusions, right more than left.      3. Epigastric ventral hernia containing the transverse colon. No evidence of obstruction.      4. Diffuse anasarca.      CT-HEAD W/O   Final Result      Head CT without contrast within normal limits. No evidence of acute cerebral infarction, hemorrhage or mass lesion.         DX-CHEST-PORTABLE (1 VIEW)   Final Result         Diffuse groundglass and airspace opacities throughout both lungs, similar to prior.      Trace right pleural effusion, similar to prior.      DX-CHEST-LIMITED (1 VIEW)    (Results Pending)        Assessment/Plan  * Acute encephalopathy- (present on admission)  Assessment & Plan  Concern of infectious etiology with endocarditis  Continue antibiotics for now  Minimize sedation as able  Monitor neuro status  Holding MRI based on decision of Goals of care.    Aortic valve abscess  Assessment & Plan  Noted on MIA 12/27.  12/27-consulted Dr. Fernandez, thoracic surgeon, his note 12/28 states palliative care vs transfer to a higher level of care facility for post op cardiac support  High risk surgical patient with worsening renal function and mentation.  I have discussed with the wife and will have further discussion on Comfort Care vs Transfer to a facility if he would be accepted  I will reconsult infectious disease based on thoracic surgeries decision    Pulmonary embolism (HCC)  Assessment & Plan  Hold on anticoagulation as Septic  emboli more likely  Prior U/S LE negative for DVT 12/11/23  Heparin DVT prophylaxis for now.  Unable to take oral and no NG access.  Will consult Pulmonary for further input.    Chronic HFrEF (heart failure with reduced ejection fraction) ([facility])  Assessment & Plan  ON diuretics  Monitor fluid resuscitation and respiratory status    Acute renal failure (ARF) ([facility])- (present on admission)  Assessment & Plan  12/28 BUN: 56 <50, creatinine: 2.71 <2.52  Received contrast for CTA TAVR study 12/22.  Consulting [facility] 12/28  Monitor labs    Acute respiratory failure with hypoxia ([facility])- (present on admission)  Assessment & Plan  Septic pulmonary emboli, known chronic mild HFrEF, possible infection  Resp and O2 per protocol  CXR stat 12/28 as breath sounds worse  Mobilize and encourage deep inspiratory efforts as able, minimize sedation as able  As above    Immunosuppression ([facility])- (present on admission)  Assessment & Plan  No humira for over a month per the wife.    Anemia- (present on admission)  Assessment & Plan  12/28 hemoglobin 7.3 < 7.6  Anemia likely of chronic disease  Monitor daily CBC    History of gastric bypass- (present on admission)  Assessment & Plan  High risk Wernicke's  Vit B supplementation    S/P BKA (below knee amputation), left ([facility])- (present on admission)  Assessment & Plan  Wound care.  PT/OT    History of osteomyelitis- (present on admission)  Assessment & Plan  cefepime  ID consulting    Sepsis ([facility])- (present on admission)  Assessment & Plan  Cefepime  IV fluids less than 30m/kg given known aortic stenosis and mild reduction in EF  ID consulting    Severe aortic stenosis- (present on admission)  Assessment & Plan  Avoid hypotension or hypertension  Cardiology consulted.  There was initial plan of a TAVR but now with aortic valve abscess this will need transfer to higher level of care facility or palliative means.    Chronic narcotic use- (present on admission)  Assessment & Plan  Avoid  narcotics for now, make it PRN  Narcan if needed currently patient is arousable    Rheumatoid arthritis (HCC)- (present on admission)  Assessment & Plan  History of RA  Per wife he has not received any immunosuppresants in over a month.         VTE prophylaxis:    heparin ppx

## 2023-12-28 NOTE — PROGRESS NOTES
[facility] -  CONSULTATION NOTE               Author: Darrel Lima D.O. Date & Time: 12/28/2023  11:35 AM       REASON FOR CONSULTATION:   KATERYNA    CHIEF COMPLAINT:   encephalopathy    HISTORY OF PRESENT ILLNESS:    48 y.o. male with past medical history of aortic stenosis/bicuspid aortic valve with severe insufficiency, history of mssa bacteremia s/p treatment, pulmonary hypertension, rheumatoid arthritis on Humira, and multiple different hospitalizations with most frequently being in cardiogenic shock with serratia osteomyelitis previously on cefepime who presented from rehab facility due to encephalopathy.  Patient received a transesophageal echocardiogram due to concern for endocarditis given history of bacteremia and was found to have a perivalvular abscess.  Cardiothoracic surgery was consulted, recommended transfer to higher level care due to the complexity of the condition as patient may need mechanical support backup.    Patient was unable to have a conversation, responds to some commands, was unable to participate in the history.    No F/C/N/V/CP/SOB.  No melena, hematochezia, hematemesis.  No HA, visual changes, or abdominal pain.    REVIEW OF SYSTEMS:    10 point ROS was performed and is as per HPI or otherwise negative    PAST MEDICAL HISTORY:   Past Medical History:   Diagnosis Date    ADD (attention deficit disorder)     Alcohol abuse     Allergic rhinitis 05/21/2009    Anemia 09/2019    Anxiety 05/21/2009    Apnea, sleep     Arrhythmia     afib when hospitaized for infection    Back pain 05/21/2009    Bipolar disorder (HCC)     Bleeding ulcer 05/21/2009    Bleeding ulcer 05/21/2009    History of anemia-now on nexium     Bowel habit changes     constipation related to narcotics    Breath shortness     Congenital anomaly of mitral valve     Patient reports he only has 3 heart valves    Congestive heart failure (HCC)     Daytime sleepiness     Dental disorder     Missing  teeth    Depression 05/21/2009    Eczema 05/21/2009    GERD (gastroesophageal reflux disease)     Heart burn     on meds    Heart murmur     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    Hemorrhagic disorder (HCC)     hx of bleeding ulcers    History of bleeding ulcers 02/12/2015    Hypertension 05/03/2021 12/7/23:  Patient states he has Never had high blood pressure. pt states well controlled on meds    Insomnia 05/21/2009    Intestinal hernia     Migraine 05/21/2009    Morning headache     Obesity, morbid ([facility]) 05/21/2009    Osteomyelitis ([facility]) 10/07/2019    Pain 09/2019    Chronic knee and back pain    Pneumonia     12/4/23:  Last time was around 1 year ago.    Pubic ramus fracture ([facility]) 09/28/2019    Rheumatoid arteritis ([facility]) 09/2019    knee, feet right hand    Sleep apnea 05/21/2009    Did not tolerate cpap, does not use it12/4/23:  Resolved with weight loss.    Snoring        PAST SURGICAL HISTORY:   Past Surgical History:   Procedure Laterality Date    KNEE AMPUTATION BELOW Left 12/16/2023    Procedure: debridement, secondary closure of left revision amputation;  Surgeon: You Olivares M.D.;  Location: [facility];  Service: Orthopedics    ORIF, ANKLE Right 12/6/2023    Procedure: RIGHT SUBTALAR DISLOCATION OPEN REDUCTION INTERNAL FIXATION, TALONAVICULAR OPEN REDUCTION INTERNAL FIXATION, ACHILLES TENDON LENGTHENING, TRIPLE ARTHRODESIS, RIGHT LOWER EXTREMITY APPLICATION OF MULTIPLANAR EXTERNAL FIXATOR;  Surgeon: Calvin Pryor M.D.;  Location: [facility];  Service: Orthopedics    INCISION AND DRAINAGE ORTHOPEDIC Left 10/22/2023    Procedure: REVISION, BELOW KNEE AMPUTATION;  Surgeon: River Colindres M.D.;  Location: [facility];  Service: Orthopedics    IRRIGATION & DEBRIDEMENT GENERAL Left 10/19/2023    Procedure: IRRIGATION AND DEBRIDEMENT, WOUND;  Surgeon: Jay Roe M.D.;  Location: [facility];  Service: Trauma    KNEE AMPUTATION BELOW Left 03/09/2023     Procedure: AMPUTATION, BELOW KNEE;  Surgeon: Wayne Chambers M.D.;  Location: SURGERY [facility];  Service: Orthopedics    PB TOTAL KNEE ARTHROPLASTY Right 2020    Procedure: ARTHROPLASTY, KNEE, TOTAL;  Surgeon: Temo Pires M.D.;  Location: [facility];  Service: Orthopedics    TENDON TRANSFER Right 2020    Procedure: RIGHT DISTAL ULNA RESECTION AND EXTENSOR TENDON TRANSFER;  Surgeon: Marine Rushing M.D.;  Location: SURGERY [facility];  Service: Orthopedics    OTHER ORTHOPEDIC SURGERY Right 2020    total right knee replacement    IRRIGATION & DEBRIDEMENT ORTHO Left 10/09/2019    Procedure: IRRIGATION AND DEBRIDEMENT, WOUND - PELVIC OSTEOMYELITIS;  Surgeon: You Olivares M.D.;  Location: [facility];  Service: Orthopedics    OTHER SURGICAL PROCEDURE  2019    osteomelitis of pelvis cleaned    GASTRIC BYPASS LAPAROSCOPIC      Lucila en y    CHOLECYSTECTOMY      OTHER SURGICAL PROCEDURE      repair of broken penis.  23:  Patient denies any surgery on penis.       FAMILY HISTORY:   family history includes Arthritis in his mother; Cancer in his father and maternal grandmother; Depression in his mother; Heart Disease in his maternal grandmother; Hypertension in his mother; Psychiatric Illness in an other family member; Schizophrenia in his father; Stroke in his maternal aunt.    SOCIAL HISTORY:   Social History     Tobacco Use    Smoking status: Some Days     Current packs/day: 0.00     Types: Cigarettes, Cigars     Last attempt to quit: 2023     Years since quittin.9     Passive exposure: Current    Smokeless tobacco: Never    Tobacco comments:     occasional cigar.  23:  Patient declines smoking cessation information at this time.    Vaping Use    Vaping Use: Never used   Substance Use Topics    Alcohol use: No     Alcohol/week: 0.0 oz     Comment: quit  ago- past ETOH abuse    Drug use: Never       MEDICATIONS:   Home medications  reviewed and documented in chart    No current facility-administered medications on file prior to encounter.     Current Outpatient Medications on File Prior to Encounter   Medication Sig Dispense Refill    cefepime (MAXIPIME) 2 GM Recon Soln Infuse 2 g into a venous catheter every 8 hours. Infusion was stopped  at 0552      ipratropium-albuterol (DUONEB) 0.5-2.5 (3) MG/3ML nebulizer solution Take 3 mL by nebulization 3 times a day.      lactulose 10 GM/15ML Solution Take 20 g by mouth 3 times a day.      linezolid (ZYVOX) 600 MG Tab Take 600 mg by mouth 2 times a day.      morphine (MS IR) 15 MG tablet Take 15 mg by mouth every 4 hours. * scheduled*    0200  0600  1000  1400  1800  2200      morphine ER (MS CONTIN) 15 MG Tab CR tablet Take 15 mg by mouth every 12 hours.      Sodium Chloride (NORMAL SALINE PF) Solution Infuse 20 mL into a venous catheter every 12 hours. For PICC FLUSH  Q12 HOURS AND AFTER EACH USE      magnesium hydroxide (MILK OF MAGNESIA) 400 MG/5ML Suspension Take 30 mL by mouth 1 time a day as needed. Indications: Constipation      sodium zirconium cyclosilicate (LOKELMA) 10 g packet Take 10 g by mouth 2 times a day.      Ergocalciferol (VITAMIN D2) 50 MCG (2000 UT) Tab Take 2,000 Units by mouth every day.      Sodium Zirconium Cyclosilicate (LOKELMA PO) Take 15 g by mouth 2 times a day. * changed to  10 g      spironolactone (ALDACTONE) 25 MG Tab Take 1 Tablet by mouth every day. 30 Tablet 3    senna-docusate (PERICOLACE OR SENOKOT S) 8.6-50 MG Tab Take 2 Tablets by mouth 2 times a day. 30 Tablet 0    apixaban (ELIQUIS) 5mg Tab Take 1 Tablet by mouth 2 times a day. Indications: Thromboembolism secondary to Atrial Fibrillation 60 Tablet     omeprazole (PRILOSEC) 20 MG delayed-release capsule Take 1 Capsule by mouth every day. 30 Capsule 2    DULoxetine (CYMBALTA) 60 MG Cap DR Particles delayed-release capsule Take 1 Capsule by mouth every day. 30 Capsule 2    dapagliflozin propanediol (FARXIGA)  10 MG Tab Take 1 Tablet by mouth every day for 90 days. 30 Tablet 2    multivitamin Tab Take 1 Tablet by mouth every day.      acetaminophen (TYLENOL) 500 MG Tab Take 2 Tablets by mouth in the morning, at noon, and at bedtime. 180 Tablet 2       Current Facility-Administered Medications   Medication Dose Route Frequency Provider Last Rate Last Admin    acetaminophen (Tylenol) tablet 1,000 mg  1,000 mg Enteral Tube Q8HRS Issac Ortega M.D.        oxyCODONE immediate-release (Roxicodone) tablet 5 mg  5 mg Enteral Tube Q3HRS PRN Issac Ortega M.D.        Or    oxyCODONE immediate release (Roxicodone) tablet 10 mg  10 mg Enteral Tube Q3HRS PRN Issac Ortega M.D.        Or    HYDROmorphone (Dilaudid) injection 0.5 mg  0.5 mg Intravenous Q3HRS PRN Issac Ortega M.D.   0.5 mg at 12/27/23 1447    acetaminophen (Tylenol) tablet 650 mg  650 mg Enteral Tube Q6HRS PRN Issac Ortega M.D.        senna-docusate (Pericolace Or Senokot S) 8.6-50 MG per tablet 2 Tablet  2 Tablet Enteral Tube BID Issac Ortega M.D.        And    polyethylene glycol/lytes (Miralax) Packet 1 Packet  1 Packet Enteral Tube QDAY PRN Issac Ortega M.D.        And    magnesium hydroxide (Milk Of Magnesia) suspension 30 mL  30 mL Enteral Tube QDAY PRN Issac Ortega M.D.        And    bisacodyl (Dulcolax) suppository 10 mg  10 mg Rectal QDAY PRN Issac Ortega M.D.        DULoxetine (Cymbalta) capsule 60 mg  60 mg Enteral Tube DAILY Issac Ortega M.D.        sodium zirconium cyclosilicate (Lokelma) packet 10 g  10 g Enteral Tube DAILY AT NOON Issac Ortega M.D.        lansoprazole (Prevacid) solutab 30 mg  30 mg Enteral Tube DAILY Issac Ortega M.D.        Pharmacy Consult: Enteral tube insertion - review meds/change route/product selection  1 Each Other PHARMACY TO DOSE Issac Ortega M.D.        Pharmacy Consult Request ...Pain Management Review 1 Each  1 Each Other PHARMACY TO DOSE Issac Ortega M.D.      "   apixaban (Eliquis) tablet 5 mg  5 mg Enteral Tube BID You Mcmahon D.O.        LR (Bolus) infusion 500 mL  500 mL Intravenous Once PRN Issac Ortega M.D.        piperacillin-tazobactam (Zosyn) 4.5 g in  mL IVPB  4.5 g Intravenous Q8HRS Inocencia Pearl M.D.   Stopped at 12/28/23 1111    furosemide (Lasix) injection 80 mg  80 mg Intravenous BID DIURETIC Inocencia Pearl M.D.   80 mg at 12/28/23 0545       ALLERGIES:  Nsaids, Diphenhydramine, Mirtazapine, Penicillins, and Promethazine    VS:  BP 98/51   Pulse 94   Temp 36.5 °C (97.7 °F) (Temporal)   Resp (!) 26   Ht 1.803 m (5' 11\")   Wt 89.7 kg (197 lb 12 oz)   SpO2 95%   BMI 27.58 kg/m²   Physical Exam  Vitals and nursing note reviewed.   Constitutional:       General: He is in acute distress.      Appearance: He is ill-appearing.   Eyes:      General: No scleral icterus.     Extraocular Movements: Extraocular movements intact.   Cardiovascular:      Rate and Rhythm: Normal rate and regular rhythm.      Heart sounds: Murmur heard.      No friction rub. No gallop.   Pulmonary:      Breath sounds: Rales present. No decreased breath sounds.   Abdominal:      General: Abdomen is flat. Bowel sounds are normal. There is no distension.      Palpations: Abdomen is soft.      Tenderness: There is no abdominal tenderness.   Musculoskeletal:         General: Deformity present. No swelling.   Skin:     General: Skin is warm.           FLUID BALANCE:    Intake/Output Summary (Last 24 hours) at 12/28/2023 1135  Last data filed at 12/28/2023 0700  Gross per 24 hour   Intake 313.76 ml   Output 2550 ml   Net -2236.24 ml       LABS:  Recent Labs     12/26/23  1405 12/27/23  0245 12/28/23  0235   RBC 2.83* 2.90* 2.79*   HEMOGLOBIN 7.7* 7.6* 7.3*   HEMATOCRIT 24.8* 25.5* 24.9*   PLATELETCT 261 241 211   PROTHROMBTM 37.1*  --   --    INR 3.68*  --   --        Recent Labs     12/26/23 2045 12/27/23  0245 12/28/23  0235   SODIUM 128* 131* 136   POTASSIUM 5.6* 5.6* " "4.7   CHLORIDE 98 99 102   CO2 17* 18* 17*   GLUCOSE 87 86 81   BUN 49* 50* 56*   CREATININE 2.40* 2.52* 2.71*   CALCIUM 7.9* 7.8* 7.5*        URINALYSIS:  Lab Results   Component Value Date/Time    COLORURINE DK Yellow 12/26/2023 1505    CLARITY Cloudy (A) 12/26/2023 1505    SPECGRAVITY 1.028 12/26/2023 1505    PHURINE 5.0 12/26/2023 1505    KETONES 15 (A) 12/26/2023 1505    PROTEINURIN 100 (A) 12/26/2023 1505    BILIRUBINUR Small (A) 12/26/2023 1505    UROBILU 0.2 12/26/2023 1505    NITRITE Negative 12/26/2023 1505    LEUKESTERAS Trace (A) 12/26/2023 1505    OCCULTBLOOD Negative 12/26/2023 1505       UPC  No results found for: \"TOTPROTUR\"   Lab Results   Component Value Date/Time    CREATININEU 107.07 12/26/2023 1200       IMAGING:  Imaging studies reviewed by me    EC-MIA W/O CONT   Final Result      DX-TIBIA AND FIBULA RIGHT   Final Result      DX-TIBIA AND FIBULA LEFT   Final Result         No obvious destruction of the tibial and fibular stumps.      However, plain film is very insensitive to detect early osteomyelitis. If there is clinical concern, further evaluation with MRI is recommended.         DX-FOOT-2- RIGHT   Final Result      CT-RENAL COLIC EVALUATION(A/P W/O)   Final Result         Limited exam due to lack of IV contrast.      1. No definite evidence of inflammatory change in the abdomen or pelvis.  The study is however limited due to nonuse of intravenous contrast      2. Redemonstration of groundglass and airspace opacities throughout both lungs. Bilateral pleural effusions, right more than left.      3. Epigastric ventral hernia containing the transverse colon. No evidence of obstruction.      4. Diffuse anasarca.      CT-HEAD W/O   Final Result      Head CT without contrast within normal limits. No evidence of acute cerebral infarction, hemorrhage or mass lesion.         DX-CHEST-PORTABLE (1 VIEW)   Final Result         Diffuse groundglass and airspace opacities throughout both lungs, similar " to prior.      Trace right pleural effusion, similar to prior.          IMPRESSION:  # KATERYNA non-oliguric presumed sec to prerenal etiology given valvular heart failure as well as possible contrast induced nephropathy given IV contrast 12/22  - Filemon score 26.1%  #Acute encephalopathy  #Aortic Valve abscess-pending transfer to higher level of care  #Acute hypoxic respiratory failure with septic pulmonary emboli  #Anemia  #History of gastric bypass  #Sepsis secondary to aortic valve abscess  #Rheumatoid arthritis  #Below knee amputation/osteomyelitis and revision with positive serratia and mssa          SUGGESTIONS:  - Continue lasix 80mg BID diuretic dosing  - Pending transfer to higher level of care for aortic valve abscess  - No compelling indication for RRT  - Daily evaluation for RRT needs  -Optimize volume status and hemodynamics,maintain MAP > 65 mmHg  -Avoid nephrotoxins   -Monitor and correct electrolytes as indicated   -Adjust meds for renal function     Thank you for the consultation!

## 2023-12-28 NOTE — CONSULTS
"INFECTIOUS DISEASES INPATIENT CONSULT NOTE     Date of Service:12/28/2023    Consult Requested By: You Mcmahon D.O.    Reason for Consultation: antibiotoc recommendations    History of Present Illness:   Richard Hubbard II is a 48 y.o. male admitted 12/26/2023 for AMS. Known to ID from prior admission  Known aortic stenosis/bicuspid aortic valve with mod pul HTN, RA on Humira , s/p ORIF right subtalar joint dislocation, right talonavicular joint dislocation right foot triple arthrodesis, right extensor tendon lengthening and application of multiplanar external fixator right lower extremity.  Seen in consult 12/21 by Dr Galdamez:  \" 48 y.o. male admitted 12/6/2023.  Extensive review of documentation from multiple specialties performed in generating this HPI.  Patient with rheumatoid arthritis with rheumatoid nodules on Humira per notes, chronic bilateral lower extremity ulcers since at least 2022.  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  He was readmitted with BKA abscess with MSSA bacteremia, underwent I&D down to bone on 10/19/2023, revision of left BKA 10/22 with primary closure.  He completed 6 weeks of IV Ancef on 12/1/2023 and plan was to follow this with an additional 2 weeks of p.o. Augmentin through 12/15/2023.   Patient was now readmitted with right lower extremity progressive flatfoot deformity with superficial wound without infection and was admitted for elective intervention.  On 12/6, he underwent open reduction of right subtalar and talonavicular joint dislocations, underwent right foot triple arthrodesis, external fixator placement.   He was also noticed to have chronic wound over the prior left BKA stump.  He was taken to the OR on 12/16 for debridement.  Open wound was noted to lead directly down to the tibial stump without muscular coverage.  Thus, a saw was used to recut the tibia and the stump " "was closed.  OR cultures are negative but previous superficial swab from 12/11 from the chronic wound grew Serratia.  Pathology from this procedure notes no acute osteomyelitis but does note area of ulceration and necrosis with some bone marrow fibrosis and eroded cartilage with necrosis of the articular surface edge.\"  He was started on cefepime in the hospital then switched to ertapenem 1 gram IV daily with plan to continue through 1/23/2024 when discharged to rehab 12/21/23   In ED afebrile, waxing and waning MS No leukocytosis  Hypoxemic and CXR cw pulm edema  Elevated troponin and BNP 82848   Blood cultures neg MIA showed worsening AV function and perivalvular abscess  Last positive blood culture was October 18,2023. Negative blood culture 10/20, 12/8, 12/9, and 12/26  Currently on Zosyn Seen by CTS  Infectious Diseases consulted for antibiotic recommendation and management       Review Of Systems:  AMS    PMH:   Past Medical History:   Diagnosis Date    ADD (attention deficit disorder)     Alcohol abuse     Allergic rhinitis 05/21/2009    Anemia 09/2019    Anxiety 05/21/2009    Apnea, sleep     Arrhythmia     afib when hospitaized for infection    Back pain 05/21/2009    Bipolar disorder (HCC)     Bleeding ulcer 05/21/2009    Bleeding ulcer 05/21/2009    History of anemia-now on nexium     Bowel habit changes     constipation related to narcotics    Breath shortness     Congenital anomaly of mitral valve     Patient reports he only has 3 heart valves    Congestive heart failure (HCC)     Daytime sleepiness     Dental disorder     Missing teeth    Depression 05/21/2009    Eczema 05/21/2009    GERD (gastroesophageal reflux disease)     Heart burn     on meds    Heart murmur     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    Hemorrhagic disorder (HCC)     hx of bleeding ulcers    History of bleeding ulcers 02/12/2015    Hypertension 05/03/2021 12/7/23:  Patient states he has Never had high blood pressure. pt states " well controlled on meds    Insomnia 05/21/2009    Intestinal hernia     Migraine 05/21/2009    Morning headache     Obesity, morbid ([facility]) 05/21/2009    Osteomyelitis ([facility]) 10/07/2019    Pain 09/2019    Chronic knee and back pain    Pneumonia     12/4/23:  Last time was around 1 year ago.    Pubic ramus fracture (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    Snoring          PSH:  Past Surgical History:   Procedure Laterality Date    KNEE AMPUTATION BELOW Left 12/16/2023    Procedure: debridement, secondary closure of left revision amputation;  Surgeon: You Olivares M.D.;  Location: [facility];  Service: Orthopedics    ORIF, ANKLE Right 12/6/2023    Procedure: RIGHT SUBTALAR DISLOCATION OPEN REDUCTION INTERNAL FIXATION, TALONAVICULAR OPEN REDUCTION INTERNAL FIXATION, ACHILLES TENDON LENGTHENING, TRIPLE ARTHRODESIS, RIGHT LOWER EXTREMITY APPLICATION OF MULTIPLANAR EXTERNAL FIXATOR;  Surgeon: Calvin Pryor M.D.;  Location: [facility];  Service: Orthopedics    INCISION AND DRAINAGE ORTHOPEDIC Left 10/22/2023    Procedure: REVISION, BELOW KNEE AMPUTATION;  Surgeon: River Colindres M.D.;  Location: [facility];  Service: Orthopedics    IRRIGATION & DEBRIDEMENT GENERAL Left 10/19/2023    Procedure: IRRIGATION AND DEBRIDEMENT, WOUND;  Surgeon: Jay Roe M.D.;  Location: [facility];  Service: Trauma    KNEE AMPUTATION BELOW Left 03/09/2023    Procedure: AMPUTATION, BELOW KNEE;  Surgeon: Wayne Chambers M.D.;  Location: [facility];  Service: Orthopedics    PB TOTAL KNEE ARTHROPLASTY Right 07/22/2020    Procedure: ARTHROPLASTY, KNEE, TOTAL;  Surgeon: Temo Pires M.D.;  Location: [facility];  Service: Orthopedics    TENDON TRANSFER Right 01/22/2020    Procedure: RIGHT DISTAL ULNA RESECTION AND EXTENSOR  TENDON TRANSFER;  Surgeon: Marine Rushing M.D.;  Location: SURGERY [facility];  Service: Orthopedics    OTHER ORTHOPEDIC SURGERY Right 2020    total right knee replacement    IRRIGATION & DEBRIDEMENT ORTHO Left 10/09/2019    Procedure: IRRIGATION AND DEBRIDEMENT, WOUND - PELVIC OSTEOMYELITIS;  Surgeon: You Olivares M.D.;  Location: SURGERY [facility];  Service: Orthopedics    OTHER SURGICAL PROCEDURE  2019    osteomelitis of pelvis cleaned    GASTRIC BYPASS LAPAROSCOPIC      Lucila en y    CHOLECYSTECTOMY      OTHER SURGICAL PROCEDURE      repair of broken penis.  23:  Patient denies any surgery on penis.       FAMILY HX:  Family History   Problem Relation Age of Onset    Hypertension Mother     Arthritis Mother     Depression Mother     Cancer Father         bladder    Schizophrenia Father     Cancer Maternal Grandmother         breast    Heart Disease Maternal Grandmother     Psychiatric Illness Other     Stroke Maternal Aunt     Other Neg Hx         no connective tissue disorders or known aortic disease       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  23:  Patient declines smoking cessation information at this time.    Vaping Use    Vaping Use: Never used   Substance and Sexual Activity    Alcohol use: No     Alcohol/week: 0.0 oz     Comment: quit 2011 ago- past ETOH abuse    Drug use: Never    Sexual activity: Yes     Partners: Female     Comment: ;     Other Topics Concern     Service No    Blood Transfusions Yes    Caffeine Concern No    Occupational Exposure No    Hobby Hazards No    Sleep Concern No    Stress Concern Yes    Weight Concern Yes    Special Diet No    Back Care No    Exercise No    Bike Helmet No    Seat Belt Yes    Self-Exams Yes   Social History Narrative    , lives in [place]     Social Determinants of Health     Financial Resource Strain: Low Risk  (9/7/2023)    Overall Financial Resource Strain (CARDIA)     Difficulty of Paying Living Expenses: Not very hard   Food Insecurity: No Food Insecurity (9/7/2023)    Hunger Vital Sign     Worried About Running Out of Food in the Last Year: Never true     Ran Out of Food in the Last Year: Never true   Transportation Needs: No Transportation Needs (12/26/2023)    PRAPARE - Transportation     Lack of Transportation (Medical): No     Lack of Transportation (Non-Medical): No   Recent Concern: Transportation Needs - Unmet Transportation Needs (10/30/2023)    PRAPARE - Transportation     Lack of Transportation (Medical): Yes     Lack of Transportation (Non-Medical): Yes   Physical Activity: Sufficiently Active (9/7/2023)    Exercise Vital Sign     Days of Exercise per Week: 7 days     Minutes of Exercise per Session: 30 min   Stress: Stress Concern Present (9/7/2023)    [employer] - Occupational Stress Questionnaire     Feeling of Stress : Rather much   Social Connections: Socially Integrated (9/7/2023)    Social Connection and Isolation Panel [NHANES]     Frequency of Communication with Friends and Family: More than three times a week     Frequency of Social Gatherings with Friends and Family: Twice a week     Attends Taoism Services: More than 4 times per year     Active Member of Clubs or Organizations: Yes     Attends Club or Organization Meetings: More than 4 times per year     Marital Status:    Intimate Partner Violence: Not At Risk (9/7/2023)     "Humiliation, Afraid, Rape, and Kick questionnaire     Fear of Current or Ex-Partner: No     Emotionally Abused: No     Physically Abused: No     Sexually Abused: No   Housing Stability: Low Risk  (2023)    Housing Stability Vital Sign     Unable to Pay for Housing in the Last Year: No     Number of Places Lived in the Last Year: 1     Unstable Housing in the Last Year: No     Social History     Tobacco Use   Smoking Status Some Days    Current packs/day: 0.00    Types: Cigarettes, Cigars    Last attempt to quit: 2023    Years since quittin.9    Passive exposure: Current   Smokeless Tobacco Never   Tobacco Comments    occasional cigar.  23:  Patient declines smoking cessation information at this time.      Social History     Substance and Sexual Activity   Alcohol Use No    Alcohol/week: 0.0 oz    Comment: quit  ago- past ETOH abuse       Allergies/Intolerances:  Allergies   Allergen Reactions    Nsaids      Bleeding, \"I had a bleeding ulcer\"    Diphenhydramine Unspecified     \"I get agitated\"    Mirtazapine Unspecified     I had a hard time waking up, lacking mental focus.     Penicillins      \"Had some dizziness\"  Tolerated Unasyn 10/2019  Tolerated Zosyn 2023    Promethazine Unspecified     \"I get very agitated\"         Other Current Medications:    Current Facility-Administered Medications:     acetaminophen (Tylenol) tablet 1,000 mg, 1,000 mg, Enteral Tube, Q8HRS, Issac Ortega M.D.    oxyCODONE immediate-release (Roxicodone) tablet 5 mg, 5 mg, Enteral Tube, Q3HRS PRN **OR** oxyCODONE immediate release (Roxicodone) tablet 10 mg, 10 mg, Enteral Tube, Q3HRS PRN **OR** HYDROmorphone (Dilaudid) injection 0.5 mg, 0.5 mg, Intravenous, Q3HRS PRN, Issac Ortega M.D., 0.5 mg at 23 1447    acetaminophen (Tylenol) tablet 650 mg, 650 mg, Enteral Tube, Q6HRS PRN, Issac Ortega M.D.    senna-docusate (Pericolace Or Senokot S) 8.6-50 MG per tablet 2 Tablet, 2 Tablet, Enteral Tube, BID " **AND** polyethylene glycol/lytes (Miralax) Packet 1 Packet, 1 Packet, Enteral Tube, QDAY PRN **AND** magnesium hydroxide (Milk Of Magnesia) suspension 30 mL, 30 mL, Enteral Tube, QDAY PRN **AND** bisacodyl (Dulcolax) suppository 10 mg, 10 mg, Rectal, QDAY PRN, Issac Ortega M.D.    DULoxetine (Cymbalta) capsule 60 mg, 60 mg, Enteral Tube, DAILY, Issac Ortega M.D.    sodium zirconium cyclosilicate (Lokelma) packet 10 g, 10 g, Enteral Tube, DAILY AT NOON, Issac Ortega M.D.    lansoprazole (Prevacid) solutab 30 mg, 30 mg, Enteral Tube, DAILY, Issac Ortega M.D.    Pharmacy Consult: Enteral tube insertion - review meds/change route/product selection, 1 Each, Other, PHARMACY TO DOSE, Issac Ortega M.D.    Notify provider if pain remains uncontrolled, , , CONTINUOUS **AND** Use the Numeric Rating Scale (NRS), Ys-Baker Faces (WBF), or FLACC on regular floors and Critical-Care Pain Observation Tool (CPOT) on ICUs/Trauma to assess pain, , , CONTINUOUS **AND** Pulse Ox, , , CONTINUOUS **AND** Pharmacy Consult Request ...Pain Management Review 1 Each, 1 Each, Other, PHARMACY TO DOSE **AND** If patient difficult to arouse and/or has respiratory depression (respiratory rate of 10 or less), stop any opiates that are currently infusing and call a Rapid Response., , , CONTINUOUS, Issac Ortega M.D.    apixaban (Eliquis) tablet 5 mg, 5 mg, Enteral Tube, BID, You Mcmahon D.O.    LR (Bolus) infusion 500 mL, 500 mL, Intravenous, Once PRN, Issac Ortega M.D.    [COMPLETED] piperacillin-tazobactam (Zosyn) 4.5 g in  mL IVPB, 4.5 g, Intravenous, Once, Stopped at 12/26/23 2131 **AND** piperacillin-tazobactam (Zosyn) 4.5 g in  mL IVPB, 4.5 g, Intravenous, Q8HRS, Inocencia Pearl M.D., Stopped at 12/28/23 1111    furosemide (Lasix) injection 80 mg, 80 mg, Intravenous, BID DIURETIC, Inocencia Pearl M.D., 80 mg at 12/28/23 0545      Most Recent Vital Signs:  BP 98/51   Pulse 94   Temp 36.5 °C  "(97.7 °F) (Temporal)   Resp (!) 26   Ht 1.803 m (5' 11\")   Wt 89.7 kg (197 lb 12 oz)   SpO2 95%   BMI 27.58 kg/m²   Temp  Av.4 °C (97.6 °F)  Min: 36.2 °C (97.2 °F)  Max: 36.7 °C (98.1 °F)    Physical Exam:  General: Ill-appearing, well nourished no acute distress  HEENT: NCAT, PERRLA, sclera anicteric  Neck: supple, no lymphadenopathy  Chest: CTAB, mildly labored.  Cardiac: RRR,  tachy +ZEE  Abdomen:  soft, non-tender, non-distended  Extremities: No cyanosis, clubbing BKA  Skin: pressure wounds  Neuro: Obtunded DEWITT  Psych: Unable to assess    Pertinent Lab Results:  Recent Labs     23  1405 235 23  0235   WBC 9.4 8.3 8.0      Recent Labs     23  1405 23  0245 23  0235   HEMOGLOBIN 7.7* 7.6* 7.3*   HEMATOCRIT 24.8* 25.5* 24.9*   MCV 87.6 87.9 89.2   MCH 27.2 26.2* 26.2*   PLATELETCT 261 241 211         Recent Labs     23  2045 23  0245 23  0235   SODIUM 128* 131* 136   POTASSIUM 5.6* 5.6* 4.7   CHLORIDE 98 99 102   CO2 17* 18* 17*   CREATININE 2.40* 2.52* 2.71*        Recent Labs     23  1405 235 23  0245   ALBUMIN 2.9* 2.8* 2.7*        Pertinent Micro:  Results       Procedure Component Value Units Date/Time    Urine Culture (New) [[id number]] Collected: 23 1505    Order Status: Completed Specimen: Urine Updated: 23 1142     Significant Indicator NEG     Source UR     Site -     Culture Result Culture in progress.    Narrative:      Indication for culture:->Evaluation for sepsis without a  clear source of infection  Indication for culture:->Evaluation for sepsis without a    Blood Culture - Draw one set from central line if present [[id number]] Collected: 23 1402    Order Status: Completed Specimen: Blood from Peripheral Updated: 23 0707     Significant Indicator NEG     Source BLD     Site LINE     Culture Result No Growth  Note: Blood cultures are incubated for 5 days and  are monitored " continuously.Positive blood cultures  are called to the RN and reported as soon as  they are identified.      Narrative:      Blood Cultures X2. Draw one blood culture from central line  (including implanted port) and one blood culture  peripherally. If no central line present draw blood cultures  times two peripherally from different sites  Left PIC    Blood Culture - Draw one set from central line if present [[id number]] Collected: 12/26/23 1424    Order Status: Completed Specimen: Blood from Line Updated: 12/27/23 0707     Significant Indicator NEG     Source BLD     Site Peripheral     Culture Result No Growth  Note: Blood cultures are incubated for 5 days and  are monitored continuously.Positive blood cultures  are called to the RN and reported as soon as  they are identified.      Narrative:      Blood Cultures X2. Draw one blood culture from central line  (including implanted port) and one blood culture  peripherally. If no central line present draw blood cultures  times two peripherally from different sites  Right AC    Urinalysis [[id number]]  (Abnormal) Collected: 12/26/23 1505    Order Status: Completed Specimen: Urine Updated: 12/26/23 1551     Color DK Yellow     Character Cloudy     Specific Gravity 1.028     Ph 5.0     Glucose Negative mg/dL      Ketones 15 mg/dL      Protein 100 mg/dL      Bilirubin Small     Urobilinogen, Urine 0.2     Nitrite Negative     Leukocyte Esterase Trace     Occult Blood Negative     Micro Urine Req Microscopic    Narrative:      Indication for culture:->Evaluation for sepsis without a  clear source of infection          Blood Culture Hold   Date Value Ref Range Status   10/20/2023 Collected  Final        Studies:  CONCLUSIONS  Heavily calcified bicuspid aortic valve with severe aortic   insufficiency and evidence of perivalvular abscess which also appears   to be calcified.  No other valvular vegetations noted  No DELORES thrombus  No pericardial effusion  Aortic  atherosclerosis noted  IMPRESSION:   H/o MSSA bacteremia-Oct 2023 Treated  BKA infection/osteo and revision  -cult Serratia and MSSA  New AV endocarditis with abscess  AMS  RA  CHF  CKD      PLAN:   CT neg  Recommend MRI if feasible  Switch Zosyn to cefepime  Adjust dose for renal function  CTS following-plan for transfer noted due to surgical complexity vs Palliative    Plan of care discussed with AYAN Mcmahon D.O.. Will continue to follow    Cathleen Clifford M.D.

## 2023-12-29 ENCOUNTER — APPOINTMENT (OUTPATIENT)
Dept: INPATIENT REHAB | Facility: REHABILITATION | Age: 48
End: 2023-12-29

## 2023-12-29 LAB — VIT B1 BLD-MCNC: 109 NMOL/L (ref 70–180)

## 2023-12-29 NOTE — DISCHARGE SUMMARY
Physical Medicine & Rehabilitation Discharge Summary    Admission Date: 12/22/2023    Discharge Date: 12/26/2023    Attending Provider: Keaton Pisano MD/PhD    Admission Diagnosis:   Active Hospital Problems    Diagnosis     *Unilateral complete BKA, left, initial encounter (HCC)     Borderline abnormal TFTs     Acute respiratory failure with hypoxia (HCC)     Acute renal failure (ARF) (HCC)     Hyperkalemia     Hypotension     BKA stump complication (HCC)     Bacteremia due to Staphylococcus aureus     Acquired planovalgus deformity of right foot     Anemia     Chronic diastolic heart failure with preserved ejection fraction (HCC)     History of gastric bypass     CHF (congestive heart failure), NYHA class I, acute on chronic, combined (HCC)     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)     Acute kidney injury (HCC)     Vitamin D deficiency     Hyponatremia     Chronic use of opiate drug for therapeutic purpose     Atrial fibrillation with RVR ([facility])     Aortic root dilatation ([facility])     Severe aortic stenosis     Chronic pain of right knee     Rheumatoid arthritis ([facility])        Discharge Diagnosis:  Active Hospital Problems    Diagnosis     *Unilateral complete BKA, left, initial encounter ([facility])     Borderline abnormal TFTs     Acute respiratory failure with hypoxia (HCC)     Acute renal failure (ARF) (HCC)     Hyperkalemia     Hypotension     BKA stump complication (HCC)     Bacteremia due to Staphylococcus aureus     Acquired planovalgus deformity of right foot     Anemia     Chronic diastolic heart failure with preserved ejection fraction (HCC)     History of gastric bypass     CHF (congestive heart failure), NYHA class I, acute on chronic, combined (HCC)     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)     Acute kidney injury (HCC)     Vitamin D deficiency     Hyponatremia     Chronic use of opiate drug for therapeutic purpose     Atrial fibrillation with RVR ([facility])      Aortic root dilatation (HCC)     Severe aortic stenosis     Chronic pain of right knee     Rheumatoid arthritis (HCC)        HPI per Admission History & Physical:  Mr. Hubbard is a 48-year-old male with a history of aortic stenosis, bicuspid aortic valve, mild pretension with RSVP of 50, history of gastric bypass, history of arthritis on Humira AAA stump abscess/osteomyelitis complicated with MSSA bacteremia on 10/2023 who presented on 12/6 with progressively flatfoot on the right side.  He underwent open reduction of the right subtalar joint dislocation, right talonavicular joint dislocation right foot triple arthrodesis, right extensor tendon lengthening and application of multiplanar external fixator on the right lower extremity on 12/6.  Per orthopedic surgery patient is nonweightbearing to toe-touch weightbearing for transfers on the right lower extremity.  Ex-Fix is to be removed after 8 to 12 weeks.     On 12/8 his hospitalization was complicated by atrial fibrillation with RVR.  Echo at that time showed ejection fraction of 45% with severe aortic stenosis and moderate aortic insufficiency as well as moderate to severe tricuspid regurg and RSVP of 66.  Cardiology was consulted and is recommending a TAVR in the next month.  He underwent a cardiac catheterization on 12/20.  He was not on any beta-blockers ACE inhibitor's or ARB's considering his hypotension.    Patient was admitted to [facility] on 12/22/2023.     Hospital Course by Problem List:  Patient admitted on 12/22/26 and underwent therapy until 12/26/23 when he had increased confusion and increased work of breathing requiring 7L. Transfer to ED for higher level of care.     Functional Status at Discharge  Eating:  Independent  Eating Description:     Grooming:  Maximal Assist (wipe face w/ damp wash cloth.)  Grooming Description:  Increased time, Set-up of equipment, Verbal cueing (Needed [place] assistance w/ wiping face w/ VC for  areas missed d/t weakness and fatigue)  Bathing:  Moderate Assist  Bathing Description:  Assit wtih lower extremities, Assit with perineal, Increased time, Initial preparation for task, Set-up of equipment, Supervision for safety (Bed bath)  Upper Body Dressing:  Minimal Assist  Upper Body Dressing Description:  Increased time, Assit with threading arms through sleeves, Set-up of equipment, Verbal cueing  Lower Body Dressing:  Total Assist  Lower Body Dressing Description:  Increased time, Initial preparation for task, Set-up of equipment, Supervision for safety, Assist with threading into pant leg (Undergarment.)     Walk:  Unable to Participate  Distance Walked:  0  Number of Times Distance Was Traveled:  0  Assistive Device:   (n/a)  Gait Deviation:   (Limited by WB precautions, medical status)  Wheelchair:   (Unable/unsafe)  Distance Propelled:  0   Wheelchair Description:   (Limited by medical status)  Stairs Unable to Participate  Stairs Description Safety concerns (Limited by WB precautions, medical status)  Discharge Location: [facility]  Patient Discharging with Assist of: Paid Caregiver  Level of Supervision Required Upon Discharge: Twenty Four Hour Supervision  Long Term Goals Met: None- See POC  Long Term Goals Not Met: All- See POC  Reason(s) for Goals Not Met: Transfer acute  Criteria for Termination of Services: Patient Transferred to Acute Care for Medical Purposes  Comprehension:  Independent  Comprehension Description:     Expression:  Independent  Expression Description:     Social Interaction:     Social Interaction Description:     Problem Solving:  Modified Independent  Problem Solving Description:     Memory:  Independent  Memory Description:          Keaton DREW M.D., personally performed a complete drug regimen review and no potential clinically significant medication issues were identified.   Discharge Medication:     Medication List        ASK your doctor about  these medications        Instructions   Acetaminophen Extra Strength 500 MG Tabs   Take 2 Tablets by mouth in the morning, at noon, and at bedtime.  Dose: 1,000 mg     cefepime 2 GM Solr  Commonly known as: Maxipime   Infuse 2 g into a venous catheter every 8 hours. Infusion was stopped  at 0552  Dose: 2 g     ipratropium-albuterol 0.5-2.5 (3) MG/3ML nebulizer solution  Commonly known as: Duoneb   Take 3 mL by nebulization 3 times a day.  Dose: 3 mL     lactulose 10 GM/15ML Soln   Take 20 g by mouth 3 times a day.  Dose: 20 g     linezolid 600 MG Tabs  Commonly known as: Zyvox   Take 600 mg by mouth 2 times a day.  Dose: 600 mg     * Lokelma 10 g packet  Generic drug: sodium zirconium cyclosilicate   Take 10 g by mouth 2 times a day.  Dose: 10 g     * LOKELMA PO   Take 15 g by mouth 2 times a day. * changed to  10 g  Dose: 15 g     magnesium hydroxide 400 MG/5ML Susp  Commonly known as: Milk Of Magnesia   Take 30 mL by mouth 1 time a day as needed. Indications: Constipation  Dose: 30 mL     miconazole 2 % Crea  Commonly known as: Micotin  Ask about: Should I take this medication?   Apply  topically 2 times a day. Apply to buttocks     * morphine ER 15 MG Tbcr tablet  Commonly known as: Ms Contin   Take 15 mg by mouth every 12 hours.  Dose: 15 mg     * morphine 15 MG tablet  Commonly known as: MS IR   Take 15 mg by mouth every 4 hours. * scheduled*    0200  0600  1000  1400  1800  2200  Dose: 15 mg     multivitamin Tabs   Take 1 Tablet by mouth every day.  Dose: 1 Tablet     normal saline PF Soln   Infuse 20 mL into a venous catheter every 12 hours. For PICC FLUSH  Q12 HOURS AND AFTER EACH USE  Dose: 20 mL     Vitamin D2 50 MCG (2000 UT) Tabs   Take 2,000 Units by mouth every day.  Dose: 2,000 Units           * This list has 4 medication(s) that are the same as other medications prescribed for you. Read the directions carefully, and ask your doctor or other care provider to review them with you.                   Discharge Diet:  No Active Diet Orders      Discharge Activity:  TBD     Disposition:  Patient to discharge to ED for medical management.    Equipment:  TBD    Follow-up & Discharge Instructions:  Follow up with your primary care provider (PCP) within 7-10 days of discharge to review your medications and take over your care.     If you develop chest pain, fever, chills, change in neurologic function (weakness, sensation changes, vision changes), or other concerning sxs, seek immediate medical attention or call 911.      Future Appointments   Date Time Provider Department Center   12/29/2023  3:00 PM REHAB NON-THERAPY PROVIDER [facility]1 None   12/30/2023  7:00 AM REHAB NON-THERAPY PROVIDER [facility]1 None   12/30/2023  3:00 PM REHAB NON-THERAPY PROVIDER [facility]1 None   12/31/2023  7:00 AM REHAB NON-THERAPY PROVIDER [facility] None   12/31/2023  3:00 PM REHAB NON-THERAPY PROVIDER [facility]1 None   1/1/2024  3:00 PM REHAB NON-THERAPY PROVIDER [facility]1 None   1/2/2024  3:00 PM REHAB NON-THERAPY PROVIDER [facility]1 None   1/3/2024  3:00 PM REHAB NON-THERAPY PROVIDER [facility]1 None   1/4/2024  3:00 PM REHAB NON-THERAPY PROVIDER [facility]1 None   1/5/2024  3:00 PM REHAB NON-THERAPY PROVIDER [facility]1 None   1/6/2024  3:00 PM REHAB NON-THERAPY PROVIDER [facility] None   1/7/2024  3:00 PM REHAB NON-THERAPY PROVIDER [facility]1 None   1/8/2024  7:00 AM REHAB NON-THERAPY PROVIDER [facility]1 None   1/8/2024  3:00 PM REHAB NON-THERAPY PROVIDER [facility]1 None   1/9/2024  7:00 AM REHAB NON-THERAPY PROVIDER [facility]1 None   1/9/2024  3:00 PM REHAB NON-THERAPY PROVIDER [facility]1 None   1/10/2024  7:00 AM REHAB NON-THERAPY PROVIDER [facility]1 None   1/10/2024  3:00 PM REHAB NON-THERAPY PROVIDER [facility]1 None   1/11/2024  7:00 AM REHAB NON-THERAPY PROVIDER [facility]1 None   1/11/2024  3:00 PM REHAB NON-THERAPY PROVIDER [facility]1 None   1/12/2024  7:00 AM REHAB NON-THERAPY PROVIDER [facility]1 None   1/12/2024 11:30 AM [facility] None   1/12/2024  3:00 PM REHAB NON-THERAPY PROVIDER [facility] None   1/13/2024  7:00 AM REHAB NON-THERAPY PROVIDER [facility]  None   1/13/2024  3:00 PM REHAB NON-THERAPY PROVIDER [facility]1 None   1/14/2024  7:00 AM REHAB NON-THERAPY PROVIDER [facility]1 None   1/14/2024  3:00 PM REHAB NON-THERAPY PROVIDER [facility]1 None   1/15/2024  7:00 AM REHAB NON-THERAPY PROVIDER [facility]1 None   1/15/2024  3:00 PM REHAB NON-THERAPY PROVIDER [facility]1 None   1/16/2024  7:00 AM REHAB NON-THERAPY PROVIDER 1 None   1/16/2024  3:00 PM REHAB NON-THERAPY PROVIDER [facility]1 None   1/17/2024  7:00 AM REHAB NON-THERAPY PROVIDER [facility]1 None   1/17/2024  3:00 PM REHAB NON-THERAPY PROVIDER [facility]1 None   1/18/2024  7:00 AM REHAB NON-THERAPY PROVIDER [facility]1 None   1/19/2024  7:00 AM REHAB NON-THERAPY PROVIDER 1 None   1/20/2024  7:00 AM REHAB NON-THERAPY PROVIDER 1 None       Condition on Discharge:  Merary Pisano M.D.

## 2023-12-29 NOTE — CARE PLAN
Problem: Humidified High Flow Nasal Cannula  Goal: Maintain adequate oxygenation dependent on patient condition  Description: Target End Date:  resolve prior to discharge or when underlying condition is resolved/stabilized    1.  Implement humidified high flow oxygen therapy  2.  Titrate high flow oxygen to maintain appropriate SpO2  Outcome: Progressing  Flowsheets  Taken 12/28/2023 1605 by Sinai Cantu  O2 (LPM): 30  FiO2%: 60 %  Taken 12/28/2023 0103 by Yamini King RRT  Indication: All other patients: SpO2 less than 90% despite conventional supplemental oxygen devices  Outcome: Normoxia

## 2023-12-29 NOTE — DISCHARGE SUMMARY
Death Summary    Cause of Death  Acute respiratory failure due to acute pulmonary edema due to heart failure due to abscess of bicuspid aortic valve with contributing acute renal failure, acute encephalopathy, likely all ultimately related to immunosuppression for rheumatoid arthritis history.    Comorbid Conditions at the Time of Death  Principal Problem:    Acute encephalopathy (POA: Yes)  Active Problems:    Rheumatoid arthritis (HCC) (Chronic) (POA: Yes)      Overview: On Humira    Chronic narcotic use (POA: Yes)    Severe aortic stenosis (POA: Yes)    Sepsis (HCC) (POA: Yes)    History of osteomyelitis (POA: Yes)      Overview: History of nodules/ chronic ulcers of bilateral feet feet, thought to be       related to rheumatological condition vs structural abnormality.s/p left       BKA on 3/9/23 with Dr. Chambers    History of immunosuppressive therapy (Chronic) (POA: Yes)    S/P BKA (below knee amputation), left (HCC) (POA: Yes)    History of gastric bypass (POA: Yes)    Anemia (POA: Yes)    Immunosuppression (HCC) (POA: Yes)    Acute respiratory failure with hypoxia (HCC) (POA: Yes)    Acute renal failure (ARF) (HCC) (POA: Yes)    Hyperkalemia (POA: Yes)    Confusion (POA: Yes)    Chronic HFrEF (heart failure with reduced ejection fraction) (HCC) (POA: Unknown)    Right bundle branch block (POA: Unknown)    Pulmonary embolism (HCC) (POA: Unknown)    Aortic valve abscess (POA: Unknown)  Resolved Problems:    * No resolved hospital problems. *      History of Presenting Illness and Hospital Course  This is a 48 y.o. male with a history of immunosuppression for rheumatoid arthritis, gastric bypass, prior left below the knee amputation, recent right lower leg orthopedic surgery for charcot foot that was admitted 12/26/2023 from Rehab for acute encephalopathy repeating names but unable to answer questions. He was noted to have KATERYNA and hyperkalemia and requiring 5 L supplemental oxygen. During work up he was found  to have an abscess of the aortic valve on a MIA.  His blood cultures from 12/26 were negative but suspected endocarditis.  He was noted to have filing defects on CT chest with concern of septic emboli as well.  His last positive blood cultures were 10/18/23 with MSSA.  He was seen by cardiac surgery and they felt he was too ill for aortic valve surgery and recommended transfer to a facility with higher level of care.  This was discussed with the wife and she felt comfort measures after prolonged discussion would be best.      Death Date: 12/28/23   Death Time: 2110         Pronounced By (RN1): Meredith GARCIA  Pronounced By (RN2): Ashley DAMIAN

## 2023-12-29 NOTE — PROGRESS NOTES
Monitor summary:  SR BBB 1st degree 91-99  0.23/0.18/0.52                   See note below.   Oralia

## 2023-12-30 ENCOUNTER — APPOINTMENT (OUTPATIENT)
Dept: INPATIENT REHAB | Facility: REHABILITATION | Age: 48
End: 2023-12-30

## 2023-12-31 ENCOUNTER — APPOINTMENT (OUTPATIENT)
Dept: INPATIENT REHAB | Facility: REHABILITATION | Age: 48
End: 2023-12-31

## 2024-01-01 ENCOUNTER — APPOINTMENT (OUTPATIENT)
Dept: INPATIENT REHAB | Facility: REHABILITATION | Age: 49
End: 2024-01-01

## 2024-01-02 NOTE — DOCUMENTATION QUERY
[facility]                                                                       Query Response Note      PATIENT:               LESLIE BADILLO  ACCT #:                  [id number]  MRN:                     [id number]  :                      1975  ADMIT DATE:       2023 11:59 AM  DISCH DATE:        2023 5:07 PM  RESPONDING  PROVIDER #:        [id number]           QUERY TEXT:    Reamputation of left leg below the knee is documented in the  Op Report.    Please clarify the level of amputation:      The patient's Clinical Indicators include:   Op Report:  Reamputation left leg below the knee.  The wound was extended along the previous incision line until of the distal tibial stump could be fully exposed.  A saw was used to recut the tibia removing a napkin ring of bone.   Risk Factors: chronic wound/ exposed tibia  Treatment: reamputation    Thank you,  Di Carrera RN, BSN, CCDS  Clinical   Connect via [employer] Messenger  Options provided:   -- Low   -- Medium   -- High   -- Other explanation, please specify      Query created by: Di Carrera on 2023 6:55 AM    RESPONSE TEXT:    Medium          Electronically signed by:  ALFREDITO MEZA MD 2024 7:21 AM

## 2024-01-09 NOTE — PROGRESS NOTES
"Hospital Medicine Daily Progress Note    Date of Service  12/20/2023    Chief Complaint  Richard Hubbard II is a 48 y.o. male admitted 12/6/2023 for \"an internal fixation of the right subtalar dislocation    Hospital Course    Patient is a 48-year-old male with a past medical history significant for moderate aortic stenosis, bicuspid aortic valve, mildly to moderate pulmonary hypertension with RSVP of 50, history of gastric bypass, history of rheumatoid arthritis, left BKA stump abscess/osteomyelitis complicated with MSSA bacteremia on 10/23.  Patient was supposed to be on Ancef daily 12/1 and continue Augmentin 875/125 1 tablet p.o. twice daily for 8 weeks.  He was admitted on 12/6 by orthopedic surgery and he was noted to have progressive flatfoot on the right side.  He underwent open reduction of right subtalar joint dislocation, right talonavicular joint dislocation right foot triple arthrodesis, right extensor tendon lengthening and application of multiplanar external fixator right lower extremity on 12/6.    Per orthopedic surgery, patient will be nonweightbearing to toe-touch weightbearing for transfer on the right lower extremity.  Will obtain PT/OT, continue DVT prophylaxis and multimodal pain management.    On December 8, 2023 rapid response was called as patient found to have SVT.    Patient found to have A-fib with RVR with prior history of A-fib.  Patient transferred to ICU for multiorgan failure and hypotension on December 9, 2023.  Patient received central venous line placement on December 9, 2023.  He also underwent arterial line placement.    Patient underwent left below-knee amputation for chronic wound and exposed tibia on December 16, 2023 by Dr. Olivares.    On December 19, 2023 intensivist Dr. Donovan requested transfer care to hospitalist service.      Interval Problem Update    12/19/23    I evaluated and examined him at the bedside.  Currently he is on 2 L of oxygen and maintaining oxygen " saturation.  Current white blood cell count is 12.1, hemoglobin of 8.3 and platelet count of 144.  CMP showed sodium of 132, potassium 4.2, BUN of 23 and creatinine 0.79.  He reported generalized pain.  I discussed plan of care with him and answered all his questions.    12/20/23  I evaluated and examined him at the bedside.  He reported that he is feeling better.  Plan is to remove Rivera's catheter and CBC prior to his transfer to telemetry floor.  I discussed plan of care with cardiology team and I also discussed with orthopedic team and it is okay to use full anticoagulation or antiplatelet therapy.  Patient underwent cardiac catheterization that did not show coronary artery disease.  Patient blood pressure remains low and he is asymptomatic.  Later this afternoon I received page from bedside RN that after cardiac catheterization patient blood pressure is running on the lower side.  I ordered a small dose of 500 cc of IV fluid bolus.    I have discussed this patient's plan of care and discharge plan at IDT rounds today with Case Management, Nursing, Nursing leadership, and other members of the IDT team.    Consultants/Specialty  cardiology, critical care, and orthopedics    Code Status  Full Code    Disposition  The patient is not medically cleared for discharge to home or a post-acute facility.  Anticipate discharge to: skilled nursing facility    I have placed the appropriate orders for post-discharge needs.    Review of Systems  Review of Systems   Constitutional:  Positive for malaise/fatigue. Negative for chills, fever and weight loss.   HENT:  Negative for hearing loss and tinnitus.    Eyes:  Negative for blurred vision, double vision, photophobia and pain.   Respiratory:  Negative for cough, sputum production and shortness of breath.    Cardiovascular:  Negative for chest pain, palpitations, orthopnea and leg swelling.   Gastrointestinal:  Negative for abdominal pain, constipation, diarrhea, nausea and  vomiting.   Genitourinary:  Negative for dysuria, frequency and urgency.   Musculoskeletal:  Positive for joint pain and myalgias. Negative for back pain and neck pain.   Skin:  Negative for rash.   Neurological:  Negative for dizziness, tingling, tremors, sensory change, speech change, focal weakness and headaches.   Psychiatric/Behavioral:  Negative for hallucinations and substance abuse.    All other systems reviewed and are negative.       Physical Exam  Temp:  [36.3 °C (97.3 °F)-36.7 °C (98.1 °F)] 36.6 °C (97.9 °F)  Pulse:  [72-87] 72  Resp:  [16-41] 17  BP: ()/(39-58) 75/41  SpO2:  [92 %-100 %] 93 %    Physical Exam  Vitals reviewed.   Constitutional:       General: He is not in acute distress.  HENT:      Head: Normocephalic and atraumatic. No contusion.      Right Ear: External ear normal.      Left Ear: External ear normal.      Nose: Nose normal.      Mouth/Throat:      Pharynx: No oropharyngeal exudate.   Eyes:      General:         Right eye: No discharge.         Left eye: No discharge.      Pupils: Pupils are equal, round, and reactive to light.   Cardiovascular:      Rate and Rhythm: Normal rate and regular rhythm.      Heart sounds: No murmur heard.     No friction rub. No gallop.   Pulmonary:      Effort: Pulmonary effort is normal.      Breath sounds: No wheezing or rhonchi.   Abdominal:      General: Bowel sounds are normal. There is no distension.      Palpations: Abdomen is soft.      Tenderness: There is no abdominal tenderness. There is no rebound.   Musculoskeletal:         General: No swelling or tenderness. Normal range of motion.      Cervical back: No rigidity. No muscular tenderness.      Comments: Left BKA  External fixator on right   Skin:     General: Skin is warm and dry.      Coloration: Skin is not jaundiced.   Neurological:      General: No focal deficit present.      Mental Status: He is alert.      Cranial Nerves: No cranial nerve deficit.      Sensory: No sensory deficit.    Psychiatric:         Mood and Affect: Mood normal.         Fluids    Intake/Output Summary (Last 24 hours) at 12/20/2023 2023  Last data filed at 12/20/2023 1800  Gross per 24 hour   Intake 480 ml   Output 3300 ml   Net -2820 ml         Laboratory  Recent Labs     12/19/23 0220 12/20/23  0524 12/20/23  1350   WBC 12.1* 14.0* 12.9*   RBC 3.13* 3.39* 3.19*   HEMOGLOBIN 8.3* 9.1* 8.6*   HEMATOCRIT 27.9* 30.1* 27.9*   MCV 89.1 88.8 87.5   MCH 26.5* 26.8* 27.0   MCHC 29.7* 30.2* 30.8*   RDW 53.0* 53.0* 51.4*   PLATELETCT 144* 189 148*   MPV 11.4 10.6 11.1       Recent Labs     12/19/23 0220 12/20/23  0524 12/20/23  1350   SODIUM 132* 129* 130*   POTASSIUM 4.2 4.4 3.9   CHLORIDE 101 99 96   CO2 22 22 21   GLUCOSE 82 100* 108*   BUN 23* 20 20   CREATININE 0.79 0.77 0.81   CALCIUM 7.7* 7.9* 7.6*       Recent Labs     12/20/23  1350   APTT 27.1   INR 1.31*               Imaging  DX-FOOT-COMPLETE 3+ RIGHT   Final Result      1.  ORIF with extensive hardware consistent with known history of triple arthrodesis.   2.  External fixator hardware is also present.      DX-CHEST-PORTABLE (1 VIEW)   Final Result         1.  Pulmonary edema and/or infiltrates are identified, which are stable since the prior exam.   2.  Trace bilateral pleural effusions   3.  Cardiomegaly      DX-CHEST-PORTABLE (1 VIEW)   Final Result         1.  Coronary edema and/or infiltrates, stable since prior study.   2.  Trace right pleural effusion, stable   3.  Cardiomegaly      US-EXTREMITY VENOUS LOWER BILAT   Final Result      EC-ECHOCARDIOGRAM COMPLETE W/ CONT   Final Result      US-RUQ   Final Result         1. Hepatic steatosis.   2. Otherwise, normal.      DX-CHEST-PORTABLE (1 VIEW)   Final Result         1. [place] catheter tip is located within a right lower lobe pulmonary arterial branch, repositioning may be indicated.   2. Stable pulmonary edema, cardiomegaly, and small pleural effusions.      DX-CHEST-PORTABLE (1 VIEW)   Final Result      1.   Small right pleural effusion with adjacent airspace disease.   2.  Worsening interstitial infiltrates and/or edema.      DX-CHEST-LIMITED (1 VIEW)   Final Result      1.  Enlarged cardiac silhouette with changes of vascular congestion.      DX-PORTABLE FLUORO > 1 HOUR   Final Result      Portable fluoroscopy utilized for 1 minute 48 seconds.         INTERPRETING LOCATION: [address] [facility], [facility] [place], [address]      DX-FOOT-COMPLETE 3+ RIGHT   Final Result      Digitized intraoperative radiograph is submitted for review. This examination is not for diagnostic purpose but for guidance during a surgical procedure. Please see the patient's chart for full procedural details.         INTERPRETING LOCATION: [address] [facility], [facility] [place], [address]      CL-LEFT HEART CATHETERIZATION WITH POSSIBLE INTERVENTION    (Results Pending)   CL-LEFT HEART CATHETERIZATION WITH POSSIBLE INTERVENTION    (Results Pending)   CT-CTA CHEST PULMONARY ARTERY W/ RECONS    (Results Pending)   CTA ABDOMEN PELVIS W & W/O POST PROCESS    (Results Pending)        Assessment/Plan  * Cardiogenic shock (HCC)  Assessment & Plan  Admitted to the ICU 12/09/23 with suspected BiV failure    - Continue to monitor while off pressors  - IV lasix changed to PO torsemide  - Goal SBP > 90    Monitor input and output    Hypotension  Assessment & Plan  Patient blood pressure is running on the lower side and it was above systolic blood pressure was 100 mmHg at the time of evaluation.  I received a page from bedside RN that after cardiac catheterization his blood pressure was low I ordered 500 cc of IV fluid bolus.  Use IV fluid with caution in the setting of cardiogenic shock.        Elevated LFTs  Assessment & Plan  12/20/2023   Secondary to shock liver    - Improving  - Maintain adequate perfusion  - RUQ US reassuring  - Hepatitis panel negative  - Avoid hepatotoxins  Ordered CMP for tomorrow    Immunosuppression (HCC)  Assessment & Plan  Holding Humira for now given infected left  lower extremity    Dislocation of right subtalar joint  Assessment & Plan  He underwent open reduction of right subtalar joint dislocation, right talonavicular joint dislocation right foot triple arthrodesis, right extensor tendon lengthening and application of multiplanar external fixator right lower extremity on 12/6 by ortho  Per orthopedic surgery, patient will be nonweightbearing to toe-touch weightbearing for transfer on the right lower extremity  - PT/OT as tolerated  - DVTppx with Lovenox  - Pain regimen  - Wound care    Hx of gastric bypass  Assessment & Plan  Encourage ongoing weight loss management    Hypokalemia- (present on admission)  Assessment & Plan  12/20/2023   Replace as needed    Acute kidney injury (HCC)- (present on admission)  Assessment & Plan  Now RESOLVED  Secondary to cardiogenic shock/ATN   UOP and renal function improved with diuretics     - Strict I/Os  - Renally dose meds  - Avoid nephrotoxic agents as able  - Continue to monitor    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)- (present on admission)  Assessment & Plan  Last echo from October 20, 2023 with EF 55%, grade 1 diastolic dysfunction with moderate AAS/AI and RVSP of 45 mmHg --> now in biventricular failure as seen on POCUS per Dr. Gonda 12/9    - Repeat echo 12/9 with EF 45% and severe AS  - Can remove Holley  Cardiology has been following him   Continue monitor closely on telemetry.  Discussed plan of care with patient and answered all his questions.    S/P BKA (below knee amputation), left (HCC)- (present on admission)  Assessment & Plan  With infected stump  - Blood cultures NGTD so far  - Wound cx + for serratia  - Started Cefepime 7d course on 12/13  - Plan for I&D of left BKA 12/16  Continue IV antibiotic and continue wound care  I discussed on of care with orthopedic surgery team.    Hyponatremia- (present on admission)  Assessment & Plan  12/20/2023   Likely 2/2 heart failure  Monitor with above  therapies  I ordered CMP for tomorrow.    Chronic pain- (present on admission)  Assessment & Plan  Continue home meds    Atrial fibrillation with RVR (HCC)- (present on admission)  Assessment & Plan  History of A-fib, went into AVR 12/8, converted back to normal sinus rhythm 12/9, now back in afib 12/10    - Continued PO amiodarone  - Echo 12/9 showing EF 45% and severe AS  Started him on heparin gtt.  Continue to monitor electrolytes and replace as needed.      Sepsis (HCC)- (present on admission)  Assessment & Plan  This is Septic shock Not present on admission  SIRS criteria identified on my evaluation include: Tachycardia, with heart rate greater than 90 BPM, Tachypnea, with respirations greater than 20 per minute, and Leukocytosis, with WBC greater than 12,000  Clinical indicators of end organ dysfunction include Hypotension with systolic blood pressure less than 90 or MAP less than 65  Indicators of septic shock include: Sepsis present and persistent hypotension despite fluid resuscitation   Sources is: Soft tissue  S/p sepsis protocol initiated 12/8  Crystalloid Fluid Administration: Resuscitation volume of 2 L ordered. Reason that resuscitation volume of less than 30ml/kg was ordered concern for causing harm given CHF  IV antibiotics as appropriate for source of sepsis  Reassessment: I have reassessed the patient's hemodynamic status  Monitor lactic acid, urine output, vital signs closely for adequacy of resuscitation    - Linezolid + ceftriaxone changed to Cefepime  - Previous wound culture from 10/2023 grew staph and strep  - Blood cultures from this admission NGTD   12/20/2023 I am continuing IV cefepime.      Aortic stenosis due to bicuspid aortic valve- (present on admission)  Assessment & Plan  12/20/2023   Moderate on last echo in October 2023  - Repeat echo revealing severe AS  Cardiology has been following him.  His blood pressure remains low.  He remains a stable plan is to transfer him to  telemetry floor.  I discussed plan of care with cardiology team.    Rheumatoid arthritis (HCC)- (present on admission)  Assessment & Plan  As needed analgesics  Holding immunosuppressants for now in the setting of infection      I personally discussed case with orthopedic surgery regarding patient current  medical condition plan of care    I discussed on of care with cardiology team regarding patient current  medical condition and plan of care.      VTE prophylaxis:    therapeutic anticoagulation with weight-based heparin gtt, pharmacy to adjust PRN      I have performed a physical exam and reviewed and updated ROS and Plan today (12/20/2023). In review of yesterday's note (12/19/2023), there are no changes except as documented above.         [No Acute Distress] : no acute distress [Well Nourished] : well nourished [Well Developed] : well developed [Well-Appearing] : well-appearing [Normal Sclera/Conjunctiva] : normal sclera/conjunctiva [PERRL] : pupils equal round and reactive to light [EOMI] : extraocular movements intact [Normal Outer Ear/Nose] : the outer ears and nose were normal in appearance [Normal Oropharynx] : the oropharynx was normal [No JVD] : no jugular venous distention [No Lymphadenopathy] : no lymphadenopathy [Supple] : supple [Thyroid Normal, No Nodules] : the thyroid was normal and there were no nodules present [No Respiratory Distress] : no respiratory distress  [No Accessory Muscle Use] : no accessory muscle use [Clear to Auscultation] : lungs were clear to auscultation bilaterally [Normal Rate] : normal rate  [Regular Rhythm] : with a regular rhythm [Normal S1, S2] : normal S1 and S2 [No Murmur] : no murmur heard [No Carotid Bruits] : no carotid bruits [No Abdominal Bruit] : a ~M bruit was not heard ~T in the abdomen [No Varicosities] : no varicosities [Pedal Pulses Present] : the pedal pulses are present [No Edema] : there was no peripheral edema [No Palpable Aorta] : no palpable aorta [No Extremity Clubbing/Cyanosis] : no extremity clubbing/cyanosis [Soft] : abdomen soft [Non Tender] : non-tender [Non-distended] : non-distended [No Masses] : no abdominal mass palpated [No HSM] : no HSM [Normal Bowel Sounds] : normal bowel sounds [Normal Posterior Cervical Nodes] : no posterior cervical lymphadenopathy [Normal Anterior Cervical Nodes] : no anterior cervical lymphadenopathy [No CVA Tenderness] : no CVA  tenderness [No Spinal Tenderness] : no spinal tenderness [No Joint Swelling] : no joint swelling [Grossly Normal Strength/Tone] : grossly normal strength/tone [No Rash] : no rash [Coordination Grossly Intact] : coordination grossly intact [No Focal Deficits] : no focal deficits [Normal Gait] : normal gait [Deep Tendon Reflexes (DTR)] : deep tendon reflexes were 2+ and symmetric [Normal Affect] : the affect was normal [Normal Insight/Judgement] : insight and judgment were intact

## 2024-01-20 NOTE — DOCUMENTATION QUERY
[facility]                                                                       Query Response Note      PATIENT:               LESLIE BADILLO  ACCT #:                  [id number]  MRN:                     [id number]  :                      1975  ADMIT DATE:       2023 1:46 PM  DISCH DATE:        2023 9:10 PM  RESPONDING  PROVIDER #:        [id number]           QUERY TEXT:    Acute encephalopathy (POA) is documented in the medical record.      Can the type of encephalopathy be further specified based on the clinical findings and treatment?    The patient's Clinical Indicators include:  H&P: Possible multifactorial (septic, drug/narcotic induced) encephalopathy.     HM: Acute encephalopathy. Concern for infectious etiology with endocarditis     Clinical Findings: Presented with AMS, not responding to questions appropriately per ED assessment, lethargy, head CT within normal limits   Risk Factors: Sepsis, aortic valve abscess, acute hypoxic respiratory failure, KATERYNA   Treatment: IV abx, ID consult, minimizing sedation as able, monitoring of neuro status     Thank you for your time and attention,  ANGELA Owen RN  Available via Voalte  Options provided:   -- Metabolic encephalopathy   -- Other type of encephalopathy   -- Other explanation, (please specify other explanation)      Query created by: Gaby Horton on 2024 4:43 PM    RESPONSE TEXT:    Other type of encephalopathy       QUERY TEXT:    Per Discharge Summary, acute pulmonary edema was due to underlying chronic systolic heart failure.     Can the acuity of the heart failure be further specified based on the clinical indicators and treatments?    The patient's Clinical Indicators include:   CXR: Interval increase in diffuse interstitial and airspace opacities which could represent multifocal pneumonia or edema.    Clinical Findings: Hypoxemia, mildly labored  breathing, RR up to 34, acute pulmonary edema  Risk Factors: Chronic HFrEF, acute medical state (aortic valve abscess, sepsis, KATERYNA)   Treatment: Furosemide 80mg BID, repeat CXR, monitoring of respiratory status     Thank you for your time and attention,  Gaby Horton MSN RN  Available via Voalte  Options provided:   -- Acute on chronic   -- Chronic, stable CHF   -- Other explanation, (please specify other explanation)      Query created by: Gaby Horton on 1/4/2024 4:43 PM    RESPONSE TEXT:    Other explanation - septic pulmonary emboli/pneumonia          Electronically signed by:  ALFREDITO YEUNG DO 1/20/2024 10:07 AM

## 2024-07-25 NOTE — H&P
Hospital Medicine History & Physical Note    Date of Service  2/28/2023    Primary Care Physician  JOSIANE Turner.    Consultants  critical care and orthopedics    Specialist Names: Dr Kathi Contreras    Code Status  Full Code    Chief Complaint  Chief Complaint   Patient presents with    Flu Like Symptoms     X 3 days, patient reports malaise, fever, and diarrhea     Low Blood Sugar     40 per EMS. Patient given 250 cc D10. FSBS up to 111    Leg Swelling     Redness and warmth to the LLE. Chronic wounds to bilateral feet        History of Presenting Illness  Richard Hubbard II is a 47 y.o. male who presented 2/28/2023 with history of rheumatoid arthritis on Humira with history of planovalgus deformities and foot ulcers with recent MRI concerning for osteomyelitis of the left navicular bone presented to the emergency department for evaluation of confusion and fever of 103 associated with hypoglycemia and worsening redness of the lower extremities more so on the left lower extremity.  Patient is confused and unable to provide any history.  Wife at the bedside reports that he has not been feeling well for the past 2 to 3 days has had intermittent diarrhea nonproductive cough.  He has a history of chronic pain syndrome with narcotic dependence.  The patient is not diabetic and has not had any prior history of hypoglycemia.  According to the wife he has been in the past on prednisone for rheumatoid arthritis flares but has not taken any over the past month.  No headache no loss of consciousness.  Patient was noted to be hypotensive and hypoglycemic received D5 infusion and vancomycin and Zosyn and clindamycin in the emergency department.    I discussed the plan of care with family and critical care.    Review of Systems  Review of Systems   Unable to perform ROS: Mental status change     Past Medical History   has a past medical history of ADD (attention deficit disorder), Alcohol abuse,  "Allergic rhinitis (5/21/2009), Anemia (09/2019), Anxiety (5/21/2009), Apnea, sleep, Arrhythmia, Back pain (5/21/2009), Bipolar disorder ([facility]), Bleeding ulcer (5/21/2009), Bleeding ulcer (5/21/2009), Bowel habit changes, Daytime sleepiness, Depression (5/21/2009), Eczema (5/21/2009), GERD (gastroesophageal reflux disease), Heart burn, Heart murmur, Hemorrhagic disorder ([facility]), History of bleeding ulcers (2/12/2015), Hypertension (05/03/2021), Insomnia (5/21/2009), Migraine (5/21/2009), Morning headache, Obesity, morbid (HCC) (5/21/2009), Osteomyelitis ([facility]) (10/7/2019), Pain (09/2019), Pubic ramus fracture ([facility]) (9/28/2019), Rheumatoid arteritis ([facility]) (09/2019), Sleep apnea (5/21/2009), and Snoring.    Surgical History   has a past surgical history that includes gastric bypass laparoscopic (2000); irrigation & debridement ortho (Left, 10/9/2019); other surgical procedure; other surgical procedure (2019); pr total knee arthroplasty (Right, 7/22/2020); cholecystectomy (2000); tendon transfer (Right, 1/22/2020); and other orthopedic surgery (Right, 2020).     Family History  family history includes Arthritis in his mother; Cancer in his father and maternal grandmother; Depression in his mother; Heart Disease in his maternal grandmother; Hypertension in his mother; Psychiatric Illness in an other family member; Schizophrenia in his father; Stroke in his maternal aunt.   Family history reviewed with patient. There is no family history that is pertinent to the chief complaint.     Social History   reports that he has been smoking cigarettes and cigars. He has been smoking an average of .25 packs per day. He has never used smokeless tobacco. He reports that he does not drink alcohol and does not use drugs.    Allergies  Allergies   Allergen Reactions    Benadryl [Altaryl]      \"I get agitated\"    Nsaids      Bleeding, \"I had a bleeding ulcer\"    Phenergan [Promethazine Hcl]      \"I get very agitated\"    Penicillins      \"Had " "some dizziness\"  Tolerated Unasyn 10/2019       Medications  Prior to Admission Medications   Prescriptions Last Dose Informant Patient Reported? Taking?   Adalimumab (HUMIRA PEN) 40 MG/0.4ML Pen-injector Kit > 1 WEEK at Q14D Family Member, Significant Other No No   Sig: INJECT 40MG SUBCUTANEOUSLY  EVERY 2 WEEKS   Naloxone (NARCAN) 4 MG/0.1ML Liquid PRN at PRN Family Member, Significant Other No No   Sig: One spray in one nostril for overdose and call 911.   Patient taking differently: Administer 1 Spray into affected nostril(S) as needed. One spray in one nostril for overdose and call 911.   betamethasone dipropionate 0.05 % Cream > 3 weeks at PRN Family Member, Significant Other No No   Sig: Apply 1 g topically to affected areas twice daily for 14 days.   miconazole (MICOTIN) 2 % Cream > 3 weeks at PRN Family Member, Significant Other No No   Sig: APPLY 1 GRAM TOPICALLY TWO TIMES A DAY   morphine (MS IR) 15 MG tablet 2/27/2023 at PM Family Member, Significant Other Yes No   Sig: Take 15 mg by mouth every 6 hours as needed. Indications: Pain   morphine ER (MS CONTIN) 15 MG Tab CR tablet 2/27/2023 at PM Family Member, Significant Other Yes No   Sig: Take 15 mg by mouth every 12 hours.   omeprazole (PRILOSEC) 40 MG delayed-release capsule 2/27/2023 at PM Family Member, Significant Other No No   Sig: Take 1 capsule by mouth every day.      Facility-Administered Medications: None       Physical Exam  Temp:  [37.8 °C (100 °F)-38.3 °C (101 °F)] 37.8 °C (100 °F)  Pulse:  [] 99  Resp:  [12-21] 16  BP: ()/(50-67) 93/59  SpO2:  [91 %-99 %] 96 %  Blood Pressure: 93/59   Temperature: 37.8 °C (100 °F)   Pulse: 99   Respiration: 16   Pulse Oximetry: 96 %       Physical Exam  Vitals and nursing note reviewed.   Constitutional:       Appearance: He is well-developed. He is obese. He is not diaphoretic.   HENT:      Head: Normocephalic and atraumatic.      Mouth/Throat:      Pharynx: No oropharyngeal exudate.   Eyes:    "   General: No scleral icterus.        Right eye: No discharge.         Left eye: No discharge.      Conjunctiva/sclera: Conjunctivae normal.      Pupils: Pupils are equal, round, and reactive to light.   Neck:      Vascular: No JVD.      Trachea: No tracheal deviation.   Cardiovascular:      Rate and Rhythm: Regular rhythm. Tachycardia present.      Heart sounds: No murmur heard.    No friction rub. No gallop.   Pulmonary:      Effort: Tachypnea present. No respiratory distress.      Breath sounds: No stridor. Decreased breath sounds present. No wheezing.   Chest:      Chest wall: No tenderness.   Abdominal:      General: Bowel sounds are normal. There is no distension.      Palpations: Abdomen is soft.      Tenderness: There is no abdominal tenderness. There is no rebound.   Musculoskeletal:         General: No tenderness.      Cervical back: Neck supple.   Skin:     General: Skin is warm.      Findings: Erythema (Left lower extremity to distal thigh right lower extremity to mid shin) present.      Nails: There is no clubbing.      Comments: Bilateral foot ulcers with no drainage noted or surrounding erythema   Neurological:      Mental Status: He is alert.      Cranial Nerves: No cranial nerve deficit.      Motor: Weakness (Generalized) present. No abnormal muscle tone.      Comments: Oriented to self   Psychiatric:         Behavior: Behavior normal.       Laboratory:  Recent Labs     02/28/23  1057   WBC 9.5   RBC 4.52*   HEMOGLOBIN 13.1*   HEMATOCRIT 39.6*   MCV 87.6   MCH 29.0   MCHC 33.1*   RDW 49.6   PLATELETCT 146*   MPV 9.1     Recent Labs     02/28/23  1057   SODIUM 129*   POTASSIUM 3.7   CHLORIDE 97   CO2 23   GLUCOSE 123*   BUN 14   CREATININE 1.13   CALCIUM 8.0*     Recent Labs     02/28/23  1057   ALTSGPT 54*   ASTSGOT 125*   ALKPHOSPHAT 194*   TBILIRUBIN 1.0   GLUCOSE 123*         No results for input(s): NTPROBNP in the last 72 hours.      No results for input(s): TROPONINT in the last 72  hours.    Imaging:  US-EXTREMITY VENOUS LOWER UNILAT LEFT   Final Result      DX-TIBIA AND FIBULA LEFT   Final Result      1.  Diffuse soft tissue swelling, edema and/or cellulitis.   2.  No acute osseous abnormality.   3.  Osteopenia.      DX-FOOT-2- LEFT   Final Result      Severe osteopenia and flat foot deformity without evidence of acute displaced fracture      No definite radiographic evidence of osteomyelitis.      CT-HEAD W/O   Final Result      Head CT without contrast within normal limits. No evidence of acute cerebral infarction, hemorrhage or mass lesion.         DX-CHEST-PORTABLE (1 VIEW)   Final Result      No acute cardiac or pulmonary abnormalities are identified.      CT-EXTREMITY, LOWER WITH LEFT    (Results Pending)            Assessment/Plan:  Justification for Admission Status  I anticipate this patient will require at least two midnights for appropriate medical management, necessitating inpatient admission because cellulitis with sepsis    Patient will need a Intermediate Care (Adult and Pediatrics) bed on MEDICAL service .  The need is secondary to hypoglycemia sepsis with marginal blood pressure.    * Sepsis (HCC)- (present on admission)  Assessment & Plan  This is Sepsis Present on admission  SIRS criteria identified on my evaluation include: Fever, with temperature greater than 101 deg F, Tachycardia, with heart rate greater than 90 BPM and Leukocytosis, with WBC greater than 12,000  Source is soft tissue and possible osteomyelitis  Sepsis protocol initiated  Fluid resuscitation ordered per protocol  Crystalloid Fluid Administration: Fluid resuscitation ordered per standard protocol - 30 mL/kg per current or ideal body weight  IV antibiotics as appropriate for source of sepsis  Reassessment: I have reassessed the patient's hemodynamic status    Patient will be empirically started on IV Zosyn and zyvox  Patient has a listed penicillin allergy but pharmacy notes that he tolerated Unasyn in  the past and he tolerated his dose of Zosyn in the emergency department  Follow-up on cultures and de-escalate antibiotics accordingly  Patient evaluated by orthopedics with plan for CT and further rec recommendations for possible BKA per orthopedics    Duplex negative for DVT x-rays negative for acute bony pathology              Lactic acidosis  Assessment & Plan  IV hydration and monitor    Hyponatremia  Assessment & Plan  Likely secondary to dehydration IV fluids and monitor sodium levels      Thrombocytopenia (HCC)  Assessment & Plan  Mild monitor CBC    Encephalopathy  Assessment & Plan  Acute encephalopathy secondary to sepsis and hypoglycemia    No clinical signs of meningitis  Close clinical monitoring and frequent orientation  CT head reviewed negative for acute pathology    Hypoglycemia  Assessment & Plan  Etiology not clear possibly secondary to poor intake  Hypoglycemia protocol  D5 half NS infusion  Monitor CBGs    Ulcer of both feet (HCC)- (present on admission)  Assessment & Plan  Wound care  F/u on CT    Chronic use of opiate drug for therapeutic purpose- (present on admission)  Assessment & Plan  With narcotic dependence  We will hold MS Contin at this time given hypotension   Judicious use of narcotics with close monitoring    History of immunosuppressive therapy- (present on admission)  Assessment & Plan  On Humira and intermittent steroids    Rheumatoid arthritis (HCC)- (present on admission)  Assessment & Plan  Has been maintained on Humira and intermittently on prednisone although no recent steroid use  Given hypoglycemia and sepsis we will check cortisol level and empirically start him on stress dose hydrocortisone    History of bleeding ulcers  Assessment & Plan  Continue prilosec        VTE prophylaxis: SCDs/TEDs   Alert and oriented to person, place, time/situation. normal mood and affect. no apparent risk to self or others.

## 2025-01-15 NOTE — ANESTHESIA TIME REPORT
Anesthesia Start and Stop Event Times       Date Time Event    12/16/2023 1448 Ready for Procedure     1455 Anesthesia Start     1609 Anesthesia Stop          Responsible Staff  12/16/23      Name Role Begin End    Steve Gomes M.D. Anesth 1455 1609          Overtime Reason:  no overtime (within assigned shift)    Comments:                                                           16

## 2025-05-22 NOTE — DISCHARGE PLANNING
Per Mary at [facility], they will do onsite evaluation tomorrow at 1430.  CM notified.   ,DirectAddress_Unknown

## 2025-07-02 NOTE — PROGRESS NOTES
Patient care assumed. Report received from [facility]. Patient is alert and calm, resting in bed. Call light and bedside table within reach. Will continue to monitor.    Opt out

## (undated) DEVICE — CANISTER SUCTION 3000ML MECHANICAL FILTER AUTO SHUTOFF MEDI-VAC NONSTERILE LF DISP  (40EA/CA)

## (undated) DEVICE — Device

## (undated) DEVICE — BIT DRILL DIA3.5MM CANNULATED L ADD ON FITTING DISPOSABLE FOR 7MM HEADLESS COMPRESSION SCREW

## (undated) DEVICE — PAD LAP STERILE 18 X 18 - (5/PK 40PK/CA)

## (undated) DEVICE — WRAP CO-FLEX 4IN X 5YD STERIL - SELF-ADHERENT (18/CA)

## (undated) DEVICE — SUTURE 3-0 VICRYL PLUS SH - 27 INCH (36/BX)

## (undated) DEVICE — SUTURE 3-0 ETHILON FS-1 - (36/BX) 30 INCH

## (undated) DEVICE — KIT ROOM DECONTAMINATION

## (undated) DEVICE — CUFF TOURNIQUET 44 X 4 ONE PORT DISP - STERILE (10/CA)

## (undated) DEVICE — LACTATED RINGERS INJ 1000 ML - (14EA/CA 60CA/PF)

## (undated) DEVICE — SWAB ANAEROBIC SPEC.COLLECTOR - (25/PK 4PK/CA 100EA/CA)

## (undated) DEVICE — KIT HIP PREP IM ENCHANCE TOTAL (5EA/BX)

## (undated) DEVICE — ELECTRODE 850 FOAM ADHESIVE - HYDROGEL RADIOTRNSPRNT (50/PK)

## (undated) DEVICE — GLOVE BIOGEL PI ORTHO SZ 7.5 PF LF (40PR/BX)

## (undated) DEVICE — SPONGE GAUZESTER 4 X 4 4PLY - (128PK/CA)

## (undated) DEVICE — GLOVE BIOGEL PI INDICATOR SZ 6.5 SURGICAL PF LF - (50/BX 4BX/CA)

## (undated) DEVICE — GLOVE BIOGEL SZ 6.5 SURGICAL PF LTX (50PR/BX 4BX/CA)

## (undated) DEVICE — CORDS BIPOLAR COAGULATION - 12FT STERILE DISP. (10EA/BX)

## (undated) DEVICE — GLOVE BIOGEL PI INDICATOR SZ 8.0 SURGICAL PF LF -(50/BX 4BX/CA)

## (undated) DEVICE — SUTURE 0 PDS CT-2 (36PK/BX)

## (undated) DEVICE — HEAD HOLDER JUNIOR/ADULT

## (undated) DEVICE — DRAIN JACKSON PRATT 19FR - (10/CS)

## (undated) DEVICE — TIP INTPLS HFLO ML ORFC BTRY - (12/CS)  FOR SURGILAV

## (undated) DEVICE — PADDING CAST 4 IN STERILE - 4 X 4 YDS (24/CA)

## (undated) DEVICE — FULL RING DIA180 MM - ALUMINIUM

## (undated) DEVICE — DRESSING ABDOMINAL PAD STERILE 8 X 10" (360EA/CA)"

## (undated) DEVICE — SODIUM CHL IRRIGATION 0.9% 1000ML (12EA/CA)

## (undated) DEVICE — FOOT RING LONG 155MM

## (undated) DEVICE — TOWEL STOP TIMEOUT SAFETY FLAG (40EA/CA)

## (undated) DEVICE — SUTURE 0 VICRYL PLUS CT-1 - 8 X 18 INCH (12/BX)

## (undated) DEVICE — SUTURE 2-0 VICRYL PLUS CT-1 - 8 X 18 INCH(12/BX)

## (undated) DEVICE — GOWN WARMING STANDARD FLEX - (30/CA)

## (undated) DEVICE — GLOVE BIOGEL ECLIPSE PF LATEX SIZE 8.0  (50PR/BX)

## (undated) DEVICE — SUTURE 2-0 PROLENE SH (36PK/BX)

## (undated) DEVICE — SUTURE 3-0 ETHILON FSLX 30 (36PK/BX)"

## (undated) DEVICE — GLOVE, LITE (PAIR)

## (undated) DEVICE — SUTURE RETRIEVER HEWSON LIGA - 6/BX

## (undated) DEVICE — PADDING CAST 6 IN STERILE - 6 X 4 YDS (24/CA)

## (undated) DEVICE — TUBE CONNECTING SUCTION - CLEAR PLASTIC STERILE 72 IN (50EA/CA)

## (undated) DEVICE — GLOVE BIOGEL PI INDICATOR SZ 7.5 SURGICAL PF LF -(50/BX 4BX/CA)

## (undated) DEVICE — TUBING CLEARLINK DUO-VENT - C-FLO (48EA/CA)

## (undated) DEVICE — SUCTION INSTRUMENT YANKAUER BULBOUS TIP W/O VENT (50EA/CA)

## (undated) DEVICE — ELECTRODE DUAL RETURN W/ CORD - (50/PK)

## (undated) DEVICE — GOWN SURGEONS X-LARGE - DISP. (30/CA)

## (undated) DEVICE — BLADE SAGITTAL SYSTEM 18MM

## (undated) DEVICE — STAPLER SKIN DISP - (6/BX 10BX/CA) VISISTAT

## (undated) DEVICE — STAPLER 35MM SKIN WIDE ROTATING HEAD (6EA/BX)

## (undated) DEVICE — DRAPE STRLE REG TOWEL 18X24 - (10/BX 4BX/CA)"

## (undated) DEVICE — BONE WAX (12PK/BX)

## (undated) DEVICE — SUTURE GENERAL

## (undated) DEVICE — CANISTER SUCTION RIGID RED 1500CC (40EA/CA)

## (undated) DEVICE — SUTURE 2-0 SILK 12 X 18" (36PK/BX)"

## (undated) DEVICE — BAG ISOLATION 20X20 INVISI - SHEILD (10EA/BX)

## (undated) DEVICE — DRESSING PETROLEUM GAUZE 5 X 9" (50EA/BX 4BX/CA)"

## (undated) DEVICE — GLOVE BIOGEL SZ 8 SURGICAL PF LTX - (50PR/BX 4BX/CA)

## (undated) DEVICE — DRAPE SURGICAL U 77X120 - (10/CA)

## (undated) DEVICE — CUP DENTURE W/ LID - (200/CA)

## (undated) DEVICE — DRESSING 3X8 ADAPTIC GAUZE - NON-ADHERING (36/PK 6PK/BX)

## (undated) DEVICE — PAD PREP 24 X 48 CUFFED - (100/CA)

## (undated) DEVICE — SENSOR OXIMETER ADULT SPO2 RD SET (20EA/BX)

## (undated) DEVICE — SET LEADWIRE 5 LEAD BEDSIDE DISPOSABLE ECG (1SET OF 5/EA)

## (undated) DEVICE — NEPTUNE 4 PORT MANIFOLD - (20/PK)

## (undated) DEVICE — BANDAGE ELASTIC 6 HONEYCOMB - 6X5YD LF (20/CA)"

## (undated) DEVICE — SODIUM CHL. IRRIGATION 0.9% 3000ML (4EA/CA 65CA/PF)

## (undated) DEVICE — VESSELOOP MAXI BLUE STERILE- SURG-I-LOOP (10EA/BX)

## (undated) DEVICE — DRESSING XEROFORM 1X8 - (50/BX 4BX/CA)

## (undated) DEVICE — BLADE SURGICAL #15 - (50/BX 3BX/CA)

## (undated) DEVICE — SPONGE GAUZE STER 4X4 8-PL - (2/PK 50PK/BX 12BX/CS)

## (undated) DEVICE — SUTURE 2-0 VICRYL PLUS SH - 27 INCH (36/BX)

## (undated) DEVICE — SET EXTENSION WITH 2 PORTS (48EA/CA) ***PART #2C8610 IS A SUBSTITUTE*****

## (undated) DEVICE — MASK ANESTHESIA ADULT  - (100/CA)

## (undated) DEVICE — SLEEVE, VASO, THIGH, MED

## (undated) DEVICE — DRESSING POST OP BORDER 4 X 10 (5EA/BX)

## (undated) DEVICE — PACK TOTAL KNEE  (1/CA)

## (undated) DEVICE — PACK LOWER EXTREMITY - (2/CA)

## (undated) DEVICE — GOWN SURGICAL XX-LARGE - (28EA/CA) SIRUS NON REINFORCED

## (undated) DEVICE — COVER LIGHT HANDLE ALC PLUS DISP (18EA/BX)

## (undated) DEVICE — DRAPE 36X28IN RAD CARM BND BG - (25/CA) O

## (undated) DEVICE — PROTECTOR ULNA NERVE - (36PR/CA)

## (undated) DEVICE — BANDAGE ROLL STERILE BULKEE 4.5 IN X 4 YD (100EA/CA)

## (undated) DEVICE — GLOVE BIOGEL PI ULTRATOUCH SZ 7.5 SURGICAL PF LF -(50/BX 4BX/CA)

## (undated) DEVICE — PIN TROCAR GNS 1/8INX5IN (1EA)

## (undated) DEVICE — GLOVE BIOGEL ECLIPSE PF LATEX SIZE 7.5

## (undated) DEVICE — GLOVE BIOGEL INDICATOR SZ 8 SURGICAL PF LTX - (50/BX 4BX/CA)

## (undated) DEVICE — CHLORAPREP 26 ML APPLICATOR - ORANGE TINT(25/CA)

## (undated) DEVICE — KIT ANESTHESIA W/CIRCUIT & 3/LT BAG W/FILTER (20EA/CA)

## (undated) DEVICE — DRAPE LARGE 3 QUARTER - (20/CA)

## (undated) DEVICE — SUTURE 4-0 ETHILON PS-2 18 BLACK (36PK/BX)

## (undated) DEVICE — SUTURE 3-0 MONOCRYL PLUS PS-1 - 27 INCH (36/BX)

## (undated) DEVICE — TOURNIQUET CUFF 18 X 3 ONE PORT DISP - STERILE (10/BX)

## (undated) DEVICE — RESERVOIR SUCTION 100 CC - SILICONE (20EA/CA)

## (undated) DEVICE — SUTURE 3-0 ETHILON PS-1 (36PK/BX)

## (undated) DEVICE — GLOVE BIOGEL INDICATOR SZ 6.5 SURGICAL PF LTX - (50PR/BX 4BX/CA)

## (undated) DEVICE — TRAY SRGPRP PVP IOD WT PRP - (20/CA)

## (undated) DEVICE — DRAIN JACKSON PRATT 15FR - (10EA/CA)

## (undated) DEVICE — DRESSING TRANSPARENT FILM TEGADERM 4 X 4.75" (50EA/BX)"

## (undated) DEVICE — GLOVE BIOGEL SZ 7.5 SURGICAL PF LTX - (50PR/BX 4BX/CA)

## (undated) DEVICE — GLOVE BIOGEL INDICATOR SZ 7.5 SURGICAL PF LTX - (50PR/BX 4BX/CA)

## (undated) DEVICE — SUTURE ETHILON 2-0 FSLX 30 (36PK/BX)"

## (undated) DEVICE — GLOVE BIOGEL PI INDICATOR SZ 7.0 SURGICAL PF LF - (50/BX 4BX/CA)

## (undated) DEVICE — STOCKINET TUBULAR 6IN STERILE - 6 X 48YDS (25/CA)

## (undated) DEVICE — PIN TROCAR GEN 1/8X3 (4EA/BX)

## (undated) DEVICE — HINGE BOLT - MEDIUM

## (undated) DEVICE — SENSOR SPO2 NEO LNCS ADHESIVE (20/BX) SEE USER NOTES

## (undated) DEVICE — LENS/HOOD FOR SPACESUIT - (32/PK) PEEL AWAY FACE

## (undated) DEVICE — TUBE, CULTURE AEROBIC

## (undated) DEVICE — HANDPIECE 10FT INTPLS SCT PLS IRRIGATION HAND CONTROL SET (6/PK)

## (undated) DEVICE — BLADE SAGITTAL DUAL 25MM

## (undated) DEVICE — BLADE SAW 90X25X1.37MM SAGITTAL DUAL CUT

## (undated) DEVICE — BLADE SURGICAL #10 - (50/BX)

## (undated) DEVICE — IMMOBILIZER KNEE 20 INCH - (1/EA)

## (undated) DEVICE — GLOVE SZ 7.5 BIOGEL PI MICRO - PF LF (50PR/BX)

## (undated) DEVICE — SLING ORTH UNV TIETX VLFM ARM

## (undated) DEVICE — MIXER BONE CEMENT REVOLUTION - W/FEMORAL PRESSURIZER (6/CA)

## (undated) DEVICE — TOWELS CLOTH SURGICAL - (4/PK 20PK/CA)

## (undated) DEVICE — WRAP COBAN SELF-ADHERENT 6 IN X  5YDS STERILE TAN (12/CA)

## (undated) DEVICE — SUTURE 1 VICRYL PLUS CTX - 36 INCH (36/BX)

## (undated) DEVICE — KIT EVACUATER 3 SPRING PVC LF 1/8 DRAIN SIZE (10EA/CA)"

## (undated) DEVICE — GLOVE BIOGEL PI ORTHO SZ 8 PF LF (40PR/BX)

## (undated) DEVICE — SUTURE 3-0 ETHIBOND RB-1 (36PK/BX)

## (undated) DEVICE — SUTURE 2-0 ETHIBOND GREEN CT-2 TAPER (36PK/BX)

## (undated) DEVICE — PAD UNIVERSAL MULTI USE (1/EA)

## (undated) DEVICE — BLADE SAGITTAL SAW DUAL CUT 25.0 X 90.0 X 1.27MM (1/EA)

## (undated) DEVICE — SUTURE 2-0 PDS II CT-2 - (36/BX)

## (undated) DEVICE — BANDAGE ELASTIC 4 HONEYCOMB - 4"X5YD LF (20/CA)"

## (undated) DEVICE — THREADED ROD - LENGTH: 120 MM

## (undated) DEVICE — IMMOBILIZER KNEE 24 INCH

## (undated) DEVICE — TAPE FIBER #2 (6/BX)

## (undated) DEVICE — GLOVE SURGICAL PROTEXIS PI 8.0 LF - (50PR/BX)

## (undated) DEVICE — SUTURE 1 PDS-2 PLUS CTX - (24/BX)

## (undated) DEVICE — GLOVE SZ 7 BIOGEL PI MICRO - PF LF (50PR/BX 4BX/CA)

## (undated) DEVICE — FOOT ARCH 155MM

## (undated) DEVICE — TRAY SPINAL ANESTHESIA NON-SAFETY (10/CA)

## (undated) DEVICE — DRAPE C ARMOR (12EA/CA)

## (undated) DEVICE — SLEEVE VASO CALF MED - (10PR/CA)